# Patient Record
Sex: FEMALE | Race: WHITE | NOT HISPANIC OR LATINO | Employment: OTHER | URBAN - METROPOLITAN AREA
[De-identification: names, ages, dates, MRNs, and addresses within clinical notes are randomized per-mention and may not be internally consistent; named-entity substitution may affect disease eponyms.]

---

## 2017-01-10 ENCOUNTER — TELEPHONE (OUTPATIENT)
Dept: FAMILY MEDICINE | Facility: CLINIC | Age: 77
End: 2017-01-10
Payer: MEDICARE

## 2017-01-10 DIAGNOSIS — Z79.01 LONG TERM CURRENT USE OF ANTICOAGULANT THERAPY: Primary | ICD-10-CM

## 2017-01-10 LAB — INR POINT OF CARE: 1.9 (ref 0.86–1.14)

## 2017-01-10 PROCEDURE — 36416 COLLJ CAPILLARY BLOOD SPEC: CPT | Performed by: FAMILY MEDICINE

## 2017-01-10 PROCEDURE — 85610 PROTHROMBIN TIME: CPT | Mod: QW | Performed by: FAMILY MEDICINE

## 2017-01-10 PROCEDURE — 99207 ZZC NO CHARGE NURSE ONLY: CPT | Performed by: FAMILY MEDICINE

## 2017-01-10 NOTE — TELEPHONE ENCOUNTER
ANTICOAGULATION FOLLOW-UP CLINIC VISIT    Patient Name:  Betty Tee  Date:  1/10/2017  Contact Type:  Face to Face    SUBJECTIVE:  Bleeding Signs/Symptoms: None  Thromboembolic Signs/Symptoms: None    Medication Changes:  No  Dietary Changes:  No  Bacterial/Viral Infection:  No    Missed Coumadin Doses:  None  Other Concerns:  No      ASSESSMENT/PLAN:  See: ANTICOAGULATION QIC flow sheet.    INR 1.9  Plan: Continue 7.5 mg daily = 52.5 mg/wk. Recheck INR in 2 weeks.  Sophia Hemphill RN      Dosage adjustment made based on physician directed care plan.    JIMI VOGT

## 2017-01-12 ENCOUNTER — OFFICE VISIT (OUTPATIENT)
Dept: CARDIOLOGY | Facility: CLINIC | Age: 77
End: 2017-01-12
Attending: INTERNAL MEDICINE
Payer: MEDICARE

## 2017-01-12 ENCOUNTER — PRE VISIT (OUTPATIENT)
Dept: CARDIOLOGY | Facility: CLINIC | Age: 77
End: 2017-01-12

## 2017-01-12 VITALS
WEIGHT: 218 LBS | HEART RATE: 104 BPM | OXYGEN SATURATION: 97 % | BODY MASS INDEX: 34.21 KG/M2 | SYSTOLIC BLOOD PRESSURE: 116 MMHG | DIASTOLIC BLOOD PRESSURE: 64 MMHG | HEIGHT: 67 IN

## 2017-01-12 DIAGNOSIS — I50.32 CHRONIC DIASTOLIC CONGESTIVE HEART FAILURE (H): ICD-10-CM

## 2017-01-12 DIAGNOSIS — J81.0 ACUTE EDEMA OF LUNG (H): Primary | ICD-10-CM

## 2017-01-12 DIAGNOSIS — I48.91 ATRIAL FIBRILLATION WITH CONTROLLED VENTRICULAR RESPONSE (H): ICD-10-CM

## 2017-01-12 LAB
ANION GAP SERPL CALCULATED.3IONS-SCNC: 10 MMOL/L (ref 3–14)
BUN SERPL-MCNC: 12 MG/DL (ref 7–30)
CALCIUM SERPL-MCNC: 8.8 MG/DL (ref 8.5–10.1)
CHLORIDE SERPL-SCNC: 101 MMOL/L (ref 94–109)
CO2 SERPL-SCNC: 27 MMOL/L (ref 20–32)
CREAT SERPL-MCNC: 0.87 MG/DL (ref 0.52–1.04)
GFR SERPL CREATININE-BSD FRML MDRD: 63 ML/MIN/1.7M2
GLUCOSE SERPL-MCNC: 278 MG/DL (ref 70–99)
POTASSIUM SERPL-SCNC: 3.3 MMOL/L (ref 3.4–5.3)
SODIUM SERPL-SCNC: 139 MMOL/L (ref 133–144)

## 2017-01-12 PROCEDURE — 36415 COLL VENOUS BLD VENIPUNCTURE: CPT | Performed by: INTERNAL MEDICINE

## 2017-01-12 PROCEDURE — 99213 OFFICE O/P EST LOW 20 MIN: CPT | Mod: ZF

## 2017-01-12 PROCEDURE — 99214 OFFICE O/P EST MOD 30 MIN: CPT | Mod: ZP | Performed by: INTERNAL MEDICINE

## 2017-01-12 PROCEDURE — 80048 BASIC METABOLIC PNL TOTAL CA: CPT | Performed by: INTERNAL MEDICINE

## 2017-01-12 RX ORDER — FUROSEMIDE 40 MG
40 TABLET ORAL 2 TIMES DAILY
Qty: 180 TABLET | Refills: 3 | Status: SHIPPED | OUTPATIENT
Start: 2017-01-12 | End: 2017-04-28

## 2017-01-12 RX ORDER — SPIRONOLACTONE 25 MG/1
25 TABLET ORAL DAILY
Qty: 90 TABLET | Refills: 3 | Status: SHIPPED | OUTPATIENT
Start: 2017-01-12 | End: 2017-06-22

## 2017-01-12 RX ORDER — METOPROLOL SUCCINATE 100 MG/1
100 TABLET, EXTENDED RELEASE ORAL DAILY
Qty: 90 TABLET | Refills: 3 | Status: SHIPPED | OUTPATIENT
Start: 2017-01-12 | End: 2018-01-15

## 2017-01-12 ASSESSMENT — PAIN SCALES - GENERAL: PAINLEVEL: NO PAIN (0)

## 2017-01-12 NOTE — Clinical Note
1/12/2017      RE: Betty Tee  [address]  [facility] [address]       Dear Colleague,    Thank you for the opportunity to participate in the care of your patient, Betty Tee, at the [facility] acute [facility]. Please see a copy of my visit note below.    CC:  New HFpreservedEF referral     HPI:   75 yo F with Interstitial Lung disease (possible moderate restriction), Sjogren's syndrome, HFpEF, HTN, atrial fibrillation [id number] (age >75, HTN, CHF, gender), ANGELICA on CPAP, obesity, GI bleed, diverticulitis s/p colectomy 2011 who was referred to us by EP clinic to establish care for HFpEF. I have seen her one time previous to this.     In July she was hospitalized for lower GI bleed and Afib, resuscitated with fluids and developed CHF. Echo showed normal EF.    She started having dyspnea with activity this summer. Her symptoms improved after we increased her diuretics. Presently, she can walk 1 block or climb 1 flight before becoming SOB. She has orthopnea and no PND. Her leg swelling is improved from the last we saw her and is now minimal.  Her lightheadedness is improved from the last I saw her.     She is now on lasix dose 40mg AM and 40mg PM.     Uses 4L oxygen with exertional activities like grocery shopping, climbing stairs etc. Doesn't use it at rest or at night.       PAST MEDICAL HISTORY:  Past Medical History   Diagnosis Date     Tobacco use disorder      chantix in 9/07, started again in 6/08, working     Chest pain, unspecified      Cardiomegaly      LVH on stress echo- cardiac w/u at [facility]- neg CT scan for PE, neg stress echo in 8/06     Alcohol abuse, in remission      Osteoarthrosis, unspecified whether generalized or localized, unspecified site      Disorder of bone and cartilage, unspecified      osteopenia (had been on prempro), improved on 6/06 dexa, stable dexa 11/10     Diverticulosis of colon (without mention of hemorrhage)       last episode yrs ago     Major depressive disorder, recurrent episode, moderate (H)      Essential hypertension, benign      Insomnia, unspecified      weaned off clonazepam     Allergic rhinitis, cause unspecified      allegra helps when she takes it     Lumbago 7/09     MRI with DJD, now seeing Dr. Cain for sciatic sx's     Atrial fibrillation (H)      in hosp in 11/11 after surgery w/ fluid overload     Antiplatelet or antithrombotic long-term use      Irregular heart beat      Sjogren's syndrome (H)      Gastro-oesophageal reflux disease      Obstructive sleep apnea      Sleep apnea        FAMILY HISTORY:  Mother had MI and CHF in her 60's. Sister had MI and CHF in her 60's  Family History   Problem Relation Age of Onset     C.A.D. Mother 63     MI- first at age 63     C.A.D. Sister 52     Minor MI- age 50's     HEART DISEASE Mother      HEART DISEASE Sister      Hypertension Mother      Hypertension Sister      Hypertension Sister      Hypertension Brother      CEREBROVASCULAR DISEASE Mother      Cancer - colorectal Sister 48     Late 40's early 50's     Prostate Cancer Brother 74     Dx'd age 74     Alcohol/Drug Father      Alzheimer Disease Father      GASTROINTESTINAL DISEASE Sister      Diverticulitis     GASTROINTESTINAL DISEASE Brother      Diverticulitis     Lipids Sister      Lipids Sister      Parkinsonism Brother      DIABETES Sister      HEART DISEASE Sister      CHF     CANCER Sister      lung, smoker     Substance Abuse Sister      Substance Abuse Brother      Dementia Father      Asthma Sister      CANCER Sister      Substance Abuse Sister      Substance Abuse Brother      Asthma Sister      Hypertension Father      Breast Cancer Daughter      Prostate Cancer Brother      Hyperlipidemia Mother      Hyperlipidemia Father      Hyperlipidemia Brother        SOCIAL HISTORY:  1/2 pack/yr for 25 yrs, quit 20 yrs ago, no etoh/drug use. Retired teacher    Social History     Social History     Marital  Status: Single     Spouse Name: N/A     Number of Children: 0     Years of Education: Ed Spec De            Social History Main Topics     Smoking status: Former Smoker -- 0.50 packs/day for 10 years     Types: Cigarettes     Quit date: 08/01/2011     Smokeless tobacco: Never Used      Comment: 1/2 ppd     Alcohol Use: No      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     Drug Use: No     Sexual Activity: No     Other Topics Concern     None     Social History Narrative    Social Documentation:        Balanced Diet: YES    Calcium intake: 1-2 per day    Caffeine: 4-5 cups per day    Exercise:  type of activity limited right now due to foot injury    Sunscreen: No    Seatbelts:  Yes    Self Breast Exam:  Yes    Self Testicular Exam: n/a    Physical/Emotional/Sexual Abuse: No    Do you feel safe in your environment? No-pt lives in [facility]        Cholesterol screen up to date: No-will check today    Eye Exam up to date: Yes    Dental Exam up to date: Yes    Pap smear up to date: Does Not Apply    Mammogram up to date: Yes-10/07    Dexa Scan up to date: Yes-2006    Colonoscopy up to date: Yes-2006    Immunizations up to date: Yes-td 2002    Glucose screen if over 40:  Yes    '09       CURRENT MEDICATIONS:    Prescription Medications as of 1/14/2017             furosemide (LASIX) 40 MG tablet Take 1 tablet (40 mg) by mouth 2 times daily    metoprolol (TOPROL-XL) 100 MG 24 hr tablet Take 1 tablet (100 mg) by mouth daily    spironolactone (ALDACTONE) 25 MG tablet Take 1 tablet (25 mg) by mouth daily    pantoprazole (PROTONIX) 40 MG EC tablet Take 1 tablet (40 mg) by mouth daily    ketoconazole (NIZORAL) 2 % cream Apply topically 2 times daily    montelukast (SINGULAIR) 10 MG tablet Take 1 tablet (10 mg) by mouth At Bedtime    traZODone (DESYREL) 50 MG tablet TAKE 1/2-1 TABLET BY MOUTH NIGHTLY AS NEEDED, CAN TITRATE DOWN TO 1/2TAB OR UP TO 2TABS AS NEEDED    albuterol (PROAIR HFA, PROVENTIL HFA, VENTOLIN HFA) 108 (90 BASE)  MCG/ACT inhaler Inhale 2 puffs into the lungs every 6 hours    calcium-vitamin D (CALTRATE) 600-400 MG-UNIT per tablet Take 1 tablet by mouth 2 times daily    PARoxetine (PAXIL) 20 MG tablet TAKE 1 TABLET (20 MG) BY MOUTH AT BEDTIME    order for DME Oxygen: Patient requires supplemental Oxygen 4 LPM via nasal canula with activity. Please provide patient with a home unit and with portability capability. Oxygen will be for a lifetime.    azithromycin (ZITHROMAX) 250 MG tablet Take 1 tablet (250 mg) by mouth daily    diclofenac (VOLTAREN) 1 % GEL Apply 4 grams to knees or 2 grams to hands four times daily using enclosed dosing card.    polyethylene glycol (MIRALAX/GLYCOLAX) packet Take 17 g by mouth daily as needed for constipation    warfarin (COUMADIN) 7.5 MG tablet Current dose is 7.5 mg daily.  Dose adjusted per INR result.    acyclovir (ZOVIRAX) 5 % ointment Apply topically 6 times daily    fluticasone (FLOVENT HFA) 220 MCG/ACT inhaler Inhale 2 puffs into the lungs 2 times daily    predniSONE (DELTASONE) 10 MG tablet Take 1 tablet (10 mg) by mouth daily    acyclovir (ZOVIRAX) 400 MG tablet Take 400 mg by mouth See Admin Instructions 5 times daily as needed for outbreaks    fluticasone (FLONASE) 50 MCG/ACT nasal spray Spray 1-2 sprays into both nostrils daily    COMPRESSION STOCKINGS Wear compression stockings in affected leg (right leg) or both legs most of the time during the day and take them off at night.    acetaminophen (TYLENOL) 500 MG tablet Take 500 mg by mouth nightly as needed         ROS:   Constitutional: No fever, chills, or sweats. No weight gain/loss.   ENT: No visual disturbance, ear ache, epistaxis, sore throat.   Allergies/Immunologic: Negative.   Respiratory: No cough, hemoptysis.   Cardiovascular: As per HPI.   GI: No nausea, vomiting, hematemesis, melena, or hematochezia.   : No urinary frequency, dysuria, or hematuria.   Integument: Negative.   Psychiatric: Negative.   Neuro: Negative.  "  Endocrinology: Negative.   Musculoskeletal: Negative.    EXAM:  /64 mmHg  Pulse 104  Ht 1.695 m (5' 6.75\")  Wt 98.884 kg (218 lb)  BMI 34.42 kg/m2  SpO2 97%  General: appears comfortable, alert and articulate  Head: normocephalic, atraumatic  Eyes: anicteric sclera, EOMI  Neck: no adenopathy  Orophyarynx: moist mucosa, no lesions, dentition intact  Heart: Irregular, S1/S2, no murmur, gallop, rub, estimated JVP 10 cm  Lungs: clear, no rales or wheezing  Abdomen: soft, non-tender, bowel sounds present, no hepatosplenomegaly  Extremities: 1-2mm pitting edema from feet to proximal legs bilaterally, no clubbing, cyanosis.  Neurological: normal speech and affect, no gross motor deficits    Labs:  CBC RESULTS:  Lab Results   Component Value Date    WBC 10.1 09/20/2016    RBC 4.36 09/20/2016    HGB 10.4* 09/20/2016    HCT 35.5 09/20/2016    MCV 81 09/20/2016    MCH 23.9* 09/20/2016    MCHC 29.3* 09/20/2016    RDW 18.9* 09/20/2016     09/20/2016       CMP RESULTS:  Lab Results   Component Value Date     12/14/2016    POTASSIUM 3.7 12/14/2016    CHLORIDE 101 12/14/2016    CO2 31 12/14/2016    ANIONGAP 7 12/14/2016    * 12/14/2016    BUN 14 12/14/2016    CR 0.91 12/14/2016    GFRESTIMATED 60* 12/14/2016    GFRESTBLACK 72 12/14/2016    CLAUDY 8.9 12/14/2016    BILITOTAL 0.7 11/03/2016    ALBUMIN 3.4 11/03/2016    ALKPHOS 70 11/03/2016    ALT 21 11/03/2016    AST 12 11/03/2016        INR RESULTS:  Lab Results   Component Value Date    INR 1.9* 01/10/2017    INR 2.35* 08/01/2016    INR 3.0* 04/28/2015       Lab Results   Component Value Date    MAG 2.1 08/01/2016     Lab Results   Component Value Date    NTBNPI 2216* 07/02/2016     Lab Results   Component Value Date    NTBNP 755* 11/03/2016     HgA1c 7% Sept 2016    ECG 10/26/16 atrial fibrillation HR 72    48hr holter July 2016- generally controlled atrial fibrillation    July 2016 Complete Portable Echo Adult. Contrast Optison. Optison (NDC " #[id number])  given intravenously. Patient was given 4 ml mixture of 3 ml Optison and 6 ml  saline. 5 ml wasted. Interpretation Summary  Atrial fibrillation  Left ventricular function, chamber size, wall motion, and wall thickness are  normal.The EF is 60-65%. Normal LV size and wall thickness, mild bilateral atrial enlargement  PA pressure cannot be determined  Estimated RA pressure 8 mmHg    Regadenosen MPI 2014: no fixed or inducible defects      Assessment and Plan:  In summary this is a very pleasant 76 year old female with suspected HFpEF who is referred today for further evaluation and treatment.     In support of the diagnosis of HFpEF includes mild LA enlargement, echocardiographic signs of increase LV filling pressures, increased nt-bnp, as well as a diuretic requirement and symptomatic improvement on diuretics. She also developed flash pulmonary edema after receiving fluid in the hospital in the setting of a GI bleed.     The risk factors for HFpEF in her include age, gender, HTN, pre-diabetes, sleep apnea and obesity.  A prior stress test was negative for CAD. She is presently euvolemic on exam and is NYHA class III.    The mainstays of therapy for HFpEF include volume management, blood pressure control, treating underlying sleep apnea if present, weight management, exercise training, rate control for atrial fibrillation as well as consideration for a rhythm control strategy, as well as consideration for clinical trials.      I do have her on spironolactone given the results of the TOPCAT trial. Patients have symptomatically felt very improved on this medication.     This patient is not a candidate for UYS641 (PARAGON) as this clinical trial is now closed to enrollment.         Summary of today's plan:   -  Continue lasix at 40 mg Q AM/40 Q PM -  - increasing metoprolol as a fib is fast on exam today   -  ischemia evaluation is  complete   - increasing spironolactone to 25 mg daily given low K and we  will recheck next week       Other:    Af fib:- permanent  poorly controlled rate on exam increasing her metoprolol to 100 mg XL daily - INR goal 2-3  [id number] score of 5 warrants anticoagulation       We look forward to seeing Betty Tee in back in 5 months for follow up up.      Please feel free to contact me with any further questions and thank you so much for this interesing referral.     Sincerely,     Radha Rosa  [facility]      Director,  [facility] HFpEF Program       CC  Patient Care Team:  Ilene Tristan MD as PCP - General  [facility] [place], Becky Laureano MD as Resident  Landon, Jose A Saul MD as MD (Rheumatology)  William Azevedo MD as MD (Internal Medicine)  Kirill Polk MD as MD (Otolaryngology)  Aubrey Hurtado MD as MD (Cardiology)  Jasvir Franco MD as Resident (Internal Medicine)  Danay Payne RN as Nurse Coordinator (Clinical Cardiac Electrophysiology)  Juana Nunn RN as Registered Nurse (Cardiology)  Anderson Perez MD as MD (Clinical Cardiac Electrophysiology)  Martha Gardiner, RN as Nurse Coordinator (Cardiology)  NORRIS MEEHAN

## 2017-01-12 NOTE — MR AVS SNAPSHOT
After Visit Summary   1/12/2017    Betty Tee    MRN: [id number]           Patient Information     Date Of Birth          1940        Visit Information        Provider Department      1/12/2017 1:30 PM Radha Rosa MD [facility]        Today's Diagnoses     Acute edema of lung (H)    -  1     Atrial fibrillation with controlled ventricular response (H)         Chronic diastolic congestive heart failure (H)           Care Instructions    You were seen today in the Cardiovascular Clinic at the [facility].     Cardiology Providers you saw during your visit:  Dr. Rosa    Recommendations:  Increase Spironolactone to 25 mg (1 pill) once daily  Increase Toprol to 100 mg once daily, we've sent a new prescription for 100 mg tablets  We've sent a new prescription for your lasix of 40 mg tablets-this will be the same dose you currently take but 1 pill twice daily  Have labs repeated next week  Eat a heart healthy, low sodium diet.  Get 20 to 30 minutes of aerobic exercise 4 to 5 times per week as tolerated. (Examples of aerobic exercise include: walking, bicycling, swimming, running).    Follow-up:  With Dr. Rosa in 5 months with labs    Results:      Orders Only on 01/12/2017   Component Date Value Ref Range Status     Sodium 01/12/2017 139  133 - 144 mmol/L Final     Potassium 01/12/2017 3.3* 3.4 - 5.3 mmol/L Final     Chloride 01/12/2017 101  94 - 109 mmol/L Final     Carbon Dioxide 01/12/2017 27  20 - 32 mmol/L Final     Anion Gap 01/12/2017 10  3 - 14 mmol/L Final     Glucose 01/12/2017 278* 70 - 99 mg/dL Final     Urea Nitrogen 01/12/2017 12  7 - 30 mg/dL Final     Creatinine 01/12/2017 0.87  0.52 - 1.04 mg/dL Final     GFR Estimate 01/12/2017 63  >60 mL/min/1.7m2 Final    Non  GFR Calc     GFR Estimate If Black 01/12/2017 76  >60 mL/min/1.7m2 Final    African American GFR Calc     Calcium 01/12/2017 8.8  8.5 - 10.1 mg/dL Final       For  emergencies call 911.    For any scheduling needs, please call [phone], option #1 then option # 1.    Thank you for entrusting us with your care!     Please call if you have any questions or concerns.    Ashlyn Lucas RN  Cardiology Care Coordinator  [phone], press option # 1 to be routed to the [place] then option # 3 for medical questions to speak with a nurse    If you have an urgent need after business hours or over the weekend please call [phone] and ask for the cardiology fellow on call.     If you have a hard time paying for your medications visit http://www.needymeds.org/ to see where you might be able to get your medications the cheapest along with patient assistance programs available.    Heart failure patients and family members are welcome you to come to one of our heart failure support group meetings on the following dates from 1-2 pm :12/5/16. These all take place on the 8th floor of the [facility] hospital building in the [facility] Conference Room. Let us know if you have any questions.          Follow-ups after your visit        Your next 10 appointments already scheduled     Jan 24, 2017  1:30 PM   LAB with UP LAB   [facility] ([facility])    [address] [id number] [phone]           Patient must bring picture ID.  Patient should be prepared to give a urine specimen  Please do not eat 10-12 hours before your appointment if you are coming in fasting for labs on lipids, cholesterol, or glucose (sugar).  Pregnant women should follow their Care Team instructions. Water with medications is okay. Do not drink coffee or other fluids.   If you have concerns about taking  your medications, please ask at office or if scheduling via [employer], send a message by clicking on Secure Messaging, Message Your Care Team.            Feb 15, 2017 10:00 AM   Office Visit with Ilene Tristan MD   [facility])    [address]  [facility] [address] [phone]           Bring a current list of meds and any records pertaining to this visit.  For Physicals, please bring immunization records and any forms needing to be filled out.  Please arrive 10 minutes early to complete paperwork.            Mar 22, 2017 11:40 AM   (Arrive by 11:25 AM)   CT CHEST W/O CONTRAST with UCCT2   [facility] ([facility])    [address] [place] [id number] [phone]           Please bring any scans or X-rays taken at other hospitals, if similar tests were done. Also bring a list of your medicines, including vitamins, minerals and over-the-counter drugs. It is safest to leave personal items at home.  Be sure to tell your doctor:   If you have any allergies.   If there s any chance you are pregnant.   If you are breastfeeding.   If you have any special needs.  You do not need to do anything special to prepare.  Please wear loose clothing, such as a sweat suit or jogging clothes. Avoid snaps, zippers and other metal. We may ask you to undress and put on a hospital gown.            Mar 22, 2017 12:00 PM   FULL PULMONARY FUNCTION with  PFL A   [facility] ([facility])    [address] [place] [id number] [phone]            Mar 22, 2017  1:00 PM   (Arrive by 12:45 PM)   Return Interstitial Lung with Meño Walsh MD   [facility] ([facility])    [address] [place] [id number]   [phone]            Jun 22, 2017  4:30 PM   Lab with  LAB   [facility] ([facility])    [address] [place] [address] [id number] [phone]            Jun 22, 2017  5:00 PM   (Arrive by 4:45 PM)   RETURN HEART FAILURE with Radha Rosa MD     "[facility] ([facility])    [address]  3rd Floor  [facility] [id number] [phone]              Future tests that were ordered for you today     Open Future Orders        [facility] Expected Expires Ordered    Basic metabolic panel STAT 1/14/2017 1/19/2017 1/12/2017            Who to contact     If you have questions or need follow up information about today's clinic visit or your schedule please contact [facility] directly at [phone].  Normal or non-critical lab and imaging results will be communicated to you by [employer], letter or phone within 4 business days after the clinic has received the results. If you do not hear from us within 7 days, please contact the clinic through [employer] or phone. If you have a critical or abnormal lab result, we will notify you by phone as soon as possible.  Submit refill requests through [employer] or call your pharmacy and they will forward the refill request to us. Please allow 3 business days for your refill to be completed.          Additional Information About Your Visit        [employer] Information     [employer] gives you secure access to your electronic health record. If you see a primary care provider, you can also send messages to your care team and make appointments. If you have questions, please call your primary care clinic.  If you do not have a primary care provider, please call [phone] and they will assist you.        Care EveryWhere ID     This is your Care EveryWhere ID. This could be used by other organizations to access your [facility] medical records  [id number]        Your Vitals Were     Pulse Height BMI (Body Mass Index) Pulse Oximetry          104 1.695 m (5' 6.75\") 34.42 kg/m2 97%         Blood Pressure from Last 3 Encounters:   01/12/17 116/64   12/30/16 133/80   12/23/16 115/63    Weight from Last 3 Encounters:   01/12/17 98.884 kg (218 lb)   12/30/16 98.884 kg (218 lb)   12/23/16 100.699 kg (222 " lb)                 Today's Medication Changes          These changes are accurate as of: 1/12/17  2:30 PM.  If you have any questions, ask your nurse or doctor.               These medicines have changed or have updated prescriptions.        Dose/Directions    acyclovir 5 % ointment   Commonly known as:  ZOVIRAX   This may have changed:  additional instructions   Used for:  Recurrent cold sores        Apply topically 6 times daily   Quantity:  15 g   Refills:  3       fluticasone 50 MCG/ACT spray   Commonly known as:  FLONASE   This may have changed:    - when to take this  - reasons to take this   Used for:  Seasonal allergic rhinitis        Dose:  1-2 spray   Spray 1-2 sprays into both nostrils daily   Quantity:  16 g   Refills:  5       furosemide 40 MG tablet   Commonly known as:  LASIX   This may have changed:    - medication strength  - how much to take  - how to take this  - when to take this  - additional instructions   Used for:  Acute edema of lung (H)   Changed by:  Radha Rosa MD        Dose:  40 mg   Take 1 tablet (40 mg) by mouth 2 times daily   Quantity:  180 tablet   Refills:  3       metoprolol 100 MG 24 hr tablet   Commonly known as:  TOPROL-XL   This may have changed:    - medication strength  - how much to take  - how to take this  - when to take this  - additional instructions   Used for:  Atrial fibrillation with controlled ventricular response (H)   Changed by:  Radha Rosa MD        Dose:  100 mg   Take 1 tablet (100 mg) by mouth daily   Quantity:  90 tablet   Refills:  3       spironolactone 25 MG tablet   Commonly known as:  ALDACTONE   This may have changed:  how much to take   Used for:  Chronic diastolic congestive heart failure (H)   Changed by:  Radha Rosa MD        Dose:  25 mg   Take 1 tablet (25 mg) by mouth daily   Quantity:  90 tablet   Refills:  3            Where to get your medicines      These medications were sent to [facility]/pharmacy [facility][id number] -  [place] - [address] [place] [address]     Phone:  [phone]    - furosemide 40 MG tablet  - metoprolol 100 MG 24 hr tablet  - spironolactone 25 MG tablet             Primary Care Provider Office Phone # Fax #    Ilene Tristan [phone]       [facility] [address]  [facility] [address]        Thank you!     Thank you for choosing [facility]  for your care. Our goal is always to provide you with excellent care. Hearing back from our patients is one way we can continue to improve our services. Please take a few minutes to complete the written survey that you may receive in the mail after your visit with us. Thank you!             Your Updated Medication List - Protect others around you: Learn how to safely use, store and throw away your medicines at www.disposemymeds.org.          This list is accurate as of: 1/12/17  2:30 PM.  Always use your most recent med list.                   Brand Name Dispense Instructions for use    acyclovir 400 MG tablet    ZOVIRAX     Take 400 mg by mouth See Admin Instructions 5 times daily as needed for outbreaks       acyclovir 5 % ointment    ZOVIRAX    15 g    Apply topically 6 times daily       albuterol 108 (90 BASE) MCG/ACT Inhaler    PROAIR HFA/PROVENTIL HFA/VENTOLIN HFA    1 Inhaler    Inhale 2 puffs into the lungs every 6 hours       azithromycin 250 MG tablet    ZITHROMAX    30 tablet    Take 1 tablet (250 mg) by mouth daily       calcium-vitamin D 600-400 MG-UNIT per tablet    CALTRATE    60 tablet    Take 1 tablet by mouth 2 times daily       COMPRESSION STOCKINGS     2 each    Wear compression stockings in affected leg (right leg) or both legs most of the time during the day and take them off at night.       diclofenac 1 % Gel topical gel    VOLTAREN    100 g    Apply 4 grams to knees or 2 grams to hands four times daily using enclosed dosing card.        fluticasone 220 MCG/ACT Inhaler    FLOVENT HFA    3 Inhaler    Inhale 2 puffs into the lungs 2 times daily       fluticasone 50 MCG/ACT spray    FLONASE    16 g    Spray 1-2 sprays into both nostrils daily       furosemide 40 MG tablet    LASIX    180 tablet    Take 1 tablet (40 mg) by mouth 2 times daily       ketoconazole 2 % cream    NIZORAL    60 g    Apply topically 2 times daily       metoprolol 100 MG 24 hr tablet    TOPROL-XL    90 tablet    Take 1 tablet (100 mg) by mouth daily       montelukast 10 MG tablet    SINGULAIR    90 tablet    Take 1 tablet (10 mg) by mouth At Bedtime       order for DME     1 Device    Oxygen: Patient requires supplemental Oxygen 4 LPM via nasal canula with activity. Please provide patient with a home unit and with portability capability. Oxygen will be for a lifetime.       pantoprazole 40 MG EC tablet    PROTONIX    90 tablet    Take 1 tablet (40 mg) by mouth daily       PARoxetine 20 MG tablet    PAXIL    90 tablet    TAKE 1 TABLET (20 MG) BY MOUTH AT BEDTIME       polyethylene glycol Packet    MIRALAX/GLYCOLAX    7 packet    Take 17 g by mouth daily as needed for constipation       predniSONE 10 MG tablet    DELTASONE    90 tablet    Take 1 tablet (10 mg) by mouth daily       spironolactone 25 MG tablet    ALDACTONE    90 tablet    Take 1 tablet (25 mg) by mouth daily       traZODone 50 MG tablet    DESYREL    60 tablet    TAKE 1/2-1 TABLET BY MOUTH NIGHTLY AS NEEDED, CAN TITRATE DOWN TO 1/2TAB OR UP TO 2TABS AS NEEDED       TYLENOL 500 MG tablet   Generic drug:  acetaminophen      Take 500 mg by mouth nightly as needed       warfarin 7.5 MG tablet    COUMADIN    100 tablet    Current dose is 7.5 mg daily.  Dose adjusted per INR result.

## 2017-01-12 NOTE — NURSING NOTE
Labs: Patient was given results of the laboratory testing obtained today. Patient was instructed to return for the next laboratory testing in 1 week. Patient demonstrated understanding of this information and agreed to call with further questions or concerns.   Return Appointment: Patient given instructions regarding scheduling next clinic visit. Patient demonstrated understanding of this information and agreed to call with further questions or concerns.  Medication Change: Patient was educated regarding prescribed medication change, including discussion of the indication, administration, side effects, and when to report to MD or RN. Patient demonstrated understanding of this information and agreed to call with further questions or concerns.  Patient stated she understood all health information given and agreed to call with further questions or concerns.

## 2017-01-12 NOTE — PATIENT INSTRUCTIONS
You were seen today in the Cardiovascular Clinic at the [facility].     Cardiology Providers you saw during your visit:  Dr. Rosa    Recommendations:  Increase Spironolactone to 25 mg (1 pill) once daily  Increase Toprol to 100 mg once daily, we've sent a new prescription for 100 mg tablets  We've sent a new prescription for your lasix of 40 mg tablets-this will be the same dose you currently take but 1 pill twice daily  Have labs repeated next week  Eat a heart healthy, low sodium diet.  Get 20 to 30 minutes of aerobic exercise 4 to 5 times per week as tolerated. (Examples of aerobic exercise include: walking, bicycling, swimming, running).    Follow-up:  With Dr. Rosa in 5 months with labs    Results:      Orders Only on 01/12/2017   Component Date Value Ref Range Status     Sodium 01/12/2017 139  133 - 144 mmol/L Final     Potassium 01/12/2017 3.3* 3.4 - 5.3 mmol/L Final     Chloride 01/12/2017 101  94 - 109 mmol/L Final     Carbon Dioxide 01/12/2017 27  20 - 32 mmol/L Final     Anion Gap 01/12/2017 10  3 - 14 mmol/L Final     Glucose 01/12/2017 278* 70 - 99 mg/dL Final     Urea Nitrogen 01/12/2017 12  7 - 30 mg/dL Final     Creatinine 01/12/2017 0.87  0.52 - 1.04 mg/dL Final     GFR Estimate 01/12/2017 63  >60 mL/min/1.7m2 Final    Non  GFR Calc     GFR Estimate If Black 01/12/2017 76  >60 mL/min/1.7m2 Final    African American GFR Calc     Calcium 01/12/2017 8.8  8.5 - 10.1 mg/dL Final       For emergencies call 911.    For any scheduling needs, please call [phone], option #1 then option # 1.    Thank you for entrusting us with your care!     Please call if you have any questions or concerns.    Ashlyn Lucas RN  Cardiology Care Coordinator  [phone], press option # 1 to be routed to the [place] then option # 3 for medical questions to speak with a nurse    If you have an urgent need after business hours or over the weekend please call [phone] and ask  for the cardiology fellow on call.     If you have a hard time paying for your medications visit http://www.needymeds.org/ to see where you might be able to get your medications the cheapest along with patient assistance programs available.    Heart failure patients and family members are welcome you to come to one of our heart failure support group meetings on the following dates from 1-2 pm :12/5/16. These all take place on the 8th floor of the [facility] hospital building in the [facility] Conference Room. Let us know if you have any questions.

## 2017-01-12 NOTE — NURSING NOTE
Chief Complaint   Patient presents with     Follow Up For     76 yr old female with h/o chronic diastolic HF presenting for follow up with labs     Pt states she is having difficulties cutting Spironolactone tablets in half. Please advise

## 2017-01-12 NOTE — PROGRESS NOTES
CC[facility]  [facility]     HPI:   75 yo F with Interstitial Lung disease (possible moderate restriction), Sjogren's syndrome, HFpEF, HTN, atrial fibrillation [id number] (age >75, HTN, CHF, gender), ANGELICA on CPAP, obesity, GI bleed, diverticulitis s/p colectomy 2011 who was referred to us by EP clinic to establish care for HFpEF. I have seen her one time previous to this.     In July she was hospitalized for lower GI bleed and Afib, resuscitated with fluids and developed CHF. Echo showed normal EF.    She started having dyspnea with activity this summer. Her symptoms improved after we increased her diuretics. Presently, she can walk 1 block or climb 1 flight before becoming SOB. She has orthopnea and no PND. Her leg swelling is improved from the last we saw her and is now minimal.  Her lightheadedness is improved from the last I saw her.     She is now on lasix dose 40mg AM and 40mg PM.     Uses 4L oxygen with exertional activities like grocery shopping, climbing stairs etc. Doesn't use it at rest or at night.       PAST MEDICAL HISTORY:  Past Medical History   Diagnosis Date     Tobacco use disorder      chantix in 9/07, started again in 6/08, working     Chest pain, unspecified      Cardiomegaly      LVH on stress echo- cardiac w/u at [facility]- neg CT scan for PE, neg stress echo in 8/06     Alcohol abuse, in remission      Osteoarthrosis, unspecified whether generalized or localized, unspecified site      Disorder of bone and cartilage, unspecified      osteopenia (had been on prempro), improved on 6/06 dexa, stable dexa 11/10     Diverticulosis of colon (without mention of hemorrhage)      last episode yrs ago     Major depressive disorder, recurrent episode, moderate (H)      Essential hypertension, benign      Insomnia, unspecified      weaned off clonazepam     Allergic rhinitis, cause unspecified      allegra helps when she takes it     Lumbago 7/09     MRI with NEAL, now seeing Dr. Cain for sciatic  sx's     Atrial fibrillation (H)      in hosp in 11/11 after surgery w/ fluid overload     Antiplatelet or antithrombotic long-term use      Irregular heart beat      Sjogren's syndrome (H)      Gastro-oesophageal reflux disease      Obstructive sleep apnea      Sleep apnea        FAMILY HISTORY:  Mother had MI and CHF in her 60's. Sister had MI and CHF in her 60's  Family History   Problem Relation Age of Onset     C.A.D. Mother 63     MI- first at age 63     C.A.D. Sister 52     Minor MI- age 50's     HEART DISEASE Mother      HEART DISEASE Sister      Hypertension Mother      Hypertension Sister      Hypertension Sister      Hypertension Brother      CEREBROVASCULAR DISEASE Mother      Cancer - colorectal Sister 48     Late 40's early 50's     Prostate Cancer Brother 74     Dx'd age 74     Alcohol/Drug Father      Alzheimer Disease Father      GASTROINTESTINAL DISEASE Sister      Diverticulitis     GASTROINTESTINAL DISEASE Brother      Diverticulitis     Lipids Sister      Lipids Sister      Parkinsonism Brother      DIABETES Sister      HEART DISEASE Sister      CHF     CANCER Sister      lung, smoker     Substance Abuse Sister      Substance Abuse Brother      Dementia Father      Asthma Sister      CANCER Sister      Substance Abuse Sister      Substance Abuse Brother      Asthma Sister      Hypertension Father      Breast Cancer Daughter      Prostate Cancer Brother      Hyperlipidemia Mother      Hyperlipidemia Father      Hyperlipidemia Brother        SOCIAL HISTORY:  1/2 pack/yr for 25 yrs, quit 20 yrs ago, no etoh/drug use. Retired teacher    Social History     Social History     Marital Status: Single     Spouse Name: N/A     Number of Children: 0     Years of Education: Ed Spec De            Social History Main Topics     Smoking status: Former Smoker -- 0.50 packs/day for 10 years     Types: Cigarettes     Quit date: 08/01/2011     Smokeless tobacco: Never Used      Comment: 1/2 ppd     Alcohol Use: No       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     Drug Use: No     Sexual Activity: No     Other Topics Concern     None     Social History Narrative    Social Documentation:        Balanced Diet: YES    Calcium intake: 1-2 per day    Caffeine: 4-5 cups per day    Exercise:  type of activity limited right now due to foot injury    Sunscreen: No    Seatbelts:  Yes    Self Breast Exam:  Yes    Self Testicular Exam: n/a    Physical/Emotional/Sexual Abuse: No    Do you feel safe in your environment? No-pt lives in [facility]        Cholesterol screen up to date: No-will check today    Eye Exam up to date: Yes    Dental Exam up to date: Yes    Pap smear up to date: Does Not Apply    Mammogram up to date: Yes-10/07    Dexa Scan up to date: Yes-2006    Colonoscopy up to date: Yes-2006    Immunizations up to date: Yes-td 2002    Glucose screen if over 40:  Yes    '09       CURRENT MEDICATIONS:    Prescription Medications as of 1/14/2017             furosemide (LASIX) 40 MG tablet Take 1 tablet (40 mg) by mouth 2 times daily    metoprolol (TOPROL-XL) 100 MG 24 hr tablet Take 1 tablet (100 mg) by mouth daily    spironolactone (ALDACTONE) 25 MG tablet Take 1 tablet (25 mg) by mouth daily    pantoprazole (PROTONIX) 40 MG EC tablet Take 1 tablet (40 mg) by mouth daily    ketoconazole (NIZORAL) 2 % cream Apply topically 2 times daily    montelukast (SINGULAIR) 10 MG tablet Take 1 tablet (10 mg) by mouth At Bedtime    traZODone (DESYREL) 50 MG tablet TAKE 1/2-1 TABLET BY MOUTH NIGHTLY AS NEEDED, CAN TITRATE DOWN TO 1/2TAB OR UP TO 2TABS AS NEEDED    albuterol (PROAIR HFA, PROVENTIL HFA, VENTOLIN HFA) 108 (90 BASE) MCG/ACT inhaler Inhale 2 puffs into the lungs every 6 hours    calcium-vitamin D (CALTRATE) 600-400 MG-UNIT per tablet Take 1 tablet by mouth 2 times daily    PARoxetine (PAXIL) 20 MG tablet TAKE 1 TABLET (20 MG) BY MOUTH AT BEDTIME    order for DME Oxygen: Patient requires supplemental Oxygen 4 LPM via nasal canula with  "activity. Please provide patient with a home unit and with portability capability. Oxygen will be for a lifetime.    azithromycin (ZITHROMAX) 250 MG tablet Take 1 tablet (250 mg) by mouth daily    diclofenac (VOLTAREN) 1 % GEL Apply 4 grams to knees or 2 grams to hands four times daily using enclosed dosing card.    polyethylene glycol (MIRALAX/GLYCOLAX) packet Take 17 g by mouth daily as needed for constipation    warfarin (COUMADIN) 7.5 MG tablet Current dose is 7.5 mg daily.  Dose adjusted per INR result.    acyclovir (ZOVIRAX) 5 % ointment Apply topically 6 times daily    fluticasone (FLOVENT HFA) 220 MCG/ACT inhaler Inhale 2 puffs into the lungs 2 times daily    predniSONE (DELTASONE) 10 MG tablet Take 1 tablet (10 mg) by mouth daily    acyclovir (ZOVIRAX) 400 MG tablet Take 400 mg by mouth See Admin Instructions 5 times daily as needed for outbreaks    fluticasone (FLONASE) 50 MCG/ACT nasal spray Spray 1-2 sprays into both nostrils daily    COMPRESSION STOCKINGS Wear compression stockings in affected leg (right leg) or both legs most of the time during the day and take them off at night.    acetaminophen (TYLENOL) 500 MG tablet Take 500 mg by mouth nightly as needed         ROS:   Constitutional: No fever, chills, or sweats. No weight gain/loss.   ENT: No visual disturbance, ear ache, epistaxis, sore throat.   Allergies/Immunologic: Negative.   Respiratory: No cough, hemoptysis.   Cardiovascular: As per HPI.   GI: No nausea, vomiting, hematemesis, melena, or hematochezia.   : No urinary frequency, dysuria, or hematuria.   Integument: Negative.   Psychiatric: Negative.   Neuro: Negative.   Endocrinology: Negative.   Musculoskeletal: Negative.    EXAM:  /64 mmHg  Pulse 104  Ht 1.695 m (5' 6.75\")  Wt 98.884 kg (218 lb)  BMI 34.42 kg/m2  SpO2 97%  General: appears comfortable, alert and articulate  Head: normocephalic, atraumatic  Eyes: anicteric sclera, EOMI  Neck: no adenopathy  Orophyarynx: moist " mucosa, no lesions, dentition intact  Heart: Irregular, S1/S2, no murmur, gallop, rub, estimated JVP 10 cm  Lungs: clear, no rales or wheezing  Abdomen: soft, non-tender, bowel sounds present, no hepatosplenomegaly  Extremities: 1-2mm pitting edema from feet to proximal legs bilaterally, no clubbing, cyanosis.  Neurological: normal speech and affect, no gross motor deficits    Labs:  CBC RESULTS:  Lab Results   Component Value Date    WBC 10.1 09/20/2016    RBC 4.36 09/20/2016    HGB 10.4* 09/20/2016    HCT 35.5 09/20/2016    MCV 81 09/20/2016    MCH 23.9* 09/20/2016    MCHC 29.3* 09/20/2016    RDW 18.9* 09/20/2016     09/20/2016       CMP RESULTS:  Lab Results   Component Value Date     12/14/2016    POTASSIUM 3.7 12/14/2016    CHLORIDE 101 12/14/2016    CO2 31 12/14/2016    ANIONGAP 7 12/14/2016    * 12/14/2016    BUN 14 12/14/2016    CR 0.91 12/14/2016    GFRESTIMATED 60* 12/14/2016    GFRESTBLACK 72 12/14/2016    CLAUDY 8.9 12/14/2016    BILITOTAL 0.7 11/03/2016    ALBUMIN 3.4 11/03/2016    ALKPHOS 70 11/03/2016    ALT 21 11/03/2016    AST 12 11/03/2016        INR RESULTS:  Lab Results   Component Value Date    INR 1.9* 01/10/2017    INR 2.35* 08/01/2016    INR 3.0* 04/28/2015       Lab Results   Component Value Date    MAG 2.1 08/01/2016     Lab Results   Component Value Date    NTBNPI 2216* 07/02/2016     Lab Results   Component Value Date    NTBNP 755* 11/03/2016     HgA1c 7% Sept 2016    ECG 10/26/16 atrial fibrillation HR 72    48hr holter July 2016- generally controlled atrial fibrillation    July 2016 Complete Portable Echo Adult. Contrast Optison. Optison (NDC #9402-2262-01)  given intravenously. Patient was given 4 ml mixture of 3 ml Optison and 6 ml  saline. 5 ml wasted. Interpretation Summary  Atrial fibrillation  Left ventricular function, chamber size, wall motion, and wall thickness are  normal.The EF is 60-65%. Normal LV size and wall thickness, mild bilateral atrial  enlargement  PA pressure cannot be determined  Estimated RA pressure 8 mmHg    Regadenosen MPI 2014: no fixed or inducible defects      Assessment and Plan:  In summary this is a very pleasant 76 year old female with suspected HFpEF who is referred today for further evaluation and treatment.     In support of the diagnosis of HFpEF includes mild LA enlargement, echocardiographic signs of increase LV filling pressures, increased nt-bnp, as well as a diuretic requirement and symptomatic improvement on diuretics. She also developed flash pulmonary edema after receiving fluid in the hospital in the setting of a GI bleed.     The risk factors for HFpEF in her include age, gender, HTN, pre-diabetes, sleep apnea and obesity.  A prior stress test was negative for CAD. She is presently euvolemic on exam and is NYHA class III.    The mainstays of therapy for HFpEF include volume management, blood pressure control, treating underlying sleep apnea if present, weight management, exercise training, rate control for atrial fibrillation as well as consideration for a rhythm control strategy, as well as consideration for clinical trials.      I do have her on spironolactone given the results of the TOPCAT trial. Patients have symptomatically felt very improved on this medication.     This patient is not a candidate for CBN990 (PARAGON) as this clinical trial is now closed to enrollment.         Summary of today's plan:   -  Continue lasix at 40 mg Q AM/40 Q PM -  - increasing metoprolol as a fib is fast on exam today   -  ischemia evaluation is  complete   - increasing spironolactone to 25 mg daily given low K and we will recheck next week       Other:    Af fib:- permanent  poorly controlled rate on exam increasing her metoprolol to 100 mg XL daily - INR goal 2-3  [id number] score of 5 warrants anticoagulation       We look forward to seeing Betty Tee in back in 5 months for follow up up.      Please feel free to contact me  with any further questions and thank you so much for this interesing referral.     Sincerely,     Radha Rosa  [facility]      Director,  [facility] HFpEF Program       CC  Patient Care Team:  Ilene Tristan MD as PCP - General  [facility] [place], Becky Laureano MD as Resident  Jose A Navarrete MD as MD (Rheumatology)  William Azevedo MD as MD (Internal Medicine)  Kirill Polk MD as MD (Otolaryngology)  Aubrey Hurtado MD as MD (Cardiology)  Jasvir Franco MD as Resident (Internal Medicine)  Danay Payne, RN as Nurse Coordinator (Clinical Cardiac Electrophysiology)  Juana Nunn RN as Registered Nurse (Cardiology)  Anderson Perez MD as MD (Clinical Cardiac Electrophysiology)  Martha Gardiner, RN as Nurse Coordinator (Cardiology)  Radha Rosa MD as MD (Cardiology)  NORRIS MEEHAN

## 2017-01-12 NOTE — PROGRESS NOTES
Quick Note:    Results discussed directly with patient while patient was present. Any further details documented in the note.     Beatriz Lucas RN  ______

## 2017-01-24 ENCOUNTER — TELEPHONE (OUTPATIENT)
Dept: FAMILY MEDICINE | Facility: CLINIC | Age: 77
End: 2017-01-24
Payer: MEDICARE

## 2017-01-24 DIAGNOSIS — Z79.01 LONG TERM CURRENT USE OF ANTICOAGULANT THERAPY: Primary | ICD-10-CM

## 2017-01-24 DIAGNOSIS — K92.2 LOWER GI BLEED: ICD-10-CM

## 2017-01-24 DIAGNOSIS — E11.9 TYPE 2 DIABETES MELLITUS WITHOUT COMPLICATION, WITHOUT LONG-TERM CURRENT USE OF INSULIN (H): ICD-10-CM

## 2017-01-24 DIAGNOSIS — I50.32 CHRONIC DIASTOLIC CONGESTIVE HEART FAILURE (H): ICD-10-CM

## 2017-01-24 LAB
ANION GAP SERPL CALCULATED.3IONS-SCNC: 10 MMOL/L (ref 3–14)
BUN SERPL-MCNC: 15 MG/DL (ref 7–30)
CALCIUM SERPL-MCNC: 8.7 MG/DL (ref 8.5–10.1)
CHLORIDE SERPL-SCNC: 99 MMOL/L (ref 94–109)
CO2 SERPL-SCNC: 29 MMOL/L (ref 20–32)
CREAT SERPL-MCNC: 0.81 MG/DL (ref 0.52–1.04)
ERYTHROCYTE [DISTWIDTH] IN BLOOD BY AUTOMATED COUNT: 18 % (ref 10–15)
GFR SERPL CREATININE-BSD FRML MDRD: 68 ML/MIN/1.7M2
GLUCOSE SERPL-MCNC: 351 MG/DL (ref 70–99)
HBA1C MFR BLD: 9.4 % (ref 4.3–6)
HCT VFR BLD AUTO: 33.5 % (ref 35–47)
HGB BLD-MCNC: 9.8 G/DL (ref 11.7–15.7)
INR POINT OF CARE: 2.7 (ref 0.86–1.14)
MCH RBC QN AUTO: 23 PG (ref 26.5–33)
MCHC RBC AUTO-ENTMCNC: 29.3 G/DL (ref 31.5–36.5)
MCV RBC AUTO: 79 FL (ref 78–100)
PLATELET # BLD AUTO: 306 10E9/L (ref 150–450)
POTASSIUM SERPL-SCNC: 3.5 MMOL/L (ref 3.4–5.3)
RBC # BLD AUTO: 4.26 10E12/L (ref 3.8–5.2)
SODIUM SERPL-SCNC: 138 MMOL/L (ref 133–144)
WBC # BLD AUTO: 12.4 10E9/L (ref 4–11)

## 2017-01-24 PROCEDURE — 99207 ZZC NO CHARGE NURSE ONLY: CPT | Performed by: FAMILY MEDICINE

## 2017-01-24 PROCEDURE — 85610 PROTHROMBIN TIME: CPT | Mod: QW | Performed by: FAMILY MEDICINE

## 2017-01-24 PROCEDURE — 36416 COLLJ CAPILLARY BLOOD SPEC: CPT | Performed by: FAMILY MEDICINE

## 2017-01-24 PROCEDURE — 85027 COMPLETE CBC AUTOMATED: CPT | Performed by: FAMILY MEDICINE

## 2017-01-24 PROCEDURE — 83036 HEMOGLOBIN GLYCOSYLATED A1C: CPT | Performed by: FAMILY MEDICINE

## 2017-01-24 PROCEDURE — 80048 BASIC METABOLIC PNL TOTAL CA: CPT | Performed by: INTERNAL MEDICINE

## 2017-01-24 NOTE — TELEPHONE ENCOUNTER
ANTICOAGULATION FOLLOW-UP CLINIC VISIT    Patient Name:  Betty Tee  Date:  1/24/2017  Contact Type:  Face to Face. Dose instructions given to patient while still in lab.    SUBJECTIVE:  Bleeding Signs/Symptoms: None  Thromboembolic Signs/Symptoms: None    Medication Changes:  No  Dietary Changes:  No  Bacterial/Viral Infection:  No    Missed Coumadin Doses:  None  Other Concerns:  No      ASSESSMENT/PLAN:  See: ANTICOAGULATION QIC flow sheet.    INR 2.7  Plan: Continue 7.5 mg daily = 52.5 mg/wk. Recheck INR in 3 weeks.  Sophia Hemphill RN      Dosage adjustment made based on physician directed care plan.    JIMI VOGT

## 2017-01-30 NOTE — PROGRESS NOTES
Quick Note:    Here are your lab results from your recent lab visit...  You may have seen these results by now, but your CBC labs (which includes blood labs looking for signs of infection or anemia) look a bit worse with the hemoglobin lowering a bit again (down from 10.4 to 9.8), and your white count is up again slightly (which can be a sign of infection- hard to know as I didn't see you the day of the lab draw). Your hemoglobin A1C (the three month average of your glucose levels) has also worsened. We'll want to discuss this further at your upcoming appointment, and potentially discuss adding medication.    Please let me know if you have any questions.  Best,   Lev Tristan MD  ______

## 2017-02-01 ENCOUNTER — CARE COORDINATION (OUTPATIENT)
Dept: CARDIOLOGY | Facility: CLINIC | Age: 77
End: 2017-02-01

## 2017-02-01 NOTE — PROGRESS NOTES
Patient called to report dizziness and balance issues since her recent medication adjustments (Toprol and Spironolactone increased).  She has not been monitoring home blood pressures, but will call back with BP.  She denies any syncope, SOB or chest pain.  She understands that she will receive a return call with further instructions.

## 2017-02-02 ENCOUNTER — CARE COORDINATION (OUTPATIENT)
Dept: CARDIOLOGY | Facility: CLINIC | Age: 77
End: 2017-02-02

## 2017-02-02 NOTE — PROGRESS NOTES
"S-(situation): patient calling in because she is having issues with the new medication changes from her last visit with Dr. Rosa. Her Spironolactone and Toprol SL were increased at that visit. Since that time she has been \"dizzy, not feeling well, not sleeping and her legs feel heavy\". She attributes this all to the medication increase. Blood pressure is satisfactory at 130/70.    B-(background): 77 yo F with Interstitial Lung disease (possible moderate restriction), Sjogren's syndrome, HFpEF, HTN, atrial fibrillation [id number] (age >75, HTN, CHF, gender), ANGELICA on CPAP, obesity, GI bleed, diverticulitis s/p colectomy 2011 who was referred to us by EP clinic to establish care for HFpEF    A-(assessment): dizziness with medication increase    R-(recommendations): asked patient to stay on the Spironolactone dose of 25 mg for now. Asked her to decrease the Toprol back to the 75 mg for one week and call in next week with symptoms. If improved we can increase the Toprol back to 100 mg. Encouraged fluids.    "

## 2017-02-08 DIAGNOSIS — F33.1 MAJOR DEPRESSIVE DISORDER, RECURRENT EPISODE, MODERATE (H): Primary | ICD-10-CM

## 2017-02-08 RX ORDER — PAROXETINE 20 MG/1
TABLET, FILM COATED ORAL
Start: 2017-02-08

## 2017-02-08 RX ORDER — PAROXETINE 20 MG/1
TABLET, FILM COATED ORAL
Qty: 90 TABLET | Refills: 1 | Status: SHIPPED | OUTPATIENT
Start: 2017-02-08 | End: 2017-03-08

## 2017-02-08 NOTE — TELEPHONE ENCOUNTER
Pending Prescriptions:                       Disp   Refills    PARoxetine (PAXIL) 20 MG tablet           90 tab*1               Last Written Prescription Date: 08/01/16  Last Fill Quantity: 90, # refills: 1  Last Office Visit with [facility] primary care provider:  12/30/16   Next 5 appointments (look out 90 days)     Feb 15, 2017 10:00 AM   Office Visit with Ilene Tristan MD   [facility]    [address]  [facility] [address] [phone]                   Last PHQ-9 score on record=   PHQ-9 SCORE 12/30/2016   Total Score -   Total Score MyChart -   Total Score 4

## 2017-02-08 NOTE — TELEPHONE ENCOUNTER
Routing refill request to provider for review/approval because:  Drug interaction warning: Paxil and Diclofenac (Medium Warning)  Lydia HALEY RN

## 2017-02-08 NOTE — TELEPHONE ENCOUNTER
Paxil      Last Written Prescription Date: 02/08/2017  Last Fill Quantity: 90, # refills: 1  Last Office Visit with [facility] primary care provider:  12/30/2016   Next 5 appointments (look out 90 days)     Feb 15, 2017 10:00 AM   Office Visit with Ilene Tristan MD   [facility] ([facility])    [address] [place]  [facility] [address] [phone]                   Last PHQ-9 score on record=   PHQ-9 SCORE 12/30/2016   Total Score -   Total Score MyChart -   Total Score 4

## 2017-02-17 ENCOUNTER — APPOINTMENT (OUTPATIENT)
Dept: GENERAL RADIOLOGY | Facility: CLINIC | Age: 77
End: 2017-02-17
Attending: EMERGENCY MEDICINE
Payer: MEDICARE

## 2017-02-17 ENCOUNTER — HOSPITAL ENCOUNTER (EMERGENCY)
Facility: CLINIC | Age: 77
Discharge: HOME | End: 2017-02-17
Attending: EMERGENCY MEDICINE | Admitting: EMERGENCY MEDICINE
Payer: MEDICARE

## 2017-02-17 VITALS
HEART RATE: 85 BPM | DIASTOLIC BLOOD PRESSURE: 72 MMHG | TEMPERATURE: 100.2 F | OXYGEN SATURATION: 100 % | SYSTOLIC BLOOD PRESSURE: 127 MMHG | RESPIRATION RATE: 18 BRPM

## 2017-02-17 DIAGNOSIS — Z79.01 LONG TERM CURRENT USE OF ANTICOAGULANT THERAPY: ICD-10-CM

## 2017-02-17 DIAGNOSIS — M25.562 LEFT KNEE PAIN, UNSPECIFIED CHRONICITY: ICD-10-CM

## 2017-02-17 LAB — INR PPP: 2.5 (ref 0.86–1.14)

## 2017-02-17 PROCEDURE — 99283 EMERGENCY DEPT VISIT LOW MDM: CPT | Mod: Z6 | Performed by: EMERGENCY MEDICINE

## 2017-02-17 PROCEDURE — 99284 EMERGENCY DEPT VISIT MOD MDM: CPT | Mod: 25

## 2017-02-17 PROCEDURE — 25000132 ZZH RX MED GY IP 250 OP 250 PS 637: Mod: GY | Performed by: EMERGENCY MEDICINE

## 2017-02-17 PROCEDURE — 85610 PROTHROMBIN TIME: CPT | Performed by: EMERGENCY MEDICINE

## 2017-02-17 PROCEDURE — A9270 NON-COVERED ITEM OR SERVICE: HCPCS | Mod: GY | Performed by: EMERGENCY MEDICINE

## 2017-02-17 PROCEDURE — 73562 X-RAY EXAM OF KNEE 3: CPT | Mod: LT

## 2017-02-17 PROCEDURE — 29505 APPLICATION LONG LEG SPLINT: CPT | Mod: LT

## 2017-02-17 RX ORDER — HYDROCODONE BITARTRATE AND ACETAMINOPHEN 5; 325 MG/1; MG/1
1 TABLET ORAL ONCE
Status: COMPLETED | OUTPATIENT
Start: 2017-02-17 | End: 2017-02-17

## 2017-02-17 RX ORDER — HYDROCODONE BITARTRATE AND ACETAMINOPHEN 5; 325 MG/1; MG/1
1-2 TABLET ORAL EVERY 4 HOURS PRN
Qty: 15 TABLET | Refills: 0 | Status: SHIPPED | OUTPATIENT
Start: 2017-02-17 | End: 2017-04-03

## 2017-02-17 RX ADMIN — HYDROCODONE BITARTRATE AND ACETAMINOPHEN 1 TABLET: 5; 325 TABLET ORAL at 18:45

## 2017-02-17 NOTE — ED AVS SNAPSHOT
[facility] [place], Emergency Department    [address]    [facility] [place] [address]    Phone:  [phone]    Fax:  [phone]                                       Betty Tee   MRN: [id number]    Department:  [facility], Emergency Department   Date of Visit:  2/17/2017           After Visit Summary Signature Page     I have received my discharge instructions, and my questions have been answered. I have discussed any challenges I see with this plan with the nurse or doctor.    ..........................................................................................................................................  Patient/Patient Representative Signature      ..........................................................................................................................................  Patient Representative Print Name and Relationship to Patient    ..................................................               ................................................  Date                                            Time    ..........................................................................................................................................  Reviewed by Signature/Title    ...................................................              ..............................................  Date                                                            Time

## 2017-02-17 NOTE — ED AVS SNAPSHOT
[facility], Emergency Department    [address]    [place] [address]    Phone:  [phone]    Fax:  [phone]                                       Betty Tee   MRN: [id number]    Department:  [facility], Emergency Department   Date of Visit:  2/17/2017           Patient Information     Date Of Birth          1940        Your diagnoses for this visit were:     Left knee pain, unspecified chronicity        You were seen by Tito Grier MD.        Discharge Instructions         Ace Wrap  Minor muscle or joint injuries are often treated with an elastic bandage. The bandage provides support and compression to the injured area. An elastic bandage is a stretchy, rolled bandage. Elastic bandages range in width from 2 to 6 inches. They can be used for a variety of injuries. The bandages are often called  ACE  bandages, after the most common brand name.  If used correctly, elastic bandages help control swelling and ease pain. An elastic bandage is also a good reminder not to overuse the injured area. However, elastic bandages do not provide a lot of support and will not prevent reinjury.  Home care  To apply an elastic bandage:    Check the skin before wrapping the injury. It should be clean, dry, and free of drainage.    Start wrapping below the injury and work your way toward the body. For an ankle sprain, start wrapping around the foot and work up toward the calf. This will help control swelling.    Overlap the edges of the bandage so it stays snuggly in place.    Wrap the bandage firmly, but not too tightly. A tight bandage can increase swelling on either end of the bandage. Make sure the bandage is wrinkle free.    Leave fingers and toes exposed.    Secure ends of the bandage (even self-sticking ones) with clips or tape.    Check frequently to ensure adequate circulation, especially in the fingers and toes. Loosen the bandage if there is local swelling, numbness, tingling,  discomfort, coldness, or discoloration (skin pale or bluish in color).    Rewrap the bandage as needed during the day. Reroll the bandage as you unwind it.  Continue using the elastic bandage until the pain and swelling are gone or as your healthcare provider advises.  If you have been told to ice the area, the ice can be secured in place with the elastic bandage. Wrap the ice pack with a thin towel to protect the skin. Do not put ice or an ice pack directly on the skin.  Ice the area for no more than 20 minutes at a time.    Follow-up care  Follow up with your healthcare provider, as advised.  When to seek medical advice  Call your healthcare provider for any of the following:    Pain and swelling that doesn't get better or gets worse    Trouble moving injured area    Skin discoloration, numbness, or tingling that doesn t go away after bandage is removed    2995-1992 [employer]. [address] [place] [address]. All rights reserved. This information is not intended as a substitute for professional medical care. Always follow your healthcare professional's instructions.          Reducing Knee Pain and Swelling    Many treatments can help reduce pain and swelling in your knee. Your healthcare provider or physical therapist may suggest one or more of the following treatments:    Icing your knee helps reduce swelling. You may be asked to ice your knee once a day or more. Apply ice for about 15 to 20 minutes at a time, with at least 40 minutes between sessions. Always keep a towel between the ice and your skin.     Keeping your leg raised above your heart helps excess fluid flow out of your knee joint. This reduces swelling.    Compression means wrapping an elastic bandage or neoprene sleeve snugly around your knees. This keeps fluid from collecting in your knee joint.    Electrical stimulation, done by a physical therapist or , can help reduce excess fluid in your knee  joint.    Anti-inflammatory medicines may be prescribed by your healthcare provider. You may take pills or receive injections in your knee.    Isometric (sugar) exercises strengthen the muscles that support your knee joint. They also help reduce excess fluid in your knee.    Massage helps fluid drain away from your knee.    8225-3501 [employer]. [address] [place] [address]. All rights reserved. This information is not intended as a substitute for professional medical care. Always follow your healthcare professional's instructions.          Future Appointments        Provider Department Dept Phone Center    3/22/2017 11:40 AM [facility] CT ROOM 1 [facility] Imaging Center [phone] [facility]    3/22/2017 12:00 PM Pulmonary Function Lab [facility] Pulmonary Function Testing [phone] [facility]    3/22/2017 1:00 PM Meño Walsh MD [facility] [phone] [facility]    6/22/2017 4:30 PM Lab [facility] Lab [phone] [facility]    6/22/2017 5:00 PM Radha Rosa MD [facility] Heart Care [phone] [facility]      24 Hour Appointment Hotline       To make an appointment at any [facility], call [phone]). If you don't have a family doctor or clinic, we will help you find one. [facility] clinics are conveniently located to serve the needs of you and your family.          ED Discharge Orders     Knee immobilizer                    Review of your medicines      START taking        Dose / Directions Last dose taken    HYDROcodone-acetaminophen 5-325 MG per tablet   Commonly known as:  NORCO   Dose:  1-2 tablet   Quantity:  15 tablet        Take 1-2 tablets by mouth every 4 hours as needed for moderate to severe pain   Refills:  0          Our records show that you are taking the medicines listed below. If these are incorrect, please call your family doctor or clinic.        Dose / Directions Last dose taken    acyclovir 400 MG  tablet   Commonly known as:  ZOVIRAX   Dose:  400 mg        Take 400 mg by mouth See Admin Instructions 5 times daily as needed for outbreaks   Refills:  0        acyclovir 5 % ointment   Commonly known as:  ZOVIRAX   Quantity:  15 g        Apply topically 6 times daily   Refills:  3        albuterol 108 (90 BASE) MCG/ACT Inhaler   Commonly known as:  PROAIR HFA/PROVENTIL HFA/VENTOLIN HFA   Dose:  2 puff   Quantity:  1 Inhaler        Inhale 2 puffs into the lungs every 6 hours   Refills:  3        azithromycin 250 MG tablet   Commonly known as:  ZITHROMAX   Dose:  250 mg   Quantity:  30 tablet        Take 1 tablet (250 mg) by mouth daily   Refills:  11        calcium-vitamin D 600-400 MG-UNIT per tablet   Commonly known as:  CALTRATE   Dose:  1 tablet   Quantity:  60 tablet        Take 1 tablet by mouth 2 times daily   Refills:  0        COMPRESSION STOCKINGS   Quantity:  2 each        Wear compression stockings in affected leg (right leg) or both legs most of the time during the day and take them off at night.   Refills:  2        diclofenac 1 % Gel topical gel   Commonly known as:  VOLTAREN   Quantity:  100 g        Apply 4 grams to knees or 2 grams to hands four times daily using enclosed dosing card.   Refills:  1        fluticasone 220 MCG/ACT Inhaler   Commonly known as:  FLOVENT HFA   Dose:  2 puff   Quantity:  3 Inhaler        Inhale 2 puffs into the lungs 2 times daily   Refills:  3        fluticasone 50 MCG/ACT spray   Commonly known as:  FLONASE   Dose:  1-2 spray   Quantity:  16 g        Spray 1-2 sprays into both nostrils daily   Refills:  5        furosemide 40 MG tablet   Commonly known as:  LASIX   Dose:  40 mg   Quantity:  180 tablet        Take 1 tablet (40 mg) by mouth 2 times daily   Refills:  3        ketoconazole 2 % cream   Commonly known as:  NIZORAL   Quantity:  60 g        Apply topically 2 times daily   Refills:  1        metoprolol 100 MG 24 hr tablet   Commonly known as:  TOPROL-XL    Dose:  100 mg   Quantity:  90 tablet        Take 1 tablet (100 mg) by mouth daily   Refills:  3        montelukast 10 MG tablet   Commonly known as:  SINGULAIR   Dose:  10 mg   Quantity:  90 tablet        Take 1 tablet (10 mg) by mouth At Bedtime   Refills:  1        order for DME   Quantity:  1 Device        Oxygen: Patient requires supplemental Oxygen 4 LPM via nasal canula with activity. Please provide patient with a home unit and with portability capability. Oxygen will be for a lifetime.   Refills:  0        pantoprazole 40 MG EC tablet   Commonly known as:  PROTONIX   Dose:  40 mg   Quantity:  90 tablet        Take 1 tablet (40 mg) by mouth daily   Refills:  3        PARoxetine 20 MG tablet   Commonly known as:  PAXIL   Quantity:  90 tablet        TAKE 1 TABLET (20 MG) BY MOUTH AT BEDTIME   Refills:  1        polyethylene glycol Packet   Commonly known as:  MIRALAX/GLYCOLAX   Dose:  17 g   Quantity:  7 packet        Take 17 g by mouth daily as needed for constipation   Refills:  0        predniSONE 10 MG tablet   Commonly known as:  DELTASONE   Dose:  10 mg   Quantity:  90 tablet        Take 1 tablet (10 mg) by mouth daily   Refills:  3        spironolactone 25 MG tablet   Commonly known as:  ALDACTONE   Dose:  25 mg   Quantity:  90 tablet        Take 1 tablet (25 mg) by mouth daily   Refills:  3        traZODone 50 MG tablet   Commonly known as:  DESYREL   Quantity:  60 tablet        TAKE 1/2-1 TABLET BY MOUTH NIGHTLY AS NEEDED, CAN TITRATE DOWN TO 1/2TAB OR UP TO 2TABS AS NEEDED   Refills:  1        TYLENOL 500 MG tablet   Dose:  500 mg   Generic drug:  acetaminophen        Take 500 mg by mouth nightly as needed   Refills:  0        warfarin 7.5 MG tablet   Commonly known as:  COUMADIN   Quantity:  100 tablet        Current dose is 7.5 mg daily.  Dose adjusted per INR result.   Refills:  3                Prescriptions were sent or printed at these locations (1 Prescription)                   Other  Prescriptions                Printed at Department/Unit printer (1 of 1)         HYDROcodone-acetaminophen (NORCO) 5-325 MG per tablet                Procedures and tests performed during your visit     INR    Knee XR, 3 views, left      Orders Needing Specimen Collection     None      Pending Results     Date and Time Order Name Status Description    2/17/2017 1751 Knee XR, 3 views, left Preliminary             Pending Culture Results     No orders found from 2/15/2017 to 2/18/2017.            Thank you for choosing [facility]       Thank you for choosing [facility] for your care. Our goal is always to provide you with excellent care. Hearing back from our patients is one way we can continue to improve our services. Please take a few minutes to complete the written survey that you may receive in the mail after you visit with us. Thank you!        [employer] Information     [employer] gives you secure access to your electronic health record. If you see a primary care provider, you can also send messages to your care team and make appointments. If you have questions, please call your primary care clinic.  If you do not have a primary care provider, please call [phone] and they will assist you.        Care EveryWhere ID     This is your Care EveryWhere ID. This could be used by other organizations to access your [facility] medical records  [id number]        After Visit Summary       This is your record. Keep this with you and show to your community pharmacist(s) and doctor(s) at your next visit.

## 2017-02-18 NOTE — DISCHARGE INSTRUCTIONS
Ace Wrap  Minor muscle or joint injuries are often treated with an elastic bandage. The bandage provides support and compression to the injured area. An elastic bandage is a stretchy, rolled bandage. Elastic bandages range in width from 2 to 6 inches. They can be used for a variety of injuries. The bandages are often called  ACE  bandages, after the most common brand name.  If used correctly, elastic bandages help control swelling and ease pain. An elastic bandage is also a good reminder not to overuse the injured area. However, elastic bandages do not provide a lot of support and will not prevent reinjury.  Home care  To apply an elastic bandage:    Check the skin before wrapping the injury. It should be clean, dry, and free of drainage.    Start wrapping below the injury and work your way toward the body. For an ankle sprain, start wrapping around the foot and work up toward the calf. This will help control swelling.    Overlap the edges of the bandage so it stays snuggly in place.    Wrap the bandage firmly, but not too tightly. A tight bandage can increase swelling on either end of the bandage. Make sure the bandage is wrinkle free.    Leave fingers and toes exposed.    Secure ends of the bandage (even self-sticking ones) with clips or tape.    Check frequently to ensure adequate circulation, especially in the fingers and toes. Loosen the bandage if there is local swelling, numbness, tingling, discomfort, coldness, or discoloration (skin pale or bluish in color).    Rewrap the bandage as needed during the day. Reroll the bandage as you unwind it.  Continue using the elastic bandage until the pain and swelling are gone or as your healthcare provider advises.  If you have been told to ice the area, the ice can be secured in place with the elastic bandage. Wrap the ice pack with a thin towel to protect the skin. Do not put ice or an ice pack directly on the skin.  Ice the area for no more than 20 minutes at a  time.    Follow-up care  Follow up with your healthcare provider, as advised.  When to seek medical advice  Call your healthcare provider for any of the following:    Pain and swelling that doesn't get better or gets worse    Trouble moving injured area    Skin discoloration, numbness, or tingling that doesn t go away after bandage is removed    9070-0945 [employer]. [address] [place] [address]. All rights reserved. This information is not intended as a substitute for professional medical care. Always follow your healthcare professional's instructions.          Reducing Knee Pain and Swelling    Many treatments can help reduce pain and swelling in your knee. Your healthcare provider or physical therapist may suggest one or more of the following treatments:    Icing your knee helps reduce swelling. You may be asked to ice your knee once a day or more. Apply ice for about 15 to 20 minutes at a time, with at least 40 minutes between sessions. Always keep a towel between the ice and your skin.     Keeping your leg raised above your heart helps excess fluid flow out of your knee joint. This reduces swelling.    Compression means wrapping an elastic bandage or neoprene sleeve snugly around your knees. This keeps fluid from collecting in your knee joint.    Electrical stimulation, done by a physical therapist or , can help reduce excess fluid in your knee joint.    Anti-inflammatory medicines may be prescribed by your healthcare provider. You may take pills or receive injections in your knee.    Isometric (sugar) exercises strengthen the muscles that support your knee joint. They also help reduce excess fluid in your knee.    Massage helps fluid drain away from your knee.    0328-5672 [employer]. [address] [place] [address]. All rights reserved. This information is not intended as a substitute for professional medical care. Always follow your  healthcare professional's instructions.

## 2017-02-18 NOTE — ED PROVIDER NOTES
History     Chief Complaint   Patient presents with     Knee Pain     pt fell on left knee 2 days ago     HPI  Betty Tee is a 76 year old female with a complex past medical history including HFpEF, AFib, history of  DVT, chronic anticoagulation on Coumadin, ANGELICA, ILD, on chronic at-home oxygen, DM type 2, fatty liver disease, and hypertension who presents for evaluation of left knee pain. The patient reports that on Wednesday, 2 days ago, she was running in her home when she tripped on something on the floor. She states that she fell onto her left knee. She denies hitting her head or any loss of consciousness. She comes in with complaint of persisting pain of the left knee, resultant difficulty with weight-bearing. She has a history of fractures but states this does not feel like previous fractures. The patient reports that she's been taking Tylenol for this pain, which initially did give her relief, but this has not been as effective in relieving pain today. She also has been trying to ice the knee.     I have reviewed the Medications, Allergies, Past Medical and Surgical History, and Social History in the Epic system.    No current facility-administered medications for this encounter.      Current Outpatient Prescriptions   Medication     PARoxetine (PAXIL) 20 MG tablet     furosemide (LASIX) 40 MG tablet     metoprolol (TOPROL-XL) 100 MG 24 hr tablet     spironolactone (ALDACTONE) 25 MG tablet     pantoprazole (PROTONIX) 40 MG EC tablet     ketoconazole (NIZORAL) 2 % cream     montelukast (SINGULAIR) 10 MG tablet     traZODone (DESYREL) 50 MG tablet     albuterol (PROAIR HFA, PROVENTIL HFA, VENTOLIN HFA) 108 (90 BASE) MCG/ACT inhaler     order for DME     azithromycin (ZITHROMAX) 250 MG tablet     diclofenac (VOLTAREN) 1 % GEL     warfarin (COUMADIN) 7.5 MG tablet     fluticasone (FLOVENT HFA) 220 MCG/ACT inhaler     acyclovir (ZOVIRAX) 400 MG tablet     fluticasone (FLONASE) 50 MCG/ACT nasal spray      COMPRESSION STOCKINGS     calcium-vitamin D (CALTRATE) 600-400 MG-UNIT per tablet     polyethylene glycol (MIRALAX/GLYCOLAX) packet     acyclovir (ZOVIRAX) 5 % ointment     predniSONE (DELTASONE) 10 MG tablet     acetaminophen (TYLENOL) 500 MG tablet     Past Medical History   Diagnosis Date     Alcohol abuse, in remission      Allergic rhinitis, cause unspecified      allegra helps when she takes it     Antiplatelet or antithrombotic long-term use      Atrial fibrillation (H)      in hosp in 11/11 after surgery w/ fluid overload     Cardiomegaly      LVH on stress echo- cardiac w/u at [facility] ER- neg CT scan for PE, neg stress echo in 8/06     Chest pain, unspecified      Disorder of bone and cartilage, unspecified      osteopenia (had been on prempro), improved on 6/06 dexa, stable dexa 11/10     Diverticulosis of colon (without mention of hemorrhage)      last episode yrs ago     Essential hypertension, benign      Gastro-oesophageal reflux disease      Insomnia, unspecified      weaned off clonazepam     Irregular heart beat      Lumbago 7/09     MRI with DJD, now seeing Dr. Cain for sciatic sx's     Major depressive disorder, recurrent episode, moderate (H)      Obstructive sleep apnea      Osteoarthrosis, unspecified whether generalized or localized, unspecified site      Sjogren's syndrome (H)      Sleep apnea      Tobacco use disorder      chantix in 9/07, started again in 6/08, working       Past Surgical History   Procedure Laterality Date     Cholecystectomy  1990's?     Biopsy breast  9/27/02     Biopsy Left Breast     C appendectomy  1970's?     Back surgery  1962     C nonspecific procedure  11/05     exploratory abd lap, adhesions, resolved RLQ pain, diverticulitis episodes     Sigmoidoscopy flexible  11/3/2011     Procedure:SIGMOIDOSCOPY FLEXIBLE; Flexible Sigmoidoscopy; Surgeon:CK CASTANEDA; Location:[facility] OR     Insert stent ureter  11/7/2011     Procedure:INSERT STENT URETER; Placement of  Bilateral Ureteral Stents ; Surgeon:PRANEETH BRYANT; Location:[facility] OR     Colectomy left  11/7/2011     Procedure:COLECTOMY LEFT; Laparoscopic mobilization of splenic flexture, sigmoid colectomy, coloprotoscopy, loop illeostomy; Surgeon:CK CASTANEDA; Location:[facility] OR     Hysterectomy total abdominal, bilateral salpingo-oophorectomy, combined  11/7/2011     Procedure:COMBINED HYSTERECTOMY TOTAL ABDOMINAL, BILATERAL SALPINGO-OOPHORECTOMY; total abdominal hysterectomy, bilateral salpingo-oophorectomy; Surgeon:ALETA MANUEL; Location:[facility] OR     Takedown ileostomy  2/1/2012     Procedure:TAKEDOWN ILEOSTOMY; Takedown Loop Ileostomy ; Surgeon:CK CASTANEDA; Location:[facility] OR     Cardiac surgery         Family History   Problem Relation Age of Onset     C.A.D. Mother 63     MI- first at age 63     C.A.D. Sister 52     Minor MI- age 50's     HEART DISEASE Mother      HEART DISEASE Sister      Hypertension Mother      Hypertension Sister      Hypertension Sister      Hypertension Brother      CEREBROVASCULAR DISEASE Mother      Cancer - colorectal Sister 48     Late 40's early 50's     Prostate Cancer Brother 74     Dx'd age 74     Alcohol/Drug Father      Alzheimer Disease Father      GASTROINTESTINAL DISEASE Sister      Diverticulitis     GASTROINTESTINAL DISEASE Brother      Diverticulitis     Lipids Sister      Lipids Sister      Parkinsonism Brother      DIABETES Sister      HEART DISEASE Sister      CHF     CANCER Sister      lung, smoker     Substance Abuse Sister      Substance Abuse Brother      Dementia Father      Asthma Sister      CANCER Sister      Substance Abuse Sister      Substance Abuse Brother      Asthma Sister      Hypertension Father      Breast Cancer Daughter      Prostate Cancer Brother      Hyperlipidemia Mother      Hyperlipidemia Father      Hyperlipidemia Brother        Social History   Substance Use Topics     Smoking status: Former Smoker     Packs/day: 0.50     Years: 10.00     Types:  Cigarettes     Quit date: 8/1/2011     Smokeless tobacco: Never Used      Comment: 1/2 ppd     Alcohol use No      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        Allergies   Allergen Reactions     Augmentin Nausea and Vomiting     Codeine Nausea and Vomiting     Phenobarbital Itching     Review of Systems   ROS: 10 point ROS neg other than the symptoms noted above in the HPI.    Physical Exam   BP: 130/52  Pulse: 102  Temp: 100.8  F (38.2  C)  Resp: 14  SpO2: 94 %  Physical Exam  Exam:  Constitutional: healthy, alert and no distress  Head: Normocephalic. No masses, lesions, tenderness or abnormalities  Neck: Neck supple. No adenopathy. Thyroid symmetric, normal size,, Carotids without bruits.  ENT: ENT exam normal, no neck nodes or sinus tenderness  Cardiovascular: negative, PMI normal. No lifts, heaves, or thrills. RRR. No murmurs, clicks gallops or rub  Respiratory: negative, Percussion normal. Good diaphragmatic excursion. Lungs clear  Gastrointestinal: Abdomen soft, non-tender. BS normal. No masses, organomegaly  : Deferred  Musculoskeletal: L knee: tender to palp with mild swelling  extremities normal- no gross deformities noted, gait normal and normal muscle tone  Skin: no suspicious lesions or rashes  Neurologic: Gait normal. Reflexes normal and symmetric. Sensation grossly WNL.  Psychiatric: mentation appears normal and affect normal/bright  Hematologic/Lymphatic/Immunologic: Normal cervical lymph nodes    ED Course     ED Course     6:30 PM  The patient was seen and examined by Tito Grier MD, in Room 06.     Procedures             Labs Ordered and Resulted from Time of ED Arrival Up to the Time of Departure from the ED - No data to display    Assessments & Plan (with Medical Decision Making)   76-year-old female who slipped and fell 2 days ago and fell on the left knee.  Patient states that since then she has had increasing in pain and difficulty putting any weight on it.  Patient is also on Coumadin for  atrial fibrillation and she has been taking it regularly.  Patient denies any other complaints at this time.  No numbness tingling or weakness.  No shortness of breath aside from her baseline.  Physical exam reveals a possible fracture of the left knee and x-ray was ordered.  There is no evidence of any DVT or posterior calf or thigh swelling or tenderness.    Knee XR, 3 views, left (Preliminary result) Result time: 02/17/17 19:05:49     Preliminary result by Interface, Radiant Ib (02/17/17 19:05:49)     Impression:     IMPRESSION:   1. No fractures are identified.  2. Degenerative change.     Narrative:     XR KNEE LT 3 VW    2/17/2017 7:00 PM      HISTORY: s/p fall    COMPARISON: None.    FINDINGS:  There is normal osseous alignment.  No fractures are  identified.  There are small spurs off the superior and inferior  patella. Small spur off the lateral joint compartment and the medial  joint compartment. Probable small knee joint effusion. Small amount of  chondrocalcinosis is noted.               X-ray did not show any evidence of any fracture.  Knee immobilizer in place per ERT and patient will be discharged home with pain medication and follow-up with primary care physician    I have reviewed the nursing notes.    I have reviewed the findings, diagnosis, plan and need for follow up with the patient.    New Prescriptions    No medications on file       Final diagnoses:   None     Abner SHERIDAN, am serving as a trained medical scribe to document services personally performed by Tito Grier MD, based on the provider's statements to me.      Tito SHERIDAN MD, was physically present and have reviewed and verified the accuracy of this note documented by Abner Mancuos.    2/17/2017   [facility], EMERGENCY DEPARTMENT     Tito Grier MD  02/17/17 2008

## 2017-02-23 DIAGNOSIS — M35.00 SJOGREN'S SYNDROME (H): ICD-10-CM

## 2017-02-23 DIAGNOSIS — J84.9 ILD (INTERSTITIAL LUNG DISEASE) (H): ICD-10-CM

## 2017-02-23 RX ORDER — PREDNISONE 10 MG/1
10 TABLET ORAL DAILY
Qty: 90 TABLET | Refills: 3 | Status: ON HOLD | OUTPATIENT
Start: 2017-02-23 | End: 2017-04-11

## 2017-02-23 RX ORDER — PREDNISONE 10 MG/1
10 TABLET ORAL DAILY
Qty: 90 TABLET | Refills: 0
Start: 2017-02-23

## 2017-02-23 NOTE — TELEPHONE ENCOUNTER
CW  Routing refill request to provider for review/approval because:  Drug not on the [facility] refill protocol   Please authorize if appropriate.  Thanks, Sophia Hemphill RN

## 2017-02-23 NOTE — TELEPHONE ENCOUNTER
Pending Prescriptions:                       Disp   Refills    predniSONE (DELTASONE) 10 MG tablet       90 tab*3            Sig: Take 1 tablet (10 mg) by mouth daily      Last Written Prescription Date: 01/08/2016  Last Fill Quantity: 90,  # refills: 3   Last Office Visit with [facility], [facility] or [facility] prescribing provider: 12/30/2016                                         Next 5 appointments (look out 90 days)     Mar 08, 2017  1:30 PM CST   Office Visit with Ilene Tristan MD   [facility] ([facility])    [address] [place] [address] [phone]

## 2017-02-23 NOTE — TELEPHONE ENCOUNTER
Prednisone rx's have been managed and prescribed by pulmonary- please instruct the pharmacy to direct to the pulmonary provider.  Thank you,  Lev Tristan MD  [facility]  [phone]

## 2017-03-01 ENCOUNTER — TELEPHONE (OUTPATIENT)
Dept: FAMILY MEDICINE | Facility: CLINIC | Age: 77
End: 2017-03-01

## 2017-03-01 NOTE — TELEPHONE ENCOUNTER
Reason for Call:   prescription    Detailed comments: Igor chen pharmacist from the Pt insurance company  [facility] called wanting to discuss alternative medications for the Pt to take    Pt is currently taking PARoxetine (PAXIL) 20 MG tablet and   pantoprazole (PROTONIX) 40 MG EC tablet  Instead of Paxil he is suggesting bupropione, celexa or Lexapro  For the Protonix he is suggesting zantac or pepcid.   He is requesting a call to further discuss this he can be reached at   [phone] and to ask for Igor        Call taken on 3/1/2017 at 3:28 PM by Kami Shannon

## 2017-03-03 NOTE — TELEPHONE ENCOUNTER
Tried to call back- got msg, unable to leave msg.  If he calls again, have him talk to triage and give his reasoning.  Thanks-   CW

## 2017-03-08 ENCOUNTER — OFFICE VISIT (OUTPATIENT)
Dept: FAMILY MEDICINE | Facility: CLINIC | Age: 77
End: 2017-03-08
Payer: MEDICARE

## 2017-03-08 VITALS
WEIGHT: 206.2 LBS | HEIGHT: 67 IN | BODY MASS INDEX: 32.36 KG/M2 | HEART RATE: 88 BPM | TEMPERATURE: 97.5 F | RESPIRATION RATE: 18 BRPM | DIASTOLIC BLOOD PRESSURE: 70 MMHG | OXYGEN SATURATION: 96 % | SYSTOLIC BLOOD PRESSURE: 130 MMHG

## 2017-03-08 DIAGNOSIS — I48.91 ATRIAL FIBRILLATION WITH CONTROLLED VENTRICULAR RESPONSE (H): ICD-10-CM

## 2017-03-08 DIAGNOSIS — K21.9 GASTROESOPHAGEAL REFLUX DISEASE WITHOUT ESOPHAGITIS: ICD-10-CM

## 2017-03-08 DIAGNOSIS — I50.30 (HFPEF) HEART FAILURE WITH PRESERVED EJECTION FRACTION (H): ICD-10-CM

## 2017-03-08 DIAGNOSIS — E11.9 TYPE 2 DIABETES MELLITUS WITHOUT COMPLICATION, WITHOUT LONG-TERM CURRENT USE OF INSULIN (H): Primary | ICD-10-CM

## 2017-03-08 DIAGNOSIS — G47.01 INSOMNIA DUE TO MEDICAL CONDITION: ICD-10-CM

## 2017-03-08 DIAGNOSIS — F33.1 MAJOR DEPRESSIVE DISORDER, RECURRENT EPISODE, MODERATE (H): ICD-10-CM

## 2017-03-08 DIAGNOSIS — L65.9 HAIR LOSS: ICD-10-CM

## 2017-03-08 PROCEDURE — 99215 OFFICE O/P EST HI 40 MIN: CPT | Performed by: FAMILY MEDICINE

## 2017-03-08 RX ORDER — TRAZODONE HYDROCHLORIDE 50 MG/1
TABLET, FILM COATED ORAL
Qty: 60 TABLET | Refills: 3 | Status: SHIPPED | OUTPATIENT
Start: 2017-03-08 | End: 2017-05-30

## 2017-03-08 RX ORDER — METFORMIN HCL 500 MG
500 TABLET, EXTENDED RELEASE 24 HR ORAL
Qty: 30 TABLET | Refills: 1 | Status: SHIPPED | OUTPATIENT
Start: 2017-03-08 | End: 2017-03-28

## 2017-03-08 RX ORDER — PANTOPRAZOLE SODIUM 20 MG/1
20-40 TABLET, DELAYED RELEASE ORAL DAILY
Qty: 60 TABLET | Refills: 1 | Status: SHIPPED | OUTPATIENT
Start: 2017-03-08 | End: 2018-01-02

## 2017-03-08 RX ORDER — PAROXETINE 10 MG/1
TABLET, FILM COATED ORAL
Qty: 30 TABLET | Refills: 1 | Status: SHIPPED | OUTPATIENT
Start: 2017-03-08 | End: 2017-05-30

## 2017-03-08 NOTE — MR AVS SNAPSHOT
After Visit Summary   3/8/2017    Betty Tee    MRN: [id number]           Patient Information     Date Of Birth          1940        Visit Information        Provider Department      3/8/2017 1:30 PM Ilene Tristan MD [facility]        Today's Diagnoses     Type 2 diabetes mellitus without complication, without long-term current use of insulin (H)    -  1    Gastroesophageal reflux disease without esophagitis        Major depressive disorder, recurrent episode, moderate        Hair loss          Care Instructions    GERD- try decrease pantoprazole to 20mg/day, but start with alternating 40mg one day and 20 mg next day.  If no return of GERD sx's, go down to 20mg daily.    Anxiety- Paxil - will go down to 1/2 tab, 10mg dose until next visit.  If return of symptoms, or even if not, will look into another type of medication in that category (lexapro, celexa, zoloft).    Metformin- 500mg daily.  If too many GI sx's, go to a 1/2 tab (250mg) daily.    F/u in a month with a 45 min visit.  Try and see SIM Nicole, as soon as you can get back for formal diabetic counseling.          Follow-ups after your visit        Additional Services     DERMATOLOGY REFERRAL       Your provider has referred you to: [facility]: Dermatology Clinic - [place] [phone]   [email or web address]  [facility]: Academic Dermatology - [place] [phone]   [email or web address]    Please be aware that coverage of these services is subject to the terms and limitations of your health insurance plan.  Call member services at your health plan with any benefit or coverage questions.      Please bring the following with you to your appointment:    (1) Any X-Rays, CTs or MRIs which have been performed.  Contact the facility where they were done to arrange for  prior to your scheduled appointment.    (2) List of current medications  (3) This referral request    (4) Any documents/labs given to you for this referral            MED THERAPY MANAGE REFERRAL       Your provider has referred you to: **[facility] Scheduling (numerous locations) [phone]   [email or web address]  [facility]: [facility] [phone]   [email or web address]    Reason for Referral: Newer diabetes diagnosis, medication review, diabetic diet training    The [facility] department will contact you to schedule an appointment.  You may also schedule the appointment by calling [phone].  For [facility] patients, please call [phone] to confirm/schedule your appointment on the next business day.    This service is designed to help you get the most from your medications.  A specially trained Pharmacist will work closely with you and your providers to solve any questions, concerns, issues or problems related to your medications.    Please bring all of your prescription and non-prescription medications (such as vitamins, over-the-counter medications, and herbals) or a detailed medication list to your appointment.    If you have a glucose meter or other home monitoring information, please also bring this to your appointment (i.e. blood glucose log, blood pressure log, pain log, etc.).                  Your next 10 appointments already scheduled     Mar 22, 2017 11:40 AM CDT   (Arrive by 11:25 AM)   CT CHEST W/O CONTRAST with UCCT2   [facility] ([facility])    [address] [address] [phone]           Please bring any scans or X-rays taken at other hospitals, if similar tests were done. Also bring a list of your medicines, including vitamins, minerals and over-the-counter drugs. It is safest to leave personal items at home.  Be sure to tell your  doctor:   If you have any allergies.   If there s any chance you are pregnant.   If you are breastfeeding.   If you have any special needs.  You do not need to do anything special to prepare.  Please wear loose clothing, such as a sweat suit or jogging clothes. Avoid snaps, zippers and other metal. We may ask you to undress and put on a hospital gown.            Mar 22, 2017 12:00 PM CDT   FULL PULMONARY FUNCTION with  PFL A   [facility] ([facility])    [address] [place] [id number]   [phone]            Mar 22, 2017  1:00 PM CDT   (Arrive by 12:45 PM)   Return Interstitial Lung with Meño Walsh MD   [facility] ([facility])    [address] [place] [address]   [phone]            Jun 22, 2017  4:30 PM CDT   Lab with  LAB   [facility] ([facility])    [address] [place] [id number] [phone]            Jun 22, 2017  5:00 PM CDT   (Arrive by 4:45 PM)   RETURN HEART FAILURE with Radha Rosa MD   [facility] ([facility])    [address] [place] [id number] [phone]              Who to contact     If you have questions or need follow up information about today's clinic visit or your schedule please contact [facility] directly at [phone].  Normal or non-critical lab and imaging results will be communicated to you by MyChart, letter or phone within 4 business days after the clinic has received the results. If you do not hear from us within 7 days, please contact the clinic through MyChart or phone. If you have a critical or abnormal lab result, we will notify you by phone as soon as possible.  Submit refill requests through [employer] or call your pharmacy and they will forward the refill request to us.  "Please allow 3 business days for your refill to be completed.          Additional Information About Your Visit        MyChart Information     [facility] gives you secure access to your electronic health record. If you see a primary care provider, you can also send messages to your care team and make appointments. If you have questions, please call your primary care clinic.  If you do not have a primary care provider, please call [phone] and they will assist you.        Care EveryWhere ID     This is your Care EveryWhere ID. This could be used by other organizations to access your [facility] medical records  [id number]        Your Vitals Were     Pulse Temperature Respirations Height Pulse Oximetry Breastfeeding?    88 97.5  F (36.4  C) (Oral) 18 5' 6.75\" (1.695 m) 96% No    BMI (Body Mass Index)                   32.54 kg/m2            Blood Pressure from Last 3 Encounters:   03/08/17 130/70   02/17/17 127/72   01/12/17 116/64    Weight from Last 3 Encounters:   03/08/17 206 lb 3.2 oz (93.5 kg)   01/12/17 218 lb (98.9 kg)   12/30/16 218 lb (98.9 kg)              We Performed the Following     DERMATOLOGY REFERRAL     MED THERAPY MANAGE REFERRAL          Today's Medication Changes          These changes are accurate as of: 3/8/17  2:52 PM.  If you have any questions, ask your nurse or doctor.               Start taking these medicines.        Dose/Directions    metFORMIN 500 MG 24 hr tablet   Commonly known as:  GLUCOPHAGE-XR   Used for:  Type 2 diabetes mellitus without complication, without long-term current use of insulin (H)   Started by:  Ilene Tristan MD        Dose:  500 mg   Take 1 tablet (500 mg) by mouth daily (with dinner)   Quantity:  30 tablet   Refills:  1         These medicines have changed or have updated prescriptions.        Dose/Directions    acyclovir 5 % ointment   Commonly known as:  ZOVIRAX   This may have changed:  additional instructions   Used for:  Recurrent cold sores        " Apply topically 6 times daily   Quantity:  15 g   Refills:  3       fluticasone 50 MCG/ACT spray   Commonly known as:  FLONASE   This may have changed:    - when to take this  - reasons to take this   Used for:  Seasonal allergic rhinitis        Dose:  1-2 spray   Spray 1-2 sprays into both nostrils daily   Quantity:  16 g   Refills:  5       pantoprazole 20 MG EC tablet   Commonly known as:  PROTONIX   This may have changed:    - medication strength  - how much to take   Used for:  Gastroesophageal reflux disease without esophagitis   Changed by:  Ilene Tristan MD        Dose:  20-40 mg   Take 1-2 tablets (20-40 mg) by mouth daily   Quantity:  60 tablet   Refills:  1       PARoxetine 10 MG tablet   Commonly known as:  PAXIL   This may have changed:    - medication strength  - additional instructions   Used for:  Major depressive disorder, recurrent episode, moderate (H)   Changed by:  Ilene Tristan MD        TAKE 1 TABLET (10mg) BY MOUTH AT BEDTIME   Quantity:  30 tablet   Refills:  1            Where to get your medicines      These medications [id number] [place]  [address] [address]     Phone:  [phone]     metFORMIN 500 MG 24 hr tablet    pantoprazole 20 MG EC tablet    PARoxetine 10 MG tablet                Primary Care Provider Office Phone # Fax #    Ilene Tristan [phone]       [address] [address]        Thank you!     Thank you for choosing [facility]  for your care. Our goal is always to provide you with excellent care. Hearing back from our patients is one way we can continue to improve our services. Please take a few minutes to complete the written survey that you may receive in the mail after your visit with us. Thank you!             Your Updated Medication List - Protect others around you: Learn how  to safely use, store and throw away your medicines at www.disposemymeds.org.          This list is accurate as of: 3/8/17  2:52 PM.  Always use your most recent med list.                   Brand Name Dispense Instructions for use    acyclovir 400 MG tablet    ZOVIRAX     Take 400 mg by mouth See Admin Instructions 5 times daily as needed for outbreaks       acyclovir 5 % ointment    ZOVIRAX    15 g    Apply topically 6 times daily       albuterol 108 (90 BASE) MCG/ACT Inhaler    PROAIR HFA/PROVENTIL HFA/VENTOLIN HFA    1 Inhaler    Inhale 2 puffs into the lungs every 6 hours       azithromycin 250 MG tablet    ZITHROMAX    30 tablet    Take 1 tablet (250 mg) by mouth daily       calcium-vitamin D 600-400 MG-UNIT per tablet    CALTRATE    60 tablet    Take 1 tablet by mouth 2 times daily       COMPRESSION STOCKINGS     2 each    Wear compression stockings in affected leg (right leg) or both legs most of the time during the day and take them off at night.       diclofenac 1 % Gel topical gel    VOLTAREN    100 g    Apply 4 grams to knees or 2 grams to hands four times daily using enclosed dosing card.       fluticasone 220 MCG/ACT Inhaler    FLOVENT HFA    3 Inhaler    Inhale 2 puffs into the lungs 2 times daily       fluticasone 50 MCG/ACT spray    FLONASE    16 g    Spray 1-2 sprays into both nostrils daily       furosemide 40 MG tablet    LASIX    180 tablet    Take 1 tablet (40 mg) by mouth 2 times daily       HYDROcodone-acetaminophen 5-325 MG per tablet    NORCO    15 tablet    Take 1-2 tablets by mouth every 4 hours as needed for moderate to severe pain       ketoconazole 2 % cream    NIZORAL    60 g    Apply topically 2 times daily       metFORMIN 500 MG 24 hr tablet    GLUCOPHAGE-XR    30 tablet    Take 1 tablet (500 mg) by mouth daily (with dinner)       metoprolol 100 MG 24 hr tablet    TOPROL-XL    90 tablet    Take 1 tablet (100 mg) by mouth daily       montelukast 10 MG tablet    SINGULAIR    90 tablet     Take 1 tablet (10 mg) by mouth At Bedtime       order for DME     1 Device    Oxygen: Patient requires supplemental Oxygen 4 LPM via nasal canula with activity. Please provide patient with a home unit and with portability capability. Oxygen will be for a lifetime.       pantoprazole 20 MG EC tablet    PROTONIX    60 tablet    Take 1-2 tablets (20-40 mg) by mouth daily       PARoxetine 10 MG tablet    PAXIL    30 tablet    TAKE 1 TABLET (10mg) BY MOUTH AT BEDTIME       polyethylene glycol Packet    MIRALAX/GLYCOLAX    7 packet    Take 17 g by mouth daily as needed for constipation       predniSONE 10 MG tablet    DELTASONE    90 tablet    Take 1 tablet (10 mg) by mouth daily       spironolactone 25 MG tablet    ALDACTONE    90 tablet    Take 1 tablet (25 mg) by mouth daily       traZODone 50 MG tablet    DESYREL    60 tablet    TAKE 1/2-1 TABLET BY MOUTH NIGHTLY AS NEEDED, CAN TITRATE DOWN TO 1/2TAB OR UP TO 2TABS AS NEEDED       TYLENOL 500 MG tablet   Generic drug:  acetaminophen      Take 500 mg by mouth nightly as needed       warfarin 7.5 MG tablet    COUMADIN    100 tablet    Current dose is 7.5 mg daily.  Dose adjusted per INR result.

## 2017-03-08 NOTE — PATIENT INSTRUCTIONS
GERD- try decrease pantoprazole to 20mg/day, but start with alternating 40mg one day and 20 mg next day.  If no return of GERD sx's, go down to 20mg daily.    Anxiety- Paxil - will go down to 1/2 tab, 10mg dose until next visit.  If return of symptoms, or even if not, will look into another type of medication in that category (lexapro, celexa, zoloft).    Metformin- 500mg daily.  If too many GI sx's, go to a 1/2 tab (250mg) daily.    F/u in a month with a 45 min visit.  Try and see SIM Nicole, as soon as you can get back for formal diabetic counseling.

## 2017-03-08 NOTE — Clinical Note
Hi- wanted to send you an FYI for her... I think she really needs your personal expertise.   Let me know if you have any concerns. Thanks! Lev

## 2017-03-08 NOTE — PROGRESS NOTES
"  SUBJECTIVE:                                                    Betty Tee is a 76 year old female who presents to clinic today for the following health issues:  Pt states she is here to f/up on DM and discuss medication list.      ---DM- A1C up to 9.4 on 1/24/17, up from 7.0 at last check in 9/16.  Has been losing weight- states she is trying to lose weight.   Drinking a lot of liquids, mostly water.  Eating less in general.    Eating a lot of fruit- pears, oranges, bananas.  Will work on adding vegetables.  Doesn't tend to eat htem.      Cards-   1/12/17 plan- cont lasix 40mg 2x/d (lowered to this at 12/30/16 visit here), increased metoprolol due to high HR, and spironolactone increased to 25mg/d, partly due to low K (recheck K up from 3.3 to 3.5 2 wks later).  F/u in 5 months.    [place]- medicare pharmacist.  Paxil- possibly could contribute to dry mouth, ? Memory loss.  Step down gradually.  Pantoprazole- rx started in '11, when pt had diverticulitis abscess with complications given hospitalization for months.  Looks like dose was increased from 20mg/d to 40mg/d in ~1/16 due to pulmonary recs.    Routing refill request for Pantoprazole to provider for review/approval because:  Patient saw pulmonologist 1/8/2016, noted at visit: \"GERD: continues to have heart burn, advised patient to increase PPI from once per day to BID\"    Other issues- hair loss.  Has lost so much.  At derm yesterday with friend.        Patient Active Problem List   Diagnosis     Temporomandibular joint disorder     Diverticulitis of colon     Disorder of bone and cartilage     Osteoarthritis     Alcohol abuse, in remission     Restless legs syndrome (RLS)     Major depressive disorder, recurrent episode, moderate     Essential hypertension with goal blood pressure less than 140/90     CARDIOVASCULAR SCREENING; LDL GOAL LESS THAN 130     Sciatica     Advanced directives, counseling/discussion     Colouterine fistula     Atrial " fibrillation with controlled ventricular response (H)     Left ventricular hypertrophy     Health Care Home     Insomnia     Joint pains     Allergic reaction caused by a drug - likely plaquenil     Breast fibroadenoma     DVT (deep venous thrombosis) (H)     Fatty liver disease, nonalcoholic     Rib pain     Neck mass     Gastroesophageal reflux disease without esophagitis     Enlarged lymph node     Sjogren's syndrome (H)     ANGELICA (obstructive sleep apnea)     ILD (interstitial lung disease) (H)     Lower GI bleed     Acute and chronic respiratory failure with hypoxia (H)     Acute edema of lung (H)     (HFpEF) heart failure with preserved ejection fraction (H)     Type 2 diabetes mellitus without complication, without long-term current use of insulin (H)      Current Outpatient Prescriptions   Medication Sig Dispense Refill     pantoprazole (PROTONIX) 20 MG EC tablet Take 1-2 tablets (20-40 mg) by mouth daily 60 tablet 1     PARoxetine (PAXIL) 10 MG tablet TAKE 1 TABLET (10mg) BY MOUTH AT BEDTIME 30 tablet 1     metFORMIN (GLUCOPHAGE-XR) 500 MG 24 hr tablet Take 1 tablet (500 mg) by mouth daily (with dinner) 30 tablet 1     traZODone (DESYREL) 50 MG tablet TAKE 1/2-1 TABLET BY MOUTH NIGHTLY AS NEEDED, CAN TITRATE DOWN TO 1/2TAB OR UP TO 2TABS AS NEEDED 60 tablet 3     ketoconazole (NIZORAL) 2 % cream Apply topically 2 times daily 60 g 1     predniSONE (DELTASONE) 10 MG tablet Take 1 tablet (10 mg) by mouth daily 90 tablet 3     HYDROcodone-acetaminophen (NORCO) 5-325 MG per tablet Take 1-2 tablets by mouth every 4 hours as needed for moderate to severe pain 15 tablet 0     furosemide (LASIX) 40 MG tablet Take 1 tablet (40 mg) by mouth 2 times daily 180 tablet 3     metoprolol (TOPROL-XL) 100 MG 24 hr tablet Take 1 tablet (100 mg) by mouth daily 90 tablet 3     spironolactone (ALDACTONE) 25 MG tablet Take 1 tablet (25 mg) by mouth daily 90 tablet 3     montelukast (SINGULAIR) 10 MG tablet Take 1 tablet (10 mg) by  mouth At Bedtime 90 tablet 1     albuterol (PROAIR HFA, PROVENTIL HFA, VENTOLIN HFA) 108 (90 BASE) MCG/ACT inhaler Inhale 2 puffs into the lungs every 6 hours 1 Inhaler 3     calcium-vitamin D (CALTRATE) 600-400 MG-UNIT per tablet Take 1 tablet by mouth 2 times daily 60 tablet      order for DME Oxygen: Patient requires supplemental Oxygen 4 LPM via nasal canula with activity. Please provide patient with a home unit and with portability capability. Oxygen will be for a lifetime. 1 Device 0     azithromycin (ZITHROMAX) 250 MG tablet Take 1 tablet (250 mg) by mouth daily 30 tablet 11     diclofenac (VOLTAREN) 1 % GEL Apply 4 grams to knees or 2 grams to hands four times daily using enclosed dosing card. 100 g 1     polyethylene glycol (MIRALAX/GLYCOLAX) packet Take 17 g by mouth daily as needed for constipation 7 packet 0     warfarin (COUMADIN) 7.5 MG tablet Current dose is 7.5 mg daily.  Dose adjusted per INR result. 100 tablet 3     acyclovir (ZOVIRAX) 5 % ointment Apply topically 6 times daily (Patient taking differently: Apply topically 6 times daily As needed for outbreaks) 15 g 3     fluticasone (FLOVENT HFA) 220 MCG/ACT inhaler Inhale 2 puffs into the lungs 2 times daily 3 Inhaler 3     acyclovir (ZOVIRAX) 400 MG tablet Take 400 mg by mouth See Admin Instructions 5 times daily as needed for outbreaks       fluticasone (FLONASE) 50 MCG/ACT nasal spray Spray 1-2 sprays into both nostrils daily (Patient taking differently: Spray 1-2 sprays into both nostrils daily as needed for allergies ) 16 g 5     COMPRESSION STOCKINGS Wear compression stockings in affected leg (right leg) or both legs most of the time during the day and take them off at night. 2 each 2     acetaminophen (TYLENOL) 500 MG tablet Take 500 mg by mouth nightly as needed        Allergies   Allergen Reactions     Augmentin Nausea and Vomiting     Codeine Nausea and Vomiting     Phenobarbital Itching     Recent Labs   Lab Test  01/24/17   9891   "01/12/17   1316   11/03/16   1136  09/20/16   1045  09/02/16   1448   07/19/16   1254   09/03/15   1108   08/06/14   1116   11/14/11   0626   11/04/11   0724   05/03/11   0823   A1C  9.4*   --    --    --    --   7.0*   --    --    --    --    --    --    --    --    --   5.6   --    --    LDL   --    --    --    --   56   --    --    --    --    --    --   59   --    --    --    --    --   95   HDL   --    --    --    --   72   --    --    --    --    --    --   57   --    --    --    --    --   70   TRIG   --    --    --    --   123   --    --    --    --    --    --   129   --   72   < >   --    < >  73   ALT   --    --    --   21   --    --    --   51*   --   21   --   52*   < >  14   < >   --    < >   --    CR  0.81  0.87   < >  1.02   --   0.98   < >  0.84   < >   --    < >  0.82   < >  0.55   < >   --    < >  0.83   GFRESTIMATED  68  63   < >  53*   --   55*   < >  66   < >   --    < >  68   < >  >90   < >   --    < >  68   GFRESTBLACK  83  76   < >  64   --   67   < >  79   < >   --    < >  83   < >  >90   < >   --    < >  82   POTASSIUM  3.5  3.3*   < >  3.6   --   3.8   < >  4.1   < >   --    < >  4.1   < >  3.5   < >   --    < >  4.5    < > = values in this interval not displayed.      BP Readings from Last 3 Encounters:   03/08/17 130/70   02/17/17 127/72   01/12/17 116/64    Wt Readings from Last 3 Encounters:   03/08/17 206 lb 3.2 oz (93.5 kg)   01/12/17 218 lb (98.9 kg)   12/30/16 218 lb (98.9 kg)             Reviewed and updated as needed this visit by clinical staff  Tobacco  Allergies  Meds  Problems       Reviewed and updated as needed this visit by Provider  Allergies  Meds  Problems         ROS:  Constitutional, HEENT, cardiovascular, pulmonary, gi and gu systems are negative, except as otherwise noted.    OBJECTIVE:                                                    /70  Pulse 88  Temp 97.5  F (36.4  C) (Oral)  Resp 18  Ht 5' 6.75\" (1.695 m)  Wt 206 lb 3.2 oz (93.5 kg)  SpO2 96%  " Breastfeeding? No  BMI 32.54 kg/m2  Body mass index is 32.54 kg/(m^2).  GENERAL: healthy, alert and no distress  EYES: Eyes grossly normal to inspection, PERRL and conjunctivae and sclerae normal  HENT: ear canals and TM's normal, nose and mouth without ulcers or lesions  NECK: no adenopathy, no asymmetry, masses, or scars and thyroid normal to palpation  RESP: lungs clear to auscultation - no rales, rhonchi or wheezes  CV: irregularly irregular rhythm, no murmur, click or rub and peripheral pulses strong  ABDOMEN: soft, nontender, no hepatosplenomegaly, no masses and bowel sounds normal  NEURO: Normal strength and tone, mentation intact and speech normal       ASSESSMENT/PLAN:                                                        ICD-10-CM    1. Type 2 diabetes mellitus without complication, without long-term current use of insulin (H) E11.9 metFORMIN (GLUCOPHAGE-XR) 500 MG 24 hr tablet     MED THERAPY MANAGE REFERRAL   2. Gastroesophageal reflux disease without esophagitis K21.9 pantoprazole (PROTONIX) 20 MG EC tablet   3. Major depressive disorder, recurrent episode, moderate F33.1 PARoxetine (PAXIL) 10 MG tablet   4. Hair loss L65.9 DERMATOLOGY REFERRAL   5. Insomnia due to medical condition G47.01 traZODone (DESYREL) 50 MG tablet   6. Atrial fibrillation with controlled ventricular response (H) I48.91    7. (HFpEF) heart failure with preserved ejection fraction (H) I50.30      DM-II- Uncontrolled with high recent A1C.  DM is a newer dx in the past year, but with multiple serious medical issues, and A1C of 7.0 in 9/16, and lower GFR following GI bleed, elected not to start medication txt for DM.  Did meet with MTM to discuss DM briefly in 10/16- stated that she would 'benefit from diet/carb counting instruction, but that was not done then.  Rec f/u A1C in 12/16.  No A1C recheck until 1/24/17 - then up to 9.4, and this is her first visit back here since that lab draw.  With the worsening A1C, and improving GFR  "(now up to 68), will have her start low-dose metformin at 500mg/d.  If GI se's, take 1/2 tab.  Lipids- LDL 56 in 9/16- not on statin.  HTN- seeing cardiology- not on ACE/ARB.  F/u with MTM soon, for further DM discussion, medications review, and diet/carb discussion- will likely need a few visits.  F/u with MD in 1 month.      GERD - medicare pharmacist discussed medication issues with pt- discussed PPI medications.  Reviewed chart- pantoprazole rx started in '11, when pt had diverticulitis abscess with complications given hospitalization for months.  Looks like dose was increased from 20mg/d to 40mg/d in ~1/16 due to pulmonary recs.  Routing refill request for Pantoprazole to provider for review/approval because:  Patient saw pulmonologist 1/8/2016, noted at visit: \"GERD: continues to have heart burn, advised patient to increase PPI from once per day to BID\"  Reviewed medication potential se's and previous recommendations with pt today- discussed risks/benefits/se's.  Will have her try and decrease her pantoprazole first from 40mg/d to 20mg/d, but start with alternating doses.   If no return of GERD sx's, go down to 20mg/d.  Will discuss further at next visit to see if further reduction is feasible.    MDD/Anxiety- medicare pharmacist also discussed paxil rx with pt.    Pt has been on this medication with good control of her anxiety since before she was coming to this provider.  She is currently taking 20mg/d.  Will try going down to 10mg/d until her next visit in a month.  If return of sx's, will look into another SSRI option.    Hair loss- briefly discussed today- rec f/u with derm- referral given.    Insomnia- trazodone has worked well for pt since ~'11.  No se's, will continue.  Refills sent.    A.Fib/HFpEF-  Reviewed 1/17 cardiology consult note...  1/12/17 plan- cont lasix 40mg 2x/d (lowered to this at 12/30/16 visit here), increased metoprolol due to high HR, and spironolactone increased to 25mg/d, partly " due to low K (recheck K up from 3.3 to 3.5 2 wks later).  F/u in 5 months.    F/u in a month with a 45 minute visit.  F/u to see SIM Nicole, as soon as possible for formal DM counseling.      Ilene Tristan MD  [facility]          Patient Instructions   GERD- try decrease pantoprazole to 20mg/day, but start with alternating 40mg one day and 20 mg next day.  If no return of GERD sx's, go down to 20mg daily.    Anxiety- Paxil - will go down to 1/2 tab, 10mg dose until next visit.  If return of symptoms, or even if not, will look into another type of medication in that category (lexapro, celexa, zoloft).    Metformin- 500mg daily.  If too many GI sx's, go to a 1/2 tab (250mg) daily.    F/u in a month with a 45 min visit.  Try and see SIM Nicole, as soon as you can get back for formal diabetic counseling.

## 2017-03-09 ENCOUNTER — TELEPHONE (OUTPATIENT)
Dept: PHARMACY | Facility: OTHER | Age: 77
End: 2017-03-09

## 2017-03-09 NOTE — TELEPHONE ENCOUNTER
Tried calling again- general line, unable to get directly to him, had to listen to recorded msgs for ~10 minutes, no available agents, then told to leave msg, then told I was unable to leave msg, call back later.    Ridiculous.  If he wants to chat, tell him to give a direct number.    Did see pt yesterday, discussed these two medications with her.  They have been extremely low priority given her other health issues in the past couple yrs, but she is stable enough now to talk a bit about them.  DM care is still MUCH more of a priority, as is her breathing difficulties.    Pt has tried going off paxil twice in past, will try lower dose, and consider switching, but she's done well on this medication for many, many yrs.  Pantoprazole- started by pulmonary in '11, and dose increased due to increased sx's a couple yrs ago.  Will see if she can go down on dose, and she is trying to lose wt due to DM dx, which should help.    Will close encounter, but if he calls back, tell him I will not call back without a direct number.  CW

## 2017-03-22 ENCOUNTER — OFFICE VISIT (OUTPATIENT)
Dept: PULMONOLOGY | Facility: CLINIC | Age: 77
End: 2017-03-22
Attending: INTERNAL MEDICINE
Payer: MEDICARE

## 2017-03-22 VITALS — WEIGHT: 206.2 LBS | BODY MASS INDEX: 32.36 KG/M2 | HEIGHT: 67 IN

## 2017-03-22 DIAGNOSIS — J84.9 ILD (INTERSTITIAL LUNG DISEASE) (H): Primary | ICD-10-CM

## 2017-03-22 DIAGNOSIS — J84.9 ILD (INTERSTITIAL LUNG DISEASE) (H): ICD-10-CM

## 2017-03-22 DIAGNOSIS — J44.89 FOLLICULAR BRONCHIOLITIS (H): ICD-10-CM

## 2017-03-22 DIAGNOSIS — R09.02 HYPOXIA: ICD-10-CM

## 2017-03-22 PROCEDURE — 99212 OFFICE O/P EST SF 10 MIN: CPT | Mod: ZF

## 2017-03-22 NOTE — NURSING NOTE
Chief Complaint   Patient presents with     Breathing Problem     Patient is bieng seen for follow up of breathing issues      Jasmina Mason CMA at 3:23 PM on 3/22/2017

## 2017-03-22 NOTE — PROGRESS NOTES
[facility]   Pulmonary Clinic Follow Up Note  March 22, 2017      Betty Tee MRN# [id number]   Age: 76 year old YOB: 1940     Date of Consultation: March 22, 2017    Primary care provider: Ilene Tristan     History taken from; Patient       Betty Tee is a 76 year old female seen in the Pulmonary Clinic  for .          Pulmonary Problem List / Reason for follow up:   1. ILD  2. KAMILAH  3. Lung nodules           Assessment and Plan:     ASSESSMENT AND PLAN:  The patient is a 76-year-old lady with a history of bronchiolitis obliterans, interstitial lung disease and lung nodules, coming for the followup visit.  The patient is feeling the same.   1.  Bronchiolitis obliterans.  We will continue with Flovent and albuterol.  The patient does not require albuterol daily so currently we will continue with this regimen.  The patient will be referred to pulmonary rehab.  The patient is complaining about swallowing difficulties and the patient will need speech therapy consult with possible swallow evaluation.   2.  Lung nodules.  The patient had a CT of the chest today and will await official results of the CT of the chest and will follow up on the lung nodules.   3.  Interstitial lung disease.  The patient has very mild interstitial lung disease.  At this time, we will continue to observe the patient.  The patient is currently on 10 mg of prednisone and doing well.      We explained the plan to the patient including the risks and benefits.  The patient expressed understanding and agreed with the plan.      Thank you very much for the opportunity to participate in the care of this very pleasant patient.           Return visit in 3 months      Meño Walsh M.D.         History of Present Illness / Interval History:     HISTORY OF PRESENT ILLNESS:  The patient has a history of Sjogren's associated interstitial lung disease and bronchiolitis obliterans.   The patient is also found to have lung nodules.  The patient's pulmonary function tests are currently stable and the patient is feeling that her symptoms are stable.  The patient had a CT of the chest today.                 Pulmonary Data:         Exposure history: Asbestos;  No , TB;  No , Radiation;   No          Medications:     Current Outpatient Prescriptions   Medication Sig     pantoprazole (PROTONIX) 20 MG EC tablet Take 1-2 tablets (20-40 mg) by mouth daily     PARoxetine (PAXIL) 10 MG tablet TAKE 1 TABLET (10mg) BY MOUTH AT BEDTIME     metFORMIN (GLUCOPHAGE-XR) 500 MG 24 hr tablet Take 1 tablet (500 mg) by mouth daily (with dinner)     traZODone (DESYREL) 50 MG tablet TAKE 1/2-1 TABLET BY MOUTH NIGHTLY AS NEEDED, CAN TITRATE DOWN TO 1/2TAB OR UP TO 2TABS AS NEEDED     ketoconazole (NIZORAL) 2 % cream Apply topically 2 times daily     predniSONE (DELTASONE) 10 MG tablet Take 1 tablet (10 mg) by mouth daily     HYDROcodone-acetaminophen (NORCO) 5-325 MG per tablet Take 1-2 tablets by mouth every 4 hours as needed for moderate to severe pain     furosemide (LASIX) 40 MG tablet Take 1 tablet (40 mg) by mouth 2 times daily     metoprolol (TOPROL-XL) 100 MG 24 hr tablet Take 1 tablet (100 mg) by mouth daily     spironolactone (ALDACTONE) 25 MG tablet Take 1 tablet (25 mg) by mouth daily     montelukast (SINGULAIR) 10 MG tablet Take 1 tablet (10 mg) by mouth At Bedtime     albuterol (PROAIR HFA, PROVENTIL HFA, VENTOLIN HFA) 108 (90 BASE) MCG/ACT inhaler Inhale 2 puffs into the lungs every 6 hours     calcium-vitamin D (CALTRATE) 600-400 MG-UNIT per tablet Take 1 tablet by mouth 2 times daily     order for DME Oxygen: Patient requires supplemental Oxygen 4 LPM via nasal canula with activity. Please provide patient with a home unit and with portability capability. Oxygen will be for a lifetime.     azithromycin (ZITHROMAX) 250 MG tablet Take 1 tablet (250 mg) by mouth daily     diclofenac (VOLTAREN) 1 % GEL Apply 4  grams to knees or 2 grams to hands four times daily using enclosed dosing card.     polyethylene glycol (MIRALAX/GLYCOLAX) packet Take 17 g by mouth daily as needed for constipation     warfarin (COUMADIN) 7.5 MG tablet Current dose is 7.5 mg daily.  Dose adjusted per INR result.     acyclovir (ZOVIRAX) 5 % ointment Apply topically 6 times daily (Patient taking differently: Apply topically 6 times daily As needed for outbreaks)     fluticasone (FLOVENT HFA) 220 MCG/ACT inhaler Inhale 2 puffs into the lungs 2 times daily     acyclovir (ZOVIRAX) 400 MG tablet Take 400 mg by mouth See Admin Instructions 5 times daily as needed for outbreaks     fluticasone (FLONASE) 50 MCG/ACT nasal spray Spray 1-2 sprays into both nostrils daily (Patient taking differently: Spray 1-2 sprays into both nostrils daily as needed for allergies )     COMPRESSION STOCKINGS Wear compression stockings in affected leg (right leg) or both legs most of the time during the day and take them off at night.     acetaminophen (TYLENOL) 500 MG tablet Take 500 mg by mouth nightly as needed      No current facility-administered medications for this visit.              Past Medical History:     Past Medical History:   Diagnosis Date     Alcohol abuse, in remission      Allergic rhinitis, cause unspecified     allegra helps when she takes it     Antiplatelet or antithrombotic long-term use      Atrial fibrillation (H)     in hosp in 11/11 after surgery w/ fluid overload     Cardiomegaly     LVH on stress echo- cardiac w/u at [facility] ER- neg CT scan for PE, neg stress echo in 8/06     Chest pain, unspecified      Disorder of bone and cartilage, unspecified     osteopenia (had been on prempro), improved on 6/06 dexa, stable dexa 11/10     Diverticulosis of colon (without mention of hemorrhage)     last episode yrs ago     Essential hypertension, benign      Gastro-oesophageal reflux disease      Insomnia, unspecified     weaned off clonazepam     Irregular  heart beat      Lumbago 7/09    MRI with NEAL, now seeing Dr. Cain for sciatic sx's     Major depressive disorder, recurrent episode, moderate (H)      Obstructive sleep apnea      Osteoarthrosis, unspecified whether generalized or localized, unspecified site      Sjogren's syndrome (H)      Sleep apnea      Tobacco use disorder     chantix in 9/07, started again in 6/08, working             Past Surgical History:     Past Surgical History:   Procedure Laterality Date     BACK SURGERY  1962     BIOPSY BREAST  9/27/02    Biopsy Left Breast     C APPENDECTOMY  1970's?     C NONSPECIFIC PROCEDURE  11/05    exploratory abd lap, adhesions, resolved RLQ pain, diverticulitis episodes     CARDIAC SURGERY       CHOLECYSTECTOMY  1990's?     COLECTOMY LEFT  11/7/2011    Procedure:COLECTOMY LEFT; Laparoscopic mobilization of splenic flexture, sigmoid colectomy, coloprotoscopy, loop illeostomy; Surgeon:CK CASTANEDA; Location:[facility]     HYSTERECTOMY TOTAL ABDOMINAL, BILATERAL SALPINGO-OOPHORECTOMY, COMBINED  11/7/2011    Procedure:COMBINED HYSTERECTOMY TOTAL ABDOMINAL, BILATERAL SALPINGO-OOPHORECTOMY; total abdominal hysterectomy, bilateral salpingo-oophorectomy; Surgeon:ALETA MANUEL; Location:[facility] OR     INSERT STENT URETER  11/7/2011    Procedure:INSERT STENT URETER; Placement of Bilateral Ureteral Stents ; Surgeon:PRANEETH BRYANT; Location:[facility]     SIGMOIDOSCOPY FLEXIBLE  11/3/2011    Procedure:SIGMOIDOSCOPY FLEXIBLE; Flexible Sigmoidoscopy; Surgeon:CK CASTANEDA; Location:[facility]     TAKEDOWN ILEOSTOMY  2/1/2012    Procedure:TAKEDOWN ILEOSTOMY; Takedown Loop Ileostomy ; Surgeon:CK CASTANEDA; Location:[facility] OR             Social History:     Social History     Social History     Marital status: Single     Spouse name: N/A     Number of children: 0     Years of education: Ed Spec De     Occupational History     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     Social History Main  Topics     Smoking status: Former Smoker     Packs/day: 0.50     Years: 10.00     Types: Cigarettes     Quit date: 8/1/2011     Smokeless tobacco: Never Used      Comment: 1/2 ppd     Alcohol use No      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     Drug use: No     Sexual activity: No     Other Topics Concern     Not on file     Social History Narrative    Social Documentation:        Balanced Diet: YES    Calcium intake: 1-2 per day    Caffeine: 4-5 cups per day    Exercise:  type of activity limited right now due to foot injury    Sunscreen: No    Seatbelts:  Yes    Self Breast Exam:  Yes    Self Testicular Exam: n/a    Physical/Emotional/Sexual Abuse: No    Do you feel safe in your environment? No-pt lives in [facility]        Cholesterol screen up to date: No-will check today    Eye Exam up to date: Yes    Dental Exam up to date: Yes    Pap smear up to date: Does Not Apply    Mammogram up to date: Yes-10/07    Dexa Scan up to date: Yes-2006    Colonoscopy up to date: Yes-2006    Immunizations up to date: Yes-td 2002    Glucose screen if over 40:  Yes    '09                             Family History:     Family History   Problem Relation Age of Onset     C.A.D. Mother 63     MI- first at age 63     C.A.D. Sister 52     Minor MI- age 50's     HEART DISEASE Mother      HEART DISEASE Sister      Hypertension Mother      Hypertension Sister      Hypertension Sister      Hypertension Brother      CEREBROVASCULAR DISEASE Mother      Cancer - colorectal Sister 48     Late 40's early 50's     Prostate Cancer Brother 74     Dx'd age 74     Alcohol/Drug Father      Alzheimer Disease Father      GASTROINTESTINAL DISEASE Sister      Diverticulitis     GASTROINTESTINAL DISEASE Brother      Diverticulitis     Lipids Sister      Lipids Sister      Parkinsonism Brother      DIABETES Sister      HEART DISEASE Sister      CHF     CANCER Sister      lung, smoker     Substance Abuse Sister      Substance Abuse Brother      Dementia  Father      Asthma Sister      CANCER Sister      Substance Abuse Sister      Substance Abuse Brother      Asthma Sister      Hypertension Father      Breast Cancer Daughter      Prostate Cancer Brother      Hyperlipidemia Mother      Hyperlipidemia Father      Hyperlipidemia Brother              Immunization:     Immunization History   Administered Date(s) Administered     Influenza (High Dose) 3 valent vaccine 10/11/2013, 10/14/2014, 12/31/2015, 11/11/2016     Influenza (IIV3) 11/24/2003, 10/08/2004, 10/28/2005, 11/01/2006, 09/01/2010, 10/17/2011, 09/21/2012     Pneumococcal (PCV 13) 10/30/2015     Pneumococcal 23 valent 11/03/2010     TD (ADULT, 7+) 01/15/1993, 11/05/2002     TDAP (ADACEL AGES 11-64) 11/03/2010     Zoster vaccine, live 10/05/2009              Review of Systems:   I have done 10 points of review systems and pertinent findings are as above, otherwise negative.             Physical Examination:   General: Alert, oriented, not in distress  B/P: Data Unavailable, T: Data Unavailable, P: Data Unavailable, R: Data Unavailable  HEENT: neck supple, symmetrical, no lymph node enlargement, thyromegaly, bruit, JVD, pupils are isocoric and equally responsive to the light.   Lungs: both hemithoraces are symmetrical, normal to palpation, no dullness to percussion, auscultation of lungs revealed normal breathing sounds.  CVS: Normal S1 and S2, no additional heart sounds, murmur, rub, normal peripheral pulses  Abdomen: Bowel sounds present, soft, non tender, non distended, no organomegaly, ascitis, mass  Extremities/musculoskeletal: no peripheral edema, deformity, cyanosis, clubbing  Neurology: alert, orientedx3, no motor/sensorial deficits, cranial nerves grossly normal  Skin: no rash  Psychiatry: Mood and affect are appropriate             DATA:     CBC RESULTS:     Recent Labs   Lab Test  01/24/17   1338  09/20/16   1045   WBC  12.4*  10.1   RBC  4.26  4.36   HGB  9.8*  10.4*   HCT  33.5*  35.5   PLT  306  336        Basic Metabolic Panel:  Recent Labs   Lab Test  01/24/17   1338  01/12/17   1316   NA  138  139   POTASSIUM  3.5  3.3*   CHLORIDE  99  101   CO2  29  27   BUN  15  12   CR  0.81  0.87   GLC  351*  278*   CLAUDY  8.7  8.8       INR  Recent Labs   Lab Test  02/17/17   1836 01/24/17   INR  2.50*  2.7*        PFT   PFT Latest Ref Rng & Units 3/22/2017   FVC L 1.62   FEV1 L 1.29   FVC% % 58   FEV1% % 60             Thank you for allowing me participate in the care of Betty Tee.    Meño Walsh M.D.  Associate Professor of Medicine  Pulmonary, Allergy, Critical Care and Sleep

## 2017-03-22 NOTE — LETTER
3/22/2017      RE: Betty Tee  [address]  [facility] [address]                [facility]   Pulmonary Clinic Follow Up Note  March 22, 2017      Betty Tee MRN# [id number]   Age: 76 year old YOB: 1940     Date of Consultation: March 22, 2017    Primary care provider: Ilene Tristan     History taken from; Patient       Betty Tee is a 76 year old female seen in the Pulmonary Clinic  for .          Pulmonary Problem List / Reason for follow up:   1. ILD  2. KAMILAH  3. Lung nodules           Assessment and Plan:     ASSESSMENT AND PLAN:  The patient is a 76-year-old lady with a history of bronchiolitis obliterans, interstitial lung disease and lung nodules, coming for the followup visit.  The patient is feeling the same.   1.  Bronchiolitis obliterans.  We will continue with Flovent and albuterol.  The patient does not require albuterol daily so currently we will continue with this regimen.  The patient will be referred to pulmonary rehab.  The patient is complaining about swallowing difficulties and the patient will need speech therapy consult with possible swallow evaluation.   2.  Lung nodules.  The patient had a CT of the chest today and will await official results of the CT of the chest and will follow up on the lung nodules.   3.  Interstitial lung disease.  The patient has very mild interstitial lung disease.  At this time, we will continue to observe the patient.  The patient is currently on 10 mg of prednisone and doing well.      We explained the plan to the patient including the risks and benefits.  The patient expressed understanding and agreed with the plan.      Thank you very much for the opportunity to participate in the care of this very pleasant patient.           Return visit in 3 months      Meño Walsh M.D.         History of Present Illness / Interval History:     HISTORY OF PRESENT ILLNESS:  The patient has a  history of Sjogren's associated interstitial lung disease and bronchiolitis obliterans.  The patient is also found to have lung nodules.  The patient's pulmonary function tests are currently stable and the patient is feeling that her symptoms are stable.  The patient had a CT of the chest today.                 Pulmonary Data:         Exposure history: Asbestos;  No , TB;  No , Radiation;   No          Medications:     Current Outpatient Prescriptions   Medication Sig     pantoprazole (PROTONIX) 20 MG EC tablet Take 1-2 tablets (20-40 mg) by mouth daily     PARoxetine (PAXIL) 10 MG tablet TAKE 1 TABLET (10mg) BY MOUTH AT BEDTIME     metFORMIN (GLUCOPHAGE-XR) 500 MG 24 hr tablet Take 1 tablet (500 mg) by mouth daily (with dinner)     traZODone (DESYREL) 50 MG tablet TAKE 1/2-1 TABLET BY MOUTH NIGHTLY AS NEEDED, CAN TITRATE DOWN TO 1/2TAB OR UP TO 2TABS AS NEEDED     ketoconazole (NIZORAL) 2 % cream Apply topically 2 times daily     predniSONE (DELTASONE) 10 MG tablet Take 1 tablet (10 mg) by mouth daily     HYDROcodone-acetaminophen (NORCO) 5-325 MG per tablet Take 1-2 tablets by mouth every 4 hours as needed for moderate to severe pain     furosemide (LASIX) 40 MG tablet Take 1 tablet (40 mg) by mouth 2 times daily     metoprolol (TOPROL-XL) 100 MG 24 hr tablet Take 1 tablet (100 mg) by mouth daily     spironolactone (ALDACTONE) 25 MG tablet Take 1 tablet (25 mg) by mouth daily     montelukast (SINGULAIR) 10 MG tablet Take 1 tablet (10 mg) by mouth At Bedtime     albuterol (PROAIR HFA, PROVENTIL HFA, VENTOLIN HFA) 108 (90 BASE) MCG/ACT inhaler Inhale 2 puffs into the lungs every 6 hours     calcium-vitamin D (CALTRATE) 600-400 MG-UNIT per tablet Take 1 tablet by mouth 2 times daily     order for DME Oxygen: Patient requires supplemental Oxygen 4 LPM via nasal canula with activity. Please provide patient with a home unit and with portability capability. Oxygen will be for a lifetime.     azithromycin (ZITHROMAX) 250  MG tablet Take 1 tablet (250 mg) by mouth daily     diclofenac (VOLTAREN) 1 % GEL Apply 4 grams to knees or 2 grams to hands four times daily using enclosed dosing card.     polyethylene glycol (MIRALAX/GLYCOLAX) packet Take 17 g by mouth daily as needed for constipation     warfarin (COUMADIN) 7.5 MG tablet Current dose is 7.5 mg daily.  Dose adjusted per INR result.     acyclovir (ZOVIRAX) 5 % ointment Apply topically 6 times daily (Patient taking differently: Apply topically 6 times daily As needed for outbreaks)     fluticasone (FLOVENT HFA) 220 MCG/ACT inhaler Inhale 2 puffs into the lungs 2 times daily     acyclovir (ZOVIRAX) 400 MG tablet Take 400 mg by mouth See Admin Instructions 5 times daily as needed for outbreaks     fluticasone (FLONASE) 50 MCG/ACT nasal spray Spray 1-2 sprays into both nostrils daily (Patient taking differently: Spray 1-2 sprays into both nostrils daily as needed for allergies )     COMPRESSION STOCKINGS Wear compression stockings in affected leg (right leg) or both legs most of the time during the day and take them off at night.     acetaminophen (TYLENOL) 500 MG tablet Take 500 mg by mouth nightly as needed      No current facility-administered medications for this visit.              Past Medical History:     Past Medical History:   Diagnosis Date     Alcohol abuse, in remission      Allergic rhinitis, cause unspecified     allegra helps when she takes it     Antiplatelet or antithrombotic long-term use      Atrial fibrillation (H)     in hosp in 11/11 after surgery w/ fluid overload     Cardiomegaly     LVH on stress echo- cardiac w/u at [facility] ER- neg CT scan for PE, neg stress echo in 8/06     Chest pain, unspecified      Disorder of bone and cartilage, unspecified     osteopenia (had been on prempro), improved on 6/06 dexa, stable dexa 11/10     Diverticulosis of colon (without mention of hemorrhage)     last episode yrs ago     Essential hypertension, benign       Gastro-oesophageal reflux disease      Insomnia, unspecified     weaned off clonazepam     Irregular heart beat      Lumbago 7/09    MRI with DJD, now seeing Dr. Cain for sciatic sx's     Major depressive disorder, recurrent episode, moderate (H)      Obstructive sleep apnea      Osteoarthrosis, unspecified whether generalized or localized, unspecified site      Sjogren's syndrome (H)      Sleep apnea      Tobacco use disorder     chantix in 9/07, started again in 6/08, working             Past Surgical History:     Past Surgical History:   Procedure Laterality Date     BACK SURGERY  1962     BIOPSY BREAST  9/27/02    Biopsy Left Breast     C APPENDECTOMY  1970's?     C NONSPECIFIC PROCEDURE  11/05    exploratory abd lap, adhesions, resolved RLQ pain, diverticulitis episodes     CARDIAC SURGERY       CHOLECYSTECTOMY  1990's?     COLECTOMY LEFT  11/7/2011    Procedure:COLECTOMY LEFT; Laparoscopic mobilization of splenic flexture, sigmoid colectomy, coloprotoscopy, loop illeostomy; Surgeon:CK CASTANEDA; Location:[facility]     HYSTERECTOMY TOTAL ABDOMINAL, BILATERAL SALPINGO-OOPHORECTOMY, COMBINED  11/7/2011    Procedure:COMBINED HYSTERECTOMY TOTAL ABDOMINAL, BILATERAL SALPINGO-OOPHORECTOMY; total abdominal hysterectomy, bilateral salpingo-oophorectomy; Surgeon:ALETA MANUEL; Location:[facility]     INSERT STENT URETER  11/7/2011    Procedure:INSERT STENT URETER; Placement of Bilateral Ureteral Stents ; Surgeon:PRAENETH BRYANT; Location:[facility]     SIGMOIDOSCOPY FLEXIBLE  11/3/2011    Procedure:SIGMOIDOSCOPY FLEXIBLE; Flexible Sigmoidoscopy; Surgeon:CK CASTANEDA; Location:[facility]     TAKEDOWN ILEOSTOMY  2/1/2012    Procedure:TAKEDOWN ILEOSTOMY; Takedown Loop Ileostomy ; Surgeon:CK CASTANEDA; Location:[facility]             Social History:     Social History     Social History     Marital status: Single     Spouse name: N/A     Number of children: 0     Years of education: Ed Spec De     Occupational History      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     Social History Main Topics     Smoking status: Former Smoker     Packs/day: 0.50     Years: 10.00     Types: Cigarettes     Quit date: 8/1/2011     Smokeless tobacco: Never Used      Comment: 1/2 ppd     Alcohol use No      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     Drug use: No     Sexual activity: No     Other Topics Concern     Not on file     Social History Narrative    Social Documentation:        Balanced Diet: YES    Calcium intake: 1-2 per day    Caffeine: 4-5 cups per day    Exercise:  type of activity limited right now due to foot injury    Sunscreen: No    Seatbelts:  Yes    Self Breast Exam:  Yes    Self Testicular Exam: n/a    Physical/Emotional/Sexual Abuse: No    Do you feel safe in your environment? No-pt lives in [facility]        Cholesterol screen up to date: No-will check today    Eye Exam up to date: Yes    Dental Exam up to date: Yes    Pap smear up to date: Does Not Apply    Mammogram up to date: Yes-10/07    Dexa Scan up to date: Yes-2006    Colonoscopy up to date: Yes-2006    Immunizations up to date: Yes-td 2002    Glucose screen if over 40:  Yes    '09                             Family History:     Family History   Problem Relation Age of Onset     C.A.D. Mother 63     MI- first at age 63     C.A.D. Sister 52     Minor MI- age 50's     HEART DISEASE Mother      HEART DISEASE Sister      Hypertension Mother      Hypertension Sister      Hypertension Sister      Hypertension Brother      CEREBROVASCULAR DISEASE Mother      Cancer - colorectal Sister 48     Late 40's early 50's     Prostate Cancer Brother 74     Dx'd age 74     Alcohol/Drug Father      Alzheimer Disease Father      GASTROINTESTINAL DISEASE Sister      Diverticulitis     GASTROINTESTINAL DISEASE Brother      Diverticulitis     Lipids Sister      Lipids Sister      Parkinsonism Brother      DIABETES Sister      HEART DISEASE Sister      CHF      CANCER Sister      lung, smoker     Substance Abuse Sister      Substance Abuse Brother      Dementia Father      Asthma Sister      CANCER Sister      Substance Abuse Sister      Substance Abuse Brother      Asthma Sister      Hypertension Father      Breast Cancer Daughter      Prostate Cancer Brother      Hyperlipidemia Mother      Hyperlipidemia Father      Hyperlipidemia Brother              Immunization:     Immunization History   Administered Date(s) Administered     Influenza (High Dose) 3 valent vaccine 10/11/2013, 10/14/2014, 12/31/2015, 11/11/2016     Influenza (IIV3) 11/24/2003, 10/08/2004, 10/28/2005, 11/01/2006, 09/01/2010, 10/17/2011, 09/21/2012     Pneumococcal (PCV 13) 10/30/2015     Pneumococcal 23 valent 11/03/2010     TD (ADULT, 7+) 01/15/1993, 11/05/2002     TDAP (ADACEL AGES 11-64) 11/03/2010     Zoster vaccine, live 10/05/2009              Review of Systems:   I have done 10 points of review systems and pertinent findings are as above, otherwise negative.             Physical Examination:   General: Alert, oriented, not in distress  B/P: Data Unavailable, T: Data Unavailable, P: Data Unavailable, R: Data Unavailable  HEENT: neck supple, symmetrical, no lymph node enlargement, thyromegaly, bruit, JVD, pupils are isocoric and equally responsive to the light.   Lungs: both hemithoraces are symmetrical, normal to palpation, no dullness to percussion, auscultation of lungs revealed normal breathing sounds.  CVS: Normal S1 and S2, no additional heart sounds, murmur, rub, normal peripheral pulses  Abdomen: Bowel sounds present, soft, non tender, non distended, no organomegaly, ascitis, mass  Extremities/musculoskeletal: no peripheral edema, deformity, cyanosis, clubbing  Neurology: alert, orientedx3, no motor/sensorial deficits, cranial nerves grossly normal  Skin: no rash  Psychiatry: Mood and affect are appropriate             DATA:     CBC RESULTS:     Recent Labs   Lab Test  01/24/17   1338   09/20/16   1045   WBC  12.4*  10.1   RBC  4.26  4.36   HGB  9.8*  10.4*   HCT  33.5*  35.5   PLT  306  336       Basic Metabolic Panel:  Recent Labs   Lab Test  01/24/17   1338  01/12/17   1316   NA  138  139   POTASSIUM  3.5  3.3*   CHLORIDE  99  101   CO2  29  27   BUN  15  12   CR  0.81  0.87   GLC  351*  278*   CLAUDY  8.7  8.8       INR  Recent Labs   Lab Test  02/17/17   1836 01/24/17   INR  2.50*  2.7*        PFT   PFT Latest Ref Rng & Units 3/22/2017   FVC L 1.62   FEV1 L 1.29   FVC% % 58   FEV1% % 60             Thank you for allowing me participate in the care of Betty GRISELDA Watkinsey.    Meño Walsh M.D.  Associate Professor of Medicine  Pulmonary, Allergy, Critical Care and Sleep      Meño Walsh MD

## 2017-03-22 NOTE — LETTER
3/22/2017     RE: Betty Tee  [address]  [facility] [address]     Dear Colleague,    Thank you for referring your patient, Betty Tee, to the [facility] FOR LUNG SCIENCE AND HE[facility] at [facility]. Please see a copy of my visit note below.             [facility]   Pulmonary Clinic Follow Up Note  March 22, 2017      Betty Tee MRN# [id number]   Age: 76 year old YOB: 1940     Date of Consultation: March 22, 2017    Primary care provider: Ilene Tristan     History taken from; Patient       Betty Tee is a 76 year old female seen in the Pulmonary Clinic  for .          Pulmonary Problem List / Reason for follow up:   1. ILD  2. KAMILAH  3. Lung nodules           Assessment and Plan:     ASSESSMENT AND PLAN:  The patient is a 76-year-old lady with a history of bronchiolitis obliterans, interstitial lung disease and lung nodules, coming for the followup visit.  The patient is feeling the same.   1.  Bronchiolitis obliterans.  We will continue with Flovent and albuterol.  The patient does not require albuterol daily so currently we will continue with this regimen.  The patient will be referred to pulmonary rehab.  The patient is complaining about swallowing difficulties and the patient will need speech therapy consult with possible swallow evaluation.   2.  Lung nodules.  The patient had a CT of the chest today and will await official results of the CT of the chest and will follow up on the lung nodules.   3.  Interstitial lung disease.  The patient has very mild interstitial lung disease.  At this time, we will continue to observe the patient.  The patient is currently on 10 mg of prednisone and doing well.      We explained the plan to the patient including the risks and benefits.  The patient expressed understanding and agreed with the plan.      Thank you very much for the opportunity  to participate in the care of this very pleasant patient.           Return visit in 3 months      Meño Walsh M.D.         History of Present Illness / Interval History:     HISTORY OF PRESENT ILLNESS:  The patient has a history of Sjogren's associated interstitial lung disease and bronchiolitis obliterans.  The patient is also found to have lung nodules.  The patient's pulmonary function tests are currently stable and the patient is feeling that her symptoms are stable.  The patient had a CT of the chest today.                 Pulmonary Data:         Exposure history: Asbestos;  No , TB;  No , Radiation;   No          Medications:     Current Outpatient Prescriptions   Medication Sig     pantoprazole (PROTONIX) 20 MG EC tablet Take 1-2 tablets (20-40 mg) by mouth daily     PARoxetine (PAXIL) 10 MG tablet TAKE 1 TABLET (10mg) BY MOUTH AT BEDTIME     metFORMIN (GLUCOPHAGE-XR) 500 MG 24 hr tablet Take 1 tablet (500 mg) by mouth daily (with dinner)     traZODone (DESYREL) 50 MG tablet TAKE 1/2-1 TABLET BY MOUTH NIGHTLY AS NEEDED, CAN TITRATE DOWN TO 1/2TAB OR UP TO 2TABS AS NEEDED     ketoconazole (NIZORAL) 2 % cream Apply topically 2 times daily     predniSONE (DELTASONE) 10 MG tablet Take 1 tablet (10 mg) by mouth daily     HYDROcodone-acetaminophen (NORCO) 5-325 MG per tablet Take 1-2 tablets by mouth every 4 hours as needed for moderate to severe pain     furosemide (LASIX) 40 MG tablet Take 1 tablet (40 mg) by mouth 2 times daily     metoprolol (TOPROL-XL) 100 MG 24 hr tablet Take 1 tablet (100 mg) by mouth daily     spironolactone (ALDACTONE) 25 MG tablet Take 1 tablet (25 mg) by mouth daily     montelukast (SINGULAIR) 10 MG tablet Take 1 tablet (10 mg) by mouth At Bedtime     albuterol (PROAIR HFA, PROVENTIL HFA, VENTOLIN HFA) 108 (90 BASE) MCG/ACT inhaler Inhale 2 puffs into the lungs every 6 hours     calcium-vitamin D (CALTRATE) 600-400 MG-UNIT per tablet Take 1 tablet by mouth 2 times daily     order for DME  Oxygen: Patient requires supplemental Oxygen 4 LPM via nasal canula with activity. Please provide patient with a home unit and with portability capability. Oxygen will be for a lifetime.     azithromycin (ZITHROMAX) 250 MG tablet Take 1 tablet (250 mg) by mouth daily     diclofenac (VOLTAREN) 1 % GEL Apply 4 grams to knees or 2 grams to hands four times daily using enclosed dosing card.     polyethylene glycol (MIRALAX/GLYCOLAX) packet Take 17 g by mouth daily as needed for constipation     warfarin (COUMADIN) 7.5 MG tablet Current dose is 7.5 mg daily.  Dose adjusted per INR result.     acyclovir (ZOVIRAX) 5 % ointment Apply topically 6 times daily (Patient taking differently: Apply topically 6 times daily As needed for outbreaks)     fluticasone (FLOVENT HFA) 220 MCG/ACT inhaler Inhale 2 puffs into the lungs 2 times daily     acyclovir (ZOVIRAX) 400 MG tablet Take 400 mg by mouth See Admin Instructions 5 times daily as needed for outbreaks     fluticasone (FLONASE) 50 MCG/ACT nasal spray Spray 1-2 sprays into both nostrils daily (Patient taking differently: Spray 1-2 sprays into both nostrils daily as needed for allergies )     COMPRESSION STOCKINGS Wear compression stockings in affected leg (right leg) or both legs most of the time during the day and take them off at night.     acetaminophen (TYLENOL) 500 MG tablet Take 500 mg by mouth nightly as needed      No current facility-administered medications for this visit.              Past Medical History:     Past Medical History:   Diagnosis Date     Alcohol abuse, in remission      Allergic rhinitis, cause unspecified     allegra helps when she takes it     Antiplatelet or antithrombotic long-term use      Atrial fibrillation (H)     in hosp in 11/11 after surgery w/ fluid overload     Cardiomegaly     LVH on stress echo- cardiac w/u at [facility] ER- neg CT scan for PE, neg stress echo in 8/06     Chest pain, unspecified      Disorder of bone and cartilage,  unspecified     osteopenia (had been on prempro), improved on 6/06 dexa, stable dexa 11/10     Diverticulosis of colon (without mention of hemorrhage)     last episode yrs ago     Essential hypertension, benign      Gastro-oesophageal reflux disease      Insomnia, unspecified     weaned off clonazepam     Irregular heart beat      Lumbago 7/09    MRI with DJD, now seeing Dr. Cain for sciatic sx's     Major depressive disorder, recurrent episode, moderate (H)      Obstructive sleep apnea      Osteoarthrosis, unspecified whether generalized or localized, unspecified site      Sjogren's syndrome (H)      Sleep apnea      Tobacco use disorder     chantix in 9/07, started again in 6/08, working             Past Surgical History:     Past Surgical History:   Procedure Laterality Date     BACK SURGERY  1962     BIOPSY BREAST  9/27/02    Biopsy Left Breast     C APPENDECTOMY  1970's?     C NONSPECIFIC PROCEDURE  11/05    exploratory abd lap, adhesions, resolved RLQ pain, diverticulitis episodes     CARDIAC SURGERY       CHOLECYSTECTOMY  1990's?     COLECTOMY LEFT  11/7/2011    Procedure:COLECTOMY LEFT; Laparoscopic mobilization of splenic flexture, sigmoid colectomy, coloprotoscopy, loop illeostomy; Surgeon:CK CASTANEDA; Location:[facility] OR     HYSTERECTOMY TOTAL ABDOMINAL, BILATERAL SALPINGO-OOPHORECTOMY, COMBINED  11/7/2011    Procedure:COMBINED HYSTERECTOMY TOTAL ABDOMINAL, BILATERAL SALPINGO-OOPHORECTOMY; total abdominal hysterectomy, bilateral salpingo-oophorectomy; Surgeon:ALETA MANUEL; Location:[facility] OR     INSERT STENT URETER  11/7/2011    Procedure:INSERT STENT URETER; Placement of Bilateral Ureteral Stents ; Surgeon:PRANEETH BRYANT; Location:[facility] OR     SIGMOIDOSCOPY FLEXIBLE  11/3/2011    Procedure:SIGMOIDOSCOPY FLEXIBLE; Flexible Sigmoidoscopy; Surgeon:CK CASTANEDA; Location:[facility] OR     TAKEDOWN ILEOSTOMY  2/1/2012    Procedure:TAKEDOWN ILEOSTOMY; Takedown Loop Ileostomy ; Surgeon:CK CASTANEDA;  Location: OR             Social History:     Social History     Social History     Marital status: Single     Spouse name: N/A     Number of children: 0     Years of education: Ed Spec De     Occupational History     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     Social History Main Topics     Smoking status: Former Smoker     Packs/day: 0.50     Years: 10.00     Types: Cigarettes     Quit date: 8/1/2011     Smokeless tobacco: Never Used      Comment: 1/2 ppd     Alcohol use No      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     Drug use: No     Sexual activity: No     Other Topics Concern     Not on file     Social History Narrative    Social Documentation:        Balanced Diet: YES    Calcium intake: 1-2 per day    Caffeine: 4-5 cups per day    Exercise:  type of activity limited right now due to foot injury    Sunscreen: No    Seatbelts:  Yes    Self Breast Exam:  Yes    Self Testicular Exam: n/a    Physical/Emotional/Sexual Abuse: No    Do you feel safe in your environment? No-pt lives in [facility]        Cholesterol screen up to date: No-will check today    Eye Exam up to date: Yes    Dental Exam up to date: Yes    Pap smear up to date: Does Not Apply    Mammogram up to date: Yes-10/07    Dexa Scan up to date: Yes-2006    Colonoscopy up to date: Yes-2006    Immunizations up to date: Yes-td 2002    Glucose screen if over 40:  Yes    '09                             Family History:     Family History   Problem Relation Age of Onset     C.A.D. Mother 63     MI- first at age 63     C.A.D. Sister 52     Minor MI- age 50's     HEART DISEASE Mother      HEART DISEASE Sister      Hypertension Mother      Hypertension Sister      Hypertension Sister      Hypertension Brother      CEREBROVASCULAR DISEASE Mother      Cancer - colorectal Sister 48     Late 40's early 50's     Prostate Cancer Brother 74     Dx'd age 74     Alcohol/Drug Father      Alzheimer Disease Father       GASTROINTESTINAL DISEASE Sister      Diverticulitis     GASTROINTESTINAL DISEASE Brother      Diverticulitis     Lipids Sister      Lipids Sister      Parkinsonism Brother      DIABETES Sister      HEART DISEASE Sister      CHF     CANCER Sister      lung, smoker     Substance Abuse Sister      Substance Abuse Brother      Dementia Father      Asthma Sister      CANCER Sister      Substance Abuse Sister      Substance Abuse Brother      Asthma Sister      Hypertension Father      Breast Cancer Daughter      Prostate Cancer Brother      Hyperlipidemia Mother      Hyperlipidemia Father      Hyperlipidemia Brother              Immunization:     Immunization History   Administered Date(s) Administered     Influenza (High Dose) 3 valent vaccine 10/11/2013, 10/14/2014, 12/31/2015, 11/11/2016     Influenza (IIV3) 11/24/2003, 10/08/2004, 10/28/2005, 11/01/2006, 09/01/2010, 10/17/2011, 09/21/2012     Pneumococcal (PCV 13) 10/30/2015     Pneumococcal 23 valent 11/03/2010     TD (ADULT, 7+) 01/15/1993, 11/05/2002     TDAP (ADACEL AGES 11-64) 11/03/2010     Zoster vaccine, live 10/05/2009              Review of Systems:   I have done 10 points of review systems and pertinent findings are as above, otherwise negative.             Physical Examination:   General: Alert, oriented, not in distress  B/P: Data Unavailable, T: Data Unavailable, P: Data Unavailable, R: Data Unavailable  HEENT: neck supple, symmetrical, no lymph node enlargement, thyromegaly, bruit, JVD, pupils are isocoric and equally responsive to the light.   Lungs: both hemithoraces are symmetrical, normal to palpation, no dullness to percussion, auscultation of lungs revealed normal breathing sounds.  CVS: Normal S1 and S2, no additional heart sounds, murmur, rub, normal peripheral pulses  Abdomen: Bowel sounds present, soft, non tender, non distended, no organomegaly, ascitis, mass  Extremities/musculoskeletal: no peripheral edema, deformity, cyanosis,  clubbing  Neurology: alert, orientedx3, no motor/sensorial deficits, cranial nerves grossly normal  Skin: no rash  Psychiatry: Mood and affect are appropriate             DATA:     CBC RESULTS:     Recent Labs   Lab Test  01/24/17   1338  09/20/16   1045   WBC  12.4*  10.1   RBC  4.26  4.36   HGB  9.8*  10.4*   HCT  33.5*  35.5   PLT  306  336       Basic Metabolic Panel:  Recent Labs   Lab Test  01/24/17   1338  01/12/17   1316   NA  138  139   POTASSIUM  3.5  3.3*   CHLORIDE  99  101   CO2  29  27   BUN  15  12   CR  0.81  0.87   GLC  351*  278*   CLAUDY  8.7  8.8       INR  Recent Labs   Lab Test  02/17/17   1836 01/24/17   INR  2.50*  2.7*        PFT   PFT Latest Ref Rng & Units 3/22/2017   FVC L 1.62   FEV1 L 1.29   FVC% % 58   FEV1% % 60     Thank you for allowing me participate in the care of Betty Tee.    Meño Walsh M.D.  Associate Professor of Medicine  Pulmonary, Allergy, Critical Care and Sleep

## 2017-03-22 NOTE — MR AVS SNAPSHOT
After Visit Summary   3/22/2017    Betty Tee    MRN: [id number]           Patient Information     Date Of Birth          1940        Visit Information        Provider Department      3/22/2017 1:00 PM Meño Walsh MD [facility]        Today's Diagnoses     ILD (interstitial lung disease) (H)    -  1    Hypoxia           Follow-ups after your visit        Additional Services     PULMONARY REHAB REFERRAL           SPEECH PATHOLOGY REFERRAL       Your provider has referred you to:  Swallow evaluation    Services Ordered: Video Swallow  Duration and Frequency: Per Therapist Evaluation  Diagnosis: Adult: Dysphagia - 787.2    Please be aware that coverage of these services is subject to the terms and limitations of your health insurance plan.  Call member services at your health plan with any benefit or coverage questions.      Please bring the following to your appointment:    >>   Any x-rays, CTs or MRIs which have been performed.  Contact the facility where they were done to arrange for  prior to your scheduled appointment.   >>   List of current medications   >>   This referral request   >>   Any documents/labs given to you for this referral                  Follow-up notes from your care team     Return in about 3 months (around 6/22/2017).      Your next 10 appointments already scheduled     Mar 28, 2017  1:30 PM CDT   Office Visit with Sabrina Meek [facility] ([facility])    [address] [facility]  [address] [place] [address] [phone]           Bring a current list of meds and any records pertaining to this visit.  For Physicals, please bring immunization records and any forms needing to be filled out.  Please arrive 10 minutes early to complete paperwork.            Apr 12, 2017  1:30 PM CDT   Office Visit with Ilene Tristan MD   [facility] ([facility])     [address]  [facility] [id number] [phone]           Bring a current list of meds and any records pertaining to this visit.  For Physicals, please bring immunization records and any forms needing to be filled out.  Please arrive 10 minutes early to complete paperwork.            Jun 22, 2017  4:30 PM CDT   Lab with UC LAB   [facility] ([facility])    [address]  [address] [facility] [address] [phone]            Jun 22, 2017  5:00 PM CDT   (Arrive by 4:45 PM)   RETURN HEART FAILURE with Radha Rosa MD   [facility] ([facility])    [address]  [address] [facility] [address] [phone]              Future tests that were ordered for you today     Open Future Orders        [facility] Expected Expires Ordered    PULMONARY REHAB REFERRAL Routine  3/22/2018 3/22/2017    General PFT Lab (Please always keep checked) Routine  3/22/2018 3/22/2017    Pulmonary Function Test Routine  3/22/2018 3/22/2017            Who to contact     If you have questions or need follow up information about today's clinic visit or your schedule please contact [facility] directly at [phone].  Normal or non-critical lab and imaging results will be communicated to you by Street[employer]orhart, letter or phone within 4 business days after the clinic has received the results. If you do not hear from us within 7 days, please contact the clinic through MyChart or phone. If you have a critical or abnormal lab result, we will notify you by phone as soon as possible.  Submit refill requests through De[employer]licks or call your pharmacy and they will forward the refill request to us. Please allow 3 business days for your refill to be completed.          Additional Information About Your Visit        Dealflicks Information     Dealflicks gives you secure access to your electronic health record. If you see a primary  "care provider, you can also send messages to your care team and make appointments. If you have questions, please call your primary care clinic.  If you do not have a primary care provider, please call [phone] and they will assist you.        Care EveryWhere ID     This is your Care EveryWhere ID. This could be used by other organizations to access your [place] medical records  [id number]        Your Vitals Were     Height BMI (Body Mass Index)                1.695 m (5' 6.73\") 32.56 kg/m2           Blood Pressure from Last 3 Encounters:   03/08/17 130/70   02/17/17 127/72   01/12/17 116/64    Weight from Last 3 Encounters:   03/22/17 93.5 kg (206 lb 3.2 oz)   03/08/17 93.5 kg (206 lb 3.2 oz)   01/12/17 98.9 kg (218 lb)              We Performed the Following     Overnight oximetry study     SPEECH PATHOLOGY REFERRAL          Today's Medication Changes          These changes are accurate as of: 3/22/17  5:27 PM.  If you have any questions, ask your nurse or doctor.               These medicines have changed or have updated prescriptions.        Dose/Directions    acyclovir 5 % ointment   Commonly known as:  ZOVIRAX   This may have changed:  additional instructions   Used for:  Recurrent cold sores        Apply topically 6 times daily   Quantity:  15 g   Refills:  3       fluticasone 50 MCG/ACT spray   Commonly known as:  FLONASE   This may have changed:    - when to take this  - reasons to take this   Used for:  Seasonal allergic rhinitis        Dose:  1-2 spray   Spray 1-2 sprays into both nostrils daily   Quantity:  16 g   Refills:  5       * order for DME   This may have changed:  Another medication with the same name was added. Make sure you understand how and when to take each.   Used for:  Hypoxia, ILD (interstitial lung disease) (H)   Changed by:  Tonia Graham MD        Oxygen: Patient requires supplemental Oxygen 4 LPM via nasal canula with activity. Please provide patient with a home unit and " with portability capability. Oxygen will be for a lifetime.   Quantity:  1 Device   Refills:  0       * order for DME   This may have changed:  You were already taking a medication with the same name, and this prescription was added. Make sure you understand how and when to take each.   Used for:  Hypoxia, ILD (interstitial lung disease) (H)   Changed by:  Meño Walsh MD        Oxygen: Patient requires supplemental Oxygen 4 LPM via nasal canula with activity. Please provide patient with POC to improve mobility, okay to titrate for conserving to keep stats above 90%. Please fax results to [phone].  Oxygen will be for a lifetime.   Quantity:  1 Device   Refills:  0       * Notice:  This list has 2 medication(s) that are the same as other medications prescribed for you. Read the directions carefully, and ask your doctor or other care provider to review them with you.         Where to get your medicines      Some of these will need a paper prescription and others can be bought over the counter.  Ask your nurse if you have questions.     Bring a paper prescription for each of these medications     order for DME                Primary Care Provider Office Phone # Fax #    Ilene Tristan [phone]       [facility] [address] [place], [place] [id number]        Thank you!     Thank you for choosing [facility]  for your care. Our goal is always to provide you with excellent care. Hearing back from our patients is one way we can continue to improve our services. Please take a few minutes to complete the written survey that you may receive in the mail after your visit with us. Thank you!             Your Updated Medication List - Protect others around you: Learn how to safely use, store and throw away your medicines at www.disposemymeds.org.          This list is accurate as of: 3/22/17  5:27 PM.  Always use your most recent med list.                    Brand Name Dispense Instructions for use    acyclovir 400 MG tablet    ZOVIRAX     Take 400 mg by mouth See Admin Instructions 5 times daily as needed for outbreaks       acyclovir 5 % ointment    ZOVIRAX    15 g    Apply topically 6 times daily       albuterol 108 (90 BASE) MCG/ACT Inhaler    PROAIR HFA/PROVENTIL HFA/VENTOLIN HFA    1 Inhaler    Inhale 2 puffs into the lungs every 6 hours       azithromycin 250 MG tablet    ZITHROMAX    30 tablet    Take 1 tablet (250 mg) by mouth daily       calcium-vitamin D 600-400 MG-UNIT per tablet    CALTRATE    60 tablet    Take 1 tablet by mouth 2 times daily       COMPRESSION STOCKINGS     2 each    Wear compression stockings in affected leg (right leg) or both legs most of the time during the day and take them off at night.       diclofenac 1 % Gel topical gel    VOLTAREN    100 g    Apply 4 grams to knees or 2 grams to hands four times daily using enclosed dosing card.       fluticasone 220 MCG/ACT Inhaler    FLOVENT HFA    3 Inhaler    Inhale 2 puffs into the lungs 2 times daily       fluticasone 50 MCG/ACT spray    FLONASE    16 g    Spray 1-2 sprays into both nostrils daily       furosemide 40 MG tablet    LASIX    180 tablet    Take 1 tablet (40 mg) by mouth 2 times daily       HYDROcodone-acetaminophen 5-325 MG per tablet    NORCO    15 tablet    Take 1-2 tablets by mouth every 4 hours as needed for moderate to severe pain       ketoconazole 2 % cream    NIZORAL    60 g    Apply topically 2 times daily       metFORMIN 500 MG 24 hr tablet    GLUCOPHAGE-XR    30 tablet    Take 1 tablet (500 mg) by mouth daily (with dinner)       metoprolol 100 MG 24 hr tablet    TOPROL-XL    90 tablet    Take 1 tablet (100 mg) by mouth daily       montelukast 10 MG tablet    SINGULAIR    90 tablet    Take 1 tablet (10 mg) by mouth At Bedtime       * order for DME     1 Device    Oxygen: Patient requires supplemental Oxygen 4 LPM via nasal canula with activity. Please provide  patient with a home unit and with portability capability. Oxygen will be for a lifetime.       * order for DME     1 Device    Oxygen: Patient requires supplemental Oxygen 4 LPM via nasal canula with activity. Please provide patient with POC to improve mobility, okay to titrate for conserving to keep stats above 90%. Please fax results to [phone].  Oxygen will be for a lifetime.       pantoprazole 20 MG EC tablet    PROTONIX    60 tablet    Take 1-2 tablets (20-40 mg) by mouth daily       PARoxetine 10 MG tablet    PAXIL    30 tablet    TAKE 1 TABLET (10mg) BY MOUTH AT BEDTIME       polyethylene glycol Packet    MIRALAX/GLYCOLAX    7 packet    Take 17 g by mouth daily as needed for constipation       predniSONE 10 MG tablet    DELTASONE    90 tablet    Take 1 tablet (10 mg) by mouth daily       spironolactone 25 MG tablet    ALDACTONE    90 tablet    Take 1 tablet (25 mg) by mouth daily       traZODone 50 MG tablet    DESYREL    60 tablet    TAKE 1/2-1 TABLET BY MOUTH NIGHTLY AS NEEDED, CAN TITRATE DOWN TO 1/2TAB OR UP TO 2TABS AS NEEDED       TYLENOL 500 MG tablet   Generic drug:  acetaminophen      Take 500 mg by mouth nightly as needed       warfarin 7.5 MG tablet    COUMADIN    100 tablet    Current dose is 7.5 mg daily.  Dose adjusted per INR result.       * Notice:  This list has 2 medication(s) that are the same as other medications prescribed for you. Read the directions carefully, and ask your doctor or other care provider to review them with you.

## 2017-03-23 ENCOUNTER — CARE COORDINATION (OUTPATIENT)
Dept: PULMONOLOGY | Facility: CLINIC | Age: 77
End: 2017-03-23

## 2017-03-23 DIAGNOSIS — J84.9 ILD (INTERSTITIAL LUNG DISEASE) (H): Primary | ICD-10-CM

## 2017-03-23 NOTE — PROGRESS NOTES
RN was contacted by Dr. Walsh that lung nodule unchanged, no follow up needed. Oxygen orders were sent to [facility] for POC. RN spoke with patient and reviewed results. All questions were answered. Pt encouraged to call if further questions/concerns.  Edda Eli RN BSN

## 2017-03-24 ENCOUNTER — MEDICAL CORRESPONDENCE (OUTPATIENT)
Dept: HEALTH INFORMATION MANAGEMENT | Facility: CLINIC | Age: 77
End: 2017-03-24

## 2017-03-28 ENCOUNTER — OFFICE VISIT (OUTPATIENT)
Dept: PHARMACY | Facility: CLINIC | Age: 77
End: 2017-03-28
Payer: COMMERCIAL

## 2017-03-28 ENCOUNTER — TELEPHONE (OUTPATIENT)
Dept: FAMILY MEDICINE | Facility: CLINIC | Age: 77
End: 2017-03-28
Payer: MEDICARE

## 2017-03-28 DIAGNOSIS — Z79.01 LONG TERM CURRENT USE OF ANTICOAGULANT THERAPY: Primary | ICD-10-CM

## 2017-03-28 DIAGNOSIS — E11.9 TYPE 2 DIABETES MELLITUS WITHOUT COMPLICATION, WITHOUT LONG-TERM CURRENT USE OF INSULIN (H): ICD-10-CM

## 2017-03-28 LAB — INR POINT OF CARE: 2.3 (ref 0.86–1.14)

## 2017-03-28 PROCEDURE — 99207 ZZC NO CHARGE NURSE ONLY: CPT | Performed by: FAMILY MEDICINE

## 2017-03-28 PROCEDURE — 99605 MTMS BY PHARM NP 15 MIN: CPT | Performed by: PHARMACIST

## 2017-03-28 PROCEDURE — 36416 COLLJ CAPILLARY BLOOD SPEC: CPT | Performed by: FAMILY MEDICINE

## 2017-03-28 PROCEDURE — 85610 PROTHROMBIN TIME: CPT | Mod: QW | Performed by: FAMILY MEDICINE

## 2017-03-28 PROCEDURE — 99607 MTMS BY PHARM ADDL 15 MIN: CPT | Performed by: PHARMACIST

## 2017-03-28 RX ORDER — METFORMIN HCL 500 MG
1000 TABLET, EXTENDED RELEASE 24 HR ORAL
Qty: 180 TABLET | Refills: 0 | Status: SHIPPED | OUTPATIENT
Start: 2017-03-28 | End: 2017-09-08

## 2017-03-28 NOTE — MR AVS SNAPSHOT
After Visit Summary   3/28/2017    Betty Tee    MRN: [id number]           Patient Information     Date Of Birth          1940        Visit Information        Provider Department      3/28/2017 1:30 PM Sabrina Meek, [facility] MTM        Care Instructions    Recommendations from today's MTM visit:                                                      1. Start keeping track of your meals - try to stick to 3-4 carb choices (or 45-60 grams of carbohydrates) for three meals/day and 1-2 choices (15-30 grams) with each meal.     2. Increase your metformin to two tablets once a day with food. You can take either one tablet in the morning and one in the evening. Or you can take both tablets at the same time.     Next MTM visit: 2 Weeks - Thursday April 13th at 1:30pm.     To schedule another MTM appointment, please call the clinic directly or you may call the MTM scheduling line at [phone] or toll-free at [phone].     My Clinical Pharmacist's contact information:                                                      It was a pleasure seeing you today!  Please feel free to contact me with any questions or concerns you have.      Sabrina Meek, Pharm.D., M.B.A., BCACP  MTM Pharmacist, [facility]  Pager: [phone]  Email: [email or web address]     You may receive a survey about the MTM services you received.  I would appreciate your feedback to help me serve you better in the future. Please fill it out and return it when you can. Your comments will be anonymous.              Follow-ups after your visit        Your next 10 appointments already scheduled     Apr 11, 2017  9:00 AM CDT   XR VIDEO SPEECH EVALUATION WITH ESOPHAGRAM with UCXR2, UC GIGU RAD   [facility] ([facility])    [address] [address] [phone]           PPlease bring a list of your current medicines  to your exam. (Include vitamins, minerals and over-the-counter medicines.) Leave your valuables at home.  Tell the doctor if there is a chance you could be pregnant.  Adults: You do not need to do anything special for this exam.  Pediatric Nothing by mouth 3 hours prior.  Please call the Imaging Department at your exam site with any questions.            Apr 11, 2017  9:00 AM CDT   Video Swallow with JERRY Choi   [facility] ([facility])    [address] [place] [address]  [address]th [facility] [address] [phone]           Please check in for this visit in Imaging on the 1st floor.            Apr 12, 2017  1:30 PM CDT   Office Visit with Ilene Tristan MD   [facility] ([facility])    [address] [facility] [address] [phone]           Bring a current list of meds and any records pertaining to this visit.  For Physicals, please bring immunization records and any forms needing to be filled out.  Please arrive 10 minutes early to complete paperwork.            Apr 13, 2017  1:30 PM CDT   Office Visit with Sabrina Meek [facility] ([facility])    [address] [place]  [address]  [facility] [address] [phone]           Bring a current list of meds and any records pertaining to this visit.  For Physicals, please bring immunization records and any forms needing to be filled out.  Please arrive 10 minutes early to complete paperwork.            Jun 22, 2017  4:30 PM CDT   Lab with  LAB   [facility] ([facility])    [address] [place] [address]  1st [facility] [address] [phone]            Jun 22, 2017  5:00 PM CDT   (Arrive by 4:45 PM)   RETURN HEART FAILURE with Radha Rosa MD   [facility] ([facility])    [address] [place] [address]  3rd [facility] [address] [phone]               Who to contact     If you have questions or need follow up information about today's clinic visit or your schedule please contact [facility] MTM directly at [phone].  Normal or non-critical lab and imaging results will be communicated to you by MyChart, letter or phone within 4 business days after the clinic has received the results. If you do not hear from us within 7 days, please contact the clinic through MyChart or phone. If you have a critical or abnormal lab result, we will notify you by phone as soon as possible.  Submit refill requests through [employer] or call your pharmacy and they will forward the refill request to us. Please allow 3 business days for your refill to be completed.          Additional Information About Your Visit        QQTechnologyhart Information     [employer] gives you secure access to your electronic health record. If you see a primary care provider, you can also send messages to your care team and make appointments. If you have questions, please call your primary care clinic.  If you do not have a primary care provider, please call [phone] and they will assist you.        Care EveryWhere ID     This is your Care EveryWhere ID. This could be used by other organizations to access your [place] medical records  [id number]         Blood Pressure from Last 3 Encounters:   03/08/17 130/70   02/17/17 127/72   01/12/17 116/64    Weight from Last 3 Encounters:   03/22/17 206 lb 3.2 oz (93.5 kg)   03/08/17 206 lb 3.2 oz (93.5 kg)   01/12/17 218 lb (98.9 kg)              Today, you had the following     No orders found for display         Today's Medication Changes          These changes are accurate as of: 3/28/17  2:28 PM.  If you have any questions, ask your nurse or doctor.               These medicines have changed or have updated prescriptions.        Dose/Directions    acyclovir 5 % ointment   Commonly known as:  ZOVIRAX   This may have changed:  additional instructions   Used  for:  Recurrent cold sores        Apply topically 6 times daily   Quantity:  15 g   Refills:  3       fluticasone 50 MCG/ACT spray   Commonly known as:  FLONASE   This may have changed:    - when to take this  - reasons to take this   Used for:  Seasonal allergic rhinitis        Dose:  1-2 spray   Spray 1-2 sprays into both nostrils daily   Quantity:  16 g   Refills:  5                Primary Care Provider Office Phone # Fax #    Ilene Tristan [phone]       [facility] [address]  [facility] [address]        Thank you!     Thank you for choosing [facility]  for your care. Our goal is always to provide you with excellent care. Hearing back from our patients is one way we can continue to improve our services. Please take a few minutes to complete the written survey that you may receive in the mail after your visit with us. Thank you!             Your Updated Medication List - Protect others around you: Learn how to safely use, store and throw away your medicines at www.disposemymeds.org.          This list is accurate as of: 3/28/17  2:28 PM.  Always use your most recent med list.                   Brand Name Dispense Instructions for use    acyclovir 400 MG tablet    ZOVIRAX     Take 400 mg by mouth See Admin Instructions 5 times daily as needed for outbreaks       acyclovir 5 % ointment    ZOVIRAX    15 g    Apply topically 6 times daily       albuterol 108 (90 BASE) MCG/ACT Inhaler    PROAIR HFA/PROVENTIL HFA/VENTOLIN HFA    1 Inhaler    Inhale 2 puffs into the lungs every 6 hours       azithromycin 250 MG tablet    ZITHROMAX    30 tablet    Take 1 tablet (250 mg) by mouth daily       calcium-vitamin D 600-400 MG-UNIT per tablet    CALTRATE    60 tablet    Take 1 tablet by mouth 2 times daily       COMPRESSION STOCKINGS     2 each    Wear compression stockings in affected leg (right leg) or both legs most of the time during the day and take them  off at night.       diclofenac 1 % Gel topical gel    VOLTAREN    100 g    Apply 4 grams to knees or 2 grams to hands four times daily using enclosed dosing card.       fluticasone 220 MCG/ACT Inhaler    FLOVENT HFA    3 Inhaler    Inhale 2 puffs into the lungs 2 times daily       fluticasone 50 MCG/ACT spray    FLONASE    16 g    Spray 1-2 sprays into both nostrils daily       furosemide 40 MG tablet    LASIX    180 tablet    Take 1 tablet (40 mg) by mouth 2 times daily       HYDROcodone-acetaminophen 5-325 MG per tablet    NORCO    15 tablet    Take 1-2 tablets by mouth every 4 hours as needed for moderate to severe pain       ketoconazole 2 % cream    NIZORAL    60 g    Apply topically 2 times daily       metFORMIN 500 MG 24 hr tablet    GLUCOPHAGE-XR    30 tablet    Take 1 tablet (500 mg) by mouth daily (with dinner)       metoprolol 100 MG 24 hr tablet    TOPROL-XL    90 tablet    Take 1 tablet (100 mg) by mouth daily       montelukast 10 MG tablet    SINGULAIR    90 tablet    Take 1 tablet (10 mg) by mouth At Bedtime       * order for DME     1 Device    Oxygen: Patient requires supplemental Oxygen 4 LPM via nasal canula with activity. Please provide patient with a home unit and with portability capability. Oxygen will be for a lifetime.       * order for DME     1 Device    Oxygen: Patient requires supplemental Oxygen 4 LPM via nasal canula with activity. Please provide patient with POC to improve mobility, okay to titrate for conserving to keep stats above 90%. Please fax results to [phone].  Oxygen will be for a lifetime.       pantoprazole 20 MG EC tablet    PROTONIX    60 tablet    Take 1-2 tablets (20-40 mg) by mouth daily       PARoxetine 10 MG tablet    PAXIL    30 tablet    TAKE 1 TABLET (10mg) BY MOUTH AT BEDTIME       polyethylene glycol Packet    MIRALAX/GLYCOLAX    7 packet    Take 17 g by mouth daily as needed for constipation       predniSONE 10 MG tablet    DELTASONE    90 tablet    Take 1  tablet (10 mg) by mouth daily       spironolactone 25 MG tablet    ALDACTONE    90 tablet    Take 1 tablet (25 mg) by mouth daily       traZODone 50 MG tablet    DESYREL    60 tablet    TAKE 1/2-1 TABLET BY MOUTH NIGHTLY AS NEEDED, CAN TITRATE DOWN TO 1/2TAB OR UP TO 2TABS AS NEEDED       TYLENOL 500 MG tablet   Generic drug:  acetaminophen      Take 500 mg by mouth nightly as needed       warfarin 7.5 MG tablet    COUMADIN    100 tablet    Current dose is 7.5 mg daily.  Dose adjusted per INR result.       * Notice:  This list has 2 medication(s) that are the same as other medications prescribed for you. Read the directions carefully, and ask your doctor or other care provider to review them with you.

## 2017-03-28 NOTE — TELEPHONE ENCOUNTER
ANTICOAGULATION FOLLOW-UP CLINIC VISIT    Patient Name:  Betty Tee  Date:  3/28/2017  Contact Type:  Face to Face. Dose instructions given to patient today while in clinic. Saw MTM today    SUBJECTIVE:  Bleeding Signs/Symptoms: None  Thromboembolic Signs/Symptoms: None    Medication Changes:  No  Dietary Changes:  No  Bacterial/Viral Infection:  No    Missed Coumadin Doses:  None  Other Concerns:  No      ASSESSMENT/PLAN:  See: ANTICOAGULATION QIC flow sheet.    INR 2.3  Plan: Continue 7.5 mg daily = 52.5 mg/wk. Recheck INR in 4 weeks.  Sophia Hemphill RN      Dosage adjustment made based on physician directed care plan.    JIMI VOGT

## 2017-03-28 NOTE — PROGRESS NOTES
SUBJECTIVE/OBJECTIVE:                                                    Betty Tee is a 76 year old female coming in for a follow-up visit for Medication Therapy Management.  She was referred to me from Dr. Tristan. She is joined by Nghia, her health care agent, today.     Chief Complaint: Follow up from our visit on 12/8.  A1c increased - here to discuss diet.   Tobacco: No tobacco use   Alcohol: not currently using    Medication Adherence: no issues reported    Diabetes:  Pt currently taking Metformin XR 500mg once daily. Started by cutting tab in half and only taking 250mg daily - now up to 500mg daily without significant issues. Only mild diarrhea on a day when she drank prune juice with the Metformin.   SMBG: never - not really interested in doing this, however she is under the impression we can just check whenever she comes into the office.   Diet/Exercise: Very limited on exercise due to SOB (on O2 therapy). Walks when she can. Diet wise, she's convinced she doesn't need to make many changes.   Current diet:   Breakfast - cream of wheat, steel cut oats w/banana and milk, or poached or scrambled eggs  Lunch - Doesn't eat faithfully, may grab a thin bun with butter and peanut  Dinner - meat and potatoes +/- salad. Doesn't take the time to cook vegetables.   Has lost 26 pounds - trying to eat smaller portions. Switched from squash to potatoes. Has increased fluid intake. Does have some gatorade, juices, and a lot of water.     Current labs include:  BP Readings from Last 3 Encounters:   03/08/17 130/70   02/17/17 127/72   01/12/17 116/64     Today's Vitals: She refused vitals today.   Lab Results   Component Value Date    A1C 9.4 01/24/2017   .  Lab Results   Component Value Date    CHOL 153 09/20/2016     Lab Results   Component Value Date    TRIG 123 09/20/2016     Lab Results   Component Value Date    HDL 72 09/20/2016     Lab Results   Component Value Date    LDL 56 09/20/2016       Liver Function  Studies -   Recent Labs   Lab Test  11/03/16   1136   PROTTOTAL  8.0   ALBUMIN  3.4   BILITOTAL  0.7   ALKPHOS  70   AST  12   ALT  21       Lab Results   Component Value Date    UCRR 48 09/20/2016    MICROL 10 09/20/2016    UMALCR 21.13 09/20/2016       Last Basic Metabolic Panel:  Lab Results   Component Value Date     01/24/2017      Lab Results   Component Value Date    POTASSIUM 3.5 01/24/2017     Lab Results   Component Value Date    CHLORIDE 99 01/24/2017     Lab Results   Component Value Date    BUN 15 01/24/2017     Lab Results   Component Value Date    CR 0.81 01/24/2017     GFR Estimate   Date Value Ref Range Status   01/24/2017 68 >60 mL/min/1.7m2 Final     Comment:     Non  GFR Calc   01/12/2017 63 >60 mL/min/1.7m2 Final     Comment:     Non  GFR Calc   12/14/2016 60 (L) >60 mL/min/1.7m2 Final     Comment:     Non  GFR Calc     TSH   Date Value Ref Range Status   09/24/2004 3.12 0.4 - 5.0 mU/L Final   ]    Most Recent Immunizations   Administered Date(s) Administered     Influenza (High Dose) 3 valent vaccine 11/11/2016     Influenza (IIV3) 09/21/2012     Pneumococcal (PCV 13) 10/30/2015     Pneumococcal 23 valent 11/03/2010     TD (ADULT, 7+) 11/05/2002     TDAP Vaccine (Adacel) 11/03/2010     Zoster vaccine, live 10/05/2009     ASSESSMENT:                                                    Current medications were reviewed today as discussed above.      Medication Adherence: no issues identified    Diabetes: Discussed A1c and correlation to her current BG level (likely around 240). Discussed that at A1cs around 9 we often are discussing the need for insulin and that they should be taken seriously. Discussed diet changes at length - which are met with resistance (but it's unclear if she is resistant or just overwhelmed). We did discuss the need to test blood sugars, but I'm not sure she is able to comprehend additional information today - so will  hold off on this until follow-up. Agree with Dr. Tristan that we should attempt to maximize Metformin dose.      PLAN:                                                      1. Start keeping track of your meals - try to stick to 3-4 carb choices (or 45-60 grams of carbohydrates) for three meals/day and 1-2 choices (15-30 grams) with each meal.     2. Increase your metformin to two tablets once a day with food. You can take either one tablet in the morning and one in the evening. Or you can take both tablets at the same time.     I spent 60 minutes with this patient today.  All changes were made via collaborative practice agreement with Ilene Tristan. A copy of the visit note was provided to the patient's primary care provider.     Will follow up in 2 weeks.    The patient was given a summary of these recommendations as an after visit summary.    Sabrina Meek, MarcosD, JAMES, BCACP  MTM Pharmacist, [facility]

## 2017-03-28 NOTE — PATIENT INSTRUCTIONS
Recommendations from today's MTM visit:                                                      1. Start keeping track of your meals - try to stick to 3-4 carb choices (or 45-60 grams of carbohydrates) for three meals/day and 1-2 choices (15-30 grams) with each meal.     2. Increase your metformin to two tablets once a day with food. You can take either one tablet in the morning and one in the evening. Or you can take both tablets at the same time.     Next MTM visit: 2 Weeks - Thursday April 13th at 1:30pm.     To schedule another MTM appointment, please call the clinic directly or you may call the MTM scheduling line at [phone] or toll-free at [phone].     My Clinical Pharmacist's contact information:                                                      It was a pleasure seeing you today!  Please feel free to contact me with any questions or concerns you have.      Sabrina Meek, Pharm.D., M.B.A., [facility]  MTM Pharmacist, [facility]  Pager: [phone]  Email: [email or web address]     You may receive a survey about the MTM services you received.  I would appreciate your feedback to help me serve you better in the future. Please fill it out and return it when you can. Your comments will be anonymous.

## 2017-04-02 ENCOUNTER — HOSPITAL ENCOUNTER (INPATIENT)
Facility: CLINIC | Age: 77
LOS: 8 days | Discharge: NURSING, REHAB OR LONG-TERM CARE | DRG: 193 | End: 2017-04-11
Attending: EMERGENCY MEDICINE | Admitting: HOSPITALIST
Payer: MEDICARE

## 2017-04-02 ENCOUNTER — APPOINTMENT (OUTPATIENT)
Dept: GENERAL RADIOLOGY | Facility: CLINIC | Age: 77
DRG: 193 | End: 2017-04-02
Attending: EMERGENCY MEDICINE
Payer: MEDICARE

## 2017-04-02 ENCOUNTER — APPOINTMENT (OUTPATIENT)
Dept: CT IMAGING | Facility: CLINIC | Age: 77
DRG: 193 | End: 2017-04-02
Attending: EMERGENCY MEDICINE
Payer: MEDICARE

## 2017-04-02 DIAGNOSIS — R50.9 FEVER, UNSPECIFIED: ICD-10-CM

## 2017-04-02 DIAGNOSIS — J44.89 FOLLICULAR BRONCHIOLITIS (H): ICD-10-CM

## 2017-04-02 DIAGNOSIS — I48.91 ATRIAL FIBRILLATION, UNSPECIFIED TYPE (H): ICD-10-CM

## 2017-04-02 DIAGNOSIS — I10 ESSENTIAL HYPERTENSION, MALIGNANT: ICD-10-CM

## 2017-04-02 DIAGNOSIS — Z79.01 LONG TERM (CURRENT) USE OF ANTICOAGULANTS: ICD-10-CM

## 2017-04-02 DIAGNOSIS — M35.00 SJOGREN'S SYNDROME (H): ICD-10-CM

## 2017-04-02 DIAGNOSIS — J84.9 ILD (INTERSTITIAL LUNG DISEASE) (H): ICD-10-CM

## 2017-04-02 DIAGNOSIS — J18.8 OTHER PNEUMONIA, UNSPECIFIED ORGANISM: ICD-10-CM

## 2017-04-02 DIAGNOSIS — I50.9 ACUTE ON CHRONIC HEART FAILURE, UNSPECIFIED HEART FAILURE TYPE (H): ICD-10-CM

## 2017-04-02 DIAGNOSIS — I48.91 ATRIAL FIBRILLATION WITH RAPID VENTRICULAR RESPONSE (H): ICD-10-CM

## 2017-04-02 DIAGNOSIS — R42 DIZZINESS: ICD-10-CM

## 2017-04-02 DIAGNOSIS — Z86.718 PERSONAL HISTORY OF VENOUS THROMBOSIS AND EMBOLISM: ICD-10-CM

## 2017-04-02 DIAGNOSIS — J18.9 PNEUMONIA DUE TO INFECTIOUS ORGANISM, UNSPECIFIED LATERALITY, UNSPECIFIED PART OF LUNG: ICD-10-CM

## 2017-04-02 DIAGNOSIS — E11.69 TYPE 2 DIABETES MELLITUS WITH OTHER SPECIFIED COMPLICATION (H): Primary | ICD-10-CM

## 2017-04-02 LAB
ALBUMIN SERPL-MCNC: 3 G/DL (ref 3.4–5)
ALP SERPL-CCNC: 72 U/L (ref 40–150)
ALT SERPL W P-5'-P-CCNC: 19 U/L (ref 0–50)
ANION GAP SERPL CALCULATED.3IONS-SCNC: 12 MMOL/L (ref 3–14)
AST SERPL W P-5'-P-CCNC: 29 U/L (ref 0–45)
BASOPHILS # BLD AUTO: 0 10E9/L (ref 0–0.2)
BASOPHILS NFR BLD AUTO: 0.5 %
BILIRUB SERPL-MCNC: 0.6 MG/DL (ref 0.2–1.3)
BUN SERPL-MCNC: 7 MG/DL (ref 7–30)
CA-I BLD-SCNC: 4.6 MG/DL (ref 4.4–5.2)
CALCIUM SERPL-MCNC: 8.2 MG/DL (ref 8.5–10.1)
CHLORIDE SERPL-SCNC: 108 MMOL/L (ref 94–109)
CO2 BLDCOV-SCNC: 24 MMOL/L (ref 21–28)
CO2 BLDCOV-SCNC: 26 MMOL/L (ref 21–28)
CO2 SERPL-SCNC: 23 MMOL/L (ref 20–32)
CREAT BLD-MCNC: 0.6 MG/DL (ref 0.52–1.04)
CREAT SERPL-MCNC: 0.58 MG/DL (ref 0.52–1.04)
DIFFERENTIAL METHOD BLD: ABNORMAL
EOSINOPHIL # BLD AUTO: 0.1 10E9/L (ref 0–0.7)
EOSINOPHIL NFR BLD AUTO: 1.4 %
ERYTHROCYTE [DISTWIDTH] IN BLOOD BY AUTOMATED COUNT: 18.6 % (ref 10–15)
FLUAV+FLUBV AG SPEC QL: NEGATIVE
FLUAV+FLUBV AG SPEC QL: NORMAL
GFR SERPL CREATININE-BSD FRML MDRD: >90 ML/MIN/1.7M2
GFR SERPL CREATININE-BSD FRML MDRD: ABNORMAL ML/MIN/1.7M2
GLUCOSE BLD-MCNC: 234 MG/DL (ref 70–99)
GLUCOSE BLDC GLUCOMTR-MCNC: 228 MG/DL (ref 70–99)
GLUCOSE SERPL-MCNC: 213 MG/DL (ref 70–99)
HCT VFR BLD AUTO: 34.6 % (ref 35–47)
HCT VFR BLD CALC: 34 %PCV (ref 35–47)
HGB BLD CALC-MCNC: 11.6 G/DL (ref 11.7–15.7)
HGB BLD-MCNC: 9.9 G/DL (ref 11.7–15.7)
IMM GRANULOCYTES # BLD: 0 10E9/L (ref 0–0.4)
IMM GRANULOCYTES NFR BLD: 0.5 %
INR BLD: 1.3 (ref 0.86–1.14)
INR PPP: 1.79 (ref 0.86–1.14)
LACTATE BLD-SCNC: 1.5 MMOL/L (ref 0.7–2.1)
LACTATE BLD-SCNC: 1.6 MMOL/L (ref 0.7–2.1)
LIPASE SERPL-CCNC: 136 U/L (ref 73–393)
LYMPHOCYTES # BLD AUTO: 0.9 10E9/L (ref 0.8–5.3)
LYMPHOCYTES NFR BLD AUTO: 14.4 %
MCH RBC QN AUTO: 21.2 PG (ref 26.5–33)
MCHC RBC AUTO-ENTMCNC: 28.6 G/DL (ref 31.5–36.5)
MCV RBC AUTO: 74 FL (ref 78–100)
MONOCYTES # BLD AUTO: 0.8 10E9/L (ref 0–1.3)
MONOCYTES NFR BLD AUTO: 11.9 %
NEUTROPHILS # BLD AUTO: 4.5 10E9/L (ref 1.6–8.3)
NEUTROPHILS NFR BLD AUTO: 71.3 %
NRBC # BLD AUTO: 0 10*3/UL
NRBC BLD AUTO-RTO: 0 /100
PCO2 BLDV: 35 MM HG (ref 40–50)
PCO2 BLDV: 36 MM HG (ref 40–50)
PH BLDV: 7.46 PH (ref 7.32–7.43)
PH BLDV: 7.46 PH (ref 7.32–7.43)
PLATELET # BLD AUTO: 218 10E9/L (ref 150–450)
PO2 BLDV: 33 MM HG (ref 25–47)
PO2 BLDV: 35 MM HG (ref 25–47)
POTASSIUM BLD-SCNC: 3.4 MMOL/L (ref 3.4–5.3)
POTASSIUM SERPL-SCNC: 3.3 MMOL/L (ref 3.4–5.3)
PROT SERPL-MCNC: 7 G/DL (ref 6.8–8.8)
RBC # BLD AUTO: 4.66 10E12/L (ref 3.8–5.2)
SAO2 % BLDV FROM PO2: 68 %
SAO2 % BLDV FROM PO2: 70 %
SODIUM BLD-SCNC: 141 MMOL/L (ref 133–144)
SODIUM SERPL-SCNC: 143 MMOL/L (ref 133–144)
SPECIMEN SOURCE: NORMAL
TROPONIN I BLD-MCNC: 0 UG/L (ref 0–0.1)
TROPONIN I SERPL-MCNC: 0.02 UG/L (ref 0–0.04)
WBC # BLD AUTO: 6.4 10E9/L (ref 4–11)

## 2017-04-02 PROCEDURE — 25000125 ZZHC RX 250: Performed by: EMERGENCY MEDICINE

## 2017-04-02 PROCEDURE — 84484 ASSAY OF TROPONIN QUANT: CPT | Performed by: EMERGENCY MEDICINE

## 2017-04-02 PROCEDURE — 84145 PROCALCITONIN (PCT): CPT | Performed by: EMERGENCY MEDICINE

## 2017-04-02 PROCEDURE — 81003 URINALYSIS AUTO W/O SCOPE: CPT | Performed by: EMERGENCY MEDICINE

## 2017-04-02 PROCEDURE — 85025 COMPLETE CBC W/AUTO DIFF WBC: CPT | Performed by: EMERGENCY MEDICINE

## 2017-04-02 PROCEDURE — 40000497 ZZHCL STATISTIC SODIUM ED POCT

## 2017-04-02 PROCEDURE — 25000125 ZZHC RX 250

## 2017-04-02 PROCEDURE — 87040 BLOOD CULTURE FOR BACTERIA: CPT | Performed by: EMERGENCY MEDICINE

## 2017-04-02 PROCEDURE — 82803 BLOOD GASES ANY COMBINATION: CPT

## 2017-04-02 PROCEDURE — 40000502 ZZHCL STATISTIC GLUCOSE ED POCT

## 2017-04-02 PROCEDURE — 25000132 ZZH RX MED GY IP 250 OP 250 PS 637: Mod: GY | Performed by: EMERGENCY MEDICINE

## 2017-04-02 PROCEDURE — 93005 ELECTROCARDIOGRAM TRACING: CPT | Performed by: EMERGENCY MEDICINE

## 2017-04-02 PROCEDURE — 82565 ASSAY OF CREATININE: CPT

## 2017-04-02 PROCEDURE — 80053 COMPREHEN METABOLIC PANEL: CPT | Performed by: EMERGENCY MEDICINE

## 2017-04-02 PROCEDURE — 83605 ASSAY OF LACTIC ACID: CPT

## 2017-04-02 PROCEDURE — 40000501 ZZHCL STATISTIC HEMATOCRIT ED POCT

## 2017-04-02 PROCEDURE — 70498 CT ANGIOGRAPHY NECK: CPT

## 2017-04-02 PROCEDURE — 71010 XR CHEST PORT 1 VW: CPT

## 2017-04-02 PROCEDURE — 83605 ASSAY OF LACTIC ACID: CPT | Performed by: EMERGENCY MEDICINE

## 2017-04-02 PROCEDURE — 99285 EMERGENCY DEPT VISIT HI MDM: CPT | Mod: 25 | Performed by: EMERGENCY MEDICINE

## 2017-04-02 PROCEDURE — 96375 TX/PRO/DX INJ NEW DRUG ADDON: CPT | Performed by: EMERGENCY MEDICINE

## 2017-04-02 PROCEDURE — 85610 PROTHROMBIN TIME: CPT | Mod: QW

## 2017-04-02 PROCEDURE — 40000739 ZZH STATISTIC STROKE CODE W/O ACCESS

## 2017-04-02 PROCEDURE — 82330 ASSAY OF CALCIUM: CPT

## 2017-04-02 PROCEDURE — 00000146 ZZHCL STATISTIC GLUCOSE BY METER IP

## 2017-04-02 PROCEDURE — 81015 MICROSCOPIC EXAM OF URINE: CPT | Performed by: EMERGENCY MEDICINE

## 2017-04-02 PROCEDURE — 40000498 ZZHCL STATISTIC POTASSIUM ED POCT

## 2017-04-02 PROCEDURE — 85610 PROTHROMBIN TIME: CPT | Performed by: EMERGENCY MEDICINE

## 2017-04-02 PROCEDURE — 87804 INFLUENZA ASSAY W/OPTIC: CPT | Performed by: EMERGENCY MEDICINE

## 2017-04-02 PROCEDURE — 84484 ASSAY OF TROPONIN QUANT: CPT

## 2017-04-02 PROCEDURE — 93010 ELECTROCARDIOGRAM REPORT: CPT | Mod: Z6 | Performed by: EMERGENCY MEDICINE

## 2017-04-02 PROCEDURE — 96361 HYDRATE IV INFUSION ADD-ON: CPT | Performed by: EMERGENCY MEDICINE

## 2017-04-02 PROCEDURE — 25000128 H RX IP 250 OP 636: Performed by: EMERGENCY MEDICINE

## 2017-04-02 PROCEDURE — 25500064 ZZH RX 255 OP 636: Performed by: EMERGENCY MEDICINE

## 2017-04-02 PROCEDURE — 83690 ASSAY OF LIPASE: CPT | Performed by: EMERGENCY MEDICINE

## 2017-04-02 PROCEDURE — 96366 THER/PROPH/DIAG IV INF ADDON: CPT | Performed by: EMERGENCY MEDICINE

## 2017-04-02 PROCEDURE — 87086 URINE CULTURE/COLONY COUNT: CPT | Performed by: EMERGENCY MEDICINE

## 2017-04-02 PROCEDURE — 96365 THER/PROPH/DIAG IV INF INIT: CPT | Performed by: EMERGENCY MEDICINE

## 2017-04-02 PROCEDURE — A9270 NON-COVERED ITEM OR SERVICE: HCPCS | Mod: GY | Performed by: EMERGENCY MEDICINE

## 2017-04-02 RX ORDER — CEFTRIAXONE 2 G/1
2 INJECTION, POWDER, FOR SOLUTION INTRAMUSCULAR; INTRAVENOUS ONCE
Status: COMPLETED | OUTPATIENT
Start: 2017-04-02 | End: 2017-04-03

## 2017-04-02 RX ORDER — ACETAMINOPHEN 500 MG
1000 TABLET ORAL ONCE
Status: COMPLETED | OUTPATIENT
Start: 2017-04-02 | End: 2017-04-02

## 2017-04-02 RX ORDER — IOPAMIDOL 755 MG/ML
75 INJECTION, SOLUTION INTRAVASCULAR ONCE
Status: COMPLETED | OUTPATIENT
Start: 2017-04-02 | End: 2017-04-02

## 2017-04-02 RX ADMIN — IOPAMIDOL 75 ML: 755 INJECTION, SOLUTION INTRAVENOUS at 22:38

## 2017-04-02 RX ADMIN — SODIUM CHLORIDE 500 ML: 9 INJECTION, SOLUTION INTRAVENOUS at 22:53

## 2017-04-02 RX ADMIN — METOROPROLOL TARTRATE 5 MG: 5 INJECTION, SOLUTION INTRAVENOUS at 21:54

## 2017-04-02 RX ADMIN — CEFTRIAXONE SODIUM 2 G: 2 INJECTION, POWDER, FOR SOLUTION INTRAMUSCULAR; INTRAVENOUS at 23:29

## 2017-04-02 RX ADMIN — SODIUM CHLORIDE, PRESERVATIVE FREE 90 ML: 5 INJECTION INTRAVENOUS at 22:39

## 2017-04-02 RX ADMIN — ACETAMINOPHEN 1000 MG: 500 TABLET, FILM COATED ORAL at 22:51

## 2017-04-02 ASSESSMENT — ENCOUNTER SYMPTOMS
SHORTNESS OF BREATH: 0
ABDOMINAL PAIN: 0
CONFUSION: 0
DIZZINESS: 1
FATIGUE: 1
FEVER: 0
WEAKNESS: 0

## 2017-04-02 NOTE — IP AVS SNAPSHOT
"          UNIT [facility]A [facility]: [phone]                                              INTERAGENCY TRANSFER FORM - LAB / IMAGING / EKG / EMG RESULTS   2017                    Hospital Admission Date: 2017  JEAN KENNEDY   : 1940  Sex: Female        Attending Provider: Lenny Greenwood MD     Allergies:  Augmentin, Codeine, Phenobarbital    Infection:  None   Service:  FAMILY MEDIC    Ht:  1.695 m (5' 6.73\")   Wt:  93.4 kg (205 lb 14.4 oz)   Admission Wt:  92.8 kg (204 lb 8 oz)    BMI:  32.51 kg/m 2   BSA:  2.1 m 2            Patient PCP Information     Provider PCP Type    Ilene Tristan MD General         Lab Results - 3 Days      Glucose by meter [[id number]] (Abnormal)  Resulted: 17 1201, Result status: Final result    Ordering provider: Meghann White MD  17 1154 Resulting lab: POINT OF CARE TEST, GLUCOSE    Specimen Information    Type Source Collected On     17 1154          Components       Value Reference Range Flag Lab   Glucose 124 70 - 99 mg/dL H 170            Glucose by meter [[id number]]  Resulted: 17 0801, Result status: Final result    Ordering provider: Meghann White MD  17 0748 Resulting lab: POINT OF CARE TEST, GLUCOSE    Specimen Information    Type Source Collected On     17 0748          Components       Value Reference Range Flag Lab   Glucose 98 70 - 99 mg/dL  170            Basic metabolic panel [[id number]] (Abnormal)  Resulted: 17 0745, Result status: Final result    Ordering provider: Beatriz Painting MD  17 0000 Resulting lab: [facility]    Specimen Information    Type Source Collected On   Blood  17 0651          Components       Value Reference Range Flag Lab   Sodium 143 133 - 144 mmol/L  51   Potassium 3.6 3.4 - 5.3 mmol/L  51   Chloride 106 94 - 109 mmol/L  51   Carbon Dioxide 27 20 - 32 mmol/L  51   Anion Gap 10 3 - 14 mmol/L  51   Glucose 91 70 - 99 " mg/dL  51   Urea Nitrogen 14 7 - 30 mg/dL  51   Creatinine 0.69 0.52 - 1.04 mg/dL  51   GFR Estimate 82 >60 mL/min/1.7m2  51   Comment:  Non  GFR Calc   GFR Estimate If Black -- >60 mL/min/1.7m2  51   Result:         >90   GFR Calc     Calcium 8.4 8.5 - 10.1 mg/dL L 51   Result:              Magnesium [[id number]]  Resulted: 04/11/17 0745, Result status: Final result    Ordering provider: Beatriz Painting MD  04/11/17 0000 Resulting lab: [facility]    Specimen Information    Type Source Collected On   Blood  04/11/17 0651          Components       Value Reference Range Flag Lab   Magnesium 2.0 1.6 - 2.3 mg/dL  51            Phosphorus [[id number]]  Resulted: 04/11/17 0745, Result status: Final result    Ordering provider: Beatriz Painting MD  04/11/17 0000 Resulting lab: [facility]    Specimen Information    Type Source Collected On   Blood  04/11/17 0651          Components       Value Reference Range Flag Lab   Phosphorus 3.4 2.5 - 4.5 mg/dL  51            Glucose by meter [[id number]] (Abnormal)  Resulted: 04/11/17 0736, Result status: Final result    Ordering provider: Meghann White MD  04/10/17 2146 Resulting lab: POINT OF CARE TEST, GLUCOSE    Specimen Information    Type Source Collected On     04/10/17 2146          Components       Value Reference Range Flag Lab   Glucose 216 70 - 99 mg/dL H 170            INR [[id number]] (Abnormal)  Resulted: 04/11/17 0729, Result status: Final result    Ordering provider: Mikey Elizabeth MD  04/11/17 0000 Resulting lab: [facility]    Specimen Information    Type Source Collected On   Blood  04/11/17 0651          Components       Value Reference Range Flag Lab   INR 2.05 0.86 - 1.14 H 51            CBC with platelets differential [[id number]] (Abnormal)  Resulted: 04/11/17 0722, Result status: Final result    Ordering  provider: Beatriz Painting MD  04/11/17 0000 Resulting lab: [facility]    Specimen Information    Type Source Collected On   Blood  04/11/17 0651          Components       Value Reference Range Flag Lab   WBC 9.1 4.0 - 11.0 10e9/L  51   RBC Count 4.60 3.8 - 5.2 10e12/L  51   Hemoglobin 9.6 11.7 - 15.7 g/dL L 51   Hematocrit 33.6 35.0 - 47.0 % L 51   MCV 73 78 - 100 fl L 51   MCH 20.9 26.5 - 33.0 pg L 51   MCHC 28.6 31.5 - 36.5 g/dL L 51   RDW 19.2 10.0 - 15.0 % H 51   Platelet Count 269 150 - 450 10e9/L  51   Diff Method Automated Method   51   % Neutrophils 70.7 %  51   % Lymphocytes 21.4 %  51   % Monocytes 7.2 %  51   % Eosinophils 0.4 %  51   % Basophils 0.0 %  51   % Immature Granulocytes 0.3 %  51   Nucleated RBCs 0 0 /100  51   Absolute Neutrophil 6.4 1.6 - 8.3 10e9/L  51   Absolute Lymphocytes 1.9 0.8 - 5.3 10e9/L  51   Absolute Monocytes 0.7 0.0 - 1.3 10e9/L  51   Absolute Eosinophils 0.0 0.0 - 0.7 10e9/L  51   Absolute Basophils 0.0 0.0 - 0.2 10e9/L  51   Abs Immature Granulocytes 0.0 0 - 0.4 10e9/L  51   Absolute Nucleated RBC 0.0   51            Blood culture [[id number]]  Resulted: 04/11/17 0452, Result status: Preliminary result    Ordering provider: Beatriz Painting MD  04/06/17 0749 Resulting lab: [facility]    Specimen Information    Type Source Collected On   Blood  04/06/17 1028          Components       Value Reference Range Flag Lab   Specimen Description Blood Right Hand   75   Culture Micro No growth after 5 days   226   Micro Report Status Pending   226            Blood culture [[id number]]  Resulted: 04/11/17 0452, Result status: Preliminary result    Ordering provider: Beatriz Painting MD  04/06/17 0749 Resulting lab: [facility]    Specimen Information    Type Source Collected On   Blood  04/06/17 1055          Components       Value Reference Range Flag Lab   Specimen Description Blood Left Hand   75   Culture Micro  No growth after 5 days   226   Micro Report Status Pending   226            Glucose by meter [[id number]] (Abnormal)  Resulted: 04/11/17 0310, Result status: Final result    Ordering provider: Meghann White MD  04/11/17 0212 Resulting lab: POINT OF CARE TEST, GLUCOSE    Specimen Information    Type Source Collected On     04/11/17 0212          Components       Value Reference Range Flag Lab   Glucose 108 70 - 99 mg/dL H 170            Glucose by meter [[id number]] (Abnormal)  Resulted: 04/10/17 1925, Result status: Final result    Ordering provider: Meghann White MD  04/10/17 1800 Resulting lab: POINT OF CARE TEST, GLUCOSE    Specimen Information    Type Source Collected On     04/10/17 1800          Components       Value Reference Range Flag Lab   Glucose 249 70 - 99 mg/dL H 170            Glucose by meter [[id number]] (Abnormal)  Resulted: 04/10/17 1231, Result status: Final result    Ordering provider: Meghann White MD  04/10/17 0829 Resulting lab: POINT OF CARE TEST, GLUCOSE    Specimen Information    Type Source Collected On     04/10/17 0829          Components       Value Reference Range Flag Lab   Glucose 115 70 - 99 mg/dL H 170            Glucose by meter [[id number]] (Abnormal)  Resulted: 04/10/17 1221, Result status: Final result    Ordering provider: Meghann White MD  04/10/17 1213 Resulting lab: POINT OF CARE TEST, GLUCOSE    Specimen Information    Type Source Collected On     04/10/17 1213          Components       Value Reference Range Flag Lab   Glucose 174 70 - 99 mg/dL H 170            Magnesium [[id number]]  Resulted: 04/10/17 0947, Result status: Final result    Ordering provider: Beatriz Painting MD  04/09/17 2200 Resulting lab: [facility]    Specimen Information    Type Source Collected On   Blood  04/10/17 0850          Components       Value Reference Range Flag Lab   Magnesium 2.2 1.6 - 2.3 mg/dL  51            Phosphorus  [[id number]] (Abnormal)  Resulted: 04/10/17 0947, Result status: Final result    Ordering provider: Beatriz Painting MD  04/09/17 2200 Resulting lab: [facility]    Specimen Information    Type Source Collected On   Blood  04/10/17 0850          Components       Value Reference Range Flag Lab   Phosphorus 2.0 2.5 - 4.5 mg/dL L 51            Basic metabolic panel [[id number]] (Abnormal)  Resulted: 04/10/17 0947, Result status: Final result    Ordering provider: Beatriz Painting MD  04/09/17 2200 Resulting lab: [facility]    Specimen Information    Type Source Collected On   Blood  04/10/17 0850          Components       Value Reference Range Flag Lab   Sodium 139 133 - 144 mmol/L  51   Potassium 3.5 3.4 - 5.3 mmol/L  51   Chloride 105 94 - 109 mmol/L  51   Carbon Dioxide 27 20 - 32 mmol/L  51   Anion Gap 7 3 - 14 mmol/L  51   Glucose 133 70 - 99 mg/dL H 51   Urea Nitrogen 14 7 - 30 mg/dL  51   Creatinine 0.65 0.52 - 1.04 mg/dL  51   GFR Estimate 88 >60 mL/min/1.7m2  51   Comment:  Non  GFR Calc   GFR Estimate If Black -- >60 mL/min/1.7m2  51   Result:         >90   GFR Calc     Calcium 8.8 8.5 - 10.1 mg/dL  51   Result:              CBC with platelets differential [[id number]] (Abnormal)  Resulted: 04/10/17 0935, Result status: Final result    Ordering provider: Beatriz Painting MD  04/09/17 2200 Resulting lab: [facility]    Specimen Information    Type Source Collected On   Blood  04/10/17 0850          Components       Value Reference Range Flag Lab   WBC 7.9 4.0 - 11.0 10e9/L  51   RBC Count 4.42 3.8 - 5.2 10e12/L  51   Hemoglobin 9.2 11.7 - 15.7 g/dL L 51   Hematocrit 32.8 35.0 - 47.0 % L 51   MCV 74 78 - 100 fl L 51   MCH 20.8 26.5 - 33.0 pg L 51   MCHC 28.0 31.5 - 36.5 g/dL L 51   RDW 19.2 10.0 - 15.0 % H 51   Platelet Count 287 150 - 450 10e9/L  51   Diff Method  Automated Method   51   % Neutrophils 73.3 %  51   % Lymphocytes 19.2 %  51   % Monocytes 7.1 %  51   % Eosinophils 0.1 %  51   % Basophils 0.0 %  51   % Immature Granulocytes 0.3 %  51   Nucleated RBCs 0 0 /100  51   Absolute Neutrophil 5.8 1.6 - 8.3 10e9/L  51   Absolute Lymphocytes 1.5 0.8 - 5.3 10e9/L  51   Absolute Monocytes 0.6 0.0 - 1.3 10e9/L  51   Absolute Eosinophils 0.0 0.0 - 0.7 10e9/L  51   Absolute Basophils 0.0 0.0 - 0.2 10e9/L  51   Abs Immature Granulocytes 0.0 0 - 0.4 10e9/L  51   Absolute Nucleated RBC 0.0   51            INR [[id number]] (Abnormal)  Resulted: 04/10/17 0929, Result status: Final result    Ordering provider: Mikey Elizabeth MD  04/10/17 0000 Resulting lab: [facility]    Specimen Information    Type Source Collected On   Blood  04/10/17 0850          Components       Value Reference Range Flag Lab   INR 2.47 0.86 - 1.14 H 51            Glucose by meter [[id number]] (Abnormal)  Resulted: 04/10/17 0235, Result status: Final result    Ordering provider: Meghann White MD  04/10/17 0148 Resulting lab: POINT OF CARE TEST, GLUCOSE    Specimen Information    Type Source Collected On     04/10/17 0148          Components       Value Reference Range Flag Lab   Glucose 194 70 - 99 mg/dL H 170            Glucose by meter [[id number]] (Abnormal)  Resulted: 04/09/17 2230, Result status: Final result    Ordering provider: Meghann White MD  04/09/17 2219 Resulting lab: POINT OF CARE TEST, GLUCOSE    Specimen Information    Type Source Collected On     04/09/17 2219          Components       Value Reference Range Flag Lab   Glucose 239 70 - 99 mg/dL H 170            Glucose by meter [[id number]] (Abnormal)  Resulted: 04/09/17 1850, Result status: Final result    Ordering provider: Meghann White MD  04/09/17 1845 Resulting lab: POINT OF CARE TEST, GLUCOSE    Specimen Information    Type Source Collected On     04/09/17 1845          Components        Value Reference Range Flag Lab   Glucose 314 70 - 99 mg/dL H 170            Glucose by meter [[id number]] (Abnormal)  Resulted: 04/09/17 1716, Result status: Final result    Ordering provider: Meghann White MD  04/09/17 1710 Resulting lab: POINT OF CARE TEST, GLUCOSE    Specimen Information    Type Source Collected On     04/09/17 1710          Components       Value Reference Range Flag Lab   Glucose 125 70 - 99 mg/dL H 170            Glucose by meter [[id number]] (Abnormal)  Resulted: 04/09/17 1201, Result status: Final result    Ordering provider: Meghann White MD  04/09/17 1157 Resulting lab: POINT OF CARE TEST, GLUCOSE    Specimen Information    Type Source Collected On     04/09/17 1157          Components       Value Reference Range Flag Lab   Glucose 130 70 - 99 mg/dL H 170            Glucose by meter [[id number]] (Abnormal)  Resulted: 04/09/17 0736, Result status: Final result    Ordering provider: Meghann White MD  04/09/17 0730 Resulting lab: POINT OF CARE TEST, GLUCOSE    Specimen Information    Type Source Collected On     04/09/17 0730          Components       Value Reference Range Flag Lab   Glucose 165 70 - 99 mg/dL H 170            CBC with platelets differential [[id number]] (Abnormal)  Resulted: 04/09/17 0711, Result status: Final result    Ordering provider: Beatriz Painting MD  04/08/17 2200 Resulting lab: [facility]    Specimen Information    Type Source Collected On   Blood  04/09/17 0514          Components       Value Reference Range Flag Lab   WBC 7.6 4.0 - 11.0 10e9/L  51   RBC Count 4.08 3.8 - 5.2 10e12/L  51   Hemoglobin 8.4 11.7 - 15.7 g/dL L 51   Hematocrit 30.5 35.0 - 47.0 % L 51   MCV 75 78 - 100 fl L 51   MCH 20.6 26.5 - 33.0 pg L 51   MCHC 27.5 31.5 - 36.5 g/dL L 51   RDW 19.2 10.0 - 15.0 % H 51   Platelet Count 233 150 - 450 10e9/L  51   Diff Method Automated Method   51   % Neutrophils 74.9 %  51   % Lymphocytes 15.8 %   51   % Monocytes 8.8 %  51   % Eosinophils 0.0 %  51   % Basophils 0.1 %  51   % Immature Granulocytes 0.4 %  51   Nucleated RBCs 0 0 /100  51   Absolute Neutrophil 5.7 1.6 - 8.3 10e9/L  51   Absolute Lymphocytes 1.2 0.8 - 5.3 10e9/L  51   Absolute Monocytes 0.7 0.0 - 1.3 10e9/L  51   Absolute Eosinophils 0.0 0.0 - 0.7 10e9/L  51   Absolute Basophils 0.0 0.0 - 0.2 10e9/L  51   Abs Immature Granulocytes 0.0 0 - 0.4 10e9/L  51   Absolute Nucleated RBC 0.0   51   Platelet Estimate Confirming automated cell count   51            Basic metabolic panel [[id number]] (Abnormal)  Resulted: 04/09/17 0703, Result status: Final result    Ordering provider: Beatriz Painting MD  04/08/17 2200 Resulting lab: [facility]    Specimen Information    Type Source Collected On   Blood  04/09/17 0514          Components       Value Reference Range Flag Lab   Sodium 142 133 - 144 mmol/L  51   Potassium 3.5 3.4 - 5.3 mmol/L  51   Chloride 104 94 - 109 mmol/L  51   Carbon Dioxide 26 20 - 32 mmol/L  51   Anion Gap 12 3 - 14 mmol/L  51   Glucose 205 70 - 99 mg/dL H 51   Urea Nitrogen 15 7 - 30 mg/dL  51   Creatinine 0.68 0.52 - 1.04 mg/dL  51   GFR Estimate 84 >60 mL/min/1.7m2  51   Comment:  Non  GFR Calc   GFR Estimate If Black -- >60 mL/min/1.7m2  51   Result:         >90   GFR Calc     Calcium 8.4 8.5 - 10.1 mg/dL L 51   Result:              INR [[id number]] (Abnormal)  Resulted: 04/09/17 0649, Result status: Final result    Ordering provider: Beatriz Painting MD  04/08/17 2200 Resulting lab: [facility]    Specimen Information    Type Source Collected On   Blood  04/09/17 0514          Components       Value Reference Range Flag Lab   INR 3.76 0.86 - 1.14 H 51            Glucose by meter [[id number]] (Abnormal)  Resulted: 04/09/17 0600, Result status: Final result    Ordering provider: Meghann White MD  04/09/17 0555 Resulting  lab: POINT OF CARE TEST, GLUCOSE    Specimen Information    Type Source Collected On     04/09/17 0555          Components       Value Reference Range Flag Lab   Glucose 199 70 - 99 mg/dL H 170            Glucose by meter [[id number]] (Abnormal)  Resulted: 04/09/17 0151, Result status: Final result    Ordering provider: Meghann White MD  04/09/17 0146 Resulting lab: POINT OF CARE TEST, GLUCOSE    Specimen Information    Type Source Collected On     04/09/17 0146          Components       Value Reference Range Flag Lab   Glucose 280 70 - 99 mg/dL H 170            Glucose by meter [[id number]] (Abnormal)  Resulted: 04/08/17 2141, Result status: Final result    Ordering provider: Meghann White MD  04/08/17 2135 Resulting lab: POINT OF CARE TEST, GLUCOSE    Specimen Information    Type Source Collected On     04/08/17 2135          Components       Value Reference Range Flag Lab   Glucose 444 70 - 99 mg/dL H 170            Glucose by meter [[id number]] (Abnormal)  Resulted: 04/08/17 1711, Result status: Final result    Ordering provider: Meghann White MD  04/08/17 1706 Resulting lab: POINT OF CARE TEST, GLUCOSE    Specimen Information    Type Source Collected On     04/08/17 1706          Components       Value Reference Range Flag Lab   Glucose 348 70 - 99 mg/dL H 170            Glucose by meter [[id number]] (Abnormal)  Resulted: 04/08/17 1226, Result status: Final result    Ordering provider: Meghann White MD  04/08/17 1222 Resulting lab: POINT OF CARE TEST, GLUCOSE    Specimen Information    Type Source Collected On     04/08/17 1222          Components       Value Reference Range Flag Lab   Glucose 320 70 - 99 mg/dL H 170            Glucose by meter [[id number]] (Abnormal)  Resulted: 04/08/17 0940, Result status: Final result    Ordering provider: Meghann White MD  04/08/17 0935 Resulting lab: POINT OF CARE TEST, GLUCOSE    Specimen Information    Type Source Collected On     04/08/17  0935          Components       Value Reference Range Flag Lab   Glucose 119 70 - 99 mg/dL H 170            Blood culture [[id number]]  Resulted: 04/08/17 0707, Result status: Final result    Ordering provider: Sole Ibarra MD  04/02/17 2159 Resulting lab: [facility]    Specimen Information    Type Source Collected On   Blood Arm, Left 04/02/17 2147          Components       Value Reference Range Flag Lab   Specimen Description Blood Left Hand   75   Culture Micro No growth   225   Micro Report Status FINAL 04/08/2017   225            Blood culture [[id number]]  Resulted: 04/08/17 0707, Result status: Final result    Ordering provider: Sole Ibarra MD  04/02/17 2159 Resulting lab: [facility]    Specimen Information    Type Source Collected On   Blood Arm, Right 04/02/17 2211          Components       Value Reference Range Flag Lab   Specimen Description Blood Right Arm   75   Culture Micro No growth   225   Micro Report Status FINAL 04/08/2017   225            Glucose by meter [[id number]] (Abnormal)  Resulted: 04/08/17 0600, Result status: Final result    Ordering provider: Meghann White MD  04/08/17 0555 Resulting lab: POINT OF CARE TEST, GLUCOSE    Specimen Information    Type Source Collected On     04/08/17 0555          Components       Value Reference Range Flag Lab   Glucose 145 70 - 99 mg/dL H 170            Procalcitonin [[id number]]  Resulted: 04/08/17 0553, Result status: Final result    Ordering provider: Beatriz Painting MD  04/07/17 2200 Resulting lab: [facility]    Specimen Information    Type Source Collected On   Blood  04/08/17 0345          Components       Value Reference Range Flag Lab   Procalcitonin 0.05 ng/ml  51   Comment:         0.05-0.24 ng/ml Low risk of systemic bacterial infection. Local bacterial   infection possible.  Recommendation: Assess other clinical features of    infection. Discourage antibiotics unless strong clinical suspicion for   serious   infection.              INR [[id number]] (Abnormal)  Resulted: 04/08/17 0510, Result status: Final result    Ordering provider: Beatriz Painting MD  04/07/17 2200 Resulting lab: [facility]    Specimen Information    Type Source Collected On   Blood  04/08/17 0345          Components       Value Reference Range Flag Lab   INR 3.09 0.86 - 1.14 H 51            Magnesium [[id number]]  Resulted: 04/08/17 0452, Result status: Final result    Ordering provider: Beatriz Painting MD  04/07/17 2200 Resulting lab: [facility]    Specimen Information    Type Source Collected On   Blood  04/08/17 0345          Components       Value Reference Range Flag Lab   Magnesium 2.3 1.6 - 2.3 mg/dL  51            Basic metabolic panel [[id number]] (Abnormal)  Resulted: 04/08/17 0452, Result status: Final result    Ordering provider: Beatriz Painting MD  04/07/17 2200 Resulting lab: [facility]    Specimen Information    Type Source Collected On   Blood  04/08/17 0345          Components       Value Reference Range Flag Lab   Sodium 143 133 - 144 mmol/L  51   Potassium 4.0 3.4 - 5.3 mmol/L  51   Chloride 107 94 - 109 mmol/L  51   Carbon Dioxide 24 20 - 32 mmol/L  51   Anion Gap 12 3 - 14 mmol/L  51   Glucose 138 70 - 99 mg/dL H 51   Urea Nitrogen 14 7 - 30 mg/dL  51   Creatinine 0.55 0.52 - 1.04 mg/dL  51   GFR Estimate -- >60 mL/min/1.7m2  51   Result:         >90  Non  GFR Calc     GFR Estimate If Black -- >60 mL/min/1.7m2  51   Result:         >90   GFR Calc     Calcium 8.1 8.5 - 10.1 mg/dL L 51   Result:              CBC with platelets differential [[id number]] (Abnormal)  Resulted: 04/08/17 0448, Result status: Final result    Ordering provider: Beatriz Painting MD  04/07/17 2200 Resulting lab: [place]  [facility]    Specimen Information    Type Source Collected On   Blood  04/08/17 4165          Components       Value Reference Range Flag Lab   WBC 6.8 4.0 - 11.0 10e9/L  51   RBC Count 3.88 3.8 - 5.2 10e12/L  51   Hemoglobin 8.1 11.7 - 15.7 g/dL L 51   Hematocrit 28.0 35.0 - 47.0 % L 51   MCV 72 78 - 100 fl L 51   MCH 20.9 26.5 - 33.0 pg L 51   MCHC 28.9 31.5 - 36.5 g/dL L 51   RDW 19.0 10.0 - 15.0 % H 51   Platelet Count 116 150 - 450 10e9/L L 51   Diff Method Automated Method   51   % Neutrophils 78.2 %  51   % Lymphocytes 14.4 %  51   % Monocytes 6.5 %  51   % Eosinophils 0.0 %  51   % Basophils 0.3 %  51   % Immature Granulocytes 0.6 %  51   Nucleated RBCs 0 0 /100  51   Absolute Neutrophil 5.3 1.6 - 8.3 10e9/L  51   Absolute Lymphocytes 1.0 0.8 - 5.3 10e9/L  51   Absolute Monocytes 0.4 0.0 - 1.3 10e9/L  51   Absolute Eosinophils 0.0 0.0 - 0.7 10e9/L  51   Absolute Basophils 0.0 0.0 - 0.2 10e9/L  51   Abs Immature Granulocytes 0.0 0 - 0.4 10e9/L  51   Absolute Nucleated RBC 0.0   51            Glucose by meter [[id number]] (Abnormal)  Resulted: 04/08/17 0420, Result status: Final result    Ordering provider: Meghann White MD  04/08/17 0415 Resulting lab: POINT OF CARE TEST, GLUCOSE    Specimen Information    Type Source Collected On     04/08/17 0415          Components       Value Reference Range Flag Lab   Glucose 150 70 - 99 mg/dL H 170            Testing Performed By     Lab - Abbreviation Name Director Address Valid Date Range    51 - Unknown [facility] Unknown [address] [facility] [facility] [address] 12/31/14 1010 - Present    75 - Unknown [facility] Unknown [address] [facility] [address] 01/15/15 1019 - Present    170 - Unknown POINT OF CARE TEST, GLUCOSE Unknown Unknown 10/31/11 1114 - Present    225 - Unknown [facility] Unknown [address]  [place] [address] 12/19/14  "0954 - Present    226 - Unknown MICRO RAPID TESTING LAB Unknown [address] [facility] [address] 12/19/14 0955 - Present            Unresulted Labs (24h ago through future)    Start       Ordered    04/04/17 0600  INR  (warfarin (COUMADIN) Pharmacy Consult-INITIAL ORDER)  DAILY,   Routine      04/03/17 0353    04/04/17 0600  INR  (warfarin (COUMADIN) Pharmacy Consult-INITIAL ORDER)  DAILY,   Routine      04/03/17 0638    Unscheduled  Glucose - Diabetes  CONDITIONAL X 1 STAT,   STAT     Comments:  for changes in mental status, diaphoresis, or unexplained tachycardia    04/07/17 1711    Unscheduled  Potassium  (Potassium Replacement - \"Standard\" - For K levels less than 3.4 mmol/L - UU,UR,UA,RH,SH,PH,WY )  CONDITIONAL (SPECIFY),   Routine     Comments:  Obtain Potassium Level for these conditions:  *IF no potassium result within 24 hours before initiation of order set, draw potassium level with next lab collect.    *2 HOURS AFTER last IV potassium replacement dose and 4 hours after an oral replacement dose.  *Next morning after potassium dose.     Repeat Potassium Replacement if necessary.    04/10/17 1400    Unscheduled  Magnesium  (Magnesium Replacement -  Adult - \"Standard\" - Replacement for all levels less than 1.6 mg/dL )  CONDITIONAL (SPECIFY),   Routine     Comments:  Obtain Magnesium Level for these conditions:  *IF no magnesium result within 24 hrs before initiation of order set, draw magnesium level with next lab collect.    *2 HOURS AFTER last magnesium replacement dose when magnesium replacement given for level less than 1.6   *Next morning after magnesium dose.     Repeat Magnesium Replacement if necessary.    04/10/17 1400    Unscheduled  Phosphorus  (POTASSIUM Phosphate - \"Standard\" - Replacement for levels less than or equal to 2.4 mg/dL )  CONDITIONAL (SPECIFY),   Routine     Comments:  Obtain Phosphorus Level for these conditions:  *IF no phosphorus result within 24 hrs before initiation of " order set, draw phosphorus level with next lab collect.    *2 HOURS AFTER last phosphorus replacement dose for levels less than 2.0.  *Next morning after phosphorus dose.     Repeat Phosphorus Replacement if necessary.    04/10/17 1400         Imaging Results - 3 Days      XR Chest Port 1 View [[id number]]  Resulted: 17 1119, Result status: Final result    Ordering provider: Coty Moreno MD  17 0826 Resulted by: Augustine Shields MD Craig, Paul Grant, MD    Performed: 17 0850 - 17 0907 Resulting lab: RADIOLOGY RESULTS    Narrative:       EXAM: XR CHEST PORT 1 VW  2017 9:07 AM      HISTORY: reevaluation of pulmonary edema    COMPARISON: 2017    FINDINGS: Cardiac silhouette remains obscured . Worsening perihilar  opacities, left greater than right. Small left pleural effusion. No  pneumothorax.      Impression:       IMPRESSION: Worsening perihilar opacities, left greater right.  Findings likely indicate worsening pulmonary edema.    I have personally reviewed the examination and initial interpretation  and I agree with the findings.    AUGUSTINE SHIELDS MD      Testing Performed By     Lab - Abbreviation Name Director Address Valid Date Range    104 - Rad Rslts RADIOLOGY RESULTS Unknown Unknown 05 1553 - Present               ECG/EMG Results      ECHO COMPLETE WITH OPTISON [[id number]]  Resulted: 17 0958, Result status: Edited Result - FINAL    Ordering provider: Coty Moreno MD  17 0845 Resulted by: Ramin Raymond MD    Performed: 17 1025 - 17 1032 Resulting lab: RADIOLOGY RESULTS    Narrative:       [id number]  [id number]  [id number]  [id number]^ROBBIN^COTY^           [facility],[place]  Echocardiography Laboratory  [address] [place] [id number]     Name: JEAN KENNEDY  MRN: [id number]  : 1940  Study Date: 2017 09:58 AM  Age: 76 yrs  Gender: Female  Patient Location: [facility]  Reason  For Study: Afib  Ordering Physician: PARK SIEGEL  Performed By: PRADEEP Carranza LEONEL De La Rosaon     BSA: 2.0 m2  Height: 66 in  Weight: 206 lb  BP: 127/73 mmHg  _____________________________________________________________________________  __        Procedure  Echocardiogram with two-dimensional, color and spectral Doppler performed.  Contrast Optison. Optison (NDC #1202-4352-07) given intravenously. Patient was  given 6.0 ml mixture of 3 ml Optison and 6 ml saline. 3.0 ml wasted. The  patient's rhythm is atrial fibrillation.  _____________________________________________________________________________  __        Interpretation Summary  Left ventricular function is normal. The visually estimated EF is 60-65%.  There is basal to mid anteroseptal akinesis.  Right ventricular function, chamber size, wall motion, and thickness are  normal.  No significant valvular abnormalities.  The inferior vena cava was normal in size with preserved respiratory  variability.  This study was compared with the study from 7/3/2016 . The basal to mid  anteroseptal akinesis is new.  _____________________________________________________________________________  __        Left Ventricle  Left ventricular function is normal.The EF is 60-65%. Left ventricular size is  normal. Mild concentric wall thickening consistent with left ventricular  hypertrophy is present. Diastolic function not assessed due to atrial  fibrillation. Basal to mid anteroseptal akinesis.     Right Ventricle  Right ventricular function, chamber size, wall motion, and thickness are  normal. TAPSE 1.7cm. S' velocity 9cm/s.     Atria  Both atria appear normal.        Mitral Valve  Trace to mild mitral insufficiency is present.     Aortic Valve  Mild aortic valve sclerosis is present.     Tricuspid Valve  Trace tricuspid insufficiency is present. The peak velocity of the tricuspid  regurgitant jet is not obtainable. Pulmonary artery systolic pressure cannot  be assessed.      Pulmonic Valve  The pulmonic valve is normal.     Vessels  The aorta root is normal. The thoracic aorta is normal. The inferior vena cava  was normal in size with preserved respiratory variability. Estimated right  atrial pressure is < 5 mmHg.     Pericardium  Trivial pericardial effusion is present.        Compared to Previous Study  This study was compared with the study from 7/3/2016 . The basal to mid  anteroseptal akinesis is new.     Attestation  I have personally viewed the imaging and agree with the interpretation and  report as documented by the fellow, Dr. Ferguson, and/or edited by me.  _____________________________________________________________________________  __     MMode/2D Measurements & Calculations  RVDd: 3.6 cm  IVSd: 1.1 cm  LVIDd: 4.8 cm  LVIDs: 3.1 cm  LVPWd: 1.2 cm  FS: 36.4 %  EDV(Teich): 108.5 ml  ESV(Teich): 36.9 ml  LV mass(C)d: 207.3 grams  Ao root diam: 3.2 cm  asc Aorta Diam: 3.4 cm  LA/Ao: 1.3  LVOT diam: 1.9 cm  LVOT area: 2.9 cm2  LA Volume (BP): 69.8 ml  TAPSE: 1.7 cm        Doppler Measurements & Calculations  MV E max neeta: 116.1 cm/sec              _____________________________________________________________________________  __           Report approved by: Jd Daugherty 04/06/2017 12:23 PM       1    Type Source Collected On     04/06/17 0958          View Image (below)        Echocardiogram Complete [[id number]]  Resulted: 04/06/17 1026, Result status: In process    Ordering provider: Coty Moreno MD  04/06/17 0845 Performed: 04/06/17 1025 - 04/06/17 1025    Resulting lab: [facility]                   Encounter-Level Documents:     There are no encounter-level documents.      Order-Level Documents:     There are no order-level documents.

## 2017-04-02 NOTE — IP AVS SNAPSHOT
` `     [facility]: [phone]                 INTERAGENCY TRANSFER FORM - NOTES (H&P, Discharge Summary, Consults, Procedures, Therapies)   2017                    Hospital Admission Date: 2017  BETTY KENNEDY   : 1940  Sex: Female        Patient PCP Information     Provider PCP Type    Ilene Tristan MD General         History & Physicals      H&P by Sandra Jones MD at 4/3/2017 12:50 AM     Author:  Sandra Jones MD Service:  Family Medicine Author Type:  Resident    Filed:  4/3/2017  2:51 PM Date of Service:  4/3/2017 12:50 AM Note Created:  4/3/2017  1:30 AM    Status:  Attested :  Sandra Jones MD (Resident)    Cosigner:  Abhishek Elizabeth MD at 4/3/2017  4:51 PM        Attestation signed by Abhishek Elizabeth MD at 4/3/2017  4:51 PM        Attestation:  This patient has been seen and evaluated by Abhishek loza on 4/3/2017.  I saw and discussed the case with the resident Dr Jones and the care team. I agree with the findings and plan in this note. I have reviewed today's vital signs, medications, laboratory results and imaging results.   Code for today's visit :78684 Inpatient admission High complexity/severity.  The patient's condition meets inpatient criteria because of the following diagnoses and severity indicators : Acute respiratory failure secondary to CAP.  Abhishek Elizabeth MD  [facility]                                     [YT1.1]                                                 Please see day team addendum at the bottom[KC1.1]    [facility]  Inpatient History and Physical    Betty Kennedy MRN# [id number]   Age: 76 year old YOB: 1940     4/3/2017 12:50 AM    Primary care provider: Ilene Tristan            Chief Concern:   Dizziness, and concern for AMS       History is obtained from the patient, and roommate in the room.          History of Present Illness (Resident / Clinician):    Betty Tee is a 76 year old female with a history of Sjogren's Syndrome, interstitial lung disease, DM type II, HFpEF, DVT on Coumadin, left ventricular hypertrophy, afib on Metoprolol, and HTN who presents to the ED after her niece call EMS due to concern for AMS, and dizziness.    Brianna is a [employer]. Her roommate[YT1.1] was[YT1.2] concern after she got home and found her in the bathroom with all the lights of the house off which is not their usual routine. Her roommate noticed that she was not speaking normally, with delayed response in conversation. Denies slurred/garbled speech. Her roommate call her neighbor, and her niece who also noticed changes in her speech reaction, and decided to call an ambulance.   Also, pt mentioned that she had been feeling more tired, and sleepy[YT1.1] more[YT1.2] than usual lately, especially yesterday to the point that she couldn't participated at her commitments for the day.  Few days ago she was in contact with a friend who was coughing, and feeling body aches, and believes that she may gotten from her.  She reports significant persistent cough, productive of whitish sputum for the last few days which occasionally caused her to vomiting a couple of times. Today she expended most of her day in bed, was also feeling dizzy, and diffuse upper abdominal pain associated with cough. She didn't exactly remember why she was at the bathroom with the lights off.     Denies any loss of speech comprehension, weakness, numbness, dyarthria, vision changes, chest pain, or headache. Didn't check her temp at home, but had been experiencing chills, and sweating. Her temp in the ED was 102.6F.     EMS did a EKG that showed RVR, which triggered STEMI alert. Cardiology was consulted by ED physician, MI ruled out and Echo was ordered.  Stroke team also consulted, head CT/CTA without acute abnormalities. No concerns for cerebrovascular pathology.    In ED:  azithromycin (ZITHROMAX) 500 mg IV given    metoprolol (LOPRESSOR) injection 5 mg (5 mg Intravenous Given 4/2/17 1503)   0.9% sodium chloride BOLUS    acetaminophen (TYLENOL) tablet 1,000 mg (1,000 mg Oral Given 4/2/17 2251)   cefTRIAXone (ROCEPHIN) 2 g IV given     Old records were reviewed, and any additions to pertinent history are noted above.          Assessment and Plan:   Assessment:   Betty Tee is a 76 year old who presents for evaluation of persistent cough, dizziness, and concerns for AMS now resolved; and found to have a possible resp infection on CXR.  Patient Active Problem List   Diagnosis     Temporomandibular joint disorder     Diverticulitis of colon     Disorder of bone and cartilage     Osteoarthritis     Alcohol abuse, in remission     Restless legs syndrome (RLS)     Major depressive disorder, recurrent episode, moderate     Essential hypertension with goal blood pressure less than 140/90     CARDIOVASCULAR SCREENING; LDL GOAL LESS THAN 130     Sciatica     Advanced directives, counseling/discussion     Colouterine fistula     Atrial fibrillation with controlled ventricular response (H)     Left ventricular hypertrophy     Health Care Home     Insomnia     Joint pains     Allergic reaction caused by a drug - likely plaquenil     Breast fibroadenoma     DVT (deep venous thrombosis) (H)     Fatty liver disease, nonalcoholic     Rib pain     Neck mass     Gastroesophageal reflux disease without esophagitis     Enlarged lymph node     Sjogren's syndrome (H)     ANGELICA (obstructive sleep apnea)     ILD (interstitial lung disease) (H)     Lower GI bleed     Acute and chronic respiratory failure with hypoxia (H)     Acute edema of lung (H)     (HFpEF) heart failure with preserved ejection fraction (H)     Type 2 diabetes mellitus without complication, without long-term current use of insulin (H)         Plan:   ## SIRS- meet criteria on admission (fever, tachycardia), now resolved.[YT1.1]  ## Acute hypoxic respiratory failure- At home-4  LPM with activity only, now on 2 LPM at rest.[YT1.2]  ## ILD- (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 Also had lung nodules[YT1.3]) Follows up w/ Dr. Walsh- Last seen on[YT1.1] 3/22/17- Stable.[YT1.3]  ## CAP- Recent increase of cough, malaise, and CXR showing mild streaky opacities in the right lung base of infection or atelectasis.  - Stared on Azithromycin, and Ceftriaxone.  - Cont. O2, now at 2 LPM   - Prednisone increased to 30 mg/day (stress dose)[YT1.1]- (Home dose: 10 mg daily)[YT1.3]  - Cont home montelukast  - Cont home Flovent  - Duoneb   - Albuterol neb prn    ## Dizziness/AMS- resolved  Likely due to infection, vs A Fib w/ RVR. MI, and stroke ruled out in ED. Pt also has Hx of alcohol abuse, but had been in remission for 30 years. Unlikely medications.  - Will monitor    ## A Fib.  ## HFpEF  ## HTN   RVR w/ , ST elevation in aVR, ST depressions in V4-V6 on admission EKG (no significant changes from previous)[YT1.1], eliz troponin x2.[YT1.3]  Good response to B-blocker given in ED.  - On Tele[YT1.1]  - Pharmacy to dose warfarin (INR on admission 1.79)[YT1.3]  - Holding furosemide, and spironolactone due to soft BPs on admission.[YT1.1]   - Cont metoprolol 100 mg daily.[YT1.2]  - Will monitor    ## Diabetes Mellitus- uncontrolled  Hgb A1c 11.6 on 4/2/17    Home regimen:  - Metformin 1000 mg daily    Hosp regimen:  - holding metformin  - On[YT1.1] L[YT1.4]SI   - Will monitor BG, and adjust treatment as needed.    Daily cares -   F:[YT1.1]oral[YT1.3]  E:[YT1.1]K re[YT1.2]placement per protocol.[YT1.4]  N:[YT1.1]regular diet[YT1.3]  Lines:[YT1.1]PIV[YT1.3]  Activity:[YT1.1]-Up as tolerated[YT1.3]  CODE:[YT1.1]Full Code[YT1.3]  Prophylaxis:[YT1.1]On warfarin, and Lovenox (prophylactic)[YT1.2]  PCP communication:  Ilene Mccann    Dispo: Expected discharge date[YT1.1] 2-3 days pending on clinical improvement.      Discussed with faculty but not examined by  faculty.[YT1.3]           Past Medical History:     Past Medical History:   Diagnosis Date     Alcohol abuse, in remission      Allergic rhinitis, cause unspecified     allegra helps when she takes it     Antiplatelet or antithrombotic long-term use      Atrial fibrillation (H)     in hosp in 11/11 after surgery w/ fluid overload     Cardiomegaly     LVH on stress echo- cardiac w/u at [facility]- neg CT scan for PE, neg stress echo in 8/06     Chest pain, unspecified      Disorder of bone and cartilage, unspecified     osteopenia (had been on prempro), improved on 6/06 dexa, stable dexa 11/10     Diverticulosis of colon (without mention of hemorrhage)     last episode yrs ago     Essential hypertension, benign      Gastro-oesophageal reflux disease      Insomnia, unspecified     weaned off clonazepam     Irregular heart beat      Lumbago 7/09    MRI with DJD, now seeing Dr. Cain for sciatic sx's     Major depressive disorder, recurrent episode, moderate (H)      Obstructive sleep apnea      Osteoarthrosis, unspecified whether generalized or localized, unspecified site      Sjogren's syndrome (H)      Sleep apnea      Tobacco use disorder     chantix in 9/07, started again in 6/08, working             Past Surgical History:     Past Surgical History:   Procedure Laterality Date     BACK SURGERY  1962     BIOPSY BREAST  9/27/02    Biopsy Left Breast     C APPENDECTOMY  1970's?     C NONSPECIFIC PROCEDURE  11/05    exploratory abd lap, adhesions, resolved RLQ pain, diverticulitis episodes     CARDIAC SURGERY       CHOLECYSTECTOMY  1990's?     COLECTOMY LEFT  11/7/2011    Procedure:COLECTOMY LEFT; Laparoscopic mobilization of splenic flexture, sigmoid colectomy, coloprotoscopy, loop illeostomy; Surgeon:CK CASTANEDA; Location:[facility]     HYSTERECTOMY TOTAL ABDOMINAL, BILATERAL SALPINGO-OOPHORECTOMY, COMBINED  11/7/2011    Procedure:COMBINED HYSTERECTOMY TOTAL ABDOMINAL, BILATERAL SALPINGO-OOPHORECTOMY; total abdominal  hysterectomy, bilateral salpingo-oophorectomy; Surgeon:ALETA MANUEL; Location:[facility] OR     INSERT STENT URETER  11/7/2011    Procedure:INSERT STENT URETER; Placement of Bilateral Ureteral Stents ; Surgeon:PRANEETH BRYANT; Location:[facility] OR     SIGMOIDOSCOPY FLEXIBLE  11/3/2011    Procedure:SIGMOIDOSCOPY FLEXIBLE; Flexible Sigmoidoscopy; Surgeon:CK CASTANEDA; Location:[facility]     TAKEDOWN ILEOSTOMY  2/1/2012    Procedure:TAKEDOWN ILEOSTOMY; Takedown Loop Ileostomy ; Surgeon:CK CASTANEDA; Location:[facility] OR             Social History:[YT1.1]   I have reviewed this patient's social history[YT1.3]   Social History     Social History     Marital status: Single     Spouse name: N/A     Number of children: 0     Years of education: Ed Spec De     Occupational History     Professor [employer]     Social History Main Topics     Smoking status: Former Smoker     Packs/day: 0.50     Years: 10.00     Types: Cigarettes     Quit date: 8/1/2011     Smokeless tobacco: Never Used      Comment: 1/2 ppd     Alcohol use No      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     Drug use: No     Sexual activity: No     Other Topics Concern     Not on file     Social History Narrative    Social Documentation:        Balanced Diet: YES    Calcium intake: 1-2 per day    Caffeine: 4-5 cups per day    Exercise:  type of activity limited right now due to foot injury    Sunscreen: No    Seatbelts:  Yes    Self Breast Exam:  Yes    Self Testicular Exam: n/a    Physical/Emotional/Sexual Abuse: No    Do you feel safe in your environment? No-pt lives in [facility]        Cholesterol screen up to date: No-will check today    Eye Exam up to date: Yes    Dental Exam up to date: Yes    Pap smear up to date: Does Not Apply    Mammogram up to date: Yes-10/07    Dexa Scan up to date: Yes-2006    Colonoscopy up to date: Yes-2006    Immunizations up to date: Yes-td 2002    Glucose screen if over 40:   Yes    '09[YT1.5]                              Family History:[YT1.1]   I have reviewed this patient's family history[YT1.3]       Family History   Problem Relation Age of Onset     C.A.D. Mother 63     MI- first at age 63     C.A.D. Sister 52     Minor MI- age 50's     HEART DISEASE Mother      HEART DISEASE Sister      Hypertension Mother      Hypertension Sister      Hypertension Sister      Hypertension Brother      CEREBROVASCULAR DISEASE Mother      Cancer - colorectal Sister 48     Late 40's early 50's     Prostate Cancer Brother 74     Dx'd age 74     Alcohol/Drug Father      Alzheimer Disease Father      GASTROINTESTINAL DISEASE Sister      Diverticulitis     GASTROINTESTINAL DISEASE Brother      Diverticulitis     Lipids Sister      Lipids Sister      Parkinsonism Brother      DIABETES Sister      HEART DISEASE Sister      CHF     CANCER Sister      lung, smoker     Substance Abuse Sister      Substance Abuse Brother      Dementia Father      Asthma Sister      CANCER Sister      Substance Abuse Sister      Substance Abuse Brother      Asthma Sister      Hypertension Father      Breast Cancer Daughter      Prostate Cancer Brother      Hyperlipidemia Mother      Hyperlipidemia Father      Hyperlipidemia Brother[YT1.5]           Immunizations:[YT1.1]     Immunization History   Administered Date(s) Administered     Influenza (High Dose) 3 valent vaccine 10/11/2013, 10/14/2014, 12/31/2015, 11/11/2016     Influenza (IIV3) 11/24/2003, 10/08/2004, 10/28/2005, 11/01/2006, 09/01/2010, 10/17/2011, 09/21/2012     Pneumococcal (PCV 13) 10/30/2015     Pneumococcal 23 valent 11/03/2010     TD (ADULT, 7+) 01/15/1993, 11/05/2002     TDAP Vaccine (Adacel) 11/03/2010     Zoster vaccine, live 10/05/2009[YT1.6]            Allergies:   Augmentin; Codeine; and Phenobarbital          Medications:     No current facility-administered medications on file prior to encounter.   Current Outpatient Prescriptions on File Prior to  Encounter:  metFORMIN (GLUCOPHAGE-XR) 500 MG 24 hr tablet Take 2 tablets (1,000 mg) by mouth daily (with dinner)   order for DME Oxygen: Patient requires supplemental Oxygen 4 LPM via nasal canula with activity. Please provide patient with POC to improve mobility, okay to titrate for conserving to keep stats above 90%. Please fax results to [phone].  Oxygen will be for a lifetime.   pantoprazole (PROTONIX) 20 MG EC tablet Take 1-2 tablets (20-40 mg) by mouth daily   PARoxetine (PAXIL) 10 MG tablet TAKE 1 TABLET (10mg) BY MOUTH AT BEDTIME   traZODone (DESYREL) 50 MG tablet TAKE 1/2-1 TABLET BY MOUTH NIGHTLY AS NEEDED, CAN TITRATE DOWN TO 1/2TAB OR UP TO 2TABS AS NEEDED   ketoconazole (NIZORAL) 2 % cream Apply topically 2 times daily   predniSONE (DELTASONE) 10 MG tablet Take 1 tablet (10 mg) by mouth daily   furosemide (LASIX) 40 MG tablet Take 1 tablet (40 mg) by mouth 2 times daily   metoprolol (TOPROL-XL) 100 MG 24 hr tablet Take 1 tablet (100 mg) by mouth daily   spironolactone (ALDACTONE) 25 MG tablet Take 1 tablet (25 mg) by mouth daily   montelukast (SINGULAIR) 10 MG tablet Take 1 tablet (10 mg) by mouth At Bedtime   albuterol (PROAIR HFA, PROVENTIL HFA, VENTOLIN HFA) 108 (90 BASE) MCG/ACT inhaler Inhale 2 puffs into the lungs every 6 hours   order for DME Oxygen: Patient requires supplemental Oxygen 4 LPM via nasal canula with activity. Please provide patient with a home unit and with portability capability. Oxygen will be for a lifetime.   azithromycin (ZITHROMAX) 250 MG tablet Take 1 tablet (250 mg) by mouth daily   polyethylene glycol (MIRALAX/GLYCOLAX) packet Take 17 g by mouth daily as needed for constipation   warfarin (COUMADIN) 7.5 MG tablet Current dose is 7.5 mg daily.  Dose adjusted per INR result.   acyclovir (ZOVIRAX) 5 % ointment Apply topically 6 times daily (Patient taking differently: Apply topically 6 times daily As needed for outbreaks)   fluticasone (FLOVENT HFA) 220 MCG/ACT inhaler  Inhale 2 puffs into the lungs 2 times daily   acyclovir (ZOVIRAX) 400 MG tablet Take 400 mg by mouth See Admin Instructions 5 times daily as needed for outbreaks   fluticasone (FLONASE) 50 MCG/ACT nasal spray Spray 1-2 sprays into both nostrils daily (Patient taking differently: Spray 1-2 sprays into both nostrils daily as needed for allergies )   COMPRESSION STOCKINGS Wear compression stockings in affected leg (right leg) or both legs most of the time during the day and take them off at night.   acetaminophen (TYLENOL) 500 MG tablet Take 500 mg by mouth nightly as needed             Review of Systems:[YT1.1]   Constitutional: Positive for fatigue. Negative for fever.   Respiratory: Negative for shortness of breath. Positive for cough  Cardiovascular: Negative for chest pain.   Gastrointestinal: Positive for abdominal pain with cough. Negative for N/V/D   Neurological: Positive for dizziness at home. Negative for weakness, vision changes, or headache.   Psychiatric/Behavioral: Negative for confusion.   Skin: Negative for rash or lesions.[YT1.2]           Physical Exam:[YT1.1]   Vitals were reviewed[YT1.3]  Temp: 98.7  F (37.1  C) Temp src: Oral BP: 113/64 Pulse: 97 Heart Rate: 97 Resp: 18 SpO2: 97 % O2 Device: Nasal cannula Oxygen Delivery: 2 LPM[YT1.6]   General: Comfortable in bed, NAD  HEENT: normocephalic, atraumatic  Resp: No resp distress.[YT1.3] Diffuse wheezing bilaterally. No crackles.[YT1.4]  CV: Irregularly irregular[YT1.3]. No murmur.   Abdomen: Soft, normal BS, mildly TTP in upper abdomen, no guarding or rebound, no mass.  Back: No CVA tenderness. No deformity.  Neuro: Alert, oriented x3, clear speech. Cranial nerve grossly intact. No motor deficit.[YT1.2]   Extremities: peripheral pulses palpable[YT1.3]. No edema.   MSK: Normal ROM. TTP bilaterally.[YT1.2]  Skin: intact[YT1.3]   Psych: Normal mood and affect.[YT1.2]         Data:     Results for orders placed or performed during the hospital encounter  of 04/02/17 (from the past 24 hour(s))   CBC with platelets differential   Result Value Ref Range    WBC 6.4 4.0 - 11.0 10e9/L    RBC Count 4.66 3.8 - 5.2 10e12/L    Hemoglobin 9.9 (L) 11.7 - 15.7 g/dL    Hematocrit 34.6 (L) 35.0 - 47.0 %    MCV 74 (L) 78 - 100 fl    MCH 21.2 (L) 26.5 - 33.0 pg    MCHC 28.6 (L) 31.5 - 36.5 g/dL    RDW 18.6 (H) 10.0 - 15.0 %    Platelet Count 218 150 - 450 10e9/L    Diff Method Automated Method     % Neutrophils 71.3 %    % Lymphocytes 14.4 %    % Monocytes 11.9 %    % Eosinophils 1.4 %    % Basophils 0.5 %    % Immature Granulocytes 0.5 %    Nucleated RBCs 0 0 /100    Absolute Neutrophil 4.5 1.6 - 8.3 10e9/L    Absolute Lymphocytes 0.9 0.8 - 5.3 10e9/L    Absolute Monocytes 0.8 0.0 - 1.3 10e9/L    Absolute Eosinophils 0.1 0.0 - 0.7 10e9/L    Absolute Basophils 0.0 0.0 - 0.2 10e9/L    Abs Immature Granulocytes 0.0 0 - 0.4 10e9/L    Absolute Nucleated RBC 0.0    INR   Result Value Ref Range    INR 1.79 (H) 0.86 - 1.14   Comprehensive metabolic panel   Result Value Ref Range    Sodium 143 133 - 144 mmol/L    Potassium 3.3 (L) 3.4 - 5.3 mmol/L    Chloride 108 94 - 109 mmol/L    Carbon Dioxide 23 20 - 32 mmol/L    Anion Gap 12 3 - 14 mmol/L    Glucose 213 (H) 70 - 99 mg/dL    Urea Nitrogen 7 7 - 30 mg/dL    Creatinine 0.58 0.52 - 1.04 mg/dL    GFR Estimate >90  Non  GFR Calc   >60 mL/min/1.7m2    GFR Estimate If Black >90   GFR Calc   >60 mL/min/1.7m2    Calcium 8.2 (L) 8.5 - 10.1 mg/dL    Bilirubin Total 0.6 0.2 - 1.3 mg/dL    Albumin 3.0 (L) 3.4 - 5.0 g/dL    Protein Total 7.0 6.8 - 8.8 g/dL    Alkaline Phosphatase 72 40 - 150 U/L    ALT 19 0 - 50 U/L    AST 29 0 - 45 U/L   Lipase   Result Value Ref Range    Lipase 136 73 - 393 U/L   Lactic acid whole blood   Result Value Ref Range    Lactic Acid 1.6 0.7 - 2.1 mmol/L   Troponin I   Result Value Ref Range    Troponin I ES 0.020 0.000 - 0.045 ug/L   Blood culture   Result Value Ref Range    Specimen  Description Blood Left Hand     Culture Micro No growth after 2 hours     Micro Report Status Pending    Troponin POCT   Result Value Ref Range    Troponin I 0.00 0.00 - 0.10 ug/L   Glucose by meter   Result Value Ref Range    Glucose 228 (H) 70 - 99 mg/dL   ISTAT gases elec ica gluc thad POCT   Result Value Ref Range    Ph Venous 7.46 (H) 7.32 - 7.43 pH    PCO2 Venous 35 (L) 40 - 50 mm Hg    PO2 Venous 33 25 - 47 mm Hg    Bicarbonate Venous 24 21 - 28 mmol/L    O2 Sat Venous 68 %    Sodium 141 133 - 144 mmol/L    Potassium 3.4 3.4 - 5.3 mmol/L    Glucose 234 (H) 70 - 99 mg/dL    Calcium Ionized 4.6 4.4 - 5.2 mg/dL    Hemoglobin 11.6 (L) 11.7 - 15.7 g/dL    Hematocrit - POCT 34 (L) 35.0 - 47.0 %PCV   Chest  XR, 1 view portable    Narrative    EXAM: XR CHEST PORT 1 VW  4/2/2017 9:59 PM      HISTORY: [facility]    COMPARISON: CT 3/22/2017, chest x-ray 8/1/2016    FINDINGS: Cardiac silhouette is enlarged, stable. Mild streaky medial  right basilar opacities. No pleural effusion or pneumothorax.       Impression    IMPRESSION: Mild streaky opacities in the right lung base of infection  or atelectasis.    I have personally reviewed the examination and initial interpretation  and I agree with the findings.    HOMAR SHIELDS MD   ISTAT gases lactate thad POCT   Result Value Ref Range    Ph Venous 7.46 (H) 7.32 - 7.43 pH    PCO2 Venous 36 (L) 40 - 50 mm Hg    PO2 Venous 35 25 - 47 mm Hg    Bicarbonate Venous 26 21 - 28 mmol/L    O2 Sat Venous 70 %    Lactic Acid 1.5 0.7 - 2.1 mmol/L   INR point of care   Result Value Ref Range    INR Point of Care 1.3 (H) 0.86 - 1.14   Blood culture   Result Value Ref Range    Specimen Description Blood Right Arm     Culture Micro No growth after 2 hours     Micro Report Status Pending    Creatinine POCT   Result Value Ref Range    Creatinine 0.6 0.52 - 1.04 mg/dL    GFR Estimate >90 >60 mL/min/1.7m2    GFR Estimate If Black >90 >60 mL/min/1.7m2   CT Head Neck Angio w/o & w Contrast     Narrative    1. Head CTA demonstrates no aneurysm or stenosis of the major  intracranial arteries.   2. Neck CTA demonstrates no stenosis of the major cervical arteries.   3. No evidence of intracranial hemorrhage on the noncontrast head CT.   4. Right suprahyoid neck lymphadenopathy not significantly changed  compared to 6/12/2015.     Influenza A/B antigen swab   Result Value Ref Range    Influenza A/B Agn Specimen Nasopharyngeal     Influenza A Negative NEG    Influenza B  NEG     Negative   Test results must be correlated with clinical data. If necessary, results   should be confirmed by a molecular assay or viral culture.     UA reflex to Microscopic and Culture   Result Value Ref Range    Color Urine Light Yellow     Appearance Urine Clear     Glucose Urine >1000 (A) NEG mg/dL    Bilirubin Urine Negative NEG    Ketones Urine 40 (A) NEG mg/dL    Specific Gravity Urine 1.036 (H) 1.003 - 1.035    Blood Urine Negative NEG    pH Urine 6.5 5.0 - 7.0 pH    Protein Albumin Urine 30 (A) NEG mg/dL    Urobilinogen mg/dL Normal 0.0 - 2.0 mg/dL    Nitrite Urine Negative NEG    Leukocyte Esterase Urine Negative NEG    Source Clean catch urine     RBC Urine 2 0 - 2 /HPF    WBC Urine 1 0 - 2 /HPF    Squamous Epithelial /HPF Urine <1 0 - 1 /HPF    Mucous Urine Present (A) NEG /LPF   Urine Culture   Result Value Ref Range    Specimen Description Midstream Urine     Special Requests Specimen received in preservative     Culture Micro Pending     Micro Report Status Pending      *Note: Due to a large number of results and/or encounters for the requested time period, some results have not been displayed. A complete set of results can be found in Results Review.[YT1.1]      All cardiac studies reviewed    All imaging studies reviewed[YT1.2]      Admitting Physician:Mikey Elizabeth MD[YT1.1]    Day team addendum[KC1.2]  April 3, 2017[KC1.3]    Assessment and plan:[KC1.2]  Betty Tee is a 76 year old female with a history of  Sjogren's Syndrome, interstitial lung disease, DM type II, HFpEF, DVT on Coumadin, left ventricular hypertrophy, afib on Metoprolol, and HTN who presents to the ED after her niece call EMS due to concern for AMS, and dizziness.[YT1.1] Her AMS resolved and she was found to have Acute hypoxic respiratory failure secondary to CAP.[KC1.2]     ## Acute hypoxic respiratory failure- At home-4 LPM with activity only, now on 2 LPM at rest.[YT1.2]  ## ILD- (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 Also had lung nodules[YT1.3]) Follows up w/ Dr. Walsh- Last seen on[YT1.1] 3/22/17- Stable.[YT1.3]  ## CAP[YT1.1]  Patient febrile, tachycardic, tachypneic and with increased supplemental oxygen demands. Even though negative Procalcitonin and no leukocytosis, patient acutely ill and toxic appearing. R[KC1.2]ecent increase of cough, malaise, and CXR showing mild streaky opacities in the right lung base of infection or atelectasis[YT1.1]. Plan to start empiric treatment for CAP with ceftriaxone and azithromycin. Wells criteria for PE puts the patient at low risk for PE (1.5 points). Will proceed with a d-dimer.[KC1.2]     - Stared on Azithromycin, and Ceftriaxone[YT1.1] (started 04/02)[KC1.2]  - Cont. O2, now at 2 LPM   - Prednisone increased to 30 mg/day (stress dose)[YT1.1]- (Home dose: 10 mg daily)[YT1.3]  - Cont home montelukast  - Cont home Flovent  - Duoneb   - Albuterol neb prn[YT1.1]  - Incentive spirometry  - Consider CT with contrast to rule out PE if d-dimer positive.[KC1.2]    ## Diabetes Mellitus- uncontrolled  Hgb A1c 11.6 on 4/2/17    Home regimen:  - Metformin 1000 mg daily    Hosp regimen:  - holding metfo[YT1.1]rmin  - Basal: Lantus 10 units QAM  - Correction: LSSI    Patient discussed and examined by faculty.    Sandra Jones MD MPH  PGY2- [facility], Family Medicine  Pager- [phone][KC1.2]                 Revision History        User Key Date/Time User Provider Type Action    > KC1.3 4/3/2017   2:51 PM Sandra Jones MD Resident Sign     KC1.2 4/3/2017  2:28 PM Sandra Jones MD Resident      KC1.1 4/3/2017  2:25 PM Sandra Jones MD Resident Incomplete Revision     YT1.4 4/3/2017  7:31 AM Mikey Elizabeth MD Resident Sign     YT1.2 4/3/2017  6:49 AM Mikey Elizabeth MD Resident Sign     YT1.6 4/3/2017  5:00 AM Mikey Elizabeth MD Resident      YT1.5 4/3/2017  4:59 AM Mikey Elizabeth MD Resident      YT1.3 4/3/2017  4:48 AM Mikey Elizabeth MD Resident      YT1.1 4/3/2017  1:30 AM Mikey Elizabeth MD Resident                   Discharge Summaries     No notes of this type exist for this encounter.         Consult Notes      Consults by Meghan Stone RN at 4/10/2017  2:50 PM     Author:  Meghan Stone RN Service:  Endocrinology Author Type:  Nurse    Filed:  4/10/2017  2:50 PM Date of Service:  4/10/2017  2:50 PM Note Created:  4/10/2017  2:25 PM    Status:  Signed :  Meghan Stone RN (Nurse)     Consult Orders:    1. Diabetes Educator IP Consult [[id number]] ordered by Beatriz Painting MD at 04/09/17 0958                Diabetes Education    Received consult request.to see this 76 year old female for diabetes education.  Patient was seen by diabetes education on 4/5/17, but was too ill at that time and was subsequently transferred to ICU due to respiratory distress.    Patient now transferred to .  Patient on a 2 gram sodium diet, up in chair, visiting with friend.  Patient receptive to starting diabetes education today.  Her RN has been working with her today on the mechanics of the insulin pen.      Met with patient and friend/roommate Yvette.  Discussed insulin plan.  Patient quite overwhelmed with details of determining NovoLog dose.  Discussed option of a regimen with set meal doses of NovoLog rather than carbohydrate counting and correction scale and she is receptive to this option.    Patient requested that we do just a small amount of education today, then continue  tomorrow.  Today we focused on insulin administration.  She practiced, needed some cues.  She would like to do her dinnertime injection of insulin, with her nurse supervising.  Left home pen needles at bedside.    Will meet with patient and Yvette tomorrow at 10 am to continue education.  Meghan Stone MS RN CDE [facility]  [phone][AM1.1]       Revision History        User Key Date/Time User Provider Type Action    > AM1.1 4/10/2017  2:50 PM Meghan Stone, RN Nurse Sign            Consults by Juana Batista MD at 4/6/2017  1:38 PM     Author:  Juana Batista MD Service:  Cardiology Author Type:  Resident    Filed:  4/6/2017  4:54 PM Date of Service:  4/6/2017  1:38 PM Note Created:  4/6/2017  1:38 PM    Status:  Attested :  Juana Batista MD (Resident)    Cosigner:  TERA Miles MD at 4/7/2017  6:57 AM         Consult Orders:    1. Cardiology General Adult IP Consult: Patient to be seen: Routine within 24 hrs; Call back #: Pager: [phone] (night)/ [phone] (day); 77 yo F with acute hypoxic respiratory failure.  TTE performed today shows new septal wall akinesis.; Consultant may ente... [[id number]] ordered by Beatriz Painting MD at 04/06/17 1310           Attestation signed by TERA Miles MD at 4/7/2017  6:57 AM        Attestation:  Patient was seen and evaluated with the team.Agree with consult note.                                      Cardiology Inpatient Consultation  April 6, 2017    Reason for Consult:  A cardiology consult was requested by Dr. Beatriz Painting from the [facility] Medicine service to provide clinical guidance regarding new abnormal Echo findings and elevated Trop.    HPI:   Betty Tee is a 76 year old female with a past medication history of[EP1.1] Sjogren's associated ILD and bronchiolitis obliterans,[EP1.2] a.fib with controlled ventricular respone, LVH, ILD, HFpEF, DM type 2, ANGELICA, DVT who was admitted for AMS[EP1.1], dizziness and acute hypoxia  "respiratory failure,[EP1.2] influenza positive[EP1.1] with concern for CAP. On admission ACS and stroke work up negative. She was started on antibiotics, tamiflu and her daily home prednisone was increased. This morning patient had a.fib with RVR with rate in 160's, post-tussive emesis and episode of severe respiratory failure requiring 100% bipap. Rapid response was called, lactic acid 3.0 and she was then transferred to  for higher level of care. Troponin was slightly elevated, EKG was normal. CXR showed findings consistent with possible pulmonary edema/fluid overload. Echo was completed and showed new basal to mid anteroseptal akinesis and primary team consulted Cards for further evaluation.     Patient is followed by Dr. Aubrey Hurtado, last office visit 1/26/2016; Dr. Radha Rosa, last office visit 1/12/2017, Dr. Meño Walsh, last office visit 3/2/2017.[EP1.2]      At the time of interview, the patient denies chest pain[EP1.1],[EP1.2] PND, palpitations, lightheadedness, or syncope[EP1.1]. Reports diffuse muscle aches, unable to get into a comfortable position,[EP1.2] dyspnea at rest or with exertion, orthopnea[EP1.1] and fatigue. She is unable to localize any chest pain asked \"why is everyone worried about my heart\"?[EP1.2]     Review of Systems:    Complete review of systems was performed and negative except per HPI.    PMH:  Past Medical History:   Diagnosis Date     Alcohol abuse, in remission      Allergic rhinitis, cause unspecified     allegra helps when she takes it     Antiplatelet or antithrombotic long-term use      Atrial fibrillation (H)     in hosp in 11/11 after surgery w/ fluid overload     Cardiomegaly     LVH on stress echo- cardiac w/u at [facility]- neg CT scan for PE, neg stress echo in 8/06     Chest pain, unspecified      Disorder of bone and cartilage, unspecified     osteopenia (had been on prempro), improved on 6/06 dexa, stable dexa 11/10     Diverticulosis of colon (without mention " of hemorrhage)     last episode yrs ago     Essential hypertension, benign      Gastro-oesophageal reflux disease      Insomnia, unspecified     weaned off clonazepam     Irregular heart beat      Lumbago 7/09    MRI with [facility], now seeing Dr. 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 Alvarado.  She was first diagnosed with A.fib in 10/2011 during an inpatient stay for diverticulitis which ultimately required partial colectomy, Her A.fib at that time presented w/ RVR and pulmonary edema. She was treated with IV metoprolol and diuretics, followied by diltiazem and ultimately discharged on warfarin. Aside from pulmonary edema, A.fib was not symptomatic. TTE 10/2011 showed LVEF=55-60%, diastolic dysfunction/high filling pressures with no other significant structural nidus for A.fib. She had paroxysmal A.fib subsequently after discharge from that admission. LifeWatch event monitor 1/21/13-2/3/13 showed 1 episode of palpitations, correlated with 1 episode of A.fib lasting 4 hrs with VR up to 132. More recently, her A.fib has been present on all ECGs and has been felt to be persistent/permanent. She has been managed with a rate control strategy and anticoagulation with warfarin. Her prior therapies have included diltiazem CD (stopped earlier this year.)       Diverticulitis of colon 09/24/2004     [facility]: High     Multiple complications, surgeries, and hospitalizations from ~9/11-12/11 (see  12/09/11 note for overview).  A.fib started during with fluid overload during one of these hospitalizations.  Colostomy takedown in 2/12.  F/u colonoscopy in 12/12 (Dr. Canseco) looked good- rec next f/u colonoscopy in 10 yrs.  Problem list name updated by automated process. Provider to review       CAP (community acquired pneumonia) 04/03/2017     [facility]: Medium     Type 2 diabetes mellitus without complication, without long-term current use of insulin (H) 10/24/2016     [facility]: Medium     (HFpEF) heart failure with preserved ejection fraction (H) 08/27/2016     [facility]: Medium     7/16 Cardiology notes- She was admitted again 7/2-7/4/16  with hypoxemia, which was felt to be related to HFpEF. She was noted in this setting to be in asymptomatic A.fib, VE=[id number]. Toprol XL was increased to 50 mg daily. As she had shown no evidence of recurrent GI bleeding, warfarin was resumed on discharge. She was scheduled for EP follow up to address A.fib. Of note, she was also diagnosed with ANGELICA and started on CPAP at this time which she has been using regularly.  Assessment- Sister Sita continues to experience NYHA class II-III exertional dyspnea and appears modestly hypervolemic on exam today. We have instructed her to increase her furosemide to 40 mg qAM/20mg qPM for the next 2 days, and arranged for her to establish care with [facility] within 1 week with BMP at that time.       Acute edema of lung (H) 07/04/2016     [facility]: Medium     Acute and chronic respiratory failure with hypoxia (H) 07/02/2016     [facility]: Medium     Lower GI bleed 06/26/2016     [facility]: Medium     Hospitalized 6/26-28/16 with [facility].  Colonoscopy with Dr. Siddiqi intended for after hospital stay, but pt had multiple complications including readmission on 7/2/16 for respiratory failure due to fluid overload and uncontrolled a.fib.  Hgb dropped from 13's to 9's, then to low of 8.5 during second hospitalization.  Colonoscopy f/u not done  due to medical complications.  Hgb's slowly improving though not back to baseline (10's).  12/16 consult with Dr. Angelica mascorro of pt's ability to safely tolerate a colonoscopy or colon surgery.  **Rec considering cologuard test to stratify her risk- will discuss with pt at upcoming visit/s.       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  Followed by [facility] and [facility]ology.  Worsening PFT's in 10/15 despite increase in prednisone from 5mg/d to 10mg/d.    ILD conference- thought Follicular bronchiolitis -started on flovent, azithromycin and montelukast in 4/16.       ANGELICA (obstructive sleep apnea) 04/12/2016     [facility]: Medium     Sleep study in '16, mild sleep apnea, but significant sleep hypoventilation.  Started on CPAP, not helpful, so put on bilevel PAP through Dr. Chandler.       Sjogren's syndrome (H) 10/30/2015     [facility]: Medium     Dx in '15 by rheum- likely primary Sjogrens syndrome- 'with borderline positive lip bx, low titer RG, low titer SSA ab, sicca symptoms, interstitial lung disease w/ reticular opacities in lungs- paratracheal lymphadenopathy, elevated CRP'.    Rheum rec txt per pulmonary, rec f/u with rheum in 4/16.   Sx's likely since '12 with migrating joint pains, seen by rheum in '12 (Dr. Ray), dx with inflammatory jt disease, on 0-5mg of prednisone since then with minimal f/u.  Worsening SOB since early '15, lung lesions, PFT's with 50% decreased function- referred to pulmonary.  Has been on more stable prednisone 5mg/d for few months prior to 10/15 f/u PFT's stable, which remained stable.  Will cont f/u through pulmonary and rheum.           Enlarged lymph node 08/04/2015     [facility]: Medium     Gastroesophageal reflux disease without esophagitis 07/23/2015     [facility]: Medium     Neck mass 04/28/2015     [facility]: Medium     4/7/15 CT scan- prominent lymph nodes- rec f/u clinically,  or f/u CT scan in 3-6 months.  Also noted apical lung changes- air trapping vs interstitial pulmonary edema (referred to pulmonary- dx with Sjogren's after seen by rheumatology).  6/15 CT scan- stable neck node, indeterminant- no f/u rec.  Possible related to the Sjogren's.    8/15- FNA of neck mass by ENT, Dr. Polk, no signs of malignancy/lymphoma.  F/u with 9/15 with Dr. Polk - focus on lip/cheek bx's.  10/15- pt notes mass is still there, but stable.         Rib pain 10/31/2014     [facility]: Medium     S/p horrible fall down stairs in 8/14- hospitalized for a few days, then in rehab for a couple weeks.  Significant swelling, no fx's.  INR dropped for a bit between hosp/rehab, and pt developed DVT in 9/14 in R leg.       Fatty liver disease, nonalcoholic 09/24/2014     [facility]: Medium     DVT (deep venous thrombosis) (H) 09/15/2014     [facility]: Medium     Dx on 9/11/14 via u/s- worsening sx's over prior few days.  10/14 hematology consult- felt that the DVT was not a coumadin failure- attributed to immobility s/p fall, lower INR during hospitalization/rehab stay.    Rec continued long-term coumadin therapy (due to a.fib), f/u LE u/s in 3-6- months (nl f/u u/s in 4/15), and compression stockings x 2 yrs (pt compliant).       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       Allergic reaction caused by a drug - likely plaquenil 04/30/2013     [facility]: Medium     Pt seen at [place] 5/13/13- plan to do prednisone 7.5mg daily until able to wean down (Dr. Kevin Marie).  Blood tests q3 months.       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  If she goes down too fast- gets shoulder, hip and generalized joint pains.  [facility].  4/15- prednisone at 2.5mg/d  Referred to [facility] Rhe[facility]- dx with Sjogrens, and sx's stabilized  on steady dose of prednisone 5mg/d (10/15).       Insomnia 05/26/2012     [facility]: Medium     Left ventricular hypertrophy 11/04/2011     [facility]: Medium     LVH on stress echo- cardiac w/u at [facility] ER- neg CT scan for PE, neg stress echo in 8/06        Sciatica 11/03/2010     [facility]: Medium     [facility] - was on neurontin and zanaflex at night- helping her sleep.  Last MRI in ~6/09.  Improved after 11/11 surgery- stopped meds.       Essential hypertension with goal blood pressure less than 140/90 10/19/2010     [facility]: Medium     Lisinopril and diltiazem (through cards), HCTZ.       Major depressive disorder, recurrent episode, moderate      [facility]: Medium     Paxil 10mg/d for years- increased to 20mg/d in 8/14.  Trial off in 10/15 (concerned with wt gain).  Tried off paxil in 5/09, restarted in 7/09.  Weaned off clonazepam.           Restless legs syndrome (RLS) 09/06/2007     [facility]: Medium     Health Care Home 02/09/2012     [facility]: Low              Advanced directives, counseling/discussion 11/01/2011     [facility]: Low     Received outside advance directive.  HCD:Previously signed by patient and notarized by .  scanned into EMR as Advance Directive/Living Will document. View document and details in Code Status History Report. Please see advance directive for specifics.   Health care directive (FIVE WISHES) reviewed and documented.  5/10/12 MALIKA Aguilar LPN        Received outside DNR form.    scanned and placed behind media tab.  Please see DNR form for specifics. Routed to certified facilitator for review and further documentation.  DNR form reviewed and documented.  11/1/11 MALIKA Aguilar LPN  In media tab under date 10/14/11.  Lev Tristan MD           CARDIOVASCULAR SCREENING; LDL GOAL LESS THAN 130 10/31/2010     [facility]: Low     Disorder of bone and cartilage 09/24/2004     [facility]: Low     Osteopenia in '10, recheck '15.  Risk factors  of PPI use and prednisone use since '12 (0-5mg/d).  Should check Vit D levels as well.       Osteoarthritis 09/24/2004     [facility]: Low     Problem list name updated by automated process. Provider to review       Alcohol abuse, in remission 09/24/2004     [facility]: Low     Temporomandibular joint disorder 11/24/2003     [facility]: Low     Problem list name updated by automated process. Provider to review       Social History:  Social History   Substance Use Topics     Smoking status: Former Smoker     Packs/day: 0.50     Years: 10.00     Types: Cigarettes     Quit date: 8/1/2011     Smokeless tobacco: Never Used      Comment: 1/2 ppd     Alcohol use No      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     Family History:  Family History   Problem Relation Age of Onset     C.A.D. Mother 63     MI- first at age 63     C.A.D. Sister 52     Minor MI- age 50's     HEART DISEASE Mother      HEART DISEASE Sister      Hypertension Mother      Hypertension Sister      Hypertension Sister      Hypertension Brother      CEREBROVASCULAR DISEASE Mother      Cancer - colorectal Sister 48     Late 40's early 50's     Prostate Cancer Brother 74     Dx'd age 74     Alcohol/Drug Father      Alzheimer Disease Father      GASTROINTESTINAL DISEASE Sister      Diverticulitis     GASTROINTESTINAL DISEASE Brother      Diverticulitis     Lipids Sister      Lipids Sister      Parkinsonism Brother      DIABETES Sister      HEART DISEASE Sister      CHF     CANCER Sister      lung, smoker     Substance Abuse Sister      Substance Abuse Brother      Dementia Father      Asthma Sister      CANCER Sister      Substance Abuse Sister      Substance Abuse Brother      Asthma Sister      Hypertension Father      Breast Cancer Daughter      Prostate Cancer Brother      Hyperlipidemia Mother      Hyperlipidemia Father      Hyperlipidemia Brother        Medications:    piperacillin-tazobactam  3.375 g Intravenous Q6H     vancomycin (VANCOCIN) IV  2,000 mg  Intravenous Q12H     pantoprazole  40 mg Intravenous QAM AC     ipratropium - albuterol 0.5 mg/2.5 mg/3 mL  3 mL Nebulization Q2H     insulin aspart  1-6 Units Subcutaneous Q4H     [START ON 4/7/2017] insulin glargine  7 Units Subcutaneous QAM AC     methylPREDNISolone sodium succinate  125 mg Intravenous Q6H     warfarin  7.5 mg Oral ONCE at 18:00     oseltamivir  75 mg Oral BID     insulin aspart   Subcutaneous QAM AC     insulin aspart   Subcutaneous Daily with lunch     insulin aspart   Subcutaneous Daily with supper     furosemide  10 mg Oral Daily     spironolactone  25 mg Oral Daily     acetaminophen  1,000 mg Oral Q8H    Or     acetaminophen  650 mg Rectal Q8H     lactobacillus rhamnosus (GG)  1 capsule Oral TID AC     fluticasone furoate  1 puff Inhalation Daily     ketoconazole   Topical BID     montelukast  10 mg Oral At Bedtime     PARoxetine  10 mg Oral At Bedtime     sodium chloride (PF)  3 mL Intracatheter Q8H     metoprolol  100 mg Oral Daily     enoxaparin  40 mg Subcutaneous Q24H     insulin aspart  1-5 Units Subcutaneous At Bedtime         Warfarin Therapy Reminder       - MEDICATION INSTRUCTIONS -         Physical Exam:  Temp:  [96.6  F (35.9  C)-99.3  F (37.4  C)] 99.2  F (37.3  C)  Pulse:  [] 87  Heart Rate:  [] 105  Resp:  [16-36] 22  BP: (126-162)/() 126/94  FiO2 (%):  [40 %] 40 %  SpO2:  [97 %-100 %] 97 %    Intake/Output Summary (Last 24 hours) at 04/06/17 1338  Last data filed at 04/06/17 1045   Gross per 24 hour   Intake              485 ml   Output             2175 ml   Net            -1690 ml     GEN:[EP1.1] arousable, falls to sleep and is easily arousable, fidgety, unable to get still in bed.[EP1.2]   HEENT: no icterus  CV: RRR, normal s1/s2, no murmurs/rubs/s3/s4, no heave[EP1.1].[EP1.2]  CHEST:[EP1.1] wheezing heard throughout lung bases, coarse rhonchi diffusely in right lung.[EP1.2]   ABD: soft, non-tender, normal active bowel sounds  EXTR: pulses[EP1.1]  palpable[EP1.2]. No clubbing, cyanosis or[EP1.1] significant[EP1.2] edema.   NEURO:[EP1.1] arousable, interacted, answering questions, but not consistently appropriately. Seems intermittently confused which was confirmed by friend at bedside.[EP1.2]     Diagnostics:  All labs and imaging were reviewed, of note:[EP1.1]    Lab Results   Component Value Date    NTBNPI 3531 (H) 04/06/2017    NTBNP 755 (H) 11/03/2016     Lab Results   Component Value Date    WBC 5.8 04/06/2017    HGB 9.5 (L) 04/06/2017    HCT 33.4 (L) 04/06/2017    MCV 75 (L) 04/06/2017     04/06/2017     Lab Results   Component Value Date     04/06/2017    POTASSIUM 4.0 04/06/2017    CHLORIDE 107 04/06/2017    CO2 23 04/06/2017     (H) 04/06/2017     Lab Results   Component Value Date    INR 2.02 (H) 04/06/2017     Lab Results   Component Value Date    BUN 11 04/06/2017    CR 0.68 04/06/2017     Lab Results   Component Value Date    TROPONIN 0.00 04/02/2017    TROPI 0.110 (H) 04/06/2017[EP1.3]       Lab Results   Component Value Date    TROPI 0.110 (H) 04/06/2017    TROPI 0.020 04/02/2017    TROPI  07/02/2016     <0.015  The 99th percentile for upper reference range is 0.045 ug/L.  Troponin values in   the range of 0.045 - 0.120 ug/L may be associated with risks of adverse   clinical events.      TROPI  01/09/2015     <0.015  The 99th percentile for upper reference range is 0.045 ug/L.  Troponin values in   the range of 0.045 - 0.120 ug/L may be associated with risks of adverse   clinical events.   Effective 7/30/2014, the reference range for this assay has changed to reflect   new instrumentation/methodology.      TROPI <0.012 10/29/2011    TROPONIN 0.00 04/02/2017    TROPONIN 0.00 01/09/2015       EKG 4/6/2017:  Rhythm: Atrial fibrillation - rapid and with RVR  Rate: Tachycardia  Axis: Normal  Ectopy: None  Conduction: Normal  ST Segments/ T Waves: No ST-T wave changes and No acute ischemic changes  Q Waves: None  Comparison to  prior: When compared with ECG of 02-APR-2017 21:44, ST no longer depressed in Anterior leads     T wave inversion no longer evident in Inferior leads     T wave inversion no longer evident in Lateral leads    Clinical Impression: atrial fibrillation (chronic) with RVR    Echocardiogram 4/6/2017:   Interpretation Summary  Left ventricular function is normal. The visually estimated EF is 60-65%.  There is basal to mid anteroseptal akinesis.  Right ventricular function, chamber size, wall motion, and thickness are normal.  No significant valvular abnormalities.  The inferior vena cava was normal in size with preserved respiratory variability.  This study was compared with the study from 7/3/2016 . The basal to mid anteroseptal akinesis is new.[EP1.1]    Bilateral Lower Extremity ultrasound 4/6/17:  IMPRESSION: No evidence of deep venous thrombosis in either lower extremity.    Stress NM Lexiscan (6/5/2014)  1. Normal myocardial SPECT study with a summed stress score of 2. A summed stress score of less than 4 is associated with an annual event rate of 0.5% and 0.3% for myocardial infarction and cardiac death, respectively (Caro. Circulation 1998;98:535-43).   2. No significant perfusion abnormalities.   3. Hyperdynamic left ventricular systolic function as described above.   4. No prior study available for comparison.    Life [employer] (1/21/2013-2/3/2013)  Sinus rhythm with PAC's - symptoms possible correlated with atrial premature beats.    ECHO (10/29/2011)   Global and regional left ventricular function is normal with an EF of 55-60%. The transmitral Doppler filling pattern is restrictive and c/w increased left atrial pressure (diastolic dysfunction). Global right ventricular function is normal. Severe left atrial enlargement. Right ventricular systolic pressure is mildly elevated at 27 mmHg above the right atrial pressure. The inferior vena cava is dilated at 2.35 cm without respiratory variability, consistent  with increased right atrial pressure. No pericardial effusion is present.[EP1.2]     Assessment and Recommendation:[EP1.1]  Sister Betty Tee is a 76 year old female with a past medication history of a.fib with controlled ventricular respone, LVH, ILD, HFpEF, DM type 2, ANGELICA, DVT who was admitted for AMS who is influenza positive and likely CAP and Cardiology consulted for abnormal finding on Echo and elevated troponin. After reviewing the Echo images, abnormality noted is likely secondary to persistent a.fib and inability to lie still. No acute cardiac concerns at this time. Elevated troponin likely due to demand ischemia 2/2 afib and current acute illness with respiratory distress.[EP1.2]     1.[EP1.1] A.fib[EP1.2]  2.[EP1.1] Abnormal Echo results, see above[EP1.2]   3.[EP1.1] Acute hypoxic respiratory failure  4. Influenza, possible CAP  5. Sjogren's associated ILD and bronchiolitis obliterans  6. AMS  7. HFpEF, Euvolemic  8. HTN  9. DM - uncontrolled      Recommendations:  - no acute cardiac intervention indicated at this time  - may consider repeat Echo at resolution of acute illness  - if acute cardiac clinical changes, please contact Cardiology  - will need to follow up with Dr. Hurtado on discharge  - Cardiology will sign off at this time, but will be available if any acute cardiac changes occur this admission.[EP1.2]     I have discussed the above with [EP1.1] Jd[EP1.2].    Thank you for consulting the cardiovascular services at the [facility]. Please do not hesitate to call us with any questions.     Juana Batista MD  [facility] Cardiology Consult Team  Pager [phone] or [phone][EP1.1]       Revision History        User Key Date/Time User Provider Type Action    > EP1.2 4/6/2017  4:54 PM Juana Batista MD Resident Sign     EP1.3 4/6/2017  1:40 PM Juana Batista MD Resident      EP1.1 4/6/2017  1:38 PM Juana Batista MD Resident              Consults by Meghan Stone RN at 4/5/2017  3:32 PM     Author:  Meghan Stone RN Service:  Endocrinology Author Type:  Nurse    Filed:  4/5/2017  3:41 PM Date of Service:  4/5/2017  3:32 PM Note Created:  4/5/2017  3:28 PM    Status:  Signed :  Meghan Stone RN (Nurse)     Consult Orders:    1. Diabetes Educator IP Consult [[id number]] ordered by Coty Moreno MD at 04/04/17 1131     2. Medication History IP Pharmacy Consult [[id number]] ordered by Abhishek Elizabeth MD at 04/03/17 1553                Diabetes Education    Received consult request to see this 76 year old female for diabetes education.  Patient with history of  Sjogren's Syndrome, interstitial lung disease, DM type II, HFpEF, DVT on Coumadin, left ventricular hypertrophy, afib on Metoprolol, and HTN who presented to the ED after her niece call EMS due to concern for AMS, and dizziness.[AM1.1]   Hemoglobin A1c on 4/4/17 was 14.3%.  Patient started on insulin therapy.  Current insulin regimen is:  Insulin glargine (Lantus) 14 units daily  Insulin aspart 1 unit/15 grams CHO  Medium correction scale pre-meal and hs.    Patient also on metformin xr 1000 mg daily and prednisone 30 mg daily.    Met with patient and roommate/friend Yvette.  Patient states she has had some diabetes education as outpatient, but found the information overwhelming.  Sounds like information was focused on carbohydrate counting and label reading.  She has not checked blood glucoses at home.  She has given herself some sort of injections in the past, but doesn't recall name or purpose of injections.    During session, patient easily distractible, looking [place], going to toilet, and also took 3 phone calls.  Friend Yvette is hard of hearing.  Yvette took some notes as we talked.    Today focused session on use of blood glucose monitor. Betty was able to do a fingerstick and obtain result.  Discussed that for discharge we can come up with a less complex insulin  plan and she was relieved to hear this.    Will see again tomorrow afternoon at 2 pm when Yvette can be present.  At this point, am concerned about Betty being safe to go home.  Discussed with RN.    Meghan Stone MS RN CDE [facility]  [phone][AM1.2]        Revision History        User Key Date/Time User Provider Type Action    > AM1.2 2017  3:41 PM Meghan Stone, RN Nurse Sign     AM1.1 2017  3:28 PM Meghan Stone, RN Nurse             Consults by Ander Bain MD at 2017 10:15 PM     Author:  Ander Bain MD Service:  Neurology Author Type:  Resident    Filed:  2017  2:29 AM Date of Service:  2017 10:15 PM Note Created:  2017 10:15 PM    Status:  Cosign Needed :  Ander Bain MD (Resident)    Cosign Required:  Yes             [facility], [place]      Neurology Stroke Consult    Patient Name: Betty Tee  : 1940 MRN#: [id number]    STROKE DATA    Stroke Code:  Time called:  2017 2[EL1.1]157[EL1.2]  Time patient seen:[EL1.1]  2017[EL1.3] 2[EL1.1]157[EL1.2]  Onset of symptoms:[EL1.1]  2017[EL1.3] 2030  Last known normal (pt's baseline):[EL1.1]  2017[EL1.3] 2[EL1.1]157[EL1.2]  Head CT read by Dr Reagan at:[EL1.1]  2017[EL1.4] 2239    TPA treatment:  Not given due to minor / isolated / quickly resolving stroke symptoms.     National Institutes of Health Stroke Scale (at presentation)  NIHSS done at:  time patient seen      Score    Level of consciousness:  (0)   Alert, keenly responsive     LOC questions:  (0)   Answers both questions correctly    LOC commands:  (0)   Performs both tasks correctly    Best gaze:  (0)   Normal    Visual:  (0)   No visual loss    Facial palsy:  (0)   Normal symmetrical movements    Motor arm (left):  (0)   No drift    Motor arm (right):  (0)   No drift    Motor leg (left):  (0)   No drift    Motor leg (right):  (0)   No drift    Limb ataxia:  (0)   Absent    Sensory:  (0)   Normal- no sensory  loss    Best language:  (0)   Normal- no aphasia    Dysarthria:  (0)   Normal    Extinction and inattention:  (0)   No abnormality        NIHSS Total Score:  0        Dysphagia Screen  Time of screening:[EL1.1] 04/02/2017[EL1.5] 2244  Screening results: Passed screening, no dysarthria - Regular Diet with thin liquids     ASSESSMENT & RECOMMENDATIONS     Impression:  75 yo f former smoker with h/o T2DM, a fib with RVR (on warfarin), HTN, and HLD presents for brief episode of delayed response in conversation. The patient reports that she just feels tired. NIHSS 0. CT/CTA without acute abnormalities. No current or h/o localizing symptoms to suggest cerebrovascular pathology.     Recommendations:   -no further stroke w/u necessary    HPI  Betty Tee is a 76 year old female with h/o T2DM, HTN, HLD, and a fib (on warfarin) presents for brief episode of delayed response in conversation. The patient adamantly denies anything is wrong and instead reports that she just feels tired. Her roommate confirms that there was just a brief episode of delayed response in conversation but no slurred/garbled speech. The patient denies any loss of speech comprehension, weakness, numbness, clumsiness, dyarthria, vision changes, or headache. She denies any recent illness, though her fever in the ED was measured to be >102F.      Pertinent Past Medical/Surgical History  Past Medical History:   Diagnosis Date     Alcohol abuse, in remission      Allergic rhinitis, cause unspecified     allegra helps when she takes it     Antiplatelet or antithrombotic long-term use      Atrial fibrillation (H)     in hosp in 11/11 after surgery w/ fluid overload     Cardiomegaly     LVH on stress echo- cardiac w/u at [facility] ER- neg CT scan for PE, neg stress echo in 8/06     Chest pain, unspecified      Disorder of bone and cartilage, unspecified     osteopenia (had been on prempro), improved on 6/06 dexa, stable dexa 11/10     Diverticulosis of colon  (without mention of hemorrhage)     last episode yrs ago     Essential hypertension, benign      Gastro-oesophageal reflux disease      Insomnia, unspecified     weaned off clonazepam     Irregular heart beat      Lumbago 7/09    MRI with DJD, now seeing Dr. Cain for sciatic sx's     Major depressive disorder, recurrent episode, moderate (H)      Obstructive sleep apnea      Osteoarthrosis, unspecified whether generalized or localized, unspecified site      Sjogren's syndrome (H)      Sleep apnea      Tobacco use disorder     chantix in 9/07, started again in 6/08, working       Past Surgical History:   Procedure Laterality Date     BACK SURGERY  1962     BIOPSY BREAST  9/27/02    Biopsy Left Breast     C APPENDECTOMY  1970's?     C NONSPECIFIC PROCEDURE  11/05    exploratory abd lap, adhesions, resolved RLQ pain, diverticulitis episodes     CARDIAC SURGERY       CHOLECYSTECTOMY  1990's?     COLECTOMY LEFT  11/7/2011    Procedure:COLECTOMY LEFT; Laparoscopic mobilization of splenic flexture, sigmoid colectomy, coloprotoscopy, loop illeostomy; Surgeon:CK CASTANEDA; Location:[facility] OR     HYSTERECTOMY TOTAL ABDOMINAL, BILATERAL SALPINGO-OOPHORECTOMY, COMBINED  11/7/2011    Procedure:COMBINED HYSTERECTOMY TOTAL ABDOMINAL, BILATERAL SALPINGO-OOPHORECTOMY; total abdominal hysterectomy, bilateral salpingo-oophorectomy; Surgeon:ALETA MANUEL; Location:[facility] OR     INSERT STENT URETER  11/7/2011    Procedure:INSERT STENT URETER; Placement of Bilateral Ureteral Stents ; Surgeon:PRANEETH BRYANT; Location:[facility] OR     SIGMOIDOSCOPY FLEXIBLE  11/3/2011    Procedure:SIGMOIDOSCOPY FLEXIBLE; Flexible Sigmoidoscopy; Surgeon:CK CASTANEDA; Location:[facility] OR     TAKEDOWN ILEOSTOMY  2/1/2012    Procedure:TAKEDOWN ILEOSTOMY; Takedown Loop Ileostomy ; Surgeon:CK CASTANEDA; Location:[facility] OR       Medications: I have reviewed this patient's current medications.    Allergies: All allergies reviewed and addressed.    Family History: I  have reviewed this patient's family history.    Social History: I have reviewed this patient's social history.    Tobacco use: Former smoker    ROS:  The 10 point Review of Systems is negative other than noted in the HPI or here.     PHYSICAL EXAMINATION  Vital Signs:  B/P: 125/84,  T: 102.6,  P: 122,  R: 17    General:  patient lying in bed without any acute distress    HEENT:  normocephalic/atraumatic  Cardio:  irregularly irregular  Pulmonary:  no respiratory distress  Abdomen:  soft  Extremities:  peripheral pulses palpable  Skin:  intact     Neurologic  Mental Status:  fully alert, attentive and oriented, follows commands, speech clear and fluent  Cranial Nerves:  visual fields intact, PERRL, EOMI with normal smooth pursuit, facial sensation intact and symmetric, facial movements symmetric, hearing not formally tested but intact to conversation, palate elevation symmetric and uvula midline, no dysarthria, shoulder shrug strong bilaterally, tongue protrusion midline  Motor:  no abnormal movements, normal tone throughout, normal muscle bulk, no pronator drift, normal and symmetric rapid finger tapping, able to move all limbs spontaneously, strength 5/5 throughout upper and lower extremities  Sensory:  intact/symmetric to light touch and pin prick throughout upper and lower extremities  Coordination:  FNF and HS intact without dysmetria    Labs  Labs and Imaging reviewed and used in developing the plan; pertinent results included.     Lab Results   Component Value Date     (H) 04/02/2017       The patient was discussed with the Fellow, Dr. Reagan.  The staff is Dr. Gonzalez.    Ander Bain  Pager: [phone][EL1.1]     Revision History        User Key Date/Time User Provider Type Action    > EL1.2 4/4/2017  2:29 AM Ander Bain MD Resident Sign     EL1.5 4/2/2017 11:09 PM Ander Bain MD Resident Sign     EL1.4 4/2/2017 11:00 PM Ander Bain MD Resident      EL1.3 4/2/2017 10:59 PM Odell  MD Ander Resident      EL1.1 4/2/2017 10:53 PM Ander Bain MD Resident                      Progress Notes - Physician (Notes from 04/08/17 through 04/11/17)      Progress Notes by Meghan Stone RN at 4/11/2017  3:44 PM     Author:  Meghan Stone RN Service:  Endocrinology Author Type:  Nurse    Filed:  4/11/2017  3:47 PM Date of Service:  4/11/2017  3:44 PM Note Created:  4/11/2017  3:44 PM    Status:  Signed :  Meghan Stone RN (Nurse)         Met with patient x2 today.  We went through the Accu-check Amanda Plus blood glucose monitor.  When I came back the second time, she did not recall what the black lancing device was for and how to use.  Reviewed verbally that her blood glucose is to be checked before meals and at bedtime.  Also reviewed verbally the insulin plan:  Lantus Solostar pen 23 units daily and NovoLog 10 units with meals.    Her friend/POA was taking notes.  Plan is to discharge to TCU at [facility] today.  Meghan Stone MS RN E [facility]  [phone][AM1.1]       Revision History        User Key Date/Time User Provider Type Action    > AM1.1 4/11/2017  3:47 PM Meghan Stone RN Nurse Sign            Progress Notes by Alta Villa MSW at 4/11/2017  2:00 PM     Author:  Alta Villa MSW Service:  (none) Author Type:      Filed:  4/11/2017  2:18 PM Date of Service:  4/11/2017  2:00 PM Note Created:  4/11/2017  2:00 PM    Status:  Addendum :  Alta Villa MSW ()         Social Work Services Discharge Note      Patient Name:  Betty Tee     Anticipated Discharge Date:  4/11/2017    Discharge Disposition:   TCU:  [facility],[BZ1.1] T: [phone], F: [phone][BZ1.2]    Following MD:  Lenny Greenwood MD     Pre-Admission Screening (PAS) online form has been completed.  The Level of Care (LOC) is:  Determined  Confirmation Code is:[BZ1.1]  [id number][BZ1.3]  Patient/caregiver informed of referral to [facility] for  Pre-Admission Screening for skilled nursing facility (SNF) placement and to expect a phone call post discharge from SNF.     Additional Services/Equipment Arranged:  Friend transport     Patient / Family response to discharge plan:  in agreement     Persons notified of above discharge plan:  Patient, Yvette NghiaAriana, Charge RN, [facility], TCU    Staff Discharge Instructions:  Please fax discharge orders and signed hard scripts for any controlled substances.  Please print a packet and send with patient.     CTS Handoff completed:  YES    Medicare Notice of Rights provided to the patient/family:  YES    GINNA Brewer  5B  (Medical/Surgical)  Phone: [phone]  Pager: [phone][BZ1.1]        Revision History        User Key Date/Time User Provider Type Action    > BZ1.2 4/11/2017  2:18 PM Alta Villa MSW  Addend     BZ1.3 4/11/2017  2:16 PM Alta Villa MSW  Sign     BZ1.1 4/11/2017  2:00 PM Alta Villa MSW              Progress Notes by Alta Villa MSW at 4/11/2017 11:13 AM     Author:  Alta Villa MSW Service:  (none) Author Type:      Filed:  4/11/2017  1:59 PM Date of Service:  4/11/2017 11:13 AM Note Created:  4/11/2017 11:13 AM    Status:  Signed :  Alta Villa MSW ()         Social Work Services Progress Note    Hospital Day: 10  Date of Initial Social Work Evaluation:  4/10/2017  Collaborated with:  Patient, Friend Ariana, medical team, admissions    Data:  Betty is a 76 year old female admitted for influenza and AMS. Comes from home with a roommate, belongs as a nun to the [employer]. Patient would like to DC to TCU for weakness and ongoing diabetes management/learning.     Intervention: Referrals:  [facility]: Spoke with admissions, not beds until Friday.    [facility]: LVM for admissions, faxed referral for review[BZ1.1]   -[BZ1.2]12:05 pm Update:[BZ1.3]  "Received call back from marielos in admissions, provided 5A number for RN to RN report. She will call  back \"shortly\" with decision.[BZ1.2]    [facility]: [facility] for admissions, faxed referral for review    Assessment:  Patient is agreeable to TCU per encouragement from her friends/POA/roommate (Nghia, Yvette, and Ariana). However it is likely that if she does not find placement today at one of her states options, she may go home with HC. TBD.     Plan:    Anticipated Disposition:  TBD TCU vs Home    Barriers to d/c plan:  Placement. Patient is medically ready    Follow Up:  SW following for Dc today.    Alta Villa LGSW, MSW  5B  (Medical/Surgical)  Phone: [phone]  Pager: [phone][BZ1.1]            Revision History        User Key Date/Time User Provider Type Action    > BZ1.3 4/11/2017  1:59 PM Alta Villa, MSTIFFANY  Sign     BZ1.2 4/11/2017 12:06 PM Alta Villa, MSW       BZ1.1 4/11/2017 11:13 AM Alta Villa, MSW              Progress Notes by Beatriz Painting MD at 4/10/2017  6:22 AM     Author:  Beatriz Painting MD Service:  Family Medicine Author Type:  Resident    Filed:  4/10/2017  5:06 PM Date of Service:  4/10/2017  6:22 AM Note Created:  4/10/2017  6:22 AM    Status:  Attested :  Beatriz Painting MD (Resident)    Cosigner:  Lenny Greenwood MD at 4/10/2017  7:50 PM        Attestation signed by Lenny Greenwood MD at 4/10/2017  7:50 PM        Attestation:  This patient has been seen and evaluated by Lenny loza on 4/10/2017.  I saw and discussed the case with the primary resident and the care team. I agree with the findings and plan in this note. I have reviewed today's vital signs, medications, laboratory results and imaging results.   Code for today's visit :48166  Inpatient Subsequent Moderate complexity/severity  [facility]                                             [facility] " Medicine - Inpatient daily progress note    Date of admission: 4/2/2017  Date of service: 4/[JB1.1]10[JB1.2]/2017.[JB1.1]    Updates:  - Diabetes education today[JB1.3]           Assessment and Plan:      Betty Tee is a 76 year old who was admitted AMS and  for evaluation of persistent cough, dizziness and found to be positive for influenza. She developed an  acute hypoxic respiratory failure 4/6 that is due to pulmonary congestion.     Patient Active Problem List   Diagnosis     Temporomandibular joint disorder     Diverticulitis of colon     Disorder of bone and cartilage     Osteoarthritis     Alcohol abuse, in remission     Restless legs syndrome (RLS)     Major depressive disorder, recurrent episode, moderate     Essential hypertension with goal blood pressure less than 140/90     CARDIOVASCULAR SCREENING; LDL GOAL LESS THAN 130     Sciatica     Advanced directives, counseling/discussion     Colouterine fistula     Atrial fibrillation with controlled ventricular response (H)     Left ventricular hypertrophy     Health Care Home     Insomnia     Joint pains     Allergic reaction caused by a drug - likely plaquenil     Breast fibroadenoma     DVT (deep venous thrombosis) (H)     Fatty liver disease, nonalcoholic     Rib pain     Neck mass     Gastroesophageal reflux disease without esophagitis     Enlarged lymph node     Sjogren's syndrome (H)     ANGELICA (obstructive sleep apnea)     ILD (interstitial lung disease) (H)     Lower GI bleed     Acute and chronic respiratory failure with hypoxia (H)     Acute edema of lung (H)     (HFpEF) heart failure with preserved ejection fraction (H)     Type 2 diabetes mellitus without complication, without long-term current use of insulin (H)     CAP (community acquired pneumonia)     HCAP (healthcare-associated pneumonia)     Influenza B     Heart failure, chronic, with acute decompensation (H)     Type 2 diabetes mellitus with hyperglycemia (H)         ## Acute hypoxic  respiratory failure, pulmonary congestion :  Improving  Multifactorial from acute decompensation of her HFpEF in setting of acute respiratory illness and holding her diuretics in context of ILD.  Her oxygen requirement has decreased after diuresis . Currently not needing oxygen at rest but needs oxygen support when walking (uses 4L at home with exertion/walking).    Plan:  - Will continue home Lasix 40 gm BID and Spironolactone 25 mg daily   - Add oral potassium (20 meq daily)  -Continue Metoprolol   -Daily weights  -Input and Output   -IV Lasix as needed     ## HFpEF, with recent decompensation, improved   Plan:  - as above   -Low salt diet     ##Possible Hospital Acquired Pneumonia   ##Influenza:  ##ILD   ##Reactive Airway   Patient on last day of Tamiflu.  At risk for Staph Pneumonia but negative nasal  MRSA . No red flags for Staph pneumonia. Antibiotics was broadened during acute respiratory failure. Her  blood culture has been negative and pro calcitonin level is not suggestive of a bacterial infection .  However  her xray today showed more haziness on the left lung field that superimposed bacterial infection can not be ruled out.     Plan:   - IV antibiotics (Azithromycin & Ceftriaxone followed by Vanc & Zosyn) 4/3-4/8, Levofloxacin started 4/8.  Plan for 7-14 days for all antibiotics  - Follow up blood cultures   - Bipap at night[JB1.1]  If tolerated[JB1.3]  - On baseline prednisone 10 mg daily , we have increased this to 40 mg during decompensation . -[JB1.1] S[JB1.3]tart[JB1.1]ed[JB1.3] wean[JB1.1]ing yesterday (currently at 30 mg)[JB1.3], go down by 10 mg every 3 days until at 10 mg daily.   - Continue fluticasone daily  - Continue duonebs q 4 hours  - Continue albuterol nebs q 2 hours prn  - Lactobacillus  - Robitussin for cough     ## Dizziness/AMS- resolved  Likely multifactorial : Hypoxia and acute respiratory illness. Negative ACS and stroke work up.   Plan :   Observe, address underlying etiology       ## A Fib ,rate controlled   INR goal 2.0-3.0,[JB1.1] INR currently at goal[JB1.3]  Plan:   -continue Metoprolol   - Hold coumadin today    ## HTN , within acceptable ranges     Plan:   - Cont metoprolol 100 mg daily.  - Continue spironolactone 25 mg daily    ## Diabetes Mellitus- uncontrolled  Hgb A1c 14.3 (4/2017).  Was started on an Insulin drip 4/7 for uncontrolled hyperglycemia secondary to recent increase steroid dose. Drip discontinued 4/8.  Blood sugars still not stable    Plan:   - Increase Lantus from 19 to 23 units  - Increase Prandial Coverage from 1 unit /13 gram CHO to 1 unit/9 grams CHO  - Continue high ISS  - Restart Metformin 1000 mg daily  - Diabetes education   - Start low dose ACE inhibitor (lisinopril 2.5 mg daily)    ## ASCVD Risk:  ASCVD risk calculated.  10 year risk is 42.6%.  - Start high intensity statin (atorvastatin 40 mg daily)    ##ANGELICA  Plan:   -Bipap at night     ##Sjorgren Syndrome, Stable   Plan:   -Will observe     ## Physical Deconditioning  - PT/OT    Daily cares -   F:none  E:stable  N:regular diet  Lines:PIV  Activity:-Up as tolerated  CODE:Full Code  Prophylaxis:On warfarin  PCP communication:  - Ilene Tristan     Dispo: Expected discharge date[JB1.1]: tomorrow[JB1.3]             Interval History:   Betty Tee[JB1.1] has no complaints this morning.[JB1.2]      Overall, she states her breathing has improved and she is feeling better.  She ambulated yesterday with oxygen (uses oxygen with activity at home).                Review of Systems:   Review of Systems   Constitutional: Negative for chills and fever.   HENT: Negative for congestion.    Respiratory: Positive for cough and shortness of breath (with ambulation).    Cardiovascular: Negative for chest pain.   Gastrointestinal: Negative for abdominal pain, constipation, diarrhea, nausea and vomiting.   Neurological: Negative for dizziness, light-headedness and headaches.   Psychiatric/Behavioral: Negative  for confusion. The patient is not nervous/anxious.             Physical Exam (Resident / Clinician):   Vitals were reviewed  Temp: 98  F (36.7  C) Temp src: Oral BP: 145/80   Heart Rate: 97 Resp: 20 SpO2: 95 % O2 Device: None (Room air) Oxygen Delivery: Bipap 10/5, 40% FiO2    Physical Exam   Constitutional: She is oriented to person, place, and time. She appears well-developed and well-nourished. No distress.[JB1.1]   Sitting in bed, not in distress[JB1.2]   Neck: Normal range of motion. No JVD present.   Cardiovascular: Normal rate and intact distal pulses.    No murmur heard.  Pulmonary/Chest: Effort normal. No stridor. No respiratory distress. She has no wheezes.[JB1.1]   Crackles at bases bilaterally[JB1.3]   Abdominal: Soft. Bowel sounds are normal. She exhibits no distension. There is no tenderness.   Musculoskeletal: Normal range of motion. She exhibits edema (trace edema bilaterally). She exhibits no tenderness.   Neurological: She is alert and oriented to person, place, and time.   Skin: Skin is warm and dry.   Psychiatric: She has a normal mood and affect. Her behavior is normal. Judgment and thought content normal.   Vitals reviewed.          Data:   ROUTINE LABS (Last four results)  CMP[JB1.1]    Recent Labs  Lab 04/10/17  0850 04/09/17  0514 04/08/17  0345 04/07/17  0313 04/06/17  1944 04/06/17  0723 04/05/17  0726    142 143 140 140 140 140   POTASSIUM 3.5 3.5 4.0 4.1 3.9 4.0 4.0   CHLORIDE 105 104 107 105 107 107 106   CO2 27 26 24 24 25 23 25   ANIONGAP 7 12 12 10 8 10 8   * 205* 138* 229* 291* 213* 130*   BUN 14 15 14 14 12 11 13   CR 0.65 0.68 0.55 0.61 0.61 0.68 0.68   GFRESTIMATED 88 84 >90Non  GFR Calc >90Non  GFR Calc >90Non  GFR Calc 83 85   GFRESTBLACK >90African American GFR Calc >90African American GFR Calc >90African American GFR Calc >90African American GFR Calc >90African American GFR Calc >90African American GFR Calc  >90African American GFR Calc   CLAUDY 8.8 8.4* 8.1* 7.8* 7.8* 8.3* 8.6   MAG 2.2  --  2.3  --  2.0 2.1 2.0   PHOS 2.0*  --   --   --  2.2* 3.2 2.4*[JB1.4]     CBC[JB1.1]    Recent Labs  Lab 04/10/17  0850 04/09/17  0514 04/08/17  0345 04/07/17  0313   WBC 7.9 7.6 6.8 4.4   RBC 4.42 4.08 3.88 4.19   HGB 9.2* 8.4* 8.1* 8.8*   HCT 32.8* 30.5* 28.0* 31.6*   MCV 74* 75* 72* 75*   MCH 20.8* 20.6* 20.9* 21.0*   MCHC 28.0* 27.5* 28.9* 27.8*   RDW 19.2* 19.2* 19.0* 19.1*    233 116* 193[JB1.4]     INR[JB1.1]    Recent Labs  Lab 04/10/17  0850 04/09/17  0514 04/08/17  0345 04/07/17  0313   INR 2.47* 3.76* 3.09* 2.03*[JB1.5]     CRP[JB1.1]    Recent Labs  Lab 04/06/17  0723 04/04/17  0858   CRP 64.0* 54.0*[JB1.4]         Recent Labs  Lab 04/06/17  1055 04/06/17  1028 04/03/17  1045   CULT No growth after 4 days No growth after 4 days Light growth Normal juan[JB1.6]     CXR 4/8:  IMPRESSION: Worsening perihilar opacities, left greater right.  Findings likely indicate worsening pulmonary edema.          Medications:     Current Facility-Administered Medications   Medication     predniSONE (DELTASONE) tablet 30 mg     insulin glargine (LANTUS) injection 23 Units     lisinopril (PRINIVIL/Zestril) tablet 2.5 mg     warfarin-No DOSE today     metFORMIN (GLUCOPHAGE-XR) 24 hr tablet 1,000 mg     atorvastatin (LIPITOR) tablet 40 mg     potassium chloride SA (K-DUR/KLOR-CON M) CR tablet 20 mEq     acetylcysteine (MUCOMYST) 20 % nebulizer solution 2 mL     ipratropium - albuterol 0.5 mg/2.5 mg/3 mL (DUONEB) neb solution 3 mL     levofloxacin (LEVAQUIN) tablet 750 mg     insulin aspart (NovoLOG) inj (RAPID ACTING)     insulin aspart (NovoLOG) inj (RAPID ACTING)     insulin aspart (NovoLOG) inj (RAPID ACTING)     insulin aspart (NovoLOG) inj (RAPID ACTING)     insulin aspart (NovoLOG) inj (RAPID ACTING)     insulin aspart (NovoLOG) inj (RAPID ACTING)     furosemide (LASIX) tablet 40 mg     pantoprazole (PROTONIX) EC tablet 40 mg      glucose 40 % gel 15-30 g    Or     dextrose 50 % injection 25-50 mL    Or     glucagon injection 1 mg     dextrose 10 % 1,000 mL infusion     traZODone (DESYREL) half-tab 25-50 mg     spironolactone (ALDACTONE) tablet 25 mg     acetaminophen (TYLENOL) tablet 1,000 mg    Or     acetaminophen (TYLENOL) Suppository 650 mg     lactobacillus rhamnosus (GG) (CULTURELL) capsule 1 capsule     fluticasone (FLONASE) 50 MCG/ACT spray 1-2 spray     fluticasone furoate (ARNUITY ELLIPTA) 200 MCG/ACT inhalation powder 1 puff     ketoconazole (NIZORAL) 2 % cream     polyethylene glycol (MIRALAX/GLYCOLAX) Packet 17 g     montelukast (SINGULAIR) tablet 10 mg     PARoxetine (PAXIL) tablet 10 mg     Warfarin Therapy Reminder (Check START DATE - warfarin may be starting in the FUTURE)     naloxone (NARCAN) injection 0.1-0.4 mg     lidocaine 1 % 1 mL     lidocaine (LMX4) kit     sodium chloride (PF) 0.9% PF flush 3 mL     sodium chloride (PF) 0.9% PF flush 3 mL     Patient is already receiving anticoagulation with heparin, enoxaparin (LOVENOX), warfarin (COUMADIN)  or other anticoagulant medication     albuterol neb solution 2.5 mg     guaiFENesin-dextromethorphan (ROBITUSSIN DM) 100-10 MG/5ML syrup 10 mL     potassium chloride SA (K-DUR/KLOR-CON M) CR tablet 20-40 mEq     potassium chloride (KLOR-CON) Packet 20-40 mEq     potassium chloride 10 mEq in 100 mL intermittent infusion     potassium chloride 10 mEq in 100 mL intermittent infusion with 10 mg lidocaine     potassium chloride 20 mEq in 50 mL intermittent infusion     metoprolol (TOPROL-XL) 24 hr tablet 100 mg       Caring Physician: Beatriz Painting MD  [facility] [place]  Pager Contact: see Physician sticky note[JB1.1]       Revision History        User Key Date/Time User Provider Type Action    > JB1.4 4/10/2017  5:06 PM Beatriz Painting MD Resident Sign     JB1.3 4/10/2017  5:02 PM Beatriz Painting MD Resident      JB1.5 4/10/2017  9:42 AM Mert  "Beatriz Das MD Resident      JB1.6 4/10/2017  8:58 AM Beatriz Painting MD Resident      JB1.2 4/10/2017  8:54 AM Beatriz Painting MD Resident      JB1.1 4/10/2017  6:22 AM Beatriz Painting MD Resident             Progress Notes by Alta Villa MSW at 4/10/2017  9:58 AM     Author:  Alta Villa MSW Service:  (none) Author Type:      Filed:  4/10/2017 10:06 AM Date of Service:  4/10/2017  9:58 AM Note Created:  4/10/2017  9:58 AM    Status:  Signed :  Alta Villa MSW ()         Social Work: Assessment with Discharge Plan    Patient Name:  Betty Tee  :  1940  Age:  76 year old  MRN:  [id number]  Risk/Complexity Score:  Filed Complexity Screen Score: 9  Completed assessment with:  Nghia/POA, chart review, BRAD paged PT Archana HINOJOSA @ 9:45 am 4/10 with noted concerns re: Dc to home vs tcu  Presenting Information   Reason for Referral:  Discharge plan  Date of Intake:  April 10, 2017  Referral Source:  Family  Decision Maker:  Self  Alternate Decision Maker:  AJCK Moss  Health Care Directive:  Copy in Chart  Living Situation:  House  Previous Functional Status:  Independent  Patient and family understanding of hospitalization:  Patient and POA endorse a slow gradual decline in health and functioning over the last few weeks. They feel strongly patient should go to rehab prior to returning home.  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\"  Adjustment to Illness:  Patient is very anxious to DC home. She and nghia state she is uncomfortable managing her diabetes and needs time to learn how to control it with supervision. They also state patient lives in a very small house that she cannot use her walker in-she does not use at baseline, but is currently requiring. They also report patient is home alone during the days, and cannot do the stairs in the home. They both feel strongly " "patient could not safely be home after DC, they continue with the house has only one bathroom in a tight area and patient cannot navigate without walker, but walker will not fit. Feel strongly patient needs TCU at [place].    Physical Health  Reason for Admission:    1. Pneumonia due to infectious organism, unspecified laterality, unspecified part of lung    2. Atrial fibrillation with rapid ventricular response (H)    3. Fever, unspecified    4. Other pneumonia, unspecified organism    5. Atrial fibrillation, unspecified type (H)    6. Essential hypertension, malignant    7. Personal history of venous thrombosis and embolism    8. Long term (current) use of anticoagulants      Services Needed/Recommended:  TCU    Mental Health/Chemical Dependency  Diagnosis:  na  Support/Services in Place:    Services Needed/Recommended:      Support System  Significant relationship at present time:  Nghia SANTIAGO  Family of origin is available for support:  yes  Other support available:  Patient belongs to the [employer], patient lives with Yvette Chaves.  Gaps in support system:  none  Patient is caregiver to:  None     Provider Information   Primary Care Physician:  Ilene Tristan   [phone]   Clinic:  [facility] [address] [facility]  [address] / [place]*      :  chau    Financial   Income Source:  Social security/[employer] programs/nun  Financial Concerns:  none  Insurance:    Payor/Plan Subscriber Name Rel Member # Group #   MEDICARE - MEDICARE JEAN KENNEDY  [id number]       ATTN CLAIMS, [address]   BCBS - [place]BS [place] JEAN KENNEDY  [id number]      [address] [address]       Discharge Plan   Patient and family discharge goal:  Strong preference for TCU, \"Afraid to go home from here\"  Provided education on discharge plan:  YES  Patient agreeable to discharge plan:  Requested SW speak with PT and discuss recs  A list of Medicare Certified Facilities was provided to the patient and/or " family to encourage patient choice. Patient's choices for facility are:  [facility], [facility], [facility]  Will NH provide Skilled rehabilitation or complex medical:  YES  General information regarding anticipated insurance coverage and possible out of pocket cost was discussed. Patient and patient's family are aware patient may incur the cost of transportation to the facility, pending insurance payment: YES  Barriers to discharge:  recs for TCU    Discharge Recommendations   Anticipated Disposition:  TBD, pending PT today  Transportation Needs:  Family:  POA Nghia- or medical   Name of Transportation Company and Phone:  tbd    Additional comments   SW paged PT informing of patient/family concerns.    Alta KAUFMAN, MSW  5B  (Medical/Surgical)  Phone: [phone]  Pager: [phone][BZ1.1]          Revision History        User Key Date/Time User Provider Type Action    > BZ1.1 4/10/2017 10:06 AM Alta Villa MSW  Sign            Progress Notes by Edda Elias RN at 4/9/2017  6:39 PM     Author:  Edda Elias RN Service:  CRRT RN Author Type:  Registered Nurse    Filed:  4/9/2017  6:40 PM Date of Service:  4/9/2017  6:39 PM Note Created:  4/9/2017  6:39 PM    Status:  Signed :  Edda Elias RN (Registered Nurse)         Patient transferred to  via wheelchair off monitor.  Belongings packed by friends. Of importance, patient had cell phone, robe, and glasses.[KW1.1]       Revision History        User Key Date/Time User Provider Type Action    > KW1.1 4/9/2017  6:40 PM Edda Elias, RN Registered Nurse Sign            Progress Notes by Coty Moreno MD at 4/8/2017  9:40 AM     Author:  Coty Moreno MD Service:  Family Medicine Author Type:  Physician    Filed:  4/8/2017  3:25 PM Date of Service:  4/8/2017  9:40 AM Note Created:  4/8/2017  9:40 AM    Status:  Attested :  Coty Moreno MD (Physician)    Cosigner:  Abhishek Elizabeth  MD at 4/9/2017  7:08 AM        Attestation signed by Abhishek Elizabeth MD at 4/9/2017  7:08 AM        Attestation:  This patient has been seen and evaluated by me, Abhishek Elizabeth on 4/8/17.  I saw and discussed the case with the resident Dr Moreno and the care team. I agree with the findings and plan in this note. I have reviewed today's vital signs, medications, laboratory results.  Blanca findings: Betty doing much better today from the respiratory stand point. Our goals before discharge are a better BG control with good diabetic education. She remains hyperglycemic partly induced by steroids.   Code for today's visit :56140  Inpatient Subsequent Moderate complexity/severity  Abhishek Elizabeth MD  [facility]                                           [facility] - Inpatient daily progress note    Date of admission: 4/2/2017  Date of service: 4/[AR1.1]8[AR1.2]/2017.           Assessment and Plan:      Betty Tee is a 76 year old who was admitted A[facility] and  for evaluation of persistent cough, dizziness and found to be positive for influenza[AR1.1]. She developed an[AR1.2]  acute hypoxic respiratory failure 4/6[AR1.1] that is due to pulmonary congestion.[AR1.2]     Patient Active Problem List   Diagnosis     Temporomandibular joint disorder     Diverticulitis of colon     Disorder of bone and cartilage     Osteoarthritis     Alcohol abuse, in remission     Restless legs syndrome (RLS)     Major depressive disorder, recurrent episode, moderate     Essential hypertension with goal blood pressure less than 140/90     CARDIOVASCULAR SCREENING; LDL GOAL LESS THAN 130     Sciatica     Advanced directives, counseling/discussion     Colouterine fistula     Atrial fibrillation with controlled ventricular response (H)     Left ventricular hypertrophy     Health Care Home     Insomnia     Joint pains     Allergic reaction caused by a drug - likely plaquenil     Breast fibroadenoma     DVT (deep  venous thrombosis) (H)     Fatty liver disease, nonalcoholic     Rib pain     Neck mass     Gastroesophageal reflux disease without esophagitis     Enlarged lymph node     Sjogren's syndrome (H)     ANGELICA (obstructive sleep apnea)     ILD (interstitial lung disease) (H)     Lower GI bleed     Acute and chronic respiratory failure with hypoxia (H)     Acute edema of lung (H)     (HFpEF) heart failure with preserved ejection fraction (H)     Type 2 diabetes mellitus without complication, without long-term current use of insulin (H)     CAP (community acquired pneumonia)[AR1.3]         ## Acute hypoxic respiratory failure[AR1.1], pulmonary congestion[AR1.2] :  Improving[AR1.1]  Multifactor[AR1.2]ial[AR1.4] from acute decompensation of her HFpEF in setting of acute respiratory illness and holding her diuretics[AR1.2] in context of ILD.[AR1.4]  Her oxygen requirement has decreased after diuresis[AR1.2] . Currently not needing oxygen at rest but needs oxygen support when walking.[AR1.4]    Plan:  -[AR1.1] Will continue home Lasix 40 gm BID and Spironolactone 25 mg daily   -To continue Metoprolol   -Daily weights  -Input and Output   -IV Lasix as needed[AR1.2]     ## HFpEF,[AR1.1] with recent decompensation, improved[AR1.4]   Plan:  -[AR1.1] as above   -Low salt diet     ##Possible Hospital Acquired Pneumonia   ##Influenza:  ##ILD   ##Reactive Airway   Patient on last day of Tamiflu.  At risk for Staph Pneumonia but negative nasal  MRSA . No red flags for Staph pneumonia. Antibiotics was broadened during acute respiratory failure. Her  blood culture has been negative and pro calcitonin level is not suggestive of a bacterial infection .  However  her xray today showed more haziness on the left lung field that superimposed bacterial infection can not be ruled out.     Plan:   -DC IV antibiotics and switch to Levofloxacin[AR1.4]   - Follow up blood cultures   -[AR1.1] B[AR1.4]ipap[AR1.1] at night[AR1.4]  and/or HFNC to keep O2  > 92%  -[AR1.1] On baseline prednisone 10 mg daily , we have increased this to 40 mg during decompensation . -Will start to wean, go down by 10 mg every 3 days until at 10 mg daily .[AR1.4]   - Continue fluticasone daily  - Continue duonebs q 4 hours  - Continue albuterol nebs q 2 hours prn  - Lactobacillus[AR1.1]  -Robitussin for cough[AR1.4]     ## Dizziness/AMS- resolved  Likely multifactorial : Hypoxia and acute respiratory illness. Negative ACS and stroke work up.[AR1.1]   Plan :   Observe, address underlying etiology[AR1.4]      ## A Fib ,rate controlled   INR goal 2.0-3.0, patient currently at goal[AR1.1]  Plan:   -continue Metoprolol and warfarin[AR1.4]     ## HTN , within acceptable ranges     Plan:   - Cont metoprolol 100 mg daily.  - Continue spironolactone 25 mg daily    ## Diabetes Mellitus- uncontrolled  Hgb A1c 14.3 (4/2017).[AR1.1]  Was started on an Insulin drip yesterday for uncontrolled hyperglycemia secondary to recent increase steroid dose. Sugars not stable .[AR1.4]     Plan:   -[AR1.1] Transitioned to SC Insulin as follows:   Lantus 19 units. Will stop the IV drip 2 hours after  SC Insulin     Prandial Coverage :   1 unit /13 gram CHO  MSSI  Will titrate[AR1.4]     ##ANGELICA  Plan:   -Bipap at night     ##Sjorgren Syndrome, Stable   Plan:   -Will observe[AR1.5]     Daily cares -   F:none  E:stable  N:regular diet  Lines:PIV  Activity:-Up as tolerated  CODE:Full Code  Prophylaxis:On warfarin  PCP communication:  - Ilene Tristan     Dispo: Expected discharge date 2-3 days pending on clinical improvement.             Interval History:   Betty Tee[AR1.1] was started on an insulin drip to hyperglycemia control . She is reporting that her breathing is better. No acute events overnight. Nurses noted were reviewed.[AR1.4]               Review of Systems:   Review of Systems   Constitutional: Negative for chills and fever.   HENT: Negative for congestion.    Respiratory: Positive for  cough. Negative for shortness of breath.    Cardiovascular: Negative for chest pain.   Gastrointestinal: Negative for abdominal pain, constipation, diarrhea, nausea and vomiting.   Neurological: Negative for dizziness, light-headedness and headaches.   Psychiatric/Behavioral: Negative for confusion. The patient is not nervous/anxious.             Physical Exam (Resident / Clinician):   Vitals were reviewed[AR1.1]  Temp: 98.2  F (36.8  C) Temp src: Oral BP: (!) 136/103   Heart Rate: 101 Resp: 20 SpO2: 96 % O2 Device: None (Room air)[AR1.3] Oxygen Delivery: Bipap 10/5, 40% FiO2    Physical Exam   Constitutional: She is oriented to person, place, and time. She appears well-developed and well-nourished. No distress.[AR1.1]   Sitting in chair, not in distress[AR1.4]   Neck: Normal range of motion.[AR1.1] No JVD[AR1.4] present.   Cardiovascular: Normal rate and[AR1.1] intact distal pulses[AR1.4].    No murmur heard.  Pulmonary/Chest:[AR1.1] Effort normal[AR1.4]. No[AR1.1] stridor[AR1.4]. No respiratory distress. She has[AR1.1] wheezes[AR1.4].   Crackles at bases bilaterally.   Abdominal: Soft. Bowel sounds are normal. She exhibits no distension. There is no tenderness.   Musculoskeletal: Normal range of motion. She exhibits no edema or tenderness.   Neurological: She is alert and oriented to person, place, and time.   Skin: Skin is warm and dry.   Psychiatric: She has a normal mood and affect. Her behavior is normal. Judgment and thought content normal.   Vitals reviewed.          Data:   ROUTINE LABS (Last four results)  CMP[AR1.1]    Recent Labs  Lab 04/08/17  0345 04/07/17  0313 04/06/17  1944 04/06/17  0723 04/05/17  0726  04/02/17  2147    140 140 140 140  < > 143   POTASSIUM 4.0 4.1 3.9 4.0 4.0  < > 3.3*   CHLORIDE 107 105 107 107 106  < > 108   CO2 24 24 25 23 25  < > 23   ANIONGAP 12 10 8 10 8  < > 12   * 229* 291* 213* 130*  < > 213*   BUN 14 14 12 11 13  < > 7   CR 0.55 0.61 0.61 0.68 0.68  < > 0.58    GFRESTIMATED >90Non  GFR Calc >90Non  GFR Calc >90Non  GFR Calc 83 85  < > >90Non  GFR Calc   GFRESTBLACK >90African American GFR Calc >90African American GFR Calc >90African American GFR Calc >90African American GFR Calc >90African American GFR Calc  < > >90African American GFR Calc   CLAUDY 8.1* 7.8* 7.8* 8.3* 8.6  < > 8.2*   MAG 2.3  --  2.0 2.1 2.0  < >  --    PHOS  --   --  2.2* 3.2 2.4*  --   --    PROTTOTAL  --   --   --   --   --   --  7.0   ALBUMIN  --   --   --   --   --   --  3.0*   BILITOTAL  --   --   --   --   --   --  0.6   ALKPHOS  --   --   --   --   --   --  72   AST  --   --   --   --   --   --  29   ALT  --   --   --   --   --   --  19   < > = values in this interval not displayed.[AR1.3]  CBC[AR1.1]    Recent Labs  Lab 04/08/17 0345 04/07/17 0313 04/06/17 0723 04/05/17  0726   WBC 6.8 4.4 5.8 6.3   RBC 3.88 4.19 4.48 4.09   HGB 8.1* 8.8* 9.5* 8.4*   HCT 28.0* 31.6* 33.4* 30.4*   MCV 72* 75* 75* 74*   MCH 20.9* 21.0* 21.2* 20.5*   MCHC 28.9* 27.8* 28.4* 27.6*   RDW 19.0* 19.1* 19.0* 18.7*   * 193 216 191[AR1.3]     INR[AR1.1]    Recent Labs  Lab 04/08/17 0345 04/07/17 0313 04/06/17 0723 04/05/17  0726   INR 3.09* 2.03* 2.02* 1.67*[AR1.3]     CRP[AR1.1]    Recent Labs  Lab 04/06/17  0723 04/04/17  0858   CRP 64.0* 54.0*         Recent Labs  Lab 04/06/17  1055 04/06/17  1028 04/03/17  1045 04/03/17  0740 04/02/17  2327 04/02/17  2211 04/02/17  2147   CULT No growth after 2 days No growth after 2 days Light growth Normal juan >10 Squamous epithelial cells/low power field indicates oral contamination. Please recollect.Canceled, Test credited* 10,000 to 50,000 colonies/mL mixed urogenital juan No growth No growth[AR1.3]             Medications:[AR1.1]     Current Facility-Administered Medications   Medication     levofloxacin (LEVAQUIN) tablet 750 mg     insulin glargine (LANTUS) injection 19 Units     insulin aspart  (NovoLOG) inj (RAPID ACTING)     insulin aspart (NovoLOG) inj (RAPID ACTING)     [START ON 4/9/2017] insulin aspart (NovoLOG) inj (RAPID ACTING)     insulin aspart (NovoLOG) inj (RAPID ACTING)     insulin aspart (NovoLOG) inj (RAPID ACTING)     insulin aspart (NovoLOG) inj (RAPID ACTING)     furosemide (LASIX) tablet 40 mg     predniSONE (DELTASONE) tablet 40 mg     pantoprazole (PROTONIX) EC tablet 40 mg     insulin 1 unit/mL in saline (NovoLIN, HumuLIN Regular) drip - ADULT IV Infusion     glucose 40 % gel 15-30 g    Or     dextrose 50 % injection 25-50 mL    Or     glucagon injection 1 mg     dextrose 10 % 1,000 mL infusion     ipratropium - albuterol 0.5 mg/2.5 mg/3 mL (DUONEB) neb solution 3 mL     oseltamivir (TAMIFLU) capsule 75 mg     traZODone (DESYREL) half-tab 25-50 mg     spironolactone (ALDACTONE) tablet 25 mg     acetaminophen (TYLENOL) tablet 1,000 mg    Or     acetaminophen (TYLENOL) Suppository 650 mg     lactobacillus rhamnosus (GG) (CULTURELL) capsule 1 capsule     fluticasone (FLONASE) 50 MCG/ACT spray 1-2 spray     fluticasone furoate (ARNUITY ELLIPTA) 200 MCG/ACT inhalation powder 1 puff     ketoconazole (NIZORAL) 2 % cream     polyethylene glycol (MIRALAX/GLYCOLAX) Packet 17 g     montelukast (SINGULAIR) tablet 10 mg     PARoxetine (PAXIL) tablet 10 mg     Warfarin Therapy Reminder (Check START DATE - warfarin may be starting in the FUTURE)     naloxone (NARCAN) injection 0.1-0.4 mg     lidocaine 1 % 1 mL     lidocaine (LMX4) kit     sodium chloride (PF) 0.9% PF flush 3 mL     sodium chloride (PF) 0.9% PF flush 3 mL     Patient is already receiving anticoagulation with heparin, enoxaparin (LOVENOX), warfarin (COUMADIN)  or other anticoagulant medication     albuterol neb solution 2.5 mg     guaiFENesin-dextromethorphan (ROBITUSSIN DM) 100-10 MG/5ML syrup 10 mL     potassium chloride SA (K-DUR/KLOR-CON M) CR tablet 20-40 mEq     potassium chloride (KLOR-CON) Packet 20-40 mEq     potassium  chloride 10 mEq in 100 mL intermittent infusion     potassium chloride 10 mEq in 100 mL intermittent infusion with 10 mg lidocaine     potassium chloride 20 mEq in 50 mL intermittent infusion     metoprolol (TOPROL-XL) 24 hr tablet 100 mg[AR1.3]       Caring Physician:[AR1.1] Coty Moreno MD[AR1.3]  [facility]  Pager Contact: see Physician sticky note[AR1.1]       Revision History        User Key Date/Time User Provider Type Action    > AR1.5 4/8/2017  3:25 PM Coty Moreno MD Physician Sign     AR1.3 4/8/2017  3:21 PM Coty Moreno MD Physician Sign     AR1.4 4/8/2017  2:55 PM Coty Moreno MD Physician      AR1.2 4/8/2017 10:27 AM Coty Moreno MD Physician      AR1.1 4/8/2017  9:40 AM Coty Moreno MD Physician             Progress Notes by Héctor Cornejo PT at 4/8/2017  2:10 PM     Author:  Héctor Cornejo, PT Service:  (none) Author Type:  Physical Therapist    Filed:  4/8/2017  2:10 PM Date of Service:  4/8/2017  2:10 PM Note Created:  4/8/2017  2:10 PM    Status:  Signed :  Héctor Cornejo PT (Physical Therapist)          04/08/17 1100   Quick Adds   Type of Visit Initial PT Evaluation       Present no   Living Environment   Lives With friend(s)   Living Arrangements house   Home Accessibility stairs to enter home   Number of Stairs to Enter Home 3  (1 rail)   Number of Stairs Within Home 10  (1 rail)   Transportation Available car;family or friend will provide   Living Environment Comment friend/roommate available to help as needed   Self-Care   Dominant Hand right   Usual Activity Tolerance good   Current Activity Tolerance moderate   Regular Exercise no   Equipment Currently Used at Home oxygen;other (see comments)  (has 4WW but does not use)   Functional Level Prior   Ambulation 0-->independent   Transferring 0-->independent   Toileting 0-->independent   Bathing 0-->independent   Dressing 0-->independent   Eating 0-->independent   Communication  0-->understands/communicates without difficulty   Swallowing 0-->swallows foods/liquids without difficulty   Cognition 0 - no cognition issues reported   Fall history within last six months yes   Number of times patient has fallen within last six months 1  (fell off chair when chair leg broke)   Prior Functional Level Comment independent with all functional mobility and ADLs,  used home O2 prn, has 4WW but does not use   General Information   Onset of Illness/Injury or Date of Surgery - Date 04/02/17   Referring Physician Cynthia Sánchez MD   Patient/Family Goals Statement return home   Pertinent History of Current Problem (include personal factors and/or comorbidities that impact the POC) Betty Tee is a 76 year old who was admitted AMS and  for evaluation of persistent cough, dizziness and found to be positive for influenza, went into acute hypoxic respiratory failure 4/6.   Cognitive Status Examination   Orientation orientation to person, place and time   Level of Consciousness alert   Follows Commands and Answers Questions 100% of the time   Personal Safety and Judgment intact   Pain Assessment   Patient Currently in Pain No   Integumentary/Edema   Integumentary/Edema no deficits were identifed   Posture    Posture Forward head position;Protracted shoulders   Range of Motion (ROM)   ROM Comment B LE grossly WNL   Strength   Strength Comments B LE grossly 5/5   Bed Mobility   Bed Mobility Comments independent   Transfer Skills   Transfer Comments sit > stand SBA   Gait   Gait Comments ambulate in room with SBA   Balance   Balance Comments stand without UE support   Sensory Examination   Sensory Perception no deficits were identified   General Therapy Interventions   Planned Therapy Interventions balance training;gait training;progressive activity/exercise   Clinical Impression   Criteria for Skilled Therapeutic Intervention yes, treatment indicated   PT Diagnosis impaired functional mobility 2/2  "influenza   Influenced by the following impairments decreased balance, decreased functional endurance   Functional limitations due to impairments impaired gait   Clinical Presentation Evolving/Changing   Clinical Presentation Rationale clinical judgment   Clinical Decision Making (Complexity) Low complexity   Therapy Frequency` daily   Predicted Duration of Therapy Intervention (days/wks) 3 days   Anticipated Discharge Disposition Home with Assist   Risk & Benefits of therapy have been explained Yes   Patient, Family & other staff in agreement with plan of care Yes   [facility] AM-PAC  \"6 Clicks\" V.2 Basic Mobility Inpatient Short Form   1. Turning from your back to your side while in a flat bed without using bedrails? 4 - None   2. Moving from lying on your back to sitting on the side of a flat bed without using bedrails? 4 - None   3. Moving to and from a bed to a chair (including a wheelchair)? 4 - None   4. Standing up from a chair using your arms (e.g., wheelchair, or bedside chair)? 4 - None   5. To walk in hospital room? 4 - None   6. Climbing 3-5 steps with a railing? 3 - A Little   Basic Mobility Raw Score (Score out of 24.Lower scores equate to lower levels of function) 23   Total Evaluation Time   Total Evaluation Time (Minutes) 7[JD1.1]        Revision History        User Key Date/Time User Provider Type Action    > JD1.1 4/8/2017  2:10 PM Héctor Cornejo, PT Physical Therapist Sign                  Procedure Notes     No notes of this type exist for this encounter.         Progress Notes - Therapies (Notes from 04/08/17 through 04/11/17)      Progress Notes by Héctor Cornejo PT at 4/8/2017  2:10 PM     Author:  Héctor Cornejo PT Service:  (none) Author Type:  Physical Therapist    Filed:  4/8/2017  2:10 PM Date of Service:  4/8/2017  2:10 PM Note Created:  4/8/2017  2:10 PM    Status:  Signed :  Héctor Cornejo PT (Physical Therapist)          04/08/17 1100   Quick Adds   Type " of Visit Initial PT Evaluation       Present no   Living Environment   Lives With friend(s)   Living Arrangements house   Home Accessibility stairs to enter home   Number of Stairs to Enter Home 3  (1 rail)   Number of Stairs Within Home 10  (1 rail)   Transportation Available car;family or friend will provide   Living Environment Comment friend/roommate available to help as needed   Self-Care   Dominant Hand right   Usual Activity Tolerance good   Current Activity Tolerance moderate   Regular Exercise no   Equipment Currently Used at Home oxygen;other (see comments)  (has 4WW but does not use)   Functional Level Prior   Ambulation 0-->independent   Transferring 0-->independent   Toileting 0-->independent   Bathing 0-->independent   Dressing 0-->independent   Eating 0-->independent   Communication 0-->understands/communicates without difficulty   Swallowing 0-->swallows foods/liquids without difficulty   Cognition 0 - no cognition issues reported   Fall history within last six months yes   Number of times patient has fallen within last six months 1  (fell off chair when chair leg broke)   Prior Functional Level Comment independent with all functional mobility and ADLs,  used home O2 prn, has 4WW but does not use   General Information   Onset of Illness/Injury or Date of Surgery - Date 04/02/17   Referring Physician Cynthia Sánchez MD   Patient/Family Goals Statement return home   Pertinent History of Current Problem (include personal factors and/or comorbidities that impact the POC) Betty Tee is a 76 year old who was admitted AMS and  for evaluation of persistent cough, dizziness and found to be positive for influenza, went into acute hypoxic respiratory failure 4/6.   Cognitive Status Examination   Orientation orientation to person, place and time   Level of Consciousness alert   Follows Commands and Answers Questions 100% of the time   Personal Safety and Judgment intact   Pain  "Assessment   Patient Currently in Pain No   Integumentary/Edema   Integumentary/Edema no deficits were identifed   Posture    Posture Forward head position;Protracted shoulders   Range of Motion (ROM)   ROM Comment B LE grossly WNL   Strength   Strength Comments B LE grossly 5/5   Bed Mobility   Bed Mobility Comments independent   Transfer Skills   Transfer Comments sit > stand SBA   Gait   Gait Comments ambulate in room with SBA   Balance   Balance Comments stand without UE support   Sensory Examination   Sensory Perception no deficits were identified   General Therapy Interventions   Planned Therapy Interventions balance training;gait training;progressive activity/exercise   Clinical Impression   Criteria for Skilled Therapeutic Intervention yes, treatment indicated   PT Diagnosis impaired functional mobility 2/2 influenza   Influenced by the following impairments decreased balance, decreased functional endurance   Functional limitations due to impairments impaired gait   Clinical Presentation Evolving/Changing   Clinical Presentation Rationale clinical judgment   Clinical Decision Making (Complexity) Low complexity   Therapy Frequency` daily   Predicted Duration of Therapy Intervention (days/wks) 3 days   Anticipated Discharge Disposition Home with Assist   Risk & Benefits of therapy have been explained Yes   Patient, Family & other staff in agreement with plan of care Yes   [facility] AM-PAC  \"6 Clicks\" V.2 Basic Mobility Inpatient Short Form   1. Turning from your back to your side while in a flat bed without using bedrails? 4 - None   2. Moving from lying on your back to sitting on the side of a flat bed without using bedrails? 4 - None   3. Moving to and from a bed to a chair (including a wheelchair)? 4 - None   4. Standing up from a chair using your arms (e.g., wheelchair, or bedside chair)? 4 - None   5. To walk in hospital room? 4 - None   6. Climbing 3-5 steps with a railing? 3 - A Little   Basic " Mobility Raw Score (Score out of 24.Lower scores equate to lower levels of function) 23   Total Evaluation Time   Total Evaluation Time (Minutes) 7[JD1.1]        Revision History        User Key Date/Time User Provider Type Action    > JD1.1 4/8/2017  2:10 PM Héctor Cornejo, PT Physical Therapist Sign

## 2017-04-02 NOTE — IP AVS SNAPSHOT
Betty Dahl #[id number] (CSN: [id number])  (76 year old F)  (Adm: 17)     [id number]               [address] [place] [address] BANK: [phone]            Patient Demographics     Patient Name Sex          Age SSN Address Phone    Randal, Betty VILLALOBOS Female 1940 (76 year old) [id number]  [facility] [address] [phone] (Home) *Preferred*  [phone] (Mobile)      Emergency Contact(s)     Name Relation Home Work Mobile    Nghia Carlson [phone]    Yvette Chaves Other [phone]        Admission Information     Attending Provider Admitting Provider Admission Type Admission Date/Time    Lenny Greenwood MD Borchert, Meghann Carolina MD Emergency 17  2147    Discharge Date Hospital Service Auth/Cert Status Service Area     [facility]    Unit Room/Bed Admission Status        U5A 5209/5209-01 Admission (Confirmed)       Admission     Complaint    CAP (community acquired pneumonia)      Hospital Account     Name Acct ID Class Status Primary Coverage    Betty Dahl [id number] Inpatient Open MEDICARE - MEDICARE            Guarantor Account (for Hospital Account #[id number])     Name Relation to Pt Service Area Active? Acct Type    Betty Dahl  FCS Yes Personal/Family    Address Phone          [address] [place] [address] [phone](H)  NONE(O)              Coverage Information (for Hospital Account #[id number])     1. MEDICARE/MEDICARE     F/O Payor/Plan Precert #    MEDICARE/MEDICARE     Subscriber Subscriber #    Betty Dahl [id number]    Address Phone    ATTN CLAIMS  [address]  [facility] [place] [address] [phone]          2. [place]     F/O Payor/Plan Precert #    [place]     Subscriber Subscriber #    Betty Dahl [id number]    Address Phone    [address] [address]  [place] [address] [phone]                                         "              INTERAGENCY TRANSFER FORM - PHYSICIAN ORDERS   4/2/2017                       [address] [facility] [facility]: [phone]            Attending Provider: Lenny Greenwood MD     Allergies:  Augmentin, Codeine, Phenobarbital    Infection:  None   Service:  FAMILY MEDIC    Ht:  1.695 m (5' 6.73\")   Wt:  93.4 kg (205 lb 14.4 oz)   Admission Wt:  92.8 kg (204 lb 8 oz)    BMI:  32.51 kg/m 2   BSA:  2.1 m 2            ED Clinical Impression     Diagnosis Description Comment Added By Time Added    Pneumonia due to infectious organism, unspecified laterality, unspecified part of lung [J18.9] Pneumonia due to infectious organism, unspecified laterality, unspecified part of lung [J18.9]  Sole Ibarra MD 4/2/2017 10:57 PM    Atrial fibrillation with rapid ventricular response (H) [I48.91] Atrial fibrillation with rapid ventricular response (H) [I48.91]  Sole Ibarra MD 4/2/2017 10:58 PM    Fever, unspecified [R50.9] Fever, unspecified [R50.9]  Sole Ibarra MD 4/2/2017 10:58 PM      Hospital Problems as of 4/11/2017              [facility] Class Noted POA    CAP (community acquired pneumonia) Medium  4/3/2017 Yes    HCAP (healthcare-associated pneumonia) Medium  4/8/2017 No    Influenza B Medium  4/8/2017 Yes    Heart failure, chronic, with acute decompensation (H) Medium  4/8/2017 No    Type 2 diabetes mellitus with hyperglycemia (H) Medium  4/8/2017 Yes      Non-Hospital Problems as of 4/11/2017              [facility] Class Noted    Temporomandibular joint disorder Low  11/24/2003    Diverticulitis of colon High  9/24/2004    Disorder of bone and cartilage Low  9/24/2004    Osteoarthritis Low  9/24/2004    Alcohol abuse, in remission Low  9/24/2004    Restless legs syndrome (RLS) Medium  9/6/2007    Major depressive disorder, recurrent episode, moderate Medium  Unknown    Essential hypertension with goal blood pressure less than 140/90 Medium  10/19/2010    CARDIOVASCULAR SCREENING; LDL GOAL LESS THAN 130 Low  " 10/31/2010    Sciatica Medium  11/3/2010    Advanced directives, counseling/discussion Low  11/1/2011    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    Atrial fibrillation with controlled ventricular response (H) High  11/4/2011    Left ventricular hypertrophy Medium  11/4/2011    Health Care Home Low  2/9/2012    Insomnia   5/26/2012    Joint pains   1/8/2013    Allergic reaction caused by a drug - likely plaquenil   4/30/2013    Breast fibroadenoma   8/18/2014    DVT (deep venous thrombosis) (H)   9/15/2014    Fatty liver disease, nonalcoholic Medium  9/24/2014    Rib pain   10/31/2014    Neck mass   4/28/2015    Gastroesophageal reflux disease without esophagitis Medium  7/23/2015    Enlarged lymph node Medium  8/4/2015    Sjogren's syndrome (H) Medium  10/30/2015    ANGELICA (obstructive sleep apnea)   4/12/2016    ILD (interstitial lung disease) (H) Medium  6/5/2016    Lower GI bleed Medium  6/26/2016    Acute and chronic respiratory failure with hypoxia (H) Medium  7/2/2016    Acute edema of lung (H) Medium  7/4/2016    (HFpEF) heart failure with preserved ejection fraction (H)   8/27/2016    Type 2 diabetes mellitus without complication, without long-term current use of insulin (H) Medium  10/24/2016      Code Status History     Date Active Date Inactive Code Status Order ID Comments User Context    7/4/2016 11:28 AM 4/3/2017  3:53 AM DNR/DNI [id number]  William Kwan MD Outpatient    7/2/2016  2:36 PM 7/4/2016 11:28 AM DNR/DNI [id number]  William Kwan MD Inpatient    6/26/2016  6:05 PM 6/28/2016  4:06 PM Full Code [id number]  Manny Case MD Inpatient    2/8/2012  1:58 PM 6/26/2016  6:05 PM Full Code [id number]  Raj Unger MD Outpatient    2/1/2012  5:24 PM 2/8/2012  1:58 PM Full Code [id number]  Elizabeth Benson RN Inpatient    11/16/2011  2:54 PM 2/1/2012  5:24 PM Full Code [id number]  Mya Fritz Outpatient    11/7/2011 10:08 PM 11/16/2011  2:54 PM Full Code [id number]  [facility],  Monalisa, JASON Inpatient    11/1/2011  3:15 PM 11/7/2011 10:08 PM DNR [id number] DNR form reviewed and documented.  11/1/11 PADDY Gregory Patty Outpatient    10/7/2011  9:37 AM 10/12/2011  4:07 PM Full Code [id number]  Edda Banda, RN Inpatient    11/21/2003  5:31 PM 11/21/2003  5:31 PM  None  Jasmina Mejia Demographics      Current Code Status     Date Active Code Status Order ID Comments User Context       4/3/2017  3:53 AM Full Code [id number]  Mikey Elizabeth MD Inpatient       Summary of Visit     Reason for your hospital stay       You were hospitalized for the influenza and an acute exacerbation of heart failure.  You were also treated for a pneumonia.                Medication Review      START taking        Dose / Directions Comments    atorvastatin 40 MG tablet   Commonly known as:  LIPITOR   Used for:  Type 2 diabetes mellitus with other specified complication (H)        Dose:  40 mg   Take 1 tablet (40 mg) by mouth daily   Quantity:  30 tablet   Refills:  1        guaiFENesin-dextromethorphan 100-10 MG/5ML syrup   Commonly known as:  ROBITUSSIN DM   Used for:  Pneumonia due to infectious organism, unspecified laterality, unspecified part of lung        Dose:  10 mL   Take 10 mLs by mouth every 4 hours as needed for cough   Quantity:  473 mL   Refills:  0        insulin aspart 100 UNIT/ML injection   Commonly known as:  NovoLOG PEN   Used for:  Type 2 diabetes mellitus with other specified complication (H)        Dose:  10 Units   Inject 10 Units Subcutaneous 3 times daily (with meals)   Quantity:  3 mL   Refills:  0        insulin glargine 100 UNIT/ML injection   Commonly known as:  LANTUS   Used for:  Type 2 diabetes mellitus with other specified complication (H)        Dose:  23 Units   Inject 23 Units Subcutaneous every morning (before breakfast)   Quantity:  3 mL   Refills:  1        lactobacillus rhamnosus (GG) capsule   Used for:  Pneumonia due to infectious organism,  unspecified laterality, unspecified part of lung        Dose:  1 capsule   Take 1 capsule by mouth 3 times daily (before meals)   Quantity:  18 capsule   Refills:  0        levofloxacin 500 MG tablet   Commonly known as:  LEVAQUIN   Indication:  Community Acquired Pneumonia   Used for:  Pneumonia due to infectious organism, unspecified laterality, unspecified part of lung        Dose:  500 mg   Start taking on:  4/12/2017   Take 1 tablet (500 mg) by mouth daily   Quantity:  2 tablet   Refills:  0        lisinopril 2.5 MG tablet   Commonly known as:  PRINIVIL/Zestril   Used for:  Essential hypertension, malignant        Dose:  2.5 mg   Take 1 tablet (2.5 mg) by mouth daily   Quantity:  30 tablet   Refills:  0        potassium chloride SA 20 MEQ CR tablet   Commonly known as:  K-DUR/KLOR-CON M        Dose:  20 mEq   Take 1 tablet (20 mEq) by mouth daily   Quantity:  90 tablet   Refills:  0          CONTINUE these medications which may have CHANGED, or have new prescriptions. If we are uncertain of the size of tablets/capsules you have at home, strength may be listed as something that might have changed.        Dose / Directions Comments    acyclovir 5 % ointment   Commonly known as:  ZOVIRAX   This may have changed:  additional instructions   Used for:  Recurrent cold sores        Apply topically 6 times daily   Quantity:  15 g   Refills:  3        fluticasone 50 MCG/ACT spray   Commonly known as:  FLONASE   This may have changed:    - when to take this  - reasons to take this   Used for:  Seasonal allergic rhinitis        Dose:  1-2 spray   Spray 1-2 sprays into both nostrils daily   Quantity:  16 g   Refills:  5        PARoxetine 10 MG tablet   Commonly known as:  PAXIL   This may have changed:    - how much to take  - how to take this  - when to take this  - additional instructions   Used for:  Major depressive disorder, recurrent episode, moderate (H)        TAKE 1 TABLET (10mg) BY MOUTH AT BEDTIME   Quantity:  30  tablet   Refills:  1    Please fill upon patient request.       predniSONE 10 MG tablet   Commonly known as:  DELTASONE   This may have changed:    - how much to take  - how to take this  - when to take this  - additional instructions   Used for:  ILD (interstitial lung disease) (H), Sjogren's syndrome (H)        Take 2 tablets for three days (4/12-14), then take 1 tablet daily (10 mg daily starting 4/15)   Quantity:  90 tablet   Refills:  3          CONTINUE these medications which have NOT CHANGED        Dose / Directions Comments    acyclovir 400 MG tablet   Commonly known as:  ZOVIRAX        Dose:  400 mg   Take 400 mg by mouth See Admin Instructions 5 times daily as needed for outbreaks   Refills:  0        albuterol 108 (90 BASE) MCG/ACT Inhaler   Commonly known as:  PROAIR HFA/PROVENTIL HFA/VENTOLIN HFA   Used for:  ILD (interstitial lung disease) (H)        Dose:  2 puff   Inhale 2 puffs into the lungs every 6 hours   Quantity:  1 Inhaler   Refills:  3        azithromycin 250 MG tablet   Commonly known as:  ZITHROMAX   Used for:  Follicular bronchiolitis (H)        Dose:  250 mg   Take 1 tablet (250 mg) by mouth daily   Quantity:  30 tablet   Refills:  11        COMPRESSION STOCKINGS   Used for:  DVT (deep venous thrombosis), right, Postphlebitic syndrome, Chronic anticoagulation, Atrial fibrillation (H)        Wear compression stockings in affected leg (right leg) or both legs most of the time during the day and take them off at night.   Quantity:  2 each   Refills:  2    Please dispense 2 pairs of knee high graduated compression stockings at the rating of 20-30 mmHg with 2 refills.       fluticasone 220 MCG/ACT Inhaler   Commonly known as:  FLOVENT HFA   Used for:  ILD (interstitial lung disease) (H), Follicular bronchiolitis (H)        Dose:  2 puff   Inhale 2 puffs into the lungs 2 times daily   Quantity:  3 Inhaler   Refills:  3        furosemide 40 MG tablet   Commonly known as:  LASIX   Used for:   Acute edema of lung (H)        Dose:  40 mg   Take 1 tablet (40 mg) by mouth 2 times daily   Quantity:  180 tablet   Refills:  3        ketoconazole 2 % cream   Commonly known as:  NIZORAL   Used for:  Cutaneous candidiasis        Apply topically 2 times daily   Quantity:  60 g   Refills:  1        metFORMIN 500 MG 24 hr tablet   Commonly known as:  GLUCOPHAGE-XR   Used for:  Type 2 diabetes mellitus without complication, without long-term current use of insulin (H)        Dose:  1000 mg   Take 2 tablets (1,000 mg) by mouth daily (with dinner)   Quantity:  180 tablet   Refills:  0        metoprolol 100 MG 24 hr tablet   Commonly known as:  TOPROL-XL   Used for:  Atrial fibrillation with controlled ventricular response (H)        Dose:  100 mg   Take 1 tablet (100 mg) by mouth daily   Quantity:  90 tablet   Refills:  3        montelukast 10 MG tablet   Commonly known as:  SINGULAIR   Used for:  Sjogren's syndrome (H), ILD (interstitial lung disease) (H)        Dose:  10 mg   Take 1 tablet (10 mg) by mouth At Bedtime   Quantity:  90 tablet   Refills:  1        * order for DME   Used for:  Hypoxia, ILD (interstitial lung disease) (H)        Oxygen: Patient requires supplemental Oxygen 4 LPM via nasal canula with activity. Please provide patient with a home unit and with portability capability. Oxygen will be for a lifetime.   Quantity:  1 Device   Refills:  0        * order for DME   Used for:  Hypoxia, ILD (interstitial lung disease) (H)        Oxygen: Patient requires supplemental Oxygen 4 LPM via nasal canula with activity. Please provide patient with POC to improve mobility, okay to titrate for conserving to keep stats above 90%. Please fax results to [phone].  Oxygen will be for a lifetime.   Quantity:  1 Device   Refills:  0        pantoprazole 20 MG EC tablet   Commonly known as:  PROTONIX   Used for:  Gastroesophageal reflux disease without esophagitis        Dose:  20-40 mg   Take 1-2 tablets (20-40 mg)  by mouth daily   Quantity:  60 tablet   Refills:  1    Please fill upon patient request.       polyethylene glycol Packet   Commonly known as:  MIRALAX/GLYCOLAX   Used for:  Constipation, unspecified constipation type        Dose:  17 g   Take 17 g by mouth daily as needed for constipation   Quantity:  7 packet   Refills:  0        spironolactone 25 MG tablet   Commonly known as:  ALDACTONE   Used for:  Chronic diastolic congestive heart failure (H)        Dose:  25 mg   Take 1 tablet (25 mg) by mouth daily   Quantity:  90 tablet   Refills:  3        traZODone 50 MG tablet   Commonly known as:  DESYREL   Used for:  Insomnia due to medical condition        TAKE 1/2-1 TABLET BY MOUTH NIGHTLY AS NEEDED, CAN TITRATE DOWN TO 1/2TAB OR UP TO 2TABS AS NEEDED   Quantity:  60 tablet   Refills:  3        TYLENOL 500 MG tablet   Used for:  Dizziness   Generic drug:  acetaminophen        Dose:  500 mg   Take 500 mg by mouth nightly as needed   Refills:  0        warfarin 7.5 MG tablet   Commonly known as:  COUMADIN   Used for:  Atrial fibrillation with controlled ventricular response (H)        Current dose is 7.5 mg daily.  Dose adjusted per INR result.   Quantity:  100 tablet   Refills:  3        * Notice:  This list has 2 medication(s) that are the same as other medications prescribed for you. Read the directions carefully, and ask your doctor or other care provider to review them with you.            After Care     Activity - Up ad jolanta           Advance Diet as Tolerated       Follow this diet upon discharge: Orders Placed This Encounter      2 Gram Sodium Diet       Daily weights       Call Provider for weight gain of more than 2 pounds per day or 5 pounds per week.       General info for SNF       Length of Stay Estimate: Short Term Care: Estimated # of Days <30  Condition at Discharge: Improving  Level of care:skilled   Rehabilitation Potential: Good  Admission H&P remains valid and up-to-date: Yes  Recent Chemotherapy:  N/A  Use Nursing Home Standing Orders: Yes       Glucose monitor nursing POCT       Before meals and at bedtime       Intake and output       Every shift       Mantoux instructions       Give two-step Mantoux (PPD) Per Facility Policy Yes             Procedures     Oxygen - Nasal cannula       2-3 Lpm by nasal cannula to keep O2 sats 92% or greater.       Oxygen Adult       Renew Home Oxygen Order  Renew previous prescription.  Expected treatment length is indefinite (99 months).  ___________________________  [facility]  Phone  [phone]  Fax  [phone]  ___________________________    Attending Provider: Abhishek Elizabeth MD  Physician signature: See electronic signature associated with these discharge orders  Date of Order: April 5, 2017             Referrals     Occupational Therapy Adult Consult       Evaluate and treat as clinically indicated.    Reason:  Physical deconditioning       Physical Therapy Adult Consult       Evaluate and treat as clinically indicated.    Reason:  Physical deconditioning             Follow-Up Appointment Instructions     Follow Up and recommended labs and tests       Follow up with Nursing home physician on Friday to have electrolytes checked.  The following labs/tests are recommended: BMP, Magnesium, Phosphorus and INR.             Your next 10 appointments already scheduled     Jun 22, 2017  4:30 PM CDT   Lab with  LAB   [facility] ([facility])    [address] [place] [id number] [phone]            Jun 22, 2017  5:00 PM CDT   (Arrive by 4:45 PM)   RETURN HEART FAILURE with Radha Rosa MD   [facility] ([facility])    [address] [place] [id number] [phone]            Jul 05, 2017 11:00 AM CDT   PFT VISIT with  PFL B   [facility] Pulmonary Function Testing ([facility])    [address] [place] [address]  "Floor  [facility] [address]   [phone]            Jul 05, 2017 11:30 AM CDT   (Arrive by 11:15 AM)   Return Interstitial Lung with Meño Walsh MD   [facility] ([facility])    [address] [facility]  3rd Floor  [facility] [address]   [phone]              Statement of Approval     Ordered          04/11/17 1537  I have reviewed and agree with all the recommendations and orders detailed in this document.  EFFECTIVE NOW     Approved and electronically signed by:  Aly Villegas MD                                                 INTERAGENCY TRANSFER FORM - NURSING   4/2/2017                       [address] [facility] BANK: [phone]            Attending Provider: Lenny Greenwood MD     Allergies:  Augmentin, Codeine, Phenobarbital    Infection:  None   Service:  FAMILY MEDIC    Ht:  1.695 m (5' 6.73\")   Wt:  93.4 kg (205 lb 14.4 oz)   Admission Wt:  92.8 kg (204 lb 8 oz)    BMI:  32.51 kg/m 2   BSA:  2.1 m 2            Advance Directives        Does patient have a scanned Advance Directive/ACP document in EPIC?           Yes        Immunizations     Name Date      HERPES ZOSTER 10/05/09     Influenza (High Dose) 3 valent vaccine 11/11/16     Influenza (High Dose) 3 valent vaccine 12/31/15     Influenza (High Dose) 3 valent vaccine 10/14/14     Influenza (High Dose) 3 valent vaccine 10/11/13     Influenza (IIV3) 09/21/12     Influenza (IIV3) 10/17/11     Influenza (IIV3) 09/01/10     Influenza (IIV3) 11/01/06     Influenza (IIV3) 10/28/05     Influenza (IIV3) 10/08/04     Influenza (IIV3) 11/24/03     Pneumococcal (PCV 13) 10/30/15     Pneumococcal 23 valent 11/03/10     TD (ADULT, 7+) 11/05/02     TD (ADULT, 7+) 01/15/93     TDAP Vaccine (Adacel) 11/03/10       ASSESSMENT     Discharge Profile Flowsheet     EXPECTED DISCHARGE     Patient's communication style  spoken language (English or Bilingual) 04/03/17 0358    Expected Discharge Date  " "04/12/17 (TCU) 04/10/17 1048   FINAL RESOURCES      DISCHARGE NEEDS ASSESSMENT     Resources List  DME 04/03/17 0823    Equipment Currently Used at Home  oxygen;other (see comments) (has 4WW but does not use) 04/08/17 1403   DME  [facility] [phone], Fax: [phone] 04/05/17 0830    Transportation Available  car;family or friend will provide 04/08/17 1403   DME Equipment Ordered  respiratory supplies 04/03/17 0823    Equipment Used at Home  colostomy/ostomy supplies 11/15/11 1409   SKIN      FUNCTIONAL LEVEL CURRENT     Inspection  Partial (identify areas NOT inspected) 04/11/17 1121    Change in Functional Status Since Onset of Current Illness/Injury  no 06/26/16 1727   Skin WDL  ex 04/11/17 1121    GASTROINTESTINAL (ADULT,PEDIATRIC,OB)     Skin areas NOT inspected  Coccyx;Sacrum;Buttock, right;Buttock, left;Hip, right;Hip, left 04/11/17 1121    GI WDL  WDL 04/11/17 1121   Skin Color/Characteristics  bruised (ecchymotic) 04/11/17 1121    Abdominal Appearance  rounded 04/09/17 0517   SAFETY      Last Bowel Movement  04/10/17 04/11/17 1121   Safety WDL  WDL 04/11/17 1121    COMMUNICATION ASSESSMENT     Safety Equipment  oxygen flowmeter;manual resusciator with appropriate mask 04/09/17 0517                 Assessment WDL (Within Defined Limits) Definitions           Safety WDL     Effective: 09/28/15    Row Information: <b>WDL Definition:</b> Bed in low position, wheels locked; call light in reach; upper side rails up x 2; ID band on<br> <font color=\"gray\"><i>Item=AS safety wdl>>List=AS safety wdl>>Version=F14</i></font>      Skin WDL     Effective: 09/28/15    Row Information: <b>WDL Definition:</b> Warm; dry; intact; elastic; without discoloration; pressure points without redness<br> <font color=\"gray\"><i>Item=AS skin wdl>>List=AS skin wdl>>Version=F14</i></font>      Vitals     Vital Signs Flowsheet     VITAL SIGNS     VIVIANA COMA SCALE      Temp  98.5  F (36.9  C) 04/11/17 1406   Best Eye Response  " 4-->(E4) spontaneous 04/03/17 0419    Temp src  Oral 04/11/17 1406   Best Motor Response  6-->(M6) obeys commands 04/03/17 0419    Resp  16 04/11/17 1406   Best Verbal Response  5-->(V5) oriented 04/03/17 0419    Pulse  82 04/11/17 0634   Mao Coma Scale Score  15 04/03/17 0419    Heart Rate  86 04/11/17 1406   EKG MONITORING      Pulse/Heart Rate Source  Monitor 04/11/17 1406   Cardiac Regularity  Irregular 04/03/17 0355    BP  127/74 04/11/17 1406   Cardiac Rhythm  Atrial fibrillation 04/03/17 1534    BP Location  Right arm 04/11/17 1406   POSITIONING      OXYGEN THERAPY     Body Position  independently positioning 04/11/17 1125    SpO2  90 % 04/11/17 1406   Head of Bed (HOB)  HOB at 20-30 degrees 04/11/17 1125    O2 Device  None (Room air) 04/11/17 1406   Chair  Upright in chair 04/11/17 1125    FiO2 (%)  4 % 04/07/17 2036   Positioning/Transfer Devices  pillows;in use 04/09/17 0510    Oxygen Delivery  1 LPM 04/09/17 0510   DAILY CARE      PAIN/COMFORT     Activity Type  activity encouraged;ambulated in room;ambulated to bathroom 04/11/17 1125    Patient Currently in Pain  denies 04/11/17 0858   Activity Level of Assistance  independent;assistance, stand-by 04/11/17 1125    Preferred Pain Scale  CAPA (Clinically Aligned Pain Assessment) ([facility] Adults Only) 04/11/17 0452   ECG      Pain Location  -- (with coughing) 04/04/17 1553   ECG Rhythm  Atrial fibrillation 04/09/17 1311    CLINICALLY ALIGNED PAIN ASSESSMENT (CAPA) ([facility] ADULTS ONLY)     Ectopy  PAC 04/09/17 1311    Comfort  negligible pain 04/11/17 0137   Lead Monitored  Lead II;V 1 04/09/17 1311    Change in Pain  getting better 04/08/17 0333   Ectopy Frequency  Rare 04/09/17 1311    Pain Control  fully effective 04/08/17 0333   Equipment  electrodes changed 04/09/17 0041    Functioning  can do everything I need to 04/08/17 0333   POINT OF CARE TESTING      Sleep  normal sleep 04/08/17 0333   Puncture Site   fingertip 04/09/17 0556    HEIGHT AND WEIGHT     Bedside Glucose (mg/dl )   199 mg/dl 04/09/17 0556    Weight  93.4 kg (205 lb 14.4 oz) (standing) 04/11/17 1057                 Patient Lines/Drains/Airways Status    Active LINES/DRAINS/AIRWAYS     Name: Placement date: Placement time: Site: Days: Last dressing change:    Peripheral IV 04/06/17 Right;Lateral Upper forearm;Lower forearm 04/06/17   1048   Upper forearm;Lower forearm   5             Patient Lines/Drains/Airways Status    Active PICC/CVC     None            Intake/Output Detail Report     Date Intake     Output   Net    Shift P.O. I.V. IV Piggyback Total Urine Stool Total       [place] 04/10/17 0700 - 04/10/17 1459 360 3 -- 363 -- -- -- 363    Noc 04/10/17 1500 - 04/10/17 2359 570 250 -- 820 -- -- -- 820    Day 04/11/17 0000 - 04/11/17 0659 240 -- -- 240 -- -- -- 240    [place] 04/11/17 0700 - 04/11/17 1459 240 -- -- 240 600 -- 600 -360    Noc 04/11/17 1500 - 04/11/17 2359 -- -- -- -- -- -- -- 0      Last Void/BM       Most Recent Value    Urine Occurrence 2 at 04/11/2017 0452    Stool Occurrence 1 at 04/09/2017 1100      Case Management/Discharge Planning     Case Management/Discharge Planning Flowsheet     REFERRAL INFORMATION     FINAL RESOURCES      Arrived From  home or self-care 04/03/17 0358   Equipment Currently Used at Home  oxygen;other (see comments) (has 4WW but does not use) 04/08/17 1403    LIVING ENVIRONMENT     Resources List  DME 04/03/17 0823    Lives With  friend(s) 04/08/17 1403   DME  [facility] [phone], Fax: [phone] 04/05/17 0830    Living Arrangements  house 04/08/17 1403   DME Equipment Ordered  respiratory supplies 04/03/17 0823    Provides Primary Care For  no one 06/26/16 1731   / CAREGIVER      COPING/STRESS     Filed Complexity Screen Score  9 04/05/17 0817    Major Change/Loss/Stressor  none 04/03/17 0412   ABUSE RISK SCREEN      EXPECTED DISCHARGE     QUESTION TO PATIENT:  Has a member of your family or a  partner(now or in the past) intimidated, hurt, manipulated, or controlled you in any way?  no 04/02/17 2148    Expected Discharge Date  04/12/17 (TCU) 04/10/17 1048   QUESTION TO PATIENT: Do you feel safe going back to the place where you are living?  yes 04/02/17 2148    DISCHARGE PLANNING     OBSERVATION: Is there reason to believe there has been maltreatment of a vulnerable adult (ie. Physical/Sexual/Emotional abuse, self neglect, lack of adequate food, shelter, medical care, or financial exploitation)?  no 04/02/17 2148    Transportation Available  car;family or friend will provide 04/08/17 1403   (R) MENTAL HEALTH SUICIDE RISK      Outpatient/Agency/Support Group Needs  homecare agency 11/15/11 1409   Are you depressed or being treated for depression?  No 04/03/17 0400    Community Agency Name(S)  -- ([facility] 02/03/12 1559   HOMICIDE RISK      Equipment Used at Home  colostomy/ostomy supplies 11/15/11 1409   Homicidal Ideation  no 04/02/17 2148    Additional Equipment Needed  -- (WOC RN will provide) 11/15/11 1409                       [address] [facility] BANK: [phone]            Medication Administration Report for Betty Tee as of 04/11/17 1548   Legend:    Given Hold Not Given Due Canceled Entry Other Actions    Time Time (Time) Time  Time-Action       Inactive    Active    Linked        Medications 04/05/17 04/06/17 04/07/17 04/08/17 04/09/17 04/10/17 04/11/17    acetaminophen (TYLENOL) tablet 1,000 mg  Dose: 1,000 mg Freq: EVERY 8 HOURS Route: PO  Start: 04/04/17 0945   Admin Instructions: Maximum acetaminophen dose from all sources = 75 mg/kg/day not to exceed 4 gram     0141 (1,000 mg)-Given       1037 (975 mg)-Given       1808 (1,000 mg)-Given        0039 (1,000 mg)-Given              (1329)-Not Given       (1844)-Not Given [C]        0211 (1,000 mg)-Given       (0934)-Not Given       (1809)-Not Given        (0059)-Not Given       (0908)-Not Given       1500 (500  mg)-Given [C]              (2353)-Not Given        (0832)-Not Given       (1616)-Not Given        (0001)-Not Given       (0903)-Not Given       (1705)-Not Given        (0053)-Not Given       (0842)-Not Given       (1546)-Not Given          Or  acetaminophen (TYLENOL) Suppository 650 mg  Dose: 650 mg Freq: EVERY 8 HOURS Route: RE  Start: 04/04/17 0945   Admin Instructions: Maximum acetaminophen dose from all sources = 75 mg/kg/day not to exceed 4 grams/day.                                                                                                                                                                                acetylcysteine (MUCOMYST) 20 % nebulizer solution 2 mL  Dose: 2 mL Freq: 2 TIMES DAILY Route: NEBULIZATION  Start: 04/10/17 0800   Admin Instructions: Nebulization tubing with a FILTER is recommended with acetylcysteine nebs.          0818 (2 mL)-Given       1944 (2 mL)-Given        0916 (2 mL)-Given       [ ] 2000           albuterol neb solution 2.5 mg  Dose: 3 mL Freq: EVERY 2 HOURS PRN Route: NEBULIZATION  PRN Reasons: wheezing,shortness of breath / dyspnea  Start: 04/03/17 0353     0043 (2.5 mg)-Given       0544 (2.5 mg)-Given       1652 (2.5 mg)-Given                atorvastatin (LIPITOR) tablet 40 mg  Dose: 40 mg Freq: DAILY Route: PO  Start: 04/09/17 1030        1445 (40 mg)-Given        0905 (40 mg)-Given        0842 (40 mg)-Given           dextrose 10 % 1,000 mL infusion  Freq: CONTINUOUS PRN Route: IV  PRN Comment: Give if on IV Insulin Infusion, and Parenteral or Enteral nutrition held or cycled off.   Start: 04/07/17 1711   Admin Instructions: Infuse IV D10W at TPN/TF rate whenever nutrition is held or cycled off.               fluticasone (FLONASE) 50 MCG/ACT spray 1-2 spray  Dose: 1-2 spray Freq: DAILY PRN Route: BOTH NOSTRIL  PRN Reason: allergies  Start: 04/03/17 0353              fluticasone furoate (ARNUITY ELLIPTA) 200 MCG/ACT inhalation powder 1 puff  Dose: 1 puff Freq:  DAILY Route: IN  Start: 04/03/17 0800   Admin Instructions: Rinse mouth after use.<br><br>**Formulary alternate for Flovent  - 2 puffs bid <br><br>     0850 (1 puff)-Given        (1000)-Not Given        0920 (1 puff)-Given        (1158)-Not Given        0833 (1 puff)-Given        0906 (1 puff)-Given        0841 (1 puff)-Given           furosemide (LASIX) tablet 40 mg  Dose: 40 mg Freq: 2 TIMES DAILY. Route: PO  Start: 04/08/17 0800       0925 (40 mg)-Given       1655 (40 mg)-Given        0833 (40 mg)-Given       1600-Hold [C]        0905 (40 mg)-Given       1704 (40 mg)-Given        0843 (40 mg)-Given       1546 (40 mg)-Given           glucose 40 % gel 15-30 g  Dose: 15-30 g Freq: EVERY 15 MIN PRN Route: PO  PRN Reason: low blood sugar  Start: 04/07/17 1709   Admin Instructions: Give 15 g for BG 51 to 69 mg/dL IF patient is conscious and able to swallow. Give 30 g for BG less than or equal to 50 mg/dL IF patient is conscious and able to swallow. Do NOT give glucose gel via enteral tube.  IF patient has enteral tube: give apple juice 120 mL (4 oz or 15 g of CHO) via enteral tube for BG 51 to 69 mg/dL.  Give apple juice 240 mL (8 oz or 30 g of CHO) via enteral tube for BG less than or equal to 50 mg/dL.              Or  dextrose 50 % injection 25-50 mL  Dose: 25-50 mL Freq: EVERY 15 MIN PRN Route: IV  PRN Reason: low blood sugar  Start: 04/07/17 1709   Admin Instructions: Use if have IV access, BG less than 70 mg/dL and meet dose criteria below:  Dose if conscious and alert (or disorientated) and NPO = 25 mL  Dose if unconscious / not alert = 50 mL  Vesicant.              Or  glucagon injection 1 mg  Dose: 1 mg Freq: EVERY 15 MIN PRN Route: SC  PRN Reason: low blood sugar  PRN Comment: May repeat x 1 only  Start: 04/07/17 1709   Admin Instructions: May give SQ or IM. IF BG less than or equal to 50 mg/dL and no IV access.  ONLY use glucagon IF patient has NO IV access AND is UNABLE to swallow.                guaiFENesin-dextromethorphan (ROBITUSSIN DM) 100-10 MG/5ML syrup 10 mL  Dose: 10 mL Freq: EVERY 4 HOURS PRN Route: PO  PRN Reason: cough  Start: 04/03/17 0353    0831 (10 mL)-Given       1309 (10 mL)-Given        0108 (10 mL)-Given       0448 (10 mL)-Given        0900 (10 mL)-Given       2305 (10 mL)-Given        2137 (10 mL)-Given        0333 (10 mL)-Given        1212 (10 mL)-Given            insulin aspart (NovoLOG) inj (RAPID ACTING)  Dose: 10 Units Freq: 3 TIMES DAILY WITH MEALS Route: SC  Start: 04/11/17 1200   Admin Instructions: If given at mealtime, must be administered 5 min before meal or immediately after.           1258 (10 Units)-Given       [ ] 1800           insulin aspart (NovoLOG) inj (RAPID ACTING)  Freq: WITH SNACKS OR SUPPLEMENTS Route: SC  PRN Reason: high blood sugar  Start: 04/08/17 0936   Admin Instructions: DOSE:  1  units per 13 grams of carbohydrate. Only chart total amount of units given.  Do not give if pre-prandial glucose is less than 60 mg/dL.  If given at mealtime, must be administered 5 min before meal or immediately after.               insulin glargine (LANTUS) injection 23 Units  Dose: 23 Units Freq: EVERY MORNING BEFORE BREAKFAST Route: SC  Start: 04/10/17 0730         0851 (23 Units)-Given        0829 (23 Units)-Given           ipratropium - albuterol 0.5 mg/2.5 mg/3 mL (DUONEB) neb solution 3 mL  Dose: 3 mL Freq: 2 TIMES DAILY Route: NEBULIZATION  Start: 04/10/17 0800         0818 (3 mL)-Given       1944 (3 mL)-Given        0916 (3 mL)-Given       [ ] 2000           ketoconazole (NIZORAL) 2 % cream  Freq: 2 TIMES DAILY Route: Top  Start: 04/03/17 0800   Admin Instructions: Apply to genital area     (1454)-Not Given       (1944)-Not Given        (1413)-Not Given       2117 ( )-Given        0921 ( )-Given       (2006)-Not Given        0933 ( )-Given       (2005)-Not Given        (0855)-Not Given       (2126)-Not Given        (0903)-Not Given       2035 ( )-Given         "(0844)-Not Given       [ ] 2000           lactobacillus rhamnosus (GG) (CULTURELL) capsule 1 capsule  Dose: 1 capsule Freq: 3 TIMES DAILY BEFORE MEALS Route: PO  Start: 04/04/17 1200   Admin Instructions: Therapeutic interchange for lactobacillus/acidophilus.<br>  Administer at least 2 hours before or after oral antibiotics. Capsules may be opened.     0838 (1 capsule)-Given       1301 (1 capsule)-Given       1808 (1 capsule)-Given        (1000)-Not Given       (1330)-Not Given       (1845)-Not Given        0856 (1 capsule)-Given       1519 (1 capsule)-Given       1808 (1 capsule)-Given        0926 (1 capsule)-Given       1147 (1 capsule)-Given       1655 (1 capsule)-Given        0833 (1 capsule)-Given       1128 (1 capsule)-Given       (1711)-Not Given        1115 (1 capsule)-Given [C]       1704 (1 capsule)-Given       2036 (1 capsule)-Given               0842 (1 capsule)-Given       1249 (1 capsule)-Given       [ ] 1700           lidocaine (LMX4) kit  Freq: EVERY 1 HOUR PRN Route: Top  PRN Reason: pain  PRN Comment: with VAD insertion or accessing implanted port.  Start: 04/03/17 0353   Admin Instructions: Do NOT give if patient has a history of allergy to any local anesthetic or any \"debbi\" product.   Apply 30 minutes prior to VAD insertion or port access.  MAX Dose:  2.5 g (  of 5 g tube)               lidocaine 1 % 1 mL  Dose: 1 mL Freq: EVERY 1 HOUR PRN Route: OTHER  PRN Comment: mild pain with VAD insertion or accessing implanted port  Start: 04/03/17 0353   Admin Instructions: Do NOT give if patient has a history of allergy to any local anesthetic or any \"debbi\" product. MAX dose 1 mL subcutaneous OR intradermal in divided doses.               lisinopril (PRINIVIL/Zestril) tablet 2.5 mg  Dose: 2.5 mg Freq: DAILY Route: PO  Start: 04/09/17 1015        1023 (2.5 mg)-Given        0905 (2.5 mg)-Given        0843 (2.5 mg)-Given           magnesium sulfate 4 g in 100 mL sterile water (premade)  Dose: 4 g Freq: " EVERY 4 HOURS PRN Route: IV  PRN Reason: magnesium supplementation  Start: 04/10/17 1359   Admin Instructions: For serum Mg++ less than 1.6 mg/dL  Give 4 g and recheck magnesium level 2 hours after dose, and next AM.               metFORMIN (GLUCOPHAGE-XR) 24 hr tablet 1,000 mg  Dose: 1,000 mg Freq: DAILY WITH SUPPER Route: PO  Start: 04/09/17 1700   Admin Instructions: DO NOT CRUSH. If the patient receives intravenous, iodinated contrast, hold metformin for 24 hours before AND 48 hours after procedure. Contact pharmacy if no hold order is written.         1714 (1,000 mg)-Given        1815 (1,000 mg)-Given        [ ] 1700           metoprolol (TOPROL-XL) 24 hr tablet 100 mg  Dose: 100 mg Freq: DAILY Route: PO  Start: 04/03/17 0800   Admin Instructions: DO NOT CRUSH. Tablet may be split in half along score line.     0838 (100 mg)-Given        0703 (100 mg)-Given        0855 (100 mg)-Given        0925 (100 mg)-Given        0833 (100 mg)-Given        0907 (100 mg)-Given        0842 (100 mg)-Given           montelukast (SINGULAIR) tablet 10 mg  Dose: 10 mg Freq: AT BEDTIME Route: PO  Start: 04/03/17 2200    1942 (10 mg)-Given        2049 (10 mg)-Given        2006 (10 mg)-Given        2004 (10 mg)-Given        2054 (10 mg)-Given        2036 (10 mg)-Given        [ ] 2000           naloxone (NARCAN) injection 0.1-0.4 mg  Dose: 0.1-0.4 mg Freq: EVERY 2 MIN PRN Route: IV  PRN Reason: opioid reversal  Start: 04/03/17 0353   Admin Instructions: For respiratory rate LESS than or EQUAL to 8.  Partial reversal dose:  0.1 mg titrated q 2 minutes for Analgesia Side Effects Monitoring Sedation Level of 3 (frequently drowsy, arousable, drifts to sleep during conversation).Full reversal dose:  0.4 mg bolus for Analgesia Side Effects Monitoring Sedation Level of 4 (somnolent, minimal or no response to stimulation).               pantoprazole (PROTONIX) EC tablet 40 mg  Dose: 40 mg Freq: EVERY MORNING Route: PO  Start: 04/07/17 0900    Admin Instructions: DO NOT CRUSH.       0856 (40 mg)-Given        0926 (40 mg)-Given        0833 (40 mg)-Given        0903 (40 mg)-Given        0843 (40 mg)-Given           PARoxetine (PAXIL) tablet 10 mg  Dose: 10 mg Freq: AT BEDTIME Route: PO  Start: 04/03/17 2200    1943 (10 mg)-Given        2049 (10 mg)-Given        2006 (10 mg)-Given        2005 (10 mg)-Given         0002 (10 mg)-Given [C]       2036 (10 mg)-Given        [ ] 2000           Patient is already receiving anticoagulation with heparin, enoxaparin (LOVENOX), warfarin (COUMADIN)  or other anticoagulant medication  Freq: CONTINUOUS PRN Route: XX  Start: 04/03/17 0353              polyethylene glycol (MIRALAX/GLYCOLAX) Packet 17 g  Dose: 17 g Freq: DAILY PRN Route: PO  PRN Reason: constipation  Start: 04/03/17 0353   Admin Instructions: 1 Packet = 17 grams. Mixed prescribed dose in 8 ounces of water. Follow with 8 oz. of water.               potassium chloride (KLOR-CON) Packet 20-40 mEq  Dose: 20-40 mEq Freq: EVERY 2 HOURS PRN Route: ORAL OR FEED  PRN Reason: potassium supplementation  Start: 04/10/17 9039   Admin Instructions: Use if unable to tolerate tablets.  If Serum K+ 3.0-3.3, dose = 60 mEq po total dose (40 mEq x1 followed in 2 hours by 20 mEq x1). Recheck K+ level 4 hours after dose and the next AM.  If Serum K+ 2.5-2.9, dose = 80 mEq po total dose (40 mEq Q2H x2). Recheck K+ level 4 hours after dose and the next AM.  If Serum K+ less than 2.5, See IV order.  Dissolve packet contents in 4-8 ounces of cold water or juice.               potassium chloride 10 mEq in 100 mL intermittent infusion  Dose: 10 mEq Freq: EVERY 1 HOUR PRN Route: IV  PRN Reason: potassium supplementation  Start: 04/10/17 8439   Admin Instructions: Infuse via PERIPHERAL LINE or CENTRAL LINE. Use for central line replacement if patient weight less than 65 kg, if patient is on TPN with high potassium content or if unit does not stock 20 mEq bags.   If Serum K+ 3.0-3.3,  dose = 10 mEq/hr x4 doses (40 mEq IV total dose). Recheck K+ level 2 hours after dose and the next AM.   If Serum K+ less than 3.0, dose = 10 mEq/hr x6 doses (60 mEq IV total dose). Recheck K+ level 2 hours after dose and the next AM.               potassium chloride 10 mEq in 100 mL intermittent infusion with 10 mg lidocaine  Dose: 10 mEq Freq: EVERY 1 HOUR PRN Route: IV  PRN Reason: potassium supplementation  Start: 04/10/17 1359   Admin Instructions: Infuse via PERIPHERAL LINE. Use potassium with lidocaine for pain with peripheral administration.  If Serum K+ 3.0-3.3, dose = 10 mEq/hr x4 doses (40 mEq IV total dose). Recheck K+ level 2 hours after dose and the next AM.  If Serum K+ less than 3.0, dose = 10 mEq/hr x6 doses (60 mEq IV total dose). Recheck K+ level 2 hours after dose and the next AM.               potassium chloride 20 mEq in 50 mL intermittent infusion  Dose: 20 mEq Freq: EVERY 1 HOUR PRN Route: IV  PRN Reason: potassium supplementation  Start: 04/10/17 1359   Admin Instructions: Infuse via CENTRAL LINE Only. May need EKG if less than 65 kg or on TPN - Max rate is 0.3 mEq/kg/hr for patients not on EKG monitoring.   If Serum K+ 3.0-3.3, dose = 20 mEq/hr x2 doses (40 mEq IV total dose). Recheck K+ level 2 hours after dose and the next AM.  If Serum K+ less than 3.0, dose = 20 mEq/hr x3 doses (60 mEq IV total dose). Recheck K+ level 2 hours after dose and the next AM.               potassium chloride SA (K-DUR/KLOR-CON M) CR tablet 20 mEq  Dose: 20 mEq Freq: DAILY Route: PO  Start: 04/09/17 1315   Admin Instructions: DO NOT CRUSH.         1714 (20 mEq)-Given        0905 (20 mEq)-Given        0843 (20 mEq)-Given           potassium chloride SA (K-DUR/KLOR-CON M) CR tablet 20-40 mEq  Dose: 20-40 mEq Freq: EVERY 2 HOURS PRN Route: PO  PRN Reason: potassium supplementation  Start: 04/10/17 1359   Admin Instructions: Use if able to take PO.   If Serum K+ 3.0-3.3, dose = 60 mEq po total dose (40 mEq x1  followed in 2 hours by 20 mEq x1). Recheck K+ level 4 hours after dose and the next AM.  If Serum K+ 2.5-2.9, dose = 80 mEq po total dose (40 mEq Q2H x2). Recheck K+ level 4 hours after dose and the next AM.  If Serum K+ less than 2.5, See IV order.  DO NOT CRUSH.               potassium phosphate 15 mmol in D5W 250 mL intermittent infusion  Dose: 15 mmol Freq: DAILY PRN Route: IV  PRN Reason: phosphorous supplementation  Start: 04/10/17 1400   Admin Instructions: For serum phosphorus level 2-2.4  Do not infuse Phosphorus in the same line as TPN.   Give 15 mmol and recheck phosphorus level next AM.          2032 (15 mmol)-New Bag            potassium phosphate 20 mmol in D5W 250 mL intermittent infusion  Dose: 20 mmol Freq: EVERY 6 HOURS PRN Route: IV  PRN Reason: phosphorous supplementation  Start: 04/10/17 1400   Admin Instructions: For serum phosphorus level 1.1-1.9  For CENTRAL Line ONLY  Do not infuse Phosphorus in the same line as TPN.   Give 20 mmol and recheck phosphorus level 2 hours after last dose and next AM.               potassium phosphate 20 mmol in D5W 500 mL intermittent infusion  Dose: 20 mmol Freq: EVERY 6 HOURS PRN Route: IV  PRN Reason: phosphorous supplementation  Start: 04/10/17 1400   Admin Instructions: For serum phosphorus level 1.1-1.9  For Peripheral Line  Do not infuse Phosphorus in the same line as TPN.   Give 20 mmol and recheck phosphorus level 2 hours after last dose and next AM.               potassium phosphate 25 mmol in D5W 500 mL intermittent infusion  Dose: 25 mmol Freq: EVERY 8 HOURS PRN Route: IV  PRN Reason: phosphorous supplementation  Start: 04/10/17 1400   Admin Instructions: For serum phosphorus level less than 1.1  Do not infuse Phosphorus in the same line as TPN.   Give 25 mmol and recheck phosphorus level 2 hours after last dose and next AM.               predniSONE (DELTASONE) tablet 30 mg  Dose: 30 mg Freq: DAILY Route: PO  Start: 04/09/17 1300        1445 (30  mg)-Given        0905 (30 mg)-Given        0841 (30 mg)-Given           sodium chloride (PF) 0.9% PF flush 3 mL  Dose: 3 mL Freq: EVERY 8 HOURS Route: IK  Start: 04/03/17 0800   Admin Instructions: And Q1H PRN, to lock peripheral IV dormant line.     0845 (3 mL)-Given               0041 (3 mL)-Given              (1607)-Not Given        0027 (3 mL)-Given       (0921)-Not Given       (1809)-Not Given        0009 (3 mL)-Given       0927 (3 mL)-Given       (1630)-Not Given       2138 (3 mL)-Given               0856 (3 mL)-Given       1711 (3 mL)-Given        0005 (3 mL)-Given       0912 (3 mL)-Given       2032 (3 mL)-Given        0052 (3 mL)-Given       0844 (3 mL)-Given       (1547)-Not Given           sodium chloride (PF) 0.9% PF flush 3 mL  Dose: 3 mL Freq: EVERY 1 HOUR PRN Route: IK  PRN Reason: line flush  PRN Comment: for peripheral IV flush post IV meds  Start: 04/03/17 0353              spironolactone (ALDACTONE) tablet 25 mg  Dose: 25 mg Freq: DAILY Route: PO  Start: 04/06/17 0800     (1001)-Not Given        0854 (25 mg)-Given        0925 (25 mg)-Given        0833 (25 mg)-Given        0903 (25 mg)-Given        0844 (25 mg)-Given           traZODone (DESYREL) half-tab 25-50 mg  Dose: 25-50 mg Freq: AT BEDTIME PRN Route: PO  PRN Reason: sleep  Start: 04/05/17 1744       0040 (50 mg)-Given              Warfarin Therapy Reminder (Check START DATE - warfarin may be starting in the FUTURE)  Freq: CONTINUOUS PRN Route: XX  Start: 04/03/17 0353   Admin Instructions: *Note to reorder warfarin daily*  Pharmacy Warfarin Dosing Service  Patient is on Warfarin Therapy - check for daily order              Future Medications  Medications 04/05/17 04/06/17 04/07/17 04/08/17 04/09/17 04/10/17 04/11/17       levofloxacin (LEVAQUIN) tablet 500 mg  Dose: 500 mg Freq: DAILY Route: PO  Indications of Use: COMMUNITY ACQUIRED PNEUMONIA  Start: 04/12/17 0800   End: 04/14/17 0759   Admin Instructions: Administer at least 2 hrs before or  4 hrs after aluminum, calcium, iron, zinc or magnesium containing products.               warfarin (COUMADIN) tablet 7.5 mg  Dose: 7.5 mg Freq: ONCE AT 6PM Route: PO  Start: 04/11/17 1800          [ ] 1800          Completed Medications  Medications 04/05/17 04/06/17 04/07/17 04/08/17 04/09/17 04/10/17 04/11/17         Dose: 5 Units Freq: ONCE Route: SC  Start: 04/09/17 0245   End: 04/09/17 0333   Admin Instructions: If given at mealtime, must be administered 5 min before meal or immediately after.         0333 (5 Units)-Given               Dose: 3 Units Freq: ONCE Route: SC  Start: 04/08/17 2345   End: 04/08/17 2350   Admin Instructions: Please give in addition to 7 units given at 2135.  Thank you  If given at mealtime, must be administered 5 min before meal or immediately after.        2350 (3 Units)-Given                Dose: 4 Units Freq: ONCE Route: SC  Start: 04/09/17 1000   End: 04/09/17 1025        1025 (4 Units)-Given               Dose: 3 mg Freq: ONCE AT 6PM Route: PO  Start: 04/08/17 1800   End: 04/08/17 1758       1758 (3 mg)-Given                Dose: 6 mg Freq: ONCE AT 6PM Route: PO  Start: 04/10/17 1800   End: 04/10/17 1815         1815 (6 mg)-Given           Discontinued Medications  Medications 04/05/17 04/06/17 04/07/17 04/08/17 04/09/17 04/10/17 04/11/17         Dose: 2 mL Freq: EVERY 6 HOURS Route: NEBULIZATION  Start: 04/09/17 0800   End: 04/09/17 2111   Admin Instructions: Nebulization tubing with a FILTER is recommended with acetylcysteine nebs.         0903 (2 mL)-Given       1245 (2 mL)-Given              2104 (2 mL)-Given       2111-Med Discontinued           Dose: 2 mL Freq: EVERY 6 HOURS Route: NEBULIZATION  Start: 04/09/17 0000   End: 04/09/17 0117   Admin Instructions: Nebulization tubing with a FILTER is recommended with acetylcysteine nebs.         0116 (2 mL)-Given       0117-Med Discontinued           Rate: 0-24 mL/hr Freq: CONTINUOUS Route: IV  Last Dose: Stopped (04/08/17  1000)  Start: 04/07/17 1715   End: 04/08/17 2330   Admin Instructions: Initiate drip with Algorithm #1 (see hyperlink to protocol). Start protocol only if glucose greater than 150 mg/dL. Maintain glucose level between 100-150 mg/dL.  Discontinue when glycemic control achieved and transitioning to SQ insulin, or Insulin therapy no longer required. When blood glucose has stabilized and patient is tolerating PO intake, call provider for transition to SQ insulin.  For Infusion Instructions, Click on ADULT DRIP PROTOCOL hyperlink below.       1912 (5 Units/hr)-New Bag       2005 (4.5 Units/hr)-Rate/Dose Change       2100 (3.5 Units/hr)-Rate/Dose Change       2200 (4 Units/hr)-Rate/Dose Change       2300 (9 Units/hr)-Rate/Dose Change [C]        0000 (6 Units/hr)-Rate/Dose Change       0007 (6 Units/hr)-New Bag       0100 (4 Units/hr)-Rate/Dose Change       0200 (3 Units/hr)-Rate/Dose Change       0300 (2 Units/hr)-Rate/Dose Change [C]       0400 (2 Units/hr)-Rate/Dose Verify       0500 (2 Units/hr)-Rate/Dose Verify       0600 (2 Units/hr)-Rate/Dose Verify       0700 (2 Units/hr)-Rate/Dose Verify       0800 (2 Units/hr)-Rate/Dose Verify       0900 (2 Units/hr)-Rate/Dose Verify       1000-Stopped       2330-Med Discontinued            Dose: 3 Units Freq: 3 TIMES DAILY WITH MEALS Route: SC  Start: 04/09/17 0800   End: 04/08/17 2342   Admin Instructions: Please give in addition to 7 units already given at 2135  If given at mealtime, must be administered 5 min before meal or immediately after.        2342-Med Discontinued            Dose: 1-7 Units Freq: AT BEDTIME Route: SC  Start: 04/08/17 2200   End: 04/11/17 0958   Admin Instructions: HIGH INSULIN RESISTANCE DOSING    Do Not give Bedtime Correction Insulin if BG less than 200.   For  - 224 give 1 units.   For  - 249 give 2 units.   For  - 274 give 3 units.   For  - 299 give 4 units.   For  - 324 give 5 units.   For  - 349 give 6 units.    For BG greater than or equal to 350 give 7 units.   Notify provider if glucose greater than or equal to 350 mg/dL after administration of correction dose.  If given at mealtime, must be administered 5 min before meal or immediately after.        2135 (7 Units)-Given [C]        2252 (2 Units)-Given        2148 (1 Units)-Given [C]        0958-Med Discontinued         Dose: 1-10 Units Freq: 3 TIMES DAILY BEFORE MEALS Route: SC  Start: 04/08/17 1730   End: 04/11/17 0958   Admin Instructions: Correction Scale - HIGH INSULIN RESISTANCE DOSING     Do Not give Correction Insulin if Pre-Meal BG less than 140.   For Pre-Meal  - 164 give 1 unit.   For Pre-Meal  - 189 give 2 units.   For Pre-Meal  - 214 give 3 units.   For Pre-Meal  - 239 give 4 units.   For Pre-Meal  - 264 give 5 units.   For Pre-Meal  - 289 give 6 units.   For Pre-Meal  - 314 give 7 units.   For Pre-Meal  - 339 give 8 units.   For Pre-Meal  - 364 give 9 units.   For Pre-Meal BG greater than or equal to 365 give 10 units  To be given with prandial insulin, and based on pre-meal blood glucose.   Notify provider if glucose greater than or equal to 350 mg/dL after administration of correction dose.  If given at mealtime, must be administered 5 min before meal or immediately after.        (1754)-Not Given [C]        0730 (2 Units)-Given       (1157)-Not Given       (1710)-Not Given        (0845)-Not Given [C]       1313 (2 Units)-Given [C]       1817 (5 Units)-Given [C]        (0835)-Not Given       0958-Med Discontinued         Freq: DAILY WITH SUPPER Route: SC  Start: 04/08/17 1700   End: 04/11/17 0958   Admin Instructions: DOSE:  1 units per 9 grams of carbohydrate. Only chart total amount of units given.  Do not give if pre-prandial glucose is less than 60 mg/dL.  If given at mealtime, must be administered 5 min before meal or immediately after.        1755 (10 Units)-Given        1803 (9 Units)-Given         1820 (9 Units)-Given [C]        0958-Med Discontinued         Freq: DAILY WITH LUNCH Route: SC  Start: 04/08/17 1200   End: 04/11/17 0958   Admin Instructions: DOSE:  1 units per 9 grams of carbohydrate. Only chart total amount of units given.  Do not give if pre-prandial glucose is less than 60 mg/dL.  If given at mealtime, must be administered 5 min before meal or immediately after.        1251 (6 Units)-Given        1219 (6 Units)-Given        1319 (8 Units)-Given [C]        0958-Med Discontinued         Freq: EVERY MORNING BEFORE BREAKFAST Route: SC  Start: 04/09/17 0730   End: 04/11/17 0958   Admin Instructions: DOSE:  1 units per 9 grams of carbohydrate.  Only chart total amount of units given.  Do not give if pre-prandial glucose is less than 60 mg/dL.  If given at mealtime, must be administered 5 min before meal or immediately after.         0829 (7 Units)-Given        0853 (13 Units)-Given [C]        0852 (11 Units)-Given       0958-Med Discontinued         Dose: 1-5 Units Freq: AT BEDTIME Route: SC  Start: 04/08/17 2200   End: 04/08/17 1715   Admin Instructions: MEDIUM INSULIN RESISTANCE DOSING    Do Not give Bedtime Correction Insulin if BG less than  200.   For  - 249 give 1 units.   For  - 299 give 2 units.   For  - 349 give 3 units.   For  -399 give 4 units.   For BG greater than or equal to 400 give 5 units.  Notify provider if glucose greater than or equal to 350 mg/dL after administration of correction dose.  If given at mealtime, must be administered 5 min before meal or immediately after.        1715-Med Discontinued            Dose: 1-7 Units Freq: 3 TIMES DAILY BEFORE MEALS Route: SC  Start: 04/08/17 1200   End: 04/08/17 1715   Admin Instructions: Correction Scale - MEDIUM INSULIN RESISTANCE DOSING     Do Not give Correction Insulin if Pre-Meal BG less than 140.   For Pre-Meal  - 189 give 1 unit.   For Pre-Meal  - 239 give 2 units.   For Pre-Meal  -  289 give 3 units.   For Pre-Meal  - 339 give 4 units.   For Pre-Meal - 399 give 5 units.   For Pre-Meal -449 give 6 units  For Pre-Meal BG greater than or equal to 450 give 7 units.   To be given with prandial insulin, and based on pre-meal blood glucose.    Notify provider if glucose greater than or equal to 350 mg/dL after administration of correction dose.  If given at mealtime, must be administered 5 min before meal or immediately after.        1223 (4 Units)-Given       1707 (5 Units)-Given       1715-Med Discontinued            Dose: 19 Units Freq: EVERY MORNING BEFORE BREAKFAST Route: SC  Start: 04/08/17 0945   End: 04/09/17 0959   Admin Instructions: Give now then stop drip after 2 hours        1146 (19 Units)-Given        0733 (19 Units)-Given       0959-Med Discontinued           Dose: 3 mL Freq: EVERY 4 HOURS SCHEDULED Route: NEBULIZATION  Start: 04/06/17 1600   End: 04/09/17 2111            2126 (3 mL)-Given        0032 (3 mL)-Given       0438 (3 mL)-Given       0844 (3 mL)-Given       1323 (3 mL)-Given       1708 (3 mL)-Given       2035 (3 mL)-Given        0031 (3 mL)-Given       0404 (3 mL)-Given       0942 (3 mL)-Given       1351 (3 mL)-Given       1732 (3 mL)-Given       1943 (3 mL)-Given        0115 (3 mL)-Given       0410 (3 mL)-Given       0902 (3 mL)-Given       1245 (3 mL)-Given       (1717)-Not Given       2104 (3 mL)-Given       2111-Med Discontinued           Dose: 750 mg Freq: EVERY 24 HOURS Route: PO  Indications of Use: HEALTHCARE-ASSOCIATED PNEUMONIA  Start: 04/08/17 0800   End: 04/11/17 1002   Admin Instructions: Administer at least 2 hrs before or 4 hrs after aluminum, calcium, iron, zinc or magnesium containing products.        1000 (750 mg)-Given        0833 (750 mg)-Given        0903 (750 mg)-Given        0843 (750 mg)-Given       1002-Med Discontinued         Dose: 75 mg Freq: 2 TIMES DAILY Route: PO  Indications of Use: INFLUENZA  Start: 04/05/17 0930   End:  04/09/17 2359    1032 (75 mg)-Given       1941 (75 mg)-Given        (1001)-Not Given       2049 (75 mg)-Given        0920 (75 mg)-Given       2006 (75 mg)-Given        0927 (75 mg)-Given       2004 (75 mg)-Given        0833 (75 mg)-Given       2054 (75 mg)-Given       2359-Med Discontinued           Dose: 20-40 mEq Freq: EVERY 2 HOURS PRN Route: ORAL OR FEED  PRN Reason: potassium supplementation  Start: 04/03/17 0626   End: 04/10/17 1408   Admin Instructions: Use if unable to tolerate tablets.  If Serum K+ 3.0-3.3, dose = 60 mEq po total dose (40 mEq x1 followed in 2 hours by 20 mEq x1). Recheck K+ level 4 hours after dose and the next AM.  If Serum K+ 2.5-2.9, dose = 80 mEq po total dose (40 mEq Q2H x2). Recheck K+ level 4 hours after dose and the next AM.  If Serum K+ less than 2.5, See IV order.  Dissolve packet contents in 4-8 ounces of cold water or juice.          1408-Med Discontinued          Dose: 10 mEq Freq: EVERY 1 HOUR PRN Route: IV  PRN Reason: potassium supplementation  Start: 04/03/17 0626   End: 04/10/17 1408   Admin Instructions: Infuse via PERIPHERAL LINE or CENTRAL LINE. Use for central line replacement if patient weight less than 65 kg, if patient is on TPN with high potassium content or if unit does not stock 20 mEq bags.   If Serum K+ 3.0-3.3, dose = 10 mEq/hr x4 doses (40 mEq IV total dose). Recheck K+ level 2 hours after dose and the next AM.   If Serum K+ less than 3.0, dose = 10 mEq/hr x6 doses (60 mEq IV total dose). Recheck K+ level 2 hours after dose and the next AM.          1408-Med Discontinued          Dose: 10 mEq Freq: EVERY 1 HOUR PRN Route: IV  PRN Reason: potassium supplementation  Start: 04/03/17 0626   End: 04/10/17 1408   Admin Instructions: Infuse via PERIPHERAL LINE. Use potassium with lidocaine for pain with peripheral administration.  If Serum K+ 3.0-3.3, dose = 10 mEq/hr x4 doses (40 mEq IV total dose). Recheck K+ level 2 hours after dose and the next AM.  If Serum K+  less than 3.0, dose = 10 mEq/hr x6 doses (60 mEq IV total dose). Recheck K+ level 2 hours after dose and the next AM.          1408-Med Discontinued          Dose: 20 mEq Freq: EVERY 1 HOUR PRN Route: IV  PRN Reason: potassium supplementation  Start: 04/03/17 0626   End: 04/10/17 1408   Admin Instructions: Infuse via CENTRAL LINE Only. May need EKG if less than 65 kg or on TPN - Max rate is 0.3 mEq/kg/hr for patients not on EKG monitoring.   If Serum K+ 3.0-3.3, dose = 20 mEq/hr x2 doses (40 mEq IV total dose). Recheck K+ level 2 hours after dose and the next AM.  If Serum K+ less than 3.0, dose = 20 mEq/hr x3 doses (60 mEq IV total dose). Recheck K+ level 2 hours after dose and the next AM.          1408-Med Discontinued          Dose: 20-40 mEq Freq: EVERY 2 HOURS PRN Route: PO  PRN Reason: potassium supplementation  Start: 04/03/17 0626   End: 04/10/17 1408   Admin Instructions: Use if able to take PO.   If Serum K+ 3.0-3.3, dose = 60 mEq po total dose (40 mEq x1 followed in 2 hours by 20 mEq x1). Recheck K+ level 4 hours after dose and the next AM.  If Serum K+ 2.5-2.9, dose = 80 mEq po total dose (40 mEq Q2H x2). Recheck K+ level 4 hours after dose and the next AM.  If Serum K+ less than 2.5, See IV order.  DO NOT CRUSH.          1408-Med Discontinued          Dose: 40 mg Freq: DAILY Route: PO  Start: 04/07/17 1300   End: 04/09/17 0610      1519 (40 mg)-Given        1146 (40 mg)-Given        0610-Med Discontinued           Dose: 1 each Freq: NO DOSE TODAY (WARFARIN) Route: XX  Start: 04/09/17 1025   End: 04/10/17 1024   Admin Instructions: No dose of Warfarin due today per order          1024-Med Discontinued     Medications 04/05/17 04/06/17 04/07/17 04/08/17 04/09/17 04/10/17 04/11/17               INTERAGENCY TRANSFER FORM - NOTES (H&P, Discharge Summary, Consults, Procedures, Therapies)   4/2/2017                       UNIT [address] [facility] BANK: [phone]               History & Physicals      H&P by  Sandra Jones MD at 4/3/2017 12:50 AM     Author:  Sandra Jones MD Service:  Family Medicine Author Type:  Resident    Filed:  4/3/2017  2:51 PM Date of Service:  4/3/2017 12:50 AM Note Created:  4/3/2017  1:30 AM    Status:  Attested :  Sandra Jones MD (Resident)    Cosigner:  Abhishek Elizabeth MD at 4/3/2017  4:51 PM        Attestation signed by Abhishek Elizabeth MD at 4/3/2017  4:51 PM        Attestation:  This patient has been seen and evaluated by meAbhishek on 4/3/2017.  I saw and discussed the case with the resident Dr Jones and the care team. I agree with the findings and plan in this note. I have reviewed today's vital signs, medications, laboratory results and imaging results.   Code for today's visit :75161 Inpatient admission High complexity/severity.  The patient's condition meets inpatient criteria because of the following diagnoses and severity indicators : Acute respiratory failure secondary to CAP.  Abhishek Elizabeth MD  [facility]                                     [YT1.1]                                                 Please see day team addendum at the bottom[KC1.1]    [facility]  Inpatient History and Physical    Betty Tee MRN# [id number]   Age: 76 year old YOB: 1940     4/3/2017 12:50 AM    Primary care provider: Ilene Tristan            Chief Concern:   Dizziness, and concern for AMS       History is obtained from the patient, and roommate in the room.          History of Present Illness (Resident / Clinician):   Betty Tee is a 76 year old female with a history of Sjogren's Syndrome, interstitial lung disease, DM type II, HFpEF, DVT on Coumadin, left ventricular hypertrophy, afib on Metoprolol, and HTN who presents to the ED after her niece call EMS due to concern for AMS, and dizziness.    Brianna is a [employer]. Her roommate[YT1.1] was[YT1.2] concern after she got home and found her in the  bathroom with all the lights of the house off which is not their usual routine. Her roommate noticed that she was not speaking normally, with delayed response in conversation. Denies slurred/garbled speech. Her roommate call her neighbor, and her niece who also noticed changes in her speech reaction, and decided to call an ambulance.   Also, pt mentioned that she had been feeling more tired, and sleepy[YT1.1] more[YT1.2] than usual lately, especially yesterday to the point that she couldn't participated at her commitments for the day.  Few days ago she was in contact with a friend who was coughing, and feeling body aches, and believes that she may gotten from her.  She reports significant persistent cough, productive of whitish sputum for the last few days which occasionally caused her to vomiting a couple of times. Today she expended most of her day in bed, was also feeling dizzy, and diffuse upper abdominal pain associated with cough. She didn't exactly remember why she was at the bathroom with the lights off.     Denies any loss of speech comprehension, weakness, numbness, dyarthria, vision changes, chest pain, or headache. Didn't check her temp at home, but had been experiencing chills, and sweating. Her temp in the ED was 102.6F.     EMS did a EKG that showed RVR, which triggered STEMI alert. Cardiology was consulted by ED physician, MI ruled out and Echo was ordered.  Stroke team also consulted, head CT/CTA without acute abnormalities. No concerns for cerebrovascular pathology.    In ED:  azithromycin (ZITHROMAX) 500 mg IV given   metoprolol (LOPRESSOR) injection 5 mg (5 mg Intravenous Given 4/2/17 2154)   0.9% sodium chloride BOLUS    acetaminophen (TYLENOL) tablet 1,000 mg (1,000 mg Oral Given 4/2/17 2250)   cefTRIAXone (ROCEPHIN) 2 g IV given     Old records were reviewed, and any additions to pertinent history are noted above.          Assessment and Plan:   Assessment:   Betty Tee is a 76 year old  who presents for evaluation of persistent cough, dizziness, and concerns for AMS now resolved; and found to have a possible resp infection on CXR.  Patient Active Problem List   Diagnosis     Temporomandibular joint disorder     Diverticulitis of colon     Disorder of bone and cartilage     Osteoarthritis     Alcohol abuse, in remission     Restless legs syndrome (RLS)     Major depressive disorder, recurrent episode, moderate     Essential hypertension with goal blood pressure less than 140/90     CARDIOVASCULAR SCREENING; LDL GOAL LESS THAN 130     Sciatica     Advanced directives, counseling/discussion     Colouterine fistula     Atrial fibrillation with controlled ventricular response (H)     Left ventricular hypertrophy     Health Care Home     Insomnia     Joint pains     Allergic reaction caused by a drug - likely plaquenil     Breast fibroadenoma     DVT (deep venous thrombosis) (H)     Fatty liver disease, nonalcoholic     Rib pain     Neck mass     Gastroesophageal reflux disease without esophagitis     Enlarged lymph node     Sjogren's syndrome (H)     ANGELICA (obstructive sleep apnea)     ILD (interstitial lung disease) (H)     Lower GI bleed     Acute and chronic respiratory failure with hypoxia (H)     Acute edema of lung (H)     (HFpEF) heart failure with preserved ejection fraction (H)     Type 2 diabetes mellitus without complication, without long-term current use of insulin (H)         Plan:   ## SIRS- meet criteria on admission (fever, tachycardia), now resolved.[YT1.1]  ## Acute hypoxic respiratory failure- At home-4 LPM with activity only, now on 2 LPM at rest.[YT1.2]  ## ILD- (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 Also had lung nodules[YT1.3]) Follows up w/ Dr. Walsh- Last seen on[YT1.1] 3/22/17- Stable.[YT1.3]  ## CAP- Recent increase of cough, malaise, and CXR showing mild streaky opacities in the right lung base of infection or atelectasis.  - Stared on Azithromycin, and  Ceftriaxone.  - Cont. O2, now at 2 LPM   - Prednisone increased to 30 mg/day (stress dose)[YT1.1]- (Home dose: 10 mg daily)[YT1.3]  - Cont home montelukast  - Cont home Flovent  - Duoneb   - Albuterol neb prn    ## Dizziness/AMS- resolved  Likely due to infection, vs A Fib w/ RVR. MI, and stroke ruled out in ED. Pt also has Hx of alcohol abuse, but had been in remission for 30 years. Unlikely medications.  - Will monitor    ## A Fib.  ## HFpEF  ## HTN   RVR w/ , ST elevation in aVR, ST depressions in V4-V6 on admission EKG (no significant changes from previous)[YT1.1], eliz troponin x2.[YT1.3]  Good response to B-blocker given in ED.  - On Tele[YT1.1]  - Pharmacy to dose warfarin (INR on admission 1.79)[YT1.3]  - Holding furosemide, and spironolactone due to soft BPs on admission.[YT1.1]   - Cont metoprolol 100 mg daily.[YT1.2]  - Will monitor    ## Diabetes Mellitus- uncontrolled  Hgb A1c 11.6 on 4/2/17    Home regimen:  - Metformin 1000 mg daily    Hosp regimen:  - holding metformin  - On[YT1.1] L[YT1.4]SI   - Will monitor BG, and adjust treatment as needed.    Daily cares -   F:[YT1.1]oral[YT1.3]  E:[YT1.1]K re[YT1.2]placement per protocol.[YT1.4]  N:[YT1.1]regular diet[YT1.3]  Lines:[YT1.1]PIV[YT1.3]  Activity:[YT1.1]-Up as tolerated[YT1.3]  CODE:[YT1.1]Full Code[YT1.3]  Prophylaxis:[YT1.1]On warfarin, and Lovenox (prophylactic)[YT1.2]  PCP communication:  - Ilene Tristan    Dispo: Expected discharge date[YT1.1] 2-3 days pending on clinical improvement.      Discussed with faculty but not examined by faculty.[YT1.3]           Past Medical History:     Past Medical History:   Diagnosis Date     Alcohol abuse, in remission      Allergic rhinitis, cause unspecified     allegra helps when she takes it     Antiplatelet or antithrombotic long-term use      Atrial fibrillation (H)     in hosp in 11/11 after surgery w/ fluid overload     Cardiomegaly     LVH on stress echo- cardiac w/u at [facility] ER- neg  CT scan for PE, neg stress echo in 8/06     Chest pain, unspecified      Disorder of bone and cartilage, unspecified     osteopenia (had been on prempro), improved on 6/06 dexa, stable dexa 11/10     Diverticulosis of colon (without mention of hemorrhage)     last episode yrs ago     Essential hypertension, benign      Gastro-oesophageal reflux disease      Insomnia, unspecified     weaned off clonazepam     Irregular heart beat      Lumbago 7/09    MRI with DJD, now seeing Dr. Cain for sciatic sx's     Major depressive disorder, recurrent episode, moderate (H)      Obstructive sleep apnea      Osteoarthrosis, unspecified whether generalized or localized, unspecified site      Sjogren's syndrome (H)      Sleep apnea      Tobacco use disorder     chantix in 9/07, started again in 6/08, working             Past Surgical History:     Past Surgical History:   Procedure Laterality Date     BACK SURGERY  1962     BIOPSY BREAST  9/27/02    Biopsy Left Breast     C APPENDECTOMY  1970's?     C NONSPECIFIC PROCEDURE  11/05    exploratory abd lap, adhesions, resolved RLQ pain, diverticulitis episodes     CARDIAC SURGERY       CHOLECYSTECTOMY  1990's?     COLECTOMY LEFT  11/7/2011    Procedure:COLECTOMY LEFT; Laparoscopic mobilization of splenic flexture, sigmoid colectomy, coloprotoscopy, loop illeostomy; Surgeon:CK CASTANEDA; Location:[facility] OR     HYSTERECTOMY TOTAL ABDOMINAL, BILATERAL SALPINGO-OOPHORECTOMY, COMBINED  11/7/2011    Procedure:COMBINED HYSTERECTOMY TOTAL ABDOMINAL, BILATERAL SALPINGO-OOPHORECTOMY; total abdominal hysterectomy, bilateral salpingo-oophorectomy; Surgeon:ALETA MANUEL; Location:[facility] OR     INSERT STENT URETER  11/7/2011    Procedure:INSERT STENT URETER; Placement of Bilateral Ureteral Stents ; Surgeon:PRANEETH BRYANT; Location:[facility] OR     SIGMOIDOSCOPY FLEXIBLE  11/3/2011    Procedure:SIGMOIDOSCOPY FLEXIBLE; Flexible Sigmoidoscopy; Surgeon:CK CASTANEDA; Location:[facility] OR     TAKEDOWN ILEOSTOMY   2/1/2012    Procedure:TAKEDOWN ILEOSTOMY; Takedown Loop Ileostomy ; Surgeon:CK CASTANEDA; Location: OR             Social History:[YT1.1]   I have reviewed this patient's social history[YT1.3]   Social History     Social History     Marital status: Single     Spouse name: N/A     Number of children: 0     Years of education: Ed Spec De     Occupational History     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     Social History Main Topics     Smoking status: Former Smoker     Packs/day: 0.50     Years: 10.00     Types: Cigarettes     Quit date: 8/1/2011     Smokeless tobacco: Never Used      Comment: 1/2 ppd     Alcohol use No      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     Drug use: No     Sexual activity: No     Other Topics Concern     Not on file     Social History Narrative    Social Documentation:        Balanced Diet: YES    Calcium intake: 1-2 per day    Caffeine: 4-5 cups per day    Exercise:  type of activity limited right now due to foot injury    Sunscreen: No    Seatbelts:  Yes    Self Breast Exam:  Yes    Self Testicular Exam: n/a    Physical/Emotional/Sexual Abuse: No    Do you feel safe in your environment? No-pt lives in [facility]        Cholesterol screen up to date: No-will check today    Eye Exam up to date: Yes    Dental Exam up to date: Yes    Pap smear up to date: Does Not Apply    Mammogram up to date: Yes-10/07    Dexa Scan up to date: Yes-2006    Colonoscopy up to date: Yes-2006    Immunizations up to date: Yes-td 2002    Glucose screen if over 40:  Yes    '09[YT1.5]                              Family History:[YT1.1]   I have reviewed this patient's family history[YT1.3]       Family History   Problem Relation Age of Onset     C.A.D. Mother 63     MI- first at age 63     C.A.D. Sister 52     Minor MI- age 50's     HEART DISEASE Mother      HEART DISEASE Sister      Hypertension Mother      Hypertension Sister      Hypertension Sister       Hypertension Brother      CEREBROVASCULAR DISEASE Mother      Cancer - colorectal Sister 48     Late 40's early 50's     Prostate Cancer Brother 74     Dx'd age 74     Alcohol/Drug Father      Alzheimer Disease Father      GASTROINTESTINAL DISEASE Sister      Diverticulitis     GASTROINTESTINAL DISEASE Brother      Diverticulitis     Lipids Sister      Lipids Sister      Parkinsonism Brother      DIABETES Sister      HEART DISEASE Sister      CHF     CANCER Sister      lung, smoker     Substance Abuse Sister      Substance Abuse Brother      Dementia Father      Asthma Sister      CANCER Sister      Substance Abuse Sister      Substance Abuse Brother      Asthma Sister      Hypertension Father      Breast Cancer Daughter      Prostate Cancer Brother      Hyperlipidemia Mother      Hyperlipidemia Father      Hyperlipidemia Brother[YT1.5]           Immunizations:[YT1.1]     Immunization History   Administered Date(s) Administered     Influenza (High Dose) 3 valent vaccine 10/11/2013, 10/14/2014, 12/31/2015, 11/11/2016     Influenza (IIV3) 11/24/2003, 10/08/2004, 10/28/2005, 11/01/2006, 09/01/2010, 10/17/2011, 09/21/2012     Pneumococcal (PCV 13) 10/30/2015     Pneumococcal 23 valent 11/03/2010     TD (ADULT, 7+) 01/15/1993, 11/05/2002     TDAP Vaccine (Adacel) 11/03/2010     Zoster vaccine, live 10/05/2009[YT1.6]            Allergies:   Augmentin; Codeine; and Phenobarbital          Medications:     No current facility-administered medications on file prior to encounter.   Current Outpatient Prescriptions on File Prior to Encounter:  metFORMIN (GLUCOPHAGE-XR) 500 MG 24 hr tablet Take 2 tablets (1,000 mg) by mouth daily (with dinner)   order for DME Oxygen: Patient requires supplemental Oxygen 4 LPM via nasal canula with activity. Please provide patient with POC to improve mobility, okay to titrate for conserving to keep stats above 90%. Please fax results to [phone].  Oxygen will be for a lifetime.   pantoprazole  (PROTONIX) 20 MG EC tablet Take 1-2 tablets (20-40 mg) by mouth daily   PARoxetine (PAXIL) 10 MG tablet TAKE 1 TABLET (10mg) BY MOUTH AT BEDTIME   traZODone (DESYREL) 50 MG tablet TAKE 1/2-1 TABLET BY MOUTH NIGHTLY AS NEEDED, CAN TITRATE DOWN TO 1/2TAB OR UP TO 2TABS AS NEEDED   ketoconazole (NIZORAL) 2 % cream Apply topically 2 times daily   predniSONE (DELTASONE) 10 MG tablet Take 1 tablet (10 mg) by mouth daily   furosemide (LASIX) 40 MG tablet Take 1 tablet (40 mg) by mouth 2 times daily   metoprolol (TOPROL-XL) 100 MG 24 hr tablet Take 1 tablet (100 mg) by mouth daily   spironolactone (ALDACTONE) 25 MG tablet Take 1 tablet (25 mg) by mouth daily   montelukast (SINGULAIR) 10 MG tablet Take 1 tablet (10 mg) by mouth At Bedtime   albuterol (PROAIR HFA, PROVENTIL HFA, VENTOLIN HFA) 108 (90 BASE) MCG/ACT inhaler Inhale 2 puffs into the lungs every 6 hours   order for DME Oxygen: Patient requires supplemental Oxygen 4 LPM via nasal canula with activity. Please provide patient with a home unit and with portability capability. Oxygen will be for a lifetime.   azithromycin (ZITHROMAX) 250 MG tablet Take 1 tablet (250 mg) by mouth daily   polyethylene glycol (MIRALAX/GLYCOLAX) packet Take 17 g by mouth daily as needed for constipation   warfarin (COUMADIN) 7.5 MG tablet Current dose is 7.5 mg daily.  Dose adjusted per INR result.   acyclovir (ZOVIRAX) 5 % ointment Apply topically 6 times daily (Patient taking differently: Apply topically 6 times daily As needed for outbreaks)   fluticasone (FLOVENT HFA) 220 MCG/ACT inhaler Inhale 2 puffs into the lungs 2 times daily   acyclovir (ZOVIRAX) 400 MG tablet Take 400 mg by mouth See Admin Instructions 5 times daily as needed for outbreaks   fluticasone (FLONASE) 50 MCG/ACT nasal spray Spray 1-2 sprays into both nostrils daily (Patient taking differently: Spray 1-2 sprays into both nostrils daily as needed for allergies )   COMPRESSION STOCKINGS Wear compression stockings in  affected leg (right leg) or both legs most of the time during the day and take them off at night.   acetaminophen (TYLENOL) 500 MG tablet Take 500 mg by mouth nightly as needed             Review of Systems:[YT1.1]   Constitutional: Positive for fatigue. Negative for fever.   Respiratory: Negative for shortness of breath. Positive for cough  Cardiovascular: Negative for chest pain.   Gastrointestinal: Positive for abdominal pain with cough. Negative for N/V/D   Neurological: Positive for dizziness at home. Negative for weakness, vision changes, or headache.   Psychiatric/Behavioral: Negative for confusion.   Skin: Negative for rash or lesions.[YT1.2]           Physical Exam:[YT1.1]   Vitals were reviewed[YT1.3]  Temp: 98.7  F (37.1  C) Temp src: Oral BP: 113/64 Pulse: 97 Heart Rate: 97 Resp: 18 SpO2: 97 % O2 Device: Nasal cannula Oxygen Delivery: 2 LPM[YT1.6]   General: Comfortable in bed, NAD  HEENT: normocephalic, atraumatic  Resp: No resp distress.[YT1.3] Diffuse wheezing bilaterally. No crackles.[YT1.4]  CV: Irregularly irregular[YT1.3]. No murmur.   Abdomen: Soft, normal BS, mildly TTP in upper abdomen, no guarding or rebound, no mass.  Back: No CVA tenderness. No deformity.  Neuro: Alert, oriented x3, clear speech. Cranial nerve grossly intact. No motor deficit.[YT1.2]   Extremities: peripheral pulses palpable[YT1.3]. No edema.   MSK: Normal ROM. TTP bilaterally.[YT1.2]  Skin: intact[YT1.3]   Psych: Normal mood and affect.[YT1.2]         Data:     Results for orders placed or performed during the hospital encounter of 04/02/17 (from the past 24 hour(s))   CBC with platelets differential   Result Value Ref Range    WBC 6.4 4.0 - 11.0 10e9/L    RBC Count 4.66 3.8 - 5.2 10e12/L    Hemoglobin 9.9 (L) 11.7 - 15.7 g/dL    Hematocrit 34.6 (L) 35.0 - 47.0 %    MCV 74 (L) 78 - 100 fl    MCH 21.2 (L) 26.5 - 33.0 pg    MCHC 28.6 (L) 31.5 - 36.5 g/dL    RDW 18.6 (H) 10.0 - 15.0 %    Platelet Count 218 150 - 450 10e9/L     Diff Method Automated Method     % Neutrophils 71.3 %    % Lymphocytes 14.4 %    % Monocytes 11.9 %    % Eosinophils 1.4 %    % Basophils 0.5 %    % Immature Granulocytes 0.5 %    Nucleated RBCs 0 0 /100    Absolute Neutrophil 4.5 1.6 - 8.3 10e9/L    Absolute Lymphocytes 0.9 0.8 - 5.3 10e9/L    Absolute Monocytes 0.8 0.0 - 1.3 10e9/L    Absolute Eosinophils 0.1 0.0 - 0.7 10e9/L    Absolute Basophils 0.0 0.0 - 0.2 10e9/L    Abs Immature Granulocytes 0.0 0 - 0.4 10e9/L    Absolute Nucleated RBC 0.0    INR   Result Value Ref Range    INR 1.79 (H) 0.86 - 1.14   Comprehensive metabolic panel   Result Value Ref Range    Sodium 143 133 - 144 mmol/L    Potassium 3.3 (L) 3.4 - 5.3 mmol/L    Chloride 108 94 - 109 mmol/L    Carbon Dioxide 23 20 - 32 mmol/L    Anion Gap 12 3 - 14 mmol/L    Glucose 213 (H) 70 - 99 mg/dL    Urea Nitrogen 7 7 - 30 mg/dL    Creatinine 0.58 0.52 - 1.04 mg/dL    GFR Estimate >90  Non  GFR Calc   >60 mL/min/1.7m2    GFR Estimate If Black >90   GFR Calc   >60 mL/min/1.7m2    Calcium 8.2 (L) 8.5 - 10.1 mg/dL    Bilirubin Total 0.6 0.2 - 1.3 mg/dL    Albumin 3.0 (L) 3.4 - 5.0 g/dL    Protein Total 7.0 6.8 - 8.8 g/dL    Alkaline Phosphatase 72 40 - 150 U/L    ALT 19 0 - 50 U/L    AST 29 0 - 45 U/L   Lipase   Result Value Ref Range    Lipase 136 73 - 393 U/L   Lactic acid whole blood   Result Value Ref Range    Lactic Acid 1.6 0.7 - 2.1 mmol/L   Troponin I   Result Value Ref Range    Troponin I ES 0.020 0.000 - 0.045 ug/L   Blood culture   Result Value Ref Range    Specimen Description Blood Left Hand     Culture Micro No growth after 2 hours     Micro Report Status Pending    Troponin POCT   Result Value Ref Range    Troponin I 0.00 0.00 - 0.10 ug/L   Glucose by meter   Result Value Ref Range    Glucose 228 (H) 70 - 99 mg/dL   ISTAT gases elec ica gluc thad POCT   Result Value Ref Range    Ph Venous 7.46 (H) 7.32 - 7.43 pH    PCO2 Venous 35 (L) 40 - 50 mm Hg    PO2 Venous 33  25 - 47 mm Hg    Bicarbonate Venous 24 21 - 28 mmol/L    O2 Sat Venous 68 %    Sodium 141 133 - 144 mmol/L    Potassium 3.4 3.4 - 5.3 mmol/L    Glucose 234 (H) 70 - 99 mg/dL    Calcium Ionized 4.6 4.4 - 5.2 mg/dL    Hemoglobin 11.6 (L) 11.7 - 15.7 g/dL    Hematocrit - POCT 34 (L) 35.0 - 47.0 %PCV   Chest  XR, 1 view portable    Narrative    EXAM: XR CHEST PORT 1 VW  4/2/2017 9:59 PM      HISTORY: stemi    COMPARISON: CT 3/22/2017, chest x-ray 8/1/2016    FINDINGS: Cardiac silhouette is enlarged, stable. Mild streaky medial  right basilar opacities. No pleural effusion or pneumothorax.       Impression    IMPRESSION: Mild streaky opacities in the right lung base of infection  or atelectasis.    I have personally reviewed the examination and initial interpretation  and I agree with the findings.    HOMAR SHIELDS MD   ISTAT gases lactate thad POCT   Result Value Ref Range    Ph Venous 7.46 (H) 7.32 - 7.43 pH    PCO2 Venous 36 (L) 40 - 50 mm Hg    PO2 Venous 35 25 - 47 mm Hg    Bicarbonate Venous 26 21 - 28 mmol/L    O2 Sat Venous 70 %    Lactic Acid 1.5 0.7 - 2.1 mmol/L   INR point of care   Result Value Ref Range    INR Point of Care 1.3 (H) 0.86 - 1.14   Blood culture   Result Value Ref Range    Specimen Description Blood Right Arm     Culture Micro No growth after 2 hours     Micro Report Status Pending    Creatinine POCT   Result Value Ref Range    Creatinine 0.6 0.52 - 1.04 mg/dL    GFR Estimate >90 >60 mL/min/1.7m2    GFR Estimate If Black >90 >60 mL/min/1.7m2   CT Head Neck Angio w/o & w Contrast    Narrative    1. Head CTA demonstrates no aneurysm or stenosis of the major  intracranial arteries.   2. Neck CTA demonstrates no stenosis of the major cervical arteries.   3. No evidence of intracranial hemorrhage on the noncontrast head CT.   4. Right suprahyoid neck lymphadenopathy not significantly changed  compared to 6/12/2015.     Influenza A/B antigen swab   Result Value Ref Range    Influenza A/B Agn  Specimen Nasopharyngeal     Influenza A Negative NEG    Influenza B  NEG     Negative   Test results must be correlated with clinical data. If necessary, results   should be confirmed by a molecular assay or viral culture.     UA reflex to Microscopic and Culture   Result Value Ref Range    Color Urine Light Yellow     Appearance Urine Clear     Glucose Urine >1000 (A) NEG mg/dL    Bilirubin Urine Negative NEG    Ketones Urine 40 (A) NEG mg/dL    Specific Gravity Urine 1.036 (H) 1.003 - 1.035    Blood Urine Negative NEG    pH Urine 6.5 5.0 - 7.0 pH    Protein Albumin Urine 30 (A) NEG mg/dL    Urobilinogen mg/dL Normal 0.0 - 2.0 mg/dL    Nitrite Urine Negative NEG    Leukocyte Esterase Urine Negative NEG    Source Clean catch urine     RBC Urine 2 0 - 2 /HPF    WBC Urine 1 0 - 2 /HPF    Squamous Epithelial /HPF Urine <1 0 - 1 /HPF    Mucous Urine Present (A) NEG /LPF   Urine Culture   Result Value Ref Range    Specimen Description Midstream Urine     Special Requests Specimen received in preservative     Culture Micro Pending     Micro Report Status Pending      *Note: Due to a large number of results and/or encounters for the requested time period, some results have not been displayed. A complete set of results can be found in Results Review.[YT1.1]      All cardiac studies reviewed    All imaging studies reviewed[YT1.2]      Admitting Physician:Mikey Elizabeth MD[YT1.1]    Day team addendum[KC1.2]  April 3, 2017[KC1.3]    Assessment and plan:[KC1.2]  Betty Tee is a 76 year old female with a history of Sjogren's Syndrome, interstitial lung disease, DM type II, HFpEF, DVT on Coumadin, left ventricular hypertrophy, afib on Metoprolol, and HTN who presents to the ED after her niece call EMS due to concern for AMS, and dizziness.[YT1.1] Her AMS resolved and she was found to have Acute hypoxic respiratory failure secondary to CAP.[KC1.2]     ## Acute hypoxic respiratory failure- At home-4 LPM with activity  only, now on 2 LPM at rest.[YT1.2]  ## ILD- (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 Also had lung nodules[YT1.3]) Follows up w/ Dr. Wlash- Last seen on[YT1.1] 3/22/17- Stable.[YT1.3]  ## CAP[YT1.1]  Patient febrile, tachycardic, tachypneic and with increased supplemental oxygen demands. Even though negative Procalcitonin and no leukocytosis, patient acutely ill and toxic appearing. R[KC1.2]ecent increase of cough, malaise, and CXR showing mild streaky opacities in the right lung base of infection or atelectasis[YT1.1]. Plan to start empiric treatment for CAP with ceftriaxone and azithromycin. Wells criteria for PE puts the patient at low risk for PE (1.5 points). Will proceed with a d-dimer.[KC1.2]     - Stared on Azithromycin, and Ceftriaxone[YT1.1] (started 04/02)[KC1.2]  - Cont. O2, now at 2 LPM   - Prednisone increased to 30 mg/day (stress dose)[YT1.1]- (Home dose: 10 mg daily)[YT1.3]  - Cont home montelukast  - Cont home Flovent  - Duoneb   - Albuterol neb prn[YT1.1]  - Incentive spirometry  - Consider CT with contrast to rule out PE if d-dimer positive.[KC1.2]    ## Diabetes Mellitus- uncontrolled  Hgb A1c 11.6 on 4/2/17    Home regimen:  - Metformin 1000 mg daily    Hosp regimen:  - holding metfo[YT1.1]rmin  - Basal: Lantus 10 units QAM  - Correction: LSSI    Patient discussed and examined by faculty.    Sandra Jones MD MPH  PGY2- [facility], Family Medicine  Pager- [phone][KC1.2]                 Revision History        User Key Date/Time User Provider Type Action    > KC1.3 4/3/2017  2:51 PM Sandra Jones MD Resident Sign     KC1.2 4/3/2017  2:28 PM Sandra Jones MD Resident      KC1.1 4/3/2017  2:25 PM Sandra Jones MD Resident Incomplete Revision     YT1.4 4/3/2017  7:31 AM Mikey Elizabeth MD Resident Sign     YT1.2 4/3/2017  6:49 AM Mikey Elizabeth MD Resident Sign     YT1.6 4/3/2017  5:00 AM Mikey Elizabeth MD Resident      YT1.5 4/3/2017  4:59 AM Glenn  MD Mikey Resident      YT1.3 4/3/2017  4:48 AM Mikey Elizabeth MD Resident      YT1.1 4/3/2017  1:30 AM Mikey Elizabeth MD Resident                   Discharge Summaries     No notes of this type exist for this encounter.         Consult Notes      Consults by Meghan Sotne RN at 4/10/2017  2:50 PM     Author:  Meghan Stone RN Service:  Endocrinology Author Type:  Nurse    Filed:  4/10/2017  2:50 PM Date of Service:  4/10/2017  2:50 PM Note Created:  4/10/2017  2:25 PM    Status:  Signed :  Meghan Stone RN (Nurse)     Consult Orders:    1. Diabetes Educator IP Consult [[id number]] ordered by Beatriz Painting MD at 04/09/17 0958                Diabetes Education    Received consult request.to see this 76 year old female for diabetes education.  Patient was seen by diabetes education on 4/5/17, but was too ill at that time and was subsequently transferred to ICU due to respiratory distress.    Patient now transferred to .  Patient on a 2 gram sodium diet, up in chair, visiting with friend.  Patient receptive to starting diabetes education today.  Her RN has been working with her today on the mechanics of the insulin pen.      Met with patient and friend/roommate Yvette.  Discussed insulin plan.  Patient quite overwhelmed with details of determining NovoLog dose.  Discussed option of a regimen with set meal doses of NovoLog rather than carbohydrate counting and correction scale and she is receptive to this option.    Patient requested that we do just a small amount of education today, then continue tomorrow.  Today we focused on insulin administration.  She practiced, needed some cues.  She would like to do her dinnertime injection of insulin, with her nurse supervising.  Left home pen needles at bedside.    Will meet with patient and Yvette tomorrow at 10 am to continue education.  Meghan Stone MS RN CDE CD[facility]  [phone][AM1.1]       Revision History        User Key Date/Time User Provider Type Action    >  AM1.1 4/10/2017  2:50 PM Meghan Stone, RN Nurse Sign            Consults by Juana Batista MD at 4/6/2017  1:38 PM     Author:  Juana Batista MD Service:  Cardiology Author Type:  Resident    Filed:  4/6/2017  4:54 PM Date of Service:  4/6/2017  1:38 PM Note Created:  4/6/2017  1:38 PM    Status:  Attested :  Juana Batista MD (Resident)    Cosigner:  TERA Miles MD at 4/7/2017  6:57 AM         Consult Orders:    1. Cardiology General Adult IP Consult: Patient to be seen: Routine within 24 hrs; Call back #: Pager: [phone] (night)/ [phone] (day); 77 yo F with acute hypoxic respiratory failure.  TTE performed today shows new septal wall akinesis.; Consultant may ente... [[id number]] ordered by Beatriz Painting MD at 04/06/17 1310           Attestation signed by TERA Miles MD at 4/7/2017  6:57 AM        Attestation:  Patient was seen and evaluated with the team.Agree with consult note.                                      Cardiology Inpatient Consultation  April 6, 2017    Reason for Consult:  A cardiology consult was requested by Dr. Beatriz Painting from the [facility] Medicine service to provide clinical guidance regarding new abnormal Echo findings and elevated Trop.    HPI:   Betty Tee is a 76 year old female with a past medication history of[EP1.1] Sjogren's associated ILD and bronchiolitis obliterans,[EP1.2] a.fib with controlled ventricular respone, LVH, ILD, HFpEF, DM type 2, ANGELICA, DVT who was admitted for AMS[EP1.1], dizziness and acute hypoxia respiratory failure,[EP1.2] influenza positive[EP1.1] with concern for CAP. On admission ACS and stroke work up negative. She was started on antibiotics, tamiflu and her daily home prednisone was increased. This morning patient had a.fib with RVR with rate in 160's, post-tussive emesis and episode of severe respiratory failure requiring 100% bipap. Rapid response was called, lactic acid 3.0 and she was then  "transferred to  for higher level of care. Troponin was slightly elevated, EKG was normal. CXR showed findings consistent with possible pulmonary edema/fluid overload. Echo was completed and showed new basal to mid anteroseptal akinesis and primary team consulted Cards for further evaluation.     Patient is followed by Dr. Aubrey Hurtado, last office visit 1/26/2016; Dr. Radha Rosa, last office visit 1/12/2017, Dr. Meño Walsh, last office visit 3/2/2017.[EP1.2]      At the time of interview, the patient denies chest pain[EP1.1],[EP1.2] PND, palpitations, lightheadedness, or syncope[EP1.1]. Reports diffuse muscle aches, unable to get into a comfortable position,[EP1.2] dyspnea at rest or with exertion, orthopnea[EP1.1] and fatigue. She is unable to localize any chest pain asked \"why is everyone worried about my heart\"?[EP1.2]     Review of Systems:    Complete review of systems was performed and negative except per HPI.    PMH:  Past Medical History:   Diagnosis Date     Alcohol abuse, in remission      Allergic rhinitis, cause unspecified     allegra helps when she takes it     Antiplatelet or antithrombotic long-term use      Atrial fibrillation (H)     in hosp in 11/11 after surgery w/ fluid overload     Cardiomegaly     LVH on stress echo- cardiac w/u at [facility] ER- neg CT scan for PE, neg stress echo in 8/06     Chest pain, unspecified      Disorder of bone and cartilage, unspecified     osteopenia (had been on prempro), improved on 6/06 dexa, stable dexa 11/10     Diverticulosis of colon (without mention of hemorrhage)     last episode yrs ago     Essential hypertension, benign      Gastro-oesophageal reflux disease      Insomnia, unspecified     weaned off clonazepam     Irregular heart beat      Lumbago 7/09    MRI with NEAL, now seeing Dr. 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 Alvarado.  She was first diagnosed with A.fib in 10/2011 during an inpatient stay for diverticulitis which ultimately required partial colectomy, Her A.fib at that time presented w/ RVR and pulmonary edema. She was treated with IV metoprolol and diuretics, followied by diltiazem and ultimately discharged on warfarin. Aside from pulmonary edema, A.fib was not symptomatic. TTE 10/2011 showed LVEF=55-60%, diastolic dysfunction/high filling pressures with no other significant structural nidus for A.fib. She had paroxysmal A.fib subsequently after discharge from that admission. LifeWatch event monitor 1/21/13-2/3/13 showed 1 episode of palpitations, correlated with 1 episode of A.fib lasting 4 hrs with VR up to 132. More recently, her A.fib has been present on all ECGs and has been felt to be persistent/permanent. She has been managed with a rate control strategy and anticoagulation with warfarin. Her prior therapies have included diltiazem CD (stopped earlier this year.)       Diverticulitis of colon 09/24/2004     [facility]: High     Multiple complications, surgeries, and hospitalizations from ~9/11-12/11 (see 12/09/11 note for overview).  A.fib started during with fluid overload during one of these hospitalizations.  Colostomy takedown in 2/12.  F/u colonoscopy in 12/12 (Dr. Canseco) looked good- rec next f/u colonoscopy in 10 yrs.  Problem list name updated by automated process. Provider to review       CAP (community acquired pneumonia) 04/03/2017     [facility]: Medium     Type 2 diabetes mellitus without  complication, without long-term current use of insulin (H) 10/24/2016     [facility]: Medium     (HFpEF) heart failure with preserved ejection fraction (H) 08/27/2016     [facility]: Medium     7/16 Cardiology notes- She was admitted again 7/2-7/4/16  with hypoxemia, which was felt to be related to HFpEF. She was noted in this setting to be in asymptomatic A.fib, XS=[id number]. Toprol XL was increased to 50 mg daily. As she had shown no evidence of recurrent GI bleeding, warfarin was resumed on discharge. She was scheduled for EP follow up to address A.fib. Of note, she was also diagnosed with ANGELICA and started on CPAP at this time which she has been using regularly.  Assessment- Sister Sita continues to experience NYHA class II-III exertional dyspnea and appears modestly hypervolemic on exam today. We have instructed her to increase her furosemide to 40 mg qAM/20mg qPM for the next 2 days, and arranged for her to establish care with [facility] within 1 week with BMP at that time.       Acute edema of lung (H) 07/04/2016     [facility]: Medium     Acute and chronic respiratory failure with hypoxia (H) 07/02/2016     [facility]: Medium     Lower GI bleed 06/26/2016     [facility]: Medium     Hospitalized 6/26-28/16 with [facility].  Colonoscopy with Dr. Siddiqi intended for after hospital stay, but pt had multiple complications including readmission on 7/2/16 for respiratory failure due to fluid overload and uncontrolled a.fib.  Hgb dropped from 13's to 9's, then to low of 8.5 during second hospitalization.  Colonoscopy f/u not done due to medical complications.  Hgb's slowly improving though not back to baseline (10's).  12/16 consult with Dr. Siddiqi- ludwin of pt's ability to safely tolerate a colonoscopy or colon surgery.  **Rec considering cologuard test to stratify her risk- will discuss with pt at upcoming visit/s.       ILD (interstitial lung disease) (H) 06/05/2016     [facility]: Medium     Sjorgren's associated ILD, possibly  IgG4 related lung disease.  Followed by [facility] and rheumatology.  Worsening PFT's in 10/15 despite increase in prednisone from 5mg/d to 10mg/d.    ILD conference- thought Follicular bronchiolitis -started on flovent, azithromycin and montelukast in 4/16.       ANGELICA (obstructive sleep apnea) 04/12/2016     [facility]: Medium     Sleep study in '16, mild sleep apnea, but significant sleep hypoventilation.  Started on CPAP, not helpful, so put on bilevel PAP through Dr. Chandler.       Sjogren's syndrome (H) 10/30/2015     [facility]: Medium     Dx in '15 by rheum- likely primary Sjogrens syndrome- 'with borderline positive lip bx, low titer RG, low titer SSA ab, sicca symptoms, interstitial lung disease w/ reticular opacities in lungs- paratracheal lymphadenopathy, elevated CRP'.    Rheum rec txt per pulmonary, rec f/u with rheum in 4/16.   Sx's likely since '12 with migrating joint pains, seen by rheum in '12 (Dr. Ray), dx with inflammatory jt disease, on 0-5mg of prednisone since then with minimal f/u.  Worsening SOB since early '15, lung lesions, PFT's with 50% decreased function- referred to pulmonary.  Has been on more stable prednisone 5mg/d for few months prior to 10/15 f/u PFT's stable, which remained stable.  Will cont f/u through pulmonary and rheum.           Enlarged lymph node 08/04/2015     [facility]: Medium     Gastroesophageal reflux disease without esophagitis 07/23/2015     [facility]: Medium     Neck mass 04/28/2015     [facility]: Medium     4/7/15 CT scan- prominent lymph nodes- rec f/u clinically, or f/u CT scan in 3-6 months.  Also noted apical lung changes- air trapping vs interstitial pulmonary edema (referred to pulmonary- dx with Sjogren's after seen by rheumatology).  6/15 CT scan- stable neck node, indeterminant- no f/u rec.  Possible related to the Sjogren's.    8/15- FNA of neck mass by ENT, Dr. Polk, no signs of malignancy/lymphoma.  F/u with 9/15 with Dr. Polk - focus on lip/cheek  bx's.  10/15- pt notes mass is still there, but stable.         Rib pain 10/31/2014     [facility]: Medium     S/p horrible fall down stairs in 8/14- hospitalized for a few days, then in rehab for a couple weeks.  Significant swelling, no fx's.  INR dropped for a bit between hosp/rehab, and pt developed DVT in 9/14 in R leg.       Fatty liver disease, nonalcoholic 09/24/2014     [facility]: Medium     DVT (deep venous thrombosis) (H) 09/15/2014     [facility]: Medium     Dx on 9/11/14 via u/s- worsening sx's over prior few days.  10/14 hematology consult- felt that the DVT was not a coumadin failure- attributed to immobility s/p fall, lower INR during hospitalization/rehab stay.    Rec continued long-term coumadin therapy (due to a.fib), f/u LE u/s in 3-6- months (nl f/u u/s in 4/15), and compression stockings x 2 yrs (pt compliant).       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       Allergic reaction caused by a drug - likely plaquenil 04/30/2013     [facility]: Medium     Pt seen at [facility] 5/13/13- plan to do prednisone 7.5mg daily until able to wean down (Dr. Kevin Marie).  Blood tests q3 months.       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  If she goes down too fast- gets shoulder, hip and generalized joint pains.  Seeing Dr. Ray [facility].  4/15- prednisone at 2.5mg/d  Referred to [facility]- dx with Sjogrens, and sx's stabilized on steady dose of prednisone 5mg/d (10/15).       Insomnia 05/26/2012     [facility]: Medium     Left ventricular hypertrophy 11/04/2011     [facility]: Medium     LVH on stress echo- cardiac w/u at [facility] ER- neg CT scan for PE, neg stress echo in 8/06        Sciatica 11/03/2010     [facility]: Medium     Was seeing Dr. Conklin (PMNR) - was on neurontin and zanaflex at night- helping her sleep.  Last MRI in  ~6/09.  Improved after 11/11 surgery- stopped meds.       Essential hypertension with goal blood pressure less than 140/90 10/19/2010     [facility]: Medium     Lisinopril and diltiazem (through cards), HCTZ.       Major depressive disorder, recurrent episode, moderate      [facility]: Medium     Paxil 10mg/d for years- increased to 20mg/d in 8/14.  Trial off in 10/15 (concerned with wt gain).  Tried off paxil in 5/09, restarted in 7/09.  Weaned off clonazepam.           Restless legs syndrome (RLS) 09/06/2007     [facility]: Medium     Health Care Home 02/09/2012     [facility]: Low              Advanced directives, counseling/discussion 11/01/2011     [facility]: Low     Received outside advance directive.  HCD:Previously signed by patient and notarized by .  scanned into EMR as Advance Directive/Living Will document. View document and details in Code Status History Report. Please see advance directive for specifics.   Health care directive (FIVE WISHES) reviewed and documented.  5/10/12 MALIKA Aguilar LPN        Received outside DNR form.    scanned and placed behind media tab.  Please see DNR form for specifics. Routed to certified facilitator for review and further documentation.  DNR form reviewed and documented.  11/1/11 MALIKA Aguilar LPN  In media tab under date 10/14/11.  Lev Tristan MD           CARDIOVASCULAR SCREENING; LDL GOAL LESS THAN 130 10/31/2010     [facility]: Low     Disorder of bone and cartilage 09/24/2004     [facility]: Low     Osteopenia in '10, recheck '15.  Risk factors of PPI use and prednisone use since '12 (0-5mg/d).  Should check Vit D levels as well.       Osteoarthritis 09/24/2004     [facility]: Low     Problem list name updated by automated process. Provider to review       Alcohol abuse, in remission 09/24/2004     [facility]: Low     Temporomandibular joint disorder 11/24/2003     [facility]: Low     Problem list name updated by automated process. Provider to  review       Social History:  Social History   Substance Use Topics     Smoking status: Former Smoker     Packs/day: 0.50     Years: 10.00     Types: Cigarettes     Quit date: 8/1/2011     Smokeless tobacco: Never Used      Comment: 1/2 ppd     Alcohol use No      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     Family History:  Family History   Problem Relation Age of Onset     C.A.D. Mother 63     MI- first at age 63     C.A.D. Sister 52     Minor MI- age 50's     HEART DISEASE Mother      HEART DISEASE Sister      Hypertension Mother      Hypertension Sister      Hypertension Sister      Hypertension Brother      CEREBROVASCULAR DISEASE Mother      Cancer - colorectal Sister 48     Late 40's early 50's     Prostate Cancer Brother 74     Dx'd age 74     Alcohol/Drug Father      Alzheimer Disease Father      GASTROINTESTINAL DISEASE Sister      Diverticulitis     GASTROINTESTINAL DISEASE Brother      Diverticulitis     Lipids Sister      Lipids Sister      Parkinsonism Brother      DIABETES Sister      HEART DISEASE Sister      CHF     CANCER Sister      lung, smoker     Substance Abuse Sister      Substance Abuse Brother      Dementia Father      Asthma Sister      CANCER Sister      Substance Abuse Sister      Substance Abuse Brother      Asthma Sister      Hypertension Father      Breast Cancer Daughter      Prostate Cancer Brother      Hyperlipidemia Mother      Hyperlipidemia Father      Hyperlipidemia Brother        Medications:    piperacillin-tazobactam  3.375 g Intravenous Q6H     vancomycin (VANCOCIN) IV  2,000 mg Intravenous Q12H     pantoprazole  40 mg Intravenous QAM AC     ipratropium - albuterol 0.5 mg/2.5 mg/3 mL  3 mL Nebulization Q2H     insulin aspart  1-6 Units Subcutaneous Q4H     [START ON 4/7/2017] insulin glargine  7 Units Subcutaneous QAM AC     methylPREDNISolone sodium succinate  125 mg Intravenous Q6H     warfarin  7.5 mg Oral ONCE at 18:00     oseltamivir  75 mg Oral BID     insulin aspart    Subcutaneous QAM AC     insulin aspart   Subcutaneous Daily with lunch     insulin aspart   Subcutaneous Daily with supper     furosemide  10 mg Oral Daily     spironolactone  25 mg Oral Daily     acetaminophen  1,000 mg Oral Q8H    Or     acetaminophen  650 mg Rectal Q8H     lactobacillus rhamnosus (GG)  1 capsule Oral TID AC     fluticasone furoate  1 puff Inhalation Daily     ketoconazole   Topical BID     montelukast  10 mg Oral At Bedtime     PARoxetine  10 mg Oral At Bedtime     sodium chloride (PF)  3 mL Intracatheter Q8H     metoprolol  100 mg Oral Daily     enoxaparin  40 mg Subcutaneous Q24H     insulin aspart  1-5 Units Subcutaneous At Bedtime         Warfarin Therapy Reminder       - MEDICATION INSTRUCTIONS -         Physical Exam:  Temp:  [96.6  F (35.9  C)-99.3  F (37.4  C)] 99.2  F (37.3  C)  Pulse:  [] 87  Heart Rate:  [] 105  Resp:  [16-36] 22  BP: (126-162)/() 126/94  FiO2 (%):  [40 %] 40 %  SpO2:  [97 %-100 %] 97 %    Intake/Output Summary (Last 24 hours) at 04/06/17 1338  Last data filed at 04/06/17 1045   Gross per 24 hour   Intake              485 ml   Output             2175 ml   Net            -1690 ml     GEN:[EP1.1] arousable, falls to sleep and is easily arousable, fidgety, unable to get still in bed.[EP1.2]   HEENT: no icterus  CV: RRR, normal s1/s2, no murmurs/rubs/s3/s4, no heave[EP1.1].[EP1.2]  CHEST:[EP1.1] wheezing heard throughout lung bases, coarse rhonchi diffusely in right lung.[EP1.2]   ABD: soft, non-tender, normal active bowel sounds  EXTR: pulses[EP1.1] palpable[EP1.2]. No clubbing, cyanosis or[EP1.1] significant[EP1.2] edema.   NEURO:[EP1.1] arousable, interacted, answering questions, but not consistently appropriately. Seems intermittently confused which was confirmed by friend at bedside.[EP1.2]     Diagnostics:  All labs and imaging were reviewed, of note:[EP1.1]    Lab Results   Component Value Date    NTBNPI 3531 (H) 04/06/2017    NTBNP 755 (H)  11/03/2016     Lab Results   Component Value Date    WBC 5.8 04/06/2017    HGB 9.5 (L) 04/06/2017    HCT 33.4 (L) 04/06/2017    MCV 75 (L) 04/06/2017     04/06/2017     Lab Results   Component Value Date     04/06/2017    POTASSIUM 4.0 04/06/2017    CHLORIDE 107 04/06/2017    CO2 23 04/06/2017     (H) 04/06/2017     Lab Results   Component Value Date    INR 2.02 (H) 04/06/2017     Lab Results   Component Value Date    BUN 11 04/06/2017    CR 0.68 04/06/2017     Lab Results   Component Value Date    TROPONIN 0.00 04/02/2017    TROPI 0.110 (H) 04/06/2017[EP1.3]       Lab Results   Component Value Date    TROPI 0.110 (H) 04/06/2017    TROPI 0.020 04/02/2017    TROPI  07/02/2016     <0.015  The 99th percentile for upper reference range is 0.045 ug/L.  Troponin values in   the range of 0.045 - 0.120 ug/L may be associated with risks of adverse   clinical events.      TROPI  01/09/2015     <0.015  The 99th percentile for upper reference range is 0.045 ug/L.  Troponin values in   the range of 0.045 - 0.120 ug/L may be associated with risks of adverse   clinical events.   Effective 7/30/2014, the reference range for this assay has changed to reflect   new instrumentation/methodology.      TROPI <0.012 10/29/2011    TROPONIN 0.00 04/02/2017    TROPONIN 0.00 01/09/2015       EKG 4/6/2017:  Rhythm: Atrial fibrillation - rapid and with RVR  Rate: Tachycardia  Axis: Normal  Ectopy: None  Conduction: Normal  ST Segments/ T Waves: No ST-T wave changes and No acute ischemic changes  Q Waves: None  Comparison to prior: When compared with ECG of 02-APR-2017 21:44, ST no longer depressed in Anterior leads     T wave inversion no longer evident in Inferior leads     T wave inversion no longer evident in Lateral leads    Clinical Impression: atrial fibrillation (chronic) with RVR    Echocardiogram 4/6/2017:   Interpretation Summary  Left ventricular function is normal. The visually estimated EF is 60-65%.  There is  basal to mid anteroseptal akinesis.  Right ventricular function, chamber size, wall motion, and thickness are normal.  No significant valvular abnormalities.  The inferior vena cava was normal in size with preserved respiratory variability.  This study was compared with the study from 7/3/2016 . The basal to mid anteroseptal akinesis is new.[EP1.1]    Bilateral Lower Extremity ultrasound 4/6/17:  IMPRESSION: No evidence of deep venous thrombosis in either lower extremity.    Stress NM Lexiscan (6/5/2014)  1. Normal myocardial SPECT study with a summed stress score of 2. A summed stress score of less than 4 is associated with an annual event rate of 0.5% and 0.3% for myocardial infarction and cardiac death, respectively (Caro. Circulation 1998;98:535-43).   2. No significant perfusion abnormalities.   3. Hyperdynamic left ventricular systolic function as described above.   4. No prior study available for comparison.    Life Watch (1/21/2013-2/3/2013)  Sinus rhythm with PAC's - symptoms possible correlated with atrial premature beats.    ECHO (10/29/2011)   Global and regional left ventricular function is normal with an EF of 55-60%. The transmitral Doppler filling pattern is restrictive and c/w increased left atrial pressure (diastolic dysfunction). Global right ventricular function is normal. Severe left atrial enlargement. Right ventricular systolic pressure is mildly elevated at 27 mmHg above the right atrial pressure. The inferior vena cava is dilated at 2.35 cm without respiratory variability, consistent with increased right atrial pressure. No pericardial effusion is present.[EP1.2]     Assessment and Recommendation:[EP1.1]  Sister Betty Tee is a 76 year old female with a past medication history of a.fib with controlled ventricular respone, LVH, ILD, HFpEF, DM type 2, ANGELICA, DVT who was admitted for AMS who is influenza positive and likely CAP and Cardiology consulted for abnormal finding on Echo  and elevated troponin. After reviewing the Echo images, abnormality noted is likely secondary to persistent a.fib and inability to lie still. No acute cardiac concerns at this time. Elevated troponin likely due to demand ischemia 2/2 afib and current acute illness with respiratory distress.[EP1.2]     1.[EP1.1] A.fib[EP1.2]  2.[EP1.1] Abnormal Echo results, see above[EP1.2]   3.[EP1.1] Acute hypoxic respiratory failure  4. Influenza, possible CAP  5. Sjogren's associated ILD and bronchiolitis obliterans  6. AMS  7. HFpEF, Euvolemic  8. HTN  9. DM - uncontrolled      Recommendations:  - no acute cardiac intervention indicated at this time  - may consider repeat Echo at resolution of acute illness  - if acute cardiac clinical changes, please contact Cardiology  - will need to follow up with Dr. Hurtado on discharge  - Cardiology will sign off at this time, but will be available if any acute cardiac changes occur this admission.[EP1.2]     I have discussed the above with [EP1.1] Jd[EP1.2].    Thank you for consulting the cardiovascular services at the [facility]. Please do not hesitate to call us with any questions.     Juana Batista MD  [facility] Cardiology Consult Team  Pager [phone] or [phone][EP1.1]       Revision History        User Key Date/Time User Provider Type Action    > EP1.2 4/6/2017  4:54 PM Juana Batista MD Resident Sign     EP1.3 4/6/2017  1:40 PM Juana Batista MD Resident      EP1.1 4/6/2017  1:38 PM Juana Batista MD Resident             Consults by Meghan Stone RN at 4/5/2017  3:32 PM     Author:  Meghan Stone RN Service:  Endocrinology Author Type:  Nurse    Filed:  4/5/2017  3:41 PM Date of Service:  4/5/2017  3:32 PM Note Created:  4/5/2017  3:28 PM    Status:  Signed :  Meghan Stone RN (Nurse)     Consult Orders:    1. Diabetes Educator IP Consult [[id number]] ordered by Coty Moreno MD at 04/04/17 9008     2.  Medication History IP Pharmacy Consult [[id number]] ordered by Abhishek Elizabeth MD at 04/03/17 2707                Diabetes Education    Received consult request to see this 76 year old female for diabetes education.  Patient with history of  Sjogren's Syndrome, interstitial lung disease, DM type II, HFpEF, DVT on Coumadin, left ventricular hypertrophy, afib on Metoprolol, and HTN who presented to the ED after her niece call EMS due to concern for AMS, and dizziness.[AM1.1]   Hemoglobin A1c on 4/4/17 was 14.3%.  Patient started on insulin therapy.  Current insulin regimen is:  Insulin glargine (Lantus) 14 units daily  Insulin aspart 1 unit/15 grams CHO  Medium correction scale pre-meal and hs.    Patient also on metformin xr 1000 mg daily and prednisone 30 mg daily.    Met with patient and roommate/friend Yvette.  Patient states she has had some diabetes education as outpatient, but found the information overwhelming.  Sounds like information was focused on carbohydrate counting and label reading.  She has not checked blood glucoses at home.  She has given herself some sort of injections in the past, but doesn't recall name or purpose of injections.    During session, patient easily distractible, looking out [place], going to toilet, and also took 3 phone calls.  Friend Yvette is hard of hearing.  Yvette took some notes as we talked.    Today focused session on use of blood glucose monitor. Betty was able to do a fingerstick and obtain result.  Discussed that for discharge we can come up with a less complex insulin plan and she was relieved to hear this.    Will see again tomorrow afternoon at 2 pm when Yvette can be present.  At this point, am concerned about Betty being safe to go home.  Discussed with RN.    Meghan Stone MS RN CDE CDTC  [phone][AM1.2]        Revision History        User Key Date/Time User Provider Type Action    > AM1.2 4/5/2017  3:41 PM Meghan Stone, RN Nurse Sign     AM1.1 4/5/2017  3:28 PM  Meghan Stone, RN Nurse             Consults by Ander Bain MD at 2017 10:15 PM     Author:  Ander Bain MD Service:  Neurology Author Type:  Resident    Filed:  2017  2:29 AM Date of Service:  2017 10:15 PM Note Created:  2017 10:15 PM    Status:  Cosign Needed :  Ander Bain MD (Resident)    Cosign Required:  Yes             [facility] [place]      Neurology Stroke Consult    Patient Name: Betty Tee  : 1940 MRN#: [id number]    STROKE DATA    Stroke Code:  Time called:  2017 2[EL1.1]157[EL1.2]  Time patient seen:[EL1.1]  2017[EL1.3] 2[EL1.1]157[EL1.2]  Onset of symptoms:[EL1.1]  2017[EL1.3] 2030  Last known normal (pt's baseline):[EL1.1]  2017[EL1.3] 2[EL1.1]157[EL1.2]  Head CT read by Dr Reagan at:[EL1.1]  2017[EL1.4] 2239    TPA treatment:  Not given due to minor / isolated / quickly resolving stroke symptoms.     National Institutes of Health Stroke Scale (at presentation)  NIHSS done at:  time patient seen      Score    Level of consciousness:  (0)   Alert, keenly responsive     LOC questions:  (0)   Answers both questions correctly    LOC commands:  (0)   Performs both tasks correctly    Best gaze:  (0)   Normal    Visual:  (0)   No visual loss    Facial palsy:  (0)   Normal symmetrical movements    Motor arm (left):  (0)   No drift    Motor arm (right):  (0)   No drift    Motor leg (left):  (0)   No drift    Motor leg (right):  (0)   No drift    Limb ataxia:  (0)   Absent    Sensory:  (0)   Normal- no sensory loss    Best language:  (0)   Normal- no aphasia    Dysarthria:  (0)   Normal    Extinction and inattention:  (0)   No abnormality        NIHSS Total Score:  0        Dysphagia Screen  Time of screening:[EL1.1] 2017[EL1.5] 2244  Screening results: Passed screening, no dysarthria - Regular Diet with thin liquids     ASSESSMENT & RECOMMENDATIONS     Impression:  77 yo f former smoker with h/o  T2DM, a fib with RVR (on warfarin), HTN, and HLD presents for brief episode of delayed response in conversation. The patient reports that she just feels tired. NIHSS 0. CT/CTA without acute abnormalities. No current or h/o localizing symptoms to suggest cerebrovascular pathology.     Recommendations:   -no further stroke w/u necessary    HPI  Betty Tee is a 76 year old female with h/o T2DM, HTN, HLD, and a fib (on warfarin) presents for brief episode of delayed response in conversation. The patient adamantly denies anything is wrong and instead reports that she just feels tired. Her roommate confirms that there was just a brief episode of delayed response in conversation but no slurred/garbled speech. The patient denies any loss of speech comprehension, weakness, numbness, clumsiness, dyarthria, vision changes, or headache. She denies any recent illness, though her fever in the ED was measured to be >102F.      Pertinent Past Medical/Surgical History  Past Medical History:   Diagnosis Date     Alcohol abuse, in remission      Allergic rhinitis, cause unspecified     allegra helps when she takes it     Antiplatelet or antithrombotic long-term use      Atrial fibrillation (H)     in hosp in 11/11 after surgery w/ fluid overload     Cardiomegaly     LVH on stress echo- cardiac w/u at [facility] ER- neg CT scan for PE, neg stress echo in 8/06     Chest pain, unspecified      Disorder of bone and cartilage, unspecified     osteopenia (had been on prempro), improved on 6/06 dexa, stable dexa 11/10     Diverticulosis of colon (without mention of hemorrhage)     last episode yrs ago     Essential hypertension, benign      Gastro-oesophageal reflux disease      Insomnia, unspecified     weaned off clonazepam     Irregular heart beat      Lumbago 7/09    MRI with DJD, now seeing Dr. Cain for sciatic sx's     Major depressive disorder, recurrent episode, moderate (H)      Obstructive sleep apnea      Osteoarthrosis,  unspecified whether generalized or localized, unspecified site      Sjogren's syndrome (H)      Sleep apnea      Tobacco use disorder     chantix in 9/07, started again in 6/08, working       Past Surgical History:   Procedure Laterality Date     BACK SURGERY  1962     BIOPSY BREAST  9/27/02    Biopsy Left Breast     C APPENDECTOMY  1970's?     C NONSPECIFIC PROCEDURE  11/05    exploratory abd lap, adhesions, resolved RLQ pain, diverticulitis episodes     CARDIAC SURGERY       CHOLECYSTECTOMY  1990's?     COLECTOMY LEFT  11/7/2011    Procedure:COLECTOMY LEFT; Laparoscopic mobilization of splenic flexture, sigmoid colectomy, coloprotoscopy, loop illeostomy; Surgeon:CK CASTANEDA; Location:[facility] OR     HYSTERECTOMY TOTAL ABDOMINAL, BILATERAL SALPINGO-OOPHORECTOMY, COMBINED  11/7/2011    Procedure:COMBINED HYSTERECTOMY TOTAL ABDOMINAL, BILATERAL SALPINGO-OOPHORECTOMY; total abdominal hysterectomy, bilateral salpingo-oophorectomy; Surgeon:ALETA MANUEL; Location:[facility] OR     INSERT STENT URETER  11/7/2011    Procedure:INSERT STENT URETER; Placement of Bilateral Ureteral Stents ; Surgeon:PRANEETH BRYANT; Location:[facility] OR     SIGMOIDOSCOPY FLEXIBLE  11/3/2011    Procedure:SIGMOIDOSCOPY FLEXIBLE; Flexible Sigmoidoscopy; Surgeon:CK CASTANEDA; Location:[facility] OR     TAKEDOWN ILEOSTOMY  2/1/2012    Procedure:TAKEDOWN ILEOSTOMY; Takedown Loop Ileostomy ; Surgeon:CK CASTANEDA; Location:[facility] OR       Medications: I have reviewed this patient's current medications.    Allergies: All allergies reviewed and addressed.    Family History: I have reviewed this patient's family history.    Social History: I have reviewed this patient's social history.    Tobacco use: Former smoker    ROS:  The 10 point Review of Systems is negative other than noted in the HPI or here.     PHYSICAL EXAMINATION  Vital Signs:  B/P: 125/84,  T: 102.6,  P: 122,  R: 17    General:  patient lying in bed without any acute distress    HEENT:   normocephalic/atraumatic  Cardio:  irregularly irregular  Pulmonary:  no respiratory distress  Abdomen:  soft  Extremities:  peripheral pulses palpable  Skin:  intact     Neurologic  Mental Status:  fully alert, attentive and oriented, follows commands, speech clear and fluent  Cranial Nerves:  visual fields intact, PERRL, EOMI with normal smooth pursuit, facial sensation intact and symmetric, facial movements symmetric, hearing not formally tested but intact to conversation, palate elevation symmetric and uvula midline, no dysarthria, shoulder shrug strong bilaterally, tongue protrusion midline  Motor:  no abnormal movements, normal tone throughout, normal muscle bulk, no pronator drift, normal and symmetric rapid finger tapping, able to move all limbs spontaneously, strength 5/5 throughout upper and lower extremities  Sensory:  intact/symmetric to light touch and pin prick throughout upper and lower extremities  Coordination:  FNF and HS intact without dysmetria    Labs  Labs and Imaging reviewed and used in developing the plan; pertinent results included.     Lab Results   Component Value Date     (H) 04/02/2017       The patient was discussed with the Fellow, Dr. Reagan.  The staff is Dr. Gonzalez.    Ander Bain  Pager: [phone][EL1.1]     Revision History        User Key Date/Time User Provider Type Action    > EL1.2 4/4/2017  2:29 AM Ander Bain MD Resident Sign     EL1.5 4/2/2017 11:09 PM Ander Bain MD Resident Sign     EL1.4 4/2/2017 11:00 PM Ander Bain MD Resident      EL1.3 4/2/2017 10:59 PM Ander Bain MD Resident      EL1.1 4/2/2017 10:53 PM Ander Bain MD Resident                      Progress Notes - Physician (Notes for yesterday and today)      Progress Notes by Beatriz Painting MD at 4/10/2017  6:22 AM     Author:  Beatriz Painting MD Service:  Family Medicine Author Type:  Resident    Filed:  4/10/2017  5:06 PM Date of Service:  4/10/2017  6:22 AM Note  Created:  4/10/2017  6:22 AM    Status:  Attested :  Beatriz Painting MD (Resident)    Cosigner:  Lenny Greenwood MD at 4/10/2017  7:50 PM        Attestation signed by Lenny Greenwood MD at 4/10/2017  7:50 PM        Attestation:  This patient has been seen and evaluated by me, Lenny Greenwodo on 4/10/2017.  I saw and discussed the case with the primary resident and the care team. I agree with the findings and plan in this note. I have reviewed today's vital signs, medications, laboratory results and imaging results.   Code for today's visit :55641  Inpatient Subsequent Moderate complexity/severity  Lenny Greenwood MD  [facility] - Inpatient daily progress note    Date of admission: 4/2/2017  Date of service: 4/[JB1.1]10[JB1.2]/2017.[JB1.1]    Updates:  - Diabetes education today[JB1.3]           Assessment and Plan:      Betty Tee is a 76 year old who was admitted AMS and  for evaluation of persistent cough, dizziness and found to be positive for influenza. She developed an  acute hypoxic respiratory failure 4/6 that is due to pulmonary congestion.     Patient Active Problem List   Diagnosis     Temporomandibular joint disorder     Diverticulitis of colon     Disorder of bone and cartilage     Osteoarthritis     Alcohol abuse, in remission     Restless legs syndrome (RLS)     Major depressive disorder, recurrent episode, moderate     Essential hypertension with goal blood pressure less than 140/90     CARDIOVASCULAR SCREENING; LDL GOAL LESS THAN 130     Sciatica     Advanced directives, counseling/discussion     Colouterine fistula     Atrial fibrillation with controlled ventricular response (H)     Left ventricular hypertrophy     Health Care Home     Insomnia     Joint pains     Allergic reaction caused by a drug - likely plaquenil     Breast fibroadenoma     DVT (deep venous thrombosis) (H)     Fatty liver disease,  nonalcoholic     Rib pain     Neck mass     Gastroesophageal reflux disease without esophagitis     Enlarged lymph node     Sjogren's syndrome (H)     ANGELICA (obstructive sleep apnea)     ILD (interstitial lung disease) (H)     Lower GI bleed     Acute and chronic respiratory failure with hypoxia (H)     Acute edema of lung (H)     (HFpEF) heart failure with preserved ejection fraction (H)     Type 2 diabetes mellitus without complication, without long-term current use of insulin (H)     CAP (community acquired pneumonia)     HCAP (healthcare-associated pneumonia)     Influenza B     Heart failure, chronic, with acute decompensation (H)     Type 2 diabetes mellitus with hyperglycemia (H)         ## Acute hypoxic respiratory failure, pulmonary congestion :  Improving  Multifactorial from acute decompensation of her HFpEF in setting of acute respiratory illness and holding her diuretics in context of ILD.  Her oxygen requirement has decreased after diuresis . Currently not needing oxygen at rest but needs oxygen support when walking (uses 4L at home with exertion/walking).    Plan:  - Will continue home Lasix 40 gm BID and Spironolactone 25 mg daily   - Add oral potassium (20 meq daily)  -Continue Metoprolol   -Daily weights  -Input and Output   -IV Lasix as needed     ## HFpEF, with recent decompensation, improved   Plan:  - as above   -Low salt diet     ##Possible Hospital Acquired Pneumonia   ##Influenza:  ##ILD   ##Reactive Airway   Patient on last day of Tamiflu.  At risk for Staph Pneumonia but negative nasal  MRSA . No red flags for Staph pneumonia. Antibiotics was broadened during acute respiratory failure. Her  blood culture has been negative and pro calcitonin level is not suggestive of a bacterial infection .  However  her xray today showed more haziness on the left lung field that superimposed bacterial infection can not be ruled out.     Plan:   - IV antibiotics (Azithromycin & Ceftriaxone followed by Vanc &  Zosyn) 4/3-4/8, Levofloxacin started 4/8.  Plan for 7-14 days for all antibiotics  - Follow up blood cultures   - Bipap at night[JB1.1]  If tolerated[JB1.3]  - On baseline prednisone 10 mg daily , we have increased this to 40 mg during decompensation . -[JB1.1] S[JB1.3]tart[JB1.1]ed[JB1.3] wean[JB1.1]ing yesterday (currently at 30 mg)[JB1.3], go down by 10 mg every 3 days until at 10 mg daily.   - Continue fluticasone daily  - Continue duonebs q 4 hours  - Continue albuterol nebs q 2 hours prn  - Lactobacillus  - Robitussin for cough     ## Dizziness/AMS- resolved  Likely multifactorial : Hypoxia and acute respiratory illness. Negative ACS and stroke work up.   Plan :   Observe, address underlying etiology      ## A Fib ,rate controlled   INR goal 2.0-3.0,[JB1.1] INR currently at goal[JB1.3]  Plan:   -continue Metoprolol   - Hold coumadin today    ## HTN , within acceptable ranges     Plan:   - Cont metoprolol 100 mg daily.  - Continue spironolactone 25 mg daily    ## Diabetes Mellitus- uncontrolled  Hgb A1c 14.3 (4/2017).  Was started on an Insulin drip 4/7 for uncontrolled hyperglycemia secondary to recent increase steroid dose. Drip discontinued 4/8.  Blood sugars still not stable    Plan:   - Increase Lantus from 19 to 23 units  - Increase Prandial Coverage from 1 unit /13 gram CHO to 1 unit/9 grams CHO  - Continue high ISS  - Restart Metformin 1000 mg daily  - Diabetes education   - Start low dose ACE inhibitor (lisinopril 2.5 mg daily)    ## ASCVD Risk:  ASCVD risk calculated.  10 year risk is 42.6%.  - Start high intensity statin (atorvastatin 40 mg daily)    ##ANGELICA  Plan:   -Bipap at night     ##Sjorgren Syndrome, Stable   Plan:   -Will observe     ## Physical Deconditioning  - PT/OT    Daily cares -   F:none  E:stable  N:regular diet  Lines:PIV  Activity:-Up as tolerated  CODE:Full Code  Prophylaxis:On warfarin  PCP communication:  - Ilene Tristan     Dispo: Expected discharge date[JB1.1]:  tomorrow[JB1.3]             Interval History:   Betty Tee[JB1.1] has no complaints this morning.[JB1.2]      Overall, she states her breathing has improved and she is feeling better.  She ambulated yesterday with oxygen (uses oxygen with activity at home).                Review of Systems:   Review of Systems   Constitutional: Negative for chills and fever.   HENT: Negative for congestion.    Respiratory: Positive for cough and shortness of breath (with ambulation).    Cardiovascular: Negative for chest pain.   Gastrointestinal: Negative for abdominal pain, constipation, diarrhea, nausea and vomiting.   Neurological: Negative for dizziness, light-headedness and headaches.   Psychiatric/Behavioral: Negative for confusion. The patient is not nervous/anxious.             Physical Exam (Resident / Clinician):   Vitals were reviewed  Temp: 98  F (36.7  C) Temp src: Oral BP: 145/80   Heart Rate: 97 Resp: 20 SpO2: 95 % O2 Device: None (Room air) Oxygen Delivery: Bipap 10/5, 40% FiO2    Physical Exam   Constitutional: She is oriented to person, place, and time. She appears well-developed and well-nourished. No distress.[JB1.1]   Sitting in bed, not in distress[JB1.2]   Neck: Normal range of motion. No JVD present.   Cardiovascular: Normal rate and intact distal pulses.    No murmur heard.  Pulmonary/Chest: Effort normal. No stridor. No respiratory distress. She has no wheezes.[JB1.1]   Crackles at bases bilaterally[JB1.3]   Abdominal: Soft. Bowel sounds are normal. She exhibits no distension. There is no tenderness.   Musculoskeletal: Normal range of motion. She exhibits edema (trace edema bilaterally). She exhibits no tenderness.   Neurological: She is alert and oriented to person, place, and time.   Skin: Skin is warm and dry.   Psychiatric: She has a normal mood and affect. Her behavior is normal. Judgment and thought content normal.   Vitals reviewed.          Data:   ROUTINE LABS (Last four  results)  CMP[JB1.1]    Recent Labs  Lab 04/10/17  0850 04/09/17  0514 04/08/17  0345 04/07/17  0313 04/06/17  1944 04/06/17  0723 04/05/17  0726    142 143 140 140 140 140   POTASSIUM 3.5 3.5 4.0 4.1 3.9 4.0 4.0   CHLORIDE 105 104 107 105 107 107 106   CO2 27 26 24 24 25 23 25   ANIONGAP 7 12 12 10 8 10 8   * 205* 138* 229* 291* 213* 130*   BUN 14 15 14 14 12 11 13   CR 0.65 0.68 0.55 0.61 0.61 0.68 0.68   GFRESTIMATED 88 84 >90Non  GFR Calc >90Non  GFR Calc >90Non  GFR Calc 83 85   GFRESTBLACK >90African American GFR Calc >90African American GFR Calc >90African American GFR Calc >90African American GFR Calc >90African American GFR Calc >90African American GFR Calc >90African American GFR Calc   CLAUDY 8.8 8.4* 8.1* 7.8* 7.8* 8.3* 8.6   MAG 2.2  --  2.3  --  2.0 2.1 2.0   PHOS 2.0*  --   --   --  2.2* 3.2 2.4*[JB1.4]     CBC[JB1.1]    Recent Labs  Lab 04/10/17  0850 04/09/17  0514 04/08/17  0345 04/07/17  0313   WBC 7.9 7.6 6.8 4.4   RBC 4.42 4.08 3.88 4.19   HGB 9.2* 8.4* 8.1* 8.8*   HCT 32.8* 30.5* 28.0* 31.6*   MCV 74* 75* 72* 75*   MCH 20.8* 20.6* 20.9* 21.0*   MCHC 28.0* 27.5* 28.9* 27.8*   RDW 19.2* 19.2* 19.0* 19.1*    233 116* 193[JB1.4]     INR[JB1.1]    Recent Labs  Lab 04/10/17  0850 04/09/17  0514 04/08/17  0345 04/07/17  0313   INR 2.47* 3.76* 3.09* 2.03*[JB1.5]     CRP[JB1.1]    Recent Labs  Lab 04/06/17  0723 04/04/17  0858   CRP 64.0* 54.0*[JB1.4]         Recent Labs  Lab 04/06/17  1055 04/06/17  1028 04/03/17  1045   CULT No growth after 4 days No growth after 4 days Light growth Normal juan[JB1.6]     CXR 4/8:  IMPRESSION: Worsening perihilar opacities, left greater right.  Findings likely indicate worsening pulmonary edema.          Medications:     Current Facility-Administered Medications   Medication     predniSONE (DELTASONE) tablet 30 mg     insulin glargine (LANTUS) injection 23 Units     lisinopril (PRINIVIL/Zestril) tablet  2.5 mg     warfarin-No DOSE today     metFORMIN (GLUCOPHAGE-XR) 24 hr tablet 1,000 mg     atorvastatin (LIPITOR) tablet 40 mg     potassium chloride SA (K-DUR/KLOR-CON M) CR tablet 20 mEq     acetylcysteine (MUCOMYST) 20 % nebulizer solution 2 mL     ipratropium - albuterol 0.5 mg/2.5 mg/3 mL (DUONEB) neb solution 3 mL     levofloxacin (LEVAQUIN) tablet 750 mg     insulin aspart (NovoLOG) inj (RAPID ACTING)     insulin aspart (NovoLOG) inj (RAPID ACTING)     insulin aspart (NovoLOG) inj (RAPID ACTING)     insulin aspart (NovoLOG) inj (RAPID ACTING)     insulin aspart (NovoLOG) inj (RAPID ACTING)     insulin aspart (NovoLOG) inj (RAPID ACTING)     furosemide (LASIX) tablet 40 mg     pantoprazole (PROTONIX) EC tablet 40 mg     glucose 40 % gel 15-30 g    Or     dextrose 50 % injection 25-50 mL    Or     glucagon injection 1 mg     dextrose 10 % 1,000 mL infusion     traZODone (DESYREL) half-tab 25-50 mg     spironolactone (ALDACTONE) tablet 25 mg     acetaminophen (TYLENOL) tablet 1,000 mg    Or     acetaminophen (TYLENOL) Suppository 650 mg     lactobacillus rhamnosus (GG) (CULTURELL) capsule 1 capsule     fluticasone (FLONASE) 50 MCG/ACT spray 1-2 spray     fluticasone furoate (ARNUITY ELLIPTA) 200 MCG/ACT inhalation powder 1 puff     ketoconazole (NIZORAL) 2 % cream     polyethylene glycol (MIRALAX/GLYCOLAX) Packet 17 g     montelukast (SINGULAIR) tablet 10 mg     PARoxetine (PAXIL) tablet 10 mg     Warfarin Therapy Reminder (Check START DATE - warfarin may be starting in the FUTURE)     naloxone (NARCAN) injection 0.1-0.4 mg     lidocaine 1 % 1 mL     lidocaine (LMX4) kit     sodium chloride (PF) 0.9% PF flush 3 mL     sodium chloride (PF) 0.9% PF flush 3 mL     Patient is already receiving anticoagulation with heparin, enoxaparin (LOVENOX), warfarin (COUMADIN)  or other anticoagulant medication     albuterol neb solution 2.5 mg     guaiFENesin-dextromethorphan (ROBITUSSIN DM) 100-10 MG/5ML syrup 10 mL      potassium chloride SA (K-DUR/KLOR-CON M) CR tablet 20-40 mEq     potassium chloride (KLOR-CON) Packet 20-40 mEq     potassium chloride 10 mEq in 100 mL intermittent infusion     potassium chloride 10 mEq in 100 mL intermittent infusion with 10 mg lidocaine     potassium chloride 20 mEq in 50 mL intermittent infusion     metoprolol (TOPROL-XL) 24 hr tablet 100 mg       Caring Physician: Beatriz Painting MD  [facility]  Pager Contact: see Physician sticky note[JB1.1]       Revision History        User Key Date/Time User Provider Type Action    > JB1.4 4/10/2017  5:06 PM Beatriz Painting MD Resident Sign     JB1.3 4/10/2017  5:02 PM Beatriz Painting MD Resident      JB1.5 4/10/2017  9:42 AM Beatriz Painting MD Resident      JB1.6 4/10/2017  8:58 AM Beatriz Painting MD Resident      JB1.2 4/10/2017  8:54 AM Beatriz Painting MD Resident      JB1.1 4/10/2017  6:22 AM Beatriz Painting MD Resident                   Procedure Notes     No notes of this type exist for this encounter.         Progress Notes - Therapies (Notes from 04/08/17 through 04/11/17)      Progress Notes by Héctor Cornejo PT at 4/8/2017  2:10 PM     Author:  Héctor Cornejo, PT Service:  (none) Author Type:  Physical Therapist    Filed:  4/8/2017  2:10 PM Date of Service:  4/8/2017  2:10 PM Note Created:  4/8/2017  2:10 PM    Status:  Signed :  Héctor Cornejo, PT (Physical Therapist)          04/08/17 1100   Quick Adds   Type of Visit Initial PT Evaluation       Present no   Living Environment   Lives With friend(s)   Living Arrangements house   Home Accessibility stairs to enter home   Number of Stairs to Enter Home 3  (1 rail)   Number of Stairs Within Home 10  (1 rail)   Transportation Available car;family or friend will provide   Living Environment Comment friend/roommate available to help as needed   Self-Care   Dominant Hand right   Usual Activity  Tolerance good   Current Activity Tolerance moderate   Regular Exercise no   Equipment Currently Used at Home oxygen;other (see comments)  (has 4WW but does not use)   Functional Level Prior   Ambulation 0-->independent   Transferring 0-->independent   Toileting 0-->independent   Bathing 0-->independent   Dressing 0-->independent   Eating 0-->independent   Communication 0-->understands/communicates without difficulty   Swallowing 0-->swallows foods/liquids without difficulty   Cognition 0 - no cognition issues reported   Fall history within last six months yes   Number of times patient has fallen within last six months 1  (fell off chair when chair leg broke)   Prior Functional Level Comment independent with all functional mobility and ADLs,  used home O2 prn, has 4WW but does not use   General Information   Onset of Illness/Injury or Date of Surgery - Date 04/02/17   Referring Physician Cynthia Sánchez MD   Patient/Family Goals Statement return home   Pertinent History of Current Problem (include personal factors and/or comorbidities that impact the POC) Betty Tee is a 76 year old who was admitted AMS and  for evaluation of persistent cough, dizziness and found to be positive for influenza, went into acute hypoxic respiratory failure 4/6.   Cognitive Status Examination   Orientation orientation to person, place and time   Level of Consciousness alert   Follows Commands and Answers Questions 100% of the time   Personal Safety and Judgment intact   Pain Assessment   Patient Currently in Pain No   Integumentary/Edema   Integumentary/Edema no deficits were identifed   Posture    Posture Forward head position;Protracted shoulders   Range of Motion (ROM)   ROM Comment B LE grossly WNL   Strength   Strength Comments B LE grossly 5/5   Bed Mobility   Bed Mobility Comments independent   Transfer Skills   Transfer Comments sit > stand SBA   Gait   Gait Comments ambulate in room with SBA   Balance   Balance  "Comments stand without UE support   Sensory Examination   Sensory Perception no deficits were identified   General Therapy Interventions   Planned Therapy Interventions balance training;gait training;progressive activity/exercise   Clinical Impression   Criteria for Skilled Therapeutic Intervention yes, treatment indicated   PT Diagnosis impaired functional mobility 2/2 influenza   Influenced by the following impairments decreased balance, decreased functional endurance   Functional limitations due to impairments impaired gait   Clinical Presentation Evolving/Changing   Clinical Presentation Rationale clinical judgment   Clinical Decision Making (Complexity) Low complexity   Therapy Frequency` daily   Predicted Duration of Therapy Intervention (days/wks) 3 days   Anticipated Discharge Disposition Home with Assist   Risk & Benefits of therapy have been explained Yes   Patient, Family & other staff in agreement with plan of care Yes   [facility] AM-PAC  \"6 Clicks\" V.2 Basic Mobility Inpatient Short Form   1. Turning from your back to your side while in a flat bed without using bedrails? 4 - None   2. Moving from lying on your back to sitting on the side of a flat bed without using bedrails? 4 - None   3. Moving to and from a bed to a chair (including a wheelchair)? 4 - None   4. Standing up from a chair using your arms (e.g., wheelchair, or bedside chair)? 4 - None   5. To walk in hospital room? 4 - None   6. Climbing 3-5 steps with a railing? 3 - A Little   Basic Mobility Raw Score (Score out of 24.Lower scores equate to lower levels of function) 23   Total Evaluation Time   Total Evaluation Time (Minutes) 7[JD1.1]        Revision History        User Key Date/Time User Provider Type Action    > JD1.1 4/8/2017  2:10 PM Héctor Cornejo, PT Physical Therapist Sign                                                      INTERAGENCY TRANSFER FORM - LAB / IMAGING / EKG / EMG RESULTS   4/2/2017                       " "[address] [facility] BANK: [phone]            Unresulted Labs (24h ago through future)    Start       Ordered    04/04/17 0600  INR  (warfarin (COUMADIN) Pharmacy Consult-INITIAL ORDER)  DAILY,   Routine      04/03/17 0353    04/04/17 0600  INR  (warfarin (COUMADIN) Pharmacy Consult-INITIAL ORDER)  DAILY,   Routine      04/03/17 0638    Unscheduled  Glucose - Diabetes  CONDITIONAL X 1 STAT,   STAT     Comments:  for changes in mental status, diaphoresis, or unexplained tachycardia    04/07/17 1711    Unscheduled  Potassium  (Potassium Replacement - \"Standard\" - For K levels less than 3.4 mmol/L - UU,UR,UA,RH,SH,PH,WY )  CONDITIONAL (SPECIFY),   Routine     Comments:  Obtain Potassium Level for these conditions:  *IF no potassium result within 24 hours before initiation of order set, draw potassium level with next lab collect.    *2 HOURS AFTER last IV potassium replacement dose and 4 hours after an oral replacement dose.  *Next morning after potassium dose.     Repeat Potassium Replacement if necessary.    04/10/17 1400    Unscheduled  Magnesium  (Magnesium Replacement -  Adult - \"Standard\" - Replacement for all levels less than 1.6 mg/dL )  CONDITIONAL (SPECIFY),   Routine     Comments:  Obtain Magnesium Level for these conditions:  *IF no magnesium result within 24 hrs before initiation of order set, draw magnesium level with next lab collect.    *2 HOURS AFTER last magnesium replacement dose when magnesium replacement given for level less than 1.6   *Next morning after magnesium dose.     Repeat Magnesium Replacement if necessary.    04/10/17 1400    Unscheduled  Phosphorus  (POTASSIUM Phosphate - \"Standard\" - Replacement for levels less than or equal to 2.4 mg/dL )  CONDITIONAL (SPECIFY),   Routine     Comments:  Obtain Phosphorus Level for these conditions:  *IF no phosphorus result within 24 hrs before initiation of order set, draw phosphorus level with next lab collect.    *2 HOURS AFTER last phosphorus " replacement dose for levels less than 2.0.  *Next morning after phosphorus dose.     Repeat Phosphorus Replacement if necessary.    04/10/17 1400         Lab Results - 3 Days      Glucose by meter [[id number]] (Abnormal)  Resulted: 04/11/17 1201, Result status: Final result    Ordering provider: Meghann White MD  04/11/17 1154 Resulting lab: POINT OF CARE TEST, GLUCOSE    Specimen Information    Type Source Collected On     04/11/17 1154          Components       Value Reference Range Flag Lab   Glucose 124 70 - 99 mg/dL H 170            Glucose by meter [[id number]]  Resulted: 04/11/17 0801, Result status: Final result    Ordering provider: Meghann White MD  04/11/17 0748 Resulting lab: POINT OF CARE TEST, GLUCOSE    Specimen Information    Type Source Collected On     04/11/17 0748          Components       Value Reference Range Flag Lab   Glucose 98 70 - 99 mg/dL  170            Basic metabolic panel [[id number]] (Abnormal)  Resulted: 04/11/17 0745, Result status: Final result    Ordering provider: Beatriz Painting MD  04/11/17 0000 Resulting lab: [facility]    Specimen Information    Type Source Collected On   Blood  04/11/17 0651          Components       Value Reference Range Flag Lab   Sodium 143 133 - 144 mmol/L  51   Potassium 3.6 3.4 - 5.3 mmol/L  51   Chloride 106 94 - 109 mmol/L  51   Carbon Dioxide 27 20 - 32 mmol/L  51   Anion Gap 10 3 - 14 mmol/L  51   Glucose 91 70 - 99 mg/dL  51   Urea Nitrogen 14 7 - 30 mg/dL  51   Creatinine 0.69 0.52 - 1.04 mg/dL  51   GFR Estimate 82 >60 mL/min/1.7m2  51   Comment:  Non  GFR Calc   GFR Estimate If Black -- >60 mL/min/1.7m2  51   Result:         >90   GFR Calc     Calcium 8.4 8.5 - 10.1 mg/dL L 51   Result:              Magnesium [[id number]]  Resulted: 04/11/17 0745, Result status: Final result    Ordering provider: Beatriz Painting MD  04/11/17 0000 Resulting lab: [facility] [place]    Specimen Information    Type Source Collected On   Blood  04/11/17 0651          Components       Value Reference Range Flag Lab   Magnesium 2.0 1.6 - 2.3 mg/dL  51            Phosphorus [[id number]]  Resulted: 04/11/17 0745, Result status: Final result    Ordering provider: Beatriz Painting MD  04/11/17 0000 Resulting lab: [facility]    Specimen Information    Type Source Collected On   Blood  04/11/17 0651          Components       Value Reference Range Flag Lab   Phosphorus 3.4 2.5 - 4.5 mg/dL  51            Glucose by meter [[id number]] (Abnormal)  Resulted: 04/11/17 0736, Result status: Final result    Ordering provider: Meghann White MD  04/10/17 2146 Resulting lab: POINT OF CARE TEST, GLUCOSE    Specimen Information    Type Source Collected On     04/10/17 2146          Components       Value Reference Range Flag Lab   Glucose 216 70 - 99 mg/dL H 170            INR [[id number]] (Abnormal)  Resulted: 04/11/17 0729, Result status: Final result    Ordering provider: Mikey Elizabeth MD  04/11/17 0000 Resulting lab: [facility]    Specimen Information    Type Source Collected On   Blood  04/11/17 0651          Components       Value Reference Range Flag Lab   INR 2.05 0.86 - 1.14 H 51            CBC with platelets differential [[id number]] (Abnormal)  Resulted: 04/11/17 0722, Result status: Final result    Ordering provider: Beatriz Painting MD  04/11/17 0000 Resulting lab: [facility]    Specimen Information    Type Source Collected On   Blood  04/11/17 0651          Components       Value Reference Range Flag Lab   WBC 9.1 4.0 - 11.0 10e9/L  51   RBC Count 4.60 3.8 - 5.2 10e12/L  51   Hemoglobin 9.6 11.7 - 15.7 g/dL L 51   Hematocrit 33.6 35.0 - 47.0 % L 51   MCV 73 78 - 100 fl L 51   MCH 20.9 26.5 - 33.0 pg L 51   MCHC 28.6 31.5 - 36.5 g/dL L 51   RDW 19.2 10.0 -  15.0 % H 51   Platelet Count 269 150 - 450 10e9/L  51   Diff Method Automated Method   51   % Neutrophils 70.7 %  51   % Lymphocytes 21.4 %  51   % Monocytes 7.2 %  51   % Eosinophils 0.4 %  51   % Basophils 0.0 %  51   % Immature Granulocytes 0.3 %  51   Nucleated RBCs 0 0 /100  51   Absolute Neutrophil 6.4 1.6 - 8.3 10e9/L  51   Absolute Lymphocytes 1.9 0.8 - 5.3 10e9/L  51   Absolute Monocytes 0.7 0.0 - 1.3 10e9/L  51   Absolute Eosinophils 0.0 0.0 - 0.7 10e9/L  51   Absolute Basophils 0.0 0.0 - 0.2 10e9/L  51   Abs Immature Granulocytes 0.0 0 - 0.4 10e9/L  51   Absolute Nucleated RBC 0.0   51            Blood culture [[id number]]  Resulted: 04/11/17 0452, Result status: Preliminary result    Ordering provider: Beatriz Painting MD  04/06/17 0749 Resulting lab: MICRO RAPID TESTING LAB    Specimen Information    Type Source Collected On   Blood  04/06/17 1028          Components       Value Reference Range Flag Lab   Specimen Description Blood Right Hand   75   Culture Micro No growth after 5 days   226   Micro Report Status Pending   226            Blood culture [[id number]]  Resulted: 04/11/17 0452, Result status: Preliminary result    Ordering provider: Beatriz Painting MD  04/06/17 0749 Resulting lab: MICRO RAPID TESTING LAB    Specimen Information    Type Source Collected On   Blood  04/06/17 1055          Components       Value Reference Range Flag Lab   Specimen Description Blood Left Hand   75   Culture Micro No growth after 5 days   226   Micro Report Status Pending   226            Glucose by meter [[id number]] (Abnormal)  Resulted: 04/11/17 0310, Result status: Final result    Ordering provider: Meghann White MD  04/11/17 0212 Resulting lab: POINT OF CARE TEST, GLUCOSE    Specimen Information    Type Source Collected On     04/11/17 0212          Components       Value Reference Range Flag Lab   Glucose 108 70 - 99 mg/dL H 170            Glucose by meter [[id number]] (Abnormal)  Resulted:  04/10/17 1925, Result status: Final result    Ordering provider: Meghann White MD  04/10/17 1800 Resulting lab: POINT OF CARE TEST, GLUCOSE    Specimen Information    Type Source Collected On     04/10/17 1800          Components       Value Reference Range Flag Lab   Glucose 249 70 - 99 mg/dL H 170            Glucose by meter [[id number]] (Abnormal)  Resulted: 04/10/17 1231, Result status: Final result    Ordering provider: Meghann White MD  04/10/17 0829 Resulting lab: POINT OF CARE TEST, GLUCOSE    Specimen Information    Type Source Collected On     04/10/17 0829          Components       Value Reference Range Flag Lab   Glucose 115 70 - 99 mg/dL H 170            Glucose by meter [[id number]] (Abnormal)  Resulted: 04/10/17 1221, Result status: Final result    Ordering provider: Meghann White MD  04/10/17 1213 Resulting lab: POINT OF CARE TEST, GLUCOSE    Specimen Information    Type Source Collected On     04/10/17 1213          Components       Value Reference Range Flag Lab   Glucose 174 70 - 99 mg/dL H 170            Magnesium [[id number]]  Resulted: 04/10/17 0947, Result status: Final result    Ordering provider: Beatriz Painting MD  04/09/17 2200 Resulting lab: [facility]    Specimen Information    Type Source Collected On   Blood  04/10/17 0850          Components       Value Reference Range Flag Lab   Magnesium 2.2 1.6 - 2.3 mg/dL  51            Phosphorus [[id number]] (Abnormal)  Resulted: 04/10/17 0947, Result status: Final result    Ordering provider: Beatriz Painting MD  04/09/17 2200 Resulting lab: [facility]    Specimen Information    Type Source Collected On   Blood  04/10/17 0850          Components       Value Reference Range Flag Lab   Phosphorus 2.0 2.5 - 4.5 mg/dL L 51            Basic metabolic panel [[id number]] (Abnormal)  Resulted: 04/10/17 0947, Result status: Final result    Ordering provider:  Beatriz Painting MD  04/09/17 2200 Resulting lab: [facility]    Specimen Information    Type Source Collected On   Blood  04/10/17 0850          Components       Value Reference Range Flag Lab   Sodium 139 133 - 144 mmol/L  51   Potassium 3.5 3.4 - 5.3 mmol/L  51   Chloride 105 94 - 109 mmol/L  51   Carbon Dioxide 27 20 - 32 mmol/L  51   Anion Gap 7 3 - 14 mmol/L  51   Glucose 133 70 - 99 mg/dL H 51   Urea Nitrogen 14 7 - 30 mg/dL  51   Creatinine 0.65 0.52 - 1.04 mg/dL  51   GFR Estimate 88 >60 mL/min/1.7m2  51   Comment:  Non  GFR Calc   GFR Estimate If Black -- >60 mL/min/1.7m2  51   Result:         >90   GFR Calc     Calcium 8.8 8.5 - 10.1 mg/dL  51   Result:              CBC with platelets differential [[id number]] (Abnormal)  Resulted: 04/10/17 0935, Result status: Final result    Ordering provider: Beatriz Painting MD  04/09/17 2200 Resulting lab: [facility]    Specimen Information    Type Source Collected On   Blood  04/10/17 0850          Components       Value Reference Range Flag Lab   WBC 7.9 4.0 - 11.0 10e9/L  51   RBC Count 4.42 3.8 - 5.2 10e12/L  51   Hemoglobin 9.2 11.7 - 15.7 g/dL L 51   Hematocrit 32.8 35.0 - 47.0 % L 51   MCV 74 78 - 100 fl L 51   MCH 20.8 26.5 - 33.0 pg L 51   MCHC 28.0 31.5 - 36.5 g/dL L 51   RDW 19.2 10.0 - 15.0 % H 51   Platelet Count 287 150 - 450 10e9/L  51   Diff Method Automated Method   51   % Neutrophils 73.3 %  51   % Lymphocytes 19.2 %  51   % Monocytes 7.1 %  51   % Eosinophils 0.1 %  51   % Basophils 0.0 %  51   % Immature Granulocytes 0.3 %  51   Nucleated RBCs 0 0 /100  51   Absolute Neutrophil 5.8 1.6 - 8.3 10e9/L  51   Absolute Lymphocytes 1.5 0.8 - 5.3 10e9/L  51   Absolute Monocytes 0.6 0.0 - 1.3 10e9/L  51   Absolute Eosinophils 0.0 0.0 - 0.7 10e9/L  51   Absolute Basophils 0.0 0.0 - 0.2 10e9/L  51   Abs Immature Granulocytes 0.0 0 - 0.4 10e9/L  51    Absolute Nucleated RBC 0.0   51            INR [[id number]] (Abnormal)  Resulted: 04/10/17 0929, Result status: Final result    Ordering provider: Mieky Elizabeth MD  04/10/17 0000 Resulting lab: [facility]    Specimen Information    Type Source Collected On   Blood  04/10/17 0850          Components       Value Reference Range Flag Lab   INR 2.47 0.86 - 1.14 H 51            Glucose by meter [[id number]] (Abnormal)  Resulted: 04/10/17 0235, Result status: Final result    Ordering provider: Meghann White MD  04/10/17 0148 Resulting lab: POINT OF CARE TEST, GLUCOSE    Specimen Information    Type Source Collected On     04/10/17 0148          Components       Value Reference Range Flag Lab   Glucose 194 70 - 99 mg/dL H 170            Glucose by meter [[id number]] (Abnormal)  Resulted: 04/09/17 2230, Result status: Final result    Ordering provider: Meghann White MD  04/09/17 2219 Resulting lab: POINT OF CARE TEST, GLUCOSE    Specimen Information    Type Source Collected On     04/09/17 2219          Components       Value Reference Range Flag Lab   Glucose 239 70 - 99 mg/dL H 170            Glucose by meter [[id number]] (Abnormal)  Resulted: 04/09/17 1850, Result status: Final result    Ordering provider: Meghann White MD  04/09/17 1845 Resulting lab: POINT OF CARE TEST, GLUCOSE    Specimen Information    Type Source Collected On     04/09/17 1845          Components       Value Reference Range Flag Lab   Glucose 314 70 - 99 mg/dL H 170            Glucose by meter [[id number]] (Abnormal)  Resulted: 04/09/17 1716, Result status: Final result    Ordering provider: Meghann White MD  04/09/17 1710 Resulting lab: POINT OF CARE TEST, GLUCOSE    Specimen Information    Type Source Collected On     04/09/17 1710          Components       Value Reference Range Flag Lab   Glucose 125 70 - 99 mg/dL H 170            Glucose by meter [[id number]] (Abnormal)  Resulted:  04/09/17 1201, Result status: Final result    Ordering provider: Meghann White MD  04/09/17 1157 Resulting lab: POINT OF CARE TEST, GLUCOSE    Specimen Information    Type Source Collected On     04/09/17 1157          Components       Value Reference Range Flag Lab   Glucose 130 70 - 99 mg/dL H 170            Glucose by meter [[id number]] (Abnormal)  Resulted: 04/09/17 0736, Result status: Final result    Ordering provider: Meghann White MD  04/09/17 0730 Resulting lab: POINT OF CARE TEST, GLUCOSE    Specimen Information    Type Source Collected On     04/09/17 0730          Components       Value Reference Range Flag Lab   Glucose 165 70 - 99 mg/dL H 170            CBC with platelets differential [[id number]] (Abnormal)  Resulted: 04/09/17 0711, Result status: Final result    Ordering provider: Beatriz Painting MD  04/08/17 2200 Resulting lab: [facility]    Specimen Information    Type Source Collected On   Blood  04/09/17 0514          Components       Value Reference Range Flag Lab   WBC 7.6 4.0 - 11.0 10e9/L  51   RBC Count 4.08 3.8 - 5.2 10e12/L  51   Hemoglobin 8.4 11.7 - 15.7 g/dL L 51   Hematocrit 30.5 35.0 - 47.0 % L 51   MCV 75 78 - 100 fl L 51   MCH 20.6 26.5 - 33.0 pg L 51   MCHC 27.5 31.5 - 36.5 g/dL L 51   RDW 19.2 10.0 - 15.0 % H 51   Platelet Count 233 150 - 450 10e9/L  51   Diff Method Automated Method   51   % Neutrophils 74.9 %  51   % Lymphocytes 15.8 %  51   % Monocytes 8.8 %  51   % Eosinophils 0.0 %  51   % Basophils 0.1 %  51   % Immature Granulocytes 0.4 %  51   Nucleated RBCs 0 0 /100  51   Absolute Neutrophil 5.7 1.6 - 8.3 10e9/L  51   Absolute Lymphocytes 1.2 0.8 - 5.3 10e9/L  51   Absolute Monocytes 0.7 0.0 - 1.3 10e9/L  51   Absolute Eosinophils 0.0 0.0 - 0.7 10e9/L  51   Absolute Basophils 0.0 0.0 - 0.2 10e9/L  51   Abs Immature Granulocytes 0.0 0 - 0.4 10e9/L  51   Absolute Nucleated RBC 0.0   51   Platelet Estimate Confirming  automated cell count   51            Basic metabolic panel [[id number]] (Abnormal)  Resulted: 04/09/17 0703, Result status: Final result    Ordering provider: Beatriz Painting MD  04/08/17 2200 Resulting lab: [facility]    Specimen Information    Type Source Collected On   Blood  04/09/17 0514          Components       Value Reference Range Flag Lab   Sodium 142 133 - 144 mmol/L  51   Potassium 3.5 3.4 - 5.3 mmol/L  51   Chloride 104 94 - 109 mmol/L  51   Carbon Dioxide 26 20 - 32 mmol/L  51   Anion Gap 12 3 - 14 mmol/L  51   Glucose 205 70 - 99 mg/dL H 51   Urea Nitrogen 15 7 - 30 mg/dL  51   Creatinine 0.68 0.52 - 1.04 mg/dL  51   GFR Estimate 84 >60 mL/min/1.7m2  51   Comment:  Non  GFR Calc   GFR Estimate If Black -- >60 mL/min/1.7m2  51   Result:         >90   GFR Calc     Calcium 8.4 8.5 - 10.1 mg/dL L 51   Result:              INR [[id number]] (Abnormal)  Resulted: 04/09/17 0649, Result status: Final result    Ordering provider: Beatriz Painting MD  04/08/17 2200 Resulting lab: [facility]    Specimen Information    Type Source Collected On   Blood  04/09/17 0514          Components       Value Reference Range Flag Lab   INR 3.76 0.86 - 1.14 H 51            Glucose by meter [[id number]] (Abnormal)  Resulted: 04/09/17 0600, Result status: Final result    Ordering provider: Meghann White MD  04/09/17 0555 Resulting lab: POINT OF CARE TEST, GLUCOSE    Specimen Information    Type Source Collected On     04/09/17 0555          Components       Value Reference Range Flag Lab   Glucose 199 70 - 99 mg/dL H 170            Glucose by meter [[id number]] (Abnormal)  Resulted: 04/09/17 0151, Result status: Final result    Ordering provider: Meghann White MD  04/09/17 0146 Resulting lab: POINT OF CARE TEST, GLUCOSE    Specimen Information    Type Source Collected On     04/09/17 0146          Components        Value Reference Range Flag Lab   Glucose 280 70 - 99 mg/dL H 170            Glucose by meter [[id number]] (Abnormal)  Resulted: 04/08/17 2141, Result status: Final result    Ordering provider: Meghann White MD  04/08/17 2135 Resulting lab: POINT OF CARE TEST, GLUCOSE    Specimen Information    Type Source Collected On     04/08/17 2135          Components       Value Reference Range Flag Lab   Glucose 444 70 - 99 mg/dL H 170            Glucose by meter [[id number]] (Abnormal)  Resulted: 04/08/17 1711, Result status: Final result    Ordering provider: Meghann White MD  04/08/17 1706 Resulting lab: POINT OF CARE TEST, GLUCOSE    Specimen Information    Type Source Collected On     04/08/17 1706          Components       Value Reference Range Flag Lab   Glucose 348 70 - 99 mg/dL H 170            Glucose by meter [[id number]] (Abnormal)  Resulted: 04/08/17 1226, Result status: Final result    Ordering provider: Meghann White MD  04/08/17 1222 Resulting lab: POINT OF CARE TEST, GLUCOSE    Specimen Information    Type Source Collected On     04/08/17 1222          Components       Value Reference Range Flag Lab   Glucose 320 70 - 99 mg/dL H 170            Glucose by meter [[id number]] (Abnormal)  Resulted: 04/08/17 0940, Result status: Final result    Ordering provider: Meghann White MD  04/08/17 0935 Resulting lab: POINT OF CARE TEST, GLUCOSE    Specimen Information    Type Source Collected On     04/08/17 0935          Components       Value Reference Range Flag Lab   Glucose 119 70 - 99 mg/dL H 170            Blood culture [[id number]]  Resulted: 04/08/17 0707, Result status: Final result    Ordering provider: Sole Ibarra MD  04/02/17 2159 Resulting lab: [facility]    Specimen Information    Type Source Collected On   Blood Arm, Left 04/02/17 2147          Components       Value Reference Range Flag Lab   Specimen Description Blood Left Hand   75   Culture  Micro No growth   225   Micro Report Status FINAL 04/08/2017   225            Blood culture [[id number]]  Resulted: 04/08/17 0707, Result status: Final result    Ordering provider: Sole Ibarra MD  04/02/17 2159 Resulting lab: [facility]    Specimen Information    Type Source Collected On   Blood Arm, Right 04/02/17 2211          Components       Value Reference Range Flag Lab   Specimen Description Blood Right Arm   75   Culture Micro No growth   225   Micro Report Status FINAL 04/08/2017   225            Glucose by meter [[id number]] (Abnormal)  Resulted: 04/08/17 0600, Result status: Final result    Ordering provider: Meghann White MD  04/08/17 0555 Resulting lab: POINT OF CARE TEST, GLUCOSE    Specimen Information    Type Source Collected On     04/08/17 0555          Components       Value Reference Range Flag Lab   Glucose 145 70 - 99 mg/dL H 170            Procalcitonin [[id number]]  Resulted: 04/08/17 0553, Result status: Final result    Ordering provider: Beatriz Painting MD  04/07/17 2200 Resulting lab: [facility]    Specimen Information    Type Source Collected On   Blood  04/08/17 0345          Components       Value Reference Range Flag Lab   Procalcitonin 0.05 ng/ml  51   Comment:         0.05-0.24 ng/ml Low risk of systemic bacterial infection. Local bacterial   infection possible.  Recommendation: Assess other clinical features of   infection. Discourage antibiotics unless strong clinical suspicion for   serious   infection.              INR [[id number]] (Abnormal)  Resulted: 04/08/17 0510, Result status: Final result    Ordering provider: Beatriz Painting MD  04/07/17 2200 Resulting lab: [facility]    Specimen Information    Type Source Collected On   Blood  04/08/17 0345          Components       Value Reference Range Flag Lab   INR 3.09 0.86 - 1.14 H 51            Magnesium  [[id number]]  Resulted: 04/08/17 0452, Result status: Final result    Ordering provider: Beatriz Painting MD  04/07/17 2200 Resulting lab: [facility]    Specimen Information    Type Source Collected On   Blood  04/08/17 0345          Components       Value Reference Range Flag Lab   Magnesium 2.3 1.6 - 2.3 mg/dL  51            Basic metabolic panel [[id number]] (Abnormal)  Resulted: 04/08/17 0452, Result status: Final result    Ordering provider: Beatriz Painting MD  04/07/17 2200 Resulting lab: [facility]    Specimen Information    Type Source Collected On   Blood  04/08/17 0345          Components       Value Reference Range Flag Lab   Sodium 143 133 - 144 mmol/L  51   Potassium 4.0 3.4 - 5.3 mmol/L  51   Chloride 107 94 - 109 mmol/L  51   Carbon Dioxide 24 20 - 32 mmol/L  51   Anion Gap 12 3 - 14 mmol/L  51   Glucose 138 70 - 99 mg/dL H 51   Urea Nitrogen 14 7 - 30 mg/dL  51   Creatinine 0.55 0.52 - 1.04 mg/dL  51   GFR Estimate -- >60 mL/min/1.7m2  51   Result:         >90  Non  GFR Calc     GFR Estimate If Black -- >60 mL/min/1.7m2  51   Result:         >90   GFR Calc     Calcium 8.1 8.5 - 10.1 mg/dL L 51   Result:              CBC with platelets differential [[id number]] (Abnormal)  Resulted: 04/08/17 0448, Result status: Final result    Ordering provider: Beatriz Painting MD  04/07/17 2200 Resulting lab: [facility]    Specimen Information    Type Source Collected On   Blood  04/08/17 0345          Components       Value Reference Range Flag Lab   WBC 6.8 4.0 - 11.0 10e9/L  51   RBC Count 3.88 3.8 - 5.2 10e12/L  51   Hemoglobin 8.1 11.7 - 15.7 g/dL L 51   Hematocrit 28.0 35.0 - 47.0 % L 51   MCV 72 78 - 100 fl L 51   MCH 20.9 26.5 - 33.0 pg L 51   MCHC 28.9 31.5 - 36.5 g/dL L 51   RDW 19.0 10.0 - 15.0 % H 51   Platelet Count 116 150 - 450 10e9/L L 51   Diff Method  Automated Method   51   % Neutrophils 78.2 %  51   % Lymphocytes 14.4 %  51   % Monocytes 6.5 %  51   % Eosinophils 0.0 %  51   % Basophils 0.3 %  51   % Immature Granulocytes 0.6 %  51   Nucleated RBCs 0 0 /100  51   Absolute Neutrophil 5.3 1.6 - 8.3 10e9/L  51   Absolute Lymphocytes 1.0 0.8 - 5.3 10e9/L  51   Absolute Monocytes 0.4 0.0 - 1.3 10e9/L  51   Absolute Eosinophils 0.0 0.0 - 0.7 10e9/L  51   Absolute Basophils 0.0 0.0 - 0.2 10e9/L  51   Abs Immature Granulocytes 0.0 0 - 0.4 10e9/L  51   Absolute Nucleated RBC 0.0   51            Glucose by meter [[id number]] (Abnormal)  Resulted: 04/08/17 0420, Result status: Final result    Ordering provider: Meghann White MD  04/08/17 0415 Resulting lab: POINT OF CARE TEST, GLUCOSE    Specimen Information    Type Source Collected On     04/08/17 0415          Components       Value Reference Range Flag Lab   Glucose 150 70 - 99 mg/dL H 170            Testing Performed By     Lab - Abbreviation Name Director Address Valid Date Range    51 - Unknown [facility] Unknown [address] [facility] [address] 12/31/14 1010 - Present    75 - Unknown [facility] Unknown [address] [facility] [address] 01/15/15 1019 - Present    170 - Unknown POINT OF CARE TEST, GLUCOSE Unknown Unknown 10/31/11 1114 - Present    225 - Unknown [facility] Unknown [address] [facility] [address] 12/19/14 0954 - Present    226 - Unknown MICRO RAPID TESTING LAB Unknown [address] [facility] [address] 12/19/14 0955 - Present               Imaging Results - 3 Days      XR Chest Port 1 View [[id number]]  Resulted: 04/08/17 1119, Result status: Final result    Ordering provider: Coty Moreno MD  04/08/17 0826 Resulted by: Augustine Ghotra MD Craig, Paul Grant, MD    Performed: 04/08/17 0850 - 04/08/17 0907 Resulting lab: RADIOLOGY RESULTS    Narrative:       EXAM: XR  CHEST PORT 1 VW  2017 9:07 AM      HISTORY: reevaluation of pulmonary edema    COMPARISON: 2017    FINDINGS: Cardiac silhouette remains obscured . Worsening perihilar  opacities, left greater than right. Small left pleural effusion. No  pneumothorax.      Impression:       IMPRESSION: Worsening perihilar opacities, left greater right.  Findings likely indicate worsening pulmonary edema.    I have personally reviewed the examination and initial interpretation  and I agree with the findings.    HOMAR SHIELDS MD      Testing Performed By     Lab - Abbreviation Name Director Address Valid Date Range    104 - Rad Rslts RADIOLOGY RESULTS Unknown Unknown 05 1553 - Present               ECG/EMG Results      ECHO COMPLETE WITH OPTISON [[id number]]  Resulted: 17 0958, Result status: Edited Result - FINAL    Ordering provider: Coty Siegel MD  17 0845 Resulted by: Ramin Raymond MD    Performed: 17 1025 - 17 1032 Resulting lab: RADIOLOGY RESULTS    Narrative:       [id number]  [id number]  [id number]  [id number]^ROBBIN^COTY^           [facility]  Echocardiography Laboratory  [address] [place], [place] [address]     Name: JEAN KENNEDY  MRN: [id number]  : 1940  Study Date: 2017 09:58 AM  Age: 76 yrs  Gender: Female  Patient Location: [facility]  Reason For Study: Afib  Ordering Physician: COTY SIEGEL  Performed By: PRADEEP Nails     BSA: 2.0 m2  Height: 66 in  Weight: 206 lb  BP: 127/73 mmHg  _____________________________________________________________________________  __        Procedure  Echocardiogram with two-dimensional, color and spectral Doppler performed.  Contrast Optison. Optison (NDC #8164-7701-27) given intravenously. Patient was  given 6.0 ml mixture of 3 ml Optison and 6 ml saline. 3.0 ml wasted. The  patient's rhythm is atrial  fibrillation.  _____________________________________________________________________________  __        Interpretation Summary  Left ventricular function is normal. The visually estimated EF is 60-65%.  There is basal to mid anteroseptal akinesis.  Right ventricular function, chamber size, wall motion, and thickness are  normal.  No significant valvular abnormalities.  The inferior vena cava was normal in size with preserved respiratory  variability.  This study was compared with the study from 7/3/2016 . The basal to mid  anteroseptal akinesis is new.  _____________________________________________________________________________  __        Left Ventricle  Left ventricular function is normal.The EF is 60-65%. Left ventricular size is  normal. Mild concentric wall thickening consistent with left ventricular  hypertrophy is present. Diastolic function not assessed due to atrial  fibrillation. Basal to mid anteroseptal akinesis.     Right Ventricle  Right ventricular function, chamber size, wall motion, and thickness are  normal. TAPSE 1.7cm. S' velocity 9cm/s.     Atria  Both atria appear normal.        Mitral Valve  Trace to mild mitral insufficiency is present.     Aortic Valve  Mild aortic valve sclerosis is present.     Tricuspid Valve  Trace tricuspid insufficiency is present. The peak velocity of the tricuspid  regurgitant jet is not obtainable. Pulmonary artery systolic pressure cannot  be assessed.     Pulmonic Valve  The pulmonic valve is normal.     Vessels  The aorta root is normal. The thoracic aorta is normal. The inferior vena cava  was normal in size with preserved respiratory variability. Estimated right  atrial pressure is < 5 mmHg.     Pericardium  Trivial pericardial effusion is present.        Compared to Previous Study  This study was compared with the study from 7/3/2016 . The basal to mid  anteroseptal akinesis is new.     Attestation  I have personally viewed the imaging and agree with the  interpretation and  report as documented by the fellow, Dr. Ferguson, and/or edited by me.  _____________________________________________________________________________  __     MMode/2D Measurements & Calculations  RVDd: 3.6 cm  IVSd: 1.1 cm  LVIDd: 4.8 cm  LVIDs: 3.1 cm  LVPWd: 1.2 cm  FS: 36.4 %  EDV(Teich): 108.5 ml  ESV(Teich): 36.9 ml  LV mass(C)d: 207.3 grams  Ao root diam: 3.2 cm  asc Aorta Diam: 3.4 cm  LA/Ao: 1.3  LVOT diam: 1.9 cm  LVOT area: 2.9 cm2  LA Volume (BP): 69.8 ml  TAPSE: 1.7 cm        Doppler Measurements & Calculations  MV E max neeta: 116.1 cm/sec              _____________________________________________________________________________  __           Report approved by: Jd Daugherty 04/06/2017 12:23 PM       1    Type Source Collected On     04/06/17 0958          View Image (below)        Echocardiogram Complete [[id number]]  Resulted: 04/06/17 1026, Result status: In process    Ordering provider: Coty Moreno MD  04/06/17 0845 Performed: 04/06/17 1025 - 04/06/17 1025    Resulting lab: [facility]                   Encounter-Level Documents:     There are no encounter-level documents.      Order-Level Documents:     There are no order-level documents.

## 2017-04-02 NOTE — IP AVS SNAPSHOT
"` `           [facility]: [phone]                                              INTERAGENCY TRANSFER FORM - NURSING   2017                    Hospital Admission Date: 2017  JEAN KENNEDY   : 1940  Sex: Female        Attending Provider: Lenny Greenwood MD     Allergies:  Augmentin, Codeine, Phenobarbital    Infection:  None   Service:  FAMILY MEDIC    Ht:  1.695 m (5' 6.73\")   Wt:  93.4 kg (205 lb 14.4 oz)   Admission Wt:  92.8 kg (204 lb 8 oz)    BMI:  32.51 kg/m 2   BSA:  2.1 m 2            Patient PCP Information     Provider PCP Type    Ilene Tristan MD General      Current Code Status     Date Active Code Status Order ID Comments User Context       4/3/2017  3:53 AM Full Code [id number]  Mikey Elizabeth MD Inpatient       Code Status History     Date Active Date Inactive Code Status Order ID Comments User Context    2016 11:28 AM 4/3/2017  3:53 AM DNR/DNI [id number]  William Kwan MD Outpatient    2016  2:36 PM 2016 11:28 AM DNR/DNI [id number]  William Kwan MD Inpatient    2016  6:05 PM 2016  4:06 PM Full Code [id number]  Manny Case MD Inpatient    2012  1:58 PM 2016  6:05 PM Full Code [id number]  Raj Unger MD Outpatient    2012  5:24 PM 2012  1:58 PM Full Code [id number]  Elizabeth Benson RN Inpatient    2011  2:54 PM 2012  5:24 PM Full Code [id number]  Mya Fritz Outpatient    2011 10:08 PM 2011  2:54 PM Full Code [id number]  Monalisa Cuba, JASON Inpatient    2011  3:15 PM 2011 10:08 PM DNR [id number] DNR form reviewed and documented.  11 PADDY Gregory Patty Outpatient    10/7/2011  9:37 AM 10/12/2011  4:07 PM Full Code [id number]  Edda Banad, RN Inpatient    2003  5:31 PM 2003  5:31 PM  None  Jasmina Mejia Demographics      Advance Directives        Does patient have a scanned Advance Directive/ACP document in EPIC?    "        Yes        Hospital Problems as of 4/11/2017              [facility] Class Noted POA    CAP (community acquired pneumonia) Medium  4/3/2017 Yes    HCAP (healthcare-associated pneumonia) Medium  4/8/2017 No    Influenza B Medium  4/8/2017 Yes    Heart failure, chronic, with acute decompensation (H) Medium  4/8/2017 No    Type 2 diabetes mellitus with hyperglycemia (H) Medium  4/8/2017 Yes      Non-Hospital Problems as of 4/11/2017              [facility] Class Noted    Temporomandibular joint disorder Low  11/24/2003    Diverticulitis of colon High  9/24/2004    Disorder of bone and cartilage Low  9/24/2004    Osteoarthritis Low  9/24/2004    Alcohol abuse, in remission Low  9/24/2004    Restless legs syndrome (RLS) Medium  9/6/2007    Major depressive disorder, recurrent episode, moderate Medium  Unknown    Essential hypertension with goal blood pressure less than 140/90 Medium  10/19/2010    CARDIOVASCULAR SCREENING; LDL GOAL LESS THAN 130 Low  10/31/2010    Sciatica Medium  11/3/2010    Advanced directives, counseling/discussion Low  11/1/2011    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    Atrial fibrillation with controlled ventricular response (H) High  11/4/2011    Left ventricular hypertrophy Medium  11/4/2011    Health Care Home Low  2/9/2012    Insomnia   5/26/2012    Joint pains   1/8/2013    Allergic reaction caused by a drug - likely plaquenil   4/30/2013    Breast fibroadenoma   8/18/2014    DVT (deep venous thrombosis) (H)   9/15/2014    Fatty liver disease, nonalcoholic Medium  9/24/2014    Rib pain   10/31/2014    Neck mass   4/28/2015    Gastroesophageal reflux disease without esophagitis Medium  7/23/2015    Enlarged lymph node Medium  8/4/2015    Sjogren's syndrome (H) Medium  10/30/2015    ANGELICA (obstructive sleep apnea)   4/12/2016    ILD (interstitial lung disease) (H) Medium  6/5/2016    Lower GI bleed Medium  6/26/2016    Acute and chronic respiratory failure with hypoxia (H) Medium  7/2/2016     Acute edema of lung (H) Medium  7/4/2016    (HFpEF) heart failure with preserved ejection fraction (H)   8/27/2016    Type 2 diabetes mellitus without complication, without long-term current use of insulin (H) Medium  10/24/2016      Immunizations     Name Date      HERPES ZOSTER 10/05/09     Influenza (High Dose) 3 valent vaccine 11/11/16     Influenza (High Dose) 3 valent vaccine 12/31/15     Influenza (High Dose) 3 valent vaccine 10/14/14     Influenza (High Dose) 3 valent vaccine 10/11/13     Influenza (IIV3) 09/21/12     Influenza (IIV3) 10/17/11     Influenza (IIV3) 09/01/10     Influenza (IIV3) 11/01/06     Influenza (IIV3) 10/28/05     Influenza (IIV3) 10/08/04     Influenza (IIV3) 11/24/03     Pneumococcal (PCV 13) 10/30/15     Pneumococcal 23 valent 11/03/10     TD (ADULT, 7+) 11/05/02     TD (ADULT, 7+) 01/15/93     TDAP Vaccine (Adacel) 11/03/10          END      ASSESSMENT     Discharge Profile Flowsheet     EXPECTED DISCHARGE     Patient's communication style  spoken language (English or Bilingual) 04/03/17 0358    Expected Discharge Date  04/12/17 (TCU) 04/10/17 1048   FINAL RESOURCES      DISCHARGE NEEDS ASSESSMENT     Resources List  DME 04/03/17 0823    Equipment Currently Used at Home  oxygen;other (see comments) (has 4WW but does not use) 04/08/17 1403   DME  [facility] [phone], Fax: [phone] 04/05/17 0830    Transportation Available  car;family or friend will provide 04/08/17 1403   DME Equipment Ordered  respiratory supplies 04/03/17 0823    Equipment Used at Home  colostomy/ostomy supplies 11/15/11 1409   SKIN      FUNCTIONAL LEVEL CURRENT     Inspection  Partial (identify areas NOT inspected) 04/11/17 1121    Change in Functional Status Since Onset of Current Illness/Injury  no 06/26/16 1727   Skin WDL  ex 04/11/17 1121    GASTROINTESTINAL (ADULT,PEDIATRIC,OB)     Skin areas NOT inspected  Coccyx;Sacrum;Buttock, right;Buttock, left;Hip, right;Hip, left 04/11/17 1121    GI WDL   "WDL 04/11/17 1121   Skin Color/Characteristics  bruised (ecchymotic) 04/11/17 1121    Abdominal Appearance  rounded 04/09/17 0517   SAFETY      Last Bowel Movement  04/10/17 04/11/17 1121   Safety WDL  WDL 04/11/17 1121    COMMUNICATION ASSESSMENT     Safety Equipment  oxygen flowmeter;manual resusciator with appropriate mask 04/09/17 0517                 Assessment WDL (Within Defined Limits) Definitions           Safety WDL     Effective: 09/28/15    Row Information: <b>WDL Definition:</b> Bed in low position, wheels locked; call light in reach; upper side rails up x 2; ID band on<br> <font color=\"gray\"><i>Item=AS safety wdl>>List=AS safety wdl>>Version=F14</i></font>      Skin WDL     Effective: 09/28/15    Row Information: <b>WDL Definition:</b> Warm; dry; intact; elastic; without discoloration; pressure points without redness<br> <font color=\"gray\"><i>Item=AS skin wdl>>List=AS skin wdl>>Version=F14</i></font>      Vitals     Vital Signs Flowsheet     VITAL SIGNS     VIVIANA COMA SCALE      Temp  98.5  F (36.9  C) 04/11/17 1406   Best Eye Response  4-->(E4) spontaneous 04/03/17 0419    Temp src  Oral 04/11/17 1406   Best Motor Response  6-->(M6) obeys commands 04/03/17 0419    Resp  16 04/11/17 1406   Best Verbal Response  5-->(V5) oriented 04/03/17 0419    Pulse  82 04/11/17 0634   Viviana Coma Scale Score  15 04/03/17 0419    Heart Rate  86 04/11/17 1406   EKG MONITORING      Pulse/Heart Rate Source  Monitor 04/11/17 1406   Cardiac Regularity  Irregular 04/03/17 0355    BP  127/74 04/11/17 1406   Cardiac Rhythm  Atrial fibrillation 04/03/17 1534    BP Location  Right arm 04/11/17 1406   POSITIONING      OXYGEN THERAPY     Body Position  independently positioning 04/11/17 1125    SpO2  90 % 04/11/17 1406   Head of Bed (HOB)  HOB at 20-30 degrees 04/11/17 1125    O2 Device  None (Room air) 04/11/17 1406   Chair  Upright in chair 04/11/17 1125    FiO2 (%)  4 % 04/07/17 2036   Positioning/Transfer Devices  " pillows;in use 04/09/17 0510    Oxygen Delivery  1 LPM 04/09/17 0510   DAILY CARE      PAIN/COMFORT     Activity Type  activity encouraged;ambulated in room;ambulated to bathroom 04/11/17 1125    Patient Currently in Pain  denies 04/11/17 0858   Activity Level of Assistance  independent;assistance, stand-by 04/11/17 1125    Preferred Pain Scale  CAPA (Clinically Aligned Pain Assessment) ([facility] Adults Only) 04/11/17 0452   ECG      Pain Location  -- (with coughing) 04/04/17 1553   ECG Rhythm  Atrial fibrillation 04/09/17 1311    CLINICALLY ALIGNED PAIN ASSESSMENT (CAPA) ([facility] ADULTS ONLY)     Ectopy  PAC 04/09/17 1311    Comfort  negligible pain 04/11/17 0137   Lead Monitored  Lead II;V 1 04/09/17 1311    Change in Pain  getting better 04/08/17 0333   Ectopy Frequency  Rare 04/09/17 1311    Pain Control  fully effective 04/08/17 0333   Equipment  electrodes changed 04/09/17 0041    Functioning  can do everything I need to 04/08/17 0333   POINT OF CARE TESTING      Sleep  normal sleep 04/08/17 0333   Puncture Site  fingertip 04/09/17 0556    HEIGHT AND WEIGHT     Bedside Glucose (mg/dl )   199 mg/dl 04/09/17 0556    Weight  93.4 kg (205 lb 14.4 oz) (standing) 04/11/17 1057                 Patient Lines/Drains/Airways Status    Active LINES/DRAINS/AIRWAYS     Name: Placement date: Placement time: Site: Days: Last dressing change:    Peripheral IV 04/06/17 Right;Lateral Upper forearm;Lower forearm 04/06/17   1048   Upper forearm;Lower forearm   5             Patient Lines/Drains/Airways Status    Active PICC/CVC     None            Intake/Output Detail Report     Date Intake     Output   Net    Shift P.O. I.V. IV Piggyback Total Urine Stool Total       Heaven 04/10/17 0700 - 04/10/17 1459 360 3 -- 363 -- -- -- 363    Noc 04/10/17 1500 - 04/10/17 2359 570 250 -- 820 -- -- -- 820    Day 04/11/17 0000 - 04/11/17 0659 240 -- -- 240 -- -- -- 240    Heaven 04/11/17 0700 - 04/11/17 1457  240 -- -- 240 600 -- 600 -360    Noc 04/11/17 1500 - 04/11/17 2359 -- -- -- -- -- -- -- 0      Last Void/BM       Most Recent Value    Urine Occurrence 2 at 04/11/2017 0452    Stool Occurrence 1 at 04/09/2017 1100      Case Management/Discharge Planning     Case Management/Discharge Planning Flowsheet     REFERRAL INFORMATION     FINAL RESOURCES      Arrived From  home or self-care 04/03/17 0358   Equipment Currently Used at Home  oxygen;other (see comments) (has 4WW but does not use) 04/08/17 1403    LIVING ENVIRONMENT     Resources List  DME 04/03/17 0823    Lives With  friend(s) 04/08/17 1403   DME  [facility] [phone], Fax: [phone] 04/05/17 0830    Living Arrangements  house 04/08/17 1403   DME Equipment Ordered  respiratory supplies 04/03/17 0823    Provides Primary Care For  no one 06/26/16 1731   / CAREGIVER      COPING/STRESS     Filed Complexity Screen Score  9 04/05/17 0817    Major Change/Loss/Stressor  none 04/03/17 0412   ABUSE RISK SCREEN      EXPECTED DISCHARGE     QUESTION TO PATIENT:  Has a member of your family or a partner(now or in the past) intimidated, hurt, manipulated, or controlled you in any way?  no 04/02/17 2148    Expected Discharge Date  04/12/17 (TCU) 04/10/17 1048   QUESTION TO PATIENT: Do you feel safe going back to the place where you are living?  yes 04/02/17 2148    DISCHARGE PLANNING     OBSERVATION: Is there reason to believe there has been maltreatment of a vulnerable adult (ie. Physical/Sexual/Emotional abuse, self neglect, lack of adequate food, shelter, medical care, or financial exploitation)?  no 04/02/17 2148    Transportation Available  car;family or friend will provide 04/08/17 1403   (R) MENTAL HEALTH SUICIDE RISK      Outpatient/Agency/Support Group Needs  homecare agency 11/15/11 1409   Are you depressed or being treated for depression?  No 04/03/17 0400    Community Agency Name(S)  -- ([facility] 02/03/12 1559   HOMICIDE RISK       Equipment Used at Home  colostomy/ostomy supplies 11/15/11 1405   Homicidal Ideation  no 04/02/17 5969    Additional Equipment Needed  -- (WOC RN will provide) 11/15/11 5983

## 2017-04-02 NOTE — IP AVS SNAPSHOT
` `     [address] [facility] BANK: [phone]            Medication Administration Report for Betty Tee as of 04/11/17 1548   Legend:    Given Hold Not Given Due Canceled Entry Other Actions    Time Time (Time) Time  Time-Action       Inactive    Active    Linked        Medications 04/05/17 04/06/17 04/07/17 04/08/17 04/09/17 04/10/17 04/11/17    acetaminophen (TYLENOL) tablet 1,000 mg  Dose: 1,000 mg Freq: EVERY 8 HOURS Route: PO  Start: 04/04/17 0945   Admin Instructions: Maximum acetaminophen dose from all sources = 75 mg/kg/day not to exceed 4 gram     0141 (1,000 mg)-Given       1037 (975 mg)-Given       1808 (1,000 mg)-Given        0039 (1,000 mg)-Given              (1329)-Not Given       (1844)-Not Given [C]        0211 (1,000 mg)-Given       (0934)-Not Given       (1809)-Not Given        (0059)-Not Given       (0908)-Not Given       1500 (500 mg)-Given [C]              (2353)-Not Given        (0832)-Not Given       (1616)-Not Given        (0001)-Not Given       (0903)-Not Given       (1705)-Not Given        (0053)-Not Given       (0842)-Not Given       (1546)-Not Given          Or  acetaminophen (TYLENOL) Suppository 650 mg  Dose: 650 mg Freq: EVERY 8 HOURS Route: RE  Start: 04/04/17 0945   Admin Instructions: Maximum acetaminophen dose from all sources = 75 mg/kg/day not to exceed 4 grams/day.                                                                                                                                                                                acetylcysteine (MUCOMYST) 20 % nebulizer solution 2 mL  Dose: 2 mL Freq: 2 TIMES DAILY Route: NEBULIZATION  Start: 04/10/17 0800   Admin Instructions: Nebulization tubing with a FILTER is recommended with acetylcysteine nebs.          0818 (2 mL)-Given       1944 (2 mL)-Given        0916 (2 mL)-Given       [ ] 2000           albuterol neb solution 2.5 mg  Dose: 3 mL Freq: EVERY 2 HOURS PRN Route: NEBULIZATION  PRN Reasons:  wheezing,shortness of breath / dyspnea  Start: 04/03/17 0353     0043 (2.5 mg)-Given       0544 (2.5 mg)-Given       1652 (2.5 mg)-Given                atorvastatin (LIPITOR) tablet 40 mg  Dose: 40 mg Freq: DAILY Route: PO  Start: 04/09/17 1030        1445 (40 mg)-Given        0905 (40 mg)-Given        0842 (40 mg)-Given           dextrose 10 % 1,000 mL infusion  Freq: CONTINUOUS PRN Route: IV  PRN Comment: Give if on IV Insulin Infusion, and Parenteral or Enteral nutrition held or cycled off.   Start: 04/07/17 1711   Admin Instructions: Infuse IV D10W at TPN/TF rate whenever nutrition is held or cycled off.               fluticasone (FLONASE) 50 MCG/ACT spray 1-2 spray  Dose: 1-2 spray Freq: DAILY PRN Route: BOTH NOSTRIL  PRN Reason: allergies  Start: 04/03/17 0353              fluticasone furoate (ARNUITY ELLIPTA) 200 MCG/ACT inhalation powder 1 puff  Dose: 1 puff Freq: DAILY Route: IN  Start: 04/03/17 0800   Admin Instructions: Rinse mouth after use.<br><br>**Formulary alternate for Flovent  - 2 puffs bid <br><br>     0850 (1 puff)-Given        (1000)-Not Given        0920 (1 puff)-Given        (1158)-Not Given        0833 (1 puff)-Given        0906 (1 puff)-Given        0841 (1 puff)-Given           furosemide (LASIX) tablet 40 mg  Dose: 40 mg Freq: 2 TIMES DAILY. Route: PO  Start: 04/08/17 0800       0925 (40 mg)-Given       1655 (40 mg)-Given        0833 (40 mg)-Given       1600-Hold [C]        0905 (40 mg)-Given       1704 (40 mg)-Given        0843 (40 mg)-Given       1546 (40 mg)-Given           glucose 40 % gel 15-30 g  Dose: 15-30 g Freq: EVERY 15 MIN PRN Route: PO  PRN Reason: low blood sugar  Start: 04/07/17 1709   Admin Instructions: Give 15 g for BG 51 to 69 mg/dL IF patient is conscious and able to swallow. Give 30 g for BG less than or equal to 50 mg/dL IF patient is conscious and able to swallow. Do NOT give glucose gel via enteral tube.  IF patient has enteral tube: give apple juice 120 mL  (4 oz or 15 g of CHO) via enteral tube for BG 51 to 69 mg/dL.  Give apple juice 240 mL (8 oz or 30 g of CHO) via enteral tube for BG less than or equal to 50 mg/dL.              Or  dextrose 50 % injection 25-50 mL  Dose: 25-50 mL Freq: EVERY 15 MIN PRN Route: IV  PRN Reason: low blood sugar  Start: 04/07/17 1709   Admin Instructions: Use if have IV access, BG less than 70 mg/dL and meet dose criteria below:  Dose if conscious and alert (or disorientated) and NPO = 25 mL  Dose if unconscious / not alert = 50 mL  Vesicant.              Or  glucagon injection 1 mg  Dose: 1 mg Freq: EVERY 15 MIN PRN Route: SC  PRN Reason: low blood sugar  PRN Comment: May repeat x 1 only  Start: 04/07/17 1709   Admin Instructions: May give SQ or IM. IF BG less than or equal to 50 mg/dL and no IV access.  ONLY use glucagon IF patient has NO IV access AND is UNABLE to swallow.               guaiFENesin-dextromethorphan (ROBITUSSIN DM) 100-10 MG/5ML syrup 10 mL  Dose: 10 mL Freq: EVERY 4 HOURS PRN Route: PO  PRN Reason: cough  Start: 04/03/17 0353    0831 (10 mL)-Given       1309 (10 mL)-Given        0108 (10 mL)-Given       0448 (10 mL)-Given        0900 (10 mL)-Given       2305 (10 mL)-Given        2137 (10 mL)-Given        0333 (10 mL)-Given        1212 (10 mL)-Given            insulin aspart (NovoLOG) inj (RAPID ACTING)  Dose: 10 Units Freq: 3 TIMES DAILY WITH MEALS Route: SC  Start: 04/11/17 1200   Admin Instructions: If given at mealtime, must be administered 5 min before meal or immediately after.           1258 (10 Units)-Given       [ ] 1800           insulin aspart (NovoLOG) inj (RAPID ACTING)  Freq: WITH SNACKS OR SUPPLEMENTS Route: SC  PRN Reason: high blood sugar  Start: 04/08/17 0936   Admin Instructions: DOSE:  1  units per 13 grams of carbohydrate. Only chart total amount of units given.  Do not give if pre-prandial glucose is less than 60 mg/dL.  If given at mealtime, must be administered 5 min before meal or immediately  after.               insulin glargine (LANTUS) injection 23 Units  Dose: 23 Units Freq: EVERY MORNING BEFORE BREAKFAST Route: SC  Start: 04/10/17 0730         0851 (23 Units)-Given        0829 (23 Units)-Given           ipratropium - albuterol 0.5 mg/2.5 mg/3 mL (DUONEB) neb solution 3 mL  Dose: 3 mL Freq: 2 TIMES DAILY Route: NEBULIZATION  Start: 04/10/17 0800         0818 (3 mL)-Given       1944 (3 mL)-Given        0916 (3 mL)-Given       [ ] 2000           ketoconazole (NIZORAL) 2 % cream  Freq: 2 TIMES DAILY Route: Top  Start: 04/03/17 0800   Admin Instructions: Apply to genital area     (1454)-Not Given       (1944)-Not Given        (1413)-Not Given       2117 ( )-Given        0921 ( )-Given       (2006)-Not Given        0933 ( )-Given       (2005)-Not Given        (0855)-Not Given       (2126)-Not Given        (0903)-Not Given       2035 ( )-Given        (0844)-Not Given       [ ] 2000           lactobacillus rhamnosus (GG) (CULTURELL) capsule 1 capsule  Dose: 1 capsule Freq: 3 TIMES DAILY BEFORE MEALS Route: PO  Start: 04/04/17 1200   Admin Instructions: Therapeutic interchange for lactobacillus/acidophilus.<br>  Administer at least 2 hours before or after oral antibiotics. Capsules may be opened.     0838 (1 capsule)-Given       1301 (1 capsule)-Given       1808 (1 capsule)-Given        (1000)-Not Given       (1330)-Not Given       (1845)-Not Given        0856 (1 capsule)-Given       1519 (1 capsule)-Given       1808 (1 capsule)-Given        0926 (1 capsule)-Given       1147 (1 capsule)-Given       1655 (1 capsule)-Given        0833 (1 capsule)-Given       1128 (1 capsule)-Given       (1711)-Not Given        1115 (1 capsule)-Given [C]       1704 (1 capsule)-Given       2036 (1 capsule)-Given               0842 (1 capsule)-Given       1249 (1 capsule)-Given       [ ] 1700           lidocaine (LMX4) kit  Freq: EVERY 1 HOUR PRN Route: Top  PRN Reason: pain  PRN Comment: with VAD insertion or accessing implanted  "port.  Start: 04/03/17 0353   Admin Instructions: Do NOT give if patient has a history of allergy to any local anesthetic or any \"debbi\" product.   Apply 30 minutes prior to VAD insertion or port access.  MAX Dose:  2.5 g (  of 5 g tube)               lidocaine 1 % 1 mL  Dose: 1 mL Freq: EVERY 1 HOUR PRN Route: OTHER  PRN Comment: mild pain with VAD insertion or accessing implanted port  Start: 04/03/17 0353   Admin Instructions: Do NOT give if patient has a history of allergy to any local anesthetic or any \"debbi\" product. MAX dose 1 mL subcutaneous OR intradermal in divided doses.               lisinopril (PRINIVIL/Zestril) tablet 2.5 mg  Dose: 2.5 mg Freq: DAILY Route: PO  Start: 04/09/17 1015        1023 (2.5 mg)-Given        0905 (2.5 mg)-Given        0843 (2.5 mg)-Given           magnesium sulfate 4 g in 100 mL sterile water (premade)  Dose: 4 g Freq: EVERY 4 HOURS PRN Route: IV  PRN Reason: magnesium supplementation  Start: 04/10/17 1359   Admin Instructions: For serum Mg++ less than 1.6 mg/dL  Give 4 g and recheck magnesium level 2 hours after dose, and next AM.               metFORMIN (GLUCOPHAGE-XR) 24 hr tablet 1,000 mg  Dose: 1,000 mg Freq: DAILY WITH SUPPER Route: PO  Start: 04/09/17 1700   Admin Instructions: DO NOT CRUSH. If the patient receives intravenous, iodinated contrast, hold metformin for 24 hours before AND 48 hours after procedure. Contact pharmacy if no hold order is written.         1714 (1,000 mg)-Given        1815 (1,000 mg)-Given        [ ] 1700           metoprolol (TOPROL-XL) 24 hr tablet 100 mg  Dose: 100 mg Freq: DAILY Route: PO  Start: 04/03/17 0800   Admin Instructions: DO NOT CRUSH. Tablet may be split in half along score line.     0838 (100 mg)-Given        0703 (100 mg)-Given        0855 (100 mg)-Given        0925 (100 mg)-Given        0833 (100 mg)-Given        0907 (100 mg)-Given        0842 (100 mg)-Given           montelukast (SINGULAIR) tablet 10 mg  Dose: 10 mg Freq: AT " BEDTIME Route: PO  Start: 04/03/17 2200    1942 (10 mg)-Given        2049 (10 mg)-Given        2006 (10 mg)-Given        2004 (10 mg)-Given        2054 (10 mg)-Given        2036 (10 mg)-Given        [ ] 2000           naloxone (NARCAN) injection 0.1-0.4 mg  Dose: 0.1-0.4 mg Freq: EVERY 2 MIN PRN Route: IV  PRN Reason: opioid reversal  Start: 04/03/17 0353   Admin Instructions: For respiratory rate LESS than or EQUAL to 8.  Partial reversal dose:  0.1 mg titrated q 2 minutes for Analgesia Side Effects Monitoring Sedation Level of 3 (frequently drowsy, arousable, drifts to sleep during conversation).Full reversal dose:  0.4 mg bolus for Analgesia Side Effects Monitoring Sedation Level of 4 (somnolent, minimal or no response to stimulation).               pantoprazole (PROTONIX) EC tablet 40 mg  Dose: 40 mg Freq: EVERY MORNING Route: PO  Start: 04/07/17 0900   Admin Instructions: DO NOT CRUSH.       0856 (40 mg)-Given        0926 (40 mg)-Given        0833 (40 mg)-Given        0903 (40 mg)-Given        0843 (40 mg)-Given           PARoxetine (PAXIL) tablet 10 mg  Dose: 10 mg Freq: AT BEDTIME Route: PO  Start: 04/03/17 2200    1943 (10 mg)-Given        2049 (10 mg)-Given        2006 (10 mg)-Given        2005 (10 mg)-Given         0002 (10 mg)-Given [C]       2036 (10 mg)-Given        [ ] 2000           Patient is already receiving anticoagulation with heparin, enoxaparin (LOVENOX), warfarin (COUMADIN)  or other anticoagulant medication  Freq: CONTINUOUS PRN Route: XX  Start: 04/03/17 0353              polyethylene glycol (MIRALAX/GLYCOLAX) Packet 17 g  Dose: 17 g Freq: DAILY PRN Route: PO  PRN Reason: constipation  Start: 04/03/17 0353   Admin Instructions: 1 Packet = 17 grams. Mixed prescribed dose in 8 ounces of water. Follow with 8 oz. of water.               potassium chloride (KLOR-CON) Packet 20-40 mEq  Dose: 20-40 mEq Freq: EVERY 2 HOURS PRN Route: ORAL OR FEED  PRN Reason: potassium supplementation  Start:  04/10/17 1359   Admin Instructions: Use if unable to tolerate tablets.  If Serum K+ 3.0-3.3, dose = 60 mEq po total dose (40 mEq x1 followed in 2 hours by 20 mEq x1). Recheck K+ level 4 hours after dose and the next AM.  If Serum K+ 2.5-2.9, dose = 80 mEq po total dose (40 mEq Q2H x2). Recheck K+ level 4 hours after dose and the next AM.  If Serum K+ less than 2.5, See IV order.  Dissolve packet contents in 4-8 ounces of cold water or juice.               potassium chloride 10 mEq in 100 mL intermittent infusion  Dose: 10 mEq Freq: EVERY 1 HOUR PRN Route: IV  PRN Reason: potassium supplementation  Start: 04/10/17 1359   Admin Instructions: Infuse via PERIPHERAL LINE or CENTRAL LINE. Use for central line replacement if patient weight less than 65 kg, if patient is on TPN with high potassium content or if unit does not stock 20 mEq bags.   If Serum K+ 3.0-3.3, dose = 10 mEq/hr x4 doses (40 mEq IV total dose). Recheck K+ level 2 hours after dose and the next AM.   If Serum K+ less than 3.0, dose = 10 mEq/hr x6 doses (60 mEq IV total dose). Recheck K+ level 2 hours after dose and the next AM.               potassium chloride 10 mEq in 100 mL intermittent infusion with 10 mg lidocaine  Dose: 10 mEq Freq: EVERY 1 HOUR PRN Route: IV  PRN Reason: potassium supplementation  Start: 04/10/17 1359   Admin Instructions: Infuse via PERIPHERAL LINE. Use potassium with lidocaine for pain with peripheral administration.  If Serum K+ 3.0-3.3, dose = 10 mEq/hr x4 doses (40 mEq IV total dose). Recheck K+ level 2 hours after dose and the next AM.  If Serum K+ less than 3.0, dose = 10 mEq/hr x6 doses (60 mEq IV total dose). Recheck K+ level 2 hours after dose and the next AM.               potassium chloride 20 mEq in 50 mL intermittent infusion  Dose: 20 mEq Freq: EVERY 1 HOUR PRN Route: IV  PRN Reason: potassium supplementation  Start: 04/10/17 1359   Admin Instructions: Infuse via CENTRAL LINE Only. May need EKG if less than 65 kg or  on TPN - Max rate is 0.3 mEq/kg/hr for patients not on EKG monitoring.   If Serum K+ 3.0-3.3, dose = 20 mEq/hr x2 doses (40 mEq IV total dose). Recheck K+ level 2 hours after dose and the next AM.  If Serum K+ less than 3.0, dose = 20 mEq/hr x3 doses (60 mEq IV total dose). Recheck K+ level 2 hours after dose and the next AM.               potassium chloride SA (K-DUR/KLOR-CON M) CR tablet 20 mEq  Dose: 20 mEq Freq: DAILY Route: PO  Start: 04/09/17 1315   Admin Instructions: DO NOT CRUSH.         1714 (20 mEq)-Given        0905 (20 mEq)-Given        0843 (20 mEq)-Given           potassium chloride SA (K-DUR/KLOR-CON M) CR tablet 20-40 mEq  Dose: 20-40 mEq Freq: EVERY 2 HOURS PRN Route: PO  PRN Reason: potassium supplementation  Start: 04/10/17 1359   Admin Instructions: Use if able to take PO.   If Serum K+ 3.0-3.3, dose = 60 mEq po total dose (40 mEq x1 followed in 2 hours by 20 mEq x1). Recheck K+ level 4 hours after dose and the next AM.  If Serum K+ 2.5-2.9, dose = 80 mEq po total dose (40 mEq Q2H x2). Recheck K+ level 4 hours after dose and the next AM.  If Serum K+ less than 2.5, See IV order.  DO NOT CRUSH.               potassium phosphate 15 mmol in D5W 250 mL intermittent infusion  Dose: 15 mmol Freq: DAILY PRN Route: IV  PRN Reason: phosphorous supplementation  Start: 04/10/17 1400   Admin Instructions: For serum phosphorus level 2-2.4  Do not infuse Phosphorus in the same line as TPN.   Give 15 mmol and recheck phosphorus level next AM.          2032 (15 mmol)-New Bag            potassium phosphate 20 mmol in D5W 250 mL intermittent infusion  Dose: 20 mmol Freq: EVERY 6 HOURS PRN Route: IV  PRN Reason: phosphorous supplementation  Start: 04/10/17 1400   Admin Instructions: For serum phosphorus level 1.1-1.9  For CENTRAL Line ONLY  Do not infuse Phosphorus in the same line as TPN.   Give 20 mmol and recheck phosphorus level 2 hours after last dose and next AM.               potassium phosphate 20 mmol in  D5W 500 mL intermittent infusion  Dose: 20 mmol Freq: EVERY 6 HOURS PRN Route: IV  PRN Reason: phosphorous supplementation  Start: 04/10/17 1400   Admin Instructions: For serum phosphorus level 1.1-1.9  For Peripheral Line  Do not infuse Phosphorus in the same line as TPN.   Give 20 mmol and recheck phosphorus level 2 hours after last dose and next AM.               potassium phosphate 25 mmol in D5W 500 mL intermittent infusion  Dose: 25 mmol Freq: EVERY 8 HOURS PRN Route: IV  PRN Reason: phosphorous supplementation  Start: 04/10/17 1400   Admin Instructions: For serum phosphorus level less than 1.1  Do not infuse Phosphorus in the same line as TPN.   Give 25 mmol and recheck phosphorus level 2 hours after last dose and next AM.               predniSONE (DELTASONE) tablet 30 mg  Dose: 30 mg Freq: DAILY Route: PO  Start: 04/09/17 1300        1445 (30 mg)-Given        0905 (30 mg)-Given        0841 (30 mg)-Given           sodium chloride (PF) 0.9% PF flush 3 mL  Dose: 3 mL Freq: EVERY 8 HOURS Route: IK  Start: 04/03/17 0800   Admin Instructions: And Q1H PRN, to lock peripheral IV dormant line.     0845 (3 mL)-Given               0041 (3 mL)-Given              (1607)-Not Given        0027 (3 mL)-Given       (0921)-Not Given       (1809)-Not Given        0009 (3 mL)-Given       0927 (3 mL)-Given       (1630)-Not Given       2138 (3 mL)-Given               0856 (3 mL)-Given       1711 (3 mL)-Given        0005 (3 mL)-Given       0912 (3 mL)-Given       2032 (3 mL)-Given        0052 (3 mL)-Given       0844 (3 mL)-Given       (1547)-Not Given           sodium chloride (PF) 0.9% PF flush 3 mL  Dose: 3 mL Freq: EVERY 1 HOUR PRN Route: IK  PRN Reason: line flush  PRN Comment: for peripheral IV flush post IV meds  Start: 04/03/17 0353              spironolactone (ALDACTONE) tablet 25 mg  Dose: 25 mg Freq: DAILY Route: PO  Start: 04/06/17 0800     (1001)-Not Given        0854 (25 mg)-Given        0925 (25 mg)-Given        0833  (25 mg)-Given        0903 (25 mg)-Given        0844 (25 mg)-Given           traZODone (DESYREL) half-tab 25-50 mg  Dose: 25-50 mg Freq: AT BEDTIME PRN Route: PO  PRN Reason: sleep  Start: 04/05/17 1744       0040 (50 mg)-Given              Warfarin Therapy Reminder (Check START DATE - warfarin may be starting in the FUTURE)  Freq: CONTINUOUS PRN Route: XX  Start: 04/03/17 0353   Admin Instructions: *Note to reorder warfarin daily*  Pharmacy Warfarin Dosing Service  Patient is on Warfarin Therapy - check for daily order              Future Medications  Medications 04/05/17 04/06/17 04/07/17 04/08/17 04/09/17 04/10/17 04/11/17       levofloxacin (LEVAQUIN) tablet 500 mg  Dose: 500 mg Freq: DAILY Route: PO  Indications of Use: COMMUNITY ACQUIRED PNEUMONIA  Start: 04/12/17 0800   End: 04/14/17 0759   Admin Instructions: Administer at least 2 hrs before or 4 hrs after aluminum, calcium, iron, zinc or magnesium containing products.               warfarin (COUMADIN) tablet 7.5 mg  Dose: 7.5 mg Freq: ONCE AT 6PM Route: PO  Start: 04/11/17 1800          [ ] 1800          Completed Medications  Medications 04/05/17 04/06/17 04/07/17 04/08/17 04/09/17 04/10/17 04/11/17         Dose: 5 Units Freq: ONCE Route: SC  Start: 04/09/17 0245   End: 04/09/17 0333   Admin Instructions: If given at mealtime, must be administered 5 min before meal or immediately after.         0333 (5 Units)-Given               Dose: 3 Units Freq: ONCE Route: SC  Start: 04/08/17 2345   End: 04/08/17 2350   Admin Instructions: Please give in addition to 7 units given at 2135.  Thank you  If given at mealtime, must be administered 5 min before meal or immediately after.        2350 (3 Units)-Given                Dose: 4 Units Freq: ONCE Route: SC  Start: 04/09/17 1000   End: 04/09/17 1025        1025 (4 Units)-Given               Dose: 3 mg Freq: ONCE AT 6PM Route: PO  Start: 04/08/17 1800   End: 04/08/17 1758       1758 (3 mg)-Given                Dose: 6  mg Freq: ONCE AT 6PM Route: PO  Start: 04/10/17 1800   End: 04/10/17 1815         1815 (6 mg)-Given           Discontinued Medications  Medications 04/05/17 04/06/17 04/07/17 04/08/17 04/09/17 04/10/17 04/11/17         Dose: 2 mL Freq: EVERY 6 HOURS Route: NEBULIZATION  Start: 04/09/17 0800   End: 04/09/17 2111   Admin Instructions: Nebulization tubing with a FILTER is recommended with acetylcysteine nebs.         0903 (2 mL)-Given       1245 (2 mL)-Given              2104 (2 mL)-Given       2111-Med Discontinued           Dose: 2 mL Freq: EVERY 6 HOURS Route: NEBULIZATION  Start: 04/09/17 0000   End: 04/09/17 0117   Admin Instructions: Nebulization tubing with a FILTER is recommended with acetylcysteine nebs.         0116 (2 mL)-Given       0117-Med Discontinued           Rate: 0-24 mL/hr Freq: CONTINUOUS Route: IV  Last Dose: Stopped (04/08/17 1000)  Start: 04/07/17 1715   End: 04/08/17 2330   Admin Instructions: Initiate drip with Algorithm #1 (see hyperlink to protocol). Start protocol only if glucose greater than 150 mg/dL. Maintain glucose level between 100-150 mg/dL.  Discontinue when glycemic control achieved and transitioning to SQ insulin, or Insulin therapy no longer required. When blood glucose has stabilized and patient is tolerating PO intake, call provider for transition to SQ insulin.  For Infusion Instructions, Click on ADULT DRIP PROTOCOL hyperlink below.       1912 (5 Units/hr)-New Bag       2005 (4.5 Units/hr)-Rate/Dose Change       2100 (3.5 Units/hr)-Rate/Dose Change       2200 (4 Units/hr)-Rate/Dose Change       2300 (9 Units/hr)-Rate/Dose Change [C]        0000 (6 Units/hr)-Rate/Dose Change       0007 (6 Units/hr)-New Bag       0100 (4 Units/hr)-Rate/Dose Change       0200 (3 Units/hr)-Rate/Dose Change       0300 (2 Units/hr)-Rate/Dose Change [C]       0400 (2 Units/hr)-Rate/Dose Verify       0500 (2 Units/hr)-Rate/Dose Verify       0600 (2 Units/hr)-Rate/Dose Verify       0700 (2  Units/hr)-Rate/Dose Verify       0800 (2 Units/hr)-Rate/Dose Verify       0900 (2 Units/hr)-Rate/Dose Verify       1000-Stopped       2330-Med Discontinued            Dose: 3 Units Freq: 3 TIMES DAILY WITH MEALS Route: SC  Start: 04/09/17 0800   End: 04/08/17 2342   Admin Instructions: Please give in addition to 7 units already given at 2135  If given at mealtime, must be administered 5 min before meal or immediately after.        2342-Med Discontinued            Dose: 1-7 Units Freq: AT BEDTIME Route: SC  Start: 04/08/17 2200   End: 04/11/17 0958   Admin Instructions: HIGH INSULIN RESISTANCE DOSING    Do Not give Bedtime Correction Insulin if BG less than 200.   For  - 224 give 1 units.   For  - 249 give 2 units.   For  - 274 give 3 units.   For  - 299 give 4 units.   For  - 324 give 5 units.   For  - 349 give 6 units.   For BG greater than or equal to 350 give 7 units.   Notify provider if glucose greater than or equal to 350 mg/dL after administration of correction dose.  If given at mealtime, must be administered 5 min before meal or immediately after.        2135 (7 Units)-Given [C]        2252 (2 Units)-Given        2148 (1 Units)-Given [C]        0958-Med Discontinued         Dose: 1-10 Units Freq: 3 TIMES DAILY BEFORE MEALS Route: SC  Start: 04/08/17 1730   End: 04/11/17 0958   Admin Instructions: Correction Scale - HIGH INSULIN RESISTANCE DOSING     Do Not give Correction Insulin if Pre-Meal BG less than 140.   For Pre-Meal  - 164 give 1 unit.   For Pre-Meal  - 189 give 2 units.   For Pre-Meal  - 214 give 3 units.   For Pre-Meal  - 239 give 4 units.   For Pre-Meal  - 264 give 5 units.   For Pre-Meal  - 289 give 6 units.   For Pre-Meal  - 314 give 7 units.   For Pre-Meal  - 339 give 8 units.   For Pre-Meal  - 364 give 9 units.   For Pre-Meal BG greater than or equal to 365 give 10 units  To be given with prandial  insulin, and based on pre-meal blood glucose.   Notify provider if glucose greater than or equal to 350 mg/dL after administration of correction dose.  If given at mealtime, must be administered 5 min before meal or immediately after.        (1754)-Not Given [C]        0730 (2 Units)-Given       (1157)-Not Given       (1710)-Not Given        (0845)-Not Given [C]       1313 (2 Units)-Given [C]       1817 (5 Units)-Given [C]        (0835)-Not Given       0958-Med Discontinued         Freq: DAILY WITH SUPPER Route: SC  Start: 04/08/17 1700   End: 04/11/17 0958   Admin Instructions: DOSE:  1 units per 9 grams of carbohydrate. Only chart total amount of units given.  Do not give if pre-prandial glucose is less than 60 mg/dL.  If given at mealtime, must be administered 5 min before meal or immediately after.        1755 (10 Units)-Given        1803 (9 Units)-Given        1820 (9 Units)-Given [C]        0958-Med Discontinued         Freq: DAILY WITH LUNCH Route: SC  Start: 04/08/17 1200   End: 04/11/17 0958   Admin Instructions: DOSE:  1 units per 9 grams of carbohydrate. Only chart total amount of units given.  Do not give if pre-prandial glucose is less than 60 mg/dL.  If given at mealtime, must be administered 5 min before meal or immediately after.        1251 (6 Units)-Given        1219 (6 Units)-Given        1319 (8 Units)-Given [C]        0958-Med Discontinued         Freq: EVERY MORNING BEFORE BREAKFAST Route: SC  Start: 04/09/17 0730   End: 04/11/17 0958   Admin Instructions: DOSE:  1 units per 9 grams of carbohydrate.  Only chart total amount of units given.  Do not give if pre-prandial glucose is less than 60 mg/dL.  If given at mealtime, must be administered 5 min before meal or immediately after.         0829 (7 Units)-Given        0853 (13 Units)-Given [C]        0852 (11 Units)-Given       0958-Med Discontinued         Dose: 1-5 Units Freq: AT BEDTIME Route: SC  Start: 04/08/17 2200   End: 04/08/17 1715    Admin Instructions: MEDIUM INSULIN RESISTANCE DOSING    Do Not give Bedtime Correction Insulin if BG less than  200.   For  - 249 give 1 units.   For  - 299 give 2 units.   For  - 349 give 3 units.   For  -399 give 4 units.   For BG greater than or equal to 400 give 5 units.  Notify provider if glucose greater than or equal to 350 mg/dL after administration of correction dose.  If given at mealtime, must be administered 5 min before meal or immediately after.        1715-Med Discontinued            Dose: 1-7 Units Freq: 3 TIMES DAILY BEFORE MEALS Route: SC  Start: 04/08/17 1200   End: 04/08/17 1715   Admin Instructions: Correction Scale - MEDIUM INSULIN RESISTANCE DOSING     Do Not give Correction Insulin if Pre-Meal BG less than 140.   For Pre-Meal  - 189 give 1 unit.   For Pre-Meal  - 239 give 2 units.   For Pre-Meal  - 289 give 3 units.   For Pre-Meal  - 339 give 4 units.   For Pre-Meal - 399 give 5 units.   For Pre-Meal -449 give 6 units  For Pre-Meal BG greater than or equal to 450 give 7 units.   To be given with prandial insulin, and based on pre-meal blood glucose.    Notify provider if glucose greater than or equal to 350 mg/dL after administration of correction dose.  If given at mealtime, must be administered 5 min before meal or immediately after.        1223 (4 Units)-Given       1707 (5 Units)-Given       1715-Med Discontinued            Dose: 19 Units Freq: EVERY MORNING BEFORE BREAKFAST Route: SC  Start: 04/08/17 0945   End: 04/09/17 0959   Admin Instructions: Give now then stop drip after 2 hours        1146 (19 Units)-Given        0733 (19 Units)-Given       0959-Med Discontinued           Dose: 3 mL Freq: EVERY 4 HOURS SCHEDULED Route: NEBULIZATION  Start: 04/06/17 1600   End: 04/09/17 2111            2126 (3 mL)-Given        0032 (3 mL)-Given       0438 (3 mL)-Given       0844 (3 mL)-Given       1323 (3 mL)-Given       1708 (3  mL)-Given       2035 (3 mL)-Given        0031 (3 mL)-Given       0404 (3 mL)-Given       0942 (3 mL)-Given       1351 (3 mL)-Given       1732 (3 mL)-Given       1943 (3 mL)-Given        0115 (3 mL)-Given       0410 (3 mL)-Given       0902 (3 mL)-Given       1245 (3 mL)-Given       (1717)-Not Given       2104 (3 mL)-Given       2111-Med Discontinued           Dose: 750 mg Freq: EVERY 24 HOURS Route: PO  Indications of Use: HEALTHCARE-ASSOCIATED PNEUMONIA  Start: 04/08/17 0800   End: 04/11/17 1002   Admin Instructions: Administer at least 2 hrs before or 4 hrs after aluminum, calcium, iron, zinc or magnesium containing products.        1000 (750 mg)-Given        0833 (750 mg)-Given        0903 (750 mg)-Given        0843 (750 mg)-Given       1002-Med Discontinued         Dose: 75 mg Freq: 2 TIMES DAILY Route: PO  Indications of Use: INFLUENZA  Start: 04/05/17 0930   End: 04/09/17 2359    1032 (75 mg)-Given       1941 (75 mg)-Given        (1001)-Not Given       2049 (75 mg)-Given        0920 (75 mg)-Given       2006 (75 mg)-Given        0927 (75 mg)-Given       2004 (75 mg)-Given        0833 (75 mg)-Given       2054 (75 mg)-Given       2359-Med Discontinued           Dose: 20-40 mEq Freq: EVERY 2 HOURS PRN Route: ORAL OR FEED  PRN Reason: potassium supplementation  Start: 04/03/17 0626   End: 04/10/17 1408   Admin Instructions: Use if unable to tolerate tablets.  If Serum K+ 3.0-3.3, dose = 60 mEq po total dose (40 mEq x1 followed in 2 hours by 20 mEq x1). Recheck K+ level 4 hours after dose and the next AM.  If Serum K+ 2.5-2.9, dose = 80 mEq po total dose (40 mEq Q2H x2). Recheck K+ level 4 hours after dose and the next AM.  If Serum K+ less than 2.5, See IV order.  Dissolve packet contents in 4-8 ounces of cold water or juice.          1408-Med Discontinued          Dose: 10 mEq Freq: EVERY 1 HOUR PRN Route: IV  PRN Reason: potassium supplementation  Start: 04/03/17 0626   End: 04/10/17 1408   Admin Instructions:  Infuse via PERIPHERAL LINE or CENTRAL LINE. Use for central line replacement if patient weight less than 65 kg, if patient is on TPN with high potassium content or if unit does not stock 20 mEq bags.   If Serum K+ 3.0-3.3, dose = 10 mEq/hr x4 doses (40 mEq IV total dose). Recheck K+ level 2 hours after dose and the next AM.   If Serum K+ less than 3.0, dose = 10 mEq/hr x6 doses (60 mEq IV total dose). Recheck K+ level 2 hours after dose and the next AM.          1408-Med Discontinued          Dose: 10 mEq Freq: EVERY 1 HOUR PRN Route: IV  PRN Reason: potassium supplementation  Start: 04/03/17 0626   End: 04/10/17 1408   Admin Instructions: Infuse via PERIPHERAL LINE. Use potassium with lidocaine for pain with peripheral administration.  If Serum K+ 3.0-3.3, dose = 10 mEq/hr x4 doses (40 mEq IV total dose). Recheck K+ level 2 hours after dose and the next AM.  If Serum K+ less than 3.0, dose = 10 mEq/hr x6 doses (60 mEq IV total dose). Recheck K+ level 2 hours after dose and the next AM.          1408-Med Discontinued          Dose: 20 mEq Freq: EVERY 1 HOUR PRN Route: IV  PRN Reason: potassium supplementation  Start: 04/03/17 0626   End: 04/10/17 1408   Admin Instructions: Infuse via CENTRAL LINE Only. May need EKG if less than 65 kg or on TPN - Max rate is 0.3 mEq/kg/hr for patients not on EKG monitoring.   If Serum K+ 3.0-3.3, dose = 20 mEq/hr x2 doses (40 mEq IV total dose). Recheck K+ level 2 hours after dose and the next AM.  If Serum K+ less than 3.0, dose = 20 mEq/hr x3 doses (60 mEq IV total dose). Recheck K+ level 2 hours after dose and the next AM.          1408-Med Discontinued          Dose: 20-40 mEq Freq: EVERY 2 HOURS PRN Route: PO  PRN Reason: potassium supplementation  Start: 04/03/17 0626   End: 04/10/17 1408   Admin Instructions: Use if able to take PO.   If Serum K+ 3.0-3.3, dose = 60 mEq po total dose (40 mEq x1 followed in 2 hours by 20 mEq x1). Recheck K+ level 4 hours after dose and the next  AM.  If Serum K+ 2.5-2.9, dose = 80 mEq po total dose (40 mEq Q2H x2). Recheck K+ level 4 hours after dose and the next AM.  If Serum K+ less than 2.5, See IV order.  DO NOT CRUSH.          1408-Med Discontinued          Dose: 40 mg Freq: DAILY Route: PO  Start: 04/07/17 1300   End: 04/09/17 0610      1519 (40 mg)-Given        1146 (40 mg)-Given        0610-Med Discontinued           Dose: 1 each Freq: NO DOSE TODAY (WARFARIN) Route: XX  Start: 04/09/17 1025   End: 04/10/17 1024   Admin Instructions: No dose of Warfarin due today per order          1024-Med Discontinued     Medications 04/05/17 04/06/17 04/07/17 04/08/17 04/09/17 04/10/17 04/11/17

## 2017-04-02 NOTE — IP AVS SNAPSHOT
"    [facility]: [phone]                                              INTERAGENCY TRANSFER FORM - PHYSICIAN ORDERS   2017                    Hospital Admission Date: 2017  JEAN KENNEDY   : 1940  Sex: Female        Attending Provider: Lenny Greenwood MD     Allergies:  Augmentin, Codeine, Phenobarbital    Infection:  None   Service:  FAMILY MEDIC    Ht:  1.695 m (5' 6.73\")   Wt:  93.4 kg (205 lb 14.4 oz)   Admission Wt:  92.8 kg (204 lb 8 oz)    BMI:  32.51 kg/m 2   BSA:  2.1 m 2            Patient PCP Information     Provider PCP Type    Ilene Tristan MD General      ED Clinical Impression     Diagnosis Description Comment Added By Time Added    Pneumonia due to infectious organism, unspecified laterality, unspecified part of lung [J18.9] Pneumonia due to infectious organism, unspecified laterality, unspecified part of lung [J18.9]  Sole Ibarra MD 2017 10:57 PM    Atrial fibrillation with rapid ventricular response (H) [I48.91] Atrial fibrillation with rapid ventricular response (H) [I48.91]  Sole Ibarra MD 2017 10:58 PM    Fever, unspecified [R50.9] Fever, unspecified [R50.9]  Sole Ibarra MD 2017 10:58 PM      Hospital Problems as of 2017              [facility] Class Noted POA    CAP (community acquired pneumonia) Medium  4/3/2017 Yes    HCAP (healthcare-associated pneumonia) Medium  2017 No    Influenza B Medium  2017 Yes    Heart failure, chronic, with acute decompensation (H) Medium  2017 No    Type 2 diabetes mellitus with hyperglycemia (H) Medium  2017 Yes      Non-Hospital Problems as of 2017              [facility] Class Noted    Temporomandibular joint disorder Low  2003    Diverticulitis of colon High  2004    Disorder of bone and cartilage Low  2004    Osteoarthritis Low  2004    Alcohol abuse, in remission Low  2004    Restless legs syndrome (RLS) Medium  2007    Major depressive " disorder, recurrent episode, moderate Medium  Unknown    Essential hypertension with goal blood pressure less than 140/90 Medium  10/19/2010    CARDIOVASCULAR SCREENING; LDL GOAL LESS THAN 130 Low  10/31/2010    Sciatica Medium  11/3/2010    Advanced directives, counseling/discussion Low  11/1/2011    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    Atrial fibrillation with controlled ventricular response (H) High  11/4/2011    Left ventricular hypertrophy Medium  11/4/2011    Health Care Home Low  2/9/2012    Insomnia   5/26/2012    Joint pains   1/8/2013    Allergic reaction caused by a drug - likely plaquenil   4/30/2013    Breast fibroadenoma   8/18/2014    DVT (deep venous thrombosis) (H)   9/15/2014    Fatty liver disease, nonalcoholic Medium  9/24/2014    Rib pain   10/31/2014    Neck mass   4/28/2015    Gastroesophageal reflux disease without esophagitis Medium  7/23/2015    Enlarged lymph node Medium  8/4/2015    Sjogren's syndrome (H) Medium  10/30/2015    ANGELICA (obstructive sleep apnea)   4/12/2016    ILD (interstitial lung disease) (H) Medium  6/5/2016    Lower GI bleed Medium  6/26/2016    Acute and chronic respiratory failure with hypoxia (H) Medium  7/2/2016    Acute edema of lung (H) Medium  7/4/2016    (HFpEF) heart failure with preserved ejection fraction (H)   8/27/2016    Type 2 diabetes mellitus without complication, without long-term current use of insulin (H) Medium  10/24/2016      Code Status History     Date Active Date Inactive Code Status Order ID Comments User Context    7/4/2016 11:28 AM 4/3/2017  3:53 AM DNR/DNI [id number]  William Kwan MD Outpatient    7/2/2016  2:36 PM 7/4/2016 11:28 AM DNR/DNI [id number]  William Kwan MD Inpatient    6/26/2016  6:05 PM 6/28/2016  4:06 PM Full Code [id number]  Manny Case MD Inpatient    2/8/2012  1:58 PM 6/26/2016  6:05 PM Full Code [id number]  Raj Unger MD Outpatient    2/1/2012  5:24 PM 2/8/2012  1:58 PM Full Code  [id number]  Elizabeth Benson, RN Inpatient    11/16/2011  2:54 PM 2/1/2012  5:24 PM Full Code [id number]  CatrinaMya borrego LEONEL Outpatient    11/7/2011 10:08 PM 11/16/2011  2:54 PM Full Code [id number]  Monalisa Cuba, JASON Inpatient    11/1/2011  3:15 PM 11/7/2011 10:08 PM DNR [id number] DNR form reviewed and documented.  11/1/11 PADDY Gregory Patty Outpatient    10/7/2011  9:37 AM 10/12/2011  4:07 PM Full Code [id number]  Edda Banda, RN Inpatient    11/21/2003  5:31 PM 11/21/2003  5:31 PM  None  Jasmina Mejia Demographics         Medication Review      START taking        Dose / Directions Comments    atorvastatin 40 MG tablet   Commonly known as:  LIPITOR   Used for:  Type 2 diabetes mellitus with other specified complication (H)        Dose:  40 mg   Take 1 tablet (40 mg) by mouth daily   Quantity:  30 tablet   Refills:  1        guaiFENesin-dextromethorphan 100-10 MG/5ML syrup   Commonly known as:  ROBITUSSIN DM   Used for:  Pneumonia due to infectious organism, unspecified laterality, unspecified part of lung        Dose:  10 mL   Take 10 mLs by mouth every 4 hours as needed for cough   Quantity:  473 mL   Refills:  0        insulin aspart 100 UNIT/ML injection   Commonly known as:  NovoLOG PEN   Used for:  Type 2 diabetes mellitus with other specified complication (H)        Dose:  10 Units   Inject 10 Units Subcutaneous 3 times daily (with meals)   Quantity:  3 mL   Refills:  0        insulin glargine 100 UNIT/ML injection   Commonly known as:  LANTUS   Used for:  Type 2 diabetes mellitus with other specified complication (H)        Dose:  23 Units   Inject 23 Units Subcutaneous every morning (before breakfast)   Quantity:  3 mL   Refills:  1        lactobacillus rhamnosus (GG) capsule   Used for:  Pneumonia due to infectious organism, unspecified laterality, unspecified part of lung        Dose:  1 capsule   Take 1 capsule by mouth 3 times daily (before meals)   Quantity:  18 capsule   Refills:  0         levofloxacin 500 MG tablet   Commonly known as:  LEVAQUIN   Indication:  Community Acquired Pneumonia   Used for:  Pneumonia due to infectious organism, unspecified laterality, unspecified part of lung        Dose:  500 mg   Start taking on:  4/12/2017   Take 1 tablet (500 mg) by mouth daily   Quantity:  2 tablet   Refills:  0        lisinopril 2.5 MG tablet   Commonly known as:  PRINIVIL/Zestril   Used for:  Essential hypertension, malignant        Dose:  2.5 mg   Take 1 tablet (2.5 mg) by mouth daily   Quantity:  30 tablet   Refills:  0        potassium chloride SA 20 MEQ CR tablet   Commonly known as:  K-DUR/KLOR-CON M        Dose:  20 mEq   Take 1 tablet (20 mEq) by mouth daily   Quantity:  90 tablet   Refills:  0          CONTINUE these medications which may have CHANGED, or have new prescriptions. If we are uncertain of the size of tablets/capsules you have at home, strength may be listed as something that might have changed.        Dose / Directions Comments    acyclovir 5 % ointment   Commonly known as:  ZOVIRAX   This may have changed:  additional instructions   Used for:  Recurrent cold sores        Apply topically 6 times daily   Quantity:  15 g   Refills:  3        fluticasone 50 MCG/ACT spray   Commonly known as:  FLONASE   This may have changed:    - when to take this  - reasons to take this   Used for:  Seasonal allergic rhinitis        Dose:  1-2 spray   Spray 1-2 sprays into both nostrils daily   Quantity:  16 g   Refills:  5        PARoxetine 10 MG tablet   Commonly known as:  PAXIL   This may have changed:    - how much to take  - how to take this  - when to take this  - additional instructions   Used for:  Major depressive disorder, recurrent episode, moderate (H)        TAKE 1 TABLET (10mg) BY MOUTH AT BEDTIME   Quantity:  30 tablet   Refills:  1    Please fill upon patient request.       predniSONE 10 MG tablet   Commonly known as:  DELTASONE   This may have changed:    - how much to  take  - how to take this  - when to take this  - additional instructions   Used for:  ILD (interstitial lung disease) (H), Sjogren's syndrome (H)        Take 2 tablets for three days (4/12-14), then take 1 tablet daily (10 mg daily starting 4/15)   Quantity:  90 tablet   Refills:  3          CONTINUE these medications which have NOT CHANGED        Dose / Directions Comments    acyclovir 400 MG tablet   Commonly known as:  ZOVIRAX        Dose:  400 mg   Take 400 mg by mouth See Admin Instructions 5 times daily as needed for outbreaks   Refills:  0        albuterol 108 (90 BASE) MCG/ACT Inhaler   Commonly known as:  PROAIR HFA/PROVENTIL HFA/VENTOLIN HFA   Used for:  ILD (interstitial lung disease) (H)        Dose:  2 puff   Inhale 2 puffs into the lungs every 6 hours   Quantity:  1 Inhaler   Refills:  3        azithromycin 250 MG tablet   Commonly known as:  ZITHROMAX   Used for:  Follicular bronchiolitis (H)        Dose:  250 mg   Take 1 tablet (250 mg) by mouth daily   Quantity:  30 tablet   Refills:  11        COMPRESSION STOCKINGS   Used for:  DVT (deep venous thrombosis), right, Postphlebitic syndrome, Chronic anticoagulation, Atrial fibrillation (H)        Wear compression stockings in affected leg (right leg) or both legs most of the time during the day and take them off at night.   Quantity:  2 each   Refills:  2    Please dispense 2 pairs of knee high graduated compression stockings at the rating of 20-30 mmHg with 2 refills.       fluticasone 220 MCG/ACT Inhaler   Commonly known as:  FLOVENT HFA   Used for:  ILD (interstitial lung disease) (H), Follicular bronchiolitis (H)        Dose:  2 puff   Inhale 2 puffs into the lungs 2 times daily   Quantity:  3 Inhaler   Refills:  3        furosemide 40 MG tablet   Commonly known as:  LASIX   Used for:  Acute edema of lung (H)        Dose:  40 mg   Take 1 tablet (40 mg) by mouth 2 times daily   Quantity:  180 tablet   Refills:  3        ketoconazole 2 % cream    Commonly known as:  NIZORAL   Used for:  Cutaneous candidiasis        Apply topically 2 times daily   Quantity:  60 g   Refills:  1        metFORMIN 500 MG 24 hr tablet   Commonly known as:  GLUCOPHAGE-XR   Used for:  Type 2 diabetes mellitus without complication, without long-term current use of insulin (H)        Dose:  1000 mg   Take 2 tablets (1,000 mg) by mouth daily (with dinner)   Quantity:  180 tablet   Refills:  0        metoprolol 100 MG 24 hr tablet   Commonly known as:  TOPROL-XL   Used for:  Atrial fibrillation with controlled ventricular response (H)        Dose:  100 mg   Take 1 tablet (100 mg) by mouth daily   Quantity:  90 tablet   Refills:  3        montelukast 10 MG tablet   Commonly known as:  SINGULAIR   Used for:  Sjogren's syndrome (H), ILD (interstitial lung disease) (H)        Dose:  10 mg   Take 1 tablet (10 mg) by mouth At Bedtime   Quantity:  90 tablet   Refills:  1        * order for DME   Used for:  Hypoxia, ILD (interstitial lung disease) (H)        Oxygen: Patient requires supplemental Oxygen 4 LPM via nasal canula with activity. Please provide patient with a home unit and with portability capability. Oxygen will be for a lifetime.   Quantity:  1 Device   Refills:  0        * order for DME   Used for:  Hypoxia, ILD (interstitial lung disease) (H)        Oxygen: Patient requires supplemental Oxygen 4 LPM via nasal canula with activity. Please provide patient with POC to improve mobility, okay to titrate for conserving to keep stats above 90%. Please fax results to [phone].  Oxygen will be for a lifetime.   Quantity:  1 Device   Refills:  0        pantoprazole 20 MG EC tablet   Commonly known as:  PROTONIX   Used for:  Gastroesophageal reflux disease without esophagitis        Dose:  20-40 mg   Take 1-2 tablets (20-40 mg) by mouth daily   Quantity:  60 tablet   Refills:  1    Please fill upon patient request.       polyethylene glycol Packet   Commonly known as:  MIRALAX/GLYCOLAX    Used for:  Constipation, unspecified constipation type        Dose:  17 g   Take 17 g by mouth daily as needed for constipation   Quantity:  7 packet   Refills:  0        spironolactone 25 MG tablet   Commonly known as:  ALDACTONE   Used for:  Chronic diastolic congestive heart failure (H)        Dose:  25 mg   Take 1 tablet (25 mg) by mouth daily   Quantity:  90 tablet   Refills:  3        traZODone 50 MG tablet   Commonly known as:  DESYREL   Used for:  Insomnia due to medical condition        TAKE 1/2-1 TABLET BY MOUTH NIGHTLY AS NEEDED, CAN TITRATE DOWN TO 1/2TAB OR UP TO 2TABS AS NEEDED   Quantity:  60 tablet   Refills:  3        TYLENOL 500 MG tablet   Used for:  Dizziness   Generic drug:  acetaminophen        Dose:  500 mg   Take 500 mg by mouth nightly as needed   Refills:  0        warfarin 7.5 MG tablet   Commonly known as:  COUMADIN   Used for:  Atrial fibrillation with controlled ventricular response (H)        Current dose is 7.5 mg daily.  Dose adjusted per INR result.   Quantity:  100 tablet   Refills:  3        * Notice:  This list has 2 medication(s) that are the same as other medications prescribed for you. Read the directions carefully, and ask your doctor or other care provider to review them with you.            Summary of Visit     Reason for your hospital stay       You were hospitalized for the influenza and an acute exacerbation of heart failure.  You were also treated for a pneumonia.             After Care     Activity - Up ad jolanta           Advance Diet as Tolerated       Follow this diet upon discharge: Orders Placed This Encounter      2 Gram Sodium Diet       Daily weights       Call Provider for weight gain of more than 2 pounds per day or 5 pounds per week.       General info for SNF       Length of Stay Estimate: Short Term Care: Estimated # of Days <30  Condition at Discharge: Improving  Level of care:skilled   Rehabilitation Potential: Good  Admission H&P remains valid and  up-to-date: Yes  Recent Chemotherapy: N/A  Use Nursing Home Standing Orders: Yes       Glucose monitor nursing POCT       Before meals and at bedtime       Intake and output       Every shift       Mantoux instructions       Give two-step Mantoux (PPD) Per Facility Policy Yes             Procedures     Oxygen - Nasal cannula       2-3 Lpm by nasal cannula to keep O2 sats 92% or greater.       Oxygen Adult       Renew Home Oxygen Order  Renew previous prescription.  Expected treatment length is indefinite (99 months).  ___________________________  [facility]  Phone  [phone]  Fax  [phone]  ___________________________    Attending Provider: Abhishek Elizabeth MD  Physician signature: See electronic signature associated with these discharge orders  Date of Order: April 5, 2017             Referrals     Occupational Therapy Adult Consult       Evaluate and treat as clinically indicated.    Reason:  Physical deconditioning       Physical Therapy Adult Consult       Evaluate and treat as clinically indicated.    Reason:  Physical deconditioning             Your next 10 appointments already scheduled     Jun 22, 2017  4:30 PM CDT   Lab with  LAB   [facility] ([facility])    [address] [place] [id number]   [phone]            Jun 22, 2017  5:00 PM CDT   (Arrive by 4:45 PM)   RETURN HEART FAILURE with Radha Rosa MD   [facility] ([facility])    [address] [place] [id number]   [phone]            Jul 05, 2017 11:00 AM CDT   PFT VISIT with  PFL B   [facility] Pulmonary Function Testing ([facility])    [address] [place] [id number]   [phone]            Jul 05, 2017 11:30 AM CDT   (Arrive by 11:15 AM)   Return Interstitial Lung with Meño Walsh MD   [facility]    [address]  3rd Floor  [facility] [address] [phone]              Follow-Up Appointment Instructions     Future Labs/Procedures    Follow Up and recommended labs and tests     Comments:    Follow up with Nursing home physician on Friday to have electrolytes checked.  The following labs/tests are recommended: BMP, Magnesium, Phosphorus and INR.      Follow-Up Appointment Instructions     Follow Up and recommended labs and tests       Follow up with Nursing home physician on Friday to have electrolytes checked.  The following labs/tests are recommended: BMP, Magnesium, Phosphorus and INR.             Statement of Approval     Ordered          04/11/17 1537  I have reviewed and agree with all the recommendations and orders detailed in this document.  EFFECTIVE NOW     Approved and electronically signed by:  Aly Villegas MD

## 2017-04-02 NOTE — IP AVS SNAPSHOT
MRN:[id number]                      After Visit Summary   4/2/2017    Betty Tee    MRN: [id number]           Thank you!     Thank you for choosing [facility] for your care. Our goal is always to provide you with excellent care. Hearing back from our patients is one way we can continue to improve our services. Please take a few minutes to complete the written survey that you may receive in the mail after you visit with us. Thank you!        Patient Information     Date Of Birth          1940        Designated Caregiver       Most Recent Value    Caregiver    Will someone help with your care after discharge? yes    Name of designated caregiver Sister Yvette Chaves    Phone number of caregiver [phone]    Caregiver address same as patients      About your hospital stay     You were admitted on:  April 3, 2017 You last received care in the:  [address] [facility]    You were discharged on:  April 11, 2017        Reason for your hospital stay       You were hospitalized for the influenza and an acute exacerbation of heart failure.  You were also treated for a pneumonia.                  Who to Call     For medical emergencies, please call 911.  For non-urgent questions about your medical care, please call your primary care provider or clinic, [phone]          Attending Provider     Provider Specialty    Sole Ibarra MD Emergency Medicine    [facility], An Cunningham MD Internal Medicine    Abhishek Elizabeth MD Family Practice    Lenny Greenwood MD Family Practice       Primary Care Provider Office Phone # Fax #    Ilene Tristan [phone]       [facility] [address] [address]        After Care Instructions     Activity - Up ad jolanta           Advance Diet as Tolerated       Follow this diet upon discharge: Orders Placed This Encounter      2 Gram Sodium Diet            Daily weights       Call Provider for weight gain of  more than 2 pounds per day or 5 pounds per week.            General info for SNF       Length of Stay Estimate: Short Term Care: Estimated # of Days <30  Condition at Discharge: Improving  Level of care:skilled   Rehabilitation Potential: Good  Admission H&P remains valid and up-to-date: Yes  Recent Chemotherapy: N/A  Use Nursing Home Standing Orders: Yes            Glucose monitor nursing POCT       Before meals and at bedtime            Intake and output       Every shift            Mantoux instructions       Give two-step Mantoux (PPD) Per Facility Policy Yes            Oxygen - Nasal cannula       2-3 Lpm by nasal cannula to keep O2 sats 92% or greater.            Oxygen Adult       Renew Home Oxygen Order  Renew previous prescription.  Expected treatment length is indefinite (99 months).  ___________________________  [facility]  Phone  [phone]  Fax  [phone]  ___________________________    Attending Provider: Abhishek Elizabeth MD  Physician signature: See electronic signature associated with these discharge orders  Date of Order: April 5, 2017                  Follow-up Appointments     Follow Up and recommended labs and tests       Follow up with Nursing home physician on Friday to have electrolytes checked.  The following labs/tests are recommended: BMP, Magnesium, Phosphorus and INR.                  Your next 10 appointments already scheduled     Jun 22, 2017  4:30 PM CDT   Lab with  LAB   [facility] ([facility])    [address]  1st Floor  [facility] [id number] [phone]            Jun 22, 2017  5:00 PM CDT   (Arrive by 4:45 PM)   RETURN HEART FAILURE with Radha Rosa MD   [facility] Heart Care ([facility])    [address]  3rd Floor  [facility] [id number] [phone]            Jul 05, 2017 11:00 AM CDT   PFT VISIT with  PFL B   [facility] Pulmonary Function Testing ([facility])    [address]  3rd [facility] [address]   [phone]            Jul 05, 2017 11:30 AM CDT   (Arrive by 11:15 AM)   Return Interstitial Lung with Meño Walsh MD   [facility] ([facility])    [address] [address]   [phone]              Additional Services     Occupational Therapy Adult Consult       Evaluate and treat as clinically indicated.    Reason:  Physical deconditioning            Physical Therapy Adult Consult       Evaluate and treat as clinically indicated.    Reason:  Physical deconditioning                  Pending Results     Date and Time Order Name Status Description    4/6/2017 0749 Blood culture Preliminary     4/6/2017 0749 Blood culture Preliminary             Statement of Approval     Ordered          04/11/17 1537  I have reviewed and agree with all the recommendations and orders detailed in this document.  EFFECTIVE NOW     Approved and electronically signed by:  Aly Villegas MD             Admission Information     Date & Time Provider Department Dept. Phone    4/2/2017 Lenny Greenwood MD [address] [facility] [phone]      Your Vitals Were     Blood Pressure Pulse Temperature Respirations Weight Pulse Oximetry    127/74 (BP Location: Right arm) 82 98.5  F (36.9  C) (Oral) 16 93.4 kg (205 lb 14.4 oz) 90%    BMI (Body Mass Index)                   32.51 kg/m2           MyChart Information     [email or web address] gives you secure access to your electronic health record. If you see a primary care provider, you can also send messages to your care team and make appointments. If you have questions, please call your primary care clinic.  If you do not have a primary care provider, please call [phone] and they will assist you.        Care EveryWhere ID     This is your Care EveryWhere ID. This could be used by other organizations to access your [facility]  records  [id number]           Review of your medicines      START taking        Dose / Directions    atorvastatin 40 MG tablet   Commonly known as:  LIPITOR   Used for:  Type 2 diabetes mellitus with other specified complication (H)        Dose:  40 mg   Take 1 tablet (40 mg) by mouth daily   Quantity:  30 tablet   Refills:  1       guaiFENesin-dextromethorphan 100-10 MG/5ML syrup   Commonly known as:  ROBITUSSIN DM   Used for:  Pneumonia due to infectious organism, unspecified laterality, unspecified part of lung        Dose:  10 mL   Take 10 mLs by mouth every 4 hours as needed for cough   Quantity:  473 mL   Refills:  0       insulin aspart 100 UNIT/ML injection   Commonly known as:  NovoLOG PEN   Used for:  Type 2 diabetes mellitus with other specified complication (H)        Dose:  10 Units   Inject 10 Units Subcutaneous 3 times daily (with meals)   Quantity:  3 mL   Refills:  0       insulin glargine 100 UNIT/ML injection   Commonly known as:  LANTUS   Used for:  Type 2 diabetes mellitus with other specified complication (H)        Dose:  23 Units   Inject 23 Units Subcutaneous every morning (before breakfast)   Quantity:  3 mL   Refills:  1       lactobacillus rhamnosus (GG) capsule   Used for:  Pneumonia due to infectious organism, unspecified laterality, unspecified part of lung        Dose:  1 capsule   Take 1 capsule by mouth 3 times daily (before meals)   Quantity:  18 capsule   Refills:  0       levofloxacin 500 MG tablet   Commonly known as:  LEVAQUIN   Indication:  Community Acquired Pneumonia   Used for:  Pneumonia due to infectious organism, unspecified laterality, unspecified part of lung        Dose:  500 mg   Start taking on:  4/12/2017   Take 1 tablet (500 mg) by mouth daily   Quantity:  2 tablet   Refills:  0       lisinopril 2.5 MG tablet   Commonly known as:  PRINIVIL/Zestril   Used for:  Essential hypertension, malignant        Dose:  2.5 mg   Take 1 tablet (2.5 mg) by mouth daily    Quantity:  30 tablet   Refills:  0       potassium chloride SA 20 MEQ CR tablet   Commonly known as:  K-DUR/KLOR-CON M        Dose:  20 mEq   Take 1 tablet (20 mEq) by mouth daily   Quantity:  90 tablet   Refills:  0         CONTINUE these medicines which may have CHANGED, or have new prescriptions. If we are uncertain of the size of tablets/capsules you have at home, strength may be listed as something that might have changed.        Dose / Directions    acyclovir 5 % ointment   Commonly known as:  ZOVIRAX   This may have changed:  additional instructions   Used for:  Recurrent cold sores        Apply topically 6 times daily   Quantity:  15 g   Refills:  3       fluticasone 50 MCG/ACT spray   Commonly known as:  FLONASE   This may have changed:    - when to take this  - reasons to take this   Used for:  Seasonal allergic rhinitis        Dose:  1-2 spray   Spray 1-2 sprays into both nostrils daily   Quantity:  16 g   Refills:  5       PARoxetine 10 MG tablet   Commonly known as:  PAXIL   This may have changed:    - how much to take  - how to take this  - when to take this  - additional instructions   Used for:  Major depressive disorder, recurrent episode, moderate (H)        TAKE 1 TABLET (10mg) BY MOUTH AT BEDTIME   Quantity:  30 tablet   Refills:  1       predniSONE 10 MG tablet   Commonly known as:  DELTASONE   This may have changed:    - how much to take  - how to take this  - when to take this  - additional instructions   Used for:  ILD (interstitial lung disease) (H), Sjogren's syndrome (H)        Take 2 tablets for three days (4/12-14), then take 1 tablet daily (10 mg daily starting 4/15)   Quantity:  90 tablet   Refills:  3         CONTINUE these medicines which have NOT CHANGED        Dose / Directions    acyclovir 400 MG tablet   Commonly known as:  ZOVIRAX        Dose:  400 mg   Take 400 mg by mouth See Admin Instructions 5 times daily as needed for outbreaks   Refills:  0       albuterol 108 (90 BASE)  MCG/ACT Inhaler   Commonly known as:  PROAIR HFA/PROVENTIL HFA/VENTOLIN HFA   Used for:  ILD (interstitial lung disease) (H)        Dose:  2 puff   Inhale 2 puffs into the lungs every 6 hours   Quantity:  1 Inhaler   Refills:  3       azithromycin 250 MG tablet   Commonly known as:  ZITHROMAX   Used for:  Follicular bronchiolitis (H)        Dose:  250 mg   Take 1 tablet (250 mg) by mouth daily   Quantity:  30 tablet   Refills:  11       COMPRESSION STOCKINGS   Used for:  DVT (deep venous thrombosis), right, Postphlebitic syndrome, Chronic anticoagulation, Atrial fibrillation (H)        Wear compression stockings in affected leg (right leg) or both legs most of the time during the day and take them off at night.   Quantity:  2 each   Refills:  2       fluticasone 220 MCG/ACT Inhaler   Commonly known as:  FLOVENT HFA   Used for:  ILD (interstitial lung disease) (H), Follicular bronchiolitis (H)        Dose:  2 puff   Inhale 2 puffs into the lungs 2 times daily   Quantity:  3 Inhaler   Refills:  3       furosemide 40 MG tablet   Commonly known as:  LASIX   Used for:  Acute edema of lung (H)        Dose:  40 mg   Take 1 tablet (40 mg) by mouth 2 times daily   Quantity:  180 tablet   Refills:  3       ketoconazole 2 % cream   Commonly known as:  NIZORAL   Used for:  Cutaneous candidiasis        Apply topically 2 times daily   Quantity:  60 g   Refills:  1       metFORMIN 500 MG 24 hr tablet   Commonly known as:  GLUCOPHAGE-XR   Used for:  Type 2 diabetes mellitus without complication, without long-term current use of insulin (H)        Dose:  1000 mg   Take 2 tablets (1,000 mg) by mouth daily (with dinner)   Quantity:  180 tablet   Refills:  0       metoprolol 100 MG 24 hr tablet   Commonly known as:  TOPROL-XL   Used for:  Atrial fibrillation with controlled ventricular response (H)        Dose:  100 mg   Take 1 tablet (100 mg) by mouth daily   Quantity:  90 tablet   Refills:  3       montelukast 10 MG tablet   Commonly  known as:  SINGULAIR   Used for:  Sjogren's syndrome (H), ILD (interstitial lung disease) (H)        Dose:  10 mg   Take 1 tablet (10 mg) by mouth At Bedtime   Quantity:  90 tablet   Refills:  1       * order for DME   Used for:  Hypoxia, ILD (interstitial lung disease) (H)        Oxygen: Patient requires supplemental Oxygen 4 LPM via nasal canula with activity. Please provide patient with a home unit and with portability capability. Oxygen will be for a lifetime.   Quantity:  1 Device   Refills:  0       * order for DME   Used for:  Hypoxia, ILD (interstitial lung disease) (H)        Oxygen: Patient requires supplemental Oxygen 4 LPM via nasal canula with activity. Please provide patient with POC to improve mobility, okay to titrate for conserving to keep stats above 90%. Please fax results to [phone].  Oxygen will be for a lifetime.   Quantity:  1 Device   Refills:  0       pantoprazole 20 MG EC tablet   Commonly known as:  PROTONIX   Used for:  Gastroesophageal reflux disease without esophagitis        Dose:  20-40 mg   Take 1-2 tablets (20-40 mg) by mouth daily   Quantity:  60 tablet   Refills:  1       polyethylene glycol Packet   Commonly known as:  MIRALAX/GLYCOLAX   Used for:  Constipation, unspecified constipation type        Dose:  17 g   Take 17 g by mouth daily as needed for constipation   Quantity:  7 packet   Refills:  0       spironolactone 25 MG tablet   Commonly known as:  ALDACTONE   Used for:  Chronic diastolic congestive heart failure (H)        Dose:  25 mg   Take 1 tablet (25 mg) by mouth daily   Quantity:  90 tablet   Refills:  3       traZODone 50 MG tablet   Commonly known as:  DESYREL   Used for:  Insomnia due to medical condition        TAKE 1/2-1 TABLET BY MOUTH NIGHTLY AS NEEDED, CAN TITRATE DOWN TO 1/2TAB OR UP TO 2TABS AS NEEDED   Quantity:  60 tablet   Refills:  3       TYLENOL 500 MG tablet   Used for:  Dizziness   Generic drug:  acetaminophen        Dose:  500 mg   Take 500 mg  by mouth nightly as needed   Refills:  0       warfarin 7.5 MG tablet   Commonly known as:  COUMADIN   Used for:  Atrial fibrillation with controlled ventricular response (H)        Current dose is 7.5 mg daily.  Dose adjusted per INR result.   Quantity:  100 tablet   Refills:  3       * Notice:  This list has 2 medication(s) that are the same as other medications prescribed for you. Read the directions carefully, and ask your doctor or other care provider to review them with you.         Where to get your medicines      These medications were sent to [facility] - [place], [place] - [address]  [address] [address]     Phone:  [phone]     atorvastatin 40 MG tablet    guaiFENesin-dextromethorphan 100-10 MG/5ML syrup    insulin glargine 100 UNIT/ML injection    lisinopril 2.5 MG tablet    potassium chloride SA 20 MEQ CR tablet    predniSONE 10 MG tablet         Some of these will need a paper prescription and others can be bought over the counter. Ask your nurse if you have questions.     Bring a paper prescription for each of these medications     insulin aspart 100 UNIT/ML injection       You don't need a prescription for these medications     lactobacillus rhamnosus (GG) capsule    levofloxacin 500 MG tablet                Protect others around you: Learn how to safely use, store and throw away your medicines at www.disposemymeds.org.             Medication List: This is a list of all your medications and when to take them. Check marks below indicate your daily home schedule. Keep this list as a reference.      Medications           Morning Afternoon Evening Bedtime As Needed    acyclovir 400 MG tablet   Commonly known as:  ZOVIRAX   Take 400 mg by mouth See Admin Instructions 5 times daily as needed for outbreaks                                acyclovir 5 % ointment   Commonly known as:  ZOVIRAX   Apply topically 6 times daily                                 albuterol 108 (90 BASE) MCG/ACT Inhaler   Commonly known as:  PROAIR HFA/PROVENTIL HFA/VENTOLIN HFA   Inhale 2 puffs into the lungs every 6 hours                                atorvastatin 40 MG tablet   Commonly known as:  LIPITOR   Take 1 tablet (40 mg) by mouth daily   Last time this was given:  40 mg on 4/11/2017  8:42 AM                                azithromycin 250 MG tablet   Commonly known as:  ZITHROMAX   Take 1 tablet (250 mg) by mouth daily   Last time this was given:  250 mg on 4/6/2017 12:39 AM                                COMPRESSION STOCKINGS   Wear compression stockings in affected leg (right leg) or both legs most of the time during the day and take them off at night.                                fluticasone 220 MCG/ACT Inhaler   Commonly known as:  FLOVENT HFA   Inhale 2 puffs into the lungs 2 times daily                                fluticasone 50 MCG/ACT spray   Commonly known as:  FLONASE   Spray 1-2 sprays into both nostrils daily                                furosemide 40 MG tablet   Commonly known as:  LASIX   Take 1 tablet (40 mg) by mouth 2 times daily   Last time this was given:  40 mg on 4/11/2017  3:46 PM                                guaiFENesin-dextromethorphan 100-10 MG/5ML syrup   Commonly known as:  ROBITUSSIN DM   Take 10 mLs by mouth every 4 hours as needed for cough   Last time this was given:  10 mLs on 4/10/2017 12:12 PM                                insulin aspart 100 UNIT/ML injection   Commonly known as:  NovoLOG PEN   Inject 10 Units Subcutaneous 3 times daily (with meals)   Last time this was given:  10 Units on 4/11/2017 12:58 PM                                insulin glargine 100 UNIT/ML injection   Commonly known as:  LANTUS   Inject 23 Units Subcutaneous every morning (before breakfast)   Last time this was given:  23 Units on 4/11/2017  8:29 AM                                ketoconazole 2 % cream   Commonly known as:  NIZORAL   Apply topically 2 times  daily   Last time this was given:  4/10/2017  8:35 PM                                lactobacillus rhamnosus (GG) capsule   Take 1 capsule by mouth 3 times daily (before meals)   Last time this was given:  1 capsule on 4/11/2017 12:49 PM                                levofloxacin 500 MG tablet   Commonly known as:  LEVAQUIN   Take 1 tablet (500 mg) by mouth daily   Start taking on:  4/12/2017   Last time this was given:  750 mg on 4/11/2017  8:43 AM                                lisinopril 2.5 MG tablet   Commonly known as:  PRINIVIL/Zestril   Take 1 tablet (2.5 mg) by mouth daily   Last time this was given:  2.5 mg on 4/11/2017  8:43 AM                                metFORMIN 500 MG 24 hr tablet   Commonly known as:  GLUCOPHAGE-XR   Take 2 tablets (1,000 mg) by mouth daily (with dinner)   Last time this was given:  1,000 mg on 4/10/2017  6:15 PM                                metoprolol 100 MG 24 hr tablet   Commonly known as:  TOPROL-XL   Take 1 tablet (100 mg) by mouth daily   Last time this was given:  100 mg on 4/11/2017  8:42 AM                                montelukast 10 MG tablet   Commonly known as:  SINGULAIR   Take 1 tablet (10 mg) by mouth At Bedtime   Last time this was given:  10 mg on 4/10/2017  8:36 PM                                * order for DME   Oxygen: Patient requires supplemental Oxygen 4 LPM via nasal canula with activity. Please provide patient with a home unit and with portability capability. Oxygen will be for a lifetime.                                * order for DME   Oxygen: Patient requires supplemental Oxygen 4 LPM via nasal canula with activity. Please provide patient with POC to improve mobility, okay to titrate for conserving to keep stats above 90%. Please fax results to [phone].  Oxygen will be for a lifetime.                                pantoprazole 20 MG EC tablet   Commonly known as:  PROTONIX   Take 1-2 tablets (20-40 mg) by mouth daily   Last time this was  given:  40 mg on 4/11/2017  8:43 AM                                PARoxetine 10 MG tablet   Commonly known as:  PAXIL   TAKE 1 TABLET (10mg) BY MOUTH AT BEDTIME   Last time this was given:  10 mg on 4/10/2017  8:36 PM                                polyethylene glycol Packet   Commonly known as:  MIRALAX/GLYCOLAX   Take 17 g by mouth daily as needed for constipation                                potassium chloride SA 20 MEQ CR tablet   Commonly known as:  K-DUR/KLOR-CON M   Take 1 tablet (20 mEq) by mouth daily   Last time this was given:  20 mEq on 4/11/2017  8:43 AM                                predniSONE 10 MG tablet   Commonly known as:  DELTASONE   Take 2 tablets for three days (4/12-14), then take 1 tablet daily (10 mg daily starting 4/15)   Last time this was given:  30 mg on 4/11/2017  8:41 AM                                spironolactone 25 MG tablet   Commonly known as:  ALDACTONE   Take 1 tablet (25 mg) by mouth daily   Last time this was given:  25 mg on 4/11/2017  8:44 AM                                traZODone 50 MG tablet   Commonly known as:  DESYREL   TAKE 1/2-1 TABLET BY MOUTH NIGHTLY AS NEEDED, CAN TITRATE DOWN TO 1/2TAB OR UP TO 2TABS AS NEEDED   Last time this was given:  50 mg on 4/8/2017 12:40 AM                                TYLENOL 500 MG tablet   Take 500 mg by mouth nightly as needed   Last time this was given:  500 mg on 4/8/2017  3:00 PM   Generic drug:  acetaminophen                                warfarin 7.5 MG tablet   Commonly known as:  COUMADIN   Current dose is 7.5 mg daily.  Dose adjusted per INR result.   Last time this was given:  6 mg on 4/10/2017  6:15 PM                                * Notice:  This list has 2 medication(s) that are the same as other medications prescribed for you. Read the directions carefully, and ask your doctor or other care provider to review them with you.

## 2017-04-02 NOTE — IP AVS SNAPSHOT
` ` Patient Information     Patient Name Sex     Betty Tee ([id number]) Female 1940       Room Bed    6218 5209-15      Patient Demographics     Address Phone E-mail Address    [address]  [facility] [address] [phone] (Home) *Preferred*  [phone] (Mobile) [email or web address]      Patient Ethnicity & Race     Ethnic Group Patient Race    American White      Emergency Contact(s)     Name Relation Home Work Mobile    Nghia Carlson [phone]    Yvette Chaves Other [phone]        Documents on File        Status Date Received Description       Documents for the Patient    Insurance Card  () 04     Face Sheet Received () 09     Insurance Card  () 10/03/05     Insurance Card  () 06     Insurance Card  () 08     Insurance Card  () 09     External Medication Information Consent Accepted () 09     Insurance Card  () 09     Privacy Notice - [place]  09     HUNG Face Sheet Received () 09     Other  09 MEDICARE QUESTIONS    Insurance Card Received () 06/08/10     Patient ID Received () 14     Consent for Services - Hospital/Clinic Received () 11     Consent for Services - Hospital/Clinic Received () 11/03/10     Privacy Notice - [place] Received 11/23/10     Consent for Services - Hospital/Clinic Received () 11     External Medication Information Consent Accepted () 11     Consent for Services - Hospital/Clinic Received () 11     Advance Directives and Living Will Not Received  10/14/11    Advance Directives and Living Will Not Received  [employer] [facility] [employer] Emergency Resuscitation Guidelines, 10/12/11    Other Received 12 [facility] IM for Patient Signature  16     Insurance Card Received () 12     Insurance Card Received ()  12     Consent for Services - Hospital/Clinic Received () 12     Advance Directives and Living Will Not Received  [facility], 10/2/11    External Medication Information Consent Accepted () 12     Consent for Services - [facility] Received 12/10/12 General Consent     HIM ELICIA Authorization - File Only   Patient, 12    Consent for EHR Access  13 Copied from existing Consent for services - C/HOD collected on 2012    [facility] Specified Other       Consent for Services - Hospital/Clinic Received () 13     HIM ELICIA Authorization - File Only   Patient, 5/10/13    External Medication Information Consent Accepted 13     Insurance Card Received () 14     Consent for Services - Hospital/Clinic Received () 14     HIM ELICIA Authorization - File Only   [facility], 14    HIM ELICIA Authorization - File Only   [facility] 2014    Insurance Card Received 04/07/15 Medicare and BCBS Cards    Patient ID Received 04/07/15 mn dl    Consent for Services - Hospital/Clinic Received () 04/28/15     Consent for Services - [facility] Received 07/27/15 Imaging consent    Consent for Services - [facility]  07/28/15 GENERAL CONSENT FORM: SHARED EHR - ENGLISH    HIM ELICIA Authorization  08/19/15 [facility]    HIM ELICIA Authorization - File Only  11/09/15 ELICIA, [facility] TO [facility], 2015    Consent for Services/Privacy Notice - Hospital/Clinic Received () 16     Consent for Services/Privacy Notice - Hospital/Clinic-[facility]sign Received 17     HIM ELICIA Authorization  16 PATIENT    HIM ELICIA Authorization - File Only   ELICIA request    Privacy Notice - [place]  (Deleted) 03     Consent for Services - Hospital/Clinic  (Deleted)      Privacy Notice - [place] Received (Deleted) 11     External Medication Information Consent Accepted (Deleted) 13     Insurance Card  (Deleted) 16 No card today       Documents for the  Encounter    CMS IM for Patient Signature Received 04/02/17     Monitoring Device Output  04/07/17 INITIAL MONITORING STRIPS    Monitoring Device Output  04/11/17 INITIAL MONITORING STRIPS      Admission Information     Attending Provider Admitting Provider Admission Type Admission Date/Time    Lenny Greenwood MD Borchert, Meghann Carolina MD Emergency 04/02/17 2147    Discharge Date Hospital Service Auth/Cert Status Service Area     [facility]    Unit Room/Bed Admission Status       UU U5A 5209/5209-01 Admission (Confirmed)       Admission     Complaint    CAP (community acquired pneumonia)      Hospital Account     Name Acct ID Class Status Primary Coverage    Betty Dahl [id number] Inpatient Open MEDICARE - MEDICARE            Guarantor Account (for Hospital Account #[id number])     Name Relation to Pt Service Area Active? Acct Type    Betty Dahl  FCS Yes Personal/Family    Address Phone          [address] [place] [address] [phone](H)  NONE(O)              Coverage Information (for Hospital Account #[id number])     1. MEDICARE/MEDICARE     F/O Payor/Plan Precert #    MEDICARE/MEDICARE     Subscriber Subscriber #    Betty Dahl [id number]    Address Phone    [employer]  [address]  [place] [address] [phone]          2. [place]     F/O Payor/Plan Precert #    [place]     Subscriber Subscriber #    Betty Dahl [id number]    Address Phone    [address] [address]  [place] [address] [phone]

## 2017-04-02 NOTE — LETTER
Transition Communication Hand-off for Care Transitions to Next Level of Care Provider    Name: Betty Tee  MRN #: [id number]  Primary Care Provider: Ilene Tristan     Primary Clinic: [facility] [address] [address]     Reason for Hospitalization:  Fever, unspecified [R50.9]  Atrial fibrillation with rapid ventricular response (H) [I48.91]  Pneumonia due to infectious organism, unspecified laterality, unspecified part of lung [J18.9]  Acute respiratory failure (H) [J96.00]  Acute respiratory failure with hypoxia (H) [J96.01]  Admit Date/Time: 4/2/2017  9:47 PM  Discharge Date: 4/11/2017  Payor Source: Payor: MEDICARE / Plan: MEDICARE / Product Type: Medicare /     Readmission Assessment Measure (NICOLETTE) Risk Score/category:     Plan of Care Goals/Milestone Events:   Patient Concerns: return to home, diabetes management   Patient Goals:   Short-term rehab   Long-term diabetes management   Medical Goals   Short-term    Long-term          Reason for Communication Hand-off Referral: Other coordination    Discharge Plan:  DC to TCU, then return to home     Concern for non-adherence with plan of care:   Y/N n  Discharge Needs Assessment:  Needs       Most Recent Value    Equipment Currently Used at Home oxygen, other (see comments) [has 4WW but does not use]    Transportation Available car, family or friend will provide    DME [facility] [phone], Fax: [phone]    DME Equipment Ordered respiratory supplies          Already enrolled in Tele-monitoring program and name of program:  unknown  Follow-up specialty is recommended: Yes    Follow-up plan:  Future Appointments  Date Time Provider Department Center   4/12/2017 6:00 AM Yvonne Walter, PT [facility]   6/22/2017 4:30 PM  LAB [facility]   6/22/2017 5:00 PM Radha Rosa MD [facility]   7/5/2017 11:00 AM  [facility]   7/5/2017 11:30 AM Meño Walsh MD [facility]       Any  outstanding tests or procedures:    Procedures     Future Labs/Procedures    Oxygen - Nasal cannula     Comments:    2-3 Lpm by nasal cannula to keep O2 sats 92% or greater.    Oxygen Adult     Comments:    Renew Home Oxygen Order  Renew previous prescription.  Expected treatment length is indefinite (99 months).  ___________________________  [facility]  Phone  [phone]  Fax  [phone]  ___________________________    Attending Provider: Abhishek Elizabeth MD  Physician signature: See electronic signature associated with these discharge orders  Date of Order: April 5, 2017          Referrals     Future Labs/Procedures    Occupational Therapy Adult Consult     Comments:    Evaluate and treat as clinically indicated.    Reason:  Physical deconditioning    Physical Therapy Adult Consult     Comments:    Evaluate and treat as clinically indicated.    Reason:  Physical deconditioning            Key Recommendations:      [place]    AVS/Discharge Summary is the source of truth; this is a helpful guide for improved communication of patient story

## 2017-04-02 NOTE — IP AVS SNAPSHOT
[address] [address]    Phone:  [phone]                                       After Visit Summary   4/2/2017    Betty Tee    MRN: [id number]           After Visit Summary Signature Page     I have received my discharge instructions, and my questions have been answered. I have discussed any challenges I see with this plan with the nurse or doctor.    ..........................................................................................................................................  Patient/Patient Representative Signature      ..........................................................................................................................................  Patient Representative Print Name and Relationship to Patient    ..................................................               ................................................  Date                                            Time    ..........................................................................................................................................  Reviewed by Signature/Title    ...................................................              ..............................................  Date                                                            Time

## 2017-04-03 PROBLEM — J18.9 CAP (COMMUNITY ACQUIRED PNEUMONIA): Status: ACTIVE | Noted: 2017-04-03

## 2017-04-03 LAB
ALBUMIN UR-MCNC: 30 MG/DL
ANION GAP SERPL CALCULATED.3IONS-SCNC: 11 MMOL/L (ref 3–14)
APPEARANCE UR: CLEAR
BACTERIA SPEC CULT: ABNORMAL
BILIRUB UR QL STRIP: NEGATIVE
BUN SERPL-MCNC: 8 MG/DL (ref 7–30)
CALCIUM SERPL-MCNC: 8.5 MG/DL (ref 8.5–10.1)
CHLORIDE SERPL-SCNC: 108 MMOL/L (ref 94–109)
CO2 SERPL-SCNC: 24 MMOL/L (ref 20–32)
COLOR UR AUTO: ABNORMAL
CREAT SERPL-MCNC: 0.65 MG/DL (ref 0.52–1.04)
D DIMER PPP FEU-MCNC: 0.4 UG/ML FEU (ref 0–0.5)
FLUAV RNA SPEC QL NAA+PROBE: ABNORMAL
GFR SERPL CREATININE-BSD FRML MDRD: 88 ML/MIN/1.7M2
GLUCOSE BLDC GLUCOMTR-MCNC: 284 MG/DL (ref 70–99)
GLUCOSE BLDC GLUCOMTR-MCNC: 333 MG/DL (ref 70–99)
GLUCOSE BLDC GLUCOMTR-MCNC: 349 MG/DL (ref 70–99)
GLUCOSE BLDC GLUCOMTR-MCNC: 406 MG/DL (ref 70–99)
GLUCOSE SERPL-MCNC: 311 MG/DL (ref 70–99)
GLUCOSE UR STRIP-MCNC: >1000 MG/DL
GRAM STN SPEC: ABNORMAL
GRAM STN SPEC: NORMAL
HGB UR QL STRIP: NEGATIVE
INR PPP: 1.72 (ref 0.86–1.14)
KETONES UR STRIP-MCNC: 40 MG/DL
LACTATE BLD-SCNC: 1.3 MMOL/L (ref 0.7–2.1)
LEUKOCYTE ESTERASE UR QL STRIP: NEGATIVE
Lab: ABNORMAL
Lab: NORMAL
MAGNESIUM SERPL-MCNC: 2.4 MG/DL (ref 1.6–2.3)
MICRO REPORT STATUS: ABNORMAL
MICRO REPORT STATUS: ABNORMAL
MICRO REPORT STATUS: NORMAL
MUCOUS THREADS #/AREA URNS LPF: PRESENT /LPF
NITRATE UR QL: NEGATIVE
PH UR STRIP: 6.5 PH (ref 5–7)
POTASSIUM BLD-SCNC: 4.4 MMOL/L (ref 3.4–5.3)
POTASSIUM SERPL-SCNC: 3.1 MMOL/L (ref 3.4–5.3)
PROCALCITONIN SERPL-MCNC: NORMAL NG/ML
PROCALCITONIN SERPL-MCNC: NORMAL NG/ML
RBC #/AREA URNS AUTO: 2 /HPF (ref 0–2)
SODIUM SERPL-SCNC: 144 MMOL/L (ref 133–144)
SP GR UR STRIP: 1.04 (ref 1–1.03)
SPECIMEN SOURCE: ABNORMAL
SPECIMEN SOURCE: NORMAL
SQUAMOUS #/AREA URNS AUTO: <1 /HPF (ref 0–1)
URN SPEC COLLECT METH UR: ABNORMAL
UROBILINOGEN UR STRIP-MCNC: NORMAL MG/DL (ref 0–2)
WBC #/AREA URNS AUTO: 1 /HPF (ref 0–2)

## 2017-04-03 PROCEDURE — 87205 SMEAR GRAM STAIN: CPT | Performed by: HOSPITALIST

## 2017-04-03 PROCEDURE — 40000275 ZZH STATISTIC RCP TIME EA 10 MIN: Performed by: OPTOMETRIST

## 2017-04-03 PROCEDURE — 96367 TX/PROPH/DG ADDL SEQ IV INF: CPT | Performed by: EMERGENCY MEDICINE

## 2017-04-03 PROCEDURE — 25000132 ZZH RX MED GY IP 250 OP 250 PS 637: Mod: GY | Performed by: STUDENT IN AN ORGANIZED HEALTH CARE EDUCATION/TRAINING PROGRAM

## 2017-04-03 PROCEDURE — A9270 NON-COVERED ITEM OR SERVICE: HCPCS | Mod: GY | Performed by: HOSPITALIST

## 2017-04-03 PROCEDURE — 80048 BASIC METABOLIC PNL TOTAL CA: CPT | Performed by: STUDENT IN AN ORGANIZED HEALTH CARE EDUCATION/TRAINING PROGRAM

## 2017-04-03 PROCEDURE — 87205 SMEAR GRAM STAIN: CPT | Performed by: STUDENT IN AN ORGANIZED HEALTH CARE EDUCATION/TRAINING PROGRAM

## 2017-04-03 PROCEDURE — 83605 ASSAY OF LACTIC ACID: CPT | Performed by: HOSPITALIST

## 2017-04-03 PROCEDURE — 36415 COLL VENOUS BLD VENIPUNCTURE: CPT | Performed by: FAMILY MEDICINE

## 2017-04-03 PROCEDURE — 25000125 ZZHC RX 250: Performed by: STUDENT IN AN ORGANIZED HEALTH CARE EDUCATION/TRAINING PROGRAM

## 2017-04-03 PROCEDURE — 94640 AIRWAY INHALATION TREATMENT: CPT | Performed by: OPTOMETRIST

## 2017-04-03 PROCEDURE — 94640 AIRWAY INHALATION TREATMENT: CPT

## 2017-04-03 PROCEDURE — 40000274 ZZH STATISTIC RCP CONSULT EA 30 MIN

## 2017-04-03 PROCEDURE — 84132 ASSAY OF SERUM POTASSIUM: CPT | Performed by: HOSPITALIST

## 2017-04-03 PROCEDURE — 85610 PROTHROMBIN TIME: CPT | Performed by: HOSPITALIST

## 2017-04-03 PROCEDURE — 00000146 ZZHCL STATISTIC GLUCOSE BY METER IP

## 2017-04-03 PROCEDURE — 94640 AIRWAY INHALATION TREATMENT: CPT | Mod: 76

## 2017-04-03 PROCEDURE — A9270 NON-COVERED ITEM OR SERVICE: HCPCS | Mod: GY | Performed by: FAMILY MEDICINE

## 2017-04-03 PROCEDURE — 25900017 H RX MED GY IP 259 OP 259 PS 637: Mod: GY | Performed by: STUDENT IN AN ORGANIZED HEALTH CARE EDUCATION/TRAINING PROGRAM

## 2017-04-03 PROCEDURE — 85379 FIBRIN DEGRADATION QUANT: CPT | Performed by: FAMILY MEDICINE

## 2017-04-03 PROCEDURE — 25000131 ZZH RX MED GY IP 250 OP 636 PS 637: Mod: GY | Performed by: STUDENT IN AN ORGANIZED HEALTH CARE EDUCATION/TRAINING PROGRAM

## 2017-04-03 PROCEDURE — 25000132 ZZH RX MED GY IP 250 OP 250 PS 637: Mod: GY | Performed by: FAMILY MEDICINE

## 2017-04-03 PROCEDURE — 25000128 H RX IP 250 OP 636: Performed by: FAMILY MEDICINE

## 2017-04-03 PROCEDURE — 25000128 H RX IP 250 OP 636: Performed by: STUDENT IN AN ORGANIZED HEALTH CARE EDUCATION/TRAINING PROGRAM

## 2017-04-03 PROCEDURE — 87633 RESP VIRUS 12-25 TARGETS: CPT | Performed by: FAMILY MEDICINE

## 2017-04-03 PROCEDURE — 25000128 H RX IP 250 OP 636: Performed by: EMERGENCY MEDICINE

## 2017-04-03 PROCEDURE — 25000131 ZZH RX MED GY IP 250 OP 636 PS 637: Mod: GY | Performed by: FAMILY MEDICINE

## 2017-04-03 PROCEDURE — 36415 COLL VENOUS BLD VENIPUNCTURE: CPT | Performed by: STUDENT IN AN ORGANIZED HEALTH CARE EDUCATION/TRAINING PROGRAM

## 2017-04-03 PROCEDURE — 25000132 ZZH RX MED GY IP 250 OP 250 PS 637: Mod: GY | Performed by: HOSPITALIST

## 2017-04-03 PROCEDURE — 25000125 ZZHC RX 250

## 2017-04-03 PROCEDURE — 87070 CULTURE OTHR SPECIMN AEROBIC: CPT | Performed by: HOSPITALIST

## 2017-04-03 PROCEDURE — 40000275 ZZH STATISTIC RCP TIME EA 10 MIN

## 2017-04-03 PROCEDURE — 84145 PROCALCITONIN (PCT): CPT | Performed by: STUDENT IN AN ORGANIZED HEALTH CARE EDUCATION/TRAINING PROGRAM

## 2017-04-03 PROCEDURE — A9270 NON-COVERED ITEM OR SERVICE: HCPCS | Mod: GY | Performed by: STUDENT IN AN ORGANIZED HEALTH CARE EDUCATION/TRAINING PROGRAM

## 2017-04-03 PROCEDURE — 36415 COLL VENOUS BLD VENIPUNCTURE: CPT | Performed by: HOSPITALIST

## 2017-04-03 PROCEDURE — 83735 ASSAY OF MAGNESIUM: CPT | Performed by: STUDENT IN AN ORGANIZED HEALTH CARE EDUCATION/TRAINING PROGRAM

## 2017-04-03 PROCEDURE — 21400006 ZZH R&B CCU INTERMEDIATE UMMC

## 2017-04-03 RX ORDER — PAROXETINE 10 MG/1
10 TABLET, FILM COATED ORAL AT BEDTIME
Status: DISCONTINUED | OUTPATIENT
Start: 2017-04-03 | End: 2017-04-11 | Stop reason: HOSPADM

## 2017-04-03 RX ORDER — PANTOPRAZOLE SODIUM 20 MG/1
20-40 TABLET, DELAYED RELEASE ORAL DAILY
Status: DISCONTINUED | OUTPATIENT
Start: 2017-04-03 | End: 2017-04-06

## 2017-04-03 RX ORDER — AZITHROMYCIN 250 MG/1
250 TABLET, FILM COATED ORAL EVERY 24 HOURS
Status: DISCONTINUED | OUTPATIENT
Start: 2017-04-04 | End: 2017-04-06

## 2017-04-03 RX ORDER — POTASSIUM CHLORIDE 750 MG/1
20-40 TABLET, EXTENDED RELEASE ORAL
Status: DISCONTINUED | OUTPATIENT
Start: 2017-04-03 | End: 2017-04-10

## 2017-04-03 RX ORDER — ACETAMINOPHEN 325 MG/1
650 TABLET ORAL EVERY 4 HOURS PRN
Status: DISCONTINUED | OUTPATIENT
Start: 2017-04-03 | End: 2017-04-04

## 2017-04-03 RX ORDER — METOPROLOL SUCCINATE 100 MG/1
100 TABLET, EXTENDED RELEASE ORAL DAILY
Status: DISCONTINUED | OUTPATIENT
Start: 2017-04-03 | End: 2017-04-11 | Stop reason: HOSPADM

## 2017-04-03 RX ORDER — POTASSIUM CHLORIDE 7.45 MG/ML
10 INJECTION INTRAVENOUS
Status: DISCONTINUED | OUTPATIENT
Start: 2017-04-03 | End: 2017-04-10

## 2017-04-03 RX ORDER — SPIRONOLACTONE 25 MG/1
25 TABLET ORAL
Status: DISCONTINUED | OUTPATIENT
Start: 2017-04-03 | End: 2017-04-05 | Stop reason: DRUGHIGH

## 2017-04-03 RX ORDER — POTASSIUM CHLORIDE 29.8 MG/ML
20 INJECTION INTRAVENOUS
Status: DISCONTINUED | OUTPATIENT
Start: 2017-04-03 | End: 2017-04-10

## 2017-04-03 RX ORDER — FLUTICASONE PROPIONATE 50 MCG
1-2 SPRAY, SUSPENSION (ML) NASAL DAILY PRN
Status: DISCONTINUED | OUTPATIENT
Start: 2017-04-03 | End: 2017-04-11 | Stop reason: HOSPADM

## 2017-04-03 RX ORDER — LIDOCAINE 40 MG/G
CREAM TOPICAL
Status: DISCONTINUED | OUTPATIENT
Start: 2017-04-03 | End: 2017-04-11 | Stop reason: HOSPADM

## 2017-04-03 RX ORDER — FUROSEMIDE 40 MG
40 TABLET ORAL
Status: DISCONTINUED | OUTPATIENT
Start: 2017-04-03 | End: 2017-04-05 | Stop reason: DRUGHIGH

## 2017-04-03 RX ORDER — ACYCLOVIR 50 MG/G
OINTMENT TOPICAL
Status: CANCELLED | OUTPATIENT
Start: 2017-04-03

## 2017-04-03 RX ORDER — ALBUTEROL SULFATE 0.83 MG/ML
SOLUTION RESPIRATORY (INHALATION)
Status: COMPLETED
Start: 2017-04-03 | End: 2017-04-03

## 2017-04-03 RX ORDER — ACETAMINOPHEN 650 MG/1
650 SUPPOSITORY RECTAL EVERY 4 HOURS PRN
Status: DISCONTINUED | OUTPATIENT
Start: 2017-04-03 | End: 2017-04-04

## 2017-04-03 RX ORDER — POTASSIUM CHLORIDE 1.5 G/1.58G
20-40 POWDER, FOR SOLUTION ORAL
Status: DISCONTINUED | OUTPATIENT
Start: 2017-04-03 | End: 2017-04-10

## 2017-04-03 RX ORDER — METOPROLOL SUCCINATE 100 MG/1
100 TABLET, EXTENDED RELEASE ORAL
Status: DISCONTINUED | OUTPATIENT
Start: 2017-04-03 | End: 2017-04-03

## 2017-04-03 RX ORDER — GUAIFENESIN/DEXTROMETHORPHAN 100-10MG/5
10 SYRUP ORAL EVERY 4 HOURS PRN
Status: DISCONTINUED | OUTPATIENT
Start: 2017-04-03 | End: 2017-04-11 | Stop reason: HOSPADM

## 2017-04-03 RX ORDER — NICOTINE POLACRILEX 4 MG
15-30 LOZENGE BUCCAL
Status: DISCONTINUED | OUTPATIENT
Start: 2017-04-03 | End: 2017-04-07

## 2017-04-03 RX ORDER — IPRATROPIUM BROMIDE AND ALBUTEROL SULFATE 2.5; .5 MG/3ML; MG/3ML
3 SOLUTION RESPIRATORY (INHALATION) 4 TIMES DAILY
Status: DISCONTINUED | OUTPATIENT
Start: 2017-04-03 | End: 2017-04-05

## 2017-04-03 RX ORDER — NALOXONE HYDROCHLORIDE 0.4 MG/ML
.1-.4 INJECTION, SOLUTION INTRAMUSCULAR; INTRAVENOUS; SUBCUTANEOUS
Status: DISCONTINUED | OUTPATIENT
Start: 2017-04-03 | End: 2017-04-11 | Stop reason: HOSPADM

## 2017-04-03 RX ORDER — POLYETHYLENE GLYCOL 3350 17 G/17G
17 POWDER, FOR SOLUTION ORAL DAILY PRN
Status: DISCONTINUED | OUTPATIENT
Start: 2017-04-03 | End: 2017-04-11 | Stop reason: HOSPADM

## 2017-04-03 RX ORDER — NICOTINE POLACRILEX 4 MG
15-30 LOZENGE BUCCAL
Status: DISCONTINUED | OUTPATIENT
Start: 2017-04-03 | End: 2017-04-03

## 2017-04-03 RX ORDER — MONTELUKAST SODIUM 10 MG/1
10 TABLET ORAL AT BEDTIME
Status: DISCONTINUED | OUTPATIENT
Start: 2017-04-03 | End: 2017-04-11 | Stop reason: HOSPADM

## 2017-04-03 RX ORDER — ALBUTEROL SULFATE 0.83 MG/ML
3 SOLUTION RESPIRATORY (INHALATION)
Status: DISCONTINUED | OUTPATIENT
Start: 2017-04-03 | End: 2017-04-11 | Stop reason: HOSPADM

## 2017-04-03 RX ORDER — DEXTROSE MONOHYDRATE 25 G/50ML
25-50 INJECTION, SOLUTION INTRAVENOUS
Status: DISCONTINUED | OUTPATIENT
Start: 2017-04-03 | End: 2017-04-03

## 2017-04-03 RX ORDER — CEFTRIAXONE 2 G/1
2 INJECTION, POWDER, FOR SOLUTION INTRAMUSCULAR; INTRAVENOUS EVERY 24 HOURS
Status: DISCONTINUED | OUTPATIENT
Start: 2017-04-03 | End: 2017-04-06

## 2017-04-03 RX ORDER — KETOCONAZOLE 20 MG/G
CREAM TOPICAL 2 TIMES DAILY
Status: DISCONTINUED | OUTPATIENT
Start: 2017-04-03 | End: 2017-04-11 | Stop reason: HOSPADM

## 2017-04-03 RX ORDER — DEXTROSE MONOHYDRATE 25 G/50ML
25-50 INJECTION, SOLUTION INTRAVENOUS
Status: DISCONTINUED | OUTPATIENT
Start: 2017-04-03 | End: 2017-04-07

## 2017-04-03 RX ORDER — WARFARIN SODIUM 7.5 MG/1
7.5 TABLET ORAL
Status: COMPLETED | OUTPATIENT
Start: 2017-04-03 | End: 2017-04-03

## 2017-04-03 RX ADMIN — WARFARIN SODIUM 7.5 MG: 7.5 TABLET ORAL at 18:33

## 2017-04-03 RX ADMIN — POTASSIUM CHLORIDE 20 MEQ: 750 TABLET, EXTENDED RELEASE ORAL at 13:56

## 2017-04-03 RX ADMIN — CEFTRIAXONE 2 G: 2 INJECTION, POWDER, FOR SOLUTION INTRAMUSCULAR; INTRAVENOUS at 23:40

## 2017-04-03 RX ADMIN — INSULIN ASPART 2 UNITS: 100 INJECTION, SOLUTION INTRAVENOUS; SUBCUTANEOUS at 09:48

## 2017-04-03 RX ADMIN — METOPROLOL SUCCINATE 100 MG: 100 TABLET, FILM COATED, EXTENDED RELEASE ORAL at 08:52

## 2017-04-03 RX ADMIN — INSULIN ASPART 2 UNITS: 100 INJECTION, SOLUTION INTRAVENOUS; SUBCUTANEOUS at 12:25

## 2017-04-03 RX ADMIN — ALBUTEROL SULFATE 2.5 MG: 2.5 SOLUTION RESPIRATORY (INHALATION) at 04:13

## 2017-04-03 RX ADMIN — ENOXAPARIN SODIUM 40 MG: 40 INJECTION SUBCUTANEOUS at 08:52

## 2017-04-03 RX ADMIN — PANTOPRAZOLE SODIUM 20 MG: 20 TABLET, DELAYED RELEASE ORAL at 09:47

## 2017-04-03 RX ADMIN — AZITHROMYCIN MONOHYDRATE 500 MG: 500 INJECTION, POWDER, LYOPHILIZED, FOR SOLUTION INTRAVENOUS at 01:31

## 2017-04-03 RX ADMIN — MONTELUKAST SODIUM 10 MG: 10 TABLET, FILM COATED ORAL at 21:55

## 2017-04-03 RX ADMIN — PAROXETINE HYDROCHLORIDE 10 MG: 10 TABLET, FILM COATED ORAL at 21:56

## 2017-04-03 RX ADMIN — IPRATROPIUM BROMIDE AND ALBUTEROL SULFATE 3 ML: .5; 3 SOLUTION RESPIRATORY (INHALATION) at 12:13

## 2017-04-03 RX ADMIN — IPRATROPIUM BROMIDE AND ALBUTEROL SULFATE 3 ML: .5; 3 SOLUTION RESPIRATORY (INHALATION) at 20:40

## 2017-04-03 RX ADMIN — FLUTICASONE FUROATE 1 PUFF: 200 POWDER RESPIRATORY (INHALATION) at 08:55

## 2017-04-03 RX ADMIN — IPRATROPIUM BROMIDE AND ALBUTEROL SULFATE 3 ML: .5; 3 SOLUTION RESPIRATORY (INHALATION) at 16:47

## 2017-04-03 RX ADMIN — POTASSIUM CHLORIDE 40 MEQ: 750 TABLET, EXTENDED RELEASE ORAL at 10:39

## 2017-04-03 RX ADMIN — ACETAMINOPHEN 650 MG: 325 TABLET, FILM COATED ORAL at 20:44

## 2017-04-03 RX ADMIN — IPRATROPIUM BROMIDE AND ALBUTEROL SULFATE 3 ML: .5; 3 SOLUTION RESPIRATORY (INHALATION) at 08:02

## 2017-04-03 RX ADMIN — INSULIN ASPART 2 UNITS: 100 INJECTION, SOLUTION INTRAVENOUS; SUBCUTANEOUS at 16:27

## 2017-04-03 RX ADMIN — INSULIN GLARGINE 10 UNITS: 100 INJECTION, SOLUTION SUBCUTANEOUS at 16:26

## 2017-04-03 RX ADMIN — PREDNISONE 30 MG: 20 TABLET ORAL at 08:52

## 2017-04-03 ASSESSMENT — ACTIVITIES OF DAILY LIVING (ADL)
TOILETING: 0-->INDEPENDENT
NUMBER_OF_TIMES_PATIENT_HAS_FALLEN_WITHIN_LAST_SIX_MONTHS: 1
SWALLOWING: 0-->SWALLOWS FOODS/LIQUIDS WITHOUT DIFFICULTY
COGNITION: 0 - NO COGNITION ISSUES REPORTED
RETIRED_EATING: 0-->INDEPENDENT
RETIRED_COMMUNICATION: 0-->UNDERSTANDS/COMMUNICATES WITHOUT DIFFICULTY
WHICH_OF_THE_ABOVE_FUNCTIONAL_RISKS_HAD_A_RECENT_ONSET_OR_CHANGE?: FALL HISTORY
AMBULATION: 0-->INDEPENDENT
BATHING: 0-->INDEPENDENT
FALL_HISTORY_WITHIN_LAST_SIX_MONTHS: YES
DRESS: 0-->INDEPENDENT
TRANSFERRING: 0-->INDEPENDENT

## 2017-04-03 NOTE — ED NOTES
Pt BIBA after neighbor was concerned about her after she didn't get out of bed earlier and she went and checked on her. Stated she was acting abnormally. Hx of COPD, CHF, A-fib, diabetes. EMS activated STEMI protocol after finding abnormal EKJ changes on her 12 lead. . /84. . No neuro deficits noted. Denies chest pain. ED 12 lead EKG shows A-fib w/ RVR.

## 2017-04-03 NOTE — ED PROVIDER NOTES
Addendum:  An Echo was ordered and is pending at this time.  MD Fred Matson Alda L, MD  04/03/17 0008

## 2017-04-03 NOTE — PLAN OF CARE
Stroke Code called.  Arrive in ED at 2200. Received report from Betty Gallagher RN Regarding Patient arriving for Stroke.  Patient's neuro intact. Patient was transferred to CT at 2210. Full Consciousness, O X 4. Spontaneous, responsive, normal speech, denies pain, Motor in All extremities active, full resistance, denied any numbness or tingling.  Returned to ED from CT by 2220.  Vital signs stable.

## 2017-04-03 NOTE — PHARMACY
Pharmacy Stroke Code Response  Pharmacist responded as part of the Stroke Code Team activation to patient care area ED.  The Stroke Team determined that the patient was not a candidate for IV alteplase therapy and the pharmacy team was dismissed at 10:22 pm.

## 2017-04-03 NOTE — PLAN OF CARE
Problem: Pneumonia (Adult)  Goal: Signs and Symptoms of Listed Potential Problems Will be Absent or Manageable (Pneumonia)  Signs and symptoms of listed potential problems will be absent or manageable by discharge/transition of care (reference Pneumonia (Adult) CPG).  Outcome: No Change  Patient has productive cough. Co of a headache. Was running a fever this am. Lung sounds exp wheezes.. Will continue with poc

## 2017-04-03 NOTE — PROGRESS NOTES
[facility]  Neurology Progress Note  04/03/17    Patient Name: Betty Tee    Interval events:  Pt with known A Fib noted to be in A Fib overnight but asymptomatic and rate controlled. Otherwise GRACE.     Objective:  B/P: 150/89, T: 100.4, P: 97, R: 32    GENERAL: NAD, lying on bed, sleeping   CARDIAC: Pt declined exam  RESPIRATORY: Chest expands symmetrically  ABDOMINAL: Pt declined exam  EXTREMITIES: Warm, dry.     NEUROLOGIC:  Patient declined neurologic exam.  Alert, interactive, fluent language, nondysarthric speech.     Assessment & Plan:  77 yo f former smoker with h/o T2DM, a fib with RVR (on warfarin), HTN, and HLD presents for brief episode of delayed response in conversation. The patient reports that she just feels tired. NIHSS 0. CT/CTA without acute abnormalities. No current or h/o localizing symptoms to suggest cerebrovascular pathology. Neurology will sign off at this time.     Patient was seen and discussed with attending doctor, Dr. Arroyo.     Meghana Davis, DO  Neurology PGY1  Pager: [phone]

## 2017-04-03 NOTE — H&P
Please see day team addendum at the bottom    [facility]  Inpatient History and Physical    Betty Tee MRN# [id number]   Age: 76 year old YOB: 1940     4/3/2017 12:50 AM    Primary care provider: Ilene Tristan            Chief Concern:   Dizziness, and concern for AMS       History is obtained from the patient, and roommate in the room.          History of Present Illness (Resident / Clinician):   Betty Tee is a 76 year old female with a history of Sjogren's Syndrome, interstitial lung disease, DM type II, HFpEF, DVT on Coumadin, left ventricular hypertrophy, afib on Metoprolol, and HTN who presents to the ED after her niece call EMS due to concern for AMS, and dizziness.    Brianna is a [employer]. Her roommate was concern after she got home and found her in the bathroom with all the lights of the house off which is not their usual routine. Her roommate noticed that she was not speaking normally, with delayed response in conversation. Denies slurred/garbled speech. Her roommate call her neighbor, and her niece who also noticed changes in her speech reaction, and decided to call an ambulance.   Also, pt mentioned that she had been feeling more tired, and sleepy more than usual lately, especially yesterday to the point that she couldn't participated at her commitments for the day.  Few days ago she was in contact with a friend who was coughing, and feeling body aches, and believes that she may gotten from her.  She reports significant persistent cough, productive of whitish sputum for the last few days which occasionally caused her to vomiting a couple of times. Today she expended most of her day in bed, was also feeling dizzy, and diffuse upper abdominal pain associated with cough. She didn't exactly remember why she was at the bathroom with the lights off.     Denies any loss of speech comprehension, weakness, numbness,  dyarthria, vision changes, chest pain, or headache. Didn't check her temp at home, but had been experiencing chills, and sweating. Her temp in the ED was 102.6F.     EMS did a EKG that showed RVR, which triggered STEMI alert. Cardiology was consulted by ED physician, MI ruled out and Echo was ordered.  Stroke team also consulted, head CT/CTA without acute abnormalities. No concerns for cerebrovascular pathology.    In ED:  azithromycin (ZITHROMAX) 500 mg IV given   metoprolol (LOPRESSOR) injection 5 mg (5 mg Intravenous Given 4/2/17 2154)   0.9% sodium chloride BOLUS    acetaminophen (TYLENOL) tablet 1,000 mg (1,000 mg Oral Given 4/2/17 2251)   cefTRIAXone (ROCEPHIN) 2 g IV given     Old records were reviewed, and any additions to pertinent history are noted above.          Assessment and Plan:   Assessment:   Betty Tee is a 76 year old who presents for evaluation of persistent cough, dizziness, and concerns for AMS now resolved; and found to have a possible resp infection on CXR.  Patient Active Problem List   Diagnosis     Temporomandibular joint disorder     Diverticulitis of colon     Disorder of bone and cartilage     Osteoarthritis     Alcohol abuse, in remission     Restless legs syndrome (RLS)     Major depressive disorder, recurrent episode, moderate     Essential hypertension with goal blood pressure less than 140/90     CARDIOVASCULAR SCREENING; LDL GOAL LESS THAN 130     Sciatica     Advanced directives, counseling/discussion     Colouterine fistula     Atrial fibrillation with controlled ventricular response (H)     Left ventricular hypertrophy     Health Care Home     Insomnia     Joint pains     Allergic reaction caused by a drug - likely plaquenil     Breast fibroadenoma     DVT (deep venous thrombosis) (H)     Fatty liver disease, nonalcoholic     Rib pain     Neck mass     Gastroesophageal reflux disease without esophagitis     Enlarged lymph node     Sjogren's syndrome (H)     ANGELICA  (obstructive sleep apnea)     ILD (interstitial lung disease) (H)     Lower GI bleed     Acute and chronic respiratory failure with hypoxia (H)     Acute edema of lung (H)     (HFpEF) heart failure with preserved ejection fraction (H)     Type 2 diabetes mellitus without complication, without long-term current use of insulin (H)         Plan:   ## SIRS- meet criteria on admission (fever, tachycardia), now resolved.  ## Acute hypoxic respiratory failure- At home-4 LPM with activity only, now on 2 LPM at rest.  ## ILD- (Sjogren's associated ILD and bronchiolitis obliterans. Also had lung nodules) Follows up w/ Dr. Walsh- Last seen on 3/22/17- Stable.  ## CAP- Recent increase of cough, malaise, and CXR showing mild streaky opacities in the right lung base of infection or atelectasis.  - Stared on Azithromycin, and Ceftriaxone.  - Cont. O2, now at 2 LPM   - Prednisone increased to 30 mg/day (stress dose)- (Home dose: 10 mg daily)  - Cont home montelukast  - Cont home Flovent  - Duoneb   - Albuterol neb prn    ## Dizziness/AMS- resolved  Likely due to infection, vs A Fib w/ RVR. MI, and stroke ruled out in ED. Pt also has Hx of alcohol abuse, but had been in remission for 30 years. Unlikely medications.  - Will monitor    ## A Fib.  ## HFpEF  ## HTN   RVR w/ , ST elevation in aVR, ST depressions in V4-V6 on admission EKG (no significant changes from previous), eliz troponin x2.  Good response to B-blocker given in ED.  - On Tele  - Pharmacy to dose warfarin (INR on admission 1.79)  - Holding furosemide, and spironolactone due to soft BPs on admission.   - Cont metoprolol 100 mg daily.  - Will monitor    ## Diabetes Mellitus- uncontrolled  Hgb A1c 11.6 on 4/2/17    Home regimen:  - Metformin 1000 mg daily    Hosp regimen:  - holding metformin  - On LSI   - Will monitor BG, and adjust treatment as needed.    Daily cares -   F:oral  E:K replacement per protocol.  N:regular diet  Lines:PIV  Activity:-Up as  tolerated  CODE:Full Code  Prophylaxis:On warfarin, and Lovenox (prophylactic)  PCP communication:  - Ilene Tristan    Dispo: Expected discharge date 2-3 days pending on clinical improvement.      Discussed with faculty but not examined by faculty.           Past Medical History:     Past Medical History:   Diagnosis Date     Alcohol abuse, in remission      Allergic rhinitis, cause unspecified     allegra helps when she takes it     Antiplatelet or antithrombotic long-term use      Atrial fibrillation (H)     in hosp in 11/11 after surgery w/ fluid overload     Cardiomegaly     LVH on stress echo- cardiac w/u at [facility]- neg CT scan for PE, neg stress echo in 8/06     Chest pain, unspecified      Disorder of bone and cartilage, unspecified     osteopenia (had been on prempro), improved on 6/06 dexa, stable dexa 11/10     Diverticulosis of colon (without mention of hemorrhage)     last episode yrs ago     Essential hypertension, benign      Gastro-oesophageal reflux disease      Insomnia, unspecified     weaned off clonazepam     Irregular heart beat      Lumbago 7/09    MRI with DJD, now seeing Dr. Cain for sciatic sx's     Major depressive disorder, recurrent episode, moderate (H)      Obstructive sleep apnea      Osteoarthrosis, unspecified whether generalized or localized, unspecified site      Sjogren's syndrome (H)      Sleep apnea      Tobacco use disorder     chantix in 9/07, started again in 6/08, working             Past Surgical History:     Past Surgical History:   Procedure Laterality Date     BACK SURGERY  1962     BIOPSY BREAST  9/27/02    Biopsy Left Breast     C APPENDECTOMY  1970's?     C NONSPECIFIC PROCEDURE  11/05    exploratory abd lap, adhesions, resolved RLQ pain, diverticulitis episodes     CARDIAC SURGERY       CHOLECYSTECTOMY  1990's?     COLECTOMY LEFT  11/7/2011    Procedure:COLECTOMY LEFT; Laparoscopic mobilization of splenic flexture, sigmoid colectomy, coloprotoscopy, loop  illeostomy; Surgeon:CK CASTANEDA; Location:[facility] OR     HYSTERECTOMY TOTAL ABDOMINAL, BILATERAL SALPINGO-OOPHORECTOMY, COMBINED  11/7/2011    Procedure:COMBINED HYSTERECTOMY TOTAL ABDOMINAL, BILATERAL SALPINGO-OOPHORECTOMY; total abdominal hysterectomy, bilateral salpingo-oophorectomy; Surgeon:ALETA MANUEL; Location:[facility] OR     INSERT STENT URETER  11/7/2011    Procedure:INSERT STENT URETER; Placement of Bilateral Ureteral Stents ; Surgeon:PRANEETH BRYANT; Location:[facility] OR     SIGMOIDOSCOPY FLEXIBLE  11/3/2011    Procedure:SIGMOIDOSCOPY FLEXIBLE; Flexible Sigmoidoscopy; Surgeon:CK CASTANEDA; Location:[facility] OR     TAKEDOWN ILEOSTOMY  2/1/2012    Procedure:TAKEDOWN ILEOSTOMY; Takedown Loop Ileostomy ; Surgeon:CK CASTANEDA; Location:[facility] OR             Social History:   I have reviewed this patient's social history   Social History     Social History     Marital status: Single     Spouse name: N/A     Number of children: 0     Years of education: Ed Spec De     Occupational History     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     Social History Main Topics     Smoking status: Former Smoker     Packs/day: 0.50     Years: 10.00     Types: Cigarettes     Quit date: 8/1/2011     Smokeless tobacco: Never Used      Comment: 1/2 ppd     Alcohol use No      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     Drug use: No     Sexual activity: No     Other Topics Concern     Not on file     Social History Narrative    Social Documentation:        Balanced Diet: YES    Calcium intake: 1-2 per day    Caffeine: 4-5 cups per day    Exercise:  type of activity limited right now due to foot injury    Sunscreen: No    Seatbelts:  Yes    Self Breast Exam:  Yes    Self Testicular Exam: n/a    Physical/Emotional/Sexual Abuse: No    Do you feel safe in your environment? No-pt lives in [facility]        Cholesterol screen up to date: No-will check today    Eye Exam up to date: Yes    Dental Exam up to  date: Yes    Pap smear up to date: Does Not Apply    Mammogram up to date: Yes-10/07    Dexa Scan up to date: Yes-2006    Colonoscopy up to date: Yes-2006    Immunizations up to date: Yes-td 2002    Glucose screen if over 40:  Yes    '09                              Family History:   I have reviewed this patient's family history       Family History   Problem Relation Age of Onset     C.A.D. Mother 63     MI- first at age 63     C.A.D. Sister 52     Minor MI- age 50's     HEART DISEASE Mother      HEART DISEASE Sister      Hypertension Mother      Hypertension Sister      Hypertension Sister      Hypertension Brother      CEREBROVASCULAR DISEASE Mother      Cancer - colorectal Sister 48     Late 40's early 50's     Prostate Cancer Brother 74     Dx'd age 74     Alcohol/Drug Father      Alzheimer Disease Father      GASTROINTESTINAL DISEASE Sister      Diverticulitis     GASTROINTESTINAL DISEASE Brother      Diverticulitis     Lipids Sister      Lipids Sister      Parkinsonism Brother      DIABETES Sister      HEART DISEASE Sister      CHF     CANCER Sister      lung, smoker     Substance Abuse Sister      Substance Abuse Brother      Dementia Father      Asthma Sister      CANCER Sister      Substance Abuse Sister      Substance Abuse Brother      Asthma Sister      Hypertension Father      Breast Cancer Daughter      Prostate Cancer Brother      Hyperlipidemia Mother      Hyperlipidemia Father      Hyperlipidemia Brother           Immunizations:     Immunization History   Administered Date(s) Administered     Influenza (High Dose) 3 valent vaccine 10/11/2013, 10/14/2014, 12/31/2015, 11/11/2016     Influenza (IIV3) 11/24/2003, 10/08/2004, 10/28/2005, 11/01/2006, 09/01/2010, 10/17/2011, 09/21/2012     Pneumococcal (PCV 13) 10/30/2015     Pneumococcal 23 valent 11/03/2010     TD (ADULT, 7+) 01/15/1993, 11/05/2002     TDAP Vaccine (Adacel) 11/03/2010     Zoster vaccine, live 10/05/2009            Allergies:    Augmentin; Codeine; and Phenobarbital          Medications:     No current facility-administered medications on file prior to encounter.   Current Outpatient Prescriptions on File Prior to Encounter:  metFORMIN (GLUCOPHAGE-XR) 500 MG 24 hr tablet Take 2 tablets (1,000 mg) by mouth daily (with dinner)   order for DME Oxygen: Patient requires supplemental Oxygen 4 LPM via nasal canula with activity. Please provide patient with POC to improve mobility, okay to titrate for conserving to keep stats above 90%. Please fax results to [phone].  Oxygen will be for a lifetime.   pantoprazole (PROTONIX) 20 MG EC tablet Take 1-2 tablets (20-40 mg) by mouth daily   PARoxetine (PAXIL) 10 MG tablet TAKE 1 TABLET (10mg) BY MOUTH AT BEDTIME   traZODone (DESYREL) 50 MG tablet TAKE 1/2-1 TABLET BY MOUTH NIGHTLY AS NEEDED, CAN TITRATE DOWN TO 1/2TAB OR UP TO 2TABS AS NEEDED   ketoconazole (NIZORAL) 2 % cream Apply topically 2 times daily   predniSONE (DELTASONE) 10 MG tablet Take 1 tablet (10 mg) by mouth daily   furosemide (LASIX) 40 MG tablet Take 1 tablet (40 mg) by mouth 2 times daily   metoprolol (TOPROL-XL) 100 MG 24 hr tablet Take 1 tablet (100 mg) by mouth daily   spironolactone (ALDACTONE) 25 MG tablet Take 1 tablet (25 mg) by mouth daily   montelukast (SINGULAIR) 10 MG tablet Take 1 tablet (10 mg) by mouth At Bedtime   albuterol (PROAIR HFA, PROVENTIL HFA, VENTOLIN HFA) 108 (90 BASE) MCG/ACT inhaler Inhale 2 puffs into the lungs every 6 hours   order for DME Oxygen: Patient requires supplemental Oxygen 4 LPM via nasal canula with activity. Please provide patient with a home unit and with portability capability. Oxygen will be for a lifetime.   azithromycin (ZITHROMAX) 250 MG tablet Take 1 tablet (250 mg) by mouth daily   polyethylene glycol (MIRALAX/GLYCOLAX) packet Take 17 g by mouth daily as needed for constipation   warfarin (COUMADIN) 7.5 MG tablet Current dose is 7.5 mg daily.  Dose adjusted per INR result.    acyclovir (ZOVIRAX) 5 % ointment Apply topically 6 times daily (Patient taking differently: Apply topically 6 times daily As needed for outbreaks)   fluticasone (FLOVENT HFA) 220 MCG/ACT inhaler Inhale 2 puffs into the lungs 2 times daily   acyclovir (ZOVIRAX) 400 MG tablet Take 400 mg by mouth See Admin Instructions 5 times daily as needed for outbreaks   fluticasone (FLONASE) 50 MCG/ACT nasal spray Spray 1-2 sprays into both nostrils daily (Patient taking differently: Spray 1-2 sprays into both nostrils daily as needed for allergies )   COMPRESSION STOCKINGS Wear compression stockings in affected leg (right leg) or both legs most of the time during the day and take them off at night.   acetaminophen (TYLENOL) 500 MG tablet Take 500 mg by mouth nightly as needed             Review of Systems:   Constitutional: Positive for fatigue. Negative for fever.   Respiratory: Negative for shortness of breath. Positive for cough  Cardiovascular: Negative for chest pain.   Gastrointestinal: Positive for abdominal pain with cough. Negative for N/V/D   Neurological: Positive for dizziness at home. Negative for weakness, vision changes, or headache.   Psychiatric/Behavioral: Negative for confusion.   Skin: Negative for rash or lesions.           Physical Exam:   Vitals were reviewed  Temp: 98.7  F (37.1  C) Temp src: Oral BP: 113/64 Pulse: 97 Heart Rate: 97 Resp: 18 SpO2: 97 % O2 Device: Nasal cannula Oxygen Delivery: 2 LPM   General: Comfortable in bed, NAD  HEENT: normocephalic, atraumatic  Resp: No resp distress. Diffuse wheezing bilaterally. No crackles.  CV: Irregularly irregular. No murmur.   Abdomen: Soft, normal BS, mildly TTP in upper abdomen, no guarding or rebound, no mass.  Back: No CVA tenderness. No deformity.  Neuro: Alert, oriented x3, clear speech. Cranial nerve grossly intact. No motor deficit.   Extremities: peripheral pulses palpable. No edema.   MSK: Normal ROM. TTP bilaterally.  Skin: intact   Psych:  Normal mood and affect.         Data:     Results for orders placed or performed during the hospital encounter of 04/02/17 (from the past 24 hour(s))   CBC with platelets differential   Result Value Ref Range    WBC 6.4 4.0 - 11.0 10e9/L    RBC Count 4.66 3.8 - 5.2 10e12/L    Hemoglobin 9.9 (L) 11.7 - 15.7 g/dL    Hematocrit 34.6 (L) 35.0 - 47.0 %    MCV 74 (L) 78 - 100 fl    MCH 21.2 (L) 26.5 - 33.0 pg    MCHC 28.6 (L) 31.5 - 36.5 g/dL    RDW 18.6 (H) 10.0 - 15.0 %    Platelet Count 218 150 - 450 10e9/L    Diff Method Automated Method     % Neutrophils 71.3 %    % Lymphocytes 14.4 %    % Monocytes 11.9 %    % Eosinophils 1.4 %    % Basophils 0.5 %    % Immature Granulocytes 0.5 %    Nucleated RBCs 0 0 /100    Absolute Neutrophil 4.5 1.6 - 8.3 10e9/L    Absolute Lymphocytes 0.9 0.8 - 5.3 10e9/L    Absolute Monocytes 0.8 0.0 - 1.3 10e9/L    Absolute Eosinophils 0.1 0.0 - 0.7 10e9/L    Absolute Basophils 0.0 0.0 - 0.2 10e9/L    Abs Immature Granulocytes 0.0 0 - 0.4 10e9/L    Absolute Nucleated RBC 0.0    INR   Result Value Ref Range    INR 1.79 (H) 0.86 - 1.14   Comprehensive metabolic panel   Result Value Ref Range    Sodium 143 133 - 144 mmol/L    Potassium 3.3 (L) 3.4 - 5.3 mmol/L    Chloride 108 94 - 109 mmol/L    Carbon Dioxide 23 20 - 32 mmol/L    Anion Gap 12 3 - 14 mmol/L    Glucose 213 (H) 70 - 99 mg/dL    Urea Nitrogen 7 7 - 30 mg/dL    Creatinine 0.58 0.52 - 1.04 mg/dL    GFR Estimate >90  Non  GFR Calc   >60 mL/min/1.7m2    GFR Estimate If Black >90   GFR Calc   >60 mL/min/1.7m2    Calcium 8.2 (L) 8.5 - 10.1 mg/dL    Bilirubin Total 0.6 0.2 - 1.3 mg/dL    Albumin 3.0 (L) 3.4 - 5.0 g/dL    Protein Total 7.0 6.8 - 8.8 g/dL    Alkaline Phosphatase 72 40 - 150 U/L    ALT 19 0 - 50 U/L    AST 29 0 - 45 U/L   Lipase   Result Value Ref Range    Lipase 136 73 - 393 U/L   Lactic acid whole blood   Result Value Ref Range    Lactic Acid 1.6 0.7 - 2.1 mmol/L   Troponin I   Result Value  Ref Range    Troponin I ES 0.020 0.000 - 0.045 ug/L   Blood culture   Result Value Ref Range    Specimen Description Blood Left Hand     Culture Micro No growth after 2 hours     Micro Report Status Pending    Troponin POCT   Result Value Ref Range    Troponin I 0.00 0.00 - 0.10 ug/L   Glucose by meter   Result Value Ref Range    Glucose 228 (H) 70 - 99 mg/dL   ISTAT gases elec ica gluc thad POCT   Result Value Ref Range    Ph Venous 7.46 (H) 7.32 - 7.43 pH    PCO2 Venous 35 (L) 40 - 50 mm Hg    PO2 Venous 33 25 - 47 mm Hg    Bicarbonate Venous 24 21 - 28 mmol/L    O2 Sat Venous 68 %    Sodium 141 133 - 144 mmol/L    Potassium 3.4 3.4 - 5.3 mmol/L    Glucose 234 (H) 70 - 99 mg/dL    Calcium Ionized 4.6 4.4 - 5.2 mg/dL    Hemoglobin 11.6 (L) 11.7 - 15.7 g/dL    Hematocrit - POCT 34 (L) 35.0 - 47.0 %PCV   Chest  XR, 1 view portable    Narrative    EXAM: XR CHEST PORT 1 VW  4/2/2017 9:59 PM      HISTORY: stemi    COMPARISON: CT 3/22/2017, chest x-ray 8/1/2016    FINDINGS: Cardiac silhouette is enlarged, stable. Mild streaky medial  right basilar opacities. No pleural effusion or pneumothorax.       Impression    IMPRESSION: Mild streaky opacities in the right lung base of infection  or atelectasis.    I have personally reviewed the examination and initial interpretation  and I agree with the findings.    HOMAR SHIELDS MD   ISTAT gases lactate thad POCT   Result Value Ref Range    Ph Venous 7.46 (H) 7.32 - 7.43 pH    PCO2 Venous 36 (L) 40 - 50 mm Hg    PO2 Venous 35 25 - 47 mm Hg    Bicarbonate Venous 26 21 - 28 mmol/L    O2 Sat Venous 70 %    Lactic Acid 1.5 0.7 - 2.1 mmol/L   INR point of care   Result Value Ref Range    INR Point of Care 1.3 (H) 0.86 - 1.14   Blood culture   Result Value Ref Range    Specimen Description Blood Right Arm     Culture Micro No growth after 2 hours     Micro Report Status Pending    Creatinine POCT   Result Value Ref Range    Creatinine 0.6 0.52 - 1.04 mg/dL    GFR Estimate >90 >60  mL/min/1.7m2    GFR Estimate If Black >90 >60 mL/min/1.7m2   CT Head Neck Angio w/o & w Contrast    Narrative    1. Head CTA demonstrates no aneurysm or stenosis of the major  intracranial arteries.   2. Neck CTA demonstrates no stenosis of the major cervical arteries.   3. No evidence of intracranial hemorrhage on the noncontrast head CT.   4. Right suprahyoid neck lymphadenopathy not significantly changed  compared to 6/12/2015.     Influenza A/B antigen swab   Result Value Ref Range    Influenza A/B Agn Specimen Nasopharyngeal     Influenza A Negative NEG    Influenza B  NEG     Negative   Test results must be correlated with clinical data. If necessary, results   should be confirmed by a molecular assay or viral culture.     UA reflex to Microscopic and Culture   Result Value Ref Range    Color Urine Light Yellow     Appearance Urine Clear     Glucose Urine >1000 (A) NEG mg/dL    Bilirubin Urine Negative NEG    Ketones Urine 40 (A) NEG mg/dL    Specific Gravity Urine 1.036 (H) 1.003 - 1.035    Blood Urine Negative NEG    pH Urine 6.5 5.0 - 7.0 pH    Protein Albumin Urine 30 (A) NEG mg/dL    Urobilinogen mg/dL Normal 0.0 - 2.0 mg/dL    Nitrite Urine Negative NEG    Leukocyte Esterase Urine Negative NEG    Source Clean catch urine     RBC Urine 2 0 - 2 /HPF    WBC Urine 1 0 - 2 /HPF    Squamous Epithelial /HPF Urine <1 0 - 1 /HPF    Mucous Urine Present (A) NEG /LPF   Urine Culture   Result Value Ref Range    Specimen Description Midstream Urine     Special Requests Specimen received in preservative     Culture Micro Pending     Micro Report Status Pending      *Note: Due to a large number of results and/or encounters for the requested time period, some results have not been displayed. A complete set of results can be found in Results Review.      All cardiac studies reviewed    All imaging studies reviewed      Admitting Physician:Mikey Elizabeth MD    Day team addendum  April 3, 2017    Assessment and  plan:  Betty Tee is a 76 year old female with a history of Sjogren's Syndrome, interstitial lung disease, DM type II, HFpEF, DVT on Coumadin, left ventricular hypertrophy, afib on Metoprolol, and HTN who presents to the ED after her niece call EMS due to concern for AMS, and dizziness. Her AMS resolved and she was found to have Acute hypoxic respiratory failure secondary to CAP.     ## Acute hypoxic respiratory failure- At home-4 LPM with activity only, now on 2 LPM at rest.  ## ILD- (Sjogren's associated ILD and bronchiolitis obliterans. Also had lung nodules) Follows up w/ Dr. Walsh- Last seen on 3/22/17- Stable.  ## CAP  Patient febrile, tachycardic, tachypneic and with increased supplemental oxygen demands. Even though negative Procalcitonin and no leukocytosis, patient acutely ill and toxic appearing. Recent increase of cough, malaise, and CXR showing mild streaky opacities in the right lung base of infection or atelectasis. Plan to start empiric treatment for CAP with ceftriaxone and azithromycin. Wells criteria for PE puts the patient at low risk for PE (1.5 points). Will proceed with a d-dimer.     - Stared on Azithromycin, and Ceftriaxone (started 04/02)  - Cont. O2, now at 2 LPM   - Prednisone increased to 30 mg/day (stress dose)- (Home dose: 10 mg daily)  - Cont home montelukast  - Cont home Flovent  - Duoneb   - Albuterol neb prn  - Incentive spirometry  - Consider CT with contrast to rule out PE if d-dimer positive.    ## Diabetes Mellitus- uncontrolled  Hgb A1c 11.6 on 4/2/17    Home regimen:  - Metformin 1000 mg daily    Hosp regimen:  - holding metformin  - Basal: Lantus 10 units QAM  - Correction: LSSI    Patient discussed and examined by faculty.    Sandra Jones MD MPH  PGY2- [facility], Family Medicine  Pager- [phone]

## 2017-04-03 NOTE — CONSULTS
[facility], [place]      Neurology Stroke Consult    Patient Name: Betty Tee  : 1940 MRN#: [id number]    STROKE DATA    Stroke Code:  Time called:  2017  Time patient seen:  2017  Onset of symptoms:  2017  Last known normal (pt's baseline):  2017  Head CT read by Dr Reagan at:  2017    TPA treatment:  Not given due to minor / isolated / quickly resolving stroke symptoms.     National Institutes [employer] Stroke Scale (at presentation)  NIHSS done at:  time patient seen      Score    Level of consciousness:  (0)   Alert, keenly responsive     LOC questions:  (0)   Answers both questions correctly    LOC commands:  (0)   Performs both tasks correctly    Best gaze:  (0)   Normal    Visual:  (0)   No visual loss    Facial palsy:  (0)   Normal symmetrical movements    Motor arm (left):  (0)   No drift    Motor arm (right):  (0)   No drift    Motor leg (left):  (0)   No drift    Motor leg (right):  (0)   No drift    Limb ataxia:  (0)   Absent    Sensory:  (0)   Normal- no sensory loss    Best language:  (0)   Normal- no aphasia    Dysarthria:  (0)   Normal    Extinction and inattention:  (0)   No abnormality        NIHSS Total Score:  0        Dysphagia Screen  Time of screenin2017  Screening results: Passed screening, no dysarthria - Regular Diet with thin liquids     ASSESSMENT & RECOMMENDATIONS     Impression:  75 yo f former smoker with h/o T2DM, a fib with RVR (on warfarin), HTN, and HLD presents for brief episode of delayed response in conversation. The patient reports that she just feels tired. NIHSS 0. CT/CTA without acute abnormalities. No current or h/o localizing symptoms to suggest cerebrovascular pathology.     Recommendations:   -no further stroke w/u necessary    HPI  Betty Tee is a 76 year old female with h/o T2DM, HTN, HLD, and a fib (on warfarin) presents for brief episode of  delayed response in conversation. The patient adamantly denies anything is wrong and instead reports that she just feels tired. Her roommate confirms that there was just a brief episode of delayed response in conversation but no slurred/garbled speech. The patient denies any loss of speech comprehension, weakness, numbness, clumsiness, dyarthria, vision changes, or headache. She denies any recent illness, though her fever in the ED was measured to be >102F.      Pertinent Past Medical/Surgical History  Past Medical History:   Diagnosis Date     Alcohol abuse, in remission      Allergic rhinitis, cause unspecified     allegra helps when she takes it     Antiplatelet or antithrombotic long-term use      Atrial fibrillation (H)     in hosp in 11/11 after surgery w/ fluid overload     Cardiomegaly     LVH on stress echo- cardiac w/u at [facility] ER- neg CT scan for PE, neg stress echo in 8/06     Chest pain, unspecified      Disorder of bone and cartilage, unspecified     osteopenia (had been on prempro), improved on 6/06 dexa, stable dexa 11/10     Diverticulosis of colon (without mention of hemorrhage)     last episode yrs ago     Essential hypertension, benign      Gastro-oesophageal reflux disease      Insomnia, unspecified     weaned off clonazepam     Irregular heart beat      Lumbago 7/09    MRI with DJD, now seeing Dr. Cain for sciatic sx's     Major depressive disorder, recurrent episode, moderate (H)      Obstructive sleep apnea      Osteoarthrosis, unspecified whether generalized or localized, unspecified site      Sjogren's syndrome (H)      Sleep apnea      Tobacco use disorder     chantix in 9/07, started again in 6/08, working       Past Surgical History:   Procedure Laterality Date     BACK SURGERY  1962     BIOPSY BREAST  9/27/02    Biopsy Left Breast     C APPENDECTOMY  1970's?     C NONSPECIFIC PROCEDURE  11/05    exploratory abd lap, adhesions, resolved RLQ pain, diverticulitis episodes     CARDIAC  SURGERY       CHOLECYSTECTOMY  1990's?     COLECTOMY LEFT  11/7/2011    Procedure:COLECTOMY LEFT; Laparoscopic mobilization of splenic flexture, sigmoid colectomy, coloprotoscopy, loop illeostomy; Surgeon:CK CASTANEDA; Location:[facility] OR     HYSTERECTOMY TOTAL ABDOMINAL, BILATERAL SALPINGO-OOPHORECTOMY, COMBINED  11/7/2011    Procedure:COMBINED HYSTERECTOMY TOTAL ABDOMINAL, BILATERAL SALPINGO-OOPHORECTOMY; total abdominal hysterectomy, bilateral salpingo-oophorectomy; Surgeon:ALETA MANUEL; Location:[facility] OR     INSERT STENT URETER  11/7/2011    Procedure:INSERT STENT URETER; Placement of Bilateral Ureteral Stents ; Surgeon:PRANEETH BRYANT; Location:[facility]     SIGMOIDOSCOPY FLEXIBLE  11/3/2011    Procedure:SIGMOIDOSCOPY FLEXIBLE; Flexible Sigmoidoscopy; Surgeon:CK CASTANEDA; Location:[facility]     TAKEDOWN ILEOSTOMY  2/1/2012    Procedure:TAKEDOWN ILEOSTOMY; Takedown Loop Ileostomy ; Surgeon:CK CASTANEDA; Location:[facility] OR       Medications: I have reviewed this patient's current medications.    Allergies: All allergies reviewed and addressed.    Family History: I have reviewed this patient's family history.    Social History: I have reviewed this patient's social history.    Tobacco use: Former smoker    ROS:  The 10 point Review of Systems is negative other than noted in the HPI or here.     PHYSICAL EXAMINATION  Vital Signs:  B/P: 125/84,  T: 102.6,  P: 122,  R: 17    General:  patient lying in bed without any acute distress    HEENT:  normocephalic/atraumatic  Cardio:  irregularly irregular  Pulmonary:  no respiratory distress  Abdomen:  soft  Extremities:  peripheral pulses palpable  Skin:  intact     Neurologic  Mental Status:  fully alert, attentive and oriented, follows commands, speech clear and fluent  Cranial Nerves:  visual fields intact, PERRL, EOMI with normal smooth pursuit, facial sensation intact and symmetric, facial movements symmetric, hearing not formally tested but intact to conversation,  palate elevation symmetric and uvula midline, no dysarthria, shoulder shrug strong bilaterally, tongue protrusion midline  Motor:  no abnormal movements, normal tone throughout, normal muscle bulk, no pronator drift, normal and symmetric rapid finger tapping, able to move all limbs spontaneously, strength 5/5 throughout upper and lower extremities  Sensory:  intact/symmetric to light touch and pin prick throughout upper and lower extremities  Coordination:  FNF and HS intact without dysmetria    Labs  Labs and Imaging reviewed and used in developing the plan; pertinent results included.     Lab Results   Component Value Date     (H) 04/02/2017       The patient was discussed with the Fellow, Dr. Reagan.  The staff is Dr. Gonzalez.    Ander Bain  Pager: [phone]

## 2017-04-03 NOTE — ED PROVIDER NOTES
History     Chief Complaint   Patient presents with     Dizziness     HPI  Betty Tee is a 76 year old female with a history of DM type II, HFpEF, alcohol abuse (in remission) DVT on Coumadin, left ventricular hypertrophy, afib on Metoprolol interstitial lung disease and HTN who presents to the Emergency Department via EMS for evaluation of dizziness. Per EMS, the patient's niece spoke to the patient this evening and noticing she was not speaking normal. The patient s neighbor noticed the patient did not get out of bed all day. When she went over to check on the patient around 8:15 PM (~1.5 hours ago), she was acting abnormally prompting her to call EMS. Their 12 lead EKG showed AVR with reciprocal changes in lead 2, AVF, V2, V3, V4, V5 and V6. Per [facility] protocol this is STEMI alert. She was given 325 mg Aspirin but no NTG due to no active chest pain. Blood pressure was 154/90 and sating 96% on 4 liters of O2 via NC. Per the patient, she was supposed to go to [place], [place] today for a celebration but was too tired to attend and went back to bed. Currently, the patient reports dizziness. She denies shortness of breath, confused or any other concerns or complaints at this time.  There is some history that the niece had called the patient and she was not speaking normally.  A prehospital STEMI was called.  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 ER- neg CT scan for PE, neg stress echo in 8/06     Chest pain, unspecified      Disorder of bone and cartilage, unspecified     osteopenia (had  been on prempro), improved on 6/06 dexa, stable dexa 11/10     Diverticulosis of colon (without mention of hemorrhage)     last episode yrs ago     Essential hypertension, benign      Gastro-oesophageal reflux disease      Insomnia, unspecified     weaned off clonazepam     Irregular heart beat      Lumbago 7/09    MRI with DJD, now seeing Dr. Cain for sciatic sx's     Major depressive disorder, recurrent episode, moderate (H)      Obstructive sleep apnea      Osteoarthrosis, unspecified whether generalized or localized, unspecified site      Sjogren's syndrome (H)      Sleep apnea      Tobacco use disorder     chantix in 9/07, started again in 6/08, working       Past Surgical History:   Procedure Laterality Date     BACK SURGERY  1962     BIOPSY BREAST  9/27/02    Biopsy Left Breast     C APPENDECTOMY  1970's?     C NONSPECIFIC PROCEDURE  11/05    exploratory abd lap, adhesions, resolved RLQ pain, diverticulitis episodes     CARDIAC SURGERY       CHOLECYSTECTOMY  1990's?     COLECTOMY LEFT  11/7/2011    Procedure:COLECTOMY LEFT; Laparoscopic mobilization of splenic flexture, sigmoid colectomy, coloprotoscopy, loop illeostomy; Surgeon:CK CASTANEDA; Location:[facility] OR     HYSTERECTOMY TOTAL ABDOMINAL, BILATERAL SALPINGO-OOPHORECTOMY, COMBINED  11/7/2011    Procedure:COMBINED HYSTERECTOMY TOTAL ABDOMINAL, BILATERAL SALPINGO-OOPHORECTOMY; total abdominal hysterectomy, bilateral salpingo-oophorectomy; Surgeon:ALETA MANUEL; Location:[facility] OR     INSERT STENT URETER  11/7/2011    Procedure:INSERT STENT URETER; Placement of Bilateral Ureteral Stents ; Surgeon:PRANEETH BRYANT; Location:[facility] OR     SIGMOIDOSCOPY FLEXIBLE  11/3/2011    Procedure:SIGMOIDOSCOPY FLEXIBLE; Flexible Sigmoidoscopy; Surgeon:CK CASTANEDA; Location:[facility] OR     TAKEDOWN ILEOSTOMY  2/1/2012    Procedure:TAKEDOWN ILEOSTOMY; Takedown Loop Ileostomy ; Surgeon:CK CASTANEDA; Location:[facility] OR       Family History   Problem Relation Age of Onset      C.A.D. Mother 63     MI- first at age 63     C.A.D. Sister 52     Minor MI- age 50's     HEART DISEASE Mother      HEART DISEASE Sister      Hypertension Mother      Hypertension Sister      Hypertension Sister      Hypertension Brother      CEREBROVASCULAR DISEASE Mother      Cancer - colorectal Sister 48     Late 40's early 50's     Prostate Cancer Brother 74     Dx'd age 74     Alcohol/Drug Father      Alzheimer Disease Father      GASTROINTESTINAL DISEASE Sister      Diverticulitis     GASTROINTESTINAL DISEASE Brother      Diverticulitis     Lipids Sister      Lipids Sister      Parkinsonism Brother      DIABETES Sister      HEART DISEASE Sister      CHF     CANCER Sister      lung, smoker     Substance Abuse Sister      Substance Abuse Brother      Dementia Father      Asthma Sister      CANCER Sister      Substance Abuse Sister      Substance Abuse Brother      Asthma Sister      Hypertension Father      Breast Cancer Daughter      Prostate Cancer Brother      Hyperlipidemia Mother      Hyperlipidemia Father      Hyperlipidemia Brother        Social History   Substance Use Topics     Smoking status: Former Smoker     Packs/day: 0.50     Years: 10.00     Types: Cigarettes     Quit date: 8/1/2011     Smokeless tobacco: Never Used      Comment: 1/2 ppd     Alcohol use No      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       Current Facility-Administered Medications   Medication     0.9% sodium chloride BOLUS     cefTRIAXone (ROCEPHIN) 2 g vial to attach to  ml bag for ADULTS or NS 50 ml bag for PEDS     Current Outpatient Prescriptions   Medication     metFORMIN (GLUCOPHAGE-XR) 500 MG 24 hr tablet     order for DME     pantoprazole (PROTONIX) 20 MG EC tablet     PARoxetine (PAXIL) 10 MG tablet     traZODone (DESYREL) 50 MG tablet     ketoconazole (NIZORAL) 2 % cream     predniSONE (DELTASONE) 10 MG tablet     HYDROcodone-acetaminophen (NORCO) 5-325 MG per tablet     furosemide (LASIX) 40 MG tablet      metoprolol (TOPROL-XL) 100 MG 24 hr tablet     spironolactone (ALDACTONE) 25 MG tablet     montelukast (SINGULAIR) 10 MG tablet     albuterol (PROAIR HFA, PROVENTIL HFA, VENTOLIN HFA) 108 (90 BASE) MCG/ACT inhaler     calcium-vitamin D (CALTRATE) 600-400 MG-UNIT per tablet     order for DME     azithromycin (ZITHROMAX) 250 MG tablet     diclofenac (VOLTAREN) 1 % GEL     polyethylene glycol (MIRALAX/GLYCOLAX) packet     warfarin (COUMADIN) 7.5 MG tablet     acyclovir (ZOVIRAX) 5 % ointment     fluticasone (FLOVENT HFA) 220 MCG/ACT inhaler     acyclovir (ZOVIRAX) 400 MG tablet     fluticasone (FLONASE) 50 MCG/ACT nasal spray     COMPRESSION STOCKINGS     acetaminophen (TYLENOL) 500 MG tablet        Allergies   Allergen Reactions     Augmentin Nausea and Vomiting     Codeine Nausea and Vomiting     Phenobarbital Itching     I have reviewed the Medications, Allergies, Past Medical and Surgical History, and Social History in the Epic system.    Review of Systems   Constitutional: Positive for fatigue. Negative for fever.   Respiratory: Negative for shortness of breath.    Cardiovascular: Negative for chest pain.   Gastrointestinal: Negative for abdominal pain.   Neurological: Positive for dizziness. Negative for weakness.   Psychiatric/Behavioral: Negative for confusion.   All other systems reviewed and are negative.      Physical Exam   BP: 125/84  Pulse: 122  Resp: 22  SpO2: 95 %  Physical Exam  Physical Exam   Constitutional:   well nourished, well developed, resting comfortably  lying flat with no respiratory difficulty.  HENT:   Head: Normocephalic and atraumatic.   Eyes: Conjunctivae are normal. Pupils are equal, round, and reactive to light.   pharynx has no erythema or exudate, mucous membranes are moist  Neck:   nonmobile Mass beneath right mandible, no bony tenderness  Cardiovascular: irregular rate and rhythm, rapid without murmurs or gallops  Pulmonary/Chest: Clear to auscultation bilaterally, with no wheezes  or retractions. No respiratory distress.  GI: Soft with good bowel sounds.  Non-tender, non-distended, with no guarding, no rebound, no peritoneal signs.   Back:  No bony or CVA tenderness   Musculoskeletal:  no edema or clubbing   Skin: Skin is warm and dry. No rash noted.   Neurological: alert and oriented to person, place, and time. Nonfocal exam, GCS is 15, cranial nerves II through XII are grossly intact, patient is able to repeat phrases without difficulty, her speech is slow and deliberate, full upper and lower extremity strength, able to hold her legs up against gravity.  Psychiatric:  normal mood and affect.   ED Course     9:41 PM  The patient was seen and examined by Dr. Ibarra in Room 1.     ED Course     Procedures             EKG Interpretation:      Interpreted by Sole Ibarra  Time reviewed: 2145 pm  Symptoms at time of EKG: see hpi   Rhythm: atrial fibrillation - rapid  Rate: 120-130  Axis: Normal  Ectopy: premature ventricular contractions (unifocal)  Conduction: normal  ST Segments/ T Waves: ST Segment Depression V4, V5 and V6 and elevation in aVR.  Q Waves: none  Comparison to prior: Unchanged from 10/26/2106:  Atrial fibrillation, rate of 72 bpm with low voltage QRS.  Nonspecific ST-T wave changes    Clinical Impression: Atrial fibrillation, with RVR, rate of 126/m with ST elevation in aVR and ST segment depressions as noted above.                Critical Care time:  none             /  Results for orders placed or performed during the hospital encounter of 04/02/17 (from the past 24 hour(s))   CBC with platelets differential   Result Value Ref Range    WBC 6.4 4.0 - 11.0 10e9/L    RBC Count 4.66 3.8 - 5.2 10e12/L    Hemoglobin 9.9 (L) 11.7 - 15.7 g/dL    Hematocrit 34.6 (L) 35.0 - 47.0 %    MCV 74 (L) 78 - 100 fl    MCH 21.2 (L) 26.5 - 33.0 pg    MCHC 28.6 (L) 31.5 - 36.5 g/dL    RDW 18.6 (H) 10.0 - 15.0 %    Platelet Count 218 150 - 450 10e9/L    Diff Method Automated Method     %  Neutrophils 71.3 %    % Lymphocytes 14.4 %    % Monocytes 11.9 %    % Eosinophils 1.4 %    % Basophils 0.5 %    % Immature Granulocytes 0.5 %    Nucleated RBCs 0 0 /100    Absolute Neutrophil 4.5 1.6 - 8.3 10e9/L    Absolute Lymphocytes 0.9 0.8 - 5.3 10e9/L    Absolute Monocytes 0.8 0.0 - 1.3 10e9/L    Absolute Eosinophils 0.1 0.0 - 0.7 10e9/L    Absolute Basophils 0.0 0.0 - 0.2 10e9/L    Abs Immature Granulocytes 0.0 0 - 0.4 10e9/L    Absolute Nucleated RBC 0.0    INR   Result Value Ref Range    INR 1.79 (H) 0.86 - 1.14   Comprehensive metabolic panel   Result Value Ref Range    Sodium 143 133 - 144 mmol/L    Potassium 3.3 (L) 3.4 - 5.3 mmol/L    Chloride 108 94 - 109 mmol/L    Carbon Dioxide 23 20 - 32 mmol/L    Anion Gap 12 3 - 14 mmol/L    Glucose 213 (H) 70 - 99 mg/dL    Urea Nitrogen 7 7 - 30 mg/dL    Creatinine 0.58 0.52 - 1.04 mg/dL    GFR Estimate >90  Non  GFR Calc   >60 mL/min/1.7m2    GFR Estimate If Black >90   GFR Calc   >60 mL/min/1.7m2    Calcium 8.2 (L) 8.5 - 10.1 mg/dL    Bilirubin Total 0.6 0.2 - 1.3 mg/dL    Albumin 3.0 (L) 3.4 - 5.0 g/dL    Protein Total 7.0 6.8 - 8.8 g/dL    Alkaline Phosphatase 72 40 - 150 U/L    ALT 19 0 - 50 U/L    AST 29 0 - 45 U/L   Lipase   Result Value Ref Range    Lipase 136 73 - 393 U/L   Lactic acid whole blood   Result Value Ref Range    Lactic Acid 1.6 0.7 - 2.1 mmol/L   Troponin I   Result Value Ref Range    Troponin I ES 0.020 0.000 - 0.045 ug/L   Blood culture   Result Value Ref Range    Specimen Description Blood Left Hand     Culture Micro Pending     Micro Report Status Pending    Troponin POCT   Result Value Ref Range    Troponin I 0.00 0.00 - 0.10 ug/L   Glucose by meter   Result Value Ref Range    Glucose 228 (H) 70 - 99 mg/dL   ISTAT gases elec ica gluc thad POCT   Result Value Ref Range    Ph Venous 7.46 (H) 7.32 - 7.43 pH    PCO2 Venous 35 (L) 40 - 50 mm Hg    PO2 Venous 33 25 - 47 mm Hg    Bicarbonate Venous 24 21 - 28  mmol/L    O2 Sat Venous 68 %    Sodium 141 133 - 144 mmol/L    Potassium 3.4 3.4 - 5.3 mmol/L    Glucose 234 (H) 70 - 99 mg/dL    Calcium Ionized 4.6 4.4 - 5.2 mg/dL    Hemoglobin 11.6 (L) 11.7 - 15.7 g/dL    Hematocrit - POCT 34 (L) 35.0 - 47.0 %PCV   Chest  XR, 1 view portable    Narrative    EXAM: XR CHEST PORT 1 VW  4/2/2017 9:59 PM      HISTORY: stemi    COMPARISON: CT 3/22/2017, chest x-ray 8/1/2016    FINDINGS: Cardiac silhouette is enlarged, stable. Mild streaky medial  right basilar opacities. No pleural effusion or pneumothorax.       Impression    IMPRESSION: Mild streaky opacities in the right lung base of infection  or atelectasis.    I have personally reviewed the examination and initial interpretation  and I agree with the findings.    HOMAR SHIELDS MD   ISTAT gases lactate thad POCT   Result Value Ref Range    Ph Venous 7.46 (H) 7.32 - 7.43 pH    PCO2 Venous 36 (L) 40 - 50 mm Hg    PO2 Venous 35 25 - 47 mm Hg    Bicarbonate Venous 26 21 - 28 mmol/L    O2 Sat Venous 70 %    Lactic Acid 1.5 0.7 - 2.1 mmol/L   INR point of care   Result Value Ref Range    INR Point of Care 1.3 (H) 0.86 - 1.14   Creatinine POCT   Result Value Ref Range    Creatinine 0.6 0.52 - 1.04 mg/dL    GFR Estimate >90 >60 mL/min/1.7m2    GFR Estimate If Black >90 >60 mL/min/1.7m2     *Note: Due to a large number of results and/or encounters for the requested time period, some results have not been displayed. A complete set of results can be found in Results Review.     Labs Ordered and Resulted from Time of ED Arrival Up to the Time of Departure from the ED   CBC WITH PLATELETS DIFFERENTIAL - Abnormal; Notable for the following:        Result Value    Hemoglobin 9.9 (*)     Hematocrit 34.6 (*)     MCV 74 (*)     MCH 21.2 (*)     MCHC 28.6 (*)     RDW 18.6 (*)     All other components within normal limits   INR - Abnormal; Notable for the following:     INR 1.79 (*)     All other components within normal limits   COMPREHENSIVE  METABOLIC PANEL - Abnormal; Notable for the following:     Potassium 3.3 (*)     Glucose 213 (*)     Calcium 8.2 (*)     Albumin 3.0 (*)     All other components within normal limits   GLUCOSE BY METER - Abnormal; Notable for the following:     Glucose 228 (*)     All other components within normal limits   INR POINT OF CARE - Abnormal; Notable for the following:     INR Point of Care 1.3 (*)     All other components within normal limits   ISTAT GASES ELEC ICA GLUC MARYCHUY POCT - Abnormal; Notable for the following:     Ph Venous 7.46 (*)     PCO2 Venous 35 (*)     Glucose 234 (*)     Hemoglobin 11.6 (*)     Hematocrit - POCT 34 (*)     All other components within normal limits   ISTAT  GASES LACTATE MARYCHUY POCT - Abnormal; Notable for the following:     Ph Venous 7.46 (*)     PCO2 Venous 36 (*)     All other components within normal limits   LIPASE   LACTIC ACID WHOLE BLOOD   TROPONIN I   CREATININE POCT   UA MACROSCOPIC WITH REFLEX TO MICRO AND CULTURE   ISTAT INR NURSING POCT   ISTAT CREATININE NURSING POCT   TROPONIN POCT   BLOOD CULTURE   BLOOD CULTURE   URINE CULTURE AEROBIC BACTERIAL   INFLUENZA A/B ANTIGEN     Medications   azithromycin (ZITHROMAX) 500 mg in NaCl 0.9 % 250 mL intermittent infusion (not administered)   metoprolol (LOPRESSOR) injection 5 mg (5 mg Intravenous Given 4/2/17 2154)   0.9% sodium chloride BOLUS (0 mLs Intravenous Stopped 4/2/17 2328)   acetaminophen (TYLENOL) tablet 1,000 mg (1,000 mg Oral Given 4/2/17 2251)   iopamidol (ISOVUE-370) solution 75 mL (75 mLs Intravenous Given 4/2/17 2238)   sodium chloride 0.9 % for CT scan flush dose 90 mL (90 mLs Intravenous Given 4/2/17 2239)   cefTRIAXone (ROCEPHIN) 2 g vial to attach to  ml bag for ADULTS or NS 50 ml bag for PEDS (2 g Intravenous New Bag 4/2/17 2329)      Assessments & Plan (with Medical Decision Making)       I have reviewed the nursing notes.  Emergency Department course:  The patient was seen and examined at 2141 pm, on arrival.   A prehospital STEMI had been called prior to her arrival.  She was given 324 mg of aspirin by medics prior to arrival.  EKG shows atrial fibrillation with RVR, rate of 126 bpm with ST elevation in aVR and ST depression as noted above.  I did not feel this represents an acute myocardial infarction.   I spoke with [facility] cardiology Dr. Mcmullen to cancel the STEMI.  He recommended obtaining an echocardiogram.  I was more concerned about the patient's history of speech difficulty and called a stroke code on the patient.  Shortly thereafter, I was told that her temperature is 102.6.  I ordered metoprolol 5 mg IV for atrial fibrillation with RVR as well as Tylenol 1 g by mouth.  Chest x-ray shows mild streaky opacities in the right lung base suggesting infection versus atelectasis.  Laboratory studies show anemia, with a hemoglobin of 9.9.  WBC is 6.4.  Lactate is within normal limits at 1.6.  The patient is hypokalemic, potassium of 3.3.  Glucose is elevated 213.  Lipase is within normal limits at 136.  Troponin I is 0.020.  INR is 1.79.   The patient underwent stat head and neck CT angiogram without with and without contrast.  Results showed no acute infarct.  An incidental finding of  right sided lymphadenopathy involving the right submandibular region adjacent to the right submandibular gland. Etiology includes lymphoma or metastatic disease or reactive lymphadenopathy. However, there are no other signs of inflammation. Ultrasound-guided biopsy may be necessary.  I discussed this finding with the patient.  She states that this lump has been biopsied in the past.  She states she was diagnosed with Sjogren's syndrome after the biopsy.  The patient is an elderly female who is febrile and appears to have a developing pneumonia.  She had a brief episode of reported speech difficulty which has since resolved.  I treated the patient with Rocephin IV and Zithromax IV for presumed community-acquired pneumonia..  Blood  cultures are pending. UA is pending at the time of this dictation.   She will be admitted to the medicine service for IV antibiotics and further evaluation.  I spoke to Dr. Busby regarding admission.  I have reviewed the findings, diagnosis, plan and need for follow up with the patient.    New Prescriptions    No medications on file       Final diagnoses:   Pneumonia due to infectious organism, unspecified laterality, unspecified part of lung   Atrial fibrillation with rapid ventricular response (H)   Fever, unspecified     I, Kami Granado, am serving as a trained medical scribe to document services personally performed by Sole Ibarra MD, based on the provider's statements to me.      I, Sole Ibarra MD, was physically present and have reviewed and verified the accuracy of this note documented by Kami Granado.   This note was created in part by the use of Dragon voice recognition dictation system. Inadvertent grammatical errors and typographical errors may still exist.  Sole Ibarra MD    4/2/2017   [facility], [place], EMERGENCY DEPARTMENT     Sole Ibarra MD  04/03/17 0007

## 2017-04-03 NOTE — PLAN OF CARE
Stroke Code   Gave report to Betty Gallagher RN on return from CT at 2225. Neuro status intact.  Neuro Doctor check Patient's ability to swallow and successfully drank a whole cup of water.

## 2017-04-03 NOTE — PLAN OF CARE
Problem: Goal Outcome Summary  Goal: Goal Outcome Summary  Outcome: Improving  /64  Pulse 97  Temp 98.7  F (37.1  C) (Oral)  Resp 18  SpO2 97%     2 L NC OVSS, Tele A-fib in the 80's. A&Ox4. C/O chest pain d/t coughing denies need for medication. Wheezing in both lungs, neb given. R and L piv SL'd. Admission done. Regular diet. Insulin ordered in. SBA. Voiding well. Continue POC.

## 2017-04-03 NOTE — PHARMACY-ANTICOAGULATION SERVICE
Clinical Pharmacy - Warfarin Dosing Consult     Pharmacy has been consulted to manage this patient s warfarin therapy.  Indication: Atrial Fibrillation  Therapy Goal: INR 2-3  OP Anticoag Clinic:  clinic  Warfarin Prior to Admission: Yes  Warfarin PTA Regimen: 7.5mg/day  Significant drug interactions: azithromycin, paroxetine, enoxaparin  Recent documented change in oral intake/nutrition: Unknown  Dose Comments: patient is unsure if she received a dose on 4/2    INR   Date Value Ref Range Status   04/03/2017 1.72 (H) 0.86 - 1.14 Final     INR Point of Care   Date Value Ref Range Status   04/02/2017 1.3 (H) 0.86 - 1.14 Final       Recommend warfarin 7.5 mg today.  Pharmacy will monitor Betty Tee daily and order warfarin doses to achieve specified goal.      Please contact pharmacy as soon as possible if the warfarin needs to be held for a procedure or if the warfarin goals change.    Lanre Tobin, pharmD

## 2017-04-03 NOTE — PHARMACY-ADMISSION MEDICATION HISTORY
Admission medication history interview status for the 4/2/2017 admission is complete. See Epic admission navigator for allergy information, pharmacy, prior to admission medications and immunization status.     Medication history interview sources:  Patient     Changes made to PTA medication list (reason)  Added: none    Deleted: none    Changed:   1. Paroxetine 10 mg by mouth once daily ==> Paroxetine 5 mg by mouth at bedtime (Per PCP, Ilene Tristan. Noted on 03/08 Office Visit Note)    Additional medication history information (including reliability of information, actions taken by pharmacist):  Patient is a reliable medication historian, familiar with all of his medications, and directions.     1. Patient is on Warfarin for Atrial Fibrillation with a goal INR 2-3. Her warfarin is monitored by  Outpatient clinic by her PCP on a monthly basis. Last INR check was on 3/28 with an INR of 2.3 and recommendation to continue current regimen of Warfarin 7.5 mg by mouth once daily.   2. Patient received the Influenza vaccine this flu season per [facility].      Prior to Admission medications    Medication Sig Last Dose Taking? Auth Provider   metFORMIN (GLUCOPHAGE-XR) 500 MG 24 hr tablet Take 2 tablets (1,000 mg) by mouth daily (with dinner) 4/2/2017 at Unknown time Yes Ilene Tristan MD   PARoxetine (PAXIL) 10 MG tablet TAKE 1 TABLET (10mg) BY MOUTH AT BEDTIME  Patient taking differently: Take 5 mg by mouth every morning TAKE 1 TABLET (10mg) BY MOUTH AT BEDTIME 4/2/2017 at Unknown time Yes Ilene Tristan MD   traZODone (DESYREL) 50 MG tablet TAKE 1/2-1 TABLET BY MOUTH NIGHTLY AS NEEDED, CAN TITRATE DOWN TO 1/2TAB OR UP TO 2TABS AS NEEDED Past Week at Unknown time Yes Ilene Tristan MD   ketoconazole (NIZORAL) 2 % cream Apply topically 2 times daily Past Month at Unknown time Yes Ilene Tristan MD   predniSONE (DELTASONE) 10 MG tablet Take 1 tablet (10 mg) by mouth daily 4/2/2017 at  Unknown time Yes Meño Walsh MD   furosemide (LASIX) 40 MG tablet Take 1 tablet (40 mg) by mouth 2 times daily Past Week at Unknown time Yes Radha Rosa MD   metoprolol (TOPROL-XL) 100 MG 24 hr tablet Take 1 tablet (100 mg) by mouth daily 4/2/2017 at Unknown time Yes Radha Rosa MD   spironolactone (ALDACTONE) 25 MG tablet Take 1 tablet (25 mg) by mouth daily 4/2/2017 at Unknown time Yes Radha Rosa MD   montelukast (SINGULAIR) 10 MG tablet Take 1 tablet (10 mg) by mouth At Bedtime 4/2/2017 at Unknown time Yes Kevin Florez MD   albuterol (PROAIR HFA, PROVENTIL HFA, VENTOLIN HFA) 108 (90 BASE) MCG/ACT inhaler Inhale 2 puffs into the lungs every 6 hours 4/2/2017 at Unknown time Yes Meño Walsh MD   azithromycin (ZITHROMAX) 250 MG tablet Take 1 tablet (250 mg) by mouth daily Past Week at Unknown time Yes Meño Walsh MD   warfarin (COUMADIN) 7.5 MG tablet Current dose is 7.5 mg daily.  Dose adjusted per INR result. 4/1/2017 at Unknown time Yes Ilene Tristan MD   acyclovir (ZOVIRAX) 5 % ointment Apply topically 6 times daily  Patient taking differently: Apply topically 6 times daily As needed for outbreaks Past Week at Unknown time Yes Ilene Tristan MD   fluticasone (FLOVENT HFA) 220 MCG/ACT inhaler Inhale 2 puffs into the lungs 2 times daily Past Week at Unknown time Yes Kevin Florez MD   fluticasone (FLONASE) 50 MCG/ACT nasal spray Spray 1-2 sprays into both nostrils daily  Patient taking differently: Spray 1-2 sprays into both nostrils daily as needed for allergies  Past Week at Unknown time Yes Ilene Tristan MD   acetaminophen (TYLENOL) 500 MG tablet Take 500 mg by mouth nightly as needed  4/2/2017 at Unknown time Yes Reported, Patient   order for DME Oxygen: Patient requires supplemental Oxygen 4 LPM via nasal canula with activity. Please provide patient with POC to improve mobility, okay to titrate for conserving to keep stats above  90%. Please fax results to [phone].  Oxygen will be for a lifetime.   Meño Walsh MD   pantoprazole (PROTONIX) 20 MG EC tablet Take 1-2 tablets (20-40 mg) by mouth daily   Ilene Tristan MD   order for DME Oxygen: Patient requires supplemental Oxygen 4 LPM via nasal canula with activity. Please provide patient with a home unit and with portability capability. Oxygen will be for a lifetime.   Tonia Graham MD   polyethylene glycol (MIRALAX/GLYCOLAX) packet Take 17 g by mouth daily as needed for constipation More than a month at Unknown time  Jp Hauser MD   acyclovir (ZOVIRAX) 400 MG tablet Take 400 mg by mouth See Admin Instructions 5 times daily as needed for outbreaks   Reported, Patient   COMPRESSION STOCKINGS Wear compression stockings in affected leg (right leg) or both legs most of the time during the day and take them off at night.   Juan M Feng, PAEnriqueC         Medication history completed by: Gregg Novak, 4th Year PharmD Student     Simba Rodríguez PharmD, PGY2 Infectious Disease Pharmacy Resident  Please reach out if questions come up.  Pager: [phone]

## 2017-04-03 NOTE — ED NOTES
Bed: ED01  Expected date: 4/2/17  Expected time: 9:42 AM  Means of arrival: Ambulance  Comments:  [address]  [place]   Red  76/F

## 2017-04-04 ENCOUNTER — TELEPHONE (OUTPATIENT)
Dept: FAMILY MEDICINE | Facility: CLINIC | Age: 77
End: 2017-04-04

## 2017-04-04 LAB
ANION GAP SERPL CALCULATED.3IONS-SCNC: 8 MMOL/L (ref 3–14)
BACTERIA SPEC CULT: NORMAL
BASOPHILS # BLD AUTO: 0 10E9/L (ref 0–0.2)
BASOPHILS NFR BLD AUTO: 0.5 %
BUN SERPL-MCNC: 12 MG/DL (ref 7–30)
CALCIUM SERPL-MCNC: 8.6 MG/DL (ref 8.5–10.1)
CHLORIDE SERPL-SCNC: 106 MMOL/L (ref 94–109)
CO2 SERPL-SCNC: 24 MMOL/L (ref 20–32)
CREAT SERPL-MCNC: 0.62 MG/DL (ref 0.52–1.04)
CRP SERPL-MCNC: 54 MG/L (ref 0–8)
DIFFERENTIAL METHOD BLD: ABNORMAL
EOSINOPHIL # BLD AUTO: 0 10E9/L (ref 0–0.7)
EOSINOPHIL NFR BLD AUTO: 0.7 %
ERYTHROCYTE [DISTWIDTH] IN BLOOD BY AUTOMATED COUNT: 18.8 % (ref 10–15)
FLUAV H1 2009 PAND RNA SPEC QL NAA+PROBE: NEGATIVE
FLUAV H1 RNA SPEC QL NAA+PROBE: NEGATIVE
FLUAV H3 RNA SPEC QL NAA+PROBE: NEGATIVE
FLUAV RNA SPEC QL NAA+PROBE: NEGATIVE
FLUBV RNA SPEC QL NAA+PROBE: ABNORMAL
GFR SERPL CREATININE-BSD FRML MDRD: ABNORMAL ML/MIN/1.7M2
GLUCOSE BLDC GLUCOMTR-MCNC: 216 MG/DL (ref 70–99)
GLUCOSE BLDC GLUCOMTR-MCNC: 258 MG/DL (ref 70–99)
GLUCOSE BLDC GLUCOMTR-MCNC: 343 MG/DL (ref 70–99)
GLUCOSE BLDC GLUCOMTR-MCNC: 343 MG/DL (ref 70–99)
GLUCOSE BLDC GLUCOMTR-MCNC: 418 MG/DL (ref 70–99)
GLUCOSE BLDC GLUCOMTR-MCNC: 457 MG/DL (ref 70–99)
GLUCOSE BLDC GLUCOMTR-MCNC: 523 MG/DL (ref 70–99)
GLUCOSE SERPL-MCNC: 215 MG/DL (ref 70–99)
HADV DNA SPEC QL NAA+PROBE: NEGATIVE
HADV DNA SPEC QL NAA+PROBE: NEGATIVE
HBA1C MFR BLD: 14.3 % (ref 4.3–6)
HCT VFR BLD AUTO: 30.5 % (ref 35–47)
HGB BLD-MCNC: 8.3 G/DL (ref 11.7–15.7)
HMPV RNA SPEC QL NAA+PROBE: NEGATIVE
HPIV1 RNA SPEC QL NAA+PROBE: NEGATIVE
HPIV2 RNA SPEC QL NAA+PROBE: NEGATIVE
HPIV3 RNA SPEC QL NAA+PROBE: NEGATIVE
IMM GRANULOCYTES # BLD: 0 10E9/L (ref 0–0.4)
IMM GRANULOCYTES NFR BLD: 0.2 %
INR PPP: 1.53 (ref 0.86–1.14)
LYMPHOCYTES # BLD AUTO: 1.3 10E9/L (ref 0.8–5.3)
LYMPHOCYTES NFR BLD AUTO: 30.6 %
Lab: NORMAL
MCH RBC QN AUTO: 20.6 PG (ref 26.5–33)
MCHC RBC AUTO-ENTMCNC: 27.2 G/DL (ref 31.5–36.5)
MCV RBC AUTO: 76 FL (ref 78–100)
MICRO REPORT STATUS: NORMAL
MICROBIOLOGIST REVIEW: ABNORMAL
MONOCYTES # BLD AUTO: 0.5 10E9/L (ref 0–1.3)
MONOCYTES NFR BLD AUTO: 12 %
NEUTROPHILS # BLD AUTO: 2.3 10E9/L (ref 1.6–8.3)
NEUTROPHILS NFR BLD AUTO: 56 %
NRBC # BLD AUTO: 0 10*3/UL
NRBC BLD AUTO-RTO: 0 /100
PLATELET # BLD AUTO: 188 10E9/L (ref 150–450)
POTASSIUM SERPL-SCNC: 3.8 MMOL/L (ref 3.4–5.3)
RBC # BLD AUTO: 4.02 10E12/L (ref 3.8–5.2)
RHINOVIRUS RNA SPEC QL NAA+PROBE: NEGATIVE
RSV RNA SPEC QL NAA+PROBE: NEGATIVE
RSV RNA SPEC QL NAA+PROBE: NEGATIVE
SODIUM SERPL-SCNC: 138 MMOL/L (ref 133–144)
SPECIMEN SOURCE: ABNORMAL
SPECIMEN SOURCE: NORMAL
WBC # BLD AUTO: 4.2 10E9/L (ref 4–11)

## 2017-04-04 PROCEDURE — 25000132 ZZH RX MED GY IP 250 OP 250 PS 637: Mod: GY | Performed by: HOSPITALIST

## 2017-04-04 PROCEDURE — 86140 C-REACTIVE PROTEIN: CPT | Performed by: FAMILY MEDICINE

## 2017-04-04 PROCEDURE — 25000128 H RX IP 250 OP 636: Performed by: FAMILY MEDICINE

## 2017-04-04 PROCEDURE — A9270 NON-COVERED ITEM OR SERVICE: HCPCS | Mod: GY | Performed by: FAMILY MEDICINE

## 2017-04-04 PROCEDURE — 25000131 ZZH RX MED GY IP 250 OP 636 PS 637: Mod: GY | Performed by: FAMILY MEDICINE

## 2017-04-04 PROCEDURE — 85610 PROTHROMBIN TIME: CPT | Performed by: STUDENT IN AN ORGANIZED HEALTH CARE EDUCATION/TRAINING PROGRAM

## 2017-04-04 PROCEDURE — 94640 AIRWAY INHALATION TREATMENT: CPT

## 2017-04-04 PROCEDURE — 94640 AIRWAY INHALATION TREATMENT: CPT | Mod: 76

## 2017-04-04 PROCEDURE — 36415 COLL VENOUS BLD VENIPUNCTURE: CPT | Performed by: STUDENT IN AN ORGANIZED HEALTH CARE EDUCATION/TRAINING PROGRAM

## 2017-04-04 PROCEDURE — 40000275 ZZH STATISTIC RCP TIME EA 10 MIN

## 2017-04-04 PROCEDURE — 25000132 ZZH RX MED GY IP 250 OP 250 PS 637: Mod: GY | Performed by: FAMILY MEDICINE

## 2017-04-04 PROCEDURE — 25000128 H RX IP 250 OP 636: Performed by: STUDENT IN AN ORGANIZED HEALTH CARE EDUCATION/TRAINING PROGRAM

## 2017-04-04 PROCEDURE — 25000125 ZZHC RX 250: Performed by: STUDENT IN AN ORGANIZED HEALTH CARE EDUCATION/TRAINING PROGRAM

## 2017-04-04 PROCEDURE — 83036 HEMOGLOBIN GLYCOSYLATED A1C: CPT | Performed by: STUDENT IN AN ORGANIZED HEALTH CARE EDUCATION/TRAINING PROGRAM

## 2017-04-04 PROCEDURE — 86140 C-REACTIVE PROTEIN: CPT | Performed by: HOSPITALIST

## 2017-04-04 PROCEDURE — 85025 COMPLETE CBC W/AUTO DIFF WBC: CPT | Performed by: STUDENT IN AN ORGANIZED HEALTH CARE EDUCATION/TRAINING PROGRAM

## 2017-04-04 PROCEDURE — 36415 COLL VENOUS BLD VENIPUNCTURE: CPT | Performed by: FAMILY MEDICINE

## 2017-04-04 PROCEDURE — 25000132 ZZH RX MED GY IP 250 OP 250 PS 637: Mod: GY | Performed by: STUDENT IN AN ORGANIZED HEALTH CARE EDUCATION/TRAINING PROGRAM

## 2017-04-04 PROCEDURE — 21400006 ZZH R&B CCU INTERMEDIATE UMMC

## 2017-04-04 PROCEDURE — A9270 NON-COVERED ITEM OR SERVICE: HCPCS | Mod: GY | Performed by: HOSPITALIST

## 2017-04-04 PROCEDURE — 00000146 ZZHCL STATISTIC GLUCOSE BY METER IP

## 2017-04-04 PROCEDURE — 80048 BASIC METABOLIC PNL TOTAL CA: CPT | Performed by: FAMILY MEDICINE

## 2017-04-04 PROCEDURE — A9270 NON-COVERED ITEM OR SERVICE: HCPCS | Mod: GY | Performed by: STUDENT IN AN ORGANIZED HEALTH CARE EDUCATION/TRAINING PROGRAM

## 2017-04-04 RX ORDER — LACTOBACILLUS RHAMNOSUS GG 10B CELL
1 CAPSULE ORAL
Status: DISCONTINUED | OUTPATIENT
Start: 2017-04-04 | End: 2017-04-11 | Stop reason: HOSPADM

## 2017-04-04 RX ORDER — ACETAMINOPHEN 650 MG/1
650 SUPPOSITORY RECTAL EVERY 8 HOURS
Status: DISCONTINUED | OUTPATIENT
Start: 2017-04-04 | End: 2017-04-11 | Stop reason: HOSPADM

## 2017-04-04 RX ORDER — METFORMIN HCL 500 MG
1000 TABLET, EXTENDED RELEASE 24 HR ORAL
Status: DISCONTINUED | OUTPATIENT
Start: 2017-04-04 | End: 2017-04-06

## 2017-04-04 RX ORDER — WARFARIN SODIUM 7.5 MG/1
7.5 TABLET ORAL
Status: COMPLETED | OUTPATIENT
Start: 2017-04-04 | End: 2017-04-04

## 2017-04-04 RX ORDER — ACETAMINOPHEN 500 MG
1000 TABLET ORAL EVERY 8 HOURS
Status: DISCONTINUED | OUTPATIENT
Start: 2017-04-04 | End: 2017-04-11 | Stop reason: HOSPADM

## 2017-04-04 RX ORDER — L. ACIDOPHILUS/PECTIN, CITRUS 25MM-100MG
1 TABLET ORAL
Status: DISCONTINUED | OUTPATIENT
Start: 2017-04-04 | End: 2017-04-04

## 2017-04-04 RX ADMIN — Medication 1 CAPSULE: at 16:42

## 2017-04-04 RX ADMIN — CEFTRIAXONE 2 G: 2 INJECTION, POWDER, FOR SOLUTION INTRAMUSCULAR; INTRAVENOUS at 23:49

## 2017-04-04 RX ADMIN — PREDNISONE 30 MG: 20 TABLET ORAL at 08:16

## 2017-04-04 RX ADMIN — WARFARIN SODIUM 7.5 MG: 7.5 TABLET ORAL at 17:26

## 2017-04-04 RX ADMIN — METFORMIN HYDROCHLORIDE 1000 MG: 500 TABLET, EXTENDED RELEASE ORAL at 16:42

## 2017-04-04 RX ADMIN — GUAIFENESIN AND DEXTROMETHORPHAN 10 ML: 100; 10 SYRUP ORAL at 10:15

## 2017-04-04 RX ADMIN — GUAIFENESIN AND DEXTROMETHORPHAN 10 ML: 100; 10 SYRUP ORAL at 16:02

## 2017-04-04 RX ADMIN — IPRATROPIUM BROMIDE AND ALBUTEROL SULFATE 3 ML: .5; 3 SOLUTION RESPIRATORY (INHALATION) at 08:19

## 2017-04-04 RX ADMIN — IPRATROPIUM BROMIDE AND ALBUTEROL SULFATE 3 ML: .5; 3 SOLUTION RESPIRATORY (INHALATION) at 15:38

## 2017-04-04 RX ADMIN — INSULIN GLARGINE 10 UNITS: 100 INJECTION, SOLUTION SUBCUTANEOUS at 08:12

## 2017-04-04 RX ADMIN — PAROXETINE HYDROCHLORIDE 10 MG: 10 TABLET, FILM COATED ORAL at 21:00

## 2017-04-04 RX ADMIN — ENOXAPARIN SODIUM 40 MG: 40 INJECTION SUBCUTANEOUS at 08:11

## 2017-04-04 RX ADMIN — PANTOPRAZOLE SODIUM 20 MG: 20 TABLET, DELAYED RELEASE ORAL at 08:15

## 2017-04-04 RX ADMIN — Medication 1 CAPSULE: at 14:23

## 2017-04-04 RX ADMIN — IPRATROPIUM BROMIDE AND ALBUTEROL SULFATE 3 ML: .5; 3 SOLUTION RESPIRATORY (INHALATION) at 20:04

## 2017-04-04 RX ADMIN — MONTELUKAST SODIUM 10 MG: 10 TABLET, FILM COATED ORAL at 21:00

## 2017-04-04 RX ADMIN — ACETAMINOPHEN 1000 MG: 325 TABLET, FILM COATED ORAL at 16:41

## 2017-04-04 RX ADMIN — KETOCONAZOLE: 20 CREAM TOPICAL at 08:14

## 2017-04-04 RX ADMIN — FLUTICASONE FUROATE 1 PUFF: 200 POWDER RESPIRATORY (INHALATION) at 08:13

## 2017-04-04 RX ADMIN — INSULIN GLARGINE 4 UNITS: 100 INJECTION, SOLUTION SUBCUTANEOUS at 10:36

## 2017-04-04 RX ADMIN — IPRATROPIUM BROMIDE AND ALBUTEROL SULFATE 3 ML: .5; 3 SOLUTION RESPIRATORY (INHALATION) at 11:58

## 2017-04-04 RX ADMIN — AZITHROMYCIN 250 MG: 250 TABLET, FILM COATED ORAL at 01:43

## 2017-04-04 RX ADMIN — METOPROLOL SUCCINATE 100 MG: 100 TABLET, FILM COATED, EXTENDED RELEASE ORAL at 08:15

## 2017-04-04 RX ADMIN — GUAIFENESIN AND DEXTROMETHORPHAN 10 ML: 100; 10 SYRUP ORAL at 23:49

## 2017-04-04 ASSESSMENT — ENCOUNTER SYMPTOMS
HEADACHES: 0
NERVOUS/ANXIOUS: 0
CONFUSION: 0
CONSTIPATION: 0
VOMITING: 0
SHORTNESS OF BREATH: 1
CHILLS: 0
FEVER: 1
DYSURIA: 0
NAUSEA: 0
COUGH: 1
DIARRHEA: 0
ABDOMINAL PAIN: 1
DIZZINESS: 0

## 2017-04-04 NOTE — PLAN OF CARE
Problem: Goal Outcome Summary  Goal: Goal Outcome Summary  Outcome: No Change     VSS, monitor shows afib with rates 70-90s. On 2-3L via NC. Mild fever at beginning of shift resolved with prn tylenol. Pt endorses abdominal pain when coughing, prn tylenol given with relief. Blood sugars high overnight, covered per orders, see provider notification. IV abx as ordered. Up independently to bathroom with adequate UO. Continue to monitor and notify [facility] team with pertinent changes.

## 2017-04-04 NOTE — PROVIDER NOTIFICATION
Pt's blood sugar was 406 at 2200. Provider notified. [facility] mcconnell switched patient to medium insulin resistance dosing.     [employer] notified again for  at 0200, one time order of 5 units given. Will recheck BG at 0400 per request of carlynver.

## 2017-04-04 NOTE — PLAN OF CARE
Problem: Goal Outcome Summary  Goal: Goal Outcome Summary  D: + for Influenza B. Droplet precautions. pt with ILD here due to PNA.   I: Sliding scale Novolog and scheduled Lantus for BG, patient had a visitor and ate her lunch without calling for BG check so no insulin given. Robitussin for cough.   A: VSS, afib 80's. Up with SBA, voiding in toilet, good urine output. Very pleasant and cooperative.  P: Continue with current POC. Diabetes Edu needed.

## 2017-04-04 NOTE — TELEPHONE ENCOUNTER
Reason for Call:  Other FYI    Detailed comments: pt wants Dr Bran to know that she was admitted to the  on the ICU unit  And she has Pneu and her Diabetes is out of control.    Phone Number Patient can be reached at: Home number on file [phone] (home)    Best Time: antyime    Can we leave a detailed message on this number? YES    Call taken on 4/4/2017 at 1:19 PM by Kalli Hayes

## 2017-04-04 NOTE — PROGRESS NOTES
[facility]    Date of admission: 4/2/2017  Date of admission: 4/2/2017  Date of service: 4/4/2017.           Assessment and Plan:   Assessment:   Betty Tee is a 76 year old who presents for evaluation of persistent cough, dizziness, and concerns for AMS now resolved; and found to have a possible resp infection on CXR.  Patient Active Problem List   Diagnosis     Temporomandibular joint disorder     Diverticulitis of colon     Disorder of bone and cartilage     Osteoarthritis     Alcohol abuse, in remission     Restless legs syndrome (RLS)     Major depressive disorder, recurrent episode, moderate     Essential hypertension with goal blood pressure less than 140/90     CARDIOVASCULAR SCREENING; LDL GOAL LESS THAN 130     Sciatica     Advanced directives, counseling/discussion     Colouterine fistula     Atrial fibrillation with controlled ventricular response (H)     Left ventricular hypertrophy     Health Care Home     Insomnia     Joint pains     Allergic reaction caused by a drug - likely plaquenil     Breast fibroadenoma     DVT (deep venous thrombosis) (H)     Fatty liver disease, nonalcoholic     Rib pain     Neck mass     Gastroesophageal reflux disease without esophagitis     Enlarged lymph node     Sjogren's syndrome (H)     ANGELICA (obstructive sleep apnea)     ILD (interstitial lung disease) (H)     Lower GI bleed     Acute and chronic respiratory failure with hypoxia (H)     Acute edema of lung (H)     (HFpEF) heart failure with preserved ejection fraction (H)     Type 2 diabetes mellitus without complication, without long-term current use of insulin (H)     CAP (community acquired pneumonia)      Plan:   ## Acute hypoxic respiratory failure:  Improving  ## CAP:  Improving  Acute hypoxic respiratory failure on admission is improving, most likely secondary to CAP.  At home, patient uses up to 4 LPM with activity, usually does not use oxygen with rest.   Currently, she is on 5L.  She does not know why.  Recent history of cough, malaise and CXR showing mild streaky opacities in right lung base, consistent with infection or atelectasis.  Will continue antibiotics for what is most likely a community acquired pneumonia.  - Wean oxygen for sats > 92%.  - Continue Azithromycin, Ceftriaxone  - Lactobacillus  - Continue Prednisone (30 mg vs 10 mg home dose)  - Follow up RSV    ## ILD- Stable  (Sjogren's associated ILD and bronchiolitis obliterans. Also had lung nodules) Follows up w/ Dr. Walsh- ( Last seen on 3/22/17)  - Cont home montelukast  - Cont home Flovent  - Duoneb   - Albuterol neb prn     ## Dizziness/AMS- resolved  Likely due to infection, vs A Fib w/ RVR. MI, and stroke ruled out in ED. Pt also has Hx of alcohol abuse, but had been in remission for 30 years. Unlikely medications.     ## A Fib.  ## HFpEF  ## HTN   RVR w/ , ST elevation in aVR, ST depressions in V4-V6 on admission EKG (no significant changes from previous), eliz troponin x 2.  Good response to B-blocker given in ED.  - On tele (will discontinue today)  - Pharmacy to dose warfarin (INR on admission 1.79)  - Holding furosemide, and spironolactone due to soft BPs on admission.   - Cont metoprolol 100 mg daily.    ## Diabetes Mellitus- uncontrolled  Hgb A1c 11.6 on 4/2/17.  Blood sugars 284-406 over last 24 hours.  Plan to restart metformin today and increase lantus to 14 units daily.  Will also order diabetes education today.           Home regimen:  - Metformin 1000 mg daily     Hosp regimen:  - Metformin   - Lantus 14 units daily  - On LSI      Daily cares -   F:oral  E:stable  N:regular diet  Lines:PIV  Activity:-Up as tolerated  CODE:Full Code  Prophylaxis:On warfarin, and Lovenox (prophylactic)  PCP communication:  - Ilene Tristan     Dispo: Expected discharge date 2-3 days pending on clinical improvement.               Interval History:   Betty Tee is feeling much  better this morning.  Patient denies any confusion.  Blood sugars were elevated overnight (up to 406).  Patient is still coughing and has some abdominal soreness secondary to coughing.  Patient has increased oxygen needs (uses oxygen at home with activity, currently using oxygen at rest).  No SOB, no chest pain.  No N/V, appetite good.  No diarrhea or constipation.              Review of Systems:   Review of Systems   Constitutional: Positive for fever (last fever 4/3 at 9 am). Negative for chills.   HENT: Negative for congestion.    Respiratory: Positive for cough and shortness of breath.    Cardiovascular: Negative for chest pain.   Gastrointestinal: Positive for abdominal pain (mild abdominal pain secondary to coughing). Negative for constipation, diarrhea, nausea and vomiting.   Genitourinary: Negative for dysuria.   Neurological: Negative for dizziness and headaches.   Psychiatric/Behavioral: Negative for confusion. The patient is not nervous/anxious.             Physical Exam (Resident / Clinician):   Vitals were reviewed  Temp: 98.5  F (36.9  C) Temp src: Oral BP: 120/79   Heart Rate: 87 Resp: 20 SpO2: 100 % O2 Device: Nasal cannula Oxygen Delivery: 2 LPM    Physical Exam   Constitutional: She is oriented to person, place, and time. She appears well-developed and well-nourished. No distress.   Has NC in place, currently on 5L    HENT:   Head: Normocephalic.   Mouth/Throat: No oropharyngeal exudate.   Eyes: Conjunctivae are normal.   Neck: Normal range of motion. Neck supple.   Cardiovascular: Normal rate.    No murmur heard.  Pulmonary/Chest: Effort normal. She has wheezes (mild wheezing from anterior chest, less wheezing heard from back).   Abdominal: Soft. Bowel sounds are normal. She exhibits no distension. There is no tenderness.   Musculoskeletal: Normal range of motion. She exhibits no edema or tenderness.   Neurological: She is alert and oriented to person, place, and time.   Skin: Skin is warm and  dry.   Psychiatric: She has a normal mood and affect. Her behavior is normal. Judgment and thought content normal.   Vitals reviewed.      Intake/Output Summary (Last 24 hours) at 04/04/17 0614  Last data filed at 04/04/17 0200   Gross per 24 hour   Intake              960 ml   Output             1550 ml   Net             -590 ml           Data:   ROUTINE LABS (Last four results)  CMP    Recent Labs  Lab 04/04/17  0858 04/03/17  2019 04/03/17  0723 04/02/17 2213 04/02/17 2154 04/02/17 2147     --  144  --  141 143   POTASSIUM 3.8 4.4 3.1*  --  3.4 3.3*   CHLORIDE 106  --  108  --   --  108   CO2 24  --  24  --   --  23   ANIONGAP 8  --  11  --   --  12   *  --  311*  --  234* 213*   BUN 12  --  8  --   --  7   CR 0.62  --  0.65  --   --  0.58   GFRESTIMATED >90Non  GFR Calc  --  88 >90  --  >90Non  GFR Calc   GFRESTBLACK >90African American GFR Calc  --  >90African American GFR Calc >90  --  >90African American GFR Calc   CLAUDY 8.6  --  8.5  --   --  8.2*   MAG  --   --  2.4*  --   --   --    PROTTOTAL  --   --   --   --   --  7.0   ALBUMIN  --   --   --   --   --  3.0*   BILITOTAL  --   --   --   --   --  0.6   ALKPHOS  --   --   --   --   --  72   AST  --   --   --   --   --  29   ALT  --   --   --   --   --  19     CBC    Recent Labs  Lab 04/04/17  0650 04/02/17 2154 04/02/17 2147   WBC 4.2  --  6.4   RBC 4.02  --  4.66   HGB 8.3* 11.6* 9.9*   HCT 30.5*  --  34.6*   MCV 76*  --  74*   MCH 20.6*  --  21.2*   MCHC 27.2*  --  28.6*   RDW 18.8*  --  18.6*     --  218     INR    Recent Labs  Lab 04/04/17  0650 04/03/17  0904 04/02/17  2210 04/02/17  2147   INR 1.53* 1.72* 1.3* 1.79*     CRPNo lab results found in last 7 days.      Blood culture:  Invalid input(s): BC   Urine culture:  No results for input(s): URC in the last 168 hours.  All cultures:    Recent Labs  Lab 04/03/17  0740 04/02/17  2327 04/02/17 2211 04/02/17 2147   CULT >10 Squamous epithelial  cells/low power field indicates oral contamination. Please recollect.Canceled, Test credited* 10,000 to 50,000 colonies/mL mixed urogenital juan No growth after 2 days No growth after 2 days           Medications:     Current Facility-Administered Medications   Medication     fluticasone (FLONASE) 50 MCG/ACT spray 1-2 spray     fluticasone furoate (ARNUITY ELLIPTA) 200 MCG/ACT inhalation powder 1 puff     [Rx hold ] furosemide (LASIX) tablet 40 mg     ketoconazole (NIZORAL) 2 % cream     pantoprazole (PROTONIX) EC tablet 20-40 mg     polyethylene glycol (MIRALAX/GLYCOLAX) Packet 17 g     predniSONE (DELTASONE) tablet 30 mg     [Rx hold ] spironolactone (ALDACTONE) tablet 25 mg     montelukast (SINGULAIR) tablet 10 mg     PARoxetine (PAXIL) tablet 10 mg     Warfarin Therapy Reminder (Check START DATE - warfarin may be starting in the FUTURE)     naloxone (NARCAN) injection 0.1-0.4 mg     lidocaine 1 % 1 mL     lidocaine (LMX4) kit     sodium chloride (PF) 0.9% PF flush 3 mL     sodium chloride (PF) 0.9% PF flush 3 mL     Patient is already receiving anticoagulation with heparin, enoxaparin (LOVENOX), warfarin (COUMADIN)  or other anticoagulant medication     cefTRIAXone (ROCEPHIN) 2 g vial to attach to  ml bag for ADULTS or NS 50 ml bag for PEDS     azithromycin (ZITHROMAX) tablet 250 mg     albuterol neb solution 2.5 mg     ipratropium - albuterol 0.5 mg/2.5 mg/3 mL (DUONEB) neb solution 3 mL     guaiFENesin-dextromethorphan (ROBITUSSIN DM) 100-10 MG/5ML syrup 10 mL     potassium chloride SA (K-DUR/KLOR-CON M) CR tablet 20-40 mEq     potassium chloride (KLOR-CON) Packet 20-40 mEq     potassium chloride 10 mEq in 100 mL intermittent infusion     potassium chloride 10 mEq in 100 mL intermittent infusion with 10 mg lidocaine     potassium chloride 20 mEq in 50 mL intermittent infusion     metoprolol (TOPROL-XL) 24 hr tablet 100 mg     enoxaparin (LOVENOX) injection 40 mg     insulin glargine (LANTUS) injection  10 Units     acetaminophen (TYLENOL) tablet 650 mg    Or     acetaminophen (TYLENOL) Suppository 650 mg     glucose 40 % gel 15-30 g    Or     dextrose 50 % injection 25-50 mL    Or     glucagon injection 1 mg     insulin aspart (NovoLOG) inj (RAPID ACTING)     insulin aspart (NovoLOG) inj (RAPID ACTING)       Caring Physician: Beatriz Painting MD  [facility]  Pager Contact: see Physician sticky note

## 2017-04-05 ENCOUNTER — APPOINTMENT (OUTPATIENT)
Dept: GENERAL RADIOLOGY | Facility: CLINIC | Age: 77
DRG: 193 | End: 2017-04-05
Attending: HOSPITALIST
Payer: MEDICARE

## 2017-04-05 LAB
ANION GAP SERPL CALCULATED.3IONS-SCNC: 8 MMOL/L (ref 3–14)
BACTERIA SPEC CULT: NORMAL
BASOPHILS # BLD AUTO: 0 10E9/L (ref 0–0.2)
BASOPHILS NFR BLD AUTO: 0.2 %
BUN SERPL-MCNC: 13 MG/DL (ref 7–30)
CALCIUM SERPL-MCNC: 8.6 MG/DL (ref 8.5–10.1)
CHLORIDE SERPL-SCNC: 106 MMOL/L (ref 94–109)
CO2 SERPL-SCNC: 25 MMOL/L (ref 20–32)
CREAT SERPL-MCNC: 0.68 MG/DL (ref 0.52–1.04)
DIFFERENTIAL METHOD BLD: ABNORMAL
EOSINOPHIL # BLD AUTO: 0.1 10E9/L (ref 0–0.7)
EOSINOPHIL NFR BLD AUTO: 1 %
ERYTHROCYTE [DISTWIDTH] IN BLOOD BY AUTOMATED COUNT: 18.7 % (ref 10–15)
GFR SERPL CREATININE-BSD FRML MDRD: 85 ML/MIN/1.7M2
GLUCOSE BLDC GLUCOMTR-MCNC: 134 MG/DL (ref 70–99)
GLUCOSE BLDC GLUCOMTR-MCNC: 187 MG/DL (ref 70–99)
GLUCOSE BLDC GLUCOMTR-MCNC: 248 MG/DL (ref 70–99)
GLUCOSE BLDC GLUCOMTR-MCNC: 254 MG/DL (ref 70–99)
GLUCOSE BLDC GLUCOMTR-MCNC: 308 MG/DL (ref 70–99)
GLUCOSE SERPL-MCNC: 130 MG/DL (ref 70–99)
HCT VFR BLD AUTO: 30.4 % (ref 35–47)
HGB BLD-MCNC: 8.4 G/DL (ref 11.7–15.7)
IMM GRANULOCYTES # BLD: 0 10E9/L (ref 0–0.4)
IMM GRANULOCYTES NFR BLD: 0.3 %
INR PPP: 1.67 (ref 0.86–1.14)
KCT BLD-ACNC: 169 SEC (ref 105–167)
LYMPHOCYTES # BLD AUTO: 1.3 10E9/L (ref 0.8–5.3)
LYMPHOCYTES NFR BLD AUTO: 21.1 %
MAGNESIUM SERPL-MCNC: 2 MG/DL (ref 1.6–2.3)
MCH RBC QN AUTO: 20.5 PG (ref 26.5–33)
MCHC RBC AUTO-ENTMCNC: 27.6 G/DL (ref 31.5–36.5)
MCV RBC AUTO: 74 FL (ref 78–100)
MICRO REPORT STATUS: NORMAL
MONOCYTES # BLD AUTO: 0.4 10E9/L (ref 0–1.3)
MONOCYTES NFR BLD AUTO: 7 %
MRSA DNA SPEC QL NAA+PROBE: NORMAL
NEUTROPHILS # BLD AUTO: 4.4 10E9/L (ref 1.6–8.3)
NEUTROPHILS NFR BLD AUTO: 70.4 %
NRBC # BLD AUTO: 0 10*3/UL
NRBC BLD AUTO-RTO: 0 /100
PHOSPHATE SERPL-MCNC: 2.4 MG/DL (ref 2.5–4.5)
PLATELET # BLD AUTO: 191 10E9/L (ref 150–450)
POTASSIUM SERPL-SCNC: 4 MMOL/L (ref 3.4–5.3)
RBC # BLD AUTO: 4.09 10E12/L (ref 3.8–5.2)
SODIUM SERPL-SCNC: 140 MMOL/L (ref 133–144)
SPECIMEN SOURCE: NORMAL
SPECIMEN SOURCE: NORMAL
WBC # BLD AUTO: 6.3 10E9/L (ref 4–11)

## 2017-04-05 PROCEDURE — 00000146 ZZHCL STATISTIC GLUCOSE BY METER IP

## 2017-04-05 PROCEDURE — 25000125 ZZHC RX 250: Performed by: HOSPITALIST

## 2017-04-05 PROCEDURE — 71020 XR CHEST 2 VW: CPT

## 2017-04-05 PROCEDURE — 25000132 ZZH RX MED GY IP 250 OP 250 PS 637: Mod: GY | Performed by: STUDENT IN AN ORGANIZED HEALTH CARE EDUCATION/TRAINING PROGRAM

## 2017-04-05 PROCEDURE — 25000125 ZZHC RX 250: Performed by: STUDENT IN AN ORGANIZED HEALTH CARE EDUCATION/TRAINING PROGRAM

## 2017-04-05 PROCEDURE — 87641 MR-STAPH DNA AMP PROBE: CPT | Performed by: FAMILY MEDICINE

## 2017-04-05 PROCEDURE — 85610 PROTHROMBIN TIME: CPT | Performed by: HOSPITALIST

## 2017-04-05 PROCEDURE — 94640 AIRWAY INHALATION TREATMENT: CPT | Mod: 76 | Performed by: OPTOMETRIST

## 2017-04-05 PROCEDURE — 85025 COMPLETE CBC W/AUTO DIFF WBC: CPT | Performed by: HOSPITALIST

## 2017-04-05 PROCEDURE — A9270 NON-COVERED ITEM OR SERVICE: HCPCS | Mod: GY | Performed by: HOSPITALIST

## 2017-04-05 PROCEDURE — 84100 ASSAY OF PHOSPHORUS: CPT | Performed by: HOSPITALIST

## 2017-04-05 PROCEDURE — 94640 AIRWAY INHALATION TREATMENT: CPT

## 2017-04-05 PROCEDURE — 80048 BASIC METABOLIC PNL TOTAL CA: CPT | Performed by: HOSPITALIST

## 2017-04-05 PROCEDURE — A9270 NON-COVERED ITEM OR SERVICE: HCPCS | Mod: GY | Performed by: STUDENT IN AN ORGANIZED HEALTH CARE EDUCATION/TRAINING PROGRAM

## 2017-04-05 PROCEDURE — 25000128 H RX IP 250 OP 636: Performed by: FAMILY MEDICINE

## 2017-04-05 PROCEDURE — 85347 COAGULATION TIME ACTIVATED: CPT

## 2017-04-05 PROCEDURE — 25000132 ZZH RX MED GY IP 250 OP 250 PS 637: Mod: GY | Performed by: HOSPITALIST

## 2017-04-05 PROCEDURE — 36415 COLL VENOUS BLD VENIPUNCTURE: CPT | Performed by: HOSPITALIST

## 2017-04-05 PROCEDURE — 83735 ASSAY OF MAGNESIUM: CPT | Performed by: HOSPITALIST

## 2017-04-05 PROCEDURE — 87640 STAPH A DNA AMP PROBE: CPT | Performed by: FAMILY MEDICINE

## 2017-04-05 PROCEDURE — 94640 AIRWAY INHALATION TREATMENT: CPT | Mod: 76

## 2017-04-05 PROCEDURE — 40000275 ZZH STATISTIC RCP TIME EA 10 MIN: Performed by: OPTOMETRIST

## 2017-04-05 PROCEDURE — 25000132 ZZH RX MED GY IP 250 OP 250 PS 637: Mod: GY | Performed by: FAMILY MEDICINE

## 2017-04-05 PROCEDURE — A9270 NON-COVERED ITEM OR SERVICE: HCPCS | Mod: GY | Performed by: FAMILY MEDICINE

## 2017-04-05 PROCEDURE — 21400006 ZZH R&B CCU INTERMEDIATE UMMC

## 2017-04-05 RX ORDER — OSELTAMIVIR PHOSPHATE 75 MG/1
75 CAPSULE ORAL 2 TIMES DAILY
Status: DISPENSED | OUTPATIENT
Start: 2017-04-05 | End: 2017-04-09

## 2017-04-05 RX ORDER — FUROSEMIDE 20 MG/1
10 TABLET ORAL DAILY
Status: DISCONTINUED | OUTPATIENT
Start: 2017-04-06 | End: 2017-04-07

## 2017-04-05 RX ORDER — SPIRONOLACTONE 25 MG/1
25 TABLET ORAL DAILY
Status: DISCONTINUED | OUTPATIENT
Start: 2017-04-06 | End: 2017-04-11 | Stop reason: HOSPADM

## 2017-04-05 RX ORDER — IPRATROPIUM BROMIDE AND ALBUTEROL SULFATE 2.5; .5 MG/3ML; MG/3ML
3 SOLUTION RESPIRATORY (INHALATION)
Status: DISCONTINUED | OUTPATIENT
Start: 2017-04-05 | End: 2017-04-06

## 2017-04-05 RX ADMIN — IPRATROPIUM BROMIDE AND ALBUTEROL SULFATE 3 ML: .5; 3 SOLUTION RESPIRATORY (INHALATION) at 19:44

## 2017-04-05 RX ADMIN — ENOXAPARIN SODIUM 40 MG: 40 INJECTION SUBCUTANEOUS at 10:33

## 2017-04-05 RX ADMIN — ACETAMINOPHEN 1000 MG: 325 TABLET, FILM COATED ORAL at 18:08

## 2017-04-05 RX ADMIN — IPRATROPIUM BROMIDE AND ALBUTEROL SULFATE 3 ML: .5; 3 SOLUTION RESPIRATORY (INHALATION) at 15:38

## 2017-04-05 RX ADMIN — Medication 1 CAPSULE: at 13:01

## 2017-04-05 RX ADMIN — OSELTAMIVIR PHOSPHATE 75 MG: 75 CAPSULE ORAL at 19:41

## 2017-04-05 RX ADMIN — Medication 1 CAPSULE: at 18:08

## 2017-04-05 RX ADMIN — MONTELUKAST SODIUM 10 MG: 10 TABLET, FILM COATED ORAL at 19:42

## 2017-04-05 RX ADMIN — IPRATROPIUM BROMIDE AND ALBUTEROL SULFATE 3 ML: .5; 3 SOLUTION RESPIRATORY (INHALATION) at 07:43

## 2017-04-05 RX ADMIN — ACETAMINOPHEN 1000 MG: 325 TABLET, FILM COATED ORAL at 01:41

## 2017-04-05 RX ADMIN — PREDNISONE 30 MG: 20 TABLET ORAL at 08:38

## 2017-04-05 RX ADMIN — METFORMIN HYDROCHLORIDE 1000 MG: 500 TABLET, EXTENDED RELEASE ORAL at 18:08

## 2017-04-05 RX ADMIN — PAROXETINE HYDROCHLORIDE 10 MG: 10 TABLET, FILM COATED ORAL at 19:43

## 2017-04-05 RX ADMIN — Medication 1 CAPSULE: at 08:38

## 2017-04-05 RX ADMIN — GUAIFENESIN AND DEXTROMETHORPHAN 10 ML: 100; 10 SYRUP ORAL at 08:31

## 2017-04-05 RX ADMIN — IPRATROPIUM BROMIDE AND ALBUTEROL SULFATE 3 ML: .5; 3 SOLUTION RESPIRATORY (INHALATION) at 11:36

## 2017-04-05 RX ADMIN — FLUTICASONE FUROATE 1 PUFF: 200 POWDER RESPIRATORY (INHALATION) at 08:50

## 2017-04-05 RX ADMIN — GUAIFENESIN AND DEXTROMETHORPHAN 10 ML: 100; 10 SYRUP ORAL at 13:09

## 2017-04-05 RX ADMIN — WARFARIN SODIUM 9 MG: 6 TABLET ORAL at 18:08

## 2017-04-05 RX ADMIN — AZITHROMYCIN 250 MG: 250 TABLET, FILM COATED ORAL at 01:41

## 2017-04-05 RX ADMIN — OSELTAMIVIR PHOSPHATE 75 MG: 75 CAPSULE ORAL at 10:32

## 2017-04-05 RX ADMIN — PANTOPRAZOLE SODIUM 20 MG: 20 TABLET, DELAYED RELEASE ORAL at 08:38

## 2017-04-05 RX ADMIN — ACETAMINOPHEN 975 MG: 325 TABLET, FILM COATED ORAL at 10:37

## 2017-04-05 RX ADMIN — METOPROLOL SUCCINATE 100 MG: 100 TABLET, FILM COATED, EXTENDED RELEASE ORAL at 08:38

## 2017-04-05 ASSESSMENT — ENCOUNTER SYMPTOMS
ABDOMINAL PAIN: 0
FEVER: 0
NERVOUS/ANXIOUS: 0
CHILLS: 0
CONSTIPATION: 0
VOMITING: 0
HEADACHES: 0
DIARRHEA: 0
DYSURIA: 0
NAUSEA: 0
CONFUSION: 0
COUGH: 1
SHORTNESS OF BREATH: 1
DIZZINESS: 0

## 2017-04-05 NOTE — PLAN OF CARE
Problem: Goal Outcome Summary  Goal: Goal Outcome Summary  Outcome: No Change     Pt admitted for CAP and found to be influenza B positive, on droplet iso. VSSA, on 2L via NC per pt request for comfort overnight (SaO2 96% on RA). Off tele. BG at 2245 was 343, corrected with one time order for 5 units novolog, recheck at 0200 was 187. Prn robitussin given x1 for frequent cough. Up independently to bathroom with adequate UO. Continue to monitor and notify [facility] with pertinent changes.

## 2017-04-05 NOTE — PLAN OF CARE
Problem: Goal Outcome Summary  Goal: Goal Outcome Summary  OT/6C:  Cancel - Pt receiving diabetes education upon PM attempt

## 2017-04-05 NOTE — CONSULTS
Diabetes Education    Received consult request to see this 76 year old female for diabetes education.  Patient with history of  Sjogren's Syndrome, interstitial lung disease, DM type II, HFpEF, DVT on Coumadin, left ventricular hypertrophy, afib on Metoprolol, and HTN who presented to the ED after her niece call EMS due to concern for AMS, and dizziness.   Hemoglobin A1c on 4/4/17 was 14.3%.  Patient started on insulin therapy.  Current insulin regimen is:  Insulin glargine (Lantus) 14 units daily  Insulin aspart 1 unit/15 grams CHO  Medium correction scale pre-meal and hs.    Patient also on metformin xr 1000 mg daily and prednisone 30 mg daily.    Met with patient and roommate/friend Yvette.  Patient states she has had some diabetes education as outpatient, but found the information overwhelming.  Sounds like information was focused on carbohydrate counting and label reading.  She has not checked blood glucoses at home.  She has given herself some sort of injections in the past, but doesn't recall name or purpose of injections.    During session, patient easily distractible, looking [place], going to toilet, and also took 3 phone calls.  Friend Yvette is hard of hearing.  Yvette took some notes as we talked.    Today focused session on use of blood glucose monitor. Betty was able to do a fingerstick and obtain result.  Discussed that for discharge we can come up with a less complex insulin plan and she was relieved to hear this.    Will see again tomorrow afternoon at 2 pm when Yvette can be present.  At this point, am concerned about Betty being safe to go home.  Discussed with RN.    Meghna Stone MS RN CDE CDTC  [phone]

## 2017-04-05 NOTE — PROGRESS NOTES
[facility] Medicine - Inpatient daily progress note    Date of admission: 4/2/2017  Date of admission: 4/2/2017  Date of service: 4/5/2017.    Updates:   - Start tamiflu  - Add prandial insulin coverage  - Repeat CXR  - Check MRSA, consider procalcitonin  - Add robitussin for coughing           Assessment and Plan:   Assessment:   Betty Tee is a 76 year old who was admitted AMS and  for evaluation of persistent cough, dizziness and found to have a possible resp infection on CXR.    Patient Active Problem List   Diagnosis     Temporomandibular joint disorder     Diverticulitis of colon     Disorder of bone and cartilage     Osteoarthritis     Alcohol abuse, in remission     Restless legs syndrome (RLS)     Major depressive disorder, recurrent episode, moderate     Essential hypertension with goal blood pressure less than 140/90     CARDIOVASCULAR SCREENING; LDL GOAL LESS THAN 130     Sciatica     Advanced directives, counseling/discussion     Colouterine fistula     Atrial fibrillation with controlled ventricular response (H)     Left ventricular hypertrophy     Health Care Home     Insomnia     Joint pains     Allergic reaction caused by a drug - likely plaquenil     Breast fibroadenoma     DVT (deep venous thrombosis) (H)     Fatty liver disease, nonalcoholic     Rib pain     Neck mass     Gastroesophageal reflux disease without esophagitis     Enlarged lymph node     Sjogren's syndrome (H)     ANGELICA (obstructive sleep apnea)     ILD (interstitial lung disease) (H)     Lower GI bleed     Acute and chronic respiratory failure with hypoxia (H)     Acute edema of lung (H)     (HFpEF) heart failure with preserved ejection fraction (H)     Type 2 diabetes mellitus without complication, without long-term current use of insulin (H)     CAP (community acquired pneumonia)      Plan:   ## Acute hypoxic respiratory failure:  Improving  ## Influenza  ## Possible CAP:  Acute hypoxic respiratory failure on was  clinically improving yesterday, but patient has increased coughing today.  Her influenza B test came back positive yesterday.  Will start Tamiflu. Her symptoms are of viral in etiology however due to possible superimposed bacterial infection and high risk factor for bacterial infection (ILD)  will continue current antibiotics.     Plan:   - Treat Flu , will start tamiflu   - Wean oxygen for sats > 92%.  - CXR today  - Continue Azithromycin, Ceftriaxone  - Check for MRSA  - Lactobacillus  - Continue Prednisone (30 mg vs 10 mg home dose)  - Consider checking procal    ## ILD- Stable  (Sjogren's associated ILD and bronchiolitis obliterans. Also had lung nodules) Follows up w/ Dr. Walsh- ( Last seen on 3/22/17)  Plan:   - Cont home montelukast  - Cont home Flovent  - Duoneb   - Albuterol neb prn     ## Dizziness/AMS- resolved  Likely multifactorial : Hypoxia and acute respiratory illness. Negative ACS and stroke work up.   Plan:   - address triggering factor      ## A Fib ,rate controlled   Plan:   - will continue Metoprolol   - Continue warfarin , INR not at goal 1. 67 , dosing c/o Pharmacy     ## HFpEF, Euvolemic   Plan:   Will restart Home lasix 40 mg and Spironolactone 25 mg daily   Daily weights   I and O     ## HTN , within acceptable ranges   Plan:   - Cont metoprolol 100 mg daily.    ## Diabetes Mellitus- uncontrolled  Hgb A1c 14.3 (4/2017).  Blood sugars 280s-520s over last 24 hours.  Plan:   - To continue Metformin 1000 mg , may consider maximizing dose   - SSI  - DM education ordered      Hosp regimen:  - Metformin 1000 mg daily  - Lantus 14 units daily  - Add prandial insuline (1 unit per 16 grams of carbs)  [place] [facility]     Daily cares -   F:oral  E:stable  N:regular diet  Lines:PIV  Activity:-Up as tolerated  CODE:Full Code  Prophylaxis:On warfarin, and Lovenox (prophylactic)  PCP communication:  - Ilene Tristan     Dispo: Expected discharge date 2-3 days pending on clinical improvement.              "Interval History:   Betty Tee states she feels \"jumpy\".  She did not sleep well overnight due to coughing.    Blood sugars were elevated overnight (up to 523).                Review of Systems:   Review of Systems   Constitutional: Negative for chills and fever.   HENT: Negative for congestion.    Respiratory: Positive for cough and shortness of breath.    Cardiovascular: Negative for chest pain.   Gastrointestinal: Negative for abdominal pain, constipation, diarrhea, nausea and vomiting.   Genitourinary: Negative for dysuria.   Neurological: Negative for dizziness and headaches.   Psychiatric/Behavioral: Negative for confusion. The patient is not nervous/anxious.             Physical Exam (Resident / Clinician):   Vitals were reviewed  Temp: 99  F (37.2  C) Temp src: Oral BP: (!) 131/91 Pulse: 86 Heart Rate: 101 Resp: 15 SpO2: 100 % O2 Device: Nasal cannula Oxygen Delivery: 2 LPM    Physical Exam   Constitutional: She is oriented to person, place, and time. She appears well-developed and well-nourished. No distress.   Has NC in place, currently on 2L    HENT:   Head: Normocephalic.   Mouth/Throat: No oropharyngeal exudate.   Eyes: Conjunctivae are normal.   Neck: Normal range of motion. Neck supple.   Cardiovascular: Normal rate.    No murmur heard.  Pulmonary/Chest: Effort normal. She has wheezes (moderate-severe wheezing diffusely).   Abdominal: Soft. Bowel sounds are normal. She exhibits no distension. There is no tenderness.   Musculoskeletal: Normal range of motion. She exhibits no edema or tenderness.   Neurological: She is alert and oriented to person, place, and time.   Skin: Skin is warm and dry.   Psychiatric: She has a normal mood and affect. Her behavior is normal. Judgment and thought content normal.   Vitals reviewed.          Data:   ROUTINE LABS (Last four results)  CMP    Recent Labs  Lab 04/05/17  0726 04/04/17  0858 04/03/17  2019 04/03/17  0723 04/02/17  2213 04/02/17  2154 " 04/02/17 2147    138  --  144  --  141 143   POTASSIUM 4.0 3.8 4.4 3.1*  --  3.4 3.3*   CHLORIDE 106 106  --  108  --   --  108   CO2 25 24  --  24  --   --  23   ANIONGAP 8 8  --  11  --   --  12   * 215*  --  311*  --  234* 213*   BUN 13 12  --  8  --   --  7   CR 0.68 0.62  --  0.65  --   --  0.58   GFRESTIMATED 85 >90Non  GFR Calc  --  88 >90  --  >90Non  GFR Calc   GFRESTBLACK >90African American GFR Calc >90African American GFR Calc  --  >90African American GFR Calc >90  --  >90African American GFR Calc   CLAUDY 8.6 8.6  --  8.5  --   --  8.2*   MAG 2.0  --   --  2.4*  --   --   --    PHOS 2.4*  --   --   --   --   --   --    PROTTOTAL  --   --   --   --   --   --  7.0   ALBUMIN  --   --   --   --   --   --  3.0*   BILITOTAL  --   --   --   --   --   --  0.6   ALKPHOS  --   --   --   --   --   --  72   AST  --   --   --   --   --   --  29   ALT  --   --   --   --   --   --  19     CBC    Recent Labs  Lab 04/05/17 0726 04/04/17 0650 04/02/17 2154 04/02/17 2147   WBC 6.3 4.2  --  6.4   RBC 4.09 4.02  --  4.66   HGB 8.4* 8.3* 11.6* 9.9*   HCT 30.4* 30.5*  --  34.6*   MCV 74* 76*  --  74*   MCH 20.5* 20.6*  --  21.2*   MCHC 27.6* 27.2*  --  28.6*   RDW 18.7* 18.8*  --  18.6*    188  --  218     INR    Recent Labs  Lab 04/05/17 0726 04/04/17 0650 04/03/17  0904 04/02/17  2210   INR 1.67* 1.53* 1.72* 1.3*     CRP    Recent Labs  Lab 04/04/17  0858   CRP 54.0*         Recent Labs  Lab 04/03/17  1045 04/03/17  0740 04/02/17  2327 04/02/17  2211 04/02/17  2147   CULT Light growth Normal juan to date >10 Squamous epithelial cells/low power field indicates oral contamination. Please recollect.Canceled, Test credited* 10,000 to 50,000 colonies/mL mixed urogenital juan No growth after 3 days No growth after 3 days           Medications:     Current Facility-Administered Medications   Medication     acetaminophen (TYLENOL) tablet 1,000 mg    Or     acetaminophen  (TYLENOL) Suppository 650 mg     metFORMIN (GLUCOPHAGE-XR) 24 hr tablet 1,000 mg     insulin glargine (LANTUS) injection 14 Units     lactobacillus rhamnosus (GG) (CULTURELL) capsule 1 capsule     fluticasone (FLONASE) 50 MCG/ACT spray 1-2 spray     fluticasone furoate (ARNUITY ELLIPTA) 200 MCG/ACT inhalation powder 1 puff     [Rx hold ] furosemide (LASIX) tablet 40 mg     ketoconazole (NIZORAL) 2 % cream     pantoprazole (PROTONIX) EC tablet 20-40 mg     polyethylene glycol (MIRALAX/GLYCOLAX) Packet 17 g     predniSONE (DELTASONE) tablet 30 mg     [Rx hold ] spironolactone (ALDACTONE) tablet 25 mg     montelukast (SINGULAIR) tablet 10 mg     PARoxetine (PAXIL) tablet 10 mg     Warfarin Therapy Reminder (Check START DATE - warfarin may be starting in the FUTURE)     naloxone (NARCAN) injection 0.1-0.4 mg     lidocaine 1 % 1 mL     lidocaine (LMX4) kit     sodium chloride (PF) 0.9% PF flush 3 mL     sodium chloride (PF) 0.9% PF flush 3 mL     Patient is already receiving anticoagulation with heparin, enoxaparin (LOVENOX), warfarin (COUMADIN)  or other anticoagulant medication     cefTRIAXone (ROCEPHIN) 2 g vial to attach to  ml bag for ADULTS or NS 50 ml bag for PEDS     azithromycin (ZITHROMAX) tablet 250 mg     albuterol neb solution 2.5 mg     ipratropium - albuterol 0.5 mg/2.5 mg/3 mL (DUONEB) neb solution 3 mL     guaiFENesin-dextromethorphan (ROBITUSSIN DM) 100-10 MG/5ML syrup 10 mL     potassium chloride SA (K-DUR/KLOR-CON M) CR tablet 20-40 mEq     potassium chloride (KLOR-CON) Packet 20-40 mEq     potassium chloride 10 mEq in 100 mL intermittent infusion     potassium chloride 10 mEq in 100 mL intermittent infusion with 10 mg lidocaine     potassium chloride 20 mEq in 50 mL intermittent infusion     metoprolol (TOPROL-XL) 24 hr tablet 100 mg     enoxaparin (LOVENOX) injection 40 mg     glucose 40 % gel 15-30 g    Or     dextrose 50 % injection 25-50 mL    Or     glucagon injection 1 mg     insulin  aspart (NovoLOG) inj (RAPID ACTING)     insulin aspart (NovoLOG) inj (RAPID ACTING)       Caring Physician: Beatriz Painting MD  [facility], [facility]  Pager Contact: see Physician sticky note

## 2017-04-05 NOTE — PROGRESS NOTES
Care Coordinator Progress Note     Admission Date/Time:  4/2/2017  Attending MD:  Abhishek Elizabeth MD     Data  Chart reviewed, discussed with interdisciplinary team.   Patient was admitted for:    Pneumonia due to infectious organism, unspecified laterality, unspecified part of lung  Atrial fibrillation with rapid ventricular response (H)  Fever, unspecified  Other pneumonia, unspecified organism  Atrial fibrillation, unspecified type (H)  Essential hypertension, malignant  Personal history of venous thrombosis and embolism  Long term (current) use of anticoagulants.  DM type II    Concerns with insurance coverage for discharge needs: None.  Current Living Situation: Patient lives with adult .  Support System: Supportive and Involved  Services Involved: none  Transportation: Family or Friend will provide  Barriers to Discharge: pending medical clearance    Universal Utilization:   ED Visits in last year: Yes 4  Hospital visits in last year: Yes 3  Last PCP appointment: 3/8/17  Missed Appointments: 0    Clinical Concerns:  Current Medical Concerns: DM on insulin gtt, Infl B, CAP, higher O2 liter flow  Current Social/psychosocial/physicial Care coordination Concerns: ability to meet needs at home.  Few steps into house.  Lives with roommate, another nun.  States she has no needs at home and is able to walk up the 3 steps into the home.      Coordination of Care and Referrals: Provided patient/family with options for DME.       Assessment  Brianna is a independent 77 yo female admitted with dx of CAP, found to have Infl B and elevated BG, placed on an insulin gtt and abx.  Order in place for DM education.  Per chart review pt has been seeing Pharm D at [facility] for DM education @ [facility] whom she should see after dc for a follow up if meds are changed.  Discussed living situation with pt, states she feels safe and does not need any HHC currently.  She lives with a roommate.  Pt has  [facility] for home O2, recently md updated orders from pulm clinic notes (order placed to resume).      ___________________________  [facility]  Phone  [phone]  Fax  [phone]  ___________________________    [facility] for INR/ coumadin management, not new this admission.  HX of DVT.       Plan  Anticipated Discharge Date:  TBD    Iliana Mack, RNCC, BSN    [facility]    Medicine Group  [address] [place] [address]    [email or web address]  [email or web address]    Office: [phone] Pager: [phone]

## 2017-04-05 NOTE — PLAN OF CARE
Problem: Goal Outcome Summary  Goal: Goal Outcome Summary  Outcome: Improving  /63 (BP Location: Right arm)  Pulse 72  Temp 99.5  F (37.5  C) (Oral)  Resp 16  Wt 94.7 kg (208 lb 11.2 oz)  SpO2 98%  BMI 32.95 kg/m2     D- pt admitted with influenza B, pneumonia complicating preexisting ILD.  A/O, some short term forgetfulness, standby assist, nasal canula 2L, Afib.    Recently diagnosis of diabetes.  Elevated but improved BG.  Persistent, productive cough.     I-  Administered Robitussin x2.  Tylenol for elevated temperature.  Encouraged small meals.  Administered lantis, Afsaneh log carb count.  Pt and friend Yvette meeting with Diabetes educator.  Swabbed nose for MRSA.     A- Patient reports no pain, lack of sleep related to cough.  Motivation improved through the day.  BG below 250 through shift, protocol for carbs was added.       P-  Continue with plan of care.  Encourage small meals, and activity as tolerated.

## 2017-04-05 NOTE — PLAN OF CARE
Problem: Goal Outcome Summary  Goal: Goal Outcome Summary  Outcome: No Change  D/I: Pt with CAP and influenza continues on droplet precautions with dry hacky cough somewhat relieved with cough syrup. Very elevated blood sugar before supper (523). MD called and 10 units of insulin given right with supper.2 hours later BG was 457 and at 20:42 it was 418. MD called again, will recheck her at 22:45 and call MD again to either give her more sq insulin or start her on an insulin drip. Otherwise VSS and pt up ad jolanta in the room and to the BR.  A: Stable with elevated blood sugars.  P: Recheck blood sugar and call MD with results. Monitor and assist as needed.

## 2017-04-05 NOTE — PLAN OF CARE
Problem: Goal Outcome Summary  Goal: Goal Outcome Summary  OT/6C:  Attempted to see pt this AM to initiate OT evaluation.  Pt unable to participate at time of attempt due to experiencing coughing spells, nausea, and emesis, RN present.  Will check back as schedule permits.

## 2017-04-06 ENCOUNTER — APPOINTMENT (OUTPATIENT)
Dept: GENERAL RADIOLOGY | Facility: CLINIC | Age: 77
DRG: 193 | End: 2017-04-06
Payer: MEDICARE

## 2017-04-06 ENCOUNTER — APPOINTMENT (OUTPATIENT)
Dept: ULTRASOUND IMAGING | Facility: CLINIC | Age: 77
DRG: 193 | End: 2017-04-06
Attending: FAMILY MEDICINE
Payer: MEDICARE

## 2017-04-06 ENCOUNTER — APPOINTMENT (OUTPATIENT)
Dept: CARDIOLOGY | Facility: CLINIC | Age: 77
DRG: 193 | End: 2017-04-06
Attending: FAMILY MEDICINE
Payer: MEDICARE

## 2017-04-06 LAB
ANION GAP SERPL CALCULATED.3IONS-SCNC: 10 MMOL/L (ref 3–14)
ANION GAP SERPL CALCULATED.3IONS-SCNC: 8 MMOL/L (ref 3–14)
BASE DEFICIT BLDA-SCNC: 1.8 MMOL/L
BASE DEFICIT BLDV-SCNC: 2.5 MMOL/L
BASOPHILS # BLD AUTO: 0 10E9/L (ref 0–0.2)
BASOPHILS NFR BLD AUTO: 0.3 %
BUN SERPL-MCNC: 11 MG/DL (ref 7–30)
BUN SERPL-MCNC: 12 MG/DL (ref 7–30)
CALCIUM SERPL-MCNC: 7.8 MG/DL (ref 8.5–10.1)
CALCIUM SERPL-MCNC: 8.3 MG/DL (ref 8.5–10.1)
CHLORIDE SERPL-SCNC: 107 MMOL/L (ref 94–109)
CHLORIDE SERPL-SCNC: 107 MMOL/L (ref 94–109)
CO2 SERPL-SCNC: 23 MMOL/L (ref 20–32)
CO2 SERPL-SCNC: 25 MMOL/L (ref 20–32)
CREAT SERPL-MCNC: 0.61 MG/DL (ref 0.52–1.04)
CREAT SERPL-MCNC: 0.68 MG/DL (ref 0.52–1.04)
CRP SERPL-MCNC: 64 MG/L (ref 0–8)
DIFFERENTIAL METHOD BLD: ABNORMAL
EOSINOPHIL # BLD AUTO: 0 10E9/L (ref 0–0.7)
EOSINOPHIL NFR BLD AUTO: 0.2 %
ERYTHROCYTE [DISTWIDTH] IN BLOOD BY AUTOMATED COUNT: 19 % (ref 10–15)
GFR SERPL CREATININE-BSD FRML MDRD: 83 ML/MIN/1.7M2
GFR SERPL CREATININE-BSD FRML MDRD: ABNORMAL ML/MIN/1.7M2
GLUCOSE BLDC GLUCOMTR-MCNC: 166 MG/DL (ref 70–99)
GLUCOSE BLDC GLUCOMTR-MCNC: 185 MG/DL (ref 70–99)
GLUCOSE BLDC GLUCOMTR-MCNC: 202 MG/DL (ref 70–99)
GLUCOSE BLDC GLUCOMTR-MCNC: 208 MG/DL (ref 70–99)
GLUCOSE BLDC GLUCOMTR-MCNC: 227 MG/DL (ref 70–99)
GLUCOSE BLDC GLUCOMTR-MCNC: 247 MG/DL (ref 70–99)
GLUCOSE BLDC GLUCOMTR-MCNC: 292 MG/DL (ref 70–99)
GLUCOSE SERPL-MCNC: 213 MG/DL (ref 70–99)
GLUCOSE SERPL-MCNC: 291 MG/DL (ref 70–99)
HCO3 BLD-SCNC: 23 MMOL/L (ref 21–28)
HCO3 BLDV-SCNC: 24 MMOL/L (ref 21–28)
HCT VFR BLD AUTO: 33.4 % (ref 35–47)
HGB BLD-MCNC: 9.5 G/DL (ref 11.7–15.7)
IMM GRANULOCYTES # BLD: 0 10E9/L (ref 0–0.4)
IMM GRANULOCYTES NFR BLD: 0.2 %
INR PPP: 2.02 (ref 0.86–1.14)
LACTATE BLD-SCNC: 2.3 MMOL/L (ref 0.7–2.1)
LACTATE BLD-SCNC: 3 MMOL/L (ref 0.7–2.1)
LYMPHOCYTES # BLD AUTO: 1.1 10E9/L (ref 0.8–5.3)
LYMPHOCYTES NFR BLD AUTO: 19.4 %
MAGNESIUM SERPL-MCNC: 2 MG/DL (ref 1.6–2.3)
MAGNESIUM SERPL-MCNC: 2.1 MG/DL (ref 1.6–2.3)
MCH RBC QN AUTO: 21.2 PG (ref 26.5–33)
MCHC RBC AUTO-ENTMCNC: 28.4 G/DL (ref 31.5–36.5)
MCV RBC AUTO: 75 FL (ref 78–100)
MONOCYTES # BLD AUTO: 0.6 10E9/L (ref 0–1.3)
MONOCYTES NFR BLD AUTO: 10.6 %
NEUTROPHILS # BLD AUTO: 4 10E9/L (ref 1.6–8.3)
NEUTROPHILS NFR BLD AUTO: 69.3 %
NRBC # BLD AUTO: 0 10*3/UL
NRBC BLD AUTO-RTO: 0 /100
NT-PROBNP SERPL-MCNC: 3531 PG/ML (ref 0–1800)
O2/TOTAL GAS SETTING VFR VENT: 40 %
O2/TOTAL GAS SETTING VFR VENT: 40 %
OXYHGB MFR BLDV: 34 %
PCO2 BLD: 39 MM HG (ref 35–45)
PCO2 BLDV: 51 MM HG (ref 40–50)
PH BLD: 7.38 PH (ref 7.35–7.45)
PH BLDV: 7.28 PH (ref 7.32–7.43)
PHOSPHATE SERPL-MCNC: 2.2 MG/DL (ref 2.5–4.5)
PHOSPHATE SERPL-MCNC: 3.2 MG/DL (ref 2.5–4.5)
PLATELET # BLD AUTO: 216 10E9/L (ref 150–450)
PO2 BLD: 122 MM HG (ref 80–105)
PO2 BLDV: 25 MM HG (ref 25–47)
POTASSIUM SERPL-SCNC: 3.9 MMOL/L (ref 3.4–5.3)
POTASSIUM SERPL-SCNC: 4 MMOL/L (ref 3.4–5.3)
PROCALCITONIN SERPL-MCNC: NORMAL NG/ML
RBC # BLD AUTO: 4.48 10E12/L (ref 3.8–5.2)
SODIUM SERPL-SCNC: 140 MMOL/L (ref 133–144)
SODIUM SERPL-SCNC: 140 MMOL/L (ref 133–144)
TROPONIN I SERPL-MCNC: 0.1 UG/L (ref 0–0.04)
TROPONIN I SERPL-MCNC: 0.11 UG/L (ref 0–0.04)
TROPONIN I SERPL-MCNC: 0.14 UG/L (ref 0–0.04)
WBC # BLD AUTO: 5.8 10E9/L (ref 4–11)

## 2017-04-06 PROCEDURE — 25000128 H RX IP 250 OP 636: Performed by: FAMILY MEDICINE

## 2017-04-06 PROCEDURE — 25000125 ZZHC RX 250: Performed by: HOSPITALIST

## 2017-04-06 PROCEDURE — 36415 COLL VENOUS BLD VENIPUNCTURE: CPT | Performed by: HOSPITALIST

## 2017-04-06 PROCEDURE — 93306 TTE W/DOPPLER COMPLETE: CPT | Mod: 26 | Performed by: INTERNAL MEDICINE

## 2017-04-06 PROCEDURE — 84145 PROCALCITONIN (PCT): CPT | Performed by: STUDENT IN AN ORGANIZED HEALTH CARE EDUCATION/TRAINING PROGRAM

## 2017-04-06 PROCEDURE — 82803 BLOOD GASES ANY COMBINATION: CPT | Performed by: FAMILY MEDICINE

## 2017-04-06 PROCEDURE — 83605 ASSAY OF LACTIC ACID: CPT | Performed by: HOSPITALIST

## 2017-04-06 PROCEDURE — 84484 ASSAY OF TROPONIN QUANT: CPT | Performed by: HOSPITALIST

## 2017-04-06 PROCEDURE — 85610 PROTHROMBIN TIME: CPT | Performed by: STUDENT IN AN ORGANIZED HEALTH CARE EDUCATION/TRAINING PROGRAM

## 2017-04-06 PROCEDURE — 25000125 ZZHC RX 250: Performed by: FAMILY MEDICINE

## 2017-04-06 PROCEDURE — 25000132 ZZH RX MED GY IP 250 OP 250 PS 637: Mod: GY | Performed by: FAMILY MEDICINE

## 2017-04-06 PROCEDURE — 93010 ELECTROCARDIOGRAM REPORT: CPT | Performed by: INTERNAL MEDICINE

## 2017-04-06 PROCEDURE — 85025 COMPLETE CBC W/AUTO DIFF WBC: CPT | Performed by: STUDENT IN AN ORGANIZED HEALTH CARE EDUCATION/TRAINING PROGRAM

## 2017-04-06 PROCEDURE — 71010 XR CHEST PORT 1 VW: CPT

## 2017-04-06 PROCEDURE — A9270 NON-COVERED ITEM OR SERVICE: HCPCS | Mod: GY | Performed by: HOSPITALIST

## 2017-04-06 PROCEDURE — 80048 BASIC METABOLIC PNL TOTAL CA: CPT | Performed by: FAMILY MEDICINE

## 2017-04-06 PROCEDURE — 40000275 ZZH STATISTIC RCP TIME EA 10 MIN

## 2017-04-06 PROCEDURE — 84100 ASSAY OF PHOSPHORUS: CPT | Performed by: FAMILY MEDICINE

## 2017-04-06 PROCEDURE — 25000132 ZZH RX MED GY IP 250 OP 250 PS 637: Mod: GY | Performed by: HOSPITALIST

## 2017-04-06 PROCEDURE — 25000132 ZZH RX MED GY IP 250 OP 250 PS 637: Mod: GY | Performed by: STUDENT IN AN ORGANIZED HEALTH CARE EDUCATION/TRAINING PROGRAM

## 2017-04-06 PROCEDURE — 93005 ELECTROCARDIOGRAM TRACING: CPT

## 2017-04-06 PROCEDURE — 25000125 ZZHC RX 250: Performed by: STUDENT IN AN ORGANIZED HEALTH CARE EDUCATION/TRAINING PROGRAM

## 2017-04-06 PROCEDURE — A9270 NON-COVERED ITEM OR SERVICE: HCPCS | Mod: GY | Performed by: FAMILY MEDICINE

## 2017-04-06 PROCEDURE — 83605 ASSAY OF LACTIC ACID: CPT | Performed by: FAMILY MEDICINE

## 2017-04-06 PROCEDURE — 00000146 ZZHCL STATISTIC GLUCOSE BY METER IP

## 2017-04-06 PROCEDURE — 80048 BASIC METABOLIC PNL TOTAL CA: CPT | Performed by: HOSPITALIST

## 2017-04-06 PROCEDURE — A9270 NON-COVERED ITEM OR SERVICE: HCPCS | Mod: GY | Performed by: STUDENT IN AN ORGANIZED HEALTH CARE EDUCATION/TRAINING PROGRAM

## 2017-04-06 PROCEDURE — 82805 BLOOD GASES W/O2 SATURATION: CPT | Performed by: HOSPITALIST

## 2017-04-06 PROCEDURE — 87040 BLOOD CULTURE FOR BACTERIA: CPT | Performed by: HOSPITALIST

## 2017-04-06 PROCEDURE — 93970 EXTREMITY STUDY: CPT

## 2017-04-06 PROCEDURE — 20000004 ZZH R&B ICU UMMC

## 2017-04-06 PROCEDURE — 83880 ASSAY OF NATRIURETIC PEPTIDE: CPT | Performed by: FAMILY MEDICINE

## 2017-04-06 PROCEDURE — 25000131 ZZH RX MED GY IP 250 OP 636 PS 637: Mod: GY | Performed by: FAMILY MEDICINE

## 2017-04-06 PROCEDURE — 94640 AIRWAY INHALATION TREATMENT: CPT | Mod: 76 | Performed by: OPTOMETRIST

## 2017-04-06 PROCEDURE — 25000128 H RX IP 250 OP 636: Performed by: STUDENT IN AN ORGANIZED HEALTH CARE EDUCATION/TRAINING PROGRAM

## 2017-04-06 PROCEDURE — 83735 ASSAY OF MAGNESIUM: CPT | Performed by: FAMILY MEDICINE

## 2017-04-06 PROCEDURE — 25000128 H RX IP 250 OP 636

## 2017-04-06 PROCEDURE — 94640 AIRWAY INHALATION TREATMENT: CPT | Mod: 76

## 2017-04-06 PROCEDURE — 83735 ASSAY OF MAGNESIUM: CPT | Performed by: HOSPITALIST

## 2017-04-06 PROCEDURE — 25000125 ZZHC RX 250

## 2017-04-06 PROCEDURE — 25500064 ZZH RX 255 OP 636: Performed by: HOSPITALIST

## 2017-04-06 PROCEDURE — 36600 WITHDRAWAL OF ARTERIAL BLOOD: CPT

## 2017-04-06 PROCEDURE — 25000128 H RX IP 250 OP 636: Performed by: HOSPITALIST

## 2017-04-06 PROCEDURE — 40000264 ECHO COMPLETE WITH OPTISON

## 2017-04-06 PROCEDURE — 36415 COLL VENOUS BLD VENIPUNCTURE: CPT | Performed by: STUDENT IN AN ORGANIZED HEALTH CARE EDUCATION/TRAINING PROGRAM

## 2017-04-06 PROCEDURE — 86140 C-REACTIVE PROTEIN: CPT | Performed by: FAMILY MEDICINE

## 2017-04-06 PROCEDURE — 84484 ASSAY OF TROPONIN QUANT: CPT | Performed by: FAMILY MEDICINE

## 2017-04-06 PROCEDURE — 84100 ASSAY OF PHOSPHORUS: CPT | Performed by: HOSPITALIST

## 2017-04-06 PROCEDURE — 94640 AIRWAY INHALATION TREATMENT: CPT | Performed by: OPTOMETRIST

## 2017-04-06 PROCEDURE — 40000275 ZZH STATISTIC RCP TIME EA 10 MIN: Performed by: OPTOMETRIST

## 2017-04-06 PROCEDURE — 40000281 ZZH STATISTIC TRANSPORT TIME EA 15 MIN

## 2017-04-06 PROCEDURE — 94660 CPAP INITIATION&MGMT: CPT | Performed by: OPTOMETRIST

## 2017-04-06 RX ORDER — FUROSEMIDE 10 MG/ML
20 INJECTION INTRAMUSCULAR; INTRAVENOUS ONCE
Status: COMPLETED | OUTPATIENT
Start: 2017-04-06 | End: 2017-04-06

## 2017-04-06 RX ORDER — PIPERACILLIN SODIUM, TAZOBACTAM SODIUM 3; .375 G/15ML; G/15ML
3.38 INJECTION, POWDER, LYOPHILIZED, FOR SOLUTION INTRAVENOUS EVERY 6 HOURS
Status: DISCONTINUED | OUTPATIENT
Start: 2017-04-06 | End: 2017-04-08

## 2017-04-06 RX ORDER — METHYLPREDNISOLONE ACETATE 80 MG/ML
120 INJECTION, SUSPENSION INTRA-ARTICULAR; INTRALESIONAL; INTRAMUSCULAR; SOFT TISSUE ONCE
Status: DISCONTINUED | OUTPATIENT
Start: 2017-04-06 | End: 2017-04-06

## 2017-04-06 RX ORDER — WARFARIN SODIUM 7.5 MG/1
7.5 TABLET ORAL
Status: COMPLETED | OUTPATIENT
Start: 2017-04-06 | End: 2017-04-06

## 2017-04-06 RX ORDER — METHYLPREDNISOLONE SODIUM SUCCINATE 125 MG/2ML
125 INJECTION, POWDER, LYOPHILIZED, FOR SOLUTION INTRAMUSCULAR; INTRAVENOUS EVERY 6 HOURS
Status: DISCONTINUED | OUTPATIENT
Start: 2017-04-06 | End: 2017-04-07

## 2017-04-06 RX ORDER — ACETYLCYSTEINE 200 MG/ML
2 SOLUTION ORAL; RESPIRATORY (INHALATION) EVERY 4 HOURS
Status: COMPLETED | OUTPATIENT
Start: 2017-04-06 | End: 2017-04-06

## 2017-04-06 RX ORDER — IPRATROPIUM BROMIDE AND ALBUTEROL SULFATE 2.5; .5 MG/3ML; MG/3ML
3 SOLUTION RESPIRATORY (INHALATION) EVERY 4 HOURS
Status: DISCONTINUED | OUTPATIENT
Start: 2017-04-06 | End: 2017-04-06

## 2017-04-06 RX ORDER — IPRATROPIUM BROMIDE AND ALBUTEROL SULFATE 2.5; .5 MG/3ML; MG/3ML
3 SOLUTION RESPIRATORY (INHALATION)
Status: DISCONTINUED | OUTPATIENT
Start: 2017-04-06 | End: 2017-04-06

## 2017-04-06 RX ORDER — IPRATROPIUM BROMIDE AND ALBUTEROL SULFATE 2.5; .5 MG/3ML; MG/3ML
3 SOLUTION RESPIRATORY (INHALATION)
Status: DISCONTINUED | OUTPATIENT
Start: 2017-04-06 | End: 2017-04-09

## 2017-04-06 RX ADMIN — METHYLPREDNISOLONE SODIUM SUCCINATE 125 MG: 125 INJECTION, POWDER, LYOPHILIZED, FOR SOLUTION INTRAMUSCULAR; INTRAVENOUS at 17:24

## 2017-04-06 RX ADMIN — METHYLPREDNISOLONE SODIUM SUCCINATE 125 MG: 125 INJECTION, POWDER, LYOPHILIZED, FOR SOLUTION INTRAMUSCULAR; INTRAVENOUS at 13:32

## 2017-04-06 RX ADMIN — OSELTAMIVIR PHOSPHATE 75 MG: 75 CAPSULE ORAL at 20:49

## 2017-04-06 RX ADMIN — VANCOMYCIN HYDROCHLORIDE 2000 MG: 10 INJECTION, POWDER, LYOPHILIZED, FOR SOLUTION INTRAVENOUS at 21:48

## 2017-04-06 RX ADMIN — PANTOPRAZOLE SODIUM 40 MG: 40 INJECTION, POWDER, FOR SOLUTION INTRAVENOUS at 10:02

## 2017-04-06 RX ADMIN — ACETYLCYSTEINE 2 ML: 200 SOLUTION ORAL; RESPIRATORY (INHALATION) at 03:22

## 2017-04-06 RX ADMIN — INSULIN ASPART 1 UNITS: 100 INJECTION, SOLUTION INTRAVENOUS; SUBCUTANEOUS at 17:37

## 2017-04-06 RX ADMIN — CEFTRIAXONE 2 G: 2 INJECTION, POWDER, FOR SOLUTION INTRAMUSCULAR; INTRAVENOUS at 00:40

## 2017-04-06 RX ADMIN — IPRATROPIUM BROMIDE AND ALBUTEROL SULFATE 3 ML: .5; 3 SOLUTION RESPIRATORY (INHALATION) at 21:26

## 2017-04-06 RX ADMIN — IPRATROPIUM BROMIDE AND ALBUTEROL SULFATE 3 ML: .5; 3 SOLUTION RESPIRATORY (INHALATION) at 03:23

## 2017-04-06 RX ADMIN — ALBUTEROL SULFATE 2.5 MG: 2.5 SOLUTION RESPIRATORY (INHALATION) at 00:43

## 2017-04-06 RX ADMIN — IPRATROPIUM BROMIDE AND ALBUTEROL SULFATE 3 ML: .5; 3 SOLUTION RESPIRATORY (INHALATION) at 09:46

## 2017-04-06 RX ADMIN — INSULIN ASPART 1 UNITS: 100 INJECTION, SOLUTION INTRAVENOUS; SUBCUTANEOUS at 13:41

## 2017-04-06 RX ADMIN — ACETYLCYSTEINE 2 ML: 200 SOLUTION ORAL; RESPIRATORY (INHALATION) at 07:16

## 2017-04-06 RX ADMIN — PAROXETINE HYDROCHLORIDE 10 MG: 10 TABLET, FILM COATED ORAL at 20:49

## 2017-04-06 RX ADMIN — MONTELUKAST SODIUM 10 MG: 10 TABLET, FILM COATED ORAL at 20:49

## 2017-04-06 RX ADMIN — AZITHROMYCIN 250 MG: 250 TABLET, FILM COATED ORAL at 00:39

## 2017-04-06 RX ADMIN — VANCOMYCIN HYDROCHLORIDE 2000 MG: 10 INJECTION, POWDER, LYOPHILIZED, FOR SOLUTION INTRAVENOUS at 13:40

## 2017-04-06 RX ADMIN — FUROSEMIDE 20 MG: 10 INJECTION, SOLUTION INTRAVENOUS at 07:57

## 2017-04-06 RX ADMIN — FUROSEMIDE 20 MG: 10 INJECTION, SOLUTION INTRAVENOUS at 22:26

## 2017-04-06 RX ADMIN — PIPERACILLIN AND TAZOBACTAM 3.38 G: 3; .375 INJECTION, POWDER, LYOPHILIZED, FOR SOLUTION INTRAVENOUS; PARENTERAL at 10:01

## 2017-04-06 RX ADMIN — IPRATROPIUM BROMIDE AND ALBUTEROL SULFATE 3 ML: .5; 3 SOLUTION RESPIRATORY (INHALATION) at 07:16

## 2017-04-06 RX ADMIN — GUAIFENESIN AND DEXTROMETHORPHAN 10 ML: 100; 10 SYRUP ORAL at 01:08

## 2017-04-06 RX ADMIN — ALBUTEROL SULFATE 2.5 MG: 2.5 SOLUTION RESPIRATORY (INHALATION) at 05:44

## 2017-04-06 RX ADMIN — ALBUTEROL SULFATE 2.5 MG: 2.5 SOLUTION RESPIRATORY (INHALATION) at 16:52

## 2017-04-06 RX ADMIN — IPRATROPIUM BROMIDE AND ALBUTEROL SULFATE 3 ML: .5; 3 SOLUTION RESPIRATORY (INHALATION) at 13:37

## 2017-04-06 RX ADMIN — KETOCONAZOLE: 20 CREAM TOPICAL at 21:17

## 2017-04-06 RX ADMIN — INSULIN ASPART 2 UNITS: 100 INJECTION, SOLUTION INTRAVENOUS; SUBCUTANEOUS at 10:12

## 2017-04-06 RX ADMIN — WARFARIN SODIUM 7.5 MG: 7.5 TABLET ORAL at 18:47

## 2017-04-06 RX ADMIN — METOPROLOL SUCCINATE 100 MG: 100 TABLET, FILM COATED, EXTENDED RELEASE ORAL at 07:03

## 2017-04-06 RX ADMIN — PIPERACILLIN AND TAZOBACTAM 3.38 G: 3; .375 INJECTION, POWDER, LYOPHILIZED, FOR SOLUTION INTRAVENOUS; PARENTERAL at 20:44

## 2017-04-06 RX ADMIN — GUAIFENESIN AND DEXTROMETHORPHAN 10 ML: 100; 10 SYRUP ORAL at 04:48

## 2017-04-06 RX ADMIN — INSULIN ASPART 4 UNITS: 100 INJECTION, SOLUTION INTRAVENOUS; SUBCUTANEOUS at 20:52

## 2017-04-06 RX ADMIN — PIPERACILLIN AND TAZOBACTAM 3.38 G: 3; .375 INJECTION, POWDER, LYOPHILIZED, FOR SOLUTION INTRAVENOUS; PARENTERAL at 15:25

## 2017-04-06 RX ADMIN — METHYLPREDNISOLONE SODIUM SUCCINATE 125 MG: 125 INJECTION, POWDER, LYOPHILIZED, FOR SOLUTION INTRAMUSCULAR; INTRAVENOUS at 22:31

## 2017-04-06 RX ADMIN — ACETAMINOPHEN 1000 MG: 325 TABLET, FILM COATED ORAL at 00:39

## 2017-04-06 RX ADMIN — FUROSEMIDE 20 MG: 10 INJECTION, SOLUTION INTRAVENOUS at 17:24

## 2017-04-06 RX ADMIN — ENOXAPARIN SODIUM 40 MG: 40 INJECTION SUBCUTANEOUS at 10:13

## 2017-04-06 RX ADMIN — HUMAN ALBUMIN MICROSPHERES AND PERFLUTREN 6 ML: 10; .22 INJECTION, SOLUTION INTRAVENOUS at 10:30

## 2017-04-06 ASSESSMENT — ENCOUNTER SYMPTOMS
COUGH: 1
NERVOUS/ANXIOUS: 0
DIARRHEA: 0
FEVER: 0
LIGHT-HEADEDNESS: 1
ABDOMINAL PAIN: 0
NAUSEA: 0
VOMITING: 0
CHILLS: 0
SHORTNESS OF BREATH: 1
CONFUSION: 1
HEADACHES: 0
DIZZINESS: 0
CONSTIPATION: 0

## 2017-04-06 NOTE — PROGRESS NOTES
Cross cover note  [facility]    Called by nurse to assess patient secondary to severe respiratory distress, post-tussive emesis, and a-fib with RVR with rate in 160's    Over the last hour patient has become progressively tachypneic and was started on BiPAP by respiratory therapy.  She had some coughing leading to gagging leading to emesis.  During this her HR went up as high as 160 but is down to 109 currently.  She is breating hard, in acute respiratory distress, using accessory muscles with RR of 35 on BiPAP.      Plan: STAT EKG, CXR, ABG, CBC, CMP, CRP, Pro-calcitonin, Lactic acid, BNP.  Will need higher level of care, ICU or step down unit.  Will discuss with dayteam.  Dayteam will follow up labs, imaging, EKG and re-assess.   Continue BiPAP, give home oral metoprolol now (~1hr early).  Will consider heparin drip +/- CTA chest to rule out PE if other tests non-diagnostic.    Martell Estrada

## 2017-04-06 NOTE — PHARMACY-VANCOMYCIN DOSING SERVICE
Pharmacy Vancomycin Initial Note  Date of Service 2017  Patient's  1940  76 year old, female    Indication: Sepsis    Current estimated CrCl = Estimated Creatinine Clearance: 82.8 mL/min (based on Cr of 0.68).    Creatinine for last 3 days  2017:  8:58 AM Creatinine 0.62 mg/dL  2017:  7:26 AM Creatinine 0.68 mg/dL    Recent Vancomycin Level(s) for last 3 days  No results found for requested labs within last 72 hours.      Vancomycin IV Administrations (past 72 hours)      No vancomycin orders with administrations in past 72 hours.                Nephrotoxins and other renal medications (Future)    Start     Dose/Rate Route Frequency Ordered Stop    17 0900  vancomycin (VANCOCIN) 2,000 mg in NaCl 0.9 % 500 mL intermittent infusion      2,000 mg Intravenous EVERY 12 HOURS 17 0757      17 0800  furosemide (LASIX) half-tab 10 mg      10 mg Oral DAILY 17 0800  piperacillin-tazobactam (ZOSYN) 3.375 g vial to attach to  mL bag      3.375 g  over 1 Hours Intravenous EVERY 6 HOURS 17 0751            Contrast Orders - past 72 hours     None                Plan:  1.  Start vancomycin  2000 mg IV q12h.   2.  Goal Trough Level: 15-20 mg/L   3.  Pharmacy will check trough levels as appropriate in 1-3 Days.    4. Serum creatinine levels will be ordered daily for the first week of therapy and at least twice weekly for subsequent weeks.    5. [place] method utilized to dose vancomycin therapy: Method 1    Christian Ley

## 2017-04-06 NOTE — PROGRESS NOTES
SPIRITUAL HEALTH SERVICES Progress Note  [facility] ([place] 4A       DATA:    Initial visit with pt and her two friends per [facility] referral as a Hindu. Pt is a [employer] sister of [employer] in [facility]. According to her friends she had few health issues and she didn't sleep since two days. Since pt was asleep, we decided to let her sleep and to pray quietly at her bedside. Later I met with another sister friend on the hallway. We engaged in reflective conversation and prayed together. She asked me to give her the anointing of the sick, which I did.       INTERVENTION:    Introduced SHS; assessed pt's needs of SHS; engaged pt's friends in reflective conversation integrating pt's illness and spiritual life; offered spiritual support and shared a healing prayer.       OUTCOME:    Pt's friends welcomed SHS and appreciated the visit. They asked me to visit with pt tomorrow again and gave her the sacrament of the sick when she is more alert.       PLAN:    Hindu priests will continue to f/u 1-2x/wk while pt remains in ICU.                                                                                                                                             Father Ricardo Crandall

## 2017-04-06 NOTE — CONSULTS
"       Cardiology Inpatient Consultation  April 6, 2017    Reason for Consult:  A cardiology consult was requested by Dr. Beatriz Painting from the [facility] service to provide clinical guidance regarding new abnormal Echo findings and elevated Trop.    HPI:   Betty Tee is a 76 year old female with a past medication history of Sjogren's associated ILD and bronchiolitis obliterans, a.fib with controlled ventricular respone, LVH, ILD, HFpEF, DM type 2, ANGELICA, DVT who was admitted for AMS, dizziness and acute hypoxia respiratory failure, influenza positive with concern for CAP. On admission ACS and stroke work up negative. She was started on antibiotics, tamiflu and her daily home prednisone was increased. This morning patient had a.fib with RVR with rate in 160's, post-tussive emesis and episode of severe respiratory failure requiring 100% bipap. Rapid response was called, lactic acid 3.0 and she was then transferred to  for higher level of care. Troponin was slightly elevated, EKG was normal. CXR showed findings consistent with possible pulmonary edema/fluid overload. Echo was completed and showed new basal to mid anteroseptal akinesis and primary team consulted Cards for further evaluation.     Patient is followed by Dr. Aubrey Hurtado, last office visit 1/26/2016; Dr. Radha Rosa, last office visit 1/12/2017, Dr. Meño Walsh, last office visit 3/2/2017.      At the time of interview, the patient denies chest pain, PND, palpitations, lightheadedness, or syncope. Reports diffuse muscle aches, unable to get into a comfortable position, dyspnea at rest or with exertion, orthopnea and fatigue. She is unable to localize any chest pain asked \"why is everyone worried about my heart\"?     Review of Systems:    Complete review of systems was performed and negative except per HPI.    PMH:  Past Medical History:   Diagnosis Date     Alcohol abuse, in remission      Allergic rhinitis, cause unspecified     " allegra helps when she takes it     Antiplatelet or antithrombotic long-term use      Atrial fibrillation (H)     in hosp in 11/11 after surgery w/ fluid overload     Cardiomegaly     LVH on stress echo- cardiac w/u at [facility] ER- neg CT scan for PE, neg stress echo in 8/06     Chest pain, unspecified      Disorder of bone and cartilage, unspecified     osteopenia (had been on prempro), improved on 6/06 dexa, stable dexa 11/10     Diverticulosis of colon (without mention of hemorrhage)     last episode yrs ago     Essential hypertension, benign      Gastro-oesophageal reflux disease      Insomnia, unspecified     weaned off clonazepam     Irregular heart beat      Lumbago 7/09    MRI with DJD, now seeing Dr. 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 Alvarado.  She was first diagnosed with A.fib in 10/2011 during an inpatient stay for diverticulitis which ultimately required partial colectomy, Her A.fib at that time presented w/ RVR and pulmonary edema. She was treated with IV metoprolol and diuretics, followied by diltiazem and ultimately discharged on warfarin. Aside from pulmonary edema, A.fib was not symptomatic. TTE 10/2011 showed LVEF=55-60%, diastolic dysfunction/high filling pressures with no other significant structural nidus for A.fib. She had paroxysmal A.fib subsequently after discharge from that admission. Phone.com event monitor 1/21/13-2/3/13 showed 1 episode of  palpitations, correlated with 1 episode of A.fib lasting 4 hrs with VR up to 132. More recently, her A.fib has been present on all ECGs and has been felt to be persistent/permanent. She has been managed with a rate control strategy and anticoagulation with warfarin. Her prior therapies have included diltiazem CD (stopped earlier this year.)       Diverticulitis of colon 09/24/2004     [facility]: High     Multiple complications, surgeries, and hospitalizations from ~9/11-12/11 (see 12/09/11 note for overview).  A.fib started during with fluid overload during one of these hospitalizations.  Colostomy takedown in 2/12.  F/u colonoscopy in 12/12 (Dr. Canseco) looked good- rec next f/u colonoscopy in 10 yrs.  Problem list name updated by automated process. Provider to review       CAP (community acquired pneumonia) 04/03/2017     [facility]: Medium     Type 2 diabetes mellitus without complication, without long-term current use of insulin (H) 10/24/2016     [facility]: Medium     (HFpEF) heart failure with preserved ejection fraction (H) 08/27/2016     [facility]: Medium     7/16 Cardiology notes- She was admitted again 7/2-7/4/16  with hypoxemia, which was felt to be related to HFpEF. She was noted in this setting to be in asymptomatic A.fib, [id number]. Toprol XL was increased to 50 mg daily. As she had shown no evidence of recurrent GI bleeding, warfarin was resumed on discharge. She was scheduled for EP follow up to address A.fib. Of note, she was also diagnosed with ANGELICA and started on CPAP at this time which she has been using regularly.  Assessment- Sister Sita continues to experience NYHA class II-III exertional dyspnea and appears modestly hypervolemic on exam today. We have instructed her to increase her furosemide to 40 mg qAM/20mg qPM for the next 2 days, and arranged for her to establish care with [facility] within 1 week with BMP at that time.       Acute edema of lung (H) 07/04/2016     [facility]: Medium      Acute and chronic respiratory failure with hypoxia (H) 07/02/2016     [facility]: Medium     Lower GI bleed 06/26/2016     [facility]: Medium     Hospitalized 6/26-28/16 with [facility].  Colonoscopy with Dr. Siddiqi intended for after hospital stay, but pt had multiple complications including readmission on 7/2/16 for respiratory failure due to fluid overload and uncontrolled a.fib.  Hgb dropped from 13's to 9's, then to low of 8.5 during second hospitalization.  Colonoscopy f/u not done due to medical complications.  Hgb's slowly improving though not back to baseline (10's).  12/16 consult with Dr. Siddiqi- ludwin of pt's ability to safely tolerate a colonoscopy or colon surgery.  **Rec considering cologuard test to stratify her risk- will discuss with pt at upcoming visit/s.       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  Followed by [facility] and [facility]ology.  Worsening PFT's in 10/15 despite increase in prednisone from 5mg/d to 10mg/d.    ILD conference- thought Follicular bronchiolitis -started on flovent, azithromycin and montelukast in 4/16.       ANGELICA (obstructive sleep apnea) 04/12/2016     [facility]: Medium     Sleep study in '16, mild sleep apnea, but significant sleep hypoventilation.  Started on CPAP, not helpful, so put on bilevel PAP through Dr. Chandler.       Sjogren's syndrome (H) 10/30/2015     [facility]: Medium     Dx in '15 by rheum- likely primary Sjogrens syndrome- 'with borderline positive lip bx, low titer RG, low titer SSA ab, sicca symptoms, interstitial lung disease w/ reticular opacities in lungs- paratracheal lymphadenopathy, elevated CRP'.    Rheum rec txt per pulmonary, rec f/u with rheum in 4/16.   Sx's likely since '12 with migrating joint pains, seen by rheum in '12 (Dr. Ray), dx with inflammatory jt disease, on 0-5mg of prednisone since then with minimal f/u.  Worsening SOB since early '15, lung  lesions, PFT's with 50% decreased function- referred to pulmonary.  Has been on more stable prednisone 5mg/d for few months prior to 10/15 f/u PFT's stable, which remained stable.  Will cont f/u through pulmonary and rheum.           Enlarged lymph node 08/04/2015     [facility]: Medium     Gastroesophageal reflux disease without esophagitis 07/23/2015     [facility]: Medium     Neck mass 04/28/2015     [facility]: Medium     4/7/15 CT scan- prominent lymph nodes- rec f/u clinically, or f/u CT scan in 3-6 months.  Also noted apical lung changes- air trapping vs interstitial pulmonary edema (referred to pulmonary- dx with Sjogren's after seen by rheumatology).  6/15 CT scan- stable neck node, indeterminant- no f/u rec.  Possible related to the Sjogren's.    8/15- FNA of neck mass by ENT, Dr. Polk, no signs of malignancy/lymphoma.  F/u with 9/15 with Dr. Polk - focus on lip/cheek bx's.  10/15- pt notes mass is still there, but stable.         Rib pain 10/31/2014     [facility]: Medium     S/p horrible fall down stairs in 8/14- hospitalized for a few days, then in rehab for a couple weeks.  Significant swelling, no fx's.  INR dropped for a bit between hosp/rehab, and pt developed DVT in 9/14 in R leg.       Fatty liver disease, nonalcoholic 09/24/2014     [facility]: Medium     DVT (deep venous thrombosis) (H) 09/15/2014     [facility]: Medium     Dx on 9/11/14 via u/s- worsening sx's over prior few days.  10/14 hematology consult- felt that the DVT was not a coumadin failure- attributed to immobility s/p fall, lower INR during hospitalization/rehab stay.    Rec continued long-term coumadin therapy (due to a.fib), f/u LE u/s in 3-6- months (nl f/u u/s in 4/15), and compression stockings x 2 yrs (pt compliant).       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       Allergic reaction caused by a drug - likely plaquenil 04/30/2013     [facility]: Medium     Pt seen at [facility] 5/13/13-  plan to do prednisone 7.5mg daily until able to wean down (Dr. Kevin Marie).  Blood tests q3 months.       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  If she goes down too fast- gets shoulder, hip and generalized joint pains.  [facility]  4/15- prednisone at 2.5mg/d  Referred to [facility]- dx with Sjogrens, and sx's stabilized on steady dose of prednisone 5mg/d (10/15).       Insomnia 05/26/2012     [facility]: Medium     Left ventricular hypertrophy 11/04/2011     [facility]: Medium     LVH on stress echo- cardiac w/u at [facility] ER- neg CT scan for PE, neg stress echo in 8/06        Sciatica 11/03/2010     [facility]: Medium     Was seeing Dr. Conklin (PMNR) - was on neurontin and zanaflex at night- helping her sleep.  Last MRI in ~6/09.  Improved after 11/11 surgery- stopped meds.       Essential hypertension with goal blood pressure less than 140/90 10/19/2010     [facility]: Medium     Lisinopril and diltiazem (through cards), HCTZ.       Major depressive disorder, recurrent episode, moderate      [facility]: Medium     Paxil 10mg/d for years- increased to 20mg/d in 8/14.  Trial off in 10/15 (concerned with wt gain).  Tried off paxil in 5/09, restarted in 7/09.  Weaned off clonazepam.           Restless legs syndrome (RLS) 09/06/2007     [facility]: Medium     Health Care Home 02/09/2012     [facility]: Low              Advanced directives, counseling/discussion 11/01/2011     [facility]: Low     Received outside advance directive.  HCD:Previously signed by patient and notarized by [employer].  scanned into EMR as Advance Directive/Living Will document. View document and details in Code Status History Report. Please see advance directive for specifics.   Health care directive (FIVE WISHES) reviewed and documented.  5/10/12 MALIKA Aguilar LPN        Received  outside DNR form.    scanned and placed behind media tab.  Please see DNR form for specifics. Routed to certified facilitator for review and further documentation.  DNR form reviewed and documented.  11/1/11 MALIKA Aguilar LPN  In media tab under date 10/14/11.  Lev Tristan MD           CARDIOVASCULAR SCREENING; LDL GOAL LESS THAN 130 10/31/2010     [facility]: Low     Disorder of bone and cartilage 09/24/2004     [facility]: Low     Osteopenia in '10, recheck '15.  Risk factors of PPI use and prednisone use since '12 (0-5mg/d).  Should check Vit D levels as well.       Osteoarthritis 09/24/2004     [facility]: Low     Problem list name updated by automated process. Provider to review       Alcohol abuse, in remission 09/24/2004     [facility]: Low     Temporomandibular joint disorder 11/24/2003     [facility]: Low     Problem list name updated by automated process. Provider to review       Social History:  Social History   Substance Use Topics     Smoking status: Former Smoker     Packs/day: 0.50     Years: 10.00     Types: Cigarettes     Quit date: 8/1/2011     Smokeless tobacco: Never Used      Comment: 1/2 ppd     Alcohol use No      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     Family History:  Family History   Problem Relation Age of Onset     C.A.D. Mother 63     MI- first at age 63     C.A.D. Sister 52     Minor MI- age 50's     HEART DISEASE Mother      HEART DISEASE Sister      Hypertension Mother      Hypertension Sister      Hypertension Sister      Hypertension Brother      CEREBROVASCULAR DISEASE Mother      Cancer - colorectal Sister 48     Late 40's early 50's     Prostate Cancer Brother 74     Dx'd age 74     Alcohol/Drug Father      Alzheimer Disease Father      GASTROINTESTINAL DISEASE Sister      Diverticulitis     GASTROINTESTINAL DISEASE Brother      Diverticulitis     Lipids Sister      Lipids Sister      Parkinsonism Brother      DIABETES Sister      HEART DISEASE Sister      CHF     CANCER  Sister      lung, smoker     Substance Abuse Sister      Substance Abuse Brother      Dementia Father      Asthma Sister      CANCER Sister      Substance Abuse Sister      Substance Abuse Brother      Asthma Sister      Hypertension Father      Breast Cancer Daughter      Prostate Cancer Brother      Hyperlipidemia Mother      Hyperlipidemia Father      Hyperlipidemia Brother        Medications:    piperacillin-tazobactam  3.375 g Intravenous Q6H     vancomycin (VANCOCIN) IV  2,000 mg Intravenous Q12H     pantoprazole  40 mg Intravenous QAM AC     ipratropium - albuterol 0.5 mg/2.5 mg/3 mL  3 mL Nebulization Q2H     insulin aspart  1-6 Units Subcutaneous Q4H     [START ON 4/7/2017] insulin glargine  7 Units Subcutaneous QAM AC     methylPREDNISolone sodium succinate  125 mg Intravenous Q6H     warfarin  7.5 mg Oral ONCE at 18:00     oseltamivir  75 mg Oral BID     insulin aspart   Subcutaneous QAM AC     insulin aspart   Subcutaneous Daily with lunch     insulin aspart   Subcutaneous Daily with supper     furosemide  10 mg Oral Daily     spironolactone  25 mg Oral Daily     acetaminophen  1,000 mg Oral Q8H    Or     acetaminophen  650 mg Rectal Q8H     lactobacillus rhamnosus (GG)  1 capsule Oral TID AC     fluticasone furoate  1 puff Inhalation Daily     ketoconazole   Topical BID     montelukast  10 mg Oral At Bedtime     PARoxetine  10 mg Oral At Bedtime     sodium chloride (PF)  3 mL Intracatheter Q8H     metoprolol  100 mg Oral Daily     enoxaparin  40 mg Subcutaneous Q24H     insulin aspart  1-5 Units Subcutaneous At Bedtime         Warfarin Therapy Reminder       - MEDICATION INSTRUCTIONS -         Physical Exam:  Temp:  [96.6  F (35.9  C)-99.3  F (37.4  C)] 99.2  F (37.3  C)  Pulse:  [] 87  Heart Rate:  [] 105  Resp:  [16-36] 22  BP: (126-162)/() 126/94  FiO2 (%):  [40 %] 40 %  SpO2:  [97 %-100 %] 97 %    Intake/Output Summary (Last 24 hours) at 04/06/17 3619  Last data filed at 04/06/17  1045   Gross per 24 hour   Intake              485 ml   Output             2175 ml   Net            -1690 ml     GEN: arousable, falls to sleep and is easily arousable, fidgety, unable to get still in bed.   HEENT: no icterus  CV: RRR, normal s1/s2, no murmurs/rubs/s3/s4, no heave.  CHEST: wheezing heard throughout lung bases, coarse rhonchi diffusely in right lung.   ABD: soft, non-tender, normal active bowel sounds  EXTR: pulses palpable. No clubbing, cyanosis or significant edema.   NEURO: arousable, interacted, answering questions, but not consistently appropriately. Seems intermittently confused which was confirmed by friend at bedside.     Diagnostics:  All labs and imaging were reviewed, of note:    Lab Results   Component Value Date    NTBNPI 3531 (H) 04/06/2017    NTBNP 755 (H) 11/03/2016     Lab Results   Component Value Date    WBC 5.8 04/06/2017    HGB 9.5 (L) 04/06/2017    HCT 33.4 (L) 04/06/2017    MCV 75 (L) 04/06/2017     04/06/2017     Lab Results   Component Value Date     04/06/2017    POTASSIUM 4.0 04/06/2017    CHLORIDE 107 04/06/2017    CO2 23 04/06/2017     (H) 04/06/2017     Lab Results   Component Value Date    INR 2.02 (H) 04/06/2017     Lab Results   Component Value Date    BUN 11 04/06/2017    CR 0.68 04/06/2017     Lab Results   Component Value Date    TROPONIN 0.00 04/02/2017    TROPI 0.110 (H) 04/06/2017       Lab Results   Component Value Date    TROPI 0.110 (H) 04/06/2017    TROPI 0.020 04/02/2017    TROPI  07/02/2016     <0.015  The 99th percentile for upper reference range is 0.045 ug/L.  Troponin values in   the range of 0.045 - 0.120 ug/L may be associated with risks of adverse   clinical events.      TROPI  01/09/2015     <0.015  The 99th percentile for upper reference range is 0.045 ug/L.  Troponin values in   the range of 0.045 - 0.120 ug/L may be associated with risks of adverse   clinical events.   Effective 7/30/2014, the reference range for this assay  has changed to reflect   new instrumentation/methodology.      TROPI <0.012 10/29/2011    TROPONIN 0.00 04/02/2017    TROPONIN 0.00 01/09/2015       EKG 4/6/2017:  Rhythm: Atrial fibrillation - rapid and with RVR  Rate: Tachycardia  Axis: Normal  Ectopy: None  Conduction: Normal  ST Segments/ T Waves: No ST-T wave changes and No acute ischemic changes  Q Waves: None  Comparison to prior: When compared with ECG of 02-APR-2017 21:44, ST no longer depressed in Anterior leads     T wave inversion no longer evident in Inferior leads     T wave inversion no longer evident in Lateral leads    Clinical Impression: atrial fibrillation (chronic) with RVR    Echocardiogram 4/6/2017:   Interpretation Summary  Left ventricular function is normal. The visually estimated EF is 60-65%.  There is basal to mid anteroseptal akinesis.  Right ventricular function, chamber size, wall motion, and thickness are normal.  No significant valvular abnormalities.  The inferior vena cava was normal in size with preserved respiratory variability.  This study was compared with the study from 7/3/2016 . The basal to mid anteroseptal akinesis is new.    Bilateral Lower Extremity ultrasound 4/6/17:  IMPRESSION: No evidence of deep venous thrombosis in either lower extremity.    Stress NM Lexiscan (6/5/2014)  1. Normal myocardial SPECT study with a summed stress score of 2. A summed stress score of less than 4 is associated with an annual event rate of 0.5% and 0.3% for myocardial infarction and cardiac death, respectively (Caro. Circulation 1998;98:535-43).   2. No significant perfusion abnormalities.   3. Hyperdynamic left ventricular systolic function as described above.   4. No prior study available for comparison.    Life Watch (1/21/2013-2/3/2013)  Sinus rhythm with PAC's - symptoms possible correlated with atrial premature beats.    ECHO (10/29/2011)   Global and regional left ventricular function is normal with an EF of 55-60%. The  transmitral Doppler filling pattern is restrictive and c/w increased left atrial pressure (diastolic dysfunction). Global right ventricular function is normal. Severe left atrial enlargement. Right ventricular systolic pressure is mildly elevated at 27 mmHg above the right atrial pressure. The inferior vena cava is dilated at 2.35 cm without respiratory variability, consistent with increased right atrial pressure. No pericardial effusion is present.     Assessment and Recommendation:  Sister Betty Tee is a 76 year old female with a past medication history of a.fib with controlled ventricular respone, LVH, ILD, HFpEF, DM type 2, ANGELICA, DVT who was admitted for AMS who is influenza positive and likely CAP and Cardiology consulted for abnormal finding on Echo and elevated troponin. After reviewing the Echo images, abnormality noted is likely secondary to persistent a.fib and inability to lie still. No acute cardiac concerns at this time. Elevated troponin likely due to demand ischemia 2/2 afib and current acute illness with respiratory distress.     1. A.fib  2. Abnormal Echo results, see above   3. Acute hypoxic respiratory failure  4. Influenza, possible CAP  5. Sjogren's associated ILD and bronchiolitis obliterans  6. AMS  7. HFpEF, Euvolemic  8. HTN  9. DM - uncontrolled      Recommendations:  - no acute cardiac intervention indicated at this time  - may consider repeat Echo at resolution of acute illness  - if acute cardiac clinical changes, please contact Cardiology  - will need to follow up with Dr. Hurtado on discharge  - Cardiology will sign off at this time, but will be available if any acute cardiac changes occur this admission.     I have discussed the above with Dr. Miles.    Thank you for consulting the cardiovascular services at the [facility]. Please do not hesitate to call us with any questions.     Juana Batista MD  [facility] Cardiology Consult Team  Pager  [phone] or [phone]

## 2017-04-06 NOTE — PLAN OF CARE
Problem: Goal Outcome Summary  Goal: Goal Outcome Summary  Outcome: Declining  D: Pneumonia, influenza. Hx of HTN, Afib, Sjogren's Syndrome, ILD, ANGELICA, DM2.  I/A: Hypertensive--MD notified, no new orders. Afib. Afebrile. Lung sounds with expiratory wheezing in all fields. Frequent coughing, very congested. PRN nebs and cough syrup given with some effect. Spoke with on call MD about have nebs scheduled q4 hours throughout night as patient is having marked difficulty breathing. MD ordered DuoNebs and Mucomyst nebs q 4 hours which had some effect. Pt has not slept all night. Around 0530 patient was struggling to breathe with audible wheezing. Respiratory was paged for another PRN neb. Writer consulted charge nurse and together we convinced patient to try the CPAP that was ordered.  CPAP has been helping. Pt is more comfortable and is resting.  P: Continue with CPAP and q 4hr nebs. Obtain order for BiPAP. Continue to monitor.

## 2017-04-06 NOTE — PLAN OF CARE
Problem: Individualization  Goal: Patient Preferences  Outcome: No Change  D AVSS with sat's 97% on oxygen set to 4L/min. Tried to titrate oxygen down but patient refused. Voiced no c/o pain or nausea to this nurse. Ate 100% of supper-see blood glucose readings. Friend/housemate visited providing good support. Up independently in room and too bathroom to void good amounts.   I Vital's, assessment and med's per order.   A Resting in bed with call light in reach.   P Continue to monitor and update MD with changes.

## 2017-04-06 NOTE — PROVIDER NOTIFICATION
04/06/17 0600   Call Information   Date of Call 04/06/17   Time of Call 0652   Name of person requesting the team Opal   Title of person requesting team RN   RRT Arrival time 0655   Time RRT ended 0840   Reason for call   Type of RRT Adult   Primary reason for call Respiratory   Respiratory Rate greater than 24;O2sat less than 88% for greater than 5 minutes despite O2;Labored breathing   Was patient transferred from the ED, ICU, or PACU within last 24 hours prior to RRT call? No   SBAR   Situation Pt had gotten up to the bathroom with 2 liters, became SOB and taking a lot of time to recover, placed back on bipap   Background Has pneumonia and influenza   Notable History/Conditions Cardiac;Diabetes  (ILD, Sjogrens disease)   Assessment Lungs tight, c/o SOB, bp high, resp 30-40s,w/ abd breathing   Interventions CXR;ECG;Meds  (Lactic Acid)   Patient Outcome   Patient Outcome Transferred to  (4a as 6B overflow)   RRT Team   Physician(s) Martell Estrada MD   Lead RN Verena Joseph   RT Deandre   Post RRT Intervention Assessment   Post RRT Assessment Other (see comment)   Date Follow Up Done 04/06/17   Comments on the ICU

## 2017-04-06 NOTE — PLAN OF CARE
Problem: Goal Outcome Summary  Goal: Goal Outcome Summary  PT 6C: Cancel-  Pt not appropriate for therapy this morning 2/2 respiratory distress. Pt transferred units. Will reschedule.

## 2017-04-06 NOTE — PROGRESS NOTES
[facility] - Inpatient daily progress note    Date of admission: 4/2/2017  Date of admission: 4/2/2017  Date of service: 4/6/2017.           Assessment and Plan:   Assessment:   Betty Tee is a 76 year old who was admitted AMS and  for evaluation of persistent cough, dizziness and found to be positive for influenza, went into acute hypoxic respiratory failure 4/6.    Patient Active Problem List   Diagnosis     Temporomandibular joint disorder     Diverticulitis of colon     Disorder of bone and cartilage     Osteoarthritis     Alcohol abuse, in remission     Restless legs syndrome (RLS)     Major depressive disorder, recurrent episode, moderate     Essential hypertension with goal blood pressure less than 140/90     CARDIOVASCULAR SCREENING; LDL GOAL LESS THAN 130     Sciatica     Advanced directives, counseling/discussion     Colouterine fistula     Atrial fibrillation with controlled ventricular response (H)     Left ventricular hypertrophy     Health Care Home     Insomnia     Joint pains     Allergic reaction caused by a drug - likely plaquenil     Breast fibroadenoma     DVT (deep venous thrombosis) (H)     Fatty liver disease, nonalcoholic     Rib pain     Neck mass     Gastroesophageal reflux disease without esophagitis     Enlarged lymph node     Sjogren's syndrome (H)     ANGELICA (obstructive sleep apnea)     ILD (interstitial lung disease) (H)     Lower GI bleed     Acute and chronic respiratory failure with hypoxia (H)     Acute edema of lung (H)     (HFpEF) heart failure with preserved ejection fraction (H)     Type 2 diabetes mellitus without complication, without long-term current use of insulin (H)     CAP (community acquired pneumonia)      Plan:   ## Acute hypoxic respiratory failure:  Patient had an episode of acute hypoxic respiratory failure, triggering a rapid response and requiring bipap early this morning.  Differential for increased oxygen requirements includes muscle  fatigue secondary to coughing throughout the night, ACS, PE, pulmonary edema, or a worsening respiratory infection in the setting of influenza and/or CAP.  Lactic acid was 3.0.  Troponin was slightly elevated, but EKG was normal.  CXR showed findings consistent with possible pulmonary edema/fluid overload.  TTE performed, which showed new septal wall akinesis.  Cardiology consulted for further recommendations/management.    Plan:  - Continuous pulse oximetry  - NPO  - TTE to evaluate cardiac function  - Serial troponins  - Ceftriaxone and Zithromax changed to Vancomycin and Zosyn  - 20 mg Lasix IV for fluid overload  - Cardiology consult  - Blood cultures   - Continue bipap and/or HFNC to keep O2 > 92%  - Solumedrol 125 mg IV Q 6 hours  - Lower extremity ultrasound  - Continue fluticasone daily  - Continue duonebs q 4 hours  - Continue albuterol nebs q 2 hours prn    ## Influenza  ## Possible CAP:  Patient's influenza B test came back positive 4/4, tamiflu started 4/5.  Her symptoms are of viral in etiology; however, due to possible superimposed bacterial infection and high risk factor for bacterial infection (ILD), antibiotics have been administered.  Antibiotics changed from Azithromycin and Ceftriaxone to Vanc and Zosyn for concerns of worsening infection when patient decompensated.     Plan:   - Treat Flu , will start tamiflu   - Continue supplemental oxygen.  - CXR today  - Continue Vanc and Zosyn  - Lactobacillus    ## ILD- Stable  Patient has Sjogren's associated ILD and bronchiolitis obliterans (Follows up w/ Dr. Walsh- Last seen on 3/22/17).  ILD is most likely contributing to patient's worsening respiratory status this morning.    Plan:   - Change prednisone (10 mg dailiy) to solumedrol today  - Cont home montelukast  - Cont home Flovent  - Duoneb   - Albuterol neb prn     ## Dizziness/AMS- resolved  Likely multifactorial : Hypoxia and acute respiratory illness. Negative ACS and stroke work up.      ## A  Fib ,rate controlled   INR goal 2.0-3.0, patient currently at goal    Plan:   - will continue Metoprolol   - Continue warfarin, INR     ## HFpEF, Euvolemic   Patient's home lasix of 40 mg and spironolactone 25 mg daily have been held (in context of infection).  Patient has a history of heart failure, will follow closely.    Plan:  - Lasix 20 mg IV prn  - Daily I/Os  - Daily weights    ## HTN , within acceptable ranges     Plan:   - Cont metoprolol 100 mg daily.    ## Diabetes Mellitus- uncontrolled  Hgb A1c 14.3 (4/2017).  Blood sugars 160s-300s over last 24 hours.    Plan:   - To continue Metformin 1000 mg , may consider maximizing dose   - SSI  - DM education ordered      Hosp regimen:  - Metformin 1000 mg daily  - Lantus 7 units daily (half of prior dose of 14 since patient is NPO)  - Prandial insuline (1 unit per 16 grams of carbs)  - On [facility]     Daily cares -   F:none  E:stable  N:NPO  Lines:PIV  Activity:-Up as tolerated  CODE:Full Code  Prophylaxis:On warfarin, and Lovenox (prophylactic)  PCP communication:  - Ilene Tristan     Dispo: Expected discharge date 2-3 days pending on clinical improvement.             Interval History:   Betty Tee had difficulty sleeping last night due to constant coughing.  This morning, a rapid response was called on patient for increased work of breathing requiring bipap.  A CXR, EKG,  Troponin, BNP, and venous blood gas were obtained.  Patient was transferred to intermediate care.  TTE performed, showed new septal wall akinesis.    Patient complains of SOB, no chest pain, no N/V.  Patient is oriented to month and place, not year.              Review of Systems:   Review of Systems   Constitutional: Negative for chills and fever.   HENT: Negative for congestion.    Respiratory: Positive for cough and shortness of breath.    Cardiovascular: Negative for chest pain.   Gastrointestinal: Negative for abdominal pain, constipation, diarrhea, nausea and vomiting.    Genitourinary: Positive for decreased urine volume.   Neurological: Positive for light-headedness. Negative for dizziness and headaches.   Psychiatric/Behavioral: Positive for confusion. The patient is not nervous/anxious.             Physical Exam (Resident / Clinician):   Vitals were reviewed  Temp: 98.8  F (37.1  C) Temp src: Oral BP: (!) 158/115 Pulse: 87 Heart Rate: 106 Resp: 20 SpO2: 99 % O2 Device: Nasal cannula Oxygen Delivery: Bipap 10/5, 40% FiO2    Physical Exam   Constitutional: She is oriented to person, place, and time. She appears well-developed and well-nourished. She appears distressed.   Moderate distress, has bipap mask in place   HENT:   Head: Normocephalic.   Mouth/Throat: No oropharyngeal exudate.   Eyes: Conjunctivae are normal.   Neck: Normal range of motion. Neck supple.   Cardiovascular: Normal rate.    No murmur heard.  Pulmonary/Chest: She is in respiratory distress. She has wheezes (moderate-severe wheezing diffusely).   Coarse breath sounds with inspiration, mild wheezing diffusely.  Crackles at bases bilaterally.   Abdominal: Soft. Bowel sounds are normal. She exhibits no distension. There is no tenderness.   Musculoskeletal: Normal range of motion. She exhibits no edema or tenderness.   Neurological: She is alert and oriented to person, place, and time.   Skin: Skin is warm and dry.   Psychiatric: She has a normal mood and affect.   Patient is agitated but cooperative, moving around, appears restless/uncomfortable   Vitals reviewed.          Data:   ROUTINE LABS (Last four results)  CMP    Recent Labs  Lab 04/06/17  0723 04/05/17  0726 04/04/17  0858 04/03/17  2019 04/03/17  0723  04/02/17  2147    140 138  --  144  < > 143   POTASSIUM 4.0 4.0 3.8 4.4 3.1*  < > 3.3*   CHLORIDE 107 106 106  --  108  --  108   CO2 23 25 24  --  24  --  23   ANIONGAP 10 8 8  --  11  --  12   * 130* 215*  --  311*  < > 213*   BUN 11 13 12  --  8  --  7   CR 0.68 0.68 0.62  --  0.65  --   0.58   GFRESTIMATED 83 85 >90Non  GFR Calc  --  88  < > >90Non  GFR Calc   GFRESTBLACK >90African American GFR Calc >90African American GFR Calc >90African American GFR Calc  --  >90African American GFR Calc  < > >90African American GFR Calc   CLAUDY 8.3* 8.6 8.6  --  8.5  --  8.2*   MAG 2.1 2.0  --   --  2.4*  --   --    PHOS 3.2 2.4*  --   --   --   --   --    PROTTOTAL  --   --   --   --   --   --  7.0   ALBUMIN  --   --   --   --   --   --  3.0*   BILITOTAL  --   --   --   --   --   --  0.6   ALKPHOS  --   --   --   --   --   --  72   AST  --   --   --   --   --   --  29   ALT  --   --   --   --   --   --  19   < > = values in this interval not displayed.  CBC    Recent Labs  Lab 04/06/17 0723 04/05/17 0726 04/04/17 0650 04/02/17  2154 04/02/17  2147   WBC 5.8 6.3 4.2  --  6.4   RBC 4.48 4.09 4.02  --  4.66   HGB 9.5* 8.4* 8.3* 11.6* 9.9*   HCT 33.4* 30.4* 30.5*  --  34.6*   MCV 75* 74* 76*  --  74*   MCH 21.2* 20.5* 20.6*  --  21.2*   MCHC 28.4* 27.6* 27.2*  --  28.6*   RDW 19.0* 18.7* 18.8*  --  18.6*    191 188  --  218     INR    Recent Labs  Lab 04/06/17 0723 04/05/17 0726 04/04/17  0650 04/03/17  0904   INR 2.02* 1.67* 1.53* 1.72*     CRP    Recent Labs  Lab 04/06/17 0723 04/04/17  0858   CRP 64.0* 54.0*         Recent Labs  Lab 04/03/17  1045 04/03/17  0740 04/02/17  2327 04/02/17  2211 04/02/17  2147   CULT Light growth Normal juan >10 Squamous epithelial cells/low power field indicates oral contamination. Please recollect.Canceled, Test credited* 10,000 to 50,000 colonies/mL mixed urogenital juan No growth after 4 days No growth after 4 days       4/6/2017 CXR:  IMPRESSION:   1. Increasingly prominent pulmonary vascular congestion and mixed  perihilar and bibasilar opacities, suggestive of worsening pulmonary  edema.  2. Enlarging pleural effusions.    4/6/2017 TTE:  Interpretation Summary  Left ventricular function is normal. The visually estimated EF is  60-65%.  There is basal to mid anteroseptal akinesis.  Right ventricular function, chamber size, wall motion, and thickness are  normal.  No significant valvular abnormalities.  The inferior vena cava was normal in size with preserved respiratory  variability.  This study was compared with the study from 7/3/2016 . The basal to mid  anteroseptal akinesis is new.          Medications:     Current Facility-Administered Medications   Medication     ipratropium - albuterol 0.5 mg/2.5 mg/3 mL (DUONEB) neb solution 3 mL     acetylcysteine (MUCOMYST) 20 % nebulizer solution 2 mL     oseltamivir (TAMIFLU) capsule 75 mg     insulin aspart (NovoLOG) inj (RAPID ACTING)     insulin aspart (NovoLOG) inj (RAPID ACTING)     insulin aspart (NovoLOG) inj (RAPID ACTING)     insulin aspart (NovoLOG) inj (RAPID ACTING)     traZODone (DESYREL) half-tab 25-50 mg     furosemide (LASIX) half-tab 10 mg     spironolactone (ALDACTONE) tablet 25 mg     acetaminophen (TYLENOL) tablet 1,000 mg    Or     acetaminophen (TYLENOL) Suppository 650 mg     metFORMIN (GLUCOPHAGE-XR) 24 hr tablet 1,000 mg     insulin glargine (LANTUS) injection 14 Units     lactobacillus rhamnosus (GG) (CULTURELL) capsule 1 capsule     fluticasone (FLONASE) 50 MCG/ACT spray 1-2 spray     fluticasone furoate (ARNUITY ELLIPTA) 200 MCG/ACT inhalation powder 1 puff     ketoconazole (NIZORAL) 2 % cream     pantoprazole (PROTONIX) EC tablet 20-40 mg     polyethylene glycol (MIRALAX/GLYCOLAX) Packet 17 g     predniSONE (DELTASONE) tablet 30 mg     montelukast (SINGULAIR) tablet 10 mg     PARoxetine (PAXIL) tablet 10 mg     Warfarin Therapy Reminder (Check START DATE - warfarin may be starting in the FUTURE)     naloxone (NARCAN) injection 0.1-0.4 mg     lidocaine 1 % 1 mL     lidocaine (LMX4) kit     sodium chloride (PF) 0.9% PF flush 3 mL     sodium chloride (PF) 0.9% PF flush 3 mL     Patient is already receiving anticoagulation with heparin, enoxaparin (LOVENOX), warfarin  (COUMADIN)  or other anticoagulant medication     cefTRIAXone (ROCEPHIN) 2 g vial to attach to  ml bag for ADULTS or NS 50 ml bag for PEDS     azithromycin (ZITHROMAX) tablet 250 mg     albuterol neb solution 2.5 mg     guaiFENesin-dextromethorphan (ROBITUSSIN DM) 100-10 MG/5ML syrup 10 mL     potassium chloride SA (K-DUR/KLOR-CON M) CR tablet 20-40 mEq     potassium chloride (KLOR-CON) Packet 20-40 mEq     potassium chloride 10 mEq in 100 mL intermittent infusion     potassium chloride 10 mEq in 100 mL intermittent infusion with 10 mg lidocaine     potassium chloride 20 mEq in 50 mL intermittent infusion     metoprolol (TOPROL-XL) 24 hr tablet 100 mg     enoxaparin (LOVENOX) injection 40 mg     glucose 40 % gel 15-30 g    Or     dextrose 50 % injection 25-50 mL    Or     glucagon injection 1 mg     insulin aspart (NovoLOG) inj (RAPID ACTING)     insulin aspart (NovoLOG) inj (RAPID ACTING)       Caring Physician: Beatriz Painting MD  [facility]  Pager Contact: see Physician sticky note

## 2017-04-07 LAB
ANION GAP SERPL CALCULATED.3IONS-SCNC: 10 MMOL/L (ref 3–14)
BASOPHILS # BLD AUTO: 0 10E9/L (ref 0–0.2)
BASOPHILS NFR BLD AUTO: 0.5 %
BUN SERPL-MCNC: 14 MG/DL (ref 7–30)
CALCIUM SERPL-MCNC: 7.8 MG/DL (ref 8.5–10.1)
CHLORIDE SERPL-SCNC: 105 MMOL/L (ref 94–109)
CO2 SERPL-SCNC: 24 MMOL/L (ref 20–32)
CREAT SERPL-MCNC: 0.61 MG/DL (ref 0.52–1.04)
DIFFERENTIAL METHOD BLD: ABNORMAL
EOSINOPHIL # BLD AUTO: 0 10E9/L (ref 0–0.7)
EOSINOPHIL NFR BLD AUTO: 0 %
ERYTHROCYTE [DISTWIDTH] IN BLOOD BY AUTOMATED COUNT: 19.1 % (ref 10–15)
GFR SERPL CREATININE-BSD FRML MDRD: ABNORMAL ML/MIN/1.7M2
GLUCOSE BLDC GLUCOMTR-MCNC: 242 MG/DL (ref 70–99)
GLUCOSE BLDC GLUCOMTR-MCNC: 281 MG/DL (ref 70–99)
GLUCOSE BLDC GLUCOMTR-MCNC: 305 MG/DL (ref 70–99)
GLUCOSE BLDC GLUCOMTR-MCNC: 320 MG/DL (ref 70–99)
GLUCOSE BLDC GLUCOMTR-MCNC: 335 MG/DL (ref 70–99)
GLUCOSE BLDC GLUCOMTR-MCNC: 360 MG/DL (ref 70–99)
GLUCOSE BLDC GLUCOMTR-MCNC: 419 MG/DL (ref 70–99)
GLUCOSE BLDC GLUCOMTR-MCNC: 513 MG/DL (ref 70–99)
GLUCOSE SERPL-MCNC: 229 MG/DL (ref 70–99)
HCT VFR BLD AUTO: 31.6 % (ref 35–47)
HGB BLD-MCNC: 8.8 G/DL (ref 11.7–15.7)
IMM GRANULOCYTES # BLD: 0 10E9/L (ref 0–0.4)
IMM GRANULOCYTES NFR BLD: 0 %
INR PPP: 2.03 (ref 0.86–1.14)
INTERPRETATION ECG - MUSE: NORMAL
LYMPHOCYTES # BLD AUTO: 0.6 10E9/L (ref 0.8–5.3)
LYMPHOCYTES NFR BLD AUTO: 14.2 %
MCH RBC QN AUTO: 21 PG (ref 26.5–33)
MCHC RBC AUTO-ENTMCNC: 27.8 G/DL (ref 31.5–36.5)
MCV RBC AUTO: 75 FL (ref 78–100)
MONOCYTES # BLD AUTO: 0.1 10E9/L (ref 0–1.3)
MONOCYTES NFR BLD AUTO: 1.1 %
NEUTROPHILS # BLD AUTO: 3.7 10E9/L (ref 1.6–8.3)
NEUTROPHILS NFR BLD AUTO: 84.2 %
NRBC # BLD AUTO: 0 10*3/UL
NRBC BLD AUTO-RTO: 0 /100
PLATELET # BLD AUTO: 193 10E9/L (ref 150–450)
POTASSIUM SERPL-SCNC: 4.1 MMOL/L (ref 3.4–5.3)
RBC # BLD AUTO: 4.19 10E12/L (ref 3.8–5.2)
SODIUM SERPL-SCNC: 140 MMOL/L (ref 133–144)
WBC # BLD AUTO: 4.4 10E9/L (ref 4–11)

## 2017-04-07 PROCEDURE — 85610 PROTHROMBIN TIME: CPT | Performed by: HOSPITALIST

## 2017-04-07 PROCEDURE — 25000128 H RX IP 250 OP 636: Performed by: FAMILY MEDICINE

## 2017-04-07 PROCEDURE — 12000008 ZZH R&B INTERMEDIATE UMMC

## 2017-04-07 PROCEDURE — A9270 NON-COVERED ITEM OR SERVICE: HCPCS | Mod: GY | Performed by: FAMILY MEDICINE

## 2017-04-07 PROCEDURE — 94640 AIRWAY INHALATION TREATMENT: CPT

## 2017-04-07 PROCEDURE — 94640 AIRWAY INHALATION TREATMENT: CPT | Mod: 76

## 2017-04-07 PROCEDURE — 25000125 ZZHC RX 250

## 2017-04-07 PROCEDURE — 99222 1ST HOSP IP/OBS MODERATE 55: CPT | Mod: 25 | Performed by: INTERNAL MEDICINE

## 2017-04-07 PROCEDURE — 36415 COLL VENOUS BLD VENIPUNCTURE: CPT | Performed by: HOSPITALIST

## 2017-04-07 PROCEDURE — 40000275 ZZH STATISTIC RCP TIME EA 10 MIN

## 2017-04-07 PROCEDURE — 80048 BASIC METABOLIC PNL TOTAL CA: CPT | Performed by: HOSPITALIST

## 2017-04-07 PROCEDURE — 25000128 H RX IP 250 OP 636

## 2017-04-07 PROCEDURE — 85025 COMPLETE CBC W/AUTO DIFF WBC: CPT | Performed by: HOSPITALIST

## 2017-04-07 PROCEDURE — 40000894 ZZH STATISTIC OT IP EVAL DEFER: Performed by: OCCUPATIONAL THERAPIST

## 2017-04-07 PROCEDURE — 00000146 ZZHCL STATISTIC GLUCOSE BY METER IP

## 2017-04-07 PROCEDURE — A9270 NON-COVERED ITEM OR SERVICE: HCPCS | Mod: GY | Performed by: STUDENT IN AN ORGANIZED HEALTH CARE EDUCATION/TRAINING PROGRAM

## 2017-04-07 PROCEDURE — 25000132 ZZH RX MED GY IP 250 OP 250 PS 637: Mod: GY | Performed by: FAMILY MEDICINE

## 2017-04-07 PROCEDURE — 25000131 ZZH RX MED GY IP 250 OP 636 PS 637: Mod: GY | Performed by: FAMILY MEDICINE

## 2017-04-07 PROCEDURE — 25000125 ZZHC RX 250: Performed by: FAMILY MEDICINE

## 2017-04-07 PROCEDURE — 94660 CPAP INITIATION&MGMT: CPT

## 2017-04-07 PROCEDURE — 25000132 ZZH RX MED GY IP 250 OP 250 PS 637: Mod: GY | Performed by: STUDENT IN AN ORGANIZED HEALTH CARE EDUCATION/TRAINING PROGRAM

## 2017-04-07 PROCEDURE — A9270 NON-COVERED ITEM OR SERVICE: HCPCS | Mod: GY | Performed by: HOSPITALIST

## 2017-04-07 PROCEDURE — 25000132 ZZH RX MED GY IP 250 OP 250 PS 637: Mod: GY | Performed by: HOSPITALIST

## 2017-04-07 RX ORDER — PANTOPRAZOLE SODIUM 40 MG/1
40 TABLET, DELAYED RELEASE ORAL EVERY MORNING
Status: DISCONTINUED | OUTPATIENT
Start: 2017-04-07 | End: 2017-04-11 | Stop reason: HOSPADM

## 2017-04-07 RX ORDER — DEXTROSE MONOHYDRATE 25 G/50ML
25-50 INJECTION, SOLUTION INTRAVENOUS
Status: DISCONTINUED | OUTPATIENT
Start: 2017-04-07 | End: 2017-04-11 | Stop reason: HOSPADM

## 2017-04-07 RX ORDER — NICOTINE POLACRILEX 4 MG
15-30 LOZENGE BUCCAL
Status: DISCONTINUED | OUTPATIENT
Start: 2017-04-07 | End: 2017-04-11 | Stop reason: HOSPADM

## 2017-04-07 RX ORDER — FUROSEMIDE 10 MG/ML
20 INJECTION INTRAMUSCULAR; INTRAVENOUS ONCE
Status: COMPLETED | OUTPATIENT
Start: 2017-04-07 | End: 2017-04-07

## 2017-04-07 RX ORDER — PREDNISONE 20 MG/1
40 TABLET ORAL DAILY
Status: DISCONTINUED | OUTPATIENT
Start: 2017-04-07 | End: 2017-04-09

## 2017-04-07 RX ORDER — WARFARIN SODIUM 4 MG/1
8 TABLET ORAL
Status: COMPLETED | OUTPATIENT
Start: 2017-04-07 | End: 2017-04-07

## 2017-04-07 RX ORDER — FUROSEMIDE 40 MG
40 TABLET ORAL
Status: DISCONTINUED | OUTPATIENT
Start: 2017-04-08 | End: 2017-04-11 | Stop reason: HOSPADM

## 2017-04-07 RX ADMIN — INSULIN GLARGINE 14 UNITS: 100 INJECTION, SOLUTION SUBCUTANEOUS at 12:30

## 2017-04-07 RX ADMIN — WARFARIN SODIUM 8 MG: 4 TABLET ORAL at 18:10

## 2017-04-07 RX ADMIN — OSELTAMIVIR PHOSPHATE 75 MG: 75 CAPSULE ORAL at 20:06

## 2017-04-07 RX ADMIN — PAROXETINE HYDROCHLORIDE 10 MG: 10 TABLET, FILM COATED ORAL at 20:06

## 2017-04-07 RX ADMIN — IPRATROPIUM BROMIDE AND ALBUTEROL SULFATE 3 ML: .5; 3 SOLUTION RESPIRATORY (INHALATION) at 04:38

## 2017-04-07 RX ADMIN — METOPROLOL SUCCINATE 100 MG: 100 TABLET, FILM COATED, EXTENDED RELEASE ORAL at 08:55

## 2017-04-07 RX ADMIN — PANTOPRAZOLE SODIUM 40 MG: 20 TABLET, DELAYED RELEASE ORAL at 08:56

## 2017-04-07 RX ADMIN — FUROSEMIDE 20 MG: 10 INJECTION, SOLUTION INTRAVENOUS at 10:28

## 2017-04-07 RX ADMIN — METHYLPREDNISOLONE SODIUM SUCCINATE 125 MG: 125 INJECTION, POWDER, LYOPHILIZED, FOR SOLUTION INTRAMUSCULAR; INTRAVENOUS at 04:42

## 2017-04-07 RX ADMIN — PIPERACILLIN AND TAZOBACTAM 3.38 G: 3; .375 INJECTION, POWDER, LYOPHILIZED, FOR SOLUTION INTRAVENOUS; PARENTERAL at 02:11

## 2017-04-07 RX ADMIN — Medication 1 CAPSULE: at 18:08

## 2017-04-07 RX ADMIN — ACETAMINOPHEN 1000 MG: 325 TABLET, FILM COATED ORAL at 02:11

## 2017-04-07 RX ADMIN — OSELTAMIVIR PHOSPHATE 75 MG: 75 CAPSULE ORAL at 09:20

## 2017-04-07 RX ADMIN — KETOCONAZOLE: 20 CREAM TOPICAL at 09:21

## 2017-04-07 RX ADMIN — PREDNISONE 40 MG: 20 TABLET ORAL at 15:19

## 2017-04-07 RX ADMIN — IPRATROPIUM BROMIDE AND ALBUTEROL SULFATE 3 ML: .5; 3 SOLUTION RESPIRATORY (INHALATION) at 20:35

## 2017-04-07 RX ADMIN — PIPERACILLIN AND TAZOBACTAM 3.38 G: 3; .375 INJECTION, POWDER, LYOPHILIZED, FOR SOLUTION INTRAVENOUS; PARENTERAL at 08:52

## 2017-04-07 RX ADMIN — GUAIFENESIN AND DEXTROMETHORPHAN 10 ML: 100; 10 SYRUP ORAL at 23:05

## 2017-04-07 RX ADMIN — Medication 1 CAPSULE: at 15:19

## 2017-04-07 RX ADMIN — PIPERACILLIN AND TAZOBACTAM 3.38 G: 3; .375 INJECTION, POWDER, LYOPHILIZED, FOR SOLUTION INTRAVENOUS; PARENTERAL at 20:59

## 2017-04-07 RX ADMIN — IPRATROPIUM BROMIDE AND ALBUTEROL SULFATE 3 ML: .5; 3 SOLUTION RESPIRATORY (INHALATION) at 00:32

## 2017-04-07 RX ADMIN — FLUTICASONE FUROATE 1 PUFF: 200 POWDER RESPIRATORY (INHALATION) at 09:20

## 2017-04-07 RX ADMIN — Medication 1 CAPSULE: at 08:56

## 2017-04-07 RX ADMIN — GUAIFENESIN AND DEXTROMETHORPHAN 10 ML: 100; 10 SYRUP ORAL at 09:00

## 2017-04-07 RX ADMIN — SPIRONOLACTONE 25 MG: 25 TABLET ORAL at 08:54

## 2017-04-07 RX ADMIN — IPRATROPIUM BROMIDE AND ALBUTEROL SULFATE 3 ML: .5; 3 SOLUTION RESPIRATORY (INHALATION) at 13:23

## 2017-04-07 RX ADMIN — IPRATROPIUM BROMIDE AND ALBUTEROL SULFATE 3 ML: .5; 3 SOLUTION RESPIRATORY (INHALATION) at 17:08

## 2017-04-07 RX ADMIN — PIPERACILLIN AND TAZOBACTAM 3.38 G: 3; .375 INJECTION, POWDER, LYOPHILIZED, FOR SOLUTION INTRAVENOUS; PARENTERAL at 15:19

## 2017-04-07 RX ADMIN — HUMAN INSULIN 5 UNITS/HR: 100 INJECTION, SOLUTION SUBCUTANEOUS at 19:12

## 2017-04-07 RX ADMIN — IPRATROPIUM BROMIDE AND ALBUTEROL SULFATE 3 ML: .5; 3 SOLUTION RESPIRATORY (INHALATION) at 08:44

## 2017-04-07 RX ADMIN — MONTELUKAST SODIUM 10 MG: 10 TABLET, FILM COATED ORAL at 20:06

## 2017-04-07 ASSESSMENT — ENCOUNTER SYMPTOMS
DIZZINESS: 0
NERVOUS/ANXIOUS: 0
COUGH: 1
FEVER: 0
VOMITING: 0
HEADACHES: 0
SHORTNESS OF BREATH: 0
ABDOMINAL PAIN: 0
NAUSEA: 0
CONSTIPATION: 0
DIARRHEA: 0
CONFUSION: 0
LIGHT-HEADEDNESS: 0
CHILLS: 0

## 2017-04-07 NOTE — SIGNIFICANT EVENT
SPIRITUAL HEALTH SERVICES Significant Event  Jew Sacrament of ANOINTING  [facility] ([place]) 4A    Pt anointed and received the Holy Communion by Father Ricardo Bentley, [facility] priest low, per request of patient.   Pt couldn't remember that her colleagues sisters and her friend were visiting with her yesterday.   Today she is fully alert and able to engage in conversation.  Pt asked me to let Fr. Siddharth HINOJOSA, my colleague priest low, know that she is/was in this hospital.   Spiritual support and encouragement provided.      Father Ricardo Low

## 2017-04-07 NOTE — PROGRESS NOTES
[facility] daily [facility]    Date of admission: 4/2/2017  Date of admission: 4/2/2017  Date of service: 4/7/2017.           Assessment and Plan:   Assessment:   Betty Tee is a 76 year old who was admitted AMS and  for evaluation of persistent cough, dizziness and found to be positive for influenza, went into acute hypoxic respiratory failure 4/6.    Patient Active Problem List   Diagnosis     Temporomandibular joint disorder     Diverticulitis of colon     Disorder of bone and cartilage     Osteoarthritis     Alcohol abuse, in remission     Restless legs syndrome (RLS)     Major depressive disorder, recurrent episode, moderate     Essential hypertension with goal blood pressure less than 140/90     CARDIOVASCULAR SCREENING; LDL GOAL LESS THAN 130     Sciatica     Advanced directives, counseling/discussion     Colouterine fistula     Atrial fibrillation with controlled ventricular response (H)     Left ventricular hypertrophy     Health Care Home     Insomnia     Joint pains     Allergic reaction caused by a drug - likely plaquenil     Breast fibroadenoma     DVT (deep venous thrombosis) (H)     Fatty liver disease, nonalcoholic     Rib pain     Neck mass     Gastroesophageal reflux disease without esophagitis     Enlarged lymph node     Sjogren's syndrome (H)     ANGELICA (obstructive sleep apnea)     ILD (interstitial lung disease) (H)     Lower GI bleed     Acute and chronic respiratory failure with hypoxia (H)     Acute edema of lung (H)     (HFpEF) heart failure with preserved ejection fraction (H)     Type 2 diabetes mellitus without complication, without long-term current use of insulin (H)     CAP (community acquired pneumonia)      Plan:   ## Acute hypoxic respiratory failure:  Improving  Patient had an episode of acute hypoxic respiratory failure, triggering a rapid response and requiring bipap and transfer to intermediate care unit on 4/6.  Breathing and mental  status are improved today and patient's oxygen requirement have decreased significantly.  Patient responded well to IV lasix yesterday (3.5 liters of urine produced with marked clinical improvement), so etiology of acute respiratory failure was most likely fluid overload secondary to pulmonary edema/acute diastolic heart failure brought on by influenza and patient's underlying ILD.  CXR showed pulmonary edema and bilateral pleural effusions.   A cardiac etiology was also worked up, but septal wall akinesis was thought to be secondary to patient's underlying atrial fibrillation.  Lactic acid and troponins normalized.  Blood cultures have been NGTD.  Lower extremity ultrasounds were negative for clots.    Plan:  - Continuous pulse oximetry  - Advance to full liquid diet  - Continue Zosyn  - DC Vancomycin  - 20 mg Lasix IV today  - Follow up blood cultures   - Continue bipap and/or HFNC to keep O2 > 92%  - DC Solumedrol 125 mg IV Q 6 hours  - Restart prednisone (40 mg today followed by taper starting tomorrow)  - Continue fluticasone daily  - Continue duonebs q 4 hours  - Continue albuterol nebs q 2 hours prn    ## Influenza:  Improving  ## Possible CAP:  Improving  Patient's influenza B test came back positive 4/4, tamiflu started 4/5.  Her symptoms are of viral in etiology; however, due to possible superimposed bacterial infection and high risk factor for bacterial infection (ILD), antibiotics have been administered.  Antibiotics changed from Azithromycin and Ceftriaxone to Vanc and Zosyn 4/6 for concerns of worsening infection when patient decompensated.     Plan:   - Continue Tamiflu (started 4/5)  - Continue supplemental oxygen as needed  - Continue Zosyn  - Plan to check procalcitonin tomorrow  - Plan to DC Zosyn and transition to oral antibiotics 4/8  - Discontinue Vancomycin  - Lactobacillus    ## ILD- Stable  Patient has Sjogren's associated ILD and bronchiolitis obliterans (Follows up w/ Dr. Walsh- Last seen  on 3/22/17).  ILD is most likely contributing to patient's worsening respiratory status yesterday.  Since patient's hospitalization, she has received 30 mg prednisone (vs 10 mg home dose) for pulmonary issues and then patient received solumedrol yesterday.  Will give 40 mg prednisone today, then plan for taper starting tomorrow    Plan:   - Prednisone 40 mg today, followed by taper to home dose of 10 mg  - Cont home montelukast  - Cont home Flovent  - Duoneb   - Albuterol neb prn     ## Dizziness/AMS- resolved  Likely multifactorial : Hypoxia and acute respiratory illness. Negative ACS and stroke work up.      ## A Fib ,rate controlled   INR goal 2.0-3.0, patient currently at goal    Plan:   - will continue Metoprolol   - Continue warfarin, INR     ## HFpEF, Euvolemic   Patient's home lasix of 40 mg and spironolactone 25 mg daily have been held (in context of infection).  Patient has a history of heart failure, will follow closely.    Plan:  - Lasix 20 mg IV prn today  - Resume home lasix of 40 mg po bid  - Daily I/Os  - Daily weights    ## HTN , within acceptable ranges     Plan:   - Cont metoprolol 100 mg daily.  - Continue spironolactone 25 mg daily    ## Diabetes Mellitus- uncontrolled  Hgb A1c 14.3 (4/2017).  Patient is a recently diagnosed diabetic.  Blood sugars 180s-290s over last 24 hours.  Will plan to simplify medical regimen prior to patient's discharge.    Plan:   - To continue Metformin 1000 mg , may consider maximizing dose   - DM education ordered      Hosp regimen:  - Metformin 1000 mg daily  - Lantus 14 units daily   - Prandial insulin (1 unit per 16 grams of carbs)  - On [facility] [place]     Daily cares -   F:none  E:stable  N:regular diet  Lines:PIV  Activity:-Up as tolerated  CODE:Full Code  Prophylaxis:On warfarin  PCP communication:  - Ilene Tristan     Dispo: Expected discharge date 2-3 days pending on clinical improvement.             Interval History:   Betty Tee received 3 doses of IV  lasix (20mg) yesterday.  She used bipap overnight and states she is feeling much better today.  Breathing is better, patient feels less anxious.              Review of Systems:   Review of Systems   Constitutional: Negative for chills and fever.   HENT: Negative for congestion.    Respiratory: Positive for cough. Negative for shortness of breath.    Cardiovascular: Negative for chest pain.   Gastrointestinal: Negative for abdominal pain, constipation, diarrhea, nausea and vomiting.   Neurological: Negative for dizziness, light-headedness and headaches.   Psychiatric/Behavioral: Negative for confusion. The patient is not nervous/anxious.             Physical Exam (Resident / Clinician):   Vitals were reviewed  Temp: 97.6  F (36.4  C) Temp src: Axillary BP: 134/81   Heart Rate: 79 Resp: 20 SpO2: 98 % O2 Device: BiPAP/CPAP Oxygen Delivery: Bipap 10/5, 40% FiO2    Physical Exam   Constitutional: She is oriented to person, place, and time. She appears well-developed and well-nourished. No distress.   Sitting in chair, brushing teeth, NAD   HENT:   Head: Normocephalic.   Mouth/Throat: No oropharyngeal exudate.   Eyes: Conjunctivae are normal.   Neck: Normal range of motion. Neck supple.   Cardiovascular: Normal rate.    No murmur heard.  Pulmonary/Chest: No respiratory distress. She has no wheezes.   Crackles at bases bilaterally.   Abdominal: Soft. Bowel sounds are normal. She exhibits no distension. There is no tenderness.   Musculoskeletal: Normal range of motion. She exhibits no edema or tenderness.   Neurological: She is alert and oriented to person, place, and time.   Skin: Skin is warm and dry.   Psychiatric: She has a normal mood and affect. Her behavior is normal. Judgment and thought content normal.   Vitals reviewed.          Data:   ROUTINE LABS (Last four results)  CMP    Recent Labs  Lab 04/07/17  0313 04/06/17  1944 04/06/17  0723 04/05/17  0726  04/03/17  0723  04/02/17  2147    140 140 140  < > 144   < > 143   POTASSIUM 4.1 3.9 4.0 4.0  < > 3.1*  < > 3.3*   CHLORIDE 105 107 107 106  < > 108  --  108   CO2 24 25 23 25  < > 24  --  23   ANIONGAP 10 8 10 8  < > 11  --  12   * 291* 213* 130*  < > 311*  < > 213*   BUN 14 12 11 13  < > 8  --  7   CR 0.61 0.61 0.68 0.68  < > 0.65  --  0.58   GFRESTIMATED >90Non  GFR Calc >90Non  GFR Calc 83 85  < > 88  < > >90Non  GFR Calc   GFRESTBLACK >90African American GFR Calc >90African American GFR Calc >90African American GFR Calc >90African American GFR Calc  < > >90African American GFR Calc  < > >90African American GFR Calc   CLAUDY 7.8* 7.8* 8.3* 8.6  < > 8.5  --  8.2*   MAG  --  2.0 2.1 2.0  --  2.4*  --   --    PHOS  --  2.2* 3.2 2.4*  --   --   --   --    PROTTOTAL  --   --   --   --   --   --   --  7.0   ALBUMIN  --   --   --   --   --   --   --  3.0*   BILITOTAL  --   --   --   --   --   --   --  0.6   ALKPHOS  --   --   --   --   --   --   --  72   AST  --   --   --   --   --   --   --  29   ALT  --   --   --   --   --   --   --  19   < > = values in this interval not displayed.  CBC    Recent Labs  Lab 04/07/17  0313 04/06/17  0723 04/05/17  0726 04/04/17  0650   WBC 4.4 5.8 6.3 4.2   RBC 4.19 4.48 4.09 4.02   HGB 8.8* 9.5* 8.4* 8.3*   HCT 31.6* 33.4* 30.4* 30.5*   MCV 75* 75* 74* 76*   MCH 21.0* 21.2* 20.5* 20.6*   MCHC 27.8* 28.4* 27.6* 27.2*   RDW 19.1* 19.0* 18.7* 18.8*    216 191 188     INR    Recent Labs  Lab 04/07/17  0313 04/06/17  0723 04/05/17  0726 04/04/17  0650   INR 2.03* 2.02* 1.67* 1.53*     CRP    Recent Labs  Lab 04/06/17  0723 04/04/17  0858   CRP 64.0* 54.0*         Recent Labs  Lab 04/06/17  1055 04/06/17  1028 04/03/17  1045 04/03/17  0740 04/02/17  2327 04/02/17  2211 04/02/17  2147   CULT No growth after 3 hours No growth after 3 hours Light growth Normal juan >10 Squamous epithelial cells/low power field indicates oral contamination. Please recollect.Canceled, Test credited* 10,000  to 50,000 colonies/mL mixed urogenital juan No growth after 4 days No growth after 4 days       4/6/2017 CXR:  IMPRESSION:   1. Increasingly prominent pulmonary vascular congestion and mixed  perihilar and bibasilar opacities, suggestive of worsening pulmonary  edema.  2. Enlarging pleural effusions.    4/6/2017 TTE:  Interpretation Summary  Left ventricular function is normal. The visually estimated EF is 60-65%.  There is basal to mid anteroseptal akinesis.  Right ventricular function, chamber size, wall motion, and thickness are  normal.  No significant valvular abnormalities.  The inferior vena cava was normal in size with preserved respiratory  variability.  This study was compared with the study from 7/3/2016 . The basal to mid  anteroseptal akinesis is new.          Medications:     Current Facility-Administered Medications   Medication     piperacillin-tazobactam (ZOSYN) 3.375 g vial to attach to  mL bag     vancomycin (VANCOCIN) 2,000 mg in NaCl 0.9 % 500 mL intermittent infusion     pantoprazole (PROTONIX) 40 mg IV push injection     insulin glargine (LANTUS) injection 7 Units     methylPREDNISolone sodium succinate (solu-MEDROL) injection 125 mg     ipratropium - albuterol 0.5 mg/2.5 mg/3 mL (DUONEB) neb solution 3 mL     insulin aspart (NovoLOG) inj (RAPID ACTING)     insulin aspart (NovoLOG) inj (RAPID ACTING)     oseltamivir (TAMIFLU) capsule 75 mg     insulin aspart (NovoLOG) inj (RAPID ACTING)     insulin aspart (NovoLOG) inj (RAPID ACTING)     insulin aspart (NovoLOG) inj (RAPID ACTING)     insulin aspart (NovoLOG) inj (RAPID ACTING)     traZODone (DESYREL) half-tab 25-50 mg     furosemide (LASIX) half-tab 10 mg     spironolactone (ALDACTONE) tablet 25 mg     acetaminophen (TYLENOL) tablet 1,000 mg    Or     acetaminophen (TYLENOL) Suppository 650 mg     lactobacillus rhamnosus (GG) (CULTURELL) capsule 1 capsule     fluticasone (FLONASE) 50 MCG/ACT spray 1-2 spray     fluticasone furoate  (ARNUITY ELLIPTA) 200 MCG/ACT inhalation powder 1 puff     ketoconazole (NIZORAL) 2 % cream     polyethylene glycol (MIRALAX/GLYCOLAX) Packet 17 g     montelukast (SINGULAIR) tablet 10 mg     PARoxetine (PAXIL) tablet 10 mg     Warfarin Therapy Reminder (Check START DATE - warfarin may be starting in the FUTURE)     naloxone (NARCAN) injection 0.1-0.4 mg     lidocaine 1 % 1 mL     lidocaine (LMX4) kit     sodium chloride (PF) 0.9% PF flush 3 mL     sodium chloride (PF) 0.9% PF flush 3 mL     Patient is already receiving anticoagulation with heparin, enoxaparin (LOVENOX), warfarin (COUMADIN)  or other anticoagulant medication     albuterol neb solution 2.5 mg     guaiFENesin-dextromethorphan (ROBITUSSIN DM) 100-10 MG/5ML syrup 10 mL     potassium chloride SA (K-DUR/KLOR-CON M) CR tablet 20-40 mEq     potassium chloride (KLOR-CON) Packet 20-40 mEq     potassium chloride 10 mEq in 100 mL intermittent infusion     potassium chloride 10 mEq in 100 mL intermittent infusion with 10 mg lidocaine     potassium chloride 20 mEq in 50 mL intermittent infusion     metoprolol (TOPROL-XL) 24 hr tablet 100 mg     enoxaparin (LOVENOX) injection 40 mg     glucose 40 % gel 15-30 g    Or     dextrose 50 % injection 25-50 mL    Or     glucagon injection 1 mg       Caring Physician: Beatriz Painting MD  [facility]  Pager Contact: see Physician sticky note

## 2017-04-07 NOTE — PLAN OF CARE
Problem: Goal Outcome Summary  Goal: Goal Outcome Summary  PT evaluation deferred until 4/8.  After consultation with patient and OT, patient may benefit from PT intervention to address safety measures due to falls risk associated with orthostatic hypotension and intermittent dizziness.  Will plan to evaluate on 4/8.

## 2017-04-07 NOTE — PLAN OF CARE
Problem: Pneumonia (Adult)  Goal: Signs and Symptoms of Listed Potential Problems Will be Absent or Manageable (Pneumonia)  Signs and symptoms of listed potential problems will be absent or manageable by discharge/transition of care (reference Pneumonia (Adult) CPG).   Outcome: Declining  D. Transfer to ICU via bed - on bipap 40% fio2- sats 97% very confused pulling on lines- vss-   A lung sounds worse after IV vanco infusion 500cc fluid-  I dr notified -order for Lasix  P after lasix cond. Improved.

## 2017-04-07 NOTE — PLAN OF CARE
Problem: Goal Outcome Summary  Goal: Goal Outcome Summary  Outcome: No Change  Neuro: Pt AOX4 and pleasant. PERRLA intact and ambulates independently to the bedside commode. Tingling present in the lower extremities MD aware.      CV: Atrial Fib rate controlled 70-80s. BP stable.     Resp: On BiPAP FiO2 40% sating 98%. Sounds coarse/clear with diminished bases.     GI: Bowel sounds present. Distended belly and one small BM. On a clear liquid diet.     : Voids spontaneously using the bedside commode. Adequate urine production.     Access: Bilateral PIVs. TKO running at 10ml/hr.     Plan: Transfer to  when a bed is available and continuing to monitor.

## 2017-04-08 ENCOUNTER — APPOINTMENT (OUTPATIENT)
Dept: PHYSICAL THERAPY | Facility: CLINIC | Age: 77
DRG: 193 | End: 2017-04-08
Payer: MEDICARE

## 2017-04-08 ENCOUNTER — APPOINTMENT (OUTPATIENT)
Dept: GENERAL RADIOLOGY | Facility: CLINIC | Age: 77
DRG: 193 | End: 2017-04-08
Attending: FAMILY MEDICINE
Payer: MEDICARE

## 2017-04-08 PROBLEM — E11.65 TYPE 2 DIABETES MELLITUS WITH HYPERGLYCEMIA (H): Status: ACTIVE | Noted: 2017-04-08

## 2017-04-08 PROBLEM — I50.9 HEART FAILURE, CHRONIC, WITH ACUTE DECOMPENSATION (H): Status: ACTIVE | Noted: 2017-04-08

## 2017-04-08 PROBLEM — J10.1 INFLUENZA B: Status: ACTIVE | Noted: 2017-04-08

## 2017-04-08 PROBLEM — J18.9 HCAP (HEALTHCARE-ASSOCIATED PNEUMONIA): Status: ACTIVE | Noted: 2017-04-08

## 2017-04-08 LAB
ANION GAP SERPL CALCULATED.3IONS-SCNC: 12 MMOL/L (ref 3–14)
BACTERIA SPEC CULT: NO GROWTH
BACTERIA SPEC CULT: NO GROWTH
BASOPHILS # BLD AUTO: 0 10E9/L (ref 0–0.2)
BASOPHILS NFR BLD AUTO: 0.3 %
BUN SERPL-MCNC: 14 MG/DL (ref 7–30)
CALCIUM SERPL-MCNC: 8.1 MG/DL (ref 8.5–10.1)
CHLORIDE SERPL-SCNC: 107 MMOL/L (ref 94–109)
CO2 SERPL-SCNC: 24 MMOL/L (ref 20–32)
CREAT SERPL-MCNC: 0.55 MG/DL (ref 0.52–1.04)
DIFFERENTIAL METHOD BLD: ABNORMAL
EOSINOPHIL # BLD AUTO: 0 10E9/L (ref 0–0.7)
EOSINOPHIL NFR BLD AUTO: 0 %
ERYTHROCYTE [DISTWIDTH] IN BLOOD BY AUTOMATED COUNT: 19 % (ref 10–15)
GFR SERPL CREATININE-BSD FRML MDRD: ABNORMAL ML/MIN/1.7M2
GLUCOSE BLDC GLUCOMTR-MCNC: 119 MG/DL (ref 70–99)
GLUCOSE BLDC GLUCOMTR-MCNC: 145 MG/DL (ref 70–99)
GLUCOSE BLDC GLUCOMTR-MCNC: 149 MG/DL (ref 70–99)
GLUCOSE BLDC GLUCOMTR-MCNC: 150 MG/DL (ref 70–99)
GLUCOSE BLDC GLUCOMTR-MCNC: 152 MG/DL (ref 70–99)
GLUCOSE BLDC GLUCOMTR-MCNC: 191 MG/DL (ref 70–99)
GLUCOSE BLDC GLUCOMTR-MCNC: 233 MG/DL (ref 70–99)
GLUCOSE BLDC GLUCOMTR-MCNC: 320 MG/DL (ref 70–99)
GLUCOSE BLDC GLUCOMTR-MCNC: 348 MG/DL (ref 70–99)
GLUCOSE BLDC GLUCOMTR-MCNC: 444 MG/DL (ref 70–99)
GLUCOSE SERPL-MCNC: 138 MG/DL (ref 70–99)
HCT VFR BLD AUTO: 28 % (ref 35–47)
HGB BLD-MCNC: 8.1 G/DL (ref 11.7–15.7)
IMM GRANULOCYTES # BLD: 0 10E9/L (ref 0–0.4)
IMM GRANULOCYTES NFR BLD: 0.6 %
INR PPP: 3.09 (ref 0.86–1.14)
KETONES UR STRIP-MCNC: NEGATIVE MG/DL
LYMPHOCYTES # BLD AUTO: 1 10E9/L (ref 0.8–5.3)
LYMPHOCYTES NFR BLD AUTO: 14.4 %
MAGNESIUM SERPL-MCNC: 2.3 MG/DL (ref 1.6–2.3)
MCH RBC QN AUTO: 20.9 PG (ref 26.5–33)
MCHC RBC AUTO-ENTMCNC: 28.9 G/DL (ref 31.5–36.5)
MCV RBC AUTO: 72 FL (ref 78–100)
MICRO REPORT STATUS: NORMAL
MICRO REPORT STATUS: NORMAL
MONOCYTES # BLD AUTO: 0.4 10E9/L (ref 0–1.3)
MONOCYTES NFR BLD AUTO: 6.5 %
NEUTROPHILS # BLD AUTO: 5.3 10E9/L (ref 1.6–8.3)
NEUTROPHILS NFR BLD AUTO: 78.2 %
NRBC # BLD AUTO: 0 10*3/UL
NRBC BLD AUTO-RTO: 0 /100
PLATELET # BLD AUTO: 116 10E9/L (ref 150–450)
POTASSIUM SERPL-SCNC: 4 MMOL/L (ref 3.4–5.3)
PROCALCITONIN SERPL-MCNC: 0.05 NG/ML
RBC # BLD AUTO: 3.88 10E12/L (ref 3.8–5.2)
SODIUM SERPL-SCNC: 143 MMOL/L (ref 133–144)
SPECIMEN SOURCE: NORMAL
SPECIMEN SOURCE: NORMAL
WBC # BLD AUTO: 6.8 10E9/L (ref 4–11)

## 2017-04-08 PROCEDURE — 97116 GAIT TRAINING THERAPY: CPT | Mod: GP | Performed by: PHYSICAL THERAPIST

## 2017-04-08 PROCEDURE — 00000146 ZZHCL STATISTIC GLUCOSE BY METER IP

## 2017-04-08 PROCEDURE — 12000008 ZZH R&B INTERMEDIATE UMMC

## 2017-04-08 PROCEDURE — A9270 NON-COVERED ITEM OR SERVICE: HCPCS | Mod: GY | Performed by: STUDENT IN AN ORGANIZED HEALTH CARE EDUCATION/TRAINING PROGRAM

## 2017-04-08 PROCEDURE — 80048 BASIC METABOLIC PNL TOTAL CA: CPT | Performed by: HOSPITALIST

## 2017-04-08 PROCEDURE — 97161 PT EVAL LOW COMPLEX 20 MIN: CPT | Mod: GP | Performed by: PHYSICAL THERAPIST

## 2017-04-08 PROCEDURE — A9270 NON-COVERED ITEM OR SERVICE: HCPCS | Mod: GY | Performed by: FAMILY MEDICINE

## 2017-04-08 PROCEDURE — 25000132 ZZH RX MED GY IP 250 OP 250 PS 637: Mod: GY | Performed by: HOSPITALIST

## 2017-04-08 PROCEDURE — 25000125 ZZHC RX 250: Performed by: FAMILY MEDICINE

## 2017-04-08 PROCEDURE — 25000132 ZZH RX MED GY IP 250 OP 250 PS 637: Mod: GY | Performed by: STUDENT IN AN ORGANIZED HEALTH CARE EDUCATION/TRAINING PROGRAM

## 2017-04-08 PROCEDURE — A9270 NON-COVERED ITEM OR SERVICE: HCPCS | Mod: GY | Performed by: HOSPITALIST

## 2017-04-08 PROCEDURE — 94640 AIRWAY INHALATION TREATMENT: CPT | Mod: 76

## 2017-04-08 PROCEDURE — 83735 ASSAY OF MAGNESIUM: CPT | Performed by: HOSPITALIST

## 2017-04-08 PROCEDURE — 40000193 ZZH STATISTIC PT WARD VISIT: Performed by: PHYSICAL THERAPIST

## 2017-04-08 PROCEDURE — 25000132 ZZH RX MED GY IP 250 OP 250 PS 637: Mod: GY | Performed by: FAMILY MEDICINE

## 2017-04-08 PROCEDURE — 25000125 ZZHC RX 250

## 2017-04-08 PROCEDURE — 97530 THERAPEUTIC ACTIVITIES: CPT | Mod: GP | Performed by: PHYSICAL THERAPIST

## 2017-04-08 PROCEDURE — 84145 PROCALCITONIN (PCT): CPT | Performed by: HOSPITALIST

## 2017-04-08 PROCEDURE — 40000275 ZZH STATISTIC RCP TIME EA 10 MIN

## 2017-04-08 PROCEDURE — 25000128 H RX IP 250 OP 636: Performed by: FAMILY MEDICINE

## 2017-04-08 PROCEDURE — 36415 COLL VENOUS BLD VENIPUNCTURE: CPT | Performed by: HOSPITALIST

## 2017-04-08 PROCEDURE — 71010 XR CHEST PORT 1 VW: CPT

## 2017-04-08 PROCEDURE — 81003 URINALYSIS AUTO W/O SCOPE: CPT | Performed by: FAMILY MEDICINE

## 2017-04-08 PROCEDURE — 85025 COMPLETE CBC W/AUTO DIFF WBC: CPT | Performed by: HOSPITALIST

## 2017-04-08 PROCEDURE — 85610 PROTHROMBIN TIME: CPT | Performed by: HOSPITALIST

## 2017-04-08 RX ORDER — WARFARIN SODIUM 3 MG/1
3 TABLET ORAL
Status: COMPLETED | OUTPATIENT
Start: 2017-04-08 | End: 2017-04-08

## 2017-04-08 RX ORDER — LEVOFLOXACIN 750 MG/1
750 TABLET, FILM COATED ORAL EVERY 24 HOURS
Status: DISCONTINUED | OUTPATIENT
Start: 2017-04-08 | End: 2017-04-11

## 2017-04-08 RX ORDER — ACETYLCYSTEINE 200 MG/ML
2 SOLUTION ORAL; RESPIRATORY (INHALATION) EVERY 6 HOURS
Status: DISCONTINUED | OUTPATIENT
Start: 2017-04-09 | End: 2017-04-09

## 2017-04-08 RX ADMIN — OSELTAMIVIR PHOSPHATE 75 MG: 75 CAPSULE ORAL at 09:27

## 2017-04-08 RX ADMIN — IPRATROPIUM BROMIDE AND ALBUTEROL SULFATE 3 ML: .5; 3 SOLUTION RESPIRATORY (INHALATION) at 00:31

## 2017-04-08 RX ADMIN — Medication 50 MG: at 00:40

## 2017-04-08 RX ADMIN — FUROSEMIDE 40 MG: 40 TABLET ORAL at 16:55

## 2017-04-08 RX ADMIN — WARFARIN SODIUM 3 MG: 3 TABLET ORAL at 17:58

## 2017-04-08 RX ADMIN — ACETAMINOPHEN 500 MG: 325 TABLET, FILM COATED ORAL at 15:00

## 2017-04-08 RX ADMIN — SPIRONOLACTONE 25 MG: 25 TABLET ORAL at 09:25

## 2017-04-08 RX ADMIN — OSELTAMIVIR PHOSPHATE 75 MG: 75 CAPSULE ORAL at 20:04

## 2017-04-08 RX ADMIN — IPRATROPIUM BROMIDE AND ALBUTEROL SULFATE 3 ML: .5; 3 SOLUTION RESPIRATORY (INHALATION) at 04:04

## 2017-04-08 RX ADMIN — Medication 1 CAPSULE: at 16:55

## 2017-04-08 RX ADMIN — IPRATROPIUM BROMIDE AND ALBUTEROL SULFATE 3 ML: .5; 3 SOLUTION RESPIRATORY (INHALATION) at 17:32

## 2017-04-08 RX ADMIN — Medication 1 CAPSULE: at 09:26

## 2017-04-08 RX ADMIN — PREDNISONE 40 MG: 20 TABLET ORAL at 11:46

## 2017-04-08 RX ADMIN — GUAIFENESIN AND DEXTROMETHORPHAN 10 ML: 100; 10 SYRUP ORAL at 21:37

## 2017-04-08 RX ADMIN — Medication 1 CAPSULE: at 11:47

## 2017-04-08 RX ADMIN — PAROXETINE HYDROCHLORIDE 10 MG: 10 TABLET, FILM COATED ORAL at 20:05

## 2017-04-08 RX ADMIN — PANTOPRAZOLE SODIUM 40 MG: 20 TABLET, DELAYED RELEASE ORAL at 09:26

## 2017-04-08 RX ADMIN — PIPERACILLIN AND TAZOBACTAM 3.38 G: 3; .375 INJECTION, POWDER, LYOPHILIZED, FOR SOLUTION INTRAVENOUS; PARENTERAL at 01:03

## 2017-04-08 RX ADMIN — FUROSEMIDE 40 MG: 40 TABLET ORAL at 09:25

## 2017-04-08 RX ADMIN — IPRATROPIUM BROMIDE AND ALBUTEROL SULFATE 3 ML: .5; 3 SOLUTION RESPIRATORY (INHALATION) at 19:43

## 2017-04-08 RX ADMIN — KETOCONAZOLE: 20 CREAM TOPICAL at 09:33

## 2017-04-08 RX ADMIN — METOPROLOL SUCCINATE 100 MG: 100 TABLET, FILM COATED, EXTENDED RELEASE ORAL at 09:25

## 2017-04-08 RX ADMIN — IPRATROPIUM BROMIDE AND ALBUTEROL SULFATE 3 ML: .5; 3 SOLUTION RESPIRATORY (INHALATION) at 13:51

## 2017-04-08 RX ADMIN — IPRATROPIUM BROMIDE AND ALBUTEROL SULFATE 3 ML: .5; 3 SOLUTION RESPIRATORY (INHALATION) at 09:42

## 2017-04-08 RX ADMIN — LEVOFLOXACIN 750 MG: 750 TABLET, FILM COATED ORAL at 10:00

## 2017-04-08 RX ADMIN — MONTELUKAST SODIUM 10 MG: 10 TABLET, FILM COATED ORAL at 20:04

## 2017-04-08 RX ADMIN — HUMAN INSULIN 6 UNITS/HR: 100 INJECTION, SOLUTION SUBCUTANEOUS at 00:07

## 2017-04-08 ASSESSMENT — ENCOUNTER SYMPTOMS
VOMITING: 0
DIZZINESS: 0
CHILLS: 0
COUGH: 1
DIARRHEA: 0
NERVOUS/ANXIOUS: 0
NAUSEA: 0
LIGHT-HEADEDNESS: 0
ABDOMINAL PAIN: 0
SHORTNESS OF BREATH: 0
CONFUSION: 0
FEVER: 0
CONSTIPATION: 0
HEADACHES: 0

## 2017-04-08 NOTE — PLAN OF CARE
Problem: Pneumonia (Adult)  Goal: Signs and Symptoms of Listed Potential Problems Will be Absent or Manageable (Pneumonia)  Signs and symptoms of listed potential problems will be absent or manageable by discharge/transition of care (reference Pneumonia (Adult) CPG).   Outcome: Improving  D. Improving able to communicate needs - up out of bed with assist - amb to [place]- very short of breath with audible wheezes with activity  A poor pulm function  I cont to monitor

## 2017-04-08 NOTE — PLAN OF CARE
Problem: Goal Outcome Summary  Goal: Goal Outcome Summary  PT 4AB: Evaluation completed, treatment initiated.  Patient stood and ambulated 175' without AD and SBA.  Became SOB during walk leading to self corrected gait deviations and need for seated rest, SpO2 remained 94-96% on RA during periods of SOB.       Anticipate patient able to discharge to home setting when stable.

## 2017-04-08 NOTE — PLAN OF CARE
Problem: Goal Outcome Summary  Goal: Goal Outcome Summary  Outcome: Improving  D: 76 F with influenza A.  I/A:  Alert but forgetful. Orientated. Afebrile. Chronic a-fib, HR 80-90s. Stable BP. Weaned to 2L NC. Diminished lungs with fine crackles and wheezing. Frequent cough. C/o dyspnea with exertion. Fair appetite. Voiding. Insulin gtt infusing, will increase to alg #2 if no decrease in BG. Cont on abx.    P: Titrate insulin gtt. Continue with POC and update team with concerns.

## 2017-04-08 NOTE — PROGRESS NOTES
[facility] Medicine - Inpatient daily progress note    Date of admission: 4/2/2017  Date of service: 4/8/2017.           Assessment and Plan:      Betty Tee is a 76 year old who was admitted AMS and  for evaluation of persistent cough, dizziness and found to be positive for influenza. She developed an  acute hypoxic respiratory failure 4/6 that is due to pulmonary congestion.     Patient Active Problem List   Diagnosis     Temporomandibular joint disorder     Diverticulitis of colon     Disorder of bone and cartilage     Osteoarthritis     Alcohol abuse, in remission     Restless legs syndrome (RLS)     Major depressive disorder, recurrent episode, moderate     Essential hypertension with goal blood pressure less than 140/90     CARDIOVASCULAR SCREENING; LDL GOAL LESS THAN 130     Sciatica     Advanced directives, counseling/discussion     Colouterine fistula     Atrial fibrillation with controlled ventricular response (H)     Left ventricular hypertrophy     Health Care Home     Insomnia     Joint pains     Allergic reaction caused by a drug - likely plaquenil     Breast fibroadenoma     DVT (deep venous thrombosis) (H)     Fatty liver disease, nonalcoholic     Rib pain     Neck mass     Gastroesophageal reflux disease without esophagitis     Enlarged lymph node     Sjogren's syndrome (H)     ANGELICA (obstructive sleep apnea)     ILD (interstitial lung disease) (H)     Lower GI bleed     Acute and chronic respiratory failure with hypoxia (H)     Acute edema of lung (H)     (HFpEF) heart failure with preserved ejection fraction (H)     Type 2 diabetes mellitus without complication, without long-term current use of insulin (H)     CAP (community acquired pneumonia)         ## Acute hypoxic respiratory failure, pulmonary congestion :  Improving  Multifactorial from acute decompensation of her HFpEF in setting of acute respiratory illness and holding her diuretics in context of ILD.  Her oxygen  requirement has decreased after diuresis . Currently not needing oxygen at rest but needs oxygen support when walking.    Plan:  - Will continue home Lasix 40 gm BID and Spironolactone 25 mg daily   -To continue Metoprolol   -Daily weights  -Input and Output   -IV Lasix as needed     ## HFpEF, with recent decompensation, improved   Plan:  - as above   -Low salt diet     ##Possible Hospital Acquired Pneumonia   ##Influenza:  ##ILD   ##Reactive Airway   Patient on last day of Tamiflu.  At risk for Staph Pneumonia but negative nasal  MRSA . No red flags for Staph pneumonia. Antibiotics was broadened during acute respiratory failure. Her  blood culture has been negative and pro calcitonin level is not suggestive of a bacterial infection .  However  her xray today showed more haziness on the left lung field that superimposed bacterial infection can not be ruled out.     Plan:   -DC IV antibiotics and switch to Levofloxacin   - Follow up blood cultures   - Bipap at night  and/or HFNC to keep O2 > 92%  - On baseline prednisone 10 mg daily , we have increased this to 40 mg during decompensation . -Will start to wean, go down by 10 mg every 3 days until at 10 mg daily .   - Continue fluticasone daily  - Continue duonebs q 4 hours  - Continue albuterol nebs q 2 hours prn  - Lactobacillus  -Robitussin for cough     ## Dizziness/AMS- resolved  Likely multifactorial : Hypoxia and acute respiratory illness. Negative ACS and stroke work up.   Plan :   Observe, address underlying etiology      ## A Fib ,rate controlled   INR goal 2.0-3.0, patient currently at goal  Plan:   -continue Metoprolol and warfarin     ## HTN , within acceptable ranges     Plan:   - Cont metoprolol 100 mg daily.  - Continue spironolactone 25 mg daily    ## Diabetes Mellitus- uncontrolled  Hgb A1c 14.3 (4/2017).  Was started on an Insulin drip yesterday for uncontrolled hyperglycemia secondary to recent increase steroid dose. Sugars not stable .     Plan:    - Transitioned to SC Insulin as follows:   Lantus 19 units. Will stop the IV drip 2 hours after  SC Insulin     Prandial Coverage :   1 unit /13 gram CHO  MSSI  Will titrate     ##ANGELICA  Plan:   -Bipap at night     ##Sjorgren Syndrome, Stable   Plan:   -Will observe     Daily cares -   F:none  E:stable  N:regular diet  Lines:PIV  Activity:-Up as tolerated  CODE:Full Code  Prophylaxis:On warfarin  PCP communication:  - Ilene Tristan     Dispo: Expected discharge date 2-3 days pending on clinical improvement.             Interval History:   Betty Tee was started on an insulin drip to hyperglycemia control . She is reporting that her breathing is better. No acute events overnight. Nurses noted were reviewed.               Review of Systems:   Review of Systems   Constitutional: Negative for chills and fever.   HENT: Negative for congestion.    Respiratory: Positive for cough. Negative for shortness of breath.    Cardiovascular: Negative for chest pain.   Gastrointestinal: Negative for abdominal pain, constipation, diarrhea, nausea and vomiting.   Neurological: Negative for dizziness, light-headedness and headaches.   Psychiatric/Behavioral: Negative for confusion. The patient is not nervous/anxious.             Physical Exam (Resident / Clinician):   Vitals were reviewed  Temp: 98.2  F (36.8  C) Temp src: Oral BP: (!) 136/103   Heart Rate: 101 Resp: 20 SpO2: 96 % O2 Device: None (Room air) Oxygen Delivery: Bipap 10/5, 40% FiO2    Physical Exam   Constitutional: She is oriented to person, place, and time. She appears well-developed and well-nourished. No distress.   Sitting in chair, not in distress   Neck: Normal range of motion. No JVD present.   Cardiovascular: Normal rate and intact distal pulses.    No murmur heard.  Pulmonary/Chest: Effort normal. No stridor. No respiratory distress. She has wheezes.   Crackles at bases bilaterally.   Abdominal: Soft. Bowel sounds are normal. She exhibits no  distension. There is no tenderness.   Musculoskeletal: Normal range of motion. She exhibits no edema or tenderness.   Neurological: She is alert and oriented to person, place, and time.   Skin: Skin is warm and dry.   Psychiatric: She has a normal mood and affect. Her behavior is normal. Judgment and thought content normal.   Vitals reviewed.          Data:   ROUTINE LABS (Last four results)  CMP    Recent Labs  Lab 04/08/17 0345 04/07/17 0313 04/06/17  1944 04/06/17  0723 04/05/17  0726  04/02/17  2147    140 140 140 140  < > 143   POTASSIUM 4.0 4.1 3.9 4.0 4.0  < > 3.3*   CHLORIDE 107 105 107 107 106  < > 108   CO2 24 24 25 23 25  < > 23   ANIONGAP 12 10 8 10 8  < > 12   * 229* 291* 213* 130*  < > 213*   BUN 14 14 12 11 13  < > 7   CR 0.55 0.61 0.61 0.68 0.68  < > 0.58   GFRESTIMATED >90Non  GFR Calc >90Non  GFR Calc >90Non  GFR Calc 83 85  < > >90Non  GFR Calc   GFRESTBLACK >90African American GFR Calc >90African American GFR Calc >90African American GFR Calc >90African American GFR Calc >90African American GFR Calc  < > >90African American GFR Calc   CLAUDY 8.1* 7.8* 7.8* 8.3* 8.6  < > 8.2*   MAG 2.3  --  2.0 2.1 2.0  < >  --    PHOS  --   --  2.2* 3.2 2.4*  --   --    PROTTOTAL  --   --   --   --   --   --  7.0   ALBUMIN  --   --   --   --   --   --  3.0*   BILITOTAL  --   --   --   --   --   --  0.6   ALKPHOS  --   --   --   --   --   --  72   AST  --   --   --   --   --   --  29   ALT  --   --   --   --   --   --  19   < > = values in this interval not displayed.  CBC    Recent Labs  Lab 04/08/17 0345 04/07/17 0313 04/06/17  0723 04/05/17  0726   WBC 6.8 4.4 5.8 6.3   RBC 3.88 4.19 4.48 4.09   HGB 8.1* 8.8* 9.5* 8.4*   HCT 28.0* 31.6* 33.4* 30.4*   MCV 72* 75* 75* 74*   MCH 20.9* 21.0* 21.2* 20.5*   MCHC 28.9* 27.8* 28.4* 27.6*   RDW 19.0* 19.1* 19.0* 18.7*   * 193 216 191     INR    Recent Labs  Lab 04/08/17  2802  04/07/17  0313 04/06/17  0723 04/05/17  0726   INR 3.09* 2.03* 2.02* 1.67*     CRP    Recent Labs  Lab 04/06/17  0723 04/04/17  0858   CRP 64.0* 54.0*         Recent Labs  Lab 04/06/17  1055 04/06/17  1028 04/03/17  1045 04/03/17  0740 04/02/17  2327 04/02/17  2211 04/02/17  2147   CULT No growth after 2 days No growth after 2 days Light growth Normal juan >10 Squamous epithelial cells/low power field indicates oral contamination. Please recollect.Canceled, Test credited* 10,000 to 50,000 colonies/mL mixed urogenital juan No growth No growth             Medications:     Current Facility-Administered Medications   Medication     levofloxacin (LEVAQUIN) tablet 750 mg     insulin glargine (LANTUS) injection 19 Units     insulin aspart (NovoLOG) inj (RAPID ACTING)     insulin aspart (NovoLOG) inj (RAPID ACTING)     [START ON 4/9/2017] insulin aspart (NovoLOG) inj (RAPID ACTING)     insulin aspart (NovoLOG) inj (RAPID ACTING)     insulin aspart (NovoLOG) inj (RAPID ACTING)     insulin aspart (NovoLOG) inj (RAPID ACTING)     furosemide (LASIX) tablet 40 mg     predniSONE (DELTASONE) tablet 40 mg     pantoprazole (PROTONIX) EC tablet 40 mg     insulin 1 unit/mL in saline (NovoLIN, HumuLIN Regular) drip - ADULT IV Infusion     glucose 40 % gel 15-30 g    Or     dextrose 50 % injection 25-50 mL    Or     glucagon injection 1 mg     dextrose 10 % 1,000 mL infusion     ipratropium - albuterol 0.5 mg/2.5 mg/3 mL (DUONEB) neb solution 3 mL     oseltamivir (TAMIFLU) capsule 75 mg     traZODone (DESYREL) half-tab 25-50 mg     spironolactone (ALDACTONE) tablet 25 mg     acetaminophen (TYLENOL) tablet 1,000 mg    Or     acetaminophen (TYLENOL) Suppository 650 mg     lactobacillus rhamnosus (GG) (CULTURELL) capsule 1 capsule     fluticasone (FLONASE) 50 MCG/ACT spray 1-2 spray     fluticasone furoate (ARNUITY ELLIPTA) 200 MCG/ACT inhalation powder 1 puff     ketoconazole (NIZORAL) 2 % cream     polyethylene glycol  (MIRALAX/GLYCOLAX) Packet 17 g     montelukast (SINGULAIR) tablet 10 mg     PARoxetine (PAXIL) tablet 10 mg     Warfarin Therapy Reminder (Check START DATE - warfarin may be starting in the FUTURE)     naloxone (NARCAN) injection 0.1-0.4 mg     lidocaine 1 % 1 mL     lidocaine (LMX4) kit     sodium chloride (PF) 0.9% PF flush 3 mL     sodium chloride (PF) 0.9% PF flush 3 mL     Patient is already receiving anticoagulation with heparin, enoxaparin (LOVENOX), warfarin (COUMADIN)  or other anticoagulant medication     albuterol neb solution 2.5 mg     guaiFENesin-dextromethorphan (ROBITUSSIN DM) 100-10 MG/5ML syrup 10 mL     potassium chloride SA (K-DUR/KLOR-CON M) CR tablet 20-40 mEq     potassium chloride (KLOR-CON) Packet 20-40 mEq     potassium chloride 10 mEq in 100 mL intermittent infusion     potassium chloride 10 mEq in 100 mL intermittent infusion with 10 mg lidocaine     potassium chloride 20 mEq in 50 mL intermittent infusion     metoprolol (TOPROL-XL) 24 hr tablet 100 mg       Caring Physician: Coty Moreno MD  [facility]  Pager Contact: see Physician sticky note

## 2017-04-08 NOTE — PLAN OF CARE
N: Alert/oriented but forgetful. Calls appropriately. Barely slept at all.  CV: Atrial fib, rate controlled. -120, lowest 86/72 when pt finally fell asleep.  R: 2L n/c, audible expiratory wheezes frequently throughout night with improvement post neb tx. Sats mid 90s.  GI/: Good UOP. No BM, no nausea. Urine sample negative for ketones.  Lines/Meds: PIV x2 @ TKO. Insulin gtt @ 2 units/hr per algorithm 2. 0600 BG is 145. Trazadone 50mg given for insomnia with no result.    Plan: Wean insulin, transition to sc. Address meds for sleep. Respiratory support. Possibly tx to floor. Call team with changes.

## 2017-04-08 NOTE — PLAN OF CARE
Problem: Pneumonia (Adult)  Goal: Signs and Symptoms of Listed Potential Problems Will be Absent or Manageable (Pneumonia)  Signs and symptoms of listed potential problems will be absent or manageable by discharge/transition of care (reference Pneumonia (Adult) CPG).   Outcome: Improving  Pt hemodynamically stable throughout shift. Stable in Afib CVR. Weaned to room air most of day. Transitioning to ss and cc insulin. Up to commode with SBA. Has transfer orders for 6B, awaiting bed availability. See MAR and flow sheet for additional documentation.

## 2017-04-08 NOTE — PROGRESS NOTES
04/08/17 1100   Quick Adds   Type of Visit Initial PT Evaluation       Present no   Living Environment   Lives With friend(s)   Living Arrangements house   Home Accessibility stairs to enter home   Number of Stairs to Enter Home 3  (1 rail)   Number of Stairs Within Home 10  (1 rail)   Transportation Available car;family or friend will provide   Living Environment Comment friend/roommate available to help as needed   Self-Care   Dominant Hand right   Usual Activity Tolerance good   Current Activity Tolerance moderate   Regular Exercise no   Equipment Currently Used at Home oxygen;other (see comments)  (has 4WW but does not use)   Functional Level Prior   Ambulation 0-->independent   Transferring 0-->independent   Toileting 0-->independent   Bathing 0-->independent   Dressing 0-->independent   Eating 0-->independent   Communication 0-->understands/communicates without difficulty   Swallowing 0-->swallows foods/liquids without difficulty   Cognition 0 - no cognition issues reported   Fall history within last six months yes   Number of times patient has fallen within last six months 1  (fell off chair when chair leg broke)   Prior Functional Level Comment independent with all functional mobility and ADLs,  used home O2 prn, has 4WW but does not use   General Information   Onset of Illness/Injury or Date of Surgery - Date 04/02/17   Referring Physician Cynthia Sánchez MD   Patient/Family Goals Statement return home   Pertinent History of Current Problem (include personal factors and/or comorbidities that impact the POC) Betty Tee is a 76 year old who was admitted AMS and  for evaluation of persistent cough, dizziness and found to be positive for influenza, went into acute hypoxic respiratory failure 4/6.   Cognitive Status Examination   Orientation orientation to person, place and time   Level of Consciousness alert   Follows Commands and Answers Questions 100% of the time   Personal  "Safety and Judgment intact   Pain Assessment   Patient Currently in Pain No   Integumentary/Edema   Integumentary/Edema no deficits were identifed   Posture    Posture Forward head position;Protracted shoulders   Range of Motion (ROM)   ROM Comment B LE grossly WNL   Strength   Strength Comments B LE grossly 5/5   Bed Mobility   Bed Mobility Comments independent   Transfer Skills   Transfer Comments sit > stand SBA   Gait   Gait Comments ambulate in room with SBA   Balance   Balance Comments stand without UE support   Sensory Examination   Sensory Perception no deficits were identified   General Therapy Interventions   Planned Therapy Interventions balance training;gait training;progressive activity/exercise   Clinical Impression   Criteria for Skilled Therapeutic Intervention yes, treatment indicated   PT Diagnosis impaired functional mobility 2/2 influenza   Influenced by the following impairments decreased balance, decreased functional endurance   Functional limitations due to impairments impaired gait   Clinical Presentation Evolving/Changing   Clinical Presentation Rationale clinical judgment   Clinical Decision Making (Complexity) Low complexity   Therapy Frequency` daily   Predicted Duration of Therapy Intervention (days/wks) 3 days   Anticipated Discharge Disposition Home with Assist   Risk & Benefits of therapy have been explained Yes   Patient, Family & other staff in agreement with plan of care Yes   [facility] AM-PAC  \"6 Clicks\" V.2 Basic Mobility Inpatient Short Form   1. Turning from your back to your side while in a flat bed without using bedrails? 4 - None   2. Moving from lying on your back to sitting on the side of a flat bed without using bedrails? 4 - None   3. Moving to and from a bed to a chair (including a wheelchair)? 4 - None   4. Standing up from a chair using your arms (e.g., wheelchair, or bedside chair)? 4 - None   5. To walk in hospital room? 4 - None   6. Climbing 3-5 steps with " a railing? 3 - A Little   Basic Mobility Raw Score (Score out of 24.Lower scores equate to lower levels of function) 23   Total Evaluation Time   Total Evaluation Time (Minutes) 7

## 2017-04-09 LAB
ANION GAP SERPL CALCULATED.3IONS-SCNC: 12 MMOL/L (ref 3–14)
BASOPHILS # BLD AUTO: 0 10E9/L (ref 0–0.2)
BASOPHILS NFR BLD AUTO: 0.1 %
BUN SERPL-MCNC: 15 MG/DL (ref 7–30)
CALCIUM SERPL-MCNC: 8.4 MG/DL (ref 8.5–10.1)
CHLORIDE SERPL-SCNC: 104 MMOL/L (ref 94–109)
CO2 SERPL-SCNC: 26 MMOL/L (ref 20–32)
CREAT SERPL-MCNC: 0.68 MG/DL (ref 0.52–1.04)
DIFFERENTIAL METHOD BLD: ABNORMAL
EOSINOPHIL # BLD AUTO: 0 10E9/L (ref 0–0.7)
EOSINOPHIL NFR BLD AUTO: 0 %
ERYTHROCYTE [DISTWIDTH] IN BLOOD BY AUTOMATED COUNT: 19.2 % (ref 10–15)
GFR SERPL CREATININE-BSD FRML MDRD: 84 ML/MIN/1.7M2
GLUCOSE BLDC GLUCOMTR-MCNC: 125 MG/DL (ref 70–99)
GLUCOSE BLDC GLUCOMTR-MCNC: 130 MG/DL (ref 70–99)
GLUCOSE BLDC GLUCOMTR-MCNC: 165 MG/DL (ref 70–99)
GLUCOSE BLDC GLUCOMTR-MCNC: 199 MG/DL (ref 70–99)
GLUCOSE BLDC GLUCOMTR-MCNC: 239 MG/DL (ref 70–99)
GLUCOSE BLDC GLUCOMTR-MCNC: 280 MG/DL (ref 70–99)
GLUCOSE BLDC GLUCOMTR-MCNC: 314 MG/DL (ref 70–99)
GLUCOSE SERPL-MCNC: 205 MG/DL (ref 70–99)
HCT VFR BLD AUTO: 30.5 % (ref 35–47)
HGB BLD-MCNC: 8.4 G/DL (ref 11.7–15.7)
IMM GRANULOCYTES # BLD: 0 10E9/L (ref 0–0.4)
IMM GRANULOCYTES NFR BLD: 0.4 %
INR PPP: 3.76 (ref 0.86–1.14)
LYMPHOCYTES # BLD AUTO: 1.2 10E9/L (ref 0.8–5.3)
LYMPHOCYTES NFR BLD AUTO: 15.8 %
MCH RBC QN AUTO: 20.6 PG (ref 26.5–33)
MCHC RBC AUTO-ENTMCNC: 27.5 G/DL (ref 31.5–36.5)
MCV RBC AUTO: 75 FL (ref 78–100)
MONOCYTES # BLD AUTO: 0.7 10E9/L (ref 0–1.3)
MONOCYTES NFR BLD AUTO: 8.8 %
NEUTROPHILS # BLD AUTO: 5.7 10E9/L (ref 1.6–8.3)
NEUTROPHILS NFR BLD AUTO: 74.9 %
NRBC # BLD AUTO: 0 10*3/UL
NRBC BLD AUTO-RTO: 0 /100
PLATELET # BLD AUTO: 233 10E9/L (ref 150–450)
PLATELET # BLD EST: ABNORMAL 10*3/UL
POTASSIUM SERPL-SCNC: 3.5 MMOL/L (ref 3.4–5.3)
RBC # BLD AUTO: 4.08 10E12/L (ref 3.8–5.2)
SODIUM SERPL-SCNC: 142 MMOL/L (ref 133–144)
WBC # BLD AUTO: 7.6 10E9/L (ref 4–11)

## 2017-04-09 PROCEDURE — 85610 PROTHROMBIN TIME: CPT | Performed by: HOSPITALIST

## 2017-04-09 PROCEDURE — 00000146 ZZHCL STATISTIC GLUCOSE BY METER IP

## 2017-04-09 PROCEDURE — 12000001 ZZH R&B MED SURG/OB UMMC

## 2017-04-09 PROCEDURE — 25000132 ZZH RX MED GY IP 250 OP 250 PS 637: Mod: GY | Performed by: FAMILY MEDICINE

## 2017-04-09 PROCEDURE — 36415 COLL VENOUS BLD VENIPUNCTURE: CPT | Performed by: HOSPITALIST

## 2017-04-09 PROCEDURE — 85610 PROTHROMBIN TIME: CPT | Performed by: STUDENT IN AN ORGANIZED HEALTH CARE EDUCATION/TRAINING PROGRAM

## 2017-04-09 PROCEDURE — 25000125 ZZHC RX 250: Performed by: HOSPITALIST

## 2017-04-09 PROCEDURE — 25000132 ZZH RX MED GY IP 250 OP 250 PS 637: Mod: GY | Performed by: HOSPITALIST

## 2017-04-09 PROCEDURE — A9270 NON-COVERED ITEM OR SERVICE: HCPCS | Mod: GY | Performed by: FAMILY MEDICINE

## 2017-04-09 PROCEDURE — 25000125 ZZHC RX 250: Performed by: FAMILY MEDICINE

## 2017-04-09 PROCEDURE — A9270 NON-COVERED ITEM OR SERVICE: HCPCS | Mod: GY | Performed by: STUDENT IN AN ORGANIZED HEALTH CARE EDUCATION/TRAINING PROGRAM

## 2017-04-09 PROCEDURE — 94640 AIRWAY INHALATION TREATMENT: CPT | Mod: 76

## 2017-04-09 PROCEDURE — 25000132 ZZH RX MED GY IP 250 OP 250 PS 637: Mod: GY | Performed by: STUDENT IN AN ORGANIZED HEALTH CARE EDUCATION/TRAINING PROGRAM

## 2017-04-09 PROCEDURE — 85025 COMPLETE CBC W/AUTO DIFF WBC: CPT | Performed by: HOSPITALIST

## 2017-04-09 PROCEDURE — 25000125 ZZHC RX 250

## 2017-04-09 PROCEDURE — 80048 BASIC METABOLIC PNL TOTAL CA: CPT | Performed by: HOSPITALIST

## 2017-04-09 PROCEDURE — 40000275 ZZH STATISTIC RCP TIME EA 10 MIN

## 2017-04-09 RX ORDER — METFORMIN HCL 500 MG
1000 TABLET, EXTENDED RELEASE 24 HR ORAL
Status: DISCONTINUED | OUTPATIENT
Start: 2017-04-09 | End: 2017-04-11 | Stop reason: HOSPADM

## 2017-04-09 RX ORDER — LISINOPRIL 2.5 MG/1
2.5 TABLET ORAL DAILY
Status: DISCONTINUED | OUTPATIENT
Start: 2017-04-09 | End: 2017-04-11 | Stop reason: HOSPADM

## 2017-04-09 RX ORDER — ATORVASTATIN CALCIUM 40 MG/1
40 TABLET, FILM COATED ORAL DAILY
Status: DISCONTINUED | OUTPATIENT
Start: 2017-04-09 | End: 2017-04-11 | Stop reason: HOSPADM

## 2017-04-09 RX ORDER — POTASSIUM CHLORIDE 750 MG/1
20 TABLET, EXTENDED RELEASE ORAL DAILY
Status: DISCONTINUED | OUTPATIENT
Start: 2017-04-09 | End: 2017-04-11 | Stop reason: HOSPADM

## 2017-04-09 RX ORDER — IPRATROPIUM BROMIDE AND ALBUTEROL SULFATE 2.5; .5 MG/3ML; MG/3ML
3 SOLUTION RESPIRATORY (INHALATION) 2 TIMES DAILY
Status: DISCONTINUED | OUTPATIENT
Start: 2017-04-10 | End: 2017-04-11 | Stop reason: HOSPADM

## 2017-04-09 RX ORDER — ACETYLCYSTEINE 200 MG/ML
2 SOLUTION ORAL; RESPIRATORY (INHALATION)
Status: DISCONTINUED | OUTPATIENT
Start: 2017-04-09 | End: 2017-04-09

## 2017-04-09 RX ORDER — ACETYLCYSTEINE 200 MG/ML
2 SOLUTION ORAL; RESPIRATORY (INHALATION) 2 TIMES DAILY
Status: DISCONTINUED | OUTPATIENT
Start: 2017-04-10 | End: 2017-04-11 | Stop reason: HOSPADM

## 2017-04-09 RX ADMIN — ACETYLCYSTEINE 2 ML: 200 SOLUTION ORAL; RESPIRATORY (INHALATION) at 21:04

## 2017-04-09 RX ADMIN — METFORMIN HYDROCHLORIDE 1000 MG: 500 TABLET, EXTENDED RELEASE ORAL at 17:14

## 2017-04-09 RX ADMIN — PREDNISONE 30 MG: 20 TABLET ORAL at 14:45

## 2017-04-09 RX ADMIN — GUAIFENESIN AND DEXTROMETHORPHAN 10 ML: 100; 10 SYRUP ORAL at 03:33

## 2017-04-09 RX ADMIN — MONTELUKAST SODIUM 10 MG: 10 TABLET, FILM COATED ORAL at 20:54

## 2017-04-09 RX ADMIN — PANTOPRAZOLE SODIUM 40 MG: 20 TABLET, DELAYED RELEASE ORAL at 08:33

## 2017-04-09 RX ADMIN — ACETYLCYSTEINE 2 ML: 200 INHALANT RESPIRATORY (INHALATION) at 01:16

## 2017-04-09 RX ADMIN — IPRATROPIUM BROMIDE AND ALBUTEROL SULFATE 3 ML: .5; 3 SOLUTION RESPIRATORY (INHALATION) at 21:04

## 2017-04-09 RX ADMIN — IPRATROPIUM BROMIDE AND ALBUTEROL SULFATE 3 ML: .5; 3 SOLUTION RESPIRATORY (INHALATION) at 12:45

## 2017-04-09 RX ADMIN — ACETYLCYSTEINE 2 ML: 200 SOLUTION ORAL; RESPIRATORY (INHALATION) at 12:45

## 2017-04-09 RX ADMIN — ACETYLCYSTEINE 2 ML: 200 SOLUTION ORAL; RESPIRATORY (INHALATION) at 09:03

## 2017-04-09 RX ADMIN — ATORVASTATIN CALCIUM 40 MG: 40 TABLET, FILM COATED ORAL at 14:45

## 2017-04-09 RX ADMIN — OSELTAMIVIR PHOSPHATE 75 MG: 75 CAPSULE ORAL at 20:54

## 2017-04-09 RX ADMIN — IPRATROPIUM BROMIDE AND ALBUTEROL SULFATE 3 ML: .5; 3 SOLUTION RESPIRATORY (INHALATION) at 09:02

## 2017-04-09 RX ADMIN — Medication 1 CAPSULE: at 08:33

## 2017-04-09 RX ADMIN — Medication 1 CAPSULE: at 11:28

## 2017-04-09 RX ADMIN — IPRATROPIUM BROMIDE AND ALBUTEROL SULFATE 3 ML: .5; 3 SOLUTION RESPIRATORY (INHALATION) at 04:10

## 2017-04-09 RX ADMIN — FUROSEMIDE 40 MG: 40 TABLET ORAL at 08:33

## 2017-04-09 RX ADMIN — FLUTICASONE FUROATE 1 PUFF: 200 POWDER RESPIRATORY (INHALATION) at 08:33

## 2017-04-09 RX ADMIN — OSELTAMIVIR PHOSPHATE 75 MG: 75 CAPSULE ORAL at 08:33

## 2017-04-09 RX ADMIN — POTASSIUM CHLORIDE 20 MEQ: 750 TABLET, EXTENDED RELEASE ORAL at 17:14

## 2017-04-09 RX ADMIN — LEVOFLOXACIN 750 MG: 750 TABLET, FILM COATED ORAL at 08:33

## 2017-04-09 RX ADMIN — SPIRONOLACTONE 25 MG: 25 TABLET ORAL at 08:33

## 2017-04-09 RX ADMIN — IPRATROPIUM BROMIDE AND ALBUTEROL SULFATE 3 ML: .5; 3 SOLUTION RESPIRATORY (INHALATION) at 01:15

## 2017-04-09 RX ADMIN — METOPROLOL SUCCINATE 100 MG: 100 TABLET, FILM COATED, EXTENDED RELEASE ORAL at 08:33

## 2017-04-09 RX ADMIN — LISINOPRIL 2.5 MG: 2.5 TABLET ORAL at 10:23

## 2017-04-09 ASSESSMENT — ENCOUNTER SYMPTOMS
COUGH: 1
HEADACHES: 0
NERVOUS/ANXIOUS: 0
FEVER: 0
CONSTIPATION: 0
NAUSEA: 0
DIARRHEA: 0
CONFUSION: 0
CHILLS: 0
ABDOMINAL PAIN: 0
SHORTNESS OF BREATH: 1
VOMITING: 0
LIGHT-HEADEDNESS: 0
DIZZINESS: 0

## 2017-04-09 NOTE — PLAN OF CARE
Problem: Goal Outcome Summary  Goal: Goal Outcome Summary  Outcome: Improving  Patient sitting up in chair most of the day.   Ambulated x 2. Voiding adequately. BM x 1  Continues to have a slight cough but nonproductive. No 02 needs this shift, except with walks.  VSS. Has orders to 5A.  BGs better controlled. Lantus and novolog insulin treatment changed. Metformin started.

## 2017-04-09 NOTE — PLAN OF CARE
Problem: Goal Outcome Summary  Goal: Goal Outcome Summary  Outcome: Improving  D: 76 F with influenza A and respiratory compromise.    I/A: Afebrile. A-fib; Rate 90-110s. Coarse lung sounds with fine crackles. Dyspneic with exertion. Intermittently requires 1L NC (mostly while laying flat). Productive cough. BG high most of the night (highest 444); an additional 8 units of novolog given during the night. Voiding. BM x1. Up with SBA and to chair frequently.   P: Monitor and treat BG, assess need to increase SS. Encourage activity. Continue with POC and update team with concerns.

## 2017-04-09 NOTE — PROGRESS NOTES
[facility] Medicine - Inpatient daily progress note    Date of admission: 4/2/2017  Date of service: 4/9/2017.           Assessment and Plan:      Betty Tee is a 76 year old who was admitted AMS and  for evaluation of persistent cough, dizziness and found to be positive for influenza. She developed an  acute hypoxic respiratory failure 4/6 that is due to pulmonary congestion.     Patient Active Problem List   Diagnosis     Temporomandibular joint disorder     Diverticulitis of colon     Disorder of bone and cartilage     Osteoarthritis     Alcohol abuse, in remission     Restless legs syndrome (RLS)     Major depressive disorder, recurrent episode, moderate     Essential hypertension with goal blood pressure less than 140/90     CARDIOVASCULAR SCREENING; LDL GOAL LESS THAN 130     Sciatica     Advanced directives, counseling/discussion     Colouterine fistula     Atrial fibrillation with controlled ventricular response (H)     Left ventricular hypertrophy     Health Care Home     Insomnia     Joint pains     Allergic reaction caused by a drug - likely plaquenil     Breast fibroadenoma     DVT (deep venous thrombosis) (H)     Fatty liver disease, nonalcoholic     Rib pain     Neck mass     Gastroesophageal reflux disease without esophagitis     Enlarged lymph node     Sjogren's syndrome (H)     ANGELICA (obstructive sleep apnea)     ILD (interstitial lung disease) (H)     Lower GI bleed     Acute and chronic respiratory failure with hypoxia (H)     Acute edema of lung (H)     (HFpEF) heart failure with preserved ejection fraction (H)     Type 2 diabetes mellitus without complication, without long-term current use of insulin (H)     CAP (community acquired pneumonia)     HCAP (healthcare-associated pneumonia)     Influenza B     Heart failure, chronic, with acute decompensation (H)     Type 2 diabetes mellitus with hyperglycemia (H)         ## Acute hypoxic respiratory failure, pulmonary congestion :   Improving  Multifactorial from acute decompensation of her HFpEF in setting of acute respiratory illness and holding her diuretics in context of ILD.  Her oxygen requirement has decreased after diuresis . Currently not needing oxygen at rest but needs oxygen support when walking (uses 4L at home with exertion/walking).    Plan:  - Will continue home Lasix 40 gm BID and Spironolactone 25 mg daily   - Add oral potassium (20 meq daily)  -Continue Metoprolol   -Daily weights  -Input and Output   -IV Lasix as needed     ## HFpEF, with recent decompensation, improved   Plan:  - as above   -Low salt diet     ##Possible Hospital Acquired Pneumonia   ##Influenza:  ##ILD   ##Reactive Airway   Patient on last day of Tamiflu.  At risk for Staph Pneumonia but negative nasal  MRSA . No red flags for Staph pneumonia. Antibiotics was broadened during acute respiratory failure. Her  blood culture has been negative and pro calcitonin level is not suggestive of a bacterial infection .  However  her xray today showed more haziness on the left lung field that superimposed bacterial infection can not be ruled out.     Plan:   - IV antibiotics (Azithromycin & Ceftriaxone followed by Vanc & Zosyn) 4/3-4/8, Levofloxacin started 4/8.  Plan for 7-14 days for all antibiotics  - Follow up blood cultures   - Bipap at night  and/or HFNC to keep O2 > 92%  - On baseline prednisone 10 mg daily , we have increased this to 40 mg during decompensation . - - Will start to wean today, go down by 10 mg every 3 days until at 10 mg daily.   - Continue fluticasone daily  - Continue duonebs q 4 hours  - Continue albuterol nebs q 2 hours prn  - Lactobacillus  - Robitussin for cough     ## Dizziness/AMS- resolved  Likely multifactorial : Hypoxia and acute respiratory illness. Negative ACS and stroke work up.   Plan :   Observe, address underlying etiology      ## A Fib ,rate controlled   INR goal 2.0-3.0, patient currently above goal with INR of 3.76  Plan:    -continue Metoprolol   - Hold coumadin today    ## HTN , within acceptable ranges     Plan:   - Cont metoprolol 100 mg daily.  - Continue spironolactone 25 mg daily    ## Diabetes Mellitus- uncontrolled  Hgb A1c 14.3 (4/2017).  Was started on an Insulin drip 4/7 for uncontrolled hyperglycemia secondary to recent increase steroid dose. Drip discontinued 4/8.  Blood sugars still not stable    Plan:   - Increase Lantus from 19 to 23 units  - Increase Prandial Coverage from 1 unit /13 gram CHO to 1 unit/9 grams CHO  - Continue high ISS  - Restart Metformin 1000 mg daily  - Diabetes education   - Start low dose ACE inhibitor (lisinopril 2.5 mg daily)    ## ASCVD Risk:  ASCVD risk calculated.  10 year risk is 42.6%.  - Start high intensity statin (atorvastatin 40 mg daily)    ##ANGELICA  Plan:   -Bipap at night     ##Sjorgren Syndrome, Stable   Plan:   -Will observe     ## Physical Deconditioning  - PT/OT    Daily cares -   F:none  E:stable  N:regular diet  Lines:PIV  Activity:-Up as tolerated  CODE:Full Code  Prophylaxis:On warfarin  PCP communication:  - Ilene Tristan     Dispo: Expected discharge date 2-3 days pending on clinical improvement.             Interval History:   Betty Tee could not sleep last night due to coughing.  She received neb treatments but continued coughing.  Overall, she states her breathing has improved and she is feeling better.  She ambulated yesterday with oxygen (uses oxygen with activity at home).                Review of Systems:   Review of Systems   Constitutional: Negative for chills and fever.   HENT: Negative for congestion.    Respiratory: Positive for cough and shortness of breath (with ambulation).    Cardiovascular: Negative for chest pain.   Gastrointestinal: Negative for abdominal pain, constipation, diarrhea, nausea and vomiting.   Neurological: Negative for dizziness, light-headedness and headaches.   Psychiatric/Behavioral: Negative for confusion. The patient is  not nervous/anxious.             Physical Exam (Resident / Clinician):   Vitals were reviewed  Temp: 97.8  F (36.6  C) Temp src: Oral BP: (!) 166/97   Heart Rate: 112 Resp: 16 SpO2: 96 % O2 Device: None (Room air) Oxygen Delivery: Bipap 10/5, 40% FiO2    Physical Exam   Constitutional: She is oriented to person, place, and time. She appears well-developed and well-nourished. No distress.   Sitting in chair, not in distress   Neck: Normal range of motion. No JVD present.   Cardiovascular: Normal rate and intact distal pulses.    No murmur heard.  Pulmonary/Chest: Effort normal. No stridor. No respiratory distress. She has no wheezes.   Crackles at bases bilaterally, crissy LLL   Abdominal: Soft. Bowel sounds are normal. She exhibits no distension. There is no tenderness.   Musculoskeletal: Normal range of motion. She exhibits edema (trace edema bilaterally). She exhibits no tenderness.   Neurological: She is alert and oriented to person, place, and time.   Skin: Skin is warm and dry.   Psychiatric: She has a normal mood and affect. Her behavior is normal. Judgment and thought content normal.   Vitals reviewed.          Data:   ROUTINE LABS (Last four results)  CMP    Recent Labs  Lab 04/09/17  0514 04/08/17  0345 04/07/17  0313 04/06/17  1944 04/06/17  0723 04/05/17  0726  04/02/17  2147    143 140 140 140 140  < > 143   POTASSIUM 3.5 4.0 4.1 3.9 4.0 4.0  < > 3.3*   CHLORIDE 104 107 105 107 107 106  < > 108   CO2 26 24 24 25 23 25  < > 23   ANIONGAP 12 12 10 8 10 8  < > 12   * 138* 229* 291* 213* 130*  < > 213*   BUN 15 14 14 12 11 13  < > 7   CR 0.68 0.55 0.61 0.61 0.68 0.68  < > 0.58   GFRESTIMATED 84 >90Non  GFR Calc >90Non  GFR Calc >90Non  GFR Calc 83 85  < > >90Non  GFR Calc   GFRESTBLACK >90African American GFR Calc >90African American GFR Calc >90African American GFR Calc >90African American GFR Calc >90African American GFR Calc  >90African American GFR Calc  < > >90African American GFR Calc   CLAUDY 8.4* 8.1* 7.8* 7.8* 8.3* 8.6  < > 8.2*   MAG  --  2.3  --  2.0 2.1 2.0  < >  --    PHOS  --   --   --  2.2* 3.2 2.4*  --   --    PROTTOTAL  --   --   --   --   --   --   --  7.0   ALBUMIN  --   --   --   --   --   --   --  3.0*   BILITOTAL  --   --   --   --   --   --   --  0.6   ALKPHOS  --   --   --   --   --   --   --  72   AST  --   --   --   --   --   --   --  29   ALT  --   --   --   --   --   --   --  19   < > = values in this interval not displayed.  CBC    Recent Labs  Lab 04/09/17  0514 04/08/17 0345 04/07/17 0313 04/06/17  0723   WBC 7.6 6.8 4.4 5.8   RBC 4.08 3.88 4.19 4.48   HGB 8.4* 8.1* 8.8* 9.5*   HCT 30.5* 28.0* 31.6* 33.4*   MCV 75* 72* 75* 75*   MCH 20.6* 20.9* 21.0* 21.2*   MCHC 27.5* 28.9* 27.8* 28.4*   RDW 19.2* 19.0* 19.1* 19.0*    116* 193 216     INR    Recent Labs  Lab 04/09/17  0514 04/08/17  0345 04/07/17  0313 04/06/17  0723   INR 3.76* 3.09* 2.03* 2.02*     CRP    Recent Labs  Lab 04/06/17  0723 04/04/17  0858   CRP 64.0* 54.0*         Recent Labs  Lab 04/06/17  1055 04/06/17  1028 04/03/17  1045 04/03/17  0740 04/02/17  2327 04/02/17  2211 04/02/17  2147   CULT No growth after 2 days No growth after 2 days Light growth Normal juan >10 Squamous epithelial cells/low power field indicates oral contamination. Please recollect.Canceled, Test credited* 10,000 to 50,000 colonies/mL mixed urogenital juan No growth No growth     CXR 4/8:  IMPRESSION: Worsening perihilar opacities, left greater right.  Findings likely indicate worsening pulmonary edema.          Medications:     Current Facility-Administered Medications   Medication     acetylcysteine (MUCOMYST) 20 % nebulizer solution 2 mL     levofloxacin (LEVAQUIN) tablet 750 mg     insulin glargine (LANTUS) injection 19 Units     insulin aspart (NovoLOG) inj (RAPID ACTING)     insulin aspart (NovoLOG) inj (RAPID ACTING)     insulin aspart (NovoLOG) inj (RAPID  ACTING)     insulin aspart (NovoLOG) inj (RAPID ACTING)     insulin aspart (NovoLOG) inj (RAPID ACTING)     insulin aspart (NovoLOG) inj (RAPID ACTING)     furosemide (LASIX) tablet 40 mg     predniSONE (DELTASONE) tablet 40 mg     pantoprazole (PROTONIX) EC tablet 40 mg     glucose 40 % gel 15-30 g    Or     dextrose 50 % injection 25-50 mL    Or     glucagon injection 1 mg     dextrose 10 % 1,000 mL infusion     ipratropium - albuterol 0.5 mg/2.5 mg/3 mL (DUONEB) neb solution 3 mL     oseltamivir (TAMIFLU) capsule 75 mg     traZODone (DESYREL) half-tab 25-50 mg     spironolactone (ALDACTONE) tablet 25 mg     acetaminophen (TYLENOL) tablet 1,000 mg    Or     acetaminophen (TYLENOL) Suppository 650 mg     lactobacillus rhamnosus (GG) (CULTURELL) capsule 1 capsule     fluticasone (FLONASE) 50 MCG/ACT spray 1-2 spray     fluticasone furoate (ARNUITY ELLIPTA) 200 MCG/ACT inhalation powder 1 puff     ketoconazole (NIZORAL) 2 % cream     polyethylene glycol (MIRALAX/GLYCOLAX) Packet 17 g     montelukast (SINGULAIR) tablet 10 mg     PARoxetine (PAXIL) tablet 10 mg     Warfarin Therapy Reminder (Check START DATE - warfarin may be starting in the FUTURE)     naloxone (NARCAN) injection 0.1-0.4 mg     lidocaine 1 % 1 mL     lidocaine (LMX4) kit     sodium chloride (PF) 0.9% PF flush 3 mL     sodium chloride (PF) 0.9% PF flush 3 mL     Patient is already receiving anticoagulation with heparin, enoxaparin (LOVENOX), warfarin (COUMADIN)  or other anticoagulant medication     albuterol neb solution 2.5 mg     guaiFENesin-dextromethorphan (ROBITUSSIN DM) 100-10 MG/5ML syrup 10 mL     potassium chloride SA (K-DUR/KLOR-CON M) CR tablet 20-40 mEq     potassium chloride (KLOR-CON) Packet 20-40 mEq     potassium chloride 10 mEq in 100 mL intermittent infusion     potassium chloride 10 mEq in 100 mL intermittent infusion with 10 mg lidocaine     potassium chloride 20 mEq in 50 mL intermittent infusion     metoprolol (TOPROL-XL) 24 hr  tablet 100 mg       Caring Physician: Beatriz Painting MD  [facility], [facility]  Pager Contact: see Physician sticky note

## 2017-04-09 NOTE — PROGRESS NOTES
Patient transferred to  via wheelchair off monitor.  Belongings packed by friends. Of importance, patient had cell phone, robe, and glasses.

## 2017-04-10 ENCOUNTER — APPOINTMENT (OUTPATIENT)
Dept: PHYSICAL THERAPY | Facility: CLINIC | Age: 77
DRG: 193 | End: 2017-04-10
Payer: MEDICARE

## 2017-04-10 ENCOUNTER — TELEPHONE (OUTPATIENT)
Dept: FAMILY MEDICINE | Facility: CLINIC | Age: 77
End: 2017-04-10

## 2017-04-10 LAB
ANION GAP SERPL CALCULATED.3IONS-SCNC: 7 MMOL/L (ref 3–14)
BASOPHILS # BLD AUTO: 0 10E9/L (ref 0–0.2)
BASOPHILS NFR BLD AUTO: 0 %
BUN SERPL-MCNC: 14 MG/DL (ref 7–30)
CALCIUM SERPL-MCNC: 8.8 MG/DL (ref 8.5–10.1)
CHLORIDE SERPL-SCNC: 105 MMOL/L (ref 94–109)
CO2 SERPL-SCNC: 27 MMOL/L (ref 20–32)
CREAT SERPL-MCNC: 0.65 MG/DL (ref 0.52–1.04)
DIFFERENTIAL METHOD BLD: ABNORMAL
EOSINOPHIL # BLD AUTO: 0 10E9/L (ref 0–0.7)
EOSINOPHIL NFR BLD AUTO: 0.1 %
ERYTHROCYTE [DISTWIDTH] IN BLOOD BY AUTOMATED COUNT: 19.2 % (ref 10–15)
GFR SERPL CREATININE-BSD FRML MDRD: 88 ML/MIN/1.7M2
GLUCOSE BLDC GLUCOMTR-MCNC: 115 MG/DL (ref 70–99)
GLUCOSE BLDC GLUCOMTR-MCNC: 174 MG/DL (ref 70–99)
GLUCOSE BLDC GLUCOMTR-MCNC: 194 MG/DL (ref 70–99)
GLUCOSE BLDC GLUCOMTR-MCNC: 249 MG/DL (ref 70–99)
GLUCOSE SERPL-MCNC: 133 MG/DL (ref 70–99)
HCT VFR BLD AUTO: 32.8 % (ref 35–47)
HGB BLD-MCNC: 9.2 G/DL (ref 11.7–15.7)
IMM GRANULOCYTES # BLD: 0 10E9/L (ref 0–0.4)
IMM GRANULOCYTES NFR BLD: 0.3 %
INR PPP: 2.47 (ref 0.86–1.14)
LYMPHOCYTES # BLD AUTO: 1.5 10E9/L (ref 0.8–5.3)
LYMPHOCYTES NFR BLD AUTO: 19.2 %
MAGNESIUM SERPL-MCNC: 2.2 MG/DL (ref 1.6–2.3)
MCH RBC QN AUTO: 20.8 PG (ref 26.5–33)
MCHC RBC AUTO-ENTMCNC: 28 G/DL (ref 31.5–36.5)
MCV RBC AUTO: 74 FL (ref 78–100)
MONOCYTES # BLD AUTO: 0.6 10E9/L (ref 0–1.3)
MONOCYTES NFR BLD AUTO: 7.1 %
NEUTROPHILS # BLD AUTO: 5.8 10E9/L (ref 1.6–8.3)
NEUTROPHILS NFR BLD AUTO: 73.3 %
NRBC # BLD AUTO: 0 10*3/UL
NRBC BLD AUTO-RTO: 0 /100
PHOSPHATE SERPL-MCNC: 2 MG/DL (ref 2.5–4.5)
PLATELET # BLD AUTO: 287 10E9/L (ref 150–450)
POTASSIUM SERPL-SCNC: 3.5 MMOL/L (ref 3.4–5.3)
RBC # BLD AUTO: 4.42 10E12/L (ref 3.8–5.2)
SODIUM SERPL-SCNC: 139 MMOL/L (ref 133–144)
WBC # BLD AUTO: 7.9 10E9/L (ref 4–11)

## 2017-04-10 PROCEDURE — 25000132 ZZH RX MED GY IP 250 OP 250 PS 637: Mod: GY | Performed by: FAMILY MEDICINE

## 2017-04-10 PROCEDURE — 25000125 ZZHC RX 250: Performed by: HOSPITALIST

## 2017-04-10 PROCEDURE — A9270 NON-COVERED ITEM OR SERVICE: HCPCS | Mod: GY | Performed by: STUDENT IN AN ORGANIZED HEALTH CARE EDUCATION/TRAINING PROGRAM

## 2017-04-10 PROCEDURE — 25000132 ZZH RX MED GY IP 250 OP 250 PS 637: Mod: GY | Performed by: STUDENT IN AN ORGANIZED HEALTH CARE EDUCATION/TRAINING PROGRAM

## 2017-04-10 PROCEDURE — 40000193 ZZH STATISTIC PT WARD VISIT

## 2017-04-10 PROCEDURE — 94640 AIRWAY INHALATION TREATMENT: CPT

## 2017-04-10 PROCEDURE — 36415 COLL VENOUS BLD VENIPUNCTURE: CPT | Performed by: HOSPITALIST

## 2017-04-10 PROCEDURE — 97116 GAIT TRAINING THERAPY: CPT | Mod: GP

## 2017-04-10 PROCEDURE — 25000125 ZZHC RX 250: Performed by: FAMILY MEDICINE

## 2017-04-10 PROCEDURE — 00000146 ZZHCL STATISTIC GLUCOSE BY METER IP

## 2017-04-10 PROCEDURE — A9270 NON-COVERED ITEM OR SERVICE: HCPCS | Mod: GY | Performed by: HOSPITALIST

## 2017-04-10 PROCEDURE — 12000001 ZZH R&B MED SURG/OB UMMC

## 2017-04-10 PROCEDURE — 80048 BASIC METABOLIC PNL TOTAL CA: CPT | Performed by: HOSPITALIST

## 2017-04-10 PROCEDURE — 85610 PROTHROMBIN TIME: CPT | Performed by: HOSPITALIST

## 2017-04-10 PROCEDURE — 40000275 ZZH STATISTIC RCP TIME EA 10 MIN

## 2017-04-10 PROCEDURE — A9270 NON-COVERED ITEM OR SERVICE: HCPCS | Mod: GY | Performed by: FAMILY MEDICINE

## 2017-04-10 PROCEDURE — 97530 THERAPEUTIC ACTIVITIES: CPT | Mod: GP

## 2017-04-10 PROCEDURE — 84100 ASSAY OF PHOSPHORUS: CPT | Performed by: HOSPITALIST

## 2017-04-10 PROCEDURE — 85025 COMPLETE CBC W/AUTO DIFF WBC: CPT | Performed by: HOSPITALIST

## 2017-04-10 PROCEDURE — 94640 AIRWAY INHALATION TREATMENT: CPT | Mod: 76

## 2017-04-10 PROCEDURE — 25000132 ZZH RX MED GY IP 250 OP 250 PS 637: Mod: GY | Performed by: HOSPITALIST

## 2017-04-10 PROCEDURE — 83735 ASSAY OF MAGNESIUM: CPT | Performed by: HOSPITALIST

## 2017-04-10 RX ORDER — POTASSIUM CHLORIDE 7.45 MG/ML
10 INJECTION INTRAVENOUS
Status: DISCONTINUED | OUTPATIENT
Start: 2017-04-10 | End: 2017-04-11 | Stop reason: HOSPADM

## 2017-04-10 RX ORDER — WARFARIN SODIUM 6 MG/1
6 TABLET ORAL
Status: COMPLETED | OUTPATIENT
Start: 2017-04-10 | End: 2017-04-10

## 2017-04-10 RX ORDER — POTASSIUM CL/LIDO/0.9 % NACL 10MEQ/0.1L
10 INTRAVENOUS SOLUTION, PIGGYBACK (ML) INTRAVENOUS
Status: DISCONTINUED | OUTPATIENT
Start: 2017-04-10 | End: 2017-04-11 | Stop reason: HOSPADM

## 2017-04-10 RX ORDER — MAGNESIUM SULFATE HEPTAHYDRATE 40 MG/ML
4 INJECTION, SOLUTION INTRAVENOUS EVERY 4 HOURS PRN
Status: DISCONTINUED | OUTPATIENT
Start: 2017-04-10 | End: 2017-04-11 | Stop reason: HOSPADM

## 2017-04-10 RX ORDER — POTASSIUM CHLORIDE 750 MG/1
20-40 TABLET, EXTENDED RELEASE ORAL
Status: DISCONTINUED | OUTPATIENT
Start: 2017-04-10 | End: 2017-04-11 | Stop reason: HOSPADM

## 2017-04-10 RX ORDER — POTASSIUM CHLORIDE 29.8 MG/ML
20 INJECTION INTRAVENOUS
Status: DISCONTINUED | OUTPATIENT
Start: 2017-04-10 | End: 2017-04-11 | Stop reason: HOSPADM

## 2017-04-10 RX ORDER — POTASSIUM CHLORIDE 1.5 G/1.58G
20-40 POWDER, FOR SOLUTION ORAL
Status: DISCONTINUED | OUTPATIENT
Start: 2017-04-10 | End: 2017-04-11 | Stop reason: HOSPADM

## 2017-04-10 RX ADMIN — WARFARIN SODIUM 6 MG: 6 TABLET ORAL at 18:15

## 2017-04-10 RX ADMIN — LISINOPRIL 2.5 MG: 2.5 TABLET ORAL at 09:05

## 2017-04-10 RX ADMIN — IPRATROPIUM BROMIDE AND ALBUTEROL SULFATE 3 ML: .5; 3 SOLUTION RESPIRATORY (INHALATION) at 08:18

## 2017-04-10 RX ADMIN — Medication 1 CAPSULE: at 20:36

## 2017-04-10 RX ADMIN — SPIRONOLACTONE 25 MG: 25 TABLET ORAL at 09:03

## 2017-04-10 RX ADMIN — PREDNISONE 30 MG: 20 TABLET ORAL at 09:05

## 2017-04-10 RX ADMIN — PANTOPRAZOLE SODIUM 40 MG: 20 TABLET, DELAYED RELEASE ORAL at 09:03

## 2017-04-10 RX ADMIN — Medication 1 CAPSULE: at 17:04

## 2017-04-10 RX ADMIN — ACETYLCYSTEINE 2 ML: 200 INHALANT RESPIRATORY (INHALATION) at 08:18

## 2017-04-10 RX ADMIN — GUAIFENESIN AND DEXTROMETHORPHAN 10 ML: 100; 10 SYRUP ORAL at 12:12

## 2017-04-10 RX ADMIN — METOPROLOL SUCCINATE 100 MG: 100 TABLET, FILM COATED, EXTENDED RELEASE ORAL at 09:07

## 2017-04-10 RX ADMIN — FUROSEMIDE 40 MG: 40 TABLET ORAL at 09:05

## 2017-04-10 RX ADMIN — ACETYLCYSTEINE 2 ML: 200 INHALANT RESPIRATORY (INHALATION) at 19:44

## 2017-04-10 RX ADMIN — PAROXETINE HYDROCHLORIDE 10 MG: 10 TABLET, FILM COATED ORAL at 20:36

## 2017-04-10 RX ADMIN — Medication 1 CAPSULE: at 11:15

## 2017-04-10 RX ADMIN — ATORVASTATIN CALCIUM 40 MG: 40 TABLET, FILM COATED ORAL at 09:05

## 2017-04-10 RX ADMIN — FLUTICASONE FUROATE 1 PUFF: 200 POWDER RESPIRATORY (INHALATION) at 09:06

## 2017-04-10 RX ADMIN — KETOCONAZOLE: 20 CREAM TOPICAL at 20:35

## 2017-04-10 RX ADMIN — FUROSEMIDE 40 MG: 40 TABLET ORAL at 17:04

## 2017-04-10 RX ADMIN — POTASSIUM CHLORIDE 20 MEQ: 750 TABLET, EXTENDED RELEASE ORAL at 09:05

## 2017-04-10 RX ADMIN — POTASSIUM PHOSPHATE, MONOBASIC AND POTASSIUM PHOSPHATE, DIBASIC 15 MMOL: 224; 236 INJECTION, SOLUTION INTRAVENOUS at 20:32

## 2017-04-10 RX ADMIN — METFORMIN HYDROCHLORIDE 1000 MG: 500 TABLET, EXTENDED RELEASE ORAL at 18:15

## 2017-04-10 RX ADMIN — PAROXETINE HYDROCHLORIDE 10 MG: 10 TABLET, FILM COATED ORAL at 00:02

## 2017-04-10 RX ADMIN — LEVOFLOXACIN 750 MG: 750 TABLET, FILM COATED ORAL at 09:03

## 2017-04-10 RX ADMIN — MONTELUKAST SODIUM 10 MG: 10 TABLET, FILM COATED ORAL at 20:36

## 2017-04-10 RX ADMIN — IPRATROPIUM BROMIDE AND ALBUTEROL SULFATE 3 ML: .5; 3 SOLUTION RESPIRATORY (INHALATION) at 19:44

## 2017-04-10 ASSESSMENT — ENCOUNTER SYMPTOMS
CONSTIPATION: 0
HEADACHES: 0
FEVER: 0
CONFUSION: 0
NAUSEA: 0
DIZZINESS: 0
CHILLS: 0
DIARRHEA: 0
ABDOMINAL PAIN: 0
NERVOUS/ANXIOUS: 0
COUGH: 1
LIGHT-HEADEDNESS: 0
SHORTNESS OF BREATH: 1
VOMITING: 0

## 2017-04-10 NOTE — CONSULTS
Diabetes Education    Received consult request.to see this 76 year old female for diabetes education.  Patient was seen by diabetes education on 4/5/17, but was too ill at that time and was subsequently transferred to ICU due to respiratory distress.    Patient now transferred to .  Patient on a 2 gram sodium diet, up in chair, visiting with friend.  Patient receptive to starting diabetes education today.  Her RN has been working with her today on the mechanics of the insulin pen.      Met with patient and friend/roommate Yvette.  Discussed insulin plan.  Patient quite overwhelmed with details of determining NovoLog dose.  Discussed option of a regimen with set meal doses of NovoLog rather than carbohydrate counting and correction scale and she is receptive to this option.    Patient requested that we do just a small amount of education today, then continue tomorrow.  Today we focused on insulin administration.  She practiced, needed some cues.  She would like to do her dinnertime injection of insulin, with her nurse supervising.  Left home pen needles at bedside.    Will meet with patient and Yvette tomorrow at 10 am to continue education.  Meghan Stone MS RN CDE CDTC  [phone]

## 2017-04-10 NOTE — PROGRESS NOTES
"Social Work: Assessment with Discharge Plan    Patient Name:  Betty Tee  :  1940  Age:  76 year old  MRN:  [id number]  Risk/Complexity Score:  Filed Complexity Screen Score: 9  Completed assessment with:  Nghia/POA, chart review, BRAD sher PT Archana HINOJOSA @ 9:45 am 4/10 with noted concerns re: Dc to home vs tcu  Presenting Information   Reason for Referral:  Discharge plan  Date of Intake:  April 10, 2017  Referral Source:  Family  Decision Maker:  Self  Alternate Decision Maker:  JACK Moss  Health Care Directive:  Copy in Chart  Living Situation:  House  Previous Functional Status:  Independent  Patient and family understanding of hospitalization:  Patient and POA endorse a slow gradual decline in health and functioning over the last few weeks. They feel strongly patient should go to rehab prior to returning home.  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\"  Adjustment to Illness:  Patient is very anxious to DC home. She and nghia state she is uncomfortable managing her diabetes and needs time to learn how to control it with supervision. They also state patient lives in a very small house that she cannot use her walker in-she does not use at baseline, but is currently requiring. They also report patient is home alone during the days, and cannot do the stairs in the home. They both feel strongly patient could not safely be home after DC, they continue with the house has only one bathroom in a tight area and patient cannot navigate without walker, but walker will not fit. Feel strongly patient needs TCU at dc.    Physical Health  Reason for Admission:    1. Pneumonia due to infectious organism, unspecified laterality, unspecified part of lung    2. Atrial fibrillation with rapid ventricular response (H)    3. Fever, unspecified    4. Other pneumonia, unspecified organism    5. Atrial fibrillation, unspecified type (H)    6. Essential " "hypertension, malignant    7. Personal history of venous thrombosis and embolism    8. Long term (current) use of anticoagulants      Services Needed/Recommended:  TCU    Mental Health/Chemical Dependency  Diagnosis:  na  Support/Services in Place:    Services Needed/Recommended:      Support System  Significant relationship at present time:  Nghia SANTIAGO  Family of origin is available for support:  yes  Other support available:  Patient belongs to the [employer], patient lives with Yvette Chaves.  Gaps in support system:  none  Patient is caregiver to:  None     Provider Information   Primary Care Physician:  Ilene Tristan   [phone]   Clinic:  [facility] [address] / [place]*      :  chau    Financial   Income Source:  Social security/[employer] programs/nun  Financial Concerns:  none  Insurance:    Payor/Plan Subscriber Name Rel Member # Group #   MEDICARE - MEDICARE JEAN KENNEDY  [id number]       ATTN CLAIMS, [address]   BCBS - [place] [place] JEAN KENNEDY  [id number]      [address] [address]       Discharge Plan   Patient and family discharge goal:  Strong preference for TCU, \"Afraid to go home from here\"  Provided education on discharge plan:  YES  Patient agreeable to discharge plan:  Requested SW speak with PT and discuss recs  A list of Medicare Certified Facilities was provided to the patient and/or family to encourage patient choice. Patient's choices for facility are:  [facility], [facility], [facility]  Will NH provide Skilled rehabilitation or complex medical:  YES  General information regarding anticipated insurance coverage and possible out of pocket cost was discussed. Patient and patient's family are aware patient may incur the cost of transportation to the facility, pending insurance payment: YES  Barriers to discharge:  recs for TCU    Discharge Recommendations   Anticipated Disposition:  TBD, pending PT " today  Transportation Needs:  Family:  POA Nghia- or medical   Name of Transportation Company and Phone:  tbd    Additional comments   SW paged PT informing of patient/family concerns.    Alta KAUFMAN, MSW  5B  (Medical/Surgical)  Phone: [phone]  Pager: [phone]

## 2017-04-10 NOTE — PLAN OF CARE
Problem: Goal Outcome Summary  Goal: Goal Outcome Summary  Outcome: No Change  A&Ox4, VSS on RA. SBA to bathroom d/t increased SOB with activity. Denies pain and nausea.  at 0200 check. Up to bathroom x1 overnight. Pt slept through the night. 2 gram NA diet. Will continue to monitor and follow POC.

## 2017-04-10 NOTE — PLAN OF CARE
Problem: Goal Outcome Summary  Goal: Goal Outcome Summary  Pt arrived on unit from 4E via w/c. Alert and oriented X 4, VSS, O2 sats 96% on RA. Pt was able to ambulate in room independently but appeared SOB with activities and O2 sats dropped to 88% while up moving around. Pt denied any pain but verbalized feeling overwhelmed with this hospitalization.   post meal. Pt had supper on 4A and did not have insulin coverage since bg was 125 prior to meal. Will recheck BG at HS. Pt is currently sitting at edge of bed working on balancing checkbook so POA can take checkbook home. Will revisit with pt when family members leave room.

## 2017-04-10 NOTE — PROGRESS NOTES
[facility] daily [facility]    Date of admission: 4/2/2017  Date of service: 4/10/2017.    Updates:  - Diabetes education today           Assessment and Plan:      Betty Tee is a 76 year old who was admitted AMS and  for evaluation of persistent cough, dizziness and found to be positive for influenza. She developed an  acute hypoxic respiratory failure 4/6 that is due to pulmonary congestion.     Patient Active Problem List   Diagnosis     Temporomandibular joint disorder     Diverticulitis of colon     Disorder of bone and cartilage     Osteoarthritis     Alcohol abuse, in remission     Restless legs syndrome (RLS)     Major depressive disorder, recurrent episode, moderate     Essential hypertension with goal blood pressure less than 140/90     CARDIOVASCULAR SCREENING; LDL GOAL LESS THAN 130     Sciatica     Advanced directives, counseling/discussion     Colouterine fistula     Atrial fibrillation with controlled ventricular response (H)     Left ventricular hypertrophy     Health Care Home     Insomnia     Joint pains     Allergic reaction caused by a drug - likely plaquenil     Breast fibroadenoma     DVT (deep venous thrombosis) (H)     Fatty liver disease, nonalcoholic     Rib pain     Neck mass     Gastroesophageal reflux disease without esophagitis     Enlarged lymph node     Sjogren's syndrome (H)     ANGELICA (obstructive sleep apnea)     ILD (interstitial lung disease) (H)     Lower GI bleed     Acute and chronic respiratory failure with hypoxia (H)     Acute edema of lung (H)     (HFpEF) heart failure with preserved ejection fraction (H)     Type 2 diabetes mellitus without complication, without long-term current use of insulin (H)     CAP (community acquired pneumonia)     HCAP (healthcare-associated pneumonia)     Influenza B     Heart failure, chronic, with acute decompensation (H)     Type 2 diabetes mellitus with hyperglycemia (H)         ## Acute hypoxic  respiratory failure, pulmonary congestion :  Improving  Multifactorial from acute decompensation of her HFpEF in setting of acute respiratory illness and holding her diuretics in context of ILD.  Her oxygen requirement has decreased after diuresis . Currently not needing oxygen at rest but needs oxygen support when walking (uses 4L at home with exertion/walking).    Plan:  - Will continue home Lasix 40 gm BID and Spironolactone 25 mg daily   - Add oral potassium (20 meq daily)  -Continue Metoprolol   -Daily weights  -Input and Output   -IV Lasix as needed     ## HFpEF, with recent decompensation, improved   Plan:  - as above   -Low salt diet     ##Possible Hospital Acquired Pneumonia   ##Influenza:  ##ILD   ##Reactive Airway   Patient on last day of Tamiflu.  At risk for Staph Pneumonia but negative nasal  MRSA . No red flags for Staph pneumonia. Antibiotics was broadened during acute respiratory failure. Her  blood culture has been negative and pro calcitonin level is not suggestive of a bacterial infection .  However  her xray today showed more haziness on the left lung field that superimposed bacterial infection can not be ruled out.     Plan:   - IV antibiotics (Azithromycin & Ceftriaxone followed by Vanc & Zosyn) 4/3-4/8, Levofloxacin started 4/8.  Plan for 7-14 days for all antibiotics  - Follow up blood cultures   - Bipap at night  If tolerated  - On baseline prednisone 10 mg daily , we have increased this to 40 mg during decompensation . - Started weaning yesterday (currently at 30 mg), go down by 10 mg every 3 days until at 10 mg daily.   - Continue fluticasone daily  - Continue duonebs q 4 hours  - Continue albuterol nebs q 2 hours prn  - Lactobacillus  - Robitussin for cough     ## Dizziness/AMS- resolved  Likely multifactorial : Hypoxia and acute respiratory illness. Negative ACS and stroke work up.   Plan :   Observe, address underlying etiology      ## A Fib ,rate controlled   INR goal 2.0-3.0, INR  currently at goal  Plan:   -continue Metoprolol   - Hold coumadin today    ## HTN , within acceptable ranges     Plan:   - Cont metoprolol 100 mg daily.  - Continue spironolactone 25 mg daily    ## Diabetes Mellitus- uncontrolled  Hgb A1c 14.3 (4/2017).  Was started on an Insulin drip 4/7 for uncontrolled hyperglycemia secondary to recent increase steroid dose. Drip discontinued 4/8.  Blood sugars still not stable    Plan:   - Increase Lantus from 19 to 23 units  - Increase Prandial Coverage from 1 unit /13 gram CHO to 1 unit/9 grams CHO  - Continue high ISS  - Restart Metformin 1000 mg daily  - Diabetes education   - Start low dose ACE inhibitor (lisinopril 2.5 mg daily)    ## ASCVD Risk:  ASCVD risk calculated.  10 year risk is 42.6%.  - Start high intensity statin (atorvastatin 40 mg daily)    ##ANGELICA  Plan:   -Bipap at night     ##Sjorgren Syndrome, Stable   Plan:   -Will observe     ## Physical Deconditioning  - PT/OT    Daily cares -   F:none  E:stable  N:regular diet  Lines:PIV  Activity:-Up as tolerated  CODE:Full Code  Prophylaxis:On warfarin  PCP communication:  - Ilene Tristan     Dispo: Expected discharge date: tomorrow             Interval History:   Betty Tee has no complaints this morning.      Overall, she states her breathing has improved and she is feeling better.  She ambulated yesterday with oxygen (uses oxygen with activity at home).                Review of Systems:   Review of Systems   Constitutional: Negative for chills and fever.   HENT: Negative for congestion.    Respiratory: Positive for cough and shortness of breath (with ambulation).    Cardiovascular: Negative for chest pain.   Gastrointestinal: Negative for abdominal pain, constipation, diarrhea, nausea and vomiting.   Neurological: Negative for dizziness, light-headedness and headaches.   Psychiatric/Behavioral: Negative for confusion. The patient is not nervous/anxious.             Physical Exam (Resident /  Clinician):   Vitals were reviewed  Temp: 98  F (36.7  C) Temp src: Oral BP: 145/80   Heart Rate: 97 Resp: 20 SpO2: 95 % O2 Device: None (Room air) Oxygen Delivery: Bipap 10/5, 40% FiO2    Physical Exam   Constitutional: She is oriented to person, place, and time. She appears well-developed and well-nourished. No distress.   Sitting in bed, not in distress   Neck: Normal range of motion. No JVD present.   Cardiovascular: Normal rate and intact distal pulses.    No murmur heard.  Pulmonary/Chest: Effort normal. No stridor. No respiratory distress. She has no wheezes.   Crackles at bases bilaterally   Abdominal: Soft. Bowel sounds are normal. She exhibits no distension. There is no tenderness.   Musculoskeletal: Normal range of motion. She exhibits edema (trace edema bilaterally). She exhibits no tenderness.   Neurological: She is alert and oriented to person, place, and time.   Skin: Skin is warm and dry.   Psychiatric: She has a normal mood and affect. Her behavior is normal. Judgment and thought content normal.   Vitals reviewed.          Data:   ROUTINE LABS (Last four results)  CMP    Recent Labs  Lab 04/10/17  0850 04/09/17  0514 04/08/17  0345 04/07/17  0313 04/06/17  1944 04/06/17  0723 04/05/17  0726    142 143 140 140 140 140   POTASSIUM 3.5 3.5 4.0 4.1 3.9 4.0 4.0   CHLORIDE 105 104 107 105 107 107 106   CO2 27 26 24 24 25 23 25   ANIONGAP 7 12 12 10 8 10 8   * 205* 138* 229* 291* 213* 130*   BUN 14 15 14 14 12 11 13   CR 0.65 0.68 0.55 0.61 0.61 0.68 0.68   GFRESTIMATED 88 84 >90Non  GFR Calc >90Non  GFR Calc >90Non  GFR Calc 83 85   GFRESTBLACK >90African American GFR Calc >90African American GFR Calc >90African American GFR Calc >90African American GFR Calc >90African American GFR Calc >90African American GFR Calc >90African American GFR Calc   CLAUDY 8.8 8.4* 8.1* 7.8* 7.8* 8.3* 8.6   MAG 2.2  --  2.3  --  2.0 2.1 2.0   PHOS 2.0*  --   --   --   2.2* 3.2 2.4*     CBC    Recent Labs  Lab 04/10/17  0850 04/09/17  0514 04/08/17  0345 04/07/17  0313   WBC 7.9 7.6 6.8 4.4   RBC 4.42 4.08 3.88 4.19   HGB 9.2* 8.4* 8.1* 8.8*   HCT 32.8* 30.5* 28.0* 31.6*   MCV 74* 75* 72* 75*   MCH 20.8* 20.6* 20.9* 21.0*   MCHC 28.0* 27.5* 28.9* 27.8*   RDW 19.2* 19.2* 19.0* 19.1*    233 116* 193     INR    Recent Labs  Lab 04/10/17  0850 04/09/17  0514 04/08/17  0345 04/07/17  0313   INR 2.47* 3.76* 3.09* 2.03*     CRP    Recent Labs  Lab 04/06/17  0723 04/04/17  0858   CRP 64.0* 54.0*         Recent Labs  Lab 04/06/17  1055 04/06/17  1028 04/03/17  1045   CULT No growth after 4 days No growth after 4 days Light growth Normal juan     CXR 4/8:  IMPRESSION: Worsening perihilar opacities, left greater right.  Findings likely indicate worsening pulmonary edema.          Medications:     Current Facility-Administered Medications   Medication     predniSONE (DELTASONE) tablet 30 mg     insulin glargine (LANTUS) injection 23 Units     lisinopril (PRINIVIL/Zestril) tablet 2.5 mg     warfarin-No DOSE today     metFORMIN (GLUCOPHAGE-XR) 24 hr tablet 1,000 mg     atorvastatin (LIPITOR) tablet 40 mg     potassium chloride SA (K-DUR/KLOR-CON M) CR tablet 20 mEq     acetylcysteine (MUCOMYST) 20 % nebulizer solution 2 mL     ipratropium - albuterol 0.5 mg/2.5 mg/3 mL (DUONEB) neb solution 3 mL     levofloxacin (LEVAQUIN) tablet 750 mg     insulin aspart (NovoLOG) inj (RAPID ACTING)     insulin aspart (NovoLOG) inj (RAPID ACTING)     insulin aspart (NovoLOG) inj (RAPID ACTING)     insulin aspart (NovoLOG) inj (RAPID ACTING)     insulin aspart (NovoLOG) inj (RAPID ACTING)     insulin aspart (NovoLOG) inj (RAPID ACTING)     furosemide (LASIX) tablet 40 mg     pantoprazole (PROTONIX) EC tablet 40 mg     glucose 40 % gel 15-30 g    Or     dextrose 50 % injection 25-50 mL    Or     glucagon injection 1 mg     dextrose 10 % 1,000 mL infusion     traZODone (DESYREL) half-tab 25-50 mg      spironolactone (ALDACTONE) tablet 25 mg     acetaminophen (TYLENOL) tablet 1,000 mg    Or     acetaminophen (TYLENOL) Suppository 650 mg     lactobacillus rhamnosus (GG) (CULTURELL) capsule 1 capsule     fluticasone (FLONASE) 50 MCG/ACT spray 1-2 spray     fluticasone furoate (ARNUITY ELLIPTA) 200 MCG/ACT inhalation powder 1 puff     ketoconazole (NIZORAL) 2 % cream     polyethylene glycol (MIRALAX/GLYCOLAX) Packet 17 g     montelukast (SINGULAIR) tablet 10 mg     PARoxetine (PAXIL) tablet 10 mg     Warfarin Therapy Reminder (Check START DATE - warfarin may be starting in the FUTURE)     naloxone (NARCAN) injection 0.1-0.4 mg     lidocaine 1 % 1 mL     lidocaine (LMX4) kit     sodium chloride (PF) 0.9% PF flush 3 mL     sodium chloride (PF) 0.9% PF flush 3 mL     Patient is already receiving anticoagulation with heparin, enoxaparin (LOVENOX), warfarin (COUMADIN)  or other anticoagulant medication     albuterol neb solution 2.5 mg     guaiFENesin-dextromethorphan (ROBITUSSIN DM) 100-10 MG/5ML syrup 10 mL     potassium chloride SA (K-DUR/KLOR-CON M) CR tablet 20-40 mEq     potassium chloride (KLOR-CON) Packet 20-40 mEq     potassium chloride 10 mEq in 100 mL intermittent infusion     potassium chloride 10 mEq in 100 mL intermittent infusion with 10 mg lidocaine     potassium chloride 20 mEq in 50 mL intermittent infusion     metoprolol (TOPROL-XL) 24 hr tablet 100 mg       Caring Physician: Beatriz Painting MD  [facility], [place]  Pager Contact: see Physician sticky note

## 2017-04-10 NOTE — TELEPHONE ENCOUNTER
CW  Received [employer] message regarding cancellation.  Sending as any FYI.  Thanks, Sophia Hemphill    FYI-  See Message below, this was sent as a Cancelation of her appt's . Patient was Hospitalized.           Tea Ratliff          ----- Message -----        From: Betty Kennedy        Sent: 4/10/2017   8:35 AM          To: Up Reception     Subject: Appointment canceled                                   Appointment canceled for BETTY KENNEDY ([id number])     Visit Type: LONG     Date        Time      Length    Provider                  Department     4/12/2017    1:30 PM  45 mins.  Ilene Tristan MD [facility]          Reason for Cancellation: Other          Patient Comments: Betty Gaviria has been hospitalized since last Sunday with Influenza B and pneumonia.  They are also treating her for significantly elevated blood sugars.  She was in Intensive Care from Thursday through yesterday.  Nghia Carlson

## 2017-04-10 NOTE — TELEPHONE ENCOUNTER
Noted- appreciate the FYI.    Reviewed hospital notes.   Looks like she may be discharged to TCU from hospital soon (pt does not feel safe going home- difficult to navigate bathroom there with walker, and overwhelmed with DM txts).  Am concerned about her eventual transition home as well- if that occurs.  Will want to see her for a 45 minute hospitalization f/u if/when she is heading home.  CW

## 2017-04-10 NOTE — PLAN OF CARE
Problem: Goal Outcome Summary  Goal: Goal Outcome Summary  PT-5A: Amb ~ 600' without AD + CGA, needing intermittent HHA on carts and handrails in hallways and standing rest breaks about every 100' secondary to SOB although SpO2 maintained at 98% throughout amb.     PT recommends TCU at discharge.  Discussed with pt who wants the evening to consider discharge recommendation.

## 2017-04-11 ENCOUNTER — TRANSFERRED RECORDS (OUTPATIENT)
Dept: HEALTH INFORMATION MANAGEMENT | Facility: CLINIC | Age: 77
End: 2017-04-11

## 2017-04-11 ENCOUNTER — APPOINTMENT (OUTPATIENT)
Dept: PHYSICAL THERAPY | Facility: CLINIC | Age: 77
DRG: 193 | End: 2017-04-11
Payer: MEDICARE

## 2017-04-11 VITALS
HEART RATE: 82 BPM | TEMPERATURE: 98.5 F | RESPIRATION RATE: 16 BRPM | DIASTOLIC BLOOD PRESSURE: 74 MMHG | SYSTOLIC BLOOD PRESSURE: 127 MMHG | OXYGEN SATURATION: 90 % | WEIGHT: 205.9 LBS | BODY MASS INDEX: 32.51 KG/M2

## 2017-04-11 LAB
ANION GAP SERPL CALCULATED.3IONS-SCNC: 10 MMOL/L (ref 3–14)
BASOPHILS # BLD AUTO: 0 10E9/L (ref 0–0.2)
BASOPHILS NFR BLD AUTO: 0 %
BUN SERPL-MCNC: 14 MG/DL (ref 7–30)
CALCIUM SERPL-MCNC: 8.4 MG/DL (ref 8.5–10.1)
CHLORIDE SERPL-SCNC: 106 MMOL/L (ref 94–109)
CO2 SERPL-SCNC: 27 MMOL/L (ref 20–32)
CREAT SERPL-MCNC: 0.69 MG/DL (ref 0.52–1.04)
DIFFERENTIAL METHOD BLD: ABNORMAL
EOSINOPHIL # BLD AUTO: 0 10E9/L (ref 0–0.7)
EOSINOPHIL NFR BLD AUTO: 0.4 %
ERYTHROCYTE [DISTWIDTH] IN BLOOD BY AUTOMATED COUNT: 19.2 % (ref 10–15)
GFR SERPL CREATININE-BSD FRML MDRD: 82 ML/MIN/1.7M2
GLUCOSE BLDC GLUCOMTR-MCNC: 108 MG/DL (ref 70–99)
GLUCOSE BLDC GLUCOMTR-MCNC: 124 MG/DL (ref 70–99)
GLUCOSE BLDC GLUCOMTR-MCNC: 216 MG/DL (ref 70–99)
GLUCOSE BLDC GLUCOMTR-MCNC: 98 MG/DL (ref 70–99)
GLUCOSE SERPL-MCNC: 91 MG/DL (ref 70–99)
HCT VFR BLD AUTO: 33.6 % (ref 35–47)
HGB BLD-MCNC: 9.6 G/DL (ref 11.7–15.7)
IMM GRANULOCYTES # BLD: 0 10E9/L (ref 0–0.4)
IMM GRANULOCYTES NFR BLD: 0.3 %
INR PPP: 2.05 (ref 0.86–1.14)
LYMPHOCYTES # BLD AUTO: 1.9 10E9/L (ref 0.8–5.3)
LYMPHOCYTES NFR BLD AUTO: 21.4 %
MAGNESIUM SERPL-MCNC: 2 MG/DL (ref 1.6–2.3)
MCH RBC QN AUTO: 20.9 PG (ref 26.5–33)
MCHC RBC AUTO-ENTMCNC: 28.6 G/DL (ref 31.5–36.5)
MCV RBC AUTO: 73 FL (ref 78–100)
MONOCYTES # BLD AUTO: 0.7 10E9/L (ref 0–1.3)
MONOCYTES NFR BLD AUTO: 7.2 %
NEUTROPHILS # BLD AUTO: 6.4 10E9/L (ref 1.6–8.3)
NEUTROPHILS NFR BLD AUTO: 70.7 %
NRBC # BLD AUTO: 0 10*3/UL
NRBC BLD AUTO-RTO: 0 /100
PHOSPHATE SERPL-MCNC: 3.4 MG/DL (ref 2.5–4.5)
PLATELET # BLD AUTO: 269 10E9/L (ref 150–450)
POTASSIUM SERPL-SCNC: 3.6 MMOL/L (ref 3.4–5.3)
RBC # BLD AUTO: 4.6 10E12/L (ref 3.8–5.2)
SODIUM SERPL-SCNC: 143 MMOL/L (ref 133–144)
WBC # BLD AUTO: 9.1 10E9/L (ref 4–11)

## 2017-04-11 PROCEDURE — 25000125 ZZHC RX 250: Performed by: FAMILY MEDICINE

## 2017-04-11 PROCEDURE — 40000275 ZZH STATISTIC RCP TIME EA 10 MIN

## 2017-04-11 PROCEDURE — 25000132 ZZH RX MED GY IP 250 OP 250 PS 637: Mod: GY | Performed by: HOSPITALIST

## 2017-04-11 PROCEDURE — 80048 BASIC METABOLIC PNL TOTAL CA: CPT | Performed by: STUDENT IN AN ORGANIZED HEALTH CARE EDUCATION/TRAINING PROGRAM

## 2017-04-11 PROCEDURE — 00000146 ZZHCL STATISTIC GLUCOSE BY METER IP

## 2017-04-11 PROCEDURE — A9270 NON-COVERED ITEM OR SERVICE: HCPCS | Mod: GY | Performed by: FAMILY MEDICINE

## 2017-04-11 PROCEDURE — 25000132 ZZH RX MED GY IP 250 OP 250 PS 637: Mod: GY | Performed by: FAMILY MEDICINE

## 2017-04-11 PROCEDURE — 25000132 ZZH RX MED GY IP 250 OP 250 PS 637: Mod: GY | Performed by: STUDENT IN AN ORGANIZED HEALTH CARE EDUCATION/TRAINING PROGRAM

## 2017-04-11 PROCEDURE — 94640 AIRWAY INHALATION TREATMENT: CPT

## 2017-04-11 PROCEDURE — 25000125 ZZHC RX 250: Performed by: HOSPITALIST

## 2017-04-11 PROCEDURE — 97116 GAIT TRAINING THERAPY: CPT | Mod: GP

## 2017-04-11 PROCEDURE — 83735 ASSAY OF MAGNESIUM: CPT | Performed by: STUDENT IN AN ORGANIZED HEALTH CARE EDUCATION/TRAINING PROGRAM

## 2017-04-11 PROCEDURE — 85025 COMPLETE CBC W/AUTO DIFF WBC: CPT | Performed by: STUDENT IN AN ORGANIZED HEALTH CARE EDUCATION/TRAINING PROGRAM

## 2017-04-11 PROCEDURE — A9270 NON-COVERED ITEM OR SERVICE: HCPCS | Mod: GY | Performed by: STUDENT IN AN ORGANIZED HEALTH CARE EDUCATION/TRAINING PROGRAM

## 2017-04-11 PROCEDURE — 85610 PROTHROMBIN TIME: CPT | Performed by: STUDENT IN AN ORGANIZED HEALTH CARE EDUCATION/TRAINING PROGRAM

## 2017-04-11 PROCEDURE — 40000193 ZZH STATISTIC PT WARD VISIT

## 2017-04-11 PROCEDURE — 36415 COLL VENOUS BLD VENIPUNCTURE: CPT | Performed by: STUDENT IN AN ORGANIZED HEALTH CARE EDUCATION/TRAINING PROGRAM

## 2017-04-11 PROCEDURE — 84100 ASSAY OF PHOSPHORUS: CPT | Performed by: STUDENT IN AN ORGANIZED HEALTH CARE EDUCATION/TRAINING PROGRAM

## 2017-04-11 RX ORDER — LACTOBACILLUS RHAMNOSUS GG 10B CELL
1 CAPSULE ORAL
Qty: 18 CAPSULE | Refills: 0 | DISCHARGE
Start: 2017-04-11 | End: 2017-04-25

## 2017-04-11 RX ORDER — ATORVASTATIN CALCIUM 40 MG/1
40 TABLET, FILM COATED ORAL DAILY
Qty: 30 TABLET | Refills: 1 | Status: SHIPPED | OUTPATIENT
Start: 2017-04-11 | End: 2017-04-28

## 2017-04-11 RX ORDER — PREDNISONE 10 MG/1
TABLET ORAL
Qty: 90 TABLET | Refills: 3 | Status: SHIPPED | OUTPATIENT
Start: 2017-04-11 | End: 2018-03-16

## 2017-04-11 RX ORDER — LACTOBACILLUS RHAMNOSUS GG 10B CELL
1 CAPSULE ORAL
Qty: 18 CAPSULE | Refills: 0 | Status: SHIPPED | OUTPATIENT
Start: 2017-04-11 | End: 2017-04-11

## 2017-04-11 RX ORDER — GUAIFENESIN/DEXTROMETHORPHAN 100-10MG/5
10 SYRUP ORAL EVERY 4 HOURS PRN
Qty: 473 ML | Refills: 0 | Status: SHIPPED | OUTPATIENT
Start: 2017-04-11 | End: 2017-05-31

## 2017-04-11 RX ORDER — LEVOFLOXACIN 500 MG/1
500 TABLET, FILM COATED ORAL DAILY
Qty: 2 TABLET | Refills: 0 | Status: SHIPPED | OUTPATIENT
Start: 2017-04-12 | End: 2017-04-11

## 2017-04-11 RX ORDER — LEVOFLOXACIN 500 MG/1
500 TABLET, FILM COATED ORAL DAILY
Status: DISCONTINUED | OUTPATIENT
Start: 2017-04-12 | End: 2017-04-11 | Stop reason: HOSPADM

## 2017-04-11 RX ORDER — POTASSIUM CHLORIDE 1500 MG/1
20 TABLET, EXTENDED RELEASE ORAL DAILY
Qty: 90 TABLET | Refills: 0 | Status: SHIPPED | OUTPATIENT
Start: 2017-04-11 | End: 2017-04-28

## 2017-04-11 RX ORDER — WARFARIN SODIUM 7.5 MG/1
7.5 TABLET ORAL
Status: DISCONTINUED | OUTPATIENT
Start: 2017-04-11 | End: 2017-04-11 | Stop reason: HOSPADM

## 2017-04-11 RX ORDER — LISINOPRIL 2.5 MG/1
2.5 TABLET ORAL DAILY
Qty: 30 TABLET | Refills: 0 | Status: SHIPPED | OUTPATIENT
Start: 2017-04-11 | End: 2017-04-28

## 2017-04-11 RX ORDER — LEVOFLOXACIN 500 MG/1
500 TABLET, FILM COATED ORAL DAILY
Qty: 2 TABLET | Refills: 0 | DISCHARGE
Start: 2017-04-12 | End: 2017-04-28

## 2017-04-11 RX ADMIN — PANTOPRAZOLE SODIUM 40 MG: 20 TABLET, DELAYED RELEASE ORAL at 08:43

## 2017-04-11 RX ADMIN — FUROSEMIDE 40 MG: 40 TABLET ORAL at 15:46

## 2017-04-11 RX ADMIN — LEVOFLOXACIN 750 MG: 750 TABLET, FILM COATED ORAL at 08:43

## 2017-04-11 RX ADMIN — LISINOPRIL 2.5 MG: 2.5 TABLET ORAL at 08:43

## 2017-04-11 RX ADMIN — Medication 1 CAPSULE: at 08:42

## 2017-04-11 RX ADMIN — METOPROLOL SUCCINATE 100 MG: 100 TABLET, FILM COATED, EXTENDED RELEASE ORAL at 08:42

## 2017-04-11 RX ADMIN — ACETYLCYSTEINE 2 ML: 200 INHALANT RESPIRATORY (INHALATION) at 09:16

## 2017-04-11 RX ADMIN — IPRATROPIUM BROMIDE AND ALBUTEROL SULFATE 3 ML: .5; 3 SOLUTION RESPIRATORY (INHALATION) at 09:16

## 2017-04-11 RX ADMIN — POTASSIUM CHLORIDE 20 MEQ: 750 TABLET, EXTENDED RELEASE ORAL at 08:43

## 2017-04-11 RX ADMIN — ATORVASTATIN CALCIUM 40 MG: 40 TABLET, FILM COATED ORAL at 08:42

## 2017-04-11 RX ADMIN — Medication 1 CAPSULE: at 12:49

## 2017-04-11 RX ADMIN — SPIRONOLACTONE 25 MG: 25 TABLET ORAL at 08:44

## 2017-04-11 RX ADMIN — PREDNISONE 30 MG: 20 TABLET ORAL at 08:41

## 2017-04-11 RX ADMIN — FLUTICASONE FUROATE 1 PUFF: 200 POWDER RESPIRATORY (INHALATION) at 08:41

## 2017-04-11 RX ADMIN — FUROSEMIDE 40 MG: 40 TABLET ORAL at 08:43

## 2017-04-11 NOTE — PLAN OF CARE
Problem: Goal Outcome Summary  Goal: Goal Outcome Summary  Physical Therapy Discharge Summary     Reason for therapy discharge:    Discharged to transitional care facility.     Progress towards therapy goal(s). See goals on Care Plan in [facility] electronic health record for goal details.  Goals partially met.  Barriers to achieving goals:   discharge from facility.     Therapy recommendation(s):    Continued therapy is recommended.  Rationale/Recommendations:  Improve functional deficits towards mobility.

## 2017-04-11 NOTE — PLAN OF CARE
Problem: Goal Outcome Summary  Goal: Goal Outcome Summary  Outcome: No Change  [id number]: Vitals stable on room air. Walk test completed with PT - pt had SOB but did not require O2 via NC per PT. Showered with little to no assist today. PIV saline locked. Diabetes Educator met with patient today - pt has questions and seems a little anxious about going home with insulin. Diabetes Educator to return tomorrow. Phosphorus to be replaced this evening per protocol (new order in afternoon). Insulin given sliding scale and carb coverage - pt to be involved in BG checks and insulin administration. Pt self administered her dinner insulin with verbal assistance. Friends and family visiting this afternoon/evening. PT recommending TCU.

## 2017-04-11 NOTE — PROGRESS NOTES
Social Work Services Discharge Note      Patient Name:  Betty Tee     Anticipated Discharge Date:  4/11/2017    Discharge Disposition:   TCU:  [facility], T: [phone], F: [phone]  Faxed DC ppwk at 3:45 PM    Following MD:  Lenny Greenwood MD     Pre-Admission Screening (PAS) online form has been completed.  The Level of Care (LOC) is:  Determined  Confirmation Code is:  [id number]  Patient/caregiver informed of referral to [facility] Line for Pre-Admission Screening for skilled nursing facility (SNF) placement and to expect a phone call post discharge from SNF.     Additional Services/Equipment Arranged:  Friend transport     Patient / Family response to discharge plan:  in agreement     Persons notified of above discharge plan:  Patient, Nghia Crawford Pat, Charge RN, [facility], TCU    Staff Discharge Instructions:  Please fax discharge orders and signed hard scripts for any controlled substances.  Please print a packet and send with patient.     CTS Handoff completed:  YES    Medicare Notice of Rights provided to the patient/family:  YES    GINNA Brewer  5B  (Medical/Surgical)  Phone: [phone]  Pager: [phone]

## 2017-04-11 NOTE — PHARMACY-ANTICOAGULATION SERVICE
Clinical Pharmacy- Warfarin Discharge Note  This patient is currently on warfarin for the treatment of Atrial fibrillation.  INR Goal= 2-3  Expected length of therapy lifetime.    Anticoagulation Dose History     Recent Dosing and Labs Latest Ref Rng & Units 4/5/2017 4/6/2017 4/7/2017 4/8/2017 4/9/2017 4/10/2017 4/11/2017    ZZ IMS TEMPLATE - 9 mg - - - - - -    Warfarin 3 mg - - - - 3 mg - - -    Warfarin 4 mg - - - 8 mg - - - -    Warfarin 6 mg - - - - - - 6 mg -    Warfarin 7.5 mg - - 7.5 mg - - - - 7.5 mg (should be given tonight)    INR 0.86 - 1.14 1.67(H) 2.02(H) 2.03(H) 3.09(H) 3.76(H) 2.47(H) 2.05(H)    INR 0.86 - 1.14 - - - - - - -    INR Point of Care 0.86 - 1.14 - - - - - - -          Vitamin K doses administered during the last 7 days: None  FFP administered during the last 7 days: None  Recommend discharging the patient on a warfarin regimen of 7.5 mg by mouth daily (PTA home dose) with a prescription for warfarin 7.5mg tablets.      The patient should have an INR checked by the end of this week. The patient will be following up with PCP (tentatively) on Friday and should have her INR checked then.    Jayesh Kramer, PharmD Student  April 11, 2017

## 2017-04-11 NOTE — PLAN OF CARE
Problem: Goal Outcome Summary  Goal: Goal Outcome Summary  Outcome: No Change  1900-0730  Pt A&O. VSS on RA. Pt up SBA in room, calling appropriately. Phophorus replaced last evening, recheck this AM. RPIV saline locked. No complaints of SOB, on RA for entire shift. Pt's  at bedtime, pt was able to properly poke finger and put drop of blood on glucometer. Pt was able to correctly determine how many units of insulin she needed per sliding scale. Pt was able to correctly administer novolog, 1 unit. Pt continues to be a little overwhelmed with the administration process, but did great with encouragement. Voiding adequately, no BM this shift.  overnight. Pt looking forward to having diabetes educator seeing her again today. PT recommending TCU, pt would prefer to go home. Pt continues to have infrequent cough, droplet isolation continues.

## 2017-04-11 NOTE — PLAN OF CARE
"Problem: Goal Outcome Summary  Goal: Goal Outcome Summary  PT-5A: Amb ~ 600 feet with FWW + MOD I, demonstrating more stability for amb longer distances, would recommend FWW for community amb.  Climbed 3 stairs with 2 HHA on railing + SBA demonstrating reciprocal pattern.  SpO2 >96% throughout activity although pt reporting SOB.      Pt declining recommendation to TCU secondary to \"knowing too many people there and wouldn't get any rest\".  PT informed pt she would received continued rehab to improve functional mobility and further diabetes education as pt was asking questions during session - instructed to ask RN questions.  If pt continues to decline TCU recommendation, then recommend home + home PT however PT feels pt would benefit more from TCU.      "

## 2017-04-11 NOTE — PROGRESS NOTES
"Social Work Services Progress Note    Hospital Day: 10  Date of Initial Social Work Evaluation:  4/10/2017  Collaborated with:  Patient, Friend Pat, medical team, admissions    Data:  Betty is a 76 year old female admitted for influenza and AMS. Comes from home with a roommate, belongs as a nun to the [employer]. Patient would like to DC to TCU for weakness and ongoing diabetes management/learning.     Intervention: Referrals:  [facility]: Spoke with admissions, not beds until Friday.    [facility]: LVM for admissions, faxed referral for review   -12:05 pm Update: Received call back from marielos in admissions, provided 5A number for RN to RN report. She will call  back \"shortly\" with decision.    [facility]: [facility] for admissions, faxed referral for review    Assessment:  Patient is agreeable to TCU per encouragement from her friends/POA/roommate (Nghia, Yvette, and Ariana). However it is likely that if she does not find placement today at one of her states options, she may go home with HC. TBD.     Plan:    Anticipated Disposition:  TBD TCU vs Home    Barriers to d/c plan:  Placement. Patient is medically ready    Follow Up:   following for Dc today.    Alta KAUFMAN, MSW  5B  (Medical/Surgical)  Phone: [phone]  Pager: [phone]         "

## 2017-04-11 NOTE — PLAN OF CARE
Problem: Goal Outcome Summary  Goal: Goal Outcome Summary  Pt has been alert and oriented X 4, VSS, O2 sats has been within acceptable range on RA with activities, up in room independently, denied any pain, tolerated diet well. Pt has been very overwhelmed about the management of her diabetes. Diabetic teaching performed X 2 today by diabetic educator. This writer walked pt thru and observed pt perform her own insulin injections for breakfast and lunch. Pt still need a lot of repetitions yet. Medicine team ordered to have pt d/elvis to TCU. PIV access removed. Report given to the nurse at [facility]. Pt was accompanied to TCU by POA.

## 2017-04-11 NOTE — PROGRESS NOTES
Met with patient x2 today.  We went through the Accu-check Amanda Plus blood glucose monitor.  When I came back the second time, she did not recall what the black lancing device was for and how to use.  Reviewed verbally that her blood glucose is to be checked before meals and at bedtime.  Also reviewed verbally the insulin plan:  Lantus Solostar pen 23 units daily and NovoLog 10 units with meals.    Her friend/POA was taking notes.  Plan is to discharge to TCU at [facility] today.  Meghan Stone MS RN CDE CDTC  [phone]

## 2017-04-12 ENCOUNTER — AMBULATORY - HEALTHEAST (OUTPATIENT)
Dept: ADMINISTRATIVE | Facility: CLINIC | Age: 77
End: 2017-04-12

## 2017-04-12 ENCOUNTER — CARE COORDINATION (OUTPATIENT)
Dept: CARDIOLOGY | Facility: CLINIC | Age: 77
End: 2017-04-12

## 2017-04-12 LAB
BACTERIA SPEC CULT: NO GROWTH
BACTERIA SPEC CULT: NO GROWTH
DLCOUNC-%PRED-PRE: 89 %
DLCOUNC-PRE: 18.22 ML/MIN/MMHG
DLCOUNC-PRED: 20.36 ML/MIN/MMHG
ERV-%PRED-PRE: 15 %
ERV-PRE: 0.06 L
ERV-PRED: 0.37 L
EXPTIME-PRE: 7.4 SEC
FEF2575-%PRED-PRE: 66 %
FEF2575-PRE: 1.13 L/SEC
FEF2575-PRED: 1.7 L/SEC
FEFMAX-%PRED-PRE: 95 %
FEFMAX-PRE: 5.09 L/SEC
FEFMAX-PRED: 5.32 L/SEC
FEV1-%PRED-PRE: 60 %
FEV1-PRE: 1.29 L
FEV1FEV6-PRE: 80 %
FEV1FEV6-PRED: 78 %
FEV1FVC-PRE: 80 %
FEV1FVC-PRED: 77 %
FEV1SVC-PRE: 79 %
FEV1SVC-PRED: 68 %
FIFMAX-PRE: 2.43 L/SEC
FRCPLETH-%PRED-PRE: 83 %
FRCPLETH-PRE: 2.35 L
FRCPLETH-PRED: 2.8 L
FVC-%PRED-PRE: 58 %
FVC-PRE: 1.62 L
FVC-PRED: 2.78 L
IC-%PRED-PRE: 57 %
IC-PRE: 1.59 L
IC-PRED: 2.74 L
MICRO REPORT STATUS: NORMAL
MICRO REPORT STATUS: NORMAL
RVPLETH-%PRED-PRE: 102 %
RVPLETH-PRE: 2.29 L
RVPLETH-PRED: 2.24 L
SPECIMEN SOURCE: NORMAL
SPECIMEN SOURCE: NORMAL
TLCPLETH-%PRED-PRE: 75 %
TLCPLETH-PRE: 3.94 L
TLCPLETH-PRED: 5.19 L
VA-%PRED-PRE: 54 %
VA-PRE: 2.88 L
VC-%PRED-PRE: 52 %
VC-PRE: 1.65 L
VC-PRED: 3.11 L

## 2017-04-12 NOTE — PROGRESS NOTES
Patient is seen at [facility] so they will handle the patient's post discharge follow up              [facility]  Discharge Summary     Betty Tee MRN# [id number]   Age: 76 year old YOB: 1940      Date of Admission:  4/2/2017  Date of Discharge:  4/11/2017 4:14 PM  Admitting Physician:  Meghann White MD  Discharge Physician:  Lenny Greenwood MD Contact: [phone]  Discharging Service:  [facility]

## 2017-04-12 NOTE — DISCHARGE SUMMARY
[facility]  Discharge Summary    Betty Tee MRN# [id number]   Age: 76 year old YOB: 1940     Date of Admission:  4/2/2017  Date of Discharge:  4/11/2017  4:14 PM  Admitting Physician:  Meghann White MD  Discharge Physician:  Lenny Greenwood MD  Contact: [phone]  Discharging Service:  [facility]     Primary Provider:   Ilene Tristan  [facility] [address]  [address] / [place]*  [phone]          Discharge Disposition:   Discharged to rehabilitation facility    Physical therapy is needed to continue to improve her stamina and strength.       Condition on Discharge:   Discharge condition: Good   Code status on discharge: Full Code          Admission Diagnoses:   Fever, unspecified [R50.9]  Atrial fibrillation with rapid ventricular response (H) [I48.91]  Pneumonia due to infectious organism, unspecified laterality, unspecified part of lung [J18.9]  Acute respiratory failure (H) [J96.00]  Acute respiratory failure with hypoxia (H) [J96.01]          Principle Discharge Problem:   Acute respiratory failure, resolved         Additional Discharge Problems:     Patient Active Problem List   Diagnosis     Temporomandibular joint disorder     Diverticulitis of colon     Disorder of bone and cartilage     Osteoarthritis     Alcohol abuse, in remission     Restless legs syndrome (RLS)     Major depressive disorder, recurrent episode, moderate     Essential hypertension with goal blood pressure less than 140/90     CARDIOVASCULAR SCREENING; LDL GOAL LESS THAN 130     Sciatica     Advanced directives, counseling/discussion     Colouterine fistula     Atrial fibrillation with controlled ventricular response (H)     Left ventricular hypertrophy     Health Care Home     Insomnia     Joint pains     Allergic reaction caused by a drug - likely plaquenil     Breast fibroadenoma     DVT (deep venous  thrombosis) (H)     Fatty liver disease, nonalcoholic     Rib pain     Neck mass     Gastroesophageal reflux disease without esophagitis     Enlarged lymph node     Sjogren's syndrome (H)     ANGELICA (obstructive sleep apnea)     ILD (interstitial lung disease) (H)     Lower GI bleed     Acute and chronic respiratory failure with hypoxia (H)     Acute edema of lung (H)     (HFpEF) heart failure with preserved ejection fraction (H)     Type 2 diabetes mellitus without complication, without long-term current use of insulin (H)     CAP (community acquired pneumonia)     HCAP (healthcare-associated pneumonia)     Influenza B     Heart failure, chronic, with acute decompensation (H)     Type 2 diabetes mellitus with hyperglycemia (H)            Medications Prior to Admission:     No current facility-administered medications on file prior to encounter.   Current Outpatient Prescriptions on File Prior to Encounter:  metFORMIN (GLUCOPHAGE-XR) 500 MG 24 hr tablet Take 2 tablets (1,000 mg) by mouth daily (with dinner)   PARoxetine (PAXIL) 10 MG tablet TAKE 1 TABLET (10mg) BY MOUTH AT BEDTIME (Patient taking differently: Take 5 mg by mouth every morning TAKE 1 TABLET (10mg) BY MOUTH AT BEDTIME)   traZODone (DESYREL) 50 MG tablet TAKE 1/2-1 TABLET BY MOUTH NIGHTLY AS NEEDED, CAN TITRATE DOWN TO 1/2TAB OR UP TO 2TABS AS NEEDED   ketoconazole (NIZORAL) 2 % cream Apply topically 2 times daily   furosemide (LASIX) 40 MG tablet Take 1 tablet (40 mg) by mouth 2 times daily   metoprolol (TOPROL-XL) 100 MG 24 hr tablet Take 1 tablet (100 mg) by mouth daily   spironolactone (ALDACTONE) 25 MG tablet Take 1 tablet (25 mg) by mouth daily   montelukast (SINGULAIR) 10 MG tablet Take 1 tablet (10 mg) by mouth At Bedtime   albuterol (PROAIR HFA, PROVENTIL HFA, VENTOLIN HFA) 108 (90 BASE) MCG/ACT inhaler Inhale 2 puffs into the lungs every 6 hours   azithromycin (ZITHROMAX) 250 MG tablet Take 1 tablet (250 mg) by mouth daily   warfarin (COUMADIN)  7.5 MG tablet Current dose is 7.5 mg daily.  Dose adjusted per INR result.   acyclovir (ZOVIRAX) 5 % ointment Apply topically 6 times daily (Patient taking differently: Apply topically 6 times daily As needed for outbreaks)   fluticasone (FLOVENT HFA) 220 MCG/ACT inhaler Inhale 2 puffs into the lungs 2 times daily   fluticasone (FLONASE) 50 MCG/ACT nasal spray Spray 1-2 sprays into both nostrils daily (Patient taking differently: Spray 1-2 sprays into both nostrils daily as needed for allergies )   acetaminophen (TYLENOL) 500 MG tablet Take 500 mg by mouth nightly as needed    order for DME Oxygen: Patient requires supplemental Oxygen 4 LPM via nasal canula with activity. Please provide patient with POC to improve mobility, okay to titrate for conserving to keep stats above 90%. Please fax results to [phone].  Oxygen will be for a lifetime.   pantoprazole (PROTONIX) 20 MG EC tablet Take 1-2 tablets (20-40 mg) by mouth daily   order for DME Oxygen: Patient requires supplemental Oxygen 4 LPM via nasal canula with activity. Please provide patient with a home unit and with portability capability. Oxygen will be for a lifetime.   polyethylene glycol (MIRALAX/GLYCOLAX) packet Take 17 g by mouth daily as needed for constipation   acyclovir (ZOVIRAX) 400 MG tablet Take 400 mg by mouth See Admin Instructions 5 times daily as needed for outbreaks   COMPRESSION STOCKINGS Wear compression stockings in affected leg (right leg) or both legs most of the time during the day and take them off at night.            Discharge Medications:        Review of your medicines      START taking       Dose / Directions    atorvastatin 40 MG tablet   Commonly known as:  LIPITOR   Used for:  Type 2 diabetes mellitus with other specified complication (H)        Dose:  40 mg   Take 1 tablet (40 mg) by mouth daily   Quantity:  30 tablet   Refills:  1       guaiFENesin-dextromethorphan 100-10 MG/5ML syrup   Commonly known as:  ROBITUSSIN DM    Used for:  Pneumonia due to infectious organism, unspecified laterality, unspecified part of lung        Dose:  10 mL   Take 10 mLs by mouth every 4 hours as needed for cough   Quantity:  473 mL   Refills:  0       insulin aspart 100 UNIT/ML injection   Commonly known as:  NovoLOG PEN   Used for:  Type 2 diabetes mellitus with other specified complication (H)        Dose:  10 Units   Inject 10 Units Subcutaneous 3 times daily (with meals)   Quantity:  3 mL   Refills:  0       insulin glargine 100 UNIT/ML injection   Commonly known as:  LANTUS   Used for:  Type 2 diabetes mellitus with other specified complication (H)        Dose:  23 Units   Inject 23 Units Subcutaneous every morning (before breakfast)   Quantity:  3 mL   Refills:  1       lactobacillus rhamnosus (GG) capsule   Used for:  Pneumonia due to infectious organism, unspecified laterality, unspecified part of lung        Dose:  1 capsule   Take 1 capsule by mouth 3 times daily (before meals)   Quantity:  18 capsule   Refills:  0       levofloxacin 500 MG tablet   Commonly known as:  LEVAQUIN   Indication:  Community Acquired Pneumonia   Used for:  Pneumonia due to infectious organism, unspecified laterality, unspecified part of lung        Dose:  500 mg   Start taking on:  4/12/2017   Take 1 tablet (500 mg) by mouth daily   Quantity:  2 tablet   Refills:  0       lisinopril 2.5 MG tablet   Commonly known as:  PRINIVIL/Zestril   Used for:  Essential hypertension, malignant        Dose:  2.5 mg   Take 1 tablet (2.5 mg) by mouth daily   Quantity:  30 tablet   Refills:  0       potassium chloride SA 20 MEQ CR tablet   Commonly known as:  K-DUR/KLOR-CON M   Used for:  Acute on chronic heart failure, unspecified heart failure type (H)        Dose:  20 mEq   Take 1 tablet (20 mEq) by mouth daily   Quantity:  90 tablet   Refills:  0         CONTINUE these medicines which may have CHANGED, or have new prescriptions. If we are uncertain of the size of  tablets/capsules you have at home, strength may be listed as something that might have changed.       Dose / Directions    acyclovir 5 % ointment   Commonly known as:  ZOVIRAX   This may have changed:  additional instructions   Used for:  Recurrent cold sores        Apply topically 6 times daily   Quantity:  15 g   Refills:  3       fluticasone 50 MCG/ACT spray   Commonly known as:  FLONASE   This may have changed:    - when to take this  - reasons to take this   Used for:  Seasonal allergic rhinitis        Dose:  1-2 spray   Spray 1-2 sprays into both nostrils daily   Quantity:  16 g   Refills:  5       PARoxetine 10 MG tablet   Commonly known as:  PAXIL   This may have changed:    - how much to take  - how to take this  - when to take this  - additional instructions   Used for:  Major depressive disorder, recurrent episode, moderate (H)        TAKE 1 TABLET (10mg) BY MOUTH AT BEDTIME   Quantity:  30 tablet   Refills:  1       predniSONE 10 MG tablet   Commonly known as:  DELTASONE   This may have changed:    - how much to take  - how to take this  - when to take this  - additional instructions   Used for:  ILD (interstitial lung disease) (H), Sjogren's syndrome (H)        Take 2 tablets for three days (4/12-14), then take 1 tablet daily (10 mg daily starting 4/15)   Quantity:  90 tablet   Refills:  3         CONTINUE these medicines which have NOT CHANGED       Dose / Directions    acyclovir 400 MG tablet   Commonly known as:  ZOVIRAX        Dose:  400 mg   Take 400 mg by mouth See Admin Instructions 5 times daily as needed for outbreaks   Refills:  0       albuterol 108 (90 BASE) MCG/ACT Inhaler   Commonly known as:  PROAIR HFA/PROVENTIL HFA/VENTOLIN HFA   Used for:  ILD (interstitial lung disease) (H)        Dose:  2 puff   Inhale 2 puffs into the lungs every 6 hours   Quantity:  1 Inhaler   Refills:  3       azithromycin 250 MG tablet   Commonly known as:  ZITHROMAX   Used for:  Follicular bronchiolitis (H)         Dose:  250 mg   Take 1 tablet (250 mg) by mouth daily   Quantity:  30 tablet   Refills:  11       COMPRESSION STOCKINGS   Used for:  DVT (deep venous thrombosis), right, Postphlebitic syndrome, Chronic anticoagulation, Atrial fibrillation (H)        Wear compression stockings in affected leg (right leg) or both legs most of the time during the day and take them off at night.   Quantity:  2 each   Refills:  2       fluticasone 220 MCG/ACT Inhaler   Commonly known as:  FLOVENT HFA   Used for:  ILD (interstitial lung disease) (H), Follicular bronchiolitis (H)        Dose:  2 puff   Inhale 2 puffs into the lungs 2 times daily   Quantity:  3 Inhaler   Refills:  3       furosemide 40 MG tablet   Commonly known as:  LASIX   Used for:  Acute edema of lung (H)        Dose:  40 mg   Take 1 tablet (40 mg) by mouth 2 times daily   Quantity:  180 tablet   Refills:  3       ketoconazole 2 % cream   Commonly known as:  NIZORAL   Used for:  Cutaneous candidiasis        Apply topically 2 times daily   Quantity:  60 g   Refills:  1       metFORMIN 500 MG 24 hr tablet   Commonly known as:  GLUCOPHAGE-XR   Used for:  Type 2 diabetes mellitus without complication, without long-term current use of insulin (H)        Dose:  1000 mg   Take 2 tablets (1,000 mg) by mouth daily (with dinner)   Quantity:  180 tablet   Refills:  0       metoprolol 100 MG 24 hr tablet   Commonly known as:  TOPROL-XL   Used for:  Atrial fibrillation with controlled ventricular response (H)        Dose:  100 mg   Take 1 tablet (100 mg) by mouth daily   Quantity:  90 tablet   Refills:  3       montelukast 10 MG tablet   Commonly known as:  SINGULAIR   Used for:  Sjogren's syndrome (H), ILD (interstitial lung disease) (H)        Dose:  10 mg   Take 1 tablet (10 mg) by mouth At Bedtime   Quantity:  90 tablet   Refills:  1       * order for DME   Used for:  Hypoxia, ILD (interstitial lung disease) (H)        Oxygen: Patient requires supplemental Oxygen 4 LPM via  nasal canula with activity. Please provide patient with a home unit and with portability capability. Oxygen will be for a lifetime.   Quantity:  1 Device   Refills:  0       * order for DME   Used for:  Hypoxia, ILD (interstitial lung disease) (H)        Oxygen: Patient requires supplemental Oxygen 4 LPM via nasal canula with activity. Please provide patient with POC to improve mobility, okay to titrate for conserving to keep stats above 90%. Please fax results to [phone].  Oxygen will be for a lifetime.   Quantity:  1 Device   Refills:  0       pantoprazole 20 MG EC tablet   Commonly known as:  PROTONIX   Used for:  Gastroesophageal reflux disease without esophagitis        Dose:  20-40 mg   Take 1-2 tablets (20-40 mg) by mouth daily   Quantity:  60 tablet   Refills:  1       polyethylene glycol Packet   Commonly known as:  MIRALAX/GLYCOLAX   Used for:  Constipation, unspecified constipation type        Dose:  17 g   Take 17 g by mouth daily as needed for constipation   Quantity:  7 packet   Refills:  0       spironolactone 25 MG tablet   Commonly known as:  ALDACTONE   Used for:  Chronic diastolic congestive heart failure (H)        Dose:  25 mg   Take 1 tablet (25 mg) by mouth daily   Quantity:  90 tablet   Refills:  3       traZODone 50 MG tablet   Commonly known as:  DESYREL   Used for:  Insomnia due to medical condition        TAKE 1/2-1 TABLET BY MOUTH NIGHTLY AS NEEDED, CAN TITRATE DOWN TO 1/2TAB OR UP TO 2TABS AS NEEDED   Quantity:  60 tablet   Refills:  3       TYLENOL 500 MG tablet   Used for:  Dizziness   Generic drug:  acetaminophen        Dose:  500 mg   Take 500 mg by mouth nightly as needed   Refills:  0       warfarin 7.5 MG tablet   Commonly known as:  COUMADIN   Used for:  Atrial fibrillation with controlled ventricular response (H)        Current dose is 7.5 mg daily.  Dose adjusted per INR result.   Quantity:  100 tablet   Refills:  3       * Notice:  This list has 2 medication(s) that are  the same as other medications prescribed for you. Read the directions carefully, and ask your doctor or other care provider to review them with you.         Where to get your medicines      These medications were sent to [facility] - [place], [place] - [address]  [address] [address]     Phone:  [phone]      atorvastatin 40 MG tablet     guaiFENesin-dextromethorphan 100-10 MG/5ML syrup     insulin glargine 100 UNIT/ML injection     lisinopril 2.5 MG tablet     potassium chloride SA 20 MEQ CR tablet     predniSONE 10 MG tablet         Some of these will need a paper prescription and others can be bought over the counter. Ask your nurse if you have questions.     Bring a paper prescription for each of these medications      insulin aspart 100 UNIT/ML injection       You don't need a prescription for these medications      lactobacillus rhamnosus (GG) capsule     levofloxacin 500 MG tablet                  Discharge Instructions and Follow-Up:   Discharge diet: Diabetic (1800 ADA)  Low salt   Discharge activity: Activity as tolerated   Discharge follow-up: Follow up with primary care provider or physician Friday, April 14  Patient will need BMP, Mag, Phos, and INR checked   Lines and drains: None    Wound care: None          Brief Admission History and Evaluation:   Betty Tee is a 76 year old with a history of HFpEF, DM2, ILD, and AF with RVR who was admitted with AMS and for evaluation of persistent cough, dizziness, and fever and found to be positive for influenza.  She developed an acute hypoxic respiratory failure 4/6 that was due to pulmonary congestion.            Hospital Course/Discharge Plan by Problem:   ##Possible Hospital Acquired Pneumonia   ##Influenza:  ##ILD    Patient was admitted and initially treated for CAP.  Antibiotics were started and home steroids were increased.  She was also given nebulizer treatments, IVFs, and oxygen.  After  starting supportive cares and antibiotics, patient's dizziness and altered mental status resolved.  Patient tested positive for influenza, so she was started on Tamiflu.  Blood cultures, WBC, and procalcitonin did not suggest a bacterial infection, but patient was continued on antibiotics throughout her hospital admission for the severity of her respiratory illness.       ## Acute hypoxic respiratory failure, pulmonary congestion:  ## HFpEF:  Patient had an episode of acute hypoxic respiratory failure that required temporary transfer (approximately 3 days) to the intermediate care unit.  Etiology was thought to be multifactorial from acute decompensation of her HFpEF in setting of acute respiratory illness and holding her diuretics in context of ILD. Her oxygen requirement decreased after diuresis.  Prior to discharge, patient no longer required oxygen with short periods of walking.  She was discharged on her home regimen for heart failure including lasix 40 mg bid, spironolactone 25 mg daily, and metoprolol.  During patient's episode of acute hypoxic respiratory failure, she received a cardiology workup that included a TTE that showed akinesis of the anteroseptal wall.  Cardiology was consulted and stated it was most likely from the Afib with RVR.  No further workup/medical management was needed.     ## Dizziness/AMS  Resolved shortly after admission.  Etiology was likely multifactorial: Hypoxia and acute respiratory illness. Negative ACS and stroke work up.       ## A Fib ,rate controlled   INR goal 2.0-3.0, INR currently at goal.  Patient was discharged on home coumadin dose     ## HTN , within acceptable ranges     ## Diabetes Mellitus- uncontrolled  Hgb A1c 14.3 (4/2017).  Patient's diabetes was poorly controlled during her hospitalization, most likely secondary to the steroids she was receiving.  She was discharged on Lantus 23 units daily, 10 units Novolog with each meal, Metformin 1000 mg daily and an ACE  inhibitor.  Patient also received diabetes education while in the hospital.      ## ASCVD Risk:  ASCVD risk calculated. 10 year risk is 42.6%.  Atorvastatin 40 mg daily was started.      ##Sjorgren Syndrome, Stable          Final Day of Progress before Discharge:         Interval History:  General:  feels better, appears to be improving, more alert, more confortable and more responsive   Vitals: blood pressure improved, respiratory rate improved, heart rate improfved, vital signs have improved and fever resolved   Intake/Output: Tolerating a regular diet   Nutrition: regular diet   Mental status: improved cognition and overall mental status, more alert, more responsive and less confused   Respiratory: improved respiratory effort, improved oxigenation, wheezing improved and less short of breath   Cardiovascular improved heart rate and blood pressure improved   Renal: no change in renal function or urinary output   Neurologic: no focal deficits reported and no changes reported since previous exam   Medications: See medication list   Interventions: BiPAP   Consults: Consultation received from cardiology             Physical Exam:  Vitals were reviewed  Physical Exam   Constitutional: She is oriented to person, place, and time. She appears well-developed and well-nourished. No distress.   Neck: Normal range of motion. No JVD present.   Cardiovascular: Normal rate and intact distal pulses.    No murmur heard.  Pulmonary/Chest: Effort normal. No stridor. No respiratory distress. She has no wheezes.   Abdominal: Soft. Bowel sounds are normal. She exhibits no distension. There is no tenderness.   Musculoskeletal: Normal range of motion. She exhibits edema (trace edema bilaterally). She exhibits no tenderness.   Neurological: She is alert and oriented to person, place, and time.   Skin: Skin is warm and dry.   Psychiatric: She has a normal mood and affect. Her behavior is normal. Judgment and thought content normal.   Vitals  reviewed.         Data:  All laboratory data reviewed  All cardiac studies reviewed by me.  All imaging studies reviewed by me.    4/6/2017 TTE:  Interpretation Summary  Left ventricular function is normal. The visually estimated EF is 60-65%.  There is basal to mid anteroseptal akinesis.  Right ventricular function, chamber size, wall motion, and thickness are  normal.  No significant valvular abnormalities.  The inferior vena cava was normal in size with preserved respiratory  variability.  This study was compared with the study from 7/3/2016 . The basal to mid  anteroseptal akinesis is new.       Pending Results:   None      It was a pleasure to participate in the care of Betty Tee.  If you have questions about her care, please do not hesitate to contact the [facility] team via the hospital [facility] on which we are stationed.      [facility]  [facility], [address] - [phone]

## 2017-04-14 ENCOUNTER — TRANSFERRED RECORDS (OUTPATIENT)
Dept: HEALTH INFORMATION MANAGEMENT | Facility: CLINIC | Age: 77
End: 2017-04-14

## 2017-04-14 ENCOUNTER — OFFICE VISIT - HEALTHEAST (OUTPATIENT)
Dept: GERIATRICS | Facility: CLINIC | Age: 77
End: 2017-04-14

## 2017-04-14 DIAGNOSIS — E78.5 HYPERLIPIDEMIA: ICD-10-CM

## 2017-04-14 DIAGNOSIS — J18.9 PNEUMONIA: ICD-10-CM

## 2017-04-14 DIAGNOSIS — E11.9 DIABETES MELLITUS (H): ICD-10-CM

## 2017-04-14 DIAGNOSIS — I10 ESSENTIAL HYPERTENSION: ICD-10-CM

## 2017-04-14 DIAGNOSIS — K21.9 GERD (GASTROESOPHAGEAL REFLUX DISEASE): ICD-10-CM

## 2017-04-18 ENCOUNTER — MEDICAL CORRESPONDENCE (OUTPATIENT)
Dept: HEALTH INFORMATION MANAGEMENT | Facility: CLINIC | Age: 77
End: 2017-04-18

## 2017-04-18 ENCOUNTER — OFFICE VISIT - HEALTHEAST (OUTPATIENT)
Dept: GERIATRICS | Facility: CLINIC | Age: 77
End: 2017-04-18

## 2017-04-18 ENCOUNTER — ALLIED HEALTH/NURSE VISIT (OUTPATIENT)
Dept: EDUCATION SERVICES | Facility: CLINIC | Age: 77
End: 2017-04-18
Payer: MEDICARE

## 2017-04-18 ENCOUNTER — TRANSFERRED RECORDS (OUTPATIENT)
Dept: HEALTH INFORMATION MANAGEMENT | Facility: CLINIC | Age: 77
End: 2017-04-18

## 2017-04-18 VITALS — BODY MASS INDEX: 31.58 KG/M2 | WEIGHT: 200 LBS

## 2017-04-18 DIAGNOSIS — E11.9 TYPE 2 DIABETES MELLITUS WITHOUT COMPLICATION, WITHOUT LONG-TERM CURRENT USE OF INSULIN (H): Primary | ICD-10-CM

## 2017-04-18 DIAGNOSIS — J11.1 INFLUENZA: ICD-10-CM

## 2017-04-18 DIAGNOSIS — J18.9 PNEUMONIA: ICD-10-CM

## 2017-04-18 DIAGNOSIS — I48.91 ATRIAL FIBRILLATION (H): ICD-10-CM

## 2017-04-18 DIAGNOSIS — R41.82 ALTERED MENTAL STATUS: ICD-10-CM

## 2017-04-18 DIAGNOSIS — E11.9 DIABETES MELLITUS (H): ICD-10-CM

## 2017-04-18 DIAGNOSIS — J96.90 RESPIRATORY FAILURE (H): ICD-10-CM

## 2017-04-18 DIAGNOSIS — I10 ESSENTIAL HYPERTENSION: ICD-10-CM

## 2017-04-18 PROCEDURE — G0108 DIAB MANAGE TRN  PER INDIV: HCPCS

## 2017-04-18 NOTE — PATIENT INSTRUCTIONS
My Diabetes Care Goals:    Healthy Eating: Eat three meals a day and up to three snacks per day.  Try to stick to about 2-3 carb servings at your meals and 1-2 servings at a snack.    Monitoring: Test your blood sugar 4 times per days.    Taking Medication: Increase your Novolog to 12 units plus your sliding scale before each meal.    Problem Solving:  glucose tablets at your pharmacy.    Follow up:  Follow-up phone call in 1 week.  [phone]     Bring blood glucose meter and logbook with you to all doctor and follow-up appointments.     [facility] for the [facility]:  For Your Diabetes Education and Nutrition Appointments Call:  [phone]   For Diabetes Education or Nutrition Related Questions:   Phone: [phone]  E-mail: [email or web address]  Fax: [phone]   If you need a medication refill please contact your pharmacy. Please allow 3 business days for your refills to be completed.    Instructions for emailing the Diabetes Educators    If you need to communicate a non-urgent message to a Diabetes Educator via email, please send to [email or web address].    Please follow the following email guidelines:    Subject line: Secure: your clinic name (example: Secure: [email or web address])  In the email please include: First name, middle initial, last name and date of birth.    We will be in touch with you within one (1) business day.

## 2017-04-18 NOTE — MR AVS SNAPSHOT
After Visit Summary   4/18/2017    Betty Tee    MRN: [id number]           Patient Information     Date Of Birth          1940        Visit Information        Provider Department      4/18/2017 3:00 PM  DIABETIC ED RESOURCE [facility]        Care Instructions    My Diabetes Care Goals:    Healthy Eating: Eat three meals a day and up to three snacks per day.  Try to stick to about 2-3 carb servings at your meals and 1-2 servings at a snack.    Monitoring: Test your blood sugar 4 times per days.    Taking Medication: Increase your Novolog to 12 units plus your sliding scale before each meal.    Problem Solving:  glucose tablets at your pharmacy.    Follow up:  Follow-up phone call in 1 week.  [phone]     Bring blood glucose meter and logbook with you to all doctor and follow-up appointments.     [facility] for the [facility]:  For Your Diabetes Education and Nutrition Appointments Call:  [phone]   For Diabetes Education or Nutrition Related Questions:   Phone: [phone]  E-mail: [email or web address]  Fax: [phone]   If you need a medication refill please contact your pharmacy. Please allow 3 business days for your refills to be completed.    Instructions for emailing the Diabetes Educators    If you need to communicate a non-urgent message to a Diabetes Educator via email, please send to [email or web address].    Please follow the following email guidelines:    Subject line: Secure: your clinic name (example: Secure: [facility])  In the email please include: First name, middle initial, last name and date of birth.    We will be in touch with you within one (1) business day.           Follow-ups after your visit        Your next 10 appointments already scheduled     Apr 25, 2017 11:30 AM CDT   Office Visit with Ilene Tristan MD   [facility]    [address]  [place]  [facility] [id number] [phone]           Bring a current list of meds and any records pertaining to this visit.  For Physicals, please bring immunization records and any forms needing to be filled out.  Please arrive 10 minutes early to complete paperwork.            Jun 22, 2017  4:30 PM CDT   Lab with  LAB   [facility] ([facility])    [address] [place] [id number] [phone]            Jun 22, 2017  5:00 PM CDT   (Arrive by 4:45 PM)   RETURN HEART FAILURE with Radha Rosa MD   [facility] ([facility])    [address] [place] [id number] [phone]            Jul 05, 2017 11:00 AM CDT   PFT VISIT with  PFL B   [facility] ([facility])    [address] [place] [id number] [phone]            Jul 05, 2017 11:30 AM CDT   (Arrive by 11:15 AM)   Return Interstitial Lung with Meño Walsh MD   [facility] ([facility])    [address] [place] [id number] [phone]              Who to contact     If you have questions or need follow up information about today's clinic visit or your schedule please contact [facility] directly at [phone].  Normal or non-critical lab and imaging results will be communicated to you by MyChart, letter or phone within 4 business days after the clinic has received the results. If you do not hear from us within 7 days, please contact the clinic through MyChart or phone. If you have a critical or abnormal lab result, we will notify you by phone as soon as possible.  Submit refill requests through [employer] or call your pharmacy and they will forward the refill request to us. Please allow 3 business days for your refill to be completed.          Additional  Information About Your Visit        [facility] Information     [facility] gives you secure access to your electronic health record. If you see a primary care provider, you can also send messages to your care team and make appointments. If you have questions, please call your primary care clinic.  If you do not have a primary care provider, please call [phone] and they will assist you.        Care EveryWhere ID     This is your Care EveryWhere ID. This could be used by other organizations to access your [facility] medical records  [id number]        Your Vitals Were     BMI (Body Mass Index)                   31.58 kg/m2            Blood Pressure from Last 3 Encounters:   04/11/17 127/74   03/08/17 130/70   02/17/17 127/72    Weight from Last 3 Encounters:   04/18/17 90.7 kg (200 lb)   04/11/17 93.4 kg (205 lb 14.4 oz)   03/22/17 93.5 kg (206 lb 3.2 oz)              Today, you had the following     No orders found for display         Today's Medication Changes          These changes are accurate as of: 4/18/17  4:15 PM.  If you have any questions, ask your nurse or doctor.               These medicines have changed or have updated prescriptions.        Dose/Directions    acyclovir 5 % ointment   Commonly known as:  ZOVIRAX   This may have changed:  additional instructions   Used for:  Recurrent cold sores        Apply topically 6 times daily   Quantity:  15 g   Refills:  3       fluticasone 50 MCG/ACT spray   Commonly known as:  FLONASE   This may have changed:    - when to take this  - reasons to take this   Used for:  Seasonal allergic rhinitis        Dose:  1-2 spray   Spray 1-2 sprays into both nostrils daily   Quantity:  16 g   Refills:  5       PARoxetine 10 MG tablet   Commonly known as:  PAXIL   This may have changed:    - how much to take  - how to take this  - when to take this  - additional instructions   Used for:  Major depressive disorder, recurrent episode, moderate (H)        TAKE 1 TABLET (10mg) BY  MOUTH AT BEDTIME   Quantity:  30 tablet   Refills:  1                Primary Care Provider Office Phone # Fax #    Ilene Tristan [phone]       [facility] [address]  [facility] [address]        Thank you!     Thank you for choosing [facility]  for your care. Our goal is always to provide you with excellent care. Hearing back from our patients is one way we can continue to improve our services. Please take a few minutes to complete the written survey that you may receive in the mail after your visit with us. Thank you!             Your Updated Medication List - Protect others around you: Learn how to safely use, store and throw away your medicines at www.disposemymeds.org.          This list is accurate as of: 4/18/17  4:15 PM.  Always use your most recent med list.                   Brand Name Dispense Instructions for use    acyclovir 400 MG tablet    ZOVIRAX     Take 400 mg by mouth See Admin Instructions 5 times daily as needed for outbreaks       acyclovir 5 % ointment    ZOVIRAX    15 g    Apply topically 6 times daily       albuterol 108 (90 BASE) MCG/ACT Inhaler    PROAIR HFA/PROVENTIL HFA/VENTOLIN HFA    1 Inhaler    Inhale 2 puffs into the lungs every 6 hours       atorvastatin 40 MG tablet    LIPITOR    30 tablet    Take 1 tablet (40 mg) by mouth daily       azithromycin 250 MG tablet    ZITHROMAX    30 tablet    Take 1 tablet (250 mg) by mouth daily       COMPRESSION STOCKINGS     2 each    Wear compression stockings in affected leg (right leg) or both legs most of the time during the day and take them off at night.       fluticasone 220 MCG/ACT Inhaler    FLOVENT HFA    3 Inhaler    Inhale 2 puffs into the lungs 2 times daily       fluticasone 50 MCG/ACT spray    FLONASE    16 g    Spray 1-2 sprays into both nostrils daily       furosemide 40 MG tablet    LASIX    180 tablet    Take 1 tablet (40 mg) by mouth 2 times daily        guaiFENesin-dextromethorphan 100-10 MG/5ML syrup    ROBITUSSIN DM    473 mL    Take 10 mLs by mouth every 4 hours as needed for cough       insulin aspart 100 UNIT/ML injection    NovoLOG PEN    3 mL    Inject 10 Units Subcutaneous 3 times daily (with meals)       insulin glargine 100 UNIT/ML injection    LANTUS    3 mL    Inject 23 Units Subcutaneous every morning (before breakfast)       ketoconazole 2 % cream    NIZORAL    60 g    Apply topically 2 times daily       lactobacillus rhamnosus (GG) capsule     18 capsule    Take 1 capsule by mouth 3 times daily (before meals)       levofloxacin 500 MG tablet    LEVAQUIN    2 tablet    Take 1 tablet (500 mg) by mouth daily       lisinopril 2.5 MG tablet    PRINIVIL/Zestril    30 tablet    Take 1 tablet (2.5 mg) by mouth daily       metFORMIN 500 MG 24 hr tablet    GLUCOPHAGE-XR    180 tablet    Take 2 tablets (1,000 mg) by mouth daily (with dinner)       metoprolol 100 MG 24 hr tablet    TOPROL-XL    90 tablet    Take 1 tablet (100 mg) by mouth daily       montelukast 10 MG tablet    SINGULAIR    90 tablet    Take 1 tablet (10 mg) by mouth At Bedtime       * order for DME     1 Device    Oxygen: Patient requires supplemental Oxygen 4 LPM via nasal canula with activity. Please provide patient with a home unit and with portability capability. Oxygen will be for a lifetime.       * order for DME     1 Device    Oxygen: Patient requires supplemental Oxygen 4 LPM via nasal canula with activity. Please provide patient with POC to improve mobility, okay to titrate for conserving to keep stats above 90%. Please fax results to [phone].  Oxygen will be for a lifetime.       pantoprazole 20 MG EC tablet    PROTONIX    60 tablet    Take 1-2 tablets (20-40 mg) by mouth daily       PARoxetine 10 MG tablet    PAXIL    30 tablet    TAKE 1 TABLET (10mg) BY MOUTH AT BEDTIME       polyethylene glycol Packet    MIRALAX/GLYCOLAX    7 packet    Take 17 g by mouth daily as needed for  constipation       potassium chloride SA 20 MEQ CR tablet    K-DUR/KLOR-CON M    90 tablet    Take 1 tablet (20 mEq) by mouth daily       predniSONE 10 MG tablet    DELTASONE    90 tablet    Take 2 tablets for three days (4/12-14), then take 1 tablet daily (10 mg daily starting 4/15)       spironolactone 25 MG tablet    ALDACTONE    90 tablet    Take 1 tablet (25 mg) by mouth daily       traZODone 50 MG tablet    DESYREL    60 tablet    TAKE 1/2-1 TABLET BY MOUTH NIGHTLY AS NEEDED, CAN TITRATE DOWN TO 1/2TAB OR UP TO 2TABS AS NEEDED       TYLENOL 500 MG tablet   Generic drug:  acetaminophen      Take 500 mg by mouth nightly as needed       warfarin 7.5 MG tablet    COUMADIN    100 tablet    Current dose is 7.5 mg daily.  Dose adjusted per INR result.       * Notice:  This list has 2 medication(s) that are the same as other medications prescribed for you. Read the directions carefully, and ask your doctor or other care provider to review them with you.

## 2017-04-18 NOTE — PROGRESS NOTES
Diabetes Self Management Training: Initial Assessment Visit for Newly Diagnosed Patients (Complete AADE Goals Flowsheet)    Betty Tee presents today for education related to Type 2 diabetes.    She is accompanied by friend, Nghia and roommate, Yvette.    Patient's diabetes management related comments/concerns: the diagnosis makes sense given her unquenchable thirst, Yvette has concerns about Betty remembering all this    Patient's emotional response to diabetes: expresses readiness to learn and concern for health and well-being    Patient would like this visit to be focused around the following diabetes-related behaviors and goals: Assistance with making lifestyle changes    ASSESSMENT:  Patient Problem List and Family Medical History reviewed for relevant medical history, current medical status, and diabetes risk factors.    Current Diabetes Management per Patient:  Taking diabetes medications?   yes:     Diabetes Medication(s)     Biguanides Sig    metFORMIN (GLUCOPHAGE-XR) 500 MG 24 hr tablet Take 2 tablets (1,000 mg) by mouth daily (with dinner)    Insulin Sig    insulin glargine (LANTUS) 100 UNIT/ML injection Inject 23 Units Subcutaneous every morning (before breakfast)    insulin aspart (NOVOLOG PEN) 100 UNIT/ML injection Inject 10 Units Subcutaneous 3 times daily (with meals)          *Abbreviated insulin dose documentation key: Insulin Trade Name (yqibrquto-rtlth-ajeogx-bedtime) - i.e. Humalog 5-5-5-0 (Humalog 5 units at breakfast, 5 units at lunch, and 5 units at dinner).    Past Diabetes Education: Yes    Patient glucose self monitoring as follows: four times daily.   BG meter: Accu-Chek Amanda Connect meter--could not download  BG results: blood sugars are rising throughout the day    BG values are: Not in goal  Patient's most recent   Lab Results   Component Value Date    A1C 14.3 04/04/2017    is not meeting goal of <8.0    Nutrition:  Patient eats 3 meals per day    Breakfast - (`10am) poached eggs  "and 2 toast with broussard or oatmea with 1-2l 2 toast, coffee  Lunch - (1p) peanut butter with sf jelly sandwich, coffee  Dinner - (5:30-6p) pork chops and apples or pork loin with squash  Snacks - unsweetened applesauce and sf cookie    Beverages: Juice orange/day, Coffee 3-31/2/day and Water 8/day    Cultural/Rastafari diet restrictions: Yes     Biggest Challenge to Healthy Eating: \"I like to eat\", concerned about putting too much on Yvette    Physical Activity:    Type: shopping or running errands  Frequency: sporadically days/week      Limitations: breathing    Diabetes Risk Factors:  family history, age over 45 years, overweight/obesity and inactivity    Diabetes Complications:  Acute Complications: At risk for hypoglycemia? yes  Symptoms of low blood sugar? sweating, shaking and weakness  Frequency of hypoglycemia: rare  Patient carries a carbohydrate source with them regularly: Yes   Type of carbohydrate: glucose tablets    Symptoms of hyperglycemia? increased thirst, frequent urination, headache, blurred vision and feeling drowsy/tired    Vitals:  There were no vitals taken for this visit.  Estimated body mass index is 32.51 kg/(m^2) as calculated from the following:    Height as of 3/22/17: 1.695 m (5' 6.73\").    Weight as of 4/11/17: 93.4 kg (205 lb 14.4 oz).   Last 3 BP:   BP Readings from Last 3 Encounters:   04/11/17 127/74   03/08/17 130/70   02/17/17 127/72       History   Smoking Status     Former Smoker     Packs/day: 0.50     Years: 10.00     Types: Cigarettes     Quit date: 8/1/2011   Smokeless Tobacco     Never Used     Comment: 1/2 ppd       Labs:  Lab Results   Component Value Date    A1C 14.3 04/04/2017     Lab Results   Component Value Date    GLC 91 04/11/2017     Lab Results   Component Value Date    LDL 56 09/20/2016     HDL Cholesterol   Date Value Ref Range Status   09/20/2016 72 >49 mg/dL Final   ]  GFR Estimate   Date Value Ref Range Status   04/11/2017 82 >60 mL/min/1.7m2 Final     " Comment:     Non  GFR Calc     GFR Estimate If Black   Date Value Ref Range Status   04/11/2017 >90   GFR Calc   >60 mL/min/1.7m2 Final     Lab Results   Component Value Date    CR 0.69 04/11/2017     No results found for: MICROALBUMIN    Socio/Economic Considerations:    Support system: friend(s)    Health Beliefs and Attitudes:   Patient Activation Measure Survey Score:  ELY Score (Last Two) 10/11/2013   ELY Raw Score 41   Activation Score 63.2   ELY Level 3       Stage of Change: ACTION (Actively working towards change)      Diabetes knowledge and skills assessment:     Patient is knowledgeable in diabetes management concepts related to: Taking Medication    Patient needs further education on the following diabetes management concepts: Healthy Eating, Being Active, Monitoring and Taking Medication, Problem-solving, Healthy Coping    Barriers to Learning Assessment: No Barriers identified    Based on learning assessment above, most appropriate setting for further diabetes education would be: Individual setting.    INTERVENTION:   We discussed the plate method for meal planning as they are overwhelmed.    Blood sugar rising throughout the day.  Will increase base dose of Novolog before meals.    Some meter teaching done.      Education provided today on:  AADE Self-Care Behaviors:  Healthy Eating: plate planning method  Monitoring: log and interpret results, individual blood glucose targets and frequency of monitoring  Taking Medication: side effects of prescribed medications and when to take medications  Problem Solving: high blood glucose - causes, signs/symptoms, treatment and prevention, low blood glucose - causes, signs/symptoms, treatment and prevention, carrying a carbohydrate source at all times and when to call health care provider    Opportunities for ongoing education and support in diabetes-self management were discussed.    Pt verbalized understanding of concepts  discussed and recommendations provided today.       Education Materials Provided:  BG Log Sheet    PLAN:  See Patient Instructions for co-developed, patient-stated behavior change goals.  AVS printed and provided to patient today.    FOLLOW-UP:  Chart routed to referring provider.    Ongoing plan for education and support: follow up education 1 week    Time Spent: 60 minutes  Encounter Type: Individual    Any diabetes medication dose changes were made via the CDE Protocol and Collaborative Practice Agreement with the patient's referring provider. A copy of this encounter was shared with the provider.

## 2017-04-19 ENCOUNTER — TELEPHONE (OUTPATIENT)
Dept: FAMILY MEDICINE | Facility: CLINIC | Age: 77
End: 2017-04-19
Payer: MEDICARE

## 2017-04-19 ENCOUNTER — AMBULATORY - HEALTHEAST (OUTPATIENT)
Dept: GERIATRICS | Facility: CLINIC | Age: 77
End: 2017-04-19

## 2017-04-19 DIAGNOSIS — Z79.01 LONG TERM CURRENT USE OF ANTICOAGULANT THERAPY: Primary | ICD-10-CM

## 2017-04-19 DIAGNOSIS — E11.9 TYPE 2 DIABETES MELLITUS WITHOUT COMPLICATION, WITHOUT LONG-TERM CURRENT USE OF INSULIN (H): Primary | ICD-10-CM

## 2017-04-19 LAB — INR POINT OF CARE: 3.3 (ref 0.86–1.14)

## 2017-04-19 PROCEDURE — 36416 COLLJ CAPILLARY BLOOD SPEC: CPT | Performed by: FAMILY MEDICINE

## 2017-04-19 PROCEDURE — 99207 ZZC NO CHARGE NURSE ONLY: CPT | Performed by: FAMILY MEDICINE

## 2017-04-19 PROCEDURE — 85610 PROTHROMBIN TIME: CPT | Mod: QW | Performed by: FAMILY MEDICINE

## 2017-04-19 RX ORDER — LANCETS
EACH MISCELLANEOUS
Qty: 1 BOX | Refills: 11 | Status: SHIPPED | OUTPATIENT
Start: 2017-04-19 | End: 2020-02-17

## 2017-04-19 NOTE — TELEPHONE ENCOUNTER
ANTICOAGULATION FOLLOW-UP CLINIC VISIT    Patient Name:  Betty Tee  Date:  4/19/2017  Contact Type:  Telephone    SUBJECTIVE:  Bleeding Signs/Symptoms: None  Thromboembolic Signs/Symptoms: None    Medication Changes:  Yes: Hospitalized from 4/2/2017-4/11/2017 then went to TCU, when discharged from hospital, started on: Atorvastatin, Robitussin DM, Novolog, Lantus, Lactobacillus, Levaquin, Lisinopril, and Potassium (new diabetic)  Dietary Changes:  None  Bacterial/Viral Infection:  Started on Levaquin in the hospital    Missed Coumadin Doses:  None  Other Concerns:  No      ASSESSMENT/PLAN:  See: ANTICOAGULATION QIC flow sheet.    INR 3.3  Per B Kanchan INR nurse, pt to take:   5mg tonight, 7.5mg Th, F, Sa, and Javier  Recheck INR Monday 4/24/2017  Homecare nurse Liseth informed and repeated directions back    JIMI VOGT

## 2017-04-19 NOTE — TELEPHONE ENCOUNTER
Reason for Call:  INR    Who is calling?  Home Care: [facility]    Phone number:  [phone]    Name of caller: Liseth    INR Value:  3.3 via finger stick    Are there any other concerns:  No    Can we leave a detailed message on this number? YES    Call taken on 4/19/2017 at 3:27 PM by Kami Shannon

## 2017-04-19 NOTE — TELEPHONE ENCOUNTER
Reason for Call:  Medication or medication refill:    Do you use a [facility]?  Name of the pharmacy and phone number for the current request:  [id number] - [place], [place] - [address]      Name of the medication requested: needles for Novolog taken 3 times daily  And for Lantus taken 1 daily also needs Lancets to check her blood sugar      Other request: needed asap    Can we leave a detailed message on this number? YES    Phone number patient can be reached at: [phone]    Best Time:     Call taken on 4/19/2017 at 10:51 AM by Lesa Leone

## 2017-04-19 NOTE — TELEPHONE ENCOUNTER
CW,  Spoke with Nghia Carlson (patient's healthcare agent)  She states pt diagnosed with diabetes in hospital, but was sent home from TCU without needles, lancets, and test strips Rx's.  Requesting Rx's for these from you.  Orders pended if you approve.    Has upcoming hospital f/u appt with you; 4/28/2017 at 130pm for 45 minutes.  Tried to move this up sooner, but Nghia will be coming with pt and she is out of town from 4/21-4/25.  Lydia HALEY RN

## 2017-04-20 ENCOUNTER — DOCUMENTATION ONLY (OUTPATIENT)
Dept: CARE COORDINATION | Facility: CLINIC | Age: 77
End: 2017-04-20

## 2017-04-20 ENCOUNTER — TELEPHONE (OUTPATIENT)
Dept: FAMILY MEDICINE | Facility: CLINIC | Age: 77
End: 2017-04-20

## 2017-04-20 DIAGNOSIS — K21.9 GASTROESOPHAGEAL REFLUX DISEASE WITHOUT ESOPHAGITIS: ICD-10-CM

## 2017-04-20 NOTE — TELEPHONE ENCOUNTER
Reason for call: [facility] pharmacy requesting clarification of which Pantoprazole strength and frequency.  TCU rx'd med 4/2017 20mg, 1 tablet daily.  CW rx'd med 12/2016 40mg, 1 tablet daily.    [facility] phone: [phone]  Best Time: Anytime  Can we leave a detailed message on this number? Yes

## 2017-04-20 NOTE — TELEPHONE ENCOUNTER
CW,  Please see below [facility]  Reviewed hospital d/c summary which states 20-40mg (1-2 tabs) daily  Last Rx from you 3/8/2017 (not 12/2016 as listed below); this Rx also stated 20-40mg daily  Please advise/confirm what dose/directions you want pt on  Thank you,  Lydia HALEY RN

## 2017-04-20 NOTE — PROGRESS NOTES
[facility] now requests orders and shares plan of care/discharge summaries for some patients through [employer].  Please REPLY TO THIS MESSAGE in order to give authorization for orders when needed.  This is considered a verbal order, you will still receive a faxed copy of orders for signature.  Thank you for your assistance in improving collaboration for our patients.    ORDER  SN 2 times a week for 2 weeks, 1 time a week for 3 weeks, 3 PRN    MD SUMMARY/PLAN OF CARE

## 2017-04-21 RX ORDER — PANTOPRAZOLE SODIUM 40 MG/1
40 TABLET, DELAYED RELEASE ORAL DAILY
Qty: 90 TABLET | Refills: 1 | Status: SHIPPED | OUTPATIENT
Start: 2017-04-21 | End: 2017-09-12 | Stop reason: DRUGHIGH

## 2017-04-24 ENCOUNTER — TELEPHONE (OUTPATIENT)
Dept: EDUCATION SERVICES | Facility: CLINIC | Age: 77
End: 2017-04-24

## 2017-04-24 NOTE — TELEPHONE ENCOUNTER
Call to check in with Betty and see where her blood sugar is at.  She states that her fasting blood sugar was 133 this morning.   She was 136 before lunch.  She is feeling better.  She feels like they are doing a good job navigating the meal plan and she has been working outside.    Advised to call if the numbers start going up.  She has pcp follow up Friday.    Advised to schedule with Johanna Freitas RD for ongoing diabetes education. Martha Lenz RN,CDE

## 2017-04-25 ENCOUNTER — TELEPHONE (OUTPATIENT)
Dept: FAMILY MEDICINE | Facility: CLINIC | Age: 77
End: 2017-04-25
Payer: MEDICARE

## 2017-04-25 DIAGNOSIS — I10 ESSENTIAL HYPERTENSION, MALIGNANT: ICD-10-CM

## 2017-04-25 DIAGNOSIS — I50.9 ACUTE ON CHRONIC HEART FAILURE, UNSPECIFIED HEART FAILURE TYPE (H): ICD-10-CM

## 2017-04-25 DIAGNOSIS — Z79.01 LONG TERM CURRENT USE OF ANTICOAGULANT THERAPY: Primary | ICD-10-CM

## 2017-04-25 DIAGNOSIS — K21.9 GASTROESOPHAGEAL REFLUX DISEASE WITHOUT ESOPHAGITIS: ICD-10-CM

## 2017-04-25 DIAGNOSIS — E11.69 TYPE 2 DIABETES MELLITUS WITH OTHER SPECIFIED COMPLICATION (H): ICD-10-CM

## 2017-04-25 DIAGNOSIS — J18.9 PNEUMONIA DUE TO INFECTIOUS ORGANISM, UNSPECIFIED LATERALITY, UNSPECIFIED PART OF LUNG: ICD-10-CM

## 2017-04-25 PROCEDURE — 99207 ZZC NO CHARGE NURSE ONLY: CPT | Performed by: FAMILY MEDICINE

## 2017-04-25 RX ORDER — ATORVASTATIN CALCIUM 40 MG/1
TABLET, FILM COATED ORAL
Refills: 0
Start: 2017-04-25

## 2017-04-25 RX ORDER — POTASSIUM CHLORIDE 1500 MG/1
TABLET, EXTENDED RELEASE ORAL
Refills: 0
Start: 2017-04-25

## 2017-04-25 RX ORDER — ATORVASTATIN CALCIUM 40 MG/1
40 TABLET, FILM COATED ORAL DAILY
Qty: 30 TABLET | Refills: 1 | Status: CANCELLED | OUTPATIENT
Start: 2017-04-25

## 2017-04-25 RX ORDER — LISINOPRIL 2.5 MG/1
TABLET ORAL
Refills: 0
Start: 2017-04-25

## 2017-04-25 RX ORDER — POTASSIUM CHLORIDE 1500 MG/1
20 TABLET, EXTENDED RELEASE ORAL DAILY
Qty: 90 TABLET | Refills: 0 | Status: CANCELLED | OUTPATIENT
Start: 2017-04-25

## 2017-04-25 RX ORDER — LACTOBACILLUS RHAMNOSUS GG 10B CELL
1 CAPSULE ORAL
Qty: 90 CAPSULE | Refills: 1 | Status: SHIPPED | OUTPATIENT
Start: 2017-04-25 | End: 2017-10-06

## 2017-04-25 RX ORDER — LISINOPRIL 2.5 MG/1
2.5 TABLET ORAL DAILY
Qty: 30 TABLET | Refills: 0 | Status: CANCELLED | OUTPATIENT
Start: 2017-04-25

## 2017-04-25 NOTE — TELEPHONE ENCOUNTER
Lisinopril      Last Written Prescription Date: 4-11-17  Last Fill Quantity: 30, # refills: 0  Last Office Visit with [facility], [facility] or  Health prescribing provider: PCP: 3-8-17  And  [facility] 3-28-17  Next 5 appointments (look out 90 days)     Apr 28, 2017  1:30 PM CDT   Office Visit with Ilene Tristan MD   [facility] ([facility])    [address] [facility] [address]   [phone]                   Potassium   Date Value Ref Range Status   04/11/2017 3.6 3.4 - 5.3 mmol/L Final     Creatinine   Date Value Ref Range Status   04/11/2017 0.69 0.52 - 1.04 mg/dL Final     BP Readings from Last 3 Encounters:   04/11/17 127/74   03/08/17 130/70   02/17/17 127/72     Atorvastatin     Last Written Prescription Date: 4-11-17  Last Fill Quantity: 30, # refills: 1  Last Office Visit with [facility], P or  Health prescribing provider: PCP: 3-8-17  And  [facility] 3-28-17  Next 5 appointments (look out 90 days)     Apr 28, 2017  1:30 PM CDT   Office Visit with Ilene Tristan MD   [facility] ([facility])    [address] [address] [facility] [id number] [phone]                   Lab Results   Component Value Date    CHOL 153 09/20/2016     Lab Results   Component Value Date    HDL 72 09/20/2016     Lab Results   Component Value Date    LDL 56 09/20/2016     Lab Results   Component Value Date    TRIG 123 09/20/2016     Lab Results   Component Value Date    CHOLHDLRATIO 2.5 08/06/2014     Klor-Con      Last Written Prescription Date:  4-11-17  Last Fill Quantity: 90,   # refills: 0  Last Office Visit with [facility], P or  Health prescribing provider: PCP: 3-8-17  And  [facility] 3-28-17  Future Office visit:    Next 5 appointments (look out 90 days)     Apr 28, 2017  1:30 PM CDT   Office Visit with Ilene Tristan MD   [facility] ([facility])    [address] [place] [address]  [facility] [address] [phone]                    Routing refill request to provider for review/approval because:  Drug not on the [facility], [facility] or [facility] refill protocol or controlled substance

## 2017-04-25 NOTE — TELEPHONE ENCOUNTER
ANTICOAGULATION FOLLOW-UP CLINIC VISIT    Patient Name:  Betty Tee  Date:  4/25/2017  Contact Type:  Telephone. Dose instructions given to Oc from home care ([phone])    SUBJECTIVE:  Bleeding Signs/Symptoms: None  Thromboembolic Signs/Symptoms: None    Medication Changes:  No  Dietary Changes:  No  Bacterial/Viral Infection:  No    Missed Coumadin Doses:  None  Other Concerns:  No      ASSESSMENT/PLAN:  See: ANTICOAGULATION QIC flow sheet.    INR 3.1  Plan: 5 mg Tu and 7.5 mg rest of week = 50 mg/wk. Recheck INR in 1 week.  Sophia Hemphill RN        Dosage adjustment made based on physician directed care plan.    JIMI VOGT

## 2017-04-25 NOTE — TELEPHONE ENCOUNTER
Reason for Call:  INR  Who is calling?  Home Care:     Phone number:  [phone]    Fax number:      Name of caller: Oc    INR Value:  3.1    Are there any other concerns:  Yes: next dose+recheck date needed    Can we leave a detailed message on this number? YES        Call taken on 4/25/2017 at 11:33 AM by Lesa Leone

## 2017-04-25 NOTE — TELEPHONE ENCOUNTER
Reason for Call:  Medication or medication refill:    Do you use a [facility]?  Name of the pharmacy and phone number for the current request:  [facility] #[id number] - [place], [place] - [address]      Name of the medication requested: potassium chloride SA (K-DUR/KLOR-CON M) 20 MEQ CR tablet  lisinopril (PRINIVIL/ZESTRIL) 2.5 MG tablet  lactobacillus rhamnosus, GG, (CULTURELL) capsule  atorvastatin (LIPITOR) 40 MG tablet    Other request:     Can we leave a detailed message on this number? YES    Phone number patient can be reached at: Home number on file [phone] (home)    Best Time: anytime    Call taken on 4/25/2017 at 1:07 PM by Kami Shannon

## 2017-04-25 NOTE — TELEPHONE ENCOUNTER
Culturelle Rx faxed to pharmacy  Left detailed VM for pt informing Culturelle faxed and should have enough of the rest of meds until upcoming OV  Advised in VM she callback with questions/concerns.  Lydia HALEY RN

## 2017-04-25 NOTE — TELEPHONE ENCOUNTER
CW  Next 5 appointments (look out 90 days)     Apr 28, 2017  1:30 PM CDT   Office Visit with Ilene Tristan MD   [facility] ([facility]    [address] [facility] [address] [phone]                Pt should have enough medication (see below meds) until she sees you except for the Culturelle, did you want to fill this now or wait until OV?  Thank you,  Kerlien Leo RN

## 2017-04-27 ENCOUNTER — TELEPHONE (OUTPATIENT)
Dept: FAMILY MEDICINE | Facility: CLINIC | Age: 77
End: 2017-04-27

## 2017-04-27 NOTE — TELEPHONE ENCOUNTER
Reason for Call:  Other weight gain    Detailed comments: lyric is reporting weight of 197lbs yesterday and today 201lbs vitals at bp 134/80 p 74 asp 16 temp 98.5 ox level 97% lung sound is cleared. Call back with questions.    Phone Number Patient can be reached at: Other phone number:  [phone]    Best Time: any    Can we leave a detailed message on this number? YES    Call taken on 4/27/2017 at 12:25 PM by Flex Lopez

## 2017-04-27 NOTE — TELEPHONE ENCOUNTER
KP'brock BATES who said pt was seeing CHF specialist.  Weight gain should be reported to specialist.  Left detailed VM for Kirk, homecare nurse  Lydia HALEY RN

## 2017-04-28 ENCOUNTER — OFFICE VISIT (OUTPATIENT)
Dept: FAMILY MEDICINE | Facility: CLINIC | Age: 77
End: 2017-04-28
Payer: MEDICARE

## 2017-04-28 VITALS
SYSTOLIC BLOOD PRESSURE: 102 MMHG | OXYGEN SATURATION: 100 % | HEIGHT: 67 IN | BODY MASS INDEX: 31.95 KG/M2 | DIASTOLIC BLOOD PRESSURE: 66 MMHG | TEMPERATURE: 97.5 F | HEART RATE: 107 BPM | WEIGHT: 203.56 LBS

## 2017-04-28 DIAGNOSIS — J81.0 ACUTE EDEMA OF LUNG (H): ICD-10-CM

## 2017-04-28 DIAGNOSIS — E11.9 TYPE 2 DIABETES MELLITUS WITHOUT COMPLICATION, WITH LONG-TERM CURRENT USE OF INSULIN (H): ICD-10-CM

## 2017-04-28 DIAGNOSIS — E11.69 TYPE 2 DIABETES MELLITUS WITH OTHER SPECIFIED COMPLICATION (H): ICD-10-CM

## 2017-04-28 DIAGNOSIS — G47.33 OSA (OBSTRUCTIVE SLEEP APNEA): ICD-10-CM

## 2017-04-28 DIAGNOSIS — J84.9 ILD (INTERSTITIAL LUNG DISEASE) (H): ICD-10-CM

## 2017-04-28 DIAGNOSIS — Z79.4 TYPE 2 DIABETES MELLITUS WITHOUT COMPLICATION, WITH LONG-TERM CURRENT USE OF INSULIN (H): ICD-10-CM

## 2017-04-28 DIAGNOSIS — I50.30 (HFPEF) HEART FAILURE WITH PRESERVED EJECTION FRACTION (H): ICD-10-CM

## 2017-04-28 DIAGNOSIS — I48.91 ATRIAL FIBRILLATION WITH CONTROLLED VENTRICULAR RESPONSE (H): Primary | ICD-10-CM

## 2017-04-28 DIAGNOSIS — I10 ESSENTIAL HYPERTENSION WITH GOAL BLOOD PRESSURE LESS THAN 140/90: ICD-10-CM

## 2017-04-28 DIAGNOSIS — J96.21 ACUTE AND CHRONIC RESPIRATORY FAILURE WITH HYPOXIA (H): ICD-10-CM

## 2017-04-28 DIAGNOSIS — M35.00 SJOGREN'S SYNDROME (H): ICD-10-CM

## 2017-04-28 DIAGNOSIS — I50.9 ACUTE ON CHRONIC HEART FAILURE, UNSPECIFIED HEART FAILURE TYPE (H): ICD-10-CM

## 2017-04-28 LAB
BASOPHILS # BLD AUTO: 0.1 10E9/L (ref 0–0.2)
BASOPHILS NFR BLD AUTO: 0.5 %
DIFFERENTIAL METHOD BLD: ABNORMAL
EOSINOPHIL # BLD AUTO: 0.2 10E9/L (ref 0–0.7)
EOSINOPHIL NFR BLD AUTO: 2.3 %
ERYTHROCYTE [DISTWIDTH] IN BLOOD BY AUTOMATED COUNT: 19.8 % (ref 10–15)
HCT VFR BLD AUTO: 33.1 % (ref 35–47)
HGB BLD-MCNC: 9.3 G/DL (ref 11.7–15.7)
LYMPHOCYTES # BLD AUTO: 1.8 10E9/L (ref 0.8–5.3)
LYMPHOCYTES NFR BLD AUTO: 19 %
MCH RBC QN AUTO: 21 PG (ref 26.5–33)
MCHC RBC AUTO-ENTMCNC: 28.1 G/DL (ref 31.5–36.5)
MCV RBC AUTO: 75 FL (ref 78–100)
MONOCYTES # BLD AUTO: 1.3 10E9/L (ref 0–1.3)
MONOCYTES NFR BLD AUTO: 13.3 %
NEUTROPHILS # BLD AUTO: 6.1 10E9/L (ref 1.6–8.3)
NEUTROPHILS NFR BLD AUTO: 64.9 %
PLATELET # BLD AUTO: 296 10E9/L (ref 150–450)
RBC # BLD AUTO: 4.42 10E12/L (ref 3.8–5.2)
WBC # BLD AUTO: 9.4 10E9/L (ref 4–11)

## 2017-04-28 PROCEDURE — 36415 COLL VENOUS BLD VENIPUNCTURE: CPT | Performed by: FAMILY MEDICINE

## 2017-04-28 PROCEDURE — 80048 BASIC METABOLIC PNL TOTAL CA: CPT | Performed by: FAMILY MEDICINE

## 2017-04-28 PROCEDURE — 85025 COMPLETE CBC W/AUTO DIFF WBC: CPT | Performed by: FAMILY MEDICINE

## 2017-04-28 PROCEDURE — 99495 TRANSJ CARE MGMT MOD F2F 14D: CPT | Performed by: FAMILY MEDICINE

## 2017-04-28 RX ORDER — ATORVASTATIN CALCIUM 40 MG/1
40 TABLET, FILM COATED ORAL DAILY
Qty: 90 TABLET | Refills: 1 | Status: SHIPPED | OUTPATIENT
Start: 2017-04-28 | End: 2017-08-09

## 2017-04-28 RX ORDER — LISINOPRIL 2.5 MG/1
2.5 TABLET ORAL DAILY
Qty: 90 TABLET | Refills: 1 | Status: SHIPPED | OUTPATIENT
Start: 2017-04-28 | End: 2017-10-14

## 2017-04-28 RX ORDER — POTASSIUM CHLORIDE 1500 MG/1
20 TABLET, EXTENDED RELEASE ORAL DAILY
Qty: 90 TABLET | Refills: 0 | Status: SHIPPED | OUTPATIENT
Start: 2017-04-28 | End: 2017-06-22

## 2017-04-28 RX ORDER — FUROSEMIDE 40 MG
20 TABLET ORAL 2 TIMES DAILY
Qty: 180 TABLET | Refills: 3 | COMMUNITY
Start: 2017-04-28 | End: 2017-05-31

## 2017-04-28 NOTE — NURSING NOTE
"Chief Complaint   Patient presents with     Hospital F/U     /66 (BP Location: Left arm, Patient Position: Left side, Cuff Size: Adult Large)  Pulse 107  Temp 97.5  F (36.4  C) (Tympanic)  Ht 5' 6.73\" (1.695 m)  Wt 203 lb 9 oz (92.3 kg)  SpO2 100%  Breastfeeding? No  BMI 32.14 kg/m2 Estimated body mass index is 32.14 kg/(m^2) as calculated from the following:    Height as of this encounter: 5' 6.73\" (1.695 m).    Weight as of this encounter: 203 lb 9 oz (92.3 kg).  bp completed using cuff size: large      Health Maintenance addressed:  NONE    n/a              "

## 2017-04-28 NOTE — PROGRESS NOTES
SUBJECTIVE:                                                    Betty Tee is a 76 year old female who presents to clinic today for the following health issues:    Hospital Follow-up Visit:    Hospital/Nursing Home/IP Rehab Facility: [facility]  Date of Admission: 04/02/2017  Date of Discharge: 04/11/2017, TCU from 4/11/17-4/18/17  Reason(s) for Admission: SOB, acute  Heart failure             Problems taking medications regularly:  None       Medication changes since discharge:  Diabetic medications       Problems adhering to non-medication therapy:  None    ---Hospitalization for pneumonia and influenza B and acute on chronic respiratory failure-  Has cough sx's for 1-2 weeks before admission.  Pt had been in bed the day prior and the day of hospitalization.  Friend Yvette came home at 8pm, found her on the toilet, not willing to move.  Called ambulance, admitted ~3am.    Admitted with cough, dizziness, resp failure due to pulmonary congestion.  In ICU for ~4 days (thur-sun), then transitional room, d/c Tues to TCU.    In hospital, txt with prednisone- 40mg and frequent nebs, and abx.  Cardiac w/u- VERONICA showed anteroseptal wall motion, but cardiology thought that was most likely due to a.fib with RVR, no further w/u/medical management was needed.  TCU ([facility]), Tues-Tues (1 wk), home on the 18th.  Home health care nurse is coming to the home- 1-2x/wk.  Setting up meds weekly.    Potassium chloride- now on 20meq daily- will recheck K.      ---DM also significantly worsened in hospital- A1C went up from 9.4 to 14's in the hospital.  Put on lantus 23 units qam, novolog 12 units with each meal, plus sliding scale (more in the beginning, less in the last few days).    DM- #'s recently-  Tues 11am-148, 4pm- 170, 6:30p- 206, 11pm 247  Wed- 9am- 130, 1pm 180, 6pm- 103, 11pm 210  Thur- 9am- 150, 12:15pm- 175, 5:30p- 223, 10:45pm- 132  Friday- 9am 116, 1pm 145    DM educator visit  (Martha)- great visit and follow-up, though she is   She has changed her diet- if cereal, no sugar.  Mostly eliminated added sugar.  Tuna sandwich, stopped drinking juice (had been unusually drinking ice water and smoothies the hospitalization for last few months).  Lots more fruits and vegetables.  More fish.  Careful about portions.  Has been 'very good'- thinks it's sustainable.  Down ~30 lbs since dx of DM.      Now at home, doing well.   Energy is still down.  Walking from point A to B, hurts across lower chest.  Totally off oxygen.  Did some PT at [facility], did okay without oxygen.  Has lots of tanks at home.    Sjogrens- dry mouth, and dry eyes- hasn't been having any of those sx's either.  No more mouth sores.  Inflammatory joint disease sx's have also resolved.  Prednisone 40mg for most of hospital stay, tapered down at [facility]    MDD- At last visit- decreased paxil to 10mg/d.  Thinks it's fine.      Med changes-   Lasix 40mg BID on d/c summary, 20mg BID on TCU summary.  - taking 20mg BID now.  Will cont.    D/c levaquin  Lactobacillus prn  Check labs and assess potassium  lipitor started in hospital- recheck lipids at next visit  Lasix is being taken at 20mg BID (down from 40mg BID at d/c), but she seems to be doing fine on lower dose.  Will monitor.    6/22/17 - cardiology visit with Dr. Rosa.    Summary of hospitalization:  [facility] discharge summary reviewed  See outside records, reviewed and scanned  Diagnostic Tests/Treatments reviewed.  Follow up needed: potassium recheck  Other Healthcare Providers Involved in Patient s Care:         Specialist appointment - Pulmonary and Cardiology  Update since discharge: improved.     Post Discharge Medication Reconciliation: discharge medications reconciled and changed, per note/orders (see AVS).  Plan of care communicated with patient and friend     Coding guidelines for this visit:  Type of Medical   Decision Making Face-to-Face Visit        within 7 Days of discharge Face-to-Face Visit        within 14 days of discharge   Moderate Complexity [id number]   High Complexity [id number]            Problem list and histories reviewed & adjusted, as indicated.  Additional history: as documented    Patient Active Problem List   Diagnosis     Temporomandibular joint disorder     Diverticulitis of colon     Disorder of bone and cartilage     Osteoarthritis     Alcohol abuse, in remission     Restless legs syndrome (RLS)     Major depressive disorder, recurrent episode, moderate     Essential hypertension with goal blood pressure less than 140/90     CARDIOVASCULAR SCREENING; LDL GOAL LESS THAN 130     Sciatica     Advanced directives, counseling/discussion     Colouterine fistula     Atrial fibrillation with controlled ventricular response (H)     Left ventricular hypertrophy     Health Care Home     Insomnia     Joint pains     Allergic reaction caused by a drug - likely plaquenil     Breast fibroadenoma     DVT (deep venous thrombosis) (H)     Fatty liver disease, nonalcoholic     Rib pain     Neck mass     Gastroesophageal reflux disease without esophagitis     Enlarged lymph node     Sjogren's syndrome (H)     ANGELICA (obstructive sleep apnea)     ILD (interstitial lung disease) (H)     Lower GI bleed     Acute and chronic respiratory failure with hypoxia (H)     (HFpEF) heart failure with preserved ejection fraction (H)     Type 2 diabetes mellitus with hyperglycemia, with long-term current use of insulin (H)     Heart failure, chronic, with acute decompensation (H)     Past Surgical History:   Procedure Laterality Date     BACK SURGERY  1962     BIOPSY BREAST  9/27/02    Biopsy Left Breast     C APPENDECTOMY  1970's?     C NONSPECIFIC PROCEDURE  11/05    exploratory abd lap, adhesions, resolved RLQ pain, diverticulitis episodes     CARDIAC SURGERY       CHOLECYSTECTOMY  1990's?     COLECTOMY LEFT  11/7/2011    Procedure:COLECTOMY LEFT; Laparoscopic  mobilization of splenic flexture, sigmoid colectomy, coloprotoscopy, loop illeostomy; Surgeon:CK CASTANEDA; Location:[facility] OR     HYSTERECTOMY TOTAL ABDOMINAL, BILATERAL SALPINGO-OOPHORECTOMY, COMBINED  11/7/2011    Procedure:COMBINED HYSTERECTOMY TOTAL ABDOMINAL, BILATERAL SALPINGO-OOPHORECTOMY; total abdominal hysterectomy, bilateral salpingo-oophorectomy; Surgeon:ALETA MANUEL; Location:[facility] OR     INSERT STENT URETER  11/7/2011    Procedure:INSERT STENT URETER; Placement of Bilateral Ureteral Stents ; Surgeon:PRANEETH BRYANT; Location:[facility] OR     SIGMOIDOSCOPY FLEXIBLE  11/3/2011    Procedure:SIGMOIDOSCOPY FLEXIBLE; Flexible Sigmoidoscopy; Surgeon:CK CASTANEDA; Location:[facility] OR     TAKEDOWN ILEOSTOMY  2/1/2012    Procedure:TAKEDOWN ILEOSTOMY; Takedown Loop Ileostomy ; Surgeon:CK CASTANEDA; Location:[facility] OR       Social History   Substance Use Topics     Smoking status: Former Smoker     Packs/day: 0.50     Years: 10.00     Types: Cigarettes     Quit date: 8/1/2011     Smokeless tobacco: Never Used      Comment: 1/2 ppd     Alcohol use No      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     Family History   Problem Relation Age of Onset     C.A.D. Mother 63     MI- first at age 63     HEART DISEASE Mother      Hypertension Mother      CEREBROVASCULAR DISEASE Mother      Hyperlipidemia Mother      Alcohol/Drug Father      Alzheimer Disease Father      Dementia Father      Hypertension Father      Hyperlipidemia Father      C.A.D. Sister 52     Minor MI- age 50's     HEART DISEASE Sister      Hypertension Sister      Hypertension Sister      Hypertension Brother      Cancer - colorectal Sister 48     Late 40's early 50's     Prostate Cancer Brother 74     Dx'd age 74     GASTROINTESTINAL DISEASE Sister      Diverticulitis     GASTROINTESTINAL DISEASE Brother      Diverticulitis     Lipids Sister      Lipids Sister      Parkinsonism Brother      DIABETES Sister      HEART DISEASE Sister      CHF     CANCER Sister       lung, smoker     Substance Abuse Sister      Substance Abuse Brother      Asthma Sister      CANCER Sister      Breast Cancer Daughter      Prostate Cancer Brother      Hyperlipidemia Brother          Current Outpatient Prescriptions   Medication Sig Dispense Refill     lisinopril (PRINIVIL/ZESTRIL) 2.5 MG tablet Take 1 tablet (2.5 mg) by mouth daily 90 tablet 1     furosemide (LASIX) 40 MG tablet Take 0.5 tablets (20 mg) by mouth 2 times daily 180 tablet 3     potassium chloride SA (K-DUR/KLOR-CON M) 20 MEQ CR tablet Take 1 tablet (20 mEq) by mouth daily 90 tablet 0     atorvastatin (LIPITOR) 40 MG tablet Take 1 tablet (40 mg) by mouth daily 90 tablet 1     lactobacillus rhamnosus, GG, (CULTURELL) capsule Take 1 capsule by mouth 3 times daily (before meals) 90 capsule 1     pantoprazole (PROTONIX) 40 MG EC tablet Take 1 tablet (40 mg) by mouth daily 90 tablet 1     blood glucose monitoring (NO BRAND SPECIFIED) test strip Use to test blood sugars 4 times daily or as directed 300 strip 3     blood glucose monitoring (ACCU-CHEK MULTICLIX) lancets Use to test blood sugar 4 times daily or as directed. 4 Box 3     insulin pen needle (B-D U/F) 31G X 8 MM Use 4 times daily (with Lantus and Novolog) or as directed. 400 each 3     insulin pen needle (BD JANE U/F) 32G X 4 MM Use 4 daily as directed. 200 each 11     blood glucose monitoring (ACCU-CHEK SHANNON PLUS) test strip Use to test blood sugar 4 times daily or as directed.  Ok to substitute alternative if insurance prefers. 120 strip 11     blood glucose monitoring (ACCU-CHEK FASTCLIX) lancets Use to test blood sugar 4 times daily or as directed.  Ok to substitute alternative if insurance prefers. 1 Box 11     insulin glargine (LANTUS) 100 UNIT/ML injection Inject 23 Units Subcutaneous every morning (before breakfast) 3 mL 1     predniSONE (DELTASONE) 10 MG tablet Take 2 tablets for three days (4/12-14), then take 1 tablet daily (10 mg daily starting 4/15) 90 tablet 3      metFORMIN (GLUCOPHAGE-XR) 500 MG 24 hr tablet Take 2 tablets (1,000 mg) by mouth daily (with dinner) 180 tablet 0     order for DME Oxygen: Patient requires supplemental Oxygen 4 LPM via nasal canula with activity. Please provide patient with POC to improve mobility, okay to titrate for conserving to keep stats above 90%. Please fax results to [phone].  Oxygen will be for a lifetime. 1 Device 0     pantoprazole (PROTONIX) 20 MG EC tablet Take 1-2 tablets (20-40 mg) by mouth daily 60 tablet 1     PARoxetine (PAXIL) 10 MG tablet TAKE 1 TABLET (10mg) BY MOUTH AT BEDTIME (Patient taking differently: Take 5 mg by mouth every morning TAKE 1 TABLET (10mg) BY MOUTH AT BEDTIME) 30 tablet 1     traZODone (DESYREL) 50 MG tablet TAKE 1/2-1 TABLET BY MOUTH NIGHTLY AS NEEDED, CAN TITRATE DOWN TO 1/2TAB OR UP TO 2TABS AS NEEDED 60 tablet 3     ketoconazole (NIZORAL) 2 % cream Apply topically 2 times daily 60 g 1     metoprolol (TOPROL-XL) 100 MG 24 hr tablet Take 1 tablet (100 mg) by mouth daily 90 tablet 3     spironolactone (ALDACTONE) 25 MG tablet Take 1 tablet (25 mg) by mouth daily 90 tablet 3     montelukast (SINGULAIR) 10 MG tablet Take 1 tablet (10 mg) by mouth At Bedtime 90 tablet 1     albuterol (PROAIR HFA, PROVENTIL HFA, VENTOLIN HFA) 108 (90 BASE) MCG/ACT inhaler Inhale 2 puffs into the lungs every 6 hours 1 Inhaler 3     order for DME Oxygen: Patient requires supplemental Oxygen 4 LPM via nasal canula with activity. Please provide patient with a home unit and with portability capability. Oxygen will be for a lifetime. 1 Device 0     polyethylene glycol (MIRALAX/GLYCOLAX) packet Take 17 g by mouth daily as needed for constipation 7 packet 0     warfarin (COUMADIN) 7.5 MG tablet Current dose is 7.5 mg daily.  Dose adjusted per INR result. 100 tablet 3     acyclovir (ZOVIRAX) 5 % ointment Apply topically 6 times daily (Patient taking differently: Apply topically 6 times daily As needed for outbreaks) 15 g 3      fluticasone (FLOVENT HFA) 220 MCG/ACT inhaler Inhale 2 puffs into the lungs 2 times daily 3 Inhaler 3     acyclovir (ZOVIRAX) 400 MG tablet Take 400 mg by mouth See Admin Instructions 5 times daily as needed for outbreaks       fluticasone (FLONASE) 50 MCG/ACT nasal spray Spray 1-2 sprays into both nostrils daily (Patient taking differently: Spray 1-2 sprays into both nostrils daily as needed for allergies ) 16 g 5     COMPRESSION STOCKINGS Wear compression stockings in affected leg (right leg) or both legs most of the time during the day and take them off at night. 2 each 2     acetaminophen (TYLENOL) 500 MG tablet Take 500 mg by mouth nightly as needed        insulin aspart (NOVOLOG FLEXPEN) 100 UNIT/ML injection Inject 12 Units Subcutaneous 3 times daily (with meals) 45 mL 0     Allergies   Allergen Reactions     Augmentin Nausea and Vomiting     Codeine Nausea and Vomiting     Phenobarbital Itching     Recent Labs   Lab Test  04/28/17   1504  04/11/17   0651   04/04/17   0650   04/02/17   2147  01/24/17   1338   11/03/16   1136  09/20/16   1045  09/02/16   1448   07/19/16   1254   08/06/14   1116   11/14/11   0626   05/03/11   0823   A1C   --    --    --   14.3*   --    --   9.4*   --    --    --   7.0*   --    --    --    --    --    --    < >   --    LDL   --    --    --    --    --    --    --    --    --   56   --    --    --    --   59   --    --    --   95   HDL   --    --    --    --    --    --    --    --    --   72   --    --    --    --   57   --    --    --   70   TRIG   --    --    --    --    --    --    --    --    --   123   --    --    --    --   129   --   72   < >  73   ALT   --    --    --    --    --   19   --    --   21   --    --    --   51*   < >  52*   < >  14   < >   --    CR  0.82  0.69   < >   --    < >  0.58  0.81   < >  1.02   --   0.98   < >  0.84   < >  0.82   < >  0.55   < >  0.83   GFRESTIMATED  68  82   < >   --    < >  >90  Non  GFR Calc    68   < >  53*   --    "55*   < >  66   < >  68   < >  >90   < >  68   GFRESTBLACK  82  >90   GFR Calc     < >   --    < >  >90   GFR Calc    83   < >  64   --   67   < >  79   < >  83   < >  >90   < >  82   POTASSIUM  4.2  3.6   < >   --    < >  3.3*  3.5   < >  3.6   --   3.8   < >  4.1   < >  4.1   < >  3.5   < >  4.5    < > = values in this interval not displayed.      BP Readings from Last 3 Encounters:   05/16/17 124/60   04/28/17 102/66   04/11/17 127/74    Wt Readings from Last 3 Encounters:   05/16/17 201 lb (91.2 kg)   04/28/17 203 lb 9 oz (92.3 kg)   04/18/17 200 lb (90.7 kg)                  Labs reviewed in EPIC    Reviewed and updated as needed this visit by clinical staff  Tobacco  Allergies  Meds  Problems  Med Hx  Surg Hx  Fam Hx  Soc Hx        Reviewed and updated as needed this visit by Provider  Allergies  Meds  Problems         ROS:  Constitutional, HEENT, cardiovascular, pulmonary, gi and gu systems are negative, except as otherwise noted.    OBJECTIVE:                                                    /66 (BP Location: Left arm, Patient Position: Left side, Cuff Size: Adult Large)  Pulse 107  Temp 97.5  F (36.4  C) (Tympanic)  Ht 5' 6.73\" (1.695 m)  Wt 203 lb 9 oz (92.3 kg)  SpO2 100%  Breastfeeding? No  BMI 32.14 kg/m2  Body mass index is 32.14 kg/(m^2).  GENERAL APPEARANCE: pleasant, slightly tired appearing, but alert and no distress     EYES: PERRL, sclera clear     HENT: nose and mouth without ulcers or lesions     NECK: no adenopathy, no asymmetry, masses, or scars and thyroid normal to palpation     RESP: lungs clear to auscultation - no rales, rhonchi or wheezes     CV: regular rates and rhythm, normal S1 S2, no S3 or S4 and no murmur, click or rub      Abdomen: soft, nontender, no HSM or masses and bowel sounds normal     Ext: warm, dry, trace edema bilaterally    Diagnostic Test Results:  Results for orders placed or performed in visit on 04/28/17 "   Basic metabolic panel  (Ca, Cl, CO2, Creat, Gluc, K, Na, BUN)   Result Value Ref Range    Sodium 140 133 - 144 mmol/L    Potassium 4.2 3.4 - 5.3 mmol/L    Chloride 105 94 - 109 mmol/L    Carbon Dioxide 25 20 - 32 mmol/L    Anion Gap 10 3 - 14 mmol/L    Glucose 121 (H) 70 - 99 mg/dL    Urea Nitrogen 15 7 - 30 mg/dL    Creatinine 0.82 0.52 - 1.04 mg/dL    GFR Estimate 68 >60 mL/min/1.7m2    GFR Estimate If Black 82 >60 mL/min/1.7m2    Calcium 8.8 8.5 - 10.1 mg/dL   CBC with platelets and differential   Result Value Ref Range    WBC 9.4 4.0 - 11.0 10e9/L    RBC Count 4.42 3.8 - 5.2 10e12/L    Hemoglobin 9.3 (L) 11.7 - 15.7 g/dL    Hematocrit 33.1 (L) 35.0 - 47.0 %    MCV 75 (L) 78 - 100 fl    MCH 21.0 (L) 26.5 - 33.0 pg    MCHC 28.1 (L) 31.5 - 36.5 g/dL    RDW 19.8 (H) 10.0 - 15.0 %    Platelet Count 296 150 - 450 10e9/L    Diff Method Automated Method     % Neutrophils 64.9 %    % Lymphocytes 19.0 %    % Monocytes 13.3 %    % Eosinophils 2.3 %    % Basophils 0.5 %    Absolute Neutrophil 6.1 1.6 - 8.3 10e9/L    Absolute Lymphocytes 1.8 0.8 - 5.3 10e9/L    Absolute Monocytes 1.3 0.0 - 1.3 10e9/L    Absolute Eosinophils 0.2 0.0 - 0.7 10e9/L    Absolute Basophils 0.1 0.0 - 0.2 10e9/L     *Note: Due to a large number of results and/or encounters for the requested time period, some results have not been displayed. A complete set of results can be found in Results Review.        ASSESSMENT/PLAN:                                                        ICD-10-CM    1. Atrial fibrillation with controlled ventricular response (H) I48.91 CBC with platelets and differential   2. Essential hypertension with goal blood pressure less than 140/90 I10 lisinopril (PRINIVIL/ZESTRIL) 2.5 MG tablet     Basic metabolic panel  (Ca, Cl, CO2, Creat, Gluc, K, Na, BUN)     CBC with platelets and differential   3. Sjogren's syndrome (H) M35.00 CBC with platelets and differential   4. (HFpEF) heart failure with preserved ejection fraction (H)  I50.30 Basic metabolic panel  (Ca, Cl, CO2, Creat, Gluc, K, Na, BUN)     potassium chloride SA (K-DUR/KLOR-CON M) 20 MEQ CR tablet   5. ANGELICA (obstructive sleep apnea) G47.33    6. ILD (interstitial lung disease) (H) J84.9    7. Acute and chronic respiratory failure with hypoxia (H) J96.21 potassium chloride SA (K-DUR/KLOR-CON M) 20 MEQ CR tablet   8. Acute edema of lung (H) J81.0 furosemide (LASIX) 40 MG tablet   9. Type 2 diabetes mellitus without complication, with long-term current use of insulin (H) E11.9 Basic metabolic panel  (Ca, Cl, CO2, Creat, Gluc, K, Na, BUN)    Z79.4    10. Acute on chronic heart failure, unspecified heart failure type (H) I50.9 potassium chloride SA (K-DUR/KLOR-CON M) 20 MEQ CR tablet   11. Type 2 diabetes mellitus with other specified complication (H) E11.69 atorvastatin (LIPITOR) 40 MG tablet     Multiple issues discussed.  --Respiratory issues- Much improved. Very happy to hear that she is breathing better and off oxygen, though unsure how to explain the overall improvement in baseline breathing due to acute txt of flu/pneumonia during 4/17 hospitalization.  She had been needing oxygen with most activity out of the house, and now she is able to do regular activities without needing oxygen.  Will be curious to hear thoughts from pulmonary and cardiology (appts in 6/17 and 7/17).  --Meds thoroughly reviewed. No significant changes, though will keep her on lasix 20mg BID (what she was on [facility], but was d/c'd on 40mg/d).  Labs today- BMP and CBC.  Needs recheck of potassium- is still on potassium supplementation.  --DM- drastic worsening of diabetes with A1C going from 7.0 in 9/16 to 9.4 in 1/17 to 14.3 on 4/4/17 in hospital.  Now stabilized on multiple meds- metformin, lantus 23 units qam, and novolog with meals (and sliding scale insulin).  Really liked the DM educator she met, but that provider is moving on, so pt may see MTM here, or go back and see DM educators at the [place]  location.  Pt notes she's now aware of how serious it is.  --Anemia/low Hgb- has been stable s/p GI bleed, though still low in 9's.  Further w/u of the source had been put off due to pt's condition.  Will continue to discuss with pt's improvement in breathing and improvement in kidney functioning.    RTC in 1 month for further f/u.    Ilene Tristan MD  [facility]

## 2017-04-28 NOTE — MR AVS SNAPSHOT
After Visit Summary   4/28/2017    Betty Tee    MRN: [id number]           Patient Information     Date Of Birth          1940        Visit Information        Provider Department      4/28/2017 1:30 PM Ilene Tristan MD [facility]        Today's Diagnoses     Atrial fibrillation with controlled ventricular response (H)    -  1    Essential hypertension with goal blood pressure less than 140/90        Sjogren's syndrome (H)        (HFpEF) heart failure with preserved ejection fraction (H)        ANGELICA (obstructive sleep apnea)        ILD (interstitial lung disease) (H)        Acute and chronic respiratory failure with hypoxia (H)        Acute edema of lung (H)        Type 2 diabetes mellitus without complication, with long-term current use of insulin (H)        Acute on chronic heart failure, unspecified heart failure type (H)        Type 2 diabetes mellitus with other specified complication (H)           Follow-ups after your visit        Your next 10 appointments already scheduled     Jun 22, 2017  4:30 PM CDT   Lab with  LAB   [facility] ([facility])    [address] [place] [id number] [phone]            Jun 22, 2017  5:00 PM CDT   (Arrive by 4:45 PM)   RETURN HEART FAILURE with Radha Rosa MD   [facility] Heart Care ([facility])    [address] [place] [id number] [phone]            Jul 05, 2017 11:00 AM CDT   PFT VISIT with  PFL B   [facility] Pulmonary Function Testing ([facility])    [address] [place] [id number] [phone]            Jul 05, 2017 11:30 AM CDT   (Arrive by 11:15 AM)   Return Interstitial Lung with Meño Walsh MD   [facility] ([facility])    [address] [place] [id number]  "  [phone]              Who to contact     If you have questions or need follow up information about today's clinic visit or your schedule please contact [facility] directly at [phone].  Normal or non-critical lab and imaging results will be communicated to you by MyChart, letter or phone within 4 business days after the clinic has received the results. If you do not hear from us within 7 days, please contact the clinic through [employer] or phone. If you have a critical or abnormal lab result, we will notify you by phone as soon as possible.  Submit refill requests through [employer] or call your pharmacy and they will forward the refill request to us. Please allow 3 business days for your refill to be completed.          Additional Information About Your Visit        [employer] Information     [employer] gives you secure access to your electronic health record. If you see a primary care provider, you can also send messages to your care team and make appointments. If you have questions, please call your primary care clinic.  If you do not have a primary care provider, please call [phone] and they will assist you.        Care EveryWhere ID     This is your Care EveryWhere ID. This could be used by other organizations to access your [facility] medical records  [id number]        Your Vitals Were     Pulse Temperature Height Pulse Oximetry Breastfeeding? BMI (Body Mass Index)    107 97.5  F (36.4  C) (Tympanic) 5' 6.73\" (1.695 m) 100% No 32.14 kg/m2       Blood Pressure from Last 3 Encounters:   04/28/17 102/66   04/11/17 127/74   03/08/17 130/70    Weight from Last 3 Encounters:   04/28/17 203 lb 9 oz (92.3 kg)   04/18/17 200 lb (90.7 kg)   04/11/17 205 lb 14.4 oz (93.4 kg)              We Performed the Following     Basic metabolic panel  (Ca, Cl, CO2, Creat, Gluc, K, Na, BUN)     CBC with platelets and differential          Today's Medication Changes          These changes are accurate as of: 4/28/17  " 2:55 PM.  If you have any questions, ask your nurse or doctor.               These medicines have changed or have updated prescriptions.        Dose/Directions    acyclovir 5 % ointment   Commonly known as:  ZOVIRAX   This may have changed:  additional instructions   Used for:  Recurrent cold sores        Apply topically 6 times daily   Quantity:  15 g   Refills:  3       fluticasone 50 MCG/ACT spray   Commonly known as:  FLONASE   This may have changed:    - when to take this  - reasons to take this   Used for:  Seasonal allergic rhinitis        Dose:  1-2 spray   Spray 1-2 sprays into both nostrils daily   Quantity:  16 g   Refills:  5       furosemide 40 MG tablet   Commonly known as:  LASIX   This may have changed:  how much to take   Used for:  Acute edema of lung (H)   Changed by:  Ilene Tristan MD        Dose:  20 mg   Take 0.5 tablets (20 mg) by mouth 2 times daily   Quantity:  180 tablet   Refills:  3       PARoxetine 10 MG tablet   Commonly known as:  PAXIL   This may have changed:    - how much to take  - how to take this  - when to take this  - additional instructions   Used for:  Major depressive disorder, recurrent episode, moderate (H)        TAKE 1 TABLET (10mg) BY MOUTH AT BEDTIME   Quantity:  30 tablet   Refills:  1            Where to get your medicines      These medications were sent to [id number], [place], [place] - [address] [address]     Phone:  [phone]     atorvastatin 40 MG tablet    lisinopril 2.5 MG tablet    potassium chloride SA 20 MEQ CR tablet                Primary Care Provider Office Phone # Fax #    Ilene Tristan [phone]       [facility] [address] [address]        Thank you!     Thank you for choosing [facility]  for your care. Our goal is always to provide you with excellent care. Hearing back from our patients is one way  we can continue to improve our services. Please take a few minutes to complete the written survey that you may receive in the mail after your visit with us. Thank you!             Your Updated Medication List - Protect others around you: Learn how to safely use, store and throw away your medicines at www.disposemymeds.org.          This list is accurate as of: 4/28/17  2:55 PM.  Always use your most recent med list.                   Brand Name Dispense Instructions for use    acyclovir 400 MG tablet    ZOVIRAX     Take 400 mg by mouth See Admin Instructions 5 times daily as needed for outbreaks       acyclovir 5 % ointment    ZOVIRAX    15 g    Apply topically 6 times daily       albuterol 108 (90 BASE) MCG/ACT Inhaler    PROAIR HFA/PROVENTIL HFA/VENTOLIN HFA    1 Inhaler    Inhale 2 puffs into the lungs every 6 hours       atorvastatin 40 MG tablet    LIPITOR    90 tablet    Take 1 tablet (40 mg) by mouth daily       * blood glucose monitoring lancets     4 Box    Use to test blood sugar 4 times daily or as directed.       * blood glucose monitoring lancets     1 Box    Use to test blood sugar 4 times daily or as directed.  Ok to substitute alternative if insurance prefers.       * blood glucose monitoring test strip    no brand specified    300 strip    Use to test blood sugars 4 times daily or as directed       * blood glucose monitoring test strip    ACCU-CHEK SHANNON PLUS    120 strip    Use to test blood sugar 4 times daily or as directed.  Ok to substitute alternative if insurance prefers.       COMPRESSION STOCKINGS     2 each    Wear compression stockings in affected leg (right leg) or both legs most of the time during the day and take them off at night.       fluticasone 220 MCG/ACT Inhaler    FLOVENT HFA    3 Inhaler    Inhale 2 puffs into the lungs 2 times daily       fluticasone 50 MCG/ACT spray    FLONASE    16 g    Spray 1-2 sprays into both nostrils daily       furosemide 40 MG tablet    LASIX    180  tablet    Take 0.5 tablets (20 mg) by mouth 2 times daily       guaiFENesin-dextromethorphan 100-10 MG/5ML syrup    ROBITUSSIN DM    473 mL    Take 10 mLs by mouth every 4 hours as needed for cough       insulin aspart 100 UNIT/ML injection    NovoLOG PEN    3 mL    Inject 10 Units Subcutaneous 3 times daily (with meals)       insulin glargine 100 UNIT/ML injection    LANTUS    3 mL    Inject 23 Units Subcutaneous every morning (before breakfast)       * insulin pen needle 31G X 8 MM    B-D U/F    400 each    Use 4 times daily (with Lantus and Novolog) or as directed.       * insulin pen needle 32G X 4 MM    BD JANE U/F    200 each    Use 4 daily as directed.       ketoconazole 2 % cream    NIZORAL    60 g    Apply topically 2 times daily       lactobacillus rhamnosus (GG) capsule     90 capsule    Take 1 capsule by mouth 3 times daily (before meals)       lisinopril 2.5 MG tablet    PRINIVIL/Zestril    90 tablet    Take 1 tablet (2.5 mg) by mouth daily       metFORMIN 500 MG 24 hr tablet    GLUCOPHAGE-XR    180 tablet    Take 2 tablets (1,000 mg) by mouth daily (with dinner)       metoprolol 100 MG 24 hr tablet    TOPROL-XL    90 tablet    Take 1 tablet (100 mg) by mouth daily       montelukast 10 MG tablet    SINGULAIR    90 tablet    Take 1 tablet (10 mg) by mouth At Bedtime       * order for DME     1 Device    Oxygen: Patient requires supplemental Oxygen 4 LPM via nasal canula with activity. Please provide patient with a home unit and with portability capability. Oxygen will be for a lifetime.       * order for DME     1 Device    Oxygen: Patient requires supplemental Oxygen 4 LPM via nasal canula with activity. Please provide patient with POC to improve mobility, okay to titrate for conserving to keep stats above 90%. Please fax results to [phone].  Oxygen will be for a lifetime.       * pantoprazole 20 MG EC tablet    PROTONIX    60 tablet    Take 1-2 tablets (20-40 mg) by mouth daily       *  pantoprazole 40 MG EC tablet    PROTONIX    90 tablet    Take 1 tablet (40 mg) by mouth daily       PARoxetine 10 MG tablet    PAXIL    30 tablet    TAKE 1 TABLET (10mg) BY MOUTH AT BEDTIME       polyethylene glycol Packet    MIRALAX/GLYCOLAX    7 packet    Take 17 g by mouth daily as needed for constipation       potassium chloride SA 20 MEQ CR tablet    K-DUR/KLOR-CON M    90 tablet    Take 1 tablet (20 mEq) by mouth daily       predniSONE 10 MG tablet    DELTASONE    90 tablet    Take 2 tablets for three days (4/12-14), then take 1 tablet daily (10 mg daily starting 4/15)       spironolactone 25 MG tablet    ALDACTONE    90 tablet    Take 1 tablet (25 mg) by mouth daily       traZODone 50 MG tablet    DESYREL    60 tablet    TAKE 1/2-1 TABLET BY MOUTH NIGHTLY AS NEEDED, CAN TITRATE DOWN TO 1/2TAB OR UP TO 2TABS AS NEEDED       TYLENOL 500 MG tablet   Generic drug:  acetaminophen      Take 500 mg by mouth nightly as needed       warfarin 7.5 MG tablet    COUMADIN    100 tablet    Current dose is 7.5 mg daily.  Dose adjusted per INR result.       * Notice:  This list has 10 medication(s) that are the same as other medications prescribed for you. Read the directions carefully, and ask your doctor or other care provider to review them with you.

## 2017-04-29 LAB
ANION GAP SERPL CALCULATED.3IONS-SCNC: 10 MMOL/L (ref 3–14)
BUN SERPL-MCNC: 15 MG/DL (ref 7–30)
CALCIUM SERPL-MCNC: 8.8 MG/DL (ref 8.5–10.1)
CHLORIDE SERPL-SCNC: 105 MMOL/L (ref 94–109)
CO2 SERPL-SCNC: 25 MMOL/L (ref 20–32)
CREAT SERPL-MCNC: 0.82 MG/DL (ref 0.52–1.04)
GFR SERPL CREATININE-BSD FRML MDRD: 68 ML/MIN/1.7M2
GLUCOSE SERPL-MCNC: 121 MG/DL (ref 70–99)
POTASSIUM SERPL-SCNC: 4.2 MMOL/L (ref 3.4–5.3)
SODIUM SERPL-SCNC: 140 MMOL/L (ref 133–144)

## 2017-05-01 NOTE — PROGRESS NOTES
Here are your lab results from your recent visit...  -Your basic metabolic panel (which includes electrolyte levels, blood sugar level and kidney function tests) looks very good with just the elevated glucose (which is still in a reasonable range).  -Your CBC labs (which includes blood labs looking for signs of infection or anemia) is abnormal showing continued anemia, but it remains stable.      Please let me know if you have any questions.  Best,   Lev Tristan MD

## 2017-05-04 ENCOUNTER — TELEPHONE (OUTPATIENT)
Dept: FAMILY MEDICINE | Facility: CLINIC | Age: 77
End: 2017-05-04
Payer: MEDICARE

## 2017-05-04 DIAGNOSIS — Z79.01 LONG TERM CURRENT USE OF ANTICOAGULANT THERAPY: Primary | ICD-10-CM

## 2017-05-04 LAB — INR POINT OF CARE: 3.2 (ref 0.86–1.14)

## 2017-05-04 PROCEDURE — 99207 ZZC NO CHARGE NURSE ONLY: CPT | Performed by: FAMILY MEDICINE

## 2017-05-04 NOTE — TELEPHONE ENCOUNTER
Reason for call: INR   Who is calling? Chivo with [facility]    Phone number of Home Care RN: [phone]    Fax number: n/a    Name of caller: Chivo    INR Value: 3.2    Are there any other concerns: No

## 2017-05-04 NOTE — TELEPHONE ENCOUNTER
ANTICOAGULATION FOLLOW-UP CLINIC VISIT    Patient Name:  Betty Tee  Date:  5/4/2017  Contact Type:  Telephone. Dose instructions given to Chivo from [facility]  ([phone])    SUBJECTIVE:  Bleeding Signs/Symptoms: None  Thromboembolic Signs/Symptoms: None    Medication Changes:  No  Dietary Changes:  No  Bacterial/Viral Infection:  No    Missed Coumadin Doses:  None  Other Concerns:  No      ASSESSMENT/PLAN:  See: ANTICOAGULATION QIC flow sheet.    INR 3.2  Plan: 5 mg TuTh and 7.5 mg rest of week = 47.5 mg/wk. (5% decrease). Recheck INR in 1 week.  Sophia Hemphill RN      Dosage adjustment made based on physician directed care plan.    JIMI VOGT

## 2017-05-09 ENCOUNTER — TELEPHONE (OUTPATIENT)
Dept: PHARMACY | Facility: CLINIC | Age: 77
End: 2017-05-09

## 2017-05-09 NOTE — TELEPHONE ENCOUNTER
Received call from patient today - she has received letters asking her to schedule an MTM appointment, but she does not feel this is necessary. Since she has home care coming in 2 x/day she feels her meds are well taken care of. We did discuss that CW and I are concerned about her diabetes, and that is the primary reason for her visits, but she again declines stating that she has far too many appointments and does not want another one.     I did get her to talk about her BG very briefly - sounds like she is running 120's in the mornings. She was started on insulin after hospitalization on 4/2-4/11. She also saw CDE team on 4/18.     MTM will not make further attempts to reach out to this patient at this time, but will be happy to see the patient should she be re-referred by Dr. Tristan.     Sabrina Meek, MarcosD, JAMES, BCACP  MTM Pharmacist, [facility]

## 2017-05-10 ENCOUNTER — TELEPHONE (OUTPATIENT)
Dept: FAMILY MEDICINE | Facility: CLINIC | Age: 77
End: 2017-05-10
Payer: MEDICARE

## 2017-05-10 ENCOUNTER — DOCUMENTATION ONLY (OUTPATIENT)
Dept: CARE COORDINATION | Facility: CLINIC | Age: 77
End: 2017-05-10

## 2017-05-10 DIAGNOSIS — Z79.01 LONG TERM CURRENT USE OF ANTICOAGULANT THERAPY: Primary | ICD-10-CM

## 2017-05-10 LAB — INR POINT OF CARE: 1.8 (ref 0.86–1.14)

## 2017-05-10 PROCEDURE — 85610 PROTHROMBIN TIME: CPT | Mod: QW | Performed by: FAMILY MEDICINE

## 2017-05-10 PROCEDURE — 99207 ZZC NO CHARGE NURSE ONLY: CPT | Performed by: FAMILY MEDICINE

## 2017-05-10 PROCEDURE — 36416 COLLJ CAPILLARY BLOOD SPEC: CPT | Performed by: FAMILY MEDICINE

## 2017-05-10 NOTE — TELEPHONE ENCOUNTER
ANTICOAGULATION FOLLOW-UP CLINIC VISIT    Patient Name:  Betty Tee  Date:  5/10/2017  Contact Type:  Telephone, home care nurse, Shayla    SUBJECTIVE:  Bleeding Signs/Symptoms: None  Thromboembolic Signs/Symptoms: None    Medication Changes:  No  Dietary Changes:  No  Bacterial/Viral Infection:  No    Missed Coumadin Doses:  None  Other Concerns:  No      ASSESSMENT/PLAN:  See: ANTICOAGULATION QIC flow sheet.    MEDICATION MANAGEMENT  Education Provided:    INR: 1.8  Dose: 7.5 mg q d and recheck on Tuesday.  Dose instructions: given to Shayla via phone.  Scheduled pt on Tuesday to see CW for INR, hospital f/u, MTM for education with med set up.    Dosage adjustment made based on physician directed care plan. TANISHA/JIMI JOSE

## 2017-05-10 NOTE — TELEPHONE ENCOUNTER
Reason for Call:  INR    Who is calling?  Home Care: [facility]    Phone number:  [phone]      Name of caller: Oc    INR Value:  1.8    Are there any other concerns:  No    Can we leave a detailed message on this number? YES          Call taken on 5/10/2017 at 1:14 PM by Carmelo Cota

## 2017-05-11 NOTE — PROGRESS NOTES
[facility] now requests orders and shares plan of care/discharge summaries for some patients through [employer].  Please REPLY TO THIS MESSAGE in order to give authorization for orders when needed.  This is considered a verbal order, you will still receive a faxed copy of orders for signature.  Thank you for your assistance in improving collaboration for our patients.    DISCHARGE SUMMARY  Discharge Summary  Goals met     Patient/Caregiver will verbalize s/s to report to Home Care clinician or physician    Patient will demonstrate ability to maintain safety in home environment without injury/falls and  demonstrate ability to maintain condition in the home without hospitalization, ER visit, or unplanned physicians visit.  Patient will demonstrate knowledge of disease process, treatment goals and self-care management and demonstrate stable  physiological status within normal limits for patient  Patient/caregiver will demonstrate effective disease management practices and achieve adequate symptom control through use of medications or other therapies/treatments    Patient will remain free of S/S of infection and demonstrate compliance with treatment plan, diet, meds, exercise, other    Patient/caregiver will verbalize appropriate measures for managing changes in body image/lifestyle   Patient/caregiver will verbalize community resources available and how to contact them     Patient status imporved sicne admission to [facility].  Discharge instructions given to  Patient , who verbalizes understanding.  Medication list created with corresponding numbered medication vials.  Patient able to demonstrate appropriate medication set up.  Educated patient on insulin administration and reason for diuretics.  Nutrition education.  Patient educated on high carbohydrate foods and how carbohydrates affect blood sugars. Patient verbalized understanding.  Would benefit from further diabetes education.    Summary  of care  This is a 77 year alert and oriented white female being discharged from [facility] as she is no longer homebound.  She was admitted to home care on 4/19/2017 after a brief TCU stay from 4/11/17 to 4/18/17 after she was inpatient from 4/8/18 to 4/11/17 for pneumonia.   During her home care episode she worked with skilled nursing on medication education and management,  diabetic education and insulin administration.  She is able to independently set up her medication snow as well as administer her insulin.  She would benefit from further diabetic education and nutrition counseling.  She reports shortness of breath and uses her oxygen as needed only.  She does not use her CPap regularly although does recognize the benefits.  Patient was given education today regarding the benfitsof using her Cpap and oxygen regularly.  Patients vitals today are /64, P 97, R 16, T 97.7, O2 97 percent on room air.  Lungs sounds clear, Bowel sounds within normal limits, Heart sounds within normal limits. SHe is alert and orineted in all spheres. Exhibits no anxiety regarding discharge from services.  Patient asks appropriate questions regarding follow up and discharge.    Follow up with PCP Dr. Bran on 5/16/17 for INR and with pharm D for medication management  Physician notified of discharge via Paulo Troy RN

## 2017-05-16 ENCOUNTER — OFFICE VISIT (OUTPATIENT)
Dept: PHARMACY | Facility: CLINIC | Age: 77
End: 2017-05-16

## 2017-05-16 ENCOUNTER — TELEPHONE (OUTPATIENT)
Dept: FAMILY MEDICINE | Facility: CLINIC | Age: 77
End: 2017-05-16

## 2017-05-16 ENCOUNTER — TELEPHONE (OUTPATIENT)
Dept: FAMILY MEDICINE | Facility: CLINIC | Age: 77
End: 2017-05-16
Payer: MEDICARE

## 2017-05-16 ENCOUNTER — OFFICE VISIT (OUTPATIENT)
Dept: FAMILY MEDICINE | Facility: CLINIC | Age: 77
End: 2017-05-16
Payer: MEDICARE

## 2017-05-16 VITALS
DIASTOLIC BLOOD PRESSURE: 60 MMHG | RESPIRATION RATE: 14 BRPM | OXYGEN SATURATION: 97 % | HEART RATE: 70 BPM | TEMPERATURE: 99.2 F | WEIGHT: 201 LBS | SYSTOLIC BLOOD PRESSURE: 124 MMHG | BODY MASS INDEX: 31.74 KG/M2

## 2017-05-16 DIAGNOSIS — Z79.4 TYPE 2 DIABETES MELLITUS WITH HYPERGLYCEMIA, WITH LONG-TERM CURRENT USE OF INSULIN (H): ICD-10-CM

## 2017-05-16 DIAGNOSIS — Z53.9 ERRONEOUS ENCOUNTER--DISREGARD: Primary | ICD-10-CM

## 2017-05-16 DIAGNOSIS — J96.21 ACUTE AND CHRONIC RESPIRATORY FAILURE WITH HYPOXIA (H): Primary | ICD-10-CM

## 2017-05-16 DIAGNOSIS — K92.2 LOWER GI BLEED: ICD-10-CM

## 2017-05-16 DIAGNOSIS — Z79.01 LONG TERM CURRENT USE OF ANTICOAGULANT THERAPY: Primary | ICD-10-CM

## 2017-05-16 DIAGNOSIS — E78.5 HYPERLIPIDEMIA LDL GOAL <100: ICD-10-CM

## 2017-05-16 DIAGNOSIS — I10 ESSENTIAL HYPERTENSION WITH GOAL BLOOD PRESSURE LESS THAN 140/90: ICD-10-CM

## 2017-05-16 DIAGNOSIS — D50.0 IRON DEFICIENCY ANEMIA DUE TO CHRONIC BLOOD LOSS: ICD-10-CM

## 2017-05-16 DIAGNOSIS — E11.65 TYPE 2 DIABETES MELLITUS WITH HYPERGLYCEMIA, WITH LONG-TERM CURRENT USE OF INSULIN (H): ICD-10-CM

## 2017-05-16 DIAGNOSIS — I50.30 (HFPEF) HEART FAILURE WITH PRESERVED EJECTION FRACTION (H): ICD-10-CM

## 2017-05-16 DIAGNOSIS — E11.69 TYPE 2 DIABETES MELLITUS WITH OTHER SPECIFIED COMPLICATION (H): ICD-10-CM

## 2017-05-16 LAB — INR POINT OF CARE: 1.9 (ref 0.86–1.14)

## 2017-05-16 PROCEDURE — 36416 COLLJ CAPILLARY BLOOD SPEC: CPT | Performed by: FAMILY MEDICINE

## 2017-05-16 PROCEDURE — 99207 ZZC NO CHARGE NURSE ONLY: CPT | Performed by: FAMILY MEDICINE

## 2017-05-16 PROCEDURE — 99215 OFFICE O/P EST HI 40 MIN: CPT | Performed by: FAMILY MEDICINE

## 2017-05-16 PROCEDURE — 85610 PROTHROMBIN TIME: CPT | Mod: QW | Performed by: FAMILY MEDICINE

## 2017-05-16 NOTE — TELEPHONE ENCOUNTER
Sabrina- could you help me with this one?  She is taking 12 units daily (3x/day with meals) now.  Did talk to her about MTM vs DM Ed, she is mulling on it, and may call you, or may see DM Ed first, and then see you ongoing after that.  Thank you!  CW

## 2017-05-16 NOTE — PROGRESS NOTES
SUBJECTIVE:                                                    Betty Tee is a 77 year old female who presents to clinic today for the following health issues:    CHF/weight- lasix 20mg 2x/day (1/2 tab if she has a 40mg tab).  Weight has been stable, usually around 200 lbs, ranging from ~198 lbs to 203 lbs.  HTN- good control today.  Afib- needs INR today.  No palpitations.      Low hgb-   No further blood in stool, though does have nose bleeds.  Hgb still in the 9's.   Dr. Norton - discussed potential w/u with pt, after acute GI sx's, and favored not doing a colonoscopy due to extensive other medical issues.  Wondered if pt would pursue chemo if w/u showed cancer- pt stated she would not- still feels this way.  Now that she is a bit more stable, she is still not interested in working up the low hgb.    In general, feels pretty good.    DM-   Mon am 116,   Tues am 120  All staying under 200.  PM's 133-186    Taking metformin 1000mg/day.  Lantus- 25 units in morning (up from 23 units)  Novolog- 12 units 3x/day with meals (up from 10 units/day)  Stools still formed, no GI sx's.  Thinks she's handling the insulin pretty well.  Diet- so much more careful- just one day, 'blew it' at [employer], >200, only time.    Home health nurse-   Last visit last Thur, unfortunately- completed a very thorough list of her meds, and when to take them.  She set them up for the week.  Pt will start next week.    Thinks she'll be able to do it, especially with the list made up by the nurse.    Reviewed medications she's on, making sure home list matched with our list, and adding the diagnosis for the medications she takes.    Problem list and histories reviewed & adjusted, as indicated.  Additional history: as documented    Patient Active Problem List   Diagnosis     Temporomandibular joint disorder     Diverticulitis of colon     Disorder of bone and cartilage     Osteoarthritis     Alcohol abuse, in remission     Restless  legs syndrome (RLS)     Major depressive disorder, recurrent episode, moderate     Essential hypertension with goal blood pressure less than 140/90     CARDIOVASCULAR SCREENING; LDL GOAL LESS THAN 130     Sciatica     Advanced directives, counseling/discussion     Colouterine fistula     Atrial fibrillation with controlled ventricular response (H)     Left ventricular hypertrophy     Health Care Home     Insomnia     Joint pains     Allergic reaction caused by a drug - likely plaquenil     Breast fibroadenoma     DVT (deep venous thrombosis) (H)     Fatty liver disease, nonalcoholic     Rib pain     Neck mass     Gastroesophageal reflux disease without esophagitis     Enlarged lymph node     Sjogren's syndrome (H)     ANGELICA (obstructive sleep apnea)     ILD (interstitial lung disease) (H)     Lower GI bleed     Acute and chronic respiratory failure with hypoxia (H)     (HFpEF) heart failure with preserved ejection fraction (H)     Type 2 diabetes mellitus with hyperglycemia, with long-term current use of insulin (H)     Heart failure, chronic, with acute decompensation (H)      Current Outpatient Prescriptions   Medication Sig Dispense Refill     lisinopril (PRINIVIL/ZESTRIL) 2.5 MG tablet Take 1 tablet (2.5 mg) by mouth daily 90 tablet 1     furosemide (LASIX) 40 MG tablet Take 0.5 tablets (20 mg) by mouth 2 times daily 180 tablet 3     potassium chloride SA (K-DUR/KLOR-CON M) 20 MEQ CR tablet Take 1 tablet (20 mEq) by mouth daily 90 tablet 0     atorvastatin (LIPITOR) 40 MG tablet Take 1 tablet (40 mg) by mouth daily 90 tablet 1     lactobacillus rhamnosus, GG, (CULTURELL) capsule Take 1 capsule by mouth 3 times daily (before meals) 90 capsule 1     pantoprazole (PROTONIX) 40 MG EC tablet Take 1 tablet (40 mg) by mouth daily 90 tablet 1     blood glucose monitoring (NO BRAND SPECIFIED) test strip Use to test blood sugars 4 times daily or as directed 300 strip 3     blood glucose monitoring (ACCU-CHEK MULTICLIX)  lancets Use to test blood sugar 4 times daily or as directed. 4 Box 3     insulin pen needle (B-D U/F) 31G X 8 MM Use 4 times daily (with Lantus and Novolog) or as directed. 400 each 3     insulin pen needle (BD JANE U/F) 32G X 4 MM Use 4 daily as directed. 200 each 11     blood glucose monitoring (ACCU-CHEK SHANNON PLUS) test strip Use to test blood sugar 4 times daily or as directed.  Ok to substitute alternative if insurance prefers. 120 strip 11     blood glucose monitoring (ACCU-CHEK FASTCLIX) lancets Use to test blood sugar 4 times daily or as directed.  Ok to substitute alternative if insurance prefers. 1 Box 11     insulin glargine (LANTUS) 100 UNIT/ML injection Inject 23 Units Subcutaneous every morning (before breakfast) 3 mL 1     predniSONE (DELTASONE) 10 MG tablet Take 2 tablets for three days (4/12-14), then take 1 tablet daily (10 mg daily starting 4/15) 90 tablet 3     metFORMIN (GLUCOPHAGE-XR) 500 MG 24 hr tablet Take 2 tablets (1,000 mg) by mouth daily (with dinner) 180 tablet 0     order for DME Oxygen: Patient requires supplemental Oxygen 4 LPM via nasal canula with activity. Please provide patient with POC to improve mobility, okay to titrate for conserving to keep stats above 90%. Please fax results to [phone].  Oxygen will be for a lifetime. 1 Device 0     pantoprazole (PROTONIX) 20 MG EC tablet Take 1-2 tablets (20-40 mg) by mouth daily 60 tablet 1     PARoxetine (PAXIL) 10 MG tablet TAKE 1 TABLET (10mg) BY MOUTH AT BEDTIME (Patient taking differently: Take 5 mg by mouth every morning TAKE 1 TABLET (10mg) BY MOUTH AT BEDTIME) 30 tablet 1     traZODone (DESYREL) 50 MG tablet TAKE 1/2-1 TABLET BY MOUTH NIGHTLY AS NEEDED, CAN TITRATE DOWN TO 1/2TAB OR UP TO 2TABS AS NEEDED 60 tablet 3     ketoconazole (NIZORAL) 2 % cream Apply topically 2 times daily 60 g 1     metoprolol (TOPROL-XL) 100 MG 24 hr tablet Take 1 tablet (100 mg) by mouth daily 90 tablet 3     spironolactone (ALDACTONE) 25 MG  tablet Take 1 tablet (25 mg) by mouth daily 90 tablet 3     montelukast (SINGULAIR) 10 MG tablet Take 1 tablet (10 mg) by mouth At Bedtime 90 tablet 1     albuterol (PROAIR HFA, PROVENTIL HFA, VENTOLIN HFA) 108 (90 BASE) MCG/ACT inhaler Inhale 2 puffs into the lungs every 6 hours 1 Inhaler 3     order for DME Oxygen: Patient requires supplemental Oxygen 4 LPM via nasal canula with activity. Please provide patient with a home unit and with portability capability. Oxygen will be for a lifetime. 1 Device 0     polyethylene glycol (MIRALAX/GLYCOLAX) packet Take 17 g by mouth daily as needed for constipation 7 packet 0     warfarin (COUMADIN) 7.5 MG tablet Current dose is 7.5 mg daily.  Dose adjusted per INR result. 100 tablet 3     acyclovir (ZOVIRAX) 5 % ointment Apply topically 6 times daily (Patient taking differently: Apply topically 6 times daily As needed for outbreaks) 15 g 3     fluticasone (FLOVENT HFA) 220 MCG/ACT inhaler Inhale 2 puffs into the lungs 2 times daily 3 Inhaler 3     acyclovir (ZOVIRAX) 400 MG tablet Take 400 mg by mouth See Admin Instructions 5 times daily as needed for outbreaks       fluticasone (FLONASE) 50 MCG/ACT nasal spray Spray 1-2 sprays into both nostrils daily (Patient taking differently: Spray 1-2 sprays into both nostrils daily as needed for allergies ) 16 g 5     COMPRESSION STOCKINGS Wear compression stockings in affected leg (right leg) or both legs most of the time during the day and take them off at night. 2 each 2     acetaminophen (TYLENOL) 500 MG tablet Take 500 mg by mouth nightly as needed        insulin aspart (NOVOLOG FLEXPEN) 100 UNIT/ML injection Inject 12 Units Subcutaneous 3 times daily (with meals) 45 mL 0     Allergies   Allergen Reactions     Augmentin Nausea and Vomiting     Codeine Nausea and Vomiting     Phenobarbital Itching     Recent Labs   Lab Test  04/28/17   1504  04/11/17   0651   04/04/17   0650   04/02/17   2147  01/24/17   1338   11/03/16   1136   09/20/16   1045  09/02/16   1448   07/19/16   1254   08/06/14   1116   11/14/11   0626   05/03/11   0823   A1C   --    --    --   14.3*   --    --   9.4*   --    --    --   7.0*   --    --    --    --    --    --    < >   --    LDL   --    --    --    --    --    --    --    --    --   56   --    --    --    --   59   --    --    --   95   HDL   --    --    --    --    --    --    --    --    --   72   --    --    --    --   57   --    --    --   70   TRIG   --    --    --    --    --    --    --    --    --   123   --    --    --    --   129   --   72   < >  73   ALT   --    --    --    --    --   19   --    --   21   --    --    --   51*   < >  52*   < >  14   < >   --    CR  0.82  0.69   < >   --    < >  0.58  0.81   < >  1.02   --   0.98   < >  0.84   < >  0.82   < >  0.55   < >  0.83   GFRESTIMATED  68  82   < >   --    < >  >90  Non  GFR Calc    68   < >  53*   --   55*   < >  66   < >  68   < >  >90   < >  68   GFRESTBLACK  82  >90   GFR Calc     < >   --    < >  >90   GFR Calc    83   < >  64   --   67   < >  79   < >  83   < >  >90   < >  82   POTASSIUM  4.2  3.6   < >   --    < >  3.3*  3.5   < >  3.6   --   3.8   < >  4.1   < >  4.1   < >  3.5   < >  4.5    < > = values in this interval not displayed.      BP Readings from Last 3 Encounters:   05/16/17 124/60   04/28/17 102/66   04/11/17 127/74    Wt Readings from Last 3 Encounters:   05/16/17 201 lb (91.2 kg)   04/28/17 203 lb 9 oz (92.3 kg)   04/18/17 200 lb (90.7 kg)                  Labs reviewed in EPIC    Reviewed and updated as needed this visit by clinical staff  Tobacco  Allergies  Meds  Problems  Med Hx  Surg Hx  Fam Hx  Soc Hx        Reviewed and updated as needed this visit by Provider  Allergies  Meds  Problems         ROS:  Constitutional, HEENT, cardiovascular, pulmonary, gi and gu systems are negative, except as otherwise noted.    OBJECTIVE:                                                     /60  Pulse 70  Temp 99.2  F (37.3  C) (Oral)  Resp 14  Wt 201 lb (91.2 kg)  SpO2 97%  Breastfeeding? No  BMI 31.74 kg/m2  Body mass index is 31.74 kg/(m^2).  GENERAL APPEARANCE: healthy, alert and no distress     EYES: PERRL, sclera clear     HENT: nose and mouth without ulcers or lesions     NECK: no adenopathy, no asymmetry, masses, or scars and thyroid normal to palpation     RESP: lungs clear to auscultation - no rales, rhonchi or wheezes     CV: irregularly irregular rhythm, normal rate, no murmur, click or rub      Abdomen: soft, nontender, no HSM or masses and bowel sounds normal     Ext: warm, dry, trace edema bilaterally    Diagnostic Test Results:  Results for orders placed or performed in visit on 05/10/17   INR point of care   Result Value Ref Range    INR Protime 1.8 (A) 0.86 - 1.14     *Note: Due to a large number of results and/or encounters for the requested time period, some results have not been displayed. A complete set of results can be found in Results Review.        ASSESSMENT/PLAN:                                                        ICD-10-CM    1. Acute and chronic respiratory failure with hypoxia (H) J96.21    2. Type 2 diabetes mellitus with hyperglycemia, with long-term current use of insulin (H) E11.65     Z79.4    3. (HFpEF) heart failure with preserved ejection fraction (H) I50.30    4. Iron deficiency anemia due to chronic blood loss D50.0    5. Lower GI bleed K92.2    6. Essential hypertension with goal blood pressure less than 140/90 I10        Still doing better in general s/p hospital stay, off O2 with activities (had been on O2 with activities prior to hospitalization- still unsure how her baseline improved with abx and influenza treatment), though still very tired with activity.  Home health stopped last week, did very nice job of writing out plan for medications.  Pt is now in charge of setting up medications now.  Will have to monitor that closely due  to pt's mild memory issues vs overwhelmed by medial issues.    Diabetes under very poor control when hospitalized last month, with A1C jumping from '9s' to 14s, but glucose readings are mixed, but are looking better on metformin 1000mg/d, lantus 25units/day and novolog 12 units/meal (3x/day).  Will plan to recheck her A1C in the next 2-3 months and will hopefully see significant improvement.    Anemia- Hgb still in the 9's s/p GI bleed last year.  No further blood in stool, though does have nose bleeds.  Dr. Norton - discussed potential w/u with pt, after acute GI sx's, and favored not doing a colonoscopy due to extensive other medical issues.  Wondered if pt would pursue chemo if w/u showed cancer- pt stated she would not- still feels this way.  Now that she is a bit more stable, she is still not interested in working up the low hgb.    F/u in two months, after cardiology visit (hopefully pulm visit, though sounds like that appt may need to be rescheduled).    F/u with DM educator- did not realize that pt had an MTM appt today after our appt scheduled, and pt did not report when we were talking about it.   Called pt to talk about it later- she got mixed up, forgot with running later.  Will think about how she wants to f/u - can do DM educator or MTM.  She may do DM ed first, then f/u with MTM after that for DM/med medication management.  Would like MTM help to make sure pt is continuing to take her meds correctly now that the home health nurse is no longer setting them up for pt.    Will also try to get fasting lipids at her upcoming appts- lipitor started at her 4/17 hospitalization.    Ilene Tristan MD  [facility]       Spent greater than 50% of 40 minutes in face to face time counseling patient regarding medications, why she's taking which medications, utility of GI work-up and diabetes education discussion.

## 2017-05-16 NOTE — TELEPHONE ENCOUNTER
ANTICOAGULATION FOLLOW-UP CLINIC VISIT    Patient Name:  Betty Tee  Date:  5/16/2017  Contact Type:  Telephone    SUBJECTIVE:  Bleeding Signs/Symptoms: None  Thromboembolic Signs/Symptoms: None    Medication Changes:  No  Dietary Changes:  No  Bacterial/Viral Infection:  No    Missed Coumadin Doses:  None  Other Concerns:  No      ASSESSMENT/PLAN:  See: ANTICOAGULATION QIC flow sheet.    INR 1.9  Per CW, Coumadin 5mg Tues, 7.5mg ROW  Recheck INR in 1-2 weeks  CW informed pt of results by phone  Lydia HALEY RN    Dosage adjustment made based on physician directed care plan.    JIMI VOGT

## 2017-05-16 NOTE — TELEPHONE ENCOUNTER
CW will be calling pt and giving instructions.  States MTM asked that CW call pt and f/u with her.  Lydia HALEY RN

## 2017-05-16 NOTE — TELEPHONE ENCOUNTER
Novolog         Last Written Prescription Date: 4/11/2017  Last Fill Quantity: 3ml, # refills: 0  Last Office Visit with G, P or  Health prescribing provider:  5/16/2017   Next 5 appointments (look out 90 days)     May 31, 2017  3:00 PM CDT   Office Visit with Ilene Tristan MD   [facility] ([facility])    [address] [address] [phone]                   BP Readings from Last 3 Encounters:   05/16/17 124/60   04/28/17 102/66   04/11/17 127/74     Lab Results   Component Value Date    MICROL 10 09/20/2016     Lab Results   Component Value Date    UMALCR 21.13 09/20/2016     Creatinine   Date Value Ref Range Status   04/28/2017 0.82 0.52 - 1.04 mg/dL Final   ]  GFR Estimate   Date Value Ref Range Status   04/28/2017 68 >60 mL/min/1.7m2 Final     Comment:     Non  GFR Calc   04/11/2017 82 >60 mL/min/1.7m2 Final     Comment:     Non  GFR Calc   04/10/2017 88 >60 mL/min/1.7m2 Final     Comment:     Non  GFR Calc     GFR Estimate If Black   Date Value Ref Range Status   04/28/2017 82 >60 mL/min/1.7m2 Final     Comment:      GFR Calc   04/11/2017 >90   GFR Calc   >60 mL/min/1.7m2 Final   04/10/2017 >90   GFR Calc   >60 mL/min/1.7m2 Final     Lab Results   Component Value Date    CHOL 153 09/20/2016     Lab Results   Component Value Date    HDL 72 09/20/2016     Lab Results   Component Value Date    LDL 56 09/20/2016     Lab Results   Component Value Date    TRIG 123 09/20/2016     Lab Results   Component Value Date    CHOLHDLRATIO 2.5 08/06/2014     Lab Results   Component Value Date    AST 29 04/02/2017     Lab Results   Component Value Date    ALT 19 04/02/2017     Lab Results   Component Value Date    A1C 14.3 04/04/2017    A1C 9.4 01/24/2017    A1C 7.0 09/02/2016    A1C 5.6 11/04/2011     Potassium   Date Value Ref Range Status   04/28/2017 4.2 3.4 -  5.3 mmol/L Final     Routing refill request to provider for review/approval because:  Labs out of range:  A1C  Melanie Penn RN  [employer]

## 2017-05-16 NOTE — MR AVS SNAPSHOT
After Visit Summary   5/16/2017    Betty Tee    MRN: [id number]           Patient Information     Date Of Birth          1940        Visit Information        Provider Department      5/16/2017 10:30 AM Ilene Tristan MD [facility]        Today's Diagnoses     Type 2 diabetes mellitus without complication, with long-term current use of insulin (H)    -  1       Follow-ups after your visit        Additional Services     DIABETES EDUCATOR REFERRAL       DIABETES SELF MANAGEMENT TRAINING (DSMT)      Your provider has referred you to Diabetes Education: [facility]: Diabetes Education - All [facility] [phone]   (Johanna Freitas if possible) [email or web address]    Type of training and number of hours: Previous Diagnosis: Follow-up DSMT - 2 hours.    Medicare covers: 10 hours of initial DSMT in 12 month period from the time of first visit, plus 2 hours of follow-up DSMT annually, and additional hours as requested for insulin training.    Diabetes Type: Type 2 - On Insulin             Diabetes Co-Morbidities: dyslipidemia, hypertension, mild memory issues, ILD, CHF, a.fib and obesity               A1C Goal:  <8.0       A1C is: Lab Results       Component                Value               Date                       A1C                      14.3                04/04/2017              If an urgent visit is needed or A1C is above 12, Care Team to call the Diabetes Education Team at [phone] or send an In Basket message to the Diabetes Education Pool (P DIAB ED-PATIENT CARE).    Diabetes Education Topics: Comprehensive Knowledge Assessment and Instruction and Knowledge: Healthy Eating, Being Active, Monitoring Blood Sugar, Taking Medication, Problem Solving/Goal Setting and Healthy Coping    Special Educational Needs Requiring Individual DSMT: None       MEDICAL NUTRITION THERAPY (MNT) for Diabetes    Medical Nutrition Therapy  with a Registered Dietitian can be provided in coordination with Diabetes Self-Management Training to assist in achieving optimal diabetes management.    MNT Type and Hours: Previous diagnosis: Annual follow-up MNT - 2 hours (though likely needs to complete initial dx training)                       Medicare will cover: 3 hours initial MNT in 12 month period after first visit, plus 2 hours of follow-up MNT annually    Please be aware that coverage of these services is subject to the terms and limitations of your health insurance plan.  Call member services at your health plan to determine Diabetes Self-Management Training benefits and ask which blood glucose monitor brands are covered by your plan.      Please bring the following with you to your appointment:    (1)  List of current medications   (2)  List of Blood Glucose Monitor brands that are covered by your insurance plan  (3)  Blood Glucose Monitor and log book  (4)   Food records for the 3 days prior to your visit    The Certified Diabetes Educator may make diabetes medication adjustments per the CDE Protocol and Collaborative Practice Agreement.                  Your next 10 appointments already scheduled     May 31, 2017  3:00 PM CDT   Office Visit with Ilene Tristan MD   [facility] ([facility])    [address] [id number] [phone]           Bring a current list of meds and any records pertaining to this visit.  For Physicals, please bring immunization records and any forms needing to be filled out.  Please arrive 10 minutes early to complete paperwork.            Jun 22, 2017  4:30 PM CDT   Lab with  LAB   [facility] ([facility])    [address]  1st Floor  [facility] [id number] [phone]            Jun 22, 2017  5:00 PM CDT   (Arrive by 4:45 PM)   RETURN HEART FAILURE with Radha Rosa MD   [facility] ([facility])    [address] [address]   [phone]            Jul 05, 2017 11:00 AM CDT   PFT VISIT with  PFL B   [facility] ([facility])    [address] [place] [address] [phone]            Jul 05, 2017 11:30 AM CDT   (Arrive by 11:15 AM)   Return Interstitial Lung with Meño Walsh MD   [facility] ([facility])    [address] [place] [id number] [phone]              Who to contact     If you have questions or need follow up information about today's clinic visit or your schedule please contact [facility] directly at [phone].  Normal or non-critical lab and imaging results will be communicated to you by MyChart, letter or phone within 4 business days after the clinic has received the results. If you do not hear from us within 7 days, please contact the clinic through [employer] or phone. If you have a critical or abnormal lab result, we will notify you by phone as soon as possible.  Submit refill requests through [employer] or call your pharmacy and they will forward the refill request to us. Please allow 3 business days for your refill to be completed.          Additional Information About Your Visit        MyChart Information     [employer] gives you secure access to your electronic health record. If you see a primary care provider, you can also send messages to your care team and make appointments. If you have questions, please call your primary care clinic.  If you do not have a primary care provider, please call [phone] and they will assist you.        Care EveryWhere ID     This is your Care EveryWhere ID. This could be used by other organizations to access your [place] medical records  [id number]        Your Vitals Were     Pulse Temperature Respirations Pulse Oximetry  Breastfeeding? BMI (Body Mass Index)    70 99.2  F (37.3  C) (Oral) 14 97% No 31.74 kg/m2       Blood Pressure from Last 3 Encounters:   05/16/17 124/60   04/28/17 102/66   04/11/17 127/74    Weight from Last 3 Encounters:   05/16/17 201 lb (91.2 kg)   04/28/17 203 lb 9 oz (92.3 kg)   04/18/17 200 lb (90.7 kg)              We Performed the Following     DIABETES EDUCATOR REFERRAL          Today's Medication Changes          These changes are accurate as of: 5/16/17 11:57 AM.  If you have any questions, ask your nurse or doctor.               These medicines have changed or have updated prescriptions.        Dose/Directions    acyclovir 5 % ointment   Commonly known as:  ZOVIRAX   This may have changed:  additional instructions   Used for:  Recurrent cold sores        Apply topically 6 times daily   Quantity:  15 g   Refills:  3       fluticasone 50 MCG/ACT spray   Commonly known as:  FLONASE   This may have changed:    - when to take this  - reasons to take this   Used for:  Seasonal allergic rhinitis        Dose:  1-2 spray   Spray 1-2 sprays into both nostrils daily   Quantity:  16 g   Refills:  5       PARoxetine 10 MG tablet   Commonly known as:  PAXIL   This may have changed:    - how much to take  - how to take this  - when to take this  - additional instructions   Used for:  Major depressive disorder, recurrent episode, moderate (H)        TAKE 1 TABLET (10mg) BY MOUTH AT BEDTIME   Quantity:  30 tablet   Refills:  1                Primary Care Provider Office Phone # Fax #    Ilene Tristan [phone]       [facility] [address] [address]        Thank you!     Thank you for choosing [facility]  for your care. Our goal is always to provide you with excellent care. Hearing back from our patients is one way we can continue to improve our services. Please take a few minutes to complete the written survey that you may receive in the mail  after your visit with us. Thank you!             Your Updated Medication List - Protect others around you: Learn how to safely use, store and throw away your medicines at www.disposemymeds.org.          This list is accurate as of: 5/16/17 11:57 AM.  Always use your most recent med list.                   Brand Name Dispense Instructions for use    acyclovir 400 MG tablet    ZOVIRAX     Take 400 mg by mouth See Admin Instructions 5 times daily as needed for outbreaks       acyclovir 5 % ointment    ZOVIRAX    15 g    Apply topically 6 times daily       albuterol 108 (90 BASE) MCG/ACT Inhaler    PROAIR HFA/PROVENTIL HFA/VENTOLIN HFA    1 Inhaler    Inhale 2 puffs into the lungs every 6 hours       atorvastatin 40 MG tablet    LIPITOR    90 tablet    Take 1 tablet (40 mg) by mouth daily       * blood glucose monitoring lancets     4 Box    Use to test blood sugar 4 times daily or as directed.       * blood glucose monitoring lancets     1 Box    Use to test blood sugar 4 times daily or as directed.  Ok to substitute alternative if insurance prefers.       * blood glucose monitoring test strip    no brand specified    300 strip    Use to test blood sugars 4 times daily or as directed       * blood glucose monitoring test strip    ACCU-CHEK SHANNON PLUS    120 strip    Use to test blood sugar 4 times daily or as directed.  Ok to substitute alternative if insurance prefers.       COMPRESSION STOCKINGS     2 each    Wear compression stockings in affected leg (right leg) or both legs most of the time during the day and take them off at night.       fluticasone 220 MCG/ACT Inhaler    FLOVENT HFA    3 Inhaler    Inhale 2 puffs into the lungs 2 times daily       fluticasone 50 MCG/ACT spray    FLONASE    16 g    Spray 1-2 sprays into both nostrils daily       furosemide 40 MG tablet    LASIX    180 tablet    Take 0.5 tablets (20 mg) by mouth 2 times daily       insulin aspart 100 UNIT/ML injection    NovoLOG PEN    3 mL     Inject 10 Units Subcutaneous 3 times daily (with meals)       insulin glargine 100 UNIT/ML injection    LANTUS    3 mL    Inject 23 Units Subcutaneous every morning (before breakfast)       * insulin pen needle 31G X 8 MM    B-D U/F    400 each    Use 4 times daily (with Lantus and Novolog) or as directed.       * insulin pen needle 32G X 4 MM    BD JANE U/F    200 each    Use 4 daily as directed.       ketoconazole 2 % cream    NIZORAL    60 g    Apply topically 2 times daily       lactobacillus rhamnosus (GG) capsule     90 capsule    Take 1 capsule by mouth 3 times daily (before meals)       lisinopril 2.5 MG tablet    PRINIVIL/Zestril    90 tablet    Take 1 tablet (2.5 mg) by mouth daily       metFORMIN 500 MG 24 hr tablet    GLUCOPHAGE-XR    180 tablet    Take 2 tablets (1,000 mg) by mouth daily (with dinner)       metoprolol 100 MG 24 hr tablet    TOPROL-XL    90 tablet    Take 1 tablet (100 mg) by mouth daily       montelukast 10 MG tablet    SINGULAIR    90 tablet    Take 1 tablet (10 mg) by mouth At Bedtime       * order for DME     1 Device    Oxygen: Patient requires supplemental Oxygen 4 LPM via nasal canula with activity. Please provide patient with a home unit and with portability capability. Oxygen will be for a lifetime.       * order for DME     1 Device    Oxygen: Patient requires supplemental Oxygen 4 LPM via nasal canula with activity. Please provide patient with POC to improve mobility, okay to titrate for conserving to keep stats above 90%. Please fax results to [phone].  Oxygen will be for a lifetime.       * pantoprazole 20 MG EC tablet    PROTONIX    60 tablet    Take 1-2 tablets (20-40 mg) by mouth daily       * pantoprazole 40 MG EC tablet    PROTONIX    90 tablet    Take 1 tablet (40 mg) by mouth daily       PARoxetine 10 MG tablet    PAXIL    30 tablet    TAKE 1 TABLET (10mg) BY MOUTH AT BEDTIME       polyethylene glycol Packet    MIRALAX/GLYCOLAX    7 packet    Take 17 g by mouth  daily as needed for constipation       potassium chloride SA 20 MEQ CR tablet    K-DUR/KLOR-CON M    90 tablet    Take 1 tablet (20 mEq) by mouth daily       predniSONE 10 MG tablet    DELTASONE    90 tablet    Take 2 tablets for three days (4/12-14), then take 1 tablet daily (10 mg daily starting 4/15)       spironolactone 25 MG tablet    ALDACTONE    90 tablet    Take 1 tablet (25 mg) by mouth daily       traZODone 50 MG tablet    DESYREL    60 tablet    TAKE 1/2-1 TABLET BY MOUTH NIGHTLY AS NEEDED, CAN TITRATE DOWN TO 1/2TAB OR UP TO 2TABS AS NEEDED       TYLENOL 500 MG tablet   Generic drug:  acetaminophen      Take 500 mg by mouth nightly as needed       warfarin 7.5 MG tablet    COUMADIN    100 tablet    Current dose is 7.5 mg daily.  Dose adjusted per INR result.       * Notice:  This list has 10 medication(s) that are the same as other medications prescribed for you. Read the directions carefully, and ask your doctor or other care provider to review them with you.

## 2017-05-17 ENCOUNTER — TELEPHONE (OUTPATIENT)
Dept: FAMILY MEDICINE | Facility: CLINIC | Age: 77
End: 2017-05-17

## 2017-05-17 DIAGNOSIS — Z53.9 DIAGNOSIS NOT YET DEFINED: Primary | ICD-10-CM

## 2017-05-17 PROCEDURE — G0180 MD CERTIFICATION HHA PATIENT: HCPCS | Performed by: FAMILY MEDICINE

## 2017-05-17 NOTE — TELEPHONE ENCOUNTER
Did you just need me to send the Rx?  If yes, I took care of that. If there is something else, just let me know!    Sabrina Meek, PharmD, JAMES, BCACP  MTM Pharmacist, [facility]

## 2017-05-17 NOTE — TELEPHONE ENCOUNTER
Received form(s) from [facility] [employer] for Plan of care.  Placed form(s) in/on CW's box.  Forms need to be signed/ see med list  and faxed to number listed on form.    Call pt to verify form was sent: No  Copy needs to be sent for scanning after completion: Yes    Sorry- CW  Accidentally closed RIGO Polanco MA

## 2017-05-17 NOTE — TELEPHONE ENCOUNTER
Med list way off- wrote this on form, said to reference our med list- just verified at recent appt.  Form completed - will bring back to team to fax.  Thanks!  CW

## 2017-05-19 ENCOUNTER — TELEPHONE (OUTPATIENT)
Dept: CARDIOLOGY | Facility: CLINIC | Age: 77
End: 2017-05-19

## 2017-05-19 ENCOUNTER — CARE COORDINATION (OUTPATIENT)
Dept: CARDIOLOGY | Facility: CLINIC | Age: 77
End: 2017-05-19

## 2017-05-19 NOTE — PROGRESS NOTES
"Received message below via triage this afternoon.     \"Patient called stating her weight has went from 197 to 203 since last Sunday. She went to an appt with her family doctor and he wanted her to call and report this. You can reach her @ [phone].\"    Called pt. She had been hospitalized at the  recently for fever, respiratory failure and while there was in heart failure and diuresed.  She went to TCU and is home now.  Her weight had been creeping up steadily over the last week and is now up 5 lbs. Her primary physician recommended she call us.  She cannot remember her \"dry\" weight but thinks it is about 193.  We reviewed her diuretics and clarified she is taking lasix 20 mg bid (she has 40 mg tabs and home care nurse was breaking it in half; but she also has 20 mg tabs that she can take 1 tab bid)  She was a little unclear on the exact dose, but according to hospital DC and TCU DC papers she read, she is taking 20 mg bid. Her last labs looked good.  We will increase her am lasix to 40 mg and keep 20 mg in pm for next 3 days.  She will continue to weigh daily and I will call her on Monday to check her weight.  She is agreeable with this plan.      "

## 2017-05-19 NOTE — TELEPHONE ENCOUNTER
Patient called stating her weight has went from 197 to 203 since last Sunday.  She went to an appt with her family doctor and he wanted her to call and report this.  You can reach her @ [phone]

## 2017-05-22 DIAGNOSIS — Z79.4 TYPE 2 DIABETES MELLITUS WITH HYPERGLYCEMIA, WITH LONG-TERM CURRENT USE OF INSULIN (H): Primary | ICD-10-CM

## 2017-05-22 DIAGNOSIS — E11.65 TYPE 2 DIABETES MELLITUS WITH HYPERGLYCEMIA, WITH LONG-TERM CURRENT USE OF INSULIN (H): Primary | ICD-10-CM

## 2017-05-23 RX ORDER — INSULIN GLARGINE 100 [IU]/ML
INJECTION, SOLUTION SUBCUTANEOUS
Qty: 15 ML | Refills: 1 | Status: SHIPPED | OUTPATIENT
Start: 2017-05-23 | End: 2017-10-14

## 2017-05-23 NOTE — TELEPHONE ENCOUNTER
Routing refill request to provider for review/approval because:  Last Rx from outside provider Lenny Greenwood (during hospital visit)  Lydia HALEY RN

## 2017-05-23 NOTE — TELEPHONE ENCOUNTER
Carolee Duffy         Last Written Prescription Date: 4/11/2017  Last Fill Quantity: 3ml, # refills: 1  Last Office Visit with FMG, P or  Health prescribing provider:  05/16/2017   Next 5 appointments (look out 90 days)     May 31, 2017  3:00 PM CDT   Office Visit with Ilene Tristan MD   [facility] ([facility])    [address] [address] [phone]                   BP Readings from Last 3 Encounters:   05/16/17 124/60   04/28/17 102/66   04/11/17 127/74     Lab Results   Component Value Date    MICROL 10 09/20/2016     Lab Results   Component Value Date    UMALCR 21.13 09/20/2016     Creatinine   Date Value Ref Range Status   04/28/2017 0.82 0.52 - 1.04 mg/dL Final   ]  GFR Estimate   Date Value Ref Range Status   04/28/2017 68 >60 mL/min/1.7m2 Final     Comment:     Non  GFR Calc   04/11/2017 82 >60 mL/min/1.7m2 Final     Comment:     Non  GFR Calc   04/10/2017 88 >60 mL/min/1.7m2 Final     Comment:     Non  GFR Calc     GFR Estimate If Black   Date Value Ref Range Status   04/28/2017 82 >60 mL/min/1.7m2 Final     Comment:      GFR Calc   04/11/2017 >90   GFR Calc   >60 mL/min/1.7m2 Final   04/10/2017 >90   GFR Calc   >60 mL/min/1.7m2 Final     Lab Results   Component Value Date    CHOL 153 09/20/2016     Lab Results   Component Value Date    HDL 72 09/20/2016     Lab Results   Component Value Date    LDL 56 09/20/2016     Lab Results   Component Value Date    TRIG 123 09/20/2016     Lab Results   Component Value Date    CHOLHDLRATIO 2.5 08/06/2014     Lab Results   Component Value Date    AST 29 04/02/2017     Lab Results   Component Value Date    ALT 19 04/02/2017     Lab Results   Component Value Date    A1C 14.3 04/04/2017    A1C 9.4 01/24/2017    A1C 7.0 09/02/2016    A1C 5.6 11/04/2011     Potassium   Date Value Ref Range Status   04/28/2017  4.2 3.4 - 5.3 mmol/L Final

## 2017-05-30 ENCOUNTER — ALLIED HEALTH/NURSE VISIT (OUTPATIENT)
Dept: EDUCATION SERVICES | Facility: CLINIC | Age: 77
End: 2017-05-30
Payer: MEDICARE

## 2017-05-30 DIAGNOSIS — F33.1 MAJOR DEPRESSIVE DISORDER, RECURRENT EPISODE, MODERATE (H): ICD-10-CM

## 2017-05-30 DIAGNOSIS — G47.01 INSOMNIA DUE TO MEDICAL CONDITION: ICD-10-CM

## 2017-05-30 DIAGNOSIS — E11.65 TYPE 2 DIABETES MELLITUS WITH HYPERGLYCEMIA, WITH LONG-TERM CURRENT USE OF INSULIN (H): Primary | ICD-10-CM

## 2017-05-30 DIAGNOSIS — Z79.4 TYPE 2 DIABETES MELLITUS WITH HYPERGLYCEMIA, WITH LONG-TERM CURRENT USE OF INSULIN (H): Primary | ICD-10-CM

## 2017-05-30 DIAGNOSIS — J30.2 SEASONAL ALLERGIC RHINITIS: ICD-10-CM

## 2017-05-30 PROCEDURE — G0108 DIAB MANAGE TRN  PER INDIV: HCPCS

## 2017-05-30 NOTE — PATIENT INSTRUCTIONS
1. Injections 2 inches away from navel.  2. Referral for diagnostic 72-hr CGM.  Ideally schedule for sometime this early summer (June).  3. Continue same insulin doses.

## 2017-05-30 NOTE — Clinical Note
I would like to do a 72 hr CGM study with Sister Randal to better evaluate her diet and insulin doses.  I have the order pending.  Can you please sign?  Thanks! Laney Christiansen RD LD CDE

## 2017-05-30 NOTE — TELEPHONE ENCOUNTER
Pending Prescriptions:                       Disp   Refills    fluticasone (FLONASE) 50 MCG/ACT spray [P*16 mL  0            Sig: INSTILL 1 SPRAY IN BOTH NOSTRILS EVERY DAY          Last Written Prescription Date: 08/27/2015  Last Fill Quantity: 1,  # refills: 5   Last Office Visit with G, [facility] or [facility] prescribing provider: 05/16/2017                                         Next 5 appointments (look out 90 days)     May 31, 2017  3:00 PM CDT   Office Visit with Ilene Tristan MD   [facility] ([facility])    [address] [address] [phone]

## 2017-05-30 NOTE — TELEPHONE ENCOUNTER
Pending Prescriptions:                       Disp   Refills    traZODone (DESYREL) 50 MG tablet          60 tab*3            Sig: TAKE 1/2-1 TABLET BY MOUTH NIGHTLY AS NEEDED, CAN           TITRATE DOWN TO 1/2TAB OR UP TO 2TABS AS NEEDED    PARoxetine (PAXIL) 10 MG tablet           30 tab*1            Sig: TAKE 1 TABLET (10mg) BY MOUTH AT BEDTIME           Last Written Prescription Date: Top at Bottom 03/08/2017  Last Fill Quantity: 60,30 # refills: 3, 1  Last Office Visit with FMG, P or [facility] prescribing provider:  05/16/2017   Next 5 appointments (look out 90 days)     May 31, 2017  3:00 PM CDT   Office Visit with Ilene Tristan MD   [facility] ([facility])    [address] [facility] [id number] [phone]                   Last PHQ-9 score on record=   PHQ-9 SCORE 12/30/2016   Total Score -   Total Score MyChart -   Total Score 4       Lab Results   Component Value Date    AST 29 04/02/2017     Lab Results   Component Value Date    ALT 19 04/02/2017

## 2017-05-30 NOTE — MR AVS SNAPSHOT
After Visit Summary   5/30/2017    Betty Tee    MRN: [id number]           Patient Information     Date Of Birth          1940        Visit Information        Provider Department      5/30/2017 1:30 PM  DIABETIC ED RESOURCE [facility]        Care Instructions    1. Injections 2 inches away from navel.  2. Referral for diagnostic 72-hr CGM.  Ideally schedule for sometime this early summer.  3. Continue same insuin doses.          Follow-ups after your visit        Your next 10 appointments already scheduled     May 31, 2017  3:00 PM CDT   Office Visit with Ilene Tristan MD   [facility] ([facility])    [address] [facility] [id number] [phone]           Bring a current list of meds and any records pertaining to this visit.  For Physicals, please bring immunization records and any forms needing to be filled out.  Please arrive 10 minutes early to complete paperwork.            Jun 22, 2017  4:30 PM CDT   Lab with  LAB   [facility] ([facility])    [address] [place] [id number] [phone]            Jun 22, 2017  5:00 PM CDT   (Arrive by 4:45 PM)   RETURN HEART FAILURE with Radha Rosa MD   [facility] ([facility])    [address] [place] [id number] [phone]            Jul 05, 2017 11:00 AM CDT   PFT VISIT with  PFL B   [facility] Pulmonary Function Testing ([facility])    [address] [place] [id number] [phone]            Jul 05, 2017 11:30 AM CDT   (Arrive by 11:15 AM)   Return Interstitial Lung with Meño Walsh MD   [facility] ([facility])    [address] [place] [id number] [phone]              Who to contact     If you have questions  or need follow up information about today's clinic visit or your schedule please contact [facility] directly at [phone].  Normal or non-critical lab and imaging results will be communicated to you by MyChart, letter or phone within 4 business days after the clinic has received the results. If you do not hear from us within 7 days, please contact the clinic through [employer] or phone. If you have a critical or abnormal lab result, we will notify you by phone as soon as possible.  Submit refill requests through [employer] or call your pharmacy and they will forward the refill request to us. Please allow 3 business days for your refill to be completed.          Additional Information About Your Visit        [employer] Information     [employer] gives you secure access to your electronic health record. If you see a primary care provider, you can also send messages to your care team and make appointments. If you have questions, please call your primary care clinic.  If you do not have a primary care provider, please call [phone] and they will assist you.        Care EveryWhere ID     This is your Care EveryWhere ID. This could be used by other organizations to access your [facility] medical records  [id number]         Blood Pressure from Last 3 Encounters:   05/16/17 124/60   04/28/17 102/66   04/11/17 127/74    Weight from Last 3 Encounters:   05/16/17 91.2 kg (201 lb)   04/28/17 92.3 kg (203 lb 9 oz)   04/18/17 90.7 kg (200 lb)              Today, you had the following     No orders found for display         Today's Medication Changes          These changes are accurate as of: 5/30/17  2:26 PM.  If you have any questions, ask your nurse or doctor.               These medicines have changed or have updated prescriptions.        Dose/Directions    acyclovir 5 % ointment   Commonly known as:  ZOVIRAX   This may have changed:  additional instructions   Used for:  Recurrent cold sores        Apply topically 6  times daily   Quantity:  15 g   Refills:  3       fluticasone 50 MCG/ACT spray   Commonly known as:  FLONASE   This may have changed:    - when to take this  - reasons to take this   Used for:  Seasonal allergic rhinitis        Dose:  1-2 spray   Spray 1-2 sprays into both nostrils daily   Quantity:  16 g   Refills:  5       PARoxetine 10 MG tablet   Commonly known as:  PAXIL   This may have changed:    - how much to take  - how to take this  - when to take this  - additional instructions   Used for:  Major depressive disorder, recurrent episode, moderate (H)        TAKE 1 TABLET (10mg) BY MOUTH AT BEDTIME   Quantity:  30 tablet   Refills:  1                Primary Care Provider Office Phone # Fax #    Ilene Tristan [phone]       [facility] [address] [address]        Thank you!     Thank you for choosing [facility]  for your care. Our goal is always to provide you with excellent care. Hearing back from our patients is one way we can continue to improve our services. Please take a few minutes to complete the written survey that you may receive in the mail after your visit with us. Thank you!             Your Updated Medication List - Protect others around you: Learn how to safely use, store and throw away your medicines at www.disposemymeds.org.          This list is accurate as of: 5/30/17  2:26 PM.  Always use your most recent med list.                   Brand Name Dispense Instructions for use    acyclovir 400 MG tablet    ZOVIRAX     Take 400 mg by mouth See Admin Instructions 5 times daily as needed for outbreaks       acyclovir 5 % ointment    ZOVIRAX    15 g    Apply topically 6 times daily       albuterol 108 (90 BASE) MCG/ACT Inhaler    PROAIR HFA/PROVENTIL HFA/VENTOLIN HFA    1 Inhaler    Inhale 2 puffs into the lungs every 6 hours       atorvastatin 40 MG tablet    LIPITOR    90 tablet    Take 1 tablet (40 mg) by mouth  daily       * blood glucose monitoring lancets     4 Box    Use to test blood sugar 4 times daily or as directed.       * blood glucose monitoring lancets     1 Box    Use to test blood sugar 4 times daily or as directed.  Ok to substitute alternative if insurance prefers.       * blood glucose monitoring test strip    no brand specified    300 strip    Use to test blood sugars 4 times daily or as directed       * blood glucose monitoring test strip    ACCU-CHEK SHANNON PLUS    120 strip    Use to test blood sugar 4 times daily or as directed.  Ok to substitute alternative if insurance prefers.       COMPRESSION STOCKINGS     2 each    Wear compression stockings in affected leg (right leg) or both legs most of the time during the day and take them off at night.       fluticasone 220 MCG/ACT Inhaler    FLOVENT HFA    3 Inhaler    Inhale 2 puffs into the lungs 2 times daily       fluticasone 50 MCG/ACT spray    FLONASE    16 g    Spray 1-2 sprays into both nostrils daily       furosemide 40 MG tablet    LASIX    180 tablet    Take 0.5 tablets (20 mg) by mouth 2 times daily       insulin aspart 100 UNIT/ML injection    NovoLOG FLEXPEN    45 mL    Inject 12 Units Subcutaneous 3 times daily (with meals)       * insulin pen needle 31G X 8 MM    B-D U/F    400 each    Use 4 times daily (with Lantus and Novolog) or as directed.       * insulin pen needle 32G X 4 MM    BD JANE U/F    200 each    Use 4 daily as directed.       ketoconazole 2 % cream    NIZORAL    60 g    Apply topically 2 times daily       lactobacillus rhamnosus (GG) capsule     90 capsule    Take 1 capsule by mouth 3 times daily (before meals)       LANTUS SOLOSTAR 100 UNIT/ML injection   Generic drug:  insulin glargine     15 mL    INJECT 25 UNTIS SUBCUTANEOUSLY EVERY DAY       lisinopril 2.5 MG tablet    PRINIVIL/Zestril    90 tablet    Take 1 tablet (2.5 mg) by mouth daily       metFORMIN 500 MG 24 hr tablet    GLUCOPHAGE-XR    180 tablet    Take 2 tablets  (1,000 mg) by mouth daily (with dinner)       metoprolol 100 MG 24 hr tablet    TOPROL-XL    90 tablet    Take 1 tablet (100 mg) by mouth daily       montelukast 10 MG tablet    SINGULAIR    90 tablet    Take 1 tablet (10 mg) by mouth At Bedtime       * order for DME     1 Device    Oxygen: Patient requires supplemental Oxygen 4 LPM via nasal canula with activity. Please provide patient with a home unit and with portability capability. Oxygen will be for a lifetime.       * order for DME     1 Device    Oxygen: Patient requires supplemental Oxygen 4 LPM via nasal canula with activity. Please provide patient with POC to improve mobility, okay to titrate for conserving to keep stats above 90%. Please fax results to [phone].  Oxygen will be for a lifetime.       * pantoprazole 20 MG EC tablet    PROTONIX    60 tablet    Take 1-2 tablets (20-40 mg) by mouth daily       * pantoprazole 40 MG EC tablet    PROTONIX    90 tablet    Take 1 tablet (40 mg) by mouth daily       PARoxetine 10 MG tablet    PAXIL    30 tablet    TAKE 1 TABLET (10mg) BY MOUTH AT BEDTIME       polyethylene glycol Packet    MIRALAX/GLYCOLAX    7 packet    Take 17 g by mouth daily as needed for constipation       potassium chloride SA 20 MEQ CR tablet    K-DUR/KLOR-CON M    90 tablet    Take 1 tablet (20 mEq) by mouth daily       predniSONE 10 MG tablet    DELTASONE    90 tablet    Take 2 tablets for three days (4/12-14), then take 1 tablet daily (10 mg daily starting 4/15)       spironolactone 25 MG tablet    ALDACTONE    90 tablet    Take 1 tablet (25 mg) by mouth daily       traZODone 50 MG tablet    DESYREL    60 tablet    TAKE 1/2-1 TABLET BY MOUTH NIGHTLY AS NEEDED, CAN TITRATE DOWN TO 1/2TAB OR UP TO 2TABS AS NEEDED       TYLENOL 500 MG tablet   Generic drug:  acetaminophen      Take 500 mg by mouth nightly as needed       warfarin 7.5 MG tablet    COUMADIN    100 tablet    Current dose is 7.5 mg daily.  Dose adjusted per INR result.        * Notice:  This list has 10 medication(s) that are the same as other medications prescribed for you. Read the directions carefully, and ask your doctor or other care provider to review them with you.

## 2017-05-30 NOTE — PROGRESS NOTES
Diabetes Self Management Training: Follow-up Visit    Betty Tee presents today for education and evaluation of glucose control related to Type 2 diabetes.    She is accompanied by self    Patient's diabetes management related comments/concerns: none    Patient would like this visit to be focused around the following diabetes-related behaviors and goals: Assistance with making lifestyle changes    ASSESSMENT:  Patient Problem List reviewed for relevant medical history and current medical status.    Current Diabetes Management per Patient:  Taking diabetes medications?   yes:     Diabetes Medication(s)     Biguanides Sig    metFORMIN (GLUCOPHAGE-XR) 500 MG 24 hr tablet Take 2 tablets (1,000 mg) by mouth daily (with dinner)    Insulin Sig    LANTUS SOLOSTAR 100 UNIT/ML soln INJECT 25 UNTIS SUBCUTANEOUSLY EVERY DAY    insulin aspart (NOVOLOG FLEXPEN) 100 UNIT/ML injection Inject 12 Units Subcutaneous 3 times daily (with meals)          *Abbreviated insulin dose documentation key: Insulin Trade Name (hqzlcgwnm-syrez-ysezbz-bedtime) - i.e. Humalog 5-5-5-0 (Humalog 5 units at breakfast, 5 units at lunch, and 5 units at dinner).    Patient glucose self monitoring as follows: three times daily, before meals.   BG meter: Accu-Chek Amanda meter  BG results: fasting glucose- 147, 127, 140, 123,129, 142, 118, 123, 117, 126, 135, 112, 113, 127, pre-lunch glucose- 134, 129, 178, 174, 242, 133, 119, 230 and pre-supper glucose- 166, 178, 232, 150, 94, 157, 119, 240, 136, 133, 169, 143     BG values are: ~50% in goal  Patient's most recent   Lab Results   Component Value Date    A1C 14.3 04/04/2017    is not meeting goal of <7.0    Nutrition:  Patient eats 3 meals per day.  Has been looking at Sugars on nutrition labels.  Eats a bit more when going out with friends.    Breakfast - 1 packet plain oatmeal, splash milk, 1/2 banana, 1 slice toast - butter OR 2 poached eggs, toast OR eggs, hashbrowns, 2 toast - butter,  "coffee  Lunch - meat, cheese OR egg salad on bread  Dinner - meat, starch, veggie (carrots, peppers, cauliflower) OR Reuban OR chicken, dumplings   Snacks - orange (morning snack), nuts    Beverages: coffee    Cultural/Restoration diet restrictions: No     Biggest Challenge to Healthy Eating: portion control and knowing what to eat    Physical Activity:    Type: pool,   Frequency: 1 days/week    Diabetes Complications:  Chronic Complication Prevention: Eyes: exam within in the last year? No  Nerve/Circulation: foot exam within the last year No  Heart Health: BP to goal Yes, LDL to goal Yes, Daily Aspirin No  Dental Health: brushing/flossing regularly Yes, dental exam within last year Yes  Immunizations (flu/pneumonia) up to date? Yes    Vitals:  There were no vitals taken for this visit.  Estimated body mass index is 31.74 kg/(m^2) as calculated from the following:    Height as of 4/28/17: 1.695 m (5' 6.73\").    Weight as of 5/16/17: 91.2 kg (201 lb).   Last 3 BP:   BP Readings from Last 3 Encounters:   05/16/17 124/60   04/28/17 102/66   04/11/17 127/74       History   Smoking Status     Former Smoker     Packs/day: 0.50     Years: 10.00     Types: Cigarettes     Quit date: 8/1/2011   Smokeless Tobacco     Never Used     Comment: 1/2 ppd       Labs:  Lab Results   Component Value Date    A1C 14.3 04/04/2017     Lab Results   Component Value Date     04/28/2017     Lab Results   Component Value Date    LDL 56 09/20/2016     HDL Cholesterol   Date Value Ref Range Status   09/20/2016 72 >49 mg/dL Final   ]  GFR Estimate   Date Value Ref Range Status   04/28/2017 68 >60 mL/min/1.7m2 Final     Comment:     Non  GFR Calc     GFR Estimate If Black   Date Value Ref Range Status   04/28/2017 82 >60 mL/min/1.7m2 Final     Comment:      GFR Calc     Lab Results   Component Value Date    CR 0.82 04/28/2017     No results found for: MICROALBUMIN    Health Beliefs and Attitudes:   Patient " Activation Measure Survey Score:  ELY Score (Last Two) 10/11/2013   ELY Raw Score 41   Activation Score 63.2   ELY Level 3       Stage of Change: ACTION (Actively working towards change)    Progress toward meeting diabetes-related behavioral goals:    GOALS % Met Goal   Healthy Eating     Physical Activity     Monitoring     Medication Taking     Problem Solving     Healthy Coping     Risk Reduction           Diabetes knowledge and skills assessment:     Patient is knowledgeable in diabetes management concepts related to: Healthy Eating, Being Active, Monitoring, Taking Medication and Reducing Risks    Patient needs further education on the following diabetes management concepts: Healthy Eating, Being Active and Reducing Risks    Barriers to Learning Assessment: No Barriers identified    Based on learning assessment above, most appropriate setting for further diabetes education would be: Group class or Individual setting.    INTERVENTION:    Education provided today on:  AADE Self-Care Behaviors:  Healthy Eating: carbohydrate counting and label reading  Monitoring: individual blood glucose targets and frequency of monitoring  Taking Medication: proper site selection and rotation for injections  Reducing Risks: prevention, early diagnostic measures and treatment of complications, foot care, appropriate dental care, annual eye exam, A1C - goals, relating to blood glucose levels, how often to check, lipids levels and goals and blood pressure and goals    Opportunities for ongoing education and support in diabetes-self management were discussed.    Pt verbalized understanding of concepts discussed and recommendations provided today.       Education Materials Provided:  No new materials provided today    PLAN:  See Patient Instructions for co-developed, patient-stated behavior change goals.  AVS printed and provided to patient today.    FOLLOW-UP:  Follow-up as needed (early summer for professional CGM).   Chart routed  to referring provider.    Ongoing plan for education and support: Written resources (magazines, books, etc.), Follow-up visit with diabetes educator in 1-2 months and Follow-up with primary care provider    LUIS Ruvalcaba CDE    Time Spent: 30 minutes  Encounter Type: Individual    Any diabetes medication dose changes were made via the CDE Protocol and Collaborative Practice Agreement with the patient's referring provider and primary care provider. A copy of this encounter was shared with the provider.

## 2017-05-31 ENCOUNTER — OFFICE VISIT (OUTPATIENT)
Dept: FAMILY MEDICINE | Facility: CLINIC | Age: 77
End: 2017-05-31
Payer: MEDICARE

## 2017-05-31 ENCOUNTER — TELEPHONE (OUTPATIENT)
Dept: FAMILY MEDICINE | Facility: CLINIC | Age: 77
End: 2017-05-31
Payer: MEDICARE

## 2017-05-31 VITALS
TEMPERATURE: 97.6 F | WEIGHT: 202 LBS | OXYGEN SATURATION: 97 % | BODY MASS INDEX: 31.71 KG/M2 | DIASTOLIC BLOOD PRESSURE: 68 MMHG | SYSTOLIC BLOOD PRESSURE: 113 MMHG | HEIGHT: 67 IN | HEART RATE: 64 BPM

## 2017-05-31 DIAGNOSIS — J84.9 ILD (INTERSTITIAL LUNG DISEASE) (H): ICD-10-CM

## 2017-05-31 DIAGNOSIS — I48.91 ATRIAL FIBRILLATION WITH CONTROLLED VENTRICULAR RESPONSE (H): ICD-10-CM

## 2017-05-31 DIAGNOSIS — J96.21 ACUTE AND CHRONIC RESPIRATORY FAILURE WITH HYPOXIA (H): ICD-10-CM

## 2017-05-31 DIAGNOSIS — Z79.4 TYPE 2 DIABETES MELLITUS WITH HYPERGLYCEMIA, WITH LONG-TERM CURRENT USE OF INSULIN (H): Primary | ICD-10-CM

## 2017-05-31 DIAGNOSIS — Z79.01 LONG TERM CURRENT USE OF ANTICOAGULANT THERAPY: Primary | ICD-10-CM

## 2017-05-31 DIAGNOSIS — G47.33 OSA (OBSTRUCTIVE SLEEP APNEA): ICD-10-CM

## 2017-05-31 DIAGNOSIS — E11.65 TYPE 2 DIABETES MELLITUS WITH HYPERGLYCEMIA, WITH LONG-TERM CURRENT USE OF INSULIN (H): Primary | ICD-10-CM

## 2017-05-31 DIAGNOSIS — D50.0 IRON DEFICIENCY ANEMIA DUE TO CHRONIC BLOOD LOSS: ICD-10-CM

## 2017-05-31 DIAGNOSIS — K92.2 LOWER GI BLEED: ICD-10-CM

## 2017-05-31 DIAGNOSIS — J81.0 ACUTE EDEMA OF LUNG (H): ICD-10-CM

## 2017-05-31 LAB
ERYTHROCYTE [DISTWIDTH] IN BLOOD BY AUTOMATED COUNT: 21.4 % (ref 10–15)
HCT VFR BLD AUTO: 33 % (ref 35–47)
HGB BLD-MCNC: 9.4 G/DL (ref 11.7–15.7)
INR POINT OF CARE: 1.9 (ref 0.86–1.14)
MCH RBC QN AUTO: 21.1 PG (ref 26.5–33)
MCHC RBC AUTO-ENTMCNC: 28.5 G/DL (ref 31.5–36.5)
MCV RBC AUTO: 74 FL (ref 78–100)
PLATELET # BLD AUTO: 322 10E9/L (ref 150–450)
RBC # BLD AUTO: 4.45 10E12/L (ref 3.8–5.2)
WBC # BLD AUTO: 13.8 10E9/L (ref 4–11)

## 2017-05-31 PROCEDURE — 82728 ASSAY OF FERRITIN: CPT | Performed by: FAMILY MEDICINE

## 2017-05-31 PROCEDURE — 36416 COLLJ CAPILLARY BLOOD SPEC: CPT | Performed by: FAMILY MEDICINE

## 2017-05-31 PROCEDURE — 85027 COMPLETE CBC AUTOMATED: CPT | Performed by: FAMILY MEDICINE

## 2017-05-31 PROCEDURE — 85610 PROTHROMBIN TIME: CPT | Mod: QW | Performed by: FAMILY MEDICINE

## 2017-05-31 PROCEDURE — 99207 ZZC NO CHARGE NURSE ONLY: CPT | Performed by: FAMILY MEDICINE

## 2017-05-31 PROCEDURE — 99215 OFFICE O/P EST HI 40 MIN: CPT | Performed by: FAMILY MEDICINE

## 2017-05-31 RX ORDER — FUROSEMIDE 40 MG
20 TABLET ORAL 2 TIMES DAILY
Qty: 180 TABLET | Refills: 3 | Status: SHIPPED | OUTPATIENT
Start: 2017-05-31 | End: 2017-06-22

## 2017-05-31 RX ORDER — FERROUS SULFATE 325(65) MG
325 TABLET ORAL
Qty: 30 TABLET | Refills: 3 | Status: SHIPPED | OUTPATIENT
Start: 2017-05-31 | End: 2017-10-06

## 2017-05-31 RX ORDER — PAROXETINE 20 MG/1
TABLET, FILM COATED ORAL
Qty: 20 TABLET | Refills: 3 | Status: SHIPPED | OUTPATIENT
Start: 2017-05-31 | End: 2017-09-12 | Stop reason: DRUGHIGH

## 2017-05-31 RX ORDER — FLUTICASONE PROPIONATE 50 MCG
SPRAY, SUSPENSION (ML) NASAL
Refills: 0
Start: 2017-05-31

## 2017-05-31 RX ORDER — TRAZODONE HYDROCHLORIDE 50 MG/1
TABLET, FILM COATED ORAL
Qty: 90 TABLET | Refills: 1 | Status: SHIPPED | OUTPATIENT
Start: 2017-05-31 | End: 2018-01-31

## 2017-05-31 NOTE — TELEPHONE ENCOUNTER
Discussed meds at appt today- she went back up on her paxil dose to 20mg/d due to worsening sx's, no se's at this dose, so will cont at 20mg/d.  Trazodone, uses prn, less when she uses her CPAP machine.  Refills sent.  CW

## 2017-05-31 NOTE — TELEPHONE ENCOUNTER
Furosemide 20 mg tablet    Take 3 tablets by mouth in the am 2 tabs in the pm    [facility] [address] [facility]

## 2017-05-31 NOTE — PATIENT INSTRUCTIONS
Call to check on 7/5/17 pulmonary appt- reschedule if needed.    Paxil- okay to stay at 20mg/day.    Lasix- check at home to see if you are taking 20mg in morning, 40mg in evening.    Anemia- start iron 1-2 tabs daily.  Can make you constipated.    Diabetes- continue to follow with diabetic educator.    Bring in medication list to appointments.    Come fasting to one of your next INR checks and have them check a lipid panel.    Follow-up here in early 7/17.

## 2017-05-31 NOTE — MR AVS SNAPSHOT
After Visit Summary   5/31/2017    Betty Tee    MRN: [id number]           Patient Information     Date Of Birth          1940        Visit Information        Provider Department      5/31/2017 3:00 PM Ilene Tristan MD [facility]        Today's Diagnoses     Type 2 diabetes mellitus with hyperglycemia, with long-term current use of insulin (H)    -  1    Acute and chronic respiratory failure with hypoxia (H)        ILD (interstitial lung disease) (H)        ANGELICA (obstructive sleep apnea)        Lower GI bleed        Atrial fibrillation with controlled ventricular response (H)        Iron deficiency anemia due to chronic blood loss          Care Instructions    Call to check on 7/5/17 pulmonary appt- reschedule if needed.    Paxil- okay to stay at 20mg/day.    Lasix- check at home to see if you are taking 20mg in morning, 40mg in evening.    Anemia- start iron 1-2 tabs daily.  Can make you constipated.    Diabetes- continue to follow with diabetic educator.    Follow-up here in early 7/17.          Follow-ups after your visit        Your next 10 appointments already scheduled     Jun 22, 2017  4:30 PM CDT   Lab with  LAB   [facility] ([facility])    [address] [place] [id number] [phone]            Jun 22, 2017  5:00 PM CDT   (Arrive by 4:45 PM)   RETURN HEART FAILURE with Radha Rosa MD   [facility] ([facility])    [address] [place] [id number] [phone]              Who to contact     If you have questions or need follow up information about today's clinic visit or your schedule please contact [facility] directly at [phone].  Normal or non-critical lab and imaging results will be communicated to you by MyChart, letter or phone within 4 business days after the clinic has received the results. If you do not hear  "from us within 7 days, please contact the clinic through [employer] or phone. If you have a critical or abnormal lab result, we will notify you by phone as soon as possible.  Submit refill requests through [employer] or call your pharmacy and they will forward the refill request to us. Please allow 3 business days for your refill to be completed.          Additional Information About Your Visit        [employer] Information     [employer] gives you secure access to your electronic health record. If you see a primary care provider, you can also send messages to your care team and make appointments. If you have questions, please call your primary care clinic.  If you do not have a primary care provider, please call [phone] and they will assist you.        Care EveryWhere ID     This is your Care EveryWhere ID. This could be used by other organizations to access your [facility] medical records  [id number]        Your Vitals Were     Pulse Temperature Height Pulse Oximetry Breastfeeding? BMI (Body Mass Index)    64 97.6  F (36.4  C) (Tympanic) 5' 6.73\" (1.695 m) 97% No 31.89 kg/m2       Blood Pressure from Last 3 Encounters:   05/31/17 113/68   05/16/17 124/60   04/28/17 102/66    Weight from Last 3 Encounters:   05/31/17 202 lb (91.6 kg)   05/16/17 201 lb (91.2 kg)   04/28/17 203 lb 9 oz (92.3 kg)              We Performed the Following     CBC with platelets     Ferritin     INR point of care          Today's Medication Changes          These changes are accurate as of: 5/31/17  3:59 PM.  If you have any questions, ask your nurse or doctor.               Start taking these medicines.        Dose/Directions    ferrous sulfate 325 (65 FE) MG tablet   Commonly known as:  IRON   Used for:  Iron deficiency anemia due to chronic blood loss   Started by:  Ilene Tristan MD        Dose:  325 mg   Take 1 tablet (325 mg) by mouth daily (with breakfast)   Quantity:  30 tablet   Refills:  3         These medicines have changed " or have updated prescriptions.        Dose/Directions    acyclovir 5 % ointment   Commonly known as:  ZOVIRAX   This may have changed:  additional instructions   Used for:  Recurrent cold sores        Apply topically 6 times daily   Quantity:  15 g   Refills:  3       fluticasone 50 MCG/ACT spray   Commonly known as:  FLONASE   This may have changed:    - when to take this  - reasons to take this   Used for:  Seasonal allergic rhinitis        Dose:  1-2 spray   Spray 1-2 sprays into both nostrils daily   Quantity:  16 g   Refills:  5       PARoxetine 10 MG tablet   Commonly known as:  PAXIL   This may have changed:    - how much to take  - how to take this  - when to take this  - additional instructions   Used for:  Major depressive disorder, recurrent episode, moderate (H)        TAKE 1 TABLET (10mg) BY MOUTH AT BEDTIME   Quantity:  30 tablet   Refills:  1            Where to get your medicines      These medications were [id number] [place]  [address] [address]     Phone:  [phone]     ferrous sulfate 325 (65 FE) MG tablet    furosemide 40 MG tablet                Primary Care Provider Office Phone # Fax #    Ilene Tristan [phone]       [address] [address]        Thank you!     Thank you for choosing [facility]  for your care. Our goal is always to provide you with excellent care. Hearing back from our patients is one way we can continue to improve our services. Please take a few minutes to complete the written survey that you may receive in the mail after your visit with us. Thank you!             Your Updated Medication List - Protect others around you: Learn how to safely use, store and throw away your medicines at www.disposemymeds.org.          This list is accurate as of: 5/31/17  3:59 PM.  Always use your most recent med list.                    Brand Name Dispense Instructions for use    acyclovir 400 MG tablet    ZOVIRAX     Take 400 mg by mouth See Admin Instructions 5 times daily as needed for outbreaks       acyclovir 5 % ointment    ZOVIRAX    15 g    Apply topically 6 times daily       albuterol 108 (90 BASE) MCG/ACT Inhaler    PROAIR HFA/PROVENTIL HFA/VENTOLIN HFA    1 Inhaler    Inhale 2 puffs into the lungs every 6 hours       atorvastatin 40 MG tablet    LIPITOR    90 tablet    Take 1 tablet (40 mg) by mouth daily       * blood glucose monitoring lancets     4 Box    Use to test blood sugar 4 times daily or as directed.       * blood glucose monitoring lancets     1 Box    Use to test blood sugar 4 times daily or as directed.  Ok to substitute alternative if insurance prefers.       * blood glucose monitoring test strip    no brand specified    300 strip    Use to test blood sugars 4 times daily or as directed       * blood glucose monitoring test strip    ACCU-CHEK SHANNON PLUS    120 strip    Use to test blood sugar 4 times daily or as directed.  Ok to substitute alternative if insurance prefers.       COMPRESSION STOCKINGS     2 each    Wear compression stockings in affected leg (right leg) or both legs most of the time during the day and take them off at night.       ferrous sulfate 325 (65 FE) MG tablet    IRON    30 tablet    Take 1 tablet (325 mg) by mouth daily (with breakfast)       fluticasone 220 MCG/ACT Inhaler    FLOVENT HFA    3 Inhaler    Inhale 2 puffs into the lungs 2 times daily       fluticasone 50 MCG/ACT spray    FLONASE    16 g    Spray 1-2 sprays into both nostrils daily       furosemide 40 MG tablet    LASIX    180 tablet    Take 0.5 tablets (20 mg) by mouth 2 times daily       insulin aspart 100 UNIT/ML injection    NovoLOG FLEXPEN    45 mL    Inject 12 Units Subcutaneous 3 times daily (with meals)       * insulin pen needle 31G X 8 MM    B-D U/F    400 each    Use 4 times daily (with Lantus and Novolog) or  as directed.       * insulin pen needle 32G X 4 MM    BD JANE U/F    200 each    Use 4 daily as directed.       ketoconazole 2 % cream    NIZORAL    60 g    Apply topically 2 times daily       lactobacillus rhamnosus (GG) capsule     90 capsule    Take 1 capsule by mouth 3 times daily (before meals)       LANTUS SOLOSTAR 100 UNIT/ML injection   Generic drug:  insulin glargine     15 mL    INJECT 25 UNTIS SUBCUTANEOUSLY EVERY DAY       lisinopril 2.5 MG tablet    PRINIVIL/Zestril    90 tablet    Take 1 tablet (2.5 mg) by mouth daily       metFORMIN 500 MG 24 hr tablet    GLUCOPHAGE-XR    180 tablet    Take 2 tablets (1,000 mg) by mouth daily (with dinner)       metoprolol 100 MG 24 hr tablet    TOPROL-XL    90 tablet    Take 1 tablet (100 mg) by mouth daily       montelukast 10 MG tablet    SINGULAIR    90 tablet    Take 1 tablet (10 mg) by mouth At Bedtime       * order for DME     1 Device    Oxygen: Patient requires supplemental Oxygen 4 LPM via nasal canula with activity. Please provide patient with a home unit and with portability capability. Oxygen will be for a lifetime.       * order for DME     1 Device    Oxygen: Patient requires supplemental Oxygen 4 LPM via nasal canula with activity. Please provide patient with POC to improve mobility, okay to titrate for conserving to keep stats above 90%. Please fax results to [phone].  Oxygen will be for a lifetime.       * pantoprazole 20 MG EC tablet    PROTONIX    60 tablet    Take 1-2 tablets (20-40 mg) by mouth daily       * pantoprazole 40 MG EC tablet    PROTONIX    90 tablet    Take 1 tablet (40 mg) by mouth daily       PARoxetine 10 MG tablet    PAXIL    30 tablet    TAKE 1 TABLET (10mg) BY MOUTH AT BEDTIME       polyethylene glycol Packet    MIRALAX/GLYCOLAX    7 packet    Take 17 g by mouth daily as needed for constipation       potassium chloride SA 20 MEQ CR tablet    K-DUR/KLOR-CON M    90 tablet    Take 1 tablet (20 mEq) by mouth daily        predniSONE 10 MG tablet    DELTASONE    90 tablet    Take 2 tablets for three days (4/12-14), then take 1 tablet daily (10 mg daily starting 4/15)       spironolactone 25 MG tablet    ALDACTONE    90 tablet    Take 1 tablet (25 mg) by mouth daily       traZODone 50 MG tablet    DESYREL    60 tablet    TAKE 1/2-1 TABLET BY MOUTH NIGHTLY AS NEEDED, CAN TITRATE DOWN TO 1/2TAB OR UP TO 2TABS AS NEEDED       TYLENOL 500 MG tablet   Generic drug:  acetaminophen      Take 500 mg by mouth nightly as needed       warfarin 7.5 MG tablet    COUMADIN    100 tablet    Current dose is 7.5 mg daily.  Dose adjusted per INR result.       * Notice:  This list has 10 medication(s) that are the same as other medications prescribed for you. Read the directions carefully, and ask your doctor or other care provider to review them with you.

## 2017-05-31 NOTE — TELEPHONE ENCOUNTER
ANTICOAGULATION FOLLOW-UP CLINIC VISIT    Patient Name:  Betty Tee  Date:  5/31/2017  Contact Type:  Telephone    SUBJECTIVE:  Bleeding Signs/Symptoms: None  Thromboembolic Signs/Symptoms: None    Medication Changes:  No  Dietary Changes:  No  Bacterial/Viral Infection:  No    Missed Coumadin Doses:  None  Other Concerns:  No      ASSESSMENT/PLAN:  See: ANTICOAGULATION QIC flow sheet.    Left VM to increase dose slightly to 7.5 mg daily, for total weekly dose of 52.5 mg, recheck INR 1-2 weeks, also reminded pt to come fasting at next INR recheck, Dr. Tristan would like to check some other labs while she is here. If further questions call back to triage.    Dosage adjustment made based on physician directed care plan.    JIMI TRISTAN

## 2017-05-31 NOTE — NURSING NOTE
"Chief Complaint   Patient presents with     Recheck Medication     flonase       Initial /68 (BP Location: Left arm, Patient Position: Chair, Cuff Size: Adult Regular)  Pulse 64  Temp 97.6  F (36.4  C) (Tympanic)  Ht 5' 6.73\" (1.695 m)  Wt 202 lb (91.6 kg)  SpO2 97%  Breastfeeding? No  BMI 31.89 kg/m2 Estimated body mass index is 31.89 kg/(m^2) as calculated from the following:    Height as of this encounter: 5' 6.73\" (1.695 m).    Weight as of this encounter: 202 lb (91.6 kg).  Medication Reconciliation: complete   Sujata Gutierrez- TOM      "

## 2017-05-31 NOTE — PROGRESS NOTES
SUBJECTIVE:                                                    Betty Tee is a 77 year old female who presents to clinic today for the following health issues:    Medication Followup of Trazodone, and Paxil     Taking Medication as prescribed: yes    Side Effects:  None    Medication Helping Symptoms:  yes     A.fib-  INR- 5/16/17- 1.9.  Recheck in a week.  More oozing with DM pricks- nothing significant.  Will recheck today.    DM--  Went and saw the new DM Ed- yesterday-   Left her machine at home, but had the written numbers to discuss.  Gets so hungry, just has a handful of nuts, so rec cheese as well.    CHF/Dizziness-  Feels dizzier than usual- gets up and turns, feels it when she turns.  Mentioned the msg from Cardiology - with wt gain, rec increasing lasix from 20mg BID to 40mg at night, keeping 20mg in morning.  Pt doesn't recall this at first, but then recalled, and does think she increased the dose to 40mg at night...  Wt has been staying at 202 lbs- didn't go down.    Mentioned concern about her doing her medications, possible confusion with setting them out, remembering all.  Pt feels that she's doing okay, and that her friend Nghia will be able to help her soon.  Nghia is coming to the end of her term, and after that, she'll be able to help pt with her meds.  Will bring in her med list every time to appts to verify that they look correct.  Memory- pt notes that she 'blanks out' when asked a question at clinic, but then remembers easily when she gets home.    COPD/CHF/Breathing issues- bit worse again, similar to prior to going into hospital when she used oxygen with errands.  Little harder going from point A to B.  Used the O2 on Sunday at [employer], which was helpful.  Starting to take with her on activities with stairs, yesterday, going to brother's nursing home (on hospice) - used it.    Brother has Parkinsons, CHF, a.fib, DM.      MDD-  Paxil- has gone down to 10mg/d (from 20mg/d) through  discussion with this provider, after concerns from pharmacists at TCU or home.  Notes that she started to feel much worse on the lower dose- more agitated with friends, irritable with them.  No significant anxiety sx's.  Mood is irritable.  She went back up to a full tab, 20mg/d, ~2 wks ago, and would like to stay at that dose again.    Insomnia-  Trazodone- has it if she needs it at night.  For awhile, wasn't using her CPAP.    When she uses it, she falls asleep more easily.  Still takes it off at some point during the night.  Starting with it on most nights now.    Anemia- h/o GI bleed, no sx's now, but hgb staying in 9's.        Problem list and histories reviewed & adjusted, as indicated.  Additional history: as documented    Patient Active Problem List   Diagnosis     Temporomandibular joint disorder     Diverticulitis of colon     Disorder of bone and cartilage     Osteoarthritis     Alcohol abuse, in remission     Restless legs syndrome (RLS)     Major depressive disorder, recurrent episode, moderate     Essential hypertension with goal blood pressure less than 140/90     Sciatica     Advanced directives, counseling/discussion     Colouterine fistula     Atrial fibrillation with controlled ventricular response (H)     Left ventricular hypertrophy     Health Care Home     Insomnia     Joint pains     Allergic reaction caused by a drug - likely plaquenil     Breast fibroadenoma     DVT (deep venous thrombosis) (H)     Fatty liver disease, nonalcoholic     Rib pain     Neck mass     Gastroesophageal reflux disease without esophagitis     Enlarged lymph node     Sjogren's syndrome (H)     ANGELICA (obstructive sleep apnea)     ILD (interstitial lung disease) (H)     Lower GI bleed     Acute and chronic respiratory failure with hypoxia (H)     (HFpEF) heart failure with preserved ejection fraction (H)     Type 2 diabetes mellitus with hyperglycemia, with long-term current use of insulin (H)     Heart failure, chronic,  with acute decompensation (H)     Hyperlipidemia LDL goal <100      Current Outpatient Prescriptions   Medication Sig Dispense Refill     ferrous sulfate (IRON) 325 (65 FE) MG tablet Take 1 tablet (325 mg) by mouth daily (with breakfast) 30 tablet 3     LANTUS SOLOSTAR 100 UNIT/ML soln INJECT 25 UNTIS SUBCUTANEOUSLY EVERY DAY 15 mL 1     insulin aspart (NOVOLOG FLEXPEN) 100 UNIT/ML injection Inject 12 Units Subcutaneous 3 times daily (with meals) 45 mL 0     lisinopril (PRINIVIL/ZESTRIL) 2.5 MG tablet Take 1 tablet (2.5 mg) by mouth daily 90 tablet 1     potassium chloride SA (K-DUR/KLOR-CON M) 20 MEQ CR tablet Take 1 tablet (20 mEq) by mouth daily 90 tablet 0     atorvastatin (LIPITOR) 40 MG tablet Take 1 tablet (40 mg) by mouth daily 90 tablet 1     lactobacillus rhamnosus, GG, (CULTURELL) capsule Take 1 capsule by mouth 3 times daily (before meals) 90 capsule 1     pantoprazole (PROTONIX) 40 MG EC tablet Take 1 tablet (40 mg) by mouth daily 90 tablet 1     blood glucose monitoring (NO BRAND SPECIFIED) test strip Use to test blood sugars 4 times daily or as directed 300 strip 3     blood glucose monitoring (ACCU-CHEK MULTICLIX) lancets Use to test blood sugar 4 times daily or as directed. 4 Box 3     insulin pen needle (B-D U/F) 31G X 8 MM Use 4 times daily (with Lantus and Novolog) or as directed. 400 each 3     insulin pen needle (BD JANE U/F) 32G X 4 MM Use 4 daily as directed. 200 each 11     blood glucose monitoring (ACCU-CHEK SHANNON PLUS) test strip Use to test blood sugar 4 times daily or as directed.  Ok to substitute alternative if insurance prefers. 120 strip 11     blood glucose monitoring (ACCU-CHEK FASTCLIX) lancets Use to test blood sugar 4 times daily or as directed.  Ok to substitute alternative if insurance prefers. 1 Box 11     predniSONE (DELTASONE) 10 MG tablet Take 2 tablets for three days (4/12-14), then take 1 tablet daily (10 mg daily starting 4/15) 90 tablet 3     metFORMIN (GLUCOPHAGE-XR)  500 MG 24 hr tablet Take 2 tablets (1,000 mg) by mouth daily (with dinner) 180 tablet 0     order for DME Oxygen: Patient requires supplemental Oxygen 4 LPM via nasal canula with activity. Please provide patient with POC to improve mobility, okay to titrate for conserving to keep stats above 90%. Please fax results to [phone].  Oxygen will be for a lifetime. 1 Device 0     pantoprazole (PROTONIX) 20 MG EC tablet Take 1-2 tablets (20-40 mg) by mouth daily 60 tablet 1     ketoconazole (NIZORAL) 2 % cream Apply topically 2 times daily 60 g 1     metoprolol (TOPROL-XL) 100 MG 24 hr tablet Take 1 tablet (100 mg) by mouth daily 90 tablet 3     spironolactone (ALDACTONE) 25 MG tablet Take 1 tablet (25 mg) by mouth daily 90 tablet 3     montelukast (SINGULAIR) 10 MG tablet Take 1 tablet (10 mg) by mouth At Bedtime 90 tablet 1     albuterol (PROAIR HFA, PROVENTIL HFA, VENTOLIN HFA) 108 (90 BASE) MCG/ACT inhaler Inhale 2 puffs into the lungs every 6 hours 1 Inhaler 3     order for DME Oxygen: Patient requires supplemental Oxygen 4 LPM via nasal canula with activity. Please provide patient with a home unit and with portability capability. Oxygen will be for a lifetime. 1 Device 0     polyethylene glycol (MIRALAX/GLYCOLAX) packet Take 17 g by mouth daily as needed for constipation 7 packet 0     warfarin (COUMADIN) 7.5 MG tablet Current dose is 7.5 mg daily.  Dose adjusted per INR result. 100 tablet 3     acyclovir (ZOVIRAX) 5 % ointment Apply topically 6 times daily (Patient taking differently: Apply topically 6 times daily As needed for outbreaks) 15 g 3     fluticasone (FLOVENT HFA) 220 MCG/ACT inhaler Inhale 2 puffs into the lungs 2 times daily 3 Inhaler 3     acyclovir (ZOVIRAX) 400 MG tablet Take 400 mg by mouth See Admin Instructions 5 times daily as needed for outbreaks       fluticasone (FLONASE) 50 MCG/ACT nasal spray Spray 1-2 sprays into both nostrils daily (Patient taking differently: Spray 1-2 sprays into  both nostrils daily as needed for allergies ) 16 g 5     COMPRESSION STOCKINGS Wear compression stockings in affected leg (right leg) or both legs most of the time during the day and take them off at night. 2 each 2     acetaminophen (TYLENOL) 500 MG tablet Take 500 mg by mouth nightly as needed        traZODone (DESYREL) 50 MG tablet TAKE 1/2-1 TABLET BY MOUTH NIGHTLY AS NEEDED, CAN TITRATE DOWN TO 1/2TAB OR UP TO 2TABS AS NEEDED 90 tablet 1     PARoxetine (PAXIL) 20 MG tablet TAKE 1 TABLET (10mg) BY MOUTH AT BEDTIME 20 tablet 3     furosemide (LASIX) 40 MG tablet Take 0.5 tablets (20 mg) by mouth 2 times daily 180 tablet 3     Allergies   Allergen Reactions     Augmentin Nausea and Vomiting     Codeine Nausea and Vomiting     Phenobarbital Itching     Recent Labs   Lab Test  04/28/17   1504  04/11/17   0651   04/04/17   0650   04/02/17   2147  01/24/17   1338   11/03/16   1136  09/20/16   1045  09/02/16   1448   07/19/16   1254   08/06/14   1116   11/14/11   0626   05/03/11   0823   A1C   --    --    --   14.3*   --    --   9.4*   --    --    --   7.0*   --    --    --    --    --    --    < >   --    LDL   --    --    --    --    --    --    --    --    --   56   --    --    --    --   59   --    --    --   95   HDL   --    --    --    --    --    --    --    --    --   72   --    --    --    --   57   --    --    --   70   TRIG   --    --    --    --    --    --    --    --    --   123   --    --    --    --   129   --   72   < >  73   ALT   --    --    --    --    --   19   --    --   21   --    --    --   51*   < >  52*   < >  14   < >   --    CR  0.82  0.69   < >   --    < >  0.58  0.81   < >  1.02   --   0.98   < >  0.84   < >  0.82   < >  0.55   < >  0.83   GFRESTIMATED  68  82   < >   --    < >  >90  Non  GFR Calc    68   < >  53*   --   55*   < >  66   < >  68   < >  >90   < >  68   GFRESTBLACK  82  >90   GFR Calc     < >   --    < >  >90   GFR Calc    83    "< >  64   --   67   < >  79   < >  83   < >  >90   < >  82   POTASSIUM  4.2  3.6   < >   --    < >  3.3*  3.5   < >  3.6   --   3.8   < >  4.1   < >  4.1   < >  3.5   < >  4.5    < > = values in this interval not displayed.      BP Readings from Last 3 Encounters:   05/31/17 113/68   05/16/17 124/60   04/28/17 102/66    Wt Readings from Last 3 Encounters:   05/31/17 202 lb (91.6 kg)   05/16/17 201 lb (91.2 kg)   04/28/17 203 lb 9 oz (92.3 kg)           Labs reviewed in EPIC    Reviewed and updated as needed this visit by clinical staff  Tobacco  Allergies  Meds  Problems  Soc Hx      Reviewed and updated as needed this visit by Provider  Allergies  Meds  Problems         ROS:  Constitutional, HEENT, cardiovascular, pulmonary, gi and gu systems are negative, except as otherwise noted.    OBJECTIVE:                                                    /68 (BP Location: Left arm, Patient Position: Chair, Cuff Size: Adult Regular)  Pulse 64  Temp 97.6  F (36.4  C) (Tympanic)  Ht 5' 6.73\" (1.695 m)  Wt 202 lb (91.6 kg)  SpO2 97%  Breastfeeding? No  BMI 31.89 kg/m2  Body mass index is 31.89 kg/(m^2).  GENERAL APPEARANCE: pleasant, alert and no distress     EYES: PERRL, sclera clear     HENT: nose and mouth without ulcers or lesions     NECK: no adenopathy, no asymmetry, masses, or scars and thyroid normal to palpation     RESP: lungs clear to auscultation - no rales, rhonchi or wheezes     CV: regular rates and rhythm, normal S1 S2, no S3 or S4 and no murmur, click or rub      Abdomen: soft, nontender, no HSM or masses and bowel sounds normal     Ext: warm, dry, no edema      Psych: full range affect, normal speech and grooming, judgement and insight intact     Diagnostic Test Results:  Results for orders placed or performed in visit on 05/31/17   CBC with platelets   Result Value Ref Range    WBC 13.8 (H) 4.0 - 11.0 10e9/L    RBC Count 4.45 3.8 - 5.2 10e12/L    Hemoglobin 9.4 (L) 11.7 - 15.7 g/dL    " Hematocrit 33.0 (L) 35.0 - 47.0 %    MCV 74 (L) 78 - 100 fl    MCH 21.1 (L) 26.5 - 33.0 pg    MCHC 28.5 (L) 31.5 - 36.5 g/dL    RDW 21.4 (H) 10.0 - 15.0 %    Platelet Count 322 150 - 450 10e9/L   Ferritin   Result Value Ref Range    Ferritin 18 8 - 252 ng/mL     *Note: Due to a large number of results and/or encounters for the requested time period, some results have not been displayed. A complete set of results can be found in Results Review.        ASSESSMENT/PLAN:                                                        ICD-10-CM    1. Type 2 diabetes mellitus with hyperglycemia, with long-term current use of insulin (H) E11.65     Z79.4    2. Acute and chronic respiratory failure with hypoxia (H) J96.21    3. ILD (interstitial lung disease) (H) J84.9    4. ANGELICA (obstructive sleep apnea) G47.33    5. Lower GI bleed K92.2    6. Atrial fibrillation with controlled ventricular response (H) I48.91 INR point of care   7. Iron deficiency anemia due to chronic blood loss D50.0 ferrous sulfate (IRON) 325 (65 FE) MG tablet     CBC with platelets     Ferritin     DM- cont f/u with DM Educator.  Cont current meds.  Lipids- statin started in hospital in 4/17- will try and come in fasting for an INR check coming up.  HTN- low/normal BP, possibly due to increased lasix to control her weight as below.    CHF/dizziness- lasix increased from cardiology after last appt- now 20mg qam 40mg qpm - wt stable but did not decrease.  She should check at home to make sure she's at that dose.  If more dizzy, may need to decrease dose again (mild sx's now).  Glad she has a cardiology f/u appt coming up in a few weeks (6/22/17).    ILD/breathing/recent hospitalization for acute respiratory failure due to pneumonia/influenza- pt was discharged off O2, but is now back to using it for some errands, and is finding it helpful.  Should be having pulmonary appt coming up, though pt thinks they've called to try and reschedule it.  Asked her to call asap  to make sure it's scheduled.    Anemia- will recheck CBC and ferritin today- hgb has been stable in 9's- will start daily iron- 1-2 tabs daily.    MDD- irritability with the lower paxil dose of 10mg/d, went back up to 20mg on her own 2 wks ago.  Too soon to tell if helpful- still crabby.    Insomnia- better symptom control with CPAP use, not really needing trazodone much which is great.  Cont good CPAP use.    A.fib - INR- recheck today.    RTC early 7/17.  Cont 45 minute appts.    Ilene Tristan MD  [facility]      Patient Instructions   Call to check on 7/5/17 pulmonary appt- reschedule if needed.    Paxil- okay to stay at 20mg/day.    Lasix- check at home to see if you are taking 20mg in morning, 40mg in evening.    Anemia- start iron 1-2 tabs daily.  Can make you constipated.    Diabetes- continue to follow with diabetic educator.    Bring in medication list to appointments.    Come fasting to one of your next INR checks and have them check a lipid panel.    Follow-up here in early 7/17.      Addendum--  Notes Recorded by Lydia Araujo RN on 6/5/2017 at 1:24 PM  KP BATES:  Reviewed results with patient.  She has not starting taking iron, but will start it now.  No infection symptoms she can pinpoint (states she has had small increase in phlegm in the AM, but normally has phlegm, denies URI symptoms, urinary symptoms, fever, etc.). Did c/o sweats as well; states she is in the air conditioning and is currently hot, but overall no s/s. States she is actually feeling pretty good. She will continue to monitor and callback/schedule appt if needed  Lydia HALEY RN    ------    Notes Recorded by Ilene Tristan MD on 6/5/2017 at 1:06 PM  Triage-   Can you call pt re: results?  --Hgb still low, but stable.  Had recommended starting iron at her recent visit- can you check to see that she's done that?  --White count was a bit high, and it's usually been normal.  Can be a sign of infection.  Can  you check to see if she's having any infection symptoms?  If so, should see her again soon.  If not, we can recheck it at her next appt.  Would remind her to bring in meds and/or med list to her appts.  Thanks!  CW             Ref Range & Units 5d ago   1yr ago

## 2017-06-01 LAB — FERRITIN SERPL-MCNC: 18 NG/ML (ref 8–252)

## 2017-06-05 NOTE — PROGRESS NOTES
Triage-   Can you call pt re: results?  --Hgb still low, but stable.  Had recommended starting iron at her recent visit- can you check to see that she's done that?  --White count was a bit high, and it's usually been normal.  Can be a sign of infection.  Can you check to see if she's having any infection symptoms?  If so, should see her again soon.  If not, we can recheck it at her next appt.  Would remind her to bring in meds and/or med list to her appts.  Thanks!  CW

## 2017-06-19 ENCOUNTER — PRE VISIT (OUTPATIENT)
Dept: CARDIOLOGY | Facility: CLINIC | Age: 77
End: 2017-06-19

## 2017-06-19 DIAGNOSIS — I48.91 ATRIAL FIBRILLATION WITH CONTROLLED VENTRICULAR RESPONSE (H): Primary | ICD-10-CM

## 2017-06-19 DIAGNOSIS — J96.21 ACUTE AND CHRONIC RESPIRATORY FAILURE WITH HYPOXIA (H): ICD-10-CM

## 2017-06-19 DIAGNOSIS — I50.33 ACUTE ON CHRONIC DIASTOLIC HEART FAILURE (H): ICD-10-CM

## 2017-06-21 ENCOUNTER — OFFICE VISIT (OUTPATIENT)
Dept: PULMONOLOGY | Facility: CLINIC | Age: 77
End: 2017-06-21
Attending: INTERNAL MEDICINE
Payer: MEDICARE

## 2017-06-21 VITALS
OXYGEN SATURATION: 96 % | BODY MASS INDEX: 31.58 KG/M2 | HEART RATE: 92 BPM | DIASTOLIC BLOOD PRESSURE: 77 MMHG | WEIGHT: 200 LBS | SYSTOLIC BLOOD PRESSURE: 130 MMHG | RESPIRATION RATE: 16 BRPM

## 2017-06-21 DIAGNOSIS — J84.9 ILD (INTERSTITIAL LUNG DISEASE) (H): Primary | ICD-10-CM

## 2017-06-21 LAB
6 MIN WALK (FT): 740 FT
6 MIN WALK (M): 226 M

## 2017-06-21 PROCEDURE — 99212 OFFICE O/P EST SF 10 MIN: CPT | Mod: ZF

## 2017-06-21 RX ORDER — PREDNISONE 1 MG/1
1 TABLET ORAL DAILY
Qty: 30 TABLET | Refills: 3 | Status: SHIPPED | OUTPATIENT
Start: 2017-06-21 | End: 2017-08-30

## 2017-06-21 ASSESSMENT — PAIN SCALES - GENERAL: PAINLEVEL: NO PAIN (0)

## 2017-06-21 NOTE — PROGRESS NOTES
[facility]   Pulmonary Clinic Follow Up Note  June 21, 2017      Betty Tee MRN# [id number]   Age: 77 year old YOB: 1940     Date of Consultation: June 21, 2017    Primary care provider: Ilene Tristan     History taken from; Patient      Betty Tee is a 77 year old female seen in the Pulmonary Clinic  for f/u.  Chief Complaint   Patient presents with     Interstitial Lung Disease (ILD)     Patient is being seen for ILD follow up   .          Pulmonary Problem List / Reason for follow up:   1. ILD  2. 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  The patient is stable.   1.  Interstitial lung disease.  We will try to decrease the prednisone for 1 mg every 2 weeks.  The patient will need to see Rheumatology for further management of Sjogren Disease.   2.  Bronchiolitis obliterans.  We will continue with Flovent and albuterol on an as needed basis.  The patient will also be referred to pulmonary rehabilitation.   3.  Lung nodules.  The patient is found on the most recent CT to have a lung nodule of 2 mm and further followup is not needed.        We explained the plan to the patient including the risks and benefits.  The patient expressed understanding and agreed with the plan.      Thank you very much for the opportunity to participate in the care of this very pleasant patient.         Return visit in 3 months    Meño Walsh M.D.         History of Present Illness / Interval History:     CHIEF COMPLAINT:  Sjogren associated ILD and bronchiolitis obliterans.      HISTORY OF PRESENT ILLNESS:  The patient is currently doing relatively well.  The patient is not taking albuterol almost at all.  The patient is using Flovent and the patient is on  the low dose of the prednisone.  The patient's pulmonary function tests are slightly improved and overall the patient is stable.  The patient recently had a hospitalization in April where she was found to have pneumonia and influenza and the patient was also in ICU because of respiratory failure.                 Pulmonary Data:         Exposure history: Asbestos;  No , TB;  No , Radiation;   No          Medications:     Current Outpatient Prescriptions   Medication Sig     predniSONE (DELTASONE) 1 MG tablet Take 1 tablet (1 mg) by mouth daily     traZODone (DESYREL) 50 MG tablet TAKE 1/2-1 TABLET BY MOUTH NIGHTLY AS NEEDED, CAN TITRATE DOWN TO 1/2TAB OR UP TO 2TABS AS NEEDED     PARoxetine (PAXIL) 20 MG tablet TAKE 1 TABLET (10mg) BY MOUTH AT BEDTIME     furosemide (LASIX) 40 MG tablet Take 0.5 tablets (20 mg) by mouth 2 times daily     ferrous sulfate (IRON) 325 (65 FE) MG tablet Take 1 tablet (325 mg) by mouth daily (with breakfast)     LANTUS SOLOSTAR 100 UNIT/ML soln INJECT 25 UNTIS SUBCUTANEOUSLY EVERY DAY     insulin aspart (NOVOLOG FLEXPEN) 100 UNIT/ML injection Inject 12 Units Subcutaneous 3 times daily (with meals)     lisinopril (PRINIVIL/ZESTRIL) 2.5 MG tablet Take 1 tablet (2.5 mg) by mouth daily     potassium chloride SA (K-DUR/KLOR-CON M) 20 MEQ CR tablet Take 1 tablet (20 mEq) by mouth daily     atorvastatin (LIPITOR) 40 MG tablet Take 1 tablet (40 mg) by mouth daily     lactobacillus rhamnosus, GG, (CULTURELL) capsule Take 1 capsule by mouth 3 times daily (before meals)     pantoprazole (PROTONIX) 40 MG EC tablet Take 1 tablet (40 mg) by mouth daily     blood glucose monitoring (NO BRAND SPECIFIED) test strip Use to test blood sugars 4 times daily or as directed     blood glucose monitoring (ACCU-CHEK MULTICLIX) lancets Use to test blood sugar 4 times daily or as directed.     insulin pen needle (B-D U/F) 31G X 8 MM Use 4 times daily (with Lantus and Novolog) or as directed.     insulin pen needle  (BD JANE U/F) 32G X 4 MM Use 4 daily as directed.     blood glucose monitoring (ACCU-CHEK SHANNON PLUS) test strip Use to test blood sugar 4 times daily or as directed.  Ok to substitute alternative if insurance prefers.     blood glucose monitoring (ACCU-CHEK FASTCLIX) lancets Use to test blood sugar 4 times daily or as directed.  Ok to substitute alternative if insurance prefers.     predniSONE (DELTASONE) 10 MG tablet Take 2 tablets for three days (4/12-14), then take 1 tablet daily (10 mg daily starting 4/15)     metFORMIN (GLUCOPHAGE-XR) 500 MG 24 hr tablet Take 2 tablets (1,000 mg) by mouth daily (with dinner)     order for DME Oxygen: Patient requires supplemental Oxygen 4 LPM via nasal canula with activity. Please provide patient with POC to improve mobility, okay to titrate for conserving to keep stats above 90%. Please fax results to [phone].  Oxygen will be for a lifetime.     pantoprazole (PROTONIX) 20 MG EC tablet Take 1-2 tablets (20-40 mg) by mouth daily     ketoconazole (NIZORAL) 2 % cream Apply topically 2 times daily     metoprolol (TOPROL-XL) 100 MG 24 hr tablet Take 1 tablet (100 mg) by mouth daily     spironolactone (ALDACTONE) 25 MG tablet Take 1 tablet (25 mg) by mouth daily     montelukast (SINGULAIR) 10 MG tablet Take 1 tablet (10 mg) by mouth At Bedtime     albuterol (PROAIR HFA, PROVENTIL HFA, VENTOLIN HFA) 108 (90 BASE) MCG/ACT inhaler Inhale 2 puffs into the lungs every 6 hours     order for DME Oxygen: Patient requires supplemental Oxygen 4 LPM via nasal canula with activity. Please provide patient with a home unit and with portability capability. Oxygen will be for a lifetime.     polyethylene glycol (MIRALAX/GLYCOLAX) packet Take 17 g by mouth daily as needed for constipation     warfarin (COUMADIN) 7.5 MG tablet Current dose is 7.5 mg daily.  Dose adjusted per INR result.     acyclovir (ZOVIRAX) 5 % ointment Apply topically 6 times daily (Patient taking differently: Apply  topically 6 times daily As needed for outbreaks)     fluticasone (FLOVENT HFA) 220 MCG/ACT inhaler Inhale 2 puffs into the lungs 2 times daily     acyclovir (ZOVIRAX) 400 MG tablet Take 400 mg by mouth See Admin Instructions 5 times daily as needed for outbreaks     fluticasone (FLONASE) 50 MCG/ACT nasal spray Spray 1-2 sprays into both nostrils daily (Patient taking differently: Spray 1-2 sprays into both nostrils daily as needed for allergies )     COMPRESSION STOCKINGS Wear compression stockings in affected leg (right leg) or both legs most of the time during the day and take them off at night.     acetaminophen (TYLENOL) 500 MG tablet Take 500 mg by mouth nightly as needed      No current facility-administered medications for this visit.              Past Medical History:     Past Medical History:   Diagnosis Date     Alcohol abuse, in remission      Allergic rhinitis, cause unspecified     allegra helps when she takes it     Antiplatelet or antithrombotic long-term use      Atrial fibrillation (H)     in hosp in 11/11 after surgery w/ fluid overload     Cardiomegaly     LVH on stress echo- cardiac w/u at [facility]- neg CT scan for PE, neg stress echo in 8/06     Chest pain, unspecified      Disorder of bone and cartilage, unspecified     osteopenia (had been on prempro), improved on 6/06 dexa, stable dexa 11/10     Diverticulosis of colon (without mention of hemorrhage)     last episode yrs ago     Essential hypertension, benign      Gastro-oesophageal reflux disease      Insomnia, unspecified     weaned off clonazepam     Irregular heart beat      Lumbago 7/09    MRI with DJD, now seeing Dr. Cain for sciatic sx's     Major depressive disorder, recurrent episode, moderate (H)      Obstructive sleep apnea      Osteoarthrosis, unspecified whether generalized or localized, unspecified site      Sjogren's syndrome (H)      Sleep apnea      Tobacco use disorder     chantix in 9/07, started again in 6/08, working              Past Surgical History:     Past Surgical History:   Procedure Laterality Date     BACK SURGERY  1962     BIOPSY BREAST  9/27/02    Biopsy Left Breast     C APPENDECTOMY  1970's?     C NONSPECIFIC PROCEDURE  11/05    exploratory abd lap, adhesions, resolved RLQ pain, diverticulitis episodes     CARDIAC SURGERY       CHOLECYSTECTOMY  1990's?     COLECTOMY LEFT  11/7/2011    Procedure:COLECTOMY LEFT; Laparoscopic mobilization of splenic flexture, sigmoid colectomy, coloprotoscopy, loop illeostomy; Surgeon:CK CASTANEDA; Location:[facility] OR     HYSTERECTOMY TOTAL ABDOMINAL, BILATERAL SALPINGO-OOPHORECTOMY, COMBINED  11/7/2011    Procedure:COMBINED HYSTERECTOMY TOTAL ABDOMINAL, BILATERAL SALPINGO-OOPHORECTOMY; total abdominal hysterectomy, bilateral salpingo-oophorectomy; Surgeon:ALETA MANUEL; Location:[facility] OR     INSERT STENT URETER  11/7/2011    Procedure:INSERT STENT URETER; Placement of Bilateral Ureteral Stents ; Surgeon:PRANEETH BRYANT; Location:[facility] OR     SIGMOIDOSCOPY FLEXIBLE  11/3/2011    Procedure:SIGMOIDOSCOPY FLEXIBLE; Flexible Sigmoidoscopy; Surgeon:CK CASTANEDA; Location:[facility]     TAKEDOWN ILEOSTOMY  2/1/2012    Procedure:TAKEDOWN ILEOSTOMY; Takedown Loop Ileostomy ; Surgeon:CK CASTANEDA; Location:[facility] OR             Social History:     Social History     Social History     Marital status: Single     Spouse name: N/A     Number of children: 0     Years of education: Ed Spec De     Occupational History     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     Social History Main Topics     Smoking status: Former Smoker     Packs/day: 0.50     Years: 10.00     Types: Cigarettes     Quit date: 8/1/2011     Smokeless tobacco: Never Used      Comment: 1/2 ppd     Alcohol use No      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     Drug use: No     Sexual activity: No     Other Topics Concern     Not on file     Social History Narrative    Social Documentation:         Balanced Diet: YES    Calcium intake: 1-2 per day    Caffeine: 4-5 cups per day    Exercise:  type of activity limited right now due to foot injury    Sunscreen: No    Seatbelts:  Yes    Self Breast Exam:  Yes    Self Testicular Exam: n/a    Physical/Emotional/Sexual Abuse: No    Do you feel safe in your environment? No-pt lives in [facility]        Cholesterol screen up to date: No-will check today    Eye Exam up to date: Yes    Dental Exam up to date: Yes    Pap smear up to date: Does Not Apply    Mammogram up to date: Yes-10/07    Dexa Scan up to date: Yes-2006    Colonoscopy up to date: Yes-2006    Immunizations up to date: Yes-td 2002    Glucose screen if over 40:  Yes    '09                             Family History:     Family History   Problem Relation Age of Onset     C.A.D. Mother 63     MI- first at age 63     HEART DISEASE Mother      Hypertension Mother      CEREBROVASCULAR DISEASE Mother      Hyperlipidemia Mother      Alcohol/Drug Father      Alzheimer Disease Father      Dementia Father      Hypertension Father      Hyperlipidemia Father      C.A.D. Sister 52     Minor MI- age 50's     HEART DISEASE Sister      Hypertension Sister      Hypertension Sister      Hypertension Brother      Cancer - colorectal Sister 48     Late 40's early 50's     Prostate Cancer Brother 74     Dx'd age 74     GASTROINTESTINAL DISEASE Sister      Diverticulitis     GASTROINTESTINAL DISEASE Brother      Diverticulitis     Lipids Sister      Lipids Sister      Parkinsonism Brother      DIABETES Sister      HEART DISEASE Sister      CHF     CANCER Sister      lung, smoker     Substance Abuse Sister      Substance Abuse Brother      Asthma Sister      CANCER Sister      Breast Cancer Daughter      Prostate Cancer Brother      Hyperlipidemia Brother              Immunization:     Immunization History   Administered Date(s) Administered     Influenza (High Dose) 3 valent vaccine 10/11/2013, 10/14/2014, 12/31/2015, 11/11/2016      Influenza (IIV3) 11/24/2003, 10/08/2004, 10/28/2005, 11/01/2006, 09/01/2010, 10/17/2011, 09/21/2012     Pneumococcal (PCV 13) 10/30/2015     Pneumococcal 23 valent 11/03/2010     TD (ADULT, 7+) 01/15/1993, 11/05/2002     TDAP Vaccine (Adacel) 11/03/2010     Zoster vaccine, live 10/05/2009              Review of Systems:   I have done 10 points of review systems and pertinent findings are as above, otherwise negative.           Physical Examination:   General: Alert, oriented, not in distress  B/P: 130/77, T: Data Unavailable, P: 92, R: 16  HEENT: neck supple, symmetrical, no lymph node enlargement, thyromegaly, bruit, JVD, pupils are isocoric and equally responsive to the light.   Lungs: both hemithoraces are symmetrical, normal to palpation, no dullness to percussion, auscultation of lungs revealed bibasilar crackles  CVS: Normal S1 and S2, no additional heart sounds, murmur, rub, normal peripheral pulses  Abdomen: Bowel sounds present, soft, non tender, non distended, no organomegaly, ascitis, mass  Extremities/musculoskeletal: no peripheral edema, deformity, cyanosis, clubbing  Neurology: alert, orientedx3, no motor/sensorial deficits, cranial nerves grossly normal  Skin: no rash  Psychiatry: Mood and affect are appropriate             DATA:     CBC RESULTS:     Recent Labs   Lab Test  05/31/17   1606  04/28/17   1504   WBC  13.8*  9.4   RBC  4.45  4.42   HGB  9.4*  9.3*   HCT  33.0*  33.1*   PLT  322  296       Basic Metabolic Panel:  Recent Labs   Lab Test  04/28/17   1504  04/11/17   0651   NA  140  143   POTASSIUM  4.2  3.6   CHLORIDE  105  106   CO2  25  27   BUN  15  14   CR  0.82  0.69   GLC  121*  91   CLAUDY  8.8  8.4*       INR  Recent Labs   Lab Test 05/31/17 05/16/17   INR  1.9*  1.9*        PFT   PFT Latest Ref Rng & Units 6/21/2017   FVC L 1.76   FEV1 L 1.35   FVC% % 63   FEV1% % 63             Thank you for allowing me participate in the care of Betty Tee.      Meño Walsh M.D.  Associate  Professor of Medicine  Pulmonary, Allergy, Critical Care and Sleep

## 2017-06-21 NOTE — LETTER
6/21/2017       RE: Betty Tee  [address]  [facility] [address]     Dear Colleague,    Thank you for referring your patient, Betty Tee, to the [facility] at [facility]. Please see a copy of my visit note below.          [facility]   Pulmonary Clinic Follow Up Note  June 21, 2017      Betty Tee MRN# [id number]   Age: 77 year old YOB: 1940     Date of Consultation: June 21, 2017    Primary care provider: Ilene Tristan     History taken from; Patient      Betty Tee is a 77 year old female seen in the Pulmonary Clinic  for f/u.  Chief Complaint   Patient presents with     Interstitial Lung Disease (ILD)     Patient is being seen for ILD follow up   .          Pulmonary Problem List / Reason for follow up:   1. ILD  2. 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  The patient is stable.   1.  Interstitial lung disease.  We will try to decrease the prednisone for 1 mg every 2 weeks.  The patient will need to see Rheumatology for further management of Sjogren Disease.   2.  Bronchiolitis obliterans.  We will continue with Flovent and albuterol on an as needed basis.  The patient will also be referred to pulmonary rehabilitation.   3.  Lung nodules.  The patient is found on the most recent CT to have a lung nodule of 2 mm and further followup is not needed.        We explained the plan to the patient including the risks and benefits.  The patient expressed understanding and agreed with the plan.      Thank you very much for the opportunity to participate in the care of this very pleasant patient.         Return visit in 3 months    Meño Walsh M.D.          History of Present Illness / Interval History:     CHIEF COMPLAINT:  Sjogren associated ILD and bronchiolitis obliterans.      HISTORY OF PRESENT ILLNESS:  The patient is currently doing relatively well.  The patient is not taking albuterol almost at all.  The patient is using Flovent and the patient is on the low dose of the prednisone.  The patient's pulmonary function tests are slightly improved and overall the patient is stable.  The patient recently had a hospitalization in April where she was found to have pneumonia and influenza and the patient was also in ICU because of respiratory failure.                 Pulmonary Data:         Exposure history: Asbestos;  No , TB;  No , Radiation;   No          Medications:     Current Outpatient Prescriptions   Medication Sig     predniSONE (DELTASONE) 1 MG tablet Take 1 tablet (1 mg) by mouth daily     traZODone (DESYREL) 50 MG tablet TAKE 1/2-1 TABLET BY MOUTH NIGHTLY AS NEEDED, CAN TITRATE DOWN TO 1/2TAB OR UP TO 2TABS AS NEEDED     PARoxetine (PAXIL) 20 MG tablet TAKE 1 TABLET (10mg) BY MOUTH AT BEDTIME     furosemide (LASIX) 40 MG tablet Take 0.5 tablets (20 mg) by mouth 2 times daily     ferrous sulfate (IRON) 325 (65 FE) MG tablet Take 1 tablet (325 mg) by mouth daily (with breakfast)     LANTUS SOLOSTAR 100 UNIT/ML soln INJECT 25 UNTIS SUBCUTANEOUSLY EVERY DAY     insulin aspart (NOVOLOG FLEXPEN) 100 UNIT/ML injection Inject 12 Units Subcutaneous 3 times daily (with meals)     lisinopril (PRINIVIL/ZESTRIL) 2.5 MG tablet Take 1 tablet (2.5 mg) by mouth daily     potassium chloride SA (K-DUR/KLOR-CON M) 20 MEQ CR tablet Take 1 tablet (20 mEq) by mouth daily     atorvastatin (LIPITOR) 40 MG tablet Take 1 tablet (40 mg) by mouth daily     lactobacillus rhamnosus, GG, (CULTURELL) capsule Take 1 capsule by mouth 3 times daily (before meals)     pantoprazole (PROTONIX) 40 MG EC tablet Take 1 tablet (40 mg) by mouth daily     blood glucose monitoring (NO BRAND  SPECIFIED) test strip Use to test blood sugars 4 times daily or as directed     blood glucose monitoring (ACCU-CHEK MULTICLIX) lancets Use to test blood sugar 4 times daily or as directed.     insulin pen needle (B-D U/F) 31G X 8 MM Use 4 times daily (with Lantus and Novolog) or as directed.     insulin pen needle (BD JANE U/F) 32G X 4 MM Use 4 daily as directed.     blood glucose monitoring (ACCU-CHEK SHANNON PLUS) test strip Use to test blood sugar 4 times daily or as directed.  Ok to substitute alternative if insurance prefers.     blood glucose monitoring (ACCU-CHEK FASTCLIX) lancets Use to test blood sugar 4 times daily or as directed.  Ok to substitute alternative if insurance prefers.     predniSONE (DELTASONE) 10 MG tablet Take 2 tablets for three days (4/12-14), then take 1 tablet daily (10 mg daily starting 4/15)     metFORMIN (GLUCOPHAGE-XR) 500 MG 24 hr tablet Take 2 tablets (1,000 mg) by mouth daily (with dinner)     order for DME Oxygen: Patient requires supplemental Oxygen 4 LPM via nasal canula with activity. Please provide patient with POC to improve mobility, okay to titrate for conserving to keep stats above 90%. Please fax results to [phone].  Oxygen will be for a lifetime.     pantoprazole (PROTONIX) 20 MG EC tablet Take 1-2 tablets (20-40 mg) by mouth daily     ketoconazole (NIZORAL) 2 % cream Apply topically 2 times daily     metoprolol (TOPROL-XL) 100 MG 24 hr tablet Take 1 tablet (100 mg) by mouth daily     spironolactone (ALDACTONE) 25 MG tablet Take 1 tablet (25 mg) by mouth daily     montelukast (SINGULAIR) 10 MG tablet Take 1 tablet (10 mg) by mouth At Bedtime     albuterol (PROAIR HFA, PROVENTIL HFA, VENTOLIN HFA) 108 (90 BASE) MCG/ACT inhaler Inhale 2 puffs into the lungs every 6 hours     order for DME Oxygen: Patient requires supplemental Oxygen 4 LPM via nasal canula with activity. Please provide patient with a home unit and with portability capability. Oxygen will be for a  lifetime.     polyethylene glycol (MIRALAX/GLYCOLAX) packet Take 17 g by mouth daily as needed for constipation     warfarin (COUMADIN) 7.5 MG tablet Current dose is 7.5 mg daily.  Dose adjusted per INR result.     acyclovir (ZOVIRAX) 5 % ointment Apply topically 6 times daily (Patient taking differently: Apply topically 6 times daily As needed for outbreaks)     fluticasone (FLOVENT HFA) 220 MCG/ACT inhaler Inhale 2 puffs into the lungs 2 times daily     acyclovir (ZOVIRAX) 400 MG tablet Take 400 mg by mouth See Admin Instructions 5 times daily as needed for outbreaks     fluticasone (FLONASE) 50 MCG/ACT nasal spray Spray 1-2 sprays into both nostrils daily (Patient taking differently: Spray 1-2 sprays into both nostrils daily as needed for allergies )     COMPRESSION STOCKINGS Wear compression stockings in affected leg (right leg) or both legs most of the time during the day and take them off at night.     acetaminophen (TYLENOL) 500 MG tablet Take 500 mg by mouth nightly as needed      No current facility-administered medications for this visit.              Past Medical History:     Past Medical History:   Diagnosis Date     Alcohol abuse, in remission      Allergic rhinitis, cause unspecified     allegra helps when she takes it     Antiplatelet or antithrombotic long-term use      Atrial fibrillation (H)     in hosp in 11/11 after surgery w/ fluid overload     Cardiomegaly     LVH on stress echo- cardiac w/u at [facility] ER- neg CT scan for PE, neg stress echo in 8/06     Chest pain, unspecified      Disorder of bone and cartilage, unspecified     osteopenia (had been on prempro), improved on 6/06 dexa, stable dexa 11/10     Diverticulosis of colon (without mention of hemorrhage)     last episode yrs ago     Essential hypertension, benign      Gastro-oesophageal reflux disease      Insomnia, unspecified     weaned off clonazepam     Irregular heart beat      Lumbago 7/09    MRI with NEAL, now seeing Dr. Cain for  sciatic sx's     Major depressive disorder, recurrent episode, moderate (H)      Obstructive sleep apnea      Osteoarthrosis, unspecified whether generalized or localized, unspecified site      Sjogren's syndrome (H)      Sleep apnea      Tobacco use disorder     chantix in 9/07, started again in 6/08, working             Past Surgical History:     Past Surgical History:   Procedure Laterality Date     BACK SURGERY  1962     BIOPSY BREAST  9/27/02    Biopsy Left Breast     C APPENDECTOMY  1970's?     C NONSPECIFIC PROCEDURE  11/05    exploratory abd lap, adhesions, resolved RLQ pain, diverticulitis episodes     CARDIAC SURGERY       CHOLECYSTECTOMY  1990's?     COLECTOMY LEFT  11/7/2011    Procedure:COLECTOMY LEFT; Laparoscopic mobilization of splenic flexture, sigmoid colectomy, coloprotoscopy, loop illeostomy; Surgeon:CK CASTANEDA; Location:[facility] OR     HYSTERECTOMY TOTAL ABDOMINAL, BILATERAL SALPINGO-OOPHORECTOMY, COMBINED  11/7/2011    Procedure:COMBINED HYSTERECTOMY TOTAL ABDOMINAL, BILATERAL SALPINGO-OOPHORECTOMY; total abdominal hysterectomy, bilateral salpingo-oophorectomy; Surgeon:ALETA MANUEL; Location:[facility] OR     INSERT STENT URETER  11/7/2011    Procedure:INSERT STENT URETER; Placement of Bilateral Ureteral Stents ; Surgeon:PRANEETH BRYANT; Location:[facility] OR     SIGMOIDOSCOPY FLEXIBLE  11/3/2011    Procedure:SIGMOIDOSCOPY FLEXIBLE; Flexible Sigmoidoscopy; Surgeon:CK CASTANEDA; Location:[facility]     TAKEDOWN ILEOSTOMY  2/1/2012    Procedure:TAKEDOWN ILEOSTOMY; Takedown Loop Ileostomy ; Surgeon:CK CASTANEDA; Location:[facility] OR             Social History:     Social History     Social History     Marital status: Single     Spouse name: N/A     Number of children: 0     Years of education: Ed Spec De     Occupational History     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     Social History Main Topics     Smoking status: Former Smoker     Packs/day: 0.50     Years:  10.00     Types: Cigarettes     Quit date: 8/1/2011     Smokeless tobacco: Never Used      Comment: 1/2 ppd     Alcohol use No      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     Drug use: No     Sexual activity: No     Other Topics Concern     Not on file     Social History Narrative    Social Documentation:        Balanced Diet: YES    Calcium intake: 1-2 per day    Caffeine: 4-5 cups per day    Exercise:  type of activity limited right now due to foot injury    Sunscreen: No    Seatbelts:  Yes    Self Breast Exam:  Yes    Self Testicular Exam: n/a    Physical/Emotional/Sexual Abuse: No    Do you feel safe in your environment? No-pt lives in [facility]        Cholesterol screen up to date: No-will check today    Eye Exam up to date: Yes    Dental Exam up to date: Yes    Pap smear up to date: Does Not Apply    Mammogram up to date: Yes-10/07    Dexa Scan up to date: Yes-2006    Colonoscopy up to date: Yes-2006    Immunizations up to date: Yes-td 2002    Glucose screen if over 40:  Yes    '09                             Family History:     Family History   Problem Relation Age of Onset     C.A.D. Mother 63     MI- first at age 63     HEART DISEASE Mother      Hypertension Mother      CEREBROVASCULAR DISEASE Mother      Hyperlipidemia Mother      Alcohol/Drug Father      Alzheimer Disease Father      Dementia Father      Hypertension Father      Hyperlipidemia Father      C.A.D. Sister 52     Minor MI- age 50's     HEART DISEASE Sister      Hypertension Sister      Hypertension Sister      Hypertension Brother      Cancer - colorectal Sister 48     Late 40's early 50's     Prostate Cancer Brother 74     Dx'd age 74     GASTROINTESTINAL DISEASE Sister      Diverticulitis     GASTROINTESTINAL DISEASE Brother      Diverticulitis     Lipids Sister      Lipids Sister      Parkinsonism Brother      DIABETES Sister      HEART DISEASE Sister      CHF     CANCER Sister      lung, smoker     Substance Abuse Sister      Substance  Abuse Brother      Asthma Sister      CANCER Sister      Breast Cancer Daughter      Prostate Cancer Brother      Hyperlipidemia Brother              Immunization:     Immunization History   Administered Date(s) Administered     Influenza (High Dose) 3 valent vaccine 10/11/2013, 10/14/2014, 12/31/2015, 11/11/2016     Influenza (IIV3) 11/24/2003, 10/08/2004, 10/28/2005, 11/01/2006, 09/01/2010, 10/17/2011, 09/21/2012     Pneumococcal (PCV 13) 10/30/2015     Pneumococcal 23 valent 11/03/2010     TD (ADULT, 7+) 01/15/1993, 11/05/2002     TDAP Vaccine (Adacel) 11/03/2010     Zoster vaccine, live 10/05/2009              Review of Systems:   I have done 10 points of review systems and pertinent findings are as above, otherwise negative.           Physical Examination:   General: Alert, oriented, not in distress  B/P: 130/77, T: Data Unavailable, P: 92, R: 16  HEENT: neck supple, symmetrical, no lymph node enlargement, thyromegaly, bruit, JVD, pupils are isocoric and equally responsive to the light.   Lungs: both hemithoraces are symmetrical, normal to palpation, no dullness to percussion, auscultation of lungs revealed bibasilar crackles  CVS: Normal S1 and S2, no additional heart sounds, murmur, rub, normal peripheral pulses  Abdomen: Bowel sounds present, soft, non tender, non distended, no organomegaly, ascitis, mass  Extremities/musculoskeletal: no peripheral edema, deformity, cyanosis, clubbing  Neurology: alert, orientedx3, no motor/sensorial deficits, cranial nerves grossly normal  Skin: no rash  Psychiatry: Mood and affect are appropriate             DATA:     CBC RESULTS:     Recent Labs   Lab Test  05/31/17   1606  04/28/17   1504   WBC  13.8*  9.4   RBC  4.45  4.42   HGB  9.4*  9.3*   HCT  33.0*  33.1*   PLT  322  296       Basic Metabolic Panel:  Recent Labs   Lab Test  04/28/17   1504  04/11/17   0651   NA  140  143   POTASSIUM  4.2  3.6   CHLORIDE  105  106   CO2  25  27   BUN  15  14   CR  0.82  0.69   GLC   121*  91   CLAUDY  8.8  8.4*       INR  Recent Labs   Lab Test 05/31/17 05/16/17   INR  1.9*  1.9*        PFT   PFT Latest Ref Rng & Units 6/21/2017   FVC L 1.76   FEV1 L 1.35   FVC% % 63   FEV1% % 63     Thank you for allowing me participate in the care of Betty Tee.      Meño Walsh M.D.  Associate Professor of Medicine  Pulmonary, Allergy, Critical Care and Sleep

## 2017-06-21 NOTE — MR AVS SNAPSHOT
After Visit Summary   6/21/2017    Betty Tee    MRN: [id number]           Patient Information     Date Of Birth          1940        Visit Information        Provider Department      6/21/2017 2:30 PM Meño Walsh MD [facility]        Today's Diagnoses     ILD (interstitial lung disease) (H)    -  1      Care Instructions    Patient is instructed to call if there is any changes in symptoms.          Follow-ups after your visit        Additional Services     PULMONARY REHAB REFERRAL           RHEUMATOLOGY REFERRAL       Your provider has referred you to: [facility]: Rheumatology Clinic [facility] [phone]   [email or web address]    Please be aware that coverage of these services is subject to the terms and limitations of your health insurance plan.  Call member services at your health plan with any benefit or coverage questions.      Please bring the following with you to your appointment:    (1) Any X-Rays, CTs or MRIs which have been performed.  Contact the facility where they were done to arrange for  prior to your scheduled appointment.    (2) List of current medications   (3) This referral request   (4) Any documents/labs given to you for this referral                  Follow-up notes from your care team     Return in about 3 months (around 9/21/2017).      Your next 10 appointments already scheduled     Jun 22, 2017  4:30 PM CDT   Lab with  LAB   [facility])    [address] [place] [address] [id number] [phone]            Jun 22, 2017  5:00 PM CDT   (Arrive by 4:45 PM)   RETURN HEART FAILURE with Radha Rosa MD   [facility] ([facility])    [address] [place] [address] [id number] [phone]            Jul 05, 2017 11:00 AM CDT   Office Visit with Ilene Tristan MD    [facility] ([facility])    [address]  [facility] [address] [phone]           Bring a current list of meds and any records pertaining to this visit.  For Physicals, please bring immunization records and any forms needing to be filled out.  Please arrive 10 minutes early to complete paperwork.            Oct 04, 2017 11:00 AM CDT   (Arrive by 10:45 AM)   Return Interstitial Lung with Meño Walsh MD   [facility] ([facility])    [address]  3rd Floor  [facility] [address] [phone]              Future tests that were ordered for you today     Open Future Orders        [facility] Expected Expires Ordered    General PFT Lab (Please always keep checked) Routine  6/21/2018 6/21/2017    Pulmonary Function Test Routine  6/21/2018 6/21/2017    PULMONARY REHAB REFERRAL Routine  6/21/2018 6/21/2017    6 minute walk test Routine  6/21/2018 6/21/2017            Who to contact     If you have questions or need follow up information about today's clinic visit or your schedule please contact [facility] directly at [phone].  Normal or non-critical lab and imaging results will be communicated to you by C2FOhart, letter or phone within 4 business days after the clinic has received the results. If you do not hear from us within 7 days, please contact the clinic through C2FOhart or phone. If you have a critical or abnormal lab result, we will notify you by phone as soon as possible.  Submit refill requests through dondeEstaâ„¢ or call your pharmacy and they will forward the refill request to us. Please allow 3 business days for your refill to be completed.          Additional Information About Your Visit        dondeEstaâ„¢ Information     dondeEstaâ„¢ gives you secure access to your electronic health record. If you see a primary care provider, you can also send messages to your care team  and make appointments. If you have questions, please call your primary care clinic.  If you do not have a primary care provider, please call [phone] and they will assist you.        Care EveryWhere ID     This is your Care EveryWhere ID. This could be used by other organizations to access your [place] medical records  [id number]        Your Vitals Were     Pulse Respirations Pulse Oximetry BMI (Body Mass Index)          92 16 96% 31.58 kg/m2         Blood Pressure from Last 3 Encounters:   06/21/17 130/77   05/31/17 113/68   05/16/17 124/60    Weight from Last 3 Encounters:   06/21/17 90.7 kg (200 lb)   05/31/17 91.6 kg (202 lb)   05/16/17 91.2 kg (201 lb)              We Performed the Following     RHEUMATOLOGY REFERRAL          Today's Medication Changes          These changes are accurate as of: 6/21/17  4:08 PM.  If you have any questions, ask your nurse or doctor.               These medicines have changed or have updated prescriptions.        Dose/Directions    acyclovir 5 % ointment   Commonly known as:  ZOVIRAX   This may have changed:  additional instructions   Used for:  Recurrent cold sores        Apply topically 6 times daily   Quantity:  15 g   Refills:  3       fluticasone 50 MCG/ACT spray   Commonly known as:  FLONASE   This may have changed:    - when to take this  - reasons to take this   Used for:  Seasonal allergic rhinitis        Dose:  1-2 spray   Spray 1-2 sprays into both nostrils daily   Quantity:  16 g   Refills:  5       * predniSONE 10 MG tablet   Commonly known as:  DELTASONE   This may have changed:  Another medication with the same name was added. Make sure you understand how and when to take each.   Used for:  ILD (interstitial lung disease) (H), Sjogren's syndrome (H)        Take 2 tablets for three days (4/12-14), then take 1 tablet daily (10 mg daily starting 4/15)   Quantity:  90 tablet   Refills:  3       * predniSONE 1 MG tablet   Commonly known as:  DELTASONE   This may  have changed:  You were already taking a medication with the same name, and this prescription was added. Make sure you understand how and when to take each.   Used for:  ILD (interstitial lung disease) (H)   Changed by:  Meño Walsh MD        Dose:  1 mg   Take 1 tablet (1 mg) by mouth daily   Quantity:  30 tablet   Refills:  3       * Notice:  This list has 2 medication(s) that are the same as other medications prescribed for you. Read the directions carefully, and ask your doctor or other care provider to review them with you.         Where to get your medicines      These medications [id number] [place] [facility] [address]  [address] [place] [address] [facility] [address]     Phone:  [phone]     predniSONE 1 MG tablet                Primary Care Provider Office Phone # Fax #    Ilene Tristan [phone]       [facility] [address] [address]        Equal Access to Services     [facility] AH: Hadii aad ku hadasho Soomaali, waaxda luqadaha, qaybta kaalmada adeegyada, waxay idiin hayaan adeeg susy mercedes . So [facility] [phone].    ATENCIÓN: Si habla español, tiene a conti disposición servicios gratuitos de asistencia lingüística. [facility] [phone].    We comply with applicable federal civil rights laws and [place] laws. We do not discriminate on the basis of race, color, national origin, age, disability sex, sexual orientation or gender identity.            Thank you!     Thank you for choosing [facility]  for your care. Our goal is always to provide you with excellent care. Hearing back from our patients is one way we can continue to improve our services. Please take a few minutes to complete the written survey that you may receive in the mail after your visit with us. Thank you!             Your Updated Medication List - Protect others around you: Learn how to safely  use, store and throw away your medicines at www.disposemymeds.org.          This list is accurate as of: 6/21/17  4:08 PM.  Always use your most recent med list.                   Brand Name Dispense Instructions for use Diagnosis    acyclovir 400 MG tablet    ZOVIRAX     Take 400 mg by mouth See Admin Instructions 5 times daily as needed for outbreaks        acyclovir 5 % ointment    ZOVIRAX    15 g    Apply topically 6 times daily    Recurrent cold sores       albuterol 108 (90 BASE) MCG/ACT Inhaler    PROAIR HFA/PROVENTIL HFA/VENTOLIN HFA    1 Inhaler    Inhale 2 puffs into the lungs every 6 hours    ILD (interstitial lung disease) (H)       atorvastatin 40 MG tablet    LIPITOR    90 tablet    Take 1 tablet (40 mg) by mouth daily    Type 2 diabetes mellitus with other specified complication (H)       * blood glucose monitoring lancets     4 Box    Use to test blood sugar 4 times daily or as directed.    Type 2 diabetes mellitus without complication, without long-term current use of insulin (H)       * blood glucose monitoring lancets     1 Box    Use to test blood sugar 4 times daily or as directed.  Ok to substitute alternative if insurance prefers.    Type 2 diabetes mellitus without complication, without long-term current use of insulin (H)       * blood glucose monitoring test strip    no brand specified    300 strip    Use to test blood sugars 4 times daily or as directed    Type 2 diabetes mellitus without complication, without long-term current use of insulin (H)       * blood glucose monitoring test strip    ACCU-CHEK SHANNON PLUS    120 strip    Use to test blood sugar 4 times daily or as directed.  Ok to substitute alternative if insurance prefers.    Type 2 diabetes mellitus without complication, without long-term current use of insulin (H)       COMPRESSION STOCKINGS     2 each    Wear compression stockings in affected leg (right leg) or both legs most of the time during the day and take them off at  night.    DVT (deep venous thrombosis), right, Postphlebitic syndrome, Chronic anticoagulation, Atrial fibrillation (H)       ferrous sulfate 325 (65 FE) MG tablet    IRON    30 tablet    Take 1 tablet (325 mg) by mouth daily (with breakfast)    Iron deficiency anemia due to chronic blood loss       fluticasone 220 MCG/ACT Inhaler    FLOVENT HFA    3 Inhaler    Inhale 2 puffs into the lungs 2 times daily    ILD (interstitial lung disease) (H), Follicular bronchiolitis (H)       fluticasone 50 MCG/ACT spray    FLONASE    16 g    Spray 1-2 sprays into both nostrils daily    Seasonal allergic rhinitis       furosemide 40 MG tablet    LASIX    180 tablet    Take 0.5 tablets (20 mg) by mouth 2 times daily    Acute edema of lung (H)       insulin aspart 100 UNIT/ML injection    NovoLOG FLEXPEN    45 mL    Inject 12 Units Subcutaneous 3 times daily (with meals)    Type 2 diabetes mellitus with other specified complication (H)       * insulin pen needle 31G X 8 MM    B-D U/F    400 each    Use 4 times daily (with Lantus and Novolog) or as directed.    Type 2 diabetes mellitus without complication, without long-term current use of insulin (H)       * insulin pen needle 32G X 4 MM    BD JANE U/F    200 each    Use 4 daily as directed.    Type 2 diabetes mellitus without complication, without long-term current use of insulin (H)       ketoconazole 2 % cream    NIZORAL    60 g    Apply topically 2 times daily    Cutaneous candidiasis       lactobacillus rhamnosus (GG) capsule     90 capsule    Take 1 capsule by mouth 3 times daily (before meals)    Pneumonia due to infectious organism, unspecified laterality, unspecified part of lung       LANTUS SOLOSTAR 100 UNIT/ML injection   Generic drug:  insulin glargine     15 mL    INJECT 25 UNTIS SUBCUTANEOUSLY EVERY DAY    Type 2 diabetes mellitus with hyperglycemia, with long-term current use of insulin (H)       lisinopril 2.5 MG tablet    PRINIVIL/Zestril    90 tablet    Take 1  tablet (2.5 mg) by mouth daily    Essential hypertension with goal blood pressure less than 140/90       metFORMIN 500 MG 24 hr tablet    GLUCOPHAGE-XR    180 tablet    Take 2 tablets (1,000 mg) by mouth daily (with dinner)    Type 2 diabetes mellitus without complication, without long-term current use of insulin (H)       metoprolol 100 MG 24 hr tablet    TOPROL-XL    90 tablet    Take 1 tablet (100 mg) by mouth daily    Atrial fibrillation with controlled ventricular response (H)       montelukast 10 MG tablet    SINGULAIR    90 tablet    Take 1 tablet (10 mg) by mouth At Bedtime    Sjogren's syndrome (H), ILD (interstitial lung disease) (H)       * order for DME     1 Device    Oxygen: Patient requires supplemental Oxygen 4 LPM via nasal canula with activity. Please provide patient with a home unit and with portability capability. Oxygen will be for a lifetime.    Hypoxia, ILD (interstitial lung disease) (H)       * order for DME     1 Device    Oxygen: Patient requires supplemental Oxygen 4 LPM via nasal canula with activity. Please provide patient with POC to improve mobility, okay to titrate for conserving to keep stats above 90%. Please fax results to [phone].  Oxygen will be for a lifetime.    Hypoxia, ILD (interstitial lung disease) (H)       * pantoprazole 20 MG EC tablet    PROTONIX    60 tablet    Take 1-2 tablets (20-40 mg) by mouth daily    Gastroesophageal reflux disease without esophagitis       * pantoprazole 40 MG EC tablet    PROTONIX    90 tablet    Take 1 tablet (40 mg) by mouth daily    Gastroesophageal reflux disease without esophagitis       PARoxetine 20 MG tablet    PAXIL    20 tablet    TAKE 1 TABLET (10mg) BY MOUTH AT BEDTIME    Major depressive disorder, recurrent episode, moderate (H)       polyethylene glycol Packet    MIRALAX/GLYCOLAX    7 packet    Take 17 g by mouth daily as needed for constipation    Constipation, unspecified constipation type       potassium chloride SA 20  MEQ CR tablet    K-DUR/KLOR-CON M    90 tablet    Take 1 tablet (20 mEq) by mouth daily    Acute on chronic heart failure, unspecified heart failure type (H), Acute and chronic respiratory failure with hypoxia (H), (HFpEF) heart failure with preserved ejection fraction (H)       * predniSONE 10 MG tablet    DELTASONE    90 tablet    Take 2 tablets for three days (4/12-14), then take 1 tablet daily (10 mg daily starting 4/15)    ILD (interstitial lung disease) (H), Sjogren's syndrome (H)       * predniSONE 1 MG tablet    DELTASONE    30 tablet    Take 1 tablet (1 mg) by mouth daily    ILD (interstitial lung disease) (H)       spironolactone 25 MG tablet    ALDACTONE    90 tablet    Take 1 tablet (25 mg) by mouth daily    Chronic diastolic congestive heart failure (H)       traZODone 50 MG tablet    DESYREL    90 tablet    TAKE 1/2-1 TABLET BY MOUTH NIGHTLY AS NEEDED, CAN TITRATE DOWN TO 1/2TAB OR UP TO 2TABS AS NEEDED    Insomnia due to medical condition       TYLENOL 500 MG tablet   Generic drug:  acetaminophen      Take 500 mg by mouth nightly as needed    Dizziness       warfarin 7.5 MG tablet    COUMADIN    100 tablet    Current dose is 7.5 mg daily.  Dose adjusted per INR result.    Atrial fibrillation with controlled ventricular response (H)       * Notice:  This list has 12 medication(s) that are the same as other medications prescribed for you. Read the directions carefully, and ask your doctor or other care provider to review them with you.

## 2017-06-21 NOTE — NURSING NOTE
Chief Complaint   Patient presents with     Interstitial Lung Disease (ILD)     Patient is being seen for ILD follow up      Jasmina Mason CMA at 2:35 PM on 6/21/2017

## 2017-06-22 ENCOUNTER — OFFICE VISIT (OUTPATIENT)
Dept: RHEUMATOLOGY | Facility: CLINIC | Age: 77
End: 2017-06-22
Attending: INTERNAL MEDICINE
Payer: MEDICARE

## 2017-06-22 ENCOUNTER — OFFICE VISIT (OUTPATIENT)
Dept: CARDIOLOGY | Facility: CLINIC | Age: 77
End: 2017-06-22
Attending: INTERNAL MEDICINE
Payer: MEDICARE

## 2017-06-22 VITALS
SYSTOLIC BLOOD PRESSURE: 113 MMHG | DIASTOLIC BLOOD PRESSURE: 74 MMHG | OXYGEN SATURATION: 97 % | WEIGHT: 201 LBS | BODY MASS INDEX: 32.3 KG/M2 | HEIGHT: 66 IN | HEART RATE: 58 BPM

## 2017-06-22 VITALS
HEART RATE: 58 BPM | WEIGHT: 201 LBS | SYSTOLIC BLOOD PRESSURE: 113 MMHG | DIASTOLIC BLOOD PRESSURE: 74 MMHG | BODY MASS INDEX: 31.55 KG/M2 | HEIGHT: 67 IN | TEMPERATURE: 98 F

## 2017-06-22 DIAGNOSIS — I50.30 (HFPEF) HEART FAILURE WITH PRESERVED EJECTION FRACTION (H): ICD-10-CM

## 2017-06-22 DIAGNOSIS — I50.32 CHRONIC DIASTOLIC CONGESTIVE HEART FAILURE (H): ICD-10-CM

## 2017-06-22 DIAGNOSIS — Z79.52 LONG TERM (CURRENT) USE OF SYSTEMIC STEROIDS: ICD-10-CM

## 2017-06-22 DIAGNOSIS — M35.00 SJOGREN'S SYNDROME (H): ICD-10-CM

## 2017-06-22 DIAGNOSIS — M89.9 DISORDER OF BONE: ICD-10-CM

## 2017-06-22 DIAGNOSIS — I50.33 ACUTE ON CHRONIC DIASTOLIC HEART FAILURE (H): ICD-10-CM

## 2017-06-22 DIAGNOSIS — Z79.4 TYPE 2 DIABETES MELLITUS WITH HYPERGLYCEMIA, WITH LONG-TERM CURRENT USE OF INSULIN (H): ICD-10-CM

## 2017-06-22 DIAGNOSIS — E11.65 TYPE 2 DIABETES MELLITUS WITH HYPERGLYCEMIA, WITH LONG-TERM CURRENT USE OF INSULIN (H): ICD-10-CM

## 2017-06-22 DIAGNOSIS — J96.21 ACUTE AND CHRONIC RESPIRATORY FAILURE WITH HYPOXIA (H): ICD-10-CM

## 2017-06-22 DIAGNOSIS — I48.91 ATRIAL FIBRILLATION WITH CONTROLLED VENTRICULAR RESPONSE (H): ICD-10-CM

## 2017-06-22 DIAGNOSIS — J81.0 ACUTE EDEMA OF LUNG (H): ICD-10-CM

## 2017-06-22 DIAGNOSIS — E78.5 HYPERLIPIDEMIA LDL GOAL <100: ICD-10-CM

## 2017-06-22 DIAGNOSIS — I50.9 ACUTE ON CHRONIC HEART FAILURE, UNSPECIFIED HEART FAILURE TYPE (H): ICD-10-CM

## 2017-06-22 DIAGNOSIS — M35.02 SJOGREN'S SYNDROME WITH LUNG INVOLVEMENT (H): Primary | ICD-10-CM

## 2017-06-22 LAB
ANION GAP SERPL CALCULATED.3IONS-SCNC: 7 MMOL/L (ref 3–14)
BUN SERPL-MCNC: 17 MG/DL (ref 7–30)
CALCIUM SERPL-MCNC: 8.3 MG/DL (ref 8.5–10.1)
CHLORIDE SERPL-SCNC: 101 MMOL/L (ref 94–109)
CHOLEST SERPL-MCNC: 101 MG/DL
CO2 SERPL-SCNC: 31 MMOL/L (ref 20–32)
CREAT SERPL-MCNC: 0.8 MG/DL (ref 0.52–1.04)
ERYTHROCYTE [DISTWIDTH] IN BLOOD BY AUTOMATED COUNT: 29 % (ref 10–15)
GFR SERPL CREATININE-BSD FRML MDRD: 69 ML/MIN/1.7M2
GLUCOSE SERPL-MCNC: 81 MG/DL (ref 70–99)
HCT VFR BLD AUTO: 37.8 % (ref 35–47)
HDLC SERPL-MCNC: 62 MG/DL
HGB BLD-MCNC: 11.1 G/DL (ref 11.7–15.7)
INR PPP: 1.64 (ref 0.86–1.14)
LDLC SERPL CALC-MCNC: 13 MG/DL
MCH RBC QN AUTO: 23.4 PG (ref 26.5–33)
MCHC RBC AUTO-ENTMCNC: 29.4 G/DL (ref 31.5–36.5)
MCV RBC AUTO: 80 FL (ref 78–100)
NONHDLC SERPL-MCNC: 39 MG/DL
NT-PROBNP SERPL-MCNC: 711 PG/ML (ref 0–450)
PLATELET # BLD AUTO: 273 10E9/L (ref 150–450)
POTASSIUM SERPL-SCNC: 3 MMOL/L (ref 3.4–5.3)
RBC # BLD AUTO: 4.74 10E12/L (ref 3.8–5.2)
SODIUM SERPL-SCNC: 139 MMOL/L (ref 133–144)
TRIGL SERPL-MCNC: 132 MG/DL
WBC # BLD AUTO: 10.5 10E9/L (ref 4–11)

## 2017-06-22 PROCEDURE — 36415 COLL VENOUS BLD VENIPUNCTURE: CPT | Performed by: FAMILY MEDICINE

## 2017-06-22 PROCEDURE — 99212 OFFICE O/P EST SF 10 MIN: CPT | Mod: ZF

## 2017-06-22 PROCEDURE — 99213 OFFICE O/P EST LOW 20 MIN: CPT | Mod: ZF

## 2017-06-22 PROCEDURE — 99214 OFFICE O/P EST MOD 30 MIN: CPT | Mod: ZP | Performed by: INTERNAL MEDICINE

## 2017-06-22 PROCEDURE — 80061 LIPID PANEL: CPT | Performed by: FAMILY MEDICINE

## 2017-06-22 PROCEDURE — 82306 VITAMIN D 25 HYDROXY: CPT | Performed by: FAMILY MEDICINE

## 2017-06-22 RX ORDER — FUROSEMIDE 40 MG
40 TABLET ORAL 2 TIMES DAILY
Qty: 180 TABLET | Refills: 3 | COMMUNITY
Start: 2017-06-22 | End: 2017-06-22

## 2017-06-22 RX ORDER — POTASSIUM CHLORIDE 1500 MG/1
40 TABLET, EXTENDED RELEASE ORAL DAILY
Qty: 180 TABLET | Refills: 3 | Status: SHIPPED | OUTPATIENT
Start: 2017-06-22 | End: 2017-10-06

## 2017-06-22 RX ORDER — FUROSEMIDE 40 MG
TABLET ORAL
Qty: 180 TABLET | Refills: 3 | COMMUNITY
Start: 2017-06-22 | End: 2017-08-01

## 2017-06-22 RX ORDER — FUROSEMIDE 40 MG
TABLET ORAL
Qty: 30 TABLET | COMMUNITY
Start: 2017-06-22 | End: 2017-08-25

## 2017-06-22 RX ORDER — SPIRONOLACTONE 25 MG/1
50 TABLET ORAL DAILY
Qty: 180 TABLET | Refills: 3 | Status: SHIPPED | OUTPATIENT
Start: 2017-06-22 | End: 2018-09-17

## 2017-06-22 ASSESSMENT — PAIN SCALES - GENERAL
PAINLEVEL: NO PAIN (0)
PAINLEVEL: NO PAIN (0)

## 2017-06-22 NOTE — LETTER
Patient:  Betty Tee  :   1940  MRN:     [id number]        Ms.Colleen GRISELDA Tee  [address] [facility] [address]        2017    Dear Betty,    Vitamin D level is lower than recommended (should be above 30). I recommend an over-the-counter supplement (calcium 600 mg + vitamin D 800 units, twice a day).    Please call with any questions.    Sincerely,  Carmenza Stringer MD       Resulted Orders   Vitamin D Deficiency   Result Value Ref Range    Vitamin D Deficiency screening 25 20 - 75 ug/L      Comment:      Season, race, dietary intake, and treatment affect the concentration of   25-hydroxy-Vitamin D. Values may decrease during winter months and increase   during summer months. Values 20-29 ug/L may indicate Vitamin D insufficiency   and values <20 ug/L may indicate Vitamin D deficiency.   Vitamin D determination is routinely performed by an immunoassay specific for   25 hydroxyvitamin D3.  If an individual is on vitamin D2 (ergocalciferol)   supplementation, please specify 25 OH vitamin D2 and D3 level determination   by   LCMSMS test VITD23.         Lab

## 2017-06-22 NOTE — MR AVS SNAPSHOT
After Visit Summary   6/22/2017    Betty Tee    MRN: [id number]           Patient Information     Date Of Birth          1940        Visit Information        Provider Department      6/22/2017 5:00 PM Radha Rosa MD [facility]        Today's Diagnoses     Acute on chronic heart failure, unspecified heart failure type (H)        Acute and chronic respiratory failure with hypoxia (H)        (HFpEF) heart failure with preserved ejection fraction (H)        Acute edema of lung (H)        Chronic diastolic congestive heart failure (H)          Care Instructions    Increase Lasix (Furosemide) 40 mg twice daily for Fri, Sat, Sun.  Water pill    On Monday, take Lasix 40 mg in AM, 20 mg in afternoon    Tonight take 60 mEq  Potassium,  3 pills    In AM increase Potassium 40 mg daily    Increase Spironolactone 50 mg (two 25 mg pills) daily    Labs next Thurs. 6/29 and on July 5th    Follow up CORE in 2 months,  Follow up Dr. Rosa in 4 months.      Sherrell Ramírez, RN  Cardiology Care Coordinator  Please be aware that I work part-time but I will be checking messages several times per week.   For urgent needs, please call the number below.    [phone], press 1 for Ziploop, press 3 to speak to a nurse    .          Follow-ups after your visit        Follow-up notes from your care team     Return in about 2 months (around 8/22/2017) for Physical Exam, Lab Work.      Your next 10 appointments already scheduled     Jun 26, 2017  1:30 PM CDT   DX HIP/PELVIS/SPINE with DX1   [facility] ([facility])    [address] [place] [address] [phone]           Please do not take any of the following 24 hours prior to the day of your exam: vitamins, calcium tablets, antacids.            Jun 29, 2017  1:00 PM CDT   Lab with  LAB   [facility] ([facility])    [address] [place] [address]  Floor  [facility] [address]   [phone]            Jul 05, 2017 11:00 AM CDT   Office Visit with Ilene Tristan MD   [facility] ([facility])    [address]  [facility] [address] [phone]           Bring a current list of meds and any records pertaining to this visit.  For Physicals, please bring immunization records and any forms needing to be filled out.  Please arrive 10 minutes early to complete paperwork.            Aug 24, 2017 12:30 PM CDT   Lab with  LAB    [facility] ([facility])    [address]  [address] [facility] [address]   [phone]            Aug 24, 2017  1:00 PM CDT   (Arrive by 12:45 PM)   CORE NEW with [facility]ZY [facility] ([facility])    [address]  [address] [facility] [address]   [phone]            Oct 04, 2017 11:00 AM CDT   (Arrive by 10:45 AM)   Return Interstitial Lung with Meño Walsh MD   [facility] ([facility])    [address]  [address] [facility] [address]   [phone]            Oct 26, 2017  2:30 PM CDT   Lab with  LAB   [facility] ([facility])    [address]  [address] [facility] [address]   [phone]            Oct 26, 2017  3:00 PM CDT   (Arrive by 2:45 PM)   RETURN HEART FAILURE with Radha Rosa MD   [facility] ([facility])    [address] [place] [address]   [phone]              Future tests that were ordered for you today     Open Future Orders        [facility] Expected Expires Ordered    Basic metabolic panel Routine 7/5/2017 6/22/2018 6/22/2017    Basic metabolic panel Routine 6/29/2017 6/22/2018 6/22/2017    Dexa hip/pelvis/spine Routine  1/18/2018 6/22/2017    General PFT Lab (Please always keep  "checked) Routine  6/21/2018 6/21/2017    Pulmonary Function Test Routine  6/21/2018 6/21/2017    PULMONARY REHAB REFERRAL Routine  6/21/2018 6/21/2017    6 minute walk test Routine  6/21/2018 6/21/2017            Who to contact     If you have questions or need follow up information about today's clinic visit or your schedule please contact [facility] directly at [phone].  Normal or non-critical lab and imaging results will be communicated to you by [employer], letter or phone within 4 business days after the clinic has received the results. If you do not hear from us within 7 days, please contact the clinic through [employer] or phone. If you have a critical or abnormal lab result, we will notify you by phone as soon as possible.  Submit refill requests through [employer] or call your pharmacy and they will forward the refill request to us. Please allow 3 business days for your refill to be completed.          Additional Information About Your Visit        Cliqset Information     [employer]set gives you secure access to your electronic health record. If you see a primary care provider, you can also send messages to your care team and make appointments. If you have questions, please call your primary care clinic.  If you do not have a primary care provider, please call [phone] and they will assist you.        Care EveryWhere ID     This is your Care EveryWhere ID. This could be used by other organizations to access your [place] medical records  [id number]        Your Vitals Were     Pulse Height Pulse Oximetry BMI (Body Mass Index)          58 1.676 m (5' 6\") 97% 32.44 kg/m2         Blood Pressure from Last 3 Encounters:   06/22/17 113/74   06/22/17 113/74   06/21/17 130/77    Weight from Last 3 Encounters:   06/22/17 91.2 kg (201 lb)   06/22/17 91.2 kg (201 lb)   06/21/17 90.7 kg (200 lb)                 Today's Medication Changes          These changes are accurate as of: 6/22/17  6:24 PM.  If you have any " questions, ask your nurse or doctor.               These medicines have changed or have updated prescriptions.        Dose/Directions    acyclovir 5 % ointment   Commonly known as:  ZOVIRAX   This may have changed:  additional instructions   Used for:  Recurrent cold sores        Apply topically 6 times daily   Quantity:  15 g   Refills:  3       fluticasone 50 MCG/ACT spray   Commonly known as:  FLONASE   This may have changed:    - when to take this  - reasons to take this   Used for:  Seasonal allergic rhinitis        Dose:  1-2 spray   Spray 1-2 sprays into both nostrils daily   Quantity:  16 g   Refills:  5       * furosemide 40 MG tablet   Commonly known as:  LASIX   This may have changed:  Another medication with the same name was added. Make sure you understand how and when to take each.   Changed by:  Radha Rosa MD        40 mg in AM, 20 mg in afternoon, starting Mon. 6/26   Quantity:  30 tablet   Refills:  0       * furosemide 40 MG tablet   Commonly known as:  LASIX   This may have changed:  You were already taking a medication with the same name, and this prescription was added. Make sure you understand how and when to take each.   Used for:  Acute edema of lung (H)   Changed by:  Radha Rosa MD        Take 40 mg twice, daily on Fri. Sat, Sun   Quantity:  180 tablet   Refills:  3       potassium chloride SA 20 MEQ CR tablet   Commonly known as:  K-DUR/KLOR-CON M   This may have changed:  how much to take   Used for:  Acute on chronic heart failure, unspecified heart failure type (H), Acute and chronic respiratory failure with hypoxia (H), (HFpEF) heart failure with preserved ejection fraction (H)   Changed by:  Radha Rosa MD        Dose:  40 mEq   Take 2 tablets (40 mEq) by mouth daily   Quantity:  180 tablet   Refills:  3       spironolactone 25 MG tablet   Commonly known as:  ALDACTONE   This may have changed:  how much to take   Used for:  Chronic diastolic  congestive heart failure (H)   Changed by:  Radha Rosa MD        Dose:  50 mg   Take 2 tablets (50 mg) by mouth daily   Quantity:  180 tablet   Refills:  3       * Notice:  This list has 2 medication(s) that are the same as other medications prescribed for you. Read the directions carefully, and ask your doctor or other care provider to review them with you.         Where to get your medicines      These medications [id number] [place]  [address]  [address] [place] [address] [facility] [address]     Phone:  [phone]     potassium chloride SA 20 MEQ CR tablet    spironolactone 25 MG tablet                Primary Care Provider Office Phone # Fax #    Ilene Tristan [phone]       [facility] [address] [address]        Equal Access to Services     [facility]: Hadii aad ku hadasho Soomaali, waaxda luqadaha, qaybta kaalmada adeegyada, waxay hannain hayaan claudio mercedes . So [facility] [phone].    ATENCIÓN: Si habla español, tiene a conti disposición servicios gratuitos de asistencia lingüística. Noel al [phone].    We comply with applicable federal civil rights laws and [place] laws. We do not discriminate on the basis of race, color, national origin, age, disability sex, sexual orientation or gender identity.            Thank you!     Thank you for choosing [facility]  for your care. Our goal is always to provide you with excellent care. Hearing back from our patients is one way we can continue to improve our services. Please take a few minutes to complete the written survey that you may receive in the mail after your visit with us. Thank you!             Your Updated Medication List - Protect others around you: Learn how to safely use, store and throw away your medicines at www.disposemymeds.org.          This list is accurate as of: 6/22/17  6:24 PM.  Always use your  most recent med list.                   Brand Name Dispense Instructions for use Diagnosis    acyclovir 400 MG tablet    ZOVIRAX     Take 400 mg by mouth See Admin Instructions 5 times daily as needed for outbreaks        acyclovir 5 % ointment    ZOVIRAX    15 g    Apply topically 6 times daily    Recurrent cold sores       albuterol 108 (90 BASE) MCG/ACT Inhaler    PROAIR HFA/PROVENTIL HFA/VENTOLIN HFA    1 Inhaler    Inhale 2 puffs into the lungs every 6 hours    ILD (interstitial lung disease) (H)       atorvastatin 40 MG tablet    LIPITOR    90 tablet    Take 1 tablet (40 mg) by mouth daily    Type 2 diabetes mellitus with other specified complication (H)       * blood glucose monitoring lancets     4 Box    Use to test blood sugar 4 times daily or as directed.    Type 2 diabetes mellitus without complication, without long-term current use of insulin (H)       * blood glucose monitoring lancets     1 Box    Use to test blood sugar 4 times daily or as directed.  Ok to substitute alternative if insurance prefers.    Type 2 diabetes mellitus without complication, without long-term current use of insulin (H)       * blood glucose monitoring test strip    no brand specified    300 strip    Use to test blood sugars 4 times daily or as directed    Type 2 diabetes mellitus without complication, without long-term current use of insulin (H)       * blood glucose monitoring test strip    ACCU-CHEK SHANNON PLUS    120 strip    Use to test blood sugar 4 times daily or as directed.  Ok to substitute alternative if insurance prefers.    Type 2 diabetes mellitus without complication, without long-term current use of insulin (H)       COMPRESSION STOCKINGS     2 each    Wear compression stockings in affected leg (right leg) or both legs most of the time during the day and take them off at night.    DVT (deep venous thrombosis), right, Postphlebitic syndrome, Chronic anticoagulation, Atrial fibrillation (H)       ferrous sulfate  325 (65 FE) MG tablet    IRON    30 tablet    Take 1 tablet (325 mg) by mouth daily (with breakfast)    Iron deficiency anemia due to chronic blood loss       fluticasone 220 MCG/ACT Inhaler    FLOVENT HFA    3 Inhaler    Inhale 2 puffs into the lungs 2 times daily    ILD (interstitial lung disease) (H), Follicular bronchiolitis (H)       fluticasone 50 MCG/ACT spray    FLONASE    16 g    Spray 1-2 sprays into both nostrils daily    Seasonal allergic rhinitis       * furosemide 40 MG tablet    LASIX    30 tablet    40 mg in AM, 20 mg in afternoon, starting Mon. 6/26        * furosemide 40 MG tablet    LASIX    180 tablet    Take 40 mg twice, daily on Fri. Sat, Sun    Acute edema of lung (H)       insulin aspart 100 UNIT/ML injection    NovoLOG FLEXPEN    45 mL    Inject 12 Units Subcutaneous 3 times daily (with meals)    Type 2 diabetes mellitus with other specified complication (H)       * insulin pen needle 31G X 8 MM    B-D U/F    400 each    Use 4 times daily (with Lantus and Novolog) or as directed.    Type 2 diabetes mellitus without complication, without long-term current use of insulin (H)       * insulin pen needle 32G X 4 MM    BD JANE U/F    200 each    Use 4 daily as directed.    Type 2 diabetes mellitus without complication, without long-term current use of insulin (H)       ketoconazole 2 % cream    NIZORAL    60 g    Apply topically 2 times daily    Cutaneous candidiasis       lactobacillus rhamnosus (GG) capsule     90 capsule    Take 1 capsule by mouth 3 times daily (before meals)    Pneumonia due to infectious organism, unspecified laterality, unspecified part of lung       LANTUS SOLOSTAR 100 UNIT/ML injection   Generic drug:  insulin glargine     15 mL    INJECT 25 UNTIS SUBCUTANEOUSLY EVERY DAY    Type 2 diabetes mellitus with hyperglycemia, with long-term current use of insulin (H)       lisinopril 2.5 MG tablet    PRINIVIL/Zestril    90 tablet    Take 1 tablet (2.5 mg) by mouth daily     Essential hypertension with goal blood pressure less than 140/90       metFORMIN 500 MG 24 hr tablet    GLUCOPHAGE-XR    180 tablet    Take 2 tablets (1,000 mg) by mouth daily (with dinner)    Type 2 diabetes mellitus without complication, without long-term current use of insulin (H)       metoprolol 100 MG 24 hr tablet    TOPROL-XL    90 tablet    Take 1 tablet (100 mg) by mouth daily    Atrial fibrillation with controlled ventricular response (H)       montelukast 10 MG tablet    SINGULAIR    90 tablet    Take 1 tablet (10 mg) by mouth At Bedtime    Sjogren's syndrome (H), ILD (interstitial lung disease) (H)       * order for DME     1 Device    Oxygen: Patient requires supplemental Oxygen 4 LPM via nasal canula with activity. Please provide patient with a home unit and with portability capability. Oxygen will be for a lifetime.    Hypoxia, ILD (interstitial lung disease) (H)       * order for DME     1 Device    Oxygen: Patient requires supplemental Oxygen 4 LPM via nasal canula with activity. Please provide patient with POC to improve mobility, okay to titrate for conserving to keep stats above 90%. Please fax results to [phone].  Oxygen will be for a lifetime.    Hypoxia, ILD (interstitial lung disease) (H)       * pantoprazole 20 MG EC tablet    PROTONIX    60 tablet    Take 1-2 tablets (20-40 mg) by mouth daily    Gastroesophageal reflux disease without esophagitis       * pantoprazole 40 MG EC tablet    PROTONIX    90 tablet    Take 1 tablet (40 mg) by mouth daily    Gastroesophageal reflux disease without esophagitis       PARoxetine 20 MG tablet    PAXIL    20 tablet    TAKE 1 TABLET (10mg) BY MOUTH AT BEDTIME    Major depressive disorder, recurrent episode, moderate (H)       polyethylene glycol Packet    MIRALAX/GLYCOLAX    7 packet    Take 17 g by mouth daily as needed for constipation    Constipation, unspecified constipation type       potassium chloride SA 20 MEQ CR tablet    K-DUR/KLOR-CON M     180 tablet    Take 2 tablets (40 mEq) by mouth daily    Acute on chronic heart failure, unspecified heart failure type (H), Acute and chronic respiratory failure with hypoxia (H), (HFpEF) heart failure with preserved ejection fraction (H)       * predniSONE 10 MG tablet    DELTASONE    90 tablet    Take 2 tablets for three days (4/12-14), then take 1 tablet daily (10 mg daily starting 4/15)    ILD (interstitial lung disease) (H), Sjogren's syndrome (H)       * predniSONE 1 MG tablet    DELTASONE    30 tablet    Take 1 tablet (1 mg) by mouth daily    ILD (interstitial lung disease) (H)       spironolactone 25 MG tablet    ALDACTONE    180 tablet    Take 2 tablets (50 mg) by mouth daily    Chronic diastolic congestive heart failure (H)       traZODone 50 MG tablet    DESYREL    90 tablet    TAKE 1/2-1 TABLET BY MOUTH NIGHTLY AS NEEDED, CAN TITRATE DOWN TO 1/2TAB OR UP TO 2TABS AS NEEDED    Insomnia due to medical condition       TYLENOL 500 MG tablet   Generic drug:  acetaminophen      Take 500 mg by mouth nightly as needed    Dizziness       warfarin 7.5 MG tablet    COUMADIN    100 tablet    Current dose is 7.5 mg daily.  Dose adjusted per INR result.    Atrial fibrillation with controlled ventricular response (H)       * Notice:  This list has 14 medication(s) that are the same as other medications prescribed for you. Read the directions carefully, and ask your doctor or other care provider to review them with you.

## 2017-06-22 NOTE — PROGRESS NOTES
Betty is a 77 year old female presents today for follow up on Sjogren's Syndrome with ILD. She was a patient of Dr Navarrete's who is transferring care.    #1 Sjogren syndrome with borderline positive lip biopsy, low titer RG and low titer SSA antibody, sicca symptoms, ILD with reticular opacities in lungs, paratrachial lymphadenopathy dx 2015  #2 Right submandibular gland swelling FNA negative for lymphoma  #3 Elevated IgG4  #4 Episodic prednisone responsive arthropathy   #5 Moderate osteopenia and chronic steroid use     Subjective: Pt feels pretty good today.  She says that since she had a burst of steroids in April she doesn't have joint pain and is breathing a little easier.  She was hospitalized from 4/2-4/10/17 with pneumonia, ILD exacerbation, new onset diabetes, and CHF. She was started on a burst of steroids in the ICU due to respiratory failure and her symptoms improved. She is here today to consult about other treatment options now that she is tapering off of the prednisone. She currently takes 10mg per day.     She has a history of long term prednisone use currently on 10mg per day. She has a history of moderate osteopenia 11/2015.  She is not currently on fosamax and it is not clear if she has been on it in the past.     HPI  Pt was diagnosed with Primary Sjogren Syndrome in 2015 due to borderline +RG, +SSA, and lip biopsy focus score of 1.  Her ILD and joint pain are prednisone-responsive which support the diagnosis. Ocular staining score was deferred given the other sources of diagnosis.      Past Medical History  Past Medical History:   Diagnosis Date     Alcohol abuse, in remission      Allergic rhinitis, cause unspecified     allegra helps when she takes it     Antiplatelet or antithrombotic long-term use      Atrial fibrillation (H)     in hosp in 11/11 after surgery w/ fluid overload     Cardiomegaly     LVH on stress echo- cardiac w/u at [facility]- neg CT scan for PE, neg stress echo in  8/06     Chest pain, unspecified      Disorder of bone and cartilage, unspecified     osteopenia (had been on prempro), improved on 6/06 dexa, stable dexa 11/10     Diverticulosis of colon (without mention of hemorrhage)     last episode yrs ago     Essential hypertension, benign      Gastro-oesophageal reflux disease      Insomnia, unspecified     weaned off clonazepam     Irregular heart beat      Lumbago 7/09    MRI with [facility], now seeing Dr. Cain for sciatic sx's     Major depressive disorder, recurrent episode, moderate (H)      Obstructive sleep apnea      Osteoarthrosis, unspecified whether generalized or localized, unspecified site      Sjogren's syndrome (H)      Sleep apnea      Tobacco use disorder     chantix in 9/07, started again in 6/08, working       Allergies  Allergies   Allergen Reactions     Augmentin Nausea and Vomiting     Codeine Nausea and Vomiting     Phenobarbital Itching     Medications  Current Outpatient Prescriptions   Medication Sig Dispense Refill     predniSONE (DELTASONE) 1 MG tablet Take 1 tablet (1 mg) by mouth daily 30 tablet 3     traZODone (DESYREL) 50 MG tablet TAKE 1/2-1 TABLET BY MOUTH NIGHTLY AS NEEDED, CAN TITRATE DOWN TO 1/2TAB OR UP TO 2TABS AS NEEDED 90 tablet 1     PARoxetine (PAXIL) 20 MG tablet TAKE 1 TABLET (10mg) BY MOUTH AT BEDTIME 20 tablet 3     furosemide (LASIX) 40 MG tablet Take 0.5 tablets (20 mg) by mouth 2 times daily 180 tablet 3     ferrous sulfate (IRON) 325 (65 FE) MG tablet Take 1 tablet (325 mg) by mouth daily (with breakfast) 30 tablet 3     LANTUS SOLOSTAR 100 UNIT/ML soln INJECT 25 UNTIS SUBCUTANEOUSLY EVERY DAY 15 mL 1     insulin aspart (NOVOLOG FLEXPEN) 100 UNIT/ML injection Inject 12 Units Subcutaneous 3 times daily (with meals) 45 mL 0     lisinopril (PRINIVIL/ZESTRIL) 2.5 MG tablet Take 1 tablet (2.5 mg) by mouth daily 90 tablet 1     potassium chloride SA (K-DUR/KLOR-CON M) 20 MEQ CR tablet Take 1 tablet (20 mEq) by mouth daily 90 tablet 0      atorvastatin (LIPITOR) 40 MG tablet Take 1 tablet (40 mg) by mouth daily 90 tablet 1     lactobacillus rhamnosus, GG, (CULTURELL) capsule Take 1 capsule by mouth 3 times daily (before meals) 90 capsule 1     pantoprazole (PROTONIX) 40 MG EC tablet Take 1 tablet (40 mg) by mouth daily 90 tablet 1     blood glucose monitoring (NO BRAND SPECIFIED) test strip Use to test blood sugars 4 times daily or as directed 300 strip 3     blood glucose monitoring (ACCU-CHEK MULTICLIX) lancets Use to test blood sugar 4 times daily or as directed. 4 Box 3     insulin pen needle (B-D U/F) 31G X 8 MM Use 4 times daily (with Lantus and Novolog) or as directed. 400 each 3     insulin pen needle (BD JANE U/F) 32G X 4 MM Use 4 daily as directed. 200 each 11     blood glucose monitoring (ACCU-CHEK SHANNON PLUS) test strip Use to test blood sugar 4 times daily or as directed.  Ok to substitute alternative if insurance prefers. 120 strip 11     blood glucose monitoring (ACCU-CHEK FASTCLIX) lancets Use to test blood sugar 4 times daily or as directed.  Ok to substitute alternative if insurance prefers. 1 Box 11     predniSONE (DELTASONE) 10 MG tablet Take 2 tablets for three days (4/12-14), then take 1 tablet daily (10 mg daily starting 4/15) 90 tablet 3     metFORMIN (GLUCOPHAGE-XR) 500 MG 24 hr tablet Take 2 tablets (1,000 mg) by mouth daily (with dinner) 180 tablet 0     order for DME Oxygen: Patient requires supplemental Oxygen 4 LPM via nasal canula with activity. Please provide patient with POC to improve mobility, okay to titrate for conserving to keep stats above 90%. Please fax results to [phone].  Oxygen will be for a lifetime. 1 Device 0     pantoprazole (PROTONIX) 20 MG EC tablet Take 1-2 tablets (20-40 mg) by mouth daily 60 tablet 1     ketoconazole (NIZORAL) 2 % cream Apply topically 2 times daily 60 g 1     metoprolol (TOPROL-XL) 100 MG 24 hr tablet Take 1 tablet (100 mg) by mouth daily 90 tablet 3     spironolactone  (ALDACTONE) 25 MG tablet Take 1 tablet (25 mg) by mouth daily 90 tablet 3     montelukast (SINGULAIR) 10 MG tablet Take 1 tablet (10 mg) by mouth At Bedtime 90 tablet 1     albuterol (PROAIR HFA, PROVENTIL HFA, VENTOLIN HFA) 108 (90 BASE) MCG/ACT inhaler Inhale 2 puffs into the lungs every 6 hours 1 Inhaler 3     order for DME Oxygen: Patient requires supplemental Oxygen 4 LPM via nasal canula with activity. Please provide patient with a home unit and with portability capability. Oxygen will be for a lifetime. 1 Device 0     polyethylene glycol (MIRALAX/GLYCOLAX) packet Take 17 g by mouth daily as needed for constipation 7 packet 0     warfarin (COUMADIN) 7.5 MG tablet Current dose is 7.5 mg daily.  Dose adjusted per INR result. 100 tablet 3     acyclovir (ZOVIRAX) 5 % ointment Apply topically 6 times daily (Patient taking differently: Apply topically 6 times daily As needed for outbreaks) 15 g 3     fluticasone (FLOVENT HFA) 220 MCG/ACT inhaler Inhale 2 puffs into the lungs 2 times daily 3 Inhaler 3     acyclovir (ZOVIRAX) 400 MG tablet Take 400 mg by mouth See Admin Instructions 5 times daily as needed for outbreaks       fluticasone (FLONASE) 50 MCG/ACT nasal spray Spray 1-2 sprays into both nostrils daily (Patient taking differently: Spray 1-2 sprays into both nostrils daily as needed for allergies ) 16 g 5     COMPRESSION STOCKINGS Wear compression stockings in affected leg (right leg) or both legs most of the time during the day and take them off at night. 2 each 2     acetaminophen (TYLENOL) 500 MG tablet Take 500 mg by mouth nightly as needed        Family History  family history includes Alcohol/Drug in her father; Alzheimer Disease in her father; Asthma in her sister; Breast Cancer in her daughter; C.A.D. (age of onset: 52) in her sister; C.A.D. (age of onset: 63) in her mother; CANCER in her sister and sister; CEREBROVASCULAR DISEASE in her mother; Cancer - colorectal (age of onset: 48) in her sister;  "DIABETES in her sister; Dementia in her father; GASTROINTESTINAL DISEASE in her brother and sister; HEART DISEASE in her mother, sister, and sister; Hyperlipidemia in her brother, father, and mother; Hypertension in her brother, father, mother, sister, and sister; Lipids in her sister and sister; Parkinsonism in her brother; Prostate Cancer in her brother; Prostate Cancer (age of onset: 74) in her brother; Substance Abuse in her brother and sister.  Social History  Social History   Substance Use Topics     Smoking status: Former Smoker     Packs/day: 0.50     Years: 10.00     Types: Cigarettes     Quit date: 8/1/2011     Smokeless tobacco: Never Used      Comment: 1/2 ppd     Alcohol use No      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       ROS  Skin: negative  Eyes: negative  Ears/Nose/Throat: negative  Respiratory: did well on 6 minute walk test.  Feels like she is using less oxygen at home and with activity  Endocrine: negative  Cardiovascular: well controlled a fib and chf   Gastrointestinal: negative  Genitourinary: negative  Musculoskeletal: negative  Neurologic: negative  Psychiatric: negative  Rheumatology: no morning stiffness    Physical Exam  /74  Pulse 58  Temp 98  F (36.7  C) (Oral)  Ht 1.689 m (5' 6.5\")  Wt 91.2 kg (201 lb)  BMI 31.96 kg/m2  Body mass index is 31.96 kg/(m^2).    Physical Exam   Constitutional: She is oriented to person, place, and time. She appears well-developed and well-nourished.   HENT:   Head: Normocephalic.   Eyes: Conjunctivae are normal.   Neck: Normal range of motion.   Cardiovascular:   No murmur heard.  Irregularly irregular, non-tachycardic    Pulmonary/Chest: Effort normal and breath sounds normal. No respiratory distress.   Musculoskeletal: Normal range of motion. She exhibits no edema.   Neurological: She is alert and oriented to person, place, and time.   Skin: No rash noted.   Dry elbows bilaterally    Psychiatric: She has a normal mood and affect. Her " behavior is normal. Judgment and thought content normal.   Vitals reviewed.            RESULTS  Lab Results   Component Value Date    HGB 9.4 05/31/2017     Lab Results   Component Value Date    TSH 3.12 09/24/2004              Lab Results   Component Value Date    LDL 56 09/20/2016        No results found for: MICROALBUMIN  Lab Results   Component Value Date    ALBUMIN 3.0 04/02/2017           Lab Results   Component Value Date    ALT 19 04/02/2017               No results found for: CREATININE          ASSESSMENT and PLAN    1. Sjogren's Syndrome with ILD.  Pt is going rather well.  She had a burst of prednisone when she was hospitalized for CHF, ILD and pneunmonia and is now on 10mg of prednisone a day. She has been relatively symptom free since then.  She did well on a 6 minute walk test. Her eyes and mouth dryness are well managed. Will decrease prednisone 1mg every 2 weeks until off or at least below 5mg a day.  Can consider periodic bursts in the future and possibly biologic therapy if condition worsens.      We discussed the possibility of starting Rituximab, which could be helpful in Sjogren's ILD and can decrease reliance on long-term steroids. However, I would only consider Rituximab if there's ILD progression. Otherwise, long-term low-dose prednisone is reasonable given patient's age and comorbidities.    2. High risk of osteoporosis in a setting of long-term prednisone use, no hx of fracture. dexa in 2015 wnl.   Start vitamin D-Ca supplementation, get repeat DEXA. Has not been on bisphosphonate therapy in the past and will likely benefit - will decide based on DEXA results.    RTC in 3 months    Pt seen and examined with Dr Stringer.     DO [facility] Resident      I was present with resident during key aspects of history and physical examination, was directly involved in medical decision making and management of this patient, and I agree with the resident s documentation,  findings, and plan. I edited the assessment and plan to reflect my recommendations.    I discussed the findings and recommendations with the patient.  Recommendations were discussed with the consulting physician.  Time spent: 60 minutes    Carmenza Stringer MD, MS  Rheumatology Attending Physician  pgr [phone]

## 2017-06-22 NOTE — NURSING NOTE
"Chief Complaint   Patient presents with     Consult     consult with kwame cooper cma       Initial /74  Pulse 58  Temp 98  F (36.7  C) (Oral)  Ht 1.689 m (5' 6.5\")  Wt 91.2 kg (201 lb)  BMI 31.96 kg/m2 Estimated body mass index is 31.96 kg/(m^2) as calculated from the following:    Height as of this encounter: 1.689 m (5' 6.5\").    Weight as of this encounter: 91.2 kg (201 lb).  Medication Reconciliation: complete    "

## 2017-06-22 NOTE — MR AVS SNAPSHOT
After Visit Summary   6/22/2017    Betty Tee    MRN: [id number]           Patient Information     Date Of Birth          1940        Visit Information        Provider Department      6/22/2017 3:00 PM Carmenza Stringer MD [facility]        Today's Diagnoses     Sjogren's syndrome (H)    -  1    Long term (current) use of systemic steroids        Disorder of bone           Care Instructions    - DEXA scan today  - Expect a call regarding your vitamin D levels  - Read about Rituximab, possible option if your symptoms come back when you decrease prednisone.    - lease call if you have joint pain once you start lowering prednisone.          Follow-ups after your visit        Follow-up notes from your care team     Return in about 3 months (around 9/22/2017).      Your next 10 appointments already scheduled     Jun 22, 2017  4:30 PM CDT   Lab with  LAB   [facility] ([facility])    [address] [place] [address]  [address] [facility] [id number] [phone]            Jun 22, 2017  5:00 PM CDT   (Arrive by 4:45 PM)   RETURN HEART FAILURE with Radha Rosa MD   [facility] ([facility])    [address] [place] [address]  [address] [facility] [id number] [phone]            Jul 05, 2017 11:00 AM CDT   Office Visit with Ilene Tristan MD   [facility] ([facility])    [address] [facility] [address] [phone]           Bring a current list of meds and any records pertaining to this visit.  For Physicals, please bring immunization records and any forms needing to be filled out.  Please arrive 10 minutes early to complete paperwork.            Oct 04, 2017 11:00 AM CDT   (Arrive by 10:45 AM)   Return Interstitial Lung with Meño Walsh MD   [facility] ([facility])    [address] [place] [address]  "[facility] [address] [phone]              Future tests that were ordered for you today     Open Future Orders        [facility] Expected Expires Ordered    Vitamin D Deficiency Routine  6/22/2018 6/22/2017    Dexa hip/pelvis/spine Routine  1/18/2018 6/22/2017    General PFT Lab (Please always keep checked) Routine  6/21/2018 6/21/2017    Pulmonary Function Test Routine  6/21/2018 6/21/2017    PULMONARY REHAB REFERRAL Routine  6/21/2018 6/21/2017    6 minute walk test Routine  6/21/2018 6/21/2017            Who to contact     If you have questions or need follow up information about today's clinic visit or your schedule please contact [facility] R[facility]AT[facility] directly at [phone].  Normal or non-critical lab and imaging results will be communicated to you by MyChart, letter or phone within 4 business days after the clinic has received the results. If you do not hear from us within 7 days, please contact the clinic through [employer] or phone. If you have a critical or abnormal lab result, we will notify you by phone as soon as possible.  Submit refill requests through [employer] or call your pharmacy and they will forward the refill request to us. Please allow 3 business days for your refill to be completed.          Additional Information About Your Visit        [employer] Information     [employer] gives you secure access to your electronic health record. If you see a primary care provider, you can also send messages to your care team and make appointments. If you have questions, please call your primary care clinic.  If you do not have a primary care provider, please call [phone] and they will assist you.        Care EveryWhere ID     This is your Care EveryWhere ID. This could be used by other organizations to access your [facility] medical records  [id number]        Your Vitals Were     Pulse Temperature Height BMI (Body Mass Index)          58 98  F (36.7  C) (Oral) 1.689 m (5' 6.5\") 31.96 kg/m2      "    Blood Pressure from Last 3 Encounters:   06/22/17 113/74   06/21/17 130/77   05/31/17 113/68    Weight from Last 3 Encounters:   06/22/17 91.2 kg (201 lb)   06/21/17 90.7 kg (200 lb)   05/31/17 91.6 kg (202 lb)                 Today's Medication Changes          These changes are accurate as of: 6/22/17  4:24 PM.  If you have any questions, ask your nurse or doctor.               These medicines have changed or have updated prescriptions.        Dose/Directions    acyclovir 5 % ointment   Commonly known as:  ZOVIRAX   This may have changed:  additional instructions   Used for:  Recurrent cold sores        Apply topically 6 times daily   Quantity:  15 g   Refills:  3       fluticasone 50 MCG/ACT spray   Commonly known as:  FLONASE   This may have changed:    - when to take this  - reasons to take this   Used for:  Seasonal allergic rhinitis        Dose:  1-2 spray   Spray 1-2 sprays into both nostrils daily   Quantity:  16 g   Refills:  5                Primary Care Provider Office Phone # Fax #    Ilene Tristan [phone]       [facility] [address] [address]        Equal Access to Services     GENNY STONER AH: Hadii jossue ku hadasho Soomaali, waaxda luqadaha, qaybta kaalmada adeegyada, anderson shaikh. So [facility] [phone].    ATENCIÓN: Si habla español, tiene a conti disposición servicios gratuitos de asistencia lingüística. Llame al [phone].    We comply with applicable federal civil rights laws and [place] laws. We do not discriminate on the basis of race, color, national origin, age, disability sex, sexual orientation or gender identity.            Thank you!     Thank you for choosing [facility] RHEUMAT[facility]  for your care. Our goal is always to provide you with excellent care. Hearing back from our patients is one way we can continue to improve our services. Please take a few minutes to complete the written survey that  you may receive in the mail after your visit with us. Thank you!             Your Updated Medication List - Protect others around you: Learn how to safely use, store and throw away your medicines at www.disposemymeds.org.          This list is accurate as of: 6/22/17  4:24 PM.  Always use your most recent med list.                   Brand Name Dispense Instructions for use Diagnosis    acyclovir 400 MG tablet    ZOVIRAX     Take 400 mg by mouth See Admin Instructions 5 times daily as needed for outbreaks        acyclovir 5 % ointment    ZOVIRAX    15 g    Apply topically 6 times daily    Recurrent cold sores       albuterol 108 (90 BASE) MCG/ACT Inhaler    PROAIR HFA/PROVENTIL HFA/VENTOLIN HFA    1 Inhaler    Inhale 2 puffs into the lungs every 6 hours    ILD (interstitial lung disease) (H)       atorvastatin 40 MG tablet    LIPITOR    90 tablet    Take 1 tablet (40 mg) by mouth daily    Type 2 diabetes mellitus with other specified complication (H)       * blood glucose monitoring lancets     4 Box    Use to test blood sugar 4 times daily or as directed.    Type 2 diabetes mellitus without complication, without long-term current use of insulin (H)       * blood glucose monitoring lancets     1 Box    Use to test blood sugar 4 times daily or as directed.  Ok to substitute alternative if insurance prefers.    Type 2 diabetes mellitus without complication, without long-term current use of insulin (H)       * blood glucose monitoring test strip    no brand specified    300 strip    Use to test blood sugars 4 times daily or as directed    Type 2 diabetes mellitus without complication, without long-term current use of insulin (H)       * blood glucose monitoring test strip    ACCU-CHEK SHANNON PLUS    120 strip    Use to test blood sugar 4 times daily or as directed.  Ok to substitute alternative if insurance prefers.    Type 2 diabetes mellitus without complication, without long-term current use of insulin (H)        COMPRESSION STOCKINGS     2 each    Wear compression stockings in affected leg (right leg) or both legs most of the time during the day and take them off at night.    DVT (deep venous thrombosis), right, Postphlebitic syndrome, Chronic anticoagulation, Atrial fibrillation (H)       ferrous sulfate 325 (65 FE) MG tablet    IRON    30 tablet    Take 1 tablet (325 mg) by mouth daily (with breakfast)    Iron deficiency anemia due to chronic blood loss       fluticasone 220 MCG/ACT Inhaler    FLOVENT HFA    3 Inhaler    Inhale 2 puffs into the lungs 2 times daily    ILD (interstitial lung disease) (H), Follicular bronchiolitis (H)       fluticasone 50 MCG/ACT spray    FLONASE    16 g    Spray 1-2 sprays into both nostrils daily    Seasonal allergic rhinitis       furosemide 40 MG tablet    LASIX    180 tablet    Take 0.5 tablets (20 mg) by mouth 2 times daily    Acute edema of lung (H)       insulin aspart 100 UNIT/ML injection    NovoLOG FLEXPEN    45 mL    Inject 12 Units Subcutaneous 3 times daily (with meals)    Type 2 diabetes mellitus with other specified complication (H)       * insulin pen needle 31G X 8 MM    B-D U/F    400 each    Use 4 times daily (with Lantus and Novolog) or as directed.    Type 2 diabetes mellitus without complication, without long-term current use of insulin (H)       * insulin pen needle 32G X 4 MM    BD JANE U/F    200 each    Use 4 daily as directed.    Type 2 diabetes mellitus without complication, without long-term current use of insulin (H)       ketoconazole 2 % cream    NIZORAL    60 g    Apply topically 2 times daily    Cutaneous candidiasis       lactobacillus rhamnosus (GG) capsule     90 capsule    Take 1 capsule by mouth 3 times daily (before meals)    Pneumonia due to infectious organism, unspecified laterality, unspecified part of lung       LANTUS SOLOSTAR 100 UNIT/ML injection   Generic drug:  insulin glargine     15 mL    INJECT 25 UNTIS SUBCUTANEOUSLY EVERY DAY    Type 2  diabetes mellitus with hyperglycemia, with long-term current use of insulin (H)       lisinopril 2.5 MG tablet    PRINIVIL/Zestril    90 tablet    Take 1 tablet (2.5 mg) by mouth daily    Essential hypertension with goal blood pressure less than 140/90       metFORMIN 500 MG 24 hr tablet    GLUCOPHAGE-XR    180 tablet    Take 2 tablets (1,000 mg) by mouth daily (with dinner)    Type 2 diabetes mellitus without complication, without long-term current use of insulin (H)       metoprolol 100 MG 24 hr tablet    TOPROL-XL    90 tablet    Take 1 tablet (100 mg) by mouth daily    Atrial fibrillation with controlled ventricular response (H)       montelukast 10 MG tablet    SINGULAIR    90 tablet    Take 1 tablet (10 mg) by mouth At Bedtime    Sjogren's syndrome (H), ILD (interstitial lung disease) (H)       * order for DME     1 Device    Oxygen: Patient requires supplemental Oxygen 4 LPM via nasal canula with activity. Please provide patient with a home unit and with portability capability. Oxygen will be for a lifetime.    Hypoxia, ILD (interstitial lung disease) (H)       * order for DME     1 Device    Oxygen: Patient requires supplemental Oxygen 4 LPM via nasal canula with activity. Please provide patient with POC to improve mobility, okay to titrate for conserving to keep stats above 90%. Please fax results to [phone].  Oxygen will be for a lifetime.    Hypoxia, ILD (interstitial lung disease) (H)       * pantoprazole 20 MG EC tablet    PROTONIX    60 tablet    Take 1-2 tablets (20-40 mg) by mouth daily    Gastroesophageal reflux disease without esophagitis       * pantoprazole 40 MG EC tablet    PROTONIX    90 tablet    Take 1 tablet (40 mg) by mouth daily    Gastroesophageal reflux disease without esophagitis       PARoxetine 20 MG tablet    PAXIL    20 tablet    TAKE 1 TABLET (10mg) BY MOUTH AT BEDTIME    Major depressive disorder, recurrent episode, moderate (H)       polyethylene glycol Packet     MIRALAX/GLYCOLAX    7 packet    Take 17 g by mouth daily as needed for constipation    Constipation, unspecified constipation type       potassium chloride SA 20 MEQ CR tablet    K-DUR/KLOR-CON M    90 tablet    Take 1 tablet (20 mEq) by mouth daily    Acute on chronic heart failure, unspecified heart failure type (H), Acute and chronic respiratory failure with hypoxia (H), (HFpEF) heart failure with preserved ejection fraction (H)       * predniSONE 10 MG tablet    DELTASONE    90 tablet    Take 2 tablets for three days (4/12-14), then take 1 tablet daily (10 mg daily starting 4/15)    ILD (interstitial lung disease) (H), Sjogren's syndrome (H)       * predniSONE 1 MG tablet    DELTASONE    30 tablet    Take 1 tablet (1 mg) by mouth daily    ILD (interstitial lung disease) (H)       spironolactone 25 MG tablet    ALDACTONE    90 tablet    Take 1 tablet (25 mg) by mouth daily    Chronic diastolic congestive heart failure (H)       traZODone 50 MG tablet    DESYREL    90 tablet    TAKE 1/2-1 TABLET BY MOUTH NIGHTLY AS NEEDED, CAN TITRATE DOWN TO 1/2TAB OR UP TO 2TABS AS NEEDED    Insomnia due to medical condition       TYLENOL 500 MG tablet   Generic drug:  acetaminophen      Take 500 mg by mouth nightly as needed    Dizziness       warfarin 7.5 MG tablet    COUMADIN    100 tablet    Current dose is 7.5 mg daily.  Dose adjusted per INR result.    Atrial fibrillation with controlled ventricular response (H)       * Notice:  This list has 12 medication(s) that are the same as other medications prescribed for you. Read the directions carefully, and ask your doctor or other care provider to review them with you.

## 2017-06-22 NOTE — NURSING NOTE
Chief Complaint   Patient presents with     Follow Up For     5 mo. f/u for HFpEF, AF,resp. problems. labs today     Vitals were taken and medications were reconciled.     CHARLES Vigil  4:31 PM

## 2017-06-22 NOTE — LETTER
6/22/2017      RE: Betty Tee  [address]  [facility] [address]       Dear Colleague,    Thank you for the opportunity to participate in the care of your patient, Betty Tee, at the [facility]. Please see a copy of my visit note below.    CC:  Follow up HFpEF    HPI:   77 yo F with interstitial lung disease (possible moderate restriction), Sjogren's syndrome, HTN, permanent atrial fibrillation, diverticulitis s/p colectomy 2011 who was referred to us by EP clinic to establish care for HFpEF. I have seen her one time previous to this.     In July she was hospitalized for lower GI bleed and Afib,  Echo showed normal EF.    She started having dyspnea with activity this summer. Her symptoms improved after we increased her diuretics. Presently, she can walk 1 block or climb 1 flight before becoming SOB. She has orthopnea and no PND. Her leg swelling is improved from the last we saw her and is now minimal.  Her lightheadedness is improved from the last I saw her.     She is now on lasix dose 40mg AM and 40mg PM.     Uses 4L oxygen with exertional activities like grocery shopping, climbing stairs etc. Doesn't use it at rest or at night.     PAST MEDICAL HISTORY:  Past Medical History:   Diagnosis Date     Alcohol abuse, in remission      Allergic rhinitis, cause unspecified     allegra helps when she takes it     Antiplatelet or antithrombotic long-term use      Atrial fibrillation (H)     in hosp in 11/11 after surgery w/ fluid overload     Cardiomegaly     LVH on stress echo- cardiac w/u at [facility] ER- neg CT scan for PE, neg stress echo in 8/06     Chest pain, unspecified      Disorder of bone and cartilage, unspecified     osteopenia (had been on prempro), improved on 6/06 dexa, stable dexa 11/10     Diverticulosis of colon (without mention of hemorrhage)     last episode yrs ago     Essential hypertension, benign      Gastro-oesophageal  reflux disease      Insomnia, unspecified     weaned off clonazepam     Irregular heart beat      Lumbago 7/09    MRI with NEAL, now seeing Dr. Cain for sciatic sx's     Major depressive disorder, recurrent episode, moderate (H)      Obstructive sleep apnea      Osteoarthrosis, unspecified whether generalized or localized, unspecified site      Sjogren's syndrome (H)      Sleep apnea      Tobacco use disorder     chantix in 9/07, started again in 6/08, working       FAMILY HISTORY:  Mother had MI and CHF in her 60's. Sister had MI and CHF in her 60's  Family History   Problem Relation Age of Onset     C.A.D. Mother 63     MI- first at age 63     HEART DISEASE Mother      Hypertension Mother      CEREBROVASCULAR DISEASE Mother      Hyperlipidemia Mother      Alcohol/Drug Father      Alzheimer Disease Father      Dementia Father      Hypertension Father      Hyperlipidemia Father      C.A.D. Sister 52     Minor MI- age 50's     HEART DISEASE Sister      Hypertension Sister      Hypertension Sister      Hypertension Brother      Cancer - colorectal Sister 48     Late 40's early 50's     Prostate Cancer Brother 74     Dx'd age 74     GASTROINTESTINAL DISEASE Sister      Diverticulitis     GASTROINTESTINAL DISEASE Brother      Diverticulitis     Lipids Sister      Lipids Sister      Parkinsonism Brother      DIABETES Sister      HEART DISEASE Sister      CHF     CANCER Sister      lung, smoker     Substance Abuse Sister      Substance Abuse Brother      Asthma Sister      CANCER Sister      Breast Cancer Daughter      Prostate Cancer Brother      Hyperlipidemia Brother        SOCIAL HISTORY:  1/2 pack/yr for 25 yrs, quit 20 yrs ago, no etoh/drug use. Retired teacher    Social History     Social History     Marital Status: Single     Spouse Name: N/A     Number of Children: 0     Years of Education: Ed Spec De            Social History Main Topics     Smoking status: Former Smoker -- 0.50 packs/day for 10 years     Types:  Cigarettes     Quit date: 08/01/2011     Smokeless tobacco: Never Used      Comment: 1/2 ppd     Alcohol Use: No      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     Drug Use: No     Sexual Activity: No   CURRENT MEDICATIONS:    Prescription Medications as of 6/22/2017             predniSONE (DELTASONE) 1 MG tablet Take 1 tablet (1 mg) by mouth daily    traZODone (DESYREL) 50 MG tablet TAKE 1/2-1 TABLET BY MOUTH NIGHTLY AS NEEDED, CAN TITRATE DOWN TO 1/2TAB OR UP TO 2TABS AS NEEDED    PARoxetine (PAXIL) 20 MG tablet TAKE 1 TABLET (10mg) BY MOUTH AT BEDTIME    furosemide (LASIX) 40 MG tablet Take 0.5 tablets (20 mg) by mouth 2 times daily    ferrous sulfate (IRON) 325 (65 FE) MG tablet Take 1 tablet (325 mg) by mouth daily (with breakfast)    LANTUS SOLOSTAR 100 UNIT/ML soln INJECT 25 UNTIS SUBCUTANEOUSLY EVERY DAY    insulin aspart (NOVOLOG FLEXPEN) 100 UNIT/ML injection Inject 12 Units Subcutaneous 3 times daily (with meals)    lisinopril (PRINIVIL/ZESTRIL) 2.5 MG tablet Take 1 tablet (2.5 mg) by mouth daily    potassium chloride SA (K-DUR/KLOR-CON M) 20 MEQ CR tablet Take 1 tablet (20 mEq) by mouth daily    atorvastatin (LIPITOR) 40 MG tablet Take 1 tablet (40 mg) by mouth daily    lactobacillus rhamnosus, GG, (CULTURELL) capsule Take 1 capsule by mouth 3 times daily (before meals)    pantoprazole (PROTONIX) 40 MG EC tablet Take 1 tablet (40 mg) by mouth daily    blood glucose monitoring (NO BRAND SPECIFIED) test strip Use to test blood sugars 4 times daily or as directed    blood glucose monitoring (ACCU-CHEK MULTICLIX) lancets Use to test blood sugar 4 times daily or as directed.    insulin pen needle (B-D U/F) 31G X 8 MM Use 4 times daily (with Lantus and Novolog) or as directed.    insulin pen needle (BD JANE U/F) 32G X 4 MM Use 4 daily as directed.    blood glucose monitoring (ACCU-CHEK SHANNON PLUS) test strip Use to test blood sugar 4 times daily or as directed.  Ok to substitute alternative if insurance prefers.     blood glucose monitoring (ACCU-CHEK FASTCLIX) lancets Use to test blood sugar 4 times daily or as directed.  Ok to substitute alternative if insurance prefers.    predniSONE (DELTASONE) 10 MG tablet Take 2 tablets for three days (4/12-14), then take 1 tablet daily (10 mg daily starting 4/15)    metFORMIN (GLUCOPHAGE-XR) 500 MG 24 hr tablet Take 2 tablets (1,000 mg) by mouth daily (with dinner)    order for DME Oxygen: Patient requires supplemental Oxygen 4 LPM via nasal canula with activity. Please provide patient with POC to improve mobility, okay to titrate for conserving to keep stats above 90%. Please fax results to [phone].  Oxygen will be for a lifetime.    pantoprazole (PROTONIX) 20 MG EC tablet Take 1-2 tablets (20-40 mg) by mouth daily    ketoconazole (NIZORAL) 2 % cream Apply topically 2 times daily    metoprolol (TOPROL-XL) 100 MG 24 hr tablet Take 1 tablet (100 mg) by mouth daily    spironolactone (ALDACTONE) 25 MG tablet Take 1 tablet (25 mg) by mouth daily    montelukast (SINGULAIR) 10 MG tablet Take 1 tablet (10 mg) by mouth At Bedtime    albuterol (PROAIR HFA, PROVENTIL HFA, VENTOLIN HFA) 108 (90 BASE) MCG/ACT inhaler Inhale 2 puffs into the lungs every 6 hours    order for DME Oxygen: Patient requires supplemental Oxygen 4 LPM via nasal canula with activity. Please provide patient with a home unit and with portability capability. Oxygen will be for a lifetime.    polyethylene glycol (MIRALAX/GLYCOLAX) packet Take 17 g by mouth daily as needed for constipation    warfarin (COUMADIN) 7.5 MG tablet Current dose is 7.5 mg daily.  Dose adjusted per INR result.    acyclovir (ZOVIRAX) 5 % ointment Apply topically 6 times daily    fluticasone (FLOVENT HFA) 220 MCG/ACT inhaler Inhale 2 puffs into the lungs 2 times daily    acyclovir (ZOVIRAX) 400 MG tablet Take 400 mg by mouth See Admin Instructions 5 times daily as needed for outbreaks    fluticasone (FLONASE) 50 MCG/ACT nasal spray Spray 1-2 sprays  "into both nostrils daily    COMPRESSION STOCKINGS Wear compression stockings in affected leg (right leg) or both legs most of the time during the day and take them off at night.    acetaminophen (TYLENOL) 500 MG tablet Take 500 mg by mouth nightly as needed         ROS:   Constitutional: No fever, chills, or sweats. No weight gain/loss.   ENT: No visual disturbance, ear ache, epistaxis, sore throat.   Allergies/Immunologic: Negative.   Respiratory: No cough, hemoptysis.   Cardiovascular: As per HPI.   GI: No nausea, vomiting, hematemesis, melena, or hematochezia.   : No urinary frequency, dysuria, or hematuria.   Integument: Negative.   Psychiatric: Negative.   Neuro: Negative.   Endocrinology: Negative.   Musculoskeletal: Negative.    EXAM:  /74 (BP Location: Left arm, Patient Position: Chair)  Pulse 58  Ht 1.676 m (5' 6\")  Wt 91.2 kg (201 lb)  SpO2 97%  BMI 32.44 kg/m2  General: appears comfortable, alert and articulate  Head: normocephalic, atraumatic  Eyes: anicteric sclera, EOMI  Neck: no adenopathy  Orophyarynx: moist mucosa, no lesions, dentition intact  Heart: Irregular, S1/S2, no murmur, gallop, rub, estimated JVP 14 cm  Lungs: clear, no rales or wheezing  Abdomen: soft, non-tender, bowel sounds present, no hepatosplenomegaly  Extremities: 1-2mm pitting edema from feet to proximal legs bilaterally, no clubbing, cyanosis.  Neurological: normal speech and affect, no gross motor deficits    Labs:  CBC RESULTS:  Lab Results   Component Value Date    WBC 13.8 (H) 05/31/2017    RBC 4.45 05/31/2017    HGB 9.4 (L) 05/31/2017    HCT 33.0 (L) 05/31/2017    MCV 74 (L) 05/31/2017    MCH 21.1 (L) 05/31/2017    MCHC 28.5 (L) 05/31/2017    RDW 21.4 (H) 05/31/2017     05/31/2017       CMP RESULTS:  Lab Results   Component Value Date     04/28/2017    POTASSIUM 4.2 04/28/2017    CHLORIDE 105 04/28/2017    CO2 25 04/28/2017    ANIONGAP 10 04/28/2017     (H) 04/28/2017    BUN 15 04/28/2017    " CR 0.82 04/28/2017    GFRESTIMATED 68 04/28/2017    GFRESTBLACK 82 04/28/2017    CLAUDY 8.8 04/28/2017    BILITOTAL 0.6 04/02/2017    ALBUMIN 3.0 (L) 04/02/2017    ALKPHOS 72 04/02/2017    ALT 19 04/02/2017    AST 29 04/02/2017        INR RESULTS:  Lab Results   Component Value Date    INR 1.9 (A) 05/31/2017    INR 2.05 (H) 04/11/2017       Lab Results   Component Value Date    MAG 2.0 04/11/2017     Lab Results   Component Value Date    NTBNPI 3531 (H) 04/06/2017     Lab Results   Component Value Date    NTBNP 755 (H) 11/03/2016     HgA1c 7% Sept 2016    ECG :10/26/16 atrial fibrillation HR 72    48hr holter July 2016- generally controlled atrial fibrillation    July 2016 Complete Portable Echo Adult. Contrast Optison. Optison (NDC #7282-7174-47)  given intravenously. Patient was given 4 ml mixture of 3 ml Optison and 6 ml  saline. 5 ml wasted. Interpretation Summary  Atrial fibrillation  Left ventricular function, chamber size, wall motion, and wall thickness are  normal.The EF is 60-65%. Normal LV size and wall thickness, mild bilateral atrial enlargement  PA pressure cannot be determined  Estimated RA pressure 8 mmHg    Regadenosen MPI 2014: no fixed or inducible defects    Assessment and Plan:  In summary this is a very pleasant 76 year old female with suspected HFpEF who is referred today for further evaluation and treatment.     In support of the diagnosis of HFpEF includes mild LA enlargement, echocardiographic signs of increase LV filling pressures, increased nt-bnp, as well as a diuretic requirement and symptomatic improvement on diuretics.    The risk factors for HFpEF in her include age, gender, HTN, pre-diabetes, sleep apnea and obesity.  A prior stress test was negative for CAD. She is presently hypervolemic on exam and is NYHA class III. I am increasing her diuretics as listed below.     The mainstays of therapy for HFpEF include volume management, blood pressure control, treating underlying sleep apnea  if present, weight management, exercise training, rate control for atrial fibrillation as well as consideration for a rhythm control strategy, as well as consideration for clinical trials.  I do have her on spironolactone given the results of the TOPCAT trial. Patients have symptomatically felt very improved on this medication. Her a fib rates have been under better control recently and her ischemia evaluation is negative.     Summary of today's plan:   Increase Lasix (Furosemide) 40 mg BID x 3 days l  On Monday, increase lasix to 40 am/20 pm   Tonight take 60 mEq  Potassium,  3 pills  In AM increase Potassium 40 mg daily  Increase Spironolactone 50 mg (two 25 mg pills) daily  BMP next week       Other:    Af fib:- atrial fibrillation [id number] (age >75, HTN, CHF, gender)permanent  poorly controlled rate on exam increasing her metoprolol to 100 mg XL daily - INR goal 2-3  [id number] score of 5 warrants anticoagulation     We look forward to seeing Betty Tee in back in 4months for follow up up.She will see CORE in 2 months         Radha Rosa  [facility]      Director,  [facility] HFpEF Program       CC  Patient Care Team:  Ilene Tristan MD as PCP - General  [facility], Becky Laureano MD as Resident  [facility] as [facility] (Rheumatology)  William Azevedo MD as MD (Internal Medicine)  Kirill Polk MD as MD (Otolaryngology)  Aubrey Hurtado MD as MD (Cardiology)  Jasvir Franco MD as Resident (Internal Medicine)  Danay Payne, RN as Nurse Coordinator (Clinical Cardiac Electrophysiology)  Juana Nunn RN as Registered Nurse (Cardiology)  Anderson Perez MD as MD (Clinical Cardiac Electrophysiology)  Martha Gardiner, RN as Nurse Coordinator (Cardiology)  Radha oRsa MD as MD (Cardiology)  NORRIS MEEHAN

## 2017-06-22 NOTE — PROGRESS NOTES
CC:  Follow up HFpEF    HPI:   75 yo F with interstitial lung disease (possible moderate restriction), Sjogren's syndrome, HTN, permanent atrial fibrillation, diverticulitis s/p colectomy 2011 who was referred to us by EP clinic to establish care for HFpEF. I have seen her one time previous to this.     In July she was hospitalized for lower GI bleed and Afib,  Echo showed normal EF.    She started having dyspnea with activity this summer. Her symptoms improved after we increased her diuretics. Presently, she can walk 1 block or climb 1 flight before becoming SOB. She has orthopnea and no PND. Her leg swelling is improved from the last we saw her and is now minimal.  Her lightheadedness is improved from the last I saw her.     She is now on lasix dose 40mg AM and 40mg PM.     Uses 4L oxygen with exertional activities like grocery shopping, climbing stairs etc. Doesn't use it at rest or at night.     PAST MEDICAL HISTORY:  Past Medical History:   Diagnosis Date     Alcohol abuse, in remission      Allergic rhinitis, cause unspecified     allegra helps when she takes it     Antiplatelet or antithrombotic long-term use      Atrial fibrillation (H)     in hosp in 11/11 after surgery w/ fluid overload     Cardiomegaly     LVH on stress echo- cardiac w/u at [facility] ER- neg CT scan for PE, neg stress echo in 8/06     Chest pain, unspecified      Disorder of bone and cartilage, unspecified     osteopenia (had been on prempro), improved on 6/06 dexa, stable dexa 11/10     Diverticulosis of colon (without mention of hemorrhage)     last episode yrs ago     Essential hypertension, benign      Gastro-oesophageal reflux disease      Insomnia, unspecified     weaned off clonazepam     Irregular heart beat      Lumbago 7/09    MRI with NEAL, now seeing Dr. Cain for sciatic sx's     Major depressive disorder, recurrent episode, moderate (H)      Obstructive sleep apnea      Osteoarthrosis, unspecified whether generalized or  localized, unspecified site      Sjogren's syndrome (H)      Sleep apnea      Tobacco use disorder     chantix in 9/07, started again in 6/08, working       FAMILY HISTORY:  Mother had MI and CHF in her 60's. Sister had MI and CHF in her 60's  Family History   Problem Relation Age of Onset     C.A.D. Mother 63     MI- first at age 63     HEART DISEASE Mother      Hypertension Mother      CEREBROVASCULAR DISEASE Mother      Hyperlipidemia Mother      Alcohol/Drug Father      Alzheimer Disease Father      Dementia Father      Hypertension Father      Hyperlipidemia Father      C.A.D. Sister 52     Minor MI- age 50's     HEART DISEASE Sister      Hypertension Sister      Hypertension Sister      Hypertension Brother      Cancer - colorectal Sister 48     Late 40's early 50's     Prostate Cancer Brother 74     Dx'd age 74     GASTROINTESTINAL DISEASE Sister      Diverticulitis     GASTROINTESTINAL DISEASE Brother      Diverticulitis     Lipids Sister      Lipids Sister      Parkinsonism Brother      DIABETES Sister      HEART DISEASE Sister      CHF     CANCER Sister      lung, smoker     Substance Abuse Sister      Substance Abuse Brother      Asthma Sister      CANCER Sister      Breast Cancer Daughter      Prostate Cancer Brother      Hyperlipidemia Brother        SOCIAL HISTORY:  1/2 pack/yr for 25 yrs, quit 20 yrs ago, no etoh/drug use. Retired teacher    Social History     Social History     Marital Status: Single     Spouse Name: N/A     Number of Children: 0     Years of Education: Ed Spec De            Social History Main Topics     Smoking status: Former Smoker -- 0.50 packs/day for 10 years     Types: Cigarettes     Quit date: 08/01/2011     Smokeless tobacco: Never Used      Comment: 1/2 ppd     Alcohol Use: No      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     Drug Use: No     Sexual Activity: No   CURRENT MEDICATIONS:    Prescription Medications as of 6/22/2017             predniSONE (DELTASONE) 1 MG tablet  Take 1 tablet (1 mg) by mouth daily    traZODone (DESYREL) 50 MG tablet TAKE 1/2-1 TABLET BY MOUTH NIGHTLY AS NEEDED, CAN TITRATE DOWN TO 1/2TAB OR UP TO 2TABS AS NEEDED    PARoxetine (PAXIL) 20 MG tablet TAKE 1 TABLET (10mg) BY MOUTH AT BEDTIME    furosemide (LASIX) 40 MG tablet Take 0.5 tablets (20 mg) by mouth 2 times daily    ferrous sulfate (IRON) 325 (65 FE) MG tablet Take 1 tablet (325 mg) by mouth daily (with breakfast)    LANTUS SOLOSTAR 100 UNIT/ML soln INJECT 25 UNTIS SUBCUTANEOUSLY EVERY DAY    insulin aspart (NOVOLOG FLEXPEN) 100 UNIT/ML injection Inject 12 Units Subcutaneous 3 times daily (with meals)    lisinopril (PRINIVIL/ZESTRIL) 2.5 MG tablet Take 1 tablet (2.5 mg) by mouth daily    potassium chloride SA (K-DUR/KLOR-CON M) 20 MEQ CR tablet Take 1 tablet (20 mEq) by mouth daily    atorvastatin (LIPITOR) 40 MG tablet Take 1 tablet (40 mg) by mouth daily    lactobacillus rhamnosus, GG, (CULTURELL) capsule Take 1 capsule by mouth 3 times daily (before meals)    pantoprazole (PROTONIX) 40 MG EC tablet Take 1 tablet (40 mg) by mouth daily    blood glucose monitoring (NO BRAND SPECIFIED) test strip Use to test blood sugars 4 times daily or as directed    blood glucose monitoring (ACCU-CHEK MULTICLIX) lancets Use to test blood sugar 4 times daily or as directed.    insulin pen needle (B-D U/F) 31G X 8 MM Use 4 times daily (with Lantus and Novolog) or as directed.    insulin pen needle (BD JANE U/F) 32G X 4 MM Use 4 daily as directed.    blood glucose monitoring (ACCU-CHEK SHANNON PLUS) test strip Use to test blood sugar 4 times daily or as directed.  Ok to substitute alternative if insurance prefers.    blood glucose monitoring (ACCU-CHEK FASTCLIX) lancets Use to test blood sugar 4 times daily or as directed.  Ok to substitute alternative if insurance prefers.    predniSONE (DELTASONE) 10 MG tablet Take 2 tablets for three days (4/12-14), then take 1 tablet daily (10 mg daily starting 4/15)    metFORMIN  (GLUCOPHAGE-XR) 500 MG 24 hr tablet Take 2 tablets (1,000 mg) by mouth daily (with dinner)    order for DME Oxygen: Patient requires supplemental Oxygen 4 LPM via nasal canula with activity. Please provide patient with POC to improve mobility, okay to titrate for conserving to keep stats above 90%. Please fax results to [phone].  Oxygen will be for a lifetime.    pantoprazole (PROTONIX) 20 MG EC tablet Take 1-2 tablets (20-40 mg) by mouth daily    ketoconazole (NIZORAL) 2 % cream Apply topically 2 times daily    metoprolol (TOPROL-XL) 100 MG 24 hr tablet Take 1 tablet (100 mg) by mouth daily    spironolactone (ALDACTONE) 25 MG tablet Take 1 tablet (25 mg) by mouth daily    montelukast (SINGULAIR) 10 MG tablet Take 1 tablet (10 mg) by mouth At Bedtime    albuterol (PROAIR HFA, PROVENTIL HFA, VENTOLIN HFA) 108 (90 BASE) MCG/ACT inhaler Inhale 2 puffs into the lungs every 6 hours    order for DME Oxygen: Patient requires supplemental Oxygen 4 LPM via nasal canula with activity. Please provide patient with a home unit and with portability capability. Oxygen will be for a lifetime.    polyethylene glycol (MIRALAX/GLYCOLAX) packet Take 17 g by mouth daily as needed for constipation    warfarin (COUMADIN) 7.5 MG tablet Current dose is 7.5 mg daily.  Dose adjusted per INR result.    acyclovir (ZOVIRAX) 5 % ointment Apply topically 6 times daily    fluticasone (FLOVENT HFA) 220 MCG/ACT inhaler Inhale 2 puffs into the lungs 2 times daily    acyclovir (ZOVIRAX) 400 MG tablet Take 400 mg by mouth See Admin Instructions 5 times daily as needed for outbreaks    fluticasone (FLONASE) 50 MCG/ACT nasal spray Spray 1-2 sprays into both nostrils daily    COMPRESSION STOCKINGS Wear compression stockings in affected leg (right leg) or both legs most of the time during the day and take them off at night.    acetaminophen (TYLENOL) 500 MG tablet Take 500 mg by mouth nightly as needed         ROS:   Constitutional: No fever, chills,  "or sweats. No weight gain/loss.   ENT: No visual disturbance, ear ache, epistaxis, sore throat.   Allergies/Immunologic: Negative.   Respiratory: No cough, hemoptysis.   Cardiovascular: As per HPI.   GI: No nausea, vomiting, hematemesis, melena, or hematochezia.   : No urinary frequency, dysuria, or hematuria.   Integument: Negative.   Psychiatric: Negative.   Neuro: Negative.   Endocrinology: Negative.   Musculoskeletal: Negative.    EXAM:  /74 (BP Location: Left arm, Patient Position: Chair)  Pulse 58  Ht 1.676 m (5' 6\")  Wt 91.2 kg (201 lb)  SpO2 97%  BMI 32.44 kg/m2  General: appears comfortable, alert and articulate  Head: normocephalic, atraumatic  Eyes: anicteric sclera, EOMI  Neck: no adenopathy  Orophyarynx: moist mucosa, no lesions, dentition intact  Heart: Irregular, S1/S2, no murmur, gallop, rub, estimated JVP 14 cm  Lungs: clear, no rales or wheezing  Abdomen: soft, non-tender, bowel sounds present, no hepatosplenomegaly  Extremities: 1-2mm pitting edema from feet to proximal legs bilaterally, no clubbing, cyanosis.  Neurological: normal speech and affect, no gross motor deficits    Labs:  CBC RESULTS:  Lab Results   Component Value Date    WBC 13.8 (H) 05/31/2017    RBC 4.45 05/31/2017    HGB 9.4 (L) 05/31/2017    HCT 33.0 (L) 05/31/2017    MCV 74 (L) 05/31/2017    MCH 21.1 (L) 05/31/2017    MCHC 28.5 (L) 05/31/2017    RDW 21.4 (H) 05/31/2017     05/31/2017       CMP RESULTS:  Lab Results   Component Value Date     04/28/2017    POTASSIUM 4.2 04/28/2017    CHLORIDE 105 04/28/2017    CO2 25 04/28/2017    ANIONGAP 10 04/28/2017     (H) 04/28/2017    BUN 15 04/28/2017    CR 0.82 04/28/2017    GFRESTIMATED 68 04/28/2017    GFRESTBLACK 82 04/28/2017    CLAUDY 8.8 04/28/2017    BILITOTAL 0.6 04/02/2017    ALBUMIN 3.0 (L) 04/02/2017    ALKPHOS 72 04/02/2017    ALT 19 04/02/2017    AST 29 04/02/2017        INR RESULTS:  Lab Results   Component Value Date    INR 1.9 (A) 05/31/2017 "    INR 2.05 (H) 04/11/2017       Lab Results   Component Value Date    MAG 2.0 04/11/2017     Lab Results   Component Value Date    NTBNPI 3531 (H) 04/06/2017     Lab Results   Component Value Date    NTBNP 755 (H) 11/03/2016     HgA1c 7% Sept 2016    ECG :10/26/16 atrial fibrillation HR 72    48hr holter July 2016- generally controlled atrial fibrillation    July 2016 Complete Portable Echo Adult. Contrast Optison. Optison (NDC #1393-1409-07)  given intravenously. Patient was given 4 ml mixture of 3 ml Optison and 6 ml  saline. 5 ml wasted. Interpretation Summary  Atrial fibrillation  Left ventricular function, chamber size, wall motion, and wall thickness are  normal.The EF is 60-65%. Normal LV size and wall thickness, mild bilateral atrial enlargement  PA pressure cannot be determined  Estimated RA pressure 8 mmHg    Regadenosen MPI 2014: no fixed or inducible defects    Assessment and Plan:  In summary this is a very pleasant 76 year old female with suspected HFpEF who is referred today for further evaluation and treatment.     In support of the diagnosis of HFpEF includes mild LA enlargement, echocardiographic signs of increase LV filling pressures, increased nt-bnp, as well as a diuretic requirement and symptomatic improvement on diuretics.    The risk factors for HFpEF in her include age, gender, HTN, pre-diabetes, sleep apnea and obesity.  A prior stress test was negative for CAD. She is presently hypervolemic on exam and is NYHA class III. I am increasing her diuretics as listed below.     The mainstays of therapy for HFpEF include volume management, blood pressure control, treating underlying sleep apnea if present, weight management, exercise training, rate control for atrial fibrillation as well as consideration for a rhythm control strategy, as well as consideration for clinical trials.  I do have her on spironolactone given the results of the TOPCAT trial. Patients have symptomatically felt very  improved on this medication. Her a fib rates have been under better control recently and her ischemia evaluation is negative.     Summary of today's plan:   Increase Lasix (Furosemide) 40 mg BID x 3 days l  On Monday, increase lasix to 40 am/20 pm   Tonight take 60 mEq  Potassium,  3 pills  In AM increase Potassium 40 mg daily  Increase Spironolactone 50 mg (two 25 mg pills) daily  BMP next week       Other:    Af fib:- atrial fibrillation [id number] (age >75, HTN, CHF, gender)permanent  poorly controlled rate on exam increasing her metoprolol to 100 mg XL daily - INR goal 2-3  [id number] score of 5 warrants anticoagulation     We look forward to seeing Betty Tee in back in 4months for follow up up.She will see CORE in 2 months         Radha Rosa  [facility]      Director,  [facility] HFpEF Program       CC  Patient Care Team:  Ilene Tristan MD as PCP - General  [facility], Becky Laureano MD as Resident  Jose A Navarrete MD as MD (Rheumatology)  William Azevedo MD as MD (Internal Medicine)  Kirill Polk MD as MD (Otolaryngology)  Aubrey Hurtado MD as MD (Cardiology)  Jasvir Franco MD as Resident (Internal Medicine)  Danay Payne, RN as Nurse Coordinator (Clinical Cardiac Electrophysiology)  Juana Nunn RN as Registered Nurse (Cardiology)  Anderson Perez MD as MD (Clinical Cardiac Electrophysiology)  Martha Gardiner, RN as Nurse Coordinator (Cardiology)  Radha Rosa MD as MD (Cardiology)  NORRIS MEEHAN

## 2017-06-22 NOTE — PATIENT INSTRUCTIONS
- DEXA scan today  - Expect a call regarding your vitamin D levels  - Read about Rituximab, possible option if your symptoms come back when you decrease prednisone.    - lease call if you have joint pain once you start lowering prednisone.

## 2017-06-22 NOTE — PATIENT INSTRUCTIONS
Increase Lasix (Furosemide) 40 mg twice daily for Fri, Sat, Sun.  Water pill    On Monday, take Lasix 40 mg in AM, 20 mg in afternoon    Tonight take 60 mEq  Potassium,  3 pills    In AM increase Potassium 40 mg daily    Increase Spironolactone 50 mg (two 25 mg pills) daily    Labs next Thurs. 6/29 and on July 5th    Follow up CORE in 2 months,  Follow up Dr. Rosa in 4 months.      Sherrell Ramírez, RN  Cardiology Care Coordinator  Please be aware that I work part-time but I will be checking messages several times per week.   For urgent needs, please call the number below.    [phone], press 1 for [employer], press 3 to speak to a nurse    .

## 2017-06-22 NOTE — LETTER
6/22/2017       RE: Betty Tee  [address]  [facility] [address]     Dear Colleague,    Thank you for referring your patient, Betty Tee, to the [facility] RHEUMATOLOGY at [facility]. Please see a copy of my visit note below.    Betty is a 77 year old female presents today for follow up on Sjogren's Syndrome with ILD. She was a patient of Dr Navarrete's who is transferring care.    #1 Sjogren syndrome with borderline positive lip biopsy, low titer RG and low titer SSA antibody, sicca symptoms, ILD with reticular opacities in lungs, paratrachial lymphadenopathy dx 2015  #2 Right submandibular gland swelling FNA negative for lymphoma  #3 Elevated IgG4  #4 Episodic prednisone responsive arthropathy   #5 Moderate osteopenia and chronic steroid use     Subjective: Pt feels pretty good today.  She says that since she had a burst of steroids in April she doesn't have joint pain and is breathing a little easier.  She was hospitalized from 4/2-4/10/17 with pneumonia, ILD exacerbation, new onset diabetes, and CHF. She was started on a burst of steroids in the ICU due to respiratory failure and her symptoms improved. She is here today to consult about other treatment options now that she is tapering off of the prednisone. She currently takes 10mg per day.     She has a history of long term prednisone use currently on 10mg per day. She has a history of moderate osteopenia 11/2015.  She is not currently on fosamax and it is not clear if she has been on it in the past.     HPI  Pt was diagnosed with Primary Sjogren Syndrome in 2015 due to borderline +RG, +SSA, and lip biopsy focus score of 1.  Her ILD and joint pain are prednisone-responsive which support the diagnosis. Ocular staining score was deferred given the other sources of diagnosis.      Past Medical History  Past Medical History:   Diagnosis Date     Alcohol abuse, in remission      Allergic rhinitis,  cause unspecified     allegra helps when she takes it     Antiplatelet or antithrombotic long-term use      Atrial fibrillation (H)     in hosp in 11/11 after surgery w/ fluid overload     Cardiomegaly     LVH on stress echo- cardiac w/u at [facility] ER- neg CT scan for PE, neg stress echo in 8/06     Chest pain, unspecified      Disorder of bone and cartilage, unspecified     osteopenia (had been on prempro), improved on 6/06 dexa, stable dexa 11/10     Diverticulosis of colon (without mention of hemorrhage)     last episode yrs ago     Essential hypertension, benign      Gastro-oesophageal reflux disease      Insomnia, unspecified     weaned off clonazepam     Irregular heart beat      Lumbago 7/09    MRI with DJD, now seeing Dr. Cain for sciatic sx's     Major depressive disorder, recurrent episode, moderate (H)      Obstructive sleep apnea      Osteoarthrosis, unspecified whether generalized or localized, unspecified site      Sjogren's syndrome (H)      Sleep apnea      Tobacco use disorder     chantix in 9/07, started again in 6/08, working       Allergies  Allergies   Allergen Reactions     Augmentin Nausea and Vomiting     Codeine Nausea and Vomiting     Phenobarbital Itching     Medications  Current Outpatient Prescriptions   Medication Sig Dispense Refill     predniSONE (DELTASONE) 1 MG tablet Take 1 tablet (1 mg) by mouth daily 30 tablet 3     traZODone (DESYREL) 50 MG tablet TAKE 1/2-1 TABLET BY MOUTH NIGHTLY AS NEEDED, CAN TITRATE DOWN TO 1/2TAB OR UP TO 2TABS AS NEEDED 90 tablet 1     PARoxetine (PAXIL) 20 MG tablet TAKE 1 TABLET (10mg) BY MOUTH AT BEDTIME 20 tablet 3     furosemide (LASIX) 40 MG tablet Take 0.5 tablets (20 mg) by mouth 2 times daily 180 tablet 3     ferrous sulfate (IRON) 325 (65 FE) MG tablet Take 1 tablet (325 mg) by mouth daily (with breakfast) 30 tablet 3     LANTUS SOLOSTAR 100 UNIT/ML soln INJECT 25 UNTIS SUBCUTANEOUSLY EVERY DAY 15 mL 1     insulin aspart (NOVOLOG FLEXPEN) 100  UNIT/ML injection Inject 12 Units Subcutaneous 3 times daily (with meals) 45 mL 0     lisinopril (PRINIVIL/ZESTRIL) 2.5 MG tablet Take 1 tablet (2.5 mg) by mouth daily 90 tablet 1     potassium chloride SA (K-DUR/KLOR-CON M) 20 MEQ CR tablet Take 1 tablet (20 mEq) by mouth daily 90 tablet 0     atorvastatin (LIPITOR) 40 MG tablet Take 1 tablet (40 mg) by mouth daily 90 tablet 1     lactobacillus rhamnosus, GG, (CULTURELL) capsule Take 1 capsule by mouth 3 times daily (before meals) 90 capsule 1     pantoprazole (PROTONIX) 40 MG EC tablet Take 1 tablet (40 mg) by mouth daily 90 tablet 1     blood glucose monitoring (NO BRAND SPECIFIED) test strip Use to test blood sugars 4 times daily or as directed 300 strip 3     blood glucose monitoring (ACCU-CHEK MULTICLIX) lancets Use to test blood sugar 4 times daily or as directed. 4 Box 3     insulin pen needle (B-D U/F) 31G X 8 MM Use 4 times daily (with Lantus and Novolog) or as directed. 400 each 3     insulin pen needle (BD JANE U/F) 32G X 4 MM Use 4 daily as directed. 200 each 11     blood glucose monitoring (ACCU-CHEK SHANNON PLUS) test strip Use to test blood sugar 4 times daily or as directed.  Ok to substitute alternative if insurance prefers. 120 strip 11     blood glucose monitoring (ACCU-CHEK FASTCLIX) lancets Use to test blood sugar 4 times daily or as directed.  Ok to substitute alternative if insurance prefers. 1 Box 11     predniSONE (DELTASONE) 10 MG tablet Take 2 tablets for three days (4/12-14), then take 1 tablet daily (10 mg daily starting 4/15) 90 tablet 3     metFORMIN (GLUCOPHAGE-XR) 500 MG 24 hr tablet Take 2 tablets (1,000 mg) by mouth daily (with dinner) 180 tablet 0     order for DME Oxygen: Patient requires supplemental Oxygen 4 LPM via nasal canula with activity. Please provide patient with POC to improve mobility, okay to titrate for conserving to keep stats above 90%. Please fax results to [phone].  Oxygen will be for a lifetime. 1 Device 0      pantoprazole (PROTONIX) 20 MG EC tablet Take 1-2 tablets (20-40 mg) by mouth daily 60 tablet 1     ketoconazole (NIZORAL) 2 % cream Apply topically 2 times daily 60 g 1     metoprolol (TOPROL-XL) 100 MG 24 hr tablet Take 1 tablet (100 mg) by mouth daily 90 tablet 3     spironolactone (ALDACTONE) 25 MG tablet Take 1 tablet (25 mg) by mouth daily 90 tablet 3     montelukast (SINGULAIR) 10 MG tablet Take 1 tablet (10 mg) by mouth At Bedtime 90 tablet 1     albuterol (PROAIR HFA, PROVENTIL HFA, VENTOLIN HFA) 108 (90 BASE) MCG/ACT inhaler Inhale 2 puffs into the lungs every 6 hours 1 Inhaler 3     order for DME Oxygen: Patient requires supplemental Oxygen 4 LPM via nasal canula with activity. Please provide patient with a home unit and with portability capability. Oxygen will be for a lifetime. 1 Device 0     polyethylene glycol (MIRALAX/GLYCOLAX) packet Take 17 g by mouth daily as needed for constipation 7 packet 0     warfarin (COUMADIN) 7.5 MG tablet Current dose is 7.5 mg daily.  Dose adjusted per INR result. 100 tablet 3     acyclovir (ZOVIRAX) 5 % ointment Apply topically 6 times daily (Patient taking differently: Apply topically 6 times daily As needed for outbreaks) 15 g 3     fluticasone (FLOVENT HFA) 220 MCG/ACT inhaler Inhale 2 puffs into the lungs 2 times daily 3 Inhaler 3     acyclovir (ZOVIRAX) 400 MG tablet Take 400 mg by mouth See Admin Instructions 5 times daily as needed for outbreaks       fluticasone (FLONASE) 50 MCG/ACT nasal spray Spray 1-2 sprays into both nostrils daily (Patient taking differently: Spray 1-2 sprays into both nostrils daily as needed for allergies ) 16 g 5     COMPRESSION STOCKINGS Wear compression stockings in affected leg (right leg) or both legs most of the time during the day and take them off at night. 2 each 2     acetaminophen (TYLENOL) 500 MG tablet Take 500 mg by mouth nightly as needed        Family History  family history includes Alcohol/Drug in her father;  "Alzheimer Disease in her father; Asthma in her sister; Breast Cancer in her daughter; C.A.D. (age of onset: 52) in her sister; C.A.D. (age of onset: 63) in her mother; CANCER in her sister and sister; CEREBROVASCULAR DISEASE in her mother; Cancer - colorectal (age of onset: 48) in her sister; DIABETES in her sister; Dementia in her father; GASTROINTESTINAL DISEASE in her brother and sister; HEART DISEASE in her mother, sister, and sister; Hyperlipidemia in her brother, father, and mother; Hypertension in her brother, father, mother, sister, and sister; Lipids in her sister and sister; Parkinsonism in her brother; Prostate Cancer in her brother; Prostate Cancer (age of onset: 74) in her brother; Substance Abuse in her brother and sister.  Social History  Social History   Substance Use Topics     Smoking status: Former Smoker     Packs/day: 0.50     Years: 10.00     Types: Cigarettes     Quit date: 8/1/2011     Smokeless tobacco: Never Used      Comment: 1/2 ppd     Alcohol use No      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       ROS  Skin: negative  Eyes: negative  Ears/Nose/Throat: negative  Respiratory: did well on 6 minute walk test.  Feels like she is using less oxygen at home and with activity  Endocrine: negative  Cardiovascular: well controlled a fib and chf   Gastrointestinal: negative  Genitourinary: negative  Musculoskeletal: negative  Neurologic: negative  Psychiatric: negative  Rheumatology: no morning stiffness    Physical Exam  /74  Pulse 58  Temp 98  F (36.7  C) (Oral)  Ht 1.689 m (5' 6.5\")  Wt 91.2 kg (201 lb)  BMI 31.96 kg/m2  Body mass index is 31.96 kg/(m^2).    Physical Exam   Constitutional: She is oriented to person, place, and time. She appears well-developed and well-nourished.   HENT:   Head: Normocephalic.   Eyes: Conjunctivae are normal.   Neck: Normal range of motion.   Cardiovascular:   No murmur heard.  Irregularly irregular, non-tachycardic    Pulmonary/Chest: Effort normal " and breath sounds normal. No respiratory distress.   Musculoskeletal: Normal range of motion. She exhibits no edema.   Neurological: She is alert and oriented to person, place, and time.   Skin: No rash noted.   Dry elbows bilaterally    Psychiatric: She has a normal mood and affect. Her behavior is normal. Judgment and thought content normal.   Vitals reviewed.            RESULTS  Lab Results   Component Value Date    HGB 9.4 05/31/2017     Lab Results   Component Value Date    TSH 3.12 09/24/2004              Lab Results   Component Value Date    LDL 56 09/20/2016        No results found for: MICROALBUMIN  Lab Results   Component Value Date    ALBUMIN 3.0 04/02/2017           Lab Results   Component Value Date    ALT 19 04/02/2017               No results found for: CREATININE          ASSESSMENT and PLAN    1. Sjogren's Syndrome with ILD.  Pt is going rather well.  She had a burst of prednisone when she was hospitalized for CHF, ILD and pneunmonia and is now on 10mg of prednisone a day. She has been relatively symptom free since then.  She did well on a 6 minute walk test. Her eyes and mouth dryness are well managed. Will decrease prednisone 1mg every 2 weeks until off or at least below 5mg a day.  Can consider periodic bursts in the future and possibly biologic therapy if condition worsens.      We discussed the possibility of starting Rituximab, which could be helpful in Sjogren's ILD and can decrease reliance on long-term steroids. However, I would only consider Rituximab if there's ILD progression. Otherwise, long-term low-dose prednisone is reasonable given patient's age and comorbidities.    2. High risk of osteoporosis in a setting of long-term prednisone use, no hx of fracture. dexa in 2015 wnl.   Start vitamin D-Ca supplementation, get repeat DEXA. Has not been on bisphosphonate therapy in the past and will likely benefit - will decide based on DEXA results.    RTC in 3 months    Pt seen and examined  with Dr Stringer.     DO [facility] Resident      I was present with resident during key aspects of history and physical examination, was directly involved in medical decision making and management of this patient, and I agree with the resident s documentation, findings, and plan. I edited the assessment and plan to reflect my recommendations.    I discussed the findings and recommendations with the patient.  Recommendations were discussed with the consulting physician.  Time spent: 60 minutes    Carmenza Stringer MD, MS  Rheumatology Attending Physician  pgr [phone]

## 2017-06-23 ENCOUNTER — TELEPHONE (OUTPATIENT)
Dept: FAMILY MEDICINE | Facility: CLINIC | Age: 77
End: 2017-06-23
Payer: MEDICARE

## 2017-06-23 ENCOUNTER — HOSPITAL ENCOUNTER (EMERGENCY)
Facility: CLINIC | Age: 77
Discharge: HOME | End: 2017-06-24
Attending: EMERGENCY MEDICINE | Admitting: EMERGENCY MEDICINE
Payer: MEDICARE

## 2017-06-23 ENCOUNTER — NURSE TRIAGE (OUTPATIENT)
Dept: NURSING | Facility: CLINIC | Age: 77
End: 2017-06-23

## 2017-06-23 DIAGNOSIS — Z79.4 TYPE 2 DIABETES MELLITUS WITH HYPERGLYCEMIA, WITH LONG-TERM CURRENT USE OF INSULIN (H): ICD-10-CM

## 2017-06-23 DIAGNOSIS — I48.91 ATRIAL FIBRILLATION WITH CONTROLLED VENTRICULAR RESPONSE (H): ICD-10-CM

## 2017-06-23 DIAGNOSIS — R73.9 HYPERGLYCEMIA: ICD-10-CM

## 2017-06-23 DIAGNOSIS — Z79.4 ENCOUNTER FOR LONG-TERM (CURRENT) USE OF INSULIN (H): ICD-10-CM

## 2017-06-23 DIAGNOSIS — Z79.01 LONG TERM (CURRENT) USE OF ANTICOAGULANTS: ICD-10-CM

## 2017-06-23 DIAGNOSIS — M35.02 SJOGREN'S SYNDROME WITH LUNG INVOLVEMENT (H): ICD-10-CM

## 2017-06-23 DIAGNOSIS — E11.65 TYPE 2 DIABETES MELLITUS WITH HYPERGLYCEMIA, WITH LONG-TERM CURRENT USE OF INSULIN (H): ICD-10-CM

## 2017-06-23 DIAGNOSIS — N39.0 URINARY TRACT INFECTION: ICD-10-CM

## 2017-06-23 DIAGNOSIS — J84.9 ILD (INTERSTITIAL LUNG DISEASE) (H): ICD-10-CM

## 2017-06-23 LAB
ANION GAP SERPL CALCULATED.3IONS-SCNC: 4 MMOL/L (ref 3–14)
BUN SERPL-MCNC: 20 MG/DL (ref 7–30)
CALCIUM SERPL-MCNC: 9.3 MG/DL (ref 8.5–10.1)
CHLORIDE SERPL-SCNC: 102 MMOL/L (ref 94–109)
CO2 SERPL-SCNC: 31 MMOL/L (ref 20–32)
CREAT SERPL-MCNC: 1 MG/DL (ref 0.52–1.04)
DEPRECATED CALCIDIOL+CALCIFEROL SERPL-MC: 25 UG/L (ref 20–75)
GFR SERPL CREATININE-BSD FRML MDRD: 54 ML/MIN/1.7M2
GLUCOSE SERPL-MCNC: 197 MG/DL (ref 70–99)
POTASSIUM SERPL-SCNC: 4.3 MMOL/L (ref 3.4–5.3)
SODIUM SERPL-SCNC: 137 MMOL/L (ref 133–144)

## 2017-06-23 PROCEDURE — 36415 COLL VENOUS BLD VENIPUNCTURE: CPT

## 2017-06-23 PROCEDURE — 87086 URINE CULTURE/COLONY COUNT: CPT | Performed by: EMERGENCY MEDICINE

## 2017-06-23 PROCEDURE — 81001 URINALYSIS AUTO W/SCOPE: CPT | Performed by: EMERGENCY MEDICINE

## 2017-06-23 PROCEDURE — 99284 EMERGENCY DEPT VISIT MOD MDM: CPT | Mod: Z6 | Performed by: EMERGENCY MEDICINE

## 2017-06-23 PROCEDURE — 80048 BASIC METABOLIC PNL TOTAL CA: CPT | Performed by: EMERGENCY MEDICINE

## 2017-06-23 PROCEDURE — 99207 ZZC NO CHARGE NURSE ONLY: CPT | Performed by: FAMILY MEDICINE

## 2017-06-23 PROCEDURE — 99283 EMERGENCY DEPT VISIT LOW MDM: CPT

## 2017-06-23 PROCEDURE — 00000146 ZZHCL STATISTIC GLUCOSE BY METER IP

## 2017-06-23 NOTE — TELEPHONE ENCOUNTER
Reason for Call:  Other INR     Detailed comments: patient had her INR checked yesterday and got results but did not get any dosing instructions.  Please advise.     Phone Number Patient can be reached at: Home number on file [phone] (home)    Best Time: any    Can we leave a detailed message on this number? YES  Ok to leave info on voice mail     Call taken on 6/23/2017 at 2:23 PM by Sabiha Finn

## 2017-06-23 NOTE — ED AVS SNAPSHOT
" [facility], Emergency Department    [address] [facility] [address]    Phone:  [phone]                                       Betty Tee   MRN: [id number]    Department:  [facility], Emergency Department   Date of Visit:  6/23/2017           Patient Information     Date Of Birth          1940        Your diagnoses for this visit were:     Urinary tract infection     Hyperglycemia        You were seen by Carrillo Deluna MD.        Discharge Instructions       Please make an appointment to follow up with Your Primary Care Provider as soon as possible.    Cipro as instructed.    Return to emergency Department for any problems.         * BLADDER INFECTION,Female (Adult)    A bladder infection (\"cystitis\" or \"UTI\") usually causes a constant urge to urinate and a burning when passing urine. Urine may be cloudy, smelly or dark. There may be pain in the lower abdomen. A bladder infection occurs when bacteria from the vaginal area enter the bladder opening (urethra). This can occur from sexual intercourse, wearing tight clothing, dehydration and other factors.  HOME CARE:  1. Drink lots of fluids (at least 6-8 glasses a day, unless you must restrict fluids for other medical reasons). This will force the medicine into your urinary system and flush the bacteria out of your body. Cranberry juice has been shown to help clear out the bacteria.  2. Avoid sexual intercourse until your symptoms are gone.  3. A bladder infection is treated with antibiotics. You may also be given Pyridium (generic = phenazopyridine) to reduce the burning sensation. This medicine will cause your urine to become a bright orange color. The orange urine may stain clothing. You may wear a pad or panty-liner to protect clothing.  PREVENTING FUTURE INFECTIONS:  1. Always wipe from front to back after a bowel movement.  2. Keep the genital area clean and dry.  3. Drink plenty of fluids each day to avoid " dehydration.  4. Urinate right after intercourse to flush out the bladder.  5. Wear cotton underwear and cotton-lined panty hose; avoid tight-fitting pants.  6. If you are on birth control pills and are having frequent bladder infections, discuss with your doctor.  FOLLOW UP: Return to this facility or see your doctor if ALL symptoms are not gone after three days of treatment.  GET PROMPT MEDICAL ATTENTION if any of the following occur:    Fever over 101 F (38.3 C)    No improvement by the third day of treatment    Increasing back or abdominal pain    Repeated vomiting; unable to keep medicine down    Weakness, dizziness or fainting    Vaginal discharge    Pain, redness or swelling in the labia (outer vaginal area)    2510-8185 [employer], [address] [place] [address], [place] [address]. All rights reserved. This information is not intended as a substitute for professional medical care. Always follow your healthcare professional's instructions.      Future Appointments        Provider Department Dept Phone Center    6/26/2017 1:30 PM [facility] BONE DENSITOMETRY [facility] [phone] [facility]    6/29/2017 1:00 PM Lab [facility] [phone] [facility]    7/5/2017 11:00 AM Ilene Tristan MD [facility] [phone]     8/24/2017 12:30 PM Lab [facility] [phone] [facility]    8/24/2017 1:00 PM LIZY Rivera [facility] [phone] [facility]    10/4/2017 11:00 AM Meño Walsh MD [facility] [phone] [facility]    10/26/2017 2:30 PM [facility] [phone] [facility]    10/26/2017 3:00 PM Radha Rosa MD [facility] [phone] [facility]      24 Hour Appointment Hotline       To make an appointment at any [facility], call [phone] ([phone]). If you don't have a family doctor or clinic, we will help you find one. [facility] are conveniently located to serve the needs of you and  your family.             Review of your medicines      START taking        Dose / Directions Last dose taken    ciprofloxacin 500 MG tablet   Commonly known as:  CIPRO   Dose:  500 mg   Quantity:  10 tablet        Take 1 tablet (500 mg) by mouth 2 times daily for 5 days   Refills:  0          Our records show that you are taking the medicines listed below. If these are incorrect, please call your family doctor or clinic.        Dose / Directions Last dose taken    acyclovir 400 MG tablet   Commonly known as:  ZOVIRAX   Dose:  400 mg        Take 400 mg by mouth See Admin Instructions 5 times daily as needed for outbreaks   Refills:  0        acyclovir 5 % ointment   Commonly known as:  ZOVIRAX   Quantity:  15 g        Apply topically 6 times daily   Refills:  3        albuterol 108 (90 BASE) MCG/ACT Inhaler   Commonly known as:  PROAIR HFA/PROVENTIL HFA/VENTOLIN HFA   Dose:  2 puff   Quantity:  1 Inhaler        Inhale 2 puffs into the lungs every 6 hours   Refills:  3        atorvastatin 40 MG tablet   Commonly known as:  LIPITOR   Dose:  40 mg   Quantity:  90 tablet        Take 1 tablet (40 mg) by mouth daily   Refills:  1        * blood glucose monitoring lancets   Quantity:  4 Box        Use to test blood sugar 4 times daily or as directed.   Refills:  3        * blood glucose monitoring lancets   Quantity:  1 Box        Use to test blood sugar 4 times daily or as directed.  Ok to substitute alternative if insurance prefers.   Refills:  11        * blood glucose monitoring test strip   Commonly known as:  no brand specified   Quantity:  300 strip        Use to test blood sugars 4 times daily or as directed   Refills:  3        * blood glucose monitoring test strip   Commonly known as:  ACCU-CHEK SHANNON PLUS   Quantity:  120 strip        Use to test blood sugar 4 times daily or as directed.  Ok to substitute alternative if insurance prefers.   Refills:  11        COMPRESSION STOCKINGS   Quantity:  2 each        Wear  compression stockings in affected leg (right leg) or both legs most of the time during the day and take them off at night.   Refills:  2        ferrous sulfate 325 (65 FE) MG tablet   Commonly known as:  IRON   Dose:  325 mg   Quantity:  30 tablet        Take 1 tablet (325 mg) by mouth daily (with breakfast)   Refills:  3        fluticasone 220 MCG/ACT Inhaler   Commonly known as:  FLOVENT HFA   Dose:  2 puff   Quantity:  3 Inhaler        Inhale 2 puffs into the lungs 2 times daily   Refills:  3        fluticasone 50 MCG/ACT spray   Commonly known as:  FLONASE   Dose:  1-2 spray   Quantity:  16 g        Spray 1-2 sprays into both nostrils daily   Refills:  5        * furosemide 40 MG tablet   Commonly known as:  LASIX   Quantity:  30 tablet        40 mg in AM, 20 mg in afternoon, starting Mon. 6/26   Refills:  0        * furosemide 40 MG tablet   Commonly known as:  LASIX   Quantity:  180 tablet        Take 40 mg twice, daily on Fri. Sat, Sun   Refills:  3        insulin aspart 100 UNIT/ML injection   Commonly known as:  NovoLOG FLEXPEN   Dose:  12 Units   Quantity:  45 mL        Inject 12 Units Subcutaneous 3 times daily (with meals)   Refills:  0        * insulin pen needle 31G X 8 MM   Commonly known as:  B-D U/F   Quantity:  400 each        Use 4 times daily (with Lantus and Novolog) or as directed.   Refills:  3        * insulin pen needle 32G X 4 MM   Commonly known as:  BD JANE U/F   Quantity:  200 each        Use 4 daily as directed.   Refills:  11        ketoconazole 2 % cream   Commonly known as:  NIZORAL   Quantity:  60 g        Apply topically 2 times daily   Refills:  1        lactobacillus rhamnosus (GG) capsule   Dose:  1 capsule   Quantity:  90 capsule        Take 1 capsule by mouth 3 times daily (before meals)   Refills:  1        LANTUS SOLOSTAR 100 UNIT/ML injection   Quantity:  15 mL   Generic drug:  insulin glargine        INJECT 25 UNTIS SUBCUTANEOUSLY EVERY DAY   Refills:  1        lisinopril  2.5 MG tablet   Commonly known as:  PRINIVIL/Zestril   Dose:  2.5 mg   Quantity:  90 tablet        Take 1 tablet (2.5 mg) by mouth daily   Refills:  1        metFORMIN 500 MG 24 hr tablet   Commonly known as:  GLUCOPHAGE-XR   Dose:  1000 mg   Quantity:  180 tablet        Take 2 tablets (1,000 mg) by mouth daily (with dinner)   Refills:  0        metoprolol 100 MG 24 hr tablet   Commonly known as:  TOPROL-XL   Dose:  100 mg   Quantity:  90 tablet        Take 1 tablet (100 mg) by mouth daily   Refills:  3        montelukast 10 MG tablet   Commonly known as:  SINGULAIR   Dose:  10 mg   Quantity:  90 tablet        Take 1 tablet (10 mg) by mouth At Bedtime   Refills:  1        * order for DME   Quantity:  1 Device        Oxygen: Patient requires supplemental Oxygen 4 LPM via nasal canula with activity. Please provide patient with a home unit and with portability capability. Oxygen will be for a lifetime.   Refills:  0        * order for DME   Quantity:  1 Device        Oxygen: Patient requires supplemental Oxygen 4 LPM via nasal canula with activity. Please provide patient with POC to improve mobility, okay to titrate for conserving to keep stats above 90%. Please fax results to [phone].  Oxygen will be for a lifetime.   Refills:  0        * pantoprazole 20 MG EC tablet   Commonly known as:  PROTONIX   Dose:  20-40 mg   Quantity:  60 tablet        Take 1-2 tablets (20-40 mg) by mouth daily   Refills:  1        * pantoprazole 40 MG EC tablet   Commonly known as:  PROTONIX   Dose:  40 mg   Quantity:  90 tablet        Take 1 tablet (40 mg) by mouth daily   Refills:  1        PARoxetine 20 MG tablet   Commonly known as:  PAXIL   Quantity:  20 tablet        TAKE 1 TABLET (10mg) BY MOUTH AT BEDTIME   Refills:  3        polyethylene glycol Packet   Commonly known as:  MIRALAX/GLYCOLAX   Dose:  17 g   Quantity:  7 packet        Take 17 g by mouth daily as needed for constipation   Refills:  0        potassium chloride SA 20  MEQ CR tablet   Commonly known as:  K-DUR/KLOR-CON M   Dose:  40 mEq   Quantity:  180 tablet        Take 2 tablets (40 mEq) by mouth daily   Refills:  3        * predniSONE 10 MG tablet   Commonly known as:  DELTASONE   Quantity:  90 tablet        Take 2 tablets for three days (4/12-14), then take 1 tablet daily (10 mg daily starting 4/15)   Refills:  3        * predniSONE 1 MG tablet   Commonly known as:  DELTASONE   Dose:  1 mg   Quantity:  30 tablet        Take 1 tablet (1 mg) by mouth daily   Refills:  3        spironolactone 25 MG tablet   Commonly known as:  ALDACTONE   Dose:  50 mg   Quantity:  180 tablet        Take 2 tablets (50 mg) by mouth daily   Refills:  3        traZODone 50 MG tablet   Commonly known as:  DESYREL   Quantity:  90 tablet        TAKE 1/2-1 TABLET BY MOUTH NIGHTLY AS NEEDED, CAN TITRATE DOWN TO 1/2TAB OR UP TO 2TABS AS NEEDED   Refills:  1        TYLENOL 500 MG tablet   Dose:  500 mg   Generic drug:  acetaminophen        Take 500 mg by mouth nightly as needed   Refills:  0        warfarin 7.5 MG tablet   Commonly known as:  COUMADIN   Quantity:  100 tablet        Current dose is 7.5 mg daily.  Dose adjusted per INR result.   Refills:  3        * Notice:  This list has 14 medication(s) that are the same as other medications prescribed for you. Read the directions carefully, and ask your doctor or other care provider to review them with you.            Prescriptions were sent or printed at these locations (1 Prescription)                   Other Prescriptions                Printed at Department/Unit printer (1 of 1)         ciprofloxacin (CIPRO) 500 MG tablet                Procedures and tests performed during your visit     Basic metabolic panel    UA with Microscopic reflex to Culture    Urine Culture Aerobic Bacterial      Orders Needing Specimen Collection     None      Pending Results     Date and Time Order Name Status Description    6/23/2017 8809 Urine Culture Aerobic Bacterial  In process             Pending Culture Results     Date and Time Order Name Status Description    6/23/2017 2343 Urine Culture Aerobic Bacterial In process             Pending Results Instructions     If you had any lab results that were not finalized at the time of your Discharge, you can call the ED Lab Result RN at [phone]. You will be contacted by this team for any positive Lab results or changes in treatment. The nurses are available 7 days a week from 10A to 6:30P.  You can leave a message 24 hours per day and they will return your call.        Thank you for choosing [facility]       Thank you for choosing [facility] for your care. Our goal is always to provide you with excellent care. Hearing back from our patients is one way we can continue to improve our services. Please take a few minutes to complete the written survey that you may receive in the mail after you visit with us. Thank you!        [facility] Information     [employer] gives you secure access to your electronic health record. If you see a primary care provider, you can also send messages to your care team and make appointments. If you have questions, please call your primary care clinic.  If you do not have a primary care provider, please call [phone] and they will assist you.        Care EveryWhere ID     This is your Care EveryWhere ID. This could be used by other organizations to access your [facility] medical records  [id number]        Equal Access to Services     GENNY STONER : Gilda Trammell, waaxda luqadaha, qaybta kaalmada adecarri, anderson shaikh. So [facility] [phone].    ATENCIÓN: Si habla español, tiene a conti disposición servicios gratuitos de asistencia lingüística. Llame al [phone].    We comply with applicable federal civil rights laws and [place] laws. We do not discriminate on the basis of race, color, national origin, age, disability sex, sexual orientation or gender  identity.            After Visit Summary       This is your record. Keep this with you and show to your community pharmacist(s) and doctor(s) at your next visit.

## 2017-06-23 NOTE — ED AVS SNAPSHOT
[facility], [place], Emergency Department    [address] [place] [address]    Phone:  [phone]                                       Betty Tee   MRN: [id number]    Department:  [facility], Emergency Department   Date of Visit:  6/23/2017           After Visit Summary Signature Page     I have received my discharge instructions, and my questions have been answered. I have discussed any challenges I see with this plan with the nurse or doctor.    ..........................................................................................................................................  Patient/Patient Representative Signature      ..........................................................................................................................................  Patient Representative Print Name and Relationship to Patient    ..................................................               ................................................  Date                                            Time    ..........................................................................................................................................  Reviewed by Signature/Title    ...................................................              ..............................................  Date                                                            Time

## 2017-06-23 NOTE — TELEPHONE ENCOUNTER
INR 1.64 yesterday. Done at Dr. Rosa office (cardiology).  Sophia Hemphill RN    ANTICOAGULATION FOLLOW-UP CLINIC VISIT    Patient Name:  Betty Tee  Date:  6/23/2017  Contact Type:  Telephone. Dose instructions left on patient's voice mail. INR done yesterday at cardiology appointment    SUBJECTIVE:  Bleeding Signs/Symptoms: None  Thromboembolic Signs/Symptoms: None    Medication Changes:  No  Dietary Changes:  No  Bacterial/Viral Infection:  No    Missed Coumadin Doses:  None  Other Concerns:  No      ASSESSMENT/PLAN:  See: ANTICOAGULATION QIC flow sheet.    INR 1.64 (done yesterday/cardiology).  Plan: 11.25 mg FS and 7.5mg rest of week. Recheck INR in 1 week.  Sophia Hemphill RN      Dosage adjustment made based on physician directed care plan.    JIMI VOGT

## 2017-06-24 VITALS
BODY MASS INDEX: 32.3 KG/M2 | TEMPERATURE: 97.8 F | HEIGHT: 66 IN | WEIGHT: 201 LBS | OXYGEN SATURATION: 95 % | DIASTOLIC BLOOD PRESSURE: 95 MMHG | RESPIRATION RATE: 16 BRPM | SYSTOLIC BLOOD PRESSURE: 137 MMHG

## 2017-06-24 LAB
ALBUMIN UR-MCNC: 10 MG/DL
APPEARANCE UR: ABNORMAL
BACTERIA #/AREA URNS HPF: ABNORMAL /HPF
BILIRUB UR QL STRIP: NEGATIVE
COLOR UR AUTO: YELLOW
GLUCOSE BLDC GLUCOMTR-MCNC: 205 MG/DL (ref 70–99)
GLUCOSE UR STRIP-MCNC: >1000 MG/DL
HGB UR QL STRIP: ABNORMAL
KETONES UR STRIP-MCNC: 5 MG/DL
LEUKOCYTE ESTERASE UR QL STRIP: ABNORMAL
MUCOUS THREADS #/AREA URNS LPF: PRESENT /LPF
NITRATE UR QL: NEGATIVE
PH UR STRIP: 6 PH (ref 5–7)
RBC #/AREA URNS AUTO: 23 /HPF (ref 0–2)
SP GR UR STRIP: 1.01 (ref 1–1.03)
SQUAMOUS #/AREA URNS AUTO: 2 /HPF (ref 0–1)
URN SPEC COLLECT METH UR: ABNORMAL
UROBILINOGEN UR STRIP-MCNC: NORMAL MG/DL (ref 0–2)
WBC #/AREA URNS AUTO: 20 /HPF (ref 0–2)

## 2017-06-24 PROCEDURE — A9270 NON-COVERED ITEM OR SERVICE: HCPCS | Mod: GY | Performed by: EMERGENCY MEDICINE

## 2017-06-24 PROCEDURE — 25000132 ZZH RX MED GY IP 250 OP 250 PS 637: Mod: GY | Performed by: EMERGENCY MEDICINE

## 2017-06-24 RX ORDER — CIPROFLOXACIN 500 MG/1
500 TABLET, FILM COATED ORAL 2 TIMES DAILY
Qty: 10 TABLET | Refills: 0 | Status: SHIPPED | OUTPATIENT
Start: 2017-06-24 | End: 2017-06-29

## 2017-06-24 RX ORDER — CIPROFLOXACIN 500 MG/1
500 TABLET, FILM COATED ORAL ONCE
Status: COMPLETED | OUTPATIENT
Start: 2017-06-24 | End: 2017-06-24

## 2017-06-24 RX ADMIN — CIPROFLOXACIN HYDROCHLORIDE 500 MG: 500 TABLET, FILM COATED ORAL at 00:58

## 2017-06-24 NOTE — ED PROVIDER NOTES
[facility] ([facility])  June 23, 2017  ED 6 10:39 PM   History     Chief Complaint   Patient presents with     Hyperglycemia     The history is provided by the patient and medical records.     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 She has a history of Sjogren's syndrome with ILD prednisone for years, atrial fibrillation, hypokalemia. In addition patient is newly diagnosed diabetic, was here Thursday and Friday to see Pulmonology, Cardiology, Rheumatology and had labs done and everything was fine. Tonight patient noticed her glucometer wasn t working. She went ahead and gave herself 12 units of insulin. She went out for dinner with a friend and afterwards. She went to a [facility] and asked the pharmacist for help with the glucometer. Pharmacist fixed it, checked her blood sugar and it was 313. Pharmacist contacted patient s clinic, spoke to triage and was told to go to the ER. The clinic nurse emphasized that patient should not to drive...another friend went to drive patient to the ER. Here she notes having a small tapas style meal with small servings of food, including a small serving of potato salad and dessert consisting of 2 doughnut holes. She also notes that her doctors have been trying to wean her off prednisone. She is also on spironolactone, has noticed decreased urination despite of being on higher dose of spironolactone. She notes that in the past she would get increased urination with spironolactone. No dysuria. She has had UTIs in the past, but states they were asymptomatic.     I have reviewed the Medications, Allergies, Past Medical and Surgical History, and Social History in the [facility] system.  PAST MEDICAL HISTORY:   Past Medical History:   Diagnosis Date     Alcohol abuse, in remission      Allergic rhinitis, cause unspecified     allegra helps when she takes it     Antiplatelet or antithrombotic long-term use      Atrial  fibrillation (H)     in hosp in 11/11 after surgery w/ fluid overload     Cardiomegaly     LVH on stress echo- cardiac w/u at [facility] ER- neg CT scan for PE, neg stress echo in 8/06     Chest pain, unspecified      Disorder of bone and cartilage, unspecified     osteopenia (had been on prempro), improved on 6/06 dexa, stable dexa 11/10     Diverticulosis of colon (without mention of hemorrhage)     last episode yrs ago     Essential hypertension, benign      Gastro-oesophageal reflux disease      Insomnia, unspecified     weaned off clonazepam     Irregular heart beat      Lumbago 7/09    MRI with DJD, now seeing Dr. Cain for sciatic sx's     Major depressive disorder, recurrent episode, moderate (H)      Obstructive sleep apnea      Osteoarthrosis, unspecified whether generalized or localized, unspecified site      Sjogren's syndrome (H)      Sleep apnea      Tobacco use disorder     chantix in 9/07, started again in 6/08, working       PAST SURGICAL HISTORY:   Past Surgical History:   Procedure Laterality Date     BACK SURGERY  1962     BIOPSY BREAST  9/27/02    Biopsy Left Breast     C APPENDECTOMY  1970's?     C NONSPECIFIC PROCEDURE  11/05    exploratory abd lap, adhesions, resolved RLQ pain, diverticulitis episodes     CARDIAC SURGERY       CHOLECYSTECTOMY  1990's?     COLECTOMY LEFT  11/7/2011    Procedure:COLECTOMY LEFT; Laparoscopic mobilization of splenic flexture, sigmoid colectomy, coloprotoscopy, loop illeostomy; Surgeon:CK CASTANEDA; Location:[facility] OR     HYSTERECTOMY TOTAL ABDOMINAL, BILATERAL SALPINGO-OOPHORECTOMY, COMBINED  11/7/2011    Procedure:COMBINED HYSTERECTOMY TOTAL ABDOMINAL, BILATERAL SALPINGO-OOPHORECTOMY; total abdominal hysterectomy, bilateral salpingo-oophorectomy; Surgeon:ALETA MANUEL; Location[facility] OR     INSERT STENT URETER  11/7/2011    Procedure:INSERT STENT URETER; Placement of Bilateral Ureteral Stents ; Surgeon:PRANEETH BRYANT; Location[facility] OR     SIGMOIDOSCOPY FLEXIBLE   11/3/2011    Procedure:SIGMOIDOSCOPY FLEXIBLE; Flexible Sigmoidoscopy; Surgeon:CK CASTANEDA; Location[facility] OR     TAKEDOWN ILEOSTOMY  2/1/2012    Procedure:TAKEDOWN ILEOSTOMY; Takedown Loop Ileostomy ; Surgeon:CK CASTANEDA; Location[facility] OR       SOCIAL HISTORY:   Social History   Substance Use Topics     Smoking status: Former Smoker     Packs/day: 0.50     Years: 10.00     Types: Cigarettes     Quit date: 8/1/2011     Smokeless tobacco: Never Used      Comment: 1/2 ppd     Alcohol use No      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       Discharge Medication List as of 6/24/2017  1:08 AM      START taking these medications    Details   ciprofloxacin (CIPRO) 500 MG tablet Take 1 tablet (500 mg) by mouth 2 times daily for 5 days, Disp-10 tablet, R-0, Local Print         CONTINUE these medications which have NOT CHANGED    Details   !! furosemide (LASIX) 40 MG tablet 40 mg in AM, 20 mg in afternoon, starting Mon. 6/26, Disp-30 tablet, Historical      spironolactone (ALDACTONE) 25 MG tablet Take 2 tablets (50 mg) by mouth daily, Disp-180 tablet, R-3, E-Prescribe      !! predniSONE (DELTASONE) 1 MG tablet Take 1 tablet (1 mg) by mouth daily, Disp-30 tablet, R-3, E-Prescribe      traZODone (DESYREL) 50 MG tablet TAKE 1/2-1 TABLET BY MOUTH NIGHTLY AS NEEDED, CAN TITRATE DOWN TO 1/2TAB OR UP TO 2TABS AS NEEDED, Disp-90 tablet, R-1, E-Prescribe      LANTUS SOLOSTAR 100 UNIT/ML soln INJECT 25 UNTIS SUBCUTANEOUSLY EVERY DAY, Disp-15 mL, R-1, E-Prescribe      insulin aspart (NOVOLOG FLEXPEN) 100 UNIT/ML injection Inject 12 Units Subcutaneous 3 times daily (with meals), Disp-45 mL, R-0, E-Prescribe      lisinopril (PRINIVIL/ZESTRIL) 2.5 MG tablet Take 1 tablet (2.5 mg) by mouth daily, Disp-90 tablet, R-1, E-Prescribe      atorvastatin (LIPITOR) 40 MG tablet Take 1 tablet (40 mg) by mouth daily, Disp-90 tablet, R-1, E-Prescribe      lactobacillus rhamnosus, GG, (CULTURELL) capsule Take 1 capsule by mouth 3 times daily  (before meals), Disp-90 capsule, R-1, Local Print      metFORMIN (GLUCOPHAGE-XR) 500 MG 24 hr tablet Take 2 tablets (1,000 mg) by mouth daily (with dinner), Disp-180 tablet, R-0, E-Prescribe      !! pantoprazole (PROTONIX) 20 MG EC tablet Take 1-2 tablets (20-40 mg) by mouth daily, Disp-60 tablet, R-1, E-PrescribePlease fill upon patient request.      metoprolol (TOPROL-XL) 100 MG 24 hr tablet Take 1 tablet (100 mg) by mouth daily, Disp-90 tablet, R-3, E-Prescribe      montelukast (SINGULAIR) 10 MG tablet Take 1 tablet (10 mg) by mouth At Bedtime, Disp-90 tablet, R-1, E-Prescribe      warfarin (COUMADIN) 7.5 MG tablet Current dose is 7.5 mg daily.  Dose adjusted per INR result., Disp-100 tablet, R-3, E-Prescribe      fluticasone (FLOVENT HFA) 220 MCG/ACT inhaler Inhale 2 puffs into the lungs 2 times daily, Disp-3 Inhaler, R-3, E-Prescribe      fluticasone (FLONASE) 50 MCG/ACT nasal spray Spray 1-2 sprays into both nostrils daily, Disp-16 g, R-5, E-Prescribe      potassium chloride SA (K-DUR/KLOR-CON M) 20 MEQ CR tablet Take 2 tablets (40 mEq) by mouth daily, Disp-180 tablet, R-3, E-Prescribe      !! furosemide (LASIX) 40 MG tablet Take 40 mg twice, daily on Fri. Sat, Sun, Disp-180 tablet, R-3, Historical      PARoxetine (PAXIL) 20 MG tablet TAKE 1 TABLET (10mg) BY MOUTH AT BEDTIME, Disp-20 tablet, R-3, E-Prescribe      ferrous sulfate (IRON) 325 (65 FE) MG tablet Take 1 tablet (325 mg) by mouth daily (with breakfast), Disp-30 tablet, R-3, E-Prescribe      !! pantoprazole (PROTONIX) 40 MG EC tablet Take 1 tablet (40 mg) by mouth daily, Disp-90 tablet, R-1, E-Prescribe      !! blood glucose monitoring (NO BRAND SPECIFIED) test strip Use to test blood sugars 4 times daily or as directed, Disp-300 strip, R-3, E-Prescribe      !! blood glucose monitoring (ACCU-CHEK MULTICLIX) lancets Use to test blood sugar 4 times daily or as directed.Disp-4 Box, [id number]      !! insulin pen needle (B-D U/F) 31G X 8 MM Use 4 times  daily (with Lantus and Novolog) or as directed.Disp-400 each, [id number]      !! insulin pen needle (BD JANE U/F) 32G X 4 MM Use 4 daily as directed.Disp-200 each, [id number]      !! blood glucose monitoring (ACCU-CHEK SHANNON PLUS) test strip Use to test blood sugar 4 times daily or as directed.  Ok to substitute alternative if insurance prefers., Disp-120 strip, R-11, E-Prescribe      !! blood glucose monitoring (ACCU-CHEK FASTCLIX) lancets Use to test blood sugar 4 times daily or as directed.  Ok to substitute alternative if insurance prefers., Disp-1 Box, R-11, E-Prescribe      !! predniSONE (DELTASONE) 10 MG tablet Take 2 tablets for three days (4/12-14), then take 1 tablet daily (10 mg daily starting 4/15), Disp-90 tablet, R-3, E-Prescribe      !! order for DME Oxygen: Patient requires supplemental Oxygen 4 LPM via nasal canula with activity. Please provide patient with POC to improve mobility, okay to titrate for conserving to keep stats above 90%. Please fax results to [phone].  Oxygen will be for a life time.Disp-1 Device, R-0, Local Print      ketoconazole (NIZORAL) 2 % cream Apply topically 2 times dailyDisp-60 g, [id number]      albuterol (PROAIR HFA, PROVENTIL HFA, VENTOLIN HFA) 108 (90 BASE) MCG/ACT inhaler Inhale 2 puffs into the lungs every 6 hours, Disp-1 Inhaler, R-3, E-Prescribe      !! order for DME Oxygen: Patient requires supplemental Oxygen 4 LPM via nasal canula with activity. Please provide patient with a home unit and with portability capability. Oxygen will be for a lifetime.Disp-1 Device, R-0, Local Print      polyethylene glycol (MIRALAX/GLYCOLAX) packet Take 17 g by mouth daily as needed for constipation, Disp-7 packet, R-0, Transitional      acyclovir (ZOVIRAX) 5 % ointment Apply topically 6 times dailyDisp-15 g, [id number]      acyclovir (ZOVIRAX) 400 MG tablet Take 400 mg by mouth See Admin Instructions 5 times daily as needed for outbreaks, Historical     "  COMPRESSION STOCKINGS Wear compression stockings in affected leg (right leg) or both legs most of the time during the day and take them off at night., Disp-2 each, R-2, Local PrintPlease dispense 2 pairs of knee high graduated compression stockings at the rating of 20-30 mmH g with 2 refills.      acetaminophen (TYLENOL) 500 MG tablet Take 500 mg by mouth nightly as needed , Historical       !! - Potential duplicate medications found. Please discuss with provider.             Allergies   Allergen Reactions     Augmentin Nausea and Vomiting     Codeine Nausea and Vomiting     Phenobarbital Itching      Review of Systems   Constitutional: Negative for fever.   Respiratory: Negative for shortness of breath.    Cardiovascular: Negative for chest pain.   Gastrointestinal: Negative for abdominal pain, nausea and vomiting.   Genitourinary: Negative for flank pain, hematuria and urgency.   Neurological: Negative for syncope.   All other systems reviewed and are negative.      Physical Exam   BP: 138/68  Heart Rate: 96  Temp: 98.7  F (37.1  C)  Height: 167.6 cm (5' 6\")  Weight: 91.2 kg (201 lb)  SpO2: (!) 78 %    Physical Exam   Constitutional: She is oriented to person, place, and time. Vital signs are normal. She appears well-developed and well-nourished.  Non-toxic appearance. She does not appear ill. No distress.   HENT:   Head: Normocephalic and atraumatic.   Mouth/Throat: Oropharynx is clear and moist. No oropharyngeal exudate.   Eyes: Conjunctivae and EOM are normal. Pupils are equal, round, and reactive to light. No scleral icterus.   Neck: Normal range of motion. Neck supple. No JVD present. No tracheal deviation present. No thyromegaly present.   Cardiovascular: Normal rate, regular rhythm, normal heart sounds and intact distal pulses.  Exam reveals no gallop and no friction rub.    No murmur heard.  Pulmonary/Chest: Effort normal and breath sounds normal. No respiratory distress.   Abdominal: Soft. Bowel sounds " are normal. She exhibits no distension and no mass. There is no tenderness.   Musculoskeletal: Normal range of motion. She exhibits no edema or tenderness.   Lymphadenopathy:     She has no cervical adenopathy.   Neurological: She is alert and oriented to person, place, and time. She has normal strength. No cranial nerve deficit or sensory deficit.   Skin: Skin is warm and dry. No rash noted. No erythema. No pallor.   Psychiatric: She has a normal mood and affect. Her behavior is normal.   Nursing note and vitals reviewed.      ED Course     ED Course     Procedures        Results for orders placed or performed during the hospital encounter of 06/23/17   Basic metabolic panel   Result Value Ref Range    Sodium 137 133 - 144 mmol/L    Potassium 4.3 3.4 - 5.3 mmol/L    Chloride 102 94 - 109 mmol/L    Carbon Dioxide 31 20 - 32 mmol/L    Anion Gap 4 3 - 14 mmol/L    Glucose 197 (H) 70 - 99 mg/dL    Urea Nitrogen 20 7 - 30 mg/dL    Creatinine 1.00 0.52 - 1.04 mg/dL    GFR Estimate 54 (L) >60 mL/min/1.7m2    GFR Estimate If Black 65 >60 mL/min/1.7m2    Calcium 9.3 8.5 - 10.1 mg/dL   UA with Microscopic reflex to Culture   Result Value Ref Range    Color Urine Yellow     Appearance Urine Slightly Cloudy     Glucose Urine >1000 (A) NEG mg/dL    Bilirubin Urine Negative NEG    Ketones Urine 5 (A) NEG mg/dL    Specific Gravity Urine 1.012 1.003 - 1.035    Blood Urine Small (A) NEG    pH Urine 6.0 5.0 - 7.0 pH    Protein Albumin Urine 10 (A) NEG mg/dL    Urobilinogen mg/dL Normal 0.0 - 2.0 mg/dL    Nitrite Urine Negative NEG    Leukocyte Esterase Urine Large (A) NEG    Source Clean catch urine     WBC Urine 20 (H) 0 - 2 /HPF    RBC Urine 23 (H) 0 - 2 /HPF    Bacteria Urine Few (A) NEG /HPF    Squamous Epithelial /HPF Urine 2 (H) 0 - 1 /HPF    Mucous Urine Present (A) NEG /LPF   Glucose by meter   Result Value Ref Range    Glucose 205 (H) 70 - 99 mg/dL   Urine Culture Aerobic Bacterial   Result Value Ref Range    Specimen  Description Midstream Urine     Culture Micro >100,000 colonies/mL mixed urogenital juan     Micro Report Status FINAL 06/26/2017      *Note: Due to a large number of results and/or encounters for the requested time period, some results have not been displayed. A complete set of results can be found in Results Review.       Assessments & Plan (with Medical Decision Making)   This patient presented to the Emergency Department with elevated blood glucose.  Blood sugar has come down from her previously measured value of 300 and is 197 here.  No signs of diabetic ketoacidosis or hyperosmolar state.  She does have evidence  for mild urinary tract infection which may be causing some fluctuation in blood sugar, but also I suspect that the steroids that she is taking and the fact that she is newly diagnosed is also playing a part in difficulty with tight control.  At this point I m comfortable discharging her home and will treat her with Cipro for urinary tract infection.  She was told to follow-up with her primary clinic for reevaluation, culture review and also INR check as she is also on Coumadin.      This part of the document was transcribed by Daniel Quigley Scribe.      I have reviewed the nursing notes.    I have reviewed the findings, diagnosis, plan and need for follow up with the patient.    Discharge Medication List as of 6/24/2017  1:08 AM      START taking these medications    Details   ciprofloxacin (CIPRO) 500 MG tablet Take 1 tablet (500 mg) by mouth 2 times daily for 5 days, Disp-10 tablet, R-0, Local Print             Final diagnoses:   Urinary tract infection   Hyperglycemia   I, Sultana Greene, am serving as a trained medical scribe to document services personally performed by Carrillo Deluna MD, based on the provider's statements to me.    IHoward MD, was physically present and have reviewed and verified the accuracy of this note documented by daniel Navarro scribe.       6/23/2017   [facility], [place], EMERGENCY DEPARTMENT     Carrillo Deluna MD  07/08/17 0108

## 2017-06-24 NOTE — TELEPHONE ENCOUNTER
Reason for Disposition    [1] Blood glucose > 240 mg/dl (13 mmol/l) AND [2] rapid breathing    Additional Information    Negative: Unconscious or difficult to awaken    Negative: Acting confused (e.g., disoriented, slurred speech)    Negative: Very weak (e.g., can't stand)    Negative: Sounds like a life-threatening emergency to the triager    Negative: [1] Vomiting AND [2] signs of dehydration (e.g., very dry mouth, lightheaded, etc.)    Protocols used: DIABETES - HIGH BLOOD SUGAR-ADULT-

## 2017-06-24 NOTE — DISCHARGE INSTRUCTIONS
"Please make an appointment to follow up with Your Primary Care Provider as soon as possible.    Cipro as instructed.    Return to emergency Department for any problems.         * BLADDER INFECTION,Female (Adult)    A bladder infection (\"cystitis\" or \"UTI\") usually causes a constant urge to urinate and a burning when passing urine. Urine may be cloudy, smelly or dark. There may be pain in the lower abdomen. A bladder infection occurs when bacteria from the vaginal area enter the bladder opening (urethra). This can occur from sexual intercourse, wearing tight clothing, dehydration and other factors.  HOME CARE:  1. Drink lots of fluids (at least 6-8 glasses a day, unless you must restrict fluids for other medical reasons). This will force the medicine into your urinary system and flush the bacteria out of your body. Cranberry juice has been shown to help clear out the bacteria.  2. Avoid sexual intercourse until your symptoms are gone.  3. A bladder infection is treated with antibiotics. You may also be given Pyridium (generic = phenazopyridine) to reduce the burning sensation. This medicine will cause your urine to become a bright orange color. The orange urine may stain clothing. You may wear a pad or panty-liner to protect clothing.  PREVENTING FUTURE INFECTIONS:  1. Always wipe from front to back after a bowel movement.  2. Keep the genital area clean and dry.  3. Drink plenty of fluids each day to avoid dehydration.  4. Urinate right after intercourse to flush out the bladder.  5. Wear cotton underwear and cotton-lined panty hose; avoid tight-fitting pants.  6. If you are on birth control pills and are having frequent bladder infections, discuss with your doctor.  FOLLOW UP: Return to this facility or see your doctor if ALL symptoms are not gone after three days of treatment.  GET PROMPT MEDICAL ATTENTION if any of the following occur:    Fever over 101 F (38.3 C)    No improvement by the third day of " treatment    Increasing back or abdominal pain    Repeated vomiting; unable to keep medicine down    Weakness, dizziness or fainting    Vaginal discharge    Pain, redness or swelling in the labia (outer vaginal area)    [id number] [employer], [address] [place] [address], [place] [address]. All rights reserved. This information is not intended as a substitute for professional medical care. Always follow your healthcare professional's instructions.

## 2017-06-24 NOTE — ED NOTES
Pt presents for hyperglycemia, blood sugar 303 at 2000. Pt takes insulin, lantus and metformin at home, has taken all medications as prescribed today.

## 2017-06-26 ENCOUNTER — CARE COORDINATION (OUTPATIENT)
Dept: CARE COORDINATION | Facility: CLINIC | Age: 77
End: 2017-06-26

## 2017-06-26 LAB
BACTERIA SPEC CULT: NORMAL
MICRO REPORT STATUS: NORMAL
SPECIMEN SOURCE: NORMAL

## 2017-06-26 NOTE — PROGRESS NOTES
Clinic Care Coordination Contact  [facility]/Voicemail       Clinical Data: Care Coordinator Outreach  Outreach attempted x 1.  Left message on voicemail with call back information and requested return call.  Plan: Care Coordinator will mail out care coordination introduction letter with care coordinator contact information and explanation of care coordination services. Care Coordinator will try to reach patient again in 1-2 business days.  Meghann Rivera RN Clinic Care Coordinator  [facility] [phone]                 Assessments & Plan (with Medical Decision Making)   This patient presented to the Emergency Department with elevated blood glucose.  Blood sugar has come down from her previously measured value of 300 and is 197 here.  No signs of diabetic ketoacidosis or hyperosmolar state.  She does have evidence  for mild urinary tract infection which may be causing some fluctuation in blood sugar, but also I suspect that the steroids that she is taking and the fact that she is newly diagnosed is also playing a part in difficulty with tight control.  At this point I m comfortable discharging her home and will treat her with Cipro for urinary tract infection.  She was told to follow-up with her primary clinic for reevaluation, culture review and also INR check as she is also on Coumadin.

## 2017-06-26 NOTE — LETTER
[facility]ARE COORDINATION  [address] [address]  Phone: [phone]      June 26, 2017      Betty Tee  [address]  [facility] [address]    Dear Betty,    We have been trying to reach you to introduce you to [facility] Care Coordination program.  The goal of care coordination is to help you manage your health and improve access to the [facility] system in the most efficient manner.  The Care Coordinator is a nurse who understands the healthcare system and will assist you in improving your access to care.     As your Physician and Care Coordinator we partner to help you achieve your health care goals.     We will continue to reach out; however, if you are able to call your Care Coordinator at    [phone] that would be appreciated.  We at [facility] are focused on providing you with the highest-quality healthcare experience possible.      It is a pleasure to partner with you as we work towards achieving your optimal state of wellness.        Sincerely,        Ilene Lipscomb RN, MD  [facility] [address] [facility]  [address] / [place]*

## 2017-06-28 ENCOUNTER — CARE COORDINATION (OUTPATIENT)
Dept: CARDIOLOGY | Facility: CLINIC | Age: 77
End: 2017-06-28

## 2017-06-28 DIAGNOSIS — I48.91 ATRIAL FIBRILLATION (H): Primary | ICD-10-CM

## 2017-06-28 NOTE — PROGRESS NOTES
Pt called and wanted to confirm what she should be taking as far as potassium and sprionolactone  Reviewed instructions:   Confirmed pt should be taking 40 meq potassium daily and 50 mg spironolactone daily.     Pt will have labs tomorrow, INR added per pt request

## 2017-06-28 NOTE — PROGRESS NOTES
Clinic Care Coordination Contact  OUTREACH    Referral Information:  Referral Source: ED Follow-Up  Reason for Contact: ED Follow up for UTI and elevated Blood sugars  Care Conference: No     Universal Utilization:   ED Visits in last year: 4  Hospital visits in last year: 2  Last PCP appointment: 05/31/17     Concerns: increased doses of diuretics, prednisone use affecting blood sugars  Multiple Providers or Specialists: pulmo  cardio rheum    Clinical Concerns:  Current Medical Concerns: pt is concerned with the incidence of elevated blood sugars and is interested in resource for diabetic educators triage line at [phone]. RN CC [facility] [phone], Nannette Gould RN CC at [facility]   Pt is wanting several resources to contact if she has concerns regarding her diabetes  Current Behavioral Concerns: pt needing reasurance for her anxiety regarding diabetes and elevated blood sugars  Education Provided to patient: resources for diabetes, heart and PCP RN CC     Clinical Pathway: None    Medication Management:  Current Outpatient Prescriptions   Medication     ciprofloxacin (CIPRO) 500 MG tablet     potassium chloride SA (K-DUR/KLOR-CON M) 20 MEQ CR tablet     furosemide (LASIX) 40 MG tablet     furosemide (LASIX) 40 MG tablet     spironolactone (ALDACTONE) 25 MG tablet     predniSONE (DELTASONE) 1 MG tablet     traZODone (DESYREL) 50 MG tablet     PARoxetine (PAXIL) 20 MG tablet     ferrous sulfate (IRON) 325 (65 FE) MG tablet     LANTUS SOLOSTAR 100 UNIT/ML soln     insulin aspart (NOVOLOG FLEXPEN) 100 UNIT/ML injection     lisinopril (PRINIVIL/ZESTRIL) 2.5 MG tablet     atorvastatin (LIPITOR) 40 MG tablet     lactobacillus rhamnosus, GG, (CULTURELL) capsule     pantoprazole (PROTONIX) 40 MG EC tablet     blood glucose monitoring (NO BRAND SPECIFIED) test strip     blood glucose monitoring (ACCU-CHEK MULTICLIX) lancets     insulin pen needle (B-D U/F) 31G X 8 MM     insulin pen needle (BD JANE  U/F) 32G X 4 MM     blood glucose monitoring (ACCU-CHEK SHANNNO PLUS) test strip     blood glucose monitoring (ACCU-CHEK FASTCLIX) lancets     predniSONE (DELTASONE) 10 MG tablet     metFORMIN (GLUCOPHAGE-XR) 500 MG 24 hr tablet     order for DME     pantoprazole (PROTONIX) 20 MG EC tablet     ketoconazole (NIZORAL) 2 % cream     metoprolol (TOPROL-XL) 100 MG 24 hr tablet     montelukast (SINGULAIR) 10 MG tablet     albuterol (PROAIR HFA, PROVENTIL HFA, VENTOLIN HFA) 108 (90 BASE) MCG/ACT inhaler     order for DME     polyethylene glycol (MIRALAX/GLYCOLAX) packet     warfarin (COUMADIN) 7.5 MG tablet     acyclovir (ZOVIRAX) 5 % ointment     fluticasone (FLOVENT HFA) 220 MCG/ACT inhaler     acyclovir (ZOVIRAX) 400 MG tablet     fluticasone (FLONASE) 50 MCG/ACT nasal spray     COMPRESSION STOCKINGS     acetaminophen (TYLENOL) 500 MG tablet     No current facility-administered medications for this visit.      Pt states that she is current with her meds listed     Functional Status:        Transportation: pt drives herself           Psychosocial:  Current living arrangement:: I live in a private home  Financial/Insurance: [employer]       Resources and Interventions:  Current Resources:  ; Other (see comment) (Diabetic Ed Triage line, RN CC [facility] clinic, [facility])                 Goals:                  Barriers: understanding diabetes  Strengths:  Sofia has a good sense of humor  Patient/Caregiver understanding: good       Upcoming appointment: 07/05/17     Plan: Pt has multiple phone contacts for resources provided by RN CC   Pt will contact Nannette GOMEZ CC if she is wanting further diabetic education  Meghann Rivera RN Clinic Care Coordinator  [facility] [phone]

## 2017-06-29 ENCOUNTER — CARE COORDINATION (OUTPATIENT)
Dept: CARDIOLOGY | Facility: CLINIC | Age: 77
End: 2017-06-29

## 2017-06-29 DIAGNOSIS — I48.91 ATRIAL FIBRILLATION (H): ICD-10-CM

## 2017-06-29 DIAGNOSIS — J96.21 ACUTE AND CHRONIC RESPIRATORY FAILURE WITH HYPOXIA (H): ICD-10-CM

## 2017-06-29 DIAGNOSIS — I50.9 ACUTE ON CHRONIC HEART FAILURE, UNSPECIFIED HEART FAILURE TYPE (H): ICD-10-CM

## 2017-06-29 DIAGNOSIS — I50.30 (HFPEF) HEART FAILURE WITH PRESERVED EJECTION FRACTION (H): ICD-10-CM

## 2017-06-29 LAB
ANION GAP SERPL CALCULATED.3IONS-SCNC: 8 MMOL/L (ref 3–14)
BUN SERPL-MCNC: 24 MG/DL (ref 7–30)
CALCIUM SERPL-MCNC: 9.2 MG/DL (ref 8.5–10.1)
CHLORIDE SERPL-SCNC: 99 MMOL/L (ref 94–109)
CO2 SERPL-SCNC: 28 MMOL/L (ref 20–32)
CREAT SERPL-MCNC: 0.84 MG/DL (ref 0.52–1.04)
GFR SERPL CREATININE-BSD FRML MDRD: 66 ML/MIN/1.7M2
GLUCOSE SERPL-MCNC: 138 MG/DL (ref 70–99)
INR PPP: 1.67 (ref 0.86–1.14)
POTASSIUM SERPL-SCNC: 3.9 MMOL/L (ref 3.4–5.3)
SODIUM SERPL-SCNC: 135 MMOL/L (ref 133–144)

## 2017-06-29 NOTE — PROGRESS NOTES
Here are your lab results from your recent lab visit...  -Your cholesterol results look a lot lower with the lipitor 40mg/day on board, though your LDL may have gone too low.  Lets discuss lowering your lipitor dose at your upcoming appointment (likely to ~10mg/day).    Best,   Lev Tristan MD

## 2017-06-29 NOTE — PROGRESS NOTES
Left message on patient' VM at home with BMP results from today  At OV with Dr. Rosa on 6/22. her K+ was low, and we had her take extra that day, Her Spironolactone was increased to 50 mg daily.  Today K+ 3.9 and Cr. Is good,  Will continue with current plan.

## 2017-07-05 ENCOUNTER — TELEPHONE (OUTPATIENT)
Dept: FAMILY MEDICINE | Facility: CLINIC | Age: 77
End: 2017-07-05
Payer: MEDICARE

## 2017-07-05 ENCOUNTER — OFFICE VISIT (OUTPATIENT)
Dept: FAMILY MEDICINE | Facility: CLINIC | Age: 77
End: 2017-07-05
Payer: MEDICARE

## 2017-07-05 VITALS
RESPIRATION RATE: 14 BRPM | OXYGEN SATURATION: 96 % | DIASTOLIC BLOOD PRESSURE: 60 MMHG | HEIGHT: 66 IN | SYSTOLIC BLOOD PRESSURE: 110 MMHG | HEART RATE: 76 BPM | TEMPERATURE: 98 F | WEIGHT: 201 LBS | BODY MASS INDEX: 32.3 KG/M2

## 2017-07-05 DIAGNOSIS — Z79.4 TYPE 2 DIABETES MELLITUS WITH HYPERGLYCEMIA, WITH LONG-TERM CURRENT USE OF INSULIN (H): Primary | ICD-10-CM

## 2017-07-05 DIAGNOSIS — I48.91 ATRIAL FIBRILLATION WITH CONTROLLED VENTRICULAR RESPONSE (H): ICD-10-CM

## 2017-07-05 DIAGNOSIS — M35.02 SJOGREN'S SYNDROME WITH LUNG INVOLVEMENT (H): ICD-10-CM

## 2017-07-05 DIAGNOSIS — E11.65 TYPE 2 DIABETES MELLITUS WITH HYPERGLYCEMIA, WITH LONG-TERM CURRENT USE OF INSULIN (H): Primary | ICD-10-CM

## 2017-07-05 DIAGNOSIS — Z79.01 LONG TERM CURRENT USE OF ANTICOAGULANT THERAPY: Primary | ICD-10-CM

## 2017-07-05 DIAGNOSIS — K92.2 LOWER GI BLEED: ICD-10-CM

## 2017-07-05 DIAGNOSIS — I50.9 ACUTE ON CHRONIC HEART FAILURE, UNSPECIFIED HEART FAILURE TYPE (H): ICD-10-CM

## 2017-07-05 DIAGNOSIS — L65.9 HAIR LOSS: ICD-10-CM

## 2017-07-05 DIAGNOSIS — J96.21 ACUTE AND CHRONIC RESPIRATORY FAILURE WITH HYPOXIA (H): ICD-10-CM

## 2017-07-05 DIAGNOSIS — I50.30 (HFPEF) HEART FAILURE WITH PRESERVED EJECTION FRACTION (H): ICD-10-CM

## 2017-07-05 LAB
HBA1C MFR BLD: 6.4 % (ref 4.3–6)
HGB BLD-MCNC: 12.2 G/DL (ref 11.7–15.7)
INR POINT OF CARE: 2.6 (ref 0.86–1.14)

## 2017-07-05 PROCEDURE — 80048 BASIC METABOLIC PNL TOTAL CA: CPT | Performed by: INTERNAL MEDICINE

## 2017-07-05 PROCEDURE — 85018 HEMOGLOBIN: CPT | Performed by: FAMILY MEDICINE

## 2017-07-05 PROCEDURE — 99207 C FOOT EXAM  NO CHARGE: CPT | Performed by: FAMILY MEDICINE

## 2017-07-05 PROCEDURE — 99215 OFFICE O/P EST HI 40 MIN: CPT | Mod: 25 | Performed by: FAMILY MEDICINE

## 2017-07-05 PROCEDURE — 36416 COLLJ CAPILLARY BLOOD SPEC: CPT | Performed by: FAMILY MEDICINE

## 2017-07-05 PROCEDURE — 85610 PROTHROMBIN TIME: CPT | Mod: QW | Performed by: FAMILY MEDICINE

## 2017-07-05 PROCEDURE — 83036 HEMOGLOBIN GLYCOSYLATED A1C: CPT | Performed by: FAMILY MEDICINE

## 2017-07-05 NOTE — NURSING NOTE
"Chief Complaint   Patient presents with     Diabetes      follow up     Otalgia     right side-intermittent-     Hair Loss     Recheck Medication      prednisone       Initial /60  Pulse 76  Temp 98  F (36.7  C) (Oral)  Resp 14  Ht 5' 6\" (1.676 m)  Wt 201 lb (91.2 kg)  SpO2 96%  BMI 32.44 kg/m2 Estimated body mass index is 32.44 kg/(m^2) as calculated from the following:    Height as of this encounter: 5' 6\" (1.676 m).    Weight as of this encounter: 201 lb (91.2 kg).  BP completed using cuff size: large    Health Maintenance that is potentially due pending provider review:  Health Maintenance Due   Topic Date Due     HF ACTION PLAN Q3 YR  1940     FOOT EXAM Q1 YEAR  05/08/1941     EYE EXAM Q1 YEAR  05/08/1941     TSH W/ FREE T4 REFLEX Q2 YEAR  09/24/2006     MEDICARE ANNUAL WELLNESS VISIT  08/06/2015     ADVANCE DIRECTIVE PLANNING Q5 YRS  05/10/2017     PHQ-9 Q6 MONTHS  06/30/2017           PHQ/ACT/DELORES--Gave pt questionnare and labs, eye and foot per provider  "

## 2017-07-05 NOTE — PROGRESS NOTES
SUBJECTIVE:                                                    Betty Tee is a 77 year old female who presents to clinic today for the following health issues:      Diabetes Follow-up    Patient is checking blood sugars: four times daily.    Blood sugar testing frequency justification: Uncontrolled diabetes  Results are as follows: all fasting- as high as 303, low as 119  139 (today am), yest- 168pm, 165 noon, 138 am, Mon- 200pm, 106, 147am, Sun 165 (before dinner), 127 noon, 119am, Sat- 255pm, 150noon, Fri (7/3/17)- 151pm, 153am         am -          lunchtime -          suppertime -          bedtime -     Diabetic concerns: None     Symptoms of hypoglycemia (low blood sugar): none     Paresthesias (numbness or burning in feet) or sores: Yes      Date of last diabetic eye exam: not within last yr      Amount of exercise or physical activity: 2-3 days/week for an average of 30-45 minutes (walking more, being active, walked around [place] for an hour, went to [employer] game)    Problems taking medications regularly: Yes,  problems remembering to take now has new med routine    Medication side effects: none    Diet: low salt and diabetic    Sugars shot up 6/23/17-  Was testing before supper, machine didn't work, stopped at [facility], checked and it was 303.  Called, and she was taken to the ER.  Dx UTI.  Sx's- mentioned sweats,   Rx with cipro- helped- thinks it got better, less sweats, sugars are better.  No se's on the cipro.    DM meds- Lantus- 25 units daily, metformin 2000mg/d, and novolog with meals (12 units with meals, increasing dose if glucose level >200).    Burning sx's- left lateral/anterior lower leg.  Better if she puts them up.  Some in her foot, not a lot.  Going on for weeks, not getting better or worse.    Oxygen- using it just on long walks, longer grocery trips.  Glad she kept the oxygen tanks.  Will monitor for worsening as she lowers prednisone doses.    Concerns-   --R ear ache- (blew hot air on her  pillow last night and laid ear on it).  Hurting for a couple weeks, not changing.  --Losing hair, used to have very thick hair.      Had f/u with...  Cardiology - potassium- due for recheck today. Had increased her spiron  Lasix 20mg 2x/day- still hypervolemic, so increased for a few days.  Want to maintain at 40mg 2x/day.    Increased spironolactone to 50mg/d.  Increased potassium to 40meg/d (from 20mg/d)  INR- 1.67, said she would have clinic call her.  RTC to see NP in 2 months, MD in 4 months.  Consider **Flu study    Rheum - plan was to go down on prednisone (dose upped to 10mg while in hospital- wants to get down to 5mg or lower again).  Ordered DEXA on 6/22/17- pt isn't sure if/how that was scheduled.  --Prednisone- pt plans to go down on the dose (had company for a bit, so now needs to start).  Feeling okay with the pace suggested- making sure she's doing fine every 2 wks.    Pulm     INR- low on 6/29/17-   1 1/2 full tabs x 3 days, then went back to one tab (7.5mg tabs)        Problem list and histories reviewed & adjusted, as indicated.  Additional history: as documented    Patient Active Problem List   Diagnosis     Temporomandibular joint disorder     Diverticulitis of colon     Disorder of bone and cartilage     Osteoarthritis     Alcohol abuse, in remission     Restless legs syndrome (RLS)     Major depressive disorder, recurrent episode, moderate     Essential hypertension with goal blood pressure less than 140/90     Sciatica     Advanced directives, counseling/discussion     Colouterine fistula     Atrial fibrillation with controlled ventricular response (H)     Left ventricular hypertrophy     Health Care Home     Insomnia     Joint pains     Allergic reaction caused by a drug - likely plaquenil     Breast fibroadenoma     DVT (deep venous thrombosis) (H)     Fatty liver disease, nonalcoholic     Rib pain     Neck mass     Gastroesophageal reflux disease without esophagitis     Enlarged lymph node      Sjogren's syndrome (H)     ANGELICA (obstructive sleep apnea)     ILD (interstitial lung disease) (H)     Lower GI bleed     Acute and chronic respiratory failure with hypoxia (H)     (HFpEF) heart failure with preserved ejection fraction (H)     Type 2 diabetes mellitus with hyperglycemia, with long-term current use of insulin (H)     Heart failure, chronic, with acute decompensation (H)     Hyperlipidemia LDL goal <100      Current Outpatient Prescriptions   Medication Sig Dispense Refill     potassium chloride SA (K-DUR/KLOR-CON M) 20 MEQ CR tablet Take 2 tablets (40 mEq) by mouth daily 180 tablet 3     furosemide (LASIX) 40 MG tablet 40 mg in AM, 40 mg in afternoon, 30 tablet      spironolactone (ALDACTONE) 25 MG tablet Take 2 tablets (50 mg) by mouth daily 180 tablet 3     PARoxetine (PAXIL) 20 MG tablet TAKE 1 TABLET (10mg) BY MOUTH AT BEDTIME (Patient taking differently: Take 10 mg by mouth TAKE 1 TABLET (10mg) BY MOUTH AT BEDTIME) 20 tablet 3     ferrous sulfate (IRON) 325 (65 FE) MG tablet Take 1 tablet (325 mg) by mouth daily (with breakfast) 30 tablet 3     LANTUS SOLOSTAR 100 UNIT/ML soln INJECT 25 UNTIS SUBCUTANEOUSLY EVERY DAY 15 mL 1     insulin aspart (NOVOLOG FLEXPEN) 100 UNIT/ML injection Inject 12 Units Subcutaneous 3 times daily (with meals) 45 mL 0     lisinopril (PRINIVIL/ZESTRIL) 2.5 MG tablet Take 1 tablet (2.5 mg) by mouth daily 90 tablet 1     atorvastatin (LIPITOR) 40 MG tablet Take 1 tablet (40 mg) by mouth daily 90 tablet 1     pantoprazole (PROTONIX) 40 MG EC tablet Take 1 tablet (40 mg) by mouth daily 90 tablet 1     blood glucose monitoring (NO BRAND SPECIFIED) test strip Use to test blood sugars 4 times daily or as directed 300 strip 3     blood glucose monitoring (ACCU-CHEK MULTICLIX) lancets Use to test blood sugar 4 times daily or as directed. 4 Box 3     insulin pen needle (B-D U/F) 31G X 8 MM Use 4 times daily (with Lantus and Novolog) or as directed. 400 each 3     insulin pen  needle (BD JANE U/F) 32G X 4 MM Use 4 daily as directed. 200 each 11     blood glucose monitoring (ACCU-CHEK SHANNON PLUS) test strip Use to test blood sugar 4 times daily or as directed.  Ok to substitute alternative if insurance prefers. 120 strip 11     blood glucose monitoring (ACCU-CHEK FASTCLIX) lancets Use to test blood sugar 4 times daily or as directed.  Ok to substitute alternative if insurance prefers. 1 Box 11     predniSONE (DELTASONE) 10 MG tablet Take 2 tablets for three days (4/12-14), then take 1 tablet daily (10 mg daily starting 4/15) 90 tablet 3     metFORMIN (GLUCOPHAGE-XR) 500 MG 24 hr tablet Take 2 tablets (1,000 mg) by mouth daily (with dinner) 180 tablet 0     order for DME Oxygen: Patient requires supplemental Oxygen 4 LPM via nasal canula with activity. Please provide patient with POC to improve mobility, okay to titrate for conserving to keep stats above 90%. Please fax results to [phone].  Oxygen will be for a lifetime. 1 Device 0     ketoconazole (NIZORAL) 2 % cream Apply topically 2 times daily 60 g 1     metoprolol (TOPROL-XL) 100 MG 24 hr tablet Take 1 tablet (100 mg) by mouth daily 90 tablet 3     albuterol (PROAIR HFA, PROVENTIL HFA, VENTOLIN HFA) 108 (90 BASE) MCG/ACT inhaler Inhale 2 puffs into the lungs every 6 hours 1 Inhaler 3     order for DME Oxygen: Patient requires supplemental Oxygen 4 LPM via nasal canula with activity. Please provide patient with a home unit and with portability capability. Oxygen will be for a lifetime. 1 Device 0     warfarin (COUMADIN) 7.5 MG tablet Current dose is 7.5 mg daily.  Dose adjusted per INR result. 100 tablet 3     acyclovir (ZOVIRAX) 5 % ointment Apply topically 6 times daily (Patient taking differently: Apply topically 6 times daily As needed for outbreaks) 15 g 3     fluticasone (FLOVENT HFA) 220 MCG/ACT inhaler Inhale 2 puffs into the lungs 2 times daily 3 Inhaler 3     acyclovir (ZOVIRAX) 400 MG tablet Take 400 mg by mouth See  Admin Instructions 5 times daily as needed for outbreaks       fluticasone (FLONASE) 50 MCG/ACT nasal spray Spray 1-2 sprays into both nostrils daily (Patient taking differently: Spray 1-2 sprays into both nostrils daily as needed for allergies ) 16 g 5     COMPRESSION STOCKINGS Wear compression stockings in affected leg (right leg) or both legs most of the time during the day and take them off at night. 2 each 2     acetaminophen (TYLENOL) 500 MG tablet Take 500 mg by mouth nightly as needed        furosemide (LASIX) 40 MG tablet Take 40 mg twice, daily on Fri. Sat, Sun 180 tablet 3     predniSONE (DELTASONE) 1 MG tablet Take 1 tablet (1 mg) by mouth daily (Patient not taking: Reported on 7/5/2017) 30 tablet 3     traZODone (DESYREL) 50 MG tablet TAKE 1/2-1 TABLET BY MOUTH NIGHTLY AS NEEDED, CAN TITRATE DOWN TO 1/2TAB OR UP TO 2TABS AS NEEDED (Patient not taking: Reported on 7/5/2017) 90 tablet 1     lactobacillus rhamnosus, GG, (CULTURELL) capsule Take 1 capsule by mouth 3 times daily (before meals) (Patient not taking: Reported on 7/5/2017) 90 capsule 1     pantoprazole (PROTONIX) 20 MG EC tablet Take 1-2 tablets (20-40 mg) by mouth daily (Patient not taking: Reported on 7/5/2017) 60 tablet 1     montelukast (SINGULAIR) 10 MG tablet Take 1 tablet (10 mg) by mouth At Bedtime (Patient not taking: Reported on 7/5/2017) 90 tablet 1     polyethylene glycol (MIRALAX/GLYCOLAX) packet Take 17 g by mouth daily as needed for constipation (Patient not taking: Reported on 7/5/2017) 7 packet 0     Allergies   Allergen Reactions     Augmentin Nausea and Vomiting     Codeine Nausea and Vomiting     Phenobarbital Itching     Recent Labs   Lab Test  07/05/17   1233  06/29/17   1258  06/23/17   2256  06/22/17   1700   04/04/17   0650   04/02/17   2147  01/24/17   1338   11/03/16   1136  09/20/16   1045   07/19/16   1254   08/06/14   1116   A1C  6.4*   --    --    --    --   14.3*   --    --   9.4*   --    --    --    < >   --     "--    --    LDL   --    --    --   13   --    --    --    --    --    --    --   56   --    --    --   59   HDL   --    --    --   62   --    --    --    --    --    --    --   72   --    --    --   57   TRIG   --    --    --   132   --    --    --    --    --    --    --   123   --    --    --   129   ALT   --    --    --    --    --    --    --   19   --    --   21   --    --   51*   < >  52*   CR   --   0.84  1.00  0.80   < >   --    < >  0.58  0.81   < >  1.02   --    < >  0.84   < >  0.82   GFRESTIMATED   --   66  54*  69   < >   --    < >  >90  Non  GFR Calc    68   < >  53*   --    < >  66   < >  68   GFRESTBLACK   --   80  65  84   < >   --    < >  >90   GFR Calc    83   < >  64   --    < >  79   < >  83   POTASSIUM   --   3.9  4.3  3.0*   < >   --    < >  3.3*  3.5   < >  3.6   --    < >  4.1   < >  4.1    < > = values in this interval not displayed.      BP Readings from Last 3 Encounters:   07/05/17 110/60   06/24/17 (!) 137/95   06/22/17 113/74    Wt Readings from Last 3 Encounters:   07/05/17 201 lb (91.2 kg)   06/23/17 201 lb (91.2 kg)   06/22/17 201 lb (91.2 kg)                  Labs reviewed in EPIC    Reviewed and updated as needed this visit by clinical staff  Tobacco  Allergies  Meds  Problems  Med Hx  Surg Hx  Fam Hx  Soc Hx        Reviewed and updated as needed this visit by Provider  Allergies  Meds  Problems         ROS:  Constitutional, HEENT, cardiovascular, pulmonary, GI, , musculoskeletal, neuro, skin, endocrine and psych systems are negative, except as otherwise noted.    OBJECTIVE:     /60  Pulse 76  Temp 98  F (36.7  C) (Oral)  Resp 14  Ht 5' 6\" (1.676 m)  Wt 201 lb (91.2 kg)  SpO2 96%  BMI 32.44 kg/m2  Body mass index is 32.44 kg/(m^2).  GENERAL APPEARANCE: healthy, alert and no distress -slow to get up to table, but otherwise looks brighter     EYES: PERRL, sclera clear     HENT: nose and mouth without ulcers or lesions     " NECK: no adenopathy, no asymmetry, masses, or scars and thyroid normal to palpation, no JVD or carotid bruits     RESP: lungs clear to auscultation - no rales, rhonchi or wheezes     CV: regular rates and rhythm, normal S1 S2, no S3 or S4 and no murmur, click or rub      Abdomen: soft, nontender, no HSM or masses and bowel sounds normal     Ext: warm, dry, no edema      Psych: full range affect, normal speech and grooming, judgement and insight intact       ASSESSMENT/PLAN:       ICD-10-CM    1. Type 2 diabetes mellitus with hyperglycemia, with long-term current use of insulin (H) E11.65 FOOT EXAM    Z79.4 Hemoglobin A1c   2. Acute on chronic heart failure, unspecified heart failure type (H) I50.9 Basic metabolic panel   3. Atrial fibrillation with controlled ventricular response (H) I48.91 INR point of care   4. (HFpEF) heart failure with preserved ejection fraction (H) I50.30 Basic metabolic panel   5. Acute and chronic respiratory failure with hypoxia (H) J96.21 Basic metabolic panel   6. Sjogren's syndrome with lung involvement (H) M35.02    7. Lower GI bleed K92.2 Hemoglobin   8. Hair loss L65.9 DERMATOLOGY REFERRAL     DM- will check A1C today- bit early, but already down to 6.4 (from 14's! In hospital a few months ago), so looks great.    HFpEF, a.fib, Sjogrens, Acute/chronic respiratory failure-- f/u recent consultations  --In hindsight, suspect her improved hypoxia sx's s/p hospitalization was due to the increase in prednisone.    Pt had much less need for oxygen with activity after hospitalization than before- was treated with abx, fluids, DM cares, and incr prednisone.  Rheum directing lowering prednisone dose- goal is 5mg/d or lower, currently still at 10mg/d, but plans to start slow wean now that guests have gone home.  Pt will closely monitor sx's and f/u with rheumatology.  --She is also on higher doses of diuretics, which is likely helping, but her breathing improved before those increases.  Needs  BMP recheck through cardiology today based on those changes.  --Cont f/u with specialists as advised- Pulmonary, Cardiology and Rheumatology.  Appreciate their care.  --Today, reviewed medication changes and consult note recs, and she is understanding and following them for the most part.  --After discussion, she did realize she missed her DEXA appt- will call to reschedule.  --INR- today- unable to see directions from INR from cardiology last time, but pt did increase dose as noted above.    Other concerns-  -R ear pain- normal on exam, rtc if worsening.  -Losing hair- could be part of other chronic issues or medications or other etiology- referred to derm.  -f/u lower GI bleed- recheck hgb today- has been improving.  -Feeling bit better after ER visit for high glucose (~300)- found UTI on UA.  Mild sx's prior seem to be better, and glucose readings are better since txt with cipro.  -left lower leg burning sensation- rtc if worsening.    Forgot to discuss recent lipid results- LDL too low- should back off on lipitor dose (likely from ~40mg to 10mg/d).  Will call to discuss with lab results.    RTC in 3 months, with low threshold for sooner f/u if needed.    Ilene Tristan MD  [facility]

## 2017-07-05 NOTE — TELEPHONE ENCOUNTER
Reason for Call: Returning Phone Call     Detailed comments: Betty Tee is returning phone call from Radha about clarifying her Warfarin or Coumadin dosage.     Phone Number Patient can be reached at: Home number on file [phone] (home)    Best Time: ASAP     Can we leave a detailed message on this number? YES    Call taken on 7/5/2017 at 4:04 PM by Nelia Hahn

## 2017-07-05 NOTE — TELEPHONE ENCOUNTER
Patient confirmed she took 11.25mg for 2 days, then has been taking 7.5mg daily  Asked INR nurse for orders:    ANTICOAGULATION FOLLOW-UP CLINIC VISIT    Patient Name:  Betty Tee  Date:  7/5/2017  Contact Type:  Telephone    SUBJECTIVE:  Bleeding Signs/Symptoms: None  Thromboembolic Signs/Symptoms: None    Medication Changes:  No  Dietary Changes:  No  Bacterial/Viral Infection:  No    Missed Coumadin Doses:  None  Other Concerns:  No      ASSESSMENT/PLAN:  See: ANTICOAGULATION QIC flow sheet.    INR 2.6  Per B RUTH Hemphill nurse, pt to continue on 7.5mg daily  Recheck INR in 2 weeks  Patient informed and verbalized understanding  Lydia HALEY RN    Dosage adjustment made based on physician directed care plan.    JIMI VOGT

## 2017-07-05 NOTE — TELEPHONE ENCOUNTER
Need to clarify coumadin dose.  Was written by pt as 7.5.  Per orange sheet dose was different.  Left message at [phone] for pt to call back.  Radha Otero RN

## 2017-07-05 NOTE — MR AVS SNAPSHOT
After Visit Summary   7/5/2017    Betty Tee    MRN: [id number]           Patient Information     Date Of Birth          1940        Visit Information        Provider Department      7/5/2017 11:00 AM Ilene Tristan MD [facility]        Today's Diagnoses     Type 2 diabetes mellitus with hyperglycemia, with long-term current use of insulin (H)    -  1    Acute on chronic heart failure, unspecified heart failure type (H)        Atrial fibrillation with controlled ventricular response (H)        (HFpEF) heart failure with preserved ejection fraction (H)        Acute and chronic respiratory failure with hypoxia (H)        Sjogren's syndrome with lung involvement (H)        Lower GI bleed        Hair loss           Follow-ups after your visit        Additional Services     DERMATOLOGY REFERRAL       Your provider has referred you to: [facility]: Dermatology Clinic [facility] [phone]   [email or web address]    Please be aware that coverage of these services is subject to the terms and limitations of your health insurance plan.  Call member services at your health plan with any benefit or coverage questions.      Please bring the following with you to your appointment:    (1) Any X-Rays, CTs or MRIs which have been performed.  Contact the facility where they were done to arrange for  prior to your scheduled appointment.    (2) List of current medications  (3) This referral request   (4) Any documents/labs given to you for this referral                  Your next 10 appointments already scheduled     Aug 24, 2017 12:30 PM CDT   Lab with [facility] ([facility])    [address] [place] [id number] [phone]            Aug 24, 2017  1:00 PM CDT   (Arrive by 12:45 PM)   CORE NEW with LIZY Arora [facility] ([facility])     [address] [address]   [phone]            Oct 04, 2017 11:00 AM CDT   (Arrive by 10:45 AM)   Return Interstitial Lung with Meño Walsh MD   [facility] ([facility])    [address] [place] [id number] [phone]            Oct 06, 2017  1:00 PM CDT   Office Visit with Ilene Tristan MD   [facility] ([facility])    [address] [facility] [id number] [phone]           Bring a current list of meds and any records pertaining to this visit.  For Physicals, please bring immunization records and any forms needing to be filled out.  Please arrive 10 minutes early to complete paperwork.            Oct 26, 2017  2:30 PM CDT   Lab with  LAB   [facility] ([facility])    [address] [place] [id number] [phone]            Oct 26, 2017  3:00 PM CDT   (Arrive by 2:45 PM)   RETURN HEART FAILURE with Radha Rosa MD   [facility] ([facility])    [address] [place] [id number] [phone]              Who to contact     If you have questions or need follow up information about today's clinic visit or your schedule please contact [facility] directly at [phone].  Normal or non-critical lab and imaging results will be communicated to you by MyChart, letter or phone within 4 business days after the clinic has received the results. If you do not hear from us within 7 days, please contact the clinic through MyChart or phone. If you have a critical or abnormal lab result, we will notify you by phone as soon as possible.  Submit refill requests through [employer] or call your pharmacy and they will forward the refill request to us. Please allow 3 business days for your refill to be completed.           "Additional Information About Your Visit        MyChart Information     [employer] gives you secure access to your electronic health record. If you see a primary care provider, you can also send messages to your care team and make appointments. If you have questions, please call your primary care clinic.  If you do not have a primary care provider, please call [phone] and they will assist you.        Care EveryWhere ID     This is your Care EveryWhere ID. This could be used by other organizations to access your [place] medical records  [id number]        Your Vitals Were     Pulse Temperature Respirations Height Pulse Oximetry BMI (Body Mass Index)    76 98  F (36.7  C) (Oral) 14 5' 6\" (1.676 m) 96% 32.44 kg/m2       Blood Pressure from Last 3 Encounters:   07/05/17 110/60   06/24/17 (!) 137/95   06/22/17 113/74    Weight from Last 3 Encounters:   07/05/17 201 lb (91.2 kg)   06/23/17 201 lb (91.2 kg)   06/22/17 201 lb (91.2 kg)              We Performed the Following     Basic metabolic panel     DERMATOLOGY REFERRAL     FOOT EXAM     Hemoglobin A1c     Hemoglobin     INR point of care          Today's Medication Changes          These changes are accurate as of: 7/5/17 11:59 PM.  If you have any questions, ask your nurse or doctor.               These medicines have changed or have updated prescriptions.        Dose/Directions    acyclovir 5 % ointment   Commonly known as:  ZOVIRAX   This may have changed:  additional instructions   Used for:  Recurrent cold sores        Apply topically 6 times daily   Quantity:  15 g   Refills:  3       fluticasone 50 MCG/ACT spray   Commonly known as:  FLONASE   This may have changed:    - when to take this  - reasons to take this   Used for:  Seasonal allergic rhinitis        Dose:  1-2 spray   Spray 1-2 sprays into both nostrils daily   Quantity:  16 g   Refills:  5       PARoxetine 20 MG tablet   Commonly known as:  PAXIL   This may have changed:    - how much to take  - " how to take this  - additional instructions   Used for:  Major depressive disorder, recurrent episode, moderate (H)        TAKE 1 TABLET (10mg) BY MOUTH AT BEDTIME   Quantity:  20 tablet   Refills:  3                Primary Care Provider Office Phone # Fax #    Ilene Tristan -[phone]       [facility] [address]  [facility] [address]        Equal Access to Services     GENNY STONER : Hadii aad ku hadasho Soomaali, waaxda luqadaha, qaybta kaalmada adeegyada, waxay idiin hayaan adeeg khkatherinsh laclaudiabethany . So [facility] [phone].    ATENCIÓN: Si habla español, tiene a conti disposición servicios gratuitos de asistencia lingüística. Llame al [phone].    We comply with applicable federal civil rights laws and [place] laws. We do not discriminate on the basis of race, color, national origin, age, disability sex, sexual orientation or gender identity.            Thank you!     Thank you for choosing [facility]  for your care. Our goal is always to provide you with excellent care. Hearing back from our patients is one way we can continue to improve our services. Please take a few minutes to complete the written survey that you may receive in the mail after your visit with us. Thank you!             Your Updated Medication List - Protect others around you: Learn how to safely use, store and throw away your medicines at www.disposemymeds.org.          This list is accurate as of: 7/5/17 11:59 PM.  Always use your most recent med list.                   Brand Name Dispense Instructions for use Diagnosis    acyclovir 400 MG tablet    ZOVIRAX     Take 400 mg by mouth See Admin Instructions 5 times daily as needed for outbreaks        acyclovir 5 % ointment    ZOVIRAX    15 g    Apply topically 6 times daily    Recurrent cold sores       albuterol 108 (90 BASE) MCG/ACT Inhaler    PROAIR HFA/PROVENTIL HFA/VENTOLIN HFA    1 Inhaler    Inhale 2 puffs into the lungs  every 6 hours    ILD (interstitial lung disease) (H)       atorvastatin 40 MG tablet    LIPITOR    90 tablet    Take 1 tablet (40 mg) by mouth daily    Type 2 diabetes mellitus with other specified complication (H)       * blood glucose monitoring lancets     4 Box    Use to test blood sugar 4 times daily or as directed.    Type 2 diabetes mellitus without complication, without long-term current use of insulin (H)       * blood glucose monitoring lancets     1 Box    Use to test blood sugar 4 times daily or as directed.  Ok to substitute alternative if insurance prefers.    Type 2 diabetes mellitus without complication, without long-term current use of insulin (H)       * blood glucose monitoring test strip    no brand specified    300 strip    Use to test blood sugars 4 times daily or as directed    Type 2 diabetes mellitus without complication, without long-term current use of insulin (H)       * blood glucose monitoring test strip    ACCU-CHEK SHANNON PLUS    120 strip    Use to test blood sugar 4 times daily or as directed.  Ok to substitute alternative if insurance prefers.    Type 2 diabetes mellitus without complication, without long-term current use of insulin (H)       COMPRESSION STOCKINGS     2 each    Wear compression stockings in affected leg (right leg) or both legs most of the time during the day and take them off at night.    DVT (deep venous thrombosis), right, Postphlebitic syndrome, Chronic anticoagulation, Atrial fibrillation (H)       ferrous sulfate 325 (65 FE) MG tablet    IRON    30 tablet    Take 1 tablet (325 mg) by mouth daily (with breakfast)    Iron deficiency anemia due to chronic blood loss       fluticasone 220 MCG/ACT Inhaler    FLOVENT HFA    3 Inhaler    Inhale 2 puffs into the lungs 2 times daily    ILD (interstitial lung disease) (H), Follicular bronchiolitis (H)       fluticasone 50 MCG/ACT spray    FLONASE    16 g    Spray 1-2 sprays into both nostrils daily    Seasonal allergic  rhinitis       * furosemide 40 MG tablet    LASIX    30 tablet    40 mg in AM, 40 mg in afternoon,        * furosemide 40 MG tablet    LASIX    180 tablet    Take 40 mg twice, daily on Fri. Sat, Sun    Acute edema of lung (H)       insulin aspart 100 UNIT/ML injection    NovoLOG FLEXPEN    45 mL    Inject 12 Units Subcutaneous 3 times daily (with meals)    Type 2 diabetes mellitus with other specified complication (H)       * insulin pen needle 31G X 8 MM    B-D U/F    400 each    Use 4 times daily (with Lantus and Novolog) or as directed.    Type 2 diabetes mellitus without complication, without long-term current use of insulin (H)       * insulin pen needle 32G X 4 MM    BD JANE U/F    200 each    Use 4 daily as directed.    Type 2 diabetes mellitus without complication, without long-term current use of insulin (H)       ketoconazole 2 % cream    NIZORAL    60 g    Apply topically 2 times daily    Cutaneous candidiasis       lactobacillus rhamnosus (GG) capsule     90 capsule    Take 1 capsule by mouth 3 times daily (before meals)    Pneumonia due to infectious organism, unspecified laterality, unspecified part of lung       LANTUS SOLOSTAR 100 UNIT/ML injection   Generic drug:  insulin glargine     15 mL    INJECT 25 UNTIS SUBCUTANEOUSLY EVERY DAY    Type 2 diabetes mellitus with hyperglycemia, with long-term current use of insulin (H)       lisinopril 2.5 MG tablet    PRINIVIL/Zestril    90 tablet    Take 1 tablet (2.5 mg) by mouth daily    Essential hypertension with goal blood pressure less than 140/90       metFORMIN 500 MG 24 hr tablet    GLUCOPHAGE-XR    180 tablet    Take 2 tablets (1,000 mg) by mouth daily (with dinner)    Type 2 diabetes mellitus without complication, without long-term current use of insulin (H)       metoprolol 100 MG 24 hr tablet    TOPROL-XL    90 tablet    Take 1 tablet (100 mg) by mouth daily    Atrial fibrillation with controlled ventricular response (H)       montelukast 10 MG  tablet    SINGULAIR    90 tablet    Take 1 tablet (10 mg) by mouth At Bedtime    Sjogren's syndrome (H), ILD (interstitial lung disease) (H)       * order for DME     1 Device    Oxygen: Patient requires supplemental Oxygen 4 LPM via nasal canula with activity. Please provide patient with a home unit and with portability capability. Oxygen will be for a lifetime.    Hypoxia, ILD (interstitial lung disease) (H)       * order for DME     1 Device    Oxygen: Patient requires supplemental Oxygen 4 LPM via nasal canula with activity. Please provide patient with POC to improve mobility, okay to titrate for conserving to keep stats above 90%. Please fax results to [phone].  Oxygen will be for a lifetime.    Hypoxia, ILD (interstitial lung disease) (H)       * pantoprazole 20 MG EC tablet    PROTONIX    60 tablet    Take 1-2 tablets (20-40 mg) by mouth daily    Gastroesophageal reflux disease without esophagitis       * pantoprazole 40 MG EC tablet    PROTONIX    90 tablet    Take 1 tablet (40 mg) by mouth daily    Gastroesophageal reflux disease without esophagitis       PARoxetine 20 MG tablet    PAXIL    20 tablet    TAKE 1 TABLET (10mg) BY MOUTH AT BEDTIME    Major depressive disorder, recurrent episode, moderate (H)       polyethylene glycol Packet    MIRALAX/GLYCOLAX    7 packet    Take 17 g by mouth daily as needed for constipation    Constipation, unspecified constipation type       potassium chloride SA 20 MEQ CR tablet    K-DUR/KLOR-CON M    180 tablet    Take 2 tablets (40 mEq) by mouth daily    Acute on chronic heart failure, unspecified heart failure type (H), Acute and chronic respiratory failure with hypoxia (H), (HFpEF) heart failure with preserved ejection fraction (H)       * predniSONE 10 MG tablet    DELTASONE    90 tablet    Take 2 tablets for three days (4/12-14), then take 1 tablet daily (10 mg daily starting 4/15)    ILD (interstitial lung disease) (H), Sjogren's syndrome (H)       * predniSONE  1 MG tablet    DELTASONE    30 tablet    Take 1 tablet (1 mg) by mouth daily    ILD (interstitial lung disease) (H)       spironolactone 25 MG tablet    ALDACTONE    180 tablet    Take 2 tablets (50 mg) by mouth daily    Chronic diastolic congestive heart failure (H)       traZODone 50 MG tablet    DESYREL    90 tablet    TAKE 1/2-1 TABLET BY MOUTH NIGHTLY AS NEEDED, CAN TITRATE DOWN TO 1/2TAB OR UP TO 2TABS AS NEEDED    Insomnia due to medical condition       TYLENOL 500 MG tablet   Generic drug:  acetaminophen      Take 500 mg by mouth nightly as needed    Dizziness       warfarin 7.5 MG tablet    COUMADIN    100 tablet    Current dose is 7.5 mg daily.  Dose adjusted per INR result.    Atrial fibrillation with controlled ventricular response (H)       * Notice:  This list has 14 medication(s) that are the same as other medications prescribed for you. Read the directions carefully, and ask your doctor or other care provider to review them with you.

## 2017-07-06 DIAGNOSIS — I48.91 ATRIAL FIBRILLATION WITH CONTROLLED VENTRICULAR RESPONSE (H): ICD-10-CM

## 2017-07-06 LAB
ANION GAP SERPL CALCULATED.3IONS-SCNC: 10 MMOL/L (ref 3–14)
BUN SERPL-MCNC: 18 MG/DL (ref 7–30)
CALCIUM SERPL-MCNC: 8.6 MG/DL (ref 8.5–10.1)
CHLORIDE SERPL-SCNC: 103 MMOL/L (ref 94–109)
CO2 SERPL-SCNC: 27 MMOL/L (ref 20–32)
CREAT SERPL-MCNC: 0.82 MG/DL (ref 0.52–1.04)
GFR SERPL CREATININE-BSD FRML MDRD: 68 ML/MIN/1.7M2
GLUCOSE SERPL-MCNC: 101 MG/DL (ref 70–99)
POTASSIUM SERPL-SCNC: 3.7 MMOL/L (ref 3.4–5.3)
SODIUM SERPL-SCNC: 140 MMOL/L (ref 133–144)

## 2017-07-06 ASSESSMENT — PATIENT HEALTH QUESTIONNAIRE - PHQ9: SUM OF ALL RESPONSES TO PHQ QUESTIONS 1-9: 5

## 2017-07-06 NOTE — TELEPHONE ENCOUNTER
Warfarin 7.5mg tablet      Last Written Prescription Date: 6/03/2016  Last Fill Qty: 100, # refills: 0  Last Office Visit with G, P or [facility] prescribing provider: 7/05/2017       Date and Result of Last PT/INR:   Lab Results   Component Value Date    INR 2.6 07/05/2017    INR 1.67 06/29/2017    INR 1.64 06/22/2017    PT 25.5 08/11/2014    PT 29.9 07/26/2013

## 2017-07-07 RX ORDER — WARFARIN SODIUM 7.5 MG/1
TABLET ORAL
Qty: 100 TABLET | Refills: 0 | Status: SHIPPED | OUTPATIENT
Start: 2017-07-07 | End: 2017-12-18

## 2017-07-08 ASSESSMENT — ENCOUNTER SYMPTOMS
HEMATURIA: 0
VOMITING: 0
NAUSEA: 0
FLANK PAIN: 0
FEVER: 0
ABDOMINAL PAIN: 0
SHORTNESS OF BREATH: 0

## 2017-07-08 NOTE — PROGRESS NOTES
Here are your lab results from your recent visit...  -Wow!  Your hemoglobin A1C (the three month average of your glucose levels) came back in a great range at 6.4 (down from 14.3 in the hospital).  This means that you are doing a great job with your diet and medication tracking.  We don't really want you to go lower than this level.  I would continue to work on your diet, and we may be able to back off a bit on your insulin regimen if you keep this up!  -In other good news, the hemoglobin also is finally back in the normal range.      Please let me know if you have any questions.  Best,   Lev Tristan MD

## 2017-07-10 DIAGNOSIS — M35.00 SJOGREN'S SYNDROME (H): ICD-10-CM

## 2017-07-10 DIAGNOSIS — J84.9 ILD (INTERSTITIAL LUNG DISEASE) (H): ICD-10-CM

## 2017-07-10 NOTE — TELEPHONE ENCOUNTER
Singulair        Last Written Prescription Date: 10/28/2016  Last Fill Quantity: 90, # refills: 1    Last Office Visit with G, P or [facility] prescribing provider:  07/05/2017   Future Office Visit:    Next 5 appointments (look out 90 days)     Oct 06, 2017  1:00 PM CDT   Office Visit with Ilene Tristan MD   [facility] ([facility])    [address] [facility] [id number] [phone]                   Date of Last Asthma Action Plan Letter:   There are no preventive care reminders to display for this patient.   Asthma Control Test: No flowsheet data found.    Date of Last Spirometry Test:   No results found. However, due to the size of the patient record, not all encounters were searched. Please check Results Review for a complete set of results.

## 2017-07-11 NOTE — TELEPHONE ENCOUNTER
CW  Routing refill request to provider for review/approval because:  Drug not on the [facility] refill protocol for Dx:   Sjogren's syndrome (H) [M35.00]  - Primary       ILD (interstitial lung disease) (H) [J84.9]         Please authorize if appropriate.  Thanks, Sophia Hemphill RN

## 2017-07-12 RX ORDER — MONTELUKAST SODIUM 10 MG/1
TABLET ORAL
Start: 2017-07-12

## 2017-07-12 NOTE — TELEPHONE ENCOUNTER
Looks like rx came from pulmonary- please have pharmacy send request to filling provider.  Thanks- CW

## 2017-07-12 NOTE — TELEPHONE ENCOUNTER
Denied with note to pharmacy: Please send refill request to patient's pulmonary provider. Thanks.  Lydia HALEY RN

## 2017-07-29 ENCOUNTER — NURSE TRIAGE (OUTPATIENT)
Dept: NURSING | Facility: CLINIC | Age: 77
End: 2017-07-29

## 2017-07-29 NOTE — TELEPHONE ENCOUNTER
Caller reports  2 nights of night sweats that she has not previously experienced. Multiple health problems; Reports med compliance and no other new symptoms.    Advised to be seen by PCP and transferred to ;    Advised to call or go to ED if any other new or worsening symptoms.  Reason for Disposition    [1] NIGHT SWEATS occur (e.g., drenching sweat that occurs at night and has to change bed clothes or bed sheets) AND [2] cause unknown    Protocols used: SWEATING-ADULT-  Raven Aponte RN  [facility]

## 2017-08-01 ENCOUNTER — TELEPHONE (OUTPATIENT)
Dept: FAMILY MEDICINE | Facility: CLINIC | Age: 77
End: 2017-08-01

## 2017-08-01 ENCOUNTER — OFFICE VISIT (OUTPATIENT)
Dept: FAMILY MEDICINE | Facility: CLINIC | Age: 77
End: 2017-08-01
Payer: MEDICARE

## 2017-08-01 VITALS
HEIGHT: 66 IN | DIASTOLIC BLOOD PRESSURE: 78 MMHG | BODY MASS INDEX: 32.95 KG/M2 | WEIGHT: 205 LBS | RESPIRATION RATE: 16 BRPM | OXYGEN SATURATION: 96 % | HEART RATE: 78 BPM | SYSTOLIC BLOOD PRESSURE: 128 MMHG | TEMPERATURE: 98.4 F

## 2017-08-01 DIAGNOSIS — M35.02 SJOGREN'S SYNDROME WITH LUNG INVOLVEMENT (H): ICD-10-CM

## 2017-08-01 DIAGNOSIS — I51.7 LEFT VENTRICULAR HYPERTROPHY: ICD-10-CM

## 2017-08-01 DIAGNOSIS — J84.9 ILD (INTERSTITIAL LUNG DISEASE) (H): ICD-10-CM

## 2017-08-01 DIAGNOSIS — M35.09 SJOGREN'S SYNDROME WITH OTHER ORGAN INVOLVEMENT (H): ICD-10-CM

## 2017-08-01 DIAGNOSIS — J30.2 CHRONIC SEASONAL ALLERGIC RHINITIS DUE TO OTHER ALLERGEN: ICD-10-CM

## 2017-08-01 DIAGNOSIS — K21.9 GASTROESOPHAGEAL REFLUX DISEASE WITHOUT ESOPHAGITIS: ICD-10-CM

## 2017-08-01 DIAGNOSIS — I48.91 ATRIAL FIBRILLATION WITH CONTROLLED VENTRICULAR RESPONSE (H): ICD-10-CM

## 2017-08-01 DIAGNOSIS — E11.65 TYPE 2 DIABETES MELLITUS WITH HYPERGLYCEMIA, WITH LONG-TERM CURRENT USE OF INSULIN (H): ICD-10-CM

## 2017-08-01 DIAGNOSIS — I10 ESSENTIAL HYPERTENSION WITH GOAL BLOOD PRESSURE LESS THAN 140/90: ICD-10-CM

## 2017-08-01 DIAGNOSIS — Z79.4 TYPE 2 DIABETES MELLITUS WITH HYPERGLYCEMIA, WITH LONG-TERM CURRENT USE OF INSULIN (H): ICD-10-CM

## 2017-08-01 DIAGNOSIS — F33.1 MAJOR DEPRESSIVE DISORDER, RECURRENT EPISODE, MODERATE (H): ICD-10-CM

## 2017-08-01 DIAGNOSIS — N30.00 ACUTE CYSTITIS WITHOUT HEMATURIA: ICD-10-CM

## 2017-08-01 DIAGNOSIS — R61 NIGHT SWEATS: Primary | ICD-10-CM

## 2017-08-01 LAB
ALBUMIN UR-MCNC: NEGATIVE MG/DL
APPEARANCE UR: CLEAR
BACTERIA #/AREA URNS HPF: ABNORMAL /HPF
BASOPHILS # BLD AUTO: 0.1 10E9/L (ref 0–0.2)
BASOPHILS NFR BLD AUTO: 0.5 %
BILIRUB UR QL STRIP: NEGATIVE
COLOR UR AUTO: YELLOW
DIFFERENTIAL METHOD BLD: ABNORMAL
EOSINOPHIL # BLD AUTO: 0.5 10E9/L (ref 0–0.7)
EOSINOPHIL NFR BLD AUTO: 3.9 %
ERYTHROCYTE [DISTWIDTH] IN BLOOD BY AUTOMATED COUNT: 25.1 % (ref 10–15)
ERYTHROCYTE [SEDIMENTATION RATE] IN BLOOD BY WESTERGREN METHOD: 18 MM/H (ref 0–30)
GLUCOSE UR STRIP-MCNC: NEGATIVE MG/DL
HCT VFR BLD AUTO: 41.3 % (ref 35–47)
HGB BLD-MCNC: 12.8 G/DL (ref 11.7–15.7)
HGB UR QL STRIP: ABNORMAL
HYALINE CASTS #/AREA URNS LPF: ABNORMAL /LPF (ref 0–2)
INR POINT OF CARE: 2 (ref 0.86–1.14)
KETONES UR STRIP-MCNC: NEGATIVE MG/DL
LEUKOCYTE ESTERASE UR QL STRIP: ABNORMAL
LYMPHOCYTES # BLD AUTO: 1.4 10E9/L (ref 0.8–5.3)
LYMPHOCYTES NFR BLD AUTO: 11.6 %
MCH RBC QN AUTO: 26.4 PG (ref 26.5–33)
MCHC RBC AUTO-ENTMCNC: 31 G/DL (ref 31.5–36.5)
MCV RBC AUTO: 85 FL (ref 78–100)
MONOCYTES # BLD AUTO: 1.2 10E9/L (ref 0–1.3)
MONOCYTES NFR BLD AUTO: 9.8 %
NEUTROPHILS # BLD AUTO: 8.9 10E9/L (ref 1.6–8.3)
NEUTROPHILS NFR BLD AUTO: 74.2 %
NITRATE UR QL: NEGATIVE
NON-SQ EPI CELLS #/AREA URNS LPF: ABNORMAL /LPF
PH UR STRIP: 6 PH (ref 5–7)
PLATELET # BLD AUTO: 231 10E9/L (ref 150–450)
RBC # BLD AUTO: 4.84 10E12/L (ref 3.8–5.2)
RBC #/AREA URNS AUTO: ABNORMAL /HPF (ref 0–2)
SP GR UR STRIP: 1.01 (ref 1–1.03)
URN SPEC COLLECT METH UR: ABNORMAL
UROBILINOGEN UR STRIP-ACNC: 0.2 EU/DL (ref 0.2–1)
WBC # BLD AUTO: 12 10E9/L (ref 4–11)
WBC #/AREA URNS AUTO: ABNORMAL /HPF (ref 0–2)

## 2017-08-01 PROCEDURE — 81001 URINALYSIS AUTO W/SCOPE: CPT | Performed by: FAMILY MEDICINE

## 2017-08-01 PROCEDURE — 99214 OFFICE O/P EST MOD 30 MIN: CPT | Performed by: FAMILY MEDICINE

## 2017-08-01 PROCEDURE — 87086 URINE CULTURE/COLONY COUNT: CPT | Performed by: FAMILY MEDICINE

## 2017-08-01 PROCEDURE — 93000 ELECTROCARDIOGRAM COMPLETE: CPT | Performed by: FAMILY MEDICINE

## 2017-08-01 PROCEDURE — 80053 COMPREHEN METABOLIC PANEL: CPT | Performed by: FAMILY MEDICINE

## 2017-08-01 PROCEDURE — 85025 COMPLETE CBC W/AUTO DIFF WBC: CPT | Performed by: FAMILY MEDICINE

## 2017-08-01 PROCEDURE — 36415 COLL VENOUS BLD VENIPUNCTURE: CPT | Performed by: FAMILY MEDICINE

## 2017-08-01 PROCEDURE — 86480 TB TEST CELL IMMUN MEASURE: CPT | Performed by: FAMILY MEDICINE

## 2017-08-01 PROCEDURE — 85652 RBC SED RATE AUTOMATED: CPT | Performed by: FAMILY MEDICINE

## 2017-08-01 PROCEDURE — 85610 PROTHROMBIN TIME: CPT | Mod: QW | Performed by: FAMILY MEDICINE

## 2017-08-01 RX ORDER — SULFAMETHOXAZOLE/TRIMETHOPRIM 800-160 MG
1 TABLET ORAL 2 TIMES DAILY
Qty: 14 TABLET | Refills: 0 | Status: SHIPPED | OUTPATIENT
Start: 2017-08-01 | End: 2017-08-08

## 2017-08-01 RX ORDER — MONTELUKAST SODIUM 10 MG/1
10 TABLET ORAL AT BEDTIME
Qty: 90 TABLET | Refills: 1 | Status: SHIPPED | OUTPATIENT
Start: 2017-08-01 | End: 2018-01-15

## 2017-08-01 NOTE — MR AVS SNAPSHOT
After Visit Summary   8/1/2017    Betty Tee    MRN: [id number]           Patient Information     Date Of Birth          1940        Visit Information        Provider Department      8/1/2017 10:30 AM Tennille Reid MD [facility]        Today's Diagnoses     Night sweats    -  1    Acute cystitis without hematuria        Atrial fibrillation with controlled ventricular response (H)        Type 2 diabetes mellitus with hyperglycemia, with long-term current use of insulin (H)        Essential hypertension with goal blood pressure less than 140/90        Left ventricular hypertrophy        Fatty liver disease, nonalcoholic        Gastroesophageal reflux disease without esophagitis        Sjogren's syndrome with other organ involvement (H)        Major depressive disorder, recurrent episode, moderate        Sjogren's syndrome with lung involvement (H)        ILD (interstitial lung disease) (H)        Chronic seasonal allergic rhinitis due to other allergen          Care Instructions    Urinary tract infection  Start antibiotic twice daily for 1 week    INR today and may have to be repeated in 1-2 weeks, nurse will call    Blood tests today for anemia, ESR. Complete metabolic panel , that checks for  Liver & kidney functions, also for TB test          Follow-ups after your visit        Your next 10 appointments already scheduled     Aug 30, 2017  9:30 AM CDT   Lab with  LAB    Health Lab ([facility])    [address] [place] [address]  1st [facility] [id number] [phone]            Aug 30, 2017 10:00 AM CDT   (Arrive by 9:45 AM)   CORE NEW with LIZY [facility] ([facility])    [address] [place] [address]  3rd [facility] [id number] [phone]            Oct 04, 2017 11:00 AM CDT   (Arrive by 10:45 AM)   Return Interstitial Lung with Meño Walsh MD   [facility] ([facility])    [address] [place] [address]  3rd [facility] [id number] [phone]            Oct 06, 2017  1:00 PM CDT   Office Visit with Ilene Tirstan MD   [facility] ([facility])    [address] [facility] [id number] [phone]           Bring a current list of meds and any records pertaining to this visit. For Physicals, please bring immunization records and any forms needing to be filled out. Please arrive 10 minutes early to complete paperwork.            Oct 26, 2017  2:30 PM CDT   Lab with  LAB   [facility] ([facility])    [address] [place] [id number] [phone]            Oct 26, 2017  3:00 PM CDT   (Arrive by 2:45 PM)   RETURN HEART FAILURE with Radha Rosa MD   [facility] ([facility])    [address] [place] [id number] [phone]              Who to contact     If you have questions or need follow up information about today's clinic visit or your schedule please contact [facility] directly at [phone].  Normal or non-critical lab and imaging results will be communicated to you by [employer], letter or phone within 4 business days after the clinic has received the results. If you do not hear from us within 7 days, please contact the clinic through MyChart or phone. If you have a critical or abnormal lab result, we will notify you by phone as soon as possible.  Submit refill requests through [employer] or call your pharmacy and they will forward the refill request to us. Please allow 3 business days for your refill to be completed.          Additional Information About Your Visit        [employer] Information     [employer] gives you secure access to your electronic health record. If you see a primary care provider, you can also send messages to your care team and make  "appointments. If you have questions, please call your primary care clinic.  If you do not have a primary care provider, please call [phone] and they will assist you.        Care EveryWhere ID     This is your Care EveryWhere ID. This could be used by other organizations to access your [place] medical records  [id number]        Your Vitals Were     Pulse Temperature Respirations Height Pulse Oximetry Breastfeeding?    78 98.4  F (36.9  C) (Oral) 16 5' 6\" (1.676 m) 96% No    BMI (Body Mass Index)                   33.09 kg/m2            Blood Pressure from Last 3 Encounters:   08/01/17 128/78   07/05/17 110/60   06/24/17 (!) 137/95    Weight from Last 3 Encounters:   08/01/17 205 lb (93 kg)   07/05/17 201 lb (91.2 kg)   06/23/17 201 lb (91.2 kg)              We Performed the Following     CBC with platelets differential     Comprehensive metabolic panel     EKG 12-lead complete w/read - Clinics     ESR: Erythrocyte sedimentation rate     INR point of care     M Tuberculosis by Quantiferon     UA with Microscopic     Urine Culture Aerobic Bacterial          Today's Medication Changes          These changes are accurate as of: 8/1/17 11:44 AM.  If you have any questions, ask your nurse or doctor.               Start taking these medicines.        Dose/Directions    sulfamethoxazole-trimethoprim 800-160 MG per tablet   Commonly known as:  BACTRIM DS/SEPTRA DS   Used for:  Acute cystitis without hematuria   Started by:  Tennille Reid MD        Dose:  1 tablet   Take 1 tablet by mouth 2 times daily for 7 days   Quantity:  14 tablet   Refills:  0         These medicines have changed or have updated prescriptions.        Dose/Directions    acyclovir 5 % ointment   Commonly known as:  ZOVIRAX   This may have changed:  additional instructions   Used for:  Recurrent cold sores        Apply topically 6 times daily   Quantity:  15 g   Refills:  3       fluticasone 50 MCG/ACT spray   Commonly known as:  FLONASE "   This may have changed:    - when to take this  - reasons to take this   Used for:  Seasonal allergic rhinitis        Dose:  1-2 spray   Spray 1-2 sprays into both nostrils daily   Quantity:  16 g   Refills:  5       PARoxetine 20 MG tablet   Commonly known as:  PAXIL   This may have changed:    - how much to take  - how to take this  - additional instructions   Used for:  Major depressive disorder, recurrent episode, moderate (H)        TAKE 1 TABLET (10mg) BY MOUTH AT BEDTIME   Quantity:  20 tablet   Refills:  3            Where to get your medicines      These medications were [facility] to [id number] [place] [facility] [address] [facility]  [address] [facility] [address]     Phone:  [phone]     montelukast 10 MG tablet    sulfamethoxazole-trimethoprim 800-160 MG per tablet                Primary Care Provider Office Phone # Fax #    Ilene Tristan [phone]       [facility] [address] [address]        Equal Access to Services     [facility] : Hadii jossue ku hadasho Soomaali, waaxda luqadaha, qaybta kaalmada adeegyada, waxay idiin haysanket mercedes . So [facility] [phone].    ATENCIÓN: Si habla español, tiene a conti disposición servicios gratuitos de asistencia lingüística. [facility] [phone].    We comply with applicable federal civil rights laws and [place] laws. We do not discriminate on the basis of race, color, national origin, age, disability sex, sexual orientation or gender identity.            Thank you!     Thank you for choosing [facility]  for your care. Our goal is always to provide you with excellent care. Hearing back from our patients is one way we can continue to improve our services. Please take a few minutes to complete the written survey that you may receive in the mail after your visit with us. Thank you!             Your Updated Medication List - Protect others around  you: Learn how to safely use, store and throw away your medicines at www.disposemymeds.org.          This list is accurate as of: 8/1/17 11:44 AM.  Always use your most recent med list.                   Brand Name Dispense Instructions for use Diagnosis    acyclovir 400 MG tablet    ZOVIRAX     Take 400 mg by mouth See Admin Instructions 5 times daily as needed for outbreaks        acyclovir 5 % ointment    ZOVIRAX    15 g    Apply topically 6 times daily    Recurrent cold sores       albuterol 108 (90 BASE) MCG/ACT Inhaler    PROAIR HFA/PROVENTIL HFA/VENTOLIN HFA    1 Inhaler    Inhale 2 puffs into the lungs every 6 hours    ILD (interstitial lung disease) (H)       atorvastatin 40 MG tablet    LIPITOR    90 tablet    Take 1 tablet (40 mg) by mouth daily    Type 2 diabetes mellitus with other specified complication (H)       * blood glucose monitoring lancets     4 Box    Use to test blood sugar 4 times daily or as directed.    Type 2 diabetes mellitus without complication, without long-term current use of insulin (H)       * blood glucose monitoring lancets     1 Box    Use to test blood sugar 4 times daily or as directed.  Ok to substitute alternative if insurance prefers.    Type 2 diabetes mellitus without complication, without long-term current use of insulin (H)       * blood glucose monitoring test strip    no brand specified    300 strip    Use to test blood sugars 4 times daily or as directed    Type 2 diabetes mellitus without complication, without long-term current use of insulin (H)       * blood glucose monitoring test strip    ACCU-CHEK SHANNON PLUS    120 strip    Use to test blood sugar 4 times daily or as directed.  Ok to substitute alternative if insurance prefers.    Type 2 diabetes mellitus without complication, without long-term current use of insulin (H)       COMPRESSION STOCKINGS     2 each    Wear compression stockings in affected leg (right leg) or both legs most of the time during the day  and take them off at night.    DVT (deep venous thrombosis), right, Postphlebitic syndrome, Chronic anticoagulation, Atrial fibrillation (H)       ferrous sulfate 325 (65 FE) MG tablet    IRON    30 tablet    Take 1 tablet (325 mg) by mouth daily (with breakfast)    Iron deficiency anemia due to chronic blood loss       fluticasone 220 MCG/ACT Inhaler    FLOVENT HFA    3 Inhaler    Inhale 2 puffs into the lungs 2 times daily    ILD (interstitial lung disease) (H), Follicular bronchiolitis (H)       fluticasone 50 MCG/ACT spray    FLONASE    16 g    Spray 1-2 sprays into both nostrils daily    Seasonal allergic rhinitis       furosemide 40 MG tablet    LASIX    30 tablet    40 mg in AM, 40 mg in afternoon,        insulin aspart 100 UNIT/ML injection    NovoLOG FLEXPEN    45 mL    Inject 12 Units Subcutaneous 3 times daily (with meals)    Type 2 diabetes mellitus with other specified complication (H)       * insulin pen needle 31G X 8 MM    B-D U/F    400 each    Use 4 times daily (with Lantus and Novolog) or as directed.    Type 2 diabetes mellitus without complication, without long-term current use of insulin (H)       * insulin pen needle 32G X 4 MM    BD JANE U/F    200 each    Use 4 daily as directed.    Type 2 diabetes mellitus without complication, without long-term current use of insulin (H)       ketoconazole 2 % cream    NIZORAL    60 g    Apply topically 2 times daily    Cutaneous candidiasis       lactobacillus rhamnosus (GG) capsule     90 capsule    Take 1 capsule by mouth 3 times daily (before meals)    Pneumonia due to infectious organism, unspecified laterality, unspecified part of lung       LANTUS SOLOSTAR 100 UNIT/ML injection   Generic drug:  insulin glargine     15 mL    INJECT 25 UNTIS SUBCUTANEOUSLY EVERY DAY    Type 2 diabetes mellitus with hyperglycemia, with long-term current use of insulin (H)       lisinopril 2.5 MG tablet    PRINIVIL/Zestril    90 tablet    Take 1 tablet (2.5 mg) by mouth  daily    Essential hypertension with goal blood pressure less than 140/90       metFORMIN 500 MG 24 hr tablet    GLUCOPHAGE-XR    180 tablet    Take 2 tablets (1,000 mg) by mouth daily (with dinner)    Type 2 diabetes mellitus without complication, without long-term current use of insulin (H)       metoprolol 100 MG 24 hr tablet    TOPROL-XL    90 tablet    Take 1 tablet (100 mg) by mouth daily    Atrial fibrillation with controlled ventricular response (H)       montelukast 10 MG tablet    SINGULAIR    90 tablet    Take 1 tablet (10 mg) by mouth At Bedtime    Sjogren's syndrome with lung involvement (H), ILD (interstitial lung disease) (H), Chronic seasonal allergic rhinitis due to other allergen       * order for DME     1 Device    Oxygen: Patient requires supplemental Oxygen 4 LPM via nasal canula with activity. Please provide patient with a home unit and with portability capability. Oxygen will be for a lifetime.    Hypoxia, ILD (interstitial lung disease) (H)       * order for DME     1 Device    Oxygen: Patient requires supplemental Oxygen 4 LPM via nasal canula with activity. Please provide patient with POC to improve mobility, okay to titrate for conserving to keep stats above 90%. Please fax results to [phone].  Oxygen will be for a lifetime.    Hypoxia, ILD (interstitial lung disease) (H)       * pantoprazole 20 MG EC tablet    PROTONIX    60 tablet    Take 1-2 tablets (20-40 mg) by mouth daily    Gastroesophageal reflux disease without esophagitis       * pantoprazole 40 MG EC tablet    PROTONIX    90 tablet    Take 1 tablet (40 mg) by mouth daily    Gastroesophageal reflux disease without esophagitis       PARoxetine 20 MG tablet    PAXIL    20 tablet    TAKE 1 TABLET (10mg) BY MOUTH AT BEDTIME    Major depressive disorder, recurrent episode, moderate (H)       polyethylene glycol Packet    MIRALAX/GLYCOLAX    7 packet    Take 17 g by mouth daily as needed for constipation    Constipation,  unspecified constipation type       potassium chloride SA 20 MEQ CR tablet    K-DUR/KLOR-CON M    180 tablet    Take 2 tablets (40 mEq) by mouth daily    Acute on chronic heart failure, unspecified heart failure type (H), Acute and chronic respiratory failure with hypoxia (H), (HFpEF) heart failure with preserved ejection fraction (H)       * predniSONE 10 MG tablet    DELTASONE    90 tablet    Take 2 tablets for three days (4/12-14), then take 1 tablet daily (10 mg daily starting 4/15)    ILD (interstitial lung disease) (H), Sjogren's syndrome (H)       * predniSONE 1 MG tablet    DELTASONE    30 tablet    Take 1 tablet (1 mg) by mouth daily    ILD (interstitial lung disease) (H)       spironolactone 25 MG tablet    ALDACTONE    180 tablet    Take 2 tablets (50 mg) by mouth daily    Chronic diastolic congestive heart failure (H)       sulfamethoxazole-trimethoprim 800-160 MG per tablet    BACTRIM DS/SEPTRA DS    14 tablet    Take 1 tablet by mouth 2 times daily for 7 days    Acute cystitis without hematuria       traZODone 50 MG tablet    DESYREL    90 tablet    TAKE 1/2-1 TABLET BY MOUTH NIGHTLY AS NEEDED, CAN TITRATE DOWN TO 1/2TAB OR UP TO 2TABS AS NEEDED    Insomnia due to medical condition       TYLENOL 500 MG tablet   Generic drug:  acetaminophen      Take 500 mg by mouth nightly as needed    Dizziness       warfarin 7.5 MG tablet    COUMADIN    100 tablet    Current dose is 7.5 mg daily.  Dose adjusted per INR result.    Atrial fibrillation with controlled ventricular response (H)       * Notice:  This list has 12 medication(s) that are the same as other medications prescribed for you. Read the directions carefully, and ask your doctor or other care provider to review them with you.

## 2017-08-01 NOTE — NURSING NOTE
"Chief Complaint   Patient presents with     Perspiration     Patient experiencing Night sweats- please refer to 7/29/17 TE (triaged)       Initial /78  Pulse 78  Temp 98.4  F (36.9  C) (Oral)  Resp 16  Ht 5' 6\" (1.676 m)  Wt 205 lb (93 kg)  SpO2 96%  Breastfeeding? No  BMI 33.09 kg/m2 Estimated body mass index is 33.09 kg/(m^2) as calculated from the following:    Height as of this encounter: 5' 6\" (1.676 m).    Weight as of this encounter: 205 lb (93 kg).  Medication Reconciliation: complete    Health Maintenance Due Pending Provider Review:  NONE    n/a    Jacki Ulloa MA  [facility]  "

## 2017-08-01 NOTE — PROGRESS NOTES
SUBJECTIVE:                                                    Betty Tee is a 77 year old female who presents to clinic today for the following health issues:    Chief Complaint   Patient presents with     Perspiration     Patient experiencing Night sweats- please refer to 7/29/17 TE (triaged)     She reports last week she was having night sweats a lot,soaking sheets and pillows were wet- and had some during the day as well- and now none for past 2 night. She reports she is drinking a lot of water and not able to eliminate as much urine, and comes only a little bit out- she wants to give a urine sample- even that came out small amount, not associated with blood or burining in urine. She states she feels she is retaining fluid  She has history of HFpEF, she states she takes awful  Lot of medications- currently on diuretic lasix 40 mg twice daily. spirolactone 25 mg once daily , lisinopril 2.5 mg once daily,   The patient denies abnormal bruising or abnormal bleeding from any body orifice such as bleeding from nose or gums, blood in urine or stool, or melena, hemoptysis or hemetemesis.she has a history of a.fib for years, and states that she takes coumadin for years- and current on coumadin 7.5 mg once daily and reports she is due to get it checked .  She reports no travel, no coughing, no blood in any orifice. No known exposure to TB     History of Diabetes    She reports no low blood sugar, and she reports no fever, she reports good appetite.  According to patient  She has been tracking diet and medications well- and Diabetes  Is controlled with- Lantus- 25 units daily, metformin 2000mg/d, and novolog with meals 12 units with meals      She states before seeing Ilene Tristan , she ended up in emergency department and was diagnosed and given oral medications for acute urinary tract infection - while she had no symptoms   . I have reviewed history and 6/23/17 she was seen for that    She has a  "history of Sjogren's syndrome with ILD prednisone for years, Under care of- Rheumatology & also see pulmonology for the ILD       has been out of montelukast and would like it refilled as it helps with sneezing and has been having some ear pain in right ear  PROBLEMS TO ADD ON...    Problem list and histories reviewed & adjusted, as indicated.  Additional history: as documented    Labs reviewed in EPIC    Reviewed and updated as needed this visit by clinical staff  Tobacco  Allergies  Meds  Problems  Med Hx  Surg Hx  Fam Hx       Reviewed and updated as needed this visit by Provider         ROS:  CONSTITUTIONAL:night sweats  I: NEGATIVE for worrisome rashes, moles or lesions  E: NEGATIVE for vision changes or irritation  ENT/MOUTH: has been out of montelukast and would like it refilled as it helps   R: NEGATIVE for significant cough or SOB  B: NEGATIVE for masses, tenderness or discharge  CV: NEGATIVE for chest pain, palpitations or peripheral edema  GI: NEGATIVE for nausea, abdominal pain, heartburn, or change in bowel habits  : NEGATIVE for frequency, dysuria, or hematuria  N: NEGATIVE for weakness, dizziness or paresthesias  E: NEGATIVE for temperature intolerance, skin/hair changes  H: NEGATIVE for bleeding problems  P: NEGATIVE for changes in mood or affect    OBJECTIVE:     /78  Pulse 78  Temp 98.4  F (36.9  C) (Oral)  Resp 16  Ht 5' 6\" (1.676 m)  Wt 205 lb (93 kg)  SpO2 96%  Breastfeeding? No  BMI 33.09 kg/m2  Body mass index is 33.09 kg/(m^2).  GENERAL: healthy, alert and no distress  HENT: ear canals and TM's normal, nose and mouth without ulcers or lesions  NECK: no adenopathy, no asymmetry, masses, or scars and thyroid normal to palpation  RESP: lungs clear to auscultation - no rales, rhonchi or wheezes  CV: irregularly irregular rhythm, peripheral pulses strong and 1+ bilateral lower extremity pitting edema t    ABDOMEN: soft, nontender, no hepatosplenomegaly, no masses and bowel " sounds normal  MS: no gross musculoskeletal defects noted, no edema  PSYCH: mentation appears normal, affect normal/bright    Diagnostic Test Results:  Results for orders placed or performed in visit on 08/01/17 (from the past 24 hour(s))   UA with Microscopic   Result Value Ref Range    Color Urine Yellow     Appearance Urine Clear     Glucose Urine Negative NEG mg/dL    Bilirubin Urine Negative NEG    Ketones Urine Negative NEG mg/dL    Specific Gravity Urine 1.010 1.003 - 1.035    pH Urine 6.0 5.0 - 7.0 pH    Protein Albumin Urine Negative NEG mg/dL    Urobilinogen Urine 0.2 0.2 - 1.0 EU/dL    Nitrite Urine Negative NEG    Blood Urine Trace (A) NEG    Leukocyte Esterase Urine Small (A) NEG    Source Midstream Urine     WBC Urine 5-10 (A) 0 - 2 /HPF    RBC Urine 2-5 (A) 0 - 2 /HPF    Hyaline Casts O - 2 0 - 2 /LPF    Squamous Epithelial /LPF Urine Few FEW /LPF    Bacteria Urine Few (A) NEG /HPF     *Note: Due to a large number of results and/or encounters for the requested time period, some results have not been displayed. A complete set of results can be found in Results Review.       ASSESSMENT/PLAN:                   Tennille Reid MD  [facility]

## 2017-08-01 NOTE — PATIENT INSTRUCTIONS
Urinary tract infection  Start antibiotic twice daily for 1 week    INR today and may have to be repeated in 1-2 weeks, nurse will call    Blood tests today for anemia, ESR. Complete metabolic panel , that checks for  Liver & kidney functions, also for TB test

## 2017-08-01 NOTE — TELEPHONE ENCOUNTER
ANTICOAGULATION FOLLOW-UP CLINIC VISIT    Patient Name:  Betty Tee  Date:  8/1/2017  Contact Type:  Telephone. Dose instructions left on patient's voice mail    SUBJECTIVE:  Bleeding Signs/Symptoms: None  Thromboembolic Signs/Symptoms: None    Medication Changes:  No  Dietary Changes:  No  Bacterial/Viral Infection:  No    Missed Coumadin Doses:  None  Other Concerns:  No      ASSESSMENT/PLAN:  See: ANTICOAGULATION QIC flow sheet.    INR 2.0  Plan: Continue 7.5 mg daily = 52.5 mg/wk. Recheck INR in 4 weeks.  Sophia Hemphill RN      Dosage adjustment made based on physician directed care plan.    JIMI VOGT

## 2017-08-02 LAB
ALBUMIN SERPL-MCNC: 3.2 G/DL (ref 3.4–5)
ALP SERPL-CCNC: 139 U/L (ref 40–150)
ALT SERPL W P-5'-P-CCNC: 45 U/L (ref 0–50)
ANION GAP SERPL CALCULATED.3IONS-SCNC: 4 MMOL/L (ref 3–14)
AST SERPL W P-5'-P-CCNC: 40 U/L (ref 0–45)
BACTERIA SPEC CULT: NORMAL
BILIRUB SERPL-MCNC: 0.5 MG/DL (ref 0.2–1.3)
BUN SERPL-MCNC: 21 MG/DL (ref 7–30)
CALCIUM SERPL-MCNC: 9 MG/DL (ref 8.5–10.1)
CHLORIDE SERPL-SCNC: 104 MMOL/L (ref 94–109)
CO2 SERPL-SCNC: 31 MMOL/L (ref 20–32)
CREAT SERPL-MCNC: 0.9 MG/DL (ref 0.52–1.04)
GFR SERPL CREATININE-BSD FRML MDRD: 61 ML/MIN/1.7M2
GLUCOSE SERPL-MCNC: 98 MG/DL (ref 70–99)
MICRO REPORT STATUS: NORMAL
POTASSIUM SERPL-SCNC: 3.5 MMOL/L (ref 3.4–5.3)
PROT SERPL-MCNC: 7.5 G/DL (ref 6.8–8.8)
SODIUM SERPL-SCNC: 139 MMOL/L (ref 133–144)
SPECIMEN SOURCE: NORMAL

## 2017-08-03 LAB
M TB TUBERC IFN-G BLD QL: NEGATIVE
M TB TUBERC IFN-G/MITOGEN IGNF BLD: 0 IU/ML

## 2017-08-03 NOTE — PROGRESS NOTES
SUBJECTIVE:                                                    Betty Tee is a 77 year old female who presents to clinic today for the following health issues:    Chief Complaint   Patient presents with     Perspiration     Patient experiencing Night sweats- please refer to 7/29/17 TE (triaged)     She reports last week she was having night sweats a lot,soaking sheets and pillows were wet- and had some during the day as well- and now none for past 2 night. She reports she is drinking a lot of water and not able to eliminate as much urine, and comes only a little bit out- she gave us  a urine sample- even that came out small amount, not associated with blood or burining in urine. She states she feels she is retaining fluid.    She reports she has not noticed shortness of breath , or low blood sugar associated with sweating. She reports there is no associated chest pain with sweating either     She has history of HFpEF, she states she takes awful  Lot of medications- currently on diuretic lasix 40 mg twice daily. spirolactone 25 mg once daily , lisinopril 2.5 mg once daily,     The patient denies abnormal bruising or abnormal bleeding from any body orifice such as bleeding from nose or gums, blood in urine or stool, or melena, hemoptysis or hemetemesis.she has a history of a.fib for years, and states that she takes coumadin for years- and current on coumadin 7.5 mg once daily and reports she is due to get it checked .  She reports no travel, no coughing, no blood in any orifice. No known exposure to TB     History of Diabetes    She reports no low blood sugar,     and she reports no fever, she reports good appetite.  According to patient  She has been tracking diet and medications well- and Diabetes  Is controlled with- Lantus- 25 units daily, metformin 2000mg/d, and novolog with meals 12 units with meals      She states before seeing Ilene Tristan , she ended up in emergency department and was  "diagnosed and given oral medications for acute urinary tract infection - while she had no symptoms    I have reviewed history and 6/23/17 she was seen for that    She has a history of Sjogren's syndrome with ILD prednisone for years, Under care of- Rheumatology & also see pulmonology for the ILD      She states she  has been out of montelukast and would like it refilled as it helps with sneezing and has been having some ear pain in right ear    PROBLEMS TO ADD ON...    Problem list and histories reviewed & adjusted, as indicated.  Additional history: as documented    Labs reviewed in EPIC    Reviewed and updated as needed this visit by clinical staff  Tobacco  Allergies  Meds  Problems  Med Hx  Surg Hx  Fam Hx       Reviewed and updated as needed this visit by Provider         ROS:  CONSTITUTIONAL:night sweats  I: NEGATIVE for worrisome rashes, moles or lesions  E: NEGATIVE for vision changes or irritation  ENT/MOUTH: has been out of montelukast and would like it refilled as it helps   R: NEGATIVE for significant cough or SOB  B: NEGATIVE for masses, tenderness or discharge  CV: NEGATIVE for chest pain, palpitations or peripheral edema  GI: NEGATIVE for nausea, abdominal pain, heartburn, or change in bowel habits  : NEGATIVE for frequency, dysuria, or hematuria  N: NEGATIVE for weakness, dizziness or paresthesias  E: NEGATIVE for temperature intolerance, skin/hair changes  H: NEGATIVE for bleeding problems  P: NEGATIVE for changes in mood or affect    OBJECTIVE:     /78  Pulse 78  Temp 98.4  F (36.9  C) (Oral)  Resp 16  Ht 5' 6\" (1.676 m)  Wt 205 lb (93 kg)  SpO2 96%  Breastfeeding? No  BMI 33.09 kg/m2  Body mass index is 33.09 kg/(m^2).  GENERAL: healthy, alert and no distress  HENT: ear canals and TM's normal, nose and mouth without ulcers or lesions  NECK: no adenopathy, no asymmetry, masses, or scars and thyroid normal to palpation  RESP: lungs clear to auscultation - no rales, rhonchi or " wheezes  CV: irregularly irregular rhythm, peripheral pulses strong and 1+ bilateral lower extremity pitting edema t    ABDOMEN: soft, nontender, no hepatosplenomegaly, no masses and bowel sounds normal  MS: no gross musculoskeletal defects noted, no edema  PSYCH: mentation appears normal, affect normal/bright    Diagnostic Test Results:  Results for orders placed or performed in visit on 08/01/17 (from the past 24 hour(s))   UA with Microscopic   Result Value Ref Range    Color Urine Yellow     Appearance Urine Clear     Glucose Urine Negative NEG mg/dL    Bilirubin Urine Negative NEG    Ketones Urine Negative NEG mg/dL    Specific Gravity Urine 1.010 1.003 - 1.035    pH Urine 6.0 5.0 - 7.0 pH    Protein Albumin Urine Negative NEG mg/dL    Urobilinogen Urine 0.2 0.2 - 1.0 EU/dL    Nitrite Urine Negative NEG    Blood Urine Trace (A) NEG    Leukocyte Esterase Urine Small (A) NEG    Source Midstream Urine     WBC Urine 5-10 (A) 0 - 2 /HPF    RBC Urine 2-5 (A) 0 - 2 /HPF    Hyaline Casts O - 2 0 - 2 /LPF    Squamous Epithelial /LPF Urine Few FEW /LPF    Bacteria Urine Few (A) NEG /HPF     *Note: Due to a large number of results and/or encounters for the requested time period, some results have not been displayed. A complete set of results can be found in Results Review.       ASSESSMENT/PLAN:   (R61) Night sweats  (primary encounter diagnosis)  Plan: we discussed the possibilities of night sweats from  Anemia, infection  She reports it has resolved for past 2 days and that's good.  We discussed the possibility of low blood sugar from  Diabetes  Medications- and she reports she has never noted any-with excess sweating and is well aware of the symptoms of hypoglycemia.  CBC with platelets differential, No anemia and that's good  Blood marker for chronic inflammation is within normal limit ESR:   M Tuberculosis  by Quantiferon, Comprehensive metabolic panel, -pedning   EKG  Discussed with patient and no acute changes- she  has a.fib with controlled rate  UA  Shows evidence of urinary tract infection   She is advised to follow up as needed  If recurring night sweats. We did not check for thyroid blood test but that can be entertained if concerns persists    (N30.00) Acute cystitis without hematuria  Plan:  We discussed the need to treat urinary tract infection & Urine Culture  Is sent as well  She is to start       sulfamethoxazole-trimethoprim (BACTRIM DS/SEPTRA DS) 800-160 MG per tablet twice daily for 7 days. Potential medication side effects were discussed with the patient; let me know if any occur.      (J30.2) Chronic seasonal allergic rhinitis due to other allergen  Plan: montelukast (SINGULAIR) 10 MG tablet once daily - refilled today      (I48.91) Atrial fibrillation with controlled ventricular response (H)  Plan: she is on chronic anticoagulation- and due for INR  She takes coumadin 7.5 mg once daily   Rate controlled on metoprolol 100 mg once daily -    (E11.65,  Z79.4) Type 2 diabetes mellitus with hyperglycemia, with long-term current use of insulin (H)  Plan: well controlled- on Lantus- 25 units daily, metformin 2000mg/d, and novolog with meals 12 units with meals  She also takes statins/atrovastatin 40 mg once daily /lisinopril 2.5 mf once daily   Lab Results   Component Value Date    A1C 6.4 07/05/2017    A1C 14.3 04/04/2017    A1C 9.4 01/24/2017    A1C 7.0 09/02/2016    A1C 5.6 11/04/2011         (I10) Essential hypertension with goal blood pressure less than 140/90  Plan: controlled-on ace and Beta blocker- no change necessary    (I51.7) Left ventricular hypertrophy  Plan: history of HFpEF, -stable - currently on diuretic lasix 40 mg twice daily. spirolactone 25 mg once daily , lisinopril 2.5 mg once daily,         (M35.02) Sjogren's syndrome with lung involvement (H)  Plan:(J84.9) ILD (interstitial lung disease) (H)  Plan: under care of rheumatologist and pulmonologist- advised to continue that.    montelukast  (SINGULAIR) 10 MG tablet once daily helps with post nasal dripping and prevents cough- its refilled today    History of depression but no acute concerns  Currently stable on Paxil 20 mg once daily and trazodone 50 mg bedtime         20 out of 25 mins spend in above counsleing    The patient indicates understanding of these issues and agrees with the plan.              Tennille Reid MD  [facility]

## 2017-08-03 NOTE — PROGRESS NOTES
-Liver and gallbladder tests are normal. (ALT,AST, Alk phos, bilirubin), kidney function is normal (Cr, GFR), Sodium is normal, Potassium is normal, Calcium is normal, Glucose is normal (diabetes screening test).     ESR- blood marker for chronic inflammation is within normal limits and that's good  The hemoglobin is lowe normal- that's better than a month ago  White blood count is slightly high, but it was higher 2 months ago-so probably not clinically relevant-unless you continue to have unusual symptoms of fatigue or night sweat to continue- in that case, I do recommend follow up in the clinic- and the complete blood count will be repeated.      Please complete the antibiotic given for urinary tract infection and keep us posted with unresolved symptoms of concern    Thank you  Tennille Reid ....................  8/2/2017   10:07 PM

## 2017-08-06 DIAGNOSIS — F33.1 MAJOR DEPRESSIVE DISORDER, RECURRENT EPISODE, MODERATE (H): ICD-10-CM

## 2017-08-06 DIAGNOSIS — E11.69 TYPE 2 DIABETES MELLITUS WITH OTHER SPECIFIED COMPLICATION (H): ICD-10-CM

## 2017-08-06 DIAGNOSIS — E78.5 HYPERLIPIDEMIA LDL GOAL <100: ICD-10-CM

## 2017-08-07 NOTE — TELEPHONE ENCOUNTER
CW,  Pharmacy requesting new Rx for Paxil 10mg daily  Last Rx 5/31/2017 for 20mg tab, take 10mg daily (1/2 tab)  AS noted in 8/1/2017 OV pt taking 20mg daily    Called pt to confirm dosing  She is taking 10mg daily, but 20mg pill too hard to cut in half  Requesting 10mg tab    Order pended.  Please authorize if appropriate.  Thanks,  Lydia HALEY RN

## 2017-08-07 NOTE — TELEPHONE ENCOUNTER
Pending Prescriptions:                       Disp   Refills    PARoxetine (PAXIL) 10 MG tablet [Pharmacy*30 tab*1            Sig: TAKE 1 TABLET (10MG) BY MOUTH AT BEDTIME        Last Written Prescription Date: 5/31/2017  Last Fill Quantity: 20, # refills: 3  Last Office Visit with [facility] primary care provider:  8/1/2017   Next 5 appointments (look out 90 days)     Oct 06, 2017  1:00 PM CDT   Office Visit with Ilene Tristan MD   [facility] ([facility])    [address]  [facility] [id number] [phone]                   Last PHQ-9 score on record=   PHQ-9 SCORE 7/5/2017   Total Score -   Total Score MyChart -   Total Score 5

## 2017-08-07 NOTE — TELEPHONE ENCOUNTER
"The last time I could see we addressed at an office visit was in 5/31/17- notes below...  \"MDD- irritability with the lower paxil dose of 10mg/d, went back up to 20mg on her own 2 wks ago.  Too soon to tell if helpful- still crabby.\"  Rx that was sent in around the time of that visit did specify 10mg/d, though, so she may be taking 10mg even though we discussed having her take 20mg/d.    Also had wanted to talk to her about her low LDL (39 in 6/17), and backing off on her statin dose- to ~20mg/d.    Tried to call pt to discuss, but unable to reach her- will try again this week.  CW  "

## 2017-08-09 RX ORDER — ATORVASTATIN CALCIUM 20 MG/1
20 TABLET, FILM COATED ORAL DAILY
Qty: 90 TABLET | Refills: 0 | Status: SHIPPED | OUTPATIENT
Start: 2017-08-09 | End: 2017-10-06

## 2017-08-09 RX ORDER — PAROXETINE 10 MG/1
TABLET, FILM COATED ORAL
Qty: 30 TABLET | Refills: 1 | Status: SHIPPED | OUTPATIENT
Start: 2017-08-09 | End: 2017-10-06 | Stop reason: ALTCHOICE

## 2017-08-09 NOTE — TELEPHONE ENCOUNTER
Med clarification-    Lipitor - will go down to 20mg/d based on low LDL of 39 in 6/17.  Sent in new rx, but to fill at her need- in meantime, she has a lot, and prefers to do the 1/2 tab of the 40mg tabs.    Paxil-  Pt looked at her meds- looks like she had 1/2 tabs of 10mg tabs and 20mg tabs, so may actually sometimes only be taking 5mg.  Doesn't think she's too irritable, though. Will send in new rx for 10mg, and f/u at her 10/6/17 appt, and go back up to 20mg if not doing as well.    Other issues.  Night sweats- seen here, done with UTI txt.  They are less, still there, but less.  Some prior were really bad, wringing out clothes/pillows.  Now more her head is sweaty.  TB was ruled out.    Will cont to monitor sx's.  F/u at 10/6/17 visit for f/u on all issues, sooner if concerns.  CW

## 2017-08-11 ENCOUNTER — TELEPHONE (OUTPATIENT)
Dept: FAMILY MEDICINE | Facility: CLINIC | Age: 77
End: 2017-08-11

## 2017-08-11 NOTE — TELEPHONE ENCOUNTER
Ken Bennett,  Please advise is CW's absence.    Pt is on apart, supposed to take 12 units tid with meals.  Pt says she felt shaky and took BG it was 89 before lunch she says she did not take insulin and wanting to know if she should have taken it or when to hold it.  There are no parameters on it.  She also takes Lantus in the am.    Please advise.  Thanks,  Radha Otero RN

## 2017-08-11 NOTE — TELEPHONE ENCOUNTER
I think it would be fine to have her hold The insulin at mealtime if she is <90 prior to eating. If she is having frequent lows - she should follow-up with CW or diabetes education (she is not an active MTM patient per her choice).     Sabrina Meek, PharmD, JAMES, BCACP  MTM Pharmacist, [facility]

## 2017-08-11 NOTE — TELEPHONE ENCOUNTER
Reason for call:  Patient reporting a symptom    Symptom or request: low blood sugar    Duration (how long have symptoms been present): was 134 this morning  And 89 at noon time    Have you been treated for this before? Yes      Phone Number patient can be reached at:  Home number on file [phone] (home)    Best Time:  anytime    Can we leave a detailed message on this number:  YES    Call taken on 8/11/2017 at 1:17 PM by Ariella Flowers

## 2017-08-13 DIAGNOSIS — E11.69 TYPE 2 DIABETES MELLITUS WITH OTHER SPECIFIED COMPLICATION (H): ICD-10-CM

## 2017-08-13 NOTE — TELEPHONE ENCOUNTER
Novolog Flex Pen          Last Written Prescription Date: 05/17/17  Last Fill Quantity: 45ml, # refills: 0  Last Office Visit with G, P or  Health prescribing provider:  08/01/2017   Next 5 appointments (look out 90 days)     Oct 06, 2017  1:00 PM CDT   Office Visit with Ilene Tristan MD   [facility]    [address] [facility] [address] [phone]                   BP Readings from Last 3 Encounters:   08/01/17 128/78   07/05/17 110/60   06/24/17 (!) 137/95     Lab Results   Component Value Date    MICROL 10 09/20/2016     Lab Results   Component Value Date    UMALCR 21.13 09/20/2016     Creatinine   Date Value Ref Range Status   08/01/2017 0.90 0.52 - 1.04 mg/dL Final   ]  GFR Estimate   Date Value Ref Range Status   08/01/2017 61 >60 mL/min/1.7m2 Final     Comment:     Non  GFR Calc   07/05/2017 68 >60 mL/min/1.7m2 Final     Comment:     Non  GFR Calc   06/29/2017 66 >60 mL/min/1.7m2 Final     Comment:     Non  GFR Calc     GFR Estimate If Black   Date Value Ref Range Status   08/01/2017 74 >60 mL/min/1.7m2 Final     Comment:      GFR Calc   07/05/2017 82 >60 mL/min/1.7m2 Final     Comment:      GFR Calc   06/29/2017 80 >60 mL/min/1.7m2 Final     Comment:      GFR Calc     Lab Results   Component Value Date    CHOL 101 06/22/2017     Lab Results   Component Value Date    HDL 62 06/22/2017     Lab Results   Component Value Date    LDL 13 06/22/2017     Lab Results   Component Value Date    TRIG 132 06/22/2017     Lab Results   Component Value Date    CHOLHDLRATIO 2.5 08/06/2014     Lab Results   Component Value Date    AST 40 08/01/2017     Lab Results   Component Value Date    ALT 45 08/01/2017     Lab Results   Component Value Date    A1C 6.4 07/05/2017    A1C 14.3 04/04/2017    A1C 9.4 01/24/2017    A1C 7.0 09/02/2016    A1C 5.6 11/04/2011      Potassium   Date Value Ref Range Status   08/01/2017 3.5 3.4 - 5.3 mmol/L Final

## 2017-08-14 RX ORDER — INSULIN ASPART 100 [IU]/ML
INJECTION, SOLUTION INTRAVENOUS; SUBCUTANEOUS
Qty: 15 ML | Refills: 1 | Status: SHIPPED | OUTPATIENT
Start: 2017-08-14 | End: 2017-10-30

## 2017-08-17 ENCOUNTER — PRE VISIT (OUTPATIENT)
Dept: CARDIOLOGY | Facility: CLINIC | Age: 77
End: 2017-08-17

## 2017-08-17 DIAGNOSIS — I50.32 CHRONIC DIASTOLIC HEART FAILURE (H): Primary | ICD-10-CM

## 2017-08-24 ENCOUNTER — TRANSFERRED RECORDS (OUTPATIENT)
Dept: HEALTH INFORMATION MANAGEMENT | Facility: CLINIC | Age: 77
End: 2017-08-24

## 2017-08-25 DIAGNOSIS — I50.30 (HFPEF) HEART FAILURE WITH PRESERVED EJECTION FRACTION (H): Primary | ICD-10-CM

## 2017-08-25 RX ORDER — FUROSEMIDE 40 MG
40 TABLET ORAL 2 TIMES DAILY
Qty: 180 TABLET | Refills: 3 | Status: SHIPPED | OUTPATIENT
Start: 2017-08-25 | End: 2018-02-01

## 2017-08-25 NOTE — TELEPHONE ENCOUNTER
Attempted to return call to patient at requested cell phone number but there was no answer and no recorded message.  Patients lasix bottle had instructions for her to take 40 am/20 pm. However, she had been taking 40 bid which is apparently what she felt was effective in keeping fluid off. See Dr. Rosa's last note.  I refilled script for 40 bid this afternoon as she reported to triage nurse she would be out on Sunday. She had labs earlier this month which showed normal renal function.  She has a CORE appt with labs next Wednesday.

## 2017-08-30 ENCOUNTER — OFFICE VISIT (OUTPATIENT)
Dept: CARDIOLOGY | Facility: CLINIC | Age: 77
End: 2017-08-30
Attending: NURSE PRACTITIONER
Payer: MEDICARE

## 2017-08-30 ENCOUNTER — TELEPHONE (OUTPATIENT)
Dept: FAMILY MEDICINE | Facility: CLINIC | Age: 77
End: 2017-08-30

## 2017-08-30 VITALS
DIASTOLIC BLOOD PRESSURE: 71 MMHG | WEIGHT: 203 LBS | HEIGHT: 66 IN | OXYGEN SATURATION: 96 % | SYSTOLIC BLOOD PRESSURE: 102 MMHG | BODY MASS INDEX: 32.62 KG/M2 | HEART RATE: 78 BPM

## 2017-08-30 DIAGNOSIS — I48.91 ATRIAL FIBRILLATION WITH CONTROLLED VENTRICULAR RESPONSE (H): ICD-10-CM

## 2017-08-30 DIAGNOSIS — I82.409 DVT (DEEP VENOUS THROMBOSIS) (H): Primary | ICD-10-CM

## 2017-08-30 DIAGNOSIS — I50.32 CHRONIC DIASTOLIC HEART FAILURE (H): ICD-10-CM

## 2017-08-30 DIAGNOSIS — N39.0 URINARY TRACT INFECTION, SITE UNSPECIFIED: ICD-10-CM

## 2017-08-30 DIAGNOSIS — R61 NIGHT SWEATS: ICD-10-CM

## 2017-08-30 DIAGNOSIS — I82.409 DVT (DEEP VENOUS THROMBOSIS) (H): ICD-10-CM

## 2017-08-30 DIAGNOSIS — R61 NIGHT SWEATS: Primary | ICD-10-CM

## 2017-08-30 LAB
ALBUMIN UR-MCNC: NEGATIVE MG/DL
ANION GAP SERPL CALCULATED.3IONS-SCNC: 12 MMOL/L (ref 3–14)
APPEARANCE UR: CLEAR
BASOPHILS # BLD AUTO: 0.1 10E9/L (ref 0–0.2)
BASOPHILS NFR BLD AUTO: 0.6 %
BILIRUB UR QL STRIP: NEGATIVE
BUN SERPL-MCNC: 22 MG/DL (ref 7–30)
CALCIUM SERPL-MCNC: 8.8 MG/DL (ref 8.5–10.1)
CHLORIDE SERPL-SCNC: 101 MMOL/L (ref 94–109)
CO2 SERPL-SCNC: 24 MMOL/L (ref 20–32)
COLOR UR AUTO: YELLOW
CREAT SERPL-MCNC: 0.88 MG/DL (ref 0.52–1.04)
DIFFERENTIAL METHOD BLD: ABNORMAL
EOSINOPHIL # BLD AUTO: 0.4 10E9/L (ref 0–0.7)
EOSINOPHIL NFR BLD AUTO: 2.8 %
ERYTHROCYTE [DISTWIDTH] IN BLOOD BY AUTOMATED COUNT: 19.8 % (ref 10–15)
GFR SERPL CREATININE-BSD FRML MDRD: 62 ML/MIN/1.7M2
GLUCOSE SERPL-MCNC: 159 MG/DL (ref 70–99)
GLUCOSE UR STRIP-MCNC: NEGATIVE MG/DL
HCT VFR BLD AUTO: 41.7 % (ref 35–47)
HGB BLD-MCNC: 13.2 G/DL (ref 11.7–15.7)
HGB UR QL STRIP: ABNORMAL
IMM GRANULOCYTES # BLD: 0.1 10E9/L (ref 0–0.4)
IMM GRANULOCYTES NFR BLD: 0.7 %
INR PPP: 2.09 (ref 0.86–1.14)
KETONES UR STRIP-MCNC: NEGATIVE MG/DL
LEUKOCYTE ESTERASE UR QL STRIP: ABNORMAL
LYMPHOCYTES # BLD AUTO: 1.5 10E9/L (ref 0.8–5.3)
LYMPHOCYTES NFR BLD AUTO: 10.6 %
MCH RBC QN AUTO: 27.3 PG (ref 26.5–33)
MCHC RBC AUTO-ENTMCNC: 31.7 G/DL (ref 31.5–36.5)
MCV RBC AUTO: 86 FL (ref 78–100)
MONOCYTES # BLD AUTO: 1.4 10E9/L (ref 0–1.3)
MONOCYTES NFR BLD AUTO: 10.3 %
MUCOUS THREADS #/AREA URNS LPF: PRESENT /LPF
NEUTROPHILS # BLD AUTO: 10.3 10E9/L (ref 1.6–8.3)
NEUTROPHILS NFR BLD AUTO: 75 %
NITRATE UR QL: NEGATIVE
NRBC # BLD AUTO: 0 10*3/UL
NRBC BLD AUTO-RTO: 0 /100
NT-PROBNP SERPL-MCNC: 866 PG/ML (ref 0–450)
PH UR STRIP: 6 PH (ref 5–7)
PLATELET # BLD AUTO: 263 10E9/L (ref 150–450)
POTASSIUM SERPL-SCNC: 3.8 MMOL/L (ref 3.4–5.3)
RBC # BLD AUTO: 4.84 10E12/L (ref 3.8–5.2)
RBC #/AREA URNS AUTO: 18 /HPF (ref 0–2)
SODIUM SERPL-SCNC: 137 MMOL/L (ref 133–144)
SOURCE: ABNORMAL
SP GR UR STRIP: 1.01 (ref 1–1.03)
SQUAMOUS #/AREA URNS AUTO: <1 /HPF (ref 0–1)
UROBILINOGEN UR STRIP-MCNC: 0 MG/DL (ref 0–2)
WBC # BLD AUTO: 13.7 10E9/L (ref 4–11)
WBC #/AREA URNS AUTO: 3 /HPF (ref 0–2)

## 2017-08-30 PROCEDURE — 87086 URINE CULTURE/COLONY COUNT: CPT | Performed by: NURSE PRACTITIONER

## 2017-08-30 PROCEDURE — 81001 URINALYSIS AUTO W/SCOPE: CPT | Performed by: NURSE PRACTITIONER

## 2017-08-30 PROCEDURE — 80048 BASIC METABOLIC PNL TOTAL CA: CPT | Performed by: NURSE PRACTITIONER

## 2017-08-30 PROCEDURE — 87040 BLOOD CULTURE FOR BACTERIA: CPT | Performed by: NURSE PRACTITIONER

## 2017-08-30 PROCEDURE — 99214 OFFICE O/P EST MOD 30 MIN: CPT | Mod: ZP | Performed by: NURSE PRACTITIONER

## 2017-08-30 PROCEDURE — 85025 COMPLETE CBC W/AUTO DIFF WBC: CPT | Performed by: FAMILY MEDICINE

## 2017-08-30 PROCEDURE — 36415 COLL VENOUS BLD VENIPUNCTURE: CPT | Performed by: NURSE PRACTITIONER

## 2017-08-30 PROCEDURE — 83880 ASSAY OF NATRIURETIC PEPTIDE: CPT | Performed by: NURSE PRACTITIONER

## 2017-08-30 PROCEDURE — 85610 PROTHROMBIN TIME: CPT | Performed by: FAMILY MEDICINE

## 2017-08-30 PROCEDURE — 99213 OFFICE O/P EST LOW 20 MIN: CPT | Mod: ZF

## 2017-08-30 ASSESSMENT — PAIN SCALES - GENERAL: PAINLEVEL: NO PAIN (0)

## 2017-08-30 NOTE — TELEPHONE ENCOUNTER
UP Lab received some INRP orders without visit numbers on them. We are assuming that these orders should have been standing order?  Please cancel present orders and re-order the lab orders as needed.    These lab orders were placed by Guera Emery.    Thanks, Lab

## 2017-08-30 NOTE — PATIENT INSTRUCTIONS
"You were seen today in the Cardiovascular Clinic at the [facility].     Cardiology Providers you saw during your visit: Kiesha Elias NP     1. We will call your oxygen company to find out what is needed for  oxygen tank retrieval.    2.  Please return downstairs for labs for additional blood work.    3. Please keep your follow-up appointment with Dr. Rosa.    Please limit your fluid intake to 2 L (64 ounces) daily.  2 Liters a day = 8.5 cups, or 72 ounces.  Please limit your salt intake to 2 grams a day or less.    If you gain 2# in 24 hours or 5# in one week call Christianne Bee RN so we can adjust your medications as needed over the phone.    Please feel free to call me with any questions or concerns.      Christianne Bee RN BSN [facility]  Cardiology Care Coordinator-Heart Failure Clinic    Questions and [phone].   First press #1 for the University and then press #3 for \"Medical Questions\" to reach the Cardiology Nurses.     On Call Cardiologist for after hours or on weekends: [phone]   option #4 and ask to speak to the on-call Cardiologist. Inform them you are a CORE/heart failure patient at the [facility].        If you need a medication refill please contact your pharmacy.  Please allow 3 business days for your refill to be completed.  _______________________________________________________  C.O.R.E[facility] CLINIC Cardiomyopathy, Optimization, Rehabilitation, Education   The C.O.R.E. CLINIC is a heart failure specialty clinic within the [facility] where you will work with specialized nurse practitioners dedicated to helping patients with heart failure carefully adjust medications, receive education, and learn who and when to call if symptoms develop. They specialize in helping you better understand your condition, slow the progression of your disease, improve the length and quality of your life, help you detect future " heart problems before they become life threatening, and avoid hospitalizations.  As always, thank you for trusting us with your health care needs!

## 2017-08-30 NOTE — LETTER
8/30/2017      RE: Betty Tee  [address]  [facility] [address]       Dear Colleague,    Thank you for the opportunity to participate in the care of your patient, Betty Tee, at the [facility] at [facility]. Please see a copy of my visit note below.    HPI:   Ms. 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  She was hospitalized in 4/17 for HCAP, Influenza, and Respiratory failure. She continues to use her oxygen when symptomatic at home at 3L NC. She presents to [facility] clinic for routine follow up.     She is ambulating without oxygen today and was able to walk into the building without needing rest. She complains of orthopnea using multiple pillows at . She complains of intermittent sweats and profuse night sweats for the past few weeks, but has not checked her temperature. She denies fever, chills, lightheadedness, dizziness, chest pain, palpitations, SOB, KIMBLE, nausea, vomiting, diarrhea, hematochezia, melena, or LE edema. His weight remains stable at 200-201 lbs. She attempts to maintain a low sodium diet, but admits to eating pork chops with sauerkraut yesterday.       PAST MEDICAL HISTORY:  Past Medical History:   Diagnosis Date     Alcohol abuse, in remission      Allergic rhinitis, cause unspecified     allegra helps when she takes it     Antiplatelet or antithrombotic long-term use      Atrial fibrillation (H)     in hosp in 11/11 after surgery w/ fluid overload     Cardiomegaly     LVH on stress echo- cardiac w/u at [facility] ER- neg CT scan for PE, neg stress echo in 8/06     Chest pain, unspecified      Disorder of bone and cartilage, unspecified     osteopenia (had been on prempro),  improved on 6/06 dexa, stable dexa 11/10     Diverticulosis of colon (without mention of hemorrhage)     last episode yrs ago     Essential hypertension, benign      Gastro-oesophageal reflux disease      Insomnia, unspecified     weaned off clonazepam     Irregular heart beat      Lumbago 7/09    MRI with NEAL, now seeing Dr. Cain for sciatic sx's     Major depressive disorder, recurrent episode, moderate (H)      Obstructive sleep apnea      Osteoarthrosis, unspecified whether generalized or localized, unspecified site      Sjogren's syndrome (H)      Sleep apnea      Tobacco use disorder     chantix in 9/07, started again in 6/08, working       FAMILY HISTORY:  Family History   Problem Relation Age of Onset     C.A.D. Mother 63     MI- first at age 63     HEART DISEASE Mother      Hypertension Mother      CEREBROVASCULAR DISEASE Mother      Hyperlipidemia Mother      Alcohol/Drug Father      Alzheimer Disease Father      Dementia Father      Hypertension Father      Hyperlipidemia Father      C.A.D. Sister 52     Minor MI- age 50's     HEART DISEASE Sister      Hypertension Sister      Hypertension Sister      Hypertension Brother      Cancer - colorectal Sister 48     Late 40's early 50's     Prostate Cancer Brother 74     Dx'd age 74     GASTROINTESTINAL DISEASE Sister      Diverticulitis     GASTROINTESTINAL DISEASE Brother      Diverticulitis     Lipids Sister      Lipids Sister      Parkinsonism Brother      DIABETES Sister      HEART DISEASE Sister      CHF     CANCER Sister      lung, smoker     Substance Abuse Sister      Substance Abuse Brother      Asthma Sister      CANCER Sister      Breast Cancer Daughter      Prostate Cancer Brother      Hyperlipidemia Brother        SOCIAL HISTORY:  Social History     Social History     Marital status: Single     Spouse name: N/A     Number of children: 0     Years of education: Ed Spec De     Occupational History     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     Social History Main Topics     Smoking status: Former Smoker     Packs/day: 0.50     Years: 10.00     Types: Cigarettes     Quit date: 8/1/2011     Smokeless tobacco: Never Used      Comment: 1/2 ppd     Alcohol use No      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     Drug use: No     Sexual activity: No     Other Topics Concern     Not on file     Social History Narrative    Social Documentation:        Balanced Diet: YES    Calcium intake: 1-2 per day    Caffeine: 4-5 cups per day    Exercise:  type of activity limited right now due to foot injury    Sunscreen: No    Seatbelts:  Yes    Self Breast Exam:  Yes    Self Testicular Exam: n/a    Physical/Emotional/Sexual Abuse: No    Do you feel safe in your environment? No-pt lives in [facility]        Cholesterol screen up to date: No-will check today    Eye Exam up to date: Yes    Dental Exam up to date: Yes    Pap smear up to date: Does Not Apply    Mammogram up to date: Yes-10/07    Dexa Scan up to date: Yes-2006    Colonoscopy up to date: Yes-2006    Immunizations up to date: Yes-td 2002    Glucose screen if over 40:  Yes    '09                       CURRENT MEDICATIONS:  Outpatient Medications Prior to Visit   Medication Sig Dispense Refill     furosemide (LASIX) 40 MG tablet Take 1 tablet (40 mg) by mouth 2 times daily 180 tablet 3     NOVOLOG FLEXPEN 100 UNIT/ML soln INJECT 12 UNITS SUBCUTANEOUS 3 TIMES DAILY (WITH MEALS) 15 mL 1     PARoxetine (PAXIL) 10 MG tablet TAKE 1 TABLET (10MG) BY MOUTH AT BEDTIME 30 tablet 1     atorvastatin (LIPITOR) 20 MG tablet Take 1 tablet (20 mg) by mouth daily 90 tablet 0     montelukast (SINGULAIR) 10 MG tablet Take 1 tablet (10 mg) by mouth At Bedtime 90 tablet 1     warfarin (COUMADIN) 7.5 MG tablet Current dose is 7.5 mg daily.  Dose adjusted per INR result. 100 tablet 0     potassium chloride SA (K-DUR/KLOR-CON M) 20 MEQ CR tablet Take 2 tablets (40 mEq) by mouth daily 180 tablet 3      spironolactone (ALDACTONE) 25 MG tablet Take 2 tablets (50 mg) by mouth daily 180 tablet 3     traZODone (DESYREL) 50 MG tablet TAKE 1/2-1 TABLET BY MOUTH NIGHTLY AS NEEDED, CAN TITRATE DOWN TO 1/2TAB OR UP TO 2TABS AS NEEDED 90 tablet 1     PARoxetine (PAXIL) 20 MG tablet TAKE 1 TABLET (10mg) BY MOUTH AT BEDTIME (Patient taking differently: Take 10 mg by mouth TAKE 1 TABLET (10mg) BY MOUTH AT BEDTIME) 20 tablet 3     ferrous sulfate (IRON) 325 (65 FE) MG tablet Take 1 tablet (325 mg) by mouth daily (with breakfast) 30 tablet 3     LANTUS SOLOSTAR 100 UNIT/ML soln INJECT 25 UNTIS SUBCUTANEOUSLY EVERY DAY 15 mL 1     lisinopril (PRINIVIL/ZESTRIL) 2.5 MG tablet Take 1 tablet (2.5 mg) by mouth daily 90 tablet 1     lactobacillus rhamnosus, GG, (CULTURELL) capsule Take 1 capsule by mouth 3 times daily (before meals) 90 capsule 1     blood glucose monitoring (NO BRAND SPECIFIED) test strip Use to test blood sugars 4 times daily or as directed 300 strip 3     blood glucose monitoring (ACCU-CHEK MULTICLIX) lancets Use to test blood sugar 4 times daily or as directed. 4 Box 3     insulin pen needle (B-D U/F) 31G X 8 MM Use 4 times daily (with Lantus and Novolog) or as directed. 400 each 3     insulin pen needle (BD JANE U/F) 32G X 4 MM Use 4 daily as directed. 200 each 11     blood glucose monitoring (ACCU-CHEK SHANNON PLUS) test strip Use to test blood sugar 4 times daily or as directed.  Ok to substitute alternative if insurance prefers. 120 strip 11     blood glucose monitoring (ACCU-CHEK FASTCLIX) lancets Use to test blood sugar 4 times daily or as directed.  Ok to substitute alternative if insurance prefers. 1 Box 11     predniSONE (DELTASONE) 10 MG tablet Take 2 tablets for three days (4/12-14), then take 1 tablet daily (10 mg daily starting 4/15) 90 tablet 3     metFORMIN (GLUCOPHAGE-XR) 500 MG 24 hr tablet Take 2 tablets (1,000 mg) by mouth daily (with dinner) 180 tablet 0     order for DME Oxygen: Patient requires  supplemental Oxygen 4 LPM via nasal canula with activity. Please provide patient with POC to improve mobility, okay to titrate for conserving to keep stats above 90%. Please fax results to [phone].  Oxygen will be for a lifetime. 1 Device 0     pantoprazole (PROTONIX) 20 MG EC tablet Take 1-2 tablets (20-40 mg) by mouth daily 60 tablet 1     ketoconazole (NIZORAL) 2 % cream Apply topically 2 times daily 60 g 1     metoprolol (TOPROL-XL) 100 MG 24 hr tablet Take 1 tablet (100 mg) by mouth daily 90 tablet 3     albuterol (PROAIR HFA, PROVENTIL HFA, VENTOLIN HFA) 108 (90 BASE) MCG/ACT inhaler Inhale 2 puffs into the lungs every 6 hours 1 Inhaler 3     order for DME Oxygen: Patient requires supplemental Oxygen 4 LPM via nasal canula with activity. Please provide patient with a home unit and with portability capability. Oxygen will be for a lifetime. 1 Device 0     polyethylene glycol (MIRALAX/GLYCOLAX) packet Take 17 g by mouth daily as needed for constipation 7 packet 0     acyclovir (ZOVIRAX) 5 % ointment Apply topically 6 times daily (Patient taking differently: Apply topically 6 times daily As needed for outbreaks) 15 g 3     fluticasone (FLOVENT HFA) 220 MCG/ACT inhaler Inhale 2 puffs into the lungs 2 times daily 3 Inhaler 3     acyclovir (ZOVIRAX) 400 MG tablet Take 400 mg by mouth See Admin Instructions 5 times daily as needed for outbreaks       fluticasone (FLONASE) 50 MCG/ACT nasal spray Spray 1-2 sprays into both nostrils daily (Patient taking differently: Spray 1-2 sprays into both nostrils daily as needed for allergies ) 16 g 5     COMPRESSION STOCKINGS Wear compression stockings in affected leg (right leg) or both legs most of the time during the day and take them off at night. 2 each 2     acetaminophen (TYLENOL) 500 MG tablet Take 500 mg by mouth nightly as needed        predniSONE (DELTASONE) 1 MG tablet Take 1 tablet (1 mg) by mouth daily (Patient not taking: Reported on 8/30/2017) 30 tablet 3      "pantoprazole (PROTONIX) 40 MG EC tablet Take 1 tablet (40 mg) by mouth daily (Patient not taking: Reported on 8/30/2017) 90 tablet 1     No facility-administered medications prior to visit.        ROS:   CONSTITUTIONAL: Denies fever, chills, fatigue, or weight fluctuations. Complains of night sweats.   HEENT: Denies headache, vision changes, and changes in speech.   CV: Refer to HPI.   PULMONARY:Denies shortness of breath, cough, or previous TB exposure.   GI:Denies nausea, vomiting, diarrhea, and abdominal pain. Bowel movements are regular.   :Denies urinary alterations, dysuria, urinary frequency, hematuria, and abnormal drainage.   EXT:Denies lower extremity edema.   SKIN:Denies abnormal rashes or lesions.   MUSCULOSKELETAL:Denies upper or lower extremity weakness and pain.   NEUROLOGIC:Denies lightheadedness, dizziness, seizures, or upper or lower extremity paresthesia.     EXAM:  /71 (BP Location: Left arm, Patient Position: Chair, Cuff Size: Adult Regular)  Pulse 78  Ht 1.676 m (5' 6\")  Wt 92.1 kg (203 lb)  SpO2 96%  BMI 32.77 kg/m2  GENERAL: Appears alert and oriented times three.   HEENT: Eye symmetrical and free of discharge bilaterally. Mucous membranes moist and without lesions.  NECK: Supple and without lymphadenopathy. JVD 8-9 cm.   CV: irregular, controlled. S1S2 present without murmur, rub, or gallop.   RESPIRATORY: Respirations regular, even, and unlabored. Lungs CTA throughout.   GI: Soft and non distended with normoactive bowel sounds present in all quadrants. No tenderness, rebound, guarding. No organomegaly.   EXTREMITIES: No peripheral edema. 2+ bilateral pedal pulses.   NEUROLOGIC: Alert and orientated x 3. CN II-XII grossly intact. No focal deficits.   MUSCULOSKELETAL: No joint swelling or tenderness.   SKIN: No jaundice. No rashes or lesions.     Labs:  CBC RESULTS:  Lab Results   Component Value Date    WBC 12.0 (H) 08/01/2017    RBC 4.84 08/01/2017    HGB 12.8 08/01/2017    HCT " 41.3 08/01/2017    MCV 85 08/01/2017    MCH 26.4 (L) 08/01/2017    MCHC 31.0 (L) 08/01/2017    RDW 25.1 (H) 08/01/2017     08/01/2017       CMP RESULTS:  Lab Results   Component Value Date     08/01/2017    POTASSIUM 3.5 08/01/2017    CHLORIDE 104 08/01/2017    CO2 31 08/01/2017    ANIONGAP 4 08/01/2017    GLC 98 08/01/2017    BUN 21 08/01/2017    CR 0.90 08/01/2017    GFRESTIMATED 61 08/01/2017    GFRESTBLACK 74 08/01/2017    CLAUDY 9.0 08/01/2017    BILITOTAL 0.5 08/01/2017    ALBUMIN 3.2 (L) 08/01/2017    ALKPHOS 139 08/01/2017    ALT 45 08/01/2017    AST 40 08/01/2017        INR RESULTS:  Lab Results   Component Value Date    INR 2.0 (A) 08/01/2017    INR 1.67 (H) 06/29/2017       Lab Results   Component Value Date    MAG 2.0 04/11/2017     Lab Results   Component Value Date    NTBNPI 3531 (H) 04/06/2017     Lab Results   Component Value Date    NTBNP 711 (H) 06/22/2017     Echocardiogram 4/6/17:  EF 60-65%, no valvular abnormalities, normal IVC, mid anteroseptal akinesis, and normal RV function.     NM Lexiscan 6/5/14:  IMPRESSION:  1. Normal myocardial SPECT study with a summed stress score of 2. A  summed stress score of less than 4 is associated with an annual event  rate of 0.5% and 0.3% for myocardial infarction and cardiac death,  respectively (Caro. Circulation 1998;98:535-43).  2. No significant perfusion abnormalities.  3. Hyperdynamic left ventricular systolic function as described above.  4. No prior study available for comparison.    Assessment and Plan:   Ms. 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 She presents to [facility] clinic for routine follow up.      Heart failure with preserved ejection fraction.   ACC Functional Class  III  Rate control: HR-78. Toprol  mg po daily   volume status: Euvolemic on exam. Continue Lasix 40 mg BID.   Blood pressure control: BP controlled at 102/71. Lisinopril and Toprol XL.   Aldosterone antagonist: Aldactone 50 mg po daily   Evaluation of coronary arteries: Lexiscan negative for ischemia 6/14  Sleep apnea screening: ANGELICA on CPAP     HTN.   - BP controlled on Toprol  mg po daily and Lisinopril 2.5 mg po daily.      Afib. Rate controlled. Managed per EP. CHADSVASC-5.    - Continue Toprol  mg po daily.    - Coumadin per Coumadin clinic.      ILD and Follicular Bronchiolitis  - Management per Pulmonary.      GERD with PUD. History of GI bleed secondary to diverticular bleed 6/28/16 resolved with oral Vitamin K.    - Protonix to 40 mg po BID.      Sjogren's Disease.   - Management per Rheumatology.     Night sweats. Concern for infectious etiology given current complaint of symptoms vs malignant etiology. Rule out infection. If negative refer to PCP fur further evaluation.   - CBC with diff.   - Blood cultures times 2.   - UA/UC given recent UTI treatment.   - Defer stool culture given no loose stools, travel, or change in diet.   - Defer Chest X-ray given asymptomatic, but low threshold given recurrent pneumonia and chronic ILD with mucous plugging.     Follow up with Dr. Rosa as scheduled 10/26/17.     Kiesha Elias  8/30/2017          JOSE LUIS ANAYA

## 2017-08-30 NOTE — NURSING NOTE
Diet: Patient instructed regarding a heart failure healthy diet, including discussion of reduced fat and 2000 mg daily sodium restriction, daily weights, medication purpose and compliance, fluid restrictions and resources for patient and family to access for assistance with heart failure management.       Labs: Patient was given results of the laboratory testing obtained today and patient was instructed about when to return for the next laboratory testing.  1. BMP and BNP were drawn prior to appt; however, there was a delay with processing and these were not available prior to appointment; however, Kiesha requests additional labs to be drawn and patient was returning to lab after appointment to have these drawn.     Med Reconcile: Reviewed and verified all current medications with the patient. The updated medication list was printed and given to the patient.  1. There are no medication changes today.    Return Appointment: Patient given instructions regarding scheduling next clinic visit.   1. Patient has no C[facility] visit arranged until f/u with Dr. Rosa on 10/26.     Patient stated that she understood all health information given and agreed to call with further questions or concerns.    Patient attended appointment with her friend Nghia. They were both given a HF booklet which was reviewed. Writer called Kenzie at patient and provider request to have excess oxygen tanks picked up.  She apparently has at least 8 on her patio accumulating and was told by Kenzie that a provider would need to approve this.  However, when writer called Kenzie, they had misunderstood initial request and only need provider to sign off if patient is discontinuing oxygen which is not the case.  They will make arrangements to .

## 2017-08-30 NOTE — MR AVS SNAPSHOT
"              After Visit Summary   2017    Betty Tee    MRN: [id number]           Patient Information     Date Of Birth          1940        Visit Information        Provider Department      2017 10:00 AM Kiesha Elias APRN CNP [facility]        Today's Diagnoses     Night sweats    -  1    Urinary tract infection, site unspecified          Care Instructions    You were seen today in the Cardiovascular Clinic at the [facility].     Cardiology Providers you saw during your visit: Kiesha Elias NP     1. We will call your oxygen company to find out what is needed for  oxygen tank retrieval.    2.  Please return downstairs for labs for additional blood work.    3. Please keep your follow-up appointment with Dr. Rosa.    Please limit your fluid intake to 2 L (64 ounces) daily.  2 Liters a day = 8.5 cups, or 72 ounces.  Please limit your salt intake to 2 grams a day or less.    If you gain 2# in 24 hours or 5# in one week call Christianne Bee RN so we can adjust your medications as needed over the phone.    Please feel free to call me with any questions or concerns.      Christianne Bee RN BSN [facility]  Cardiology Care Coordinator-Heart Failure Clinic    Questions and [phone].   First press #1 for the [facility] and then press #3 for \"Medical Questions\" to reach the Cardiology Nurses.     On Call Cardiologist for after hours or on weekends: [phone]   option #4 and ask to speak to the on-call Cardiologist. Inform them you are a CORE/heart failure patient at the [facility].        If you need a medication refill please contact your pharmacy.  Please allow 3 business days for your refill to be completed.  _______________________________________________________  C.O.R.E. CLINIC Cardiomyopathy, Optimization, Rehabilitation, Education   The C.O.R.E. CLINIC is a heart failure specialty clinic within the [facility] " [facility] where you will work with specialized nurse practitioners dedicated to helping patients with heart failure carefully adjust medications, receive education, and learn who and when to call if symptoms develop. They specialize in helping you better understand your condition, slow the progression of your disease, improve the length and quality of your life, help you detect future heart problems before they become life threatening, and avoid hospitalizations.  As always, thank you for trusting us with your health care needs!                  Follow-ups after your visit        Your next 10 appointments already scheduled     Oct 04, 2017 11:00 AM CDT   (Arrive by 10:45 AM)   Return Interstitial Lung with Meño Walsh MD   [facility] ([facility])    [address] [place] [id number] [phone]            Oct 06, 2017  1:00 PM CDT   Office Visit with Ilene Tristan MD   [facility] ([facility])    [address] [facility] [id number] [phone]           Bring a current list of meds and any records pertaining to this visit. For Physicals, please bring immunization records and any forms needing to be filled out. Please arrive 10 minutes early to complete paperwork.            Oct 26, 2017  2:30 PM CDT   Lab with  LAB   [facility] ([facility])    [address] [place] [id number] [phone]            Oct 26, 2017  3:00 PM CDT   (Arrive by 2:45 PM)   RETURN HEART FAILURE with Radha Rosa MD   [facility] ([facility])    [address] [place] [id number] [phone]              Future tests that were ordered for you today     Open Future Orders        [facility] Expected Expires Ordered    UA with Microscopic Routine 8/31/2017 8/31/2018  "8/30/2017    Urine Culture Aerobic Bacterial Routine  8/31/2017 8/30/2017    Blood culture Routine  8/31/2017 8/30/2017    Blood culture Routine 8/30/2017 8/31/2017 8/30/2017    CBC with platelets differential Routine 8/31/2017 8/31/2018 8/30/2017    INR Routine  8/31/2018 8/30/2017            Who to contact     If you have questions or need follow up information about today's clinic visit or your schedule please contact [facility] directly at [phone].  Normal or non-critical lab and imaging results will be communicated to you by "[employer]hart, letter or phone within 4 business days after the clinic has received the results. If you do not hear from us within 7 days, please contact the clinic through [employer] or phone. If you have a critical or abnormal lab result, we will notify you by phone as soon as possible.  Submit refill requests through [employer] or call your pharmacy and they will forward the refill request to us. Please allow 3 business days for your refill to be completed.          Additional Information About Your Visit        "[employer] Information     [employer] gives you secure access to your electronic health record. If you see a primary care provider, you can also send messages to your care team and make appointments. If you have questions, please call your primary care clinic.  If you do not have a primary care provider, please call [phone] and they will assist you.        Care EveryWhere ID     This is your Care EveryWhere ID. This could be used by other organizations to access your [facility] medical records  [id number]        Your Vitals Were     Pulse Height Pulse Oximetry BMI (Body Mass Index)          78 1.676 m (5' 6\") 96% 32.77 kg/m2         Blood Pressure from Last 3 Encounters:   08/30/17 102/71   08/01/17 128/78   07/05/17 110/60    Weight from Last 3 Encounters:   08/30/17 92.1 kg (203 lb)   08/01/17 93 kg (205 lb)   07/05/17 91.2 kg (201 lb)                 Today's Medication " Changes          These changes are accurate as of: 8/30/17 11:05 AM.  If you have any questions, ask your nurse or doctor.               These medicines have changed or have updated prescriptions.        Dose/Directions    acyclovir 5 % ointment   Commonly known as:  ZOVIRAX   This may have changed:  additional instructions   Used for:  Recurrent cold sores        Apply topically 6 times daily   Quantity:  15 g   Refills:  3       fluticasone 50 MCG/ACT spray   Commonly known as:  FLONASE   This may have changed:    - when to take this  - reasons to take this   Used for:  Seasonal allergic rhinitis        Dose:  1-2 spray   Spray 1-2 sprays into both nostrils daily   Quantity:  16 g   Refills:  5       * PARoxetine 20 MG tablet   Commonly known as:  PAXIL   This may have changed:    - how much to take  - how to take this  - additional instructions   Used for:  Major depressive disorder, recurrent episode, moderate (H)   Changed by:  Ilene Tristan MD        TAKE 1 TABLET (10mg) BY MOUTH AT BEDTIME   Quantity:  20 tablet   Refills:  3       * PARoxetine 10 MG tablet   Commonly known as:  PAXIL   This may have changed:  Another medication with the same name was changed. Make sure you understand how and when to take each.   Used for:  Major depressive disorder, recurrent episode, moderate (H)   Changed by:  Ilene Tristan MD        TAKE 1 TABLET (10MG) BY MOUTH AT BEDTIME   Quantity:  30 tablet   Refills:  1       predniSONE 10 MG tablet   Commonly known as:  DELTASONE   This may have changed:  Another medication with the same name was removed. Continue taking this medication, and follow the directions you see here.   Used for:  ILD (interstitial lung disease) (H), Sjogren's syndrome (H)        Take 2 tablets for three days (4/12-14), then take 1 tablet daily (10 mg daily starting 4/15)   Quantity:  90 tablet   Refills:  3       * Notice:  This list has 2 medication(s) that are the same as other  medications prescribed for you. Read the directions carefully, and ask your doctor or other care provider to review them with you.             Primary Care Provider Office Phone # Fax #    Ilene Tristan [phone] [address] [address]        Equal Access to Services     GENNY STONER : Hadii aad ku hadasho Soomaali, waaxda luqadaha, qaybta kaalmada adeegyada, waxay hannain haydeidran adestas theresachan shaikh. So [facility] [phone].    ATENCIÓN: Si habla español, tiene a conti disposición servicios gratuitos de asistencia lingüística. [facility] [phone].    We comply with applicable federal civil rights laws and [place] laws. We do not discriminate on the basis of race, color, national origin, age, disability sex, sexual orientation or gender identity.            Thank you!     Thank you for choosing [facility]  for your care. Our goal is always to provide you with excellent care. Hearing back from our patients is one way we can continue to improve our services. Please take a few minutes to complete the written survey that you may receive in the mail after your visit with us. Thank you!             Your Updated Medication List - Protect others around you: Learn how to safely use, store and throw away your medicines at www.disposemymeds.org.          This list is accurate as of: 8/30/17 11:05 AM.  Always use your most recent med list.                   Brand Name Dispense Instructions for use Diagnosis    acyclovir 400 MG tablet    ZOVIRAX     Take 400 mg by mouth See Admin Instructions 5 times daily as needed for outbreaks        acyclovir 5 % ointment    ZOVIRAX    15 g    Apply topically 6 times daily    Recurrent cold sores       albuterol 108 (90 BASE) MCG/ACT Inhaler    PROAIR HFA/PROVENTIL HFA/VENTOLIN HFA    1 Inhaler    Inhale 2 puffs into the lungs every 6 hours    ILD (interstitial lung disease) (H)       atorvastatin 20 MG tablet    LIPITOR    90  tablet    Take 1 tablet (20 mg) by mouth daily    Type 2 diabetes mellitus with other specified complication (H)       * blood glucose monitoring lancets     4 Box    Use to test blood sugar 4 times daily or as directed.    Type 2 diabetes mellitus without complication, without long-term current use of insulin (H)       * blood glucose monitoring lancets     1 Box    Use to test blood sugar 4 times daily or as directed.  Ok to substitute alternative if insurance prefers.    Type 2 diabetes mellitus without complication, without long-term current use of insulin (H)       * blood glucose monitoring test strip    no brand specified    300 strip    Use to test blood sugars 4 times daily or as directed    Type 2 diabetes mellitus without complication, without long-term current use of insulin (H)       * blood glucose monitoring test strip    ACCU-CHEK SHANNON PLUS    120 strip    Use to test blood sugar 4 times daily or as directed.  Ok to substitute alternative if insurance prefers.    Type 2 diabetes mellitus without complication, without long-term current use of insulin (H)       COMPRESSION STOCKINGS     2 each    Wear compression stockings in affected leg (right leg) or both legs most of the time during the day and take them off at night.    DVT (deep venous thrombosis), right, Postphlebitic syndrome, Chronic anticoagulation, Atrial fibrillation (H)       ferrous sulfate 325 (65 FE) MG tablet    IRON    30 tablet    Take 1 tablet (325 mg) by mouth daily (with breakfast)    Iron deficiency anemia due to chronic blood loss       fluticasone 220 MCG/ACT Inhaler    FLOVENT HFA    3 Inhaler    Inhale 2 puffs into the lungs 2 times daily    ILD (interstitial lung disease) (H), Follicular bronchiolitis (H)       fluticasone 50 MCG/ACT spray    FLONASE    16 g    Spray 1-2 sprays into both nostrils daily    Seasonal allergic rhinitis       furosemide 40 MG tablet    LASIX    180 tablet    Take 1 tablet (40 mg) by mouth 2  times daily    (HFpEF) heart failure with preserved ejection fraction (H)       * insulin pen needle 31G X 8 MM    B-D U/F    400 each    Use 4 times daily (with Lantus and Novolog) or as directed.    Type 2 diabetes mellitus without complication, without long-term current use of insulin (H)       * insulin pen needle 32G X 4 MM    BD JANE U/F    200 each    Use 4 daily as directed.    Type 2 diabetes mellitus without complication, without long-term current use of insulin (H)       ketoconazole 2 % cream    NIZORAL    60 g    Apply topically 2 times daily    Cutaneous candidiasis       lactobacillus rhamnosus (GG) capsule     90 capsule    Take 1 capsule by mouth 3 times daily (before meals)    Pneumonia due to infectious organism, unspecified laterality, unspecified part of lung       LANTUS SOLOSTAR 100 UNIT/ML injection   Generic drug:  insulin glargine     15 mL    INJECT 25 UNTIS SUBCUTANEOUSLY EVERY DAY    Type 2 diabetes mellitus with hyperglycemia, with long-term current use of insulin (H)       lisinopril 2.5 MG tablet    PRINIVIL/Zestril    90 tablet    Take 1 tablet (2.5 mg) by mouth daily    Essential hypertension with goal blood pressure less than 140/90       metFORMIN 500 MG 24 hr tablet    GLUCOPHAGE-XR    180 tablet    Take 2 tablets (1,000 mg) by mouth daily (with dinner)    Type 2 diabetes mellitus without complication, without long-term current use of insulin (H)       metoprolol 100 MG 24 hr tablet    TOPROL-XL    90 tablet    Take 1 tablet (100 mg) by mouth daily    Atrial fibrillation with controlled ventricular response (H)       montelukast 10 MG tablet    SINGULAIR    90 tablet    Take 1 tablet (10 mg) by mouth At Bedtime    Sjogren's syndrome with lung involvement (H), ILD (interstitial lung disease) (H), Chronic seasonal allergic rhinitis due to other allergen       NovoLOG FLEXPEN 100 UNIT/ML injection   Generic drug:  insulin aspart     15 mL    INJECT 12 UNITS SUBCUTANEOUS 3 TIMES  DAILY (WITH MEALS)    Type 2 diabetes mellitus with other specified complication (H)       * order for DME     1 Device    Oxygen: Patient requires supplemental Oxygen 4 LPM via nasal canula with activity. Please provide patient with a home unit and with portability capability. Oxygen will be for a lifetime.    Hypoxia, ILD (interstitial lung disease) (H)       * order for DME     1 Device    Oxygen: Patient requires supplemental Oxygen 4 LPM via nasal canula with activity. Please provide patient with POC to improve mobility, okay to titrate for conserving to keep stats above 90%. Please fax results to [phone].  Oxygen will be for a lifetime.    Hypoxia, ILD (interstitial lung disease) (H)       * pantoprazole 20 MG EC tablet    PROTONIX    60 tablet    Take 1-2 tablets (20-40 mg) by mouth daily    Gastroesophageal reflux disease without esophagitis       * pantoprazole 40 MG EC tablet    PROTONIX    90 tablet    Take 1 tablet (40 mg) by mouth daily    Gastroesophageal reflux disease without esophagitis       * PARoxetine 20 MG tablet    PAXIL    20 tablet    TAKE 1 TABLET (10mg) BY MOUTH AT BEDTIME    Major depressive disorder, recurrent episode, moderate (H)       * PARoxetine 10 MG tablet    PAXIL    30 tablet    TAKE 1 TABLET (10MG) BY MOUTH AT BEDTIME    Major depressive disorder, recurrent episode, moderate (H)       polyethylene glycol Packet    MIRALAX/GLYCOLAX    7 packet    Take 17 g by mouth daily as needed for constipation    Constipation, unspecified constipation type       potassium chloride SA 20 MEQ CR tablet    K-DUR/KLOR-CON M    180 tablet    Take 2 tablets (40 mEq) by mouth daily    Acute on chronic heart failure, unspecified heart failure type (H), Acute and chronic respiratory failure with hypoxia (H), (HFpEF) heart failure with preserved ejection fraction (H)       predniSONE 10 MG tablet    DELTASONE    90 tablet    Take 2 tablets for three days (4/12-14), then take 1 tablet daily (10 mg  daily starting 4/15)    ILD (interstitial lung disease) (H), Sjogren's syndrome (H)       spironolactone 25 MG tablet    ALDACTONE    180 tablet    Take 2 tablets (50 mg) by mouth daily    Chronic diastolic congestive heart failure (H)       traZODone 50 MG tablet    DESYREL    90 tablet    TAKE 1/2-1 TABLET BY MOUTH NIGHTLY AS NEEDED, CAN TITRATE DOWN TO 1/2TAB OR UP TO 2TABS AS NEEDED    Insomnia due to medical condition       TYLENOL 500 MG tablet   Generic drug:  acetaminophen      Take 500 mg by mouth nightly as needed    Dizziness       warfarin 7.5 MG tablet    COUMADIN    100 tablet    Current dose is 7.5 mg daily.  Dose adjusted per INR result.    Atrial fibrillation with controlled ventricular response (H)       * Notice:  This list has 12 medication(s) that are the same as other medications prescribed for you. Read the directions carefully, and ask your doctor or other care provider to review them with you.

## 2017-08-30 NOTE — NURSING NOTE
Chief Complaint   Patient presents with     New Patient     77 year old diastolic heart failure New CORE presenting for labs and eval     Vitals were taken and medications were reconciled.  Melvin Thomas, RMLEONEL  9:42 AM

## 2017-08-30 NOTE — PROGRESS NOTES
HPI:   Ms. 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  She was hospitalized in 4/17 for HCAP, Influenza, and Respiratory failure. She continues to use her oxygen when symptomatic at home at 3L [place]. [facility] for [facility] follow up.     She is ambulating without oxygen today and was able to walk into the building without needing rest. She complains of orthopnea using multiple pillows at HS. She complains of intermittent sweats and profuse night sweats for the past few weeks, but has not checked her temperature. She denies fever, chills, lightheadedness, dizziness, chest pain, palpitations, SOB, KIMBLE, nausea, vomiting, diarrhea, hematochezia, melena, or LE edema. His weight remains stable at 200-201 lbs. She attempts to maintain a low sodium diet, but admits to eating pork chops with sauerkraut yesterday.       PAST MEDICAL HISTORY:  Past Medical History:   Diagnosis Date     Alcohol abuse, in remission      Allergic rhinitis, cause unspecified     allegra helps when she takes it     Antiplatelet or antithrombotic long-term use      Atrial fibrillation (H)     in hosp in 11/11 after surgery w/ fluid overload     Cardiomegaly     LVH on stress echo- cardiac w/u at [facility] ER- neg CT scan for PE, neg stress echo in 8/06     Chest pain, unspecified      Disorder of bone and cartilage, unspecified     osteopenia (had been on prempro), improved on 6/06 dexa, stable dexa 11/10     Diverticulosis of colon (without mention of hemorrhage)     last episode yrs ago     Essential hypertension, benign      Gastro-oesophageal reflux disease      Insomnia, unspecified     weaned off clonazepam     Irregular heart beat      Lumbago 7/09    MRI with NEAL, now seeing   Gurin for sciatic sx's     Major depressive disorder, recurrent episode, moderate (H)      Obstructive sleep apnea      Osteoarthrosis, unspecified whether generalized or localized, unspecified site      Sjogren's syndrome (H)      Sleep apnea      Tobacco use disorder     chantix in 9/07, started again in 6/08, working       FAMILY HISTORY:  Family History   Problem Relation Age of Onset     C.A.D. Mother 63     MI- first at age 63     HEART DISEASE Mother      Hypertension Mother      CEREBROVASCULAR DISEASE Mother      Hyperlipidemia Mother      Alcohol/Drug Father      Alzheimer Disease Father      Dementia Father      Hypertension Father      Hyperlipidemia Father      C.A.D. Sister 52     Minor MI- age 50's     HEART DISEASE Sister      Hypertension Sister      Hypertension Sister      Hypertension Brother      Cancer - colorectal Sister 48     Late 40's early 50's     Prostate Cancer Brother 74     Dx'd age 74     GASTROINTESTINAL DISEASE Sister      Diverticulitis     GASTROINTESTINAL DISEASE Brother      Diverticulitis     Lipids Sister      Lipids Sister      Parkinsonism Brother      DIABETES Sister      HEART DISEASE Sister      CHF     CANCER Sister      lung, smoker     Substance Abuse Sister      Substance Abuse Brother      Asthma Sister      CANCER Sister      Breast Cancer Daughter      Prostate Cancer Brother      Hyperlipidemia Brother        SOCIAL HISTORY:  Social History     Social History     Marital status: Single     Spouse name: N/A     Number of children: 0     Years of education: Ed Spec De     Occupational History     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     Social History Main Topics     Smoking status: Former Smoker     Packs/day: 0.50     Years: 10.00     Types: Cigarettes     Quit date: 8/1/2011     Smokeless tobacco: Never Used      Comment: 1/2 ppd     Alcohol use No      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     Drug use: No      Sexual activity: No     Other Topics Concern     Not on file     Social History Narrative    Social Documentation:        Balanced Diet: YES    Calcium intake: 1-2 per day    Caffeine: 4-5 cups per day    Exercise:  type of activity limited right now due to foot injury    Sunscreen: No    Seatbelts:  Yes    Self Breast Exam:  Yes    Self Testicular Exam: n/a    Physical/Emotional/Sexual Abuse: No    Do you feel safe in your environment? No-pt lives in [facility]        Cholesterol screen up to date: No-will check today    Eye Exam up to date: Yes    Dental Exam up to date: Yes    Pap smear up to date: Does Not Apply    Mammogram up to date: Yes-10/07    Dexa Scan up to date: Yes-2006    Colonoscopy up to date: Yes-2006    Immunizations up to date: Yes-td 2002    Glucose screen if over 40:  Yes    '09                       CURRENT MEDICATIONS:  Outpatient Medications Prior to Visit   Medication Sig Dispense Refill     furosemide (LASIX) 40 MG tablet Take 1 tablet (40 mg) by mouth 2 times daily 180 tablet 3     NOVOLOG FLEXPEN 100 UNIT/ML soln INJECT 12 UNITS SUBCUTANEOUS 3 TIMES DAILY (WITH MEALS) 15 mL 1     PARoxetine (PAXIL) 10 MG tablet TAKE 1 TABLET (10MG) BY MOUTH AT BEDTIME 30 tablet 1     atorvastatin (LIPITOR) 20 MG tablet Take 1 tablet (20 mg) by mouth daily 90 tablet 0     montelukast (SINGULAIR) 10 MG tablet Take 1 tablet (10 mg) by mouth At Bedtime 90 tablet 1     warfarin (COUMADIN) 7.5 MG tablet Current dose is 7.5 mg daily.  Dose adjusted per INR result. 100 tablet 0     potassium chloride SA (K-DUR/KLOR-CON M) 20 MEQ CR tablet Take 2 tablets (40 mEq) by mouth daily 180 tablet 3     spironolactone (ALDACTONE) 25 MG tablet Take 2 tablets (50 mg) by mouth daily 180 tablet 3     traZODone (DESYREL) 50 MG tablet TAKE 1/2-1 TABLET BY MOUTH NIGHTLY AS NEEDED, CAN TITRATE DOWN TO 1/2TAB OR UP TO 2TABS AS NEEDED 90 tablet 1     PARoxetine (PAXIL) 20 MG tablet TAKE 1 TABLET (10mg) BY MOUTH AT BEDTIME (Patient  taking differently: Take 10 mg by mouth TAKE 1 TABLET (10mg) BY MOUTH AT BEDTIME) 20 tablet 3     ferrous sulfate (IRON) 325 (65 FE) MG tablet Take 1 tablet (325 mg) by mouth daily (with breakfast) 30 tablet 3     LANTUS SOLOSTAR 100 UNIT/ML soln INJECT 25 UNTIS SUBCUTANEOUSLY EVERY DAY 15 mL 1     lisinopril (PRINIVIL/ZESTRIL) 2.5 MG tablet Take 1 tablet (2.5 mg) by mouth daily 90 tablet 1     lactobacillus rhamnosus, GG, (CULTURELL) capsule Take 1 capsule by mouth 3 times daily (before meals) 90 capsule 1     blood glucose monitoring (NO BRAND SPECIFIED) test strip Use to test blood sugars 4 times daily or as directed 300 strip 3     blood glucose monitoring (ACCU-CHEK MULTICLIX) lancets Use to test blood sugar 4 times daily or as directed. 4 Box 3     insulin pen needle (B-D U/F) 31G X 8 MM Use 4 times daily (with Lantus and Novolog) or as directed. 400 each 3     insulin pen needle (BD JANE U/F) 32G X 4 MM Use 4 daily as directed. 200 each 11     blood glucose monitoring (ACCU-CHEK SHANNON PLUS) test strip Use to test blood sugar 4 times daily or as directed.  Ok to substitute alternative if insurance prefers. 120 strip 11     blood glucose monitoring (ACCU-CHEK FASTCLIX) lancets Use to test blood sugar 4 times daily or as directed.  Ok to substitute alternative if insurance prefers. 1 Box 11     predniSONE (DELTASONE) 10 MG tablet Take 2 tablets for three days (4/12-14), then take 1 tablet daily (10 mg daily starting 4/15) 90 tablet 3     metFORMIN (GLUCOPHAGE-XR) 500 MG 24 hr tablet Take 2 tablets (1,000 mg) by mouth daily (with dinner) 180 tablet 0     order for DME Oxygen: Patient requires supplemental Oxygen 4 LPM via nasal canula with activity. Please provide patient with POC to improve mobility, okay to titrate for conserving to keep stats above 90%. Please fax results to [phone].  Oxygen will be for a lifetime. 1 Device 0     pantoprazole (PROTONIX) 20 MG EC tablet Take 1-2 tablets (20-40 mg) by mouth  daily 60 tablet 1     ketoconazole (NIZORAL) 2 % cream Apply topically 2 times daily 60 g 1     metoprolol (TOPROL-XL) 100 MG 24 hr tablet Take 1 tablet (100 mg) by mouth daily 90 tablet 3     albuterol (PROAIR HFA, PROVENTIL HFA, VENTOLIN HFA) 108 (90 BASE) MCG/ACT inhaler Inhale 2 puffs into the lungs every 6 hours 1 Inhaler 3     order for DME Oxygen: Patient requires supplemental Oxygen 4 LPM via nasal canula with activity. Please provide patient with a home unit and with portability capability. Oxygen will be for a lifetime. 1 Device 0     polyethylene glycol (MIRALAX/GLYCOLAX) packet Take 17 g by mouth daily as needed for constipation 7 packet 0     acyclovir (ZOVIRAX) 5 % ointment Apply topically 6 times daily (Patient taking differently: Apply topically 6 times daily As needed for outbreaks) 15 g 3     fluticasone (FLOVENT HFA) 220 MCG/ACT inhaler Inhale 2 puffs into the lungs 2 times daily 3 Inhaler 3     acyclovir (ZOVIRAX) 400 MG tablet Take 400 mg by mouth See Admin Instructions 5 times daily as needed for outbreaks       fluticasone (FLONASE) 50 MCG/ACT nasal spray Spray 1-2 sprays into both nostrils daily (Patient taking differently: Spray 1-2 sprays into both nostrils daily as needed for allergies ) 16 g 5     COMPRESSION STOCKINGS Wear compression stockings in affected leg (right leg) or both legs most of the time during the day and take them off at night. 2 each 2     acetaminophen (TYLENOL) 500 MG tablet Take 500 mg by mouth nightly as needed        predniSONE (DELTASONE) 1 MG tablet Take 1 tablet (1 mg) by mouth daily (Patient not taking: Reported on 8/30/2017) 30 tablet 3     pantoprazole (PROTONIX) 40 MG EC tablet Take 1 tablet (40 mg) by mouth daily (Patient not taking: Reported on 8/30/2017) 90 tablet 1     No facility-administered medications prior to visit.        ROS:   CONSTITUTIONAL: Denies fever, chills, fatigue, or weight fluctuations. Complains of night sweats.   HEENT: Denies  "headache, vision changes, and changes in speech.   CV: Refer to HPI.   PULMONARY:Denies shortness of breath, cough, or previous TB exposure.   GI:Denies nausea, vomiting, diarrhea, and abdominal pain. Bowel movements are regular.   :Denies urinary alterations, dysuria, urinary frequency, hematuria, and abnormal drainage.   EXT:Denies lower extremity edema.   SKIN:Denies abnormal rashes or lesions.   MUSCULOSKELETAL:Denies upper or lower extremity weakness and pain.   NEUROLOGIC:Denies lightheadedness, dizziness, seizures, or upper or lower extremity paresthesia.     EXAM:  /71 (BP Location: Left arm, Patient Position: Chair, Cuff Size: Adult Regular)  Pulse 78  Ht 1.676 m (5' 6\")  Wt 92.1 kg (203 lb)  SpO2 96%  BMI 32.77 kg/m2  GENERAL: Appears alert and oriented times three.   HEENT: Eye symmetrical and free of discharge bilaterally. Mucous membranes moist and without lesions.  NECK: Supple and without lymphadenopathy. JVD 8-9 cm.   CV: irregular, controlled. S1S2 present without murmur, rub, or gallop.   RESPIRATORY: Respirations regular, even, and unlabored. Lungs CTA throughout.   GI: Soft and non distended with normoactive bowel sounds present in all quadrants. No tenderness, rebound, guarding. No organomegaly.   EXTREMITIES: No peripheral edema. 2+ bilateral pedal pulses.   NEUROLOGIC: Alert and orientated x 3. CN II-XII grossly intact. No focal deficits.   MUSCULOSKELETAL: No joint swelling or tenderness.   SKIN: No jaundice. No rashes or lesions.     Labs:  CBC RESULTS:  Lab Results   Component Value Date    WBC 12.0 (H) 08/01/2017    RBC 4.84 08/01/2017    HGB 12.8 08/01/2017    HCT 41.3 08/01/2017    MCV 85 08/01/2017    MCH 26.4 (L) 08/01/2017    MCHC 31.0 (L) 08/01/2017    RDW 25.1 (H) 08/01/2017     08/01/2017       CMP RESULTS:  Lab Results   Component Value Date     08/01/2017    POTASSIUM 3.5 08/01/2017    CHLORIDE 104 08/01/2017    CO2 31 08/01/2017    ANIONGAP 4 08/01/2017 "    GLC 98 08/01/2017    BUN 21 08/01/2017    CR 0.90 08/01/2017    GFRESTIMATED 61 08/01/2017    GFRESTBLACK 74 08/01/2017    CLAUDY 9.0 08/01/2017    BILITOTAL 0.5 08/01/2017    ALBUMIN 3.2 (L) 08/01/2017    ALKPHOS 139 08/01/2017    ALT 45 08/01/2017    AST 40 08/01/2017        INR RESULTS:  Lab Results   Component Value Date    INR 2.0 (A) 08/01/2017    INR 1.67 (H) 06/29/2017       Lab Results   Component Value Date    MAG 2.0 04/11/2017     Lab Results   Component Value Date    NTBNPI 3531 (H) 04/06/2017     Lab Results   Component Value Date    NTBNP 711 (H) 06/22/2017     Echocardiogram 4/6/17:  EF 60-65%, no valvular abnormalities, normal IVC, mid anteroseptal akinesis, and normal RV function.     NM Lexiscan 6/5/14:  IMPRESSION:  1. Normal myocardial SPECT study with a summed stress score of 2. A  summed stress score of less than 4 is associated with an annual event  rate of 0.5% and 0.3% for myocardial infarction and cardiac death,  respectively (Caro. Circulation 1998;98:535-43).  2. No significant perfusion abnormalities.  3. Hyperdynamic left ventricular systolic function as described above.  4. No prior study available for comparison.    Assessment and Plan:   Ms. 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 [facility] for [facility] follow up.      Heart failure with preserved ejection fraction.   ACC Functional Class III  Rate control: HR-78. Toprol  mg po daily   volume status: Euvolemic on exam. Continue Lasix 40 mg BID.   Blood pressure control: BP controlled at 102/71. Lisinopril and Toprol XL.   Aldosterone antagonist: Aldactone 50 mg po daily   Evaluation of coronary arteries: Lexiscan negative for ischemia 6/14  Sleep  apnea screening: ANGELICA on CPAP     HTN.   - BP controlled on Toprol  mg po daily and Lisinopril 2.5 mg po daily.      Afib. Rate controlled. Managed per EP. CHADSVASC-5.    - Continue Toprol  mg po daily.    - Coumadin per [facility]mad[facility] clinic.      ILD and Follicular Bronchiolitis  - Management per Pulmonary.      GERD with PUD. History of GI bleed secondary to diverticular bleed 6/28/16 resolved with oral Vitamin K.    - Protonix to 40 mg po BID.      Sjogren's Disease.   - Management per Rheumatology.     Night sweats. Concern for infectious etiology given current complaint of symptoms vs malignant etiology. Rule out infection. If negative refer to PCP fur further evaluation.   - CBC with diff.   - Blood cultures times 2.   - UA/UC given recent UTI treatment.   - Defer stool culture given no loose stools, travel, or change in diet.   - Defer Chest X-ray given asymptomatic, but low threshold given recurrent pneumonia and chronic ILD with mucous plugging.     Follow up with Dr. Rosa as scheduled 10/26/17.     Kiesha Elias  8/30/2017          JOSE LUIS ANAYA

## 2017-08-31 LAB
BACTERIA SPEC CULT: NORMAL
Lab: NORMAL
SPECIMEN SOURCE: NORMAL

## 2017-09-05 ENCOUNTER — CARE COORDINATION (OUTPATIENT)
Dept: CARDIOLOGY | Facility: CLINIC | Age: 77
End: 2017-09-05

## 2017-09-05 DIAGNOSIS — J06.9 UPPER RESPIRATORY INFECTION: Primary | ICD-10-CM

## 2017-09-05 LAB
BACTERIA SPEC CULT: NO GROWTH
BACTERIA SPEC CULT: NO GROWTH
SPECIMEN SOURCE: NORMAL
SPECIMEN SOURCE: NORMAL

## 2017-09-05 NOTE — PROGRESS NOTES
Date: 9/5/2017    Time of Call: 12:49 PM     Diagnosis:  Diastolic Heart Failure     [ VORB ] Ordering provider: Kiesha Elias NP    Order:   1. 2-view Chest Xray     Order received by: Christianne Bee, RN, BSN     Follow-up/additional notes:   Per provider to r/o respiratory infection vs. Malignant etiology, above order requested. Writer has left vm for patient requesting her to call writer or view Let it Wave message for specific recommendation to obtain chest xray as soon as she is able to arrange to do so

## 2017-09-08 DIAGNOSIS — E11.9 TYPE 2 DIABETES MELLITUS WITHOUT COMPLICATION, WITHOUT LONG-TERM CURRENT USE OF INSULIN (H): ICD-10-CM

## 2017-09-08 NOTE — TELEPHONE ENCOUNTER
metFORMIN (GLUCOPHAGE-XR) 500 MG 24 hr tablet         Last Written Prescription Date: 3/28/17  Last Fill Quantity: 180, # refills: 0  Last Office Visit with G, P or [facility] prescribing provider:  8/1/17   Next 5 appointments (look out 90 days)     Oct 06, 2017  1:00 PM CDT   Office Visit with Ilene Tristan MD   [facility] ([facility])    [address] [address] [phone]                   BP Readings from Last 3 Encounters:   08/30/17 102/71   08/01/17 128/78   07/05/17 110/60     Lab Results   Component Value Date    MICROL 10 09/20/2016     Lab Results   Component Value Date    UMALCR 21.13 09/20/2016     Creatinine   Date Value Ref Range Status   08/30/2017 0.88 0.52 - 1.04 mg/dL Final   ]  GFR Estimate   Date Value Ref Range Status   08/30/2017 62 >60 mL/min/1.7m2 Final     Comment:     Non  GFR Calc   08/01/2017 61 >60 mL/min/1.7m2 Final     Comment:     Non  GFR Calc   07/05/2017 68 >60 mL/min/1.7m2 Final     Comment:     Non  GFR Calc     GFR Estimate If Black   Date Value Ref Range Status   08/30/2017 75 >60 mL/min/1.7m2 Final     Comment:      GFR Calc   08/01/2017 74 >60 mL/min/1.7m2 Final     Comment:      GFR Calc   07/05/2017 82 >60 mL/min/1.7m2 Final     Comment:      GFR Calc     Lab Results   Component Value Date    CHOL 101 06/22/2017     Lab Results   Component Value Date    HDL 62 06/22/2017     Lab Results   Component Value Date    LDL 13 06/22/2017     Lab Results   Component Value Date    TRIG 132 06/22/2017     Lab Results   Component Value Date    CHOLHDLRATIO 2.5 08/06/2014     Lab Results   Component Value Date    AST 40 08/01/2017     Lab Results   Component Value Date    ALT 45 08/01/2017     Lab Results   Component Value Date    A1C 6.4 07/05/2017    A1C 14.3 04/04/2017    A1C 9.4 01/24/2017    A1C 7.0 09/02/2016    A1C  5.6 11/04/2011     Potassium   Date Value Ref Range Status   08/30/2017 3.8 3.4 - 5.3 mmol/L Final

## 2017-09-08 NOTE — PROGRESS NOTES
I called and spoke with Sister Nabila. Discussed lab results. Scheduled CXR for 9/11/17 at 1pm. Sister Nabila verbalizes understanding and agrees with plan of care. Sulema Wheeler RN

## 2017-09-11 ENCOUNTER — MYC MEDICAL ADVICE (OUTPATIENT)
Dept: FAMILY MEDICINE | Facility: CLINIC | Age: 77
End: 2017-09-11

## 2017-09-11 RX ORDER — METFORMIN HCL 500 MG
TABLET, EXTENDED RELEASE 24 HR ORAL
Qty: 180 TABLET | Refills: 0 | Status: SHIPPED | OUTPATIENT
Start: 2017-09-11 | End: 2017-12-30

## 2017-09-12 ENCOUNTER — OFFICE VISIT (OUTPATIENT)
Dept: FAMILY MEDICINE | Facility: CLINIC | Age: 77
End: 2017-09-12
Payer: MEDICARE

## 2017-09-12 VITALS
SYSTOLIC BLOOD PRESSURE: 90 MMHG | HEART RATE: 72 BPM | HEIGHT: 66 IN | BODY MASS INDEX: 33.11 KG/M2 | OXYGEN SATURATION: 95 % | DIASTOLIC BLOOD PRESSURE: 46 MMHG | WEIGHT: 206 LBS | TEMPERATURE: 98.3 F

## 2017-09-12 DIAGNOSIS — R19.7 DIARRHEA, UNSPECIFIED TYPE: Primary | ICD-10-CM

## 2017-09-12 DIAGNOSIS — R61 ABNORMAL FLUSHING AND SWEATING: ICD-10-CM

## 2017-09-12 DIAGNOSIS — R23.2 ABNORMAL FLUSHING AND SWEATING: ICD-10-CM

## 2017-09-12 LAB
BASOPHILS # BLD AUTO: 0.1 10E9/L (ref 0–0.2)
BASOPHILS NFR BLD AUTO: 0.5 %
DIFFERENTIAL METHOD BLD: ABNORMAL
EOSINOPHIL # BLD AUTO: 0.1 10E9/L (ref 0–0.7)
EOSINOPHIL NFR BLD AUTO: 1 %
ERYTHROCYTE [DISTWIDTH] IN BLOOD BY AUTOMATED COUNT: 19 % (ref 10–15)
HCT VFR BLD AUTO: 41.4 % (ref 35–47)
HGB BLD-MCNC: 13.1 G/DL (ref 11.7–15.7)
LYMPHOCYTES # BLD AUTO: 1.6 10E9/L (ref 0.8–5.3)
LYMPHOCYTES NFR BLD AUTO: 13.4 %
MCH RBC QN AUTO: 28.1 PG (ref 26.5–33)
MCHC RBC AUTO-ENTMCNC: 31.6 G/DL (ref 31.5–36.5)
MCV RBC AUTO: 89 FL (ref 78–100)
MONOCYTES # BLD AUTO: 1 10E9/L (ref 0–1.3)
MONOCYTES NFR BLD AUTO: 8.5 %
NEUTROPHILS # BLD AUTO: 8.9 10E9/L (ref 1.6–8.3)
NEUTROPHILS NFR BLD AUTO: 76.6 %
PLATELET # BLD AUTO: 244 10E9/L (ref 150–450)
RBC # BLD AUTO: 4.67 10E12/L (ref 3.8–5.2)
WBC # BLD AUTO: 11.6 10E9/L (ref 4–11)

## 2017-09-12 PROCEDURE — 36415 COLL VENOUS BLD VENIPUNCTURE: CPT | Performed by: FAMILY MEDICINE

## 2017-09-12 PROCEDURE — 85025 COMPLETE CBC W/AUTO DIFF WBC: CPT | Performed by: FAMILY MEDICINE

## 2017-09-12 PROCEDURE — 99214 OFFICE O/P EST MOD 30 MIN: CPT | Performed by: FAMILY MEDICINE

## 2017-09-12 NOTE — TELEPHONE ENCOUNTER
Definitely needs to be seen - could do 3:30pm today or 10:30am tomorrow...  For the weight gain, I think she's been working with the [facility] with cardiology...  Thanks,  CW

## 2017-09-12 NOTE — TELEPHONE ENCOUNTER
CW  Please see [employer] message below.  Do you want to see patient?  Telephone visit?  Please advise.  Thanks, Sophia Hemphill RN

## 2017-09-12 NOTE — MR AVS SNAPSHOT
After Visit Summary   9/12/2017    Betty Tee    MRN: [id number]           Patient Information     Date Of Birth          1940        Visit Information        Provider Department      9/12/2017 3:30 PM Ilene Tristan MD [facility]        Today's Diagnoses     Diarrhea, unspecified type    -  1    Abnormal flushing and sweating           Follow-ups after your visit        Your next 10 appointments already scheduled     Oct 04, 2017 11:00 AM CDT   (Arrive by 10:45 AM)   Return Interstitial Lung with Meño Walsh MD   [facility] ([facility])    [address] [place] [address]  3rd [facility] [id number] [phone]            Oct 06, 2017  1:00 PM CDT   Office Visit with Ilene Tristan MD   [facility] ([facility])    [address] [facility] [id number] [phone]           Bring a current list of meds and any records pertaining to this visit. For Physicals, please bring immunization records and any forms needing to be filled out. Please arrive 10 minutes early to complete paperwork.            Oct 26, 2017  2:30 PM CDT   Lab with  LAB   [facility] ([facility])    [address] [place] [id number] [phone]            Oct 26, 2017  3:00 PM CDT   (Arrive by 2:45 PM)   RETURN HEART FAILURE with Radha Rosa MD   [facility] ([facility])    [address] [place] [id number] [phone]              Future tests that were ordered for you today     Open Future Orders        [facility] Expected Expires Ordered    Clostridium difficile Toxin B PCR Routine  10/12/2017 9/12/2017            Who to contact     If you have questions or need follow up information about today's clinic visit or your schedule please contact [facility] directly  "at [phone].  Normal or non-critical lab and imaging results will be communicated to you by Dec[employer], letter or phone within 4 business days after the clinic has received the results. If you do not hear from us within 7 days, please contact the clinic through Decisyonhart or phone. If you have a critical or abnormal lab result, we will notify you by phone as soon as possible.  Submit refill requests through [employer] or call your pharmacy and they will forward the refill request to us. Please allow 3 business days for your refill to be completed.          Additional Information About Your Visit        Decisyonhart Information     [employer] gives you secure access to your electronic health record. If you see a primary care provider, you can also send messages to your care team and make appointments. If you have questions, please call your primary care clinic.  If you do not have a primary care provider, please call [phone] and they will assist you.        Care EveryWhere ID     This is your Care EveryWhere ID. This could be used by other organizations to access your [facility] medical records  [id number]        Your Vitals Were     Pulse Temperature Height Pulse Oximetry BMI (Body Mass Index)       72 98.3  F (36.8  C) (Oral) 5' 6\" (1.676 m) 95% 33.25 kg/m2        Blood Pressure from Last 3 Encounters:   09/12/17 90/46   08/30/17 102/71   08/01/17 128/78    Weight from Last 3 Encounters:   09/12/17 206 lb (93.4 kg)   08/30/17 203 lb (92.1 kg)   08/01/17 205 lb (93 kg)              We Performed the Following     CBC with platelets and differential          Today's Medication Changes          These changes are accurate as of: 9/12/17  5:09 PM.  If you have any questions, ask your nurse or doctor.               These medicines have changed or have updated prescriptions.        Dose/Directions    acyclovir 5 % ointment   Commonly known as:  ZOVIRAX   This may have changed:  additional instructions   Used for:  Recurrent cold " sores        Apply topically 6 times daily   Quantity:  15 g   Refills:  3       fluticasone 50 MCG/ACT spray   Commonly known as:  FLONASE   This may have changed:    - when to take this  - reasons to take this   Used for:  Seasonal allergic rhinitis        Dose:  1-2 spray   Spray 1-2 sprays into both nostrils daily   Quantity:  16 g   Refills:  5       order for DME   This may have changed:  Another medication with the same name was removed. Continue taking this medication, and follow the directions you see here.   Used for:  Hypoxia, ILD (interstitial lung disease) (H)   Changed by:  Tonia Graham MD        Oxygen: Patient requires supplemental Oxygen 4 LPM via nasal canula with activity. Please provide patient with a home unit and with portability capability. Oxygen will be for a lifetime.   Quantity:  1 Device   Refills:  0       pantoprazole 20 MG EC tablet   Commonly known as:  PROTONIX   This may have changed:  Another medication with the same name was removed. Continue taking this medication, and follow the directions you see here.   Used for:  Gastroesophageal reflux disease without esophagitis   Changed by:  Ilene Tristan MD        Dose:  20-40 mg   Take 1-2 tablets (20-40 mg) by mouth daily   Quantity:  60 tablet   Refills:  1       PARoxetine 10 MG tablet   Commonly known as:  PAXIL   This may have changed:  Another medication with the same name was removed. Continue taking this medication, and follow the directions you see here.   Used for:  Major depressive disorder, recurrent episode, moderate (H)   Changed by:  Ilene Tristan MD        TAKE 1 TABLET (10MG) BY MOUTH AT BEDTIME   Quantity:  30 tablet   Refills:  1                Primary Care Provider Office Phone # Fax #    Ilene Tristan [phone] [address] [address]        Equal Access to Services     GENNY STONER AH: rogelio Grimm,  anderson esposito ah. So [facility] [phone].    ATENCIÓN: Si jewel covarrubias, tiene a conti disposición servicios gratuitos de asistencia lingüística. Noel al [phone].    We comply with applicable federal civil rights laws and [place] laws. We do not discriminate on the basis of race, color, national origin, age, disability sex, sexual orientation or gender identity.            Thank you!     Thank you for choosing [facility]  for your care. Our goal is always to provide you with excellent care. Hearing back from our patients is one way we can continue to improve our services. Please take a few minutes to complete the written survey that you may receive in the mail after your visit with us. Thank you!             Your Updated Medication List - Protect others around you: Learn how to safely use, store and throw away your medicines at www.disposemymeds.org.          This list is accurate as of: 9/12/17  5:09 PM.  Always use your most recent med list.                   Brand Name Dispense Instructions for use Diagnosis    acyclovir 400 MG tablet    ZOVIRAX     Take 400 mg by mouth See Admin Instructions 5 times daily as needed for outbreaks        acyclovir 5 % ointment    ZOVIRAX    15 g    Apply topically 6 times daily    Recurrent cold sores       albuterol 108 (90 BASE) MCG/ACT Inhaler    PROAIR HFA/PROVENTIL HFA/VENTOLIN HFA    1 Inhaler    Inhale 2 puffs into the lungs every 6 hours    ILD (interstitial lung disease) (H)       atorvastatin 20 MG tablet    LIPITOR    90 tablet    Take 1 tablet (20 mg) by mouth daily    Type 2 diabetes mellitus with other specified complication (H)       * blood glucose monitoring lancets     4 Box    Use to test blood sugar 4 times daily or as directed.    Type 2 diabetes mellitus without complication, without long-term current use of insulin (H)       * blood glucose monitoring lancets     1 Box    Use to test blood  sugar 4 times daily or as directed.  Ok to substitute alternative if insurance prefers.    Type 2 diabetes mellitus without complication, without long-term current use of insulin (H)       * blood glucose monitoring test strip    no brand specified    300 strip    Use to test blood sugars 4 times daily or as directed    Type 2 diabetes mellitus without complication, without long-term current use of insulin (H)       * blood glucose monitoring test strip    ACCU-CHEK SHANNON PLUS    120 strip    Use to test blood sugar 4 times daily or as directed.  Ok to substitute alternative if insurance prefers.    Type 2 diabetes mellitus without complication, without long-term current use of insulin (H)       COMPRESSION STOCKINGS     2 each    Wear compression stockings in affected leg (right leg) or both legs most of the time during the day and take them off at night.    DVT (deep venous thrombosis), right, Postphlebitic syndrome, Chronic anticoagulation, Atrial fibrillation (H)       ferrous sulfate 325 (65 FE) MG tablet    IRON    30 tablet    Take 1 tablet (325 mg) by mouth daily (with breakfast)    Iron deficiency anemia due to chronic blood loss       fluticasone 220 MCG/ACT Inhaler    FLOVENT HFA    3 Inhaler    Inhale 2 puffs into the lungs 2 times daily    ILD (interstitial lung disease) (H), Follicular bronchiolitis (H)       fluticasone 50 MCG/ACT spray    FLONASE    16 g    Spray 1-2 sprays into both nostrils daily    Seasonal allergic rhinitis       furosemide 40 MG tablet    LASIX    180 tablet    Take 1 tablet (40 mg) by mouth 2 times daily    (HFpEF) heart failure with preserved ejection fraction (H)       * insulin pen needle 31G X 8 MM    B-D U/F    400 each    Use 4 times daily (with Lantus and Novolog) or as directed.    Type 2 diabetes mellitus without complication, without long-term current use of insulin (H)       * insulin pen needle 32G X 4 MM    BD JANE U/F    200 each    Use 4 daily as directed.     Type 2 diabetes mellitus without complication, without long-term current use of insulin (H)       ketoconazole 2 % cream    NIZORAL    60 g    Apply topically 2 times daily    Cutaneous candidiasis       lactobacillus rhamnosus (GG) capsule     90 capsule    Take 1 capsule by mouth 3 times daily (before meals)    Pneumonia due to infectious organism, unspecified laterality, unspecified part of lung       LANTUS SOLOSTAR 100 UNIT/ML injection   Generic drug:  insulin glargine     15 mL    INJECT 25 UNTIS SUBCUTANEOUSLY EVERY DAY    Type 2 diabetes mellitus with hyperglycemia, with long-term current use of insulin (H)       lisinopril 2.5 MG tablet    PRINIVIL/Zestril    90 tablet    Take 1 tablet (2.5 mg) by mouth daily    Essential hypertension with goal blood pressure less than 140/90       metFORMIN 500 MG 24 hr tablet    GLUCOPHAGE-XR    180 tablet    TAKE 2 TABLETS (1,000 MG) BY MOUTH DAILY (WITH DINNER)    Type 2 diabetes mellitus without complication, without long-term current use of insulin (H)       metoprolol 100 MG 24 hr tablet    TOPROL-XL    90 tablet    Take 1 tablet (100 mg) by mouth daily    Atrial fibrillation with controlled ventricular response (H)       montelukast 10 MG tablet    SINGULAIR    90 tablet    Take 1 tablet (10 mg) by mouth At Bedtime    Sjogren's syndrome with lung involvement (H), ILD (interstitial lung disease) (H), Chronic seasonal allergic rhinitis due to other allergen       NovoLOG FLEXPEN 100 UNIT/ML injection   Generic drug:  insulin aspart     15 mL    INJECT 12 UNITS SUBCUTANEOUS 3 TIMES DAILY (WITH MEALS)    Type 2 diabetes mellitus with other specified complication (H)       order for DME     1 Device    Oxygen: Patient requires supplemental Oxygen 4 LPM via nasal canula with activity. Please provide patient with a home unit and with portability capability. Oxygen will be for a lifetime.    Hypoxia, ILD (interstitial lung disease) (H)       pantoprazole 20 MG EC tablet     PROTONIX    60 tablet    Take 1-2 tablets (20-40 mg) by mouth daily    Gastroesophageal reflux disease without esophagitis       PARoxetine 10 MG tablet    PAXIL    30 tablet    TAKE 1 TABLET (10MG) BY MOUTH AT BEDTIME    Major depressive disorder, recurrent episode, moderate (H)       polyethylene glycol Packet    MIRALAX/GLYCOLAX    7 packet    Take 17 g by mouth daily as needed for constipation    Constipation, unspecified constipation type       potassium chloride SA 20 MEQ CR tablet    K-DUR/KLOR-CON M    180 tablet    Take 2 tablets (40 mEq) by mouth daily    Acute on chronic heart failure, unspecified heart failure type (H), Acute and chronic respiratory failure with hypoxia (H), (HFpEF) heart failure with preserved ejection fraction (H)       predniSONE 10 MG tablet    DELTASONE    90 tablet    Take 2 tablets for three days (4/12-14), then take 1 tablet daily (10 mg daily starting 4/15)    ILD (interstitial lung disease) (H), Sjogren's syndrome (H)       spironolactone 25 MG tablet    ALDACTONE    180 tablet    Take 2 tablets (50 mg) by mouth daily    Chronic diastolic congestive heart failure (H)       traZODone 50 MG tablet    DESYREL    90 tablet    TAKE 1/2-1 TABLET BY MOUTH NIGHTLY AS NEEDED, CAN TITRATE DOWN TO 1/2TAB OR UP TO 2TABS AS NEEDED    Insomnia due to medical condition       TYLENOL 500 MG tablet   Generic drug:  acetaminophen      Take 500 mg by mouth nightly as needed    Dizziness       warfarin 7.5 MG tablet    COUMADIN    100 tablet    Current dose is 7.5 mg daily.  Dose adjusted per INR result.    Atrial fibrillation with controlled ventricular response (H)       * Notice:  This list has 6 medication(s) that are the same as other medications prescribed for you. Read the directions carefully, and ask your doctor or other care provider to review them with you.

## 2017-09-12 NOTE — NURSING NOTE
"Chief Complaint   Patient presents with     Diarrhea       Initial BP 90/46 (BP Location: Left arm, Patient Position: Chair, Cuff Size: Adult Large)  Pulse 72  Temp 98.3  F (36.8  C) (Oral)  Ht 5' 6\" (1.676 m)  Wt 206 lb (93.4 kg)  SpO2 95%  BMI 33.25 kg/m2 Estimated body mass index is 33.25 kg/(m^2) as calculated from the following:    Height as of this encounter: 5' 6\" (1.676 m).    Weight as of this encounter: 206 lb (93.4 kg).  Medication Reconciliation: complete   Haydee Lopez MA  "

## 2017-09-12 NOTE — PROGRESS NOTES
SUBJECTIVE:   Betty Tee is a 77 year old female who presents to clinic today for the following health issues:    Diarrhea    Duration: X 2 weeks    Description:       Consistency of stool: watery and loose       Blood in stool: no        Number of loose stools past 24 hours: 4    Intensity:  mild    Accompanying signs and symptoms:       Fever: no        Nausea/vomitting: no        Abdominal pain: YES- Lower right       Weight loss: no     History (recent antibiotics or travel/ill contacts/med changes/testing done): No    Precipitating or alleviating factors: None    Therapies tried and outcome: Kaopectate      Stools sx's- last couple weeks.    Urgency in the morning.  Stools are a pale, whitish brown color.    Formed and then liquid.  Taking koapectate since it started.  Also sometimes vomiting in the morning- last couple wks, every few mornings.  Then feels okay- no nausea the rest of the day.  Pain in R lower abd- bad yesterday.  Better today.  Surgery has removed- uterus, ovaries, partial ilectomy.  Colonoscopy with Esther - 12/12- clear then.    Did get abx for possible UTI in ER in 6/17 (sx's only of elevated glucose readings- put on cipro) and then looks like she was given bactrim at her last visit here on 8/1/17.  She was given levaquin in 4/17 while hospitalized for pneumonia/ARF.    Seen in 8/1/17- for night sweats, odd urine sx's (hard to eliminate much despite high water intake, no pain or urgency).  Also seen by cardiology on 8/30/17 -did w/u to r/o infection of the blood.  Said to return for CXR due to the high WBC.  Labs- neg blood cx x 2, neg urine culture.  WBC higher, with higher neutrophils.  CXR yesterday- signs of ILD and potential mild pulmonary edema- no signs of infection or other concerning lesions.    Sweats- going on for month or two now.  May be getting worse.  Waking up wet- pillow/sheets wet.  Sweats all day today.  Weight up a bit- not down.  Energy - more tired than  usual.    Breathing-   Using oxygen - when she comes up from the basement, or after bending down.    Pulmonary f/u 10/4/17  Cardiology f/u on 10/26/17.    She's doing a study- new flu shot- wants pt to be in it.        DM-   Last A1c was 6.4  Glucose readings have been 90-120s.  None under 80.        Problem list and histories reviewed & adjusted, as indicated.  Additional history: as documented    Patient Active Problem List   Diagnosis     Temporomandibular joint disorder     Diverticulitis of colon     Disorder of bone and cartilage     Osteoarthritis     Alcohol abuse, in remission     Restless legs syndrome (RLS)     Major depressive disorder, recurrent episode, moderate     Essential hypertension with goal blood pressure less than 140/90     Sciatica     Advanced directives, counseling/discussion     Colouterine fistula     Atrial fibrillation with controlled ventricular response (H)     Left ventricular hypertrophy     Health Care Home     Insomnia     Joint pains     Allergic reaction caused by a drug - likely plaquenil     Breast fibroadenoma     DVT (deep venous thrombosis) (H)     Fatty liver disease, nonalcoholic     Rib pain     Neck mass     Gastroesophageal reflux disease without esophagitis     Enlarged lymph node     Sjogren's syndrome (H)     ANGELICA (obstructive sleep apnea)     ILD (interstitial lung disease) (H)     Lower GI bleed     Acute and chronic respiratory failure with hypoxia (H)     (HFpEF) heart failure with preserved ejection fraction (H)     Type 2 diabetes mellitus with hyperglycemia, with long-term current use of insulin (H)     Heart failure, chronic, with acute decompensation (H)     Hyperlipidemia LDL goal <100      Current Outpatient Prescriptions   Medication Sig Dispense Refill     metFORMIN (GLUCOPHAGE-XR) 500 MG 24 hr tablet TAKE 2 TABLETS (1,000 MG) BY MOUTH DAILY (WITH DINNER) 180 tablet 0     furosemide (LASIX) 40 MG tablet Take 1 tablet (40 mg) by mouth 2 times daily 180  tablet 3     NOVOLOG FLEXPEN 100 UNIT/ML soln INJECT 12 UNITS SUBCUTANEOUS 3 TIMES DAILY (WITH MEALS) 15 mL 1     PARoxetine (PAXIL) 10 MG tablet TAKE 1 TABLET (10MG) BY MOUTH AT BEDTIME 30 tablet 1     atorvastatin (LIPITOR) 20 MG tablet Take 1 tablet (20 mg) by mouth daily 90 tablet 0     montelukast (SINGULAIR) 10 MG tablet Take 1 tablet (10 mg) by mouth At Bedtime 90 tablet 1     warfarin (COUMADIN) 7.5 MG tablet Current dose is 7.5 mg daily.  Dose adjusted per INR result. 100 tablet 0     potassium chloride SA (K-DUR/KLOR-CON M) 20 MEQ CR tablet Take 2 tablets (40 mEq) by mouth daily 180 tablet 3     spironolactone (ALDACTONE) 25 MG tablet Take 2 tablets (50 mg) by mouth daily 180 tablet 3     traZODone (DESYREL) 50 MG tablet TAKE 1/2-1 TABLET BY MOUTH NIGHTLY AS NEEDED, CAN TITRATE DOWN TO 1/2TAB OR UP TO 2TABS AS NEEDED 90 tablet 1     ferrous sulfate (IRON) 325 (65 FE) MG tablet Take 1 tablet (325 mg) by mouth daily (with breakfast) 30 tablet 3     LANTUS SOLOSTAR 100 UNIT/ML soln INJECT 25 UNTIS SUBCUTANEOUSLY EVERY DAY 15 mL 1     lisinopril (PRINIVIL/ZESTRIL) 2.5 MG tablet Take 1 tablet (2.5 mg) by mouth daily 90 tablet 1     lactobacillus rhamnosus, GG, (CULTURELL) capsule Take 1 capsule by mouth 3 times daily (before meals) 90 capsule 1     blood glucose monitoring (NO BRAND SPECIFIED) test strip Use to test blood sugars 4 times daily or as directed 300 strip 3     blood glucose monitoring (ACCU-CHEK MULTICLIX) lancets Use to test blood sugar 4 times daily or as directed. 4 Box 3     insulin pen needle (B-D U/F) 31G X 8 MM Use 4 times daily (with Lantus and Novolog) or as directed. 400 each 3     insulin pen needle (BD JANE U/F) 32G X 4 MM Use 4 daily as directed. 200 each 11     blood glucose monitoring (ACCU-CHEK SHANNON PLUS) test strip Use to test blood sugar 4 times daily or as directed.  Ok to substitute alternative if insurance prefers. 120 strip 11     blood glucose monitoring (ACCU-CHEK FASTCLIX)  lancets Use to test blood sugar 4 times daily or as directed.  Ok to substitute alternative if insurance prefers. 1 Box 11     predniSONE (DELTASONE) 10 MG tablet Take 2 tablets for three days (4/12-14), then take 1 tablet daily (10 mg daily starting 4/15) 90 tablet 3     pantoprazole (PROTONIX) 20 MG EC tablet Take 1-2 tablets (20-40 mg) by mouth daily 60 tablet 1     ketoconazole (NIZORAL) 2 % cream Apply topically 2 times daily 60 g 1     metoprolol (TOPROL-XL) 100 MG 24 hr tablet Take 1 tablet (100 mg) by mouth daily 90 tablet 3     albuterol (PROAIR HFA, PROVENTIL HFA, VENTOLIN HFA) 108 (90 BASE) MCG/ACT inhaler Inhale 2 puffs into the lungs every 6 hours 1 Inhaler 3     polyethylene glycol (MIRALAX/GLYCOLAX) packet Take 17 g by mouth daily as needed for constipation 7 packet 0     acyclovir (ZOVIRAX) 5 % ointment Apply topically 6 times daily (Patient taking differently: Apply topically 6 times daily As needed for outbreaks) 15 g 3     fluticasone (FLOVENT HFA) 220 MCG/ACT inhaler Inhale 2 puffs into the lungs 2 times daily 3 Inhaler 3     acyclovir (ZOVIRAX) 400 MG tablet Take 400 mg by mouth See Admin Instructions 5 times daily as needed for outbreaks       fluticasone (FLONASE) 50 MCG/ACT nasal spray Spray 1-2 sprays into both nostrils daily (Patient taking differently: Spray 1-2 sprays into both nostrils daily as needed for allergies ) 16 g 5     COMPRESSION STOCKINGS Wear compression stockings in affected leg (right leg) or both legs most of the time during the day and take them off at night. 2 each 2     acetaminophen (TYLENOL) 500 MG tablet Take 500 mg by mouth nightly as needed        order for DME Oxygen: Patient requires supplemental Oxygen 4 LPM via nasal canula with activity. Please provide patient with a home unit and with portability capability. Oxygen will be for a lifetime. 1 Device 0     Allergies   Allergen Reactions     Augmentin Nausea and Vomiting     Codeine Nausea and Vomiting      "Phenobarbital Itching     Recent Labs   Lab Test  08/30/17   1045  08/01/17   1146  07/05/17   1233   06/22/17   1700   04/04/17   0650   04/02/17   2147  01/24/17   1338   11/03/16   1136  09/20/16   1045   08/06/14   1116   A1C   --    --   6.4*   --    --    --   14.3*   --    --   9.4*   --    --    --    < >   --    LDL   --    --    --    --   13   --    --    --    --    --    --    --   56   --   59   HDL   --    --    --    --   62   --    --    --    --    --    --    --   72   --   57   TRIG   --    --    --    --   132   --    --    --    --    --    --    --   123   --   129   ALT   --   45   --    --    --    --    --    --   19   --    --   21   --    < >  52*   CR  0.88  0.90  0.82   < >  0.80   < >   --    < >  0.58  0.81   < >  1.02   --    < >  0.82   GFRESTIMATED  62  61  68   < >  69   < >   --    < >  >90  Non  GFR Calc    68   < >  53*   --    < >  68   GFRESTBLACK  75  74  82   < >  84   < >   --    < >  >90   GFR Calc    83   < >  64   --    < >  83   POTASSIUM  3.8  3.5  3.7   < >  3.0*   < >   --    < >  3.3*  3.5   < >  3.6   --    < >  4.1    < > = values in this interval not displayed.      BP Readings from Last 3 Encounters:   09/12/17 90/46   08/30/17 102/71   08/01/17 128/78    Wt Readings from Last 3 Encounters:   09/12/17 206 lb (93.4 kg)   08/30/17 203 lb (92.1 kg)   08/01/17 205 lb (93 kg)                  Labs reviewed in EPIC        Reviewed and updated as needed this visit by clinical staff     Reviewed and updated as needed this visit by Provider     ROS:  Constitutional, HEENT, cardiovascular, pulmonary, gi and gu systems are negative, except as otherwise noted.      OBJECTIVE:   BP 90/46 (BP Location: Left arm, Patient Position: Chair, Cuff Size: Adult Large)  Pulse 72  Temp 98.3  F (36.8  C) (Oral)  Ht 5' 6\" (1.676 m)  Wt 206 lb (93.4 kg)  SpO2 95%  BMI 33.25 kg/m2  Body mass index is 33.25 kg/(m^2).  GENERAL APPEARANCE: healthy, alert " and no distress     EYES: PERRL, sclera clear     HENT: nose and mouth without ulcers or lesions     NECK: no adenopathy, no asymmetry, masses, or scars and thyroid normal to palpation     RESP: lungs clear to auscultation - no rales, rhonchi or wheezes     CV: regular rates and rhythm, normal S1 S2, no S3 or S4 and no murmur, click or rub      Abdomen: soft, mild abd tenderness in RLQ, no rebound or guarding, no HSM or masses and bowel sounds normal     Ext: warm, dry, no edema     Diagnostic Test Results:  Results for orders placed or performed in visit on 09/12/17   CBC with platelets and differential   Result Value Ref Range    WBC 11.6 (H) 4.0 - 11.0 10e9/L    RBC Count 4.67 3.8 - 5.2 10e12/L    Hemoglobin 13.1 11.7 - 15.7 g/dL    Hematocrit 41.4 35.0 - 47.0 %    MCV 89 78 - 100 fl    MCH 28.1 26.5 - 33.0 pg    MCHC 31.6 31.5 - 36.5 g/dL    RDW 19.0 (H) 10.0 - 15.0 %    Platelet Count 244 150 - 450 10e9/L    Diff Method Automated Method     % Neutrophils 76.6 %    % Lymphocytes 13.4 %    % Monocytes 8.5 %    % Eosinophils 1.0 %    % Basophils 0.5 %    Absolute Neutrophil 8.9 (H) 1.6 - 8.3 10e9/L    Absolute Lymphocytes 1.6 0.8 - 5.3 10e9/L    Absolute Monocytes 1.0 0.0 - 1.3 10e9/L    Absolute Eosinophils 0.1 0.0 - 0.7 10e9/L    Absolute Basophils 0.1 0.0 - 0.2 10e9/L     *Note: Due to a large number of results and/or encounters for the requested time period, some results have not been displayed. A complete set of results can be found in Results Review.       ASSESSMENT/PLAN:       ICD-10-CM    1. Diarrhea, unspecified type R19.7 Clostridium difficile Toxin B PCR   2. Abnormal flushing and sweating R23.2 CBC with platelets and differential     Diarrhea/RLQ abd pain (very localized, possibly more superficial)-  Has been on multiple abx courses (cipro and bactrim in 6/17 and 8/17)- sx's started a bit after the bactrim course.  Will check studies for c.dif and treat if positive.    If negative, need to follow, and  see if related to sweats that have been persistent for a few months.  Had w/u through cardiology for sweats on 8/28/17- bld cx x 2 and urine cx through cardiology- negative.  BMP negative.  WBC elevated at 13.7, with increased neutrophils and monocytes.  WBC up at 12.0 on 8/1/17.  Had otherwise been in normal range other than when it was 13.8 on 5/31/17.  CXR with no infection/malignancy signs from yesterday (ordered in f/u by cardiology).  If c. dif neg, f/u on sx's, and consider CT studies.    For now, keep the 10/6/17 appt- may need to adjust timing depending on results/sx's.  Will check for c.dif- If positive, will treat.  If neg, montior sx's, consider scan.      Ilene Tristan MD  [facility], [place]

## 2017-09-21 LAB
DLCOCOR-%PRED-PRE: 90 %
DLCOCOR-PRE: 18.46 ML/MIN/MMHG
DLCOUNC-%PRED-PRE: 77 %
DLCOUNC-PRE: 15.7 ML/MIN/MMHG
DLCOUNC-PRED: 20.34 ML/MIN/MMHG
ERV-%PRED-PRE: 42 %
ERV-PRE: 0.16 L
ERV-PRED: 0.37 L
EXPTIME-PRE: 8.35 SEC
FEF2575-%PRED-PRE: 61 %
FEF2575-PRE: 1.04 L/SEC
FEF2575-PRED: 1.69 L/SEC
FEFMAX-%PRED-PRE: 107 %
FEFMAX-PRE: 5.67 L/SEC
FEFMAX-PRED: 5.29 L/SEC
FEV1-%PRED-PRE: 63 %
FEV1-PRE: 1.35 L
FEV1FEV6-PRE: 77 %
FEV1FEV6-PRED: 78 %
FEV1FVC-PRE: 77 %
FEV1FVC-PRED: 77 %
FEV1SVC-PRE: 73 %
FEV1SVC-PRED: 68 %
FIFMAX-PRE: 3.36 L/SEC
FIO2-PRE: 21 %
FVC-%PRED-PRE: 63 %
FVC-PRE: 1.76 L
FVC-PRED: 2.77 L
IC-%PRED-PRE: 61 %
IC-PRE: 1.69 L
IC-PRED: 2.73 L
VA-%PRED-PRE: 61 %
VA-PRE: 3.29 L
VC-%PRED-PRE: 59 %
VC-PRE: 1.85 L
VC-PRED: 3.1 L

## 2017-09-24 ASSESSMENT — ENCOUNTER SYMPTOMS
HEMOPTYSIS: 0
NECK MASS: 1
SPUTUM PRODUCTION: 1
WHEEZING: 0
NIGHT SWEATS: 1
EYE IRRITATION: 0
CHILLS: 0
HALLUCINATIONS: 0
DIFFICULTY URINATING: 0
DOUBLE VISION: 0
FATIGUE: 1
SHORTNESS OF BREATH: 1
CONSTIPATION: 0
SINUS CONGESTION: 1
FEVER: 0
RECTAL BLEEDING: 0
HEMATURIA: 0
SNORES LOUDLY: 0
EYE PAIN: 0
POLYPHAGIA: 0
BLOOD IN STOOL: 0
DYSURIA: 0
VOMITING: 0
POSTURAL DYSPNEA: 1
JAUNDICE: 0
FLANK PAIN: 0
COUGH DISTURBING SLEEP: 1
EYE REDNESS: 0
WEIGHT LOSS: 0
BLOATING: 0
BOWEL INCONTINENCE: 0
HEARTBURN: 1
NAUSEA: 0
SORE THROAT: 1
COUGH: 1
RECTAL PAIN: 0
TASTE DISTURBANCE: 0
SMELL DISTURBANCE: 0
DYSPNEA ON EXERTION: 1
TROUBLE SWALLOWING: 1
EYE WATERING: 0
POLYDIPSIA: 1
DECREASED APPETITE: 0
HOARSE VOICE: 1
INCREASED ENERGY: 1
SINUS PAIN: 1
ABDOMINAL PAIN: 0
DIARRHEA: 1
WEIGHT GAIN: 1
ALTERED TEMPERATURE REGULATION: 0
RESPIRATORY PAIN: 0

## 2017-10-04 ENCOUNTER — OFFICE VISIT (OUTPATIENT)
Dept: PULMONOLOGY | Facility: CLINIC | Age: 77
End: 2017-10-04
Attending: INTERNAL MEDICINE
Payer: MEDICARE

## 2017-10-04 VITALS
TEMPERATURE: 98.1 F | BODY MASS INDEX: 32.95 KG/M2 | OXYGEN SATURATION: 96 % | RESPIRATION RATE: 18 BRPM | WEIGHT: 205 LBS | HEART RATE: 85 BPM | SYSTOLIC BLOOD PRESSURE: 125 MMHG | DIASTOLIC BLOOD PRESSURE: 82 MMHG | HEIGHT: 66 IN

## 2017-10-04 DIAGNOSIS — J84.9 ILD (INTERSTITIAL LUNG DISEASE) (H): ICD-10-CM

## 2017-10-04 DIAGNOSIS — J01.01 ACUTE RECURRENT MAXILLARY SINUSITIS: ICD-10-CM

## 2017-10-04 DIAGNOSIS — J84.9 ILD (INTERSTITIAL LUNG DISEASE) (H): Primary | ICD-10-CM

## 2017-10-04 DIAGNOSIS — R06.00 DYSPNEA, UNSPECIFIED TYPE: ICD-10-CM

## 2017-10-04 LAB
6 MIN WALK (FT): 935 FT
6 MIN WALK (M): 285 M

## 2017-10-04 PROCEDURE — 99212 OFFICE O/P EST SF 10 MIN: CPT | Mod: ZF

## 2017-10-04 RX ORDER — AZITHROMYCIN 250 MG/1
TABLET, FILM COATED ORAL
Qty: 6 TABLET | Refills: 0 | Status: SHIPPED | OUTPATIENT
Start: 2017-10-04 | End: 2018-01-31

## 2017-10-04 ASSESSMENT — PAIN SCALES - GENERAL: PAINLEVEL: MILD PAIN (2)

## 2017-10-04 NOTE — MR AVS SNAPSHOT
After Visit Summary   10/4/2017    Betty Tee    MRN: [id number]           Patient Information     Date Of Birth          1940        Visit Information        Provider Department      10/4/2017 11:00 AM Meño Walsh MD [facility]        Today's Diagnoses     ILD (interstitial lung disease) (H)    -  1    Acute recurrent maxillary sinusitis        Dyspnea, unspecified type          Care Instructions    Patient is instructed to call if there is any changes in symptoms.          Follow-ups after your visit        Follow-up notes from your care team     Return in about 6 weeks (around 11/15/2017).      Your next 10 appointments already scheduled     Oct 04, 2017 12:30 PM CDT   (Arrive by 12:15 PM)   XR CHEST 2 VIEWS with [facility]   [facility] X[facility] ([facility])    [address] [address] [phone]           Please bring a list of your current medicines to your exam. (Include vitamins, minerals and over-thecounter medicines.) Leave your valuables at home.  Tell your doctor if there is a chance you may be pregnant.  You do not need to do anything special for this exam.            Oct 06, 2017  1:00 PM CDT   Office Visit with Ilene Tristan MD   [facility] ([address] [address] [phone]           Bring a current list of meds and any records pertaining to this visit. For Physicals, please bring immunization records and any forms needing to be filled out. Please arrive 10 minutes early to complete paperwork.            Oct 17, 2017 10:30 AM CDT   Ech Complete with UCECHCR1   [facility] ([facility] [[email or web address] [place])    [address] [facility]  [address] [facility] [address] [phone]           1. Please bring or wear a comfortable two-piece outfit. 2. You may eat, drink and take your normal  medicines. 3. For any questions that cannot be answered, please contact the ordering physician            Oct 26, 2017  2:30 PM CDT   Lab with  LAB   [facility] Lab ([facility])    [address] [place] [address]  1st [facility] [id number] [phone]            Oct 26, 2017  3:00 PM CDT   (Arrive by 2:45 PM)   RETURN HEART FAILURE with Radha Rosa MD   [facility] Heart Care ([facility])    [address] [place] [id number]   [phone]            Nov 15, 2017  3:30 PM CST   FULL PULMONARY FUNCTION with  PFL D   [facility] Pulmonary Function Testing ([facility])    [address] [place] [id number] [phone]            Nov 15, 2017  4:30 PM CST   (Arrive by 4:15 PM)   Return Interstitial Lung with Meño Walsh MD   [facility] ([facility])    [address] [place] [id number] [phone]              Future tests that were ordered for you today     Open Future Orders        [facility] Expected Expires Ordered    6 minute walk test Routine  10/4/2018 10/4/2017    Echocardiogram Complete Routine  10/4/2018 10/4/2017            Who to contact     If you have questions or need follow up information about today's clinic visit or your schedule please contact [facility] directly at [phone].  Normal or non-critical lab and imaging results will be communicated to you by MyChart, letter or phone within 4 business days after the clinic has received the results. If you do not hear from us within 7 days, please contact the clinic through [employer] or phone. If you have a critical or abnormal lab result, we will notify you by phone as soon as possible.  Submit refill requests through [employer] or call your pharmacy and they will forward the refill request to us. Please allow  "3 business days for your refill to be completed.          Additional Information About Your Visit        MyChart Information     [employer] gives you secure access to your electronic health record. If you see a primary care provider, you can also send messages to your care team and make appointments. If you have questions, please call your primary care clinic.  If you do not have a primary care provider, please call [phone] and they will assist you.        Care EveryWhere ID     This is your Care EveryWhere ID. This could be used by other organizations to access your [place] medical records  [id number]        Your Vitals Were     Pulse Temperature Respirations Height Pulse Oximetry BMI (Body Mass Index)    85 98.1  F (36.7  C) (Oral) 18 1.676 m (5' 6\") 96% 33.09 kg/m2       Blood Pressure from Last 3 Encounters:   10/04/17 125/82   09/12/17 90/46   08/30/17 102/71    Weight from Last 3 Encounters:   10/04/17 93 kg (205 lb)   09/12/17 93.4 kg (206 lb)   08/30/17 92.1 kg (203 lb)              We Performed the Following     XR Chest 2 Views          Today's Medication Changes          These changes are accurate as of: 10/4/17 12:09 PM.  If you have any questions, ask your nurse or doctor.               Start taking these medicines.        Dose/Directions    azithromycin 250 MG tablet   Commonly known as:  ZITHROMAX   Used for:  Acute recurrent maxillary sinusitis   Started by:  Meño Walsh MD        Two tablets first day, then one tablet daily for four days.   Quantity:  6 tablet   Refills:  0         These medicines have changed or have updated prescriptions.        Dose/Directions    acyclovir 5 % ointment   Commonly known as:  ZOVIRAX   This may have changed:  additional instructions   Used for:  Recurrent cold sores        Apply topically 6 times daily   Quantity:  15 g   Refills:  3       fluticasone 50 MCG/ACT spray   Commonly known as:  FLONASE   This may have changed:    - when to take this  - reasons to " take this   Used for:  Seasonal allergic rhinitis        Dose:  1-2 spray   Spray 1-2 sprays into both nostrils daily   Quantity:  16 g   Refills:  5            Where to get your medicines      These medications were sent to [facility]/pharmacy #[id number] - [place], [place] - [address] [facility]  [address] [place] [address] [place] [address]     Phone:  [phone]     azithromycin 250 MG tablet                Primary Care Provider Office Phone # Fax #    Ilene Tristan [phone] [address] [facility] [address] [address]        Equal Access to Services     [facility]: Hadii aad ku hadasho Soomaali, waaxda luqadaha, qaybta kaalmada adeegyada, anderson smith haysanket mercedes . So [facility] [phone].    ATENCIÓN: Si habla español, tiene a conti disposición servicios gratuitos de asistencia lingüística. [facility] [phone].    We comply with applicable federal civil rights laws and [place] laws. We do not discriminate on the basis of race, color, national origin, age, disability, sex, sexual orientation, or gender identity.            Thank you!     Thank you for choosing [facility]  for your care. Our goal is always to provide you with excellent care. Hearing back from our patients is one way we can continue to improve our services. Please take a few minutes to complete the written survey that you may receive in the mail after your visit with us. Thank you!             Your Updated Medication List - Protect others around you: Learn how to safely use, store and throw away your medicines at www.disposemymeds.org.          This list is accurate as of: 10/4/17 12:09 PM.  Always use your most recent med list.                   Brand Name Dispense Instructions for use Diagnosis    acyclovir 400 MG tablet    ZOVIRAX     Take 400 mg by mouth See Admin Instructions 5 times daily as needed for outbreaks        acyclovir 5 % ointment    ZOVIRAX    15  g    Apply topically 6 times daily    Recurrent cold sores       albuterol 108 (90 BASE) MCG/ACT Inhaler    PROAIR HFA/PROVENTIL HFA/VENTOLIN HFA    1 Inhaler    Inhale 2 puffs into the lungs every 6 hours    ILD (interstitial lung disease) (H)       atorvastatin 20 MG tablet    LIPITOR    90 tablet    Take 1 tablet (20 mg) by mouth daily    Type 2 diabetes mellitus with other specified complication (H)       azithromycin 250 MG tablet    ZITHROMAX    6 tablet    Two tablets first day, then one tablet daily for four days.    Acute recurrent maxillary sinusitis       * blood glucose monitoring lancets     4 Box    Use to test blood sugar 4 times daily or as directed.    Type 2 diabetes mellitus without complication, without long-term current use of insulin (H)       * blood glucose monitoring lancets     1 Box    Use to test blood sugar 4 times daily or as directed.  Ok to substitute alternative if insurance prefers.    Type 2 diabetes mellitus without complication, without long-term current use of insulin (H)       * blood glucose monitoring test strip    no brand specified    300 strip    Use to test blood sugars 4 times daily or as directed    Type 2 diabetes mellitus without complication, without long-term current use of insulin (H)       * blood glucose monitoring test strip    ACCU-CHEK SHANNON PLUS    120 strip    Use to test blood sugar 4 times daily or as directed.  Ok to substitute alternative if insurance prefers.    Type 2 diabetes mellitus without complication, without long-term current use of insulin (H)       COMPRESSION STOCKINGS     2 each    Wear compression stockings in affected leg (right leg) or both legs most of the time during the day and take them off at night.    DVT (deep venous thrombosis), right, Postphlebitic syndrome, Chronic anticoagulation, Atrial fibrillation (H)       ferrous sulfate 325 (65 FE) MG tablet    IRON    30 tablet    Take 1 tablet (325 mg) by mouth daily (with breakfast)     Iron deficiency anemia due to chronic blood loss       fluticasone 220 MCG/ACT Inhaler    FLOVENT HFA    3 Inhaler    Inhale 2 puffs into the lungs 2 times daily    ILD (interstitial lung disease) (H), Follicular bronchiolitis (H)       fluticasone 50 MCG/ACT spray    FLONASE    16 g    Spray 1-2 sprays into both nostrils daily    Seasonal allergic rhinitis       furosemide 40 MG tablet    LASIX    180 tablet    Take 1 tablet (40 mg) by mouth 2 times daily    (HFpEF) heart failure with preserved ejection fraction (H)       * insulin pen needle 31G X 8 MM    B-D U/F    400 each    Use 4 times daily (with Lantus and Novolog) or as directed.    Type 2 diabetes mellitus without complication, without long-term current use of insulin (H)       * insulin pen needle 32G X 4 MM    BD JANE U/F    200 each    Use 4 daily as directed.    Type 2 diabetes mellitus without complication, without long-term current use of insulin (H)       ketoconazole 2 % cream    NIZORAL    60 g    Apply topically 2 times daily    Cutaneous candidiasis       lactobacillus rhamnosus (GG) capsule     90 capsule    Take 1 capsule by mouth 3 times daily (before meals)    Pneumonia due to infectious organism, unspecified laterality, unspecified part of lung       LANTUS SOLOSTAR 100 UNIT/ML injection   Generic drug:  insulin glargine     15 mL    INJECT 25 UNTIS SUBCUTANEOUSLY EVERY DAY    Type 2 diabetes mellitus with hyperglycemia, with long-term current use of insulin (H)       lisinopril 2.5 MG tablet    PRINIVIL/Zestril    90 tablet    Take 1 tablet (2.5 mg) by mouth daily    Essential hypertension with goal blood pressure less than 140/90       metFORMIN 500 MG 24 hr tablet    GLUCOPHAGE-XR    180 tablet    TAKE 2 TABLETS (1,000 MG) BY MOUTH DAILY (WITH DINNER)    Type 2 diabetes mellitus without complication, without long-term current use of insulin (H)       metoprolol 100 MG 24 hr tablet    TOPROL-XL    90 tablet    Take 1 tablet (100 mg) by  mouth daily    Atrial fibrillation with controlled ventricular response (H)       montelukast 10 MG tablet    SINGULAIR    90 tablet    Take 1 tablet (10 mg) by mouth At Bedtime    Sjogren's syndrome with lung involvement (H), ILD (interstitial lung disease) (H), Chronic seasonal allergic rhinitis due to other allergen       NovoLOG FLEXPEN 100 UNIT/ML injection   Generic drug:  insulin aspart     15 mL    INJECT 12 UNITS SUBCUTANEOUS 3 TIMES DAILY (WITH MEALS)    Type 2 diabetes mellitus with other specified complication (H)       order for DME     1 Device    Oxygen: Patient requires supplemental Oxygen 4 LPM via nasal canula with activity. Please provide patient with a home unit and with portability capability. Oxygen will be for a lifetime.    Hypoxia, ILD (interstitial lung disease) (H)       pantoprazole 20 MG EC tablet    PROTONIX    60 tablet    Take 1-2 tablets (20-40 mg) by mouth daily    Gastroesophageal reflux disease without esophagitis       PARoxetine 10 MG tablet    PAXIL    30 tablet    TAKE 1 TABLET (10MG) BY MOUTH AT BEDTIME    Major depressive disorder, recurrent episode, moderate (H)       polyethylene glycol Packet    MIRALAX/GLYCOLAX    7 packet    Take 17 g by mouth daily as needed for constipation    Constipation, unspecified constipation type       potassium chloride SA 20 MEQ CR tablet    K-DUR/KLOR-CON M    180 tablet    Take 2 tablets (40 mEq) by mouth daily    Acute on chronic heart failure, unspecified heart failure type (H), Acute and chronic respiratory failure with hypoxia (H), (HFpEF) heart failure with preserved ejection fraction (H)       predniSONE 10 MG tablet    DELTASONE    90 tablet    Take 2 tablets for three days (4/12-14), then take 1 tablet daily (10 mg daily starting 4/15)    ILD (interstitial lung disease) (H), Sjogren's syndrome (H)       spironolactone 25 MG tablet    ALDACTONE    180 tablet    Take 2 tablets (50 mg) by mouth daily    Chronic diastolic congestive  heart failure (H)       traZODone 50 MG tablet    DESYREL    90 tablet    TAKE 1/2-1 TABLET BY MOUTH NIGHTLY AS NEEDED, CAN TITRATE DOWN TO 1/2TAB OR UP TO 2TABS AS NEEDED    Insomnia due to medical condition       TYLENOL 500 MG tablet   Generic drug:  acetaminophen      Take 500 mg by mouth nightly as needed    Dizziness       warfarin 7.5 MG tablet    COUMADIN    100 tablet    Current dose is 7.5 mg daily.  Dose adjusted per INR result.    Atrial fibrillation with controlled ventricular response (H)       * Notice:  This list has 6 medication(s) that are the same as other medications prescribed for you. Read the directions carefully, and ask your doctor or other care provider to review them with you.

## 2017-10-04 NOTE — PROGRESS NOTES
[facility]   Pulmonary Clinic Follow Up Note  October 4, 2017      Betty Tee MRN# [id number]   Age: 77 year old YOB: 1940     Date of Consultation: October 4, 2017    Primary care provider: Ilene Tristan     History taken from; Patient      Betty Tee is a 77 year old female seen in the Pulmonary Clinic  for f/u.  Chief Complaint   Patient presents with     Interstitial Lung Disease (ILD)     Follow up on Betty and her ILD   .          Pulmonary Problem List / Reason for follow up:   1. ILD  2. GERARD           Assessment and Plan:   ASSESSMENT AND PLAN:  The patient is a 77-year-old lady with a history of interstitial lung disease and bronchiolitis obliterans, coming for the followup visit.      1.  Interstitial lung disease.  We will continue to observe the patient and will follow the patient with pulmonary function tests.     2.  Bronchiectasis obliterans.  The patient will be on albuterol 3 times daily, we will get a spacer for the patient and because of the recent infection we will do the chest x-ray today.  The patient is more short of breath when she lies down is feeling better when she is on a few pillows.  We will check echocardiogram for the patient.  The patient will have a 6-minute walk test today and we will start the patient on 6 days on the Z-Pack.      We explained the plan to the patient including the risks and benefits.  The patient expressed understanding and agreed with the plan.      Thank you very much for the opportunity to participate in the care of this very pleasant patient.             Return visit in 6 weeks      Meño Walsh M.D.         History of Present Illness / Interval History:     CHIEF COMPLAINT:  Bronchiolitis obliterans and interstitial lung disease related to the connective tissue disease      HISTORY OF PRESENT ILLNESS:  The patient is a 77-year-old lady with history of interstitial lung disease  and bronchiolitis obliterans coming for the followup visit.  The patient is not doing well.  Over the last 3 weeks the patient has increased cough and the patient has productive of yellow sputum.  The patient is feeling worse.  The patient is feeling more fatigued, the patient has difficulties breathing and the patient has more limitations with exercise.  The patient did not have pulmonary function test on this visit today.                      Pulmonary Data:       Exposure history: Asbestos;  No , TB;  No , Radiation;   No          Medications:     Current Outpatient Prescriptions   Medication Sig     azithromycin (ZITHROMAX) 250 MG tablet Two tablets first day, then one tablet daily for four days.     metFORMIN (GLUCOPHAGE-XR) 500 MG 24 hr tablet TAKE 2 TABLETS (1,000 MG) BY MOUTH DAILY (WITH DINNER)     furosemide (LASIX) 40 MG tablet Take 1 tablet (40 mg) by mouth 2 times daily     NOVOLOG FLEXPEN 100 UNIT/ML soln INJECT 12 UNITS SUBCUTANEOUS 3 TIMES DAILY (WITH MEALS)     PARoxetine (PAXIL) 10 MG tablet TAKE 1 TABLET (10MG) BY MOUTH AT BEDTIME     atorvastatin (LIPITOR) 20 MG tablet Take 1 tablet (20 mg) by mouth daily     montelukast (SINGULAIR) 10 MG tablet Take 1 tablet (10 mg) by mouth At Bedtime     warfarin (COUMADIN) 7.5 MG tablet Current dose is 7.5 mg daily.  Dose adjusted per INR result.     potassium chloride SA (K-DUR/KLOR-CON M) 20 MEQ CR tablet Take 2 tablets (40 mEq) by mouth daily     spironolactone (ALDACTONE) 25 MG tablet Take 2 tablets (50 mg) by mouth daily     traZODone (DESYREL) 50 MG tablet TAKE 1/2-1 TABLET BY MOUTH NIGHTLY AS NEEDED, CAN TITRATE DOWN TO 1/2TAB OR UP TO 2TABS AS NEEDED     ferrous sulfate (IRON) 325 (65 FE) MG tablet Take 1 tablet (325 mg) by mouth daily (with breakfast)     LANTUS SOLOSTAR 100 UNIT/ML soln INJECT 25 UNTIS SUBCUTANEOUSLY EVERY DAY     lisinopril (PRINIVIL/ZESTRIL) 2.5 MG tablet Take 1 tablet (2.5 mg) by mouth daily     lactobacillus rhamnosus, GG,  (CULTURELL) capsule Take 1 capsule by mouth 3 times daily (before meals)     blood glucose monitoring (NO BRAND SPECIFIED) test strip Use to test blood sugars 4 times daily or as directed     blood glucose monitoring (ACCU-CHEK MULTICLIX) lancets Use to test blood sugar 4 times daily or as directed.     insulin pen needle (B-D U/F) 31G X 8 MM Use 4 times daily (with Lantus and Novolog) or as directed.     insulin pen needle (BD JANE U/F) 32G X 4 MM Use 4 daily as directed.     blood glucose monitoring (ACCU-CHEK SHANNON PLUS) test strip Use to test blood sugar 4 times daily or as directed.  Ok to substitute alternative if insurance prefers.     blood glucose monitoring (ACCU-CHEK FASTCLIX) lancets Use to test blood sugar 4 times daily or as directed.  Ok to substitute alternative if insurance prefers.     predniSONE (DELTASONE) 10 MG tablet Take 2 tablets for three days (4/12-14), then take 1 tablet daily (10 mg daily starting 4/15)     pantoprazole (PROTONIX) 20 MG EC tablet Take 1-2 tablets (20-40 mg) by mouth daily     ketoconazole (NIZORAL) 2 % cream Apply topically 2 times daily     metoprolol (TOPROL-XL) 100 MG 24 hr tablet Take 1 tablet (100 mg) by mouth daily     albuterol (PROAIR HFA, PROVENTIL HFA, VENTOLIN HFA) 108 (90 BASE) MCG/ACT inhaler Inhale 2 puffs into the lungs every 6 hours     order for DME Oxygen: Patient requires supplemental Oxygen 4 LPM via nasal canula with activity. Please provide patient with a home unit and with portability capability. Oxygen will be for a lifetime.     polyethylene glycol (MIRALAX/GLYCOLAX) packet Take 17 g by mouth daily as needed for constipation     acyclovir (ZOVIRAX) 5 % ointment Apply topically 6 times daily (Patient taking differently: Apply topically 6 times daily As needed for outbreaks)     fluticasone (FLOVENT HFA) 220 MCG/ACT inhaler Inhale 2 puffs into the lungs 2 times daily     acyclovir (ZOVIRAX) 400 MG tablet Take 400 mg by mouth See Admin Instructions 5  times daily as needed for outbreaks     fluticasone (FLONASE) 50 MCG/ACT nasal spray Spray 1-2 sprays into both nostrils daily (Patient taking differently: Spray 1-2 sprays into both nostrils daily as needed for allergies )     COMPRESSION STOCKINGS Wear compression stockings in affected leg (right leg) or both legs most of the time during the day and take them off at night.     acetaminophen (TYLENOL) 500 MG tablet Take 500 mg by mouth nightly as needed      No current facility-administered medications for this visit.              Past Medical History:     Past Medical History:   Diagnosis Date     Alcohol abuse, in remission      Allergic rhinitis, cause unspecified     allegra helps when she takes it     Antiplatelet or antithrombotic long-term use      Atrial fibrillation (H)     in hosp in 11/11 after surgery w/ fluid overload     Cardiomegaly     LVH on stress echo- cardiac w/u at [facility] ER- neg CT scan for PE, neg stress echo in 8/06     Chest pain, unspecified      Disorder of bone and cartilage, unspecified     osteopenia (had been on prempro), improved on 6/06 dexa, stable dexa 11/10     Diverticulosis of colon (without mention of hemorrhage)     last episode yrs ago     Essential hypertension, benign      Gastro-oesophageal reflux disease      Insomnia, unspecified     weaned off clonazepam     Irregular heart beat      Lumbago 7/09    MRI with DJD, now seeing Dr. Cain for sciatic sx's     Major depressive disorder, recurrent episode, moderate (H)      Obstructive sleep apnea      Osteoarthrosis, unspecified whether generalized or localized, unspecified site      Sjogren's syndrome (H)      Sleep apnea      Tobacco use disorder     chantix in 9/07, started again in 6/08, working             Past Surgical History:     Past Surgical History:   Procedure Laterality Date     BACK SURGERY  1962     BIOPSY BREAST  9/27/02    Biopsy Left Breast     C APPENDECTOMY  1970's?     C NONSPECIFIC PROCEDURE  11/05     exploratory abd lap, adhesions, resolved RLQ pain, diverticulitis episodes     CARDIAC SURGERY       CHOLECYSTECTOMY  1990's?     COLECTOMY LEFT  11/7/2011    Procedure:COLECTOMY LEFT; Laparoscopic mobilization of splenic flexture, sigmoid colectomy, coloprotoscopy, loop illeostomy; Surgeon:CK CASTANEDA; Location:[facility]     HYSTERECTOMY TOTAL ABDOMINAL, BILATERAL SALPINGO-OOPHORECTOMY, COMBINED  11/7/2011    Procedure:COMBINED HYSTERECTOMY TOTAL ABDOMINAL, BILATERAL SALPINGO-OOPHORECTOMY; total abdominal hysterectomy, bilateral salpingo-oophorectomy; Surgeon:ALETA MANUEL; Location:[facility] OR     INSERT STENT URETER  11/7/2011    Procedure:INSERT STENT URETER; Placement of Bilateral Ureteral Stents ; Surgeon:PRANEETH BRYANT; Location:[facility]     SIGMOIDOSCOPY FLEXIBLE  11/3/2011    Procedure:SIGMOIDOSCOPY FLEXIBLE; Flexible Sigmoidoscopy; Surgeon:CK CASTANEDA; Location:[facility]     TAKEDOWN ILEOSTOMY  2/1/2012    Procedure:TAKEDOWN ILEOSTOMY; Takedown Loop Ileostomy ; Surgeon:CK CASTANEDA; Location:[facility] OR             Social History:     Social History     Social History     Marital status: Single     Spouse name: N/A     Number of children: 0     Years of education: Ed Spec De     Occupational History     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     Social History Main Topics     Smoking status: Former Smoker     Packs/day: 0.50     Years: 10.00     Types: Cigarettes     Quit date: 8/1/2011     Smokeless tobacco: Never Used      Comment: 1/2 ppd     Alcohol use No      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     Drug use: No     Sexual activity: No     Other Topics Concern     Not on file     Social History Narrative    Social Documentation:        Balanced Diet: YES    Calcium intake: 1-2 per day    Caffeine: 4-5 cups per day    Exercise:  type of activity limited right now due to foot injury    Sunscreen: No    Seatbelts:  Yes    Self Breast Exam:  Yes    Self  Testicular Exam: n/a    Physical/Emotional/Sexual Abuse: No    Do you feel safe in your environment? No-pt lives in [facility]        Cholesterol screen up to date: No-will check today    Eye Exam up to date: Yes    Dental Exam up to date: Yes    Pap smear up to date: Does Not Apply    Mammogram up to date: Yes-10/07    Dexa Scan up to date: Yes-2006    Colonoscopy up to date: Yes-2006    Immunizations up to date: Yes-td 2002    Glucose screen if over 40:  Yes    '09                             Family History:     Family History   Problem Relation Age of Onset     C.A.D. Mother 63     MI- first at age 63     HEART DISEASE Mother      Hypertension Mother      CEREBROVASCULAR DISEASE Mother      Hyperlipidemia Mother      Alcohol/Drug Father      Alzheimer Disease Father      Dementia Father      Hypertension Father      Hyperlipidemia Father      C.A.D. Sister 52     Minor MI- age 50's     HEART DISEASE Sister      Hypertension Sister      Hypertension Sister      Hypertension Brother      Cancer - colorectal Sister 48     Late 40's early 50's     Prostate Cancer Brother 74     Dx'd age 74     GASTROINTESTINAL DISEASE Sister      Diverticulitis     GASTROINTESTINAL DISEASE Brother      Diverticulitis     Lipids Sister      Lipids Sister      Parkinsonism Brother      DIABETES Sister      HEART DISEASE Sister      CHF     CANCER Sister      lung, smoker     Substance Abuse Sister      Substance Abuse Brother      Asthma Sister      CANCER Sister      Breast Cancer Daughter      Prostate Cancer Brother      Hyperlipidemia Brother              Immunization:     Immunization History   Administered Date(s) Administered     Influenza (High Dose) 3 valent vaccine 10/11/2013, 10/14/2014, 12/31/2015, 11/11/2016     Influenza (IIV3) 11/24/2003, 10/08/2004, 10/28/2005, 11/01/2006, 09/01/2010, 10/17/2011, 09/21/2012     Pneumococcal (PCV 13) 10/30/2015     Pneumococcal 23 valent 11/03/2010     TD (ADULT, 7+) 01/15/1993,  11/05/2002     TDAP Vaccine (Adacel) 11/03/2010     Zoster vaccine, live 10/05/2009              Review of Systems:   I have done 10 points of review systems and pertinent findings are as above, otherwise negative.             Physical Examination:   General: Alert, oriented, not in distress  B/P: 125/82, T: 98.1, P: 85, R: 18  HEENT: neck supple, symmetrical, no lymph node enlargement, thyromegaly, bruit, JVD, pupils are isocoric and equally responsive to the light.   Lungs: both hemithoraces are symmetrical, normal to palpation, no dullness to percussion, auscultation of lungs revealed good air entry  CVS: Normal S1 and S2, no additional heart sounds, murmur, rub, normal peripheral pulses  Abdomen: Bowel sounds present, soft, non tender, non distended, no organomegaly, ascitis, mass  Extremities/musculoskeletal: no peripheral edema, deformity, cyanosis, clubbing  Neurology: alert, orientedx3, no motor/sensorial deficits, cranial nerves grossly normal  Skin: no rash  Psychiatry: Mood and affect are appropriate             DATA:     CBC RESULTS:     Recent Labs   Lab Test  09/12/17   1717  08/30/17   1139   WBC  11.6*  13.7*   RBC  4.67  4.84   HGB  13.1  13.2   HCT  41.4  41.7   PLT  244  263       Basic Metabolic Panel:  Recent Labs   Lab Test  08/30/17   1045  08/01/17   1146   NA  137  139   POTASSIUM  3.8  3.5   CHLORIDE  101  104   CO2  24  31   BUN  22  21   CR  0.88  0.90   GLC  159*  98   CLAUDY  8.8  9.0       INR  Recent Labs   Lab Test  08/30/17   1139 08/01/17   INR  2.09*  2.0*        PFT   PFT Latest Ref Rng & Units 6/21/2017   FVC L 1.76   FEV1 L 1.35   FVC% % 63   FEV1% % 63               Thank you for allowing me participate in the care of Betty Tee.      Meño Walsh M.D.  Associate Professor of Medicine  Pulmonary, Allergy, Critical Care and Sleep

## 2017-10-04 NOTE — NURSING NOTE
Chief Complaint   Patient presents with     Interstitial Lung Disease (ILD)     Follow up on Betty and her ILD     Wayne Nuñez CMA at 10:50 AM on 10/4/2017

## 2017-10-04 NOTE — LETTER
10/4/2017        RE: Betty Tee  [address]  [facility] [address]     Dear Colleague,    Thank you for referring your patient, Betty Tee, to the [facility] at [facility]. Please see a copy of my visit note below.          [facility]   Pulmonary Clinic Follow Up Note  October 4, 2017      Betty Tee MRN# [id number]   Age: 77 year old YOB: 1940     Date of Consultation: October 4, 2017    Primary care provider: Ilene Tristan     History taken from; Patient      Betty Tee is a 77 year old female seen in the Pulmonary Clinic  for f/u.  Chief Complaint   Patient presents with     Interstitial Lung Disease (ILD)     Follow up on Betty and her ILD   .          Pulmonary Problem List / Reason for follow up:   1. ILD  2. GERARD           Assessment and Plan:   ASSESSMENT AND PLAN:  The patient is a 77-year-old lady with a history of interstitial lung disease and bronchiolitis obliterans, coming for the followup visit.      1.  Interstitial lung disease.  We will continue to observe the patient and will follow the patient with pulmonary function tests.     2.  Bronchiectasis obliterans.  The patient will be on albuterol 3 times daily, we will get a spacer for the patient and because of the recent infection we will do the chest x-ray today.  The patient is more short of breath when she lies down is feeling better when she is on a few pillows.  We will check echocardiogram for the patient.  The patient will have a 6-minute walk test today and we will start the patient on 6 days on the Z-Pack.      We explained the plan to the patient including the risks and benefits.  The patient expressed understanding and agreed with the plan.      Thank you very much for the opportunity to participate in the care of this very pleasant patient.             Return visit in 6  morteza Walsh M.D.         History of Present Illness / Interval History:     CHIEF COMPLAINT:  Bronchiolitis obliterans and interstitial lung disease related to the connective tissue disease      HISTORY OF PRESENT ILLNESS:  The patient is a 77-year-old lady with history of interstitial lung disease and bronchiolitis obliterans coming for the followup visit.  The patient is not doing well.  Over the last 3 weeks the patient has increased cough and the patient has productive of yellow sputum.  The patient is feeling worse.  The patient is feeling more fatigued, the patient has difficulties breathing and the patient has more limitations with exercise.  The patient did not have pulmonary function test on this visit today.                      Pulmonary Data:       Exposure history: Asbestos;  No , TB;  No , Radiation;   No          Medications:     Current Outpatient Prescriptions   Medication Sig     azithromycin (ZITHROMAX) 250 MG tablet Two tablets first day, then one tablet daily for four days.     metFORMIN (GLUCOPHAGE-XR) 500 MG 24 hr tablet TAKE 2 TABLETS (1,000 MG) BY MOUTH DAILY (WITH DINNER)     furosemide (LASIX) 40 MG tablet Take 1 tablet (40 mg) by mouth 2 times daily     NOVOLOG FLEXPEN 100 UNIT/ML soln INJECT 12 UNITS SUBCUTANEOUS 3 TIMES DAILY (WITH MEALS)     PARoxetine (PAXIL) 10 MG tablet TAKE 1 TABLET (10MG) BY MOUTH AT BEDTIME     atorvastatin (LIPITOR) 20 MG tablet Take 1 tablet (20 mg) by mouth daily     montelukast (SINGULAIR) 10 MG tablet Take 1 tablet (10 mg) by mouth At Bedtime     warfarin (COUMADIN) 7.5 MG tablet Current dose is 7.5 mg daily.  Dose adjusted per INR result.     potassium chloride SA (K-DUR/KLOR-CON M) 20 MEQ CR tablet Take 2 tablets (40 mEq) by mouth daily     spironolactone (ALDACTONE) 25 MG tablet Take 2 tablets (50 mg) by mouth daily     traZODone (DESYREL) 50 MG tablet TAKE 1/2-1 TABLET BY MOUTH NIGHTLY AS NEEDED, CAN TITRATE DOWN TO 1/2TAB OR UP TO 2TABS AS  NEEDED     ferrous sulfate (IRON) 325 (65 FE) MG tablet Take 1 tablet (325 mg) by mouth daily (with breakfast)     LANTUS SOLOSTAR 100 UNIT/ML soln INJECT 25 UNTIS SUBCUTANEOUSLY EVERY DAY     lisinopril (PRINIVIL/ZESTRIL) 2.5 MG tablet Take 1 tablet (2.5 mg) by mouth daily     lactobacillus rhamnosus, GG, (CULTURELL) capsule Take 1 capsule by mouth 3 times daily (before meals)     blood glucose monitoring (NO BRAND SPECIFIED) test strip Use to test blood sugars 4 times daily or as directed     blood glucose monitoring (ACCU-CHEK MULTICLIX) lancets Use to test blood sugar 4 times daily or as directed.     insulin pen needle (B-D U/F) 31G X 8 MM Use 4 times daily (with Lantus and Novolog) or as directed.     insulin pen needle (BD JANE U/F) 32G X 4 MM Use 4 daily as directed.     blood glucose monitoring (ACCU-CHEK SHANNON PLUS) test strip Use to test blood sugar 4 times daily or as directed.  Ok to substitute alternative if insurance prefers.     blood glucose monitoring (ACCU-CHEK FASTCLIX) lancets Use to test blood sugar 4 times daily or as directed.  Ok to substitute alternative if insurance prefers.     predniSONE (DELTASONE) 10 MG tablet Take 2 tablets for three days (4/12-14), then take 1 tablet daily (10 mg daily starting 4/15)     pantoprazole (PROTONIX) 20 MG EC tablet Take 1-2 tablets (20-40 mg) by mouth daily     ketoconazole (NIZORAL) 2 % cream Apply topically 2 times daily     metoprolol (TOPROL-XL) 100 MG 24 hr tablet Take 1 tablet (100 mg) by mouth daily     albuterol (PROAIR HFA, PROVENTIL HFA, VENTOLIN HFA) 108 (90 BASE) MCG/ACT inhaler Inhale 2 puffs into the lungs every 6 hours     order for DME Oxygen: Patient requires supplemental Oxygen 4 LPM via nasal canula with activity. Please provide patient with a home unit and with portability capability. Oxygen will be for a lifetime.     polyethylene glycol (MIRALAX/GLYCOLAX) packet Take 17 g by mouth daily as needed for constipation     acyclovir  (ZOVIRAX) 5 % ointment Apply topically 6 times daily (Patient taking differently: Apply topically 6 times daily As needed for outbreaks)     fluticasone (FLOVENT HFA) 220 MCG/ACT inhaler Inhale 2 puffs into the lungs 2 times daily     acyclovir (ZOVIRAX) 400 MG tablet Take 400 mg by mouth See Admin Instructions 5 times daily as needed for outbreaks     fluticasone (FLONASE) 50 MCG/ACT nasal spray Spray 1-2 sprays into both nostrils daily (Patient taking differently: Spray 1-2 sprays into both nostrils daily as needed for allergies )     COMPRESSION STOCKINGS Wear compression stockings in affected leg (right leg) or both legs most of the time during the day and take them off at night.     acetaminophen (TYLENOL) 500 MG tablet Take 500 mg by mouth nightly as needed      No current facility-administered medications for this visit.              Past Medical History:     Past Medical History:   Diagnosis Date     Alcohol abuse, in remission      Allergic rhinitis, cause unspecified     allegra helps when she takes it     Antiplatelet or antithrombotic long-term use      Atrial fibrillation (H)     in hosp in 11/11 after surgery w/ fluid overload     Cardiomegaly     LVH on stress echo- cardiac w/u at [facility] ER- neg CT scan for PE, neg stress echo in 8/06     Chest pain, unspecified      Disorder of bone and cartilage, unspecified     osteopenia (had been on prempro), improved on 6/06 dexa, stable dexa 11/10     Diverticulosis of colon (without mention of hemorrhage)     last episode yrs ago     Essential hypertension, benign      Gastro-oesophageal reflux disease      Insomnia, unspecified     weaned off clonazepam     Irregular heart beat      Lumbago 7/09    MRI with NEAL, now seeing Dr. Cain for sciatic sx's     Major depressive disorder, recurrent episode, moderate (H)      Obstructive sleep apnea      Osteoarthrosis, unspecified whether generalized or localized, unspecified site      Sjogren's syndrome (H)      Sleep  apnea      Tobacco use disorder     chantix in 9/07, started again in 6/08, working             Past Surgical History:     Past Surgical History:   Procedure Laterality Date     BACK SURGERY  1962     BIOPSY BREAST  9/27/02    Biopsy Left Breast     C APPENDECTOMY  1970's?     C NONSPECIFIC PROCEDURE  11/05    exploratory abd lap, adhesions, resolved RLQ pain, diverticulitis episodes     CARDIAC SURGERY       CHOLECYSTECTOMY  1990's?     COLECTOMY LEFT  11/7/2011    Procedure:COLECTOMY LEFT; Laparoscopic mobilization of splenic flexture, sigmoid colectomy, coloprotoscopy, loop illeostomy; Surgeon:CK CASTANEDA; Location:[facility] OR     HYSTERECTOMY TOTAL ABDOMINAL, BILATERAL SALPINGO-OOPHORECTOMY, COMBINED  11/7/2011    Procedure:COMBINED HYSTERECTOMY TOTAL ABDOMINAL, BILATERAL SALPINGO-OOPHORECTOMY; total abdominal hysterectomy, bilateral salpingo-oophorectomy; Surgeon:ALETA MANUEL; Location:[facility] OR     INSERT STENT URETER  11/7/2011    Procedure:INSERT STENT URETER; Placement of Bilateral Ureteral Stents ; Surgeon:PRANEETH BRYANT; Location:[facility] OR     SIGMOIDOSCOPY FLEXIBLE  11/3/2011    Procedure:SIGMOIDOSCOPY FLEXIBLE; Flexible Sigmoidoscopy; Surgeon:CK CASTANEDA; Location:[facility] OR     TAKEDOWN ILEOSTOMY  2/1/2012    Procedure:TAKEDOWN ILEOSTOMY; Takedown Loop Ileostomy ; Surgeon:CK CASTANEDA; Location:[facility] OR             Social History:     Social History     Social History     Marital status: Single     Spouse name: N/A     Number of children: 0     Years of education: Ed Spec De     Occupational History     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     Social History Main Topics     Smoking status: Former Smoker     Packs/day: 0.50     Years: 10.00     Types: Cigarettes     Quit date: 8/1/2011     Smokeless tobacco: Never Used      Comment: 1/2 ppd     Alcohol use No      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     Drug use: No     Sexual activity: No     Other Topics  Concern     Not on file     Social History Narrative    Social Documentation:        Balanced Diet: YES    Calcium intake: 1-2 per day    Caffeine: 4-5 cups per day    Exercise:  type of activity limited right now due to foot injury    Sunscreen: No    Seatbelts:  Yes    Self Breast Exam:  Yes    Self Testicular Exam: n/a    Physical/Emotional/Sexual Abuse: No    Do you feel safe in your environment? No-pt lives in [facility]        Cholesterol screen up to date: No-will check today    Eye Exam up to date: Yes    Dental Exam up to date: Yes    Pap smear up to date: Does Not Apply    Mammogram up to date: Yes-10/07    Dexa Scan up to date: Yes-2006    Colonoscopy up to date: Yes-2006    Immunizations up to date: Yes-td 2002    Glucose screen if over 40:  Yes    '09                             Family History:     Family History   Problem Relation Age of Onset     C.A.D. Mother 63     MI- first at age 63     HEART DISEASE Mother      Hypertension Mother      CEREBROVASCULAR DISEASE Mother      Hyperlipidemia Mother      Alcohol/Drug Father      Alzheimer Disease Father      Dementia Father      Hypertension Father      Hyperlipidemia Father      C.A.D. Sister 52     Minor MI- age 50's     HEART DISEASE Sister      Hypertension Sister      Hypertension Sister      Hypertension Brother      Cancer - colorectal Sister 48     Late 40's early 50's     Prostate Cancer Brother 74     Dx'd age 74     GASTROINTESTINAL DISEASE Sister      Diverticulitis     GASTROINTESTINAL DISEASE Brother      Diverticulitis     Lipids Sister      Lipids Sister      Parkinsonism Brother      DIABETES Sister      HEART DISEASE Sister      CHF     CANCER Sister      lung, smoker     Substance Abuse Sister      Substance Abuse Brother      Asthma Sister      CANCER Sister      Breast Cancer Daughter      Prostate Cancer Brother      Hyperlipidemia Brother              Immunization:     Immunization History   Administered Date(s) Administered      Influenza (High Dose) 3 valent vaccine 10/11/2013, 10/14/2014, 12/31/2015, 11/11/2016     Influenza (IIV3) 11/24/2003, 10/08/2004, 10/28/2005, 11/01/2006, 09/01/2010, 10/17/2011, 09/21/2012     Pneumococcal (PCV 13) 10/30/2015     Pneumococcal 23 valent 11/03/2010     TD (ADULT, 7+) 01/15/1993, 11/05/2002     TDAP Vaccine (Adacel) 11/03/2010     Zoster vaccine, live 10/05/2009              Review of Systems:   I have done 10 points of review systems and pertinent findings are as above, otherwise negative.             Physical Examination:   General: Alert, oriented, not in distress  B/P: 125/82, T: 98.1, P: 85, R: 18  HEENT: neck supple, symmetrical, no lymph node enlargement, thyromegaly, bruit, JVD, pupils are isocoric and equally responsive to the light.   Lungs: both hemithoraces are symmetrical, normal to palpation, no dullness to percussion, auscultation of lungs revealed good air entry  CVS: Normal S1 and S2, no additional heart sounds, murmur, rub, normal peripheral pulses  Abdomen: Bowel sounds present, soft, non tender, non distended, no organomegaly, ascitis, mass  Extremities/musculoskeletal: no peripheral edema, deformity, cyanosis, clubbing  Neurology: alert, orientedx3, no motor/sensorial deficits, cranial nerves grossly normal  Skin: no rash  Psychiatry: Mood and affect are appropriate             DATA:     CBC RESULTS:     Recent Labs   Lab Test  09/12/17   1717  08/30/17   1139   WBC  11.6*  13.7*   RBC  4.67  4.84   HGB  13.1  13.2   HCT  41.4  41.7   PLT  244  263       Basic Metabolic Panel:  Recent Labs   Lab Test  08/30/17   1045  08/01/17   1146   NA  137  139   POTASSIUM  3.8  3.5   CHLORIDE  101  104   CO2  24  31   BUN  22  21   CR  0.88  0.90   GLC  159*  98   CLAUDY  8.8  9.0       INR  Recent Labs   Lab Test  08/30/17   1139 08/01/17   INR  2.09*  2.0*        PFT   PFT Latest Ref Rng & Units 6/21/2017   FVC L 1.76   FEV1 L 1.35   FVC% % 63   FEV1% % 63               Thank you for allowing  me participate in the care of Betty Tee.      Meño Walsh M.D.  Associate Professor of Medicine  Pulmonary, Allergy, Critical Care and Sleep

## 2017-10-05 ENCOUNTER — TELEPHONE (OUTPATIENT)
Dept: FAMILY MEDICINE | Facility: CLINIC | Age: 77
End: 2017-10-05

## 2017-10-05 DIAGNOSIS — Z79.01 LONG TERM CURRENT USE OF ANTICOAGULANT THERAPY: Primary | ICD-10-CM

## 2017-10-05 DIAGNOSIS — I82.409 DVT (DEEP VENOUS THROMBOSIS) (H): ICD-10-CM

## 2017-10-05 DIAGNOSIS — I48.91 ATRIAL FIBRILLATION WITH CONTROLLED VENTRICULAR RESPONSE (H): ICD-10-CM

## 2017-10-05 DIAGNOSIS — F33.1 MAJOR DEPRESSIVE DISORDER, RECURRENT EPISODE, MODERATE (H): ICD-10-CM

## 2017-10-05 RX ORDER — PAROXETINE 10 MG/1
TABLET, FILM COATED ORAL
Start: 2017-10-05

## 2017-10-05 NOTE — TELEPHONE ENCOUNTER
Address at tomorrow's OV.  JASON Espinoza       Last Written Prescription Date: 8/9/2017  Last Fill Quantity: 30; # refills: 1  Last Office Visit with G, P or [facility] prescribing provider:  9/12/2017   Next 5 appointments (look out 90 days)     Oct 06, 2017  1:00 PM CDT   Office Visit with Ilene Tristan MD   [facility] ([facility])    [address] [facility] [facility] [address] [phone]                   Last PHQ-9 score on record=   PHQ-9 SCORE 7/5/2017   Total Score -   Total Score MyChart -   Total Score 5       Lab Results   Component Value Date    AST 40 08/01/2017     Lab Results   Component Value Date    ALT 45 08/01/2017

## 2017-10-06 ENCOUNTER — TELEPHONE (OUTPATIENT)
Dept: FAMILY MEDICINE | Facility: CLINIC | Age: 77
End: 2017-10-06
Payer: MEDICARE

## 2017-10-06 ENCOUNTER — OFFICE VISIT (OUTPATIENT)
Dept: FAMILY MEDICINE | Facility: CLINIC | Age: 77
End: 2017-10-06
Payer: MEDICARE

## 2017-10-06 VITALS
HEART RATE: 94 BPM | BODY MASS INDEX: 33.11 KG/M2 | TEMPERATURE: 98.4 F | OXYGEN SATURATION: 94 % | DIASTOLIC BLOOD PRESSURE: 60 MMHG | WEIGHT: 206 LBS | HEIGHT: 66 IN | SYSTOLIC BLOOD PRESSURE: 110 MMHG | RESPIRATION RATE: 20 BRPM

## 2017-10-06 DIAGNOSIS — Z79.01 LONG TERM CURRENT USE OF ANTICOAGULANT THERAPY: ICD-10-CM

## 2017-10-06 DIAGNOSIS — I48.91 ATRIAL FIBRILLATION WITH CONTROLLED VENTRICULAR RESPONSE (H): ICD-10-CM

## 2017-10-06 DIAGNOSIS — I50.30 (HFPEF) HEART FAILURE WITH PRESERVED EJECTION FRACTION (H): ICD-10-CM

## 2017-10-06 DIAGNOSIS — Z79.01 LONG TERM CURRENT USE OF ANTICOAGULANT THERAPY: Primary | ICD-10-CM

## 2017-10-06 DIAGNOSIS — Z79.4 TYPE 2 DIABETES MELLITUS WITH HYPERGLYCEMIA, WITH LONG-TERM CURRENT USE OF INSULIN (H): ICD-10-CM

## 2017-10-06 DIAGNOSIS — R19.7 DIARRHEA, UNSPECIFIED TYPE: ICD-10-CM

## 2017-10-06 DIAGNOSIS — J84.9 ILD (INTERSTITIAL LUNG DISEASE) (H): Primary | ICD-10-CM

## 2017-10-06 DIAGNOSIS — E11.65 TYPE 2 DIABETES MELLITUS WITH HYPERGLYCEMIA, WITH LONG-TERM CURRENT USE OF INSULIN (H): ICD-10-CM

## 2017-10-06 DIAGNOSIS — R10.84 ABDOMINAL PAIN, GENERALIZED: ICD-10-CM

## 2017-10-06 DIAGNOSIS — I10 ESSENTIAL HYPERTENSION WITH GOAL BLOOD PRESSURE LESS THAN 140/90: ICD-10-CM

## 2017-10-06 DIAGNOSIS — R22.1 NECK MASS: ICD-10-CM

## 2017-10-06 DIAGNOSIS — E78.5 HYPERLIPIDEMIA LDL GOAL <100: ICD-10-CM

## 2017-10-06 DIAGNOSIS — M12.9 ARTHROPATHY: ICD-10-CM

## 2017-10-06 DIAGNOSIS — F33.1 MAJOR DEPRESSIVE DISORDER, RECURRENT EPISODE, MODERATE (H): ICD-10-CM

## 2017-10-06 DIAGNOSIS — M35.02 SJOGREN'S SYNDROME WITH LUNG INVOLVEMENT (H): ICD-10-CM

## 2017-10-06 DIAGNOSIS — D72.829 LEUKOCYTOSIS, UNSPECIFIED TYPE: ICD-10-CM

## 2017-10-06 LAB
BASOPHILS # BLD AUTO: 0 10E9/L (ref 0–0.2)
BASOPHILS NFR BLD AUTO: 0.3 %
DIFFERENTIAL METHOD BLD: ABNORMAL
EOSINOPHIL # BLD AUTO: 0.1 10E9/L (ref 0–0.7)
EOSINOPHIL NFR BLD AUTO: 1.2 %
ERYTHROCYTE [DISTWIDTH] IN BLOOD BY AUTOMATED COUNT: 16.5 % (ref 10–15)
HBA1C MFR BLD: 7.3 % (ref 4.3–6)
HCT VFR BLD AUTO: 41 % (ref 35–47)
HGB BLD-MCNC: 13.2 G/DL (ref 11.7–15.7)
INR POINT OF CARE: 1.7 (ref 0.86–1.14)
LYMPHOCYTES # BLD AUTO: 0.9 10E9/L (ref 0.8–5.3)
LYMPHOCYTES NFR BLD AUTO: 7.9 %
MCH RBC QN AUTO: 29.1 PG (ref 26.5–33)
MCHC RBC AUTO-ENTMCNC: 32.2 G/DL (ref 31.5–36.5)
MCV RBC AUTO: 90 FL (ref 78–100)
MONOCYTES # BLD AUTO: 0.8 10E9/L (ref 0–1.3)
MONOCYTES NFR BLD AUTO: 6.6 %
NEUTROPHILS # BLD AUTO: 10 10E9/L (ref 1.6–8.3)
NEUTROPHILS NFR BLD AUTO: 84 %
PLATELET # BLD AUTO: 231 10E9/L (ref 150–450)
RBC # BLD AUTO: 4.54 10E12/L (ref 3.8–5.2)
WBC # BLD AUTO: 11.9 10E9/L (ref 4–11)

## 2017-10-06 PROCEDURE — 85610 PROTHROMBIN TIME: CPT | Mod: QW | Performed by: FAMILY MEDICINE

## 2017-10-06 PROCEDURE — 82043 UR ALBUMIN QUANTITATIVE: CPT | Performed by: FAMILY MEDICINE

## 2017-10-06 PROCEDURE — 99214 OFFICE O/P EST MOD 30 MIN: CPT | Performed by: FAMILY MEDICINE

## 2017-10-06 PROCEDURE — 99207 ZZC NO CHARGE NURSE ONLY: CPT | Performed by: FAMILY MEDICINE

## 2017-10-06 PROCEDURE — 83036 HEMOGLOBIN GLYCOSYLATED A1C: CPT | Performed by: FAMILY MEDICINE

## 2017-10-06 PROCEDURE — 85025 COMPLETE CBC W/AUTO DIFF WBC: CPT | Performed by: FAMILY MEDICINE

## 2017-10-06 PROCEDURE — 36416 COLLJ CAPILLARY BLOOD SPEC: CPT | Performed by: FAMILY MEDICINE

## 2017-10-06 PROCEDURE — 80053 COMPREHEN METABOLIC PANEL: CPT | Performed by: FAMILY MEDICINE

## 2017-10-06 RX ORDER — ESCITALOPRAM OXALATE 10 MG/1
10 TABLET ORAL DAILY
Qty: 30 TABLET | Refills: 1 | Status: SHIPPED | OUTPATIENT
Start: 2017-10-06 | End: 2017-12-03

## 2017-10-06 RX ORDER — ERYTHROMYCIN 500 MG/1
500 TABLET, DELAYED RELEASE ORAL 4 TIMES DAILY
Qty: 56 TABLET | Refills: 0 | COMMUNITY
Start: 2017-10-06 | End: 2017-10-06

## 2017-10-06 RX ORDER — ATORVASTATIN CALCIUM 20 MG/1
20 TABLET, FILM COATED ORAL DAILY
Qty: 90 TABLET | Refills: 0 | Status: SHIPPED | OUTPATIENT
Start: 2017-10-06 | End: 2018-03-15

## 2017-10-06 RX ORDER — POTASSIUM CHLORIDE 1500 MG/1
40 TABLET, EXTENDED RELEASE ORAL DAILY
Qty: 180 TABLET | Refills: 3 | Status: SHIPPED | OUTPATIENT
Start: 2017-10-06 | End: 2018-02-01

## 2017-10-06 NOTE — PROGRESS NOTES
SUBJECTIVE:   Betty Tee is a 77 year old female who presents to clinic today for the following health issues:      Diabetes Follow-up    Patient is checking blood sugars: twice daily.    Blood sugar testing frequency justification: Adjustment of medication(s)  Results are as follows:       am - 139    Diabetic concerns: None     Symptoms of hypoglycemia (low blood sugar): none     Paresthesias (numbness or burning in feet) or sores: No   Date of last diabetic eye exam: 2 months ago      Amount of exercise or physical activity: None    Problems taking medications regularly: No    Medication side effects: none    Diet: regular (no restrictions)    --DM-   -Pt has had three high blood glucose episodes.  9/20/17- glucose was up at 560 at 12:45pm - sx's- wringing wet, SOB going up stairs). Better a few hours later- down to 154.  8/30/17- 468 at 5pm (didn't have sx's, at gathering, after bit of caramel corn), had some nuts, took dose, and back down to 108 at 8pm.  6/17- glucose was over 300, and pharmacist told her to go to the ER.  Didn't give her any meds, triage just had her sit in the lobby, and it went down on it's own.  -No known triggers, pattern with the highs- wasn't ill, no abnl meals or exercise.  Had sweating with two of them, but no sx's with the other ones.  -Rest of levels pretty good- 118, 125,129, 114.  -Meds-metformin 1000mg/d, Lantus 25 units daily, with meals novolog 12 units (plus sliding scale prn).      -Went to Pulmonary on Tues, saw Dr. Walsh.  Very good and thorough.  Did lung xray (negative for pneumonia),a walking test, and a breathing test.  Sx's- congestion/gunk just won't go away, so he started her on azithromycin, and wanted her to use albuterol 3x/d x 1 wk, then prn.  Gave her a spacer to use with the albuterol.    Other sx's- wakes up with headache until she can blow her nose and spit and get the gunk out.    Feels like allergies- wondering about the singulair.    -Wt gain, down  to 200, now up- at 206 lbs at home this morning at home.    -Med review-  Went through pt's home medications to review which medication is for what condition, and wrote down notes in her .    Rheum- tried to wean down off 10mg/d prednisone, but had return jt pains (knees, hips and hands) at 8mg/d, so went back up to 10mg/d.    -GI sx's- does feel better, no diarrhea today.  R pelvic pain, none in the last couple days, and has been getting better.    -MDD  Discussed paxil.  Pt has been on this for years, and we've discussed changing to something else periodically, but other medical issues have taken precedent.  Pt is up for trying to switch off paxil to something else.  Feels like she needs to be on something.  Currently on 10mg/d.          Problem list and histories reviewed & adjusted, as indicated.  Additional history: as documented    Patient Active Problem List   Diagnosis     Temporomandibular joint disorder     Diverticulitis of colon     Disorder of bone and cartilage     Osteoarthritis     Alcohol abuse, in remission     Restless legs syndrome (RLS)     Major depressive disorder, recurrent episode, moderate     Essential hypertension with goal blood pressure less than 140/90     Sciatica     Advanced directives, counseling/discussion     Colouterine fistula     Atrial fibrillation with controlled ventricular response (H)     Left ventricular hypertrophy     Health Care Home     Insomnia     Joint pains     Allergic reaction caused by a drug - likely plaquenil     Breast fibroadenoma     DVT (deep venous thrombosis) (H)     Fatty liver disease, nonalcoholic     Rib pain     Neck mass     Gastroesophageal reflux disease without esophagitis     Enlarged lymph node     Sjogren's syndrome with lung involvement (H)     ANGELICA (obstructive sleep apnea)     ILD (interstitial lung disease) (H)     Lower GI bleed     Acute and chronic respiratory failure with hypoxia (H)     (HFpEF) heart failure with  preserved ejection fraction (H)     Type 2 diabetes mellitus with hyperglycemia, with long-term current use of insulin (H)     Heart failure, chronic, with acute decompensation (H)     Hyperlipidemia LDL goal <100     Long term current use of anticoagulant therapy     Past Surgical History:   Procedure Laterality Date     BACK SURGERY  1962     BIOPSY BREAST  9/27/02    Biopsy Left Breast     C APPENDECTOMY  1970's?     C NONSPECIFIC PROCEDURE  11/05    exploratory abd lap, adhesions, resolved RLQ pain, diverticulitis episodes     CARDIAC SURGERY       CHOLECYSTECTOMY  1990's?     COLECTOMY LEFT  11/7/2011    Procedure:COLECTOMY LEFT; Laparoscopic mobilization of splenic flexture, sigmoid colectomy, coloprotoscopy, loop illeostomy; Surgeon:CK CASTANEDA; Location:[facility]     HYSTERECTOMY TOTAL ABDOMINAL, BILATERAL SALPINGO-OOPHORECTOMY, COMBINED  11/7/2011    Procedure:COMBINED HYSTERECTOMY TOTAL ABDOMINAL, BILATERAL SALPINGO-OOPHORECTOMY; total abdominal hysterectomy, bilateral salpingo-oophorectomy; Surgeon:ALETA MANUEL; Location:[facility]     INSERT STENT URETER  11/7/2011    Procedure:INSERT STENT URETER; Placement of Bilateral Ureteral Stents ; Surgeon:PRANEETH BRYANT; Location:[facility]     SIGMOIDOSCOPY FLEXIBLE  11/3/2011    Procedure:SIGMOIDOSCOPY FLEXIBLE; Flexible Sigmoidoscopy; Surgeon:CK CASTANEDA; Location:[facility]     TAKEDOWN ILEOSTOMY  2/1/2012    Procedure:TAKEDOWN ILEOSTOMY; Takedown Loop Ileostomy ; Surgeon:CK CASTANEDA; Location:[facility]       Social History   Substance Use Topics     Smoking status: Former Smoker     Packs/day: 0.50     Years: 10.00     Types: Cigarettes     Quit date: 8/1/2011     Smokeless tobacco: Never Used      Comment: 1/2 ppd     Alcohol use No      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     Family History   Problem Relation Age of Onset     C.A.D. Mother 63     MI- first at age 63     HEART DISEASE Mother      Hypertension Mother      CEREBROVASCULAR DISEASE Mother       Hyperlipidemia Mother      Alcohol/Drug Father      Alzheimer Disease Father      Dementia Father      Hypertension Father      Hyperlipidemia Father      C.A.D. Sister 52     Minor MI- age 50's     HEART DISEASE Sister      Hypertension Sister      Hypertension Sister      Hypertension Brother      Cancer - colorectal Sister 48     Late 40's early 50's     Prostate Cancer Brother 74     Dx'd age 74     GASTROINTESTINAL DISEASE Sister      Diverticulitis     GASTROINTESTINAL DISEASE Brother      Diverticulitis     Lipids Sister      Lipids Sister      Parkinsonism Brother      DIABETES Sister      HEART DISEASE Sister      CHF     CANCER Sister      lung, smoker     Substance Abuse Sister      Substance Abuse Brother      Asthma Sister      CANCER Sister      Breast Cancer Daughter      Prostate Cancer Brother      Hyperlipidemia Brother          Current Outpatient Prescriptions   Medication Sig Dispense Refill     atorvastatin (LIPITOR) 20 MG tablet Take 1 tablet (20 mg) by mouth daily 90 tablet 0     potassium chloride SA (K-DUR/KLOR-CON M) 20 MEQ CR tablet Take 2 tablets (40 mEq) by mouth daily 180 tablet 3     escitalopram (LEXAPRO) 10 MG tablet Take 1 tablet (10 mg) by mouth daily 30 tablet 1     azithromycin (ZITHROMAX) 250 MG tablet Two tablets first day, then one tablet daily for four days. 6 tablet 0     metFORMIN (GLUCOPHAGE-XR) 500 MG 24 hr tablet TAKE 2 TABLETS (1,000 MG) BY MOUTH DAILY (WITH DINNER) 180 tablet 0     furosemide (LASIX) 40 MG tablet Take 1 tablet (40 mg) by mouth 2 times daily 180 tablet 3     NOVOLOG FLEXPEN 100 UNIT/ML soln INJECT 12 UNITS SUBCUTANEOUS 3 TIMES DAILY (WITH MEALS) 15 mL 1     PARoxetine (PAXIL) 10 MG tablet TAKE 1 TABLET (10MG) BY MOUTH AT BEDTIME (Patient not taking: Reported on 10/19/2017) 30 tablet 1     montelukast (SINGULAIR) 10 MG tablet Take 1 tablet (10 mg) by mouth At Bedtime 90 tablet 1     warfarin (COUMADIN) 7.5 MG tablet Current dose is 7.5 mg daily.   Dose adjusted per INR result. 100 tablet 0     spironolactone (ALDACTONE) 25 MG tablet Take 2 tablets (50 mg) by mouth daily 180 tablet 3     traZODone (DESYREL) 50 MG tablet TAKE 1/2-1 TABLET BY MOUTH NIGHTLY AS NEEDED, CAN TITRATE DOWN TO 1/2TAB OR UP TO 2TABS AS NEEDED 90 tablet 1     azithromycin (ZITHROMAX) 250 MG tablet Take 2 tablets (500 mg) the first day, then take 1 tablet (250 mg) by mouth daily for a total of 5 days. 6 tablet 0     alendronate (FOSAMAX) 70 MG tablet Take 1 tablet (70 mg) by mouth every 7 days 4 tablet 11     lisinopril (PRINIVIL/ZESTRIL) 2.5 MG tablet TAKE 1 TABLET (2.5 MG) BY MOUTH DAILY 90 tablet 0     LANTUS SOLOSTAR 100 UNIT/ML soln INJECT 25 UNTIS SUBCUTANEOUSLY EVERY DAY 15 mL 0     blood glucose monitoring (NO BRAND SPECIFIED) test strip Use to test blood sugars 4 times daily or as directed 300 strip 3     blood glucose monitoring (ACCU-CHEK MULTICLIX) lancets Use to test blood sugar 4 times daily or as directed. 4 Box 3     insulin pen needle (B-D U/F) 31G X 8 MM Use 4 times daily (with Lantus and Novolog) or as directed. 400 each 3     insulin pen needle (BD JANE U/F) 32G X 4 MM Use 4 daily as directed. 200 each 11     blood glucose monitoring (ACCU-CHEK SHANNON PLUS) test strip Use to test blood sugar 4 times daily or as directed.  Ok to substitute alternative if insurance prefers. 120 strip 11     blood glucose monitoring (ACCU-CHEK FASTCLIX) lancets Use to test blood sugar 4 times daily or as directed.  Ok to substitute alternative if insurance prefers. 1 Box 11     predniSONE (DELTASONE) 10 MG tablet Take 2 tablets for three days (4/12-14), then take 1 tablet daily (10 mg daily starting 4/15) 90 tablet 3     pantoprazole (PROTONIX) 20 MG EC tablet Take 1-2 tablets (20-40 mg) by mouth daily 60 tablet 1     ketoconazole (NIZORAL) 2 % cream Apply topically 2 times daily 60 g 1     metoprolol (TOPROL-XL) 100 MG 24 hr tablet Take 1 tablet (100 mg) by mouth daily 90 tablet 3      albuterol (PROAIR HFA, PROVENTIL HFA, VENTOLIN HFA) 108 (90 BASE) MCG/ACT inhaler Inhale 2 puffs into the lungs every 6 hours 1 Inhaler 3     order for DME Oxygen: Patient requires supplemental Oxygen 4 LPM via nasal canula with activity. Please provide patient with a home unit and with portability capability. Oxygen will be for a lifetime. 1 Device 0     polyethylene glycol (MIRALAX/GLYCOLAX) packet Take 17 g by mouth daily as needed for constipation 7 packet 0     acyclovir (ZOVIRAX) 5 % ointment Apply topically 6 times daily (Patient taking differently: Apply topically 6 times daily As needed for outbreaks) 15 g 3     fluticasone (FLOVENT HFA) 220 MCG/ACT inhaler Inhale 2 puffs into the lungs 2 times daily 3 Inhaler 3     acyclovir (ZOVIRAX) 400 MG tablet Take 400 mg by mouth See Admin Instructions 5 times daily as needed for outbreaks       fluticasone (FLONASE) 50 MCG/ACT nasal spray Spray 1-2 sprays into both nostrils daily (Patient taking differently: Spray 1-2 sprays into both nostrils daily as needed for allergies ) 16 g 5     COMPRESSION STOCKINGS Wear compression stockings in affected leg (right leg) or both legs most of the time during the day and take them off at night. 2 each 2     acetaminophen (TYLENOL) 500 MG tablet Take 500 mg by mouth nightly as needed        Allergies   Allergen Reactions     Augmentin Nausea and Vomiting     Codeine Nausea and Vomiting     Phenobarbital Itching     Recent Labs   Lab Test  10/26/17   1457  10/06/17   1421   08/01/17   1146  07/05/17   1233   06/22/17   1700   04/04/17   0650   04/02/17   2147   09/20/16   1045   08/06/14   1116   A1C   --   7.3*   --    --   6.4*   --    --    --   14.3*   --    --    < >   --    < >   --    LDL   --    --    --    --    --    --   13   --    --    --    --    --   56   --   59   HDL   --    --    --    --    --    --   62   --    --    --    --    --   72   --   57   TRIG   --    --    --    --    --    --   132   --    --    --  "   --    --   123   --   129   ALT   --   43   --   45   --    --    --    --    --    --   19   < >   --    < >  52*   CR  0.89  0.94   < >  0.90  0.82   < >  0.80   < >   --    < >  0.58   < >   --    < >  0.82   GFRESTIMATED  62  58*   < >  61  68   < >  69   < >   --    < >  >90  Non  GFR Calc     < >   --    < >  68   GFRESTBLACK  75  70   < >  74  82   < >  84   < >   --    < >  >90   GFR Calc     < >   --    < >  83   POTASSIUM  3.6  4.3   < >  3.5  3.7   < >  3.0*   < >   --    < >  3.3*   < >   --    < >  4.1    < > = values in this interval not displayed.      BP Readings from Last 3 Encounters:   10/26/17 113/76   10/19/17 139/85   10/06/17 110/60    Wt Readings from Last 3 Encounters:   10/26/17 210 lb 4.8 oz (95.4 kg)   10/19/17 210 lb 1.6 oz (95.3 kg)   10/06/17 206 lb (93.4 kg)              Labs reviewed in EPIC      Reviewed and updated as needed this visit by clinical staffTobacco  Allergies  Meds  Problems       Reviewed and updated as needed this visit by Provider  Allergies  Meds  Problems         ROS:  Constitutional, HEENT, cardiovascular, pulmonary, gi and gu systems are negative, except as otherwise noted.      OBJECTIVE:   /60 (BP Location: Right arm, Cuff Size: Adult Large)  Pulse 94  Temp 98.4  F (36.9  C) (Oral)  Resp 20  Ht 5' 6\" (1.676 m)  Wt 206 lb (93.4 kg)  SpO2 94%  BMI 33.25 kg/m2  Body mass index is 33.25 kg/(m^2).  GENERAL APPEARANCE: pleasant, alert and no distress     EYES: PERRL, sclera clear     HENT: nose and mouth without ulcers or lesions     NECK: no adenopathy, no asymmetry, masses, or scars and thyroid normal to palpation     RESP: lungs clear to auscultation - no rales, rhonchi or wheezes     CV: irregular rhythm, normal S1 S2, no S3 or S4 and no murmur, click or rub      Abdomen: soft, nontender, no HSM or masses and bowel sounds normal     Ext: warm, dry, trace bilateral lower leg edema       ASSESSMENT/PLAN:       " ICD-10-CM    1. ILD (interstitial lung disease) (H) J84.9 Comprehensive metabolic panel (BMP + Alb, Alk Phos, ALT, AST, Total. Bili, TP)   2. Sjogren's syndrome with lung involvement (H) M35.02    3. Diarrhea, unspecified type R19.7    4. Abdominal pain, generalized R10.84    5. 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 Type 2 diabetes mellitus with hyperglycemia, with long-term current use of insulin (H) E11.65 Albumin Random Urine Quantitative with Creat Ratio    Z79.4 Hemoglobin A1c     Comprehensive metabolic panel (BMP + Alb, Alk Phos, ALT, AST, Total. Bili, TP)   7. Major depressive disorder, recurrent episode, moderate F33.1 escitalopram (LEXAPRO) 10 MG tablet   8. Leukocytosis, unspecified type D72.829 CBC with platelets and differential   9. (HFpEF) heart failure with preserved ejection fraction (H) I50.30 potassium chloride SA (K-DUR/KLOR-CON M) 20 MEQ CR tablet   10. Atrial fibrillation with controlled ventricular response (H) I48.91 INR point of care   11. Essential hypertension with goal blood pressure less than 140/90 I10 Albumin Random Urine Quantitative with Creat Ratio     Comprehensive metabolic panel (BMP + Alb, Alk Phos, ALT, AST, Total. Bili, TP)   12. Long term current use of anticoagulant therapy Z79.01 Comprehensive metabolic panel (BMP + Alb, Alk Phos, ALT, AST, Total. Bili, TP)   13. Hyperlipidemia LDL goal <100 E78.5 atorvastatin (LIPITOR) 20 MG tablet   14. Neck mass R22.1      Pulmonary- appreciate Dr. Walsh's care. Pt now on azithromycin x 2-3 days.  Will recheck CBC and see if improved.      GI sx's/diarrhea/R pelvic pain- these are continuing to improve per pt.  Will call/rtc if they worsen again.    Medications reviewed and updated her home medication list.    Rheum- prednisone back up to 10mg/d with return of joint pains at 8mg/d.  Cont f/u with Rheum.  Reminded that she was to schedule a f/u visit in 9/17.    DM- few episodes of quite high glucose readings, one  very high at 560.  No known pattern to these.  Rest of levels with quite good control (<120s).  A1C today back up a bit to 7.3 (up from 6.4).  Will cont to monitor.  If continues to have high's, would like her to see MTM.    MDD- Due to concerns with paxil use in elderly pts, will try switching to lexapro.  Wean down off paxil- now at 10mg/d.  First week- Lower dose to 5mg daily for a week.  Second week- lower paxil dose to 5mg every other day, and start lexapro 10mg every day.    HTN/HFpEF/A.fib- BP in good control- on lasix, spironolactone, lisinopril 2.5mg/d, and toprol XL.   Continues close f/u with cardiology and heart failure team.  Continues on coumadin    RTC in 6 wks, sooner if concerns.        Ilene Tristan MD  [facility]

## 2017-10-06 NOTE — NURSING NOTE
"Chief Complaint   Patient presents with     Diabetes     initial /60 (BP Location: Right arm, Cuff Size: Adult Large)  Pulse 94  Temp 98.4  F (36.9  C) (Oral)  Resp 20  Ht 5' 6\" (1.676 m)  Wt 206 lb (93.4 kg)  SpO2 94%  BMI 33.25 kg/m2 Estimated body mass index is 33.25 kg/(m^2) as calculated from the following:    Height as of this encounter: 5' 6\" (1.676 m).    Weight as of this encounter: 206 lb (93.4 kg).  BP completed using cuff size: large.   R arm      Health Maintenance that is potentially due pending provider review:  NONE    n/a    Taco Parra ma  "

## 2017-10-06 NOTE — TELEPHONE ENCOUNTER
ANTICOAGULATION FOLLOW-UP CLINIC VISIT    Patient Name:  Betty Tee  Date:  10/6/2017  Contact Type:  Telephone    SUBJECTIVE:  Bleeding Signs/Symptoms: None  Thromboembolic Signs/Symptoms: None    Medication Changes:  No  Dietary Changes:  No  Bacterial/Viral Infection:  Yes: Zpak started 10/4/2017    Missed Coumadin Doses:  None  Other Concerns:  No      ASSESSMENT/PLAN:  See: ANTICOAGULATION QIC flow sheet.    INR 1.7  Take 11.25mg today  Then resume 7.5mg daily (52.5mg/wk)  Recheck INR 2 weeks  (Huddled with CW to determine dosing as OV note from today incomplete)  Patient informed and verbalized understanding  Lydia HALEY RN    Dosage adjustment made based on physician directed care plan.    JIMI VOGT

## 2017-10-06 NOTE — LETTER
My Depression Action Plan  Name: Betty Tee   Date of Birth 1940  Date: 10/6/2017    My doctor: Ilene Tristan   My clinic: [facility]  [address]  [facility] [id number] [phone]          GREEN    ZONE   Good Control    What it looks like:     Things are going generally well. You have normal up s and down s. You may even feel depressed from time to time, but bad moods usually last less than a day.   What you need to do:  1. Continue to care for yourself (see self care plan)  2. Check your depression survival kit and update it as needed  3. Follow your physician s recommendations including any medication.  4. Do not stop taking medication unless you consult with your physician first.           YELLOW         ZONE Getting Worse    What it looks like:     Depression is starting to interfere with your life.     It may be hard to get out of bed; you may be starting to isolate yourself from others.    Symptoms of depression are starting to last most all day and this has happened for several days.     You may have suicidal thoughts but they are not constant.   What you need to do:     1. Call your care team, your response to treatment will improve if you keep your care team informed of your progress. Yellow periods are signs an adjustment may need to be made.     2. Continue your self-care, even if you have to fake it!    3. Talk to someone in your support network    4. Open up your depression survival kit           RED    ZONE Medical Alert - Get Help    What it looks like:     Depression is seriously interfering with your life.     You may experience these or other symptoms: You can t get out of bed most days, can t work or engage in other necessary activities, you have trouble taking care of basic hygiene, or basic responsibilities, thoughts of suicide or death that will not go away, self-injurious behavior.     What you need to do:  1. Call your care  team and request a same-day appointment. If they are not available (weekends or after hours) call your local crisis line, emergency room or 911.      Electronically signed by: Taco Parra, October 6, 2017    Depression Self Care Plan / Survival Kit    Self-Care for Depression  Here s the deal. Your body and mind are really not as separate as most people think.  What you do and think affects how you feel and how you feel influences what you do and think. This means if you do things that people who feel good do, it will help you feel better.  Sometimes this is all it takes.  There is also a place for medication and therapy depending on how severe your depression is, so be sure to consult with your medical provider and/ or Behavioral Health Consultant if your symptoms are worsening or not improving.     In order to better manage my stress, I will:    Exercise  Get some form of exercise, every day. This will help reduce pain and release endorphins, the  feel good  chemicals in your brain. This is almost as good as taking antidepressants!  This is not the same as joining a gym and then never going! (they count on that by the way ) It can be as simple as just going for a walk or doing some gardening, anything that will get you moving.      Hygiene   Maintain good hygiene (Get out of bed in the morning, Make your bed, Brush your teeth, Take a shower, and Get dressed like you were going to work, even if you are unemployed).  If your clothes don't fit try to get ones that do.    Diet  I will strive to eat foods that are good for me, drink plenty of water, and avoid excessive sugar, caffeine, alcohol, and other mood-altering substances.  Some foods that are helpful in depression are: complex carbohydrates, B vitamins, flaxseed, fish or fish oil, fresh fruits and vegetables.    Psychotherapy  I agree to participate in Individual Therapy (if recommended).    Medication  If prescribed medications, I agree to take them.   Missing doses can result in serious side effects.  I understand that drinking alcohol, or other illicit drug use, may cause potential side effects.  I will not stop my medication abruptly without first discussing it with my provider.    Staying Connected With Others  I will stay in touch with my friends, family members, and my primary care provider/team.    Use your imagination  Be creative.  We all have a creative side; it doesn t matter if it s oil painting, sand castles, or mud pies! This will also kick up the endorphins.    Witness Beauty  (AKA stop and smell the roses) Take a look outside, even in mid-winter. Notice colors, textures. Watch the squirrels and birds.     Service to others  Be of service to others.  There is always someone else in need.  By helping others we can  get out of ourselves  and remember the really important things.  This also provides opportunities for practicing all the other parts of the program.    Humor  Laugh and be silly!  Adjust your TV habits for less news and crime-drama and more comedy.    Control your stress  Try breathing deep, massage therapy, biofeedback, and meditation. Find time to relax each day.     My support system    Clinic Contact:  Phone number:    Contact 1:  Phone number:    Contact 2:  Phone number:    Synagogue/:  Phone number:    Therapist:  Phone number:    Local crisis center:    Phone number:    Other community support:  Phone number:

## 2017-10-06 NOTE — PATIENT INSTRUCTIONS
Plan-  Paxil wean-  First week- lower paxil dose to 5mg/day (1/2 tab daily).  Second week- paxil 5mg alternating with lexapro 10mg- take one tab daily of either paxil 1/2 tab or lexapro full tab.  Third week- take lexapro 10mg daily (and continue on this dose).    Follow-up here in six weeks to reassess mood.

## 2017-10-07 LAB
ALBUMIN SERPL-MCNC: 3.1 G/DL (ref 3.4–5)
ALP SERPL-CCNC: 121 U/L (ref 40–150)
ALT SERPL W P-5'-P-CCNC: 43 U/L (ref 0–50)
ANION GAP SERPL CALCULATED.3IONS-SCNC: 8 MMOL/L (ref 3–14)
AST SERPL W P-5'-P-CCNC: 37 U/L (ref 0–45)
BILIRUB SERPL-MCNC: 0.6 MG/DL (ref 0.2–1.3)
BUN SERPL-MCNC: 22 MG/DL (ref 7–30)
CALCIUM SERPL-MCNC: 9 MG/DL (ref 8.5–10.1)
CHLORIDE SERPL-SCNC: 104 MMOL/L (ref 94–109)
CO2 SERPL-SCNC: 27 MMOL/L (ref 20–32)
CREAT SERPL-MCNC: 0.94 MG/DL (ref 0.52–1.04)
CREAT UR-MCNC: 46 MG/DL
GFR SERPL CREATININE-BSD FRML MDRD: 58 ML/MIN/1.7M2
GLUCOSE SERPL-MCNC: 147 MG/DL (ref 70–99)
MICROALBUMIN UR-MCNC: 14 MG/L
MICROALBUMIN/CREAT UR: 29.65 MG/G CR (ref 0–25)
POTASSIUM SERPL-SCNC: 4.3 MMOL/L (ref 3.4–5.3)
PROT SERPL-MCNC: 7.8 G/DL (ref 6.8–8.8)
SODIUM SERPL-SCNC: 139 MMOL/L (ref 133–144)

## 2017-10-09 NOTE — PROGRESS NOTES
Here are your lab results from your recent visit...  -Your CMP (which includes electrolyte levels, blood sugar levels, and kidney and liver function tests) looks good other than a slightly low GFR (the flow rate through your kidneys), the higher glucose (not surprising), and a slightly low albumin.  We'll want to continue to monitor these values.  -Your urine albumin level (which is the urine test that can signal signs of early chronic kidney disease if elevated to >30) is borderline again at '26'.  We'll continue to monitor this fairly closely as well.  The best things we can do for your kidneys is continue to try and keep your blood pressure and diabetes under good control.  -Your hemoglobin A1C (the three month average of your glucose levels) is a bit higher at 7.3 as we discussed.  This is still in a good range, but we'll want to make sure that it doesn't go much higher.   -Your CBC labs (which includes blood labs looking for signs of infection or anemia) still showed the just slightly high white count.  We should check this again when you return as well.    In going through your chart after your appointment, I reviewed your last rheumatology appointment note from 6/17, and they recommended making a follow-up appointment in three months.  I don't see that a future appointment has been made in your chart, so I would go ahead and make one in the near future.    Please let me know if you have any questions or concerns.  Lev Langley MD

## 2017-10-14 DIAGNOSIS — E11.65 TYPE 2 DIABETES MELLITUS WITH HYPERGLYCEMIA, WITH LONG-TERM CURRENT USE OF INSULIN (H): ICD-10-CM

## 2017-10-14 DIAGNOSIS — Z79.4 TYPE 2 DIABETES MELLITUS WITH HYPERGLYCEMIA, WITH LONG-TERM CURRENT USE OF INSULIN (H): ICD-10-CM

## 2017-10-14 DIAGNOSIS — I10 ESSENTIAL HYPERTENSION WITH GOAL BLOOD PRESSURE LESS THAN 140/90: ICD-10-CM

## 2017-10-16 RX ORDER — LISINOPRIL 2.5 MG/1
TABLET ORAL
Qty: 90 TABLET | Refills: 0 | Status: SHIPPED | OUTPATIENT
Start: 2017-10-16 | End: 2018-01-15

## 2017-10-16 RX ORDER — INSULIN GLARGINE 100 [IU]/ML
INJECTION, SOLUTION SUBCUTANEOUS
Qty: 15 ML | Refills: 0 | Status: SHIPPED | OUTPATIENT
Start: 2017-10-16 | End: 2017-12-02

## 2017-10-17 ENCOUNTER — RADIANT APPOINTMENT (OUTPATIENT)
Dept: CARDIOLOGY | Facility: CLINIC | Age: 77
End: 2017-10-17
Attending: INTERNAL MEDICINE

## 2017-10-17 DIAGNOSIS — R06.00 DYSPNEA, UNSPECIFIED TYPE: ICD-10-CM

## 2017-10-19 ENCOUNTER — OFFICE VISIT (OUTPATIENT)
Dept: RHEUMATOLOGY | Facility: CLINIC | Age: 77
End: 2017-10-19
Attending: INTERNAL MEDICINE
Payer: MEDICARE

## 2017-10-19 VITALS
HEIGHT: 67 IN | BODY MASS INDEX: 32.98 KG/M2 | OXYGEN SATURATION: 92 % | SYSTOLIC BLOOD PRESSURE: 139 MMHG | WEIGHT: 210.1 LBS | HEART RATE: 98 BPM | DIASTOLIC BLOOD PRESSURE: 85 MMHG

## 2017-10-19 DIAGNOSIS — M81.8 OTHER OSTEOPOROSIS WITHOUT CURRENT PATHOLOGICAL FRACTURE: Primary | ICD-10-CM

## 2017-10-19 PROCEDURE — 99212 OFFICE O/P EST SF 10 MIN: CPT | Mod: ZF

## 2017-10-19 RX ORDER — ALENDRONATE SODIUM 70 MG/1
70 TABLET ORAL
Qty: 4 TABLET | Refills: 11 | Status: SHIPPED | OUTPATIENT
Start: 2017-10-19 | End: 2019-04-04

## 2017-10-19 ASSESSMENT — PAIN SCALES - GENERAL: PAINLEVEL: NO PAIN (0)

## 2017-10-19 NOTE — MR AVS SNAPSHOT
After Visit Summary   10/19/2017    Betty Tee    MRN: [id number]           Patient Information     Date Of Birth          1940        Visit Information        Provider Department      10/19/2017 4:30 PM Carmenza Stringer MD [facility] Rheumatology        Today's Diagnoses     Other osteoporosis without current pathological fracture    -  1      Care Instructions    Start to taper your prednisone dose after you recover from a cold in about 2 weeks: take 9 mg daily for 2-3 weeks. If you feel ok decrease further to 8 mg per day, and stay on 8 mg until next visit.     Start taking fosamax for bone health: 70 mg once a week.          Follow-ups after your visit        Follow-up notes from your care team     Return in about 3 months (around 1/19/2018).      Your next 10 appointments already scheduled     Oct 26, 2017  2:30 PM CDT   Lab with  LAB   [facility] ([facility])    [address] [place] [id number] [phone]            Oct 26, 2017  3:00 PM CDT   (Arrive by 2:45 PM)   RETURN HEART FAILURE with Radha Rosa MD   [facility] ([facility])    [address] [place] [id number] [phone]            Nov 08, 2017  1:00 PM CST   Office Visit with Ilene Tristan MD   [facility] ([facility])    [address]  [facility] [id number] [phone]           Bring a current list of meds and any records pertaining to this visit. For Physicals, please bring immunization records and any forms needing to be filled out. Please arrive 10 minutes early to complete paperwork.            Nov 15, 2017  3:30 PM CST   FULL PULMONARY FUNCTION with  PFL D   [facility] Pulmonary Function Testing ([facility])    [address] [place] [id number] [phone]            Nov 15,  "2017  4:30 PM CST   (Arrive by 4:15 PM)   Return Interstitial Lung with Meño Walsh MD   [facility] ([facility])    [address] [facility]  [address] [facility] [address] [phone]              Who to contact     If you have questions or need follow up information about today's clinic visit or your schedule please contact [facility] RHEUMATOLOGY directly at [phone].  Normal or non-critical lab and imaging results will be communicated to you by [employer], letter or phone within 4 business days after the clinic has received the results. If you do not hear from us within 7 days, please contact the clinic through [employer] or phone. If you have a critical or abnormal lab result, we will notify you by phone as soon as possible.  Submit refill requests through [employer] or call your pharmacy and they will forward the refill request to us. Please allow 3 business days for your refill to be completed.          Additional Information About Your Visit        [employer] Information     [employer] gives you secure access to your electronic health record. If you see a primary care provider, you can also send messages to your care team and make appointments. If you have questions, please call your primary care clinic.  If you do not have a primary care provider, please call [phone] and they will assist you.        Care EveryWhere ID     This is your Care EveryWhere ID. This could be used by other organizations to access your [facility] medical records  [id number]        Your Vitals Were     Pulse Height Pulse Oximetry BMI (Body Mass Index)          98 1.702 m (5' 7\") 92% 32.91 kg/m2         Blood Pressure from Last 3 Encounters:   10/19/17 139/85   10/06/17 110/60   10/04/17 125/82    Weight from Last 3 Encounters:   10/19/17 95.3 kg (210 lb 1.6 oz)   10/06/17 93.4 kg (206 lb)   10/04/17 93 kg (205 lb)              Today, you had the following     No orders found for " display         Today's Medication Changes          These changes are accurate as of: 10/19/17  5:14 PM.  If you have any questions, ask your nurse or doctor.               Start taking these medicines.        Dose/Directions    alendronate 70 MG tablet   Commonly known as:  FOSAMAX   Used for:  Other osteoporosis without current pathological fracture   Started by:  Carmenza Stringer MD        Dose:  70 mg   Take 1 tablet (70 mg) by mouth every 7 days   Quantity:  4 tablet   Refills:  11         These medicines have changed or have updated prescriptions.        Dose/Directions    acyclovir 5 % ointment   Commonly known as:  ZOVIRAX   This may have changed:  additional instructions   Used for:  Recurrent cold sores        Apply topically 6 times daily   Quantity:  15 g   Refills:  3       fluticasone 50 MCG/ACT spray   Commonly known as:  FLONASE   This may have changed:    - when to take this  - reasons to take this   Used for:  Seasonal allergic rhinitis        Dose:  1-2 spray   Spray 1-2 sprays into both nostrils daily   Quantity:  16 g   Refills:  5            Where to get your medicines      These medications were [id number] [place] [facility] [address] [address]     Phone:  [phone]     alendronate 70 MG tablet                Primary Care Provider Office Phone # Fax #    Ilene Tristan [phone] [address] [address]        Equal Access to Services     [facility] AH: Hadii jossue del cido Sojefry, waaxda luqadaha, qaybta kaalmada adeegyada, anderson shaikh. So [facility] [phone].    ATENCIÓN: Si habla español, tiene a conti disposición servicios gratuitos de asistencia lingüística. Noel carpenter [phone].    We comply with applicable federal civil rights laws and [place] laws. We do not discriminate on the basis of race, color, national  origin, age, disability, sex, sexual orientation, or gender identity.            Thank you!     Thank you for choosing [facility] RHEUMATOLOGY  for your care. Our goal is always to provide you with excellent care. Hearing back from our patients is one way we can continue to improve our services. Please take a few minutes to complete the written survey that you may receive in the mail after your visit with us. Thank you!             Your Updated Medication List - Protect others around you: Learn how to safely use, store and throw away your medicines at www.disposemymeds.org.          This list is accurate as of: 10/19/17  5:14 PM.  Always use your most recent med list.                   Brand Name Dispense Instructions for use Diagnosis    acyclovir 400 MG tablet    ZOVIRAX     Take 400 mg by mouth See Admin Instructions 5 times daily as needed for outbreaks        acyclovir 5 % ointment    ZOVIRAX    15 g    Apply topically 6 times daily    Recurrent cold sores       albuterol 108 (90 BASE) MCG/ACT Inhaler    PROAIR HFA/PROVENTIL HFA/VENTOLIN HFA    1 Inhaler    Inhale 2 puffs into the lungs every 6 hours    ILD (interstitial lung disease) (H)       alendronate 70 MG tablet    FOSAMAX    4 tablet    Take 1 tablet (70 mg) by mouth every 7 days    Other osteoporosis without current pathological fracture       atorvastatin 20 MG tablet    LIPITOR    90 tablet    Take 1 tablet (20 mg) by mouth daily    Hyperlipidemia LDL goal <100       azithromycin 250 MG tablet    ZITHROMAX    6 tablet    Two tablets first day, then one tablet daily for four days.    Acute recurrent maxillary sinusitis       * blood glucose monitoring lancets     4 Box    Use to test blood sugar 4 times daily or as directed.    Type 2 diabetes mellitus without complication, without long-term current use of insulin (H)       * blood glucose monitoring lancets     1 Box    Use to test blood sugar 4 times daily or as directed.  Ok to substitute alternative  if insurance prefers.    Type 2 diabetes mellitus without complication, without long-term current use of insulin (H)       * blood glucose monitoring test strip    no brand specified    300 strip    Use to test blood sugars 4 times daily or as directed    Type 2 diabetes mellitus without complication, without long-term current use of insulin (H)       * blood glucose monitoring test strip    ACCU-CHEK SHANNON PLUS    120 strip    Use to test blood sugar 4 times daily or as directed.  Ok to substitute alternative if insurance prefers.    Type 2 diabetes mellitus without complication, without long-term current use of insulin (H)       COMPRESSION STOCKINGS     2 each    Wear compression stockings in affected leg (right leg) or both legs most of the time during the day and take them off at night.    DVT (deep venous thrombosis), right, Postphlebitic syndrome, Chronic anticoagulation, Atrial fibrillation (H)       escitalopram 10 MG tablet    LEXAPRO    30 tablet    Take 1 tablet (10 mg) by mouth daily    Major depressive disorder, recurrent episode, moderate (H)       fluticasone 220 MCG/ACT Inhaler    FLOVENT HFA    3 Inhaler    Inhale 2 puffs into the lungs 2 times daily    ILD (interstitial lung disease) (H), Follicular bronchiolitis (H)       fluticasone 50 MCG/ACT spray    FLONASE    16 g    Spray 1-2 sprays into both nostrils daily    Seasonal allergic rhinitis       furosemide 40 MG tablet    LASIX    180 tablet    Take 1 tablet (40 mg) by mouth 2 times daily    (HFpEF) heart failure with preserved ejection fraction (H)       * insulin pen needle 31G X 8 MM    B-D U/F    400 each    Use 4 times daily (with Lantus and Novolog) or as directed.    Type 2 diabetes mellitus without complication, without long-term current use of insulin (H)       * insulin pen needle 32G X 4 MM    BD JANE U/F    200 each    Use 4 daily as directed.    Type 2 diabetes mellitus without complication, without long-term current use of  insulin (H)       ketoconazole 2 % cream    NIZORAL    60 g    Apply topically 2 times daily    Cutaneous candidiasis       LANTUS SOLOSTAR 100 UNIT/ML injection   Generic drug:  insulin glargine     15 mL    INJECT 25 UNTIS SUBCUTANEOUSLY EVERY DAY    Type 2 diabetes mellitus with hyperglycemia, with long-term current use of insulin (H)       lisinopril 2.5 MG tablet    PRINIVIL/Zestril    90 tablet    TAKE 1 TABLET (2.5 MG) BY MOUTH DAILY    Essential hypertension with goal blood pressure less than 140/90       metFORMIN 500 MG 24 hr tablet    GLUCOPHAGE-XR    180 tablet    TAKE 2 TABLETS (1,000 MG) BY MOUTH DAILY (WITH DINNER)    Type 2 diabetes mellitus without complication, without long-term current use of insulin (H)       metoprolol 100 MG 24 hr tablet    TOPROL-XL    90 tablet    Take 1 tablet (100 mg) by mouth daily    Atrial fibrillation with controlled ventricular response (H)       montelukast 10 MG tablet    SINGULAIR    90 tablet    Take 1 tablet (10 mg) by mouth At Bedtime    Sjogren's syndrome with lung involvement (H), ILD (interstitial lung disease) (H), Chronic seasonal allergic rhinitis due to other allergen       NovoLOG FLEXPEN 100 UNIT/ML injection   Generic drug:  insulin aspart     15 mL    INJECT 12 UNITS SUBCUTANEOUS 3 TIMES DAILY (WITH MEALS)    Type 2 diabetes mellitus with other specified complication (H)       order for DME     1 Device    Oxygen: Patient requires supplemental Oxygen 4 LPM via nasal canula with activity. Please provide patient with a home unit and with portability capability. Oxygen will be for a lifetime.    Hypoxia, ILD (interstitial lung disease) (H)       pantoprazole 20 MG EC tablet    PROTONIX    60 tablet    Take 1-2 tablets (20-40 mg) by mouth daily    Gastroesophageal reflux disease without esophagitis       PARoxetine 10 MG tablet    PAXIL    30 tablet    TAKE 1 TABLET (10MG) BY MOUTH AT BEDTIME    Major depressive disorder, recurrent episode, moderate (H)        polyethylene glycol Packet    MIRALAX/GLYCOLAX    7 packet    Take 17 g by mouth daily as needed for constipation    Constipation, unspecified constipation type       potassium chloride SA 20 MEQ CR tablet    K-DUR/KLOR-CON M    180 tablet    Take 2 tablets (40 mEq) by mouth daily    Acute on chronic heart failure, unspecified heart failure type (H), Acute and chronic respiratory failure with hypoxia (H), (HFpEF) heart failure with preserved ejection fraction (H)       predniSONE 10 MG tablet    DELTASONE    90 tablet    Take 2 tablets for three days (4/12-14), then take 1 tablet daily (10 mg daily starting 4/15)    ILD (interstitial lung disease) (H), Sjogren's syndrome (H)       spironolactone 25 MG tablet    ALDACTONE    180 tablet    Take 2 tablets (50 mg) by mouth daily    Chronic diastolic congestive heart failure (H)       traZODone 50 MG tablet    DESYREL    90 tablet    TAKE 1/2-1 TABLET BY MOUTH NIGHTLY AS NEEDED, CAN TITRATE DOWN TO 1/2TAB OR UP TO 2TABS AS NEEDED    Insomnia due to medical condition       TYLENOL 500 MG tablet   Generic drug:  acetaminophen      Take 500 mg by mouth nightly as needed    Dizziness       warfarin 7.5 MG tablet    COUMADIN    100 tablet    Current dose is 7.5 mg daily.  Dose adjusted per INR result.    Atrial fibrillation with controlled ventricular response (H)       * Notice:  This list has 6 medication(s) that are the same as other medications prescribed for you. Read the directions carefully, and ask your doctor or other care provider to review them with you.

## 2017-10-19 NOTE — NURSING NOTE
"Chief Complaint   Patient presents with     RECHECK     Follow up Sjogrens syndrome.       Initial /85  Pulse 98  Ht 1.702 m (5' 7\")  Wt 95.3 kg (210 lb 1.6 oz)  SpO2 92%  BMI 32.91 kg/m2 Estimated body mass index is 32.91 kg/(m^2) as calculated from the following:    Height as of this encounter: 1.702 m (5' 7\").    Weight as of this encounter: 95.3 kg (210 lb 1.6 oz).  Medication Reconciliation: complete   June Bradshaw.,CMA    "

## 2017-10-19 NOTE — LETTER
10/19/2017    RE: Betty Tee  [address]  [facility] [address]       Betty is a 77 year old female presents today for follow up on Sjogren's Syndrome with ILD.     #1 Sjogren syndrome with borderline positive lip biopsy, low titer RG and low titer SSA antibody, sicca symptoms, ILD with reticular opacities in lungs, paratrachial lymphadenopathy dx 2015  #2 Right submandibular gland swelling FNA negative for lymphoma  #3 Elevated IgG4  #4 Episodic prednisone responsive arthropathy   #5 Moderate osteopenia and chronic steroid use     Subjective:   Sister Sita reports feeling well today. She feels Sjogren symptoms have been stable for the past few months. Plan at last visit was to start to taper off the 10 mg of prednisone daily. She was able to taper down but started to get diffuse body aches at 7mg daily so she went back up to 10mg daily. Breathing has been stable over the past 6 months. Uses her oxygen a few times per week while going up stairs. Reports shortness of breath while using stairs or bending over but these are not new symptoms. Had recent PTFs that were stable. She recently got over head cold and feels it is coming back, feels congested with cough and sputum production.     DEXA scan in July showed worsening bone density from previous scan in 2015 and patient is motivated to try slower taper of prednisone. She is also agreeable to starting Fosamax. She had no other questions or concerns today.      HPI  Pt was diagnosed with Primary Sjogren Syndrome in 2015 due to borderline +RG, +SSA, and lip biopsy focus score of 1.  Her ILD and joint pain are prednisone-responsive which support the diagnosis. Ocular staining score was deferred given the other sources of diagnosis.      Past Medical History  Past Medical History:   Diagnosis Date     Alcohol abuse, in remission      Allergic rhinitis, cause unspecified     allegra helps when she takes it     Antiplatelet or antithrombotic  long-term use      Atrial fibrillation (H)     in hosp in 11/11 after surgery w/ fluid overload     Cardiomegaly     LVH on stress echo- [facility] scan for PE, neg stress echo in 8/06     Chest pain, unspecified      Disorder of bone and cartilage, unspecified     osteopenia (had been on prempro), improved on 6/06 dexa, stable dexa 11/10     Diverticulosis of colon (without mention of hemorrhage)     last episode yrs ago     Essential hypertension, benign      Gastro-oesophageal reflux disease      Insomnia, unspecified     weaned off clonazepam     Irregular heart beat      Lumbago 7/09    MRI with [facility], now seeing Dr. Cain for sciatic sx's     Major depressive disorder, recurrent episode, moderate (H)      Obstructive sleep apnea      Osteoarthrosis, unspecified whether generalized or localized, unspecified site      Sjogren's syndrome (H)      Sleep apnea      Tobacco use disorder     chantix in 9/07, started again in 6/08, working       Allergies  Allergies   Allergen Reactions     Augmentin Nausea and Vomiting     Codeine Nausea and Vomiting     Phenobarbital Itching     Medications  Current Outpatient Prescriptions   Medication Sig Dispense Refill     alendronate (FOSAMAX) 70 MG tablet Take 1 tablet (70 mg) by mouth every 7 days 4 tablet 11     lisinopril (PRINIVIL/ZESTRIL) 2.5 MG tablet TAKE 1 TABLET (2.5 MG) BY MOUTH DAILY 90 tablet 0     LANTUS SOLOSTAR 100 UNIT/ML soln INJECT 25 UNTIS SUBCUTANEOUSLY EVERY DAY 15 mL 0     atorvastatin (LIPITOR) 20 MG tablet Take 1 tablet (20 mg) by mouth daily 90 tablet 0     potassium chloride SA (K-DUR/KLOR-CON M) 20 MEQ CR tablet Take 2 tablets (40 mEq) by mouth daily 180 tablet 3     escitalopram (LEXAPRO) 10 MG tablet Take 1 tablet (10 mg) by mouth daily 30 tablet 1     azithromycin (ZITHROMAX) 250 MG tablet Two tablets first day, then one tablet daily for four days. 6 tablet 0     metFORMIN (GLUCOPHAGE-XR) 500 MG 24 hr tablet TAKE 2 TABLETS  (1,000 MG) BY MOUTH DAILY (WITH DINNER) 180 tablet 0     furosemide (LASIX) 40 MG tablet Take 1 tablet (40 mg) by mouth 2 times daily 180 tablet 3     NOVOLOG FLEXPEN 100 UNIT/ML soln INJECT 12 UNITS SUBCUTANEOUS 3 TIMES DAILY (WITH MEALS) 15 mL 1     montelukast (SINGULAIR) 10 MG tablet Take 1 tablet (10 mg) by mouth At Bedtime 90 tablet 1     warfarin (COUMADIN) 7.5 MG tablet Current dose is 7.5 mg daily.  Dose adjusted per INR result. 100 tablet 0     spironolactone (ALDACTONE) 25 MG tablet Take 2 tablets (50 mg) by mouth daily 180 tablet 3     traZODone (DESYREL) 50 MG tablet TAKE 1/2-1 TABLET BY MOUTH NIGHTLY AS NEEDED, CAN TITRATE DOWN TO 1/2TAB OR UP TO 2TABS AS NEEDED 90 tablet 1     blood glucose monitoring (NO BRAND SPECIFIED) test strip Use to test blood sugars 4 times daily or as directed 300 strip 3     blood glucose monitoring (ACCU-CHEK MULTICLIX) lancets Use to test blood sugar 4 times daily or as directed. 4 Box 3     insulin pen needle (B-D U/F) 31G X 8 MM Use 4 times daily (with Lantus and Novolog) or as directed. 400 each 3     insulin pen needle (BD JANE U/F) 32G X 4 MM Use 4 daily as directed. 200 each 11     blood glucose monitoring (ACCU-CHEK SHANNON PLUS) test strip Use to test blood sugar 4 times daily or as directed.  Ok to substitute alternative if insurance prefers. 120 strip 11     blood glucose monitoring (ACCU-CHEK FASTCLIX) lancets Use to test blood sugar 4 times daily or as directed.  Ok to substitute alternative if insurance prefers. 1 Box 11     predniSONE (DELTASONE) 10 MG tablet Take 2 tablets for three days (4/12-14), then take 1 tablet daily (10 mg daily starting 4/15) 90 tablet 3     pantoprazole (PROTONIX) 20 MG EC tablet Take 1-2 tablets (20-40 mg) by mouth daily 60 tablet 1     ketoconazole (NIZORAL) 2 % cream Apply topically 2 times daily 60 g 1     metoprolol (TOPROL-XL) 100 MG 24 hr tablet Take 1 tablet (100 mg) by mouth daily 90 tablet 3     albuterol (PROAIR HFA,  PROVENTIL HFA, VENTOLIN HFA) 108 (90 BASE) MCG/ACT inhaler Inhale 2 puffs into the lungs every 6 hours 1 Inhaler 3     order for DME Oxygen: Patient requires supplemental Oxygen 4 LPM via nasal canula with activity. Please provide patient with a home unit and with portability capability. Oxygen will be for a lifetime. 1 Device 0     polyethylene glycol (MIRALAX/GLYCOLAX) packet Take 17 g by mouth daily as needed for constipation 7 packet 0     acyclovir (ZOVIRAX) 5 % ointment Apply topically 6 times daily (Patient taking differently: Apply topically 6 times daily As needed for outbreaks) 15 g 3     fluticasone (FLOVENT HFA) 220 MCG/ACT inhaler Inhale 2 puffs into the lungs 2 times daily 3 Inhaler 3     acyclovir (ZOVIRAX) 400 MG tablet Take 400 mg by mouth See Admin Instructions 5 times daily as needed for outbreaks       fluticasone (FLONASE) 50 MCG/ACT nasal spray Spray 1-2 sprays into both nostrils daily (Patient taking differently: Spray 1-2 sprays into both nostrils daily as needed for allergies ) 16 g 5     COMPRESSION STOCKINGS Wear compression stockings in affected leg (right leg) or both legs most of the time during the day and take them off at night. 2 each 2     acetaminophen (TYLENOL) 500 MG tablet Take 500 mg by mouth nightly as needed        PARoxetine (PAXIL) 10 MG tablet TAKE 1 TABLET (10MG) BY MOUTH AT BEDTIME (Patient not taking: Reported on 10/19/2017) 30 tablet 1     Family History  family history includes Alcohol/Drug in her father; Alzheimer Disease in her father; Asthma in her sister; Breast Cancer in her daughter; C.A.D. (age of onset: 52) in her sister; C.A.D. (age of onset: 63) in her mother; CANCER in her sister and sister; CEREBROVASCULAR DISEASE in her mother; Cancer - colorectal (age of onset: 48) in her sister; DIABETES in her sister; Dementia in her father; GASTROINTESTINAL DISEASE in her brother and sister; HEART DISEASE in her mother, sister, and sister; Hyperlipidemia in her  "brother, father, and mother; Hypertension in her brother, father, mother, sister, and sister; Lipids in her sister and sister; Parkinsonism in her brother; Prostate Cancer in her brother; Prostate Cancer (age of onset: 74) in her brother; Substance Abuse in her brother and sister.  Social History  Social History   Substance Use Topics     Smoking status: Former Smoker     Packs/day: 0.50     Years: 10.00     Types: Cigarettes     Quit date: 8/1/2011     Smokeless tobacco: Never Used      Comment: 1/2 ppd     Alcohol use No      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       ROS  Skin: negative  Eyes: negative  Ears/Nose/Throat: negative  Respiratory: Current upper respiratory symptoms with cough and sputum production.  Uses supplemental oxygen occasionally when going up stairs.   Endocrine: negative  Cardiovascular: well controlled a fib and chf   Gastrointestinal: negative  Genitourinary: negative  Musculoskeletal: negative  Neurologic: negative  Psychiatric: negative  Rheumatology: Denies skin changes, joint pain or swelling, or morning stiffness.       Physical Exam   /85  Pulse 98  Ht 1.702 m (5' 7\")  Wt 95.3 kg (210 lb 1.6 oz)  SpO2 92%  BMI 32.91 kg/m2  General: No acute distress. Pleasant and conversational. Accompanied by friend today.   HEENT: No oral ulcers, thrush. Normal salivary pool and dentition. Conj not injected, sclear anicteric   CARD: Irregularly irregular rhythm, normal rate. No murmur, gallop, or rub. No cyanosis or clubbing of extremities.   PULM: Non labored breathing on room air. Lungs clear bilaterally. No wheezes or crackles.   ABD: soft, nontender, nondistended.   SKIN: Dry skin on lower legs. Small excoriation on back of right calf. No rashes or concerning lesions.   MUSC: no swollen, erythematous or warm joints, joints are non tender   NEURO: Alert and oriented x3, fluent speech, grossly non focal   Psych: Normal Affect     No new labs.     ASSESSMENT and PLAN    1. Sjogren's " Syndrome with ILD: Patient is doing well with stable symptoms. Dry eyes and mouth are well managed. No recent infections or changes in health. Stable PFTs. Patient did not tolerate previous steroid taper, however given worsening bone density on DEXA scan, we will try to taper but at a slower pace. She tolerated taper of 10mg down to 7mg daily but then experienced muscle aches.   - Once over current viral URI and feeling well, started to taper prednisone   - Take 9mg daily for 2-3 weeks. If feeling well, decreased further to 8mg daily and stay on 8mg daily until next visit.     2. High risk of osteoporosis in a setting of long-term prednisone use, no hx of fracture. Dexa in 2015 wnl but most recent DEXA showed T score of -1.7. Patient is also agreeable to starting Fosamax for bone health. Risks and benefits of Fosamax were discussed and informational packet was provided.   - Fosamax 70mg once per week   - Check kidney function every 6 months.     RTC in 3 months    Pt seen and examined with Dr. Stringer. I acted as a scribe in the care of this patient.   Shiva David, MS4        I reviewed and addended the scribed note to reflect my findings and plan.     I discussed the findings and recommendations with the patient.  Recommendations were discussed with the consulting team.  Time spent: 30 minutes    Carmenza Stringer MD, MS  Rheumatology Attending Physician  pgr [phone]

## 2017-10-19 NOTE — PATIENT INSTRUCTIONS
Start to taper your prednisone dose after you recover from a cold in about 2 weeks: take 9 mg daily for 2-3 weeks. If you feel ok decrease further to 8 mg per day, and stay on 8 mg until next visit.     Start taking fosamax for bone health: 70 mg once a week.

## 2017-10-19 NOTE — PROGRESS NOTES
Betty is a 77 year old female presents today for follow up on Sjogren's Syndrome with ILD.     #1 Sjogren syndrome with borderline positive lip biopsy, low titer RG and low titer SSA antibody, sicca symptoms, ILD with reticular opacities in lungs, paratrachial lymphadenopathy dx 2015  #2 Right submandibular gland swelling FNA negative for lymphoma  #3 Elevated IgG4  #4 Episodic prednisone responsive arthropathy   #5 Moderate osteopenia and chronic steroid use     Subjective:   Sister Sita reports feeling well today. She feels Sjogren symptoms have been stable for the past few months. Plan at last visit was to start to taper off the 10 mg of prednisone daily. She was able to taper down but started to get diffuse body aches at 7mg daily so she went back up to 10mg daily. Breathing has been stable over the past 6 months. Uses her oxygen a few times per week while going up stairs. Reports shortness of breath while using stairs or bending over but these are not new symptoms. Had recent PTFs that were stable. She recently got over head cold and feels it is coming back, feels congested with cough and sputum production.     DEXA scan in July showed worsening bone density from previous scan in 2015 and patient is motivated to try slower taper of prednisone. She is also agreeable to starting Fosamax. She had no other questions or concerns today.      HPI  Pt was diagnosed with Primary Sjogren Syndrome in 2015 due to borderline +RG, +SSA, and lip biopsy focus score of 1.  Her ILD and joint pain are prednisone-responsive which support the diagnosis. Ocular staining score was deferred given the other sources of diagnosis.      Past Medical History  Past Medical History:   Diagnosis Date     Alcohol abuse, in remission      Allergic rhinitis, cause unspecified     allegra helps when she takes it     Antiplatelet or antithrombotic long-term use      Atrial fibrillation (H)     in hosp in 11/11 after surgery w/ fluid  overload     Cardiomegaly     LVH on stress echo- cardiac [facility]/u [facility]- neg CT scan for PE, neg stress echo in 8/06     Chest pain, unspecified      Disorder of bone and cartilage, unspecified     osteopenia (had been on prempro), improved on 6/06 dexa, stable dexa 11/10     Diverticulosis of colon (without mention of hemorrhage)     last episode yrs ago     Essential hypertension, benign      Gastro-oesophageal reflux disease      Insomnia, unspecified     weaned off clonazepam     Irregular heart beat      Lumbago 7/09    MRI with [facility], now seeing Dr. Cain for sciatic sx's     Major depressive disorder, recurrent episode, moderate (H)      Obstructive sleep apnea      Osteoarthrosis, unspecified whether generalized or localized, unspecified site      Sjogren's syndrome (H)      Sleep apnea      Tobacco use disorder     chantix in 9/07, started again in 6/08, working       Allergies  Allergies   Allergen Reactions     Augmentin Nausea and Vomiting     Codeine Nausea and Vomiting     Phenobarbital Itching     Medications  Current Outpatient Prescriptions   Medication Sig Dispense Refill     alendronate (FOSAMAX) 70 MG tablet Take 1 tablet (70 mg) by mouth every 7 days 4 tablet 11     lisinopril (PRINIVIL/ZESTRIL) 2.5 MG tablet TAKE 1 TABLET (2.5 MG) BY MOUTH DAILY 90 tablet 0     LANTUS SOLOSTAR 100 UNIT/ML soln INJECT 25 UNTIS SUBCUTANEOUSLY EVERY DAY 15 mL 0     atorvastatin (LIPITOR) 20 MG tablet Take 1 tablet (20 mg) by mouth daily 90 tablet 0     potassium chloride SA (K-DUR/KLOR-CON M) 20 MEQ CR tablet Take 2 tablets (40 mEq) by mouth daily 180 tablet 3     escitalopram (LEXAPRO) 10 MG tablet Take 1 tablet (10 mg) by mouth daily 30 tablet 1     azithromycin (ZITHROMAX) 250 MG tablet Two tablets first day, then one tablet daily for four days. 6 tablet 0     metFORMIN (GLUCOPHAGE-XR) 500 MG 24 hr tablet TAKE 2 TABLETS (1,000 MG) BY MOUTH DAILY (WITH DINNER) 180 tablet 0     furosemide (LASIX) 40 MG tablet  Take 1 tablet (40 mg) by mouth 2 times daily 180 tablet 3     NOVOLOG FLEXPEN 100 UNIT/ML soln INJECT 12 UNITS SUBCUTANEOUS 3 TIMES DAILY (WITH MEALS) 15 mL 1     montelukast (SINGULAIR) 10 MG tablet Take 1 tablet (10 mg) by mouth At Bedtime 90 tablet 1     warfarin (COUMADIN) 7.5 MG tablet Current dose is 7.5 mg daily.  Dose adjusted per INR result. 100 tablet 0     spironolactone (ALDACTONE) 25 MG tablet Take 2 tablets (50 mg) by mouth daily 180 tablet 3     traZODone (DESYREL) 50 MG tablet TAKE 1/2-1 TABLET BY MOUTH NIGHTLY AS NEEDED, CAN TITRATE DOWN TO 1/2TAB OR UP TO 2TABS AS NEEDED 90 tablet 1     blood glucose monitoring (NO BRAND SPECIFIED) test strip Use to test blood sugars 4 times daily or as directed 300 strip 3     blood glucose monitoring (ACCU-CHEK MULTICLIX) lancets Use to test blood sugar 4 times daily or as directed. 4 Box 3     insulin pen needle (B-D U/F) 31G X 8 MM Use 4 times daily (with Lantus and Novolog) or as directed. 400 each 3     insulin pen needle (BD JANE U/F) 32G X 4 MM Use 4 daily as directed. 200 each 11     blood glucose monitoring (ACCU-CHEK SHANNON PLUS) test strip Use to test blood sugar 4 times daily or as directed.  Ok to substitute alternative if insurance prefers. 120 strip 11     blood glucose monitoring (ACCU-CHEK FASTCLIX) lancets Use to test blood sugar 4 times daily or as directed.  Ok to substitute alternative if insurance prefers. 1 Box 11     predniSONE (DELTASONE) 10 MG tablet Take 2 tablets for three days (4/12-14), then take 1 tablet daily (10 mg daily starting 4/15) 90 tablet 3     pantoprazole (PROTONIX) 20 MG EC tablet Take 1-2 tablets (20-40 mg) by mouth daily 60 tablet 1     ketoconazole (NIZORAL) 2 % cream Apply topically 2 times daily 60 g 1     metoprolol (TOPROL-XL) 100 MG 24 hr tablet Take 1 tablet (100 mg) by mouth daily 90 tablet 3     albuterol (PROAIR HFA, PROVENTIL HFA, VENTOLIN HFA) 108 (90 BASE) MCG/ACT inhaler Inhale 2 puffs into the lungs every  6 hours 1 Inhaler 3     order for DME Oxygen: Patient requires supplemental Oxygen 4 LPM via nasal canula with activity. Please provide patient with a home unit and with portability capability. Oxygen will be for a lifetime. 1 Device 0     polyethylene glycol (MIRALAX/GLYCOLAX) packet Take 17 g by mouth daily as needed for constipation 7 packet 0     acyclovir (ZOVIRAX) 5 % ointment Apply topically 6 times daily (Patient taking differently: Apply topically 6 times daily As needed for outbreaks) 15 g 3     fluticasone (FLOVENT HFA) 220 MCG/ACT inhaler Inhale 2 puffs into the lungs 2 times daily 3 Inhaler 3     acyclovir (ZOVIRAX) 400 MG tablet Take 400 mg by mouth See Admin Instructions 5 times daily as needed for outbreaks       fluticasone (FLONASE) 50 MCG/ACT nasal spray Spray 1-2 sprays into both nostrils daily (Patient taking differently: Spray 1-2 sprays into both nostrils daily as needed for allergies ) 16 g 5     COMPRESSION STOCKINGS Wear compression stockings in affected leg (right leg) or both legs most of the time during the day and take them off at night. 2 each 2     acetaminophen (TYLENOL) 500 MG tablet Take 500 mg by mouth nightly as needed        PARoxetine (PAXIL) 10 MG tablet TAKE 1 TABLET (10MG) BY MOUTH AT BEDTIME (Patient not taking: Reported on 10/19/2017) 30 tablet 1     Family History  family history includes Alcohol/Drug in her father; Alzheimer Disease in her father; Asthma in her sister; Breast Cancer in her daughter; C.A.D. (age of onset: 52) in her sister; C.A.D. (age of onset: 63) in her mother; CANCER in her sister and sister; CEREBROVASCULAR DISEASE in her mother; Cancer - colorectal (age of onset: 48) in her sister; DIABETES in her sister; Dementia in her father; GASTROINTESTINAL DISEASE in her brother and sister; HEART DISEASE in her mother, sister, and sister; Hyperlipidemia in her brother, father, and mother; Hypertension in her brother, father, mother, sister, and sister; Lipids  "in her sister and sister; Parkinsonism in her brother; Prostate Cancer in her brother; Prostate Cancer (age of onset: 74) in her brother; Substance Abuse in her brother and sister.  Social History  Social History   Substance Use Topics     Smoking status: Former Smoker     Packs/day: 0.50     Years: 10.00     Types: Cigarettes     Quit date: 8/1/2011     Smokeless tobacco: Never Used      Comment: 1/2 ppd     Alcohol use No      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       ROS  Skin: negative  Eyes: negative  Ears/Nose/Throat: negative  Respiratory: Current upper respiratory symptoms with cough and sputum production.  Uses supplemental oxygen occasionally when going up stairs.   Endocrine: negative  Cardiovascular: well controlled a fib and chf   Gastrointestinal: negative  Genitourinary: negative  Musculoskeletal: negative  Neurologic: negative  Psychiatric: negative  Rheumatology: Denies skin changes, joint pain or swelling, or morning stiffness.       Physical Exam   /85  Pulse 98  Ht 1.702 m (5' 7\")  Wt 95.3 kg (210 lb 1.6 oz)  SpO2 92%  BMI 32.91 kg/m2  General: No acute distress. Pleasant and conversational. Accompanied by friend today.   HEENT: No oral ulcers, thrush. Normal salivary pool and dentition. Conj not injected, sclear anicteric   CARD: Irregularly irregular rhythm, normal rate. No murmur, gallop, or rub. No cyanosis or clubbing of extremities.   PULM: Non labored breathing on room air. Lungs clear bilaterally. No wheezes or crackles.   ABD: soft, nontender, nondistended.   SKIN: Dry skin on lower legs. Small excoriation on back of right calf. No rashes or concerning lesions.   MUSC: no swollen, erythematous or warm joints, joints are non tender   NEURO: Alert and oriented x3, fluent speech, grossly non focal   Psych: Normal Affect     No new labs.     ASSESSMENT and PLAN    1. Sjogren's Syndrome with ILD: Patient is doing well with stable symptoms. Dry eyes and mouth are well managed. No " recent infections or changes in health. Stable PFTs. Patient did not tolerate previous steroid taper, however given worsening bone density on DEXA scan, we will try to taper but at a slower pace. She tolerated taper of 10mg down to 7mg daily but then experienced muscle aches.   - Once over current viral URI and feeling well, started to taper prednisone   - Take 9mg daily for 2-3 weeks. If feeling well, decreased further to 8mg daily and stay on 8mg daily until next visit.     2. High risk of osteoporosis in a setting of long-term prednisone use, no hx of fracture. Dexa in 2015 wnl but most recent DEXA showed T score of -1.7. Patient is also agreeable to starting Fosamax for bone health. Risks and benefits of Fosamax were discussed and informational packet was provided.   - Fosamax 70mg once per week   - Check kidney function every 6 months.     RTC in 3 months    Pt seen and examined with Dr. Stringer. I acted as a scribe in the care of this patient.   Shiva David, MS4        I reviewed and addended the scribed note to reflect my findings and plan.     I discussed the findings and recommendations with the patient.  Recommendations were discussed with the consulting team.  Time spent: 30 minutes    Carmenza Stringer MD, MS  Rheumatology Attending Physician  pgr [phone]

## 2017-10-23 ENCOUNTER — TELEPHONE (OUTPATIENT)
Dept: PULMONOLOGY | Facility: CLINIC | Age: 77
End: 2017-10-23

## 2017-10-23 DIAGNOSIS — J84.9 ILD (INTERSTITIAL LUNG DISEASE) (H): Primary | ICD-10-CM

## 2017-10-23 RX ORDER — AZITHROMYCIN 250 MG/1
TABLET, FILM COATED ORAL
Qty: 6 TABLET | Refills: 0 | Status: SHIPPED | OUTPATIENT
Start: 2017-10-23 | End: 2018-01-31

## 2017-10-23 NOTE — TELEPHONE ENCOUNTER
----- Message from Daniela Foster sent at 10/23/2017 11:32 AM CDT -----  Regarding: Pt calling to provide medical update  Contact: [phone]  Pt calling to provide medical update to Dr. Walsh.  Pt had last seen Dr. Walsh 10/04/17, was provided some antibiotics, was told to call in to provide update with cold.  Pt states that she had a head cold but thinks it has traveled her her lungs.  Pt can be reached with the number listed above.    Thank you,  Daniela JONES  Call Center    Please DO NOT send this message and/or reply back to sender.  Call Center Representatives DO NOT respond to messages.

## 2017-10-23 NOTE — TELEPHONE ENCOUNTER
"Contacted patient regarding note below. Patient said that she started feeling sick on Friday, head congestion and lots of drainage, denies fevers, had night sweats on Saturday night, feels like it is moving down into her lungs, coughed up a small amount of dark blood, stated \"think it was from my nose\". Discussed with Dr Walsh, wants to treat her with a Zpak. Informed patient, agreed with plan.   "

## 2017-10-24 ENCOUNTER — PRE VISIT (OUTPATIENT)
Dept: CARDIOLOGY | Facility: CLINIC | Age: 77
End: 2017-10-24

## 2017-10-26 ENCOUNTER — OFFICE VISIT (OUTPATIENT)
Dept: CARDIOLOGY | Facility: CLINIC | Age: 77
End: 2017-10-26
Attending: INTERNAL MEDICINE
Payer: MEDICARE

## 2017-10-26 VITALS
OXYGEN SATURATION: 96 % | SYSTOLIC BLOOD PRESSURE: 113 MMHG | DIASTOLIC BLOOD PRESSURE: 76 MMHG | HEIGHT: 67 IN | BODY MASS INDEX: 33.01 KG/M2 | WEIGHT: 210.3 LBS | HEART RATE: 84 BPM

## 2017-10-26 DIAGNOSIS — I50.30 (HFPEF) HEART FAILURE WITH PRESERVED EJECTION FRACTION (H): ICD-10-CM

## 2017-10-26 DIAGNOSIS — I48.21 PERMANENT ATRIAL FIBRILLATION (H): Primary | ICD-10-CM

## 2017-10-26 LAB
ANION GAP SERPL CALCULATED.3IONS-SCNC: 7 MMOL/L (ref 3–14)
BUN SERPL-MCNC: 20 MG/DL (ref 7–30)
CALCIUM SERPL-MCNC: 8.8 MG/DL (ref 8.5–10.1)
CHLORIDE SERPL-SCNC: 100 MMOL/L (ref 94–109)
CO2 SERPL-SCNC: 29 MMOL/L (ref 20–32)
CREAT SERPL-MCNC: 0.89 MG/DL (ref 0.52–1.04)
GFR SERPL CREATININE-BSD FRML MDRD: 62 ML/MIN/1.7M2
GLUCOSE SERPL-MCNC: 158 MG/DL (ref 70–99)
INR PPP: 3.67 (ref 0.86–1.14)
POTASSIUM SERPL-SCNC: 3.6 MMOL/L (ref 3.4–5.3)
SODIUM SERPL-SCNC: 136 MMOL/L (ref 133–144)

## 2017-10-26 PROCEDURE — 99212 OFFICE O/P EST SF 10 MIN: CPT | Mod: ZF

## 2017-10-26 PROCEDURE — 99214 OFFICE O/P EST MOD 30 MIN: CPT | Mod: ZP | Performed by: INTERNAL MEDICINE

## 2017-10-26 PROCEDURE — 80048 BASIC METABOLIC PNL TOTAL CA: CPT | Performed by: INTERNAL MEDICINE

## 2017-10-26 PROCEDURE — 36415 COLL VENOUS BLD VENIPUNCTURE: CPT | Performed by: INTERNAL MEDICINE

## 2017-10-26 PROCEDURE — 85610 PROTHROMBIN TIME: CPT | Performed by: INTERNAL MEDICINE

## 2017-10-26 ASSESSMENT — PAIN SCALES - GENERAL: PAINLEVEL: NO PAIN (0)

## 2017-10-26 NOTE — PATIENT INSTRUCTIONS
Call Dr. Walsh if your breathing is not improved by next week.     Dr. Rosa can see you back in 6 months.     No medication changes.     .

## 2017-10-26 NOTE — NURSING NOTE
Chief Complaint   Patient presents with     Follow Up For     4 mo. follow up for HFpEF     Vitals were taken and medications were reconciled.  CHARLES Perez  3:14 PM

## 2017-10-26 NOTE — MR AVS SNAPSHOT
After Visit Summary   10/26/2017    Betty Tee    MRN: [id number]           Patient Information     Date Of Birth          1940        Visit Information        Provider Department      10/26/2017 3:00 PM Radha Rosa MD [facility]        Today's Diagnoses     Atrial fibrillation (H)    -  1      Care Instructions    Call Dr. Walsh if your breathing is not improved by next week.     Dr. Rosa can see you back in 6 months.     No medication changes.     .                  Follow-ups after your visit        Follow-up notes from your care team     Return in about 6 months (around 4/26/2018).      Your next 10 appointments already scheduled     Nov 08, 2017  1:00 PM CST   Office Visit with Ilene Tristan MD   [facility] ([address] [id number] [phone]           Bring a current list of meds and any records pertaining to this visit. For Physicals, please bring immunization records and any forms needing to be filled out. Please arrive 10 minutes early to complete paperwork.            Nov 15, 2017  3:30 PM CST   FULL PULMONARY FUNCTION with  PFL D   [facility] Pulmonary Function Testing ([facility])    [address] [place] [id number] [phone]            Nov 15, 2017  4:30 PM CST   (Arrive by 4:15 PM)   Return Interstitial Lung with Meño Walsh MD   [facility] ([facility])    [address] [id number] [phone]            Jan 18, 2018  1:00 PM CST   (Arrive by 12:45 PM)   Return Visit with Carmenza Stringer MD   [facility] Rheumatology ([facility])    [address] [id number] [phone]            Apr 26, 2018  3:00 PM CDT   Lab with  LAB   [facility] ([facility]    [address] [facility] [address] [facility]  [address] [facility] [id number] [phone]            Apr 26, 2018  3:30 PM CDT   (Arrive by 3:15 PM)   RETURN HEART FAILURE with Radha Rosa MD   [facility] ([facility])    [address] [facility] [address]  [address] [facility] [id number] [phone]              Who to contact     If you have questions or need follow up information about today's clinic visit or your schedule please contact [facility] directly at [phone].  Normal or non-critical lab and imaging results will be communicated to you by TalkApolishart, letter or phone within 4 business days after the clinic has received the results. If you do not hear from us within 7 days, please contact the clinic through Directa Plust or phone. If you have a critical or abnormal lab result, we will notify you by phone as soon as possible.  Submit refill requests through MoSo or call your pharmacy and they will forward the refill request to us. Please allow 3 business days for your refill to be completed.          Additional Information About Your Visit        TalkApolishart Information     MoSo gives you secure access to your electronic health record. If you see a primary care provider, you can also send messages to your care team and make appointments. If you have questions, please call your primary care clinic.  If you do not have a primary care provider, please call [phone] and they will assist you.        Care EveryWhere ID     This is your Care EveryWhere ID. This could be used by other organizations to access your [place] medical records  [id number]        Your Vitals Were     Pulse Height Pulse Oximetry BMI (Body Mass Index)          84 1.702 m (5' 7\") 96% 32.94 kg/m2         Blood Pressure from Last 3 Encounters:   10/26/17 113/76   10/19/17 139/85   10/06/17 110/60    Weight from Last 3 Encounters:   10/26/17 95.4 kg (210 lb 4.8 oz)   10/19/17 95.3 kg (210 " lb 1.6 oz)   10/06/17 93.4 kg (206 lb)              We Performed the Following     Basic metabolic panel     INR          Today's Medication Changes          These changes are accurate as of: 10/26/17  4:03 PM.  If you have any questions, ask your nurse or doctor.               These medicines have changed or have updated prescriptions.        Dose/Directions    acyclovir 5 % ointment   Commonly known as:  ZOVIRAX   This may have changed:  additional instructions   Used for:  Recurrent cold sores        Apply topically 6 times daily   Quantity:  15 g   Refills:  3       fluticasone 50 MCG/ACT spray   Commonly known as:  FLONASE   This may have changed:    - when to take this  - reasons to take this   Used for:  Seasonal allergic rhinitis        Dose:  1-2 spray   Spray 1-2 sprays into both nostrils daily   Quantity:  16 g   Refills:  5                Primary Care Provider Office Phone # Fax #    Ilene Tristan [phone] [address] [facility]  [address]  [facility] [address]        Equal Access to Services     [facility] CenterSRIDEVI AH: Hadii aad ku hadasho Soomaali, waaxda luqadaha, qaybta kaalmada adeegyada, waxay idiin haydeidran claudio mercedes . So [facility] [phone].    ATENCIÓN: Si habla español, tiene a conti disposición servicios gratuitos de asistencia lingüística. [facility] [phone].    We comply with applicable federal civil rights laws and [place] laws. We do not discriminate on the basis of race, color, national origin, age, disability, sex, sexual orientation, or gender identity.            Thank you!     Thank you for choosing [facility]  for your care. Our goal is always to provide you with excellent care. Hearing back from our patients is one way we can continue to improve our services. Please take a few minutes to complete the written survey that you may receive in the mail after your visit with us. Thank you!             Your Updated Medication List - Protect others around  you: Learn how to safely use, store and throw away your medicines at www.disposemymeds.org.          This list is accurate as of: 10/26/17  4:03 PM.  Always use your most recent med list.                   Brand Name Dispense Instructions for use Diagnosis    acyclovir 400 MG tablet    ZOVIRAX     Take 400 mg by mouth See Admin Instructions 5 times daily as needed for outbreaks        acyclovir 5 % ointment    ZOVIRAX    15 g    Apply topically 6 times daily    Recurrent cold sores       albuterol 108 (90 BASE) MCG/ACT Inhaler    PROAIR HFA/PROVENTIL HFA/VENTOLIN HFA    1 Inhaler    Inhale 2 puffs into the lungs every 6 hours    ILD (interstitial lung disease) (H)       alendronate 70 MG tablet    FOSAMAX    4 tablet    Take 1 tablet (70 mg) by mouth every 7 days    Other osteoporosis without current pathological fracture       atorvastatin 20 MG tablet    LIPITOR    90 tablet    Take 1 tablet (20 mg) by mouth daily    Hyperlipidemia LDL goal <100       * azithromycin 250 MG tablet    ZITHROMAX    6 tablet    Two tablets first day, then one tablet daily for four days.    Acute recurrent maxillary sinusitis       * azithromycin 250 MG tablet    ZITHROMAX    6 tablet    Take 2 tablets (500 mg) the first day, then take 1 tablet (250 mg) by mouth daily for a total of 5 days.    ILD (interstitial lung disease) (H)       * blood glucose monitoring lancets     4 Box    Use to test blood sugar 4 times daily or as directed.    Type 2 diabetes mellitus without complication, without long-term current use of insulin (H)       * blood glucose monitoring lancets     1 Box    Use to test blood sugar 4 times daily or as directed.  Ok to substitute alternative if insurance prefers.    Type 2 diabetes mellitus without complication, without long-term current use of insulin (H)       * blood glucose monitoring test strip    no brand specified    300 strip    Use to test blood sugars 4 times daily or as directed    Type 2 diabetes  mellitus without complication, without long-term current use of insulin (H)       * blood glucose monitoring test strip    ACCU-CHEK SHANNON PLUS    120 strip    Use to test blood sugar 4 times daily or as directed.  Ok to substitute alternative if insurance prefers.    Type 2 diabetes mellitus without complication, without long-term current use of insulin (H)       COMPRESSION STOCKINGS     2 each    Wear compression stockings in affected leg (right leg) or both legs most of the time during the day and take them off at night.    DVT (deep venous thrombosis), right, Postphlebitic syndrome, Chronic anticoagulation, Atrial fibrillation (H)       escitalopram 10 MG tablet    LEXAPRO    30 tablet    Take 1 tablet (10 mg) by mouth daily    Major depressive disorder, recurrent episode, moderate (H)       fluticasone 220 MCG/ACT Inhaler    FLOVENT HFA    3 Inhaler    Inhale 2 puffs into the lungs 2 times daily    ILD (interstitial lung disease) (H), Follicular bronchiolitis (H)       fluticasone 50 MCG/ACT spray    FLONASE    16 g    Spray 1-2 sprays into both nostrils daily    Seasonal allergic rhinitis       furosemide 40 MG tablet    LASIX    180 tablet    Take 1 tablet (40 mg) by mouth 2 times daily    (HFpEF) heart failure with preserved ejection fraction (H)       * insulin pen needle 31G X 8 MM    B-D U/F    400 each    Use 4 times daily (with Lantus and Novolog) or as directed.    Type 2 diabetes mellitus without complication, without long-term current use of insulin (H)       * insulin pen needle 32G X 4 MM    BD JANE U/F    200 each    Use 4 daily as directed.    Type 2 diabetes mellitus without complication, without long-term current use of insulin (H)       ketoconazole 2 % cream    NIZORAL    60 g    Apply topically 2 times daily    Cutaneous candidiasis       LANTUS SOLOSTAR 100 UNIT/ML injection   Generic drug:  insulin glargine     15 mL    INJECT 25 UNTIS SUBCUTANEOUSLY EVERY DAY    Type 2 diabetes mellitus  with hyperglycemia, with long-term current use of insulin (H)       lisinopril 2.5 MG tablet    PRINIVIL/Zestril    90 tablet    TAKE 1 TABLET (2.5 MG) BY MOUTH DAILY    Essential hypertension with goal blood pressure less than 140/90       metFORMIN 500 MG 24 hr tablet    GLUCOPHAGE-XR    180 tablet    TAKE 2 TABLETS (1,000 MG) BY MOUTH DAILY (WITH DINNER)    Type 2 diabetes mellitus without complication, without long-term current use of insulin (H)       metoprolol 100 MG 24 hr tablet    TOPROL-XL    90 tablet    Take 1 tablet (100 mg) by mouth daily    Atrial fibrillation with controlled ventricular response (H)       montelukast 10 MG tablet    SINGULAIR    90 tablet    Take 1 tablet (10 mg) by mouth At Bedtime    Sjogren's syndrome with lung involvement (H), ILD (interstitial lung disease) (H), Chronic seasonal allergic rhinitis due to other allergen       NovoLOG FLEXPEN 100 UNIT/ML injection   Generic drug:  insulin aspart     15 mL    INJECT 12 UNITS SUBCUTANEOUS 3 TIMES DAILY (WITH MEALS)    Type 2 diabetes mellitus with other specified complication (H)       order for DME     1 Device    Oxygen: Patient requires supplemental Oxygen 4 LPM via nasal canula with activity. Please provide patient with a home unit and with portability capability. Oxygen will be for a lifetime.    Hypoxia, ILD (interstitial lung disease) (H)       pantoprazole 20 MG EC tablet    PROTONIX    60 tablet    Take 1-2 tablets (20-40 mg) by mouth daily    Gastroesophageal reflux disease without esophagitis       PARoxetine 10 MG tablet    PAXIL    30 tablet    TAKE 1 TABLET (10MG) BY MOUTH AT BEDTIME    Major depressive disorder, recurrent episode, moderate (H)       polyethylene glycol Packet    MIRALAX/GLYCOLAX    7 packet    Take 17 g by mouth daily as needed for constipation    Constipation, unspecified constipation type       potassium chloride SA 20 MEQ CR tablet    K-DUR/KLOR-CON M    180 tablet    Take 2 tablets (40 mEq) by  mouth daily    Acute on chronic heart failure, unspecified heart failure type (H), Acute and chronic respiratory failure with hypoxia (H), (HFpEF) heart failure with preserved ejection fraction (H)       predniSONE 10 MG tablet    DELTASONE    90 tablet    Take 2 tablets for three days (4/12-14), then take 1 tablet daily (10 mg daily starting 4/15)    ILD (interstitial lung disease) (H), Sjogren's syndrome (H)       spironolactone 25 MG tablet    ALDACTONE    180 tablet    Take 2 tablets (50 mg) by mouth daily    Chronic diastolic congestive heart failure (H)       traZODone 50 MG tablet    DESYREL    90 tablet    TAKE 1/2-1 TABLET BY MOUTH NIGHTLY AS NEEDED, CAN TITRATE DOWN TO 1/2TAB OR UP TO 2TABS AS NEEDED    Insomnia due to medical condition       TYLENOL 500 MG tablet   Generic drug:  acetaminophen      Take 500 mg by mouth nightly as needed    Dizziness       warfarin 7.5 MG tablet    COUMADIN    100 tablet    Current dose is 7.5 mg daily.  Dose adjusted per INR result.    Atrial fibrillation with controlled ventricular response (H)       * Notice:  This list has 8 medication(s) that are the same as other medications prescribed for you. Read the directions carefully, and ask your doctor or other care provider to review them with you.

## 2017-10-26 NOTE — LETTER
10/26/2017      RE: Betty Tee  [address]  [facility] [address]       Dear Colleague,    Thank you for the opportunity to participate in the care of your patient, Betty Tee, at the [facility] HEART [facility] at [facility]. Please see a copy of my visit note below.    Follow up HFpEF    HPI:   76 yo F with interstitial lung disease (moderate restriction), Sjogren's syndrome, HTN, permanent atrial fibrillation, diverticulitis s/p colectomy 2011 who I am following for HFpEF. She is here for routine HFpEF follow up.     Prior to the last month, she felt her breathing had been relatively stable. She has now had a cough for the last 2 weeks with associated wheezing and she was placed on antibiotics. She does feel as though this is improving somewhat but she is still bringing up mucous and she has had night sweats. Her weight has been stable. She denies orthopnea/ PND. Her leg swelling is improved from the last we saw her and is now minimal.  She denies chest pain.     She does use oxygen when doing more strenuous physical activity.   .    PAST MEDICAL HISTORY:  Past Medical History:   Diagnosis Date     Alcohol abuse, in remission      Allergic rhinitis, cause unspecified     allegra helps when she takes it     Antiplatelet or antithrombotic long-term use      Atrial fibrillation (H)     in hosp in 11/11 after surgery w/ fluid overload     Cardiomegaly     LVH on stress echo- cardiac w/u at [facility]- neg CT scan for PE, neg stress echo in 8/06     Chest pain, unspecified      Disorder of bone and cartilage, unspecified     osteopenia (had been on prempro), improved on 6/06 dexa, stable dexa 11/10     Diverticulosis of colon (without mention of hemorrhage)     last episode yrs ago     Essential hypertension, benign      Gastro-oesophageal reflux disease      Insomnia, unspecified     weaned off clonazepam     Irregular heart beat      Lumbago 7/09    MRI with [facility],  now seeing Dr. Cain for sciatic sx's     Major depressive disorder, recurrent episode, moderate (H)      Obstructive sleep apnea      Osteoarthrosis, unspecified whether generalized or localized, unspecified site      Sjogren's syndrome (H)      Sleep apnea      Tobacco use disorder     chantix in 9/07, started again in 6/08, working       FAMILY HISTORY:  Mother had MI and CHF in her 60's. 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    alendronate (FOSAMAX) 70 MG tablet Take 1 tablet (70 mg) by mouth every 7 days    lisinopril (PRINIVIL/ZESTRIL) 2.5 MG tablet TAKE 1 TABLET (2.5 MG) BY MOUTH DAILY    LANTUS SOLOSTAR 100 UNIT/ML soln INJECT 25 UNTIS SUBCUTANEOUSLY EVERY DAY    atorvastatin (LIPITOR) 20 MG tablet Take 1 tablet (20 mg) by mouth daily    potassium chloride SA (K-DUR/KLOR-CON M) 20 MEQ CR tablet Take 2 tablets (40 mEq) by mouth daily    escitalopram (LEXAPRO) 10 MG tablet Take 1 tablet (10 mg) by mouth daily    azithromycin (ZITHROMAX) 250 MG tablet Two tablets first day, then one tablet daily for four days.    metFORMIN (GLUCOPHAGE-XR) 500 MG 24 hr tablet TAKE 2 TABLETS (1,000 MG) BY MOUTH DAILY (WITH DINNER)    furosemide (LASIX) 40 MG tablet Take 1 tablet (40 mg) by mouth 2 times daily    NOVOLOG FLEXPEN 100 UNIT/ML soln INJECT 12 UNITS SUBCUTANEOUS 3 TIMES DAILY (WITH MEALS)    montelukast (SINGULAIR) 10 MG tablet Take 1 tablet (10 mg) by mouth At Bedtime    warfarin (COUMADIN) 7.5 MG tablet Current dose is 7.5 mg daily.  Dose adjusted per INR result.    spironolactone (ALDACTONE) 25 MG tablet Take 2 tablets (50 mg) by mouth daily    traZODone (DESYREL) 50 MG tablet TAKE 1/2-1 TABLET BY  MOUTH NIGHTLY AS NEEDED, CAN TITRATE DOWN TO 1/2TAB OR UP TO 2TABS AS NEEDED    blood glucose monitoring (NO BRAND SPECIFIED) test strip Use to test blood sugars 4 times daily or as directed    blood glucose monitoring (ACCU-CHEK MULTICLIX) lancets Use to test blood sugar 4 times daily or as directed.    insulin pen needle (B-D U/F) 31G X 8 MM Use 4 times daily (with Lantus and Novolog) or as directed.    insulin pen needle (BD JANE U/F) 32G X 4 MM Use 4 daily as directed.    blood glucose monitoring (ACCU-CHEK SHANNON PLUS) test strip Use to test blood sugar 4 times daily or as directed.  Ok to substitute alternative if insurance prefers.    blood glucose monitoring (ACCU-CHEK FASTCLIX) lancets Use to test blood sugar 4 times daily or as directed.  Ok to substitute alternative if insurance prefers.    predniSONE (DELTASONE) 10 MG tablet Take 2 tablets for three days (4/12-14), then take 1 tablet daily (10 mg daily starting 4/15)    pantoprazole (PROTONIX) 20 MG EC tablet Take 1-2 tablets (20-40 mg) by mouth daily    ketoconazole (NIZORAL) 2 % cream Apply topically 2 times daily    metoprolol (TOPROL-XL) 100 MG 24 hr tablet Take 1 tablet (100 mg) by mouth daily    albuterol (PROAIR HFA, PROVENTIL HFA, VENTOLIN HFA) 108 (90 BASE) MCG/ACT inhaler Inhale 2 puffs into the lungs every 6 hours    order for DME Oxygen: Patient requires supplemental Oxygen 4 LPM via nasal canula with activity. Please provide patient with a home unit and with portability capability. Oxygen will be for a lifetime.    polyethylene glycol (MIRALAX/GLYCOLAX) packet Take 17 g by mouth daily as needed for constipation    acyclovir (ZOVIRAX) 5 % ointment Apply topically 6 times daily    fluticasone (FLOVENT HFA) 220 MCG/ACT inhaler Inhale 2 puffs into the lungs 2 times daily    acyclovir (ZOVIRAX) 400 MG tablet Take 400 mg by mouth See Admin Instructions 5 times daily as needed for outbreaks    fluticasone (FLONASE) 50 MCG/ACT nasal spray Spray 1-2  "sprays into both nostrils daily    COMPRESSION STOCKINGS Wear compression stockings in affected leg (right leg) or both legs most of the time during the day and take them off at night.    acetaminophen (TYLENOL) 500 MG tablet Take 500 mg by mouth nightly as needed     PARoxetine (PAXIL) 10 MG tablet TAKE 1 TABLET (10MG) BY MOUTH AT BEDTIME        ROS:   Constitutional: No fever, chills. + sweats No weight gain/loss.   ENT: No visual disturbance, ear ache, epistaxis, sore throat.   Allergies/Immunologic: Negative.   Respiratory: + cough, no hemoptysis.   Cardiovascular: As per HPI.   GI: No nausea, vomiting, hematemesis, melena, or hematochezia.   : No urinary frequency, dysuria, or hematuria.   Integument: Negative.   Psychiatric: Negative.   Neuro: Negative.   Endocrinology: Negative.   Musculoskeletal: Negative.    EXAM:  /76 (BP Location: Left arm, Patient Position: Chair, Cuff Size: Adult Regular)  Pulse 84  Ht 1.702 m (5' 7\")  Wt 95.4 kg (210 lb 4.8 oz)  SpO2 96%  BMI 32.94 kg/m2  General: appears comfortable, alert and articulate  Head: normocephalic, atraumatic  Eyes: anicteric sclera, EOMI  Neck: no adenopathy  Orophyarynx: moist mucosa, no lesions, dentition intact  Heart: Irregular, S1/S2, no murmur, gallop, rub, estimated JVP <10   Lungs: diffuse expiratory wheezing, no crackles,no dullness   Abdomen: soft, non-tender, bowel sounds present, no hepatosplenomegaly  Extremities: trace LE edema, no clubbing, cyanosis.  Neurological: normal speech and affect, no gross motor deficits    Labs:  CBC RESULTS:  Lab Results   Component Value Date    WBC 11.9 (H) 10/06/2017    RBC 4.54 10/06/2017    HGB 13.2 10/06/2017    HCT 41.0 10/06/2017    MCV 90 10/06/2017    MCH 29.1 10/06/2017    MCHC 32.2 10/06/2017    RDW 16.5 (H) 10/06/2017     10/06/2017       CMP RESULTS:  Lab Results   Component Value Date     10/26/2017    POTASSIUM 3.6 10/26/2017    CHLORIDE 100 10/26/2017    CO2 29 " 10/26/2017    ANIONGAP 7 10/26/2017     (H) 10/26/2017    BUN 20 10/26/2017    CR 0.89 10/26/2017    GFRESTIMATED 62 10/26/2017    GFRESTBLACK 75 10/26/2017    CLAUDY 8.8 10/26/2017    BILITOTAL 0.6 10/06/2017    ALBUMIN 3.1 (L) 10/06/2017    ALKPHOS 121 10/06/2017    ALT 43 10/06/2017    AST 37 10/06/2017        INR RESULTS:  Lab Results   Component Value Date    INR 3.67 (H) 10/26/2017       Lab Results   Component Value Date    MAG 2.0 04/11/2017     Lab Results   Component Value Date    NTBNPI 3531 (H) 04/06/2017     Lab Results   Component Value Date    NTBNP 866 (H) 08/30/2017     HgA1c 7% Sept 2016    ECG :10/26/16 atrial fibrillation HR 72    48hr holter July 2016- generally controlled atrial fibrillation    July 2016 Complete Portable Echo Adult. Contrast Optison. Optison (NDC #4689-7610-20)  given intravenously. Patient was given 4 ml mixture of 3 ml Optison and 6 ml  saline. 5 ml wasted. Interpretation Summary  Atrial fibrillation  Left ventricular function, chamber size, wall motion, and wall thickness are  normal.The EF is 60-65%. Normal LV size and wall thickness, mild bilateral atrial enlargement  PA pressure cannot be determined  Estimated RA pressure 8 mmHg    Regadenosen MPI 2014: no fixed or inducible defects      Assessment and Plan:  In summary this is a very pleasant 76 year old female with suspected HFpEF who is referred today for further evaluation and treatment.     In support of the diagnosis of HFpEF includes mild LA enlargement, echocardiographic signs of increase LV filling pressures, increased nt-bnp, as well as a diuretic requirement and symptomatic improvement on diuretics.    The risk factors for HFpEF in her include age, gender, HTN, pre-diabetes, sleep apnea and obesity.  A prior stress test was negative for CAD. She is presently euvolemic on exam and is NYHA class III (mutifactorial) I am increasing her diuretics as listed below.     The mainstays of therapy for HFpEF include  volume management, blood pressure control, treating underlying sleep apnea if present, weight management, exercise training, rate control for atrial fibrillation as well as consideration for a rhythm control strategy, as well as consideration for clinical trials.  I do have her on spironolactone given the results of the TOPCAT trial. Patients have symptomatically felt very improved on this medication. Her a fib rates have been under better control recently and her ischemia evaluation is negative.     Plan for HFpEF: continue current dose of diuretics, BP control     Other:   Hypertension: controlled     Cough/SOB: on antibiotics. I do not think this is volume based on her exam. I offered a chest x ray today and she may need a steroid pulse +/- nebulizer based on her exam- I have asked her to reach out to PMD or pulmonary next week if her wheezing/cough are not improved.     Af fib:- atrial fibrillation [id number] (age >75, HTN, CHF, gender) - rates controlled on metoprolol     Primary prevention: on ASA, statin        We look forward to seeing Betty Tee in back in  8 months for follow up up.She will see CORE in 4 months         Radha Rosa  [facility]      Director,  [facility] HFpEF Program       CC  Patient Care Team:  Ilene Tristan MD as PCP - General  [facility]y neff MD as Resident  William Azevedo MD as MD (Internal Medicine)  Kirill Polk MD as MD (Otolaryngology)  Aubrey Hurtado MD as MD (Cardiology)  Jasvir Franco MD as Resident (Internal Medicine)  Danay Payne, RN as Nurse Coordinator (Clinical Cardiac Electrophysiology)  Anderson Perez MD as MD (Clinical Cardiac Electrophysiology)  Radha Rosa MD as MD (Cardiology)  Kiesha Elias, LIZY CNP as Nurse Practitioner (Cardiology)  Christianne Bee, RN as Nurse Coordinator (Cardiology)  KIESHA ELIAS

## 2017-10-26 NOTE — PROGRESS NOTES
Follow up HFpEF    HPI:   76 yo F with interstitial lung disease (moderate restriction), Sjogren's syndrome, HTN, permanent atrial fibrillation, diverticulitis s/p colectomy 2011 who I am following for HFpEF. She is here for routine HFpEF follow up.     Prior to the last month, she felt her breathing had been relatively stable. She has now had a cough for the last 2 weeks with associated wheezing and she was placed on antibiotics. She does feel as though this is improving somewhat but she is still bringing up mucous and she has had night sweats. Her weight has been stable. She denies orthopnea/ PND. Her leg swelling is improved from the last we saw her and is now minimal.  She denies chest pain.     She does use oxygen when doing more strenuous physical activity.   .    PAST MEDICAL HISTORY:  Past Medical History:   Diagnosis Date     Alcohol abuse, in remission      Allergic rhinitis, cause unspecified     allegra helps when she takes it     Antiplatelet or antithrombotic long-term use      Atrial fibrillation (H)     in hosp in 11/11 after surgery w/ fluid overload     Cardiomegaly     LVH on stress echo- cardiac w/u at [facility]- neg CT scan for PE, neg stress echo in 8/06     Chest pain, unspecified      Disorder of bone and cartilage, unspecified     osteopenia (had been on prempro), improved on 6/06 dexa, stable dexa 11/10     Diverticulosis of colon (without mention of hemorrhage)     last episode yrs ago     Essential hypertension, benign      Gastro-oesophageal reflux disease      Insomnia, unspecified     weaned off clonazepam     Irregular heart beat      Lumbago 7/09    MRI with [facility], now seeing Dr. Cain for sciatic sx's     Major depressive disorder, recurrent episode, moderate (H)      Obstructive sleep apnea      Osteoarthrosis, unspecified whether generalized or localized, unspecified site      Sjogren's syndrome (H)      Sleep apnea      Tobacco use disorder     chantix in 9/07, started again in  6/08, working       FAMILY HISTORY:  Mother had MI and CHF in her 60's. 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    alendronate (FOSAMAX) 70 MG tablet Take 1 tablet (70 mg) by mouth every 7 days    lisinopril (PRINIVIL/ZESTRIL) 2.5 MG tablet TAKE 1 TABLET (2.5 MG) BY MOUTH DAILY    LANTUS SOLOSTAR 100 UNIT/ML soln INJECT 25 UNTIS SUBCUTANEOUSLY EVERY DAY    atorvastatin (LIPITOR) 20 MG tablet Take 1 tablet (20 mg) by mouth daily    potassium chloride SA (K-DUR/KLOR-CON M) 20 MEQ CR tablet Take 2 tablets (40 mEq) by mouth daily    escitalopram (LEXAPRO) 10 MG tablet Take 1 tablet (10 mg) by mouth daily    azithromycin (ZITHROMAX) 250 MG tablet Two tablets first day, then one tablet daily for four days.    metFORMIN (GLUCOPHAGE-XR) 500 MG 24 hr tablet TAKE 2 TABLETS (1,000 MG) BY MOUTH DAILY (WITH DINNER)    furosemide (LASIX) 40 MG tablet Take 1 tablet (40 mg) by mouth 2 times daily    NOVOLOG FLEXPEN 100 UNIT/ML soln INJECT 12 UNITS SUBCUTANEOUS 3 TIMES DAILY (WITH MEALS)    montelukast (SINGULAIR) 10 MG tablet Take 1 tablet (10 mg) by mouth At Bedtime    warfarin (COUMADIN) 7.5 MG tablet Current dose is 7.5 mg daily.  Dose adjusted per INR result.    spironolactone (ALDACTONE) 25 MG tablet Take 2 tablets (50 mg) by mouth daily    traZODone (DESYREL) 50 MG tablet TAKE 1/2-1 TABLET BY MOUTH NIGHTLY AS NEEDED, CAN TITRATE DOWN TO 1/2TAB OR UP TO 2TABS AS NEEDED    blood glucose monitoring (NO BRAND SPECIFIED) test strip Use to test blood sugars 4 times daily or as directed    blood glucose monitoring (ACCU-CHEK MULTICLIX) lancets Use to test blood sugar 4 times daily or as directed.    insulin pen needle  (B-D U/F) 31G X 8 MM Use 4 times daily (with Lantus and Novolog) or as directed.    insulin pen needle (BD JANE U/F) 32G X 4 MM Use 4 daily as directed.    blood glucose monitoring (ACCU-CHEK SHANNON PLUS) test strip Use to test blood sugar 4 times daily or as directed.  Ok to substitute alternative if insurance prefers.    blood glucose monitoring (ACCU-CHEK FASTCLIX) lancets Use to test blood sugar 4 times daily or as directed.  Ok to substitute alternative if insurance prefers.    predniSONE (DELTASONE) 10 MG tablet Take 2 tablets for three days (4/12-14), then take 1 tablet daily (10 mg daily starting 4/15)    pantoprazole (PROTONIX) 20 MG EC tablet Take 1-2 tablets (20-40 mg) by mouth daily    ketoconazole (NIZORAL) 2 % cream Apply topically 2 times daily    metoprolol (TOPROL-XL) 100 MG 24 hr tablet Take 1 tablet (100 mg) by mouth daily    albuterol (PROAIR HFA, PROVENTIL HFA, VENTOLIN HFA) 108 (90 BASE) MCG/ACT inhaler Inhale 2 puffs into the lungs every 6 hours    order for DME Oxygen: Patient requires supplemental Oxygen 4 LPM via nasal canula with activity. Please provide patient with a home unit and with portability capability. Oxygen will be for a lifetime.    polyethylene glycol (MIRALAX/GLYCOLAX) packet Take 17 g by mouth daily as needed for constipation    acyclovir (ZOVIRAX) 5 % ointment Apply topically 6 times daily    fluticasone (FLOVENT HFA) 220 MCG/ACT inhaler Inhale 2 puffs into the lungs 2 times daily    acyclovir (ZOVIRAX) 400 MG tablet Take 400 mg by mouth See Admin Instructions 5 times daily as needed for outbreaks    fluticasone (FLONASE) 50 MCG/ACT nasal spray Spray 1-2 sprays into both nostrils daily    COMPRESSION STOCKINGS Wear compression stockings in affected leg (right leg) or both legs most of the time during the day and take them off at night.    acetaminophen (TYLENOL) 500 MG tablet Take 500 mg by mouth nightly as needed     PARoxetine (PAXIL) 10 MG tablet TAKE 1 TABLET (10MG) BY  "MOUTH AT BEDTIME        ROS:   Constitutional: No fever, chills. + sweats No weight gain/loss.   ENT: No visual disturbance, ear ache, epistaxis, sore throat.   Allergies/Immunologic: Negative.   Respiratory: + cough, no hemoptysis.   Cardiovascular: As per HPI.   GI: No nausea, vomiting, hematemesis, melena, or hematochezia.   : No urinary frequency, dysuria, or hematuria.   Integument: Negative.   Psychiatric: Negative.   Neuro: Negative.   Endocrinology: Negative.   Musculoskeletal: Negative.    EXAM:  /76 (BP Location: Left arm, Patient Position: Chair, Cuff Size: Adult Regular)  Pulse 84  Ht 1.702 m (5' 7\")  Wt 95.4 kg (210 lb 4.8 oz)  SpO2 96%  BMI 32.94 kg/m2  General: appears comfortable, alert and articulate  Head: normocephalic, atraumatic  Eyes: anicteric sclera, EOMI  Neck: no adenopathy  Orophyarynx: moist mucosa, no lesions, dentition intact  Heart: Irregular, S1/S2, no murmur, gallop, rub, estimated JVP <10   Lungs: diffuse expiratory wheezing, no crackles,no dullness   Abdomen: soft, non-tender, bowel sounds present, no hepatosplenomegaly  Extremities: trace LE edema, no clubbing, cyanosis.  Neurological: normal speech and affect, no gross motor deficits    Labs:  CBC RESULTS:  Lab Results   Component Value Date    WBC 11.9 (H) 10/06/2017    RBC 4.54 10/06/2017    HGB 13.2 10/06/2017    HCT 41.0 10/06/2017    MCV 90 10/06/2017    MCH 29.1 10/06/2017    MCHC 32.2 10/06/2017    RDW 16.5 (H) 10/06/2017     10/06/2017       CMP RESULTS:  Lab Results   Component Value Date     10/26/2017    POTASSIUM 3.6 10/26/2017    CHLORIDE 100 10/26/2017    CO2 29 10/26/2017    ANIONGAP 7 10/26/2017     (H) 10/26/2017    BUN 20 10/26/2017    CR 0.89 10/26/2017    GFRESTIMATED 62 10/26/2017    GFRESTBLACK 75 10/26/2017    CLAUDY 8.8 10/26/2017    BILITOTAL 0.6 10/06/2017    ALBUMIN 3.1 (L) 10/06/2017    ALKPHOS 121 10/06/2017    ALT 43 10/06/2017    AST 37 10/06/2017        INR " RESULTS:  Lab Results   Component Value Date    INR 3.67 (H) 10/26/2017       Lab Results   Component Value Date    MAG 2.0 04/11/2017     Lab Results   Component Value Date    NTBNPI 3531 (H) 04/06/2017     Lab Results   Component Value Date    NTBNP 866 (H) 08/30/2017     HgA1c 7% Sept 2016    ECG :10/26/16 atrial fibrillation HR 72    48hr holter July 2016- generally controlled atrial fibrillation    July 2016 Complete Portable Echo Adult. Contrast Optison. Optison (NDC #9103-5817-81)  given intravenously. Patient was given 4 ml mixture of 3 ml Optison and 6 ml  saline. 5 ml wasted. Interpretation Summary  Atrial fibrillation  Left ventricular function, chamber size, wall motion, and wall thickness are  normal.The EF is 60-65%. Normal LV size and wall thickness, mild bilateral atrial enlargement  PA pressure cannot be determined  Estimated RA pressure 8 mmHg    Regadenosen MPI 2014: no fixed or inducible defects      Assessment and Plan:  In summary this is a very pleasant 76 year old female with suspected HFpEF who is referred today for further evaluation and treatment.     In support of the diagnosis of HFpEF includes mild LA enlargement, echocardiographic signs of increase LV filling pressures, increased nt-bnp, as well as a diuretic requirement and symptomatic improvement on diuretics.    The risk factors for HFpEF in her include age, gender, HTN, pre-diabetes, sleep apnea and obesity.  A prior stress test was negative for CAD. She is presently euvolemic on exam and is NYHA class III (mutifactorial) I am increasing her diuretics as listed below.     The mainstays of therapy for HFpEF include volume management, blood pressure control, treating underlying sleep apnea if present, weight management, exercise training, rate control for atrial fibrillation as well as consideration for a rhythm control strategy, as well as consideration for clinical trials.  I do have her on spironolactone given the results of the  TOPCAT trial. Patients have symptomatically felt very improved on this medication. Her a fib rates have been under better control recently and her ischemia evaluation is negative.     Plan for HFpEF: continue current dose of diuretics, BP control     Other:   Hypertension: controlled     Cough/SOB: on antibiotics. I do not think this is volume based on her exam. I offered a chest x ray today and she may need a steroid pulse +/- nebulizer based on her exam- I have asked her to reach out to PMD or pulmonary next week if her wheezing/cough are not improved.     Af fib:- atrial fibrillation [id number] (age >75, HTN, CHF, gender) - rates controlled on metoprolol     Primary prevention: on ASA, statin        We look forward to seeing Betty Tee in back in  8 months for follow up up.She will see CORE in 4 months         Radha Rosa  [facility] Card[facility]      Director,  [facility] HFpEF Program       CC  Patient Care Team:  Ilene Tristan MD as PCP - General  [facility] MD as Resident  William Azevedo MD as MD (Internal Medicine)  Kirill Polk MD as MD (Otolaryngology)  Aubrey Hurtado MD as MD (Cardiology)  Jasvir Franco MD as Resident (Internal Medicine)  Danay Payne, JASON as Nurse Coordinator (Clinical Cardiac Electrophysiology)  Anderson Perez MD as MD (Clinical Cardiac Electrophysiology)  Radha Rosa MD as MD (Cardiology)  Kiesha Elias, LIZY CNP as Nurse Practitioner (Cardiology)  Christianne Bee, JASON as Nurse Coordinator (Cardiology)  KIESHA ELIAS

## 2017-10-30 DIAGNOSIS — E11.69 TYPE 2 DIABETES MELLITUS WITH OTHER SPECIFIED COMPLICATION (H): ICD-10-CM

## 2017-10-31 RX ORDER — INSULIN ASPART 100 [IU]/ML
INJECTION, SOLUTION INTRAVENOUS; SUBCUTANEOUS
Qty: 15 ML | Refills: 1 | Status: SHIPPED | OUTPATIENT
Start: 2017-10-31 | End: 2018-01-23

## 2017-11-01 ENCOUNTER — MYC MEDICAL ADVICE (OUTPATIENT)
Dept: FAMILY MEDICINE | Facility: CLINIC | Age: 77
End: 2017-11-01

## 2017-11-01 ENCOUNTER — TELEPHONE (OUTPATIENT)
Dept: FAMILY MEDICINE | Facility: CLINIC | Age: 77
End: 2017-11-01
Payer: MEDICARE

## 2017-11-01 LAB — INR POINT OF CARE: 3.67 (ref 0.86–1.14)

## 2017-11-01 PROCEDURE — 36416 COLLJ CAPILLARY BLOOD SPEC: CPT | Performed by: FAMILY MEDICINE

## 2017-11-01 PROCEDURE — 85610 PROTHROMBIN TIME: CPT | Mod: QW | Performed by: FAMILY MEDICINE

## 2017-11-01 PROCEDURE — 99207 ZZC NO CHARGE NURSE ONLY: CPT | Performed by: FAMILY MEDICINE

## 2017-11-01 NOTE — TELEPHONE ENCOUNTER
CW,  I spoke to pt (home number) and she has been taking coumadin 7.5mg q d.    She is also updating you with BGs.  Have been ranging 's before meals and meds.  Runs a lot in the 120's per pt.  Takes insulin regularly, 3 times a day per pt.    Please advise.  Thanks,  Radha Otero RN

## 2017-11-01 NOTE — TELEPHONE ENCOUNTER
Could you call pt and clarify her dosing?  Unsure if she'll check her msgs this afternoon.  Thanks!  CW

## 2017-11-01 NOTE — TELEPHONE ENCOUNTER
ANTICOAGULATION FOLLOW-UP CLINIC VISIT    Patient Name:  Betty Tee  Date:  11/1/2017  Contact Type:  Telephone    SUBJECTIVE:  Bleeding Signs/Symptoms: None  Thromboembolic Signs/Symptoms: None    Medication Changes:  No  Dietary Changes:  No  Bacterial/Viral Infection:  No    Missed Coumadin Doses:  None  Other Concerns:  No      ASSESSMENT/PLAN:  See: ANTICOAGULATION QIC flow sheet.    MEDICATION MANAGEMENT  Education Provided:    INR: 3.67  Dose 5mg Tu, 7.5 ROW and recheck in 2 weeks  Dose instructions given to pt via telephone.    Dosage adjustment made based on physician directed care plan. JANA/JIMI JOSE

## 2017-11-01 NOTE — TELEPHONE ENCOUNTER
CW,  Please see pt's message below.  She has not responded to mine yet but did you want her to just come back in for INR?  Per Sophia, nurses can't dose pt on that INR from 10/26.  Please advise.  Thanks,  Radha Otero RN

## 2017-11-08 ENCOUNTER — OFFICE VISIT (OUTPATIENT)
Dept: FAMILY MEDICINE | Facility: CLINIC | Age: 77
End: 2017-11-08
Payer: MEDICARE

## 2017-11-08 ENCOUNTER — TELEPHONE (OUTPATIENT)
Dept: FAMILY MEDICINE | Facility: CLINIC | Age: 77
End: 2017-11-08
Payer: MEDICARE

## 2017-11-08 VITALS
DIASTOLIC BLOOD PRESSURE: 62 MMHG | BODY MASS INDEX: 32.49 KG/M2 | SYSTOLIC BLOOD PRESSURE: 100 MMHG | HEIGHT: 67 IN | OXYGEN SATURATION: 96 % | HEART RATE: 80 BPM | TEMPERATURE: 98.7 F | WEIGHT: 207 LBS | RESPIRATION RATE: 14 BRPM

## 2017-11-08 DIAGNOSIS — M89.9 DISORDER OF BONE AND CARTILAGE: ICD-10-CM

## 2017-11-08 DIAGNOSIS — Z79.4 TYPE 2 DIABETES MELLITUS WITH HYPERGLYCEMIA, WITH LONG-TERM CURRENT USE OF INSULIN (H): ICD-10-CM

## 2017-11-08 DIAGNOSIS — I48.91 ATRIAL FIBRILLATION WITH CONTROLLED VENTRICULAR RESPONSE (H): ICD-10-CM

## 2017-11-08 DIAGNOSIS — Z79.01 LONG TERM CURRENT USE OF ANTICOAGULANT THERAPY: ICD-10-CM

## 2017-11-08 DIAGNOSIS — M94.9 DISORDER OF BONE AND CARTILAGE: ICD-10-CM

## 2017-11-08 DIAGNOSIS — M35.02 SJOGREN'S SYNDROME WITH LUNG INVOLVEMENT (H): ICD-10-CM

## 2017-11-08 DIAGNOSIS — M19.90 INFLAMMATORY ARTHRITIS: ICD-10-CM

## 2017-11-08 DIAGNOSIS — E11.65 TYPE 2 DIABETES MELLITUS WITH HYPERGLYCEMIA, WITH LONG-TERM CURRENT USE OF INSULIN (H): ICD-10-CM

## 2017-11-08 DIAGNOSIS — K21.9 GASTROESOPHAGEAL REFLUX DISEASE WITHOUT ESOPHAGITIS: ICD-10-CM

## 2017-11-08 DIAGNOSIS — J84.9 ILD (INTERSTITIAL LUNG DISEASE) (H): ICD-10-CM

## 2017-11-08 DIAGNOSIS — Z23 NEED FOR INFLUENZA VACCINATION: ICD-10-CM

## 2017-11-08 DIAGNOSIS — F33.1 MAJOR DEPRESSIVE DISORDER, RECURRENT EPISODE, MODERATE (H): Primary | ICD-10-CM

## 2017-11-08 DIAGNOSIS — R59.9 ENLARGED LYMPH NODE: ICD-10-CM

## 2017-11-08 DIAGNOSIS — D72.829 LEUKOCYTOSIS, UNSPECIFIED TYPE: ICD-10-CM

## 2017-11-08 DIAGNOSIS — I50.30 (HFPEF) HEART FAILURE WITH PRESERVED EJECTION FRACTION (H): ICD-10-CM

## 2017-11-08 LAB
BASOPHILS # BLD AUTO: 0.1 10E9/L (ref 0–0.2)
BASOPHILS NFR BLD AUTO: 0.3 %
DIFFERENTIAL METHOD BLD: ABNORMAL
EOSINOPHIL # BLD AUTO: 0.1 10E9/L (ref 0–0.7)
EOSINOPHIL NFR BLD AUTO: 0.7 %
ERYTHROCYTE [DISTWIDTH] IN BLOOD BY AUTOMATED COUNT: 16.7 % (ref 10–15)
HCT VFR BLD AUTO: 42.1 % (ref 35–47)
HGB BLD-MCNC: 13.4 G/DL (ref 11.7–15.7)
INR POINT OF CARE: 1.8 (ref 0.86–1.14)
LYMPHOCYTES # BLD AUTO: 1.1 10E9/L (ref 0.8–5.3)
LYMPHOCYTES NFR BLD AUTO: 6.9 %
MCH RBC QN AUTO: 29.2 PG (ref 26.5–33)
MCHC RBC AUTO-ENTMCNC: 31.8 G/DL (ref 31.5–36.5)
MCV RBC AUTO: 92 FL (ref 78–100)
MONOCYTES # BLD AUTO: 1 10E9/L (ref 0–1.3)
MONOCYTES NFR BLD AUTO: 6.3 %
NEUTROPHILS # BLD AUTO: 13.4 10E9/L (ref 1.6–8.3)
NEUTROPHILS NFR BLD AUTO: 85.8 %
PLATELET # BLD AUTO: 264 10E9/L (ref 150–450)
RBC # BLD AUTO: 4.59 10E12/L (ref 3.8–5.2)
WBC # BLD AUTO: 15.6 10E9/L (ref 4–11)

## 2017-11-08 PROCEDURE — 99215 OFFICE O/P EST HI 40 MIN: CPT | Mod: 25 | Performed by: FAMILY MEDICINE

## 2017-11-08 PROCEDURE — G0008 ADMIN INFLUENZA VIRUS VAC: HCPCS | Performed by: FAMILY MEDICINE

## 2017-11-08 PROCEDURE — 84443 ASSAY THYROID STIM HORMONE: CPT | Performed by: FAMILY MEDICINE

## 2017-11-08 PROCEDURE — 99207 ZZC NO CHARGE NURSE ONLY: CPT | Performed by: FAMILY MEDICINE

## 2017-11-08 PROCEDURE — 85025 COMPLETE CBC W/AUTO DIFF WBC: CPT | Performed by: FAMILY MEDICINE

## 2017-11-08 PROCEDURE — 85610 PROTHROMBIN TIME: CPT | Mod: QW | Performed by: FAMILY MEDICINE

## 2017-11-08 PROCEDURE — 90662 IIV NO PRSV INCREASED AG IM: CPT | Performed by: FAMILY MEDICINE

## 2017-11-08 PROCEDURE — 36416 COLLJ CAPILLARY BLOOD SPEC: CPT | Performed by: FAMILY MEDICINE

## 2017-11-08 ASSESSMENT — ANXIETY QUESTIONNAIRES
7. FEELING AFRAID AS IF SOMETHING AWFUL MIGHT HAPPEN: NOT AT ALL
GAD7 TOTAL SCORE: 4
3. WORRYING TOO MUCH ABOUT DIFFERENT THINGS: NOT AT ALL
6. BECOMING EASILY ANNOYED OR IRRITABLE: SEVERAL DAYS
1. FEELING NERVOUS, ANXIOUS, OR ON EDGE: NOT AT ALL
2. NOT BEING ABLE TO STOP OR CONTROL WORRYING: NOT AT ALL
IF YOU CHECKED OFF ANY PROBLEMS ON THIS QUESTIONNAIRE, HOW DIFFICULT HAVE THESE PROBLEMS MADE IT FOR YOU TO DO YOUR WORK, TAKE CARE OF THINGS AT HOME, OR GET ALONG WITH OTHER PEOPLE: NOT DIFFICULT AT ALL
5. BEING SO RESTLESS THAT IT IS HARD TO SIT STILL: MORE THAN HALF THE DAYS

## 2017-11-08 ASSESSMENT — PATIENT HEALTH QUESTIONNAIRE - PHQ9
SUM OF ALL RESPONSES TO PHQ QUESTIONS 1-9: 4
5. POOR APPETITE OR OVEREATING: SEVERAL DAYS

## 2017-11-08 NOTE — MR AVS SNAPSHOT
After Visit Summary   11/8/2017    Betty Tee    MRN: [id number]           Patient Information     Date Of Birth          1940        Visit Information        Provider Department      11/8/2017 1:00 PM Ilene Tristan MD [facility]        Today's Diagnoses     Major depressive disorder, recurrent episode, moderate    -  1    Enlarged lymph node        Sjogren's syndrome with lung involvement (H)        ILD (interstitial lung disease) (H)        Leukocytosis, unspecified type        Atrial fibrillation with controlled ventricular response (H)        (HFpEF) heart failure with preserved ejection fraction (H)        Inflammatory arthritis        Gastroesophageal reflux disease without esophagitis        Disorder of bone and cartilage        Need for influenza vaccination        Type 2 diabetes mellitus with hyperglycemia, with long-term current use of insulin (H)        Long term current use of anticoagulant therapy           Follow-ups after your visit        Your next 10 appointments already scheduled     Dec 19, 2017  2:15 PM CST   (Arrive by 2:00 PM)   Return Visit with Kirill Polk MD   [facility] Nose [facility] Thro[facility] ([facility])    [address] [place] [id number]   [phone]            Jan 03, 2018  1:30 PM CST   FULL PULMONARY FUNCTION with UC PFL A   [facility] Pulmonary Function Testing ([facility])    [address] [place] [id number]   [phone]            Jan 03, 2018  2:30 PM CST   (Arrive by 2:15 PM)   Return Interstitial Lung with Meño Walsh MD   [facility] ([facility])    [address] [place] [address]   [phone]            Jan 03, 2018  3:30 PM CST   FULL PULMONARY FUNCTION with UC PFL C   [facility] Pulmonary Function Testing ([facility]  [place])    [address] [place] [address]   [phone]            Jan 03, 2018  4:30 PM CST   (Arrive by 4:15 PM)   Return Interstitial Lung with Meño Walsh MD   [facility] ([facility])    [address] [place] [address]   [phone]            Jan 18, 2018  1:00 PM CST   (Arrive by 12:45 PM)   Return Visit with Carmenza Stringer MD   [facility] Rheumatology ([facility])    [address] [place] [address]   [phone]            Jan 24, 2018  1:00 PM CST   Office Visit with Ilene Tristan MD   [facility] ([facility])    [address] [facility] [id number] [phone]           Bring a current list of meds and any records pertaining to this visit. For Physicals, please bring immunization records and any forms needing to be filled out. Please arrive 10 minutes early to complete paperwork.            Apr 26, 2018  3:00 PM CDT   Lab with  LAB   [facility] Lab ([facility])    [address] [place] [id number] [phone]            Apr 26, 2018  3:30 PM CDT   (Arrive by 3:15 PM)   RETURN HEART FAILURE with Radha Rosa MD   [facility] Heart Care ([facility])    [address] [place] [id number] [phone]              Who to contact     If you have questions or need follow up information about today's clinic visit or your schedule please contact [facility] directly at [phone].  Normal or non-critical lab and imaging results will be communicated to you by MyChart, letter or phone within 4 business days after the clinic has received the results. If you do not hear from us within 7 days, please contact the clinic through MyChart or phone. If you have a critical or  "abnormal lab result, we will notify you by phone as soon as possible.  Submit refill requests through [employer] or call your pharmacy and they will forward the refill request to us. Please allow 3 business days for your refill to be completed.          Additional Information About Your Visit        [employer] Information     [employer] gives you secure access to your electronic health record. If you see a primary care provider, you can also send messages to your care team and make appointments. If you have questions, please call your primary care clinic.  If you do not have a primary care provider, please call [phone] and they will assist you.        Care EveryWhere ID     This is your Care EveryWhere ID. This could be used by other organizations to access your [place] medical records  [id number]        Your Vitals Were     Pulse Temperature Respirations Height Pulse Oximetry Breastfeeding?    80 98.7  F (37.1  C) (Oral) 14 5' 7\" (1.702 m) 96% No    BMI (Body Mass Index)                   32.42 kg/m2            Blood Pressure from Last 3 Encounters:   11/08/17 100/62   10/26/17 113/76   10/19/17 139/85    Weight from Last 3 Encounters:   11/08/17 207 lb (93.9 kg)   10/26/17 210 lb 4.8 oz (95.4 kg)   10/19/17 210 lb 1.6 oz (95.3 kg)              We Performed the Following     CBC with platelets and differential     FLU VACCINE, INCREASED ANTIGEN, PRESV FREE     INR point of care     TSH with free T4 reflex          Today's Medication Changes          These changes are accurate as of: 11/8/17 11:59 PM.  If you have any questions, ask your nurse or doctor.               These medicines have changed or have updated prescriptions.        Dose/Directions    acyclovir 5 % ointment   Commonly known as:  ZOVIRAX   This may have changed:  additional instructions   Used for:  Recurrent cold sores        Apply topically 6 times daily   Quantity:  15 g   Refills:  3       fluticasone 50 MCG/ACT spray   Commonly known as:  " FLONASE   This may have changed:    - when to take this  - reasons to take this   Used for:  Seasonal allergic rhinitis        Dose:  1-2 spray   Spray 1-2 sprays into both nostrils daily   Quantity:  16 g   Refills:  5                Primary Care Provider Office Phone # Fax #    Ilene Tristan [phone]       [address] [facility]  [address]  [facility] [address]        Equal Access to Services     GENNY STONER : Hadii aad ku hadasho Soomaali, waaxda luqadaha, qaybta kaalmada adeegyada, waxay idiin hayaan adeeg kharash la'sanket . So [facility] [phone].    ATENCIÓN: Si jewel covarrubias, tiene a conti disposición servicios gratuitos de asistencia lingüística. [facility] [phone].    We comply with applicable federal civil rights laws and [place] laws. We do not discriminate on the basis of race, color, national origin, age, disability, sex, sexual orientation, or gender identity.            Thank you!     Thank you for choosing [facility]  for your care. Our goal is always to provide you with excellent care. Hearing back from our patients is one way we can continue to improve our services. Please take a few minutes to complete the written survey that you may receive in the mail after your visit with us. Thank you!             Your Updated Medication List - Protect others around you: Learn how to safely use, store and throw away your medicines at www.disposemymeds.org.          This list is accurate as of: 11/8/17 11:59 PM.  Always use your most recent med list.                   Brand Name Dispense Instructions for use Diagnosis    acyclovir 400 MG tablet    ZOVIRAX     Take 400 mg by mouth See Admin Instructions 5 times daily as needed for outbreaks        acyclovir 5 % ointment    ZOVIRAX    15 g    Apply topically 6 times daily    Recurrent cold sores       albuterol 108 (90 BASE) MCG/ACT Inhaler    PROAIR HFA/PROVENTIL HFA/VENTOLIN HFA    1 Inhaler    Inhale 2 puffs into the lungs every  6 hours    ILD (interstitial lung disease) (H)       alendronate 70 MG tablet    FOSAMAX    4 tablet    Take 1 tablet (70 mg) by mouth every 7 days    Other osteoporosis without current pathological fracture       atorvastatin 20 MG tablet    LIPITOR    90 tablet    Take 1 tablet (20 mg) by mouth daily    Hyperlipidemia LDL goal <100       * azithromycin 250 MG tablet    ZITHROMAX    6 tablet    Two tablets first day, then one tablet daily for four days.    Acute recurrent maxillary sinusitis       * azithromycin 250 MG tablet    ZITHROMAX    6 tablet    Take 2 tablets (500 mg) the first day, then take 1 tablet (250 mg) by mouth daily for a total of 5 days.    ILD (interstitial lung disease) (H)       * blood glucose monitoring lancets     4 Box    Use to test blood sugar 4 times daily or as directed.    Type 2 diabetes mellitus without complication, without long-term current use of insulin (H)       * blood glucose monitoring lancets     1 Box    Use to test blood sugar 4 times daily or as directed.  Ok to substitute alternative if insurance prefers.    Type 2 diabetes mellitus without complication, without long-term current use of insulin (H)       * blood glucose monitoring test strip    no brand specified    300 strip    Use to test blood sugars 4 times daily or as directed    Type 2 diabetes mellitus without complication, without long-term current use of insulin (H)       * blood glucose monitoring test strip    ACCU-CHEK SHANNON PLUS    120 strip    Use to test blood sugar 4 times daily or as directed.  Ok to substitute alternative if insurance prefers.    Type 2 diabetes mellitus without complication, without long-term current use of insulin (H)       COMPRESSION STOCKINGS     2 each    Wear compression stockings in affected leg (right leg) or both legs most of the time during the day and take them off at night.    DVT (deep venous thrombosis), right, Postphlebitic syndrome, Chronic anticoagulation, Atrial  fibrillation (H)       escitalopram 10 MG tablet    LEXAPRO    30 tablet    Take 1 tablet (10 mg) by mouth daily    Major depressive disorder, recurrent episode, moderate (H)       fluticasone 220 MCG/ACT Inhaler    FLOVENT HFA    3 Inhaler    Inhale 2 puffs into the lungs 2 times daily    ILD (interstitial lung disease) (H), Follicular bronchiolitis (H)       fluticasone 50 MCG/ACT spray    FLONASE    16 g    Spray 1-2 sprays into both nostrils daily    Seasonal allergic rhinitis       furosemide 40 MG tablet    LASIX    180 tablet    Take 1 tablet (40 mg) by mouth 2 times daily    (HFpEF) heart failure with preserved ejection fraction (H)       * insulin pen needle 31G X 8 MM    B-D U/F    400 each    Use 4 times daily (with Lantus and Novolog) or as directed.    Type 2 diabetes mellitus without complication, without long-term current use of insulin (H)       * insulin pen needle 32G X 4 MM    BD JANE U/F    200 each    Use 4 daily as directed.    Type 2 diabetes mellitus without complication, without long-term current use of insulin (H)       ketoconazole 2 % cream    NIZORAL    60 g    Apply topically 2 times daily    Cutaneous candidiasis       LANTUS SOLOSTAR 100 UNIT/ML injection   Generic drug:  insulin glargine     15 mL    INJECT 25 UNTIS SUBCUTANEOUSLY EVERY DAY    Type 2 diabetes mellitus with hyperglycemia, with long-term current use of insulin (H)       lisinopril 2.5 MG tablet    PRINIVIL/Zestril    90 tablet    TAKE 1 TABLET (2.5 MG) BY MOUTH DAILY    Essential hypertension with goal blood pressure less than 140/90       metFORMIN 500 MG 24 hr tablet    GLUCOPHAGE-XR    180 tablet    TAKE 2 TABLETS (1,000 MG) BY MOUTH DAILY (WITH DINNER)    Type 2 diabetes mellitus without complication, without long-term current use of insulin (H)       metoprolol 100 MG 24 hr tablet    TOPROL-XL    90 tablet    Take 1 tablet (100 mg) by mouth daily    Atrial fibrillation with controlled ventricular response (H)        montelukast 10 MG tablet    SINGULAIR    90 tablet    Take 1 tablet (10 mg) by mouth At Bedtime    Sjogren's syndrome with lung involvement (H), ILD (interstitial lung disease) (H), Chronic seasonal allergic rhinitis due to other allergen       NovoLOG FLEXPEN 100 UNIT/ML injection   Generic drug:  insulin aspart     15 mL    INJECT 12 UNITS SUBCUTANEOUS 3 TIMES DAILY (WITH MEALS)    Type 2 diabetes mellitus with other specified complication (H)       order for DME     1 Device    Oxygen: Patient requires supplemental Oxygen 4 LPM via nasal canula with activity. Please provide patient with a home unit and with portability capability. Oxygen will be for a lifetime.    Hypoxia, ILD (interstitial lung disease) (H)       pantoprazole 20 MG EC tablet    PROTONIX    60 tablet    Take 1-2 tablets (20-40 mg) by mouth daily    Gastroesophageal reflux disease without esophagitis       PARoxetine 10 MG tablet    PAXIL    30 tablet    TAKE 1 TABLET (10MG) BY MOUTH AT BEDTIME    Major depressive disorder, recurrent episode, moderate (H)       polyethylene glycol Packet    MIRALAX/GLYCOLAX    7 packet    Take 17 g by mouth daily as needed for constipation    Constipation, unspecified constipation type       potassium chloride SA 20 MEQ CR tablet    K-DUR/KLOR-CON M    180 tablet    Take 2 tablets (40 mEq) by mouth daily    (HFpEF) heart failure with preserved ejection fraction (H)       predniSONE 10 MG tablet    DELTASONE    90 tablet    Take 2 tablets for three days (4/12-14), then take 1 tablet daily (10 mg daily starting 4/15)    ILD (interstitial lung disease) (H), Sjogren's syndrome (H)       spironolactone 25 MG tablet    ALDACTONE    180 tablet    Take 2 tablets (50 mg) by mouth daily    Chronic diastolic congestive heart failure (H)       traZODone 50 MG tablet    DESYREL    90 tablet    TAKE 1/2-1 TABLET BY MOUTH NIGHTLY AS NEEDED, CAN TITRATE DOWN TO 1/2TAB OR UP TO 2TABS AS NEEDED    Insomnia due to medical  condition       TYLENOL 500 MG tablet   Generic drug:  acetaminophen      Take 500 mg by mouth nightly as needed    Dizziness       warfarin 7.5 MG tablet    COUMADIN    100 tablet    Current dose is 7.5 mg daily.  Dose adjusted per INR result.    Atrial fibrillation with controlled ventricular response (H)       * Notice:  This list has 8 medication(s) that are the same as other medications prescribed for you. Read the directions carefully, and ask your doctor or other care provider to review them with you.

## 2017-11-08 NOTE — TELEPHONE ENCOUNTER
ANTICOAGULATION FOLLOW-UP CLINIC VISIT    Patient Name:  Betty Tee  Date:  11/8/2017  Contact Type:  Telephone    SUBJECTIVE:  Bleeding Signs/Symptoms: None  Thromboembolic Signs/Symptoms: None    Medication Changes:  No  Dietary Changes:  No  Bacterial/Viral Infection:  No    Missed Coumadin Doses:  None  Other Concerns:  No      ASSESSMENT/PLAN:  See: ANTICOAGULATION QIC flow sheet.    INR 1.8  Directions on 10/26/2017 were to decrease to 7.5mg daily except 5mg on Tues d/t SUPRA INR  Patient has only 7.5mg pills per med list (can't take 5mg on Tues with this strength of tablet)  She also noted she's been taking 7.5mg on white sheet attached to yellow INR packet  Assume she's been taking 7.5mg daily     Left detailed VM for patient  If she has been taking 7.5mg daily, needs to take 1.5 tabs tonight (11.25mg) then resume 7.5mg daily  Recheck INR 2 weeks    If above is incorrect, asked that patient call clinic back    ---If for some reason she was able to take 5mg Tue and 7.5mg ROW then needs to increase to 7.5mg daily and still recheck INR 2 weeks---  Lydia HALEY RN    Dosage adjustment made based on physician directed care plan.    JIMI VOGT

## 2017-11-08 NOTE — PROGRESS NOTES
SUBJECTIVE:   Betty Tee is a 77 year old female who presents to clinic today for the following health issues:    Depression and Anxiety Follow-Up    Status since last visit: No change    Other associated symptoms:None    Complicating factors:     Significant life event: No     Current substance abuse: None  PHQ-9  English  PHQ-9   Any Language  GAD7  Suicide Assessment Five-step Evaluation and Treatment (SAFE-T)      ----MDD- at last visit, discussed trial to avoid paxil due to potential se's, crissy after 65 yrs of age.  Pt has been on paxil for yrs, no known se's.  Agreed to try and change meds.  At last visit, switched from paxil to lexapro 10mg/d.  Pt and close friend here today doesn't think it's made much of a difference- feels fine on it.  No se's.    PHQ-9 SCORE 12/30/2016 7/5/2017 11/8/2017   Total Score - - -   Total Score MyChart - - -   Total Score 4 5 4     DELORES-7 SCORE 12/30/2016 11/8/2017   Total Score 6 4       ---Review of chart-- concerned about lymph node f/u.  Lymph node in R mandibular area has been 'present' for a few yrs.  Doesn't hurt, but pt can feel it, and it doesn't seem to change.  Doesn't normally hurt, unless really presses on the area.  Saw Dr. Polk (ENT) back in '15, bx done in '15- which is when she was dx with Sjogrens.    Reviewing chart today prior to appt- in 4/17, while pt was in hospital, she had CT head/neck angio done.  Findings-- no aneurysms or stenosis, but noted that the R suprahyoid neck lymphadenopathy was increased compared to 6/15.  No known discussion from hospital notes, no known f/u since.  Pt doesn't recall this, but her friend remembers mention that it was present, didn't recall saying it was increased in size.  ENT- Dr. Polk was who she saw in '15, and last in 4/16.  At that time, focus was more on lip bx f/u- rec f/u in a year, now over a year, will send for f/u and discuss lymph node issues.  Pt feels like it's still the same- just feels  it.      ---Pulmonary/Sjogrens/ILD- Pt has had two major bouts of upper respiratory infections.  Saw Dr. Walsh, who put her on two rounds of azithro with Dr. Walsh.  First time when seen, second over phone.  Since the abx, pt has been feeling much better, but just has a little bit of phlegm left.  Does still get really tired after eating, however.      --Elevated WBC- WBC up at 13.8 in late 5/17, then nl in 6/17, then high every time since 8/1/17 check.  12.0 on 8/1/17, 13.7 on 8/30/17, 11.6 on 9/12/17, and 11.9 on 10/6/17.  Now that she's been on abx, will check again today and see if improved.  Had been having steady wt loss, but now is gaining some over the past several months.  Does have night sweats.      --Cardiology/A.fib/heart failure  Pt states she normally can't 'feel' the a.fib, but thinks she could the two nights ago- felt heart was going so fast.  Occurred when she was in bed. Unsure of other sx's, maybe SOB- did try her CPAP machine, helped a bit, but it lasted most of the night. Towards morning, but her head back for a bit- thinks it went away.  No associated chest pain.  Hasn't come back.  No previous sx's.  She will discuss with cardiology.    She did have a recent visit with Cardiology.  10/26/17- cont current diuretics, including spironolactone, BP control.  Rec steroid pulse if wheezing/cough not improved.  These are improved.      --Rheum/Sjogren's- last visit on 10/19/17.  --Wanted her to go back down on prednisone, but pt had return of sx's at lower dose.  Currently at 10mg/d, was down 1mg/d weaned down slowly to 7mg/d, and felt horrible with jt pains/aches.  Could hardly walk.  Felt like the inflammatory joint sx's came right back.  --Did look at her dexa, worse compared to the previous two years.  Osteopenia range (-1.7 t-score).    Did give her a rx for fosamax.  Pt is very wary.  Hasn't started it yet.  Is at increased risk due to prednisone use and PPI use (since '11)      --GERD- on  protonix 20mg/d.   Had lower GI bleed in '16.  Never able to find a source- thought too complicated to scope.  Hgbs have been recovering, though.  Started in '11 when she was hospitalized for the colonic abscess/colostomy (colorectal Dr. Fritz)- likely just never stopped.  Was increased to 2x/day in 1/16 through pulmonary- noting that pt had heart burn, so advised incr qd to bid dosing.    --Flu shot- ready to have that done today.      Problem list and histories reviewed & adjusted, as indicated.  Additional history: as documented    Patient Active Problem List   Diagnosis     Temporomandibular joint disorder     Diverticulitis of colon     Disorder of bone and cartilage     Osteoarthritis     Alcohol abuse, in remission     Restless legs syndrome (RLS)     Major depressive disorder, recurrent episode, moderate     Essential hypertension with goal blood pressure less than 140/90     Sciatica     Advanced directives, counseling/discussion     Colouterine fistula     Atrial fibrillation with controlled ventricular response (H)     Left ventricular hypertrophy     Health Care Home     Insomnia     Joint pains     Allergic reaction caused by a drug - likely plaquenil     Breast fibroadenoma     DVT (deep venous thrombosis) (H)     Fatty liver disease, nonalcoholic     Rib pain     Neck mass     Gastroesophageal reflux disease without esophagitis     Enlarged lymph node     Sjogren's syndrome with lung involvement (H)     ANGELICA (obstructive sleep apnea)     ILD (interstitial lung disease) (H)     Lower GI bleed     Acute and chronic respiratory failure with hypoxia (H)     (HFpEF) heart failure with preserved ejection fraction (H)     Type 2 diabetes mellitus with hyperglycemia, with long-term current use of insulin (H)     Heart failure, chronic, with acute decompensation (H)     Hyperlipidemia LDL goal <100     Long term current use of anticoagulant therapy     Inflammatory arthritis      Current Outpatient  Prescriptions   Medication Sig Dispense Refill     NOVOLOG FLEXPEN 100 UNIT/ML soln INJECT 12 UNITS SUBCUTANEOUS 3 TIMES DAILY (WITH MEALS) 15 mL 1     azithromycin (ZITHROMAX) 250 MG tablet Take 2 tablets (500 mg) the first day, then take 1 tablet (250 mg) by mouth daily for a total of 5 days. 6 tablet 0     alendronate (FOSAMAX) 70 MG tablet Take 1 tablet (70 mg) by mouth every 7 days 4 tablet 11     lisinopril (PRINIVIL/ZESTRIL) 2.5 MG tablet TAKE 1 TABLET (2.5 MG) BY MOUTH DAILY 90 tablet 0     LANTUS SOLOSTAR 100 UNIT/ML soln INJECT 25 UNTIS SUBCUTANEOUSLY EVERY DAY 15 mL 0     atorvastatin (LIPITOR) 20 MG tablet Take 1 tablet (20 mg) by mouth daily 90 tablet 0     potassium chloride SA (K-DUR/KLOR-CON M) 20 MEQ CR tablet Take 2 tablets (40 mEq) by mouth daily 180 tablet 3     escitalopram (LEXAPRO) 10 MG tablet Take 1 tablet (10 mg) by mouth daily 30 tablet 1     azithromycin (ZITHROMAX) 250 MG tablet Two tablets first day, then one tablet daily for four days. 6 tablet 0     metFORMIN (GLUCOPHAGE-XR) 500 MG 24 hr tablet TAKE 2 TABLETS (1,000 MG) BY MOUTH DAILY (WITH DINNER) 180 tablet 0     furosemide (LASIX) 40 MG tablet Take 1 tablet (40 mg) by mouth 2 times daily 180 tablet 3     PARoxetine (PAXIL) 10 MG tablet TAKE 1 TABLET (10MG) BY MOUTH AT BEDTIME 30 tablet 1     montelukast (SINGULAIR) 10 MG tablet Take 1 tablet (10 mg) by mouth At Bedtime 90 tablet 1     warfarin (COUMADIN) 7.5 MG tablet Current dose is 7.5 mg daily.  Dose adjusted per INR result. 100 tablet 0     spironolactone (ALDACTONE) 25 MG tablet Take 2 tablets (50 mg) by mouth daily 180 tablet 3     traZODone (DESYREL) 50 MG tablet TAKE 1/2-1 TABLET BY MOUTH NIGHTLY AS NEEDED, CAN TITRATE DOWN TO 1/2TAB OR UP TO 2TABS AS NEEDED 90 tablet 1     blood glucose monitoring (NO BRAND SPECIFIED) test strip Use to test blood sugars 4 times daily or as directed 300 strip 3     blood glucose monitoring (ACCU-CHEK MULTICLIX) lancets Use to test blood  sugar 4 times daily or as directed. 4 Box 3     insulin pen needle (B-D U/F) 31G X 8 MM Use 4 times daily (with Lantus and Novolog) or as directed. 400 each 3     insulin pen needle (BD JANE U/F) 32G X 4 MM Use 4 daily as directed. 200 each 11     blood glucose monitoring (ACCU-CHEK SHANNON PLUS) test strip Use to test blood sugar 4 times daily or as directed.  Ok to substitute alternative if insurance prefers. 120 strip 11     blood glucose monitoring (ACCU-CHEK FASTCLIX) lancets Use to test blood sugar 4 times daily or as directed.  Ok to substitute alternative if insurance prefers. 1 Box 11     predniSONE (DELTASONE) 10 MG tablet Take 2 tablets for three days (4/12-14), then take 1 tablet daily (10 mg daily starting 4/15) 90 tablet 3     pantoprazole (PROTONIX) 20 MG EC tablet Take 1-2 tablets (20-40 mg) by mouth daily 60 tablet 1     ketoconazole (NIZORAL) 2 % cream Apply topically 2 times daily 60 g 1     metoprolol (TOPROL-XL) 100 MG 24 hr tablet Take 1 tablet (100 mg) by mouth daily 90 tablet 3     albuterol (PROAIR HFA, PROVENTIL HFA, VENTOLIN HFA) 108 (90 BASE) MCG/ACT inhaler Inhale 2 puffs into the lungs every 6 hours 1 Inhaler 3     order for DME Oxygen: Patient requires supplemental Oxygen 4 LPM via nasal canula with activity. Please provide patient with a home unit and with portability capability. Oxygen will be for a lifetime. 1 Device 0     polyethylene glycol (MIRALAX/GLYCOLAX) packet Take 17 g by mouth daily as needed for constipation 7 packet 0     acyclovir (ZOVIRAX) 5 % ointment Apply topically 6 times daily (Patient taking differently: Apply topically 6 times daily As needed for outbreaks) 15 g 3     fluticasone (FLOVENT HFA) 220 MCG/ACT inhaler Inhale 2 puffs into the lungs 2 times daily 3 Inhaler 3     acyclovir (ZOVIRAX) 400 MG tablet Take 400 mg by mouth See Admin Instructions 5 times daily as needed for outbreaks       fluticasone (FLONASE) 50 MCG/ACT nasal spray Spray 1-2 sprays into both  nostrils daily (Patient taking differently: Spray 1-2 sprays into both nostrils daily as needed for allergies ) 16 g 5     COMPRESSION STOCKINGS Wear compression stockings in affected leg (right leg) or both legs most of the time during the day and take them off at night. 2 each 2     acetaminophen (TYLENOL) 500 MG tablet Take 500 mg by mouth nightly as needed        order for DME Equipment being ordered: Full face mask for CPAP - size: F10 large 1 each 0     traZODone (DESYREL) 50 MG tablet TAKE 1/2-1 TABLET BY MOUTH NIGHTLY AS NEEDED, CAN TITRATE DOWN TO 1/2TAB OR UP TO 2TABS AS NEEDED 180 tablet 0     azithromycin (ZITHROMAX) 250 MG tablet Two tablets first day, then one tablet daily for four days. 6 tablet 0     Allergies   Allergen Reactions     Augmentin Nausea and Vomiting     Codeine Nausea and Vomiting     Phenobarbital Itching     Recent Labs   Lab Test  11/08/17   1432  10/26/17   1457  10/06/17   1421   08/01/17   1146  07/05/17   1233   06/22/17   1700   04/04/17   0650   04/02/17   2147   09/20/16   1045   08/06/14   1116   A1C   --    --   7.3*   --    --   6.4*   --    --    --   14.3*   --    --    < >   --    < >   --    LDL   --    --    --    --    --    --    --   13   --    --    --    --    --   56   --   59   HDL   --    --    --    --    --    --    --   62   --    --    --    --    --   72   --   57   TRIG   --    --    --    --    --    --    --   132   --    --    --    --    --   123   --   129   ALT   --    --   43   --   45   --    --    --    --    --    --   19   < >   --    < >  52*   CR   --   0.89  0.94   < >  0.90  0.82   < >  0.80   < >   --    < >  0.58   < >   --    < >  0.82   GFRESTIMATED   --   62  58*   < >  61  68   < >  69   < >   --    < >  >90  Non  GFR Calc     < >   --    < >  68   GFRESTBLACK   --   75  70   < >  74  82   < >  84   < >   --    < >  >90   GFR Calc     < >   --    < >  83   POTASSIUM   --   3.6  4.3   < >  3.5  3.7   <  ">  3.0*   < >   --    < >  3.3*   < >   --    < >  4.1   TSH  1.40   --    --    --    --    --    --    --    --    --    --    --    --    --    --    --     < > = values in this interval not displayed.      BP Readings from Last 3 Encounters:   11/08/17 100/62   10/26/17 113/76   10/19/17 139/85    Wt Readings from Last 3 Encounters:   11/08/17 207 lb (93.9 kg)   10/26/17 210 lb 4.8 oz (95.4 kg)   10/19/17 210 lb 1.6 oz (95.3 kg)            Labs reviewed in EPIC      Reviewed and updated as needed this visit by clinical staffTobacco  Allergies  Meds  Problems  Med Hx  Surg Hx  Fam Hx  Soc Hx        Reviewed and updated as needed this visit by Provider  Allergies  Meds  Problems         ROS:  Constitutional, HEENT, cardiovascular, pulmonary, gi and gu systems are negative, except as otherwise noted.      OBJECTIVE:   /62  Pulse 80  Temp 98.7  F (37.1  C) (Oral)  Resp 14  Ht 5' 7\" (1.702 m)  Wt 207 lb (93.9 kg)  SpO2 96%  Breastfeeding? No  BMI 32.42 kg/m2  Body mass index is 32.42 kg/(m^2).  GENERAL APPEARANCE: healthy, alert and no distress     EYES: PERRL, sclera clear     HENT: nose and mouth without ulcers or lesions     NECK: no adenopathy, no asymmetry, masses, or scars and thyroid normal to palpation     RESP: lungs clear to auscultation - no rales, rhonchi or wheezes     CV: irregularly irregular rhythm, normal rate, normal S1 S2, no S3 or S4 and no murmur, click or rub      Abdomen: soft, nontender, no HSM or masses and bowel sounds normal     Ext: warm, dry, trace edema in lower legs    Diagnostic Test Results:  Results for orders placed or performed in visit on 11/08/17   CBC with platelets and differential   Result Value Ref Range    WBC 15.6 (H) 4.0 - 11.0 10e9/L    RBC Count 4.59 3.8 - 5.2 10e12/L    Hemoglobin 13.4 11.7 - 15.7 g/dL    Hematocrit 42.1 35.0 - 47.0 %    MCV 92 78 - 100 fl    MCH 29.2 26.5 - 33.0 pg    MCHC 31.8 31.5 - 36.5 g/dL    RDW 16.7 (H) 10.0 - 15.0 %    " Platelet Count 264 150 - 450 10e9/L    Diff Method Automated Method     % Neutrophils 85.8 %    % Lymphocytes 6.9 %    % Monocytes 6.3 %    % Eosinophils 0.7 %    % Basophils 0.3 %    Absolute Neutrophil 13.4 (H) 1.6 - 8.3 10e9/L    Absolute Lymphocytes 1.1 0.8 - 5.3 10e9/L    Absolute Monocytes 1.0 0.0 - 1.3 10e9/L    Absolute Eosinophils 0.1 0.0 - 0.7 10e9/L    Absolute Basophils 0.1 0.0 - 0.2 10e9/L   TSH with free T4 reflex   Result Value Ref Range    TSH 1.40 0.40 - 4.00 mU/L     *Note: Due to a large number of results and/or encounters for the requested time period, some results have not been displayed. A complete set of results can be found in Results Review.       ASSESSMENT/PLAN:       ICD-10-CM    1. Major depressive disorder, recurrent episode, moderate F33.1    2. Enlarged lymph node R59.9 CBC with platelets and differential   3. Sjogren's syndrome with lung involvement (H) M35.02    4. ILD (interstitial lung disease) (H) J84.9    5. Leukocytosis, unspecified type D72.829 CBC with platelets and differential   6. Atrial fibrillation with controlled ventricular response (H) I48.91 INR point of care   7. (HFpEF) heart failure with preserved ejection fraction (H) I50.30    8. Inflammatory arthritis M19.90    9. Gastroesophageal reflux disease without esophagitis K21.9    10. Disorder of bone and cartilage M89.9     M94.9    11. Need for influenza vaccination Z23 FLU VACCINE, INCREASED ANTIGEN, PRESV FREE   12. Type 2 diabetes mellitus with hyperglycemia, with long-term current use of insulin (H) E11.65 TSH with free T4 reflex    Z79.4    13. Long term current use of anticoagulant therapy Z79.01      MDD- doing well with switch from paxil to lexpro- didn't really notice a difference (though seems in better spirits), no se's.  Switched due to concerns with paxil later in life, side effects.  Will cont on lexapro 10mg/d.    R mandibular lymph node- pt noted few yrs ago, eventually had bx done through   [facility], which lead to Sjogren's dx.  On review of chart prior to pt's appt today, reviewed 4/17 CT angio of neck, which stated there was increase in size of lymph node- had not been aware of this prior.  Pt is overdue for f/u with Dr. Polk- will make appt and discuss f/u.    Pulmonary- cough/wheezing/breathing improved with azithromycin courses x 2 from Dr. Walsh.  Cont f/u with Dr. Walsh.    Cardiology- pt did have symptomatic palpitations two nights ago- new for her, usually can't feel her a.fib. No return of sx's, normal exam other than irreg/irreg rhythm today.  Rec f/u with cardiology if sx's return.    Rheum- recent visit, pt did not tolerate weaning down prednisone dose attempt - jt pains quite bad at 7mg dose, now back at 10mg/d.  Dexa reviewed at appt, osteopenia with risk factors of prednisone and PPI use.  Rec fosamax.  Pt has not started yet, wary.  Discussed pros/cons concerns.  She will discuss again with rheumatology at 1/18 appt.    GERD- reviewed chart re: initiation of PPI.  Started in '11, while in hospital for GI abscess.  Continued.  Increased from QD to BID use in 1/16 at pulmonary office visit when it was noted that she had heart burn sx's on qd dosing.  Discussed issues with chronic PPI use.  She'll try slow wean down on PPI (currently taking once daily)- first skipping q3 days, then every other.  Would supplement with zantac/tums during wean.    Recheck CBC today.  WBC has been high- see if lower after azithro x 2.  Wt loss has stabilized, now gaining just slightly last several months.    INR recheck- see INR flowsheet.    Flu shot- high dose- risks/benefits discussed, given today.      RTC in ~2 months, sooner if any concerns or worsening sx's.    Ilene Tristan MD  [facility]    Spent greater than 50% of 40 minutes in face to face time counseling patient and coordinating care regarding the multiple issues as outlined above.

## 2017-11-09 LAB
DLCOUNC-%PRED-PRE: 78 %
DLCOUNC-PRE: 15.84 ML/MIN/MMHG
DLCOUNC-PRED: 20.3 ML/MIN/MMHG
ERV-%PRED-PRE: 76 %
ERV-PRE: 0.27 L
ERV-PRED: 0.35 L
EXPTIME-PRE: 5.71 SEC
FEF2575-%PRED-PRE: 81 %
FEF2575-PRE: 1.36 L/SEC
FEF2575-PRED: 1.68 L/SEC
FEFMAX-%PRED-PRE: 116 %
FEFMAX-PRE: 6.14 L/SEC
FEFMAX-PRED: 5.27 L/SEC
FEV1-%PRED-PRE: 67 %
FEV1-PRE: 1.42 L
FEV1FEV6-PRE: 80 %
FEV1FEV6-PRED: 78 %
FEV1FVC-PRE: 80 %
FEV1FVC-PRED: 77 %
FEV1SVC-PRE: 76 %
FEV1SVC-PRED: 68 %
FIFMAX-PRE: 3.37 L/SEC
FIO2-PRE: 21 %
FVC-%PRED-PRE: 64 %
FVC-PRE: 1.77 L
FVC-PRED: 2.76 L
IC-%PRED-PRE: 58 %
IC-PRE: 1.6 L
IC-PRED: 2.75 L
TSH SERPL DL<=0.005 MIU/L-ACNC: 1.4 MU/L (ref 0.4–4)
VA-%PRED-PRE: 60 %
VA-PRE: 3.21 L
VC-%PRED-PRE: 60 %
VC-PRE: 1.86 L
VC-PRED: 3.1 L

## 2017-11-09 ASSESSMENT — ANXIETY QUESTIONNAIRES: GAD7 TOTAL SCORE: 4

## 2017-11-10 ENCOUNTER — MYC MEDICAL ADVICE (OUTPATIENT)
Dept: FAMILY MEDICINE | Facility: CLINIC | Age: 77
End: 2017-11-10

## 2017-11-10 DIAGNOSIS — J20.9 ACUTE BRONCHITIS, UNSPECIFIED ORGANISM: Primary | ICD-10-CM

## 2017-11-10 DIAGNOSIS — D72.828 OTHER ELEVATED WHITE BLOOD CELL (WBC) COUNT: ICD-10-CM

## 2017-11-10 DIAGNOSIS — J84.9 ILD (INTERSTITIAL LUNG DISEASE) (H): ICD-10-CM

## 2017-11-10 RX ORDER — AZITHROMYCIN 250 MG/1
TABLET, FILM COATED ORAL
Qty: 6 TABLET | Refills: 0 | Status: SHIPPED | OUTPATIENT
Start: 2017-11-10 | End: 2018-01-31

## 2017-11-10 NOTE — TELEPHONE ENCOUNTER
Called and discussed increased WBC...  --Unsure why her WBC has increased, especially since her bronchitis sx's have improved.  Pt still states she is coughing up phlegm (thinks she's ~90% improved, but states 'it was a doozy', and it took two courses from Dr. Walsh to get her to this point).    -Glad she has a f/u appt with Dr. Polk set up in mid 12/17- would like to r/o that as a potential cause.  -Rec f/u here after that appointment, later December, and will recheck WBC.  Consider blood smear, hematology f/u if still abnl.    -In meantime, will do trial of third azithro txt to see if this completely clears her sx's (pt favors this option), and potentially helps the WBC.    FYI to triage as the azithro may affect her INR readings.  Thanks!  CW

## 2017-11-13 NOTE — TELEPHONE ENCOUNTER
Left VM on pt's cell  Also sent [employer] message  Need to recheck INR either today (Monday) or tomorrow (Tuesday) d/t maryan HALEY RN

## 2017-11-13 NOTE — TELEPHONE ENCOUNTER
Patient did not read [employer] message and has not called back yet  Called pt again - she will come in for INR tomorrow - appt scheduled  Lydia HALEY RN

## 2017-11-14 ENCOUNTER — TELEPHONE (OUTPATIENT)
Dept: FAMILY MEDICINE | Facility: CLINIC | Age: 77
End: 2017-11-14
Payer: MEDICARE

## 2017-11-14 DIAGNOSIS — G47.01 INSOMNIA DUE TO MEDICAL CONDITION: ICD-10-CM

## 2017-11-14 DIAGNOSIS — I48.91 ATRIAL FIBRILLATION WITH CONTROLLED VENTRICULAR RESPONSE (H): ICD-10-CM

## 2017-11-14 LAB — INR POINT OF CARE: 2.4 (ref 0.86–1.14)

## 2017-11-14 PROCEDURE — 99207 ZZC NO CHARGE NURSE ONLY: CPT | Performed by: FAMILY MEDICINE

## 2017-11-14 PROCEDURE — 36416 COLLJ CAPILLARY BLOOD SPEC: CPT | Performed by: FAMILY MEDICINE

## 2017-11-14 PROCEDURE — 85610 PROTHROMBIN TIME: CPT | Mod: QW | Performed by: FAMILY MEDICINE

## 2017-11-14 NOTE — TELEPHONE ENCOUNTER
Patient called back  States she saw she missed call from us today  Asked if she listened to VM - she said no just called us back  Gave her below instructions - she had no questions/concerns  Lydia HALEY RN

## 2017-11-14 NOTE — TELEPHONE ENCOUNTER
ANTICOAGULATION FOLLOW-UP CLINIC VISIT    Patient Name:  Betty Tee  Date:  11/14/2017  Contact Type:  Telephone    SUBJECTIVE:  Bleeding Signs/Symptoms: None  Thromboembolic Signs/Symptoms: None    Medication Changes:  No  Dietary Changes:  No  Bacterial/Viral Infection:  Yes: ZPak started 11/12/2017    Missed Coumadin Doses:  None  Other Concerns:  No      ASSESSMENT/PLAN:  See: ANTICOAGULATION QIC flow sheet.    INR 2.4  Continue 7.5mg daily   Recheck INR 2 weeks  Left detailed VM on pt's cell informing her of instructions  Asked that she callback with questions/conerns.  Lydia HALYE RN    Dosage adjustment made based on physician directed care plan.    JIMI VOGT

## 2017-11-15 RX ORDER — TRAZODONE HYDROCHLORIDE 50 MG/1
TABLET, FILM COATED ORAL
Qty: 180 TABLET | Refills: 0 | Status: SHIPPED | OUTPATIENT
Start: 2017-11-15 | End: 2018-11-28

## 2017-11-15 NOTE — TELEPHONE ENCOUNTER
Prescription approved per [facility] [place].  Lydia HALEY RN    Requested Prescriptions   Pending Prescriptions Disp Refills     traZODone (DESYREL) 50 MG tablet [Pharmacy Med Name: TRAZODONE 50 MG TABLET] 60 tablet 1     Sig: TAKE 1/2-1 TABLET BY MOUTH NIGHTLY AS NEEDED, CAN TITRATE DOWN TO 1/2TAB OR UP TO 2TABS AS NEEDED    Serotonin Modulators Passed    11/14/2017  2:17 PM       Passed - Recent or future visit with authorizing provider's specialty    Patient had office visit in the last year or has a visit in the next 30 days with authorizing provider.  See chart review.              Passed - Patient is age 18 or older       Passed - No active pregnancy on record       Passed - No positive pregnancy test in past 12 months

## 2017-11-20 ENCOUNTER — TELEPHONE (OUTPATIENT)
Dept: FAMILY MEDICINE | Facility: CLINIC | Age: 77
End: 2017-11-20

## 2017-11-20 DIAGNOSIS — G47.33 OSA (OBSTRUCTIVE SLEEP APNEA): Primary | ICD-10-CM

## 2017-11-20 NOTE — TELEPHONE ENCOUNTER
CW  Received call that patient needs Full face mask for her CPAP - size F10 large.  Order T'd up.  Triage will fax order when signed.  Thanks, Sophia Hemphill RN

## 2017-11-20 NOTE — TELEPHONE ENCOUNTER
Reason for Call: Request for an order or referral:    Order or referral being requested: Full face mask, F10 large     Date needed: as soon as possible    Has the patient been seen by the PCP for this problem? YES    Additional comments: Please fax to [phone]    Phone number Patient can be reached at:  Home number on file [phone] (home)    Best Time:  Anytime     Can we leave a detailed message on this number?  YES    Call taken on 11/20/2017 at 3:53 PM by Judi Gusman

## 2017-11-21 ENCOUNTER — MEDICAL CORRESPONDENCE (OUTPATIENT)
Dept: HEALTH INFORMATION MANAGEMENT | Facility: CLINIC | Age: 77
End: 2017-11-21

## 2017-11-21 NOTE — TELEPHONE ENCOUNTER
Order signed- will print and bring to triage.  Likely should be going through sleep specialists, though, so would transfer request there if any complications with this rx.  Thanks- CW

## 2017-11-21 NOTE — TELEPHONE ENCOUNTER
Patient informed.  Rx faxed to below # which pt states is for [facility]  Lydia HALEY RN

## 2017-12-02 DIAGNOSIS — E11.65 TYPE 2 DIABETES MELLITUS WITH HYPERGLYCEMIA, WITH LONG-TERM CURRENT USE OF INSULIN (H): ICD-10-CM

## 2017-12-02 DIAGNOSIS — Z79.4 TYPE 2 DIABETES MELLITUS WITH HYPERGLYCEMIA, WITH LONG-TERM CURRENT USE OF INSULIN (H): ICD-10-CM

## 2017-12-03 DIAGNOSIS — F33.1 MAJOR DEPRESSIVE DISORDER, RECURRENT EPISODE, MODERATE (H): ICD-10-CM

## 2017-12-04 RX ORDER — INSULIN GLARGINE 100 [IU]/ML
INJECTION, SOLUTION SUBCUTANEOUS
Qty: 15 ML | Refills: 0 | Status: SHIPPED | OUTPATIENT
Start: 2017-12-04 | End: 2018-01-31

## 2017-12-04 RX ORDER — ESCITALOPRAM OXALATE 10 MG/1
TABLET ORAL
Qty: 90 TABLET | Refills: 0 | Status: SHIPPED | OUTPATIENT
Start: 2017-12-04 | End: 2018-02-13

## 2017-12-04 NOTE — TELEPHONE ENCOUNTER
Prescription approved per [facility] Refill Protocol.  Lydia HALEY RN    Requested Prescriptions   Pending Prescriptions Disp Refills     LANTUS SOLOSTAR 100 UNIT/ML soln [Pharmacy Med Name: LANTUS SOLOSTAR 100 UNIT/ML]  0     Sig: INJECT 25 UNTIS SUBCUTANEOUSLY EVERY DAY    Long Acting Insulin Protocol Passed    12/2/2017 10:58 AM       Passed - Blood pressure less than 140/90 in past 6 months    BP Readings from Last 3 Encounters:   11/08/17 100/62   10/26/17 113/76   10/19/17 139/85                Passed - LDL on file in past 12 months    Recent Labs   Lab Test  06/22/17   1700   LDL  13            Passed - Microalbumin on file in past 12 months    Recent Labs   Lab Test  10/06/17   1422   MICROL  14   UMALCR  29.65*            Passed - Serum creatinine on file in past 12 months    Recent Labs   Lab Test  10/26/17   1457   CR  0.89            Passed - HgbA1C in past 3 or 6 months    Recent Labs   Lab Test  10/06/17   1421   A1C  7.3*            Passed - Patient is age 18 or older       Passed - Recent visit with authorizing provider's specialty in past 6 months    Patient had office visit in the last 6 months or has a visit in the next 30 days with authorizing provider.  See chart review.             Next 5 appointments (look out 90 days)     Jan 31, 2018  1:00 PM CST   Office Visit with Ilene Tristan MD   [facility] ([facility])    [address] [id number] [phone]

## 2017-12-04 NOTE — TELEPHONE ENCOUNTER
Pres[place] [facility] [place].  Lydia HALEY RN

## 2017-12-18 DIAGNOSIS — I48.91 ATRIAL FIBRILLATION WITH CONTROLLED VENTRICULAR RESPONSE (H): ICD-10-CM

## 2017-12-19 ENCOUNTER — OFFICE VISIT (OUTPATIENT)
Dept: OTOLARYNGOLOGY | Facility: CLINIC | Age: 77
End: 2017-12-19
Payer: MEDICARE

## 2017-12-19 ENCOUNTER — RADIANT APPOINTMENT (OUTPATIENT)
Dept: ULTRASOUND IMAGING | Facility: CLINIC | Age: 77
End: 2017-12-19
Attending: OTOLARYNGOLOGY
Payer: MEDICARE

## 2017-12-19 DIAGNOSIS — R59.1 LYMPHADENOPATHY: Primary | ICD-10-CM

## 2017-12-19 DIAGNOSIS — I48.91 ATRIAL FIBRILLATION WITH CONTROLLED VENTRICULAR RESPONSE (H): ICD-10-CM

## 2017-12-19 LAB — INR PPP: 2.78 (ref 0.86–1.14)

## 2017-12-19 ASSESSMENT — PAIN SCALES - GENERAL: PAINLEVEL: NO PAIN (0)

## 2017-12-19 NOTE — NURSING NOTE
Chief Complaint   Patient presents with     RECHECK     follow up neck nodule.      .Taqueria Acosta

## 2017-12-19 NOTE — PATIENT INSTRUCTIONS
The plan will be to get a ultra sound of the neck and then based on those results we will follow up.  Rakesh Pearson ,RN  [phone]

## 2017-12-19 NOTE — MR AVS SNAPSHOT
After Visit Summary   12/19/2017    Betty Tee    MRN: [id number]           Patient Information     Date Of Birth          1940        Visit Information        Provider Department      12/19/2017 2:15 PM Kirill Polk MD [facility] Ear Nose and Throat        Today's Diagnoses     Lymphadenopathy    -  1      Care Instructions    The plan will be to get a ultra sound of the neck and then based on those results we will follow up.  Rakesh Pearson ,RN  [phone]              Follow-ups after your visit        Your next 10 appointments already scheduled     Jan 18, 2018 10:00 AM CST   (Arrive by 9:45 AM)   US BIOPSY FINE NEEDLE ASPIRATION LYMPH NODE with UCUS3,  IMAGING NURSE,  NEURO RAD   [facility] US ([facility])    [address] [place] [id number] [phone]           Tell us in advance if there s any chance you may be pregnant.  Bring a list of your medicines to the exam. Include vitamins, minerals and over-the-counter drugs.  If you take blood thinners, you may need to stop taking them a few days before treatment. Talk to your doctor before stopping these medicines. You will need a blood test the morning of your exam.   Stop taking Coumadin (warfarin) 3 days before your exam. Restart the day after your exam.   If you take aspirin, you may need to stop taking it 3 days before your scan.   If you take Plavix, Ticlid, Pletal or Persantine, you may need to stop taking them 5 days before your scan. Please talk to your doctor before stopping these medicines.  If you will receive sedation for this test (medicine to help you relax):   See your family doctor for an exam within 30 days of treatment.   Plan for an adult to drive you home and stay with you for at least 6 hours.   Follow the eating and drinking guidelines checked below:   No eating or drinking for 4 hours before your test. You may take medicine with  small sips of water.   If you have diabetes:If you take insulin, call your diabetes care team. Do not take diabetes pills on the morning of your test. If you take metformin (Avandamet, Glucophage, Glucovance, Metaglip) and received contrast, wait 48 hours before re-starting this medicine.  Please call the Imaging Department at your exam site with any questions.            Jan 18, 2018  1:00 PM CST   (Arrive by 12:45 PM)   Return Visit with Carmenza Stringer MD   [facility] Rheumatology ([facility])    [address] [place] [address]  3rd [facility] [id number] [phone]            Jan 31, 2018  1:00 PM CST   Office Visit with Ilene Tristan MD   [facility] ([facility])    [address] [facility] [address] [phone]           Bring a current list of meds and any records pertaining to this visit. For Physicals, please bring immunization records and any forms needing to be filled out. Please arrive 10 minutes early to complete paperwork.            Apr 26, 2018  3:00 PM CDT   Lab with  LAB   [facility] Lab ([facility])    [address] [place] [id number] [phone]            Apr 26, 2018  3:30 PM CDT   (Arrive by 3:15 PM)   RETURN HEART FAILURE with Radha Rosa MD   [facility] Heart Care ([facility])    [address] [place] [address] [phone]              Who to contact     Please call your clinic at [phone] to:    Ask questions about your health    Make or cancel appointments    Discuss your medicines    Learn about your test results    Speak to your doctor   If you have compliments or concerns about an experience at your clinic, or if you wish to file a complaint, please contact [facility] Patient Relations at [phone] or email us at [email or web address]          Additional Information About Your Visit        [employer] Information     [employer] gives you secure access to your electronic health record. If you see a primary care provider, you can also send messages to your care team and make appointments. If you have questions, please call your primary care clinic.  If you do not have a primary care provider, please call [phone] and they will assist you.      [employer] is an electronic gateway that provides easy, online access to your medical records. With [employer], you can request a clinic appointment, read your test results, renew a prescription or communicate with your care team.     To access your existing account, please contact your [facility] or call [phone] for assistance.        Care EveryWhere ID     This is your Care EveryWhere ID. This could be used by other organizations to access your [facility] medical records  [id number]         Blood Pressure from Last 3 Encounters:   11/08/17 100/62   10/26/17 113/76   10/19/17 139/85    Weight from Last 3 Encounters:   11/08/17 93.9 kg (207 lb)   10/26/17 95.4 kg (210 lb 4.8 oz)   10/19/17 95.3 kg (210 lb 1.6 oz)              We Performed the Following     US Head Neck Soft Tissue          Today's Medication Changes          These changes are accurate as of: 12/19/17 11:59 PM.  If you have any questions, ask your nurse or doctor.               These medicines have changed or have updated prescriptions.        Dose/Directions    acyclovir 5 % ointment   Commonly known as:  ZOVIRAX   This may have changed:  additional instructions   Used for:  Recurrent cold sores        Apply topically 6 times daily   Quantity:  15 g   Refills:  3       fluticasone 50 MCG/ACT spray   Commonly known as:  FLONASE   This may have changed:    - when to take this  - reasons to take this   Used for:  Seasonal allergic rhinitis        Dose:  1-2 spray   Spray 1-2 sprays into both nostrils daily   Quantity:  16  g   Refills:  5                Primary Care Provider Office Phone # Fax #    Ilene Tristan [phone] [address] [address]        Equal Access to Services     GENNY STONER : Gilda marcum marlo meg Trammell, waaxda luqadaha, qaybta kaalmada adestasyada, anderson jain laKatelynsanket shaikh. So [facility] [phone].    ATENCIÓN: Si habla español, tiene a conti disposición servicios gratuitos de asistencia lingüística. Llame al [phone].    We comply with applicable federal civil rights laws and [place] laws. We do not discriminate on the basis of race, color, national origin, age, disability, sex, sexual orientation, or gender identity.            Thank you!     Thank you for choosing [facility] EAR NOSE [facility] TH[facility]AT  for your care. Our goal is always to provide you with excellent care. Hearing back from our patients is one way we can continue to improve our services. Please take a few minutes to complete the written survey that you may receive in the mail after your visit with us. Thank you!             Your Updated Medication List - Protect others around you: Learn how to safely use, store and throw away your medicines at www.disposemymeds.org.          This list is accurate as of: 12/19/17 11:59 PM.  Always use your most recent med list.                   Brand Name Dispense Instructions for use Diagnosis    acyclovir 400 MG tablet    ZOVIRAX     Take 400 mg by mouth See Admin Instructions 5 times daily as needed for outbreaks        acyclovir 5 % ointment    ZOVIRAX    15 g    Apply topically 6 times daily    Recurrent cold sores       albuterol 108 (90 BASE) MCG/ACT Inhaler    PROAIR HFA/PROVENTIL HFA/VENTOLIN HFA    1 Inhaler    Inhale 2 puffs into the lungs every 6 hours    ILD (interstitial lung disease) (H)       alendronate 70 MG tablet    FOSAMAX    4 tablet    Take 1 tablet (70 mg) by mouth every 7 days    Other osteoporosis without current  pathological fracture       atorvastatin 20 MG tablet    LIPITOR    90 tablet    Take 1 tablet (20 mg) by mouth daily    Hyperlipidemia LDL goal <100       * azithromycin 250 MG tablet    ZITHROMAX    6 tablet    Two tablets first day, then one tablet daily for four days.    Acute recurrent maxillary sinusitis       * azithromycin 250 MG tablet    ZITHROMAX    6 tablet    Take 2 tablets (500 mg) the first day, then take 1 tablet (250 mg) by mouth daily for a total of 5 days.    ILD (interstitial lung disease) (H)       * azithromycin 250 MG tablet    ZITHROMAX    6 tablet    Two tablets first day, then one tablet daily for four days.    Acute bronchitis, unspecified organism, Other elevated white blood cell (WBC) count, ILD (interstitial lung disease) (H)       * blood glucose monitoring lancets     4 Box    Use to test blood sugar 4 times daily or as directed.    Type 2 diabetes mellitus without complication, without long-term current use of insulin (H)       * blood glucose monitoring lancets     1 Box    Use to test blood sugar 4 times daily or as directed.  Ok to substitute alternative if insurance prefers.    Type 2 diabetes mellitus without complication, without long-term current use of insulin (H)       * blood glucose monitoring test strip    no brand specified    300 strip    Use to test blood sugars 4 times daily or as directed    Type 2 diabetes mellitus without complication, without long-term current use of insulin (H)       * blood glucose monitoring test strip    ACCU-CHEK SHANNON PLUS    120 strip    Use to test blood sugar 4 times daily or as directed.  Ok to substitute alternative if insurance prefers.    Type 2 diabetes mellitus without complication, without long-term current use of insulin (H)       COMPRESSION STOCKINGS     2 each    Wear compression stockings in affected leg (right leg) or both legs most of the time during the day and take them off at night.    DVT (deep venous thrombosis), right,  Postphlebitic syndrome, Chronic anticoagulation, Atrial fibrillation (H)       escitalopram 10 MG tablet    LEXAPRO    90 tablet    TAKE 1 TABLET (10 MG) BY MOUTH DAILY    Major depressive disorder, recurrent episode, moderate (H)       fluticasone 220 MCG/ACT Inhaler    FLOVENT HFA    3 Inhaler    Inhale 2 puffs into the lungs 2 times daily    ILD (interstitial lung disease) (H), Follicular bronchiolitis (H)       fluticasone 50 MCG/ACT spray    FLONASE    16 g    Spray 1-2 sprays into both nostrils daily    Seasonal allergic rhinitis       furosemide 40 MG tablet    LASIX    180 tablet    Take 1 tablet (40 mg) by mouth 2 times daily    (HFpEF) heart failure with preserved ejection fraction (H)       * insulin pen needle 31G X 8 MM    B-D U/F    400 each    Use 4 times daily (with Lantus and Novolog) or as directed.    Type 2 diabetes mellitus without complication, without long-term current use of insulin (H)       * insulin pen needle 32G X 4 MM    BD JANE U/F    200 each    Use 4 daily as directed.    Type 2 diabetes mellitus without complication, without long-term current use of insulin (H)       ketoconazole 2 % cream    NIZORAL    60 g    Apply topically 2 times daily    Cutaneous candidiasis       LANTUS SOLOSTAR 100 UNIT/ML injection   Generic drug:  insulin glargine     15 mL    INJECT 25 UNTIS SUBCUTANEOUSLY EVERY DAY    Type 2 diabetes mellitus with hyperglycemia, with long-term current use of insulin (H)       lisinopril 2.5 MG tablet    PRINIVIL/Zestril    90 tablet    TAKE 1 TABLET (2.5 MG) BY MOUTH DAILY    Essential hypertension with goal blood pressure less than 140/90       metoprolol 100 MG 24 hr tablet    TOPROL-XL    90 tablet    Take 1 tablet (100 mg) by mouth daily    Atrial fibrillation with controlled ventricular response (H)       montelukast 10 MG tablet    SINGULAIR    90 tablet    Take 1 tablet (10 mg) by mouth At Bedtime    Sjogren's syndrome with lung involvement (H), ILD (interstitial  lung disease) (H), Chronic seasonal allergic rhinitis due to other allergen       NovoLOG FLEXPEN 100 UNIT/ML injection   Generic drug:  insulin aspart     15 mL    INJECT 12 UNITS SUBCUTANEOUS 3 TIMES DAILY (WITH MEALS)    Type 2 diabetes mellitus with other specified complication (H)       * order for DME     1 Device    Oxygen: Patient requires supplemental Oxygen 4 LPM via nasal canula with activity. Please provide patient with a home unit and with portability capability. Oxygen will be for a lifetime.    Hypoxia, ILD (interstitial lung disease) (H)       * order for DME     1 each    Equipment being ordered: Full face mask for CPAP - size: F10 large    ANGELICA (obstructive sleep apnea)       PARoxetine 10 MG tablet    PAXIL    30 tablet    TAKE 1 TABLET (10MG) BY MOUTH AT BEDTIME    Major depressive disorder, recurrent episode, moderate (H)       polyethylene glycol Packet    MIRALAX/GLYCOLAX    7 packet    Take 17 g by mouth daily as needed for constipation    Constipation, unspecified constipation type       potassium chloride SA 20 MEQ CR tablet    K-DUR/KLOR-CON M    180 tablet    Take 2 tablets (40 mEq) by mouth daily    (HFpEF) heart failure with preserved ejection fraction (H)       predniSONE 10 MG tablet    DELTASONE    90 tablet    Take 2 tablets for three days (4/12-14), then take 1 tablet daily (10 mg daily starting 4/15)    ILD (interstitial lung disease) (H), Sjogren's syndrome (H)       spironolactone 25 MG tablet    ALDACTONE    180 tablet    Take 2 tablets (50 mg) by mouth daily    Chronic diastolic congestive heart failure (H)       * traZODone 50 MG tablet    DESYREL    90 tablet    TAKE 1/2-1 TABLET BY MOUTH NIGHTLY AS NEEDED, CAN TITRATE DOWN TO 1/2TAB OR UP TO 2TABS AS NEEDED    Insomnia due to medical condition       * traZODone 50 MG tablet    DESYREL    180 tablet    TAKE 1/2-1 TABLET BY MOUTH NIGHTLY AS NEEDED, CAN TITRATE DOWN TO 1/2TAB OR UP TO 2TABS AS NEEDED    Insomnia due to medical  condition       TYLENOL 500 MG tablet   Generic drug:  acetaminophen      Take 500 mg by mouth nightly as needed    Dizziness       warfarin 7.5 MG tablet    COUMADIN    90 tablet    TAKE 1 TABLET BY MOUTH DAILY. CURRENT DOSE IS 7.5 MG DAILY. DOSE ADJUSTED PER INR RESULT.    Atrial fibrillation with controlled ventricular response (H)       * Notice:  This list has 13 medication(s) that are the same as other medications prescribed for you. Read the directions carefully, and ask your doctor or other care provider to review them with you.

## 2017-12-19 NOTE — LETTER
12/19/2017       RE: Betty Tee  [address]  [facility] [address]     Dear Colleague,    Thank you for referring your patient, Betty Tee, to the [facility] EAR NOSE AND THROAT at [facility]. Please see a copy of my visit note below.    Dear Dr. Jimenez:    I had the pleasure of meeting Betty Tee in consultation today at the [facility] at your request.     History of Present Illness:         Patient has lymph node type mass in the right neck non tender no exacerbating or alleviating features no antecedent problems.       MEDICATIONS:     Current Outpatient Prescriptions   Medication Sig Dispense Refill     metFORMIN (GLUCOPHAGE-XR) 500 MG 24 hr tablet TAKE 2 TABLETS BY MOUTH DAILY WITH DINNER 180 tablet 0     pantoprazole (PROTONIX) 20 MG EC tablet Take 1-2 tablets (20-40 mg) by mouth daily 90 tablet 0     warfarin (COUMADIN) 7.5 MG tablet TAKE 1 TABLET BY MOUTH DAILY. CURRENT DOSE IS 7.5 MG DAILY. DOSE ADJUSTED PER INR RESULT. 90 tablet 0     LANTUS SOLOSTAR 100 UNIT/ML soln INJECT 25 UNTIS SUBCUTANEOUSLY EVERY DAY 15 mL 0     escitalopram (LEXAPRO) 10 MG tablet TAKE 1 TABLET (10 MG) BY MOUTH DAILY 90 tablet 0     order for DME Equipment being ordered: Full face mask for CPAP - size: F10 large 1 each 0     traZODone (DESYREL) 50 MG tablet TAKE 1/2-1 TABLET BY MOUTH NIGHTLY AS NEEDED, CAN TITRATE DOWN TO 1/2TAB OR UP TO 2TABS AS NEEDED 180 tablet 0     azithromycin (ZITHROMAX) 250 MG tablet Two tablets first day, then one tablet daily for four days. 6 tablet 0     NOVOLOG FLEXPEN 100 UNIT/ML soln INJECT 12 UNITS SUBCUTANEOUS 3 TIMES DAILY (WITH MEALS) 15 mL 1     azithromycin (ZITHROMAX) 250 MG tablet Take 2 tablets (500 mg) the first day, then take 1 tablet (250 mg) by mouth daily for a total of 5 days. 6 tablet 0     alendronate (FOSAMAX) 70 MG tablet Take 1 tablet (70 mg) by mouth every 7 days 4 tablet 11      lisinopril (PRINIVIL/ZESTRIL) 2.5 MG tablet TAKE 1 TABLET (2.5 MG) BY MOUTH DAILY 90 tablet 0     atorvastatin (LIPITOR) 20 MG tablet Take 1 tablet (20 mg) by mouth daily 90 tablet 0     potassium chloride SA (K-DUR/KLOR-CON M) 20 MEQ CR tablet Take 2 tablets (40 mEq) by mouth daily 180 tablet 3     azithromycin (ZITHROMAX) 250 MG tablet Two tablets first day, then one tablet daily for four days. 6 tablet 0     furosemide (LASIX) 40 MG tablet Take 1 tablet (40 mg) by mouth 2 times daily 180 tablet 3     PARoxetine (PAXIL) 10 MG tablet TAKE 1 TABLET (10MG) BY MOUTH AT BEDTIME 30 tablet 1     montelukast (SINGULAIR) 10 MG tablet Take 1 tablet (10 mg) by mouth At Bedtime 90 tablet 1     spironolactone (ALDACTONE) 25 MG tablet Take 2 tablets (50 mg) by mouth daily 180 tablet 3     traZODone (DESYREL) 50 MG tablet TAKE 1/2-1 TABLET BY MOUTH NIGHTLY AS NEEDED, CAN TITRATE DOWN TO 1/2TAB OR UP TO 2TABS AS NEEDED 90 tablet 1     blood glucose monitoring (NO BRAND SPECIFIED) test strip Use to test blood sugars 4 times daily or as directed 300 strip 3     blood glucose monitoring (ACCU-CHEK MULTICLIX) lancets Use to test blood sugar 4 times daily or as directed. 4 Box 3     insulin pen needle (B-D U/F) 31G X 8 MM Use 4 times daily (with Lantus and Novolog) or as directed. 400 each 3     insulin pen needle (BD JANE U/F) 32G X 4 MM Use 4 daily as directed. 200 each 11     blood glucose monitoring (ACCU-CHEK SHANNON PLUS) test strip Use to test blood sugar 4 times daily or as directed.  Ok to substitute alternative if insurance prefers. 120 strip 11     blood glucose monitoring (ACCU-CHEK FASTCLIX) lancets Use to test blood sugar 4 times daily or as directed.  Ok to substitute alternative if insurance prefers. 1 Box 11     predniSONE (DELTASONE) 10 MG tablet Take 2 tablets for three days (4/12-14), then take 1 tablet daily (10 mg daily starting 4/15) 90 tablet 3     ketoconazole (NIZORAL) 2 % cream Apply topically 2 times daily 60  g 1     metoprolol (TOPROL-XL) 100 MG 24 hr tablet Take 1 tablet (100 mg) by mouth daily 90 tablet 3     albuterol (PROAIR HFA, PROVENTIL HFA, VENTOLIN HFA) 108 (90 BASE) MCG/ACT inhaler Inhale 2 puffs into the lungs every 6 hours 1 Inhaler 3     order for DME Oxygen: Patient requires supplemental Oxygen 4 LPM via nasal canula with activity. Please provide patient with a home unit and with portability capability. Oxygen will be for a lifetime. 1 Device 0     polyethylene glycol (MIRALAX/GLYCOLAX) packet Take 17 g by mouth daily as needed for constipation 7 packet 0     acyclovir (ZOVIRAX) 5 % ointment Apply topically 6 times daily (Patient taking differently: Apply topically 6 times daily As needed for outbreaks) 15 g 3     fluticasone (FLOVENT HFA) 220 MCG/ACT inhaler Inhale 2 puffs into the lungs 2 times daily 3 Inhaler 3     acyclovir (ZOVIRAX) 400 MG tablet Take 400 mg by mouth See Admin Instructions 5 times daily as needed for outbreaks       fluticasone (FLONASE) 50 MCG/ACT nasal spray Spray 1-2 sprays into both nostrils daily (Patient taking differently: Spray 1-2 sprays into both nostrils daily as needed for allergies ) 16 g 5     COMPRESSION STOCKINGS Wear compression stockings in affected leg (right leg) or both legs most of the time during the day and take them off at night. 2 each 2     acetaminophen (TYLENOL) 500 MG tablet Take 500 mg by mouth nightly as needed          ALLERGIES:    Allergies   Allergen Reactions     Augmentin Nausea and Vomiting     Codeine Nausea and Vomiting     Phenobarbital Itching       HABITS/SOCIAL HISTORY: no smoking or excessive etoh    PAST MEDICAL HISTORY:   Past Medical History:   Diagnosis Date     Alcohol abuse, in remission      Allergic rhinitis, cause unspecified     allegra helps when she takes it     Antiplatelet or antithrombotic long-term use      Atrial fibrillation (H)     in hosp in 11/11 after surgery w/ fluid overload     Cardiomegaly     LVH on stress echo-  cardiac w/u at [facility] ER- neg CT scan for PE, neg stress echo in 8/06     Chest pain, unspecified      Disorder of bone and cartilage, unspecified     osteopenia (had been on prempro), improved on 6/06 dexa, stable dexa 11/10     Diverticulosis of colon (without mention of hemorrhage)     last episode yrs ago     Essential hypertension, benign      Gastro-oesophageal reflux disease      Insomnia, unspecified     weaned off clonazepam     Irregular heart beat      Lumbago 7/09    MRI with [facility]AL, now seeing Dr. Cain for sciatic sx's     Major depressive disorder, recurrent episode, moderate (H)      Obstructive sleep apnea      Osteoarthrosis, unspecified whether generalized or localized, unspecified site      Sjogren's syndrome (H)      Sleep apnea      Tobacco use disorder     chantix in 9/07, started again in 6/08, working        FAMILY HISTORY:    Family History   Problem Relation Age of Onset     C.A.D. Mother 63     MI- first at age 63     HEART DISEASE Mother      Hypertension Mother      CEREBROVASCULAR DISEASE Mother      Hyperlipidemia Mother      Alcohol/Drug Father      Alzheimer Disease Father      Dementia Father      Hypertension Father      Hyperlipidemia Father      C.A.D. Sister 52     Minor MI- age 50's     HEART DISEASE Sister      Hypertension Sister      Hypertension Sister      Hypertension Brother      Cancer - colorectal Sister 48     Late 40's early 50's     Prostate Cancer Brother 74     Dx'd age 74     GASTROINTESTINAL DISEASE Sister      Diverticulitis     GASTROINTESTINAL DISEASE Brother      Diverticulitis     Lipids Sister      Lipids Sister      Parkinsonism Brother      DIABETES Sister      HEART DISEASE Sister      CHF     CANCER Sister      lung, smoker     Substance Abuse Sister      Substance Abuse Brother      Asthma Sister      CANCER Sister      Breast Cancer Daughter      Prostate Cancer Brother      Hyperlipidemia Brother        REVIEW OF SYSTEMS:  12 point ROS was negative  other than the symptoms noted above in the HPI.    PHYSICAL EXAMINATION: PHYSICAL EXAMINATION:    Constitutional:  The patient was unaccompanied, well-groomed, and in no acute distress.    Skin:  Warm and pink.    Neurologic:  Alert and oriented x 3.  CN's III-XII within normal limits.  Voice normal.   Psychiatric:  The patient's affect was calm, cooperative, and appropriate.    Respiratory:  Breathing comfortably without stridor or exertion of accessory muscles.    Eyes: Extraocular movement intact.    Head:  Normocephalic and atraumatic.  No lesions or scars.    Ears:  Pinnae and tragus non-tender.  EAC's and TM's were clear.     Nose:  Sinuses were non-tender.  Anterior rhinoscopy revealed midline septum and absence of purulence or polyps.    OC/OP:  Normal tongue, floor of mouth, buccal mucosa, and palate.  No lesions or masses on inspection or palpation.  No abnormal lymph tissue in the oropharynx.  The pterygoid region is non-tender.    HP/LX:  Mirror exam of the larynx reveals a sharp epiglottis and mobile arytenoids.  No pooling seen.  The cords themselves appear clear and mobile.   Neck:  Supple with normal laryngeal and tracheal landmarks.  The parotid beds were without masses.  No palpable thyroid.  Lymphatic:  There is right rubbery  Ln in the right neck.               IMPRESSION AND PLAN: will get US and perhaps biopsy     Thank you very much for the opportunity to participate in the care of your patient.      Kirill Polk M.D.  Otolaryngology- Head & Neck Surgery  [phone]      Dear Dr. Jimenez:    I had the pleasure of meeting Betty Tee in consultation today at the [facility] at your request.     History of Present Illness:               MEDICATIONS:     Current Outpatient Prescriptions   Medication Sig Dispense Refill     metFORMIN (GLUCOPHAGE-XR) 500 MG 24 hr tablet TAKE 2 TABLETS BY MOUTH DAILY WITH DINNER 180 tablet 0     pantoprazole (PROTONIX)  20 MG EC tablet Take 1-2 tablets (20-40 mg) by mouth daily 90 tablet 0     warfarin (COUMADIN) 7.5 MG tablet TAKE 1 TABLET BY MOUTH DAILY. CURRENT DOSE IS 7.5 MG DAILY. DOSE ADJUSTED PER INR RESULT. 90 tablet 0     LANTUS SOLOSTAR 100 UNIT/ML soln INJECT 25 UNTIS SUBCUTANEOUSLY EVERY DAY 15 mL 0     escitalopram (LEXAPRO) 10 MG tablet TAKE 1 TABLET (10 MG) BY MOUTH DAILY 90 tablet 0     order for DME Equipment being ordered: Full face mask for CPAP - size: F10 large 1 each 0     traZODone (DESYREL) 50 MG tablet TAKE 1/2-1 TABLET BY MOUTH NIGHTLY AS NEEDED, CAN TITRATE DOWN TO 1/2TAB OR UP TO 2TABS AS NEEDED 180 tablet 0     azithromycin (ZITHROMAX) 250 MG tablet Two tablets first day, then one tablet daily for four days. 6 tablet 0     NOVOLOG FLEXPEN 100 UNIT/ML soln INJECT 12 UNITS SUBCUTANEOUS 3 TIMES DAILY (WITH MEALS) 15 mL 1     azithromycin (ZITHROMAX) 250 MG tablet Take 2 tablets (500 mg) the first day, then take 1 tablet (250 mg) by mouth daily for a total of 5 days. 6 tablet 0     alendronate (FOSAMAX) 70 MG tablet Take 1 tablet (70 mg) by mouth every 7 days 4 tablet 11     lisinopril (PRINIVIL/ZESTRIL) 2.5 MG tablet TAKE 1 TABLET (2.5 MG) BY MOUTH DAILY 90 tablet 0     atorvastatin (LIPITOR) 20 MG tablet Take 1 tablet (20 mg) by mouth daily 90 tablet 0     potassium chloride SA (K-DUR/KLOR-CON M) 20 MEQ CR tablet Take 2 tablets (40 mEq) by mouth daily 180 tablet 3     azithromycin (ZITHROMAX) 250 MG tablet Two tablets first day, then one tablet daily for four days. 6 tablet 0     furosemide (LASIX) 40 MG tablet Take 1 tablet (40 mg) by mouth 2 times daily 180 tablet 3     PARoxetine (PAXIL) 10 MG tablet TAKE 1 TABLET (10MG) BY MOUTH AT BEDTIME 30 tablet 1     montelukast (SINGULAIR) 10 MG tablet Take 1 tablet (10 mg) by mouth At Bedtime 90 tablet 1     spironolactone (ALDACTONE) 25 MG tablet Take 2 tablets (50 mg) by mouth daily 180 tablet 3     traZODone (DESYREL) 50 MG tablet TAKE 1/2-1 TABLET BY MOUTH  NIGHTLY AS NEEDED, CAN TITRATE DOWN TO 1/2TAB OR UP TO 2TABS AS NEEDED 90 tablet 1     blood glucose monitoring (NO BRAND SPECIFIED) test strip Use to test blood sugars 4 times daily or as directed 300 strip 3     blood glucose monitoring (ACCU-CHEK MULTICLIX) lancets Use to test blood sugar 4 times daily or as directed. 4 Box 3     insulin pen needle (B-D U/F) 31G X 8 MM Use 4 times daily (with Lantus and Novolog) or as directed. 400 each 3     insulin pen needle (BD JANE U/F) 32G X 4 MM Use 4 daily as directed. 200 each 11     blood glucose monitoring (ACCU-CHEK SHANNON PLUS) test strip Use to test blood sugar 4 times daily or as directed.  Ok to substitute alternative if insurance prefers. 120 strip 11     blood glucose monitoring (ACCU-CHEK FASTCLIX) lancets Use to test blood sugar 4 times daily or as directed.  Ok to substitute alternative if insurance prefers. 1 Box 11     predniSONE (DELTASONE) 10 MG tablet Take 2 tablets for three days (4/12-14), then take 1 tablet daily (10 mg daily starting 4/15) 90 tablet 3     ketoconazole (NIZORAL) 2 % cream Apply topically 2 times daily 60 g 1     metoprolol (TOPROL-XL) 100 MG 24 hr tablet Take 1 tablet (100 mg) by mouth daily 90 tablet 3     albuterol (PROAIR HFA, PROVENTIL HFA, VENTOLIN HFA) 108 (90 BASE) MCG/ACT inhaler Inhale 2 puffs into the lungs every 6 hours 1 Inhaler 3     order for DME Oxygen: Patient requires supplemental Oxygen 4 LPM via nasal canula with activity. Please provide patient with a home unit and with portability capability. Oxygen will be for a lifetime. 1 Device 0     polyethylene glycol (MIRALAX/GLYCOLAX) packet Take 17 g by mouth daily as needed for constipation 7 packet 0     acyclovir (ZOVIRAX) 5 % ointment Apply topically 6 times daily (Patient taking differently: Apply topically 6 times daily As needed for outbreaks) 15 g 3     fluticasone (FLOVENT HFA) 220 MCG/ACT inhaler Inhale 2 puffs into the lungs 2 times daily 3 Inhaler 3      acyclovir (ZOVIRAX) 400 MG tablet Take 400 mg by mouth See Admin Instructions 5 times daily as needed for outbreaks       fluticasone (FLONASE) 50 MCG/ACT nasal spray Spray 1-2 sprays into both nostrils daily (Patient taking differently: Spray 1-2 sprays into both nostrils daily as needed for allergies ) 16 g 5     COMPRESSION STOCKINGS Wear compression stockings in affected leg (right leg) or both legs most of the time during the day and take them off at night. 2 each 2     acetaminophen (TYLENOL) 500 MG tablet Take 500 mg by mouth nightly as needed          ALLERGIES:    Allergies   Allergen Reactions     Augmentin Nausea and Vomiting     Codeine Nausea and Vomiting     Phenobarbital Itching       HABITS/SOCIAL HISTORY: no smoking     PAST MEDICAL HISTORY:   Past Medical History:   Diagnosis Date     Alcohol abuse, in remission      Allergic rhinitis, cause unspecified     allegra helps when she takes it     Antiplatelet or antithrombotic long-term use      Atrial fibrillation (H)     in hosp in 11/11 after surgery w/ fluid overload     Cardiomegaly     LVH on stress echo- cardiac w/u at [facility] ER- neg CT scan for PE, neg stress echo in 8/06     Chest pain, unspecified      Disorder of bone and cartilage, unspecified     osteopenia (had been on prempro), improved on 6/06 dexa, stable dexa 11/10     Diverticulosis of colon (without mention of hemorrhage)     last episode yrs ago     Essential hypertension, benign      Gastro-oesophageal reflux disease      Insomnia, unspecified     weaned off clonazepam     Irregular heart beat      Lumbago 7/09    MRI with [facility], now seeing Dr. Cain for sciatic sx's     Major depressive disorder, recurrent episode, moderate (H)      Obstructive sleep apnea      Osteoarthrosis, unspecified whether generalized or localized, unspecified site      Sjogren's syndrome (H)      Sleep apnea      Tobacco use disorder     chantix in 9/07, started again in 6/08, working        FAMILY HISTORY:     Family History   Problem Relation Age of Onset     C.A.D. Mother 63     MI- first at age 63     HEART DISEASE Mother      Hypertension Mother      CEREBROVASCULAR DISEASE Mother      Hyperlipidemia Mother      Alcohol/Drug Father      Alzheimer Disease Father      Dementia Father      Hypertension Father      Hyperlipidemia Father      C.A.D. Sister 52     Minor MI- age 50's     HEART DISEASE Sister      Hypertension Sister      Hypertension Sister      Hypertension Brother      Cancer - colorectal Sister 48     Late 40's early 50's     Prostate Cancer Brother 74     Dx'd age 74     GASTROINTESTINAL DISEASE Sister      Diverticulitis     GASTROINTESTINAL DISEASE Brother      Diverticulitis     Lipids Sister      Lipids Sister      Parkinsonism Brother      DIABETES Sister      HEART DISEASE Sister      CHF     CANCER Sister      lung, smoker     Substance Abuse Sister      Substance Abuse Brother      Asthma Sister      CANCER Sister      Breast Cancer Daughter      Prostate Cancer Brother      Hyperlipidemia Brother        REVIEW OF SYSTEMS:  12 point ROS was negative other than the symptoms noted above in the HPI.    PHYSICAL EXAMINATION: PHYSICAL EXAMINATION:    Constitutional:  The patient was unaccompanied, well-groomed, and in no acute distress.    Skin:  Warm and pink.    Neurologic:  Alert and oriented x 3.  CN's III-XII within normal limits.  Voice normal.   Psychiatric:  The patient's affect was calm, cooperative, and appropriate.    Respiratory:  Breathing comfortably without stridor or exertion of accessory muscles.    Eyes: Pupils were equal and reactive.  Extraocular movement intact.    Head:  Normocephalic and atraumatic.  No lesions or scars.    Ears:  Pinnae and tragus non-tender.  EAC's and TM's were clear.     Nose:  Sinuses were non-tender.  Anterior rhinoscopy revealed midline septum and absence of purulence or polyps.    OC/OP:  Normal tongue, floor of mouth, buccal mucosa, and palate.  No  lesions or masses on inspection or palpation.  No abnormal lymph tissue in the oropharynx.  The pterygoid region is non-tender.    HP/LX:  Mirror exam of the larynx reveals a sharp epiglottis and mobile arytenoids.  No pooling seen.  The cords themselves appear clear and mobile.   Neck:  Supple with normal laryngeal and tracheal landmarks.  The parotid beds were without masses.  No palpable thyroid.  Lymphatic:   Right LNB palpable rubbery                 IMPRESSION AND PLAN: wll get US an possible biopsy xsome concern for autoimmune, versus reactive versus possible LOW grade neoplasm.     Thank you very much for the opportunity to participate in the care of your patient.      Kirill Polk M.D.  Otolaryngology- Head & Neck Surgery  [phone]

## 2017-12-20 ENCOUNTER — TELEPHONE (OUTPATIENT)
Dept: FAMILY MEDICINE | Facility: CLINIC | Age: 77
End: 2017-12-20
Payer: COMMERCIAL

## 2017-12-20 RX ORDER — WARFARIN SODIUM 7.5 MG/1
TABLET ORAL
Qty: 90 TABLET | Refills: 0 | Status: SHIPPED | OUTPATIENT
Start: 2017-12-20 | End: 2018-03-29

## 2017-12-20 NOTE — TELEPHONE ENCOUNTER
Prescription approved per [facility] [place].  Lydia HALEY RN    Requested Prescriptions   Pending Prescriptions Disp Refills     warfarin (COUMADIN) 7.5 MG tablet [Pharmacy Med Name: WARFARIN SODIUM 7.5 MG TABLET] 100 tablet 0     Sig: TAKE 1 TABLET BY MOUTH DAILY. CURRENT DOSE IS 7.5 MG DAILY. DOSE ADJUSTED PER INR RESULT.    Vitamin K Antagonists Failed    12/18/2017  9:40 AM       Failed - INR is within goal in the past 6 weeks    Confirm INR is within goal in the past 6 weeks.     Recent Labs   Lab Test  12/19/17   1614   INR  2.78*                      Passed - Recent or future visit with authorizing provider's specialty    Patient had office visit in the last year or has a visit in the next 30 days with authorizing provider.  See chart review.              Passed - Patient is 18 years of age or older       Passed - Patient is not pregnant       Passed - No positive pregnancy on file in past 12 months        Next 5 appointments (look out 90 days)     Jan 31, 2018  1:00 PM CST   Office Visit with Ilene Tristan MD   [facility] ([facility])    [address]  [facility] [id number] [phone]

## 2017-12-20 NOTE — TELEPHONE ENCOUNTER
ANTICOAGULATION FOLLOW-UP CLINIC VISIT    Patient Name:  Betty Tee  Date:  12/20/2017  Contact Type:  Telephone    SUBJECTIVE:  Bleeding Signs/Symptoms: None  Thromboembolic Signs/Symptoms: None    Medication Changes:  No  Dietary Changes:  No  Bacterial/Viral Infection:  No    Missed Coumadin Doses:  None  Other Concerns:  No      ASSESSMENT/PLAN:  See: ANTICOAGULATION QIC flow sheet.    Patient was at ENT yesterday - had INR checked  INR 2.78  Advised she continue 7.5mg daily   Recheck INR 2 weeks (has several appts 1/3/2018 at [facility] - will have INR checked then)  Lydia HALEY RN    Dosage adjustment made based on physician directed care plan.    JIMI VOGT

## 2017-12-28 DIAGNOSIS — R59.9 ENLARGED LYMPH NODES: Primary | ICD-10-CM

## 2017-12-28 NOTE — PROGRESS NOTES
The patient was called to provide her ultra sound results. Because of an enlarged lymph node,greater than cm Dr Polk is recommending a FNA of it to rule out a low grade lymphoma. The order was placed today and scheduling will be worked on.   Rakesh Pearson ,RN  [phone]

## 2017-12-30 DIAGNOSIS — K21.9 GASTROESOPHAGEAL REFLUX DISEASE WITHOUT ESOPHAGITIS: ICD-10-CM

## 2017-12-30 DIAGNOSIS — E11.9 TYPE 2 DIABETES MELLITUS WITHOUT COMPLICATION, WITHOUT LONG-TERM CURRENT USE OF INSULIN (H): ICD-10-CM

## 2018-01-02 RX ORDER — PANTOPRAZOLE SODIUM 20 MG/1
20-40 TABLET, DELAYED RELEASE ORAL DAILY
Qty: 90 TABLET | Refills: 0 | Status: SHIPPED | OUTPATIENT
Start: 2018-01-02 | End: 2018-02-21

## 2018-01-02 RX ORDER — PANTOPRAZOLE SODIUM 40 MG/1
TABLET, DELAYED RELEASE ORAL
Start: 2018-01-02

## 2018-01-02 RX ORDER — METFORMIN HCL 500 MG
TABLET, EXTENDED RELEASE 24 HR ORAL
Qty: 180 TABLET | Refills: 0 | Status: SHIPPED | OUTPATIENT
Start: 2018-01-02 | End: 2018-03-15

## 2018-01-02 NOTE — TELEPHONE ENCOUNTER
Prescription approved per [facility] Refill Protocol.  Lydia HALEY RN    Requested Prescriptions   Pending Prescriptions Disp Refills     pantoprazole (PROTONIX) 40 MG EC tablet [Pharmacy Med Name: PANTOPRAZOLE SOD DR 40 MG TAB] 90 tablet 1     Sig: TAKE 1 TABLET (40 MG) BY MOUTH DAILY    PPI Protocol Passed    12/30/2017  9:47 PM       Passed - Not on Clopidogrel (unless Pantoprazole ordered)       Passed - No diagnosis of osteoporosis on record       Passed - Recent or future visit with authorizing provider's specialty    Patient had office visit in the last year or has a visit in the next 30 days with authorizing provider.  See chart review.              Passed - Patient is age 18 or older       Passed - No active pregnacy on record       Passed - No positive pregnancy test in past 12 months        metFORMIN (GLUCOPHAGE-XR) 500 MG 24 hr tablet [Pharmacy Med Name: METFORMIN  MG TABLET] 180 tablet 0     Sig: TAKE 2 TABLETS BY MOUTH DAILY WITH DINNER    Biguanide Agents Failed    12/30/2017  9:47 PM       Failed - Patient does NOT have a diagnosis of CHF.       Passed - Patient's BP is less than 140/90    BP Readings from Last 3 Encounters:   11/08/17 100/62   10/26/17 113/76   10/19/17 139/85                Passed - Patient has documented LDL within the past 12 mos.    Recent Labs   Lab Test  06/22/17   1700   LDL  13            Passed - Patient has had a Microalbumin in the past 12 mos.    Recent Labs   Lab Test  10/06/17   1422   MICROL  14   UMALCR  29.65*            Passed - Patient is age 10 or older       Passed - Patient has documented A1c within the specified period of time.    Recent Labs   Lab Test  10/06/17   1421   A1C  7.3*            Passed - Patient's CR is NOT>1.4 OR Patient's EGFR is NOT<45 within past 12 mos.    Recent Labs   Lab Test  10/26/17   1457   GFRESTIMATED  62   GFRESTBLACK  75       Recent Labs   Lab Test  10/26/17   1457   CR  0.89            Passed - Patient is not pregnant       Passed  - Patient has not had a positive pregnancy test within the past 12 mos.        Passed - Recent (6 mos) or future visit with authorizing provider's specialty    Patient had office visit in the last 6 months or has a visit in the next 30 days with authorizing provider.  See chart review.             Next 5 appointments (look out 90 days)     Jan 31, 2018  1:00 PM CST   Office Visit with Ilene Tristan MD   [facility] ([facility])    [address]  [facility] [address] [phone]

## 2018-01-03 NOTE — PROGRESS NOTES
Dear Dr. Jimenez:    I had the pleasure of meeting Betty Tee in consultation today at the [facility] at your request.     History of Present Illness:         Patient has lymph node type mass in the right neck non tender no exacerbating or alleviating features no antecedent problems.       MEDICATIONS:     Current Outpatient Prescriptions   Medication Sig Dispense Refill     metFORMIN (GLUCOPHAGE-XR) 500 MG 24 hr tablet TAKE 2 TABLETS BY MOUTH DAILY WITH DINNER 180 tablet 0     pantoprazole (PROTONIX) 20 MG EC tablet Take 1-2 tablets (20-40 mg) by mouth daily 90 tablet 0     warfarin (COUMADIN) 7.5 MG tablet TAKE 1 TABLET BY MOUTH DAILY. CURRENT DOSE IS 7.5 MG DAILY. DOSE ADJUSTED PER INR RESULT. 90 tablet 0     LANTUS SOLOSTAR 100 UNIT/ML soln INJECT 25 UNTIS SUBCUTANEOUSLY EVERY DAY 15 mL 0     escitalopram (LEXAPRO) 10 MG tablet TAKE 1 TABLET (10 MG) BY MOUTH DAILY 90 tablet 0     order for DME Equipment being ordered: Full face mask for CPAP - size: F10 large 1 each 0     traZODone (DESYREL) 50 MG tablet TAKE 1/2-1 TABLET BY MOUTH NIGHTLY AS NEEDED, CAN TITRATE DOWN TO 1/2TAB OR UP TO 2TABS AS NEEDED 180 tablet 0     azithromycin (ZITHROMAX) 250 MG tablet Two tablets first day, then one tablet daily for four days. 6 tablet 0     NOVOLOG FLEXPEN 100 UNIT/ML soln INJECT 12 UNITS SUBCUTANEOUS 3 TIMES DAILY (WITH MEALS) 15 mL 1     azithromycin (ZITHROMAX) 250 MG tablet Take 2 tablets (500 mg) the first day, then take 1 tablet (250 mg) by mouth daily for a total of 5 days. 6 tablet 0     alendronate (FOSAMAX) 70 MG tablet Take 1 tablet (70 mg) by mouth every 7 days 4 tablet 11     lisinopril (PRINIVIL/ZESTRIL) 2.5 MG tablet TAKE 1 TABLET (2.5 MG) BY MOUTH DAILY 90 tablet 0     atorvastatin (LIPITOR) 20 MG tablet Take 1 tablet (20 mg) by mouth daily 90 tablet 0     potassium chloride SA (K-DUR/KLOR-CON M) 20 MEQ CR tablet Take 2 tablets (40 mEq) by mouth daily 180 tablet 3      azithromycin (ZITHROMAX) 250 MG tablet Two tablets first day, then one tablet daily for four days. 6 tablet 0     furosemide (LASIX) 40 MG tablet Take 1 tablet (40 mg) by mouth 2 times daily 180 tablet 3     PARoxetine (PAXIL) 10 MG tablet TAKE 1 TABLET (10MG) BY MOUTH AT BEDTIME 30 tablet 1     montelukast (SINGULAIR) 10 MG tablet Take 1 tablet (10 mg) by mouth At Bedtime 90 tablet 1     spironolactone (ALDACTONE) 25 MG tablet Take 2 tablets (50 mg) by mouth daily 180 tablet 3     traZODone (DESYREL) 50 MG tablet TAKE 1/2-1 TABLET BY MOUTH NIGHTLY AS NEEDED, CAN TITRATE DOWN TO 1/2TAB OR UP TO 2TABS AS NEEDED 90 tablet 1     blood glucose monitoring (NO BRAND SPECIFIED) test strip Use to test blood sugars 4 times daily or as directed 300 strip 3     blood glucose monitoring (ACCU-CHEK MULTICLIX) lancets Use to test blood sugar 4 times daily or as directed. 4 Box 3     insulin pen needle (B-D U/F) 31G X 8 MM Use 4 times daily (with Lantus and Novolog) or as directed. 400 each 3     insulin pen needle (BD JANE U/F) 32G X 4 MM Use 4 daily as directed. 200 each 11     blood glucose monitoring (ACCU-CHEK SHANNON PLUS) test strip Use to test blood sugar 4 times daily or as directed.  Ok to substitute alternative if insurance prefers. 120 strip 11     blood glucose monitoring (ACCU-CHEK FASTCLIX) lancets Use to test blood sugar 4 times daily or as directed.  Ok to substitute alternative if insurance prefers. 1 Box 11     predniSONE (DELTASONE) 10 MG tablet Take 2 tablets for three days (4/12-14), then take 1 tablet daily (10 mg daily starting 4/15) 90 tablet 3     ketoconazole (NIZORAL) 2 % cream Apply topically 2 times daily 60 g 1     metoprolol (TOPROL-XL) 100 MG 24 hr tablet Take 1 tablet (100 mg) by mouth daily 90 tablet 3     albuterol (PROAIR HFA, PROVENTIL HFA, VENTOLIN HFA) 108 (90 BASE) MCG/ACT inhaler Inhale 2 puffs into the lungs every 6 hours 1 Inhaler 3     order for DME Oxygen: Patient requires supplemental  Oxygen 4 LPM via nasal canula with activity. Please provide patient with a home unit and with portability capability. Oxygen will be for a lifetime. 1 Device 0     polyethylene glycol (MIRALAX/GLYCOLAX) packet Take 17 g by mouth daily as needed for constipation 7 packet 0     acyclovir (ZOVIRAX) 5 % ointment Apply topically 6 times daily (Patient taking differently: Apply topically 6 times daily As needed for outbreaks) 15 g 3     fluticasone (FLOVENT HFA) 220 MCG/ACT inhaler Inhale 2 puffs into the lungs 2 times daily 3 Inhaler 3     acyclovir (ZOVIRAX) 400 MG tablet Take 400 mg by mouth See Admin Instructions 5 times daily as needed for outbreaks       fluticasone (FLONASE) 50 MCG/ACT nasal spray Spray 1-2 sprays into both nostrils daily (Patient taking differently: Spray 1-2 sprays into both nostrils daily as needed for allergies ) 16 g 5     COMPRESSION STOCKINGS Wear compression stockings in affected leg (right leg) or both legs most of the time during the day and take them off at night. 2 each 2     acetaminophen (TYLENOL) 500 MG tablet Take 500 mg by mouth nightly as needed          ALLERGIES:    Allergies   Allergen Reactions     Augmentin Nausea and Vomiting     Codeine Nausea and Vomiting     Phenobarbital Itching       HABITS/SOCIAL HISTORY: no smoking or excessive etoh    PAST MEDICAL HISTORY:   Past Medical History:   Diagnosis Date     Alcohol abuse, in remission      Allergic rhinitis, cause unspecified     allegra helps when she takes it     Antiplatelet or antithrombotic long-term use      Atrial fibrillation (H)     in hosp in 11/11 after surgery w/ fluid overload     Cardiomegaly     LVH on stress echo- cardiac w/u at [facility]- neg CT scan for PE, neg stress echo in 8/06     Chest pain, unspecified      Disorder of bone and cartilage, unspecified     osteopenia (had been on prempro), improved on 6/06 dexa, stable dexa 11/10     Diverticulosis of colon (without mention of hemorrhage)     last  episode yrs ago     Essential hypertension, benign      Gastro-oesophageal reflux disease      Insomnia, unspecified     weaned off clonazepam     Irregular heart beat      Lumbago 7/09    MRI with NEAL, now seeing Dr. Cain for sciatic sx's     Major depressive disorder, recurrent episode, moderate (H)      Obstructive sleep apnea      Osteoarthrosis, unspecified whether generalized or localized, unspecified site      Sjogren's syndrome (H)      Sleep apnea      Tobacco use disorder     chantix in 9/07, started again in 6/08, working        FAMILY HISTORY:    Family History   Problem Relation Age of Onset     C.A.D. Mother 63     MI- first at age 63     HEART DISEASE Mother      Hypertension Mother      CEREBROVASCULAR DISEASE Mother      Hyperlipidemia Mother      Alcohol/Drug Father      Alzheimer Disease Father      Dementia Father      Hypertension Father      Hyperlipidemia Father      C.A.D. Sister 52     Minor MI- age 50's     HEART DISEASE Sister      Hypertension Sister      Hypertension Sister      Hypertension Brother      Cancer - colorectal Sister 48     Late 40's early 50's     Prostate Cancer Brother 74     Dx'd age 74     GASTROINTESTINAL DISEASE Sister      Diverticulitis     GASTROINTESTINAL DISEASE Brother      Diverticulitis     Lipids Sister      Lipids Sister      Parkinsonism Brother      DIABETES Sister      HEART DISEASE Sister      CHF     CANCER Sister      lung, smoker     Substance Abuse Sister      Substance Abuse Brother      Asthma Sister      CANCER Sister      Breast Cancer Daughter      Prostate Cancer Brother      Hyperlipidemia Brother        REVIEW OF SYSTEMS:  12 point ROS was negative other than the symptoms noted above in the HPI.    PHYSICAL EXAMINATION: PHYSICAL EXAMINATION:    Constitutional:  The patient was unaccompanied, well-groomed, and in no acute distress.    Skin:  Warm and pink.    Neurologic:  Alert and oriented x 3.  CN's III-XII within normal limits.  Voice  normal.   Psychiatric:  The patient's affect was calm, cooperative, and appropriate.    Respiratory:  Breathing comfortably without stridor or exertion of accessory muscles.    Eyes: Extraocular movement intact.    Head:  Normocephalic and atraumatic.  No lesions or scars.    Ears:  Pinnae and tragus non-tender.  EAC's and TM's were clear.     Nose:  Sinuses were non-tender.  Anterior rhinoscopy revealed midline septum and absence of purulence or polyps.    OC/OP:  Normal tongue, floor of mouth, buccal mucosa, and palate.  No lesions or masses on inspection or palpation.  No abnormal lymph tissue in the oropharynx.  The pterygoid region is non-tender.    HP/LX:  Mirror exam of the larynx reveals a sharp epiglottis and mobile arytenoids.  No pooling seen.  The cords themselves appear clear and mobile.   Neck:  Supple with normal laryngeal and tracheal landmarks.  The parotid beds were without masses.  No palpable thyroid.  Lymphatic:  There is right rubbery  Ln in the right neck.               IMPRESSION AND PLAN: will get US and perhaps biopsy     Thank you very much for the opportunity to participate in the care of your patient.      Kirill Polk M.D.  Otolaryngology- Head & Neck Surgery  [phone]      Dear Dr. Jimenez:    I had the pleasure of meeting Betty Tee in consultation today at the [facility] at your request.     History of Present Illness:               MEDICATIONS:     Current Outpatient Prescriptions   Medication Sig Dispense Refill     metFORMIN (GLUCOPHAGE-XR) 500 MG 24 hr tablet TAKE 2 TABLETS BY MOUTH DAILY WITH DINNER 180 tablet 0     pantoprazole (PROTONIX) 20 MG EC tablet Take 1-2 tablets (20-40 mg) by mouth daily 90 tablet 0     warfarin (COUMADIN) 7.5 MG tablet TAKE 1 TABLET BY MOUTH DAILY. CURRENT DOSE IS 7.5 MG DAILY. DOSE ADJUSTED PER INR RESULT. 90 tablet 0     LANTUS SOLOSTAR 100 UNIT/ML soln INJECT 25 UNTIS SUBCUTANEOUSLY EVERY DAY 15 mL  0     escitalopram (LEXAPRO) 10 MG tablet TAKE 1 TABLET (10 MG) BY MOUTH DAILY 90 tablet 0     order for DME Equipment being ordered: Full face mask for CPAP - size: F10 large 1 each 0     traZODone (DESYREL) 50 MG tablet TAKE 1/2-1 TABLET BY MOUTH NIGHTLY AS NEEDED, CAN TITRATE DOWN TO 1/2TAB OR UP TO 2TABS AS NEEDED 180 tablet 0     azithromycin (ZITHROMAX) 250 MG tablet Two tablets first day, then one tablet daily for four days. 6 tablet 0     NOVOLOG FLEXPEN 100 UNIT/ML soln INJECT 12 UNITS SUBCUTANEOUS 3 TIMES DAILY (WITH MEALS) 15 mL 1     azithromycin (ZITHROMAX) 250 MG tablet Take 2 tablets (500 mg) the first day, then take 1 tablet (250 mg) by mouth daily for a total of 5 days. 6 tablet 0     alendronate (FOSAMAX) 70 MG tablet Take 1 tablet (70 mg) by mouth every 7 days 4 tablet 11     lisinopril (PRINIVIL/ZESTRIL) 2.5 MG tablet TAKE 1 TABLET (2.5 MG) BY MOUTH DAILY 90 tablet 0     atorvastatin (LIPITOR) 20 MG tablet Take 1 tablet (20 mg) by mouth daily 90 tablet 0     potassium chloride SA (K-DUR/KLOR-CON M) 20 MEQ CR tablet Take 2 tablets (40 mEq) by mouth daily 180 tablet 3     azithromycin (ZITHROMAX) 250 MG tablet Two tablets first day, then one tablet daily for four days. 6 tablet 0     furosemide (LASIX) 40 MG tablet Take 1 tablet (40 mg) by mouth 2 times daily 180 tablet 3     PARoxetine (PAXIL) 10 MG tablet TAKE 1 TABLET (10MG) BY MOUTH AT BEDTIME 30 tablet 1     montelukast (SINGULAIR) 10 MG tablet Take 1 tablet (10 mg) by mouth At Bedtime 90 tablet 1     spironolactone (ALDACTONE) 25 MG tablet Take 2 tablets (50 mg) by mouth daily 180 tablet 3     traZODone (DESYREL) 50 MG tablet TAKE 1/2-1 TABLET BY MOUTH NIGHTLY AS NEEDED, CAN TITRATE DOWN TO 1/2TAB OR UP TO 2TABS AS NEEDED 90 tablet 1     blood glucose monitoring (NO BRAND SPECIFIED) test strip Use to test blood sugars 4 times daily or as directed 300 strip 3     blood glucose monitoring (ACCU-CHEK MULTICLIX) lancets Use to test blood sugar 4  times daily or as directed. 4 Box 3     insulin pen needle (B-D U/F) 31G X 8 MM Use 4 times daily (with Lantus and Novolog) or as directed. 400 each 3     insulin pen needle (BD JANE U/F) 32G X 4 MM Use 4 daily as directed. 200 each 11     blood glucose monitoring (ACCU-CHEK SHANNON PLUS) test strip Use to test blood sugar 4 times daily or as directed.  Ok to substitute alternative if insurance prefers. 120 strip 11     blood glucose monitoring (ACCU-CHEK FASTCLIX) lancets Use to test blood sugar 4 times daily or as directed.  Ok to substitute alternative if insurance prefers. 1 Box 11     predniSONE (DELTASONE) 10 MG tablet Take 2 tablets for three days (4/12-14), then take 1 tablet daily (10 mg daily starting 4/15) 90 tablet 3     ketoconazole (NIZORAL) 2 % cream Apply topically 2 times daily 60 g 1     metoprolol (TOPROL-XL) 100 MG 24 hr tablet Take 1 tablet (100 mg) by mouth daily 90 tablet 3     albuterol (PROAIR HFA, PROVENTIL HFA, VENTOLIN HFA) 108 (90 BASE) MCG/ACT inhaler Inhale 2 puffs into the lungs every 6 hours 1 Inhaler 3     order for DME Oxygen: Patient requires supplemental Oxygen 4 LPM via nasal canula with activity. Please provide patient with a home unit and with portability capability. Oxygen will be for a lifetime. 1 Device 0     polyethylene glycol (MIRALAX/GLYCOLAX) packet Take 17 g by mouth daily as needed for constipation 7 packet 0     acyclovir (ZOVIRAX) 5 % ointment Apply topically 6 times daily (Patient taking differently: Apply topically 6 times daily As needed for outbreaks) 15 g 3     fluticasone (FLOVENT HFA) 220 MCG/ACT inhaler Inhale 2 puffs into the lungs 2 times daily 3 Inhaler 3     acyclovir (ZOVIRAX) 400 MG tablet Take 400 mg by mouth See Admin Instructions 5 times daily as needed for outbreaks       fluticasone (FLONASE) 50 MCG/ACT nasal spray Spray 1-2 sprays into both nostrils daily (Patient taking differently: Spray 1-2 sprays into both nostrils daily as needed for  allergies ) 16 g 5     COMPRESSION STOCKINGS Wear compression stockings in affected leg (right leg) or both legs most of the time during the day and take them off at night. 2 each 2     acetaminophen (TYLENOL) 500 MG tablet Take 500 mg by mouth nightly as needed          ALLERGIES:    Allergies   Allergen Reactions     Augmentin Nausea and Vomiting     Codeine Nausea and Vomiting     Phenobarbital Itching       HABITS/SOCIAL HISTORY: no smoking     PAST MEDICAL HISTORY:   Past Medical History:   Diagnosis Date     Alcohol abuse, in remission      Allergic rhinitis, cause unspecified     allegra helps when she takes it     Antiplatelet or antithrombotic long-term use      Atrial fibrillation (H)     in hosp in 11/11 after surgery w/ fluid overload     Cardiomegaly     LVH on stress echo- cardiac w/u at [facility]- neg CT scan for PE, neg stress echo in 8/06     Chest pain, unspecified      Disorder of bone and cartilage, unspecified     osteopenia (had been on prempro), improved on 6/06 dexa, stable dexa 11/10     Diverticulosis of colon (without mention of hemorrhage)     last episode yrs ago     Essential hypertension, benign      Gastro-oesophageal reflux disease      Insomnia, unspecified     weaned off clonazepam     Irregular heart beat      Lumbago 7/09    MRI with NEAL, now seeing Dr. Cain for sciatic sx's     Major depressive disorder, recurrent episode, moderate (H)      Obstructive sleep apnea      Osteoarthrosis, unspecified whether generalized or localized, unspecified site      Sjogren's syndrome (H)      Sleep apnea      Tobacco use disorder     chantix in 9/07, started again in 6/08, working        FAMILY HISTORY:    Family History   Problem Relation Age of Onset     C.A.D. Mother 63     MI- first at age 63     HEART DISEASE Mother      Hypertension Mother      CEREBROVASCULAR DISEASE Mother      Hyperlipidemia Mother      Alcohol/Drug Father      Alzheimer Disease Father      Dementia Father       Hypertension Father      Hyperlipidemia Father      C.A.D. Sister 52     Minor MI- age 50's     HEART DISEASE Sister      Hypertension Sister      Hypertension Sister      Hypertension Brother      Cancer - colorectal Sister 48     Late 40's early 50's     Prostate Cancer Brother 74     Dx'd age 74     GASTROINTESTINAL DISEASE Sister      Diverticulitis     GASTROINTESTINAL DISEASE Brother      Diverticulitis     Lipids Sister      Lipids Sister      Parkinsonism Brother      DIABETES Sister      HEART DISEASE Sister      CHF     CANCER Sister      lung, smoker     Substance Abuse Sister      Substance Abuse Brother      Asthma Sister      CANCER Sister      Breast Cancer Daughter      Prostate Cancer Brother      Hyperlipidemia Brother        REVIEW OF SYSTEMS:  12 point ROS was negative other than the symptoms noted above in the HPI.    PHYSICAL EXAMINATION: PHYSICAL EXAMINATION:    Constitutional:  The patient was unaccompanied, well-groomed, and in no acute distress.    Skin:  Warm and pink.    Neurologic:  Alert and oriented x 3.  CN's III-XII within normal limits.  Voice normal.   Psychiatric:  The patient's affect was calm, cooperative, and appropriate.    Respiratory:  Breathing comfortably without stridor or exertion of accessory muscles.    Eyes: Pupils were equal and reactive.  Extraocular movement intact.    Head:  Normocephalic and atraumatic.  No lesions or scars.    Ears:  Pinnae and tragus non-tender.  EAC's and TM's were clear.     Nose:  Sinuses were non-tender.  Anterior rhinoscopy revealed midline septum and absence of purulence or polyps.    OC/OP:  Normal tongue, floor of mouth, buccal mucosa, and palate.  No lesions or masses on inspection or palpation.  No abnormal lymph tissue in the oropharynx.  The pterygoid region is non-tender.    HP/LX:  Mirror exam of the larynx reveals a sharp epiglottis and mobile arytenoids.  No pooling seen.  The cords themselves appear clear and mobile.   Neck:   Supple with normal laryngeal and tracheal landmarks.  The parotid beds were without masses.  No palpable thyroid.  Lymphatic:   Right LNB palpable rubbery                 IMPRESSION AND PLAN: wll get US an possible biopsy xsome concern for autoimmune, versus reactive versus possible LOW grade neoplasm.     Thank you very much for the opportunity to participate in the care of your patient.      Kirill Polk M.D.  Otolaryngology- Head & Neck Surgery  [phone]

## 2018-01-07 ENCOUNTER — MYC MEDICAL ADVICE (OUTPATIENT)
Dept: FAMILY MEDICINE | Facility: CLINIC | Age: 78
End: 2018-01-07

## 2018-01-08 ENCOUNTER — TELEPHONE (OUTPATIENT)
Dept: FAMILY MEDICINE | Facility: CLINIC | Age: 78
End: 2018-01-08

## 2018-01-08 NOTE — TELEPHONE ENCOUNTER
Received form(s) from [id number] for C-Pap supplies.  Placed form(s) in/on CW's desk.  Forms need to be signed and faxed to [phone]..    Call pt to verify form was sent: No  Copy needs to be sent for scanning after completion: Yes    Thank you, SOWMYA Sr, CMA

## 2018-01-10 ENCOUNTER — OFFICE VISIT (OUTPATIENT)
Dept: PULMONOLOGY | Facility: CLINIC | Age: 78
End: 2018-01-10
Attending: INTERNAL MEDICINE
Payer: MEDICARE

## 2018-01-10 VITALS
RESPIRATION RATE: 16 BRPM | DIASTOLIC BLOOD PRESSURE: 90 MMHG | HEART RATE: 84 BPM | SYSTOLIC BLOOD PRESSURE: 136 MMHG | OXYGEN SATURATION: 97 %

## 2018-01-10 DIAGNOSIS — J84.9 ILD (INTERSTITIAL LUNG DISEASE) (H): Primary | ICD-10-CM

## 2018-01-10 PROCEDURE — 40000809 ZZH STATISTIC NO DOCUMENTATION TO SUPPORT CHARGE

## 2018-01-10 PROCEDURE — G0463 HOSPITAL OUTPT CLINIC VISIT: HCPCS | Mod: ZF

## 2018-01-10 ASSESSMENT — PAIN SCALES - GENERAL: PAINLEVEL: NO PAIN (0)

## 2018-01-10 NOTE — LETTER
1/10/2018       RE: Betty Tee  [address]  [facility] [address]     Dear Colleague,    Thank you for referring your patient, Betty Tee, to the [facility] L[facility]G [facility] at [facility]. Please see a copy of my visit note below.          [facility]   Pulmonary Clinic Follow Up Note  January 10, 2018      Betty Tee MRN# [id number]   Age: 77 year old YOB: 1940     Date of Consultation: January 10, 2018    Primary care provider: Ilene Tristan     History taken from; Patient      Betty Tee is a 77 year old female seen in the Pulmonary Clinic  for f/u.  Chief Complaint   Patient presents with     Interstitial Lung Disease (ILD)     Patient is being seen for ILD follow up            Pulmonary Problem List / Reason for follow up:   1. ILD  2. KAMILAH           Assessment and Plan:   ASSESSMENT AND PLAN:  The patient is a 77-year-old lady with a history of interstitial lung disease and bronchiolitis obliterative, coming for the followup visit.   1.  Interstitial lung disease.  At this time, we will continue with 10 mg of prednisone.  The patient is doing very well.  The patient has interstitial lung disease associated with Sjogren's syndrome and the patient is stable.   2.  Bronchiolitis obliterans.  The patient is on albuterol and Flovent.  The patient is doing very well.  The patient's disease is very well controlled.     3.  The patient has poorly controlled atrial fibrillation.  We discussed this problem with the patient's EP team and they will contact the patient to improve the management.       We explained the plan to the patient including the risks and benefits.  The patient expressed understanding and agreed with the plan.       Thank you very much for the opportunity to participate in the care of this very pleasant patient.   Return visit in 6 months      Meño Walsh  M.D.         History of Present Illness / Interval History:   CHIEF COMPLAINT:  Bronchiolitis obliterans.       HISTORY OF PRESENT ILLNESS:  The patient is doing very well with regard to her respiratory problems.  The patient's pulmonary function tests are slightly better.  The patient is using her albuterol once weekly.  Overall, the patient is doing very well with regard to her respiratory problems.  However, the patient is complaining about poorly controlled a-fib with frequent episodes of tachycardia.           Pulmonary Data:   Exposure history: Asbestos;  No , TB;  No , Radiation;   No          Medications:     Current Outpatient Prescriptions   Medication Sig     metFORMIN (GLUCOPHAGE-XR) 500 MG 24 hr tablet TAKE 2 TABLETS BY MOUTH DAILY WITH DINNER     pantoprazole (PROTONIX) 20 MG EC tablet Take 1-2 tablets (20-40 mg) by mouth daily     warfarin (COUMADIN) 7.5 MG tablet TAKE 1 TABLET BY MOUTH DAILY. CURRENT DOSE IS 7.5 MG DAILY. DOSE ADJUSTED PER INR RESULT.     LANTUS SOLOSTAR 100 UNIT/ML soln INJECT 25 UNTIS SUBCUTANEOUSLY EVERY DAY     escitalopram (LEXAPRO) 10 MG tablet TAKE 1 TABLET (10 MG) BY MOUTH DAILY     order for DME Equipment being ordered: Full face mask for CPAP - size: F10 large     traZODone (DESYREL) 50 MG tablet TAKE 1/2-1 TABLET BY MOUTH NIGHTLY AS NEEDED, CAN TITRATE DOWN TO 1/2TAB OR UP TO 2TABS AS NEEDED     azithromycin (ZITHROMAX) 250 MG tablet Two tablets first day, then one tablet daily for four days.     NOVOLOG FLEXPEN 100 UNIT/ML soln INJECT 12 UNITS SUBCUTANEOUS 3 TIMES DAILY (WITH MEALS)     azithromycin (ZITHROMAX) 250 MG tablet Take 2 tablets (500 mg) the first day, then take 1 tablet (250 mg) by mouth daily for a total of 5 days.     alendronate (FOSAMAX) 70 MG tablet Take 1 tablet (70 mg) by mouth every 7 days     lisinopril (PRINIVIL/ZESTRIL) 2.5 MG tablet TAKE 1 TABLET (2.5 MG) BY MOUTH DAILY     atorvastatin (LIPITOR) 20 MG tablet Take 1 tablet (20 mg) by mouth daily      potassium chloride SA (K-DUR/KLOR-CON M) 20 MEQ CR tablet Take 2 tablets (40 mEq) by mouth daily     azithromycin (ZITHROMAX) 250 MG tablet Two tablets first day, then one tablet daily for four days.     furosemide (LASIX) 40 MG tablet Take 1 tablet (40 mg) by mouth 2 times daily     PARoxetine (PAXIL) 10 MG tablet TAKE 1 TABLET (10MG) BY MOUTH AT BEDTIME     montelukast (SINGULAIR) 10 MG tablet Take 1 tablet (10 mg) by mouth At Bedtime     spironolactone (ALDACTONE) 25 MG tablet Take 2 tablets (50 mg) by mouth daily     traZODone (DESYREL) 50 MG tablet TAKE 1/2-1 TABLET BY MOUTH NIGHTLY AS NEEDED, CAN TITRATE DOWN TO 1/2TAB OR UP TO 2TABS AS NEEDED     blood glucose monitoring (NO BRAND SPECIFIED) test strip Use to test blood sugars 4 times daily or as directed     blood glucose monitoring (ACCU-CHEK MULTICLIX) lancets Use to test blood sugar 4 times daily or as directed.     insulin pen needle (B-D U/F) 31G X 8 MM Use 4 times daily (with Lantus and Novolog) or as directed.     insulin pen needle (BD JANE U/F) 32G X 4 MM Use 4 daily as directed.     blood glucose monitoring (ACCU-CHEK SHANNON PLUS) test strip Use to test blood sugar 4 times daily or as directed.  Ok to substitute alternative if insurance prefers.     blood glucose monitoring (ACCU-CHEK FASTCLIX) lancets Use to test blood sugar 4 times daily or as directed.  Ok to substitute alternative if insurance prefers.     predniSONE (DELTASONE) 10 MG tablet Take 2 tablets for three days (4/12-14), then take 1 tablet daily (10 mg daily starting 4/15)     ketoconazole (NIZORAL) 2 % cream Apply topically 2 times daily     metoprolol (TOPROL-XL) 100 MG 24 hr tablet Take 1 tablet (100 mg) by mouth daily     albuterol (PROAIR HFA, PROVENTIL HFA, VENTOLIN HFA) 108 (90 BASE) MCG/ACT inhaler Inhale 2 puffs into the lungs every 6 hours     order for DME Oxygen: Patient requires supplemental Oxygen 4 LPM via nasal canula with activity. Please provide patient with a  home unit and with portability capability. Oxygen will be for a lifetime.     polyethylene glycol (MIRALAX/GLYCOLAX) packet Take 17 g by mouth daily as needed for constipation     acyclovir (ZOVIRAX) 5 % ointment Apply topically 6 times daily (Patient taking differently: Apply topically 6 times daily As needed for outbreaks)     fluticasone (FLOVENT HFA) 220 MCG/ACT inhaler Inhale 2 puffs into the lungs 2 times daily     acyclovir (ZOVIRAX) 400 MG tablet Take 400 mg by mouth See Admin Instructions 5 times daily as needed for outbreaks     fluticasone (FLONASE) 50 MCG/ACT nasal spray Spray 1-2 sprays into both nostrils daily (Patient taking differently: Spray 1-2 sprays into both nostrils daily as needed for allergies )     COMPRESSION STOCKINGS Wear compression stockings in affected leg (right leg) or both legs most of the time during the day and take them off at night.     acetaminophen (TYLENOL) 500 MG tablet Take 500 mg by mouth nightly as needed      No current facility-administered medications for this visit.              Past Medical History:     Past Medical History:   Diagnosis Date     Alcohol abuse, in remission      Allergic rhinitis, cause unspecified     allegra helps when she takes it     Antiplatelet or antithrombotic long-term use      Atrial fibrillation (H)     in hosp in 11/11 after surgery w/ fluid overload     Cardiomegaly     LVH on stress echo- cardiac w/u at [facility] ER- neg CT scan for PE, neg stress echo in 8/06     Chest pain, unspecified      Disorder of bone and cartilage, unspecified     osteopenia (had been on prempro), improved on 6/06 dexa, stable dexa 11/10     Diverticulosis of colon (without mention of hemorrhage)     last episode yrs ago     Essential hypertension, benign      Gastro-oesophageal reflux disease      Insomnia, unspecified     weaned off clonazepam     Irregular heart beat      Lumbago 7/09    MRI with [facility]AL, now seeing Dr. Cain for sciatic sx's     Major depressive  disorder, recurrent episode, moderate (H)      Obstructive sleep apnea      Osteoarthrosis, unspecified whether generalized or localized, unspecified site      Sjogren's syndrome (H)      Sleep apnea      Tobacco use disorder     chantix in 9/07, started again in 6/08, working             Past Surgical History:     Past Surgical History:   Procedure Laterality Date     BACK SURGERY  1962     BIOPSY BREAST  9/27/02    Biopsy Left Breast     C APPENDECTOMY  1970's?     C NONSPECIFIC PROCEDURE  11/05    exploratory abd lap, adhesions, resolved RLQ pain, diverticulitis episodes     CARDIAC SURGERY       CHOLECYSTECTOMY  1990's?     COLECTOMY LEFT  11/7/2011    Procedure:COLECTOMY LEFT; Laparoscopic mobilization of splenic flexture, sigmoid colectomy, coloprotoscopy, loop illeostomy; Surgeon:CK CASTANEDA; Location:[facility] OR     HYSTERECTOMY TOTAL ABDOMINAL, BILATERAL SALPINGO-OOPHORECTOMY, COMBINED  11/7/2011    Procedure:COMBINED HYSTERECTOMY TOTAL ABDOMINAL, BILATERAL SALPINGO-OOPHORECTOMY; total abdominal hysterectomy, bilateral salpingo-oophorectomy; Surgeon:ALETA MANUEL; Location:[facility] OR     INSERT STENT URETER  11/7/2011    Procedure:INSERT STENT URETER; Placement of Bilateral Ureteral Stents ; Surgeon:PRANEETH BRYANT; Location:[facility]     SIGMOIDOSCOPY FLEXIBLE  11/3/2011    Procedure:SIGMOIDOSCOPY FLEXIBLE; Flexible Sigmoidoscopy; Surgeon:CK CASTANEDA; Location:[facility]     TAKEDOWN ILEOSTOMY  2/1/2012    Procedure:TAKEDOWN ILEOSTOMY; Takedown Loop Ileostomy ; Surgeon:CK CASTANEDA; Location:[facility] OR             Social History:     Social History     Social History     Marital status: Single     Spouse name: N/A     Number of children: 0     Years of education: Ed Spec De     Occupational History     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     Social History Main Topics     Smoking status: Former Smoker     Packs/day: 0.50     Years: 10.00     Types: Cigarettes      Quit date: 8/1/2011     Smokeless tobacco: Never Used      Comment: 1/2 ppd     Alcohol use No      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     Drug use: No     Sexual activity: No     Other Topics Concern     Not on file     Social History Narrative    Social Documentation:        Balanced Diet: YES    Calcium intake: 1-2 per day    Caffeine: 4-5 cups per day    Exercise:  type of activity limited right now due to foot injury    Sunscreen: No    Seatbelts:  Yes    Self Breast Exam:  Yes    Self Testicular Exam: n/a    Physical/Emotional/Sexual Abuse: No    Do you feel safe in your environment? No-pt lives in [facility]        Cholesterol screen up to date: No-will check today    Eye Exam up to date: Yes    Dental Exam up to date: Yes    Pap smear up to date: Does Not Apply    Mammogram up to date: Yes-10/07    Dexa Scan up to date: Yes-2006    Colonoscopy up to date: Yes-2006    Immunizations up to date: Yes-td 2002    Glucose screen if over 40:  Yes    '09                             Family History:     Family History   Problem Relation Age of Onset     C.A.D. Mother 63     MI- first at age 63     HEART DISEASE Mother      Hypertension Mother      CEREBROVASCULAR DISEASE Mother      Hyperlipidemia Mother      Alcohol/Drug Father      Alzheimer Disease Father      Dementia Father      Hypertension Father      Hyperlipidemia Father      C.A.D. Sister 52     Minor MI- age 50's     HEART DISEASE Sister      Hypertension Sister      Hypertension Sister      Hypertension Brother      Cancer - colorectal Sister 48     Late 40's early 50's     Prostate Cancer Brother 74     Dx'd age 74     GASTROINTESTINAL DISEASE Sister      Diverticulitis     GASTROINTESTINAL DISEASE Brother      Diverticulitis     Lipids Sister      Lipids Sister      Parkinsonism Brother      DIABETES Sister      HEART DISEASE Sister      CHF     CANCER Sister      lung, smoker     Substance Abuse Sister      Substance Abuse Brother      Asthma Sister       CANCER Sister      Breast Cancer Daughter      Prostate Cancer Brother      Hyperlipidemia Brother              Immunization:     Immunization History   Administered Date(s) Administered     Influenza (High Dose) 3 valent vaccine 10/11/2013, 10/14/2014, 12/31/2015, 11/11/2016, 11/08/2017     Influenza (IIV3) PF 11/24/2003, 10/08/2004, 10/28/2005, 11/01/2006, 09/01/2010, 10/17/2011, 09/21/2012     Pneumo Conj 13-V (2010&after) 10/30/2015     Pneumococcal 23 valent 11/03/2010     TD (ADULT, 7+) 01/15/1993, 11/05/2002     TDAP Vaccine (Adacel) 11/03/2010     Zoster vaccine, live 10/05/2009              Review of Systems:   I have done 10 points of review systems and pertinent findings are as above, otherwise negative.             Physical Examination:   General: Alert, oriented, not in distress  B/P: 136/90, T: Data Unavailable, P: 84, R: 16  HEENT: neck supple, symmetrical, no lymph node enlargement, thyromegaly, bruit, JVD, pupils are isocoric and equally responsive to the light.   Lungs: both hemithoraces are symmetrical, normal to palpation, no dullness to percussion, auscultation of lungs revealed normal breathing sounds  CVS: Normal S1 and S2, no additional heart sounds, murmur, rub, normal peripheral pulses  Abdomen: Bowel sounds present, soft, non tender, non distended, no organomegaly, ascitis, mass  Extremities/musculoskeletal: no peripheral edema, deformity, cyanosis, clubbing  Neurology: alert, orientedx3, no motor/sensorial deficits, cranial nerves grossly normal  Skin: no rash  Psychiatry: Mood and affect are appropriate             DATA:     CBC RESULTS:     Recent Labs   Lab Test  11/08/17   1432  10/06/17   1421   WBC  15.6*  11.9*   RBC  4.59  4.54   HGB  13.4  13.2   HCT  42.1  41.0   PLT  264  231       Basic Metabolic Panel:  Recent Labs   Lab Test  10/26/17   1457  10/06/17   1421   NA  136  139   POTASSIUM  3.6  4.3   CHLORIDE  100  104   CO2  29  27   BUN  20  22   CR  0.89  0.94   GLC  158*   147*   CLAUDY  8.8  9.0       INR  Recent Labs   Lab Test  12/19/17   1614 11/14/17   INR  2.78*  2.4*        PFT   PFT Latest Ref Rng & Units 1/10/2018   FVC L 1.88   FEV1 L 1.51   FVC% % 68   FEV1% % 72     Thank you for allowing me participate in the care of Betty Tee.    Meño Walsh M.D.  Associate Professor of Medicine  Pulmonary, Allergy, Critical Care and Sleep

## 2018-01-10 NOTE — MR AVS SNAPSHOT
After Visit Summary   1/10/2018    Betty Tee    MRN: [id number]           Patient Information     Date Of Birth          1940        Visit Information        Provider Department      1/10/2018 10:30 AM Meño Walsh MD [facility]        Today's Diagnoses     ILD (interstitial lung disease) (H)    -  1      Care Instructions    Patient is instructed to call if there is any changes in symptoms.          Follow-ups after your visit        Follow-up notes from your care team     Return in about 6 months (around 7/10/2018).      Your next 10 appointments already scheduled     Jan 18, 2018 10:00 AM CST   (Arrive by 9:45 AM)   US BIOPSY FINE NEEDLE ASPIRATION LYMPH NODE with UCUS3,  IMAGING NURSE,  NEURO RAD   [facility] US ([facility])    [address] [place] [address] [phone]           Tell us in advance if there s any chance you may be pregnant.  Bring a list of your medicines to the exam. Include vitamins, minerals and over-the-counter drugs.  If you take blood thinners, you may need to stop taking them a few days before treatment. Talk to your doctor before stopping these medicines. You will need a blood test the morning of your exam.   Stop taking Coumadin (warfarin) 3 days before your exam. Restart the day after your exam.   If you take aspirin, you may need to stop taking it 3 days before your scan.   If you take Plavix, Ticlid, Pletal or Persantine, you may need to stop taking them 5 days before your scan. Please talk to your doctor before stopping these medicines.  If you will receive sedation for this test (medicine to help you relax):   See your family doctor for an exam within 30 days of treatment.   Plan for an adult to drive you home and stay with you for at least 6 hours.   Follow the eating and drinking guidelines checked below:   No eating or drinking for 4 hours before  your test. You may take medicine with small sips of water.   If you have diabetes:If you take insulin, call your diabetes care team. Do not take diabetes pills on the morning of your test. If you take metformin (Avandamet, Glucophage, Glucovance, Metaglip) and received contrast, wait 48 hours before re-starting this medicine.  Please call the Imaging Department at your exam site with any questions.            Jan 18, 2018  1:00 PM CST   (Arrive by 12:45 PM)   Return Visit with Carmenza Stringer MD   [facility] Rheumatology ([facility])    [address] [place] [address]  [address]  [facility] [address] [phone]            Jan 31, 2018  1:00 PM CST   Office Visit with Ilene Tristan MD   [facility] ([facility])    [address] [facility] [id number] [phone]           Bring a current list of meds and any records pertaining to this visit. For Physicals, please bring immunization records and any forms needing to be filled out. Please arrive 10 minutes early to complete paperwork.            Feb 07, 2018  2:00 PM CST   (Arrive by 1:45 PM)   NEW ARRHYTHMIA with Anderson Perez MD   [facility] Heart Care ([facility])    [address] [place] [address]  [address]  [facility] [address] [phone]            Apr 26, 2018  3:00 PM CDT   Lab with  LAB   [facility] Lab ([facility])    [address] [place] [address]  1st Floor  [facility] [id number] [phone]            Apr 26, 2018  3:30 PM CDT   (Arrive by 3:15 PM)   RETURN HEART FAILURE with Radha Rosa MD   [facility] Care ([facility])    [address] [place] [address]  [address]  [facility] [id number] [phone]            Jul 18, 2018 12:30 PM CDT   FULL PULMONARY FUNCTION with  PFL C   [facility] Pulmonary Function Testing ([facility])    [address] [place] [address]  Floor  [facility] [address] [phone]            Jul 18, 2018  1:30 PM CDT   (Arrive by 1:15 PM)   Return Interstitial Lung with Meño Walsh MD   [facility] ([facility])    [address] [facility]  [address]  [facility] [id number] [phone]              Who to contact     If you have questions or need follow up information about today's clinic visit or your schedule please contact [facility] directly at [phone].  Normal or non-critical lab and imaging results will be communicated to you by Pipedrivehart, letter or phone within 4 business days after the clinic has received the results. If you do not hear from us within 7 days, please contact the clinic through orderTalkt or phone. If you have a critical or abnormal lab result, we will notify you by phone as soon as possible.  Submit refill requests through AppFog or call your pharmacy and they will forward the refill request to us. Please allow 3 business days for your refill to be completed.          Additional Information About Your Visit        MyChart Information     AppFog gives you secure access to your electronic health record. If you see a primary care provider, you can also send messages to your care team and make appointments. If you have questions, please call your primary care clinic.  If you do not have a primary care provider, please call [phone] and they will assist you.        Care EveryWhere ID     This is your Care EveryWhere ID. This could be used by other organizations to access your [facility] medical records  [id number]        Your Vitals Were     Pulse Respirations Pulse Oximetry             84 16 97%          Blood Pressure from Last 3 Encounters:   01/10/18 136/90   11/08/17 100/62   10/26/17 113/76    Weight from Last 3 Encounters:   11/08/17 93.9 kg (207 lb)   10/26/17 95.4 kg (210 lb 4.8 oz)   10/19/17 95.3 kg (210 lb 1.6 oz)               Today, you had the following     No orders found for display         Today's Medication Changes          These changes are accurate as of: 1/10/18 11:59 PM.  If you have any questions, ask your nurse or doctor.               These medicines have changed or have updated prescriptions.        Dose/Directions    acyclovir 5 % ointment   Commonly known as:  ZOVIRAX   This may have changed:  additional instructions   Used for:  Recurrent cold sores        Apply topically 6 times daily   Quantity:  15 g   Refills:  3       fluticasone 50 MCG/ACT spray   Commonly known as:  FLONASE   This may have changed:    - when to take this  - reasons to take this   Used for:  Seasonal allergic rhinitis        Dose:  1-2 spray   Spray 1-2 sprays into both nostrils daily   Quantity:  16 g   Refills:  5                Primary Care Provider Office Phone # Fax #    Ilene Tristan [phone] [address] [address]        Equal Access to Services     [facility] HospitalSRIDEVI : Hadii aad ku hadasho Soomaali, waaxda luqadaha, qaybta kaalmada adeegyada, waxay idiin hayaan claudio mercedes . So [facility] [phone].    ATENCIÓN: Si habla español, tiene a conti disposición servicios gratuitos de asistencia lingüística. Noel al [phone].    We comply with applicable federal civil rights laws and [place] laws. We do not discriminate on the basis of race, color, national origin, age, disability, sex, sexual orientation, or gender identity.            Thank you!     Thank you for choosing [facility]  for your care. Our goal is always to provide you with excellent care. Hearing back from our patients is one way we can continue to improve our services. Please take a few minutes to complete the written survey that you may receive in the mail after your visit with us. Thank you!             Your Updated Medication List - Protect others around you: Learn how to  safely use, store and throw away your medicines at www.disposemymeds.org.          This list is accurate as of: 1/10/18 11:59 PM.  Always use your most recent med list.                   Brand Name Dispense Instructions for use Diagnosis    acyclovir 400 MG tablet    ZOVIRAX     Take 400 mg by mouth See Admin Instructions 5 times daily as needed for outbreaks        acyclovir 5 % ointment    ZOVIRAX    15 g    Apply topically 6 times daily    Recurrent cold sores       albuterol 108 (90 BASE) MCG/ACT Inhaler    PROAIR HFA/PROVENTIL HFA/VENTOLIN HFA    1 Inhaler    Inhale 2 puffs into the lungs every 6 hours    ILD (interstitial lung disease) (H)       alendronate 70 MG tablet    FOSAMAX    4 tablet    Take 1 tablet (70 mg) by mouth every 7 days    Other osteoporosis without current pathological fracture       atorvastatin 20 MG tablet    LIPITOR    90 tablet    Take 1 tablet (20 mg) by mouth daily    Hyperlipidemia LDL goal <100       * azithromycin 250 MG tablet    ZITHROMAX    6 tablet    Two tablets first day, then one tablet daily for four days.    Acute recurrent maxillary sinusitis       * azithromycin 250 MG tablet    ZITHROMAX    6 tablet    Take 2 tablets (500 mg) the first day, then take 1 tablet (250 mg) by mouth daily for a total of 5 days.    ILD (interstitial lung disease) (H)       * azithromycin 250 MG tablet    ZITHROMAX    6 tablet    Two tablets first day, then one tablet daily for four days.    Acute bronchitis, unspecified organism, Other elevated white blood cell (WBC) count, ILD (interstitial lung disease) (H)       * blood glucose monitoring lancets     4 Box    Use to test blood sugar 4 times daily or as directed.    Type 2 diabetes mellitus without complication, without long-term current use of insulin (H)       * blood glucose monitoring lancets     1 Box    Use to test blood sugar 4 times daily or as directed.  Ok to substitute alternative if insurance prefers.    Type 2 diabetes mellitus  without complication, without long-term current use of insulin (H)       * blood glucose monitoring test strip    no brand specified    300 strip    Use to test blood sugars 4 times daily or as directed    Type 2 diabetes mellitus without complication, without long-term current use of insulin (H)       * blood glucose monitoring test strip    ACCU-CHEK SHANNON PLUS    120 strip    Use to test blood sugar 4 times daily or as directed.  Ok to substitute alternative if insurance prefers.    Type 2 diabetes mellitus without complication, without long-term current use of insulin (H)       COMPRESSION STOCKINGS     2 each    Wear compression stockings in affected leg (right leg) or both legs most of the time during the day and take them off at night.    DVT (deep venous thrombosis), right, Postphlebitic syndrome, Chronic anticoagulation, Atrial fibrillation (H)       escitalopram 10 MG tablet    LEXAPRO    90 tablet    TAKE 1 TABLET (10 MG) BY MOUTH DAILY    Major depressive disorder, recurrent episode, moderate (H)       fluticasone 220 MCG/ACT Inhaler    FLOVENT HFA    3 Inhaler    Inhale 2 puffs into the lungs 2 times daily    ILD (interstitial lung disease) (H), Follicular bronchiolitis (H)       fluticasone 50 MCG/ACT spray    FLONASE    16 g    Spray 1-2 sprays into both nostrils daily    Seasonal allergic rhinitis       furosemide 40 MG tablet    LASIX    180 tablet    Take 1 tablet (40 mg) by mouth 2 times daily    (HFpEF) heart failure with preserved ejection fraction (H)       * insulin pen needle 31G X 8 MM    B-D U/F    400 each    Use 4 times daily (with Lantus and Novolog) or as directed.    Type 2 diabetes mellitus without complication, without long-term current use of insulin (H)       * insulin pen needle 32G X 4 MM    BD JANE U/F    200 each    Use 4 daily as directed.    Type 2 diabetes mellitus without complication, without long-term current use of insulin (H)       ketoconazole 2 % cream    NIZORAL     60 g    Apply topically 2 times daily    Cutaneous candidiasis       LANTUS SOLOSTAR 100 UNIT/ML injection   Generic drug:  insulin glargine     15 mL    INJECT 25 UNTIS SUBCUTANEOUSLY EVERY DAY    Type 2 diabetes mellitus with hyperglycemia, with long-term current use of insulin (H)       lisinopril 2.5 MG tablet    PRINIVIL/Zestril    90 tablet    TAKE 1 TABLET (2.5 MG) BY MOUTH DAILY    Essential hypertension with goal blood pressure less than 140/90       metFORMIN 500 MG 24 hr tablet    GLUCOPHAGE-XR    180 tablet    TAKE 2 TABLETS BY MOUTH DAILY WITH DINNER    Type 2 diabetes mellitus without complication, without long-term current use of insulin (H)       metoprolol succinate 100 MG 24 hr tablet    TOPROL-XL    90 tablet    Take 1 tablet (100 mg) by mouth daily    Atrial fibrillation with controlled ventricular response (H)       montelukast 10 MG tablet    SINGULAIR    90 tablet    Take 1 tablet (10 mg) by mouth At Bedtime    Sjogren's syndrome with lung involvement (H), ILD (interstitial lung disease) (H), Chronic seasonal allergic rhinitis due to other allergen       NovoLOG FLEXPEN 100 UNIT/ML injection   Generic drug:  insulin aspart     15 mL    INJECT 12 UNITS SUBCUTANEOUS 3 TIMES DAILY (WITH MEALS)    Type 2 diabetes mellitus with other specified complication (H)       * order for DME     1 Device    Oxygen: Patient requires supplemental Oxygen 4 LPM via nasal canula with activity. Please provide patient with a home unit and with portability capability. Oxygen will be for a lifetime.    Hypoxia, ILD (interstitial lung disease) (H)       * order for DME     1 each    Equipment being ordered: Full face mask for CPAP - size: F10 large    ANGELICA (obstructive sleep apnea)       pantoprazole 20 MG EC tablet    PROTONIX    90 tablet    Take 1-2 tablets (20-40 mg) by mouth daily    Gastroesophageal reflux disease without esophagitis       PARoxetine 10 MG tablet    PAXIL    30 tablet    TAKE 1 TABLET (10MG) BY  MOUTH AT BEDTIME    Major depressive disorder, recurrent episode, moderate (H)       polyethylene glycol Packet    MIRALAX/GLYCOLAX    7 packet    Take 17 g by mouth daily as needed for constipation    Constipation, unspecified constipation type       potassium chloride SA 20 MEQ CR tablet    K-DUR/KLOR-CON M    180 tablet    Take 2 tablets (40 mEq) by mouth daily    (HFpEF) heart failure with preserved ejection fraction (H)       predniSONE 10 MG tablet    DELTASONE    90 tablet    Take 2 tablets for three days (4/12-14), then take 1 tablet daily (10 mg daily starting 4/15)    ILD (interstitial lung disease) (H), Sjogren's syndrome (H)       spironolactone 25 MG tablet    ALDACTONE    180 tablet    Take 2 tablets (50 mg) by mouth daily    Chronic diastolic congestive heart failure (H)       * traZODone 50 MG tablet    DESYREL    90 tablet    TAKE 1/2-1 TABLET BY MOUTH NIGHTLY AS NEEDED, CAN TITRATE DOWN TO 1/2TAB OR UP TO 2TABS AS NEEDED    Insomnia due to medical condition       * traZODone 50 MG tablet    DESYREL    180 tablet    TAKE 1/2-1 TABLET BY MOUTH NIGHTLY AS NEEDED, CAN TITRATE DOWN TO 1/2TAB OR UP TO 2TABS AS NEEDED    Insomnia due to medical condition       TYLENOL 500 MG tablet   Generic drug:  acetaminophen      Take 500 mg by mouth nightly as needed    Dizziness       warfarin 7.5 MG tablet    COUMADIN    90 tablet    TAKE 1 TABLET BY MOUTH DAILY. CURRENT DOSE IS 7.5 MG DAILY. DOSE ADJUSTED PER INR RESULT.    Atrial fibrillation with controlled ventricular response (H)       * Notice:  This list has 13 medication(s) that are the same as other medications prescribed for you. Read the directions carefully, and ask your doctor or other care provider to review them with you.

## 2018-01-10 NOTE — NURSING NOTE
Chief Complaint   Patient presents with     Interstitial Lung Disease (ILD)     Patient is being seen for ILD follow up      Jasmina Mason CMA at 10:25 AM on 1/10/2018

## 2018-01-10 NOTE — PROGRESS NOTES
[facility]   Pulmonary Clinic Follow Up Note  January 10, 2018      Betty Tee MRN# [id number]   Age: 77 year old YOB: 1940     Date of Consultation: January 10, 2018    Primary care provider: Ilene Tristan     History taken from; Patient      Betty Tee is a 77 year old female seen in the Pulmonary Clinic  for f/u.  Chief Complaint   Patient presents with     Interstitial Lung Disease (ILD)     Patient is being seen for ILD follow up   .          Pulmonary Problem List / Reason for follow up:   1. ILD  2. KAMILAH           Assessment and Plan:          ASSESSMENT AND PLAN:  The patient is a 77-year-old lady with a history of interstitial lung disease and bronchiolitis obliterative, coming for the followup visit.   1.  Interstitial lung disease.  At this time, we will continue with 10 mg of prednisone.  The patient is doing very well.  The patient has interstitial lung disease associated with Sjogren's syndrome and the patient is stable.   2.  Bronchiolitis obliterans.  The patient is on albuterol and Flovent.  The patient is doing very well.  The patient's disease is very well controlled.     3.  The patient has poorly controlled atrial fibrillation.  We discussed this problem with the patient's EP team and they will contact the patient to improve the management.       We explained the plan to the patient including the risks and benefits.  The patient expressed understanding and agreed with the plan.       Thank you very much for the opportunity to participate in the care of this very pleasant patient.         Return visit in 6 months      Meño Walsh M.D.         History of Present Illness / Interval History:     CHIEF COMPLAINT:  Bronchiolitis obliterans.       HISTORY OF PRESENT ILLNESS:  The patient is doing very well with regard to her respiratory problems.  The patient's pulmonary function tests are slightly better.  The patient is  using her albuterol once weekly.  Overall, the patient is doing very well with regard to her respiratory problems.  However, the patient is complaining about poorly controlled a-fib with frequent episodes of tachycardia.                 Pulmonary Data:       Exposure history: Asbestos;  No , TB;  No , Radiation;   No          Medications:     Current Outpatient Prescriptions   Medication Sig     metFORMIN (GLUCOPHAGE-XR) 500 MG 24 hr tablet TAKE 2 TABLETS BY MOUTH DAILY WITH DINNER     pantoprazole (PROTONIX) 20 MG EC tablet Take 1-2 tablets (20-40 mg) by mouth daily     warfarin (COUMADIN) 7.5 MG tablet TAKE 1 TABLET BY MOUTH DAILY. CURRENT DOSE IS 7.5 MG DAILY. DOSE ADJUSTED PER INR RESULT.     LANTUS SOLOSTAR 100 UNIT/ML soln INJECT 25 UNTIS SUBCUTANEOUSLY EVERY DAY     escitalopram (LEXAPRO) 10 MG tablet TAKE 1 TABLET (10 MG) BY MOUTH DAILY     order for DME Equipment being ordered: Full face mask for CPAP - size: F10 large     traZODone (DESYREL) 50 MG tablet TAKE 1/2-1 TABLET BY MOUTH NIGHTLY AS NEEDED, CAN TITRATE DOWN TO 1/2TAB OR UP TO 2TABS AS NEEDED     azithromycin (ZITHROMAX) 250 MG tablet Two tablets first day, then one tablet daily for four days.     NOVOLOG FLEXPEN 100 UNIT/ML soln INJECT 12 UNITS SUBCUTANEOUS 3 TIMES DAILY (WITH MEALS)     azithromycin (ZITHROMAX) 250 MG tablet Take 2 tablets (500 mg) the first day, then take 1 tablet (250 mg) by mouth daily for a total of 5 days.     alendronate (FOSAMAX) 70 MG tablet Take 1 tablet (70 mg) by mouth every 7 days     lisinopril (PRINIVIL/ZESTRIL) 2.5 MG tablet TAKE 1 TABLET (2.5 MG) BY MOUTH DAILY     atorvastatin (LIPITOR) 20 MG tablet Take 1 tablet (20 mg) by mouth daily     potassium chloride SA (K-DUR/KLOR-CON M) 20 MEQ CR tablet Take 2 tablets (40 mEq) by mouth daily     azithromycin (ZITHROMAX) 250 MG tablet Two tablets first day, then one tablet daily for four days.     furosemide (LASIX) 40 MG tablet Take 1 tablet (40 mg) by mouth 2 times  daily     PARoxetine (PAXIL) 10 MG tablet TAKE 1 TABLET (10MG) BY MOUTH AT BEDTIME     montelukast (SINGULAIR) 10 MG tablet Take 1 tablet (10 mg) by mouth At Bedtime     spironolactone (ALDACTONE) 25 MG tablet Take 2 tablets (50 mg) by mouth daily     traZODone (DESYREL) 50 MG tablet TAKE 1/2-1 TABLET BY MOUTH NIGHTLY AS NEEDED, CAN TITRATE DOWN TO 1/2TAB OR UP TO 2TABS AS NEEDED     blood glucose monitoring (NO BRAND SPECIFIED) test strip Use to test blood sugars 4 times daily or as directed     blood glucose monitoring (ACCU-CHEK MULTICLIX) lancets Use to test blood sugar 4 times daily or as directed.     insulin pen needle (B-D U/F) 31G X 8 MM Use 4 times daily (with Lantus and Novolog) or as directed.     insulin pen needle (BD JANE U/F) 32G X 4 MM Use 4 daily as directed.     blood glucose monitoring (ACCU-CHEK SHANNON PLUS) test strip Use to test blood sugar 4 times daily or as directed.  Ok to substitute alternative if insurance prefers.     blood glucose monitoring (ACCU-CHEK FASTCLIX) lancets Use to test blood sugar 4 times daily or as directed.  Ok to substitute alternative if insurance prefers.     predniSONE (DELTASONE) 10 MG tablet Take 2 tablets for three days (4/12-14), then take 1 tablet daily (10 mg daily starting 4/15)     ketoconazole (NIZORAL) 2 % cream Apply topically 2 times daily     metoprolol (TOPROL-XL) 100 MG 24 hr tablet Take 1 tablet (100 mg) by mouth daily     albuterol (PROAIR HFA, PROVENTIL HFA, VENTOLIN HFA) 108 (90 BASE) MCG/ACT inhaler Inhale 2 puffs into the lungs every 6 hours     order for DME Oxygen: Patient requires supplemental Oxygen 4 LPM via nasal canula with activity. Please provide patient with a home unit and with portability capability. Oxygen will be for a lifetime.     polyethylene glycol (MIRALAX/GLYCOLAX) packet Take 17 g by mouth daily as needed for constipation     acyclovir (ZOVIRAX) 5 % ointment Apply topically 6 times daily (Patient taking differently: Apply  topically 6 times daily As needed for outbreaks)     fluticasone (FLOVENT HFA) 220 MCG/ACT inhaler Inhale 2 puffs into the lungs 2 times daily     acyclovir (ZOVIRAX) 400 MG tablet Take 400 mg by mouth See Admin Instructions 5 times daily as needed for outbreaks     fluticasone (FLONASE) 50 MCG/ACT nasal spray Spray 1-2 sprays into both nostrils daily (Patient taking differently: Spray 1-2 sprays into both nostrils daily as needed for allergies )     COMPRESSION STOCKINGS Wear compression stockings in affected leg (right leg) or both legs most of the time during the day and take them off at night.     acetaminophen (TYLENOL) 500 MG tablet Take 500 mg by mouth nightly as needed      No current facility-administered medications for this visit.              Past Medical History:     Past Medical History:   Diagnosis Date     Alcohol abuse, in remission      Allergic rhinitis, cause unspecified     allegra helps when she takes it     Antiplatelet or antithrombotic long-term use      Atrial fibrillation (H)     in hosp in 11/11 after surgery w/ fluid overload     Cardiomegaly     LVH on stress echo- cardiac w/u at [facility] ER- neg CT scan for PE, neg stress echo in 8/06     Chest pain, unspecified      Disorder of bone and cartilage, unspecified     osteopenia (had been on prempro), improved on 6/06 dexa, stable dexa 11/10     Diverticulosis of colon (without mention of hemorrhage)     last episode yrs ago     Essential hypertension, benign      Gastro-oesophageal reflux disease      Insomnia, unspecified     weaned off clonazepam     Irregular heart beat      Lumbago 7/09    MRI with DJD, now seeing Dr. Cain for sciatic sx's     Major depressive disorder, recurrent episode, moderate (H)      Obstructive sleep apnea      Osteoarthrosis, unspecified whether generalized or localized, unspecified site      Sjogren's syndrome (H)      Sleep apnea      Tobacco use disorder     chantix in 9/07, started again in 6/08, working              Past Surgical History:     Past Surgical History:   Procedure Laterality Date     BACK SURGERY  1962     BIOPSY BREAST  9/27/02    Biopsy Left Breast     C APPENDECTOMY  1970's?     C NONSPECIFIC PROCEDURE  11/05    exploratory abd lap, adhesions, resolved RLQ pain, diverticulitis episodes     CARDIAC SURGERY       CHOLECYSTECTOMY  1990's?     COLECTOMY LEFT  11/7/2011    Procedure:COLECTOMY LEFT; Laparoscopic mobilization of splenic flexture, sigmoid colectomy, coloprotoscopy, loop illeostomy; Surgeon:CK CASTANEDA; Location:[facility] OR     HYSTERECTOMY TOTAL ABDOMINAL, BILATERAL SALPINGO-OOPHORECTOMY, COMBINED  11/7/2011    Procedure:COMBINED HYSTERECTOMY TOTAL ABDOMINAL, BILATERAL SALPINGO-OOPHORECTOMY; total abdominal hysterectomy, bilateral salpingo-oophorectomy; Surgeon:ALETA MANUEL; Location:[facility] OR     INSERT STENT URETER  11/7/2011    Procedure:INSERT STENT URETER; Placement of Bilateral Ureteral Stents ; Surgeon:PRANEETH BRYANT; Location:[facility] OR     SIGMOIDOSCOPY FLEXIBLE  11/3/2011    Procedure:SIGMOIDOSCOPY FLEXIBLE; Flexible Sigmoidoscopy; Surgeon:CK CASTANEDA; Location:[facility] OR     TAKEDOWN ILEOSTOMY  2/1/2012    Procedure:TAKEDOWN ILEOSTOMY; Takedown Loop Ileostomy ; Surgeon:CK CASTANEDA; Location:[facility] OR             Social History:     Social History     Social History     Marital status: Single     Spouse name: N/A     Number of children: 0     Years of education: Ed Spec De     Occupational History     Professor [employer]     Social History Main Topics     Smoking status: Former Smoker     Packs/day: 0.50     Years: 10.00     Types: Cigarettes     Quit date: 8/1/2011     Smokeless tobacco: Never Used      Comment: 1/2 ppd     Alcohol use No      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     Drug use: No     Sexual activity: No     Other Topics Concern     Not on file     Social History Narrative    Social Documentation:         Balanced Diet: YES    Calcium intake: 1-2 per day    Caffeine: 4-5 cups per day    Exercise:  type of activity limited right now due to foot injury    Sunscreen: No    Seatbelts:  Yes    Self Breast Exam:  Yes    Self Testicular Exam: n/a    Physical/Emotional/Sexual Abuse: No    Do you feel safe in your environment? No-pt lives in [facility]        Cholesterol screen up to date: No-will check today    Eye Exam up to date: Yes    Dental Exam up to date: Yes    Pap smear up to date: Does Not Apply    Mammogram up to date: Yes-10/07    Dexa Scan up to date: Yes-2006    Colonoscopy up to date: Yes-2006    Immunizations up to date: Yes-td 2002    Glucose screen if over 40:  Yes    '09                             Family History:     Family History   Problem Relation Age of Onset     C.A.D. Mother 63     MI- first at age 63     HEART DISEASE Mother      Hypertension Mother      CEREBROVASCULAR DISEASE Mother      Hyperlipidemia Mother      Alcohol/Drug Father      Alzheimer Disease Father      Dementia Father      Hypertension Father      Hyperlipidemia Father      C.A.D. Sister 52     Minor MI- age 50's     HEART DISEASE Sister      Hypertension Sister      Hypertension Sister      Hypertension Brother      Cancer - colorectal Sister 48     Late 40's early 50's     Prostate Cancer Brother 74     Dx'd age 74     GASTROINTESTINAL DISEASE Sister      Diverticulitis     GASTROINTESTINAL DISEASE Brother      Diverticulitis     Lipids Sister      Lipids Sister      Parkinsonism Brother      DIABETES Sister      HEART DISEASE Sister      CHF     CANCER Sister      lung, smoker     Substance Abuse Sister      Substance Abuse Brother      Asthma Sister      CANCER Sister      Breast Cancer Daughter      Prostate Cancer Brother      Hyperlipidemia Brother              Immunization:     Immunization History   Administered Date(s) Administered     Influenza (High Dose) 3 valent vaccine 10/11/2013, 10/14/2014, 12/31/2015, 11/11/2016,  11/08/2017     Influenza (IIV3) PF 11/24/2003, 10/08/2004, 10/28/2005, 11/01/2006, 09/01/2010, 10/17/2011, 09/21/2012     Pneumo Conj 13-V (2010&after) 10/30/2015     Pneumococcal 23 valent 11/03/2010     TD (ADULT, 7+) 01/15/1993, 11/05/2002     TDAP Vaccine (Adacel) 11/03/2010     Zoster vaccine, live 10/05/2009              Review of Systems:   I have done 10 points of review systems and pertinent findings are as above, otherwise negative.             Physical Examination:   General: Alert, oriented, not in distress  B/P: 136/90, T: Data Unavailable, P: 84, R: 16  HEENT: neck supple, symmetrical, no lymph node enlargement, thyromegaly, bruit, JVD, pupils are isocoric and equally responsive to the light.   Lungs: both hemithoraces are symmetrical, normal to palpation, no dullness to percussion, auscultation of lungs revealed normal breathing sounds  CVS: Normal S1 and S2, no additional heart sounds, murmur, rub, normal peripheral pulses  Abdomen: Bowel sounds present, soft, non tender, non distended, no organomegaly, ascitis, mass  Extremities/musculoskeletal: no peripheral edema, deformity, cyanosis, clubbing  Neurology: alert, orientedx3, no motor/sensorial deficits, cranial nerves grossly normal  Skin: no rash  Psychiatry: Mood and affect are appropriate             DATA:     CBC RESULTS:     Recent Labs   Lab Test  11/08/17   1432  10/06/17   1421   WBC  15.6*  11.9*   RBC  4.59  4.54   HGB  13.4  13.2   HCT  42.1  41.0   PLT  264  231       Basic Metabolic Panel:  Recent Labs   Lab Test  10/26/17   1457  10/06/17   1421   NA  136  139   POTASSIUM  3.6  4.3   CHLORIDE  100  104   CO2  29  27   BUN  20  22   CR  0.89  0.94   GLC  158*  147*   CLAUDY  8.8  9.0       INR  Recent Labs   Lab Test  12/19/17   1614 11/14/17   INR  2.78*  2.4*        PFT   PFT Latest Ref Rng & Units 1/10/2018   FVC L 1.88   FEV1 L 1.51   FVC% % 68   FEV1% % 72               Thank you for allowing me participate in the care of Betty VILLALOBOS  Randal.    Meño Walsh M.D.  Associate Professor of Medicine  Pulmonary, Allergy, Critical Care and Sleep

## 2018-01-11 LAB — INR POINT OF CARE: 2.1 (ref 0.86–1.14)

## 2018-01-11 NOTE — TELEPHONE ENCOUNTER
CW  Please see Zhenai messages below.  Patient has follow up appointment with you 1/31.  Do you want to see her sooner?  Sophia Hemphill RN

## 2018-01-12 ENCOUNTER — CARE COORDINATION (OUTPATIENT)
Dept: CARDIOLOGY | Facility: CLINIC | Age: 78
End: 2018-01-12

## 2018-01-12 DIAGNOSIS — I48.91 ATRIAL FIBRILLATION WITH CONTROLLED VENTRICULAR RESPONSE (H): Primary | ICD-10-CM

## 2018-01-12 LAB
DLCOUNC-%PRED-PRE: 75 %
DLCOUNC-PRE: 15.28 ML/MIN/MMHG
DLCOUNC-PRED: 20.27 ML/MIN/MMHG
ERV-%PRED-PRE: 72 %
ERV-PRE: 0.21 L
ERV-PRED: 0.29 L
EXPTIME-PRE: 7.4 SEC
FEF2575-%PRED-PRE: 79 %
FEF2575-PRE: 1.32 L/SEC
FEF2575-PRED: 1.67 L/SEC
FEFMAX-%PRED-PRE: 122 %
FEFMAX-PRE: 6.44 L/SEC
FEFMAX-PRED: 5.24 L/SEC
FEV1-%PRED-PRE: 72 %
FEV1-PRE: 1.51 L
FEV1FEV6-PRE: 81 %
FEV1FEV6-PRED: 78 %
FEV1FVC-PRE: 81 %
FEV1FVC-PRED: 77 %
FEV1SVC-PRE: 82 %
FEV1SVC-PRED: 68 %
FIFMAX-PRE: 2.03 L/SEC
FRCPLETH-%PRED-PRE: 78 %
FRCPLETH-PRE: 2.21 L
FRCPLETH-PRED: 2.8 L
FVC-%PRED-PRE: 68 %
FVC-PRE: 1.88 L
FVC-PRED: 2.75 L
IC-%PRED-PRE: 58 %
IC-PRE: 1.64 L
IC-PRED: 2.8 L
RVPLETH-%PRED-PRE: 88 %
RVPLETH-PRE: 2 L
RVPLETH-PRED: 2.25 L
TLCPLETH-%PRED-PRE: 74 %
TLCPLETH-PRE: 3.85 L
TLCPLETH-PRED: 5.19 L
VA-%PRED-PRE: 62 %
VA-PRE: 3.34 L
VC-%PRED-PRE: 59 %
VC-PRE: 1.85 L
VC-PRED: 3.09 L

## 2018-01-12 NOTE — PROGRESS NOTES
Date: 1/12/2018    Time of Call: 9:05 AM     Diagnosis:  AF     [ TORB ] Ordering provider: Emilia Duenas NP  Order:   Can you have her complete a 2 week zio patch monitor? Pt is reporting her AF is not well controlled.... We want to reassess HRs. She already has f/u in 2/7/17 with Anderson.     Thanks,   LVW      Order received by: Danay Payne RN     Follow-up/additional notes: Msg sent to front to schedule.

## 2018-01-15 DIAGNOSIS — M35.02 SJOGREN'S SYNDROME WITH LUNG INVOLVEMENT (H): ICD-10-CM

## 2018-01-15 DIAGNOSIS — I48.91 ATRIAL FIBRILLATION WITH CONTROLLED VENTRICULAR RESPONSE (H): ICD-10-CM

## 2018-01-15 DIAGNOSIS — J84.9 ILD (INTERSTITIAL LUNG DISEASE) (H): ICD-10-CM

## 2018-01-15 DIAGNOSIS — J30.2 CHRONIC SEASONAL ALLERGIC RHINITIS DUE TO OTHER ALLERGEN: ICD-10-CM

## 2018-01-15 DIAGNOSIS — I10 ESSENTIAL HYPERTENSION WITH GOAL BLOOD PRESSURE LESS THAN 140/90: ICD-10-CM

## 2018-01-15 NOTE — TELEPHONE ENCOUNTER
CW,  Please see pt's response re: biopsy  Do you want to see pt this Tues or Wed?  Please advise.  Lydia HALEY RN

## 2018-01-15 NOTE — TELEPHONE ENCOUNTER
Called pt and discussed...  She said she got a letter to asking her to ask her PCP if she should adjust her anticoagulation and/or diabetic meds prior to FNA coming up this Thursday now.  She should not be under general anesthesia, so should not need to fast, so should be able to do her regular DM medication management.  She's also done the FNA previously  (in '15) without needing to go off the coumadin, so will have her continue on it.    Reviewed her INR results- it looks like the most recent one was checked at the hospital, didn't get into the INR flow sheet, and doesn't look like it got to the INR nurses.  It was normal at 2.1 on 1/11/18.  Will send back to triage to update the flowsheet (or as appropriate).  She has a f/u appt at clinic on 1/31/18, so can recheck INR at that appointment.    Thanks,  CW

## 2018-01-16 ENCOUNTER — TELEPHONE (OUTPATIENT)
Dept: FAMILY MEDICINE | Facility: CLINIC | Age: 78
End: 2018-01-16
Payer: MEDICARE

## 2018-01-16 DIAGNOSIS — I48.91 ATRIAL FIBRILLATION WITH CONTROLLED VENTRICULAR RESPONSE (H): Primary | ICD-10-CM

## 2018-01-16 DIAGNOSIS — I82.409 DVT (DEEP VENOUS THROMBOSIS) (H): ICD-10-CM

## 2018-01-16 PROCEDURE — 99207 ZZC NO CHARGE NURSE ONLY: CPT | Performed by: FAMILY MEDICINE

## 2018-01-16 RX ORDER — METOPROLOL SUCCINATE 100 MG/1
TABLET, EXTENDED RELEASE ORAL
Qty: 90 TABLET | Refills: 3 | Status: SHIPPED | OUTPATIENT
Start: 2018-01-16 | End: 2018-02-16

## 2018-01-16 RX ORDER — LISINOPRIL 2.5 MG/1
TABLET ORAL
Qty: 90 TABLET | Refills: 0 | Status: SHIPPED | OUTPATIENT
Start: 2018-01-16 | End: 2018-04-14

## 2018-01-16 RX ORDER — MONTELUKAST SODIUM 10 MG/1
TABLET ORAL
Qty: 90 TABLET | Refills: 1 | Status: SHIPPED | OUTPATIENT
Start: 2018-01-16 | End: 2018-07-22

## 2018-01-16 NOTE — TELEPHONE ENCOUNTER
"Prescription approved per [facility] Refill Protocol.  Lydia HALEY, RN    Requested Prescriptions   Pending Prescriptions Disp Refills     lisinopril (PRINIVIL/ZESTRIL) 2.5 MG tablet [Pharmacy Med Name: LISINOPRIL 2.5 MG TABLET] 90 tablet 0     Sig: TAKE 1 TABLET (2.5 MG) BY MOUTH DAILY    ACE Inhibitors (Including Combos) Protocol Failed    1/15/2018  8:59 PM       Failed - Blood pressure under 140/90    BP Readings from Last 3 Encounters:   01/10/18 136/90   11/08/17 100/62   10/26/17 113/76                Passed - Recent or future visit with authorizing provider's specialty    Patient had office visit in the last year or has a visit in the next 30 days with authorizing provider.  See \"Patient Info\" tab in inbasket, or \"Choose Columns\" in Meds & Orders section of the refill encounter.              Passed - Patient is age 18 or older       Passed - No active pregnancy on record       Passed - Normal serum creatinine on file in past 12 months    Recent Labs   Lab Test  10/26/17   1457   CR  0.89            Passed - Normal serum potassium on file in past 12 months    Recent Labs   Lab Test  10/26/17   1457   POTASSIUM  3.6            Passed - No positive pregnancy test in past 12 months        Next 5 appointments (look out 90 days)     Jan 31, 2018  1:00 PM CST   Office Visit with Ilene Tristan MD   [facility] ([facility])    [address] [id number] [phone]                  "

## 2018-01-16 NOTE — TELEPHONE ENCOUNTER
"Prescription approved per [facility] Refill Protocol.  Lydia HALEY, RN    Requested Prescriptions   Pending Prescriptions Disp Refills     montelukast (SINGULAIR) 10 MG tablet [Pharmacy Med Name: MONTELUKAST SOD 10 MG TABLET] 90 tablet 1     Sig: TAKE 1 TABLET BY MOUTH AT BEDTIME    Leukotriene Inhibitors Protocol Passed    1/15/2018  8:59 PM       Passed - Patient is age 12 or older    If patient is under 16, ok to refill using age based dosing.          Passed - Recent or future visit with authorizing provider's specialty    Patient had office visit in the last year or has a visit in the next 30 days with authorizing provider.  See \"Patient Info\" tab in inbasket, or \"Choose Columns\" in Meds & Orders section of the refill encounter.               Next 5 appointments (look out 90 days)     Jan 31, 2018  1:00 PM CST   Office Visit with Ilene Tristan MD   [facility]    [address] [id number] [phone]                  "

## 2018-01-17 ENCOUNTER — CARE COORDINATION (OUTPATIENT)
Dept: PULMONOLOGY | Facility: CLINIC | Age: 78
End: 2018-01-17

## 2018-01-17 DIAGNOSIS — J06.9 URI (UPPER RESPIRATORY INFECTION): Primary | ICD-10-CM

## 2018-01-17 RX ORDER — AZITHROMYCIN 250 MG/1
TABLET, FILM COATED ORAL
Qty: 6 TABLET | Refills: 0 | Status: SHIPPED | OUTPATIENT
Start: 2018-01-17 | End: 2018-01-31

## 2018-01-17 NOTE — PROGRESS NOTES
"Telephone Encounter: Incoming    Reason for Call: Started coughing on Friday after visit last week. Lots of production, yellow, feels \"rattly\" in lungs. Denies fever.     Nursing Action/Patient Instruction: Dr Walsh treated with Zpak in the past, plan to treat with Zpak. Informed patient. Told to seek emergency care if symptoms worsen or starts getting high fevers.     Patient Response/Questions/Concerns: Agreed with plan, is suppose to get neck biopsy tomorrow.    Instructed her to contact ENT regarding chest cold.   "

## 2018-01-23 DIAGNOSIS — E11.69 TYPE 2 DIABETES MELLITUS WITH OTHER SPECIFIED COMPLICATION (H): ICD-10-CM

## 2018-01-23 RX ORDER — INSULIN ASPART 100 [IU]/ML
INJECTION, SOLUTION INTRAVENOUS; SUBCUTANEOUS
Qty: 15 ML | Refills: 0 | Status: SHIPPED | OUTPATIENT
Start: 2018-01-23 | End: 2018-03-04

## 2018-01-29 ENCOUNTER — RADIANT APPOINTMENT (OUTPATIENT)
Dept: GENERAL RADIOLOGY | Facility: CLINIC | Age: 78
End: 2018-01-29
Payer: MEDICARE

## 2018-01-29 ENCOUNTER — ALLIED HEALTH/NURSE VISIT (OUTPATIENT)
Dept: CARDIOLOGY | Facility: CLINIC | Age: 78
End: 2018-01-29
Payer: MEDICARE

## 2018-01-29 ENCOUNTER — CARE COORDINATION (OUTPATIENT)
Dept: PULMONOLOGY | Facility: CLINIC | Age: 78
End: 2018-01-29

## 2018-01-29 DIAGNOSIS — I48.91 ATRIAL FIBRILLATION WITH CONTROLLED VENTRICULAR RESPONSE (H): ICD-10-CM

## 2018-01-29 DIAGNOSIS — R05.9 COUGH: Primary | ICD-10-CM

## 2018-01-29 DIAGNOSIS — R05.9 COUGH: ICD-10-CM

## 2018-01-29 PROCEDURE — 0298T ZZC EXT ECG > 48HR TO 21 DAY REVIEW AND INTERPRETATN: CPT | Performed by: INTERNAL MEDICINE

## 2018-01-29 PROCEDURE — 87633 RESP VIRUS 12-25 TARGETS: CPT | Performed by: INTERNAL MEDICINE

## 2018-01-29 PROCEDURE — 0296T ZIO PATCH HOLTER: CPT | Mod: ZF

## 2018-01-29 PROCEDURE — 0298T ZZC EXT ECG > 48HR TO 21 DAY REVIEW AND INTERPRETATN: CPT | Mod: ZP | Performed by: INTERNAL MEDICINE

## 2018-01-29 NOTE — NURSING NOTE
Per Dr. Anderson Perez, patient to have 10 day Zio monitor placed.  Diagnosis: Atrial fibrillation with controlled ventricular response   Monitor placed: Yes  Patient Instructed: Yes  Patient verbalized understanding: Yes  Holter # [id number]; original Zio patch [id number] was placed but ended up being defective, called Nicole and was instructed to send [id number] back  Placed by Mouna Santa

## 2018-01-29 NOTE — PROGRESS NOTES
"Telephone Encounter: Incoming    Reason for Call: Finished antibiotic last week, cough still bad, production more clear but yellow when cough is deep. Was having some sweats and low grade fevers last week, none today. Breathing more \"rough\" and getting tired. Wondering if she needs another antibiotic or can wait to see PCP on Wednesday.    Nursing Action/Patient Instruction: Discussed with Dr Walsh, he would like her to get a chest xray and respiratory virus panel. Informed patient.     Patient Response/Questions/Concerns: Agreed with plan, [place] [facility] [place], will get testing done right after.   "

## 2018-01-29 NOTE — MR AVS SNAPSHOT
After Visit Summary   1/29/2018    Betty Tee    MRN: [id number]           Patient Information     Date Of Birth          1940        Visit Information        Provider Department      1/29/2018 3:00 PM Tech, [facility], [facility]        Today's Diagnoses     Atrial fibrillation with controlled ventricular response (H)           Follow-ups after your visit        Your next 10 appointments already scheduled     Jan 31, 2018  1:00 PM CST   Office Visit with Ilene Tristan MD   [facility] ([facility])    [address] [place] [id number] [phone]           Bring a current list of meds and any records pertaining to this visit. For Physicals, please bring immunization records and any forms needing to be filled out. Please arrive 10 minutes early to complete paperwork.            Feb 07, 2018 11:00 AM CST   (Arrive by 10:45 AM)   US BIOPSY FINE NEEDLE ASPIRATION LYMPH NODE with SHAYYUS3, SHAYY IMAGING NURSE, SHAYY PROCEDURAL [facility] ([facility])    [address] [id number] [phone]           Tell us in advance if there s any chance you may be pregnant.  Bring a list of your medicines to the exam. Include vitamins, minerals and over-the-counter drugs.  If you take blood thinners, you may need to stop taking them a few days before treatment. Talk to your doctor before stopping these medicines. You will need a blood test the morning of your exam.   Stop taking Coumadin (warfarin) 3 days before your exam. Restart the day after your exam.   If you take aspirin, you may need to stop taking it 3 days before your scan.   If you take Plavix, Ticlid, Pletal or Persantine, you may need to stop taking them 5 days before your scan. Please talk to your doctor before stopping these medicines.  If you will receive sedation for this test (medicine to help you  relax):   See your family doctor for an exam within 30 days of treatment.   Plan for an adult to drive you home and stay with you for at least 6 hours.   Follow the eating and drinking guidelines checked below:   No eating or drinking for 4 hours before your test. You may take medicine with small sips of water.   If you have diabetes:If you take insulin, call your diabetes care team. Do not take diabetes pills on the morning of your test. If you take metformin (Avandamet, Glucophage, Glucovance, Metaglip) and received contrast, wait 48 hours before re-starting this medicine.  Please call the Imaging Department at your exam site with any questions.            Feb 21, 2018  3:30 PM CST   (Arrive by 3:15 PM)   NEW ARRHYTHMIA with Anderson Perez MD   [facility] ([facility]    [address] [place] [address]  [address]  [facility] [address] [phone]            Mar 08, 2018  3:00 PM CST   (Arrive by 2:45 PM)   Return Visit with Carmenza Stringer MD   [facility] ([facility])    [address] [place] [address]  [address]  [facility] [address] [phone]            Apr 26, 2018  3:00 PM CDT   Lab with  LAB   [facility] ([facility])    [address] [place] [address]  1st Floor  [facility] [address]   [phone]            Apr 26, 2018  3:30 PM CDT   (Arrive by 3:15 PM)   RETURN HEART FAILURE with Radha Rosa MD   [facility])    [address] [place] [address]  [address]  [facility] [address] [phone]            Jul 18, 2018 12:30 PM CDT   FULL PULMONARY FUNCTION with  PFL C   [facility] ([facility])    [address] [place] [address]  3rd Floor  [facility] [address] [phone]            Jul 18, 2018  1:30 PM CDT   (Arrive by 1:15 PM)   Return Interstitial Lung with Meño Walsh MD   [facility]  [facility] ([facility])    [address] [facility]  [address]  [facility] [address] [phone]              Who to contact     If you have questions or need follow up information about today's clinic visit or your schedule please contact [facility] directly at [phone].  Normal or non-critical lab and imaging results will be communicated to you by MyChart, letter or phone within 4 business days after the clinic has received the results. If you do not hear from us within 7 days, please contact the clinic through [employer]azesh[employer] or phone. If you have a critical or abnormal lab result, we will notify you by phone as soon as possible.  Submit refill requests through [email or web address] or call your pharmacy and they will forward the refill request to us. Please allow 3 business days for your refill to be completed.          Additional Information About Your Visit        Pl[employer] Information     [email or web address] gives you secure access to your electronic health record. If you see a primary care provider, you can also send messages to your care team and make appointments. If you have questions, please call your primary care clinic.  If you do not have a primary care provider, please call [phone] and they will assist you.        Care EveryWhere ID     This is your Care EveryWhere ID. This could be used by other organizations to access your [facility] medical records  [id number]         Blood Pressure from Last 3 Encounters:   01/10/18 136/90   11/08/17 100/62   10/26/17 113/76    Weight from Last 3 Encounters:   11/08/17 93.9 kg (207 lb)   10/26/17 95.4 kg (210 lb 4.8 oz)   10/19/17 95.3 kg (210 lb 1.6 oz)              We Performed the Following     Zio Patch Holter          Today's Medication Changes          These changes are accurate as of 1/29/18  4:25 PM.  If you have any questions, ask your nurse or doctor.               These medicines have changed or have updated prescriptions.        Dose/Directions     acyclovir 5 % ointment   Commonly known as:  ZOVIRAX   This may have changed:  additional instructions   Used for:  Recurrent cold sores        Apply topically 6 times daily   Quantity:  15 g   Refills:  3       fluticasone 50 MCG/ACT spray   Commonly known as:  FLONASE   This may have changed:    - when to take this  - reasons to take this   Used for:  Seasonal allergic rhinitis        Dose:  1-2 spray   Spray 1-2 sprays into both nostrils daily   Quantity:  16 g   Refills:  5                Primary Care Provider Office Phone # Fax #    Ilene Tristan [phone] [address] [facility]  [address]  [facility] [address]        Equal Access to Services     DICK STONER : Hadii jossue sellers hadumairo Sojefry, waaxda luqadaha, qaybta kaalmada adestasyada, anderson mercedes . So [facility] [phone].    ATENCIÓN: Si habla español, tiene a conti disposición servicios gratuitos de asistencia lingüística. Llame al [phone].    We comply with applicable federal civil rights laws and [place] laws. We do not discriminate on the basis of race, color, national origin, age, disability, sex, sexual orientation, or gender identity.            Thank you!     Thank you for choosing [facility]  for your care. Our goal is always to provide you with excellent care. Hearing back from our patients is one way we can continue to improve our services. Please take a few minutes to complete the written survey that you may receive in the mail after your visit with us. Thank you!             Your Updated Medication List - Protect others around you: Learn how to safely use, store and throw away your medicines at www.disposemymeds.org.          This list is accurate as of 1/29/18  4:25 PM.  Always use your most recent med list.                   Brand Name Dispense Instructions for use Diagnosis    acyclovir 400 MG tablet    ZOVIRAX     Take 400 mg by mouth See Admin Instructions 5 times daily as  needed for outbreaks        acyclovir 5 % ointment    ZOVIRAX    15 g    Apply topically 6 times daily    Recurrent cold sores       albuterol 108 (90 BASE) MCG/ACT Inhaler    PROAIR HFA/PROVENTIL HFA/VENTOLIN HFA    1 Inhaler    Inhale 2 puffs into the lungs every 6 hours    ILD (interstitial lung disease) (H)       alendronate 70 MG tablet    FOSAMAX    4 tablet    Take 1 tablet (70 mg) by mouth every 7 days    Other osteoporosis without current pathological fracture       atorvastatin 20 MG tablet    LIPITOR    90 tablet    Take 1 tablet (20 mg) by mouth daily    Hyperlipidemia LDL goal <100       * azithromycin 250 MG tablet    ZITHROMAX    6 tablet    Two tablets first day, then one tablet daily for four days.    Acute recurrent maxillary sinusitis       * azithromycin 250 MG tablet    ZITHROMAX    6 tablet    Take 2 tablets (500 mg) the first day, then take 1 tablet (250 mg) by mouth daily for a total of 5 days.    ILD (interstitial lung disease) (H)       * azithromycin 250 MG tablet    ZITHROMAX    6 tablet    Two tablets first day, then one tablet daily for four days.    Acute bronchitis, unspecified organism, Other elevated white blood cell (WBC) count, ILD (interstitial lung disease) (H)       * azithromycin 250 MG tablet    ZITHROMAX    6 tablet    Take 2 tablets (500 mg) the first day, then take 1 tablet (250 mg) by mouth daily for a total of 5 days.    URI (upper respiratory infection)       * blood glucose monitoring lancets     4 Box    Use to test blood sugar 4 times daily or as directed.    Type 2 diabetes mellitus without complication, without long-term current use of insulin (H)       * blood glucose monitoring lancets     1 Box    Use to test blood sugar 4 times daily or as directed.  Ok to substitute alternative if insurance prefers.    Type 2 diabetes mellitus without complication, without long-term current use of insulin (H)       * blood glucose monitoring test strip    no brand specified     300 strip    Use to test blood sugars 4 times daily or as directed    Type 2 diabetes mellitus without complication, without long-term current use of insulin (H)       * blood glucose monitoring test strip    ACCU-CHEK SHANNON PLUS    120 strip    Use to test blood sugar 4 times daily or as directed.  Ok to substitute alternative if insurance prefers.    Type 2 diabetes mellitus without complication, without long-term current use of insulin (H)       COMPRESSION STOCKINGS     2 each    Wear compression stockings in affected leg (right leg) or both legs most of the time during the day and take them off at night.    DVT (deep venous thrombosis), right, Postphlebitic syndrome, Chronic anticoagulation, Atrial fibrillation (H)       escitalopram 10 MG tablet    LEXAPRO    90 tablet    TAKE 1 TABLET (10 MG) BY MOUTH DAILY    Major depressive disorder, recurrent episode, moderate (H)       fluticasone 220 MCG/ACT Inhaler    FLOVENT HFA    3 Inhaler    Inhale 2 puffs into the lungs 2 times daily    ILD (interstitial lung disease) (H), Follicular bronchiolitis (H)       fluticasone 50 MCG/ACT spray    FLONASE    16 g    Spray 1-2 sprays into both nostrils daily    Seasonal allergic rhinitis       furosemide 40 MG tablet    LASIX    180 tablet    Take 1 tablet (40 mg) by mouth 2 times daily    (HFpEF) heart failure with preserved ejection fraction (H)       * insulin pen needle 31G X 8 MM    B-D U/F    400 each    Use 4 times daily (with Lantus and Novolog) or as directed.    Type 2 diabetes mellitus without complication, without long-term current use of insulin (H)       * insulin pen needle 32G X 4 MM    BD JANE U/F    200 each    Use 4 daily as directed.    Type 2 diabetes mellitus without complication, without long-term current use of insulin (H)       ketoconazole 2 % cream    NIZORAL    60 g    Apply topically 2 times daily    Cutaneous candidiasis       LANTUS SOLOSTAR 100 UNIT/ML injection   Generic drug:  insulin  glargine     15 mL    INJECT 25 UNTIS SUBCUTANEOUSLY EVERY DAY    Type 2 diabetes mellitus with hyperglycemia, with long-term current use of insulin (H)       lisinopril 2.5 MG tablet    PRINIVIL/Zestril    90 tablet    TAKE 1 TABLET (2.5 MG) BY MOUTH DAILY    Essential hypertension with goal blood pressure less than 140/90       metFORMIN 500 MG 24 hr tablet    GLUCOPHAGE-XR    180 tablet    TAKE 2 TABLETS BY MOUTH DAILY WITH DINNER    Type 2 diabetes mellitus without complication, without long-term current use of insulin (H)       metoprolol succinate 100 MG 24 hr tablet    TOPROL-XL    90 tablet    TAKE 1 TABLET (100 MG) BY MOUTH DAILY    Atrial fibrillation with controlled ventricular response (H)       montelukast 10 MG tablet    SINGULAIR    90 tablet    TAKE 1 TABLET BY MOUTH AT BEDTIME    Sjogren's syndrome with lung involvement (H), ILD (interstitial lung disease) (H), Chronic seasonal allergic rhinitis due to other allergen       NovoLOG FLEXPEN 100 UNIT/ML injection   Generic drug:  insulin aspart     15 mL    INJECT 12 UNITS SUBCUTANEOUS 3 TIMES DAILY (WITH MEALS)    Type 2 diabetes mellitus with other specified complication (H)       * order for DME     1 Device    Oxygen: Patient requires supplemental Oxygen 4 LPM via nasal canula with activity. Please provide patient with a home unit and with portability capability. Oxygen will be for a lifetime.    Hypoxia, ILD (interstitial lung disease) (H)       * order for DME     1 each    Equipment being ordered: Full face mask for CPAP - size: F10 large    ANGELICA (obstructive sleep apnea)       pantoprazole 20 MG EC tablet    PROTONIX    90 tablet    Take 1-2 tablets (20-40 mg) by mouth daily    Gastroesophageal reflux disease without esophagitis       PARoxetine 10 MG tablet    PAXIL    30 tablet    TAKE 1 TABLET (10MG) BY MOUTH AT BEDTIME    Major depressive disorder, recurrent episode, moderate (H)       polyethylene glycol Packet    MIRALAX/GLYCOLAX    7  packet    Take 17 g by mouth daily as needed for constipation    Constipation, unspecified constipation type       potassium chloride SA 20 MEQ CR tablet    K-DUR/KLOR-CON M    180 tablet    Take 2 tablets (40 mEq) by mouth daily    (HFpEF) heart failure with preserved ejection fraction (H)       predniSONE 10 MG tablet    DELTASONE    90 tablet    Take 2 tablets for three days (4/12-14), then take 1 tablet daily (10 mg daily starting 4/15)    ILD (interstitial lung disease) (H), Sjogren's syndrome (H)       spironolactone 25 MG tablet    ALDACTONE    180 tablet    Take 2 tablets (50 mg) by mouth daily    Chronic diastolic congestive heart failure (H)       * traZODone 50 MG tablet    DESYREL    90 tablet    TAKE 1/2-1 TABLET BY MOUTH NIGHTLY AS NEEDED, CAN TITRATE DOWN TO 1/2TAB OR UP TO 2TABS AS NEEDED    Insomnia due to medical condition       * traZODone 50 MG tablet    DESYREL    180 tablet    TAKE 1/2-1 TABLET BY MOUTH NIGHTLY AS NEEDED, CAN TITRATE DOWN TO 1/2TAB OR UP TO 2TABS AS NEEDED    Insomnia due to medical condition       TYLENOL 500 MG tablet   Generic drug:  acetaminophen      Take 500 mg by mouth nightly as needed    Dizziness       warfarin 7.5 MG tablet    COUMADIN    90 tablet    TAKE 1 TABLET BY MOUTH DAILY. CURRENT DOSE IS 7.5 MG DAILY. DOSE ADJUSTED PER INR RESULT.    Atrial fibrillation with controlled ventricular response (H)       * Notice:  This list has 14 medication(s) that are the same as other medications prescribed for you. Read the directions carefully, and ask your doctor or other care provider to review them with you.

## 2018-01-30 ENCOUNTER — CARE COORDINATION (OUTPATIENT)
Dept: PULMONOLOGY | Facility: CLINIC | Age: 78
End: 2018-01-30

## 2018-01-30 ENCOUNTER — TELEPHONE (OUTPATIENT)
Dept: CARDIOLOGY | Facility: CLINIC | Age: 78
End: 2018-01-30

## 2018-01-30 DIAGNOSIS — I50.30 (HFPEF) HEART FAILURE WITH PRESERVED EJECTION FRACTION (H): ICD-10-CM

## 2018-01-30 LAB
FLUAV H1 2009 PAND RNA SPEC QL NAA+PROBE: NEGATIVE
FLUAV H1 RNA SPEC QL NAA+PROBE: NEGATIVE
FLUAV H3 RNA SPEC QL NAA+PROBE: NEGATIVE
FLUAV RNA SPEC QL NAA+PROBE: NEGATIVE
FLUBV RNA SPEC QL NAA+PROBE: NEGATIVE
HADV DNA SPEC QL NAA+PROBE: NEGATIVE
HADV DNA SPEC QL NAA+PROBE: NEGATIVE
HMPV RNA SPEC QL NAA+PROBE: NEGATIVE
HPIV1 RNA SPEC QL NAA+PROBE: NEGATIVE
HPIV2 RNA SPEC QL NAA+PROBE: NEGATIVE
HPIV3 RNA SPEC QL NAA+PROBE: NEGATIVE
MICROBIOLOGIST REVIEW: NORMAL
RHINOVIRUS RNA SPEC QL NAA+PROBE: NEGATIVE
RSV RNA SPEC QL NAA+PROBE: NEGATIVE
RSV RNA SPEC QL NAA+PROBE: NEGATIVE
SPECIMEN SOURCE: NORMAL

## 2018-01-30 NOTE — PROGRESS NOTES
Telephone Encounter: Outgoing    Reason for Outgoing Call: Dr Walsh reviewed Chest xray, does not show an infection, does show evidence of pulmonary edema, need to be address by Cardiology.     Nursing Action/Patient Instruction: Informed patient.    Patient Response/Questions/Concerns: She will contact Cardiologist.

## 2018-01-30 NOTE — TELEPHONE ENCOUNTER
Patient calling reporting she saw her pulmonologist yesterday.Today Dr. Edward called her with a diagnosis of pulmonary edema.  Dr. Edward wanted patient to call cardiology and let her  doctors know of this diagnosis.

## 2018-01-31 ENCOUNTER — OFFICE VISIT (OUTPATIENT)
Dept: FAMILY MEDICINE | Facility: CLINIC | Age: 78
End: 2018-01-31
Payer: MEDICARE

## 2018-01-31 ENCOUNTER — TELEPHONE (OUTPATIENT)
Dept: FAMILY MEDICINE | Facility: CLINIC | Age: 78
End: 2018-01-31

## 2018-01-31 VITALS
OXYGEN SATURATION: 96 % | DIASTOLIC BLOOD PRESSURE: 68 MMHG | BODY MASS INDEX: 33.37 KG/M2 | HEIGHT: 67 IN | SYSTOLIC BLOOD PRESSURE: 105 MMHG | HEART RATE: 83 BPM | TEMPERATURE: 98.8 F | WEIGHT: 212.6 LBS

## 2018-01-31 DIAGNOSIS — R06.02 SOB (SHORTNESS OF BREATH): ICD-10-CM

## 2018-01-31 DIAGNOSIS — Z79.4 TYPE 2 DIABETES MELLITUS WITH HYPERGLYCEMIA, WITH LONG-TERM CURRENT USE OF INSULIN (H): ICD-10-CM

## 2018-01-31 DIAGNOSIS — E11.65 TYPE 2 DIABETES MELLITUS WITH HYPERGLYCEMIA, WITH LONG-TERM CURRENT USE OF INSULIN (H): ICD-10-CM

## 2018-01-31 DIAGNOSIS — R22.1 NECK MASS: ICD-10-CM

## 2018-01-31 DIAGNOSIS — I48.21 PERMANENT ATRIAL FIBRILLATION (H): ICD-10-CM

## 2018-01-31 DIAGNOSIS — R05.9 COUGH: Primary | ICD-10-CM

## 2018-01-31 DIAGNOSIS — F33.1 MAJOR DEPRESSIVE DISORDER, RECURRENT EPISODE, MODERATE (H): ICD-10-CM

## 2018-01-31 DIAGNOSIS — Z79.01 LONG TERM CURRENT USE OF ANTICOAGULANT THERAPY: Primary | ICD-10-CM

## 2018-01-31 DIAGNOSIS — E78.5 HYPERLIPIDEMIA LDL GOAL <100: ICD-10-CM

## 2018-01-31 DIAGNOSIS — I50.30 (HFPEF) HEART FAILURE WITH PRESERVED EJECTION FRACTION (H): ICD-10-CM

## 2018-01-31 DIAGNOSIS — J01.90 ACUTE SINUSITIS WITH SYMPTOMS > 10 DAYS: ICD-10-CM

## 2018-01-31 LAB
HBA1C MFR BLD: 7 % (ref 4.3–6)
INR POINT OF CARE: 1.6 (ref 0.86–1.14)
NT-PROBNP SERPL-MCNC: 460 PG/ML (ref 0–450)

## 2018-01-31 PROCEDURE — 99214 OFFICE O/P EST MOD 30 MIN: CPT | Performed by: FAMILY MEDICINE

## 2018-01-31 PROCEDURE — 83036 HEMOGLOBIN GLYCOSYLATED A1C: CPT | Performed by: FAMILY MEDICINE

## 2018-01-31 PROCEDURE — 83880 ASSAY OF NATRIURETIC PEPTIDE: CPT | Performed by: FAMILY MEDICINE

## 2018-01-31 PROCEDURE — 80048 BASIC METABOLIC PNL TOTAL CA: CPT | Performed by: FAMILY MEDICINE

## 2018-01-31 PROCEDURE — 99207 ZZC NO CHARGE NURSE ONLY: CPT | Performed by: FAMILY MEDICINE

## 2018-01-31 PROCEDURE — 36415 COLL VENOUS BLD VENIPUNCTURE: CPT | Performed by: FAMILY MEDICINE

## 2018-01-31 RX ORDER — AZITHROMYCIN 250 MG/1
TABLET, FILM COATED ORAL
Qty: 6 TABLET | Refills: 0 | Status: SHIPPED | OUTPATIENT
Start: 2018-01-31 | End: 2018-02-21

## 2018-01-31 NOTE — PATIENT INSTRUCTIONS
Will check BNP today to see if heart failure labs worse.  Discuss with cardiology- call in already them.    Will check A1C today for Diabetes control.   Follow-up sooner abnormal.    Follow-up in ~2 months if all is well.    With upcoming INR lab visit, come fasting to also check lipids/cholesterol.

## 2018-01-31 NOTE — TELEPHONE ENCOUNTER
ANTICOAGULATION FOLLOW-UP CLINIC VISIT    Patient Name:  Betty Tee  Date:  1/31/2018  Contact Type:  Telephone    SUBJECTIVE:  Bleeding Signs/Symptoms: Minor - Bloody nose while on antibiotics  Thromboembolic Signs/Symptoms: None    Medication Changes:  No  Dietary Changes:  No  Bacterial/Viral Infection:  No    Missed Coumadin Doses:  None  Other Concerns:  No      ASSESSMENT/PLAN:  See: ANTICOAGULATION QIC flow sheet.    LVM with pt to take 11.25 mg for one day, then 7.5 mg daily, 52.5 mg/week, recheck INR in 2 weeks, if any questions call back to triage.      JIMI VOTG

## 2018-01-31 NOTE — PROGRESS NOTES
SUBJECTIVE:   Betty Tee is a 77 year old female who presents to clinic today for the following health issues:  Pt here to follow up - states she saw the pulmonologist 2 days ago and had a chest X-ray which was abnormal.  Neg Influenza test - still coughing gunk.  Zio patch placed 2 days ago as well.  ________________________    ----Current concerns/issues- cough and palpitations...    1/10/18- Saw Dr. Walsh- her breathing was improved at that time.  He recommended she see Dr. Alvarado- has appt with him on 2/21/18.  Has ziopatch placed two days ago, off on 2/8/18.  He'll review it at f/u appt.    Cough- going on x 2-3 wks (started after that 1/10 appt),   Productive with phlegm, which gets stuck.  Yellow in color.  Had fevers/chills at beginning and slept all the time for a couple weeks.  Getting better, but not getting rid of the crud.    Called and spoke with Dr. Walsh's nurse- 1/17/18.  He had her start azithromycin, and she started feeling better towards the end of the course.    At pulmonary office two days ago for the ziopatch, pt c/o cough, nurse spoke with Dr. Walsh.  Got swab- negative for many viruses including influenza and RSV, parainfluenza.  CXR done- large cardiac sillouette, engorged pulmonary vascularity.  No airspace disease.  She has not heard about f/u on the testing yet.    Breathing- has been using oxygen (prior hadn't been using them hardly at all).  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  Didn't need it when lying aroud for the most part, just after going up stairs, making bed, any exertion.    Palpitations-  Pt also feels like the a.fib has been crazy.  One night, two days, rough a.fib.  Used the CPAP at night those night.      ---Chronic cares updates...    --DM-   A1C 7.3 in 10/17- will recheck today.  She thinks her glucose readings have been okay.   134 in morning, sometimes down at noon, in  "evening up to 178.  Always under 200.  Lowest 89, unsure if sx's.  Taking her medications consistently, no se's.    --Lipids-  Lipitor lowered from 40mg/d to 20mg/d in 8/17 due to LDL of 17- need lipid recheck.  Pt will do with next fasting lab check.    --Neck nodule- has bx coming up - through radiology, per Dr. Polk.    --MDD-  Switched from paxil to lexapro.  Felt pretty even with the paxil.  With the lexapro, could feel more disgruntled, easier, especially soon after the switch.  Feels like it's leveling off now, and she's feeling more 'even'.       Problem list and histories reviewed & adjusted, as indicated.  Additional history: as documented    Labs reviewed in EPIC    Reviewed and updated as needed this visit by clinical staff  Allergies  Meds       Reviewed and updated as needed this visit by Provider         ROS:  Constitutional, HEENT, cardiovascular, pulmonary, gi and gu systems are negative, except as otherwise noted.    OBJECTIVE:     /68  Pulse 83  Temp 98.8  F (37.1  C) (Oral)  Ht 5' 7\" (1.702 m)  Wt 212 lb 9.6 oz (96.4 kg)  SpO2 96%  Breastfeeding? No  BMI 33.3 kg/m2  Body mass index is 33.3 kg/(m^2).  GENERAL APPEARANCE: healthy, alert and no distress     EYES: PERRL, sclera clear     HENT: nose and mouth without ulcers or lesions     NECK: no adenopathy, no asymmetry, masses, or scars and thyroid normal to palpation     RESP: lungs clear to auscultation - no rales, rhonchi or wheezes     CV: regular rates and rhythm, normal S1 S2, no S3 or S4 and no murmur, click or rub      Abdomen: soft, nontender, no HSM or masses and bowel sounds normal     Ext: warm, dry, no edema     Diagnostic Test Results:  Results for orders placed or performed in visit on 01/31/18   BNP-N terminal pro   Result Value Ref Range    N-Terminal Pro Bnp 460 (H) 0 - 450 pg/mL   Hemoglobin A1c   Result Value Ref Range    Hemoglobin A1C 7.0 (H) 4.3 - 6.0 %   Basic metabolic panel  (Ca, Cl, CO2, Creat, Gluc, K, " Na, BUN)   Result Value Ref Range    Sodium 135 133 - 144 mmol/L    Potassium 4.5 3.4 - 5.3 mmol/L    Chloride 101 94 - 109 mmol/L    Carbon Dioxide 27 20 - 32 mmol/L    Anion Gap 7 3 - 14 mmol/L    Glucose 100 (H) 70 - 99 mg/dL    Urea Nitrogen 27 7 - 30 mg/dL    Creatinine 0.96 0.52 - 1.04 mg/dL    GFR Estimate 56 (L) >60 mL/min/1.7m2    GFR Estimate If Black 68 >60 mL/min/1.7m2    Calcium 9.5 8.5 - 10.1 mg/dL     *Note: Due to a large number of results and/or encounters for the requested time period, some results have not been displayed. A complete set of results can be found in Results Review.       ASSESSMENT/PLAN:       ICD-10-CM    1. Cough R05    2. Acute sinusitis with symptoms > 10 days J01.90 DISCONTINUED: azithromycin (ZITHROMAX) 250 MG tablet   3. SOB (shortness of breath) R06.02 BNP-N terminal pro     Basic metabolic panel  (Ca, Cl, CO2, Creat, Gluc, K, Na, BUN)   4. (HFpEF) heart failure with preserved ejection fraction (H) I50.30 BNP-N terminal pro     Basic metabolic panel  (Ca, Cl, CO2, Creat, Gluc, K, Na, BUN)   5. Permanent atrial fibrillation (H) I48.2    6. Neck mass R22.1    7. Type 2 diabetes mellitus with hyperglycemia, with long-term current use of insulin (H) E11.65 Hemoglobin A1c    Z79.4 Basic metabolic panel  (Ca, Cl, CO2, Creat, Gluc, K, Na, BUN)   8. Hyperlipidemia LDL goal <100 E78.5 Lipid panel reflex to direct LDL Fasting   9. Major depressive disorder, recurrent episode, moderate F33.1    10. Major depressive disorder, recurrent episode, moderate (H) F33.1      Cough s/p URI with fevers/chills 2 wks ago, now improved except for productive cough with deep phlegm.  Mild headaches and sinus pressure.  SOB with exertion since the onset of sx's- using oxygen if walking.  Did azithro course at 1 wk into course.  Would like to try another course in case of sinus infection (nasal viral panel negative two days ago and CXR negative except for pulmonary vascular congestion).  Sent in rx.   Risks and benefits of medication(s) including potential side effects reviewed with patient.  Questions answered.     CHF/a.fib- pulmonary congestion on xray from 2 days ago, difficult to say how it compares to previous xrays.  SOB likely due to recent URI/Cough, but could be from worsening heart failure.  Will check BNP today.  Pt has call out to cardiology team- will send BNP results their way as well for their expertise.  She is scheduled to see them for worsening a.fib as well and regular cardiology f/u in the next month.  Ziopatch placed two days ago.    Neck nodule- bx coming up.  Will cont on coumadin.    DM- A1C today better at 7.0.  Pt thought it would look good due to good control on glucose readings.  Will cont current medications.    Lipids- Lipitor dose lowered in 8/17 due to LDL of 17.  Needs lipid recheck.  Not fasting today.  Rec coming in fasting to next INR check and check lipids then.    HTN/CKD- good blood pressure control.  GFR stable but lower than in the past.    MDD- switch from long-term use of paxil to lexapro due to MTM concerns of se's with age.  Pt thinks mood has been 'rougher' during the transition, but may be 'smoothing out' recently.  Will cont to monitor mood/irritability.      Rec f/u in 2 months if doing well, sooner if concerns.      Ilene Tristan MD  [facility]      Patient Instructions   Will check BNP today to see if heart failure labs worse.  Discuss with cardiology- call in already them.    Will check A1C today for Diabetes control.   Follow-up sooner abnormal.    Follow-up in ~2 months if all is well.    With upcoming INR lab visit, come fasting to also check lipids/cholesterol.

## 2018-01-31 NOTE — MR AVS SNAPSHOT
After Visit Summary   1/31/2018    Betty Tee    MRN: [id number]           Patient Information     Date Of Birth          1940        Visit Information        Provider Department      1/31/2018 1:00 PM Ilene Tristan MD [facility]        Today's Diagnoses     Cough    -  1    SOB (shortness of breath)        (HFpEF) heart failure with preserved ejection fraction (H)        Type 2 diabetes mellitus with hyperglycemia, with long-term current use of insulin (H)        Hyperlipidemia LDL goal <100        Acute sinusitis with symptoms > 10 days          Care Instructions    Will check BNP today to see if heart failure labs worse.  Discuss with cardiology- call in already them.    Will check A1C today for Diabetes control.   Follow-up sooner abnormal.    Follow-up in ~2 months if all is well.    With upcoming INR lab visit, come fasting to also check lipids/cholesterol.          Follow-ups after your visit        Your next 10 appointments already scheduled     Feb 07, 2018 11:00 AM CST   (Arrive by 10:45 AM)   US BIOPSY FINE NEEDLE ASPIRATION LYMPH NODE with UCUS3,  IMAGING NURSE, UC PROCEDURAL RAD   [facility] ([facility])    [address] [place] [address] [phone]           Tell us in advance if there s any chance you may be pregnant.  Bring a list of your medicines to the exam. Include vitamins, minerals and over-the-counter drugs.  If you take blood thinners, you may need to stop taking them a few days before treatment. Talk to your doctor before stopping these medicines. You will need a blood test the morning of your exam.   Stop taking Coumadin (warfarin) 3 days before your exam. Restart the day after your exam.   If you take aspirin, you may need to stop taking it 3 days before your scan.   If you take Plavix, Ticlid, Pletal or Persantine, you may need to stop taking them 5 days before your  scan. Please talk to your doctor before stopping these medicines.  If you will receive sedation for this test (medicine to help you relax):   See your family doctor for an exam within 30 days of treatment.   Plan for an adult to drive you home and stay with you for at least 6 hours.   Follow the eating and drinking guidelines checked below:   No eating or drinking for 4 hours before your test. You may take medicine with small sips of water.   If you have diabetes:If you take insulin, call your diabetes care team. Do not take diabetes pills on the morning of your test. If you take metformin (Avandamet, Glucophage, Glucovance, Metaglip) and received contrast, wait 48 hours before re-starting this medicine.  Please call the Imaging Department at your exam site with any questions.            Feb 21, 2018  3:30 PM CST   (Arrive by 3:15 PM)   NEW ARRHYTHMIA with Anderson Perez MD   [facility] ([facility])    [address] [place] [address]  [address]  [facility] [address] [phone]            Mar 08, 2018  3:00 PM CST   (Arrive by 2:45 PM)   Return Visit with Carmenza Stringer MD   [facility] ([facility])    [address]  [address]  [facility] [address] [phone]            Apr 26, 2018  3:00 PM CDT   Lab with  LAB   [facility] Lab ([facility])    [address] [place] [address]  1st Floor  [facility] [address] [phone]            Apr 26, 2018  3:30 PM CDT   (Arrive by 3:15 PM)   RETURN HEART FAILURE with Radha Rosa MD   [facility] ([facility])    [address] [place] [address]  [address]  [facility] [address] [phone]            Jul 18, 2018 12:30 PM CDT   FULL PULMONARY FUNCTION with  PFL C   [facility] Pulmonary Function Testing ([facility])    [address] [place] [address]  3rd Floor  [facility] [id number] [phone]             "Jul 18, 2018  1:30 PM CDT   (Arrive by 1:15 PM)   Return Interstitial Lung with Meño Walsh MD   [facility] ([facility])    [address]  [address] [place] [address] [phone]              Future tests that were ordered for you today     Open Future Orders        [facility] Expected Expires Ordered    Lipid panel reflex to direct LDL Fasting Routine 1/31/2018 1/31/2019 1/31/2018            Who to contact     If you have questions or need follow up information about today's clinic visit or your schedule please contact [facility] directly at [phone].  Normal or non-critical lab and imaging results will be communicated to you by MyChart, letter or phone within 4 business days after the clinic has received the results. If you do not hear from us within 7 days, please contact the clinic through [facility] or phone. If you have a critical or abnormal lab result, we will notify you by phone as soon as possible.  Submit refill requests through [employer] or call your pharmacy and they will forward the refill request to us. Please allow 3 business days for your refill to be completed.          Additional Information About Your Visit        [facility] Information     [employer] gives you secure access to your electronic health record. If you see a primary care provider, you can also send messages to your care team and make appointments. If you have questions, please call your primary care clinic.  If you do not have a primary care provider, please call [phone] and they will assist you.        Care EveryWhere ID     This is your Care EveryWhere ID. This could be used by other organizations to access your [facility] medical records  [id number]        Your Vitals Were     Pulse Temperature Height Pulse Oximetry Breastfeeding? BMI (Body Mass Index)    83 98.8  F (37.1  C) (Oral) 5' 7\" (1.702 m) 96% No 33.3 kg/m2       Blood Pressure from Last " 3 Encounters:   01/31/18 105/68   01/10/18 136/90   11/08/17 100/62    Weight from Last 3 Encounters:   01/31/18 212 lb 9.6 oz (96.4 kg)   11/08/17 207 lb (93.9 kg)   10/26/17 210 lb 4.8 oz (95.4 kg)              We Performed the Following     Basic metabolic panel  (Ca, Cl, CO2, Creat, Gluc, K, Na, BUN)     BNP-N terminal pro     Hemoglobin A1c          Today's Medication Changes          These changes are accurate as of 1/31/18  2:15 PM.  If you have any questions, ask your nurse or doctor.               Start taking these medicines.        Dose/Directions    azithromycin 250 MG tablet   Commonly known as:  ZITHROMAX   Used for:  Acute sinusitis with symptoms > 10 days   Started by:  Ilene Tristan MD        Two tablets first day, then one tablet daily for four days.   Quantity:  6 tablet   Refills:  0         These medicines have changed or have updated prescriptions.        Dose/Directions    acyclovir 5 % ointment   Commonly known as:  ZOVIRAX   This may have changed:  additional instructions   Used for:  Recurrent cold sores        Apply topically 6 times daily   Quantity:  15 g   Refills:  3       fluticasone 50 MCG/ACT spray   Commonly known as:  FLONASE   This may have changed:    - when to take this  - reasons to take this   Used for:  Seasonal allergic rhinitis        Dose:  1-2 spray   Spray 1-2 sprays into both nostrils daily   Quantity:  16 g   Refills:  5            Where to get your medicines      These [id number] [address] [address] [address]     Phone:  [phone]     azithromycin 250 MG tablet                Primary Care Provider Office Phone # Fax #    Ilene Tristan [phone] [address] [address]        Equal Access to Services     GENNY STONER AH: Gilda Trammell, rogelio perez, anderson esposito  luis jaegerstas menon'aan ah. So [facility] [phone].    ATENCIÓN: Si jewel covarrubias, tiene a conti disposición servicios gratuitos de asistencia lingüística. Noel caprenter [phone].    We comply with applicable federal civil rights laws and [place] laws. We do not discriminate on the basis of race, color, national origin, age, disability, sex, sexual orientation, or gender identity.            Thank you!     Thank you for choosing [facility]  for your care. Our goal is always to provide you with excellent care. Hearing back from our patients is one way we can continue to improve our services. Please take a few minutes to complete the written survey that you may receive in the mail after your visit with us. Thank you!             Your Updated Medication List - Protect others around you: Learn how to safely use, store and throw away your medicines at www.disposemymeds.org.          This list is accurate as of 1/31/18  2:15 PM.  Always use your most recent med list.                   Brand Name Dispense Instructions for use Diagnosis    acyclovir 400 MG tablet    ZOVIRAX     Take 400 mg by mouth See Admin Instructions 5 times daily as needed for outbreaks        acyclovir 5 % ointment    ZOVIRAX    15 g    Apply topically 6 times daily    Recurrent cold sores       albuterol 108 (90 BASE) MCG/ACT Inhaler    PROAIR HFA/PROVENTIL HFA/VENTOLIN HFA    1 Inhaler    Inhale 2 puffs into the lungs every 6 hours    ILD (interstitial lung disease) (H)       alendronate 70 MG tablet    FOSAMAX    4 tablet    Take 1 tablet (70 mg) by mouth every 7 days    Other osteoporosis without current pathological fracture       atorvastatin 20 MG tablet    LIPITOR    90 tablet    Take 1 tablet (20 mg) by mouth daily    Hyperlipidemia LDL goal <100       azithromycin 250 MG tablet    ZITHROMAX    6 tablet    Two tablets first day, then one tablet daily for four days.    Acute sinusitis with symptoms > 10 days       * blood  glucose monitoring lancets     4 Box    Use to test blood sugar 4 times daily or as directed.    Type 2 diabetes mellitus without complication, without long-term current use of insulin (H)       * blood glucose monitoring lancets     1 Box    Use to test blood sugar 4 times daily or as directed.  Ok to substitute alternative if insurance prefers.    Type 2 diabetes mellitus without complication, without long-term current use of insulin (H)       * blood glucose monitoring test strip    no brand specified    300 strip    Use to test blood sugars 4 times daily or as directed    Type 2 diabetes mellitus without complication, without long-term current use of insulin (H)       * blood glucose monitoring test strip    ACCU-CHEK SHANNON PLUS    120 strip    Use to test blood sugar 4 times daily or as directed.  Ok to substitute alternative if insurance prefers.    Type 2 diabetes mellitus without complication, without long-term current use of insulin (H)       COMPRESSION STOCKINGS     2 each    Wear compression stockings in affected leg (right leg) or both legs most of the time during the day and take them off at night.    DVT (deep venous thrombosis), right, Postphlebitic syndrome, Chronic anticoagulation, Atrial fibrillation (H)       escitalopram 10 MG tablet    LEXAPRO    90 tablet    TAKE 1 TABLET (10 MG) BY MOUTH DAILY    Major depressive disorder, recurrent episode, moderate (H)       fluticasone 220 MCG/ACT Inhaler    FLOVENT HFA    3 Inhaler    Inhale 2 puffs into the lungs 2 times daily    ILD (interstitial lung disease) (H), Follicular bronchiolitis (H)       fluticasone 50 MCG/ACT spray    FLONASE    16 g    Spray 1-2 sprays into both nostrils daily    Seasonal allergic rhinitis       furosemide 40 MG tablet    LASIX    180 tablet    Take 1 tablet (40 mg) by mouth 2 times daily    (HFpEF) heart failure with preserved ejection fraction (H)       * insulin pen needle 31G X 8 MM    B-D U/F    400 each    Use 4  times daily (with Lantus and Novolog) or as directed.    Type 2 diabetes mellitus without complication, without long-term current use of insulin (H)       * insulin pen needle 32G X 4 MM    BD JANE U/F    200 each    Use 4 daily as directed.    Type 2 diabetes mellitus without complication, without long-term current use of insulin (H)       ketoconazole 2 % cream    NIZORAL    60 g    Apply topically 2 times daily    Cutaneous candidiasis       LANTUS SOLOSTAR 100 UNIT/ML injection   Generic drug:  insulin glargine     15 mL    INJECT 25 UNTIS SUBCUTANEOUSLY EVERY DAY    Type 2 diabetes mellitus with hyperglycemia, with long-term current use of insulin (H)       lisinopril 2.5 MG tablet    PRINIVIL/Zestril    90 tablet    TAKE 1 TABLET (2.5 MG) BY MOUTH DAILY    Essential hypertension with goal blood pressure less than 140/90       metFORMIN 500 MG 24 hr tablet    GLUCOPHAGE-XR    180 tablet    TAKE 2 TABLETS BY MOUTH DAILY WITH DINNER    Type 2 diabetes mellitus without complication, without long-term current use of insulin (H)       metoprolol succinate 100 MG 24 hr tablet    TOPROL-XL    90 tablet    TAKE 1 TABLET (100 MG) BY MOUTH DAILY    Atrial fibrillation with controlled ventricular response (H)       montelukast 10 MG tablet    SINGULAIR    90 tablet    TAKE 1 TABLET BY MOUTH AT BEDTIME    Sjogren's syndrome with lung involvement (H), ILD (interstitial lung disease) (H), Chronic seasonal allergic rhinitis due to other allergen       NovoLOG FLEXPEN 100 UNIT/ML injection   Generic drug:  insulin aspart     15 mL    INJECT 12 UNITS SUBCUTANEOUS 3 TIMES DAILY (WITH MEALS)    Type 2 diabetes mellitus with other specified complication (H)       * order for DME     1 Device    Oxygen: Patient requires supplemental Oxygen 4 LPM via nasal canula with activity. Please provide patient with a home unit and with portability capability. Oxygen will be for a lifetime.    Hypoxia, ILD (interstitial lung disease) (H)        * order for DME     1 each    Equipment being ordered: Full face mask for CPAP - size: F10 large    ANGELICA (obstructive sleep apnea)       pantoprazole 20 MG EC tablet    PROTONIX    90 tablet    Take 1-2 tablets (20-40 mg) by mouth daily    Gastroesophageal reflux disease without esophagitis       PARoxetine 10 MG tablet    PAXIL    30 tablet    TAKE 1 TABLET (10MG) BY MOUTH AT BEDTIME    Major depressive disorder, recurrent episode, moderate (H)       polyethylene glycol Packet    MIRALAX/GLYCOLAX    7 packet    Take 17 g by mouth daily as needed for constipation    Constipation, unspecified constipation type       potassium chloride SA 20 MEQ CR tablet    K-DUR/KLOR-CON M    180 tablet    Take 2 tablets (40 mEq) by mouth daily    (HFpEF) heart failure with preserved ejection fraction (H)       predniSONE 10 MG tablet    DELTASONE    90 tablet    Take 2 tablets for three days (4/12-14), then take 1 tablet daily (10 mg daily starting 4/15)    ILD (interstitial lung disease) (H), Sjogren's syndrome (H)       spironolactone 25 MG tablet    ALDACTONE    180 tablet    Take 2 tablets (50 mg) by mouth daily    Chronic diastolic congestive heart failure (H)       traZODone 50 MG tablet    DESYREL    180 tablet    TAKE 1/2-1 TABLET BY MOUTH NIGHTLY AS NEEDED, CAN TITRATE DOWN TO 1/2TAB OR UP TO 2TABS AS NEEDED    Insomnia due to medical condition       TYLENOL 500 MG tablet   Generic drug:  acetaminophen      Take 500 mg by mouth nightly as needed    Dizziness       warfarin 7.5 MG tablet    COUMADIN    90 tablet    TAKE 1 TABLET BY MOUTH DAILY. CURRENT DOSE IS 7.5 MG DAILY. DOSE ADJUSTED PER INR RESULT.    Atrial fibrillation with controlled ventricular response (H)       * Notice:  This list has 8 medication(s) that are the same as other medications prescribed for you. Read the directions carefully, and ask your doctor or other care provider to review them with you.

## 2018-02-01 ENCOUNTER — TELEPHONE (OUTPATIENT)
Dept: FAMILY MEDICINE | Facility: CLINIC | Age: 78
End: 2018-02-01

## 2018-02-01 LAB
ANION GAP SERPL CALCULATED.3IONS-SCNC: 7 MMOL/L (ref 3–14)
BUN SERPL-MCNC: 27 MG/DL (ref 7–30)
CALCIUM SERPL-MCNC: 9.5 MG/DL (ref 8.5–10.1)
CHLORIDE SERPL-SCNC: 101 MMOL/L (ref 94–109)
CO2 SERPL-SCNC: 27 MMOL/L (ref 20–32)
CREAT SERPL-MCNC: 0.96 MG/DL (ref 0.52–1.04)
GFR SERPL CREATININE-BSD FRML MDRD: 56 ML/MIN/1.7M2
GLUCOSE SERPL-MCNC: 100 MG/DL (ref 70–99)
POTASSIUM SERPL-SCNC: 4.5 MMOL/L (ref 3.4–5.3)
SODIUM SERPL-SCNC: 135 MMOL/L (ref 133–144)

## 2018-02-01 RX ORDER — FUROSEMIDE 40 MG
40 TABLET ORAL 2 TIMES DAILY
Qty: 180 TABLET | Refills: 3 | COMMUNITY
Start: 2018-02-01 | End: 2018-02-16

## 2018-02-01 RX ORDER — POTASSIUM CHLORIDE 1500 MG/1
40 TABLET, EXTENDED RELEASE ORAL DAILY
Qty: 180 TABLET | Refills: 3 | COMMUNITY
Start: 2018-02-01 | End: 2018-02-16

## 2018-02-01 RX ORDER — INSULIN GLARGINE 100 [IU]/ML
INJECTION, SOLUTION SUBCUTANEOUS
Qty: 15 ML | Refills: 0 | Status: SHIPPED | OUTPATIENT
Start: 2018-02-01 | End: 2018-10-10

## 2018-02-01 NOTE — TELEPHONE ENCOUNTER
Reason for Call:  Other prescription    Detailed comments: Pt's pharmacy called and the pt;s insurance is no longer going to be covering the lantus. Please give [facility] pharmacy a call back in regards to what other substitute the pt can use. [facility] says the pt could do-- dasaglar instead. Thank you.    Phone Number Patient can be reached at: Other phone number:  [facility]: [phone]    Best Time:     Can we leave a detailed message on this number? YES    Call taken on 2/1/2018 at 9:27 AM by Erika Medina

## 2018-02-01 NOTE — TELEPHONE ENCOUNTER
Prescription approved per [facility] Refill Protocol.  Sophia Hemphill RN

## 2018-02-01 NOTE — TELEPHONE ENCOUNTER
CW,  Called pharmacy, they said pt's insurance prefers Basaglar   Lantus was refilled today - pharmacy states they have filled this for patient as it is currently covered  However, for future refills, insurance requesting Basaglar  Pharmacy pended  Please advise on Basaglar Rx if appropriate  Thanks,  Lydia HALEY RN

## 2018-02-01 NOTE — TELEPHONE ENCOUNTER
Patient is up 4 lbs but she doesn't think it is fluid build up.  She denies edema, bloating, or orthopnea.  Per Dr. Rosa will increase Furosemide to 80 mg BID and Potassium to 40 meq BID for 3 days. Touch base on Monday.  Betty verbalized understanding and is agreeable with the plan.

## 2018-02-05 NOTE — PROGRESS NOTES
Here are your lab results from your recent visit...  -Your basic metabolic panel (which includes electrolyte levels, blood sugar level and kidney function tests) looks okay other than the lower GFR (a kidney function test).  We should continue to keep a close eye on the GFR (especially with the lasix dose increasing (likely short term) per cardiology recommendations).  -Your hemoglobin A1C (the three month average of your glucose levels) looks very good at 7.0, down from 7.3 in October.  -The BNP lab also looks good.  It was lower than the last few checks, so it is less likely that your cough/shortness or breath has been brought on by heart failure issues.    Please let me know if you have any questions.  Best,   Lev Tristan MD

## 2018-02-05 NOTE — TELEPHONE ENCOUNTER
Called patient to follow up on response to increased diuretic. Her weight stayed the same, no changes in other symptoms. Her cough is productive, now sputum is yellow. She has resumed previous dose of Furosemide and Potassium on Sunday as instructed.  Will discuss with Dr. Rosa.

## 2018-02-06 ENCOUNTER — TELEPHONE (OUTPATIENT)
Dept: FAMILY MEDICINE | Facility: CLINIC | Age: 78
End: 2018-02-06

## 2018-02-06 NOTE — TELEPHONE ENCOUNTER
Discussed with Dr. Rosa, cough doesn't seem cardiac related and patient should follow up with PCP/Pulmonary.  Brianna verbalized understanding and is agreeable with plan.

## 2018-02-06 NOTE — TELEPHONE ENCOUNTER
Received form(s) from [employer] for Medication Therapy Management.  Placed form(s) in/on CW's desk.  Forms need to be filled out and signed and faxed to [phone]..    Call pt to verify form was sent: No  Copy needs to be sent for scanning after completion: Yes      Mary Warren CMA

## 2018-02-07 ENCOUNTER — RADIANT APPOINTMENT (OUTPATIENT)
Dept: ULTRASOUND IMAGING | Facility: CLINIC | Age: 78
End: 2018-02-07
Attending: OTOLARYNGOLOGY
Payer: MEDICARE

## 2018-02-07 DIAGNOSIS — R59.9 ENLARGED LYMPH NODES: Primary | ICD-10-CM

## 2018-02-07 LAB — COPATH REPORT: NORMAL

## 2018-02-07 PROCEDURE — 88185 FLOWCYTOMETRY/TC ADD-ON: CPT | Performed by: OTOLARYNGOLOGY

## 2018-02-07 PROCEDURE — 88173 CYTOPATH EVAL FNA REPORT: CPT | Performed by: OTOLARYNGOLOGY

## 2018-02-07 PROCEDURE — 88172 CYTP DX EVAL FNA 1ST EA SITE: CPT | Performed by: OTOLARYNGOLOGY

## 2018-02-07 PROCEDURE — 88184 FLOWCYTOMETRY/ TC 1 MARKER: CPT | Performed by: OTOLARYNGOLOGY

## 2018-02-07 PROCEDURE — 40001004 ZZHCL STATISTIC FLOW INT 9-15 ABY TC 88188: Performed by: OTOLARYNGOLOGY

## 2018-02-07 RX ORDER — LIDOCAINE HYDROCHLORIDE 10 MG/ML
5 INJECTION, SOLUTION INFILTRATION; PERINEURAL ONCE
Status: COMPLETED | OUTPATIENT
Start: 2018-02-07 | End: 2018-02-07

## 2018-02-07 RX ADMIN — LIDOCAINE HYDROCHLORIDE 3 ML: 10 INJECTION, SOLUTION INFILTRATION; PERINEURAL at 11:43

## 2018-02-07 NOTE — MR AVS SNAPSHOT
MRN:[id number]                      After Visit Summary   2/7/2018    Betty Tee    MRN: [id number]           Visit Information        Provider Department      2/7/2018 11:00 AM  PROCEDURAL RAD;  IMAGING NURSE; [facility]           Review of your medicines          These changes are accurate as of 2/7/18 11:33 AM.  If you have any questions, ask your nurse or doctor.               CONTINUE these medicines which may have CHANGED, or have new prescriptions. If we are uncertain of the size of tablets/capsules you have at home, strength may be listed as something that might have changed.        Dose / Directions    acyclovir 5 % ointment   Commonly known as:  ZOVIRAX   This may have changed:  additional instructions   Used for:  Recurrent cold sores        Apply topically 6 times daily   Quantity:  15 g   Refills:  3       fluticasone 50 MCG/ACT spray   Commonly known as:  FLONASE   This may have changed:    - when to take this  - reasons to take this   Used for:  Seasonal allergic rhinitis        Dose:  1-2 spray   Spray 1-2 sprays into both nostrils daily   Quantity:  16 g   Refills:  5         CONTINUE these medicines which have NOT CHANGED        Dose / Directions    acyclovir 400 MG tablet   Commonly known as:  ZOVIRAX        Dose:  400 mg   Take 400 mg by mouth See Admin Instructions 5 times daily as needed for outbreaks   Refills:  0       albuterol 108 (90 BASE) MCG/ACT Inhaler   Commonly known as:  PROAIR HFA/PROVENTIL HFA/VENTOLIN HFA   Used for:  ILD (interstitial lung disease) (H)        Dose:  2 puff   Inhale 2 puffs into the lungs every 6 hours   Quantity:  1 Inhaler   Refills:  3       alendronate 70 MG tablet   Commonly known as:  FOSAMAX   Used for:  Other osteoporosis without current pathological fracture        Dose:  70 mg   Take 1 tablet (70 mg) by mouth every 7 days   Quantity:  4 tablet   Refills:  11       atorvastatin 20 MG tablet   Commonly  known as:  LIPITOR   Used for:  Hyperlipidemia LDL goal <100        Dose:  20 mg   Take 1 tablet (20 mg) by mouth daily   Quantity:  90 tablet   Refills:  0       azithromycin 250 MG tablet   Commonly known as:  ZITHROMAX   Used for:  Acute sinusitis with symptoms > 10 days        Two tablets first day, then one tablet daily for four days.   Quantity:  6 tablet   Refills:  0       * blood glucose monitoring lancets   Used for:  Type 2 diabetes mellitus without complication, without long-term current use of insulin (H)        Use to test blood sugar 4 times daily or as directed.   Quantity:  4 Box   Refills:  3       * blood glucose monitoring lancets   Used for:  Type 2 diabetes mellitus without complication, without long-term current use of insulin (H)        Use to test blood sugar 4 times daily or as directed.  Ok to substitute alternative if insurance prefers.   Quantity:  1 Box   Refills:  11       * blood glucose monitoring test strip   Commonly known as:  no brand specified   Used for:  Type 2 diabetes mellitus without complication, without long-term current use of insulin (H)        Use to test blood sugars 4 times daily or as directed   Quantity:  300 strip   Refills:  3       * blood glucose monitoring test strip   Commonly known as:  ACCU-CHEK SHANNON PLUS   Used for:  Type 2 diabetes mellitus without complication, without long-term current use of insulin (H)        Use to test blood sugar 4 times daily or as directed.  Ok to substitute alternative if insurance prefers.   Quantity:  120 strip   Refills:  11       COMPRESSION STOCKINGS   Used for:  DVT (deep venous thrombosis), right, Postphlebitic syndrome, Chronic anticoagulation, Atrial fibrillation (H)        Wear compression stockings in affected leg (right leg) or both legs most of the time during the day and take them off at night.   Quantity:  2 each   Refills:  2       escitalopram 10 MG tablet   Commonly known as:  LEXAPRO   Used for:  Major  depressive disorder, recurrent episode, moderate (H)        TAKE 1 TABLET (10 MG) BY MOUTH DAILY   Quantity:  90 tablet   Refills:  0       fluticasone 220 MCG/ACT Inhaler   Commonly known as:  FLOVENT HFA   Used for:  ILD (interstitial lung disease) (H), Follicular bronchiolitis (H)        Dose:  2 puff   Inhale 2 puffs into the lungs 2 times daily   Quantity:  3 Inhaler   Refills:  3       furosemide 40 MG tablet   Commonly known as:  LASIX   Used for:  (HFpEF) heart failure with preserved ejection fraction (H)        Dose:  40 mg   Take 40 mg by mouth 2 times daily   Quantity:  180 tablet   Refills:  3       * insulin pen needle 31G X 8 MM   Commonly known as:  B-D U/F   Used for:  Type 2 diabetes mellitus without complication, without long-term current use of insulin (H)        Use 4 times daily (with Lantus and Novolog) or as directed.   Quantity:  400 each   Refills:  3       * insulin pen needle 32G X 4 MM   Commonly known as:  BD JANE U/F   Used for:  Type 2 diabetes mellitus without complication, without long-term current use of insulin (H)        Use 4 daily as directed.   Quantity:  200 each   Refills:  11       ketoconazole 2 % cream   Commonly known as:  NIZORAL   Used for:  Cutaneous candidiasis        Apply topically 2 times daily   Quantity:  60 g   Refills:  1       LANTUS SOLOSTAR 100 UNIT/ML injection   Used for:  Type 2 diabetes mellitus with hyperglycemia, with long-term current use of insulin (H)   Generic drug:  insulin glargine        INJECT 25 UNTIS SUBCUTANEOUSLY EVERY DAY   Quantity:  15 mL   Refills:  0       lisinopril 2.5 MG tablet   Commonly known as:  PRINIVIL/Zestril   Used for:  Essential hypertension with goal blood pressure less than 140/90        TAKE 1 TABLET (2.5 MG) BY MOUTH DAILY   Quantity:  90 tablet   Refills:  0       metFORMIN 500 MG 24 hr tablet   Commonly known as:  GLUCOPHAGE-XR   Used for:  Type 2 diabetes mellitus without complication, without long-term current use  of insulin (H)        TAKE 2 TABLETS BY MOUTH DAILY WITH DINNER   Quantity:  180 tablet   Refills:  0       metoprolol succinate 100 MG 24 hr tablet   Commonly known as:  TOPROL-XL   Used for:  Atrial fibrillation with controlled ventricular response (H)        TAKE 1 TABLET (100 MG) BY MOUTH DAILY   Quantity:  90 tablet   Refills:  3       montelukast 10 MG tablet   Commonly known as:  SINGULAIR   Used for:  Sjogren's syndrome with lung involvement (H), ILD (interstitial lung disease) (H), Chronic seasonal allergic rhinitis due to other allergen        TAKE 1 TABLET BY MOUTH AT BEDTIME   Quantity:  90 tablet   Refills:  1       NovoLOG FLEXPEN 100 UNIT/ML injection   Used for:  Type 2 diabetes mellitus with other specified complication (H)   Generic drug:  insulin aspart        INJECT 12 UNITS SUBCUTANEOUS 3 TIMES DAILY (WITH MEALS)   Quantity:  15 mL   Refills:  0       * order for DME   Used for:  Hypoxia, ILD (interstitial lung disease) (H)        Oxygen: Patient requires supplemental Oxygen 4 LPM via nasal canula with activity. Please provide patient with a home unit and with portability capability. Oxygen will be for a lifetime.   Quantity:  1 Device   Refills:  0       * order for DME   Used for:  ANGELICA (obstructive sleep apnea)        Equipment being ordered: Full face mask for CPAP - size: F10 large   Quantity:  1 each   Refills:  0       pantoprazole 20 MG EC tablet   Commonly known as:  PROTONIX   Used for:  Gastroesophageal reflux disease without esophagitis        Dose:  20-40 mg   Take 1-2 tablets (20-40 mg) by mouth daily   Quantity:  90 tablet   Refills:  0       polyethylene glycol Packet   Commonly known as:  MIRALAX/GLYCOLAX   Used for:  Constipation, unspecified constipation type        Dose:  17 g   Take 17 g by mouth daily as needed for constipation   Quantity:  7 packet   Refills:  0       potassium chloride SA 20 MEQ CR tablet   Commonly known as:  K-DUR/KLOR-CON M   Used for:  (HFpEF) heart  failure with preserved ejection fraction (H)        Dose:  40 mEq   Take 40 mEq by mouth daily   Quantity:  180 tablet   Refills:  3       predniSONE 10 MG tablet   Commonly known as:  DELTASONE   Used for:  ILD (interstitial lung disease) (H), Sjogren's syndrome (H)        Take 2 tablets for three days (4/12-14), then take 1 tablet daily (10 mg daily starting 4/15)   Quantity:  90 tablet   Refills:  3       spironolactone 25 MG tablet   Commonly known as:  ALDACTONE   Used for:  Chronic diastolic congestive heart failure (H)        Dose:  50 mg   Take 2 tablets (50 mg) by mouth daily   Quantity:  180 tablet   Refills:  3       traZODone 50 MG tablet   Commonly known as:  DESYREL   Used for:  Insomnia due to medical condition        TAKE 1/2-1 TABLET BY MOUTH NIGHTLY AS NEEDED, CAN TITRATE DOWN TO 1/2TAB OR UP TO 2TABS AS NEEDED   Quantity:  180 tablet   Refills:  0       TYLENOL 500 MG tablet   Used for:  Dizziness   Generic drug:  acetaminophen        Dose:  500 mg   Take 500 mg by mouth nightly as needed   Refills:  0       warfarin 7.5 MG tablet   Commonly known as:  COUMADIN   Used for:  Atrial fibrillation with controlled ventricular response (H)        TAKE 1 TABLET BY MOUTH DAILY. CURRENT DOSE IS 7.5 MG DAILY. DOSE ADJUSTED PER INR RESULT.   Quantity:  90 tablet   Refills:  0       * Notice:  This list has 8 medication(s) that are the same as other medications prescribed for you. Read the directions carefully, and ask your doctor or other care provider to review them with you.             Protect others around you: Learn how to safely use, store and throw away your medicines at www.disposemymeds.org.         Follow-ups after your visit        Your next 10 appointments already scheduled     Feb 21, 2018  1:00 PM CST   (Arrive by 12:45 PM)   NEW ARRHYTHMIA with Anderson Perez MD   [facility] ([facility])    [address] [place] [address]  [address] [place] [id number]    [phone]            Mar 08, 2018  3:00 PM CST   (Arrive by 2:45 PM)   Return Visit with Carmenza Stringer MD   [facility] ([facility])    [address]  [address]  [facility] [address]   [phone]            Mar 28, 2018  1:00 PM CDT   Office Visit with Ilene Tristan MD   [facility] ([facility])    [address] [id number] [phone]           Bring a current list of meds and any records pertaining to this visit. For Physicals, please bring immunization records and any forms needing to be filled out. Please arrive 10 minutes early to complete paperwork.            Apr 26, 2018  3:00 PM CDT   Lab with  LAB   [facility] ([facility])    [address]  1st Floor  [facility] [id number] [phone]            Apr 26, 2018  3:30 PM CDT   (Arrive by 3:15 PM)   RETURN HEART FAILURE with Radha Rosa MD   [facility] ([facility])    [address]  [address]  [facility] [address]   [phone]            Jul 18, 2018 12:30 PM CDT   FULL PULMONARY FUNCTION with  PFL C   [facility] ([facility])    [address]  3rd Floor  [facility] [id number] [phone]            Jul 18, 2018  1:30 PM CDT   (Arrive by 1:15 PM)   Return Interstitial Lung with Meño Walsh MD   [facility] ([facility])    [address]  [address]  [facility] [id number] [phone]              Future tests that were ordered for you     Fine needle aspiration       Fine needle aspiration procedure: Ultrasound  Head/Neck Site 1:  RT Level 2 #1                   Care Instructions        Further instructions from your care team       Directions for after your Thyroid Fine Needle Aspiration:    You can  remove your bandage within a few hours.  The site of the biopsy may be sore for a day or two after the procedure. You can take over-the-counter pain medicine if needed.  Notify your doctor if you have any of the following:    Fever above 101 degrees    Swelling in the area of the biopsy    Redness or leaking from the biopsy site  Your doctor will notify you of the results in 2-3 days.     Additional Information About Your Visit        [employer] Information     [email or web address] gives you secure access to your electronic health record. If you see a primary care provider, you can also send messages to your care team and make appointments. If you have questions, please call your primary care clinic.  If you do not have a primary care provider, please call [phone] and they will assist you.      [email or web address] is an electronic gateway that provides easy, online access to your medical records. With [email or web address], you can request a clinic appointment, read your test results, renew a prescription or communicate with your care team.     To access your existing account, please contact your [facility] or call [phone] for assistance.        Care EveryWhere ID     This is your Care EveryWhere ID. This could be used by other organizations to access your [facility] medical records  [id number]         Primary Care Provider Office Phone # Fax #    Ilene Tristan [phone]      Equal Access to Services     [facility] : Hadii aad ku hadasho Sojefry, waaxda luqadaha, qaybta kaalmada adeegyalee, anderson mercedes . So [facility] [phone].    ATENCIÓN: Si habla español, tiene a conti disposición servicios gratuitos de asistencia lingüística. Llame al [phone].    We comply with applicable federal civil rights laws and [place] laws. We do not discriminate on the basis of race, color, national origin, age, disability, sex, sexual orientation, or gender identity.             Thank you!     Thank you for choosing [facility] for your care. Our goal is always to provide you with excellent care. Hearing back from our patients is one way we can continue to improve our services. Please take a few minutes to complete the written survey that you may receive in the mail after you visit with us. Thank you!             Medication List: This is a list of all your medications and when to take them. Check marks below indicate your daily home schedule. Keep this list as a reference.          This list is accurate as of 2/7/18 11:33 AM.  Always use your most recent med list.               Medications           Morning Afternoon Evening Bedtime As Needed    acyclovir 400 MG tablet   Commonly known as:  ZOVIRAX   Take 400 mg by mouth See Admin Instructions 5 times daily as needed for outbreaks                                acyclovir 5 % ointment   Commonly known as:  ZOVIRAX   Apply topically 6 times daily                                albuterol 108 (90 BASE) MCG/ACT Inhaler   Commonly known as:  PROAIR HFA/PROVENTIL HFA/VENTOLIN HFA   Inhale 2 puffs into the lungs every 6 hours                                alendronate 70 MG tablet   Commonly known as:  FOSAMAX   Take 1 tablet (70 mg) by mouth every 7 days                                atorvastatin 20 MG tablet   Commonly known as:  LIPITOR   Take 1 tablet (20 mg) by mouth daily                                azithromycin 250 MG tablet   Commonly known as:  ZITHROMAX   Two tablets first day, then one tablet daily for four days.                                * blood glucose monitoring lancets   Use to test blood sugar 4 times daily or as directed.                                * blood glucose monitoring lancets   Use to test blood sugar 4 times daily or as directed.  Ok to substitute alternative if insurance prefers.                                * blood glucose monitoring test strip   Commonly known as:  no brand specified   Use to test blood  sugars 4 times daily or as directed                                * blood glucose monitoring test strip   Commonly known as:  ACCU-CHEK SHANNON PLUS   Use to test blood sugar 4 times daily or as directed.  Ok to substitute alternative if insurance prefers.                                COMPRESSION STOCKINGS   Wear compression stockings in affected leg (right leg) or both legs most of the time during the day and take them off at night.                                escitalopram 10 MG tablet   Commonly known as:  LEXAPRO   TAKE 1 TABLET (10 MG) BY MOUTH DAILY                                fluticasone 220 MCG/ACT Inhaler   Commonly known as:  FLOVENT HFA   Inhale 2 puffs into the lungs 2 times daily                                fluticasone 50 MCG/ACT spray   Commonly known as:  FLONASE   Spray 1-2 sprays into both nostrils daily                                furosemide 40 MG tablet   Commonly known as:  LASIX   Take 40 mg by mouth 2 times daily                                * insulin pen needle 31G X 8 MM   Commonly known as:  B-D U/F   Use 4 times daily (with Lantus and Novolog) or as directed.                                * insulin pen needle 32G X 4 MM   Commonly known as:  BD JANE U/F   Use 4 daily as directed.                                ketoconazole 2 % cream   Commonly known as:  NIZORAL   Apply topically 2 times daily                                LANTUS SOLOSTAR 100 UNIT/ML injection   INJECT 25 UNTIS SUBCUTANEOUSLY EVERY DAY   Generic drug:  insulin glargine                                lisinopril 2.5 MG tablet   Commonly known as:  PRINIVIL/Zestril   TAKE 1 TABLET (2.5 MG) BY MOUTH DAILY                                metFORMIN 500 MG 24 hr tablet   Commonly known as:  GLUCOPHAGE-XR   TAKE 2 TABLETS BY MOUTH DAILY WITH DINNER                                metoprolol succinate 100 MG 24 hr tablet   Commonly known as:  TOPROL-XL   TAKE 1 TABLET (100 MG) BY MOUTH DAILY                                 montelukast 10 MG tablet   Commonly known as:  SINGULAIR   TAKE 1 TABLET BY MOUTH AT BEDTIME                                NovoLOG FLEXPEN 100 UNIT/ML injection   INJECT 12 UNITS SUBCUTANEOUS 3 TIMES DAILY (WITH MEALS)   Generic drug:  insulin aspart                                * order for DME   Oxygen: Patient requires supplemental Oxygen 4 LPM via nasal canula with activity. Please provide patient with a home unit and with portability capability. Oxygen will be for a lifetime.                                * order for DME   Equipment being ordered: Full face mask for CPAP - size: F10 large                                pantoprazole 20 MG EC tablet   Commonly known as:  PROTONIX   Take 1-2 tablets (20-40 mg) by mouth daily                                polyethylene glycol Packet   Commonly known as:  MIRALAX/GLYCOLAX   Take 17 g by mouth daily as needed for constipation                                potassium chloride SA 20 MEQ CR tablet   Commonly known as:  K-DUR/KLOR-CON M   Take 40 mEq by mouth daily                                predniSONE 10 MG tablet   Commonly known as:  DELTASONE   Take 2 tablets for three days (4/12-14), then take 1 tablet daily (10 mg daily starting 4/15)                                spironolactone 25 MG tablet   Commonly known as:  ALDACTONE   Take 2 tablets (50 mg) by mouth daily                                traZODone 50 MG tablet   Commonly known as:  DESYREL   TAKE 1/2-1 TABLET BY MOUTH NIGHTLY AS NEEDED, CAN TITRATE DOWN TO 1/2TAB OR UP TO 2TABS AS NEEDED                                TYLENOL 500 MG tablet   Take 500 mg by mouth nightly as needed   Generic drug:  acetaminophen                                warfarin 7.5 MG tablet   Commonly known as:  COUMADIN   TAKE 1 TABLET BY MOUTH DAILY. CURRENT DOSE IS 7.5 MG DAILY. DOSE ADJUSTED PER INR RESULT.                                * Notice:  This list has 8 medication(s) that are the same as other medications  prescribed for you. Read the directions carefully, and ask your doctor or other care provider to review them with you.

## 2018-02-08 LAB — COPATH REPORT: NORMAL

## 2018-02-09 DIAGNOSIS — E11.65 TYPE 2 DIABETES MELLITUS WITH HYPERGLYCEMIA, WITH LONG-TERM CURRENT USE OF INSULIN (H): ICD-10-CM

## 2018-02-09 DIAGNOSIS — Z79.4 TYPE 2 DIABETES MELLITUS WITH HYPERGLYCEMIA, WITH LONG-TERM CURRENT USE OF INSULIN (H): ICD-10-CM

## 2018-02-11 ENCOUNTER — PRE VISIT (OUTPATIENT)
Dept: CARDIOLOGY | Facility: CLINIC | Age: 78
End: 2018-02-11

## 2018-02-11 DIAGNOSIS — I50.30 (HFPEF) HEART FAILURE WITH PRESERVED EJECTION FRACTION (H): Primary | ICD-10-CM

## 2018-02-12 ENCOUNTER — TELEPHONE (OUTPATIENT)
Dept: OTOLARYNGOLOGY | Facility: CLINIC | Age: 78
End: 2018-02-12

## 2018-02-12 RX ORDER — INSULIN GLARGINE 100 [IU]/ML
INJECTION, SOLUTION SUBCUTANEOUS
Qty: 15 ML | Refills: 0 | Status: SHIPPED | OUTPATIENT
Start: 2018-02-12 | End: 2018-02-21

## 2018-02-12 NOTE — TELEPHONE ENCOUNTER
"The patient was called with the results of the lymph node biopsy from the other day ordered by Dr Polk.  It shows; \"Lymph node, right level 2, ultrasound-guided fine needle aspiration:   - Negative for carcinoma   - See comment   Sister Randal will follow up on a yearly basis.  Rakesh Pearson ,RN  [phone]      "

## 2018-02-12 NOTE — TELEPHONE ENCOUNTER
"Pres[place] per [facility] [place].  Lydia HALEY, RN    Requested Prescriptions   Pending Prescriptions Disp Refills     LANTUS SOLOSTAR 100 UNIT/ML soln [Pharmacy Med Name: LANTUS SOLOSTAR 100 UNIT/ML]  0     Sig: INJECT 25 UNTIS SUBCUTANEOUSLY EVERY DAY    Long Acting Insulin Protocol Passed    2/9/2018  2:31 PM       Passed - Blood pressure less than 140/90 in past 6 months    BP Readings from Last 3 Encounters:   01/31/18 105/68   01/10/18 136/90   11/08/17 100/62                Passed - LDL on file in past 12 months    Recent Labs   Lab Test  06/22/17   1700   LDL  13            Passed - Microalbumin on file in past 12 months    Recent Labs   Lab Test  10/06/17   1422   MICROL  14   UMALCR  29.65*            Passed - Serum creatinine on file in past 12 months    Recent Labs   Lab Test  01/31/18   1428   CR  0.96            Passed - HgbA1C in past 3 or 6 months    Recent Labs   Lab Test  01/31/18   1428   A1C  7.0*            Passed - Patient is age 18 or older       Passed - Recent visit with authorizing provider's specialty in past 6 months    Patient had office visit in the last 6 months or has a visit in the next 30 days with authorizing provider.  See \"Patient Info\" tab in inbasket, or \"Choose Columns\" in Meds & Orders section of the refill encounter.            Next 5 appointments (look out 90 days)     Mar 28, 2018  1:00 PM CDT   Office Visit with Ilene Tristan MD   [facility] ([facility])    [address] [facility] [address] [phone]                  "

## 2018-02-13 DIAGNOSIS — F33.1 MAJOR DEPRESSIVE DISORDER, RECURRENT EPISODE, MODERATE (H): ICD-10-CM

## 2018-02-14 RX ORDER — ESCITALOPRAM OXALATE 10 MG/1
TABLET ORAL
Qty: 90 TABLET | Refills: 0 | Status: SHIPPED | OUTPATIENT
Start: 2018-02-14 | End: 2018-05-09

## 2018-02-14 NOTE — TELEPHONE ENCOUNTER
"Pres[place] per [facility] [place].  Lydia HALEY RN    Requested Prescriptions   Pending Prescriptions Disp Refills     escitalopram (LEXAPRO) 10 MG tablet [Pharmacy Med Name: ESCITALOPRAM 10 MG TABLET] 90 tablet 0     Sig: TAKE 1 TABLET (10 MG) BY MOUTH DAILY    SSRIs Protocol Passed    2/13/2018  9:01 PM       Passed - PHQ-9 score less than 5 in past 6 months    The PHQ-9 criteria is meant to fail. It requires a PHQ-9 score review         Passed - Patient is age 18 or older       Passed - No active pregnancy on record       Passed - No positive pregnancy test in last 12 months       Passed - Recent (6 mo) or future visit with authorizing provider's specialty    Patient had office visit in the last 6 months or has a visit in the next 30 days with authorizing provider.  See \"Patient Info\" tab in inbasket, or \"Choose Columns\" in Meds & Orders section of the refill encounter.            Next 5 appointments (look out 90 days)     Mar 28, 2018  1:00 PM CDT   Office Visit with Ilene Tristan MD   [facility] ([facility])    [address] [facility] [address] [id number] [phone]                  "

## 2018-02-16 ENCOUNTER — PRE VISIT (OUTPATIENT)
Dept: CARDIOLOGY | Facility: CLINIC | Age: 78
End: 2018-02-16

## 2018-02-16 ENCOUNTER — OFFICE VISIT (OUTPATIENT)
Dept: CARDIOLOGY | Facility: CLINIC | Age: 78
End: 2018-02-16
Attending: NURSE PRACTITIONER
Payer: MEDICARE

## 2018-02-16 VITALS
SYSTOLIC BLOOD PRESSURE: 104 MMHG | DIASTOLIC BLOOD PRESSURE: 65 MMHG | HEART RATE: 82 BPM | HEIGHT: 67 IN | OXYGEN SATURATION: 97 % | WEIGHT: 213.9 LBS | BODY MASS INDEX: 33.57 KG/M2

## 2018-02-16 DIAGNOSIS — I48.19 PERSISTENT ATRIAL FIBRILLATION (H): Primary | ICD-10-CM

## 2018-02-16 DIAGNOSIS — I50.30 (HFPEF) HEART FAILURE WITH PRESERVED EJECTION FRACTION (H): ICD-10-CM

## 2018-02-16 DIAGNOSIS — I48.91 ATRIAL FIBRILLATION WITH CONTROLLED VENTRICULAR RESPONSE (H): ICD-10-CM

## 2018-02-16 LAB
ANION GAP SERPL CALCULATED.3IONS-SCNC: 7 MMOL/L (ref 3–14)
BUN SERPL-MCNC: 19 MG/DL (ref 7–30)
CALCIUM SERPL-MCNC: 9 MG/DL (ref 8.5–10.1)
CHLORIDE SERPL-SCNC: 102 MMOL/L (ref 94–109)
CO2 SERPL-SCNC: 29 MMOL/L (ref 20–32)
CREAT SERPL-MCNC: 0.85 MG/DL (ref 0.52–1.04)
GFR SERPL CREATININE-BSD FRML MDRD: 64 ML/MIN/1.7M2
GLUCOSE SERPL-MCNC: 116 MG/DL (ref 70–99)
POTASSIUM SERPL-SCNC: 3.7 MMOL/L (ref 3.4–5.3)
SODIUM SERPL-SCNC: 138 MMOL/L (ref 133–144)

## 2018-02-16 PROCEDURE — 80048 BASIC METABOLIC PNL TOTAL CA: CPT | Performed by: NURSE PRACTITIONER

## 2018-02-16 PROCEDURE — G0463 HOSPITAL OUTPT CLINIC VISIT: HCPCS | Mod: ZF

## 2018-02-16 PROCEDURE — 99214 OFFICE O/P EST MOD 30 MIN: CPT | Mod: ZP | Performed by: NURSE PRACTITIONER

## 2018-02-16 PROCEDURE — 36415 COLL VENOUS BLD VENIPUNCTURE: CPT | Performed by: NURSE PRACTITIONER

## 2018-02-16 RX ORDER — FUROSEMIDE 40 MG
TABLET ORAL
Qty: 180 TABLET | Refills: 3 | COMMUNITY
Start: 2018-02-16 | End: 2018-03-07

## 2018-02-16 RX ORDER — METOPROLOL SUCCINATE 100 MG/1
TABLET, EXTENDED RELEASE ORAL
Qty: 90 TABLET | Refills: 3 | COMMUNITY
Start: 2018-02-16 | End: 2018-02-21

## 2018-02-16 RX ORDER — METOPROLOL SUCCINATE 25 MG/1
TABLET, EXTENDED RELEASE ORAL
Qty: 15 TABLET | Refills: 0 | Status: SHIPPED | OUTPATIENT
Start: 2018-02-16 | End: 2018-02-21

## 2018-02-16 RX ORDER — POTASSIUM CHLORIDE 1500 MG/1
TABLET, EXTENDED RELEASE ORAL
Qty: 180 TABLET | Refills: 3 | COMMUNITY
Start: 2018-02-16 | End: 2018-09-17

## 2018-02-16 ASSESSMENT — PAIN SCALES - GENERAL: PAINLEVEL: NO PAIN (0)

## 2018-02-16 NOTE — NURSING NOTE
Chief Complaint   Patient presents with     Follow Up For     Return CORE; 77 year old with diastolic heart failure  presenting for follow up with labs prior     Vitals were taken and medications were reconciled.     CHARLES Canchola  11:42 AM

## 2018-02-16 NOTE — PATIENT INSTRUCTIONS
"You were seen today in the Cardiovascular Clinic at the [facility].     Cardiology Providers you saw during your visit: Kiesha Elias NP     1. Increase Lasix to 60 mg in the morning, 40 mg in the afternoon.   2. Increase Potassium to 40 mg in the morning, 20 mg in the afternoon.   3. Increase Metoprolol (Toprol XL) to 112.5 mg daily ( 100 mg tab plus half of one 25 mg tab)  4. Schedule a lab prior to visit with Dr Perez next week  5. Follow up with [facility] in 1 month      Results for JEAN KENNEDY (MRN [id number]) as of 2018 11:57   Ref. Range 2018 11:08   Sodium Latest Ref Range: 133 - 144 mmol/L 138   Potassium Latest Ref Range: 3.4 - 5.3 mmol/L 3.7   Chloride Latest Ref Range: 94 - 109 mmol/L 102   Carbon Dioxide Latest Ref Range: 20 - 32 mmol/L 29   Urea Nitrogen Latest Ref Range: 7 - 30 mg/dL 19   Creatinine Latest Ref Range: 0.52 - 1.04 mg/dL 0.85   GFR Estimate Latest Ref Range: >60 mL/min/1.7m2 64   GFR Estimate If Black Latest Ref Range: >60 mL/min/1.7m2 78   Calcium Latest Ref Range: 8.5 - 10.1 mg/dL 9.0   Anion Gap Latest Ref Range: 3 - 14 mmol/L 7   Glucose Latest Ref Range: 70 - 99 mg/dL 116 (H)     Please limit your fluid intake to 2 L (64 ounces) daily.  2 Liters a day = 8.5 cups, or 72 ounces.  Please limit your salt intake to 2 grams a day or less.     If you gain 2# in 24 hours or 5# in one week call Beatriz Blanco RN so we can adjust your medications as needed over the phone.     Please feel free to call me with any questions or concerns.       Beatriz Blanco RN  [facility]  Cardiology Care Coordinator-Heart Failure Clinic     Questions and [phone].   First press #1 for the University and then press #3 for \"Medical Questions\" to reach us Cardiology Nurses.      On Call Cardiologist for after hours or on weekends: [phone]   option #4 and ask to speak to the on-call Cardiologist. Inform them you are a CORE/heart " failure patient at the [place].           If you need a medication refill please contact your pharmacy.  Please allow 3 business days for your refill to be completed.  _______________________________________________________  [facility] Cardiomyopathy, Optimization, Rehabilitation, Education   The [facility]IN[facility] is a heart failure specialty clinic within the [facility] where you will work with specialized nurse practitioners dedicated to helping patients with heart failure carefully adjust medications, receive education, and learn who and when to call if symptoms develop. They specialize in helping you better understand your condition, slow the progression of your disease, improve the length and quality of your life, help you detect future heart problems before they become life threatening, and avoid hospitalizations.  As always, thank you for trusting us with your health care needs!

## 2018-02-16 NOTE — NURSING NOTE
Diet: Patient instructed regarding a heart healthy diet, including discussion of reduced fat and sodium intake. Patient demonstrated understanding of this information and agreed to call with further questions or concerns.  Labs: Patient was given results of the laboratory testing obtained today. Patient was instructed to return for the next laboratory testing in 1 week . Patient demonstrated understanding of this information and agreed to call with further questions or concerns.   Return Appointment: Patient given instructions regarding scheduling next clinic visit. Patient demonstrated understanding of this information and agreed to call with further questions or concerns.  Medication Change: Patient was educated regarding prescribed medication change, including discussion of the indication, administration, side effects, and when to report to MD or RN. Patient demonstrated understanding of this information and agreed to call with further questions or concerns.  Patient stated she understood all health information given and agreed to call with further questions or concerns.

## 2018-02-16 NOTE — LETTER
2/16/2018      RE: Betty Tee  [address]  [facility] [address]       Dear Colleague,    Thank you for the opportunity to participate in the care of your patient, Betty Tee, at the [facility]. Please see a copy of my visit note below.    HPI:   Ms. 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   She is using her oxygen for long distances at [employer]. She presents to [facility] for routine follow up.     She complains of KIMBLE, which has progressed over the last 1-2 days. Weight is up 2-3 lbs at home. She complains of 2-3 pillow orthopnea. She complains of persistent lightheadedness, exacerbated by position changes that clears in seconds. She denies fever, chills, chest pain, palpitations, SOB, KIMBLE, PND, orthopnea, nausea, vomiting, diarrhea, hematochezia, melena, or LE edema. His weight remains stable at 209-211 lbs. Odalyse continues to follow a low sodium diet, but cheats with salted nuts.       PAST MEDICAL HISTORY:  Past Medical History:   Diagnosis Date     Alcohol abuse, in remission      Allergic rhinitis, cause unspecified     allegra helps when she takes it     Antiplatelet or antithrombotic long-term use      Atrial fibrillation (H)     in hosp in 11/11 after surgery w/ fluid overload     Cardiomegaly     LVH on stress echo- cardiac w/u at [facility]- neg CT scan for PE, neg stress echo in 8/06     Chest pain, unspecified      Disorder of bone and cartilage, unspecified     osteopenia (had been on prempro), improved on 6/06 dexa, stable dexa 11/10     Diverticulosis of colon (without mention of hemorrhage)     last episode yrs ago     Essential hypertension, benign       Gastro-oesophageal reflux disease      Insomnia, unspecified     weaned off clonazepam     Irregular heart beat      Lumbago 7/09    MRI with [facility], now seeing Dr. Cain for sciatic sx's     Major depressive disorder, recurrent episode, moderate (H)      Obstructive sleep apnea      Osteoarthrosis, unspecified whether generalized or localized, unspecified site      Sjogren's syndrome (H)      Sleep apnea      Tobacco use disorder     chantix in 9/07, started again in 6/08, working       FAMILY HISTORY:  Family History   Problem Relation Age of Onset     C.A.D. Mother 63     MI- first at age 63     HEART DISEASE Mother      Hypertension Mother      CEREBROVASCULAR DISEASE Mother      Hyperlipidemia Mother      Alcohol/Drug Father      Alzheimer Disease Father      Dementia Father      Hypertension Father      Hyperlipidemia Father      C.A.D. Sister 52     Minor MI- age 50's     HEART DISEASE Sister      Hypertension Sister      Hypertension Sister      Hypertension Brother      Cancer - colorectal Sister 48     Late 40's early 50's     Prostate Cancer Brother 74     Dx'd age 74     GASTROINTESTINAL DISEASE Sister      Diverticulitis     GASTROINTESTINAL DISEASE Brother      Diverticulitis     Lipids Sister      Lipids Sister      Parkinsonism Brother      DIABETES Sister      HEART DISEASE Sister      CHF     CANCER Sister      lung, smoker     Substance Abuse Sister      Substance Abuse Brother      Asthma Sister      CANCER Sister      Breast Cancer Daughter      Prostate Cancer Brother      Hyperlipidemia Brother        SOCIAL HISTORY:  Social History     Social History     Marital status: Single     Spouse name: N/A     Number of children: 0     Years of education: Ed Spec De     Occupational History     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     Social History Main Topics     Smoking status: Former Smoker     Packs/day: 0.50     Years: 10.00     Types: Cigarettes      Quit date: 8/1/2011     Smokeless tobacco: Never Used      Comment: 1/2 ppd     Alcohol use No      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     Drug use: No     Sexual activity: No     Other Topics Concern     Not on file     Social History Narrative    Social Documentation:        Balanced Diet: YES    Calcium intake: 1-2 per day    Caffeine: 4-5 cups per day    Exercise:  type of activity limited right now due to foot injury    Sunscreen: No    Seatbelts:  Yes    Self Breast Exam:  Yes    Self Testicular Exam: n/a    Physical/Emotional/Sexual Abuse: No    Do you feel safe in your environment? No-pt lives in [facility]        Cholesterol screen up to date: No-will check today    Eye Exam up to date: Yes    Dental Exam up to date: Yes    Pap smear up to date: Does Not Apply    Mammogram up to date: Yes-10/07    Dexa Scan up to date: Yes-2006    Colonoscopy up to date: Yes-2006    Immunizations up to date: Yes-td 2002    Glucose screen if over 40:  Yes    '09                       CURRENT MEDICATIONS:  Outpatient Medications Prior to Visit   Medication Sig Dispense Refill     escitalopram (LEXAPRO) 10 MG tablet TAKE 1 TABLET (10 MG) BY MOUTH DAILY 90 tablet 0     LANTUS SOLOSTAR 100 UNIT/ML soln INJECT 25 UNTIS SUBCUTANEOUSLY EVERY DAY 15 mL 0     LANTUS SOLOSTAR 100 UNIT/ML soln INJECT 25 UNTIS SUBCUTANEOUSLY EVERY DAY 15 mL 0     NOVOLOG FLEXPEN 100 UNIT/ML soln INJECT 12 UNITS SUBCUTANEOUS 3 TIMES DAILY (WITH MEALS) 15 mL 0     montelukast (SINGULAIR) 10 MG tablet TAKE 1 TABLET BY MOUTH AT BEDTIME 90 tablet 1     lisinopril (PRINIVIL/ZESTRIL) 2.5 MG tablet TAKE 1 TABLET (2.5 MG) BY MOUTH DAILY 90 tablet 0     metFORMIN (GLUCOPHAGE-XR) 500 MG 24 hr tablet TAKE 2 TABLETS BY MOUTH DAILY WITH DINNER 180 tablet 0     warfarin (COUMADIN) 7.5 MG tablet TAKE 1 TABLET BY MOUTH DAILY. CURRENT DOSE IS 7.5 MG DAILY. DOSE ADJUSTED PER INR RESULT. 90 tablet 0     order for DME Equipment being ordered: Full face mask for CPAP -  size: F10 large 1 each 0     traZODone (DESYREL) 50 MG tablet TAKE 1/2-1 TABLET BY MOUTH NIGHTLY AS NEEDED, CAN TITRATE DOWN TO 1/2TAB OR UP TO 2TABS AS NEEDED 180 tablet 0     atorvastatin (LIPITOR) 20 MG tablet Take 1 tablet (20 mg) by mouth daily 90 tablet 0     spironolactone (ALDACTONE) 25 MG tablet Take 2 tablets (50 mg) by mouth daily 180 tablet 3     blood glucose monitoring (NO BRAND SPECIFIED) test strip Use to test blood sugars 4 times daily or as directed 300 strip 3     blood glucose monitoring (ACCU-CHEK MULTICLIX) lancets Use to test blood sugar 4 times daily or as directed. 4 Box 3     insulin pen needle (B-D U/F) 31G X 8 MM Use 4 times daily (with Lantus and Novolog) or as directed. 400 each 3     insulin pen needle (BD JANE U/F) 32G X 4 MM Use 4 daily as directed. 200 each 11     blood glucose monitoring (ACCU-CHEK SHANNON PLUS) test strip Use to test blood sugar 4 times daily or as directed.  Ok to substitute alternative if insurance prefers. 120 strip 11     blood glucose monitoring (ACCU-CHEK FASTCLIX) lancets Use to test blood sugar 4 times daily or as directed.  Ok to substitute alternative if insurance prefers. 1 Box 11     predniSONE (DELTASONE) 10 MG tablet Take 2 tablets for three days (4/12-14), then take 1 tablet daily (10 mg daily starting 4/15) 90 tablet 3     albuterol (PROAIR HFA, PROVENTIL HFA, VENTOLIN HFA) 108 (90 BASE) MCG/ACT inhaler Inhale 2 puffs into the lungs every 6 hours 1 Inhaler 3     order for DME Oxygen: Patient requires supplemental Oxygen 4 LPM via nasal canula with activity. Please provide patient with a home unit and with portability capability. Oxygen will be for a lifetime. 1 Device 0     acyclovir (ZOVIRAX) 5 % ointment Apply topically 6 times daily (Patient taking differently: Apply topically 6 times daily As needed for outbreaks) 15 g 3     fluticasone (FLOVENT HFA) 220 MCG/ACT inhaler Inhale 2 puffs into the lungs 2 times daily 3 Inhaler 3     acyclovir  (ZOVIRAX) 400 MG tablet Take 400 mg by mouth See Admin Instructions 5 times daily as needed for outbreaks       fluticasone (FLONASE) 50 MCG/ACT nasal spray Spray 1-2 sprays into both nostrils daily (Patient taking differently: Spray 1-2 sprays into both nostrils daily as needed for allergies ) 16 g 5     COMPRESSION STOCKINGS Wear compression stockings in affected leg (right leg) or both legs most of the time during the day and take them off at night. 2 each 2     acetaminophen (TYLENOL) 500 MG tablet Take 500 mg by mouth nightly as needed        furosemide (LASIX) 40 MG tablet Take 40 mg by mouth 2 times daily 180 tablet 3     potassium chloride SA (K-DUR/KLOR-CON M) 20 MEQ CR tablet Take 40 mEq by mouth daily 180 tablet 3     azithromycin (ZITHROMAX) 250 MG tablet Two tablets first day, then one tablet daily for four days. (Patient not taking: Reported on 2/16/2018) 6 tablet 0     metoprolol succinate (TOPROL-XL) 100 MG 24 hr tablet TAKE 1 TABLET (100 MG) BY MOUTH DAILY 90 tablet 3     pantoprazole (PROTONIX) 20 MG EC tablet Take 1-2 tablets (20-40 mg) by mouth daily (Patient not taking: Reported on 2/16/2018) 90 tablet 0     alendronate (FOSAMAX) 70 MG tablet Take 1 tablet (70 mg) by mouth every 7 days (Patient not taking: Reported on 1/31/2018) 4 tablet 11     ketoconazole (NIZORAL) 2 % cream Apply topically 2 times daily (Patient not taking: Reported on 1/31/2018) 60 g 1     polyethylene glycol (MIRALAX/GLYCOLAX) packet Take 17 g by mouth daily as needed for constipation (Patient not taking: Reported on 1/31/2018) 7 packet 0     No facility-administered medications prior to visit.        ROS:   CONSTITUTIONAL: Denies fever, chills, fatigue, or weight fluctuations.   HEENT: Denies headache, vision changes, and changes in speech.   CV: Refer to HPI.   PULMONARY:Denies shortness of breath, cough, or previous TB exposure.   GI:Denies nausea, vomiting, diarrhea, and abdominal pain. Bowel movements are regular.  "  :Denies urinary alterations, dysuria, urinary frequency, hematuria, and abnormal drainage.   EXT:Denies lower extremity edema.   SKIN:Denies abnormal rashes or lesions.   MUSCULOSKELETAL:Denies upper or lower extremity weakness and pain.   NEUROLOGIC:Denies lightheadedness, dizziness, seizures, or upper or lower extremity paresthesia.     EXAM:  /65 (BP Location: Left arm, Cuff Size: Adult Large)  Pulse 82  Ht 1.702 m (5' 7\")  Wt 97 kg (213 lb 14.4 oz)  SpO2 97%  BMI 33.5 kg/m2  GENERAL: Appears alert and oriented times three.   HEENT: Eye symmetrical and free of discharge bilaterally. Mucous membranes moist and without lesions.  NECK: Supple and without lymphadenopathy. JVD 10 cm.   CV: Irregular, controlled. S1S2 present without murmur, rub, or gallop.   RESPIRATORY: Respirations regular, even, and unlabored. Lungs CTA throughout. SOB when at 30 degrees.   GI: Soft and distended with normoactive bowel sounds present in all quadrants. No tenderness, rebound, guarding. No organomegaly.   EXTREMITIES: No peripheral edema. 2+ bilateral pedal pulses.   NEUROLOGIC: Alert and orientated x 3. CN II-XII grossly intact. No focal deficits.   MUSCULOSKELETAL: No joint swelling or tenderness.   SKIN: No jaundice. No rashes or lesions.     Labs:  CBC RESULTS:  Lab Results   Component Value Date    WBC 15.6 (H) 11/08/2017    RBC 4.59 11/08/2017    HGB 13.4 11/08/2017    HCT 42.1 11/08/2017    MCV 92 11/08/2017    MCH 29.2 11/08/2017    MCHC 31.8 11/08/2017    RDW 16.7 (H) 11/08/2017     11/08/2017       CMP RESULTS:  Lab Results   Component Value Date     02/16/2018    POTASSIUM 3.7 02/16/2018    CHLORIDE 102 02/16/2018    CO2 29 02/16/2018    ANIONGAP 7 02/16/2018     (H) 02/16/2018    BUN 19 02/16/2018    CR 0.85 02/16/2018    GFRESTIMATED 64 02/16/2018    GFRESTBLACK 78 02/16/2018    CLAUDY 9.0 02/16/2018    BILITOTAL 0.6 10/06/2017    ALBUMIN 3.1 (L) 10/06/2017    ALKPHOS 121 10/06/2017    ALT " 43 10/06/2017    AST 37 10/06/2017        INR RESULTS:  Lab Results   Component Value Date    INR 1.6 (A) 2018    INR 2.78 (H) 2017       Lab Results   Component Value Date    MAG 2.0 2017     Lab Results   Component Value Date    NTBNPI 3531 (H) 2017     Lab Results   Component Value Date    NTBNP 460 (H) 2018       Diagnostics:  Recent Results (from the past 4320 hour(s))   Echocardiogram Complete    Narrative    [id number]  [facility]19  [id number]  [id number]^MELINDA^LOS^           [facility]  Diagnostic and Treamtent-3rd Floor  [address] [place] [address]     Name: JEAN KENNEDY  MRN: [id number]  : 1940  Study Date: 10/17/2017 10:42 AM  Age: 77 yrs  Gender: Female  Patient Location: [facility]  Reason For Study: , Dyspnea, unspecified  Ordering Physician: LOS LAWRENCE  Referring Physician: LOS LAWRENCE  Performed By: Katerine Silva RDCS     BSA: 2.0 m2  Height: 66 in  Weight: 206 lb  HR: 95  BP: 125/82 mmHg  _____________________________________________________________________________  __        Procedure  Echocardiogram with two-dimensional, color and spectral Doppler performed.  Technically difficult study. Poor acoustic windows.  _____________________________________________________________________________  __        Interpretation Summary  Technically difficult study. Poor acoustic windows.  Global and regional left ventricular function is normal with an EF of 60-65%.  Mild right ventricular dilation is present. Global right ventricular function  is normal.  Pulmonary artery systolic pressure is normal.  The inferior vena cava is normal. Estimated mean right atrial pressure is 3  mmHg.  No pericardial effusion is present.  _____________________________________________________________________________  __        Left Ventricle  Global and regional left ventricular function is normal with an EF of 60-65%.  Left ventricular size  is normal. Borderline concentric wall thickening  consistent with left ventricular hypertrophy is present. Grade II or moderate  diastolic dysfunction. Regional wall motion cannot be assessed.     Right Ventricle  Global right ventricular function is normal. Mild right ventricular dilation  is present.     Atria  Mild biatrial enlargement is present. The atrial septum is intact as assessed  by color Doppler .     Mitral Valve  The mitral valve is normal. Mild mitral insufficiency is present.        Aortic Valve  Aortic valve is normal in structure and function.     Tricuspid Valve  The tricuspid valve is normal. Mild tricuspid insufficiency is present.  Pulmonary artery systolic pressure is normal. The right ventricular systolic  pressure is approximated at 23.6 mmHg plus the right atrial pressure.     Pulmonic Valve  Mild pulmonic insufficiency is present.     Vessels  The aorta root is normal. The inferior vena cava is normal. Estimated mean  right atrial pressure is 3 mmHg.     Pericardium  No pericardial effusion is present. Prominent epicardial fat is noted.        Compared to Previous Study  Comparison with prior studies is difficult due to poor image quality.  _____________________________________________________________________________  __  MMode/2D Measurements & Calculations  LA Volume (BP): 85.2 ml     LA Volume Index (BP): 42.0 ml/m2        Doppler Measurements & Calculations  MV E max gerald: 110.5 cm/sec  MV A max gerald: 28.4 cm/sec  MV E/A: 3.9  MV dec time: 0.16 sec  Ao V2 max: 113.0 cm/sec  Ao max P.0 mmHg  LV V1 max P.6 mmHg  LV V1 max: 106.7 cm/sec  LV V1 VTI: 22.2 cm  PA V2 max: 98.0 cm/sec  PA max PG: 3.8 mmHg  PI end-d gerald: 128.8 cm/sec  PI max gerald: 208.7 cm/sec  TR max gerald: 243.0 cm/sec  TR max P.6 mmHg     Pulm Sys Gerald: 17.4 cm/sec  Pulm Shay Gerald: 72.8 cm/sec  Pulm S/D: 0.24  Lateral E/e': 15.9  Med E to E': 13.9  Medial E/e': 13.9      _____________________________________________________________________________  __           Report approved by: Jd Daugherty 10/17/2017 01:53 PM          Assessment and Plan:   Ms. 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 She presents to [facility] for routine follow up and is hypervolemic.      Heart failure with preserved ejection fraction.   ACC Functional Class III  Rate control: HR-82. Toprol XL increased to 112.5 mg po daily.   volume status: Hypervolemic. Increase Lasix to 60 mg in AM and 40 mg at HS. Increase KCL to 40 MEQ in AM and 20 MEQ at HS. Repeat BMP next week.   Blood pressure control: BP stable. Lisinopril and Toprol XL.   Aldosterone antagonist: Aldactone 50 mg po daily   Evaluation of coronary arteries: Lexiscan negative for ischemia 6/14  Sleep apnea screening: ANGELICA on CPAP      HTN.   - BP controlled on current regimen.       Afib. Holter notes persistent afib 100% of the time with rates up to 140's at rest. CHADSVASC-5.    - Increase Toprol .5 mg po daily.   - Coumadin per Coumadin clinic.   - Follow up with EP scheduled for 2/21/18.      ILD and Follicular Bronchiolitis  - Management per Pulmonary.       GERD with PUD. History of GI bleed secondary to diverticular bleed 6/28/16 resolved with oral Vitamin K.    - Protonix to 40 mg po BID.       Sjogren's Disease.   - Management per Rheumatology.     Follow up BMP next week prior to EP appointment. Follow up in [facility] in 1 month.     Kiesha Elias  2/15/2018          JOSE LUIS ANAYA

## 2018-02-16 NOTE — PROGRESS NOTES
HPI:   Ms. 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  She is using her oxygen for long distances at [facility]. She presents to [facility] for routine follow up.     She complains of KIMBLE, which has progressed over the last 1-2 days. Weight is up 2-3 lbs at home. She complains of 2-3 pillow orthopnea. She complains of persistent lightheadedness, exacerbated by position changes that clears in seconds. She denies fever, chills, chest pain, palpitations, SOB, KIMBLE, PND, orthopnea, nausea, vomiting, diarrhea, hematochezia, melena, or LE edema. His weight remains stable at 209-211 lbs. Shee continues to follow a low sodium diet, but cheats with salted nuts.       PAST MEDICAL HISTORY:  Past Medical History:   Diagnosis Date     Alcohol abuse, in remission      Allergic rhinitis, cause unspecified     allegra helps when she takes it     Antiplatelet or antithrombotic long-term use      Atrial fibrillation (H)     in hosp in 11/11 after surgery w/ fluid overload     Cardiomegaly     LVH on stress echo- cardiac w/u at [facility]- neg CT scan for PE, neg stress echo in 8/06     Chest pain, unspecified      Disorder of bone and cartilage, unspecified     osteopenia (had been on prempro), improved on 6/06 dexa, stable dexa 11/10     Diverticulosis of colon (without mention of hemorrhage)     last episode yrs ago     Essential hypertension, benign      Gastro-oesophageal reflux disease      Insomnia, unspecified     weaned off clonazepam     Irregular heart beat      Lumbago 7/09    MRI with DJD, now seeing Dr. Cain for sciatic sx's     Major depressive disorder, recurrent episode, moderate (H)      Obstructive sleep apnea      Osteoarthrosis, unspecified whether generalized or  localized, unspecified site      Sjogren's syndrome (H)      Sleep apnea      Tobacco use disorder     chantix in 9/07, started again in 6/08, working       FAMILY HISTORY:  Family History   Problem Relation Age of Onset     C.A.D. Mother 63     MI- first at age 63     HEART DISEASE Mother      Hypertension Mother      CEREBROVASCULAR DISEASE Mother      Hyperlipidemia Mother      Alcohol/Drug Father      Alzheimer Disease Father      Dementia Father      Hypertension Father      Hyperlipidemia Father      C.A.D. Sister 52     Minor MI- age 50's     HEART DISEASE Sister      Hypertension Sister      Hypertension Sister      Hypertension Brother      Cancer - colorectal Sister 48     Late 40's early 50's     Prostate Cancer Brother 74     Dx'd age 74     GASTROINTESTINAL DISEASE Sister      Diverticulitis     GASTROINTESTINAL DISEASE Brother      Diverticulitis     Lipids Sister      Lipids Sister      Parkinsonism Brother      DIABETES Sister      HEART DISEASE Sister      CHF     CANCER Sister      lung, smoker     Substance Abuse Sister      Substance Abuse Brother      Asthma Sister      CANCER Sister      Breast Cancer Daughter      Prostate Cancer Brother      Hyperlipidemia Brother        SOCIAL HISTORY:  Social History     Social History     Marital status: Single     Spouse name: N/A     Number of children: 0     Years of education: Ed Spec De     Occupational History     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     Social History Main Topics     Smoking status: Former Smoker     Packs/day: 0.50     Years: 10.00     Types: Cigarettes     Quit date: 8/1/2011     Smokeless tobacco: Never Used      Comment: 1/2 ppd     Alcohol use No      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     Drug use: No     Sexual activity: No     Other Topics Concern     Not on file     Social History Narrative    Social Documentation:        Balanced Diet: YES    Calcium intake: 1-2 per day     Caffeine: 4-5 cups per day    Exercise:  type of activity limited right now due to foot injury    Sunscreen: No    Seatbelts:  Yes    Self Breast Exam:  Yes    Self Testicular Exam: n/a    Physical/Emotional/Sexual Abuse: No    Do you feel safe in your environment? No-pt lives in [facility]        Cholesterol screen up to date: No-will check today    Eye Exam up to date: Yes    Dental Exam up to date: Yes    Pap smear up to date: Does Not Apply    Mammogram up to date: Yes-10/07    Dexa Scan up to date: Yes-2006    Colonoscopy up to date: Yes-2006    Immunizations up to date: Yes-td 2002    Glucose screen if over 40:  Yes    '09                       CURRENT MEDICATIONS:  Outpatient Medications Prior to Visit   Medication Sig Dispense Refill     escitalopram (LEXAPRO) 10 MG tablet TAKE 1 TABLET (10 MG) BY MOUTH DAILY 90 tablet 0     LANTUS SOLOSTAR 100 UNIT/ML soln INJECT 25 UNTIS SUBCUTANEOUSLY EVERY DAY 15 mL 0     LANTUS SOLOSTAR 100 UNIT/ML soln INJECT 25 UNTIS SUBCUTANEOUSLY EVERY DAY 15 mL 0     NOVOLOG FLEXPEN 100 UNIT/ML soln INJECT 12 UNITS SUBCUTANEOUS 3 TIMES DAILY (WITH MEALS) 15 mL 0     montelukast (SINGULAIR) 10 MG tablet TAKE 1 TABLET BY MOUTH AT BEDTIME 90 tablet 1     lisinopril (PRINIVIL/ZESTRIL) 2.5 MG tablet TAKE 1 TABLET (2.5 MG) BY MOUTH DAILY 90 tablet 0     metFORMIN (GLUCOPHAGE-XR) 500 MG 24 hr tablet TAKE 2 TABLETS BY MOUTH DAILY WITH DINNER 180 tablet 0     warfarin (COUMADIN) 7.5 MG tablet TAKE 1 TABLET BY MOUTH DAILY. CURRENT DOSE IS 7.5 MG DAILY. DOSE ADJUSTED PER INR RESULT. 90 tablet 0     order for DME Equipment being ordered: Full face mask for CPAP - size: F10 large 1 each 0     traZODone (DESYREL) 50 MG tablet TAKE 1/2-1 TABLET BY MOUTH NIGHTLY AS NEEDED, CAN TITRATE DOWN TO 1/2TAB OR UP TO 2TABS AS NEEDED 180 tablet 0     atorvastatin (LIPITOR) 20 MG tablet Take 1 tablet (20 mg) by mouth daily 90 tablet 0     spironolactone (ALDACTONE) 25 MG tablet Take 2 tablets (50 mg) by  mouth daily 180 tablet 3     blood glucose monitoring (NO BRAND SPECIFIED) test strip Use to test blood sugars 4 times daily or as directed 300 strip 3     blood glucose monitoring (ACCU-CHEK MULTICLIX) lancets Use to test blood sugar 4 times daily or as directed. 4 Box 3     insulin pen needle (B-D U/F) 31G X 8 MM Use 4 times daily (with Lantus and Novolog) or as directed. 400 each 3     insulin pen needle (BD JANE U/F) 32G X 4 MM Use 4 daily as directed. 200 each 11     blood glucose monitoring (ACCU-CHEK SHANNON PLUS) test strip Use to test blood sugar 4 times daily or as directed.  Ok to substitute alternative if insurance prefers. 120 strip 11     blood glucose monitoring (ACCU-CHEK FASTCLIX) lancets Use to test blood sugar 4 times daily or as directed.  Ok to substitute alternative if insurance prefers. 1 Box 11     predniSONE (DELTASONE) 10 MG tablet Take 2 tablets for three days (4/12-14), then take 1 tablet daily (10 mg daily starting 4/15) 90 tablet 3     albuterol (PROAIR HFA, PROVENTIL HFA, VENTOLIN HFA) 108 (90 BASE) MCG/ACT inhaler Inhale 2 puffs into the lungs every 6 hours 1 Inhaler 3     order for DME Oxygen: Patient requires supplemental Oxygen 4 LPM via nasal canula with activity. Please provide patient with a home unit and with portability capability. Oxygen will be for a lifetime. 1 Device 0     acyclovir (ZOVIRAX) 5 % ointment Apply topically 6 times daily (Patient taking differently: Apply topically 6 times daily As needed for outbreaks) 15 g 3     fluticasone (FLOVENT HFA) 220 MCG/ACT inhaler Inhale 2 puffs into the lungs 2 times daily 3 Inhaler 3     acyclovir (ZOVIRAX) 400 MG tablet Take 400 mg by mouth See Admin Instructions 5 times daily as needed for outbreaks       fluticasone (FLONASE) 50 MCG/ACT nasal spray Spray 1-2 sprays into both nostrils daily (Patient taking differently: Spray 1-2 sprays into both nostrils daily as needed for allergies ) 16 g 5     COMPRESSION STOCKINGS Wear  compression stockings in affected leg (right leg) or both legs most of the time during the day and take them off at night. 2 each 2     acetaminophen (TYLENOL) 500 MG tablet Take 500 mg by mouth nightly as needed        furosemide (LASIX) 40 MG tablet Take 40 mg by mouth 2 times daily 180 tablet 3     potassium chloride SA (K-DUR/KLOR-CON M) 20 MEQ CR tablet Take 40 mEq by mouth daily 180 tablet 3     azithromycin (ZITHROMAX) 250 MG tablet Two tablets first day, then one tablet daily for four days. (Patient not taking: Reported on 2/16/2018) 6 tablet 0     metoprolol succinate (TOPROL-XL) 100 MG 24 hr tablet TAKE 1 TABLET (100 MG) BY MOUTH DAILY 90 tablet 3     pantoprazole (PROTONIX) 20 MG EC tablet Take 1-2 tablets (20-40 mg) by mouth daily (Patient not taking: Reported on 2/16/2018) 90 tablet 0     alendronate (FOSAMAX) 70 MG tablet Take 1 tablet (70 mg) by mouth every 7 days (Patient not taking: Reported on 1/31/2018) 4 tablet 11     ketoconazole (NIZORAL) 2 % cream Apply topically 2 times daily (Patient not taking: Reported on 1/31/2018) 60 g 1     polyethylene glycol (MIRALAX/GLYCOLAX) packet Take 17 g by mouth daily as needed for constipation (Patient not taking: Reported on 1/31/2018) 7 packet 0     No facility-administered medications prior to visit.        ROS:   CONSTITUTIONAL: Denies fever, chills, fatigue, or weight fluctuations.   HEENT: Denies headache, vision changes, and changes in speech.   CV: Refer to HPI.   PULMONARY:Denies shortness of breath, cough, or previous TB exposure.   GI:Denies nausea, vomiting, diarrhea, and abdominal pain. Bowel movements are regular.   :Denies urinary alterations, dysuria, urinary frequency, hematuria, and abnormal drainage.   EXT:Denies lower extremity edema.   SKIN:Denies abnormal rashes or lesions.   MUSCULOSKELETAL:Denies upper or lower extremity weakness and pain.   NEUROLOGIC:Denies lightheadedness, dizziness, seizures, or upper or lower extremity  "paresthesia.     EXAM:  /65 (BP Location: Left arm, Cuff Size: Adult Large)  Pulse 82  Ht 1.702 m (5' 7\")  Wt 97 kg (213 lb 14.4 oz)  SpO2 97%  BMI 33.5 kg/m2  GENERAL: Appears alert and oriented times three.   HEENT: Eye symmetrical and free of discharge bilaterally. Mucous membranes moist and without lesions.  NECK: Supple and without lymphadenopathy. JVD 10 cm.   CV: Irregular, controlled. S1S2 present without murmur, rub, or gallop.   RESPIRATORY: Respirations regular, even, and unlabored. Lungs CTA throughout. SOB when at 30 degrees.   GI: Soft and distended with normoactive bowel sounds present in all quadrants. No tenderness, rebound, guarding. No organomegaly.   EXTREMITIES: No peripheral edema. 2+ bilateral pedal pulses.   NEUROLOGIC: Alert and orientated x 3. CN II-XII grossly intact. No focal deficits.   MUSCULOSKELETAL: No joint swelling or tenderness.   SKIN: No jaundice. No rashes or lesions.     Labs:  CBC RESULTS:  Lab Results   Component Value Date    WBC 15.6 (H) 11/08/2017    RBC 4.59 11/08/2017    HGB 13.4 11/08/2017    HCT 42.1 11/08/2017    MCV 92 11/08/2017    MCH 29.2 11/08/2017    MCHC 31.8 11/08/2017    RDW 16.7 (H) 11/08/2017     11/08/2017       CMP RESULTS:  Lab Results   Component Value Date     02/16/2018    POTASSIUM 3.7 02/16/2018    CHLORIDE 102 02/16/2018    CO2 29 02/16/2018    ANIONGAP 7 02/16/2018     (H) 02/16/2018    BUN 19 02/16/2018    CR 0.85 02/16/2018    GFRESTIMATED 64 02/16/2018    GFRESTBLACK 78 02/16/2018    CLAUDY 9.0 02/16/2018    BILITOTAL 0.6 10/06/2017    ALBUMIN 3.1 (L) 10/06/2017    ALKPHOS 121 10/06/2017    ALT 43 10/06/2017    AST 37 10/06/2017        INR RESULTS:  Lab Results   Component Value Date    INR 1.6 (A) 01/31/2018    INR 2.78 (H) 12/19/2017       Lab Results   Component Value Date    MAG 2.0 04/11/2017     Lab Results   Component Value Date    NTBNPI 3531 (H) 04/06/2017     Lab Results   Component Value Date    NTBNP 460 " (H) 2018       Diagnostics:  Recent Results (from the past 4320 hour(s))   Echocardiogram Complete    Narrative    [id number]  [facility]  [id number]  [id number]^MELINDA^LOS^           [facility]  Diagnostic and Treamtent-3rd Floor  [address] [place] [address]     Name: JEAN KENNEDY  MRN: [id number]  : 1940  Study Date: 10/17/2017 10:42 AM  Age: 77 yrs  Gender: Female  [place] [facility] [place] For Study: , Dyspnea, unspecified  Ordering Physician: LOS LAWRENCE  Referring Physician: LOS LAWRENCE  Performed By: Katerine Silva RDCS     BSA: 2.0 m2  Height: 66 in  Weight: 206 lb  HR: 95  BP: 125/82 mmHg  _____________________________________________________________________________  __        Procedure  Echocardiogram with two-dimensional, color and spectral Doppler performed.  Technically difficult study. Poor acoustic windows.  _____________________________________________________________________________  __        Interpretation Summary  Technically difficult study. Poor acoustic windows.  Global and regional left ventricular function is normal with an EF of 60-65%.  Mild right ventricular dilation is present. Global right ventricular function  is normal.  Pulmonary artery systolic pressure is normal.  The inferior vena cava is normal. Estimated mean right atrial pressure is 3  mmHg.  No pericardial effusion is present.  _____________________________________________________________________________  __        Left Ventricle  Global and regional left ventricular function is normal with an EF of 60-65%.  Left ventricular size is normal. Borderline concentric wall thickening  consistent with left ventricular hypertrophy is present. Grade II or moderate  diastolic dysfunction. Regional wall motion cannot be assessed.     Right Ventricle  Global right ventricular function is normal. Mild right ventricular dilation  is present.     Atria  Mild biatrial  enlargement is present. The atrial septum is intact as assessed  by color Doppler .     Mitral Valve  The mitral valve is normal. Mild mitral insufficiency is present.        Aortic Valve  Aortic valve is normal in structure and function.     Tricuspid Valve  The tricuspid valve is normal. Mild tricuspid insufficiency is present.  Pulmonary artery systolic pressure is normal. The right ventricular systolic  pressure is approximated at 23.6 mmHg plus the right atrial pressure.     Pulmonic Valve  Mild pulmonic insufficiency is present.     Vessels  The aorta root is normal. The inferior vena cava is normal. Estimated mean  right atrial pressure is 3 mmHg.     Pericardium  No pericardial effusion is present. Prominent epicardial fat is noted.        Compared to Previous Study  Comparison with prior studies is difficult due to poor image quality.  _____________________________________________________________________________  __  MMode/2D Measurements & Calculations  LA Volume (BP): 85.2 ml     LA Volume Index (BP): 42.0 ml/m2        Doppler Measurements & Calculations  MV E max gerald: 110.5 cm/sec  MV A max gerald: 28.4 cm/sec  MV E/A: 3.9  MV dec time: 0.16 sec  Ao V2 max: 113.0 cm/sec  Ao max P.0 mmHg  LV V1 max P.6 mmHg  LV V1 max: 106.7 cm/sec  LV V1 VTI: 22.2 cm  PA V2 max: 98.0 cm/sec  PA max PG: 3.8 mmHg  PI end-d gerald: 128.8 cm/sec  PI max gerald: 208.7 cm/sec  TR max gerald: 243.0 cm/sec  TR max P.6 mmHg     Pulm Sys Gerald: 17.4 cm/sec  Pulm Shay Gerald: 72.8 cm/sec  Pulm S/D: 0.24  Lateral E/e': 15.9  Med E to E': 13.9  Medial E/e': 13.9     _____________________________________________________________________________  __           Report approved by: Jd Daugherty 10/17/2017 01:53 PM          Assessment and Plan:   Ms. Tee is a 77 year old female with a past medical history including SCHF, AFib, HTN, GERD, ANGELICA, Depression, Diverticular abscess s/p resection with ileostomy and takedown, Sjogrens, ILD with  questionable Ig4 deficiency on chronic steroids and home oxygen, follicular bronchiolitis, and GI bleed. She presents to [facility] for routine follow up and is hypervolemic.      Heart failure with preserved ejection fraction.   ACC Functional Class III  Rate control: HR-82. Toprol XL increased to 112.5 mg po daily.   volume status: Hypervolemic. Increase Lasix to 60 mg in AM and 40 mg at HS. Increase KCL to 40 MEQ in AM and 20 MEQ at HS. Repeat BMP next week.   Blood pressure control: BP stable. Lisinopril and Toprol XL.   Aldosterone antagonist: Aldactone 50 mg po daily   Evaluation of coronary arteries: Lexiscan negative for ischemia 6/14  Sleep apnea screening: ANGELICA on CPAP      HTN.   - BP controlled on current regimen.       Afib. Holter notes persistent afib 100% of the time with rates up to 140's at rest. CHADSVASC-5.    - Increase Toprol .5 mg po daily.   - Coumadin per Coumadin clinic.   - Follow up with EP scheduled for 2/21/18.      ILD and Follicular Bronchiolitis  - Management per Pulmonary.       GERD with PUD. History of GI bleed secondary to diverticular bleed 6/28/16 resolved with oral Vitamin K.    - Protonix to 40 mg po BID.       Sjogren's Disease.   - Management per Rheumatology.     Follow up BMP next week prior to EP appointment. Follow up in [facility] in 1 month.     Kiesha Elias  2/15/2018          JOSE LUIS ANAYA

## 2018-02-16 NOTE — MR AVS SNAPSHOT
After Visit Summary   2/16/2018    Betty Kennedy    MRN: [id number]           Patient Information     Date Of Birth          1940        Visit Information        Provider Department      2/16/2018 12:00 PM Kiesha Elias APRN [facility]        Today's Diagnoses     Persistent atrial fibrillation (H)    -  1    (HFpEF) heart failure with preserved ejection fraction (H)        Atrial fibrillation with controlled ventricular response (H)          Care Instructions    You were seen today in the Cardiovascular Clinic at the [facility].     Cardiology Providers you saw during your visit: Kiesha Elias NP     1. Increase Lasix to 60 mg in the morning, 40 mg in the afternoon.   2. Increase Potassium to 40 mg in the morning, 20 mg in the afternoon.   3. Increase Metoprolol (Toprol XL) to 112.5 mg daily ( 100 mg tab plus half of one 25 mg tab)  4. Schedule a lab prior to visit with Dr Perez next week  5. Follow up with [facility] in 1 month      Results for BETTY KENNEDY (MRN [id number]) as of 2/16/2018 11:57   Ref. Range 2/16/2018 11:08   Sodium Latest Ref Range: 133 - 144 mmol/L 138   Potassium Latest Ref Range: 3.4 - 5.3 mmol/L 3.7   Chloride Latest Ref Range: 94 - 109 mmol/L 102   Carbon Dioxide Latest Ref Range: 20 - 32 mmol/L 29   Urea Nitrogen Latest Ref Range: 7 - 30 mg/dL 19   Creatinine Latest Ref Range: 0.52 - 1.04 mg/dL 0.85   GFR Estimate Latest Ref Range: >60 mL/min/1.7m2 64   GFR Estimate If Black Latest Ref Range: >60 mL/min/1.7m2 78   Calcium Latest Ref Range: 8.5 - 10.1 mg/dL 9.0   Anion Gap Latest Ref Range: 3 - 14 mmol/L 7   Glucose Latest Ref Range: 70 - 99 mg/dL 116 (H)     Please limit your fluid intake to 2 L (64 ounces) daily.  2 Liters a day = 8.5 cups, or 72 ounces.  Please limit your salt intake to 2 grams a day or less.     If you gain 2# in 24 hours or 5# in one week call Beatriz Blanco RN so we can adjust your medications as needed  "over the phone.     Please feel free to call me with any questions or concerns.       Beatriz Blanco RN  [facility]  Cardiology Care Coordinator-Heart Failure Clinic     Questions and [phone].   First press #1 for the University and then press #3 for \"Medical Questions\" to reach us Cardiology Nurses.      On Call Cardiologist for after hours or on weekends: [phone]   option #4 and ask to speak to the on-call Cardiologist. Inform them you are a CORE/heart failure patient at the [place].           If you need a medication refill please contact your pharmacy.  Please allow 3 business days for your refill to be completed.  _______________________________________________________  C.O.R.E. CLINIC Cardiomyopathy, Optimization, Rehabilitation, Education   The C.O.R.E. CLINIC is a heart failure specialty clinic within the [facility] where you will work with specialized nurse practitioners dedicated to helping patients with heart failure carefully adjust medications, receive education, and learn who and when to call if symptoms develop. They specialize in helping you better understand your condition, slow the progression of your disease, improve the length and quality of your life, help you detect future heart problems before they become life threatening, and avoid hospitalizations.  As always, thank you for trusting us with your health care needs!             Follow-ups after your visit        Additional Services     Follow-Up with [facility]       Danny in 1 month                  Your next 10 appointments already scheduled     2018 12:30 PM CST   Lab with  LAB    Health Lab ([facility] [place])    [address] [place] [address] [phone]            2018  1:00 PM CST   (Arrive by 12:45 PM)   NEW ARRHYTHMIA with Anderson Perez MD   [facility] ([facility] " [facility])    [address]  [address]  [facility] [address]   [phone]            Mar 07, 2018  4:30 PM CST   Lab with  LAB    Health Lab ([facility])    [address] [place] [address]  [address] [facility] [address]   [phone]            Mar 07, 2018  5:00 PM CST   (Arrive by 4:45 PM)   CORE RETURN with LIZY Tyler [facility] ([facility])    [address] [place] [address]  [address]  [facility] [address]   [phone]            Mar 08, 2018  3:00 PM CST   (Arrive by 2:45 PM)   Return Visit with Carmenza Stringer MD   [facility] ([facility])    [address] [place] [address]  [address]  [facility] [address]   [phone]            Mar 28, 2018  1:00 PM CDT   Office Visit with Ilene Tristan MD   [facility] ([facility])    [address] [facility] [address] [phone]           Bring a current list of meds and any records pertaining to this visit. For Physicals, please bring immunization records and any forms needing to be filled out. Please arrive 10 minutes early to complete paperwork.            Apr 26, 2018  3:00 PM CDT   Lab with  LAB    Health Lab ([facility])    [address] [place] [address]  [address] [facility] [address]   [phone]            Apr 26, 2018  3:30 PM CDT   (Arrive by 3:15 PM)   RETURN HEART FAILURE with Radha Rosa MD   [facility] ([facility])    [address] [place] [address]  [address]  [facility] [address]   [phone]            Jul 18, 2018 12:30 PM CDT   FULL PULMONARY FUNCTION with  PFL C   [facility] Pulmonary Function Testing ([facility])    [address] [place] [address]  3rd Floor  [facility] [address]   [phone]            Jul 18, 2018  1:30 PM CDT   (Arrive by 1:15 PM)   Return Interstitial  "Lung with Meño Walsh MD   [facility] ([facility])    [address] [facility]  [address] [place] [address] [phone]              Future tests that were ordered for you today     Open Future Orders        [facility] Expected Expires Ordered    Basic metabolic panel Routine 2/21/2018 2/16/2019 2/16/2018    Follow-Up with [facility] Routine 3/23/2018 5/24/2018 2/16/2018            Who to contact     If you have questions or need follow up information about today's clinic visit or your schedule please contact [facility] directly at [phone].  Normal or non-critical lab and imaging results will be communicated to you by [employer], letter or phone within 4 business days after the clinic has received the results. If you do not hear from us within 7 days, please contact the clinic through [employer] or phone. If you have a critical or abnormal lab result, we will notify you by phone as soon as possible.  Submit refill requests through [employer] or call your pharmacy and they will forward the refill request to us. Please allow 3 business days for your refill to be completed.          Additional Information About Your Visit        [employer] Information     [employer] gives you secure access to your electronic health record. If you see a primary care provider, you can also send messages to your care team and make appointments. If you have questions, please call your primary care clinic.  If you do not have a primary care provider, please call [phone] and they will assist you.        Care EveryWhere ID     This is your Care EveryWhere ID. This could be used by other organizations to access your [facility] medical records  [id number]        Your Vitals Were     Pulse Height Pulse Oximetry BMI (Body Mass Index)          82 1.702 m (5' 7\") 97% 33.5 kg/m2         Blood Pressure from Last 3 Encounters:   02/16/18 104/65   01/31/18 105/68   01/10/18 136/90    " Weight from Last 3 Encounters:   02/16/18 97 kg (213 lb 14.4 oz)   01/31/18 96.4 kg (212 lb 9.6 oz)   11/08/17 93.9 kg (207 lb)                 Today's Medication Changes          These changes are accurate as of 2/16/18 12:39 PM.  If you have any questions, ask your nurse or doctor.               These medicines have changed or have updated prescriptions.        Dose/Directions    acyclovir 5 % ointment   Commonly known as:  ZOVIRAX   This may have changed:  additional instructions   Used for:  Recurrent cold sores        Apply topically 6 times daily   Quantity:  15 g   Refills:  3       fluticasone 50 MCG/ACT spray   Commonly known as:  FLONASE   This may have changed:    - when to take this  - reasons to take this   Used for:  Seasonal allergic rhinitis        Dose:  1-2 spray   Spray 1-2 sprays into both nostrils daily   Quantity:  16 g   Refills:  5       furosemide 40 MG tablet   Commonly known as:  LASIX   This may have changed:    - how much to take  - how to take this  - when to take this  - additional instructions   Used for:  (HFpEF) heart failure with preserved ejection fraction (H)   Changed by:  Kiesha Elias APRN CNP        Increase Lasix to 60 mg po in AM and 40 mg at HS   Quantity:  180 tablet   Refills:  3       * metoprolol succinate 25 MG 24 hr tablet   Commonly known as:  TOPROL XL   This may have changed:  You were already taking a medication with the same name, and this prescription was added. Make sure you understand how and when to take each.   Used for:  (HFpEF) heart failure with preserved ejection fraction (H), Persistent atrial fibrillation (H)   Changed by:  Kiesha Elias APRN CNP        Take with 100 mg to complete 112.5 mg po daily.   Quantity:  15 tablet   Refills:  0       * metoprolol succinate 100 MG 24 hr tablet   Commonly known as:  TOPROL-XL   This may have changed:  See the new instructions.   Used for:  Atrial fibrillation with controlled ventricular response (H)    Changed by:  Kiesha Elias APRN CNP        Take with 12.5 mg to complete 112.5 mg po daily.   Quantity:  90 tablet   Refills:  3       potassium chloride SA 20 MEQ CR tablet   Commonly known as:  K-DUR/KLOR-CON M   This may have changed:    - how much to take  - how to take this  - when to take this  - additional instructions   Used for:  (HFpEF) heart failure with preserved ejection fraction (H)   Changed by:  Kiesha Elias APRN CNP        Increase KCL to 40 MEQ in AM and 20 MEQ in the evening.   Quantity:  180 tablet   Refills:  3       * Notice:  This list has 2 medication(s) that are the same as other medications prescribed for you. Read the directions carefully, and ask your doctor or other care provider to review them with you.         Where to get your medicines      These medications were sent to [facility]/pharmacy #[id number] - [facility], [place] - [address] [address]     Phone:  [phone]     metoprolol succinate 25 MG 24 hr tablet                Primary Care Provider Office Phone # Fax #    Ilene Tristan [phone] [address] [address]        Equal Access to Services     GENNY STONER : Hadii jossue sellers hadumiaro Soomaali, waaxda luqadaha, qaybta kaalmada adeegyada, anderson shaikh. So [facility] [phone].    ATENCIÓN: Si habla español, tiene a conti disposición servicios gratramandeepos de asistencia lingüística. Llame al [phone].    We comply with applicable federal civil rights laws and [place] laws. We do not discriminate on the basis of race, color, national origin, age, disability, sex, sexual orientation, or gender identity.            Thank you!     Thank you for choosing [facility]  for your care. Our goal is always to provide you with excellent care. Hearing back from our patients is one way we can continue to improve our services. Please take a few  minutes to complete the written survey that you may receive in the mail after your visit with us. Thank you!             Your Updated Medication List - Protect others around you: Learn how to safely use, store and throw away your medicines at www.disposemymeds.org.          This list is accurate as of 2/16/18 12:39 PM.  Always use your most recent med list.                   Brand Name Dispense Instructions for use Diagnosis    acyclovir 400 MG tablet    ZOVIRAX     Take 400 mg by mouth See Admin Instructions 5 times daily as needed for outbreaks        acyclovir 5 % ointment    ZOVIRAX    15 g    Apply topically 6 times daily    Recurrent cold sores       albuterol 108 (90 BASE) MCG/ACT Inhaler    PROAIR HFA/PROVENTIL HFA/VENTOLIN HFA    1 Inhaler    Inhale 2 puffs into the lungs every 6 hours    ILD (interstitial lung disease) (H)       alendronate 70 MG tablet    FOSAMAX    4 tablet    Take 1 tablet (70 mg) by mouth every 7 days    Other osteoporosis without current pathological fracture       atorvastatin 20 MG tablet    LIPITOR    90 tablet    Take 1 tablet (20 mg) by mouth daily    Hyperlipidemia LDL goal <100       azithromycin 250 MG tablet    ZITHROMAX    6 tablet    Two tablets first day, then one tablet daily for four days.    Acute sinusitis with symptoms > 10 days       * blood glucose monitoring lancets     4 Box    Use to test blood sugar 4 times daily or as directed.    Type 2 diabetes mellitus without complication, without long-term current use of insulin (H)       * blood glucose monitoring lancets     1 Box    Use to test blood sugar 4 times daily or as directed.  Ok to substitute alternative if insurance prefers.    Type 2 diabetes mellitus without complication, without long-term current use of insulin (H)       * blood glucose monitoring test strip    no brand specified    300 strip    Use to test blood sugars 4 times daily or as directed    Type 2 diabetes mellitus without complication, without  long-term current use of insulin (H)       * blood glucose monitoring test strip    ACCU-CHEK SHANNON PLUS    120 strip    Use to test blood sugar 4 times daily or as directed.  Ok to substitute alternative if insurance prefers.    Type 2 diabetes mellitus without complication, without long-term current use of insulin (H)       COMPRESSION STOCKINGS     2 each    Wear compression stockings in affected leg (right leg) or both legs most of the time during the day and take them off at night.    DVT (deep venous thrombosis), right, Postphlebitic syndrome, Chronic anticoagulation, Atrial fibrillation (H)       escitalopram 10 MG tablet    LEXAPRO    90 tablet    TAKE 1 TABLET (10 MG) BY MOUTH DAILY    Major depressive disorder, recurrent episode, moderate (H)       fluticasone 220 MCG/ACT Inhaler    FLOVENT HFA    3 Inhaler    Inhale 2 puffs into the lungs 2 times daily    ILD (interstitial lung disease) (H), Follicular bronchiolitis (H)       fluticasone 50 MCG/ACT spray    FLONASE    16 g    Spray 1-2 sprays into both nostrils daily    Seasonal allergic rhinitis       furosemide 40 MG tablet    LASIX    180 tablet    Increase Lasix to 60 mg po in AM and 40 mg at HS    (HFpEF) heart failure with preserved ejection fraction (H)       * insulin pen needle 31G X 8 MM    B-D U/F    400 each    Use 4 times daily (with Lantus and Novolog) or as directed.    Type 2 diabetes mellitus without complication, without long-term current use of insulin (H)       * insulin pen needle 32G X 4 MM    BD JANE U/F    200 each    Use 4 daily as directed.    Type 2 diabetes mellitus without complication, without long-term current use of insulin (H)       ketoconazole 2 % cream    NIZORAL    60 g    Apply topically 2 times daily    Cutaneous candidiasis       * LANTUS SOLOSTAR 100 UNIT/ML injection   Generic drug:  insulin glargine     15 mL    INJECT 25 UNTIS SUBCUTANEOUSLY EVERY DAY    Type 2 diabetes mellitus with hyperglycemia, with  long-term current use of insulin (H)       * LANTUS SOLOSTAR 100 UNIT/ML injection   Generic drug:  insulin glargine     15 mL    INJECT 25 UNTIS SUBCUTANEOUSLY EVERY DAY    Type 2 diabetes mellitus with hyperglycemia, with long-term current use of insulin (H)       lisinopril 2.5 MG tablet    PRINIVIL/Zestril    90 tablet    TAKE 1 TABLET (2.5 MG) BY MOUTH DAILY    Essential hypertension with goal blood pressure less than 140/90       metFORMIN 500 MG 24 hr tablet    GLUCOPHAGE-XR    180 tablet    TAKE 2 TABLETS BY MOUTH DAILY WITH DINNER    Type 2 diabetes mellitus without complication, without long-term current use of insulin (H)       * metoprolol succinate 25 MG 24 hr tablet    TOPROL XL    15 tablet    Take with 100 mg to complete 112.5 mg po daily.    (HFpEF) heart failure with preserved ejection fraction (H), Persistent atrial fibrillation (H)       * metoprolol succinate 100 MG 24 hr tablet    TOPROL-XL    90 tablet    Take with 12.5 mg to complete 112.5 mg po daily.    Atrial fibrillation with controlled ventricular response (H)       montelukast 10 MG tablet    SINGULAIR    90 tablet    TAKE 1 TABLET BY MOUTH AT BEDTIME    Sjogren's syndrome with lung involvement (H), ILD (interstitial lung disease) (H), Chronic seasonal allergic rhinitis due to other allergen       NovoLOG FLEXPEN 100 UNIT/ML injection   Generic drug:  insulin aspart     15 mL    INJECT 12 UNITS SUBCUTANEOUS 3 TIMES DAILY (WITH MEALS)    Type 2 diabetes mellitus with other specified complication (H)       * order for DME     1 Device    Oxygen: Patient requires supplemental Oxygen 4 LPM via nasal canula with activity. Please provide patient with a home unit and with portability capability. Oxygen will be for a lifetime.    Hypoxia, ILD (interstitial lung disease) (H)       * order for DME     1 each    Equipment being ordered: Full face mask for CPAP - size: F10 large    ANGELICA (obstructive sleep apnea)       pantoprazole 20 MG EC tablet     PROTONIX    90 tablet    Take 1-2 tablets (20-40 mg) by mouth daily    Gastroesophageal reflux disease without esophagitis       polyethylene glycol Packet    MIRALAX/GLYCOLAX    7 packet    Take 17 g by mouth daily as needed for constipation    Constipation, unspecified constipation type       potassium chloride SA 20 MEQ CR tablet    K-DUR/KLOR-CON M    180 tablet    Increase KCL to 40 MEQ in AM and 20 MEQ in the evening.    (HFpEF) heart failure with preserved ejection fraction (H)       predniSONE 10 MG tablet    DELTASONE    90 tablet    Take 2 tablets for three days (4/12-14), then take 1 tablet daily (10 mg daily starting 4/15)    ILD (interstitial lung disease) (H), Sjogren's syndrome (H)       spironolactone 25 MG tablet    ALDACTONE    180 tablet    Take 2 tablets (50 mg) by mouth daily    Chronic diastolic congestive heart failure (H)       traZODone 50 MG tablet    DESYREL    180 tablet    TAKE 1/2-1 TABLET BY MOUTH NIGHTLY AS NEEDED, CAN TITRATE DOWN TO 1/2TAB OR UP TO 2TABS AS NEEDED    Insomnia due to medical condition       TYLENOL 500 MG tablet   Generic drug:  acetaminophen      Take 500 mg by mouth nightly as needed    Dizziness       warfarin 7.5 MG tablet    COUMADIN    90 tablet    TAKE 1 TABLET BY MOUTH DAILY. CURRENT DOSE IS 7.5 MG DAILY. DOSE ADJUSTED PER INR RESULT.    Atrial fibrillation with controlled ventricular response (H)       * Notice:  This list has 12 medication(s) that are the same as other medications prescribed for you. Read the directions carefully, and ask your doctor or other care provider to review them with you.

## 2018-02-21 ENCOUNTER — OFFICE VISIT (OUTPATIENT)
Dept: CARDIOLOGY | Facility: CLINIC | Age: 78
End: 2018-02-21
Attending: INTERNAL MEDICINE
Payer: MEDICARE

## 2018-02-21 VITALS
DIASTOLIC BLOOD PRESSURE: 72 MMHG | HEIGHT: 67 IN | WEIGHT: 213.5 LBS | SYSTOLIC BLOOD PRESSURE: 110 MMHG | BODY MASS INDEX: 33.51 KG/M2 | OXYGEN SATURATION: 96 % | HEART RATE: 94 BPM

## 2018-02-21 DIAGNOSIS — I48.91 ATRIAL FIBRILLATION WITH CONTROLLED VENTRICULAR RESPONSE (H): ICD-10-CM

## 2018-02-21 DIAGNOSIS — I48.21 PERMANENT ATRIAL FIBRILLATION (H): Primary | ICD-10-CM

## 2018-02-21 DIAGNOSIS — I50.30 (HFPEF) HEART FAILURE WITH PRESERVED EJECTION FRACTION (H): ICD-10-CM

## 2018-02-21 DIAGNOSIS — E78.5 HYPERLIPIDEMIA LDL GOAL <100: ICD-10-CM

## 2018-02-21 DIAGNOSIS — I48.19 PERSISTENT ATRIAL FIBRILLATION (H): ICD-10-CM

## 2018-02-21 LAB
ANION GAP SERPL CALCULATED.3IONS-SCNC: 8 MMOL/L (ref 3–14)
BUN SERPL-MCNC: 22 MG/DL (ref 7–30)
CALCIUM SERPL-MCNC: 9.3 MG/DL (ref 8.5–10.1)
CHLORIDE SERPL-SCNC: 102 MMOL/L (ref 94–109)
CHOLEST SERPL-MCNC: 107 MG/DL
CO2 SERPL-SCNC: 26 MMOL/L (ref 20–32)
CREAT SERPL-MCNC: 0.88 MG/DL (ref 0.52–1.04)
GFR SERPL CREATININE-BSD FRML MDRD: 62 ML/MIN/1.7M2
GLUCOSE SERPL-MCNC: 100 MG/DL (ref 70–99)
HDLC SERPL-MCNC: 64 MG/DL
LDLC SERPL CALC-MCNC: 5 MG/DL
NONHDLC SERPL-MCNC: 43 MG/DL
POTASSIUM SERPL-SCNC: 3.9 MMOL/L (ref 3.4–5.3)
SODIUM SERPL-SCNC: 136 MMOL/L (ref 133–144)
TRIGL SERPL-MCNC: 188 MG/DL

## 2018-02-21 PROCEDURE — 36415 COLL VENOUS BLD VENIPUNCTURE: CPT | Performed by: FAMILY MEDICINE

## 2018-02-21 PROCEDURE — G0463 HOSPITAL OUTPT CLINIC VISIT: HCPCS | Mod: 25,ZF

## 2018-02-21 PROCEDURE — 80061 LIPID PANEL: CPT | Performed by: FAMILY MEDICINE

## 2018-02-21 PROCEDURE — 93005 ELECTROCARDIOGRAM TRACING: CPT | Mod: ZF

## 2018-02-21 PROCEDURE — 80048 BASIC METABOLIC PNL TOTAL CA: CPT | Performed by: FAMILY MEDICINE

## 2018-02-21 PROCEDURE — 93010 ELECTROCARDIOGRAM REPORT: CPT | Mod: ZP | Performed by: INTERNAL MEDICINE

## 2018-02-21 PROCEDURE — 99214 OFFICE O/P EST MOD 30 MIN: CPT | Mod: ZP | Performed by: INTERNAL MEDICINE

## 2018-02-21 RX ORDER — METOPROLOL SUCCINATE 100 MG/1
100 TABLET, EXTENDED RELEASE ORAL DAILY
Qty: 90 TABLET | Refills: 3 | Status: SHIPPED | OUTPATIENT
Start: 2018-02-21 | End: 2018-02-21

## 2018-02-21 RX ORDER — METOPROLOL SUCCINATE 25 MG/1
25 TABLET, EXTENDED RELEASE ORAL DAILY
Qty: 15 TABLET | Refills: 0 | Status: SHIPPED | OUTPATIENT
Start: 2018-02-21 | End: 2018-03-07

## 2018-02-21 RX ORDER — METOPROLOL SUCCINATE 100 MG/1
100 TABLET, EXTENDED RELEASE ORAL DAILY
Qty: 90 TABLET | Refills: 3 | Status: SHIPPED | OUTPATIENT
Start: 2018-02-21 | End: 2018-03-07

## 2018-02-21 ASSESSMENT — PAIN SCALES - GENERAL: PAINLEVEL: NO PAIN (0)

## 2018-02-21 NOTE — MR AVS SNAPSHOT
After Visit Summary   2/21/2018    Betty Tee    MRN: [id number]           Patient Information     Date Of Birth          1940        Visit Information        Provider Department      2/21/2018 1:00 PM Anderson Perez MD [facility]        Today's Diagnoses     Permanent atrial fibrillation (H)    -  1    Atrial fibrillation with controlled ventricular response (H)        (HFpEF) heart failure with preserved ejection fraction (H)        Persistent atrial fibrillation (H)          Care Instructions    Cardiology Provider you saw in clinic today: Dr. Perez    Medication Changes:     1. Increase Toprol XL to 125mg daily. If this doesn't significantly help, we can reduce you down to 100mg daily.     Follow-up Visit:  As needed          Please contact us via [employer] for questions or concerns.    Danay Payne RN   Electrophysiology Nurse Coordinator.  [phone]    If your question concerns the schedule/appointment times, contact:  [facility]   [phone]    Device Clinic (Pacemakers, ICD, Loop Recorders)   [phone]      You will receive all normal lab and testing results via [employer] or mail if not reviewed in clinic today.   If you need a medication refill please contact your pharmacy.    As always, thank you for trusting us with your health care needs!            Follow-ups after your visit        Your next 10 appointments already scheduled     Mar 07, 2018  4:30 PM CST   Lab with [facility] ([facility])    [address]  [facility] [address] [phone]            Mar 07, 2018  5:00 PM CST   (Arrive by 4:45 PM)   CORE RETURN with LIZY Tyelr [facility] ([facility])    [address] [facility]  [address] [place] [address] [phone]            Mar 08, 2018  3:00 PM CST   (Arrive by 2:45 PM)   Return Visit with Carmenza Oshea  MD Myke   [facility] ([facility])    [address] [facility]  [address]  [facility] [address] [phone]            Mar 28, 2018  1:00 PM CDT   Office Visit with Ilene Tristan MD   [facility] ([facility])    [address]  [facility] [id number] [phone]           Bring a current list of meds and any records pertaining to this visit. For Physicals, please bring immunization records and any forms needing to be filled out. Please arrive 10 minutes early to complete paperwork.            Apr 26, 2018  3:00 PM CDT   Lab with  LAB   [facility] ([facility])    [address] [place] [address]  1st Floor  [facility] [id number] [phone]            Apr 26, 2018  3:30 PM CDT   (Arrive by 3:15 PM)   RETURN HEART FAILURE with Radha Rosa MD   [facility] ([facility])    [address] [place] [address]  [address]  [facility] [id number] [phone]            Jul 18, 2018 12:30 PM CDT   FULL PULMONARY FUNCTION with  PFL C   [facility] Pulmonary Function Testing ([facility])    [address] [place] [address]  3rd Floor  [facility] [id number] [phone]            Jul 18, 2018  1:30 PM CDT   (Arrive by 1:15 PM)   Return Interstitial Lung with Meño Walsh MD   [facility] ([facility])    [address] [place] [address]  [address]  [facility] [id number] [phone]              Who to contact     If you have questions or need follow up information about today's clinic visit or your schedule please contact [facility] directly at [phone].  Normal or non-critical lab and imaging results will be communicated to you by MyChart, letter or phone within 4 business days after the clinic has received the results. If you do not hear from us within 7 days, please contact the clinic through  "[employer] or phone. If you have a critical or abnormal lab result, we will notify you by phone as soon as possible.  Submit refill requests through [employer] or call your pharmacy and they will forward the refill request to us. Please allow 3 business days for your refill to be completed.          Additional Information About Your Visit        [employer] Information     [employer] gives you secure access to your electronic health record. If you see a primary care provider, you can also send messages to your care team and make appointments. If you have questions, please call your primary care clinic.  If you do not have a primary care provider, please call [phone] and they will assist you.        Care EveryWhere ID     This is your Care EveryWhere ID. This could be used by other organizations to access your [facility] medical records  [id number]        Your Vitals Were     Pulse Height Pulse Oximetry BMI (Body Mass Index)          94 1.702 m (5' 7\") 96% 33.44 kg/m2         Blood Pressure from Last 3 Encounters:   02/21/18 110/72   02/16/18 104/65   01/31/18 105/68    Weight from Last 3 Encounters:   02/21/18 96.8 kg (213 lb 8 oz)   02/16/18 97 kg (213 lb 14.4 oz)   01/31/18 96.4 kg (212 lb 9.6 oz)              We Performed the Following     EKG 12-lead, tracing only (Same Day)          Today's Medication Changes          These changes are accurate as of 2/21/18  1:47 PM.  If you have any questions, ask your nurse or doctor.               These medicines have changed or have updated prescriptions.        Dose/Directions    acyclovir 5 % ointment   Commonly known as:  ZOVIRAX   This may have changed:  additional instructions   Used for:  Recurrent cold sores        Apply topically 6 times daily   Quantity:  15 g   Refills:  3       fluticasone 50 MCG/ACT spray   Commonly known as:  FLONASE   This may have changed:    - when to take this  - reasons to take this   Used for:  Seasonal allergic rhinitis        Dose:  1-2 spray "   Spray 1-2 sprays into both nostrils daily   Quantity:  16 g   Refills:  5       * metoprolol succinate 100 MG 24 hr tablet   Commonly known as:  TOPROL-XL   This may have changed:  You were already taking a medication with the same name, and this prescription was added. Make sure you understand how and when to take each.   Used for:  Atrial fibrillation with controlled ventricular response (H)   Changed by:  Anderson Perez MD        Dose:  100 mg   Take 1 tablet (100 mg) by mouth daily Take with 25mg of Toprol XL for total of 125mg daily.   Quantity:  90 tablet   Refills:  3       * metoprolol succinate 25 MG 24 hr tablet   Commonly known as:  TOPROL XL   This may have changed:    - how much to take  - how to take this  - when to take this  - additional instructions   Used for:  (HFpEF) heart failure with preserved ejection fraction (H), Persistent atrial fibrillation (H)   Changed by:  Anderson Perez MD        Dose:  25 mg   Take 1 tablet (25 mg) by mouth daily Take with 100mg Toprol XL for total of 125mg daily   Quantity:  15 tablet   Refills:  0       * Notice:  This list has 2 medication(s) that are the same as other medications prescribed for you. Read the directions carefully, and ask your doctor or other care provider to review them with you.         Where to get your medicines      These medications were sent to [facility]/pharmacy #[id number] - [address] [address]     Phone:  [phone]     metoprolol succinate 100 MG 24 hr tablet    metoprolol succinate 25 MG 24 hr tablet                Primary Care Provider Office Phone # Fax #    Ilene Tristan [phone] [address] [address]        Equal Access to Services     [facility] [place] : Gilda Trammell, rogelio perez, anderson esposito. [facility] [phone].    ATENCIÓN: Si  jewel covarrubias, tiene a conti disposición servicios gratuitos de asistencia lingüística. Noel carpenter [phone].    We comply with applicable federal civil rights laws and [place] laws. We do not discriminate on the basis of race, color, national origin, age, disability, sex, sexual orientation, or gender identity.            Thank you!     Thank you for choosing [facility]  for your care. Our goal is always to provide you with excellent care. Hearing back from our patients is one way we can continue to improve our services. Please take a few minutes to complete the written survey that you may receive in the mail after your visit with us. Thank you!             Your Updated Medication List - Protect others around you: Learn how to safely use, store and throw away your medicines at www.disposemymeds.org.          This list is accurate as of 2/21/18  1:47 PM.  Always use your most recent med list.                   Brand Name Dispense Instructions for use Diagnosis    acyclovir 400 MG tablet    ZOVIRAX     Take 400 mg by mouth See Admin Instructions 5 times daily as needed for outbreaks        acyclovir 5 % ointment    ZOVIRAX    15 g    Apply topically 6 times daily    Recurrent cold sores       albuterol 108 (90 BASE) MCG/ACT Inhaler    PROAIR HFA/PROVENTIL HFA/VENTOLIN HFA    1 Inhaler    Inhale 2 puffs into the lungs every 6 hours    ILD (interstitial lung disease) (H)       alendronate 70 MG tablet    FOSAMAX    4 tablet    Take 1 tablet (70 mg) by mouth every 7 days    Other osteoporosis without current pathological fracture       atorvastatin 20 MG tablet    LIPITOR    90 tablet    Take 1 tablet (20 mg) by mouth daily    Hyperlipidemia LDL goal <100       * blood glucose monitoring lancets     4 Box    Use to test blood sugar 4 times daily or as directed.    Type 2 diabetes mellitus without complication, without long-term current use of insulin (H)       * blood glucose monitoring lancets     1 Box    Use  to test blood sugar 4 times daily or as directed.  Ok to substitute alternative if insurance prefers.    Type 2 diabetes mellitus without complication, without long-term current use of insulin (H)       * blood glucose monitoring test strip    no brand specified    300 strip    Use to test blood sugars 4 times daily or as directed    Type 2 diabetes mellitus without complication, without long-term current use of insulin (H)       * blood glucose monitoring test strip    ACCU-CHEK SHANNON PLUS    120 strip    Use to test blood sugar 4 times daily or as directed.  Ok to substitute alternative if insurance prefers.    Type 2 diabetes mellitus without complication, without long-term current use of insulin (H)       COMPRESSION STOCKINGS     2 each    Wear compression stockings in affected leg (right leg) or both legs most of the time during the day and take them off at night.    DVT (deep venous thrombosis), right, Postphlebitic syndrome, Chronic anticoagulation, Atrial fibrillation (H)       escitalopram 10 MG tablet    LEXAPRO    90 tablet    TAKE 1 TABLET (10 MG) BY MOUTH DAILY    Major depressive disorder, recurrent episode, moderate (H)       fluticasone 220 MCG/ACT Inhaler    FLOVENT HFA    3 Inhaler    Inhale 2 puffs into the lungs 2 times daily    ILD (interstitial lung disease) (H), Follicular bronchiolitis (H)       fluticasone 50 MCG/ACT spray    FLONASE    16 g    Spray 1-2 sprays into both nostrils daily    Seasonal allergic rhinitis       furosemide 40 MG tablet    LASIX    180 tablet    Increase Lasix to 60 mg po in AM and 40 mg at HS    (HFpEF) heart failure with preserved ejection fraction (H)       * insulin pen needle 31G X 8 MM    B-D U/F    400 each    Use 4 times daily (with Lantus and Novolog) or as directed.    Type 2 diabetes mellitus without complication, without long-term current use of insulin (H)       * insulin pen needle 32G X 4 MM    BD JANE U/F    200 each    Use 4 daily as directed.     Type 2 diabetes mellitus without complication, without long-term current use of insulin (H)       ketoconazole 2 % cream    NIZORAL    60 g    Apply topically 2 times daily    Cutaneous candidiasis       LANTUS SOLOSTAR 100 UNIT/ML injection   Generic drug:  insulin glargine     15 mL    INJECT 25 UNTIS SUBCUTANEOUSLY EVERY DAY    Type 2 diabetes mellitus with hyperglycemia, with long-term current use of insulin (H)       lisinopril 2.5 MG tablet    PRINIVIL/Zestril    90 tablet    TAKE 1 TABLET (2.5 MG) BY MOUTH DAILY    Essential hypertension with goal blood pressure less than 140/90       metFORMIN 500 MG 24 hr tablet    GLUCOPHAGE-XR    180 tablet    TAKE 2 TABLETS BY MOUTH DAILY WITH DINNER    Type 2 diabetes mellitus without complication, without long-term current use of insulin (H)       * metoprolol succinate 100 MG 24 hr tablet    TOPROL-XL    90 tablet    Take 1 tablet (100 mg) by mouth daily Take with 25mg of Toprol XL for total of 125mg daily.    Atrial fibrillation with controlled ventricular response (H)       * metoprolol succinate 25 MG 24 hr tablet    TOPROL XL    15 tablet    Take 1 tablet (25 mg) by mouth daily Take with 100mg Toprol XL for total of 125mg daily    (HFpEF) heart failure with preserved ejection fraction (H), Persistent atrial fibrillation (H)       montelukast 10 MG tablet    SINGULAIR    90 tablet    TAKE 1 TABLET BY MOUTH AT BEDTIME    Sjogren's syndrome with lung involvement (H), ILD (interstitial lung disease) (H), Chronic seasonal allergic rhinitis due to other allergen       NovoLOG FLEXPEN 100 UNIT/ML injection   Generic drug:  insulin aspart     15 mL    INJECT 12 UNITS SUBCUTANEOUS 3 TIMES DAILY (WITH MEALS)    Type 2 diabetes mellitus with other specified complication (H)       * order for DME     1 Device    Oxygen: Patient requires supplemental Oxygen 4 LPM via nasal canula with activity. Please provide patient with a home unit and with portability capability. Oxygen  will be for a lifetime.    Hypoxia, ILD (interstitial lung disease) (H)       * order for DME     1 each    Equipment being ordered: Full face mask for CPAP - size: F10 large    ANGELICA (obstructive sleep apnea)       potassium chloride SA 20 MEQ CR tablet    K-DUR/KLOR-CON M    180 tablet    Increase KCL to 40 MEQ in AM and 20 MEQ in the evening.    (HFpEF) heart failure with preserved ejection fraction (H)       predniSONE 10 MG tablet    DELTASONE    90 tablet    Take 2 tablets for three days (4/12-14), then take 1 tablet daily (10 mg daily starting 4/15)    ILD (interstitial lung disease) (H), Sjogren's syndrome (H)       spironolactone 25 MG tablet    ALDACTONE    180 tablet    Take 2 tablets (50 mg) by mouth daily    Chronic diastolic congestive heart failure (H)       traZODone 50 MG tablet    DESYREL    180 tablet    TAKE 1/2-1 TABLET BY MOUTH NIGHTLY AS NEEDED, CAN TITRATE DOWN TO 1/2TAB OR UP TO 2TABS AS NEEDED    Insomnia due to medical condition       TYLENOL 500 MG tablet   Generic drug:  acetaminophen      Take 500 mg by mouth nightly as needed    Dizziness       warfarin 7.5 MG tablet    COUMADIN    90 tablet    TAKE 1 TABLET BY MOUTH DAILY. CURRENT DOSE IS 7.5 MG DAILY. DOSE ADJUSTED PER INR RESULT.    Atrial fibrillation with controlled ventricular response (H)       * Notice:  This list has 10 medication(s) that are the same as other medications prescribed for you. Read the directions carefully, and ask your doctor or other care provider to review them with you.

## 2018-02-21 NOTE — PATIENT INSTRUCTIONS
Cardiology Provider you saw in clinic today: Dr. Perez    Medication Changes:     1. Increase Toprol XL to 125mg daily. If this doesn't significantly help, please reduce down to 100mg daily.     Follow-up Visit:  As needed          Please contact us via [employer] for questions or concerns.    Danay Payne RN   Electrophysiology Nurse Coordinator.  [phone]    If your question concerns the schedule/appointment times, contact:  [facility]   [phone]    Device Clinic (Pacemakers, ICD, Loop Recorders)   [phone]      You will receive all normal lab and testing results via [employer] or mail if not reviewed in clinic today.   If you need a medication refill please contact your pharmacy.    As always, thank you for trusting us with your health care needs!

## 2018-02-21 NOTE — NURSING NOTE
Chief Complaint   Patient presents with     New Patient     Permanent AF, patient reporting symptoms. Review zio. EKG     Vitals were taken and medications were reconciled. EKG was performed.  CHARELS Perez  12:48 PM

## 2018-02-21 NOTE — PROGRESS NOTES
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 She presents today for follow up.   She was first diagnosed with A.fib in 10/2011 during an inpatient stay for diverticulitis which ultimately required partial colectomy, Her A.fib at that time presented w/ RVR and pulmonary edema. She was treated with IV metoprolol and diuretics, followied by diltiazem and ultimately discharged on warfarin. Aside from pulmonary edema, A.fib was not symptomatic. TTE 10/2011 showed LVEF=55-60%, diastolic dysfunction/high filling pressures with no other significant structural nidus for A.fib. She had paroxysmal A.fib subsequently after discharge from that admission. [place] event monitor 1/21/13-2/3/13 showed 1 episode of palpitations, correlated with 1 episode of A.fib lasting 4 hrs with VR up to 132. More recently, her A.fib has been present on all ECGs and has been felt to be persistent/permanent. She has been managed with a rate control strategy and anticoagulation with warfarin. Her prior therapies have included diltiazem CD (stopped earlier this year.) In 6/2016, she was admitted for hematochezia and found to have acute LGIB (Hgb 13->9.9.) No endoscopy was done at that time, with plan for outpatient endoscopy. Toprol XL (which had been stopped) was increased to 12.5 mg daily. Warfarin was discontinued at that time.  She was admitted again 7/2-7/4/16  with hypoxemia, which was felt to be related to HFpEF. She was noted in this setting to be in asymptomatic A.fib, XB=003w-640h. Toprol XL was increased to 50 mg daily. As she had shown no evidence of recurrent GI bleeding, warfarin was resumed on discharge. She was scheduled for EP follow up to address A.fib. Of note, she was also diagnosed with ANGELICA and started on CPAP at this time which she has  been using regularly. Since discharge, her HFpEF has been managed by her PCP with outpatient diuresis and daily weights. It was felt that her A.fib is permanent and that it is only symptomatic when she is in RVR; consequently a rate control strategy was favored. She was also noted to be significantly hypervolemic. Her Toprol XL was up-titrated to 75 mg BID, her diuretics were increased, and she was referred to [facility] for ongoing management of her HFpEF. A follow up 48h Holter showed excellent HR control with average VR~78.     She presents today for follow up. She reports feeling well. A zio patch from 1/29/18-2/8/18 showed 100% AF with average HR 79 bpm. Most recent echo from 10/2017 showed LVEF 60-65% with grade II diastolic dysfunction.She denies any chest pain/pressures, dizziness, lightheadedness, leg/ankle swelling, PND, orthopnea, palpitations, or syncopal symptoms. Presenting 12 lead ECG shows AF Vent Rate 85 bpm, QRS 78 ms, QTc 449 ms. Current cardiac medications include: Lasix, Toprol XL, Lisinopril, Lipitor, Spironolactone, and Warfarin.         PAST MEDICAL HISTORY:  Past Medical History:   Diagnosis Date     Alcohol abuse, in remission      Allergic rhinitis, cause unspecified     allegra helps when she takes it     Antiplatelet or antithrombotic long-term use      Atrial fibrillation (H)     in hosp in 11/11 after surgery w/ fluid overload     Cardiomegaly     LVH on stress echo- cardiac w/u at [facility] ER- neg CT scan for PE, neg stress echo in 8/06     Chest pain, unspecified      Disorder of bone and cartilage, unspecified     osteopenia (had been on prempro), improved on 6/06 dexa, stable dexa 11/10     Diverticulosis of colon (without mention of hemorrhage)     last episode yrs ago     Essential hypertension, benign      Gastro-oesophageal reflux disease      Insomnia, unspecified     weaned off clonazepam     Irregular heart beat      Lumbago 7/09    MRI with NEAL, now seeing Dr. Cain for  sciatic sx's     Major depressive disorder, recurrent episode, moderate (H)      Obstructive sleep apnea      Osteoarthrosis, unspecified whether generalized or localized, unspecified site      Sjogren's syndrome (H)      Sleep apnea      Tobacco use disorder     chantix in 9/07, started again in 6/08, working       CURRENT MEDICATIONS:  Current Outpatient Prescriptions   Medication Sig Dispense Refill     metoprolol succinate (TOPROL-XL) 100 MG 24 hr tablet Take 1 tablet (100 mg) by mouth daily Take with 25mg of Toprol XL for total of 125mg daily. 90 tablet 3     metoprolol succinate (TOPROL XL) 25 MG 24 hr tablet Take 1 tablet (25 mg) by mouth daily Take with 100mg Toprol XL for total of 125mg daily 15 tablet 0     furosemide (LASIX) 40 MG tablet Increase Lasix to 60 mg po in AM and 40 mg at  tablet 3     potassium chloride SA (K-DUR/KLOR-CON M) 20 MEQ CR tablet Increase KCL to 40 MEQ in AM and 20 MEQ in the evening. 180 tablet 3     escitalopram (LEXAPRO) 10 MG tablet TAKE 1 TABLET (10 MG) BY MOUTH DAILY 90 tablet 0     LANTUS SOLOSTAR 100 UNIT/ML soln INJECT 25 UNTIS SUBCUTANEOUSLY EVERY DAY 15 mL 0     NOVOLOG FLEXPEN 100 UNIT/ML soln INJECT 12 UNITS SUBCUTANEOUS 3 TIMES DAILY (WITH MEALS) 15 mL 0     montelukast (SINGULAIR) 10 MG tablet TAKE 1 TABLET BY MOUTH AT BEDTIME 90 tablet 1     lisinopril (PRINIVIL/ZESTRIL) 2.5 MG tablet TAKE 1 TABLET (2.5 MG) BY MOUTH DAILY 90 tablet 0     metFORMIN (GLUCOPHAGE-XR) 500 MG 24 hr tablet TAKE 2 TABLETS BY MOUTH DAILY WITH DINNER 180 tablet 0     warfarin (COUMADIN) 7.5 MG tablet TAKE 1 TABLET BY MOUTH DAILY. CURRENT DOSE IS 7.5 MG DAILY. DOSE ADJUSTED PER INR RESULT. 90 tablet 0     order for DME Equipment being ordered: Full face mask for CPAP - size: F10 large 1 each 0     traZODone (DESYREL) 50 MG tablet TAKE 1/2-1 TABLET BY MOUTH NIGHTLY AS NEEDED, CAN TITRATE DOWN TO 1/2TAB OR UP TO 2TABS AS NEEDED 180 tablet 0     atorvastatin (LIPITOR) 20 MG tablet Take 1  tablet (20 mg) by mouth daily 90 tablet 0     spironolactone (ALDACTONE) 25 MG tablet Take 2 tablets (50 mg) by mouth daily 180 tablet 3     blood glucose monitoring (NO BRAND SPECIFIED) test strip Use to test blood sugars 4 times daily or as directed 300 strip 3     blood glucose monitoring (ACCU-CHEK MULTICLIX) lancets Use to test blood sugar 4 times daily or as directed. 4 Box 3     insulin pen needle (B-D U/F) 31G X 8 MM Use 4 times daily (with Lantus and Novolog) or as directed. 400 each 3     insulin pen needle (BD JANE U/F) 32G X 4 MM Use 4 daily as directed. 200 each 11     blood glucose monitoring (ACCU-CHEK SHANNON PLUS) test strip Use to test blood sugar 4 times daily or as directed.  Ok to substitute alternative if insurance prefers. 120 strip 11     blood glucose monitoring (ACCU-CHEK FASTCLIX) lancets Use to test blood sugar 4 times daily or as directed.  Ok to substitute alternative if insurance prefers. 1 Box 11     predniSONE (DELTASONE) 10 MG tablet Take 2 tablets for three days (4/12-14), then take 1 tablet daily (10 mg daily starting 4/15) 90 tablet 3     ketoconazole (NIZORAL) 2 % cream Apply topically 2 times daily 60 g 1     albuterol (PROAIR HFA, PROVENTIL HFA, VENTOLIN HFA) 108 (90 BASE) MCG/ACT inhaler Inhale 2 puffs into the lungs every 6 hours 1 Inhaler 3     order for DME Oxygen: Patient requires supplemental Oxygen 4 LPM via nasal canula with activity. Please provide patient with a home unit and with portability capability. Oxygen will be for a lifetime. 1 Device 0     acyclovir (ZOVIRAX) 5 % ointment Apply topically 6 times daily (Patient taking differently: Apply topically 6 times daily As needed for outbreaks) 15 g 3     fluticasone (FLOVENT HFA) 220 MCG/ACT inhaler Inhale 2 puffs into the lungs 2 times daily 3 Inhaler 3     acyclovir (ZOVIRAX) 400 MG tablet Take 400 mg by mouth See Admin Instructions 5 times daily as needed for outbreaks       fluticasone (FLONASE) 50 MCG/ACT nasal  spray Spray 1-2 sprays into both nostrils daily (Patient taking differently: Spray 1-2 sprays into both nostrils daily as needed for allergies ) 16 g 5     COMPRESSION STOCKINGS Wear compression stockings in affected leg (right leg) or both legs most of the time during the day and take them off at night. 2 each 2     acetaminophen (TYLENOL) 500 MG tablet Take 500 mg by mouth nightly as needed        [DISCONTINUED] metoprolol succinate (TOPROL-XL) 100 MG 24 hr tablet Take 1 tablet (100 mg) by mouth daily 90 tablet 3     [DISCONTINUED] metoprolol succinate (TOPROL XL) 25 MG 24 hr tablet Take with 100 mg to complete 112.5 mg po daily. 15 tablet 0     [DISCONTINUED] metoprolol succinate (TOPROL-XL) 100 MG 24 hr tablet Take with 12.5 mg to complete 112.5 mg po daily. 90 tablet 3     [DISCONTINUED] LANTUS SOLOSTAR 100 UNIT/ML soln INJECT 25 UNTIS SUBCUTANEOUSLY EVERY DAY 15 mL 0     alendronate (FOSAMAX) 70 MG tablet Take 1 tablet (70 mg) by mouth every 7 days (Patient not taking: Reported on 1/31/2018) 4 tablet 11       PAST SURGICAL HISTORY:  Past Surgical History:   Procedure Laterality Date     BACK SURGERY  1962     BIOPSY BREAST  9/27/02    Biopsy Left Breast     C APPENDECTOMY  1970's?     C NONSPECIFIC PROCEDURE  11/05    exploratory abd lap, adhesions, resolved RLQ pain, diverticulitis episodes     CARDIAC SURGERY       CHOLECYSTECTOMY  1990's?     COLECTOMY LEFT  11/7/2011    Procedure:COLECTOMY LEFT; Laparoscopic mobilization of splenic flexture, sigmoid colectomy, coloprotoscopy, loop illeostomy; Surgeon:CK CASTANEDA; Location:[facility]     HYSTERECTOMY TOTAL ABDOMINAL, BILATERAL SALPINGO-OOPHORECTOMY, COMBINED  11/7/2011    Procedure:COMBINED HYSTERECTOMY TOTAL ABDOMINAL, BILATERAL SALPINGO-OOPHORECTOMY; total abdominal hysterectomy, bilateral salpingo-oophorectomy; Surgeon:ALETA MANUEL; Location:[facility]     INSERT STENT URETER  11/7/2011    Procedure:INSERT STENT URETER; Placement of Bilateral Ureteral Stents  ; Surgeon:PRANEETH BRYANT; Location:[facility]     SIGMOIDOSCOPY FLEXIBLE  11/3/2011    Procedure:SIGMOIDOSCOPY FLEXIBLE; Flexible Sigmoidoscopy; Surgeon:CK CASTANEDA; Location:[facility]     TAKEDOWN ILEOSTOMY  2/1/2012    Procedure:TAKEDOWN ILEOSTOMY; Takedown Loop Ileostomy ; Surgeon:CK CASTANEDA; Location:[facility]       ALLERGIES:     Allergies   Allergen Reactions     Augmentin Nausea and Vomiting     Codeine Nausea and Vomiting     Phenobarbital Itching       FAMILY HISTORY:  Family History   Problem Relation Age of Onset     C.A.D. Mother 63     MI- first at age 63     HEART DISEASE Mother      Hypertension Mother      CEREBROVASCULAR DISEASE Mother      Hyperlipidemia Mother      Alcohol/Drug Father      Alzheimer Disease Father      Dementia Father      Hypertension Father      Hyperlipidemia Father      C.A.D. Sister 52     Minor MI- age 50's     HEART DISEASE Sister      Hypertension Sister      Hypertension Sister      Hypertension Brother      Cancer - colorectal Sister 48     Late 40's early 50's     Prostate Cancer Brother 74     Dx'd age 74     GASTROINTESTINAL DISEASE Sister      Diverticulitis     GASTROINTESTINAL DISEASE Brother      Diverticulitis     Lipids Sister      Lipids Sister      Parkinsonism Brother      DIABETES Sister      HEART DISEASE Sister      CHF     CANCER Sister      lung, smoker     Substance Abuse Sister      Substance Abuse Brother      Asthma Sister      CANCER Sister      Breast Cancer Daughter      Prostate Cancer Brother      Hyperlipidemia Brother        SOCIAL HISTORY:  Social History   Substance Use Topics     Smoking status: Former Smoker     Packs/day: 0.50     Years: 10.00     Types: Cigarettes     Quit date: 8/1/2011     Smokeless tobacco: Never Used      Comment: 1/2 ppd     Alcohol use No      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       ROS:   A comprehensive 10 point review of systems negative other than as mentioned in HPI.  Exam:  /72 (BP Location:  "Left arm, Patient Position: Chair, Cuff Size: Adult Large)  Pulse 94  Ht 1.702 m (5' 7\")  Wt 96.8 kg (213 lb 8 oz)  SpO2 96%  BMI 33.44 kg/m2  GENERAL APPEARANCE: healthy, alert and no distress  HEENT: no icterus, no xanthelasmas, normal pupil size and reaction, normal palate, mucosa moist, no central cyanosis  NECK: no adenopathy, no asymmetry, masses, or scars, thyroid normal to palpation and no bruits, JVP~9 cmH2O.  RESPIRATORY: lungs clear to auscultation - no rales, rhonchi or wheezes, no use of accessory muscles, no retractions, respirations are unlabored, normal respiratory rate  CARDIOVASCULAR: irregularly irregular rhythm, normal S1 with physiologic split S2, no S3 or S4 and no murmur, click or rub, precordium quiet with normal PMI.  ABDOMEN: soft, non tender, without hepatosplenomegaly, no masses palpable, bowel sounds normal, aorta not enlarged by palpation, no abdominal bruits  EXTREMITIES: peripheral pulses normal, no edema, no bruits  NEURO: alert and oriented to person/place/time, normal speech, gait and affect  VASC: Radial, femoral, dorsalis pedis and posterior tibialis pulses are normal in volumes and symmetric bilaterally. No bruits are heard.  SKIN: no ecchymoses, no rashes    Labs:  Reviewed.     Testing/Procedures:  PULMONARY FUNCTION TESTS:   PFT Latest Ref Rng & Units 1/10/2018   FVC L 1.88   FEV1 L 1.51   FVC% % 68   FEV1% % 72       Labs 7/22:   Hgb 9.2. K 4.1 . ALT 51 AST 20.   ProBNP 566(<-2216 7/2)  INR 7/19: 2.5  STUDIES:  TTE 7/2016:  Atrial fibrillation  Left ventricular function, chamber size, wall motion, and wall thickness are normal.The EF is 60-65%.  Right ventricular function, chamber size, wall motion, and thickness are normal.  Mild biatrial enlargement is present.  Mild MR. Normal AV. Trace TR. Trace PI.  PA pressure cannot be determined  Estimated RA pressure 8 mmHg    Regadenoson SPECT 2014:  1. Normal myocardial SPECT study with a summed stress score of 2. A summed " stress score of less than 4 is associated with an annual event rate of 0.5% and 0.3% for myocardial infarction and cardiac death, respectively (Caro. Circulation 1998;98:535-43).  2. No significant perfusion abnormalities.  3. Hyperdynamic left ventricular systolic function as described above.  4. No prior study available for comparison.    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 She presents today for follow up.  A zio patch from 18-18 showed 100% AF with average HR 79 bpm. Most recent echo from 10/2017 showed LVEF 60-65% with grade II diastolic dysfunction. She reports feeling well.     1. Permanent Atrial fibrillation:  We discussed in detail with the patient management/treatment options for A.fib includin. Stroke Prophylaxis:  CHADSVASC=++age, +gender, +HTN  4, corresponding to a 4.0% annual stroke / systemic emolism event rate. indicating need for long term oral anticoagulation. She is appropriately on warfarin. No bleeding issues.    2. Rate Control: Well rate controlled on recent monitoring. Change Toprol  mg daily. .   3. Rhythm Control: She has permanent and asymptomatic AF well rate-controlled-- there are no indications for a rhythm control strategy.  4. Risk Factor Management: continue Statin, maintain tight BP control, and continued CPAP for ANGELICA.      2. HFpEF:  She has NYHA class II symptom also likely primarily driven by her ILD. She followed with Dr. Rosa and [facility].     She should follow up with Dr. Rosa/[facility]. She can follow up with EP on an as needed basis.      The patient states understanding and is agreeable with plan.   This patient was seen and evaluated with Dr. Perez. The above note reflects our joint assessment and plan.   Emilia Duenas, LIZY  CNP  Electrophysiology Consult Service  Pager: [phone]    EP STAFF NOTE  I have seen and examined the patient as part of a shared visit with ESE eZe NP.  I agree with the note above. I reviewed today's vital signs and medications. I have reviewed and discussed with the advanced practice provider their physical examination, assessment, and plan   Briefly, patient with chronic afib, well controled HR based on Ziopatch.  My key history/exam findings are: Afin.   The key management decisions made by me: patient has well controled afib. She is minimally symptomatic with some palpitations occasionally, however, when she complained of symptoms on Ziopatch, her symptoms corresponded most of the time to rate controled afib. Rhythm control is not indicated nor achievable at this time given chronicity. We could attempt stricter rate control, however we are limited by her BP. And airways, her average HR reflects good HR control. We will increase her toprol XL slowly by 25 mg at a time to see if it could help with symptoms, and cut back if side effects.    Anderson Perez MD [facility]  Cardiology - Electrophysiology

## 2018-02-21 NOTE — LETTER
2/21/2018      RE: Betty Tee  [address]  [facility] [address]       Dear Colleague,    Thank you for the opportunity to participate in the care of your patient, Betty Tee, at the [facility]. Please see a copy of my visit note below.    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 She presents today for follow up.   She was first diagnosed with A.fib in 10/2011 during an inpatient stay for diverticulitis which ultimately required partial colectomy, Her A.fib at that time presented w/ RVR and pulmonary edema. She was treated with IV metoprolol and diuretics, followied by diltiazem and ultimately discharged on warfarin. Aside from pulmonary edema, A.fib was not symptomatic. TTE 10/2011 showed LVEF=55-60%, diastolic dysfunction/high filling pressures with no other significant structural nidus for A.fib. She had paroxysmal A.fib subsequently after discharge from that admission. LifeWatch event monitor 1/21/13-2/3/13 showed 1 episode of palpitations, correlated with 1 episode of A.fib lasting 4 hrs with VR up to 132. More recently, her A.fib has been present on all ECGs and has been felt to be persistent/permanent. She has been managed with a rate control strategy and anticoagulation with warfarin. Her prior therapies have included diltiazem CD (stopped earlier this year.) In 6/2016, she was admitted for hematochezia and found to have acute LGIB (Hgb 13->9.9.) No endoscopy was done at that time, with plan for outpatient endoscopy. Toprol XL (which had been stopped) was increased to 12.5 mg daily. Warfarin was discontinued at that time.  She was admitted again 7/2-7/4/16  with hypoxemia, which was felt to be related to HFpEF. She was noted in  this setting to be in asymptomatic A.fib, QX=[id number]. Toprol XL was increased to 50 mg daily. As she had shown no evidence of recurrent GI bleeding, warfarin was resumed on discharge. She was scheduled for EP follow up to address A.fib. Of note, she was also diagnosed with ANGELICA and started on CPAP at this time which she has been using regularly. Since discharge, her HFpEF has been managed by her PCP with outpatient diuresis and daily weights. It was felt that her A.fib is permanent and that it is only symptomatic when she is in RVR; consequently a rate control strategy was favored. She was also noted to be significantly hypervolemic. Her Toprol XL was up-titrated to 75 mg BID, her diuretics were increased, and she was referred to [facility] for ongoing management of her HFpEF. A follow up 48h Holter showed excellent HR control with average VR~78.     She presents today for follow up. She reports feeling well. A zio patch from 1/29/18-2/8/18 showed 100% AF with average HR 79 bpm. Most recent echo from 10/2017 showed LVEF 60-65% with grade II diastolic dysfunction.She denies any chest pain/pressures, dizziness, lightheadedness, leg/ankle swelling, PND, orthopnea, palpitations, or syncopal symptoms. Presenting 12 lead ECG shows AF Vent Rate 85 bpm, QRS 78 ms, QTc 449 ms. Current cardiac medications include: Lasix, Toprol XL, Lisinopril, Lipitor, Spironolactone, and Warfarin.         PAST MEDICAL HISTORY:  Past Medical History:   Diagnosis Date     Alcohol abuse, in remission      Allergic rhinitis, cause unspecified     allegra helps when she takes it     Antiplatelet or antithrombotic long-term use      Atrial fibrillation (H)     in hosp in 11/11 after surgery w/ fluid overload     Cardiomegaly     LVH on stress echo- cardiac w/u at [facility] ER- neg CT scan for PE, neg stress echo in 8/06     Chest pain, unspecified      Disorder of bone and cartilage, unspecified     osteopenia (had been on prempro), improved on 6/06  dexa, stable dexa 11/10     Diverticulosis of colon (without mention of hemorrhage)     last episode yrs ago     Essential hypertension, benign      Gastro-oesophageal reflux disease      Insomnia, unspecified     weaned off clonazepam     Irregular heart beat      Lumbago 7/09    MRI with NEAL, now seeing Dr. Cain for sciatic sx's     Major depressive disorder, recurrent episode, moderate (H)      Obstructive sleep apnea      Osteoarthrosis, unspecified whether generalized or localized, unspecified site      Sjogren's syndrome (H)      Sleep apnea      Tobacco use disorder     chantix in 9/07, started again in 6/08, working       CURRENT MEDICATIONS:  Current Outpatient Prescriptions   Medication Sig Dispense Refill     metoprolol succinate (TOPROL-XL) 100 MG 24 hr tablet Take 1 tablet (100 mg) by mouth daily Take with 25mg of Toprol XL for total of 125mg daily. 90 tablet 3     metoprolol succinate (TOPROL XL) 25 MG 24 hr tablet Take 1 tablet (25 mg) by mouth daily Take with 100mg Toprol XL for total of 125mg daily 15 tablet 0     furosemide (LASIX) 40 MG tablet Increase Lasix to 60 mg po in AM and 40 mg at  tablet 3     potassium chloride SA (K-DUR/KLOR-CON M) 20 MEQ CR tablet Increase KCL to 40 MEQ in AM and 20 MEQ in the evening. 180 tablet 3     escitalopram (LEXAPRO) 10 MG tablet TAKE 1 TABLET (10 MG) BY MOUTH DAILY 90 tablet 0     LANTUS SOLOSTAR 100 UNIT/ML soln INJECT 25 UNTIS SUBCUTANEOUSLY EVERY DAY 15 mL 0     NOVOLOG FLEXPEN 100 UNIT/ML soln INJECT 12 UNITS SUBCUTANEOUS 3 TIMES DAILY (WITH MEALS) 15 mL 0     montelukast (SINGULAIR) 10 MG tablet TAKE 1 TABLET BY MOUTH AT BEDTIME 90 tablet 1     lisinopril (PRINIVIL/ZESTRIL) 2.5 MG tablet TAKE 1 TABLET (2.5 MG) BY MOUTH DAILY 90 tablet 0     metFORMIN (GLUCOPHAGE-XR) 500 MG 24 hr tablet TAKE 2 TABLETS BY MOUTH DAILY WITH DINNER 180 tablet 0     warfarin (COUMADIN) 7.5 MG tablet TAKE 1 TABLET BY MOUTH DAILY. CURRENT DOSE IS 7.5 MG DAILY. DOSE  ADJUSTED PER INR RESULT. 90 tablet 0     order for DME Equipment being ordered: Full face mask for CPAP - size: F10 large 1 each 0     traZODone (DESYREL) 50 MG tablet TAKE 1/2-1 TABLET BY MOUTH NIGHTLY AS NEEDED, CAN TITRATE DOWN TO 1/2TAB OR UP TO 2TABS AS NEEDED 180 tablet 0     atorvastatin (LIPITOR) 20 MG tablet Take 1 tablet (20 mg) by mouth daily 90 tablet 0     spironolactone (ALDACTONE) 25 MG tablet Take 2 tablets (50 mg) by mouth daily 180 tablet 3     blood glucose monitoring (NO BRAND SPECIFIED) test strip Use to test blood sugars 4 times daily or as directed 300 strip 3     blood glucose monitoring (ACCU-CHEK MULTICLIX) lancets Use to test blood sugar 4 times daily or as directed. 4 Box 3     insulin pen needle (B-D U/F) 31G X 8 MM Use 4 times daily (with Lantus and Novolog) or as directed. 400 each 3     insulin pen needle (BD JANE U/F) 32G X 4 MM Use 4 daily as directed. 200 each 11     blood glucose monitoring (ACCU-CHEK SHANNON PLUS) test strip Use to test blood sugar 4 times daily or as directed.  Ok to substitute alternative if insurance prefers. 120 strip 11     blood glucose monitoring (ACCU-CHEK FASTCLIX) lancets Use to test blood sugar 4 times daily or as directed.  Ok to substitute alternative if insurance prefers. 1 Box 11     predniSONE (DELTASONE) 10 MG tablet Take 2 tablets for three days (4/12-14), then take 1 tablet daily (10 mg daily starting 4/15) 90 tablet 3     ketoconazole (NIZORAL) 2 % cream Apply topically 2 times daily 60 g 1     albuterol (PROAIR HFA, PROVENTIL HFA, VENTOLIN HFA) 108 (90 BASE) MCG/ACT inhaler Inhale 2 puffs into the lungs every 6 hours 1 Inhaler 3     order for DME Oxygen: Patient requires supplemental Oxygen 4 LPM via nasal canula with activity. Please provide patient with a home unit and with portability capability. Oxygen will be for a lifetime. 1 Device 0     acyclovir (ZOVIRAX) 5 % ointment Apply topically 6 times daily (Patient taking differently: Apply  topically 6 times daily As needed for outbreaks) 15 g 3     fluticasone (FLOVENT HFA) 220 MCG/ACT inhaler Inhale 2 puffs into the lungs 2 times daily 3 Inhaler 3     acyclovir (ZOVIRAX) 400 MG tablet Take 400 mg by mouth See Admin Instructions 5 times daily as needed for outbreaks       fluticasone (FLONASE) 50 MCG/ACT nasal spray Spray 1-2 sprays into both nostrils daily (Patient taking differently: Spray 1-2 sprays into both nostrils daily as needed for allergies ) 16 g 5     COMPRESSION STOCKINGS Wear compression stockings in affected leg (right leg) or both legs most of the time during the day and take them off at night. 2 each 2     acetaminophen (TYLENOL) 500 MG tablet Take 500 mg by mouth nightly as needed        [DISCONTINUED] metoprolol succinate (TOPROL-XL) 100 MG 24 hr tablet Take 1 tablet (100 mg) by mouth daily 90 tablet 3     [DISCONTINUED] metoprolol succinate (TOPROL XL) 25 MG 24 hr tablet Take with 100 mg to complete 112.5 mg po daily. 15 tablet 0     [DISCONTINUED] metoprolol succinate (TOPROL-XL) 100 MG 24 hr tablet Take with 12.5 mg to complete 112.5 mg po daily. 90 tablet 3     [DISCONTINUED] LANTUS SOLOSTAR 100 UNIT/ML soln INJECT 25 UNTIS SUBCUTANEOUSLY EVERY DAY 15 mL 0     alendronate (FOSAMAX) 70 MG tablet Take 1 tablet (70 mg) by mouth every 7 days (Patient not taking: Reported on 1/31/2018) 4 tablet 11       PAST SURGICAL HISTORY:  Past Surgical History:   Procedure Laterality Date     BACK SURGERY  1962     BIOPSY BREAST  9/27/02    Biopsy Left Breast     C APPENDECTOMY  1970's?     C NONSPECIFIC PROCEDURE  11/05    exploratory abd lap, adhesions, resolved RLQ pain, diverticulitis episodes     CARDIAC SURGERY       CHOLECYSTECTOMY  1990's?     COLECTOMY LEFT  11/7/2011    Procedure:COLECTOMY LEFT; Laparoscopic mobilization of splenic flexture, sigmoid colectomy, coloprotoscopy, loop illeostomy; Surgeon:CK CASTANEDA; Location:[facility] OR     HYSTERECTOMY TOTAL ABDOMINAL, BILATERAL  SALPINGO-OOPHORECTOMY, COMBINED  11/7/2011    Procedure:COMBINED HYSTERECTOMY TOTAL ABDOMINAL, BILATERAL SALPINGO-OOPHORECTOMY; total abdominal hysterectomy, bilateral salpingo-oophorectomy; Surgeon:ALETA MANUEL; Location:[facility]     INSERT STENT URETER  11/7/2011    Procedure:INSERT STENT URETER; Placement of Bilateral Ureteral Stents ; Surgeon:PRANEETH BRYANT; Location:[facility] OR     SIGMOIDOSCOPY FLEXIBLE  11/3/2011    Procedure:SIGMOIDOSCOPY FLEXIBLE; Flexible Sigmoidoscopy; Surgeon:CK CASTANEDA; Location:[facility]     TAKEDOWN ILEOSTOMY  2/1/2012    Procedure:TAKEDOWN ILEOSTOMY; Takedown Loop Ileostomy ; Surgeon:CK CASTANEDA; Location:[facility]       ALLERGIES:     Allergies   Allergen Reactions     Augmentin Nausea and Vomiting     Codeine Nausea and Vomiting     Phenobarbital Itching       FAMILY HISTORY:  Family History   Problem Relation Age of Onset     C.A.D. Mother 63     MI- first at age 63     HEART DISEASE Mother      Hypertension Mother      CEREBROVASCULAR DISEASE Mother      Hyperlipidemia Mother      Alcohol/Drug Father      Alzheimer Disease Father      Dementia Father      Hypertension Father      Hyperlipidemia Father      C.A.D. Sister 52     Minor MI- age 50's     HEART DISEASE Sister      Hypertension Sister      Hypertension Sister      Hypertension Brother      Cancer - colorectal Sister 48     Late 40's early 50's     Prostate Cancer Brother 74     Dx'd age 74     GASTROINTESTINAL DISEASE Sister      Diverticulitis     GASTROINTESTINAL DISEASE Brother      Diverticulitis     Lipids Sister      Lipids Sister      Parkinsonism Brother      DIABETES Sister      HEART DISEASE Sister      CHF     CANCER Sister      lung, smoker     Substance Abuse Sister      Substance Abuse Brother      Asthma Sister      CANCER Sister      Breast Cancer Daughter      Prostate Cancer Brother      Hyperlipidemia Brother        SOCIAL HISTORY:  Social History   Substance Use Topics     Smoking status: Former  "Smoker     Packs/day: 0.50     Years: 10.00     Types: Cigarettes     Quit date: 8/1/2011     Smokeless tobacco: Never Used      Comment: 1/2 ppd     Alcohol use No      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       ROS:   A comprehensive 10 point review of systems negative other than as mentioned in HPI.  Exam:  /72 (BP Location: Left arm, Patient Position: Chair, Cuff Size: Adult Large)  Pulse 94  Ht 1.702 m (5' 7\")  Wt 96.8 kg (213 lb 8 oz)  SpO2 96%  BMI 33.44 kg/m2  GENERAL APPEARANCE: healthy, alert and no distress  HEENT: no icterus, no xanthelasmas, normal pupil size and reaction, normal palate, mucosa moist, no central cyanosis  NECK: no adenopathy, no asymmetry, masses, or scars, thyroid normal to palpation and no bruits, JVP~9 cmH2O.  RESPIRATORY: lungs clear to auscultation - no rales, rhonchi or wheezes, no use of accessory muscles, no retractions, respirations are unlabored, normal respiratory rate  CARDIOVASCULAR: irregularly irregular rhythm, normal S1 with physiologic split S2, no S3 or S4 and no murmur, click or rub, precordium quiet with normal PMI.  ABDOMEN: soft, non tender, without hepatosplenomegaly, no masses palpable, bowel sounds normal, aorta not enlarged by palpation, no abdominal bruits  EXTREMITIES: peripheral pulses normal, no edema, no bruits  NEURO: alert and oriented to person/place/time, normal speech, gait and affect  VASC: Radial, femoral, dorsalis pedis and posterior tibialis pulses are normal in volumes and symmetric bilaterally. No bruits are heard.  SKIN: no ecchymoses, no rashes    Labs:  Reviewed.     Testing/Procedures:  PULMONARY FUNCTION TESTS:   PFT Latest Ref Rng & Units 1/10/2018   FVC L 1.88   FEV1 L 1.51   FVC% % 68   FEV1% % 72       Labs 7/22:   Hgb 9.2. K 4.1 . ALT 51 AST 20.   ProBNP 566(<-2216 7/2)  INR 7/19: 2.5  STUDIES:  TTE 7/2016:  Atrial fibrillation  Left ventricular function, chamber size, wall motion, and wall thickness are normal.The EF is " 60-65%.  Right ventricular function, chamber size, wall motion, and thickness are normal.  Mild biatrial enlargement is present.  Mild MR. Normal AV. Trace TR. Trace PI.  PA pressure cannot be determined  Estimated RA pressure 8 mmHg    Regadenoson SPECT 2014:  1. Normal myocardial SPECT study with a summed stress score of 2. A summed stress score of less than 4 is associated with an annual event rate of 0.5% and 0.3% for myocardial infarction and cardiac death, respectively (Caro. Circulation 1998;98:535-43).  2. No significant perfusion abnormalities.  3. Hyperdynamic left ventricular systolic function as described above.  4. No prior study available for comparison.    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 She presents today for follow up.  A zio patch from 18-18 showed 100% AF with average HR 79 bpm. Most recent echo from 10/2017 showed LVEF 60-65% with grade II diastolic dysfunction. She reports feeling well.     1. Permanent Atrial fibrillation:  We discussed in detail with the patient management/treatment options for Mitzy includin. Stroke Prophylaxis:  CHADSVASC=++age, +gender, +HTN  4, corresponding to a 4.0% annual stroke / systemic emolism event rate. indicating need for long term oral anticoagulation. She is appropriately on warfarin. No bleeding issues.    2. Rate Control: Well rate controlled on recent monitoring. Change Toprol  mg daily. .   3. Rhythm Control: She has permanent and asymptomatic AF well rate-controlled-- there are no indications for a rhythm control strategy.  4. Risk Factor Management: continue Statin, maintain tight BP control, and continued CPAP for ANGELICA.      2. HFpEF:  She has NYHA class II symptom also likely primarily driven by her ILD. She followed with Dr. Rosa and  [facility].     She should follow up with Dr. Rosa/[facility]. She can follow up with EP on an as needed basis.      The patient states understanding and is agreeable with plan.   This patient was seen and evaluated with Dr. Perez. The above note reflects our joint assessment and plan.   LIZY Zee CNP  Electrophysiology Consult Service  Pager: [phone]    EP STAFF NOTE  I have seen and examined the patient as part of a shared visit with ESE Zee NP.  I agree with the note above. I reviewed today's vital signs and medications. I have reviewed and discussed with the advanced practice provider their physical examination, assessment, and plan   Briefly, patient with chronic afib, well controled HR based on Ziopatch.  My key history/exam findings are: Afin.   The key management decisions made by me: patient has well controled afib. She is minimally symptomatic with some palpitations occasionally, however, when she complained of symptoms on Ziopatch, her symptoms corresponded most of the time to rate controled afib. Rhythm control is not indicated nor achievable at this time given chronicity. We could attempt stricter rate control, however we are limited by her BP. And airways, her average HR reflects good HR control. We will increase her toprol XL slowly by 25 mg at a time to see if it could help with symptoms, and cut back if side effects.    Anderson Perez MD [facility]  Cardiology - Electrophysiology

## 2018-02-22 LAB — INTERPRETATION ECG - MUSE: NORMAL

## 2018-02-22 NOTE — TELEPHONE ENCOUNTER
Should be fine- will send basaglar with next request.  Sabrina, is it a 1:1 conversion of lantus to basaglar for future reference?  Thanks!  CW

## 2018-02-22 NOTE — TELEPHONE ENCOUNTER
"Yes - 1:1 - easiest to think of Basaglar as a \"generic Lantus\"    Sabrina Meek, PharmD, JAMES, BCACP  [facility] Pharmacist, [facility]   "

## 2018-02-23 ENCOUNTER — CARE COORDINATION (OUTPATIENT)
Dept: CARDIOLOGY | Facility: CLINIC | Age: 78
End: 2018-02-23

## 2018-02-23 NOTE — PROGRESS NOTES
Called Sister Sita to follow up and see how she's feeling after increase in Lasix and Metoprolol. No answer, will f/up again Monday.

## 2018-02-26 ENCOUNTER — CARE COORDINATION (OUTPATIENT)
Dept: CARDIOLOGY | Facility: CLINIC | Age: 78
End: 2018-02-26

## 2018-02-26 NOTE — PROGRESS NOTES
Called Sister Randal back to clarify a few things. Not using any more pillows then normal at night but is having to prop herself up with her cpap in use. She overall does not feel any better on increased dose of Lasix. When her breathing is harder, sometimes it's because she takes her oxygen off because she doesn't want to wear it in the stores walking around, so she knows that's her own fault. Encouraged to wear it, also to call us if breathing gets any worse or if weight increases per parameters.   Beatriz Blanco RN

## 2018-02-26 NOTE — PROGRESS NOTES
Called Sister Randal to see how she's been feeling since increasing Metoprolol and Lasix. Per Sister she actually will start the increased Metoprolol dose tomorrow, as her pill container had already been filled for 2 weeks at the time of her visit when we increased it. She has been taking the increased Lasix dose however, and weight has been anywhere between 209-212 since increasing. Weight was 213 in clinic on 2/14. She states her breathing is hard at times, and has to increase her O2 requirements when she goes out and about. Denies edema, any other symptoms.   Told her I would relay this information to Kiesha.   Informed to please call us if she has weight gain of 2 lbs in a day or 5 lbs in a week, any increased swelling, or shortness of breath.   Beatriz Blanco RN

## 2018-02-27 ENCOUNTER — PRE VISIT (OUTPATIENT)
Dept: CARDIOLOGY | Facility: CLINIC | Age: 78
End: 2018-02-27

## 2018-02-27 DIAGNOSIS — I50.30 (HFPEF) HEART FAILURE WITH PRESERVED EJECTION FRACTION (H): Primary | ICD-10-CM

## 2018-02-28 NOTE — PROGRESS NOTES
Called Sister Sita to follow up to see how feeling and if she wants to be seen in clinic this week instead. She reports its a better day today and her breathing is feeling a little better. Weight is steady at 211 lbs. She doesn't want to come in this week and will follow up with Kiesha next week as scheduled.   Informed to please call us if she has weight gain of 2 lbs in a day or 5 lbs in a week, any increased swelling, or shortness of breath.   Beatriz Blanco RN

## 2018-03-04 DIAGNOSIS — E11.69 TYPE 2 DIABETES MELLITUS WITH OTHER SPECIFIED COMPLICATION (H): ICD-10-CM

## 2018-03-05 RX ORDER — INSULIN ASPART 100 [IU]/ML
INJECTION, SOLUTION INTRAVENOUS; SUBCUTANEOUS
Qty: 15 ML | Refills: 0 | Status: SHIPPED | OUTPATIENT
Start: 2018-03-05 | End: 2018-04-16

## 2018-03-05 NOTE — TELEPHONE ENCOUNTER
"Prescription approved per [facility] Refill Protocol.  Lydia HALEY RN    Requested Prescriptions   Pending Prescriptions Disp Refills     NOVOLOG FLEXPEN 100 UNIT/ML soln [Pharmacy Med Name: NOVOLOG 100 UNITS/ML FLEXPEN]  0     Sig: INJECT 12 UNITS SUBCUTANEOUS 3 TIMES DAILY (WITH MEALS)    Short Acting Insulin Protocol Passed    3/4/2018  1:13 AM       Passed - Blood pressure less than 140/90 in past 6 months    BP Readings from Last 3 Encounters:   02/21/18 110/72   02/16/18 104/65   01/31/18 105/68                Passed - LDL on file in past 12 months    Recent Labs   Lab Test  02/21/18   1230   LDL  5            Passed - Microalbumin on file in past 12 months    Recent Labs   Lab Test  10/06/17   1422   MICROL  14   UMALCR  29.65*            Passed - Serum creatinine on file in past 12 months    Recent Labs   Lab Test  02/21/18   1230   CR  0.88            Passed - HgbA1C in past 3 or 6 months    Recent Labs   Lab Test  01/31/18   1428   A1C  7.0*            Passed - Patient is age 18 or older       Passed - Recent (6 mo) or future (30 days) visit within the authorizing provider's specialty    Patient had office visit in the last 6 months or has a visit in the next 30 days with authorizing provider.  See \"Patient Info\" tab in inbasket, or \"Choose Columns\" in Meds & Orders section of the refill encounter.            Next 5 appointments (look out 90 days)     Mar 28, 2018  1:00 PM CDT   Office Visit with Ielne Tristan MD   [facility]    [address] [facility] [facility] [address] [phone]                  "

## 2018-03-06 NOTE — PROGRESS NOTES
HPI:   Ms. 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  She is using her oxygen for long distances at [facility]. She presents to [facility] for routine follow up.     She complains of worsening KIMBLE, but is still only needing oxygen for long distances. She complains of abdominal distention and LE edema. She also complains of lightheadedness and dizziness with denies fever, chills, lightheadedness, dizziness, chest pain, palpitations, PND, orthopnea, nausea, vomiting, diarrhea, hematochezia, melena, or LE edema. Her weight continues to increase slightly at 211-213 lbs. She continues to follow a low sodium diet.      PAST MEDICAL HISTORY:  Past Medical History:   Diagnosis Date     Alcohol abuse, in remission      Allergic rhinitis, cause unspecified     allegra helps when she takes it     Antiplatelet or antithrombotic long-term use      Atrial fibrillation (H)     in hosp in 11/11 after surgery w/ fluid overload     Cardiomegaly     LVH on stress echo- cardiac w/u at [facility] ER- neg CT scan for PE, neg stress echo in 8/06     Chest pain, unspecified      Disorder of bone and cartilage, unspecified     osteopenia (had been on prempro), improved on 6/06 dexa, stable dexa 11/10     Diverticulosis of colon (without mention of hemorrhage)     last episode yrs ago     Essential hypertension, benign      Gastro-oesophageal reflux disease      Insomnia, unspecified     weaned off clonazepam     Irregular heart beat      Lumbago 7/09    MRI with NEAL, now seeing Dr. Cain for sciatic sx's     Major depressive disorder, recurrent episode, moderate (H)      Obstructive sleep apnea      Osteoarthrosis, unspecified whether generalized or localized, unspecified site      Sjogren's  syndrome (H)      Sleep apnea      Tobacco use disorder     chantix in 9/07, started again in 6/08, working       FAMILY HISTORY:  Family History   Problem Relation Age of Onset     C.A.D. Mother 63     MI- first at age 63     HEART DISEASE Mother      Hypertension Mother      CEREBROVASCULAR DISEASE Mother      Hyperlipidemia Mother      Alcohol/Drug Father      Alzheimer Disease Father      Dementia Father      Hypertension Father      Hyperlipidemia Father      C.A.D. Sister 52     Minor MI- age 50's     HEART DISEASE Sister      Hypertension Sister      Hypertension Sister      Hypertension Brother      Cancer - colorectal Sister 48     Late 40's early 50's     Prostate Cancer Brother 74     Dx'd age 74     GASTROINTESTINAL DISEASE Sister      Diverticulitis     GASTROINTESTINAL DISEASE Brother      Diverticulitis     Lipids Sister      Lipids Sister      Parkinsonism Brother      DIABETES Sister      HEART DISEASE Sister      CHF     CANCER Sister      lung, smoker     Substance Abuse Sister      Substance Abuse Brother      Asthma Sister      CANCER Sister      Breast Cancer Daughter      Prostate Cancer Brother      Hyperlipidemia Brother        SOCIAL HISTORY:  Social History     Social History     Marital status: Single     Spouse name: N/A     Number of children: 0     Years of education: Ed Spec De     Occupational History     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     Social History Main Topics     Smoking status: Former Smoker     Packs/day: 0.50     Years: 10.00     Types: Cigarettes     Quit date: 8/1/2011     Smokeless tobacco: Never Used      Comment: 1/2 ppd     Alcohol use No      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     Drug use: No     Sexual activity: No     Other Topics Concern     Not on file     Social History Narrative    Social Documentation:        Balanced Diet: YES    Calcium intake: 1-2 per day    Caffeine: 4-5 cups per day    Exercise:   type of activity limited right now due to foot injury    Sunscreen: No    Seatbelts:  Yes    Self Breast Exam:  Yes    Self Testicular Exam: n/a    Physical/Emotional/Sexual Abuse: No    Do you feel safe in your environment? No-pt lives in [facility]        Cholesterol screen up to date: No-will check today    Eye Exam up to date: Yes    Dental Exam up to date: Yes    Pap smear up to date: Does Not Apply    Mammogram up to date: Yes-10/07    Dexa Scan up to date: Yes-2006    Colonoscopy up to date: Yes-2006    Immunizations up to date: Yes-td 2002    Glucose screen if over 40:  Yes    '09                       CURRENT MEDICATIONS:  Outpatient Medications Prior to Visit   Medication Sig Dispense Refill     NOVOLOG FLEXPEN 100 UNIT/ML soln INJECT 12 UNITS SUBCUTANEOUS 3 TIMES DAILY (WITH MEALS) 15 mL 0     potassium chloride SA (K-DUR/KLOR-CON M) 20 MEQ CR tablet Increase KCL to 40 MEQ in AM and 20 MEQ in the evening. 180 tablet 3     escitalopram (LEXAPRO) 10 MG tablet TAKE 1 TABLET (10 MG) BY MOUTH DAILY 90 tablet 0     LANTUS SOLOSTAR 100 UNIT/ML soln INJECT 25 UNTIS SUBCUTANEOUSLY EVERY DAY 15 mL 0     montelukast (SINGULAIR) 10 MG tablet TAKE 1 TABLET BY MOUTH AT BEDTIME 90 tablet 1     lisinopril (PRINIVIL/ZESTRIL) 2.5 MG tablet TAKE 1 TABLET (2.5 MG) BY MOUTH DAILY 90 tablet 0     metFORMIN (GLUCOPHAGE-XR) 500 MG 24 hr tablet TAKE 2 TABLETS BY MOUTH DAILY WITH DINNER 180 tablet 0     warfarin (COUMADIN) 7.5 MG tablet TAKE 1 TABLET BY MOUTH DAILY. CURRENT DOSE IS 7.5 MG DAILY. DOSE ADJUSTED PER INR RESULT. 90 tablet 0     order for DME Equipment being ordered: Full face mask for CPAP - size: F10 large 1 each 0     traZODone (DESYREL) 50 MG tablet TAKE 1/2-1 TABLET BY MOUTH NIGHTLY AS NEEDED, CAN TITRATE DOWN TO 1/2TAB OR UP TO 2TABS AS NEEDED 180 tablet 0     atorvastatin (LIPITOR) 20 MG tablet Take 1 tablet (20 mg) by mouth daily 90 tablet 0     spironolactone (ALDACTONE) 25 MG tablet Take 2 tablets (50 mg) by  mouth daily 180 tablet 3     blood glucose monitoring (NO BRAND SPECIFIED) test strip Use to test blood sugars 4 times daily or as directed 300 strip 3     blood glucose monitoring (ACCU-CHEK MULTICLIX) lancets Use to test blood sugar 4 times daily or as directed. 4 Box 3     insulin pen needle (B-D U/F) 31G X 8 MM Use 4 times daily (with Lantus and Novolog) or as directed. 400 each 3     insulin pen needle (BD JANE U/F) 32G X 4 MM Use 4 daily as directed. 200 each 11     blood glucose monitoring (ACCU-CHEK SHANNON PLUS) test strip Use to test blood sugar 4 times daily or as directed.  Ok to substitute alternative if insurance prefers. 120 strip 11     blood glucose monitoring (ACCU-CHEK FASTCLIX) lancets Use to test blood sugar 4 times daily or as directed.  Ok to substitute alternative if insurance prefers. 1 Box 11     predniSONE (DELTASONE) 10 MG tablet Take 2 tablets for three days (4/12-14), then take 1 tablet daily (10 mg daily starting 4/15) 90 tablet 3     ketoconazole (NIZORAL) 2 % cream Apply topically 2 times daily 60 g 1     albuterol (PROAIR HFA, PROVENTIL HFA, VENTOLIN HFA) 108 (90 BASE) MCG/ACT inhaler Inhale 2 puffs into the lungs every 6 hours 1 Inhaler 3     order for DME Oxygen: Patient requires supplemental Oxygen 4 LPM via nasal canula with activity. Please provide patient with a home unit and with portability capability. Oxygen will be for a lifetime. 1 Device 0     acyclovir (ZOVIRAX) 5 % ointment Apply topically 6 times daily (Patient taking differently: Apply topically 6 times daily As needed for outbreaks) 15 g 3     fluticasone (FLOVENT HFA) 220 MCG/ACT inhaler Inhale 2 puffs into the lungs 2 times daily 3 Inhaler 3     acyclovir (ZOVIRAX) 400 MG tablet Take 400 mg by mouth See Admin Instructions 5 times daily as needed for outbreaks       fluticasone (FLONASE) 50 MCG/ACT nasal spray Spray 1-2 sprays into both nostrils daily (Patient taking differently: Spray 1-2 sprays into both nostrils  "daily as needed for allergies ) 16 g 5     COMPRESSION STOCKINGS Wear compression stockings in affected leg (right leg) or both legs most of the time during the day and take them off at night. 2 each 2     acetaminophen (TYLENOL) 500 MG tablet Take 500 mg by mouth nightly as needed        metoprolol succinate (TOPROL-XL) 100 MG 24 hr tablet Take 1 tablet (100 mg) by mouth daily Take with 25mg of Toprol XL for total of 125mg daily. 90 tablet 3     metoprolol succinate (TOPROL XL) 25 MG 24 hr tablet Take 1 tablet (25 mg) by mouth daily Take with 100mg Toprol XL for total of 125mg daily 15 tablet 0     furosemide (LASIX) 40 MG tablet Increase Lasix to 60 mg po in AM and 40 mg at  tablet 3     alendronate (FOSAMAX) 70 MG tablet Take 1 tablet (70 mg) by mouth every 7 days (Patient not taking: Reported on 1/31/2018) 4 tablet 11     No facility-administered medications prior to visit.        ROS:   CONSTITUTIONAL: Denies fever, chills, fatigue. She complains of weight gain.   HEENT: Denies headache, vision changes, and changes in speech.   CV: Refer to HPI.   PULMONARY:Denies shortness of breath, cough, or previous TB exposure.   GI: Denies nausea, vomiting, diarrhea, and abdominal pain. Bowel movements are regular. Complains of abdominal distention.   : Denies urinary alterations, dysuria, urinary frequency, hematuria, and abnormal drainage.   EXT: Complains of LE edema, around calves.   SKIN: Denies abnormal rashes or lesions.   MUSCULOSKELETAL: Denies upper or lower extremity weakness and pain.   NEUROLOGIC: Denies lightheadedness, dizziness, seizures, or upper or lower extremity paresthesia.     EXAM:  /75 (BP Location: Right arm, Patient Position: Chair, Cuff Size: Adult Large)  Pulse 82  Ht 1.702 m (5' 7\")  Wt 97.8 kg (215 lb 8 oz)  SpO2 96%  BMI 33.75 kg/m2  GENERAL: Appears alert and oriented times three.   HEENT: Eye symmetrical and free of discharge bilaterally. Mucous membranes moist and " without lesions.  NECK: Supple and without lymphadenopathy. JVD to tragus.   CV: Irregular, controlled. S1S2 present without murmur, rub, or gallop.   RESPIRATORY: Respirations regular, even, and unlabored. Lungs CTA throughout.   GI: Soft and distended with normoactive bowel sounds present in all quadrants. No tenderness, rebound, guarding. No organomegaly.   EXTREMITIES: Trace bilateral LE edema. 2+ bilateral pedal pulses.   NEUROLOGIC: Alert and orientated x 3. CN II-XII grossly intact. No focal deficits.   MUSCULOSKELETAL: No joint swelling or tenderness.   SKIN: No jaundice. No rashes or lesions.     Labs:  CBC RESULTS:  Lab Results   Component Value Date    WBC 15.6 (H) 11/08/2017    RBC 4.59 11/08/2017    HGB 13.4 11/08/2017    HCT 42.1 11/08/2017    MCV 92 11/08/2017    MCH 29.2 11/08/2017    MCHC 31.8 11/08/2017    RDW 16.7 (H) 11/08/2017     11/08/2017       CMP RESULTS:  Lab Results   Component Value Date     03/07/2018    POTASSIUM 4.2 03/07/2018    CHLORIDE 97 03/07/2018    CO2 27 03/07/2018    ANIONGAP 9 03/07/2018     (H) 03/07/2018    BUN 29 03/07/2018    CR 1.04 03/07/2018    GFRESTIMATED 51 (L) 03/07/2018    GFRESTBLACK 62 03/07/2018    CLAUDY 9.3 03/07/2018    BILITOTAL 0.6 10/06/2017    ALBUMIN 3.1 (L) 10/06/2017    ALKPHOS 121 10/06/2017    ALT 43 10/06/2017    AST 37 10/06/2017        INR RESULTS:  Lab Results   Component Value Date    INR 1.6 (A) 01/31/2018    INR 2.78 (H) 12/19/2017       Lab Results   Component Value Date    MAG 2.0 04/11/2017     Lab Results   Component Value Date    NTBNPI 3531 (H) 04/06/2017     Lab Results   Component Value Date    NTBNP 460 (H) 01/31/2018       Diagnostics:  Recent Results (from the past 4320 hour(s))   Echocardiogram Complete    Narrative    [id number]  ECH19  [id number]  [id number]^MELINDA^LOS^           [facility]  Diagnostic and Treamtent-3rd Floor  [address] [place] [address]      Name: JEAN KENNEDY  MRN: [id number]  : 1940  Study Date: 10/17/2017 10:42 AM  Age: 77 yrs  Gender: Female  [place] [facility] [place]  Reason For Study: , Dyspnea, unspecified  Ordering Physician: LOS LAWRENCE  Referring Physician: LOS LAWRENEC  Performed By: Katerine Silva RDCS     BSA: 2.0 m2  Height: 66 in  Weight: 206 lb  HR: 95  BP: 125/82 mmHg  _____________________________________________________________________________  __        Procedure  Echocardiogram with two-dimensional, color and spectral Doppler performed.  Technically difficult study. Poor acoustic windows.  _____________________________________________________________________________  __        Interpretation Summary  Technically difficult study. Poor acoustic windows.  Global and regional left ventricular function is normal with an EF of 60-65%.  Mild right ventricular dilation is present. Global right ventricular function  is normal.  Pulmonary artery systolic pressure is normal.  The inferior vena cava is normal. Estimated mean right atrial pressure is 3  mmHg.  No pericardial effusion is present.  _____________________________________________________________________________  __        Left Ventricle  Global and regional left ventricular function is normal with an EF of 60-65%.  Left ventricular size is normal. Borderline concentric wall thickening  consistent with left ventricular hypertrophy is present. Grade II or moderate  diastolic dysfunction. Regional wall motion cannot be assessed.     Right Ventricle  Global right ventricular function is normal. Mild right ventricular dilation  is present.     Atria  Mild biatrial enlargement is present. The atrial septum is intact as assessed  by color Doppler .     Mitral Valve  The mitral valve is normal. Mild mitral insufficiency is present.        Aortic Valve  Aortic valve is normal in structure and function.     Tricuspid Valve  The tricuspid valve is normal. Mild tricuspid  insufficiency is present.  Pulmonary artery systolic pressure is normal. The right ventricular systolic  pressure is approximated at 23.6 mmHg plus the right atrial pressure.     Pulmonic Valve  Mild pulmonic insufficiency is present.     Vessels  The aorta root is normal. The inferior vena cava is normal. Estimated mean  right atrial pressure is 3 mmHg.     Pericardium  No pericardial effusion is present. Prominent epicardial fat is noted.        Compared to Previous Study  Comparison with prior studies is difficult due to poor image quality.  _____________________________________________________________________________  __  MMode/2D Measurements & Calculations  LA Volume (BP): 85.2 ml     LA Volume Index (BP): 42.0 ml/m2        Doppler Measurements & Calculations  MV E max gerald: 110.5 cm/sec  MV A max gerald: 28.4 cm/sec  MV E/A: 3.9  MV dec time: 0.16 sec  Ao V2 max: 113.0 cm/sec  Ao max P.0 mmHg  LV V1 max P.6 mmHg  LV V1 max: 106.7 cm/sec  LV V1 VTI: 22.2 cm  PA V2 max: 98.0 cm/sec  PA max PG: 3.8 mmHg  PI end-d gerald: 128.8 cm/sec  PI max gerald: 208.7 cm/sec  TR max gerald: 243.0 cm/sec  TR max P.6 mmHg     Pulm Sys Gerald: 17.4 cm/sec  Pulm Shay Gerald: 72.8 cm/sec  Pulm S/D: 0.24  Lateral E/e': 15.9  Med E to E': 13.9  Medial E/e': 13.9     _____________________________________________________________________________  __           Report approved by: Jd Daugherty 10/17/2017 01:53 PM          Assessment and Plan:   Ms. 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 She presents to [facility] for routine follow up and remains hypervolemic with worsening KIMBLE.       Heart failure with preserved ejection fraction.   ACC Functional Class III  Rate control: HR-82.  Change Toprol XL to 62.6 mg po BID to see if this improves lightheadedness as it appears positional. Notified patient if any worsening palpitations she will notify us immediately.   volume status: Hypervolemic. D/C Lasix. Change to Torsemide 20 mg po BID. Continue current KCL.   Blood pressure control: BP stable. Lisinopril and Toprol XL.   Aldosterone antagonist: Aldactone 50 mg po daily   Evaluation of coronary arteries: Lexiscan negative for ischemia 6/14  Sleep apnea screening: ANGELICA on CPAP      HTN.   - BP controlled on current regimen.       Afib. Holter notes persistent afib 100% of the time with rates up to 140's at rest. CHADSVASC-5.    - Change Toprol XL to 62.5 mg po BID to see if this improves lightheadedness.   - Coumadin per Coumadin clinic.   - Appreciate EP consult.       ILD and Follicular Bronchiolitis  - Management per Pulmonary.       GERD with PUD. History of GI bleed secondary to diverticular bleed 6/28/16 resolved with oral Vitamin K.    - Protonix to 40 mg po BID.       Sjogren's Disease.   - Management per Rheumatology.     Follow up BMP in 1 week. Follow up in [facility] in 1 month.       Kiesha Elias  3/6/2018          CC  JOSE LUIS MAURICE,

## 2018-03-07 ENCOUNTER — OFFICE VISIT (OUTPATIENT)
Dept: CARDIOLOGY | Facility: CLINIC | Age: 78
End: 2018-03-07
Attending: INTERNAL MEDICINE
Payer: MEDICARE

## 2018-03-07 VITALS
DIASTOLIC BLOOD PRESSURE: 75 MMHG | SYSTOLIC BLOOD PRESSURE: 111 MMHG | HEART RATE: 82 BPM | BODY MASS INDEX: 33.82 KG/M2 | HEIGHT: 67 IN | OXYGEN SATURATION: 96 % | WEIGHT: 215.5 LBS

## 2018-03-07 DIAGNOSIS — I48.91 ATRIAL FIBRILLATION WITH CONTROLLED VENTRICULAR RESPONSE (H): ICD-10-CM

## 2018-03-07 DIAGNOSIS — E11.69 TYPE 2 DIABETES MELLITUS WITH OTHER SPECIFIED COMPLICATION (H): ICD-10-CM

## 2018-03-07 DIAGNOSIS — I50.30 (HFPEF) HEART FAILURE WITH PRESERVED EJECTION FRACTION (H): ICD-10-CM

## 2018-03-07 DIAGNOSIS — I48.19 PERSISTENT ATRIAL FIBRILLATION (H): ICD-10-CM

## 2018-03-07 LAB
ANION GAP SERPL CALCULATED.3IONS-SCNC: 9 MMOL/L (ref 3–14)
BUN SERPL-MCNC: 29 MG/DL (ref 7–30)
CALCIUM SERPL-MCNC: 9.3 MG/DL (ref 8.5–10.1)
CHLORIDE SERPL-SCNC: 97 MMOL/L (ref 94–109)
CO2 SERPL-SCNC: 27 MMOL/L (ref 20–32)
CREAT SERPL-MCNC: 1.04 MG/DL (ref 0.52–1.04)
GFR SERPL CREATININE-BSD FRML MDRD: 51 ML/MIN/1.7M2
GLUCOSE SERPL-MCNC: 225 MG/DL (ref 70–99)
POTASSIUM SERPL-SCNC: 4.2 MMOL/L (ref 3.4–5.3)
SODIUM SERPL-SCNC: 133 MMOL/L (ref 133–144)

## 2018-03-07 PROCEDURE — 80048 BASIC METABOLIC PNL TOTAL CA: CPT | Performed by: NURSE PRACTITIONER

## 2018-03-07 PROCEDURE — 36415 COLL VENOUS BLD VENIPUNCTURE: CPT | Performed by: NURSE PRACTITIONER

## 2018-03-07 PROCEDURE — 99214 OFFICE O/P EST MOD 30 MIN: CPT | Mod: ZP | Performed by: NURSE PRACTITIONER

## 2018-03-07 PROCEDURE — G0463 HOSPITAL OUTPT CLINIC VISIT: HCPCS | Mod: ZF

## 2018-03-07 RX ORDER — TORSEMIDE 20 MG/1
20 TABLET ORAL 2 TIMES DAILY
Qty: 60 TABLET | Refills: 3 | Status: SHIPPED | OUTPATIENT
Start: 2018-03-07 | End: 2018-03-21

## 2018-03-07 RX ORDER — METOPROLOL SUCCINATE 25 MG/1
12.5 TABLET, EXTENDED RELEASE ORAL 2 TIMES DAILY
Qty: 15 TABLET | Refills: 0 | COMMUNITY
Start: 2018-03-07 | End: 2018-03-16

## 2018-03-07 RX ORDER — METOPROLOL SUCCINATE 100 MG/1
TABLET, EXTENDED RELEASE ORAL
Qty: 90 TABLET | Refills: 3 | COMMUNITY
Start: 2018-03-07 | End: 2018-10-22

## 2018-03-07 ASSESSMENT — PAIN SCALES - GENERAL: PAINLEVEL: NO PAIN (0)

## 2018-03-07 NOTE — NURSING NOTE
Chief Complaint   Patient presents with     Follow Up For     Return [facility]; 77 year old with diastolic heart failure presenting for follow up with labs prior     Vitals were taken and medications were reconciled.  CHARLES Perez  4:25 PM

## 2018-03-07 NOTE — LETTER
3/7/2018      RE: Betty Tee  [address]  [facility] [address]       Dear Colleague,    Thank you for the opportunity to participate in the care of your patient, Betty Tee, at the [facility]. Please see a copy of my visit note below.    HPI:   Ms. 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  She is using her oxygen for long distances at [employer]. She presents to [facility] clinic for routine follow up.     She complains of worsening KIMBLE, but is still only needing oxygen for long distances. She complains of abdominal distention and LE edema. She also complains of lightheadedness and dizziness with denies fever, chills, lightheadedness, dizziness, chest pain, palpitations, PND, orthopnea, nausea, vomiting, diarrhea, hematochezia, melena, or LE edema. Her weight continues to increase slightly at 211-213 lbs. She continues to follow a low sodium diet.      PAST MEDICAL HISTORY:  Past Medical History:   Diagnosis Date     Alcohol abuse, in remission      Allergic rhinitis, cause unspecified     allegra helps when she takes it     Antiplatelet or antithrombotic long-term use      Atrial fibrillation (H)     in hosp in 11/11 after surgery w/ fluid overload     Cardiomegaly     LVH on stress echo- cardiac w/u at [facility] ER- neg CT scan for PE, neg stress echo in 8/06     Chest pain, unspecified      Disorder of bone and cartilage, unspecified     osteopenia (had been on prempro), improved on 6/06 dexa, stable dexa 11/10     Diverticulosis of colon (without mention of hemorrhage)     last episode yrs ago     Essential hypertension, benign      Gastro-oesophageal reflux disease      Insomnia, unspecified      weaned off clonazepam     Irregular heart beat      Lumbago 7/09    MRI with NEAL, now seeing Dr. Cain for sciatic sx's     Major depressive disorder, recurrent episode, moderate (H)      Obstructive sleep apnea      Osteoarthrosis, unspecified whether generalized or localized, unspecified site      Sjogren's syndrome (H)      Sleep apnea      Tobacco use disorder     chantix in 9/07, started again in 6/08, working       FAMILY HISTORY:  Family History   Problem Relation Age of Onset     C.A.D. Mother 63     MI- first at age 63     HEART DISEASE Mother      Hypertension Mother      CEREBROVASCULAR DISEASE Mother      Hyperlipidemia Mother      Alcohol/Drug Father      Alzheimer Disease Father      Dementia Father      Hypertension Father      Hyperlipidemia Father      C.A.D. Sister 52     Minor MI- age 50's     HEART DISEASE Sister      Hypertension Sister      Hypertension Sister      Hypertension Brother      Cancer - colorectal Sister 48     Late 40's early 50's     Prostate Cancer Brother 74     Dx'd age 74     GASTROINTESTINAL DISEASE Sister      Diverticulitis     GASTROINTESTINAL DISEASE Brother      Diverticulitis     Lipids Sister      Lipids Sister      Parkinsonism Brother      DIABETES Sister      HEART DISEASE Sister      CHF     CANCER Sister      lung, smoker     Substance Abuse Sister      Substance Abuse Brother      Asthma Sister      CANCER Sister      Breast Cancer Daughter      Prostate Cancer Brother      Hyperlipidemia Brother        SOCIAL HISTORY:  Social History     Social History     Marital status: Single     Spouse name: N/A     Number of children: 0     Years of education: Ed Spec De     Occupational History     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     Social History Main Topics     Smoking status: Former Smoker     Packs/day: 0.50     Years: 10.00     Types: Cigarettes     Quit date: 8/1/2011     Smokeless tobacco: Never Used       Comment: 1/2 ppd     Alcohol use No      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     Drug use: No     Sexual activity: No     Other Topics Concern     Not on file     Social History Narrative    Social Documentation:        Balanced Diet: YES    Calcium intake: 1-2 per day    Caffeine: 4-5 cups per day    Exercise:  type of activity limited right now due to foot injury    Sunscreen: No    Seatbelts:  Yes    Self Breast Exam:  Yes    Self Testicular Exam: n/a    Physical/Emotional/Sexual Abuse: No    Do you feel safe in your environment? No-pt lives in [facility]        Cholesterol screen up to date: No-will check today    Eye Exam up to date: Yes    Dental Exam up to date: Yes    Pap smear up to date: Does Not Apply    Mammogram up to date: Yes-10/07    Dexa Scan up to date: Yes-2006    Colonoscopy up to date: Yes-2006    Immunizations up to date: Yes-td 2002    Glucose screen if over 40:  Yes    '09                       CURRENT MEDICATIONS:  Outpatient Medications Prior to Visit   Medication Sig Dispense Refill     NOVOLOG FLEXPEN 100 UNIT/ML soln INJECT 12 UNITS SUBCUTANEOUS 3 TIMES DAILY (WITH MEALS) 15 mL 0     potassium chloride SA (K-DUR/KLOR-CON M) 20 MEQ CR tablet Increase KCL to 40 MEQ in AM and 20 MEQ in the evening. 180 tablet 3     escitalopram (LEXAPRO) 10 MG tablet TAKE 1 TABLET (10 MG) BY MOUTH DAILY 90 tablet 0     LANTUS SOLOSTAR 100 UNIT/ML soln INJECT 25 UNTIS SUBCUTANEOUSLY EVERY DAY 15 mL 0     montelukast (SINGULAIR) 10 MG tablet TAKE 1 TABLET BY MOUTH AT BEDTIME 90 tablet 1     lisinopril (PRINIVIL/ZESTRIL) 2.5 MG tablet TAKE 1 TABLET (2.5 MG) BY MOUTH DAILY 90 tablet 0     metFORMIN (GLUCOPHAGE-XR) 500 MG 24 hr tablet TAKE 2 TABLETS BY MOUTH DAILY WITH DINNER 180 tablet 0     warfarin (COUMADIN) 7.5 MG tablet TAKE 1 TABLET BY MOUTH DAILY. CURRENT DOSE IS 7.5 MG DAILY. DOSE ADJUSTED PER INR RESULT. 90 tablet 0     order for DME Equipment being ordered: Full face mask for CPAP - size: F10 large  1 each 0     traZODone (DESYREL) 50 MG tablet TAKE 1/2-1 TABLET BY MOUTH NIGHTLY AS NEEDED, CAN TITRATE DOWN TO 1/2TAB OR UP TO 2TABS AS NEEDED 180 tablet 0     atorvastatin (LIPITOR) 20 MG tablet Take 1 tablet (20 mg) by mouth daily 90 tablet 0     spironolactone (ALDACTONE) 25 MG tablet Take 2 tablets (50 mg) by mouth daily 180 tablet 3     blood glucose monitoring (NO BRAND SPECIFIED) test strip Use to test blood sugars 4 times daily or as directed 300 strip 3     blood glucose monitoring (ACCU-CHEK MULTICLIX) lancets Use to test blood sugar 4 times daily or as directed. 4 Box 3     insulin pen needle (B-D U/F) 31G X 8 MM Use 4 times daily (with Lantus and Novolog) or as directed. 400 each 3     insulin pen needle (BD JANE U/F) 32G X 4 MM Use 4 daily as directed. 200 each 11     blood glucose monitoring (ACCU-CHEK SHANNON PLUS) test strip Use to test blood sugar 4 times daily or as directed.  Ok to substitute alternative if insurance prefers. 120 strip 11     blood glucose monitoring (ACCU-CHEK FASTCLIX) lancets Use to test blood sugar 4 times daily or as directed.  Ok to substitute alternative if insurance prefers. 1 Box 11     predniSONE (DELTASONE) 10 MG tablet Take 2 tablets for three days (4/12-14), then take 1 tablet daily (10 mg daily starting 4/15) 90 tablet 3     ketoconazole (NIZORAL) 2 % cream Apply topically 2 times daily 60 g 1     albuterol (PROAIR HFA, PROVENTIL HFA, VENTOLIN HFA) 108 (90 BASE) MCG/ACT inhaler Inhale 2 puffs into the lungs every 6 hours 1 Inhaler 3     order for DME Oxygen: Patient requires supplemental Oxygen 4 LPM via nasal canula with activity. Please provide patient with a home unit and with portability capability. Oxygen will be for a lifetime. 1 Device 0     acyclovir (ZOVIRAX) 5 % ointment Apply topically 6 times daily (Patient taking differently: Apply topically 6 times daily As needed for outbreaks) 15 g 3     fluticasone (FLOVENT HFA) 220 MCG/ACT inhaler Inhale 2 puffs into  the lungs 2 times daily 3 Inhaler 3     acyclovir (ZOVIRAX) 400 MG tablet Take 400 mg by mouth See Admin Instructions 5 times daily as needed for outbreaks       fluticasone (FLONASE) 50 MCG/ACT nasal spray Spray 1-2 sprays into both nostrils daily (Patient taking differently: Spray 1-2 sprays into both nostrils daily as needed for allergies ) 16 g 5     COMPRESSION STOCKINGS Wear compression stockings in affected leg (right leg) or both legs most of the time during the day and take them off at night. 2 each 2     acetaminophen (TYLENOL) 500 MG tablet Take 500 mg by mouth nightly as needed        metoprolol succinate (TOPROL-XL) 100 MG 24 hr tablet Take 1 tablet (100 mg) by mouth daily Take with 25mg of Toprol XL for total of 125mg daily. 90 tablet 3     metoprolol succinate (TOPROL XL) 25 MG 24 hr tablet Take 1 tablet (25 mg) by mouth daily Take with 100mg Toprol XL for total of 125mg daily 15 tablet 0     furosemide (LASIX) 40 MG tablet Increase Lasix to 60 mg po in AM and 40 mg at  tablet 3     alendronate (FOSAMAX) 70 MG tablet Take 1 tablet (70 mg) by mouth every 7 days (Patient not taking: Reported on 1/31/2018) 4 tablet 11     No facility-administered medications prior to visit.        ROS:   CONSTITUTIONAL: Denies fever, chills, fatigue. She complains of weight gain.   HEENT: Denies headache, vision changes, and changes in speech.   CV: Refer to HPI.   PULMONARY:Denies shortness of breath, cough, or previous TB exposure.   GI: Denies nausea, vomiting, diarrhea, and abdominal pain. Bowel movements are regular. Complains of abdominal distention.   : Denies urinary alterations, dysuria, urinary frequency, hematuria, and abnormal drainage.   EXT: Complains of LE edema, around calves.   SKIN: Denies abnormal rashes or lesions.   MUSCULOSKELETAL: Denies upper or lower extremity weakness and pain.   NEUROLOGIC: Denies lightheadedness, dizziness, seizures, or upper or lower extremity paresthesia.  "    EXAM:  /75 (BP Location: Right arm, Patient Position: Chair, Cuff Size: Adult Large)  Pulse 82  Ht 1.702 m (5' 7\")  Wt 97.8 kg (215 lb 8 oz)  SpO2 96%  BMI 33.75 kg/m2  GENERAL: Appears alert and oriented times three.   HEENT: Eye symmetrical and free of discharge bilaterally. Mucous membranes moist and without lesions.  NECK: Supple and without lymphadenopathy. JVD to tragus.   CV: Irregular, controlled. S1S2 present without murmur, rub, or gallop.   RESPIRATORY: Respirations regular, even, and unlabored. Lungs CTA throughout.   GI: Soft and distended with normoactive bowel sounds present in all quadrants. No tenderness, rebound, guarding. No organomegaly.   EXTREMITIES: Trace bilateral LE edema. 2+ bilateral pedal pulses.   NEUROLOGIC: Alert and orientated x 3. CN II-XII grossly intact. No focal deficits.   MUSCULOSKELETAL: No joint swelling or tenderness.   SKIN: No jaundice. No rashes or lesions.     Labs:  CBC RESULTS:  Lab Results   Component Value Date    WBC 15.6 (H) 11/08/2017    RBC 4.59 11/08/2017    HGB 13.4 11/08/2017    HCT 42.1 11/08/2017    MCV 92 11/08/2017    MCH 29.2 11/08/2017    MCHC 31.8 11/08/2017    RDW 16.7 (H) 11/08/2017     11/08/2017       CMP RESULTS:  Lab Results   Component Value Date     03/07/2018    POTASSIUM 4.2 03/07/2018    CHLORIDE 97 03/07/2018    CO2 27 03/07/2018    ANIONGAP 9 03/07/2018     (H) 03/07/2018    BUN 29 03/07/2018    CR 1.04 03/07/2018    GFRESTIMATED 51 (L) 03/07/2018    GFRESTBLACK 62 03/07/2018    CLAUDY 9.3 03/07/2018    BILITOTAL 0.6 10/06/2017    ALBUMIN 3.1 (L) 10/06/2017    ALKPHOS 121 10/06/2017    ALT 43 10/06/2017    AST 37 10/06/2017        INR RESULTS:  Lab Results   Component Value Date    INR 1.6 (A) 01/31/2018    INR 2.78 (H) 12/19/2017       Lab Results   Component Value Date    MAG 2.0 04/11/2017     Lab Results   Component Value Date    NTBNPI 3531 (H) 04/06/2017     Lab Results   Component Value Date    NTBNP " 460 (H) 2018       Diagnostics:  Recent Results (from the past 4320 hour(s))   Echocardiogram Complete    Narrative    [id number]  [facility]19  [id number]  [id number]^MELINDA^LOS^           [facility]  Diagnostic and Treamtent-3rd Floor  [address] [place] [address]     Name: JEAN KENNEDY  MRN: [id number]  : 1940  Study Date: 10/17/2017 10:42 AM  Age: 77 yrs  Gender: Female  Patient Location: [facility]  Reason For Study: , Dyspnea, unspecified  Ordering Physician: LOS LAWRENCE  Referring Physician: LOS LAWRENCE  Performed By: Katerine Silva RDCS     BSA: 2.0 m2  Height: 66 in  Weight: 206 lb  HR: 95  BP: 125/82 mmHg  _____________________________________________________________________________  __        Procedure  Echocardiogram with two-dimensional, color and spectral Doppler performed.  Technically difficult study. Poor acoustic windows.  _____________________________________________________________________________  __        Interpretation Summary  Technically difficult study. Poor acoustic windows.  Global and regional left ventricular function is normal with an EF of 60-65%.  Mild right ventricular dilation is present. Global right ventricular function  is normal.  Pulmonary artery systolic pressure is normal.  The inferior vena cava is normal. Estimated mean right atrial pressure is 3  mmHg.  No pericardial effusion is present.  _____________________________________________________________________________  __        Left Ventricle  Global and regional left ventricular function is normal with an EF of 60-65%.  Left ventricular size is normal. Borderline concentric wall thickening  consistent with left ventricular hypertrophy is present. Grade II or moderate  diastolic dysfunction. Regional wall motion cannot be assessed.     Right Ventricle  Global right ventricular function is normal. Mild right ventricular dilation  is present.     Atria  Mild  biatrial enlargement is present. The atrial septum is intact as assessed  by color Doppler .     Mitral Valve  The mitral valve is normal. Mild mitral insufficiency is present.        Aortic Valve  Aortic valve is normal in structure and function.     Tricuspid Valve  The tricuspid valve is normal. Mild tricuspid insufficiency is present.  Pulmonary artery systolic pressure is normal. The right ventricular systolic  pressure is approximated at 23.6 mmHg plus the right atrial pressure.     Pulmonic Valve  Mild pulmonic insufficiency is present.     Vessels  The aorta root is normal. The inferior vena cava is normal. Estimated mean  right atrial pressure is 3 mmHg.     Pericardium  No pericardial effusion is present. Prominent epicardial fat is noted.        Compared to Previous Study  Comparison with prior studies is difficult due to poor image quality.  _____________________________________________________________________________  __  MMode/2D Measurements & Calculations  LA Volume (BP): 85.2 ml     LA Volume Index (BP): 42.0 ml/m2        Doppler Measurements & Calculations  MV E max gerald: 110.5 cm/sec  MV A max gerald: 28.4 cm/sec  MV E/A: 3.9  MV dec time: 0.16 sec  Ao V2 max: 113.0 cm/sec  Ao max P.0 mmHg  LV V1 max P.6 mmHg  LV V1 max: 106.7 cm/sec  LV V1 VTI: 22.2 cm  PA V2 max: 98.0 cm/sec  PA max PG: 3.8 mmHg  PI end-d gerald: 128.8 cm/sec  PI max gerald: 208.7 cm/sec  TR max gerald: 243.0 cm/sec  TR max P.6 mmHg     Pulm Sys Gerald: 17.4 cm/sec  Pulm Shya Gerald: 72.8 cm/sec  Pulm S/D: 0.24  Lateral E/e': 15.9  Med E to E': 13.9  Medial E/e': 13.9     _____________________________________________________________________________  __           Report approved by: Jd Daugherty 10/17/2017 01:53 PM        Assessment and Plan:   Ms. 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 She presents to [facility] clinic for routine follow up and remains hypervolemic with worsening KIMBLE.       Heart failure with preserved ejection fraction.   ACC Functional Class III  Rate control: HR-82. Change Toprol XL to 62.6 mg po BID to see if this improves lightheadedness as it appears positional. Notified patient if any worsening palpitations she will notify us immediately.   volume status: Hypervolemic. D/C Lasix. Change to Torsemide 20 mg po BID. Continue current KCL.   Blood pressure control: BP stable. Lisinopril and Toprol XL.   Aldosterone antagonist: Aldactone 50 mg po daily   Evaluation of coronary arteries: Lexiscan negative for ischemia 6/14  Sleep apnea screening: ANGELICA on CPAP      HTN.   - BP controlled on current regimen.       Afib. Holter notes persistent afib 100% of the time with rates up to 140's at rest. CHADSVASC-5.    - Change Toprol XL to 62.5 mg po BID to see if this improves lightheadedness.   - Coumadin per Coumadin clinic.   - Appreciate EP consult.       ILD and Follicular Bronchiolitis  - Management per Pulmonary.       GERD with PUD. History of GI bleed secondary to diverticular bleed 6/28/16 resolved with oral Vitamin K.    - Protonix to 40 mg po BID.       Sjogren's Disease.   - Management per Rheumatology.     Follow up BMP in 1 week. Follow up in CORE in 1 month.     Kiesha Elias  3/6/2018    JOSE LUIS ANAYA,

## 2018-03-07 NOTE — PATIENT INSTRUCTIONS
"You were seen today in the Cardiovascular Clinic at the [facility].     Cardiology Providers you saw during your visit: Kiesha Elias NP     1. Change the way you take Metoprolol XL to 62.5 mg two times a day.  2. Stop taking Lasix.  3. Start taking Torsemide 20 mg two times a day.   4. Repeat Basic Metabolic Panel lab in one week.   5. Please make a follow-up CORE/heart failure appt with Kiesha in 1 month.        Results for JEAN KENNEDY (MRN [id number]) as of 3/7/2018 16:44   Ref. Range 3/7/2018 16:09   Sodium Latest Ref Range: 133 - 144 mmol/L 133   Potassium Latest Ref Range: 3.4 - 5.3 mmol/L 4.2   Chloride Latest Ref Range: 94 - 109 mmol/L 97   Carbon Dioxide Latest Ref Range: 20 - 32 mmol/L 27   Urea Nitrogen Latest Ref Range: 7 - 30 mg/dL 29   Creatinine Latest Ref Range: 0.52 - 1.04 mg/dL 1.04   GFR Estimate Latest Ref Range: >60 mL/min/1.7m2 51 (L)   GFR Estimate If Black Latest Ref Range: >60 mL/min/1.7m2 62   Calcium Latest Ref Range: 8.5 - 10.1 mg/dL 9.3   Anion Gap Latest Ref Range: 3 - 14 mmol/L 9   Glucose Latest Ref Range: 70 - 99 mg/dL 225 (H)     Please limit your fluid intake to 2 L (64 ounces) daily.  2 Liters a day = 8.5 cups, or 72 ounces.  Please limit your salt intake to 2 grams a day or less.     If you gain 2# in 24 hours or 5# in one week call Beatriz Blanco RN so we can adjust your medications as needed over the phone.     Please feel free to call me with any questions or concerns.       Beatriz Blanco RN  [facility]  Cardiology Care Coordinator-Heart Failure Clinic     Questions and [phone].   First press #1 for the [employer] and then press #3 for \"Medical Questions\" to reach us Cardiology Nurses.      On Call Cardiologist for after hours or on weekends: [phone]   option #4 and ask to speak to the on-call Cardiologist. Inform them you are a CORE/heart failure patient at the [place].           If you need a " medication refill please contact your pharmacy.  Please allow 3 business days for your refill to be completed.  _______________________________________________________  [facility]IN[facility] Cardiomyopathy, Optimization, Rehabilitation, Education   The [facility].R.E[facility] CLINIC is a heart failure specialty clinic within the [facility] where you will work with specialized nurse practitioners dedicated to helping patients with heart failure carefully adjust medications, receive education, and learn who and when to call if symptoms develop. They specialize in helping you better understand your condition, slow the progression of your disease, improve the length and quality of your life, help you detect future heart problems before they become life threatening, and avoid hospitalizations.  As always, thank you for trusting us with your health care needs!

## 2018-03-07 NOTE — MR AVS SNAPSHOT
After Visit Summary   3/7/2018    Betty Tee    MRN: [id number]           Patient Information     Date Of Birth          1940        Visit Information        Provider Department      3/7/2018 5:00 PM Kiesha Elias APRN CNP [facility]        Today's Diagnoses     (HFpEF) heart failure with preserved ejection fraction (H)        Persistent atrial fibrillation (H)        Atrial fibrillation with controlled ventricular response (H)          Care Instructions    You were seen today in the Cardiovascular Clinic at the [facility].     Cardiology Providers you saw during your visit: Kiesha Elias NP     1. Change the way you take Metoprolol XL to 62.5 mg two times a day.  2. Stop taking Lasix.  3. Start taking Torsemide 20 mg two times a day.   4. Repeat Basic Metabolic Panel lab in one week.   5. Please make a follow-up CORE/heart failure appt with Kiesha in 1 month.        Results for BETTY TEE (MRN [id number]) as of 3/7/2018 16:44   Ref. Range 3/7/2018 16:09   Sodium Latest Ref Range: 133 - 144 mmol/L 133   Potassium Latest Ref Range: 3.4 - 5.3 mmol/L 4.2   Chloride Latest Ref Range: 94 - 109 mmol/L 97   Carbon Dioxide Latest Ref Range: 20 - 32 mmol/L 27   Urea Nitrogen Latest Ref Range: 7 - 30 mg/dL 29   Creatinine Latest Ref Range: 0.52 - 1.04 mg/dL 1.04   GFR Estimate Latest Ref Range: >60 mL/min/1.7m2 51 (L)   GFR Estimate If Black Latest Ref Range: >60 mL/min/1.7m2 62   Calcium Latest Ref Range: 8.5 - 10.1 mg/dL 9.3   Anion Gap Latest Ref Range: 3 - 14 mmol/L 9   Glucose Latest Ref Range: 70 - 99 mg/dL 225 (H)     Please limit your fluid intake to 2 L (64 ounces) daily.  2 Liters a day = 8.5 cups, or 72 ounces.  Please limit your salt intake to 2 grams a day or less.     If you gain 2# in 24 hours or 5# in one week call Beatriz Blanco RN so we can adjust your medications as needed over the phone.     Please feel free to call me with any questions or  "concerns.       Beatriz Blanco RN  [facility]  Cardiology Care Coordinator-Heart Failure Clinic     Questions and [phone].   First press #1 for the University and then press #3 for \"Medical Questions\" to reach us Cardiology Nurses.      On Call Cardiologist for after hours or on weekends: [phone]   option #4 and ask to speak to the on-call Cardiologist. Inform them you are a CORE/heart failure patient at the [facility].           If you need a medication refill please contact your pharmacy.  Please allow 3 business days for your refill to be completed.  _______________________________________________________  C.O.R.E. CLINIC Cardiomyopathy, Optimization, Rehabilitation, Education   The C.O.R.E. CLINIC is a heart failure specialty clinic within the [facility] where you will work with specialized nurse practitioners dedicated to helping patients with heart failure carefully adjust medications, receive education, and learn who and when to call if symptoms develop. They specialize in helping you better understand your condition, slow the progression of your disease, improve the length and quality of your life, help you detect future heart problems before they become life threatening, and avoid hospitalizations.  As always, thank you for trusting us with your health care needs!             Follow-ups after your visit        Additional Services     Follow-Up with [facility]                 Your next 10 appointments already scheduled     Mar 08, 2018  3:00 PM CST   (Arrive by 2:45 PM)   Return Visit with Carmenza Stringer MD   [facility] ([facility])    [address]  [address]  [place] [address] [phone]            Mar 28, 2018  1:00 PM CDT   Office Visit with Ilene Tristan MD   [facility] ([facility])    [address]  [facility] [address] [phone]           Bring a current list of meds and any records pertaining to this visit. For Physicals, please bring immunization records and any forms needing to be filled out. Please arrive 10 minutes early to complete paperwork.            Apr 26, 2018  3:00 PM CDT   Lab with  LAB    [facility] ([facility])    [address]  1st Floor  [facility] [address]   [phone]            Apr 26, 2018  3:30 PM CDT   (Arrive by 3:15 PM)   RETURN HEART FAILURE with Radha Rosa MD   [facility] ([facility])    [address] [place] [address]  [address]  [facility] [address]   [phone]            May 02, 2018 11:30 AM CDT   Lab with  LAB   [facility] ([facility])    [address] [place] [address]  1st Floor  [facility] [address]   [phone]            May 02, 2018 12:00 PM CDT   (Arrive by 11:45 AM)   CORE RETURN with LIZY Tyler CNP   [facility] ([facility])    [address]  [address]  [facility] [address]   [phone]            Jul 18, 2018 12:30 PM CDT   FULL PULMONARY FUNCTION with  PFL C   [facility] Function Testing ([facility])    [address] [place] [address]  3rd Floor  [facility] [address] [phone]            Jul 18, 2018  1:30 PM CDT   (Arrive by 1:15 PM)   Return Interstitial Lung with Meño Walsh MD   [facility] ([facility])    [address] [place] [address]  [address]  [facility] [address] [phone]              Future tests that were ordered for you today     Open Future Orders        [facility] Expected Expires Ordered    Follow-Up with CORE Clinic Routine 4/11/2018 6/12/2018 3/7/2018    Basic metabolic panel Routine 3/14/2018 3/7/2019 3/7/2018            Who to contact     If you have questions or need  "follow up information about today's clinic visit or your schedule please contact [facility] directly at [phone].  Normal or non-critical lab and imaging results will be communicated to you by Zencoderhart, letter or phone within 4 business days after the clinic has received the results. If you do not hear from us within 7 days, please contact the clinic through Zencoderhart or phone. If you have a critical or abnormal lab result, we will notify you by phone as soon as possible.  Submit refill requests through [facility] or call your pharmacy and they will forward the refill request to us. Please allow 3 business days for your refill to be completed.          Additional Information About Your Visit        Zencoderhart Information     [facility] gives you secure access to your electronic health record. If you see a primary care provider, you can also send messages to your care team and make appointments. If you have questions, please call your primary care clinic.  If you do not have a primary care provider, please call [phone] and they will assist you.        Care EveryWhere ID     This is your Care EveryWhere ID. This could be used by other organizations to access your [facility] medical records  [id number]        Your Vitals Were     Pulse Height Pulse Oximetry BMI (Body Mass Index)          82 1.702 m (5' 7\") 96% 33.75 kg/m2         Blood Pressure from Last 3 Encounters:   03/07/18 111/75   02/21/18 110/72   02/16/18 104/65    Weight from Last 3 Encounters:   03/07/18 97.8 kg (215 lb 8 oz)   02/21/18 96.8 kg (213 lb 8 oz)   02/16/18 97 kg (213 lb 14.4 oz)              We Performed the Following     Follow-Up with [facility]          Today's Medication Changes          These changes are accurate as of 3/7/18  5:57 PM.  If you have any questions, ask your nurse or doctor.               Start taking these medicines.        Dose/Directions    torsemide 20 MG tablet   Commonly known as:  DEMADEX   Used for:  (HFpEF) " heart failure with preserved ejection fraction (H)   Started by:  Kiesha Elias APRN CNP        Dose:  20 mg   Take 1 tablet (20 mg) by mouth 2 times daily   Quantity:  60 tablet   Refills:  3         These medicines have changed or have updated prescriptions.        Dose/Directions    acyclovir 5 % ointment   Commonly known as:  ZOVIRAX   This may have changed:  additional instructions   Used for:  Recurrent cold sores        Apply topically 6 times daily   Quantity:  15 g   Refills:  3       fluticasone 50 MCG/ACT spray   Commonly known as:  FLONASE   This may have changed:    - when to take this  - reasons to take this   Used for:  Seasonal allergic rhinitis        Dose:  1-2 spray   Spray 1-2 sprays into both nostrils daily   Quantity:  16 g   Refills:  5       * TOPROL XL 25 MG 24 hr tablet   This may have changed:    - how much to take  - when to take this  - additional instructions   Used for:  (HFpEF) heart failure with preserved ejection fraction (H), Persistent atrial fibrillation (H)   Generic drug:  metoprolol succinate   Changed by:  Kiesha Elias APRN CNP        Dose:  12.5 mg   Take 0.5 tablets (12.5 mg) by mouth 2 times daily Take 50 mg by mouth in combination with 12.5 for a total of 62.5 twice a day   Quantity:  15 tablet   Refills:  0       * metoprolol succinate 100 MG 24 hr tablet   Commonly known as:  TOPROL-XL   This may have changed:    - how much to take  - how to take this  - when to take this  - additional instructions   Used for:  Atrial fibrillation with controlled ventricular response (H)   Changed by:  Kiesha Elias APRN CNP        Take 50 mg by mouth in combination with 12.5 for a total of 62.5 twice a day   Quantity:  90 tablet   Refills:  3       * Notice:  This list has 2 medication(s) that are the same as other medications prescribed for you. Read the directions carefully, and ask your doctor or other care provider to review them with you.      Stop taking these  medicines if you haven't already. Please contact your care team if you have questions.     furosemide 40 MG tablet   Commonly known as:  LASIX   Stopped by:  Kiesha Elias APRN CNP                Where to get your medicines      These medications were sent to [id number] - [place], [place] - [address]  [address] [place] [facility] [address]     Phone:  [phone]     torsemide 20 MG tablet                Primary Care Provider Office Phone # Fax #    Ilene Tristan [phone] [address]  [facility] [address]        Equal Access to Services     [facility]: Hadii aad ku hadasho Soomaali, waaxda luqadaha, qaybta kaalmada adeegyada, waxugo mercedes . [facility] [place] [phone].    ATENCIÓN: Si habla español, tiene a conti disposición servicios gratuitos de asistencia lingüística. [facility] [phone].    We comply with applicable federal civil rights laws and [place] laws. We do not discriminate on the basis of race, color, national origin, age, disability, sex, sexual orientation, or gender identity.            Thank you!     Thank you for choosing [facility]  for your care. Our goal is always to provide you with excellent care. Hearing back from our patients is one way we can continue to improve our services. Please take a few minutes to complete the written survey that you may receive in the mail after your visit with us. Thank you!             Your Updated Medication List - Protect others around you: Learn how to safely use, store and throw away your medicines at www.disposemymeds.org.          This list is accurate as of 3/7/18  5:57 PM.  Always use your most recent med list.                   Brand Name Dispense Instructions for use Diagnosis    acyclovir 400 MG tablet    ZOVIRAX     Take 400 mg by mouth See Admin Instructions 5 times daily as needed for outbreaks        acyclovir 5 % ointment     ZOVIRAX    15 g    Apply topically 6 times daily    Recurrent cold sores       albuterol 108 (90 BASE) MCG/ACT Inhaler    PROAIR HFA/PROVENTIL HFA/VENTOLIN HFA    1 Inhaler    Inhale 2 puffs into the lungs every 6 hours    ILD (interstitial lung disease) (H)       alendronate 70 MG tablet    FOSAMAX    4 tablet    Take 1 tablet (70 mg) by mouth every 7 days    Other osteoporosis without current pathological fracture       atorvastatin 20 MG tablet    LIPITOR    90 tablet    Take 1 tablet (20 mg) by mouth daily    Hyperlipidemia LDL goal <100       * blood glucose monitoring lancets     4 Box    Use to test blood sugar 4 times daily or as directed.    Type 2 diabetes mellitus without complication, without long-term current use of insulin (H)       * blood glucose monitoring lancets     1 Box    Use to test blood sugar 4 times daily or as directed.  Ok to substitute alternative if insurance prefers.    Type 2 diabetes mellitus without complication, without long-term current use of insulin (H)       * blood glucose monitoring test strip    no brand specified    300 strip    Use to test blood sugars 4 times daily or as directed    Type 2 diabetes mellitus without complication, without long-term current use of insulin (H)       * blood glucose monitoring test strip    ACCU-CHEK SHANNON PLUS    120 strip    Use to test blood sugar 4 times daily or as directed.  Ok to substitute alternative if insurance prefers.    Type 2 diabetes mellitus without complication, without long-term current use of insulin (H)       COMPRESSION STOCKINGS     2 each    Wear compression stockings in affected leg (right leg) or both legs most of the time during the day and take them off at night.    DVT (deep venous thrombosis), right, Postphlebitic syndrome, Chronic anticoagulation, Atrial fibrillation (H)       escitalopram 10 MG tablet    LEXAPRO    90 tablet    TAKE 1 TABLET (10 MG) BY MOUTH DAILY    Major depressive disorder, recurrent episode,  moderate (H)       fluticasone 220 MCG/ACT Inhaler    FLOVENT HFA    3 Inhaler    Inhale 2 puffs into the lungs 2 times daily    ILD (interstitial lung disease) (H), Follicular bronchiolitis (H)       fluticasone 50 MCG/ACT spray    FLONASE    16 g    Spray 1-2 sprays into both nostrils daily    Seasonal allergic rhinitis       * insulin pen needle 31G X 8 MM    B-D U/F    400 each    Use 4 times daily (with Lantus and Novolog) or as directed.    Type 2 diabetes mellitus without complication, without long-term current use of insulin (H)       * insulin pen needle 32G X 4 MM    BD JANE U/F    200 each    Use 4 daily as directed.    Type 2 diabetes mellitus without complication, without long-term current use of insulin (H)       ketoconazole 2 % cream    NIZORAL    60 g    Apply topically 2 times daily    Cutaneous candidiasis       LANTUS SOLOSTAR 100 UNIT/ML injection   Generic drug:  insulin glargine     15 mL    INJECT 25 UNTIS SUBCUTANEOUSLY EVERY DAY    Type 2 diabetes mellitus with hyperglycemia, with long-term current use of insulin (H)       lisinopril 2.5 MG tablet    PRINIVIL/Zestril    90 tablet    TAKE 1 TABLET (2.5 MG) BY MOUTH DAILY    Essential hypertension with goal blood pressure less than 140/90       metFORMIN 500 MG 24 hr tablet    GLUCOPHAGE-XR    180 tablet    TAKE 2 TABLETS BY MOUTH DAILY WITH DINNER    Type 2 diabetes mellitus without complication, without long-term current use of insulin (H)       montelukast 10 MG tablet    SINGULAIR    90 tablet    TAKE 1 TABLET BY MOUTH AT BEDTIME    Sjogren's syndrome with lung involvement (H), ILD (interstitial lung disease) (H), Chronic seasonal allergic rhinitis due to other allergen       NovoLOG FLEXPEN 100 UNIT/ML injection   Generic drug:  insulin aspart     15 mL    INJECT 12 UNITS SUBCUTANEOUS 3 TIMES DAILY (WITH MEALS)    Type 2 diabetes mellitus with other specified complication (H)       * order for DME     1 Device    Oxygen: Patient requires  supplemental Oxygen 4 LPM via nasal canula with activity. Please provide patient with a home unit and with portability capability. Oxygen will be for a lifetime.    Hypoxia, ILD (interstitial lung disease) (H)       * order for DME     1 each    Equipment being ordered: Full face mask for CPAP - size: F10 large    ANGELICA (obstructive sleep apnea)       potassium chloride SA 20 MEQ CR tablet    K-DUR/KLOR-CON M    180 tablet    Increase KCL to 40 MEQ in AM and 20 MEQ in the evening.    (HFpEF) heart failure with preserved ejection fraction (H)       predniSONE 10 MG tablet    DELTASONE    90 tablet    Take 2 tablets for three days (4/12-14), then take 1 tablet daily (10 mg daily starting 4/15)    ILD (interstitial lung disease) (H), Sjogren's syndrome (H)       spironolactone 25 MG tablet    ALDACTONE    180 tablet    Take 2 tablets (50 mg) by mouth daily    Chronic diastolic congestive heart failure (H)       * TOPROL XL 25 MG 24 hr tablet   Generic drug:  metoprolol succinate     15 tablet    Take 0.5 tablets (12.5 mg) by mouth 2 times daily Take 50 mg by mouth in combination with 12.5 for a total of 62.5 twice a day    (HFpEF) heart failure with preserved ejection fraction (H), Persistent atrial fibrillation (H)       * metoprolol succinate 100 MG 24 hr tablet    TOPROL-XL    90 tablet    Take 50 mg by mouth in combination with 12.5 for a total of 62.5 twice a day    Atrial fibrillation with controlled ventricular response (H)       torsemide 20 MG tablet    DEMADEX    60 tablet    Take 1 tablet (20 mg) by mouth 2 times daily    (HFpEF) heart failure with preserved ejection fraction (H)       traZODone 50 MG tablet    DESYREL    180 tablet    TAKE 1/2-1 TABLET BY MOUTH NIGHTLY AS NEEDED, CAN TITRATE DOWN TO 1/2TAB OR UP TO 2TABS AS NEEDED    Insomnia due to medical condition       TYLENOL 500 MG tablet   Generic drug:  acetaminophen      Take 500 mg by mouth nightly as needed    Dizziness       warfarin 7.5 MG tablet     COUMADIN    90 tablet    TAKE 1 TABLET BY MOUTH DAILY. CURRENT DOSE IS 7.5 MG DAILY. DOSE ADJUSTED PER INR RESULT.    Atrial fibrillation with controlled ventricular response (H)       * Notice:  This list has 10 medication(s) that are the same as other medications prescribed for you. Read the directions carefully, and ask your doctor or other care provider to review them with you.

## 2018-03-08 ENCOUNTER — CARE COORDINATION (OUTPATIENT)
Dept: CARDIOLOGY | Facility: CLINIC | Age: 78
End: 2018-03-08

## 2018-03-08 ENCOUNTER — OFFICE VISIT (OUTPATIENT)
Dept: RHEUMATOLOGY | Facility: CLINIC | Age: 78
End: 2018-03-08
Attending: INTERNAL MEDICINE
Payer: MEDICARE

## 2018-03-08 VITALS
TEMPERATURE: 98.2 F | HEIGHT: 67 IN | SYSTOLIC BLOOD PRESSURE: 112 MMHG | WEIGHT: 212 LBS | HEART RATE: 96 BPM | BODY MASS INDEX: 33.27 KG/M2 | DIASTOLIC BLOOD PRESSURE: 73 MMHG | OXYGEN SATURATION: 95 % | RESPIRATION RATE: 18 BRPM

## 2018-03-08 DIAGNOSIS — M35.02 SJOGREN'S SYNDROME WITH LUNG INVOLVEMENT (H): Primary | ICD-10-CM

## 2018-03-08 DIAGNOSIS — M19.90 INFLAMMATORY ARTHRITIS: ICD-10-CM

## 2018-03-08 DIAGNOSIS — J84.9 ILD (INTERSTITIAL LUNG DISEASE) (H): ICD-10-CM

## 2018-03-08 PROCEDURE — G0463 HOSPITAL OUTPT CLINIC VISIT: HCPCS | Mod: ZF

## 2018-03-08 RX ORDER — INSULIN ASPART 100 [IU]/ML
INJECTION, SOLUTION INTRAVENOUS; SUBCUTANEOUS
Start: 2018-03-08

## 2018-03-08 ASSESSMENT — PAIN SCALES - GENERAL: PAINLEVEL: SEVERE PAIN (7)

## 2018-03-08 NOTE — TELEPHONE ENCOUNTER
"Duplicate.  Refill sent 3/5  Sophia Hemphill RN    Requested Prescriptions   Refused Prescriptions Disp Refills     NOVOLOG FLEXPEN 100 UNIT/ML soln [Pharmacy Med Name: NOVOLOG 100 UNITS/ML FLEXPEN]       Sig: INJECT 12 UNITS SUBCUTANEOUS 3 TIMES DAILY (WITH MEALS)    Short Acting Insulin Protocol Passed    3/7/2018  8:40 AM       Passed - Blood pressure less than 140/90 in past 6 months    BP Readings from Last 3 Encounters:   03/07/18 111/75   02/21/18 110/72   02/16/18 104/65                Passed - LDL on file in past 12 months    Recent Labs   Lab Test  02/21/18   1230   LDL  5            Passed - Microalbumin on file in past 12 months    Recent Labs   Lab Test  10/06/17   1422   MICROL  14   UMALCR  29.65*            Passed - Serum creatinine on file in past 12 months    Recent Labs   Lab Test  03/07/18   1609   CR  1.04            Passed - HgbA1C in past 3 or 6 months    Recent Labs   Lab Test  01/31/18   1428   A1C  7.0*            Passed - Patient is age 18 or older       Passed - Recent (6 mo) or future (30 days) visit within the authorizing provider's specialty    Patient had office visit in the last 6 months or has a visit in the next 30 days with authorizing provider.  See \"Patient Info\" tab in inbasket, or \"Choose Columns\" in Meds & Orders section of the refill encounter.              "

## 2018-03-08 NOTE — PROGRESS NOTES
Here are your last cholesterol results...  The triglycerides are a bit high, but the LDL is too low.  We should talk about likely lowering your lipitor dose again at your upcoming appointment.  Shivam,  Lev Tristan MD  [facility]  [phone]

## 2018-03-08 NOTE — MR AVS SNAPSHOT
After Visit Summary   3/8/2018    Betty Tee    MRN: [id number]           Patient Information     Date Of Birth          1940        Visit Information        Provider Department      3/8/2018 3:00 PM Carmenza Stringer MD [facility] Rheumatology         Follow-ups after your visit        Your next 10 appointments already scheduled     Mar 19, 2018  2:00 PM CDT   LAB with [facility] ([facility])    [address] [address]   [phone]           Please do not eat 10-12 hours before your appointment if you are coming in fasting for labs on lipids, cholesterol, or glucose (sugar). This does not apply to pregnant women. Water, hot tea and black coffee (with nothing added) are okay. Do not drink other fluids, diet soda or chew gum.            Mar 28, 2018  1:00 PM CDT   Office Visit with Ilene Tristan MD   [facility] ([facility])    [address] [address]   [phone]           Bring a current list of meds and any records pertaining to this visit. For Physicals, please bring immunization records and any forms needing to be filled out. Please arrive 10 minutes early to complete paperwork.            Apr 11, 2018  2:00 PM CDT   Lab with  LAB   [facility] ([facility])    [address] [place] [address]   [phone]            Apr 11, 2018  2:30 PM CDT   (Arrive by 2:15 PM)   CORE RETURN with LIZY Tyler [facility] Heart [facility] ([facility])    [address]  [address]  [place] [address]   [phone]            Apr 26, 2018  3:00 PM CDT   Lab with  LAB   [facility] ([facility])    [address] [place] [address]   [phone]            Apr 26, 2018  3:30 PM CDT   (Arrive by 3:15 PM)   RETURN HEART  FAILURE with Radha Rosa MD   [facility] ([facility])    [address]  [address]  [place] [address]   [phone]            Jun 07, 2018  4:00 PM CDT   (Arrive by 3:45 PM)   Return Visit with Carmenza Stringer MD   [facility] Rheumatology ([facility])    [address]  [address]  [place] [facility] [address]   [phone]            Jul 18, 2018 12:30 PM CDT   FULL PULMONARY FUNCTION with UC PFL C   [facility] Pulmonary Function Testing ([facility])    [address]  [address]rd [address]  [place] [address] [phone]            Jul 18, 2018  1:30 PM CDT   (Arrive by 1:15 PM)   Return Interstitial Lung with Meño Walsh MD   [facility] ([facility])    [address]  [address]  [place] [id number] [phone]              Future tests that were ordered for you today     Open Future Orders        [facility] Expected Expires Ordered    Follow-Up with CORE Clinic Routine 4/11/2018 6/12/2018 3/7/2018    Basic metabolic panel Routine 3/14/2018 3/7/2019 3/7/2018            Who to contact     If you have questions or need follow up information about today's clinic visit or your schedule please contact [facility] RHEUMATOLOGY directly at [phone].  Normal or non-critical lab and imaging results will be communicated to you by MyChart, letter or phone within 4 business days after the clinic has received the results. If you do not hear from us within 7 days, please contact the clinic through MyChart or phone. If you have a critical or abnormal lab result, we will notify you by phone as soon as possible.  Submit refill requests through [employer] or call your pharmacy and they will forward the refill request to us. Please allow 3 business days for your refill to be completed.          Additional Information About Your Visit       "  "[employer]hart Information     [employer] gives you secure access to your electronic health record. If you see a primary care provider, you can also send messages to your care team and make appointments. If you have questions, please call your primary care clinic.  If you do not have a primary care provider, please call [phone] and they will assist you.        Care EveryWhere ID     This is your Care EveryWhere ID. This could be used by other organizations to access your [facility] medical records  [id number]        Your Vitals Were     Pulse Temperature Respirations Height Pulse Oximetry BMI (Body Mass Index)    96 98.2  F (36.8  C) (Oral) 18 1.702 m (5' 7\") 95% 33.2 kg/m2       Blood Pressure from Last 3 Encounters:   03/08/18 112/73   03/07/18 111/75   02/21/18 110/72    Weight from Last 3 Encounters:   03/08/18 96.2 kg (212 lb)   03/07/18 97.8 kg (215 lb 8 oz)   02/21/18 96.8 kg (213 lb 8 oz)              Today, you had the following     No orders found for display         Today's Medication Changes          These changes are accurate as of 3/8/18  4:57 PM.  If you have any questions, ask your nurse or doctor.               These medicines have changed or have updated prescriptions.        Dose/Directions    acyclovir 5 % ointment   Commonly known as:  ZOVIRAX   This may have changed:  additional instructions   Used for:  Recurrent cold sores        Apply topically 6 times daily   Quantity:  15 g   Refills:  3       fluticasone 50 MCG/ACT spray   Commonly known as:  FLONASE   This may have changed:    - when to take this  - reasons to take this   Used for:  Seasonal allergic rhinitis        Dose:  1-2 spray   Spray 1-2 sprays into both nostrils daily   Quantity:  16 g   Refills:  5                Primary Care Provider Office Phone # Fax #    Ilene Tristan [phone] [address] [address]        Equal Access to Services     GENNY STONER AH: Gilda weaver " Yifanali, rogelio luqadaha, qalokesh kadonna tinajero, anderson quintanabethany neel. So [facility] [phone].    ATENCIÓN: Si jewel covarrubias, tiene a conti disposición servicios gratuitos de asistencia lingüística. Noel al [phone].    We comply with applicable federal civil rights laws and [place] laws. We do not discriminate on the basis of race, color, national origin, age, disability, sex, sexual orientation, or gender identity.            Thank you!     Thank you for choosing [facility] RHEUMATOLOGY  for your care. Our goal is always to provide you with excellent care. Hearing back from our patients is one way we can continue to improve our services. Please take a few minutes to complete the written survey that you may receive in the mail after your visit with us. Thank you!             Your Updated Medication List - Protect others around you: Learn how to safely use, store and throw away your medicines at www.disposemymeds.org.          This list is accurate as of 3/8/18  4:57 PM.  Always use your most recent med list.                   Brand Name Dispense Instructions for use Diagnosis    acyclovir 400 MG tablet    ZOVIRAX     Take 400 mg by mouth See Admin Instructions 5 times daily as needed for outbreaks        acyclovir 5 % ointment    ZOVIRAX    15 g    Apply topically 6 times daily    Recurrent cold sores       albuterol 108 (90 BASE) MCG/ACT Inhaler    PROAIR HFA/PROVENTIL HFA/VENTOLIN HFA    1 Inhaler    Inhale 2 puffs into the lungs every 6 hours    ILD (interstitial lung disease) (H)       alendronate 70 MG tablet    FOSAMAX    4 tablet    Take 1 tablet (70 mg) by mouth every 7 days    Other osteoporosis without current pathological fracture       atorvastatin 20 MG tablet    LIPITOR    90 tablet    Take 1 tablet (20 mg) by mouth daily    Hyperlipidemia LDL goal <100       * blood glucose monitoring lancets     4 Box    Use to test blood sugar 4 times daily or as directed.    Type 2  diabetes mellitus without complication, without long-term current use of insulin (H)       * blood glucose monitoring lancets     1 Box    Use to test blood sugar 4 times daily or as directed.  Ok to substitute alternative if insurance prefers.    Type 2 diabetes mellitus without complication, without long-term current use of insulin (H)       * blood glucose monitoring test strip    no brand specified    300 strip    Use to test blood sugars 4 times daily or as directed    Type 2 diabetes mellitus without complication, without long-term current use of insulin (H)       * blood glucose monitoring test strip    ACCU-CHEK SHANNON PLUS    120 strip    Use to test blood sugar 4 times daily or as directed.  Ok to substitute alternative if insurance prefers.    Type 2 diabetes mellitus without complication, without long-term current use of insulin (H)       COMPRESSION STOCKINGS     2 each    Wear compression stockings in affected leg (right leg) or both legs most of the time during the day and take them off at night.    DVT (deep venous thrombosis), right, Postphlebitic syndrome, Chronic anticoagulation, Atrial fibrillation (H)       escitalopram 10 MG tablet    LEXAPRO    90 tablet    TAKE 1 TABLET (10 MG) BY MOUTH DAILY    Major depressive disorder, recurrent episode, moderate (H)       fluticasone 220 MCG/ACT Inhaler    FLOVENT HFA    3 Inhaler    Inhale 2 puffs into the lungs 2 times daily    ILD (interstitial lung disease) (H), Follicular bronchiolitis (H)       fluticasone 50 MCG/ACT spray    FLONASE    16 g    Spray 1-2 sprays into both nostrils daily    Seasonal allergic rhinitis       * insulin pen needle 31G X 8 MM    B-D U/F    400 each    Use 4 times daily (with Lantus and Novolog) or as directed.    Type 2 diabetes mellitus without complication, without long-term current use of insulin (H)       * insulin pen needle 32G X 4 MM    BD JANE U/F    200 each    Use 4 daily as directed.    Type 2 diabetes mellitus  without complication, without long-term current use of insulin (H)       ketoconazole 2 % cream    NIZORAL    60 g    Apply topically 2 times daily    Cutaneous candidiasis       LANTUS SOLOSTAR 100 UNIT/ML injection   Generic drug:  insulin glargine     15 mL    INJECT 25 UNTIS SUBCUTANEOUSLY EVERY DAY    Type 2 diabetes mellitus with hyperglycemia, with long-term current use of insulin (H)       lisinopril 2.5 MG tablet    PRINIVIL/Zestril    90 tablet    TAKE 1 TABLET (2.5 MG) BY MOUTH DAILY    Essential hypertension with goal blood pressure less than 140/90       metFORMIN 500 MG 24 hr tablet    GLUCOPHAGE-XR    180 tablet    TAKE 2 TABLETS BY MOUTH DAILY WITH DINNER    Type 2 diabetes mellitus without complication, without long-term current use of insulin (H)       montelukast 10 MG tablet    SINGULAIR    90 tablet    TAKE 1 TABLET BY MOUTH AT BEDTIME    Sjogren's syndrome with lung involvement (H), ILD (interstitial lung disease) (H), Chronic seasonal allergic rhinitis due to other allergen       NovoLOG FLEXPEN 100 UNIT/ML injection   Generic drug:  insulin aspart     15 mL    INJECT 12 UNITS SUBCUTANEOUS 3 TIMES DAILY (WITH MEALS)    Type 2 diabetes mellitus with other specified complication (H)       * order for DME     1 Device    Oxygen: Patient requires supplemental Oxygen 4 LPM via nasal canula with activity. Please provide patient with a home unit and with portability capability. Oxygen will be for a lifetime.    Hypoxia, ILD (interstitial lung disease) (H)       * order for DME     1 each    Equipment being ordered: Full face mask for CPAP - size: F10 large    ANGELICA (obstructive sleep apnea)       potassium chloride SA 20 MEQ CR tablet    K-DUR/KLOR-CON M    180 tablet    Increase KCL to 40 MEQ in AM and 20 MEQ in the evening.    (HFpEF) heart failure with preserved ejection fraction (H)       predniSONE 10 MG tablet    DELTASONE    90 tablet    Take 2 tablets for three days (4/12-14), then take 1  tablet daily (10 mg daily starting 4/15)    ILD (interstitial lung disease) (H), Sjogren's syndrome (H)       spironolactone 25 MG tablet    ALDACTONE    180 tablet    Take 2 tablets (50 mg) by mouth daily    Chronic diastolic congestive heart failure (H)       * TOPROL XL 25 MG 24 hr tablet   Generic drug:  metoprolol succinate     15 tablet    Take 0.5 tablets (12.5 mg) by mouth 2 times daily Take 50 mg by mouth in combination with 12.5 for a total of 62.5 twice a day    (HFpEF) heart failure with preserved ejection fraction (H), Persistent atrial fibrillation (H)       * metoprolol succinate 100 MG 24 hr tablet    TOPROL-XL    90 tablet    Take 50 mg by mouth in combination with 12.5 for a total of 62.5 twice a day    Atrial fibrillation with controlled ventricular response (H)       torsemide 20 MG tablet    DEMADEX    60 tablet    Take 1 tablet (20 mg) by mouth 2 times daily    (HFpEF) heart failure with preserved ejection fraction (H)       traZODone 50 MG tablet    DESYREL    180 tablet    TAKE 1/2-1 TABLET BY MOUTH NIGHTLY AS NEEDED, CAN TITRATE DOWN TO 1/2TAB OR UP TO 2TABS AS NEEDED    Insomnia due to medical condition       TYLENOL 500 MG tablet   Generic drug:  acetaminophen      Take 500 mg by mouth nightly as needed    Dizziness       warfarin 7.5 MG tablet    COUMADIN    90 tablet    TAKE 1 TABLET BY MOUTH DAILY. CURRENT DOSE IS 7.5 MG DAILY. DOSE ADJUSTED PER INR RESULT.    Atrial fibrillation with controlled ventricular response (H)       * Notice:  This list has 10 medication(s) that are the same as other medications prescribed for you. Read the directions carefully, and ask your doctor or other care provider to review them with you.

## 2018-03-08 NOTE — NURSING NOTE
"Chief Complaint   Patient presents with     RECHECK     Sjogrens       Initial /73 (BP Location: Right arm, Patient Position: Sitting, Cuff Size: Adult Large)  Pulse 96  Temp 98.2  F (36.8  C) (Oral)  Resp 18  Ht 1.702 m (5' 7\")  Wt 96.2 kg (212 lb)  SpO2 95%  BMI 33.2 kg/m2 Estimated body mass index is 33.2 kg/(m^2) as calculated from the following:    Height as of this encounter: 1.702 m (5' 7\").    Weight as of this encounter: 96.2 kg (212 lb).  Medication Reconciliation: complete     Kami Mallory [facility]  3/8/2018 3:17 PM        "

## 2018-03-08 NOTE — LETTER
3/8/2018      RE: Betty Tee  [address]  [facility] [address]       Rheumatology Clinic Progress Note      Betty is a 77 year old female presents today for follow up on Sjogren's Syndrome with ILD.     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 She notes pain in left shoulder today that is quite tender to the touch and with decreased ROM. This happens off and on for her and it will usually resolve after 1 day and with use of tylenol. She notes this can occur in her bilateral hips, knees, and elbows as well. She has continued to take 10mg of prednisone daily and attempted taper down to 8mg though symptoms returned. From Sjogren's standpoint, her dry eyes and dry mouth are well managed with drops and lozenges. She notes having shortness of breath with going up 2 flights of stairs though this has been largely stable and she uses oxygen intermittently. Her PFTs were stable on 1/2018.     DEXA scan in 7/2018 showed worsening bone density from previous scan in 2015. We recommended Fosamax as last visit though she did not start this due to concern for side effects. She otherwise reports no fevers/chills, nausea/vomiting, alopecia, rashes, erythematous joints, chest pain, LE edema. She does have diagnosis of permanent afib and follows with cardiology for this.    HPI  Pt was diagnosed with Primary Sjogren Syndrome in 2015 due to borderline +RG, +SSA, and lip biopsy focus score of 1.  Her ILD and joint pain are prednisone-responsive which support the diagnosis.  Ocular staining score was deferred given the other sources of diagnosis.      Past Medical History  Past Medical History:   Diagnosis Date     Alcohol abuse, in remission      Allergic rhinitis, cause unspecified     allegra helps when she takes it     Antiplatelet or antithrombotic long-term use      Atrial fibrillation (H)     in hosp in 11/11 after surgery w/ fluid overload     Cardiomegaly     LVH on stress echo- cardiac w/u at [facility] ER- neg CT scan for PE, neg stress echo in 8/06     Chest pain, unspecified      Disorder of bone and cartilage, unspecified     osteopenia (had been on prempro), improved on 6/06 dexa, stable dexa 11/10     Diverticulosis of colon (without mention of hemorrhage)     last episode yrs ago     Essential hypertension, benign      Gastro-oesophageal reflux disease      Insomnia, unspecified     weaned off clonazepam     Irregular heart beat      Lumbago 7/09    MRI with DJD, now seeing Dr. Cain for sciatic sx's     Major depressive disorder, recurrent episode, moderate (H)      Obstructive sleep apnea      Osteoarthrosis, unspecified whether generalized or localized, unspecified site      Sjogren's syndrome (H)      Sleep apnea      Tobacco use disorder     chantix in 9/07, started again in 6/08, working       Allergies  Allergies   Allergen Reactions     Augmentin Nausea and Vomiting     Codeine Nausea and Vomiting     Phenobarbital Itching     Medications  Current Outpatient Prescriptions   Medication Sig Dispense Refill     metoprolol succinate (TOPROL XL) 25 MG 24 hr tablet Take 0.5 tablets (12.5 mg) by mouth 2 times daily Take 50 mg by mouth in combination with 12.5 for a total of 62.5 twice a day 15 tablet 0     metoprolol succinate (TOPROL-XL) 100 MG 24 hr tablet Take 50 mg by mouth in combination with 12.5 for a total of 62.5 twice a day 90 tablet 3     torsemide (DEMADEX) 20 MG tablet Take 1 tablet (20 mg) by mouth 2 times daily 60 tablet 3     NOVOLOG FLEXPEN 100 UNIT/ML soln  INJECT 12 UNITS SUBCUTANEOUS 3 TIMES DAILY (WITH MEALS) 15 mL 0     potassium chloride SA (K-DUR/KLOR-CON M) 20 MEQ CR tablet Increase KCL to 40 MEQ in AM and 20 MEQ in the evening. 180 tablet 3     escitalopram (LEXAPRO) 10 MG tablet TAKE 1 TABLET (10 MG) BY MOUTH DAILY 90 tablet 0     LANTUS SOLOSTAR 100 UNIT/ML soln INJECT 25 UNTIS SUBCUTANEOUSLY EVERY DAY 15 mL 0     montelukast (SINGULAIR) 10 MG tablet TAKE 1 TABLET BY MOUTH AT BEDTIME 90 tablet 1     lisinopril (PRINIVIL/ZESTRIL) 2.5 MG tablet TAKE 1 TABLET (2.5 MG) BY MOUTH DAILY 90 tablet 0     metFORMIN (GLUCOPHAGE-XR) 500 MG 24 hr tablet TAKE 2 TABLETS BY MOUTH DAILY WITH DINNER 180 tablet 0     warfarin (COUMADIN) 7.5 MG tablet TAKE 1 TABLET BY MOUTH DAILY. CURRENT DOSE IS 7.5 MG DAILY. DOSE ADJUSTED PER INR RESULT. 90 tablet 0     order for DME Equipment being ordered: Full face mask for CPAP - size: F10 large 1 each 0     traZODone (DESYREL) 50 MG tablet TAKE 1/2-1 TABLET BY MOUTH NIGHTLY AS NEEDED, CAN TITRATE DOWN TO 1/2TAB OR UP TO 2TABS AS NEEDED 180 tablet 0     atorvastatin (LIPITOR) 20 MG tablet Take 1 tablet (20 mg) by mouth daily 90 tablet 0     spironolactone (ALDACTONE) 25 MG tablet Take 2 tablets (50 mg) by mouth daily 180 tablet 3     blood glucose monitoring (NO BRAND SPECIFIED) test strip Use to test blood sugars 4 times daily or as directed 300 strip 3     blood glucose monitoring (ACCU-CHEK MULTICLIX) lancets Use to test blood sugar 4 times daily or as directed. 4 Box 3     insulin pen needle (B-D U/F) 31G X 8 MM Use 4 times daily (with Lantus and Novolog) or as directed. 400 each 3     insulin pen needle (BD JANE U/F) 32G X 4 MM Use 4 daily as directed. 200 each 11     blood glucose monitoring (ACCU-CHEK SHANNON PLUS) test strip Use to test blood sugar 4 times daily or as directed.  Ok to substitute alternative if insurance prefers. 120 strip 11     blood glucose monitoring (ACCU-CHEK FASTCLIX) lancets Use to test blood sugar 4 times  daily or as directed.  Ok to substitute alternative if insurance prefers. 1 Box 11     predniSONE (DELTASONE) 10 MG tablet Take 2 tablets for three days (4/12-14), then take 1 tablet daily (10 mg daily starting 4/15) 90 tablet 3     ketoconazole (NIZORAL) 2 % cream Apply topically 2 times daily 60 g 1     albuterol (PROAIR HFA, PROVENTIL HFA, VENTOLIN HFA) 108 (90 BASE) MCG/ACT inhaler Inhale 2 puffs into the lungs every 6 hours 1 Inhaler 3     order for DME Oxygen: Patient requires supplemental Oxygen 4 LPM via nasal canula with activity. Please provide patient with a home unit and with portability capability. Oxygen will be for a lifetime. 1 Device 0     acyclovir (ZOVIRAX) 5 % ointment Apply topically 6 times daily (Patient taking differently: Apply topically 6 times daily As needed for outbreaks) 15 g 3     fluticasone (FLOVENT HFA) 220 MCG/ACT inhaler Inhale 2 puffs into the lungs 2 times daily 3 Inhaler 3     acyclovir (ZOVIRAX) 400 MG tablet Take 400 mg by mouth See Admin Instructions 5 times daily as needed for outbreaks       fluticasone (FLONASE) 50 MCG/ACT nasal spray Spray 1-2 sprays into both nostrils daily (Patient taking differently: Spray 1-2 sprays into both nostrils daily as needed for allergies ) 16 g 5     COMPRESSION STOCKINGS Wear compression stockings in affected leg (right leg) or both legs most of the time during the day and take them off at night. 2 each 2     acetaminophen (TYLENOL) 500 MG tablet Take 500 mg by mouth nightly as needed        alendronate (FOSAMAX) 70 MG tablet Take 1 tablet (70 mg) by mouth every 7 days (Patient not taking: Reported on 1/31/2018) 4 tablet 11     Family History  family history includes Alcohol/Drug in her father; Alzheimer Disease in her father; Asthma in her sister; Breast Cancer in her daughter; C.A.D. (age of onset: 52) in her sister; C.A.D. (age of onset: 63) in her mother; CANCER in her sister and sister; CEREBROVASCULAR DISEASE in her mother; Cancer -  "colorectal (age of onset: 48) in her sister; DIABETES in her sister; Dementia in her father; GASTROINTESTINAL DISEASE in her brother and sister; HEART DISEASE in her mother, sister, and sister; Hyperlipidemia in her brother, father, and mother; Hypertension in her brother, father, mother, sister, and sister; Lipids in her sister and sister; Parkinsonism in her brother; Prostate Cancer in her brother; Prostate Cancer (age of onset: 74) in her brother; Substance Abuse in her brother and sister.  Social History  Social History   Substance Use Topics     Smoking status: Former Smoker     Packs/day: 0.50     Years: 10.00     Types: Cigarettes     Quit date: 8/1/2011     Smokeless tobacco: Never Used      Comment: 1/2 ppd     Alcohol use No      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       ROS  Skin: negative  Eyes: negative  Ears/Nose/Throat: negative  Respiratory: Current upper respiratory symptoms with cough and sputum production.  Uses supplemental oxygen occasionally when going up stairs.   Endocrine: negative  Cardiovascular: well controlled a fib and chf   Gastrointestinal: negative  Genitourinary: negative  Musculoskeletal: left shoulder pain  Neurologic: negative  Psychiatric: negative  Rheumatology: Denies skin changes, joint pain or swelling, or morning stiffness.       Physical ExamBP 112/73 (BP Location: Right arm, Patient Position: Sitting, Cuff Size: Adult Large)  Pulse 96  Temp 98.2  F (36.8  C) (Oral)  Resp 18  Ht 1.702 m (5' 7\")  Wt 96.2 kg (212 lb)  SpO2 95%  BMI 33.2 kg/m2  General: No acute distress. Pleasant and conversational. Accompanied by friend today.   HEENT: No oral ulcers, thrush. Normal salivary pool and dentition. Conj not injected, sclear anicteric   CARD: Irregularly irregular rhythm, normal rate. No murmur, gallop, or rub. No cyanosis or clubbing of extremities.   PULM: Non labored breathing on room air. Lungs clear bilaterally. No wheezes or crackles.   ABD: soft, nontender, " nondistended.   SKIN: Dry skin on lower legs. Small excoriation on back of right calf. No rashes or concerning lesions.   MUSC: no swollen, erythematous or warm joints. Left shoulder with decreased ROM and unable to abduct >90%. Tender to palpation over acromial process of R shoulder  NEURO: Alert and oriented x3, fluent speech, grossly non focal   Psych: Normal Affect     No new labs.       ASSESSMENT and PLAN    1. Sjogren's Syndrome with ILD vs possible IgG4 related disease: Patient with fairly stable symptoms on prednisone 10mg daily. We've tried tapering down multiple times with recurrence in shoulder, hip, knee arthralgias. These arthralgias come and go in asymmetric pattern and resolve after ~24 hours with use of NSAIDS or tylenol. Symptoms resemble palindromic rheumatism. Dry eyes and dry mouth are well managed with drops and lozenges. No recent infections and stable PFTs. She had recent LN biopsy per ENT which was negative for lymphoma, not stained for IgG4.     - Given problems from long-term prednisone (DM, osteoporosis) would consider rituximab for stroid sparing management of arthritis and Sjogren's ILD vs IgG4. We discussed pros and cons today in detail, risk of infection is the main concern given advanced age. Sister Sita will think about this option, and let me know. She will continue on Prednisone 10mg for now.    2. High risk of osteoporosis in a setting of long-term prednisone use, no hx of fracture. Dexa in 2015 wnl but most recent DEXA showed T score of -1.7 (L hip).  - Recommended Fosamax 70mg once per week, patient did not start, hesitant about side effects      RTC in 3 months or sooner        Patient seen in conjunction with Dr Stringer,     Wali Mckeon MD  Internal Medicine, PGY1  Pager: [phone]      Attending Attestation:    I reviewed and edited the above scribed note to reflect my findings and plan.     I discussed the findings and recommendations with the  patient.  Recommendations were discussed with the consulting team.  Time spent: 40 minutes    Carmenza Stringer MD, MS  Rheumatology Attending Physician  pgr [phone]

## 2018-03-08 NOTE — NURSING NOTE
Diet: Patient instructed regarding a heart healthy diet, including discussion of reduced fat and sodium intake. Patient demonstrated understanding of this information and agreed to call with further questions or concerns.  Labs: Patient was given results of the laboratory testing obtained today. Patient was instructed to return for the next laboratory testing in 1 week after changing to Torsemide. Patient demonstrated understanding of this information and agreed to call with further questions or concerns.   New Medication: Patient was educated regarding newly prescribed medication, including discussion of  the indication, administration, side effects, and when to report to MD or RN. Patient demonstrated understanding of this information and agreed to call with further questions or concerns.  Return Appointment: Patient given instructions regarding scheduling next clinic visit. Patient demonstrated understanding of this information and agreed to call with further questions or concerns.  Medication Change: Patient was educated regarding prescribed medication change, including discussion of the indication, administration, side effects, and when to report to MD or RN. Patient demonstrated understanding of this information and agreed to call with further questions or concerns.  Patient stated she understood all health information given and agreed to call with further questions or concerns.   Beatriz Blanco RN

## 2018-03-08 NOTE — PROGRESS NOTES
Called Sister to review changes in medications and when she will start the changes. She initially states Monday she will start fresh with a new weekly pill box and implement changes with Metoprolol and Lasix then. I told her we'd really like her to implement the change from Lasix to Torsemide sooner, as hopefully it might make her feel better. She is agreeable to this and will make the change tomorrow. Labs scheduled for Monday March 19th at 2:00 at [facility].   Beatriz Blanco RN

## 2018-03-08 NOTE — PROGRESS NOTES
Rheumatology Clinic Progress Note      Betty is a 77 year old female presents today for follow up on Sjogren's Syndrome with ILD.     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 She notes pain in left shoulder today that is quite tender to the touch and with decreased ROM. This happens off and on for her and it will usually resolve after 1 day and with use of tylenol. She notes this can occur in her bilateral hips, knees, and elbows as well. She has continued to take 10mg of prednisone daily and attempted taper down to 8mg though symptoms returned. From Sjogren's standpoint, her dry eyes and dry mouth are well managed with drops and lozenges. She notes having shortness of breath with going up 2 flights of stairs though this has been largely stable and she uses oxygen intermittently. Her PFTs were stable on 1/2018.     DEXA scan in 7/2018 showed worsening bone density from previous scan in 2015. We recommended Fosamax as last visit though she did not start this due to concern for side effects. She otherwise reports no fevers/chills, nausea/vomiting, alopecia, rashes, erythematous joints, chest pain, LE edema. She does have diagnosis of permanent afib and follows with cardiology for this.    HPI  Pt was diagnosed with Primary Sjogren Syndrome in 2015 due to borderline +RG, +SSA, and lip biopsy focus score of 1.  Her ILD and joint pain are prednisone-responsive which support the diagnosis. Ocular staining score was deferred given the other sources of diagnosis.      Past  Medical History  Past Medical History:   Diagnosis Date     Alcohol abuse, in remission      Allergic rhinitis, cause unspecified     allegra helps when she takes it     Antiplatelet or antithrombotic long-term use      Atrial fibrillation (H)     in hosp in 11/11 after surgery w/ fluid overload     Cardiomegaly     LVH on stress echo- cardiac w/u at [facility] ER- neg CT scan for PE, neg stress echo in 8/06     Chest pain, unspecified      Disorder of bone and cartilage, unspecified     osteopenia (had been on prempro), improved on 6/06 dexa, stable dexa 11/10     Diverticulosis of colon (without mention of hemorrhage)     last episode yrs ago     Essential hypertension, benign      Gastro-oesophageal reflux disease      Insomnia, unspecified     weaned off clonazepam     Irregular heart beat      Lumbago 7/09    MRI with DJD, now seeing Dr. Cain for sciatic sx's     Major depressive disorder, recurrent episode, moderate (H)      Obstructive sleep apnea      Osteoarthrosis, unspecified whether generalized or localized, unspecified site      Sjogren's syndrome (H)      Sleep apnea      Tobacco use disorder     chantix in 9/07, started again in 6/08, working       Allergies  Allergies   Allergen Reactions     Augmentin Nausea and Vomiting     Codeine Nausea and Vomiting     Phenobarbital Itching     Medications  Current Outpatient Prescriptions   Medication Sig Dispense Refill     metoprolol succinate (TOPROL XL) 25 MG 24 hr tablet Take 0.5 tablets (12.5 mg) by mouth 2 times daily Take 50 mg by mouth in combination with 12.5 for a total of 62.5 twice a day 15 tablet 0     metoprolol succinate (TOPROL-XL) 100 MG 24 hr tablet Take 50 mg by mouth in combination with 12.5 for a total of 62.5 twice a day 90 tablet 3     torsemide (DEMADEX) 20 MG tablet Take 1 tablet (20 mg) by mouth 2 times daily 60 tablet 3     NOVOLOG FLEXPEN 100 UNIT/ML soln INJECT 12 UNITS SUBCUTANEOUS 3 TIMES DAILY (WITH MEALS) 15 mL 0     potassium  chloride SA (K-DUR/KLOR-CON M) 20 MEQ CR tablet Increase KCL to 40 MEQ in AM and 20 MEQ in the evening. 180 tablet 3     escitalopram (LEXAPRO) 10 MG tablet TAKE 1 TABLET (10 MG) BY MOUTH DAILY 90 tablet 0     LANTUS SOLOSTAR 100 UNIT/ML soln INJECT 25 UNTIS SUBCUTANEOUSLY EVERY DAY 15 mL 0     montelukast (SINGULAIR) 10 MG tablet TAKE 1 TABLET BY MOUTH AT BEDTIME 90 tablet 1     lisinopril (PRINIVIL/ZESTRIL) 2.5 MG tablet TAKE 1 TABLET (2.5 MG) BY MOUTH DAILY 90 tablet 0     metFORMIN (GLUCOPHAGE-XR) 500 MG 24 hr tablet TAKE 2 TABLETS BY MOUTH DAILY WITH DINNER 180 tablet 0     warfarin (COUMADIN) 7.5 MG tablet TAKE 1 TABLET BY MOUTH DAILY. CURRENT DOSE IS 7.5 MG DAILY. DOSE ADJUSTED PER INR RESULT. 90 tablet 0     order for DME Equipment being ordered: Full face mask for CPAP - size: F10 large 1 each 0     traZODone (DESYREL) 50 MG tablet TAKE 1/2-1 TABLET BY MOUTH NIGHTLY AS NEEDED, CAN TITRATE DOWN TO 1/2TAB OR UP TO 2TABS AS NEEDED 180 tablet 0     atorvastatin (LIPITOR) 20 MG tablet Take 1 tablet (20 mg) by mouth daily 90 tablet 0     spironolactone (ALDACTONE) 25 MG tablet Take 2 tablets (50 mg) by mouth daily 180 tablet 3     blood glucose monitoring (NO BRAND SPECIFIED) test strip Use to test blood sugars 4 times daily or as directed 300 strip 3     blood glucose monitoring (ACCU-CHEK MULTICLIX) lancets Use to test blood sugar 4 times daily or as directed. 4 Box 3     insulin pen needle (B-D U/F) 31G X 8 MM Use 4 times daily (with Lantus and Novolog) or as directed. 400 each 3     insulin pen needle (BD JANE U/F) 32G X 4 MM Use 4 daily as directed. 200 each 11     blood glucose monitoring (ACCU-CHEK SHANNON PLUS) test strip Use to test blood sugar 4 times daily or as directed.  Ok to substitute alternative if insurance prefers. 120 strip 11     blood glucose monitoring (ACCU-CHEK FASTCLIX) lancets Use to test blood sugar 4 times daily or as directed.  Ok to substitute alternative if insurance prefers. 1 Box  11     predniSONE (DELTASONE) 10 MG tablet Take 2 tablets for three days (4/12-14), then take 1 tablet daily (10 mg daily starting 4/15) 90 tablet 3     ketoconazole (NIZORAL) 2 % cream Apply topically 2 times daily 60 g 1     albuterol (PROAIR HFA, PROVENTIL HFA, VENTOLIN HFA) 108 (90 BASE) MCG/ACT inhaler Inhale 2 puffs into the lungs every 6 hours 1 Inhaler 3     order for DME Oxygen: Patient requires supplemental Oxygen 4 LPM via nasal canula with activity. Please provide patient with a home unit and with portability capability. Oxygen will be for a lifetime. 1 Device 0     acyclovir (ZOVIRAX) 5 % ointment Apply topically 6 times daily (Patient taking differently: Apply topically 6 times daily As needed for outbreaks) 15 g 3     fluticasone (FLOVENT HFA) 220 MCG/ACT inhaler Inhale 2 puffs into the lungs 2 times daily 3 Inhaler 3     acyclovir (ZOVIRAX) 400 MG tablet Take 400 mg by mouth See Admin Instructions 5 times daily as needed for outbreaks       fluticasone (FLONASE) 50 MCG/ACT nasal spray Spray 1-2 sprays into both nostrils daily (Patient taking differently: Spray 1-2 sprays into both nostrils daily as needed for allergies ) 16 g 5     COMPRESSION STOCKINGS Wear compression stockings in affected leg (right leg) or both legs most of the time during the day and take them off at night. 2 each 2     acetaminophen (TYLENOL) 500 MG tablet Take 500 mg by mouth nightly as needed        alendronate (FOSAMAX) 70 MG tablet Take 1 tablet (70 mg) by mouth every 7 days (Patient not taking: Reported on 1/31/2018) 4 tablet 11     Family History  family history includes Alcohol/Drug in her father; Alzheimer Disease in her father; Asthma in her sister; Breast Cancer in her daughter; C.A.D. (age of onset: 52) in her sister; C.A.D. (age of onset: 63) in her mother; CANCER in her sister and sister; CEREBROVASCULAR DISEASE in her mother; Cancer - colorectal (age of onset: 48) in her sister; DIABETES in her sister; Dementia in  "her father; GASTROINTESTINAL DISEASE in her brother and sister; HEART DISEASE in her mother, sister, and sister; Hyperlipidemia in her brother, father, and mother; Hypertension in her brother, father, mother, sister, and sister; Lipids in her sister and sister; Parkinsonism in her brother; Prostate Cancer in her brother; Prostate Cancer (age of onset: 74) in her brother; Substance Abuse in her brother and sister.  Social History  Social History   Substance Use Topics     Smoking status: Former Smoker     Packs/day: 0.50     Years: 10.00     Types: Cigarettes     Quit date: 8/1/2011     Smokeless tobacco: Never Used      Comment: 1/2 ppd     Alcohol use No      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       ROS  Skin: negative  Eyes: negative  Ears/Nose/Throat: negative  Respiratory: Current upper respiratory symptoms with cough and sputum production.  Uses supplemental oxygen occasionally when going up stairs.   Endocrine: negative  Cardiovascular: well controlled a fib and chf   Gastrointestinal: negative  Genitourinary: negative  Musculoskeletal: left shoulder pain  Neurologic: negative  Psychiatric: negative  Rheumatology: Denies skin changes, joint pain or swelling, or morning stiffness.       Physical ExamBP 112/73 (BP Location: Right arm, Patient Position: Sitting, Cuff Size: Adult Large)  Pulse 96  Temp 98.2  F (36.8  C) (Oral)  Resp 18  Ht 1.702 m (5' 7\")  Wt 96.2 kg (212 lb)  SpO2 95%  BMI 33.2 kg/m2  General: No acute distress. Pleasant and conversational. Accompanied by friend today.   HEENT: No oral ulcers, thrush. Normal salivary pool and dentition. Conj not injected, sclear anicteric   CARD: Irregularly irregular rhythm, normal rate. No murmur, gallop, or rub. No cyanosis or clubbing of extremities.   PULM: Non labored breathing on room air. Lungs clear bilaterally. No wheezes or crackles.   ABD: soft, nontender, nondistended.   SKIN: Dry skin on lower legs. Small excoriation on back of right " calf. No rashes or concerning lesions.   MUSC: no swollen, erythematous or warm joints. Left shoulder with decreased ROM and unable to abduct >90%. Tender to palpation over acromial process of R shoulder  NEURO: Alert and oriented x3, fluent speech, grossly non focal   Psych: Normal Affect     No new labs.       ASSESSMENT and PLAN    1. Sjogren's Syndrome with ILD vs possible IgG4 related disease: Patient with fairly stable symptoms on prednisone 10mg daily. We've tried tapering down multiple times with recurrence in shoulder, hip, knee arthralgias. These arthralgias come and go in asymmetric pattern and resolve after ~24 hours with use of NSAIDS or tylenol. Symptoms resemble palindromic rheumatism. Dry eyes and dry mouth are well managed with drops and lozenges. No recent infections and stable PFTs. She had recent LN biopsy per ENT which was negative for lymphoma, not stained for IgG4.     - Given problems from long-term prednisone (DM, osteoporosis) would consider rituximab for stroid sparing management of arthritis and Sjogren's ILD vs IgG4. We discussed pros and cons today in detail, risk of infection is the main concern given advanced age. Sister Sita will think about this option, and let me know. She will continue on Prednisone 10mg for now.    2. High risk of osteoporosis in a setting of long-term prednisone use, no hx of fracture. Dexa in 2015 wnl but most recent DEXA showed T score of -1.7 (L hip).  - Recommended Fosamax 70mg once per week, patient did not start, hesitant about side effects      RTC in 3 months or sooner        Patient seen in conjunction with Dr Stringer,     Wali Mckeon MD  Internal Medicine, PGY1  Pager: [phone]      Attending Attestation:    I reviewed and edited the above scribed note to reflect my findings and plan.     I discussed the findings and recommendations with the patient.  Recommendations were discussed with the consulting team.  Time spent: 40 minutes    [place]  MD Myke, MS  Rheumatology Attending Physician  [facility] [phone]

## 2018-03-15 DIAGNOSIS — I50.30 (HFPEF) HEART FAILURE WITH PRESERVED EJECTION FRACTION (H): ICD-10-CM

## 2018-03-15 DIAGNOSIS — E11.9 TYPE 2 DIABETES MELLITUS WITHOUT COMPLICATION, WITHOUT LONG-TERM CURRENT USE OF INSULIN (H): ICD-10-CM

## 2018-03-15 DIAGNOSIS — I48.19 PERSISTENT ATRIAL FIBRILLATION (H): ICD-10-CM

## 2018-03-15 DIAGNOSIS — E78.5 HYPERLIPIDEMIA LDL GOAL <100: ICD-10-CM

## 2018-03-15 RX ORDER — ATORVASTATIN CALCIUM 20 MG/1
TABLET, FILM COATED ORAL
Qty: 90 TABLET | Refills: 0 | Status: SHIPPED | OUTPATIENT
Start: 2018-03-15 | End: 2018-03-28

## 2018-03-15 RX ORDER — METFORMIN HCL 500 MG
TABLET, EXTENDED RELEASE 24 HR ORAL
Qty: 180 TABLET | Refills: 0 | Status: SHIPPED | OUTPATIENT
Start: 2018-03-15 | End: 2018-06-08

## 2018-03-15 NOTE — TELEPHONE ENCOUNTER
"Prescription approved per [facility] Refill Protocol.  Sophia Hemphill, RN    Requested Prescriptions   Signed Prescriptions Disp Refills     atorvastatin (LIPITOR) 20 MG tablet 90 tablet 0     Sig: TAKE 1 TABLET (20 MG) BY MOUTH DAILY    Statins Protocol Passed    3/15/2018  9:45 AM       Passed - LDL on file in past 12 months    Recent Labs   Lab Test  02/21/18   1230   LDL  5            Passed - No abnormal creatine kinase in past 12 months    Recent Labs   Lab Test  07/24/15   1236   CKT  57               Passed - Recent (12 mo) or future (30 days) visit within the authorizing provider's specialty    Patient had office visit in the last 12 months or has a visit in the next 30 days with authorizing provider or within the authorizing provider's specialty.  See \"Patient Info\" tab in inbasket, or \"Choose Columns\" in Meds & Orders section of the refill encounter.           Passed - Patient is age 18 or older       Passed - No active pregnancy on record       Passed - No positive pregnancy test in past 12 months        metFORMIN (GLUCOPHAGE-XR) 500 MG 24 hr tablet 180 tablet 0     Sig: TAKE 2 TABLETS BY MOUTH DAILY WITH DINNER    Biguanide Agents Passed    3/15/2018  9:45 AM       Passed - Blood pressure less than 140/90 in past 6 months    BP Readings from Last 3 Encounters:   03/08/18 112/73   03/07/18 111/75   02/21/18 110/72                Passed - Patient has documented LDL within the past 12 mos.    Recent Labs   Lab Test  02/21/18   1230   LDL  5            Passed - Patient has had a Microalbumin in the past 12 mos.    Recent Labs   Lab Test  10/06/17   1422   MICROL  14   UMALCR  29.65*            Passed - Patient is age 10 or older       Passed - Patient has documented A1c within the specified period of time.    Recent Labs   Lab Test  01/31/18   1428   A1C  7.0*            Passed - Patient's CR is NOT>1.4 OR Patient's EGFR is NOT<45 within past 12 mos.    Recent Labs   Lab Test  03/07/18   1609   GFRESTIMATED  51* " "  THEOESTBLACK  62       Recent Labs   Lab Test  03/07/18   1609   CR  1.04            Passed - Patient does NOT have a diagnosis of CHF.       Passed - Patient is not pregnant       Passed - Patient has not had a positive pregnancy test within the past 12 mos.        Passed - Recent (6 mo) or future (30 days) visit within the authorizing provider's specialty    Patient had office visit in the last 6 months or has a visit in the next 30 days with authorizing provider or within the authorizing provider's specialty.  See \"Patient Info\" tab in inbasket, or \"Choose Columns\" in Meds & Orders section of the refill encounter.              "

## 2018-03-16 DIAGNOSIS — I50.30 (HFPEF) HEART FAILURE WITH PRESERVED EJECTION FRACTION (H): ICD-10-CM

## 2018-03-16 DIAGNOSIS — I48.19 PERSISTENT ATRIAL FIBRILLATION (H): ICD-10-CM

## 2018-03-16 DIAGNOSIS — J84.9 ILD (INTERSTITIAL LUNG DISEASE) (H): ICD-10-CM

## 2018-03-16 DIAGNOSIS — M35.02 SJOGREN'S SYNDROME WITH LUNG INVOLVEMENT (H): ICD-10-CM

## 2018-03-16 DIAGNOSIS — I48.91 ATRIAL FIBRILLATION WITH CONTROLLED VENTRICULAR RESPONSE (H): ICD-10-CM

## 2018-03-16 RX ORDER — PREDNISONE 10 MG/1
TABLET ORAL
Qty: 90 TABLET | Refills: 3 | Status: SHIPPED | OUTPATIENT
Start: 2018-03-16 | End: 2018-11-28

## 2018-03-16 RX ORDER — METOPROLOL SUCCINATE 25 MG/1
12.5 TABLET, EXTENDED RELEASE ORAL 2 TIMES DAILY
Qty: 30 TABLET | Refills: 3 | Status: SHIPPED | OUTPATIENT
Start: 2018-03-16 | End: 2018-07-09

## 2018-03-19 ENCOUNTER — TELEPHONE (OUTPATIENT)
Dept: FAMILY MEDICINE | Facility: CLINIC | Age: 78
End: 2018-03-19
Payer: MEDICARE

## 2018-03-19 DIAGNOSIS — I50.30 (HFPEF) HEART FAILURE WITH PRESERVED EJECTION FRACTION (H): ICD-10-CM

## 2018-03-19 DIAGNOSIS — Z79.01 LONG TERM CURRENT USE OF ANTICOAGULANT THERAPY: ICD-10-CM

## 2018-03-19 DIAGNOSIS — I48.21 PERMANENT ATRIAL FIBRILLATION (H): Primary | ICD-10-CM

## 2018-03-19 LAB — INR POINT OF CARE: 1.9 (ref 0.86–1.14)

## 2018-03-19 PROCEDURE — 36416 COLLJ CAPILLARY BLOOD SPEC: CPT | Performed by: FAMILY MEDICINE

## 2018-03-19 PROCEDURE — 85610 PROTHROMBIN TIME: CPT | Mod: QW | Performed by: FAMILY MEDICINE

## 2018-03-19 PROCEDURE — 80048 BASIC METABOLIC PNL TOTAL CA: CPT | Performed by: NURSE PRACTITIONER

## 2018-03-19 PROCEDURE — 99207 ZZC NO CHARGE NURSE ONLY: CPT | Performed by: FAMILY MEDICINE

## 2018-03-19 RX ORDER — METOPROLOL SUCCINATE 25 MG/1
TABLET, EXTENDED RELEASE ORAL
Qty: 15 TABLET | Refills: 0 | OUTPATIENT
Start: 2018-03-19

## 2018-03-20 LAB
ANION GAP SERPL CALCULATED.3IONS-SCNC: 6 MMOL/L (ref 3–14)
BUN SERPL-MCNC: 23 MG/DL (ref 7–30)
CALCIUM SERPL-MCNC: 8.7 MG/DL (ref 8.5–10.1)
CHLORIDE SERPL-SCNC: 100 MMOL/L (ref 94–109)
CO2 SERPL-SCNC: 30 MMOL/L (ref 20–32)
CREAT SERPL-MCNC: 1.31 MG/DL (ref 0.52–1.04)
GFR SERPL CREATININE-BSD FRML MDRD: 39 ML/MIN/1.7M2
GLUCOSE SERPL-MCNC: 204 MG/DL (ref 70–99)
POTASSIUM SERPL-SCNC: 4.5 MMOL/L (ref 3.4–5.3)
SODIUM SERPL-SCNC: 136 MMOL/L (ref 133–144)

## 2018-03-21 ENCOUNTER — CARE COORDINATION (OUTPATIENT)
Dept: CARDIOLOGY | Facility: CLINIC | Age: 78
End: 2018-03-21

## 2018-03-21 DIAGNOSIS — I50.30 (HFPEF) HEART FAILURE WITH PRESERVED EJECTION FRACTION (H): Primary | ICD-10-CM

## 2018-03-21 RX ORDER — TORSEMIDE 10 MG/1
30 TABLET ORAL 2 TIMES DAILY
Qty: 180 TABLET | Refills: 3 | Status: SHIPPED | OUTPATIENT
Start: 2018-03-21 | End: 2018-04-11

## 2018-03-21 NOTE — PROGRESS NOTES
"Called Sister Sita to see how she is feeling with the increase in Torsemide and review labs. She said she \"might have committed a sin\" but the Torsemide just wasn't working so she increased it on her own. States she used to have parameters to increase her Lasix so she went off something similar to that. Prescribed dose of Torsemide was 20 mg two times a day.  She hadn't lost any weight. She states starting Sunday she increased herself to 40 mg BID and has gone from 215 lb to 210 lb. She decreased started yesterday to 30 mg two times a day. We discussed the importance of calling us to let us know when her medications aren't working so we can work with her on dosage adjustments. Discussed labs results and that this could be why kidney function is slightly elevated. Has been taking Potassium as prescribed.   Hadn't taken any of her Torsemide yet today, instructed to go back to prescribed 20 mg dose until I could talk to Kiesha Elias for further recommendations.   Beatriz Blanco RN     "

## 2018-03-21 NOTE — PROGRESS NOTES
Date: 3/21/2018    Time of Call: 3:22 PM     Diagnosis:  Heart failure     [ VORB ] Ordering provider: Kiesha Elias NP  Order:  Increase Torsemide to 30 mg BID. Repeat BMP in 1-2 weeks.      Order received by: Beatriz Blanco RN      Follow-up/additional notes: called Sister Sita and notified her of this update.

## 2018-03-26 DIAGNOSIS — J84.9 ILD (INTERSTITIAL LUNG DISEASE) (H): Primary | ICD-10-CM

## 2018-03-28 ENCOUNTER — OFFICE VISIT (OUTPATIENT)
Dept: FAMILY MEDICINE | Facility: CLINIC | Age: 78
End: 2018-03-28
Payer: MEDICARE

## 2018-03-28 ENCOUNTER — TELEPHONE (OUTPATIENT)
Dept: FAMILY MEDICINE | Facility: CLINIC | Age: 78
End: 2018-03-28
Payer: MEDICARE

## 2018-03-28 VITALS
TEMPERATURE: 98.3 F | DIASTOLIC BLOOD PRESSURE: 66 MMHG | WEIGHT: 214.2 LBS | SYSTOLIC BLOOD PRESSURE: 106 MMHG | BODY MASS INDEX: 33.62 KG/M2 | HEART RATE: 91 BPM | HEIGHT: 67 IN | OXYGEN SATURATION: 96 %

## 2018-03-28 DIAGNOSIS — E11.65 TYPE 2 DIABETES MELLITUS WITH HYPERGLYCEMIA, WITH LONG-TERM CURRENT USE OF INSULIN (H): ICD-10-CM

## 2018-03-28 DIAGNOSIS — R53.83 FATIGUE, UNSPECIFIED TYPE: ICD-10-CM

## 2018-03-28 DIAGNOSIS — G25.81 RESTLESS LEGS SYNDROME (RLS): ICD-10-CM

## 2018-03-28 DIAGNOSIS — I48.21 PERMANENT ATRIAL FIBRILLATION (H): ICD-10-CM

## 2018-03-28 DIAGNOSIS — I50.30 (HFPEF) HEART FAILURE WITH PRESERVED EJECTION FRACTION (H): ICD-10-CM

## 2018-03-28 DIAGNOSIS — J84.9 ILD (INTERSTITIAL LUNG DISEASE) (H): ICD-10-CM

## 2018-03-28 DIAGNOSIS — Z79.01 LONG TERM CURRENT USE OF ANTICOAGULANT THERAPY: ICD-10-CM

## 2018-03-28 DIAGNOSIS — Z79.4 TYPE 2 DIABETES MELLITUS WITH HYPERGLYCEMIA, WITH LONG-TERM CURRENT USE OF INSULIN (H): ICD-10-CM

## 2018-03-28 DIAGNOSIS — R59.9 ENLARGED LYMPH NODE: ICD-10-CM

## 2018-03-28 DIAGNOSIS — R19.5 LOOSE STOOLS: Primary | ICD-10-CM

## 2018-03-28 DIAGNOSIS — M35.02 SJOGREN'S SYNDROME WITH LUNG INVOLVEMENT (H): ICD-10-CM

## 2018-03-28 DIAGNOSIS — E78.5 HYPERLIPIDEMIA LDL GOAL <100: ICD-10-CM

## 2018-03-28 DIAGNOSIS — F33.1 MAJOR DEPRESSIVE DISORDER, RECURRENT EPISODE, MODERATE (H): ICD-10-CM

## 2018-03-28 DIAGNOSIS — G47.33 OSA (OBSTRUCTIVE SLEEP APNEA): ICD-10-CM

## 2018-03-28 LAB — INR POINT OF CARE: 1.9 (ref 0.86–1.14)

## 2018-03-28 PROCEDURE — 80048 BASIC METABOLIC PNL TOTAL CA: CPT | Performed by: NURSE PRACTITIONER

## 2018-03-28 PROCEDURE — 85610 PROTHROMBIN TIME: CPT | Mod: QW | Performed by: FAMILY MEDICINE

## 2018-03-28 PROCEDURE — 99207 ZZC NO CHARGE NURSE ONLY: CPT | Performed by: FAMILY MEDICINE

## 2018-03-28 PROCEDURE — 36416 COLLJ CAPILLARY BLOOD SPEC: CPT | Performed by: FAMILY MEDICINE

## 2018-03-28 PROCEDURE — 99214 OFFICE O/P EST MOD 30 MIN: CPT | Performed by: FAMILY MEDICINE

## 2018-03-28 RX ORDER — ATORVASTATIN CALCIUM 10 MG/1
10 TABLET, FILM COATED ORAL DAILY
Qty: 90 TABLET | Refills: 0 | Status: SHIPPED | OUTPATIENT
Start: 2018-03-28 | End: 2018-11-04

## 2018-03-28 NOTE — NURSING NOTE
"Chief Complaint   Patient presents with     Diabetes     Hyperlipidemia     Hypertension       Initial /66  Pulse 91  Temp 98.3  F (36.8  C) (Oral)  Ht 5' 7\" (1.702 m)  Wt 214 lb 3.2 oz (97.2 kg)  SpO2 96%  BMI 33.55 kg/m2 Estimated body mass index is 33.55 kg/(m^2) as calculated from the following:    Height as of this encounter: 5' 7\" (1.702 m).    Weight as of this encounter: 214 lb 3.2 oz (97.2 kg).  Medication Reconciliation: complete      Health Maintenance that is potentially due pending provider review:  NONE    n/a    CHARLES Mcbride  "

## 2018-03-28 NOTE — MR AVS SNAPSHOT
After Visit Summary   3/28/2018    Betty Tee    MRN: [id number]           Patient Information     Date Of Birth          1940        Visit Information        Provider Department      3/28/2018 1:00 PM Ilene Tristan MD [facility]        Today's Diagnoses     Fatigue, unspecified type    -  1    Loose stools        (HFpEF) heart failure with preserved ejection fraction (H)        Permanent atrial fibrillation (H)        Long term current use of anticoagulant therapy        Hyperlipidemia LDL goal <100        Restless legs syndrome (RLS)        ANGELICA (obstructive sleep apnea)        ILD (interstitial lung disease) (H)           Follow-ups after your visit        Additional Services     SLEEP EVALUATION & MANAGEMENT REFERRAL - ADULT -[facility] [phone]  (Age 18 and up)       Please be aware that coverage of these services is subject to the terms and limitations of your health insurance plan.  Call member services at your health plan with any benefit or coverage questions.      Please bring the following to your appointment:    >>   List of current medications   >>   This referral request   >>   Any documents/labs given to you for this referral                      Your next 10 appointments already scheduled     Apr 11, 2018  2:00 PM CDT   Lab with [facility]    Health Lab ([facility])    [address] [place] [address] [id number] [phone]            Apr 11, 2018  2:30 PM CDT   (Arrive by 2:15 PM)   CORE RETURN with LIZY Tyler [facility] ([facility])    [address] [place] [id number] [phone]            Apr 26, 2018  3:00 PM CDT   Lab with [facility]    Health Lab ([facility])    [address] [place] [id number] [phone]            Apr 26, 2018  3:30 PM CDT    (Arrive by 3:15 PM)   RETURN HEART FAILURE with Radha Rosa MD   [facility] ([facility])    [address]  [address]  [place] [address]   [phone]            Jun 07, 2018  4:00 PM CDT   (Arrive by 3:45 PM)   Return Visit with Carmenza Stringer MD   [facility] Rheumatology ([facility])    [address]  [address]  [place] [address]   [phone]            Jul 18, 2018 12:30 PM CDT   SIX MINUTE WALK with UC PFL A, UC PFL 6 MINUTE WALK 1   [facility] Pulmonary Function Testing ([facility])    [address]  3rd [address]  [place] [address]   [phone]            Jul 18, 2018  1:30 PM CDT   (Arrive by 1:15 PM)   Return Interstitial Lung with Meño Walsh MD   [facility] ([facility])    [address]  [address]  [place] [address] [phone]              Future tests that were ordered for you today     Open Future Orders        [facility] Expected Expires Ordered    SLEEP EVALUATION & MANAGEMENT REFERRAL - ADULT -[facility] [phone]  (Age 18 and up) Routine  3/28/2019 3/28/2018            Who to contact     If you have questions or need follow up information about today's clinic visit or your schedule please contact [facility] directly at [phone].  Normal or non-critical lab and imaging results will be communicated to you by MyChart, letter or phone within 4 business days after the clinic has received the results. If you do not hear from us within 7 days, please contact the clinic through MyChart or phone. If you have a critical or abnormal lab result, we will notify you by phone as soon as possible.  Submit refill requests through [employer] or call your pharmacy and they will forward the refill request to us. Please allow 3 business days for your refill to  "be completed.          Additional Information About Your Visit        [facility] Information     [employer] [employer] gives you secure access to your electronic health record. If you see a primary care provider, you can also send messages to your care team and make appointments. If you have questions, please call your primary care clinic.  If you do not have a primary care provider, please call [phone] and they will assist you.        Care EveryWhere ID     This is your Care EveryWhere ID. This could be used by other organizations to access your [place] medical records  [id number]        Your Vitals Were     Pulse Temperature Height Pulse Oximetry BMI (Body Mass Index)       91 98.3  F (36.8  C) (Oral) 5' 7\" (1.702 m) 96% 33.55 kg/m2        Blood Pressure from Last 3 Encounters:   03/28/18 106/66   03/08/18 112/73   03/07/18 111/75    Weight from Last 3 Encounters:   03/28/18 214 lb 3.2 oz (97.2 kg)   03/08/18 212 lb (96.2 kg)   03/07/18 215 lb 8 oz (97.8 kg)              We Performed the Following     Basic metabolic panel     INR point of care          Today's Medication Changes          These changes are accurate as of 3/28/18  2:18 PM.  If you have any questions, ask your nurse or doctor.               These medicines have changed or have updated prescriptions.        Dose/Directions    acyclovir 5 % ointment   Commonly known as:  ZOVIRAX   This may have changed:  additional instructions   Used for:  Recurrent cold sores        Apply topically 6 times daily   Quantity:  15 g   Refills:  3       atorvastatin 10 MG tablet   Commonly known as:  LIPITOR   This may have changed:  See the new instructions.   Used for:  Hyperlipidemia LDL goal <100   Changed by:  Ilene Tristan MD        Dose:  10 mg   Take 1 tablet (10 mg) by mouth daily   Quantity:  90 tablet   Refills:  0       fluticasone 50 MCG/ACT spray   Commonly known as:  FLONASE   This may have changed:    - when to take this  - reasons to take this   Used " for:  Seasonal allergic rhinitis        Dose:  1-2 spray   Spray 1-2 sprays into both nostrils daily   Quantity:  16 g   Refills:  5            Where to get your medicines      These medications were sent to [facility]/pharmacy #[id number] - [place], [place] - [address]  [address] [place] [address] [place] [address]     Phone:  [phone]     atorvastatin 10 MG tablet                Primary Care Provider Office Phone # Fax #    Ilene Tristan [phone] [address] [facility] [address] [address]        Equal Access to Services     [facility]: Hadii aad ku hadasho Soomaali, waaxda luqadaha, qaybta kaalmada adeegyada, anderson smith hayaan adestas mercedes . So [facility] [phone].    ATENCIÓN: Si habla español, tiene a conti disposición servicios gratuitos de asistencia lingüística. [facility] [phone].    We comply with applicable federal civil rights laws and [place] laws. We do not discriminate on the basis of race, color, national origin, age, disability, sex, sexual orientation, or gender identity.            Thank you!     Thank you for choosing [facility]  for your care. Our goal is always to provide you with excellent care. Hearing back from our patients is one way we can continue to improve our services. Please take a few minutes to complete the written survey that you may receive in the mail after your visit with us. Thank you!             Your Updated Medication List - Protect others around you: Learn how to safely use, store and throw away your medicines at www.disposemymeds.org.          This list is accurate as of 3/28/18  2:18 PM.  Always use your most recent med list.                   Brand Name Dispense Instructions for use Diagnosis    acyclovir 400 MG tablet    ZOVIRAX     Take 400 mg by mouth See Admin Instructions 5 times daily as needed for outbreaks        acyclovir 5 % ointment    ZOVIRAX    15 g    Apply topically 6 times daily     Recurrent cold sores       albuterol 108 (90 BASE) MCG/ACT Inhaler    PROAIR HFA/PROVENTIL HFA/VENTOLIN HFA    1 Inhaler    Inhale 2 puffs into the lungs every 6 hours    ILD (interstitial lung disease) (H)       alendronate 70 MG tablet    FOSAMAX    4 tablet    Take 1 tablet (70 mg) by mouth every 7 days    Other osteoporosis without current pathological fracture       atorvastatin 10 MG tablet    LIPITOR    90 tablet    Take 1 tablet (10 mg) by mouth daily    Hyperlipidemia LDL goal <100       * blood glucose monitoring lancets     4 Box    Use to test blood sugar 4 times daily or as directed.    Type 2 diabetes mellitus without complication, without long-term current use of insulin (H)       * blood glucose monitoring lancets     1 Box    Use to test blood sugar 4 times daily or as directed.  Ok to substitute alternative if insurance prefers.    Type 2 diabetes mellitus without complication, without long-term current use of insulin (H)       * blood glucose monitoring test strip    no brand specified    300 strip    Use to test blood sugars 4 times daily or as directed    Type 2 diabetes mellitus without complication, without long-term current use of insulin (H)       * blood glucose monitoring test strip    ACCU-CHEK SHANNON PLUS    120 strip    Use to test blood sugar 4 times daily or as directed.  Ok to substitute alternative if insurance prefers.    Type 2 diabetes mellitus without complication, without long-term current use of insulin (H)       COMPRESSION STOCKINGS     2 each    Wear compression stockings in affected leg (right leg) or both legs most of the time during the day and take them off at night.    DVT (deep venous thrombosis), right, Postphlebitic syndrome, Chronic anticoagulation, Atrial fibrillation (H)       escitalopram 10 MG tablet    LEXAPRO    90 tablet    TAKE 1 TABLET (10 MG) BY MOUTH DAILY    Major depressive disorder, recurrent episode, moderate (H)       fluticasone 220 MCG/ACT Inhaler     FLOVENT HFA    3 Inhaler    Inhale 2 puffs into the lungs 2 times daily    ILD (interstitial lung disease) (H), Follicular bronchiolitis (H)       fluticasone 50 MCG/ACT spray    FLONASE    16 g    Spray 1-2 sprays into both nostrils daily    Seasonal allergic rhinitis       * insulin pen needle 31G X 8 MM    B-D U/F    400 each    Use 4 times daily (with Lantus and Novolog) or as directed.    Type 2 diabetes mellitus without complication, without long-term current use of insulin (H)       * insulin pen needle 32G X 4 MM    BD JANE U/F    200 each    Use 4 daily as directed.    Type 2 diabetes mellitus without complication, without long-term current use of insulin (H)       ketoconazole 2 % cream    NIZORAL    60 g    Apply topically 2 times daily    Cutaneous candidiasis       LANTUS SOLOSTAR 100 UNIT/ML injection   Generic drug:  insulin glargine     15 mL    INJECT 25 UNTIS SUBCUTANEOUSLY EVERY DAY    Type 2 diabetes mellitus with hyperglycemia, with long-term current use of insulin (H)       lisinopril 2.5 MG tablet    PRINIVIL/Zestril    90 tablet    TAKE 1 TABLET (2.5 MG) BY MOUTH DAILY    Essential hypertension with goal blood pressure less than 140/90       metFORMIN 500 MG 24 hr tablet    GLUCOPHAGE-XR    180 tablet    TAKE 2 TABLETS BY MOUTH DAILY WITH DINNER    Type 2 diabetes mellitus without complication, without long-term current use of insulin (H)       * metoprolol succinate 100 MG 24 hr tablet    TOPROL-XL    90 tablet    Take 50 mg by mouth in combination with 12.5 for a total of 62.5 twice a day    Atrial fibrillation with controlled ventricular response (H)       * metoprolol succinate 25 MG 24 hr tablet    TOPROL XL    30 tablet    Take 0.5 tablets (12.5 mg) by mouth 2 times daily Take 50 mg by mouth in combination with 12.5 for a total of 62.5 twice a day    (HFpEF) heart failure with preserved ejection fraction (H), Persistent atrial fibrillation (H)       montelukast 10 MG tablet     SINGULAIR    90 tablet    TAKE 1 TABLET BY MOUTH AT BEDTIME    Sjogren's syndrome with lung involvement (H), ILD (interstitial lung disease) (H), Chronic seasonal allergic rhinitis due to other allergen       NovoLOG FLEXPEN 100 UNIT/ML injection   Generic drug:  insulin aspart     15 mL    INJECT 12 UNITS SUBCUTANEOUS 3 TIMES DAILY (WITH MEALS)    Type 2 diabetes mellitus with other specified complication (H)       * order for DME     1 Device    Oxygen: Patient requires supplemental Oxygen 4 LPM via nasal canula with activity. Please provide patient with a home unit and with portability capability. Oxygen will be for a lifetime.    Hypoxia, ILD (interstitial lung disease) (H)       * order for DME     1 each    Equipment being ordered: Full face mask for CPAP - size: F10 large    ANGELICA (obstructive sleep apnea)       potassium chloride SA 20 MEQ CR tablet    K-DUR/KLOR-CON M    180 tablet    Increase KCL to 40 MEQ in AM and 20 MEQ in the evening.    (HFpEF) heart failure with preserved ejection fraction (H)       predniSONE 10 MG tablet    DELTASONE    90 tablet    Take 2 tablets for three days (4/12-14), then take 1 tablet daily (10 mg daily starting 4/15)    ILD (interstitial lung disease) (H), Sjogren's syndrome with lung involvement (H)       spironolactone 25 MG tablet    ALDACTONE    180 tablet    Take 2 tablets (50 mg) by mouth daily    Chronic diastolic congestive heart failure (H)       torsemide 10 MG tablet    DEMADEX    180 tablet    Take 3 tablets (30 mg) by mouth 2 times daily    (HFpEF) heart failure with preserved ejection fraction (H)       traZODone 50 MG tablet    DESYREL    180 tablet    TAKE 1/2-1 TABLET BY MOUTH NIGHTLY AS NEEDED, CAN TITRATE DOWN TO 1/2TAB OR UP TO 2TABS AS NEEDED    Insomnia due to medical condition       TYLENOL 500 MG tablet   Generic drug:  acetaminophen      Take 500 mg by mouth nightly as needed    Dizziness       warfarin 7.5 MG tablet    COUMADIN    90 tablet    TAKE 1  TABLET BY MOUTH DAILY. CURRENT DOSE IS 7.5 MG DAILY. DOSE ADJUSTED PER INR RESULT.    Atrial fibrillation with controlled ventricular response (H)       * Notice:  This list has 10 medication(s) that are the same as other medications prescribed for you. Read the directions carefully, and ask your doctor or other care provider to review them with you.

## 2018-03-28 NOTE — PROGRESS NOTES
SUBJECTIVE:   Betty Tee is a 77 year old female who presents to clinic today for the following health issues:      Diabetes Follow-up  Patient is checking blood sugars: three times daily.   Blood sugar testing frequency justification: Patient modifying lifestyle changes (diet, exercise) with blood sugars  Results are as follows:         am - 110-130          lunchtime - 150s          suppertime - 150-170    Diabetic concerns: None     Symptoms of hypoglycemia (low blood sugar): none     Paresthesias (numbness or burning in feet) or sores: Yes, mild on and off shooting pain in feet      Date of last diabetic eye exam: 8/24/17    Hyperlipidemia Follow-Up    Rate your low fat/cholesterol diet?: not monitoring fat    Taking statin?  Yes, no muscle aches from statin    Other lipid medications/supplements?:  none    Hypertension Follow-up    Outpatient blood pressures are not being checked.    Low Salt Diet: no added salt      DM- A1c WAS 7.0 two months ago.  Lipids- LDL very low.  Diet has changed with DM dx- less candy, few desserts, more nuts, having breakfast (used to skip- eggs, sometimes broussard or toast, orange/banana/blueberries).    HTN- switched to torsemide at last cardiology visit.  Did get f/u BMP, and kidney worsening.  Will recheck today.    BP Readings from Last 2 Encounters:   03/28/18 106/66   03/08/18 112/73     Hemoglobin A1C (%)   Date Value   01/31/2018 7.0 (H)   10/06/2017 7.3 (H)     LDL Cholesterol Calculated (mg/dL)   Date Value   02/21/2018 5   06/22/2017 13       Amount of exercise or physical activity: None, walks some     Problems taking medications regularly: No    Medication side effects: diarrhea - unsure if due to medications     Diet: regular (no restrictions)      Concerns-   GI- This am, three different fast trips to bathroom, shoots out of her.  Still formed.  Urgency is the main issue.  Going on for awhile.  4-5x/wk will have the urgency, other days has a non-urgent bowel.     Prior to this, bowels were non-urgent, more formed.  Just restarted probiotics this past week- unsure if it's helped.  Sometimes takes kaopectate if going out in the morning- maybe once a week or so.  No constipation afterwards.    Fatigue - no energy to do things.  Not feeling refreshed.  Nodding off during day.    Using CPAP nightly.    Mood- 'not much fun'- has gotten more quiet.  More reflective.  Many stressors.    Did switch away from paxil.    Rheum f/u- rituximab vs prednisone.    Feeling relatively good other than the tiredness.    Brother is dying- Parkinsons, next couple days- last sibling.  Very tough.      Problem list and histories reviewed & adjusted, as indicated.  Additional history: as documented    Patient Active Problem List   Diagnosis     Temporomandibular joint disorder     Diverticulitis of colon     Disorder of bone and cartilage     Osteoarthritis     Alcohol abuse, in remission     Restless legs syndrome (RLS)     Major depressive disorder, recurrent episode, moderate (H)     Essential hypertension with goal blood pressure less than 140/90     Sciatica     Advanced directives, counseling/discussion     Colouterine fistula     Permanent atrial fibrillation (H)     Left ventricular hypertrophy     Health Care Home     Insomnia     Joint pains     Allergic reaction caused by a drug - likely plaquenil     Breast fibroadenoma     DVT (deep venous thrombosis) (H)     Fatty liver disease, nonalcoholic     Rib pain     Neck mass     Gastroesophageal reflux disease without esophagitis     Enlarged lymph node     Sjogren's syndrome with lung involvement (H)     ANGELICA (obstructive sleep apnea)     ILD (interstitial lung disease) (H)     Lower GI bleed     Acute and chronic respiratory failure with hypoxia (H)     (HFpEF) heart failure with preserved ejection fraction (H)     Type 2 diabetes mellitus with hyperglycemia, with long-term current use of insulin (H)     Heart failure, chronic, with acute  decompensation (H)     Hyperlipidemia LDL goal <100     Long term current use of anticoagulant therapy     Inflammatory arthritis      Current Outpatient Prescriptions   Medication Sig Dispense Refill     atorvastatin (LIPITOR) 10 MG tablet Take 1 tablet (10 mg) by mouth daily 90 tablet 0     torsemide (DEMADEX) 10 MG tablet Take 3 tablets (30 mg) by mouth 2 times daily 180 tablet 3     metoprolol succinate (TOPROL XL) 25 MG 24 hr tablet Take 0.5 tablets (12.5 mg) by mouth 2 times daily Take 50 mg by mouth in combination with 12.5 for a total of 62.5 twice a day 30 tablet 3     predniSONE (DELTASONE) 10 MG tablet Take 2 tablets for three days (4/12-14), then take 1 tablet daily (10 mg daily starting 4/15) 90 tablet 3     metFORMIN (GLUCOPHAGE-XR) 500 MG 24 hr tablet TAKE 2 TABLETS BY MOUTH DAILY WITH DINNER 180 tablet 0     metoprolol succinate (TOPROL-XL) 100 MG 24 hr tablet Take 50 mg by mouth in combination with 12.5 for a total of 62.5 twice a day 90 tablet 3     NOVOLOG FLEXPEN 100 UNIT/ML soln INJECT 12 UNITS SUBCUTANEOUS 3 TIMES DAILY (WITH MEALS) 15 mL 0     potassium chloride SA (K-DUR/KLOR-CON M) 20 MEQ CR tablet Increase KCL to 40 MEQ in AM and 20 MEQ in the evening. 180 tablet 3     escitalopram (LEXAPRO) 10 MG tablet TAKE 1 TABLET (10 MG) BY MOUTH DAILY 90 tablet 0     LANTUS SOLOSTAR 100 UNIT/ML soln INJECT 25 UNTIS SUBCUTANEOUSLY EVERY DAY 15 mL 0     montelukast (SINGULAIR) 10 MG tablet TAKE 1 TABLET BY MOUTH AT BEDTIME 90 tablet 1     lisinopril (PRINIVIL/ZESTRIL) 2.5 MG tablet TAKE 1 TABLET (2.5 MG) BY MOUTH DAILY 90 tablet 0     order for DME Equipment being ordered: Full face mask for CPAP - size: F10 large 1 each 0     traZODone (DESYREL) 50 MG tablet TAKE 1/2-1 TABLET BY MOUTH NIGHTLY AS NEEDED, CAN TITRATE DOWN TO 1/2TAB OR UP TO 2TABS AS NEEDED 180 tablet 0     spironolactone (ALDACTONE) 25 MG tablet Take 2 tablets (50 mg) by mouth daily 180 tablet 3     blood glucose monitoring (NO BRAND  SPECIFIED) test strip Use to test blood sugars 4 times daily or as directed 300 strip 3     blood glucose monitoring (ACCU-CHEK MULTICLIX) lancets Use to test blood sugar 4 times daily or as directed. 4 Box 3     insulin pen needle (B-D U/F) 31G X 8 MM Use 4 times daily (with Lantus and Novolog) or as directed. 400 each 3     insulin pen needle (BD JANE U/F) 32G X 4 MM Use 4 daily as directed. 200 each 11     blood glucose monitoring (ACCU-CHEK SHANNON PLUS) test strip Use to test blood sugar 4 times daily or as directed.  Ok to substitute alternative if insurance prefers. 120 strip 11     blood glucose monitoring (ACCU-CHEK FASTCLIX) lancets Use to test blood sugar 4 times daily or as directed.  Ok to substitute alternative if insurance prefers. 1 Box 11     ketoconazole (NIZORAL) 2 % cream Apply topically 2 times daily 60 g 1     albuterol (PROAIR HFA, PROVENTIL HFA, VENTOLIN HFA) 108 (90 BASE) MCG/ACT inhaler Inhale 2 puffs into the lungs every 6 hours 1 Inhaler 3     order for DME Oxygen: Patient requires supplemental Oxygen 4 LPM via nasal canula with activity. Please provide patient with a home unit and with portability capability. Oxygen will be for a lifetime. 1 Device 0     acyclovir (ZOVIRAX) 5 % ointment Apply topically 6 times daily (Patient taking differently: Apply topically 6 times daily As needed for outbreaks) 15 g 3     fluticasone (FLOVENT HFA) 220 MCG/ACT inhaler Inhale 2 puffs into the lungs 2 times daily 3 Inhaler 3     acyclovir (ZOVIRAX) 400 MG tablet Take 400 mg by mouth See Admin Instructions 5 times daily as needed for outbreaks       fluticasone (FLONASE) 50 MCG/ACT nasal spray Spray 1-2 sprays into both nostrils daily (Patient taking differently: Spray 1-2 sprays into both nostrils daily as needed for allergies ) 16 g 5     COMPRESSION STOCKINGS Wear compression stockings in affected leg (right leg) or both legs most of the time during the day and take them off at night. 2 each 2      acetaminophen (TYLENOL) 500 MG tablet Take 500 mg by mouth nightly as needed        BASAGLAR 100 UNIT/ML injection Inject 25 Units Subcutaneous daily 7.5 mL 2     warfarin (COUMADIN) 7.5 MG tablet TAKE 1 TABLET BY MOUTH DAILY. CURRENT DOSE IS 7.5 MG DAILY. DOSE ADJUSTED PER INR RESULT. 90 tablet 0     alendronate (FOSAMAX) 70 MG tablet Take 1 tablet (70 mg) by mouth every 7 days 4 tablet 11     Allergies   Allergen Reactions     Augmentin Nausea and Vomiting     Codeine Nausea and Vomiting     Phenobarbital Itching     Recent Labs   Lab Test  03/28/18   1421  03/19/18   1418   02/21/18   1230   01/31/18   1428  11/08/17   1432   10/06/17   1421   08/01/17   1146  07/05/17   1233   06/22/17   1700   04/02/17   2147   09/20/16   1045   A1C   --    --    --    --    --   7.0*   --    --   7.3*   --    --   6.4*   --    --    < >   --    < >   --    LDL   --    --    --   5   --    --    --    --    --    --    --    --    --   13   --    --    --   56   HDL   --    --    --   64   --    --    --    --    --    --    --    --    --   62   --    --    --   72   TRIG   --    --    --   188*   --    --    --    --    --    --    --    --    --   132   --    --    --   123   ALT   --    --    --    --    --    --    --    --   43   --   45   --    --    --    --   19   < >   --    CR  0.99  1.31*   < >  0.88   < >  0.96   --    < >  0.94   < >  0.90  0.82   < >  0.80   < >  0.58   < >   --    GFRESTIMATED  54*  39*   < >  62   < >  56*   --    < >  58*   < >  61  68   < >  69   < >  >90  Non  GFR Calc     < >   --    GFRESTBLACK  66  48*   < >  76   < >  68   --    < >  70   < >  74  82   < >  84   < >  >90   GFR Calc     < >   --    POTASSIUM  3.7  4.5   < >  3.9   < >  4.5   --    < >  4.3   < >  3.5  3.7   < >  3.0*   < >  3.3*   < >   --    TSH   --    --    --    --    --    --   1.40   --    --    --    --    --    --    --    --    --    --    --     < > = values in this interval not  "displayed.      BP Readings from Last 3 Encounters:   03/28/18 106/66   03/08/18 112/73   03/07/18 111/75    Wt Readings from Last 3 Encounters:   03/28/18 214 lb 3.2 oz (97.2 kg)   03/08/18 212 lb (96.2 kg)   03/07/18 215 lb 8 oz (97.8 kg)            Labs reviewed in EPIC    Reviewed and updated as needed this visit by clinical staff  Tobacco  Allergies  Meds  Problems       Reviewed and updated as needed this visit by Provider  Allergies  Meds  Problems         ROS:  Constitutional, HEENT, cardiovascular, pulmonary, gi and gu systems are negative, except as otherwise noted.    OBJECTIVE:     /66  Pulse 91  Temp 98.3  F (36.8  C) (Oral)  Ht 5' 7\" (1.702 m)  Wt 214 lb 3.2 oz (97.2 kg)  SpO2 96%  BMI 33.55 kg/m2  Body mass index is 33.55 kg/(m^2).  GENERAL APPEARANCE: healthy, alert and no distress     EYES: PERRL, sclera clear     HENT: nose and mouth without ulcers or lesions     NECK: no adenopathy, no asymmetry, masses, or scars and thyroid normal to palpation     RESP: lungs clear to auscultation - no rales, rhonchi or wheezes     CV: irregular rhythm, normal rate, normal S1 S2, no S3 or S4 and no murmur, click or rub      Abdomen: soft, nontender, no HSM or masses and bowel sounds normal          Diagnostic Test Results:  Results for orders placed or performed in visit on 03/28/18   Basic metabolic panel   Result Value Ref Range    Sodium 137 133 - 144 mmol/L    Potassium 3.7 3.4 - 5.3 mmol/L    Chloride 101 94 - 109 mmol/L    Carbon Dioxide 29 20 - 32 mmol/L    Anion Gap 7 3 - 14 mmol/L    Glucose 142 (H) 70 - 99 mg/dL    Urea Nitrogen 21 7 - 30 mg/dL    Creatinine 0.99 0.52 - 1.04 mg/dL    GFR Estimate 54 (L) >60 mL/min/1.7m2    GFR Estimate If Black 66 >60 mL/min/1.7m2    Calcium 9.1 8.5 - 10.1 mg/dL     *Note: Due to a large number of results and/or encounters for the requested time period, some results have not been displayed. A complete set of results can be found in Results Review. "       ASSESSMENT/PLAN:       ICD-10-CM    1. Loose stools R19.5    2. Fatigue, unspecified type R53.83    3. NAGELICA (obstructive sleep apnea) G47.33 SLEEP EVALUATION & MANAGEMENT REFERRAL - [facility] [phone]  (Age 18 and up)   4. Major depressive disorder, recurrent episode, moderate (H) F33.1    5. Sjogren's syndrome with lung involvement (H) M35.02    6. (HFpEF) heart failure with preserved ejection fraction (H) I50.30 Basic metabolic panel   7. Permanent atrial fibrillation (H) I48.2 INR point of care   8. Long term current use of anticoagulant therapy Z79.01 INR point of care   9. Hyperlipidemia LDL goal <100 E78.5 atorvastatin (LIPITOR) 10 MG tablet   10. Restless legs syndrome (RLS) G25.81 SLEEP EVALUATION & MANAGEMENT REFERRAL - [facility] [phone]  (Age 18 and up)   11. ILD (interstitial lung disease) (H) J84.9    12. Type 2 diabetes mellitus with hyperglycemia, with long-term current use of insulin (H) E11.65     Z79.4    13. Enlarged lymph node R59.9      Loose/urgent stools- formed, multiple mornings a week, last couple months, no other GI sx's.  No significant medication changes that would affect GI system.  Has just restarted probiotics.  Occasional kaopectate use if going out in the morning.    Will have her continue on the probiotics and see if sx's improve.  RTC if symptoms persist or worsen.     Fatigue/ANGELICA/RLS- fatigue and sleepiness, unsure cause, could be from her multiple medical conditions.  She does have ANGELICA, on nightly bipap, and looks like last f/u was almost 2 yrs ago (6mo f/u recommended at that time).  Will have her make an appt and bring her machine.  Do note that her ferritin was at 18 in 5/17, and 39 in 4/16.  Unsure if she is taking the ferritin recommended in '16.    MDD- worse mood lately, but likely affected by her last remaining brother actively dying now.  Did switch from long-term paxil to lexapro recently, so  will continue to monitor to see if adjustments are needed.    Cardiac- due for INR early next week- pt would like to check today while here.  At recent cardiology visit, pt was switched to torsemide, and kidney function test afterwards worsened.  Will recheck today.    Lipids- LDL low at 13 in 6/17, so lipitor lowered from 40mg/d to 20mg/d.  LDL on recheck even lower at 5 in 2/18.  Will lower lipitor to 10mg/d, and recheck at her f/u visit in 2 months.  Suspect it could be due to her improved diet since DM dx.    DM- doing well with A1C of 7.0 a couple months ago- likely recheck at next visit.    Enlarged lymph node- negative bx in 2/18.    F/u in 2 months, sooner if issues.        Ilene Tristan MD  [facility]

## 2018-03-28 NOTE — TELEPHONE ENCOUNTER
ANTICOAGULATION FOLLOW-UP CLINIC VISIT    Patient Name:  Betty Tee  Date:  3/28/2018  Contact Type:  Face to Face- left detailed message    SUBJECTIVE:  Bleeding Signs/Symptoms: None  Thromboembolic Signs/Symptoms: None    Medication Changes:  No  Dietary Changes:  No  Bacterial/Viral Infection:  No    Missed Coumadin Doses:  None  Other Concerns:  No      ASSESSMENT/PLAN:  See: ANTICOAGULATION QIC flow sheet.    MEDICATION MANAGEMENT  Education Provided:    INR: 1.9  Dose: 10 mg today then 7.5 mg qd and recheck in 2-3 weeks  Dose instructions left on VM. Asked her to call is any questions or concerns.    Dosage adjustment made based on physician directed care plan.    JIMI VOGT

## 2018-03-29 DIAGNOSIS — I48.91 ATRIAL FIBRILLATION WITH CONTROLLED VENTRICULAR RESPONSE (H): ICD-10-CM

## 2018-03-29 LAB
ANION GAP SERPL CALCULATED.3IONS-SCNC: 7 MMOL/L (ref 3–14)
BUN SERPL-MCNC: 21 MG/DL (ref 7–30)
CALCIUM SERPL-MCNC: 9.1 MG/DL (ref 8.5–10.1)
CHLORIDE SERPL-SCNC: 101 MMOL/L (ref 94–109)
CO2 SERPL-SCNC: 29 MMOL/L (ref 20–32)
CREAT SERPL-MCNC: 0.99 MG/DL (ref 0.52–1.04)
GFR SERPL CREATININE-BSD FRML MDRD: 54 ML/MIN/1.7M2
GLUCOSE SERPL-MCNC: 142 MG/DL (ref 70–99)
POTASSIUM SERPL-SCNC: 3.7 MMOL/L (ref 3.4–5.3)
SODIUM SERPL-SCNC: 137 MMOL/L (ref 133–144)

## 2018-03-29 ASSESSMENT — PATIENT HEALTH QUESTIONNAIRE - PHQ9: SUM OF ALL RESPONSES TO PHQ QUESTIONS 1-9: 6

## 2018-03-30 RX ORDER — WARFARIN SODIUM 7.5 MG/1
TABLET ORAL
Qty: 90 TABLET | Refills: 0 | Status: SHIPPED | OUTPATIENT
Start: 2018-03-30 | End: 2018-04-26

## 2018-03-30 NOTE — TELEPHONE ENCOUNTER
Prescription approved per [facility] [place].  Lydia HALEY RN

## 2018-03-30 NOTE — TELEPHONE ENCOUNTER
"Requested Prescriptions   Pending Prescriptions Disp Refills     warfarin (COUMADIN) 7.5 MG tablet [Pharmacy Med Name: WARFARIN SODIUM 7.5 MG TABLET] 90 tablet 0     Sig: TAKE 1 TABLET BY MOUTH DAILY. CURRENT DOSE IS 7.5 MG DAILY. DOSE ADJUSTED PER INR RESULT.    Vitamin K Antagonists Failed    3/29/2018  8:29 PM       Failed - INR is within goal in the past 6 weeks    Confirm INR is within goal in the past 6 weeks.     Recent Labs   Lab Test 03/28/18   INR  1.9*                      Passed - Recent (12 mo) or future (30 days) visit within the authorizing provider's specialty    Patient had office visit in the last 12 months or has a visit in the next 30 days with authorizing provider or within the authorizing provider's specialty.  See \"Patient Info\" tab in inbasket, or \"Choose Columns\" in Meds & Orders section of the refill encounter.           Passed - Patient is 18 years of age or older       Passed - Patient is not pregnant       Passed - No positive pregnancy on file in past 12 months        "

## 2018-04-03 ENCOUNTER — TELEPHONE (OUTPATIENT)
Dept: FAMILY MEDICINE | Facility: CLINIC | Age: 78
End: 2018-04-03

## 2018-04-03 DIAGNOSIS — Z79.4 TYPE 2 DIABETES MELLITUS WITH HYPERGLYCEMIA, WITH LONG-TERM CURRENT USE OF INSULIN (H): Primary | ICD-10-CM

## 2018-04-03 DIAGNOSIS — E11.65 TYPE 2 DIABETES MELLITUS WITH HYPERGLYCEMIA, WITH LONG-TERM CURRENT USE OF INSULIN (H): Primary | ICD-10-CM

## 2018-04-03 RX ORDER — INSULIN GLARGINE 100 [IU]/ML
25 INJECTION, SOLUTION SUBCUTANEOUS DAILY
Qty: 7.5 ML | Refills: 2 | Status: SHIPPED | OUTPATIENT
Start: 2018-04-03 | End: 2019-06-18

## 2018-04-03 NOTE — TELEPHONE ENCOUNTER
Lantus rx switched to basaglar as previously discussed (see 2/1/18 TE) - rx sent for basaglar.    Please let pt know.  Thanks-CW

## 2018-04-03 NOTE — TELEPHONE ENCOUNTER
Reason for Call:  Medication or medication refill:    Do you use a Likely Pharmacy?  Name of the pharmacy and phone number for the current request:  [facility]/PHARMACY #[id number] - [place], [place] - [address]       Name of the medication requested: INSURANCE WILL ONLY COVER BASAGLAR OR LEVEMIR OR TRESIBA    Other request: INSURANCE WONT COVER LANTIS    Can we leave a detailed message on this number? YES    Phone number patient can be reached at: Other phone number:      Best Time:     Call taken on 4/3/2018 at 9:24 AM by Franklin Hair

## 2018-04-04 ENCOUNTER — PRE VISIT (OUTPATIENT)
Dept: CARDIOLOGY | Facility: CLINIC | Age: 78
End: 2018-04-04

## 2018-04-04 DIAGNOSIS — I50.30 (HFPEF) HEART FAILURE WITH PRESERVED EJECTION FRACTION (H): Primary | ICD-10-CM

## 2018-04-04 NOTE — TELEPHONE ENCOUNTER
"Informed pt below and from 2/1/18 notes.  Radha Otero RN      2/1/18 TE notes:  Yes - 1:1 - easiest to think of Basaglar as a \"generic Lantus\"   Sabrina Meek, PharmD, JAMES, BCACP  [facility] Pharmacist, [facility]      "

## 2018-04-11 ENCOUNTER — OFFICE VISIT (OUTPATIENT)
Dept: CARDIOLOGY | Facility: CLINIC | Age: 78
End: 2018-04-11
Attending: NURSE PRACTITIONER
Payer: MEDICARE

## 2018-04-11 ENCOUNTER — RADIANT APPOINTMENT (OUTPATIENT)
Dept: GENERAL RADIOLOGY | Facility: CLINIC | Age: 78
End: 2018-04-11
Payer: MEDICARE

## 2018-04-11 VITALS
WEIGHT: 211 LBS | SYSTOLIC BLOOD PRESSURE: 118 MMHG | OXYGEN SATURATION: 92 % | DIASTOLIC BLOOD PRESSURE: 77 MMHG | BODY MASS INDEX: 33.12 KG/M2 | HEIGHT: 67 IN | HEART RATE: 99 BPM

## 2018-04-11 DIAGNOSIS — I50.30 (HFPEF) HEART FAILURE WITH PRESERVED EJECTION FRACTION (H): ICD-10-CM

## 2018-04-11 DIAGNOSIS — I48.21 PERMANENT ATRIAL FIBRILLATION (H): ICD-10-CM

## 2018-04-11 DIAGNOSIS — R06.02 SHORTNESS OF BREATH: ICD-10-CM

## 2018-04-11 DIAGNOSIS — R06.02 SHORTNESS OF BREATH: Primary | ICD-10-CM

## 2018-04-11 DIAGNOSIS — Z79.01 LONG TERM CURRENT USE OF ANTICOAGULANT THERAPY: ICD-10-CM

## 2018-04-11 LAB
ANION GAP SERPL CALCULATED.3IONS-SCNC: 7 MMOL/L (ref 3–14)
BUN SERPL-MCNC: 21 MG/DL (ref 7–30)
CALCIUM SERPL-MCNC: 9.5 MG/DL (ref 8.5–10.1)
CHLORIDE SERPL-SCNC: 99 MMOL/L (ref 94–109)
CO2 SERPL-SCNC: 29 MMOL/L (ref 20–32)
CREAT SERPL-MCNC: 0.92 MG/DL (ref 0.52–1.04)
CRP SERPL-MCNC: 51.5 MG/L (ref 0–8)
GFR SERPL CREATININE-BSD FRML MDRD: 59 ML/MIN/1.7M2
GLUCOSE SERPL-MCNC: 145 MG/DL (ref 70–99)
INR PPP: 2.01 (ref 0.86–1.14)
NT-PROBNP SERPL-MCNC: 804 PG/ML (ref 0–450)
POTASSIUM SERPL-SCNC: 4.3 MMOL/L (ref 3.4–5.3)
SODIUM SERPL-SCNC: 135 MMOL/L (ref 133–144)

## 2018-04-11 PROCEDURE — 99214 OFFICE O/P EST MOD 30 MIN: CPT | Performed by: NURSE PRACTITIONER

## 2018-04-11 PROCEDURE — 36415 COLL VENOUS BLD VENIPUNCTURE: CPT | Performed by: NURSE PRACTITIONER

## 2018-04-11 PROCEDURE — 80048 BASIC METABOLIC PNL TOTAL CA: CPT | Performed by: NURSE PRACTITIONER

## 2018-04-11 PROCEDURE — 86140 C-REACTIVE PROTEIN: CPT | Performed by: NURSE PRACTITIONER

## 2018-04-11 PROCEDURE — 85610 PROTHROMBIN TIME: CPT | Performed by: NURSE PRACTITIONER

## 2018-04-11 PROCEDURE — G0463 HOSPITAL OUTPT CLINIC VISIT: HCPCS | Mod: ZF

## 2018-04-11 PROCEDURE — 83880 ASSAY OF NATRIURETIC PEPTIDE: CPT | Performed by: NURSE PRACTITIONER

## 2018-04-11 RX ORDER — TORSEMIDE 10 MG/1
TABLET ORAL
Qty: 180 TABLET | Refills: 3 | Status: SHIPPED | OUTPATIENT
Start: 2018-04-11 | End: 2018-04-11

## 2018-04-11 RX ORDER — TORSEMIDE 20 MG/1
TABLET ORAL
Qty: 105 TABLET | Refills: 3 | Status: SHIPPED | OUTPATIENT
Start: 2018-04-11 | End: 2018-10-08

## 2018-04-11 ASSESSMENT — PAIN SCALES - GENERAL: PAINLEVEL: NO PAIN (0)

## 2018-04-11 NOTE — PATIENT INSTRUCTIONS
"You were seen today in the Cardiovascular Clinic at the [facility].     Cardiology Providers you saw during your visit: Kiesha MEDEL CNP      Follow up and medication changes:    1. Increase Torsemide to 40 mg in the morning, 30 mg in the afternoon  2. Repeat Basic Metabolic Panel lab in one week - 1:00 at [facility].   3. Chest xray ordered, please get as soon as convenient.   4. Follow up with Dr Rosa on  as scheduled.     Results for JEAN KENNEDY (MRN [id number]) as of 2018 14:29   Ref. Range 3/28/2018 14:21   Sodium Latest Ref Range: 133 - 144 mmol/L 137   Potassium Latest Ref Range: 3.4 - 5.3 mmol/L 3.7   Chloride Latest Ref Range: 94 - 109 mmol/L 101   Carbon Dioxide Latest Ref Range: 20 - 32 mmol/L 29   Urea Nitrogen Latest Ref Range: 7 - 30 mg/dL 21   Creatinine Latest Ref Range: 0.52 - 1.04 mg/dL 0.99   GFR Estimate Latest Ref Range: >60 mL/min/1.7m2 54 (L)   GFR Estimate If Black Latest Ref Range: >60 mL/min/1.7m2 66   Calcium Latest Ref Range: 8.5 - 10.1 mg/dL 9.1   Anion Gap Latest Ref Range: 3 - 14 mmol/L 7   Glucose Latest Ref Range: 70 - 99 mg/dL 142 (H)       Please limit your fluid intake to 2 L (68 ounces) daily.  2 Liters a day = 8.5 cups, or 68 ounces.  Please limit your salt intake to 2 grams a day or less.     If you gain 2# in 24 hours or 5# in one week call Beatriz Blanco RN so we can adjust your medications as needed over the phone.     Please feel free to call me with any questions or concerns.       Beatriz Blanco RN  [facility]  Cardiology Care Coordinator-Heart Failure Clinic     Questions and [phone].   First press #1 for the Share[employer] and then press #3 for \"Medical Questions\" to reach us Cardiology Nurses.      On Call Cardiologist for after hours or on weekends: [phone]   option #4 and ask to speak to the on-call Cardiologist. Inform them you are a CORE/heart failure patient at the " [facility].           If you need a medication refill please contact your pharmacy.  Please allow 3 business days for your refill to be completed.  _______________________________________________________  [facility]IN[facility] Cardiomyopathy, Optimization, Rehabilitation, Education   The [facility] CLINIC is a heart failure specialty clinic within the [facility] where you will work with specialized nurse practitioners dedicated to helping patients with heart failure carefully adjust medications, receive education, and learn who and when to call if symptoms develop. They specialize in helping you better understand your condition, slow the progression of your disease, improve the length and quality of your life, help you detect future heart problems before they become life threatening, and avoid hospitalizations.  As always, thank you for trusting us with your health care needs!

## 2018-04-11 NOTE — LETTER
4/11/2018      RE: Betty Tee  [address]  [facility] [address]       Dear Colleague,    Thank you for the opportunity to participate in the care of your patient, Betty Tee, at the [facility] at [facility]. Please see a copy of my visit note below.    HPI:   Ms. 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  She is using her oxygen for long distances at [facility]. She presents to [facility] for routine follow up.     She continues to complain of abdominal distention and worsening KIMBLE. She continues to use her oxygen at [facility] with strenuous activity only, but has it in clinic today and does not normally bring it. She notes adequate urine output when she increases her Torsemide for a short period of time, but then returns to prior a few days after the dose change. She notes improvement in dizziness with the recent change in Toprol XL. She denies fever, chills, chest pain, palpitations, PND, orthopnea, nausea, vomiting, diarrhea, hematochezia, melena, or LE edema. Her weight is around 211-213 lbs. She continues to follow a low sodium diet.      PAST MEDICAL HISTORY:  Past Medical History:   Diagnosis Date     Alcohol abuse, in remission      Allergic rhinitis, cause unspecified     allegra helps when she takes it     Antiplatelet or antithrombotic long-term use      Atrial fibrillation (H)     in hosp in 11/11 after surgery w/ fluid overload     Cardiomegaly     LVH on stress echo- cardiac w/u at [facility] ER- neg CT scan for PE, neg stress echo in 8/06     Chest pain, unspecified      Disorder of bone and cartilage, unspecified     osteopenia (had been on prempro), improved on 6/06 dexa, stable dexa 11/10      Diverticulosis of colon (without mention of hemorrhage)     last episode yrs ago     Essential hypertension, benign      Gastro-oesophageal reflux disease      Insomnia, unspecified     weaned off clonazepam     Irregular heart beat      Lumbago 7/09    MRI with NEAL, now seeing Dr. Cain for sciatic sx's     Major depressive disorder, recurrent episode, moderate (H)      Obstructive sleep apnea      Osteoarthrosis, unspecified whether generalized or localized, unspecified site      Sjogren's syndrome (H)      Sleep apnea      Tobacco use disorder     chantix in 9/07, started again in 6/08, working       FAMILY HISTORY:  Family History   Problem Relation Age of Onset     C.A.D. Mother 63     MI- first at age 63     HEART DISEASE Mother      Hypertension Mother      CEREBROVASCULAR DISEASE Mother      Hyperlipidemia Mother      Alcohol/Drug Father      Alzheimer Disease Father      Dementia Father      Hypertension Father      Hyperlipidemia Father      C.A.D. Sister 52     Minor MI- age 50's     HEART DISEASE Sister      Hypertension Sister      Hypertension Sister      Hypertension Brother      Cancer - colorectal Sister 48     Late 40's early 50's     Prostate Cancer Brother 74     Dx'd age 74     GASTROINTESTINAL DISEASE Sister      Diverticulitis     GASTROINTESTINAL DISEASE Brother      Diverticulitis     Lipids Sister      Lipids Sister      Parkinsonism Brother      DIABETES Sister      HEART DISEASE Sister      CHF     CANCER Sister      lung, smoker     Substance Abuse Sister      Substance Abuse Brother      Asthma Sister      CANCER Sister      Breast Cancer Daughter      Prostate Cancer Brother      Hyperlipidemia Brother        SOCIAL HISTORY:  Social History     Social History     Marital status: Single     Spouse name: N/A     Number of children: 0     Years of education: Ed Spec De     Occupational History     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5 MG tablet TAKE 1 TABLET BY MOUTH DAILY. CURRENT DOSE IS 7.5 MG DAILY. DOSE ADJUSTED PER INR RESULT. 90 tablet 0     atorvastatin (LIPITOR) 10 MG tablet Take 1 tablet (10 mg) by mouth daily 90 tablet 0     metoprolol succinate (TOPROL XL) 25 MG 24 hr tablet Take 0.5 tablets (12.5 mg) by mouth 2 times daily Take 50 mg by mouth in combination with 12.5 for a total of 62.5 twice a day 30 tablet 3     predniSONE (DELTASONE) 10 MG tablet Take 2 tablets for three days (4/12-14), then take 1 tablet daily (10 mg daily starting 4/15) 90 tablet 3     metFORMIN (GLUCOPHAGE-XR) 500 MG 24 hr tablet TAKE 2 TABLETS BY MOUTH DAILY WITH DINNER 180 tablet 0     metoprolol succinate (TOPROL-XL) 100 MG 24 hr tablet Take 50 mg by mouth in combination with 12.5 for a total of 62.5 twice a day 90 tablet 3     NOVOLOG FLEXPEN 100 UNIT/ML soln INJECT 12 UNITS SUBCUTANEOUS 3 TIMES DAILY (WITH MEALS) 15 mL 0     potassium chloride SA (K-DUR/KLOR-CON M) 20 MEQ CR tablet Increase KCL to 40 MEQ in AM and 20 MEQ in the evening. 180 tablet 3     escitalopram (LEXAPRO) 10 MG tablet TAKE 1 TABLET (10 MG) BY MOUTH DAILY 90 tablet 0     LANTUS SOLOSTAR 100 UNIT/ML soln INJECT 25 UNTIS SUBCUTANEOUSLY EVERY DAY 15 mL 0     montelukast (SINGULAIR) 10 MG tablet TAKE 1 TABLET BY MOUTH AT BEDTIME 90 tablet 1     lisinopril (PRINIVIL/ZESTRIL) 2.5 MG tablet TAKE 1 TABLET (2.5 MG) BY  MOUTH DAILY 90 tablet 0     order for DME Equipment being ordered: Full face mask for CPAP - size: F10 large 1 each 0     traZODone (DESYREL) 50 MG tablet TAKE 1/2-1 TABLET BY MOUTH NIGHTLY AS NEEDED, CAN TITRATE DOWN TO 1/2TAB OR UP TO 2TABS AS NEEDED 180 tablet 0     alendronate (FOSAMAX) 70 MG tablet Take 1 tablet (70 mg) by mouth every 7 days 4 tablet 11     spironolactone (ALDACTONE) 25 MG tablet Take 2 tablets (50 mg) by mouth daily 180 tablet 3     blood glucose monitoring (NO BRAND SPECIFIED) test strip Use to test blood sugars 4 times daily or as directed 300 strip 3     blood glucose monitoring (ACCU-CHEK MULTICLIX) lancets Use to test blood sugar 4 times daily or as directed. 4 Box 3     insulin pen needle (B-D U/F) 31G X 8 MM Use 4 times daily (with Lantus and Novolog) or as directed. 400 each 3     insulin pen needle (BD JANE U/F) 32G X 4 MM Use 4 daily as directed. 200 each 11     blood glucose monitoring (ACCU-CHEK SHANNON PLUS) test strip Use to test blood sugar 4 times daily or as directed.  Ok to substitute alternative if insurance prefers. 120 strip 11     blood glucose monitoring (ACCU-CHEK FASTCLIX) lancets Use to test blood sugar 4 times daily or as directed.  Ok to substitute alternative if insurance prefers. 1 Box 11     ketoconazole (NIZORAL) 2 % cream Apply topically 2 times daily 60 g 1     albuterol (PROAIR HFA, PROVENTIL HFA, VENTOLIN HFA) 108 (90 BASE) MCG/ACT inhaler Inhale 2 puffs into the lungs every 6 hours 1 Inhaler 3     order for DME Oxygen: Patient requires supplemental Oxygen 4 LPM via nasal canula with activity. Please provide patient with a home unit and with portability capability. Oxygen will be for a lifetime. 1 Device 0     acyclovir (ZOVIRAX) 5 % ointment Apply topically 6 times daily (Patient taking differently: Apply topically 6 times daily As needed for outbreaks) 15 g 3     fluticasone (FLOVENT HFA) 220 MCG/ACT inhaler Inhale 2 puffs into the lungs 2 times daily 3  "Inhaler 3     acyclovir (ZOVIRAX) 400 MG tablet Take 400 mg by mouth See Admin Instructions 5 times daily as needed for outbreaks       fluticasone (FLONASE) 50 MCG/ACT nasal spray Spray 1-2 sprays into both nostrils daily (Patient taking differently: Spray 1-2 sprays into both nostrils daily as needed for allergies ) 16 g 5     COMPRESSION STOCKINGS Wear compression stockings in affected leg (right leg) or both legs most of the time during the day and take them off at night. 2 each 2     acetaminophen (TYLENOL) 500 MG tablet Take 500 mg by mouth nightly as needed        torsemide (DEMADEX) 10 MG tablet Take 3 tablets (30 mg) by mouth 2 times daily 180 tablet 3     No facility-administered medications prior to visit.        ROS:   CONSTITUTIONAL: Denies fever, chills, fatigue, or weight fluctuations.   HEENT: Denies headache, vision changes, and changes in speech.   CV: Refer to HPI.   PULMONARY: Refer to HPI.    GI:Denies nausea, vomiting, diarrhea, and abdominal pain. Bowel movements are regular. Complains of abdominal distention.   :Denies urinary alterations, dysuria, urinary frequency, hematuria, and abnormal drainage.   EXT:Denies lower extremity edema.   SKIN:Denies abnormal rashes or lesions.   MUSCULOSKELETAL:Denies upper or lower extremity weakness and pain.   NEUROLOGIC:Denies lightheadedness, dizziness, seizures, or upper or lower extremity paresthesia.     EXAM:  /77 (BP Location: Right arm, Patient Position: Chair, Cuff Size: Adult Regular)  Pulse 99  Ht 1.702 m (5' 7\")  Wt 95.7 kg (211 lb)  SpO2 92%  BMI 33.05 kg/m2  GENERAL: Appears alert and oriented times three.   HEENT: Eye symmetrical and free of discharge bilaterally. Mucous membranes moist and without lesions.  NECK: Supple and without lymphadenopathy. JVD 10-12 cm.   CV: RRR, S1S2 present without murmur, rub, or gallop.   RESPIRATORY: Respirations regular, even, and unlabored. Left basilar crackles.   GI: Soft and distended with " normoactive bowel sounds present in all quadrants. No tenderness, rebound, guarding. No organomegaly.   EXTREMITIES: Tace bilateral edema. 2+ bilateral pedal pulses.   NEUROLOGIC: Alert and orientated x 3. CN II-XII grossly intact. No focal deficits.   MUSCULOSKELETAL: No joint swelling or tenderness.   SKIN: No jaundice. No rashes or lesions.     Labs:  CBC RESULTS:  Lab Results   Component Value Date    WBC 15.6 (H) 2017    RBC 4.59 2017    HGB 13.4 2017    HCT 42.1 2017    MCV 92 2017    MCH 29.2 2017    MCHC 31.8 2017    RDW 16.7 (H) 2017     2017       CMP RESULTS:  Lab Results   Component Value Date     2018    POTASSIUM 4.3 2018    CHLORIDE 99 2018    CO2 29 2018    ANIONGAP 7 2018     (H) 2018    BUN 21 2018    CR 0.92 2018    GFRESTIMATED 59 (L) 2018    GFRESTBLACK 72 2018    CLAUDY 9.5 2018    BILITOTAL 0.6 10/06/2017    ALBUMIN 3.1 (L) 10/06/2017    ALKPHOS 121 10/06/2017    ALT 43 10/06/2017    AST 37 10/06/2017        INR RESULTS:  Lab Results   Component Value Date    INR 2.01 (H) 2018       Lab Results   Component Value Date    MAG 2.0 2017     Lab Results   Component Value Date    NTBNPI 3531 (H) 2017     Lab Results   Component Value Date    NTBNP 804 (H) 2018       Diagnostics:  Recent Results (from the past 4320 hour(s))   Echocardiogram Complete    Narrative    [id number]  [id number]  [id number]  [id number]^MELINDA^LOS^           [facility]  Diagnostic and Treamtent-3rd Floor  [address] [place] [id number]     Name: JEAN KENNEDY  MRN: [id number]  : 1940  Study Date: 10/17/2017 10:42 AM  Age: 77 yrs  Gender: Female  Patient Location: [facility]  Reason For Study: , Dyspnea, unspecified  Ordering Physician: LOS LAWRENCE  Referring Physician: LOS LAWRENCE  Performed By: Katerine Silva,  RDCS     BSA: 2.0 m2  Height: 66 in  Weight: 206 lb  HR: 95  BP: 125/82 mmHg  _____________________________________________________________________________  __        Procedure  Echocardiogram with two-dimensional, color and spectral Doppler performed.  Technically difficult study. Poor acoustic windows.  _____________________________________________________________________________  __        Interpretation Summary  Technically difficult study. Poor acoustic windows.  Global and regional left ventricular function is normal with an EF of 60-65%.  Mild right ventricular dilation is present. Global right ventricular function  is normal.  Pulmonary artery systolic pressure is normal.  The inferior vena cava is normal. Estimated mean right atrial pressure is 3  mmHg.  No pericardial effusion is present.  _____________________________________________________________________________  __        Left Ventricle  Global and regional left ventricular function is normal with an EF of 60-65%.  Left ventricular size is normal. Borderline concentric wall thickening  consistent with left ventricular hypertrophy is present. Grade II or moderate  diastolic dysfunction. Regional wall motion cannot be assessed.     Right Ventricle  Global right ventricular function is normal. Mild right ventricular dilation  is present.     Atria  Mild biatrial enlargement is present. The atrial septum is intact as assessed  by color Doppler .     Mitral Valve  The mitral valve is normal. Mild mitral insufficiency is present.        Aortic Valve  Aortic valve is normal in structure and function.     Tricuspid Valve  The tricuspid valve is normal. Mild tricuspid insufficiency is present.  Pulmonary artery systolic pressure is normal. The right ventricular systolic  pressure is approximated at 23.6 mmHg plus the right atrial pressure.     Pulmonic Valve  Mild pulmonic insufficiency is present.     Vessels  The aorta root is normal. The inferior vena cava  is normal. Estimated mean  right atrial pressure is 3 mmHg.     Pericardium  No pericardial effusion is present. Prominent epicardial fat is noted.        Compared to Previous Study  Comparison with prior studies is difficult due to poor image quality.  _____________________________________________________________________________  __  MMode/2D Measurements & Calculations  LA Volume (BP): 85.2 ml     LA Volume Index (BP): 42.0 ml/m2        Doppler Measurements & Calculations  MV E max gerald: 110.5 cm/sec  MV A max gerald: 28.4 cm/sec  MV E/A: 3.9  MV dec time: 0.16 sec  Ao V2 max: 113.0 cm/sec  Ao max P.0 mmHg  LV V1 max P.6 mmHg  LV V1 max: 106.7 cm/sec  LV V1 VTI: 22.2 cm  PA V2 max: 98.0 cm/sec  PA max PG: 3.8 mmHg  PI end-d gerald: 128.8 cm/sec  PI max gerald: 208.7 cm/sec  TR max gerald: 243.0 cm/sec  TR max P.6 mmHg     Pulm Sys Gerald: 17.4 cm/sec  Pulm Shay Gerald: 72.8 cm/sec  Pulm S/D: 0.24  Lateral E/e': 15.9  Med E to E': 13.9  Medial E/e': 13.9     _____________________________________________________________________________  __           Report approved by: Jd Daugherty 10/17/2017 01:53 PM          Assessment and Plan:   Ms. 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 She presents to [facility] for routine follow up and remains hypervolemic with worsening KIMBLE.       Heart failure with preserved ejection fraction.   ACC Functional Class III  Rate control: HR-99. Toprol XL 62.5 mg BID with improved dizziness and no change in palpitations.   volume status: Hypervolemic. Increase Torsemide to 40 mg in AM and 30 mg at HS.   Blood pressure control: BP stable. Lisinopril and Toprol XL.   Aldosterone antagonist: Aldactone 50 mg po daily   Evaluation of coronary  arteries: Lexiscan negative for ischemia 6/14  Sleep apnea screening: ANGELICA on CPAP  - If no improvement consider IV bolus at next follow up, which I discussed with her. Given persistent hypervolemia if no improvement would also consider repeat Echo and RHC given expected elevated pulmonary pressures in setting of ILD. She is scheduled with Dr. Rosa in 2 weeks.  - Chest X-ray negative for pulmonary edema or pleural effusions with concern for elevated pulmonary pressures.    -  with CRP 51.       HTN.   - BP controlled on current regimen.       Afib. Holter notes persistent afib 100% of the time with rates up to 140's at rest. CHADSVASC-5.    - Toprol XL to 62.5 mg po BID.   - Coumadin per Coumadin clinic.   - Appreciate EP consult.       ILD and Follicular Bronchiolitis  - Management per Pulmonary.   - Consider RHC in future, would be ideal if she was euvolemic.       GERD with PUD. History of GI bleed secondary to diverticular bleed 6/28/16 resolved with oral Vitamin K.    - Protonix to 40 mg po BID.       Sjogren's Disease.   - Management per Rheumatology.   - Discussion with Rheumatology regarding transition from oral steroids to Rituximab infusions. Would be reluctant given refractory hypervolemia and risk for cardiotoxicity. She will further discuss this with Dr. Rosa in 2 weeks.     Follow up BMP in 1 week and with Dr. Rosa 4/26/18.       Kiesha Elias  4/11/2018

## 2018-04-11 NOTE — PROGRESS NOTES
HPI:   Ms. 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  She is using her oxygen for long distances at [facility]. She presents to [facility] for routine follow up.     She continues to complain of abdominal distention and worsening KIMBLE. She continues to use her oxygen at [facility] with strenuous activity only, but has it in clinic today and does not normally bring it. She notes adequate urine output when she increases her Torsemide for a short period of time, but then returns to prior a few days after the dose change. She notes improvement in dizziness with the recent change in Toprol XL. She denies fever, chills, chest pain, palpitations, PND, orthopnea, nausea, vomiting, diarrhea, hematochezia, melena, or LE edema. Her weight is around 211-213 lbs. She continues to follow a low sodium diet.      PAST MEDICAL HISTORY:  Past Medical History:   Diagnosis Date     Alcohol abuse, in remission      Allergic rhinitis, cause unspecified     allegra helps when she takes it     Antiplatelet or antithrombotic long-term use      Atrial fibrillation (H)     in hosp in 11/11 after surgery w/ fluid overload     Cardiomegaly     LVH on stress echo- cardiac w/u at [facility] ER- neg CT scan for PE, neg stress echo in 8/06     Chest pain, unspecified      Disorder of bone and cartilage, unspecified     osteopenia (had been on prempro), improved on 6/06 dexa, stable dexa 11/10     Diverticulosis of colon (without mention of hemorrhage)     last episode yrs ago     Essential hypertension, benign      Gastro-oesophageal reflux disease      Insomnia, unspecified     weaned off clonazepam     Irregular heart beat      Lumbago 7/09    MRI with NEAL, now seeing Dr. Cain for sciatic sx's     Major  depressive disorder, recurrent episode, moderate (H)      Obstructive sleep apnea      Osteoarthrosis, unspecified whether generalized or localized, unspecified site      Sjogren's syndrome (H)      Sleep apnea      Tobacco use disorder     chantix in 9/07, started again in 6/08, working       FAMILY HISTORY:  Family History   Problem Relation Age of Onset     C.A.D. Mother 63     MI- first at age 63     HEART DISEASE Mother      Hypertension Mother      CEREBROVASCULAR DISEASE Mother      Hyperlipidemia Mother      Alcohol/Drug Father      Alzheimer Disease Father      Dementia Father      Hypertension Father      Hyperlipidemia Father      C.A.D. Sister 52     Minor MI- age 50's     HEART DISEASE Sister      Hypertension Sister      Hypertension Sister      Hypertension Brother      Cancer - colorectal Sister 48     Late 40's early 50's     Prostate Cancer Brother 74     Dx'd age 74     GASTROINTESTINAL DISEASE Sister      Diverticulitis     GASTROINTESTINAL DISEASE Brother      Diverticulitis     Lipids Sister      Lipids Sister      Parkinsonism Brother      DIABETES Sister      HEART DISEASE Sister      CHF     CANCER Sister      lung, smoker     Substance Abuse Sister      Substance Abuse Brother      Asthma Sister      CANCER Sister      Breast Cancer Daughter      Prostate Cancer Brother      Hyperlipidemia Brother        SOCIAL HISTORY:  Social History     Social History     Marital status: Single     Spouse name: N/A     Number of children: 0     Years of education: Ed Spec De     Occupational History     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5 MG tablet TAKE 1 TABLET BY MOUTH DAILY. CURRENT DOSE IS 7.5 MG DAILY. DOSE ADJUSTED PER INR RESULT. 90 tablet 0     atorvastatin (LIPITOR) 10 MG tablet Take 1 tablet (10 mg) by mouth daily 90 tablet 0     metoprolol succinate (TOPROL XL) 25 MG 24 hr tablet Take 0.5 tablets (12.5 mg) by mouth 2 times daily Take 50 mg by mouth in combination with 12.5 for a total of 62.5 twice a day 30 tablet 3     predniSONE (DELTASONE) 10 MG tablet Take 2 tablets for three days (4/12-14), then take 1 tablet daily (10 mg daily starting 4/15) 90 tablet 3     metFORMIN (GLUCOPHAGE-XR) 500 MG 24 hr tablet TAKE 2 TABLETS BY MOUTH DAILY WITH DINNER 180 tablet 0     metoprolol succinate (TOPROL-XL) 100 MG 24 hr tablet Take 50 mg by mouth in combination with 12.5 for a total of 62.5 twice a day 90 tablet 3     NOVOLOG FLEXPEN 100 UNIT/ML soln INJECT 12 UNITS SUBCUTANEOUS 3 TIMES DAILY (WITH MEALS) 15 mL 0     potassium chloride SA (K-DUR/KLOR-CON M) 20 MEQ CR tablet Increase KCL to 40 MEQ in AM and 20 MEQ in the evening. 180 tablet 3     escitalopram (LEXAPRO) 10 MG tablet TAKE 1 TABLET (10 MG) BY MOUTH DAILY 90 tablet 0     LANTUS SOLOSTAR 100 UNIT/ML soln INJECT 25 UNTIS SUBCUTANEOUSLY EVERY DAY 15 mL 0     montelukast (SINGULAIR) 10 MG tablet TAKE 1 TABLET BY MOUTH AT BEDTIME 90 tablet 1     lisinopril (PRINIVIL/ZESTRIL) 2.5 MG tablet TAKE 1 TABLET (2.5 MG) BY MOUTH DAILY 90 tablet 0     order for DME Equipment being ordered: Full face mask for CPAP - size: F10 large 1 each 0     traZODone (DESYREL) 50 MG tablet TAKE 1/2-1 TABLET BY MOUTH NIGHTLY AS NEEDED, CAN TITRATE DOWN TO 1/2TAB OR UP TO 2TABS AS NEEDED 180 tablet 0     alendronate (FOSAMAX) 70 MG tablet Take 1  tablet (70 mg) by mouth every 7 days 4 tablet 11     spironolactone (ALDACTONE) 25 MG tablet Take 2 tablets (50 mg) by mouth daily 180 tablet 3     blood glucose monitoring (NO BRAND SPECIFIED) test strip Use to test blood sugars 4 times daily or as directed 300 strip 3     blood glucose monitoring (ACCU-CHEK MULTICLIX) lancets Use to test blood sugar 4 times daily or as directed. 4 Box 3     insulin pen needle (B-D U/F) 31G X 8 MM Use 4 times daily (with Lantus and Novolog) or as directed. 400 each 3     insulin pen needle (BD JANE U/F) 32G X 4 MM Use 4 daily as directed. 200 each 11     blood glucose monitoring (ACCU-CHEK SHANNON PLUS) test strip Use to test blood sugar 4 times daily or as directed.  Ok to substitute alternative if insurance prefers. 120 strip 11     blood glucose monitoring (ACCU-CHEK FASTCLIX) lancets Use to test blood sugar 4 times daily or as directed.  Ok to substitute alternative if insurance prefers. 1 Box 11     ketoconazole (NIZORAL) 2 % cream Apply topically 2 times daily 60 g 1     albuterol (PROAIR HFA, PROVENTIL HFA, VENTOLIN HFA) 108 (90 BASE) MCG/ACT inhaler Inhale 2 puffs into the lungs every 6 hours 1 Inhaler 3     order for DME Oxygen: Patient requires supplemental Oxygen 4 LPM via nasal canula with activity. Please provide patient with a home unit and with portability capability. Oxygen will be for a lifetime. 1 Device 0     acyclovir (ZOVIRAX) 5 % ointment Apply topically 6 times daily (Patient taking differently: Apply topically 6 times daily As needed for outbreaks) 15 g 3     fluticasone (FLOVENT HFA) 220 MCG/ACT inhaler Inhale 2 puffs into the lungs 2 times daily 3 Inhaler 3     acyclovir (ZOVIRAX) 400 MG tablet Take 400 mg by mouth See Admin Instructions 5 times daily as needed for outbreaks       fluticasone (FLONASE) 50 MCG/ACT nasal spray Spray 1-2 sprays into both nostrils daily (Patient taking differently: Spray 1-2 sprays into both nostrils daily as needed for  "allergies ) 16 g 5     COMPRESSION STOCKINGS Wear compression stockings in affected leg (right leg) or both legs most of the time during the day and take them off at night. 2 each 2     acetaminophen (TYLENOL) 500 MG tablet Take 500 mg by mouth nightly as needed        torsemide (DEMADEX) 10 MG tablet Take 3 tablets (30 mg) by mouth 2 times daily 180 tablet 3     No facility-administered medications prior to visit.        ROS:   CONSTITUTIONAL: Denies fever, chills, fatigue, or weight fluctuations.   HEENT: Denies headache, vision changes, and changes in speech.   CV: Refer to HPI.   PULMONARY: Refer to HPI.    GI:Denies nausea, vomiting, diarrhea, and abdominal pain. Bowel movements are regular. Complains of abdominal distention.   :Denies urinary alterations, dysuria, urinary frequency, hematuria, and abnormal drainage.   EXT:Denies lower extremity edema.   SKIN:Denies abnormal rashes or lesions.   MUSCULOSKELETAL:Denies upper or lower extremity weakness and pain.   NEUROLOGIC:Denies lightheadedness, dizziness, seizures, or upper or lower extremity paresthesia.     EXAM:  /77 (BP Location: Right arm, Patient Position: Chair, Cuff Size: Adult Regular)  Pulse 99  Ht 1.702 m (5' 7\")  Wt 95.7 kg (211 lb)  SpO2 92%  BMI 33.05 kg/m2  GENERAL: Appears alert and oriented times three.   HEENT: Eye symmetrical and free of discharge bilaterally. Mucous membranes moist and without lesions.  NECK: Supple and without lymphadenopathy. JVD 10-12 cm.   CV: RRR, S1S2 present without murmur, rub, or gallop.   RESPIRATORY: Respirations regular, even, and unlabored. Left basilar crackles.   GI: Soft and distended with normoactive bowel sounds present in all quadrants. No tenderness, rebound, guarding. No organomegaly.   EXTREMITIES: Tace bilateral edema. 2+ bilateral pedal pulses.   NEUROLOGIC: Alert and orientated x 3. CN II-XII grossly intact. No focal deficits.   MUSCULOSKELETAL: No joint swelling or tenderness. "   SKIN: No jaundice. No rashes or lesions.     Labs:  CBC RESULTS:  Lab Results   Component Value Date    WBC 15.6 (H) 2017    RBC 4.59 2017    HGB 13.4 2017    HCT 42.1 2017    MCV 92 2017    MCH 29.2 2017    MCHC 31.8 2017    RDW 16.7 (H) 2017     2017       CMP RESULTS:  Lab Results   Component Value Date     2018    POTASSIUM 4.3 2018    CHLORIDE 99 2018    CO2 29 2018    ANIONGAP 7 2018     (H) 2018    BUN 21 2018    CR 0.92 2018    GFRESTIMATED 59 (L) 2018    GFRESTBLACK 72 2018    CLAUDY 9.5 2018    BILITOTAL 0.6 10/06/2017    ALBUMIN 3.1 (L) 10/06/2017    ALKPHOS 121 10/06/2017    ALT 43 10/06/2017    AST 37 10/06/2017        INR RESULTS:  Lab Results   Component Value Date    INR 2.01 (H) 2018       Lab Results   Component Value Date    MAG 2.0 2017     Lab Results   Component Value Date    NTBNPI 3531 (H) 2017     Lab Results   Component Value Date    NTBNP 804 (H) 2018       Diagnostics:  Recent Results (from the past 4320 hour(s))   Echocardiogram Complete    Narrative    [id number]  [facility]19  [id number]  [id number]^MELINDA^LOS^           [facility]  Diagnostic and Treamtent-3rd Floor  [address] [place] [id number]     Name: JEAN KENNEDY  MRN: [id number]  : 1940  Study Date: 10/17/2017 10:42 AM  Age: 77 yrs  Gender: Female  Patient Location: [facility]  Reason For Study: , Dyspnea, unspecified  Ordering Physician: LOS LAWRENCE  Referring Physician: LOS LAWRENCE  Performed By: Katerine Silva RDCS     BSA: 2.0 m2  Height: 66 in  Weight: 206 lb  HR: 95  BP: 125/82 mmHg  _____________________________________________________________________________  __        Procedure  Echocardiogram with two-dimensional, color and spectral Doppler performed.  Technically difficult study. Poor acoustic  windows.  _____________________________________________________________________________  __        Interpretation Summary  Technically difficult study. Poor acoustic windows.  Global and regional left ventricular function is normal with an EF of 60-65%.  Mild right ventricular dilation is present. Global right ventricular function  is normal.  Pulmonary artery systolic pressure is normal.  The inferior vena cava is normal. Estimated mean right atrial pressure is 3  mmHg.  No pericardial effusion is present.  _____________________________________________________________________________  __        Left Ventricle  Global and regional left ventricular function is normal with an EF of 60-65%.  Left ventricular size is normal. Borderline concentric wall thickening  consistent with left ventricular hypertrophy is present. Grade II or moderate  diastolic dysfunction. Regional wall motion cannot be assessed.     Right Ventricle  Global right ventricular function is normal. Mild right ventricular dilation  is present.     Atria  Mild biatrial enlargement is present. The atrial septum is intact as assessed  by color Doppler .     Mitral Valve  The mitral valve is normal. Mild mitral insufficiency is present.        Aortic Valve  Aortic valve is normal in structure and function.     Tricuspid Valve  The tricuspid valve is normal. Mild tricuspid insufficiency is present.  Pulmonary artery systolic pressure is normal. The right ventricular systolic  pressure is approximated at 23.6 mmHg plus the right atrial pressure.     Pulmonic Valve  Mild pulmonic insufficiency is present.     Vessels  The aorta root is normal. The inferior vena cava is normal. Estimated mean  right atrial pressure is 3 mmHg.     Pericardium  No pericardial effusion is present. Prominent epicardial fat is noted.        Compared to Previous Study  Comparison with prior studies is difficult due to poor image  quality.  _____________________________________________________________________________  __  MMode/2D Measurements & Calculations  LA Volume (BP): 85.2 ml     LA Volume Index (BP): 42.0 ml/m2        Doppler Measurements & Calculations  MV E max gerald: 110.5 cm/sec  MV A max gerald: 28.4 cm/sec  MV E/A: 3.9  MV dec time: 0.16 sec  Ao V2 max: 113.0 cm/sec  Ao max P.0 mmHg  LV V1 max P.6 mmHg  LV V1 max: 106.7 cm/sec  LV V1 VTI: 22.2 cm  PA V2 max: 98.0 cm/sec  PA max PG: 3.8 mmHg  PI end-d gerald: 128.8 cm/sec  PI max gerald: 208.7 cm/sec  TR max gerald: 243.0 cm/sec  TR max P.6 mmHg     Pulm Sys Gerald: 17.4 cm/sec  Pulm Shay Gerald: 72.8 cm/sec  Pulm S/D: 0.24  Lateral E/e': 15.9  Med E to E': 13.9  Medial E/e': 13.9     _____________________________________________________________________________  __           Report approved by: Jd Daugherty 10/17/2017 01:53 PM          Assessment and Plan:   Ms. 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 She presents to [facility] for routine follow up and remains hypervolemic with worsening KIMBLE.       Heart failure with preserved ejection fraction.   ACC Functional Class III  Rate control: HR-99. Toprol XL 62.5 mg BID with improved dizziness and no change in palpitations.   volume status: Hypervolemic. Increase Torsemide to 40 mg in AM and 30 mg at HS.   Blood pressure control: BP stable. Lisinopril and Toprol XL.   Aldosterone antagonist: Aldactone 50 mg po daily   Evaluation of coronary arteries: Lexiscan negative for ischemia   Sleep apnea screening: ANGELICA on CPAP  - If no improvement consider IV bolus at next follow up, which I discussed with her. Given persistent hypervolemia if no improvement would also consider repeat  Echo and RHC given expected elevated pulmonary pressures in setting of ILD. She is scheduled with Dr. Rosa in 2 weeks.  - Chest X-ray negative for pulmonary edema or pleural effusions with concern for elevated pulmonary pressures.    -  with CRP 51.       HTN.   - BP controlled on current regimen.       Afib. Holter notes persistent afib 100% of the time with rates up to 140's at rest. CHADSVASC-5.    - Toprol XL to 62.5 mg po BID.   - Coumadin per Coumadin clinic.   - Appreciate EP consult.       ILD and Follicular Bronchiolitis  - Management per Pulmonary.   - Consider RHC in future, would be ideal if she was euvolemic.       GERD with PUD. History of GI bleed secondary to diverticular bleed 6/28/16 resolved with oral Vitamin K.    - Protonix to 40 mg po BID.       Sjogren's Disease.   - Management per Rheumatology.   - Discussion with Rheumatology regarding transition from oral steroids to Rituximab infusions. Would be reluctant given refractory hypervolemia and risk for cardiotoxicity. She will further discuss this with Dr. Rosa in 2 weeks.     Follow up BMP in 1 week and with Dr. Rosa 4/26/18.       Kiesha Meehan  4/11/2018            KIESHA MEEHAN

## 2018-04-11 NOTE — NURSING NOTE
Chief Complaint   Patient presents with     Follow Up For     Return [facility]; 77 year old with diastolic heart failure presenting for follow up with labs prior       Vitals were taken, per pt everything on medication list is up to date.    Ashlyn Abebe MA    2:36 PM

## 2018-04-11 NOTE — MR AVS SNAPSHOT
After Visit Summary   4/11/2018    Betty Kennedy    MRN: [id number]           Patient Information     Date Of Birth          1940        Visit Information        Provider Department      4/11/2018 2:30 PM Kiesha Elias APRN CNP [facility]        Today's Diagnoses     Shortness of breath    -  1    (HFpEF) heart failure with preserved ejection fraction (H)          Care Instructions    You were seen today in the Cardiovascular Clinic at the [facility].     Cardiology Providers you saw during your visit: Kiesha MEDEL CNP      Follow up and medication changes:    1. Increase Torsemide to 40 mg in the morning, 30 mg in the afternoon  2. Repeat Basic Metabolic Panel lab in one week - 1:00 at [facility].   3. Chest xray ordered, please get as soon as convenient.   4. Follow up with Dr Rosa on April 26th as scheduled.     Results for BETTY KENNEDY (MRN [id number]) as of 4/11/2018 14:29   Ref. Range 3/28/2018 14:21   Sodium Latest Ref Range: 133 - 144 mmol/L 137   Potassium Latest Ref Range: 3.4 - 5.3 mmol/L 3.7   Chloride Latest Ref Range: 94 - 109 mmol/L 101   Carbon Dioxide Latest Ref Range: 20 - 32 mmol/L 29   Urea Nitrogen Latest Ref Range: 7 - 30 mg/dL 21   Creatinine Latest Ref Range: 0.52 - 1.04 mg/dL 0.99   GFR Estimate Latest Ref Range: >60 mL/min/1.7m2 54 (L)   GFR Estimate If Black Latest Ref Range: >60 mL/min/1.7m2 66   Calcium Latest Ref Range: 8.5 - 10.1 mg/dL 9.1   Anion Gap Latest Ref Range: 3 - 14 mmol/L 7   Glucose Latest Ref Range: 70 - 99 mg/dL 142 (H)       Please limit your fluid intake to 2 L (68 ounces) daily.  2 Liters a day = 8.5 cups, or 68 ounces.  Please limit your salt intake to 2 grams a day or less.     If you gain 2# in 24 hours or 5# in one week call Beatriz Blanco RN so we can adjust your medications as needed over the phone.     Please feel free to call me with any questions or concerns.       Beatriz Blanco  "RN  [facility]  Cardiology Care Coordinator-Heart Failure Clinic     Questions and [phone].   First press #1 for the University and then press #3 for \"Medical Questions\" to reach us Cardiology Nurses.      On Call Cardiologist for after hours or on weekends: [phone]   option #4 and ask to speak to the on-call Cardiologist. Inform them you are a CORE/heart failure patient at the [place].           If you need a medication refill please contact your pharmacy.  Please allow 3 business days for your refill to be completed.  _______________________________________________________  C.O.R.E. CLINIC Cardiomyopathy, Optimization, Rehabilitation, Education   The C.O.R.E. CLINIC is a heart failure specialty clinic within the [facility] where you will work with specialized nurse practitioners dedicated to helping patients with heart failure carefully adjust medications, receive education, and learn who and when to call if symptoms develop. They specialize in helping you better understand your condition, slow the progression of your disease, improve the length and quality of your life, help you detect future heart problems before they become life threatening, and avoid hospitalizations.  As always, thank you for trusting us with your health care needs!             Follow-ups after your visit        Your next 10 appointments already scheduled     2018  3:00 PM CDT   Lab with SHAYY LAB   [facility] ([facility])    [address]  [facility] [address] [phone]            2018  3:30 PM CDT   (Arrive by 3:15 PM)   RETURN HEART FAILURE with Radha Rosa MD   [facility] ([facility])    [address] [facility]  [address] [place] [address] [phone]            May 01, 2018  1:30 PM CDT   Return Sleep Patient with Magali Green, " APRN CNP   [facility] [place] Sleep [facility] ([facility])    [address] [place] [id number] [phone]            May 23, 2018 10:00 AM CDT   Office Visit with Ilene Tristan MD   [facility] ([facility])    [address]  [facility] [id number] [phone]           Bring a current list of meds and any records pertaining to this visit. For Physicals, please bring immunization records and any forms needing to be filled out. Please arrive 10 minutes early to complete paperwork.            Jun 07, 2018  4:00 PM CDT   (Arrive by 3:45 PM)   Return Visit with Carmenza Stringer MD   [facility] ([facility])    [address] [facility]  [address]  [facility] [id number] [phone]            Jul 18, 2018 12:30 PM CDT   SIX MINUTE WALK with UC PFL A,  PFL 6 MINUTE WALK 1   [facility] ([facility])    [address]  3rd Floor  [facility] [id number] [phone]            Jul 18, 2018  1:30 PM CDT   (Arrive by 1:15 PM)   Return Interstitial Lung with Meño Walsh MD   [facility] ([facility])    [address] [facility] [address]  [address]  [facility] [id number] [phone]              Future tests that were ordered for you today     Open Future Orders        [facility] Expected Expires Ordered    X-ray Chest 2 vws* Routine 4/11/2018 4/11/2019 4/11/2018    Basic metabolic panel Routine 4/18/2018 4/11/2019 4/11/2018            Who to contact     If you have questions or need follow up information about today's clinic visit or your schedule please contact [facility] directly at [phone].  Normal or non-critical lab and imaging results will be communicated to you by MyChart, letter or phone within 4 business days after the clinic has received the results.  "If you do not hear from us within 7 days, please contact the clinic through [facility] or phone. If you have a critical or abnormal lab result, we will notify you by phone as soon as possible.  Submit refill requests through [employer] or call your pharmacy and they will forward the refill request to us. Please allow 3 business days for your refill to be completed.          Additional Information About Your Visit        [facility] Information     [employer] gives you secure access to your electronic health record. If you see a primary care provider, you can also send messages to your care team and make appointments. If you have questions, please call your primary care clinic.  If you do not have a primary care provider, please call [phone] and they will assist you.        Care EveryWhere ID     This is your Care EveryWhere ID. This could be used by other organizations to access your [place] medical records  [id number]        Your Vitals Were     Pulse Height Pulse Oximetry BMI (Body Mass Index)          99 1.702 m (5' 7\") 92% 33.05 kg/m2         Blood Pressure from Last 3 Encounters:   04/11/18 118/77   03/28/18 106/66   03/08/18 112/73    Weight from Last 3 Encounters:   04/11/18 95.7 kg (211 lb)   03/28/18 97.2 kg (214 lb 3.2 oz)   03/08/18 96.2 kg (212 lb)              We Performed the Following     CRP inflammation     Follow-Up with [facility]     N terminal pro BNP outpatient          Today's Medication Changes          These changes are accurate as of 4/11/18  3:33 PM.  If you have any questions, ask your nurse or doctor.               These medicines have changed or have updated prescriptions.        Dose/Directions    acyclovir 5 % ointment   Commonly known as:  ZOVIRAX   This may have changed:  additional instructions   Used for:  Recurrent cold sores        Apply topically 6 times daily   Quantity:  15 g   Refills:  3       fluticasone 50 MCG/ACT spray   Commonly known as:  FLONASE   This may have changed:  "   - when to take this  - reasons to take this   Used for:  Seasonal allergic rhinitis        Dose:  1-2 spray   Spray 1-2 sprays into both nostrils daily   Quantity:  16 g   Refills:  5       torsemide 10 MG tablet   Commonly known as:  DEMADEX   This may have changed:    - how much to take  - how to take this  - when to take this  - additional instructions   Used for:  (HFpEF) heart failure with preserved ejection fraction (H)   Changed by:  Kiesha Elias APRN CNP        Take 40 mg in the morning, 30 mg in the afternoon.   Quantity:  180 tablet   Refills:  3            Where to get your medicines      These medications were [facility] to [id number] [place]  [address]  [address] [place] [address]     Phone:  [phone]     torsemide 10 MG tablet                Primary Care Provider Office Phone # Fax #    Ilene Tristan [phone] [address] [address]        Equal Access to Services     [facility] AH: Hadii aad ku hadasho Soomaali, waaxda luqadaha, qaybta kaalmada adeegyada, waxay idiin haysanket mercedes . So [facility] [phone].    ATENCIÓN: Si habla español, tiene a conti disposición servicios gratuitos de asistencia lingüística. [facility] [phone].    We comply with applicable federal civil rights laws and [place] laws. We do not discriminate on the basis of race, color, national origin, age, disability, sex, sexual orientation, or gender identity.            Thank you!     Thank you for choosing [facility]  for your care. Our goal is always to provide you with excellent care. Hearing back from our patients is one way we can continue to improve our services. Please take a few minutes to complete the written survey that you may receive in the mail after your visit with us. Thank you!             Your Updated Medication List - Protect others around you: Learn how to safely use,  store and throw away your medicines at www.disposemymeds.org.          This list is accurate as of 4/11/18  3:33 PM.  Always use your most recent med list.                   Brand Name Dispense Instructions for use Diagnosis    acyclovir 400 MG tablet    ZOVIRAX     Take 400 mg by mouth See Admin Instructions 5 times daily as needed for outbreaks        acyclovir 5 % ointment    ZOVIRAX    15 g    Apply topically 6 times daily    Recurrent cold sores       albuterol 108 (90 Base) MCG/ACT Inhaler    PROAIR HFA/PROVENTIL HFA/VENTOLIN HFA    1 Inhaler    Inhale 2 puffs into the lungs every 6 hours    ILD (interstitial lung disease) (H)       alendronate 70 MG tablet    FOSAMAX    4 tablet    Take 1 tablet (70 mg) by mouth every 7 days    Other osteoporosis without current pathological fracture       atorvastatin 10 MG tablet    LIPITOR    90 tablet    Take 1 tablet (10 mg) by mouth daily    Hyperlipidemia LDL goal <100       * blood glucose monitoring lancets     4 Box    Use to test blood sugar 4 times daily or as directed.    Type 2 diabetes mellitus without complication, without long-term current use of insulin (H)       * blood glucose monitoring lancets     1 Box    Use to test blood sugar 4 times daily or as directed.  Ok to substitute alternative if insurance prefers.    Type 2 diabetes mellitus without complication, without long-term current use of insulin (H)       * blood glucose monitoring test strip    no brand specified    300 strip    Use to test blood sugars 4 times daily or as directed    Type 2 diabetes mellitus without complication, without long-term current use of insulin (H)       * blood glucose monitoring test strip    ACCU-CHEK SHANNON PLUS    120 strip    Use to test blood sugar 4 times daily or as directed.  Ok to substitute alternative if insurance prefers.    Type 2 diabetes mellitus without complication, without long-term current use of insulin (H)       COMPRESSION STOCKINGS     2 each     Wear compression stockings in affected leg (right leg) or both legs most of the time during the day and take them off at night.    DVT (deep venous thrombosis), right, Postphlebitic syndrome, Chronic anticoagulation, Atrial fibrillation (H)       escitalopram 10 MG tablet    LEXAPRO    90 tablet    TAKE 1 TABLET (10 MG) BY MOUTH DAILY    Major depressive disorder, recurrent episode, moderate (H)       fluticasone 220 MCG/ACT Inhaler    FLOVENT HFA    3 Inhaler    Inhale 2 puffs into the lungs 2 times daily    ILD (interstitial lung disease) (H), Follicular bronchiolitis (H)       fluticasone 50 MCG/ACT spray    FLONASE    16 g    Spray 1-2 sprays into both nostrils daily    Seasonal allergic rhinitis       * insulin pen needle 31G X 8 MM    B-D U/F    400 each    Use 4 times daily (with Lantus and Novolog) or as directed.    Type 2 diabetes mellitus without complication, without long-term current use of insulin (H)       * insulin pen needle 32G X 4 MM    BD JANE U/F    200 each    Use 4 daily as directed.    Type 2 diabetes mellitus without complication, without long-term current use of insulin (H)       ketoconazole 2 % cream    NIZORAL    60 g    Apply topically 2 times daily    Cutaneous candidiasis       * LANTUS SOLOSTAR 100 UNIT/ML injection   Generic drug:  insulin glargine     15 mL    INJECT 25 UNTIS SUBCUTANEOUSLY EVERY DAY    Type 2 diabetes mellitus with hyperglycemia, with long-term current use of insulin (H)       * BASAGLAR 100 UNIT/ML injection     7.5 mL    Inject 25 Units Subcutaneous daily    Type 2 diabetes mellitus with hyperglycemia, with long-term current use of insulin (H)       lisinopril 2.5 MG tablet    PRINIVIL/Zestril    90 tablet    TAKE 1 TABLET (2.5 MG) BY MOUTH DAILY    Essential hypertension with goal blood pressure less than 140/90       metFORMIN 500 MG 24 hr tablet    GLUCOPHAGE-XR    180 tablet    TAKE 2 TABLETS BY MOUTH DAILY WITH DINNER    Type 2 diabetes mellitus without  complication, without long-term current use of insulin (H)       * metoprolol succinate 100 MG 24 hr tablet    TOPROL-XL    90 tablet    Take 50 mg by mouth in combination with 12.5 for a total of 62.5 twice a day    Atrial fibrillation with controlled ventricular response (H)       * metoprolol succinate 25 MG 24 hr tablet    TOPROL XL    30 tablet    Take 0.5 tablets (12.5 mg) by mouth 2 times daily Take 50 mg by mouth in combination with 12.5 for a total of 62.5 twice a day    (HFpEF) heart failure with preserved ejection fraction (H), Persistent atrial fibrillation (H)       montelukast 10 MG tablet    SINGULAIR    90 tablet    TAKE 1 TABLET BY MOUTH AT BEDTIME    Sjogren's syndrome with lung involvement (H), ILD (interstitial lung disease) (H), Chronic seasonal allergic rhinitis due to other allergen       NovoLOG FLEXPEN 100 UNIT/ML injection   Generic drug:  insulin aspart     15 mL    INJECT 12 UNITS SUBCUTANEOUS 3 TIMES DAILY (WITH MEALS)    Type 2 diabetes mellitus with other specified complication (H)       * order for DME     1 Device    Oxygen: Patient requires supplemental Oxygen 4 LPM via nasal canula with activity. Please provide patient with a home unit and with portability capability. Oxygen will be for a lifetime.    Hypoxia, ILD (interstitial lung disease) (H)       * order for DME     1 each    Equipment being ordered: Full face mask for CPAP - size: F10 large    ANGELICA (obstructive sleep apnea)       potassium chloride SA 20 MEQ CR tablet    K-DUR/KLOR-CON M    180 tablet    Increase KCL to 40 MEQ in AM and 20 MEQ in the evening.    (HFpEF) heart failure with preserved ejection fraction (H)       predniSONE 10 MG tablet    DELTASONE    90 tablet    Take 2 tablets for three days (4/12-14), then take 1 tablet daily (10 mg daily starting 4/15)    ILD (interstitial lung disease) (H), Sjogren's syndrome with lung involvement (H)       spironolactone 25 MG tablet    ALDACTONE    180 tablet    Take 2  tablets (50 mg) by mouth daily    Chronic diastolic congestive heart failure (H)       torsemide 10 MG tablet    DEMADEX    180 tablet    Take 40 mg in the morning, 30 mg in the afternoon.    (HFpEF) heart failure with preserved ejection fraction (H)       traZODone 50 MG tablet    DESYREL    180 tablet    TAKE 1/2-1 TABLET BY MOUTH NIGHTLY AS NEEDED, CAN TITRATE DOWN TO 1/2TAB OR UP TO 2TABS AS NEEDED    Insomnia due to medical condition       TYLENOL 500 MG tablet   Generic drug:  acetaminophen      Take 500 mg by mouth nightly as needed    Dizziness       warfarin 7.5 MG tablet    COUMADIN    90 tablet    TAKE 1 TABLET BY MOUTH DAILY. CURRENT DOSE IS 7.5 MG DAILY. DOSE ADJUSTED PER INR RESULT.    Atrial fibrillation with controlled ventricular response (H)       * Notice:  This list has 12 medication(s) that are the same as other medications prescribed for you. Read the directions carefully, and ask your doctor or other care provider to review them with you.

## 2018-04-13 ENCOUNTER — CARE COORDINATION (OUTPATIENT)
Dept: CARDIOLOGY | Facility: CLINIC | Age: 78
End: 2018-04-13

## 2018-04-13 NOTE — PROGRESS NOTES
Pt called about lab results. Reviewed history- last time levels drawn- elevated higher. Pt verbalized understanding. Stated she would like follow up on results and if NP recommends any change in plan of care.     Routed msg

## 2018-04-14 DIAGNOSIS — I10 ESSENTIAL HYPERTENSION WITH GOAL BLOOD PRESSURE LESS THAN 140/90: ICD-10-CM

## 2018-04-16 ENCOUNTER — TELEPHONE (OUTPATIENT)
Dept: FAMILY MEDICINE | Facility: CLINIC | Age: 78
End: 2018-04-16
Payer: COMMERCIAL

## 2018-04-16 DIAGNOSIS — E11.69 TYPE 2 DIABETES MELLITUS WITH OTHER SPECIFIED COMPLICATION (H): ICD-10-CM

## 2018-04-16 RX ORDER — LISINOPRIL 2.5 MG/1
TABLET ORAL
Qty: 90 TABLET | Refills: 0 | Status: SHIPPED | OUTPATIENT
Start: 2018-04-16 | End: 2018-07-22

## 2018-04-16 NOTE — TELEPHONE ENCOUNTER
Prescription approved per [facility] Refill Protocol.  Sophia Hemphill RN

## 2018-04-16 NOTE — TELEPHONE ENCOUNTER
ANTICOAGULATION FOLLOW-UP CLINIC VISIT    Patient Name:  Betty Tee  Date:  4/16/2018  Contact Type:  Telephone    SUBJECTIVE:  Bleeding Signs/Symptoms: None  Thromboembolic Signs/Symptoms: None    Medication Changes:  No  Dietary Changes:  No  Bacterial/Viral Infection:  No    Missed Coumadin Doses:  None  Other Concerns:  No      ASSESSMENT/PLAN:  See: ANTICOAGULATION QIC flow sheet.    INR 2.01  11.25mg tonight, then 7.5mg daily  Recheck INR 1 week  Patient has been on low end of therapeutic range  Wants to trial one day of 11.25mg (1.5 pills) to see if this keeps her more in middle of her range  Coming in for bloodwork Wed anyway   Will recheck INR then  Lydia HALEY RN    Dosage adjustment made based on physician directed care plan.    JIMI VOGT

## 2018-04-16 NOTE — TELEPHONE ENCOUNTER
"Requested Prescriptions   Pending Prescriptions Disp Refills     NOVOLOG FLEXPEN 100 UNIT/ML soln [Pharmacy Med Name: NOVOLOG 100 UNITS/ML FLEXPEN]  Last Written Prescription Date:  03/05/2018  Last Fill Quantity: 15 mL,  # refills: 0   Last Office Visit: 3/28/2018   Future Office Visit:    Next 5 appointments (look out 90 days)     May 23, 2018 10:00 AM CDT   Office Visit with Ilene Tristan MD   [facility] ([facility]    [address] [address]   [phone]                   0     Sig: INJECT 12 UNITS SUBCUTANEOUS 3 TIMES DAILY (WITH MEALS)    Short Acting Insulin Protocol Passed    4/16/2018  9:40 AM       Passed - Blood pressure less than 140/90 in past 6 months    BP Readings from Last 3 Encounters:   04/11/18 118/77   03/28/18 106/66   03/08/18 112/73                Passed - LDL on file in past 12 months    Recent Labs   Lab Test  02/21/18   1230   LDL  5            Passed - Microalbumin on file in past 12 months    Recent Labs   Lab Test  10/06/17   1422   MICROL  14   UMALCR  29.65*            Passed - Serum creatinine on file in past 12 months    Recent Labs   Lab Test  04/11/18   1413   CR  0.92            Passed - HgbA1C in past 3 or 6 months    Recent Labs   Lab Test  01/31/18   1428   A1C  7.0*            Passed - Patient is age 18 or older       Passed - Recent (6 mo) or future (30 days) visit within the authorizing provider's specialty    Patient had office visit in the last 6 months or has a visit in the next 30 days with authorizing provider or within the authorizing provider's specialty.  See \"Patient Info\" tab in inbasket, or \"Choose Columns\" in Meds & Orders section of the refill encounter.              "

## 2018-04-16 NOTE — TELEPHONE ENCOUNTER
"Requested Prescriptions   Pending Prescriptions Disp Refills     lisinopril (PRINIVIL/ZESTRIL) 2.5 MG tablet [Pharmacy Med Name: LISINOPRIL 2.5 MG TABLET]  Last Written Prescription Date:  01/16/2018  Last Fill Quantity: 90 tablet,  # refills: 0   Last Office Visit: 3/28/2018   Future Office Visit:    Next 5 appointments (look out 90 days)     May 23, 2018 10:00 AM CDT   Office Visit with Ilene Tristan MD   [facility] ([facility])    [address] [facility] [address] [facility] [address]   [phone]                  90 tablet 0     Sig: TAKE 1 TABLET (2.5 MG) BY MOUTH DAILY    ACE Inhibitors (Including Combos) Protocol Passed    4/14/2018  1:16 AM       Passed - Blood pressure under 140/90 in past 12 months    BP Readings from Last 3 Encounters:   04/11/18 118/77   03/28/18 106/66   03/08/18 112/73                Passed - Recent (12 mo) or future (30 days) visit within the authorizing provider's specialty    Patient had office visit in the last 12 months or has a visit in the next 30 days with authorizing provider or within the authorizing provider's specialty.  See \"Patient Info\" tab in inbasket, or \"Choose Columns\" in Meds & Orders section of the refill encounter.           Passed - Patient is age 18 or older       Passed - No active pregnancy on record       Passed - Normal serum creatinine on file in past 12 months    Recent Labs   Lab Test  04/11/18   1413   CR  0.92            Passed - Normal serum potassium on file in past 12 months    Recent Labs   Lab Test  04/11/18   1413   POTASSIUM  4.3            Passed - No positive pregnancy test in past 12 months          "

## 2018-04-17 RX ORDER — INSULIN ASPART 100 [IU]/ML
INJECTION, SOLUTION INTRAVENOUS; SUBCUTANEOUS
Qty: 15 ML | Refills: 0 | Status: SHIPPED | OUTPATIENT
Start: 2018-04-17 | End: 2018-05-22

## 2018-04-17 NOTE — TELEPHONE ENCOUNTER
Prescription approved per [facility] [place] Refill Protocol.  Sophia Hemphill RN

## 2018-04-18 ENCOUNTER — TELEPHONE (OUTPATIENT)
Dept: FAMILY MEDICINE | Facility: CLINIC | Age: 78
End: 2018-04-18
Payer: MEDICARE

## 2018-04-18 DIAGNOSIS — I48.21 PERMANENT ATRIAL FIBRILLATION (H): ICD-10-CM

## 2018-04-18 DIAGNOSIS — Z79.01 LONG TERM CURRENT USE OF ANTICOAGULANT THERAPY: ICD-10-CM

## 2018-04-18 DIAGNOSIS — I50.30 (HFPEF) HEART FAILURE WITH PRESERVED EJECTION FRACTION (H): ICD-10-CM

## 2018-04-18 LAB — INR POINT OF CARE: 2.7 (ref 0.86–1.14)

## 2018-04-18 PROCEDURE — 99207 ZZC NO CHARGE NURSE ONLY: CPT | Performed by: FAMILY MEDICINE

## 2018-04-18 PROCEDURE — 36416 COLLJ CAPILLARY BLOOD SPEC: CPT | Performed by: FAMILY MEDICINE

## 2018-04-18 PROCEDURE — 80048 BASIC METABOLIC PNL TOTAL CA: CPT | Performed by: NURSE PRACTITIONER

## 2018-04-18 PROCEDURE — 85610 PROTHROMBIN TIME: CPT | Mod: QW | Performed by: FAMILY MEDICINE

## 2018-04-18 NOTE — TELEPHONE ENCOUNTER
ANTICOAGULATION FOLLOW-UP CLINIC VISIT    Patient Name:  Betty Tee  Date:  4/18/2018  Contact Type:  Telephone. Dose instructions left on patient's voice mail    SUBJECTIVE:  Bleeding Signs/Symptoms: None  Thromboembolic Signs/Symptoms: None    Medication Changes:  No  Dietary Changes:  No  Bacterial/Viral Infection:  No    Missed Coumadin Doses:  None  Other Concerns:  No      ASSESSMENT/PLAN:  See: ANTICOAGULATION QIC flow sheet.    INR 2.7  Plan: Continue 11.25 mg M and 7.5mg ROW = 56.25 mg/wk.  Recheck INR in 2 weeks.  Sophia Hemphill RN      Dosage adjustment made based on physician directed care plan.    JIMI VOGT

## 2018-04-19 LAB
ANION GAP SERPL CALCULATED.3IONS-SCNC: 12 MMOL/L (ref 3–14)
BUN SERPL-MCNC: 32 MG/DL (ref 7–30)
CALCIUM SERPL-MCNC: 9.4 MG/DL (ref 8.5–10.1)
CHLORIDE SERPL-SCNC: 101 MMOL/L (ref 94–109)
CO2 SERPL-SCNC: 24 MMOL/L (ref 20–32)
CREAT SERPL-MCNC: 0.9 MG/DL (ref 0.52–1.04)
GFR SERPL CREATININE-BSD FRML MDRD: 60 ML/MIN/1.7M2
GLUCOSE SERPL-MCNC: 221 MG/DL (ref 70–99)
POTASSIUM SERPL-SCNC: 4.3 MMOL/L (ref 3.4–5.3)
SODIUM SERPL-SCNC: 137 MMOL/L (ref 133–144)

## 2018-04-20 ENCOUNTER — CARE COORDINATION (OUTPATIENT)
Dept: CARDIOLOGY | Facility: CLINIC | Age: 78
End: 2018-04-20

## 2018-04-20 NOTE — PROGRESS NOTES
Called Sister Randal to review labs and let her know her kidney function and electrolytes are normal. Did not leave voice mail as it is a group voicemail. Will try again later.

## 2018-04-22 ENCOUNTER — PRE VISIT (OUTPATIENT)
Dept: CARDIOLOGY | Facility: CLINIC | Age: 78
End: 2018-04-22

## 2018-04-26 ENCOUNTER — OFFICE VISIT (OUTPATIENT)
Dept: CARDIOLOGY | Facility: CLINIC | Age: 78
End: 2018-04-26
Attending: INTERNAL MEDICINE
Payer: MEDICARE

## 2018-04-26 VITALS
DIASTOLIC BLOOD PRESSURE: 75 MMHG | HEART RATE: 62 BPM | OXYGEN SATURATION: 95 % | WEIGHT: 211 LBS | SYSTOLIC BLOOD PRESSURE: 110 MMHG | HEIGHT: 67 IN | BODY MASS INDEX: 33.12 KG/M2

## 2018-04-26 DIAGNOSIS — I10 ESSENTIAL HYPERTENSION WITH GOAL BLOOD PRESSURE LESS THAN 140/90: ICD-10-CM

## 2018-04-26 DIAGNOSIS — I48.21 PERMANENT ATRIAL FIBRILLATION (H): ICD-10-CM

## 2018-04-26 DIAGNOSIS — I48.91 ATRIAL FIBRILLATION WITH CONTROLLED VENTRICULAR RESPONSE (H): ICD-10-CM

## 2018-04-26 DIAGNOSIS — I50.30 (HFPEF) HEART FAILURE WITH PRESERVED EJECTION FRACTION (H): Primary | ICD-10-CM

## 2018-04-26 PROCEDURE — G0463 HOSPITAL OUTPT CLINIC VISIT: HCPCS | Mod: ZF

## 2018-04-26 PROCEDURE — 99214 OFFICE O/P EST MOD 30 MIN: CPT | Mod: ZP | Performed by: INTERNAL MEDICINE

## 2018-04-26 ASSESSMENT — PAIN SCALES - GENERAL: PAINLEVEL: NO PAIN (0)

## 2018-04-26 NOTE — PROGRESS NOTES
Follow up HFpEF    HPI:   76 yo F with interstitial lung disease (moderate restriction), Sjogren's syndrome, HTN, permanent atrial fibrillation, diverticulitis s/p colectomy 2011 who I am following for HFpEF. She is here for routine HFpEF follow up.     Prior to the last month, she felt her breathing had been relatively stable. She has now had a cough for the last 2 weeks with associated wheezing and she was placed on antibiotics. She does feel as though this is improving somewhat but she is still bringing up mucous and she has had night sweats. Her weight has been stable. She denies orthopnea/ PND. Her leg swelling is improved from the last we saw her and is now minimal.  She denies chest pain.     She does use oxygen when doing more strenuous physical activity.   .    PAST MEDICAL HISTORY:  Past Medical History:   Diagnosis Date     Alcohol abuse, in remission      Allergic rhinitis, cause unspecified     allegra helps when she takes it     Antiplatelet or antithrombotic long-term use      Atrial fibrillation (H)     in hosp in 11/11 after surgery w/ fluid overload     Cardiomegaly     LVH on stress echo- cardiac w/u at [facility] ER- neg CT scan for PE, neg stress echo in 8/06     Chest pain, unspecified      Disorder of bone and cartilage, unspecified     osteopenia (had been on prempro), improved on 6/06 dexa, stable dexa 11/10     Diverticulosis of colon (without mention of hemorrhage)     last episode yrs ago     Essential hypertension, benign      Gastro-oesophageal reflux disease      Insomnia, unspecified     weaned off clonazepam     Irregular heart beat      Lumbago 7/09    MRI with DJD, now seeing Dr. Cain for sciatic sx's     Major depressive disorder, recurrent episode, moderate (H)      Obstructive sleep apnea      Osteoarthrosis, unspecified whether generalized or localized, unspecified site      Sjogren's syndrome (H)      Sleep apnea      Tobacco use disorder     chantix in 9/07, started again in  6/08, working       FAMILY HISTORY:  Mother had MI and CHF in her 60's. 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      CURRENT MEDICATIONS:    Prescription Medications as of 4/26/2018             acetaminophen (TYLENOL) 500 MG tablet Take 500 mg by mouth nightly as needed     acyclovir (ZOVIRAX) 400 MG tablet Take 400 mg by mouth See Admin Instructions 5 times daily as needed for outbreaks    acyclovir (ZOVIRAX) 5 % ointment Apply topically 6 times daily    albuterol (PROAIR HFA, PROVENTIL HFA, VENTOLIN HFA) 108 (90 BASE) MCG/ACT inhaler Inhale 2 puffs into the lungs every 6 hours    atorvastatin (LIPITOR) 10 MG tablet Take 1 tablet (10 mg) by mouth daily    BASAGLAR 100 UNIT/ML injection Inject 25 Units Subcutaneous daily    blood glucose monitoring (ACCU-CHEK SHANNON PLUS) test strip Use to test blood sugar 4 times daily or as directed.  Ok to substitute alternative if insurance prefers.    blood glucose monitoring (ACCU-CHEK FASTCLIX) lancets Use to test blood sugar 4 times daily or as directed.  Ok to substitute alternative if insurance prefers.    blood glucose monitoring (ACCU-CHEK MULTICLIX) lancets Use to test blood sugar 4 times daily or as directed.    blood glucose monitoring (NO BRAND SPECIFIED) test strip Use to test blood sugars 4 times daily or as directed    COMPRESSION STOCKINGS Wear compression stockings in affected leg (right leg) or both legs most of the time during the day and take them off at night.    escitalopram (LEXAPRO) 10 MG tablet TAKE 1 TABLET (10 MG) BY MOUTH DAILY    fluticasone (FLONASE) 50 MCG/ACT nasal spray Spray 1-2 sprays into both nostrils daily    fluticasone (FLOVENT HFA) 220 MCG/ACT inhaler Inhale 2 puffs into the lungs 2 times daily    insulin pen needle (B-D U/F) 31G X 8 MM Use 4 times daily (with Lantus and Novolog) or as directed.    insulin pen needle (BD JANE U/F) 32G X 4 MM Use 4  daily as directed.    ketoconazole (NIZORAL) 2 % cream Apply topically 2 times daily    LANTUS SOLOSTAR 100 UNIT/ML soln INJECT 25 UNTIS SUBCUTANEOUSLY EVERY DAY    lisinopril (PRINIVIL/ZESTRIL) 2.5 MG tablet TAKE 1 TABLET (2.5 MG) BY MOUTH DAILY    metFORMIN (GLUCOPHAGE-XR) 500 MG 24 hr tablet TAKE 2 TABLETS BY MOUTH DAILY WITH DINNER    metoprolol succinate (TOPROL XL) 25 MG 24 hr tablet Take 0.5 tablets (12.5 mg) by mouth 2 times daily Take 50 mg by mouth in combination with 12.5 for a total of 62.5 twice a day    metoprolol succinate (TOPROL-XL) 100 MG 24 hr tablet Take 50 mg by mouth in combination with 12.5 for a total of 62.5 twice a day    montelukast (SINGULAIR) 10 MG tablet TAKE 1 TABLET BY MOUTH AT BEDTIME    NOVOLOG FLEXPEN 100 UNIT/ML soln INJECT 12 UNITS SUBCUTANEOUS 3 TIMES DAILY (WITH MEALS)    order for DME Oxygen: Patient requires supplemental Oxygen 4 LPM via nasal canula with activity. Please provide patient with a home unit and with portability capability. Oxygen will be for a lifetime.    order for DME Equipment being ordered: Full face mask for CPAP - size: F10 large    potassium chloride SA (K-DUR/KLOR-CON M) 20 MEQ CR tablet Increase KCL to 40 MEQ in AM and 20 MEQ in the evening.    predniSONE (DELTASONE) 10 MG tablet Take 2 tablets for three days (4/12-14), then take 1 tablet daily (10 mg daily starting 4/15)    spironolactone (ALDACTONE) 25 MG tablet Take 2 tablets (50 mg) by mouth daily    torsemide (DEMADEX) 20 MG tablet Take 40 mg in the morning, 30 mg in the afternoon.    traZODone (DESYREL) 50 MG tablet TAKE 1/2-1 TABLET BY MOUTH NIGHTLY AS NEEDED, CAN TITRATE DOWN TO 1/2TAB OR UP TO 2TABS AS NEEDED    warfarin (COUMADIN) 7.5 MG tablet TAKE 1 TABLET BY MOUTH DAILY. CURRENT DOSE IS 7.5 MG DAILY. DOSE ADJUSTED PER INR RESULT.    alendronate (FOSAMAX) 70 MG tablet Take 1 tablet (70 mg) by mouth every 7 days        ROS:   Constitutional: No fever, chills. + sweats No weight gain/loss.  "  ENT: No visual disturbance, ear ache, epistaxis, sore throat.   Allergies/Immunologic: Negative.   Respiratory: + cough, no hemoptysis.   Cardiovascular: As per HPI.   GI: No nausea, vomiting, hematemesis, melena, or hematochezia.   : No urinary frequency, dysuria, or hematuria.   Integument: Negative.   Psychiatric: Negative.   Neuro: Negative.   Endocrinology: Negative.   Musculoskeletal: Negative.    EXAM:  Ht 1.702 m (5' 7\")  Wt 95.7 kg (211 lb)  BMI 33.05 kg/m2  General: appears comfortable, alert and articulate  Head: normocephalic, atraumatic  Eyes: anicteric sclera, EOMI  Neck: no adenopathy  Orophyarynx: moist mucosa, no lesions, dentition intact  Heart: Irregular, S1/S2, no murmur, gallop, rub, estimated JVP <10   Lungs: diffuse expiratory wheezing, no crackles,no dullness   Abdomen: soft, non-tender, bowel sounds present, no hepatosplenomegaly  Extremities: trace LE edema, no clubbing, cyanosis.  Neurological: normal speech and affect, no gross motor deficits    Labs:  CBC RESULTS:  Lab Results   Component Value Date    WBC 15.6 (H) 11/08/2017    RBC 4.59 11/08/2017    HGB 13.4 11/08/2017    HCT 42.1 11/08/2017    MCV 92 11/08/2017    MCH 29.2 11/08/2017    MCHC 31.8 11/08/2017    RDW 16.7 (H) 11/08/2017     11/08/2017       CMP RESULTS:  Lab Results   Component Value Date     04/18/2018    POTASSIUM 4.3 04/18/2018    CHLORIDE 101 04/18/2018    CO2 24 04/18/2018    ANIONGAP 12 04/18/2018     (H) 04/18/2018    BUN 32 (H) 04/18/2018    CR 0.90 04/18/2018    GFRESTIMATED 60 (L) 04/18/2018    GFRESTBLACK 73 04/18/2018    CLAUDY 9.4 04/18/2018    BILITOTAL 0.6 10/06/2017    ALBUMIN 3.1 (L) 10/06/2017    ALKPHOS 121 10/06/2017    ALT 43 10/06/2017    AST 37 10/06/2017        INR RESULTS:  Lab Results   Component Value Date    INR 2.7 (A) 04/18/2018    INR 2.01 (H) 04/11/2018       Lab Results   Component Value Date    MAG 2.0 04/11/2017     Lab Results   Component Value Date    NTBNPI " 3531 (H) 04/06/2017     Lab Results   Component Value Date    NTBNP 804 (H) 04/11/2018     HgA1c 7% Sept 2016    ECG :10/26/16 atrial fibrillation HR 72    48hr holter July 2016- generally controlled atrial fibrillation    July 2016 Complete Portable Echo Adult. Contrast Optison. Optison (NDC #3509-5621-52)  given intravenously. Patient was given 4 ml mixture of 3 ml Optison and 6 ml  saline. 5 ml wasted. Interpretation Summary  Atrial fibrillation  Left ventricular function, chamber size, wall motion, and wall thickness are  normal.The EF is 60-65%. Normal LV size and wall thickness, mild bilateral atrial enlargement  PA pressure cannot be determined  Estimated RA pressure 8 mmHg    Regadenosen MPI 2014: no fixed or inducible defects    Assessment and Plan:  In summary this is a very pleasant 76 year old female with suspected HFpEF who is referred today for further evaluation and treatment.     In support of the diagnosis of HFpEF includes mild LA enlargement, echocardiographic signs of increase LV filling pressures, increased nt-bnp, as well as a diuretic requirement and symptomatic improvement on diuretics.    The risk factors for HFpEF in her include age, gender, HTN, pre-diabetes, sleep apnea and obesity.  A prior stress test was negative for CAD. She is presently euvolemic on exam and is NYHA class III (mutifactorial) I am increasing her diuretics as listed below.     The mainstays of therapy for HFpEF include volume management, blood pressure control, treating underlying sleep apnea if present, weight management, exercise training, rate control for atrial fibrillation as well as consideration for a rhythm control strategy, as well as consideration for clinical trials.  I do have her on spironolactone given the results of the TOPCAT trial. Patients have symptomatically felt very improved on this medication. Her a fib rates have been under better control recently and her ischemia evaluation is negative.      Plan for HFpEF: continue current dose of diuretics, BP control     Other:   Hypertension: controlled     Cough/SOB: on antibiotics. I do not think this is volume based on her exam. I offered a chest x ray today and she may need a steroid pulse +/- nebulizer based on her exam- I have asked her to reach out to PMD or pulmonary next week if her wheezing/cough are not improved.     Af fib:- atrial fibrillation [id number] (age >75, HTN, CHF, gender) - rates controlled on metoprolol     Primary prevention: on ASA, statin      We look forward to seeing Betty Tee in back in  8 months for follow up up.She will see CORE in 4 months       Radha Rosa  [facility]      Director,  [facility] HFpEF Program       CC  Patient Care Team:  Ilene Tristan MD as PCP - [facility] Resident  William Azevedo MD as MD (Internal Medicine)  Kirill Polk MD as MD (Otolaryngology)  Aubrey Hurtado MD as MD (Cardiology)  Jasvir Franco MD as Resident (Internal Medicine)  Danay Payne, RN as Nurse Coordinator (Clinical Cardiac Electrophysiology)  Anderson Perez MD as MD (Clinical Cardiac Electrophysiology)  Radha Rosa MD as MD (Cardiology)  Kiesha Elias APRN CNP as Nurse Practitioner (Cardiology)  Beatriz Blanco, RN as Nurse Coordinator (Cardiology)  KIESHA ELIAS

## 2018-04-26 NOTE — PATIENT INSTRUCTIONS
No changes in medications  Follow up with Kiesha PAK in 6 months with labs  Follow up with Dr. Rosa in one yr with same day Echo and labs      Radha Rosa MD  [phone]   press 1 and then 3

## 2018-04-26 NOTE — NURSING NOTE
Chief Complaint   Patient presents with     Follow Up For     Follow up for HFpEF and AF     Vitals were taken and medications were reconciled.     Yolis Gautam, CHARLES  3:33 PM

## 2018-04-26 NOTE — LETTER
4/26/2018      RE: Betty Tee  [address]  [place] [address]       Dear Colleague,    Thank you for the opportunity to participate in the care of your patient, Betty Tee, at the [facility]. Please see a copy of my visit note below.    Follow up HFpEF    HPI:   78 yo F with interstitial lung disease (moderate restriction), Sjogren's syndrome, HTN, permanent atrial fibrillation, diverticulitis s/p colectomy 2011 who I am following for HFpEF. She is here for routine HFpEF follow up.     Prior to the last month, she felt her breathing had been relatively stable. She has now had a cough for the last 2 weeks with associated wheezing and she was placed on antibiotics. She does feel as though this is improving somewhat but she is still bringing up mucous and she has had night sweats. Her weight has been stable. She denies orthopnea/ PND. Her leg swelling is improved from the last we saw her and is now minimal.  She denies chest pain.     She does use oxygen when doing more strenuous physical activity.   .    PAST MEDICAL HISTORY:  Past Medical History:   Diagnosis Date     Alcohol abuse, in remission      Allergic rhinitis, cause unspecified     allegra helps when she takes it     Antiplatelet or antithrombotic long-term use      Atrial fibrillation (H)     in hosp in 11/11 after surgery w/ fluid overload     Cardiomegaly     LVH on stress echo- cardiac w/u at [facility] ER- neg CT scan for PE, neg stress echo in 8/06     Chest pain, unspecified      Disorder of bone and cartilage, unspecified     osteopenia (had been on prempro), improved on 6/06 dexa, stable dexa 11/10     Diverticulosis of colon (without mention of hemorrhage)     last episode yrs ago     Essential hypertension, benign      Gastro-oesophageal reflux disease      Insomnia, unspecified     weaned off clonazepam     Irregular heart beat      Lumbago 7/09    MRI with DJD, now  seeing Dr. Cain for sciatic sx's     Major depressive disorder, recurrent episode, moderate (H)      Obstructive sleep apnea      Osteoarthrosis, unspecified whether generalized or localized, unspecified site      Sjogren's syndrome (H)      Sleep apnea      Tobacco use disorder     chantix in 9/07, started again in 6/08, working       FAMILY HISTORY:  Mother had MI and CHF in her 60's. 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      CURRENT MEDICATIONS:    Prescription Medications as of 4/26/2018             acetaminophen (TYLENOL) 500 MG tablet Take 500 mg by mouth nightly as needed     acyclovir (ZOVIRAX) 400 MG tablet Take 400 mg by mouth See Admin Instructions 5 times daily as needed for outbreaks    acyclovir (ZOVIRAX) 5 % ointment Apply topically 6 times daily    albuterol (PROAIR HFA, PROVENTIL HFA, VENTOLIN HFA) 108 (90 BASE) MCG/ACT inhaler Inhale 2 puffs into the lungs every 6 hours    atorvastatin (LIPITOR) 10 MG tablet Take 1 tablet (10 mg) by mouth daily    BASAGLAR 100 UNIT/ML injection Inject 25 Units Subcutaneous daily    blood glucose monitoring (ACCU-CHEK SHANNON PLUS) test strip Use to test blood sugar 4 times daily or as directed.  Ok to substitute alternative if insurance prefers.    blood glucose monitoring (ACCU-CHEK FASTCLIX) lancets Use to test blood sugar 4 times daily or as directed.  Ok to substitute alternative if insurance prefers.    blood glucose monitoring (ACCU-CHEK MULTICLIX) lancets Use to test blood sugar 4 times daily or as directed.    blood glucose monitoring (NO BRAND SPECIFIED) test strip Use to test blood sugars 4 times daily or as directed    COMPRESSION STOCKINGS Wear compression stockings in affected leg (right leg) or both legs most of the time during the day and take them off at night.    escitalopram (LEXAPRO) 10 MG tablet TAKE 1 TABLET (10 MG) BY MOUTH DAILY    fluticasone  (FLONASE) 50 MCG/ACT nasal spray Spray 1-2 sprays into both nostrils daily    fluticasone (FLOVENT HFA) 220 MCG/ACT inhaler Inhale 2 puffs into the lungs 2 times daily    insulin pen needle (B-D U/F) 31G X 8 MM Use 4 times daily (with Lantus and Novolog) or as directed.    insulin pen needle (BD JANE U/F) 32G X 4 MM Use 4 daily as directed.    ketoconazole (NIZORAL) 2 % cream Apply topically 2 times daily    LANTUS SOLOSTAR 100 UNIT/ML soln INJECT 25 UNTIS SUBCUTANEOUSLY EVERY DAY    lisinopril (PRINIVIL/ZESTRIL) 2.5 MG tablet TAKE 1 TABLET (2.5 MG) BY MOUTH DAILY    metFORMIN (GLUCOPHAGE-XR) 500 MG 24 hr tablet TAKE 2 TABLETS BY MOUTH DAILY WITH DINNER    metoprolol succinate (TOPROL XL) 25 MG 24 hr tablet Take 0.5 tablets (12.5 mg) by mouth 2 times daily Take 50 mg by mouth in combination with 12.5 for a total of 62.5 twice a day    metoprolol succinate (TOPROL-XL) 100 MG 24 hr tablet Take 50 mg by mouth in combination with 12.5 for a total of 62.5 twice a day    montelukast (SINGULAIR) 10 MG tablet TAKE 1 TABLET BY MOUTH AT BEDTIME    NOVOLOG FLEXPEN 100 UNIT/ML soln INJECT 12 UNITS SUBCUTANEOUS 3 TIMES DAILY (WITH MEALS)    order for DME Oxygen: Patient requires supplemental Oxygen 4 LPM via nasal canula with activity. Please provide patient with a home unit and with portability capability. Oxygen will be for a lifetime.    order for DME Equipment being ordered: Full face mask for CPAP - size: F10 large    potassium chloride SA (K-DUR/KLOR-CON M) 20 MEQ CR tablet Increase KCL to 40 MEQ in AM and 20 MEQ in the evening.    predniSONE (DELTASONE) 10 MG tablet Take 2 tablets for three days (4/12-14), then take 1 tablet daily (10 mg daily starting 4/15)    spironolactone (ALDACTONE) 25 MG tablet Take 2 tablets (50 mg) by mouth daily    torsemide (DEMADEX) 20 MG tablet Take 40 mg in the morning, 30 mg in the afternoon.    traZODone (DESYREL) 50 MG tablet TAKE 1/2-1 TABLET BY MOUTH NIGHTLY AS NEEDED, CAN TITRATE DOWN  "TO 1/2TAB OR UP TO 2TABS AS NEEDED    warfarin (COUMADIN) 7.5 MG tablet TAKE 1 TABLET BY MOUTH DAILY. CURRENT DOSE IS 7.5 MG DAILY. DOSE ADJUSTED PER INR RESULT.    alendronate (FOSAMAX) 70 MG tablet Take 1 tablet (70 mg) by mouth every 7 days        ROS:   Constitutional: No fever, chills. + sweats No weight gain/loss.   ENT: No visual disturbance, ear ache, epistaxis, sore throat.   Allergies/Immunologic: Negative.   Respiratory: + cough, no hemoptysis.   Cardiovascular: As per HPI.   GI: No nausea, vomiting, hematemesis, melena, or hematochezia.   : No urinary frequency, dysuria, or hematuria.   Integument: Negative.   Psychiatric: Negative.   Neuro: Negative.   Endocrinology: Negative.   Musculoskeletal: Negative.    EXAM:  Ht 1.702 m (5' 7\")  Wt 95.7 kg (211 lb)  BMI 33.05 kg/m2  General: appears comfortable, alert and articulate  Head: normocephalic, atraumatic  Eyes: anicteric sclera, EOMI  Neck: no adenopathy  Orophyarynx: moist mucosa, no lesions, dentition intact  Heart: Irregular, S1/S2, no murmur, gallop, rub, estimated JVP <10   Lungs: diffuse expiratory wheezing, no crackles,no dullness   Abdomen: soft, non-tender, bowel sounds present, no hepatosplenomegaly  Extremities: trace LE edema, no clubbing, cyanosis.  Neurological: normal speech and affect, no gross motor deficits    Labs:  CBC RESULTS:  Lab Results   Component Value Date    WBC 15.6 (H) 11/08/2017    RBC 4.59 11/08/2017    HGB 13.4 11/08/2017    HCT 42.1 11/08/2017    MCV 92 11/08/2017    MCH 29.2 11/08/2017    MCHC 31.8 11/08/2017    RDW 16.7 (H) 11/08/2017     11/08/2017       CMP RESULTS:  Lab Results   Component Value Date     04/18/2018    POTASSIUM 4.3 04/18/2018    CHLORIDE 101 04/18/2018    CO2 24 04/18/2018    ANIONGAP 12 04/18/2018     (H) 04/18/2018    BUN 32 (H) 04/18/2018    CR 0.90 04/18/2018    GFRESTIMATED 60 (L) 04/18/2018    GFRESTBLACK 73 04/18/2018    CLAUDY 9.4 04/18/2018    BILITOTAL 0.6 10/06/2017 "    ALBUMIN 3.1 (L) 10/06/2017    ALKPHOS 121 10/06/2017    ALT 43 10/06/2017    AST 37 10/06/2017        INR RESULTS:  Lab Results   Component Value Date    INR 2.7 (A) 04/18/2018    INR 2.01 (H) 04/11/2018       Lab Results   Component Value Date    MAG 2.0 04/11/2017     Lab Results   Component Value Date    NTBNPI 3531 (H) 04/06/2017     Lab Results   Component Value Date    NTBNP 804 (H) 04/11/2018     HgA1c 7% Sept 2016    ECG :10/26/16 atrial fibrillation HR 72    48hr holter July 2016- generally controlled atrial fibrillation    July 2016 Complete Portable Echo Adult. Contrast Optison. Optison (NDC #2489-5982-98)  given intravenously. Patient was given 4 ml mixture of 3 ml Optison and 6 ml  saline. 5 ml wasted. Interpretation Summary  Atrial fibrillation  Left ventricular function, chamber size, wall motion, and wall thickness are  normal.The EF is 60-65%. Normal LV size and wall thickness, mild bilateral atrial enlargement  PA pressure cannot be determined  Estimated RA pressure 8 mmHg    Regadenosen MPI 2014: no fixed or inducible defects    Assessment and Plan:  In summary this is a very pleasant 76 year old female with suspected HFpEF who is referred today for further evaluation and treatment.     In support of the diagnosis of HFpEF includes mild LA enlargement, echocardiographic signs of increase LV filling pressures, increased nt-bnp, as well as a diuretic requirement and symptomatic improvement on diuretics.    The risk factors for HFpEF in her include age, gender, HTN, pre-diabetes, sleep apnea and obesity.  A prior stress test was negative for CAD. She is presently euvolemic on exam and is NYHA class III (mutifactorial) I am increasing her diuretics as listed below.     The mainstays of therapy for HFpEF include volume management, blood pressure control, treating underlying sleep apnea if present, weight management, exercise training, rate control for atrial fibrillation as well as consideration for  a rhythm control strategy, as well as consideration for clinical trials.  I do have her on spironolactone given the results of the TOPCAT trial. Patients have symptomatically felt very improved on this medication. Her a fib rates have been under better control recently and her ischemia evaluation is negative.     Plan for HFpEF: continue current dose of diuretics, BP control     Other:   Hypertension: controlled     Cough/SOB: on antibiotics. I do not think this is volume based on her exam. I offered a chest x ray today and she may need a steroid pulse +/- nebulizer based on her exam- I have asked her to reach out to PMD or pulmonary next week if her wheezing/cough are not improved.     Af fib:- atrial fibrillation [id number] (age >75, HTN, CHF, gender) - rates controlled on metoprolol     Primary prevention: on ASA, statin      We look forward to seeing Betty Tee in back in  8 months for follow up up.She will see CORE in 4 months       Radha Rosa  [facility]      Director,  [facility] HFpEF Program       CC  Patient Care Team:  Ilene Tristan MD as PCP - General  ChristianaCareBecky MD as Resident  William Azevedo MD as MD (Internal Medicine)  Kirill Polk MD as MD (Otolaryngology)  Aubrey Hurtado MD as MD (Cardiology)  Jasvir Franco MD as Resident (Internal Medicine)  Danay Payne, RN as Nurse Coordinator (Clinical Cardiac Electrophysiology)  Anderson Perez MD as MD (Clinical Cardiac Electrophysiology)  Radha Rosa MD as MD (Cardiology)  Kiesha Elias, LIZY CNP as Nurse Practitioner (Cardiology)  Beatriz Blanco, RN as Nurse Coordinator (Cardiology)  KIESHA ELIAS

## 2018-04-26 NOTE — MR AVS SNAPSHOT
After Visit Summary   4/26/2018    Betty Tee    MRN: [id number]           Patient Information     Date Of Birth          1940        Visit Information        Provider Department      4/26/2018 3:30 PM Radah Rosa MD [facility] Heart Care        Today's Diagnoses     (HFpEF) heart failure with preserved ejection fraction (H)    -  1    Permanent atrial fibrillation (H)        Essential hypertension with goal blood pressure less than 140/90          Care Instructions    No changes in medications  Follow up with Kiesha PAK in 6 months with labs  Follow up with Dr. Rosa in one yr with same day Echo and labs      Radha Rosa MD  [phone]   press 1 and then 3          Follow-ups after your visit        Additional Services     Follow-Up with [facility]       Kiesha PAK with labs            Follow-Up with Advanced Heart Failure Cardiologist                 Your next 10 appointments already scheduled     May 01, 2018  1:30 PM CDT   Return Sleep Patient with LIZY Donaldson [facility] [place], Sleep Study ([facility])    [address] [place] [address] [phone]            May 23, 2018 10:00 AM CDT   Office Visit with Ilene Tristan MD   [facility] ([facility])    [address] [place] [address] [phone]           Bring a current list of meds and any records pertaining to this visit. For Physicals, please bring immunization records and any forms needing to be filled out. Please arrive 10 minutes early to complete paperwork.            Jun 07, 2018  4:00 PM CDT   (Arrive by 3:45 PM)   Return Visit with Carmenza Stringer MD   [facility] Rheumatology ([facility])    [address]  [address]  [facility] [address] [phone]            Jul 18, 2018 12:30 PM CDT   SIX MINUTE WALK with SHAYY PFL A,  PFL 6  MINUTE WALK 1   [facility] Pulmonary Function Testing ([facility])    [address]  3rd Floor  [facility] [address]   [phone]            Jul 18, 2018  1:30 PM CDT   (Arrive by 1:15 PM)   Return Interstitial Lung with Meño Walsh MD   [facility] ([facility])    [address]  [address]  [facility] [address]   [phone]            Oct 17, 2018 12:00 PM CDT   Lab with  LAB    Health Lab ([facility])    [address]  1st Floor  [facility] [address]   [phone]            Oct 17, 2018 12:30 PM CDT   (Arrive by 12:15 PM)   CORE RETURN with LIZY Tyler CNP   [facility] ([facility])    [address]  [address]  [facility] [address] [phone]              Future tests that were ordered for you today     Open Future Orders        [facility] Expected Expires Ordered    Follow-Up with Advanced Heart Failure Cardiologist Routine 4/1/2019 7/1/2019 4/26/2018    Follow-Up with [facility] Routine 10/26/2018 12/1/2018 4/26/2018    Echocardiogram Complete Routine 4/1/2019 4/26/2019 4/26/2018            Who to contact     If you have questions or need follow up information about today's clinic visit or your schedule please contact [facility] directly at [phone].  Normal or non-critical lab and imaging results will be communicated to you by MyChart, letter or phone within 4 business days after the clinic has received the results. If you do not hear from us within 7 days, please contact the clinic through [employer] or phone. If you have a critical or abnormal lab result, we will notify you by phone as soon as possible.  Submit refill requests through [employer] or call your pharmacy and they will forward the refill request to us. Please allow 3 business days for your refill to be completed.          Additional  "Information About Your Visit        MyChart Information     [employer] gives you secure access to your electronic health record. If you see a primary care provider, you can also send messages to your care team and make appointments. If you have questions, please call your primary care clinic.  If you do not have a primary care provider, please call [phone] and they will assist you.        Care EveryWhere ID     This is your Care EveryWhere ID. This could be used by other organizations to access your [facility] [place] medical records  [id number]        Your Vitals Were     Pulse Height Pulse Oximetry BMI (Body Mass Index)          62 1.702 m (5' 7\") 95% 33.05 kg/m2         Blood Pressure from Last 3 Encounters:   04/26/18 110/75   04/11/18 118/77   03/28/18 106/66    Weight from Last 3 Encounters:   04/26/18 95.7 kg (211 lb)   04/11/18 95.7 kg (211 lb)   03/28/18 97.2 kg (214 lb 3.2 oz)                 Today's Medication Changes          These changes are accurate as of 4/26/18  4:02 PM.  If you have any questions, ask your nurse or doctor.               These medicines have changed or have updated prescriptions.        Dose/Directions    acyclovir 5 % ointment   Commonly known as:  ZOVIRAX   This may have changed:  additional instructions   Used for:  Recurrent cold sores        Apply topically 6 times daily   Quantity:  15 g   Refills:  3       fluticasone 50 MCG/ACT spray   Commonly known as:  FLONASE   This may have changed:    - when to take this  - reasons to take this   Used for:  Seasonal allergic rhinitis        Dose:  1-2 spray   Spray 1-2 sprays into both nostrils daily   Quantity:  16 g   Refills:  5                Primary Care Provider Office Phone # Fax #    Ilene Tristan [phone] [address]  [phone]  [facility] [address]        Equal Access to Services     GENNY STONER AH: Gilda Trammell, waaxda luqadaha, qaybta kaalmada adeegyada, anderson sherwood " heidestas lylekonrad la'aan ah. So [facility] [phone].    ATENCIÓN: Si jewel covarrubias, tiene a conti disposición servicios gratuitos de asistencia lingüística. Noel al [phone].    We comply with applicable federal civil rights laws and [place] laws. We do not discriminate on the basis of race, color, national origin, age, disability, sex, sexual orientation, or gender identity.            Thank you!     Thank you for choosing [facility]  for your care. Our goal is always to provide you with excellent care. Hearing back from our patients is one way we can continue to improve our services. Please take a few minutes to complete the written survey that you may receive in the mail after your visit with us. Thank you!             Your Updated Medication List - Protect others around you: Learn how to safely use, store and throw away your medicines at www.disposemymeds.org.          This list is accurate as of 4/26/18  4:02 PM.  Always use your most recent med list.                   Brand Name Dispense Instructions for use Diagnosis    acyclovir 400 MG tablet    ZOVIRAX     Take 400 mg by mouth See Admin Instructions 5 times daily as needed for outbreaks        acyclovir 5 % ointment    ZOVIRAX    15 g    Apply topically 6 times daily    Recurrent cold sores       albuterol 108 (90 Base) MCG/ACT Inhaler    PROAIR HFA/PROVENTIL HFA/VENTOLIN HFA    1 Inhaler    Inhale 2 puffs into the lungs every 6 hours    ILD (interstitial lung disease) (H)       alendronate 70 MG tablet    FOSAMAX    4 tablet    Take 1 tablet (70 mg) by mouth every 7 days    Other osteoporosis without current pathological fracture       atorvastatin 10 MG tablet    LIPITOR    90 tablet    Take 1 tablet (10 mg) by mouth daily    Hyperlipidemia LDL goal <100       * blood glucose monitoring lancets     4 Box    Use to test blood sugar 4 times daily or as directed.    Type 2 diabetes mellitus without complication, without long-term current use of insulin  (H)       * blood glucose monitoring lancets     1 Box    Use to test blood sugar 4 times daily or as directed.  Ok to substitute alternative if insurance prefers.    Type 2 diabetes mellitus without complication, without long-term current use of insulin (H)       * blood glucose monitoring test strip    no brand specified    300 strip    Use to test blood sugars 4 times daily or as directed    Type 2 diabetes mellitus without complication, without long-term current use of insulin (H)       * blood glucose monitoring test strip    ACCU-CHEK SHANNON PLUS    120 strip    Use to test blood sugar 4 times daily or as directed.  Ok to substitute alternative if insurance prefers.    Type 2 diabetes mellitus without complication, without long-term current use of insulin (H)       COMPRESSION STOCKINGS     2 each    Wear compression stockings in affected leg (right leg) or both legs most of the time during the day and take them off at night.    DVT (deep venous thrombosis), right, Postphlebitic syndrome, Chronic anticoagulation, Atrial fibrillation (H)       escitalopram 10 MG tablet    LEXAPRO    90 tablet    TAKE 1 TABLET (10 MG) BY MOUTH DAILY    Major depressive disorder, recurrent episode, moderate (H)       fluticasone 220 MCG/ACT Inhaler    FLOVENT HFA    3 Inhaler    Inhale 2 puffs into the lungs 2 times daily    ILD (interstitial lung disease) (H), Follicular bronchiolitis (H)       fluticasone 50 MCG/ACT spray    FLONASE    16 g    Spray 1-2 sprays into both nostrils daily    Seasonal allergic rhinitis       * insulin pen needle 31G X 8 MM    B-D U/F    400 each    Use 4 times daily (with Lantus and Novolog) or as directed.    Type 2 diabetes mellitus without complication, without long-term current use of insulin (H)       * insulin pen needle 32G X 4 MM    BD JANE U/F    200 each    Use 4 daily as directed.    Type 2 diabetes mellitus without complication, without long-term current use of insulin (H)        ketoconazole 2 % cream    NIZORAL    60 g    Apply topically 2 times daily    Cutaneous candidiasis       * LANTUS SOLOSTAR 100 UNIT/ML injection   Generic drug:  insulin glargine     15 mL    INJECT 25 UNTIS SUBCUTANEOUSLY EVERY DAY    Type 2 diabetes mellitus with hyperglycemia, with long-term current use of insulin (H)       * BASAGLAR 100 UNIT/ML injection     7.5 mL    Inject 25 Units Subcutaneous daily    Type 2 diabetes mellitus with hyperglycemia, with long-term current use of insulin (H)       lisinopril 2.5 MG tablet    PRINIVIL/Zestril    90 tablet    TAKE 1 TABLET (2.5 MG) BY MOUTH DAILY    Essential hypertension with goal blood pressure less than 140/90       metFORMIN 500 MG 24 hr tablet    GLUCOPHAGE-XR    180 tablet    TAKE 2 TABLETS BY MOUTH DAILY WITH DINNER    Type 2 diabetes mellitus without complication, without long-term current use of insulin (H)       * metoprolol succinate 100 MG 24 hr tablet    TOPROL-XL    90 tablet    Take 50 mg by mouth in combination with 12.5 for a total of 62.5 twice a day    Atrial fibrillation with controlled ventricular response (H)       * metoprolol succinate 25 MG 24 hr tablet    TOPROL XL    30 tablet    Take 0.5 tablets (12.5 mg) by mouth 2 times daily Take 50 mg by mouth in combination with 12.5 for a total of 62.5 twice a day    (HFpEF) heart failure with preserved ejection fraction (H), Persistent atrial fibrillation (H)       montelukast 10 MG tablet    SINGULAIR    90 tablet    TAKE 1 TABLET BY MOUTH AT BEDTIME    Sjogren's syndrome with lung involvement (H), ILD (interstitial lung disease) (H), Chronic seasonal allergic rhinitis due to other allergen       NovoLOG FLEXPEN 100 UNIT/ML injection   Generic drug:  insulin aspart     15 mL    INJECT 12 UNITS SUBCUTANEOUS 3 TIMES DAILY (WITH MEALS)    Type 2 diabetes mellitus with other specified complication (H)       * order for DME     1 Device    Oxygen: Patient requires supplemental Oxygen 4 LPM via nasal  canula with activity. Please provide patient with a home unit and with portability capability. Oxygen will be for a lifetime.    Hypoxia, ILD (interstitial lung disease) (H)       * order for DME     1 each    Equipment being ordered: Full face mask for CPAP - size: F10 large    ANGELICA (obstructive sleep apnea)       potassium chloride SA 20 MEQ CR tablet    K-DUR/KLOR-CON M    180 tablet    Increase KCL to 40 MEQ in AM and 20 MEQ in the evening.    (HFpEF) heart failure with preserved ejection fraction (H)       predniSONE 10 MG tablet    DELTASONE    90 tablet    Take 2 tablets for three days (4/12-14), then take 1 tablet daily (10 mg daily starting 4/15)    ILD (interstitial lung disease) (H), Sjogren's syndrome with lung involvement (H)       spironolactone 25 MG tablet    ALDACTONE    180 tablet    Take 2 tablets (50 mg) by mouth daily    Chronic diastolic congestive heart failure (H)       torsemide 20 MG tablet    DEMADEX    105 tablet    Take 40 mg in the morning, 30 mg in the afternoon.    (HFpEF) heart failure with preserved ejection fraction (H)       traZODone 50 MG tablet    DESYREL    180 tablet    TAKE 1/2-1 TABLET BY MOUTH NIGHTLY AS NEEDED, CAN TITRATE DOWN TO 1/2TAB OR UP TO 2TABS AS NEEDED    Insomnia due to medical condition       TYLENOL 500 MG tablet   Generic drug:  acetaminophen      Take 500 mg by mouth nightly as needed    Dizziness       warfarin 7.5 MG tablet    COUMADIN    90 tablet    TAKE 1 TABLET BY MOUTH DAILY. CURRENT DOSE IS 7.5 MG DAILY. DOSE ADJUSTED PER INR RESULT.    Atrial fibrillation with controlled ventricular response (H)       * Notice:  This list has 12 medication(s) that are the same as other medications prescribed for you. Read the directions carefully, and ask your doctor or other care provider to review them with you.

## 2018-04-27 RX ORDER — WARFARIN SODIUM 7.5 MG/1
TABLET ORAL
Qty: 90 TABLET | Refills: 0 | Status: SHIPPED | OUTPATIENT
Start: 2018-04-27 | End: 2018-05-23

## 2018-04-27 NOTE — TELEPHONE ENCOUNTER
"Requested Prescriptions   Pending Prescriptions Disp Refills     warfarin (COUMADIN) 7.5 MG tablet [Pharmacy Med Name: WARFARIN SODIUM 7.5 MG TABLET] 90 tablet 0     Sig: TAKE 1 TABLET BY MOUTH DAILY. CURRENT DOSE IS 7.5 MG DAILY. DOSE ADJUSTED PER INR RESULT.    Vitamin K Antagonists Failed    4/26/2018  8:53 PM       Failed - INR is within goal in the past 6 weeks    Confirm INR is within goal in the past 6 weeks.     Recent Labs   Lab Test 04/18/18   INR  2.7*                      Passed - Recent (12 mo) or future (30 days) visit within the authorizing provider's specialty    Patient had office visit in the last 12 months or has a visit in the next 30 days with authorizing provider or within the authorizing provider's specialty.  See \"Patient Info\" tab in inbasket, or \"Choose Columns\" in Meds & Orders section of the refill encounter.           Passed - Patient is 18 years of age or older       Passed - Patient is not pregnant       Passed - No positive pregnancy on file in past 12 months        "

## 2018-04-27 NOTE — TELEPHONE ENCOUNTER
Prescription approved per [facility] Refill Protocol.  Lydia HALEY RN

## 2018-05-01 ENCOUNTER — OFFICE VISIT (OUTPATIENT)
Dept: SLEEP MEDICINE | Facility: CLINIC | Age: 78
End: 2018-05-01
Payer: MEDICARE

## 2018-05-01 VITALS
RESPIRATION RATE: 18 BRPM | DIASTOLIC BLOOD PRESSURE: 72 MMHG | HEIGHT: 67 IN | OXYGEN SATURATION: 94 % | HEART RATE: 79 BPM | BODY MASS INDEX: 33.12 KG/M2 | WEIGHT: 211 LBS | SYSTOLIC BLOOD PRESSURE: 101 MMHG

## 2018-05-01 DIAGNOSIS — E66.811 OBESITY (BMI 30.0-34.9): ICD-10-CM

## 2018-05-01 DIAGNOSIS — F51.8 DISRUPTED SLEEP-WAKE CYCLE: Primary | ICD-10-CM

## 2018-05-01 DIAGNOSIS — G47.20 DISRUPTED SLEEP-WAKE CYCLE: Primary | ICD-10-CM

## 2018-05-01 DIAGNOSIS — G47.33 OSA (OBSTRUCTIVE SLEEP APNEA): ICD-10-CM

## 2018-05-01 PROCEDURE — 99214 OFFICE O/P EST MOD 30 MIN: CPT | Performed by: NURSE PRACTITIONER

## 2018-05-01 NOTE — MR AVS SNAPSHOT
After Visit Summary   5/1/2018    Betty Tee    MRN: [id number]           Patient Information     Date Of Birth          1940        Visit Information        Provider Department      5/1/2018 1:30 PM Magali Green APRN CNP [facility], Sleep Study        Today's Diagnoses     Restless legs syndrome (RLS)        ANGELICA (obstructive sleep apnea)          Care Instructions    I'll contact Haydee Deras in [place]    Go to [employer]-Mask fit-excessive    Then 2 wks of use    Visit either phone or face to face   4wks face to face                Follow-ups after your visit        Follow-up notes from your care team     Return in about 4 weeks (around 5/29/2018) for [facility] first, download 2 wks after fit and return.      Your next 10 appointments already scheduled     May 23, 2018 10:00 AM CDT   Office Visit with Ilene Tristan MD   [facility] ([facility] [place])    [address] [id number] [phone]           Bring a current list of meds and any records pertaining to this visit. For Physicals, please bring immunization records and any forms needing to be filled out. Please arrive 10 minutes early to complete paperwork.            May 31, 2018  2:00 PM CDT   Return Sleep Patient with LIZY Donaldson CNP   [facility], Sleep Study ([facility])    [address] [place] [id number]   [phone]            Jun 07, 2018  4:00 PM CDT   (Arrive by 3:45 PM)   Return Visit with Carmenza Stringer MD   [facility] Rheumatology ([facility])    [address]  [address]  [place] [address] [phone]            Jul 18, 2018 12:30 PM CDT   SIX MINUTE WALK with UC PFL A, UC PFL 6 MINUTE WALK 1   [facility] Pulmonary Function Testing ([facility])    [address]  3rd Floor  [place] [address]    [phone]            Jul 18, 2018  1:30 PM CDT   (Arrive by 1:15 PM)   Return Interstitial Lung with Meño Walsh MD   [facility] ([facility])    [address]  [address]  [place] [address]   [phone]            Oct 17, 2018 12:00 PM CDT   Lab with SHAYY LAB   [facility] ([facility])    [address]  1st Floor  [place] [facility] [id number] [phone]            Oct 17, 2018 12:30 PM CDT   (Arrive by 12:15 PM)   CORE RETURN with LIZY Tyler CNP   [facility] ([facility])    [address]  [address]  [place] [address] [phone]              Who to contact     If you have questions or need follow up information about today's clinic visit or your schedule please [facility] directly at [phone].  Normal or non-critical lab and imaging results will be communicated to you by K[employer], letter or phone within 4 business days after the clinic has received the results. If you do not hear from us within 7 days, please contact the clinic through [employer] or phone. If you have a critical or abnormal lab result, we will notify you by phone as soon as possible.  Submit refill requests through [employer] or call your pharmacy and they will forward the refill request to us. Please allow 3 business days for your refill to be completed.          Additional Information About Your Visit        [employer] Information     [employer] gives you secure access to your electronic health record. If you see a primary care provider, you can also send messages to your care team and make appointments. If you have questions, please call your primary care clinic.  If you do not have a primary care provider, please call [phone] and they will assist you.        Care EveryWhere ID     This is your Care EveryWhere ID. This could be used by other  "organizations to access your [facility] medical records  [id number]        Your Vitals Were     Pulse Respirations Height Pulse Oximetry BMI (Body Mass Index)       79 18 1.702 m (5' 7\") 94% 33.05 kg/m2        Blood Pressure from Last 3 Encounters:   05/01/18 101/72   04/26/18 110/75   04/11/18 118/77    Weight from Last 3 Encounters:   05/01/18 95.7 kg (211 lb)   04/26/18 95.7 kg (211 lb)   04/11/18 95.7 kg (211 lb)              We Performed the Following     [facility]LE[place]AL[place] & MANAGEMENT REFERRAL - ADULT -[facility] [phone]  (Age 18 and up)          Today's Medication Changes          These changes are accurate as of 5/1/18  2:47 PM.  If you have any questions, ask your nurse or doctor.               These medicines have changed or have updated prescriptions.        Dose/Directions    acyclovir 5 % ointment   Commonly known as:  ZOVIRAX   This may have changed:  additional instructions   Used for:  Recurrent cold sores        Apply topically 6 times daily   Quantity:  15 g   Refills:  3       fluticasone 50 MCG/ACT spray   Commonly known as:  FLONASE   This may have changed:    - when to take this  - reasons to take this   Used for:  Seasonal allergic rhinitis        Dose:  1-2 spray   Spray 1-2 sprays into both nostrils daily   Quantity:  16 g   Refills:  5                Primary Care Provider Office Phone # Fax #    Ilene Tristan [phone] [address] [address]        Equal Access to Services     GENNY STONER AH: Hadii jossue ku nehemiaso Soomaali, waaxda luqadaha, qaybta kaalmada anderson tinajero. So [facility] [phone].    ATENCIÓN: Si habla español, tiene a conti disposición servicios gratuitos de asistencia lingüística. Llame al [phone].    We comply with applicable federal civil rights laws and [place] laws. We do not discriminate on the basis of race, color, national " origin, age, disability, sex, sexual orientation, or gender identity.            Thank you!     Thank you for choosing [facility] [place]  for your care. Our goal is always to provide you with excellent care. Hearing back from our patients is one way we can continue to improve our services. Please take a few minutes to complete the written survey that you may receive in the mail after your visit with us. Thank you!             Your Updated Medication List - Protect others around you: Learn how to safely use, store and throw away your medicines at www.disposemymeds.org.          This list is accurate as of 5/1/18  2:47 PM.  Always use your most recent med list.                   Brand Name Dispense Instructions for use Diagnosis    acyclovir 400 MG tablet    ZOVIRAX     Take 400 mg by mouth See Admin Instructions 5 times daily as needed for outbreaks        acyclovir 5 % ointment    ZOVIRAX    15 g    Apply topically 6 times daily    Recurrent cold sores       albuterol 108 (90 Base) MCG/ACT Inhaler    PROAIR HFA/PROVENTIL HFA/VENTOLIN HFA    1 Inhaler    Inhale 2 puffs into the lungs every 6 hours    ILD (interstitial lung disease) (H)       alendronate 70 MG tablet    FOSAMAX    4 tablet    Take 1 tablet (70 mg) by mouth every 7 days    Other osteoporosis without current pathological fracture       atorvastatin 10 MG tablet    LIPITOR    90 tablet    Take 1 tablet (10 mg) by mouth daily    Hyperlipidemia LDL goal <100       * blood glucose monitoring lancets     4 Box    Use to test blood sugar 4 times daily or as directed.    Type 2 diabetes mellitus without complication, without long-term current use of insulin (H)       * blood glucose monitoring lancets     1 Box    Use to test blood sugar 4 times daily or as directed.  Ok to substitute alternative if insurance prefers.    Type 2 diabetes mellitus without complication, without long-term current use of insulin (H)       * blood glucose monitoring test  strip    no brand specified    300 strip    Use to test blood sugars 4 times daily or as directed    Type 2 diabetes mellitus without complication, without long-term current use of insulin (H)       * blood glucose monitoring test strip    ACCU-CHEK SHANNON PLUS    120 strip    Use to test blood sugar 4 times daily or as directed.  Ok to substitute alternative if insurance prefers.    Type 2 diabetes mellitus without complication, without long-term current use of insulin (H)       COMPRESSION STOCKINGS     2 each    Wear compression stockings in affected leg (right leg) or both legs most of the time during the day and take them off at night.    DVT (deep venous thrombosis), right, Postphlebitic syndrome, Chronic anticoagulation, Atrial fibrillation (H)       escitalopram 10 MG tablet    LEXAPRO    90 tablet    TAKE 1 TABLET (10 MG) BY MOUTH DAILY    Major depressive disorder, recurrent episode, moderate (H)       fluticasone 220 MCG/ACT Inhaler    FLOVENT HFA    3 Inhaler    Inhale 2 puffs into the lungs 2 times daily    ILD (interstitial lung disease) (H), Follicular bronchiolitis (H)       fluticasone 50 MCG/ACT spray    FLONASE    16 g    Spray 1-2 sprays into both nostrils daily    Seasonal allergic rhinitis       * insulin pen needle 31G X 8 MM    B-D U/F    400 each    Use 4 times daily (with Lantus and Novolog) or as directed.    Type 2 diabetes mellitus without complication, without long-term current use of insulin (H)       * insulin pen needle 32G X 4 MM    BD JANE U/F    200 each    Use 4 daily as directed.    Type 2 diabetes mellitus without complication, without long-term current use of insulin (H)       ketoconazole 2 % cream    NIZORAL    60 g    Apply topically 2 times daily    Cutaneous candidiasis       * LANTUS SOLOSTAR 100 UNIT/ML injection   Generic drug:  insulin glargine     15 mL    INJECT 25 UNTIS SUBCUTANEOUSLY EVERY DAY    Type 2 diabetes mellitus with hyperglycemia, with long-term current  use of insulin (H)       * BASAGLAR 100 UNIT/ML injection     7.5 mL    Inject 25 Units Subcutaneous daily    Type 2 diabetes mellitus with hyperglycemia, with long-term current use of insulin (H)       lisinopril 2.5 MG tablet    PRINIVIL/Zestril    90 tablet    TAKE 1 TABLET (2.5 MG) BY MOUTH DAILY    Essential hypertension with goal blood pressure less than 140/90       metFORMIN 500 MG 24 hr tablet    GLUCOPHAGE-XR    180 tablet    TAKE 2 TABLETS BY MOUTH DAILY WITH DINNER    Type 2 diabetes mellitus without complication, without long-term current use of insulin (H)       * metoprolol succinate 100 MG 24 hr tablet    TOPROL-XL    90 tablet    Take 50 mg by mouth in combination with 12.5 for a total of 62.5 twice a day    Atrial fibrillation with controlled ventricular response (H)       * metoprolol succinate 25 MG 24 hr tablet    TOPROL XL    30 tablet    Take 0.5 tablets (12.5 mg) by mouth 2 times daily Take 50 mg by mouth in combination with 12.5 for a total of 62.5 twice a day    (HFpEF) heart failure with preserved ejection fraction (H), Persistent atrial fibrillation (H)       montelukast 10 MG tablet    SINGULAIR    90 tablet    TAKE 1 TABLET BY MOUTH AT BEDTIME    Sjogren's syndrome with lung involvement (H), ILD (interstitial lung disease) (H), Chronic seasonal allergic rhinitis due to other allergen       NovoLOG FLEXPEN 100 UNIT/ML injection   Generic drug:  insulin aspart     15 mL    INJECT 12 UNITS SUBCUTANEOUS 3 TIMES DAILY (WITH MEALS)    Type 2 diabetes mellitus with other specified complication (H)       * order for DME     1 Device    Oxygen: Patient requires supplemental Oxygen 4 LPM via nasal canula with activity. Please provide patient with a home unit and with portability capability. Oxygen will be for a lifetime.    Hypoxia, ILD (interstitial lung disease) (H)       * order for DME     1 each    Equipment being ordered: Full face mask for CPAP - size: F10 large    ANGELICA (obstructive sleep  apnea)       potassium chloride SA 20 MEQ CR tablet    K-DUR/KLOR-CON M    180 tablet    Increase KCL to 40 MEQ in AM and 20 MEQ in the evening.    (HFpEF) heart failure with preserved ejection fraction (H)       predniSONE 10 MG tablet    DELTASONE    90 tablet    Take 2 tablets for three days (4/12-14), then take 1 tablet daily (10 mg daily starting 4/15)    ILD (interstitial lung disease) (H), Sjogren's syndrome with lung involvement (H)       spironolactone 25 MG tablet    ALDACTONE    180 tablet    Take 2 tablets (50 mg) by mouth daily    Chronic diastolic congestive heart failure (H)       torsemide 20 MG tablet    DEMADEX    105 tablet    Take 40 mg in the morning, 30 mg in the afternoon.    (HFpEF) heart failure with preserved ejection fraction (H)       traZODone 50 MG tablet    DESYREL    180 tablet    TAKE 1/2-1 TABLET BY MOUTH NIGHTLY AS NEEDED, CAN TITRATE DOWN TO 1/2TAB OR UP TO 2TABS AS NEEDED    Insomnia due to medical condition       TYLENOL 500 MG tablet   Generic drug:  acetaminophen      Take 500 mg by mouth nightly as needed    Dizziness       warfarin 7.5 MG tablet    COUMADIN    90 tablet    TAKE 1 TABLET BY MOUTH DAILY. CURRENT DOSE IS 7.5 MG DAILY. DOSE ADJUSTED PER INR RESULT.    Atrial fibrillation with controlled ventricular response (H)       * Notice:  This list has 12 medication(s) that are the same as other medications prescribed for you. Read the directions carefully, and ask your doctor or other care provider to review them with you.

## 2018-05-01 NOTE — PATIENT INSTRUCTIONS
I'll contact Haydee Deras in [place]    Go to [employer]-Mask fit-excessive    Then 2 wks of use    Visit either phone or face to face   4wks face to face

## 2018-05-01 NOTE — PROGRESS NOTES
Cc: Patient returns here today in follow up for mild ANGELICA with hypoventilation syndrome    HPI: Baseline symptoms were: snoring, restless sleep   .  PSG 2/1/16 AHI 11.4 with hypoventilation syndrome with rise in TCM of 15 mm Hg to 57.  Baseline was CO2 was 44, time < 89% was 3.4 mins. AI 93.5, PLMI 153.5, PLM AI 58.4, treatement PLM AI 43.6.  The patient underwent an all night titration study 2/4/16 with Bilevel PAP as the CPAP trial during the previous split night study was not effective in treating hypoventilation.This all night titration study achieved correction of her ANGELICA and hypoventilation at pressure settings of 24/14 cmH2O. PLM AI 7.7.Comorbidities: sjogren's syndrome with ILD, afib, see below.  Sleep comorbidities RLS, hx of dx of insomnia    New concerns: mask difficulties-dme is apria, mask has to be held onto with her hand,fell back, hit head, nocturia 4-6 x/night with lasik, often taking lasik bedtime, lifestyle includes late afternoon meetings, groups.    Sleep Review of Systems: 2-3 pillows under head, sob if laid flat     Snoring, snort arousals, gasping while on therapy: no     Bedpartner c/o's of cpap: leak     Heated humidity problems with rainout/excessive dryness, sore throat: too dry,       Opening of mouth while on therapy/excessive leak: full face-fits     Swallowing air: no     Skin problems: some breakout not now     Insufficient sleep, devoting <7 or more hours to sleep: no     Problems falling or staying asleep with cpap: difficulty with leaks, some choice to not use bipap     RLS: better with tx of sleep disorder breathing    02 tanks for chf    SLEEP SCHEDULE: not getting enough on pap therapy  Bedtime: 10:15 10:30                  Range:                  Sleep Latency:2-3 Am with frequent nocturia  Wakeups: 3 (also states 4-6x/night) primarily due to nocturia-  Once or twice will replace mask, then may leave it off  Refall < 10  Final wakeup:8:30    Naps: : no    Response to  therapy:  Willingness to continue: yes    Equipment issues: mask size-is this right?    Download today: unable to download, could view on computer maybe 6/30 days with usage >4 hrs  Of those 6-approx 5 or6 hrs of sleep on pap therapy, detail not available    Full report -need download from [facility]  Allergies:    Allergies   Allergen Reactions     Augmentin Nausea and Vomiting     Codeine Nausea and Vomiting     Phenobarbital Itching       Medications:    Current Outpatient Prescriptions   Medication Sig Dispense Refill     acetaminophen (TYLENOL) 500 MG tablet Take 500 mg by mouth nightly as needed        acyclovir (ZOVIRAX) 400 MG tablet Take 400 mg by mouth See Admin Instructions 5 times daily as needed for outbreaks       acyclovir (ZOVIRAX) 5 % ointment Apply topically 6 times daily (Patient taking differently: Apply topically 6 times daily As needed for outbreaks) 15 g 3     albuterol (PROAIR HFA, PROVENTIL HFA, VENTOLIN HFA) 108 (90 BASE) MCG/ACT inhaler Inhale 2 puffs into the lungs every 6 hours 1 Inhaler 3     alendronate (FOSAMAX) 70 MG tablet Take 1 tablet (70 mg) by mouth every 7 days (Patient not taking: Reported on 4/26/2018) 4 tablet 11     atorvastatin (LIPITOR) 10 MG tablet Take 1 tablet (10 mg) by mouth daily 90 tablet 0     BASAGLAR 100 UNIT/ML injection Inject 25 Units Subcutaneous daily 7.5 mL 2     blood glucose monitoring (ACCU-CHEK SHANNON PLUS) test strip Use to test blood sugar 4 times daily or as directed.  Ok to substitute alternative if insurance prefers. 120 strip 11     blood glucose monitoring (ACCU-CHEK FASTCLIX) lancets Use to test blood sugar 4 times daily or as directed.  Ok to substitute alternative if insurance prefers. 1 Box 11     blood glucose monitoring (ACCU-CHEK MULTICLIX) lancets Use to test blood sugar 4 times daily or as directed. 4 Box 3     blood glucose monitoring (NO BRAND SPECIFIED) test strip Use to test blood sugars 4 times daily or as directed 300 strip 3      COMPRESSION STOCKINGS Wear compression stockings in affected leg (right leg) or both legs most of the time during the day and take them off at night. 2 each 2     escitalopram (LEXAPRO) 10 MG tablet TAKE 1 TABLET (10 MG) BY MOUTH DAILY 90 tablet 0     fluticasone (FLONASE) 50 MCG/ACT nasal spray Spray 1-2 sprays into both nostrils daily (Patient taking differently: Spray 1-2 sprays into both nostrils daily as needed for allergies ) 16 g 5     fluticasone (FLOVENT HFA) 220 MCG/ACT inhaler Inhale 2 puffs into the lungs 2 times daily 3 Inhaler 3     insulin pen needle (B-D U/F) 31G X 8 MM Use 4 times daily (with Lantus and Novolog) or as directed. 400 each 3     insulin pen needle (BD JANE U/F) 32G X 4 MM Use 4 daily as directed. 200 each 11     ketoconazole (NIZORAL) 2 % cream Apply topically 2 times daily 60 g 1     LANTUS SOLOSTAR 100 UNIT/ML soln INJECT 25 UNTIS SUBCUTANEOUSLY EVERY DAY 15 mL 0     lisinopril (PRINIVIL/ZESTRIL) 2.5 MG tablet TAKE 1 TABLET (2.5 MG) BY MOUTH DAILY 90 tablet 0     metFORMIN (GLUCOPHAGE-XR) 500 MG 24 hr tablet TAKE 2 TABLETS BY MOUTH DAILY WITH DINNER 180 tablet 0     metoprolol succinate (TOPROL XL) 25 MG 24 hr tablet Take 0.5 tablets (12.5 mg) by mouth 2 times daily Take 50 mg by mouth in combination with 12.5 for a total of 62.5 twice a day 30 tablet 3     metoprolol succinate (TOPROL-XL) 100 MG 24 hr tablet Take 50 mg by mouth in combination with 12.5 for a total of 62.5 twice a day 90 tablet 3     montelukast (SINGULAIR) 10 MG tablet TAKE 1 TABLET BY MOUTH AT BEDTIME 90 tablet 1     NOVOLOG FLEXPEN 100 UNIT/ML soln INJECT 12 UNITS SUBCUTANEOUS 3 TIMES DAILY (WITH MEALS) 15 mL 0     order for DME Oxygen: Patient requires supplemental Oxygen 4 LPM via nasal canula with activity. Please provide patient with a home unit and with portability capability. Oxygen will be for a lifetime. 1 Device 0     order for DME Equipment being ordered: Full face mask for CPAP - size: F10 large 1 each  0     potassium chloride SA (K-DUR/KLOR-CON M) 20 MEQ CR tablet Increase KCL to 40 MEQ in AM and 20 MEQ in the evening. 180 tablet 3     predniSONE (DELTASONE) 10 MG tablet Take 2 tablets for three days (4/12-14), then take 1 tablet daily (10 mg daily starting 4/15) 90 tablet 3     spironolactone (ALDACTONE) 25 MG tablet Take 2 tablets (50 mg) by mouth daily 180 tablet 3     torsemide (DEMADEX) 20 MG tablet Take 40 mg in the morning, 30 mg in the afternoon. 105 tablet 3     traZODone (DESYREL) 50 MG tablet TAKE 1/2-1 TABLET BY MOUTH NIGHTLY AS NEEDED, CAN TITRATE DOWN TO 1/2TAB OR UP TO 2TABS AS NEEDED 180 tablet 0     warfarin (COUMADIN) 7.5 MG tablet TAKE 1 TABLET BY MOUTH DAILY. CURRENT DOSE IS 7.5 MG DAILY. DOSE ADJUSTED PER INR RESULT. 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 Alvarado.  She was first diagnosed with A.fib in 10/2011 during an inpatient stay for diverticulitis which ultimately required partial colectomy, Her A.fib at that time presented w/ RVR and pulmonary edema. She was treated with IV metoprolol and diuretics, followied by diltiazem and ultimately discharged on warfarin. Aside from pulmonary edema, A.fib was not symptomatic. TTE 10/2011 showed LVEF=55-60%, diastolic dysfunction/high filling pressures with no other significant structural nidus for A.fib. She had paroxysmal A.fib subsequently after discharge from that admission. LifeWatch event monitor 1/21/13-2/3/13 showed 1 episode of palpitations, correlated with 1 episode of A.fib lasting 4 hrs with VR up to 132. More recently, her A.fib has been present on all ECGs and has been felt to be persistent/permanent. She has been managed with a rate control strategy and anticoagulation with warfarin. Her prior therapies have included diltiazem CD  (stopped earlier this year.)       Diverticulitis of colon 09/24/2004     [facility]: High     Multiple complications, surgeries, and hospitalizations from ~9/11-12/11 (see 12/09/11 note for overview).  A.fib started during with fluid overload during one of these hospitalizations.  Colostomy takedown in 2/12.  F/u colonoscopy in 12/12 (Dr. Canseco) looked good- rec next f/u colonoscopy in 10 yrs.           Inflammatory arthritis 11/09/2017     [facility]: Medium     Long term current use of anticoagulant therapy 10/05/2017     [facility]: Medium     Hyperlipidemia LDL goal <100 05/22/2017     [facility]: Medium     Lipitor 40mg/d started in 4/17 (with DM dx, but LDL too low at 39 in 6/17, so lowered dose to 20mg/d in 8/17).  2/18 results- LDL down at '5'- will discuss lowering lipitor dose again at next visit **.       Heart failure, chronic, with acute decompensation (H) 04/08/2017     [facility]: Medium     Type 2 diabetes mellitus with hyperglycemia, with long-term current use of insulin (H) 10/24/2016     [facility]: Medium     Dx in 11/16 with A1C of 7.0.  Increased to 9's in 1/17 (started on metformin), then to 14's in 4/17 during hospital stay for acute on chronic respiratory failure (started on insulin then- lantus and novolog with meals).    Lipitor 40mg/d also started in 4/17 in hospital (with LDL of 56).  Pt also working on diet, lost ~30 lbs since dx of DM.     6/22/17- LDL back at 13- will discuss backing down on lipitor to 10mg/day at next visit.**       (HFpEF) heart failure with preserved ejection fraction (H) 08/27/2016     [facility]: Medium     7/16 Cardiology notes- She was admitted again 7/2-7/4/16  with hypoxemia, which was felt to be related to HFpEF. She was noted in this setting to be in asymptomatic A.fib, CS=[id number]. Toprol XL was increased to 50 mg daily. As she had shown no evidence of recurrent GI bleeding, warfarin was resumed on discharge. She was scheduled for EP follow up to address A.fib. Of  note, she was also diagnosed with ANGELICA and started on CPAP at this time which she has been using regularly.  Assessment- Sister Sita continues to experience NYHA class II-III exertional dyspnea and appears modestly hypervolemic on exam today. We have instructed her to increase her furosemide to 40 mg qAM/20mg qPM for the next 2 days, and arranged for her to establish care with [facility] within 1 week with BMP at that time.       Acute and chronic respiratory failure with hypoxia (H) 07/02/2016     [facility]: Medium     Lower GI bleed 06/26/2016     [facility]: Medium     Hospitalized 6/26-28/16 with [facility].  Colonoscopy with Dr. Siddiqi intended for after hospital stay, but pt had multiple complications including readmission on 7/2/16 for respiratory failure due to fluid overload and uncontrolled a.fib.  Hgb dropped from 13's to 9's, then to low of 8.5 during second hospitalization.  Colonoscopy f/u not done due to medical complications.  Hgb's slowly improving though not back to baseline (10's).  12/16 consult with Dr. Siddiqi- ludwin of pt's ability to safely tolerate a colonoscopy or colon surgery.  Rec considering cologuard test to stratify her risk- will discuss with pt at upcoming visit/s.  Discussed- pt is not interested in any testing- would not act on the results if cancer.       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  Followed by [facility] and rheumatology.  Worsening PFT's in 10/15 despite increase in prednisone from 5mg/d to 10mg/d.    ILD conference- thought Follicular bronchiolitis -started on flovent, azithromycin and montelukast in 4/16.  4/17- off home O2 (had been on for activity) since 4/17 hospitalization txt of pneumonia/influenza/acute respiratory failure.       ANGELICA (obstructive sleep apnea) 04/12/2016     [facility]: Medium     Sleep study in '16, mild sleep apnea, but significant sleep  "hypoventilation.  Started on CPAP, not helpful, so put on bilevel PAP through Dr. Chandler.       Sjogren's syndrome with lung involvement (H) 10/30/2015     [facility]: Medium     Dx in '15 by rheum- likely primary Sjogrens syndrome- 'with borderline positive lip bx, low titer RG, low titer SSA ab, sicca symptoms, interstitial lung disease w/ reticular opacities in lungs- paratracheal lymphadenopathy, elevated CRP'.    Rheum rec txt per pulmonary, rec f/u with rheum in 4/16.   Sx's likely since '12 with migrating joint pains, seen by rheum in '12 (Dr. Ray), dx with inflammatory jt disease, on 0-5mg of prednisone since then with minimal f/u.  Worsening SOB since early '15, lung lesions, PFT's with 50% decreased function- referred to pulmonary.  Has been on more stable prednisone 5mg/d for few months prior to 10/15 f/u PFT's stable, which remained stable.  Will cont f/u through pulmonary and rheum.           Enlarged lymph node 08/04/2015     [facility]: Medium     Gastroesophageal reflux disease without esophagitis 07/23/2015     [facility]: Medium     PPI started in 11/11 (when hospitalized for colonic abscess).  Increased to BID in 1/8/16- saw pulmonologist, noted at visit: \"GERD: continues to have heart burn, advised patient to increase PPI from once per day to BID\"       Neck mass 04/28/2015     [facility]: Medium     4/7/15 CT scan- prominent lymph nodes- rec f/u clinically, or f/u CT scan in 3-6 months.  Also noted apical lung changes- air trapping vs interstitial pulmonary edema (referred to pulmonary- dx with Sjogren's after seen by rheumatology).  6/15 CT scan- stable neck node, indeterminant- no f/u rec.  Possible related to the Sjogren's.    8/15- FNA of neck mass by ENT, Dr. Polk, no signs of malignancy/lymphoma.  F/u with 9/15 with Dr. Polk - focus on lip/cheek bx's.         Rib pain 10/31/2014     [facility]: Medium     S/p severe fall down stairs in 8/14- hospitalized for a few days, then in rehab for a " couple weeks.  Significant swelling, no fx's.  INR dropped for a bit between hosp/rehab, and pt developed DVT in 9/14 in R leg.       Fatty liver disease, nonalcoholic 09/24/2014     [facility]: Medium     DVT (deep venous thrombosis) (H) 09/15/2014     [facility]: Medium     Dx on 9/11/14 via u/s- worsening sx's over prior few days.  10/14 hematology consult- felt that the DVT was not a coumadin failure- attributed to immobility s/p fall, lower INR during hospitalization/rehab stay.    Rec continued long-term coumadin therapy (due to a.fib), f/u LE u/s in 3-6- months (nl f/u u/s in 4/15), and compression stockings x 2 yrs (pt compliant).       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       Allergic reaction caused by a drug - likely plaquenil 04/30/2013     [facility]: Medium     Pt seen at [facility] 5/13/13- plan to do prednisone 7.5mg daily until able to wean down (Dr. Kevin Marie).  Blood tests q3 months.       Joint pains 01/08/2013     [facility]: Medium     Inflam jt issue- shoulder, hp, generalized jt pains resolved with prednisone- dx with Sjogrens - initial dx pallendromic rheumatism vs pseudogout per rheum- stabilized on prednisone.       Insomnia 05/26/2012     [facility]: Medium     Left ventricular hypertrophy 11/04/2011     [facility]: Medium     LVH on stress echo- cardiac w/u at [facility] ER- neg CT scan for PE, neg stress echo in 8/06        Sciatica 11/03/2010     [facility]: Medium     Was seeing Dr. Conklin (PMNR) - was on neurontin and zanaflex at night- helping her sleep.  Last MRI in ~6/09.  Improved after 11/11 surgery- stopped meds.       Essential hypertension with goal blood pressure less than 140/90 10/19/2010     [facility]: Medium     Lisinopril, lasix, spironolactone, toprol XL.  Diltiazem stopped in 12/15 due to dizziness (discussed with Dr. Hurtado- switched to toprol XL).       Major depressive disorder, recurrent episode, moderate (H)      [facility]: Medium      Lexapro 10mg/d.  10/17- went from paxil 10mg/d to lexapro 10mg/d.  Pt had been on paxil 10mg/d for years- increased to 20mg/d in 8/14.    Trial off in 10/15 (concerned with wt gain).  Also tried off in 5/09, restarted 7/09.    Weaned off clonazepam.           Restless legs syndrome (RLS) 09/06/2007     [facility]: Medium     Health Care Home 02/09/2012     [facility]: Low              Advanced directives, counseling/discussion 11/01/2011     [facility]: Low     Received outside advance directive.  HCD:Previously signed by patient and notarized by [employer].  scanned into EMR as Advance Directive/Living Will document. View document and details in Code Status History Report. Please see advance directive for specifics.   Health care directive (FIVE WISHES) reviewed and documented.  5/10/12 MALIKA Aguilar LPN        Received outside DNR form.    scanned and placed behind media tab.  Please see DNR form for specifics. Routed to certified facilitator for review and further documentation.  DNR form reviewed and documented.  11/1/11 MALIKA Aguilar LPN  In media tab under date 10/14/11.  Lev Tristan MD           Disorder of bone and cartilage 09/24/2004     [facility]: Low     Osteopenia in '10, recheck '15.  Risk factors of PPI use and prednisone use since '12 (0-5mg/d).  Should check Vit D levels as well.       Osteoarthritis 09/24/2004     [facility]: Low     Problem list name updated by automated process. Provider to review       Alcohol abuse, in remission 09/24/2004     [facility]: Low     Temporomandibular joint disorder 11/24/2003     [facility]: Low     Problem list name updated by automated process. Provider to review          Past Medical/Surgical History:  Past Medical History:   Diagnosis Date     Alcohol abuse, in remission      Allergic rhinitis, cause unspecified     allegra helps when she takes it     Antiplatelet or antithrombotic long-term use      Atrial fibrillation (H)     in hosp in  11/11 after surgery w/ fluid overload     Cardiomegaly     LVH on stress echo- cardiac w/u at [facility] ER- neg CT scan for PE, neg stress echo in 8/06     Chest pain, unspecified      Disorder of bone and cartilage, unspecified     osteopenia (had been on prempro), improved on 6/06 dexa, stable dexa 11/10     Diverticulosis of colon (without mention of hemorrhage)     last episode yrs ago     Essential hypertension, benign      Gastro-oesophageal reflux disease      Insomnia, unspecified     weaned off clonazepam     Irregular heart beat      Lumbago 7/09    MRI with [facility], now seeing Dr. Cain for sciatic sx's     Major depressive disorder, recurrent episode, moderate (H)      Obstructive sleep apnea      Osteoarthrosis, unspecified whether generalized or localized, unspecified site      Sjogren's syndrome (H)      Sleep apnea      Tobacco use disorder     chantix in 9/07, started again in 6/08, working     Past Surgical History:   Procedure Laterality Date     BACK SURGERY  1962     BIOPSY BREAST  9/27/02    Biopsy Left Breast     C APPENDECTOMY  1970's?     C NONSPECIFIC PROCEDURE  11/05    exploratory abd lap, adhesions, resolved RLQ pain, diverticulitis episodes     CARDIAC SURGERY       CHOLECYSTECTOMY  1990's?     COLECTOMY LEFT  11/7/2011    Procedure:COLECTOMY LEFT; Laparoscopic mobilization of splenic flexture, sigmoid colectomy, coloprotoscopy, loop illeostomy; Surgeon:CK CASTANEDA; Location:[facility]     HYSTERECTOMY TOTAL ABDOMINAL, BILATERAL SALPINGO-OOPHORECTOMY, COMBINED  11/7/2011    Procedure:COMBINED HYSTERECTOMY TOTAL ABDOMINAL, BILATERAL SALPINGO-OOPHORECTOMY; total abdominal hysterectomy, bilateral salpingo-oophorectomy; Surgeon:ALETA MANUEL; Location:[facility]     INSERT STENT URETER  11/7/2011    Procedure:INSERT STENT URETER; Placement of Bilateral Ureteral Stents ; Surgeon:PRANEETH BRYANT; Location:[facility] OR     SIGMOIDOSCOPY FLEXIBLE  11/3/2011    Procedure:SIGMOIDOSCOPY FLEXIBLE; Flexible  "Sigmoidoscopy; Surgeon:CK CASTANEDA; Location:[facility]     TAKEDOWN ILEOSTOMY  2/1/2012    Procedure:TAKEDOWN ILEOSTOMY; Takedown Loop Ileostomy ; Surgeon:CK CASTANEDA; Location:[facility] OR           Physical Examination:  Vitals: /72  Pulse 79  Resp 18  Ht 1.702 m (5' 7\")  Wt 95.7 kg (211 lb)  SpO2 94%  BMI 33.05 kg/m2  BMI= Body mass index is 33.05 kg/(m^2).         [place] Total Score 5/1/2018   Total score - [place] 5       GENERAL APPEARANCE: alert and mild distress    10/17/17 cardiac echo: Interpretation Summary  Technically difficult study. Poor acoustic windows.  Global and regional left ventricular function is normal with an EF of 60-65%.  Mild right ventricular dilation is present. Global right ventricular function  is normal.  Pulmonary artery systolic pressure is normal.  The inferior vena cava is normal. Estimated mean right atrial pressure is 3  mmHg.  No pericardial effusion is present.    6/17 PFTs: Narrative   The FEV1 and FVC are reduced but the FEV1/FVC ratio is normal.    The diffusing capacity is normal.     IMPRESSION:  Reduced FEV1 and FVC with normal FEV1/FVC ratio and historically normal TLC last measured on 03/2017, suggestive of a non-specific pattern that can be related to obesity, or at times can be seen with early reactive airway disease.  Normal diffusion capacity.         Assessment/Plan: last seen 4/25/16 with problems with leak/fit, but showing good response to bipap.  Reports recently that she fell over and hit head, now in afib all the time.  Problems with getting good fit on mask (mask approx 4 months old-has to hold her hand on mask to sleep with it) and lasix routine is keeping her from sleeping through the night.  Is dyspneic when she initially puts on mask-could be effort related with getting ready for bed.  1. ashwin with hypoventilation syndrome: apria for mask fit, then 2 wks of use, then need download.  Will connect with phone visit to see what we can change.  " Will need overnight ox once there is a fit that is better than requiring hand all night on interface.  2. Sleep disruption: CHF: HFpEF-will add Mikaela Deras and Dr. Rosa to note-can a timing adjustment be made with diuretic for Ms. ESQUIVEL'Shaneka to improve sleep quality and continuity?  Please page  3. Obesity: unchanged.    LIZY Anderson, CNP-BC  Sleep Medicine  [phone] (pager)  Time spent with patient is 25 minutes, of which >50% was spent in counseling, education and coordination of care.

## 2018-05-01 NOTE — LETTER
5/1/2018        RE: Betty T Randal  [address]  [facility] [address]        Cc: Patient returns here today in follow up for mild ANGELICA with hypoventilation syndrome    HPI: Baseline symptoms were: snoring, restless sleep   .  PSG 2/1/16 AHI 11.4 with hypoventilation syndrome with rise in TCM of 15 mm Hg to 57.  Baseline was CO2 was 44, time < 89% was 3.4 mins. AI 93.5, PLMI 153.5, PLM AI 58.4, treatement PLM AI 43.6.  The patient underwent an all night titration study 2/4/16 with Bilevel PAP as the CPAP trial during the previous split night study was not effective in treating hypoventilation.This all night titration study achieved correction of her ANGELICA and hypoventilation at pressure settings of 24/14 cmH2O. PLM AI 7.7.Comorbidities: sjogren's syndrome with ILD, afib, see below.  Sleep comorbidities RLS, hx of dx of insomnia    New concerns: mask difficulties-dme is apria, mask has to be held onto with her hand,fell back, hit head, nocturia 4-6 x/night with lasik, often taking lasik bedtime, lifestyle includes late afternoon meetings, groups.    Sleep Review of Systems: 2-3 pillows under head, sob if laid flat     Snoring, snort arousals, gasping while on therapy: no     Bedpartner c/o's of cpap: leak     Heated humidity problems with rainout/excessive dryness, sore throat: too dry,       Opening of mouth while on therapy/excessive leak: full face-fits     Swallowing air: no     Skin problems: some breakout not now     Insufficient sleep, devoting <7 or more hours to sleep: no     Problems falling or staying asleep with cpap: difficulty with leaks, some choice to not use bipap     RLS: better with tx of sleep disorder breathing    02 tanks for chf    SLEEP SCHEDULE: not getting enough on pap therapy  Bedtime: 10:15 10:30                  Range:                  Sleep Latency:2-3 Am with frequent nocturia  Wakeups: 3 (also states 4-6x/night) primarily due to nocturia-  Once or twice will replace mask,  then may leave it off  Refall < 10  Final wakeup:8:30    Naps: : no    Response to therapy:  Willingness to continue: yes    Equipment issues: mask size-is this right?    Download today: unable to download, could view on computer maybe 6/30 days with usage >4 hrs  Of those 6-approx 5 or6 hrs of sleep on pap therapy, detail not available    Full report -need download from [facility].  Allergies:    Allergies   Allergen Reactions     Augmentin Nausea and Vomiting     Codeine Nausea and Vomiting     Phenobarbital Itching       Medications:    Current Outpatient Prescriptions   Medication Sig Dispense Refill     acetaminophen (TYLENOL) 500 MG tablet Take 500 mg by mouth nightly as needed        acyclovir (ZOVIRAX) 400 MG tablet Take 400 mg by mouth See Admin Instructions 5 times daily as needed for outbreaks       acyclovir (ZOVIRAX) 5 % ointment Apply topically 6 times daily (Patient taking differently: Apply topically 6 times daily As needed for outbreaks) 15 g 3     albuterol (PROAIR HFA, PROVENTIL HFA, VENTOLIN HFA) 108 (90 BASE) MCG/ACT inhaler Inhale 2 puffs into the lungs every 6 hours 1 Inhaler 3     alendronate (FOSAMAX) 70 MG tablet Take 1 tablet (70 mg) by mouth every 7 days (Patient not taking: Reported on 4/26/2018) 4 tablet 11     atorvastatin (LIPITOR) 10 MG tablet Take 1 tablet (10 mg) by mouth daily 90 tablet 0     BASAGLAR 100 UNIT/ML injection Inject 25 Units Subcutaneous daily 7.5 mL 2     blood glucose monitoring (ACCU-CHEK SHANNON PLUS) test strip Use to test blood sugar 4 times daily or as directed.  Ok to substitute alternative if insurance prefers. 120 strip 11     blood glucose monitoring (ACCU-CHEK FASTCLIX) lancets Use to test blood sugar 4 times daily or as directed.  Ok to substitute alternative if insurance prefers. 1 Box 11     blood glucose monitoring (ACCU-CHEK MULTICLIX) lancets Use to test blood sugar 4 times daily or as directed. 4 Box 3     blood glucose monitoring (NO BRAND SPECIFIED) test  strip Use to test blood sugars 4 times daily or as directed 300 strip 3     COMPRESSION STOCKINGS Wear compression stockings in affected leg (right leg) or both legs most of the time during the day and take them off at night. 2 each 2     escitalopram (LEXAPRO) 10 MG tablet TAKE 1 TABLET (10 MG) BY MOUTH DAILY 90 tablet 0     fluticasone (FLONASE) 50 MCG/ACT nasal spray Spray 1-2 sprays into both nostrils daily (Patient taking differently: Spray 1-2 sprays into both nostrils daily as needed for allergies ) 16 g 5     fluticasone (FLOVENT HFA) 220 MCG/ACT inhaler Inhale 2 puffs into the lungs 2 times daily 3 Inhaler 3     insulin pen needle (B-D U/F) 31G X 8 MM Use 4 times daily (with Lantus and Novolog) or as directed. 400 each 3     insulin pen needle (BD JANE U/F) 32G X 4 MM Use 4 daily as directed. 200 each 11     ketoconazole (NIZORAL) 2 % cream Apply topically 2 times daily 60 g 1     LANTUS SOLOSTAR 100 UNIT/ML soln INJECT 25 UNTIS SUBCUTANEOUSLY EVERY DAY 15 mL 0     lisinopril (PRINIVIL/ZESTRIL) 2.5 MG tablet TAKE 1 TABLET (2.5 MG) BY MOUTH DAILY 90 tablet 0     metFORMIN (GLUCOPHAGE-XR) 500 MG 24 hr tablet TAKE 2 TABLETS BY MOUTH DAILY WITH DINNER 180 tablet 0     metoprolol succinate (TOPROL XL) 25 MG 24 hr tablet Take 0.5 tablets (12.5 mg) by mouth 2 times daily Take 50 mg by mouth in combination with 12.5 for a total of 62.5 twice a day 30 tablet 3     metoprolol succinate (TOPROL-XL) 100 MG 24 hr tablet Take 50 mg by mouth in combination with 12.5 for a total of 62.5 twice a day 90 tablet 3     montelukast (SINGULAIR) 10 MG tablet TAKE 1 TABLET BY MOUTH AT BEDTIME 90 tablet 1     NOVOLOG FLEXPEN 100 UNIT/ML soln INJECT 12 UNITS SUBCUTANEOUS 3 TIMES DAILY (WITH MEALS) 15 mL 0     order for DME Oxygen: Patient requires supplemental Oxygen 4 LPM via nasal canula with activity. Please provide patient with a home unit and with portability capability. Oxygen will be for a lifetime. 1 Device 0     order for  DME Equipment being ordered: Full face mask for CPAP - size: F10 large 1 each 0     potassium chloride SA (K-DUR/KLOR-CON M) 20 MEQ CR tablet Increase KCL to 40 MEQ in AM and 20 MEQ in the evening. 180 tablet 3     predniSONE (DELTASONE) 10 MG tablet Take 2 tablets for three days (4/12-14), then take 1 tablet daily (10 mg daily starting 4/15) 90 tablet 3     spironolactone (ALDACTONE) 25 MG tablet Take 2 tablets (50 mg) by mouth daily 180 tablet 3     torsemide (DEMADEX) 20 MG tablet Take 40 mg in the morning, 30 mg in the afternoon. 105 tablet 3     traZODone (DESYREL) 50 MG tablet TAKE 1/2-1 TABLET BY MOUTH NIGHTLY AS NEEDED, CAN TITRATE DOWN TO 1/2TAB OR UP TO 2TABS AS NEEDED 180 tablet 0     warfarin (COUMADIN) 7.5 MG tablet TAKE 1 TABLET BY MOUTH DAILY. CURRENT DOSE IS 7.5 MG DAILY. DOSE ADJUSTED PER INR RESULT. 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 Alvarado.  She was first diagnosed with A.fib in 10/2011 during an inpatient stay for diverticulitis which ultimately required partial colectomy, Her A.fib at that time presented w/ RVR and pulmonary edema. She was treated with IV metoprolol and diuretics, followied by diltiazem and ultimately discharged on warfarin. Aside from pulmonary edema, A.fib was not symptomatic. TTE 10/2011 showed LVEF=55-60%, diastolic dysfunction/high filling pressures with no other significant structural nidus for A.fib. She had paroxysmal A.fib subsequently after discharge from that admission. Giphytch event monitor 1/21/13-2/3/13 showed 1 episode of palpitations, correlated with 1 episode of A.fib lasting 4 hrs with VR up to 132. More recently, her A.fib has been present on all ECGs and has been felt to be persistent/permanent. She has been managed with a rate control strategy and  anticoagulation with warfarin. Her prior therapies have included diltiazem CD (stopped earlier this year.)       Diverticulitis of colon 09/24/2004     [facility]: High     Multiple complications, surgeries, and hospitalizations from ~9/11-12/11 (see 12/09/11 note for overview).  A.fib started during with fluid overload during one of these hospitalizations.  Colostomy takedown in 2/12.  F/u colonoscopy in 12/12 (Dr. Canseco) looked good- rec next f/u colonoscopy in 10 yrs.           Inflammatory arthritis 11/09/2017     [facility]: Medium     Long term current use of anticoagulant therapy 10/05/2017     [facility]: Medium     Hyperlipidemia LDL goal <100 05/22/2017     [facility]: Medium     Lipitor 40mg/d started in 4/17 (with DM dx, but LDL too low at 39 in 6/17, so lowered dose to 20mg/d in 8/17).  2/18 results- LDL down at '5'- will discuss lowering lipitor dose again at next visit **.       Heart failure, chronic, with acute decompensation (H) 04/08/2017     [facility]: Medium     Type 2 diabetes mellitus with hyperglycemia, with long-term current use of insulin (H) 10/24/2016     [facility]: Medium     Dx in 11/16 with A1C of 7.0.  Increased to 9's in 1/17 (started on metformin), then to 14's in 4/17 during hospital stay for acute on chronic respiratory failure (started on insulin then- lantus and novolog with meals).    Lipitor 40mg/d also started in 4/17 in hospital (with LDL of 56).  Pt also working on diet, lost ~30 lbs since dx of DM.     6/22/17- LDL back at 13- will discuss backing down on lipitor to 10mg/day at next visit.**       (HFpEF) heart failure with preserved ejection fraction (H) 08/27/2016     [facility]: Medium     7/16 Cardiology notes- She was admitted again 7/2-7/4/16  with hypoxemia, which was felt to be related to HFpEF. She was noted in this setting to be in asymptomatic A.fib, CL=301r-156p. Toprol XL was increased to 50 mg daily. As she had shown no evidence of recurrent GI bleeding, warfarin was  resumed on discharge. She was scheduled for EP follow up to address A.fib. Of note, she was also diagnosed with ANGELICA and started on CPAP at this time which she has been using regularly.  Assessment- Sister Sita continues to experience NYHA class II-III exertional dyspnea and appears modestly hypervolemic on exam today. We have instructed her to increase her furosemide to 40 mg qAM/20mg qPM for the next 2 days, and arranged for her to establish care with [facility] within 1 week with BMP at that time.       Acute and chronic respiratory failure with hypoxia (H) 07/02/2016     [facility]: Medium     Lower GI bleed 06/26/2016     [facility]: Medium     Hospitalized 6/26-28/16 with [facility].  Colonoscopy with Dr. Siddiqi intended for after hospital stay, but pt had multiple complications including readmission on 7/2/16 for respiratory failure due to fluid overload and uncontrolled a.fib.  Hgb dropped from 13's to 9's, then to low of 8.5 during second hospitalization.  Colonoscopy f/u not done due to medical complications.  Hgb's slowly improving though not back to baseline (10's).  12/16 consult with Dr. Siddiqi- wary of pt's ability to safely tolerate a colonoscopy or colon surgery.  Rec considering cologuard test to stratify her risk- will discuss with pt at upcoming visit/s.  Discussed- pt is not interested in any testing- would not act on the results if cancer.       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  Followed by [facility] and [facility]ology.  Worsening PFT's in 10/15 despite increase in prednisone from 5mg/d to 10mg/d.    ILD conference- thought Follicular bronchiolitis -started on flovent, azithromycin and montelukast in 4/16.  4/17- off home O2 (had been on for activity) since 4/17 hospitalization txt of pneumonia/influenza/acute respiratory failure.       ANGELICA (obstructive sleep apnea) 04/12/2016     [facility]: Medium  "    Sleep study in '16, mild sleep apnea, but significant sleep hypoventilation.  Started on CPAP, not helpful, so put on bilevel PAP through Dr. Chandler.       Sjogren's syndrome with lung involvement (H) 10/30/2015     [facility]: Medium     Dx in '15 by rheum- likely primary Sjogrens syndrome- 'with borderline positive lip bx, low titer RG, low titer SSA ab, sicca symptoms, interstitial lung disease w/ reticular opacities in lungs- paratracheal lymphadenopathy, elevated CRP'.    Rheum rec txt per pulmonary, rec f/u with rheum in 4/16.   Sx's likely since '12 with migrating joint pains, seen by rheum in '12 (Dr. Ray), dx with inflammatory jt disease, on 0-5mg of prednisone since then with minimal f/u.  Worsening SOB since early '15, lung lesions, PFT's with 50% decreased function- referred to pulmonary.  Has been on more stable prednisone 5mg/d for few months prior to 10/15 f/u PFT's stable, which remained stable.  Will cont f/u through pulmonary and rheum.           Enlarged lymph node 08/04/2015     [facility]: Medium     Gastroesophageal reflux disease without esophagitis 07/23/2015     [facility]: Medium     PPI started in 11/11 (when hospitalized for colonic abscess).  Increased to BID in 1/8/16- saw pulmonologist, noted at visit: \"GERD: continues to have heart burn, advised patient to increase PPI from once per day to BID\"       Neck mass 04/28/2015     [facility]: Medium     4/7/15 CT scan- prominent lymph nodes- rec f/u clinically, or f/u CT scan in 3-6 months.  Also noted apical lung changes- air trapping vs interstitial pulmonary edema (referred to pulmonary- dx with Sjogren's after seen by rheumatology).  6/15 CT scan- stable neck node, indeterminant- no f/u rec.  Possible related to the Sjogren's.    8/15- FNA of neck mass by ENT, Dr. Polk, no signs of malignancy/lymphoma.  F/u with 9/15 with Dr. Polk - focus on lip/cheek bx's.         Rib pain 10/31/2014     [facility]: Medium     S/p severe fall down " stairs in 8/14- hospitalized for a few days, then in rehab for a couple weeks.  Significant swelling, no fx's.  INR dropped for a bit between hosp/rehab, and pt developed DVT in 9/14 in R leg.       Fatty liver disease, nonalcoholic 09/24/2014     [facility]: Medium     DVT (deep venous thrombosis) (H) 09/15/2014     [facility]: Medium     Dx on 9/11/14 via u/s- worsening sx's over prior few days.  10/14 hematology consult- felt that the DVT was not a coumadin failure- attributed to immobility s/p fall, lower INR during hospitalization/rehab stay.    Rec continued long-term coumadin therapy (due to a.fib), f/u LE u/s in 3-6- months (nl f/u u/s in 4/15), and compression stockings x 2 yrs (pt compliant).       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       Allergic reaction caused by a drug - likely plaquenil 04/30/2013     [facility]: Medium     Pt seen at [facility] 5/13/13- plan to do prednisone 7.5mg daily until able to wean down (Dr. Kevin Marie).  Blood tests q3 months.       Joint pains 01/08/2013     [facility]: Medium     Inflam jt issue- shoulder, hp, generalized jt pains resolved with prednisone- dx with Sjogrens - initial dx pallendromic rheumatism vs pseudogout per rheum- stabilized on prednisone.       Insomnia 05/26/2012     [facility]: Medium     Left ventricular hypertrophy 11/04/2011     [facility]: Medium     LVH on stress echo- cardiac w/u at [facility] ER- neg CT scan for PE, neg stress echo in 8/06        Sciatica 11/03/2010     [facility]: Medium     [facility] - was on neurontin and zanaflex at night- helping her sleep.  Last MRI in ~6/09.  Improved after 11/11 surgery- stopped meds.       Essential hypertension with goal blood pressure less than 140/90 10/19/2010     [facility]: Medium     Lisinopril, lasix, spironolactone, toprol XL.  Diltiazem stopped in 12/15 due to dizziness (discussed with Dr. Hurtado- switched to toprol XL).       Major depressive  disorder, recurrent episode, moderate (H)      [facility]: Medium     Lexapro 10mg/d.  10/17- went from paxil 10mg/d to lexapro 10mg/d.  Pt had been on paxil 10mg/d for years- increased to 20mg/d in 8/14.    Trial off in 10/15 (concerned with wt gain).  Also tried off in 5/09, restarted 7/09.    Weaned off clonazepam.           Restless legs syndrome (RLS) 09/06/2007     [facility]: Medium     Health Care Home 02/09/2012     [facility]: Low              Advanced directives, counseling/discussion 11/01/2011     [facility]: Low     Received outside advance directive.  HCD:Previously signed by patient and notarized by [employer].  scanned into EMR as Advance Directive/Living Will document. View document and details in Code Status History Report. Please see advance directive for specifics.   Health care directive (FIVE WISHES) reviewed and documented.  5/10/12 MALIKA Aguilar LPN        Received outside DNR form.    scanned and placed behind media tab.  Please see DNR form for specifics. Routed to certified facilitator for review and further documentation.  DNR form reviewed and documented.  11/1/11 MALIKA Aguilar LPN  In media tab under date 10/14/11.  Lev Tristan MD           Disorder of bone and cartilage 09/24/2004     [facility]: Low     Osteopenia in '10, recheck '15.  Risk factors of PPI use and prednisone use since '12 (0-5mg/d).  Should check Vit D levels as well.       Osteoarthritis 09/24/2004     [facility]: Low     Problem list name updated by automated process. Provider to review       Alcohol abuse, in remission 09/24/2004     [facility]: Low     Temporomandibular joint disorder 11/24/2003     [facility]: Low     Problem list name updated by automated process. Provider to review          Past Medical/Surgical History:  Past Medical History:   Diagnosis Date     Alcohol abuse, in remission      Allergic rhinitis, cause unspecified     allegra helps when she takes it     Antiplatelet or  antithrombotic long-term use      Atrial fibrillation (H)     in hosp in 11/11 after surgery w/ fluid overload     Cardiomegaly     LVH on stress echo- cardiac w/u at [facility] ER- neg CT scan for PE, neg stress echo in 8/06     Chest pain, unspecified      Disorder of bone and cartilage, unspecified     osteopenia (had been on prempro), improved on 6/06 dexa, stable dexa 11/10     Diverticulosis of colon (without mention of hemorrhage)     last episode yrs ago     Essential hypertension, benign      Gastro-oesophageal reflux disease      Insomnia, unspecified     weaned off clonazepam     Irregular heart beat      Lumbago 7/09    MRI with DJD, now seeing Dr. Cain for sciatic sx's     Major depressive disorder, recurrent episode, moderate (H)      Obstructive sleep apnea      Osteoarthrosis, unspecified whether generalized or localized, unspecified site      Sjogren's syndrome (H)      Sleep apnea      Tobacco use disorder     chantix in 9/07, started again in 6/08, working     Past Surgical History:   Procedure Laterality Date     BACK SURGERY  1962     BIOPSY BREAST  9/27/02    Biopsy Left Breast     C APPENDECTOMY  1970's?     C NONSPECIFIC PROCEDURE  11/05    exploratory abd lap, adhesions, resolved RLQ pain, diverticulitis episodes     CARDIAC SURGERY       CHOLECYSTECTOMY  1990's?     COLECTOMY LEFT  11/7/2011    Procedure:COLECTOMY LEFT; Laparoscopic mobilization of splenic flexture, sigmoid colectomy, coloprotoscopy, loop illeostomy; Surgeon:CK CASTANEDA; Location:[facility]     HYSTERECTOMY TOTAL ABDOMINAL, BILATERAL SALPINGO-OOPHORECTOMY, COMBINED  11/7/2011    Procedure:COMBINED HYSTERECTOMY TOTAL ABDOMINAL, BILATERAL SALPINGO-OOPHORECTOMY; total abdominal hysterectomy, bilateral salpingo-oophorectomy; Surgeon:ALETA MANUEL; Location:[facility] OR     INSERT STENT URETER  11/7/2011    Procedure:INSERT STENT URETER; Placement of Bilateral Ureteral Stents ; Surgeon:PRANEETH BRYANT; Location:[facility] OR     SIGMOIDOSCOPY  "FLEXIBLE  11/3/2011    Procedure:SIGMOIDOSCOPY FLEXIBLE; Flexible Sigmoidoscopy; Surgeon:CK CASTANEDA; Location:[facility]     TAKEDOWN ILEOSTOMY  2/1/2012    Procedure:TAKEDOWN ILEOSTOMY; Takedown Loop Ileostomy ; Surgeon:CK CASTANEDA; Location:[facility] OR           Physical Examination:  Vitals: /72  Pulse 79  Resp 18  Ht 1.702 m (5' 7\")  Wt 95.7 kg (211 lb)  SpO2 94%  BMI 33.05 kg/m2  BMI= Body mass index is 33.05 kg/(m^2).         [place] Total Score 5/1/2018   Total score - [place] 5       GENERAL APPEARANCE: alert and mild distress    10/17/17 cardiac echo: Interpretation Summary  Technically difficult study. Poor acoustic windows.  Global and regional left ventricular function is normal with an EF of 60-65%.  Mild right ventricular dilation is present. Global right ventricular function  is normal.  Pulmonary artery systolic pressure is normal.  The inferior vena cava is normal. Estimated mean right atrial pressure is 3  mmHg.  No pericardial effusion is present.    6/17 PFTs: Narrative   The FEV1 and FVC are reduced but the FEV1/FVC ratio is normal.    The diffusing capacity is normal.     IMPRESSION:  Reduced FEV1 and FVC with normal FEV1/FVC ratio and historically normal TLC last measured on 03/2017, suggestive of a non-specific pattern that can be related to obesity, or at times can be seen with early reactive airway disease.  Normal diffusion capacity.         Assessment/Plan: last seen 4/25/16 with problems with leak/fit, but showing good response to bipap.  Reports recently that she fell over and hit head, now in afib all the time.  Problems with getting good fit on mask (mask approx 4 months old-has to hold her hand on mask to sleep with it) and lasix routine is keeping her from sleeping through the night.  Is dyspneic when she initially puts on mask-could be effort related with getting ready for bed.  1. ashwin with hypoventilation syndrome: apria for mask fit, then 2 wks of use, then need " download.  Will connect with phone visit to see what we can change.  Will need overnight ox once there is a fit that is better than requiring hand all night on interface.  2. Sleep disruption: CHF: HFpEF-will add Mikaela Deras and Dr. Rosa to note-can a timing adjustment be made with diuretic for Ms. Buckner to improve sleep quality and continuity?  Please page  3. Obesity: unchanged.    LIZY Anderson, CNP-BC  Sleep Medicine  [phone] (pager)  Time spent with patient is 25 minutes, of which >50% was spent in counseling, education and coordination of care.                Sincerely,        LIZY Donaldson CNP

## 2018-05-07 ENCOUNTER — MYC MEDICAL ADVICE (OUTPATIENT)
Dept: FAMILY MEDICINE | Facility: CLINIC | Age: 78
End: 2018-05-07

## 2018-05-08 NOTE — TELEPHONE ENCOUNTER
Left message for patient to callback.  Told her would try to callback later this evening as well if don't hear from her  Sent [employer] message to pt also  Lydia HALEY RN

## 2018-05-08 NOTE — TELEPHONE ENCOUNTER
CW,   Patient states she is having one tooth pulled Thursday 5/10/2018  They want her INR to be below 2  Last INR 2.7 on 4/18/2018    Patient will have to come in tomorrow - Wed for INR  May not be enough time to get INR below two since Warfarin peaks in 3-4 days - may not see results by tomorrow  Do you want pt to hold Warfarin today?   Takes 56.25mg/wk - this would be 13.3% decrease in dose  Per INR protocol this should decrease pt's INR by about 1 point  Do you want pt on Lovenox if INR will be below 2.0?  Will resume Coumadin Thursday after dental procedure.   Please advise.  Lydia HALEY RN

## 2018-05-09 DIAGNOSIS — I48.91 ATRIAL FIBRILLATION WITH CONTROLLED VENTRICULAR RESPONSE (H): ICD-10-CM

## 2018-05-09 DIAGNOSIS — F33.1 MAJOR DEPRESSIVE DISORDER, RECURRENT EPISODE, MODERATE (H): ICD-10-CM

## 2018-05-09 RX ORDER — METOPROLOL SUCCINATE 100 MG/1
TABLET, EXTENDED RELEASE ORAL
Qty: 90 TABLET | Refills: 2 | OUTPATIENT
Start: 2018-05-09

## 2018-05-09 RX ORDER — ESCITALOPRAM OXALATE 10 MG/1
TABLET ORAL
Qty: 90 TABLET | Refills: 0 | Status: SHIPPED | OUTPATIENT
Start: 2018-05-09 | End: 2018-07-06

## 2018-05-09 NOTE — TELEPHONE ENCOUNTER
Prescription approved per [facility] Refill Protocol.  Lydia HALEY RN    Next 5 appointments (look out 90 days)     May 23, 2018 10:00 AM CDT   Office Visit with Ilene Tristan MD   [facility] ([facility])    [address] [address]   [phone]

## 2018-05-09 NOTE — TELEPHONE ENCOUNTER
"Requested Prescriptions   Pending Prescriptions Disp Refills     escitalopram (LEXAPRO) 10 MG tablet [Pharmacy Med Name: ESCITALOPRAM 10 MG TABLET] 90 tablet 0     Sig: TAKE 1 TABLET (10 MG) BY MOUTH DAILY    SSRIs Protocol Failed    5/9/2018 10:36 AM       Failed - PHQ-9 score less than 5 in past 6 months    Please review last PHQ-9 score.          Passed - Patient is age 18 or older       Passed - No active pregnancy on record       Passed - No positive pregnancy test in last 12 months       Passed - Recent (6 mo) or future (30 days) visit within the authorizing provider's specialty    Patient had office visit in the last 6 months or has a visit in the next 30 days with authorizing provider or within the authorizing provider's specialty.  See \"Patient Info\" tab in inbasket, or \"Choose Columns\" in Meds & Orders section of the refill encounter.            "

## 2018-05-10 ENCOUNTER — TELEPHONE (OUTPATIENT)
Dept: FAMILY MEDICINE | Facility: CLINIC | Age: 78
End: 2018-05-10

## 2018-05-10 ENCOUNTER — APPOINTMENT (OUTPATIENT)
Dept: LAB | Facility: CLINIC | Age: 78
End: 2018-05-10
Payer: MEDICARE

## 2018-05-10 DIAGNOSIS — I48.21 PERMANENT ATRIAL FIBRILLATION (H): ICD-10-CM

## 2018-05-10 DIAGNOSIS — Z79.01 LONG TERM CURRENT USE OF ANTICOAGULANT THERAPY: ICD-10-CM

## 2018-05-10 LAB — INR POINT OF CARE: 1.2 (ref 0.86–1.14)

## 2018-05-10 PROCEDURE — 85610 PROTHROMBIN TIME: CPT | Mod: QW | Performed by: FAMILY MEDICINE

## 2018-05-10 PROCEDURE — 99207 ZZC NO CHARGE NURSE ONLY: CPT | Performed by: FAMILY MEDICINE

## 2018-05-10 PROCEDURE — 36416 COLLJ CAPILLARY BLOOD SPEC: CPT | Performed by: FAMILY MEDICINE

## 2018-05-10 NOTE — TELEPHONE ENCOUNTER
CW,  FYI:   Just wanted to let you know pt's INR was below 2.0 as she needed it to be for tooth pulling.   Was very low - 1.2  Bumped up her dose for today   Huddled with JS just to make sure pt didn't need anything further since INR so low (Lovenox) - ok to just get pt back on Warfarin he said  Lydia HALEY RN

## 2018-05-10 NOTE — TELEPHONE ENCOUNTER
ANTICOAGULATION FOLLOW-UP CLINIC VISIT    Patient Name:  Betty Tee  Date:  5/10/2018  Contact Type:  Face to Face    SUBJECTIVE:  Bleeding Signs/Symptoms: None  Thromboembolic Signs/Symptoms: None    Medication Changes:  No  Dietary Changes:  No  Bacterial/Viral Infection:  No    Missed Coumadin Doses:  None  Other Concerns:  Yes: Patient having tooth removed today      ASSESSMENT/PLAN:  See: ANTICOAGULATION QIC flow sheet.    INR 1.2 today  INR needed to be below 2.0 so patient could have tooth pulled  States she's only been on 7.5mg daily - not taking 11.25mg as advised with last dosing  Advised 11.25mg tonight to bump patient's INR up  Then 7.5mg daily  Advised recheck in 7 days - next Thursday  Will FYI PCP too since INR so low   Patient needed a letter stating her INR was below 2.0 wrote up and signed letter for pt  Lydia HALEY RN    Dosage adjustment made based on physician directed care plan.    JIMI VOGT

## 2018-05-22 DIAGNOSIS — E11.9 TYPE 2 DIABETES MELLITUS WITHOUT COMPLICATION, WITHOUT LONG-TERM CURRENT USE OF INSULIN (H): ICD-10-CM

## 2018-05-22 DIAGNOSIS — E11.69 TYPE 2 DIABETES MELLITUS WITH OTHER SPECIFIED COMPLICATION (H): ICD-10-CM

## 2018-05-22 RX ORDER — PEN NEEDLE, DIABETIC 32GX 5/32"
NEEDLE, DISPOSABLE MISCELLANEOUS
Qty: 400 EACH | Refills: 0 | Status: SHIPPED | OUTPATIENT
Start: 2018-05-22 | End: 2018-08-26

## 2018-05-22 RX ORDER — INSULIN ASPART 100 [IU]/ML
INJECTION, SOLUTION INTRAVENOUS; SUBCUTANEOUS
Qty: 15 ML | Refills: 0 | Status: SHIPPED | OUTPATIENT
Start: 2018-05-22 | End: 2018-07-06

## 2018-05-22 RX ORDER — BLOOD SUGAR DIAGNOSTIC
STRIP MISCELLANEOUS
Qty: 400 STRIP | Refills: 0 | Status: SHIPPED | OUTPATIENT
Start: 2018-05-22 | End: 2018-10-05

## 2018-05-22 NOTE — TELEPHONE ENCOUNTER
"Prescription approved per [facility] Refill Protocol.  Lydia HALEY RN    Requested Prescriptions   Pending Prescriptions Disp Refills     BD JANE U/F 32G X 4 MM insulin pen needle [Pharmacy Med Name: BD ULTRA-FINE PEN NDL [id number]]  7     Sig: USE 4 DAILY AS DIRECTED.    Diabetic Supplies Protocol Passed    5/22/2018  9:57 AM       Passed - Patient is 18 years of age or older       Passed - Recent (6 mo) or future (30 days) visit within the authorizing provider's specialty    Patient had office visit in the last 6 months or has a visit in the next 30 days with authorizing provider.  See \"Patient Info\" tab in inbasket, or \"Choose Columns\" in Meds & Orders section of the refill encounter.            Next 5 appointments (look out 90 days)     May 23, 2018 10:00 AM CDT   Office Visit with Ilene Tristan MD   [facility] ([facility])    [address] [address] [phone]                  "

## 2018-05-22 NOTE — TELEPHONE ENCOUNTER
"Prescription approved per [facility] Refill Protocol.  Lydia HALEY RN    Requested Prescriptions   Pending Prescriptions Disp Refills     ACCU-CHEK SHANNON PLUS test strip [Pharmacy Med Name: ACCU-CHEK SHANNON PLUS TEST STRP] 300 strip 3     Sig: USE TO TEST BLOOD SUGARS 4 TIMES DAILY OR AS DIRECTED    Diabetic Supplies Protocol Passed    5/22/2018  8:31 AM       Passed - Patient is 18 years of age or older       Passed - Recent (6 mo) or future (30 days) visit within the authorizing provider's specialty    Patient had office visit in the last 6 months or has a visit in the next 30 days with authorizing provider.  See \"Patient Info\" tab in inbasket, or \"Choose Columns\" in Meds & Orders section of the refill encounter.            NOVOLOG FLEXPEN 100 UNIT/ML soln [Pharmacy Med Name: NOVOLOG 100 UNITS/ML FLEXPEN]  0     Sig: INJECT 12 UNITS SUBCUTANEOUS 3 TIMES DAILY (WITH MEALS)    Short Acting Insulin Protocol Passed    5/22/2018  8:31 AM       Passed - Blood pressure less than 140/90 in past 6 months    BP Readings from Last 3 Encounters:   05/01/18 101/72   04/26/18 110/75   04/11/18 118/77                Passed - LDL on file in past 12 months    Recent Labs   Lab Test  02/21/18   1230   LDL  5            Passed - Microalbumin on file in past 12 months    Recent Labs   Lab Test  10/06/17   1422   MICROL  14   UMALCR  29.65*            Passed - Serum creatinine on file in past 12 months    Recent Labs   Lab Test  04/18/18   1438   CR  0.90            Passed - HgbA1C in past 3 or 6 months    If HgbA1C is 8 or greater, it needs to be on file within the past 3 months.  If less than 8, must be on file within the past 6 months.     Recent Labs   Lab Test  01/31/18   1428   A1C  7.0*            Passed - Patient is age 18 or older       Passed - Recent (6 mo) or future (30 days) visit within the authorizing provider's specialty    Patient had office visit in the last 6 months or has a visit in the next 30 days with authorizing " "provider or within the authorizing provider's specialty.  See \"Patient Info\" tab in inbasket, or \"Choose Columns\" in Meds & Orders section of the refill encounter.            Next 5 appointments (look out 90 days)     May 23, 2018 10:00 AM CDT   Office Visit with Ilene Tristan MD   [facility] ([facility])    [address] [id number] [phone]                  "

## 2018-05-23 ENCOUNTER — TELEPHONE (OUTPATIENT)
Dept: FAMILY MEDICINE | Facility: CLINIC | Age: 78
End: 2018-05-23

## 2018-05-23 ENCOUNTER — APPOINTMENT (OUTPATIENT)
Dept: LAB | Facility: CLINIC | Age: 78
End: 2018-05-23
Payer: MEDICARE

## 2018-05-23 ENCOUNTER — OFFICE VISIT (OUTPATIENT)
Dept: FAMILY MEDICINE | Facility: CLINIC | Age: 78
End: 2018-05-23
Payer: MEDICARE

## 2018-05-23 VITALS
TEMPERATURE: 98.6 F | RESPIRATION RATE: 16 BRPM | DIASTOLIC BLOOD PRESSURE: 76 MMHG | SYSTOLIC BLOOD PRESSURE: 114 MMHG | OXYGEN SATURATION: 96 % | HEART RATE: 82 BPM

## 2018-05-23 DIAGNOSIS — F33.1 MAJOR DEPRESSIVE DISORDER, RECURRENT EPISODE, MODERATE (H): ICD-10-CM

## 2018-05-23 DIAGNOSIS — I48.91 ATRIAL FIBRILLATION WITH CONTROLLED VENTRICULAR RESPONSE (H): ICD-10-CM

## 2018-05-23 DIAGNOSIS — R47.89 WORD FINDING DIFFICULTY: ICD-10-CM

## 2018-05-23 DIAGNOSIS — E78.5 HYPERLIPIDEMIA LDL GOAL <100: ICD-10-CM

## 2018-05-23 DIAGNOSIS — M35.02 SJOGREN'S SYNDROME WITH LUNG INVOLVEMENT (H): ICD-10-CM

## 2018-05-23 DIAGNOSIS — B00.1 HERPES LABIALIS: ICD-10-CM

## 2018-05-23 DIAGNOSIS — Z79.01 LONG TERM CURRENT USE OF ANTICOAGULANT THERAPY: ICD-10-CM

## 2018-05-23 DIAGNOSIS — Z79.4 TYPE 2 DIABETES MELLITUS WITH HYPERGLYCEMIA, WITH LONG-TERM CURRENT USE OF INSULIN (H): Primary | ICD-10-CM

## 2018-05-23 DIAGNOSIS — I48.21 PERMANENT ATRIAL FIBRILLATION (H): ICD-10-CM

## 2018-05-23 DIAGNOSIS — E11.65 TYPE 2 DIABETES MELLITUS WITH HYPERGLYCEMIA, WITH LONG-TERM CURRENT USE OF INSULIN (H): Primary | ICD-10-CM

## 2018-05-23 DIAGNOSIS — I10 ESSENTIAL HYPERTENSION WITH GOAL BLOOD PRESSURE LESS THAN 140/90: ICD-10-CM

## 2018-05-23 LAB
ALBUMIN SERPL-MCNC: 3.3 G/DL (ref 3.4–5)
ALP SERPL-CCNC: 71 U/L (ref 40–150)
ALT SERPL W P-5'-P-CCNC: 21 U/L (ref 0–50)
ANION GAP SERPL CALCULATED.3IONS-SCNC: 10 MMOL/L (ref 3–14)
AST SERPL W P-5'-P-CCNC: 15 U/L (ref 0–45)
BILIRUB SERPL-MCNC: 0.7 MG/DL (ref 0.2–1.3)
BUN SERPL-MCNC: 19 MG/DL (ref 7–30)
CALCIUM SERPL-MCNC: 9 MG/DL (ref 8.5–10.1)
CHLORIDE SERPL-SCNC: 103 MMOL/L (ref 94–109)
CHOLEST SERPL-MCNC: 97 MG/DL
CO2 SERPL-SCNC: 24 MMOL/L (ref 20–32)
CREAT SERPL-MCNC: 0.9 MG/DL (ref 0.52–1.04)
GFR SERPL CREATININE-BSD FRML MDRD: 61 ML/MIN/1.7M2
GLUCOSE SERPL-MCNC: 131 MG/DL (ref 70–99)
HBA1C MFR BLD: 6.9 % (ref 0–5.6)
HDLC SERPL-MCNC: 53 MG/DL
INR POINT OF CARE: 2 (ref 0.86–1.14)
LDLC SERPL CALC-MCNC: 21 MG/DL
NONHDLC SERPL-MCNC: 44 MG/DL
POTASSIUM SERPL-SCNC: 3.5 MMOL/L (ref 3.4–5.3)
PROT SERPL-MCNC: 7.7 G/DL (ref 6.8–8.8)
SODIUM SERPL-SCNC: 137 MMOL/L (ref 133–144)
TRIGL SERPL-MCNC: 113 MG/DL
TSH SERPL DL<=0.005 MIU/L-ACNC: 2.42 MU/L (ref 0.4–4)

## 2018-05-23 PROCEDURE — 36416 COLLJ CAPILLARY BLOOD SPEC: CPT | Performed by: FAMILY MEDICINE

## 2018-05-23 PROCEDURE — 80061 LIPID PANEL: CPT | Performed by: FAMILY MEDICINE

## 2018-05-23 PROCEDURE — 80053 COMPREHEN METABOLIC PANEL: CPT | Performed by: FAMILY MEDICINE

## 2018-05-23 PROCEDURE — 99215 OFFICE O/P EST HI 40 MIN: CPT | Performed by: FAMILY MEDICINE

## 2018-05-23 PROCEDURE — 85610 PROTHROMBIN TIME: CPT | Mod: QW | Performed by: FAMILY MEDICINE

## 2018-05-23 PROCEDURE — 83036 HEMOGLOBIN GLYCOSYLATED A1C: CPT | Performed by: FAMILY MEDICINE

## 2018-05-23 PROCEDURE — 84443 ASSAY THYROID STIM HORMONE: CPT | Performed by: FAMILY MEDICINE

## 2018-05-23 PROCEDURE — 99207 ZZC NO CHARGE NURSE ONLY: CPT | Performed by: FAMILY MEDICINE

## 2018-05-23 RX ORDER — ACYCLOVIR 400 MG/1
400 TABLET ORAL 3 TIMES DAILY
Qty: 15 TABLET | Refills: 2 | Status: SHIPPED | OUTPATIENT
Start: 2018-05-23 | End: 2018-11-28

## 2018-05-23 RX ORDER — WARFARIN SODIUM 7.5 MG/1
TABLET ORAL
Qty: 90 TABLET | Refills: 3 | Status: SHIPPED | OUTPATIENT
Start: 2018-05-23 | End: 2019-02-14

## 2018-05-23 NOTE — TELEPHONE ENCOUNTER
Wasn't sure if oxygen rx was from cardiology/CORE, or pulmonary.  Do think her pulmonary office should be much better equipped to address her concerns, and make sure she has a portable option.  Will send back to triage to discuss with pulmonary office- would prefer the orders come from their office.  Thanks!  CW

## 2018-05-23 NOTE — NURSING NOTE
"Chief Complaint   Patient presents with     Follow Up For     fasting labs. Pt stopped lab for fasting lab.      Initial /76  Pulse 82  Temp 98.6  F (37  C) (Tympanic)  Resp 16  SpO2 96% Estimated body mass index is 33.05 kg/(m^2) as calculated from the following:    Height as of 5/1/18: 5' 7\" (1.702 m).    Weight as of 5/1/18: 211 lb (95.7 kg).  BP completed using cuff size: large. R arm       Health Maintenance that is potentially due pending provider review:   NONE    n/a       Mary Warren CMA     "

## 2018-05-23 NOTE — TELEPHONE ENCOUNTER
Called over to pulmonary office - Dr Wilder   states she will talk to patient about this and get new Rx taken care of   She will also need to check if any testing needs to be updated before new Rx can be done - pt was last seen Jan 2018 and has upcoming appt July 2018  Lydia HALEY RN

## 2018-05-23 NOTE — TELEPHONE ENCOUNTER
ANTICOAGULATION FOLLOW-UP CLINIC VISIT    Patient Name:  Betty Tee  Date:  5/23/2018  Contact Type:  Face to Face    SUBJECTIVE:  Bleeding Signs/Symptoms: None  Thromboembolic Signs/Symptoms: None    Medication Changes:  No  Dietary Changes:  No  Bacterial/Viral Infection:  No    Missed Coumadin Doses:  None  Other Concerns:  No      ASSESSMENT/PLAN:  See: ANTICOAGULATION QIC flow sheet.    INR 2.0   Continue 7.5mg daily  Recheck INR 2 weeks  Pt here for appt with PCP today - informed pt of directions while she was in office.  Lydia HALEY RN    Dosage adjustment made based on physician directed care plan.    JIMI VOGT

## 2018-05-23 NOTE — PROGRESS NOTES
SUBJECTIVE:   Betty Tee is a 78 year old female who presents to clinic today for the following health issues:  Pt is here today for fasting labs. Pt states that her inflammatory joint has bothering pt. Burning in the pelvic area. Pt blood sugar has been over 500. Pt stated that she took insulin and within a half hour and the blood sugar came down. Pt is also stated that she has trouble collecting words when trying to speak.    DM-  Day she had glucose of 500, sweating a lot.  Took insulin, ate, and took extra insulin.  Trying with her diet, getting out and gardening.    Word finding-  Tired Sun at graduation- went to say something and couldn't find the words she wanted.  No garbling.  Frustrating.  With lots of intelligent people.  Had three graduation parties that weekend- worst at the third.  No vision issues, no speech, facial weakness sx's.  Had been trying to use her oxygen less, and there was a fair amount of walking with the graduation processions- at least 3 blocks without stopping.  Will be better about using it.    Groin area- flaming, red area.  Used old medication.  And acyclovir.  Likely yeast infection, used likely ketoconazole from previous rx (2/17).  On it's way out now.  Will cont to use.    SOB- pt notes that 'cardiology thinks it's a pulmonary issues, pulmonary thinks its cardiac related'.  Using oxygen - some of the time at home, not at night.    Needed it after pulling weeds.  Tends to bring it with her, but leaves it in the car as it's heavy and cumbersome.  Recently went to dentist to have tooth pulled.  89% oxygen when she walked into the office.      Card- Reviewed recent note- they had rec f/u in 8 months, CORE in 4 wks.    Pulm- Reviewed recent notes- 10mg prednisone.  F/u q6mo.    Rheum- Reviewed recent notes- considering rituximab.    ANGELICA-  Using BiPAP at night.  Pulls it off part way through the night.  Sometimes puts it back on.        Problem list and histories reviewed &  adjusted, as indicated.  Additional history: as documented    Patient Active Problem List   Diagnosis     Temporomandibular joint disorder     Diverticulitis of colon     Disorder of bone and cartilage     Osteoarthritis     Alcohol abuse, in remission     Restless legs syndrome (RLS)     Major depressive disorder, recurrent episode, moderate (H)     Essential hypertension with goal blood pressure less than 140/90     Sciatica     Advanced directives, counseling/discussion     Colouterine fistula     Permanent atrial fibrillation (H)     Left ventricular hypertrophy     Health Care Home     Insomnia     Joint pains     Allergic reaction caused by a drug - likely plaquenil     Breast fibroadenoma     DVT (deep venous thrombosis) (H)     Fatty liver disease, nonalcoholic     Rib pain     Neck mass     Gastroesophageal reflux disease without esophagitis     Enlarged lymph node     Sjogren's syndrome with lung involvement (H)     ANGELICA (obstructive sleep apnea)     ILD (interstitial lung disease) (H)     Lower GI bleed     Acute and chronic respiratory failure with hypoxia (H)     (HFpEF) heart failure with preserved ejection fraction (H)     Type 2 diabetes mellitus with hyperglycemia, with long-term current use of insulin (H)     Heart failure, chronic, with acute decompensation (H)     Hyperlipidemia LDL goal <100     Long term current use of anticoagulant therapy     Inflammatory arthritis     Past Surgical History:   Procedure Laterality Date     BACK SURGERY  1962     BIOPSY BREAST  9/27/02    Biopsy Left Breast     C APPENDECTOMY  1970's?     C NONSPECIFIC PROCEDURE  11/05    exploratory abd lap, adhesions, resolved RLQ pain, diverticulitis episodes     CARDIAC SURGERY       CHOLECYSTECTOMY  1990's?     COLECTOMY LEFT  11/7/2011    Procedure:COLECTOMY LEFT; Laparoscopic mobilization of splenic flexture, sigmoid colectomy, coloprotoscopy, loop illeostomy; Surgeon:CK CASTANEDA; Location:[facility]     HYSTERECTOMY  TOTAL ABDOMINAL, BILATERAL SALPINGO-OOPHORECTOMY, COMBINED  11/7/2011    Procedure:COMBINED HYSTERECTOMY TOTAL ABDOMINAL, BILATERAL SALPINGO-OOPHORECTOMY; total abdominal hysterectomy, bilateral salpingo-oophorectomy; Surgeon:ALETA MANUEL; Location:[facility]     INSERT STENT URETER  11/7/2011    Procedure:INSERT STENT URETER; Placement of Bilateral Ureteral Stents ; Surgeon:PRANEETH BRYANT; Location:[facility]     SIGMOIDOSCOPY FLEXIBLE  11/3/2011    Procedure:SIGMOIDOSCOPY FLEXIBLE; Flexible Sigmoidoscopy; Surgeon:CK CASTANEDA; Location:[facility]     TAKEDOWN ILEOSTOMY  2/1/2012    Procedure:TAKEDOWN ILEOSTOMY; Takedown Loop Ileostomy ; Surgeon:CK CASTANEDA; Location:[facility]       Social History   Substance Use Topics     Smoking status: Former Smoker     Packs/day: 0.50     Years: 10.00     Types: Cigarettes     Quit date: 8/1/2011     Smokeless tobacco: Never Used      Comment: 1/2 ppd     Alcohol use No      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     Family History   Problem Relation Age of Onset     C.A.D. Mother 63     MI- first at age 63     HEART DISEASE Mother      Hypertension Mother      CEREBROVASCULAR DISEASE Mother      Hyperlipidemia Mother      Alcohol/Drug Father      Alzheimer Disease Father      Dementia Father      Hypertension Father      Hyperlipidemia Father      C.A.D. Sister 52     Minor MI- age 50's     HEART DISEASE Sister      Hypertension Sister      Hypertension Sister      Hypertension Brother      Cancer - colorectal Sister 48     Late 40's early 50's     Prostate Cancer Brother 74     Dx'd age 74     GASTROINTESTINAL DISEASE Sister      Diverticulitis     GASTROINTESTINAL DISEASE Brother      Diverticulitis     Lipids Sister      Lipids Sister      Parkinsonism Brother      DIABETES Sister      HEART DISEASE Sister      CHF     CANCER Sister      lung, smoker     Substance Abuse Sister      Substance Abuse Brother      Asthma Sister      CANCER Sister      Breast Cancer Daughter       Prostate Cancer Brother      Hyperlipidemia Brother          Current Outpatient Prescriptions   Medication Sig Dispense Refill     acetaminophen (TYLENOL) 500 MG tablet Take 500 mg by mouth nightly as needed        acyclovir (ZOVIRAX) 400 MG tablet Take 400 mg by mouth See Admin Instructions 5 times daily as needed for outbreaks       acyclovir (ZOVIRAX) 5 % ointment Apply topically 6 times daily (Patient taking differently: Apply topically 6 times daily As needed for outbreaks) 15 g 3     albuterol (PROAIR HFA, PROVENTIL HFA, VENTOLIN HFA) 108 (90 BASE) MCG/ACT inhaler Inhale 2 puffs into the lungs every 6 hours 1 Inhaler 3     atorvastatin (LIPITOR) 10 MG tablet Take 1 tablet (10 mg) by mouth daily 90 tablet 0     blood glucose monitoring (ACCU-CHEK SHANNON PLUS) test strip Use to test blood sugar 4 times daily or as directed.  Ok to substitute alternative if insurance prefers. 120 strip 11     blood glucose monitoring (ACCU-CHEK FASTCLIX) lancets Use to test blood sugar 4 times daily or as directed.  Ok to substitute alternative if insurance prefers. 1 Box 11     blood glucose monitoring (ACCU-CHEK MULTICLIX) lancets Use to test blood sugar 4 times daily or as directed. 4 Box 3     COMPRESSION STOCKINGS Wear compression stockings in affected leg (right leg) or both legs most of the time during the day and take them off at night. 2 each 2     fluticasone (FLONASE) 50 MCG/ACT nasal spray Spray 1-2 sprays into both nostrils daily (Patient taking differently: Spray 1-2 sprays into both nostrils daily as needed for allergies ) 16 g 5     fluticasone (FLOVENT HFA) 220 MCG/ACT inhaler Inhale 2 puffs into the lungs 2 times daily 3 Inhaler 3     insulin pen needle (B-D U/F) 31G X 8 MM Use 4 times daily (with Lantus and Novolog) or as directed. 400 each 3     ketoconazole (NIZORAL) 2 % cream Apply topically 2 times daily 60 g 1     LANTUS SOLOSTAR 100 UNIT/ML soln INJECT 25 UNTIS SUBCUTANEOUSLY EVERY DAY 15 mL 0      lisinopril (PRINIVIL/ZESTRIL) 2.5 MG tablet TAKE 1 TABLET (2.5 MG) BY MOUTH DAILY 90 tablet 0     metFORMIN (GLUCOPHAGE-XR) 500 MG 24 hr tablet TAKE 2 TABLETS BY MOUTH DAILY WITH DINNER 180 tablet 0     metoprolol succinate (TOPROL XL) 25 MG 24 hr tablet Take 0.5 tablets (12.5 mg) by mouth 2 times daily Take 50 mg by mouth in combination with 12.5 for a total of 62.5 twice a day 30 tablet 3     metoprolol succinate (TOPROL-XL) 100 MG 24 hr tablet Take 50 mg by mouth in combination with 12.5 for a total of 62.5 twice a day 90 tablet 3     montelukast (SINGULAIR) 10 MG tablet TAKE 1 TABLET BY MOUTH AT BEDTIME 90 tablet 1     order for DME Equipment being ordered: Full face mask for CPAP - size: F10 large 1 each 0     order for DME Oxygen: Patient requires supplemental Oxygen 4 LPM via nasal canula with activity. Please provide patient with a home unit and with portability capability. Oxygen will be for a lifetime. 1 Device 0     potassium chloride SA (K-DUR/KLOR-CON M) 20 MEQ CR tablet Increase KCL to 40 MEQ in AM and 20 MEQ in the evening. 180 tablet 3     predniSONE (DELTASONE) 10 MG tablet Take 2 tablets for three days (4/12-14), then take 1 tablet daily (10 mg daily starting 4/15) 90 tablet 3     spironolactone (ALDACTONE) 25 MG tablet Take 2 tablets (50 mg) by mouth daily 180 tablet 3     torsemide (DEMADEX) 20 MG tablet Take 40 mg in the morning, 30 mg in the afternoon. 105 tablet 3     traZODone (DESYREL) 50 MG tablet TAKE 1/2-1 TABLET BY MOUTH NIGHTLY AS NEEDED, CAN TITRATE DOWN TO 1/2TAB OR UP TO 2TABS AS NEEDED 180 tablet 0     ACCU-CHEK SHANNON PLUS test strip USE TO TEST BLOOD SUGARS 4 TIMES DAILY OR AS DIRECTED 400 strip 0     acyclovir (ZOVIRAX) 400 MG tablet Take 1 tablet (400 mg) by mouth 3 times daily For a couple days 15 tablet 2     alendronate (FOSAMAX) 70 MG tablet Take 1 tablet (70 mg) by mouth every 7 days 4 tablet 11     BD JANE U/F 32G X 4 MM insulin pen needle USE 4 DAILY AS DIRECTED. 400 each  0     escitalopram (LEXAPRO) 10 MG tablet TAKE 1 TABLET (10 MG) BY MOUTH DAILY 90 tablet 0     NOVOLOG FLEXPEN 100 UNIT/ML soln INJECT 12 UNITS SUBCUTANEOUS 3 TIMES DAILY (WITH MEALS) 15 mL 0     warfarin (COUMADIN) 7.5 MG tablet TAKE 1 TABLET BY MOUTH DAILY. CURRENT DOSE IS 7.5 MG DAILY. DOSE ADJUSTED PER INR RESULT. 90 tablet 3     Allergies   Allergen Reactions     Augmentin Nausea and Vomiting     Codeine Nausea and Vomiting     Phenobarbital Itching     Seasonal Allergies      Recent Labs   Lab Test  05/23/18   1028  04/18/18   1438   02/21/18   1230   01/31/18   1428  11/08/17   1432   10/06/17   1421   08/01/17   1146   06/22/17   1700   A1C  6.9*   --    --    --    --   7.0*   --    --   7.3*   --    --    < >   --    LDL  21   --    --   5   --    --    --    --    --    --    --    --   13   HDL  53   --    --   64   --    --    --    --    --    --    --    --   62   TRIG  113   --    --   188*   --    --    --    --    --    --    --    --   132   ALT  21   --    --    --    --    --    --    --   43   --   45   --    --    CR  0.90  0.90   < >  0.88   < >  0.96   --    < >  0.94   < >  0.90   < >  0.80   GFRESTIMATED  61  60*   < >  62   < >  56*   --    < >  58*   < >  61   < >  69   GFRESTBLACK  74  73   < >  76   < >  68   --    < >  70   < >  74   < >  84   POTASSIUM  3.5  4.3   < >  3.9   < >  4.5   --    < >  4.3   < >  3.5   < >  3.0*   TSH  2.42   --    --    --    --    --   1.40   --    --    --    --    --    --     < > = values in this interval not displayed.      BP Readings from Last 3 Encounters:   05/23/18 114/76   05/01/18 101/72   04/26/18 110/75    Wt Readings from Last 3 Encounters:   05/01/18 211 lb (95.7 kg)   04/26/18 211 lb (95.7 kg)   04/11/18 211 lb (95.7 kg)                  Labs reviewed in EPIC    Reviewed and updated as needed this visit by clinical staff  Tobacco  Allergies  Meds  Problems  Med Hx  Surg Hx  Fam Hx       Reviewed and updated as needed this visit by  Provider  Allergies  Meds  Problems         ROS:  Constitutional, HEENT, cardiovascular, pulmonary, GI, , musculoskeletal, neuro, skin, endocrine and psych systems are negative, except as otherwise noted.    OBJECTIVE:     /76  Pulse 82  Temp 98.6  F (37  C) (Tympanic)  Resp 16  SpO2 96%  There is no height or weight on file to calculate BMI.  GENERAL APPEARANCE: healthy, alert and no distress     EYES: PERRL, sclera clear     HENT: nose and mouth without ulcers or lesions     NECK: no adenopathy, no asymmetry, masses, or scars and thyroid normal to palpation     RESP: lungs clear to auscultation - no rales, rhonchi or wheezes     CV: irregularly irregular rhythm, normal rate, no murmur, click or rub     Diagnostic Test Results:  Results for orders placed or performed in visit on 05/23/18   Lipid panel reflex to direct LDL Fasting   Result Value Ref Range    Cholesterol 97 <200 mg/dL    Triglycerides 113 <150 mg/dL    HDL Cholesterol 53 >49 mg/dL    LDL Cholesterol Calculated 21 <100 mg/dL    Non HDL Cholesterol 44 <130 mg/dL   Hemoglobin A1c   Result Value Ref Range    Hemoglobin A1C 6.9 (H) 0 - 5.6 %   Comprehensive metabolic panel   Result Value Ref Range    Sodium 137 133 - 144 mmol/L    Potassium 3.5 3.4 - 5.3 mmol/L    Chloride 103 94 - 109 mmol/L    Carbon Dioxide 24 20 - 32 mmol/L    Anion Gap 10 3 - 14 mmol/L    Glucose 131 (H) 70 - 99 mg/dL    Urea Nitrogen 19 7 - 30 mg/dL    Creatinine 0.90 0.52 - 1.04 mg/dL    GFR Estimate 61 >60 mL/min/1.7m2    GFR Estimate If Black 74 >60 mL/min/1.7m2    Calcium 9.0 8.5 - 10.1 mg/dL    Bilirubin Total 0.7 0.2 - 1.3 mg/dL    Albumin 3.3 (L) 3.4 - 5.0 g/dL    Protein Total 7.7 6.8 - 8.8 g/dL    Alkaline Phosphatase 71 40 - 150 U/L    ALT 21 0 - 50 U/L    AST 15 0 - 45 U/L   TSH with free T4 reflex   Result Value Ref Range    TSH 2.42 0.40 - 4.00 mU/L     *Note: Due to a large number of results and/or encounters for the requested time period, some results  have not been displayed. A complete set of results can be found in Results Review.       ASSESSMENT/PLAN:       ICD-10-CM    1. Type 2 diabetes mellitus with hyperglycemia, with long-term current use of insulin (H) E11.65 Hemoglobin A1c    Z79.4 Comprehensive metabolic panel     TSH with free T4 reflex     CANCELED: Albumin Random Urine Quantitative with Creat Ratio   2. Major depressive disorder, recurrent episode, moderate (H) F33.1 Comprehensive metabolic panel   3. Essential hypertension with goal blood pressure less than 140/90 I10 Comprehensive metabolic panel     CANCELED: Albumin Random Urine Quantitative with Creat Ratio   4. Hyperlipidemia LDL goal <100 E78.5 Lipid panel reflex to direct LDL Fasting     Comprehensive metabolic panel   5. Permanent atrial fibrillation (H) I48.2    6. Atrial fibrillation with controlled ventricular response I48.91 warfarin (COUMADIN) 7.5 MG tablet   7. Herpes labialis B00.1 acyclovir (ZOVIRAX) 400 MG tablet     DM/HTN/Lipids- very good control with A1C at 6.9.  Had one episode of glucose at 500.  Unsure exactly why - was slightly ill, but felt okay, and responded appropriately with glucose checks and insulin per sliding scale and glucose improved within a couple hours.  Trust pt is able to deal with these episodes, but rec going to ER if unable to get levels back down or if having symptoms with high levels.  Cont with current diet and medication/insulin regimen.    Word-finding issues- mild sx's, after several grad parties, sounds more consistent with fatigue and possibly low oxygen levels (due to not using oxygen - needs portable option to take with her).  No other neurological sx's.    Pulmonary/Sjogrens/ILS/SOB- As above, pt tends to use her oxygen after being active at home, and brings it with her in the car, but tends to not haul it with her due to it's size.  It was also noted her O2 was down to mid 80s at her dental appt- after walking in without oxygen.  She could  benefit from a portable option.  Triage called her pulmonary team after her appointment, and they said they would work with her to get a portable option.  Pt also continues on prednisone 10mg/d through pulmonary.    Inflammatory Joint issues/Sjorgrens- Pt feels sx's are under pretty good control, though continues to have episodes when she has bad joint pain, and sx's have returned when her prednisone dose has been lowered.  We continued to discussed risks/pros/cons of doing a trial of the medication proposed by rheumatology - rituximab.  Prednisone can worsen her DM control (though currently good) and increases risk of osteoporosis.  Rituximab could have different se's, and can increase her risk for infection.  She will continue to consider it in the future, but at this point, she feels fairly good overall, and is wary of changing things at this time.    Groin rash- likely yeast infection, improving on ketoconazole cream- will continue until area resolves.      RTC 6 wks to f/u anxiety/mood sx's on lexapro 10mg/d (brought up concerns at end of visit), and f/u chronic cares. Discussed scheduling 45 min appts likely best.      Ilene Tristan MD  [facility]    Spent greater than 50% of 40 minutes in face to face time counseling patient regarding issues as above.

## 2018-05-23 NOTE — TELEPHONE ENCOUNTER
CW,   Spoke with patient   States [employer] is the company that currently supplies her oxygen  Called [employer] ([phone])  Representative I talked to not very helpful  Continued to tell her pt wants a tank she can take on a plane   She said they'd figure all that out with patient at later time once Rx received  They said they need new Rx for oxygen - last Rx from pulmonology 8/1/2016   Rx needs liter flow and size of tank to be included.   Once they get Rx, they will call pt to so she can determine what size tank works best for her.   Pended Rx with same directions as listed on Rx from 2 years ago.  Please advise.  Lydia HALEY RN    Triage: patient would like call with information - doesn't want [employer], also, fax Rx to [employer] [facility] at [phone]

## 2018-05-23 NOTE — MR AVS SNAPSHOT
After Visit Summary   5/23/2018    Betty Tee    MRN: [id number]           Patient Information     Date Of Birth          1940        Visit Information        Provider Department      5/23/2018 10:00 AM Ilene Tristan MD [facility]        Today's Diagnoses     Type 2 diabetes mellitus with hyperglycemia, with long-term current use of insulin (H)    -  1    Essential hypertension with goal blood pressure less than 140/90        Hyperlipidemia LDL goal <100        Word finding difficulty        Sjogren's syndrome with lung involvement (H)        Permanent atrial fibrillation (H)        Atrial fibrillation with controlled ventricular response        Herpes labialis        Major depressive disorder, recurrent episode, moderate (H)           Follow-ups after your visit        Your next 10 appointments already scheduled     May 31, 2018  2:00 PM CDT   Return Sleep Patient with LIZY Donaldson [facility] [place], Sleep Study ([facility])    [address] [place] [id number] [phone]            Jun 07, 2018  4:00 PM CDT   (Arrive by 3:45 PM)   Return Visit with Carmenza Stringer MD   [facility] Rheumatology ([facility])    [address]  [address]  [facility] [id number] [phone]            Jul 06, 2018 10:30 AM CDT   Office Visit with Ilene Tristan MD   [facility] ([facility])    [address] [id number] [phone]           Bring a current list of meds and any records pertaining to this visit. For Physicals, please bring immunization records and any forms needing to be filled out. Please arrive 10 minutes early to complete paperwork.            Jul 18, 2018 12:30 PM CDT   SIX MINUTE WALK with UC PFL A, UC PFL 6 MINUTE WALK 1   [facility] Pulmonary Function Testing ([facility])    [address]  3rd Floor  [facility] [address]   [phone]            Jul 18, 2018  1:30 PM CDT   (Arrive by 1:15 PM)   Return Interstitial Lung with Meño Walsh MD   [facility] ([facility])    [address] [place] [address]  [address]  [facility] [address]   [phone]            Oct 17, 2018 12:00 PM CDT   Lab with  LAB   [facility] ([facility])    [address] [place] [address]  1st Floor  [facility] [address] [phone]            Oct 17, 2018 12:30 PM CDT   (Arrive by 12:15 PM)   CORE RETURN with LIZY Tyler CNP   [facility] ([facility])    [address] [place] [address]  [address]  [facility] [address] [phone]              Who to contact     If you have questions or need follow up information about today's clinic visit or your schedule please contact [facility] directly at [phone].  Normal or non-critical lab and imaging results will be communicated to you by Attensahart, letter or phone within 4 business days after the clinic has received the results. If you do not hear from us within 7 days, please contact the clinic through Attensahart or phone. If you have a critical or abnormal lab result, we will notify you by phone as soon as possible.  Submit refill requests through thredUP or call your pharmacy and they will forward the refill request to us. Please allow 3 business days for your refill to be completed.          Additional Information About Your Visit        MyChart Information     thredUP gives you secure access to your electronic health record. If you see a primary care provider, you can also send messages to your care team and make appointments. If you have questions, please call your primary care clinic.  If you do not have a primary care provider, please call [phone] and they will assist you.        Care EveryWhere ID     This is  your Care EveryWhere ID. This could be used by other organizations to access your [facility] medical records  [id number]        Your Vitals Were     Pulse Temperature Respirations Pulse Oximetry          82 98.6  F (37  C) (Tympanic) 16 96%         Blood Pressure from Last 3 Encounters:   05/23/18 114/76   05/01/18 101/72   04/26/18 110/75    Weight from Last 3 Encounters:   05/01/18 211 lb (95.7 kg)   04/26/18 211 lb (95.7 kg)   04/11/18 211 lb (95.7 kg)              We Performed the Following     Comprehensive metabolic panel     Hemoglobin A1c     Lipid panel reflex to direct LDL Fasting     TSH with free T4 reflex          Today's Medication Changes          These changes are accurate as of 5/23/18 11:59 PM.  If you have any questions, ask your nurse or doctor.               These medicines have changed or have updated prescriptions.        Dose/Directions    * acyclovir 400 MG tablet   Commonly known as:  ZOVIRAX   This may have changed:  Another medication with the same name was added. Make sure you understand how and when to take each.   Changed by:  Ilene Tristan MD        Dose:  400 mg   Take 400 mg by mouth See Admin Instructions 5 times daily as needed for outbreaks   Refills:  0       * acyclovir 400 MG tablet   Commonly known as:  ZOVIRAX   This may have changed:  You were already taking a medication with the same name, and this prescription was added. Make sure you understand how and when to take each.   Used for:  Herpes labialis   Changed by:  Ilene Tristan MD        Dose:  400 mg   Take 1 tablet (400 mg) by mouth 3 times daily For a couple days   Quantity:  15 tablet   Refills:  2       acyclovir 5 % ointment   Commonly known as:  ZOVIRAX   This may have changed:  additional instructions   Used for:  Recurrent cold sores        Apply topically 6 times daily   Quantity:  15 g   Refills:  3       fluticasone 50 MCG/ACT spray   Commonly known as:  FLONASE   This may have changed:     - when to take this  - reasons to take this   Used for:  Seasonal allergic rhinitis        Dose:  1-2 spray   Spray 1-2 sprays into both nostrils daily   Quantity:  16 g   Refills:  5       * Notice:  This list has 2 medication(s) that are the same as other medications prescribed for you. Read the directions carefully, and ask your doctor or other care provider to review them with you.         Where to get your medicines      These medications were sent to [facility]/pharmacy #[id number] - [place], [place] - [address]  [address] [place] [address] [facility] [address]     Phone:  [phone]     acyclovir 400 MG tablet    warfarin 7.5 MG tablet                Primary Care Provider Office Phone # Fax #    Ilene Tristan [phone] [address] [address]        Equal Access to Services     [facility] Hospital[facility] : Hadii jossue sellers hadasho Sojefry, waaxda luqadaha, qaybta kaalmada adestasyada, anderson mercedes . So [facility] [phone].    ATENCIÓN: Si habla español, tiene a conti disposición servicios gratuitos de asistencia lingüística. Noel al [phone].    We comply with applicable federal civil rights laws and [place] laws. We do not discriminate on the basis of race, color, national origin, age, disability, sex, sexual orientation, or gender identity.            Thank you!     Thank you for choosing [facility]  for your care. Our goal is always to provide you with excellent care. Hearing back from our patients is one way we can continue to improve our services. Please take a few minutes to complete the written survey that you may receive in the mail after your visit with us. Thank you!             Your Updated Medication List - Protect others around you: Learn how to safely use, store and throw away your medicines at www.disposemymeds.org.          This list is accurate as of 5/23/18 11:59 PM.  Always use your most recent med list.                    Brand Name Dispense Instructions for use Diagnosis    * acyclovir 400 MG tablet    ZOVIRAX     Take 400 mg by mouth See Admin Instructions 5 times daily as needed for outbreaks        * acyclovir 400 MG tablet    ZOVIRAX    15 tablet    Take 1 tablet (400 mg) by mouth 3 times daily For a couple days    Herpes labialis       acyclovir 5 % ointment    ZOVIRAX    15 g    Apply topically 6 times daily    Recurrent cold sores       albuterol 108 (90 Base) MCG/ACT Inhaler    PROAIR HFA/PROVENTIL HFA/VENTOLIN HFA    1 Inhaler    Inhale 2 puffs into the lungs every 6 hours    ILD (interstitial lung disease) (H)       alendronate 70 MG tablet    FOSAMAX    4 tablet    Take 1 tablet (70 mg) by mouth every 7 days    Other osteoporosis without current pathological fracture       atorvastatin 10 MG tablet    LIPITOR    90 tablet    Take 1 tablet (10 mg) by mouth daily    Hyperlipidemia LDL goal <100       * blood glucose monitoring lancets     4 Box    Use to test blood sugar 4 times daily or as directed.    Type 2 diabetes mellitus without complication, without long-term current use of insulin (H)       * blood glucose monitoring lancets     1 Box    Use to test blood sugar 4 times daily or as directed.  Ok to substitute alternative if insurance prefers.    Type 2 diabetes mellitus without complication, without long-term current use of insulin (H)       * blood glucose monitoring test strip    ACCU-CHEK SHANNON PLUS    120 strip    Use to test blood sugar 4 times daily or as directed.  Ok to substitute alternative if insurance prefers.    Type 2 diabetes mellitus without complication, without long-term current use of insulin (H)       * ACCU-CHEK SHANNON PLUS test strip   Generic drug:  blood glucose monitoring     400 strip    USE TO TEST BLOOD SUGARS 4 TIMES DAILY OR AS DIRECTED    Type 2 diabetes mellitus without complication, without long-term current use of insulin (H)       COMPRESSION STOCKINGS     2 each     Wear compression stockings in affected leg (right leg) or both legs most of the time during the day and take them off at night.    DVT (deep venous thrombosis), right, Postphlebitic syndrome, Chronic anticoagulation, Atrial fibrillation (H)       escitalopram 10 MG tablet    LEXAPRO    90 tablet    TAKE 1 TABLET (10 MG) BY MOUTH DAILY    Major depressive disorder, recurrent episode, moderate (H)       fluticasone 220 MCG/ACT Inhaler    FLOVENT HFA    3 Inhaler    Inhale 2 puffs into the lungs 2 times daily    ILD (interstitial lung disease) (H), Follicular bronchiolitis (H)       fluticasone 50 MCG/ACT spray    FLONASE    16 g    Spray 1-2 sprays into both nostrils daily    Seasonal allergic rhinitis       * insulin pen needle 31G X 8 MM    B-D U/F    400 each    Use 4 times daily (with Lantus and Novolog) or as directed.    Type 2 diabetes mellitus without complication, without long-term current use of insulin (H)       * BD JANE U/F 32G X 4 MM   Generic drug:  insulin pen needle     400 each    USE 4 DAILY AS DIRECTED.    Type 2 diabetes mellitus without complication, without long-term current use of insulin (H)       ketoconazole 2 % cream    NIZORAL    60 g    Apply topically 2 times daily    Cutaneous candidiasis       LANTUS SOLOSTAR 100 UNIT/ML injection   Generic drug:  insulin glargine     15 mL    INJECT 25 UNTIS SUBCUTANEOUSLY EVERY DAY    Type 2 diabetes mellitus with hyperglycemia, with long-term current use of insulin (H)       lisinopril 2.5 MG tablet    PRINIVIL/Zestril    90 tablet    TAKE 1 TABLET (2.5 MG) BY MOUTH DAILY    Essential hypertension with goal blood pressure less than 140/90       metFORMIN 500 MG 24 hr tablet    GLUCOPHAGE-XR    180 tablet    TAKE 2 TABLETS BY MOUTH DAILY WITH DINNER    Type 2 diabetes mellitus without complication, without long-term current use of insulin (H)       * metoprolol succinate 100 MG 24 hr tablet    TOPROL-XL    90 tablet    Take 50 mg by mouth in  combination with 12.5 for a total of 62.5 twice a day    Atrial fibrillation with controlled ventricular response (H)       * metoprolol succinate 25 MG 24 hr tablet    TOPROL XL    30 tablet    Take 0.5 tablets (12.5 mg) by mouth 2 times daily Take 50 mg by mouth in combination with 12.5 for a total of 62.5 twice a day    (HFpEF) heart failure with preserved ejection fraction (H), Persistent atrial fibrillation (H)       montelukast 10 MG tablet    SINGULAIR    90 tablet    TAKE 1 TABLET BY MOUTH AT BEDTIME    Sjogren's syndrome with lung involvement (H), ILD (interstitial lung disease) (H), Chronic seasonal allergic rhinitis due to other allergen       NovoLOG FLEXPEN 100 UNIT/ML injection   Generic drug:  insulin aspart     15 mL    INJECT 12 UNITS SUBCUTANEOUS 3 TIMES DAILY (WITH MEALS)    Type 2 diabetes mellitus with other specified complication (H)       * order for DME     1 Device    Oxygen: Patient requires supplemental Oxygen 4 LPM via nasal canula with activity. Please provide patient with a home unit and with portability capability. Oxygen will be for a lifetime.    Hypoxia, ILD (interstitial lung disease) (H)       * order for DME     1 each    Equipment being ordered: Full face mask for CPAP - size: F10 large    ANGELICA (obstructive sleep apnea)       potassium chloride SA 20 MEQ CR tablet    K-DUR/KLOR-CON M    180 tablet    Increase KCL to 40 MEQ in AM and 20 MEQ in the evening.    (HFpEF) heart failure with preserved ejection fraction (H)       predniSONE 10 MG tablet    DELTASONE    90 tablet    Take 2 tablets for three days (4/12-14), then take 1 tablet daily (10 mg daily starting 4/15)    ILD (interstitial lung disease) (H), Sjogren's syndrome with lung involvement (H)       spironolactone 25 MG tablet    ALDACTONE    180 tablet    Take 2 tablets (50 mg) by mouth daily    Chronic diastolic congestive heart failure (H)       torsemide 20 MG tablet    DEMADEX    105 tablet    Take 40 mg in the  morning, 30 mg in the afternoon.    (HFpEF) heart failure with preserved ejection fraction (H)       traZODone 50 MG tablet    DESYREL    180 tablet    TAKE 1/2-1 TABLET BY MOUTH NIGHTLY AS NEEDED, CAN TITRATE DOWN TO 1/2TAB OR UP TO 2TABS AS NEEDED    Insomnia due to medical condition       TYLENOL 500 MG tablet   Generic drug:  acetaminophen      Take 500 mg by mouth nightly as needed    Dizziness       warfarin 7.5 MG tablet    COUMADIN    90 tablet    TAKE 1 TABLET BY MOUTH DAILY. CURRENT DOSE IS 7.5 MG DAILY. DOSE ADJUSTED PER INR RESULT.    Atrial fibrillation with controlled ventricular response (H)       * Notice:  This list has 12 medication(s) that are the same as other medications prescribed for you. Read the directions carefully, and ask your doctor or other care provider to review them with you.

## 2018-05-24 NOTE — PROGRESS NOTES
Here are your lab results from your recent visit...  -Your CMP (which includes electrolyte levels, blood sugar levels, and kidney and liver function tests) looks good other than the just slightly high glucose and slightly low albumin - both are fine.  -Your hemoglobin A1C (the three month average of your glucose levels) looks very good as we discussed yesterday.  -Your TSH (thyroid stimulating hormone, which is elevated in hypothyroidism, and lowered in hyperthyroidism) is normal.  -Your cholesterol panel looks very good overall.  Your LDL is still low at '21', but not as low as previously (at '5' and '13') after we lowered your lipitor again down to 10mg/day in March.  I think we can keep the lipitor at this dose, though we may lower the dose further again if your LDL comes back even lower in the future.  Keep up the great work on your diet- I think it's making a big difference.      Please let me know if you have any questions.  Best,   Lev Tristan MD

## 2018-06-07 ENCOUNTER — OFFICE VISIT (OUTPATIENT)
Dept: RHEUMATOLOGY | Facility: CLINIC | Age: 78
End: 2018-06-07
Attending: OBSTETRICS & GYNECOLOGY
Payer: MEDICARE

## 2018-06-07 VITALS
WEIGHT: 214.2 LBS | SYSTOLIC BLOOD PRESSURE: 117 MMHG | OXYGEN SATURATION: 93 % | HEART RATE: 103 BPM | BODY MASS INDEX: 33.55 KG/M2 | DIASTOLIC BLOOD PRESSURE: 79 MMHG

## 2018-06-07 DIAGNOSIS — M35.00 PRIMARY SJOGREN'S SYNDROME (H): Primary | ICD-10-CM

## 2018-06-07 PROCEDURE — G0463 HOSPITAL OUTPT CLINIC VISIT: HCPCS | Mod: ZF

## 2018-06-07 RX ORDER — PREDNISONE 1 MG/1
TABLET ORAL
Qty: 120 TABLET | Refills: 6 | Status: SHIPPED | OUTPATIENT
Start: 2018-06-07 | End: 2019-04-06

## 2018-06-07 RX ORDER — PREDNISONE 5 MG/1
TABLET ORAL
Qty: 30 TABLET | Refills: 6 | Status: SHIPPED | OUTPATIENT
Start: 2018-06-07 | End: 2019-02-05

## 2018-06-07 ASSESSMENT — PAIN SCALES - GENERAL: PAINLEVEL: NO PAIN (0)

## 2018-06-07 NOTE — MR AVS SNAPSHOT
After Visit Summary   6/7/2018    Betty Tee    MRN: [id number]           Patient Information     Date Of Birth          1940        Visit Information        Provider Department      6/7/2018 4:00 PM Carmenza Stringer MD [facility] Rheumatology        Today's Diagnoses     Primary Sjogren's syndrome (H)    -  1      Care Instructions    Start prednisone taper:  Take 10 mg and 9 mg every other day for 1 month  Then 9 mg/day for 1 month  Then alternate 9 mg and 8 mg every other day for 1 month  Then 8 mg/day for 1 month.    Continue taper until at 7 mg/day    - Recume fosamax  - Continue with calcium and vitamin D          Follow-ups after your visit        Follow-up notes from your care team     Return in about 6 months (around 12/7/2018).      Your next 10 appointments already scheduled     Jul 06, 2018 10:30 AM CDT   Office Visit with Ilene Tristan MD   [facility] ([facility])    [address] [id number] [phone]           Bring a current list of meds and any records pertaining to this visit. For Physicals, please bring immunization records and any forms needing to be filled out. Please arrive 10 minutes early to complete paperwork.            Jul 18, 2018 12:30 PM CDT   SIX MINUTE WALK with  PFL A,  PFL 6 MINUTE WALK 1   [facility] Pulmonary Function Testing ([facility])    [address]  [address] [facility] [address] [phone]            Jul 18, 2018  1:30 PM CDT   (Arrive by 1:15 PM)   Return Interstitial Lung with Meño Walsh MD   [facility] ([facility])    [address]  [address]  [facility] [id number] [phone]            Oct 17, 2018 12:00 PM CDT   Lab with  LAB   [facility] Lab ([facility])    [address] [place] [address]  [address] [facility] [address]    [phone]            Oct 17, 2018 12:30 PM CDT   (Arrive by 12:15 PM)   CORE RETURN with LIZY Tyler CNP   [facility] ([facility])    [address]  [address] [place] [id number] [phone]              Who to contact     If you have questions or need follow up information about today's clinic visit or your schedule please contact [facility] RHEUMATOLOGY directly at [phone].  Normal or non-critical lab and imaging results will be communicated to you by Newscronhart, letter or phone within 4 business days after the clinic has received the results. If you do not hear from us within 7 days, please contact the clinic through Handmarkt or phone. If you have a critical or abnormal lab result, we will notify you by phone as soon as possible.  Submit refill requests through Enterra Feed or call your pharmacy and they will forward the refill request to us. Please allow 3 business days for your refill to be completed.          Additional Information About Your Visit        NewscronharUpCounsel Information     Enter[employer] Feed gives you secure access to your electronic health record. If you see a primary care provider, you can also send messages to your care team and make appointments. If you have questions, please call your primary care clinic.  If you do not have a primary care provider, please call [phone] and they will assist you.        Care EveryWhere ID     This is your Care EveryWhere ID. This could be used by other organizations to access your [facility] medical records  [id number]        Your Vitals Were     Pulse Pulse Oximetry BMI (Body Mass Index)             103 93% 33.55 kg/m2          Blood Pressure from Last 3 Encounters:   06/07/18 117/79   05/23/18 114/76   05/01/18 101/72    Weight from Last 3 Encounters:   06/07/18 97.2 kg (214 lb 3.2 oz)   05/01/18 95.7 kg (211 lb)   04/26/18 95.7 kg (211 lb)              Today, you had the following     No orders found for  display         Today's Medication Changes          These changes are accurate as of 6/7/18  4:40 PM.  If you have any questions, ask your nurse or doctor.               These medicines have changed or have updated prescriptions.        Dose/Directions    acyclovir 5 % ointment   Commonly known as:  ZOVIRAX   This may have changed:  additional instructions   Used for:  Recurrent cold sores        Apply topically 6 times daily   Quantity:  15 g   Refills:  3       fluticasone 50 MCG/ACT spray   Commonly known as:  FLONASE   This may have changed:    - when to take this  - reasons to take this   Used for:  Seasonal allergic rhinitis        Dose:  1-2 spray   Spray 1-2 sprays into both nostrils daily   Quantity:  16 g   Refills:  5       * predniSONE 10 MG tablet   Commonly known as:  DELTASONE   This may have changed:  Another medication with the same name was added. Make sure you understand how and when to take each.   Used for:  ILD (interstitial lung disease) (H), Sjogren's syndrome with lung involvement (H)   Changed by:  Carmenza Stringer MD        Take 2 tablets for three days (4/12-14), then take 1 tablet daily (10 mg daily starting 4/15)   Quantity:  90 tablet   Refills:  3       * predniSONE 1 MG tablet   Commonly known as:  DELTASONE   This may have changed:  You were already taking a medication with the same name, and this prescription was added. Make sure you understand how and when to take each.   Used for:  Primary Sjogren's syndrome (H)   Changed by:  Carmenza Stringer MD        Use with 5 mg tabs to taper: 10 mg and 9 mg every other day for 1m, 9 mg/day for 1m, 9 mg and 8 mg every other day for 1m, etc. until at 7mg   Quantity:  120 tablet   Refills:  6       * predniSONE 5 MG tablet   Commonly known as:  DELTASONE   This may have changed:  You were already taking a medication with the same name, and this prescription was added. Make sure you understand how and when to take each.    Used for:  Primary Sjogren's syndrome (H)   Changed by:  Carmenza Stringer MD        Use with 1 mg tabs to taper: 10 mg and 9 mg every other day for 1m, 9 mg/day for 1m, 9 mg and 8 mg every other day for 1m, etc. until at 7mg   Quantity:  30 tablet   Refills:  6       * Notice:  This list has 3 medication(s) that are the same as other medications prescribed for you. Read the directions carefully, and ask your doctor or other care provider to review them with you.         Where to get your medicines      These medications were sent to [facility]/pharmacy #[id number] - [place], [place]  [address]  [address] [facility] [address]     Phone:  [phone]     predniSONE 1 MG tablet    predniSONE 5 MG tablet                Primary Care Provider Office Phone # Fax #    Ilene Tristan [phone] [address] [address]        Equal Access to Services     [facility] : Hadii aad ku hadasho Soomaali, waaxda luqadaha, qaybta kaalmada adeegyada, waxay luis mercedes . So [facility] [phone].    ATENCIÓN: Si habla español, tiene a conti disposición servicios gratuitos de asistencia lingüística. [facility] [phone].    We comply with applicable federal civil rights laws and [place] laws. We do not discriminate on the basis of race, color, national origin, age, disability, sex, sexual orientation, or gender identity.            Thank you!     Thank you for choosing [facility] RHEUMATOLOGY  for your care. Our goal is always to provide you with excellent care. Hearing back from our patients is one way we can continue to improve our services. Please take a few minutes to complete the written survey that you may receive in the mail after your visit with us. Thank you!             Your Updated Medication List - Protect others around you: Learn how to safely use, store and throw away your medicines at www.disposemymeds.org.           This list is accurate as of 6/7/18  4:40 PM.  Always use your most recent med list.                   Brand Name Dispense Instructions for use Diagnosis    * acyclovir 400 MG tablet    ZOVIRAX     Take 400 mg by mouth See Admin Instructions 5 times daily as needed for outbreaks        * acyclovir 400 MG tablet    ZOVIRAX    15 tablet    Take 1 tablet (400 mg) by mouth 3 times daily For a couple days    Herpes labialis       acyclovir 5 % ointment    ZOVIRAX    15 g    Apply topically 6 times daily    Recurrent cold sores       albuterol 108 (90 Base) MCG/ACT Inhaler    PROAIR HFA/PROVENTIL HFA/VENTOLIN HFA    1 Inhaler    Inhale 2 puffs into the lungs every 6 hours    ILD (interstitial lung disease) (H)       alendronate 70 MG tablet    FOSAMAX    4 tablet    Take 1 tablet (70 mg) by mouth every 7 days    Other osteoporosis without current pathological fracture       atorvastatin 10 MG tablet    LIPITOR    90 tablet    Take 1 tablet (10 mg) by mouth daily    Hyperlipidemia LDL goal <100       * blood glucose monitoring lancets     4 Box    Use to test blood sugar 4 times daily or as directed.    Type 2 diabetes mellitus without complication, without long-term current use of insulin (H)       * blood glucose monitoring lancets     1 Box    Use to test blood sugar 4 times daily or as directed.  Ok to substitute alternative if insurance prefers.    Type 2 diabetes mellitus without complication, without long-term current use of insulin (H)       * blood glucose monitoring test strip    ACCU-CHEK SHANNON PLUS    120 strip    Use to test blood sugar 4 times daily or as directed.  Ok to substitute alternative if insurance prefers.    Type 2 diabetes mellitus without complication, without long-term current use of insulin (H)       * ACCU-CHEK SHANNON PLUS test strip   Generic drug:  blood glucose monitoring     400 strip    USE TO TEST BLOOD SUGARS 4 TIMES DAILY OR AS DIRECTED    Type 2 diabetes mellitus without complication,  without long-term current use of insulin (H)       COMPRESSION STOCKINGS     2 each    Wear compression stockings in affected leg (right leg) or both legs most of the time during the day and take them off at night.    DVT (deep venous thrombosis), right, Postphlebitic syndrome, Chronic anticoagulation, Atrial fibrillation (H)       escitalopram 10 MG tablet    LEXAPRO    90 tablet    TAKE 1 TABLET (10 MG) BY MOUTH DAILY    Major depressive disorder, recurrent episode, moderate (H)       fluticasone 220 MCG/ACT Inhaler    FLOVENT HFA    3 Inhaler    Inhale 2 puffs into the lungs 2 times daily    ILD (interstitial lung disease) (H), Follicular bronchiolitis (H)       fluticasone 50 MCG/ACT spray    FLONASE    16 g    Spray 1-2 sprays into both nostrils daily    Seasonal allergic rhinitis       * insulin pen needle 31G X 8 MM    B-D U/F    400 each    Use 4 times daily (with Lantus and Novolog) or as directed.    Type 2 diabetes mellitus without complication, without long-term current use of insulin (H)       * BD JANE U/F 32G X 4 MM   Generic drug:  insulin pen needle     400 each    USE 4 DAILY AS DIRECTED.    Type 2 diabetes mellitus without complication, without long-term current use of insulin (H)       ketoconazole 2 % cream    NIZORAL    60 g    Apply topically 2 times daily    Cutaneous candidiasis       LANTUS SOLOSTAR 100 UNIT/ML injection   Generic drug:  insulin glargine     15 mL    INJECT 25 UNTIS SUBCUTANEOUSLY EVERY DAY    Type 2 diabetes mellitus with hyperglycemia, with long-term current use of insulin (H)       lisinopril 2.5 MG tablet    PRINIVIL/Zestril    90 tablet    TAKE 1 TABLET (2.5 MG) BY MOUTH DAILY    Essential hypertension with goal blood pressure less than 140/90       metFORMIN 500 MG 24 hr tablet    GLUCOPHAGE-XR    180 tablet    TAKE 2 TABLETS BY MOUTH DAILY WITH DINNER    Type 2 diabetes mellitus without complication, without long-term current use of insulin (H)       * metoprolol  succinate 100 MG 24 hr tablet    TOPROL-XL    90 tablet    Take 50 mg by mouth in combination with 12.5 for a total of 62.5 twice a day    Atrial fibrillation with controlled ventricular response (H)       * metoprolol succinate 25 MG 24 hr tablet    TOPROL XL    30 tablet    Take 0.5 tablets (12.5 mg) by mouth 2 times daily Take 50 mg by mouth in combination with 12.5 for a total of 62.5 twice a day    (HFpEF) heart failure with preserved ejection fraction (H), Persistent atrial fibrillation (H)       montelukast 10 MG tablet    SINGULAIR    90 tablet    TAKE 1 TABLET BY MOUTH AT BEDTIME    Sjogren's syndrome with lung involvement (H), ILD (interstitial lung disease) (H), Chronic seasonal allergic rhinitis due to other allergen       NovoLOG FLEXPEN 100 UNIT/ML injection   Generic drug:  insulin aspart     15 mL    INJECT 12 UNITS SUBCUTANEOUS 3 TIMES DAILY (WITH MEALS)    Type 2 diabetes mellitus with other specified complication (H)       * order for DME     1 Device    Oxygen: Patient requires supplemental Oxygen 4 LPM via nasal canula with activity. Please provide patient with a home unit and with portability capability. Oxygen will be for a lifetime.    Hypoxia, ILD (interstitial lung disease) (H)       * order for DME     1 each    Equipment being ordered: Full face mask for CPAP - size: F10 large    ANGELICA (obstructive sleep apnea)       potassium chloride SA 20 MEQ CR tablet    K-DUR/KLOR-CON M    180 tablet    Increase KCL to 40 MEQ in AM and 20 MEQ in the evening.    (HFpEF) heart failure with preserved ejection fraction (H)       * predniSONE 10 MG tablet    DELTASONE    90 tablet    Take 2 tablets for three days (4/12-14), then take 1 tablet daily (10 mg daily starting 4/15)    ILD (interstitial lung disease) (H), Sjogren's syndrome with lung involvement (H)       * predniSONE 1 MG tablet    DELTASONE    120 tablet    Use with 5 mg tabs to taper: 10 mg and 9 mg every other day for 1m, 9 mg/day for 1m, 9 mg  and 8 mg every other day for 1m, etc. until at 7mg    Primary Sjogren's syndrome (H)       * predniSONE 5 MG tablet    DELTASONE    30 tablet    Use with 1 mg tabs to taper: 10 mg and 9 mg every other day for 1m, 9 mg/day for 1m, 9 mg and 8 mg every other day for 1m, etc. until at 7mg    Primary Sjogren's syndrome (H)       spironolactone 25 MG tablet    ALDACTONE    180 tablet    Take 2 tablets (50 mg) by mouth daily    Chronic diastolic congestive heart failure (H)       torsemide 20 MG tablet    DEMADEX    105 tablet    Take 40 mg in the morning, 30 mg in the afternoon.    (HFpEF) heart failure with preserved ejection fraction (H)       traZODone 50 MG tablet    DESYREL    180 tablet    TAKE 1/2-1 TABLET BY MOUTH NIGHTLY AS NEEDED, CAN TITRATE DOWN TO 1/2TAB OR UP TO 2TABS AS NEEDED    Insomnia due to medical condition       TYLENOL 500 MG tablet   Generic drug:  acetaminophen      Take 500 mg by mouth nightly as needed    Dizziness       warfarin 7.5 MG tablet    COUMADIN    90 tablet    TAKE 1 TABLET BY MOUTH DAILY. CURRENT DOSE IS 7.5 MG DAILY. DOSE ADJUSTED PER INR RESULT.    Atrial fibrillation with controlled ventricular response (H)       * Notice:  This list has 15 medication(s) that are the same as other medications prescribed for you. Read the directions carefully, and ask your doctor or other care provider to review them with you.

## 2018-06-07 NOTE — PROGRESS NOTES
[facility]  Rheumatology Clinic  Carmenza Stringer MD  2018     Name: Betty Tee  MRN: [id number]  Age: 78 year old  : 1940  Referring provider: Referred Self     Assessment and Plan:    Primary Sjogren's syndrome with ILD (H)  Sister Sita has a slight decrease in functional capacity for the last 1 week, associated with intermittenr night sweats and chills, and increased difficulty breathing when off oxygen. I think she is likely having a mild viral upper respiratory illness currently. Has not increased her oxygen, and states she is comfortable when she uses it (4 l/min). We discussed the importance of using her oxygen both in and outside of the home. She is scheduled to see Dr. Walsh in July and will undergo repeat pulmonary function tests at that time after symptoms related to the viral illness have subsided. If there is noticeable decline in her pulmonary function tests, we will further consider starting rituximab. In the meantime, will start a slow taper of prednisone (9mg for one day then 10mg the next day for one month prior to going down to 9mg for one month, etc). She will continue to taper her medication until she is at 7mg per day. The risks of long-term prednisone use were, again, reviewed. She plans to resume Fosamax for at least another year and will continue with calcium and vitamin D supplementation while on this medication.     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 If unable to taper prednisone, we could try a mild oral DMARD like sulfasalazine, though the number of added pills will likely be bothersome for her.    Follow-up: Return in about 6 months (around 2018).     HPI:   Betty Tee is a 78 year old female with a history of primary Sjogren's  syndrome who presents for follow-up. She was diagnosed with Sjogren's syndrome in 2015 with borderline +RG, +SSA, and lip biopsy focus score of 1. Associated ILD and joint pain are prednisone-responsive which support the diagnosis. Ocular staining score has been deferred given other sources of diagnosis.    I last evaluated the patient on 03/08/2018 at which time she was doing rather well but did mention some trouble with intermittent arthralgias. We discussed consideration of rituximab given problems stemming from long-term prednisone use and she elected to think about this option. Plan was made for continuation of 10mg of prednisone daily for the time being. Recommended starting Fosamax, 70mg, once per week, however she was hesitant about side effects.     Today, the patient states she is feeling short of breath as she did not bring her oxygen tank with her. She states she has been feeling a bit more short of breath at home while going up and down her basement steps recently. She last saw her pulmonologist, Dr. Walsh, on 1/10/2018 and does not have recent pulmonary function tests. She has not needed to increase her oxygen levels at home, noting she has always used 4 liters, which seems to be sufficient for her. When she is out of the house she frequently leaves her oxygen tank at home. She does currently have an irritating, non-productive cough that began approximately one week ago with associated postnasal drip. She has been experiencing night sweats and infrequent chills this week but denies any measured fevers.    She denies any joint pains today, but does state she had a flare of joint pain, specifically in her right knee, a few weeks ago. The pain hit very intensely prior to resolving.     With regards to starting rituximab, she voices she discussed this with her primary care provider who does not feel this is a good idea.      Review of Systems:   Pertinent items are noted in HPI, remainder of complete ROS  is negative.      Active Medications:      ACCU-CHEK SHANNON PLUS test strip, USE TO TEST BLOOD SUGARS 4 TIMES DAILY OR AS DIRECTED, Disp: 400 strip, Rfl: 0     acetaminophen (TYLENOL) 500 MG tablet, Take 500 mg by mouth nightly as needed , Disp: , Rfl:      acyclovir (ZOVIRAX) 400 MG tablet, Take 1 tablet (400 mg) by mouth 3 times daily For a couple days, Disp: 15 tablet, Rfl: 2     acyclovir (ZOVIRAX) 400 MG tablet, Take 400 mg by mouth See Admin Instructions 5 times daily as needed for outbreaks, Disp: , Rfl:      acyclovir (ZOVIRAX) 5 % ointment, Apply topically 6 times daily (Patient taking differently: Apply topically 6 times daily As needed for outbreaks), Disp: 15 g, Rfl: 3     albuterol (PROAIR HFA, PROVENTIL HFA, VENTOLIN HFA) 108 (90 BASE) MCG/ACT inhaler, Inhale 2 puffs into the lungs every 6 hours, Disp: 1 Inhaler, Rfl: 3     alendronate (FOSAMAX) 70 MG tablet, Take 1 tablet (70 mg) by mouth every 7 days, Disp: 4 tablet, Rfl: 11     atorvastatin (LIPITOR) 10 MG tablet, Take 1 tablet (10 mg) by mouth daily, Disp: 90 tablet, Rfl: 0     BD JANE U/F 32G X 4 MM insulin pen needle, USE 4 DAILY AS DIRECTED., Disp: 400 each, Rfl: 0     blood glucose monitoring (ACCU-CHEK SHANNON PLUS) test strip, Use to test blood sugar 4 times daily or as directed.  Ok to substitute alternative if insurance prefers., Disp: 120 strip, Rfl: 11     blood glucose monitoring (ACCU-CHEK FASTCLIX) lancets, Use to test blood sugar 4 times daily or as directed.  Ok to substitute alternative if insurance prefers., Disp: 1 Box, Rfl: 11     blood glucose monitoring (ACCU-CHEK MULTICLIX) lancets, Use to test blood sugar 4 times daily or as directed., Disp: 4 Box, Rfl: 3     COMPRESSION STOCKINGS, Wear compression stockings in affected leg (right leg) or both legs most of the time during the day and take them off at night., Disp: 2 each, Rfl: 2     escitalopram (LEXAPRO) 10 MG tablet, TAKE 1 TABLET (10 MG) BY MOUTH DAILY, Disp: 90 tablet, Rfl:  0     fluticasone (FLONASE) 50 MCG/ACT nasal spray, Spray 1-2 sprays into both nostrils daily (Patient taking differently: Spray 1-2 sprays into both nostrils daily as needed for allergies ), Disp: 16 g, Rfl: 5     fluticasone (FLOVENT HFA) 220 MCG/ACT inhaler, Inhale 2 puffs into the lungs 2 times daily, Disp: 3 Inhaler, Rfl: 3     insulin pen needle (B-D U/F) 31G X 8 MM, Use 4 times daily (with Lantus and Novolog) or as directed., Disp: 400 each, Rfl: 3     ketoconazole (NIZORAL) 2 % cream, Apply topically 2 times daily, Disp: 60 g, Rfl: 1     LANTUS SOLOSTAR 100 UNIT/ML soln, INJECT 25 UNTIS SUBCUTANEOUSLY EVERY DAY, Disp: 15 mL, Rfl: 0     lisinopril (PRINIVIL/ZESTRIL) 2.5 MG tablet, TAKE 1 TABLET (2.5 MG) BY MOUTH DAILY, Disp: 90 tablet, Rfl: 0     metFORMIN (GLUCOPHAGE-XR) 500 MG 24 hr tablet, TAKE 2 TABLETS BY MOUTH DAILY WITH DINNER, Disp: 180 tablet, Rfl: 0     metoprolol succinate (TOPROL XL) 25 MG 24 hr tablet, Take 0.5 tablets (12.5 mg) by mouth 2 times daily Take 50 mg by mouth in combination with 12.5 for a total of 62.5 twice a day, Disp: 30 tablet, Rfl: 3     metoprolol succinate (TOPROL-XL) 100 MG 24 hr tablet, Take 50 mg by mouth in combination with 12.5 for a total of 62.5 twice a day, Disp: 90 tablet, Rfl: 3     montelukast (SINGULAIR) 10 MG tablet, TAKE 1 TABLET BY MOUTH AT BEDTIME, Disp: 90 tablet, Rfl: 1     NOVOLOG FLEXPEN 100 UNIT/ML soln, INJECT 12 UNITS SUBCUTANEOUS 3 TIMES DAILY (WITH MEALS), Disp: 15 mL, Rfl: 0     order for DME, Equipment being ordered: Full face mask for CPAP - size: F10 large, Disp: 1 each, Rfl: 0     order for DME, Oxygen: Patient requires supplemental Oxygen 4 LPM via nasal canula with activity. Please provide patient with a home unit and with portability capability. Oxygen will be for a lifetime., Disp: 1 Device, Rfl: 0     potassium chloride SA (K-DUR/KLOR-CON M) 20 MEQ CR tablet, Increase KCL to 40 MEQ in AM and 20 MEQ in the evening., Disp: 180 tablet, Rfl: 3      predniSONE (DELTASONE) 10 MG tablet, Take 2 tablets for three days (4/12-14), then take 1 tablet daily (10 mg daily starting 4/15), Disp: 90 tablet, Rfl: 3     spironolactone (ALDACTONE) 25 MG tablet, Take 2 tablets (50 mg) by mouth daily, Disp: 180 tablet, Rfl: 3     torsemide (DEMADEX) 20 MG tablet, Take 40 mg in the morning, 30 mg in the afternoon., Disp: 105 tablet, Rfl: 3     traZODone (DESYREL) 50 MG tablet, TAKE 1/2-1 TABLET BY MOUTH NIGHTLY AS NEEDED, CAN TITRATE DOWN TO 1/2TAB OR UP TO 2TABS AS NEEDED, Disp: 180 tablet, Rfl: 0     warfarin (COUMADIN) 7.5 MG tablet, TAKE 1 TABLET BY MOUTH DAILY. CURRENT DOSE IS 7.5 MG DAILY. DOSE ADJUSTED PER INR RESULT., Disp: 90 tablet, Rfl: 3      Allergies:   Augmentin; Codeine; Phenobarbital; and Seasonal allergies.      Past Medical History:  Alcohol abuse, in remission.   Allergic rhinitis.   Antiplatelet long-term use.   Atrial fibrillation.   Cardiomegaly.   Osteopenia.   Diverticulosis.   Benign essential hypertension.   GERD.   Insomnia.   Irregular heart beat.   Lumbago.   Major depressive disorder, recurrent episode, moderate.   ANGELICA.  Osteoarthrosis.   Sjogren's syndrome.   Sleep apnea.   Tobacco use disorder.   TMJ disorder.   RLS.  Major depressive disorder.   Hypertension.   Sciatica.   Colouterine fistula.   Left ventricular hypertrophy.   Breat fibroadenoma.   DVT.   Fatty liver disease, nonalcoholic.   Rib pain.   Neck mass.   Interstitial lung disease.   Acute and chronic respiratory failure with hypoxia.   Type II diabetes mellitus.   Hyperlipidemia.   Inflammatory arthritis.      Past Surgical History:  Back surgery.   Left breast biopsy.   Appendectomy.   Exploratory laparotomy.   Cardiac surgery.   Cholecystectomy.   Left colectomy.   Total abdominal hysterectomy with bilateral salpingo-oophorectomy.   Insert ureter stent.   Flexible sigmoidoscopy.   Ileostomy takedown.     Family History:   Mother: Positive for CAD, heart disease, hypertension,  cerebrovascular disease, hyperlipidemia.   Father: Positive for alcohol/drug abuse, Alzheimer disease, dementia, hypertension, hyperlipidemia.   Sister: Positive for CAD, hypertension, and colorectal cancer.   Sister: Positive for hypertension and hyperlipidemia.   Sister: Positive for diabetes, lung cancer, asthma, and heart disease.   Brother: Positive for Parkinsonism and substance abuse.   Brother: Positive for hypertension, diverticulitis, and prostate cancer.   Daughter: Positive for breast cancer.       Social History:   She is a former smoker; quit in 2011. She has a history of alcohol abuse but stopped drinking in 1986.      Physical Exam:   /79  Pulse 103  Wt 97.2 kg (214 lb 3.2 oz)  SpO2 93%  BMI 33.55 kg/m2   Wt Readings from Last 4 Encounters:   06/07/18 97.2 kg (214 lb 3.2 oz)   05/01/18 95.7 kg (211 lb)   04/26/18 95.7 kg (211 lb)   04/11/18 95.7 kg (211 lb)   Constitutional: Well-developed, appearing stated age; cooperative  Eyes: Normal EOM, PERRLA, vision, conjunctiva, sclera  ENT: Normal external ears, nose, hearing, lips, teeth, gums, throat. No mucous membrane lesions, normal saliva pool  Neck: No mass or thyroid enlargement  Resp: Good air movement, + mild scattered wheeze in the upper lobes BL  CV: RRR, no murmurs, rubs or gallops, no edema  GI: No ABD mass or tenderness, no HSM  : Not tested  Lymph: No cervical, supraclavicular, inguinal or epitrochlear nodes  MS: The TMJ, neck, shoulder, elbow, wrist, MCP/PIP/DIP, spine, hip, knee, ankle, and foot MTP/IP joints were examined and found normal. No active synovitis or altered joint anatomy. Full joint ROM. Normal  strength. No dactylitis,  tenosynovitis, enthesopathy.  Skin: No nail pitting, alopecia, rash, nodules or lesions  Neuro: grossly non-focal  Psych: Normal judgement, orientation, memory, affect.     Laboratory:   I reviewed the following labs:  Last CBC:  Lab Results   Component Value Date    WBC 15.6 11/08/2017    WBC  11.9 10/06/2017    WBC 11.6 09/12/2017     Lab Results   Component Value Date    RBC 4.59 11/08/2017     Lab Results   Component Value Date    HGB 13.4 11/08/2017    HGB 13.2 10/06/2017    HGB 13.1 09/12/2017     Lab Results   Component Value Date    HCT 42.1 11/08/2017     No components found for: MCT  Lab Results   Component Value Date    MCV 92 11/08/2017     Lab Results   Component Value Date    MCH 29.2 11/08/2017     Lab Results   Component Value Date    MCHC 31.8 11/08/2017     Lab Results   Component Value Date    RDW 16.7 11/08/2017     Lab Results   Component Value Date     11/08/2017     10/06/2017     09/12/2017       Last Basic Metabolic Panel:  Lab Results   Component Value Date     05/23/2018      Lab Results   Component Value Date    POTASSIUM 3.5 05/23/2018     Lab Results   Component Value Date    CHLORIDE 103 05/23/2018     Lab Results   Component Value Date    CLAUDY 9.0 05/23/2018     Lab Results   Component Value Date    CO2 24 05/23/2018     Lab Results   Component Value Date    BUN 19 05/23/2018     Lab Results   Component Value Date    CR 0.90 05/23/2018     Lab Results   Component Value Date     05/23/2018       No results found for: CKTOTAL  TSH   Date Value Ref Range Status   05/23/2018 2.42 0.40 - 4.00 mU/L Final   ]  Lab Results   Component Value Date    URIC 5.1 09/03/2015    URIC 5.8 09/21/2012     Lab Results   Component Value Date    CRP 51.5 04/11/2018    CRP 64.0 04/06/2017    CRP 54.0 04/04/2017       Liver Function Studies -   Recent Labs   Lab Test  05/23/18   1028  10/06/17   1421  08/01/17   1146   PROTTOTAL  7.7  7.8  7.5   ALBUMIN  3.3*  3.1*  3.2*   BILITOTAL  0.7  0.6  0.5   ALKPHOS  71  121  139   AST  15  37  40   ALT  21  43  45       UA RESULTS:  Recent Labs   Lab Test  08/30/17   1152  08/01/17   1107   COLOR  Yellow  Yellow   APPEARANCE  Clear  Clear   URINEGLC  Negative  Negative   URINEBILI  Negative  Negative   URINEKETONE  Negative   Negative   SG  1.009  1.010   UBLD  Small*  Trace*   URINEPH  6.0  6.0   PROTEIN  Negative  Negative   UROBILINOGEN   --   0.2   NITRITE  Negative  Negative   LEUKEST  Trace*  Small*   RBCU  18*  2-5*   WBCU  3*  5-10*       Autoimmunity labs:  Lab Results   Component Value Date    RHF <20 07/24/2015     No results found for: CCPIGG    Lab Results   Component Value Date    ANCA  07/24/2015     <1:20  Reference range: <1:20  (Note)  The ANCA IFA is <1:20; therefore, no further testing will  be performed.  INTERPRETIVE INFORMATION: Anti-Neutrophil Cyto Ab, IgG  Neutrophil Cytoplasmic Antibodies (C-ANCA = granular  cytoplasmic staining, P-ANCA = perinuclear staining) are  found in the serum of over 90 percent of patients with  certain necrotizing systemic vasculitides, and usually in  less than 5 percent of patients with collagen vascular  disease or arthritis.  Performed by [facility],  [address] [place] [id number] [phone]  [email or web address], Burke Mckeon MD, Lab. Director       No results found for: E7REKWZ  No results found for: S5NDIOK  Lab Results   Component Value Date    FRANCISCO <1.0  Interpretation:  Negative   07/24/2015     No results found for: DNA  Lab Results   Component Value Date    SSAIGG 1.6 (H) 07/24/2015    SSBIGG  07/24/2015     <0.2  Negative   Antibody index (AI) values reflect qualitative changes in antibody   concentration that cannot be directly associated with clinical condition or   disease state.      SCLIGG  07/24/2015     <0.2  Negative   Antibody index (AI) values reflect qualitative changes in antibody   concentration that cannot be directly associated with clinical condition or   disease state.           Scribe Disclosure:   Harshad SHERIDAN, am serving as a scribe to document services personally performed by Carmenza Stringer MD at this visit, based upon the provider's statements to me. All documentation has been reviewed by the aforementioned provider prior to being  entered into the official medical record.     Portions of this medical record were completed by a scribe. UPON MY REVIEW AND AUTHENTICATION BY ELECTRONIC SIGNATURE, this confirms (a) I performed the applicable clinical services, and (b) the record is accurate.      Attending Attestation:    I reviewed and edited the above scribed note to reflect my findings and plan.     I discussed the findings and recommendations with the patient.  Recommendations were discussed with the consulting team.  Time spent: 30 minutes    Carmenza Stringer MD, MS  Rheumatology Attending Physician  pgr [phone]

## 2018-06-07 NOTE — NURSING NOTE
Chief Complaint   Patient presents with     RECHECK     Sjogrens follow up     /79  Pulse 103  Wt 97.2 kg (214 lb 3.2 oz)  SpO2 93%  BMI 33.55 kg/m2  NORI CASTELLANOS CMA

## 2018-06-07 NOTE — PATIENT INSTRUCTIONS
Start prednisone taper:  Take 10 mg and 9 mg every other day for 1 month  Then 9 mg/day for 1 month  Then alternate 9 mg and 8 mg every other day for 1 month  Then 8 mg/day for 1 month.    Continue taper until at 7 mg/day    - Recume fosamax  - Continue with calcium and vitamin D

## 2018-06-08 DIAGNOSIS — E11.9 TYPE 2 DIABETES MELLITUS WITHOUT COMPLICATION, WITHOUT LONG-TERM CURRENT USE OF INSULIN (H): ICD-10-CM

## 2018-06-08 RX ORDER — METFORMIN HCL 500 MG
TABLET, EXTENDED RELEASE 24 HR ORAL
Qty: 180 TABLET | Refills: 0 | Status: SHIPPED | OUTPATIENT
Start: 2018-06-08 | End: 2018-09-02

## 2018-06-08 NOTE — TELEPHONE ENCOUNTER
"Prescription approved per [facility] Refill Protocol.  Sophia Hemphill RN  Requested Prescriptions   Signed Prescriptions Disp Refills     metFORMIN (GLUCOPHAGE-XR) 500 MG 24 hr tablet 180 tablet 0     Sig: TAKE 2 TABLETS BY MOUTH DAILY WITH DINNER    Biguanide Agents Passed    6/8/2018  9:10 AM       Passed - Blood pressure less than 140/90 in past 6 months    BP Readings from Last 3 Encounters:   06/07/18 117/79   05/23/18 114/76   05/01/18 101/72                Passed - Patient has documented LDL within the past 12 mos.    Recent Labs   Lab Test  05/23/18   1028   LDL  21            Passed - Patient has had a Microalbumin in the past 12 mos.    Recent Labs   Lab Test  10/06/17   1422   MICROL  14   UMALCR  29.65*            Passed - Patient is age 10 or older       Passed - Patient has documented A1c within the specified period of time.    If HgbA1C is 8 or greater, it needs to be on file within the past 3 months.  If less than 8, must be on file within the past 6 months.     Recent Labs   Lab Test  05/23/18   1028   A1C  6.9*            Passed - Patient's CR is NOT>1.4 OR Patient's EGFR is NOT<45 within past 12 mos.    Recent Labs   Lab Test  05/23/18   1028   GFRESTIMATED  61   GFRESTBLACK  74       Recent Labs   Lab Test  05/23/18   1028   CR  0.90            Passed - Patient does NOT have a diagnosis of CHF.       Passed - Patient is not pregnant       Passed - Patient has not had a positive pregnancy test within the past 12 mos.        Passed - Recent (6 mo) or future (30 days) visit within the authorizing provider's specialty    Patient had office visit in the last 6 months or has a visit in the next 30 days with authorizing provider or within the authorizing provider's specialty.  See \"Patient Info\" tab in inbasket, or \"Choose Columns\" in Meds & Orders section of the refill encounter.              "

## 2018-06-15 ENCOUNTER — HOSPITAL ENCOUNTER (EMERGENCY)
Facility: CLINIC | Age: 78
Discharge: HOME | End: 2018-06-15
Attending: EMERGENCY MEDICINE | Admitting: EMERGENCY MEDICINE
Payer: MEDICARE

## 2018-06-15 ENCOUNTER — APPOINTMENT (OUTPATIENT)
Dept: GENERAL RADIOLOGY | Facility: CLINIC | Age: 78
End: 2018-06-15
Attending: EMERGENCY MEDICINE
Payer: MEDICARE

## 2018-06-15 ENCOUNTER — CARE COORDINATION (OUTPATIENT)
Dept: PULMONOLOGY | Facility: CLINIC | Age: 78
End: 2018-06-15

## 2018-06-15 VITALS
BODY MASS INDEX: 33.27 KG/M2 | RESPIRATION RATE: 18 BRPM | WEIGHT: 212 LBS | DIASTOLIC BLOOD PRESSURE: 68 MMHG | OXYGEN SATURATION: 96 % | SYSTOLIC BLOOD PRESSURE: 99 MMHG | TEMPERATURE: 98.7 F | HEIGHT: 67 IN | HEART RATE: 84 BPM

## 2018-06-15 DIAGNOSIS — J01.90 ACUTE SINUSITIS WITH SYMPTOMS > 10 DAYS: ICD-10-CM

## 2018-06-15 PROCEDURE — 99284 EMERGENCY DEPT VISIT MOD MDM: CPT | Mod: Z6 | Performed by: EMERGENCY MEDICINE

## 2018-06-15 PROCEDURE — 71046 X-RAY EXAM CHEST 2 VIEWS: CPT

## 2018-06-15 PROCEDURE — 99283 EMERGENCY DEPT VISIT LOW MDM: CPT | Mod: 25 | Performed by: EMERGENCY MEDICINE

## 2018-06-15 RX ORDER — DOXYCYCLINE 100 MG/1
100 CAPSULE ORAL 2 TIMES DAILY
Qty: 28 CAPSULE | Refills: 0 | Status: SHIPPED | OUTPATIENT
Start: 2018-06-15 | End: 2019-04-16

## 2018-06-15 ASSESSMENT — ENCOUNTER SYMPTOMS
NAUSEA: 0
SHORTNESS OF BREATH: 1
VOMITING: 0
ABDOMINAL PAIN: 0
DIAPHORESIS: 1
COUGH: 1
FEVER: 0
DIARRHEA: 0

## 2018-06-15 NOTE — ED TRIAGE NOTES
"Triage Assessment & Note:    /75  Pulse 71  Temp 98.7  F (37.1  C) (Oral)  Resp 22  Ht 1.702 m (5' 7\")  Wt 96.2 kg (212 lb)  SpO2 96%  BMI 33.2 kg/m2    Patient presents with: c/o SOB with productive cough (green thick) x 2 weeks. Pt is concerned for pneumonia. ABC's WDL.     Home Treatments/Remedies: None    Febrile / Afebrile? Afebrile     Duration of C/o:  2 weeks     Jus Wallace  Salima 15, 2018      "

## 2018-06-15 NOTE — ED AVS SNAPSHOT
[facility], [place], Emergency Department    [address] [place] [id number]    Phone:  [phone]                                       Betty Tee   MRN: [id number]    Department:  [facility], Emergency Department   Date of Visit:  6/15/2018           After Visit Summary Signature Page     I have received my discharge instructions, and my questions have been answered. I have discussed any challenges I see with this plan with the nurse or doctor.    ..........................................................................................................................................  Patient/Patient Representative Signature      ..........................................................................................................................................  Patient Representative Print Name and Relationship to Patient    ..................................................               ................................................  Date                                            Time    ..........................................................................................................................................  Reviewed by Signature/Title    ...................................................              ..............................................  Date                                                            Time

## 2018-06-15 NOTE — PROGRESS NOTES
"Patient called with symptoms of profuse sweating, coughing up green sputum, and increase in shortness of breath. States that she saw rheumatology last week and at that point was \"wheezing\" slightly. She has gotten progressively worse and today feels like her breathing is more compromised.  She is looking for advice from Dr. Walsh.  Informed patient that Dr. Walsh is on vacation and that given the symptoms that she has described and the progressive nature of her symptoms, she needs to be seen in the ED today.  Patient states that she is going to contact her primary care physician and if she can not be seen this afternoon that she will go to the ED.    "

## 2018-06-15 NOTE — ED AVS SNAPSHOT
[facility], Emergency Department    [address] [address]    Phone:  [phone]                                       Betty Tee   MRN: [id number]    Department:  [facility], Emergency Department   Date of Visit:  6/15/2018           Patient Information     Date Of Birth          1940        Your diagnoses for this visit were:     Acute sinusitis with symptoms > 10 days        You were seen by Colton Botello MD.        Discharge Instructions       TODAY'S VISIT:  You were seen today for cough.  Chest x-ray without evidence of fever.  Given 2 weeks of nasal congestion will treat for sinusitis with doxycycline.       FOLLOW-UP:  Please make an appointment to follow up with:  - Your Primary Care Provider as soon as possible.    PRESCRIPTIONS / MEDICATIONS:  -    OTHER INSTRUCTIONS:  -     RETURN TO THE EMERGENCY DEPARTMENT  Return to the Emergency Department at any time for new/worsening symptoms.       Your next 10 appointments already scheduled     Jul 06, 2018 10:30 AM CDT   Office Visit with Ilene Tristan MD   [facility] ([facility])    [address] [place] [address] [id number] [phone]           Bring a current list of meds and any records pertaining to this visit. For Physicals, please bring immunization records and any forms needing to be filled out. Please arrive 10 minutes early to complete paperwork.            Jul 18, 2018 12:30 PM CDT   SIX MINUTE WALK with UC PFL A, UC PFL 6 MINUTE WALK 1   [facility] Pulmonary Function Testing ([email or web address] [place])    [address]  3rd Floor  [facility] [address] [phone]            Jul 18, 2018  1:30 PM CDT   (Arrive by 1:15 PM)   Return Interstitial Lung with Meño Walsh MD   [facility] ([email or web address] [place])    [address]  [address] [place] [id number] [phone]            Oct  17, 2018 12:00 PM CDT   Lab with  LAB    [facility] ([facility])    [address]  1st Floor  [facility] [id number] [phone]            Oct 17, 2018 12:30 PM CDT   (Arrive by 12:15 PM)   CORE RETURN with LIZY Tyler CNP   [facility] ([facility])    [address]  [address]  [facility] [id number] [phone]            Dec 06, 2018  3:00 PM CST   (Arrive by 2:45 PM)   Return Visit with Carmenza Stringer MD   [facility] ([facility])    [address]  [address]  [facility] [id number] [phone]              24 Hour Appointment Hotline       To make an appointment at any [facility], call [phone] ([phone]). If you don't have a family doctor or clinic, we will help you find one. [facility] are conveniently located to serve the needs of you and your family.             Review of your medicines      Our records show that you are taking the medicines listed below. If these are incorrect, please call your family doctor or clinic.        Dose / Directions Last dose taken    * acyclovir 400 MG tablet   Commonly known as:  ZOVIRAX   Dose:  400 mg        Take 400 mg by mouth See Admin Instructions 5 times daily as needed for outbreaks   Refills:  0        * acyclovir 400 MG tablet   Commonly known as:  ZOVIRAX   Dose:  400 mg   Quantity:  15 tablet        Take 1 tablet (400 mg) by mouth 3 times daily For a couple days   Refills:  2        acyclovir 5 % ointment   Commonly known as:  ZOVIRAX   Quantity:  15 g        Apply topically 6 times daily   Refills:  3        albuterol 108 (90 Base) MCG/ACT Inhaler   Commonly known as:  PROAIR HFA/PROVENTIL HFA/VENTOLIN HFA   Dose:  2 puff   Quantity:  1 Inhaler        Inhale 2 puffs into the lungs every 6 hours   Refills:  3        alendronate 70 MG tablet   Commonly known as:  FOSAMAX   Dose:  70 mg    Quantity:  4 tablet        Take 1 tablet (70 mg) by mouth every 7 days   Refills:  11        atorvastatin 10 MG tablet   Commonly known as:  LIPITOR   Dose:  10 mg   Quantity:  90 tablet        Take 1 tablet (10 mg) by mouth daily   Refills:  0        * blood glucose monitoring lancets   Quantity:  4 Box        Use to test blood sugar 4 times daily or as directed.   Refills:  3        * blood glucose monitoring lancets   Quantity:  1 Box        Use to test blood sugar 4 times daily or as directed.  Ok to substitute alternative if insurance prefers.   Refills:  11        * blood glucose monitoring test strip   Commonly known as:  ACCU-CHEK SHANNON PLUS   Quantity:  120 strip        Use to test blood sugar 4 times daily or as directed.  Ok to substitute alternative if insurance prefers.   Refills:  11        * ACCU-CHEK SHANNON PLUS test strip   Quantity:  400 strip   Generic drug:  blood glucose monitoring        USE TO TEST BLOOD SUGARS 4 TIMES DAILY OR AS DIRECTED   Refills:  0        COMPRESSION STOCKINGS   Quantity:  2 each        Wear compression stockings in affected leg (right leg) or both legs most of the time during the day and take them off at night.   Refills:  2        escitalopram 10 MG tablet   Commonly known as:  LEXAPRO   Quantity:  90 tablet        TAKE 1 TABLET (10 MG) BY MOUTH DAILY   Refills:  0        fluticasone 220 MCG/ACT Inhaler   Commonly known as:  FLOVENT HFA   Dose:  2 puff   Quantity:  3 Inhaler        Inhale 2 puffs into the lungs 2 times daily   Refills:  3        fluticasone 50 MCG/ACT spray   Commonly known as:  FLONASE   Dose:  1-2 spray   Quantity:  16 g        Spray 1-2 sprays into both nostrils daily   Refills:  5        * insulin pen needle 31G X 8 MM   Commonly known as:  B-D U/F   Quantity:  400 each        Use 4 times daily (with Lantus and Novolog) or as directed.   Refills:  3        * BD JANE U/F 32G X 4 MM   Quantity:  400 each   Generic drug:  insulin pen needle        USE  4 DAILY AS DIRECTED.   Refills:  0        ketoconazole 2 % cream   Commonly known as:  NIZORAL   Quantity:  60 g        Apply topically 2 times daily   Refills:  1        LANTUS SOLOSTAR 100 UNIT/ML injection   Quantity:  15 mL   Generic drug:  insulin glargine        INJECT 25 UNTIS SUBCUTANEOUSLY EVERY DAY   Refills:  0        lisinopril 2.5 MG tablet   Commonly known as:  PRINIVIL/Zestril   Quantity:  90 tablet        TAKE 1 TABLET (2.5 MG) BY MOUTH DAILY   Refills:  0        metFORMIN 500 MG 24 hr tablet   Commonly known as:  GLUCOPHAGE-XR   Quantity:  180 tablet        TAKE 2 TABLETS BY MOUTH DAILY WITH DINNER   Refills:  0        * metoprolol succinate 100 MG 24 hr tablet   Commonly known as:  TOPROL-XL   Quantity:  90 tablet        Take 50 mg by mouth in combination with 12.5 for a total of 62.5 twice a day   Refills:  3        * metoprolol succinate 25 MG 24 hr tablet   Commonly known as:  TOPROL XL   Dose:  12.5 mg   Quantity:  30 tablet        Take 0.5 tablets (12.5 mg) by mouth 2 times daily Take 50 mg by mouth in combination with 12.5 for a total of 62.5 twice a day   Refills:  3        montelukast 10 MG tablet   Commonly known as:  SINGULAIR   Quantity:  90 tablet        TAKE 1 TABLET BY MOUTH AT BEDTIME   Refills:  1        NovoLOG FLEXPEN 100 UNIT/ML injection   Quantity:  15 mL   Generic drug:  insulin aspart        INJECT 12 UNITS SUBCUTANEOUS 3 TIMES DAILY (WITH MEALS)   Refills:  0        * order for DME   Quantity:  1 Device        Oxygen: Patient requires supplemental Oxygen 4 LPM via nasal canula with activity. Please provide patient with a home unit and with portability capability. Oxygen will be for a lifetime.   Refills:  0        * order for DME   Quantity:  1 each        Equipment being ordered: Full face mask for CPAP - size: F10 large   Refills:  0        potassium chloride SA 20 MEQ CR tablet   Commonly known as:  K-DUR/KLOR-CON M   Quantity:  180 tablet        Increase KCL to 40 MEQ in  AM and 20 MEQ in the evening.   Refills:  3        * predniSONE 10 MG tablet   Commonly known as:  DELTASONE   Quantity:  90 tablet        Take 2 tablets for three days (4/12-14), then take 1 tablet daily (10 mg daily starting 4/15)   Refills:  3        * predniSONE 1 MG tablet   Commonly known as:  DELTASONE   Quantity:  120 tablet        Use with 5 mg tabs to taper: 10 mg and 9 mg every other day for 1m, 9 mg/day for 1m, 9 mg and 8 mg every other day for 1m, etc. until at 7mg   Refills:  6        * predniSONE 5 MG tablet   Commonly known as:  DELTASONE   Quantity:  30 tablet        Use with 1 mg tabs to taper: 10 mg and 9 mg every other day for 1m, 9 mg/day for 1m, 9 mg and 8 mg every other day for 1m, etc. until at 7mg   Refills:  6        spironolactone 25 MG tablet   Commonly known as:  ALDACTONE   Dose:  50 mg   Quantity:  180 tablet        Take 2 tablets (50 mg) by mouth daily   Refills:  3        torsemide 20 MG tablet   Commonly known as:  DEMADEX   Quantity:  105 tablet        Take 40 mg in the morning, 30 mg in the afternoon.   Refills:  3        traZODone 50 MG tablet   Commonly known as:  DESYREL   Quantity:  180 tablet        TAKE 1/2-1 TABLET BY MOUTH NIGHTLY AS NEEDED, CAN TITRATE DOWN TO 1/2TAB OR UP TO 2TABS AS NEEDED   Refills:  0        TYLENOL 500 MG tablet   Dose:  500 mg   Generic drug:  acetaminophen        Take 500 mg by mouth nightly as needed   Refills:  0        warfarin 7.5 MG tablet   Commonly known as:  COUMADIN   Quantity:  90 tablet        TAKE 1 TABLET BY MOUTH DAILY. CURRENT DOSE IS 7.5 MG DAILY. DOSE ADJUSTED PER INR RESULT.   Refills:  3        * Notice:  This list has 15 medication(s) that are the same as other medications prescribed for you. Read the directions carefully, and ask your doctor or other care provider to review them with you.            Procedures and tests performed during your visit     Chest XR,  PA & LAT      Orders Needing Specimen Collection     None       Pending Results     Date and Time Order Name Status Description    6/15/2018 1928 Chest XR,  PA & LAT Preliminary             Pending Culture Results     No orders found from 6/13/2018 to 6/16/2018.            Pending Results Instructions     If you had any lab results that were not finalized at the time of your Discharge, you can call the ED Lab Result RN at [phone]. You will be contacted by this team for any positive Lab results or changes in treatment. The nurses are available 7 days a week from 10A to 6:30P.  You can leave a message 24 hours per day and they will return your call.        Thank you for choosing [facility]       Thank you for choosing [facility] for your care. Our goal is always to provide you with excellent care. Hearing back from our patients is one way we can continue to improve our services. Please take a few minutes to complete the written survey that you may receive in the mail after you visit with us. Thank you!        [facility] Information     [facility] gives you secure access to your electronic health record. If you see a primary care provider, you can also send messages to your care team and make appointments. If you have questions, please call your primary care clinic.  If you do not have a primary care provider, please call [phone] and they will assist you.        Care EveryWhere ID     This is your Care EveryWhere ID. This could be used by other organizations to access your [facility] medical records  [id number]        Equal Access to Services     GENNY STONER : Hadmaribeth Trammell, waaxda luqadaha, qaybta kaalmada tanvi, anderson shaikh. So [facility] [phone].    ATENCIÓN: Si habla español, tiene a conti disposición servicios gratuitos de asistencia lingüística. Llame al [phone].    We comply with applicable federal civil rights laws and [place] laws. We do not discriminate on the basis of race, color, national origin, age, disability,  sex, sexual orientation, or gender identity.            After Visit Summary       This is your record. Keep this with you and show to your community pharmacist(s) and doctor(s) at your next visit.

## 2018-06-16 NOTE — ED PROVIDER NOTES
History     Chief Complaint   Patient presents with     Shortness of Breath     HPI  Betty Tee is a 78 year old female with a history of atrial fibrillation, CHF, ILD, diabetes, hypertension, Sjogren's, GERD who presents with cough and shortness of breath. The patient reports that she has had congestion for the past 2 weeks as well as a persistent productive cough for the past week. She complains of some shortness of breath, though notes that she has shortness of breath at baseline secondary to interstitial lung disease. She does have home oxygen, though she does not use this consistently. She reports that she is on chronic prednisone 10 mg daily for both Sjogren's and ILD; she denies being on any immunosuppression aside from the prednisone. She is following with a rheumatologist and notes that she had an appointment a few days ago where she reports her rheumatologist noted wheezing in her upper lobes. She reports that she was recommended to contact her pulmonologist though [place] and she was then referred to the ED. She reports that she has had pneumonia in the past, most recently about 1 year ago. She denies any allergies to medications. Denies fever, chest pain, lower extremity swelling, nausea, vomiting, diarrhea or abdominal pain. She complains of diaphoresis.  She is anticoagulated on warfarin for atrial fibrillation.    PAST MEDICAL HISTORY:   Past Medical History:   Diagnosis Date     Alcohol abuse, in remission      Allergic rhinitis, cause unspecified     allegra helps when she takes it     Antiplatelet or antithrombotic long-term use      Atrial fibrillation (H)     in hosp in 11/11 after surgery w/ fluid overload     Cardiomegaly     LVH on stress echo- cardiac w/u at [facility]- neg CT scan for PE, neg stress echo in 8/06     Chest pain, unspecified      Disorder of bone and cartilage, unspecified     osteopenia (had been on prempro), improved on 6/06 dexa, stable dexa 11/10      Diverticulosis of colon (without mention of hemorrhage)     last episode yrs ago     Essential hypertension, benign      Gastro-oesophageal reflux disease      Insomnia, unspecified     weaned off clonazepam     Irregular heart beat      Lumbago 7/09    MRI with DJD, now seeing Dr. Cain for sciatic sx's     Major depressive disorder, recurrent episode, moderate (H)      Obstructive sleep apnea      Osteoarthrosis, unspecified whether generalized or localized, unspecified site      Sjogren's syndrome (H)      Sleep apnea      Tobacco use disorder     chantix in 9/07, started again in 6/08, working       PAST SURGICAL HISTORY:   Past Surgical History:   Procedure Laterality Date     BACK SURGERY  1962     BIOPSY BREAST  9/27/02    Biopsy Left Breast     C APPENDECTOMY  1970's?     C NONSPECIFIC PROCEDURE  11/05    exploratory abd lap, adhesions, resolved RLQ pain, diverticulitis episodes     CARDIAC SURGERY       CHOLECYSTECTOMY  1990's?     COLECTOMY LEFT  11/7/2011    Procedure:COLECTOMY LEFT; Laparoscopic mobilization of splenic flexture, sigmoid colectomy, coloprotoscopy, loop illeostomy; Surgeon:CK CASTANEDA; Location:[facility]     HYSTERECTOMY TOTAL ABDOMINAL, BILATERAL SALPINGO-OOPHORECTOMY, COMBINED  11/7/2011    Procedure:COMBINED HYSTERECTOMY TOTAL ABDOMINAL, BILATERAL SALPINGO-OOPHORECTOMY; total abdominal hysterectomy, bilateral salpingo-oophorectomy; Surgeon:ALETA MANUEL; Location:[facility]     INSERT STENT URETER  11/7/2011    Procedure:INSERT STENT URETER; Placement of Bilateral Ureteral Stents ; Surgeon:PRANEETH BRYANT; Location:[facility]     SIGMOIDOSCOPY FLEXIBLE  11/3/2011    Procedure:SIGMOIDOSCOPY FLEXIBLE; Flexible Sigmoidoscopy; Surgeon:CK CASTANEDA; Location[facility]     TAKEDOWN ILEOSTOMY  2/1/2012    Procedure:TAKEDOWN ILEOSTOMY; Takedown Loop Ileostomy ; Surgeon:CK CASTANEDA; Location:[facility]       FAMILY HISTORY:   Family History   Problem Relation Age of Onset     C.A.D. Mother 63      MI- first at age 63     HEART DISEASE Mother      Hypertension Mother      CEREBROVASCULAR DISEASE Mother      Hyperlipidemia Mother      Alcohol/Drug Father      Alzheimer Disease Father      Dementia Father      Hypertension Father      Hyperlipidemia Father      C.A.D. Sister 52     Minor MI- age 50's     HEART DISEASE Sister      Hypertension Sister      Hypertension Sister      Hypertension Brother      Cancer - colorectal Sister 48     Late 40's early 50's     Prostate Cancer Brother 74     Dx'd age 74     GASTROINTESTINAL DISEASE Sister      Diverticulitis     GASTROINTESTINAL DISEASE Brother      Diverticulitis     Lipids Sister      Lipids Sister      Parkinsonism Brother      DIABETES Sister      HEART DISEASE Sister      CHF     CANCER Sister      lung, smoker     Substance Abuse Sister      Substance Abuse Brother      Asthma Sister      CANCER Sister      Breast Cancer Daughter      Prostate Cancer Brother      Hyperlipidemia Brother        SOCIAL HISTORY:   Social History   Substance Use Topics     Smoking status: Former Smoker     Packs/day: 0.50     Years: 10.00     Types: Cigarettes     Quit date: 8/1/2011     Smokeless tobacco: Never Used      Comment: 1/2 ppd     Alcohol use No      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     No current facility-administered medications for this encounter.      Current Outpatient Prescriptions   Medication     doxycycline (VIBRAMYCIN) 100 MG capsule     ACCU-CHEK SHANNON PLUS test strip     acetaminophen (TYLENOL) 500 MG tablet     acyclovir (ZOVIRAX) 400 MG tablet     acyclovir (ZOVIRAX) 400 MG tablet     acyclovir (ZOVIRAX) 5 % ointment     albuterol (PROAIR HFA, PROVENTIL HFA, VENTOLIN HFA) 108 (90 BASE) MCG/ACT inhaler     alendronate (FOSAMAX) 70 MG tablet     atorvastatin (LIPITOR) 10 MG tablet     BD JANE U/F 32G X 4 MM insulin pen needle     blood glucose monitoring (ACCU-CHEK SHANNON PLUS) test strip     blood glucose monitoring (ACCU-CHEK FASTCLIX) lancets  "    blood glucose monitoring (ACCU-CHEK MULTICLIX) lancets     COMPRESSION STOCKINGS     escitalopram (LEXAPRO) 10 MG tablet     fluticasone (FLONASE) 50 MCG/ACT nasal spray     fluticasone (FLOVENT HFA) 220 MCG/ACT inhaler     insulin pen needle (B-D U/F) 31G X 8 MM     ketoconazole (NIZORAL) 2 % cream     LANTUS SOLOSTAR 100 UNIT/ML soln     lisinopril (PRINIVIL/ZESTRIL) 2.5 MG tablet     metFORMIN (GLUCOPHAGE-XR) 500 MG 24 hr tablet     metoprolol succinate (TOPROL XL) 25 MG 24 hr tablet     metoprolol succinate (TOPROL-XL) 100 MG 24 hr tablet     montelukast (SINGULAIR) 10 MG tablet     NOVOLOG FLEXPEN 100 UNIT/ML soln     order for DME     order for DME     potassium chloride SA (K-DUR/KLOR-CON M) 20 MEQ CR tablet     predniSONE (DELTASONE) 1 MG tablet     predniSONE (DELTASONE) 10 MG tablet     predniSONE (DELTASONE) 5 MG tablet     spironolactone (ALDACTONE) 25 MG tablet     torsemide (DEMADEX) 20 MG tablet     traZODone (DESYREL) 50 MG tablet     warfarin (COUMADIN) 7.5 MG tablet        Allergies   Allergen Reactions     Augmentin Nausea and Vomiting     Codeine Nausea and Vomiting     Phenobarbital Itching     Seasonal Allergies       I have reviewed the Medications, Allergies, Past Medical and Surgical History, and Social History in the Epic system.    Review of Systems   Constitutional: Positive for diaphoresis. Negative for fever.   Respiratory: Positive for cough and shortness of breath.    Cardiovascular: Negative for chest pain and leg swelling.   Gastrointestinal: Negative for abdominal pain, diarrhea, nausea and vomiting.   All other systems reviewed and are negative.      Physical Exam   BP: 114/75  Pulse: 71  Temp: 98.7  F (37.1  C)  Resp: 22  Height: 170.2 cm (5' 7\")  Weight: 96.2 kg (212 lb)  SpO2: 96 %      Physical Exam   General: awake, alert, NAD  Head: normal cephalic  HEENT: pupils equal, conjugate gaze in tact  Neck: Supple  CV: regular rate and rhythm without murmur  Lungs: clear to " ascultation bilaterally  EXT: Trace bilateral lower extremity edema.   Neuro: awake, answers questions appropriately. No focal deficits noted      ED Course     ED Course     Procedures     7:15 PM  The patient was seen and examined by Dr. Botello in Room 18.          EKG: Declined       Labs Ordered and Resulted from Time of ED Arrival Up to the Time of Departure from the ED - No data to display         Assessments & Plan (with Medical Decision Making)   Betty Buckner is a 78-year-old female who presents with productive cough, rhinorrhea, some worsening shortness of breath at night over the course of the last 2 weeks.  Patient is immunocompromised as she takes daily prednisone for her interstitial lung disease and Sjogren's.  Has many comorbidities including atrial fibrillation, immunosuppression, underlying lung disease, congestive heart failure.  Broad differential includes infection ( viral versus bacterial), ACS, worsening CHF, or other.    Patient is very clear that she is here only to get a chest x-ray.  She reports her rheumatologist had heard wheezing on her appointment a couple days ago and insisted she get a chest x-ray if her symptoms do not improve.  Did discuss this could be a presentation for other things such as worsening CHF, MI, or other acute life-threatening pathology though patient declined and she understood this but only wanted a chest x-ray.  She also declined lab work.    Chest x-ray obtained showed no acute airspace disease.    Given her immune compromise and 2 weeks symptoms and what appears to be sinusitis will treat with doxycycline.    Patient has close primary follow-up already planned she will follow-up with her primary care provider.  She knows she will have to have a repeat INR given that she is starting antibiotics that could interfere with her Coumadin.  She will return to the emergency department for new or worsening symptoms    I have reviewed the nursing notes.    I have  reviewed the findings, diagnosis, plan and need for follow up with the patient.    Discharge Medication List as of 6/15/2018  8:28 PM          Final diagnoses:   Acute sinusitis with symptoms > 10 days     Brittani SHERIDAN, am serving as a trained medical scribe to document services personally performed by Colton Botello MD, based on the provider's statements to me.   Colton SHERIDAN MD, was physically present and have reviewed and verified the accuracy of this note documented by Brittani Green.     6/15/2018   [facility], EMERGENCY DEPARTMENT     Colton Botello MD  06/16/18 0132

## 2018-06-16 NOTE — DISCHARGE INSTRUCTIONS
TODAY'S VISIT:  You were seen today for cough.  Chest x-ray without evidence of fever.  Given 2 weeks of nasal congestion will treat for sinusitis with doxycycline. Get your INR checked in a few days due to adding ABX and being on coumadin.       FOLLOW-UP:  Please make an appointment to follow up with:  - Your Primary Care Provider as soon as possible.    PRESCRIPTIONS / MEDICATIONS:  -    OTHER INSTRUCTIONS:  -     RETURN TO THE EMERGENCY DEPARTMENT  Return to the Emergency Department at any time for new/worsening symptoms.

## 2018-06-20 ENCOUNTER — TELEPHONE (OUTPATIENT)
Dept: FAMILY MEDICINE | Facility: CLINIC | Age: 78
End: 2018-06-20

## 2018-06-20 ENCOUNTER — APPOINTMENT (OUTPATIENT)
Dept: LAB | Facility: CLINIC | Age: 78
End: 2018-06-20
Payer: MEDICARE

## 2018-06-20 DIAGNOSIS — I48.21 PERMANENT ATRIAL FIBRILLATION (H): ICD-10-CM

## 2018-06-20 DIAGNOSIS — Z79.01 LONG TERM CURRENT USE OF ANTICOAGULANT THERAPY: ICD-10-CM

## 2018-06-20 LAB — INR POINT OF CARE: 1.7 (ref 0.86–1.14)

## 2018-06-20 PROCEDURE — 36416 COLLJ CAPILLARY BLOOD SPEC: CPT | Performed by: FAMILY MEDICINE

## 2018-06-20 PROCEDURE — 85610 PROTHROMBIN TIME: CPT | Mod: QW | Performed by: FAMILY MEDICINE

## 2018-06-20 PROCEDURE — 99207 ZZC NO CHARGE NURSE ONLY: CPT | Performed by: FAMILY MEDICINE

## 2018-06-26 ENCOUNTER — TELEPHONE (OUTPATIENT)
Dept: FAMILY MEDICINE | Facility: CLINIC | Age: 78
End: 2018-06-26

## 2018-06-26 NOTE — TELEPHONE ENCOUNTER
Received forms from [facility] regards DM supplies - Spoke with Sabrina MONTEMAYOR she says justification needed for pt to test more then once per day if non insulin and more then 2 times per day if uses insulin documentation should include OV notes from date of prescription needed to justify testing amount.      Forms in your box.    Please advise.    Thanks!

## 2018-07-06 ENCOUNTER — OFFICE VISIT (OUTPATIENT)
Dept: FAMILY MEDICINE | Facility: CLINIC | Age: 78
End: 2018-07-06
Payer: MEDICARE

## 2018-07-06 ENCOUNTER — TELEPHONE (OUTPATIENT)
Dept: FAMILY MEDICINE | Facility: CLINIC | Age: 78
End: 2018-07-06
Payer: MEDICARE

## 2018-07-06 VITALS
DIASTOLIC BLOOD PRESSURE: 73 MMHG | HEART RATE: 87 BPM | HEIGHT: 67 IN | BODY MASS INDEX: 32.96 KG/M2 | SYSTOLIC BLOOD PRESSURE: 114 MMHG | WEIGHT: 210 LBS | OXYGEN SATURATION: 97 % | TEMPERATURE: 98.1 F

## 2018-07-06 DIAGNOSIS — I48.21 PERMANENT ATRIAL FIBRILLATION (H): ICD-10-CM

## 2018-07-06 DIAGNOSIS — Z79.4 TYPE 2 DIABETES MELLITUS WITH HYPERGLYCEMIA, WITH LONG-TERM CURRENT USE OF INSULIN (H): ICD-10-CM

## 2018-07-06 DIAGNOSIS — E11.69 TYPE 2 DIABETES MELLITUS WITH OTHER SPECIFIED COMPLICATION (H): ICD-10-CM

## 2018-07-06 DIAGNOSIS — E11.65 TYPE 2 DIABETES MELLITUS WITH HYPERGLYCEMIA, WITH LONG-TERM CURRENT USE OF INSULIN (H): ICD-10-CM

## 2018-07-06 DIAGNOSIS — I50.30 (HFPEF) HEART FAILURE WITH PRESERVED EJECTION FRACTION (H): ICD-10-CM

## 2018-07-06 DIAGNOSIS — I50.33 ACUTE ON CHRONIC DIASTOLIC HEART FAILURE (H): ICD-10-CM

## 2018-07-06 DIAGNOSIS — F33.1 MAJOR DEPRESSIVE DISORDER, RECURRENT EPISODE, MODERATE (H): Primary | ICD-10-CM

## 2018-07-06 DIAGNOSIS — Z79.01 LONG TERM CURRENT USE OF ANTICOAGULANT THERAPY: ICD-10-CM
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
## 2018-07-06 DIAGNOSIS — R35.1 NOCTURIA: ICD-10-CM

## 2018-07-06 DIAGNOSIS — J96.21 ACUTE AND CHRONIC RESPIRATORY FAILURE WITH HYPOXIA (H): ICD-10-CM

## 2018-07-06 LAB — INR POINT OF CARE: 1.6 (ref 0.86–1.14)

## 2018-07-06 PROCEDURE — 36416 COLLJ CAPILLARY BLOOD SPEC: CPT | Performed by: FAMILY MEDICINE

## 2018-07-06 PROCEDURE — 99214 OFFICE O/P EST MOD 30 MIN: CPT | Performed by: FAMILY MEDICINE

## 2018-07-06 PROCEDURE — 99207 ZZC NO CHARGE NURSE ONLY: CPT | Performed by: FAMILY MEDICINE

## 2018-07-06 PROCEDURE — 85610 PROTHROMBIN TIME: CPT | Mod: QW | Performed by: FAMILY MEDICINE

## 2018-07-06 RX ORDER — ESCITALOPRAM OXALATE 20 MG/1
TABLET ORAL
Qty: 30 TABLET | Refills: 1 | Status: SHIPPED | OUTPATIENT
Start: 2018-07-06 | End: 2018-08-17

## 2018-07-06 ASSESSMENT — PATIENT HEALTH QUESTIONNAIRE - PHQ9: 5. POOR APPETITE OR OVEREATING: NOT AT ALL

## 2018-07-06 ASSESSMENT — ANXIETY QUESTIONNAIRES
6. BECOMING EASILY ANNOYED OR IRRITABLE: MORE THAN HALF THE DAYS
1. FEELING NERVOUS, ANXIOUS, OR ON EDGE: NOT AT ALL
GAD7 TOTAL SCORE: 2
3. WORRYING TOO MUCH ABOUT DIFFERENT THINGS: NOT AT ALL
IF YOU CHECKED OFF ANY PROBLEMS ON THIS QUESTIONNAIRE, HOW DIFFICULT HAVE THESE PROBLEMS MADE IT FOR YOU TO DO YOUR WORK, TAKE CARE OF THINGS AT HOME, OR GET ALONG WITH OTHER PEOPLE: NOT DIFFICULT AT ALL
5. BEING SO RESTLESS THAT IT IS HARD TO SIT STILL: NOT AT ALL
7. FEELING AFRAID AS IF SOMETHING AWFUL MIGHT HAPPEN: NOT AT ALL
2. NOT BEING ABLE TO STOP OR CONTROL WORRYING: NOT AT ALL

## 2018-07-06 NOTE — PROGRESS NOTES
SUBJECTIVE:   Betty Tee is a 78 year old female who presents to clinic today for the following health issues:    Pt here to follow up but would like to discuss a fall that happened last week - pt fell backwards Lt knee gave out and depression.    Fall-   Coming from the car [place], carrying things in both hands.  Stepped up one stair to porch- left knee just gave out.  She fell backwards, hit her back on the cement.  Still has discomfort in her back- just took a few tylenol.  Knee has been fine.  Head is fine, head was on the grass.      MDD-   Mood is not good.  Feels santillan, reactive.  Feels depressed- feels really down.  Not fun to be around.  Close friends agree that she's much more irritable.  Sx's worse than when she was stable on the paxil.  PHQ-9 today is 8.  Usually 4-6s.  On lexapro 10mg/d, switched from paxil in 10/17.        Diabetes Follow-up  Patient is checking blood sugars: three times daily.   Blood sugar testing frequency justification: Patient modifying lifestyle changes (diet, exercise) with blood sugars  Results are as follows:         am - bit lower, 105-120s         lunchtime - 130-160         suppertime - 130-160    Diabetic concerns: None     Symptoms of hypoglycemia (low blood sugar): none     Paresthesias (numbness or burning in feet) or sores: No     Date of last diabetic eye exam: 8/17    Last A1C 6.9 on 5/23/18.    Med review-   Nocturia- ~5x/night.  Going on for awhile.  Taking torsemide 40mg morning, 30mg evening- usually right after dinner.  Takes lipitor and coumadin both in the evening.    Currently taking lasix in the am and with 10pm meds- no mid-day.      SOB- never got a call from pulmonary about portable oxygen.  Does meet with them next week so will discuss.  Doing fine now, but is out of breath getting to the store.  Doing social things, but the oxygen tank is in the car.  [place] and [place].        BP Readings from Last 2  Encounters:   07/25/18 105/73   07/25/18 105/73     Hemoglobin A1C (%)   Date Value   05/23/2018 6.9 (H)   01/31/2018 7.0 (H)     LDL Cholesterol Calculated (mg/dL)   Date Value   05/23/2018 21   02/21/2018 5       Diabetes Management Resources    Problem list and histories reviewed & adjusted, as indicated.  Additional history: as documented    Patient Active Problem List   Diagnosis     Temporomandibular joint disorder     Diverticulitis of colon     Disorder of bone and cartilage     Osteoarthritis     Alcohol abuse, in remission     Restless legs syndrome (RLS)     Major depressive disorder, recurrent episode, moderate (H)     Essential hypertension with goal blood pressure less than 140/90     Sciatica     Advanced directives, counseling/discussion     Colouterine fistula     Permanent atrial fibrillation (H)     Left ventricular hypertrophy     Health Care Home     Insomnia     Joint pains     Allergic reaction caused by a drug - likely plaquenil     Breast fibroadenoma     DVT (deep venous thrombosis) (H)     Fatty liver disease, nonalcoholic     Rib pain     Neck mass     Gastroesophageal reflux disease without esophagitis     Enlarged lymph node     Sjogren's syndrome with lung involvement (H)     ANGELICA (obstructive sleep apnea)     ILD (interstitial lung disease) (H)     Lower GI bleed     Acute and chronic respiratory failure with hypoxia (H)     (HFpEF) heart failure with preserved ejection fraction (H)     Type 2 diabetes mellitus with hyperglycemia, with long-term current use of insulin (H)     Heart failure, chronic, with acute decompensation (H)     Hyperlipidemia LDL goal <100     Long term current use of anticoagulant therapy     Inflammatory arthritis     Past Surgical History:   Procedure Laterality Date     BACK SURGERY  1962     BIOPSY BREAST  9/27/02    Biopsy Left Breast     C APPENDECTOMY  1970's?     C NONSPECIFIC PROCEDURE  11/05    exploratory abd lap, adhesions, resolved RLQ pain,  diverticulitis episodes     CARDIAC SURGERY       CHOLECYSTECTOMY  1990's?     COLECTOMY LEFT  11/7/2011    Procedure:COLECTOMY LEFT; Laparoscopic mobilization of splenic flexture, sigmoid colectomy, coloprotoscopy, loop illeostomy; Surgeon:CK CASTANEDA; Location:[facility]     HYSTERECTOMY TOTAL ABDOMINAL, BILATERAL SALPINGO-OOPHORECTOMY, COMBINED  11/7/2011    Procedure:COMBINED HYSTERECTOMY TOTAL ABDOMINAL, BILATERAL SALPINGO-OOPHORECTOMY; total abdominal hysterectomy, bilateral salpingo-oophorectomy; Surgeon:ALETA MANUEL; Location:[facility]     INSERT STENT URETER  11/7/2011    Procedure:INSERT STENT URETER; Placement of Bilateral Ureteral Stents ; Surgeon:PRANEETH BRYANT; Location:[facility]     SIGMOIDOSCOPY FLEXIBLE  11/3/2011    Procedure:SIGMOIDOSCOPY FLEXIBLE; Flexible Sigmoidoscopy; Surgeon:CK CASTANEDA; Location:[facility]     TAKEDOWN ILEOSTOMY  2/1/2012    Procedure:TAKEDOWN ILEOSTOMY; Takedown Loop Ileostomy ; Surgeon:CK CASTANEDA; Location:[facility]       Social History   Substance Use Topics     Smoking status: Former Smoker     Packs/day: 0.50     Years: 10.00     Types: Cigarettes     Quit date: 8/1/2011     Smokeless tobacco: Never Used      Comment: 1/2 ppd     Alcohol use No      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     Family History   Problem Relation Age of Onset     C.A.D. Mother 63     MI- first at age 63     HEART DISEASE Mother      Hypertension Mother      Cerebrovascular Disease Mother      Hyperlipidemia Mother      Alcohol/Drug Father      Alzheimer Disease Father      Dementia Father      Hypertension Father      Hyperlipidemia Father      C.A.D. Sister 52     Minor MI- age 50's     HEART DISEASE Sister      Hypertension Sister      Hypertension Sister      Hypertension Brother      Cancer - colorectal Sister 48     Late 40's early 50's     Prostate Cancer Brother 74     Dx'd age 74     GASTROINTESTINAL DISEASE Sister      Diverticulitis     GASTROINTESTINAL DISEASE Brother       Diverticulitis     Lipids Sister      Lipids Sister      Parkinsonism Brother      Diabetes Sister      HEART DISEASE Sister      CHF     Cancer Sister      lung, smoker     Substance Abuse Sister      Substance Abuse Brother      Asthma Sister      Cancer Sister      Breast Cancer Daughter      Prostate Cancer Brother      Hyperlipidemia Brother          Current Outpatient Prescriptions   Medication Sig Dispense Refill     ACCU-CHEK SHANNON PLUS test strip USE TO TEST BLOOD SUGARS 4 TIMES DAILY OR AS DIRECTED 400 strip 0     acyclovir (ZOVIRAX) 5 % ointment Apply topically 6 times daily (Patient taking differently: Apply topically 6 times daily As needed for outbreaks) 15 g 3     albuterol (PROAIR HFA, PROVENTIL HFA, VENTOLIN HFA) 108 (90 BASE) MCG/ACT inhaler Inhale 2 puffs into the lungs every 6 hours 1 Inhaler 3     alendronate (FOSAMAX) 70 MG tablet Take 1 tablet (70 mg) by mouth every 7 days (Patient not taking: Reported on 7/25/2018) 4 tablet 11     atorvastatin (LIPITOR) 10 MG tablet Take 1 tablet (10 mg) by mouth daily 90 tablet 0     BD JANE U/F 32G X 4 MM insulin pen needle USE 4 DAILY AS DIRECTED. 400 each 0     blood glucose monitoring (ACCU-CHEK SHANNON PLUS) test strip Use to test blood sugar 4 times daily or as directed.  Ok to substitute alternative if insurance prefers. 120 strip 11     blood glucose monitoring (ACCU-CHEK FASTCLIX) lancets Use to test blood sugar 4 times daily or as directed.  Ok to substitute alternative if insurance prefers. 1 Box 11     blood glucose monitoring (ACCU-CHEK MULTICLIX) lancets Use to test blood sugar 4 times daily or as directed. 4 Box 3     COMPRESSION STOCKINGS Wear compression stockings in affected leg (right leg) or both legs most of the time during the day and take them off at night. 2 each 2     escitalopram (LEXAPRO) 20 MG tablet TAKE 1 TABLET (20 MG) BY MOUTH DAILY 30 tablet 1     fluticasone (FLONASE) 50 MCG/ACT nasal spray Spray 1-2 sprays into both nostrils  daily (Patient taking differently: Spray 1-2 sprays into both nostrils daily as needed for allergies ) 16 g 5     fluticasone (FLOVENT HFA) 220 MCG/ACT inhaler Inhale 2 puffs into the lungs 2 times daily 3 Inhaler 3     insulin pen needle (B-D U/F) 31G X 8 MM Use 4 times daily (with Lantus and Novolog) or as directed. 400 each 3     LANTUS SOLOSTAR 100 UNIT/ML soln INJECT 25 UNTIS SUBCUTANEOUSLY EVERY DAY 15 mL 0     metFORMIN (GLUCOPHAGE-XR) 500 MG 24 hr tablet TAKE 2 TABLETS BY MOUTH DAILY WITH DINNER 180 tablet 0     metoprolol succinate (TOPROL-XL) 100 MG 24 hr tablet Take 50 mg by mouth in combination with 12.5 for a total of 62.5 twice a day 90 tablet 3     potassium chloride SA (K-DUR/KLOR-CON M) 20 MEQ CR tablet Increase KCL to 40 MEQ in AM and 20 MEQ in the evening. 180 tablet 3     predniSONE (DELTASONE) 1 MG tablet Use with 5 mg tabs to taper: 10 mg and 9 mg every other day for 1m, 9 mg/day for 1m, 9 mg and 8 mg every other day for 1m, etc. until at 7mg 120 tablet 6     predniSONE (DELTASONE) 10 MG tablet Take 2 tablets for three days (4/12-14), then take 1 tablet daily (10 mg daily starting 4/15) 90 tablet 3     predniSONE (DELTASONE) 5 MG tablet Use with 1 mg tabs to taper: 10 mg and 9 mg every other day for 1m, 9 mg/day for 1m, 9 mg and 8 mg every other day for 1m, etc. until at 7mg 30 tablet 6     spironolactone (ALDACTONE) 25 MG tablet Take 2 tablets (50 mg) by mouth daily 180 tablet 3     torsemide (DEMADEX) 20 MG tablet Take 40 mg in the morning, 30 mg in the afternoon. 105 tablet 3     traZODone (DESYREL) 50 MG tablet TAKE 1/2-1 TABLET BY MOUTH NIGHTLY AS NEEDED, CAN TITRATE DOWN TO 1/2TAB OR UP TO 2TABS AS NEEDED 180 tablet 0     warfarin (COUMADIN) 7.5 MG tablet TAKE 1 TABLET BY MOUTH DAILY. CURRENT DOSE IS 7.5 MG DAILY. DOSE ADJUSTED PER INR RESULT. 90 tablet 3     acetaminophen (TYLENOL) 500 MG tablet Take 500 mg by mouth nightly as needed        acyclovir (ZOVIRAX) 400 MG tablet Take 1 tablet  (400 mg) by mouth 3 times daily For a couple days 15 tablet 2     acyclovir (ZOVIRAX) 400 MG tablet Take 400 mg by mouth See Admin Instructions 5 times daily as needed for outbreaks       cyclobenzaprine (FLEXERIL) 10 MG tablet Take 1 tablet (10 mg) by mouth nightly as needed for muscle spasms 14 tablet 1     ketoconazole (NIZORAL) 2 % cream Apply topically 2 times daily 60 g 1     lidocaine (LIDODERM) 5 % Patch Apply patch to painful area for up to 12 h within a 24 h period.  Remove after 12 hours. 30 patch 0     lisinopril (PRINIVIL/ZESTRIL) 2.5 MG tablet TAKE 1 TABLET (2.5 MG) BY MOUTH DAILY 90 tablet 0     metoprolol succinate (TOPROL XL) 25 MG 24 hr tablet Take 0.5 tablets (12.5 mg) by mouth 2 times daily Take 50 mg by mouth in combination with 12.5 for a total of 62.5 twice a day 30 tablet 3     montelukast (SINGULAIR) 10 MG tablet TAKE 1 TABLET BY MOUTH AT BEDTIME 90 tablet 0     NOVOLOG FLEXPEN 100 UNIT/ML soln INJECT 12 UNITS SUBCUTANEOUS 3 TIMES DAILY (WITH MEALS) 15 mL 0     order for DME Equipment being ordered: Full face mask for CPAP - size: F10 large 1 each 0     order for DME Oxygen: Patient requires supplemental Oxygen 4 LPM via nasal canula with activity. Please provide patient with a home unit and with portability capability. Oxygen will be for a lifetime. 1 Device 0     Allergies   Allergen Reactions     Augmentin Nausea and Vomiting     Codeine Nausea and Vomiting     Phenobarbital Itching     Seasonal Allergies      Recent Labs   Lab Test  05/23/18   1028  04/18/18   1438   02/21/18   1230   01/31/18   1428  11/08/17   1432   10/06/17   1421   08/01/17   1146   06/22/17   1700   A1C  6.9*   --    --    --    --   7.0*   --    --   7.3*   --    --    < >   --    LDL  21   --    --   5   --    --    --    --    --    --    --    --   13   HDL  53   --    --   64   --    --    --    --    --    --    --    --   62   TRIG  113   --    --   188*   --    --    --    --    --    --    --    --   132  "  ALT  21   --    --    --    --    --    --    --   43   --   45   --    --    CR  0.90  0.90   < >  0.88   < >  0.96   --    < >  0.94   < >  0.90   < >  0.80   GFRESTIMATED  61  60*   < >  62   < >  56*   --    < >  58*   < >  61   < >  69   GFRESTBLACK  74  73   < >  76   < >  68   --    < >  70   < >  74   < >  84   POTASSIUM  3.5  4.3   < >  3.9   < >  4.5   --    < >  4.3   < >  3.5   < >  3.0*   TSH  2.42   --    --    --    --    --   1.40   --    --    --    --    --    --     < > = values in this interval not displayed.      BP Readings from Last 3 Encounters:   07/25/18 105/73   07/25/18 105/73   07/12/18 107/66    Wt Readings from Last 3 Encounters:   07/25/18 212 lb (96.2 kg)   07/25/18 212 lb (96.2 kg)   07/12/18 212 lb (96.2 kg)           Labs reviewed in EPIC    Reviewed and updated as needed this visit by clinical staff  Tobacco  Allergies  Meds  Problems       Reviewed and updated as needed this visit by Provider  Allergies  Meds  Problems         ROS:  Constitutional, HEENT, cardiovascular, pulmonary, gi and gu systems are negative, except as otherwise noted.    OBJECTIVE:     /73  Pulse 87  Temp 98.1  F (36.7  C) (Oral)  Ht 5' 7\" (1.702 m)  Wt 210 lb (95.3 kg)  SpO2 97%  Breastfeeding? No  BMI 32.89 kg/m2  Body mass index is 32.89 kg/(m^2).  GENERAL APPEARANCE: healthy, alert and no distress     EYES: PERRL, sclera clear     HENT: nose and mouth without ulcers or lesions     NECK: no adenopathy, no asymmetry, masses, or scars and thyroid normal to palpation     RESP: lungs clear to auscultation - no rales, rhonchi or wheezes     CV: regular rates and rhythm, normal S1 S2, no S3 or S4 and no murmur, click or rub      Abdomen: soft, nontender, no HSM or masses and bowel sounds normal     MS: extremities warm, dry, no contusions or skin abrasions, moving slowly but steadily to exam table, knees with full ROM and strength, normal DTRs and sensation, no laxity of knee tendons on " exam.  Low back normal to inspection, normal lumbar ROM.  Negative straight leg raise.     Psych: full but irritable affect, normal speech and grooming, judgement and insight intact       ASSESSMENT/PLAN:       ICD-10-CM    1. Major depressive disorder, recurrent episode, moderate F33.1 escitalopram (LEXAPRO) 20 MG tablet   2. Nocturia R35.1    3. (HFpEF) heart failure with preserved ejection fraction (H) I50.30    4. Acute on chronic diastolic heart failure (H) I50.33    5. Acute and chronic respiratory failure with hypoxia (H) J96.21    6. Fall, initial encounter W19.XXXA    7. Type 2 diabetes mellitus with hyperglycemia, with long-term current use of insulin (H) E11.65     Z79.4      MDD- worsening sx's, more irritable on lexapro 10mg/d. Thinks she's doing worse than she had been doing on the paxil, close friends agree.  Will increase to 20mg/d and see if that helps sx's.  RTC in 6 wks for f/u.  Risks and benefits of medication(s) including potential side effects reviewed with patient.  Questions answered.     Medications-   Nocturia x 5 times with diuretic use.  Did better the evening she forgot her meds.  Takes pm dose just before bed.  Will have her take the lipitor and torsemide in the afternoon, which hopefully will help the nocturia, and split up the timing of the coumadin and torsemide.    A.fib/INR- INR a bit low again today- will increase dosing slightly and recheck in a week.    SOB- has pulmonary appt next week, will discuss portable O2.      Fall recent- knee suddenly 'gave out', no issues with it before or since, no knee pain.  Reports no head injury.  Does have some low back pain, but that seems to be getting a bit better with time.    DM/Lipids/BP- DM in good control, A1c 6.9 in 5/18, with normal lipids (on lipitor).  BP on lower end of normal- on lasix, spironolactone and toprol XL, so more for heart failure and a.fib issues than HTN.  Pt denies orthostatic sx's with her recent fall.    RTC 6 wks,  try and make 45 min appts.      Ilene Tristan MD  [facility]

## 2018-07-06 NOTE — TELEPHONE ENCOUNTER
ANTICOAGULATION FOLLOW-UP CLINIC VISIT    Patient Name:  Btety Tee  Date:  7/6/2018  Contact Type:  Face to Face    SUBJECTIVE:  Bleeding Signs/Symptoms: None  Thromboembolic Signs/Symptoms: None    Medication Changes:  No  Dietary Changes:  No  Bacterial/Viral Infection:  No    Missed Coumadin Doses:  None  Other Concerns:  No      ASSESSMENT/PLAN:  See: ANTICOAGULATION QIC flow sheet.    INR 1.6  Pt confirmed she increased dose last week as directed  Reviewed diet, meds, supplements, etc - no changed per pt and she's feeling ok  Advised 11.25mg FriMon and 7.5mg ROW (60mg/wk - 6% increase)  Pt informed - informed CW of result too as pt seeing PCP now  Lydia HALEY RN    Dosage adjustment made based on physician directed care plan.    JIMI VOGT

## 2018-07-06 NOTE — MR AVS SNAPSHOT
After Visit Summary   7/6/2018    Betty Tee    MRN: [id number]           Patient Information     Date Of Birth          1940        Visit Information        Provider Department      7/6/2018 10:30 AM Ilene Tristan MD [facility]        Today's Diagnoses     Major depressive disorder, recurrent episode, moderate    -  1    Nocturia        (HFpEF) heart failure with preserved ejection fraction (H)        Acute on chronic diastolic heart failure (H)        Acute and chronic respiratory failure with hypoxia (H)        Fall, initial encounter        Type 2 diabetes mellitus with hyperglycemia, with long-term current use of insulin (H)           Follow-ups after your visit        Your next 10 appointments already scheduled     Aug 17, 2018  1:00 PM CDT   Office Visit with Ilene Tristan MD   [facility] ([facility])    [address] [place] [address] [id number] [phone]           Bring a current list of meds and any records pertaining to this visit. For Physicals, please bring immunization records and any forms needing to be filled out. Please arrive 10 minutes early to complete paperwork.            Oct 17, 2018 12:00 PM CDT   Lab with SHAYY LAB   [facility] ([facility])    [address]  1st Floor  [place] [facility] [id number] [phone]            Oct 17, 2018 12:30 PM CDT   (Arrive by 12:15 PM)   CORE RETURN with LIZY Tyler CNP   [facility] ([facility])    [address]  [address]  [place] [facility] [address] [phone]            Nov 01, 2018  8:00 AM CDT   FULL PULMONARY FUNCTION with UC PFL B   [facility] Pulmonary Function Testing ([facility])    [address]  3rd Floor  [place] [facility] [address] [phone]            Nov 01, 2018  9:00 AM CDT   (Arrive by 8:45 AM)   Return Interstitial Lung with Maryjane  "Jean Carlos Tillman MD   [facility] ([place] [facility]    [address]  [address]  [place] [address] [phone]            Dec 06, 2018  3:00 PM CST   (Arrive by 2:45 PM)   Return Visit with Carmenza Stringer MD   [facility] ([place] [facility])    [address]  [address]  [place] [address] [phone]              Who to contact     If you have questions or need follow up information about today's clinic visit or your schedule please contact [facility] directly at [phone].  Normal or non-critical lab and imaging results will be communicated to you by [employer], letter or phone within 4 business days after the clinic has received the results. If you do not hear from us within 7 days, please contact the clinic through [employer] or phone. If you have a critical or abnormal lab result, we will notify you by phone as soon as possible.  Submit refill requests through [employer] or call your pharmacy and they will forward the refill request to us. Please allow 3 business days for your refill to be completed.          Additional Information About Your Visit        [employer] Information     [employer] gives you secure access to your electronic health record. If you see a primary care provider, you can also send messages to your care team and make appointments. If you have questions, please call your primary care clinic.  If you do not have a primary care provider, please call [phone] and they will assist you.        Care EveryWhere ID     This is your Care EveryWhere ID. This could be used by other organizations to access your [place] medical records  [id number]        Your Vitals Were     Pulse Temperature Height Pulse Oximetry Breastfeeding? BMI (Body Mass Index)    87 98.1  F (36.7  C) (Oral) 5' 7\" (1.702 m) 97% No 32.89 kg/m2       Blood Pressure from Last 3 Encounters:   07/25/18 105/73 "   07/25/18 105/73   07/12/18 107/66    Weight from Last 3 Encounters:   07/25/18 212 lb (96.2 kg)   07/25/18 212 lb (96.2 kg)   07/12/18 212 lb (96.2 kg)              Today, you had the following     No orders found for display         Today's Medication Changes          These changes are accurate as of 7/6/18 11:59 PM.  If you have any questions, ask your nurse or doctor.               These medicines have changed or have updated prescriptions.        Dose/Directions    acyclovir 5 % ointment   Commonly known as:  ZOVIRAX   This may have changed:  additional instructions   Used for:  Recurrent cold sores        Apply topically 6 times daily   Quantity:  15 g   Refills:  3       escitalopram 20 MG tablet   Commonly known as:  LEXAPRO   This may have changed:    - medication strength  - See the new instructions.   Used for:  Major depressive disorder, recurrent episode, moderate (H)   Changed by:  Ilene Tristan MD        TAKE 1 TABLET (20 MG) BY MOUTH DAILY   Quantity:  30 tablet   Refills:  1       fluticasone 50 MCG/ACT spray   Commonly known as:  FLONASE   This may have changed:    - when to take this  - reasons to take this   Used for:  Seasonal allergic rhinitis        Dose:  1-2 spray   Spray 1-2 sprays into both nostrils daily   Quantity:  16 g   Refills:  5            Where to get your medicines      These medications were sent to [facility]/pharmacy #[id number] [address] [address]     Phone:  [phone]     escitalopram 20 MG tablet    NovoLOG FLEXPEN 100 UNIT/ML injection                Primary Care Provider Office Phone # Fax #    Ilene Tristan [phone] [address] [address]        Equal Access to Services     GENNY STONER AH: Gilda Trammell, rogelio perez, yaneth tinajero, anderson shaikh. So [facility]  [phone].    ATENCIÓN: Si jewel covarrubias, tiene a conti disposición servicios gratuitos de asistencia lingüística. Noel carpenter [phone].    We comply with applicable federal civil rights laws and [place] laws. We do not discriminate on the basis of race, color, national origin, age, disability, sex, sexual orientation, or gender identity.            Thank you!     Thank you for choosing [facility]  for your care. Our goal is always to provide you with excellent care. Hearing back from our patients is one way we can continue to improve our services. Please take a few minutes to complete the written survey that you may receive in the mail after your visit with us. Thank you!             Your Updated Medication List - Protect others around you: Learn how to safely use, store and throw away your medicines at www.disposemymeds.org.          This list is accurate as of 7/6/18 11:59 PM.  Always use your most recent med list.                   Brand Name Dispense Instructions for use Diagnosis    * acyclovir 400 MG tablet    ZOVIRAX     Take 400 mg by mouth See Admin Instructions 5 times daily as needed for outbreaks        * acyclovir 400 MG tablet    ZOVIRAX    15 tablet    Take 1 tablet (400 mg) by mouth 3 times daily For a couple days    Herpes labialis       acyclovir 5 % ointment    ZOVIRAX    15 g    Apply topically 6 times daily    Recurrent cold sores       albuterol 108 (90 Base) MCG/ACT Inhaler    PROAIR HFA/PROVENTIL HFA/VENTOLIN HFA    1 Inhaler    Inhale 2 puffs into the lungs every 6 hours    ILD (interstitial lung disease) (H)       alendronate 70 MG tablet    FOSAMAX    4 tablet    Take 1 tablet (70 mg) by mouth every 7 days    Other osteoporosis without current pathological fracture       atorvastatin 10 MG tablet    LIPITOR    90 tablet    Take 1 tablet (10 mg) by mouth daily    Hyperlipidemia LDL goal <100       * blood glucose monitoring lancets     4 Box    Use to test blood sugar 4  times daily or as directed.    Type 2 diabetes mellitus without complication, without long-term current use of insulin (H)       * blood glucose monitoring lancets     1 Box    Use to test blood sugar 4 times daily or as directed.  Ok to substitute alternative if insurance prefers.    Type 2 diabetes mellitus without complication, without long-term current use of insulin (H)       * blood glucose monitoring test strip    ACCU-CHEK SHANNON PLUS    120 strip    Use to test blood sugar 4 times daily or as directed.  Ok to substitute alternative if insurance prefers.    Type 2 diabetes mellitus without complication, without long-term current use of insulin (H)       * ACCU-CHEK SHANNON PLUS test strip   Generic drug:  blood glucose monitoring     400 strip    USE TO TEST BLOOD SUGARS 4 TIMES DAILY OR AS DIRECTED    Type 2 diabetes mellitus without complication, without long-term current use of insulin (H)       COMPRESSION STOCKINGS     2 each    Wear compression stockings in affected leg (right leg) or both legs most of the time during the day and take them off at night.    DVT (deep venous thrombosis), right, Postphlebitic syndrome, Chronic anticoagulation, Atrial fibrillation (H)       escitalopram 20 MG tablet    LEXAPRO    30 tablet    TAKE 1 TABLET (20 MG) BY MOUTH DAILY    Major depressive disorder, recurrent episode, moderate (H)       fluticasone 220 MCG/ACT Inhaler    FLOVENT HFA    3 Inhaler    Inhale 2 puffs into the lungs 2 times daily    ILD (interstitial lung disease) (H), Follicular bronchiolitis (H)       fluticasone 50 MCG/ACT spray    FLONASE    16 g    Spray 1-2 sprays into both nostrils daily    Seasonal allergic rhinitis       * insulin pen needle 31G X 8 MM    B-D U/F    400 each    Use 4 times daily (with Lantus and Novolog) or as directed.    Type 2 diabetes mellitus without complication, without long-term current use of insulin (H)       * BD JANE U/F 32G X 4 MM   Generic drug:  insulin pen needle      400 each    USE 4 DAILY AS DIRECTED.    Type 2 diabetes mellitus without complication, without long-term current use of insulin (H)       ketoconazole 2 % cream    NIZORAL    60 g    Apply topically 2 times daily    Cutaneous candidiasis       LANTUS SOLOSTAR 100 UNIT/ML injection   Generic drug:  insulin glargine     15 mL    INJECT 25 UNTIS SUBCUTANEOUSLY EVERY DAY    Type 2 diabetes mellitus with hyperglycemia, with long-term current use of insulin (H)       metFORMIN 500 MG 24 hr tablet    GLUCOPHAGE-XR    180 tablet    TAKE 2 TABLETS BY MOUTH DAILY WITH DINNER    Type 2 diabetes mellitus without complication, without long-term current use of insulin (H)       metoprolol succinate 100 MG 24 hr tablet    TOPROL-XL    90 tablet    Take 50 mg by mouth in combination with 12.5 for a total of 62.5 twice a day    Atrial fibrillation with controlled ventricular response (H)       NovoLOG FLEXPEN 100 UNIT/ML injection   Generic drug:  insulin aspart     15 mL    INJECT 12 UNITS SUBCUTANEOUS 3 TIMES DAILY (WITH MEALS)    Type 2 diabetes mellitus with other specified complication (H)       * order for DME     1 Device    Oxygen: Patient requires supplemental Oxygen 4 LPM via nasal canula with activity. Please provide patient with a home unit and with portability capability. Oxygen will be for a lifetime.    Hypoxia, ILD (interstitial lung disease) (H)       * order for DME     1 each    Equipment being ordered: Full face mask for CPAP - size: F10 large    ANGELICA (obstructive sleep apnea)       potassium chloride SA 20 MEQ CR tablet    K-DUR/KLOR-CON M    180 tablet    Increase KCL to 40 MEQ in AM and 20 MEQ in the evening.    (HFpEF) heart failure with preserved ejection fraction (H)       * predniSONE 10 MG tablet    DELTASONE    90 tablet    Take 2 tablets for three days (4/12-14), then take 1 tablet daily (10 mg daily starting 4/15)    ILD (interstitial lung disease) (H), Sjogren's syndrome with lung involvement (H)        * predniSONE 1 MG tablet    DELTASONE    120 tablet    Use with 5 mg tabs to taper: 10 mg and 9 mg every other day for 1m, 9 mg/day for 1m, 9 mg and 8 mg every other day for 1m, etc. until at 7mg    Primary Sjogren's syndrome (H)       * predniSONE 5 MG tablet    DELTASONE    30 tablet    Use with 1 mg tabs to taper: 10 mg and 9 mg every other day for 1m, 9 mg/day for 1m, 9 mg and 8 mg every other day for 1m, etc. until at 7mg    Primary Sjogren's syndrome (H)       spironolactone 25 MG tablet    ALDACTONE    180 tablet    Take 2 tablets (50 mg) by mouth daily    Chronic diastolic congestive heart failure (H)       torsemide 20 MG tablet    DEMADEX    105 tablet    Take 40 mg in the morning, 30 mg in the afternoon.    (HFpEF) heart failure with preserved ejection fraction (H)       traZODone 50 MG tablet    DESYREL    180 tablet    TAKE 1/2-1 TABLET BY MOUTH NIGHTLY AS NEEDED, CAN TITRATE DOWN TO 1/2TAB OR UP TO 2TABS AS NEEDED    Insomnia due to medical condition       TYLENOL 500 MG tablet   Generic drug:  acetaminophen      Take 500 mg by mouth nightly as needed    Dizziness       warfarin 7.5 MG tablet    COUMADIN    90 tablet    TAKE 1 TABLET BY MOUTH DAILY. CURRENT DOSE IS 7.5 MG DAILY. DOSE ADJUSTED PER INR RESULT.    Atrial fibrillation with controlled ventricular response (H)       * Notice:  This list has 13 medication(s) that are the same as other medications prescribed for you. Read the directions carefully, and ask your doctor or other care provider to review them with you.

## 2018-07-07 ASSESSMENT — ANXIETY QUESTIONNAIRES: GAD7 TOTAL SCORE: 2

## 2018-07-07 ASSESSMENT — PATIENT HEALTH QUESTIONNAIRE - PHQ9: SUM OF ALL RESPONSES TO PHQ QUESTIONS 1-9: 8

## 2018-07-08 DIAGNOSIS — D50.0 IRON DEFICIENCY ANEMIA DUE TO CHRONIC BLOOD LOSS: ICD-10-CM

## 2018-07-09 DIAGNOSIS — I50.30 (HFPEF) HEART FAILURE WITH PRESERVED EJECTION FRACTION (H): ICD-10-CM

## 2018-07-09 DIAGNOSIS — I48.19 PERSISTENT ATRIAL FIBRILLATION (H): ICD-10-CM

## 2018-07-09 RX ORDER — INSULIN ASPART 100 [IU]/ML
INJECTION, SOLUTION INTRAVENOUS; SUBCUTANEOUS
Qty: 15 ML | Refills: 0 | Status: SHIPPED | OUTPATIENT
Start: 2018-07-09 | End: 2018-08-09

## 2018-07-09 NOTE — TELEPHONE ENCOUNTER
"Prescription approved per [facility].  Sophia Hemphill RN  Requested Prescriptions   Signed Prescriptions Disp Refills     NOVOLOG FLEXPEN 100 UNIT/ML soln 15 mL 0     Sig: INJECT 12 UNITS SUBCUTANEOUS 3 TIMES DAILY (WITH MEALS)    Short Acting Insulin Protocol Passed    7/6/2018  4:42 PM       Passed - Blood pressure less than 140/90 in past 6 months    BP Readings from Last 3 Encounters:   07/06/18 114/73   06/15/18 99/68   06/07/18 117/79                Passed - LDL on file in past 12 months    Recent Labs   Lab Test  05/23/18   1028   LDL  21            Passed - Microalbumin on file in past 12 months    Recent Labs   Lab Test  10/06/17   1422   MICROL  14   UMALCR  29.65*            Passed - Serum creatinine on file in past 12 months    Recent Labs   Lab Test  05/23/18   1028   CR  0.90            Passed - HgbA1C in past 3 or 6 months    If HgbA1C is 8 or greater, it needs to be on file within the past 3 months.  If less than 8, must be on file within the past 6 months.     Recent Labs   Lab Test  05/23/18   1028   A1C  6.9*            Passed - Patient is age 18 or older       Passed - Recent (6 mo) or future (30 days) visit within the authorizing provider's specialty    Patient had office visit in the last 6 months or has a visit in the next 30 days with authorizing provider or within the authorizing provider's specialty.  See \"Patient Info\" tab in inbasket, or \"Choose Columns\" in Meds & Orders section of the refill encounter.              "

## 2018-07-10 RX ORDER — METOPROLOL SUCCINATE 25 MG/1
12.5 TABLET, EXTENDED RELEASE ORAL 2 TIMES DAILY
Qty: 30 TABLET | Refills: 3 | Status: SHIPPED | OUTPATIENT
Start: 2018-07-10 | End: 2018-10-22

## 2018-07-10 NOTE — TELEPHONE ENCOUNTER
"CW  Routing refill request to provider for review/approval because:  Drug not active on patient's medication list  Please approve if appropriate  Kerline Leo RN        Requested Prescriptions   Pending Prescriptions Disp Refills     ferrous sulfate (IRON) 325 (65 Fe) MG tablet [Pharmacy Med Name: FERROUS SULFATE 325 MG TABLET] 30 tablet 3     Sig: TAKE 1 TABLET (325 MG) BY MOUTH DAILY (WITH BREAKFAST)    Iron Supplements Passed    7/8/2018  6:10 PM       Passed - Patient is 12 years of age or older       Passed - Recent (12 mo) or future (30 days) visit within the authorizing provider's specialty    Patient had office visit in the last 12 months or has a visit in the next 30 days with authorizing provider or within the authorizing provider's specialty.  See \"Patient Info\" tab in inbasket, or \"Choose Columns\" in Meds & Orders section of the refill encounter.           Passed - Hgb OR Hct on record within the past 12 mos.    Patient need only have had a HGB or HCT on file in the past 12 mos. That result does not need to be normal.    Recent Labs   Lab Test  11/08/17   1432  10/06/17   1421  09/12/17   1717   HGB  13.4  13.2  13.1     Recent Labs   Lab Test  11/08/17   1432  10/06/17   1421  09/12/17   1717   HCT  42.1  41.0  41.4       Please verify a HGB or HCT has been checked SINCE THE LAST DOSE CHANGE.                "

## 2018-07-11 ENCOUNTER — TELEPHONE (OUTPATIENT)
Dept: FAMILY MEDICINE | Facility: CLINIC | Age: 78
End: 2018-07-11

## 2018-07-11 DIAGNOSIS — M53.3 SACRAL BACK PAIN: Primary | ICD-10-CM

## 2018-07-11 NOTE — TELEPHONE ENCOUNTER
It looks like it was stopped prior to 10/17, and hgb's have been stable since then, so believe she is not taking iron.   Thanks- CW

## 2018-07-11 NOTE — TELEPHONE ENCOUNTER
Sent [employer] message to pt to confirm hasn't been on iron  Lydia HALEY RN

## 2018-07-11 NOTE — TELEPHONE ENCOUNTER
CW,   Patient is wanting xray of (L) buttock she said   Said she isn't sure it will be beneficial anyway but would like one  Seen 7/6/2018 states she discussed pain with her at that appt from fall  OV notes incomplete at this time - see mention of fall but no xray order in place  Patient is currently at the [facility] - would like to get xray now.  Huddled with LS - needs to wait for you to address  Pt informed - told her will get back to her Friday when you return  Lydia HALEY RN

## 2018-07-11 NOTE — TELEPHONE ENCOUNTER
Reason for Call: Request for an order or referral:    Order or referral being requested: X-Ray    Date needed: as soon as possible    (she was not happy about having to leave a message, told her  is not in the  Clinic today so the Nurse will need to see if another Provider can take care of this and she   Asked me what my name is, so I told her and then she kept saying whoa, whoa)    Has the patient been seen by the PCP for this problem? YES    Additional comments: was seen on Friday    Phone number Patient can be reached at:  Cell number on file:    Telephone Information:   Mobile [phone]       Best Time:  anytime    Can we leave a detailed message on this number?  YES    Call taken on 7/11/2018 at 12:58 PM by Carmelo Cota

## 2018-07-12 ENCOUNTER — APPOINTMENT (OUTPATIENT)
Dept: GENERAL RADIOLOGY | Facility: CLINIC | Age: 78
End: 2018-07-12
Attending: PHYSICIAN ASSISTANT
Payer: MEDICARE

## 2018-07-12 ENCOUNTER — HOSPITAL ENCOUNTER (EMERGENCY)
Facility: CLINIC | Age: 78
Discharge: HOME | End: 2018-07-12
Attending: EMERGENCY MEDICINE | Admitting: EMERGENCY MEDICINE
Payer: MEDICARE

## 2018-07-12 ENCOUNTER — TELEPHONE (OUTPATIENT)
Dept: FAMILY MEDICINE | Facility: CLINIC | Age: 78
End: 2018-07-12

## 2018-07-12 ENCOUNTER — MYC MEDICAL ADVICE (OUTPATIENT)
Dept: FAMILY MEDICINE | Facility: CLINIC | Age: 78
End: 2018-07-12

## 2018-07-12 VITALS
WEIGHT: 212 LBS | HEIGHT: 67 IN | SYSTOLIC BLOOD PRESSURE: 107 MMHG | DIASTOLIC BLOOD PRESSURE: 66 MMHG | BODY MASS INDEX: 33.27 KG/M2 | RESPIRATION RATE: 16 BRPM | OXYGEN SATURATION: 98 % | TEMPERATURE: 98.2 F
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
## 2018-07-12 DIAGNOSIS — M79.18 LEFT BUTTOCK PAIN: ICD-10-CM

## 2018-07-12 DIAGNOSIS — M54.42 ACUTE MIDLINE LOW BACK PAIN WITH LEFT-SIDED SCIATICA: ICD-10-CM

## 2018-07-12 LAB — INR PPP: 2.72 (ref 0.86–1.14)

## 2018-07-12 PROCEDURE — 72170 X-RAY EXAM OF PELVIS: CPT

## 2018-07-12 PROCEDURE — 72100 X-RAY EXAM L-S SPINE 2/3 VWS: CPT

## 2018-07-12 PROCEDURE — 99285 EMERGENCY DEPT VISIT HI MDM: CPT

## 2018-07-12 PROCEDURE — 25000132 ZZH RX MED GY IP 250 OP 250 PS 637: Mod: GY | Performed by: PHYSICIAN ASSISTANT

## 2018-07-12 PROCEDURE — 85610 PROTHROMBIN TIME: CPT | Performed by: PHYSICIAN ASSISTANT

## 2018-07-12 RX ORDER — LIDOCAINE 4 G/G
1 PATCH TOPICAL ONCE
Status: DISCONTINUED | OUTPATIENT
Start: 2018-07-12 | End: 2018-07-12 | Stop reason: HOSPADM

## 2018-07-12 RX ORDER — LIDOCAINE 50 MG/G
PATCH TOPICAL
Qty: 30 PATCH | Refills: 0 | Status: SHIPPED | OUTPATIENT
Start: 2018-07-12 | End: 2019-02-26

## 2018-07-12 RX ORDER — FERROUS SULFATE 325(65) MG
TABLET ORAL
Start: 2018-07-12

## 2018-07-12 RX ADMIN — LIDOCAINE 1 PATCH: 560 PATCH PERCUTANEOUS; TOPICAL; TRANSDERMAL at 20:10

## 2018-07-12 ASSESSMENT — ENCOUNTER SYMPTOMS
BACK PAIN: 1
MYALGIAS: 1

## 2018-07-12 NOTE — ED AVS SNAPSHOT
Emergency Department    [address] [facility] [address]    Phone:  [phone]    Fax:  [phone]                                       Betty Tee   MRN: [id number]    Department:   Emergency Department   Date of Visit:  7/12/2018           Patient Information     Date Of Birth          1940        Your diagnoses for this visit were:     Fall, initial encounter     Left buttock pain     Acute midline low back pain with left-sided sciatica        You were seen by Juan Diego Castillo DO.      Follow-up Information     Follow up with Ilene Tristan MD In 5 days.    Specialty:  Family Practice    Why:  Recheck    Contact information:    [address]  [facility] [address] [phone]          Follow up with  Emergency Department.    Specialty:  EMERGENCY MEDICINE    Why:  If symptoms worsen    Contact information:    [address] [facility] [address] [phone]        Discharge Instructions       Discharge Instructions  Back Pain  You were seen today for back pain. Back pain can have many causes, but most will get better without surgery or other specific treatment. Sometimes there is a herniated ( slipped ) disc. We do not usually do MRI scans to look for these right away, since most herniated discs will get better on their own with time.  Today, we did not find any evidence that your back pain was caused by a serious condition. However, sometimes symptoms develop over time and cannot be found during an emergency visit, so it is very important that you follow up with your primary provider.  Generally, every Emergency Department visit should have a follow-up clinic visit with either a primary or a specialty clinic/provider. Please follow-up as instructed by your emergency provider today.    Return to the Emergency Department if:    You develop a fever with your back pain.     You have weakness or change in sensation in one or both  legs.    You lose control of your bowels or bladder, or cannot empty your bladder (cannot pee).    Your pain gets much worse.     Follow-up with your provider:    Unless your pain has completely gone away, please make an appointment with your provider within one week. Most of the routine care for back pain is available in a clinic and not the Emergency Department. You may need further management of your back pain, such as more pain medication, imaging such as an X-ray or MRI, or physical therapy.    What can I do to help myself?    Remain Active -- People are often afraid that they will hurt their back further or delay recovery by remaining active, but this is one of the best things you can do for your back. In fact, staying in bed for a long time to rest is not recommended. Studies have shown that people with low back pain recover faster when they remain active. Movement helps to bring blood flow to the muscles and relieve muscle spasms as well as preventing loss of muscle strength.    Heat -- Using a heating pad can help with low back pain during the first few weeks. Do not sleep with a heating pad, as you can be burned.     Pain medications - You may take a pain medication such as Tylenol  (acetaminophen), Advil , Motrin  (ibuprofen) or Aleve  (naproxen).  If you were given a prescription for medicine here today, be sure to read all of the information (including the package insert) that comes with your prescription.  This will include important information about the medicine, its side effects, and any warnings that you need to know about.  The pharmacist who fills the prescription can provide more information and answer questions you may have about the medicine.  If you have questions or concerns that the pharmacist cannot address, please call or return to the Emergency Department.   Remember that you can always come back to the Emergency Department if you are not able to see your regular provider in the amount  of time listed above, if you get any new symptoms, or if there is anything that worries you.      Your next 10 appointments already scheduled     Jul 25, 2018  8:00 AM CDT   SIX MINUTE WALK with  PFL C,  PFL 6 MINUTE WALK 1   [facility] ([facility])    [address] [place] [address]  3rd Floor  [facility] [address]   [phone]            Jul 25, 2018  9:30 AM CDT   (Arrive by 9:15 AM)   Return Interstitial Lung with Meño Walsh MD   [facility] ([facility])    [address] [place] [address]  [address]  [facility] [address]   [phone]            Aug 17, 2018  1:00 PM CDT   Office Visit with Ilene Tristan MD   [facility] ([facility])    [address] [facility] [id number] [phone]           Bring a current list of meds and any records pertaining to this visit. For Physicals, please bring immunization records and any forms needing to be filled out. Please arrive 10 minutes early to complete paperwork.            Oct 17, 2018 12:00 PM CDT   Lab with  LAB   [facility] ([facility])    [address] [place] [address]  1st Floor  [facility] [address]   [phone]            Oct 17, 2018 12:30 PM CDT   (Arrive by 12:15 PM)   CORE RETURN with LIZY Tyler CNP   [facility] ([facility])    [address] [place] [address]  [address]  [facility] [address] [phone]            Dec 06, 2018  3:00 PM CST   (Arrive by 2:45 PM)   Return Visit with Carmenza Stringer MD   [facility] ([facility])    [address] [place] [address]  [address]  [facility] [id number] [phone]              24 Hour Appointment Hotline       To make an appointment at any [facility], call [phone] ([phone]). If you don't have a family doctor or clinic, we will help you  find one. [facility] are conveniently located to serve the needs of you and your family.             Review of your medicines      Our records show that you are taking the medicines listed below. If these are incorrect, please call your family doctor or clinic.        Dose / Directions Last dose taken    * acyclovir 400 MG tablet   Commonly known as:  ZOVIRAX   Dose:  400 mg        Take 400 mg by mouth See Admin Instructions 5 times daily as needed for outbreaks   Refills:  0        * acyclovir 400 MG tablet   Commonly known as:  ZOVIRAX   Dose:  400 mg   Quantity:  15 tablet        Take 1 tablet (400 mg) by mouth 3 times daily For a couple days   Refills:  2        acyclovir 5 % ointment   Commonly known as:  ZOVIRAX   Quantity:  15 g        Apply topically 6 times daily   Refills:  3        albuterol 108 (90 Base) MCG/ACT Inhaler   Commonly known as:  PROAIR HFA/PROVENTIL HFA/VENTOLIN HFA   Dose:  2 puff   Quantity:  1 Inhaler        Inhale 2 puffs into the lungs every 6 hours   Refills:  3        alendronate 70 MG tablet   Commonly known as:  FOSAMAX   Dose:  70 mg   Quantity:  4 tablet        Take 1 tablet (70 mg) by mouth every 7 days   Refills:  11        atorvastatin 10 MG tablet   Commonly known as:  LIPITOR   Dose:  10 mg   Quantity:  90 tablet        Take 1 tablet (10 mg) by mouth daily   Refills:  0        * blood glucose monitoring lancets   Quantity:  4 Box        Use to test blood sugar 4 times daily or as directed.   Refills:  3        * blood glucose monitoring lancets   Quantity:  1 Box        Use to test blood sugar 4 times daily or as directed.  Ok to substitute alternative if insurance prefers.   Refills:  11        * blood glucose monitoring test strip   Commonly known as:  ACCU-CHEK SHANNON PLUS   Quantity:  120 strip        Use to test blood sugar 4 times daily or as directed.  Ok to substitute alternative if insurance prefers.   Refills:  11        * ACCU-CHEK SHANNON PLUS test strip    Quantity:  400 strip   Generic drug:  blood glucose monitoring        USE TO TEST BLOOD SUGARS 4 TIMES DAILY OR AS DIRECTED   Refills:  0        COMPRESSION STOCKINGS   Quantity:  2 each        Wear compression stockings in affected leg (right leg) or both legs most of the time during the day and take them off at night.   Refills:  2        escitalopram 20 MG tablet   Commonly known as:  LEXAPRO   Quantity:  30 tablet        TAKE 1 TABLET (20 MG) BY MOUTH DAILY   Refills:  1        fluticasone 220 MCG/ACT Inhaler   Commonly known as:  FLOVENT HFA   Dose:  2 puff   Quantity:  3 Inhaler        Inhale 2 puffs into the lungs 2 times daily   Refills:  3        fluticasone 50 MCG/ACT spray   Commonly known as:  FLONASE   Dose:  1-2 spray   Quantity:  16 g        Spray 1-2 sprays into both nostrils daily   Refills:  5        * insulin pen needle 31G X 8 MM   Commonly known as:  B-D U/F   Quantity:  400 each        Use 4 times daily (with Lantus and Novolog) or as directed.   Refills:  3        * BD JANE U/F 32G X 4 MM   Quantity:  400 each   Generic drug:  insulin pen needle        USE 4 DAILY AS DIRECTED.   Refills:  0        ketoconazole 2 % cream   Commonly known as:  NIZORAL   Quantity:  60 g        Apply topically 2 times daily   Refills:  1        LANTUS SOLOSTAR 100 UNIT/ML injection   Quantity:  15 mL   Generic drug:  insulin glargine        INJECT 25 UNTIS SUBCUTANEOUSLY EVERY DAY   Refills:  0        lidocaine 5 % Patch   Commonly known as:  LIDODERM   Quantity:  30 patch        Apply patch to painful area for up to 12 h within a 24 h period.  Remove after 12 hours.   Refills:  0        lisinopril 2.5 MG tablet   Commonly known as:  PRINIVIL/Zestril   Quantity:  90 tablet        TAKE 1 TABLET (2.5 MG) BY MOUTH DAILY   Refills:  0        metFORMIN 500 MG 24 hr tablet   Commonly known as:  GLUCOPHAGE-XR   Quantity:  180 tablet        TAKE 2 TABLETS BY MOUTH DAILY WITH DINNER   Refills:  0        * metoprolol  succinate 100 MG 24 hr tablet   Commonly known as:  TOPROL-XL   Quantity:  90 tablet        Take 50 mg by mouth in combination with 12.5 for a total of 62.5 twice a day   Refills:  3        * metoprolol succinate 25 MG 24 hr tablet   Commonly known as:  TOPROL XL   Dose:  12.5 mg   Quantity:  30 tablet        Take 0.5 tablets (12.5 mg) by mouth 2 times daily Take 50 mg by mouth in combination with 12.5 for a total of 62.5 twice a day   Refills:  3        montelukast 10 MG tablet   Commonly known as:  SINGULAIR   Quantity:  90 tablet        TAKE 1 TABLET BY MOUTH AT BEDTIME   Refills:  1        NovoLOG FLEXPEN 100 UNIT/ML injection   Quantity:  15 mL   Generic drug:  insulin aspart        INJECT 12 UNITS SUBCUTANEOUS 3 TIMES DAILY (WITH MEALS)   Refills:  0        * order for DME   Quantity:  1 Device        Oxygen: Patient requires supplemental Oxygen 4 LPM via nasal canula with activity. Please provide patient with a home unit and with portability capability. Oxygen will be for a lifetime.   Refills:  0        * order for DME   Quantity:  1 each        Equipment being ordered: Full face mask for CPAP - size: F10 large   Refills:  0        potassium chloride SA 20 MEQ CR tablet   Commonly known as:  K-DUR/KLOR-CON M   Quantity:  180 tablet        Increase KCL to 40 MEQ in AM and 20 MEQ in the evening.   Refills:  3        * predniSONE 10 MG tablet   Commonly known as:  DELTASONE   Quantity:  90 tablet        Take 2 tablets for three days (4/12-14), then take 1 tablet daily (10 mg daily starting 4/15)   Refills:  3        * predniSONE 1 MG tablet   Commonly known as:  DELTASONE   Quantity:  120 tablet        Use with 5 mg tabs to taper: 10 mg and 9 mg every other day for 1m, 9 mg/day for 1m, 9 mg and 8 mg every other day for 1m, etc. until at 7mg   Refills:  6        * predniSONE 5 MG tablet   Commonly known as:  DELTASONE   Quantity:  30 tablet        Use with 1 mg tabs to taper: 10 mg and 9 mg every other day for  1m, 9 mg/day for 1m, 9 mg and 8 mg every other day for 1m, etc. until at 7mg   Refills:  6        spironolactone 25 MG tablet   Commonly known as:  ALDACTONE   Dose:  50 mg   Quantity:  180 tablet        Take 2 tablets (50 mg) by mouth daily   Refills:  3        torsemide 20 MG tablet   Commonly known as:  DEMADEX   Quantity:  105 tablet        Take 40 mg in the morning, 30 mg in the afternoon.   Refills:  3        traZODone 50 MG tablet   Commonly known as:  DESYREL   Quantity:  180 tablet        TAKE 1/2-1 TABLET BY MOUTH NIGHTLY AS NEEDED, CAN TITRATE DOWN TO 1/2TAB OR UP TO 2TABS AS NEEDED   Refills:  0        TYLENOL 500 MG tablet   Dose:  500 mg   Generic drug:  acetaminophen        Take 500 mg by mouth nightly as needed   Refills:  0        warfarin 7.5 MG tablet   Commonly known as:  COUMADIN   Quantity:  90 tablet        TAKE 1 TABLET BY MOUTH DAILY. CURRENT DOSE IS 7.5 MG DAILY. DOSE ADJUSTED PER INR RESULT.   Refills:  3        * Notice:  This list has 15 medication(s) that are the same as other medications prescribed for you. Read the directions carefully, and ask your doctor or other care provider to review them with you.            Procedures and tests performed during your visit     INR    Lumbar spine XR, 2-3 views    XR Pelvis 1/2 Views      Orders Needing Specimen Collection     None      Pending Results     Date and Time Order Name Status Description    7/12/2018 1952 INR In process     7/12/2018 1852 XR Pelvis 1/2 Views Preliminary     7/12/2018 1852 Lumbar spine XR, 2-3 views Preliminary             Pending Culture Results     No orders found from 7/10/2018 to 7/13/2018.            Pending Results Instructions     If you had any lab results that were not finalized at the time of your Discharge, you can call the ED Lab Result RN at [phone]. You will be contacted by this team for any positive Lab results or changes in treatment. The nurses are available 7 days a week from 10A to 6:30P.  You  can leave a message 24 hours per day and they will return your call.        Test Results From Your Hospital Stay        7/12/2018  7:43 PM      Narrative     LUMBAR SPINE TWO TO THREE VIEWS  7/12/2018 7:21 PM     COMPARISON: Lumbar spine MRI 6/30/2009    HISTORY: Fell on coccyx 12 days ago, now left low back pain with  radiation down left leg.         Impression     IMPRESSION: There is continued normal alignment of the lumbar  vertebrae. Vertebral body heights of the lumbar spine are normal.  There is no evidence for fracture of the lumbar spine.         7/12/2018  7:43 PM      Narrative     PELVIS ONE TO TWO VIEWS  7/12/2018 7:20 PM     COMPARISON: None.    HISTORY: Fell on coccyx 12 days ago, now left low back pain with  radiation down left leg.    FINDINGS: The visualized bones and joint spaces are within normal  limits.        Impression     IMPRESSION: No evidence for fracture, dislocation or significant  degenerative change of the pelvis or either hip on this single frontal  view.         7/12/2018  8:14 PM                Clinical Quality Measure: Blood Pressure Screening     Your blood pressure was checked while you were in the emergency department today. The last reading we obtained was  BP: 107/66 . Please read the guidelines below about what these numbers mean and what you should do about them.  If your systolic blood pressure (the top number) is less than 120 and your diastolic blood pressure (the bottom number) is less than 80, then your blood pressure is normal. There is nothing more that you need to do about it.  If your systolic blood pressure (the top number) is 120-139 or your diastolic blood pressure (the bottom number) is 80-89, your blood pressure may be higher than it should be. You should have your blood pressure rechecked within a year by a primary care provider.  If your systolic blood pressure (the top number) is 140 or greater or your diastolic blood pressure (the bottom number) is 90 or  greater, you may have high blood pressure. High blood pressure is treatable, but if left untreated over time it can put you at risk for heart attack, stroke, or kidney failure. You should have your blood pressure rechecked by a primary care provider within the next 4 weeks.  If your provider in the emergency department today gave you specific instructions to follow-up with your doctor or provider even sooner than that, you should follow that instruction and not wait for up to 4 weeks for your follow-up visit.        Thank you for choosing [facility]       Thank you for choosing [facility] for your care. Our goal is always to provide you with excellent care. Hearing back from our patients is one way we can continue to improve our services. Please take a few minutes to complete the written survey that you may receive in the mail after you visit with us. Thank you!        [employer] Information     [email or web address] gives you secure access to your electronic health record. If you see a primary care provider, you can also send messages to your care team and make appointments. If you have questions, please call your primary care clinic.  If you do not have a primary care provider, please call [phone] and they will assist you.        Care EveryWhere ID     This is your Care EveryWhere ID. This could be used by other organizations to access your [facility] medical records  [id number]        Equal Access to Services     GENNY STONER : Gilda Trammell, wagabriele perez, qaraheemta kaalmalee tinajero, anderson shaikh. So [facility] [phone].    ATENCIÓN: Si habla español, tiene a conti disposición servicios gratuitos de asistencia lingüística. Llame al [phone].    We comply with applicable federal civil rights laws and [place] laws. We do not discriminate on the basis of race, color, national origin, age, disability, sex, sexual orientation, or gender identity.            After Visit Summary        This is your record. Keep this with you and show to your community pharmacist(s) and doctor(s) at your next visit.

## 2018-07-12 NOTE — TELEPHONE ENCOUNTER
PRIOR AUTHORIZATION DENIED    Medication: Lidocaine 5% patch    Denial Date: 7/12/2018    Denial Rational:  Lidoderm patches are only covered with the diagnosis of post-herpetic neuralgia, diabetic neuropathy, or cancer related pain. It will not be covered for the associated diagnosis.       Appeal Information:    If you would like to appeal, please supply P/A team with a letter of medical necessity with clinical reason.

## 2018-07-12 NOTE — TELEPHONE ENCOUNTER
Prior Authorization Retail Medication Request    Medication/Dose: Lidocaine 5% patch  ICD code (if different than what is on RX):    Previously Tried and Failed:  Ibuprofen, Tylenol, Gabapentin, Cyclobenzapine  Rationale:      Insurance Name:  [phone]  Insurance ID:  [id number]      Pharmacy Information (if different than what is on RX)  Name:  [facility]  Phone: [phone]

## 2018-07-12 NOTE — TELEPHONE ENCOUNTER
CW,   [facility]:   Also, X-Ray seems to be out of the picture. Could you get the DrLester to prescribe a couple of those patches one puts on the area in spasm? Long time ago, the DrLester prescribed  Lidocaine patches to help calm the spasm in my lower back and they seemed to help.   Thanks for any attention you can give to this!  Betty    Pended pharmacy  Please advise on Rx for patches if appropriate  Thanks,  Lydia HALEY RN

## 2018-07-12 NOTE — TELEPHONE ENCOUNTER
Ordered the lidocaine patches for the pain.  We did discuss her back pain at the office visit, but did not discuss getting an xray- would have done it at the visit.  If her sx's are getting worse and not better, she could go to the [facility] same day clinic for ortho/sports med eval and xray if deemed appropriate.  I can see her back sooner as well.  Thanks,  CW

## 2018-07-12 NOTE — ED AVS SNAPSHOT
Emergency Department    [address] [place] [address]    Phone:  [phone]    Fax:  [phone]                                       Betty Tee   MRN: [id number]    Department:   Emergency Department   Date of Visit:  7/12/2018           After Visit Summary Signature Page     I have received my discharge instructions, and my questions have been answered. I have discussed any challenges I see with this plan with the nurse or doctor.    ..........................................................................................................................................  Patient/Patient Representative Signature      ..........................................................................................................................................  Patient Representative Print Name and Relationship to Patient    ..................................................               ................................................  Date                                            Time    ..........................................................................................................................................  Reviewed by Signature/Title    ...................................................              ..............................................  Date                                                            Time

## 2018-07-12 NOTE — TELEPHONE ENCOUNTER
Central Prior Authorization Team   Phone: [phone]      PA Initiation    Medication: Lidocaine 5% patch  Insurance Company: [employer] - Phone [phone] Fax [phone]  Pharmacy Filling the Rx: [id number] [place], [place] - [address] [facility]  Filling Pharmacy Phone: [phone]  Filling Pharmacy Fax:    Start Date: 7/12/2018

## 2018-07-12 NOTE — ED PROVIDER NOTES
History     Chief Complaint:    Fall     HPI   Betty Tee is a 78 year old female who currently takes Coumadin who presents to the ED for evaluation of a fall. The patient reports that she was carrying some groceries up the stairs into her house 11 days ago when she fell backwards onto her buttocks. She did not hit her head or lose consciousness during the fall. Since the fall, she had been having increasing soreness and pain in her lower back over her lumbar spine. Yesterday, she states that she developed additional symptoms of a burning sensation that radiates down the front of her left leg and her left foot has also fallen asleep. She states that she is struggling to walk because of the pain but her friend was kind enough to push her around with a wheelchair today. The patient has no other complaints at this time.     Allergies:  Augmentin  Codeine  Phenobarbital     Medications:    Tylenol  Zovirax  Albuterol  Fosamax  Lipitor  Lexapro  Insulin  Nizoral  Lantus solostar  Lisinopril  Metformin  Toprol  Singulair  Novolog   Deltasone  Aldactone  Demadex  Desyrel  Coumadin     Past Medical History:    Alcohol abuse, in remission  Allergic rhinitis  Antiplatelet or antuthrombotic long-term use  A-fib  Cardiomegaly  DVT  Disorder of bone and cartilage  Diverticulosis of colon  HTN  GERD  Insomnia  ILD  Lumbago  Depression  Obstructive sleep apnea  Osteoarthrosis  Sciatica  Sjogren's syndrome  Tobacco use disorder, in remission    Past Surgical History:    Back surgery  Biopsy breast  Appendectomy  Cardiac surgery  Cholecystectomy  Colectomy left  Hysterectomy total abdominal, bilateral salpingo-oophorectomy  Insert stent ureter  Sigmoidoscopy  Takedown ileostomy     Family History:    Father: CAD, Alzheimer disease, dementia, HTN, Hyperlipidemia,   Mother: CAD, HTN, Cerebrovascular disease, hyperlipidemia  Sister: CAD, HTN, colorectal cancer, hyperlipidemia, diverticulitis, HTN  Brother: Prostate cancer,  "substance abuse, Parkinson's, hyperlipidemia, HTN    Social History:  Former smoker, quit 8/1/2011.  Negative for alcohol use.  Marital Status:  Single [1]     Review of Systems   Musculoskeletal: Positive for back pain and myalgias.   Neurological: Negative for syncope.   All other systems reviewed and are negative.    Physical Exam   Vitals:  Patient Vitals for the past 24 hrs:   BP Temp Temp src Heart Rate Resp SpO2 Height Weight   07/12/18 1819 107/66 98.2  F (36.8  C) Oral 81 16 98 % 1.702 m (5' 7\") 96.2 kg (212 lb)     Physical Exam  General: Alert and interactive. Appears well. Cooperative and pleasant.   Eyes: The pupils are equal and round. EOMs intact. No scleral icterus.  ENT: No abnormalities to the external nose or ears. Mucous membranes moist. Posterior oropharynx is non-erythematous.      Neck: Trachea is in the midline. No nuchal rigidity.     CV: Irregularly irregular rhythm, rate as above. S1 and S2 normal without murmur, click, gallop or rub.   Resp: Breath sounds are clear bilaterally, without rhonchi, wheezes, rales. Non-labored, no retractions or accessory muscle use.     GI: Abdomen is soft without distension. No tenderness to palpation. No peritoneal signs.    MS: Moving all extremities well. Good muscle tone. Patient has midline tenderness around the L4 level in the center of her back, just superior to previous laminectomy incision. She has some tenderness in the left low back and down into the left buttock. In the area of the tenderness, the patient has an area of ecchymosis, suggestive of a potential hematoma. Good ROM in left and right hip.   Skin: Warm and dry. No rash or lesions noted.  Neuro: Alert and oriented x 3. No focal neurologic deficits. Good strength and sensation in upper and lower extremities.    Psych: Awake. Alert.  Normal affect. Appropriate interactions.  Lymph: No anterior or posterior cervical lymphadenopathy noted.    Emergency Department Course "   Imaging:  Radiographic findings were communicated with the patient who voiced understanding of the findings.  XR Lumbar spine, 2-3 views:   There is continued normal alignment of the lumbar  vertebrae. Vertebral body heights of the lumbar spine are normal.  There is no evidence for fracture of the lumbar spine as per radiology.     XR Pelvis, 1/2 views:   No evidence for fracture, dislocation or significant  degenerative change of the pelvis or either hip on this single frontal  view as per radiology.     Laboratory:  INR: Pending.     Interventions:  2010 Lidocaine 1 patch, transdermal     Emergency Department Course:  Nursing notes and vitals reviewed. (1835) I performed an exam of the patient as documented above.     The patient was sent for a Lumbar spine XR and Pelvis XR while in the emergency department, findings above.     2000 I rechecked the patient and discussed the results of her workup thus far.     Findings and plan explained to the Patient. Patient discharged home with instructions regarding supportive care, medications, and reasons to return. The importance of close follow-up was reviewed.       Impression & Plan    Medical Decision Making: Betty Tee is a 78 year old female on coumadin, who presents for evaluation of left buttock pain after a fall. She did not hit her head, and there is no concerning evidence for an intracranial bleed. X-ray of his pelvis and lumbar spine is negative for any acute fracture. Patient does have a hematoma overlying her left low back and superior buttock. I do think this is a muscle/bone contusion, which will improve with time, rest, ice, and Tylenol for symptomatic treatment. The patient was given a lidocaine patch here, as she already has a prescription for these from her primary provider.The patient's INR was checked, as she prefers to have it checked today rather than come back tomorrow. She will be called with her results.  Patient has good strength and  sensation in her lower extremities, and there is no evidence of cauda equina syndrome. No other evidence of neurovascular compromise or compartment syndrome. She was ambulated in the emergency department and is safe for discharge. She will follow up with her PCP in three days for a recheck and here if anything worsens in the meantime.      Diagnosis:    ICD-10-CM    1. Fall, initial encounter W19.XXXA INR   2. Left buttock pain M79.1    3. Acute midline low back pain with left-sided sciatica M54.42      Disposition: Discharged to home    Onelia SHERIDAN PA-C, interviewed the patient, explained the course of action and discussed the patient with Dr. Castillo, who then evaluated the patient.    Scribe Disclosure:  I,  Zane Bey, am serving as a scribe on 7/12/2018 at 6:35 PM to personally document services performed JAKE Beckman based on my observations and the provider's statements to me.     Zane Bey  7/12/2018    EMERGENCY DEPARTMENT       Onelia Carter PA-C  07/12/18 [id number]

## 2018-07-13 ENCOUNTER — MYC MEDICAL ADVICE (OUTPATIENT)
Dept: FAMILY MEDICINE | Facility: CLINIC | Age: 78
End: 2018-07-13

## 2018-07-13 ENCOUNTER — PATIENT OUTREACH (OUTPATIENT)
Dept: CARE COORDINATION | Facility: CLINIC | Age: 78
End: 2018-07-13

## 2018-07-13 NOTE — ED PROVIDER NOTES
"Emergency Department Attending Supervision Note  7/12/2018  8:21 PM      I evaluated this patient in conjunction with Onelia Carter PA-C      Briefly, the patient presented with :    Betty Tee is a 78 year old female who currently takes Coumadin who presents to the ED for evaluation of a fall. The patient reports that she was carrying some groceries up the stairs into her house 11 days ago when she fell backwards onto her buttocks. She did not hit her head or lose consciousness during the fall. Since the fall, she had been having increasing soreness and pain in her lower back over her lumbar spine. Yesterday she states that she developed additional symptoms of a burning sensation that radiates down the front of her left leg and her left foot has also fallen asleep. She states that she is struggling to walk because of the pain but her friend was kind enough to push her around with a wheelchair today. The patient has no other complaints at this time.      On my exam:  /66  Temp 98.2  F (36.8  C) (Oral)  Resp 16  Ht 1.702 m (5' 7\")  Wt 96.2 kg (212 lb)  SpO2 98%  BMI 33.2 kg/m2  General: Alert and cooperative with exam. Patient in mild distress. Normal mentation.  Head:  Scalp is NC/AT  Eyes:  No scleral icterus, PERRL  ENT:  The external nose and ears are normal. The oropharynx is normal and without erythema; mucus membranes are moist. Uvula midline, no evidence of deep space infection.  Neck:  Normal range of motion without rigidity.  CV:  Regular rate and rhythm    No pathologic murmur   Resp:  Breath sounds are clear bilaterally    Non-labored, no retractions or accessory muscle use  GI:  Abdomen is soft, no distension, no tenderness. No peritoneal signs  MS:  No lower extremity edema.  Tenderness to palpation over the left gluteal area with associated bruising.  CMS intact.  Normal active and passive range of motion of the left lower extremity.  Mild midline lumbar tenderness without overlying " skin change.  Skin:  Warm and dry, No rash or lesions noted.  Neuro: Oriented x 3. No gross motor deficits.        My impression is:  Patient is a 78-year-old female presents status post mechanical fall roughly 11 days ago.  She reports continued left hip/buttock/lower back pain.  Patient's medical history and records reviewed.  On evaluation patient is noted to have mild bruising to her left thigh with mild tenderness to palpation.  X-rays obtained and unremarkable as noted above.  She is provided lidocaine patch while in the emergency department.  Patient's INR was checked and found to be therapeutic (2.72).  She was able to ambulate without significant difficulty.  Presentation most consistent with soft tissue injury.  Recommended continued supportive care.  Return precautions were discussed.  At the time of discharge patient was hemodynamically stable, neurologically intact, and afebrile; pain well controlled.  Patient to follow-up closely with PCP as needed.        Diagnosis    ICD-10-CM    1. Fall, initial encounter W19.XXXA INR   2. Left buttock pain M79.1    3. Acute midline low back pain with left-sided sciatica M54.42             Juan Diego Castillo,   07/13/18 2137

## 2018-07-13 NOTE — TELEPHONE ENCOUNTER
Supriya,   Please see below   Sounds like pt needs Lidocaine PA  Please advise.  Lydia HALEY RN

## 2018-07-16 ENCOUNTER — TELEPHONE (OUTPATIENT)
Dept: FAMILY MEDICINE | Facility: CLINIC | Age: 78
End: 2018-07-16
Payer: MEDICARE

## 2018-07-16 PROCEDURE — 99207 ZZC NO CHARGE NURSE ONLY: CPT | Performed by: FAMILY MEDICINE

## 2018-07-17 NOTE — TELEPHONE ENCOUNTER
I had discussed with pt that the rx was unlikely to be covered- see 7/13/18 mychart encounter.  Thanks- CW

## 2018-07-17 NOTE — TELEPHONE ENCOUNTER
CW,  Coverage of Lidocaine patch was denied.  Requires dx of post herpetic neuralgia, cancer related pain or diabetic neuropathy.  LM on pt's VM to call clinic.

## 2018-07-17 NOTE — TELEPHONE ENCOUNTER
Patient has not responded to MyChart message  Called pt and left  for her to callback or check MyChart  Lydia HALEY RN

## 2018-07-18 RX ORDER — FERROUS SULFATE 325(65) MG
325 TABLET ORAL
Qty: 30 TABLET | Refills: 2 | Status: CANCELLED | OUTPATIENT
Start: 2018-07-18

## 2018-07-20 NOTE — TELEPHONE ENCOUNTER
ANTICOAGULATION FOLLOW-UP CLINIC VISIT    Patient Name:  Betty Tee  Date:  7/20/2018  Contact Type:  Telephone    SUBJECTIVE:  Bleeding Signs/Symptoms: None  Thromboembolic Signs/Symptoms: None    Medication Changes:  No  Dietary Changes:  No  Bacterial/Viral Infection:  No    Missed Coumadin Doses:  None  Other Concerns:  No      ASSESSMENT/PLAN:  See: ANTICOAGULATION [facility] flow sheet.    INR 2.72 in ER 7/12/2018  Pt confirmed she's on 7.5mg daily  Advised continuing and rechecking INR in 1-2 weeks  Has nephew that is dying right now - hard to come in she said  Lydia HALEY RN    Dosage adjustment made based on physician directed care plan.    JIMI VOGT

## 2018-07-20 NOTE — TELEPHONE ENCOUNTER
Huddled with JANA - quinn that pt not responding to calls or MyChart  Recommended calling an emergency contact    Called Nghia (friend) who states pt has been out of town since Monday  Returned home today but at a   Patient should be available this afternoon she said    Will try calling pt then  Lydia HALEY RN

## 2018-07-22 DIAGNOSIS — J84.9 ILD (INTERSTITIAL LUNG DISEASE) (H): ICD-10-CM

## 2018-07-22 DIAGNOSIS — M35.02 SJOGREN'S SYNDROME WITH LUNG INVOLVEMENT (H): ICD-10-CM

## 2018-07-22 DIAGNOSIS — J30.2 CHRONIC SEASONAL ALLERGIC RHINITIS DUE TO OTHER ALLERGEN: ICD-10-CM

## 2018-07-22 DIAGNOSIS — I10 ESSENTIAL HYPERTENSION WITH GOAL BLOOD PRESSURE LESS THAN 140/90: ICD-10-CM

## 2018-07-24 RX ORDER — LISINOPRIL 2.5 MG/1
TABLET ORAL
Qty: 90 TABLET | Refills: 0 | Status: SHIPPED | OUTPATIENT
Start: 2018-07-24 | End: 2018-10-19

## 2018-07-24 RX ORDER — MONTELUKAST SODIUM 10 MG/1
TABLET ORAL
Qty: 90 TABLET | Refills: 0 | Status: SHIPPED | OUTPATIENT
Start: 2018-07-24 | End: 2018-10-19

## 2018-07-25 ENCOUNTER — RADIANT APPOINTMENT (OUTPATIENT)
Dept: CT IMAGING | Facility: CLINIC | Age: 78
End: 2018-07-25
Attending: INTERNAL MEDICINE
Payer: MEDICARE

## 2018-07-25 ENCOUNTER — OFFICE VISIT (OUTPATIENT)
Dept: PULMONOLOGY | Facility: CLINIC | Age: 78
End: 2018-07-25
Attending: INTERNAL MEDICINE
Payer: MEDICARE

## 2018-07-25 ENCOUNTER — OFFICE VISIT (OUTPATIENT)
Dept: ORTHOPEDICS | Facility: CLINIC | Age: 78
End: 2018-07-25
Payer: MEDICARE

## 2018-07-25 VITALS
DIASTOLIC BLOOD PRESSURE: 73 MMHG | SYSTOLIC BLOOD PRESSURE: 105 MMHG | BODY MASS INDEX: 33.27 KG/M2 | WEIGHT: 212 LBS | HEIGHT: 67 IN

## 2018-07-25 VITALS
HEIGHT: 67 IN | BODY MASS INDEX: 33.27 KG/M2 | SYSTOLIC BLOOD PRESSURE: 105 MMHG | RESPIRATION RATE: 17 BRPM | HEART RATE: 88 BPM | OXYGEN SATURATION: 97 % | DIASTOLIC BLOOD PRESSURE: 73 MMHG | WEIGHT: 212 LBS

## 2018-07-25 DIAGNOSIS — J84.9 ILD (INTERSTITIAL LUNG DISEASE) (H): ICD-10-CM

## 2018-07-25 DIAGNOSIS — M54.42 ACUTE LEFT-SIDED LOW BACK PAIN WITH LEFT-SIDED SCIATICA: Primary | ICD-10-CM

## 2018-07-25 DIAGNOSIS — J84.9 ILD (INTERSTITIAL LUNG DISEASE) (H): Primary | ICD-10-CM

## 2018-07-25 PROCEDURE — G0463 HOSPITAL OUTPT CLINIC VISIT: HCPCS | Mod: ZF

## 2018-07-25 ASSESSMENT — ENCOUNTER SYMPTOMS
PARALYSIS: 0
HEADACHES: 0
MEMORY LOSS: 0
LOSS OF CONSCIOUSNESS: 0
MUSCLE WEAKNESS: 1
DIZZINESS: 1
STIFFNESS: 0
SPEECH CHANGE: 0
TINGLING: 1
NECK PAIN: 1
BACK PAIN: 1
MUSCLE CRAMPS: 1
DISTURBANCES IN COORDINATION: 0
JOINT SWELLING: 0
SEIZURES: 0
NUMBNESS: 1
WEAKNESS: 0
MYALGIAS: 1
ARTHRALGIAS: 0
TREMORS: 0

## 2018-07-25 NOTE — MR AVS SNAPSHOT
After Visit Summary   7/25/2018    Betty Tee    MRN: [id number]           Patient Information     Date Of Birth          1940        Visit Information        Provider Department      7/25/2018 9:30 AM Meño Walsh MD [facility]        Today's Diagnoses     ILD (interstitial lung disease) (H)    -  1      Care Instructions    Patient is instructed to call if there is any changes in symptoms.          Follow-ups after your visit        Follow-up notes from your care team     Return in about 3 months (around 10/25/2018).      Your next 10 appointments already scheduled     Jul 25, 2018  8:00 PM CDT   CT LUMBAR SPINE W/O CONTRAST with [address] [place] [address] CT ([facility])    [address] [place] [address] [phone]           Please bring any scans or X-rays taken at other hospitals, if similar tests were done. Also bring a list of your medicines, including vitamins, minerals and over-the-counter drugs. It is safest to leave personal items at home.  Be sure to tell your doctor:   If you have any allergies.   If there s any chance you are pregnant.   If you are breastfeeding.  You do not need to do anything special to prepare for this exam.  Please wear loose clothing, such as a sweat suit or jogging clothes. Avoid snaps, zippers and other metal. We may ask you to undress and put on a hospital gown.            Jul 25, 2018  8:20 PM CDT   CT CHEST W/O CONTRAST with UCCT1   [facility] CT ([facility])    [address] [address] [phone]           Please bring any scans or X-rays taken at other hospitals, if similar tests were done. Also bring a list of your medicines, including vitamins, minerals and over-the-counter drugs. It is safest to leave personal items at home.  Be sure to tell your doctor:   If you have any  allergies.   If there s any chance you are pregnant.   If you are breastfeeding.  You do not need to do anything special to prepare for this exam.  Please wear loose clothing, such as a sweat suit or jogging clothes. Avoid snaps, zippers and other metal. We may ask you to undress and put on a hospital gown.            Aug 17, 2018  1:00 PM CDT   Office Visit with Ilene Tristan MD   [facility] ([facility])    [address] [facility] [id number] [phone]           Bring a current list of meds and any records pertaining to this visit. For Physicals, please bring immunization records and any forms needing to be filled out. Please arrive 10 minutes early to complete paperwork.            Oct 17, 2018 12:00 PM CDT   Lab with SH[facility]   [facility] ([facility])    [address]  1st Floor  [facility] [id number] [phone]            Oct 17, 2018 12:30 PM CDT   (Arrive by 12:15 PM)   CORE RETURN with LIZY Tyler CNP   [facility] ([facility])    [address]  [address]  [facility] [id number] [phone]            Nov 01, 2018  8:00 AM CDT   FULL PULMONARY FUNCTION with  PFL B   [facility] ([facility])    [address]  3rd Floor  [facility] [id number] [phone]            Nov 01, 2018  9:00 AM CDT   (Arrive by 8:45 AM)   Return Interstitial Lung with Maryjane Tillman MD   [facility] ([facility])    [address]  [address]  [facility] [id number] [phone]            Dec 06, 2018  3:00 PM CST   (Arrive by 2:45 PM)   Return Visit with Carmenza Stringer MD   [facility] ([facility])    [address]  [address]  [facility] [id number] [phone]              Future tests that were  "ordered for you today     Open Future Orders        [facility] Expected Expires Ordered    CT Lumbar Spine w/o Contrast Routine  7/25/2019 7/25/2018    General PFT Lab (Please always keep checked) Routine  7/25/2019 7/25/2018    Pulmonary Function Test Routine  7/25/2019 7/25/2018            Who to contact     If you have questions or need follow up information about today's clinic visit or your schedule please contact [facility] directly at [phone].  Normal or non-critical lab and imaging results will be communicated to you by Om[employer], letter or phone within 4 business days after the clinic has received the results. If you do not hear from us within 7 days, please contact the clinic through [employer] or phone. If you have a critical or abnormal lab result, we will notify you by phone as soon as possible.  Submit refill requests through [employer] or call your pharmacy and they will forward the refill request to us. Please allow 3 business days for your refill to be completed.          Additional Information About Your Visit        [employer] Information     [employer] gives you secure access to your electronic health record. If you see a primary care provider, you can also send messages to your care team and make appointments. If you have questions, please call your primary care clinic.  If you do not have a primary care provider, please call [phone] and they will assist you.        Care EveryWhere ID     This is your Care EveryWhere ID. This could be used by other organizations to access your [facility] medical records  [id number]        Your Vitals Were     Pulse Respirations Height Pulse Oximetry BMI (Body Mass Index)       88 17 1.702 m (5' 7\") 97% 33.2 kg/m2        Blood Pressure from Last 3 Encounters:   07/25/18 105/73   07/12/18 107/66   07/06/18 114/73    Weight from Last 3 Encounters:   07/25/18 96.2 kg (212 lb)   07/12/18 96.2 kg (212 lb)   07/06/18 95.3 kg (210 lb)         "      We Performed the Following     CT Chest w/o Contrast          Today's Medication Changes          These changes are accurate as of 7/25/18  9:51 AM.  If you have any questions, ask your nurse or doctor.               These medicines have changed or have updated prescriptions.        Dose/Directions    acyclovir 5 % ointment   Commonly known as:  ZOVIRAX   This may have changed:  additional instructions   Used for:  Recurrent cold sores        Apply topically 6 times daily   Quantity:  15 g   Refills:  3       fluticasone 50 MCG/ACT spray   Commonly known as:  FLONASE   This may have changed:    - when to take this  - reasons to take this   Used for:  Seasonal allergic rhinitis        Dose:  1-2 spray   Spray 1-2 sprays into both nostrils daily   Quantity:  16 g   Refills:  5                Primary Care Provider Office Phone # Fax #    Ilene Tristan [phone] [address] [address]        Equal Access to Services     [facility] Center[facility] : Hadii aad ku hadasho Soomaali, waaxda luqadaha, qaybta kaalmada adeegyada, waxay idiin hayaan claudio mercedes . So [facility] [phone].    ATENCIÓN: Si habla español, tiene a conti disposición servicios gratuitos de asistencia lingüística. Noel al [phone].    We comply with applicable federal civil rights laws and [place] laws. We do not discriminate on the basis of race, color, national origin, age, disability, sex, sexual orientation, or gender identity.            Thank you!     Thank you for choosing [facility]  for your care. Our goal is always to provide you with excellent care. Hearing back from our patients is one way we can continue to improve our services. Please take a few minutes to complete the written survey that you may receive in the mail after your visit with us. Thank you!             Your Updated Medication List - Protect others around you: Learn how to safely use,  store and throw away your medicines at www.disposemymeds.org.          This list is accurate as of 7/25/18  9:51 AM.  Always use your most recent med list.                   Brand Name Dispense Instructions for use Diagnosis    * acyclovir 400 MG tablet    ZOVIRAX     Take 400 mg by mouth See Admin Instructions 5 times daily as needed for outbreaks        * acyclovir 400 MG tablet    ZOVIRAX    15 tablet    Take 1 tablet (400 mg) by mouth 3 times daily For a couple days    Herpes labialis       acyclovir 5 % ointment    ZOVIRAX    15 g    Apply topically 6 times daily    Recurrent cold sores       albuterol 108 (90 Base) MCG/ACT Inhaler    PROAIR HFA/PROVENTIL HFA/VENTOLIN HFA    1 Inhaler    Inhale 2 puffs into the lungs every 6 hours    ILD (interstitial lung disease) (H)       alendronate 70 MG tablet    FOSAMAX    4 tablet    Take 1 tablet (70 mg) by mouth every 7 days    Other osteoporosis without current pathological fracture       atorvastatin 10 MG tablet    LIPITOR    90 tablet    Take 1 tablet (10 mg) by mouth daily    Hyperlipidemia LDL goal <100       * blood glucose monitoring lancets     4 Box    Use to test blood sugar 4 times daily or as directed.    Type 2 diabetes mellitus without complication, without long-term current use of insulin (H)       * blood glucose monitoring lancets     1 Box    Use to test blood sugar 4 times daily or as directed.  Ok to substitute alternative if insurance prefers.    Type 2 diabetes mellitus without complication, without long-term current use of insulin (H)       * blood glucose monitoring test strip    ACCU-CHEK SHANNON PLUS    120 strip    Use to test blood sugar 4 times daily or as directed.  Ok to substitute alternative if insurance prefers.    Type 2 diabetes mellitus without complication, without long-term current use of insulin (H)       * ACCU-CHEK SHANNON PLUS test strip   Generic drug:  blood glucose monitoring     400 strip    USE TO TEST BLOOD SUGARS 4 TIMES  DAILY OR AS DIRECTED    Type 2 diabetes mellitus without complication, without long-term current use of insulin (H)       COMPRESSION STOCKINGS     2 each    Wear compression stockings in affected leg (right leg) or both legs most of the time during the day and take them off at night.    DVT (deep venous thrombosis), right, Postphlebitic syndrome, Chronic anticoagulation, Atrial fibrillation (H)       cyclobenzaprine 10 MG tablet    FLEXERIL    14 tablet    Take 1 tablet (10 mg) by mouth nightly as needed for muscle spasms    Acute bilateral low back pain with left-sided sciatica       escitalopram 20 MG tablet    LEXAPRO    30 tablet    TAKE 1 TABLET (20 MG) BY MOUTH DAILY    Major depressive disorder, recurrent episode, moderate (H)       fluticasone 220 MCG/ACT Inhaler    FLOVENT HFA    3 Inhaler    Inhale 2 puffs into the lungs 2 times daily    ILD (interstitial lung disease) (H), Follicular bronchiolitis (H)       fluticasone 50 MCG/ACT spray    FLONASE    16 g    Spray 1-2 sprays into both nostrils daily    Seasonal allergic rhinitis       * insulin pen needle 31G X 8 MM    B-D U/F    400 each    Use 4 times daily (with Lantus and Novolog) or as directed.    Type 2 diabetes mellitus without complication, without long-term current use of insulin (H)       * BD JANE U/F 32G X 4 MM   Generic drug:  insulin pen needle     400 each    USE 4 DAILY AS DIRECTED.    Type 2 diabetes mellitus without complication, without long-term current use of insulin (H)       ketoconazole 2 % cream    NIZORAL    60 g    Apply topically 2 times daily    Cutaneous candidiasis       LANTUS SOLOSTAR 100 UNIT/ML injection   Generic drug:  insulin glargine     15 mL    INJECT 25 UNTIS SUBCUTANEOUSLY EVERY DAY    Type 2 diabetes mellitus with hyperglycemia, with long-term current use of insulin (H)       lidocaine 5 % Patch    LIDODERM    30 patch    Apply patch to painful area for up to 12 h within a 24 h period.  Remove after 12 hours.     Sacral back pain       lisinopril 2.5 MG tablet    PRINIVIL/Zestril    90 tablet    TAKE 1 TABLET (2.5 MG) BY MOUTH DAILY    Essential hypertension with goal blood pressure less than 140/90       metFORMIN 500 MG 24 hr tablet    GLUCOPHAGE-XR    180 tablet    TAKE 2 TABLETS BY MOUTH DAILY WITH DINNER    Type 2 diabetes mellitus without complication, without long-term current use of insulin (H)       * metoprolol succinate 100 MG 24 hr tablet    TOPROL-XL    90 tablet    Take 50 mg by mouth in combination with 12.5 for a total of 62.5 twice a day    Atrial fibrillation with controlled ventricular response (H)       * metoprolol succinate 25 MG 24 hr tablet    TOPROL XL    30 tablet    Take 0.5 tablets (12.5 mg) by mouth 2 times daily Take 50 mg by mouth in combination with 12.5 for a total of 62.5 twice a day    (HFpEF) heart failure with preserved ejection fraction (H), Persistent atrial fibrillation (H)       montelukast 10 MG tablet    SINGULAIR    90 tablet    TAKE 1 TABLET BY MOUTH AT BEDTIME    Sjogren's syndrome with lung involvement (H), ILD (interstitial lung disease) (H), Chronic seasonal allergic rhinitis due to other allergen       NovoLOG FLEXPEN 100 UNIT/ML injection   Generic drug:  insulin aspart     15 mL    INJECT 12 UNITS SUBCUTANEOUS 3 TIMES DAILY (WITH MEALS)    Type 2 diabetes mellitus with other specified complication (H)       * order for DME     1 Device    Oxygen: Patient requires supplemental Oxygen 4 LPM via nasal canula with activity. Please provide patient with a home unit and with portability capability. Oxygen will be for a lifetime.    Hypoxia, ILD (interstitial lung disease) (H)       * order for DME     1 each    Equipment being ordered: Full face mask for CPAP - size: F10 large    ANGELICA (obstructive sleep apnea)       potassium chloride SA 20 MEQ CR tablet    K-DUR/KLOR-CON M    180 tablet    Increase KCL to 40 MEQ in AM and 20 MEQ in the evening.    (HFpEF) heart failure with  preserved ejection fraction (H)       * predniSONE 10 MG tablet    DELTASONE    90 tablet    Take 2 tablets for three days (4/12-14), then take 1 tablet daily (10 mg daily starting 4/15)    ILD (interstitial lung disease) (H), Sjogren's syndrome with lung involvement (H)       * predniSONE 1 MG tablet    DELTASONE    120 tablet    Use with 5 mg tabs to taper: 10 mg and 9 mg every other day for 1m, 9 mg/day for 1m, 9 mg and 8 mg every other day for 1m, etc. until at 7mg    Primary Sjogren's syndrome (H)       * predniSONE 5 MG tablet    DELTASONE    30 tablet    Use with 1 mg tabs to taper: 10 mg and 9 mg every other day for 1m, 9 mg/day for 1m, 9 mg and 8 mg every other day for 1m, etc. until at 7mg    Primary Sjogren's syndrome (H)       spironolactone 25 MG tablet    ALDACTONE    180 tablet    Take 2 tablets (50 mg) by mouth daily    Chronic diastolic congestive heart failure (H)       torsemide 20 MG tablet    DEMADEX    105 tablet    Take 40 mg in the morning, 30 mg in the afternoon.    (HFpEF) heart failure with preserved ejection fraction (H)       traZODone 50 MG tablet    DESYREL    180 tablet    TAKE 1/2-1 TABLET BY MOUTH NIGHTLY AS NEEDED, CAN TITRATE DOWN TO 1/2TAB OR UP TO 2TABS AS NEEDED    Insomnia due to medical condition       TYLENOL 500 MG tablet   Generic drug:  acetaminophen      Take 500 mg by mouth nightly as needed    Dizziness       warfarin 7.5 MG tablet    COUMADIN    90 tablet    TAKE 1 TABLET BY MOUTH DAILY. CURRENT DOSE IS 7.5 MG DAILY. DOSE ADJUSTED PER INR RESULT.    Atrial fibrillation with controlled ventricular response (H)       * Notice:  This list has 15 medication(s) that are the same as other medications prescribed for you. Read the directions carefully, and ask your doctor or other care provider to review them with you.

## 2018-07-25 NOTE — PROGRESS NOTES
[facility]   Pulmonary Clinic Follow Up Note  July 25, 2018      Betty Tee MRN# [id number]   Age: 78 year old YOB: 1940     Date of Consultation: July 25, 2018    Primary care provider: Ilene Tristan     History taken from; Patient     Betty Tee is a 78 year old female seen in the Pulmonary Clinic  for f/u.  Chief Complaint   Patient presents with     RECHECK     F/U ILD   .          Pulmonary Problem List / Reason for follow up:   1. ILD  2. KAMILAH           Assessment and Plan:        ASSESSMENT AND PLAN:  The patient is a 78-year-old lady with history of interstitial lung disease and bronchiolitis obliterans associated with Sjogren disease, coming for followup.  The patient is worse according to the pulmonary function test.   1.  Interstitial lung disease.  We will continue with 10 mg of the prednisone.  We will repeat a CT of the chest because of the patient's worse pulmonary function tests, although we cannot rule out the possibility that this was significantly affected by the back injury.  Patient did not notice any injury.   2.  Bronchiolitis obliterans.  We will continue with albuterol and Flovent.  Auscultatory the patient has some minimal wheezing in the right upper lobe.  Otherwise, the patient is stable.        We will also refer the patient to the Ortho Clinic.  We will do the CT of the spine and a CT of the chest.  The patient will follow up with another pulmonary function test in 3 months.      We explained the plan to the patient including the risks and benefits.  The patient expressed understanding and agreed with the plan.      Thank you very much for the opportunity to participate in the care of this very pleasant patient.         Return visit in 3 months      Meño Walsh M.D.         History of Present Illness / Interval History:     CHIEF COMPLAINT:  Interstitial lung disease and bronchiolitis obliterans.      HISTORY  OF PRESENT ILLNESS:  The patient is a 78-year-old lady who recently had a trauma with a fall.  The patient had quite a significant fall with back pain and that has certainly diminished her mobility.  The patient is coming for followup and she denies shortness of breath; however, she also has very limited mobility.  The pulmonary function tests unfortunately reveal a significant decline in FVC and FEV1, although patient did not notice any worsening of symptoms.                Pulmonary Data:         Exposure history: Asbestos;  No , TB;  No , Radiation;   No          Medications:     Current Outpatient Prescriptions   Medication Sig     ACCU-CHEK SHANNON PLUS test strip USE TO TEST BLOOD SUGARS 4 TIMES DAILY OR AS DIRECTED     acetaminophen (TYLENOL) 500 MG tablet Take 500 mg by mouth nightly as needed      acyclovir (ZOVIRAX) 400 MG tablet Take 1 tablet (400 mg) by mouth 3 times daily For a couple days     acyclovir (ZOVIRAX) 400 MG tablet Take 400 mg by mouth See Admin Instructions 5 times daily as needed for outbreaks     acyclovir (ZOVIRAX) 5 % ointment Apply topically 6 times daily (Patient taking differently: Apply topically 6 times daily As needed for outbreaks)     albuterol (PROAIR HFA, PROVENTIL HFA, VENTOLIN HFA) 108 (90 BASE) MCG/ACT inhaler Inhale 2 puffs into the lungs every 6 hours     alendronate (FOSAMAX) 70 MG tablet Take 1 tablet (70 mg) by mouth every 7 days     atorvastatin (LIPITOR) 10 MG tablet Take 1 tablet (10 mg) by mouth daily     BD JANE U/F 32G X 4 MM insulin pen needle USE 4 DAILY AS DIRECTED.     blood glucose monitoring (ACCU-CHEK SHANNON PLUS) test strip Use to test blood sugar 4 times daily or as directed.  Ok to substitute alternative if insurance prefers.     blood glucose monitoring (ACCU-CHEK FASTCLIX) lancets Use to test blood sugar 4 times daily or as directed.  Ok to substitute alternative if insurance prefers.     blood glucose monitoring (ACCU-CHEK MULTICLIX) lancets Use to test  blood sugar 4 times daily or as directed.     COMPRESSION STOCKINGS Wear compression stockings in affected leg (right leg) or both legs most of the time during the day and take them off at night.     cyclobenzaprine (FLEXERIL) 10 MG tablet Take 1 tablet (10 mg) by mouth nightly as needed for muscle spasms     escitalopram (LEXAPRO) 20 MG tablet TAKE 1 TABLET (20 MG) BY MOUTH DAILY     fluticasone (FLONASE) 50 MCG/ACT nasal spray Spray 1-2 sprays into both nostrils daily (Patient taking differently: Spray 1-2 sprays into both nostrils daily as needed for allergies )     fluticasone (FLOVENT HFA) 220 MCG/ACT inhaler Inhale 2 puffs into the lungs 2 times daily     insulin pen needle (B-D U/F) 31G X 8 MM Use 4 times daily (with Lantus and Novolog) or as directed.     ketoconazole (NIZORAL) 2 % cream Apply topically 2 times daily     LANTUS SOLOSTAR 100 UNIT/ML soln INJECT 25 UNTIS SUBCUTANEOUSLY EVERY DAY     lidocaine (LIDODERM) 5 % Patch Apply patch to painful area for up to 12 h within a 24 h period.  Remove after 12 hours.     lisinopril (PRINIVIL/ZESTRIL) 2.5 MG tablet TAKE 1 TABLET (2.5 MG) BY MOUTH DAILY     metFORMIN (GLUCOPHAGE-XR) 500 MG 24 hr tablet TAKE 2 TABLETS BY MOUTH DAILY WITH DINNER     metoprolol succinate (TOPROL XL) 25 MG 24 hr tablet Take 0.5 tablets (12.5 mg) by mouth 2 times daily Take 50 mg by mouth in combination with 12.5 for a total of 62.5 twice a day     metoprolol succinate (TOPROL-XL) 100 MG 24 hr tablet Take 50 mg by mouth in combination with 12.5 for a total of 62.5 twice a day     montelukast (SINGULAIR) 10 MG tablet TAKE 1 TABLET BY MOUTH AT BEDTIME     NOVOLOG FLEXPEN 100 UNIT/ML soln INJECT 12 UNITS SUBCUTANEOUS 3 TIMES DAILY (WITH MEALS)     order for DME Equipment being ordered: Full face mask for CPAP - size: F10 large     order for DME Oxygen: Patient requires supplemental Oxygen 4 LPM via nasal canula with activity. Please provide patient with a home unit and with portability  capability. Oxygen will be for a lifetime.     potassium chloride SA (K-DUR/KLOR-CON M) 20 MEQ CR tablet Increase KCL to 40 MEQ in AM and 20 MEQ in the evening.     predniSONE (DELTASONE) 1 MG tablet Use with 5 mg tabs to taper: 10 mg and 9 mg every other day for 1m, 9 mg/day for 1m, 9 mg and 8 mg every other day for 1m, etc. until at 7mg     predniSONE (DELTASONE) 10 MG tablet Take 2 tablets for three days (4/12-14), then take 1 tablet daily (10 mg daily starting 4/15)     predniSONE (DELTASONE) 5 MG tablet Use with 1 mg tabs to taper: 10 mg and 9 mg every other day for 1m, 9 mg/day for 1m, 9 mg and 8 mg every other day for 1m, etc. until at 7mg     spironolactone (ALDACTONE) 25 MG tablet Take 2 tablets (50 mg) by mouth daily     torsemide (DEMADEX) 20 MG tablet Take 40 mg in the morning, 30 mg in the afternoon.     traZODone (DESYREL) 50 MG tablet TAKE 1/2-1 TABLET BY MOUTH NIGHTLY AS NEEDED, CAN TITRATE DOWN TO 1/2TAB OR UP TO 2TABS AS NEEDED     warfarin (COUMADIN) 7.5 MG tablet TAKE 1 TABLET BY MOUTH DAILY. CURRENT DOSE IS 7.5 MG DAILY. DOSE ADJUSTED PER INR RESULT.     No current facility-administered medications for this visit.              Past Medical History:     Past Medical History:   Diagnosis Date     Alcohol abuse, in remission      Allergic rhinitis, cause unspecified     allegra helps when she takes it     Antiplatelet or antithrombotic long-term use      Atrial fibrillation (H)     in hosp in 11/11 after surgery w/ fluid overload     Cardiomegaly     LVH on stress echo- cardiac w/u at [facility]- neg CT scan for PE, neg stress echo in 8/06     Chest pain, unspecified      Disorder of bone and cartilage, unspecified     osteopenia (had been on prempro), improved on 6/06 dexa, stable dexa 11/10     Diverticulosis of colon (without mention of hemorrhage)     last episode yrs ago     Essential hypertension, benign      Gastro-oesophageal reflux disease      Insomnia, unspecified     weaned off clonazepam      Irregular heart beat      Lumbago 7/09    MRI with [facility]AL, now seeing Dr. Cain for sciatic sx's     Major depressive disorder, recurrent episode, moderate (H)      Obstructive sleep apnea      Osteoarthrosis, unspecified whether generalized or localized, unspecified site      Sjogren's syndrome (H)      Sleep apnea      Tobacco use disorder     chantix in 9/07, started again in 6/08, working             Past Surgical History:     Past Surgical History:   Procedure Laterality Date     BACK SURGERY  1962     BIOPSY BREAST  9/27/02    Biopsy Left Breast     C APPENDECTOMY  1970's?     C NONSPECIFIC PROCEDURE  11/05    exploratory abd lap, adhesions, resolved RLQ pain, diverticulitis episodes     CARDIAC SURGERY       CHOLECYSTECTOMY  1990's?     COLECTOMY LEFT  11/7/2011    Procedure:COLECTOMY LEFT; Laparoscopic mobilization of splenic flexture, sigmoid colectomy, coloprotoscopy, loop illeostomy; Surgeon:CK CASTANEDA; Location:[facility] OR     HYSTERECTOMY TOTAL ABDOMINAL, BILATERAL SALPINGO-OOPHORECTOMY, COMBINED  11/7/2011    Procedure:COMBINED HYSTERECTOMY TOTAL ABDOMINAL, BILATERAL SALPINGO-OOPHORECTOMY; total abdominal hysterectomy, bilateral salpingo-oophorectomy; Surgeon:ALETA MANUEL; Location:[facility] OR     INSERT STENT URETER  11/7/2011    Procedure:INSERT STENT URETER; Placement of Bilateral Ureteral Stents ; Surgeon:PRANEETH BRYANT; Location:[facility] OR     SIGMOIDOSCOPY FLEXIBLE  11/3/2011    Procedure:SIGMOIDOSCOPY FLEXIBLE; Flexible Sigmoidoscopy; Surgeon:CK CASTANEDA; Location:[facility] OR     TAKEDOWN ILEOSTOMY  2/1/2012    Procedure:TAKEDOWN ILEOSTOMY; Takedown Loop Ileostomy ; Surgeon:CK CASTANEDA; Location:[facility] OR             Social History:     Social History     Social History     Marital status: Single     Spouse name: N/A     Number of children: 0     Years of education: Ed Spec De     Occupational History     Professor [employer]     Social  History Main Topics     Smoking status: Former Smoker     Packs/day: 0.50     Years: 10.00     Types: Cigarettes     Quit date: 8/1/2011     Smokeless tobacco: Never Used      Comment: 1/2 ppd     Alcohol use No      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     Drug use: No     Sexual activity: No     Other Topics Concern     Not on file     Social History Narrative                             Family History:     Family History   Problem Relation Age of Onset     C.A.D. Mother 63     MI- first at age 63     HEART DISEASE Mother      Hypertension Mother      Cerebrovascular Disease Mother      Hyperlipidemia Mother      Alcohol/Drug Father      Alzheimer Disease Father      Dementia Father      Hypertension Father      Hyperlipidemia Father      C.A.D. Sister 52     Minor MI- age 50's     HEART DISEASE Sister      Hypertension Sister      Hypertension Sister      Hypertension Brother      Cancer - colorectal Sister 48     Late 40's early 50's     Prostate Cancer Brother 74     Dx'd age 74     GASTROINTESTINAL DISEASE Sister      Diverticulitis     GASTROINTESTINAL DISEASE Brother      Diverticulitis     Lipids Sister      Lipids Sister      Parkinsonism Brother      Diabetes Sister      HEART DISEASE Sister      CHF     Cancer Sister      lung, smoker     Substance Abuse Sister      Substance Abuse Brother      Asthma Sister      Cancer Sister      Breast Cancer Daughter      Prostate Cancer Brother      Hyperlipidemia Brother              Immunization:     Immunization History   Administered Date(s) Administered     Influenza (High Dose) 3 valent vaccine 10/11/2013, 10/14/2014, 12/31/2015, 11/11/2016, 11/08/2017     Influenza (IIV3) PF 11/24/2003, 10/08/2004, 10/28/2005, 11/01/2006, 09/01/2010, 10/17/2011, 09/21/2012     Pneumo Conj 13-V (2010&after) 10/30/2015     Pneumococcal 23 valent 11/03/2010     TD (ADULT, 7+) 01/15/1993, 11/05/2002     TDAP Vaccine (Adacel) 11/03/2010     Zoster vaccine, live 10/05/2009               Review of Systems:   I have done 10 points of review systems and pertinent findings are as above , otherwise negative.             Physical Examination:   General: Alert, oriented, not in distress  B/P: 105/73, T: Data Unavailable, P: 88, R: 17  HEENT: neck supple, symmetrical, no lymph node enlargement, thyromegaly, bruit, JVD, pupils are isocoric and equally responsive to the light.   Lungs: both hemithoraces are symmetrical, normal to palpation, no dullness to percussion, auscultation of lungs revealed few wheezes  CVS: Normal S1 and S2, no additional heart sounds, murmur, rub, normal peripheral pulses  Abdomen: Bowel sounds present, soft, non tender, non distended, no organomegaly, ascitis, mass  Extremities/musculoskeletal: no peripheral edema, deformity, cyanosis, clubbing  Neurology: alert, orientedx3, no motor/sensorial deficits, cranial nerves grossly normal  Skin: no rash  Psychiatry: Mood and affect are appropriate             DATA:     CBC RESULTS:     Recent Labs   Lab Test  11/08/17   1432  10/06/17   1421   WBC  15.6*  11.9*   RBC  4.59  4.54   HGB  13.4  13.2   HCT  42.1  41.0   PLT  264  231       Basic Metabolic Panel:  Recent Labs   Lab Test  05/23/18   1028  04/18/18   1438   NA  137  137   POTASSIUM  3.5  4.3   CHLORIDE  103  101   CO2  24  24   BUN  19  32*   CR  0.90  0.90   GLC  131*  221*   CLAUDY  9.0  9.4       INR  Recent Labs   Lab Test  07/12/18 2008 07/06/18   INR  2.72*  1.6*        PFT   PFT Latest Ref Rng & Units 7/25/2018   FVC L 1.17   FEV1 L 0.92   FVC% % 42   FEV1% % 44         Thank you for allowing me participate in the care of Betty GRISELDA Tee.    Meño Walsh M.D.  Associate Professor of Medicine  Pulmonary, Allergy, Critical Care and Sleep

## 2018-07-25 NOTE — LETTER
7/25/2018       RE: Betty Tee  [address]  [facility] [address]     Dear Colleague,    Thank you for referring your patient, Betty Tee, to the [facility] SPORTS AND ORTHOPAEDIC WALK IN CLINIC at [facility] Please see a copy of my visit note below.          SPORTS & ORTHOPEDIC WALK-IN VISIT 7/25/2018    Primary Care Physician: Dr. Tristan    Fell onto cement onto left buttocks about three and a half weeks ago. Low back pain into left leg. Also now having neck pain since Sunday morning. Has tried flexeril which didn't help. Pulmonologist recommended and ordered CT scan for lumbar. Sent here to see if we would order anything else.     Reason for visit:     What part of your body is injured / painful?  left low back with radiculopathy, left side neck     What caused the injury /pain? Fall    How long ago did your injury occur or pain begin? about a month ago    What are your most bothersome symptoms? Pain    How would you characterize your symptom?  burning    What makes your symptoms better? Rest    What makes your symptoms worse? Standing and Walking    Have you been previously seen for this problem? Yes, ED    Medical History:    Any recent changes to your medical history? Yes, abdominal abscess    Any new medication prescribed since last visit? No    Have you had surgery on this body part before? Yes Date: 1962, cut off part of disc in 5th lumbar area    Social History:    Occupation:     Handedness: Right    Exercise: None    Review of Systems:    Do you have fever, chills, weight loss? No but has been sweating a lot lately    Do you have any vision problems? No, but needs an exam    Do you have any chest pain or edema? No    Do you have any shortness of breath or wheezing?  Uses cpap at night    Do you have stomach problems? Yes, abdominal abscess     Do you have any numbness or focal weakness? Yes, left leg    Do you have diabetes? Yes, type 2    Do  you have problems with bleeding or clotting? No, takes coumadin     Do you have an rashes or other skin lesions? Yes, taking medication            [facility]  Orthopedics  Charles Keyes MD  2018     Name: Betty Tee  MRN: [id number]  Age: 78 year old  : 1940  Referring provider: Referred Self     Chief Complaint: Low back pain and neck pain      Date of Injury: 18    History of Present Illness:   Betty Tee is a 78 year old female who presents today for evaluation of lower back pain and neck pain. On 18, she was carrying groceries and fell to her buttocks, particularly her left side. Since she has had low back symptoms with radiation down her lateral left leg that she describes as burning. She presented to the emergency department on 18 and had negative XR's obtained. She contacted her primary doctor who prescribed Flexeril, however, this has not been helpful. She had an appointment with her pulmonologist today who planned to obtain a chest CT, and decided to extend this to the back due to her pain. She has yet to obtain this imaging. Today, she reports that her pain is exacerbated with any movement or walking. She feels comfortable when sitting, though still has a component of left leg burning. Tylenol has minimally relieved her pain but she has not taken it consistently. She denies any numbness or tingling. She notes a history of L5 surgery in . She has had no fusions or hardware placed. She has no history of spinal injections. She is currently on prednisone chronically and has done many topical medications and does not feel she would like any more.     Review of Systems:   A 14-point review of systems was obtained and is negative except for as noted in the HPI.     Medications:     Current Outpatient Prescriptions:      ACCU-CHEK SHANNON PLUS test strip, USE TO TEST BLOOD SUGARS 4 TIMES DAILY OR AS DIRECTED, Disp: 400 strip, Rfl: 0     acetaminophen (TYLENOL) 500 MG  tablet, Take 500 mg by mouth nightly as needed , Disp: , Rfl:      acyclovir (ZOVIRAX) 400 MG tablet, Take 1 tablet (400 mg) by mouth 3 times daily For a couple days, Disp: 15 tablet, Rfl: 2     acyclovir (ZOVIRAX) 400 MG tablet, Take 400 mg by mouth See Admin Instructions 5 times daily as needed for outbreaks, Disp: , Rfl:      acyclovir (ZOVIRAX) 5 % ointment, Apply topically 6 times daily (Patient taking differently: Apply topically 6 times daily As needed for outbreaks), Disp: 15 g, Rfl: 3     albuterol (PROAIR HFA, PROVENTIL HFA, VENTOLIN HFA) 108 (90 BASE) MCG/ACT inhaler, Inhale 2 puffs into the lungs every 6 hours, Disp: 1 Inhaler, Rfl: 3     atorvastatin (LIPITOR) 10 MG tablet, Take 1 tablet (10 mg) by mouth daily, Disp: 90 tablet, Rfl: 0     BD JANE U/F 32G X 4 MM insulin pen needle, USE 4 DAILY AS DIRECTED., Disp: 400 each, Rfl: 0     blood glucose monitoring (ACCU-CHEK SHANNON PLUS) test strip, Use to test blood sugar 4 times daily or as directed.  Ok to substitute alternative if insurance prefers., Disp: 120 strip, Rfl: 11     blood glucose monitoring (ACCU-CHEK FASTCLIX) lancets, Use to test blood sugar 4 times daily or as directed.  Ok to substitute alternative if insurance prefers., Disp: 1 Box, Rfl: 11     blood glucose monitoring (ACCU-CHEK MULTICLIX) lancets, Use to test blood sugar 4 times daily or as directed., Disp: 4 Box, Rfl: 3     COMPRESSION STOCKINGS, Wear compression stockings in affected leg (right leg) or both legs most of the time during the day and take them off at night., Disp: 2 each, Rfl: 2     cyclobenzaprine (FLEXERIL) 10 MG tablet, Take 1 tablet (10 mg) by mouth nightly as needed for muscle spasms, Disp: 14 tablet, Rfl: 1     escitalopram (LEXAPRO) 20 MG tablet, TAKE 1 TABLET (20 MG) BY MOUTH DAILY, Disp: 30 tablet, Rfl: 1     fluticasone (FLONASE) 50 MCG/ACT nasal spray, Spray 1-2 sprays into both nostrils daily (Patient taking differently: Spray 1-2 sprays into both nostrils  daily as needed for allergies ), Disp: 16 g, Rfl: 5     fluticasone (FLOVENT HFA) 220 MCG/ACT inhaler, Inhale 2 puffs into the lungs 2 times daily, Disp: 3 Inhaler, Rfl: 3     insulin pen needle (B-D U/F) 31G X 8 MM, Use 4 times daily (with Lantus and Novolog) or as directed., Disp: 400 each, Rfl: 3     ketoconazole (NIZORAL) 2 % cream, Apply topically 2 times daily, Disp: 60 g, Rfl: 1     LANTUS SOLOSTAR 100 UNIT/ML soln, INJECT 25 UNTIS SUBCUTANEOUSLY EVERY DAY, Disp: 15 mL, Rfl: 0     lidocaine (LIDODERM) 5 % Patch, Apply patch to painful area for up to 12 h within a 24 h period.  Remove after 12 hours., Disp: 30 patch, Rfl: 0     lisinopril (PRINIVIL/ZESTRIL) 2.5 MG tablet, TAKE 1 TABLET (2.5 MG) BY MOUTH DAILY, Disp: 90 tablet, Rfl: 0     metFORMIN (GLUCOPHAGE-XR) 500 MG 24 hr tablet, TAKE 2 TABLETS BY MOUTH DAILY WITH DINNER, Disp: 180 tablet, Rfl: 0     metoprolol succinate (TOPROL XL) 25 MG 24 hr tablet, Take 0.5 tablets (12.5 mg) by mouth 2 times daily Take 50 mg by mouth in combination with 12.5 for a total of 62.5 twice a day, Disp: 30 tablet, Rfl: 3     metoprolol succinate (TOPROL-XL) 100 MG 24 hr tablet, Take 50 mg by mouth in combination with 12.5 for a total of 62.5 twice a day, Disp: 90 tablet, Rfl: 3     montelukast (SINGULAIR) 10 MG tablet, TAKE 1 TABLET BY MOUTH AT BEDTIME, Disp: 90 tablet, Rfl: 0     NOVOLOG FLEXPEN 100 UNIT/ML soln, INJECT 12 UNITS SUBCUTANEOUS 3 TIMES DAILY (WITH MEALS), Disp: 15 mL, Rfl: 0     order for DME, Equipment being ordered: Full face mask for CPAP - size: F10 large, Disp: 1 each, Rfl: 0     order for DME, Oxygen: Patient requires supplemental Oxygen 4 LPM via nasal canula with activity. Please provide patient with a home unit and with portability capability. Oxygen will be for a lifetime., Disp: 1 Device, Rfl: 0     potassium chloride SA (K-DUR/KLOR-CON M) 20 MEQ CR tablet, Increase KCL to 40 MEQ in AM and 20 MEQ in the evening., Disp: 180 tablet, Rfl: 3      predniSONE (DELTASONE) 1 MG tablet, Use with 5 mg tabs to taper: 10 mg and 9 mg every other day for 1m, 9 mg/day for 1m, 9 mg and 8 mg every other day for 1m, etc. until at 7mg, Disp: 120 tablet, Rfl: 6     predniSONE (DELTASONE) 10 MG tablet, Take 2 tablets for three days (4/12-14), then take 1 tablet daily (10 mg daily starting 4/15), Disp: 90 tablet, Rfl: 3     predniSONE (DELTASONE) 5 MG tablet, Use with 1 mg tabs to taper: 10 mg and 9 mg every other day for 1m, 9 mg/day for 1m, 9 mg and 8 mg every other day for 1m, etc. until at 7mg, Disp: 30 tablet, Rfl: 6     spironolactone (ALDACTONE) 25 MG tablet, Take 2 tablets (50 mg) by mouth daily, Disp: 180 tablet, Rfl: 3     torsemide (DEMADEX) 20 MG tablet, Take 40 mg in the morning, 30 mg in the afternoon., Disp: 105 tablet, Rfl: 3     traZODone (DESYREL) 50 MG tablet, TAKE 1/2-1 TABLET BY MOUTH NIGHTLY AS NEEDED, CAN TITRATE DOWN TO 1/2TAB OR UP TO 2TABS AS NEEDED, Disp: 180 tablet, Rfl: 0     warfarin (COUMADIN) 7.5 MG tablet, TAKE 1 TABLET BY MOUTH DAILY. CURRENT DOSE IS 7.5 MG DAILY. DOSE ADJUSTED PER INR RESULT., Disp: 90 tablet, Rfl: 3     alendronate (FOSAMAX) 70 MG tablet, Take 1 tablet (70 mg) by mouth every 7 days (Patient not taking: Reported on 7/25/2018), Disp: 4 tablet, Rfl: 11    Allergies:  Augmentin   Codeine  Phenobarbital   Seasonal allergies     Past Medical History:  Alcohol abuse, in remission   Antiplatelet or antithrombotic long term use   Atrial fibrillation   Cardiomegaly   Osteopenia   Diverticulosis of colon   Hypertension   GERD  Insomnia   Irregular heart beat   Lumbago   Major depressive disorder  ANGELICA  Osteoarthritis   Sjogren's syndrome   Tobacco use disorder    Past Surgical History:  Back surgery   Left breast biopsy   Appendectomy   Exploratory abdomen laparoscopic, adhesions, resolved RLQ pain   Cholecystectomy   Colectomy left   Cardiac surgery   GUNJAN BSO  Insert ureteral stent   Sigmoidoscopy flexible   Takedown ileostomy   "    Social History:  Presents with a female    Works as professor at [employer].   Former smoker   History of alcohol use disorder, in remission   No drug use      Family History:  Mother - CAD, hypertension, cerebrovascular disease, hyperlipidemia   Father - Alzheimer disease, hypertension, hyperlipidemia   Sister - CAD, hypertension, colorectal cancer  Brother  - hypertension, prostate cancer, parkinsonism   Daughter - breast cancer      Negative for bleeding or clotting disorders or adverse reactions to anesthesia.    Physical Examination:  Blood pressure 105/73, height 1.702 m (5' 7\"), weight 96.2 kg (212 lb), not currently breastfeeding.    General: alert, pleasant, no distress. sitting comfortably in chair  Back/Spine: no tenderness to palpation of spinous processes, or paraspinous musculature of lumbar spine. full ROM with flexion, extension, twisting, and side bending without pain. Straight leg raise positive  On left for pain in back but no radicular symptoms. Mild hamstring tightness noted. No pain in back with DURGA testing bilaterally.   Neuro: Strength testing performed while in a wheel chair. Strength 5/5 in dorsiflexion and plantar flexion of ankle and great toe. 5/5 strength on knee flexion and extension and hip flexion. SILT on bilateral lower extremities,  patellar and achilles reflexes 2+ and symmetric, babinski downgoing bilaterally .     Imaging:   No imaging     Assessment:   78 year old female with low back pain resulting from a fall to her buttocks on cement about 3 weeks ago.      Plan:   After discussion of her condition I explained her options going forward including MRI of the lumbar spine, injections, Prednisone burst, Tizanidine, Voltaren gel, formal physical therapy, and pain medication. We discussed the risks and benefits to each and the patient has elected to use Tylenol 1000 mg TID with her topical gel and begin formal physical therapy. The patient voiced " understanding of the plan going forward.     Charles Keyes MD    Scribe Disclosure:   I, Hipolito Kraft, am serving as a scribe to document services personally performed by Charles Keyes MD at this visit, based upon the provider's statements to me. All documentation has been reviewed by the aforementioned provider prior to being entered into the official medical record.     Portions of this medical record were completed by a scribe. UPON MY REVIEW AND AUTHENTICATION BY ELECTRONIC SIGNATURE, this confirms (a) I performed the applicable clinical services, and (b) the record is accurate.

## 2018-07-25 NOTE — MR AVS SNAPSHOT
After Visit Summary   7/25/2018    Betty Tee    MRN: [id number]           Patient Information     Date Of Birth          1940        Visit Information        Provider Department      7/25/2018 10:20 AM Charles Keyes MD [facility]        Today's Diagnoses     Acute left-sided low back pain with left-sided sciatica    -  1       Follow-ups after your visit        Additional Services     HUNG PT, HAND, AND CHIROPRACTIC REFERRAL       Physical Therapy Referral                  Your next 10 appointments already scheduled     Aug 17, 2018  1:00 PM CDT   Office Visit with Ilene Tristan MD   [facility] ([facility])    [address] [facility] [id number] [phone]           Bring a current list of meds and any records pertaining to this visit. For Physicals, please bring immunization records and any forms needing to be filled out. Please arrive 10 minutes early to complete paperwork.            Oct 17, 2018 12:00 PM CDT   Lab with  LAB   [facility] ([facility])    [address] [place] [address]  [address] [facility] [id number] [phone]            Oct 17, 2018 12:30 PM CDT   (Arrive by 12:15 PM)   CORE RETURN with LIZY Tyler CNP   [facility] ([facility])    [address] [place] [address]  [address] [place] [id number]   [phone]            Nov 01, 2018  8:00 AM CDT   FULL PULMONARY FUNCTION with  PFL B   [facility] Function Testing ([facility])    [address] [place] [address]  3rd Floor  [facility] [id number] [phone]            Nov 01, 2018  9:00 AM CDT   (Arrive by 8:45 AM)   Return Interstitial Lung with Maryjane Tillman MD   [facility] ([facility])    [address] [place] [address]  [address] [place] [id number] [phone]         "    Dec 06, 2018  3:00 PM CST   (Arrive by 2:45 PM)   Return Visit with Carmenza Stringer MD   [facility] ([facility])    [address] [facility]  [address]  [facility] [address] [phone]              Who to contact     Please call your clinic at [phone] to:    Ask questions about your health    Make or cancel appointments    Discuss your medicines    Learn about your test results    Speak to your doctor            Additional Information About Your Visit        Element [employer] Information     [employer] gives you secure access to your electronic health record. If you see a primary care provider, you can also send messages to your care team and make appointments. If you have questions, please call your primary care clinic.  If you do not have a primary care provider, please call [phone] and they will assist you.      [employer] is an electronic gateway that provides easy, online access to your medical records. With [employer], you can request a clinic appointment, read your test results, renew a prescription or communicate with your care team.     To access your existing account, please contact your [facility] or call [phone] for assistance.        Care EveryWhere ID     This is your Care EveryWhere ID. This could be used by other organizations to access your [facility] medical records  [id number]        Your Vitals Were     Height BMI (Body Mass Index)                5' 7\" (1.702 m) 33.2 kg/m2           Blood Pressure from Last 3 Encounters:   07/25/18 105/73   07/25/18 105/73   07/12/18 107/66    Weight from Last 3 Encounters:   07/25/18 212 lb (96.2 kg)   07/25/18 212 lb (96.2 kg)   07/12/18 212 lb (96.2 kg)              We Performed the Following     HUNG PT, HAND, AND CHIROPRACTIC REFERRAL          Today's Medication Changes          These changes are accurate as of 7/25/18 11:59 PM.  If you have any questions, ask your nurse or " doctor.               These medicines have changed or have updated prescriptions.        Dose/Directions    acyclovir 5 % ointment   Commonly known as:  ZOVIRAX   This may have changed:  additional instructions   Used for:  Recurrent cold sores        Apply topically 6 times daily   Quantity:  15 g   Refills:  3       fluticasone 50 MCG/ACT spray   Commonly known as:  FLONASE   This may have changed:    - when to take this  - reasons to take this   Used for:  Seasonal allergic rhinitis        Dose:  1-2 spray   Spray 1-2 sprays into both nostrils daily   Quantity:  16 g   Refills:  5                Primary Care Provider Office Phone # Fax #    Ilene Tristan [phone]       [address] [facility]  [address]  [facility] [address]        Equal Access to Services     GENNY STONER : Hadii jossue sellers hadasho Soomaali, waaxda luqadaha, qaybta kaalmada adeegyada, waxugo mercedes . So [facility] [phone].    ATENCIÓN: Si habla español, tiene a conti disposición servicios gratuitos de asistencia lingüística. [facility] [phone].    We comply with applicable federal civil rights laws and [place] laws. We do not discriminate on the basis of race, color, national origin, age, disability, sex, sexual orientation, or gender identity.            Thank you!     Thank you for choosing [facility]  for your care. Our goal is always to provide you with excellent care. Hearing back from our patients is one way we can continue to improve our services. Please take a few minutes to complete the written survey that you may receive in the mail after your visit with us. Thank you!             Your Updated Medication List - Protect others around you: Learn how to safely use, store and throw away your medicines at www.disposemymeds.org.          This list is accurate as of 7/25/18 11:59 PM.  Always use your most recent med list.                   Brand Name Dispense  Instructions for use Diagnosis    * acyclovir 400 MG tablet    ZOVIRAX     Take 400 mg by mouth See Admin Instructions 5 times daily as needed for outbreaks        * acyclovir 400 MG tablet    ZOVIRAX    15 tablet    Take 1 tablet (400 mg) by mouth 3 times daily For a couple days    Herpes labialis       acyclovir 5 % ointment    ZOVIRAX    15 g    Apply topically 6 times daily    Recurrent cold sores       albuterol 108 (90 Base) MCG/ACT Inhaler    PROAIR HFA/PROVENTIL HFA/VENTOLIN HFA    1 Inhaler    Inhale 2 puffs into the lungs every 6 hours    ILD (interstitial lung disease) (H)       alendronate 70 MG tablet    FOSAMAX    4 tablet    Take 1 tablet (70 mg) by mouth every 7 days    Other osteoporosis without current pathological fracture       atorvastatin 10 MG tablet    LIPITOR    90 tablet    Take 1 tablet (10 mg) by mouth daily    Hyperlipidemia LDL goal <100       * blood glucose monitoring lancets     4 Box    Use to test blood sugar 4 times daily or as directed.    Type 2 diabetes mellitus without complication, without long-term current use of insulin (H)       * blood glucose monitoring lancets     1 Box    Use to test blood sugar 4 times daily or as directed.  Ok to substitute alternative if insurance prefers.    Type 2 diabetes mellitus without complication, without long-term current use of insulin (H)       * blood glucose monitoring test strip    ACCU-CHEK SHANNON PLUS    120 strip    Use to test blood sugar 4 times daily or as directed.  Ok to substitute alternative if insurance prefers.    Type 2 diabetes mellitus without complication, without long-term current use of insulin (H)       * ACCU-CHEK SHANNON PLUS test strip   Generic drug:  blood glucose monitoring     400 strip    USE TO TEST BLOOD SUGARS 4 TIMES DAILY OR AS DIRECTED    Type 2 diabetes mellitus without complication, without long-term current use of insulin (H)       COMPRESSION STOCKINGS     2 each    Wear compression stockings in  affected leg (right leg) or both legs most of the time during the day and take them off at night.    DVT (deep venous thrombosis), right, Postphlebitic syndrome, Chronic anticoagulation, Atrial fibrillation (H)       cyclobenzaprine 10 MG tablet    FLEXERIL    14 tablet    Take 1 tablet (10 mg) by mouth nightly as needed for muscle spasms    Acute bilateral low back pain with left-sided sciatica       escitalopram 20 MG tablet    LEXAPRO    30 tablet    TAKE 1 TABLET (20 MG) BY MOUTH DAILY    Major depressive disorder, recurrent episode, moderate (H)       fluticasone 220 MCG/ACT Inhaler    FLOVENT HFA    3 Inhaler    Inhale 2 puffs into the lungs 2 times daily    ILD (interstitial lung disease) (H), Follicular bronchiolitis (H)       fluticasone 50 MCG/ACT spray    FLONASE    16 g    Spray 1-2 sprays into both nostrils daily    Seasonal allergic rhinitis       * insulin pen needle 31G X 8 MM    B-D U/F    400 each    Use 4 times daily (with Lantus and Novolog) or as directed.    Type 2 diabetes mellitus without complication, without long-term current use of insulin (H)       * BD JANE U/F 32G X 4 MM   Generic drug:  insulin pen needle     400 each    USE 4 DAILY AS DIRECTED.    Type 2 diabetes mellitus without complication, without long-term current use of insulin (H)       ketoconazole 2 % cream    NIZORAL    60 g    Apply topically 2 times daily    Cutaneous candidiasis       LANTUS SOLOSTAR 100 UNIT/ML injection   Generic drug:  insulin glargine     15 mL    INJECT 25 UNTIS SUBCUTANEOUSLY EVERY DAY    Type 2 diabetes mellitus with hyperglycemia, with long-term current use of insulin (H)       lidocaine 5 % Patch    LIDODERM    30 patch    Apply patch to painful area for up to 12 h within a 24 h period.  Remove after 12 hours.    Sacral back pain       lisinopril 2.5 MG tablet    PRINIVIL/Zestril    90 tablet    TAKE 1 TABLET (2.5 MG) BY MOUTH DAILY    Essential hypertension with goal blood pressure less than  140/90       metFORMIN 500 MG 24 hr tablet    GLUCOPHAGE-XR    180 tablet    TAKE 2 TABLETS BY MOUTH DAILY WITH DINNER    Type 2 diabetes mellitus without complication, without long-term current use of insulin (H)       * metoprolol succinate 100 MG 24 hr tablet    TOPROL-XL    90 tablet    Take 50 mg by mouth in combination with 12.5 for a total of 62.5 twice a day    Atrial fibrillation with controlled ventricular response (H)       * metoprolol succinate 25 MG 24 hr tablet    TOPROL XL    30 tablet    Take 0.5 tablets (12.5 mg) by mouth 2 times daily Take 50 mg by mouth in combination with 12.5 for a total of 62.5 twice a day    (HFpEF) heart failure with preserved ejection fraction (H), Persistent atrial fibrillation (H)       montelukast 10 MG tablet    SINGULAIR    90 tablet    TAKE 1 TABLET BY MOUTH AT BEDTIME    Sjogren's syndrome with lung involvement (H), ILD (interstitial lung disease) (H), Chronic seasonal allergic rhinitis due to other allergen       NovoLOG FLEXPEN 100 UNIT/ML injection   Generic drug:  insulin aspart     15 mL    INJECT 12 UNITS SUBCUTANEOUS 3 TIMES DAILY (WITH MEALS)    Type 2 diabetes mellitus with other specified complication (H)       * order for DME     1 Device    Oxygen: Patient requires supplemental Oxygen 4 LPM via nasal canula with activity. Please provide patient with a home unit and with portability capability. Oxygen will be for a lifetime.    Hypoxia, ILD (interstitial lung disease) (H)       * order for DME     1 each    Equipment being ordered: Full face mask for CPAP - size: F10 large    ANGELICA (obstructive sleep apnea)       potassium chloride SA 20 MEQ CR tablet    K-DUR/KLOR-CON M    180 tablet    Increase KCL to 40 MEQ in AM and 20 MEQ in the evening.    (HFpEF) heart failure with preserved ejection fraction (H)       * predniSONE 10 MG tablet    DELTASONE    90 tablet    Take 2 tablets for three days (4/12-14), then take 1 tablet daily (10 mg daily starting 4/15)     ILD (interstitial lung disease) (H), Sjogren's syndrome with lung involvement (H)       * predniSONE 1 MG tablet    DELTASONE    120 tablet    Use with 5 mg tabs to taper: 10 mg and 9 mg every other day for 1m, 9 mg/day for 1m, 9 mg and 8 mg every other day for 1m, etc. until at 7mg    Primary Sjogren's syndrome (H)       * predniSONE 5 MG tablet    DELTASONE    30 tablet    Use with 1 mg tabs to taper: 10 mg and 9 mg every other day for 1m, 9 mg/day for 1m, 9 mg and 8 mg every other day for 1m, etc. until at 7mg    Primary Sjogren's syndrome (H)       spironolactone 25 MG tablet    ALDACTONE    180 tablet    Take 2 tablets (50 mg) by mouth daily    Chronic diastolic congestive heart failure (H)       torsemide 20 MG tablet    DEMADEX    105 tablet    Take 40 mg in the morning, 30 mg in the afternoon.    (HFpEF) heart failure with preserved ejection fraction (H)       traZODone 50 MG tablet    DESYREL    180 tablet    TAKE 1/2-1 TABLET BY MOUTH NIGHTLY AS NEEDED, CAN TITRATE DOWN TO 1/2TAB OR UP TO 2TABS AS NEEDED    Insomnia due to medical condition       TYLENOL 500 MG tablet   Generic drug:  acetaminophen      Take 500 mg by mouth nightly as needed    Dizziness       warfarin 7.5 MG tablet    COUMADIN    90 tablet    TAKE 1 TABLET BY MOUTH DAILY. CURRENT DOSE IS 7.5 MG DAILY. DOSE ADJUSTED PER INR RESULT.    Atrial fibrillation with controlled ventricular response (H)       * Notice:  This list has 15 medication(s) that are the same as other medications prescribed for you. Read the directions carefully, and ask your doctor or other care provider to review them with you.

## 2018-07-25 NOTE — PROCEDURES
Dictation on: 07/25/2018  9:44 AM by: MEÑO WALSH [[email or web address]]     [facility]   Pulmonary Clinic Follow Up Note  July 25, 2018      Betty Tee MRN# [id number]   Age: 78 year old YOB: 1940     Date of Consultation: July 25, 2018    Primary care provider: Ilene Tristan     History taken from; Patient     Betty Tee is a 78 year old female seen in the Pulmonary Clinic  for f/u.  Chief Complaint   Patient presents with     RECHECK     F/U ILD   .          Pulmonary Problem List / Reason for follow up:   1. ILD  2. KAMILAH           Assessment and Plan:             Return visit in 3 months      Meño Walsh M.D.         History of Present Illness / Interval History:                     Pulmonary Data:         Exposure history: Asbestos;  No , TB;  No , Radiation;   No          Medications:     Current Outpatient Prescriptions   Medication Sig     ACCU-CHEK SHANNON PLUS test strip USE TO TEST BLOOD SUGARS 4 TIMES DAILY OR AS DIRECTED     acetaminophen (TYLENOL) 500 MG tablet Take 500 mg by mouth nightly as needed      acyclovir (ZOVIRAX) 400 MG tablet Take 1 tablet (400 mg) by mouth 3 times daily For a couple days     acyclovir (ZOVIRAX) 400 MG tablet Take 400 mg by mouth See Admin Instructions 5 times daily as needed for outbreaks     acyclovir (ZOVIRAX) 5 % ointment Apply topically 6 times daily (Patient taking differently: Apply topically 6 times daily As needed for outbreaks)     albuterol (PROAIR HFA, PROVENTIL HFA, VENTOLIN HFA) 108 (90 BASE) MCG/ACT inhaler Inhale 2 puffs into the lungs every 6 hours     alendronate (FOSAMAX) 70 MG tablet Take 1 tablet (70 mg) by mouth every 7 days     atorvastatin (LIPITOR) 10 MG tablet Take 1 tablet (10 mg) by mouth daily     BD JANE U/F 32G X 4 MM insulin pen needle USE 4 DAILY AS DIRECTED.     blood glucose monitoring (ACCU-CHEK SHANNON PLUS) test strip Use to test blood sugar 4 times daily or as directed.  Ok to  substitute alternative if insurance prefers.     blood glucose monitoring (ACCU-CHEK FASTCLIX) lancets Use to test blood sugar 4 times daily or as directed.  Ok to substitute alternative if insurance prefers.     blood glucose monitoring (ACCU-CHEK MULTICLIX) lancets Use to test blood sugar 4 times daily or as directed.     COMPRESSION STOCKINGS Wear compression stockings in affected leg (right leg) or both legs most of the time during the day and take them off at night.     cyclobenzaprine (FLEXERIL) 10 MG tablet Take 1 tablet (10 mg) by mouth nightly as needed for muscle spasms     escitalopram (LEXAPRO) 20 MG tablet TAKE 1 TABLET (20 MG) BY MOUTH DAILY     fluticasone (FLONASE) 50 MCG/ACT nasal spray Spray 1-2 sprays into both nostrils daily (Patient taking differently: Spray 1-2 sprays into both nostrils daily as needed for allergies )     fluticasone (FLOVENT HFA) 220 MCG/ACT inhaler Inhale 2 puffs into the lungs 2 times daily     insulin pen needle (B-D U/F) 31G X 8 MM Use 4 times daily (with Lantus and Novolog) or as directed.     ketoconazole (NIZORAL) 2 % cream Apply topically 2 times daily     LANTUS SOLOSTAR 100 UNIT/ML soln INJECT 25 UNTIS SUBCUTANEOUSLY EVERY DAY     lidocaine (LIDODERM) 5 % Patch Apply patch to painful area for up to 12 h within a 24 h period.  Remove after 12 hours.     lisinopril (PRINIVIL/ZESTRIL) 2.5 MG tablet TAKE 1 TABLET (2.5 MG) BY MOUTH DAILY     metFORMIN (GLUCOPHAGE-XR) 500 MG 24 hr tablet TAKE 2 TABLETS BY MOUTH DAILY WITH DINNER     metoprolol succinate (TOPROL XL) 25 MG 24 hr tablet Take 0.5 tablets (12.5 mg) by mouth 2 times daily Take 50 mg by mouth in combination with 12.5 for a total of 62.5 twice a day     metoprolol succinate (TOPROL-XL) 100 MG 24 hr tablet Take 50 mg by mouth in combination with 12.5 for a total of 62.5 twice a day     montelukast (SINGULAIR) 10 MG tablet TAKE 1 TABLET BY MOUTH AT BEDTIME     NOVOLOG FLEXPEN 100 UNIT/ML soln INJECT 12 UNITS  SUBCUTANEOUS 3 TIMES DAILY (WITH MEALS)     order for DME Equipment being ordered: Full face mask for CPAP - size: F10 large     order for DME Oxygen: Patient requires supplemental Oxygen 4 LPM via nasal canula with activity. Please provide patient with a home unit and with portability capability. Oxygen will be for a lifetime.     potassium chloride SA (K-DUR/KLOR-CON M) 20 MEQ CR tablet Increase KCL to 40 MEQ in AM and 20 MEQ in the evening.     predniSONE (DELTASONE) 1 MG tablet Use with 5 mg tabs to taper: 10 mg and 9 mg every other day for 1m, 9 mg/day for 1m, 9 mg and 8 mg every other day for 1m, etc. until at 7mg     predniSONE (DELTASONE) 10 MG tablet Take 2 tablets for three days (4/12-14), then take 1 tablet daily (10 mg daily starting 4/15)     predniSONE (DELTASONE) 5 MG tablet Use with 1 mg tabs to taper: 10 mg and 9 mg every other day for 1m, 9 mg/day for 1m, 9 mg and 8 mg every other day for 1m, etc. until at 7mg     spironolactone (ALDACTONE) 25 MG tablet Take 2 tablets (50 mg) by mouth daily     torsemide (DEMADEX) 20 MG tablet Take 40 mg in the morning, 30 mg in the afternoon.     traZODone (DESYREL) 50 MG tablet TAKE 1/2-1 TABLET BY MOUTH NIGHTLY AS NEEDED, CAN TITRATE DOWN TO 1/2TAB OR UP TO 2TABS AS NEEDED     warfarin (COUMADIN) 7.5 MG tablet TAKE 1 TABLET BY MOUTH DAILY. CURRENT DOSE IS 7.5 MG DAILY. DOSE ADJUSTED PER INR RESULT.     No current facility-administered medications for this visit.              Past Medical History:     Past Medical History:   Diagnosis Date     Alcohol abuse, in remission      Allergic rhinitis, cause unspecified     allegra helps when she takes it     Antiplatelet or antithrombotic long-term use      Atrial fibrillation (H)     in hosp in 11/11 after surgery w/ fluid overload     Cardiomegaly     LVH on stress echo- cardiac w/u at [facility] ER- neg CT scan for PE, neg stress echo in 8/06     Chest pain, unspecified      Disorder of bone and cartilage, unspecified      osteopenia (had been on prempro), improved on 6/06 dexa, stable dexa 11/10     Diverticulosis of colon (without mention of hemorrhage)     last episode yrs ago     Essential hypertension, benign      Gastro-oesophageal reflux disease      Insomnia, unspecified     weaned off clonazepam     Irregular heart beat      Lumbago 7/09    MRI with DJD, now seeing Dr. Cain for sciatic sx's     Major depressive disorder, recurrent episode, moderate (H)      Obstructive sleep apnea      Osteoarthrosis, unspecified whether generalized or localized, unspecified site      Sjogren's syndrome (H)      Sleep apnea      Tobacco use disorder     chantix in 9/07, started again in 6/08, working             Past Surgical History:     Past Surgical History:   Procedure Laterality Date     BACK SURGERY  1962     BIOPSY BREAST  9/27/02    Biopsy Left Breast     C APPENDECTOMY  1970's?     C NONSPECIFIC PROCEDURE  11/05    exploratory abd lap, adhesions, resolved RLQ pain, diverticulitis episodes     CARDIAC SURGERY       CHOLECYSTECTOMY  1990's?     COLECTOMY LEFT  11/7/2011    Procedure:COLECTOMY LEFT; Laparoscopic mobilization of splenic flexture, sigmoid colectomy, coloprotoscopy, loop illeostomy; Surgeon:CK CASTANEDA; Location:[facility] OR     HYSTERECTOMY TOTAL ABDOMINAL, BILATERAL SALPINGO-OOPHORECTOMY, COMBINED  11/7/2011    Procedure:COMBINED HYSTERECTOMY TOTAL ABDOMINAL, BILATERAL SALPINGO-OOPHORECTOMY; total abdominal hysterectomy, bilateral salpingo-oophorectomy; Surgeon:ALETA MANUEL; Location:[facility]     INSERT STENT URETER  11/7/2011    Procedure:INSERT STENT URETER; Placement of Bilateral Ureteral Stents ; Surgeon:PRANEETH BRYANT; Location:[facility]     SIGMOIDOSCOPY FLEXIBLE  11/3/2011    Procedure:SIGMOIDOSCOPY FLEXIBLE; Flexible Sigmoidoscopy; Surgeon:CK CASTANEDA; Location:[facility]     TAKEDOWN ILEOSTOMY  2/1/2012    Procedure:TAKEDOWN ILEOSTOMY; Takedown Loop Ileostomy ; Surgeon:CK CASTANEDA; Location:[facility] OR              Social History:     Social History     Social History     Marital status: Single     Spouse name: N/A     Number of children: 0     Years of education: Ed Spec De     Occupational History     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 Mother 63     MI- first at age 63     HEART DISEASE Mother      Hypertension Mother      Cerebrovascular Disease Mother      Hyperlipidemia Mother      Alcohol/Drug Father      Alzheimer Disease Father      Dementia Father      Hypertension Father      Hyperlipidemia Father      C.A.D. Sister 52     Minor MI- age 50's     HEART DISEASE Sister      Hypertension Sister      Hypertension Sister      Hypertension Brother      Cancer - colorectal Sister 48     Late 40's early 50's     Prostate Cancer Brother 74     Dx'd age 74     GASTROINTESTINAL DISEASE Sister      Diverticulitis     GASTROINTESTINAL DISEASE Brother      Diverticulitis     Lipids Sister      Lipids Sister      Parkinsonism Brother      Diabetes Sister      HEART DISEASE Sister      CHF     Cancer Sister      lung, smoker     Substance Abuse Sister      Substance Abuse Brother      Asthma Sister      Cancer Sister      Breast Cancer Daughter      Prostate Cancer Brother      Hyperlipidemia Brother              Immunization:     Immunization History   Administered Date(s) Administered     Influenza (High Dose) 3 valent vaccine 10/11/2013, 10/14/2014, 12/31/2015, 11/11/2016, 11/08/2017     Influenza (IIV3) PF  11/24/2003, 10/08/2004, 10/28/2005, 11/01/2006, 09/01/2010, 10/17/2011, 09/21/2012     Pneumo Conj 13-V (2010&after) 10/30/2015     Pneumococcal 23 valent 11/03/2010     TD (ADULT, 7+) 01/15/1993, 11/05/2002     TDAP Vaccine (Adacel) 11/03/2010     Zoster vaccine, live 10/05/2009              Review of Systems:   I have done 10 points of review systems and pertinent findings are as above , otherwise negative.             Physical Examination:   General: Alert, oriented, not in distress  B/P: 105/73, T: Data Unavailable, P: 88, R: 17  HEENT: neck supple, symmetrical, no lymph node enlargement, thyromegaly, bruit, JVD, pupils are isocoric and equally responsive to the light.   Lungs: both hemithoraces are symmetrical, normal to palpation, no dullness to percussion, auscultation of lungs revealed few wheezes  CVS: Normal S1 and S2, no additional heart sounds, murmur, rub, normal peripheral pulses  Abdomen: Bowel sounds present, soft, non tender, non distended, no organomegaly, ascitis, mass  Extremities/musculoskeletal: no peripheral edema, deformity, cyanosis, clubbing  Neurology: alert, orientedx3, no motor/sensorial deficits, cranial nerves grossly normal  Skin: no rash  Psychiatry: Mood and affect are appropriate             DATA:     CBC RESULTS:     Recent Labs   Lab Test  11/08/17   1432  10/06/17   1421   WBC  15.6*  11.9*   RBC  4.59  4.54   HGB  13.4  13.2   HCT  42.1  41.0   PLT  264  231       Basic Metabolic Panel:  Recent Labs   Lab Test  05/23/18   1028  04/18/18   1438   NA  137  137   POTASSIUM  3.5  4.3   CHLORIDE  103  101   CO2  24  24   BUN  19  32*   CR  0.90  0.90   GLC  131*  221*   CLAUDY  9.0  9.4       INR  Recent Labs   Lab Test  07/12/18 2008 07/06/18   INR  2.72*  1.6*        PFT   PFT Latest Ref Rng & Units 7/25/2018   FVC L 1.17   FEV1 L 0.92   FVC% % 42   FEV1% % 44         Thank you for allowing me participate in the care of Betty Tee.    Meño Walsh M.D.   of  Medicine  Pulmonary, Allergy, Critical Care and Sleep

## 2018-07-25 NOTE — PROGRESS NOTES
SPORTS & ORTHOPEDIC WALK-IN VISIT 7/25/2018    Primary Care Physician: Dr. Tristan    Fell onto cement onto left buttocks about three and a half weeks ago. Low back pain into left leg. Also now having neck pain since Sunday morning. Has tried flexeril which didn't help. Pulmonologist recommended and ordered CT scan for lumbar. Sent here to see if we would order anything else.     Reason for visit:     What part of your body is injured / painful?  left low back with radiculopathy, left side neck     What caused the injury /pain? Fall    How long ago did your injury occur or pain begin? about a month ago    What are your most bothersome symptoms? Pain    How would you characterize your symptom?  burning    What makes your symptoms better? Rest    What makes your symptoms worse? Standing and Walking    Have you been previously seen for this problem? Yes, ED    Medical History:    Any recent changes to your medical history? Yes, abdominal abscess    Any new medication prescribed since last visit? No    Have you had surgery on this body part before? Yes Date: 1962, cut off part of disc in 5th lumbar area    Social History:    Occupation:     Handedness: Right    Exercise: None    Review of Systems:    Do you have fever, chills, weight loss? No but has been sweating a lot lately    Do you have any vision problems? No, but needs an exam    Do you have any chest pain or edema? No    Do you have any shortness of breath or wheezing?  Uses cpap at night    Do you have stomach problems? Yes, abdominal abscess     Do you have any numbness or focal weakness? Yes, left leg    Do you have diabetes? Yes, type 2    Do you have problems with bleeding or clotting? No, takes coumadin     Do you have an rashes or other skin lesions? Yes, taking medication

## 2018-07-25 NOTE — LETTER
7/25/2018       RE: Betty Tee  [address]  [facility] [address]     Dear Colleague,    Thank you for referring your patient, Betty Tee, to the [facility] at [facility]. Please see a copy of my visit note below.         [facility]   Pulmonary Clinic Follow Up Note  July 25, 2018      Betty Tee MRN# [id number]   Age: 78 year old YOB: 1940     Date of Consultation: July 25, 2018    Primary care provider: Ilene Tristan     History taken from; Patient     Betty Tee is a 78 year old female seen in the Pulmonary Clinic  for f/u.  Chief Complaint   Patient presents with     RECHECK     F/U ILD   .          Pulmonary Problem List / Reason for follow up:   1. ILD  2. KAMILAH           Assessment and Plan:        ASSESSMENT AND PLAN:  The patient is a 78-year-old lady with history of interstitial lung disease and bronchiolitis obliterans associated with Sjogren disease, coming for followup.  The patient is worse according to the pulmonary function test.   1.  Interstitial lung disease.  We will continue with 10 mg of the prednisone.  We will repeat a CT of the chest because of the patient's worse pulmonary function tests, although we cannot rule out the possibility that this was significantly affected by the back injury.  Patient did not notice any injury.   2.  Bronchiolitis obliterans.  We will continue with albuterol and Flovent.  Auscultatory the patient has some minimal wheezing in the right upper lobe.  Otherwise, the patient is stable.        We will also refer the patient to the Ortho Clinic.  We will do the CT of the spine and a CT of the chest.  The patient will follow up with another pulmonary function test in 3 months.      We explained the plan to the patient including the risks and benefits.  The patient expressed understanding and agreed with the  plan.      Thank you very much for the opportunity to participate in the care of this very pleasant patient.         Return visit in 3 months      Meño Walsh M.D.         History of Present Illness / Interval History:     CHIEF COMPLAINT:  Interstitial lung disease and bronchiolitis obliterans.      HISTORY OF PRESENT ILLNESS:  The patient is a 78-year-old lady who recently had a trauma with a fall.  The patient had quite a significant fall with back pain and that has certainly diminished her mobility.  The patient is coming for followup and she denies shortness of breath; however, she also has very limited mobility.  The pulmonary function tests unfortunately reveal a significant decline in FVC and FEV1, although patient did not notice any worsening of symptoms.                Pulmonary Data:         Exposure history: Asbestos;  No , TB;  No , Radiation;   No          Medications:     Current Outpatient Prescriptions   Medication Sig     ACCU-CHEK SHANNON PLUS test strip USE TO TEST BLOOD SUGARS 4 TIMES DAILY OR AS DIRECTED     acetaminophen (TYLENOL) 500 MG tablet Take 500 mg by mouth nightly as needed      acyclovir (ZOVIRAX) 400 MG tablet Take 1 tablet (400 mg) by mouth 3 times daily For a couple days     acyclovir (ZOVIRAX) 400 MG tablet Take 400 mg by mouth See Admin Instructions 5 times daily as needed for outbreaks     acyclovir (ZOVIRAX) 5 % ointment Apply topically 6 times daily (Patient taking differently: Apply topically 6 times daily As needed for outbreaks)     albuterol (PROAIR HFA, PROVENTIL HFA, VENTOLIN HFA) 108 (90 BASE) MCG/ACT inhaler Inhale 2 puffs into the lungs every 6 hours     alendronate (FOSAMAX) 70 MG tablet Take 1 tablet (70 mg) by mouth every 7 days     atorvastatin (LIPITOR) 10 MG tablet Take 1 tablet (10 mg) by mouth daily     BD JANE U/F 32G X 4 MM insulin pen needle USE 4 DAILY AS DIRECTED.     blood glucose monitoring (ACCU-CHEK SHANNON PLUS) test strip Use to test blood sugar 4  times daily or as directed.  Ok to substitute alternative if insurance prefers.     blood glucose monitoring (ACCU-CHEK FASTCLIX) lancets Use to test blood sugar 4 times daily or as directed.  Ok to substitute alternative if insurance prefers.     blood glucose monitoring (ACCU-CHEK MULTICLIX) lancets Use to test blood sugar 4 times daily or as directed.     COMPRESSION STOCKINGS Wear compression stockings in affected leg (right leg) or both legs most of the time during the day and take them off at night.     cyclobenzaprine (FLEXERIL) 10 MG tablet Take 1 tablet (10 mg) by mouth nightly as needed for muscle spasms     escitalopram (LEXAPRO) 20 MG tablet TAKE 1 TABLET (20 MG) BY MOUTH DAILY     fluticasone (FLONASE) 50 MCG/ACT nasal spray Spray 1-2 sprays into both nostrils daily (Patient taking differently: Spray 1-2 sprays into both nostrils daily as needed for allergies )     fluticasone (FLOVENT HFA) 220 MCG/ACT inhaler Inhale 2 puffs into the lungs 2 times daily     insulin pen needle (B-D U/F) 31G X 8 MM Use 4 times daily (with Lantus and Novolog) or as directed.     ketoconazole (NIZORAL) 2 % cream Apply topically 2 times daily     LANTUS SOLOSTAR 100 UNIT/ML soln INJECT 25 UNTIS SUBCUTANEOUSLY EVERY DAY     lidocaine (LIDODERM) 5 % Patch Apply patch to painful area for up to 12 h within a 24 h period.  Remove after 12 hours.     lisinopril (PRINIVIL/ZESTRIL) 2.5 MG tablet TAKE 1 TABLET (2.5 MG) BY MOUTH DAILY     metFORMIN (GLUCOPHAGE-XR) 500 MG 24 hr tablet TAKE 2 TABLETS BY MOUTH DAILY WITH DINNER     metoprolol succinate (TOPROL XL) 25 MG 24 hr tablet Take 0.5 tablets (12.5 mg) by mouth 2 times daily Take 50 mg by mouth in combination with 12.5 for a total of 62.5 twice a day     metoprolol succinate (TOPROL-XL) 100 MG 24 hr tablet Take 50 mg by mouth in combination with 12.5 for a total of 62.5 twice a day     montelukast (SINGULAIR) 10 MG tablet TAKE 1 TABLET BY MOUTH AT BEDTIME     NOVOLOG FLEXPEN 100  UNIT/ML soln INJECT 12 UNITS SUBCUTANEOUS 3 TIMES DAILY (WITH MEALS)     order for DME Equipment being ordered: Full face mask for CPAP - size: F10 large     order for DME Oxygen: Patient requires supplemental Oxygen 4 LPM via nasal canula with activity. Please provide patient with a home unit and with portability capability. Oxygen will be for a lifetime.     potassium chloride SA (K-DUR/KLOR-CON M) 20 MEQ CR tablet Increase KCL to 40 MEQ in AM and 20 MEQ in the evening.     predniSONE (DELTASONE) 1 MG tablet Use with 5 mg tabs to taper: 10 mg and 9 mg every other day for 1m, 9 mg/day for 1m, 9 mg and 8 mg every other day for 1m, etc. until at 7mg     predniSONE (DELTASONE) 10 MG tablet Take 2 tablets for three days (4/12-14), then take 1 tablet daily (10 mg daily starting 4/15)     predniSONE (DELTASONE) 5 MG tablet Use with 1 mg tabs to taper: 10 mg and 9 mg every other day for 1m, 9 mg/day for 1m, 9 mg and 8 mg every other day for 1m, etc. until at 7mg     spironolactone (ALDACTONE) 25 MG tablet Take 2 tablets (50 mg) by mouth daily     torsemide (DEMADEX) 20 MG tablet Take 40 mg in the morning, 30 mg in the afternoon.     traZODone (DESYREL) 50 MG tablet TAKE 1/2-1 TABLET BY MOUTH NIGHTLY AS NEEDED, CAN TITRATE DOWN TO 1/2TAB OR UP TO 2TABS AS NEEDED     warfarin (COUMADIN) 7.5 MG tablet TAKE 1 TABLET BY MOUTH DAILY. CURRENT DOSE IS 7.5 MG DAILY. DOSE ADJUSTED PER INR RESULT.     No current facility-administered medications for this visit.              Past Medical History:     Past Medical History:   Diagnosis Date     Alcohol abuse, in remission      Allergic rhinitis, cause unspecified     allegra helps when she takes it     Antiplatelet or antithrombotic long-term use      Atrial fibrillation (H)     in hosp in 11/11 after surgery w/ fluid overload     Cardiomegaly     LVH on stress echo- cardiac w/u at [facility] ER- neg CT scan for PE, neg stress echo in 8/06     Chest pain, unspecified      Disorder of bone  and cartilage, unspecified     osteopenia (had been on prempro), improved on 6/06 dexa, stable dexa 11/10     Diverticulosis of colon (without mention of hemorrhage)     last episode yrs ago     Essential hypertension, benign      Gastro-oesophageal reflux disease      Insomnia, unspecified     weaned off clonazepam     Irregular heart beat      Lumbago 7/09    MRI with DJD, now seeing Dr. Cain for sciatic sx's     Major depressive disorder, recurrent episode, moderate (H)      Obstructive sleep apnea      Osteoarthrosis, unspecified whether generalized or localized, unspecified site      Sjogren's syndrome (H)      Sleep apnea      Tobacco use disorder     chantix in 9/07, started again in 6/08, working             Past Surgical History:     Past Surgical History:   Procedure Laterality Date     BACK SURGERY  1962     BIOPSY BREAST  9/27/02    Biopsy Left Breast     C APPENDECTOMY  1970's?     C NONSPECIFIC PROCEDURE  11/05    exploratory abd lap, adhesions, resolved RLQ pain, diverticulitis episodes     CARDIAC SURGERY       CHOLECYSTECTOMY  1990's?     COLECTOMY LEFT  11/7/2011    Procedure:COLECTOMY LEFT; Laparoscopic mobilization of splenic flexture, sigmoid colectomy, coloprotoscopy, loop illeostomy; Surgeon:CK CASTANEDA; Location:[facility] OR     HYSTERECTOMY TOTAL ABDOMINAL, BILATERAL SALPINGO-OOPHORECTOMY, COMBINED  11/7/2011    Procedure:COMBINED HYSTERECTOMY TOTAL ABDOMINAL, BILATERAL SALPINGO-OOPHORECTOMY; total abdominal hysterectomy, bilateral salpingo-oophorectomy; Surgeon:ALETA MANUEL; Location:[facility] OR     INSERT STENT URETER  11/7/2011    Procedure:INSERT STENT URETER; Placement of Bilateral Ureteral Stents ; Surgeon:PRANEETH BRYANT; Location:[facility] OR     SIGMOIDOSCOPY FLEXIBLE  11/3/2011    Procedure:SIGMOIDOSCOPY FLEXIBLE; Flexible Sigmoidoscopy; Surgeon:CK CASTANEDA; Location:[facility] OR     TAKEDOWN ILEOSTOMY  2/1/2012    Procedure:TAKEDOWN ILEOSTOMY; Takedown Loop Ileostomy ; Surgeon:CK CASTANEDA  NICO; Location: OR             Social History:     Social History     Social History     Marital status: Single     Spouse name: N/A     Number of children: 0     Years of education: Ed Spec De     Occupational History     Professor [employer]     Social History Main Topics     Smoking status: Former Smoker     Packs/day: 0.50     Years: 10.00     Types: Cigarettes     Quit date: 8/1/2011     Smokeless tobacco: Never Used      Comment: 1/2 ppd     Alcohol use No      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     Drug use: No     Sexual activity: No     Other Topics Concern     Not on file     Social History Narrative                             Family History:     Family History   Problem Relation Age of Onset     C.A.D. Mother 63     MI- first at age 63     HEART DISEASE Mother      Hypertension Mother      Cerebrovascular Disease Mother      Hyperlipidemia Mother      Alcohol/Drug Father      Alzheimer Disease Father      Dementia Father      Hypertension Father      Hyperlipidemia Father      C.A.D. Sister 52     Minor MI- age 50's     HEART DISEASE Sister      Hypertension Sister      Hypertension Sister      Hypertension Brother      Cancer - colorectal Sister 48     Late 40's early 50's     Prostate Cancer Brother 74     Dx'd age 74     GASTROINTESTINAL DISEASE Sister      Diverticulitis     GASTROINTESTINAL DISEASE Brother      Diverticulitis     Lipids Sister      Lipids Sister      Parkinsonism Brother      Diabetes Sister      HEART DISEASE Sister      CHF     Cancer Sister      lung, smoker     Substance Abuse Sister      Substance Abuse Brother      Asthma Sister      Cancer Sister      Breast Cancer Daughter      Prostate Cancer Brother      Hyperlipidemia Brother              Immunization:     Immunization History   Administered Date(s) Administered     Influenza (High Dose) 3 valent vaccine 10/11/2013, 10/14/2014, 12/31/2015, 11/11/2016,  11/08/2017     Influenza (IIV3) PF 11/24/2003, 10/08/2004, 10/28/2005, 11/01/2006, 09/01/2010, 10/17/2011, 09/21/2012     Pneumo Conj 13-V (2010&after) 10/30/2015     Pneumococcal 23 valent 11/03/2010     TD (ADULT, 7+) 01/15/1993, 11/05/2002     TDAP Vaccine (Adacel) 11/03/2010     Zoster vaccine, live 10/05/2009              Review of Systems:   I have done 10 points of review systems and pertinent findings are as above , otherwise negative.             Physical Examination:   General: Alert, oriented, not in distress  B/P: 105/73, T: Data Unavailable, P: 88, R: 17  HEENT: neck supple, symmetrical, no lymph node enlargement, thyromegaly, bruit, JVD, pupils are isocoric and equally responsive to the light.   Lungs: both hemithoraces are symmetrical, normal to palpation, no dullness to percussion, auscultation of lungs revealed few wheezes  CVS: Normal S1 and S2, no additional heart sounds, murmur, rub, normal peripheral pulses  Abdomen: Bowel sounds present, soft, non tender, non distended, no organomegaly, ascitis, mass  Extremities/musculoskeletal: no peripheral edema, deformity, cyanosis, clubbing  Neurology: alert, orientedx3, no motor/sensorial deficits, cranial nerves grossly normal  Skin: no rash  Psychiatry: Mood and affect are appropriate             DATA:     CBC RESULTS:     Recent Labs   Lab Test  11/08/17   1432  10/06/17   1421   WBC  15.6*  11.9*   RBC  4.59  4.54   HGB  13.4  13.2   HCT  42.1  41.0   PLT  264  231       Basic Metabolic Panel:  Recent Labs   Lab Test  05/23/18   1028  04/18/18   1438   NA  137  137   POTASSIUM  3.5  4.3   CHLORIDE  103  101   CO2  24  24   BUN  19  32*   CR  0.90  0.90   GLC  131*  221*   CLAUDY  9.0  9.4       INR  Recent Labs   Lab Test  07/12/18 2008 07/06/18   INR  2.72*  1.6*        PFT   PFT Latest Ref Rng & Units 7/25/2018   FVC L 1.17   FEV1 L 0.92   FVC% % 42   FEV1% % 44         Thank you for allowing me participate in the care of Betty Tee.    Meño  ANSHUL Walsh.  Associate Professor of Medicine  Pulmonary, Allergy, Critical Care and Sleep

## 2018-07-25 NOTE — PROGRESS NOTES
[facility]  Orthopedics  Charles Keyes MD  2018     Name: Betty Tee  MRN: [id number]  Age: 78 year old  : 1940  Referring provider: Referred Self     Chief Complaint: Low back pain and neck pain      Date of Injury: 18    History of Present Illness:   Betty Tee is a 78 year old female who presents today for evaluation of lower back pain and neck pain. On 18, she was carrying groceries and fell to her buttocks, particularly her left side. Since she has had low back symptoms with radiation down her lateral left leg that she describes as burning. She presented to the emergency department on 18 and had negative XR's obtained. She contacted her primary doctor who prescribed Flexeril, however, this has not been helpful. She had an appointment with her pulmonologist today who planned to obtain a chest CT, and decided to extend this to the back due to her pain. She has yet to obtain this imaging. Today, she reports that her pain is exacerbated with any movement or walking. She feels comfortable when sitting, though still has a component of left leg burning. Tylenol has minimally relieved her pain but she has not taken it consistently. She denies any numbness or tingling. She notes a history of L5 surgery in . She has had no fusions or hardware placed. She has no history of spinal injections. She is currently on prednisone chronically and has done many topical medications and does not feel she would like any more.     Review of Systems:   A 14-point review of systems was obtained and is negative except for as noted in the HPI.     Medications:     Current Outpatient Prescriptions:      ACCU-CHEK SHANNON PLUS test strip, USE TO TEST BLOOD SUGARS 4 TIMES DAILY OR AS DIRECTED, Disp: 400 strip, Rfl: 0     acetaminophen (TYLENOL) 500 MG tablet, Take 500 mg by mouth nightly as needed , Disp: , Rfl:      acyclovir (ZOVIRAX) 400 MG tablet, Take 1 tablet (400 mg) by mouth 3 times daily For a  couple days, Disp: 15 tablet, Rfl: 2     acyclovir (ZOVIRAX) 400 MG tablet, Take 400 mg by mouth See Admin Instructions 5 times daily as needed for outbreaks, Disp: , Rfl:      acyclovir (ZOVIRAX) 5 % ointment, Apply topically 6 times daily (Patient taking differently: Apply topically 6 times daily As needed for outbreaks), Disp: 15 g, Rfl: 3     albuterol (PROAIR HFA, PROVENTIL HFA, VENTOLIN HFA) 108 (90 BASE) MCG/ACT inhaler, Inhale 2 puffs into the lungs every 6 hours, Disp: 1 Inhaler, Rfl: 3     atorvastatin (LIPITOR) 10 MG tablet, Take 1 tablet (10 mg) by mouth daily, Disp: 90 tablet, Rfl: 0     BD JANE U/F 32G X 4 MM insulin pen needle, USE 4 DAILY AS DIRECTED., Disp: 400 each, Rfl: 0     blood glucose monitoring (ACCU-CHEK SHANNON PLUS) test strip, Use to test blood sugar 4 times daily or as directed.  Ok to substitute alternative if insurance prefers., Disp: 120 strip, Rfl: 11     blood glucose monitoring (ACCU-CHEK FASTCLIX) lancets, Use to test blood sugar 4 times daily or as directed.  Ok to substitute alternative if insurance prefers., Disp: 1 Box, Rfl: 11     blood glucose monitoring (ACCU-CHEK MULTICLIX) lancets, Use to test blood sugar 4 times daily or as directed., Disp: 4 Box, Rfl: 3     COMPRESSION STOCKINGS, Wear compression stockings in affected leg (right leg) or both legs most of the time during the day and take them off at night., Disp: 2 each, Rfl: 2     cyclobenzaprine (FLEXERIL) 10 MG tablet, Take 1 tablet (10 mg) by mouth nightly as needed for muscle spasms, Disp: 14 tablet, Rfl: 1     escitalopram (LEXAPRO) 20 MG tablet, TAKE 1 TABLET (20 MG) BY MOUTH DAILY, Disp: 30 tablet, Rfl: 1     fluticasone (FLONASE) 50 MCG/ACT nasal spray, Spray 1-2 sprays into both nostrils daily (Patient taking differently: Spray 1-2 sprays into both nostrils daily as needed for allergies ), Disp: 16 g, Rfl: 5     fluticasone (FLOVENT HFA) 220 MCG/ACT inhaler, Inhale 2 puffs into the lungs 2 times daily, Disp: 3  Inhaler, Rfl: 3     insulin pen needle (B-D U/F) 31G X 8 MM, Use 4 times daily (with Lantus and Novolog) or as directed., Disp: 400 each, Rfl: 3     ketoconazole (NIZORAL) 2 % cream, Apply topically 2 times daily, Disp: 60 g, Rfl: 1     LANTUS SOLOSTAR 100 UNIT/ML soln, INJECT 25 UNTIS SUBCUTANEOUSLY EVERY DAY, Disp: 15 mL, Rfl: 0     lidocaine (LIDODERM) 5 % Patch, Apply patch to painful area for up to 12 h within a 24 h period.  Remove after 12 hours., Disp: 30 patch, Rfl: 0     lisinopril (PRINIVIL/ZESTRIL) 2.5 MG tablet, TAKE 1 TABLET (2.5 MG) BY MOUTH DAILY, Disp: 90 tablet, Rfl: 0     metFORMIN (GLUCOPHAGE-XR) 500 MG 24 hr tablet, TAKE 2 TABLETS BY MOUTH DAILY WITH DINNER, Disp: 180 tablet, Rfl: 0     metoprolol succinate (TOPROL XL) 25 MG 24 hr tablet, Take 0.5 tablets (12.5 mg) by mouth 2 times daily Take 50 mg by mouth in combination with 12.5 for a total of 62.5 twice a day, Disp: 30 tablet, Rfl: 3     metoprolol succinate (TOPROL-XL) 100 MG 24 hr tablet, Take 50 mg by mouth in combination with 12.5 for a total of 62.5 twice a day, Disp: 90 tablet, Rfl: 3     montelukast (SINGULAIR) 10 MG tablet, TAKE 1 TABLET BY MOUTH AT BEDTIME, Disp: 90 tablet, Rfl: 0     NOVOLOG FLEXPEN 100 UNIT/ML soln, INJECT 12 UNITS SUBCUTANEOUS 3 TIMES DAILY (WITH MEALS), Disp: 15 mL, Rfl: 0     order for DME, Equipment being ordered: Full face mask for CPAP - size: F10 large, Disp: 1 each, Rfl: 0     order for DME, Oxygen: Patient requires supplemental Oxygen 4 LPM via nasal canula with activity. Please provide patient with a home unit and with portability capability. Oxygen will be for a lifetime., Disp: 1 Device, Rfl: 0     potassium chloride SA (K-DUR/KLOR-CON M) 20 MEQ CR tablet, Increase KCL to 40 MEQ in AM and 20 MEQ in the evening., Disp: 180 tablet, Rfl: 3     predniSONE (DELTASONE) 1 MG tablet, Use with 5 mg tabs to taper: 10 mg and 9 mg every other day for 1m, 9 mg/day for 1m, 9 mg and 8 mg every other day for 1m, etc.  until at 7mg, Disp: 120 tablet, Rfl: 6     predniSONE (DELTASONE) 10 MG tablet, Take 2 tablets for three days (4/12-14), then take 1 tablet daily (10 mg daily starting 4/15), Disp: 90 tablet, Rfl: 3     predniSONE (DELTASONE) 5 MG tablet, Use with 1 mg tabs to taper: 10 mg and 9 mg every other day for 1m, 9 mg/day for 1m, 9 mg and 8 mg every other day for 1m, etc. until at 7mg, Disp: 30 tablet, Rfl: 6     spironolactone (ALDACTONE) 25 MG tablet, Take 2 tablets (50 mg) by mouth daily, Disp: 180 tablet, Rfl: 3     torsemide (DEMADEX) 20 MG tablet, Take 40 mg in the morning, 30 mg in the afternoon., Disp: 105 tablet, Rfl: 3     traZODone (DESYREL) 50 MG tablet, TAKE 1/2-1 TABLET BY MOUTH NIGHTLY AS NEEDED, CAN TITRATE DOWN TO 1/2TAB OR UP TO 2TABS AS NEEDED, Disp: 180 tablet, Rfl: 0     warfarin (COUMADIN) 7.5 MG tablet, TAKE 1 TABLET BY MOUTH DAILY. CURRENT DOSE IS 7.5 MG DAILY. DOSE ADJUSTED PER INR RESULT., Disp: 90 tablet, Rfl: 3     alendronate (FOSAMAX) 70 MG tablet, Take 1 tablet (70 mg) by mouth every 7 days (Patient not taking: Reported on 7/25/2018), Disp: 4 tablet, Rfl: 11    Allergies:  Augmentin   Codeine  Phenobarbital   Seasonal allergies     Past Medical History:  Alcohol abuse, in remission   Antiplatelet or antithrombotic long term use   Atrial fibrillation   Cardiomegaly   Osteopenia   Diverticulosis of colon   Hypertension   GERD  Insomnia   Irregular heart beat   Lumbago   Major depressive disorder  ANGELICA  Osteoarthritis   Sjogren's syndrome   Tobacco use disorder    Past Surgical History:  Back surgery   Left breast biopsy   Appendectomy   Exploratory abdomen laparoscopic, adhesions, resolved RLQ pain   Cholecystectomy   Colectomy left   Cardiac surgery   GUNJAN BSO  Insert ureteral stent   Sigmoidoscopy flexible   Takedown ileostomy      Social History:  Presents with a female    Works as professor at [employer].   Former smoker   History of alcohol use disorder, in remission   No  "drug use      Family History:  Mother - CAD, hypertension, cerebrovascular disease, hyperlipidemia   Father - Alzheimer disease, hypertension, hyperlipidemia   Sister - CAD, hypertension, colorectal cancer  Brother  - hypertension, prostate cancer, parkinsonism   Daughter - breast cancer      Negative for bleeding or clotting disorders or adverse reactions to anesthesia.    Physical Examination:  Blood pressure 105/73, height 1.702 m (5' 7\"), weight 96.2 kg (212 lb), not currently breastfeeding.    General: alert, pleasant, no distress. sitting comfortably in chair  Back/Spine: no tenderness to palpation of spinous processes, or paraspinous musculature of lumbar spine. full ROM with flexion, extension, twisting, and side bending without pain. Straight leg raise positive  On left for pain in back but no radicular symptoms. Mild hamstring tightness noted. No pain in back with DURGA testing bilaterally.   Neuro: Strength testing performed while in a wheel chair. Strength 5/5 in dorsiflexion and plantar flexion of ankle and great toe. 5/5 strength on knee flexion and extension and hip flexion. SILT on bilateral lower extremities,  patellar and achilles reflexes 2+ and symmetric, babinski downgoing bilaterally .     Imaging:   No imaging     Assessment:   78 year old female with low back pain resulting from a fall to her buttocks on cement about 3 weeks ago.      Plan:   After discussion of her condition I explained her options going forward including MRI of the lumbar spine, injections, Prednisone burst, Tizanidine, Voltaren gel, formal physical therapy, and pain medication. We discussed the risks and benefits to each and the patient has elected to use Tylenol 1000 mg TID with her topical gel and begin formal physical therapy. The patient voiced understanding of the plan going forward.     Charles Keyes MD    Scribe Disclosure:   I, Hipolito Kraft, am serving as a scribe to document services personally performed by " Charles Keyes MD at this visit, based upon the provider's statements to me. All documentation has been reviewed by the aforementioned provider prior to being entered into the official medical record.     Portions of this medical record were completed by a scribe. UPON MY REVIEW AND AUTHENTICATION BY ELECTRONIC SIGNATURE, this confirms (a) I performed the applicable clinical services, and (b) the record is accurate.

## 2018-07-27 ENCOUNTER — TELEPHONE (OUTPATIENT)
Dept: PULMONOLOGY | Facility: CLINIC | Age: 78
End: 2018-07-27

## 2018-07-27 NOTE — TELEPHONE ENCOUNTER
Faxed to [facility] fx [phone] per [facility] request:  Progress notes from Dr Walsh, 3/22/17 to 7/25/18 (52 pages). Martha Dejesus LPN

## 2018-08-09 DIAGNOSIS — F33.1 MAJOR DEPRESSIVE DISORDER, RECURRENT EPISODE, MODERATE (H): ICD-10-CM

## 2018-08-09 DIAGNOSIS — E11.69 TYPE 2 DIABETES MELLITUS WITH OTHER SPECIFIED COMPLICATION (H): ICD-10-CM

## 2018-08-09 RX ORDER — INSULIN ASPART 100 [IU]/ML
INJECTION, SOLUTION INTRAVENOUS; SUBCUTANEOUS
Qty: 15 ML | Refills: 0 | Status: SHIPPED | OUTPATIENT
Start: 2018-08-09 | End: 2018-09-13

## 2018-08-09 NOTE — TELEPHONE ENCOUNTER
"Novolog:  Prescription approved per [facility] [place].    Lexapro:  Routing refill request to provider for review/approval because:  Looks like dose changed to 20mg at last OV?  New Rx needed    Lydia HALEY RN    Requested Prescriptions   Pending Prescriptions Disp Refills     NOVOLOG FLEXPEN 100 UNIT/ML soln [Pharmacy Med Name: NOVOLOG 100 UNITS/ML FLEXPEN]  0     Sig: INJECT 12 UNITS SUBCUTANEOUS 3 TIMES DAILY (WITH MEALS)    Short Acting Insulin Protocol Passed    8/9/2018  2:00 AM       Passed - Blood pressure less than 140/90 in past 6 months    BP Readings from Last 3 Encounters:   07/25/18 105/73   07/25/18 105/73   07/12/18 107/66                Passed - LDL on file in past 12 months    Recent Labs   Lab Test  05/23/18   1028   LDL  21            Passed - Microalbumin on file in past 12 months    Recent Labs   Lab Test  10/06/17   1422   MICROL  14   UMALCR  29.65*            Passed - Serum creatinine on file in past 12 months    Recent Labs   Lab Test  05/23/18   1028   CR  0.90            Passed - HgbA1C in past 3 or 6 months    If HgbA1C is 8 or greater, it needs to be on file within the past 3 months.  If less than 8, must be on file within the past 6 months.     Recent Labs   Lab Test  05/23/18   1028   A1C  6.9*            Passed - Patient is age 18 or older       Passed - Recent (6 mo) or future (30 days) visit within the authorizing provider's specialty    Patient had office visit in the last 6 months or has a visit in the next 30 days with authorizing provider or within the authorizing provider's specialty.  See \"Patient Info\" tab in inbasket, or \"Choose Columns\" in Meds & Orders section of the refill encounter.            escitalopram (LEXAPRO) 10 MG tablet [Pharmacy Med Name: ESCITALOPRAM 10 MG TABLET] 90 tablet 0     Sig: TAKE 1 TABLET BY MOUTH EVERY DAY    SSRIs Protocol Failed    8/9/2018  2:00 AM       Failed - PHQ-9 score less than 5 in past 6 months    Please review last PHQ-9 score.       " "   Passed - Patient is age 18 or older       Passed - No active pregnancy on record       Passed - No positive pregnancy test in last 12 months       Passed - Recent (6 mo) or future (30 days) visit within the authorizing provider's specialty    Patient had office visit in the last 6 months or has a visit in the next 30 days with authorizing provider or within the authorizing provider's specialty.  See \"Patient Info\" tab in inbasket, or \"Choose Columns\" in Meds & Orders section of the refill encounter.            Next 5 appointments (look out 90 days)     Aug 17, 2018  1:00 PM CDT   Office Visit with Ilene Tristan MD   [facility] ([facility]    [address] [address] [phone]                  "

## 2018-08-10 RX ORDER — ESCITALOPRAM OXALATE 10 MG/1
TABLET ORAL
Qty: 90 TABLET | Refills: 0 | OUTPATIENT
Start: 2018-08-10

## 2018-08-10 NOTE — TELEPHONE ENCOUNTER
Yes- lexapro dose changed to 20mg/d on 7/6/18, and she has a refill for that dose.  Declined rx- unable to change it- put in reasoning as above.  CW

## 2018-08-17 ENCOUNTER — TELEPHONE (OUTPATIENT)
Dept: FAMILY MEDICINE | Facility: CLINIC | Age: 78
End: 2018-08-17

## 2018-08-17 ENCOUNTER — OFFICE VISIT (OUTPATIENT)
Dept: FAMILY MEDICINE | Facility: CLINIC | Age: 78
End: 2018-08-17
Payer: MEDICARE

## 2018-08-17 VITALS
WEIGHT: 212 LBS | HEART RATE: 80 BPM | HEIGHT: 67 IN | OXYGEN SATURATION: 96 % | TEMPERATURE: 98.9 F | DIASTOLIC BLOOD PRESSURE: 73 MMHG | BODY MASS INDEX: 33.27 KG/M2 | SYSTOLIC BLOOD PRESSURE: 111 MMHG

## 2018-08-17 DIAGNOSIS — I48.21 PERMANENT ATRIAL FIBRILLATION (H): ICD-10-CM

## 2018-08-17 DIAGNOSIS — M54.41 RIGHT-SIDED LOW BACK PAIN WITH RIGHT-SIDED SCIATICA, UNSPECIFIED CHRONICITY: Primary | ICD-10-CM

## 2018-08-17 DIAGNOSIS — F33.1 MAJOR DEPRESSIVE DISORDER, RECURRENT EPISODE, MODERATE (H): ICD-10-CM

## 2018-08-17 DIAGNOSIS — J84.9 ILD (INTERSTITIAL LUNG DISEASE) (H): ICD-10-CM

## 2018-08-17 DIAGNOSIS — M35.02 SJOGREN'S SYNDROME WITH LUNG INVOLVEMENT (H): ICD-10-CM

## 2018-08-17 DIAGNOSIS — Z79.01 LONG TERM CURRENT USE OF ANTICOAGULANT THERAPY: ICD-10-CM

## 2018-08-17 LAB — INR POINT OF CARE: 3 (ref 0.86–1.14)

## 2018-08-17 PROCEDURE — 99214 OFFICE O/P EST MOD 30 MIN: CPT | Performed by: FAMILY MEDICINE

## 2018-08-17 PROCEDURE — 36416 COLLJ CAPILLARY BLOOD SPEC: CPT | Performed by: FAMILY MEDICINE

## 2018-08-17 PROCEDURE — 99207 ZZC NO CHARGE NURSE ONLY: CPT | Performed by: FAMILY MEDICINE

## 2018-08-17 PROCEDURE — 85610 PROTHROMBIN TIME: CPT | Mod: QW | Performed by: FAMILY MEDICINE

## 2018-08-17 RX ORDER — TIZANIDINE 2 MG/1
2-4 TABLET ORAL 3 TIMES DAILY
Qty: 90 TABLET | Refills: 1 | Status: ON HOLD | OUTPATIENT
Start: 2018-08-17 | End: 2020-03-04

## 2018-08-17 RX ORDER — ESCITALOPRAM OXALATE 20 MG/1
TABLET ORAL
Qty: 90 TABLET | Refills: 1 | Status: SHIPPED | OUTPATIENT
Start: 2018-08-17 | End: 2019-03-17

## 2018-08-17 ASSESSMENT — ANXIETY QUESTIONNAIRES
7. FEELING AFRAID AS IF SOMETHING AWFUL MIGHT HAPPEN: NOT AT ALL
6. BECOMING EASILY ANNOYED OR IRRITABLE: SEVERAL DAYS
5. BEING SO RESTLESS THAT IT IS HARD TO SIT STILL: SEVERAL DAYS
IF YOU CHECKED OFF ANY PROBLEMS ON THIS QUESTIONNAIRE, HOW DIFFICULT HAVE THESE PROBLEMS MADE IT FOR YOU TO DO YOUR WORK, TAKE CARE OF THINGS AT HOME, OR GET ALONG WITH OTHER PEOPLE: NOT DIFFICULT AT ALL
2. NOT BEING ABLE TO STOP OR CONTROL WORRYING: NOT AT ALL
3. WORRYING TOO MUCH ABOUT DIFFERENT THINGS: NOT AT ALL
GAD7 TOTAL SCORE: 2
1. FEELING NERVOUS, ANXIOUS, OR ON EDGE: NOT AT ALL

## 2018-08-17 ASSESSMENT — PATIENT HEALTH QUESTIONNAIRE - PHQ9: 5. POOR APPETITE OR OVEREATING: NOT AT ALL

## 2018-08-17 NOTE — MR AVS SNAPSHOT
After Visit Summary   8/17/2018    Betty Tee    MRN: [id number]           Patient Information     Date Of Birth          1940        Visit Information        Provider Department      8/17/2018 1:00 PM Ilene Tristan MD [facility]        Today's Diagnoses     Right-sided low back pain with right-sided sciatica, unspecified chronicity    -  1    Major depressive disorder, recurrent episode, moderate        Permanent atrial fibrillation (H)        Essential hypertension with goal blood pressure less than 140/90           Follow-ups after your visit        Your next 10 appointments already scheduled     Aug 21, 2018 12:20 PM CDT   (Arrive by 12:05 PM)   HUNG Spine with Beatriz Mercer PT   [facility] ([facility]  )    [address] [place] [id number] [phone]            Oct 17, 2018 12:00 PM CDT   Lab with  LAB   [facility] ([facility])    [address] [place] [address]  1st Floor  [facility] [id number] [phone]            Oct 17, 2018 12:30 PM CDT   (Arrive by 12:15 PM)   CORE RETURN with LIZY Tyler CNP   [facility] Heart Care ([facility])    [address]  [address]  [facility] [id number] [phone]            Nov 01, 2018  8:00 AM CDT   FULL PULMONARY FUNCTION with  PFL B   [facility] Pulmonary Function Testing ([facility])    [address] [place] [address]  3rd Floor  [facility] [id number] [phone]            Nov 01, 2018  9:00 AM CDT   (Arrive by 8:45 AM)   Return Interstitial Lung with Maryjane Tillman MD   [facility] ([facility])    [address] [place] [address]  [address]  [facility] [id number] [phone]            Dec 06, 2018  3:00 PM CST   (Arrive by 2:45 PM)   Return Visit with Carmenza Stringer MD   [facility]umatology ([facility]    [address]  [address]  [facility] [address] [phone]              Who to contact     If you have questions or need follow up information about today's clinic visit or your schedule please contact [facility] directly at [phone].  Normal or non-critical lab and imaging results will be communicated to you by MyChart, letter or phone within 4 business days after the clinic has received the results. If you do not hear from us within 7 days, please contact the clinic through i[employer] or phone. If you have a critical or abnormal lab result, we will notify you by phone as soon as possible.  Submit refill requests through [employer]School or call your pharmacy and they will forward the refill request to us. Please allow 3 business days for your refill to be completed.          Additional Information About Your Visit        MyChart Information     mSchool gives you secure access to your electronic health record. If you see a primary care provider, you can also send messages to your care team and make appointments. If you have questions, please call your primary care clinic.  If you do not have a primary care provider, please call [phone] and they will assist you.        Care EveryWhere ID     This is your Care EveryWhere ID. This could be used by other organizations to access your [facility] medical records  [id number]        Your Vitals Were     Pulse Temperature Height Pulse Oximetry Breastfeeding? BMI (Body Mass Index)    80 98.9  F (37.2  C) (Oral) 5' 7\" (1.702 m) 96% No 33.2 kg/m2       Blood Pressure from Last 3 Encounters:   08/17/18 111/73   07/25/18 105/73   07/25/18 105/73    Weight from Last 3 Encounters:   08/17/18 212 lb (96.2 kg)   07/25/18 212 lb (96.2 kg)   07/25/18 212 lb (96.2 kg)              Today, you had the following     No orders found for display         Today's Medication Changes          These changes are accurate as of 8/17/18  2:04 PM.  If you " have any questions, ask your nurse or doctor.               Start taking these medicines.        Dose/Directions    tiZANidine 2 MG tablet   Commonly known as:  ZANAFLEX   Used for:  Right-sided low back pain with right-sided sciatica, unspecified chronicity   Started by:  Ilene Tristan MD        Dose:  2-4 mg   Take 1-2 tablets (2-4 mg) by mouth 3 times daily   Quantity:  90 tablet   Refills:  1         These medicines have changed or have updated prescriptions.        Dose/Directions    acyclovir 5 % ointment   Commonly known as:  ZOVIRAX   This may have changed:  additional instructions   Used for:  Recurrent cold sores        Apply topically 6 times daily   Quantity:  15 g   Refills:  3       fluticasone 50 MCG/ACT spray   Commonly known as:  FLONASE   This may have changed:    - when to take this  - reasons to take this   Used for:  Seasonal allergic rhinitis        Dose:  1-2 spray   Spray 1-2 sprays into both nostrils daily   Quantity:  16 g   Refills:  5         Stop taking these medicines if you haven't already. Please contact your care team if you have questions.     cyclobenzaprine 10 MG tablet   Commonly known as:  FLEXERIL   Stopped by:  Ilene Tristan MD                Where to get your medicines      These medications were sent to [facility]/pharmacy #[id number] - [place] [id number]     Phone:  [phone]     escitalopram 20 MG tablet    tiZANidine 2 MG tablet                Primary Care Provider Office Phone # Fax #    Ilene Tristan [phone] [address] [address]        Equal Access to Services     [facility]: Hadii aad ku hadasho Soomaali, waaxda luqadaha, qaybta kaalmada adeegyada, anderson shaikh. Formerly [facility] [phone].    ATENCIÓN: Si habla español, tiene a conti disposición servicios gratuitos de asistencia lingüística. Llame al  [phone].    We comply with applicable federal civil rights laws and [place] laws. We do not discriminate on the basis of race, color, national origin, age, disability, sex, sexual orientation, or gender identity.            Thank you!     Thank you for choosing [facility]  for your care. Our goal is always to provide you with excellent care. Hearing back from our patients is one way we can continue to improve our services. Please take a few minutes to complete the written survey that you may receive in the mail after your visit with us. Thank you!             Your Updated Medication List - Protect others around you: Learn how to safely use, store and throw away your medicines at www.disposemymeds.org.          This list is accurate as of 8/17/18  2:04 PM.  Always use your most recent med list.                   Brand Name Dispense Instructions for use Diagnosis    * acyclovir 400 MG tablet    ZOVIRAX     Take 400 mg by mouth See Admin Instructions 5 times daily as needed for outbreaks        * acyclovir 400 MG tablet    ZOVIRAX    15 tablet    Take 1 tablet (400 mg) by mouth 3 times daily For a couple days    Herpes labialis       acyclovir 5 % ointment    ZOVIRAX    15 g    Apply topically 6 times daily    Recurrent cold sores       albuterol 108 (90 Base) MCG/ACT inhaler    PROAIR HFA/PROVENTIL HFA/VENTOLIN HFA    1 Inhaler    Inhale 2 puffs into the lungs every 6 hours    ILD (interstitial lung disease) (H)       alendronate 70 MG tablet    FOSAMAX    4 tablet    Take 1 tablet (70 mg) by mouth every 7 days    Other osteoporosis without current pathological fracture       atorvastatin 10 MG tablet    LIPITOR    90 tablet    Take 1 tablet (10 mg) by mouth daily    Hyperlipidemia LDL goal <100       * blood glucose monitoring lancets     4 Box    Use to test blood sugar 4 times daily or as directed.    Type 2 diabetes mellitus without complication, without long-term current use of insulin (H)        * blood glucose monitoring lancets     1 Box    Use to test blood sugar 4 times daily or as directed.  Ok to substitute alternative if insurance prefers.    Type 2 diabetes mellitus without complication, without long-term current use of insulin (H)       * blood glucose monitoring test strip    ACCU-CHEK SHANNON PLUS    120 strip    Use to test blood sugar 4 times daily or as directed.  Ok to substitute alternative if insurance prefers.    Type 2 diabetes mellitus without complication, without long-term current use of insulin (H)       * ACCU-CHEK SHANNON PLUS test strip   Generic drug:  blood glucose monitoring     400 strip    USE TO TEST BLOOD SUGARS 4 TIMES DAILY OR AS DIRECTED    Type 2 diabetes mellitus without complication, without long-term current use of insulin (H)       COMPRESSION STOCKINGS     2 each    Wear compression stockings in affected leg (right leg) or both legs most of the time during the day and take them off at night.    DVT (deep venous thrombosis), right, Postphlebitic syndrome, Chronic anticoagulation, Atrial fibrillation (H)       escitalopram 20 MG tablet    LEXAPRO    90 tablet    TAKE 1 TABLET (20 MG) BY MOUTH DAILY    Major depressive disorder, recurrent episode, moderate (H)       fluticasone 220 MCG/ACT Inhaler    FLOVENT HFA    3 Inhaler    Inhale 2 puffs into the lungs 2 times daily    ILD (interstitial lung disease) (H), Follicular bronchiolitis (H)       fluticasone 50 MCG/ACT spray    FLONASE    16 g    Spray 1-2 sprays into both nostrils daily    Seasonal allergic rhinitis       * insulin pen needle 31G X 8 MM    B-D U/F    400 each    Use 4 times daily (with Lantus and Novolog) or as directed.    Type 2 diabetes mellitus without complication, without long-term current use of insulin (H)       * BD JANE U/F 32G X 4 MM   Generic drug:  insulin pen needle     400 each    USE 4 DAILY AS DIRECTED.    Type 2 diabetes mellitus without complication, without long-term current use of  insulin (H)       ketoconazole 2 % cream    NIZORAL    60 g    Apply topically 2 times daily    Cutaneous candidiasis       LANTUS SOLOSTAR 100 UNIT/ML injection   Generic drug:  insulin glargine     15 mL    INJECT 25 UNTIS SUBCUTANEOUSLY EVERY DAY    Type 2 diabetes mellitus with hyperglycemia, with long-term current use of insulin (H)       lidocaine 5 % Patch    LIDODERM    30 patch    Apply patch to painful area for up to 12 h within a 24 h period.  Remove after 12 hours.    Sacral back pain       lisinopril 2.5 MG tablet    PRINIVIL/Zestril    90 tablet    TAKE 1 TABLET (2.5 MG) BY MOUTH DAILY    Essential hypertension with goal blood pressure less than 140/90       metFORMIN 500 MG 24 hr tablet    GLUCOPHAGE-XR    180 tablet    TAKE 2 TABLETS BY MOUTH DAILY WITH DINNER    Type 2 diabetes mellitus without complication, without long-term current use of insulin (H)       * metoprolol succinate 100 MG 24 hr tablet    TOPROL-XL    90 tablet    Take 50 mg by mouth in combination with 12.5 for a total of 62.5 twice a day    Atrial fibrillation with controlled ventricular response (H)       * metoprolol succinate 25 MG 24 hr tablet    TOPROL XL    30 tablet    Take 0.5 tablets (12.5 mg) by mouth 2 times daily Take 50 mg by mouth in combination with 12.5 for a total of 62.5 twice a day    (HFpEF) heart failure with preserved ejection fraction (H), Persistent atrial fibrillation (H)       montelukast 10 MG tablet    SINGULAIR    90 tablet    TAKE 1 TABLET BY MOUTH AT BEDTIME    Sjogren's syndrome with lung involvement (H), ILD (interstitial lung disease) (H), Chronic seasonal allergic rhinitis due to other allergen       NovoLOG FLEXPEN 100 UNIT/ML injection   Generic drug:  insulin aspart     15 mL    INJECT 12 UNITS SUBCUTANEOUS 3 TIMES DAILY (WITH MEALS)    Type 2 diabetes mellitus with other specified complication (H)       * order for DME     1 Device    Oxygen: Patient requires supplemental Oxygen 4 LPM via nasal  canula with activity. Please provide patient with a home unit and with portability capability. Oxygen will be for a lifetime.    Hypoxia, ILD (interstitial lung disease) (H)       * order for DME     1 each    Equipment being ordered: Full face mask for CPAP - size: F10 large    ANGELICA (obstructive sleep apnea)       potassium chloride SA 20 MEQ CR tablet    K-DUR/KLOR-CON M    180 tablet    Increase KCL to 40 MEQ in AM and 20 MEQ in the evening.    (HFpEF) heart failure with preserved ejection fraction (H)       * predniSONE 10 MG tablet    DELTASONE    90 tablet    Take 2 tablets for three days (4/12-14), then take 1 tablet daily (10 mg daily starting 4/15)    ILD (interstitial lung disease) (H), Sjogren's syndrome with lung involvement (H)       * predniSONE 1 MG tablet    DELTASONE    120 tablet    Use with 5 mg tabs to taper: 10 mg and 9 mg every other day for 1m, 9 mg/day for 1m, 9 mg and 8 mg every other day for 1m, etc. until at 7mg    Primary Sjogren's syndrome (H)       * predniSONE 5 MG tablet    DELTASONE    30 tablet    Use with 1 mg tabs to taper: 10 mg and 9 mg every other day for 1m, 9 mg/day for 1m, 9 mg and 8 mg every other day for 1m, etc. until at 7mg    Primary Sjogren's syndrome (H)       spironolactone 25 MG tablet    ALDACTONE    180 tablet    Take 2 tablets (50 mg) by mouth daily    Chronic diastolic congestive heart failure (H)       tiZANidine 2 MG tablet    ZANAFLEX    90 tablet    Take 1-2 tablets (2-4 mg) by mouth 3 times daily    Right-sided low back pain with right-sided sciatica, unspecified chronicity       torsemide 20 MG tablet    DEMADEX    105 tablet    Take 40 mg in the morning, 30 mg in the afternoon.    (HFpEF) heart failure with preserved ejection fraction (H)       traZODone 50 MG tablet    DESYREL    180 tablet    TAKE 1/2-1 TABLET BY MOUTH NIGHTLY AS NEEDED, CAN TITRATE DOWN TO 1/2TAB OR UP TO 2TABS AS NEEDED    Insomnia due to medical condition       TYLENOL 500 MG tablet    Generic drug:  acetaminophen      Take 500 mg by mouth nightly as needed    Dizziness       warfarin 7.5 MG tablet    COUMADIN    90 tablet    TAKE 1 TABLET BY MOUTH DAILY. CURRENT DOSE IS 7.5 MG DAILY. DOSE ADJUSTED PER INR RESULT.    Atrial fibrillation with controlled ventricular response (H)       * Notice:  This list has 15 medication(s) that are the same as other medications prescribed for you. Read the directions carefully, and ask your doctor or other care provider to review them with you.

## 2018-08-17 NOTE — TELEPHONE ENCOUNTER
ANTICOAGULATION FOLLOW-UP CLINIC VISIT    Patient Name:  Betty Tee  Date:  8/17/2018  Contact Type:  Telephone    SUBJECTIVE:  Bleeding Signs/Symptoms: None  Thromboembolic Signs/Symptoms: None    Medication Changes:  No  Dietary Changes:  No  Bacterial/Viral Infection:  No    Missed Coumadin Doses:  None  Other Concerns:  No      ASSESSMENT/PLAN:  See: ANTICOAGULATION QIC flow sheet.    INR 3.0   Continue 7.5mg daily  Recheck 4 weeks  Lydia HALEY RN    Dosage adjustment made based on physician directed care plan.    JIMI VOGT

## 2018-08-17 NOTE — PROGRESS NOTES
SUBJECTIVE:   Betty Tee is a 78 year old female who presents to clinic today for the following health issues:  Follow up on multiple things -  -Fall 07/2018 still having sciatic pain, though now on R side  -Excessive sweating  -Medication questions regarding Ibuprofen alternative  -INR check  -Mood check w/ lexapro increase    --Sciatic/MS pain- issues since fall on ~7/4/18.  At first, sx's were in her left hip/leg/sacrum.  Then neck started hurting a week later- resolved with chiro.  Massage- felt good, not lasting relief.  Saw Sports Medicine (when pain was still on the left side), offered PT, but she had to cancel the appt - has one coming up.  Discussed options, including tizanadine as the flexeril was not helping.  Hasn't taken the full dose of tylenol (1000mg TID) as recommended yet.  Did try advil 800mg/d, and woke up with a bad bloody nose.    ~1 1/2 wks ago, her buttock/leg sx's moved to R side hip/leg.  Getting burning, shooting pain from R buttock down to R calf.  This side/pain is more familiar to her.  On/off sx's on this side for decades, is s/p a laminectomy, body cast many yrs ago.  Ice/heat helps.    Chiro appt is coming up.  Rescheduled PT is coming up.      --Due for INR check- will do that today.    --Pulmonary f/u- hasn't used oxygen as much lately.  Uses the oxygen if she goes down and up the stairs.  Forgot to ask about portable O2 options when she saw pulmonary.    --Rheum f/u- Trying to get to 8mg of prednisone - has been alternating 10/9mg days, now 9/8mg days.    --MDD- increased dose of lexapro 20mg/d at last visit, from 10mg/d.  More mellow.  Less irritable.  Friend here today agrees she's been much less irritable with the increase.    PHQ-9 SCORE 3/28/2018 7/6/2018 8/17/2018   Total Score - - -   Total Score MyChart - - -   Total Score 6 8 3     DELORES-7 SCORE 11/8/2017 7/6/2018 8/17/2018   Total Score 4 2 2       --Wondering about probiotics?  Was having upset stomach and loose  stools.  Probiotics may have helped....  *Can stop now.      Problem list and histories reviewed & adjusted, as indicated.  Additional history: as documented    Patient Active Problem List   Diagnosis     Temporomandibular joint disorder     Diverticulitis of colon     Disorder of bone and cartilage     Osteoarthritis     Alcohol abuse, in remission     Restless legs syndrome (RLS)     Major depressive disorder, recurrent episode, moderate (H)     Essential hypertension with goal blood pressure less than 140/90     Sciatica     Advanced directives, counseling/discussion     Colouterine fistula     Permanent atrial fibrillation (H)     Left ventricular hypertrophy     Health Care Home     Insomnia     Joint pains     Allergic reaction caused by a drug - likely plaquenil     Breast fibroadenoma     DVT (deep venous thrombosis) (H)     Fatty liver disease, nonalcoholic     Rib pain     Neck mass     Gastroesophageal reflux disease without esophagitis     Enlarged lymph node     Sjogren's syndrome with lung involvement (H)     ANGELICA (obstructive sleep apnea)     ILD (interstitial lung disease) (H)     Lower GI bleed     Acute and chronic respiratory failure with hypoxia (H)     (HFpEF) heart failure with preserved ejection fraction (H)     Type 2 diabetes mellitus with hyperglycemia, with long-term current use of insulin (H)     Heart failure, chronic, with acute decompensation (H)     Hyperlipidemia LDL goal <100     Long term current use of anticoagulant therapy     Inflammatory arthritis      Current Outpatient Prescriptions   Medication Sig Dispense Refill     acetaminophen (TYLENOL) 500 MG tablet Take 500 mg by mouth nightly as needed        acyclovir (ZOVIRAX) 400 MG tablet Take 1 tablet (400 mg) by mouth 3 times daily For a couple days 15 tablet 2     acyclovir (ZOVIRAX) 5 % ointment Apply topically 6 times daily (Patient taking differently: Apply topically 6 times daily As needed for outbreaks) 15 g 3      albuterol (PROAIR HFA, PROVENTIL HFA, VENTOLIN HFA) 108 (90 BASE) MCG/ACT inhaler Inhale 2 puffs into the lungs every 6 hours 1 Inhaler 3     alendronate (FOSAMAX) 70 MG tablet Take 1 tablet (70 mg) by mouth every 7 days 4 tablet 11     atorvastatin (LIPITOR) 10 MG tablet Take 1 tablet (10 mg) by mouth daily 90 tablet 0     BD JANE U/F 32G X 4 MM insulin pen needle USE 4 DAILY AS DIRECTED. 400 each 0     blood glucose monitoring (ACCU-CHEK SHANNON PLUS) test strip Use to test blood sugar 4 times daily or as directed.  Ok to substitute alternative if insurance prefers. 120 strip 11     blood glucose monitoring (ACCU-CHEK FASTCLIX) lancets Use to test blood sugar 4 times daily or as directed.  Ok to substitute alternative if insurance prefers. 1 Box 11     blood glucose monitoring (ACCU-CHEK MULTICLIX) lancets Use to test blood sugar 4 times daily or as directed. 4 Box 3     escitalopram (LEXAPRO) 20 MG tablet TAKE 1 TABLET (20 MG) BY MOUTH DAILY 90 tablet 1     fluticasone (FLONASE) 50 MCG/ACT nasal spray Spray 1-2 sprays into both nostrils daily (Patient taking differently: Spray 1-2 sprays into both nostrils daily as needed for allergies ) 16 g 5     fluticasone (FLOVENT HFA) 220 MCG/ACT inhaler Inhale 2 puffs into the lungs 2 times daily 3 Inhaler 3     insulin pen needle (B-D U/F) 31G X 8 MM Use 4 times daily (with Lantus and Novolog) or as directed. 400 each 3     LANTUS SOLOSTAR 100 UNIT/ML soln INJECT 25 UNTIS SUBCUTANEOUSLY EVERY DAY 15 mL 0     lidocaine (LIDODERM) 5 % Patch Apply patch to painful area for up to 12 h within a 24 h period.  Remove after 12 hours. 30 patch 0     lisinopril (PRINIVIL/ZESTRIL) 2.5 MG tablet TAKE 1 TABLET (2.5 MG) BY MOUTH DAILY 90 tablet 0     metFORMIN (GLUCOPHAGE-XR) 500 MG 24 hr tablet TAKE 2 TABLETS BY MOUTH DAILY WITH DINNER 180 tablet 0     metoprolol succinate (TOPROL XL) 25 MG 24 hr tablet Take 0.5 tablets (12.5 mg) by mouth 2 times daily Take 50 mg by mouth in combination  with 12.5 for a total of 62.5 twice a day 30 tablet 3     montelukast (SINGULAIR) 10 MG tablet TAKE 1 TABLET BY MOUTH AT BEDTIME 90 tablet 0     NOVOLOG FLEXPEN 100 UNIT/ML soln INJECT 12 UNITS SUBCUTANEOUS 3 TIMES DAILY (WITH MEALS) 15 mL 0     potassium chloride SA (K-DUR/KLOR-CON M) 20 MEQ CR tablet Increase KCL to 40 MEQ in AM and 20 MEQ in the evening. 180 tablet 3     predniSONE (DELTASONE) 1 MG tablet Use with 5 mg tabs to taper: 10 mg and 9 mg every other day for 1m, 9 mg/day for 1m, 9 mg and 8 mg every other day for 1m, etc. until at 7mg 120 tablet 6     spironolactone (ALDACTONE) 25 MG tablet Take 2 tablets (50 mg) by mouth daily 180 tablet 3     tiZANidine (ZANAFLEX) 2 MG tablet Take 1-2 tablets (2-4 mg) by mouth 3 times daily 90 tablet 1     torsemide (DEMADEX) 20 MG tablet Take 40 mg in the morning, 30 mg in the afternoon. 105 tablet 3     traZODone (DESYREL) 50 MG tablet TAKE 1/2-1 TABLET BY MOUTH NIGHTLY AS NEEDED, CAN TITRATE DOWN TO 1/2TAB OR UP TO 2TABS AS NEEDED 180 tablet 0     warfarin (COUMADIN) 7.5 MG tablet TAKE 1 TABLET BY MOUTH DAILY. CURRENT DOSE IS 7.5 MG DAILY. DOSE ADJUSTED PER INR RESULT. 90 tablet 3     ACCU-CHEK SHANNON PLUS test strip USE TO TEST BLOOD SUGARS 4 TIMES DAILY OR AS DIRECTED 400 strip 0     acyclovir (ZOVIRAX) 400 MG tablet Take 400 mg by mouth See Admin Instructions 5 times daily as needed for outbreaks       COMPRESSION STOCKINGS Wear compression stockings in affected leg (right leg) or both legs most of the time during the day and take them off at night. 2 each 2     ketoconazole (NIZORAL) 2 % cream Apply topically 2 times daily 60 g 1     metoprolol succinate (TOPROL-XL) 100 MG 24 hr tablet Take 50 mg by mouth in combination with 12.5 for a total of 62.5 twice a day 90 tablet 3     order for DME Equipment being ordered: Full face mask for CPAP - size: F10 large 1 each 0     order for DME Oxygen: Patient requires supplemental Oxygen 4 LPM via nasal canula with  activity. Please provide patient with a home unit and with portability capability. Oxygen will be for a lifetime. 1 Device 0     predniSONE (DELTASONE) 10 MG tablet Take 2 tablets for three days (4/12-14), then take 1 tablet daily (10 mg daily starting 4/15) 90 tablet 3     predniSONE (DELTASONE) 5 MG tablet Use with 1 mg tabs to taper: 10 mg and 9 mg every other day for 1m, 9 mg/day for 1m, 9 mg and 8 mg every other day for 1m, etc. until at 7mg 30 tablet 6     Allergies   Allergen Reactions     Augmentin Nausea and Vomiting     Codeine Nausea and Vomiting     Phenobarbital Itching     Seasonal Allergies      Recent Labs   Lab Test  05/23/18   1028  04/18/18   1438   02/21/18   1230   01/31/18   1428  11/08/17   1432   10/06/17   1421   08/01/17   1146   06/22/17   1700   A1C  6.9*   --    --    --    --   7.0*   --    --   7.3*   --    --    < >   --    LDL  21   --    --   5   --    --    --    --    --    --    --    --   13   HDL  53   --    --   64   --    --    --    --    --    --    --    --   62   TRIG  113   --    --   188*   --    --    --    --    --    --    --    --   132   ALT  21   --    --    --    --    --    --    --   43   --   45   --    --    CR  0.90  0.90   < >  0.88   < >  0.96   --    < >  0.94   < >  0.90   < >  0.80   GFRESTIMATED  61  60*   < >  62   < >  56*   --    < >  58*   < >  61   < >  69   GFRESTBLACK  74  73   < >  76   < >  68   --    < >  70   < >  74   < >  84   POTASSIUM  3.5  4.3   < >  3.9   < >  4.5   --    < >  4.3   < >  3.5   < >  3.0*   TSH  2.42   --    --    --    --    --   1.40   --    --    --    --    --    --     < > = values in this interval not displayed.      BP Readings from Last 3 Encounters:   08/17/18 111/73   07/25/18 105/73   07/25/18 105/73    Wt Readings from Last 3 Encounters:   08/17/18 212 lb (96.2 kg)   07/25/18 212 lb (96.2 kg)   07/25/18 212 lb (96.2 kg)           Labs reviewed in EPIC    Reviewed and updated as needed this visit by clinical  "staff  Tobacco  Allergies  Meds  Problems       Reviewed and updated as needed this visit by Provider  Allergies  Meds  Problems         ROS:  Constitutional, HEENT, cardiovascular, pulmonary, gi and gu systems are negative, except as otherwise noted.    OBJECTIVE:     /73  Pulse 80  Temp 98.9  F (37.2  C) (Oral)  Ht 5' 7\" (1.702 m)  Wt 212 lb (96.2 kg)  SpO2 96%  Breastfeeding? No  BMI 33.2 kg/m2  Body mass index is 33.2 kg/(m^2).  GENERAL APPEARANCE: healthy, alert and no distress     EYES: PERRL, sclera clear     HENT: nose and mouth without ulcers or lesions     NECK: no adenopathy, no asymmetry, masses, or scars and thyroid normal to palpation     RESP: lungs clear to auscultation - no rales, rhonchi or wheezes     CV: regular rates and rhythm, normal S1 S2, no S3 or S4 and no murmur, click or rub      Abdomen: soft, nontender, no HSM or masses and bowel sounds normal     Ext: warm, dry, no edema      Psych: full range affect, normal speech and grooming, judgement and insight intact       ASSESSMENT/PLAN:       ICD-10-CM    1. Right-sided low back pain with right-sided sciatica, unspecified chronicity M54.41 tiZANidine (ZANAFLEX) 2 MG tablet   2. Major depressive disorder, recurrent episode, moderate F33.1 escitalopram (LEXAPRO) 20 MG tablet   3. Permanent atrial fibrillation (H) I48.2    4. Sjogren's syndrome with lung involvement (H) M35.02    5. ILD (interstitial lung disease) (H) J84.9      --R buttock/leg sciatic sx's- moved from left side (unusual for her, happened after 7/18 fall), to R side (her usual area of back/sciatic/spasm sx's).  Had nose bleed with ibuprofen- agree with her plan to avoid.  Will have her use tylenol 1000mg TID, and will send in a rx of tizanadine to try 2-4mg up to TID.  Offered trial of gabapentin, but she'd like to hold off at this point.  Cont f/u with chiro, and go to PT.  F/u with sports med if not improving.    --MDD/Anxiety- lexapro increased to 20mg/d " from 10mg/d at last visit- thinks she's more calm, less irritable (the the pain is testing that, at times when pain is bad is still very irritable).  Se's- possible increased sweating..    --Nocturia- it's still not good, but she thinks she's getting up a bit less at night since switching to take toursemide in afternoon instead of evening.    --Cont f/u with rheum and pulmonary.  Currently tapering down on prednisone- almost to 8mg/day.  She did not ask pulmonary about portable O2 options, but hasn't felt she's needed it as much lately.        Discussed continued q6wk f/u- 45 minute visits if able.  Will be due for DM and associated f/u.    Ilene Tristan MD  [facility]

## 2018-08-18 ASSESSMENT — ANXIETY QUESTIONNAIRES: GAD7 TOTAL SCORE: 2

## 2018-08-18 ASSESSMENT — PATIENT HEALTH QUESTIONNAIRE - PHQ9: SUM OF ALL RESPONSES TO PHQ QUESTIONS 1-9: 3

## 2018-08-21 ENCOUNTER — THERAPY VISIT (OUTPATIENT)
Dept: PHYSICAL THERAPY | Facility: CLINIC | Age: 78
End: 2018-08-21
Payer: MEDICARE

## 2018-08-21 DIAGNOSIS — M54.41 BILATERAL LOW BACK PAIN WITH RIGHT-SIDED SCIATICA: Primary | ICD-10-CM

## 2018-08-21 PROCEDURE — G8978 MOBILITY CURRENT STATUS: HCPCS | Mod: GP | Performed by: PHYSICAL THERAPIST

## 2018-08-21 PROCEDURE — G8979 MOBILITY GOAL STATUS: HCPCS | Mod: GP | Performed by: PHYSICAL THERAPIST

## 2018-08-21 PROCEDURE — 97110 THERAPEUTIC EXERCISES: CPT | Mod: GP | Performed by: PHYSICAL THERAPIST

## 2018-08-21 PROCEDURE — 97140 MANUAL THERAPY 1/> REGIONS: CPT | Mod: GP | Performed by: PHYSICAL THERAPIST

## 2018-08-21 PROCEDURE — 97162 PT EVAL MOD COMPLEX 30 MIN: CPT | Mod: GP | Performed by: PHYSICAL THERAPIST

## 2018-08-21 NOTE — LETTER
DEPARTMENT OF HEALTH AND HUMAN SERVICES  CENTERS FOR MEDICARE & MEDICAID SERVICES    PLAN/UPDATED PLAN OF PROGRESS FOR OUTPATIENT REHABILITATION    PATIENTS NAME:  Betty Tee     : 1940    PROVIDER NUMBER:    [id number]    HICN: [id number]      [facility]    MEDICAL RECORD NUMBER: [id number]     START OF CARE DATE:  SOC Date: 18   TYPE:  PT    PRIMARY/TREATMENT DIAGNOSIS: (Pertinent Medical Diagnosis)  Bilateral low back pain with right-sided sciatica    VISITS FROM START OF CARE:  Rxs Used: 1     [facility] Initial Evaluation  Subjective:  Patient is a 78 year old female presenting with rehab back hpi. The history is provided by the patient. No  was used.   Betty Tee is a 78 year old female with a lumbar condition.  Condition occurred with:  A fall/slip.    This is a chronic condition  2018. Patient reports pain:  Lumbar spine right.  Radiates to:  Gluteals right.  Pain is described as burning and is intermittent and reported as 9/10.   Pain is worse during the day, worse in the A.M. and worse in the P.M..  Symptoms are exacerbated by standing, walking, bending and certain positions and relieved by rest.  Since onset symptoms are gradually improving.    Previous treatment includes chiropractic.  There was no improvement following previous treatment.  General health as reported by patient is fair.  Past medical history: diabetes, heart problems, high BP, overweight, apnea, pain at night/ rest.  Medical allergies: no.  Other surgeries include:  No.  Medication history: anti depressants, anti inflammatory, heparin, high blood pressure, muscle relaxants.  Current occupation is retired. Barriers include:  Requires assistance with ADL's.  Red flags:  None as reported by the patient.    Objective:    Gait:  Pt demonstrates decreased WB on the RLE, decreased foot clearance and terminal knee extension on the right  Gait  Type:  Antalgic   Assistive Devices:  None  Physical Exam  [place] Lumbar Evaluation  Posture:  Sitting: fair  Standing: poor  Lordosis: WNL  Lateral Shift: no  Other Observations: decreased WB on the RLE    PATIENTS NAME:  Betty Tee   : 1940- Page 2    Movement Loss:  Flexion (Flex): pain and mod  Extension (EXT): nil  Side Glide R (SG R): nil  Side Glide L (SG L): nil  Test Movements:  FIS: During: increases  After: worse    Repeat FIS: During: increases  After: worse  Mechanical Response: DecrROM  EIS: During: decreases  After: better    Repeat EIS: During: decreases  After: better and centralizing  Mechanical Response: IncROM  Conclusion: derangement  Principle of Treatment:  Extension: pt responds to extension in standing and prone    Assessment/Plan:    Patient is a 78 year old female with lumbar and sacral complaints.    Patient has the following significant findings with corresponding treatment plan.                Diagnosis 1:  Low Back Pain with Right sided sciatica  Pain -  manual therapy, self management, directional preference exercise and home program  Decreased ROM/flexibility - manual therapy and therapeutic exercise  Impaired balance - neuro re-education and therapeutic activities  Impaired gait - gait training  Impaired posture - neuro re-education    Therapy Evaluation Codes:   1) History comprised of:   Personal factors that impact the plan of care:      Age, Anxiety, Overall behavior pattern and Time since onset of symptoms.    Comorbidity factors that impact the plan of care are:      diabetes, heart problems, high BP, overweight, apnea, pain at night/ rest.     Medications impacting care: Anti-depressant, Anti-inflammatory, High blood pressure, Heparin/coumadin and Muscle relaxant.  2) Examination of Body Systems comprised of:   Body structures and functions that impact the plan of care:      Lumbar spine and Pelvis.   Activity limitations that impact the plan of care are:   "    Standing and Walking.  3) Clinical presentation characteristics are:   Evolving/Changing.  4) Decision-Making    Moderate complexity using standardized patient assessment instrument and/or measureable assessment of functional outcome.  Cumulative Therapy Evaluation is: Moderate complexity.    Previous and current functional limitations:  (See Goal Flow Sheet for this information)    Short term and Long term goals: (See Goal Flow Sheet for this information)     Communication ability:  Patient appears to be able to clearly communicate and understand verbal and written communication and follow directions correctly.  PATIENTS NAME:  Betty Tee   : 1940- Page 3    Treatment Explanation - The following has been discussed with the patient:   RX ordered/plan of care  Anticipated outcomes  Possible risks and side effects  This patient would benefit from PT intervention to resume normal activities.   Rehab potential is good.    Frequency:  2 X week, once daily  Duration:  for 6 weeks  Discharge Plan:  Achieve all LTG.  Independent in home treatment program.  Reach maximal therapeutic benefit.    Caregiver Signature/Credentials _____________________________ Date ________       Treating Provider: Beatriz Mercer, PT, DPT   I have reviewed and certified the need for these services and plan of treatment while under my care.        PHYSICIAN'S SIGNATURE:   _________________________________________  Date___________   Charles Keyes MD    Certification period:  Beginning of Cert date period: 18 to  End of Cert period date: 18     Functional Level Progress Report: Please see attached \"Goal Flow sheet for Functional level.\"    ____X____ Continue Services or       ________ DC Services                Service dates: From  SOC Date: 18 date to present                         "

## 2018-08-21 NOTE — PROGRESS NOTES
[facility] Initial Evaluation  Subjective:  Patient is a 78 year old female presenting with rehab back hpi. The history is provided by the patient. No  was used.   Betty Tee is a 78 year old female with a lumbar condition.  Condition occurred with:  A fall/slip.    This is a chronic condition  7/4/2018.    Patient reports pain:  Lumbar spine right.  Radiates to:  Gluteals right.  Pain is described as burning and is intermittent and reported as 9/10.   Pain is worse during the day, worse in the A.M. and worse in the P.M..  Symptoms are exacerbated by standing, walking, bending and certain positions and relieved by rest.  Since onset symptoms are gradually improving.    Previous treatment includes chiropractic.  There was no improvement following previous treatment.  General health as reported by patient is fair.  Past medical history: diabetes, heart problems, high BP, overweight, apnea, pain at night/ rest.  Medical allergies: no.  Other surgeries include:  No.  Medication history: anti depressants, anti inflammatory, heparin, high blood pressure, muscle relaxants.  Current occupation is retired.        Barriers include:  Requires assistance with ADL's.    Red flags:  None as reported by the patient.                        Objective:    Gait:  Pt demonstrates decreased WB on the RLE, decreased foot clearance and terminal knee extension on the right  Gait Type:  Antalgic   Assistive Devices:  None            Physical Exam      [place] Lumbar Evaluation    Posture:  Sitting: fair  Standing: poor  Lordosis: WNL  Lateral Shift: no    Other Observations: decreased WB on the RLE  Movement Loss:  Flexion (Flex): pain and mod  Extension (EXT): nil  Side Glide R (SG R): nil  Side Glide L (SG L): nil  Test Movements:  FIS: During: increases  After: worse    Repeat FIS: During: increases  After: worse  Mechanical Response: DecrROM  EIS: During: decreases  After: better     Repeat EIS: During: decreases  After: better and centralizing  Mechanical Response: IncROM            Conclusion: derangement  Principle of Treatment:      Extension: pt responds to extension in standing and prone                                           ROS    Assessment/Plan:    Patient is a 78 year old female with lumbar and sacral complaints.    Patient has the following significant findings with corresponding treatment plan.                Diagnosis 1:  Low Back Pain with Right sided sciatica  Pain -  manual therapy, self management, directional preference exercise and home program  Decreased ROM/flexibility - manual therapy and therapeutic exercise  Impaired balance - neuro re-education and therapeutic activities  Impaired gait - gait training  Impaired posture - neuro re-education    Therapy Evaluation Codes:   1) History comprised of:   Personal factors that impact the plan of care:      Age, Anxiety, Overall behavior pattern and Time since onset of symptoms.    Comorbidity factors that impact the plan of care are:      diabetes, heart problems, high BP, overweight, apnea, pain at night/ rest.     Medications impacting care: Anti-depressant, Anti-inflammatory, High blood pressure, Heparin/coumadin and Muscle relaxant.  2) Examination of Body Systems comprised of:   Body structures and functions that impact the plan of care:      Lumbar spine and Pelvis.   Activity limitations that impact the plan of care are:      Standing and Walking.  3) Clinical presentation characteristics are:   Evolving/Changing.  4) Decision-Making    Moderate complexity using standardized patient assessment instrument and/or measureable assessment of functional outcome.  Cumulative Therapy Evaluation is: Moderate complexity.    Previous and current functional limitations:  (See Goal Flow Sheet for this information)    Short term and Long term goals: (See Goal Flow Sheet for this information)     Communication ability:  Patient  appears to be able to clearly communicate and understand verbal and written communication and follow directions correctly.  Treatment Explanation - The following has been discussed with the patient:   RX ordered/plan of care  Anticipated outcomes  Possible risks and side effects  This patient would benefit from PT intervention to resume normal activities.   Rehab potential is good.    Frequency:  2 X week, once daily  Duration:  for 6 weeks  Discharge Plan:  Achieve all LTG.  Independent in home treatment program.  Reach maximal therapeutic benefit.    Please refer to the daily flowsheet for treatment today, total treatment time and time spent performing 1:1 timed codes.

## 2018-08-23 ENCOUNTER — MYC MEDICAL ADVICE (OUTPATIENT)
Dept: FAMILY MEDICINE | Facility: CLINIC | Age: 78
End: 2018-08-23

## 2018-08-23 ENCOUNTER — THERAPY VISIT (OUTPATIENT)
Dept: PHYSICAL THERAPY | Facility: CLINIC | Age: 78
End: 2018-08-23
Payer: MEDICARE

## 2018-08-23 DIAGNOSIS — M54.30 SCIATICA, UNSPECIFIED LATERALITY: Primary | ICD-10-CM

## 2018-08-23 DIAGNOSIS — M54.41 BILATERAL LOW BACK PAIN WITH RIGHT-SIDED SCIATICA: ICD-10-CM

## 2018-08-23 PROCEDURE — 97140 MANUAL THERAPY 1/> REGIONS: CPT | Mod: GP | Performed by: PHYSICAL THERAPIST

## 2018-08-23 PROCEDURE — 97112 NEUROMUSCULAR REEDUCATION: CPT | Mod: GP | Performed by: PHYSICAL THERAPIST

## 2018-08-23 PROCEDURE — 97110 THERAPEUTIC EXERCISES: CPT | Mod: GP | Performed by: PHYSICAL THERAPIST

## 2018-08-24 RX ORDER — GABAPENTIN 300 MG/1
300 CAPSULE ORAL AT BEDTIME
Qty: 30 CAPSULE | Refills: 0 | Status: SHIPPED | OUTPATIENT
Start: 2018-08-24 | End: 2019-04-16

## 2018-08-24 NOTE — TELEPHONE ENCOUNTER
Sent off gabapentin 300mg   Just at bedtime  Next week with CW is back can titrate dose up but for now want to be cautions.  Do not mix with muscle relaxer or other sedating meds  Can take with tylenol.  Caution about feeling tired or dizziness if she wakes in middle of night.  PN

## 2018-08-26 DIAGNOSIS — E11.9 TYPE 2 DIABETES MELLITUS WITHOUT COMPLICATION, WITHOUT LONG-TERM CURRENT USE OF INSULIN (H): ICD-10-CM

## 2018-08-27 RX ORDER — PEN NEEDLE, DIABETIC 32GX 5/32"
NEEDLE, DISPOSABLE MISCELLANEOUS
Qty: 400 EACH | Refills: 0 | Status: SHIPPED | OUTPATIENT
Start: 2018-08-27 | End: 2018-12-01

## 2018-08-27 NOTE — TELEPHONE ENCOUNTER
"Prescription approved per [facility] Refill Protocol.  Lydia HALEY RN    Requested Prescriptions   Pending Prescriptions Disp Refills     BD JANE U/F 32G X 4 MM insulin pen needle [Pharmacy Med Name: BD UF JANE PEN NEEDLE [id number]]  0     Sig: USE 4 DAILY AS DIRECTED.    Diabetic Supplies Protocol Passed    8/26/2018  5:02 PM       Passed - Patient is 18 years of age or older       Passed - Recent (6 mo) or future (30 days) visit within the authorizing provider's specialty    Patient had office visit in the last 6 months or has a visit in the next 30 days with authorizing provider.  See \"Patient Info\" tab in inbasket, or \"Choose Columns\" in Meds & Orders section of the refill encounter.            Next 5 appointments (look out 90 days)     Oct 02, 2018  2:00 PM CDT   Office Visit with Ilene Tristan MD   [facility] ([facility])    [address] [address] [phone]                  "

## 2018-09-02 DIAGNOSIS — E11.9 TYPE 2 DIABETES MELLITUS WITHOUT COMPLICATION, WITHOUT LONG-TERM CURRENT USE OF INSULIN (H): ICD-10-CM

## 2018-09-04 RX ORDER — METFORMIN HCL 500 MG
TABLET, EXTENDED RELEASE 24 HR ORAL
Qty: 180 TABLET | Refills: 0 | Status: SHIPPED | OUTPATIENT
Start: 2018-09-04 | End: 2018-12-30

## 2018-09-04 NOTE — TELEPHONE ENCOUNTER
"Requested Prescriptions   Pending Prescriptions Disp Refills     metFORMIN (GLUCOPHAGE-XR) 500 MG 24 hr tablet [Pharmacy Med Name: METFORMIN  MG TABLET] 180 tablet 0     Sig: TAKE 2 TABLETS BY MOUTH DAILY WITH DINNER    Biguanide Agents Passed    9/2/2018  5:53 PM       Passed - Blood pressure less than 140/90 in past 6 months    BP Readings from Last 3 Encounters:   08/17/18 111/73   07/25/18 105/73   07/25/18 105/73                Passed - Patient has documented LDL within the past 12 mos.    Recent Labs   Lab Test  05/23/18   1028   LDL  21            Passed - Patient has had a Microalbumin in the past 15 mos.    Recent Labs   Lab Test  10/06/17   1422   MICROL  14   UMALCR  29.65*            Passed - Patient is age 10 or older       Passed - Patient has documented A1c within the specified period of time.    If HgbA1C is 8 or greater, it needs to be on file within the past 3 months.  If less than 8, must be on file within the past 6 months.     Recent Labs   Lab Test  05/23/18   1028   A1C  6.9*            Passed - Patient's CR is NOT>1.4 OR Patient's EGFR is NOT<45 within past 12 mos.    Recent Labs   Lab Test  05/23/18   1028   GFRESTIMATED  61   GFRESTBLACK  74       Recent Labs   Lab Test  05/23/18   1028   CR  0.90            Passed - Patient does NOT have a diagnosis of CHF.       Passed - Patient is not pregnant       Passed - Patient has not had a positive pregnancy test within the past 12 mos.        Passed - Recent (6 mo) or future (30 days) visit within the authorizing provider's specialty    Patient had office visit in the last 6 months or has a visit in the next 30 days with authorizing provider or within the authorizing provider's specialty.  See \"Patient Info\" tab in inbasket, or \"Choose Columns\" in Meds & Orders section of the refill encounter.          Prescription approved per [facility] Refill Protocol.  "

## 2018-09-07 LAB
DLCOUNC-%PRED-PRE: 78 %
DLCOUNC-PRE: 15.96 ML/MIN/MMHG
DLCOUNC-PRED: 20.22 ML/MIN/MMHG
ERV-%PRED-PRE: 69 %
ERV-PRE: 0.21 L
ERV-PRED: 0.3 L
EXPTIME-PRE: 5.99 SEC
FEF2575-%PRED-PRE: 48 %
FEF2575-PRE: 0.81 L/SEC
FEF2575-PRED: 1.66 L/SEC
FEFMAX-%PRED-PRE: 55 %
FEFMAX-PRE: 2.89 L/SEC
FEFMAX-PRED: 5.19 L/SEC
FEV1-%PRED-PRE: 44 %
FEV1-PRE: 0.92 L
FEV1FEV6-PRE: 79 %
FEV1FEV6-PRED: 78 %
FEV1FVC-PRE: 79 %
FEV1FVC-PRED: 74 %
FEV1SVC-PRE: 59 %
FEV1SVC-PRED: 68 %
FIFMAX-PRE: 2.62 L/SEC
FRCPLETH-%PRED-PRE: 84 %
FRCPLETH-PRE: 2.37 L
FRCPLETH-PRED: 2.8 L
FVC-%PRED-PRE: 42 %
FVC-PRE: 1.17 L
FVC-PRED: 2.73 L
IC-%PRED-PRE: 48 %
IC-PRE: 1.35 L
IC-PRED: 2.78 L
RVPLETH-%PRED-PRE: 95 %
RVPLETH-PRE: 2.16 L
RVPLETH-PRED: 2.26 L
TLCPLETH-%PRED-PRE: 71 %
TLCPLETH-PRE: 3.72 L
TLCPLETH-PRED: 5.19 L
VA-%PRED-PRE: 52 %
VA-PRE: 2.79 L
VC-%PRED-PRE: 50 %
VC-PRE: 1.56 L
VC-PRED: 3.08 L

## 2018-09-11 DIAGNOSIS — I50.30 (HFPEF) HEART FAILURE WITH PRESERVED EJECTION FRACTION (H): ICD-10-CM

## 2018-09-12 RX ORDER — TORSEMIDE 20 MG/1
TABLET ORAL
Start: 2018-09-12

## 2018-09-12 NOTE — TELEPHONE ENCOUNTER
"Med is being rxd by cardiology.  Sent message to miguel angel Otero RN      Requested Prescriptions   Pending Prescriptions Disp Refills     torsemide (DEMADEX) 20 MG tablet [Pharmacy Med Name: TORSEMIDE 20 MG TABLET] 105 tablet 3     Sig: TAKE 2 TABLETS BY MOUTH IN THE MORNING AND ONE AND ONE-HALF TABLET IN THE AFTERNOON    Diuretics (Including Combos) Protocol Passed    9/11/2018  3:24 PM       Passed - Blood pressure under 140/90 in past 12 months    BP Readings from Last 3 Encounters:   08/17/18 111/73   07/25/18 105/73   07/25/18 105/73                Passed - Recent (12 mo) or future (30 days) visit within the authorizing provider's specialty    Patient had office visit in the last 12 months or has a visit in the next 30 days with authorizing provider or within the authorizing provider's specialty.  See \"Patient Info\" tab in inbasket, or \"Choose Columns\" in Meds & Orders section of the refill encounter.           Passed - Patient is age 18 or older       Passed - No active pregancy on record       Passed - Normal serum creatinine on file in past 12 months    Recent Labs   Lab Test  05/23/18   1028   CR  0.90             Passed - Normal serum potassium on file in past 12 months    Recent Labs   Lab Test  05/23/18   1028   POTASSIUM  3.5                   Passed - Normal serum sodium on file in past 12 months    Recent Labs   Lab Test  05/23/18   1028   NA  137             Passed - No positive pregnancy test in past 12 months        "

## 2018-09-13 DIAGNOSIS — E11.69 TYPE 2 DIABETES MELLITUS WITH OTHER SPECIFIED COMPLICATION (H): ICD-10-CM

## 2018-09-13 RX ORDER — INSULIN ASPART 100 [IU]/ML
INJECTION, SOLUTION INTRAVENOUS; SUBCUTANEOUS
Qty: 15 ML | Refills: 0 | Status: SHIPPED | OUTPATIENT
Start: 2018-09-13 | End: 2018-10-12

## 2018-09-13 NOTE — TELEPHONE ENCOUNTER
"Prescription approved per [facility] Refill Protocol.  Sophia Hemphill RN    Requested Prescriptions   Signed Prescriptions Disp Refills     NOVOLOG FLEXPEN 100 UNIT/ML soln 15 mL 0     Sig: INJECT 12 UNITS SUBCUTANEOUS 3 TIMES DAILY (WITH MEALS)    Short Acting Insulin Protocol Passed    9/13/2018  2:04 AM       Passed - Blood pressure less than 140/90 in past 6 months    BP Readings from Last 3 Encounters:   08/17/18 111/73   07/25/18 105/73   07/25/18 105/73                Passed - LDL on file in past 12 months    Recent Labs   Lab Test  05/23/18   1028   LDL  21            Passed - Microalbumin on file in past 12 months    Recent Labs   Lab Test  10/06/17   1422   MICROL  14   UMALCR  29.65*            Passed - Serum creatinine on file in past 12 months    Recent Labs   Lab Test  05/23/18   1028   04/02/17   2213   CR  0.90   < >   --    CREAT   --    --   0.6    < > = values in this interval not displayed.            Passed - HgbA1C in past 3 or 6 months    If HgbA1C is 8 or greater, it needs to be on file within the past 3 months.  If less than 8, must be on file within the past 6 months.     Recent Labs   Lab Test  05/23/18   1028   A1C  6.9*            Passed - Patient is age 18 or older       Passed - Recent (6 mo) or future (30 days) visit within the authorizing provider's specialty    Patient had office visit in the last 6 months or has a visit in the next 30 days with authorizing provider or within the authorizing provider's specialty.  See \"Patient Info\" tab in inbasket, or \"Choose Columns\" in Meds & Orders section of the refill encounter.              "

## 2018-09-17 DIAGNOSIS — I50.32 CHRONIC DIASTOLIC CONGESTIVE HEART FAILURE (H): ICD-10-CM

## 2018-09-17 DIAGNOSIS — I50.30 (HFPEF) HEART FAILURE WITH PRESERVED EJECTION FRACTION (H): ICD-10-CM

## 2018-09-17 RX ORDER — POTASSIUM CHLORIDE 1500 MG/1
TABLET, EXTENDED RELEASE ORAL
Qty: 270 TABLET | Refills: 3 | Status: SHIPPED | OUTPATIENT
Start: 2018-09-17 | End: 2019-09-13

## 2018-09-17 RX ORDER — SPIRONOLACTONE 25 MG/1
50 TABLET ORAL DAILY
Qty: 180 TABLET | Refills: 3 | Status: SHIPPED | OUTPATIENT
Start: 2018-09-17 | End: 2019-09-13

## 2018-10-02 ENCOUNTER — OFFICE VISIT (OUTPATIENT)
Dept: FAMILY MEDICINE | Facility: CLINIC | Age: 78
End: 2018-10-02
Payer: MEDICARE

## 2018-10-02 ENCOUNTER — TELEPHONE (OUTPATIENT)
Dept: FAMILY MEDICINE | Facility: CLINIC | Age: 78
End: 2018-10-02
Payer: MEDICARE

## 2018-10-02 VITALS
TEMPERATURE: 98.3 F | BODY MASS INDEX: 33.27 KG/M2 | DIASTOLIC BLOOD PRESSURE: 69 MMHG | HEIGHT: 67 IN | SYSTOLIC BLOOD PRESSURE: 100 MMHG | OXYGEN SATURATION: 100 % | WEIGHT: 212 LBS | HEART RATE: 68 BPM

## 2018-10-02 DIAGNOSIS — F33.1 MAJOR DEPRESSIVE DISORDER, RECURRENT EPISODE, MODERATE (H): ICD-10-CM

## 2018-10-02 DIAGNOSIS — I48.21 PERMANENT ATRIAL FIBRILLATION (H): ICD-10-CM

## 2018-10-02 DIAGNOSIS — M54.41 BILATERAL LOW BACK PAIN WITH RIGHT-SIDED SCIATICA, UNSPECIFIED CHRONICITY: Primary | ICD-10-CM

## 2018-10-02 DIAGNOSIS — R21 GROIN RASH: ICD-10-CM

## 2018-10-02 DIAGNOSIS — J84.9 ILD (INTERSTITIAL LUNG DISEASE) (H): ICD-10-CM

## 2018-10-02 DIAGNOSIS — M35.00 PRIMARY SJOGREN'S SYNDROME (H): ICD-10-CM

## 2018-10-02 DIAGNOSIS — Z79.01 LONG TERM CURRENT USE OF ANTICOAGULANT THERAPY: ICD-10-CM

## 2018-10-02 DIAGNOSIS — G47.01 INSOMNIA DUE TO MEDICAL CONDITION: ICD-10-CM

## 2018-10-02 DIAGNOSIS — Z79.4 TYPE 2 DIABETES MELLITUS WITH HYPERGLYCEMIA, WITH LONG-TERM CURRENT USE OF INSULIN (H): ICD-10-CM

## 2018-10-02 DIAGNOSIS — E11.65 TYPE 2 DIABETES MELLITUS WITH HYPERGLYCEMIA, WITH LONG-TERM CURRENT USE OF INSULIN (H): ICD-10-CM

## 2018-10-02 LAB
HBA1C MFR BLD: 6.7 % (ref 0–5.6)
INR POINT OF CARE: 3.4 (ref 0.86–1.14)

## 2018-10-02 PROCEDURE — 36416 COLLJ CAPILLARY BLOOD SPEC: CPT | Performed by: FAMILY MEDICINE

## 2018-10-02 PROCEDURE — 83036 HEMOGLOBIN GLYCOSYLATED A1C: CPT | Performed by: FAMILY MEDICINE

## 2018-10-02 PROCEDURE — 82043 UR ALBUMIN QUANTITATIVE: CPT | Performed by: FAMILY MEDICINE

## 2018-10-02 PROCEDURE — 85610 PROTHROMBIN TIME: CPT | Mod: QW | Performed by: FAMILY MEDICINE

## 2018-10-02 PROCEDURE — 99207 ZZC NO CHARGE NURSE ONLY: CPT | Performed by: FAMILY MEDICINE

## 2018-10-02 PROCEDURE — 99214 OFFICE O/P EST MOD 30 MIN: CPT | Performed by: FAMILY MEDICINE

## 2018-10-02 RX ORDER — TRAZODONE HYDROCHLORIDE 50 MG/1
TABLET, FILM COATED ORAL
Qty: 180 TABLET | Refills: 0 | Status: CANCELLED | OUTPATIENT
Start: 2018-10-02

## 2018-10-02 RX ORDER — TRAZODONE HYDROCHLORIDE 50 MG/1
25-50 TABLET, FILM COATED ORAL
Qty: 30 TABLET | Refills: 5 | Status: SHIPPED | OUTPATIENT
Start: 2018-10-02 | End: 2018-11-28

## 2018-10-02 RX ORDER — PREDNISONE 5 MG/1
TABLET ORAL
Qty: 30 TABLET | Refills: 6 | Status: CANCELLED | OUTPATIENT
Start: 2018-10-02

## 2018-10-02 ASSESSMENT — ANXIETY QUESTIONNAIRES
6. BECOMING EASILY ANNOYED OR IRRITABLE: MORE THAN HALF THE DAYS
5. BEING SO RESTLESS THAT IT IS HARD TO SIT STILL: SEVERAL DAYS
2. NOT BEING ABLE TO STOP OR CONTROL WORRYING: NOT AT ALL
1. FEELING NERVOUS, ANXIOUS, OR ON EDGE: NOT AT ALL
GAD7 TOTAL SCORE: 4
3. WORRYING TOO MUCH ABOUT DIFFERENT THINGS: SEVERAL DAYS
IF YOU CHECKED OFF ANY PROBLEMS ON THIS QUESTIONNAIRE, HOW DIFFICULT HAVE THESE PROBLEMS MADE IT FOR YOU TO DO YOUR WORK, TAKE CARE OF THINGS AT HOME, OR GET ALONG WITH OTHER PEOPLE: SOMEWHAT DIFFICULT
7. FEELING AFRAID AS IF SOMETHING AWFUL MIGHT HAPPEN: NOT AT ALL

## 2018-10-02 ASSESSMENT — PATIENT HEALTH QUESTIONNAIRE - PHQ9: 5. POOR APPETITE OR OVEREATING: NOT AT ALL

## 2018-10-02 NOTE — TELEPHONE ENCOUNTER
ANTICOAGULATION FOLLOW-UP CLINIC VISIT    Patient Name:  Betty Tee  Date:  10/2/2018  Contact Type:  Telephone. Dose instructions left on patient's voice mal    SUBJECTIVE:  Bleeding Signs/Symptoms: None  Thromboembolic Signs/Symptoms: None    Medication Changes:  No  Dietary Changes:  No  Bacterial/Viral Infection:  No    Missed Coumadin Doses:  None  Other Concerns:  No      ASSESSMENT/PLAN:  See: ANTICOAGULATION QIC flow sheet.    INR 3.4  Plan: Continue 7.5 mg = 52.5 mg/wk. Recheck INR in 2 weeks.  Sophia Hemphill RN      Dosage adjustment made based on physician directed care plan.    JIMI VOGT

## 2018-10-02 NOTE — MR AVS SNAPSHOT
After Visit Summary   10/2/2018    Betty Tee    MRN: [id number]           Patient Information     Date Of Birth          1940        Visit Information        Provider Department      10/2/2018 2:00 PM Ilene Tristan MD [facility]        Today's Diagnoses     Bilateral low back pain with right-sided sciatica, unspecified chronicity    -  1    Type 2 diabetes mellitus with hyperglycemia, with long-term current use of insulin (H)        Major depressive disorder, recurrent episode, moderate (H)        Insomnia due to medical condition        Groin rash        ILD (interstitial lung disease) (H)        Primary Sjogren's syndrome (H)        Permanent atrial fibrillation (H)        Long term current use of anticoagulant therapy           Follow-ups after your visit        Your next 10 appointments already scheduled     Oct 22, 2018 12:30 PM CDT   Lab with  LAB    [facility] ([facility])    [address] [place] [address]  1st Floor  [facility] [address]   [phone]            Oct 22, 2018  1:00 PM CDT   (Arrive by 12:45 PM)   CORE RETURN with LIZY Tyler CNP   [facility] Care ([facility])    [address]  [address]  [facility] [address]   [phone]            Nov 01, 2018  8:00 AM CDT   FULL PULMONARY FUNCTION with  PFL B   [facility] Pulmonary Function Testing ([facility])    [address] [place] [address]  3rd Floor  [facility] [address]   [phone]            Nov 01, 2018  9:00 AM CDT   (Arrive by 8:45 AM)   Return Interstitial Lung with Maryjane Tillman MD   [facility] ([facility])    [address] [place] [address]  [address]  [facility] [address]   [phone]            Nov 28, 2018 12:45 PM CST   Office Visit with Ilene Tristan MD   [facility] ([facility])    [address]  [address]  [facility] [id number] [phone]           Bring a current list of meds and any records pertaining to this visit. For Physicals, please bring immunization records and any forms needing to be filled out. Please arrive 10 minutes early to complete paperwork.            Dec 06, 2018  3:00 PM CST   (Arrive by 2:45 PM)   Return Visit with Carmenza Stringer MD   [facility] Rheumatology ([facility])    [address]  [address]  [facility] [address] [phone]              Who to contact     If you have questions or need follow up information about today's clinic visit or your schedule please contact [facility] directly at [phone].  Normal or non-critical lab and imaging results will be communicated to you by MyChart, letter or phone within 4 business days after the clinic has received the results. If you do not hear from us within 7 days, please contact the clinic through OpenDrivehart or phone. If you have a critical or abnormal lab result, we will notify you by phone as soon as possible.  Submit refill requests through [employer] or call your pharmacy and they will forward the refill request to us. Please allow 3 business days for your refill to be completed.          Additional Information About Your Visit        OpenDrivehart Information     [employer] gives you secure access to your electronic health record. If you see a primary care provider, you can also send messages to your care team and make appointments. If you have questions, please call your primary care clinic.  If you do not have a primary care provider, please call [phone] and they will assist you.        Care EveryWhere ID     This is your Care EveryWhere ID. This could be used by other organizations to access your [facility] medical records  [id number]        Your Vitals Were     Pulse Temperature Height Pulse Oximetry BMI (Body Mass Index)       68 98.3  F (36.8  C) (Oral) 5' 7\" " (1.702 m) 100% 33.2 kg/m2        Blood Pressure from Last 3 Encounters:   10/02/18 100/69   08/17/18 111/73   07/25/18 105/73    Weight from Last 3 Encounters:   10/02/18 212 lb (96.2 kg)   08/17/18 212 lb (96.2 kg)   07/25/18 212 lb (96.2 kg)              We Performed the Following     Albumin Random Urine Quantitative with Creat Ratio     Hemoglobin A1c          Today's Medication Changes          These changes are accurate as of 10/2/18 11:59 PM.  If you have any questions, ask your nurse or doctor.               These medicines have changed or have updated prescriptions.        Dose/Directions    acyclovir 5 % ointment   Commonly known as:  ZOVIRAX   This may have changed:  additional instructions   Used for:  Recurrent cold sores        Apply topically 6 times daily   Quantity:  15 g   Refills:  3       fluticasone 50 MCG/ACT spray   Commonly known as:  FLONASE   This may have changed:    - when to take this  - reasons to take this   Used for:  Seasonal allergic rhinitis        Dose:  1-2 spray   Spray 1-2 sprays into both nostrils daily   Quantity:  16 g   Refills:  5       * traZODone 50 MG tablet   Commonly known as:  DESYREL   This may have changed:  Another medication with the same name was added. Make sure you understand how and when to take each.   Used for:  Insomnia due to medical condition   Changed by:  Ilene Tristan MD        TAKE 1/2-1 TABLET BY MOUTH NIGHTLY AS NEEDED, CAN TITRATE DOWN TO 1/2TAB OR UP TO 2TABS AS NEEDED   Quantity:  180 tablet   Refills:  0       * traZODone 50 MG tablet   Commonly known as:  DESYREL   This may have changed:  You were already taking a medication with the same name, and this prescription was added. Make sure you understand how and when to take each.   Used for:  Insomnia due to medical condition   Changed by:  Ilene Tristan MD        Dose:  25-50 mg   Take 0.5-1 tablets (25-50 mg) by mouth nightly as needed for sleep   Quantity:  30 tablet    Refills:  5       * Notice:  This list has 2 medication(s) that are the same as other medications prescribed for you. Read the directions carefully, and ask your doctor or other care provider to review them with you.         Where to get your medicines      These medications were sent to [facility]/pharmacy #[id number] - [place], [place] - [address]  [address] [place] [address]     Phone:  [phone]     traZODone 50 MG tablet                Primary Care Provider Office Phone # Fax #    Ilene Tristan [phone] [address] [address]        Equal Access to Services     [facility]I : Hadii jossue Trammell, waaxda ana, qaybta kaalmada tanvi, anderson mercedes . So [facility] [phone].    ATENCIÓN: Si habla español, tiene a conti disposición servicios gratuitos de asistencia lingüística. Llame al [phone].    We comply with applicable federal civil rights laws and [place] laws. We do not discriminate on the basis of race, color, national origin, age, disability, sex, sexual orientation, or gender identity.            Thank you!     Thank you for choosing [facility]  for your care. Our goal is always to provide you with excellent care. Hearing back from our patients is one way we can continue to improve our services. Please take a few minutes to complete the written survey that you may receive in the mail after your visit with us. Thank you!             Your Updated Medication List - Protect others around you: Learn how to safely use, store and throw away your medicines at www.disposemymeds.org.          This list is accurate as of 10/2/18 11:59 PM.  Always use your most recent med list.                   Brand Name Dispense Instructions for use Diagnosis    acyclovir 400 MG tablet    ZOVIRAX    15 tablet    Take 1 tablet (400 mg) by mouth 3 times daily For a couple days    Herpes  labialis       acyclovir 5 % ointment    ZOVIRAX    15 g    Apply topically 6 times daily    Recurrent cold sores       albuterol 108 (90 Base) MCG/ACT inhaler    PROAIR HFA/PROVENTIL HFA/VENTOLIN HFA    1 Inhaler    Inhale 2 puffs into the lungs every 6 hours    ILD (interstitial lung disease) (H)       alendronate 70 MG tablet    FOSAMAX    4 tablet    Take 1 tablet (70 mg) by mouth every 7 days    Other osteoporosis without current pathological fracture       atorvastatin 10 MG tablet    LIPITOR    90 tablet    Take 1 tablet (10 mg) by mouth daily    Hyperlipidemia LDL goal <100       * blood glucose monitoring lancets     4 Box    Use to test blood sugar 4 times daily or as directed.    Type 2 diabetes mellitus without complication, without long-term current use of insulin (H)       * blood glucose monitoring lancets     1 Box    Use to test blood sugar 4 times daily or as directed.  Ok to substitute alternative if insurance prefers.    Type 2 diabetes mellitus without complication, without long-term current use of insulin (H)       blood glucose monitoring test strip    ACCU-CHEK SHANNON PLUS    120 strip    Use to test blood sugar 4 times daily or as directed.  Ok to substitute alternative if insurance prefers.    Type 2 diabetes mellitus without complication, without long-term current use of insulin (H)       COMPRESSION STOCKINGS     2 each    Wear compression stockings in affected leg (right leg) or both legs most of the time during the day and take them off at night.    DVT (deep venous thrombosis), right, Postphlebitic syndrome, Chronic anticoagulation, Atrial fibrillation (H)       escitalopram 20 MG tablet    LEXAPRO    90 tablet    TAKE 1 TABLET (20 MG) BY MOUTH DAILY    Major depressive disorder, recurrent episode, moderate (H)       fluticasone 220 MCG/ACT Inhaler    FLOVENT HFA    3 Inhaler    Inhale 2 puffs into the lungs 2 times daily    ILD (interstitial lung disease) (H), Follicular bronchiolitis  (H)       fluticasone 50 MCG/ACT spray    FLONASE    16 g    Spray 1-2 sprays into both nostrils daily    Seasonal allergic rhinitis       gabapentin 300 MG capsule    NEURONTIN    30 capsule    Take 1 capsule (300 mg) by mouth At Bedtime    Sciatica, unspecified laterality       * insulin pen needle 31G X 8 MM    B-D U/F    400 each    Use 4 times daily (with Lantus and Novolog) or as directed.    Type 2 diabetes mellitus without complication, without long-term current use of insulin (H)       * BD JANE U/F 32G X 4 MM   Generic drug:  insulin pen needle     400 each    USE 4 DAILY AS DIRECTED.    Type 2 diabetes mellitus without complication, without long-term current use of insulin (H)       ketoconazole 2 % cream    NIZORAL    60 g    Apply topically 2 times daily    Cutaneous candidiasis       LANTUS SOLOSTAR 100 UNIT/ML injection   Generic drug:  insulin glargine     15 mL    INJECT 25 UNTIS SUBCUTANEOUSLY EVERY DAY    Type 2 diabetes mellitus with hyperglycemia, with long-term current use of insulin (H)       lidocaine 5 % Patch    LIDODERM    30 patch    Apply patch to painful area for up to 12 h within a 24 h period.  Remove after 12 hours.    Sacral back pain       lisinopril 2.5 MG tablet    PRINIVIL/Zestril    90 tablet    TAKE 1 TABLET (2.5 MG) BY MOUTH DAILY    Essential hypertension with goal blood pressure less than 140/90       metFORMIN 500 MG 24 hr tablet    GLUCOPHAGE-XR    180 tablet    TAKE 2 TABLETS BY MOUTH DAILY WITH DINNER    Type 2 diabetes mellitus without complication, without long-term current use of insulin (H)       * metoprolol succinate 100 MG 24 hr tablet    TOPROL-XL    90 tablet    Take 50 mg by mouth in combination with 12.5 for a total of 62.5 twice a day    Atrial fibrillation with controlled ventricular response (H)       * metoprolol succinate 25 MG 24 hr tablet    TOPROL XL    30 tablet    Take 0.5 tablets (12.5 mg) by mouth 2 times daily Take 50 mg by mouth in combination  with 12.5 for a total of 62.5 twice a day    (HFpEF) heart failure with preserved ejection fraction (H), Persistent atrial fibrillation (H)       montelukast 10 MG tablet    SINGULAIR    90 tablet    TAKE 1 TABLET BY MOUTH AT BEDTIME    Sjogren's syndrome with lung involvement (H), ILD (interstitial lung disease) (H), Chronic seasonal allergic rhinitis due to other allergen       NovoLOG FLEXPEN 100 UNIT/ML injection   Generic drug:  insulin aspart     15 mL    INJECT 12 UNITS SUBCUTANEOUS 3 TIMES DAILY (WITH MEALS)    Type 2 diabetes mellitus with other specified complication (H)       * order for DME     1 Device    Oxygen: Patient requires supplemental Oxygen 4 LPM via nasal canula with activity. Please provide patient with a home unit and with portability capability. Oxygen will be for a lifetime.    Hypoxia, ILD (interstitial lung disease) (H)       * order for DME     1 each    Equipment being ordered: Full face mask for CPAP - size: F10 large    ANGELICA (obstructive sleep apnea)       potassium chloride SA 20 MEQ CR tablet    K-DUR/KLOR-CON M    270 tablet    Take 2 tabs (40 meq) in am and 1 tab (20 meq) in pm daily    (HFpEF) heart failure with preserved ejection fraction (H)       * predniSONE 10 MG tablet    DELTASONE    90 tablet    Take 2 tablets for three days (4/12-14), then take 1 tablet daily (10 mg daily starting 4/15)    ILD (interstitial lung disease) (H), Sjogren's syndrome with lung involvement (H)       * predniSONE 1 MG tablet    DELTASONE    120 tablet    Use with 5 mg tabs to taper: 10 mg and 9 mg every other day for 1m, 9 mg/day for 1m, 9 mg and 8 mg every other day for 1m, etc. until at 7mg    Primary Sjogren's syndrome (H)       * predniSONE 5 MG tablet    DELTASONE    30 tablet    Use with 1 mg tabs to taper: 10 mg and 9 mg every other day for 1m, 9 mg/day for 1m, 9 mg and 8 mg every other day for 1m, etc. until at 7mg    Primary Sjogren's syndrome (H)       spironolactone 25 MG tablet     ALDACTONE    180 tablet    Take 2 tablets (50 mg) by mouth daily    Chronic diastolic congestive heart failure (H)       tiZANidine 2 MG tablet    ZANAFLEX    90 tablet    Take 1-2 tablets (2-4 mg) by mouth 3 times daily    Right-sided low back pain with right-sided sciatica, unspecified chronicity       torsemide 20 MG tablet    DEMADEX    105 tablet    Take 40 mg in the morning, 30 mg in the afternoon.    (HFpEF) heart failure with preserved ejection fraction (H)       * traZODone 50 MG tablet    DESYREL    180 tablet    TAKE 1/2-1 TABLET BY MOUTH NIGHTLY AS NEEDED, CAN TITRATE DOWN TO 1/2TAB OR UP TO 2TABS AS NEEDED    Insomnia due to medical condition       * traZODone 50 MG tablet    DESYREL    30 tablet    Take 0.5-1 tablets (25-50 mg) by mouth nightly as needed for sleep    Insomnia due to medical condition       TYLENOL 500 MG tablet   Generic drug:  acetaminophen      Take 500 mg by mouth nightly as needed    Dizziness       warfarin 7.5 MG tablet    COUMADIN    90 tablet    TAKE 1 TABLET BY MOUTH DAILY. CURRENT DOSE IS 7.5 MG DAILY. DOSE ADJUSTED PER INR RESULT.    Atrial fibrillation with controlled ventricular response (H)       * Notice:  This list has 13 medication(s) that are the same as other medications prescribed for you. Read the directions carefully, and ask your doctor or other care provider to review them with you.

## 2018-10-02 NOTE — NURSING NOTE
"Chief Complaint   Patient presents with     Depression     /69  Pulse 68  Temp 98.3  F (36.8  C) (Oral)  Ht 5' 7\" (1.702 m)  Wt 212 lb (96.2 kg)  SpO2 100%  BMI 33.2 kg/m2 Estimated body mass index is 33.2 kg/(m^2) as calculated from the following:    Height as of this encounter: 5' 7\" (1.702 m).    Weight as of this encounter: 212 lb (96.2 kg).  Medication Reconciliation: complete      Health Maintenance that is potentially due pending provider review:  NONE    n/a    CHARLES Mcbride  "

## 2018-10-02 NOTE — PROGRESS NOTES
SUBJECTIVE:   Betty Tee is a 78 year old female who presents to clinic today for the following health issues:      Depression Followup    Status since last visit: Improved with new medication     See PHQ-9 for current symptoms.  Other associated symptoms: None    Complicating factors:   Significant life event:  No   Current substance abuse:  None  Anxiety or Panic symptoms:  No    PHQ 7/6/2018 8/17/2018 10/2/2018   PHQ-9 Total Score 8 3 4   Q9: Suicide Ideation Not at all Not at all Not at all       Amount of exercise or physical activity: None    Problems taking medications regularly: Yes,  problems remembering to take    Medication side effects: sweating     Diet: regular (no restrictions) and diabetic    Mood- doing pretty good.  Not sure if the increased lexapro to 20mg/d is helping or not, but generally feels mood is pretty good lately.  No known se's on it.    Insomnia- using 1/2 trazodone at night- helps her get to sleep.  Fine once she gets to sleep, even with getting up for urination.    Pain- R sciatic sx's.  Tizanidine helped some for 2-3 hrs.  Addition of gabapentin, 300mg at night, after about three nights- pain from day and night was gone, so she stopped them.  Hasn't taken them since- hasn't needed to.  Also stopped the PT- wasn't helping, but she was in so much pain she couldn't hardly do anything. That pain was so exhausting.      DM- Due for A1C and albumin.  Checking sugars a few times a day, and insulin a few times a days.  Glucose readings ranging from max of 300 the day she had a rootbeer float.  Usually 120-160- always before meals.  7-day ave 159  14-day ave 150  30-day ave 152  90-day ave 150    Cardiology updates-   Did start taking the toursemide in the afternoon- getting up 1-2 times, down from 3-5 times.    Breathing- fine if she's not moving.  Main  line backed up- she went down to look into it, [employer] helped cut some rugs out, and nephew is getting more of it.  The  smell of it and going up and down affected her breathing.  Using more oxygen lately, and has to be better about taking it out.  Planning on asking Pulmonary 11/1/18- Dr. Tillman.  Very sad to be losing Dr. Walsh.    Left ankle swelling and bruising- medial malleolus, unsure if injury or accident- no recollection of one.    Groin rash- first on L side (better with the nizoral cream), now on both sides, more raw on R now.          Problem list and histories reviewed & adjusted, as indicated.  Additional history: as documented    Patient Active Problem List   Diagnosis     Temporomandibular joint disorder     Diverticulitis of colon     Disorder of bone and cartilage     Osteoarthritis     Alcohol abuse, in remission     Restless legs syndrome (RLS)     Major depressive disorder, recurrent episode, moderate (H)     Essential hypertension with goal blood pressure less than 140/90     Sciatica     Advanced directives, counseling/discussion     Colouterine fistula     Permanent atrial fibrillation (H)     Left ventricular hypertrophy     Health Care Home     Insomnia     Joint pains     Allergic reaction caused by a drug - likely plaquenil     Breast fibroadenoma     DVT (deep venous thrombosis) (H)     Fatty liver disease, nonalcoholic     Rib pain     Neck mass     Gastroesophageal reflux disease without esophagitis     Enlarged lymph node     Sjogren's syndrome with lung involvement (H)     ANGELICA (obstructive sleep apnea)     ILD (interstitial lung disease) (H)     Lower GI bleed     Acute and chronic respiratory failure with hypoxia (H)     (HFpEF) heart failure with preserved ejection fraction (H)     Type 2 diabetes mellitus with hyperglycemia, with long-term current use of insulin (H)     Heart failure, chronic, with acute decompensation (H)     Hyperlipidemia LDL goal <100     Long term current use of anticoagulant therapy     Inflammatory arthritis     Bilateral low back pain with right-sided sciatica      Current  Outpatient Prescriptions   Medication Sig Dispense Refill     acetaminophen (TYLENOL) 500 MG tablet Take 500 mg by mouth nightly as needed        acyclovir (ZOVIRAX) 400 MG tablet Take 1 tablet (400 mg) by mouth 3 times daily For a couple days 15 tablet 2     acyclovir (ZOVIRAX) 5 % ointment Apply topically 6 times daily (Patient taking differently: Apply topically 6 times daily As needed for outbreaks) 15 g 3     albuterol (PROAIR HFA, PROVENTIL HFA, VENTOLIN HFA) 108 (90 BASE) MCG/ACT inhaler Inhale 2 puffs into the lungs every 6 hours 1 Inhaler 3     atorvastatin (LIPITOR) 10 MG tablet Take 1 tablet (10 mg) by mouth daily 90 tablet 0     BD JANE U/F 32G X 4 MM insulin pen needle USE 4 DAILY AS DIRECTED. 400 each 0     blood glucose monitoring (ACCU-CHEK SHANNON PLUS) test strip Use to test blood sugar 4 times daily or as directed.  Ok to substitute alternative if insurance prefers. 120 strip 11     blood glucose monitoring (ACCU-CHEK FASTCLIX) lancets Use to test blood sugar 4 times daily or as directed.  Ok to substitute alternative if insurance prefers. 1 Box 11     blood glucose monitoring (ACCU-CHEK MULTICLIX) lancets Use to test blood sugar 4 times daily or as directed. 4 Box 3     COMPRESSION STOCKINGS Wear compression stockings in affected leg (right leg) or both legs most of the time during the day and take them off at night. 2 each 2     escitalopram (LEXAPRO) 20 MG tablet TAKE 1 TABLET (20 MG) BY MOUTH DAILY 90 tablet 1     fluticasone (FLONASE) 50 MCG/ACT nasal spray Spray 1-2 sprays into both nostrils daily (Patient taking differently: Spray 1-2 sprays into both nostrils daily as needed for allergies ) 16 g 5     fluticasone (FLOVENT HFA) 220 MCG/ACT inhaler Inhale 2 puffs into the lungs 2 times daily 3 Inhaler 3     insulin pen needle (B-D U/F) 31G X 8 MM Use 4 times daily (with Lantus and Novolog) or as directed. 400 each 3     ketoconazole (NIZORAL) 2 % cream Apply topically 2 times daily 60 g 1      LANTUS SOLOSTAR 100 UNIT/ML soln INJECT 25 UNTIS SUBCUTANEOUSLY EVERY DAY 15 mL 0     lisinopril (PRINIVIL/ZESTRIL) 2.5 MG tablet TAKE 1 TABLET (2.5 MG) BY MOUTH DAILY 90 tablet 0     metFORMIN (GLUCOPHAGE-XR) 500 MG 24 hr tablet TAKE 2 TABLETS BY MOUTH DAILY WITH DINNER 180 tablet 0     metoprolol succinate (TOPROL XL) 25 MG 24 hr tablet Take 0.5 tablets (12.5 mg) by mouth 2 times daily Take 50 mg by mouth in combination with 12.5 for a total of 62.5 twice a day 30 tablet 3     metoprolol succinate (TOPROL-XL) 100 MG 24 hr tablet Take 50 mg by mouth in combination with 12.5 for a total of 62.5 twice a day 90 tablet 3     montelukast (SINGULAIR) 10 MG tablet TAKE 1 TABLET BY MOUTH AT BEDTIME 90 tablet 0     NOVOLOG FLEXPEN 100 UNIT/ML soln INJECT 12 UNITS SUBCUTANEOUS 3 TIMES DAILY (WITH MEALS) 15 mL 0     order for DME Equipment being ordered: Full face mask for CPAP - size: F10 large 1 each 0     order for DME Oxygen: Patient requires supplemental Oxygen 4 LPM via nasal canula with activity. Please provide patient with a home unit and with portability capability. Oxygen will be for a lifetime. 1 Device 0     potassium chloride SA (K-DUR/KLOR-CON M) 20 MEQ CR tablet Take 2 tabs (40 meq) in am and 1 tab (20 meq) in pm daily 270 tablet 3     predniSONE (DELTASONE) 1 MG tablet Use with 5 mg tabs to taper: 10 mg and 9 mg every other day for 1m, 9 mg/day for 1m, 9 mg and 8 mg every other day for 1m, etc. until at 7mg 120 tablet 6     predniSONE (DELTASONE) 10 MG tablet Take 2 tablets for three days (4/12-14), then take 1 tablet daily (10 mg daily starting 4/15) 90 tablet 3     predniSONE (DELTASONE) 5 MG tablet Use with 1 mg tabs to taper: 10 mg and 9 mg every other day for 1m, 9 mg/day for 1m, 9 mg and 8 mg every other day for 1m, etc. until at 7mg 30 tablet 6     spironolactone (ALDACTONE) 25 MG tablet Take 2 tablets (50 mg) by mouth daily 180 tablet 3     torsemide (DEMADEX) 20 MG tablet Take 40 mg in the morning, 30  mg in the afternoon. 105 tablet 3     traZODone (DESYREL) 50 MG tablet Take 0.5-1 tablets (25-50 mg) by mouth nightly as needed for sleep 30 tablet 5     traZODone (DESYREL) 50 MG tablet TAKE 1/2-1 TABLET BY MOUTH NIGHTLY AS NEEDED, CAN TITRATE DOWN TO 1/2TAB OR UP TO 2TABS AS NEEDED 180 tablet 0     warfarin (COUMADIN) 7.5 MG tablet TAKE 1 TABLET BY MOUTH DAILY. CURRENT DOSE IS 7.5 MG DAILY. DOSE ADJUSTED PER INR RESULT. 90 tablet 3     ACCU-CHEK SHANNON PLUS test strip USE TO TEST BLOOD SUGARS 4 TIMES DAILY OR AS DIRECTED 400 strip 0     alendronate (FOSAMAX) 70 MG tablet Take 1 tablet (70 mg) by mouth every 7 days 4 tablet 11     gabapentin (NEURONTIN) 300 MG capsule Take 1 capsule (300 mg) by mouth At Bedtime 30 capsule 0     lidocaine (LIDODERM) 5 % Patch Apply patch to painful area for up to 12 h within a 24 h period.  Remove after 12 hours. 30 patch 0     tiZANidine (ZANAFLEX) 2 MG tablet Take 1-2 tablets (2-4 mg) by mouth 3 times daily 90 tablet 1     Allergies   Allergen Reactions     Augmentin Nausea and Vomiting     Codeine Nausea and Vomiting     Phenobarbital Itching     Seasonal Allergies      Recent Labs   Lab Test  10/02/18   1524  05/23/18   1028  04/18/18   1438   02/21/18   1230   01/31/18   1428  11/08/17   1432   10/06/17   1421   08/01/17   1146   06/22/17   1700   A1C  6.7*  6.9*   --    --    --    --   7.0*   --    --   7.3*   --    --    < >   --    LDL   --   21   --    --   5   --    --    --    --    --    --    --    --   13   HDL   --   53   --    --   64   --    --    --    --    --    --    --    --   62   TRIG   --   113   --    --   188*   --    --    --    --    --    --    --    --   132   ALT   --   21   --    --    --    --    --    --    --   43   --   45   --    --    CR   --   0.90  0.90   < >  0.88   < >  0.96   --    < >  0.94   < >  0.90   < >  0.80   GFRESTIMATED   --   61  60*   < >  62   < >  56*   --    < >  58*   < >  61   < >  69   GFRESTBLACK   --   74  73   < >  76   " < >  68   --    < >  70   < >  74   < >  84   POTASSIUM   --   3.5  4.3   < >  3.9   < >  4.5   --    < >  4.3   < >  3.5   < >  3.0*   TSH   --   2.42   --    --    --    --    --   1.40   --    --    --    --    --    --     < > = values in this interval not displayed.      BP Readings from Last 3 Encounters:   10/02/18 100/69   08/17/18 111/73   07/25/18 105/73    Wt Readings from Last 3 Encounters:   10/02/18 212 lb (96.2 kg)   08/17/18 212 lb (96.2 kg)   07/25/18 212 lb (96.2 kg)           Labs reviewed in EPIC    Reviewed and updated as needed this visit by clinical staff  Tobacco  Allergies  Meds  Problems       Reviewed and updated as needed this visit by Provider  Allergies  Meds  Problems         ROS:  Constitutional, HEENT, cardiovascular, pulmonary, GI, , musculoskeletal, neuro, skin, endocrine and psych systems are negative, except as otherwise noted.    OBJECTIVE:     /69  Pulse 68  Temp 98.3  F (36.8  C) (Oral)  Ht 5' 7\" (1.702 m)  Wt 212 lb (96.2 kg)  SpO2 100%  BMI 33.2 kg/m2  Body mass index is 33.2 kg/(m^2).  GENERAL APPEARANCE: healthy, alert and no distress     EYES: PERRL, sclera clear     HENT: nose and mouth without ulcers or lesions     NECK: no adenopathy, no asymmetry, masses, or scars and thyroid normal to palpation     RESP: lungs clear to auscultation - no rales, rhonchi or wheezes     CV: regular rates and rhythm, normal S1 S2, no S3 or S4 and no murmur, click or rub      Abdomen: soft, nontender, no HSM or masses and bowel sounds normal     Ext: warm, dry, no edema      Psych: full range affect, normal speech and grooming, judgement and insight intact      SKIN: erythematous macules/papules in bilateral groin area, right worse than left        ASSESSMENT/PLAN:       ICD-10-CM    1. Bilateral low back pain with right-sided sciatica, unspecified chronicity M54.41    2. Type 2 diabetes mellitus with hyperglycemia, with long-term current use of insulin (H) E11.65 " Hemoglobin A1c    Z79.4 Albumin Random Urine Quantitative with Creat Ratio   3. Major depressive disorder, recurrent episode, moderate (H) F33.1    4. Insomnia due to medical condition G47.01 traZODone (DESYREL) 50 MG tablet   5. Groin rash R21    6. ILD (interstitial lung disease) (H) J84.9    7. Primary Sjogren's syndrome (H) M35.00    8. Permanent atrial fibrillation (H) I48.2 CANCELED: INR point of care   9. Long term current use of anticoagulant therapy Z79.01 CANCELED: INR point of care     --Pain/R sciatic sx's- these had been at her last visit, and severe afterwards, so she called in for recommendations.  She reports that the sx's were interestingly much better after a few days of both the tizanadine and gabapentin, so stopped both and sx's have not returned.  Very glad to hear, and glad she stopped the medications and did not need to go back on them.      --DM- good control on current regimen.  A1C at 6.7 today, down from 6.9.  Full set of labs done in 5/18, normal.  BP in good control, possibly too low.  Will cont to monitor closely.    --Mood- much better, likely due to the improved pain, possibly due to lexapro increase to 20mg/d a few months ago.    --Insomnia- using 1/2 trazodone at night- helps her get to sleep.  Fine once she gets to sleep, even with getting up for urination.    --Groin rash- now better on left side, but worse on R groin area- discussed continuing use of the ketoconazole, but also washing and drying the area with a hair dryer, and using cotton to keep the skin folds apart.    --Pulmonary f/u- breathing is stable, still needs oxygen if she's moving/doing much, but is still resistant to using it.  Will talk with pulmonary at 11/18 appointment about potentially getting a more portable option.    --Cardiology- brief discussion of her switching to take the toursemide in afternoon instead of evening, and she does feel that it's helped her nocturia sx's- now getting up 1-2 times, down from  3-5 times a night.      F/u in ~2 months (with 45 min visit)- f/u pulmonary/cardiology/rheum updates- did not discuss today.      Ilene Tristan MD  [facility]

## 2018-10-03 LAB
CREAT UR-MCNC: 28 MG/DL
MICROALBUMIN UR-MCNC: 8 MG/L
MICROALBUMIN/CREAT UR: 29.14 MG/G CR (ref 0–25)

## 2018-10-03 ASSESSMENT — PATIENT HEALTH QUESTIONNAIRE - PHQ9: SUM OF ALL RESPONSES TO PHQ QUESTIONS 1-9: 4

## 2018-10-03 ASSESSMENT — ANXIETY QUESTIONNAIRES: GAD7 TOTAL SCORE: 4

## 2018-10-04 NOTE — PROGRESS NOTES
Here are your lab results from your recent visit...  --Your hemoglobin A1C (the three month average of your glucose levels) look great, and a bit improved at 6.7.  --Your microalbumin level (which is the urine test that can signal signs of early chronic kidney disease if elevated to >30) is stable at 29.    Please let me know if you have any questions.  Best,   Lev Tristan MD

## 2018-10-05 DIAGNOSIS — E11.9 TYPE 2 DIABETES MELLITUS WITHOUT COMPLICATION, WITHOUT LONG-TERM CURRENT USE OF INSULIN (H): ICD-10-CM

## 2018-10-08 DIAGNOSIS — I50.30 (HFPEF) HEART FAILURE WITH PRESERVED EJECTION FRACTION (H): ICD-10-CM

## 2018-10-08 RX ORDER — BLOOD SUGAR DIAGNOSTIC
STRIP MISCELLANEOUS
Qty: 400 STRIP | Refills: 0 | Status: SHIPPED | OUTPATIENT
Start: 2018-10-08 | End: 2019-02-17

## 2018-10-08 NOTE — TELEPHONE ENCOUNTER
"Prescription approved per [facility] Refill Protocol.  Lydia HALEY, RN    Requested Prescriptions   Pending Prescriptions Disp Refills     ACCU-CHEK SHANNON PLUS test strip [Pharmacy Med Name: ACCU-CHEK SHANNON PLUS TEST STRP] 400 strip 0     Sig: USE TO TEST BLOOD SUGARS 4 TIMES DAILY OR AS DIRECTED    Diabetic Supplies Protocol Passed    10/5/2018 10:35 PM       Passed - Patient is 18 years of age or older       Passed - Recent (6 mo) or future (30 days) visit within the authorizing provider's specialty    Patient had office visit in the last 6 months or has a visit in the next 30 days with authorizing provider.  See \"Patient Info\" tab in inbasket, or \"Choose Columns\" in Meds & Orders section of the refill encounter.            Next 5 appointments (look out 90 days)     Nov 28, 2018 12:45 PM CST   Office Visit with Ilene Tristan MD   [facility] ([facility])    [address] [address] [phone]                  "

## 2018-10-09 ENCOUNTER — MYC MEDICAL ADVICE (OUTPATIENT)
Dept: FAMILY MEDICINE | Facility: CLINIC | Age: 78
End: 2018-10-09

## 2018-10-09 DIAGNOSIS — E11.65 TYPE 2 DIABETES MELLITUS WITH HYPERGLYCEMIA, WITH LONG-TERM CURRENT USE OF INSULIN (H): ICD-10-CM

## 2018-10-09 DIAGNOSIS — Z79.4 TYPE 2 DIABETES MELLITUS WITH HYPERGLYCEMIA, WITH LONG-TERM CURRENT USE OF INSULIN (H): ICD-10-CM

## 2018-10-10 RX ORDER — TORSEMIDE 20 MG/1
TABLET ORAL
Qty: 105 TABLET | Refills: 3 | Status: SHIPPED | OUTPATIENT
Start: 2018-10-10 | End: 2019-03-18

## 2018-10-10 NOTE — TELEPHONE ENCOUNTER
See msg back to pt.  Sent the basaglar insulin new rx via her last mychart encounter.  Will close both encounters.  CW

## 2018-10-12 DIAGNOSIS — E11.69 TYPE 2 DIABETES MELLITUS WITH OTHER SPECIFIED COMPLICATION (H): ICD-10-CM

## 2018-10-12 RX ORDER — INSULIN ASPART 100 [IU]/ML
INJECTION, SOLUTION INTRAVENOUS; SUBCUTANEOUS
Qty: 15 ML | Refills: 1 | Status: SHIPPED | OUTPATIENT
Start: 2018-10-12 | End: 2019-06-18

## 2018-10-12 NOTE — TELEPHONE ENCOUNTER
"Prescription approved per [facility] Refill Protocol.  Lydia HALEY RN    Requested Prescriptions   Pending Prescriptions Disp Refills     NOVOLOG FLEXPEN 100 UNIT/ML soln [Pharmacy Med Name: NOVOLOG 100 UNITS/ML FLEXPEN]  0     Sig: INJECT 12 UNITS SUBCUTANEOUS 3 TIMES DAILY (WITH MEALS)    Short Acting Insulin Protocol Passed    10/12/2018  1:56 AM       Passed - Blood pressure less than 140/90 in past 6 months    BP Readings from Last 3 Encounters:   10/02/18 100/69   08/17/18 111/73   07/25/18 105/73                Passed - LDL on file in past 12 months    Recent Labs   Lab Test  05/23/18   1028   LDL  21            Passed - Microalbumin on file in past 12 months    Recent Labs   Lab Test  10/02/18   1524   MICROL  8   UMALCR  29.14*            Passed - Serum creatinine on file in past 12 months    Recent Labs   Lab Test  05/23/18   1028   04/02/17   2213   CR  0.90   < >   --    CREAT   --    --   0.6    < > = values in this interval not displayed.            Passed - HgbA1C in past 3 or 6 months    If HgbA1C is 8 or greater, it needs to be on file within the past 3 months.  If less than 8, must be on file within the past 6 months.     Recent Labs   Lab Test  10/02/18   1524   A1C  6.7*            Passed - Patient is age 18 or older       Passed - Recent (6 mo) or future (30 days) visit within the authorizing provider's specialty    Patient had office visit in the last 6 months or has a visit in the next 30 days with authorizing provider or within the authorizing provider's specialty.  See \"Patient Info\" tab in inbasket, or \"Choose Columns\" in Meds & Orders section of the refill encounter.            Next 5 appointments (look out 90 days)     Nov 28, 2018 12:45 PM CST   Office Visit with Ilene Tristan MD   [facility] ([facility])    [address] [address] [phone]                  "

## 2018-10-15 PROBLEM — M54.41 BILATERAL LOW BACK PAIN WITH RIGHT-SIDED SCIATICA: Status: RESOLVED | Noted: 2018-08-21 | Resolved: 2018-10-15

## 2018-10-15 NOTE — PROGRESS NOTES
Patient did not return for follow up treatments as directed.  Goal status and current objective information is therefore unknown.  Discharge from PT services at this time for this episode of treatment. Please see attached documentation under this episode of care for further information including dates of service, start of care date, referring physician, Dx, treatment plan, treatments, etc.    Please contact me with any questions or concerns.    Thank you for your referral.    Beatriz Mercer, PT, DPT

## 2018-10-17 DIAGNOSIS — I50.32 CHRONIC DIASTOLIC HEART FAILURE (H): Primary | ICD-10-CM

## 2018-10-19 DIAGNOSIS — J84.9 ILD (INTERSTITIAL LUNG DISEASE) (H): ICD-10-CM

## 2018-10-19 DIAGNOSIS — M35.02 SJOGREN'S SYNDROME WITH LUNG INVOLVEMENT (H): ICD-10-CM

## 2018-10-19 DIAGNOSIS — I10 ESSENTIAL HYPERTENSION WITH GOAL BLOOD PRESSURE LESS THAN 140/90: ICD-10-CM

## 2018-10-19 RX ORDER — MONTELUKAST SODIUM 10 MG/1
TABLET ORAL
Qty: 90 TABLET | Refills: 0 | Status: SHIPPED | OUTPATIENT
Start: 2018-10-19 | End: 2019-01-22

## 2018-10-19 RX ORDER — LISINOPRIL 2.5 MG/1
TABLET ORAL
Qty: 90 TABLET | Refills: 0 | Status: SHIPPED | OUTPATIENT
Start: 2018-10-19 | End: 2018-10-22

## 2018-10-19 NOTE — TELEPHONE ENCOUNTER
"Prescription approved per [facility] Refill Protocol.  Sophia Hemphill RN    Requested Prescriptions   Signed Prescriptions Disp Refills     lisinopril (PRINIVIL/ZESTRIL) 2.5 MG tablet 90 tablet 0     Sig: TAKE 1 TABLET BY MOUTH EVERY DAY    ACE Inhibitors (Including Combos) Protocol Passed    10/19/2018  2:41 AM       Passed - Blood pressure under 140/90 in past 12 months    BP Readings from Last 3 Encounters:   10/02/18 100/69   08/17/18 111/73   07/25/18 105/73                Passed - Recent (12 mo) or future (30 days) visit within the authorizing provider's specialty    Patient had office visit in the last 12 months or has a visit in the next 30 days with authorizing provider or within the authorizing provider's specialty.  See \"Patient Info\" tab in inbasket, or \"Choose Columns\" in Meds & Orders section of the refill encounter.             Passed - Patient is age 18 or older       Passed - No active pregnancy on record       Passed - Normal serum creatinine on file in past 12 months    Recent Labs   Lab Test  05/23/18   1028   04/02/17   2213   CR  0.90   < >   --    CREAT   --    --   0.6    < > = values in this interval not displayed.            Passed - Normal serum potassium on file in past 12 months    Recent Labs   Lab Test  05/23/18   1028   POTASSIUM  3.5            Passed - No positive pregnancy test in past 12 months        montelukast (SINGULAIR) 10 MG tablet 90 tablet 0     Sig: TAKE 1 TABLET BY MOUTH EVERYDAY AT BEDTIME    Leukotriene Inhibitors Protocol Passed    10/19/2018  2:41 AM       Passed - Patient is age 12 or older    If patient is under 16, ok to refill using age based dosing.          Passed - Recent (12 mo) or future (30 days) visit within the authorizing provider's specialty    Patient had office visit in the last 12 months or has a visit in the next 30 days with authorizing provider or within the authorizing provider's specialty.  See \"Patient Info\" tab in inbasket, or \"Choose Columns\" in Meds " & Orders section of the refill encounter.

## 2018-10-22 ENCOUNTER — OFFICE VISIT (OUTPATIENT)
Dept: CARDIOLOGY | Facility: CLINIC | Age: 78
End: 2018-10-22
Attending: NURSE PRACTITIONER
Payer: MEDICARE

## 2018-10-22 ENCOUNTER — TELEPHONE (OUTPATIENT)
Dept: CARDIOLOGY | Facility: CLINIC | Age: 78
End: 2018-10-22

## 2018-10-22 ENCOUNTER — PATIENT OUTREACH (OUTPATIENT)
Dept: CARE COORDINATION | Facility: CLINIC | Age: 78
End: 2018-10-22

## 2018-10-22 VITALS
BODY MASS INDEX: 33.21 KG/M2 | OXYGEN SATURATION: 93 % | WEIGHT: 211.6 LBS | SYSTOLIC BLOOD PRESSURE: 110 MMHG | HEIGHT: 67 IN | DIASTOLIC BLOOD PRESSURE: 80 MMHG | HEART RATE: 73 BPM

## 2018-10-22 DIAGNOSIS — Z23 FLU VACCINE NEED: ICD-10-CM

## 2018-10-22 DIAGNOSIS — I50.30 (HFPEF) HEART FAILURE WITH PRESERVED EJECTION FRACTION (H): ICD-10-CM

## 2018-10-22 DIAGNOSIS — I10 ESSENTIAL HYPERTENSION WITH GOAL BLOOD PRESSURE LESS THAN 140/90: ICD-10-CM

## 2018-10-22 DIAGNOSIS — I48.21 PERMANENT ATRIAL FIBRILLATION (H): Primary | ICD-10-CM

## 2018-10-22 DIAGNOSIS — I48.19 PERSISTENT ATRIAL FIBRILLATION (H): ICD-10-CM

## 2018-10-22 DIAGNOSIS — J84.9 ILD (INTERSTITIAL LUNG DISEASE) (H): Primary | ICD-10-CM

## 2018-10-22 DIAGNOSIS — I48.91 ATRIAL FIBRILLATION WITH CONTROLLED VENTRICULAR RESPONSE (H): ICD-10-CM

## 2018-10-22 DIAGNOSIS — I50.32 CHRONIC DIASTOLIC HEART FAILURE (H): ICD-10-CM

## 2018-10-22 LAB
ANION GAP SERPL CALCULATED.3IONS-SCNC: 7 MMOL/L (ref 3–14)
BUN SERPL-MCNC: 22 MG/DL (ref 7–30)
CALCIUM SERPL-MCNC: 9.1 MG/DL (ref 8.5–10.1)
CHLORIDE SERPL-SCNC: 101 MMOL/L (ref 94–109)
CO2 SERPL-SCNC: 27 MMOL/L (ref 20–32)
CREAT SERPL-MCNC: 0.97 MG/DL (ref 0.52–1.04)
GFR SERPL CREATININE-BSD FRML MDRD: 55 ML/MIN/1.7M2
GLUCOSE SERPL-MCNC: 180 MG/DL (ref 70–99)
INR PPP: 2.08 (ref 0.86–1.14)
POTASSIUM SERPL-SCNC: 4.2 MMOL/L (ref 3.4–5.3)
SODIUM SERPL-SCNC: 136 MMOL/L (ref 133–144)

## 2018-10-22 PROCEDURE — 80048 BASIC METABOLIC PNL TOTAL CA: CPT | Performed by: NURSE PRACTITIONER

## 2018-10-22 PROCEDURE — 90662 IIV NO PRSV INCREASED AG IM: CPT | Mod: ZF | Performed by: NURSE PRACTITIONER

## 2018-10-22 PROCEDURE — G0463 HOSPITAL OUTPT CLINIC VISIT: HCPCS | Mod: ZF

## 2018-10-22 PROCEDURE — 25000128 H RX IP 250 OP 636: Mod: ZF | Performed by: NURSE PRACTITIONER

## 2018-10-22 PROCEDURE — 36415 COLL VENOUS BLD VENIPUNCTURE: CPT | Performed by: NURSE PRACTITIONER

## 2018-10-22 PROCEDURE — 85610 PROTHROMBIN TIME: CPT | Performed by: NURSE PRACTITIONER

## 2018-10-22 PROCEDURE — 99214 OFFICE O/P EST MOD 30 MIN: CPT | Mod: ZP | Performed by: NURSE PRACTITIONER

## 2018-10-22 PROCEDURE — G0008 ADMIN INFLUENZA VIRUS VAC: HCPCS

## 2018-10-22 RX ORDER — METOPROLOL SUCCINATE 25 MG/1
12.5 TABLET, EXTENDED RELEASE ORAL 2 TIMES DAILY
Qty: 90 TABLET | Refills: 3 | Status: SHIPPED | OUTPATIENT
Start: 2018-10-22 | End: 2019-11-20

## 2018-10-22 RX ORDER — METOPROLOL SUCCINATE 100 MG/1
TABLET, EXTENDED RELEASE ORAL
Qty: 90 TABLET | Refills: 3 | Status: SHIPPED | OUTPATIENT
Start: 2018-10-22 | End: 2020-01-17

## 2018-10-22 RX ORDER — LISINOPRIL 2.5 MG/1
2.5 TABLET ORAL DAILY
Qty: 90 TABLET | Refills: 2 | Status: SHIPPED | OUTPATIENT
Start: 2018-10-22 | End: 2019-02-14

## 2018-10-22 RX ADMIN — INFLUENZA A VIRUS A/MICHIGAN/45/2015 X-275 (H1N1) ANTIGEN (FORMALDEHYDE INACTIVATED), INFLUENZA A VIRUS A/SINGAPORE/INFIMH-16-0019/2016 IVR-186 (H3N2) ANTIGEN (FORMALDEHYDE INACTIVATED), AND INFLUENZA B VIRUS B/MARYLAND/15/2016 BX-69A (A B/COLORADO/6/2017-LIKE VIRUS) ANTIGEN (FORMALDEHYDE INACTIVATED) 0.5 ML: 60; 60; 60 INJECTION, SUSPENSION INTRAMUSCULAR at 15:43

## 2018-10-22 ASSESSMENT — PAIN SCALES - GENERAL: PAINLEVEL: NO PAIN (0)

## 2018-10-22 NOTE — MR AVS SNAPSHOT
After Visit Summary   10/22/2018    Betty Kennedy    MRN: [id number]           Patient Information     Date Of Birth          1940        Visit Information        Provider Department      10/22/2018 1:00 PM Kiesha Elias APRN CNP [facility]        Today's Diagnoses     Permanent atrial fibrillation (H)    -  1    (HFpEF) heart failure with preserved ejection fraction (H)        Essential hypertension with goal blood pressure less than 140/90        Persistent atrial fibrillation (H)        Atrial fibrillation with controlled ventricular response (H)        Flu vaccine need          Care Instructions    You were seen today in the Cardiovascular Clinic at the [facility].     Cardiology Providers you saw during your visit: Kiesha MEDEL CNP      Follow up and medication changes:    1. No medication changes  2. Follow up with Dr Rosa as scheduled in 4 months with echo prior.     Results for BETTY KENNEDY (MRN [id number]) as of 10/22/2018 13:22   Ref. Range 10/22/2018 12:46   Sodium Latest Ref Range: 133 - 144 mmol/L 136   Potassium Latest Ref Range: 3.4 - 5.3 mmol/L 4.2   Chloride Latest Ref Range: 94 - 109 mmol/L 101   Carbon Dioxide Latest Ref Range: 20 - 32 mmol/L 27   Urea Nitrogen Latest Ref Range: 7 - 30 mg/dL 22   Creatinine Latest Ref Range: 0.52 - 1.04 mg/dL 0.97   GFR Estimate Latest Ref Range: >60 mL/min/1.7m2 55 (L)   GFR Estimate If Black Latest Ref Range: >60 mL/min/1.7m2 67   Calcium Latest Ref Range: 8.5 - 10.1 mg/dL 9.1   Anion Gap Latest Ref Range: 3 - 14 mmol/L 7   Glucose Latest Ref Range: 70 - 99 mg/dL 180 (H)           Please limit your fluid intake to 2 L (68 ounces) daily.  2 Liters a day = 8.5 cups, or 68 ounces.  Please limit your salt intake to 2 grams a day or less.     If you gain 2# in 24 hours or 5# in one week call Beatriz Blanco RN so we can adjust your medications as needed over the phone.     Please feel free to call  "me with any questions or concerns.       Beatriz Blanco, RN CHFN  [facility]  Cardiology Care Coordinator-Heart Failure Clinic     Questions and [phone].   First press #1 for the [employer] and then press #3 for \"Medical Questions\" to reach us Cardiology Nurses.      On Call Cardiologist for after hours or on weekends: [phone]   option #4 and ask to speak to the on-call Cardiologist. Inform them you are a CORE/heart failure patient at the [facility].           If you need a medication refill please contact your pharmacy.  Please allow 3 business days for your refill to be completed.  _______________________________________________________  C.O.R.E. CLINIC Cardiomyopathy, Optimization, Rehabilitation, Education   The C.O.R.E. CLINIC is a heart failure specialty clinic within the [facility] where you will work with specialized nurse practitioners dedicated to helping patients with heart failure carefully adjust medications, receive education, and learn who and when to call if symptoms develop. They specialize in helping you better understand your condition, slow the progression of your disease, improve the length and quality of your life, help you detect future heart problems before they become life threatening, and avoid hospitalizations.  As always, thank you for trusting us with your health care needs!             Follow-ups after your visit        Additional Services     Follow-Up with Advanced Heart Failure Cardiologist                 Your next 10 appointments already scheduled     2018  8:00 AM CDT   FULL PULMONARY FUNCTION with  PFL B   [facility] Pulmonary Function Testing ([facility])    [address] [id number] [phone]            2018  9:00 AM CDT   (Arrive by 8:45 AM)   Return Interstitial Lung with Maryjane Tillman MD   [facility] ([facility])    [address]  [address]  [facility] [address]   [phone]            Nov 28, 2018 12:45 PM CST   Office Visit with Ilene Tristan MD   [facility] ([facility])    [address]  [facility] [id number] [phone]           Bring a current list of meds and any records pertaining to this visit. For Physicals, please bring immunization records and any forms needing to be filled out. Please arrive 10 minutes early to complete paperwork.            Dec 06, 2018  3:00 PM CST   (Arrive by 2:45 PM)   Return Visit with Carmenza Stringer MD   [facility] ([facility])    [address] [place] [address]  [address]  [facility] [address] [phone]            Feb 14, 2019  2:00 PM CST   Ech Complete with UCECHCR2   [facility] Echo ([facility])    [address] [place] [address]  3rd Floor  [facility] [address] [phone]           1.  Please bring or wear a comfortable two-piece outfit. 2.  You may eat, drink and take your normal medicines. 3.  For any questions that cannot be answered, please contact the ordering physician 4.  Please do not wear perfumes or scented lotions on the day of your exam.            Feb 14, 2019  3:00 PM CST   Lab with  LAB    [facility] ([facility])    [address] [place] [address]  1st Floor  [facility] [address] [phone]            Feb 14, 2019  3:30 PM CST   (Arrive by 3:15 PM)   RETURN HEART FAILURE with Radha Rosa MD   [facility] ([facility])    [address] [place] [address]  [address]  [facility] [address] [phone]              Future tests that were ordered for you today     Open Future Orders        [facility] Expected Expires Ordered    Follow-Up with Advanced Heart Failure Cardiologist Routine 1/22/2019 1/27/2019 10/22/2018           "  Who to contact     If you have questions or need follow up information about today's clinic visit or your schedule please contact [facility] directly at [phone].  Normal or non-critical lab and imaging results will be communicated to you by Phloronolhart, letter or phone within 4 business days after the clinic has received the results. If you do not hear from us within 7 days, please contact the clinic through Phloronolhart or phone. If you have a critical or abnormal lab result, we will notify you by phone as soon as possible.  Submit refill requests through Shompton or call your pharmacy and they will forward the refill request to us. Please allow 3 business days for your refill to be completed.          Additional Information About Your Visit        Shompton Information     Shompton gives you secure access to your electronic health record. If you see a primary care provider, you can also send messages to your care team and make appointments. If you have questions, please call your primary care clinic.  If you do not have a primary care provider, please call [phone] and they will assist you.        Care EveryWhere ID     This is your Care EveryWhere ID. This could be used by other organizations to access your [facility] medical records  [id number]        Your Vitals Were     Pulse Height Pulse Oximetry BMI (Body Mass Index)          73 1.702 m (5' 7\") 93% 33.14 kg/m2         Blood Pressure from Last 3 Encounters:   10/22/18 110/80   10/02/18 100/69   08/17/18 111/73    Weight from Last 3 Encounters:   10/22/18 96 kg (211 lb 9.6 oz)   10/02/18 96.2 kg (212 lb)   08/17/18 96.2 kg (212 lb)              We Performed the Following     Follow-Up with [facility] Clinic     INR          Today's Medication Changes          These changes are accurate as of 10/22/18  2:05 PM.  If you have any questions, ask your nurse or doctor.               These medicines have changed or have updated prescriptions.        Dose/Directions "    acyclovir 5 % ointment   Commonly known as:  ZOVIRAX   This may have changed:  additional instructions   Used for:  Recurrent cold sores        Apply topically 6 times daily   Quantity:  15 g   Refills:  3       fluticasone 50 MCG/ACT spray   Commonly known as:  FLONASE   This may have changed:    - when to take this  - reasons to take this   Used for:  Seasonal allergic rhinitis        Dose:  1-2 spray   Spray 1-2 sprays into both nostrils daily   Quantity:  16 g   Refills:  5       lisinopril 2.5 MG tablet   Commonly known as:  PRINIVIL/Zestril   This may have changed:  See the new instructions.   Used for:  Essential hypertension with goal blood pressure less than 140/90   Changed by:  Kiesha Elias APRN CNP        Dose:  2.5 mg   Take 1 tablet (2.5 mg) by mouth daily   Quantity:  90 tablet   Refills:  2            Where to get your medicines      These medications were sent to [facility]/pharmacy #[id number] - [place] [address] [address] [address]     Phone:  [phone]     lisinopril 2.5 MG tablet    metoprolol succinate 100 MG 24 hr tablet    metoprolol succinate 25 MG 24 hr tablet                Primary Care Provider Office Phone # Fax #    Ilene Tristan [phone]       [facility] [address] [address]        Equal Access to Services     DICK [facility] : Hadii aad ku hadasho Soomaali, waaxda luqadaha, qaybta kaalmada adeegyada, anderson shaikh. So [facility] [phone].    ATENCIÓN: Si habla español, tiene a conti disposición servicios gratuitos de asistencia lingüística. Noel al [phone].    We comply with applicable federal civil rights laws and [place] laws. We do not discriminate on the basis of race, color, national origin, age, disability, sex, sexual orientation, or gender identity.            Thank you!     Thank you for choosing [facility]  for your care. Our goal is  always to provide you with excellent care. Hearing back from our patients is one way we can continue to improve our services. Please take a few minutes to complete the written survey that you may receive in the mail after your visit with us. Thank you!             Your Updated Medication List - Protect others around you: Learn how to safely use, store and throw away your medicines at www.disposemymeds.org.          This list is accurate as of 10/22/18  2:05 PM.  Always use your most recent med list.                   Brand Name Dispense Instructions for use Diagnosis    acyclovir 400 MG tablet    ZOVIRAX    15 tablet    Take 1 tablet (400 mg) by mouth 3 times daily For a couple days    Herpes labialis       acyclovir 5 % ointment    ZOVIRAX    15 g    Apply topically 6 times daily    Recurrent cold sores       albuterol 108 (90 Base) MCG/ACT inhaler    PROAIR HFA/PROVENTIL HFA/VENTOLIN HFA    1 Inhaler    Inhale 2 puffs into the lungs every 6 hours    ILD (interstitial lung disease) (H)       alendronate 70 MG tablet    FOSAMAX    4 tablet    Take 1 tablet (70 mg) by mouth every 7 days    Other osteoporosis without current pathological fracture       atorvastatin 10 MG tablet    LIPITOR    90 tablet    Take 1 tablet (10 mg) by mouth daily    Hyperlipidemia LDL goal <100       BASAGLAR 100 UNIT/ML injection     15 mL    Inject 25 Units Subcutaneous daily (to replace lantus)    Type 2 diabetes mellitus with hyperglycemia, with long-term current use of insulin (H)       * blood glucose monitoring lancets     4 Box    Use to test blood sugar 4 times daily or as directed.    Type 2 diabetes mellitus without complication, without long-term current use of insulin (H)       * blood glucose monitoring lancets     1 Box    Use to test blood sugar 4 times daily or as directed.  Ok to substitute alternative if insurance prefers.    Type 2 diabetes mellitus without complication, without long-term current use of insulin (H)        * blood glucose monitoring test strip    ACCU-CHEK SHANNON PLUS    120 strip    Use to test blood sugar 4 times daily or as directed.  Ok to substitute alternative if insurance prefers.    Type 2 diabetes mellitus without complication, without long-term current use of insulin (H)       * ACCU-CHEK SHANNON PLUS test strip   Generic drug:  blood glucose monitoring     400 strip    USE TO TEST BLOOD SUGARS 4 TIMES DAILY OR AS DIRECTED    Type 2 diabetes mellitus without complication, without long-term current use of insulin (H)       COMPRESSION STOCKINGS     2 each    Wear compression stockings in affected leg (right leg) or both legs most of the time during the day and take them off at night.    DVT (deep venous thrombosis), right, Postphlebitic syndrome, Chronic anticoagulation, Atrial fibrillation (H)       escitalopram 20 MG tablet    LEXAPRO    90 tablet    TAKE 1 TABLET (20 MG) BY MOUTH DAILY    Major depressive disorder, recurrent episode, moderate (H)       fluticasone 220 MCG/ACT Inhaler    FLOVENT HFA    3 Inhaler    Inhale 2 puffs into the lungs 2 times daily    ILD (interstitial lung disease) (H), Follicular bronchiolitis (H)       fluticasone 50 MCG/ACT spray    FLONASE    16 g    Spray 1-2 sprays into both nostrils daily    Seasonal allergic rhinitis       gabapentin 300 MG capsule    NEURONTIN    30 capsule    Take 1 capsule (300 mg) by mouth At Bedtime    Sciatica, unspecified laterality       * insulin pen needle 31G X 8 MM    B-D U/F    400 each    Use 4 times daily (with Lantus and Novolog) or as directed.    Type 2 diabetes mellitus without complication, without long-term current use of insulin (H)       * BD JANE U/F 32G X 4 MM   Generic drug:  insulin pen needle     400 each    USE 4 DAILY AS DIRECTED.    Type 2 diabetes mellitus without complication, without long-term current use of insulin (H)       ketoconazole 2 % cream    NIZORAL    60 g    Apply topically 2 times daily    Cutaneous candidiasis        lidocaine 5 % Patch    LIDODERM    30 patch    Apply patch to painful area for up to 12 h within a 24 h period.  Remove after 12 hours.    Sacral back pain       lisinopril 2.5 MG tablet    PRINIVIL/Zestril    90 tablet    Take 1 tablet (2.5 mg) by mouth daily    Essential hypertension with goal blood pressure less than 140/90       metFORMIN 500 MG 24 hr tablet    GLUCOPHAGE-XR    180 tablet    TAKE 2 TABLETS BY MOUTH DAILY WITH DINNER    Type 2 diabetes mellitus without complication, without long-term current use of insulin (H)       * metoprolol succinate 25 MG 24 hr tablet    TOPROL XL    90 tablet    Take 0.5 tablets (12.5 mg) by mouth 2 times daily Take 50 mg by mouth in combination with 12.5 for a total of 62.5 twice a day    (HFpEF) heart failure with preserved ejection fraction (H), Persistent atrial fibrillation (H)       * metoprolol succinate 100 MG 24 hr tablet    TOPROL-XL    90 tablet    Take 50 mg by mouth in combination with 12.5 for a total of 62.5 twice a day    Atrial fibrillation with controlled ventricular response (H)       montelukast 10 MG tablet    SINGULAIR    90 tablet    TAKE 1 TABLET BY MOUTH EVERYDAY AT BEDTIME    Sjogren's syndrome with lung involvement (H), ILD (interstitial lung disease) (H)       NovoLOG FLEXPEN 100 UNIT/ML injection   Generic drug:  insulin aspart     15 mL    INJECT 12 UNITS SUBCUTANEOUS 3 TIMES DAILY (WITH MEALS)    Type 2 diabetes mellitus with other specified complication (H)       * order for DME     1 Device    Oxygen: Patient requires supplemental Oxygen 4 LPM via nasal canula with activity. Please provide patient with a home unit and with portability capability. Oxygen will be for a lifetime.    Hypoxia, ILD (interstitial lung disease) (H)       * order for DME     1 each    Equipment being ordered: Full face mask for CPAP - size: F10 large    ANGELICA (obstructive sleep apnea)       potassium chloride SA 20 MEQ CR tablet    K-DUR/KLOR-CON M    270 tablet     Take 2 tabs (40 meq) in am and 1 tab (20 meq) in pm daily    (HFpEF) heart failure with preserved ejection fraction (H)       * predniSONE 10 MG tablet    DELTASONE    90 tablet    Take 2 tablets for three days (4/12-14), then take 1 tablet daily (10 mg daily starting 4/15)    ILD (interstitial lung disease) (H), Sjogren's syndrome with lung involvement (H)       * predniSONE 1 MG tablet    DELTASONE    120 tablet    Use with 5 mg tabs to taper: 10 mg and 9 mg every other day for 1m, 9 mg/day for 1m, 9 mg and 8 mg every other day for 1m, etc. until at 7mg    Primary Sjogren's syndrome (H)       * predniSONE 5 MG tablet    DELTASONE    30 tablet    Use with 1 mg tabs to taper: 10 mg and 9 mg every other day for 1m, 9 mg/day for 1m, 9 mg and 8 mg every other day for 1m, etc. until at 7mg    Primary Sjogren's syndrome (H)       spironolactone 25 MG tablet    ALDACTONE    180 tablet    Take 2 tablets (50 mg) by mouth daily    Chronic diastolic congestive heart failure (H)       tiZANidine 2 MG tablet    ZANAFLEX    90 tablet    Take 1-2 tablets (2-4 mg) by mouth 3 times daily    Right-sided low back pain with right-sided sciatica, unspecified chronicity       torsemide 20 MG tablet    DEMADEX    105 tablet    Take 40 mg in the morning, 30 mg in the afternoon.    (HFpEF) heart failure with preserved ejection fraction (H)       * traZODone 50 MG tablet    DESYREL    180 tablet    TAKE 1/2-1 TABLET BY MOUTH NIGHTLY AS NEEDED, CAN TITRATE DOWN TO 1/2TAB OR UP TO 2TABS AS NEEDED    Insomnia due to medical condition       * traZODone 50 MG tablet    DESYREL    30 tablet    Take 0.5-1 tablets (25-50 mg) by mouth nightly as needed for sleep    Insomnia due to medical condition       TYLENOL 500 MG tablet   Generic drug:  acetaminophen      Take 500 mg by mouth nightly as needed    Dizziness       warfarin 7.5 MG tablet    COUMADIN    90 tablet    TAKE 1 TABLET BY MOUTH DAILY. CURRENT DOSE IS 7.5 MG DAILY. DOSE ADJUSTED PER  INR RESULT.    Atrial fibrillation with controlled ventricular response (H)       * Notice:  This list has 15 medication(s) that are the same as other medications prescribed for you. Read the directions carefully, and ask your doctor or other care provider to review them with you.

## 2018-10-22 NOTE — TELEPHONE ENCOUNTER
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 Order placed for humidified oxygen and pulse oxygen tank for travel. If symptoms improved continue pulsed oxygen tank with activity as she is more likely to use this given ease of transport. If no improvement in symptoms she will return this.   Kiesha Elias  10/22/2018

## 2018-10-22 NOTE — NURSING NOTE
Diet: Patient instructed regarding a heart healthy diet, including discussion of reduced fat and sodium intake. Patient demonstrated understanding of this information and agreed to call with further questions or concerns.  Labs: Patient was given results of the laboratory testing obtained today.  Patient demonstrated understanding of this information and agreed to call with further questions or concerns.   Return Appointment: Patient given instructions regarding scheduling next clinic visit. Patient demonstrated understanding of this information and agreed to call with further questions or concerns.  Patient stated she understood all health information given and agreed to call with further questions or concerns.   Beatriz Blanco RN

## 2018-10-22 NOTE — PATIENT INSTRUCTIONS
"You were seen today in the Cardiovascular Clinic at the [facility].     Cardiology Providers you saw during your visit: Kiesha MEDEL CNP      Follow up and medication changes:    1. No medication changes  2. Follow up with Dr Rosa as scheduled in 4 months with echo prior.     Results for JEAN KENNEDY (MRN [id number]) as of 10/22/2018 13:22   Ref. Range 10/22/2018 12:46   Sodium Latest Ref Range: 133 - 144 mmol/L 136   Potassium Latest Ref Range: 3.4 - 5.3 mmol/L 4.2   Chloride Latest Ref Range: 94 - 109 mmol/L 101   Carbon Dioxide Latest Ref Range: 20 - 32 mmol/L 27   Urea Nitrogen Latest Ref Range: 7 - 30 mg/dL 22   Creatinine Latest Ref Range: 0.52 - 1.04 mg/dL 0.97   GFR Estimate Latest Ref Range: >60 mL/min/1.7m2 55 (L)   GFR Estimate If Black Latest Ref Range: >60 mL/min/1.7m2 67   Calcium Latest Ref Range: 8.5 - 10.1 mg/dL 9.1   Anion Gap Latest Ref Range: 3 - 14 mmol/L 7   Glucose Latest Ref Range: 70 - 99 mg/dL 180 (H)           Please limit your fluid intake to 2 L (68 ounces) daily.  2 Liters a day = 8.5 cups, or 68 ounces.  Please limit your salt intake to 2 grams a day or less.     If you gain 2# in 24 hours or 5# in one week call Beatriz Blanco RN so we can adjust your medications as needed over the phone.     Please feel free to call me with any questions or concerns.       Beatriz Blanco RN CHFN  [facility]  Cardiology Care Coordinator-Heart Failure Clinic     Questions and [phone].   First press #1 for the [employer] and then press #3 for \"Medical Questions\" to reach us Cardiology Nurses.      On Call Cardiologist for after hours or on weekends: [phone]   option #4 and ask to speak to the on-call Cardiologist. Inform them you are a CORE/heart failure patient at the [place].           If you need a medication refill please contact your pharmacy.  Please allow 3 business days for your refill to be " completed.  _______________________________________________________  C.O.R.E. CLINIC Cardiomyopathy, Optimization, Rehabilitation, Education   The C.O.R.E. CLINIC is a heart failure specialty clinic within the [facility] where you will work with specialized nurse practitioners dedicated to helping patients with heart failure carefully adjust medications, receive education, and learn who and when to call if symptoms develop. They specialize in helping you better understand your condition, slow the progression of your disease, improve the length and quality of your life, help you detect future heart problems before they become life threatening, and avoid hospitalizations.  As always, thank you for trusting us with your health care needs!

## 2018-10-22 NOTE — PROGRESS NOTES
HPI:   Ms. 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  She is using her oxygen for long distances at [facility]. She [facility] for [facility] follow up.     She notes there was a period of time that she was not taking her Torsemide for a week and then had forgotten the afternoon dose for a few days thereafter. She complains of PND, orthopnea, and KIMBLE. She noted palpitations while off her diuretics last week, but this has since resolved. She denies fever, chills, lightheadedness, dizziness, chest pain, SOB, orthopnea, nausea, vomiting, diarrhea, hematochezia, melena, or LE edema. She notes improvement in respiratory symptoms since resumption of her Torsemide. His weight has been fluctuating at home with the cessation of her diuretics. He continues to follow a low sodium diet.        PAST MEDICAL HISTORY:  Past Medical History:   Diagnosis Date     Alcohol abuse, in remission      Allergic rhinitis, cause unspecified     allegra helps when she takes it     Antiplatelet or antithrombotic long-term use      Atrial fibrillation (H)     in hosp in 11/11 after surgery w/ fluid overload     Cardiomegaly     LVH on stress echo- cardiac w/u at [facility] ER- neg CT scan for PE, neg stress echo in 8/06     Chest pain, unspecified      Disorder of bone and cartilage, unspecified     osteopenia (had been on prempro), improved on 6/06 dexa, stable dexa 11/10     Diverticulosis of colon (without mention of hemorrhage)     last episode yrs ago     Essential hypertension, benign      Gastro-oesophageal reflux disease      Insomnia, unspecified     weaned off clonazepam     Irregular heart beat      Lumbago 7/09    MRI with NEAL, now seeing Dr. Cain for sciatic sx's     Major  depressive disorder, recurrent episode, moderate (H)      Obstructive sleep apnea      Osteoarthrosis, unspecified whether generalized or localized, unspecified site      Sjogren's syndrome (H)      Sleep apnea      Tobacco use disorder     chantix in 9/07, started again in 6/08, working       FAMILY HISTORY:  Family History   Problem Relation Age of Onset     C.A.D. Mother 63     MI- first at age 63     HEART DISEASE Mother      Hypertension Mother      Cerebrovascular Disease Mother      Hyperlipidemia Mother      Alcohol/Drug Father      Alzheimer Disease Father      Dementia Father      Hypertension Father      Hyperlipidemia Father      C.A.D. Sister 52     Minor MI- age 50's     HEART DISEASE Sister      Hypertension Sister      Hypertension Sister      Hypertension Brother      Cancer - colorectal Sister 48     Late 40's early 50's     Prostate Cancer Brother 74     Dx'd age 74     GASTROINTESTINAL DISEASE Sister      Diverticulitis     GASTROINTESTINAL DISEASE Brother      Diverticulitis     Lipids Sister      Lipids Sister      Parkinsonism Brother      Diabetes Sister      HEART DISEASE Sister      CHF     Cancer Sister      lung, smoker     Substance Abuse Sister      Substance Abuse Brother      Asthma Sister      Cancer Sister      Breast Cancer Daughter      Prostate Cancer Brother      Hyperlipidemia Brother        SOCIAL HISTORY:  Social History     Social History     Marital status: Single     Spouse name: N/A     Number of children: 0     Years of education: Ed Spec De     Occupational History     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     Social History Main Topics     Smoking status: Former Smoker     Packs/day: 0.50     Years: 10.00     Types: Cigarettes     Quit date: 8/1/2011     Smokeless tobacco: Never Used      Comment: 1/2 ppd     Alcohol use No      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     Drug use: No     Sexual activity: No     Other Topics  Concern     Not on file     Social History Narrative                       CURRENT MEDICATIONS:  Outpatient Medications Prior to Visit   Medication Sig Dispense Refill     ACCU-CHEK SHANNON PLUS test strip USE TO TEST BLOOD SUGARS 4 TIMES DAILY OR AS DIRECTED 400 strip 0     acetaminophen (TYLENOL) 500 MG tablet Take 500 mg by mouth nightly as needed        acyclovir (ZOVIRAX) 400 MG tablet Take 1 tablet (400 mg) by mouth 3 times daily For a couple days 15 tablet 2     acyclovir (ZOVIRAX) 5 % ointment Apply topically 6 times daily (Patient taking differently: Apply topically 6 times daily As needed for outbreaks) 15 g 3     albuterol (PROAIR HFA, PROVENTIL HFA, VENTOLIN HFA) 108 (90 BASE) MCG/ACT inhaler Inhale 2 puffs into the lungs every 6 hours 1 Inhaler 3     atorvastatin (LIPITOR) 10 MG tablet Take 1 tablet (10 mg) by mouth daily 90 tablet 0     BASAGLAR 100 UNIT/ML injection Inject 25 Units Subcutaneous daily (to replace lantus) 15 mL 1     BD JANE U/F 32G X 4 MM insulin pen needle USE 4 DAILY AS DIRECTED. 400 each 0     blood glucose monitoring (ACCU-CHEK SHANNON PLUS) test strip Use to test blood sugar 4 times daily or as directed.  Ok to substitute alternative if insurance prefers. 120 strip 11     blood glucose monitoring (ACCU-CHEK FASTCLIX) lancets Use to test blood sugar 4 times daily or as directed.  Ok to substitute alternative if insurance prefers. 1 Box 11     blood glucose monitoring (ACCU-CHEK MULTICLIX) lancets Use to test blood sugar 4 times daily or as directed. 4 Box 3     COMPRESSION STOCKINGS Wear compression stockings in affected leg (right leg) or both legs most of the time during the day and take them off at night. 2 each 2     escitalopram (LEXAPRO) 20 MG tablet TAKE 1 TABLET (20 MG) BY MOUTH DAILY 90 tablet 1     fluticasone (FLONASE) 50 MCG/ACT nasal spray Spray 1-2 sprays into both nostrils daily (Patient taking differently: Spray 1-2 sprays into both nostrils daily as needed for  allergies ) 16 g 5     insulin pen needle (B-D U/F) 31G X 8 MM Use 4 times daily (with Lantus and Novolog) or as directed. 400 each 3     ketoconazole (NIZORAL) 2 % cream Apply topically 2 times daily 60 g 1     lisinopril (PRINIVIL/ZESTRIL) 2.5 MG tablet TAKE 1 TABLET BY MOUTH EVERY DAY 90 tablet 0     metFORMIN (GLUCOPHAGE-XR) 500 MG 24 hr tablet TAKE 2 TABLETS BY MOUTH DAILY WITH DINNER 180 tablet 0     metoprolol succinate (TOPROL XL) 25 MG 24 hr tablet Take 0.5 tablets (12.5 mg) by mouth 2 times daily Take 50 mg by mouth in combination with 12.5 for a total of 62.5 twice a day 30 tablet 3     metoprolol succinate (TOPROL-XL) 100 MG 24 hr tablet Take 50 mg by mouth in combination with 12.5 for a total of 62.5 twice a day 90 tablet 3     montelukast (SINGULAIR) 10 MG tablet TAKE 1 TABLET BY MOUTH EVERYDAY AT BEDTIME 90 tablet 0     NOVOLOG FLEXPEN 100 UNIT/ML soln INJECT 12 UNITS SUBCUTANEOUS 3 TIMES DAILY (WITH MEALS) 15 mL 1     order for DME Equipment being ordered: Full face mask for CPAP - size: F10 large 1 each 0     order for DME Oxygen: Patient requires supplemental Oxygen 4 LPM via nasal canula with activity. Please provide patient with a home unit and with portability capability. Oxygen will be for a lifetime. 1 Device 0     potassium chloride SA (K-DUR/KLOR-CON M) 20 MEQ CR tablet Take 2 tabs (40 meq) in am and 1 tab (20 meq) in pm daily 270 tablet 3     predniSONE (DELTASONE) 1 MG tablet Use with 5 mg tabs to taper: 10 mg and 9 mg every other day for 1m, 9 mg/day for 1m, 9 mg and 8 mg every other day for 1m, etc. until at 7mg 120 tablet 6     predniSONE (DELTASONE) 10 MG tablet Take 2 tablets for three days (4/12-14), then take 1 tablet daily (10 mg daily starting 4/15) 90 tablet 3     predniSONE (DELTASONE) 5 MG tablet Use with 1 mg tabs to taper: 10 mg and 9 mg every other day for 1m, 9 mg/day for 1m, 9 mg and 8 mg every other day for 1m, etc. until at 7mg 30 tablet 6     spironolactone (ALDACTONE)  25 MG tablet Take 2 tablets (50 mg) by mouth daily 180 tablet 3     tiZANidine (ZANAFLEX) 2 MG tablet Take 1-2 tablets (2-4 mg) by mouth 3 times daily 90 tablet 1     torsemide (DEMADEX) 20 MG tablet Take 40 mg in the morning, 30 mg in the afternoon. 105 tablet 3     traZODone (DESYREL) 50 MG tablet Take 0.5-1 tablets (25-50 mg) by mouth nightly as needed for sleep 30 tablet 5     traZODone (DESYREL) 50 MG tablet TAKE 1/2-1 TABLET BY MOUTH NIGHTLY AS NEEDED, CAN TITRATE DOWN TO 1/2TAB OR UP TO 2TABS AS NEEDED 180 tablet 0     warfarin (COUMADIN) 7.5 MG tablet TAKE 1 TABLET BY MOUTH DAILY. CURRENT DOSE IS 7.5 MG DAILY. DOSE ADJUSTED PER INR RESULT. 90 tablet 3     alendronate (FOSAMAX) 70 MG tablet Take 1 tablet (70 mg) by mouth every 7 days (Patient not taking: Reported on 10/22/2018) 4 tablet 11     fluticasone (FLOVENT HFA) 220 MCG/ACT inhaler Inhale 2 puffs into the lungs 2 times daily (Patient not taking: Reported on 10/22/2018) 3 Inhaler 3     gabapentin (NEURONTIN) 300 MG capsule Take 1 capsule (300 mg) by mouth At Bedtime (Patient not taking: Reported on 10/22/2018) 30 capsule 0     lidocaine (LIDODERM) 5 % Patch Apply patch to painful area for up to 12 h within a 24 h period.  Remove after 12 hours. (Patient not taking: Reported on 10/22/2018) 30 patch 0     No facility-administered medications prior to visit.        ROS: Symptoms today:  CONSTITUTIONAL: Denies fever, chills, fatigue, or weight fluctuations.   HEENT: Denies headache, vision changes, and changes in speech.   CV: Refer to HPI.   PULMONARY:Denies shortness of breath, cough, or previous TB exposure.   GI:Denies nausea, vomiting, diarrhea, and abdominal pain. Bowel movements are regular.   :Denies urinary alterations, dysuria, urinary frequency, hematuria, and abnormal drainage.   EXT:Denies lower extremity edema.   SKIN:Denies abnormal rashes or lesions.   MUSCULOSKELETAL:Denies upper or lower extremity weakness and pain.   NEUROLOGIC:Denies  "lightheadedness, dizziness, seizures, or upper or lower extremity paresthesia.     EXAM:  /80 (BP Location: Right arm, Patient Position: Chair, Cuff Size: Adult Large)  Pulse 73  Ht 1.702 m (5' 7\")  Wt 96 kg (211 lb 9.6 oz)  SpO2 93%  BMI 33.14 kg/m2  GENERAL: Appears alert and oriented times three.   HEENT: Eye symmetrical and free of discharge bilaterally. Mucous membranes moist and without lesions.  NECK: Supple and without lymphadenopathy. JVD 8 cm.   CV: Irregular, controlled. S1S2 present without murmur, rub, or gallop.   RESPIRATORY: Respirations regular, even, and unlabored. Lungs CTA throughout.   GI: Soft and mildly distended with normoactive bowel sounds present in all quadrants. No tenderness, rebound, guarding. No organomegaly.   EXTREMITIES: No peripheral edema. 2+ bilateral pedal pulses.   NEUROLOGIC: Alert and orientated x 3. CN II-XII grossly intact. No focal deficits.   MUSCULOSKELETAL: No joint swelling or tenderness.   SKIN: No jaundice. No rashes or lesions.     Labs:  CBC RESULTS:  Lab Results   Component Value Date    WBC 15.6 (H) 11/08/2017    RBC 4.59 11/08/2017    HGB 13.4 11/08/2017    HCT 42.1 11/08/2017    MCV 92 11/08/2017    MCH 29.2 11/08/2017    MCHC 31.8 11/08/2017    RDW 16.7 (H) 11/08/2017     11/08/2017       CMP RESULTS:  Lab Results   Component Value Date     10/22/2018    POTASSIUM 4.2 10/22/2018    CHLORIDE 101 10/22/2018    CO2 27 10/22/2018    ANIONGAP 7 10/22/2018     (H) 10/22/2018    BUN 22 10/22/2018    CR 0.97 10/22/2018    GFRESTIMATED 55 (L) 10/22/2018    GFRESTBLACK 67 10/22/2018    CLAUDY 9.1 10/22/2018    BILITOTAL 0.7 05/23/2018    ALBUMIN 3.3 (L) 05/23/2018    ALKPHOS 71 05/23/2018    ALT 21 05/23/2018    AST 15 05/23/2018        INR RESULTS:  Lab Results   Component Value Date    INR 3.4 (A) 10/02/2018    INR 2.72 (H) 07/12/2018       Lab Results   Component Value Date    MAG 2.0 04/11/2017     Lab Results   Component Value Date    " NTBNPI 3531 (H) 04/06/2017     Lab Results   Component Value Date    NTBNP 804 (H) 04/11/2018       Diagnostics:  Echo 10/17/17:  Interpretation Summary  Technically difficult study. Poor acoustic windows.  Global and regional left ventricular function is normal with an EF of 60-65%.  Mild right ventricular dilation is present. Global right ventricular function  is normal.  Pulmonary artery systolic pressure is normal.  The inferior vena cava is normal. Estimated mean right atrial pressure is 3  mmHg.  No pericardial effusion is present.    Assessment and Plan:   Ms. 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 She [facility] for routine follow up and appears euvolemic today.     Heart failure with preserved ejection fraction.   ACC Functional Class III  Rate control: HR-73. Toprol XL 62.5 mg BID.  volume status: Euvolemic. Educated on need to take diuretics BID as prescribed.   Blood pressure control: BP stable. Lisinopril and Toprol XL.   Aldosterone antagonist: Aldactone 50 mg po daily   Evaluation of coronary arteries: Lexiscan negative for ischemia 6/14  Sleep apnea screening: ANGELICA on CPAP  - Repeat Echo prior to next appointment with Dr. Rosa.   - HD flu vaccine given today.       HTN.   - BP controlled on current regimen.       Afib. Holter notes persistent afib 100% of the time with rates up to 140's at rest. CHADSVASC-5.    - Toprol XL to 62.5 mg po BID.   - Coumadin per Coumadin clinic.   - Appreciate EP input.       ILD and Follicular Bronchiolitis  - Management per Pulmonary.   - Recommended oxygen use with activity, pt could consider attempting pulse dose oxygen tank for ease of travel. If notes improvement in KIMBLE continue.   - Repeat  PFT's per pulmonary when follow up 11/1/18.      GERD with PUD. History of GI bleed secondary to diverticular bleed 6/28/16 resolved with oral Vitamin K.    - Protonix to 40 mg po BID.       Sjogren's Disease.   - management per Rheumatology.      Follow up as scheduled with Dr. Rosa with echo prior in 4 months.       Kiesha Meehan  10/22/2018          CC  KIESHA MEEHAN

## 2018-10-22 NOTE — LETTER
10/22/2018      RE: Betty Tee  [address]  [facility] [address]       Dear Colleague,    Thank you for the opportunity to participate in the care of your patient, Betty Tee, at the [facility]. Please see a copy of my visit note below.    HPI:   Ms. 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  She is using her oxygen for long distances at [employer]. She presents to [facility] for routine follow up.     She notes there was a period of time that she was not taking her Torsemide for a week and then had forgotten the afternoon dose for a few days thereafter. She complains of PND, orthopnea, and KIMBLE. She noted palpitations while off her diuretics last week, but this has since resolved. She denies fever, chills, lightheadedness, dizziness, chest pain, SOB, orthopnea, nausea, vomiting, diarrhea, hematochezia, melena, or LE edema. She notes improvement in respiratory symptoms since resumption of her Torsemide. His weight has been fluctuating at home with the cessation of her diuretics. He continues to follow a low sodium diet.        PAST MEDICAL HISTORY:  Past Medical History:   Diagnosis Date     Alcohol abuse, in remission      Allergic rhinitis, cause unspecified     allegra helps when she takes it     Antiplatelet or antithrombotic long-term use      Atrial fibrillation (H)     in hosp in 11/11 after surgery w/ fluid overload     Cardiomegaly     LVH on stress echo- cardiac w/u at [facility] ER- neg CT scan for PE, neg stress echo in 8/06     Chest pain, unspecified      Disorder of bone and cartilage, unspecified     osteopenia (had been on prempro), improved on 6/06 dexa, stable dexa 11/10      Diverticulosis of colon (without mention of hemorrhage)     last episode yrs ago     Essential hypertension, benign      Gastro-oesophageal reflux disease      Insomnia, unspecified     weaned off clonazepam     Irregular heart beat      Lumbago 7/09    MRI with NEAL, now seeing Dr. Cain for sciatic sx's     Major depressive disorder, recurrent episode, moderate (H)      Obstructive sleep apnea      Osteoarthrosis, unspecified whether generalized or localized, unspecified site      Sjogren's syndrome (H)      Sleep apnea      Tobacco use disorder     chantix in 9/07, started again in 6/08, working       FAMILY HISTORY:  Family History   Problem Relation Age of Onset     C.A.D. Mother 63     MI- first at age 63     HEART DISEASE Mother      Hypertension Mother      Cerebrovascular Disease Mother      Hyperlipidemia Mother      Alcohol/Drug Father      Alzheimer Disease Father      Dementia Father      Hypertension Father      Hyperlipidemia Father      C.A.D. Sister 52     Minor MI- age 50's     HEART DISEASE Sister      Hypertension Sister      Hypertension Sister      Hypertension Brother      Cancer - colorectal Sister 48     Late 40's early 50's     Prostate Cancer Brother 74     Dx'd age 74     GASTROINTESTINAL DISEASE Sister      Diverticulitis     GASTROINTESTINAL DISEASE Brother      Diverticulitis     Lipids Sister      Lipids Sister      Parkinsonism Brother      Diabetes Sister      HEART DISEASE Sister      CHF     Cancer Sister      lung, smoker     Substance Abuse Sister      Substance Abuse Brother      Asthma Sister      Cancer Sister      Breast Cancer Daughter      Prostate Cancer Brother      Hyperlipidemia Brother        SOCIAL HISTORY:  Social History     Social History     Marital status: Single     Spouse name: N/A     Number of children: 0     Years of education: Ed Spec De     Occupational History     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      Social History Main Topics     Smoking status: Former Smoker     Packs/day: 0.50     Years: 10.00     Types: Cigarettes     Quit date: 8/1/2011     Smokeless tobacco: Never Used      Comment: 1/2 ppd     Alcohol use No      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     Drug use: No     Sexual activity: No     Other Topics Concern     Not on file     Social History Narrative                       CURRENT MEDICATIONS:  Outpatient Medications Prior to Visit   Medication Sig Dispense Refill     ACCU-CHEK SHANNON PLUS test strip USE TO TEST BLOOD SUGARS 4 TIMES DAILY OR AS DIRECTED 400 strip 0     acetaminophen (TYLENOL) 500 MG tablet Take 500 mg by mouth nightly as needed        acyclovir (ZOVIRAX) 400 MG tablet Take 1 tablet (400 mg) by mouth 3 times daily For a couple days 15 tablet 2     acyclovir (ZOVIRAX) 5 % ointment Apply topically 6 times daily (Patient taking differently: Apply topically 6 times daily As needed for outbreaks) 15 g 3     albuterol (PROAIR HFA, PROVENTIL HFA, VENTOLIN HFA) 108 (90 BASE) MCG/ACT inhaler Inhale 2 puffs into the lungs every 6 hours 1 Inhaler 3     atorvastatin (LIPITOR) 10 MG tablet Take 1 tablet (10 mg) by mouth daily 90 tablet 0     BASAGLAR 100 UNIT/ML injection Inject 25 Units Subcutaneous daily (to replace lantus) 15 mL 1     BD JANE U/F 32G X 4 MM insulin pen needle USE 4 DAILY AS DIRECTED. 400 each 0     blood glucose monitoring (ACCU-CHEK SHANNON PLUS) test strip Use to test blood sugar 4 times daily or as directed.  Ok to substitute alternative if insurance prefers. 120 strip 11     blood glucose monitoring (ACCU-CHEK FASTCLIX) lancets Use to test blood sugar 4 times daily or as directed.  Ok to substitute alternative if insurance prefers. 1 Box 11     blood glucose monitoring (ACCU-CHEK MULTICLIX) lancets Use to test blood sugar 4 times daily or as directed. 4 Box 3     COMPRESSION STOCKINGS Wear compression stockings in affected leg (right leg) or both legs most of the time  during the day and take them off at night. 2 each 2     escitalopram (LEXAPRO) 20 MG tablet TAKE 1 TABLET (20 MG) BY MOUTH DAILY 90 tablet 1     fluticasone (FLONASE) 50 MCG/ACT nasal spray Spray 1-2 sprays into both nostrils daily (Patient taking differently: Spray 1-2 sprays into both nostrils daily as needed for allergies ) 16 g 5     insulin pen needle (B-D U/F) 31G X 8 MM Use 4 times daily (with Lantus and Novolog) or as directed. 400 each 3     ketoconazole (NIZORAL) 2 % cream Apply topically 2 times daily 60 g 1     lisinopril (PRINIVIL/ZESTRIL) 2.5 MG tablet TAKE 1 TABLET BY MOUTH EVERY DAY 90 tablet 0     metFORMIN (GLUCOPHAGE-XR) 500 MG 24 hr tablet TAKE 2 TABLETS BY MOUTH DAILY WITH DINNER 180 tablet 0     metoprolol succinate (TOPROL XL) 25 MG 24 hr tablet Take 0.5 tablets (12.5 mg) by mouth 2 times daily Take 50 mg by mouth in combination with 12.5 for a total of 62.5 twice a day 30 tablet 3     metoprolol succinate (TOPROL-XL) 100 MG 24 hr tablet Take 50 mg by mouth in combination with 12.5 for a total of 62.5 twice a day 90 tablet 3     montelukast (SINGULAIR) 10 MG tablet TAKE 1 TABLET BY MOUTH EVERYDAY AT BEDTIME 90 tablet 0     NOVOLOG FLEXPEN 100 UNIT/ML soln INJECT 12 UNITS SUBCUTANEOUS 3 TIMES DAILY (WITH MEALS) 15 mL 1     order for DME Equipment being ordered: Full face mask for CPAP - size: F10 large 1 each 0     order for DME Oxygen: Patient requires supplemental Oxygen 4 LPM via nasal canula with activity. Please provide patient with a home unit and with portability capability. Oxygen will be for a lifetime. 1 Device 0     potassium chloride SA (K-DUR/KLOR-CON M) 20 MEQ CR tablet Take 2 tabs (40 meq) in am and 1 tab (20 meq) in pm daily 270 tablet 3     predniSONE (DELTASONE) 1 MG tablet Use with 5 mg tabs to taper: 10 mg and 9 mg every other day for 1m, 9 mg/day for 1m, 9 mg and 8 mg every other day for 1m, etc. until at 7mg 120 tablet 6     predniSONE (DELTASONE) 10 MG tablet Take 2  tablets for three days (4/12-14), then take 1 tablet daily (10 mg daily starting 4/15) 90 tablet 3     predniSONE (DELTASONE) 5 MG tablet Use with 1 mg tabs to taper: 10 mg and 9 mg every other day for 1m, 9 mg/day for 1m, 9 mg and 8 mg every other day for 1m, etc. until at 7mg 30 tablet 6     spironolactone (ALDACTONE) 25 MG tablet Take 2 tablets (50 mg) by mouth daily 180 tablet 3     tiZANidine (ZANAFLEX) 2 MG tablet Take 1-2 tablets (2-4 mg) by mouth 3 times daily 90 tablet 1     torsemide (DEMADEX) 20 MG tablet Take 40 mg in the morning, 30 mg in the afternoon. 105 tablet 3     traZODone (DESYREL) 50 MG tablet Take 0.5-1 tablets (25-50 mg) by mouth nightly as needed for sleep 30 tablet 5     traZODone (DESYREL) 50 MG tablet TAKE 1/2-1 TABLET BY MOUTH NIGHTLY AS NEEDED, CAN TITRATE DOWN TO 1/2TAB OR UP TO 2TABS AS NEEDED 180 tablet 0     warfarin (COUMADIN) 7.5 MG tablet TAKE 1 TABLET BY MOUTH DAILY. CURRENT DOSE IS 7.5 MG DAILY. DOSE ADJUSTED PER INR RESULT. 90 tablet 3     alendronate (FOSAMAX) 70 MG tablet Take 1 tablet (70 mg) by mouth every 7 days (Patient not taking: Reported on 10/22/2018) 4 tablet 11     fluticasone (FLOVENT HFA) 220 MCG/ACT inhaler Inhale 2 puffs into the lungs 2 times daily (Patient not taking: Reported on 10/22/2018) 3 Inhaler 3     gabapentin (NEURONTIN) 300 MG capsule Take 1 capsule (300 mg) by mouth At Bedtime (Patient not taking: Reported on 10/22/2018) 30 capsule 0     lidocaine (LIDODERM) 5 % Patch Apply patch to painful area for up to 12 h within a 24 h period.  Remove after 12 hours. (Patient not taking: Reported on 10/22/2018) 30 patch 0     No facility-administered medications prior to visit.        ROS: Symptoms today:  CONSTITUTIONAL: Denies fever, chills, fatigue, or weight fluctuations.   HEENT: Denies headache, vision changes, and changes in speech.   CV: Refer to HPI.   PULMONARY:Denies shortness of breath, cough, or previous TB exposure.   GI:Denies nausea, vomiting,  "diarrhea, and abdominal pain. Bowel movements are regular.   :Denies urinary alterations, dysuria, urinary frequency, hematuria, and abnormal drainage.   EXT:Denies lower extremity edema.   SKIN:Denies abnormal rashes or lesions.   MUSCULOSKELETAL:Denies upper or lower extremity weakness and pain.   NEUROLOGIC:Denies lightheadedness, dizziness, seizures, or upper or lower extremity paresthesia.     EXAM:  /80 (BP Location: Right arm, Patient Position: Chair, Cuff Size: Adult Large)  Pulse 73  Ht 1.702 m (5' 7\")  Wt 96 kg (211 lb 9.6 oz)  SpO2 93%  BMI 33.14 kg/m2  GENERAL: Appears alert and oriented times three.   HEENT: Eye symmetrical and free of discharge bilaterally. Mucous membranes moist and without lesions.  NECK: Supple and without lymphadenopathy. JVD 8 cm.   CV: Irregular, controlled. S1S2 present without murmur, rub, or gallop.   RESPIRATORY: Respirations regular, even, and unlabored. Lungs CTA throughout.   GI: Soft and mildly distended with normoactive bowel sounds present in all quadrants. No tenderness, rebound, guarding. No organomegaly.   EXTREMITIES: No peripheral edema. 2+ bilateral pedal pulses.   NEUROLOGIC: Alert and orientated x 3. CN II-XII grossly intact. No focal deficits.   MUSCULOSKELETAL: No joint swelling or tenderness.   SKIN: No jaundice. No rashes or lesions.     Labs:  CBC RESULTS:  Lab Results   Component Value Date    WBC 15.6 (H) 11/08/2017    RBC 4.59 11/08/2017    HGB 13.4 11/08/2017    HCT 42.1 11/08/2017    MCV 92 11/08/2017    MCH 29.2 11/08/2017    MCHC 31.8 11/08/2017    RDW 16.7 (H) 11/08/2017     11/08/2017       CMP RESULTS:  Lab Results   Component Value Date     10/22/2018    POTASSIUM 4.2 10/22/2018    CHLORIDE 101 10/22/2018    CO2 27 10/22/2018    ANIONGAP 7 10/22/2018     (H) 10/22/2018    BUN 22 10/22/2018    CR 0.97 10/22/2018    GFRESTIMATED 55 (L) 10/22/2018    GFRESTBLACK 67 10/22/2018    CLAUDY 9.1 10/22/2018    BILITOTAL 0.7 " 05/23/2018    ALBUMIN 3.3 (L) 05/23/2018    ALKPHOS 71 05/23/2018    ALT 21 05/23/2018    AST 15 05/23/2018        INR RESULTS:  Lab Results   Component Value Date    INR 3.4 (A) 10/02/2018    INR 2.72 (H) 07/12/2018       Lab Results   Component Value Date    MAG 2.0 04/11/2017     Lab Results   Component Value Date    NTBNPI 3531 (H) 04/06/2017     Lab Results   Component Value Date    NTBNP 804 (H) 04/11/2018       Diagnostics:  Echo 10/17/17:  Interpretation Summary  Technically difficult study. Poor acoustic windows.  Global and regional left ventricular function is normal with an EF of 60-65%.  Mild right ventricular dilation is present. Global right ventricular function  is normal.  Pulmonary artery systolic pressure is normal.  The inferior vena cava is normal. Estimated mean right atrial pressure is 3  mmHg.  No pericardial effusion is present.    Assessment and Plan:   Ms. 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 She presents to [facility] for routine follow up and appears euvolemic today.     Heart failure with preserved ejection fraction.   ACC Functional Class III  Rate control: HR-73. Toprol XL 62.5 mg BID.  volume status: Euvolemic. Educated on need to take diuretics BID as prescribed.   Blood pressure control: BP stable. Lisinopril and Toprol XL.   Aldosterone antagonist: Aldactone 50 mg po daily   Evaluation of coronary arteries: Lexiscan negative for ischemia 6/14  Sleep apnea screening: ANGELICA on CPAP  - Repeat Echo prior to next appointment with Dr. Rosa.   - HD flu vaccine given today.       HTN.   - BP controlled on current regimen.       Afib. Holter notes persistent afib 100% of the time with rates up to 140's at rest. CHADSVASC-5.    -  Toprol XL to 62.5 mg po BID.   - Coumadin per Coumadin clinic.   - Appreciate EP input.       ILD and Follicular Bronchiolitis  - Management per Pulmonary.   - Recommended oxygen use with activity, pt could consider attempting pulse dose oxygen tank for ease of travel. If notes improvement in KIMBLE continue.   - Repeat PFT's per pulmonary when follow up 11/1/18.      GERD with PUD. History of GI bleed secondary to diverticular bleed 6/28/16 resolved with oral Vitamin K.    - Protonix to 40 mg po BID.       Sjogren's Disease.   - management per Rheumatology.      Follow up as scheduled with Dr. Rosa with echo prior in 4 months.       Kiesha Elias  10/22/2018        CC  KIESHA ELIAS

## 2018-10-22 NOTE — NURSING NOTE
Chief Complaint   Patient presents with     Follow Up For     78 year old with diastolic heart failure presenting for follow up with labs prior     Vitals were taken and medications were reconciled.   Mary Alice SOTO  1:09 PM

## 2018-10-23 ENCOUNTER — MYC MEDICAL ADVICE (OUTPATIENT)
Dept: FAMILY MEDICINE | Facility: CLINIC | Age: 78
End: 2018-10-23
Payer: MEDICARE

## 2018-10-23 PROCEDURE — 99207 ZZC NO CHARGE NURSE ONLY: CPT | Performed by: FAMILY MEDICINE

## 2018-10-24 NOTE — TELEPHONE ENCOUNTER
ANTICOAGULATION FOLLOW-UP CLINIC VISIT    Patient Name:  Betty Tee  Date:  10/24/2018  Contact Type:  Mychart.  Lab done 10/22 at outside lab    SUBJECTIVE:  Bleeding Signs/Symptoms: None  Thromboembolic Signs/Symptoms: None    Medication Changes:  No  Dietary Changes:  No  Bacterial/Viral Infection:  No    Missed Coumadin Doses:  None  Other Concerns:  No      ASSESSMENT/PLAN:  See: ANTICOAGULATION QIC flow sheet.    INR 2.8 (done 10/22 at outside lab)  Plan: continue 7.5 mg daily = 52.5 mg/wk. Recheck INR in 4 weeks.  Sophia Hemphill RN      Dosage adjustment made based on physician directed care plan.    JIMI VOGT

## 2018-10-28 DIAGNOSIS — M35.02 SJOGREN'S SYNDROME WITH LUNG INVOLVEMENT (H): ICD-10-CM

## 2018-10-28 DIAGNOSIS — I10 ESSENTIAL HYPERTENSION WITH GOAL BLOOD PRESSURE LESS THAN 140/90: ICD-10-CM

## 2018-10-28 DIAGNOSIS — J84.9 ILD (INTERSTITIAL LUNG DISEASE) (H): ICD-10-CM

## 2018-10-29 RX ORDER — MONTELUKAST SODIUM 10 MG/1
TABLET ORAL
Start: 2018-10-29

## 2018-10-29 RX ORDER — LISINOPRIL 2.5 MG/1
TABLET ORAL
Start: 2018-10-29

## 2018-10-29 NOTE — TELEPHONE ENCOUNTER
"Montelukast:  Denied  Too early; Rx sent 10/19/2018 for 90 days    Lisinopril:  Denied  Too early; Rx sent 10/22/2018 for 9 months    Lydia HALEY RN    Requested Prescriptions   Pending Prescriptions Disp Refills     montelukast (SINGULAIR) 10 MG tablet [Pharmacy Med Name: MONTELUKAST SOD 10 MG TABLET] 90 tablet 0     Sig: TAKE 1 TABLET BY MOUTH EVERYDAY AT BEDTIME    Leukotriene Inhibitors Protocol Passed    10/28/2018  1:19 PM       Passed - Patient is age 12 or older    If patient is under 16, ok to refill using age based dosing.          Passed - Recent (12 mo) or future (30 days) visit within the authorizing provider's specialty    Patient had office visit in the last 12 months or has a visit in the next 30 days with authorizing provider or within the authorizing provider's specialty.  See \"Patient Info\" tab in inbasket, or \"Choose Columns\" in Meds & Orders section of the refill encounter.              lisinopril (PRINIVIL/ZESTRIL) 2.5 MG tablet [Pharmacy Med Name: LISINOPRIL 2.5 MG TABLET] 90 tablet 0     Sig: TAKE 1 TABLET BY MOUTH EVERY DAY    ACE Inhibitors (Including Combos) Protocol Passed    10/28/2018  1:19 PM       Passed - Blood pressure under 140/90 in past 12 months    BP Readings from Last 3 Encounters:   10/22/18 110/80   10/02/18 100/69   08/17/18 111/73                Passed - Recent (12 mo) or future (30 days) visit within the authorizing provider's specialty    Patient had office visit in the last 12 months or has a visit in the next 30 days with authorizing provider or within the authorizing provider's specialty.  See \"Patient Info\" tab in inbasket, or \"Choose Columns\" in Meds & Orders section of the refill encounter.             Passed - Patient is age 18 or older       Passed - No active pregnancy on record       Passed - Normal serum creatinine on file in past 12 months    Recent Labs   Lab Test  10/22/18   1246   04/02/17   2213   CR  0.97   < >   --    CREAT   --    --   0.6    < > = " values in this interval not displayed.            Passed - Normal serum potassium on file in past 12 months    Recent Labs   Lab Test  10/22/18   1246   POTASSIUM  4.2            Passed - No positive pregnancy test in past 12 months        Next 5 appointments (look out 90 days)     Nov 28, 2018 12:45 PM CST   Office Visit with Ilene Tristan MD   [facility] ([facility]    [address] [facility] [address] [phone]

## 2018-11-01 ENCOUNTER — OFFICE VISIT (OUTPATIENT)
Dept: PULMONOLOGY | Facility: CLINIC | Age: 78
End: 2018-11-01
Attending: INTERNAL MEDICINE
Payer: MEDICARE

## 2018-11-01 ENCOUNTER — PATIENT OUTREACH (OUTPATIENT)
Dept: PULMONOLOGY | Facility: CLINIC | Age: 78
End: 2018-11-01

## 2018-11-01 VITALS
RESPIRATION RATE: 17 BRPM | BODY MASS INDEX: 33.12 KG/M2 | WEIGHT: 211 LBS | HEART RATE: 73 BPM | HEIGHT: 67 IN | SYSTOLIC BLOOD PRESSURE: 109 MMHG | DIASTOLIC BLOOD PRESSURE: 73 MMHG | OXYGEN SATURATION: 96 %

## 2018-11-01 DIAGNOSIS — J84.9 ILD (INTERSTITIAL LUNG DISEASE) (H): Primary | ICD-10-CM

## 2018-11-01 DIAGNOSIS — J84.9 ILD (INTERSTITIAL LUNG DISEASE) (H): ICD-10-CM

## 2018-11-01 DIAGNOSIS — J44.89 FOLLICULAR BRONCHIOLITIS (H): ICD-10-CM

## 2018-11-01 PROCEDURE — G0463 HOSPITAL OUTPT CLINIC VISIT: HCPCS | Mod: ZF

## 2018-11-01 ASSESSMENT — PAIN SCALES - GENERAL: PAINLEVEL: NO PAIN (0)

## 2018-11-01 NOTE — LETTER
11/1/2018      RE: Betty Tee  [address]  [facility] [id number]       [facility]    Reason for Visit  Betty Tee is a 78 year old year old female who is being seen for RECHECK (F/U ILD)    HPI    Ms. Buckner is a 78-year-old who is here for follow-up of Sjogren's follicular bronchiolitis with mild ILD.  She had a follow up chest CT 3 months ago because of a drop in her PFT.  The drop in her PFT was likely related to a back injury that she sustained at that time.  Chest CT showed changes consistent with follicular bronchiolitis and no ILD.  There were areas of atelectasis.  She has been on prednisone for her follicular bronchiolitis for at least 2 years, and on prednisone prior to that for Sjogren's symptoms for many years.  Her current dose of prednisone is 7 mg alternating with 8 mg every other day.  She is on a slow prednisone taper per rheumatology.    Ms. Buckner reports dyspnea on exertion that is unchanged.  For example, she feels short of breath when she walks up one flight of stairs.  She does not exercise and has never done pulmonary rehab.  She denies paroxysmal nocturnal dyspnea dyspnea and does wear CPAP for sleep apnea.  She uses oxygen at home 4 L/min with activity which she has done for 3-4 years.  She does endorse morning cough that is not bothersome.  She denies wheezing.  She has not used her albuterol in a long time and uses her Flovent inconsistently.  She did receive the flu vaccine.        Current Outpatient Prescriptions   Medication     ACCU-CHEK SHANNON PLUS test strip     acetaminophen (TYLENOL) 500 MG tablet     acyclovir (ZOVIRAX) 400 MG tablet     acyclovir (ZOVIRAX) 5 % ointment     albuterol (PROAIR HFA, PROVENTIL HFA, VENTOLIN HFA) 108 (90 BASE) MCG/ACT inhaler     atorvastatin (LIPITOR) 10 MG tablet     BASAGLAR 100 UNIT/ML injection     BD JANE U/F 32G X 4 MM insulin pen needle     blood glucose monitoring  (ACCU-CHEK SHANNON PLUS) test strip     blood glucose monitoring (ACCU-CHEK FASTCLIX) lancets     blood glucose monitoring (ACCU-CHEK MULTICLIX) lancets     COMPRESSION STOCKINGS     escitalopram (LEXAPRO) 20 MG tablet     fluticasone (FLONASE) 50 MCG/ACT nasal spray     Humidifier MISC     insulin pen needle (B-D U/F) 31G X 8 MM     ketoconazole (NIZORAL) 2 % cream     lisinopril (PRINIVIL/ZESTRIL) 2.5 MG tablet     metFORMIN (GLUCOPHAGE-XR) 500 MG 24 hr tablet     metoprolol succinate (TOPROL XL) 25 MG 24 hr tablet     metoprolol succinate (TOPROL-XL) 100 MG 24 hr tablet     montelukast (SINGULAIR) 10 MG tablet     NOVOLOG FLEXPEN 100 UNIT/ML soln     order for DME     order for DME     order for DME     potassium chloride SA (K-DUR/KLOR-CON M) 20 MEQ CR tablet     predniSONE (DELTASONE) 1 MG tablet     predniSONE (DELTASONE) 10 MG tablet     predniSONE (DELTASONE) 5 MG tablet     spironolactone (ALDACTONE) 25 MG tablet     tiZANidine (ZANAFLEX) 2 MG tablet     torsemide (DEMADEX) 20 MG tablet     traZODone (DESYREL) 50 MG tablet     traZODone (DESYREL) 50 MG tablet     warfarin (COUMADIN) 7.5 MG tablet     alendronate (FOSAMAX) 70 MG tablet     fluticasone (FLOVENT HFA) 220 MCG/ACT inhaler     gabapentin (NEURONTIN) 300 MG capsule     lidocaine (LIDODERM) 5 % Patch     No current facility-administered medications for this visit.      Allergies   Allergen Reactions     Augmentin Nausea and Vomiting     Codeine Nausea and Vomiting     Phenobarbital Itching     Seasonal Allergies      Past Medical History:   Diagnosis Date     Alcohol abuse, in remission      Allergic rhinitis, cause unspecified     allegra helps when she takes it     Antiplatelet or antithrombotic long-term use      Atrial fibrillation (H)     in hosp in 11/11 after surgery w/ fluid overload     Cardiomegaly     LVH on stress echo- cardiac w/u at [facility] ER- neg CT scan for PE, neg stress echo in 8/06     Chest pain, unspecified      Disorder of bone  and cartilage, unspecified     osteopenia (had been on prempro), improved on 6/06 dexa, stable dexa 11/10     Diverticulosis of colon (without mention of hemorrhage)     last episode yrs ago     Essential hypertension, benign      Follicular bronchiolitis (H)     associated with Sjogrens, dx by chest CT showing mosaic attenuation and air trapping     Gastro-oesophageal reflux disease      ILD (interstitial lung disease) (H)     associated with Sjogrens, also has mildly elevated IgG4, first noted on chest CT 2015 (mild changes) and also has small airways disease     Insomnia, unspecified     weaned off clonazepam     Irregular heart beat      Lumbago 7/09    MRI with DJD, now seeing Dr. Cain for sciatic sx's     Major depressive disorder, recurrent episode, moderate (H)      Obstructive sleep apnea      Osteoarthrosis, unspecified whether generalized or localized, unspecified site      Sjogren's syndrome (H)     + RG and SSA and lip bx     Sleep apnea      Tobacco use disorder     chantix in 9/07, started again in 6/08, working       Past Surgical History:   Procedure Laterality Date     BACK SURGERY  1962     BIOPSY BREAST  9/27/02    Biopsy Left Breast     C APPENDECTOMY  1970's?     C NONSPECIFIC PROCEDURE  11/05    exploratory abd lap, adhesions, resolved RLQ pain, diverticulitis episodes     CARDIAC SURGERY       CHOLECYSTECTOMY  1990's?     COLECTOMY LEFT  11/7/2011    Procedure:COLECTOMY LEFT; Laparoscopic mobilization of splenic flexture, sigmoid colectomy, coloprotoscopy, loop illeostomy; Surgeon:CK CASTANEDA; [facility]     HYSTERECTOMY TOTAL ABDOMINAL, BILATERAL SALPINGO-OOPHORECTOMY, COMBINED  11/7/2011    Procedure:COMBINED HYSTERECTOMY TOTAL ABDOMINAL, BILATERAL SALPINGO-OOPHORECTOMY; total abdominal hysterectomy, bilateral salpingo-oophorectomy; Surgeon:ALETA MANUEL; Location[facility]     INSERT STENT URETER  11/7/2011    Procedure:INSERT STENT URETER; Placement of Bilateral Ureteral Stents ;  Surgeon:PRANEETH BRYANT; [facility]     SIGMOIDOSCOPY FLEXIBLE  11/3/2011    Procedure:SIGMOIDOSCOPY FLEXIBLE; Flexible Sigmoidoscopy; Surgeon:CK CASTANEDA; [facility]     TAKEDOWN ILEOSTOMY  2/1/2012    Procedure:TAKEDOWN ILEOSTOMY; Takedown Loop Ileostomy ; Surgeon:CK CASTANEDA; Location[facility]       Social History     Social History     Marital status: Single     Spouse name: N/A     Number of children: 0     Years of education: Ed Spec De     Occupational History     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 Mother 63     MI- first at age 63     HEART DISEASE Mother      Hypertension Mother      Cerebrovascular Disease Mother      Hyperlipidemia Mother      Alcohol/Drug Father      Alzheimer Disease Father      Dementia Father      Hypertension Father      Hyperlipidemia Father      C.A.D. Sister 52     Minor MI- age 50's     HEART DISEASE Sister      Hypertension Sister      Hypertension Sister      Hypertension Brother      Cancer - colorectal Sister 48     Late 40's early 50's     Prostate Cancer Brother 74     Dx'd age 74     GASTROINTESTINAL DISEASE Sister      Diverticulitis     GASTROINTESTINAL DISEASE Brother      Diverticulitis     Lipids Sister      Lipids Sister      Parkinsonism Brother      Diabetes Sister      HEART DISEASE Sister      CHF     Cancer Sister      lung, smoker     Substance Abuse Sister      Substance Abuse Brother      Asthma Sister      Cancer Sister      Breast Cancer  "Daughter      Prostate Cancer Brother      Hyperlipidemia Brother              ROS Pulmonary  A complete ROS was otherwise negative except as noted in the HPI.    Vitals: /73  Pulse 73  Resp 17  Ht 1.702 m (5' 7\")  Wt 95.7 kg (211 lb)  SpO2 96%  BMI 33.05 kg/m2    Exam:   GENERAL APPEARANCE: Well developed, well nourished, alert, obese, and in no apparent distress.  RESP: good air flow throughout.  Few bibasilar crackles. No rhonchi. No wheezes.  CV: Normal S1, S2, regular rhythm, normal rate. No murmur.  No LE edema.   MS: extremities normal. No clubbing. No cyanosis.  SKIN: no rash on limited exam.  NEURO: Mentation intact, speech normal, normal gait and stance.  PSYCH: mentation appears normal. and affect normal/bright.    Results:  Recent Results (from the past 168 hour(s))   General PFT Lab (Please always keep checked)    Collection Time: 11/01/18  8:08 AM   Result Value Ref Range    FVC-Pred 2.73 L    FVC-Pre 1.70 L    FVC-%Pred-Pre 62 %    FEV1-Pre 1.36 L    FEV1-%Pred-Pre 65 %    FEV1FVC-Pred 74 %    FEV1FVC-Pre 80 %    FEFMax-Pred 5.17 L/sec    FEFMax-Pre 3.99 L/sec    FEFMax-%Pred-Pre 77 %    FEF2575-Pred 1.65 L/sec    FEF2575-Pre 1.30 L/sec    JZI5267-%Pred-Pre 79 %    ExpTime-Pre 6.18 sec    FIFMax-Pre 2.97 L/sec    VC-Pred 3.07 L    VC-Pre 1.58 L    VC-%Pred-Pre 51 %    IC-Pred 2.80 L    IC-Pre 1.55 L    IC-%Pred-Pre 55 %    ERV-Pred 0.27 L    ERV-Pre 0.03 L    ERV-%Pred-Pre 11 %    FEV1FEV6-Pred 78 %    FEV1FEV6-Pre 80 %    FRCPleth-Pred 2.81 L    FRCPleth-Pre 2.40 L    FRCPleth-%Pred-Pre 85 %    RVPleth-Pred 2.27 L    RVPleth-Pre 2.36 L    RVPleth-%Pred-Pre 104 %    TLCPleth-Pred 5.19 L    TLCPleth-Pre 3.94 L    TLCPleth-%Pred-Pre 75 %    DLCOunc-Pred 20.19 ml/min/mmHg    DLCOunc-Pre 17.46 ml/min/mmHg    DLCOunc-%Pred-Pre 86 %    VA-Pre 2.80 L    VA-%Pred-Pre 52 %    FEV1SVC-Pred 67 %    FEV1SVC-Pre 86 %     I reviewed pulmonary function test that was performed today.  This shows moderate " restriction with a normal diffusion.  Spirometry has improved compared to 3 months ago back to prior baseline.  Her diffusion is stable.    I reviewed results with the patient.      Assessment and plan:   Ms. Buckner is a 78-year-old with Sjogren's follicular bronchiolitis and mild interstitial lung disease who is here for follow-up.  1.  Sjogren's follicular bronchiolitis.  She has been on prednisone for many years for her Sjogren's symptoms, and prednisone dose was increased approximately 2 years ago for the new diagnosis of follicular bronchiolitis.  She had initial improvement of her symptoms.  Her main symptom was dyspnea on exertion.  She is undergoing a slow prednisone taper per rheumatology and feels that her dyspnea is maybe a little bit worse.  However, PFT is back up to her prior baseline and stable.  She is not exercising.  I agree with trying to to taper her prednisone down to 7 mg a day and then continue at that dose until next follow-up.  I have recommended pulmonary rehab as I think some of her dyspnea is related to deconditioning and obesity.  I will have my nurse coordinator talk with her about setting that up.  She is not using her Flovent regularly, and I did encourage her to use it as scheduled twice a day.  She has received her flu vaccine.  I will see her back in 3 months with full PFT.  2.  Hypoxia.  She has been on oxygen at home for 3-4 years per her report.  She currently uses 4 L/min with activity.  I will have my nurse talk with her about portable oxygen devices as that would enable her to be more ambulatory and active.    3.  Sjogren's ILD.  Her most recent CT scan 3 months ago shows no ILD changes and some atelectasis.  This has improved compared to prior chest CT scan from 2016.              Maryjane Tillman MD

## 2018-11-01 NOTE — PROGRESS NOTES
[facility]    Reason for Visit  Betty Tee is a 78 year old year old female who is being seen for RECHECK (F/U ILD)    HPI    Ms. Buckner is a 78-year-old who is here for follow-up of Sjogren's follicular bronchiolitis with mild ILD.  She had a follow up chest CT 3 months ago because of a drop in her PFT.  The drop in her PFT was likely related to a back injury that she sustained at that time.  Chest CT showed changes consistent with follicular bronchiolitis and no ILD.  There were areas of atelectasis.  She has been on prednisone for her follicular bronchiolitis for at least 2 years, and on prednisone prior to that for Sjogren's symptoms for many years.  Her current dose of prednisone is 7 mg alternating with 8 mg every other day.  She is on a slow prednisone taper per rheumatology.    Ms. Buckner reports dyspnea on exertion that is unchanged.  For example, she feels short of breath when she walks up one flight of stairs.  She does not exercise and has never done pulmonary rehab.  She denies paroxysmal nocturnal dyspnea dyspnea and does wear CPAP for sleep apnea.  She uses oxygen at home 4 L/min with activity which she has done for 3-4 years.  She does endorse morning cough that is not bothersome.  She denies wheezing.  She has not used her albuterol in a long time and uses her Flovent inconsistently.  She did receive the flu vaccine.        Current Outpatient Prescriptions   Medication     ACCU-CHEK SHANNON PLUS test strip     acetaminophen (TYLENOL) 500 MG tablet     acyclovir (ZOVIRAX) 400 MG tablet     acyclovir (ZOVIRAX) 5 % ointment     albuterol (PROAIR HFA, PROVENTIL HFA, VENTOLIN HFA) 108 (90 BASE) MCG/ACT inhaler     atorvastatin (LIPITOR) 10 MG tablet     BASAGLAR 100 UNIT/ML injection     BD JANE U/F 32G X 4 MM insulin pen needle     blood glucose monitoring (ACCU-CHEK SHANNON PLUS) test strip     blood glucose monitoring (ACCU-CHEK FASTCLIX) lancets      blood glucose monitoring (ACCU-CHEK MULTICLIX) lancets     COMPRESSION STOCKINGS     escitalopram (LEXAPRO) 20 MG tablet     fluticasone (FLONASE) 50 MCG/ACT nasal spray     Humidifier MISC     insulin pen needle (B-D U/F) 31G X 8 MM     ketoconazole (NIZORAL) 2 % cream     lisinopril (PRINIVIL/ZESTRIL) 2.5 MG tablet     metFORMIN (GLUCOPHAGE-XR) 500 MG 24 hr tablet     metoprolol succinate (TOPROL XL) 25 MG 24 hr tablet     metoprolol succinate (TOPROL-XL) 100 MG 24 hr tablet     montelukast (SINGULAIR) 10 MG tablet     NOVOLOG FLEXPEN 100 UNIT/ML soln     order for DME     order for DME     order for DME     potassium chloride SA (K-DUR/KLOR-CON M) 20 MEQ CR tablet     predniSONE (DELTASONE) 1 MG tablet     predniSONE (DELTASONE) 10 MG tablet     predniSONE (DELTASONE) 5 MG tablet     spironolactone (ALDACTONE) 25 MG tablet     tiZANidine (ZANAFLEX) 2 MG tablet     torsemide (DEMADEX) 20 MG tablet     traZODone (DESYREL) 50 MG tablet     traZODone (DESYREL) 50 MG tablet     warfarin (COUMADIN) 7.5 MG tablet     alendronate (FOSAMAX) 70 MG tablet     fluticasone (FLOVENT HFA) 220 MCG/ACT inhaler     gabapentin (NEURONTIN) 300 MG capsule     lidocaine (LIDODERM) 5 % Patch     No current facility-administered medications for this visit.      Allergies   Allergen Reactions     Augmentin Nausea and Vomiting     Codeine Nausea and Vomiting     Phenobarbital Itching     Seasonal Allergies      Past Medical History:   Diagnosis Date     Alcohol abuse, in remission      Allergic rhinitis, cause unspecified     allegra helps when she takes it     Antiplatelet or antithrombotic long-term use      Atrial fibrillation (H)     in hosp in 11/11 after surgery w/ fluid overload     Cardiomegaly     LVH on stress echo- cardiac w/u at [facility] ER- neg CT scan for PE, neg stress echo in 8/06     Chest pain, unspecified      Disorder of bone and cartilage, unspecified     osteopenia (had been on prempro), improved on 6/06 dexa,  stable dexa 11/10     Diverticulosis of colon (without mention of hemorrhage)     last episode yrs ago     Essential hypertension, benign      Follicular bronchiolitis (H)     associated with Sjogrens, dx by chest CT showing mosaic attenuation and air trapping     Gastro-oesophageal reflux disease      ILD (interstitial lung disease) (H)     associated with Sjogrens, also has mildly elevated IgG4, first noted on chest CT 2015 (mild changes) and also has small airways disease     Insomnia, unspecified     weaned off clonazepam     Irregular heart beat      Lumbago 7/09    MRI with DJD, now seeing Dr. Cain for sciatic sx's     Major depressive disorder, recurrent episode, moderate (H)      Obstructive sleep apnea      Osteoarthrosis, unspecified whether generalized or localized, unspecified site      Sjogren's syndrome (H)     + RG and SSA and lip bx     Sleep apnea      Tobacco use disorder     chantix in 9/07, started again in 6/08, working       Past Surgical History:   Procedure Laterality Date     BACK SURGERY  1962     BIOPSY BREAST  9/27/02    Biopsy Left Breast     C APPENDECTOMY  1970's?     C NONSPECIFIC PROCEDURE  11/05    exploratory abd lap, adhesions, resolved RLQ pain, diverticulitis episodes     CARDIAC SURGERY       CHOLECYSTECTOMY  1990's?     COLECTOMY LEFT  11/7/2011    Procedure:COLECTOMY LEFT; Laparoscopic mobilization of splenic flexture, sigmoid colectomy, coloprotoscopy, loop illeostomy; Surgeon:CK CASTANEDA; [facility]     HYSTERECTOMY TOTAL ABDOMINAL, BILATERAL SALPINGO-OOPHORECTOMY, COMBINED  11/7/2011    Procedure:COMBINED HYSTERECTOMY TOTAL ABDOMINAL, BILATERAL SALPINGO-OOPHORECTOMY; total abdominal hysterectomy, bilateral salpingo-oophorectomy; Surgeon:ALETA MANUEL; [facility]     INSERT STENT URETER  11/7/2011    Procedure:INSERT STENT URETER; Placement of Bilateral Ureteral Stents ; Surgeon:PRANEETH BRYANT; [facility]     SIGMOIDOSCOPY FLEXIBLE  11/3/2011     Procedure:SIGMOIDOSCOPY FLEXIBLE; Flexible Sigmoidoscopy; Surgeon:CK CASTANEDA; [facility]     TAKEDOWN ILEOSTOMY  2/1/2012    Procedure:TAKEDOWN ILEOSTOMY; Takedown Loop Ileostomy ; Surgeon:CK CASTANEDA; [facility]       Social History     Social History     Marital status: Single     Spouse name: N/A     Number of children: 0     Years of education: Ed Spec De     Occupational History     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 Mother 63     MI- first at age 63     HEART DISEASE Mother      Hypertension Mother      Cerebrovascular Disease Mother      Hyperlipidemia Mother      Alcohol/Drug Father      Alzheimer Disease Father      Dementia Father      Hypertension Father      Hyperlipidemia Father      C.A.D. Sister 52     Minor MI- age 50's     HEART DISEASE Sister      Hypertension Sister      Hypertension Sister      Hypertension Brother      Cancer - colorectal Sister 48     Late 40's early 50's     Prostate Cancer Brother 74     Dx'd age 74     GASTROINTESTINAL DISEASE Sister      Diverticulitis     GASTROINTESTINAL DISEASE Brother      Diverticulitis     Lipids Sister      Lipids Sister      Parkinsonism Brother      Diabetes Sister      HEART DISEASE Sister      CHF     Cancer Sister      lung, smoker     Substance Abuse Sister      Substance Abuse Brother      Asthma Sister      Cancer Sister      Breast Cancer Daughter      Prostate Cancer Brother      Hyperlipidemia Brother              ROS  "Pulmonary  A complete ROS was otherwise negative except as noted in the HPI.    Vitals: /73  Pulse 73  Resp 17  Ht 1.702 m (5' 7\")  Wt 95.7 kg (211 lb)  SpO2 96%  BMI 33.05 kg/m2    Exam:   GENERAL APPEARANCE: Well developed, well nourished, alert, obese, and in no apparent distress.  RESP: good air flow throughout.  Few bibasilar crackles. No rhonchi. No wheezes.  CV: Normal S1, S2, regular rhythm, normal rate. No murmur.  No LE edema.   MS: extremities normal. No clubbing. No cyanosis.  SKIN: no rash on limited exam.  NEURO: Mentation intact, speech normal, normal gait and stance.  PSYCH: mentation appears normal. and affect normal/bright.    Results:  Recent Results (from the past 168 hour(s))   General PFT Lab (Please always keep checked)    Collection Time: 11/01/18  8:08 AM   Result Value Ref Range    FVC-Pred 2.73 L    FVC-Pre 1.70 L    FVC-%Pred-Pre 62 %    FEV1-Pre 1.36 L    FEV1-%Pred-Pre 65 %    FEV1FVC-Pred 74 %    FEV1FVC-Pre 80 %    FEFMax-Pred 5.17 L/sec    FEFMax-Pre 3.99 L/sec    FEFMax-%Pred-Pre 77 %    FEF2575-Pred 1.65 L/sec    FEF2575-Pre 1.30 L/sec    VQS3301-%Pred-Pre 79 %    ExpTime-Pre 6.18 sec    FIFMax-Pre 2.97 L/sec    VC-Pred 3.07 L    VC-Pre 1.58 L    VC-%Pred-Pre 51 %    IC-Pred 2.80 L    IC-Pre 1.55 L    IC-%Pred-Pre 55 %    ERV-Pred 0.27 L    ERV-Pre 0.03 L    ERV-%Pred-Pre 11 %    FEV1FEV6-Pred 78 %    FEV1FEV6-Pre 80 %    FRCPleth-Pred 2.81 L    FRCPleth-Pre 2.40 L    FRCPleth-%Pred-Pre 85 %    RVPleth-Pred 2.27 L    RVPleth-Pre 2.36 L    RVPleth-%Pred-Pre 104 %    TLCPleth-Pred 5.19 L    TLCPleth-Pre 3.94 L    TLCPleth-%Pred-Pre 75 %    DLCOunc-Pred 20.19 ml/min/mmHg    DLCOunc-Pre 17.46 ml/min/mmHg    DLCOunc-%Pred-Pre 86 %    VA-Pre 2.80 L    VA-%Pred-Pre 52 %    FEV1SVC-Pred 67 %    FEV1SVC-Pre 86 %     I reviewed pulmonary function test that was performed today.  This shows moderate restriction with a normal diffusion.  Spirometry has improved compared to 3 months " ago back to prior baseline.  Her diffusion is stable.    I reviewed results with the patient.      Assessment and plan:   Ms. Buckner is a 78-year-old with Sjogren's follicular bronchiolitis and mild interstitial lung disease who is here for follow-up.  1.  Sjogren's follicular bronchiolitis.  She has been on prednisone for many years for her Sjogren's symptoms, and prednisone dose was increased approximately 2 years ago for the new diagnosis of follicular bronchiolitis.  She had initial improvement of her symptoms.  Her main symptom was dyspnea on exertion.  She is undergoing a slow prednisone taper per rheumatology and feels that her dyspnea is maybe a little bit worse.  However, PFT is back up to her prior baseline and stable.  She is not exercising.  I agree with trying to to taper her prednisone down to 7 mg a day and then continue at that dose until next follow-up.  I have recommended pulmonary rehab as I think some of her dyspnea is related to deconditioning and obesity.  I will have my nurse coordinator talk with her about setting that up.  She is not using her Flovent regularly, and I did encourage her to use it as scheduled twice a day.  She has received her flu vaccine.  I will see her back in 3 months with full PFT.  2.  Hypoxia.  She has been on oxygen at home for 3-4 years per her report.  She currently uses 4 L/min with activity.  I will have my nurse talk with her about portable oxygen devices as that would enable her to be more ambulatory and active.    3.  Sjogren's ILD.  Her most recent CT scan 3 months ago shows no ILD changes and some atelectasis.  This has improved compared to prior chest CT scan from 2016.

## 2018-11-01 NOTE — PROGRESS NOTES
Spoke with patient about oxygen. Plan to send order to [facility] [place] to get testing done to qualify patient for POC. Orders faxed today.

## 2018-11-01 NOTE — MR AVS SNAPSHOT
After Visit Summary   11/1/2018    Betty Tee    MRN: [id number]           Patient Information     Date Of Birth          1940        Visit Information        Provider Department      11/1/2018 9:00 AM Maryjane Tillman MD [facility]        Today's Diagnoses     ILD (interstitial lung disease) (H)    -  1    Follicular bronchiolitis (H)           Follow-ups after your visit        Follow-up notes from your care team     Return in about 3 months (around 2/1/2019).      Your next 10 appointments already scheduled     Nov 07, 2018  3:40 PM CST   (Arrive by 3:25 PM)   NEW GENERAL with Jen Clark MD   [facility]hthal[facility] ([facility])    [address]  4th Floor  [place] [id number] [phone]            Nov 28, 2018 12:45 PM CST   Office Visit with Ilene Tristan MD   [facility] ([facility])    [address] [id number] [phone]           Bring a current list of meds and any records pertaining to this visit. For Physicals, please bring immunization records and any forms needing to be filled out. Please arrive 10 minutes early to complete paperwork.            Dec 06, 2018  3:00 PM CST   (Arrive by 2:45 PM)   Return Visit with Carmenza Stringer MD   [facility] Rheumatology ([facility])    [address]  [address]  [place] [id number] [phone]            Feb 14, 2019  2:00 PM CST   Ech Complete with [email or web address]   [facility] Echo ([facility])    [address]  3rd Floor  [place] [id number] [phone]           1.  Please bring or wear a comfortable two-piece outfit. 2.  You may eat, drink and take your normal medicines. 3.  For any questions that cannot be answered, please contact the ordering physician 4.  Please do not wear perfumes or scented lotions on the  day of your exam.            Feb 14, 2019  3:00 PM CST   Lab with UC LAB   [facility] ([facility])    [address]  1st Floor  [place] [address]   [phone]            Feb 14, 2019  3:30 PM CST   (Arrive by 3:15 PM)   RETURN HEART FAILURE with Radha Rosa MD   [facility] Heart Care ([facility])    [address]  [address]  [place] [address]   [phone]            Mar 01, 2019 10:30 AM CST   FULL PULMONARY FUNCTION with UC PFL D   [facility] Pulmonary Function Testing ([facility])    [address]  3rd Floor  [place] [address] [phone]            Mar 01, 2019 11:30 AM CST   (Arrive by 11:15 AM)   Return Interstitial Lung with Maryjane Tillman MD   [facility] ([facility])    [address]  [address]  [place] [address] [phone]              Future tests that were ordered for you today     Open Future Orders        [facility] Expected Expires Ordered    General PFT Lab (Please always keep checked) Routine 2/1/2019 11/1/2019 11/1/2018    Pulmonary Function Test Routine 2/1/2019 11/1/2019 11/1/2018            Who to contact     If you have questions or need follow up information about today's clinic visit or your schedule please contact [facility] directly at [phone].  Normal or non-critical lab and imaging results will be communicated to you by MyChart, letter or phone within 4 business days after the clinic has received the results. If you do not hear from us within 7 days, please contact the clinic through [employer] or phone. If you have a critical or abnormal lab result, we will notify you by phone as soon as possible.  Submit refill requests through [employer] or call your pharmacy and they will forward the refill request to us. Please allow 3 business days for your refill  "to be completed.          Additional Information About Your Visit        MyChart Information     [employer] gives you secure access to your electronic health record. If you see a primary care provider, you can also send messages to your care team and make appointments. If you have questions, please call your primary care clinic.  If you do not have a primary care provider, please call [phone] and they will assist you.        Care EveryWhere ID     This is your Care EveryWhere ID. This could be used by other organizations to access your [facility] medical records  [id number]        Your Vitals Were     Pulse Respirations Height Pulse Oximetry BMI (Body Mass Index)       73 17 1.702 m (5' 7\") 96% 33.05 kg/m2        Blood Pressure from Last 3 Encounters:   11/01/18 109/73   10/22/18 110/80   10/02/18 100/69    Weight from Last 3 Encounters:   11/01/18 95.7 kg (211 lb)   10/22/18 96 kg (211 lb 9.6 oz)   10/02/18 96.2 kg (212 lb)                 Today's Medication Changes          These changes are accurate as of 11/1/18  9:38 AM.  If you have any questions, ask your nurse or doctor.               These medicines have changed or have updated prescriptions.        Dose/Directions    acyclovir 5 % ointment   Commonly known as:  ZOVIRAX   This may have changed:  additional instructions   Used for:  Recurrent cold sores        Apply topically 6 times daily   Quantity:  15 g   Refills:  3       fluticasone 50 MCG/ACT spray   Commonly known as:  FLONASE   This may have changed:    - when to take this  - reasons to take this   Used for:  Seasonal allergic rhinitis        Dose:  1-2 spray   Spray 1-2 sprays into both nostrils daily   Quantity:  16 g   Refills:  5                Primary Care Provider Office Phone # Fax #    Ilene Tristan [phone] [address] [address]        Equal Access to Services     EGNNY STONER AH: rogelio Grimm, " yaneth damian heideanderson christina ah. So [facility] [phone].    ATENCIÓN: Si jewel covarrubias, tiene a conti disposición servicios gratuitos de asistencia lingüística. Noel al [phone].    We comply with applicable federal civil rights laws and [place] laws. We do not discriminate on the basis of race, color, national origin, age, disability, sex, sexual orientation, or gender identity.            Thank you!     Thank you for choosing [facility]  for your care. Our goal is always to provide you with excellent care. Hearing back from our patients is one way we can continue to improve our services. Please take a few minutes to complete the written survey that you may receive in the mail after your visit with us. Thank you!             Your Updated Medication List - Protect others around you: Learn how to safely use, store and throw away your medicines at www.disposemymeds.org.          This list is accurate as of 11/1/18  9:38 AM.  Always use your most recent med list.                   Brand Name Dispense Instructions for use Diagnosis    acyclovir 400 MG tablet    ZOVIRAX    15 tablet    Take 1 tablet (400 mg) by mouth 3 times daily For a couple days    Herpes labialis       acyclovir 5 % ointment    ZOVIRAX    15 g    Apply topically 6 times daily    Recurrent cold sores       albuterol 108 (90 Base) MCG/ACT inhaler    PROAIR HFA/PROVENTIL HFA/VENTOLIN HFA    1 Inhaler    Inhale 2 puffs into the lungs every 6 hours    ILD (interstitial lung disease) (H)       alendronate 70 MG tablet    FOSAMAX    4 tablet    Take 1 tablet (70 mg) by mouth every 7 days    Other osteoporosis without current pathological fracture       atorvastatin 10 MG tablet    LIPITOR    90 tablet    Take 1 tablet (10 mg) by mouth daily    Hyperlipidemia LDL goal <100       BASAGLAR 100 UNIT/ML injection     15 mL    Inject 25 Units Subcutaneous daily (to replace lantus)    Type 2  diabetes mellitus with hyperglycemia, with long-term current use of insulin (H)       * blood glucose monitoring lancets     4 Box    Use to test blood sugar 4 times daily or as directed.    Type 2 diabetes mellitus without complication, without long-term current use of insulin (H)       * blood glucose monitoring lancets     1 Box    Use to test blood sugar 4 times daily or as directed.  Ok to substitute alternative if insurance prefers.    Type 2 diabetes mellitus without complication, without long-term current use of insulin (H)       * blood glucose monitoring test strip    ACCU-CHEK SHANNON PLUS    120 strip    Use to test blood sugar 4 times daily or as directed.  Ok to substitute alternative if insurance prefers.    Type 2 diabetes mellitus without complication, without long-term current use of insulin (H)       * ACCU-CHEK SHANNON PLUS test strip   Generic drug:  blood glucose monitoring     400 strip    USE TO TEST BLOOD SUGARS 4 TIMES DAILY OR AS DIRECTED    Type 2 diabetes mellitus without complication, without long-term current use of insulin (H)       COMPRESSION STOCKINGS     2 each    Wear compression stockings in affected leg (right leg) or both legs most of the time during the day and take them off at night.    DVT (deep venous thrombosis), right, Postphlebitic syndrome, Chronic anticoagulation, Atrial fibrillation (H)       escitalopram 20 MG tablet    LEXAPRO    90 tablet    TAKE 1 TABLET (20 MG) BY MOUTH DAILY    Major depressive disorder, recurrent episode, moderate (H)       fluticasone 220 MCG/ACT Inhaler    FLOVENT HFA    3 Inhaler    Inhale 2 puffs into the lungs 2 times daily    ILD (interstitial lung disease) (H), Follicular bronchiolitis (H)       fluticasone 50 MCG/ACT spray    FLONASE    16 g    Spray 1-2 sprays into both nostrils daily    Seasonal allergic rhinitis       gabapentin 300 MG capsule    NEURONTIN    30 capsule    Take 1 capsule (300 mg) by mouth At Bedtime    Sciatica,  unspecified laterality       Humidifier Misc     1 each    Continuous humidified air via concentrator.  Diagnosis: ILD Duration: Lifetime 99.    ILD (interstitial lung disease) (H)       * insulin pen needle 31G X 8 MM    B-D U/F    400 each    Use 4 times daily (with Lantus and Novolog) or as directed.    Type 2 diabetes mellitus without complication, without long-term current use of insulin (H)       * BD JANE U/F 32G X 4 MM   Generic drug:  insulin pen needle     400 each    USE 4 DAILY AS DIRECTED.    Type 2 diabetes mellitus without complication, without long-term current use of insulin (H)       ketoconazole 2 % cream    NIZORAL    60 g    Apply topically 2 times daily    Cutaneous candidiasis       lidocaine 5 % Patch    LIDODERM    30 patch    Apply patch to painful area for up to 12 h within a 24 h period.  Remove after 12 hours.    Sacral back pain       lisinopril 2.5 MG tablet    PRINIVIL/Zestril    90 tablet    Take 1 tablet (2.5 mg) by mouth daily    Essential hypertension with goal blood pressure less than 140/90       metFORMIN 500 MG 24 hr tablet    GLUCOPHAGE-XR    180 tablet    TAKE 2 TABLETS BY MOUTH DAILY WITH DINNER    Type 2 diabetes mellitus without complication, without long-term current use of insulin (H)       * metoprolol succinate 25 MG 24 hr tablet    TOPROL XL    90 tablet    Take 0.5 tablets (12.5 mg) by mouth 2 times daily Take 50 mg by mouth in combination with 12.5 for a total of 62.5 twice a day    (HFpEF) heart failure with preserved ejection fraction (H), Persistent atrial fibrillation (H)       * metoprolol succinate 100 MG 24 hr tablet    TOPROL-XL    90 tablet    Take 50 mg by mouth in combination with 12.5 for a total of 62.5 twice a day    Atrial fibrillation with controlled ventricular response (H)       montelukast 10 MG tablet    SINGULAIR    90 tablet    TAKE 1 TABLET BY MOUTH EVERYDAY AT BEDTIME    Sjogren's syndrome with lung involvement (H), ILD (interstitial lung  disease) (H)       NovoLOG FLEXPEN 100 UNIT/ML injection   Generic drug:  insulin aspart     15 mL    INJECT 12 UNITS SUBCUTANEOUS 3 TIMES DAILY (WITH MEALS)    Type 2 diabetes mellitus with other specified complication (H)       * order for DME     1 Device    Oxygen: Patient requires supplemental Oxygen 4 LPM via nasal canula with activity. Please provide patient with a home unit and with portability capability. Oxygen will be for a lifetime.    Hypoxia, ILD (interstitial lung disease) (H)       * order for DME     1 each    Equipment being ordered: Full face mask for CPAP - size: F10 large    ANGELICA (obstructive sleep apnea)       order for DME     1 Device    Equipment being ordered: Oxygen Pulsed oxygen portable tank Rate: 4LNC Diagnosis: ILD Duration: Lifetime -99    ILD (interstitial lung disease) (H)       potassium chloride SA 20 MEQ CR tablet    K-DUR/KLOR-CON M    270 tablet    Take 2 tabs (40 meq) in am and 1 tab (20 meq) in pm daily    (HFpEF) heart failure with preserved ejection fraction (H)       * predniSONE 10 MG tablet    DELTASONE    90 tablet    Take 2 tablets for three days (4/12-14), then take 1 tablet daily (10 mg daily starting 4/15)    ILD (interstitial lung disease) (H), Sjogren's syndrome with lung involvement (H)       * predniSONE 1 MG tablet    DELTASONE    120 tablet    Use with 5 mg tabs to taper: 10 mg and 9 mg every other day for 1m, 9 mg/day for 1m, 9 mg and 8 mg every other day for 1m, etc. until at 7mg    Primary Sjogren's syndrome (H)       * predniSONE 5 MG tablet    DELTASONE    30 tablet    Use with 1 mg tabs to taper: 10 mg and 9 mg every other day for 1m, 9 mg/day for 1m, 9 mg and 8 mg every other day for 1m, etc. until at 7mg    Primary Sjogren's syndrome (H)       spironolactone 25 MG tablet    ALDACTONE    180 tablet    Take 2 tablets (50 mg) by mouth daily    Chronic diastolic congestive heart failure (H)       tiZANidine 2 MG tablet    ZANAFLEX    90 tablet    Take 1-2  tablets (2-4 mg) by mouth 3 times daily    Right-sided low back pain with right-sided sciatica, unspecified chronicity       torsemide 20 MG tablet    DEMADEX    105 tablet    Take 40 mg in the morning, 30 mg in the afternoon.    (HFpEF) heart failure with preserved ejection fraction (H)       * traZODone 50 MG tablet    DESYREL    180 tablet    TAKE 1/2-1 TABLET BY MOUTH NIGHTLY AS NEEDED, CAN TITRATE DOWN TO 1/2TAB OR UP TO 2TABS AS NEEDED    Insomnia due to medical condition       * traZODone 50 MG tablet    DESYREL    30 tablet    Take 0.5-1 tablets (25-50 mg) by mouth nightly as needed for sleep    Insomnia due to medical condition       TYLENOL 500 MG tablet   Generic drug:  acetaminophen      Take 500 mg by mouth nightly as needed    Dizziness       warfarin 7.5 MG tablet    COUMADIN    90 tablet    TAKE 1 TABLET BY MOUTH DAILY. CURRENT DOSE IS 7.5 MG DAILY. DOSE ADJUSTED PER INR RESULT.    Atrial fibrillation with controlled ventricular response (H)       * Notice:  This list has 15 medication(s) that are the same as other medications prescribed for you. Read the directions carefully, and ask your doctor or other care provider to review them with you.

## 2018-11-02 ENCOUNTER — TRANSFERRED RECORDS (OUTPATIENT)
Dept: HEALTH INFORMATION MANAGEMENT | Facility: CLINIC | Age: 78
End: 2018-11-02

## 2018-11-04 DIAGNOSIS — E78.5 HYPERLIPIDEMIA LDL GOAL <100: ICD-10-CM

## 2018-11-05 RX ORDER — ATORVASTATIN CALCIUM 10 MG/1
TABLET, FILM COATED ORAL
Qty: 90 TABLET | Refills: 0 | Status: SHIPPED | OUTPATIENT
Start: 2018-11-05 | End: 2019-02-03

## 2018-11-05 NOTE — TELEPHONE ENCOUNTER
"Prescription approved per [facility] Refill Protocol.  Sophia Hemphill RN    Requested Prescriptions   Signed Prescriptions Disp Refills     atorvastatin (LIPITOR) 10 MG tablet 90 tablet 0     Sig: TAKE 1 TABLET (10 MG) BY MOUTH DAILY    Statins Protocol Passed    11/4/2018  3:23 PM       Passed - LDL on file in past 12 months    Recent Labs   Lab Test  05/23/18   1028   LDL  21            Passed - No abnormal creatine kinase in past 12 months    Recent Labs   Lab Test  07/24/15   1236   CKT  57               Passed - Recent (12 mo) or future (30 days) visit within the authorizing provider's specialty    Patient had office visit in the last 12 months or has a visit in the next 30 days with authorizing provider or within the authorizing provider's specialty.  See \"Patient Info\" tab in inbasket, or \"Choose Columns\" in Meds & Orders section of the refill encounter.             Passed - Patient is age 18 or older       Passed - No active pregnancy on record       Passed - No positive pregnancy test in past 12 months          "

## 2018-11-07 ENCOUNTER — OFFICE VISIT (OUTPATIENT)
Dept: OPHTHALMOLOGY | Facility: CLINIC | Age: 78
End: 2018-11-07
Attending: STUDENT IN AN ORGANIZED HEALTH CARE EDUCATION/TRAINING PROGRAM
Payer: MEDICARE

## 2018-11-07 DIAGNOSIS — Z79.4 TYPE 2 DIABETES MELLITUS WITH HYPERGLYCEMIA, WITH LONG-TERM CURRENT USE OF INSULIN (H): Primary | ICD-10-CM

## 2018-11-07 DIAGNOSIS — M35.02 SJOGREN'S SYNDROME WITH LUNG INVOLVEMENT (H): ICD-10-CM

## 2018-11-07 DIAGNOSIS — E11.65 TYPE 2 DIABETES MELLITUS WITH HYPERGLYCEMIA, WITH LONG-TERM CURRENT USE OF INSULIN (H): Primary | ICD-10-CM

## 2018-11-07 DIAGNOSIS — Z96.1 PSEUDOPHAKIA OF BOTH EYES: ICD-10-CM

## 2018-11-07 ASSESSMENT — REFRACTION_WEARINGRX
OS_AXIS: 135
OS_SPHERE: -0.25
OD_CYLINDER: +0.50
OS_ADD: +2.75
OD_AXIS: 175
OD_ADD: +2.50
OS_CYLINDER: +0.75
SPECS_TYPE: LINED BIF
OD_SPHERE: PLANO

## 2018-11-07 ASSESSMENT — REFRACTION_MANIFEST
OS_SPHERE: PLANO
OD_CYLINDER: +0.50
OD_ADD: +3.00
OS_CYLINDER: +0.50
OS_ADD: +3.00
OD_SPHERE: PLANO
OD_AXIS: 180
OS_AXIS: 140

## 2018-11-07 ASSESSMENT — SLIT LAMP EXAM - LIDS
COMMENTS: NORMAL, DECREASED TEAR LAKE
COMMENTS: NORMAL, DECREASED TEAR LAKE

## 2018-11-07 ASSESSMENT — CONF VISUAL FIELD
METHOD: COUNTING FINGERS
OD_NORMAL: 1

## 2018-11-07 ASSESSMENT — CUP TO DISC RATIO
OD_RATIO: 0.3
OS_RATIO: 0.3

## 2018-11-07 ASSESSMENT — EXTERNAL EXAM - RIGHT EYE: OD_EXAM: NORMAL

## 2018-11-07 ASSESSMENT — VISUAL ACUITY
METHOD: SNELLEN - LINEAR
CORRECTION_TYPE: GLASSES
OD_CC: 20/20
OS_CC: 20/20

## 2018-11-07 ASSESSMENT — TONOMETRY
OS_IOP_MMHG: 13
IOP_METHOD: ICARE
OD_IOP_MMHG: 10

## 2018-11-07 ASSESSMENT — EXTERNAL EXAM - LEFT EYE: OS_EXAM: NORMAL

## 2018-11-07 NOTE — PROGRESS NOTES
78yoF presents for annual diabetic eye exam    [facility] 2016    HPI: States vision is ok in current glasses but that eyes get very tired over time. Feels dependent on glasses for reading. Distance vision ok. Eyes have mild crusting in corners in morning, none throughout the day. Denies any pain, discomfort, redness, itching, or tears. Denies flashes, has rare old floaters. Wears glasses full time. Denies h/o CTL wear.    Blood sugars around 120s.     OphthoMeds:  None    POH:  Glasses  DM A1c 6.7 10/2/18  S/p CE/IOL BE [facility] >5 years ago    FamHx:  Glaucoma in sister and uncle              A&P    1. DM  -no retinopathy  -recommend BS/BP control    2. Pseudophakia BE  -s/p yag cap BE      3. Sjogrens's  -mild dry eye  -also uses CPAP  -rec refresh pm or systane nighttime (or generic PF equivalent)     RTC 1 year    Complete documentation of historical and exam elements from today's encounter can be found in the full encounter summary report (not reduplicated in this progress note). I personally obtained the chief complaint(s) and history of present illness.  I confirmed and edited as necessary the review of systems, past medical/surgical history, family history, social history, and examination findings as documented by others; and I examined the patient myself. I personally reviewed the relevant tests, images, and reports as documented above. I formulated and edited as necessary the assessment and plan and discussed the findings and management plan with the patient and family.     Jen Clark MD

## 2018-11-07 NOTE — MR AVS SNAPSHOT
After Visit Summary   11/7/2018    Betty Tee    MRN: [id number]           Patient Information     Date Of Birth          1940        Visit Information        Provider Department      11/7/2018 3:40 PM Jen Clark MD [facility] Ophthalmology        Today's Diagnoses     Type 2 diabetes mellitus with hyperglycemia, with long-term current use of insulin (H)    -  1    Pseudophakia of both eyes        Sjogren's syndrome with lung involvement (H)           Follow-ups after your visit        Follow-up notes from your care team     Return in about 1 year (around 11/7/2019).      Your next 10 appointments already scheduled     Nov 28, 2018 12:45 PM CST   Office Visit with Ilene Tristan MD   [facility] ([facility])    [address] [id number] [phone]           Bring a current list of meds and any records pertaining to this visit. For Physicals, please bring immunization records and any forms needing to be filled out. Please arrive 10 minutes early to complete paperwork.            Dec 06, 2018  3:00 PM CST   (Arrive by 2:45 PM)   Return Visit with Carmenza Stringer MD   [facility] Rheumatology ([facility])    [address] [place] [address]  [address]  [facility] [id number] [phone]            Feb 14, 2019  2:00 PM CST   Ech Complete with [facility] [facility] ([facility])    [address] [place] [address]  3rd Floor  [facility] [id number] [phone]           1.  Please bring or wear a comfortable two-piece outfit. 2.  You may eat, drink and take your normal medicines. 3.  For any questions that cannot be answered, please contact the ordering physician 4.  Please do not wear perfumes or scented lotions on the day of your exam.            Feb 14, 2019  3:00 PM CST   Lab with  LAB   [facility] Lab ([facility])    [address] [place] [address]  Floor  [facility] [address]   [phone]            Feb 14, 2019  3:30 PM CST   (Arrive by 3:15 PM)   RETURN HEART FAILURE with Radha Rosa MD   [facility] ([facility])    [address]  [address]  [facility] [address]   [phone]            Mar 01, 2019 10:30 AM CST   FULL PULMONARY FUNCTION with UC PFL D   [facility] Pulmonary Function Testing ([facility])    [address]  3rd Floor  [facility] [address]   [phone]            Mar 01, 2019 11:30 AM CST   (Arrive by 11:15 AM)   Return Interstitial Lung with Maryjane Tillman MD   [facility] ([facility])    [address] [place] [address]  [address] [place] [id number] [phone]              Who to contact     Please call your clinic at [phone] to:    Ask questions about your health    Make or cancel appointments    Discuss your medicines    Learn about your test results    Speak to your doctor            Additional Information About Your Visit        [email or web address] Information     [employer] gives you secure access to your electronic health record. If you see a primary care provider, you can also send messages to your care team and make appointments. If you have questions, please call your primary care clinic.  If you do not have a primary care provider, please call [phone] and they will assist you.      [employer] is an electronic gateway that provides easy, online access to your medical records. With [employer], you can request a clinic appointment, read your test results, renew a prescription or communicate with your care team.     To access your existing account, please contact your [facility] or call [phone] for assistance.        Care EveryWhere ID     This is your Care EveryWhere ID. This could be used by other organizations to access your [facility]  records  [id number]         Blood Pressure from Last 3 Encounters:   11/01/18 109/73   10/22/18 110/80   10/02/18 100/69    Weight from Last 3 Encounters:   11/01/18 95.7 kg (211 lb)   10/22/18 96 kg (211 lb 9.6 oz)   10/02/18 96.2 kg (212 lb)              Today, you had the following     No orders found for display         Today's Medication Changes          These changes are accurate as of 11/7/18  5:27 PM.  If you have any questions, ask your nurse or doctor.               These medicines have changed or have updated prescriptions.        Dose/Directions    acyclovir 5 % ointment   Commonly known as:  ZOVIRAX   This may have changed:  additional instructions   Used for:  Recurrent cold sores        Apply topically 6 times daily   Quantity:  15 g   Refills:  3       fluticasone 50 MCG/ACT spray   Commonly known as:  FLONASE   This may have changed:    - when to take this  - reasons to take this   Used for:  Seasonal allergic rhinitis        Dose:  1-2 spray   Spray 1-2 sprays into both nostrils daily   Quantity:  16 g   Refills:  5                Primary Care Provider Office Phone # Fax #    Ilene Tristan [phone] [address] [address]        Equal Access to Services     GENNY STONER AH: Gilda sellers hadasho Soomaali, waaxda luqadaha, qaybta kaalmada adeegyada, waxay luis shaikh. So [facility] [phone].    ATENCIÓN: Si habla español, tiene a conti disposición servicios gratuitos de asistencia lingüística. Llame al [phone].    We comply with applicable federal civil rights laws and [place] laws. We do not discriminate on the basis of race, color, national origin, age, disability, sex, sexual orientation, or gender identity.            Thank you!     Thank you for choosing [facility] OPHTHALMOLOGY  for your care. Our goal is always to provide you with excellent care. Hearing back from our patients is one way we can continue to  improve our services. Please take a few minutes to complete the written survey that you may receive in the mail after your visit with us. Thank you!             Your Updated Medication List - Protect others around you: Learn how to safely use, store and throw away your medicines at www.disposemymeds.org.          This list is accurate as of 11/7/18  5:27 PM.  Always use your most recent med list.                   Brand Name Dispense Instructions for use Diagnosis    acyclovir 400 MG tablet    ZOVIRAX    15 tablet    Take 1 tablet (400 mg) by mouth 3 times daily For a couple days    Herpes labialis       acyclovir 5 % ointment    ZOVIRAX    15 g    Apply topically 6 times daily    Recurrent cold sores       albuterol 108 (90 Base) MCG/ACT inhaler    PROAIR HFA/PROVENTIL HFA/VENTOLIN HFA    1 Inhaler    Inhale 2 puffs into the lungs every 6 hours    ILD (interstitial lung disease) (H)       alendronate 70 MG tablet    FOSAMAX    4 tablet    Take 1 tablet (70 mg) by mouth every 7 days    Other osteoporosis without current pathological fracture       atorvastatin 10 MG tablet    LIPITOR    90 tablet    TAKE 1 TABLET (10 MG) BY MOUTH DAILY    Hyperlipidemia LDL goal <100       BASAGLAR 100 UNIT/ML injection     15 mL    Inject 25 Units Subcutaneous daily (to replace lantus)    Type 2 diabetes mellitus with hyperglycemia, with long-term current use of insulin (H)       * blood glucose monitoring lancets     4 Box    Use to test blood sugar 4 times daily or as directed.    Type 2 diabetes mellitus without complication, without long-term current use of insulin (H)       * blood glucose monitoring lancets     1 Box    Use to test blood sugar 4 times daily or as directed.  Ok to substitute alternative if insurance prefers.    Type 2 diabetes mellitus without complication, without long-term current use of insulin (H)       * blood glucose monitoring test strip    ACCU-CHEK SHANNON PLUS    120 strip    Use to test blood sugar 4  times daily or as directed.  Ok to substitute alternative if insurance prefers.    Type 2 diabetes mellitus without complication, without long-term current use of insulin (H)       * ACCU-CHEK SHANNON PLUS test strip   Generic drug:  blood glucose monitoring     400 strip    USE TO TEST BLOOD SUGARS 4 TIMES DAILY OR AS DIRECTED    Type 2 diabetes mellitus without complication, without long-term current use of insulin (H)       COMPRESSION STOCKINGS     2 each    Wear compression stockings in affected leg (right leg) or both legs most of the time during the day and take them off at night.    DVT (deep venous thrombosis), right, Postphlebitic syndrome, Chronic anticoagulation, Atrial fibrillation (H)       escitalopram 20 MG tablet    LEXAPRO    90 tablet    TAKE 1 TABLET (20 MG) BY MOUTH DAILY    Major depressive disorder, recurrent episode, moderate (H)       fluticasone 220 MCG/ACT Inhaler    FLOVENT HFA    3 Inhaler    Inhale 2 puffs into the lungs 2 times daily    ILD (interstitial lung disease) (H), Follicular bronchiolitis (H)       fluticasone 50 MCG/ACT spray    FLONASE    16 g    Spray 1-2 sprays into both nostrils daily    Seasonal allergic rhinitis       gabapentin 300 MG capsule    NEURONTIN    30 capsule    Take 1 capsule (300 mg) by mouth At Bedtime    Sciatica, unspecified laterality       Humidifier Misc     1 each    Continuous humidified air via concentrator.  Diagnosis: ILD Duration: Lifetime 99.    ILD (interstitial lung disease) (H)       * insulin pen needle 31G X 8 MM    B-D U/F    400 each    Use 4 times daily (with Lantus and Novolog) or as directed.    Type 2 diabetes mellitus without complication, without long-term current use of insulin (H)       * BD JANE U/F 32G X 4 MM   Generic drug:  insulin pen needle     400 each    USE 4 DAILY AS DIRECTED.    Type 2 diabetes mellitus without complication, without long-term current use of insulin (H)       ketoconazole 2 % cream    NIZORAL    60 g     Apply topically 2 times daily    Cutaneous candidiasis       lidocaine 5 % Patch    LIDODERM    30 patch    Apply patch to painful area for up to 12 h within a 24 h period.  Remove after 12 hours.    Sacral back pain       lisinopril 2.5 MG tablet    PRINIVIL/Zestril    90 tablet    Take 1 tablet (2.5 mg) by mouth daily    Essential hypertension with goal blood pressure less than 140/90       metFORMIN 500 MG 24 hr tablet    GLUCOPHAGE-XR    180 tablet    TAKE 2 TABLETS BY MOUTH DAILY WITH DINNER    Type 2 diabetes mellitus without complication, without long-term current use of insulin (H)       * metoprolol succinate 25 MG 24 hr tablet    TOPROL XL    90 tablet    Take 0.5 tablets (12.5 mg) by mouth 2 times daily Take 50 mg by mouth in combination with 12.5 for a total of 62.5 twice a day    (HFpEF) heart failure with preserved ejection fraction (H), Persistent atrial fibrillation (H)       * metoprolol succinate 100 MG 24 hr tablet    TOPROL-XL    90 tablet    Take 50 mg by mouth in combination with 12.5 for a total of 62.5 twice a day    Atrial fibrillation with controlled ventricular response (H)       montelukast 10 MG tablet    SINGULAIR    90 tablet    TAKE 1 TABLET BY MOUTH EVERYDAY AT BEDTIME    Sjogren's syndrome with lung involvement (H), ILD (interstitial lung disease) (H)       NovoLOG FLEXPEN 100 UNIT/ML injection   Generic drug:  insulin aspart     15 mL    INJECT 12 UNITS SUBCUTANEOUS 3 TIMES DAILY (WITH MEALS)    Type 2 diabetes mellitus with other specified complication (H)       * order for DME     1 Device    Oxygen: Patient requires supplemental Oxygen 4 LPM via nasal canula with activity. Please provide patient with a home unit and with portability capability. Oxygen will be for a lifetime.    Hypoxia, ILD (interstitial lung disease) (H)       * order for DME     1 each    Equipment being ordered: Full face mask for CPAP - size: F10 large    ANGELICA (obstructive sleep apnea)       order for DME      1 Device    Equipment being ordered: Oxygen Pulsed oxygen portable tank Rate: 4LNC Diagnosis: ILD Duration: Lifetime -99    ILD (interstitial lung disease) (H)       potassium chloride SA 20 MEQ CR tablet    K-DUR/KLOR-CON M    270 tablet    Take 2 tabs (40 meq) in am and 1 tab (20 meq) in pm daily    (HFpEF) heart failure with preserved ejection fraction (H)       * predniSONE 10 MG tablet    DELTASONE    90 tablet    Take 2 tablets for three days (4/12-14), then take 1 tablet daily (10 mg daily starting 4/15)    ILD (interstitial lung disease) (H), Sjogren's syndrome with lung involvement (H)       * predniSONE 1 MG tablet    DELTASONE    120 tablet    Use with 5 mg tabs to taper: 10 mg and 9 mg every other day for 1m, 9 mg/day for 1m, 9 mg and 8 mg every other day for 1m, etc. until at 7mg    Primary Sjogren's syndrome (H)       * predniSONE 5 MG tablet    DELTASONE    30 tablet    Use with 1 mg tabs to taper: 10 mg and 9 mg every other day for 1m, 9 mg/day for 1m, 9 mg and 8 mg every other day for 1m, etc. until at 7mg    Primary Sjogren's syndrome (H)       spironolactone 25 MG tablet    ALDACTONE    180 tablet    Take 2 tablets (50 mg) by mouth daily    Chronic diastolic congestive heart failure (H)       tiZANidine 2 MG tablet    ZANAFLEX    90 tablet    Take 1-2 tablets (2-4 mg) by mouth 3 times daily    Right-sided low back pain with right-sided sciatica, unspecified chronicity       torsemide 20 MG tablet    DEMADEX    105 tablet    Take 40 mg in the morning, 30 mg in the afternoon.    (HFpEF) heart failure with preserved ejection fraction (H)       * traZODone 50 MG tablet    DESYREL    180 tablet    TAKE 1/2-1 TABLET BY MOUTH NIGHTLY AS NEEDED, CAN TITRATE DOWN TO 1/2TAB OR UP TO 2TABS AS NEEDED    Insomnia due to medical condition       * traZODone 50 MG tablet    DESYREL    30 tablet    Take 0.5-1 tablets (25-50 mg) by mouth nightly as needed for sleep    Insomnia due to medical condition       TYLENOL  500 MG tablet   Generic drug:  acetaminophen      Take 500 mg by mouth nightly as needed    Dizziness       warfarin 7.5 MG tablet    COUMADIN    90 tablet    TAKE 1 TABLET BY MOUTH DAILY. CURRENT DOSE IS 7.5 MG DAILY. DOSE ADJUSTED PER INR RESULT.    Atrial fibrillation with controlled ventricular response (H)       * Notice:  This list has 15 medication(s) that are the same as other medications prescribed for you. Read the directions carefully, and ask your doctor or other care provider to review them with you.

## 2018-11-07 NOTE — NURSING NOTE
Chief Complaints and History of Present Illnesses   Patient presents with     Diabetic Eye Exam     routine eye care     HPI    Affected eye(s):  Both   Symptoms:     Difficulty with reading   Floaters   Eye discharge      Duration:  2 years   Frequency:  Constant       Do you have eye pain now?:  No      Comments:  Patient presents for annual diabetic eye exam. MAKENNA 2016, [facility]. The vision is okay in current glasses, the eyes get very tired, feels dependent on glasses for reading. The eys have discharge in the morning. No pain or discomfort, no redness itching or tears. No flashes, rare floaters. No eye drops. Wears glasses FT, no h/o CLS wear. No sx/no inj. Aretha See COT 3:59 PM November 7, 2018

## 2018-11-08 LAB
DLCOUNC-%PRED-PRE: 86 %
DLCOUNC-PRE: 17.46 ML/MIN/MMHG
DLCOUNC-PRED: 20.19 ML/MIN/MMHG
ERV-%PRED-PRE: 11 %
ERV-PRE: 0.03 L
ERV-PRED: 0.27 L
EXPTIME-PRE: 6.18 SEC
FEF2575-%PRED-PRE: 79 %
FEF2575-PRE: 1.3 L/SEC
FEF2575-PRED: 1.65 L/SEC
FEFMAX-%PRED-PRE: 77 %
FEFMAX-PRE: 3.99 L/SEC
FEFMAX-PRED: 5.17 L/SEC
FEV1-%PRED-PRE: 65 %
FEV1-PRE: 1.36 L
FEV1FEV6-PRE: 80 %
FEV1FEV6-PRED: 78 %
FEV1FVC-PRE: 80 %
FEV1FVC-PRED: 74 %
FEV1SVC-PRE: 86 %
FEV1SVC-PRED: 67 %
FIFMAX-PRE: 2.97 L/SEC
FRCPLETH-%PRED-PRE: 85 %
FRCPLETH-PRE: 2.4 L
FRCPLETH-PRED: 2.81 L
FVC-%PRED-PRE: 62 %
FVC-PRE: 1.7 L
FVC-PRED: 2.73 L
IC-%PRED-PRE: 55 %
IC-PRE: 1.55 L
IC-PRED: 2.8 L
RVPLETH-%PRED-PRE: 104 %
RVPLETH-PRE: 2.36 L
RVPLETH-PRED: 2.27 L
TLCPLETH-%PRED-PRE: 75 %
TLCPLETH-PRE: 3.94 L
TLCPLETH-PRED: 5.19 L
VA-%PRED-PRE: 52 %
VA-PRE: 2.8 L
VC-%PRED-PRE: 51 %
VC-PRE: 1.58 L
VC-PRED: 3.07 L

## 2018-11-18 DIAGNOSIS — E78.5 HYPERLIPIDEMIA LDL GOAL <100: ICD-10-CM

## 2018-11-19 RX ORDER — ATORVASTATIN CALCIUM 10 MG/1
TABLET, FILM COATED ORAL
Start: 2018-11-19

## 2018-11-19 NOTE — TELEPHONE ENCOUNTER
"Denied  Too early; Rx sent 11/5/2018 for 90 days  Lydia HALEY, RN    Requested Prescriptions   Pending Prescriptions Disp Refills     atorvastatin (LIPITOR) 10 MG tablet [Pharmacy Med Name: ATORVASTATIN 10 MG TABLET] 90 tablet 0     Sig: TAKE 1 TABLET (10 MG) BY MOUTH DAILY    Statins Protocol Passed    11/18/2018  4:31 PM       Passed - LDL on file in past 12 months    Recent Labs   Lab Test  05/23/18   1028   LDL  21            Passed - No abnormal creatine kinase in past 12 months    Recent Labs   Lab Test  07/24/15   1236   CKT  57               Passed - Recent (12 mo) or future (30 days) visit within the authorizing provider's specialty    Patient had office visit in the last 12 months or has a visit in the next 30 days with authorizing provider or within the authorizing provider's specialty.  See \"Patient Info\" tab in inbasket, or \"Choose Columns\" in Meds & Orders section of the refill encounter.             Passed - Patient is age 18 or older       Passed - No active pregnancy on record       Passed - No positive pregnancy test in past 12 months        Next 5 appointments (look out 90 days)     Nov 28, 2018 12:45 PM CST   Office Visit with Ilene Tristan MD   [facility] [place] ([facility])    [address] [place] [address] [phone]                  "

## 2018-11-28 ENCOUNTER — OFFICE VISIT (OUTPATIENT)
Dept: FAMILY MEDICINE | Facility: CLINIC | Age: 78
End: 2018-11-28
Payer: MEDICARE

## 2018-11-28 ENCOUNTER — TELEPHONE (OUTPATIENT)
Dept: FAMILY MEDICINE | Facility: CLINIC | Age: 78
End: 2018-11-28

## 2018-11-28 VITALS
HEART RATE: 71 BPM | OXYGEN SATURATION: 92 % | HEIGHT: 67 IN | BODY MASS INDEX: 33.12 KG/M2 | DIASTOLIC BLOOD PRESSURE: 77 MMHG | SYSTOLIC BLOOD PRESSURE: 111 MMHG | RESPIRATION RATE: 14 BRPM | TEMPERATURE: 98 F | WEIGHT: 211 LBS

## 2018-11-28 DIAGNOSIS — B37.2 CUTANEOUS CANDIDIASIS: ICD-10-CM

## 2018-11-28 DIAGNOSIS — Z79.01 LONG TERM CURRENT USE OF ANTICOAGULANT THERAPY: ICD-10-CM

## 2018-11-28 DIAGNOSIS — J44.89 FOLLICULAR BRONCHIOLITIS (H): ICD-10-CM

## 2018-11-28 DIAGNOSIS — J84.9 ILD (INTERSTITIAL LUNG DISEASE) (H): ICD-10-CM

## 2018-11-28 DIAGNOSIS — M19.90 INFLAMMATORY ARTHRITIS: ICD-10-CM

## 2018-11-28 DIAGNOSIS — J30.1 SEASONAL ALLERGIC RHINITIS DUE TO POLLEN: ICD-10-CM

## 2018-11-28 DIAGNOSIS — I48.21 PERMANENT ATRIAL FIBRILLATION (H): ICD-10-CM

## 2018-11-28 DIAGNOSIS — I10 ESSENTIAL HYPERTENSION WITH GOAL BLOOD PRESSURE LESS THAN 140/90: ICD-10-CM

## 2018-11-28 DIAGNOSIS — Z79.4 TYPE 2 DIABETES MELLITUS WITH HYPERGLYCEMIA, WITH LONG-TERM CURRENT USE OF INSULIN (H): Primary | ICD-10-CM

## 2018-11-28 DIAGNOSIS — B00.1 RECURRENT COLD SORES: ICD-10-CM

## 2018-11-28 DIAGNOSIS — E11.65 TYPE 2 DIABETES MELLITUS WITH HYPERGLYCEMIA, WITH LONG-TERM CURRENT USE OF INSULIN (H): Primary | ICD-10-CM

## 2018-11-28 DIAGNOSIS — G47.01 INSOMNIA DUE TO MEDICAL CONDITION: ICD-10-CM

## 2018-11-28 DIAGNOSIS — E78.5 HYPERLIPIDEMIA LDL GOAL <100: ICD-10-CM

## 2018-11-28 LAB — INR POINT OF CARE: 2.5 (ref 0.86–1.14)

## 2018-11-28 PROCEDURE — 36416 COLLJ CAPILLARY BLOOD SPEC: CPT | Performed by: FAMILY MEDICINE

## 2018-11-28 PROCEDURE — 99215 OFFICE O/P EST HI 40 MIN: CPT | Performed by: FAMILY MEDICINE

## 2018-11-28 PROCEDURE — 99207 ZZC NO CHARGE NURSE ONLY: CPT | Performed by: FAMILY MEDICINE

## 2018-11-28 PROCEDURE — 85610 PROTHROMBIN TIME: CPT | Mod: QW | Performed by: FAMILY MEDICINE

## 2018-11-28 RX ORDER — TRAZODONE HYDROCHLORIDE 50 MG/1
25-50 TABLET, FILM COATED ORAL
Qty: 30 TABLET | Refills: 5 | Status: SHIPPED | OUTPATIENT
Start: 2018-11-28 | End: 2019-09-03

## 2018-11-28 RX ORDER — FLUTICASONE PROPIONATE 220 UG/1
2 AEROSOL, METERED RESPIRATORY (INHALATION) 2 TIMES DAILY
Qty: 3 INHALER | Refills: 3 | Status: SHIPPED | OUTPATIENT
Start: 2018-11-28 | End: 2019-11-14

## 2018-11-28 RX ORDER — KETOCONAZOLE 20 MG/G
CREAM TOPICAL 2 TIMES DAILY
Qty: 60 G | Refills: 1 | Status: SHIPPED | OUTPATIENT
Start: 2018-11-28 | End: 2020-04-27

## 2018-11-28 RX ORDER — ACYCLOVIR 50 MG/G
OINTMENT TOPICAL
Qty: 15 G | Refills: 3 | Status: SHIPPED | OUTPATIENT
Start: 2018-11-28 | End: 2019-12-03

## 2018-11-28 RX ORDER — FLUTICASONE PROPIONATE 50 MCG
1-2 SPRAY, SUSPENSION (ML) NASAL DAILY PRN
Qty: 1 BOTTLE | Refills: 3 | Status: SHIPPED | OUTPATIENT
Start: 2018-11-28 | End: 2019-11-14

## 2018-11-28 RX ORDER — ACYCLOVIR 400 MG/1
400 TABLET ORAL 3 TIMES DAILY
Qty: 15 TABLET | Refills: 2 | Status: SHIPPED | OUTPATIENT
Start: 2018-11-28 | End: 2019-12-03

## 2018-11-28 NOTE — NURSING NOTE
"Chief Complaint   Patient presents with     RECHECK     8 week follow up:  pulmonary/cardiology/rheum updates- did not discuss at last visit.       Initial /77  Pulse 71  Temp 98  F (36.7  C) (Oral)  Resp 14  Ht 5' 7\" (1.702 m)  Wt 211 lb (95.7 kg)  SpO2 92%  Breastfeeding? No  BMI 33.05 kg/m2 Estimated body mass index is 33.05 kg/(m^2) as calculated from the following:    Height as of this encounter: 5' 7\" (1.702 m).    Weight as of this encounter: 211 lb (95.7 kg).  BP completed using cuff size: large    Health Maintenance that is potentially due pending provider review:  Health Maintenance Due   Topic Date Due     HF ACTION PLAN Q3 YR  1940     COPD ACTION PLAN Q1 YR  1940     MEDICARE ANNUAL WELLNESS VISIT  08/06/2015     FOOT EXAM Q1 YEAR  07/05/2018     CBC Q1 YR  11/08/2018     OP ANNUAL INR REFERRAL  10/05/2018         Per provider  "

## 2018-11-28 NOTE — MR AVS SNAPSHOT
After Visit Summary   11/28/2018    Betty Tee    MRN: [id number]           Patient Information     Date Of Birth          1940        Visit Information        Provider Department      11/28/2018 12:45 PM Ilene Tristan MD [facility]        Today's Diagnoses     Seasonal allergic rhinitis due to pollen    -  1    Recurrent cold sores        Herpes labialis        ILD (interstitial lung disease) (H)        Follicular bronchiolitis (H)        Cutaneous candidiasis        Insomnia due to medical condition           Follow-ups after your visit        Follow-up notes from your care team     Return in about 3 months (around 2/28/2019) for 45 min appt- Diabetes, Depression follow-up, BP Recheck.      Your next 10 appointments already scheduled     Dec 06, 2018  3:00 PM CST   (Arrive by 2:45 PM)   Return Visit with Carmenza Stringer MD   [facility] Rheumatology ([facility])    [address] [place] [address]  [address]  [facility] [address] [phone]            Feb 14, 2019  2:00 PM CST   Ech Complete with UCECH2   [facility] Echo ([facility])    [address] [place] [address]  3rd Floor  [facility] [address] [phone]           1.  Please bring or wear a comfortable two-piece outfit. 2.  You may eat, drink and take your normal medicines. 3.  For any questions that cannot be answered, please contact the ordering physician 4.  Please do not wear perfumes or scented lotions on the day of your exam.            Feb 14, 2019  3:00 PM CST   Lab with  LAB   [facility] Lab ([facility])    [address] [place] [address]  1st Floor  [facility] [address] [phone]            Feb 14, 2019  3:30 PM CST   (Arrive by 3:15 PM)   RETURN HEART FAILURE with Radha Rosa MD   [facility] Heart Care ([facility])    [address] [place] [address]  [address]  [facility] [address] [phone]  "           Mar 01, 2019 10:30 AM CST   FULL PULMONARY FUNCTION with  PFL D   [facility] Pulmonary Function Testing ([facility])    [address]  3rd Floor  [facility] [address] [phone]            Mar 01, 2019 11:30 AM CST   (Arrive by 11:15 AM)   Return Interstitial Lung with Maryjane Tillman MD   [facility] ([facility])    [address]  [address]  [facility] [address] [phone]              Who to contact     If you have questions or need follow up information about today's clinic visit or your schedule please contact [facility] directly at [phone].  Normal or non-critical lab and imaging results will be communicated to you by [employer], letter or phone within 4 business days after the clinic has received the results. If you do not hear from us within 7 days, please contact the clinic through [employer] or phone. If you have a critical or abnormal lab result, we will notify you by phone as soon as possible.  Submit refill requests through [employer] or call your pharmacy and they will forward the refill request to us. Please allow 3 business days for your refill to be completed.          Additional Information About Your Visit        [employer] Information     [employer] gives you secure access to your electronic health record. If you see a primary care provider, you can also send messages to your care team and make appointments. If you have questions, please call your primary care clinic.  If you do not have a primary care provider, please call [phone] and they will assist you.        Care EveryWhere ID     This is your Care EveryWhere ID. This could be used by other organizations to access your [facility] medical records  [id number]        Your Vitals Were     Pulse Temperature Respirations Height Pulse Oximetry Breastfeeding?    71 98  F (36.7  C) (Oral) 14 5' 7\" (1.702 m) 92% No    BMI " (Body Mass Index)                   33.05 kg/m2            Blood Pressure from Last 3 Encounters:   11/28/18 111/77   11/01/18 109/73   10/22/18 110/80    Weight from Last 3 Encounters:   11/28/18 211 lb (95.7 kg)   11/01/18 211 lb (95.7 kg)   10/22/18 211 lb 9.6 oz (96 kg)              Today, you had the following     No orders found for display         Today's Medication Changes          These changes are accurate as of 11/28/18  1:53 PM.  If you have any questions, ask your nurse or doctor.               These medicines have changed or have updated prescriptions.        Dose/Directions    acyclovir 5 % external ointment   Commonly known as:  ZOVIRAX   This may have changed:  additional instructions   Used for:  Recurrent cold sores   Changed by:  Ilene Tristan MD        Apply topically 6 times daily As needed for outbreaks   Quantity:  15 g   Refills:  3            Where to get your medicines      These medications were sent to [facility]/pharmacy #[id number] [facility], [place] [id number]     Phone:  [phone]     acyclovir 400 MG tablet    acyclovir 5 % external ointment    fluticasone 220 MCG/ACT inhaler    fluticasone 50 MCG/ACT nasal spray    ketoconazole 2 % external cream    traZODone 50 MG tablet                Primary Care Provider Office Phone # Fax #    Ilene Tristan [phone] [address] [address]        Equal Access to Services     [facility] HospitalSRIDEVI AH: Hadii aad ku hadasho Soomaali, waaxda luqadaha, qaybta kaalmada adeegyada, waxay idiin hayaan claudio shaikh. So [facility] [phone].    ATENCIÓN: Si habla español, tiene a conti disposición servicios gratuitos de asistencia lingüística. Llame al [phone].    We comply with applicable federal civil rights laws and [place] laws. We do not discriminate on the basis of race, color, national origin, age, disability, sex, sexual  orientation, or gender identity.            Thank you!     Thank you for choosing [facility]  for your care. Our goal is always to provide you with excellent care. Hearing back from our patients is one way we can continue to improve our services. Please take a few minutes to complete the written survey that you may receive in the mail after your visit with us. Thank you!             Your Updated Medication List - Protect others around you: Learn how to safely use, store and throw away your medicines at www.disposemymeds.org.          This list is accurate as of 11/28/18  1:53 PM.  Always use your most recent med list.                   Brand Name Dispense Instructions for use Diagnosis    acyclovir 400 MG tablet    ZOVIRAX    15 tablet    Take 1 tablet (400 mg) by mouth 3 times daily For a couple days    Herpes labialis       acyclovir 5 % external ointment    ZOVIRAX    15 g    Apply topically 6 times daily As needed for outbreaks    Recurrent cold sores       albuterol 108 (90 Base) MCG/ACT inhaler    PROAIR HFA/PROVENTIL HFA/VENTOLIN HFA    1 Inhaler    Inhale 2 puffs into the lungs every 6 hours    ILD (interstitial lung disease) (H)       alendronate 70 MG tablet    FOSAMAX    4 tablet    Take 1 tablet (70 mg) by mouth every 7 days    Other osteoporosis without current pathological fracture       atorvastatin 10 MG tablet    LIPITOR    90 tablet    TAKE 1 TABLET (10 MG) BY MOUTH DAILY    Hyperlipidemia LDL goal <100       * blood glucose monitoring lancets     4 Box    Use to test blood sugar 4 times daily or as directed.    Type 2 diabetes mellitus without complication, without long-term current use of insulin (H)       * blood glucose monitoring lancets     1 Box    Use to test blood sugar 4 times daily or as directed.  Ok to substitute alternative if insurance prefers.    Type 2 diabetes mellitus without complication, without long-term current use of insulin (H)       * blood glucose monitoring  test strip    ACCU-CHEK SHANNON PLUS    120 strip    Use to test blood sugar 4 times daily or as directed.  Ok to substitute alternative if insurance prefers.    Type 2 diabetes mellitus without complication, without long-term current use of insulin (H)       * ACCU-CHEK SHANNON PLUS test strip   Generic drug:  blood glucose monitoring     400 strip    USE TO TEST BLOOD SUGARS 4 TIMES DAILY OR AS DIRECTED    Type 2 diabetes mellitus without complication, without long-term current use of insulin (H)       COMPRESSION STOCKINGS     2 each    Wear compression stockings in affected leg (right leg) or both legs most of the time during the day and take them off at night.    DVT (deep venous thrombosis), right, Postphlebitic syndrome, Chronic anticoagulation, Atrial fibrillation (H)       escitalopram 20 MG tablet    LEXAPRO    90 tablet    TAKE 1 TABLET (20 MG) BY MOUTH DAILY    Major depressive disorder, recurrent episode, moderate (H)       fluticasone 220 MCG/ACT inhaler    FLOVENT HFA    3 Inhaler    Inhale 2 puffs into the lungs 2 times daily    ILD (interstitial lung disease) (H), Follicular bronchiolitis (H)       fluticasone 50 MCG/ACT nasal spray    FLONASE    1 Bottle    Spray 1-2 sprays into both nostrils daily as needed for allergies    Seasonal allergic rhinitis due to pollen       gabapentin 300 MG capsule    NEURONTIN    30 capsule    Take 1 capsule (300 mg) by mouth At Bedtime    Sciatica, unspecified laterality       Humidifier Misc     1 each    Continuous humidified air via concentrator.  Diagnosis: ILD Duration: Lifetime 99.    ILD (interstitial lung disease) (H)       insulin glargine 100 UNIT/ML pen     15 mL    Inject 25 Units Subcutaneous daily (to replace lantus)    Type 2 diabetes mellitus with hyperglycemia, with long-term current use of insulin (H)       * insulin pen needle 31G X 8 MM miscellaneous    B-D U/F    400 each    Use 4 times daily (with Lantus and Novolog) or as directed.    Type 2  diabetes mellitus without complication, without long-term current use of insulin (H)       * BD JANE U/F 32G X 4 MM miscellaneous   Generic drug:  insulin pen needle     400 each    USE 4 DAILY AS DIRECTED.    Type 2 diabetes mellitus without complication, without long-term current use of insulin (H)       ketoconazole 2 % external cream    NIZORAL    60 g    Apply topically 2 times daily    Cutaneous candidiasis       lidocaine 5 % patch    LIDODERM    30 patch    Apply patch to painful area for up to 12 h within a 24 h period.  Remove after 12 hours.    Sacral back pain       lisinopril 2.5 MG tablet    PRINIVIL/Zestril    90 tablet    Take 1 tablet (2.5 mg) by mouth daily    Essential hypertension with goal blood pressure less than 140/90       metFORMIN 500 MG 24 hr tablet    GLUCOPHAGE-XR    180 tablet    TAKE 2 TABLETS BY MOUTH DAILY WITH DINNER    Type 2 diabetes mellitus without complication, without long-term current use of insulin (H)       * metoprolol succinate 25 MG 24 hr tablet    TOPROL XL    90 tablet    Take 0.5 tablets (12.5 mg) by mouth 2 times daily Take 50 mg by mouth in combination with 12.5 for a total of 62.5 twice a day    (HFpEF) heart failure with preserved ejection fraction (H), Persistent atrial fibrillation (H)       * metoprolol succinate 100 MG 24 hr tablet    TOPROL-XL    90 tablet    Take 50 mg by mouth in combination with 12.5 for a total of 62.5 twice a day    Atrial fibrillation with controlled ventricular response (H)       montelukast 10 MG tablet    SINGULAIR    90 tablet    TAKE 1 TABLET BY MOUTH EVERYDAY AT BEDTIME    Sjogren's syndrome with lung involvement (H), ILD (interstitial lung disease) (H)       NovoLOG FLEXPEN 100 UNIT/ML pen   Generic drug:  insulin aspart     15 mL    INJECT 12 UNITS SUBCUTANEOUS 3 TIMES DAILY (WITH MEALS)    Type 2 diabetes mellitus with other specified complication (H)       * order for DME     1 Device    Oxygen: Patient requires supplemental  Oxygen 4 LPM via nasal canula with activity. Please provide patient with a home unit and with portability capability. Oxygen will be for a lifetime.    Hypoxia, ILD (interstitial lung disease) (H)       * order for DME     1 each    Equipment being ordered: Full face mask for CPAP - size: F10 large    ANGELICA (obstructive sleep apnea)       order for DME     1 Device    Equipment being ordered: Oxygen Pulsed oxygen portable tank Rate: 4LNC Diagnosis: ILD Duration: Lifetime -99    ILD (interstitial lung disease) (H)       potassium chloride ER 20 MEQ CR tablet    K-DUR/KLOR-CON M    270 tablet    Take 2 tabs (40 meq) in am and 1 tab (20 meq) in pm daily    (HFpEF) heart failure with preserved ejection fraction (H)       * predniSONE 10 MG tablet    DELTASONE    90 tablet    Take 2 tablets for three days (4/12-14), then take 1 tablet daily (10 mg daily starting 4/15)    ILD (interstitial lung disease) (H), Sjogren's syndrome with lung involvement (H)       * predniSONE 1 MG tablet    DELTASONE    120 tablet    Use with 5 mg tabs to taper: 10 mg and 9 mg every other day for 1m, 9 mg/day for 1m, 9 mg and 8 mg every other day for 1m, etc. until at 7mg    Primary Sjogren's syndrome (H)       * predniSONE 5 MG tablet    DELTASONE    30 tablet    Use with 1 mg tabs to taper: 10 mg and 9 mg every other day for 1m, 9 mg/day for 1m, 9 mg and 8 mg every other day for 1m, etc. until at 7mg    Primary Sjogren's syndrome (H)       spironolactone 25 MG tablet    ALDACTONE    180 tablet    Take 2 tablets (50 mg) by mouth daily    Chronic diastolic congestive heart failure (H)       tiZANidine 2 MG tablet    ZANAFLEX    90 tablet    Take 1-2 tablets (2-4 mg) by mouth 3 times daily    Right-sided low back pain with right-sided sciatica, unspecified chronicity       torsemide 20 MG tablet    DEMADEX    105 tablet    Take 40 mg in the morning, 30 mg in the afternoon.    (HFpEF) heart failure with preserved ejection fraction (H)       *  traZODone 50 MG tablet    DESYREL    180 tablet    TAKE 1/2-1 TABLET BY MOUTH NIGHTLY AS NEEDED, CAN TITRATE DOWN TO 1/2TAB OR UP TO 2TABS AS NEEDED    Insomnia due to medical condition       * traZODone 50 MG tablet    DESYREL    30 tablet    Take 0.5-1 tablets (25-50 mg) by mouth nightly as needed for sleep    Insomnia due to medical condition       TYLENOL 500 MG tablet   Generic drug:  acetaminophen      Take 500 mg by mouth nightly as needed    Dizziness       warfarin 7.5 MG tablet    COUMADIN    90 tablet    TAKE 1 TABLET BY MOUTH DAILY. CURRENT DOSE IS 7.5 MG DAILY. DOSE ADJUSTED PER INR RESULT.    Atrial fibrillation with controlled ventricular response (H)       * Notice:  This list has 15 medication(s) that are the same as other medications prescribed for you. Read the directions carefully, and ask your doctor or other care provider to review them with you.

## 2018-11-28 NOTE — PROGRESS NOTES
SUBJECTIVE:   Betty Tee is a 78 year old female who presents to clinic today for the following health issues:    Diabetes Follow-up - on metformin 1000mg/d, novolog w/ meals and basaglar 25 units nightly.  Patient is checking blood sugars: three times daily.   Blood sugar testing frequency justification: Risk of hypoglycemia with medication(s) and Patient modifying lifestyle changes (diet, exercise) with blood sugars  Results are as follows: pt brought readings list         am - 120's         lunchtime - 140's         suppertime - same range    Diabetic concerns: None     Symptoms of hypoglycemia (low blood sugar): none     Paresthesias (numbness or burning in feet) or sores: Yes stabbing pain at times     Date of last diabetic eye exam: -[facility]    Diabetes Management Resources    A1C down to 6.7 from 6.9 last month.  Diet- watching sweets, tries to prepare more meals.   Tries to eat three meals a day now, and gets good protein at breakfast (broussard and eggs).  Vegetables- not as great on that, though likes salad.  Otherwise, likes pickled beets, or corn.  Peas, sweet potatoes and squash.  Will try and increase vegetable intake.      Hyperlipidemia Follow-Up - on lipitor 10mg/d, last LDL 21.    Rate your low fat/cholesterol diet?: good    Taking statin?  Yes, possible muscle aches from statin-decreasing Prednisone    Other lipid medications/supplements?:  none      Hypertension Follow-up - BP under good control, on lisinopril 2.5mg/d, toprol XL 62.5mg 2x/day,     Outpatient blood pressures are not being checked.    Low Salt Diet: low salt      Concerns-   Groin area rash- it got better, but not gone, at low level.  Months of sx's.  Wondering about oral options or other options?      Pulmonary f/u-   Starting pulmonary rehab- M/W/Fr this next Monday.  She thinks they will hopefully address portable oxygen need there.  Reminded her to take her flovent regularly- trying to.  Energy is a bit better to start  projects, but has to stop and rest and takes longer to finish.    Rheum f/u- recommended going down to 7mg daily from alternating 7mg/8mg every other day.  Pt was having aches, so didn't want to go down, but will plan to do it this week.    Cardiology/a.fib- doing okay.    ANGELICA- has been trying to use her CPAP longer at night- now coming off at ~4am, 1am before.      BP Readings from Last 2 Encounters:   11/28/18 111/77   11/01/18 109/73     Hemoglobin A1C (%)   Date Value   10/02/2018 6.7 (H)   05/23/2018 6.9 (H)     LDL Cholesterol Calculated (mg/dL)   Date Value   05/23/2018 21   02/21/2018 5     Depression Followup    Status since last visit: Improved     See PHQ-9 for current symptoms.  Other associated symptoms: None    Complicating factors:   Significant life event:  No   Current substance abuse:  None  Anxiety or Panic symptoms:  No    PHQ 7/6/2018 8/17/2018 10/2/2018   PHQ-9 Total Score 8 3 4   Q9: Suicide Ideation Not at all Not at all Not at all     -  PHQ-9  English  PHQ-9   Any Language  Suicide Assessment Five-step Evaluation and Treatment (SAFE-T)    Amount of exercise or physical activity: tries to stay active each day    Problems taking medications regularly: No-friend Nghia sets up meds for pt    Medication side effects: unsure-poss muscle aches, may be from meds    Diet: low salt and low fat/cholesterol      Problem list and histories reviewed & adjusted, as indicated.  Additional history: as documented      Patient Active Problem List   Diagnosis     Temporomandibular joint disorder     Diverticulitis of colon     Disorder of bone and cartilage     Osteoarthritis     Alcohol abuse, in remission     Restless legs syndrome (RLS)     Major depressive disorder, recurrent episode, moderate (H)     Essential hypertension with goal blood pressure less than 140/90     Sciatica     Advanced directives, counseling/discussion     Colouterine fistula     Permanent atrial fibrillation (H)     Left ventricular  hypertrophy     Health Care Home     Insomnia     Joint pains     Allergic reaction caused by a drug - likely plaquenil     Breast fibroadenoma     DVT (deep venous thrombosis) (H)     Fatty liver disease, nonalcoholic     Rib pain     Neck mass     Gastroesophageal reflux disease without esophagitis     Enlarged lymph node     Sjogren's syndrome with lung involvement (H)     ANGELICA (obstructive sleep apnea)     ILD (interstitial lung disease) (H)     Lower GI bleed     Acute and chronic respiratory failure with hypoxia (H)     (HFpEF) heart failure with preserved ejection fraction (H)     Type 2 diabetes mellitus with hyperglycemia, with long-term current use of insulin (H)     Heart failure, chronic, with acute decompensation (H)     Hyperlipidemia LDL goal <100     Long term current use of anticoagulant therapy     Inflammatory arthritis     Follicular bronchiolitis (H)     Past Surgical History:   Procedure Laterality Date     BACK SURGERY  1962     BIOPSY BREAST  9/27/02    Biopsy Left Breast     C APPENDECTOMY  1970's?     C NONSPECIFIC PROCEDURE  11/05    exploratory abd lap, adhesions, resolved RLQ pain, diverticulitis episodes     CARDIAC SURGERY       CHOLECYSTECTOMY  1990's?     COLECTOMY LEFT  11/7/2011    Procedure:COLECTOMY LEFT; Laparoscopic mobilization of splenic flexture, sigmoid colectomy, coloprotoscopy, loop illeostomy; Surgeon:CK CASTANEDA; Location:[facility]     HYSTERECTOMY TOTAL ABDOMINAL, BILATERAL SALPINGO-OOPHORECTOMY, COMBINED  11/7/2011    Procedure:COMBINED HYSTERECTOMY TOTAL ABDOMINAL, BILATERAL SALPINGO-OOPHORECTOMY; total abdominal hysterectomy, bilateral salpingo-oophorectomy; Surgeon:ALETA MANUEL; Location:[facility]     INSERT STENT URETER  11/7/2011    Procedure:INSERT STENT URETER; Placement of Bilateral Ureteral Stents ; Surgeon:PRANEETH BRYANT; Location:[facility]     SIGMOIDOSCOPY FLEXIBLE  11/3/2011    Procedure:SIGMOIDOSCOPY FLEXIBLE; Flexible Sigmoidoscopy; Surgeon:CK CASTANEDA  NICO; Location:[facility]     TAKEDOWN ILEOSTOMY  2/1/2012    Procedure:TAKEDOWN ILEOSTOMY; Takedown Loop Ileostomy ; Surgeon:CK CASTANEDA; Location:[facility]       Social History   Substance Use Topics     Smoking status: Former Smoker     Packs/day: 0.50     Years: 10.00     Types: Cigarettes     Quit date: 8/1/2011     Smokeless tobacco: Never Used      Comment: 1/2 ppd     Alcohol use No      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     Family History   Problem Relation Age of Onset     C.A.D. Mother 63     MI- first at age 63     Heart Disease Mother      Hypertension Mother      Cerebrovascular Disease Mother      Hyperlipidemia Mother      Alcohol/Drug Father      Alzheimer Disease Father      Dementia Father      Hypertension Father      Hyperlipidemia Father      Diabetes Sister      C.A.D. Sister 52     Minor MI- age 50's     Heart Disease Sister      Hypertension Sister      Hypertension Sister      Hypertension Brother      Cancer - colorectal Sister 48     Late 40's early 50's     Prostate Cancer Brother 74     Dx'd age 74     GASTROINTESTINAL DISEASE Sister      Diverticulitis     GASTROINTESTINAL DISEASE Brother      Diverticulitis     Lipids Sister      Lipids Sister      Parkinsonism Brother      Diabetes Sister      Heart Disease Sister      CHF     Cancer Sister      lung, smoker     Substance Abuse Sister      Substance Abuse Brother      Asthma Sister      Cancer Sister      Breast Cancer Daughter      Prostate Cancer Brother      Hyperlipidemia Brother      Diabetes Other      Hypertension Other          Current Outpatient Prescriptions   Medication Sig Dispense Refill     ACCU-CHEK SHANNON PLUS test strip USE TO TEST BLOOD SUGARS 4 TIMES DAILY OR AS DIRECTED 400 strip 0     acetaminophen (TYLENOL) 500 MG tablet Take 500 mg by mouth nightly as needed        acyclovir (ZOVIRAX) 400 MG tablet Take 1 tablet (400 mg) by mouth 3 times daily For a couple days 15 tablet 2     acyclovir (ZOVIRAX) 5 % external  ointment Apply topically 6 times daily As needed for outbreaks 15 g 3     albuterol (PROAIR HFA, PROVENTIL HFA, VENTOLIN HFA) 108 (90 BASE) MCG/ACT inhaler Inhale 2 puffs into the lungs every 6 hours 1 Inhaler 3     atorvastatin (LIPITOR) 10 MG tablet TAKE 1 TABLET (10 MG) BY MOUTH DAILY 90 tablet 0     BASAGLAR 100 UNIT/ML injection Inject 25 Units Subcutaneous daily (to replace lantus) 15 mL 1     BD JANE U/F 32G X 4 MM insulin pen needle USE 4 DAILY AS DIRECTED. 400 each 0     blood glucose monitoring (ACCU-CHEK SHANNON PLUS) test strip Use to test blood sugar 4 times daily or as directed.  Ok to substitute alternative if insurance prefers. 120 strip 11     blood glucose monitoring (ACCU-CHEK FASTCLIX) lancets Use to test blood sugar 4 times daily or as directed.  Ok to substitute alternative if insurance prefers. 1 Box 11     blood glucose monitoring (ACCU-CHEK MULTICLIX) lancets Use to test blood sugar 4 times daily or as directed. 4 Box 3     COMPRESSION STOCKINGS Wear compression stockings in affected leg (right leg) or both legs most of the time during the day and take them off at night. 2 each 2     escitalopram (LEXAPRO) 20 MG tablet TAKE 1 TABLET (20 MG) BY MOUTH DAILY 90 tablet 1     fluticasone (FLONASE) 50 MCG/ACT nasal spray Spray 1-2 sprays into both nostrils daily as needed for allergies 1 Bottle 3     fluticasone (FLOVENT HFA) 220 MCG/ACT inhaler Inhale 2 puffs into the lungs 2 times daily 3 Inhaler 3     Humidifier MISC Continuous humidified air via concentrator.   Diagnosis: ILD  Duration: Lifetime 99. 1 each 1     insulin pen needle (B-D U/F) 31G X 8 MM Use 4 times daily (with Lantus and Novolog) or as directed. 400 each 3     ketoconazole (NIZORAL) 2 % external cream Apply topically 2 times daily 60 g 1     lisinopril (PRINIVIL/ZESTRIL) 2.5 MG tablet Take 1 tablet (2.5 mg) by mouth daily 90 tablet 2     metFORMIN (GLUCOPHAGE-XR) 500 MG 24 hr tablet TAKE 2 TABLETS BY MOUTH DAILY WITH DINNER 180  tablet 0     metoprolol succinate (TOPROL XL) 25 MG 24 hr tablet Take 0.5 tablets (12.5 mg) by mouth 2 times daily Take 50 mg by mouth in combination with 12.5 for a total of 62.5 twice a day 90 tablet 3     metoprolol succinate (TOPROL-XL) 100 MG 24 hr tablet Take 50 mg by mouth in combination with 12.5 for a total of 62.5 twice a day 90 tablet 3     montelukast (SINGULAIR) 10 MG tablet TAKE 1 TABLET BY MOUTH EVERYDAY AT BEDTIME 90 tablet 0     NOVOLOG FLEXPEN 100 UNIT/ML soln INJECT 12 UNITS SUBCUTANEOUS 3 TIMES DAILY (WITH MEALS) 15 mL 1     order for DME Equipment being ordered: Oxygen  Pulsed oxygen portable tank  Rate: 4LNC  Diagnosis: ILD  Duration: Lifetime -99 1 Device 1     order for DME Equipment being ordered: Full face mask for CPAP - size: F10 large 1 each 0     order for DME Oxygen: Patient requires supplemental Oxygen 4 LPM via nasal canula with activity. Please provide patient with a home unit and with portability capability. Oxygen will be for a lifetime. 1 Device 0     potassium chloride SA (K-DUR/KLOR-CON M) 20 MEQ CR tablet Take 2 tabs (40 meq) in am and 1 tab (20 meq) in pm daily 270 tablet 3     predniSONE (DELTASONE) 1 MG tablet Use with 5 mg tabs to taper: 10 mg and 9 mg every other day for 1m, 9 mg/day for 1m, 9 mg and 8 mg every other day for 1m, etc. until at 7mg 120 tablet 6     predniSONE (DELTASONE) 5 MG tablet Use with 1 mg tabs to taper: 10 mg and 9 mg every other day for 1m, 9 mg/day for 1m, 9 mg and 8 mg every other day for 1m, etc. until at 7mg 30 tablet 6     spironolactone (ALDACTONE) 25 MG tablet Take 2 tablets (50 mg) by mouth daily 180 tablet 3     tiZANidine (ZANAFLEX) 2 MG tablet Take 1-2 tablets (2-4 mg) by mouth 3 times daily 90 tablet 1     torsemide (DEMADEX) 20 MG tablet Take 40 mg in the morning, 30 mg in the afternoon. 105 tablet 3     traZODone (DESYREL) 50 MG tablet Take 0.5-1 tablets (25-50 mg) by mouth nightly as needed for sleep 30 tablet 5     warfarin  (COUMADIN) 7.5 MG tablet TAKE 1 TABLET BY MOUTH DAILY. CURRENT DOSE IS 7.5 MG DAILY. DOSE ADJUSTED PER INR RESULT. 90 tablet 3     alendronate (FOSAMAX) 70 MG tablet Take 1 tablet (70 mg) by mouth every 7 days (Patient not taking: Reported on 10/22/2018) 4 tablet 11     gabapentin (NEURONTIN) 300 MG capsule Take 1 capsule (300 mg) by mouth At Bedtime (Patient not taking: Reported on 10/22/2018) 30 capsule 0     lidocaine (LIDODERM) 5 % Patch Apply patch to painful area for up to 12 h within a 24 h period.  Remove after 12 hours. (Patient not taking: Reported on 10/22/2018) 30 patch 0     Allergies   Allergen Reactions     Augmentin Nausea and Vomiting     Codeine Nausea and Vomiting     Phenobarbital Itching     Seasonal Allergies      Recent Labs   Lab Test  10/22/18   1246  10/02/18   1524  05/23/18   1028   02/21/18   1230   01/31/18   1428  11/08/17   1432   10/06/17   1421   08/01/17   1146   06/22/17   1700   A1C   --   6.7*  6.9*   --    --    --   7.0*   --    --   7.3*   --    --    < >   --    LDL   --    --   21   --   5   --    --    --    --    --    --    --    --   13   HDL   --    --   53   --   64   --    --    --    --    --    --    --    --   62   TRIG   --    --   113   --   188*   --    --    --    --    --    --    --    --   132   ALT   --    --   21   --    --    --    --    --    --   43   --   45   --    --    CR  0.97   --   0.90   < >  0.88   < >  0.96   --    < >  0.94   < >  0.90   < >  0.80   GFRESTIMATED  55*   --   61   < >  62   < >  56*   --    < >  58*   < >  61   < >  69   GFRESTBLACK  67   --   74   < >  76   < >  68   --    < >  70   < >  74   < >  84   POTASSIUM  4.2   --   3.5   < >  3.9   < >  4.5   --    < >  4.3   < >  3.5   < >  3.0*   TSH   --    --   2.42   --    --    --    --   1.40   --    --    --    --    --    --     < > = values in this interval not displayed.      BP Readings from Last 3 Encounters:   11/28/18 111/77   11/01/18 109/73   10/22/18 110/80    Wt  "Readings from Last 3 Encounters:   11/28/18 211 lb (95.7 kg)   11/01/18 211 lb (95.7 kg)   10/22/18 211 lb 9.6 oz (96 kg)            Labs reviewed in EPIC    Reviewed and updated as needed this visit by clinical staff  Tobacco  Allergies  Meds  Problems  Med Hx  Surg Hx  Fam Hx  Soc Hx        Reviewed and updated as needed this visit by Provider  Allergies  Meds  Problems         ROS:  Constitutional, HEENT, cardiovascular, pulmonary, gi and gu systems are negative, except as otherwise noted.    OBJECTIVE:     /77  Pulse 71  Temp 98  F (36.7  C) (Oral)  Resp 14  Ht 5' 7\" (1.702 m)  Wt 211 lb (95.7 kg)  SpO2 92%  Breastfeeding? No  BMI 33.05 kg/m2  Body mass index is 33.05 kg/(m^2).  GENERAL APPEARANCE: healthy, alert and no distress     EYES: PERRL, sclera clear     HENT: nose and mouth without ulcers or lesions     NECK: no adenopathy, no asymmetry, masses, or scars and thyroid normal to palpation     RESP: lungs clear to auscultation - no rales, rhonchi or wheezes     CV: regular rates and rhythm, normal S1 S2, no S3 or S4 and no murmur, click or rub      Abdomen: soft, nontender, no HSM or masses and bowel sounds normal     Ext: warm, dry, no edema      Psych: full range affect, normal speech and grooming, judgement and insight intact       ASSESSMENT/PLAN:     (E11.65,  Z79.4) Type 2 diabetes mellitus with hyperglycemia, with long-term current use of insulin (H)  (primary encounter diagnosis)  Comment: A1C in good control, though up from 6.7 to 6.9.  Will cont current meds/insulin, and she'll continue to work on diet- specifically getting more vegetables (also recommended by rheumatology recent visit).  She'll also likely be getting more exercise soon with pulmonary rehab starting next week.  Plan: Cont current meds/insulin, work on diet/exercise, and f/u in 3 months.    (E78.5) Hyperlipidemia LDL goal <100  Comment:   Plan: Cont lipitor 10mg/d, though talk with cardiology if the LDL is " too low.    (I10) Essential hypertension with goal blood pressure less than 140/90  Comment: Discussed slightly high urine albumin (29) and slightly lower GFR (55), and if these are persistent, may be a sign of chronic kidney disease.  Discussed the best txt for this is good control of BP and DM, and being on an ACE/ARB - she's on lisinopril 2.5mg/d, and BP is pretty low to increase the dose.  Plan: Will continue to monitor, cont current meds, and will cont good control of DM, BP and continue lisinopril 2.5mg/d.     (B37.2) Cutaneous candidiasis  Comment: Improved sx's, but not resolved, likely due to persistent dampness of skin folds on each other.  Difficult to keep the area dry, especially with the cream.   Discussed possibility of trying a zinc barrier cream at night to see if that helps heal the skin further.      Plan: ketoconazole (NIZORAL) 2 % external cream          (J42) Follicular bronchiolitis (H)/(J84.9) ILD (interstitial lung disease) (H)  Comment: Cont f/u with pulmonary, will send in flovent rx for now for ease, but further refills should likely come from pulmonary as they are directing this care.  Plan: fluticasone (FLOVENT HFA) 220 MCG/ACT inhaler       (I48.2) Permanent atrial fibrillation (H)  Comment:   Plan: Will get INR here today.  Cont f/u with cardiology.    (M19.90) Inflammatory arthritis  Comment:   Plan: Cont f/u with rheumatology.    (B00.1) Recurrent cold sores  Comment: Cont current meds as needed.  Plan: acyclovir (ZOVIRAX) 5 % external ointment,         acyclovir (ZOVIRAX) 400 MG tablet          (G47.01) Insomnia due to medical condition  Comment: lowering trazodone dose to 1.2 tab (25mg) has actually worked a bit better for her lately- unsure why, but will continue for now.  Refills sent.  Plan: traZODone (DESYREL) 50 MG tablet          (J30.1) Seasonal allergic rhinitis due to pollen  Comment:   Plan: fluticasone (FLONASE) 50 MCG/ACT nasal spray          Return in about 3 months  (around 2/28/2019) for 45 min appt- Diabetes, Depression follow-up, BP Recheck.  Will likely recheck A1C, possibly urine albumin.      Ilene Tristan MD  [facility]      Spent greater than 50% of 40 minutes in face to face time counseling patient and coordination of care regarding multiple issues as outlined above.

## 2018-11-28 NOTE — TELEPHONE ENCOUNTER
ANTICOAGULATION FOLLOW-UP CLINIC VISIT    Patient Name:  Betty Tee  Date:  11/28/2018  Contact Type:  Telephone    SUBJECTIVE:  Bleeding Signs/Symptoms: None  Thromboembolic Signs/Symptoms: None    Medication Changes:  No  Dietary Changes:  No  Bacterial/Viral Infection:  No    Missed Coumadin Doses:  None  Other Concerns:  No      ASSESSMENT/PLAN:  See: ANTICOAGULATION QIC flow sheet.    INR 2.5  Continue 7.5mg daily  Recheck 4 weeks  Left detailed VM for pt  Lydia HALEY RN    Dosage adjustment made based on physician directed care plan.    JIMI VOGT

## 2018-12-01 DIAGNOSIS — E11.9 TYPE 2 DIABETES MELLITUS WITHOUT COMPLICATION, WITHOUT LONG-TERM CURRENT USE OF INSULIN (H): ICD-10-CM

## 2018-12-03 RX ORDER — PEN NEEDLE, DIABETIC 32GX 5/32"
NEEDLE, DISPOSABLE MISCELLANEOUS
Qty: 400 EACH | Refills: 1 | Status: SHIPPED | OUTPATIENT
Start: 2018-12-03 | End: 2019-06-10

## 2018-12-03 NOTE — TELEPHONE ENCOUNTER
"Requested Prescriptions   Pending Prescriptions Disp Refills     BD JANE U/F 32G X 4 MM insulin pen needle [Pharmacy Med Name: BD UF JANE PEN NEEDLE [id number]]  0     Sig: USE 4 DAILY AS DIRECTED.    Diabetic Supplies Protocol Passed    12/1/2018  4:45 PM       Passed - Patient is 18 years of age or older       Passed - Recent (6 mo) or future (30 days) visit within the authorizing provider's specialty    Patient had office visit in the last 6 months or has a visit in the next 30 days with authorizing provider.  See \"Patient Info\" tab in inbasket, or \"Choose Columns\" in Meds & Orders section of the refill encounter.            "

## 2018-12-03 NOTE — TELEPHONE ENCOUNTER
Prescription approved per [facility] Refill Protocol.  Lydia HALEY RN

## 2018-12-05 ENCOUNTER — TRANSFERRED RECORDS (OUTPATIENT)
Dept: HEALTH INFORMATION MANAGEMENT | Facility: CLINIC | Age: 78
End: 2018-12-05

## 2018-12-06 ENCOUNTER — OFFICE VISIT (OUTPATIENT)
Dept: RHEUMATOLOGY | Facility: CLINIC | Age: 78
End: 2018-12-06
Attending: INTERNAL MEDICINE
Payer: MEDICARE

## 2018-12-06 VITALS
TEMPERATURE: 98.2 F | SYSTOLIC BLOOD PRESSURE: 112 MMHG | BODY MASS INDEX: 33.89 KG/M2 | DIASTOLIC BLOOD PRESSURE: 68 MMHG | OXYGEN SATURATION: 96 % | WEIGHT: 215.9 LBS | HEIGHT: 67 IN | HEART RATE: 82 BPM

## 2018-12-06 DIAGNOSIS — M25.50 ARTHRALGIA, UNSPECIFIED JOINT: ICD-10-CM

## 2018-12-06 DIAGNOSIS — M35.02 SJOGREN'S SYNDROME WITH LUNG INVOLVEMENT (H): Primary | ICD-10-CM

## 2018-12-06 DIAGNOSIS — J21.9 BRONCHIOLITIS: ICD-10-CM

## 2018-12-06 PROCEDURE — G0463 HOSPITAL OUTPT CLINIC VISIT: HCPCS | Mod: ZF

## 2018-12-06 ASSESSMENT — PAIN SCALES - GENERAL: PAINLEVEL: NO PAIN (0)

## 2018-12-06 NOTE — PROGRESS NOTES
[facility]  Rheumatology Clinic  Carmenza Stringer MD  2018     Name: Betty Tee  MRN: [id number]  Age: 78 year old  : 1940  Referring provider: Referred Self     # Sjogren's syndrome since  with borderline +RG, +SSA, and lip biopsy focus score of 1.  # prednisone-responsive polyarthritis  # Follicular bronchiolitis, prior mild ILD  # Osteoporosis  # Chronic prednisone use  # DM  # A fib     Assessment and Plan:  Primary Sjogren's syndrome with ILD (H)  Sister Sita returns with stable PFTs and improvement on most recent chest CT showing bronchiolitis, but no ILD. She just lowered her prednisone to 7mg daily and is tolerating this fairly with mild joint aches in the knees and hips. Plan to stay on 7mg prednisone for 1-3 months, complete pulmonary rehab. If stable at that time, we will consider lowering prednisone further, alternating 6mg/7mg daily for one month, then 6mg for one month, then 6mg/5mg for one month, and then 5mg for one month. She has already noticed improvement in blood sugar with lower doses. For mouth dryness she can biotin spray in addition to mouth washing for dryness during the day. Return in 3 months.       Follow-up: Return in 3 months.     Subjective:  Betty Tee is a 78 year old female with a history of primary Sjogren's syndrome who presents for follow-up. She was last seen on 18 at which time she reported increased shortness of breath and had to increase her oxygen levels at home. Plan at that time was to begin a slow prednisone taper (9mg for one day then 10mg the next day for one month prior to going down to 9mg for one month, etc) until she is at 7mg daily. She also planned on resuming Fosamax. She last saw Dr. Tillman on 18 who recommended she proceed with pulmonary rehab.     Today, she reports that she began her pulmonary rehab last Monday and has been working on breathing techniques. She successfully tapered to 7mg prednisone this past week. On  "this dosage, she reports having \"low grade\" pain in her knees and hips. She also notes increased frequency of dizziness, but this is not too concerning for her. She endorses increased symptoms of dryness in her throat. Has mild symptoms of heartburn which she feels is unrelated to her symptoms of throat dryness. She has no other concerns today.     HPI:   Betty Tee is a 78 year old female with a history of primary Sjogren's syndrome who presents for follow-up. She was diagnosed with Sjogren's syndrome in 2015 with borderline +RG, +SSA, and lip biopsy focus score of 1. Associated ILD and joint pain are prednisone-responsive which support the diagnosis. Ocular staining score has been deferred given other sources of diagnosis.    Interval history: 6/7/18   Today, the patient states she is feeling short of breath as she did not bring her oxygen tank with her. She states she has been feeling a bit more short of breath at home while going up and down her basement steps recently. She last saw her pulmonologist, Dr. Walsh, on 1/10/2018 and does not have recent pulmonary function tests. She has not needed to increase her oxygen levels at home, noting she has always used 4 liters, which seems to be sufficient for her. When she is out of the house she frequently leaves her oxygen tank at home. She does currently have an irritating, non-productive cough that began approximately one week ago with associated postnasal drip. She has been experiencing night sweats and infrequent chills this week but denies any measured fevers.     She denies any joint pains today, but does state she had a flare of joint pain, specifically in her right knee, a few weeks ago. The pain hit very intensely prior to resolving.      With regards to starting rituximab, she voices she discussed this with her primary care provider who does not feel this is a good idea.     Interval history: 3/8/18  Patient reported doing rather well but did mention " some trouble with intermittent arthralgias. We discussed consideration of rituximab given problems stemming from long-term prednisone use and she elected to think about this option. Plan was made for continuation of 10mg of prednisone daily for the time being. Recommended starting Fosamax, 70mg, once per week, however she was hesitant about side effects.      Review of Systems:   Pertinent items are noted in HPI or as below, remainder of complete ROS is negative.      No recent problems with hearing or vision. No swallowing problems.   No breathing difficulty, shortness of breath, coughing, or wheezing  No chest pain or palpitations  No heart burn, indigestion, abdominal pain, nausea, vomiting, diarrhea  No urination problems, no bloody, cloudy urine, no dysuria  No numbing, tingling, weakness  No headaches or confusion  No rashes. No easy bleeding or bruising.     Active Medications:     Current Outpatient Prescriptions:      ACCU-CHEK SHANNON PLUS test strip, USE TO TEST BLOOD SUGARS 4 TIMES DAILY OR AS DIRECTED, Disp: 400 strip, Rfl: 0     acetaminophen (TYLENOL) 500 MG tablet, Take 500 mg by mouth nightly as needed , Disp: , Rfl:      acyclovir (ZOVIRAX) 400 MG tablet, Take 1 tablet (400 mg) by mouth 3 times daily For a couple days, Disp: 15 tablet, Rfl: 2     acyclovir (ZOVIRAX) 5 % external ointment, Apply topically 6 times daily As needed for outbreaks, Disp: 15 g, Rfl: 3     albuterol (PROAIR HFA, PROVENTIL HFA, VENTOLIN HFA) 108 (90 BASE) MCG/ACT inhaler, Inhale 2 puffs into the lungs every 6 hours, Disp: 1 Inhaler, Rfl: 3     alendronate (FOSAMAX) 70 MG tablet, Take 1 tablet (70 mg) by mouth every 7 days (Patient not taking: Reported on 10/22/2018), Disp: 4 tablet, Rfl: 11     atorvastatin (LIPITOR) 10 MG tablet, TAKE 1 TABLET (10 MG) BY MOUTH DAILY, Disp: 90 tablet, Rfl: 0     BASAGLAR 100 UNIT/ML injection, Inject 25 Units Subcutaneous daily (to replace lantus), Disp: 15 mL, Rfl: 1     BD JANE U/F 32G X 4  MM insulin pen needle, USE 4 DAILY AS DIRECTED., Disp: 400 each, Rfl: 1     blood glucose monitoring (ACCU-CHEK SHANNON PLUS) test strip, Use to test blood sugar 4 times daily or as directed.  Ok to substitute alternative if insurance prefers., Disp: 120 strip, Rfl: 11     blood glucose monitoring (ACCU-CHEK FASTCLIX) lancets, Use to test blood sugar 4 times daily or as directed.  Ok to substitute alternative if insurance prefers., Disp: 1 Box, Rfl: 11     blood glucose monitoring (ACCU-CHEK MULTICLIX) lancets, Use to test blood sugar 4 times daily or as directed., Disp: 4 Box, Rfl: 3     COMPRESSION STOCKINGS, Wear compression stockings in affected leg (right leg) or both legs most of the time during the day and take them off at night., Disp: 2 each, Rfl: 2     escitalopram (LEXAPRO) 20 MG tablet, TAKE 1 TABLET (20 MG) BY MOUTH DAILY, Disp: 90 tablet, Rfl: 1     fluticasone (FLONASE) 50 MCG/ACT nasal spray, Spray 1-2 sprays into both nostrils daily as needed for allergies, Disp: 1 Bottle, Rfl: 3     fluticasone (FLOVENT HFA) 220 MCG/ACT inhaler, Inhale 2 puffs into the lungs 2 times daily, Disp: 3 Inhaler, Rfl: 3     gabapentin (NEURONTIN) 300 MG capsule, Take 1 capsule (300 mg) by mouth At Bedtime (Patient not taking: Reported on 10/22/2018), Disp: 30 capsule, Rfl: 0     Humidifier MISC, Continuous humidified air via concentrator.  Diagnosis: ILD Duration: Lifetime 99., Disp: 1 each, Rfl: 1     insulin pen needle (B-D U/F) 31G X 8 MM, Use 4 times daily (with Lantus and Novolog) or as directed., Disp: 400 each, Rfl: 3     ketoconazole (NIZORAL) 2 % external cream, Apply topically 2 times daily, Disp: 60 g, Rfl: 1     lidocaine (LIDODERM) 5 % Patch, Apply patch to painful area for up to 12 h within a 24 h period.  Remove after 12 hours. (Patient not taking: Reported on 10/22/2018), Disp: 30 patch, Rfl: 0     lisinopril (PRINIVIL/ZESTRIL) 2.5 MG tablet, Take 1 tablet (2.5 mg) by mouth daily, Disp: 90 tablet, Rfl: 2      metFORMIN (GLUCOPHAGE-XR) 500 MG 24 hr tablet, TAKE 2 TABLETS BY MOUTH DAILY WITH DINNER, Disp: 180 tablet, Rfl: 0     metoprolol succinate (TOPROL XL) 25 MG 24 hr tablet, Take 0.5 tablets (12.5 mg) by mouth 2 times daily Take 50 mg by mouth in combination with 12.5 for a total of 62.5 twice a day, Disp: 90 tablet, Rfl: 3     metoprolol succinate (TOPROL-XL) 100 MG 24 hr tablet, Take 50 mg by mouth in combination with 12.5 for a total of 62.5 twice a day, Disp: 90 tablet, Rfl: 3     montelukast (SINGULAIR) 10 MG tablet, TAKE 1 TABLET BY MOUTH EVERYDAY AT BEDTIME, Disp: 90 tablet, Rfl: 0     NOVOLOG FLEXPEN 100 UNIT/ML soln, INJECT 12 UNITS SUBCUTANEOUS 3 TIMES DAILY (WITH MEALS), Disp: 15 mL, Rfl: 1     order for DME, Equipment being ordered: Oxygen Pulsed oxygen portable tank Rate: 4LNC Diagnosis: ILD Duration: Lifetime -99, Disp: 1 Device, Rfl: 1     order for DME, Equipment being ordered: Full face mask for CPAP - size: F10 large, Disp: 1 each, Rfl: 0     order for DME, Oxygen: Patient requires supplemental Oxygen 4 LPM via nasal canula with activity. Please provide patient with a home unit and with portability capability. Oxygen will be for a lifetime., Disp: 1 Device, Rfl: 0     potassium chloride SA (K-DUR/KLOR-CON M) 20 MEQ CR tablet, Take 2 tabs (40 meq) in am and 1 tab (20 meq) in pm daily, Disp: 270 tablet, Rfl: 3     predniSONE (DELTASONE) 1 MG tablet, Use with 5 mg tabs to taper: 10 mg and 9 mg every other day for 1m, 9 mg/day for 1m, 9 mg and 8 mg every other day for 1m, etc. until at 7mg, Disp: 120 tablet, Rfl: 6     predniSONE (DELTASONE) 5 MG tablet, Use with 1 mg tabs to taper: 10 mg and 9 mg every other day for 1m, 9 mg/day for 1m, 9 mg and 8 mg every other day for 1m, etc. until at 7mg, Disp: 30 tablet, Rfl: 6     spironolactone (ALDACTONE) 25 MG tablet, Take 2 tablets (50 mg) by mouth daily, Disp: 180 tablet, Rfl: 3     tiZANidine (ZANAFLEX) 2 MG tablet, Take 1-2 tablets (2-4 mg) by mouth 3 times  daily, Disp: 90 tablet, Rfl: 1     torsemide (DEMADEX) 20 MG tablet, Take 40 mg in the morning, 30 mg in the afternoon., Disp: 105 tablet, Rfl: 3     traZODone (DESYREL) 50 MG tablet, Take 0.5-1 tablets (25-50 mg) by mouth nightly as needed for sleep, Disp: 30 tablet, Rfl: 5     warfarin (COUMADIN) 7.5 MG tablet, TAKE 1 TABLET BY MOUTH DAILY. CURRENT DOSE IS 7.5 MG DAILY. DOSE ADJUSTED PER INR RESULT., Disp: 90 tablet, Rfl: 3      Allergies:   Augmentin\  Codeine  Phenobarbital  Seasonal allergies      Past Medical History:  Alcohol abuse, in remission.   Allergic rhinitis.   Antiplatelet long-term use.   Atrial fibrillation.   Cardiomegaly.   Osteopenia.   Diverticulosis.   Benign essential hypertension.   GERD.   Insomnia.   Irregular heart beat.   Lumbago.   Major depressive disorder, recurrent episode, moderate.   ANGELICA.  Osteoarthrosis.   Sjogren's syndrome.   Sleep apnea.   Tobacco use disorder.   TMJ disorder.   RLS.  Major depressive disorder.   Hypertension.   Sciatica.   Colouterine fistula.   Left ventricular hypertrophy.   Breat fibroadenoma.   DVT.   Fatty liver disease, nonalcoholic.   Rib pain.   Neck mass.   Interstitial lung disease.   Acute and chronic respiratory failure with hypoxia.   Type II diabetes mellitus.   Hyperlipidemia.   Inflammatory arthritis.      Past Surgical History:  Back surgery.   Left breast biopsy.   Appendectomy.   Exploratory laparotomy.   Cardiac surgery.   Cholecystectomy.   Left colectomy.   Total abdominal hysterectomy with bilateral salpingo-oophorectomy.   Insert ureter stent.   Flexible sigmoidoscopy.   Ileostomy takedown.     Family History:   Mother: Positive for CAD, heart disease, hypertension, cerebrovascular disease, hyperlipidemia.   Father: Positive for alcohol/drug abuse, Alzheimer disease, dementia, hypertension, hyperlipidemia.   Sister: Positive for CAD, hypertension, and colorectal cancer.   Sister: Positive for hypertension and hyperlipidemia.   Sister:  "Positive for diabetes, lung cancer, asthma, and heart disease.   Brother: Positive for Parkinsonism and substance abuse.   Brother: Positive for hypertension, diverticulitis, and prostate cancer.   Daughter: Positive for breast cancer.        Social History:   She is a former smoker; quit in 2011. She has a history of alcohol abuse but stopped drinking in 1986.      Physical Exam:   /68  Pulse 82  Temp 98.2  F (36.8  C) (Oral)  Ht 1.702 m (5' 7\")  Wt 97.9 kg (215 lb 14.4 oz)  SpO2 96%  BMI 33.81 kg/m2   Wt Readings from Last 4 Encounters:   12/06/18 97.9 kg (215 lb 14.4 oz)   11/28/18 95.7 kg (211 lb)   11/01/18 95.7 kg (211 lb)   10/22/18 96 kg (211 lb 9.6 oz)     Constitutional: Well-developed, appearing stated age; cooperative  Eyes: Normal EOM, PERRLA, vision, conjunctiva, sclera  ENT: Normal external ears, nose, hearing, lips, teeth, gums, throat. No mucous membrane lesions, normal saliva pool  Neck: No mass or thyroid enlargement  Resp: Good air movement, + mild scattered wheeze in the upper lobes BL  CV: no murmurs, rubs or gallops, no edema. Irregular rate, normal rhythm.   GI: No ABD mass or tenderness, no HSM  : Not tested  Lymph: No cervical, supraclavicular, inguinal or epitrochlear nodes  MS: The TMJ, neck, shoulder, elbow, wrist, MCP/PIP/DIP, spine, hip, knee, ankle, and foot MTP/IP joints were examined and found normal. No active synovitis or altered joint anatomy. Full joint ROM. Normal  strength. No dactylitis,  tenosynovitis, enthesopathy.  Skin: No nail pitting, alopecia, rash, nodules or lesions  Neuro: grossly non-focal  Psych: Normal judgement, orientation, memory, affect.    Images:  CT Chest w/o contrast (7/25/18):  Findings remain most consistent with small airway disease  and/or nonclassifiable interstitial lung disease and are not  significantly changed from the March 2017 comparison.    Per radiology.    Laboratory:   RHEUM RESULTS Latest Ref Rng & Units 4/18/2018 " 5/23/2018 10/22/2018   SED RATE 0 - 30 mm/h - - -   CRP, INFLAMMATION 0.0 - 8.0 mg/L - - -   CK TOTAL 30 - 225 U/L - - -   RHEUMATOID FACTOR <20 IU/mL - - -   RG SCREEN BY EIA <1.0 - - -   AST 0 - 45 U/L - 15 -   ALT 0 - 50 U/L - 21 -   ALBUMIN 3.4 - 5.0 g/dL - 3.3(L) -   WBC 4.0 - 11.0 10e9/L - - -   RBC 3.8 - 5.2 10e12/L - - -   HGB 11.7 - 15.7 g/dL - - -   HCT 35.0 - 47.0 % - - -   MCV 78 - 100 fl - - -   MCHC 31.5 - 36.5 g/dL - - -   RDW 10.0 - 15.0 % - - -    - 450 10e9/L - - -   CREATININE 0.52 - 1.04 mg/dL 0.90 0.90 0.97   GFR ESTIMATE, IF BLACK >60 mL/min/1.7m2 73 74 67   GFR ESTIMATE >60 mL/min/1.7m2 60(L) 61 55(L)    - 1620 mg/dL - - -       Rheumatoid Factor   Date Value Ref Range Status   07/24/2015 <20 <20 IU/mL Final   ,  ,   Cyclic Cit Pept IgG/IgA   Date Value Ref Range Status   07/24/2015 <20  Interpretation:  Negative   <20 UNITS Final   ,  ,   Scleroderma Antibody Scl-70 CECILIA IgG   Date Value Ref Range Status   07/24/2015  0.0 - 0.9 AI Final    <0.2  Negative   Antibody index (AI) values reflect qualitative changes in antibody   concentration that cannot be directly associated with clinical condition or   disease state.       SSA (Ro) (CECILIA) Antibody, IgG   Date Value Ref Range Status   07/24/2015 1.6 (H) 0.0 - 0.9 AI Final     Comment:     Positive   Antibody index (AI) values reflect qualitative changes in antibody   concentration that cannot be directly associated with clinical condition or   disease state.       SSB (La) (CECILIA) Antibody, IgG   Date Value Ref Range Status   07/24/2015  0.0 - 0.9 AI Final    <0.2  Negative   Antibody index (AI) values reflect qualitative changes in antibody   concentration that cannot be directly associated with clinical condition or   disease state.       ,  ,   RG Screen by EIA   Date Value Ref Range Status   07/24/2015 <1.0  Interpretation:  Negative   <1.0 Final   ,  ,  ,  ,  ,  ,  ,  ,  ,  ,  ,  ,  ,  ,  ,  ,  ,  ,   Neutrophil Cytoplasmic IgG  Antibody   Date Value Ref Range Status   07/24/2015   Final    <1:20  Reference range: <1:20  (Note)  The ANCA IFA is <1:20; therefore, no further testing will  be performed.  INTERPRETIVE INFORMATION: Anti-Neutrophil Cyto Ab, IgG  Neutrophil Cytoplasmic Antibodies (C-ANCA = granular  cytoplasmic staining, P-ANCA = perinuclear staining) are  found in the serum of over 90 percent of patients with  certain necrotizing systemic vasculitides, and usually in  less than 5 percent of patients with collagen vascular  disease or arthritis.  Performed by [facility],  [address] [place] [id number] [phone]  [email or web address], Burke Mckeon MD, Lab. Director       ,  ,  ,  ,  ,  ,  ,   IGG   Date Value Ref Range Status   07/24/2015 1320 695 - 1620 mg/dL Final   ,  ,  ,  ,  ,   Scleroderma Antibody Scl-70 CECILIA IgG   Date Value Ref Range Status   07/24/2015  0.0 - 0.9 AI Final    <0.2  Negative   Antibody index (AI) values reflect qualitative changes in antibody   concentration that cannot be directly associated with clinical condition or   disease state.       ,  ,             Scribe Disclosure:   I, Colton Norris, am serving as a scribe to document services personally performed by Carmenza Stringer MD at this visit, based upon the provider's statements to me. All documentation has been reviewed by the aforementioned provider prior to being entered into the official medical record.     Portions of this medical record were completed by a scribe. UPON MY REVIEW AND AUTHENTICATION BY ELECTRONIC SIGNATURE, this confirms (a) I performed the applicable clinical services, and (b) the record is accurate.    Attending Attestation:    I reviewed and edited the above scribed note to reflect my findings and plan.     I discussed the findings and recommendations with the patient.  Recommendations were discussed with the consulting team.  Time spent: 30 minutes    Carmenza Stringer MD, MS  Rheumatology Attending Physician  pgr  [phone]

## 2018-12-06 NOTE — LETTER
2018      RE: Betty Tee  [address]  [facility] [address]       [facility]  Rheumatology Clinic  Carmenza Stringer MD  2018     Name: Betty Tee  MRN: [id number]  Age: 78 year old  : 1940  Referring provider: Referred Self     # Sjogren's syndrome since  with borderline +RG, +SSA, and lip biopsy focus score of 1.  # prednisone-responsive polyarthritis  # Follicular bronchiolitis, prior mild ILD  # Osteoporosis  # Chronic prednisone use  # DM  # A fib     Assessment and Plan:  Primary Sjogren's syndrome with ILD (H)  Sister Sita returns with stable PFTs and improvement on most recent chest CT showing bronchiolitis, but no ILD. She just lowered her prednisone to 7mg daily and is tolerating this fairly with mild joint aches in the knees and hips. Plan to stay on 7mg prednisone for 1-3 months, complete pulmonary rehab. If stable at that time, we will consider lowering prednisone further, alternating 6mg/7mg daily for one month, then 6mg for one month, then 6mg/5mg for one month, and then 5mg for one month. She has already noticed improvement in blood sugar with lower doses. For mouth dryness she can biotin spray in addition to mouth washing for dryness during the day. Return in 3 months.       Follow-up: Return in 3 months.     Subjective:  Betty Tee is a 78 year old female with a history of primary Sjogren's syndrome who presents for follow-up. She was last seen on 18 at which time she reported increased shortness of breath and had to increase her oxygen levels at home. Plan at that time was to begin a slow prednisone taper (9mg for one day then 10mg the next day for one month prior to going down to 9mg for one month, etc) until she is at 7mg daily. She also planned on resuming Fosamax. She last saw Dr. Tillman on 18 who recommended she proceed with pulmonary rehab.     Today, she reports that she began her pulmonary rehab last Monday and has been working  "on breathing techniques. She successfully tapered to 7mg prednisone this past week. On this dosage, she reports having \"low grade\" pain in her knees and hips. She also notes increased frequency of dizziness, but this is not too concerning for her. She endorses increased symptoms of dryness in her throat. Has mild symptoms of heartburn which she feels is unrelated to her symptoms of throat dryness. She has no other concerns today.     HPI:   Betty Tee is a 78 year old female with a history of primary Sjogren's syndrome who presents for follow-up. She was diagnosed with Sjogren's syndrome in 2015 with borderline +RG, +SSA, and lip biopsy focus score of 1. Associated ILD and joint pain are prednisone-responsive which support the diagnosis. Ocular staining score has been deferred given other sources of diagnosis.    Interval history: 6/7/18   Today, the patient states she is feeling short of breath as she did not bring her oxygen tank with her. She states she has been feeling a bit more short of breath at home while going up and down her basement steps recently. She last saw her pulmonologist, Dr. Walsh, on 1/10/2018 and does not have recent pulmonary function tests. She has not needed to increase her oxygen levels at home, noting she has always used 4 liters, which seems to be sufficient for her. When she is out of the house she frequently leaves her oxygen tank at home. She does currently have an irritating, non-productive cough that began approximately one week ago with associated postnasal drip. She has been experiencing night sweats and infrequent chills this week but denies any measured fevers.     She denies any joint pains today, but does state she had a flare of joint pain, specifically in her right knee, a few weeks ago. The pain hit very intensely prior to resolving.      With regards to starting rituximab, she voices she discussed this with her primary care provider who does not feel this is a good " idea.     Interval history: 3/8/18  Patient reported doing rather well but did mention some trouble with intermittent arthralgias. We discussed consideration of rituximab given problems stemming from long-term prednisone use and she elected to think about this option. Plan was made for continuation of 10mg of prednisone daily for the time being. Recommended starting Fosamax, 70mg, once per week, however she was hesitant about side effects.      Review of Systems:   Pertinent items are noted in HPI or as below, remainder of complete ROS is negative.      No recent problems with hearing or vision. No swallowing problems.   No breathing difficulty, shortness of breath, coughing, or wheezing  No chest pain or palpitations  No heart burn, indigestion, abdominal pain, nausea, vomiting, diarrhea  No urination problems, no bloody, cloudy urine, no dysuria  No numbing, tingling, weakness  No headaches or confusion  No rashes. No easy bleeding or bruising.     Active Medications:     Current Outpatient Prescriptions:      ACCU-CHEK SHANNON PLUS test strip, USE TO TEST BLOOD SUGARS 4 TIMES DAILY OR AS DIRECTED, Disp: 400 strip, Rfl: 0     acetaminophen (TYLENOL) 500 MG tablet, Take 500 mg by mouth nightly as needed , Disp: , Rfl:      acyclovir (ZOVIRAX) 400 MG tablet, Take 1 tablet (400 mg) by mouth 3 times daily For a couple days, Disp: 15 tablet, Rfl: 2     acyclovir (ZOVIRAX) 5 % external ointment, Apply topically 6 times daily As needed for outbreaks, Disp: 15 g, Rfl: 3     albuterol (PROAIR HFA, PROVENTIL HFA, VENTOLIN HFA) 108 (90 BASE) MCG/ACT inhaler, Inhale 2 puffs into the lungs every 6 hours, Disp: 1 Inhaler, Rfl: 3     alendronate (FOSAMAX) 70 MG tablet, Take 1 tablet (70 mg) by mouth every 7 days (Patient not taking: Reported on 10/22/2018), Disp: 4 tablet, Rfl: 11     atorvastatin (LIPITOR) 10 MG tablet, TAKE 1 TABLET (10 MG) BY MOUTH DAILY, Disp: 90 tablet, Rfl: 0     BASAGLAR 100 UNIT/ML injection, Inject 25  Units Subcutaneous daily (to replace lantus), Disp: 15 mL, Rfl: 1     BD JANE U/F 32G X 4 MM insulin pen needle, USE 4 DAILY AS DIRECTED., Disp: 400 each, Rfl: 1     blood glucose monitoring (ACCU-CHEK SHANNON PLUS) test strip, Use to test blood sugar 4 times daily or as directed.  Ok to substitute alternative if insurance prefers., Disp: 120 strip, Rfl: 11     blood glucose monitoring (ACCU-CHEK FASTCLIX) lancets, Use to test blood sugar 4 times daily or as directed.  Ok to substitute alternative if insurance prefers., Disp: 1 Box, Rfl: 11     blood glucose monitoring (ACCU-CHEK MULTICLIX) lancets, Use to test blood sugar 4 times daily or as directed., Disp: 4 Box, Rfl: 3     COMPRESSION STOCKINGS, Wear compression stockings in affected leg (right leg) or both legs most of the time during the day and take them off at night., Disp: 2 each, Rfl: 2     escitalopram (LEXAPRO) 20 MG tablet, TAKE 1 TABLET (20 MG) BY MOUTH DAILY, Disp: 90 tablet, Rfl: 1     fluticasone (FLONASE) 50 MCG/ACT nasal spray, Spray 1-2 sprays into both nostrils daily as needed for allergies, Disp: 1 Bottle, Rfl: 3     fluticasone (FLOVENT HFA) 220 MCG/ACT inhaler, Inhale 2 puffs into the lungs 2 times daily, Disp: 3 Inhaler, Rfl: 3     gabapentin (NEURONTIN) 300 MG capsule, Take 1 capsule (300 mg) by mouth At Bedtime (Patient not taking: Reported on 10/22/2018), Disp: 30 capsule, Rfl: 0     Humidifier MISC, Continuous humidified air via concentrator.  Diagnosis: ILD Duration: Lifetime 99., Disp: 1 each, Rfl: 1     insulin pen needle (B-D U/F) 31G X 8 MM, Use 4 times daily (with Lantus and Novolog) or as directed., Disp: 400 each, Rfl: 3     ketoconazole (NIZORAL) 2 % external cream, Apply topically 2 times daily, Disp: 60 g, Rfl: 1     lidocaine (LIDODERM) 5 % Patch, Apply patch to painful area for up to 12 h within a 24 h period.  Remove after 12 hours. (Patient not taking: Reported on 10/22/2018), Disp: 30 patch, Rfl: 0     lisinopril  (PRINIVIL/ZESTRIL) 2.5 MG tablet, Take 1 tablet (2.5 mg) by mouth daily, Disp: 90 tablet, Rfl: 2     metFORMIN (GLUCOPHAGE-XR) 500 MG 24 hr tablet, TAKE 2 TABLETS BY MOUTH DAILY WITH DINNER, Disp: 180 tablet, Rfl: 0     metoprolol succinate (TOPROL XL) 25 MG 24 hr tablet, Take 0.5 tablets (12.5 mg) by mouth 2 times daily Take 50 mg by mouth in combination with 12.5 for a total of 62.5 twice a day, Disp: 90 tablet, Rfl: 3     metoprolol succinate (TOPROL-XL) 100 MG 24 hr tablet, Take 50 mg by mouth in combination with 12.5 for a total of 62.5 twice a day, Disp: 90 tablet, Rfl: 3     montelukast (SINGULAIR) 10 MG tablet, TAKE 1 TABLET BY MOUTH EVERYDAY AT BEDTIME, Disp: 90 tablet, Rfl: 0     NOVOLOG FLEXPEN 100 UNIT/ML soln, INJECT 12 UNITS SUBCUTANEOUS 3 TIMES DAILY (WITH MEALS), Disp: 15 mL, Rfl: 1     order for DME, Equipment being ordered: Oxygen Pulsed oxygen portable tank Rate: 4LNC Diagnosis: ILD Duration: Lifetime -99, Disp: 1 Device, Rfl: 1     order for DME, Equipment being ordered: Full face mask for CPAP - size: F10 large, Disp: 1 each, Rfl: 0     order for DME, Oxygen: Patient requires supplemental Oxygen 4 LPM via nasal canula with activity. Please provide patient with a home unit and with portability capability. Oxygen will be for a lifetime., Disp: 1 Device, Rfl: 0     potassium chloride SA (K-DUR/KLOR-CON M) 20 MEQ CR tablet, Take 2 tabs (40 meq) in am and 1 tab (20 meq) in pm daily, Disp: 270 tablet, Rfl: 3     predniSONE (DELTASONE) 1 MG tablet, Use with 5 mg tabs to taper: 10 mg and 9 mg every other day for 1m, 9 mg/day for 1m, 9 mg and 8 mg every other day for 1m, etc. until at 7mg, Disp: 120 tablet, Rfl: 6     predniSONE (DELTASONE) 5 MG tablet, Use with 1 mg tabs to taper: 10 mg and 9 mg every other day for 1m, 9 mg/day for 1m, 9 mg and 8 mg every other day for 1m, etc. until at 7mg, Disp: 30 tablet, Rfl: 6     spironolactone (ALDACTONE) 25 MG tablet, Take 2 tablets (50 mg) by mouth daily, Disp:  180 tablet, Rfl: 3     tiZANidine (ZANAFLEX) 2 MG tablet, Take 1-2 tablets (2-4 mg) by mouth 3 times daily, Disp: 90 tablet, Rfl: 1     torsemide (DEMADEX) 20 MG tablet, Take 40 mg in the morning, 30 mg in the afternoon., Disp: 105 tablet, Rfl: 3     traZODone (DESYREL) 50 MG tablet, Take 0.5-1 tablets (25-50 mg) by mouth nightly as needed for sleep, Disp: 30 tablet, Rfl: 5     warfarin (COUMADIN) 7.5 MG tablet, TAKE 1 TABLET BY MOUTH DAILY. CURRENT DOSE IS 7.5 MG DAILY. DOSE ADJUSTED PER INR RESULT., Disp: 90 tablet, Rfl: 3      Allergies:   Augmentin\  Phenobarbital  Seasonal allergies      Past Medical History:  Alcohol abuse, in remission.   Allergic rhinitis.   Antiplatelet long-term use.   Atrial fibrillation.   Cardiomegaly.   Osteopenia.   Diverticulosis.   Benign essential hypertension.   GERD.   Insomnia.   Irregular heart beat.   Lumbago.   Major depressive disorder, recurrent episode, moderate.   ANGELICA.  Osteoarthrosis.   Sjogren's syndrome.   Sleep apnea.   Tobacco use disorder.   TMJ disorder.   RLS.  Major depressive disorder.   Hypertension.   Sciatica.   Colouterine fistula.   Left ventricular hypertrophy.   Breat fibroadenoma.   DVT.   Fatty liver disease, nonalcoholic.   Rib pain.   Neck mass.   Interstitial lung disease.   Acute and chronic respiratory failure with hypoxia.   Type II diabetes mellitus.   Hyperlipidemia.   Inflammatory arthritis.      Past Surgical History:  Back surgery.   Left breast biopsy.   Appendectomy.   Exploratory laparotomy.   Cardiac surgery.   Cholecystectomy.   Left colectomy.   Total abdominal hysterectomy with bilateral salpingo-oophorectomy.   Insert ureter stent.   Flexible sigmoidoscopy.   Ileostomy takedown.     Family History:   Mother: Positive for CAD, heart disease, hypertension, cerebrovascular disease, hyperlipidemia.   Father: Positive for alcohol/drug abuse, Alzheimer disease, dementia, hypertension, hyperlipidemia.   Sister: Positive for CAD,  "hypertension, and colorectal cancer.   Sister: Positive for hypertension and hyperlipidemia.   Sister: Positive for diabetes, lung cancer, asthma, and heart disease.   Brother: Positive for Parkinsonism and substance abuse.   Brother: Positive for hypertension, diverticulitis, and prostate cancer.   Daughter: Positive for breast cancer.         Social History:   She is a former smoker; quit in 2011. She has a history of alcohol abuse but stopped drinking in 1986.      Physical Exam:   /68  Pulse 82  Temp 98.2  F (36.8  C) (Oral)  Ht 1.702 m (5' 7\")  Wt 97.9 kg (215 lb 14.4 oz)  SpO2 96%  BMI 33.81 kg/m2   Wt Readings from Last 4 Encounters:   12/06/18 97.9 kg (215 lb 14.4 oz)   11/28/18 95.7 kg (211 lb)   11/01/18 95.7 kg (211 lb)   10/22/18 96 kg (211 lb 9.6 oz)     Constitutional: Well-developed, appearing stated age; cooperative  Eyes: Normal EOM, PERRLA, vision, conjunctiva, sclera  ENT: Normal external ears, nose, hearing, lips, teeth, gums, throat. No mucous membrane lesions, normal saliva pool  Neck: No mass or thyroid enlargement  Resp: Good air movement, + mild scattered wheeze in the upper lobes BL  CV: no murmurs, rubs or gallops, no edema. Irregular rate, normal rhythm.   GI: No ABD mass or tenderness, no HSM  : Not tested  Lymph: No cervical, supraclavicular, inguinal or epitrochlear nodes  MS: The TMJ, neck, shoulder, elbow, wrist, MCP/PIP/DIP, spine, hip, knee, ankle, and foot MTP/IP joints were examined and found normal. No active synovitis or altered joint anatomy. Full joint ROM. Normal  strength. No dactylitis,  tenosynovitis, enthesopathy.  Skin: No nail pitting, alopecia, rash, nodules or lesions  Neuro: grossly non-focal  Psych: Normal judgement, orientation, memory, affect.    Images:  CT Chest w/o contrast (7/25/18):  Findings remain most consistent with small airway disease  and/or nonclassifiable interstitial lung disease and are not  significantly changed from the March " 2017 comparison.    Per radiology.    Laboratory:   RHEUM RESULTS Latest Ref Rng & Units 4/18/2018 5/23/2018 10/22/2018   SED RATE 0 - 30 mm/h - - -   CRP, INFLAMMATION 0.0 - 8.0 mg/L - - -   CK TOTAL 30 - 225 U/L - - -   RHEUMATOID FACTOR <20 IU/mL - - -   RG SCREEN BY EIA <1.0 - - -   AST 0 - 45 U/L - 15 -   ALT 0 - 50 U/L - 21 -   ALBUMIN 3.4 - 5.0 g/dL - 3.3(L) -   WBC 4.0 - 11.0 10e9/L - - -   RBC 3.8 - 5.2 10e12/L - - -   HGB 11.7 - 15.7 g/dL - - -   HCT 35.0 - 47.0 % - - -   MCV 78 - 100 fl - - -   MCHC 31.5 - 36.5 g/dL - - -   RDW 10.0 - 15.0 % - - -    - 450 10e9/L - - -   CREATININE 0.52 - 1.04 mg/dL 0.90 0.90 0.97   GFR ESTIMATE, IF BLACK >60 mL/min/1.7m2 73 74 67   GFR ESTIMATE >60 mL/min/1.7m2 60(L) 61 55(L)    - 1620 mg/dL - - -       Rheumatoid Factor   Date Value Ref Range Status   07/24/2015 <20 <20 IU/mL Final   ,  ,   Cyclic Cit Pept IgG/IgA   Date Value Ref Range Status   07/24/2015 <20  Interpretation:  Negative   <20 UNITS Final   ,  ,   Scleroderma Antibody Scl-70 CECILIA IgG   Date Value Ref Range Status   07/24/2015  0.0 - 0.9 AI Final    <0.2  Negative   Antibody index (AI) values reflect qualitative changes in antibody   concentration that cannot be directly associated with clinical condition or   disease state.       SSA (Ro) (CECILIA) Antibody, IgG   Date Value Ref Range Status   07/24/2015 1.6 (H) 0.0 - 0.9 AI Final     Comment:     Positive   Antibody index (AI) values reflect qualitative changes in antibody   concentration that cannot be directly associated with clinical condition or   disease state.       SSB (La) (CECILIA) Antibody, IgG   Date Value Ref Range Status   07/24/2015  0.0 - 0.9 AI Final    <0.2  Negative   Antibody index (AI) values reflect qualitative changes in antibody   concentration that cannot be directly associated with clinical condition or   disease state.       ,  ,   RG Screen by EIA   Date Value Ref Range Status   07/24/2015 <1.0  Interpretation:   Negative   <1.0 Final   ,  ,  ,  ,  ,  ,  ,  ,  ,  ,  ,  ,  ,  ,  ,  ,  ,  ,   Neutrophil Cytoplasmic IgG Antibody   Date Value Ref Range Status   07/24/2015   Final    <1:20  Reference range: <1:20  (Note)  The ANCA IFA is <1:20; therefore, no further testing will  be performed.  INTERPRETIVE INFORMATION: Anti-Neutrophil Cyto Ab, IgG  Neutrophil Cytoplasmic Antibodies (C-ANCA = granular  cytoplasmic staining, P-ANCA = perinuclear staining) are  found in the serum of over 90 percent of patients with  certain necrotizing systemic vasculitides, and usually in  less than 5 percent of patients with collagen vascular  disease or arthritis.  Performed by [facility],  [address] [place] [address] [phone]  [email or web address], Burke Mckeon MD, Lab. Director       ,  ,  ,  ,  ,  ,  ,   IGG   Date Value Ref Range Status   07/24/2015 1320 695 - 1620 mg/dL Final   ,  ,  ,  ,  ,   Scleroderma Antibody Scl-70 CECILIA IgG   Date Value Ref Range Status   07/24/2015  0.0 - 0.9 AI Final    <0.2  Negative   Antibody index (AI) values reflect qualitative changes in antibody   concentration that cannot be directly associated with clinical condition or   disease state.       ,  ,             Scribe Disclosure:   I, Colton Norris, am serving as a scribe to document services personally performed by Carmenza Stringer MD at this visit, based upon the provider's statements to me. All documentation has been reviewed by the aforementioned provider prior to being entered into the official medical record.     Portions of this medical record were completed by a scribe. UPON MY REVIEW AND AUTHENTICATION BY ELECTRONIC SIGNATURE, this confirms (a) I performed the applicable clinical services, and (b) the record is accurate.    Attending Attestation:    I reviewed and edited the above scribed note to reflect my findings and plan.     I discussed the findings and recommendations with the patient.  Recommendations were discussed with the  consulting team.  Time spent: 30 minutes    Carmenza Stringer MD, MS  Rheumatology Attending Physician  [facility] [phone]

## 2018-12-30 DIAGNOSIS — E11.9 TYPE 2 DIABETES MELLITUS WITHOUT COMPLICATION, WITHOUT LONG-TERM CURRENT USE OF INSULIN (H): ICD-10-CM

## 2018-12-31 RX ORDER — METFORMIN HCL 500 MG
TABLET, EXTENDED RELEASE 24 HR ORAL
Qty: 180 TABLET | Refills: 0 | Status: SHIPPED | OUTPATIENT
Start: 2018-12-31 | End: 2019-03-17

## 2018-12-31 NOTE — TELEPHONE ENCOUNTER
"Prescription approved per [facility] [place] [facility].  Lydia HALEY RN    Requested Prescriptions   Pending Prescriptions Disp Refills     metFORMIN (GLUCOPHAGE-XR) 500 MG 24 hr tablet [Pharmacy Med Name: METFORMIN  MG TABLET] 180 tablet 0     Sig: TAKE 2 TABLETS BY MOUTH DAILY WITH DINNER    Biguanide Agents Passed - 12/30/2018 11:29 PM       Passed - Blood pressure less than 140/90 in past 6 months    BP Readings from Last 3 Encounters:   12/06/18 112/68   11/28/18 111/77   11/01/18 109/73                Passed - Patient has documented LDL within the past 12 mos.    Recent Labs   Lab Test 05/23/18  1028   LDL 21            Passed - Patient has had a Microalbumin in the past 15 mos.    Recent Labs   Lab Test 10/02/18  1524   MICROL 8   UMALCR 29.14*            Passed - Patient is age 10 or older       Passed - Patient has documented A1c within the specified period of time.    If HgbA1C is 8 or greater, it needs to be on file within the past 3 months.  If less than 8, must be on file within the past 6 months.     Recent Labs   Lab Test 10/02/18  1524   A1C 6.7*            Passed - Patient's CR is NOT>1.4 OR Patient's EGFR is NOT<45 within past 12 mos.    Recent Labs   Lab Test 10/22/18  1246   GFRESTIMATED 55*   GFRESTBLACK 67       Recent Labs   Lab Test 10/22/18  1246   CR 0.97            Passed - Patient does NOT have a diagnosis of CHF.       Passed - Patient is not pregnant       Passed - Patient has not had a positive pregnancy test within the past 12 mos.        Passed - Recent (6 mo) or future (30 days) visit within the authorizing provider's specialty    Patient had office visit in the last 6 months or has a visit in the next 30 days with authorizing provider or within the authorizing provider's specialty.  See \"Patient Info\" tab in inbasket, or \"Choose Columns\" in Meds & Orders section of the refill encounter.            Next 5 appointments (look out 90 days)    Feb 26, 2019 12:45 PM CST  Office Visit with " Ilene Tristan MD  [facility] ([facility]) [address] [place]  [facility] [address] [phone]

## 2019-01-22 DIAGNOSIS — M35.02 SJOGREN'S SYNDROME WITH LUNG INVOLVEMENT (H): ICD-10-CM

## 2019-01-22 DIAGNOSIS — J84.9 ILD (INTERSTITIAL LUNG DISEASE) (H): ICD-10-CM

## 2019-01-23 RX ORDER — MONTELUKAST SODIUM 10 MG/1
TABLET ORAL
Qty: 90 TABLET | Refills: 0 | Status: SHIPPED | OUTPATIENT
Start: 2019-01-23 | End: 2019-04-17

## 2019-01-23 NOTE — TELEPHONE ENCOUNTER
"Prescription approved per [facility] Refill Protocol.  Lydia HALEY RN    Requested Prescriptions   Pending Prescriptions Disp Refills     montelukast (SINGULAIR) 10 MG tablet [Pharmacy Med Name: MONTELUKAST SOD 10 MG TABLET] 90 tablet 0     Sig: TAKE 1 TABLET BY MOUTH EVERYDAY AT BEDTIME    Leukotriene Inhibitors Protocol Passed - 1/22/2019 12:42 PM       Passed - Patient is age 12 or older    If patient is under 16, ok to refill using age based dosing.          Passed - Recent (12 mo) or future (30 days) visit within the authorizing provider's specialty    Patient had office visit in the last 12 months or has a visit in the next 30 days with authorizing provider or within the authorizing provider's specialty.  See \"Patient Info\" tab in inbasket, or \"Choose Columns\" in Meds & Orders section of the refill encounter.             Passed - Medication is active on med list        Next 5 appointments (look out 90 days)    Feb 26, 2019 12:45 PM CST  Office Visit with Ilene Tristan MD  [facility] ([facility]) [address] [facility] [id number] [phone]          "

## 2019-02-03 DIAGNOSIS — E78.5 HYPERLIPIDEMIA LDL GOAL <100: ICD-10-CM

## 2019-02-04 DIAGNOSIS — M35.00 PRIMARY SJOGREN'S SYNDROME (H): ICD-10-CM

## 2019-02-04 RX ORDER — ATORVASTATIN CALCIUM 10 MG/1
TABLET, FILM COATED ORAL
Qty: 90 TABLET | Refills: 0 | Status: SHIPPED | OUTPATIENT
Start: 2019-02-04 | End: 2019-05-07

## 2019-02-04 NOTE — TELEPHONE ENCOUNTER
"Prescription approved per [facility] Refill Protocol.  Lydia HALEY, RN    Requested Prescriptions   Pending Prescriptions Disp Refills     atorvastatin (LIPITOR) 10 MG tablet [Pharmacy Med Name: ATORVASTATIN 10 MG TABLET] 90 tablet 0     Sig: TAKE 1 TABLET BY MOUTH EVERY DAY    Statins Protocol Passed - 2/3/2019  2:55 PM       Passed - LDL on file in past 12 months    Recent Labs   Lab Test 05/23/18  1028   LDL 21            Passed - No abnormal creatine kinase in past 12 months    Recent Labs   Lab Test 07/24/15  1236   CKT 57               Passed - Recent (12 mo) or future (30 days) visit within the authorizing provider's specialty    Patient had office visit in the last 12 months or has a visit in the next 30 days with authorizing provider or within the authorizing provider's specialty.  See \"Patient Info\" tab in inbasket, or \"Choose Columns\" in Meds & Orders section of the refill encounter.             Passed - Medication is active on med list       Passed - Patient is age 18 or older       Passed - No active pregnancy on record       Passed - No positive pregnancy test in past 12 months        Next 5 appointments (look out 90 days)    Feb 26, 2019 12:45 PM CST  Office Visit with Ilene Tristan MD  [facility] ([facility]) [address] [facility] [address] [phone]          "

## 2019-02-05 NOTE — TELEPHONE ENCOUNTER
predniSONE (DELTASONE) 5 MG tablet      Last Written Prescription Date:  6/7/18  Last Fill Quantity: 30,   # refills: 6  Last Office Visit : 12/6/18  Future Office visit:  4/11/19    Routing refill request to provider's nurse for review/approval because:  Taper medication-needs review.

## 2019-02-08 ENCOUNTER — MYC MEDICAL ADVICE (OUTPATIENT)
Dept: RHEUMATOLOGY | Facility: CLINIC | Age: 79
End: 2019-02-08

## 2019-02-08 DIAGNOSIS — M35.00 PRIMARY SJOGREN'S SYNDROME (H): ICD-10-CM

## 2019-02-08 NOTE — TELEPHONE ENCOUNTER
2/8/2019  12:33 PM      RN Care Coordinator Encounter   Attempted to connect with patient/care worker. No answer. Left message for patient/care worker to contact the clinic to review current Prednisone dosage since patient was on a taper. [employer] message also sent.           Sharron Sears RN, BSN, PHN  [facility]  RN Care Coordinator Medicine Specialty Pool Nurse   (Nephrology, Rheumatology, Infectious Disease & Hepatology)

## 2019-02-10 DIAGNOSIS — E11.69 TYPE 2 DIABETES MELLITUS WITH OTHER SPECIFIED COMPLICATION (H): ICD-10-CM

## 2019-02-10 DIAGNOSIS — E11.65 TYPE 2 DIABETES MELLITUS WITH HYPERGLYCEMIA, WITH LONG-TERM CURRENT USE OF INSULIN (H): ICD-10-CM

## 2019-02-10 DIAGNOSIS — Z79.4 TYPE 2 DIABETES MELLITUS WITH HYPERGLYCEMIA, WITH LONG-TERM CURRENT USE OF INSULIN (H): ICD-10-CM

## 2019-02-11 DIAGNOSIS — I50.30 (HFPEF) HEART FAILURE WITH PRESERVED EJECTION FRACTION (H): Primary | ICD-10-CM

## 2019-02-11 RX ORDER — PREDNISONE 5 MG/1
5 TABLET ORAL DAILY
Qty: 60 TABLET | Refills: 0 | Status: SHIPPED | OUTPATIENT
Start: 2019-02-11 | End: 2019-06-18

## 2019-02-11 NOTE — TELEPHONE ENCOUNTER
[facility] Call Center    Phone Message    May a detailed message be left on voicemail: yes    Reason for Call: Other: PT:  PT is wondering why she can't get a refill of Prednisone 5 MG, Pt was instructed to call [facility] from her pharmacy. Pt states she is merlyn for her 3 month f/u on 4/11/19, please review and call pt to further assist thanks.       Action Taken: Message routed to:  [facility] ([facility]

## 2019-02-12 RX ORDER — INSULIN ASPART 100 [IU]/ML
INJECTION, SOLUTION INTRAVENOUS; SUBCUTANEOUS
Qty: 15 ML | Refills: 1 | Status: SHIPPED | OUTPATIENT
Start: 2019-02-12 | End: 2019-04-27

## 2019-02-12 RX ORDER — INSULIN GLARGINE 100 [IU]/ML
25 INJECTION, SOLUTION SUBCUTANEOUS DAILY
Qty: 15 ML | Refills: 1 | Status: SHIPPED | OUTPATIENT
Start: 2019-02-12 | End: 2019-06-09

## 2019-02-12 NOTE — TELEPHONE ENCOUNTER
"Requested Prescriptions   Pending Prescriptions Disp Refills     NOVOLOG FLEXPEN 100 UNIT/ML soln [Pharmacy Med Name: NOVOLOG 100 UNITS/ML FLEXPEN]  0     Sig: INJECT 12 UNITS SUBCUTANEOUS 3 TIMES DAILY (WITH MEALS)    Short Acting Insulin Protocol Passed - 2/10/2019 11:31 AM       Passed - Blood pressure less than 140/90 in past 6 months    BP Readings from Last 3 Encounters:   12/06/18 112/68   11/28/18 111/77   11/01/18 109/73                Passed - LDL on file in past 12 months    Recent Labs   Lab Test 05/23/18  1028   LDL 21            Passed - Microalbumin on file in past 12 months    Recent Labs   Lab Test 10/02/18  1524   MICROL 8   UMALCR 29.14*            Passed - Serum creatinine on file in past 12 months    Recent Labs   Lab Test 10/22/18  1246  04/02/17  2213   CR 0.97   < >  --    CREAT  --   --  0.6    < > = values in this interval not displayed.            Passed - HgbA1C in past 3 or 6 months    If HgbA1C is 8 or greater, it needs to be on file within the past 3 months.  If less than 8, must be on file within the past 6 months.     Recent Labs   Lab Test 10/02/18  1524   A1C 6.7*            Passed - Medication is active on med list       Passed - Patient is age 18 or older       Passed - Recent (6 mo) or future (30 days) visit within the authorizing provider's specialty    Patient had office visit in the last 6 months or has a visit in the next 30 days with authorizing provider or within the authorizing provider's specialty.  See \"Patient Info\" tab in inbasket, or \"Choose Columns\" in Meds & Orders section of the refill encounter.            insulin glargine (BASAGLAR KWIKPEN) 100 UNIT/ML pen [Pharmacy Med Name: BASAGLAR 100 UNIT/ML KWIKPEN]  1     Sig: INJECT 25 UNITS SUBCUTANEOUS DAILY (TO REPLACE LANTUS)    Long Acting Insulin Protocol Passed - 2/10/2019 11:31 AM       Passed - Blood pressure less than 140/90 in past 6 months    BP Readings from Last 3 Encounters:   12/06/18 112/68   11/28/18 " "111/77   11/01/18 109/73                Passed - LDL on file in past 12 months    Recent Labs   Lab Test 05/23/18  1028   LDL 21            Passed - Microalbumin on file in past 12 months    Recent Labs   Lab Test 10/02/18  1524   MICROL 8   UMALCR 29.14*            Passed - Serum creatinine on file in past 12 months    Recent Labs   Lab Test 10/22/18  1246  04/02/17  2213   CR 0.97   < >  --    CREAT  --   --  0.6    < > = values in this interval not displayed.            Passed - HgbA1C in past 3 or 6 months    If HgbA1C is 8 or greater, it needs to be on file within the past 3 months.  If less than 8, must be on file within the past 6 months.     Recent Labs   Lab Test 10/02/18  1524   A1C 6.7*            Passed - Medication is active on med list       Passed - Patient is age 18 or older       Passed - Recent (6 mo) or future (30 days) visit within the authorizing provider's specialty    Patient had office visit in the last 6 months or has a visit in the next 30 days with authorizing provider or within the authorizing provider's specialty.  See \"Patient Info\" tab in inbasket, or \"Choose Columns\" in Meds & Orders section of the refill encounter.            "

## 2019-02-13 ENCOUNTER — OFFICE VISIT (OUTPATIENT)
Dept: OPHTHALMOLOGY | Facility: CLINIC | Age: 79
End: 2019-02-13
Attending: OPHTHALMOLOGY
Payer: MEDICARE

## 2019-02-13 DIAGNOSIS — I50.30 (HFPEF) HEART FAILURE WITH PRESERVED EJECTION FRACTION (H): ICD-10-CM

## 2019-02-13 DIAGNOSIS — H11.31 SUBCONJUNCTIVAL HEMORRHAGE OF RIGHT EYE: Primary | ICD-10-CM

## 2019-02-13 LAB
ANION GAP SERPL CALCULATED.3IONS-SCNC: 8 MMOL/L (ref 3–14)
BUN SERPL-MCNC: 24 MG/DL (ref 7–30)
CALCIUM SERPL-MCNC: 9.3 MG/DL (ref 8.5–10.1)
CHLORIDE SERPL-SCNC: 104 MMOL/L (ref 94–109)
CO2 SERPL-SCNC: 25 MMOL/L (ref 20–32)
CREAT SERPL-MCNC: 0.87 MG/DL (ref 0.52–1.04)
GFR SERPL CREATININE-BSD FRML MDRD: 64 ML/MIN/{1.73_M2}
GLUCOSE SERPL-MCNC: 135 MG/DL (ref 70–99)
POTASSIUM SERPL-SCNC: 4.3 MMOL/L (ref 3.4–5.3)
SODIUM SERPL-SCNC: 137 MMOL/L (ref 133–144)

## 2019-02-13 PROCEDURE — G0463 HOSPITAL OUTPT CLINIC VISIT: HCPCS | Mod: ZF

## 2019-02-13 ASSESSMENT — CONF VISUAL FIELD
OS_NORMAL: 1
METHOD: COUNTING FINGERS
OD_NORMAL: 1

## 2019-02-13 ASSESSMENT — CUP TO DISC RATIO
OD_RATIO: 0.3
OS_RATIO: 0.3

## 2019-02-13 ASSESSMENT — REFRACTION_WEARINGRX
OS_SPHERE: -0.25
SPECS_TYPE: LINED BIF
OS_CYLINDER: +0.75
OS_AXIS: 135
OS_ADD: +2.75
OD_SPHERE: PLANO
OD_CYLINDER: +0.50
OD_ADD: +2.50
OD_AXIS: 175

## 2019-02-13 ASSESSMENT — TONOMETRY
OD_IOP_MMHG: 13
IOP_METHOD: TONOPEN
OS_IOP_MMHG: 14

## 2019-02-13 ASSESSMENT — EXTERNAL EXAM - LEFT EYE: OS_EXAM: NORMAL

## 2019-02-13 ASSESSMENT — VISUAL ACUITY
OS_CC: 20/20
OD_CC: 20/30
METHOD: SNELLEN - LINEAR
CORRECTION_TYPE: GLASSES

## 2019-02-13 ASSESSMENT — SLIT LAMP EXAM - LIDS
COMMENTS: NORMAL, DECREASED TEAR LAKE
COMMENTS: NORMAL, DECREASED TEAR LAKE

## 2019-02-13 NOTE — NURSING NOTE
Chief Complaints and History of Present Illnesses   Patient presents with     Red Eye Left Eye     Chief Complaint(s) and History of Present Illness(es)     Red Eye Left Eye     Laterality: left eye    Characteristics: red blood on eye    Associated symptoms: eye pain and blurred vision    Pain scale: 4/10 (More when moving the eye)    Frequency: constantly    Duration: days              Comments     Started 3 days ago  Justine Neal COT 2:05 PM February 13, 2019

## 2019-02-13 NOTE — PROGRESS NOTES
HPI:  Patient is a 77 yo F who presents with right eye redness which started a few days ago and progressively worsened. Today she woke up with periorbital bruising and the entire surface of the eye is severely red. There is associated irritation with eyelid closure. Mild pain. Vision little blurry. Mild headache. She is on Warfarin for Afib, long overdue for INR check. She notes nosebleeds in the morning for the last couple months as well as fatigue lately. Last INR 2.5 in November 2018.    OphthoMeds:  None    POH:  Glasses  DM A1c 6.7 10/2/18  S/p CE/IOL BE [facility] >5 years ago    FamHx:  Glaucoma in sister and uncle    A&P    YUKI, right eye  -Large YUKI with periorbital bruising, no trauma, pt on Coumadin, overdue for INR check  -Given recent nosebleeds and fatigue, recommend INR and CBC  -She has a standing order for INR, so she will go get the lab draw as soon as she is done with this appointment. She has a follow-up with her cardiologist tomorrow.  -Recommend ATs for comfort  -Explained that the bleeding will take a few weeks to resolve     DM  -no retinopathy   -recommend BS/BP control  -Yearly dilated exam    Pseudophakia BE  -s/p yag cap BE    Sjogrens's  -mild dry eye  -also uses CPAP  -rec refresh pm or systane nighttime (or generic PF equivalent)     Shante Nash MD  Ophthalmology Resident, PGY-2    Teaching statement:  Complete documentation of historical and exam elements from today's encounter can be found in the full encounter summary report (not reduplicated in this progress note). I personally obtained the chief complaint(s) and history of present illness.  I confirmed and edited as necessary the review of systems, past medical/surgical history, family history, social history, and examination findings as documented by others; and I examined the patient myself. I personally reviewed the relevant tests, images, and reports as documented above.     I formulated and edited as  necessary the assessment and plan and discussed the findings and management plan with the patient and family.    Kami Lang MD  Comprehensive Ophthalmology & Ocular Pathology  Department of Ophthalmology and Visual Neurosciences  [email or web address]  Pager [phone]

## 2019-02-14 ENCOUNTER — ANCILLARY PROCEDURE (OUTPATIENT)
Dept: CARDIOLOGY | Facility: CLINIC | Age: 79
End: 2019-02-14
Attending: INTERNAL MEDICINE
Payer: MEDICARE

## 2019-02-14 VITALS
DIASTOLIC BLOOD PRESSURE: 62 MMHG | WEIGHT: 211 LBS | HEIGHT: 67 IN | SYSTOLIC BLOOD PRESSURE: 91 MMHG | HEART RATE: 67 BPM | OXYGEN SATURATION: 99 % | BODY MASS INDEX: 33.12 KG/M2

## 2019-02-14 DIAGNOSIS — I48.21 PERMANENT ATRIAL FIBRILLATION (H): ICD-10-CM

## 2019-02-14 DIAGNOSIS — I10 ESSENTIAL HYPERTENSION WITH GOAL BLOOD PRESSURE LESS THAN 140/90: ICD-10-CM

## 2019-02-14 DIAGNOSIS — I48.21 PERMANENT ATRIAL FIBRILLATION (H): Primary | ICD-10-CM

## 2019-02-14 DIAGNOSIS — I50.30 (HFPEF) HEART FAILURE WITH PRESERVED EJECTION FRACTION (H): ICD-10-CM

## 2019-02-14 DIAGNOSIS — Z79.01 LONG TERM CURRENT USE OF ANTICOAGULANT THERAPY: ICD-10-CM

## 2019-02-14 LAB — INR PPP: 2.37 (ref 0.86–1.14)

## 2019-02-14 PROCEDURE — 85610 PROTHROMBIN TIME: CPT | Performed by: FAMILY MEDICINE

## 2019-02-14 PROCEDURE — 99214 OFFICE O/P EST MOD 30 MIN: CPT | Mod: ZP | Performed by: INTERNAL MEDICINE

## 2019-02-14 PROCEDURE — 36415 COLL VENOUS BLD VENIPUNCTURE: CPT | Performed by: FAMILY MEDICINE

## 2019-02-14 PROCEDURE — G0463 HOSPITAL OUTPT CLINIC VISIT: HCPCS | Mod: ZF

## 2019-02-14 RX ORDER — PREDNISONE 5 MG/1
TABLET ORAL
Qty: 60 TABLET | Refills: 3 | Status: SHIPPED | OUTPATIENT
Start: 2019-02-14 | End: 2019-06-18

## 2019-02-14 ASSESSMENT — PAIN SCALES - GENERAL: PAINLEVEL: NO PAIN (0)

## 2019-02-14 ASSESSMENT — MIFFLIN-ST. JEOR: SCORE: 1469.72

## 2019-02-14 NOTE — NURSING NOTE
Chief Complaint   Patient presents with     Follow Up     HFpEF     Vitals were taken and medications were reconciled.    3:34 PM Sukumar Johnson, Student CMA

## 2019-02-14 NOTE — PATIENT INSTRUCTIONS
Cardiology Providers you saw during your visit:  Dr. Rosa    Medication changes:  Please stop taking Lisinopril.  Please STOP taking Warfarin; Once you have been off Warfarin for 2 days please Start taking Xarelto 20 mg by mouth once daily with Dinner.    Follow up:    Please return to clinic for follow-up:  With Dr. Rosa in 6 months with labs prior      Please call if you have :  1. Weight gain of more than 2 pounds in a day or 5 pounds in a week  2. Increased shortness of breath, swelling or bloating  3. Dizziness, lightheadedness   4. Any questions or concerns.       Follow the American Heart Association Diet and Lifestyle recommendations:  Limit saturated fat, trans fat, sodium, red meat, sweets and sugar-sweetened beverages. If you choose to eat red meat, compare labels and select the leanest cuts available.  Aim for at least 150 minutes of moderate physical activity or 75 minutes of vigorous physical activity - or an equal combination of both - each week.      During business hours: [phone], press option # 1 to be routed to the [facility] then option # 3 for medical questions to speak with a nurse      After hours, weekends or holidays: On Call Cardiologist- [phone]   option #4 and ask to speak to the on-call Cardiologist. Inform them you are a CORE/heart failure patient at the [facility].      Eyal Luu RN  Cardiology Nurse Care Coordinator    Keep up the good work!    Take Care!

## 2019-02-14 NOTE — TELEPHONE ENCOUNTER
Pt reported to Maite that she needs a refill of her prednisone 5 mg tablets.  She is currently on 7 mg a day of prednisone. Would like refill sent to the [facility].    Verified with pt, that she is currently on 7 mg a day and is almost out of 5 mg tablets.    Kamille Ruffin RN  Rheumatology Clinic

## 2019-02-14 NOTE — LETTER
2/14/2019      RE: Betty Tee  [address]  [facility] [address]       Dear Colleague,    Thank you for the opportunity to participate in the care of your patient, Betty Tee, at the [facility] HEART [facility]. Please see a copy of my visit note below.    February 14, 2019    Follow up HFpEF    HPI:   77 yo F with interstitial lung disease (moderate restriction), Sjogren's syndrome, HTN, permanent atrial fibrillation, diverticulitis s/p colectomy 2011 who I am following for HFpEF. She is here for routine HFpEF follow up.     Since I last saw her she feels her breathing is stable. She can walk a block or two before stopping from SOB.  Her weight has been stable. She denies orthopnea/ PND. Her leg swelling is improved from the last we saw her and is now minimal.  She denies chest pain. She has not been in the hospital.     She does use oxygen when doing more strenuous physical activity.     Her blood pressures have been running on the low side and she does report some dizziness.     Of note, she developed an eye hemorrhage a few days ago without trauma and has seen optho for this. INR was within range.     PAST MEDICAL HISTORY:  Past Medical History:   Diagnosis Date     Alcohol abuse, in remission      Allergic rhinitis, cause unspecified     allegra helps when she takes it     Antiplatelet or antithrombotic long-term use      Atrial fibrillation (H)     in hosp in 11/11 after surgery w/ fluid overload     Cardiomegaly     LVH on stress echo- cardiac w/u at [facility] ER- neg CT scan for PE, neg stress echo in 8/06     Chest pain, unspecified      Disorder of bone and cartilage, unspecified     osteopenia (had been on prempro), improved on 6/06 dexa, stable dexa 11/10     Diverticulosis of colon (without mention of hemorrhage)     last episode yrs ago     Essential hypertension, benign      Follicular bronchiolitis (H)     associated with Sjogrens, dx by  chest CT showing mosaic attenuation and air trapping     Gastro-oesophageal reflux disease      ILD (interstitial lung disease) (H)     associated with Sjogrens, also has mildly elevated IgG4, first noted on chest CT 2015 (mild changes) and also has small airways disease     Insomnia, unspecified     weaned off clonazepam     Irregular heart beat      Lumbago 7/09    MRI with DJD, now seeing Dr. Cain for sciatic sx's     Major depressive disorder, recurrent episode, moderate (H)      Obstructive sleep apnea      Osteoarthrosis, unspecified whether generalized or localized, unspecified site      Sjogren's syndrome (H)     + RG and SSA and lip bx     Sleep apnea      Tobacco use disorder     chantix in 9/07, started again in 6/08, working       FAMILY HISTORY:  Mother had MI and CHF in her 60's. 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      CURRENT MEDICATIONS:  Current Outpatient Medications   Medication Sig Dispense Refill     ACCU-CHEK SHANNON PLUS test strip USE TO TEST BLOOD SUGARS 4 TIMES DAILY OR AS DIRECTED 400 strip 0     acetaminophen (TYLENOL) 500 MG tablet Take 500 mg by mouth nightly as needed        acyclovir (ZOVIRAX) 400 MG tablet Take 1 tablet (400 mg) by mouth 3 times daily For a couple days 15 tablet 2     acyclovir (ZOVIRAX) 5 % external ointment Apply topically 6 times daily As needed for outbreaks 15 g 3     albuterol (PROAIR HFA, PROVENTIL HFA, VENTOLIN HFA) 108 (90 BASE) MCG/ACT inhaler Inhale 2 puffs into the lungs every 6 hours 1 Inhaler 3     alendronate (FOSAMAX) 70 MG tablet Take 1 tablet (70 mg) by mouth every 7 days 4 tablet 11     atorvastatin (LIPITOR) 10 MG tablet TAKE 1 TABLET BY MOUTH EVERY DAY 90 tablet 0     BASAGLAR 100 UNIT/ML injection Inject 25 Units Subcutaneous daily 7.5 mL 2     BD JANE U/F 32G X 4 MM insulin pen needle USE 4 DAILY AS DIRECTED. 400 each 1     blood glucose monitoring  (ACCU-CHEK SHANNON PLUS) test strip Use to test blood sugar 4 times daily or as directed.  Ok to substitute alternative if insurance prefers. 120 strip 11     blood glucose monitoring (ACCU-CHEK FASTCLIX) lancets Use to test blood sugar 4 times daily or as directed.  Ok to substitute alternative if insurance prefers. 1 Box 11     blood glucose monitoring (ACCU-CHEK MULTICLIX) lancets Use to test blood sugar 4 times daily or as directed. 4 Box 3     COMPRESSION STOCKINGS Wear compression stockings in affected leg (right leg) or both legs most of the time during the day and take them off at night. 2 each 2     escitalopram (LEXAPRO) 20 MG tablet TAKE 1 TABLET (20 MG) BY MOUTH DAILY 90 tablet 1     fluticasone (FLONASE) 50 MCG/ACT nasal spray Spray 1-2 sprays into both nostrils daily as needed for allergies 1 Bottle 3     fluticasone (FLOVENT HFA) 220 MCG/ACT inhaler Inhale 2 puffs into the lungs 2 times daily 3 Inhaler 3     gabapentin (NEURONTIN) 300 MG capsule Take 1 capsule (300 mg) by mouth At Bedtime 30 capsule 0     Humidifier MISC Continuous humidified air via concentrator.   Diagnosis: ILD  Duration: Lifetime 99. 1 each 1     insulin glargine (BASAGLAR KWIKPEN) 100 UNIT/ML pen INJECT 25 UNITS SUBCUTANEOUS DAILY (TO REPLACE LANTUS) 15 mL 1     insulin pen needle (B-D U/F) 31G X 8 MM Use 4 times daily (with Lantus and Novolog) or as directed. 400 each 3     ketoconazole (NIZORAL) 2 % external cream Apply topically 2 times daily 60 g 1     lidocaine (LIDODERM) 5 % Patch Apply patch to painful area for up to 12 h within a 24 h period.  Remove after 12 hours. 30 patch 0     lisinopril (PRINIVIL/ZESTRIL) 2.5 MG tablet Take 1 tablet (2.5 mg) by mouth daily 90 tablet 2     metFORMIN (GLUCOPHAGE-XR) 500 MG 24 hr tablet TAKE 2 TABLETS BY MOUTH DAILY WITH DINNER 180 tablet 0     metoprolol succinate (TOPROL XL) 25 MG 24 hr tablet Take 0.5 tablets (12.5 mg) by mouth 2 times daily Take 50 mg by mouth in combination with 12.5  for a total of 62.5 twice a day 90 tablet 3     metoprolol succinate (TOPROL-XL) 100 MG 24 hr tablet Take 50 mg by mouth in combination with 12.5 for a total of 62.5 twice a day 90 tablet 3     montelukast (SINGULAIR) 10 MG tablet TAKE 1 TABLET BY MOUTH EVERYDAY AT BEDTIME 90 tablet 0     NOVOLOG FLEXPEN 100 UNIT/ML soln INJECT 12 UNITS SUBCUTANEOUS 3 TIMES DAILY (WITH MEALS) 15 mL 1     NOVOLOG FLEXPEN 100 UNIT/ML soln INJECT 12 UNITS SUBCUTANEOUS 3 TIMES DAILY (WITH MEALS) 15 mL 1     order for DME Equipment being ordered: Oxygen  Pulsed oxygen portable tank  Rate: 4LNC  Diagnosis: ILD  Duration: Lifetime -99 1 Device 1     order for DME Equipment being ordered: Full face mask for CPAP - size: F10 large 1 each 0     order for DME Oxygen: Patient requires supplemental Oxygen 4 LPM via nasal canula with activity. Please provide patient with a home unit and with portability capability. Oxygen will be for a lifetime. 1 Device 0     potassium chloride SA (K-DUR/KLOR-CON M) 20 MEQ CR tablet Take 2 tabs (40 meq) in am and 1 tab (20 meq) in pm daily 270 tablet 3     predniSONE (DELTASONE) 1 MG tablet Use with 5 mg tabs to taper: 10 mg and 9 mg every other day for 1m, 9 mg/day for 1m, 9 mg and 8 mg every other day for 1m, etc. until at 7mg 120 tablet 6     predniSONE (DELTASONE) 5 MG tablet Take 5 mg by mouth daily. 60 tablet 0     spironolactone (ALDACTONE) 25 MG tablet Take 2 tablets (50 mg) by mouth daily 180 tablet 3     tiZANidine (ZANAFLEX) 2 MG tablet Take 1-2 tablets (2-4 mg) by mouth 3 times daily 90 tablet 1     torsemide (DEMADEX) 20 MG tablet Take 40 mg in the morning, 30 mg in the afternoon. 105 tablet 3     traZODone (DESYREL) 50 MG tablet Take 0.5-1 tablets (25-50 mg) by mouth nightly as needed for sleep 30 tablet 5     warfarin (COUMADIN) 7.5 MG tablet TAKE 1 TABLET BY MOUTH DAILY. CURRENT DOSE IS 7.5 MG DAILY. DOSE ADJUSTED PER INR RESULT. 90 tablet 3       ROS:   Constitutional: No fever, chills. No  "weight gain/loss.   ENT: eye hematoma ear ache, epistaxis, sore throat.   Allergies/Immunologic: Negative.   Respiratory: + intermittent cough, no hemoptysis.   Cardiovascular: As per HPI.   GI: No nausea, vomiting, hematemesis, melena, or hematochezia.   : No urinary frequency, dysuria, or hematuria.   Integument: Negative.   Psychiatric: Negative.   Neuro: Negative.   Endocrinology: Negative.   Musculoskeletal: Negative.    EXAM:  BP 91/62 (BP Location: Right arm, Patient Position: Chair, Cuff Size: Adult Large)   Pulse 67   Ht 1.702 m (5' 7\")   Wt 95.7 kg (211 lb)   SpO2 99%   BMI 33.05 kg/m     General: appears comfortable, alert and articulate  Head: normocephalic, atraumatic  Eyes: R eye with conjunctival hemorrhage, vision intact    Neck: no adenopathy  Orophyarynx: moist mucosa, no lesions, dentition intact  Heart: Irregular, S1/S2, no murmur, gallop, rub, estimated JVP <10   Lungs: diffuse expiratory wheezing, no crackles,no dullness   Abdomen: soft, non-tender, bowel sounds present, no hepatosplenomegaly  Extremities: trace LE edema, no clubbing, cyanosis.  Neurological: normal speech and affect, no gross motor deficits    Labs:  CBC RESULTS:  Lab Results   Component Value Date    WBC 15.6 (H) 11/08/2017    RBC 4.59 11/08/2017    HGB 13.4 11/08/2017    HCT 42.1 11/08/2017    MCV 92 11/08/2017    MCH 29.2 11/08/2017    MCHC 31.8 11/08/2017    RDW 16.7 (H) 11/08/2017     11/08/2017       CMP RESULTS:  Lab Results   Component Value Date     02/13/2019    POTASSIUM 4.3 02/13/2019    CHLORIDE 104 02/13/2019    CO2 25 02/13/2019    ANIONGAP 8 02/13/2019     (H) 02/13/2019    BUN 24 02/13/2019    CR 0.87 02/13/2019    GFRESTIMATED 64 02/13/2019    GFRESTBLACK 74 02/13/2019    CLAUDY 9.3 02/13/2019    BILITOTAL 0.7 05/23/2018    ALBUMIN 3.3 (L) 05/23/2018    ALKPHOS 71 05/23/2018    ALT 21 05/23/2018    AST 15 05/23/2018        INR RESULTS:  Lab Results   Component Value Date    INR 2.5 (A) " 11/28/2018    INR 2.08 (H) 10/22/2018       Lab Results   Component Value Date    MAG 2.0 04/11/2017     Lab Results   Component Value Date    NTBNPI 3,531 (H) 04/06/2017     Lab Results   Component Value Date    NTBNP 804 (H) 04/11/2018     HgA1c 7% Sept 2016    ECG :10/26/16 atrial fibrillation HR 72    48hr holter July 2016- generally controlled atrial fibrillation    July 2016 Complete Portable Echo Adult. Contrast Optison. Optison (NDC #2325-5848-32)  given intravenously. Patient was given 4 ml mixture of 3 ml Optison and 6 ml  saline. 5 ml wasted. Interpretation Summary  Atrial fibrillation  Left ventricular function, chamber size, wall motion, and wall thickness are  normal.The EF is 60-65%. Normal LV size and wall thickness, mild bilateral atrial enlargement  PA pressure cannot be determined  Estimated RA pressure 8 mmHg    Reg[place]osen MPI 2014: no fixed or inducible defects    Assessment and Plan:  In summary this is a very pleasant 76 year old female with suspected HFpEF who is referred today for further evaluation and treatment.     In support of the diagnosis of HFpEF includes mild LA enlargement, echocardiographic signs of increase LV filling pressures, increased nt-bnp, as well as a diuretic requirement and symptomatic improvement on diuretics.    The risk factors for HFpEF in her include age, gender, HTN, pre-diabetes, sleep apnea and obesity.  A prior stress test was negative for CAD. She is presently euvolemic on exam and is NYHA class III (mutifactorial) I am increasing her diuretics as listed below.     The mainstays of therapy for HFpEF include volume management, blood pressure control, treating underlying sleep apnea if present, weight management, exercise training, rate control for atrial fibrillation as well as consideration for a rhythm control strategy, as well as consideration for clinical trials.  I do have her on spironolactone given the results of the TOPCAT trial. Patients have  symptomatically felt very improved on this medication. Her a fib rates have been under better control recently and her ischemia evaluation is negative.     Plan for HFpEF: continue current dose of diuretics, BP is controlled, volume status is euvolemic     Other:   Hypertension: if anything her blood pressure is too well controlled, stopping lisinopril today        Af fib:- atrial fibrillation [id number] (age >75, HTN, CHF, gender) - rates controlled on metoprolol , I am changing her to a NOAC today given patient preference.     Primary prevention: on statin, no ASA given she is on anti-coagulation     Eye hemorrhage: per optho- INR was within range.     We look forward to seeing Sister Sita in 6 months.     Radha Rosa  [facility] Cardiology      Director,  [facility] HFpEF Program       CC  Patient Care Team:  Ilene Tristan MD as PCP - General  Ilene Tristan MD as PCP - Assigned PCP  Kirill Polk MD as MD (Otolaryngology)  Aubrey Hurtado MD as MD (Cardiology)  Jasvir Franco MD as Resident (Internal Medicine)  Danay Payne, JASON as Nurse Coordinator (Clinical Cardiac Electrophysiology)  Anderson Perez MD as MD (Clinical Cardiac Electrophysiology)  Radha Rosa MD as MD (Cardiology)  Kiesha Elias APRN CNP as Nurse Practitioner (Cardiology)  Beatriz Blanco, RN as Nurse Coordinator (Cardiology)  Shante Nash MD as Resident (Student in organized health care education/training program)  Jen Clark MD as Fellow (Student in organized health care education/training program)  Carmenza Stringer MD as MD (Rheumatology)  KIESHA ELIAS

## 2019-02-17 DIAGNOSIS — E11.9 TYPE 2 DIABETES MELLITUS WITHOUT COMPLICATION, WITHOUT LONG-TERM CURRENT USE OF INSULIN (H): ICD-10-CM

## 2019-02-18 RX ORDER — BLOOD SUGAR DIAGNOSTIC
STRIP MISCELLANEOUS
Qty: 400 STRIP | Refills: 0 | Status: SHIPPED | OUTPATIENT
Start: 2019-02-18 | End: 2019-06-10

## 2019-02-18 NOTE — TELEPHONE ENCOUNTER
"Prescription approved per [facility] [place].  Sophia Hemphill, RN    Requested Prescriptions   Signed Prescriptions Disp Refills     ACCU-CHEK SHANNON PLUS test strip 400 strip 0     Sig: USE TO TEST BLOOD SUGARS 4 TIMES DAILY OR AS DIRECTED    Diabetic Supplies Protocol Passed - 2/17/2019  8:30 AM       Passed - Medication is active on med list       Passed - Patient is 18 years of age or older       Passed - Recent (6 mo) or future (30 days) visit within the authorizing provider's specialty    Patient had office visit in the last 6 months or has a visit in the next 30 days with authorizing provider.  See \"Patient Info\" tab in inbasket, or \"Choose Columns\" in Meds & Orders section of the refill encounter.              "

## 2019-02-19 ENCOUNTER — TELEPHONE (OUTPATIENT)
Dept: FAMILY MEDICINE | Facility: CLINIC | Age: 79
End: 2019-02-19

## 2019-02-20 ENCOUNTER — TELEPHONE (OUTPATIENT)
Dept: CARDIOLOGY | Facility: CLINIC | Age: 79
End: 2019-02-20

## 2019-02-20 NOTE — TELEPHONE ENCOUNTER
[facility] Call Center    Phone Message    May a detailed message be left on voicemail: yes    Reason for Call: Other: Guadalupe from [facility] calling. She states that pt was prescribed Xarelto on 2/14 by Dr. Rosa but has still been on warfarin even though it was noted in her AVS that she should stop taking it. Pt has concerns, however, that the reason why she needed to stop taking both warfarin and lisinopril were not documented in the office notes and Guadalupe is suggesting that the office notes be addended to reflect the reason why these medications were discontinued.     Action Taken: Message routed to:  [facility] ([facility]  CARDIOVASCULAR CTR

## 2019-02-20 NOTE — TELEPHONE ENCOUNTER
CW received INR result from cardiology   INR 2.37 on 2/14/2019  Need to connect with pt - previous INR Nov 2018  Left message for patient to callback.  Lydia HALEY RN

## 2019-02-20 NOTE — TELEPHONE ENCOUNTER
CW,   FYI:  Spoke with Rosa cardiology nurse  She states cardiology notes appear incomplete per her too   No documentation as to why anticoagulant changed  Not documented if pt was informed of change and if informed Warfarin to be d/c'd   States pt was given AVS that documents changes  Pt could have left AVS at office though or not read, etc  Unclear that changes told to pt  No documentation for PCP to review as to why changes made too   She will contact cardiology provider to get notes addended  Lydia HALEY RN

## 2019-02-20 NOTE — TELEPHONE ENCOUNTER
CW,   Called pt about INR  Has been taking Warfarin 7.5mg daily   Told her to continue on same dose - will recheck in 1 month    States she wanted you to know she had a flaming red eye again (hemorrhage) last week  States this is the 3rd time this has happened  Went to eye doctor 2/13/2019  Went to cardiologist after eye doctor appt   States cardiologist stopped her Lisinopril and added Xarelto    Told pt Xarelto should be an alternative to Warfarin and not be an addition to her med list  No mention of Xarelto being started at 2/14/2019 cardiology appt   Reviewed Reconcile Dispensed tab in EPIC  She filled 90 day supply though Thursday 2/14    Told pt at this time to just be on Xarelto - stop Warfarin     At 3am last night woke up with bad nosebleed  Concerned about bleeding symptoms she's having  Would like to discuss with you further at [facility]  Lydia HALEY RN

## 2019-02-21 NOTE — TELEPHONE ENCOUNTER
Yes, the note was definitely addended to add the NOAC information and that lisinopril was being stopped- this was not in the note yesterday, but great that it is now consistent with the plan for the pt so we are on the same page as well.     It looks like from their note on 2/21/19 they thought Sister Sita was aware of the changes, but from your discussion with her, that was not the case, correct?  That she was taking both the coumadin and the xarelto this week?    Could you call pt one more time and just make sure that she has the correct plan?    Thanks so much,  CW

## 2019-02-21 NOTE — TELEPHONE ENCOUNTER
"CW,   FYI:  Carolin from cardiology office called   States there is documentation in cardiology notes about switching pt to NOAC and stopping Lisinopril  I combed though notes yesterday and did Ctrl+F - could find the words \"Lisinopril\" \"Xarelto\" \"Anticoagulant\", etc  Not sure if notes were just addended yesterday?  She said pt took AVS with her and should have known of changes  State pt told her she just filled Warfarin so was going to continue that for the next week then start Xarelto  Lydia HALEY RN    "

## 2019-02-21 NOTE — TELEPHONE ENCOUNTER
Called Guadalupe back, it is documented in the patient's note that Lisinopril is being discontinued and NOAC is being started in place of Warfarin.  Also at clinic visit, I had discussed with Sister Randal and her friend the medication changes and the follow up.    The opted to make the mediaction changes starting the Monday after the visit as they already had the pill box set up and felt it would be hard to try to take out the medications.

## 2019-02-22 NOTE — TELEPHONE ENCOUNTER
Prescription printed out.  Found on 2/22/19. Prescription called into pharmacy, spoke with [facility] pharmacist.    Kamille Ruffin RN  [facility]

## 2019-02-22 NOTE — TELEPHONE ENCOUNTER
CW,  FYI:  Patient states she is no longer on Warfarin  Will have to ask friend at [facility] if pt overlapped with Xarelto   Sounds like maybe friend was on top of it and there wasn't an overlap per pt?  Lydia HALEY RN

## 2019-02-26 ENCOUNTER — OFFICE VISIT (OUTPATIENT)
Dept: FAMILY MEDICINE | Facility: CLINIC | Age: 79
End: 2019-02-26
Payer: MEDICARE

## 2019-02-26 VITALS
WEIGHT: 212 LBS | HEIGHT: 67 IN | TEMPERATURE: 98.6 F | BODY MASS INDEX: 33.27 KG/M2 | SYSTOLIC BLOOD PRESSURE: 110 MMHG | RESPIRATION RATE: 18 BRPM | DIASTOLIC BLOOD PRESSURE: 76 MMHG | OXYGEN SATURATION: 94 % | HEART RATE: 78 BPM

## 2019-02-26 DIAGNOSIS — Z79.01 LONG TERM CURRENT USE OF ANTICOAGULANT THERAPY: ICD-10-CM

## 2019-02-26 DIAGNOSIS — I82.401 DEEP VEIN THROMBOSIS (DVT) OF RIGHT LOWER EXTREMITY, UNSPECIFIED CHRONICITY, UNSPECIFIED VEIN (H): ICD-10-CM

## 2019-02-26 DIAGNOSIS — K92.2 LOWER GI BLEED: ICD-10-CM

## 2019-02-26 DIAGNOSIS — M53.3 SACRAL BACK PAIN: ICD-10-CM

## 2019-02-26 DIAGNOSIS — K21.9 GASTROESOPHAGEAL REFLUX DISEASE WITHOUT ESOPHAGITIS: ICD-10-CM

## 2019-02-26 DIAGNOSIS — M35.02 SJOGREN'S SYNDROME WITH LUNG INVOLVEMENT (H): ICD-10-CM

## 2019-02-26 DIAGNOSIS — J30.89 SEASONAL ALLERGIC RHINITIS DUE TO OTHER ALLERGIC TRIGGER: Primary | ICD-10-CM

## 2019-02-26 DIAGNOSIS — I10 ESSENTIAL HYPERTENSION WITH GOAL BLOOD PRESSURE LESS THAN 140/90: ICD-10-CM

## 2019-02-26 DIAGNOSIS — G47.33 OSA (OBSTRUCTIVE SLEEP APNEA): ICD-10-CM

## 2019-02-26 DIAGNOSIS — Z79.4 TYPE 2 DIABETES MELLITUS WITH HYPERGLYCEMIA, WITH LONG-TERM CURRENT USE OF INSULIN (H): ICD-10-CM

## 2019-02-26 DIAGNOSIS — E11.65 TYPE 2 DIABETES MELLITUS WITH HYPERGLYCEMIA, WITH LONG-TERM CURRENT USE OF INSULIN (H): ICD-10-CM

## 2019-02-26 DIAGNOSIS — I48.21 PERMANENT ATRIAL FIBRILLATION (H): ICD-10-CM

## 2019-02-26 DIAGNOSIS — M19.90 INFLAMMATORY ARTHRITIS: ICD-10-CM

## 2019-02-26 DIAGNOSIS — M25.50 ARTHRALGIA, UNSPECIFIED JOINT: ICD-10-CM

## 2019-02-26 PROCEDURE — 99214 OFFICE O/P EST MOD 30 MIN: CPT | Performed by: FAMILY MEDICINE

## 2019-02-26 RX ORDER — LIDOCAINE 50 MG/G
PATCH TOPICAL
Qty: 30 PATCH | Refills: 5 | Status: SHIPPED | OUTPATIENT
Start: 2019-02-26 | End: 2020-01-21

## 2019-02-26 RX ORDER — LORATADINE 10 MG/1
10 TABLET ORAL DAILY
Qty: 90 TABLET | Refills: 1 | Status: SHIPPED | OUTPATIENT
Start: 2019-02-26 | End: 2019-08-20

## 2019-02-26 ASSESSMENT — MIFFLIN-ST. JEOR: SCORE: 1474.26

## 2019-02-26 NOTE — PROGRESS NOTES
SUBJECTIVE:   Betty Tee is a 78 year old female who presents to clinic today for the following health issues:  Follow up on various issues.     Pt concerns-  --Allergy meds- singulair not helping a lot.  Also taking oral med and nasal spray daily.  Taking singulair nightly.  She's still sneezing a lot, nasal dripping.  She did get a cold within the last week, so not counting those sx's.    --Upset stomach- wondering about pantoprazole, stopped in 3/18, wondering if she should go back on?  For awhile, she was taking a lot of tums, couple of months, until very recently.    --CPAP f/u- [facility], 2016, sleep hypoventilation, no f/u since- will refer back for recheck.    --Lidocaine patches for pain/inflammation- really helpful for pain muscle spasms, in hip, leg, back, shoulders.   Come on for 2-3 days.    --Inflam jt disease- went down to prednisone 7mg/d, but she was having more often than not joint (migrating), so just couple days ago, went to alternating dosing of 7mg/8mg.  They did not let rheum know- will call Dr. Hess.  Last seen in 12/18, f/u q3 months.    --Cardiology f/u- switched coumadin to xarelto.  No overlap with those meds.  Also stopped lisinopril due to her lower BP.  BP still looks good today.  F/u in 6 months.    --Pulmonary rehab- 9 wks, felt like it helped, graduated.  Appt coming up in the next couple weeks- Dr. Tillman.      DM- A1C last done in 10/18, 6.7.  Checking sugars- 3x/day.  Ranging from 110s to 160, rare low of ~90, rare high of into 200s.  Taking insulin 3x/day.  AM- basaglar 25 units   plus 12 units novolog with meals 3x/day.        Problem list and histories reviewed & adjusted, as indicated.  Additional history: as documented      Patient Active Problem List   Diagnosis     Temporomandibular joint disorder     Diverticulitis of colon     Disorder of bone and cartilage     Osteoarthritis     Alcohol abuse, in remission     Restless legs syndrome (RLS)     Major  depressive disorder, recurrent episode, moderate (H)     Essential hypertension with goal blood pressure less than 140/90     Advanced directives, counseling/discussion     Colouterine fistula     Permanent atrial fibrillation (H)     Left ventricular hypertrophy     Health Care Home     Insomnia     Joint pains     Allergic reaction caused by a drug - likely plaquenil     Breast fibroadenoma     DVT (deep venous thrombosis) (H)     Fatty liver disease, nonalcoholic     Rib pain     Neck mass     Gastroesophageal reflux disease without esophagitis     Enlarged lymph node     Sjogren's syndrome with lung involvement (H)     ANGELICA (obstructive sleep apnea)     ILD (interstitial lung disease) (H)     Lower GI bleed     Acute and chronic respiratory failure with hypoxia (H)     (HFpEF) heart failure with preserved ejection fraction (H)     Type 2 diabetes mellitus with hyperglycemia, with long-term current use of insulin (H)     Heart failure, chronic, with acute decompensation (H)     Hyperlipidemia LDL goal <100     Long term current use of anticoagulant therapy     Inflammatory arthritis     Follicular bronchiolitis (H)     Past Surgical History:   Procedure Laterality Date     BACK SURGERY  1962     BIOPSY BREAST  9/27/02    Biopsy Left Breast     C APPENDECTOMY  1970's?     C NONSPECIFIC PROCEDURE  11/05    exploratory abd lap, adhesions, resolved RLQ pain, diverticulitis episodes     CARDIAC SURGERY       CHOLECYSTECTOMY  1990's?     COLECTOMY LEFT  11/7/2011    Procedure:COLECTOMY LEFT; Laparoscopic mobilization of splenic flexture, sigmoid colectomy, coloprotoscopy, loop illeostomy; Surgeon:CK CASTANEDA; Location:[facility]     HYSTERECTOMY TOTAL ABDOMINAL, BILATERAL SALPINGO-OOPHORECTOMY, COMBINED  11/7/2011    Procedure:COMBINED HYSTERECTOMY TOTAL ABDOMINAL, BILATERAL SALPINGO-OOPHORECTOMY; total abdominal hysterectomy, bilateral salpingo-oophorectomy; Surgeon:ALETA MANUEL; Location:[facility]     INSERT STENT URETER   2011    Procedure:INSERT STENT URETER; Placement of Bilateral Ureteral Stents ; Surgeon:PRANEETH BRYANT; Location:[facility]     SIGMOIDOSCOPY FLEXIBLE  11/3/2011    Procedure:SIGMOIDOSCOPY FLEXIBLE; Flexible Sigmoidoscopy; Surgeon:CK CASTANEDA; Location:[facility]     TAKEDOWN ILEOSTOMY  2012    Procedure:TAKEDOWN ILEOSTOMY; Takedown Loop Ileostomy ; Surgeon:CK CASTANEDA; Location:[facility]       Social History     Tobacco Use     Smoking status: Former Smoker     Packs/day: 0.50     Years: 10.00     Pack years: 5.00     Types: Cigarettes     Last attempt to quit: 2011     Years since quittin.6     Smokeless tobacco: Never Used     Tobacco comment:  ppd   Substance Use Topics     Alcohol use: No     Alcohol/week: 0.0 oz     Comment: In recovery beginning      Family History   Problem Relation Age of Onset     C.A.D. Mother 63        MI- first at age 63     Heart Disease Mother      Hypertension Mother      Cerebrovascular Disease Mother      Hyperlipidemia Mother      Alcohol/Drug Father      Alzheimer Disease Father      Dementia Father      Hypertension Father      Hyperlipidemia Father      Diabetes Sister      C.A.D. Sister 52        Minor MI- age 50's     Heart Disease Sister      Hypertension Sister      Hypertension Sister      Hypertension Brother      Cancer - colorectal Sister 48        Late 40's early 50's     Prostate Cancer Brother 74        Dx'd age 74     Gastrointestinal Disease Sister         Diverticulitis     Gastrointestinal Disease Brother         Diverticulitis     Lipids Sister      Lipids Sister      Parkinsonism Brother      Diabetes Sister      Heart Disease Sister         CHF     Cancer Sister         lung, smoker     Substance Abuse Sister      Substance Abuse Brother      Asthma Sister      Cancer Sister      Breast Cancer Daughter      Prostate Cancer Brother      Hyperlipidemia Brother      Diabetes Other      Hypertension Other          Current Outpatient  Medications   Medication Sig Dispense Refill     ACCU-CHEK SHANNON PLUS test strip USE TO TEST BLOOD SUGARS 4 TIMES DAILY OR AS DIRECTED 400 strip 0     acetaminophen (TYLENOL) 500 MG tablet Take 500 mg by mouth nightly as needed        acyclovir (ZOVIRAX) 400 MG tablet Take 1 tablet (400 mg) by mouth 3 times daily For a couple days 15 tablet 2     acyclovir (ZOVIRAX) 5 % external ointment Apply topically 6 times daily As needed for outbreaks 15 g 3     albuterol (PROAIR HFA, PROVENTIL HFA, VENTOLIN HFA) 108 (90 BASE) MCG/ACT inhaler Inhale 2 puffs into the lungs every 6 hours 1 Inhaler 3     atorvastatin (LIPITOR) 10 MG tablet TAKE 1 TABLET BY MOUTH EVERY DAY 90 tablet 0     BD JANE U/F 32G X 4 MM insulin pen needle USE 4 DAILY AS DIRECTED. 400 each 1     blood glucose monitoring (ACCU-CHEK SHANNON PLUS) test strip Use to test blood sugar 4 times daily or as directed.  Ok to substitute alternative if insurance prefers. 120 strip 11     blood glucose monitoring (ACCU-CHEK FASTCLIX) lancets Use to test blood sugar 4 times daily or as directed.  Ok to substitute alternative if insurance prefers. 1 Box 11     blood glucose monitoring (ACCU-CHEK MULTICLIX) lancets Use to test blood sugar 4 times daily or as directed. 4 Box 3     COMPRESSION STOCKINGS Wear compression stockings in affected leg (right leg) or both legs most of the time during the day and take them off at night. 2 each 2     fluticasone (FLONASE) 50 MCG/ACT nasal spray Spray 1-2 sprays into both nostrils daily as needed for allergies 1 Bottle 3     fluticasone (FLOVENT HFA) 220 MCG/ACT inhaler Inhale 2 puffs into the lungs 2 times daily 3 Inhaler 3     insulin glargine (BASAGLAR KWIKPEN) 100 UNIT/ML pen INJECT 25 UNITS SUBCUTANEOUS DAILY (TO REPLACE LANTUS) 15 mL 1     insulin pen needle (B-D U/F) 31G X 8 MM Use 4 times daily (with Lantus and Novolog) or as directed. 400 each 3     ketoconazole (NIZORAL) 2 % external cream Apply topically 2 times daily 60 g 1      lidocaine (LIDODERM) 5 % patch Apply patch to painful area for up to 12 h within a 24 h period.  Remove after 12 hours. 30 patch 5     loratadine (CLARITIN) 10 MG tablet Take 1 tablet (10 mg) by mouth daily 90 tablet 1     metoprolol succinate (TOPROL XL) 25 MG 24 hr tablet Take 0.5 tablets (12.5 mg) by mouth 2 times daily Take 50 mg by mouth in combination with 12.5 for a total of 62.5 twice a day 90 tablet 3     metoprolol succinate (TOPROL-XL) 100 MG 24 hr tablet Take 50 mg by mouth in combination with 12.5 for a total of 62.5 twice a day 90 tablet 3     montelukast (SINGULAIR) 10 MG tablet TAKE 1 TABLET BY MOUTH EVERYDAY AT BEDTIME 90 tablet 0     NOVOLOG FLEXPEN 100 UNIT/ML soln INJECT 12 UNITS SUBCUTANEOUS 3 TIMES DAILY (WITH MEALS) 15 mL 1     NOVOLOG FLEXPEN 100 UNIT/ML soln INJECT 12 UNITS SUBCUTANEOUS 3 TIMES DAILY (WITH MEALS) 15 mL 1     order for DME Equipment being ordered: Oxygen  Pulsed oxygen portable tank  Rate: 4LNC  Diagnosis: ILD  Duration: Lifetime -99 1 Device 1     order for DME Equipment being ordered: Full face mask for CPAP - size: F10 large 1 each 0     order for DME Oxygen: Patient requires supplemental Oxygen 4 LPM via nasal canula with activity. Please provide patient with a home unit and with portability capability. Oxygen will be for a lifetime. 1 Device 0     potassium chloride SA (K-DUR/KLOR-CON M) 20 MEQ CR tablet Take 2 tabs (40 meq) in am and 1 tab (20 meq) in pm daily 270 tablet 3     predniSONE (DELTASONE) 1 MG tablet Use with 5 mg tabs to taper: 10 mg and 9 mg every other day for 1m, 9 mg/day for 1m, 9 mg and 8 mg every other day for 1m, etc. until at 7mg 120 tablet 6     predniSONE (DELTASONE) 5 MG tablet Take 7 mg a day, use with 1 mg tablets. (Patient taking differently: 8 mg Take 7 mg a day, use with 1 mg tablets.) 60 tablet 3     predniSONE (DELTASONE) 5 MG tablet Take 5 mg by mouth daily. (Patient taking differently: Take 7 mg by mouth daily .) 60 tablet 0      rivaroxaban ANTICOAGULANT (XARELTO) 20 MG TABS tablet Take 1 tablet (20 mg) by mouth daily (with dinner) 90 tablet 3     spironolactone (ALDACTONE) 25 MG tablet Take 2 tablets (50 mg) by mouth daily 180 tablet 3     traZODone (DESYREL) 50 MG tablet Take 0.5-1 tablets (25-50 mg) by mouth nightly as needed for sleep 30 tablet 5     alendronate (FOSAMAX) 70 MG tablet Take 1 tablet (70 mg) by mouth every 7 days (Patient not taking: Reported on 2/14/2019) 4 tablet 11     azithromycin (ZITHROMAX) 250 MG tablet Take 1 tablet (250 mg) by mouth daily 30 tablet 11     BASAGLAR 100 UNIT/ML injection Inject 25 Units Subcutaneous daily 7.5 mL 2     escitalopram (LEXAPRO) 20 MG tablet TAKE 1 TABLET BY MOUTH EVERY DAY 90 tablet 0     gabapentin (NEURONTIN) 300 MG capsule Take 1 capsule (300 mg) by mouth At Bedtime (Patient not taking: Reported on 2/14/2019) 30 capsule 0     Humidifier MISC Continuous humidified air via concentrator.   Diagnosis: ILD  Duration: Lifetime 99. (Patient not taking: Reported on 2/14/2019) 1 each 1     metFORMIN (GLUCOPHAGE-XR) 500 MG 24 hr tablet TAKE 2 TABLETS BY MOUTH DAILY WITH DINNER 180 tablet 0     order for DME Please provide patient with a POC.  Okay to test patient on POC to maintain oxygen saturations above 90%.   Patient currently uses 2 to 3 LPM oxygen with activity. 1 Device 0     tiZANidine (ZANAFLEX) 2 MG tablet Take 1-2 tablets (2-4 mg) by mouth 3 times daily (Patient not taking: Reported on 2/14/2019) 90 tablet 1     torsemide (DEMADEX) 10 MG tablet Take one tab (10 mg) with one 20 mg tab to = 30 mg daily in afternoon. 90 tablet 3     torsemide (DEMADEX) 20 MG tablet Take 2 tabs (40 mg) in am daily 105 tablet 10     Allergies   Allergen Reactions     Amoxicillin-Pot Clavulanate      Augmentin Nausea and Vomiting     Codeine Nausea and Vomiting     Codeine      Phenobarbital Itching     Phenobarbital      Seasonal Allergies      Recent Labs   Lab Test 02/13/19  1523 10/22/18  1246  "10/02/18  1524 05/23/18  1028  02/21/18  1230  01/31/18  1428 11/08/17  1432  10/06/17  1421  08/01/17  1146  06/22/17  1700   A1C  --   --  6.7* 6.9*  --   --   --  7.0*  --   --  7.3*  --   --    < >  --    LDL  --   --   --  21  --  5  --   --   --   --   --   --   --   --  13   HDL  --   --   --  53  --  64  --   --   --   --   --   --   --   --  62   TRIG  --   --   --  113  --  188*  --   --   --   --   --   --   --   --  132   ALT  --   --   --  21  --   --   --   --   --   --  43  --  45  --   --    CR 0.87 0.97  --  0.90   < > 0.88   < > 0.96  --    < > 0.94   < > 0.90   < > 0.80   GFRESTIMATED 64 55*  --  61   < > 62   < > 56*  --    < > 58*   < > 61   < > 69   GFRESTBLACK 74 67  --  74   < > 76   < > 68  --    < > 70   < > 74   < > 84   POTASSIUM 4.3 4.2  --  3.5   < > 3.9   < > 4.5  --    < > 4.3   < > 3.5   < > 3.0*   TSH  --   --   --  2.42  --   --   --   --  1.40  --   --   --   --   --   --     < > = values in this interval not displayed.      BP Readings from Last 3 Encounters:   03/01/19 122/68   02/26/19 110/76   02/14/19 91/62    Wt Readings from Last 3 Encounters:   03/01/19 96.2 kg (212 lb)   02/26/19 96.2 kg (212 lb)   02/14/19 95.7 kg (211 lb)                  Labs reviewed in EPIC    Reviewed and updated as needed this visit by clinical staff  Tobacco  Allergies  Meds  Problems  Med Hx  Surg Hx  Fam Hx       Reviewed and updated as needed this visit by Provider  Tobacco  Allergies  Meds  Problems  Med Hx  Surg Hx  Fam Hx         ROS:  Constitutional, HEENT, cardiovascular, pulmonary, GI, , musculoskeletal, neuro, skin, endocrine and psych systems are negative, except as otherwise noted.    OBJECTIVE:     /76 (BP Location: Left arm, Patient Position: Sitting, Cuff Size: Adult Large)   Pulse 78   Temp 98.6  F (37  C) (Oral)   Resp 18   Ht 1.702 m (5' 7\")   Wt 96.2 kg (212 lb)   SpO2 94%   BMI 33.20 kg/m    Body mass index is 33.2 kg/m .  GENERAL: healthy, alert " and no distress  EYES: Eyes grossly normal to inspection, PERRL and conjunctivae and sclerae normal  HENT: ear canals and TM's normal, nose and mouth without ulcers or lesions  NECK: no adenopathy, no asymmetry, masses, or scars and thyroid normal to palpation  RESP: lungs clear to auscultation - no rales, rhonchi or wheezes  CV: regular rate and rhythm, normal S1 S2, no S3 or S4, no murmur, click or rub, no peripheral edema and peripheral pulses strong  MS: no gross musculoskeletal defects noted, no edema  SKIN: no suspicious lesions or rashes  NEURO: Normal strength and tone, mentation intact and speech normal  PSYCH: mentation appears normal, affect normal/bright      ASSESSMENT/PLAN:     1. Seasonal allergic rhinitis due to other allergic trigger  Currently taking singulair and steroid nasal spray, but not an anti-histamine other than prn benadryl.  Will have her add claritin daily.  If much better control, can consider trying off singulair in future.  - loratadine (CLARITIN) 10 MG tablet; Take 1 tablet (10 mg) by mouth daily  Dispense: 90 tablet; Refill: 1    2. Sacral back pain/Arthralgia, unspecified joint  Lidoderm patch refill request- pt thinks she has other muscle/jt pains that seem different from the inflammatory joint pains that respond to the prednisone.  These seem to be 2-3 day flares, and can be in her hips, back, legs, shoulders, and respond well to the lidocaine patches.  She is aware that they are often not covered- willing to pay out of pocket for them.  Refills sent.  - lidocaine (LIDODERM) 5 % patch; Apply patch to painful area for up to 12 h within a 24 h period.  Remove after 12 hours.  Dispense: 30 patch; Refill: 5    3. Essential hypertension with goal blood pressure less than 140/90  Lisinopril recently stopped at cardiology visit due to lower BPs.  BP still low/normal today, so will cont off.  Labs as below.  - Albumin Random Urine Quantitative with Creat Ratio; Future  - **Basic  metabolic panel FUTURE anytime; Future    4. ANGELICA (obstructive sleep apnea)  BiPAP for sleep hypovolemia- dx in '16, pt doesn't think she's been seen for f/u since then- will refer back for recheck.  - SLEEP EVALUATION & MANAGEMENT REFERRAL - ADULT -[facility] [place] [facility]  [phone] (Age 2 and up); Future    5. Gastroesophageal reflux disease without esophagitis  GERD- increased tums for a couple months, after stopping PPI almost a year ago.  Now not using many tums in the last couple weeks.  Discussed she can do a two-week course of a PPI if sx's worsen like that again, but discuss if sx's not improving with that plan.    6. Inflammatory arthritis  Inflammatory Jt disease- Prednisone dosing decreased to 7mg/d, but pt had increased jt aches, so increased to 7mg alternating with 8mg just a few days ago.  Urged her to call and update rheumatology.    7. Permanent atrial fibrillation (H)/Deep vein thrombosis (DVT) of right lower extremity, unspecified chronicity, unspecified vein (H)  At recent cardiology visit, stopped coumadin and switched to xarelto.  Also stopped lisinopril for pt's low BP.  Pt relieved to be free of INR checks (though will miss nurse Sophia).  BP is still lower today, so will cont off lisinopril.  Cont f/u with cardiology.    8. Sjogren's syndrome with lung involvement (H)  Pulmonary f/u- recently completed pulmonary rehab.  Will see pulmonary for f/u in the next month.    9. Type 2 diabetes mellitus with hyperglycemia, with long-term current use of insulin (H)  DM II- last set of labs in 10/18 looked good, and home checks have been stable/good.  Rec f/u in ~2 months after getting fasting labs at prior appt for DM and chronic cares focused visit.  - Lipid panel reflex to direct LDL Fasting; Future  - **A1C FUTURE anytime; Future  - **TSH with free T4 reflex FUTURE anytime; Future  - Albumin Random Urine Quantitative with Creat Ratio; Future  - **Basic  metabolic panel FUTURE anytime; Future      Return in about 2 months (around 4/26/2019) for Routine Visit/Chronic Cares, Diabetes, with fasting labs 2-7 days prior.      Ilene Tristan MD  [facility]

## 2019-02-28 ENCOUNTER — TELEPHONE (OUTPATIENT)
Dept: FAMILY MEDICINE | Facility: CLINIC | Age: 79
End: 2019-02-28

## 2019-02-28 NOTE — TELEPHONE ENCOUNTER
Prior Authorization Retail Medication Request    Medication/Dose:lidocaine (LIDODERM) 5 % patch   ICD code (if different than what is on RX):    Previously Tried and Failed: Tylenol, Ibuprofen, Gabapentin, Cyclobenzaprine  Rationale:      Insurance Name:  Medicare Part D [id number]  Insurance ID:  [id number]      Pharmacy Information (if different than what is on RX)  Name:  [facility] [address]  Phone:  [phone]

## 2019-03-01 ENCOUNTER — OFFICE VISIT (OUTPATIENT)
Dept: PULMONOLOGY | Facility: CLINIC | Age: 79
End: 2019-03-01
Attending: INTERNAL MEDICINE
Payer: MEDICARE

## 2019-03-01 VITALS
DIASTOLIC BLOOD PRESSURE: 68 MMHG | SYSTOLIC BLOOD PRESSURE: 122 MMHG | BODY MASS INDEX: 33.27 KG/M2 | WEIGHT: 212 LBS | RESPIRATION RATE: 16 BRPM | HEIGHT: 67 IN | HEART RATE: 57 BPM | OXYGEN SATURATION: 95 %

## 2019-03-01 DIAGNOSIS — J44.89 FOLLICULAR BRONCHIOLITIS (H): ICD-10-CM

## 2019-03-01 DIAGNOSIS — J84.9 ILD (INTERSTITIAL LUNG DISEASE) (H): Primary | ICD-10-CM

## 2019-03-01 DIAGNOSIS — J84.9 ILD (INTERSTITIAL LUNG DISEASE) (H): ICD-10-CM

## 2019-03-01 DIAGNOSIS — J44.89 FOLLICULAR BRONCHIOLITIS (H): Primary | ICD-10-CM

## 2019-03-01 LAB
DLCOUNC-%PRED-PRE: 94 %
DLCOUNC-PRE: 18.77 ML/MIN/MMHG
DLCOUNC-PRED: 19.9 ML/MIN/MMHG
ERV-%PRED-PRE: 135 %
ERV-PRE: 0.4 L
ERV-PRED: 0.29 L
EXPTIME-PRE: 6.02 SEC
FEF2575-%PRED-PRE: 124 %
FEF2575-PRE: 2.08 L/SEC
FEF2575-PRED: 1.66 L/SEC
FEFMAX-%PRED-PRE: 111 %
FEFMAX-PRE: 5.81 L/SEC
FEFMAX-PRED: 5.21 L/SEC
FEV1-%PRED-PRE: 86 %
FEV1-PRE: 1.81 L
FEV1FEV6-PRE: 85 %
FEV1FEV6-PRED: 78 %
FEV1FVC-PRE: 85 %
FEV1FVC-PRED: 77 %
FEV1SVC-PRE: 80 %
FEV1SVC-PRED: 68 %
FIFMAX-PRE: 3.59 L/SEC
FRCPLETH-%PRED-PRE: 67 %
FRCPLETH-PRE: 1.89 L
FRCPLETH-PRED: 2.8 L
FVC-%PRED-PRE: 77 %
FVC-PRE: 2.13 L
FVC-PRED: 2.74 L
IC-%PRED-PRE: 67 %
IC-PRE: 1.87 L
IC-PRED: 2.79 L
RVPLETH-%PRED-PRE: 65 %
RVPLETH-PRE: 1.49 L
RVPLETH-PRED: 2.26 L
TLCPLETH-%PRED-PRE: 72 %
TLCPLETH-PRE: 3.76 L
TLCPLETH-PRED: 5.19 L
VA-%PRED-PRE: 68 %
VA-PRE: 3.36 L
VC-%PRED-PRE: 73 %
VC-PRE: 2.27 L
VC-PRED: 3.08 L

## 2019-03-01 PROCEDURE — 93010 ELECTROCARDIOGRAM REPORT: CPT | Mod: ZP | Performed by: INTERNAL MEDICINE

## 2019-03-01 PROCEDURE — G0463 HOSPITAL OUTPT CLINIC VISIT: HCPCS | Mod: ZF

## 2019-03-01 PROCEDURE — 93005 ELECTROCARDIOGRAM TRACING: CPT | Mod: ZF

## 2019-03-01 RX ORDER — AZITHROMYCIN 250 MG/1
250 TABLET, FILM COATED ORAL DAILY
Qty: 30 TABLET | Refills: 11 | Status: SHIPPED | OUTPATIENT
Start: 2019-03-01 | End: 2020-02-23

## 2019-03-01 ASSESSMENT — MIFFLIN-ST. JEOR: SCORE: 1474.38

## 2019-03-01 ASSESSMENT — PAIN SCALES - GENERAL: PAINLEVEL: NO PAIN (0)

## 2019-03-01 NOTE — NURSING NOTE
Chief Complaint   Patient presents with     Interstitial Lung Disease (ILD)     follow up      Judi Carter CMA

## 2019-03-01 NOTE — PROGRESS NOTES
[facility]    Reason for Visit  Betty Tee is a 78 year old year old female who is being seen for Interstitial Lung Disease (ILD) (follow up )    HPI    Sister Sita is a 78-year-old who has Sjogren's follicular bronchiolitis and is here for follow-up.  She had mild ILD on a previous chest CT scan, but follow-up chest CT scan last year showed improvement with no obvious ILD present on the CT scan.  The follicular bronchiolitis changes were still present.  She has been on prednisone for her Sjogren's for 2-3 years, and that is managed by Dr. Stringer in rheumatology.  Her current dose is 7 mg alternating with 8 mg.  She did try decreasing it to 7 mg daily, but she developed worse aches and so increased back to 7 alternating with 8 mg.  She denies new skin rashes.  She did finish pulmonary rehab and found it to be very helpful.  She is still doing the exercises and the weights but is not walking.  She does not have a treadmill and has not been walking outside because of the weather.  She denies paroxysmal nocturnal dyspnea.  She continues to have dyspnea on exertion, for example when she walks up one up one flight of stairs.  She is using her Flovent twice a day.  She does not use her albuterol because she does not think about it.  She uses oxygen 4 L/min.  She is wondering if there is a portable oxygen device that she could use.        Current Outpatient Medications   Medication     azithromycin (ZITHROMAX) 250 MG tablet     ACCU-CHEK SHANNON PLUS test strip     acetaminophen (TYLENOL) 500 MG tablet     acyclovir (ZOVIRAX) 400 MG tablet     acyclovir (ZOVIRAX) 5 % external ointment     albuterol (PROAIR HFA, PROVENTIL HFA, VENTOLIN HFA) 108 (90 BASE) MCG/ACT inhaler     alendronate (FOSAMAX) 70 MG tablet     atorvastatin (LIPITOR) 10 MG tablet     BASAGLAR 100 UNIT/ML injection     BD JANE U/F 32G X 4 MM insulin pen needle     blood glucose monitoring (ACCU-CHEK  SHANNON PLUS) test strip     blood glucose monitoring (ACCU-CHEK FASTCLIX) lancets     blood glucose monitoring (ACCU-CHEK MULTICLIX) lancets     COMPRESSION STOCKINGS     escitalopram (LEXAPRO) 20 MG tablet     fluticasone (FLONASE) 50 MCG/ACT nasal spray     fluticasone (FLOVENT HFA) 220 MCG/ACT inhaler     gabapentin (NEURONTIN) 300 MG capsule     Humidifier MISC     insulin glargine (BASAGLAR KWIKPEN) 100 UNIT/ML pen     insulin pen needle (B-D U/F) 31G X 8 MM     ketoconazole (NIZORAL) 2 % external cream     lidocaine (LIDODERM) 5 % patch     loratadine (CLARITIN) 10 MG tablet     metFORMIN (GLUCOPHAGE-XR) 500 MG 24 hr tablet     metoprolol succinate (TOPROL XL) 25 MG 24 hr tablet     metoprolol succinate (TOPROL-XL) 100 MG 24 hr tablet     montelukast (SINGULAIR) 10 MG tablet     NOVOLOG FLEXPEN 100 UNIT/ML soln     NOVOLOG FLEXPEN 100 UNIT/ML soln     order for DME     order for DME     order for DME     potassium chloride SA (K-DUR/KLOR-CON M) 20 MEQ CR tablet     predniSONE (DELTASONE) 1 MG tablet     predniSONE (DELTASONE) 5 MG tablet     predniSONE (DELTASONE) 5 MG tablet     rivaroxaban ANTICOAGULANT (XARELTO) 20 MG TABS tablet     spironolactone (ALDACTONE) 25 MG tablet     tiZANidine (ZANAFLEX) 2 MG tablet     torsemide (DEMADEX) 20 MG tablet     traZODone (DESYREL) 50 MG tablet     No current facility-administered medications for this visit.      Allergies   Allergen Reactions     Amoxicillin-Pot Clavulanate      Augmentin Nausea and Vomiting     Codeine Nausea and Vomiting     Codeine      Phenobarbital Itching     Phenobarbital      Seasonal Allergies      Past Medical History:   Diagnosis Date     Alcohol abuse, in remission      Allergic rhinitis, cause unspecified     allegra helps when she takes it     Antiplatelet or antithrombotic long-term use      Atrial fibrillation (H)     in hosp in 11/11 after surgery w/ fluid overload     Cardiomegaly     LVH on stress echo- cardiac w/u at [facility] ER- neg  CT scan for PE, neg stress echo in 8/06     Chest pain, unspecified      Disorder of bone and cartilage, unspecified     osteopenia (had been on prempro), improved on 6/06 dexa, stable dexa 11/10     Diverticulosis of colon (without mention of hemorrhage)     last episode yrs ago     Essential hypertension, benign      Follicular bronchiolitis (H)     associated with Sjogrens, dx by chest CT showing mosaic attenuation and air trapping     Gastro-oesophageal reflux disease      ILD (interstitial lung disease) (H)     associated with Sjogrens, also has mildly elevated IgG4, first noted on chest CT 2015 (mild changes) and also has small airways disease     Insomnia, unspecified     weaned off clonazepam     Irregular heart beat      Lumbago 7/09    MRI with DJD, now seeing Dr. Cain for sciatic sx's     Major depressive disorder, recurrent episode, moderate (H)      Obstructive sleep apnea      Osteoarthrosis, unspecified whether generalized or localized, unspecified site      Sjogren's syndrome (H)     + RG and SSA and lip bx     Sleep apnea      Tobacco use disorder     chantix in 9/07, started again in 6/08, working       Past Surgical History:   Procedure Laterality Date     BACK SURGERY  1962     BIOPSY BREAST  9/27/02    Biopsy Left Breast     C APPENDECTOMY  1970's?     C NONSPECIFIC PROCEDURE  11/05    exploratory abd lap, adhesions, resolved RLQ pain, diverticulitis episodes     CARDIAC SURGERY       CHOLECYSTECTOMY  1990's?     COLECTOMY LEFT  11/7/2011    Procedure:COLECTOMY LEFT; Laparoscopic mobilization of splenic flexture, sigmoid colectomy, coloprotoscopy, loop illeostomy; Surgeon:CK CASTANEDA; Location:[facility] OR     HYSTERECTOMY TOTAL ABDOMINAL, BILATERAL SALPINGO-OOPHORECTOMY, COMBINED  11/7/2011    Procedure:COMBINED HYSTERECTOMY TOTAL ABDOMINAL, BILATERAL SALPINGO-OOPHORECTOMY; total abdominal hysterectomy, bilateral salpingo-oophorectomy; Surgeon:ALETA MANUEL; Location:[facility] OR     INSERT STENT  URETER  2011    Procedure:INSERT STENT URETER; Placement of Bilateral Ureteral Stents ; Surgeon:PRANEETH BRYANT; Location:[facility] OR     SIGMOIDOSCOPY FLEXIBLE  11/3/2011    Procedure:SIGMOIDOSCOPY FLEXIBLE; Flexible Sigmoidoscopy; Surgeon:CK CASTANEDA; Location:[facility] OR     TAKEDOWN ILEOSTOMY  2012    Procedure:TAKEDOWN ILEOSTOMY; Takedown Loop Ileostomy ; Surgeon:CK CASTANEDA; Location:[facility]       Social History     Socioeconomic History     Marital status: Single     Spouse name: Not on file     Number of children: 0     Years of education: Ed Spec De     Highest education level: Not on file   Occupational History     Occupation: Professor     Employer: [employer]     Comment: [employer]- Education   Social Needs     Financial resource strain: Not on file     Food insecurity:     Worry: Not on file     Inability: Not on file     Transportation needs:     Medical: Not on file     Non-medical: Not on file   Tobacco Use     Smoking status: Former Smoker     Packs/day: 0.50     Years: 10.00     Pack years: 5.00     Types: Cigarettes     Last attempt to quit: 2011     Years since quittin.5     Smokeless tobacco: Never Used     Tobacco comment:  ppd   Substance and Sexual Activity     Alcohol use: No     Alcohol/week: 0.0 oz     Comment: In recovery beginning      Drug use: No     Sexual activity: No   Lifestyle     Physical activity:     Days per week: Not on file     Minutes per session: Not on file     Stress: Not on file   Relationships     Social connections:     Talks on phone: Not on file     Gets together: Not on file     Attends Zoroastrianism service: Not on file     Active member of club or organization: Not on file     Attends meetings of clubs or organizations: Not on file     Relationship status: Not on file     Intimate partner violence:     Fear of current or ex partner: Not on file     Emotionally abused: Not on file     Physically abused:  "Not on file     Forced sexual activity: Not on file   Other Topics Concern     Parent/sibling w/ CABG, MI or angioplasty before 65F 55M? Not Asked   Social History Narrative                   Family History   Problem Relation Age of Onset     C.A.D. Mother 63        MI- first at age 63     Heart Disease Mother      Hypertension Mother      Cerebrovascular Disease Mother      Hyperlipidemia Mother      Alcohol/Drug Father      Alzheimer Disease Father      Dementia Father      Hypertension Father      Hyperlipidemia Father      Diabetes Sister      C.A.D. Sister 52        Minor MI- age 50's     Heart Disease Sister      Hypertension Sister      Hypertension Sister      Hypertension Brother      Cancer - colorectal Sister 48        Late 40's early 50's     Prostate Cancer Brother 74        Dx'd age 74     Gastrointestinal Disease Sister         Diverticulitis     Gastrointestinal Disease Brother         Diverticulitis     Lipids Sister      Lipids Sister      Parkinsonism Brother      Diabetes Sister      Heart Disease Sister         CHF     Cancer Sister         lung, smoker     Substance Abuse Sister      Substance Abuse Brother      Asthma Sister      Cancer Sister      Breast Cancer Daughter      Prostate Cancer Brother      Hyperlipidemia Brother      Diabetes Other      Hypertension Other              ROS Pulmonary  A complete ROS was otherwise negative except as noted in the HPI.    Vitals: /68 (BP Location: Right arm, Patient Position: Chair, Cuff Size: Adult Large)   Pulse 57   Resp 16   Ht 1.702 m (5' 7.01\")   Wt 96.2 kg (212 lb)   SpO2 95%   BMI 33.20 kg/m      Exam:   GENERAL APPEARANCE: Well developed, well nourished, alert, and in no apparent distress.  RESP: good air flow throughout.  Few bibasilar inspiratory crackles. No rhonchi. No wheezes.  CV: Normal S1, S2, regular rhythm, normal rate. No murmur.  No LE edema.   MS: extremities normal. No clubbing. No cyanosis.  SKIN: no rash on " limited exam.  NEURO: Mentation intact, speech normal, normal gait and stance.  PSYCH: mentation appears normal. and affect normal/bright.    Results:  Recent Results (from the past 168 hour(s))   General PFT Lab (Please always keep checked)    Collection Time: 03/01/19 10:19 AM   Result Value Ref Range    FVC-Pred 2.74 L    FVC-Pre 1.69 L    FVC-%Pred-Pre 61 %    FEV1-Pre 1.26 L    FEV1-%Pred-Pre 60 %    FEV1FVC-Pred 77 %    FEV1FVC-Pre 75 %    FEFMax-Pred 5.21 L/sec    FEFMax-Pre 4.43 L/sec    FEFMax-%Pred-Pre 84 %    FEF2575-Pred 1.66 L/sec    FEF2575-Pre 0.91 L/sec    ASG1428-%Pred-Pre 54 %    ExpTime-Pre 7.82 sec    FIFMax-Pre 3.24 L/sec    VC-Pred 3.08 L    VC-Pre 1.71 L    VC-%Pred-Pre 55 %    IC-Pred 2.79 L    IC-Pre 1.63 L    IC-%Pred-Pre 58 %    ERV-Pred 0.29 L    ERV-Pre 0.07 L    ERV-%Pred-Pre 24 %    FEV1FEV6-Pred 78 %    FEV1FEV6-Pre 75 %    FRCPleth-Pred 2.80 L    FRCPleth-Pre 2.44 L    FRCPleth-%Pred-Pre 87 %    RVPleth-Pred 2.26 L    RVPleth-Pre 2.37 L    RVPleth-%Pred-Pre 104 %    TLCPleth-Pred 5.19 L    TLCPleth-Pre 4.08 L    TLCPleth-%Pred-Pre 78 %    DLCOunc-Pred 19.90 ml/min/mmHg    DLCOunc-Pre 18.08 ml/min/mmHg    DLCOunc-%Pred-Pre 90 %    VA-Pre 2.94 L    VA-%Pred-Pre 59 %    FEV1SVC-Pred 68 %    FEV1SVC-Pre 74 %   General PFT Lab (Please always keep checked)    Collection Time: 03/01/19 11:45 AM   Result Value Ref Range    FVC-Pred 2.74 L    FVC-Pre 2.13 L    FVC-%Pred-Pre 77 %    FEV1-Pre 1.81 L    FEV1-%Pred-Pre 86 %    FEV1FVC-Pred 77 %    FEV1FVC-Pre 85 %    FEFMax-Pred 5.21 L/sec    FEFMax-Pre 5.81 L/sec    FEFMax-%Pred-Pre 111 %    FEF2575-Pred 1.66 L/sec    FEF2575-Pre 2.08 L/sec    [id number]-%Pred-Pre 124 %    ExpTime-Pre 6.02 sec    FIFMax-Pre 3.59 L/sec    VC-Pred 3.08 L    VC-Pre 2.27 L    VC-%Pred-Pre 73 %    IC-Pred 2.79 L    IC-Pre 1.87 L    IC-%Pred-Pre 67 %    ERV-Pred 0.29 L    ERV-Pre 0.40 L    ERV-%Pred-Pre 135 %    FEV1FEV6-Pred 78 %    FEV1FEV6-Pre 85 %    FRCPleth-Pred  2.80 L    FRCPleth-Pre 1.89 L    FRCPleth-%Pred-Pre 67 %    RVPleth-Pred 2.26 L    RVPleth-Pre 1.49 L    RVPleth-%Pred-Pre 65 %    TLCPleth-Pred 5.19 L    TLCPleth-Pre 3.76 L    TLCPleth-%Pred-Pre 72 %    DLCOunc-Pred 19.90 ml/min/mmHg    DLCOunc-Pre 18.77 ml/min/mmHg    DLCOunc-%Pred-Pre 94 %    VA-Pre 3.36 L    VA-%Pred-Pre 68 %    FEV1SVC-Pred 68 %    FEV1SVC-Pre 80 %       I reviewed pulmonary function test that was performed today.  This shows nonspecific spirometry pattern with normal ratio and normal lung lung volumes and normal diffusion.  Spirometry shows stable FVC and minimal drop in FEV1 compared to 4 months ago.      I reviewed results with the patient.      Assessment and plan:     Sister Sita is a 78-year-old with Sjogren's follicular bronchiolitis and history of mild ILD who is here for follow-up.    1.  Sjogren's follicular bronchiolitis.  She has been on prednisone for 2-3 years for her Sjogren's, and the prednisone dose is being managed by Dr. Stringer.  Her follicular bronchiolitis appears stable with PFT suggesting obstructive lung disease.  I think it is reasonable to add daily azithromycin 250 mg as an added anti-inflammatory for her follicular bronchiolitis.  The prednisone taper likely is going to be limited by musculoskeletal symptoms, and Dr. Hernandez will manage that.  For now we will continue Flovent inhaler twice daily, but we could stop this in the future if she responds to the daily azithromycin.  Her prednisone dose will continue the same until she sees Dr. Stringer.  She will return in 3 months with full PFT.  She will see Dr[facility] Velasquez at [facility] in 6 months and continue alternating seeing me or Dr. Velasquez after that.    2.  Medication monitoring.  I will get an EKG today before starting azithromycin.  I will check another EKG in 3 months to monitor the azithromycin.  If those are normal, then she would not need further monitoring.    3.  Hypoxia.  She will continue oxygen 4  L/min.  I will ask my nurse to talk with her about portable oxygen device options and whether they would work for her.    4.  History of Sjogren's ILD.  The most recent chest CT in 2018 showed improvement in her ILD with no further ILD present.  This could be because she was treated with prednisone.

## 2019-03-01 NOTE — LETTER
3/1/2019      RE: Betty Tee  [address]  [facility] [address]       [facility]    Reason for Visit  Betty Tee is a 78 year old year old female who is being seen for Interstitial Lung Disease (ILD) (follow up )    HPI    Sister Sita is a 78-year-old who has Sjogren's follicular bronchiolitis and is here for follow-up.  She had mild ILD on a previous chest CT scan, but follow-up chest CT scan last year showed improvement with no obvious ILD present on the CT scan.  The follicular bronchiolitis changes were still present.  She has been on prednisone for her Sjogren's for 2-3 years, and that is managed by Dr. Stringer in rheumatology.  Her current dose is 7 mg alternating with 8 mg.  She did try decreasing it to 7 mg daily, but she developed worse aches and so increased back to 7 alternating with 8 mg.  She denies new skin rashes.  She did finish pulmonary rehab and found it to be very helpful.  She is still doing the exercises and the weights but is not walking.  She does not have a treadmill and has not been walking outside because of the weather.  She denies paroxysmal nocturnal dyspnea.  She continues to have dyspnea on exertion, for example when she walks up one up one flight of stairs.  She is using her Flovent twice a day.  She does not use her albuterol because she does not think about it.  She uses oxygen 4 L/min.  She is wondering if there is a portable oxygen device that she could use.        Current Outpatient Medications   Medication     azithromycin (ZITHROMAX) 250 MG tablet     ACCU-CHEK SHANNON PLUS test strip     acetaminophen (TYLENOL) 500 MG tablet     acyclovir (ZOVIRAX) 400 MG tablet     acyclovir (ZOVIRAX) 5 % external ointment     albuterol (PROAIR HFA, PROVENTIL HFA, VENTOLIN HFA) 108 (90 BASE) MCG/ACT inhaler     alendronate (FOSAMAX) 70 MG tablet     atorvastatin (LIPITOR) 10 MG tablet     BASAGLAR 100 UNIT/ML injection      BD JANE U/F 32G X 4 MM insulin pen needle     blood glucose monitoring (ACCU-CHEK SHANNON PLUS) test strip     blood glucose monitoring (ACCU-CHEK FASTCLIX) lancets     blood glucose monitoring (ACCU-CHEK MULTICLIX) lancets     COMPRESSION STOCKINGS     escitalopram (LEXAPRO) 20 MG tablet     fluticasone (FLONASE) 50 MCG/ACT nasal spray     fluticasone (FLOVENT HFA) 220 MCG/ACT inhaler     gabapentin (NEURONTIN) 300 MG capsule     Humidifier MISC     insulin glargine (BASAGLAR KWIKPEN) 100 UNIT/ML pen     insulin pen needle (B-D U/F) 31G X 8 MM     ketoconazole (NIZORAL) 2 % external cream     lidocaine (LIDODERM) 5 % patch     loratadine (CLARITIN) 10 MG tablet     metFORMIN (GLUCOPHAGE-XR) 500 MG 24 hr tablet     metoprolol succinate (TOPROL XL) 25 MG 24 hr tablet     metoprolol succinate (TOPROL-XL) 100 MG 24 hr tablet     montelukast (SINGULAIR) 10 MG tablet     NOVOLOG FLEXPEN 100 UNIT/ML soln     NOVOLOG FLEXPEN 100 UNIT/ML soln     order for DME     order for DME     order for DME     potassium chloride SA (K-DUR/KLOR-CON M) 20 MEQ CR tablet     predniSONE (DELTASONE) 1 MG tablet     predniSONE (DELTASONE) 5 MG tablet     predniSONE (DELTASONE) 5 MG tablet     rivaroxaban ANTICOAGULANT (XARELTO) 20 MG TABS tablet     spironolactone (ALDACTONE) 25 MG tablet     tiZANidine (ZANAFLEX) 2 MG tablet     torsemide (DEMADEX) 20 MG tablet     traZODone (DESYREL) 50 MG tablet     No current facility-administered medications for this visit.      Allergies   Allergen Reactions     Amoxicillin-Pot Clavulanate      Augmentin Nausea and Vomiting     Codeine Nausea and Vomiting     Codeine      Phenobarbital Itching     Phenobarbital      Seasonal Allergies      Past Medical History:   Diagnosis Date     Alcohol abuse, in remission      Allergic rhinitis, cause unspecified     allegra helps when she takes it     Antiplatelet or antithrombotic long-term use      Atrial fibrillation (H)     in hosp in 11/11 after surgery w/  fluid overload     Cardiomegaly     LVH on stress echo- cardiac w/u at [facility] ER- neg CT scan for PE, neg stress echo in 8/06     Chest pain, unspecified      Disorder of bone and cartilage, unspecified     osteopenia (had been on prempro), improved on 6/06 dexa, stable dexa 11/10     Diverticulosis of colon (without mention of hemorrhage)     last episode yrs ago     Essential hypertension, benign      Follicular bronchiolitis (H)     associated with Sjogrens, dx by chest CT showing mosaic attenuation and air trapping     Gastro-oesophageal reflux disease      ILD (interstitial lung disease) (H)     associated with Sjogrens, also has mildly elevated IgG4, first noted on chest CT 2015 (mild changes) and also has small airways disease     Insomnia, unspecified     weaned off clonazepam     Irregular heart beat      Lumbago 7/09    MRI with DJD, now seeing Dr. Cain for sciatic sx's     Major depressive disorder, recurrent episode, moderate (H)      Obstructive sleep apnea      Osteoarthrosis, unspecified whether generalized or localized, unspecified site      Sjogren's syndrome (H)     + RG and SSA and lip bx     Sleep apnea      Tobacco use disorder     chantix in 9/07, started again in 6/08, working       Past Surgical History:   Procedure Laterality Date     BACK SURGERY  1962     BIOPSY BREAST  9/27/02    Biopsy Left Breast     C APPENDECTOMY  1970's?     C NONSPECIFIC PROCEDURE  11/05    exploratory abd lap, adhesions, resolved RLQ pain, diverticulitis episodes     CARDIAC SURGERY       CHOLECYSTECTOMY  1990's?     COLECTOMY LEFT  11/7/2011    Procedure:COLECTOMY LEFT; Laparoscopic mobilization of splenic flexture, sigmoid colectomy, coloprotoscopy, loop illeostomy; Surgeon:CK CASTANEDA; Location:[facility] OR     HYSTERECTOMY TOTAL ABDOMINAL, BILATERAL SALPINGO-OOPHORECTOMY, COMBINED  11/7/2011    Procedure:COMBINED HYSTERECTOMY TOTAL ABDOMINAL, BILATERAL SALPINGO-OOPHORECTOMY; total abdominal hysterectomy,  bilateral salpingo-oophorectomy; Surgeon:ALETA MANUEL; Location:[facility] OR     INSERT STENT URETER  2011    Procedure:INSERT STENT URETER; Placement of Bilateral Ureteral Stents ; Surgeon:PRANEETH BRYANT; Location:[facility] OR     SIGMOIDOSCOPY FLEXIBLE  11/3/2011    Procedure:SIGMOIDOSCOPY FLEXIBLE; Flexible Sigmoidoscopy; Surgeon:CK CASTANEDA; Location:[facility] OR     TAKEDOWN ILEOSTOMY  2012    Procedure:TAKEDOWN ILEOSTOMY; Takedown Loop Ileostomy ; Surgeon:CK CASTANEDA; Location:[facility]       Social History     Socioeconomic History     Marital status: Single     Spouse name: Not on file     Number of children: 0     Years of education: Ed Spec De     Highest education level: Not on file   Occupational History     Occupation: Professor     Employer: [employer]     Comment: [employer]- Education   Social Needs     Financial resource strain: Not on file     Food insecurity:     Worry: Not on file     Inability: Not on file     Transportation needs:     Medical: Not on file     Non-medical: Not on file   Tobacco Use     Smoking status: Former Smoker     Packs/day: 0.50     Years: 10.00     Pack years: 5.00     Types: Cigarettes     Last attempt to quit: 2011     Years since quittin.5     Smokeless tobacco: Never Used     Tobacco comment:  ppd   Substance and Sexual Activity     Alcohol use: No     Alcohol/week: 0.0 oz     Comment: In recovery beginning      Drug use: No     Sexual activity: No   Lifestyle     Physical activity:     Days per week: Not on file     Minutes per session: Not on file     Stress: Not on file   Relationships     Social connections:     Talks on phone: Not on file     Gets together: Not on file     Attends Tenriism service: Not on file     Active member of club or organization: Not on file     Attends meetings of clubs or organizations: Not on file     Relationship status: Not on file     Intimate partner violence:     Fear of current  "or ex partner: Not on file     Emotionally abused: Not on file     Physically abused: Not on file     Forced sexual activity: Not on file   Other Topics Concern     Parent/sibling w/ CABG, MI or angioplasty before 65F 55M? Not Asked   Social History Narrative                   Family History   Problem Relation Age of Onset     C.A.D. Mother 63        MI- first at age 63     Heart Disease Mother      Hypertension Mother      Cerebrovascular Disease Mother      Hyperlipidemia Mother      Alcohol/Drug Father      Alzheimer Disease Father      Dementia Father      Hypertension Father      Hyperlipidemia Father      Diabetes Sister      C.A.D. Sister 52        Minor MI- age 50's     Heart Disease Sister      Hypertension Sister      Hypertension Sister      Hypertension Brother      Cancer - colorectal Sister 48        Late 40's early 50's     Prostate Cancer Brother 74        Dx'd age 74     Gastrointestinal Disease Sister         Diverticulitis     Gastrointestinal Disease Brother         Diverticulitis     Lipids Sister      Lipids Sister      Parkinsonism Brother      Diabetes Sister      Heart Disease Sister         CHF     Cancer Sister         lung, smoker     Substance Abuse Sister      Substance Abuse Brother      Asthma Sister      Cancer Sister      Breast Cancer Daughter      Prostate Cancer Brother      Hyperlipidemia Brother      Diabetes Other      Hypertension Other              ROS Pulmonary  A complete ROS was otherwise negative except as noted in the HPI.    Vitals: /68 (BP Location: Right arm, Patient Position: Chair, Cuff Size: Adult Large)   Pulse 57   Resp 16   Ht 1.702 m (5' 7.01\")   Wt 96.2 kg (212 lb)   SpO2 95%   BMI 33.20 kg/m       Exam:   GENERAL APPEARANCE: Well developed, well nourished, alert, and in no apparent distress.  RESP: good air flow throughout.  Few bibasilar inspiratory crackles. No rhonchi. No wheezes.  CV: Normal S1, S2, regular rhythm, normal rate. No murmur.  No " LE edema.   MS: extremities normal. No clubbing. No cyanosis.  SKIN: no rash on limited exam.  NEURO: Mentation intact, speech normal, normal gait and stance.  PSYCH: mentation appears normal. and affect normal/bright.    Results:  Recent Results (from the past 168 hour(s))   General PFT Lab (Please always keep checked)    Collection Time: 03/01/19 10:19 AM   Result Value Ref Range    FVC-Pred 2.74 L    FVC-Pre 1.69 L    FVC-%Pred-Pre 61 %    FEV1-Pre 1.26 L    FEV1-%Pred-Pre 60 %    FEV1FVC-Pred 77 %    FEV1FVC-Pre 75 %    FEFMax-Pred 5.21 L/sec    FEFMax-Pre 4.43 L/sec    FEFMax-%Pred-Pre 84 %    FEF2575-Pred 1.66 L/sec    FEF2575-Pre 0.91 L/sec    EGD8963-%Pred-Pre 54 %    ExpTime-Pre 7.82 sec    FIFMax-Pre 3.24 L/sec    VC-Pred 3.08 L    VC-Pre 1.71 L    VC-%Pred-Pre 55 %    IC-Pred 2.79 L    IC-Pre 1.63 L    IC-%Pred-Pre 58 %    ERV-Pred 0.29 L    ERV-Pre 0.07 L    ERV-%Pred-Pre 24 %    FEV1FEV6-Pred 78 %    FEV1FEV6-Pre 75 %    FRCPleth-Pred 2.80 L    FRCPleth-Pre 2.44 L    FRCPleth-%Pred-Pre 87 %    RVPleth-Pred 2.26 L    RVPleth-Pre 2.37 L    RVPleth-%Pred-Pre 104 %    TLCPleth-Pred 5.19 L    TLCPleth-Pre 4.08 L    TLCPleth-%Pred-Pre 78 %    DLCOunc-Pred 19.90 ml/min/mmHg    DLCOunc-Pre 18.08 ml/min/mmHg    DLCOunc-%Pred-Pre 90 %    VA-Pre 2.94 L    VA-%Pred-Pre 59 %    FEV1SVC-Pred 68 %    FEV1SVC-Pre 74 %   General PFT Lab (Please always keep checked)    Collection Time: 03/01/19 11:45 AM   Result Value Ref Range    FVC-Pred 2.74 L    FVC-Pre 2.13 L    FVC-%Pred-Pre 77 %    FEV1-Pre 1.81 L    FEV1-%Pred-Pre 86 %    FEV1FVC-Pred 77 %    FEV1FVC-Pre 85 %    FEFMax-Pred 5.21 L/sec    FEFMax-Pre 5.81 L/sec    FEFMax-%Pred-Pre 111 %    FEF2575-Pred 1.66 L/sec    FEF2575-Pre 2.08 L/sec    COW4392-%Pred-Pre 124 %    ExpTime-Pre 6.02 sec    FIFMax-Pre 3.59 L/sec    VC-Pred 3.08 L    VC-Pre 2.27 L    VC-%Pred-Pre 73 %    IC-Pred 2.79 L    IC-Pre 1.87 L    IC-%Pred-Pre 67 %    ERV-Pred 0.29 L    ERV-Pre 0.40 L     ERV-%Pred-Pre 135 %    FEV1FEV6-Pred 78 %    FEV1FEV6-Pre 85 %    FRCPleth-Pred 2.80 L    FRCPleth-Pre 1.89 L    FRCPleth-%Pred-Pre 67 %    RVPleth-Pred 2.26 L    RVPleth-Pre 1.49 L    RVPleth-%Pred-Pre 65 %    TLCPleth-Pred 5.19 L    TLCPleth-Pre 3.76 L    TLCPleth-%Pred-Pre 72 %    DLCOunc-Pred 19.90 ml/min/mmHg    DLCOunc-Pre 18.77 ml/min/mmHg    DLCOunc-%Pred-Pre 94 %    VA-Pre 3.36 L    VA-%Pred-Pre 68 %    FEV1SVC-Pred 68 %    FEV1SVC-Pre 80 %       I reviewed pulmonary function test that was performed today.  This shows nonspecific spirometry pattern with normal ratio and normal lung lung volumes and normal diffusion.  Spirometry shows stable FVC and minimal drop in FEV1 compared to 4 months ago.      I reviewed results with the patient.      Assessment and plan:     Sister Sita is a 78-year-old with Sjogren's follicular bronchiolitis and history of mild ILD who is here for follow-up.    1.  Sjogren's follicular bronchiolitis.  She has been on prednisone for 2-3 years for her Sjogren's, and the prednisone dose is being managed by Dr. Stringer.  Her follicular bronchiolitis appears stable with PFT suggesting obstructive lung disease.  I think it is reasonable to add daily azithromycin 250 mg as an added anti-inflammatory for her follicular bronchiolitis.  The prednisone taper likely is going to be limited by musculoskeletal symptoms, and Dr. Hernandez will manage that.  For now we will continue Flovent inhaler twice daily, but we could stop this in the future if she responds to the daily azithromycin.  Her prednisone dose will continue the same until she sees Dr. Stringer.  She will return in 3 months with full PFT.  She will see [facility] Velasquez at [facility] in 6 months and continue alternating seeing me or Dr. Velasquez after that.    2.  Medication monitoring.  I will get an EKG today before starting azithromycin.  I will check another EKG in 3 months to monitor the azithromycin.  If those are normal, then she  would not need further monitoring.    3.  Hypoxia.  She will continue oxygen 4 L/min.  I will ask my nurse to talk with her about portable oxygen device options and whether they would work for her.    4.  History of Sjogren's ILD.  The most recent chest CT in 2018 showed improvement in her ILD with no further ILD present.  This could be because she was treated with prednisone.    Maryjane Tillman MD

## 2019-03-04 LAB — INTERPRETATION ECG - MUSE: NORMAL

## 2019-03-05 NOTE — TELEPHONE ENCOUNTER
PA Initiation    Medication: lidocaine (LIDODERM) 5 % patch  Insurance Company: [employer] - Phone [phone] Fax [phone]  Pharmacy Filling the Rx: [id number] - [place] - [address]  Filling Pharmacy Phone: [phone]  Filling Pharmacy Fax:    Start Date: 3/5/2019

## 2019-03-05 NOTE — TELEPHONE ENCOUNTER
PRIOR AUTHORIZATION DENIED    Medication: lidocaine (LIDODERM) 5 % patch denied     Denial Date: 3/5/2019    Denial Rational:   Insurance doesn't cover this medication for off-label use.  It is only approved for Post Herpetic Neuralogia, Diabetic Neuropathy, and Cancer pain      Appeal Information:IF YOU WOULD LIKE TO APPEAL, PLEASE SUPPLY PA TEAM WITH A LETTER OF MEDICAL NECESSITY.

## 2019-03-06 LAB
DLCOUNC-%PRED-PRE: 90 %
DLCOUNC-PRE: 18.08 ML/MIN/MMHG
DLCOUNC-PRED: 19.9 ML/MIN/MMHG
ERV-%PRED-PRE: 24 %
ERV-PRE: 0.07 L
ERV-PRED: 0.29 L
EXPTIME-PRE: 7.82 SEC
FEF2575-%PRED-PRE: 54 %
FEF2575-PRE: 0.91 L/SEC
FEF2575-PRED: 1.66 L/SEC
FEFMAX-%PRED-PRE: 84 %
FEFMAX-PRE: 4.43 L/SEC
FEFMAX-PRED: 5.21 L/SEC
FEV1-%PRED-PRE: 60 %
FEV1-PRE: 1.26 L
FEV1FEV6-PRE: 75 %
FEV1FEV6-PRED: 78 %
FEV1FVC-PRE: 75 %
FEV1FVC-PRED: 77 %
FEV1SVC-PRE: 74 %
FEV1SVC-PRED: 68 %
FIFMAX-PRE: 3.24 L/SEC
FRCPLETH-%PRED-PRE: 87 %
FRCPLETH-PRE: 2.44 L
FRCPLETH-PRED: 2.8 L
FVC-%PRED-PRE: 61 %
FVC-PRE: 1.69 L
FVC-PRED: 2.74 L
IC-%PRED-PRE: 58 %
IC-PRE: 1.63 L
IC-PRED: 2.79 L
RVPLETH-%PRED-PRE: 104 %
RVPLETH-PRE: 2.37 L
RVPLETH-PRED: 2.26 L
TLCPLETH-%PRED-PRE: 78 %
TLCPLETH-PRE: 4.08 L
TLCPLETH-PRED: 5.19 L
VA-%PRED-PRE: 59 %
VA-PRE: 2.94 L
VC-%PRED-PRE: 55 %
VC-PRE: 1.71 L
VC-PRED: 3.08 L

## 2019-03-17 DIAGNOSIS — F33.1 MAJOR DEPRESSIVE DISORDER, RECURRENT EPISODE, MODERATE (H): ICD-10-CM

## 2019-03-17 DIAGNOSIS — E11.9 TYPE 2 DIABETES MELLITUS WITHOUT COMPLICATION, WITHOUT LONG-TERM CURRENT USE OF INSULIN (H): ICD-10-CM

## 2019-03-18 DIAGNOSIS — I50.30 (HFPEF) HEART FAILURE WITH PRESERVED EJECTION FRACTION (H): ICD-10-CM

## 2019-03-18 RX ORDER — ESCITALOPRAM OXALATE 20 MG/1
TABLET ORAL
Qty: 90 TABLET | Refills: 0 | Status: SHIPPED | OUTPATIENT
Start: 2019-03-18 | End: 2019-04-16

## 2019-03-18 RX ORDER — TORSEMIDE 20 MG/1
TABLET ORAL
Qty: 105 TABLET | Refills: 10 | Status: SHIPPED | OUTPATIENT
Start: 2019-03-18 | End: 2019-03-25

## 2019-03-18 RX ORDER — METFORMIN HCL 500 MG
TABLET, EXTENDED RELEASE 24 HR ORAL
Qty: 180 TABLET | Refills: 0 | Status: SHIPPED | OUTPATIENT
Start: 2019-03-18 | End: 2019-06-12

## 2019-03-18 NOTE — TELEPHONE ENCOUNTER
torsemide (DEMADEX) 20 MG tablet   Last Written Prescription Date:  10/10/18  Last Fill Quantity: 105,   # refills: 3  Last Office Visit : 2/14/19  Future Office visit:  8/8/19

## 2019-03-18 NOTE — TELEPHONE ENCOUNTER
"Prescription approved per [facility] Refill Protocol.  Lydia HALEY RN    Requested Prescriptions   Pending Prescriptions Disp Refills     metFORMIN (GLUCOPHAGE-XR) 500 MG 24 hr tablet [Pharmacy Med Name: METFORMIN  MG TABLET] 180 tablet 0     Sig: TAKE 2 TABLETS BY MOUTH DAILY WITH DINNER    Biguanide Agents Passed - 3/17/2019  2:43 PM       Passed - Blood pressure less than 140/90 in past 6 months    BP Readings from Last 3 Encounters:   03/01/19 122/68   02/26/19 110/76   02/14/19 91/62                Passed - Patient has documented LDL within the past 12 mos.    Recent Labs   Lab Test 05/23/18  1028   LDL 21            Passed - Patient has had a Microalbumin in the past 15 mos.    Recent Labs   Lab Test 10/02/18  1524   MICROL 8   UMALCR 29.14*            Passed - Patient is age 10 or older       Passed - Patient has documented A1c within the specified period of time.    If HgbA1C is 8 or greater, it needs to be on file within the past 3 months.  If less than 8, must be on file within the past 6 months.     Recent Labs   Lab Test 10/02/18  1524   A1C 6.7*            Passed - Patient's CR is NOT>1.4 OR Patient's EGFR is NOT<45 within past 12 mos.    Recent Labs   Lab Test 02/13/19  1523   GFRESTIMATED 64   GFRESTBLACK 74       Recent Labs   Lab Test 02/13/19  1523   CR 0.87            Passed - Patient does NOT have a diagnosis of CHF.       Passed - Medication is active on med list       Passed - Patient is not pregnant       Passed - Patient has not had a positive pregnancy test within the past 12 mos.        Passed - Recent (6 mo) or future (30 days) visit within the authorizing provider's specialty    Patient had office visit in the last 6 months or has a visit in the next 30 days with authorizing provider or within the authorizing provider's specialty.  See \"Patient Info\" tab in inbasket, or \"Choose Columns\" in Meds & Orders section of the refill encounter.            escitalopram (LEXAPRO) 20 MG tablet " "[Pharmacy Med Name: ESCITALOPRAM 20 MG TABLET] 90 tablet 1     Sig: TAKE 1 TABLET BY MOUTH EVERY DAY    SSRIs Protocol Passed - 3/17/2019  2:43 PM       Passed - PHQ-9 score less than 5 in past 6 months    Please review last PHQ-9 score.          Passed - Medication is active on med list       Passed - Patient is age 18 or older       Passed - No active pregnancy on record       Passed - No positive pregnancy test in last 12 months       Passed - Recent (6 mo) or future (30 days) visit within the authorizing provider's specialty    Patient had office visit in the last 6 months or has a visit in the next 30 days with authorizing provider or within the authorizing provider's specialty.  See \"Patient Info\" tab in inbasket, or \"Choose Columns\" in Meds & Orders section of the refill encounter.            Next 5 appointments (look out 90 days)    Apr 16, 2019  2:00 PM CDT  Office Visit with Ilene Tristan MD  [facility] ([facility]) [address]  [facility] [address] [phone]          "

## 2019-03-18 NOTE — TELEPHONE ENCOUNTER
Health Call Center    Phone Message    May a detailed message be left on voicemail: yes    Reason for Call: Medication Refill Request    Has the patient contacted the pharmacy for the refill? Yes   Name of medication being requested: torsemide (DEMADEX) 10mg and 20mg  Provider who prescribed the medication: Kiesha Elias  Pharmacy: [id number] - [place], [place] - [address]  Date medication is needed: As soon as possible. Pt is completely out.         Action Taken: Message routed to:  [facility]

## 2019-03-20 DIAGNOSIS — I50.30 (HFPEF) HEART FAILURE WITH PRESERVED EJECTION FRACTION (H): ICD-10-CM

## 2019-03-20 RX ORDER — TORSEMIDE 10 MG/1
TABLET ORAL
Qty: 180 TABLET | Refills: 3 | OUTPATIENT
Start: 2019-03-20

## 2019-03-25 ENCOUNTER — TELEPHONE (OUTPATIENT)
Dept: RHEUMATOLOGY | Facility: CLINIC | Age: 79
End: 2019-03-25

## 2019-03-25 DIAGNOSIS — I50.30 (HFPEF) HEART FAILURE WITH PRESERVED EJECTION FRACTION (H): ICD-10-CM

## 2019-03-25 DIAGNOSIS — I50.43 ACUTE ON CHRONIC COMBINED SYSTOLIC AND DIASTOLIC HEART FAILURE (H): Primary | ICD-10-CM

## 2019-03-25 RX ORDER — TORSEMIDE 20 MG/1
TABLET ORAL
Qty: 105 TABLET | Refills: 10 | Status: SHIPPED | OUTPATIENT
Start: 2019-03-25 | End: 2020-04-16

## 2019-03-25 RX ORDER — TORSEMIDE 10 MG/1
TABLET ORAL
Qty: 90 TABLET | Refills: 3 | Status: SHIPPED | OUTPATIENT
Start: 2019-03-25 | End: 2020-03-24

## 2019-03-25 NOTE — TELEPHONE ENCOUNTER
Returned patient call.  She is requesting torsemide 10 mg be sent to her pharmacy.  She has 20 mg tabs and takes 40 in am and 30 in pm, but requests a 10 mg tab as it is too hard for her to cut in half.  Script for 10 mg tabs sent to pt pharmacy this afternoon.

## 2019-03-25 NOTE — TELEPHONE ENCOUNTER
Betty is calling in regards to her prednisone dosing.  She is reporting that she is currently taking Prednisone 7mg / 8mg alternating days.  She will continue on this regimen for a few additional weeks and then will try to decrease down to 7mg.    She states that she is experiencing achiness mainley in her legs.  And it is most certainly worse in the AM until she gets up and moves around.  She has not been walking much because she was finding it difficult to get outside.  But now that the weather has been getting nicer, she has been trying to get out walking.       Rajwinder Reeder MSN, RN  Rheumatology RN Care Coordinator  [facility]

## 2019-04-04 ENCOUNTER — OFFICE VISIT (OUTPATIENT)
Dept: SLEEP MEDICINE | Facility: CLINIC | Age: 79
End: 2019-04-04
Payer: MEDICARE

## 2019-04-04 VITALS
DIASTOLIC BLOOD PRESSURE: 70 MMHG | SYSTOLIC BLOOD PRESSURE: 125 MMHG | OXYGEN SATURATION: 94 % | RESPIRATION RATE: 18 BRPM | HEIGHT: 67 IN | HEART RATE: 52 BPM | WEIGHT: 213 LBS | BODY MASS INDEX: 33.43 KG/M2

## 2019-04-04 DIAGNOSIS — M35.00 PRIMARY SJOGREN'S SYNDROME (H): ICD-10-CM

## 2019-04-04 DIAGNOSIS — E66.811 OBESITY (BMI 30.0-34.9): ICD-10-CM

## 2019-04-04 DIAGNOSIS — G47.33 OSA (OBSTRUCTIVE SLEEP APNEA): Primary | ICD-10-CM

## 2019-04-04 DIAGNOSIS — G25.81 RESTLESS LEGS SYNDROME (RLS): ICD-10-CM

## 2019-04-04 PROCEDURE — 99214 OFFICE O/P EST MOD 30 MIN: CPT | Performed by: NURSE PRACTITIONER

## 2019-04-04 ASSESSMENT — MIFFLIN-ST. JEOR: SCORE: 1478.79

## 2019-04-04 NOTE — PROGRESS NOTES
Cc: Patient returns here today in follow up for treatment of ANGELICA with hypoventilations syndrome and RLS    HPI:  Baseline symptoms were: snoring, restless sleep   .  PSG 2/1/16 AHI 11.4 with hypoventilation syndrome with rise in TCM of 15 mm Hg to 57.  Baseline was CO2 was 44, time < 89% was 3.4 mins. AI 93.5, PLMI 153.5, PLM AI 58.4, treatement PLM AI 43.6.  The patient underwent an all night titration study 2/4/16 with Bilevel PAP as the CPAP trial during the previous split night study was not effective in treating hypoventilation.This all night titration study achieved correction of her ANGELICA and hypoventilation at pressure settings of 24/14 cmH2O. PLM AI 7.7.Comorbidities: sjogren's syndrome with ILD, afib, see below.  Sleep comorbidities RLS, hx of dx of insomnia      New concerns/changes in health:mask >6 months download shows all leak, previous order of mask fit did not occur, dry mouth, pulm rehab went well with Dr. Tillman, DM-recent coma, sjogrens     Sleep Review of Systems:    Equipment issues: yes     Snoring, snort arousals, gasping while on therapy:unsure     Bedpartner c/o's of cpap: n/a, with roommate unsure     Heated humidity problems with rainout/excessive dryness, sore throat:yes     Opening of mouth while on therapy/excessive leak: fface     Swallowing air: no     Skin problems: no     Insufficient sleep, devoting <7 or more hours to sleep lateral  For 7     Problems falling or staying asleep with cpap: staying asleep     RLS:yes, am meeting, rest before bed    Naps: yes    Download today:All leak, was never seen for a mask fit.bipap 24/14cm h20, 95th percentile leak 94.3cm H20      Full report scanned into medical record    Response to therapy: no benefit with all leak  Willingness to continue: yes    Allergies:    Allergies   Allergen Reactions     Amoxicillin-Pot Clavulanate      Augmentin Nausea and Vomiting     Codeine Nausea and Vomiting     Codeine      Phenobarbital Itching      Phenobarbital      Seasonal Allergies        Medications:    Current Outpatient Medications   Medication Sig Dispense Refill     ACCU-CHEK SHANNON PLUS test strip USE TO TEST BLOOD SUGARS 4 TIMES DAILY OR AS DIRECTED 400 strip 0     acetaminophen (TYLENOL) 500 MG tablet Take 500 mg by mouth nightly as needed        acyclovir (ZOVIRAX) 400 MG tablet Take 1 tablet (400 mg) by mouth 3 times daily For a couple days 15 tablet 2     acyclovir (ZOVIRAX) 5 % external ointment Apply topically 6 times daily As needed for outbreaks 15 g 3     albuterol (PROAIR HFA, PROVENTIL HFA, VENTOLIN HFA) 108 (90 BASE) MCG/ACT inhaler Inhale 2 puffs into the lungs every 6 hours 1 Inhaler 3     atorvastatin (LIPITOR) 10 MG tablet TAKE 1 TABLET BY MOUTH EVERY DAY 90 tablet 0     azithromycin (ZITHROMAX) 250 MG tablet Take 1 tablet (250 mg) by mouth daily 30 tablet 11     BD JANE U/F 32G X 4 MM insulin pen needle USE 4 DAILY AS DIRECTED. 400 each 1     blood glucose monitoring (ACCU-CHEK SHANNON PLUS) test strip Use to test blood sugar 4 times daily or as directed.  Ok to substitute alternative if insurance prefers. 120 strip 11     blood glucose monitoring (ACCU-CHEK FASTCLIX) lancets Use to test blood sugar 4 times daily or as directed.  Ok to substitute alternative if insurance prefers. 1 Box 11     blood glucose monitoring (ACCU-CHEK MULTICLIX) lancets Use to test blood sugar 4 times daily or as directed. 4 Box 3     COMPRESSION STOCKINGS Wear compression stockings in affected leg (right leg) or both legs most of the time during the day and take them off at night. 2 each 2     escitalopram (LEXAPRO) 20 MG tablet TAKE 1 TABLET BY MOUTH EVERY DAY 90 tablet 0     fluticasone (FLONASE) 50 MCG/ACT nasal spray Spray 1-2 sprays into both nostrils daily as needed for allergies 1 Bottle 3     fluticasone (FLOVENT HFA) 220 MCG/ACT inhaler Inhale 2 puffs into the lungs 2 times daily 3 Inhaler 3     gabapentin (NEURONTIN) 300 MG capsule Take 1 capsule  (300 mg) by mouth At Bedtime 30 capsule 0     Humidifier MISC Continuous humidified air via concentrator.   Diagnosis: ILD  Duration: Lifetime 99. 1 each 1     insulin glargine (BASAGLAR KWIKPEN) 100 UNIT/ML pen INJECT 25 UNITS SUBCUTANEOUS DAILY (TO REPLACE LANTUS) 15 mL 1     insulin pen needle (B-D U/F) 31G X 8 MM Use 4 times daily (with Lantus and Novolog) or as directed. 400 each 3     ketoconazole (NIZORAL) 2 % external cream Apply topically 2 times daily 60 g 1     lidocaine (LIDODERM) 5 % patch Apply patch to painful area for up to 12 h within a 24 h period.  Remove after 12 hours. 30 patch 5     loratadine (CLARITIN) 10 MG tablet Take 1 tablet (10 mg) by mouth daily 90 tablet 1     metFORMIN (GLUCOPHAGE-XR) 500 MG 24 hr tablet TAKE 2 TABLETS BY MOUTH DAILY WITH DINNER 180 tablet 0     metoprolol succinate (TOPROL XL) 25 MG 24 hr tablet Take 0.5 tablets (12.5 mg) by mouth 2 times daily Take 50 mg by mouth in combination with 12.5 for a total of 62.5 twice a day 90 tablet 3     metoprolol succinate (TOPROL-XL) 100 MG 24 hr tablet Take 50 mg by mouth in combination with 12.5 for a total of 62.5 twice a day 90 tablet 3     montelukast (SINGULAIR) 10 MG tablet TAKE 1 TABLET BY MOUTH EVERYDAY AT BEDTIME 90 tablet 0     NOVOLOG FLEXPEN 100 UNIT/ML soln INJECT 12 UNITS SUBCUTANEOUS 3 TIMES DAILY (WITH MEALS) 15 mL 1     NOVOLOG FLEXPEN 100 UNIT/ML soln INJECT 12 UNITS SUBCUTANEOUS 3 TIMES DAILY (WITH MEALS) 15 mL 1     order for DME Please provide patient with a POC.  Okay to test patient on POC to maintain oxygen saturations above 90%.   Patient currently uses 2 to 3 LPM oxygen with activity. 1 Device 0     order for DME Equipment being ordered: Oxygen  Pulsed oxygen portable tank  Rate: 4LNC  Diagnosis: ILD  Duration: Lifetime -99 1 Device 1     order for DME Equipment being ordered: Full face mask for CPAP - size: F10 large 1 each 0     order for DME Oxygen: Patient requires supplemental Oxygen 4 LPM via nasal  curt with activity. Please provide patient with a home unit and with portability capability. Oxygen will be for a lifetime. 1 Device 0     potassium chloride SA (K-DUR/KLOR-CON M) 20 MEQ CR tablet Take 2 tabs (40 meq) in am and 1 tab (20 meq) in pm daily 270 tablet 3     predniSONE (DELTASONE) 1 MG tablet Use with 5 mg tabs to taper: 10 mg and 9 mg every other day for 1m, 9 mg/day for 1m, 9 mg and 8 mg every other day for 1m, etc. until at 7mg 120 tablet 6     predniSONE (DELTASONE) 5 MG tablet Take 7 mg a day, use with 1 mg tablets. (Patient taking differently: 8 mg Take 7 mg a day, use with 1 mg tablets.) 60 tablet 3     predniSONE (DELTASONE) 5 MG tablet Take 5 mg by mouth daily. (Patient taking differently: Take 7 mg by mouth daily .) 60 tablet 0     rivaroxaban ANTICOAGULANT (XARELTO) 20 MG TABS tablet Take 1 tablet (20 mg) by mouth daily (with dinner) 90 tablet 3     spironolactone (ALDACTONE) 25 MG tablet Take 2 tablets (50 mg) by mouth daily 180 tablet 3     tiZANidine (ZANAFLEX) 2 MG tablet Take 1-2 tablets (2-4 mg) by mouth 3 times daily 90 tablet 1     torsemide (DEMADEX) 10 MG tablet Take one tab (10 mg) with one 20 mg tab to = 30 mg daily in afternoon. 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  Initially anticoagulated with warfarin.  Switched to xarelto in early '18.         Diverticulitis of colon 09/24/2004     [facility]: High     Multiple complications,  surgeries, and hospitalizations from ~9/11-12/11 (see 12/09/11 note for overview).  A.fib started during with fluid overload during one of these hospitalizations.  Colostomy takedown in 2/12.  F/u colonoscopy in 12/12 (Dr. Canseco) looked good- rec next f/u colonoscopy in 10 yrs.           Follicular bronchiolitis (H)      [facility]: Medium     associated with Sjogrens, dx by chest CT showing mosaic attenuation and air trapping       Inflammatory arthritis 11/09/2017     Priority: Medium     Long term current use of anticoagulant therapy 10/05/2017     Priority: Medium     Xarelto (switched to this from coumadin in early 2018 via cardiology)       Hyperlipidemia LDL goal <100 05/22/2017     Priority: Medium     Lipitor 40mg/d started in 4/17 (with DM dx, but LDL too low at 39 in 6/17, so lowered dose to 20mg/d in 8/17).  2/18 results- LDL down at '5'- will discuss lowering lipitor dose again at next visit.  Lipitor lowered to 10mg/d in 3/18.  F/u LDL in 5/18 at '21', will continue at 10mg/day unless LDL goes lower again.       Heart failure, chronic, with acute decompensation (H) 04/08/2017     Priority: Medium     Type 2 diabetes mellitus with hyperglycemia, with long-term current use of insulin (H) 10/24/2016     Priority: Medium     Dx in 11/16 with A1C of 7.0.        (HFpEF) heart failure with preserved ejection fraction (H) 08/27/2016     Priority: Medium     7/16 Cardiology notes- She was admitted again 7/2-7/4/16  with hypoxemia, which was felt to be related to HFpEF. She was noted in this setting to be in asymptomatic A.fib, [id number]. Toprol XL was increased to 50 mg daily. As she had shown no evidence of recurrent GI bleeding, warfarin was resumed on discharge. She was scheduled for EP follow up to address A.fib. Of note, she was also diagnosed with ANGELICA and started on CPAP at this time which she has been using regularly.  Assessment- Sister Sita continues to experience NYHA class II-III exertional  dyspnea and appears modestly hypervolemic on exam today. We have instructed her to increase her furosemide to 40 mg qAM/20mg qPM for the next 2 days, and arranged for her to establish care with [facility] within 1 week with BMP at that time.       Acute and chronic respiratory failure with hypoxia (H) 07/02/2016     Priority: Medium     Lower GI bleed 06/26/2016     Priority: Medium     Hospitalized 6/26-28/16 with [facility].  Colonoscopy with Dr. Siddiqi intended for after hospital stay, but pt had multiple complications including readmission on 7/2/16 for respiratory failure due to fluid overload and uncontrolled a.fib.  12/16 consult with Dr. Siddiqi- wary of pt's ability to safely tolerate a colonoscopy or colon surgery.  Rec considering cologuard test to stratify her risk- will discuss with pt at upcoming visit/s.  Discussed- pt is not interested in any testing- would not act on the results if cancer.       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  Followed by [facility] and rheumatology.  Worsening PFT's in 10/15 despite increase in prednisone from 5mg/d to 10mg/d.    ILD conference- thought Follicular bronchiolitis -started on flovent, azithromycin and montelukast in 4/16.  4/17- off home O2 (had been on for activity) since 4/17 hospitalization txt of pneumonia/influenza/acute respiratory failure.       ANGELICA (obstructive sleep apnea) 04/12/2016     Priority: Medium     Sleep study in '16, mild sleep apnea, but significant sleep hypoventilation.  Started on CPAP, not helpful, so put on bilevel PAP through Dr. Chandler.       Sjogren's syndrome with lung involvement (H) 10/30/2015     Priority: Medium     Dx in '15 by rheum- likely primary Sjogrens syndrome- 'with borderline positive lip bx, low titer RG, low titer SSA ab, sicca symptoms, interstitial lung disease w/ reticular opacities in lungs- paratracheal  "lymphadenopathy, elevated CRP'.    Rheum rec txt per pulmonary, rec f/u with rheum in 4/16.   Sx's likely since '12 with migrating joint pains, seen by rheum in '12 (Dr. Ray), dx with inflammatory jt disease, on 0-5mg of prednisone since then with minimal f/u.  Worsening SOB since early '15, lung lesions, PFT's with 50% decreased function- referred to pulmonary.  Has been on more stable prednisone 5mg/d for few months prior to 10/15 f/u PFT's stable, which remained stable.  Will cont f/u through pulmonary and rheum.           Enlarged lymph node 08/04/2015     Priority: Medium     Gastroesophageal reflux disease without esophagitis 07/23/2015     Priority: Medium     PPI started in 11/11 (when hospitalized for colonic abscess).  Increased to BID in 1/8/16- saw pulmonologist, noted at visit: \"GERD: continues to have heart burn, advised patient to increase PPI from once per day to BID\"       Neck mass 04/28/2015     Priority: Medium     4/7/15 CT scan- prominent lymph nodes- rec f/u clinically, or f/u CT scan in 3-6 months.  Also noted apical lung changes- air trapping vs interstitial pulmonary edema (referred to pulmonary- dx with Sjogren's after seen by rheumatology).  6/15 CT scan- stable neck node, indeterminant- no f/u rec.  Possible related to the Sjogren's.    8/15- FNA of neck mass by ENT, Dr. Polk, no signs of malignancy/lymphoma.  F/u with 9/15 with Dr. Polk - focus on lip/cheek bx's.         Rib pain 10/31/2014     Priority: Medium     S/p severe fall down stairs in 8/14- hospitalized for a few days, then in rehab for a couple weeks.  Significant swelling, no fx's.  INR dropped for a bit between hosp/rehab, and pt developed DVT in 9/14 in R leg.       Fatty liver disease, nonalcoholic 09/24/2014     Priority: Medium     DVT (deep venous thrombosis) (H) 09/15/2014     Priority: Medium     Dx on 9/11/14 via u/s- worsening sx's over prior few days.  10/14 hematology consult- felt that the DVT was not a " coumadin failure- attributed to immobility s/p fall, lower INR during hospitalization/rehab stay.    Rec continued long-term coumadin therapy (due to a.fib), f/u LE u/s in 3-6- months (nl f/u u/s in 4/15), and compression stockings x 2 yrs (pt compliant).  2/19- coumadin switched to xarelto via cardiology       Breast fibroadenoma 08/18/2014     Priority: Medium     1/14- rec f/u mammogram in a year.       Allergic reaction caused by a drug - likely plaquenil 04/30/2013     Priority: Medium     Pt seen at [facility] 5/13/13- plan to do prednisone 7.5mg daily until able to wean down (Dr. Kevin Marie).  Blood tests q3 months.       Joint pains 01/08/2013     Priority: Medium     Inflam jt issue- shoulder, hp, generalized jt pains resolved with prednisone- dx with Sjogrens - initial dx pallendromic rheumatism vs pseudogout per rheum- stabilized on prednisone.       Insomnia 05/26/2012     Priority: Medium     Left ventricular hypertrophy 11/04/2011     Priority: Medium     LVH on stress echo- cardiac w/u at [facility] ER- neg CT scan for PE, neg stress echo in 8/06        Essential hypertension with goal blood pressure less than 140/90 10/19/2010     Priority: Medium     Lisinopril, lasix, spironolactone, toprol XL.  Diltiazem stopped in 12/15 due to dizziness (discussed with Dr. Hurtado- switched to toprol XL).       Major depressive disorder, recurrent episode, moderate (H)      Priority: Medium     Lexapro 10mg/d.  10/17- went from paxil 10mg/d to lexapro 10mg/d.  Pt had been on paxil 10mg/d for years- increased to 20mg/d in 8/14.    Trial off in 10/15 (concerned with wt gain).  Also tried off in 5/09, restarted 7/09.    Weaned off clonazepam.           Restless legs syndrome (RLS) 09/06/2007     Priority: Medium     Health Care Home 02/09/2012     Priority: Low              Advanced directives, counseling/discussion 11/01/2011     Priority: Low     Received outside advance directive.  HCD:Previously signed by patient and  notarized by [employer].  scanned into EMR as Advance Directive/Living Will document. View document and details in Code Status History Report. Please see advance directive for specifics.   Health care directive (FIVE WISHES) reviewed and documented.  5/10/12 MALIKA Aguilar LPN        Received outside DNR form.    scanned and placed behind media tab.  Please see DNR form for specifics. Routed to certified facilitator for review and further documentation.  DNR form reviewed and documented.  11/1/11 MALIKA Aguilar LPN  In media tab under date 10/14/11.  Lev Tristan MD           Disorder of bone and cartilage 09/24/2004     Priority: Low     Osteopenia in '10, recheck '15.  Risk factors of PPI use and prednisone use since '12 (0-5mg/d).  Should check Vit D levels as well.       Osteoarthritis 09/24/2004     Priority: Low     Problem list name updated by automated process. Provider to review       Alcohol abuse, in remission 09/24/2004     Priority: Low     Temporomandibular joint disorder 11/24/2003     Priority: Low     Problem list name updated by automated process. Provider to review          Past Medical/Surgical History:  Past Medical History:   Diagnosis Date     Alcohol abuse, in remission      Allergic rhinitis, cause unspecified     allegra helps when she takes it     Antiplatelet or antithrombotic long-term use      Atrial fibrillation (H)     in hosp in 11/11 after surgery w/ fluid overload     Cardiomegaly     LVH on stress echo- cardiac w/u at [facility] ER- neg CT scan for PE, neg stress echo in 8/06     Chest pain, unspecified      Disorder of bone and cartilage, unspecified     osteopenia (had been on prempro), improved on 6/06 dexa, stable dexa 11/10     Diverticulosis of colon (without mention of hemorrhage)     last episode yrs ago     Essential hypertension, benign      Follicular bronchiolitis (H)     associated with Sjogrens, dx by chest CT showing mosaic attenuation and air  trapping     Gastro-oesophageal reflux disease      ILD (interstitial lung disease) (H)     associated with Sjogrens, also has mildly elevated IgG4, first noted on chest CT 2015 (mild changes) and also has small airways disease; ILD improved on follow up chest CT 2018.     Insomnia, unspecified     weaned off clonazepam     Irregular heart beat      Lumbago 7/09    MRI with [facility], now seeing Dr. Cain for sciatic sx's     Major depressive disorder, recurrent episode, moderate (H)      Obstructive sleep apnea      Osteoarthrosis, unspecified whether generalized or localized, unspecified site      Sjogren's syndrome (H)     + RG and SSA and lip bx     Sleep apnea      Tobacco use disorder     chantix in 9/07, started again in 6/08, working     Past Surgical History:   Procedure Laterality Date     BACK SURGERY  1962     BIOPSY BREAST  9/27/02    Biopsy Left Breast     C APPENDECTOMY  1970's?     C NONSPECIFIC PROCEDURE  11/05    exploratory abd lap, adhesions, resolved RLQ pain, diverticulitis episodes     CARDIAC SURGERY       CHOLECYSTECTOMY  1990's?     COLECTOMY LEFT  11/7/2011    Procedure:COLECTOMY LEFT; Laparoscopic mobilization of splenic flexture, sigmoid colectomy, coloprotoscopy, loop illeostomy; Surgeon:CK CASTANEDA; Location:[facility]     HYSTERECTOMY TOTAL ABDOMINAL, BILATERAL SALPINGO-OOPHORECTOMY, COMBINED  11/7/2011    Procedure:COMBINED HYSTERECTOMY TOTAL ABDOMINAL, BILATERAL SALPINGO-OOPHORECTOMY; total abdominal hysterectomy, bilateral salpingo-oophorectomy; Surgeon:ALETA MANUEL; Location:[facility]     INSERT STENT URETER  11/7/2011    Procedure:INSERT STENT URETER; Placement of Bilateral Ureteral Stents ; Surgeon:PRANEETH BRYANT; Location:[facility]     SIGMOIDOSCOPY FLEXIBLE  11/3/2011    Procedure:SIGMOIDOSCOPY FLEXIBLE; Flexible Sigmoidoscopy; Surgeon:CK CASTANEDA; Location:[facility] OR     TAKEDOWN ILEOSTOMY  2/1/2012    Procedure:TAKEDOWN ILEOSTOMY; Takedown Loop Ileostomy ; Surgeon:CK CASTANEDA  "NICO; Location:[facility] OR           Physical Examination:  Vitals: /70   Pulse 52   Resp 18   Ht 1.702 m (5' 7\")   Wt 96.6 kg (213 lb)   SpO2 94%   BMI 33.36 kg/m    BMI= Body mass index is 33.36 kg/m .         [place] Total Score 4/4/2019   Total score - [place] 3       GENERAL APPEARANCE: alert, mild distress and somewhat confused    A/P:no benefit from bipap for mild angelica with hypoventilation syndrome due to mask with all leak, RLS worse not on treatment for that, recent DM with difficult to manage BG.      1. ANGELICA: order for mask fit and supplies, will check with [facility] as to whether pt can transfer.  2. Obesity:wt loss maybe helpful.  3. RLS: impacting ability to initiate sleep, ferritin recheck, check to see if on gabapentin in past-she thought she recognized that.  Follow up to be determined.      Time spent with patient is 25 minutes, of which >50% was spent in counseling, education and coordination of care.    "

## 2019-04-04 NOTE — PATIENT INSTRUCTIONS
apria for a mask fit:     The following is recommended:  Step 1.  Fasting ferritin and TIBC. And hemoglobin  Hold off on replacement:  Ferrous sulfate 325mg and 500mg of vitamin C _____ daily. Most common side effects is constipation, which can be treated with increasing water and fiber or adding miralax if needed.   An iron infusion is also a possibility, but there are some side effects of this treatment with rare but possible anaphylaxis, so most of my patients elect ferrous sulfate and add gabapentin till the ferritin level rises.    Other treatments that can work well is developing a routine of stretching legs, warm bath, massage of legs with lotion, all before bedtime.  Avoiding excessive caffeine, alcohol can help as well.    Step 2-wait for this:  Gabapentin 100mg pill, 1.5 hrs before bedtime. 1 pill for 3-5 days, if needed increase to 2 pills, then after 3-5 days can increase to 3 pills if needed.  No driving after taking gabapentin.   Let me know what pharmacy you would like this to go to.      Your BMI is Body mass index is 33.36 kg/m .  Weight management is a personal decision.  If you are interested in exploring weight loss strategies, the following discussion covers the approaches that may be successful. Body mass index (BMI) is one way to tell whether you are at a healthy weight, overweight, or obese. It measures your weight in relation to your height.  A BMI of 18.5 to 24.9 is in the healthy range. A person with a BMI of 25 to 29.9 is considered overweight, and someone with a BMI of 30 or greater is considered obese. More than two-thirds of American adults are considered overweight or obese.  Being overweight or obese increases the risk for further weight gain. Excess weight may lead to heart disease and diabetes.  Creating and following plans for healthy eating and physical activity may help you improve your health.  Weight control is part of healthy lifestyle and includes exercise, emotional health,  and healthy eating habits. Careful eating habits lifelong are the mainstay of weight control. Though there are significant health benefits from weight loss, long-term weight loss with diet alone may be very difficult to achieve- studies show long-term success with dietary management in less than 10% of people. Attaining a healthy weight may be especially difficult to achieve in those with severe obesity. In some cases, medications, devices and surgical management might be considered.  What can you do?  If you are overweight or obese and are interested in methods for weight loss, you should discuss this with your provider.     Consider reducing daily calorie intake by 500 calories.     Keep a food journal.     Avoiding skipping meals, consider cutting portions instead.    Diet combined with exercise helps maintain muscle while optimizing fat loss. Strength training is particularly important for building and maintaining muscle mass. Exercise helps reduce stress, increase energy, and improves fitness. Increasing exercise without diet control, however, may not burn enough calories to loose weight.       Start walking three days a week 10-20 minutes at a time    Work towards walking thirty minutes five days a week     Eventually, increase the speed of your walking for 1-2 minutes at time    In addition, we recommend that you review healthy lifestyles and methods for weight loss available through the National Institutes of Health patient information sites:  http://win.niddk.nih.gov/publications/index.htm    And look into health and wellness programs that may be available through your health insurance provider, employer, local community center, or pam club.    Weight management plan: Patient was referred to their PCP to discuss a diet and exercise plan.

## 2019-04-04 NOTE — Clinical Note
Pt not good historian lately but apria with lack of resp to order for mask fit.Can she transfer?  I think she's on 3rd year of warrently of bipap for ashwin with hypoventilation syndrome.  Significant changes in her health.Magali

## 2019-04-06 NOTE — TELEPHONE ENCOUNTER
predniSONE (DELTASONE) 1 MG tablet  Last Written Prescription Date:  6/7/18  Last Fill Quantity: 120,   # refills: 6  Last Office Visit :12/6/18  Future Office visit:  4/11/19    Routing refill request to provider for review/approval because:  Not sure where she is on taper or at 7 mg.?

## 2019-04-09 DIAGNOSIS — I10 ESSENTIAL HYPERTENSION WITH GOAL BLOOD PRESSURE LESS THAN 140/90: ICD-10-CM

## 2019-04-09 DIAGNOSIS — Z79.4 TYPE 2 DIABETES MELLITUS WITH HYPERGLYCEMIA, WITH LONG-TERM CURRENT USE OF INSULIN (H): ICD-10-CM

## 2019-04-09 DIAGNOSIS — E11.65 TYPE 2 DIABETES MELLITUS WITH HYPERGLYCEMIA, WITH LONG-TERM CURRENT USE OF INSULIN (H): ICD-10-CM

## 2019-04-09 LAB
ANION GAP SERPL CALCULATED.3IONS-SCNC: 10 MMOL/L (ref 3–14)
BUN SERPL-MCNC: 22 MG/DL (ref 7–30)
CALCIUM SERPL-MCNC: 8.8 MG/DL (ref 8.5–10.1)
CHLORIDE SERPL-SCNC: 106 MMOL/L (ref 94–109)
CHOLEST SERPL-MCNC: 118 MG/DL
CO2 SERPL-SCNC: 23 MMOL/L (ref 20–32)
CREAT SERPL-MCNC: 0.85 MG/DL (ref 0.52–1.04)
CREAT UR-MCNC: 56 MG/DL
GFR SERPL CREATININE-BSD FRML MDRD: 65 ML/MIN/{1.73_M2}
GLUCOSE SERPL-MCNC: 156 MG/DL (ref 70–99)
HBA1C MFR BLD: 7.2 % (ref 0–5.6)
HDLC SERPL-MCNC: 59 MG/DL
LDLC SERPL CALC-MCNC: 29 MG/DL
MICROALBUMIN UR-MCNC: 28 MG/L
MICROALBUMIN/CREAT UR: 50.98 MG/G CR (ref 0–25)
NONHDLC SERPL-MCNC: 59 MG/DL
POTASSIUM SERPL-SCNC: 3.9 MMOL/L (ref 3.4–5.3)
SODIUM SERPL-SCNC: 139 MMOL/L (ref 133–144)
TRIGL SERPL-MCNC: 148 MG/DL
TSH SERPL DL<=0.005 MIU/L-ACNC: 2.72 MU/L (ref 0.4–4)

## 2019-04-09 PROCEDURE — 84443 ASSAY THYROID STIM HORMONE: CPT | Performed by: FAMILY MEDICINE

## 2019-04-09 PROCEDURE — 83036 HEMOGLOBIN GLYCOSYLATED A1C: CPT | Performed by: FAMILY MEDICINE

## 2019-04-09 PROCEDURE — 36415 COLL VENOUS BLD VENIPUNCTURE: CPT | Performed by: FAMILY MEDICINE

## 2019-04-09 PROCEDURE — 82043 UR ALBUMIN QUANTITATIVE: CPT | Performed by: FAMILY MEDICINE

## 2019-04-09 PROCEDURE — 80061 LIPID PANEL: CPT | Performed by: FAMILY MEDICINE

## 2019-04-09 PROCEDURE — 80048 BASIC METABOLIC PNL TOTAL CA: CPT | Performed by: FAMILY MEDICINE

## 2019-04-09 RX ORDER — PREDNISONE 1 MG/1
TABLET ORAL
Qty: 120 TABLET | Refills: 1 | Status: SHIPPED | OUTPATIENT
Start: 2019-04-09 | End: 2019-06-18

## 2019-04-09 NOTE — TELEPHONE ENCOUNTER
Reviewed provider notes, Pt to stay at 7 mg a day for 1-3 months.  Have updated prescription to reflect 7 mg a day dosing. Per protocols will refill 1 mg tablets.    Kamille Ruffin RN  Rheumatology Clinic

## 2019-04-11 ENCOUNTER — OFFICE VISIT (OUTPATIENT)
Dept: RHEUMATOLOGY | Facility: CLINIC | Age: 79
End: 2019-04-11
Attending: INTERNAL MEDICINE
Payer: MEDICARE

## 2019-04-11 VITALS
SYSTOLIC BLOOD PRESSURE: 133 MMHG | HEIGHT: 66 IN | DIASTOLIC BLOOD PRESSURE: 79 MMHG | HEART RATE: 52 BPM | OXYGEN SATURATION: 95 % | TEMPERATURE: 97.9 F | BODY MASS INDEX: 34.38 KG/M2

## 2019-04-11 DIAGNOSIS — M35.02 SJOGREN'S SYNDROME WITH LUNG INVOLVEMENT (H): Primary | ICD-10-CM

## 2019-04-11 PROCEDURE — G0463 HOSPITAL OUTPT CLINIC VISIT: HCPCS | Mod: ZF

## 2019-04-11 RX ORDER — HYDROXYCHLOROQUINE SULFATE 200 MG/1
400 TABLET, FILM COATED ORAL DAILY
Qty: 180 TABLET | Refills: 1 | Status: SHIPPED | OUTPATIENT
Start: 2019-04-11 | End: 2019-08-16

## 2019-04-11 RX ORDER — PREDNISONE 1 MG/1
TABLET ORAL
Qty: 180 TABLET | Refills: 3 | Status: SHIPPED | OUTPATIENT
Start: 2019-04-11 | End: 2019-06-18

## 2019-04-11 ASSESSMENT — PAIN SCALES - GENERAL: PAINLEVEL: MODERATE PAIN (5)

## 2019-04-11 NOTE — NURSING NOTE
"Chief Complaint   Patient presents with     RECHECK     Inflammatory arthritis     /79   Pulse 52   Temp 97.9  F (36.6  C) (Oral)   Ht 1.676 m (5' 6\")   SpO2 95%   BMI 34.38 kg/m    Malathi Meek MA    "

## 2019-04-11 NOTE — PATIENT INSTRUCTIONS
- increase prednisone for now: alternate 9 mg and 7 mg every other day. In 1 month decrease to 8 mg and 6 mg every other day.  - please have morning labs drawn in the next 1-2 weeks  - add plaquenil 400 mg/day for arthritis pain. This medicine will start working in 2-3 months, you may or may not notice any effects sooner.

## 2019-04-11 NOTE — PROGRESS NOTES
[facility]  Rheumatology Clinic  Carmenza Stringer MD  2019     Name: Betty Tee  MRN: [id number]  Age: 78 year old  : 1940  Referring provider: Referred Self     # Sjogren's syndrome since  with borderline +RG, +SSA, and lip biopsy focus score of 1.  # prednisone-responsive polyarthritis  # Follicular bronchiolitis, prior mild ILD  # Osteoporosis  # Chronic prednisone use  # DM  # A fib     Assessment and Plan:  Primary Sjogren's syndrome with ILD (H)  Sister Sita returns with stable PFTs and improvement on most recent chest CT showing bronchiolitis, but no ILD. Completed pulmonary rehab in 2019 where she was able to exercise without oxygen.     She has not been successful at tapering prednisone, however, because she develops pain in different areas when she lowers the doses: knee, neck, lower leg, hand pain. This could either be a sign of acquired adrenal insufficiency or underlying arthritis, which may or may not be Sjogren's related. Will check am cortisol and CRP, increase prednisone slightly, to alternate 9 and 7 mg every other day. Try to decrease to 8 and 6 mg in 1 month. I will also start plaquenil to help with arthritis pain. May consider celebrex for break through pain.     We discussed again the importance of lowering the steroid dose to achieve better control of diabetes, as well as diet and weight loss. She just lowered her prednisone to 7mg daily and is tolerating this fairly with mild joint aches in the knees and hips.        Follow-up: Return in 4 months.     Subjective:  Betty Tee is a 78 year old female with a history of primary Sjogren's syndrome who presents for follow-up. Her breathing has been much better, and she has not used oxygen at home, other than on very humid days or briefly to catch her breath after climbing basement stairs. She has noticed intermittent pain in the lower legs, right knee, hands, and most recently - left neck that she attributes to  tapering down prednisone. Hence she has increased prednisone to 8/7 mg every other day. Denies joint swelling. States she is not impaired by pain, but it slows her down and interferes with mood. On lab review, recent A1C increased from 6.7 in 10/2018 to 7.2        HPI:   Betty Tee is a 78 year old female with a history of primary Sjogren's syndrome who presents for follow-up. She was diagnosed with Sjogren's syndrome in 2015 with borderline +RG, +SSA, and lip biopsy focus score of 1. Associated ILD and joint pain are prednisone-responsive which support the diagnosis. Ocular staining score has been deferred given other sources of diagnosis.    Interval history: 6/7/18   Today, the patient states she is feeling short of breath as she did not bring her oxygen tank with her. She states she has been feeling a bit more short of breath at home while going up and down her basement steps recently. She last saw her pulmonologist, Dr. Walsh, on 1/10/2018 and does not have recent pulmonary function tests. She has not needed to increase her oxygen levels at home, noting she has always used 4 liters, which seems to be sufficient for her. When she is out of the house she frequently leaves her oxygen tank at home. She does currently have an irritating, non-productive cough that began approximately one week ago with associated postnasal drip. She has been experiencing night sweats and infrequent chills this week but denies any measured fevers.     She denies any joint pains today, but does state she had a flare of joint pain, specifically in her right knee, a few weeks ago. The pain hit very intensely prior to resolving.      With regards to starting rituximab, she voices she discussed this with her primary care provider who does not feel this is a good idea.     Interval history: 3/8/18  Patient reported doing rather well but did mention some trouble with intermittent arthralgias. We discussed consideration of rituximab  given problems stemming from long-term prednisone use and she elected to think about this option. Plan was made for continuation of 10mg of prednisone daily for the time being. Recommended starting Fosamax, 70mg, once per week, however she was hesitant about side effects.      Review of Systems:   Pertinent items are noted in HPI or as below, remainder of complete ROS is negative.      No recent problems with hearing or vision. No swallowing problems.   No breathing difficulty, shortness of breath, coughing, or wheezing  No chest pain or palpitations  No heart burn, indigestion, abdominal pain, nausea, vomiting, diarrhea  No urination problems, no bloody, cloudy urine, no dysuria  No numbing, tingling, weakness  No headaches or confusion  No rashes. No easy bleeding or bruising.     Active Medications:     Current Outpatient Prescriptions:      ACCU-CHEK SHANNON PLUS test strip, USE TO TEST BLOOD SUGARS 4 TIMES DAILY OR AS DIRECTED, Disp: 400 strip, Rfl: 0     acetaminophen (TYLENOL) 500 MG tablet, Take 500 mg by mouth nightly as needed , Disp: , Rfl:      acyclovir (ZOVIRAX) 400 MG tablet, Take 1 tablet (400 mg) by mouth 3 times daily For a couple days, Disp: 15 tablet, Rfl: 2     acyclovir (ZOVIRAX) 5 % external ointment, Apply topically 6 times daily As needed for outbreaks, Disp: 15 g, Rfl: 3     albuterol (PROAIR HFA, PROVENTIL HFA, VENTOLIN HFA) 108 (90 BASE) MCG/ACT inhaler, Inhale 2 puffs into the lungs every 6 hours, Disp: 1 Inhaler, Rfl: 3     alendronate (FOSAMAX) 70 MG tablet, Take 1 tablet (70 mg) by mouth every 7 days (Patient not taking: Reported on 10/22/2018), Disp: 4 tablet, Rfl: 11     atorvastatin (LIPITOR) 10 MG tablet, TAKE 1 TABLET (10 MG) BY MOUTH DAILY, Disp: 90 tablet, Rfl: 0     BASAGLAR 100 UNIT/ML injection, Inject 25 Units Subcutaneous daily (to replace lantus), Disp: 15 mL, Rfl: 1     BD JANE U/F 32G X 4 MM insulin pen needle, USE 4 DAILY AS DIRECTED., Disp: 400 each, Rfl: 1     blood  glucose monitoring (ACCU-CHEK SHANNON PLUS) test strip, Use to test blood sugar 4 times daily or as directed.  Ok to substitute alternative if insurance prefers., Disp: 120 strip, Rfl: 11     blood glucose monitoring (ACCU-CHEK FASTCLIX) lancets, Use to test blood sugar 4 times daily or as directed.  Ok to substitute alternative if insurance prefers., Disp: 1 Box, Rfl: 11     blood glucose monitoring (ACCU-CHEK MULTICLIX) lancets, Use to test blood sugar 4 times daily or as directed., Disp: 4 Box, Rfl: 3     COMPRESSION STOCKINGS, Wear compression stockings in affected leg (right leg) or both legs most of the time during the day and take them off at night., Disp: 2 each, Rfl: 2     escitalopram (LEXAPRO) 20 MG tablet, TAKE 1 TABLET (20 MG) BY MOUTH DAILY, Disp: 90 tablet, Rfl: 1     fluticasone (FLONASE) 50 MCG/ACT nasal spray, Spray 1-2 sprays into both nostrils daily as needed for allergies, Disp: 1 Bottle, Rfl: 3     fluticasone (FLOVENT HFA) 220 MCG/ACT inhaler, Inhale 2 puffs into the lungs 2 times daily, Disp: 3 Inhaler, Rfl: 3     gabapentin (NEURONTIN) 300 MG capsule, Take 1 capsule (300 mg) by mouth At Bedtime (Patient not taking: Reported on 10/22/2018), Disp: 30 capsule, Rfl: 0     Humidifier MISC, Continuous humidified air via concentrator.  Diagnosis: ILD Duration: Lifetime 99., Disp: 1 each, Rfl: 1     insulin pen needle (B-D U/F) 31G X 8 MM, Use 4 times daily (with Lantus and Novolog) or as directed., Disp: 400 each, Rfl: 3     ketoconazole (NIZORAL) 2 % external cream, Apply topically 2 times daily, Disp: 60 g, Rfl: 1     lidocaine (LIDODERM) 5 % Patch, Apply patch to painful area for up to 12 h within a 24 h period.  Remove after 12 hours. (Patient not taking: Reported on 10/22/2018), Disp: 30 patch, Rfl: 0     lisinopril (PRINIVIL/ZESTRIL) 2.5 MG tablet, Take 1 tablet (2.5 mg) by mouth daily, Disp: 90 tablet, Rfl: 2     metFORMIN (GLUCOPHAGE-XR) 500 MG 24 hr tablet, TAKE 2 TABLETS BY MOUTH DAILY WITH  DINNER, Disp: 180 tablet, Rfl: 0     metoprolol succinate (TOPROL XL) 25 MG 24 hr tablet, Take 0.5 tablets (12.5 mg) by mouth 2 times daily Take 50 mg by mouth in combination with 12.5 for a total of 62.5 twice a day, Disp: 90 tablet, Rfl: 3     metoprolol succinate (TOPROL-XL) 100 MG 24 hr tablet, Take 50 mg by mouth in combination with 12.5 for a total of 62.5 twice a day, Disp: 90 tablet, Rfl: 3     montelukast (SINGULAIR) 10 MG tablet, TAKE 1 TABLET BY MOUTH EVERYDAY AT BEDTIME, Disp: 90 tablet, Rfl: 0     NOVOLOG FLEXPEN 100 UNIT/ML soln, INJECT 12 UNITS SUBCUTANEOUS 3 TIMES DAILY (WITH MEALS), Disp: 15 mL, Rfl: 1     order for DME, Equipment being ordered: Oxygen Pulsed oxygen portable tank Rate: 4LNC Diagnosis: ILD Duration: Lifetime -99, Disp: 1 Device, Rfl: 1     order for DME, Equipment being ordered: Full face mask for CPAP - size: F10 large, Disp: 1 each, Rfl: 0     order for DME, Oxygen: Patient requires supplemental Oxygen 4 LPM via nasal canula with activity. Please provide patient with a home unit and with portability capability. Oxygen will be for a lifetime., Disp: 1 Device, Rfl: 0     potassium chloride SA (K-DUR/KLOR-CON M) 20 MEQ CR tablet, Take 2 tabs (40 meq) in am and 1 tab (20 meq) in pm daily, Disp: 270 tablet, Rfl: 3     predniSONE (DELTASONE) 1 MG tablet, Use with 5 mg tabs to taper: 10 mg and 9 mg every other day for 1m, 9 mg/day for 1m, 9 mg and 8 mg every other day for 1m, etc. until at 7mg, Disp: 120 tablet, Rfl: 6     predniSONE (DELTASONE) 5 MG tablet, Use with 1 mg tabs to taper: 10 mg and 9 mg every other day for 1m, 9 mg/day for 1m, 9 mg and 8 mg every other day for 1m, etc. until at 7mg, Disp: 30 tablet, Rfl: 6     spironolactone (ALDACTONE) 25 MG tablet, Take 2 tablets (50 mg) by mouth daily, Disp: 180 tablet, Rfl: 3     tiZANidine (ZANAFLEX) 2 MG tablet, Take 1-2 tablets (2-4 mg) by mouth 3 times daily, Disp: 90 tablet, Rfl: 1     torsemide (DEMADEX) 20 MG tablet, Take 40 mg  in the morning, 30 mg in the afternoon., Disp: 105 tablet, Rfl: 3     traZODone (DESYREL) 50 MG tablet, Take 0.5-1 tablets (25-50 mg) by mouth nightly as needed for sleep, Disp: 30 tablet, Rfl: 5     warfarin (COUMADIN) 7.5 MG tablet, TAKE 1 TABLET BY MOUTH DAILY. CURRENT DOSE IS 7.5 MG DAILY. DOSE ADJUSTED PER INR RESULT., Disp: 90 tablet, Rfl: 3      Allergies:   Augmentin\  Codeine  Phenobarbital  Seasonal allergies      Past Medical History:  Alcohol abuse, in remission.   Allergic rhinitis.   Antiplatelet long-term use.   Atrial fibrillation.   Cardiomegaly.   Osteopenia.   Diverticulosis.   Benign essential hypertension.   GERD.   Insomnia.   Irregular heart beat.   Lumbago.   Major depressive disorder, recurrent episode, moderate.   ANGELICA.  Osteoarthrosis.   Sjogren's syndrome.   Sleep apnea.   Tobacco use disorder.   TMJ disorder.   RLS.  Major depressive disorder.   Hypertension.   Sciatica.   Colouterine fistula.   Left ventricular hypertrophy.   Breat fibroadenoma.   DVT.   Fatty liver disease, nonalcoholic.   Rib pain.   Neck mass.   Interstitial lung disease.   Acute and chronic respiratory failure with hypoxia.   Type II diabetes mellitus.   Hyperlipidemia.   Inflammatory arthritis.      Past Surgical History:  Back surgery.   Left breast biopsy.   Appendectomy.   Exploratory laparotomy.   Cardiac surgery.   Cholecystectomy.   Left colectomy.   Total abdominal hysterectomy with bilateral salpingo-oophorectomy.   Insert ureter stent.   Flexible sigmoidoscopy.   Ileostomy takedown.     Family History:   Mother: Positive for CAD, heart disease, hypertension, cerebrovascular disease, hyperlipidemia.   Father: Positive for alcohol/drug abuse, Alzheimer disease, dementia, hypertension, hyperlipidemia.   Sister: Positive for CAD, hypertension, and colorectal cancer.   Sister: Positive for hypertension and hyperlipidemia.   Sister: Positive for diabetes, lung cancer, asthma, and heart disease.   Brother:  Positive for Parkinsonism and substance abuse.   Brother: Positive for hypertension, diverticulitis, and prostate cancer.   Daughter: Positive for breast cancer.        Social History:   She is a former smoker; quit in 2011. She has a history of alcohol abuse but stopped drinking in 1986.      Physical Exam:   There were no vitals taken for this visit.   Wt Readings from Last 4 Encounters:   04/04/19 96.6 kg (213 lb)   03/01/19 96.2 kg (212 lb)   02/26/19 96.2 kg (212 lb)   02/14/19 95.7 kg (211 lb)     Constitutional: Well-developed, appearing stated age; cooperative  Eyes: Normal EOM, PERRLA, vision, conjunctiva, sclera  ENT: Normal external ears, nose, hearing, lips, teeth, gums, throat. No mucous membrane lesions, normal saliva pool  Neck: No mass or thyroid enlargement  Resp: Good air movement, + mild scattered wheeze in the upper lobes BL  CV: no murmurs, rubs or gallops, no edema. Irregular rate, normal rhythm.   GI: No ABD mass or tenderness, no HSM  : Not tested  Lymph: No cervical, supraclavicular, inguinal or epitrochlear nodes  MS: The TMJ, neck, shoulder, elbow, wrist, MCP/PIP/DIP, spine, hip, knee, ankle, and foot MTP/IP joints were examined and found normal. No active synovitis or altered joint anatomy. Full joint ROM. Normal  strength. No dactylitis,  tenosynovitis, enthesopathy.  Skin: No nail pitting, alopecia, rash, nodules or lesions  Neuro: grossly non-focal  Psych: Normal judgement, orientation, memory, affect.    Images:  CT Chest w/o contrast (7/25/18):  Findings remain most consistent with small airway disease  and/or nonclassifiable interstitial lung disease and are not  significantly changed from the March 2017 comparison.    Per radiology.    Laboratory:   RHEUM RESULTS Latest Ref Rng & Units 10/22/2018 2/13/2019 4/9/2019   SED RATE 0 - 30 mm/h - - -   CRP, INFLAMMATION 0.0 - 8.0 mg/L - - -   CK TOTAL 30 - 225 U/L - - -   RHEUMATOID FACTOR <20 IU/mL - - -   RG SCREEN BY EIA <1.0 -  - -   AST 0 - 45 U/L - - -   ALT 0 - 50 U/L - - -   ALBUMIN 3.4 - 5.0 g/dL - - -   WBC 4.0 - 11.0 10e9/L - - -   RBC 3.8 - 5.2 10e12/L - - -   HGB 11.7 - 15.7 g/dL - - -   HCT 35.0 - 47.0 % - - -   MCV 78 - 100 fl - - -   MCHC 31.5 - 36.5 g/dL - - -   RDW 10.0 - 15.0 % - - -    - 450 10e9/L - - -   CREATININE 0.52 - 1.04 mg/dL 0.97 0.87 0.85   GFR ESTIMATE, IF BLACK >60 mL/min/[1.73:m2] 67 74 76   GFR ESTIMATE >60 mL/min/[1.73:m2] 55(L) 64 65    - 1,620 mg/dL - - -       Rheumatoid Factor   Date Value Ref Range Status   07/24/2015 <20 <20 IU/mL Final   ,  ,   Cyclic Cit Pept IgG/IgA   Date Value Ref Range Status   07/24/2015 <20  Interpretation:  Negative   <20 UNITS Final   ,  ,   Scleroderma Antibody Scl-70 CECILIA IgG   Date Value Ref Range Status   07/24/2015  0.0 - 0.9 AI Final    <0.2  Negative   Antibody index (AI) values reflect qualitative changes in antibody   concentration that cannot be directly associated with clinical condition or   disease state.       SSA (Ro) (CECILIA) Antibody, IgG   Date Value Ref Range Status   07/24/2015 1.6 (H) 0.0 - 0.9 AI Final     Comment:     Positive   Antibody index (AI) values reflect qualitative changes in antibody   concentration that cannot be directly associated with clinical condition or   disease state.       SSB (La) (CECILIA) Antibody, IgG   Date Value Ref Range Status   07/24/2015  0.0 - 0.9 AI Final    <0.2  Negative   Antibody index (AI) values reflect qualitative changes in antibody   concentration that cannot be directly associated with clinical condition or   disease state.       ,  ,   RG Screen by EIA   Date Value Ref Range Status   07/24/2015 <1.0  Interpretation:  Negative   <1.0 Final   ,  ,  ,  ,  ,  ,  ,  ,  ,  ,  ,  ,  ,  ,  ,  ,  ,  ,   Neutrophil Cytoplasmic IgG Antibody   Date Value Ref Range Status   07/24/2015   Final    <1:20  Reference range: <1:20  (Note)  The ANCA IFA is <1:20; therefore, no further testing will  be  performed.  INTERPRETIVE INFORMATION: Anti-Neutrophil Cyto Ab, IgG  Neutrophil Cytoplasmic Antibodies (C-ANCA = granular  cytoplasmic staining, P-ANCA = perinuclear staining) are  found in the serum of over 90 percent of patients with  certain necrotizing systemic vasculitides, and usually in  less than 5 percent of patients with collagen vascular  disease or arthritis.  Performed by [facility],  [address] [place] [id number] [phone]  [email or web address], Burke Mckeon MD, Lab. Director       ,  ,  ,  ,  ,  ,  ,   IGG   Date Value Ref Range Status   07/24/2015 1320 695 - 1620 mg/dL Final   ,  ,  ,  ,  ,   Scleroderma Antibody Scl-70 CECILIA IgG   Date Value Ref Range Status   07/24/2015  0.0 - 0.9 AI Final    <0.2  Negative   Antibody index (AI) values reflect qualitative changes in antibody   concentration that cannot be directly associated with clinical condition or   disease state.         I reviewed and edited the above scribed note to reflect my findings and plan.     I discussed the findings and recommendations with the patient.  Recommendations were discussed with the consulting team.  Time spent: 30 minutes    Carmenza Stringer MD, MS  Rheumatology Attending Physician  pgr [phone]

## 2019-04-11 NOTE — LETTER
2019      RE: Betty Tee  [address]  [facility] [address]       [facility]  Rheumatology Clinic  Carmenza Stringer MD  2019     Name: Betty Tee  MRN: [id number]  Age: 78 year old  : 1940  Referring provider: Referred Self     # Sjogren's syndrome since 2015 with borderline +RG, +SSA, and lip biopsy focus score of 1.  # prednisone-responsive polyarthritis  # Follicular bronchiolitis, prior mild ILD  # Osteoporosis  # Chronic prednisone use  # DM  # A fib     Assessment and Plan:  Primary Sjogren's syndrome with ILD (H)  Sister Sita returns with stable PFTs and improvement on most recent chest CT showing bronchiolitis, but no ILD. Completed pulmonary rehab in 2019 where she was able to exercise without oxygen.     She has not been successful at tapering prednisone, however, because she develops pain in different areas when she lowers the doses: knee, neck, lower leg, hand pain. This could either be a sign of acquired adrenal insufficiency or underlying arthritis, which may or may not be Sjogren's related. Will check am cortisol and CRP, increase prednisone slightly, to alternate 9 and 7 mg every other day. Try to decrease to 8 and 6 mg in 1 month. I will also start plaquenil to help with arthritis pain. May consider celebrex for break through pain.     We discussed again the importance of lowering the steroid dose to achieve better control of diabetes, as well as diet and weight loss. She just lowered her prednisone to 7mg daily and is tolerating this fairly with mild joint aches in the knees and hips.        Follow-up: Return in 4 months.     Subjective:  Betty Tee is a 78 year old female with a history of primary Sjogren's syndrome who presents for follow-up. Her breathing has been much better, and she has not used oxygen at home, other than on very humid days or briefly to catch her breath after climbing basement stairs. She has noticed intermittent pain in  the lower legs, right knee, hands, and most recently - left neck that she attributes to tapering down prednisone. Hence she has increased prednisone to 8/7 mg every other day. Denies joint swelling. States she is not impaired by pain, but it slows her down and interferes with mood. On lab review, recent A1C increased from 6.7 in 10/2018 to 7.2        HPI:   Betty Tee is a 78 year old female with a history of primary Sjogren's syndrome who presents for follow-up. She was diagnosed with Sjogren's syndrome in 2015 with borderline +RG, +SSA, and lip biopsy focus score of 1. Associated ILD and joint pain are prednisone-responsive which support the diagnosis. Ocular staining score has been deferred given other sources of diagnosis.    Interval history: 6/7/18   Today, the patient states she is feeling short of breath as she did not bring her oxygen tank with her. She states she has been feeling a bit more short of breath at home while going up and down her basement steps recently. She last saw her pulmonologist, Dr. Walsh, on 1/10/2018 and does not have recent pulmonary function tests. She has not needed to increase her oxygen levels at home, noting she has always used 4 liters, which seems to be sufficient for her. When she is out of the house she frequently leaves her oxygen tank at home. She does currently have an irritating, non-productive cough that began approximately one week ago with associated postnasal drip. She has been experiencing night sweats and infrequent chills this week but denies any measured fevers.     She denies any joint pains today, but does state she had a flare of joint pain, specifically in her right knee, a few weeks ago. The pain hit very intensely prior to resolving.      With regards to starting rituximab, she voices she discussed this with her primary care provider who does not feel this is a good idea.     Interval history: 3/8/18  Patient reported doing rather well but did  mention some trouble with intermittent arthralgias. We discussed consideration of rituximab given problems stemming from long-term prednisone use and she elected to think about this option. Plan was made for continuation of 10mg of prednisone daily for the time being. Recommended starting Fosamax, 70mg, once per week, however she was hesitant about side effects.      Review of Systems:   Pertinent items are noted in HPI or as below, remainder of complete ROS is negative.      No recent problems with hearing or vision. No swallowing problems.   No breathing difficulty, shortness of breath, coughing, or wheezing  No chest pain or palpitations  No heart burn, indigestion, abdominal pain, nausea, vomiting, diarrhea  No urination problems, no bloody, cloudy urine, no dysuria  No numbing, tingling, weakness  No headaches or confusion  No rashes. No easy bleeding or bruising.     Active Medications:     Current Outpatient Prescriptions:      ACCU-CHEK SHANNON PLUS test strip, USE TO TEST BLOOD SUGARS 4 TIMES DAILY OR AS DIRECTED, Disp: 400 strip, Rfl: 0     acetaminophen (TYLENOL) 500 MG tablet, Take 500 mg by mouth nightly as needed , Disp: , Rfl:      acyclovir (ZOVIRAX) 400 MG tablet, Take 1 tablet (400 mg) by mouth 3 times daily For a couple days, Disp: 15 tablet, Rfl: 2     acyclovir (ZOVIRAX) 5 % external ointment, Apply topically 6 times daily As needed for outbreaks, Disp: 15 g, Rfl: 3     albuterol (PROAIR HFA, PROVENTIL HFA, VENTOLIN HFA) 108 (90 BASE) MCG/ACT inhaler, Inhale 2 puffs into the lungs every 6 hours, Disp: 1 Inhaler, Rfl: 3     alendronate (FOSAMAX) 70 MG tablet, Take 1 tablet (70 mg) by mouth every 7 days (Patient not taking: Reported on 10/22/2018), Disp: 4 tablet, Rfl: 11     atorvastatin (LIPITOR) 10 MG tablet, TAKE 1 TABLET (10 MG) BY MOUTH DAILY, Disp: 90 tablet, Rfl: 0     BASAGLAR 100 UNIT/ML injection, Inject 25 Units Subcutaneous daily (to replace lantus), Disp: 15 mL, Rfl: 1     BD JANE U/F  32G X 4 MM insulin pen needle, USE 4 DAILY AS DIRECTED., Disp: 400 each, Rfl: 1     blood glucose monitoring (ACCU-CHEK SHANNON PLUS) test strip, Use to test blood sugar 4 times daily or as directed.  Ok to substitute alternative if insurance prefers., Disp: 120 strip, Rfl: 11     blood glucose monitoring (ACCU-CHEK FASTCLIX) lancets, Use to test blood sugar 4 times daily or as directed.  Ok to substitute alternative if insurance prefers., Disp: 1 Box, Rfl: 11     blood glucose monitoring (ACCU-CHEK MULTICLIX) lancets, Use to test blood sugar 4 times daily or as directed., Disp: 4 Box, Rfl: 3     COMPRESSION STOCKINGS, Wear compression stockings in affected leg (right leg) or both legs most of the time during the day and take them off at night., Disp: 2 each, Rfl: 2     escitalopram (LEXAPRO) 20 MG tablet, TAKE 1 TABLET (20 MG) BY MOUTH DAILY, Disp: 90 tablet, Rfl: 1     fluticasone (FLONASE) 50 MCG/ACT nasal spray, Spray 1-2 sprays into both nostrils daily as needed for allergies, Disp: 1 Bottle, Rfl: 3     fluticasone (FLOVENT HFA) 220 MCG/ACT inhaler, Inhale 2 puffs into the lungs 2 times daily, Disp: 3 Inhaler, Rfl: 3     gabapentin (NEURONTIN) 300 MG capsule, Take 1 capsule (300 mg) by mouth At Bedtime (Patient not taking: Reported on 10/22/2018), Disp: 30 capsule, Rfl: 0     Humidifier MISC, Continuous humidified air via concentrator.  Diagnosis: ILD Duration: Lifetime 99., Disp: 1 each, Rfl: 1     insulin pen needle (B-D U/F) 31G X 8 MM, Use 4 times daily (with Lantus and Novolog) or as directed., Disp: 400 each, Rfl: 3     ketoconazole (NIZORAL) 2 % external cream, Apply topically 2 times daily, Disp: 60 g, Rfl: 1     lidocaine (LIDODERM) 5 % Patch, Apply patch to painful area for up to 12 h within a 24 h period.  Remove after 12 hours. (Patient not taking: Reported on 10/22/2018), Disp: 30 patch, Rfl: 0     lisinopril (PRINIVIL/ZESTRIL) 2.5 MG tablet, Take 1 tablet (2.5 mg) by mouth daily, Disp: 90 tablet,  Rfl: 2     metFORMIN (GLUCOPHAGE-XR) 500 MG 24 hr tablet, TAKE 2 TABLETS BY MOUTH DAILY WITH DINNER, Disp: 180 tablet, Rfl: 0     metoprolol succinate (TOPROL XL) 25 MG 24 hr tablet, Take 0.5 tablets (12.5 mg) by mouth 2 times daily Take 50 mg by mouth in combination with 12.5 for a total of 62.5 twice a day, Disp: 90 tablet, Rfl: 3     metoprolol succinate (TOPROL-XL) 100 MG 24 hr tablet, Take 50 mg by mouth in combination with 12.5 for a total of 62.5 twice a day, Disp: 90 tablet, Rfl: 3     montelukast (SINGULAIR) 10 MG tablet, TAKE 1 TABLET BY MOUTH EVERYDAY AT BEDTIME, Disp: 90 tablet, Rfl: 0     NOVOLOG FLEXPEN 100 UNIT/ML soln, INJECT 12 UNITS SUBCUTANEOUS 3 TIMES DAILY (WITH MEALS), Disp: 15 mL, Rfl: 1     order for DME, Equipment being ordered: Oxygen Pulsed oxygen portable tank Rate: 4LNC Diagnosis: ILD Duration: Lifetime -99, Disp: 1 Device, Rfl: 1     order for DME, Equipment being ordered: Full face mask for CPAP - size: F10 large, Disp: 1 each, Rfl: 0     order for DME, Oxygen: Patient requires supplemental Oxygen 4 LPM via nasal canula with activity. Please provide patient with a home unit and with portability capability. Oxygen will be for a lifetime., Disp: 1 Device, Rfl: 0     potassium chloride SA (K-DUR/KLOR-CON M) 20 MEQ CR tablet, Take 2 tabs (40 meq) in am and 1 tab (20 meq) in pm daily, Disp: 270 tablet, Rfl: 3     predniSONE (DELTASONE) 1 MG tablet, Use with 5 mg tabs to taper: 10 mg and 9 mg every other day for 1m, 9 mg/day for 1m, 9 mg and 8 mg every other day for 1m, etc. until at 7mg, Disp: 120 tablet, Rfl: 6     predniSONE (DELTASONE) 5 MG tablet, Use with 1 mg tabs to taper: 10 mg and 9 mg every other day for 1m, 9 mg/day for 1m, 9 mg and 8 mg every other day for 1m, etc. until at 7mg, Disp: 30 tablet, Rfl: 6     spironolactone (ALDACTONE) 25 MG tablet, Take 2 tablets (50 mg) by mouth daily, Disp: 180 tablet, Rfl: 3     tiZANidine (ZANAFLEX) 2 MG tablet, Take 1-2 tablets (2-4 mg) by  mouth 3 times daily, Disp: 90 tablet, Rfl: 1     torsemide (DEMADEX) 20 MG tablet, Take 40 mg in the morning, 30 mg in the afternoon., Disp: 105 tablet, Rfl: 3     traZODone (DESYREL) 50 MG tablet, Take 0.5-1 tablets (25-50 mg) by mouth nightly as needed for sleep, Disp: 30 tablet, Rfl: 5     warfarin (COUMADIN) 7.5 MG tablet, TAKE 1 TABLET BY MOUTH DAILY. CURRENT DOSE IS 7.5 MG DAILY. DOSE ADJUSTED PER INR RESULT., Disp: 90 tablet, Rfl: 3      Allergies:   Augmentin\  Codeine  Phenobarbital  Seasonal allergies      Past Medical History:  Alcohol abuse, in remission.   Allergic rhinitis.   Antiplatelet long-term use.   Atrial fibrillation.   Cardiomegaly.   Osteopenia.   Diverticulosis.   Benign essential hypertension.   GERD.   Insomnia.   Irregular heart beat.   Lumbago.   Major depressive disorder, recurrent episode, moderate.   ANGELICA.  Osteoarthrosis.   Sjogren's syndrome.   Sleep apnea.   Tobacco use disorder.   TMJ disorder.   RLS.  Major depressive disorder.   Hypertension.   Sciatica.   Colouterine fistula.   Left ventricular hypertrophy.   Breat fibroadenoma.   DVT.   Fatty liver disease, nonalcoholic.   Rib pain.   Neck mass.   Interstitial lung disease.   Acute and chronic respiratory failure with hypoxia.   Type II diabetes mellitus.   Hyperlipidemia.   Inflammatory arthritis.      Past Surgical History:  Back surgery.   Left breast biopsy.   Appendectomy.   Exploratory laparotomy.   Cardiac surgery.   Cholecystectomy.   Left colectomy.   Total abdominal hysterectomy with bilateral salpingo-oophorectomy.   Insert ureter stent.   Flexible sigmoidoscopy.   Ileostomy takedown.     Family History:   Mother: Positive for CAD, heart disease, hypertension, cerebrovascular disease, hyperlipidemia.   Father: Positive for alcohol/drug abuse, Alzheimer disease, dementia, hypertension, hyperlipidemia.   Sister: Positive for CAD, hypertension, and colorectal cancer.   Sister: Positive for hypertension and  hyperlipidemia.   Sister: Positive for diabetes, lung cancer, asthma, and heart disease.   Brother: Positive for Parkinsonism and substance abuse.   Brother: Positive for hypertension, diverticulitis, and prostate cancer.   Daughter: Positive for breast cancer.        Social History:   She is a former smoker; quit in 2011. She has a history of alcohol abuse but stopped drinking in 1986.      Physical Exam:   There were no vitals taken for this visit.   Wt Readings from Last 4 Encounters:   04/04/19 96.6 kg (213 lb)   03/01/19 96.2 kg (212 lb)   02/26/19 96.2 kg (212 lb)   02/14/19 95.7 kg (211 lb)     Constitutional: Well-developed, appearing stated age; cooperative  Eyes: Normal EOM, PERRLA, vision, conjunctiva, sclera  ENT: Normal external ears, nose, hearing, lips, teeth, gums, throat. No mucous membrane lesions, normal saliva pool  Neck: No mass or thyroid enlargement  Resp: Good air movement, + mild scattered wheeze in the upper lobes BL  CV: no murmurs, rubs or gallops, no edema. Irregular rate, normal rhythm.   GI: No ABD mass or tenderness, no HSM  : Not tested  Lymph: No cervical, supraclavicular, inguinal or epitrochlear nodes  MS: The TMJ, neck, shoulder, elbow, wrist, MCP/PIP/DIP, spine, hip, knee, ankle, and foot MTP/IP joints were examined and found normal. No active synovitis or altered joint anatomy. Full joint ROM. Normal  strength. No dactylitis,  tenosynovitis, enthesopathy.  Skin: No nail pitting, alopecia, rash, nodules or lesions  Neuro: grossly non-focal  Psych: Normal judgement, orientation, memory, affect.    Images:  CT Chest w/o contrast (7/25/18):  Findings remain most consistent with small airway disease  and/or nonclassifiable interstitial lung disease and are not  significantly changed from the March 2017 comparison.    Per radiology.    Laboratory:   RHEUM RESULTS Latest Ref Rng & Units 10/22/2018 2/13/2019 4/9/2019   SED RATE 0 - 30 mm/h - - -   CRP, INFLAMMATION 0.0 - 8.0 mg/L  - - -   CK TOTAL 30 - 225 U/L - - -   RHEUMATOID FACTOR <20 IU/mL - - -   RG SCREEN BY EIA <1.0 - - -   AST 0 - 45 U/L - - -   ALT 0 - 50 U/L - - -   ALBUMIN 3.4 - 5.0 g/dL - - -   WBC 4.0 - 11.0 10e9/L - - -   RBC 3.8 - 5.2 10e12/L - - -   HGB 11.7 - 15.7 g/dL - - -   HCT 35.0 - 47.0 % - - -   MCV 78 - 100 fl - - -   MCHC 31.5 - 36.5 g/dL - - -   RDW 10.0 - 15.0 % - - -    - 450 10e9/L - - -   CREATININE 0.52 - 1.04 mg/dL 0.97 0.87 0.85   GFR ESTIMATE, IF BLACK >60 mL/min/[1.73:m2] 67 74 76   GFR ESTIMATE >60 mL/min/[1.73:m2] 55(L) 64 65    - 1,620 mg/dL - - -       Rheumatoid Factor   Date Value Ref Range Status   07/24/2015 <20 <20 IU/mL Final   ,  ,   Cyclic Cit Pept IgG/IgA   Date Value Ref Range Status   07/24/2015 <20  Interpretation:  Negative   <20 UNITS Final   ,  ,   Scleroderma Antibody Scl-70 CECILIA IgG   Date Value Ref Range Status   07/24/2015  0.0 - 0.9 AI Final    <0.2  Negative   Antibody index (AI) values reflect qualitative changes in antibody   concentration that cannot be directly associated with clinical condition or   disease state.       SSA (Ro) (CECILIA) Antibody, IgG   Date Value Ref Range Status   07/24/2015 1.6 (H) 0.0 - 0.9 AI Final     Comment:     Positive   Antibody index (AI) values reflect qualitative changes in antibody   concentration that cannot be directly associated with clinical condition or   disease state.       SSB (La) (CECILIA) Antibody, IgG   Date Value Ref Range Status   07/24/2015  0.0 - 0.9 AI Final    <0.2  Negative   Antibody index (AI) values reflect qualitative changes in antibody   concentration that cannot be directly associated with clinical condition or   disease state.       ,  ,   RG Screen by EIA   Date Value Ref Range Status   07/24/2015 <1.0  Interpretation:  Negative   <1.0 Final   ,  ,  ,  ,  ,  ,  ,  ,  ,  ,  ,  ,  ,  ,  ,  ,  ,  ,   Neutrophil Cytoplasmic IgG Antibody   Date Value Ref Range Status   07/24/2015   Final    <1:20  Reference range:  <1:20  (Note)  The ANCA IFA is <1:20; therefore, no further testing will  be performed.  INTERPRETIVE INFORMATION: Anti-Neutrophil Cyto Ab, IgG  Neutrophil Cytoplasmic Antibodies (C-ANCA = granular  cytoplasmic staining, P-ANCA = perinuclear staining) are  found in the serum of over 90 percent of patients with  certain necrotizing systemic vasculitides, and usually in  less than 5 percent of patients with collagen vascular  disease or arthritis.  Performed by [facility],  [address] [place] [id number] [phone]  [email or web address] Burke Mckeon MD, Lab. Director       ,  ,  ,  ,  ,  ,  ,   IGG   Date Value Ref Range Status   07/24/2015 1320 695 - 1620 mg/dL Final   ,  ,  ,  ,  ,   Scleroderma Antibody Scl-70 CECILIA IgG   Date Value Ref Range Status   07/24/2015  0.0 - 0.9 AI Final    <0.2  Negative   Antibody index (AI) values reflect qualitative changes in antibody   concentration that cannot be directly associated with clinical condition or   disease state.         I reviewed and edited the above scribed note to reflect my findings and plan.     I discussed the findings and recommendations with the patient.  Recommendations were discussed with the consulting team.  Time spent: 30 minutes    Carmenza Stringer MD, MS  Rheumatology Attending Physician  pgr [phone]

## 2019-04-11 NOTE — RESULT ENCOUNTER NOTE
Here are your lab results from your recent lab visit.  We'll talk about them at your upcoming appointment.  Lev Langley MD

## 2019-04-11 NOTE — LETTER
2019       RE: Betty Tee  [address]  [facility] [address]     Dear Colleague,    Thank you for referring your patient, Betty Tee, to the [facility] RHEUMATOLOGY at [facility]. Please see a copy of my visit note below.    [facility]  Rheumatology Clinic  Cramenza Stringer MD  2019     Name: Betty Tee  MRN: [id number]  Age: 78 year old  : 1940  Referring provider: Referred Self     # Sjogren's syndrome since  with borderline +RG, +SSA, and lip biopsy focus score of 1.  # prednisone-responsive polyarthritis  # Follicular bronchiolitis, prior mild ILD  # Osteoporosis  # Chronic prednisone use  # DM  # A fib     Assessment and Plan:  Primary Sjogren's syndrome with ILD (H)  Sister Sita returns with stable PFTs and improvement on most recent chest CT showing bronchiolitis, but no ILD. Completed pulmonary rehab in 2019 where she was able to exercise without oxygen.     She has not been successful at tapering prednisone, however, because she develops pain in different areas when she lowers the doses: knee, neck, lower leg, hand pain. This could either be a sign of acquired adrenal insufficiency or underlying arthritis, which may or may not be Sjogren's related. Will check am cortisol and CRP, increase prednisone slightly, to alternate 9 and 7 mg every other day. Try to decrease to 8 and 6 mg in 1 month. I will also start plaquenil to help with arthritis pain. May consider celebrex for break through pain.     We discussed again the importance of lowering the steroid dose to achieve better control of diabetes, as well as diet and weight loss. She just lowered her prednisone to 7mg daily and is tolerating this fairly with mild joint aches in the knees and hips.        Follow-up: Return in 4 months.     Subjective:  Betty Tee is a 78 year old female with a history of primary Sjogren's syndrome who presents for follow-up. Her  breathing has been much better, and she has not used oxygen at home, other than on very humid days or briefly to catch her breath after climbing basement stairs. She has noticed intermittent pain in the lower legs, right knee, hands, and most recently - left neck that she attributes to tapering down prednisone. Hence she has increased prednisone to 8/7 mg every other day. Denies joint swelling. States she is not impaired by pain, but it slows her down and interferes with mood. On lab review, recent A1C increased from 6.7 in 10/2018 to 7.2        HPI:   Betty Tee is a 78 year old female with a history of primary Sjogren's syndrome who presents for follow-up. She was diagnosed with Sjogren's syndrome in 2015 with borderline +RG, +SSA, and lip biopsy focus score of 1. Associated ILD and joint pain are prednisone-responsive which support the diagnosis. Ocular staining score has been deferred given other sources of diagnosis.    Interval history: 6/7/18   Today, the patient states she is feeling short of breath as she did not bring her oxygen tank with her. She states she has been feeling a bit more short of breath at home while going up and down her basement steps recently. She last saw her pulmonologist, Dr. Walsh, on 1/10/2018 and does not have recent pulmonary function tests. She has not needed to increase her oxygen levels at home, noting she has always used 4 liters, which seems to be sufficient for her. When she is out of the house she frequently leaves her oxygen tank at home. She does currently have an irritating, non-productive cough that began approximately one week ago with associated postnasal drip. She has been experiencing night sweats and infrequent chills this week but denies any measured fevers.     She denies any joint pains today, but does state she had a flare of joint pain, specifically in her right knee, a few weeks ago. The pain hit very intensely prior to resolving.      With regards to  starting rituximab, she voices she discussed this with her primary care provider who does not feel this is a good idea.     Interval history: 3/8/18  Patient reported doing rather well but did mention some trouble with intermittent arthralgias. We discussed consideration of rituximab given problems stemming from long-term prednisone use and she elected to think about this option. Plan was made for continuation of 10mg of prednisone daily for the time being. Recommended starting Fosamax, 70mg, once per week, however she was hesitant about side effects.      Review of Systems:   Pertinent items are noted in HPI or as below, remainder of complete ROS is negative.      No recent problems with hearing or vision. No swallowing problems.   No breathing difficulty, shortness of breath, coughing, or wheezing  No chest pain or palpitations  No heart burn, indigestion, abdominal pain, nausea, vomiting, diarrhea  No urination problems, no bloody, cloudy urine, no dysuria  No numbing, tingling, weakness  No headaches or confusion  No rashes. No easy bleeding or bruising.     Active Medications:     Current Outpatient Prescriptions:      ACCU-CHEK SHANNON PLUS test strip, USE TO TEST BLOOD SUGARS 4 TIMES DAILY OR AS DIRECTED, Disp: 400 strip, Rfl: 0     acetaminophen (TYLENOL) 500 MG tablet, Take 500 mg by mouth nightly as needed , Disp: , Rfl:      acyclovir (ZOVIRAX) 400 MG tablet, Take 1 tablet (400 mg) by mouth 3 times daily For a couple days, Disp: 15 tablet, Rfl: 2     acyclovir (ZOVIRAX) 5 % external ointment, Apply topically 6 times daily As needed for outbreaks, Disp: 15 g, Rfl: 3     albuterol (PROAIR HFA, PROVENTIL HFA, VENTOLIN HFA) 108 (90 BASE) MCG/ACT inhaler, Inhale 2 puffs into the lungs every 6 hours, Disp: 1 Inhaler, Rfl: 3     alendronate (FOSAMAX) 70 MG tablet, Take 1 tablet (70 mg) by mouth every 7 days (Patient not taking: Reported on 10/22/2018), Disp: 4 tablet, Rfl: 11     atorvastatin (LIPITOR) 10 MG  tablet, TAKE 1 TABLET (10 MG) BY MOUTH DAILY, Disp: 90 tablet, Rfl: 0     BASAGLAR 100 UNIT/ML injection, Inject 25 Units Subcutaneous daily (to replace lantus), Disp: 15 mL, Rfl: 1     BD JANE U/F 32G X 4 MM insulin pen needle, USE 4 DAILY AS DIRECTED., Disp: 400 each, Rfl: 1     blood glucose monitoring (ACCU-CHEK SHANNON PLUS) test strip, Use to test blood sugar 4 times daily or as directed.  Ok to substitute alternative if insurance prefers., Disp: 120 strip, Rfl: 11     blood glucose monitoring (ACCU-CHEK FASTCLIX) lancets, Use to test blood sugar 4 times daily or as directed.  Ok to substitute alternative if insurance prefers., Disp: 1 Box, Rfl: 11     blood glucose monitoring (ACCU-CHEK MULTICLIX) lancets, Use to test blood sugar 4 times daily or as directed., Disp: 4 Box, Rfl: 3     COMPRESSION STOCKINGS, Wear compression stockings in affected leg (right leg) or both legs most of the time during the day and take them off at night., Disp: 2 each, Rfl: 2     escitalopram (LEXAPRO) 20 MG tablet, TAKE 1 TABLET (20 MG) BY MOUTH DAILY, Disp: 90 tablet, Rfl: 1     fluticasone (FLONASE) 50 MCG/ACT nasal spray, Spray 1-2 sprays into both nostrils daily as needed for allergies, Disp: 1 Bottle, Rfl: 3     fluticasone (FLOVENT HFA) 220 MCG/ACT inhaler, Inhale 2 puffs into the lungs 2 times daily, Disp: 3 Inhaler, Rfl: 3     gabapentin (NEURONTIN) 300 MG capsule, Take 1 capsule (300 mg) by mouth At Bedtime (Patient not taking: Reported on 10/22/2018), Disp: 30 capsule, Rfl: 0     Humidifier MISC, Continuous humidified air via concentrator.  Diagnosis: ILD Duration: Lifetime 99., Disp: 1 each, Rfl: 1     insulin pen needle (B-D U/F) 31G X 8 MM, Use 4 times daily (with Lantus and Novolog) or as directed., Disp: 400 each, Rfl: 3     ketoconazole (NIZORAL) 2 % external cream, Apply topically 2 times daily, Disp: 60 g, Rfl: 1     lidocaine (LIDODERM) 5 % Patch, Apply patch to painful area for up to 12 h within a 24 h period.   Remove after 12 hours. (Patient not taking: Reported on 10/22/2018), Disp: 30 patch, Rfl: 0     lisinopril (PRINIVIL/ZESTRIL) 2.5 MG tablet, Take 1 tablet (2.5 mg) by mouth daily, Disp: 90 tablet, Rfl: 2     metFORMIN (GLUCOPHAGE-XR) 500 MG 24 hr tablet, TAKE 2 TABLETS BY MOUTH DAILY WITH DINNER, Disp: 180 tablet, Rfl: 0     metoprolol succinate (TOPROL XL) 25 MG 24 hr tablet, Take 0.5 tablets (12.5 mg) by mouth 2 times daily Take 50 mg by mouth in combination with 12.5 for a total of 62.5 twice a day, Disp: 90 tablet, Rfl: 3     metoprolol succinate (TOPROL-XL) 100 MG 24 hr tablet, Take 50 mg by mouth in combination with 12.5 for a total of 62.5 twice a day, Disp: 90 tablet, Rfl: 3     montelukast (SINGULAIR) 10 MG tablet, TAKE 1 TABLET BY MOUTH EVERYDAY AT BEDTIME, Disp: 90 tablet, Rfl: 0     NOVOLOG FLEXPEN 100 UNIT/ML soln, INJECT 12 UNITS SUBCUTANEOUS 3 TIMES DAILY (WITH MEALS), Disp: 15 mL, Rfl: 1     order for DME, Equipment being ordered: Oxygen Pulsed oxygen portable tank Rate: 4LNC Diagnosis: ILD Duration: Lifetime -99, Disp: 1 Device, Rfl: 1     order for DME, Equipment being ordered: Full face mask for CPAP - size: F10 large, Disp: 1 each, Rfl: 0     order for DME, Oxygen: Patient requires supplemental Oxygen 4 LPM via nasal canula with activity. Please provide patient with a home unit and with portability capability. Oxygen will be for a lifetime., Disp: 1 Device, Rfl: 0     potassium chloride SA (K-DUR/KLOR-CON M) 20 MEQ CR tablet, Take 2 tabs (40 meq) in am and 1 tab (20 meq) in pm daily, Disp: 270 tablet, Rfl: 3     predniSONE (DELTASONE) 1 MG tablet, Use with 5 mg tabs to taper: 10 mg and 9 mg every other day for 1m, 9 mg/day for 1m, 9 mg and 8 mg every other day for 1m, etc. until at 7mg, Disp: 120 tablet, Rfl: 6     predniSONE (DELTASONE) 5 MG tablet, Use with 1 mg tabs to taper: 10 mg and 9 mg every other day for 1m, 9 mg/day for 1m, 9 mg and 8 mg every other day for 1m, etc. until at 7mg, Disp:  30 tablet, Rfl: 6     spironolactone (ALDACTONE) 25 MG tablet, Take 2 tablets (50 mg) by mouth daily, Disp: 180 tablet, Rfl: 3     tiZANidine (ZANAFLEX) 2 MG tablet, Take 1-2 tablets (2-4 mg) by mouth 3 times daily, Disp: 90 tablet, Rfl: 1     torsemide (DEMADEX) 20 MG tablet, Take 40 mg in the morning, 30 mg in the afternoon., Disp: 105 tablet, Rfl: 3     traZODone (DESYREL) 50 MG tablet, Take 0.5-1 tablets (25-50 mg) by mouth nightly as needed for sleep, Disp: 30 tablet, Rfl: 5     warfarin (COUMADIN) 7.5 MG tablet, TAKE 1 TABLET BY MOUTH DAILY. CURRENT DOSE IS 7.5 MG DAILY. DOSE ADJUSTED PER INR RESULT., Disp: 90 tablet, Rfl: 3      Allergies:   Augmentin\  Codeine  Phenobarbital  Seasonal allergies      Past Medical History:  Alcohol abuse, in remission.   Allergic rhinitis.   Antiplatelet long-term use.   Atrial fibrillation.   Cardiomegaly.   Osteopenia.   Diverticulosis.   Benign essential hypertension.   GERD.   Insomnia.   Irregular heart beat.   Lumbago.   Major depressive disorder, recurrent episode, moderate.   ANGELICA.  Osteoarthrosis.   Sjogren's syndrome.   Sleep apnea.   Tobacco use disorder.   TMJ disorder.   RLS.  Major depressive disorder.   Hypertension.   Sciatica.   Colouterine fistula.   Left ventricular hypertrophy.   Breat fibroadenoma.   DVT.   Fatty liver disease, nonalcoholic.   Rib pain.   Neck mass.   Interstitial lung disease.   Acute and chronic respiratory failure with hypoxia.   Type II diabetes mellitus.   Hyperlipidemia.   Inflammatory arthritis.      Past Surgical History:  Back surgery.   Left breast biopsy.   Appendectomy.   Exploratory laparotomy.   Cardiac surgery.   Cholecystectomy.   Left colectomy.   Total abdominal hysterectomy with bilateral salpingo-oophorectomy.   Insert ureter stent.   Flexible sigmoidoscopy.   Ileostomy takedown.     Family History:   Mother: Positive for CAD, heart disease, hypertension, cerebrovascular disease, hyperlipidemia.   Father: Positive for  alcohol/drug abuse, Alzheimer disease, dementia, hypertension, hyperlipidemia.   Sister: Positive for CAD, hypertension, and colorectal cancer.   Sister: Positive for hypertension and hyperlipidemia.   Sister: Positive for diabetes, lung cancer, asthma, and heart disease.   Brother: Positive for Parkinsonism and substance abuse.   Brother: Positive for hypertension, diverticulitis, and prostate cancer.   Daughter: Positive for breast cancer.        Social History:   She is a former smoker; quit in 2011. She has a history of alcohol abuse but stopped drinking in 1986.      Physical Exam:   There were no vitals taken for this visit.   Wt Readings from Last 4 Encounters:   04/04/19 96.6 kg (213 lb)   03/01/19 96.2 kg (212 lb)   02/26/19 96.2 kg (212 lb)   02/14/19 95.7 kg (211 lb)     Constitutional: Well-developed, appearing stated age; cooperative  Eyes: Normal EOM, PERRLA, vision, conjunctiva, sclera  ENT: Normal external ears, nose, hearing, lips, teeth, gums, throat. No mucous membrane lesions, normal saliva pool  Neck: No mass or thyroid enlargement  Resp: Good air movement, + mild scattered wheeze in the upper lobes BL  CV: no murmurs, rubs or gallops, no edema. Irregular rate, normal rhythm.   GI: No ABD mass or tenderness, no HSM  : Not tested  Lymph: No cervical, supraclavicular, inguinal or epitrochlear nodes  MS: The TMJ, neck, shoulder, elbow, wrist, MCP/PIP/DIP, spine, hip, knee, ankle, and foot MTP/IP joints were examined and found normal. No active synovitis or altered joint anatomy. Full joint ROM. Normal  strength. No dactylitis,  tenosynovitis, enthesopathy.  Skin: No nail pitting, alopecia, rash, nodules or lesions  Neuro: grossly non-focal  Psych: Normal judgement, orientation, memory, affect.    Images:  CT Chest w/o contrast (7/25/18):  Findings remain most consistent with small airway disease  and/or nonclassifiable interstitial lung disease and are not  significantly changed from the  March 2017 comparison.    Per radiology.    Laboratory:   RHEUM RESULTS Latest Ref Rng & Units 10/22/2018 2/13/2019 4/9/2019   SED RATE 0 - 30 mm/h - - -   CRP, INFLAMMATION 0.0 - 8.0 mg/L - - -   CK TOTAL 30 - 225 U/L - - -   RHEUMATOID FACTOR <20 IU/mL - - -   RG SCREEN BY EIA <1.0 - - -   AST 0 - 45 U/L - - -   ALT 0 - 50 U/L - - -   ALBUMIN 3.4 - 5.0 g/dL - - -   WBC 4.0 - 11.0 10e9/L - - -   RBC 3.8 - 5.2 10e12/L - - -   HGB 11.7 - 15.7 g/dL - - -   HCT 35.0 - 47.0 % - - -   MCV 78 - 100 fl - - -   MCHC 31.5 - 36.5 g/dL - - -   RDW 10.0 - 15.0 % - - -    - 450 10e9/L - - -   CREATININE 0.52 - 1.04 mg/dL 0.97 0.87 0.85   GFR ESTIMATE, IF BLACK >60 mL/min/[1.73:m2] 67 74 76   GFR ESTIMATE >60 mL/min/[1.73:m2] 55(L) 64 65    - 1,620 mg/dL - - -       Rheumatoid Factor   Date Value Ref Range Status   07/24/2015 <20 <20 IU/mL Final   ,  ,   Cyclic Cit Pept IgG/IgA   Date Value Ref Range Status   07/24/2015 <20  Interpretation:  Negative   <20 UNITS Final   ,  ,   Scleroderma Antibody Scl-70 CECILIA IgG   Date Value Ref Range Status   07/24/2015  0.0 - 0.9 AI Final    <0.2  Negative   Antibody index (AI) values reflect qualitative changes in antibody   concentration that cannot be directly associated with clinical condition or   disease state.       SSA (Ro) (CECILIA) Antibody, IgG   Date Value Ref Range Status   07/24/2015 1.6 (H) 0.0 - 0.9 AI Final     Comment:     Positive   Antibody index (AI) values reflect qualitative changes in antibody   concentration that cannot be directly associated with clinical condition or   disease state.       SSB (La) (CECILIA) Antibody, IgG   Date Value Ref Range Status   07/24/2015  0.0 - 0.9 AI Final    <0.2  Negative   Antibody index (AI) values reflect qualitative changes in antibody   concentration that cannot be directly associated with clinical condition or   disease state.       ,  ,   RG Screen by EIA   Date Value Ref Range Status   07/24/2015 <1.0  Interpretation:   Negative   <1.0 Final   ,  ,  ,  ,  ,  ,  ,  ,  ,  ,  ,  ,  ,  ,  ,  ,  ,  ,   Neutrophil Cytoplasmic IgG Antibody   Date Value Ref Range Status   07/24/2015   Final    <1:20  Reference range: <1:20  (Note)  The ANCA IFA is <1:20; therefore, no further testing will  be performed.  INTERPRETIVE INFORMATION: Anti-Neutrophil Cyto Ab, IgG  Neutrophil Cytoplasmic Antibodies (C-ANCA = granular  cytoplasmic staining, P-ANCA = perinuclear staining) are  found in the serum of over 90 percent of patients with  certain necrotizing systemic vasculitides, and usually in  less than 5 percent of patients with collagen vascular  disease or arthritis.  Performed by [facility],  [address] [place] [id number] [phone]  [email or web address], Burke Mckeon MD, Lab. Director       ,  ,  ,  ,  ,  ,  ,   IGG   Date Value Ref Range Status   07/24/2015 1320 695 - 1620 mg/dL Final   ,  ,  ,  ,  ,   Scleroderma Antibody Scl-70 CECILIA IgG   Date Value Ref Range Status   07/24/2015  0.0 - 0.9 AI Final    <0.2  Negative   Antibody index (AI) values reflect qualitative changes in antibody   concentration that cannot be directly associated with clinical condition or   disease state.         I reviewed and edited the above scribed note to reflect my findings and plan.     I discussed the findings and recommendations with the patient.  Recommendations were discussed with the consulting team.  Time spent: 30 minutes    Carmenza Stringer MD, MS  Rheumatology Attending Physician  [facility] [phone]

## 2019-04-16 ENCOUNTER — OFFICE VISIT (OUTPATIENT)
Dept: FAMILY MEDICINE | Facility: CLINIC | Age: 79
End: 2019-04-16
Payer: MEDICARE

## 2019-04-16 VITALS
DIASTOLIC BLOOD PRESSURE: 78 MMHG | WEIGHT: 212.3 LBS | BODY MASS INDEX: 34.12 KG/M2 | TEMPERATURE: 97.8 F | HEIGHT: 66 IN | SYSTOLIC BLOOD PRESSURE: 118 MMHG | HEART RATE: 55 BPM | OXYGEN SATURATION: 95 %

## 2019-04-16 DIAGNOSIS — I50.30 (HFPEF) HEART FAILURE WITH PRESERVED EJECTION FRACTION (H): ICD-10-CM

## 2019-04-16 DIAGNOSIS — I48.21 PERMANENT ATRIAL FIBRILLATION (H): ICD-10-CM

## 2019-04-16 DIAGNOSIS — E11.65 TYPE 2 DIABETES MELLITUS WITH HYPERGLYCEMIA, WITH LONG-TERM CURRENT USE OF INSULIN (H): Primary | ICD-10-CM

## 2019-04-16 DIAGNOSIS — Z79.4 TYPE 2 DIABETES MELLITUS WITH HYPERGLYCEMIA, WITH LONG-TERM CURRENT USE OF INSULIN (H): Primary | ICD-10-CM

## 2019-04-16 DIAGNOSIS — I10 ESSENTIAL HYPERTENSION WITH GOAL BLOOD PRESSURE LESS THAN 140/90: ICD-10-CM

## 2019-04-16 DIAGNOSIS — M35.02 SJOGREN'S SYNDROME WITH LUNG INVOLVEMENT (H): ICD-10-CM

## 2019-04-16 DIAGNOSIS — M19.90 INFLAMMATORY ARTHRITIS: ICD-10-CM

## 2019-04-16 DIAGNOSIS — F33.1 MAJOR DEPRESSIVE DISORDER, RECURRENT EPISODE, MODERATE (H): ICD-10-CM

## 2019-04-16 PROCEDURE — 99214 OFFICE O/P EST MOD 30 MIN: CPT | Performed by: FAMILY MEDICINE

## 2019-04-16 RX ORDER — ESCITALOPRAM OXALATE 20 MG/1
TABLET ORAL
Qty: 90 TABLET | Refills: 0 | Status: SHIPPED | OUTPATIENT
Start: 2019-04-16 | End: 2019-09-21

## 2019-04-16 ASSESSMENT — MIFFLIN-ST. JEOR: SCORE: 1459.74

## 2019-04-16 ASSESSMENT — PATIENT HEALTH QUESTIONNAIRE - PHQ9
5. POOR APPETITE OR OVEREATING: SEVERAL DAYS
SUM OF ALL RESPONSES TO PHQ QUESTIONS 1-9: 5

## 2019-04-16 ASSESSMENT — ANXIETY QUESTIONNAIRES
2. NOT BEING ABLE TO STOP OR CONTROL WORRYING: NOT AT ALL
3. WORRYING TOO MUCH ABOUT DIFFERENT THINGS: NOT AT ALL
IF YOU CHECKED OFF ANY PROBLEMS ON THIS QUESTIONNAIRE, HOW DIFFICULT HAVE THESE PROBLEMS MADE IT FOR YOU TO DO YOUR WORK, TAKE CARE OF THINGS AT HOME, OR GET ALONG WITH OTHER PEOPLE: NOT DIFFICULT AT ALL
1. FEELING NERVOUS, ANXIOUS, OR ON EDGE: NOT AT ALL
GAD7 TOTAL SCORE: 2
7. FEELING AFRAID AS IF SOMETHING AWFUL MIGHT HAPPEN: NOT AT ALL
6. BECOMING EASILY ANNOYED OR IRRITABLE: SEVERAL DAYS
5. BEING SO RESTLESS THAT IT IS HARD TO SIT STILL: NOT AT ALL

## 2019-04-16 NOTE — NURSING NOTE
"Chief Complaint   Patient presents with     Diabetes     /78   Pulse 55   Temp 97.8  F (36.6  C) (Oral)   Ht 1.676 m (5' 6\")   Wt 96.3 kg (212 lb 4.8 oz)   SpO2 95%   BMI 34.27 kg/m   Estimated body mass index is 34.27 kg/m  as calculated from the following:    Height as of this encounter: 1.676 m (5' 6\").    Weight as of this encounter: 96.3 kg (212 lb 4.8 oz).  Medication Reconciliation: complete      Health Maintenance that is potentially due pending provider review:  PHQ9    Completing PHQ9 today.    CHARLES Mcbride  "

## 2019-04-16 NOTE — PROGRESS NOTES
jamil  SUBJECTIVE:   Betty Tee is a 78 year old female who presents to clinic today for the following health issues:      Diabetes Follow-up - A1C up from 6.7 to 7.2.     On basaglar 25 units in morning  Metformin 1000 mg with dinner.  Novolog mealtime- 12 units with meals, increase if pre-meal glucose 200+  Patient is checking blood sugars: three times daily.   Results are as follows:         am - 120-130         lunchtime - 170s           suppertime - 160s    Diabetic concerns: None     Symptoms of hypoglycemia (low blood sugar): none (couple 90s, feels weak, sweating)     Paresthesias (numbness or burning in feet) or sores: Yes, periodic stabbing pain in both feet, worse in LT foot.  Going on for months.     Date of last diabetic eye exam: 2/13/19    Diet- still trying hard.  She's walking away from the table hungry, which is dangerous, though.  To counter-act that, she is trying to eat more salad.  Will try to add beans/chick-peas to salads.  Already has too many eggs (most breakfasts).    She hasn't seen a diabetic educator or MTM since around the time she was diagnosed a couple yrs ago.  She did see MTM, but at the time she was overwhelmed with specialist visits, and did not want to add more appointments.  She is also wary of counting carbohydrates.      Kidneys- microalb up to 50s, likely due to cardiology stopping lisinopril in 2/19, and possibly with A1C rising (though still in quite good control).      Concerns--  Lately, she feels like she can feel that she's in a.fib- usually can't.  Yesterday, she had a bad one.    Yesterday, while driving.  Today, heart starts pumping hard and fast, can feel it.  ~30 minutes yesterday, ~20 minutes this morning.  She is getting SOB with it.  No known nausea.  New for the past month or a bit more.  About the same frequency and time.  No known triggers.    Caffeine- usually two cups in the morning, pretty stable.  32 yrs sober.  Sleep okay.  Exercise- ended  pulmonary early 2/19  Doing some of those exercises, pursed lip breathing, moving her head, barbell weights, 3-4x/wk.  No sx's with those.    Stairs are hard due to her breathing, but no chest sx's with it.    Cardiology- due to see Dr. Singh.  Last visit in 2/19- switched to xarelto from coumadin at that time.      Inflamm  Prednisone adjusting- 7mg/8mg- sx's started returning.  Now starting 7mg/9mg alternating daily dosing.  Dr. Stringer leaving, will be transitioning to Dr. Webster.    [facility]- saw sleep specialists.    [facility] - tried to get there, but couldn't find their place.  Horrible reviews.  Horrible customer service.  [facility] recommended [facility]- pt will try.        BP Readings from Last 2 Encounters:   04/16/19 118/78   04/11/19 133/79     Hemoglobin A1C (%)   Date Value   04/09/2019 7.2 (H)   10/02/2018 6.7 (H)     LDL Cholesterol Calculated (mg/dL)   Date Value   04/09/2019 29   05/23/2018 21       Diabetes Management Resources    Amount of exercise or physical activity: 2-3 days/week for an average of 15 minutes    Problems taking medications regularly: No    Medication side effects: dizziness     Diet: regular, reduced sugar         Additional history: as documented    Reviewed  and updated as needed this visit by clinical staff  Tobacco  Allergies  Meds  Problems  Med Hx  Surg Hx  Fam Hx         Reviewed and updated as needed this visit by Provider  Tobacco  Allergies  Meds  Problems  Med Hx  Surg Hx  Fam Hx           Patient Active Problem List   Diagnosis     Temporomandibular joint disorder     Diverticulitis of colon     Disorder of bone and cartilage     Osteoarthritis     Alcohol abuse, in remission     Restless legs syndrome (RLS)     Major depressive disorder, recurrent episode, moderate (H)     Essential hypertension with goal blood pressure less than 140/90     Advanced directives, counseling/discussion     Colouterine fistula     Permanent atrial fibrillation (H)     Left  ventricular hypertrophy     Health Care Home     Insomnia     Joint pains     Allergic reaction caused by a drug - likely plaquenil     Breast fibroadenoma     DVT (deep venous thrombosis) (H)     Fatty liver disease, nonalcoholic     Rib pain     Neck mass     Gastroesophageal reflux disease without esophagitis     Enlarged lymph node     Sjogren's syndrome with lung involvement (H)     ANGELICA (obstructive sleep apnea)     ILD (interstitial lung disease) (H)     Lower GI bleed     Acute and chronic respiratory failure with hypoxia (H)     (HFpEF) heart failure with preserved ejection fraction (H)     Type 2 diabetes mellitus with hyperglycemia, with long-term current use of insulin (H)     Heart failure, chronic, with acute decompensation (H)     Hyperlipidemia LDL goal <100     Long term current use of anticoagulant therapy     Inflammatory arthritis     Follicular bronchiolitis (H)     Past Surgical History:   Procedure Laterality Date     BACK SURGERY  1962     BIOPSY BREAST  9/27/02    Biopsy Left Breast     C APPENDECTOMY  1970's?     C NONSPECIFIC PROCEDURE  11/05    exploratory abd lap, adhesions, resolved RLQ pain, diverticulitis episodes     CARDIAC SURGERY       CHOLECYSTECTOMY  1990's?     COLECTOMY LEFT  11/7/2011    Procedure:COLECTOMY LEFT; Laparoscopic mobilization of splenic flexture, sigmoid colectomy, coloprotoscopy, loop illeostomy; Surgeon:CK CASTANEDA; Location:[facility]     HYSTERECTOMY TOTAL ABDOMINAL, BILATERAL SALPINGO-OOPHORECTOMY, COMBINED  11/7/2011    Procedure:COMBINED HYSTERECTOMY TOTAL ABDOMINAL, BILATERAL SALPINGO-OOPHORECTOMY; total abdominal hysterectomy, bilateral salpingo-oophorectomy; Surgeon:ALETA MANUEL; Location:[facility]     INSERT STENT URETER  11/7/2011    Procedure:INSERT STENT URETER; Placement of Bilateral Ureteral Stents ; Surgeon:PRANEETH BRYANT; Location:[facility]     SIGMOIDOSCOPY FLEXIBLE  11/3/2011    Procedure:SIGMOIDOSCOPY FLEXIBLE; Flexible Sigmoidoscopy;  Surgeon:CK CASTANEDA; Location:[facility]     TAKEDOWN ILEOSTOMY  2012    Procedure:TAKEDOWN ILEOSTOMY; Takedown Loop Ileostomy ; Surgeon:CK CASTANEDA; Location:[facility]       Social History     Tobacco Use     Smoking status: Former Smoker     Packs/day: 0.50     Years: 10.00     Pack years: 5.00     Types: Cigarettes     Last attempt to quit: 2011     Years since quittin.8     Smokeless tobacco: Never Used     Tobacco comment:  ppd   Substance Use Topics     Alcohol use: No     Alcohol/week: 0.0 oz     Comment: In recovery beginning            Current Outpatient Medications   Medication Sig Dispense Refill     ACCU-CHEK SHANNON PLUS test strip USE TO TEST BLOOD SUGARS 4 TIMES DAILY OR AS DIRECTED 400 strip 0     acetaminophen (TYLENOL) 500 MG tablet Take 500 mg by mouth nightly as needed        acyclovir (ZOVIRAX) 400 MG tablet Take 1 tablet (400 mg) by mouth 3 times daily For a couple days 15 tablet 2     acyclovir (ZOVIRAX) 5 % external ointment Apply topically 6 times daily As needed for outbreaks 15 g 3     albuterol (PROAIR HFA, PROVENTIL HFA, VENTOLIN HFA) 108 (90 BASE) MCG/ACT inhaler Inhale 2 puffs into the lungs every 6 hours 1 Inhaler 3     azithromycin (ZITHROMAX) 250 MG tablet Take 1 tablet (250 mg) by mouth daily 30 tablet 11     BASAGLAR 100 UNIT/ML injection Inject 25 Units Subcutaneous daily 7.5 mL 2     BD JANE U/F 32G X 4 MM insulin pen needle USE 4 DAILY AS DIRECTED. 400 each 1     blood glucose monitoring (ACCU-CHEK SHANNON PLUS) test strip Use to test blood sugar 4 times daily or as directed.  Ok to substitute alternative if insurance prefers. 120 strip 11     blood glucose monitoring (ACCU-CHEK FASTCLIX) lancets Use to test blood sugar 4 times daily or as directed.  Ok to substitute alternative if insurance prefers. 1 Box 11     blood glucose monitoring (ACCU-CHEK MULTICLIX) lancets Use to test blood sugar 4 times daily or as directed. 4 Box 3     COMPRESSION STOCKINGS Wear  compression stockings in affected leg (right leg) or both legs most of the time during the day and take them off at night. 2 each 2     escitalopram (LEXAPRO) 20 MG tablet TAKE 1 TABLET BY MOUTH EVERY DAY 90 tablet 0     fluticasone (FLONASE) 50 MCG/ACT nasal spray Spray 1-2 sprays into both nostrils daily as needed for allergies 1 Bottle 3     fluticasone (FLOVENT HFA) 220 MCG/ACT inhaler Inhale 2 puffs into the lungs 2 times daily 3 Inhaler 3     Humidifier MISC Continuous humidified air via concentrator.   Diagnosis: ILD  Duration: Lifetime 99. 1 each 1     hydroxychloroquine (PLAQUENIL) 200 MG tablet Take 2 tablets (400 mg) by mouth daily Annual Plaquenil toxicity eye screening required. 180 tablet 1     insulin glargine (BASAGLAR KWIKPEN) 100 UNIT/ML pen INJECT 25 UNITS SUBCUTANEOUS DAILY (TO REPLACE LANTUS) 15 mL 1     insulin pen needle (B-D U/F) 31G X 8 MM Use 4 times daily (with Lantus and Novolog) or as directed. 400 each 3     ketoconazole (NIZORAL) 2 % external cream Apply topically 2 times daily 60 g 1     lidocaine (LIDODERM) 5 % patch Apply patch to painful area for up to 12 h within a 24 h period.  Remove after 12 hours. 30 patch 5     loratadine (CLARITIN) 10 MG tablet Take 1 tablet (10 mg) by mouth daily 90 tablet 1     metFORMIN (GLUCOPHAGE-XR) 500 MG 24 hr tablet TAKE 2 TABLETS BY MOUTH DAILY WITH DINNER 180 tablet 0     metoprolol succinate (TOPROL XL) 25 MG 24 hr tablet Take 0.5 tablets (12.5 mg) by mouth 2 times daily Take 50 mg by mouth in combination with 12.5 for a total of 62.5 twice a day 90 tablet 3     metoprolol succinate (TOPROL-XL) 100 MG 24 hr tablet Take 50 mg by mouth in combination with 12.5 for a total of 62.5 twice a day 90 tablet 3     order for DME Please provide patient with a POC.  Okay to test patient on POC to maintain oxygen saturations above 90%.   Patient currently uses 2 to 3 LPM oxygen with activity. 1 Device 0     order for DME Equipment being ordered:  Oxygen  Pulsed oxygen portable tank  Rate: 4LNC  Diagnosis: ILD  Duration: Lifetime -99 1 Device 1     order for DME Equipment being ordered: Full face mask for CPAP - size: F10 large 1 each 0     order for DME Oxygen: Patient requires supplemental Oxygen 4 LPM via nasal canula with activity. Please provide patient with a home unit and with portability capability. Oxygen will be for a lifetime. 1 Device 0     potassium chloride SA (K-DUR/KLOR-CON M) 20 MEQ CR tablet Take 2 tabs (40 meq) in am and 1 tab (20 meq) in pm daily 270 tablet 3     predniSONE (DELTASONE) 1 MG tablet Take 9 mg and 7 mg on alternating days for 1 month. Then try decreasing to 8 mg and 6 mg on alternating days. 180 tablet 3     predniSONE (DELTASONE) 1 MG tablet Use with 5 mg tabs for a total of 7 mg per day. 120 tablet 1     predniSONE (DELTASONE) 5 MG tablet Take 7 mg a day, use with 1 mg tablets. (Patient taking differently: 8 mg Take 7 mg a day, use with 1 mg tablets.) 60 tablet 3     rivaroxaban ANTICOAGULANT (XARELTO) 20 MG TABS tablet Take 1 tablet (20 mg) by mouth daily (with dinner) 90 tablet 3     spironolactone (ALDACTONE) 25 MG tablet Take 2 tablets (50 mg) by mouth daily 180 tablet 3     tiZANidine (ZANAFLEX) 2 MG tablet Take 1-2 tablets (2-4 mg) by mouth 3 times daily 90 tablet 1     torsemide (DEMADEX) 10 MG tablet Take one tab (10 mg) with one 20 mg tab to = 30 mg daily in afternoon. 90 tablet 3     torsemide (DEMADEX) 20 MG tablet Take 2 tabs (40 mg) in am daily 105 tablet 10     traZODone (DESYREL) 50 MG tablet Take 0.5-1 tablets (25-50 mg) by mouth nightly as needed for sleep 30 tablet 5     atorvastatin (LIPITOR) 10 MG tablet TAKE 1 TABLET BY MOUTH EVERY DAY 90 tablet 0     montelukast (SINGULAIR) 10 MG tablet TAKE 1 TABLET BY MOUTH EVERYDAY AT BEDTIME 90 tablet 0     NOVOLOG FLEXPEN 100 UNIT/ML soln INJECT 12 UNITS SUBCUTANEOUS 3 TIMES DAILY (WITH MEALS) 15 mL 2     NOVOLOG FLEXPEN 100 UNIT/ML soln INJECT 12 UNITS  "SUBCUTANEOUS 3 TIMES DAILY (WITH MEALS) 15 mL 1     Allergies   Allergen Reactions     Amoxicillin-Pot Clavulanate      Augmentin Nausea and Vomiting     Codeine Nausea and Vomiting     Codeine      Phenobarbital Itching     Phenobarbital      Seasonal Allergies      Recent Labs   Lab Test 04/09/19  1017 02/13/19  1523  10/02/18  1524 05/23/18  1028  02/21/18  1230  10/06/17  1421  08/01/17  1146   A1C 7.2*  --   --  6.7* 6.9*  --   --    < > 7.3*  --   --    LDL 29  --   --   --  21  --  5  --   --   --   --    HDL 59  --   --   --  53  --  64  --   --   --   --    TRIG 148  --   --   --  113  --  188*  --   --   --   --    ALT  --   --   --   --  21  --   --   --  43  --  45   CR 0.85 0.87   < >  --  0.90   < > 0.88   < > 0.94   < > 0.90   GFRESTIMATED 65 64   < >  --  61   < > 62   < > 58*   < > 61   GFRESTBLACK 76 74   < >  --  74   < > 76   < > 70   < > 74   POTASSIUM 3.9 4.3   < >  --  3.5   < > 3.9   < > 4.3   < > 3.5   TSH 2.72  --   --   --  2.42  --   --    < >  --   --   --     < > = values in this interval not displayed.      BP Readings from Last 3 Encounters:   04/16/19 118/78   04/11/19 133/79   04/04/19 125/70    Wt Readings from Last 3 Encounters:   04/16/19 96.3 kg (212 lb 4.8 oz)   04/04/19 96.6 kg (213 lb)   03/01/19 96.2 kg (212 lb)              ROS:  Constitutional, HEENT, cardiovascular, pulmonary, GI, , musculoskeletal, neuro, skin, endocrine and psych systems are negative, except as otherwise noted.    OBJECTIVE:     /78   Pulse 55   Temp 97.8  F (36.6  C) (Oral)   Ht 1.676 m (5' 6\")   Wt 96.3 kg (212 lb 4.8 oz)   SpO2 95%   BMI 34.27 kg/m    Body mass index is 34.27 kg/m .  GENERAL APPEARANCE: healthy, alert and no distress     EYES: PERRL, sclera clear     HENT: nose and mouth without ulcers or lesions     NECK: no adenopathy, no asymmetry, masses, or scars and thyroid normal to palpation     RESP: lungs clear to auscultation - no rales, rhonchi or wheezes     CV: regular " rates and rhythm, normal S1 S2, no S3 or S4 and no murmur, click or rub      Abdomen: soft, nontender, no HSM or masses and bowel sounds normal     Ext: warm, dry, no edema      Psych: full range affect, normal speech and grooming, judgement and insight intact     Diagnostic Test Results:  Results for orders placed or performed in visit on 04/09/19   **Basic metabolic panel FUTURE anytime   Result Value Ref Range    Sodium 139 133 - 144 mmol/L    Potassium 3.9 3.4 - 5.3 mmol/L    Chloride 106 94 - 109 mmol/L    Carbon Dioxide 23 20 - 32 mmol/L    Anion Gap 10 3 - 14 mmol/L    Glucose 156 (H) 70 - 99 mg/dL    Urea Nitrogen 22 7 - 30 mg/dL    Creatinine 0.85 0.52 - 1.04 mg/dL    GFR Estimate 65 >60 mL/min/[1.73_m2]    GFR Estimate If Black 76 >60 mL/min/[1.73_m2]    Calcium 8.8 8.5 - 10.1 mg/dL   Albumin Random Urine Quantitative with Creat Ratio   Result Value Ref Range    Creatinine Urine 56 mg/dL    Albumin Urine mg/L 28 mg/L    Albumin Urine mg/g Cr 50.98 (H) 0 - 25 mg/g Cr   **TSH with free T4 reflex FUTURE anytime   Result Value Ref Range    TSH 2.72 0.40 - 4.00 mU/L   **A1C FUTURE anytime   Result Value Ref Range    Hemoglobin A1C 7.2 (H) 0 - 5.6 %   Lipid panel reflex to direct LDL Fasting   Result Value Ref Range    Cholesterol 118 <200 mg/dL    Triglycerides 148 <150 mg/dL    HDL Cholesterol 59 >49 mg/dL    LDL Cholesterol Calculated 29 <100 mg/dL    Non HDL Cholesterol 59 <130 mg/dL     *Note: Due to a large number of results and/or encounters for the requested time period, some results have not been displayed. A complete set of results can be found in Results Review.       ASSESSMENT/PLAN:       ICD-10-CM    1. Type 2 diabetes mellitus with hyperglycemia, with long-term current use of insulin (H) E11.65     Z79.4    2. Permanent atrial fibrillation (H) I48.2    3. Major depressive disorder, recurrent episode, moderate F33.1 escitalopram (LEXAPRO) 20 MG tablet   4. (HFpEF) heart failure with preserved  ejection fraction (H) I50.30    5. Essential hypertension with goal blood pressure less than 140/90 I10    6. Inflammatory arthritis M19.90    7. Sjogren's syndrome with lung involvement (H) M35.02      DM II- still in good control, though A1C increased from 6.7 to 7.2.  She has felt more hungry leaving the table lately, which makes her want to eat crackers, but doesn't think she's eating much differently.  She continues on metformin, basaglar 25 units in the morning, and novolog w/ meals (12 units base, up if pre meal-time glucoses 200+).  Will continue current meds, but encouraged her to meet with MTM again for DM counseling and adjustments if needed.  Pt is open to meeting with Sabrina again- had needed to stop previously due to be overwhelmed with specialist visits.    Atrial fibrillation- tPt is feeling a.fib/palpitations as of the last month or so.  Previously she did not notice when she was in/out of sinus rhythm, but now she is having episodes of heart pounding/racing for 10-30 minutes.  No other concerning sx's.  Recommended she call Dr. Rosa's office to see if they would like to see her earlier than planned, or if they had other recommendations.  She is currently on toprol for rate control, but BP and pulse are already on the lower side despite stopping lisinopril in 2/19.    HTN- as above.  Also reviewed normal BMP, but elevated microalbumin at 50 with recent labs, suspect this is due to stopping the lisinopril in 2/19 through cardiology due to her lower BP.  For now, being off the lisinopril is likely the best option, but will want to try adding it back if/when able.    Inflammatory arthritis/Sjogrens- pt having increased sx's are they are lowering her prednisone dosing very slowly, so her dose was slightly increased just a few days ago.  Cont f/u with rheumatology.    Return in about 3 months (around 7/16/2019). with Dr. Tristan, but she will try and schedule with MTM sooner.    Ilene Mahan  MD Kun  [facility]

## 2019-04-17 DIAGNOSIS — M35.02 SJOGREN'S SYNDROME WITH LUNG INVOLVEMENT (H): ICD-10-CM

## 2019-04-17 DIAGNOSIS — J84.9 ILD (INTERSTITIAL LUNG DISEASE) (H): ICD-10-CM

## 2019-04-17 LAB
CORTIS SERPL-MCNC: 5.9 UG/DL (ref 4–22)
CRP SERPL-MCNC: 18.5 MG/L (ref 0–8)

## 2019-04-17 PROCEDURE — 82533 TOTAL CORTISOL: CPT | Performed by: INTERNAL MEDICINE

## 2019-04-17 PROCEDURE — 36415 COLL VENOUS BLD VENIPUNCTURE: CPT | Performed by: INTERNAL MEDICINE

## 2019-04-17 PROCEDURE — 86140 C-REACTIVE PROTEIN: CPT | Performed by: INTERNAL MEDICINE

## 2019-04-17 RX ORDER — MONTELUKAST SODIUM 10 MG/1
TABLET ORAL
Qty: 90 TABLET | Refills: 0 | Status: SHIPPED | OUTPATIENT
Start: 2019-04-17 | End: 2019-07-22

## 2019-04-17 ASSESSMENT — ANXIETY QUESTIONNAIRES: GAD7 TOTAL SCORE: 2

## 2019-04-17 NOTE — TELEPHONE ENCOUNTER
"Prescription approved per [facility] Refill Protocol.  Lydia HALEY RN    Last Written Prescription Date:  1/23/2019  Last Fill Quantity: 90,  # refills: 0   Last office visit: No previous visit found with prescribing provider:     Future Office Visit:   Next 5 appointments (look out 90 days)    Jun 18, 2019  2:00 PM CDT  Office Visit with Sabrina Meek RPH  [facility] ([facility]) [address]  [address]  [facility] [address]  [phone]   Jul 10, 2019 12:45 PM CDT  Office Visit with Ilene Tristan MD  [facility] ([facility]) [address] [place]  [facility] [address]  [phone]         Requested Prescriptions   Pending Prescriptions Disp Refills     montelukast (SINGULAIR) 10 MG tablet [Pharmacy Med Name: MONTELUKAST SOD 10 MG TABLET] 90 tablet 0     Sig: TAKE 1 TABLET BY MOUTH EVERYDAY AT BEDTIME       Leukotriene Inhibitors Protocol Passed - 4/17/2019 10:38 AM        Passed - Patient is age 12 or older     If patient is under 16, ok to refill using age based dosing.           Passed - Recent (12 mo) or future (30 days) visit within the authorizing provider's specialty     Patient had office visit in the last 12 months or has a visit in the next 30 days with authorizing provider or within the authorizing provider's specialty.  See \"Patient Info\" tab in inbasket, or \"Choose Columns\" in Meds & Orders section of the refill encounter.              Passed - Medication is active on med list          "

## 2019-04-27 DIAGNOSIS — E11.69 TYPE 2 DIABETES MELLITUS WITH OTHER SPECIFIED COMPLICATION (H): ICD-10-CM

## 2019-04-29 RX ORDER — INSULIN ASPART 100 [IU]/ML
INJECTION, SOLUTION INTRAVENOUS; SUBCUTANEOUS
Qty: 15 ML | Refills: 2 | Status: SHIPPED | OUTPATIENT
Start: 2019-04-29 | End: 2019-08-01

## 2019-04-29 NOTE — TELEPHONE ENCOUNTER
Prescription approved per [facility] Refill Protocol.  Lydia HALEY RN    Last Written Prescription Date:  2/12/2019  Last Fill Quantity: 15,  # refills: 1   Last office visit: No previous visit found with prescribing provider:     Future Office Visit:   Next 5 appointments (look out 90 days)    Jun 18, 2019  2:00 PM CDT  Office Visit with Sabrina Meek RPH  [facility] ([facility]) [address]  [address]  [facility] [address]  [phone]   Jul 10, 2019 12:45 PM CDT  Office Visit with Ilene Tristan MD  [facility] ([facility]) [address] [place]  [facility] [address]  [phone]         Requested Prescriptions   Pending Prescriptions Disp Refills     NOVOLOG FLEXPEN 100 UNIT/ML soln [Pharmacy Med Name: NOVOLOG 100 UNIT/ML FLEXPEN]  1     Sig: INJECT 12 UNITS SUBCUTANEOUS 3 TIMES DAILY (WITH MEALS)       Short Acting Insulin Protocol Passed - 4/27/2019  8:53 AM        Passed - Blood pressure less than 140/90 in past 6 months     BP Readings from Last 3 Encounters:   04/16/19 118/78   04/11/19 133/79   04/04/19 125/70                 Passed - LDL on file in past 12 months     Recent Labs   Lab Test 04/09/19  1017   LDL 29             Passed - Microalbumin on file in past 12 months     Recent Labs   Lab Test 04/09/19  1017   MICROL 28   UMALCR 50.98*             Passed - Serum creatinine on file in past 12 months     Recent Labs   Lab Test 04/09/19  1017  04/02/17  2213   CR 0.85   < >  --    CREAT  --   --  0.6    < > = values in this interval not displayed.             Passed - HgbA1C in past 3 or 6 months     If HgbA1C is 8 or greater, it needs to be on file within the past 3 months.  If less than 8, must be on file within the past 6 months.     Recent Labs   Lab Test 04/09/19  1017   A1C 7.2*             Passed - Medication is active on med list        Passed - Patient is age 18 or older        Passed - Recent (6 mo) or  "future (30 days) visit within the authorizing provider's specialty     Patient had office visit in the last 6 months or has a visit in the next 30 days with authorizing provider or within the authorizing provider's specialty.  See \"Patient Info\" tab in inbasket, or \"Choose Columns\" in Meds & Orders section of the refill encounter.              "

## 2019-05-01 DIAGNOSIS — E11.69 TYPE 2 DIABETES MELLITUS WITH OTHER SPECIFIED COMPLICATION (H): ICD-10-CM

## 2019-05-02 RX ORDER — INSULIN ASPART 100 [IU]/ML
INJECTION, SOLUTION INTRAVENOUS; SUBCUTANEOUS
Start: 2019-05-02

## 2019-05-02 NOTE — TELEPHONE ENCOUNTER
"Duplicate.  Rx sent 4/29/19.  Sophia Hemphill RN    Requested Prescriptions   Refused Prescriptions Disp Refills     NOVOLOG FLEXPEN 100 UNIT/ML soln [Pharmacy Med Name: NOVOLOG 100 UNIT/ML FLEXPEN]       Sig: INJECT 12 UNITS SUBCUTANEOUS 3 TIMES DAILY (WITH MEALS)       Short Acting Insulin Protocol Passed - 5/1/2019 11:26 AM        Passed - Blood pressure less than 140/90 in past 6 months     BP Readings from Last 3 Encounters:   04/16/19 118/78   04/11/19 133/79   04/04/19 125/70                 Passed - LDL on file in past 12 months     Recent Labs   Lab Test 04/09/19  1017   LDL 29             Passed - Microalbumin on file in past 12 months     Recent Labs   Lab Test 04/09/19  1017   MICROL 28   UMALCR 50.98*             Passed - Serum creatinine on file in past 12 months     Recent Labs   Lab Test 04/09/19  1017  04/02/17  2213   CR 0.85   < >  --    CREAT  --   --  0.6    < > = values in this interval not displayed.             Passed - HgbA1C in past 3 or 6 months     If HgbA1C is 8 or greater, it needs to be on file within the past 3 months.  If less than 8, must be on file within the past 6 months.     Recent Labs   Lab Test 04/09/19  1017   A1C 7.2*             Passed - Medication is active on med list        Passed - Patient is age 18 or older        Passed - Recent (6 mo) or future (30 days) visit within the authorizing provider's specialty     Patient had office visit in the last 6 months or has a visit in the next 30 days with authorizing provider or within the authorizing provider's specialty.  See \"Patient Info\" tab in inbasket, or \"Choose Columns\" in Meds & Orders section of the refill encounter.              "

## 2019-05-07 ENCOUNTER — TELEPHONE (OUTPATIENT)
Dept: FAMILY MEDICINE | Facility: CLINIC | Age: 79
End: 2019-05-07

## 2019-05-07 DIAGNOSIS — E78.5 HYPERLIPIDEMIA LDL GOAL <100: ICD-10-CM

## 2019-05-07 NOTE — TELEPHONE ENCOUNTER
Received form(s) from [employer] for supplies cpap.  Placed form(s) in/on CW's box.  Forms need to be signed and faxed to [phone]..    Call pt to verify form was sent: No  Copy needs to be sent for scanning after completion: Yes    Thank you  lilia harris

## 2019-05-08 RX ORDER — ATORVASTATIN CALCIUM 10 MG/1
TABLET, FILM COATED ORAL
Qty: 90 TABLET | Refills: 0 | Status: SHIPPED | OUTPATIENT
Start: 2019-05-08 | End: 2019-08-26

## 2019-05-08 NOTE — TELEPHONE ENCOUNTER
"Lipitor   Last Written Prescription Date:  02/04/2019  Last Fill Quantity: 90,  # refills: 0   Last office visit: No previous visit found with prescribing provider:  04/16/2019 last office visit with pcp    Future Office Visit:   Next 5 appointments (look out 90 days)    Jun 18, 2019  2:00 PM CDT  Office Visit with Sabrina Meek RPH  [facility] ([facility]) [address]  [address]  [facility] [address]  [phone]   Jul 10, 2019 12:45 PM CDT  Office Visit with Ilene Tristan MD  [facility] ([facility]) [address]  [facility] [address]  [phone]         Requested Prescriptions   Pending Prescriptions Disp Refills     atorvastatin (LIPITOR) 10 MG tablet [Pharmacy Med Name: ATORVASTATIN 10 MG TABLET] 90 tablet 0     Sig: TAKE 1 TABLET BY MOUTH EVERY DAY       Statins Protocol Passed - 5/7/2019  2:15 PM        Passed - LDL on file in past 12 months     Recent Labs   Lab Test 04/09/19  1017   LDL 29             Passed - No abnormal creatine kinase in past 12 months     Recent Labs   Lab Test 07/24/15  1236   CKT 57                Passed - Recent (12 mo) or future (30 days) visit within the authorizing provider's specialty     Patient had office visit in the last 12 months or has a visit in the next 30 days with authorizing provider or within the authorizing provider's specialty.  See \"Patient Info\" tab in inbasket, or \"Choose Columns\" in Meds & Orders section of the refill encounter.              Passed - Medication is active on med list        Passed - Patient is age 18 or older        Passed - No active pregnancy on record        Passed - No positive pregnancy test in past 12 months          "

## 2019-05-20 DIAGNOSIS — E78.5 HYPERLIPIDEMIA LDL GOAL <100: ICD-10-CM

## 2019-05-21 RX ORDER — ATORVASTATIN CALCIUM 10 MG/1
TABLET, FILM COATED ORAL
Start: 2019-05-21

## 2019-05-21 NOTE — TELEPHONE ENCOUNTER
"Lipitor   Last Written Prescription Date:  05/08/2019  Last Fill Quantity: 90,  # refills: 0   Last office visit: 4/16/2019 with prescribing provider:  Yes    Future Office Visit:   Next 5 appointments (look out 90 days)    Jun 18, 2019  2:00 PM CDT  Office Visit with Sabrina Meek RPH  [facility] ([facility]) [address]  [address]  [facility] [address]  [phone]   Jul 10, 2019 12:45 PM CDT  Office Visit with Ilene Tristan MD  [facility] ([facility]) [address]  [facility] [address] [phone]         Requested Prescriptions   Pending Prescriptions Disp Refills     atorvastatin (LIPITOR) 10 MG tablet [Pharmacy Med Name: ATORVASTATIN 10 MG TABLET] 90 tablet 0     Sig: TAKE 1 TABLET BY MOUTH EVERY DAY       Statins Protocol Passed - 5/20/2019  5:47 PM        Passed - LDL on file in past 12 months     Recent Labs   Lab Test 04/09/19  1017   LDL 29             Passed - No abnormal creatine kinase in past 12 months     Recent Labs   Lab Test 07/24/15  1236   CKT 57                Passed - Recent (12 mo) or future (30 days) visit within the authorizing provider's specialty     Patient had office visit in the last 12 months or has a visit in the next 30 days with authorizing provider or within the authorizing provider's specialty.  See \"Patient Info\" tab in inbasket, or \"Choose Columns\" in Meds & Orders section of the refill encounter.              Passed - Medication is active on med list        Passed - Patient is age 18 or older        Passed - No active pregnancy on record        Passed - No positive pregnancy test in past 12 months          "

## 2019-06-09 DIAGNOSIS — Z79.4 TYPE 2 DIABETES MELLITUS WITH HYPERGLYCEMIA, WITH LONG-TERM CURRENT USE OF INSULIN (H): ICD-10-CM

## 2019-06-09 DIAGNOSIS — E11.65 TYPE 2 DIABETES MELLITUS WITH HYPERGLYCEMIA, WITH LONG-TERM CURRENT USE OF INSULIN (H): ICD-10-CM

## 2019-06-10 DIAGNOSIS — E11.9 TYPE 2 DIABETES MELLITUS WITHOUT COMPLICATION, WITHOUT LONG-TERM CURRENT USE OF INSULIN (H): ICD-10-CM

## 2019-06-10 RX ORDER — BLOOD SUGAR DIAGNOSTIC
STRIP MISCELLANEOUS
Qty: 400 STRIP | Refills: 0 | Status: SHIPPED | OUTPATIENT
Start: 2019-06-10 | End: 2019-07-10

## 2019-06-10 RX ORDER — PEN NEEDLE, DIABETIC 32GX 5/32"
NEEDLE, DISPOSABLE MISCELLANEOUS
Qty: 400 EACH | Refills: 1 | Status: SHIPPED | OUTPATIENT
Start: 2019-06-10 | End: 2020-01-06

## 2019-06-10 RX ORDER — INSULIN GLARGINE 100 [IU]/ML
25 INJECTION, SOLUTION SUBCUTANEOUS DAILY
Qty: 15 ML | Refills: 1 | Status: SHIPPED | OUTPATIENT
Start: 2019-06-10 | End: 2019-09-03

## 2019-06-10 NOTE — TELEPHONE ENCOUNTER
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)       Long Acting Insulin Protocol Passed - 6/9/2019 10:01 AM        Passed - Blood pressure less than 140/90 in past 6 months     BP Readings from Last 3 Encounters:   04/16/19 118/78   04/11/19 133/79   04/04/19 125/70                 Passed - LDL on file in past 12 months     Recent Labs   Lab Test 04/09/19  1017   LDL 29             Passed - Microalbumin on file in past 12 months     Recent Labs   Lab Test 04/09/19  1017   MICROL 28   UMALCR 50.98*             Passed - Serum creatinine on file in past 12 months     Recent Labs   Lab Test 04/09/19  1017  04/02/17  2213   CR 0.85   < >  --    CREAT  --   --  0.6    < > = values in this interval not displayed.             Passed - HgbA1C in past 3 or 6 months     If HgbA1C is 8 or greater, it needs to be on file within the past 3 months.  If less than 8, must be on file within the past 6 months.     Recent Labs   Lab Test 04/09/19  1017   A1C 7.2*             Passed - Medication is active on med list        Passed - Patient is age 18 or older        Passed - Recent (6 mo) or future (30 days) visit within the authorizing provider's specialty     Patient had office visit in the last 6 months or has a visit in the next 30 days with authorizing provider or within the authorizing provider's specialty.  See \"Patient Info\" tab in inbasket, or \"Choose Columns\" in Meds & Orders section of the refill encounter.         Last Written Prescription Date:  2/12/2019  Last Fill Quantity: 15mls,  # refills: 1   Last office visit: 4/16/2019 with prescribing provider:  Dr. Tristan  Future Office Visit:   Next 5 appointments (look out 90 days)    Jun 18, 2019  2:00 PM CDT  Office Visit with Sabrina Meek [facility] ([facility]) [address]  [address]  [facility] [address]  [phone]   Jul 10, 2019 12:45 PM CDT  Office Visit with Ilene Tristan MD  [facility] ([facility]) [address] [place]  [facility] [address]  [phone]         Jacki Bai MA  Medical Assistant  [facility]

## 2019-06-10 NOTE — TELEPHONE ENCOUNTER
"Requested Prescriptions   Pending Prescriptions Disp Refills     ACCU-CHEK SHANNON PLUS test strip [Pharmacy Med Name: ACCU-CHEK SHANNON PLUS TEST STRP] 400 strip 0     Sig: USE TO TEST BLOOD SUGARS 4 TIMES DAILY OR AS DIRECTED       Diabetic Supplies Protocol Passed - 6/10/2019 11:03 AM        Passed - Medication is active on med list        Passed - Patient is 18 years of age or older        Passed - Recent (6 mo) or future (30 days) visit within the authorizing provider's specialty     Patient had office visit in the last 6 months or has a visit in the next 30 days with authorizing provider.  See \"Patient Info\" tab in inbasket, or \"Choose Columns\" in Meds & Orders section of the refill encounter.            BD JANE U/F 32G X 4 MM insulin pen needle [Pharmacy Med Name: BD UF JANE PEN NEEDLE [id number]]  1     Sig: USE 4 DAILY AS DIRECTED.       Diabetic Supplies Protocol Passed - 6/10/2019 11:03 AM        Passed - Medication is active on med list        Passed - Patient is 18 years of age or older        Passed - Recent (6 mo) or future (30 days) visit within the authorizing provider's specialty     Patient had office visit in the last 6 months or has a visit in the next 30 days with authorizing provider.  See \"Patient Info\" tab in inbasket, or \"Choose Columns\" in Meds & Orders section of the refill encounter.         Last Written Prescription Date:  2/18/2019  Last Fill Quantity: 400STRIPS,  # refills: 0   Last office visit: 4/16/2019 with prescribing provider:  Dr. Tristan  Future Office Visit:   Next 5 appointments (look out 90 days)    Jun 18, 2019  2:00 PM CDT  Office Visit with Sabrina Meek RPH  [facility] ([facility]) [address]  [address]  [facility] [id number] [phone]   Jul 10, 2019 12:45 PM CDT  Office Visit with Ilene Tristan MD  [facility] ([facility]) [address]  [facility] [id number] [phone] "         Jacki Bai MA  Medical Assistant  [facility]

## 2019-06-12 DIAGNOSIS — E11.9 TYPE 2 DIABETES MELLITUS WITHOUT COMPLICATION, WITHOUT LONG-TERM CURRENT USE OF INSULIN (H): ICD-10-CM

## 2019-06-12 RX ORDER — METFORMIN HCL 500 MG
TABLET, EXTENDED RELEASE 24 HR ORAL
Qty: 180 TABLET | Refills: 0 | Status: SHIPPED | OUTPATIENT
Start: 2019-06-12 | End: 2019-09-09

## 2019-06-12 NOTE — TELEPHONE ENCOUNTER
Last Written Prescription Date:  3/18/2019  Last Fill Quantity: 180,  # refills: 0   Last office visit: 4/16/2019 with prescribing provider:     Future Office Visit:   Next 5 appointments (look out 90 days)    Jun 18, 2019  2:00 PM CDT  Office Visit with Sabrina Meek RPH  [facility] ([facility]) [address]  [address]  [facility] [address]  [phone]   Jul 10, 2019 12:45 PM CDT  Office Visit with Ilene Tristan MD  [facility] ([facility]) [address]  [facility] [address]  [phone]       Requested Prescriptions   Pending Prescriptions Disp Refills     metFORMIN (GLUCOPHAGE-XR) 500 MG 24 hr tablet [Pharmacy Med Name: METFORMIN HCL  MG TABLET] 180 tablet 0     Sig: TAKE 2 TABLETS BY MOUTH DAILY WITH DINNER       Biguanide Agents Passed - 6/12/2019 10:35 AM        Passed - Blood pressure less than 140/90 in past 6 months     BP Readings from Last 3 Encounters:   04/16/19 118/78   04/11/19 133/79   04/04/19 125/70                 Passed - Patient has documented LDL within the past 12 mos.     Recent Labs   Lab Test 04/09/19  1017   LDL 29             Passed - Patient has had a Microalbumin in the past 15 mos.     Recent Labs   Lab Test 04/09/19  1017   MICROL 28   UMALCR 50.98*             Passed - Patient is age 10 or older        Passed - Patient has documented A1c within the specified period of time.     If HgbA1C is 8 or greater, it needs to be on file within the past 3 months.  If less than 8, must be on file within the past 6 months.     Recent Labs   Lab Test 04/09/19  1017   A1C 7.2*             Passed - Patient's CR is NOT>1.4 OR Patient's EGFR is NOT<45 within past 12 mos.     Recent Labs   Lab Test 04/09/19  1017   GFRESTIMATED 65   GFRESTBLACK 76       Recent Labs   Lab Test 04/09/19  1017   CR 0.85             Passed - Patient does NOT have a diagnosis of CHF.        Passed - Medication is  "active on med list        Passed - Patient is not pregnant        Passed - Patient has not had a positive pregnancy test within the past 12 mos.         Passed - Recent (6 mo) or future (30 days) visit within the authorizing provider's specialty     Patient had office visit in the last 6 months or has a visit in the next 30 days with authorizing provider or within the authorizing provider's specialty.  See \"Patient Info\" tab in inbasket, or \"Choose Columns\" in Meds & Orders section of the refill encounter.            "

## 2019-06-12 NOTE — TELEPHONE ENCOUNTER
Prescription approved per [facility] Refill Protocol.  Sophia Hemphill RN

## 2019-06-18 ENCOUNTER — OFFICE VISIT (OUTPATIENT)
Dept: PHARMACY | Facility: CLINIC | Age: 79
End: 2019-06-18
Payer: COMMERCIAL

## 2019-06-18 VITALS — WEIGHT: 213.3 LBS | BODY MASS INDEX: 34.43 KG/M2 | SYSTOLIC BLOOD PRESSURE: 108 MMHG | DIASTOLIC BLOOD PRESSURE: 72 MMHG

## 2019-06-18 DIAGNOSIS — M13.0 POLYARTHRITIS: ICD-10-CM

## 2019-06-18 DIAGNOSIS — E11.9 TYPE 2 DIABETES MELLITUS WITHOUT COMPLICATION, WITHOUT LONG-TERM CURRENT USE OF INSULIN (H): Primary | ICD-10-CM

## 2019-06-18 DIAGNOSIS — M35.02 SJOGREN'S SYNDROME WITH LUNG INVOLVEMENT (H): ICD-10-CM

## 2019-06-18 PROCEDURE — 99605 MTMS BY PHARM NP 15 MIN: CPT | Performed by: PHARMACIST

## 2019-06-18 PROCEDURE — 99607 MTMS BY PHARM ADDL 15 MIN: CPT | Performed by: PHARMACIST

## 2019-06-18 RX ORDER — PREDNISONE 5 MG/1
TABLET ORAL
Qty: 90 TABLET | Refills: 0 | COMMUNITY
Start: 2019-06-18 | End: 2019-08-16

## 2019-06-18 NOTE — PATIENT INSTRUCTIONS
Recommendations from today's MTM visit:                                                    MTM (medication therapy management) is a service provided by a clinical pharmacist designed to help you get the most of out of your medicines.   Today we reviewed what your medicines are for, how to know if they are working, that your medicines are safe and how to make your medicine regimen as easy as possible.     1. Ask  at your appointment in August about changing Plaquenil (hydroxychloroquine). This takes about 2-3 months to work, so if you haven't noticed any improvement by July, you may need to switch medications.     2. Start Jardiance 10mg once a day in the morning - with or without food. Make no other changes to your diabetes medication.     3. Think about if you would like to start using a glucose sensor (Freestyle Sam or Dexcom G6).     Next MTM visit: Labs on 6/26 at 11:30. Sabrina will call you next week to see how you are doing.     To schedule another MTM appointment, please call the clinic directly or you may call the MTM scheduling line at [phone] or toll-free at [phone].     My Clinical Pharmacist's contact information:                                                      It was a pleasure talking with you today!  Please feel free to contact me with any questions or concerns you have.      Sabrina Meek, Pharm.D., M.B.A., [facility]  MTM Pharmacist, [facility]  Pager: [phone]  Email: [email or web address]     You may receive a survey about the MTM services you received by email and/or US Mail.  I would appreciate your feedback to help me serve you better in the future. Your comments will be anonymous.

## 2019-06-18 NOTE — PROGRESS NOTES
SUBJECTIVE/OBJECTIVE:                Betty Tee is a 79 year old female coming in for a initial visit for Medication Therapy Management.  She was referred to me from Dr. Tristan. She is joined by her friend, Yvette, today. Yvette attends almost all of Betty's appointments and fills Betty's pill boxes.     Chief Complaint: Diabetes treatment, always feeling hungry and having trouble with weight gain.     Tobacco: No tobacco use  Alcohol: not currently using    Medication Adherence/Access: no issues reported    Diabetes:  Pt currently taking Basaglar 25 units, metformin ER 1000mg with dinner, Novolog 12 units TID with meals. Pt is not experiencing side effects.  SMBG: 3-4 times daily.   Ranges (patient reported): 130-140s in the AM, noon, 160-170's before dinner. Very rarely less than 120. Does not recall ever having a low reading.   Patient is not experiencing hypoglycemia  Recent symptoms of high blood sugar? none  Aspirin: Not taking due to Xarelto treatment  Diet/Exercise:   Breakfast: scrambled egg and toast, cherries, blueberries  Lunch: bread, turkey and jacob  Dinner: pork chop, squash, beans  Doesn't really snack - If she does, peanuts.   Occasional dessert, but rare.     Sjogren's and polyarthritis: She is on prednisone (alternating between 6 and 8mg once a day) and hydroxychloroquine 200mg - 2 tablets once daily (just started in April). Was seeing Dr. Stringer for this but will be switching to Dr. Lopez (Dr. Stringer is leaving). Dr. Stringer's plan was to work on reducing predinsone dose due to consequences of long-term use, but joint flares occur each time Betty decreases the dose. Since starting hydroxychloroquine, Betty has not noticed any difference in her pain symptoms.      Today's Vitals: /72   Wt 213 lb 4.8 oz (96.8 kg)   BMI 34.43 kg/m      ASSESSMENT:              Current medications were reviewed today as discussed above.      Medication Adherence:  no issues  identified    Diabetes:  In general, it sounds as if her diet is under carb limits. Blood sugars are slightly above goal, hpwever. A1c is at goal of <8%. Discussed options - in helping her with weight loss and hunger, a GLP-1 RA is likely the best choice, but it means adding another weekly (or daily) injection. Also discussed SGLT2i which are a good fit because of her hx of heart failure and their CV risk reduction. Discussed pros/cons of both options and she would like to start with an oral medication. Her eGFR has remained above 45 which is required for use and will need to be monitored.   We discussed use of a CGM (especially one that has alarms to alert her of a low) but she wants to think about this today.     Sjogren's and polyarthritis: Hydroxychloroquine can take several months to work - but she's nearing the 3 month savana with no relief, so another medication is likely needed.      PLAN:                  1. Ask  at your appointment in August about changing Plaquenil (hydroxychloroquine). This takes about 2-3 months to work, so if you haven't noticed any improvement by July, you may need to switch medications.     2. Start Jardiance 10mg once a day in the morning. Recheck BMP on 6/26 (appt sched)    3. Consider use of CGM    I spent 60 minutes with this patient today. All changes were made via collaborative practice agreement with Dr. Tristan. A copy of the visit note was provided to the patient's primary care provider.     Will follow up in 1 week by phone for SE check.    The patient was given a summary of these recommendations as an after visit summary.    Sabrina Meek, MarcosD, JAMES, BCACP  MTM Pharmacist, [facility]

## 2019-06-26 DIAGNOSIS — I50.30 (HFPEF) HEART FAILURE WITH PRESERVED EJECTION FRACTION (H): ICD-10-CM

## 2019-06-26 PROCEDURE — 80048 BASIC METABOLIC PNL TOTAL CA: CPT | Performed by: INTERNAL MEDICINE

## 2019-06-26 PROCEDURE — 36415 COLL VENOUS BLD VENIPUNCTURE: CPT | Performed by: INTERNAL MEDICINE

## 2019-06-27 DIAGNOSIS — E11.9 TYPE 2 DIABETES MELLITUS WITHOUT COMPLICATION, WITHOUT LONG-TERM CURRENT USE OF INSULIN (H): ICD-10-CM

## 2019-06-27 LAB
ANION GAP SERPL CALCULATED.3IONS-SCNC: 10 MMOL/L (ref 3–14)
BUN SERPL-MCNC: 21 MG/DL (ref 7–30)
CALCIUM SERPL-MCNC: 9.4 MG/DL (ref 8.5–10.1)
CHLORIDE SERPL-SCNC: 108 MMOL/L (ref 94–109)
CO2 SERPL-SCNC: 23 MMOL/L (ref 20–32)
CREAT SERPL-MCNC: 1.08 MG/DL (ref 0.52–1.04)
GFR SERPL CREATININE-BSD FRML MDRD: 49 ML/MIN/{1.73_M2}
GLUCOSE SERPL-MCNC: 142 MG/DL (ref 70–99)
POTASSIUM SERPL-SCNC: 3.9 MMOL/L (ref 3.4–5.3)
SODIUM SERPL-SCNC: 141 MMOL/L (ref 133–144)

## 2019-06-28 RX ORDER — EMPAGLIFLOZIN 10 MG/1
TABLET, FILM COATED ORAL
Start: 2019-06-28

## 2019-06-28 NOTE — TELEPHONE ENCOUNTER
"Too soon.  Refill sent by [facility] 6/18/19 for #30.  Sophia Hemphill, JASON    Requested Prescriptions   Refused Prescriptions Disp Refills     JARDIANCE 10 MG TABS tablet [Pharmacy Med Name: JARDIANCE 10 MG TABLET]       Sig: TAKE 1 TABLET BY MOUTH EVERY DAY       Sodium Glucose Co-Transport Inhibitor Agents Passed - 6/27/2019  6:32 PM        Passed - Blood pressure less than 140/90 in past 6 months     BP Readings from Last 3 Encounters:   06/18/19 108/72   04/16/19 118/78   04/11/19 133/79                 Passed - Patient has documented LDL within the past 12 mos.     Recent Labs   Lab Test 04/09/19  1017   LDL 29             Passed - Patient has had a Microalbumin in the past 15 mos.     Recent Labs   Lab Test 04/09/19  1017   MICROL 28   UMALCR 50.98*             Passed - Patient has documented A1c within the specified period of time.     If HgbA1C is 8 or greater, it needs to be on file within the past 3 months.  If less than 8, must be on file within the past 6 months.     Recent Labs   Lab Test 04/09/19  1017   A1C 7.2*             Passed - No creatinine >1.4 or GFR <45 within the past 12 mos     Recent Labs   Lab Test 06/26/19  1136   GFRESTIMATED 49*   GFRESTBLACK 56*       Recent Labs   Lab Test 06/26/19  1136   CR 1.08*             Passed - Medication is active on med list        Passed - Patient is age 18 or older        Passed - Patient is not pregnant        Passed - Patient has documented normal Potassium within the last 12 mos.     Recent Labs   Lab Test 06/26/19  1136   POTASSIUM 3.9             Passed - Patient has no positive pregnancy test within the past 12 mos.        Passed - Recent (6 mo) or future (30 days) visit within the authorizing provider's specialty     Patient had office visit in the last 6 months or has a visit in the next 30 days with authorizing provider or within the authorizing provider's specialty.  See \"Patient Info\" tab in inbasket, or \"Choose Columns\" in Meds & Orders section of the " refill encounter.

## 2019-07-02 ENCOUNTER — TELEPHONE (OUTPATIENT)
Dept: PHARMACY | Facility: CLINIC | Age: 79
End: 2019-07-02

## 2019-07-02 DIAGNOSIS — E11.9 TYPE 2 DIABETES MELLITUS WITHOUT COMPLICATION, WITHOUT LONG-TERM CURRENT USE OF INSULIN (H): Primary | ICD-10-CM

## 2019-07-02 NOTE — TELEPHONE ENCOUNTER
Called pt to f/u on Jardiance start - no answer, message left. She did have a BMP done - renal function slightly worse. No notes from provider. Will see if we can recheck and if this persists, may consider changing therapy.     Sabrina Meek, PharmD, JAMES, BCACP  MTM Pharmacist, [facility]

## 2019-07-03 NOTE — TELEPHONE ENCOUNTER
Called pt again - doing well on the medication. She hasn't really noticed a difference in her need to urinate and has not had any other SE. Her morning blood sugars are down between 110 and 127 and today before lunch she was around 140.     She is seeing CW for a f/u on 7/10 - will order BMP to have drawn after that visit. Routing note to CW so she's aware.     Sabrina Meek, MarcosD, JAMES, BCACP  MTM Pharmacist, [facility]

## 2019-07-05 DIAGNOSIS — E11.9 TYPE 2 DIABETES MELLITUS WITHOUT COMPLICATION, WITHOUT LONG-TERM CURRENT USE OF INSULIN (H): ICD-10-CM

## 2019-07-05 NOTE — TELEPHONE ENCOUNTER
CW to address at upcoming appt  Lydia HALEY RN    Last Written Prescription Date:  6/18/2019  Last Fill Quantity: 30,  # refills: 0   Last office visit: 4/16/2019 with prescribing provider:     Future Office Visit:   Next 5 appointments (look out 90 days)    Jul 10, 2019 12:45 PM CDT  Office Visit with Ilene Tristan MD  [facility] ([facility]) [address] [facility] [address] [phone]         Requested Prescriptions   Pending Prescriptions Disp Refills     empagliflozin (JARDIANCE) 10 MG TABS tablet 30 tablet 0     Sig: Take 1 tablet (10 mg) by mouth daily       Sodium Glucose Co-Transport Inhibitor Agents Passed - 7/5/2019  2:22 PM        Passed - Blood pressure less than 140/90 in past 6 months     BP Readings from Last 3 Encounters:   06/18/19 108/72   04/16/19 118/78   04/11/19 133/79                 Passed - Patient has documented LDL within the past 12 mos.     Recent Labs   Lab Test 04/09/19  1017   LDL 29             Passed - Patient has had a Microalbumin in the past 15 mos.     Recent Labs   Lab Test 04/09/19  1017   MICROL 28   UMALCR 50.98*             Passed - Patient has documented A1c within the specified period of time.     If HgbA1C is 8 or greater, it needs to be on file within the past 3 months.  If less than 8, must be on file within the past 6 months.     Recent Labs   Lab Test 04/09/19  1017   A1C 7.2*             Passed - No creatinine >1.4 or GFR <45 within the past 12 mos     Recent Labs   Lab Test 06/26/19  1136   GFRESTIMATED 49*   GFRESTBLACK 56*       Recent Labs   Lab Test 06/26/19  1136   CR 1.08*             Passed - Medication is active on med list        Passed - Patient is age 18 or older        Passed - Patient is not pregnant        Passed - Patient has documented normal Potassium within the last 12 mos.     Recent Labs   Lab Test 06/26/19  1136   POTASSIUM 3.9             Passed - Patient has no positive pregnancy test  "within the past 12 mos.        Passed - Recent (6 mo) or future (30 days) visit within the authorizing provider's specialty     Patient had office visit in the last 6 months or has a visit in the next 30 days with authorizing provider or within the authorizing provider's specialty.  See \"Patient Info\" tab in inbasket, or \"Choose Columns\" in Meds & Orders section of the refill encounter.              "

## 2019-07-10 ENCOUNTER — OFFICE VISIT (OUTPATIENT)
Dept: FAMILY MEDICINE | Facility: CLINIC | Age: 79
End: 2019-07-10
Payer: MEDICARE

## 2019-07-10 ENCOUNTER — APPOINTMENT (OUTPATIENT)
Dept: LAB | Facility: CLINIC | Age: 79
End: 2019-07-10
Payer: MEDICARE

## 2019-07-10 VITALS
TEMPERATURE: 99.4 F | OXYGEN SATURATION: 95 % | DIASTOLIC BLOOD PRESSURE: 56 MMHG | BODY MASS INDEX: 33.75 KG/M2 | RESPIRATION RATE: 16 BRPM | HEIGHT: 66 IN | HEART RATE: 46 BPM | WEIGHT: 210 LBS | SYSTOLIC BLOOD PRESSURE: 110 MMHG

## 2019-07-10 DIAGNOSIS — R10.11 RUQ ABDOMINAL PAIN: ICD-10-CM

## 2019-07-10 DIAGNOSIS — E11.9 TYPE 2 DIABETES MELLITUS WITHOUT COMPLICATION, WITHOUT LONG-TERM CURRENT USE OF INSULIN (H): Primary | ICD-10-CM

## 2019-07-10 DIAGNOSIS — M35.02 SJOGREN'S SYNDROME WITH LUNG INVOLVEMENT (H): ICD-10-CM

## 2019-07-10 DIAGNOSIS — R79.89 ELEVATED SERUM CREATININE: ICD-10-CM

## 2019-07-10 DIAGNOSIS — I10 ESSENTIAL HYPERTENSION WITH GOAL BLOOD PRESSURE LESS THAN 140/90: ICD-10-CM

## 2019-07-10 DIAGNOSIS — Z23 NEED FOR SHINGLES VACCINE: ICD-10-CM

## 2019-07-10 DIAGNOSIS — I48.91 ATRIAL FIBRILLATION WITH CONTROLLED VENTRICULAR RESPONSE (H): ICD-10-CM

## 2019-07-10 PROCEDURE — 80048 BASIC METABOLIC PNL TOTAL CA: CPT | Performed by: FAMILY MEDICINE

## 2019-07-10 PROCEDURE — 90750 HZV VACC RECOMBINANT IM: CPT | Performed by: FAMILY MEDICINE

## 2019-07-10 PROCEDURE — 90471 IMMUNIZATION ADMIN: CPT | Performed by: FAMILY MEDICINE

## 2019-07-10 PROCEDURE — 36415 COLL VENOUS BLD VENIPUNCTURE: CPT | Performed by: FAMILY MEDICINE

## 2019-07-10 PROCEDURE — 99214 OFFICE O/P EST MOD 30 MIN: CPT | Mod: 25 | Performed by: FAMILY MEDICINE

## 2019-07-10 RX ORDER — WARFARIN SODIUM 7.5 MG/1
TABLET ORAL
Start: 2019-07-10

## 2019-07-10 ASSESSMENT — MIFFLIN-ST. JEOR: SCORE: 1444.3

## 2019-07-10 NOTE — TELEPHONE ENCOUNTER
"Note fr om 4/16/19 OV:  Last visit in 2/19- switched to xarelto from coumadin at that time.  Pharmacy informed.  Sophia Hemphill RN    Requested Prescriptions   Pending Prescriptions Disp Refills     warfarin (COUMADIN) 7.5 MG tablet [Pharmacy Med Name: WARFARIN SODIUM 7.5 MG TABLET] 90 tablet 3     Sig: TAKE 1 TABLET BY MOUTH DAILY. CURRENT DOSE IS 7.5 MG DAILY. DOSE ADJUSTED PER INR RESULT.       Vitamin K Antagonists Failed - 7/10/2019  1:40 AM        Failed - INR is within goal in the past 6 weeks     Confirm INR is within goal in the past 6 weeks.     Recent Labs   Lab Test 02/14/19  1510   INR 2.37*                       Failed - Medication is active on med list        Passed - Recent (12 mo) or future (30 days) visit within the authorizing provider's specialty     Patient had office visit in the last 12 months or has a visit in the next 30 days with authorizing provider or within the authorizing provider's specialty.  See \"Patient Info\" tab in inbasket, or \"Choose Columns\" in Meds & Orders section of the refill encounter.              Passed - Patient is 18 years of age or older        Passed - Patient is not pregnant        Passed - No positive pregnancy on file in past 12 months          "

## 2019-07-10 NOTE — PROGRESS NOTES
Subjective   Betty Tee is a 79 year old female who presents to clinic today for the following health issues:    HPI   Diabetes Follow-up    How often are you checking your blood sugar? Three times daily    What time of day are you checking your blood sugars (select all that apply)?  Before meals    Have you had any blood sugars above 200?  No    Have you had any blood sugars below 70?  No    What symptoms do you notice when your blood sugar is low?  None    What concerns do you have today about your diabetes? None     Do you have any of these symptoms? (Select all that apply)  Excessive thirst     Have you had a diabetic eye exam in the last 12 months? Yes- Date of last eye exam: 2/13/19    Saw MTM and started Jardiance last month.  No se's on it as far as she is aware.  No increase in urination since starting, though always has to urinate a lot at baseline.  Not as hungry between meals, so that is better, but is only down 2 lbs today- disappointed.  Lisinopril was stopped in 2/19 due to lower BP.    Glucose strips- has to have a new rx that says she's checking 3x/day.  Checking prior to three meals/day.  Lower in morning with the Jardiance- 110-130 (prior they were 150+).    Sharp pain in RUQ, worse with deep breaths, doing on all week.  Worse with movement, getting up from sitting, getting into car.  Not more SOB than her baseline.  Sleeping okay but it hurts when she moves.  Started about a week ago, not getting better or worse.    Rheum- on plaquenil  Did see eye doctor - full exam in 2/13/19.  Prednisone- continuing to lower the dose-   Since 5/13/19, on 6mg one day, alternating with 8mg.  Down from 7mg and 9mg alternating.  Mild increase in jt pains since, taking more tylenol.  Much harder to get around compared to when she was on 10mg/d.    Shingles shot- okay to get here - checked with insurance.    BP Readings from Last 2 Encounters:   07/11/19 130/80   07/10/19 110/56     Hemoglobin A1C (%)   Date  Value   04/09/2019 7.2 (H)   10/02/2018 6.7 (H)     LDL Cholesterol Calculated (mg/dL)   Date Value   04/09/2019 29   05/23/2018 21       Diabetes Management Resources    Amount of exercise or physical activity: None    Problems taking medications regularly: No    Medication side effects: none    Diet: regular (no restrictions)      Patient Active Problem List   Diagnosis     Temporomandibular joint disorder     Diverticulitis of colon     Disorder of bone and cartilage     Osteoarthritis     Alcohol abuse, in remission     Restless legs syndrome (RLS)     Major depressive disorder, recurrent episode, moderate (H)     Essential hypertension with goal blood pressure less than 140/90     Advanced directives, counseling/discussion     Colouterine fistula     Permanent atrial fibrillation (H)     Left ventricular hypertrophy     Health Care Home     Insomnia     Joint pains     Allergic reaction caused by a drug - likely plaquenil     Breast fibroadenoma     DVT (deep venous thrombosis) (H)     Fatty liver disease, nonalcoholic     Rib pain     Neck mass     Gastroesophageal reflux disease without esophagitis     Enlarged lymph node     Sjogren's syndrome with lung involvement (H)     ANGELICA (obstructive sleep apnea)     ILD (interstitial lung disease) (H)     Lower GI bleed     Acute and chronic respiratory failure with hypoxia (H)     (HFpEF) heart failure with preserved ejection fraction (H)     Type 2 diabetes mellitus with hyperglycemia, with long-term current use of insulin (H)     Heart failure, chronic, with acute decompensation (H)     Hyperlipidemia LDL goal <100     Long term current use of anticoagulant therapy     Inflammatory arthritis     Follicular bronchiolitis (H)     Past Surgical History:   Procedure Laterality Date     BACK SURGERY  1962     BIOPSY BREAST  9/27/02    Biopsy Left Breast     C APPENDECTOMY  1970's?     C NONSPECIFIC PROCEDURE  11/05    exploratory abd lap, adhesions, resolved RLQ pain,  diverticulitis episodes     CARDIAC SURGERY       CHOLECYSTECTOMY  ?     COLECTOMY LEFT  2011    Procedure:COLECTOMY LEFT; Laparoscopic mobilization of splenic flexture, sigmoid colectomy, coloprotoscopy, loop illeostomy; Surgeon:CK CASTANEDA; Location:[facility]     HYSTERECTOMY TOTAL ABDOMINAL, BILATERAL SALPINGO-OOPHORECTOMY, COMBINED  2011    Procedure:COMBINED HYSTERECTOMY TOTAL ABDOMINAL, BILATERAL SALPINGO-OOPHORECTOMY; total abdominal hysterectomy, bilateral salpingo-oophorectomy; Surgeon:ALETA MANUEL; Location:[facility]     INSERT STENT URETER  2011    Procedure:INSERT STENT URETER; Placement of Bilateral Ureteral Stents ; Surgeon:PRANEETH BRYANT; Location:[facility]     SIGMOIDOSCOPY FLEXIBLE  11/3/2011    Procedure:SIGMOIDOSCOPY FLEXIBLE; Flexible Sigmoidoscopy; Surgeon:CK CASTANEDA; Location:[facility]     TAKEDOWN ILEOSTOMY  2012    Procedure:TAKEDOWN ILEOSTOMY; Takedown Loop Ileostomy ; Surgeon:CK CASTANEDA; Location:[facility]       Social History     Tobacco Use     Smoking status: Former Smoker     Packs/day: 0.50     Years: 10.00     Pack years: 5.00     Types: Cigarettes     Last attempt to quit: 2011     Years since quittin.9     Smokeless tobacco: Never Used     Tobacco comment:  ppd   Substance Use Topics     Alcohol use: No     Alcohol/week: 0.0 oz     Comment: In recovery beginning      Family History   Problem Relation Age of Onset     C.A.D. Mother 63        MI- first at age 63     Heart Disease Mother      Hypertension Mother      Cerebrovascular Disease Mother      Hyperlipidemia Mother      Alcohol/Drug Father      Alzheimer Disease Father      Dementia Father      Hypertension Father      Hyperlipidemia Father      Diabetes Sister      C.A.D. Sister 52        Minor MI- age 50's     Heart Disease Sister      Hypertension Sister      Hypertension Sister      Hypertension Brother      Cancer - colorectal Sister 48        Late 40's early 50's      Prostate Cancer Brother 74        Dx'd age 74     Gastrointestinal Disease Sister         Diverticulitis     Gastrointestinal Disease Brother         Diverticulitis     Lipids Sister      Lipids Sister      Parkinsonism Brother      Diabetes Sister      Heart Disease Sister         CHF     Cancer Sister         lung, smoker     Substance Abuse Sister      Substance Abuse Brother      Asthma Sister      Cancer Sister      Breast Cancer Daughter      Prostate Cancer Brother      Hyperlipidemia Brother      Diabetes Other      Hypertension Other          Current Outpatient Medications   Medication Sig Dispense Refill     acetaminophen (TYLENOL) 500 MG tablet Take 500 mg by mouth nightly as needed        albuterol (PROAIR HFA, PROVENTIL HFA, VENTOLIN HFA) 108 (90 BASE) MCG/ACT inhaler Inhale 2 puffs into the lungs every 6 hours 1 Inhaler 3     atorvastatin (LIPITOR) 10 MG tablet TAKE 1 TABLET BY MOUTH EVERY DAY 90 tablet 0     azithromycin (ZITHROMAX) 250 MG tablet Take 1 tablet (250 mg) by mouth daily 30 tablet 11     BD JANE U/F 32G X 4 MM insulin pen needle USE 4 DAILY AS DIRECTED. 400 each 1     blood glucose (ACCU-CHEK SHANNON PLUS) test strip USE TO TEST BLOOD SUGARS 3 TIMES DAILY 400 strip 3     blood glucose monitoring (ACCU-CHEK SHANNON PLUS) test strip Use to test blood sugar 4 times daily or as directed.  Ok to substitute alternative if insurance prefers. 120 strip 11     blood glucose monitoring (ACCU-CHEK FASTCLIX) lancets Use to test blood sugar 4 times daily or as directed.  Ok to substitute alternative if insurance prefers. 1 Box 11     blood glucose monitoring (ACCU-CHEK MULTICLIX) lancets Use to test blood sugar 4 times daily or as directed. 4 Box 3     COMPRESSION STOCKINGS Wear compression stockings in affected leg (right leg) or both legs most of the time during the day and take them off at night. 2 each 2     empagliflozin (JARDIANCE) 10 MG TABS tablet Take 1 tablet (10 mg) by mouth daily 30 tablet 0      escitalopram (LEXAPRO) 20 MG tablet TAKE 1 TABLET BY MOUTH EVERY DAY 90 tablet 0     fluticasone (FLONASE) 50 MCG/ACT nasal spray Spray 1-2 sprays into both nostrils daily as needed for allergies 1 Bottle 3     fluticasone (FLOVENT HFA) 220 MCG/ACT inhaler Inhale 2 puffs into the lungs 2 times daily 3 Inhaler 3     Humidifier MISC Continuous humidified air via concentrator.   Diagnosis: ILD  Duration: Lifetime 99. 1 each 1     hydroxychloroquine (PLAQUENIL) 200 MG tablet Take 2 tablets (400 mg) by mouth daily Annual Plaquenil toxicity eye screening required. 180 tablet 1     insulin glargine (BASAGLAR KWIKPEN) 100 UNIT/ML pen INJECT 25 UNITS SUBCUTANEOUS DAILY (TO REPLACE LANTUS) 15 mL 1     ketoconazole (NIZORAL) 2 % external cream Apply topically 2 times daily 60 g 1     loratadine (CLARITIN) 10 MG tablet Take 1 tablet (10 mg) by mouth daily 90 tablet 1     metFORMIN (GLUCOPHAGE-XR) 500 MG 24 hr tablet TAKE 2 TABLETS BY MOUTH DAILY WITH DINNER 180 tablet 0     metoprolol succinate (TOPROL XL) 25 MG 24 hr tablet Take 0.5 tablets (12.5 mg) by mouth 2 times daily Take 50 mg by mouth in combination with 12.5 for a total of 62.5 twice a day 90 tablet 3     metoprolol succinate (TOPROL-XL) 100 MG 24 hr tablet Take 50 mg by mouth in combination with 12.5 for a total of 62.5 twice a day 90 tablet 3     montelukast (SINGULAIR) 10 MG tablet TAKE 1 TABLET BY MOUTH EVERYDAY AT BEDTIME 90 tablet 0     NOVOLOG FLEXPEN 100 UNIT/ML soln INJECT 12 UNITS SUBCUTANEOUS 3 TIMES DAILY (WITH MEALS) 15 mL 2     order for DME Please provide patient with a POC.  Okay to test patient on POC to maintain oxygen saturations above 90%.   Patient currently uses 2 to 3 LPM oxygen with activity. 1 Device 0     order for DME Equipment being ordered: Oxygen  Pulsed oxygen portable tank  Rate: 4LNC  Diagnosis: ILD  Duration: Lifetime -99 1 Device 1     order for DME Equipment being ordered: Full face mask for CPAP - size: F10 large 1 each 0      order for DME Oxygen: Patient requires supplemental Oxygen 4 LPM via nasal canula with activity. Please provide patient with a home unit and with portability capability. Oxygen will be for a lifetime. 1 Device 0     potassium chloride SA (K-DUR/KLOR-CON M) 20 MEQ CR tablet Take 2 tabs (40 meq) in am and 1 tab (20 meq) in pm daily 270 tablet 3     predniSONE (DELTASONE) 5 MG tablet Starting 5/13/19 alternate between 6 and 8mg once a day 90 tablet 0     rivaroxaban ANTICOAGULANT (XARELTO) 20 MG TABS tablet Take 1 tablet (20 mg) by mouth daily (with dinner) 90 tablet 3     spironolactone (ALDACTONE) 25 MG tablet Take 2 tablets (50 mg) by mouth daily 180 tablet 3     torsemide (DEMADEX) 10 MG tablet Take one tab (10 mg) with one 20 mg tab to = 30 mg daily in afternoon. 90 tablet 3     torsemide (DEMADEX) 20 MG tablet Take 2 tabs (40 mg) in am daily 105 tablet 10     traZODone (DESYREL) 50 MG tablet Take 0.5-1 tablets (25-50 mg) by mouth nightly as needed for sleep 30 tablet 5     acyclovir (ZOVIRAX) 400 MG tablet Take 1 tablet (400 mg) by mouth 3 times daily For a couple days 15 tablet 2     acyclovir (ZOVIRAX) 5 % external ointment Apply topically 6 times daily As needed for outbreaks 15 g 3     lidocaine (LIDODERM) 5 % patch Apply patch to painful area for up to 12 h within a 24 h period.  Remove after 12 hours. 30 patch 5     tiZANidine (ZANAFLEX) 2 MG tablet Take 1-2 tablets (2-4 mg) by mouth 3 times daily 90 tablet 1     Allergies   Allergen Reactions     Amoxicillin-Pot Clavulanate      Augmentin Nausea and Vomiting     Codeine Nausea and Vomiting     Codeine      Phenobarbital Itching     Phenobarbital      Seasonal Allergies      Recent Labs   Lab Test 07/10/19  1240 06/26/19  1136 04/09/19  1017  10/02/18  1524 05/23/18  1028  02/21/18  1230  10/06/17  1421  08/01/17  1146   A1C  --   --  7.2*  --  6.7* 6.9*  --   --    < > 7.3*  --   --    LDL  --   --  29  --   --  21  --  5  --   --   --   --    HDL  --   --  " 59  --   --  53  --  64  --   --   --   --    TRIG  --   --  148  --   --  113  --  188*  --   --   --   --    ALT  --   --   --   --   --  21  --   --   --  43  --  45   CR 1.39* 1.08* 0.85   < >  --  0.90   < > 0.88   < > 0.94   < > 0.90   GFRESTIMATED 36* 49* 65   < >  --  61   < > 62   < > 58*   < > 61   GFRESTBLACK 42* 56* 76   < >  --  74   < > 76   < > 70   < > 74   POTASSIUM 3.8 3.9 3.9   < >  --  3.5   < > 3.9   < > 4.3   < > 3.5   TSH  --   --  2.72  --   --  2.42  --   --    < >  --   --   --     < > = values in this interval not displayed.      BP Readings from Last 3 Encounters:   07/11/19 130/80   07/10/19 110/56   06/18/19 108/72    Wt Readings from Last 3 Encounters:   07/11/19 95.3 kg (210 lb)   07/10/19 95.3 kg (210 lb)   06/18/19 96.8 kg (213 lb 4.8 oz)            Reviewed and updated as needed this visit by Provider  Tobacco  Allergies  Meds  Problems  Med Hx  Surg Hx  Fam Hx         Review of Systems   ROS COMP: Constitutional, HEENT, cardiovascular, pulmonary, gi and gu systems are negative, except as otherwise noted.      Objective    /56 (BP Location: Left arm, Patient Position: Sitting, Cuff Size: Adult Large)   Pulse (!) 46   Temp 99.4  F (37.4  C) (Oral)   Resp 16   Ht 1.676 m (5' 6\")   Wt 95.3 kg (210 lb)   SpO2 95%   Breastfeeding? No   BMI 33.89 kg/m    Body mass index is 33.89 kg/m .  Physical Exam   GENERAL APPEARANCE: healthy, alert and no distress     EYES: PERRL, sclera clear     HENT: nose and mouth without ulcers or lesions     NECK: no adenopathy, no asymmetry, masses, or scars and thyroid normal to palpation     RESP: lungs clear to auscultation - no rales, rhonchi or wheezes     CV: regular rates and rhythm, normal S1 S2, no S3 or S4 and no murmur, click or rub      Abdomen: soft, nontender in lower abd, does have significant pain to pressure to superficial pressure to lower R rib line, midway between midline and lateral ribs, mild pain to superficial " pressure surrounding this area, no deep pain, no HSM or masses and bowel sounds normal     Ext: warm, dry, no edema     Diagnostic Test Results:  Labs reviewed in Epic  Results for orders placed or performed in visit on 07/10/19   Basic metabolic panel   Result Value Ref Range    Sodium 141 133 - 144 mmol/L    Potassium 3.8 3.4 - 5.3 mmol/L    Chloride 108 94 - 109 mmol/L    Carbon Dioxide 25 20 - 32 mmol/L    Anion Gap 8 3 - 14 mmol/L    Glucose 182 (H) 70 - 99 mg/dL    Urea Nitrogen 16 7 - 30 mg/dL    Creatinine 1.39 (H) 0.52 - 1.04 mg/dL    GFR Estimate 36 (L) >60 mL/min/[1.73_m2]    GFR Estimate If Black 42 (L) >60 mL/min/[1.73_m2]    Calcium 9.2 8.5 - 10.1 mg/dL     *Note: Due to a large number of results and/or encounters for the requested time period, some results have not been displayed. A complete set of results can be found in Results Review.           Assessment & Plan       ICD-10-CM    1. Type 2 diabetes mellitus without complication, without long-term current use of insulin (H) E11.9 Basic metabolic panel     blood glucose (ACCU-CHEK SHANNON PLUS) test strip   2. RUQ abdominal pain R10.11    3. Elevated serum creatinine R79.89    4. Essential hypertension with goal blood pressure less than 140/90 I10    5. Sjogren's syndrome with lung involvement (H) M35.02    6. Need for shingles vaccine Z23 INJECTION INTRAMUSCULAR OR SUB-Q     DM II- pt started on jardiance last month after visit with MTM.  Tolerating it well, no known se's.  Did have BMP done last month which showed worsening kidney function- will recheck today.    HTN/elevated creatinine- BP lower end of normal today, despite stopping the lisinopril 2.5mg/d in 2/19 by cardiology.  Still on torsemide, toprol XL and spironolactone 50mg/d.  Will await BMP as above.    RUQ pain, superficial point tenderness along right lower ribs, tender to touch in this area.  No skin changes, no pain to deeper palpation of abd.  Unsure etiology, but doubt pulmonary or  "abd etiology, no trauma or pain to pressure of ribs from side/back.  Could consider CT scan of chest/abd/chest wall if sx's continue, but pt would like to hold off for now.  She has f/u with pulmonary tomorrow, so will see if they would like to do any w/u sooner.    Sjogrens/inflammatory joints- pt has been having increased generalized joint pains with lower prednisone dosing which has been difficult for her.      Zoster vaccine- Risks/benefits discussed, given today.  Will plan for second dose in 2-6 months, likely at a f/u appt.      Return in about 2 months (around 9/10/2019) for Diabetes, chronic cares, sooner if symptoms worsen/persist.  Recommend seeing MTM that day as well.        BMI:   Estimated body mass index is 33.89 kg/m  as calculated from the following:    Height as of this encounter: 1.676 m (5' 6\").    Weight as of this encounter: 95.3 kg (210 lb).   Weight management plan: Discussed healthy diet and exercise guidelines      Ilene Tristan MD  [facility]        "

## 2019-07-10 NOTE — NURSING NOTE
"Chief Complaint   Patient presents with     Diabetes     /56 (BP Location: Left arm, Patient Position: Sitting, Cuff Size: Adult Large)   Pulse (!) 46   Temp 99.4  F (37.4  C) (Oral)   Resp 16   Ht 1.676 m (5' 6\")   Wt 95.3 kg (210 lb)   SpO2 95%   Breastfeeding? No   BMI 33.89 kg/m   Estimated body mass index is 33.89 kg/m  as calculated from the following:    Height as of this encounter: 1.676 m (5' 6\").    Weight as of this encounter: 95.3 kg (210 lb).  bp completed using cuff size: large       Health Maintenance addressed:  Shingles     Will call insurance.    Hany Mitchell MA     "

## 2019-07-11 ENCOUNTER — ANCILLARY PROCEDURE (OUTPATIENT)
Dept: GENERAL RADIOLOGY | Facility: CLINIC | Age: 79
End: 2019-07-11
Attending: INTERNAL MEDICINE
Payer: MEDICARE

## 2019-07-11 ENCOUNTER — OFFICE VISIT (OUTPATIENT)
Dept: PHARMACY | Facility: CLINIC | Age: 79
End: 2019-07-11
Payer: COMMERCIAL

## 2019-07-11 ENCOUNTER — OFFICE VISIT (OUTPATIENT)
Dept: PULMONOLOGY | Facility: CLINIC | Age: 79
End: 2019-07-11
Attending: INTERNAL MEDICINE
Payer: MEDICARE

## 2019-07-11 VITALS
WEIGHT: 210 LBS | SYSTOLIC BLOOD PRESSURE: 130 MMHG | RESPIRATION RATE: 18 BRPM | BODY MASS INDEX: 33.75 KG/M2 | DIASTOLIC BLOOD PRESSURE: 80 MMHG | OXYGEN SATURATION: 97 % | HEIGHT: 66 IN | HEART RATE: 52 BPM

## 2019-07-11 DIAGNOSIS — M54.6 ACUTE RIGHT-SIDED THORACIC BACK PAIN: Primary | ICD-10-CM

## 2019-07-11 DIAGNOSIS — J44.89 FOLLICULAR BRONCHIOLITIS (H): ICD-10-CM

## 2019-07-11 DIAGNOSIS — M54.6 ACUTE RIGHT-SIDED THORACIC BACK PAIN: ICD-10-CM

## 2019-07-11 DIAGNOSIS — E11.9 TYPE 2 DIABETES MELLITUS WITHOUT COMPLICATION, WITHOUT LONG-TERM CURRENT USE OF INSULIN (H): Primary | ICD-10-CM

## 2019-07-11 LAB
ANION GAP SERPL CALCULATED.3IONS-SCNC: 8 MMOL/L (ref 3–14)
BUN SERPL-MCNC: 16 MG/DL (ref 7–30)
CALCIUM SERPL-MCNC: 9.2 MG/DL (ref 8.5–10.1)
CHLORIDE SERPL-SCNC: 108 MMOL/L (ref 94–109)
CO2 SERPL-SCNC: 25 MMOL/L (ref 20–32)
CREAT SERPL-MCNC: 1.39 MG/DL (ref 0.52–1.04)
GFR SERPL CREATININE-BSD FRML MDRD: 36 ML/MIN/{1.73_M2}
GLUCOSE SERPL-MCNC: 182 MG/DL (ref 70–99)
POTASSIUM SERPL-SCNC: 3.8 MMOL/L (ref 3.4–5.3)
SODIUM SERPL-SCNC: 141 MMOL/L (ref 133–144)

## 2019-07-11 PROCEDURE — 99607 MTMS BY PHARM ADDL 15 MIN: CPT | Performed by: PHARMACIST

## 2019-07-11 PROCEDURE — 99606 MTMS BY PHARM EST 15 MIN: CPT | Performed by: PHARMACIST

## 2019-07-11 PROCEDURE — G0463 HOSPITAL OUTPT CLINIC VISIT: HCPCS | Mod: ZF

## 2019-07-11 ASSESSMENT — PAIN SCALES - GENERAL: PAINLEVEL: NO PAIN (0)

## 2019-07-11 ASSESSMENT — MIFFLIN-ST. JEOR: SCORE: 1444.3

## 2019-07-11 NOTE — NURSING NOTE
Chief Complaint   Patient presents with     Interstitial Lung Disease (ILD)     Follow up on Betty and her Sjogrens     Wayne Nuñez CMA at 11:52 AM on 7/11/2019

## 2019-07-11 NOTE — PROGRESS NOTES
SUBJECTIVE/OBJECTIVE:                Betty Tee is a 79 year old female called for a follow-up visit for Medication Therapy Management.  She was referred to me from Dr. Tristan.     Chief Complaint: Follow up from our visit on 6/18/19.  Follow-up on recent lab work.     Tobacco: No tobacco use  Alcohol: not currently using    Medication Adherence/Access: no issues reported    Diabetes:  Pt currently taking Jardiance 10mg daily, Basaglar 25 units, metformin ER 1000mg with dinner, Novolog 12 units TID with meals. Pt is not experiencing side effects..   Pt started Jardiance on 6/18, BMP drawn on 6/26 showed increase in SCr and decrease in eGFR. BMP was repeated on 7/10 and showed continued worsening of SCr and eGFR.         ASSESSMENT:              Current medications were reviewed today as discussed above.      Medication Adherence: no issues identified    Diabetes: Needs Improvement - given labs, need to stop Jardiance.  Discussed options, I believe Ozempic, Bydureon and Victoza are covered by her insurance. Pt prefers once weekly to daily therapy. Clinical trial data shows Ozempic has more benefit compared to Bydureon and likely better CV effects as well. Will decrease mealtime insulin to prevent hypoglycemia. Discussed hope is that we can reduce/eliminate need for insulin to help with hunger and weight gain.     PLAN:                  1. Stop Jardiance - wait two weeks then:   2. Start Ozempic 0.25mg once weekly x 4 weeks, then increase to 0.5mg weekly thereafter (called pharmacy - confirmed this went through for $40 copay)  3. Once she starts Ozempic, decrease Novolog to 8 units with meals.  4. Repeat BMP on 8/8 with cardiology labs     I spent 20 minutes with this patient today. All changes were made via collaborative practice agreement with Dr. Tristan. A copy of the visit note was provided to the patient's primary care provider.     Will follow up in 2 weeks by phone, sooner if needed.    The patient was  mailed a summary of these recommendations as an after visit summary.    Marcos NicoleD, JAMES, BCACP  MTM Pharmacist, [facility]

## 2019-07-11 NOTE — PROGRESS NOTES
[facility]    Reason for Visit  Betty Tee is a 79 year old year old female who is being seen for Interstitial Lung Disease (ILD) (Follow up on Betty and her Sjogrens)    HPI    Sister Sita is a 79-year-old who has Sjogren's follicular bronchiolitis and history of ILD who is here for follow-up.  She had mild ILD on a previous chest CT scan in 2015, but follow-up chest CT scan in 2018 showed improvement with no obvious ILD present on the CT scan.  The follicular bronchiolitis changes were still present.  She has been on prednisone for her Sjogren's for 3 years or so, and that is managed by Dr. Stringer in rheumatology.  Her current prednisone dose is 8 mg alternating with 6 mg.      Her main complaint today is sharp pain under her right breast and also in her right back.  This has been present for approximately 1 week.  She denies trauma to that area or any sudden movements or twisting.  The pain occurs when she takes a breath in or when she twists.  She denies any rash or blisters in that area.  She has never had kidney stones, however her serum creatinine had increased when she saw her primary care provider yesterday.  I started azithromycin in March of this year as an additional anti-inflammatory for her lungs, and she reports no side effects.  She continues to use Flovent inhaler.  She uses an albuterol inhaler as needed and only rarely.        Current Outpatient Medications   Medication     acetaminophen (TYLENOL) 500 MG tablet     acyclovir (ZOVIRAX) 400 MG tablet     acyclovir (ZOVIRAX) 5 % external ointment     albuterol (PROAIR HFA, PROVENTIL HFA, VENTOLIN HFA) 108 (90 BASE) MCG/ACT inhaler     atorvastatin (LIPITOR) 10 MG tablet     azithromycin (ZITHROMAX) 250 MG tablet     BD JANE U/F 32G X 4 MM insulin pen needle     blood glucose (ACCU-CHEK SHANNON PLUS) test strip     blood glucose monitoring (ACCU-CHEK SHANNON PLUS) test strip     blood glucose  monitoring (ACCU-CHEK FASTCLIX) lancets     blood glucose monitoring (ACCU-CHEK MULTICLIX) lancets     COMPRESSION STOCKINGS     empagliflozin (JARDIANCE) 10 MG TABS tablet     escitalopram (LEXAPRO) 20 MG tablet     fluticasone (FLONASE) 50 MCG/ACT nasal spray     fluticasone (FLOVENT HFA) 220 MCG/ACT inhaler     Humidifier MISC     hydroxychloroquine (PLAQUENIL) 200 MG tablet     insulin glargine (BASAGLAR KWIKPEN) 100 UNIT/ML pen     ketoconazole (NIZORAL) 2 % external cream     lidocaine (LIDODERM) 5 % patch     loratadine (CLARITIN) 10 MG tablet     metFORMIN (GLUCOPHAGE-XR) 500 MG 24 hr tablet     metoprolol succinate (TOPROL XL) 25 MG 24 hr tablet     metoprolol succinate (TOPROL-XL) 100 MG 24 hr tablet     montelukast (SINGULAIR) 10 MG tablet     NOVOLOG FLEXPEN 100 UNIT/ML soln     order for DME     order for DME     order for DME     order for DME     potassium chloride SA (K-DUR/KLOR-CON M) 20 MEQ CR tablet     predniSONE (DELTASONE) 5 MG tablet     rivaroxaban ANTICOAGULANT (XARELTO) 20 MG TABS tablet     spironolactone (ALDACTONE) 25 MG tablet     tiZANidine (ZANAFLEX) 2 MG tablet     torsemide (DEMADEX) 10 MG tablet     torsemide (DEMADEX) 20 MG tablet     traZODone (DESYREL) 50 MG tablet     No current facility-administered medications for this visit.      Allergies   Allergen Reactions     Amoxicillin-Pot Clavulanate      Augmentin Nausea and Vomiting     Codeine Nausea and Vomiting     Codeine      Phenobarbital Itching     Phenobarbital      Seasonal Allergies      Past Medical History:   Diagnosis Date     Alcohol abuse, in remission      Allergic rhinitis, cause unspecified     allegra helps when she takes it     Antiplatelet or antithrombotic long-term use      Atrial fibrillation (H)     in hosp in 11/11 after surgery w/ fluid overload     Cardiomegaly     LVH on stress echo- cardiac w/u at [facility]- neg CT scan for PE, neg stress echo in 8/06     Chest pain, unspecified      Disorder of bone  and cartilage, unspecified     osteopenia (had been on prempro), improved on 6/06 dexa, stable dexa 11/10     Diverticulosis of colon (without mention of hemorrhage)     last episode yrs ago     Essential hypertension, benign      Follicular bronchiolitis (H)     associated with Sjogrens, dx by chest CT showing mosaic attenuation and air trapping     Gastro-oesophageal reflux disease      ILD (interstitial lung disease) (H)     associated with Sjogrens, also has mildly elevated IgG4, first noted on chest CT 2015 (mild changes) and also has small airways disease; ILD improved on follow up chest CT 2018.     Insomnia, unspecified     weaned off clonazepam     Irregular heart beat      Lumbago 7/09    MRI with DJD, now seeing Dr. Cain for sciatic sx's     Major depressive disorder, recurrent episode, moderate (H)      Obstructive sleep apnea      Osteoarthrosis, unspecified whether generalized or localized, unspecified site      Sjogren's syndrome (H)     + RG and SSA and lip bx     Sleep apnea      Tobacco use disorder     chantix in 9/07, started again in 6/08, working       Past Surgical History:   Procedure Laterality Date     BACK SURGERY  1962     BIOPSY BREAST  9/27/02    Biopsy Left Breast     C APPENDECTOMY  1970's?     C NONSPECIFIC PROCEDURE  11/05    exploratory abd lap, adhesions, resolved RLQ pain, diverticulitis episodes     CARDIAC SURGERY       CHOLECYSTECTOMY  1990's?     COLECTOMY LEFT  11/7/2011    Procedure:COLECTOMY LEFT; Laparoscopic mobilization of splenic flexture, sigmoid colectomy, coloprotoscopy, loop illeostomy; Surgeon:CK CASTANEDA; Location:[facility]     HYSTERECTOMY TOTAL ABDOMINAL, BILATERAL SALPINGO-OOPHORECTOMY, COMBINED  11/7/2011    Procedure:COMBINED HYSTERECTOMY TOTAL ABDOMINAL, BILATERAL SALPINGO-OOPHORECTOMY; total abdominal hysterectomy, bilateral salpingo-oophorectomy; Surgeon:ALETA MANUEL; Location:[facility]     INSERT STENT URETER  11/7/2011    Procedure:INSERT STENT URETER;  Placement of Bilateral Ureteral Stents ; Surgeon:PRANEETH BRYANT; Location:[facility]     SIGMOIDOSCOPY FLEXIBLE  11/3/2011    Procedure:SIGMOIDOSCOPY FLEXIBLE; Flexible Sigmoidoscopy; Surgeon:CK CASTANEDA; Location:[facility]     TAKEDOWN ILEOSTOMY  2012    Procedure:TAKEDOWN ILEOSTOMY; Takedown Loop Ileostomy ; Surgeon:CK CASTANEDA; Location:[facility]       Social History     Socioeconomic History     Marital status: Single     Spouse name: Not on file     Number of children: 0     Years of education: Ed Spec De     Highest education level: Not on file   Occupational History     Occupation: Professor     Employer: [employer]     Comment: [employer]- Education   Social Needs     Financial resource strain: Not on file     Food insecurity:     Worry: Not on file     Inability: Not on file     Transportation needs:     Medical: Not on file     Non-medical: Not on file   Tobacco Use     Smoking status: Former Smoker     Packs/day: 0.50     Years: 10.00     Pack years: 5.00     Types: Cigarettes     Last attempt to quit: 2011     Years since quittin.9     Smokeless tobacco: Never Used     Tobacco comment:  ppd   Substance and Sexual Activity     Alcohol use: No     Alcohol/week: 0.0 oz     Comment: In recovery beginning      Drug use: No     Sexual activity: Never   Lifestyle     Physical activity:     Days per week: Not on file     Minutes per session: Not on file     Stress: Not on file   Relationships     Social connections:     Talks on phone: Not on file     Gets together: Not on file     Attends Yazdanism service: Not on file     Active member of club or organization: Not on file     Attends meetings of clubs or organizations: Not on file     Relationship status: Not on file     Intimate partner violence:     Fear of current or ex partner: Not on file     Emotionally abused: Not on file     Physically abused: Not on file     Forced sexual activity: Not on  "file   Other Topics Concern     Parent/sibling w/ CABG, MI or angioplasty before 65F 55M? Not Asked   Social History Narrative                   Family History   Problem Relation Age of Onset     C.A.D. Mother 63        MI- first at age 63     Heart Disease Mother      Hypertension Mother      Cerebrovascular Disease Mother      Hyperlipidemia Mother      Alcohol/Drug Father      Alzheimer Disease Father      Dementia Father      Hypertension Father      Hyperlipidemia Father      Diabetes Sister      C.A.D. Sister 52        Minor MI- age 50's     Heart Disease Sister      Hypertension Sister      Hypertension Sister      Hypertension Brother      Cancer - colorectal Sister 48        Late 40's early 50's     Prostate Cancer Brother 74        Dx'd age 74     Gastrointestinal Disease Sister         Diverticulitis     Gastrointestinal Disease Brother         Diverticulitis     Lipids Sister      Lipids Sister      Parkinsonism Brother      Diabetes Sister      Heart Disease Sister         CHF     Cancer Sister         lung, smoker     Substance Abuse Sister      Substance Abuse Brother      Asthma Sister      Cancer Sister      Breast Cancer Daughter      Prostate Cancer Brother      Hyperlipidemia Brother      Diabetes Other      Hypertension Other              ROS Pulmonary  A complete ROS was otherwise negative except as noted in the HPI.    Vitals: /80   Pulse 52   Resp 18   Ht 1.676 m (5' 6\")   Wt 95.3 kg (210 lb)   SpO2 97%   BMI 33.89 kg/m      Exam:   GENERAL APPEARANCE: Well developed, well nourished, alert, and in no apparent distress.  RESP: good air flow throughout.  No crackles. No rhonchi. No wheezes.  No inspiratory squeaks.  CV: Normal S1, S2, regular rhythm, normal rate. No murmur.  No LE edema.   MS: extremities normal. No clubbing. No cyanosis.  There is point tenderness in the right upper quadrant underneath her right breast.  I cannot tell if it is over a rib.  There also is point " tenderness in her right back which also could be over a rib.  There is no flank tenderness to palpation.  SKIN: no rash on limited exam.  NEURO: Mentation intact, speech normal, normal gait and stance.  PSYCH: mentation appears normal. and affect normal/bright.    Results:  Recent Results (from the past 168 hour(s))   Basic metabolic panel    Collection Time: 07/10/19 12:40 PM   Result Value Ref Range    Sodium 141 133 - 144 mmol/L    Potassium 3.8 3.4 - 5.3 mmol/L    Chloride 108 94 - 109 mmol/L    Carbon Dioxide 25 20 - 32 mmol/L    Anion Gap 8 3 - 14 mmol/L    Glucose 182 (H) 70 - 99 mg/dL    Urea Nitrogen 16 7 - 30 mg/dL    Creatinine 1.39 (H) 0.52 - 1.04 mg/dL    GFR Estimate 36 (L) >60 mL/min/[1.73_m2]    GFR Estimate If Black 42 (L) >60 mL/min/[1.73_m2]    Calcium 9.2 8.5 - 10.1 mg/dL   General PFT Lab (Please always keep checked)    Collection Time: 07/11/19 10:56 AM   Result Value Ref Range    FVC-Pred 2.71 L    FVC-Pre 1.66 L    FVC-%Pred-Pre 61 %    FEV1-Pre 1.30 L    FEV1-%Pred-Pre 63 %    FEV1FVC-Pred 77 %    FEV1FVC-Pre 78 %    FEFMax-Pred 5.12 L/sec    FEFMax-Pre 4.33 L/sec    FEFMax-%Pred-Pre 84 %    FEF2575-Pred 1.63 L/sec    FEF2575-Pre 1.13 L/sec    DNV4828-%Pred-Pre 69 %    ExpTime-Pre 5.69 sec    FIFMax-Pre 2.78 L/sec    VC-Pred 3.06 L    VC-Pre 1.84 L    VC-%Pred-Pre 60 %    IC-Pred 2.77 L    IC-Pre 1.68 L    IC-%Pred-Pre 60 %    ERV-Pred 0.29 L    ERV-Pre 0.16 L    ERV-%Pred-Pre 56 %    FEV1FEV6-Pred 78 %    FEV1FEV6-Pre 78 %    FRCPleth-Pred 2.81 L    FRCPleth-Pre 2.27 L    FRCPleth-%Pred-Pre 80 %    RVPleth-Pred 2.28 L    RVPleth-Pre 2.11 L    RVPleth-%Pred-Pre 92 %    TLCPleth-Pred 5.19 L    TLCPleth-Pre 3.95 L    TLCPleth-%Pred-Pre 76 %    DLCOunc-Pred 19.83 ml/min/mmHg    DLCOunc-Pre 16.29 ml/min/mmHg    DLCOunc-%Pred-Pre 82 %    VA-Pre 3.30 L    VA-%Pred-Pre 67 %    FEV1SVC-Pred 67 %    FEV1SVC-Pre 71 %       I reviewed pulmonary function test that was performed today.  This shows  moderate restriction with a normal diffusion.  However, she was not able to take his deep of breath and because of pain in her back and chest.  Lung function is stable.  In the past, she has had a non-specific spirometry pattern suggestive of obstructive lung disease.    I reviewed results with the patient.      Assessment and plan:   Sister Sita is a 79-year-old with Sjogren's follicular bronchiolitis and history of mild ILD who is here for follow-up.  1.  Right chest and right back pain.  There is point tenderness in both areas, which could be over the rib.  I will get rib x-rays today and also a thoracic spine x-ray to evaluate for rib fracture and/or compression fracture.  Her symptoms of pain for a week with point tenderness are inconsistent with a PE.  Also, there is no flank tenderness to indicate pyelonephritis.  Kidney stone would be an unlikely cause of her mid right chest and right back pain.  We will let her know the results of the x-rays.  2.  I will Sjogren's follicular bronchiolitis.  PFT is stable.  We will continue daily azithromycin and Flovent inhaler.  She will return in approximately 4 months with full PFT.  Her prednisone taper is managed by Dr. Stringer in rheumatology.  3.  Medication monitoring.  She will need an EKG in 3 months to monitor azithromycin.  4.  Hypoxia.  She will continue oxygen 4 L/min.  5.  History of Sjogren's ILD.  PFT is stable, diffusion is normal, and CT scan last year showed improvement in ILD with no further ILD present.    Addendum: Rib films show possible fracture of the right seventh lateral rib.  This could explain her pain.  Also has probable new compression fracture of T5.

## 2019-07-11 NOTE — LETTER
7/11/2019       RE: Betty Tee  [address]  [facility] [id number]     Dear Colleague,    Thank you for referring your patient, Betty Tee, to the [facility] at [facility]. Please see a copy of my visit note below.    [facility]    Reason for Visit  Betty Tee is a 79 year old year old female who is being seen for Interstitial Lung Disease (ILD) (Follow up on Betty and her Sjogrens)    HPI    Sister Sita is a 79-year-old who has Sjogren's follicular bronchiolitis and history of ILD who is here for follow-up.  She had mild ILD on a previous chest CT scan in 2015, but follow-up chest CT scan in 2018 showed improvement with no obvious ILD present on the CT scan.  The follicular bronchiolitis changes were still present.  She has been on prednisone for her Sjogren's for 3 years or so, and that is managed by Dr. Stringer in rheumatology.  Her current prednisone dose is 8 mg alternating with 6 mg.      Her main complaint today is sharp pain under her right breast and also in her right back.  This has been present for approximately 1 week.  She denies trauma to that area or any sudden movements or twisting.  The pain occurs when she takes a breath in or when she twists.  She denies any rash or blisters in that area.  She has never had kidney stones, however her serum creatinine had increased when she saw her primary care provider yesterday.  I started azithromycin in March of this year as an additional anti-inflammatory for her lungs, and she reports no side effects.  She continues to use Flovent inhaler.  She uses an albuterol inhaler as needed and only rarely.        Current Outpatient Medications   Medication     acetaminophen (TYLENOL) 500 MG tablet     acyclovir (ZOVIRAX) 400 MG tablet     acyclovir (ZOVIRAX) 5 % external ointment     albuterol (PROAIR HFA, PROVENTIL  HFA, VENTOLIN HFA) 108 (90 BASE) MCG/ACT inhaler     atorvastatin (LIPITOR) 10 MG tablet     azithromycin (ZITHROMAX) 250 MG tablet     BD JANE U/F 32G X 4 MM insulin pen needle     blood glucose (ACCU-CHEK SHANNON PLUS) test strip     blood glucose monitoring (ACCU-CHEK SHANNON PLUS) test strip     blood glucose monitoring (ACCU-CHEK FASTCLIX) lancets     blood glucose monitoring (ACCU-CHEK MULTICLIX) lancets     COMPRESSION STOCKINGS     empagliflozin (JARDIANCE) 10 MG TABS tablet     escitalopram (LEXAPRO) 20 MG tablet     fluticasone (FLONASE) 50 MCG/ACT nasal spray     fluticasone (FLOVENT HFA) 220 MCG/ACT inhaler     Humidifier MISC     hydroxychloroquine (PLAQUENIL) 200 MG tablet     insulin glargine (BASAGLAR KWIKPEN) 100 UNIT/ML pen     ketoconazole (NIZORAL) 2 % external cream     lidocaine (LIDODERM) 5 % patch     loratadine (CLARITIN) 10 MG tablet     metFORMIN (GLUCOPHAGE-XR) 500 MG 24 hr tablet     metoprolol succinate (TOPROL XL) 25 MG 24 hr tablet     metoprolol succinate (TOPROL-XL) 100 MG 24 hr tablet     montelukast (SINGULAIR) 10 MG tablet     NOVOLOG FLEXPEN 100 UNIT/ML soln     order for DME     order for DME     order for DME     order for DME     potassium chloride SA (K-DUR/KLOR-CON M) 20 MEQ CR tablet     predniSONE (DELTASONE) 5 MG tablet     rivaroxaban ANTICOAGULANT (XARELTO) 20 MG TABS tablet     spironolactone (ALDACTONE) 25 MG tablet     tiZANidine (ZANAFLEX) 2 MG tablet     torsemide (DEMADEX) 10 MG tablet     torsemide (DEMADEX) 20 MG tablet     traZODone (DESYREL) 50 MG tablet     No current facility-administered medications for this visit.      Allergies   Allergen Reactions     Amoxicillin-Pot Clavulanate      Augmentin Nausea and Vomiting     Codeine Nausea and Vomiting     Codeine      Phenobarbital Itching     Phenobarbital      Seasonal Allergies      Past Medical History:   Diagnosis Date     Alcohol abuse, in remission      Allergic rhinitis, cause unspecified     allegra helps  when she takes it     Antiplatelet or antithrombotic long-term use      Atrial fibrillation (H)     in hosp in 11/11 after surgery w/ fluid overload     Cardiomegaly     LVH on stress echo- cardiac w/u at [facility] ER- neg CT scan for PE, neg stress echo in 8/06     Chest pain, unspecified      Disorder of bone and cartilage, unspecified     osteopenia (had been on prempro), improved on 6/06 dexa, stable dexa 11/10     Diverticulosis of colon (without mention of hemorrhage)     last episode yrs ago     Essential hypertension, benign      Follicular bronchiolitis (H)     associated with Sjogrens, dx by chest CT showing mosaic attenuation and air trapping     Gastro-oesophageal reflux disease      ILD (interstitial lung disease) (H)     associated with Sjogrens, also has mildly elevated IgG4, first noted on chest CT 2015 (mild changes) and also has small airways disease; ILD improved on follow up chest CT 2018.     Insomnia, unspecified     weaned off clonazepam     Irregular heart beat      Lumbago 7/09    MRI with DJD, now seeing Dr. Cain for sciatic sx's     Major depressive disorder, recurrent episode, moderate (H)      Obstructive sleep apnea      Osteoarthrosis, unspecified whether generalized or localized, unspecified site      Sjogren's syndrome (H)     + RG and SSA and lip bx     Sleep apnea      Tobacco use disorder     chantix in 9/07, started again in 6/08, working       Past Surgical History:   Procedure Laterality Date     BACK SURGERY  1962     BIOPSY BREAST  9/27/02    Biopsy Left Breast     C APPENDECTOMY  1970's?     C NONSPECIFIC PROCEDURE  11/05    exploratory abd lap, adhesions, resolved RLQ pain, diverticulitis episodes     CARDIAC SURGERY       CHOLECYSTECTOMY  1990's?     COLECTOMY LEFT  11/7/2011    Procedure:COLECTOMY LEFT; Laparoscopic mobilization of splenic flexture, sigmoid colectomy, coloprotoscopy, loop illeostomy; Surgeon:CK CASTANEDA; Location:[facility] OR     HYSTERECTOMY TOTAL ABDOMINAL,  BILATERAL SALPINGO-OOPHORECTOMY, COMBINED  2011    Procedure:COMBINED HYSTERECTOMY TOTAL ABDOMINAL, BILATERAL SALPINGO-OOPHORECTOMY; total abdominal hysterectomy, bilateral salpingo-oophorectomy; Surgeon:ALETA MANUEL; Location:[facility] OR     INSERT STENT URETER  2011    Procedure:INSERT STENT URETER; Placement of Bilateral Ureteral Stents ; Surgeon:PRANEETH BRYANT; Location:[facility]     SIGMOIDOSCOPY FLEXIBLE  11/3/2011    Procedure:SIGMOIDOSCOPY FLEXIBLE; Flexible Sigmoidoscopy; Surgeon:CK CASTANEDA; Location:[facility]     TAKEDOWN ILEOSTOMY  2012    Procedure:TAKEDOWN ILEOSTOMY; Takedown Loop Ileostomy ; Surgeon:CK CASTANEDA; Location:[facility] OR       Social History     Socioeconomic History     Marital status: Single     Spouse name: Not on file     Number of children: 0     Years of education: Ed Spec De     Highest education level: Not on file   Occupational History     Occupation: Professor     Employer: [employer]     Comment: [employer]- Education   Social Needs     Financial resource strain: Not on file     Food insecurity:     Worry: Not on file     Inability: Not on file     Transportation needs:     Medical: Not on file     Non-medical: Not on file   Tobacco Use     Smoking status: Former Smoker     Packs/day: 0.50     Years: 10.00     Pack years: 5.00     Types: Cigarettes     Last attempt to quit: 2011     Years since quittin.9     Smokeless tobacco: Never Used     Tobacco comment:  ppd   Substance and Sexual Activity     Alcohol use: No     Alcohol/week: 0.0 oz     Comment: In recovery beginning      Drug use: No     Sexual activity: Never   Lifestyle     Physical activity:     Days per week: Not on file     Minutes per session: Not on file     Stress: Not on file   Relationships     Social connections:     Talks on phone: Not on file     Gets together: Not on file     Attends Judaism service: Not on file     Active member of club or  "organization: Not on file     Attends meetings of clubs or organizations: Not on file     Relationship status: Not on file     Intimate partner violence:     Fear of current or ex partner: Not on file     Emotionally abused: Not on file     Physically abused: Not on file     Forced sexual activity: Not on file   Other Topics Concern     Parent/sibling w/ CABG, MI or angioplasty before 65F 55M? Not Asked   Social History Narrative                   Family History   Problem Relation Age of Onset     C.A.D. Mother 63        MI- first at age 63     Heart Disease Mother      Hypertension Mother      Cerebrovascular Disease Mother      Hyperlipidemia Mother      Alcohol/Drug Father      Alzheimer Disease Father      Dementia Father      Hypertension Father      Hyperlipidemia Father      Diabetes Sister      C.A.D. Sister 52        Minor MI- age 50's     Heart Disease Sister      Hypertension Sister      Hypertension Sister      Hypertension Brother      Cancer - colorectal Sister 48        Late 40's early 50's     Prostate Cancer Brother 74        Dx'd age 74     Gastrointestinal Disease Sister         Diverticulitis     Gastrointestinal Disease Brother         Diverticulitis     Lipids Sister      Lipids Sister      Parkinsonism Brother      Diabetes Sister      Heart Disease Sister         CHF     Cancer Sister         lung, smoker     Substance Abuse Sister      Substance Abuse Brother      Asthma Sister      Cancer Sister      Breast Cancer Daughter      Prostate Cancer Brother      Hyperlipidemia Brother      Diabetes Other      Hypertension Other              ROS Pulmonary  A complete ROS was otherwise negative except as noted in the HPI.    Vitals: /80   Pulse 52   Resp 18   Ht 1.676 m (5' 6\")   Wt 95.3 kg (210 lb)   SpO2 97%   BMI 33.89 kg/m       Exam:   GENERAL APPEARANCE: Well developed, well nourished, alert, and in no apparent distress.  RESP: good air flow throughout.  No crackles. No rhonchi. " No wheezes.  No inspiratory squeaks.  CV: Normal S1, S2, regular rhythm, normal rate. No murmur.  No LE edema.   MS: extremities normal. No clubbing. No cyanosis.  There is point tenderness in the right upper quadrant underneath her right breast.  I cannot tell if it is over a rib.  There also is point tenderness in her right back which also could be over a rib.  There is no flank tenderness to palpation.  SKIN: no rash on limited exam.  NEURO: Mentation intact, speech normal, normal gait and stance.  PSYCH: mentation appears normal. and affect normal/bright.    Results:  Recent Results (from the past 168 hour(s))   Basic metabolic panel    Collection Time: 07/10/19 12:40 PM   Result Value Ref Range    Sodium 141 133 - 144 mmol/L    Potassium 3.8 3.4 - 5.3 mmol/L    Chloride 108 94 - 109 mmol/L    Carbon Dioxide 25 20 - 32 mmol/L    Anion Gap 8 3 - 14 mmol/L    Glucose 182 (H) 70 - 99 mg/dL    Urea Nitrogen 16 7 - 30 mg/dL    Creatinine 1.39 (H) 0.52 - 1.04 mg/dL    GFR Estimate 36 (L) >60 mL/min/[1.73_m2]    GFR Estimate If Black 42 (L) >60 mL/min/[1.73_m2]    Calcium 9.2 8.5 - 10.1 mg/dL   General PFT Lab (Please always keep checked)    Collection Time: 07/11/19 10:56 AM   Result Value Ref Range    FVC-Pred 2.71 L    FVC-Pre 1.66 L    FVC-%Pred-Pre 61 %    FEV1-Pre 1.30 L    FEV1-%Pred-Pre 63 %    FEV1FVC-Pred 77 %    FEV1FVC-Pre 78 %    FEFMax-Pred 5.12 L/sec    FEFMax-Pre 4.33 L/sec    FEFMax-%Pred-Pre 84 %    FEF2575-Pred 1.63 L/sec    FEF2575-Pre 1.13 L/sec    JUH0364-%Pred-Pre 69 %    ExpTime-Pre 5.69 sec    FIFMax-Pre 2.78 L/sec    VC-Pred 3.06 L    VC-Pre 1.84 L    VC-%Pred-Pre 60 %    IC-Pred 2.77 L    IC-Pre 1.68 L    IC-%Pred-Pre 60 %    ERV-Pred 0.29 L    ERV-Pre 0.16 L    ERV-%Pred-Pre 56 %    FEV1FEV6-Pred 78 %    FEV1FEV6-Pre 78 %    FRCPleth-Pred 2.81 L    FRCPleth-Pre 2.27 L    FRCPleth-%Pred-Pre 80 %    RVPleth-Pred 2.28 L    RVPleth-Pre 2.11 L    RVPleth-%Pred-Pre 92 %    TLCPleth-Pred 5.19 L     TLCPleth-Pre 3.95 L    TLCPleth-%Pred-Pre 76 %    DLCOunc-Pred 19.83 ml/min/mmHg    DLCOunc-Pre 16.29 ml/min/mmHg    DLCOunc-%Pred-Pre 82 %    VA-Pre 3.30 L    VA-%Pred-Pre 67 %    FEV1SVC-Pred 67 %    FEV1SVC-Pre 71 %       I reviewed pulmonary function test that was performed today.  This shows moderate restriction with a normal diffusion.  However, she was not able to take his deep of breath and because of pain in her back and chest.  Lung function is stable.  In the past, she has had a non-specific spirometry pattern suggestive of obstructive lung disease.    I reviewed results with the patient.      Assessment and plan:   Sister Sita is a 79-year-old with Sjogren's follicular bronchiolitis and history of mild ILD who is here for follow-up.  1.  Right chest and right back pain.  There is point tenderness in both areas, which could be over the rib.  I will get rib x-rays today and also a thoracic spine x-ray to evaluate for rib fracture and/or compression fracture.  Her symptoms of pain for a week with point tenderness are inconsistent with a PE.  Also, there is no flank tenderness to indicate pyelonephritis.  Kidney stone would be an unlikely cause of her mid right chest and right back pain.  We will let her know the results of the x-rays.  2.  I will Sjogren's follicular bronchiolitis.  PFT is stable.  We will continue daily azithromycin and Flovent inhaler.  She will return in approximately 4 months with full PFT.  Her prednisone taper is managed by Dr. Stringer in rheumatology.  3.  Medication monitoring.  She will need an EKG in 3 months to monitor azithromycin.  4.  Hypoxia.  She will continue oxygen 4 L/min.  5.  History of Sjogren's ILD.  PFT is stable, diffusion is normal, and CT scan last year showed improvement in ILD with no further ILD present.  Again, thank you for allowing me to participate in the care of your patient.      Sincerely,    Maryjane Tillman MD

## 2019-07-18 ENCOUNTER — OFFICE VISIT (OUTPATIENT)
Dept: URGENT CARE | Facility: URGENT CARE | Age: 79
End: 2019-07-18
Payer: MEDICARE

## 2019-07-18 ENCOUNTER — ANCILLARY PROCEDURE (OUTPATIENT)
Dept: GENERAL RADIOLOGY | Facility: CLINIC | Age: 79
End: 2019-07-18
Attending: PHYSICIAN ASSISTANT
Payer: MEDICARE

## 2019-07-18 VITALS
SYSTOLIC BLOOD PRESSURE: 115 MMHG | WEIGHT: 211 LBS | DIASTOLIC BLOOD PRESSURE: 67 MMHG | BODY MASS INDEX: 34.06 KG/M2 | OXYGEN SATURATION: 95 % | TEMPERATURE: 98.3 F | HEART RATE: 65 BPM

## 2019-07-18 DIAGNOSIS — R05.9 COUGH: Primary | ICD-10-CM

## 2019-07-18 PROCEDURE — 71046 X-RAY EXAM CHEST 2 VIEWS: CPT

## 2019-07-18 PROCEDURE — 99214 OFFICE O/P EST MOD 30 MIN: CPT | Performed by: PHYSICIAN ASSISTANT

## 2019-07-18 ASSESSMENT — ENCOUNTER SYMPTOMS
CHILLS: 0
FEVER: 0
WEAKNESS: 0
PALPITATIONS: 0
SHORTNESS OF BREATH: 0
MUSCULOSKELETAL NEGATIVE: 1
JOINT SWELLING: 0
VOMITING: 0
NECK PAIN: 0
BRUISES/BLEEDS EASILY: 0
LIGHT-HEADEDNESS: 0
BACK PAIN: 0
MYALGIAS: 0
HEADACHES: 0
ALLERGIC/IMMUNOLOGIC NEGATIVE: 1
DIZZINESS: 0
RHINORRHEA: 0
NECK STIFFNESS: 0
ARTHRALGIAS: 0
HEMATOLOGIC/LYMPHATIC NEGATIVE: 1
DIARRHEA: 0
CARDIOVASCULAR NEGATIVE: 1
NAUSEA: 0
EYES NEGATIVE: 1
SORE THROAT: 0
ENDOCRINE NEGATIVE: 1
COUGH: 1
WOUND: 0

## 2019-07-18 NOTE — PROGRESS NOTES
"Chief Complaint:     Chief Complaint   Patient presents with     Cough     Patient complains of fracture ribs and trouble breathing       HPI: Betty Tee is an 79 year old female who presents with cough, and rib fracture. She has been seen 2 times this week for this, and was advised by her pulmonologist to come in to get a CXR to rule out pneumonia due to shallow breathing.  She does have O2 at home.  She denies any dizziness, or worsening SOB.    Recent travel?  no.    Patient has a complicated medical Hx \"see problem list below.\"    ROS:     Review of Systems   Constitutional: Negative for chills and fever.   HENT: Positive for congestion. Negative for ear pain, rhinorrhea and sore throat.    Eyes: Negative.    Respiratory: Positive for cough. Negative for shortness of breath.    Cardiovascular: Negative.  Negative for chest pain and palpitations.   Gastrointestinal: Negative for diarrhea, nausea and vomiting.   Endocrine: Negative.    Genitourinary: Negative.    Musculoskeletal: Negative.  Negative for arthralgias, back pain, joint swelling, myalgias, neck pain and neck stiffness.   Skin: Negative.  Negative for rash and wound.   Allergic/Immunologic: Negative.  Negative for immunocompromised state.   Neurological: Negative for dizziness, weakness, light-headedness and headaches.   Hematological: Negative.  Does not bruise/bleed easily.        Respiratory History  occasional episodes of bronchitis, interstitial lung disease, follicular bronchiolitis and pneumonia       Family History   Family History   Problem Relation Age of Onset     C.A.D. Mother 63        MI- first at age 63     Heart Disease Mother      Hypertension Mother      Cerebrovascular Disease Mother      Hyperlipidemia Mother      Alcohol/Drug Father      Alzheimer Disease Father      Dementia Father      Hypertension Father      Hyperlipidemia Father      Diabetes Sister      C.A.D. Sister 52        Minor MI- age 50's     Heart Disease " Sister      Hypertension Sister      Hypertension Sister      Hypertension Brother      Cancer - colorectal Sister 48        Late 40's early 50's     Prostate Cancer Brother 74        Dx'd age 74     Gastrointestinal Disease Sister         Diverticulitis     Gastrointestinal Disease Brother         Diverticulitis     Lipids Sister      Lipids Sister      Parkinsonism Brother      Diabetes Sister      Heart Disease Sister         CHF     Cancer Sister         lung, smoker     Substance Abuse Sister      Substance Abuse Brother      Asthma Sister      Cancer Sister      Breast Cancer Daughter      Prostate Cancer Brother      Hyperlipidemia Brother      Diabetes Other      Hypertension Other         Problem history  Patient Active Problem List   Diagnosis     Temporomandibular joint disorder     Diverticulitis of colon     Disorder of bone and cartilage     Osteoarthritis     Alcohol abuse, in remission     Restless legs syndrome (RLS)     Major depressive disorder, recurrent episode, moderate (H)     Essential hypertension with goal blood pressure less than 140/90     Advanced directives, counseling/discussion     Colouterine fistula     Permanent atrial fibrillation (H)     Left ventricular hypertrophy     Health Care Home     Insomnia     Joint pains     Allergic reaction caused by a drug - likely plaquenil     Breast fibroadenoma     DVT (deep venous thrombosis) (H)     Fatty liver disease, nonalcoholic     Rib pain     Neck mass     Gastroesophageal reflux disease without esophagitis     Enlarged lymph node     Sjogren's syndrome with lung involvement (H)     ANGELICA (obstructive sleep apnea)     ILD (interstitial lung disease) (H)     Lower GI bleed     Acute and chronic respiratory failure with hypoxia (H)     (HFpEF) heart failure with preserved ejection fraction (H)     Type 2 diabetes mellitus with hyperglycemia, with long-term current use of insulin (H)     Heart failure, chronic, with acute decompensation (H)      Hyperlipidemia LDL goal <100     Long term current use of anticoagulant therapy     Inflammatory arthritis     Follicular bronchiolitis (H)        Allergies  Allergies   Allergen Reactions     Amoxicillin-Pot Clavulanate      Augmentin Nausea and Vomiting     Codeine Nausea and Vomiting     Codeine      PN: LW Reaction: HIVES     Penicillins Nausea and Vomiting     PN: LW Reaction: GI Upset     Phenobarbital Itching     Phenobarbital      Seasonal Allergies         Social History  Social History     Socioeconomic History     Marital status: Single     Spouse name: Not on file     Number of children: 0     Years of education: Ed Spec De     Highest education level: Not on file   Occupational History     Occupation: Professor     Employer: [employer]     Comment: [employer]- Education   Social Needs     Financial resource strain: Not on file     Food insecurity:     Worry: Not on file     Inability: Not on file     Transportation needs:     Medical: Not on file     Non-medical: Not on file   Tobacco Use     Smoking status: Former Smoker     Packs/day: 0.50     Years: 10.00     Pack years: 5.00     Types: Cigarettes     Last attempt to quit: 2011     Years since quittin.9     Smokeless tobacco: Never Used     Tobacco comment:  ppd   Substance and Sexual Activity     Alcohol use: No     Alcohol/week: 0.0 oz     Comment: In recovery beginning      Drug use: No     Sexual activity: Never   Lifestyle     Physical activity:     Days per week: Not on file     Minutes per session: Not on file     Stress: Not on file   Relationships     Social connections:     Talks on phone: Not on file     Gets together: Not on file     Attends Shinto service: Not on file     Active member of club or organization: Not on file     Attends meetings of clubs or organizations: Not on file     Relationship status: Not on file     Intimate partner violence:     Fear of current or ex  partner: Not on file     Emotionally abused: Not on file     Physically abused: Not on file     Forced sexual activity: Not on file   Other Topics Concern     Parent/sibling w/ CABG, MI or angioplasty before 65F 55M? Not Asked   Social History Narrative                    Current Meds    Current Outpatient Medications:      acetaminophen (TYLENOL) 500 MG tablet, Take 500 mg by mouth nightly as needed , Disp: , Rfl:      acyclovir (ZOVIRAX) 400 MG tablet, Take 1 tablet (400 mg) by mouth 3 times daily For a couple days, Disp: 15 tablet, Rfl: 2     acyclovir (ZOVIRAX) 5 % external ointment, Apply topically 6 times daily As needed for outbreaks, Disp: 15 g, Rfl: 3     albuterol (PROAIR HFA, PROVENTIL HFA, VENTOLIN HFA) 108 (90 BASE) MCG/ACT inhaler, Inhale 2 puffs into the lungs every 6 hours, Disp: 1 Inhaler, Rfl: 3     atorvastatin (LIPITOR) 10 MG tablet, TAKE 1 TABLET BY MOUTH EVERY DAY, Disp: 90 tablet, Rfl: 0     azithromycin (ZITHROMAX) 250 MG tablet, Take 1 tablet (250 mg) by mouth daily, Disp: 30 tablet, Rfl: 11     BD JANE U/F 32G X 4 MM insulin pen needle, USE 4 DAILY AS DIRECTED., Disp: 400 each, Rfl: 1     blood glucose (ACCU-CHEK SHANNON PLUS) test strip, USE TO TEST BLOOD SUGARS 3 TIMES DAILY, Disp: 400 strip, Rfl: 3     blood glucose monitoring (ACCU-CHEK SHANNON PLUS) test strip, Use to test blood sugar 4 times daily or as directed.  Ok to substitute alternative if insurance prefers., Disp: 120 strip, Rfl: 11     blood glucose monitoring (ACCU-CHEK FASTCLIX) lancets, Use to test blood sugar 4 times daily or as directed.  Ok to substitute alternative if insurance prefers., Disp: 1 Box, Rfl: 11     blood glucose monitoring (ACCU-CHEK MULTICLIX) lancets, Use to test blood sugar 4 times daily or as directed., Disp: 4 Box, Rfl: 3     COMPRESSION STOCKINGS, Wear compression stockings in affected leg (right leg) or both legs most of the time during the day and take them off at night., Disp: 2 each, Rfl: 2      escitalopram (LEXAPRO) 20 MG tablet, TAKE 1 TABLET BY MOUTH EVERY DAY, Disp: 90 tablet, Rfl: 0     fluticasone (FLONASE) 50 MCG/ACT nasal spray, Spray 1-2 sprays into both nostrils daily as needed for allergies, Disp: 1 Bottle, Rfl: 3     fluticasone (FLOVENT HFA) 220 MCG/ACT inhaler, Inhale 2 puffs into the lungs 2 times daily, Disp: 3 Inhaler, Rfl: 3     Humidifier MISC, Continuous humidified air via concentrator.  Diagnosis: ILD Duration: Lifetime 99., Disp: 1 each, Rfl: 1     hydroxychloroquine (PLAQUENIL) 200 MG tablet, Take 2 tablets (400 mg) by mouth daily Annual Plaquenil toxicity eye screening required., Disp: 180 tablet, Rfl: 1     insulin glargine (BASAGLAR KWIKPEN) 100 UNIT/ML pen, INJECT 25 UNITS SUBCUTANEOUS DAILY (TO REPLACE LANTUS), Disp: 15 mL, Rfl: 1     ketoconazole (NIZORAL) 2 % external cream, Apply topically 2 times daily, Disp: 60 g, Rfl: 1     lidocaine (LIDODERM) 5 % patch, Apply patch to painful area for up to 12 h within a 24 h period.  Remove after 12 hours., Disp: 30 patch, Rfl: 5     loratadine (CLARITIN) 10 MG tablet, Take 1 tablet (10 mg) by mouth daily, Disp: 90 tablet, Rfl: 1     metFORMIN (GLUCOPHAGE-XR) 500 MG 24 hr tablet, TAKE 2 TABLETS BY MOUTH DAILY WITH DINNER, Disp: 180 tablet, Rfl: 0     metoprolol succinate (TOPROL XL) 25 MG 24 hr tablet, Take 0.5 tablets (12.5 mg) by mouth 2 times daily Take 50 mg by mouth in combination with 12.5 for a total of 62.5 twice a day, Disp: 90 tablet, Rfl: 3     metoprolol succinate (TOPROL-XL) 100 MG 24 hr tablet, Take 50 mg by mouth in combination with 12.5 for a total of 62.5 twice a day, Disp: 90 tablet, Rfl: 3     montelukast (SINGULAIR) 10 MG tablet, TAKE 1 TABLET BY MOUTH EVERYDAY AT BEDTIME, Disp: 90 tablet, Rfl: 0     NOVOLOG FLEXPEN 100 UNIT/ML soln, INJECT 12 UNITS SUBCUTANEOUS 3 TIMES DAILY (WITH MEALS), Disp: 15 mL, Rfl: 2     order for DME, Please provide patient with a POC.  Okay to test patient on POC to maintain oxygen  saturations above 90%.  Patient currently uses 2 to 3 LPM oxygen with activity., Disp: 1 Device, Rfl: 0     order for DME, Equipment being ordered: Oxygen Pulsed oxygen portable tank Rate: 4LNC Diagnosis: ILD Duration: Lifetime -99, Disp: 1 Device, Rfl: 1     order for DME, Equipment being ordered: Full face mask for CPAP - size: F10 large, Disp: 1 each, Rfl: 0     order for DME, Oxygen: Patient requires supplemental Oxygen 4 LPM via nasal canula with activity. Please provide patient with a home unit and with portability capability. Oxygen will be for a lifetime., Disp: 1 Device, Rfl: 0     potassium chloride SA (K-DUR/KLOR-CON M) 20 MEQ CR tablet, Take 2 tabs (40 meq) in am and 1 tab (20 meq) in pm daily, Disp: 270 tablet, Rfl: 3     predniSONE (DELTASONE) 5 MG tablet, Starting 5/13/19 alternate between 6 and 8mg once a day, Disp: 90 tablet, Rfl: 0     rivaroxaban ANTICOAGULANT (XARELTO) 20 MG TABS tablet, Take 1 tablet (20 mg) by mouth daily (with dinner), Disp: 90 tablet, Rfl: 3     Semaglutide (OZEMPIC) 0.25 or 0.5 MG/DOSE SOPN, Inject 0.25 mg Subcutaneous once a week for 28 days, THEN 0.5 mg once a week for 28 days., Disp: 1.5 mL, Rfl: 0     spironolactone (ALDACTONE) 25 MG tablet, Take 2 tablets (50 mg) by mouth daily, Disp: 180 tablet, Rfl: 3     tiZANidine (ZANAFLEX) 2 MG tablet, Take 1-2 tablets (2-4 mg) by mouth 3 times daily, Disp: 90 tablet, Rfl: 1     torsemide (DEMADEX) 10 MG tablet, Take one tab (10 mg) with one 20 mg tab to = 30 mg daily in afternoon., Disp: 90 tablet, Rfl: 3     torsemide (DEMADEX) 20 MG tablet, Take 2 tabs (40 mg) in am daily, Disp: 105 tablet, Rfl: 10     traZODone (DESYREL) 50 MG tablet, Take 0.5-1 tablets (25-50 mg) by mouth nightly as needed for sleep, Disp: 30 tablet, Rfl: 5        OBJECTIVE     Vital signs reviewed by Kelechi An  /67 (BP Location: Left arm, Patient Position: Chair, Cuff Size: Adult Regular)   Pulse 65   Temp 98.3  F (36.8  C) (Oral)   Wt 95.7 kg  (211 lb)   SpO2 95%   BMI 34.06 kg/m       Physical Exam   Constitutional: She is oriented to person, place, and time. She appears well-developed and well-nourished. She is cooperative.  Non-toxic appearance. She does not have a sickly appearance. She does not appear ill. No distress.   HENT:   Head: Normocephalic and atraumatic.   Right Ear: Hearing, tympanic membrane, external ear and ear canal normal. Tympanic membrane is not perforated, not erythematous, not retracted and not bulging.   Left Ear: Hearing, tympanic membrane, external ear and ear canal normal. Tympanic membrane is not perforated, not erythematous, not retracted and not bulging.   Nose: No mucosal edema or rhinorrhea. Right sinus exhibits no maxillary sinus tenderness and no frontal sinus tenderness. Left sinus exhibits no maxillary sinus tenderness and no frontal sinus tenderness.   Mouth/Throat: Mucous membranes are normal. No oropharyngeal exudate, posterior oropharyngeal edema, posterior oropharyngeal erythema or tonsillar abscesses. Tonsils are 0 on the right. Tonsils are 0 on the left. No tonsillar exudate.   Eyes: Pupils are equal, round, and reactive to light. EOM are normal. Right eye exhibits no discharge. Left eye exhibits no discharge.   Neck: Normal range of motion. Neck supple.   Cardiovascular: Normal rate, regular rhythm, normal heart sounds and intact distal pulses. Exam reveals no gallop and no friction rub.   No murmur heard.  Pulmonary/Chest: Effort normal and breath sounds normal. No respiratory distress. She has no decreased breath sounds. She has no wheezes. She has no rhonchi. She has no rales. She exhibits no tenderness.   Abdominal: Soft. Bowel sounds are normal. She exhibits no distension and no mass. There is no tenderness. There is no guarding.   Lymphadenopathy:     She has no cervical adenopathy.   Neurological: She is alert and oriented to person, place, and time. She has normal reflexes. No cranial nerve deficit.    Skin: Skin is warm and dry. She is not diaphoretic.   Psychiatric: She has a normal mood and affect. Her behavior is normal. Judgment and thought content normal.   Nursing note and vitals reviewed.        Labs:     Results for orders placed or performed in visit on 07/18/19   XR Chest 2 Views    Narrative    CHEST TWO VIEWS  7/18/2019 2:10 PM     HISTORY: Worsening cough.    COMPARISON: 7/11/2019 and 6/15/2018 chest x-ray      Impression    IMPRESSION: Mild increased interstitial markings in the lower lungs  without significant change since 7/11/2019 or 6/15/2018. Biapical  thickening consistent with apical scarring is stable. Mildly enlarged  cardiac silhouette. Normal caliber pulmonary vessels. No evidence for  pleural effusion or acute appearing infiltrate.    MARILU HUGGINS MD     *Note: Due to a large number of results and/or encounters for the requested time period, some results have not been displayed. A complete set of results can be found in Results Review.       Medical Decision Making:    Differential Diagnosis:  URI Adult/Peds:  Bronchitis-viral, Pneumonia and Viral upper respiratory illness        ASSESSMENT    1. Cough        PLAN  Patient has a complicated pulmonary Hx.  Patient is in no acute distress.  Temp is 98.3 in clinic today. Lung sounds were clear and O2 sats at 95% on RA.   She does have some discomfort with coughing due to rib fracture.  Imaging to rule out pneumonia ordered and was negative for acute infiltrates or consolidations per my read.  Patient instructed to start using her incentive spirometry.  She has several of these at her home.  Rest, Push fluids, vaporizer, elevation of head of bed.  Ibuprofen and or Tylenol for any fever or body aches.  If symptoms worsen, recheck immediately otherwise follow up with your PCP in 3 days if symptoms are not improving.  Worrisome symptoms discussed with instructions to go to the ED.  Patient verbalized understanding and agreed with this  plan.         Kelechi An  7/18/2019, 1:38 PM

## 2019-07-22 DIAGNOSIS — M35.02 SJOGREN'S SYNDROME WITH LUNG INVOLVEMENT (H): ICD-10-CM

## 2019-07-22 DIAGNOSIS — J84.9 ILD (INTERSTITIAL LUNG DISEASE) (H): ICD-10-CM

## 2019-07-22 LAB
DLCOUNC-%PRED-PRE: 82 %
DLCOUNC-PRE: 16.29 ML/MIN/MMHG
DLCOUNC-PRED: 19.83 ML/MIN/MMHG
ERV-%PRED-PRE: 56 %
ERV-PRE: 0.16 L
ERV-PRED: 0.29 L
EXPTIME-PRE: 5.69 SEC
FEF2575-%PRED-PRE: 69 %
FEF2575-PRE: 1.13 L/SEC
FEF2575-PRED: 1.63 L/SEC
FEFMAX-%PRED-PRE: 84 %
FEFMAX-PRE: 4.33 L/SEC
FEFMAX-PRED: 5.12 L/SEC
FEV1-%PRED-PRE: 63 %
FEV1-PRE: 1.3 L
FEV1FEV6-PRE: 78 %
FEV1FEV6-PRED: 78 %
FEV1FVC-PRE: 78 %
FEV1FVC-PRED: 77 %
FEV1SVC-PRE: 71 %
FEV1SVC-PRED: 67 %
FIFMAX-PRE: 2.78 L/SEC
FRCPLETH-%PRED-PRE: 80 %
FRCPLETH-PRE: 2.27 L
FRCPLETH-PRED: 2.81 L
FVC-%PRED-PRE: 61 %
FVC-PRE: 1.66 L
FVC-PRED: 2.71 L
IC-%PRED-PRE: 60 %
IC-PRE: 1.68 L
IC-PRED: 2.77 L
RVPLETH-%PRED-PRE: 92 %
RVPLETH-PRE: 2.11 L
RVPLETH-PRED: 2.28 L
TLCPLETH-%PRED-PRE: 76 %
TLCPLETH-PRE: 3.95 L
TLCPLETH-PRED: 5.19 L
VA-%PRED-PRE: 67 %
VA-PRE: 3.3 L
VC-%PRED-PRE: 60 %
VC-PRE: 1.84 L
VC-PRED: 3.06 L

## 2019-07-22 NOTE — TELEPHONE ENCOUNTER
"Singulair 10MG   Last Written Prescription Date:  04/17/2019  Last Fill Quantity: 90,  # refills: 0   Last office visit: 7/10/2019 with prescribing provider:  Yes    Future Office Visit:   Next 5 appointments (look out 90 days)    Sep 03, 2019  1:00 PM CDT  Office Visit with Ilene Tristan MD  [facility] ([facility]) [address]  [facility] [address]  [phone]   Sep 03, 2019  2:00 PM CDT  Office Visit with Sabrina Meek [facility] ([facility]) [address]  [address]  [facility] [address] [phone]         Requested Prescriptions   Pending Prescriptions Disp Refills     montelukast (SINGULAIR) 10 MG tablet [Pharmacy Med Name: MONTELUKAST SOD 10 MG TABLET] 90 tablet 0     Sig: TAKE 1 TABLET BY MOUTH EVERYDAY AT BEDTIME       Leukotriene Inhibitors Protocol Passed - 7/22/2019  1:58 AM        Passed - Patient is age 12 or older     If patient is under 16, ok to refill using age based dosing.           Passed - Recent (12 mo) or future (30 days) visit within the authorizing provider's specialty     Patient had office visit in the last 12 months or has a visit in the next 30 days with authorizing provider or within the authorizing provider's specialty.  See \"Patient Info\" tab in inbasket, or \"Choose Columns\" in Meds & Orders section of the refill encounter.              Passed - Medication is active on med list          "

## 2019-07-23 ENCOUNTER — TELEPHONE (OUTPATIENT)
Dept: PULMONOLOGY | Facility: CLINIC | Age: 79
End: 2019-07-23

## 2019-07-23 RX ORDER — MONTELUKAST SODIUM 10 MG/1
TABLET ORAL
Qty: 90 TABLET | Refills: 0 | Status: SHIPPED | OUTPATIENT
Start: 2019-07-23 | End: 2019-10-21

## 2019-07-23 NOTE — TELEPHONE ENCOUNTER
Prescription approved per [facility] Refill Protocol.  Sophia Hemphill RN

## 2019-07-23 NOTE — TELEPHONE ENCOUNTER
----- Message from Maryjane Tillman MD sent at 7/12/2019  1:24 PM CDT -----  Regarding: rib fracture  Rib films show possible fracture of the right seventh lateral rib.  This could explain her pain.  Also has probable new compression fracture of T5.  Not sure why she would have a rib fx.  She should talk with her primary about that.    HK

## 2019-07-24 ENCOUNTER — TELEPHONE (OUTPATIENT)
Dept: FAMILY MEDICINE | Facility: CLINIC | Age: 79
End: 2019-07-24

## 2019-07-24 NOTE — TELEPHONE ENCOUNTER
Sabrina -   Pt aware you are out for the week.  Please see below.  Spoke with pt and she just started Ozempic today 7/24/19, reviewed your notes from 7/11 appt with pt and instructed her to start taking 8 units of Novolog with meals.    She would like a phone call from you when you return for further instruction.    Thank you,  Kerline Leo, RN

## 2019-07-24 NOTE — TELEPHONE ENCOUNTER
Reason for Call:  Other prescription    Detailed comments: Pt started taking omeprazole on 7/23/19.  She was told that Sabrina would call her with a new dose of her insulin.  She never received a call.  She has not taken her insulin yet and she is concerned about what dose she needs to take.    Phone Number Patient can be reached at: Home number on file [phone] (home)    Best Time: any    Can we leave a detailed message on this number? YES    Call taken on 7/24/2019 at 10:02 AM by Paige Martinez

## 2019-07-29 DIAGNOSIS — I50.30 (HFPEF) HEART FAILURE WITH PRESERVED EJECTION FRACTION (H): Primary | ICD-10-CM

## 2019-07-29 DIAGNOSIS — E11.9 TYPE 2 DIABETES MELLITUS WITHOUT COMPLICATION, WITHOUT LONG-TERM CURRENT USE OF INSULIN (H): ICD-10-CM

## 2019-07-29 NOTE — TELEPHONE ENCOUNTER
Called pt - started Ozempic last Monday - had some nausea on Tues/Weds but subsided by end of week. Took second dose today - going OK so far. She dropped insulin down to 8 units TID, no high or low sugars - mornings averaging in 120's. BP scheduled for 8/8. Will f/u with pt later this week to keep close watch on her nausea.     Sabrina Meek, PharmD, JAMES, BCACP  [facility] Pharmacist, [facility]

## 2019-07-30 RX ORDER — EMPAGLIFLOZIN 10 MG/1
TABLET, FILM COATED ORAL
Start: 2019-07-30

## 2019-07-30 NOTE — TELEPHONE ENCOUNTER
Denied  Jardiance d/c'd 7/11/2019 - Ozempic started instead  Lydia HALEY RN    Last Written Prescription Date:    Last Fill Quantity: ,  # refills:    Last office visit: 7/11/2019 with prescribing provider:     Future Office Visit:   Next 5 appointments (look out 90 days)    Sep 03, 2019  1:00 PM CDT  Office Visit with Ilene Tristan MD  [facility] ([facility]) [address]  [facility] [address]  [phone]   Sep 03, 2019  2:00 PM CDT  Office Visit with Sabrina Meek [facility] ([facility]) [address]  [address]  [facility] [address]  [phone]         Requested Prescriptions   Pending Prescriptions Disp Refills     JARDIANCE 10 MG TABS tablet [Pharmacy Med Name: JARDIANCE 10 MG TABLET] 30 tablet 0     Sig: TAKE 1 TABLET BY MOUTH EVERY DAY       Sodium Glucose Co-Transport Inhibitor Agents Failed - 7/30/2019  9:42 AM        Failed - Medication is active on med list        Passed - Blood pressure less than 140/90 in past 6 months     BP Readings from Last 3 Encounters:   07/18/19 115/67   07/11/19 130/80   07/10/19 110/56                 Passed - Patient has documented LDL within the past 12 mos.     Recent Labs   Lab Test 04/09/19  1017   LDL 29             Passed - Patient has had a Microalbumin in the past 15 mos.     Recent Labs   Lab Test 04/09/19  1017   MICROL 28   UMALCR 50.98*             Passed - Patient has documented A1c within the specified period of time.     If HgbA1C is 8 or greater, it needs to be on file within the past 3 months.  If less than 8, must be on file within the past 6 months.     Recent Labs   Lab Test 04/09/19  1017   A1C 7.2*             Passed - No creatinine >1.4 or GFR <45 within the past 12 mos     Recent Labs   Lab Test 07/10/19  1240   GFRESTIMATED 36*   GFRESTBLACK 42*       Recent Labs   Lab Test 07/10/19  1240   CR 1.39*             Passed - Patient is age 18 or  "older        Passed - Patient is not pregnant        Passed - Patient has documented normal Potassium within the last 12 mos.     Recent Labs   Lab Test 07/10/19  1240   POTASSIUM 3.8             Passed - Patient has no positive pregnancy test within the past 12 mos.        Passed - Recent (6 mo) or future (30 days) visit within the authorizing provider's specialty     Patient had office visit in the last 6 months or has a visit in the next 30 days with authorizing provider or within the authorizing provider's specialty.  See \"Patient Info\" tab in inbasket, or \"Choose Columns\" in Meds & Orders section of the refill encounter.              "

## 2019-08-01 DIAGNOSIS — E11.69 TYPE 2 DIABETES MELLITUS WITH OTHER SPECIFIED COMPLICATION (H): ICD-10-CM

## 2019-08-02 ENCOUNTER — TELEPHONE (OUTPATIENT)
Dept: PHARMACY | Facility: CLINIC | Age: 79
End: 2019-08-02

## 2019-08-02 RX ORDER — INSULIN ASPART 100 [IU]/ML
INJECTION, SOLUTION INTRAVENOUS; SUBCUTANEOUS
Qty: 15 ML | Refills: 1 | Status: SHIPPED | OUTPATIENT
Start: 2019-08-02 | End: 2019-11-20

## 2019-08-02 NOTE — TELEPHONE ENCOUNTER
Called to check on nausea with Ozempic - no answer, message left.   Sabrina Meek, MarcosD, JAMES, BCACP  [facility] Pharmacist, [facility]

## 2019-08-02 NOTE — TELEPHONE ENCOUNTER
Prescription approved per [facility] Refill Protocol.  Lydia HALEY RN    Last Written Prescription Date:  4/29/2019  Last Fill Quantity: 15,  # refills: 2   Last office visit: 7/10/2019 with prescribing provider:     Future Office Visit:   Next 5 appointments (look out 90 days)    Sep 03, 2019  1:00 PM CDT  Office Visit with Ilene Tristan MD  [facility] ([facility]) [address] [place]  [facility] [address]  [phone]   Sep 03, 2019  2:00 PM CDT  Office Visit with Sabrina Meek [facility] ([facility]) [address]  [address]  [facility] [id number] [phone]         Requested Prescriptions   Pending Prescriptions Disp Refills     NOVOLOG FLEXPEN 100 UNIT/ML soln [Pharmacy Med Name: NOVOLOG 100 UNIT/ML FLEXPEN]  0     Sig: INJECT 12 UNITS SUBCUTANEOUS 3 TIMES DAILY (WITH MEALS)       Short Acting Insulin Protocol Passed - 8/1/2019  4:23 PM        Passed - Blood pressure less than 140/90 in past 6 months     BP Readings from Last 3 Encounters:   07/18/19 115/67   07/11/19 130/80   07/10/19 110/56                 Passed - LDL on file in past 12 months     Recent Labs   Lab Test 04/09/19  1017   LDL 29             Passed - Microalbumin on file in past 12 months     Recent Labs   Lab Test 04/09/19  1017   MICROL 28   UMALCR 50.98*             Passed - Serum creatinine on file in past 12 months     Recent Labs   Lab Test 07/10/19  1240  04/02/17  2213   CR 1.39*   < >  --    CREAT  --   --  0.6    < > = values in this interval not displayed.             Passed - HgbA1C in past 3 or 6 months     If HgbA1C is 8 or greater, it needs to be on file within the past 3 months.  If less than 8, must be on file within the past 6 months.     Recent Labs   Lab Test 04/09/19  1017   A1C 7.2*             Passed - Medication is active on med list        Passed - Patient is age 18 or older        Passed - Recent (6 mo) or future (30 days)  "visit within the authorizing provider's specialty     Patient had office visit in the last 6 months or has a visit in the next 30 days with authorizing provider or within the authorizing provider's specialty.  See \"Patient Info\" tab in inbasket, or \"Choose Columns\" in Meds & Orders section of the refill encounter.              "

## 2019-08-04 ENCOUNTER — HOSPITAL ENCOUNTER (EMERGENCY)
Facility: CLINIC | Age: 79
Discharge: HOME | End: 2019-08-04
Attending: FAMILY MEDICINE | Admitting: FAMILY MEDICINE
Payer: MEDICARE

## 2019-08-04 ENCOUNTER — APPOINTMENT (OUTPATIENT)
Dept: GENERAL RADIOLOGY | Facility: CLINIC | Age: 79
End: 2019-08-04
Attending: FAMILY MEDICINE
Payer: MEDICARE

## 2019-08-04 VITALS
DIASTOLIC BLOOD PRESSURE: 75 MMHG | OXYGEN SATURATION: 96 % | HEART RATE: 65 BPM | TEMPERATURE: 96.7 F | SYSTOLIC BLOOD PRESSURE: 138 MMHG | RESPIRATION RATE: 18 BRPM

## 2019-08-04 DIAGNOSIS — S63.501A SPRAIN OF RIGHT WRIST, INITIAL ENCOUNTER: ICD-10-CM

## 2019-08-04 PROCEDURE — 99284 EMERGENCY DEPT VISIT MOD MDM: CPT | Mod: 25 | Performed by: FAMILY MEDICINE

## 2019-08-04 PROCEDURE — 99283 EMERGENCY DEPT VISIT LOW MDM: CPT | Mod: Z6 | Performed by: FAMILY MEDICINE

## 2019-08-04 PROCEDURE — 29125 APPL SHORT ARM SPLINT STATIC: CPT | Mod: RT | Performed by: FAMILY MEDICINE

## 2019-08-04 PROCEDURE — 73110 X-RAY EXAM OF WRIST: CPT | Mod: RT

## 2019-08-04 PROCEDURE — 99283 EMERGENCY DEPT VISIT LOW MDM: CPT | Performed by: FAMILY MEDICINE

## 2019-08-04 ASSESSMENT — ENCOUNTER SYMPTOMS
HEADACHES: 0
ARTHRALGIAS: 1

## 2019-08-04 NOTE — ED AVS SNAPSHOT
[facility], [place], Emergency Department  [address]  [facility] [place] [address]  Phone:  [phone]  Fax:  [phone]                                    Betty Tee   MRN: [id number]    Department:  [facility], Emergency Department   Date of Visit:  8/4/2019           After Visit Summary Signature Page    I have received my discharge instructions, and my questions have been answered. I have discussed any challenges I see with this plan with the nurse or doctor.    ..........................................................................................................................................  Patient/Patient Representative Signature      ..........................................................................................................................................  Patient Representative Print Name and Relationship to Patient    ..................................................               ................................................  Date                                   Time    ..........................................................................................................................................  Reviewed by Signature/Title    ...................................................              ..............................................  Date                                               Time          22EPIC Rev 08/18

## 2019-08-04 NOTE — ED PROVIDER NOTES
"  History     Chief Complaint   Patient presents with     Fall     Onset Friday, fell when getting up from chair, landed on right hand and wrist, continues to be painful.     The history is provided by the patient.   Trauma  Mechanism of injury: fall     Current symptoms:       Associated symptoms:             Denies headache.     Betty Tee is a 79 year old female with a history of osteoarthritis, HTN, afib, DVT, and Sjogren's syndrome who presents to the ED with complaint of right wrist pain onset last night after a mechanical fall. Last night she was at a theater and fell up the stairs. She notes she landed \"funny\" on her wrist and is unsure what position she fell because she fell fast. She denies any head trauma. She denies any other pain. She has no deformity to the wrist, and has a little swelling in the dorsal aspect. She reports pain with movement.     I have reviewed the Medications, Allergies, Past Medical and Surgical History, and Social History in the [employer] system.  Past Medical History:   Diagnosis Date     Alcohol abuse, in remission      Allergic rhinitis, cause unspecified     allegra helps when she takes it     Antiplatelet or antithrombotic long-term use      Atrial fibrillation (H)     in hosp in 11/11 after surgery w/ fluid overload     Cardiomegaly     LVH on stress echo- cardiac w/u at [facility] ER- neg CT scan for PE, neg stress echo in 8/06     Chest pain, unspecified      Disorder of bone and cartilage, unspecified     osteopenia (had been on prempro), improved on 6/06 dexa, stable dexa 11/10     Diverticulosis of colon (without mention of hemorrhage)     last episode yrs ago     Essential hypertension, benign      Follicular bronchiolitis (H)     associated with Sjogrens, dx by chest CT showing mosaic attenuation and air trapping     Gastro-oesophageal reflux disease      ILD (interstitial lung disease) (H)     associated with Sjogrens, also has mildly elevated IgG4, first noted on chest " CT 2015 (mild changes) and also has small airways disease; ILD improved on follow up chest CT 2018.     Insomnia, unspecified     weaned off clonazepam     Irregular heart beat      Lumbago 7/09    MRI with NEAL, now seeing Dr. Cain for sciatic sx's     Major depressive disorder, recurrent episode, moderate (H)      Obstructive sleep apnea      Osteoarthrosis, unspecified whether generalized or localized, unspecified site      Sjogren's syndrome (H)     + RG and SSA and lip bx     Sleep apnea      Tobacco use disorder     chantix in 9/07, started again in 6/08, working       Past Surgical History:   Procedure Laterality Date     BACK SURGERY  1962     BIOPSY BREAST  9/27/02    Biopsy Left Breast     C APPENDECTOMY  1970's?     C NONSPECIFIC PROCEDURE  11/05    exploratory abd lap, adhesions, resolved RLQ pain, diverticulitis episodes     CARDIAC SURGERY       CHOLECYSTECTOMY  1990's?     COLECTOMY LEFT  11/7/2011    Procedure:COLECTOMY LEFT; Laparoscopic mobilization of splenic flexture, sigmoid colectomy, coloprotoscopy, loop illeostomy; Surgeon:CK CASTANEDA; Location:[facility] OR     HYSTERECTOMY TOTAL ABDOMINAL, BILATERAL SALPINGO-OOPHORECTOMY, COMBINED  11/7/2011    Procedure:COMBINED HYSTERECTOMY TOTAL ABDOMINAL, BILATERAL SALPINGO-OOPHORECTOMY; total abdominal hysterectomy, bilateral salpingo-oophorectomy; Surgeon:ALETA MANUEL; Location:[facility] OR     INSERT STENT URETER  11/7/2011    Procedure:INSERT STENT URETER; Placement of Bilateral Ureteral Stents ; Surgeon:PRANEETH BRYANT; Location:[facility] OR     SIGMOIDOSCOPY FLEXIBLE  11/3/2011    Procedure:SIGMOIDOSCOPY FLEXIBLE; Flexible Sigmoidoscopy; Surgeon:CK CASTANEDA; Location:[facility] OR     TAKEDOWN ILEOSTOMY  2/1/2012    Procedure:TAKEDOWN ILEOSTOMY; Takedown Loop Ileostomy ; Surgeon:CK CASTANEDA; Location:[facility] OR       Family History   Problem Relation Age of Onset     C.A.D. Mother 63        MI- first at age 63     Heart Disease Mother      Hypertension Mother       Cerebrovascular Disease Mother      Hyperlipidemia Mother      Alcohol/Drug Father      Alzheimer Disease Father      Dementia Father      Hypertension Father      Hyperlipidemia Father      Diabetes Sister      C.A.D. Sister 52        Minor MI- age 50's     Heart Disease Sister      Hypertension Sister      Hypertension Sister      Hypertension Brother      Cancer - colorectal Sister 48        Late 40's early 50's     Prostate Cancer Brother 74        Dx'd age 74     Gastrointestinal Disease Sister         Diverticulitis     Gastrointestinal Disease Brother         Diverticulitis     Lipids Sister      Lipids Sister      Parkinsonism Brother      Diabetes Sister      Heart Disease Sister         CHF     Cancer Sister         lung, smoker     Substance Abuse Sister      Substance Abuse Brother      Asthma Sister      Cancer Sister      Breast Cancer Daughter      Prostate Cancer Brother      Hyperlipidemia Brother      Diabetes Other      Hypertension Other        Social History     Tobacco Use     Smoking status: Former Smoker     Packs/day: 0.50     Years: 10.00     Pack years: 5.00     Types: Cigarettes     Last attempt to quit: 2011     Years since quittin.0     Smokeless tobacco: Never Used     Tobacco comment:  ppd   Substance Use Topics     Alcohol use: No     Alcohol/week: 0.0 oz     Comment: In recovery beginning        No current facility-administered medications for this encounter.      Current Outpatient Medications   Medication     acetaminophen (TYLENOL) 500 MG tablet     acyclovir (ZOVIRAX) 400 MG tablet     acyclovir (ZOVIRAX) 5 % external ointment     albuterol (PROAIR HFA, PROVENTIL HFA, VENTOLIN HFA) 108 (90 BASE) MCG/ACT inhaler     atorvastatin (LIPITOR) 10 MG tablet     azithromycin (ZITHROMAX) 250 MG tablet     BD JANE U/F 32G X 4 MM insulin pen needle     blood glucose (ACCU-CHEK SHANNON PLUS) test strip     blood glucose monitoring (ACCU-CHEK SHANNON PLUS) test strip      blood glucose monitoring (ACCU-CHEK FASTCLIX) lancets     blood glucose monitoring (ACCU-CHEK MULTICLIX) lancets     COMPRESSION STOCKINGS     escitalopram (LEXAPRO) 20 MG tablet     fluticasone (FLONASE) 50 MCG/ACT nasal spray     fluticasone (FLOVENT HFA) 220 MCG/ACT inhaler     Humidifier MISC     hydroxychloroquine (PLAQUENIL) 200 MG tablet     insulin glargine (BASAGLAR KWIKPEN) 100 UNIT/ML pen     ketoconazole (NIZORAL) 2 % external cream     lidocaine (LIDODERM) 5 % patch     loratadine (CLARITIN) 10 MG tablet     metFORMIN (GLUCOPHAGE-XR) 500 MG 24 hr tablet     metoprolol succinate (TOPROL XL) 25 MG 24 hr tablet     metoprolol succinate (TOPROL-XL) 100 MG 24 hr tablet     montelukast (SINGULAIR) 10 MG tablet     NOVOLOG FLEXPEN 100 UNIT/ML soln     order for DME     order for DME     order for DME     order for DME     potassium chloride SA (K-DUR/KLOR-CON M) 20 MEQ CR tablet     predniSONE (DELTASONE) 5 MG tablet     rivaroxaban ANTICOAGULANT (XARELTO) 20 MG TABS tablet     Semaglutide (OZEMPIC) 0.25 or 0.5 MG/DOSE SOPN     spironolactone (ALDACTONE) 25 MG tablet     tiZANidine (ZANAFLEX) 2 MG tablet     torsemide (DEMADEX) 10 MG tablet     torsemide (DEMADEX) 20 MG tablet     traZODone (DESYREL) 50 MG tablet        Allergies   Allergen Reactions     Amoxicillin-Pot Clavulanate      Augmentin Nausea and Vomiting     Codeine Nausea and Vomiting     Codeine      PN: LW Reaction: HIVES     Penicillins Nausea and Vomiting     PN: LW Reaction: GI Upset     Phenobarbital Itching     Phenobarbital      Seasonal Allergies        Review of Systems   Musculoskeletal: Positive for arthralgias (rigth wrist pain + swelling).   Neurological: Negative for syncope and headaches.   All other systems reviewed and are negative.      Physical Exam   BP: 136/66  Pulse: 83  Heart Rate: 83  Temp: 96.7  F (35.9  C)  Resp: 18  SpO2: 96 %      Physical Exam   Constitutional: She is oriented to person, place, and time. No distress.    HENT:   Head: Atraumatic.   Mouth/Throat: Oropharynx is clear and moist. No oropharyngeal exudate.   Eyes: Pupils are equal, round, and reactive to light. No scleral icterus.   Cardiovascular: Normal heart sounds and intact distal pulses.   Pulmonary/Chest: Breath sounds normal. No respiratory distress.   Abdominal: Soft. Bowel sounds are normal. There is no tenderness.   Musculoskeletal:        Right wrist: She exhibits decreased range of motion, tenderness and swelling. She exhibits no crepitus and no deformity.   Neurological: She is alert and oriented to person, place, and time. She exhibits normal muscle tone. Coordination normal.   Skin: Skin is warm. No rash noted. She is not diaphoretic.       ED Course        Procedures         Critical Care time:  none     Results for orders placed or performed during the hospital encounter of 08/04/19   XR Wrist Right 3 Views    Narrative    Examination:  XR WRIST RT G/E 3 VW    Date:  8/4/2019 12:17 PM     Clinical Information: Wrist pain after a fall.     Additional Information: none    Comparison: none    Findings:     Negative for fracture or dislocation. Severe osteoarthrosis at the  base of the thumb and the first CMC joint. Normal soft tissues.      Impression    Impression:    1. No fracture or joint malalignment in the right wrist.    SACHIN OLMOS MD     *Note: Due to a large number of results and/or encounters for the requested time period, some results have not been displayed. A complete set of results can be found in Results Review.         Assessments & Plan (with Medical Decision Making)       I have reviewed the nursing notes.    I have reviewed the findings, diagnosis, plan and need for follow up with the patient.  Patient with sprain of right wrist at this time placed in a thumb immobilizing wrist splint will be discharged home with instructions to elevate with ice as tolerated Tylenol for discomfort and follow-up with primary MD as  needed.      Final diagnoses:   Sprain of right wrist, initial encounter     I, Daniela Welsh, am serving as a trained medical scribe to document services personally performed by Fidel Terrazas MD, based on the provider's statements to me.   IFidel MD, was physically present and have reviewed and verified the accuracy of this note documented by Daniela Welsh.    8/4/2019   [facility], [place], EMERGENCY DEPARTMENT     Fidel Terrazas MD  08/04/19 [id number]

## 2019-08-04 NOTE — DISCHARGE INSTRUCTIONS
Discharged home plan on using wrist brace elevate with ice with Tylenol for discomfort follow-up with primary MD as needed.

## 2019-08-06 RX ORDER — EMPAGLIFLOZIN 10 MG/1
10 TABLET, FILM COATED ORAL DAILY
Refills: 0 | COMMUNITY
Start: 2019-06-18 | End: 2019-08-13

## 2019-08-06 NOTE — TELEPHONE ENCOUNTER
JARDIANCE 10 MG TABS tablet  Last Written Prescription Date:  08/06/2019  Last Fill Quantity: NA,  # refills: NA   Last office visit: 7/10/2019 with prescribing provider:  [place]   Future Office Visit:09/03/2019 WITH [place]   Next 5 appointments (look out 90 days)    Sep 03, 2019  1:00 PM CDT  Office Visit with Ilene Tristan MD  [facility] ([facility]) [address] [place]  [facility] [address]  [phone]   Sep 03, 2019  2:00 PM CDT  Office Visit with Sabrina Meek [facility] ([facility]) [address]  [address]  [facility] [address]  [phone]         Requested Prescriptions   Pending Prescriptions Disp Refills     JARDIANCE 10 MG TABS tablet  0     Sig: Take 1 tablet (10 mg) by mouth daily       Sodium Glucose Co-Transport Inhibitor Agents Passed - 8/6/2019  2:06 PM        Passed - Blood pressure less than 140/90 in past 6 months     BP Readings from Last 3 Encounters:   08/04/19 138/75   07/18/19 115/67   07/11/19 130/80                 Passed - Patient has documented LDL within the past 12 mos.     Recent Labs   Lab Test 04/09/19  1017   LDL 29             Passed - Patient has had a Microalbumin in the past 15 mos.     Recent Labs   Lab Test 04/09/19  1017   MICROL 28   UMALCR 50.98*             Passed - Patient has documented A1c within the specified period of time.     If HgbA1C is 8 or greater, it needs to be on file within the past 3 months.  If less than 8, must be on file within the past 6 months.     Recent Labs   Lab Test 04/09/19  1017   A1C 7.2*             Passed - No creatinine >1.4 or GFR <45 within the past 12 mos     Recent Labs   Lab Test 07/10/19  1240   GFRESTIMATED 36*   GFRESTBLACK 42*       Recent Labs   Lab Test 07/10/19  1240   CR 1.39*             Passed - Medication is active on med list        Passed - Patient is age 18 or older        Passed - Patient is not pregnant        Passed -  "Patient has documented normal Potassium within the last 12 mos.     Recent Labs   Lab Test 07/10/19  1240   POTASSIUM 3.8             Passed - Patient has no positive pregnancy test within the past 12 mos.        Passed - Recent (6 mo) or future (30 days) visit within the authorizing provider's specialty     Patient had office visit in the last 6 months or has a visit in the next 30 days with authorizing provider or within the authorizing provider's specialty.  See \"Patient Info\" tab in inbasket, or \"Choose Columns\" in Meds & Orders section of the refill encounter.          Signed Prescriptions Disp Refills    JARDIANCE 10 MG TABS tablet  0     Sig: Take 10 mg by mouth daily       There is no refill protocol information for this order        "

## 2019-08-07 RX ORDER — EMPAGLIFLOZIN 10 MG/1
10 TABLET, FILM COATED ORAL DAILY
Qty: 30 TABLET | Refills: 0 | OUTPATIENT
Start: 2019-08-07

## 2019-08-07 NOTE — TELEPHONE ENCOUNTER
CW  Routing refill request to provider for review/approval because:  Medication is reported/historical  Thanks,  Sophia Hemphill RN

## 2019-08-07 NOTE — TELEPHONE ENCOUNTER
See MTM note from 7/11/19 (and TE's/refills since).  Pt was switched from jardiance to ozempic.  Pharmacy continues to request refills.  Sent in 'denied- adjustment in therapy' on rx to pharmacy.  CW

## 2019-08-08 ENCOUNTER — OFFICE VISIT (OUTPATIENT)
Dept: CARDIOLOGY | Facility: CLINIC | Age: 79
End: 2019-08-08
Attending: INTERNAL MEDICINE
Payer: MEDICARE

## 2019-08-08 VITALS
HEART RATE: 58 BPM | OXYGEN SATURATION: 96 % | HEIGHT: 66 IN | SYSTOLIC BLOOD PRESSURE: 108 MMHG | WEIGHT: 206.4 LBS | DIASTOLIC BLOOD PRESSURE: 58 MMHG | BODY MASS INDEX: 33.17 KG/M2

## 2019-08-08 DIAGNOSIS — I50.32 CHRONIC HEART FAILURE WITH PRESERVED EJECTION FRACTION (H): ICD-10-CM

## 2019-08-08 DIAGNOSIS — I50.30 (HFPEF) HEART FAILURE WITH PRESERVED EJECTION FRACTION (H): ICD-10-CM

## 2019-08-08 LAB
ANION GAP SERPL CALCULATED.3IONS-SCNC: 8 MMOL/L (ref 3–14)
BUN SERPL-MCNC: 22 MG/DL (ref 7–30)
CALCIUM SERPL-MCNC: 9.2 MG/DL (ref 8.5–10.1)
CHLORIDE SERPL-SCNC: 102 MMOL/L (ref 94–109)
CO2 SERPL-SCNC: 26 MMOL/L (ref 20–32)
CREAT SERPL-MCNC: 1.09 MG/DL (ref 0.52–1.04)
GFR SERPL CREATININE-BSD FRML MDRD: 48 ML/MIN/{1.73_M2}
GLUCOSE SERPL-MCNC: 139 MG/DL (ref 70–99)
NT-PROBNP SERPL-MCNC: 147 PG/ML (ref 0–450)
POTASSIUM SERPL-SCNC: 4.3 MMOL/L (ref 3.4–5.3)
SODIUM SERPL-SCNC: 136 MMOL/L (ref 133–144)

## 2019-08-08 PROCEDURE — 99214 OFFICE O/P EST MOD 30 MIN: CPT | Mod: ZP | Performed by: INTERNAL MEDICINE

## 2019-08-08 PROCEDURE — 83880 ASSAY OF NATRIURETIC PEPTIDE: CPT | Performed by: INTERNAL MEDICINE

## 2019-08-08 PROCEDURE — 80048 BASIC METABOLIC PNL TOTAL CA: CPT | Performed by: INTERNAL MEDICINE

## 2019-08-08 PROCEDURE — G0463 HOSPITAL OUTPT CLINIC VISIT: HCPCS | Mod: ZF

## 2019-08-08 PROCEDURE — 36415 COLL VENOUS BLD VENIPUNCTURE: CPT | Performed by: INTERNAL MEDICINE

## 2019-08-08 ASSESSMENT — ENCOUNTER SYMPTOMS
NIGHT SWEATS: 0
LIGHT-HEADEDNESS: 0
PANIC: 0
SHORTNESS OF BREATH: 1
SINUS PAIN: 0
NAUSEA: 0
COUGH: 0
HYPERTENSION: 0
WEIGHT GAIN: 0
DIARRHEA: 0
NECK MASS: 0
ABDOMINAL PAIN: 0
COUGH DISTURBING SLEEP: 0
ORTHOPNEA: 0
HEARTBURN: 0
DYSPNEA ON EXERTION: 1
WEIGHT LOSS: 1
SYNCOPE: 0
INCREASED ENERGY: 0
FEVER: 0
SMELL DISTURBANCE: 0
FATIGUE: 0
POLYDIPSIA: 1
TROUBLE SWALLOWING: 0
DEPRESSION: 0
DECREASED CONCENTRATION: 1
LEG PAIN: 0
SPUTUM PRODUCTION: 0
ALTERED TEMPERATURE REGULATION: 0
BLOATING: 0
SLEEP DISTURBANCES DUE TO BREATHING: 0
HYPOTENSION: 0
HOARSE VOICE: 1
EXERCISE INTOLERANCE: 0
BLOOD IN STOOL: 0
NERVOUS/ANXIOUS: 1
SNORES LOUDLY: 0
VOMITING: 0
HEMOPTYSIS: 0
WHEEZING: 1
CONSTIPATION: 0
TASTE DISTURBANCE: 0
POSTURAL DYSPNEA: 0
CHILLS: 0
DECREASED APPETITE: 0
SINUS CONGESTION: 0
HALLUCINATIONS: 0
PALPITATIONS: 1

## 2019-08-08 ASSESSMENT — MIFFLIN-ST. JEOR: SCORE: 1420.03

## 2019-08-08 ASSESSMENT — PAIN SCALES - GENERAL: PAINLEVEL: NO PAIN (0)

## 2019-08-08 NOTE — NURSING NOTE
Diet: Patient instructed regarding a heart failure healthy diet, including discussion of reduced fat and 2000 mg daily sodium restriction, daily weights, medication purpose and compliance, fluid restrictions and resources for patient and family to access for assistance with heart failure management.       Labs: Patient was given results of the laboratory testing obtained today and patient was instructed about when to return for the next laboratory testing.     Med Reconcile: Reviewed and verified all current medications with the patient. The updated medication list was printed and given to the patient.     Med changes: no changes    Return Appointment: Patient given instructions regarding scheduling next clinic visit: RTC in 1 year with an echo    Patient stated she understood all health information given and agreed to call with further questions or concerns.     Flor Velasquez RN

## 2019-08-08 NOTE — PROGRESS NOTES
August 8, 2019    Follow up HFpEF    HPI:   78 yo F with interstitial lung disease (moderate restriction), Sjogren's syndrome, HTN, permanent atrial fibrillation, diverticulitis s/p colectomy 2011 who I am following for HFpEF. She is here for routine HFpEF follow up.     Since I last saw her she feels her breathing is stable. She can walk a block or two before stopping from SOB.  Her weight has been stable. She denies orthopnea/ PND. Her leg swelling is improved from the last we saw her and is now minimal.  She denies chest pain. She has not been in the hospital.     She does use oxygen when doing more strenuous physical activity.     Her blood pressures have been running on the low side and she does report some dizziness.     She just injured her wrist with a mechanical fall.       PAST MEDICAL HISTORY:  Past Medical History:   Diagnosis Date     Alcohol abuse, in remission      Allergic rhinitis, cause unspecified     allegra helps when she takes it     Antiplatelet or antithrombotic long-term use      Atrial fibrillation (H)     in hosp in 11/11 after surgery w/ fluid overload     Cardiomegaly     LVH on stress echo- cardiac w/u at [facility]- neg CT scan for PE, neg stress echo in 8/06     Chest pain, unspecified      Disorder of bone and cartilage, unspecified     osteopenia (had been on prempro), improved on 6/06 dexa, stable dexa 11/10     Diverticulosis of colon (without mention of hemorrhage)     last episode yrs ago     Essential hypertension, benign      Follicular bronchiolitis (H)     associated with Sjogrens, dx by chest CT showing mosaic attenuation and air trapping     Gastro-oesophageal reflux disease      ILD (interstitial lung disease) (H)     associated with Sjogrens, also has mildly elevated IgG4, first noted on chest CT 2015 (mild changes) and also has small airways disease; ILD improved on follow up chest CT 2018.     Insomnia, unspecified     weaned off clonazepam     Irregular heart beat       Lumbago 7/09    MRI with NEAL, now seeing Dr. Cain for sciatic sx's     Major depressive disorder, recurrent episode, moderate (H)      Obstructive sleep apnea      Osteoarthrosis, unspecified whether generalized or localized, unspecified site      Sjogren's syndrome (H)     + RG and SSA and lip bx     Sleep apnea      Tobacco use disorder     chantix in 9/07, started again in 6/08, working       FAMILY HISTORY:  Mother had MI and CHF in her 60's. 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      CURRENT MEDICATIONS:  Current Outpatient Medications   Medication Sig Dispense Refill     acetaminophen (TYLENOL) 500 MG tablet Take 500 mg by mouth nightly as needed        acyclovir (ZOVIRAX) 400 MG tablet Take 1 tablet (400 mg) by mouth 3 times daily For a couple days 15 tablet 2     acyclovir (ZOVIRAX) 5 % external ointment Apply topically 6 times daily As needed for outbreaks 15 g 3     albuterol (PROAIR HFA, PROVENTIL HFA, VENTOLIN HFA) 108 (90 BASE) MCG/ACT inhaler Inhale 2 puffs into the lungs every 6 hours 1 Inhaler 3     atorvastatin (LIPITOR) 10 MG tablet TAKE 1 TABLET BY MOUTH EVERY DAY 90 tablet 0     azithromycin (ZITHROMAX) 250 MG tablet Take 1 tablet (250 mg) by mouth daily 30 tablet 11     BD JANE U/F 32G X 4 MM insulin pen needle USE 4 DAILY AS DIRECTED. 400 each 1     blood glucose (ACCU-CHEK SHANNON PLUS) test strip USE TO TEST BLOOD SUGARS 3 TIMES DAILY 400 strip 3     blood glucose monitoring (ACCU-CHEK SHANNON PLUS) test strip Use to test blood sugar 4 times daily or as directed.  Ok to substitute alternative if insurance prefers. 120 strip 11     blood glucose monitoring (ACCU-CHEK FASTCLIX) lancets Use to test blood sugar 4 times daily or as directed.  Ok to substitute alternative if insurance prefers. 1 Box 11     blood glucose monitoring (ACCU-CHEK MULTICLIX) lancets Use to test blood sugar 4 times daily or as  directed. 4 Box 3     COMPRESSION STOCKINGS Wear compression stockings in affected leg (right leg) or both legs most of the time during the day and take them off at night. 2 each 2     dulaglutide (TRULICITY) 0.75 MG/0.5ML pen Inject 0.75 mg Subcutaneous every 7 days       escitalopram (LEXAPRO) 20 MG tablet TAKE 1 TABLET BY MOUTH EVERY DAY 90 tablet 0     fluticasone (FLONASE) 50 MCG/ACT nasal spray Spray 1-2 sprays into both nostrils daily as needed for allergies 1 Bottle 3     fluticasone (FLOVENT HFA) 220 MCG/ACT inhaler Inhale 2 puffs into the lungs 2 times daily 3 Inhaler 3     Humidifier MISC Continuous humidified air via concentrator.   Diagnosis: ILD  Duration: Lifetime 99. 1 each 1     hydroxychloroquine (PLAQUENIL) 200 MG tablet Take 2 tablets (400 mg) by mouth daily Annual Plaquenil toxicity eye screening required. 180 tablet 1     insulin glargine (BASAGLAR KWIKPEN) 100 UNIT/ML pen INJECT 25 UNITS SUBCUTANEOUS DAILY (TO REPLACE LANTUS) 15 mL 1     JARDIANCE 10 MG TABS tablet Take 10 mg by mouth daily  0     ketoconazole (NIZORAL) 2 % external cream Apply topically 2 times daily 60 g 1     lidocaine (LIDODERM) 5 % patch Apply patch to painful area for up to 12 h within a 24 h period.  Remove after 12 hours. 30 patch 5     loratadine (CLARITIN) 10 MG tablet Take 1 tablet (10 mg) by mouth daily 90 tablet 1     metFORMIN (GLUCOPHAGE-XR) 500 MG 24 hr tablet TAKE 2 TABLETS BY MOUTH DAILY WITH DINNER 180 tablet 0     metoprolol succinate (TOPROL XL) 25 MG 24 hr tablet Take 0.5 tablets (12.5 mg) by mouth 2 times daily Take 50 mg by mouth in combination with 12.5 for a total of 62.5 twice a day 90 tablet 3     metoprolol succinate (TOPROL-XL) 100 MG 24 hr tablet Take 50 mg by mouth in combination with 12.5 for a total of 62.5 twice a day 90 tablet 3     montelukast (SINGULAIR) 10 MG tablet TAKE 1 TABLET BY MOUTH EVERYDAY AT BEDTIME 90 tablet 0     NOVOLOG FLEXPEN 100 UNIT/ML soln INJECT 12 UNITS SUBCUTANEOUS 3  TIMES DAILY (WITH MEALS) 15 mL 1     order for DME Please provide patient with a POC.  Okay to test patient on POC to maintain oxygen saturations above 90%.   Patient currently uses 2 to 3 LPM oxygen with activity. 1 Device 0     order for DME Equipment being ordered: Oxygen  Pulsed oxygen portable tank  Rate: 4LNC  Diagnosis: ILD  Duration: Lifetime -99 1 Device 1     order for DME Equipment being ordered: Full face mask for CPAP - size: F10 large 1 each 0     order for DME Oxygen: Patient requires supplemental Oxygen 4 LPM via nasal canula with activity. Please provide patient with a home unit and with portability capability. Oxygen will be for a lifetime. 1 Device 0     potassium chloride SA (K-DUR/KLOR-CON M) 20 MEQ CR tablet Take 2 tabs (40 meq) in am and 1 tab (20 meq) in pm daily 270 tablet 3     predniSONE (DELTASONE) 5 MG tablet Starting 5/13/19 alternate between 6 and 8mg once a day 90 tablet 0     rivaroxaban ANTICOAGULANT (XARELTO) 20 MG TABS tablet Take 1 tablet (20 mg) by mouth daily (with dinner) 90 tablet 3     Semaglutide (OZEMPIC) 0.25 or 0.5 MG/DOSE SOPN Inject 0.25 mg Subcutaneous once a week for 28 days, THEN 0.5 mg once a week for 28 days. 1.5 mL 0     spironolactone (ALDACTONE) 25 MG tablet Take 2 tablets (50 mg) by mouth daily 180 tablet 3     tiZANidine (ZANAFLEX) 2 MG tablet Take 1-2 tablets (2-4 mg) by mouth 3 times daily 90 tablet 1     torsemide (DEMADEX) 10 MG tablet Take one tab (10 mg) with one 20 mg tab to = 30 mg daily in afternoon. 90 tablet 3     torsemide (DEMADEX) 20 MG tablet Take 2 tabs (40 mg) in am daily 105 tablet 10     traZODone (DESYREL) 50 MG tablet Take 0.5-1 tablets (25-50 mg) by mouth nightly as needed for sleep 30 tablet 5       ROS:   Constitutional: No fever, chills. No weight gain/loss.   ENT: eye hematoma ear ache, epistaxis, sore throat.   Allergies/Immunologic: Negative.   Respiratory: + intermittent cough, no hemoptysis.   Cardiovascular: As per HPI.   GI: No  "nausea, vomiting, hematemesis, melena, or hematochezia.   : No urinary frequency, dysuria, or hematuria.   Integument: Negative.   Psychiatric: Negative.   Neuro: Negative.   Endocrinology: Negative.   Musculoskeletal: wrist sprain     EXAM:  /58 (BP Location: Left arm, Patient Position: Chair, Cuff Size: Adult Regular)   Pulse 58   Ht 1.664 m (5' 5.5\")   Wt 93.6 kg (206 lb 6.4 oz)   SpO2 96%   BMI 33.82 kg/m    General: appears comfortable, alert and articulate  Head: normocephalic, atraumatic  Eyes: R eye with conjunctival hemorrhage, vision intact    Neck: no adenopathy  Orophyarynx: moist mucosa, no lesions, dentition intact  Heart: Irregular, S1/S2, no murmur, gallop, rub, estimated JVP <10   Lungs: diffuse expiratory wheezing, no crackles,no dullness   Abdomen: soft, non-tender, bowel sounds present, no hepatosplenomegaly  Extremities: trace LE edema, no clubbing, cyanosis.- right wrist wrapped   Neurological: normal speech and affect, no gross motor deficits    Labs:  CBC RESULTS:  Lab Results   Component Value Date    WBC 15.6 (H) 11/08/2017    RBC 4.59 11/08/2017    HGB 13.4 11/08/2017    HCT 42.1 11/08/2017    MCV 92 11/08/2017    MCH 29.2 11/08/2017    MCHC 31.8 11/08/2017    RDW 16.7 (H) 11/08/2017     11/08/2017       CMP RESULTS:  Lab Results   Component Value Date     08/08/2019    POTASSIUM 4.3 08/08/2019    CHLORIDE 102 08/08/2019    CO2 26 08/08/2019    ANIONGAP 8 08/08/2019     (H) 08/08/2019    BUN 22 08/08/2019    CR 1.09 (H) 08/08/2019    GFRESTIMATED 48 (L) 08/08/2019    GFRESTBLACK 56 (L) 08/08/2019    CLAUDY 9.2 08/08/2019    BILITOTAL 0.7 05/23/2018    ALBUMIN 3.3 (L) 05/23/2018    ALKPHOS 71 05/23/2018    ALT 21 05/23/2018    AST 15 05/23/2018        INR RESULTS:  Lab Results   Component Value Date    INR 2.37 (H) 02/14/2019       Lab Results   Component Value Date    MAG 2.0 04/11/2017     Lab Results   Component Value Date    NTBNPI 3,531 (H) 04/06/2017 "     Lab Results   Component Value Date    NTBNP 147 08/08/2019     HgA1c 7% Sept 2016    Echocardiogram: 2/2019   Interpretation Summary  Global and regional left ventricular function is normal with an EF of 60-65%.  Right ventricular function, chamber size, wall motion, and thickness are  normal.  No significant valvular abnormalities noted.  The inferior vena cava was normal in size with preserved respiratory  variability.  This study was compared to a prior TTE from 10/17/2017. There has been no  significant change.  _____________________________________________________________________________    48hr holter July 2016- generally controlled atrial fibrillation    July 2016 Complete Portable Echo Adult. Contrast Optison. Optison (NDC #8615-4152-93)  given intravenously. Patient was given 4 ml mixture of 3 ml Optison and 6 ml  saline. 5 ml wasted. Interpretation Summary  Atrial fibrillation  Left ventricular function, chamber size, wall motion, and wall thickness are  normal.The EF is 60-65%. Normal LV size and wall thickness, mild bilateral atrial enlargement  PA pressure cannot be determined  Estimated RA pressure 8 mmHg    Regadenosen MPI 2014: no fixed or inducible defects    Assessment and Plan:  In summary this is a very pleasant 79 year old female with suspected HFpEF who is here for HFpEF follow up.     In support of the diagnosis of HFpEF includes mild LA enlargement, echocardiographic signs of increase LV filling pressures, increased nt-bnp, as well as a diuretic requirement and symptomatic improvement on diuretics.    The risk factors for HFpEF in her include age, gender, HTN, pre-diabetes, sleep apnea and obesity.  A prior stress test was negative for CAD. She is presently euvolemic on exam and is NYHA class III (mutifactorial) I am increasing her diuretics as listed below.     The mainstays of therapy for HFpEF include volume management, blood pressure control, treating underlying sleep apnea if  present, weight management, exercise training, rate control for atrial fibrillation as well as consideration for a rhythm control strategy, as well as consideration for clinical trials.  I do have her on spironolactone given the results of the TOPCAT trial. Patients have symptomatically felt very improved on this medication. Her a fib rates have been under better control recently and her ischemia evaluation is negative.     Plan for HFpEF: continue current dose of diuretics, BP is controlled, volume status is euvolemic     Other:   Hypertension: if anything her blood pressure is too well controlled, stopping lisinopril today     Af fib:- atrial fibrillation [id number] (age >75, HTN, CHF, gender) - rates controlled on metoprolol , on a NOAC      Primary prevention: on statin, no ASA given she is on anti-coagulation        Radha Rosa  [facility]      Director,  [facility] HFpEF Program       CC  Patient Care Team:  Ilene Tristan MD as PCP - General  Ilene Tristan MD as Assigned PCP  Kirill Polk MD as MD (Otolaryngology)  Aubrey Hurtado MD as MD (Cardiology)  Anderson Perez MD as MD (Clinical Cardiac Electrophysiology)  Radha Rosa MD as MD (Cardiology)  Kiesha Elias, LIZY CNP as Nurse Practitioner (Cardiology)  Beatriz Blanco, RN as Nurse Coordinator (Cardiology)  Shante Nash MD as Resident (Student in organized health care education/training program)  Carmenza Stringer MD as MD (Rheumatology)  Danay Payne, RN as Specialty Care Coordinator (Cardiology)  Sabrina Meek [facility] as Pharmacist (Pharmacist)  Flor Velasquez, RN as Registered Nurse (Cardiology)  KIESHA ELIAS

## 2019-08-08 NOTE — PATIENT INSTRUCTIONS
"Cardiology Providers you saw during your visit:  Dr. Rosa    Medication changes:  1. No changes    Follow up:  1.  Return to clinic in a year with an echo  Labs:  Results for MARCIA SISTER MARCIA VILLALOBOS (MRN [id number]) as of 8/8/2019 15:34   Ref. Range 8/8/2019 14:34   Sodium Latest Ref Range: 133 - 144 mmol/L 136   Potassium Latest Ref Range: 3.4 - 5.3 mmol/L 4.3   Chloride Latest Ref Range: 94 - 109 mmol/L 102   Carbon Dioxide Latest Ref Range: 20 - 32 mmol/L 26   Urea Nitrogen Latest Ref Range: 7 - 30 mg/dL 22   Creatinine Latest Ref Range: 0.52 - 1.04 mg/dL 1.09 (H)   GFR Estimate Latest Ref Range: >60 mL/min/1.73_m2 48 (L)   GFR Estimate If Black Latest Ref Range: >60 mL/min/1.73_m2 56 (L)   Calcium Latest Ref Range: 8.5 - 10.1 mg/dL 9.2   Anion Gap Latest Ref Range: 3 - 14 mmol/L 8   N-Terminal Pro Bnp Latest Ref Range: 0 - 450 pg/mL 147   Glucose Latest Ref Range: 70 - 99 mg/dL 139 (H)       Please call if you have :  1. Weight gain of more than 2 pounds in a day or 5 pounds in a week  2. Increased shortness of breath, swelling or bloating  3. Dizziness, lightheadedness   4. Any questions or concerns.       Follow the American Heart Association Diet and Lifestyle recommendations:  Limit saturated fat, trans fat, sodium, red meat, sweets and sugar-sweetened beverages. If you choose to eat red meat, compare labels and select the leanest cuts available.  Aim for at least 150 minutes of moderate physical activity or 75 minutes of vigorous physical activity - or an equal combination of both - each week.      During business hours: [phone], press option # 1 to be routed to the [facility] then option # 4 for \"To send a message to your care team\"      After hours, weekends or holidays: On Call Cardiologist- [phone]   option #4 and ask to speak to the on-call Cardiologist. Inform them you are a CORE/heart failure patient at the [facility].      Flor Velasquez RN BSN  Cardiology Care Coordinator - Heart " "Failure/ C.O.R.E[facility] Clinic  [facility]   Questions and [phone]   First press #1 for the [employer] and then press #4 for \"To send a message to your care team\"    "

## 2019-08-08 NOTE — TELEPHONE ENCOUNTER
Second message left.   Sabrina Meek, PharmD, JAMES, BCACP  [facility] Pharmacist, [facility]

## 2019-08-08 NOTE — LETTER
8/8/2019      RE: Betty Tee  [address]  [facility] [address]       Dear Colleague,    Thank you for the opportunity to participate in the care of your patient, Betty Tee, at the [facility] at [facility]. Please see a copy of my visit note below.    August 8, 2019    Follow up HFpEF    HPI:   78 yo F with interstitial lung disease (moderate restriction), Sjogren's syndrome, HTN, permanent atrial fibrillation, diverticulitis s/p colectomy 2011 who I am following for HFpEF. She is here for routine HFpEF follow up.     Since I last saw her she feels her breathing is stable. She can walk a block or two before stopping from SOB.  Her weight has been stable. She denies orthopnea/ PND. Her leg swelling is improved from the last we saw her and is now minimal.  She denies chest pain. She has not been in the hospital.     She does use oxygen when doing more strenuous physical activity.     Her blood pressures have been running on the low side and she does report some dizziness.     She just injured her wrist with a mechanical fall.       PAST MEDICAL HISTORY:  Past Medical History:   Diagnosis Date     Alcohol abuse, in remission      Allergic rhinitis, cause unspecified     allegra helps when she takes it     Antiplatelet or antithrombotic long-term use      Atrial fibrillation (H)     in hosp in 11/11 after surgery w/ fluid overload     Cardiomegaly     LVH on stress echo- cardiac w/u at [facility] ER- neg CT scan for PE, neg stress echo in 8/06     Chest pain, unspecified      Disorder of bone and cartilage, unspecified     osteopenia (had been on prempro), improved on 6/06 dexa, stable dexa 11/10     Diverticulosis of colon (without mention of hemorrhage)     last episode yrs ago     Essential hypertension, benign      Follicular bronchiolitis (H)     associated with Sjogrens, dx by chest CT showing mosaic attenuation and air trapping      Gastro-oesophageal reflux disease      ILD (interstitial lung disease) (H)     associated with Sjogrens, also has mildly elevated IgG4, first noted on chest CT 2015 (mild changes) and also has small airways disease; ILD improved on follow up chest CT 2018.     Insomnia, unspecified     weaned off clonazepam     Irregular heart beat      Lumbago 7/09    MRI with NEAL, now seeing Dr. Cain for sciatic sx's     Major depressive disorder, recurrent episode, moderate (H)      Obstructive sleep apnea      Osteoarthrosis, unspecified whether generalized or localized, unspecified site      Sjogren's syndrome (H)     + RG and SSA and lip bx     Sleep apnea      Tobacco use disorder     chantix in 9/07, started again in 6/08, working       FAMILY HISTORY:  Mother had MI and CHF in her 60's. 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      CURRENT MEDICATIONS:  Current Outpatient Medications   Medication Sig Dispense Refill     acetaminophen (TYLENOL) 500 MG tablet Take 500 mg by mouth nightly as needed        acyclovir (ZOVIRAX) 400 MG tablet Take 1 tablet (400 mg) by mouth 3 times daily For a couple days 15 tablet 2     acyclovir (ZOVIRAX) 5 % external ointment Apply topically 6 times daily As needed for outbreaks 15 g 3     albuterol (PROAIR HFA, PROVENTIL HFA, VENTOLIN HFA) 108 (90 BASE) MCG/ACT inhaler Inhale 2 puffs into the lungs every 6 hours 1 Inhaler 3     atorvastatin (LIPITOR) 10 MG tablet TAKE 1 TABLET BY MOUTH EVERY DAY 90 tablet 0     azithromycin (ZITHROMAX) 250 MG tablet Take 1 tablet (250 mg) by mouth daily 30 tablet 11     BD JANE U/F 32G X 4 MM insulin pen needle USE 4 DAILY AS DIRECTED. 400 each 1     blood glucose (ACCU-CHEK SHANNON PLUS) test strip USE TO TEST BLOOD SUGARS 3 TIMES DAILY 400 strip 3     blood glucose monitoring (ACCU-CHEK SHANNON PLUS) test strip Use to test blood sugar 4 times daily or as directed.  Ok to  substitute alternative if insurance prefers. 120 strip 11     blood glucose monitoring (ACCU-CHEK FASTCLIX) lancets Use to test blood sugar 4 times daily or as directed.  Ok to substitute alternative if insurance prefers. 1 Box 11     blood glucose monitoring (ACCU-CHEK MULTICLIX) lancets Use to test blood sugar 4 times daily or as directed. 4 Box 3     COMPRESSION STOCKINGS Wear compression stockings in affected leg (right leg) or both legs most of the time during the day and take them off at night. 2 each 2     dulaglutide (TRULICITY) 0.75 MG/0.5ML pen Inject 0.75 mg Subcutaneous every 7 days       escitalopram (LEXAPRO) 20 MG tablet TAKE 1 TABLET BY MOUTH EVERY DAY 90 tablet 0     fluticasone (FLONASE) 50 MCG/ACT nasal spray Spray 1-2 sprays into both nostrils daily as needed for allergies 1 Bottle 3     fluticasone (FLOVENT HFA) 220 MCG/ACT inhaler Inhale 2 puffs into the lungs 2 times daily 3 Inhaler 3     Humidifier MISC Continuous humidified air via concentrator.   Diagnosis: ILD  Duration: Lifetime 99. 1 each 1     hydroxychloroquine (PLAQUENIL) 200 MG tablet Take 2 tablets (400 mg) by mouth daily Annual Plaquenil toxicity eye screening required. 180 tablet 1     insulin glargine (BASAGLAR KWIKPEN) 100 UNIT/ML pen INJECT 25 UNITS SUBCUTANEOUS DAILY (TO REPLACE LANTUS) 15 mL 1     JARDIANCE 10 MG TABS tablet Take 10 mg by mouth daily  0     ketoconazole (NIZORAL) 2 % external cream Apply topically 2 times daily 60 g 1     lidocaine (LIDODERM) 5 % patch Apply patch to painful area for up to 12 h within a 24 h period.  Remove after 12 hours. 30 patch 5     loratadine (CLARITIN) 10 MG tablet Take 1 tablet (10 mg) by mouth daily 90 tablet 1     metFORMIN (GLUCOPHAGE-XR) 500 MG 24 hr tablet TAKE 2 TABLETS BY MOUTH DAILY WITH DINNER 180 tablet 0     metoprolol succinate (TOPROL XL) 25 MG 24 hr tablet Take 0.5 tablets (12.5 mg) by mouth 2 times daily Take 50 mg by mouth in combination with 12.5 for a total of 62.5  twice a day 90 tablet 3     metoprolol succinate (TOPROL-XL) 100 MG 24 hr tablet Take 50 mg by mouth in combination with 12.5 for a total of 62.5 twice a day 90 tablet 3     montelukast (SINGULAIR) 10 MG tablet TAKE 1 TABLET BY MOUTH EVERYDAY AT BEDTIME 90 tablet 0     NOVOLOG FLEXPEN 100 UNIT/ML soln INJECT 12 UNITS SUBCUTANEOUS 3 TIMES DAILY (WITH MEALS) 15 mL 1     order for DME Please provide patient with a POC.  Okay to test patient on POC to maintain oxygen saturations above 90%.   Patient currently uses 2 to 3 LPM oxygen with activity. 1 Device 0     order for DME Equipment being ordered: Oxygen  Pulsed oxygen portable tank  Rate: 4LNC  Diagnosis: ILD  Duration: Lifetime -99 1 Device 1     order for DME Equipment being ordered: Full face mask for CPAP - size: F10 large 1 each 0     order for DME Oxygen: Patient requires supplemental Oxygen 4 LPM via nasal canula with activity. Please provide patient with a home unit and with portability capability. Oxygen will be for a lifetime. 1 Device 0     potassium chloride SA (K-DUR/KLOR-CON M) 20 MEQ CR tablet Take 2 tabs (40 meq) in am and 1 tab (20 meq) in pm daily 270 tablet 3     predniSONE (DELTASONE) 5 MG tablet Starting 5/13/19 alternate between 6 and 8mg once a day 90 tablet 0     rivaroxaban ANTICOAGULANT (XARELTO) 20 MG TABS tablet Take 1 tablet (20 mg) by mouth daily (with dinner) 90 tablet 3     Semaglutide (OZEMPIC) 0.25 or 0.5 MG/DOSE SOPN Inject 0.25 mg Subcutaneous once a week for 28 days, THEN 0.5 mg once a week for 28 days. 1.5 mL 0     spironolactone (ALDACTONE) 25 MG tablet Take 2 tablets (50 mg) by mouth daily 180 tablet 3     tiZANidine (ZANAFLEX) 2 MG tablet Take 1-2 tablets (2-4 mg) by mouth 3 times daily 90 tablet 1     torsemide (DEMADEX) 10 MG tablet Take one tab (10 mg) with one 20 mg tab to = 30 mg daily in afternoon. 90 tablet 3     torsemide (DEMADEX) 20 MG tablet Take 2 tabs (40 mg) in am daily 105 tablet 10     traZODone (DESYREL) 50 MG  "tablet Take 0.5-1 tablets (25-50 mg) by mouth nightly as needed for sleep 30 tablet 5       ROS:   Constitutional: No fever, chills. No weight gain/loss.   ENT: eye hematoma ear ache, epistaxis, sore throat.   Allergies/Immunologic: Negative.   Respiratory: + intermittent cough, no hemoptysis.   Cardiovascular: As per HPI.   GI: No nausea, vomiting, hematemesis, melena, or hematochezia.   : No urinary frequency, dysuria, or hematuria.   Integument: Negative.   Psychiatric: Negative.   Neuro: Negative.   Endocrinology: Negative.   Musculoskeletal: wrist sprain     EXAM:  /58 (BP Location: Left arm, Patient Position: Chair, Cuff Size: Adult Regular)   Pulse 58   Ht 1.664 m (5' 5.5\")   Wt 93.6 kg (206 lb 6.4 oz)   SpO2 96%   BMI 33.82 kg/m     General: appears comfortable, alert and articulate  Head: normocephalic, atraumatic  Eyes: R eye with conjunctival hemorrhage, vision intact    Neck: no adenopathy  Orophyarynx: moist mucosa, no lesions, dentition intact  Heart: Irregular, S1/S2, no murmur, gallop, rub, estimated JVP <10   Lungs: diffuse expiratory wheezing, no crackles,no dullness   Abdomen: soft, non-tender, bowel sounds present, no hepatosplenomegaly  Extremities: trace LE edema, no clubbing, cyanosis.- right wrist wrapped   Neurological: normal speech and affect, no gross motor deficits    Labs:  CBC RESULTS:  Lab Results   Component Value Date    WBC 15.6 (H) 11/08/2017    RBC 4.59 11/08/2017    HGB 13.4 11/08/2017    HCT 42.1 11/08/2017    MCV 92 11/08/2017    MCH 29.2 11/08/2017    MCHC 31.8 11/08/2017    RDW 16.7 (H) 11/08/2017     11/08/2017       CMP RESULTS:  Lab Results   Component Value Date     08/08/2019    POTASSIUM 4.3 08/08/2019    CHLORIDE 102 08/08/2019    CO2 26 08/08/2019    ANIONGAP 8 08/08/2019     (H) 08/08/2019    BUN 22 08/08/2019    CR 1.09 (H) 08/08/2019    GFRESTIMATED 48 (L) 08/08/2019    GFRESTBLACK 56 (L) 08/08/2019    CLAUDY 9.2 08/08/2019    " BILITOTAL 0.7 05/23/2018    ALBUMIN 3.3 (L) 05/23/2018    ALKPHOS 71 05/23/2018    ALT 21 05/23/2018    AST 15 05/23/2018        INR RESULTS:  Lab Results   Component Value Date    INR 2.37 (H) 02/14/2019       Lab Results   Component Value Date    MAG 2.0 04/11/2017     Lab Results   Component Value Date    NTBNPI 3,531 (H) 04/06/2017     Lab Results   Component Value Date    NTBNP 147 08/08/2019     HgA1c 7% Sept 2016    Echocardiogram: 2/2019   Interpretation Summary  Global and regional left ventricular function is normal with an EF of 60-65%.  Right ventricular function, chamber size, wall motion, and thickness are  normal.  No significant valvular abnormalities noted.  The inferior vena cava was normal in size with preserved respiratory  variability.  This study was compared to a prior TTE from 10/17/2017. There has been no  significant change.  _____________________________________________________________________________    48hr holter July 2016- generally controlled atrial fibrillation    July 2016 Complete Portable Echo Adult. Contrast Optison. Optison (NDC #2889-4675-79)  given intravenously. Patient was given 4 ml mixture of 3 ml Optison and 6 ml  saline. 5 ml wasted. Interpretation Summary  Atrial fibrillation  Left ventricular function, chamber size, wall motion, and wall thickness are  normal.The EF is 60-65%. Normal LV size and wall thickness, mild bilateral atrial enlargement  PA pressure cannot be determined  Estimated RA pressure 8 mmHg    Regadenosen MPI 2014: no fixed or inducible defects    Assessment and Plan:  In summary this is a very pleasant 79 year old female with suspected HFpEF who is here for HFpEF follow up.     In support of the diagnosis of HFpEF includes mild LA enlargement, echocardiographic signs of increase LV filling pressures, increased nt-bnp, as well as a diuretic requirement and symptomatic improvement on diuretics.    The risk factors for HFpEF in her include age, gender,  HTN, pre-diabetes, sleep apnea and obesity.  A prior stress test was negative for CAD. She is presently euvolemic on exam and is NYHA class III (mutifactorial) I am increasing her diuretics as listed below.     The mainstays of therapy for HFpEF include volume management, blood pressure control, treating underlying sleep apnea if present, weight management, exercise training, rate control for atrial fibrillation as well as consideration for a rhythm control strategy, as well as consideration for clinical trials.  I do have her on spironolactone given the results of the TOPCAT trial. Patients have symptomatically felt very improved on this medication. Her a fib rates have been under better control recently and her ischemia evaluation is negative.     Plan for HFpEF: continue current dose of diuretics, BP is controlled, volume status is euvolemic     Other:   Hypertension: if anything her blood pressure is too well controlled, stopping lisinopril today     Af fib:- atrial fibrillation [id number] (age >75, HTN, CHF, gender) - rates controlled on metoprolol , on a NOAC      Primary prevention: on statin, no ASA given she is on anti-coagulation        Radha Rosa  [facility]      Director,  [facility] HFpEF Program       CC  Patient Care Team:  Ilene Tristan MD as PCP - General  Ilene Tristan MD as Assigned PCP  Kirill Polk MD as MD (Otolaryngology)  Aubrey Hurtado MD as MD (Cardiology)  Anderson Perez MD as MD (Clinical Cardiac Electrophysiology)  Radha Rosa MD as MD (Cardiology)  Kiesha Elias APRN CNP as Nurse Practitioner (Cardiology)  Beatriz Blanco RN as Nurse Coordinator (Cardiology)  Shante Nash MD as Resident (Student in organized health care education/training program)  Carmenza Stringer MD as MD (Rheumatology)  Danay Payne, JASON as Specialty Care Coordinator  (Cardiology)  Sabrina Meek, RP as Pharmacist (Pharmacist)  Flor Velasquez, RN as Registered Nurse (Cardiology)  NORRIS MEEHAN

## 2019-08-13 ENCOUNTER — TELEPHONE (OUTPATIENT)
Dept: PHARMACY | Facility: CLINIC | Age: 79
End: 2019-08-13

## 2019-08-13 NOTE — TELEPHONE ENCOUNTER
Pt returned call - left VM - doing well so far, no questions. Has appt scheduled for 9/3.     Called pt to f/u on SE from [facility]. No answer, message left.   Sabrina Meek, PharmD, JAMES, BCACP  MTM Pharmacist, [facility]

## 2019-08-16 ENCOUNTER — TELEPHONE (OUTPATIENT)
Dept: RHEUMATOLOGY | Facility: CLINIC | Age: 79
End: 2019-08-16

## 2019-08-16 ENCOUNTER — ANCILLARY PROCEDURE (OUTPATIENT)
Dept: GENERAL RADIOLOGY | Facility: CLINIC | Age: 79
End: 2019-08-16
Attending: INTERNAL MEDICINE
Payer: MEDICARE

## 2019-08-16 ENCOUNTER — OFFICE VISIT (OUTPATIENT)
Dept: RHEUMATOLOGY | Facility: CLINIC | Age: 79
End: 2019-08-16
Attending: INTERNAL MEDICINE
Payer: MEDICARE

## 2019-08-16 VITALS
DIASTOLIC BLOOD PRESSURE: 69 MMHG | BODY MASS INDEX: 33.97 KG/M2 | OXYGEN SATURATION: 96 % | WEIGHT: 207.3 LBS | SYSTOLIC BLOOD PRESSURE: 104 MMHG | HEART RATE: 61 BPM

## 2019-08-16 DIAGNOSIS — R07.9 CHEST PAIN, UNSPECIFIED TYPE: ICD-10-CM

## 2019-08-16 DIAGNOSIS — M85.80 OSTEOPENIA, UNSPECIFIED LOCATION: Primary | ICD-10-CM

## 2019-08-16 DIAGNOSIS — Z79.52 CURRENT CHRONIC USE OF SYSTEMIC STEROIDS: ICD-10-CM

## 2019-08-16 DIAGNOSIS — M35.02 SJOGREN'S SYNDROME WITH LUNG INVOLVEMENT (H): ICD-10-CM

## 2019-08-16 DIAGNOSIS — R32 URINARY INCONTINENCE, UNSPECIFIED TYPE: ICD-10-CM

## 2019-08-16 DIAGNOSIS — M85.80 OSTEOPENIA, UNSPECIFIED LOCATION: ICD-10-CM

## 2019-08-16 LAB
ALBUMIN SERPL-MCNC: 3.5 G/DL (ref 3.4–5)
ALBUMIN UR-MCNC: NEGATIVE MG/DL
ALT SERPL W P-5'-P-CCNC: 16 U/L (ref 0–50)
APPEARANCE UR: ABNORMAL
AST SERPL W P-5'-P-CCNC: 12 U/L (ref 0–45)
BACTERIA #/AREA URNS HPF: ABNORMAL /HPF
BASOPHILS # BLD AUTO: 0.1 10E9/L (ref 0–0.2)
BASOPHILS NFR BLD AUTO: 0.4 %
BILIRUB UR QL STRIP: NEGATIVE
COLOR UR AUTO: YELLOW
CREAT SERPL-MCNC: 1.05 MG/DL (ref 0.52–1.04)
CREAT UR-MCNC: 60 MG/DL
CRP SERPL-MCNC: 41.3 MG/L (ref 0–8)
DIFFERENTIAL METHOD BLD: ABNORMAL
EOSINOPHIL # BLD AUTO: 0 10E9/L (ref 0–0.7)
EOSINOPHIL NFR BLD AUTO: 0.3 %
ERYTHROCYTE [DISTWIDTH] IN BLOOD BY AUTOMATED COUNT: 13.9 % (ref 10–15)
ERYTHROCYTE [SEDIMENTATION RATE] IN BLOOD BY WESTERGREN METHOD: 28 MM/H (ref 0–30)
GFR SERPL CREATININE-BSD FRML MDRD: 50 ML/MIN/{1.73_M2}
GLUCOSE UR STRIP-MCNC: NEGATIVE MG/DL
HCT VFR BLD AUTO: 44 % (ref 35–47)
HGB BLD-MCNC: 14.1 G/DL (ref 11.7–15.7)
HGB UR QL STRIP: NEGATIVE
HYALINE CASTS #/AREA URNS LPF: 3 /LPF (ref 0–2)
IMM GRANULOCYTES # BLD: 0.1 10E9/L (ref 0–0.4)
IMM GRANULOCYTES NFR BLD: 0.4 %
KETONES UR STRIP-MCNC: NEGATIVE MG/DL
LEUKOCYTE ESTERASE UR QL STRIP: ABNORMAL
LYMPHOCYTES # BLD AUTO: 1 10E9/L (ref 0.8–5.3)
LYMPHOCYTES NFR BLD AUTO: 7.7 %
MCH RBC QN AUTO: 30.9 PG (ref 26.5–33)
MCHC RBC AUTO-ENTMCNC: 32 G/DL (ref 31.5–36.5)
MCV RBC AUTO: 96 FL (ref 78–100)
MONOCYTES # BLD AUTO: 1.3 10E9/L (ref 0–1.3)
MONOCYTES NFR BLD AUTO: 9.9 %
MUCOUS THREADS #/AREA URNS LPF: PRESENT /LPF
NEUTROPHILS # BLD AUTO: 10.3 10E9/L (ref 1.6–8.3)
NEUTROPHILS NFR BLD AUTO: 81.3 %
NITRATE UR QL: NEGATIVE
NRBC # BLD AUTO: 0 10*3/UL
NRBC BLD AUTO-RTO: 0 /100
PH UR STRIP: 5 PH (ref 5–7)
PLATELET # BLD AUTO: 229 10E9/L (ref 150–450)
PROT UR-MCNC: 0.28 G/L
PROT/CREAT 24H UR: 0.47 G/G CR (ref 0–0.2)
RBC # BLD AUTO: 4.57 10E12/L (ref 3.8–5.2)
RBC #/AREA URNS AUTO: 1 /HPF (ref 0–2)
SOURCE: ABNORMAL
SP GR UR STRIP: 1.01 (ref 1–1.03)
SQUAMOUS #/AREA URNS AUTO: 3 /HPF (ref 0–1)
UROBILINOGEN UR STRIP-MCNC: 0 MG/DL (ref 0–2)
WBC # BLD AUTO: 12.7 10E9/L (ref 4–11)
WBC #/AREA URNS AUTO: 5 /HPF (ref 0–5)

## 2019-08-16 PROCEDURE — 84450 TRANSFERASE (AST) (SGOT): CPT | Performed by: INTERNAL MEDICINE

## 2019-08-16 PROCEDURE — 84165 PROTEIN E-PHORESIS SERUM: CPT | Performed by: INTERNAL MEDICINE

## 2019-08-16 PROCEDURE — 00000402 ZZHCL STATISTIC TOTAL PROTEIN: Performed by: INTERNAL MEDICINE

## 2019-08-16 PROCEDURE — 82784 ASSAY IGA/IGD/IGG/IGM EACH: CPT | Performed by: INTERNAL MEDICINE

## 2019-08-16 PROCEDURE — 82565 ASSAY OF CREATININE: CPT | Performed by: INTERNAL MEDICINE

## 2019-08-16 PROCEDURE — 86160 COMPLEMENT ANTIGEN: CPT | Performed by: INTERNAL MEDICINE

## 2019-08-16 PROCEDURE — 82595 ASSAY OF CRYOGLOBULIN: CPT | Performed by: INTERNAL MEDICINE

## 2019-08-16 PROCEDURE — 84156 ASSAY OF PROTEIN URINE: CPT | Performed by: INTERNAL MEDICINE

## 2019-08-16 PROCEDURE — 82040 ASSAY OF SERUM ALBUMIN: CPT | Performed by: INTERNAL MEDICINE

## 2019-08-16 PROCEDURE — 85025 COMPLETE CBC W/AUTO DIFF WBC: CPT | Performed by: INTERNAL MEDICINE

## 2019-08-16 PROCEDURE — 36415 COLL VENOUS BLD VENIPUNCTURE: CPT | Performed by: INTERNAL MEDICINE

## 2019-08-16 PROCEDURE — 81001 URINALYSIS AUTO W/SCOPE: CPT | Performed by: INTERNAL MEDICINE

## 2019-08-16 PROCEDURE — 84460 ALANINE AMINO (ALT) (SGPT): CPT | Performed by: INTERNAL MEDICINE

## 2019-08-16 PROCEDURE — G0463 HOSPITAL OUTPT CLINIC VISIT: HCPCS | Mod: ZF

## 2019-08-16 PROCEDURE — 86140 C-REACTIVE PROTEIN: CPT | Performed by: INTERNAL MEDICINE

## 2019-08-16 PROCEDURE — 82306 VITAMIN D 25 HYDROXY: CPT | Performed by: INTERNAL MEDICINE

## 2019-08-16 PROCEDURE — 85652 RBC SED RATE AUTOMATED: CPT | Performed by: INTERNAL MEDICINE

## 2019-08-16 RX ORDER — HYDROXYCHLOROQUINE SULFATE 200 MG/1
400 TABLET, FILM COATED ORAL DAILY
Qty: 180 TABLET | Refills: 1 | Status: SHIPPED | OUTPATIENT
Start: 2019-08-16 | End: 2020-03-23

## 2019-08-16 RX ORDER — CEVIMELINE HYDROCHLORIDE 30 MG/1
30 CAPSULE ORAL 3 TIMES DAILY
Qty: 90 CAPSULE | Refills: 5 | Status: SHIPPED | OUTPATIENT
Start: 2019-08-16 | End: 2019-10-12

## 2019-08-16 RX ORDER — PREDNISONE 5 MG/1
TABLET ORAL
Qty: 90 TABLET | Refills: 1 | Status: SHIPPED | OUTPATIENT
Start: 2019-08-16 | End: 2020-01-15

## 2019-08-16 RX ORDER — PREDNISONE 1 MG/1
TABLET ORAL
Qty: 90 TABLET | Refills: 1 | Status: SHIPPED | OUTPATIENT
Start: 2019-08-16 | End: 2020-01-15

## 2019-08-16 ASSESSMENT — PAIN SCALES - GENERAL: PAINLEVEL: NO PAIN (0)

## 2019-08-16 NOTE — PATIENT INSTRUCTIONS
Prednisone taper: 6/7 mg every other day x one month, 6 mg a day x one month then 5 mg a day and stay on 5 mg a day    Try evoxac 30 mg three times a day (start with one tab a day) for dry mouth    Repeat DEXA bone density    Urology referral    Labs today    Second dose of shingrix is due between 9/2019-1/2020    Eye exam on plaquenil    Rib x-ray      Return in 4-5 months

## 2019-08-16 NOTE — NURSING NOTE
"Chief Complaint   Patient presents with     RECHECK     4 month f/u     Vital signs:      BP: 104/69 Pulse: 61     SpO2: 96 %       Weight: 94 kg (207 lb 4.8 oz)  Estimated body mass index is 33.97 kg/m  as calculated from the following:    Height as of 8/8/19: 1.664 m (5' 5.5\").    Weight as of this encounter: 94 kg (207 lb 4.8 oz).      Jennifer Munoz    "

## 2019-08-16 NOTE — LETTER
Patient:  Betty Tee  :   1940  MRN:     [id number]        Ms.Colleen GRISELDA Tee  [address] [facility] [address]        2019    Dear ,    We are writing to inform you of your test results. Labs are stable except high CRP inflammation  (could re-check at next visit) and low vit D. Is your chest wall pain better?      Results for orders placed or performed in visit on 19   Vitamin D Deficiency   Result Value Ref Range    Vitamin D Deficiency screening 32 20 - 75 ug/L   Immunoglobulins A G and M   Result Value Ref Range    IGG 1,400 695 - 1,620 mg/dL     (H) 70 - 380 mg/dL     60 - 265 mg/dL   Protein electrophoresis   Result Value Ref Range    Albumin Fraction 3.8 3.7 - 5.1 g/dL    Alpha 1 Fraction 0.4 0.2 - 0.4 g/dL    Alpha 2 Fraction 1.1 (H) 0.5 - 0.9 g/dL    Beta Fraction 1.3 (H) 0.6 - 1.0 g/dL    Gamma Fraction 1.4 0.7 - 1.6 g/dL    Monoclonal Peak 0.0 0.0 g/dL    ELP Interpretation:       Increased alpha 2 and beta fractions. Beta gamma bridging present, which is suggestive of   chronic hepatic disease. No monoclonal protein seen. Pathologic significance requires   clinical correlation.  NATE Leong M.D., Ph.D., Pathologist.      Protein  random urine with Creat Ratio   Result Value Ref Range    Protein Random Urine 0.28 g/L    Protein Total Urine g/gr Creatinine 0.47 (H) 0 - 0.2 g/g Cr   Routine UA with Micro Reflex to Culture   Result Value Ref Range    Color Urine Yellow     Appearance Urine Slightly Cloudy     Glucose Urine Negative NEG^Negative mg/dL    Bilirubin Urine Negative NEG^Negative    Ketones Urine Negative NEG^Negative mg/dL    Specific Gravity Urine 1.011 1.003 - 1.035    Blood Urine Negative NEG^Negative    pH Urine 5.0 5.0 - 7.0 pH    Protein Albumin Urine Negative NEG^Negative mg/dL    Urobilinogen mg/dL 0.0 0.0 - 2.0 mg/dL    Nitrite Urine Negative NEG^Negative    Leukocyte Esterase Urine Trace (A) NEG^Negative    Source  Midstream Urine     WBC Urine 5 0 - 5 /HPF    RBC Urine 1 0 - 2 /HPF    Bacteria Urine Few (A) NEG^Negative /HPF    Squamous Epithelial /HPF Urine 3 (H) 0 - 1 /HPF    Mucous Urine Present (A) NEG^Negative /LPF    Hyaline Casts 3 (H) 0 - 2 /LPF   Erythrocyte sedimentation rate auto   Result Value Ref Range    Sed Rate 28 0 - 30 mm/h   Cryoglobulin quantitative   Result Value Ref Range    Cryoglobulin Trace (A) NEG^Negative %   CRP inflammation   Result Value Ref Range    CRP Inflammation 41.3 (H) 0.0 - 8.0 mg/L   Complement C4   Result Value Ref Range    Complement C4 31 15 - 50 mg/dL   Complement C3   Result Value Ref Range    Complement C3 131 76 - 169 mg/dL   Creatinine   Result Value Ref Range    Creatinine 1.05 (H) 0.52 - 1.04 mg/dL    GFR Estimate 50 (L) >60 mL/min/[1.73_m2]    GFR Estimate If Black 58 (L) >60 mL/min/[1.73_m2]   Albumin level   Result Value Ref Range    Albumin 3.5 3.4 - 5.0 g/dL   ALT   Result Value Ref Range    ALT 16 0 - 50 U/L   AST   Result Value Ref Range    AST 12 0 - 45 U/L   CBC with platelets differential   Result Value Ref Range    WBC 12.7 (H) 4.0 - 11.0 10e9/L    RBC Count 4.57 3.8 - 5.2 10e12/L    Hemoglobin 14.1 11.7 - 15.7 g/dL    Hematocrit 44.0 35.0 - 47.0 %    MCV 96 78 - 100 fl    MCH 30.9 26.5 - 33.0 pg    MCHC 32.0 31.5 - 36.5 g/dL    RDW 13.9 10.0 - 15.0 %    Platelet Count 229 150 - 450 10e9/L    Diff Method Automated Method     % Neutrophils 81.3 %    % Lymphocytes 7.7 %    % Monocytes 9.9 %    % Eosinophils 0.3 %    % Basophils 0.4 %    % Immature Granulocytes 0.4 %    Nucleated RBCs 0 0 /100    Absolute Neutrophil 10.3 (H) 1.6 - 8.3 10e9/L    Absolute Lymphocytes 1.0 0.8 - 5.3 10e9/L    Absolute Monocytes 1.3 0.0 - 1.3 10e9/L    Absolute Eosinophils 0.0 0.0 - 0.7 10e9/L    Absolute Basophils 0.1 0.0 - 0.2 10e9/L    Abs Immature Granulocytes 0.1 0 - 0.4 10e9/L    Absolute Nucleated RBC 0.0    Creatinine urine calculation only   Result Value Ref Range    Creatinine  Urine 60 mg/dL     *Note: Due to a large number of results and/or encounters for the requested time period, some results have not been displayed. A complete set of results can be found in Results Review.       Zulma Lopez MD

## 2019-08-16 NOTE — TELEPHONE ENCOUNTER
Discussed with Dr. Lopez, xray does not show a fracture in ribs. Pt can use tylenol 1000 mg TID, along with warm packs and muscle cream such as Matt Garcia.    Left message requesting return call.    Kamille Ruffin RN  [facility]

## 2019-08-16 NOTE — LETTER
2019      RE: Betty Tee  [address]  [facility] [address]       [facility]  Rheumatology Clinic  Zulma Lopez MD  2019   Date of last visit with Dr. Stringer: 2019    Name: Betty Tee  MRN: [id number]  Age: 79 year old  : 1940  Reason for visit: Transfer care for primary Sjogren's syndrome     # Sjogren's syndrome since  with borderline +RG, +SSA, and lip biopsy focus score of 1.  # prednisone-responsive polyarthritis  # Follicular bronchiolitis, prior mild ILD  # Osteoporosis  # Chronic prednisone use  # DM  # A fib     Assessment and Plan:  Primary Sjogren's syndrome with ILD   Sister Sita returns with stable PFTs and improvement on most recent chest CT showing bronchiolitis, but no ILD. Completed pulmonary rehab in 2019 where she was able to exercise without oxygen.     On HCQ since 2019 with significant  Improvement of joint pain. Is here today to transfer care to me. Tolerates HCQ well. Current prednisone dose is 8/6 mg every other day. Her amjor complaint is dry mouth and pain over R ribs x 1 day responsive to tylenol.      Today's plan:    Prednisone taper: 6/7 mg every other day x one month, 6 mg a day x one month then 5 mg a day and stay on 5 mg a day    Try evoxac 30 mg three times a day (start with one tab a day) for dry mouth; risks were discussed    Repeat DEXA bone density, since  DEXA showed bone density and is on chronic steroid, was not interested in fosamax in the past.    Urology referral    Labs today    Second dose of shingrix is due between 2019-2020    Eye exam on plaquenil    Rib x-ray      Return in 4-5 months      Orders Placed This Encounter   Procedures     Dexa hip/pelvis/spine     X-ray rt Rib unilat 3 vws + PA chest     CBC with platelets differential     AST     ALT     Albumin level     Creatinine     Complement C3     Complement C4     CRP inflammation     Cryoglobulin quantitative     Erythrocyte sedimentation  rate auto     Routine UA with Micro Reflex to Culture     Protein  random urine with Creat Ratio     Creatinine random urine     Protein electrophoresis     Immunoglobulins A G and M     Vitamin D Deficiency     UROLOGY ADULT REFERRAL         Subjective:  Betty Tee is a 78 year old female with a history of primary Sjogren's syndrome who presents for follow-up. Her breathing has been much better, and she has not used oxygen at home, other than on very humid days or briefly to catch her breath after climbing basement stairs. She has noticed intermittent pain in the lower legs, right knee, hands, and most recently - left neck that she attributes to tapering down prednisone. Hence she has increased prednisone to 8/7 mg every other day. Denies joint swelling. States she is not impaired by pain, but it slows her down and interferes with mood. On lab review, recent A1C increased from 6.7 in 10/2018 to 7.2        HPI:   Betty Tee is a 78 year old female with a history of primary Sjogren's syndrome who presents for follow-up. She was diagnosed with Sjogren's syndrome in 2015 with borderline +RG, +SSA, and lip biopsy focus score of 1. Associated ILD and joint pain are prednisone-responsive which support the diagnosis. Ocular staining score has been deferred given other sources of diagnosis.    Interval history: 6/7/18   Today, the patient states she is feeling short of breath as she did not bring her oxygen tank with her. She states she has been feeling a bit more short of breath at home while going up and down her basement steps recently. She last saw her pulmonologist, Dr. Walsh, on 1/10/2018 and does not have recent pulmonary function tests. She has not needed to increase her oxygen levels at home, noting she has always used 4 liters, which seems to be sufficient for her. When she is out of the house she frequently leaves her oxygen tank at home. She does currently have an irritating, non-productive cough  that began approximately one week ago with associated postnasal drip. She has been experiencing night sweats and infrequent chills this week but denies any measured fevers.     She denies any joint pains today, but does state she had a flare of joint pain, specifically in her right knee, a few weeks ago. The pain hit very intensely prior to resolving.      With regards to starting rituximab, she voices she discussed this with her primary care provider who does not feel this is a good idea.     Interval history: 3/8/18  Patient reported doing rather well but did mention some trouble with intermittent arthralgias. We discussed consideration of rituximab given problems stemming from long-term prednisone use and she elected to think about this option. Plan was made for continuation of 10mg of prednisone daily for the time being. Recommended starting Fosamax, 70mg, once per week, however she was hesitant about side effects.     Today: Former pt of Dr. Stringer is here today to transfer care. Last night, woke up with pain over R ribs. Similar pain happened in 7/2019, self-resolved. Her x-rays at that time were suggestive of questionable rib Fx.    On  mg bid+prednisone 6/8 mg every other day (5/13/2019).    Dry mouth is bothersome, using OTC biotene but CPAP makes it worse.    Report losing bladder control on occasions.     Review of Systems:   A comprehensive ROS was done. Positives are per HPI.      Active Medications:     Current Outpatient Prescriptions:      ACCU-CHEK SHANNON PLUS test strip, USE TO TEST BLOOD SUGARS 4 TIMES DAILY OR AS DIRECTED, Disp: 400 strip, Rfl: 0     acetaminophen (TYLENOL) 500 MG tablet, Take 500 mg by mouth nightly as needed , Disp: , Rfl:      acyclovir (ZOVIRAX) 400 MG tablet, Take 1 tablet (400 mg) by mouth 3 times daily For a couple days, Disp: 15 tablet, Rfl: 2     acyclovir (ZOVIRAX) 5 % external ointment, Apply topically 6 times daily As needed for outbreaks, Disp: 15 g, Rfl: 3      albuterol (PROAIR HFA, PROVENTIL HFA, VENTOLIN HFA) 108 (90 BASE) MCG/ACT inhaler, Inhale 2 puffs into the lungs every 6 hours, Disp: 1 Inhaler, Rfl: 3     alendronate (FOSAMAX) 70 MG tablet, Take 1 tablet (70 mg) by mouth every 7 days (Patient not taking: Reported on 10/22/2018), Disp: 4 tablet, Rfl: 11     atorvastatin (LIPITOR) 10 MG tablet, TAKE 1 TABLET (10 MG) BY MOUTH DAILY, Disp: 90 tablet, Rfl: 0     BASAGLAR 100 UNIT/ML injection, Inject 25 Units Subcutaneous daily (to replace lantus), Disp: 15 mL, Rfl: 1     BD JANE U/F 32G X 4 MM insulin pen needle, USE 4 DAILY AS DIRECTED., Disp: 400 each, Rfl: 1     blood glucose monitoring (ACCU-CHEK SHANNON PLUS) test strip, Use to test blood sugar 4 times daily or as directed.  Ok to substitute alternative if insurance prefers., Disp: 120 strip, Rfl: 11     blood glucose monitoring (ACCU-CHEK FASTCLIX) lancets, Use to test blood sugar 4 times daily or as directed.  Ok to substitute alternative if insurance prefers., Disp: 1 Box, Rfl: 11     blood glucose monitoring (ACCU-CHEK MULTICLIX) lancets, Use to test blood sugar 4 times daily or as directed., Disp: 4 Box, Rfl: 3     COMPRESSION STOCKINGS, Wear compression stockings in affected leg (right leg) or both legs most of the time during the day and take them off at night., Disp: 2 each, Rfl: 2     escitalopram (LEXAPRO) 20 MG tablet, TAKE 1 TABLET (20 MG) BY MOUTH DAILY, Disp: 90 tablet, Rfl: 1     fluticasone (FLONASE) 50 MCG/ACT nasal spray, Spray 1-2 sprays into both nostrils daily as needed for allergies, Disp: 1 Bottle, Rfl: 3     fluticasone (FLOVENT HFA) 220 MCG/ACT inhaler, Inhale 2 puffs into the lungs 2 times daily, Disp: 3 Inhaler, Rfl: 3     gabapentin (NEURONTIN) 300 MG capsule, Take 1 capsule (300 mg) by mouth At Bedtime (Patient not taking: Reported on 10/22/2018), Disp: 30 capsule, Rfl: 0     Humidifier MISC, Continuous humidified air via concentrator.  Diagnosis: ILD Duration: Lifetime 99., Disp: 1  each, Rfl: 1     insulin pen needle (B-D U/F) 31G X 8 MM, Use 4 times daily (with Lantus and Novolog) or as directed., Disp: 400 each, Rfl: 3     ketoconazole (NIZORAL) 2 % external cream, Apply topically 2 times daily, Disp: 60 g, Rfl: 1     lidocaine (LIDODERM) 5 % Patch, Apply patch to painful area for up to 12 h within a 24 h period.  Remove after 12 hours. (Patient not taking: Reported on 10/22/2018), Disp: 30 patch, Rfl: 0     lisinopril (PRINIVIL/ZESTRIL) 2.5 MG tablet, Take 1 tablet (2.5 mg) by mouth daily, Disp: 90 tablet, Rfl: 2     metFORMIN (GLUCOPHAGE-XR) 500 MG 24 hr tablet, TAKE 2 TABLETS BY MOUTH DAILY WITH DINNER, Disp: 180 tablet, Rfl: 0     metoprolol succinate (TOPROL XL) 25 MG 24 hr tablet, Take 0.5 tablets (12.5 mg) by mouth 2 times daily Take 50 mg by mouth in combination with 12.5 for a total of 62.5 twice a day, Disp: 90 tablet, Rfl: 3     metoprolol succinate (TOPROL-XL) 100 MG 24 hr tablet, Take 50 mg by mouth in combination with 12.5 for a total of 62.5 twice a day, Disp: 90 tablet, Rfl: 3     montelukast (SINGULAIR) 10 MG tablet, TAKE 1 TABLET BY MOUTH EVERYDAY AT BEDTIME, Disp: 90 tablet, Rfl: 0     NOVOLOG FLEXPEN 100 UNIT/ML soln, INJECT 12 UNITS SUBCUTANEOUS 3 TIMES DAILY (WITH MEALS), Disp: 15 mL, Rfl: 1     order for DME, Equipment being ordered: Oxygen Pulsed oxygen portable tank Rate: 4LNC Diagnosis: ILD Duration: Lifetime -99, Disp: 1 Device, Rfl: 1     order for DME, Equipment being ordered: Full face mask for CPAP - size: F10 large, Disp: 1 each, Rfl: 0     order for DME, Oxygen: Patient requires supplemental Oxygen 4 LPM via nasal canula with activity. Please provide patient with a home unit and with portability capability. Oxygen will be for a lifetime., Disp: 1 Device, Rfl: 0     potassium chloride SA (K-DUR/KLOR-CON M) 20 MEQ CR tablet, Take 2 tabs (40 meq) in am and 1 tab (20 meq) in pm daily, Disp: 270 tablet, Rfl: 3     predniSONE (DELTASONE) 1 MG tablet, Use with 5 mg  tabs to taper: 10 mg and 9 mg every other day for 1m, 9 mg/day for 1m, 9 mg and 8 mg every other day for 1m, etc. until at 7mg, Disp: 120 tablet, Rfl: 6     predniSONE (DELTASONE) 5 MG tablet, Use with 1 mg tabs to taper: 10 mg and 9 mg every other day for 1m, 9 mg/day for 1m, 9 mg and 8 mg every other day for 1m, etc. until at 7mg, Disp: 30 tablet, Rfl: 6     spironolactone (ALDACTONE) 25 MG tablet, Take 2 tablets (50 mg) by mouth daily, Disp: 180 tablet, Rfl: 3     tiZANidine (ZANAFLEX) 2 MG tablet, Take 1-2 tablets (2-4 mg) by mouth 3 times daily, Disp: 90 tablet, Rfl: 1     torsemide (DEMADEX) 20 MG tablet, Take 40 mg in the morning, 30 mg in the afternoon., Disp: 105 tablet, Rfl: 3     traZODone (DESYREL) 50 MG tablet, Take 0.5-1 tablets (25-50 mg) by mouth nightly as needed for sleep, Disp: 30 tablet, Rfl: 5     warfarin (COUMADIN) 7.5 MG tablet, TAKE 1 TABLET BY MOUTH DAILY. CURRENT DOSE IS 7.5 MG DAILY. DOSE ADJUSTED PER INR RESULT., Disp: 90 tablet, Rfl: 3      Allergies:   Augmentin\  Codeine  Phenobarbital  Seasonal allergies      Past Medical History:  Alcohol abuse, in remission.   Allergic rhinitis.   Antiplatelet long-term use.   Atrial fibrillation.   Cardiomegaly.   Osteopenia.   Diverticulosis.   Benign essential hypertension.   GERD.   Insomnia.   Irregular heart beat.   Lumbago.   Major depressive disorder, recurrent episode, moderate.   ANGELICA.  Osteoarthrosis.   Sjogren's syndrome.   Sleep apnea.   Tobacco use disorder.   TMJ disorder.   RLS.  Major depressive disorder.   Hypertension.   Sciatica.   Colouterine fistula.   Left ventricular hypertrophy.   Breat fibroadenoma.   DVT.   Fatty liver disease, nonalcoholic.   Rib pain.   Neck mass.   Interstitial lung disease.   Acute and chronic respiratory failure with hypoxia.   Type II diabetes mellitus.   Hyperlipidemia.   Inflammatory arthritis.      Past Surgical History:  Back surgery.   Left breast biopsy.   Appendectomy.   Exploratory  laparotomy.   Cardiac surgery.   Cholecystectomy.   Left colectomy.   Total abdominal hysterectomy with bilateral salpingo-oophorectomy.   Insert ureter stent.   Flexible sigmoidoscopy.   Ileostomy takedown.     Family History:   Mother: Positive for CAD, heart disease, hypertension, cerebrovascular disease, hyperlipidemia.   Father: Positive for alcohol/drug abuse, Alzheimer disease, dementia, hypertension, hyperlipidemia.   Sister: Positive for CAD, hypertension, and colorectal cancer.   Sister: Positive for hypertension and hyperlipidemia.   Sister: Positive for diabetes, lung cancer, asthma, and heart disease.   Brother: Positive for Parkinsonism and substance abuse.   Brother: Positive for hypertension, diverticulitis, and prostate cancer.   Daughter: Positive for breast cancer.        Social History:   She is a former smoker; quit in 2011. She has a history of alcohol abuse but stopped drinking in 1986.      Physical Exam:   /69   Pulse 61   Wt 94 kg (207 lb 4.8 oz)   SpO2 96%   BMI 33.97 kg/m      Wt Readings from Last 4 Encounters:   08/16/19 94 kg (207 lb 4.8 oz)   08/08/19 93.6 kg (206 lb 6.4 oz)   07/18/19 95.7 kg (211 lb)   07/11/19 95.3 kg (210 lb)     Constitutional: Well-developed, appearing stated age; cooperative  Eyes: Normal EOM, PERRLA, vision, conjunctiva, sclera  ENT: Normal external ears, nose, hearing, lips, teeth, gums, throat. No mucous membrane lesions, normal saliva pool  Neck: No mass or thyroid enlargement  Resp: Good air movement, + mild scattered wheeze in the upper lobes BL  CV: no murmurs, rubs or gallops, no edema. Irregular rate, normal rhythm.   GI: No ABD mass or tenderness, no HSM  : Not tested  Lymph: No cervical, supraclavicular, inguinal or epitrochlear nodes  MS: The TMJ, neck, shoulder, elbow, wrist, MCP/PIP/DIP, spine, hip, knee, ankle, and foot MTP/IP joints were examined and found normal. No active synovitis or altered joint anatomy. Full joint ROM. Normal   strength. No dactylitis,  tenosynovitis, enthesopathy.  Skin: No nail pitting, alopecia, rash, nodules or lesions  Neuro: grossly non-focal  Psych: Normal judgement, orientation, memory, affect.    Images:  CT Chest w/o contrast (7/25/18):  Findings remain most consistent with small airway disease  and/or nonclassifiable interstitial lung disease and are not  significantly changed from the March 2017 comparison.    Per radiology.    Laboratory:   RHEUM RESULTS Latest Ref Rng & Units 6/26/2019 7/10/2019 8/8/2019   SED RATE 0 - 30 mm/h - - -   CRP, INFLAMMATION 0.0 - 8.0 mg/L - - -   CK TOTAL 30 - 225 U/L - - -   RHEUMATOID FACTOR <20 IU/mL - - -   RG SCREEN BY EIA <1.0 - - -   AST 0 - 45 U/L - - -   ALT 0 - 50 U/L - - -   ALBUMIN 3.4 - 5.0 g/dL - - -   WBC 4.0 - 11.0 10e9/L - - -   RBC 3.8 - 5.2 10e12/L - - -   HGB 11.7 - 15.7 g/dL - - -   HCT 35.0 - 47.0 % - - -   MCV 78 - 100 fl - - -   MCHC 31.5 - 36.5 g/dL - - -   RDW 10.0 - 15.0 % - - -    - 450 10e9/L - - -   CREATININE 0.52 - 1.04 mg/dL 1.08(H) 1.39(H) 1.09(H)   GFR ESTIMATE, IF BLACK >60 mL/min/[1.73:m2] 56(L) 42(L) 56(L)   GFR ESTIMATE >60 mL/min/[1.73:m2] 49(L) 36(L) 48(L)    - 1,620 mg/dL - - -       Rheumatoid Factor   Date Value Ref Range Status   07/24/2015 <20 <20 IU/mL Final   ,  ,   Cyclic Cit Pept IgG/IgA   Date Value Ref Range Status   07/24/2015 <20  Interpretation:  Negative   <20 UNITS Final   ,  ,   Scleroderma Antibody Scl-70 CECILIA IgG   Date Value Ref Range Status   07/24/2015  0.0 - 0.9 AI Final    <0.2  Negative   Antibody index (AI) values reflect qualitative changes in antibody   concentration that cannot be directly associated with clinical condition or   disease state.       SSA (Ro) (CECILIA) Antibody, IgG   Date Value Ref Range Status   07/24/2015 1.6 (H) 0.0 - 0.9 AI Final     Comment:     Positive   Antibody index (AI) values reflect qualitative changes in antibody   concentration that cannot be directly associated  with clinical condition or   disease state.       SSB (La) (CECILIA) Antibody, IgG   Date Value Ref Range Status   07/24/2015  0.0 - 0.9 AI Final    <0.2  Negative   Antibody index (AI) values reflect qualitative changes in antibody   concentration that cannot be directly associated with clinical condition or   disease state.       ,  ,   RG Screen by EIA   Date Value Ref Range Status   07/24/2015 <1.0  Interpretation:  Negative   <1.0 Final   ,  ,  ,  ,  ,  ,  ,  ,  ,  ,  ,  ,  ,  ,  ,  ,  ,  ,   Neutrophil Cytoplasmic IgG Antibody   Date Value Ref Range Status   07/24/2015   Final    <1:20  Reference range: <1:20  (Note)  The ANCA IFA is <1:20; therefore, no further testing will  be performed.  INTERPRETIVE INFORMATION: Anti-Neutrophil Cyto Ab, IgG  Neutrophil Cytoplasmic Antibodies (C-ANCA = granular  cytoplasmic staining, P-ANCA = perinuclear staining) are  found in the serum of over 90 percent of patients with  certain necrotizing systemic vasculitides, and usually in  less than 5 percent of patients with collagen vascular  disease or arthritis.  Performed by [facility],  [address] [place] [id number] [phone]  [email or web address], Burke Mckeon MD, Lab. Director       ,  ,  ,  ,  ,  ,  ,   IGG   Date Value Ref Range Status   07/24/2015 1320 695 - 1620 mg/dL Final   ,  ,  ,  ,  ,   Scleroderma Antibody Scl-70 CECILIA IgG   Date Value Ref Range Status   07/24/2015  0.0 - 0.9 AI Final    <0.2  Negative   Antibody index (AI) values reflect qualitative changes in antibody   concentration that cannot be directly associated with clinical condition or   disease state.         I reviewed and edited the above scribed note to reflect my findings and plan.     I discussed the findings and recommendations with the patient.  Recommendations were discussed with the consulting team.  Time spent: 30 minutes    Carmenza Stringer MD, MS  Rheumatology Attending Physician  pgr [phone]        Zulma Lopez MD

## 2019-08-16 NOTE — LETTER
Patient:  Betty Tee  :   1940  MRN:     [id number]        Ms.Colleen GRISELDA Tee  [address] [facility] [address]        2019    Dear ,    We are writing to inform you of your test results. No rib fracture, is the pain better? If not, recommend chest CT.      Results for orders placed or performed in visit on 19   X-ray rt Rib unilat 3 vws + PA chest    Narrative    EXAM: XR RIBS & CHEST RT 3VW  2019 1:27 PM      HISTORY: eval for rib fracture; Chest pain, unspecified type    COMPARISON: Chest x-ray 2019    FINDINGS: PA view of the chest 2 oblique views of the right  hemithorax.    No visible rib fracture. Please note that nondisplaced rib fractures  can be radiographically occult    Cardiac silhouette is borderline enlarged. Stable streaky opacities in  the left lung base, likely atelectasis or scarring. No pneumothorax.       Impression    IMPRESSION: No visible rib fracture.    ISIDRO ABRAHAM MD (Joe)     *Note: Due to a large number of results and/or encounters for the requested time period, some results have not been displayed. A complete set of results can be found in Results Review.       Zulma Lopez MD

## 2019-08-16 NOTE — TELEPHONE ENCOUNTER
Called and spoke with pt, as she returned call to clinic.  Provided Dr. Lopez's recommendations, along with instructions that if things get worse she should go to ER for further evaluation.    Pt understands, no further questions at this time.    Kamille Ruffin RN  Rheumatology Clinic

## 2019-08-16 NOTE — PROGRESS NOTES
[facility]  Rheumatology Clinic  Zulma Lopez MD  2019   Date of last visit with Dr. Stringer: 2019    Name: Betty Tee  MRN: [id number]  Age: 79 year old  : 1940  Reason for visit: Transfer care for primary Sjogren's syndrome     # Sjogren's syndrome since  with borderline +RG, +SSA, and lip biopsy focus score of 1.  # prednisone-responsive polyarthritis  # Follicular bronchiolitis, prior mild ILD  # Osteoporosis  # Chronic prednisone use  # DM  # A fib     Assessment and Plan:  Primary Sjogren's syndrome with ILD   Sister Sita returns with stable PFTs and improvement on most recent chest CT showing bronchiolitis, but no ILD. Completed pulmonary rehab in 2019 where she was able to exercise without oxygen.     On HCQ since 2019 with significant  Improvement of joint pain. Is here today to transfer care to me. Tolerates HCQ well. Current prednisone dose is 8/6 mg every other day. Her amjor complaint is dry mouth and pain over R ribs x 1 day responsive to tylenol.      Today's plan:    Prednisone taper: 6/7 mg every other day x one month, 6 mg a day x one month then 5 mg a day and stay on 5 mg a day    Try evoxac 30 mg three times a day (start with one tab a day) for dry mouth; risks were discussed    Repeat DEXA bone density, since  DEXA showed bone density and is on chronic steroid, was not interested in fosamax in the past.    Urology referral    Labs today    Second dose of shingrix is due between 2019-2020    Eye exam on plaquenil    Rib x-ray      Return in 4-5 months      Orders Placed This Encounter   Procedures     Dexa hip/pelvis/spine     X-ray rt Rib unilat 3 vws + PA chest     CBC with platelets differential     AST     ALT     Albumin level     Creatinine     Complement C3     Complement C4     CRP inflammation     Cryoglobulin quantitative     Erythrocyte sedimentation rate auto     Routine UA with Micro Reflex to Culture     Protein  random urine with Creat  Ratio     Creatinine random urine     Protein electrophoresis     Immunoglobulins A G and M     Vitamin D Deficiency     UROLOGY ADULT REFERRAL         Subjective:  Betty Tee is a 78 year old female with a history of primary Sjogren's syndrome who presents for follow-up. Her breathing has been much better, and she has not used oxygen at home, other than on very humid days or briefly to catch her breath after climbing basement stairs. She has noticed intermittent pain in the lower legs, right knee, hands, and most recently - left neck that she attributes to tapering down prednisone. Hence she has increased prednisone to 8/7 mg every other day. Denies joint swelling. States she is not impaired by pain, but it slows her down and interferes with mood. On lab review, recent A1C increased from 6.7 in 10/2018 to 7.2        HPI:   Betty Tee is a 78 year old female with a history of primary Sjogren's syndrome who presents for follow-up. She was diagnosed with Sjogren's syndrome in 2015 with borderline +RG, +SSA, and lip biopsy focus score of 1. Associated ILD and joint pain are prednisone-responsive which support the diagnosis. Ocular staining score has been deferred given other sources of diagnosis.    Interval history: 6/7/18   Today, the patient states she is feeling short of breath as she did not bring her oxygen tank with her. She states she has been feeling a bit more short of breath at home while going up and down her basement steps recently. She last saw her pulmonologist, Dr. Walsh, on 1/10/2018 and does not have recent pulmonary function tests. She has not needed to increase her oxygen levels at home, noting she has always used 4 liters, which seems to be sufficient for her. When she is out of the house she frequently leaves her oxygen tank at home. She does currently have an irritating, non-productive cough that began approximately one week ago with associated postnasal drip. She has been  experiencing night sweats and infrequent chills this week but denies any measured fevers.     She denies any joint pains today, but does state she had a flare of joint pain, specifically in her right knee, a few weeks ago. The pain hit very intensely prior to resolving.      With regards to starting rituximab, she voices she discussed this with her primary care provider who does not feel this is a good idea.     Interval history: 3/8/18  Patient reported doing rather well but did mention some trouble with intermittent arthralgias. We discussed consideration of rituximab given problems stemming from long-term prednisone use and she elected to think about this option. Plan was made for continuation of 10mg of prednisone daily for the time being. Recommended starting Fosamax, 70mg, once per week, however she was hesitant about side effects.     Today: Former pt of Dr. Stringer is here today to transfer care. Last night, woke up with pain over R ribs. Similar pain happened in 7/2019, self-resolved. Her x-rays at that time were suggestive of questionable rib Fx.    On  mg bid+prednisone 6/8 mg every other day (5/13/2019).    Dry mouth is bothersome, using OTC biotene but CPAP makes it worse.    Report losing bladder control on occasions.     Review of Systems:   A comprehensive ROS was done. Positives are per HPI.      Active Medications:     Current Outpatient Prescriptions:      ACCU-CHEK SHANNON PLUS test strip, USE TO TEST BLOOD SUGARS 4 TIMES DAILY OR AS DIRECTED, Disp: 400 strip, Rfl: 0     acetaminophen (TYLENOL) 500 MG tablet, Take 500 mg by mouth nightly as needed , Disp: , Rfl:      acyclovir (ZOVIRAX) 400 MG tablet, Take 1 tablet (400 mg) by mouth 3 times daily For a couple days, Disp: 15 tablet, Rfl: 2     acyclovir (ZOVIRAX) 5 % external ointment, Apply topically 6 times daily As needed for outbreaks, Disp: 15 g, Rfl: 3     albuterol (PROAIR HFA, PROVENTIL HFA, VENTOLIN HFA) 108 (90 BASE) MCG/ACT  inhaler, Inhale 2 puffs into the lungs every 6 hours, Disp: 1 Inhaler, Rfl: 3     alendronate (FOSAMAX) 70 MG tablet, Take 1 tablet (70 mg) by mouth every 7 days (Patient not taking: Reported on 10/22/2018), Disp: 4 tablet, Rfl: 11     atorvastatin (LIPITOR) 10 MG tablet, TAKE 1 TABLET (10 MG) BY MOUTH DAILY, Disp: 90 tablet, Rfl: 0     BASAGLAR 100 UNIT/ML injection, Inject 25 Units Subcutaneous daily (to replace lantus), Disp: 15 mL, Rfl: 1     BD JANE U/F 32G X 4 MM insulin pen needle, USE 4 DAILY AS DIRECTED., Disp: 400 each, Rfl: 1     blood glucose monitoring (ACCU-CHEK SHANNON PLUS) test strip, Use to test blood sugar 4 times daily or as directed.  Ok to substitute alternative if insurance prefers., Disp: 120 strip, Rfl: 11     blood glucose monitoring (ACCU-CHEK FASTCLIX) lancets, Use to test blood sugar 4 times daily or as directed.  Ok to substitute alternative if insurance prefers., Disp: 1 Box, Rfl: 11     blood glucose monitoring (ACCU-CHEK MULTICLIX) lancets, Use to test blood sugar 4 times daily or as directed., Disp: 4 Box, Rfl: 3     COMPRESSION STOCKINGS, Wear compression stockings in affected leg (right leg) or both legs most of the time during the day and take them off at night., Disp: 2 each, Rfl: 2     escitalopram (LEXAPRO) 20 MG tablet, TAKE 1 TABLET (20 MG) BY MOUTH DAILY, Disp: 90 tablet, Rfl: 1     fluticasone (FLONASE) 50 MCG/ACT nasal spray, Spray 1-2 sprays into both nostrils daily as needed for allergies, Disp: 1 Bottle, Rfl: 3     fluticasone (FLOVENT HFA) 220 MCG/ACT inhaler, Inhale 2 puffs into the lungs 2 times daily, Disp: 3 Inhaler, Rfl: 3     gabapentin (NEURONTIN) 300 MG capsule, Take 1 capsule (300 mg) by mouth At Bedtime (Patient not taking: Reported on 10/22/2018), Disp: 30 capsule, Rfl: 0     Humidifier MISC, Continuous humidified air via concentrator.  Diagnosis: ILD Duration: Lifetime 99., Disp: 1 each, Rfl: 1     insulin pen needle (B-D U/F) 31G X 8 MM, Use 4 times daily  (with Lantus and Novolog) or as directed., Disp: 400 each, Rfl: 3     ketoconazole (NIZORAL) 2 % external cream, Apply topically 2 times daily, Disp: 60 g, Rfl: 1     lidocaine (LIDODERM) 5 % Patch, Apply patch to painful area for up to 12 h within a 24 h period.  Remove after 12 hours. (Patient not taking: Reported on 10/22/2018), Disp: 30 patch, Rfl: 0     lisinopril (PRINIVIL/ZESTRIL) 2.5 MG tablet, Take 1 tablet (2.5 mg) by mouth daily, Disp: 90 tablet, Rfl: 2     metFORMIN (GLUCOPHAGE-XR) 500 MG 24 hr tablet, TAKE 2 TABLETS BY MOUTH DAILY WITH DINNER, Disp: 180 tablet, Rfl: 0     metoprolol succinate (TOPROL XL) 25 MG 24 hr tablet, Take 0.5 tablets (12.5 mg) by mouth 2 times daily Take 50 mg by mouth in combination with 12.5 for a total of 62.5 twice a day, Disp: 90 tablet, Rfl: 3     metoprolol succinate (TOPROL-XL) 100 MG 24 hr tablet, Take 50 mg by mouth in combination with 12.5 for a total of 62.5 twice a day, Disp: 90 tablet, Rfl: 3     montelukast (SINGULAIR) 10 MG tablet, TAKE 1 TABLET BY MOUTH EVERYDAY AT BEDTIME, Disp: 90 tablet, Rfl: 0     NOVOLOG FLEXPEN 100 UNIT/ML soln, INJECT 12 UNITS SUBCUTANEOUS 3 TIMES DAILY (WITH MEALS), Disp: 15 mL, Rfl: 1     order for DME, Equipment being ordered: Oxygen Pulsed oxygen portable tank Rate: 4LNC Diagnosis: ILD Duration: Lifetime -99, Disp: 1 Device, Rfl: 1     order for DME, Equipment being ordered: Full face mask for CPAP - size: F10 large, Disp: 1 each, Rfl: 0     order for DME, Oxygen: Patient requires supplemental Oxygen 4 LPM via nasal canula with activity. Please provide patient with a home unit and with portability capability. Oxygen will be for a lifetime., Disp: 1 Device, Rfl: 0     potassium chloride SA (K-DUR/KLOR-CON M) 20 MEQ CR tablet, Take 2 tabs (40 meq) in am and 1 tab (20 meq) in pm daily, Disp: 270 tablet, Rfl: 3     predniSONE (DELTASONE) 1 MG tablet, Use with 5 mg tabs to taper: 10 mg and 9 mg every other day for 1m, 9 mg/day for 1m, 9 mg  and 8 mg every other day for 1m, etc. until at 7mg, Disp: 120 tablet, Rfl: 6     predniSONE (DELTASONE) 5 MG tablet, Use with 1 mg tabs to taper: 10 mg and 9 mg every other day for 1m, 9 mg/day for 1m, 9 mg and 8 mg every other day for 1m, etc. until at 7mg, Disp: 30 tablet, Rfl: 6     spironolactone (ALDACTONE) 25 MG tablet, Take 2 tablets (50 mg) by mouth daily, Disp: 180 tablet, Rfl: 3     tiZANidine (ZANAFLEX) 2 MG tablet, Take 1-2 tablets (2-4 mg) by mouth 3 times daily, Disp: 90 tablet, Rfl: 1     torsemide (DEMADEX) 20 MG tablet, Take 40 mg in the morning, 30 mg in the afternoon., Disp: 105 tablet, Rfl: 3     traZODone (DESYREL) 50 MG tablet, Take 0.5-1 tablets (25-50 mg) by mouth nightly as needed for sleep, Disp: 30 tablet, Rfl: 5     warfarin (COUMADIN) 7.5 MG tablet, TAKE 1 TABLET BY MOUTH DAILY. CURRENT DOSE IS 7.5 MG DAILY. DOSE ADJUSTED PER INR RESULT., Disp: 90 tablet, Rfl: 3      Allergies:   Augmentin\  Codeine  Phenobarbital  Seasonal allergies      Past Medical History:  Alcohol abuse, in remission.   Allergic rhinitis.   Antiplatelet long-term use.   Atrial fibrillation.   Cardiomegaly.   Osteopenia.   Diverticulosis.   Benign essential hypertension.   GERD.   Insomnia.   Irregular heart beat.   Lumbago.   Major depressive disorder, recurrent episode, moderate.   ANGELICA.  Osteoarthrosis.   Sjogren's syndrome.   Sleep apnea.   Tobacco use disorder.   TMJ disorder.   RLS.  Major depressive disorder.   Hypertension.   Sciatica.   Colouterine fistula.   Left ventricular hypertrophy.   Breat fibroadenoma.   DVT.   Fatty liver disease, nonalcoholic.   Rib pain.   Neck mass.   Interstitial lung disease.   Acute and chronic respiratory failure with hypoxia.   Type II diabetes mellitus.   Hyperlipidemia.   Inflammatory arthritis.      Past Surgical History:  Back surgery.   Left breast biopsy.   Appendectomy.   Exploratory laparotomy.   Cardiac surgery.   Cholecystectomy.   Left colectomy.   Total abdominal  hysterectomy with bilateral salpingo-oophorectomy.   Insert ureter stent.   Flexible sigmoidoscopy.   Ileostomy takedown.     Family History:   Mother: Positive for CAD, heart disease, hypertension, cerebrovascular disease, hyperlipidemia.   Father: Positive for alcohol/drug abuse, Alzheimer disease, dementia, hypertension, hyperlipidemia.   Sister: Positive for CAD, hypertension, and colorectal cancer.   Sister: Positive for hypertension and hyperlipidemia.   Sister: Positive for diabetes, lung cancer, asthma, and heart disease.   Brother: Positive for Parkinsonism and substance abuse.   Brother: Positive for hypertension, diverticulitis, and prostate cancer.   Daughter: Positive for breast cancer.        Social History:   She is a former smoker; quit in 2011. She has a history of alcohol abuse but stopped drinking in 1986.      Physical Exam:   /69   Pulse 61   Wt 94 kg (207 lb 4.8 oz)   SpO2 96%   BMI 33.97 kg/m     Wt Readings from Last 4 Encounters:   08/16/19 94 kg (207 lb 4.8 oz)   08/08/19 93.6 kg (206 lb 6.4 oz)   07/18/19 95.7 kg (211 lb)   07/11/19 95.3 kg (210 lb)     Constitutional: Well-developed, appearing stated age; cooperative  Eyes: Normal EOM, PERRLA, vision, conjunctiva, sclera  ENT: Normal external ears, nose, hearing, lips, teeth, gums, throat. No mucous membrane lesions, normal saliva pool  Neck: No mass or thyroid enlargement  Resp: Good air movement, + mild scattered wheeze in the upper lobes BL  CV: no murmurs, rubs or gallops, no edema. Irregular rate, normal rhythm.   GI: No ABD mass or tenderness, no HSM  : Not tested  Lymph: No cervical, supraclavicular, inguinal or epitrochlear nodes  MS: The TMJ, neck, shoulder, elbow, wrist, MCP/PIP/DIP, spine, hip, knee, ankle, and foot MTP/IP joints were examined and found normal. No active synovitis or altered joint anatomy. Full joint ROM. Normal  strength. No dactylitis,  tenosynovitis, enthesopathy.  Skin: No nail pitting,  alopecia, rash, nodules or lesions  Neuro: grossly non-focal  Psych: Normal judgement, orientation, memory, affect.    Images:  CT Chest w/o contrast (7/25/18):  Findings remain most consistent with small airway disease  and/or nonclassifiable interstitial lung disease and are not  significantly changed from the March 2017 comparison.    Per radiology.    Laboratory:   RHEUM RESULTS Latest Ref Rng & Units 6/26/2019 7/10/2019 8/8/2019   SED RATE 0 - 30 mm/h - - -   CRP, INFLAMMATION 0.0 - 8.0 mg/L - - -   CK TOTAL 30 - 225 U/L - - -   RHEUMATOID FACTOR <20 IU/mL - - -   RG SCREEN BY EIA <1.0 - - -   AST 0 - 45 U/L - - -   ALT 0 - 50 U/L - - -   ALBUMIN 3.4 - 5.0 g/dL - - -   WBC 4.0 - 11.0 10e9/L - - -   RBC 3.8 - 5.2 10e12/L - - -   HGB 11.7 - 15.7 g/dL - - -   HCT 35.0 - 47.0 % - - -   MCV 78 - 100 fl - - -   MCHC 31.5 - 36.5 g/dL - - -   RDW 10.0 - 15.0 % - - -    - 450 10e9/L - - -   CREATININE 0.52 - 1.04 mg/dL 1.08(H) 1.39(H) 1.09(H)   GFR ESTIMATE, IF BLACK >60 mL/min/[1.73:m2] 56(L) 42(L) 56(L)   GFR ESTIMATE >60 mL/min/[1.73:m2] 49(L) 36(L) 48(L)    - 1,620 mg/dL - - -       Rheumatoid Factor   Date Value Ref Range Status   07/24/2015 <20 <20 IU/mL Final   ,  ,   Cyclic Cit Pept IgG/IgA   Date Value Ref Range Status   07/24/2015 <20  Interpretation:  Negative   <20 UNITS Final   ,  ,   Scleroderma Antibody Scl-70 CECILIA IgG   Date Value Ref Range Status   07/24/2015  0.0 - 0.9 AI Final    <0.2  Negative   Antibody index (AI) values reflect qualitative changes in antibody   concentration that cannot be directly associated with clinical condition or   disease state.       SSA (Ro) (CECILIA) Antibody, IgG   Date Value Ref Range Status   07/24/2015 1.6 (H) 0.0 - 0.9 AI Final     Comment:     Positive   Antibody index (AI) values reflect qualitative changes in antibody   concentration that cannot be directly associated with clinical condition or   disease state.       SSB (La) (CECILIA) Antibody, IgG   Date  Value Ref Range Status   07/24/2015  0.0 - 0.9 AI Final    <0.2  Negative   Antibody index (AI) values reflect qualitative changes in antibody   concentration that cannot be directly associated with clinical condition or   disease state.       ,  ,   RG Screen by EIA   Date Value Ref Range Status   07/24/2015 <1.0  Interpretation:  Negative   <1.0 Final   ,  ,  ,  ,  ,  ,  ,  ,  ,  ,  ,  ,  ,  ,  ,  ,  ,  ,   Neutrophil Cytoplasmic IgG Antibody   Date Value Ref Range Status   07/24/2015   Final    <1:20  Reference range: <1:20  (Note)  The ANCA IFA is <1:20; therefore, no further testing will  be performed.  INTERPRETIVE INFORMATION: Anti-Neutrophil Cyto Ab, IgG  Neutrophil Cytoplasmic Antibodies (C-ANCA = granular  cytoplasmic staining, P-ANCA = perinuclear staining) are  found in the serum of over 90 percent of patients with  certain necrotizing systemic vasculitides, and usually in  less than 5 percent of patients with collagen vascular  disease or arthritis.  Performed by [facility],  [address] [place] [address] [phone]  [email or web address], Burke Mckeon MD, Lab. Director       ,  ,  ,  ,  ,  ,  ,   IGG   Date Value Ref Range Status   07/24/2015 1320 695 - 1620 mg/dL Final   ,  ,  ,  ,  ,   Scleroderma Antibody Scl-70 CECILIA IgG   Date Value Ref Range Status   07/24/2015  0.0 - 0.9 AI Final    <0.2  Negative   Antibody index (AI) values reflect qualitative changes in antibody   concentration that cannot be directly associated with clinical condition or   disease state.         I reviewed and edited the above scribed note to reflect my findings and plan.     I discussed the findings and recommendations with the patient.  Recommendations were discussed with the consulting team.  Time spent: 30 minutes    Carmenza Stringer MD, MS  Rheumatology Attending Physician  pgr [phone]

## 2019-08-17 ENCOUNTER — TELEPHONE (OUTPATIENT)
Dept: RHEUMATOLOGY | Facility: CLINIC | Age: 79
End: 2019-08-17

## 2019-08-17 NOTE — TELEPHONE ENCOUNTER
Prior Authorization Retail Medication Request    Medication/Dose: Evoxac 30 mg   ICD code (if different than what is on RX):    Previously Tried and Failed:    Rationale:  Dry mouth is most concerning    Insurance Name:  Medicare  Insurance ID:  [id number]       Pharmacy Information (if different than what is on RX)  Name:  [facility]  Phone:  [phone]

## 2019-08-19 LAB
ALBUMIN SERPL ELPH-MCNC: 3.8 G/DL (ref 3.7–5.1)
ALPHA1 GLOB SERPL ELPH-MCNC: 0.4 G/DL (ref 0.2–0.4)
ALPHA2 GLOB SERPL ELPH-MCNC: 1.1 G/DL (ref 0.5–0.9)
B-GLOBULIN SERPL ELPH-MCNC: 1.3 G/DL (ref 0.6–1)
C3 SERPL-MCNC: 131 MG/DL (ref 76–169)
C4 SERPL-MCNC: 31 MG/DL (ref 15–50)
DEPRECATED CALCIDIOL+CALCIFEROL SERPL-MC: 32 UG/L (ref 20–75)
GAMMA GLOB SERPL ELPH-MCNC: 1.4 G/DL (ref 0.7–1.6)
IGA SERPL-MCNC: 687 MG/DL (ref 70–380)
IGG SERPL-MCNC: 1400 MG/DL (ref 695–1620)
IGM SERPL-MCNC: 117 MG/DL (ref 60–265)
M PROTEIN SERPL ELPH-MCNC: 0 G/DL
PROT PATTERN SERPL ELPH-IMP: ABNORMAL

## 2019-08-20 ENCOUNTER — ANCILLARY PROCEDURE (OUTPATIENT)
Dept: BONE DENSITY | Facility: CLINIC | Age: 79
End: 2019-08-20
Attending: INTERNAL MEDICINE
Payer: MEDICARE

## 2019-08-20 DIAGNOSIS — J30.89 SEASONAL ALLERGIC RHINITIS DUE TO OTHER ALLERGIC TRIGGER: ICD-10-CM

## 2019-08-20 DIAGNOSIS — Z79.52 CURRENT CHRONIC USE OF SYSTEMIC STEROIDS: ICD-10-CM

## 2019-08-20 DIAGNOSIS — M85.80 OSTEOPENIA, UNSPECIFIED LOCATION: ICD-10-CM

## 2019-08-20 RX ORDER — LORATADINE 10 MG/1
TABLET ORAL
Qty: 90 TABLET | Refills: 1 | Status: SHIPPED | OUTPATIENT
Start: 2019-08-20 | End: 2019-12-19

## 2019-08-20 NOTE — TELEPHONE ENCOUNTER
CW  Routing refill request to provider for review/approval because:  Failed refill protocol: Over 64 years old.  Thanks,  Sophia Hemphill RN

## 2019-08-20 NOTE — TELEPHONE ENCOUNTER
"LORATADINE 10 MG TABLET  Last Written Prescription Date:  02/26/2019  Last Fill Quantity: 90,  # refills: 1   Last office visit: 7/10/2019 with prescribing provider:  JANA   Future Office Visit: 09/03/2019 WITH JANA  Next 5 appointments (look out 90 days)    Sep 03, 2019  1:00 PM CDT  Office Visit with Ilene Tristan MD  [facility] ([facility]) [address]  [facility] [address]  [phone]   Sep 03, 2019  2:00 PM CDT  Office Visit with Sabrina Meek [facility] ([facility]) [address]  [address]  [facility] [address]  [phone]         Requested Prescriptions   Pending Prescriptions Disp Refills     loratadine (CLARITIN) 10 MG tablet [Pharmacy Med Name: LORATADINE 10 MG TABLET] 90 tablet 1     Sig: TAKE 1 TABLET BY MOUTH EVERY DAY       Antihistamines Protocol Failed - 8/20/2019  1:14 PM        Failed - Patient is 3-64 years of age     Apply weight-based dosing for peds patients age 3 - 12 years of age.    Forward request to provider for patients under the age of 3 or over the age of 64.          Passed - Recent (12 mo) or future (30 days) visit within the authorizing provider's specialty     Patient had office visit in the last 12 months or has a visit in the next 30 days with authorizing provider or within the authorizing provider's specialty.  See \"Patient Info\" tab in inbasket, or \"Choose Columns\" in Meds & Orders section of the refill encounter.              Passed - Medication is active on med list        "

## 2019-08-21 NOTE — TELEPHONE ENCOUNTER
MEDICAL RECORDS REQUEST   [facility]  Urology Clinic  [address] [place] [address]  PHONE: [phone]  Fax: [phone]        FUTURE VISIT INFORMATION                                                   WILMER Roberts: 1940 scheduled for future visit at [facility]    APPOINTMENT INFORMATION:    Date: 10/02/19 1:30PM     Provider: Vale Nassar MD     Reason for Visit/Diagnosis: Urinary Incontinence       REFERRAL INFORMATION:    Referring provider: MARY MCMAHON    Specialty: MD     Referring providers clinic:  [facility]     Clinic contact number:  [phone]    RECORDS REQUESTED FOR VISIT                                                     NOTES  STATUS/DETAILS   OFFICE NOTE from referring provider  yes   OFFICE NOTE from other specialist  no   DISCHARGE SUMMARY from hospital  no   DISCHARGE REPORT from the ER  no   OPERATIVE REPORT  no   MEDICATION LIST  no   LABS     URINALYSIS (UA)  yes     PRE-VISIT CHECKLIST      Record collection complete Yes- All recs in epic    Appointment appropriately scheduled           (right time/right provider) Yes   MyChart activation Yes   Questionnaire complete If no, please explain: In process      Completed by: Kami Saldivar

## 2019-08-22 LAB — CRYOGLOB SER QL: ABNORMAL %

## 2019-08-22 NOTE — TELEPHONE ENCOUNTER
Central Prior Authorization Team   [phone]    PA Initiation    Medication: Evoxac 30 mg   Insurance Company: [employer] - Phone [phone] Fax [phone]  Pharmacy [facility]illing the Rx: [id number] [place] - [address]  Filling Pharmacy Phone: [phone]  Filling Pharmacy Fax: [phone]  Start Date: 8/22/2019

## 2019-08-23 ENCOUNTER — TELEPHONE (OUTPATIENT)
Dept: FAMILY MEDICINE | Facility: CLINIC | Age: 79
End: 2019-08-23

## 2019-08-23 NOTE — TELEPHONE ENCOUNTER
Prior Authorization Approval    Authorization Effective Date: 5/24/2019  Authorization Expiration Date: 8/21/2020  Medication: Evoxac 30 mg-PA APPROVED    Approved Dose/Quantity:   Reference #:     Insurance Company: [employer] - Phone [phone] Fax [phone]  Expected CoPay:       CoPay Card Available:      Foundation Assistance Needed:    Which Pharmacy is filling the prescription (Not needed for infusion/clinic administered): [id number] - [place], [place] - [address]  Pharmacy Notified: Yes- **Instructed pharmacy to notify patient when script is ready to /ship.**  Patient Notified: Yes

## 2019-08-23 NOTE — TELEPHONE ENCOUNTER
Reason for call:  Patient reporting a symptom    Symptom or request: right hand unable to move thumb     Duration (how long have symptoms been present): 3weeks ago    Have you been treated for this before? Yes    Additional comments: pt had xray about 3weeks ago and pt is still having trouble with pain and would like the nurse to call her back    Phone Number patient can be reached at:  Cell number on file:    Telephone Information:   Mobile [phone]       Best Time:  anytime    Can we leave a detailed message on this number:  YES    Call taken on 8/23/2019 at 10:21 AM by Chelsie Elizabeth

## 2019-08-23 NOTE — TELEPHONE ENCOUNTER
"CW,   Pt calling about thumb pain     [place] [employer] [place]   Fell while she was headed to the bathroom   Was in the ER 8/4/2019    Wrist xray taken in ER:  \"Negative for fracture or dislocation. Severe osteoarthrosis at the base of the thumb and the first CMC joint. Normal soft tissues.\"    Thumb pain is really hurting her  States focus was on her wrist at the ER  Wonders if she has broken thumb  No thumb pain prior to fall   Per xray, osteoarthrosis noted to thumb base    Pt unsure if full hand xray needed?  Asked her if she discussed thumb pain at rheumatology appt 8/14/2019  She states she did not   That was her first visit with new rheumatologist    Please advise if you want to see pt to further discuss/get more imaging or if best step would be to f/u with rheumatology on this  Thanks,  Lydia HALEY RN      "

## 2019-08-23 NOTE — TELEPHONE ENCOUNTER
Called and discussed with pt...  Recommended she go to [facility] and have it evaluated in their walk-in clinic - get the benefit of seeing sports med or ortho.  Concerned that her sx's are bad after a fall 3 wks ago.  Pt is wary of going in, however, states she tool tylenol and if feels a bit better.  Still recommended [facility].  Pt will strongly consider.  CW

## 2019-08-26 DIAGNOSIS — E78.5 HYPERLIPIDEMIA LDL GOAL <100: ICD-10-CM

## 2019-08-26 DIAGNOSIS — E11.9 TYPE 2 DIABETES MELLITUS WITHOUT COMPLICATION, WITHOUT LONG-TERM CURRENT USE OF INSULIN (H): ICD-10-CM

## 2019-08-26 NOTE — TELEPHONE ENCOUNTER
ATORVASTATIN 10 MG TABLET  Last Written Prescription Date:  05/08/2019  Last Fill Quantity: 90,  # refills: 0   Last office visit: 7/10/2019 with prescribing provider:  JANA   Future Office Visit: 09/03/2019 WITH   Next 5 appointments (look out 90 days)    Sep 03, 2019  1:00 PM CDT  Office Visit with Ilene Tristan MD  [facility] ([facility]) [address]  [facility] [address]  [phone]   Sep 03, 2019  2:00 PM CDT  Office Visit with Sabrina Meek [facility] ([facility]) [address]  [address]  [facility] [address]  [phone]       OZEMPIC 0.25-0.5 MG DOSE PEN  Last Written Prescription Date:  07/11/2019  Last Fill Quantity: 1.5ML,  # refills: 0   Last office visit: 7/10/2019 with prescribing provider:  JANA   Future Office Visit: 09/03/2019 WITH   Next 5 appointments (look out 90 days)    Sep 03, 2019  1:00 PM CDT  Office Visit with Ilene Tristan MD  [facility] ([facility]) [address]  [facility] [address]  [phone]   Sep 03, 2019  2:00 PM CDT  Office Visit with Sabrina Meek [facility] ([facility]) [address]  [address]  [facility] [address] [phone]         Requested Prescriptions   Pending Prescriptions Disp Refills     atorvastatin (LIPITOR) 10 MG tablet [Pharmacy Med Name: ATORVASTATIN 10 MG TABLET] 90 tablet 0     Sig: TAKE 1 TABLET BY MOUTH EVERY DAY       Statins Protocol Passed - 8/26/2019 12:16 PM        Passed - LDL on file in past 12 months     Recent Labs   Lab Test 04/09/19  1017   LDL 29             Passed - No abnormal creatine kinase in past 12 months     Recent Labs   Lab Test 07/24/15  1236   CKT 57                Passed - Recent (12 mo) or future (30 days) visit within the authorizing provider's specialty     Patient had office visit in the last 12 months or has  "a visit in the next 30 days with authorizing provider or within the authorizing provider's specialty.  See \"Patient Info\" tab in inbasket, or \"Choose Columns\" in Meds & Orders section of the refill encounter.              Passed - Medication is active on med list        Passed - Patient is age 18 or older        Passed - No active pregnancy on record        Passed - No positive pregnancy test in past 12 months        OZEMPIC 0.25 or 0.5 MG/DOSE SOPN [Pharmacy Med Name: OZEMPIC 0.25-0.5 MG DOSE PEN]  0     Sig: INJECT 0.25 MG SUBCUTANEOUS ONCE A WEEK FOR 28 DAYS, THEN 0.5 MG ONCE A WEEK FOR 28 DAYS.       There is no refill protocol information for this order        " individual instruction

## 2019-08-27 NOTE — TELEPHONE ENCOUNTER
CW  Atorvastatin:  Routing refill request to provider for review/approval because:  Drug interaction warning  Drug-Drug: ketoconazole and atorvastatin   Plasma concentrations of statins (i.e. atorvastatin, lovastatin and simvastatin) may be increased when co-administered with imidazoles (itraconazole, ketoconazole and posaconazole). Adverse effects, including myopathy and rhabdomyolysis may occur. Coadministration may be contraindicated in official package labeling.   Details    Ozempic:  Routing refill request to provider for review/approval because:  Drug not on the G refill protocol     Thanks,  Sophia Hemphill RN

## 2019-08-28 RX ORDER — SEMAGLUTIDE 1.34 MG/ML
INJECTION, SOLUTION SUBCUTANEOUS
Qty: 1.5 ML | Refills: 0 | Status: SHIPPED | OUTPATIENT
Start: 2019-08-28 | End: 2019-10-08

## 2019-08-28 RX ORDER — ATORVASTATIN CALCIUM 10 MG/1
TABLET, FILM COATED ORAL
Qty: 90 TABLET | Refills: 0 | Status: SHIPPED | OUTPATIENT
Start: 2019-08-28 | End: 2019-11-20

## 2019-08-30 DIAGNOSIS — E11.69 TYPE 2 DIABETES MELLITUS WITH OTHER SPECIFIED COMPLICATION (H): ICD-10-CM

## 2019-08-30 NOTE — TELEPHONE ENCOUNTER
NOVOLOG 100 UNIT/ML FLEXPEN  Last Written Prescription Date:  08/02/2019  Last Fill Quantity: 15ML,  # refills: 1   Last office visit: 7/10/2019 with prescribing provider:  JANA   Future Office Visit: 09/03/2019 WITH JANA  Next 5 appointments (look out 90 days)    Sep 03, 2019  1:00 PM CDT  Office Visit with Ilene Tristan MD  [facility] ([facility]) [address] [place]  [facility] [address]  [phone]   Sep 03, 2019  2:00 PM CDT  Office Visit with Sabrina Meek [facility] ([facility]) [address]  [address]  [facility] [address]  [phone]         Requested Prescriptions   Pending Prescriptions Disp Refills     NOVOLOG FLEXPEN 100 UNIT/ML soln [Pharmacy Med Name: NOVOLOG 100 UNIT/ML FLEXPEN]  1     Sig: INJECT 12 UNITS SUBCUTANEOUS 3 TIMES DAILY (WITH MEALS)       Short Acting Insulin Protocol Passed - 8/30/2019 10:33 AM        Passed - Blood pressure less than 140/90 in past 6 months     BP Readings from Last 3 Encounters:   08/16/19 104/69   08/08/19 108/58   08/04/19 138/75                 Passed - LDL on file in past 12 months     Recent Labs   Lab Test 04/09/19  1017   LDL 29             Passed - Microalbumin on file in past 12 months     Recent Labs   Lab Test 04/09/19  1017   MICROL 28   UMALCR 50.98*             Passed - Serum creatinine on file in past 12 months     Recent Labs   Lab Test 08/16/19  1334  04/02/17  2213   CR 1.05*   < >  --    CREAT  --   --  0.6    < > = values in this interval not displayed.             Passed - HgbA1C in past 3 or 6 months     If HgbA1C is 8 or greater, it needs to be on file within the past 3 months.  If less than 8, must be on file within the past 6 months.     Recent Labs   Lab Test 04/09/19  1017   A1C 7.2*             Passed - Medication is active on med list        Passed - Patient is age 18 or older        Passed - Recent (6 mo) or future (30 days) visit within  "the authorizing provider's specialty     Patient had office visit in the last 6 months or has a visit in the next 30 days with authorizing provider or within the authorizing provider's specialty.  See \"Patient Info\" tab in inbasket, or \"Choose Columns\" in Meds & Orders section of the refill encounter.            "

## 2019-09-03 ENCOUNTER — OFFICE VISIT (OUTPATIENT)
Dept: PHARMACY | Facility: CLINIC | Age: 79
End: 2019-09-03
Payer: COMMERCIAL

## 2019-09-03 ENCOUNTER — OFFICE VISIT (OUTPATIENT)
Dept: FAMILY MEDICINE | Facility: CLINIC | Age: 79
End: 2019-09-03
Payer: MEDICARE

## 2019-09-03 VITALS
SYSTOLIC BLOOD PRESSURE: 126 MMHG | HEART RATE: 89 BPM | BODY MASS INDEX: 32.32 KG/M2 | OXYGEN SATURATION: 97 % | RESPIRATION RATE: 16 BRPM | WEIGHT: 201.1 LBS | DIASTOLIC BLOOD PRESSURE: 65 MMHG | HEIGHT: 66 IN

## 2019-09-03 DIAGNOSIS — E11.65 TYPE 2 DIABETES MELLITUS WITH HYPERGLYCEMIA, WITH LONG-TERM CURRENT USE OF INSULIN (H): ICD-10-CM

## 2019-09-03 DIAGNOSIS — K59.00 CONSTIPATION, UNSPECIFIED CONSTIPATION TYPE: ICD-10-CM

## 2019-09-03 DIAGNOSIS — Z79.4 TYPE 2 DIABETES MELLITUS WITH HYPERGLYCEMIA, WITH LONG-TERM CURRENT USE OF INSULIN (H): Primary | ICD-10-CM

## 2019-09-03 DIAGNOSIS — E11.65 TYPE 2 DIABETES MELLITUS WITH HYPERGLYCEMIA, WITH LONG-TERM CURRENT USE OF INSULIN (H): Primary | ICD-10-CM

## 2019-09-03 DIAGNOSIS — M25.541 METACARPOPHALANGEAL JOINT PAIN, RIGHT: ICD-10-CM
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
## 2019-09-03 DIAGNOSIS — N18.30 CKD (CHRONIC KIDNEY DISEASE) STAGE 3, GFR 30-59 ML/MIN (H): ICD-10-CM

## 2019-09-03 DIAGNOSIS — Z79.4 TYPE 2 DIABETES MELLITUS WITH HYPERGLYCEMIA, WITH LONG-TERM CURRENT USE OF INSULIN (H): ICD-10-CM

## 2019-09-03 DIAGNOSIS — F33.1 MAJOR DEPRESSIVE DISORDER, RECURRENT EPISODE, MODERATE (H): ICD-10-CM

## 2019-09-03 DIAGNOSIS — G47.01 INSOMNIA DUE TO MEDICAL CONDITION: ICD-10-CM

## 2019-09-03 DIAGNOSIS — I10 ESSENTIAL HYPERTENSION WITH GOAL BLOOD PRESSURE LESS THAN 140/90: ICD-10-CM

## 2019-09-03 PROCEDURE — 80048 BASIC METABOLIC PNL TOTAL CA: CPT | Performed by: FAMILY MEDICINE

## 2019-09-03 PROCEDURE — 99606 MTMS BY PHARM EST 15 MIN: CPT | Performed by: PHARMACIST

## 2019-09-03 PROCEDURE — 99607 MTMS BY PHARM ADDL 15 MIN: CPT | Performed by: PHARMACIST

## 2019-09-03 PROCEDURE — 36415 COLL VENOUS BLD VENIPUNCTURE: CPT | Performed by: FAMILY MEDICINE

## 2019-09-03 PROCEDURE — 99214 OFFICE O/P EST MOD 30 MIN: CPT | Performed by: FAMILY MEDICINE

## 2019-09-03 RX ORDER — INSULIN GLARGINE 100 [IU]/ML
22 INJECTION, SOLUTION SUBCUTANEOUS DAILY
Qty: 15 ML | Refills: 1 | Status: SHIPPED | OUTPATIENT
Start: 2019-09-03 | End: 2019-10-22

## 2019-09-03 RX ORDER — TRAZODONE HYDROCHLORIDE 50 MG/1
25-75 TABLET, FILM COATED ORAL
Qty: 90 TABLET | Refills: 1 | Status: SHIPPED | OUTPATIENT
Start: 2019-09-03 | End: 2019-11-23

## 2019-09-03 RX ORDER — INSULIN ASPART 100 [IU]/ML
INJECTION, SOLUTION INTRAVENOUS; SUBCUTANEOUS
OUTPATIENT
Start: 2019-09-03

## 2019-09-03 ASSESSMENT — MIFFLIN-ST. JEOR: SCORE: 1395.99

## 2019-09-03 NOTE — NURSING NOTE
"Chief Complaint   Patient presents with     RECHECK     Diabetic Follow Up     /65   Pulse 89   Resp 16   Ht 1.664 m (5' 5.5\")   Wt 91.2 kg (201 lb 1.6 oz)   SpO2 97%   BMI 32.96 kg/m   Estimated body mass index is 32.96 kg/m  as calculated from the following:    Height as of this encounter: 1.664 m (5' 5.5\").    Weight as of this encounter: 91.2 kg (201 lb 1.6 oz).  Medication Reconciliation: complete        Health Maintenance Due Pending Provider Review:  NONE    n/a    Jacki Bai MA  [facility]  [phone]  "

## 2019-09-03 NOTE — PROGRESS NOTES
Subjective   Betty Tee is a 79 year old female who presents to clinic today for the following health issues:    HPI   Diabetes Follow-up - metformin 1000mg at night, basaglar 25 units at breakfast.  Ozempic started 7/22/19, 8/26/19 increased dose.  Has been lowering novolog dose as ozempic has increased.    How often are you checking your blood sugar? Three times daily    What time of day are you checking your blood sugars (select all that apply)?  Before and after meals    Have you had any blood sugars above 200?  Yes     Have you had any blood sugars below 70?  No    What symptoms do you notice when your blood sugar is low?  Shaky and Dizzy    What concerns do you have today about your diabetes?  Other:      Do you have any of these symptoms? (Select all that apply)  Burning in feet     Have you had a diabetic eye exam in the last 12 months? Yes- Date of last eye exam: ?    Med review-   metformin 1000mg at night, basaglar 25 units at breakfast.  Ozempic started 7/22/19, 8/26/19 increased dose.  Has been lowering novolog dose as ozempic has increased.    A1C last in 4/9/19, at 7.2.  Jardiance with GFR issues, so stopped, switched to ozempic.  Se's? Constipation periodic, but on the ozempic it's been much worse.  Has to strain to get stools out.  Can go 3-4 days without stools, and then it's hard and then has loose stools.    She's lost ~9 lbs since going on it.   Appetite is lower.      ---CKD- GFR's ranged from 60-90s for past few yrs, with dip to 39-40 with torsemide in ~3/18.  Then improved again to 50's, mostly 60s, and then worsened again with Jardiance in 6/19- GFR went down to 49, and then 36 on recheck within a few weeks of starting it.  Stopped it in <1mo, and since then GFR has been 48 & 50.      ---MDD- lots of stressful situations, and dealing with them.  Would like to continue the lexapro 20mg/d.  Insomina- trazodone- has been taking a full tab lately- up from a half tab.  Sleep still isn't  great, but better on the full tab.      ---Rheumatology- recommended seeing urology- referral given.  Sx's of urinary urgency, bladder incontinence.    ---Pain in R 1st MCP joint- didn't go see ortho.  Will try and go.      ---Fall at [employer] on 8/4/19, in [facility], going up the steps.  Braced herself with her left hand, partially fell.  Had bruise on her left breast.  On a Friday, but Sat, she couldn't sleep due to the pain.  On Sun, went to ER.  Pain is not getting a bit better over time.  Still swollen, but that is getting better.        BP Readings from Last 2 Encounters:   10/02/19 139/70   09/03/19 126/65     Hemoglobin A1C (%)   Date Value   04/09/2019 7.2 (H)   10/02/2018 6.7 (H)     LDL Cholesterol Calculated (mg/dL)   Date Value   04/09/2019 29   05/23/2018 21       Diabetes Management Resources    How many servings of fruits and vegetables do you eat daily?  2-3    On average, how many sweetened beverages do you drink each day (soda, juice, sweet tea, etc)?   0  How many days per week do you miss taking your medication? 1    What makes it hard for you to take your medications?  remembering to take        Patient Active Problem List   Diagnosis     Temporomandibular joint disorder     Diverticulitis of colon     Disorder of bone and cartilage     Osteoarthritis     Alcohol abuse, in remission     Restless legs syndrome (RLS)     Major depressive disorder, recurrent episode, moderate (H)     Essential hypertension with goal blood pressure less than 140/90     Advanced directives, counseling/discussion     Colouterine fistula     Permanent atrial fibrillation     Left ventricular hypertrophy     Health Care Home     Insomnia     Joint pains     Allergic reaction caused by a drug - likely plaquenil     Breast fibroadenoma     DVT (deep venous thrombosis) (H)     Fatty liver disease, nonalcoholic     Rib pain     Neck mass     Gastroesophageal reflux disease without esophagitis     Enlarged lymph node      Sjogren's syndrome with lung involvement (H)     ANGELICA (obstructive sleep apnea)     ILD (interstitial lung disease) (H)     Lower GI bleed     Acute and chronic respiratory failure with hypoxia (H)     (HFpEF) heart failure with preserved ejection fraction (H)     Type 2 diabetes mellitus with hyperglycemia, with long-term current use of insulin (H)     Heart failure, chronic, with acute decompensation (H)     Hyperlipidemia LDL goal <100     Long term current use of anticoagulant therapy     Inflammatory arthritis     Follicular bronchiolitis (H)     CKD (chronic kidney disease) stage 3, GFR 30-59 ml/min (H)     Closed head injury     Warfarin-induced coagulopathy (H)     Urge incontinence of urine     Urinary urgency     Past Surgical History:   Procedure Laterality Date     BACK SURGERY  1962     BIOPSY BREAST  9/27/02    Biopsy Left Breast     C APPENDECTOMY  1970's?     C NONSPECIFIC PROCEDURE  11/05    exploratory abd lap, adhesions, resolved RLQ pain, diverticulitis episodes     CARDIAC SURGERY       CHOLECYSTECTOMY  1990's?     COLECTOMY LEFT  11/7/2011    Procedure:COLECTOMY LEFT; Laparoscopic mobilization of splenic flexture, sigmoid colectomy, coloprotoscopy, loop illeostomy; Surgeon:CK CASTANEDA; Location:[facility] OR     HYSTERECTOMY TOTAL ABDOMINAL, BILATERAL SALPINGO-OOPHORECTOMY, COMBINED  11/7/2011    Procedure:COMBINED HYSTERECTOMY TOTAL ABDOMINAL, BILATERAL SALPINGO-OOPHORECTOMY; total abdominal hysterectomy, bilateral salpingo-oophorectomy; Surgeon:ALETA MANUEL; Location:[facility] OR     INSERT STENT URETER  11/7/2011    Procedure:INSERT STENT URETER; Placement of Bilateral Ureteral Stents ; Surgeon:PRANEETH BRYANT; Location:[facility] OR     SIGMOIDOSCOPY FLEXIBLE  11/3/2011    Procedure:SIGMOIDOSCOPY FLEXIBLE; Flexible Sigmoidoscopy; Surgeon:CK CASTANEDA; Location:[facility] OR     TAKEDOWN ILEOSTOMY  2/1/2012    Procedure:TAKEDOWN ILEOSTOMY; Takedown Loop Ileostomy ; Surgeon:CK CASTANEDA; Location:[facility]  OR       Social History     Tobacco Use     Smoking status: Former Smoker     Packs/day: 0.50     Years: 10.00     Pack years: 5.00     Types: Cigarettes     Last attempt to quit: 2011     Years since quittin.2     Smokeless tobacco: Never Used     Tobacco comment:  ppd   Substance Use Topics     Alcohol use: No     Alcohol/week: 0.0 standard drinks     Comment: In recovery beginning      Family History   Problem Relation Age of Onset     C.A.D. Mother 63        MI- first at age 63     Heart Disease Mother      Hypertension Mother      Cerebrovascular Disease Mother      Hyperlipidemia Mother      Alcohol/Drug Father      Alzheimer Disease Father      Dementia Father      Hypertension Father      Hyperlipidemia Father      Diabetes Sister      C.A.D. Sister 52        Minor MI- age 50's     Heart Disease Sister      Hypertension Sister      Hypertension Sister      Hypertension Brother      Cancer - colorectal Sister 48        Late 40's early 50's     Prostate Cancer Brother 74        Dx'd age 74     Gastrointestinal Disease Sister         Diverticulitis     Gastrointestinal Disease Brother         Diverticulitis     Lipids Sister      Lipids Sister      Parkinsonism Brother      Diabetes Sister      Heart Disease Sister         CHF     Cancer Sister         lung, smoker     Substance Abuse Sister      Substance Abuse Brother      Asthma Sister      Cancer Sister      Breast Cancer Daughter      Prostate Cancer Brother      Hyperlipidemia Brother      Diabetes Other      Hypertension Other          Current Outpatient Medications   Medication Sig Dispense Refill     acetaminophen (TYLENOL) 500 MG tablet Take 500 mg by mouth nightly as needed        acyclovir (ZOVIRAX) 400 MG tablet Take 1 tablet (400 mg) by mouth 3 times daily For a couple days 15 tablet 2     acyclovir (ZOVIRAX) 5 % external ointment Apply topically 6 times daily As needed for outbreaks 15 g 3     albuterol (PROAIR HFA, PROVENTIL  HFA, VENTOLIN HFA) 108 (90 BASE) MCG/ACT inhaler Inhale 2 puffs into the lungs every 6 hours 1 Inhaler 3     atorvastatin (LIPITOR) 10 MG tablet TAKE 1 TABLET BY MOUTH EVERY DAY 90 tablet 0     azithromycin (ZITHROMAX) 250 MG tablet Take 1 tablet (250 mg) by mouth daily 30 tablet 11     BD JANE U/F 32G X 4 MM insulin pen needle USE 4 DAILY AS DIRECTED. 400 each 1     blood glucose (ACCU-CHEK SHANNON PLUS) test strip USE TO TEST BLOOD SUGARS 3 TIMES DAILY 400 strip 3     blood glucose monitoring (ACCU-CHEK FASTCLIX) lancets Use to test blood sugar 4 times daily or as directed.  Ok to substitute alternative if insurance prefers. 1 Box 11     blood glucose monitoring (ACCU-CHEK MULTICLIX) lancets Use to test blood sugar 4 times daily or as directed. 4 Box 3     cevimeline (EVOXAC) 30 MG capsule Take 1 capsule (30 mg) by mouth 3 times daily 90 capsule 5     COMPRESSION STOCKINGS Wear compression stockings in affected leg (right leg) or both legs most of the time during the day and take them off at night. 2 each 2     fluticasone (FLONASE) 50 MCG/ACT nasal spray Spray 1-2 sprays into both nostrils daily as needed for allergies 1 Bottle 3     fluticasone (FLOVENT HFA) 220 MCG/ACT inhaler Inhale 2 puffs into the lungs 2 times daily 3 Inhaler 3     Humidifier MISC Continuous humidified air via concentrator.   Diagnosis: ILD  Duration: Lifetime 99. 1 each 1     hydroxychloroquine (PLAQUENIL) 200 MG tablet Take 2 tablets (400 mg) by mouth daily Annual Plaquenil toxicity eye screening required. 180 tablet 1     ketoconazole (NIZORAL) 2 % external cream Apply topically 2 times daily 60 g 1     lidocaine (LIDODERM) 5 % patch Apply patch to painful area for up to 12 h within a 24 h period.  Remove after 12 hours. 30 patch 5     loratadine (CLARITIN) 10 MG tablet TAKE 1 TABLET BY MOUTH EVERY DAY 90 tablet 1     metoprolol succinate (TOPROL XL) 25 MG 24 hr tablet Take 0.5 tablets (12.5 mg) by mouth 2 times daily Take 50 mg by mouth  in combination with 12.5 for a total of 62.5 twice a day 90 tablet 3     metoprolol succinate (TOPROL-XL) 100 MG 24 hr tablet Take 50 mg by mouth in combination with 12.5 for a total of 62.5 twice a day 90 tablet 3     montelukast (SINGULAIR) 10 MG tablet TAKE 1 TABLET BY MOUTH EVERYDAY AT BEDTIME 90 tablet 0     NOVOLOG FLEXPEN 100 UNIT/ML soln INJECT 12 UNITS SUBCUTANEOUS 3 TIMES DAILY (WITH MEALS) 15 mL 1     order for DME Please provide patient with a POC.  Okay to test patient on POC to maintain oxygen saturations above 90%.   Patient currently uses 2 to 3 LPM oxygen with activity. 1 Device 0     order for DME Equipment being ordered: Oxygen  Pulsed oxygen portable tank  Rate: 4LNC  Diagnosis: ILD  Duration: Lifetime -99 1 Device 1     order for DME Equipment being ordered: Full face mask for CPAP - size: F10 large 1 each 0     order for DME Oxygen: Patient requires supplemental Oxygen 4 LPM via nasal canula with activity. Please provide patient with a home unit and with portability capability. Oxygen will be for a lifetime. 1 Device 0     predniSONE (DELTASONE) 1 MG tablet 6/7 mg every other day x one month, 6 mg a day x one month then 5 mg a day and stay on 5 mg a day 90 tablet 1     predniSONE (DELTASONE) 5 MG tablet 6/7 mg every other day x one month, 6 mg a day x one month then 5 mg a day and stay on 5 mg a day 90 tablet 1     rivaroxaban ANTICOAGULANT (XARELTO) 20 MG TABS tablet Take 1 tablet (20 mg) by mouth daily (with dinner) 90 tablet 3     tiZANidine (ZANAFLEX) 2 MG tablet Take 1-2 tablets (2-4 mg) by mouth 3 times daily 90 tablet 1     torsemide (DEMADEX) 10 MG tablet Take one tab (10 mg) with one 20 mg tab to = 30 mg daily in afternoon. 90 tablet 3     torsemide (DEMADEX) 20 MG tablet Take 2 tabs (40 mg) in am daily 105 tablet 10     traZODone (DESYREL) 50 MG tablet Take 0.5-1.5 tablets (25-75 mg) by mouth nightly as needed for sleep 90 tablet 1     escitalopram (LEXAPRO) 20 MG tablet TAKE 1 TABLET  BY MOUTH EVERY DAY 90 tablet 0     insulin glargine (BASAGLAR KWIKPEN) 100 UNIT/ML pen INJECT 25 UNITS SUBCUTANEOUS DAILY (TO REPLACE LANTUS) 15 mL 1     insulin glargine (BASAGLAR KWIKPEN) 100 UNIT/ML pen Inject 22 Units Subcutaneous daily (to replace lantus) 15 mL 1     metFORMIN (GLUCOPHAGE-XR) 500 MG 24 hr tablet TAKE 2 TABLETS BY MOUTH DAILY WITH DINNER 180 tablet 0     potassium chloride ER (KLOR-CON) 20 MEQ CR tablet Take 2 tablets (40 mEq) by mouth every morning AND 1 tablet (20 mEq) every evening. 270 tablet 3     Semaglutide,0.25 or 0.5MG/DOS, (OZEMPIC, 0.25 OR 0.5 MG/DOSE,) 2 MG/1.5ML SOPN Inject 0.5 mg Subcutaneous once a week 4.5 mL 1     spironolactone (ALDACTONE) 25 MG tablet Take 2 tablets (50 mg) by mouth daily 180 tablet 3     vitamin D2 (ERGOCALCIFEROL) 37418 units (1250 mcg) capsule Take 1 capsule (50,000 Units) by mouth every 7 days 12 capsule 0     Allergies   Allergen Reactions     Amoxicillin-Pot Clavulanate      Augmentin Nausea and Vomiting     Codeine Nausea and Vomiting     Codeine      PN: LW Reaction: HIVES     Penicillins Nausea and Vomiting     PN: LW Reaction: GI Upset     Phenobarbital Itching     Phenobarbital      Seasonal Allergies      Recent Labs   Lab Test 09/16/19  1416 09/03/19  1437 08/16/19  1334  04/09/19  1017  10/02/18  1524 05/23/18  1028  02/21/18  1230  10/06/17  1421   A1C  --   --   --   --  7.2*  --  6.7* 6.9*  --   --    < > 7.3*   LDL  --   --   --   --  29  --   --  21  --  5  --   --    HDL  --   --   --   --  59  --   --  53  --  64  --   --    TRIG  --   --   --   --  148  --   --  113  --  188*  --   --    ALT  --   --  16  --   --   --   --  21  --   --   --  43   CR 0.99 1.20* 1.05*   < > 0.85   < >  --  0.90   < > 0.88   < > 0.94   GFRESTIMATED 54* 43* 50*   < > 65   < >  --  61   < > 62   < > 58*   GFRESTBLACK 62 50* 58*   < > 76   < >  --  74   < > 76   < > 70   POTASSIUM 4.3 3.8  --    < > 3.9   < >  --  3.5   < > 3.9   < > 4.3   TSH  --   --   --    "--  2.72  --   --  2.42  --   --    < >  --     < > = values in this interval not displayed.      BP Readings from Last 3 Encounters:   10/02/19 139/70   09/03/19 126/65   08/16/19 104/69    Wt Readings from Last 3 Encounters:   10/02/19 91.2 kg (201 lb)   09/03/19 91.2 kg (201 lb 1.6 oz)   08/16/19 94 kg (207 lb 4.8 oz)              Reviewed and updated as needed this visit by Provider  Tobacco  Allergies  Meds  Problems  Med Hx  Surg Hx  Fam Hx         Review of Systems   ROS COMP: Constitutional, HEENT, cardiovascular, pulmonary, gi and gu systems are negative, except as otherwise noted.      Objective    /65   Pulse 89   Resp 16   Ht 1.664 m (5' 5.5\")   Wt 91.2 kg (201 lb 1.6 oz)   SpO2 97%   BMI 32.96 kg/m    Body mass index is 32.96 kg/m .  Physical Exam   GENERAL APPEARANCE: healthy, alert and no distress     EYES: PERRL, sclera clear     HENT: nose and mouth without ulcers or lesions     NECK: no adenopathy, no asymmetry, masses, or scars and thyroid normal to palpation     RESP: lungs clear to auscultation - no rales, rhonchi or wheezes     CV: regular rates and rhythm, normal S1 S2, no S3 or S4 and no murmur, click or rub      Abdomen: soft, nontender, no HSM or masses and bowel sounds normal     Psych: full range affect, normal speech and grooming, judgement and insight intact     Diagnostic Test Results:  Labs reviewed in Epic  Results for orders placed or performed in visit on 09/03/19   Basic metabolic panel  (Ca, Cl, CO2, Creat, Gluc, K, Na, BUN)   Result Value Ref Range    Sodium 138 133 - 144 mmol/L    Potassium 3.8 3.4 - 5.3 mmol/L    Chloride 102 94 - 109 mmol/L    Carbon Dioxide 25 20 - 32 mmol/L    Anion Gap 11 3 - 14 mmol/L    Glucose 127 (H) 70 - 99 mg/dL    Urea Nitrogen 21 7 - 30 mg/dL    Creatinine 1.20 (H) 0.52 - 1.04 mg/dL    GFR Estimate 43 (L) >60 mL/min/[1.73_m2]    GFR Estimate If Black 50 (L) >60 mL/min/[1.73_m2]    Calcium 9.7 8.5 - 10.1 mg/dL     *Note: Due to a " large number of results and/or encounters for the requested time period, some results have not been displayed. A complete set of results can be found in Results Review.           Assessment & Plan       ICD-10-CM    1. Type 2 diabetes mellitus with hyperglycemia, with long-term current use of insulin (H) E11.65 Basic metabolic panel  (Ca, Cl, CO2, Creat, Gluc, K, Na, BUN)    Z79.4 blood glucose (ACCU-CHEK SHANNON PLUS) test strip   2. Constipation, unspecified constipation type K59.00    3. CKD (chronic kidney disease) stage 3, GFR 30-59 ml/min (H) N18.3 Basic metabolic panel  (Ca, Cl, CO2, Creat, Gluc, K, Na, BUN)     **Basic metabolic panel FUTURE anytime   4. Essential hypertension with goal blood pressure less than 140/90 I10 Basic metabolic panel  (Ca, Cl, CO2, Creat, Gluc, K, Na, BUN)   5. Major depressive disorder, recurrent episode, moderate (H) F33.1    6. Insomnia due to medical condition G47.01 traZODone (DESYREL) 50 MG tablet   7. Fall, subsequent encounter W19.XXXD ORTHO  REFERRAL   8. Metacarpophalangeal joint pain, right M25.541 ORTHO  REFERRAL     DM II- med changes as noted as above, and per MTM notes.  Should get A1C check at one of her next appts.  Pt overall doing well with the regimen, and notes a bit of a weight loss.  Some GI se's.    Constipation- possible side effect of ozempic per pt.  Rec fiber supplement daily, and miralax as needed, likely daily at first, then back off to what's needed.    CKD/HTN- good control of BP, but GFR's ranged from 60-90s for past few yrs, with dip to 39-40 with torsemide in ~3/18.  Then improved again to 50's, mostly 60s, and then worsened again with Jardiance in 6/19- GFR went down to 49, and then 36 on recheck within a few weeks of starting it.  Stopped it in <1mo, and since then GFR has been 48 & 50.  Will recheck BMP today.    MDD/Insomnia- feeling stable despite stressors on lexapro 20mg/d.  Would like to continue on the higher  dose.  Trazodone helpful for insomnia as well, though sleep is still not great- now taking full tab.  Will continue.    MCP joint pain- referral placed to hand ortho for eval/treatment.    Fall in 8/19- left rib/breast area pain/bruising.  Swelling/bruising better, still with some pain.      Return in about 6 weeks (around 10/15/2019) for Routine Visit/Chronic Cares/Med Check.  F/u rib pain, A1C recheck, possibly microalbumin.    Ilene Tristan MD  [facility]

## 2019-09-03 NOTE — PROGRESS NOTES
SUBJECTIVE/OBJECTIVE:                Betty Tee is a 79 year old female coming in for a follow-up visit for Medication Therapy Management.  She was referred to me from Dr. Tristan, whom she saw immediately prior to our visit today.     Chief Complaint: Follow up from our visit on 7/11.  Since last visit we started Jardiance, but SCr increased and it was stopped, Ozempic was then started, she is here today to follow-up on this.     Tobacco: No tobacco use  Alcohol: not currently using    Medication Adherence/Access: no issues reported    Diabetes:  Pt currently taking Basaglar 25 units, metformin ER 1000mg with dinner, Novolog 6 units TID with meals, Ozempic 1mg weekly. She is excited about the weight loss she's seen with Ozempic. Pt is having some contstipation followed by BM frequency/diarrhea - seems to cycle that she has issues every 3 days or so - right now this is tolerable and was discussed with CW - she will be trying a combination of fiber and miralax.  SMBG: 3-4 times daily. Morning, before lunch and before supper  Ranges (patient reported): 137 this morning  9/2: 130, 168, 169  9/1: 96, 217, 153  8/31: 109, 177, 139  8/30: 122, 123, 304, 261  9/29: 177, 139  8/28: 109, 146, 118  8/27: 103, 161  30-day average: 144  She reports a couple of readings over 200 but due to dietary indiscretions.    Patient is not experiencing hypoglycemia  Recent symptoms of high blood sugar? none  Aspirin: Not taking due to Xarelto treatment  Diet/Exercise: Unchanged from previous visit.     ASSESSMENT:              Current medications were reviewed today as discussed above.      Medication Adherence: no issues identified    Diabetes: Could increase Ozempic dose to see if we can stop her Novolog, but will await repeat BMP (ordered by Dr. Tristan) and to see if we can get a better handle on her GI symptoms. Since her fasting readings have been on the lower side, can decrease her basal insulin.      PLAN:                  1.  Decrease Lantus to 22 units once daily.     I spent 20 minutes with this patient today. All changes were made via collaborative practice agreement with Dr. Tristan. A copy of the visit note was provided to the patient's primary care provider.     Will follow up in 1-2 weeks on constipation issues.    The patient declined a summary of these recommendations as an after visit summary. (Insulin glargine dose change written on her home med list in lieu of an AVS at pt request).     Sabrina Meek PharmD, JAMES, BCACP  MTM Pharmacist, [facility]

## 2019-09-04 LAB
ANION GAP SERPL CALCULATED.3IONS-SCNC: 11 MMOL/L (ref 3–14)
BUN SERPL-MCNC: 21 MG/DL (ref 7–30)
CALCIUM SERPL-MCNC: 9.7 MG/DL (ref 8.5–10.1)
CHLORIDE SERPL-SCNC: 102 MMOL/L (ref 94–109)
CO2 SERPL-SCNC: 25 MMOL/L (ref 20–32)
CREAT SERPL-MCNC: 1.2 MG/DL (ref 0.52–1.04)
GFR SERPL CREATININE-BSD FRML MDRD: 43 ML/MIN/{1.73_M2}
GLUCOSE SERPL-MCNC: 127 MG/DL (ref 70–99)
POTASSIUM SERPL-SCNC: 3.8 MMOL/L (ref 3.4–5.3)
SODIUM SERPL-SCNC: 138 MMOL/L (ref 133–144)

## 2019-09-04 NOTE — TELEPHONE ENCOUNTER
"Requested Prescriptions   Pending Prescriptions Disp Refills     lisinopril (PRINIVIL/ZESTRIL) 2.5 MG tablet [Pharmacy Med Name: LISINOPRIL 2.5 MG TABLET] 90 tablet 0    Last Written Prescription Date:  4/16/2018  Last Fill Quantity: 90,  # refills: 0   Last office visit: No previous visit found with prescribing provider:  Kun Geiger Office Visit:   Next 5 appointments (look out 90 days)     Aug 17, 2018  1:00 PM CDT   Office Visit with Ilene Tristan MD   [facility] ([facility])    [address] [address]   [phone]                      ACE Inhibitors (Including Combos) Protocol Passed    7/22/2018  6:46 PM       Passed - Blood pressure under 140/90 in past 12 months    BP Readings from Last 3 Encounters:   07/12/18 107/66   07/06/18 114/73   06/15/18 99/68                Passed - Recent (12 mo) or future (30 days) visit within the authorizing provider's specialty    Patient had office visit in the last 12 months or has a visit in the next 30 days with authorizing provider or within the authorizing provider's specialty.  See \"Patient Info\" tab in inbasket, or \"Choose Columns\" in Meds & Orders section of the refill encounter.           Passed - Patient is age 18 or older       Passed - No active pregnancy on record       Passed - Normal serum creatinine on file in past 12 months    Recent Labs   Lab Test  05/23/18   1028   CR  0.90            Passed - Normal serum potassium on file in past 12 months    Recent Labs   Lab Test  05/23/18   1028   POTASSIUM  3.5            Passed - No positive pregnancy test in past 12 months        montelukast (SINGULAIR) 10 MG tablet [Pharmacy Med Name: MONTELUKAST SOD 10 MG TABLET] 90 tablet 1    Last Written Prescription Date:  1/16/2018  Last Fill Quantity: 90,  # refills: 1   Last office visit: No previous visit found with prescribing provider:    Kun Geiger Office Visit:   Next 5 appointments (look out 90 " "days)     Aug 17, 2018  1:00 PM CDT   Office Visit with Ilene Tristan MD   [facility] ([facility])    [address]  [facility] [address] [phone]                  Sig: TAKE 1 TABLET BY MOUTH AT BEDTIME    Leukotriene Inhibitors Protocol Passed    7/22/2018  6:46 PM       Passed - Patient is age 12 or older    If patient is under 16, ok to refill using age based dosing.          Passed - Recent (12 mo) or future (30 days) visit within the authorizing provider's specialty    Patient had office visit in the last 12 months or has a visit in the next 30 days with authorizing provider or within the authorizing provider's specialty.  See \"Patient Info\" tab in inbasket, or \"Choose Columns\" in Meds & Orders section of the refill encounter.            Filled per [facility] protocol.     Beatriz Johnson RN  [employer] [facility]      " none

## 2019-09-05 DIAGNOSIS — E55.9 VITAMIN D DEFICIENCY: Primary | ICD-10-CM

## 2019-09-05 RX ORDER — ERGOCALCIFEROL 1.25 MG/1
50000 CAPSULE, LIQUID FILLED ORAL
Qty: 12 CAPSULE | Refills: 0 | Status: SHIPPED | OUTPATIENT
Start: 2019-09-05 | End: 2019-11-27 | Stop reason: ALTCHOICE

## 2019-09-06 NOTE — RESULT ENCOUNTER NOTE
Labs are stable except high CRP inflammation  (could re-check at next visit) and low vit D. Is your chest wall pain better?

## 2019-09-09 DIAGNOSIS — E11.9 TYPE 2 DIABETES MELLITUS WITHOUT COMPLICATION, WITHOUT LONG-TERM CURRENT USE OF INSULIN (H): ICD-10-CM

## 2019-09-09 RX ORDER — METFORMIN HCL 500 MG
TABLET, EXTENDED RELEASE 24 HR ORAL
Qty: 180 TABLET | Refills: 0 | Status: SHIPPED | OUTPATIENT
Start: 2019-09-09 | End: 2019-10-22 | Stop reason: SINTOL

## 2019-09-09 NOTE — TELEPHONE ENCOUNTER
"Prescription approved per [facility] Refill Protocol.  Sophia Hemphill RN    Requested Prescriptions   Pending Prescriptions Disp Refills     metFORMIN (GLUCOPHAGE-XR) 500 MG 24 hr tablet [Pharmacy Med Name: METFORMIN HCL  MG TABLET] 180 tablet 0     Sig: TAKE 2 TABLETS BY MOUTH DAILY WITH DINNER       Biguanide Agents Passed - 9/9/2019  1:48 AM        Passed - Blood pressure less than 140/90 in past 6 months     BP Readings from Last 3 Encounters:   09/03/19 126/65   08/16/19 104/69   08/08/19 108/58                 Passed - Patient has documented LDL within the past 12 mos.     Recent Labs   Lab Test 04/09/19  1017   LDL 29             Passed - Patient has had a Microalbumin in the past 15 mos.     Recent Labs   Lab Test 04/09/19  1017   MICROL 28   UMALCR 50.98*             Passed - Patient is age 10 or older        Passed - Patient has documented A1c within the specified period of time.     If HgbA1C is 8 or greater, it needs to be on file within the past 3 months.  If less than 8, must be on file within the past 6 months.     Recent Labs   Lab Test 04/09/19  1017   A1C 7.2*             Passed - Patient's CR is NOT>1.4 OR Patient's EGFR is NOT<45 within past 12 mos.     Recent Labs   Lab Test 09/03/19  1437   GFRESTIMATED 43*   GFRESTBLACK 50*       Recent Labs   Lab Test 09/03/19  1437   CR 1.20*             Passed - Patient does NOT have a diagnosis of CHF.        Passed - Medication is active on med list        Passed - Patient is not pregnant        Passed - Patient has not had a positive pregnancy test within the past 12 mos.         Passed - Recent (6 mo) or future (30 days) visit within the authorizing provider's specialty     Patient had office visit in the last 6 months or has a visit in the next 30 days with authorizing provider or within the authorizing provider's specialty.  See \"Patient Info\" tab in inbasket, or \"Choose Columns\" in Meds & Orders section of the refill encounter.            "

## 2019-09-13 DIAGNOSIS — I50.32 CHRONIC DIASTOLIC CONGESTIVE HEART FAILURE (H): ICD-10-CM

## 2019-09-13 DIAGNOSIS — I50.30 (HFPEF) HEART FAILURE WITH PRESERVED EJECTION FRACTION (H): ICD-10-CM

## 2019-09-16 DIAGNOSIS — N18.30 CKD (CHRONIC KIDNEY DISEASE) STAGE 3, GFR 30-59 ML/MIN (H): ICD-10-CM

## 2019-09-16 PROCEDURE — 80048 BASIC METABOLIC PNL TOTAL CA: CPT | Performed by: FAMILY MEDICINE

## 2019-09-16 PROCEDURE — 36415 COLL VENOUS BLD VENIPUNCTURE: CPT | Performed by: FAMILY MEDICINE

## 2019-09-17 LAB
ANION GAP SERPL CALCULATED.3IONS-SCNC: 8 MMOL/L (ref 3–14)
BUN SERPL-MCNC: 21 MG/DL (ref 7–30)
CALCIUM SERPL-MCNC: 9.1 MG/DL (ref 8.5–10.1)
CHLORIDE SERPL-SCNC: 105 MMOL/L (ref 94–109)
CO2 SERPL-SCNC: 24 MMOL/L (ref 20–32)
CREAT SERPL-MCNC: 0.99 MG/DL (ref 0.52–1.04)
GFR SERPL CREATININE-BSD FRML MDRD: 54 ML/MIN/{1.73_M2}
GLUCOSE SERPL-MCNC: 139 MG/DL (ref 70–99)
POTASSIUM SERPL-SCNC: 4.3 MMOL/L (ref 3.4–5.3)
SODIUM SERPL-SCNC: 137 MMOL/L (ref 133–144)

## 2019-09-17 NOTE — TELEPHONE ENCOUNTER
spironolactone (ALDACTONE) 25 MG   Last Written Prescription Date:  9/17/18  Last Fill Quantity: 180,   # refills: 3     potassium chloride SA 20 MEQ CR   Last Written Prescription Date:  9/17/18  Last Fill Quantity: 270,   # refills: 3  Routing refill request to provider for review/approval because:  MED OVER RIDE

## 2019-09-18 NOTE — RESULT ENCOUNTER NOTE
Here are your lab results from your recent visit...  -Your basic metabolic panel (which includes electrolyte levels, blood sugar level and kidney function tests) looks better this time- your GFR is up to 54, the highest it's been in a few months.  If you've been continuing to take the ozempic as we discussed after your last labs came back, that's great- continue to take it.  If you increased your water/fluid intake, keep it up.     Please let me know if you have any questions or concerns...  Best,   Lev Tristan MD

## 2019-09-19 ENCOUNTER — TELEPHONE (OUTPATIENT)
Dept: PHARMACY | Facility: CLINIC | Age: 79
End: 2019-09-19

## 2019-09-19 NOTE — TELEPHONE ENCOUNTER
Called pt to f/u on last MTM visit. No answer, message left.   Marcos NicoleD, JAMES, BCACP  MTM Pharmacist, [facility]

## 2019-09-20 RX ORDER — POTASSIUM CHLORIDE 1500 MG/1
TABLET, EXTENDED RELEASE ORAL
Qty: 270 TABLET | Refills: 3 | Status: SHIPPED | OUTPATIENT
Start: 2019-09-20 | End: 2020-08-31

## 2019-09-20 RX ORDER — SPIRONOLACTONE 25 MG/1
50 TABLET ORAL DAILY
Qty: 180 TABLET | Refills: 3 | Status: SHIPPED | OUTPATIENT
Start: 2019-09-20 | End: 2020-08-31

## 2019-09-20 NOTE — TELEPHONE ENCOUNTER
Overridden by Juana Chacon APRN CNS on Sep 17, 2018 3:43 PM   Drug-Drug   1. POTASSIUM-SPARING DIURETICS / POTASSIUM PREPARATIONS [Level: Major]   Other Orders: potassium chloride SA (K-DUR/KLOR-CON M) 20 MEQ CR tablet      2. POTASSIUM-SPARING DIURETICS / POTASSIUM PREPARATIONS [Level: Major]   Other Orders: potassium chloride SA (K-DUR/KLOR-CON M) 20 MEQ CR tablet

## 2019-09-20 NOTE — TELEPHONE ENCOUNTER
Health Call Center    Phone Message    May a detailed message be left on voicemail: yes    Reason for Call: Medication Refill Request    Has the patient contacted the pharmacy for the refill? Yes   Name of medication being requested: spironolactone   Provider who prescribed the medication: Dr. Carmona  Pharmacy: [facility]  Date medication is needed: TODAY         Action Taken: Message routed to:  [facility] ([facility] cardiology

## 2019-09-21 DIAGNOSIS — F33.1 MAJOR DEPRESSIVE DISORDER, RECURRENT EPISODE, MODERATE (H): ICD-10-CM

## 2019-09-21 NOTE — TELEPHONE ENCOUNTER
"Requested Prescriptions   Pending Prescriptions Disp Refills     escitalopram (LEXAPRO) 20 MG tablet [Pharmacy Med Name: ESCITALOPRAM 20 MG TABLET]  Last Written Prescription Date:  04/16/2019  Last Fill Quantity: 90 tablet,  # refills: 0   Last Office Visit: 9/3/2019   Future Office Visit:    Next 5 appointments (look out 90 days)    Oct 22, 2019 12:45 PM CDT  Office Visit with Ilene Tristan MD  [facility] ([facility]) [address] [place]  [facility] [address]  [phone]   Oct 22, 2019  1:30 PM CDT  SHORT with Sabrina Meek RPH  [facility] ([facility]) [address]  [address]  [facility] [address]  [phone]       90 tablet 0     Sig: TAKE 1 TABLET BY MOUTH EVERY DAY       SSRIs Protocol Failed - 9/21/2019  9:12 AM        Failed - PHQ-9 score less than 5 in past 6 months     Please review last PHQ-9 score.   PHQ-9 SCORE 8/17/2018 10/2/2018 4/16/2019   PHQ-9 Total Score - - -   PHQ-9 Total Score MyChart - - -   PHQ-9 Total Score 3 4 5     DELORES-7 SCORE 8/17/2018 10/2/2018 4/16/2019   Total Score 2 4 2           Passed - Medication is active on med list        Passed - Patient is age 18 or older        Passed - No active pregnancy on record        Passed - No positive pregnancy test in last 12 months        Passed - Recent (6 mo) or future (30 days) visit within the authorizing provider's specialty     Patient had office visit in the last 6 months or has a visit in the next 30 days with authorizing provider or within the authorizing provider's specialty.  See \"Patient Info\" tab in inbasket, or \"Choose Columns\" in Meds & Orders section of the refill encounter.              "

## 2019-09-23 RX ORDER — ESCITALOPRAM OXALATE 20 MG/1
TABLET ORAL
Qty: 90 TABLET | Refills: 0 | Status: SHIPPED | OUTPATIENT
Start: 2019-09-23 | End: 2019-12-19

## 2019-09-23 NOTE — TELEPHONE ENCOUNTER
Prescription approved per [facility] Refill Protocol.  Lydia HALEY RN

## 2019-09-24 ENCOUNTER — PRE VISIT (OUTPATIENT)
Dept: UROLOGY | Facility: CLINIC | Age: 79
End: 2019-09-24

## 2019-09-24 NOTE — TELEPHONE ENCOUNTER
Reason for Visit: Consult    Diagnosis: unspecified urinary incontinence    Orders/Procedures/Records: in system    Contact Patient: n/a    Rooming Requirements: Assess which type of incontinence patient is experiencing.   If urge or mixed incontinence- UA dip / PVR.  If stress incontinence- No urine.        Shahla Vaughn LPN  09/24/19  11:50 AM

## 2019-09-25 DIAGNOSIS — F33.1 MAJOR DEPRESSIVE DISORDER, RECURRENT EPISODE, MODERATE (H): ICD-10-CM

## 2019-09-25 NOTE — TELEPHONE ENCOUNTER
"ESCITALOPRAM 20 MG TABLET  Last Written Prescription Date:  09/23/2019  Last Fill Quantity: 90,  # refills: 0   Last office visit: 9/3/2019 with prescribing provider:  JANA   Future Office Visit: 10/22/2019 WITH JANA  Next 5 appointments (look out 90 days)    Oct 22, 2019 12:45 PM CDT  Office Visit with Ilene Tristan MD  [facility] ([facility]) [address] [place]  [facility] [address]  [phone]   Oct 22, 2019  1:30 PM CDT  SHORT with Sabrina Meek [facility] ([facility]) [address]  [address]  [facility] [id number] [phone]         Requested Prescriptions   Pending Prescriptions Disp Refills     escitalopram (LEXAPRO) 20 MG tablet [Pharmacy Med Name: ESCITALOPRAM 20 MG TABLET] 90 tablet 0     Sig: TAKE 1 TABLET BY MOUTH EVERY DAY       SSRIs Protocol Failed - 9/25/2019  1:59 PM        Failed - PHQ-9 score less than 5 in past 6 months     Please review last PHQ-9 score.           Passed - Medication is active on med list        Passed - Patient is age 18 or older        Passed - No active pregnancy on record        Passed - No positive pregnancy test in last 12 months        Passed - Recent (6 mo) or future (30 days) visit within the authorizing provider's specialty     Patient had office visit in the last 6 months or has a visit in the next 30 days with authorizing provider or within the authorizing provider's specialty.  See \"Patient Info\" tab in inbasket, or \"Choose Columns\" in Meds & Orders section of the refill encounter.            "

## 2019-09-26 RX ORDER — ESCITALOPRAM OXALATE 20 MG/1
TABLET ORAL
Start: 2019-09-26

## 2019-09-28 ENCOUNTER — HEALTH MAINTENANCE LETTER (OUTPATIENT)
Age: 79
End: 2019-09-28

## 2019-09-30 DIAGNOSIS — E11.65 TYPE 2 DIABETES MELLITUS WITH HYPERGLYCEMIA, WITH LONG-TERM CURRENT USE OF INSULIN (H): ICD-10-CM

## 2019-09-30 DIAGNOSIS — Z79.4 TYPE 2 DIABETES MELLITUS WITH HYPERGLYCEMIA, WITH LONG-TERM CURRENT USE OF INSULIN (H): ICD-10-CM

## 2019-09-30 RX ORDER — INSULIN GLARGINE 100 [IU]/ML
25 INJECTION, SOLUTION SUBCUTANEOUS DAILY
Qty: 15 ML | Refills: 1 | Status: SHIPPED | OUTPATIENT
Start: 2019-09-30 | End: 2020-05-13

## 2019-09-30 NOTE — TELEPHONE ENCOUNTER
Prescription approved per [facility] Refill Protocol.  Lydia HALEY RN    Last Written Prescription Date:  9/3/2019  Last Fill Quantity: 15,  # refills: 1   Last office visit: 9/3/2019 with prescribing provider:     Future Office Visit:   Next 5 appointments (look out 90 days)    Oct 22, 2019 12:45 PM CDT  Office Visit with Ilene Tristan MD  [facility] ([facility]) [address] [place]  [facility] [address]  [phone]   Oct 22, 2019  1:30 PM CDT  SHORT with Sabrina Meek [facility] ([facility]) [address]  [address]  [facility] [address] [phone]         Requested Prescriptions   Pending Prescriptions Disp Refills     insulin glargine (BASAGLAR KWIKPEN) 100 UNIT/ML pen [Pharmacy Med Name: BASAGLAR 100 UNIT/ML KWIKPEN]  1     Sig: INJECT 25 UNITS SUBCUTANEOUS DAILY (TO REPLACE LANTUS)       Long Acting Insulin Protocol Passed - 9/30/2019  2:24 AM        Passed - Blood pressure less than 140/90 in past 6 months     BP Readings from Last 3 Encounters:   09/03/19 126/65   08/16/19 104/69   08/08/19 108/58                 Passed - LDL on file in past 12 months     Recent Labs   Lab Test 04/09/19  1017   LDL 29             Passed - Microalbumin on file in past 12 months     Recent Labs   Lab Test 04/09/19  1017   MICROL 28   UMALCR 50.98*             Passed - Serum creatinine on file in past 12 months     Recent Labs   Lab Test 09/16/19  1416  04/02/17  2213   CR 0.99   < >  --    CREAT  --   --  0.6    < > = values in this interval not displayed.             Passed - HgbA1C in past 3 or 6 months     If HgbA1C is 8 or greater, it needs to be on file within the past 3 months.  If less than 8, must be on file within the past 6 months.     Recent Labs   Lab Test 04/09/19  1017   A1C 7.2*             Passed - Medication is active on med list        Passed - Patient is age 18 or older        Passed - Recent (6 mo) or future  "(30 days) visit within the authorizing provider's specialty     Patient had office visit in the last 6 months or has a visit in the next 30 days with authorizing provider or within the authorizing provider's specialty.  See \"Patient Info\" tab in inbasket, or \"Choose Columns\" in Meds & Orders section of the refill encounter.            "

## 2019-10-02 ENCOUNTER — OFFICE VISIT (OUTPATIENT)
Dept: UROLOGY | Facility: CLINIC | Age: 79
End: 2019-10-02
Attending: INTERNAL MEDICINE
Payer: MEDICARE

## 2019-10-02 ENCOUNTER — PRE VISIT (OUTPATIENT)
Dept: UROLOGY | Facility: CLINIC | Age: 79
End: 2019-10-02

## 2019-10-02 VITALS
HEIGHT: 66 IN | WEIGHT: 201 LBS | DIASTOLIC BLOOD PRESSURE: 70 MMHG | BODY MASS INDEX: 32.3 KG/M2 | SYSTOLIC BLOOD PRESSURE: 139 MMHG | HEART RATE: 60 BPM

## 2019-10-02 DIAGNOSIS — N39.41 URGE INCONTINENCE OF URINE: Primary | ICD-10-CM

## 2019-10-02 DIAGNOSIS — N30.00 ACUTE CYSTITIS WITHOUT HEMATURIA: ICD-10-CM

## 2019-10-02 DIAGNOSIS — R39.15 URINARY URGENCY: ICD-10-CM

## 2019-10-02 DIAGNOSIS — R35.0 FREQUENCY OF URINATION: ICD-10-CM

## 2019-10-02 ASSESSMENT — PAIN SCALES - GENERAL: PAINLEVEL: NO PAIN (0)

## 2019-10-02 ASSESSMENT — MIFFLIN-ST. JEOR: SCORE: 1403.48

## 2019-10-02 NOTE — PROGRESS NOTES
"CC: increased urinary urgency at night time    HPI:  Betty Tee is a 79 year old female with extensive PMH (listed below) asked to be seen in consultation by Dr. Bran for the above.  The patient's main concern is increased urgency at night time. This problem has been going on for 2 years and has been getting worse.  It is associated with  incontinence.  She has 0 episodes of incontinence per day but has 1-2 episodes of incontinence per night per week where she \"isn't able to make it to the bathroom\". Has been using 1 pad at night and no pads during the day.  She has had no previous treatment for her condition.  The patient voids q1 hours, nocturia X 1.  She drinks ~32 ounces per day and takes lasix.  She denies any dysuria,  hematuria, hesitancy, intermittency, feeling of incomplete emptying, or any recent hx of UTI's or stones.    The patient has constipation, for which she is taking metamucil and miralax.  She is not sexually active. She denies any vaginal bulge.      Obstetric Hx:  She has never been pregnant.     Past Medical History:   Diagnosis Date     Alcohol abuse, in remission      Allergic rhinitis, cause unspecified     allegra helps when she takes it     Antiplatelet or antithrombotic long-term use      Atrial fibrillation (H)     in hosp in 11/11 after surgery w/ fluid overload     Cardiomegaly     LVH on stress echo- cardiac w/u at [facility] ER- neg CT scan for PE, neg stress echo in 8/06     Chest pain, unspecified      Disorder of bone and cartilage, unspecified     osteopenia (had been on prempro), improved on 6/06 dexa, stable dexa 11/10     Diverticulosis of colon (without mention of hemorrhage)     last episode yrs ago     Essential hypertension, benign      Follicular bronchiolitis (H)     associated with Sjogrens, dx by chest CT showing mosaic attenuation and air trapping     Gastro-oesophageal reflux disease      ILD (interstitial lung disease) (H)     associated with Sjogrens, also has " mildly elevated IgG4, first noted on chest CT 2015 (mild changes) and also has small airways disease; ILD improved on follow up chest CT 2018.     Insomnia, unspecified     weaned off clonazepam     Irregular heart beat      Lumbago 7/09    MRI with DJD, now seeing Dr. Cain for sciatic sx's     Major depressive disorder, recurrent episode, moderate (H)      Obstructive sleep apnea      Osteoarthrosis, unspecified whether generalized or localized, unspecified site      Sjogren's syndrome (H)     + RG and SSA and lip bx     Sleep apnea      Tobacco use disorder     chantix in 9/07, started again in 6/08, working       Past Surgical History:   Procedure Laterality Date     BACK SURGERY  1962     BIOPSY BREAST  9/27/02    Biopsy Left Breast     C APPENDECTOMY  1970's?     C NONSPECIFIC PROCEDURE  11/05    exploratory abd lap, adhesions, resolved RLQ pain, diverticulitis episodes     CARDIAC SURGERY       CHOLECYSTECTOMY  1990's?     COLECTOMY LEFT  11/7/2011    Procedure:COLECTOMY LEFT; Laparoscopic mobilization of splenic flexture, sigmoid colectomy, coloprotoscopy, loop illeostomy; Surgeon:CK CASTANEDA; Location:[facility] OR     HYSTERECTOMY TOTAL ABDOMINAL, BILATERAL SALPINGO-OOPHORECTOMY, COMBINED  11/7/2011    Procedure:COMBINED HYSTERECTOMY TOTAL ABDOMINAL, BILATERAL SALPINGO-OOPHORECTOMY; total abdominal hysterectomy, bilateral salpingo-oophorectomy; Surgeon:ALETA MANUEL; Location:[facility] OR     INSERT STENT URETER  11/7/2011    Procedure:INSERT STENT URETER; Placement of Bilateral Ureteral Stents ; Surgeon:PRANEETH BRYANT; Location:[facility] OR     SIGMOIDOSCOPY FLEXIBLE  11/3/2011    Procedure:SIGMOIDOSCOPY FLEXIBLE; Flexible Sigmoidoscopy; Surgeon:CK CASTANEDA; Location:[facility] OR     TAKEDOWN ILEOSTOMY  2/1/2012    Procedure:TAKEDOWN ILEOSTOMY; Takedown Loop Ileostomy ; Surgeon:CK CASTANEDA; Location:[facility] OR       Meds, Allergies, FHx and SHx reviewed per nurse's intake note.    ROS is negative on a 14 point scale  except for.  All other positive and pertinent information is mentioned in the HPI.    PEx:   not currently breastfeeding.  Data Unavailable, There is no height or weight on file to calculate BMI., 0 lbs 0 oz  Gen appearance:  Well groomed  HEENT:  EOMI, AT NC  Psych:  Normal Affect  Neuro:  A/O X 3  Skin:  Warm to touch  Resp:  No increased respiratory effort  Vasc:  Non-cyanotic  lymph:  LE edema present  Abd:  Soft/NT, ND, no palpable masses  Musk:  Full ROM in extremities  :  Defer exam.  Perineum WNL.    PVR: 70 mL on bladder scan by nursing.    UA: UA RESULTS:  Recent Labs   Lab Test 08/16/19  1400  08/01/17  1107   COLOR Yellow   < > Yellow   APPEARANCE Slightly Cloudy   < > Clear   URINEGLC Negative   < > Negative   URINEBILI Negative   < > Negative   URINEKETONE Negative   < > Negative   SG 1.011   < > 1.010   UBLD Negative   < > Trace*   URINEPH 5.0   < > 6.0   PROTEIN Negative   < > Negative   UROBILINOGEN  --   --  0.2   NITRITE Negative   < > Negative   LEUKEST Trace*   < > Small*   RBCU 1   < > 2-5*   WBCU 5   < > 5-10*    < > = values in this interval not displayed.       Orders Only on 09/16/2019   Component Date Value Ref Range Status     Sodium 09/16/2019 137  133 - 144 mmol/L Final     Potassium 09/16/2019 4.3  3.4 - 5.3 mmol/L Final     Chloride 09/16/2019 105  94 - 109 mmol/L Final     Carbon Dioxide 09/16/2019 24  20 - 32 mmol/L Final     Anion Gap 09/16/2019 8  3 - 14 mmol/L Final     Glucose 09/16/2019 139* 70 - 99 mg/dL Final     Urea Nitrogen 09/16/2019 21  7 - 30 mg/dL Final     Creatinine 09/16/2019 0.99  0.52 - 1.04 mg/dL Final     GFR Estimate 09/16/2019 54* >60 mL/min/[1.73_m2] Final    Comment: Non  GFR Calc  Starting 12/18/2018, serum creatinine based estimated GFR (eGFR) will be   calculated using the Chronic Kidney Disease Epidemiology Collaboration   (CKD-EPI) equation.       GFR Estimate If Black 09/16/2019 62  >60 mL/min/[1.73_m2] Final    Comment:   American GFR Calc  Starting 12/18/2018, serum creatinine based estimated GFR (eGFR) will be   calculated using the Chronic Kidney Disease Epidemiology Collaboration   (CKD-EPI) equation.       Calcium 09/16/2019 9.1  8.5 - 10.1 mg/dL Final     ASSESSMENT and PLAN:  This is a 79 year old female presenting with new increased urinary urgency at night time. Different management options were discussed with the patient including observation, medication, and conservative measures. After discussion with the patient, opted for conservative management initially with voiding diary to gather more information. Increased urgency at night time possibly due to combination of fluid retention and timing of diuretic medication use. The patient understood and agreed with the plan.    Patient elected trial of conservative measures:  -Voiding diary.  -Elevate legs in the afternoon.  -Limit salt intake.  -Spoke with patient about timing of diuretic medication in afternoon as opposed to night time.   -Could consider adding vaginal estrogen cream in the future.  -Follow up in clinic in 1 month.    Janeen Hanson MD  PGY-1 Urology    Patient was seen, evaluated and plan was formulated in conjunction with me and I agree with the above.  Vale Nassar MD

## 2019-10-02 NOTE — LETTER
"10/2/2019       RE: Betty Tee  [address]  [facility] [address]     Dear Colleague,    Thank you for referring your patient, Betty Tee, to the [facility] UROLOGY AND INST FOR PROSTATE AND UROLOGIC CANCERS at [facility]. Please see a copy of my visit note below.    CC: increased urinary urgency at night time    HPI:  Betty Tee is a 79 year old female with extensive PMH (listed below) asked to be seen in consultation by Dr. Bran for the above.  The patient's main concern is increased urgency at night time. This problem has been going on for 2 years and has been getting worse.  It is associated with  incontinence.  She has 0 episodes of incontinence per day but has 1-2 episodes of incontinence per night per week where she \"isn't able to make it to the bathroom\". Has been using 1 pad at night and no pads during the day.  She has had no previous treatment for her condition.  The patient voids q1 hours, nocturia X 1.  She drinks ~32 ounces per day and takes lasix.  She denies any dysuria,  hematuria, hesitancy, intermittency, feeling of incomplete emptying, or any recent hx of UTI's or stones.    The patient has constipation, for which she is taking metamucil and miralax.  She is not sexually active. She denies any vaginal bulge.      Obstetric Hx:  She has never been pregnant.     Past Medical History:   Diagnosis Date     Alcohol abuse, in remission      Allergic rhinitis, cause unspecified     allegra helps when she takes it     Antiplatelet or antithrombotic long-term use      Atrial fibrillation (H)     in hosp in 11/11 after surgery w/ fluid overload     Cardiomegaly     LVH on stress echo- cardiac w/u at [facility] ER- neg CT scan for PE, neg stress echo in 8/06     Chest pain, unspecified      Disorder of bone and cartilage, unspecified     osteopenia (had been on prempro), improved on 6/06 dexa, stable dexa 11/10     Diverticulosis of colon " (without mention of hemorrhage)     last episode yrs ago     Essential hypertension, benign      Follicular bronchiolitis (H)     associated with Sjogrens, dx by chest CT showing mosaic attenuation and air trapping     Gastro-oesophageal reflux disease      ILD (interstitial lung disease) (H)     associated with Sjogrens, also has mildly elevated IgG4, first noted on chest CT 2015 (mild changes) and also has small airways disease; ILD improved on follow up chest CT 2018.     Insomnia, unspecified     weaned off clonazepam     Irregular heart beat      Lumbago 7/09    MRI with DJD, now seeing Dr. Cain for sciatic sx's     Major depressive disorder, recurrent episode, moderate (H)      Obstructive sleep apnea      Osteoarthrosis, unspecified whether generalized or localized, unspecified site      Sjogren's syndrome (H)     + RG and SSA and lip bx     Sleep apnea      Tobacco use disorder     chantix in 9/07, started again in 6/08, working       Past Surgical History:   Procedure Laterality Date     BACK SURGERY  1962     BIOPSY BREAST  9/27/02    Biopsy Left Breast     C APPENDECTOMY  1970's?     C NONSPECIFIC PROCEDURE  11/05    exploratory abd lap, adhesions, resolved RLQ pain, diverticulitis episodes     CARDIAC SURGERY       CHOLECYSTECTOMY  1990's?     COLECTOMY LEFT  11/7/2011    Procedure:COLECTOMY LEFT; Laparoscopic mobilization of splenic flexture, sigmoid colectomy, coloprotoscopy, loop illeostomy; Surgeon:CK CASTANEDA; Location:[facility] OR     HYSTERECTOMY TOTAL ABDOMINAL, BILATERAL SALPINGO-OOPHORECTOMY, COMBINED  11/7/2011    Procedure:COMBINED HYSTERECTOMY TOTAL ABDOMINAL, BILATERAL SALPINGO-OOPHORECTOMY; total abdominal hysterectomy, bilateral salpingo-oophorectomy; Surgeon:ALETA MANUEL; Location:[facility] OR     INSERT STENT URETER  11/7/2011    Procedure:INSERT STENT URETER; Placement of Bilateral Ureteral Stents ; Surgeon:PRANEETH BRYANT; Location:[facility] OR     SIGMOIDOSCOPY FLEXIBLE  11/3/2011     Procedure:SIGMOIDOSCOPY FLEXIBLE; Flexible Sigmoidoscopy; Surgeon:CK CASTANEDA; Location:[facility] OR     TAKEDOWN ILEOSTOMY  2/1/2012    Procedure:TAKEDOWN ILEOSTOMY; Takedown Loop Ileostomy ; Surgeon:CK CASTANEDA; Location:[facility] OR       Meds, Allergies, FHx and SHx reviewed per nurse's intake note.    ROS is negative on a 14 point scale except for.  All other positive and pertinent information is mentioned in the HPI.    PEx:   not currently breastfeeding.  Data Unavailable, There is no height or weight on file to calculate BMI., 0 lbs 0 oz  Gen appearance:  Well groomed  HEENT:  EOMI, AT NC  Psych:  Normal Affect  Neuro:  A/O X 3  Skin:  Warm to touch  Resp:  No increased respiratory effort  Vasc:  Non-cyanotic  lymph:  LE edema present  Abd:  Soft/NT, ND, no palpable masses  Musk:  Full ROM in extremities  :  Defer exam.  Perineum WNL.    PVR: 70 mL on bladder scan by nursing.    UA: UA RESULTS:  Recent Labs   Lab Test 08/16/19  1400  08/01/17  1107   COLOR Yellow   < > Yellow   APPEARANCE Slightly Cloudy   < > Clear   URINEGLC Negative   < > Negative   URINEBILI Negative   < > Negative   URINEKETONE Negative   < > Negative   SG 1.011   < > 1.010   UBLD Negative   < > Trace*   URINEPH 5.0   < > 6.0   PROTEIN Negative   < > Negative   UROBILINOGEN  --   --  0.2   NITRITE Negative   < > Negative   LEUKEST Trace*   < > Small*   RBCU 1   < > 2-5*   WBCU 5   < > 5-10*    < > = values in this interval not displayed.       Orders Only on 09/16/2019   Component Date Value Ref Range Status     Sodium 09/16/2019 137  133 - 144 mmol/L Final     Potassium 09/16/2019 4.3  3.4 - 5.3 mmol/L Final     Chloride 09/16/2019 105  94 - 109 mmol/L Final     Carbon Dioxide 09/16/2019 24  20 - 32 mmol/L Final     Anion Gap 09/16/2019 8  3 - 14 mmol/L Final     Glucose 09/16/2019 139* 70 - 99 mg/dL Final     Urea Nitrogen 09/16/2019 21  7 - 30 mg/dL Final     Creatinine 09/16/2019 0.99  0.52 - 1.04 mg/dL Final     GFR Estimate  09/16/2019 54* >60 mL/min/[1.73_m2] Final    Comment: Non  GFR Calc  Starting 12/18/2018, serum creatinine based estimated GFR (eGFR) will be   calculated using the Chronic Kidney Disease Epidemiology Collaboration   (CKD-EPI) equation.       GFR Estimate If Black 09/16/2019 62  >60 mL/min/[1.73_m2] Final    Comment:  GFR Calc  Starting 12/18/2018, serum creatinine based estimated GFR (eGFR) will be   calculated using the Chronic Kidney Disease Epidemiology Collaboration   (CKD-EPI) equation.       Calcium 09/16/2019 9.1  8.5 - 10.1 mg/dL Final     ASSESSMENT and PLAN:  This is a 79 year old female presenting with new increased urinary urgency at night time. Different management options were discussed with the patient including observation, medication, and conservative measures. After discussion with the patient, opted for conservative management initially with voiding diary to gather more information. Increased urgency at night time possibly due to combination of fluid retention and timing of diuretic medication use. The patient understood and agreed with the plan.    Patient elected trial of conservative measures:  -Voiding diary.  -Elevate legs in the afternoon.  -Limit salt intake.  -Spoke with patient about timing of diuretic medication in afternoon as opposed to night time.   -Could consider adding vaginal estrogen cream in the future.  -Follow up in clinic in 1 month.    Janeen Hanson MD  PGY-1 Urology    Patient was seen, evaluated and plan was formulated in conjunction with me and I agree with the above.  Vale Nassar MD

## 2019-10-02 NOTE — PATIENT INSTRUCTIONS
Complete two bladder diaries.  Follow up with Dr. Nassar in approximately a month.          It was a pleasure meeting with you today.  Thank you for allowing me and my team the privilege of caring for you today.  YOU are the reason we are here, and I truly hope we provided you with the excellent service you deserve.  Please let us know if there is anything else we can do for you so that we can be sure you are leaving completely satisfied with your care experience.

## 2019-10-02 NOTE — NURSING NOTE
Chief Complaint   Patient presents with     Consult     unspecified urinary incontinence       Mere Connolly MA

## 2019-10-07 ENCOUNTER — OFFICE VISIT (OUTPATIENT)
Dept: OPHTHALMOLOGY | Facility: CLINIC | Age: 79
End: 2019-10-07
Attending: OPHTHALMOLOGY
Payer: MEDICARE

## 2019-10-07 DIAGNOSIS — M35.01 KERATOCONJUNCTIVITIS SICCA, IN SJOGREN'S SYNDROME (H): ICD-10-CM

## 2019-10-07 DIAGNOSIS — H04.123 DRY EYES, BILATERAL: ICD-10-CM

## 2019-10-07 DIAGNOSIS — Z96.1 PSEUDOPHAKIA OF BOTH EYES: ICD-10-CM

## 2019-10-07 DIAGNOSIS — H52.4 PRESBYOPIA: ICD-10-CM

## 2019-10-07 DIAGNOSIS — E11.9 DIABETES MELLITUS TYPE 2 WITHOUT RETINOPATHY (H): Primary | ICD-10-CM

## 2019-10-07 PROCEDURE — G0463 HOSPITAL OUTPT CLINIC VISIT: HCPCS | Mod: ZF

## 2019-10-07 ASSESSMENT — REFRACTION_WEARINGRX
OD_SPHERE: PLANO
OD_AXIS: 175
OS_AXIS: 135
OS_ADD: +2.75
OD_ADD: +2.50
SPECS_TYPE: LINED BIF
OD_CYLINDER: +0.50
OS_SPHERE: -0.25
OS_CYLINDER: +0.75

## 2019-10-07 ASSESSMENT — VISUAL ACUITY
CORRECTION_TYPE: GLASSES
OS_CC+: -1
METHOD: SNELLEN - LINEAR
OS_CC: 20/20
OD_CC: 20/20

## 2019-10-07 ASSESSMENT — CONF VISUAL FIELD
OD_NORMAL: 1
OS_NORMAL: 1
METHOD: COUNTING FINGERS

## 2019-10-07 ASSESSMENT — TONOMETRY
IOP_METHOD: TONOPEN
OD_IOP_MMHG: 19
OS_IOP_MMHG: 19

## 2019-10-07 ASSESSMENT — SLIT LAMP EXAM - LIDS
COMMENTS: NORMAL, DECREASED TEAR LAKE
COMMENTS: NORMAL, DECREASED TEAR LAKE

## 2019-10-07 ASSESSMENT — CUP TO DISC RATIO
OS_RATIO: 0.3
OD_RATIO: 0.3

## 2019-10-07 ASSESSMENT — EXTERNAL EXAM - LEFT EYE: OS_EXAM: NORMAL

## 2019-10-07 NOTE — PROGRESS NOTES
HPI  Betty Tee is a 79 year old female here for diabetic eye exam. She feels her vision is overall good and stable in both eyes with current glasses. She was seen by Dr. Nash at last visit for subconjunctival hemorrhage. This has now resolved without issues. No pain, redness, discharge. No flashes/floaters.     POH: CE/IOL BE [facility] >5 years ago, Sjogren syndrome with dry eye  PMH: Type 2 DM, A fib on coumadin, ANGELICA on CPAP  FHx: Glaucoma in sister and uncle    Assessment & Plan      (E11.9) Diabetes mellitus type 2 without retinopathy (H)  (primary encounter diagnosis)  Comment: On insulin and metformin. Last A1c 7.2 4/2019. No diabetic retinopathy.  Plan: Discussed the importance of tight blood glucose control in the prevention of diabetic retinopathy. Recommend yearly dilated eye exam.    (Z96.1) Pseudophakia of both eyes  Comment: Clear visual axis with open posterior capsule both eyes.   Plan: Observe    (H04.123) Dry eyes, bilateral  (M35.01) Keratoconjunctivitis sicca, in Sjogren's syndrome (H)  Comment: Exacerbated by CPAP use.  Plan: Continue ATs and lubricating ointment at bedtime    (H52.4) Presbyopia  Comment: Good vision with current glasses  Plan: Ok to continue with current glasses    -----------------------------------------------------------------------------------    Patient disposition:   Return in about 1 year (around 10/7/2020). or sooner as needed.    Teaching statement:  Complete documentation of historical and exam elements from today's encounter can be found in the full encounter summary report (not reduplicated in this progress note). I personally obtained the chief complaint(s) and history of present illness.  I confirmed and edited as necessary the review of systems, past medical/surgical history, family history, social history, and examination findings as documented by others; and I examined the patient myself. I personally reviewed the relevant tests,  images, and reports as documented above.     I formulated and edited as necessary the assessment and plan and discussed the findings and management plan with the patient and family.    Kami Lang MD  Comprehensive Ophthalmology & Ocular Pathology  Department of Ophthalmology and Visual Neurosciences  [email or web address]  Pager [phone]

## 2019-10-07 NOTE — NURSING NOTE
Chief Complaints and History of Present Illnesses   Patient presents with     Follow Up     Type 2 diabetes mellitus with hyperglycemia     Chief Complaint(s) and History of Present Illness(es)     Follow Up     Laterality: both eyes    Associated symptoms: dryness.  Negative for eye pain, redness and tearing    Pain scale: 0/10    Comments: Type 2 diabetes mellitus with hyperglycemia              Comments     She states that her distance vision has seemed stable in both eyes since her last eye exam.  Her near vision seems a bit worse (the computer screen becomes blurred after she has ronald working for a while).  She wears a C pap at night which seems to make her eyes a bit dry.  Using artficial tears does help the discomfort.     Lab Results   Component Value Date    A1C 7.2 04/09/2019    A1C 6.7 10/02/2018    A1C 6.9 05/23/2018    A1C 7.0 01/31/2018    A1C 7.3 10/06/2017         MARY Avalos 9:28 AM  October 7, 2019

## 2019-10-08 ENCOUNTER — TELEPHONE (OUTPATIENT)
Dept: FAMILY MEDICINE | Facility: CLINIC | Age: 79
End: 2019-10-08

## 2019-10-08 DIAGNOSIS — E11.9 TYPE 2 DIABETES MELLITUS WITHOUT COMPLICATION, WITHOUT LONG-TERM CURRENT USE OF INSULIN (H): ICD-10-CM

## 2019-10-08 NOTE — TELEPHONE ENCOUNTER
Called pt - she had previously had decreased renal function and we were concerned this was due to Ozempic (and prior to that, her SGLT2i). Pt reports that she was taking ibuprofen during the time her labs were drawn - which is likely what caused the decrease in her eGFR and increase in SCr. Her last BMP was improved and she was not taking IBU at that time. Will continue Ozempic as she is still doing well and likes the med. Last A1c was in April, Ozempic was started in July. Will have her do A1c in early November.     Sabrina Meek, MarcosD, JAMES, BCACP  MTM Pharmacist, [facility]

## 2019-10-08 NOTE — TELEPHONE ENCOUNTER
Reason for Call:  Other     Detailed comments: Pt would like to know if she should renew her rx ozmepic    Phone Number Patient can be reached at: Home number on file [phone] (home)    Best Time: any    Can we leave a detailed message on this number? YES    Call taken on 10/8/2019 at 9:18 AM by Paige Martinez

## 2019-10-12 DIAGNOSIS — M35.02 SJOGREN'S SYNDROME WITH LUNG INVOLVEMENT (H): ICD-10-CM

## 2019-10-14 ENCOUNTER — TELEPHONE (OUTPATIENT)
Dept: FAMILY MEDICINE | Facility: CLINIC | Age: 79
End: 2019-10-14

## 2019-10-14 RX ORDER — CEVIMELINE HYDROCHLORIDE 30 MG/1
30 CAPSULE ORAL 3 TIMES DAILY
Qty: 270 CAPSULE | Refills: 0 | Status: SHIPPED | OUTPATIENT
Start: 2019-10-14 | End: 2020-02-18

## 2019-10-14 NOTE — TELEPHONE ENCOUNTER
Patient was calling about Ozempic instructions  When called her back she stated she answered her own question  Nothing further needed from us  Lydia HALEY RN

## 2019-10-14 NOTE — TELEPHONE ENCOUNTER
Reason for Call:  Other prescription    Detailed comments: patient has questions and would like a call back regarding this RX please BD JANE U/F 32G X 4 MM insulin pen needle did not want to get into details with me. Thanks     Phone Number Patient can be reached at: Cell number on file:    Telephone Information:   Mobile [phone]       Best Time: any     Can we leave a detailed message on this number? YES    Call taken on 10/14/2019 at 10:49 AM by Mabel Cabrera

## 2019-10-14 NOTE — TELEPHONE ENCOUNTER
"cevimeline (EVOXAC) 30 MG capsule      Last Written Prescription Date:  8/16/19  Last Fill Quantity: 90,   # refills: 5  Last Office Visit : 8/16/19  Future Office visit:  1/15/20    Routing refill request to provider for review/approval because:  Per pharmacist, patient requests 90 day. \" only 2 refills remaining. Current  RX converted to 90 day/ per pharmacy/ pt requests. Per WO Dr. Lopez/Yobani RN            "

## 2019-10-21 DIAGNOSIS — M35.02 SJOGREN'S SYNDROME WITH LUNG INVOLVEMENT (H): ICD-10-CM

## 2019-10-21 DIAGNOSIS — J84.9 ILD (INTERSTITIAL LUNG DISEASE) (H): ICD-10-CM

## 2019-10-21 NOTE — TELEPHONE ENCOUNTER
"MONTELUKAST  Last Written Prescription Date:  07/23/19  Last Fill Quantity: 90,  # refills: 0   Last office visit: 9/3/2019 with prescribing provider:  YES   Future Office Visit:   Next 5 appointments (look out 90 days)    Oct 22, 2019 12:45 PM CDT  Office Visit with Ilene Tristan MD  [facility] ([facility]) [address]  [facility] [address]  [phone]   Oct 22, 2019  1:30 PM CDT  SHORT with Sabrina Meek [facility] ([facility]) [address]  [address]  [facility] [address] [phone]         Requested Prescriptions   Pending Prescriptions Disp Refills     montelukast (SINGULAIR) 10 MG tablet [Pharmacy Med Name: MONTELUKAST SOD 10 MG TABLET] 90 tablet 0     Sig: TAKE 1 TABLET BY MOUTH EVERYDAY AT BEDTIME       Leukotriene Inhibitors Protocol Passed - 10/21/2019 12:16 PM        Passed - Patient is age 12 or older     If patient is under 16, ok to refill using age based dosing.           Passed - Recent (12 mo) or future (30 days) visit within the authorizing provider's specialty     Patient has had an office visit with the authorizing provider or a provider within the authorizing providers department within the previous 12 mos or has a future within next 30 days. See \"Patient Info\" tab in inbasket, or \"Choose Columns\" in Meds & Orders section of the refill encounter.              Passed - Medication is active on med list        "

## 2019-10-22 ENCOUNTER — OFFICE VISIT (OUTPATIENT)
Dept: PHARMACY | Facility: CLINIC | Age: 79
End: 2019-10-22
Payer: COMMERCIAL

## 2019-10-22 ENCOUNTER — OFFICE VISIT (OUTPATIENT)
Dept: FAMILY MEDICINE | Facility: CLINIC | Age: 79
End: 2019-10-22
Payer: MEDICARE

## 2019-10-22 VITALS
HEART RATE: 61 BPM | RESPIRATION RATE: 20 BRPM | SYSTOLIC BLOOD PRESSURE: 92 MMHG | TEMPERATURE: 98.2 F | DIASTOLIC BLOOD PRESSURE: 53 MMHG | HEIGHT: 66 IN | OXYGEN SATURATION: 95 % | WEIGHT: 203 LBS | BODY MASS INDEX: 32.62 KG/M2

## 2019-10-22 DIAGNOSIS — R39.15 URINARY URGENCY: ICD-10-CM

## 2019-10-22 DIAGNOSIS — E11.65 TYPE 2 DIABETES MELLITUS WITH HYPERGLYCEMIA, WITH LONG-TERM CURRENT USE OF INSULIN (H): Primary | ICD-10-CM

## 2019-10-22 DIAGNOSIS — N18.30 CKD (CHRONIC KIDNEY DISEASE) STAGE 3, GFR 30-59 ML/MIN (H): ICD-10-CM

## 2019-10-22 DIAGNOSIS — K21.9 GASTROESOPHAGEAL REFLUX DISEASE WITHOUT ESOPHAGITIS: ICD-10-CM

## 2019-10-22 DIAGNOSIS — I10 ESSENTIAL HYPERTENSION WITH GOAL BLOOD PRESSURE LESS THAN 140/90: ICD-10-CM

## 2019-10-22 DIAGNOSIS — Z23 FLU VACCINE NEED: ICD-10-CM

## 2019-10-22 DIAGNOSIS — M25.50 ARTHRALGIA, UNSPECIFIED JOINT: ICD-10-CM

## 2019-10-22 DIAGNOSIS — Z79.4 TYPE 2 DIABETES MELLITUS WITH HYPERGLYCEMIA, WITH LONG-TERM CURRENT USE OF INSULIN (H): Primary | ICD-10-CM

## 2019-10-22 LAB — HBA1C MFR BLD: 6.3 % (ref 0–5.6)

## 2019-10-22 PROCEDURE — 99207 C FOOT EXAM  NO CHARGE: CPT | Performed by: FAMILY MEDICINE

## 2019-10-22 PROCEDURE — 36415 COLL VENOUS BLD VENIPUNCTURE: CPT | Performed by: FAMILY MEDICINE

## 2019-10-22 PROCEDURE — 99607 MTMS BY PHARM ADDL 15 MIN: CPT | Performed by: PHARMACIST

## 2019-10-22 PROCEDURE — 99606 MTMS BY PHARM EST 15 MIN: CPT | Performed by: PHARMACIST

## 2019-10-22 PROCEDURE — 83036 HEMOGLOBIN GLYCOSYLATED A1C: CPT | Performed by: FAMILY MEDICINE

## 2019-10-22 PROCEDURE — 99214 OFFICE O/P EST MOD 30 MIN: CPT | Mod: 25 | Performed by: FAMILY MEDICINE

## 2019-10-22 PROCEDURE — 90662 IIV NO PRSV INCREASED AG IM: CPT | Performed by: FAMILY MEDICINE

## 2019-10-22 PROCEDURE — G0008 ADMIN INFLUENZA VIRUS VAC: HCPCS | Performed by: FAMILY MEDICINE

## 2019-10-22 PROCEDURE — 82043 UR ALBUMIN QUANTITATIVE: CPT | Performed by: FAMILY MEDICINE

## 2019-10-22 PROCEDURE — 80048 BASIC METABOLIC PNL TOTAL CA: CPT | Performed by: FAMILY MEDICINE

## 2019-10-22 RX ORDER — MONTELUKAST SODIUM 10 MG/1
TABLET ORAL
Qty: 90 TABLET | Refills: 0 | Status: SHIPPED | OUTPATIENT
Start: 2019-10-22 | End: 2020-02-03

## 2019-10-22 ASSESSMENT — PATIENT HEALTH QUESTIONNAIRE - PHQ9: SUM OF ALL RESPONSES TO PHQ QUESTIONS 1-9: 4

## 2019-10-22 ASSESSMENT — MIFFLIN-ST. JEOR: SCORE: 1412.55

## 2019-10-22 NOTE — PROGRESS NOTES
Subjective   Betty Tee is a 79 year old female who presents to clinic today for the following health issues:    HPI   Diabetes Follow-up - started on ozempic a couple months ago, overall doing well on it she thinks.    How often are you checking your blood sugar? Three times daily    What time of day are you checking your blood sugars (select all that apply)?  Before and after meals    Have you had any blood sugars above 200?  No    Have you had any blood sugars below 70?  No    What symptoms do you notice when your blood sugar is low?  None    What concerns do you have today about your diabetes? None     Do you have any of these symptoms? (Select all that apply)  Burning in feet     Have you had a diabetic eye exam in the last 12 months? Yes-     Urology- saw Dr. Nassar, going back in a month after doing urination diaries.    Rheum-   Jts hurt, using lots of lidocaine patches, mostly in hip area.  Hard to find a comfortable position.  Had recent dexa, through rheumatology, found worsening bone density.  Pt did have fosamax rx'd by rheum (Dr. Stringer) in 10/17, but pt doesn't think she every took it.  Reviewed 10/17 study and our discussion in 11/17- and she was wary of taking it at the time.  She's feeling better about taking it now, but now we have concerns about her kidney function (if continues, prolia probably the best option).      GERD- has transitioned from PPI to zantac, with possibly mild return of GERD sx's.  Low grade stomach discomfort in upper abdomen.  States they are very mild now, though, and isn't sure if it's related to lower appetite, and if that could be from the new DM medication.      MTM ppi vs zantac/pepcid for upper stomach ache.  Could use PPI x 2 wks, and then go to pepcid.      Rib pain f/u- no issues now after fall in 8/19.        BP Readings from Last 2 Encounters:   10/22/19 92/53   10/02/19 139/70     Hemoglobin A1C (%)   Date Value   10/22/2019 6.3 (H)   04/09/2019 7.2 (H)      LDL Cholesterol Calculated (mg/dL)   Date Value   04/09/2019 29   05/23/2018 21         Patient Active Problem List   Diagnosis     Temporomandibular joint disorder     Diverticulitis of colon     Disorder of bone and cartilage     Osteoarthritis     Alcohol abuse, in remission     Restless legs syndrome (RLS)     Major depressive disorder, recurrent episode, moderate (H)     Essential hypertension with goal blood pressure less than 140/90     Advanced directives, counseling/discussion     Colouterine fistula     Permanent atrial fibrillation     Left ventricular hypertrophy     Health Care Home     Insomnia     Joint pains     Allergic reaction caused by a drug - likely plaquenil     Breast fibroadenoma     DVT (deep venous thrombosis) (H)     Fatty liver disease, nonalcoholic     Rib pain     Neck mass     Gastroesophageal reflux disease without esophagitis     Enlarged lymph node     Sjogren's syndrome with lung involvement (H)     ANGELICA (obstructive sleep apnea)     ILD (interstitial lung disease) (H)     Lower GI bleed     Acute and chronic respiratory failure with hypoxia (H)     (HFpEF) heart failure with preserved ejection fraction (H)     Type 2 diabetes mellitus with hyperglycemia, with long-term current use of insulin (H)     Heart failure, chronic, with acute decompensation (H)     Hyperlipidemia LDL goal <100     Long term current use of anticoagulant therapy     Inflammatory arthritis     Follicular bronchiolitis (H)     CKD (chronic kidney disease) stage 3, GFR 30-59 ml/min (H)     Closed head injury     Warfarin-induced coagulopathy (H)     Urge incontinence of urine     Urinary urgency     Past Surgical History:   Procedure Laterality Date     BACK SURGERY  1962     BIOPSY BREAST  9/27/02    Biopsy Left Breast     C APPENDECTOMY  1970's?     C NONSPECIFIC PROCEDURE  11/05    exploratory abd lap, adhesions, resolved RLQ pain, diverticulitis episodes     CARDIAC SURGERY       CHOLECYSTECTOMY   ?     COLECTOMY LEFT  2011    Procedure:COLECTOMY LEFT; Laparoscopic mobilization of splenic flexture, sigmoid colectomy, coloprotoscopy, loop illeostomy; Surgeon:CK CASTANEDA; Location:[facility]     HYSTERECTOMY TOTAL ABDOMINAL, BILATERAL SALPINGO-OOPHORECTOMY, COMBINED  2011    Procedure:COMBINED HYSTERECTOMY TOTAL ABDOMINAL, BILATERAL SALPINGO-OOPHORECTOMY; total abdominal hysterectomy, bilateral salpingo-oophorectomy; Surgeon:ALETA MANUEL; Location:[facility]     INSERT STENT URETER  2011    Procedure:INSERT STENT URETER; Placement of Bilateral Ureteral Stents ; Surgeon:PRANEETH BRYANT; Location:[facility]     SIGMOIDOSCOPY FLEXIBLE  11/3/2011    Procedure:SIGMOIDOSCOPY FLEXIBLE; Flexible Sigmoidoscopy; Surgeon:CK CASTANEDA; Location:[facility]     TAKEDOWN ILEOSTOMY  2012    Procedure:TAKEDOWN ILEOSTOMY; Takedown Loop Ileostomy ; Surgeon:CK CASTANEDA; Location:[facility]       Social History     Tobacco Use     Smoking status: Former Smoker     Packs/day: 0.50     Years: 10.00     Pack years: 5.00     Types: Cigarettes     Last attempt to quit: 2011     Years since quittin.2     Smokeless tobacco: Never Used     Tobacco comment:  ppd   Substance Use Topics     Alcohol use: No     Alcohol/week: 0.0 standard drinks     Comment: In recovery beginning      Family History   Problem Relation Age of Onset     C.A.D. Mother 63        MI- first at age 63     Heart Disease Mother      Hypertension Mother      Cerebrovascular Disease Mother      Hyperlipidemia Mother      Alcohol/Drug Father      Alzheimer Disease Father      Dementia Father      Hypertension Father      Hyperlipidemia Father      Diabetes Sister      C.A.D. Sister 52        Minor MI- age 50's     Heart Disease Sister      Hypertension Sister      Hypertension Sister      Hypertension Brother      Cancer - colorectal Sister 48        Late 40's early 50's     Prostate Cancer Brother 74        Dx'd age 74      Gastrointestinal Disease Sister         Diverticulitis     Gastrointestinal Disease Brother         Diverticulitis     Lipids Sister      Lipids Sister      Parkinsonism Brother      Diabetes Sister      Heart Disease Sister         CHF     Cancer Sister         lung, smoker     Substance Abuse Sister      Substance Abuse Brother      Asthma Sister      Cancer Sister      Breast Cancer Daughter      Prostate Cancer Brother      Hyperlipidemia Brother      Diabetes Other      Hypertension Other          Current Outpatient Medications   Medication Sig Dispense Refill     acetaminophen (TYLENOL) 500 MG tablet Take 500 mg by mouth nightly as needed        acyclovir (ZOVIRAX) 400 MG tablet Take 1 tablet (400 mg) by mouth 3 times daily For a couple days 15 tablet 2     acyclovir (ZOVIRAX) 5 % external ointment Apply topically 6 times daily As needed for outbreaks 15 g 3     albuterol (PROAIR HFA, PROVENTIL HFA, VENTOLIN HFA) 108 (90 BASE) MCG/ACT inhaler Inhale 2 puffs into the lungs every 6 hours 1 Inhaler 3     atorvastatin (LIPITOR) 10 MG tablet TAKE 1 TABLET BY MOUTH EVERY DAY 90 tablet 0     azithromycin (ZITHROMAX) 250 MG tablet Take 1 tablet (250 mg) by mouth daily 30 tablet 11     BD JANE U/F 32G X 4 MM insulin pen needle USE 4 DAILY AS DIRECTED. 400 each 1     blood glucose (ACCU-CHEK SHANNON PLUS) test strip USE TO TEST BLOOD SUGARS 3 TIMES DAILY 400 strip 3     blood glucose monitoring (ACCU-CHEK FASTCLIX) lancets Use to test blood sugar 4 times daily or as directed.  Ok to substitute alternative if insurance prefers. 1 Box 11     blood glucose monitoring (ACCU-CHEK MULTICLIX) lancets Use to test blood sugar 4 times daily or as directed. 4 Box 3     cevimeline (EVOXAC) 30 MG capsule Take 1 capsule (30 mg) by mouth 3 times daily 270 capsule 0     COMPRESSION STOCKINGS Wear compression stockings in affected leg (right leg) or both legs most of the time during the day and take them off at night. 2 each 2      escitalopram (LEXAPRO) 20 MG tablet TAKE 1 TABLET BY MOUTH EVERY DAY 90 tablet 0     fluticasone (FLONASE) 50 MCG/ACT nasal spray Spray 1-2 sprays into both nostrils daily as needed for allergies 1 Bottle 3     fluticasone (FLOVENT HFA) 220 MCG/ACT inhaler Inhale 2 puffs into the lungs 2 times daily 3 Inhaler 3     Humidifier MISC Continuous humidified air via concentrator.   Diagnosis: ILD  Duration: Lifetime 99. 1 each 1     hydroxychloroquine (PLAQUENIL) 200 MG tablet Take 2 tablets (400 mg) by mouth daily Annual Plaquenil toxicity eye screening required. 180 tablet 1     insulin glargine (BASAGLAR KWIKPEN) 100 UNIT/ML pen INJECT 25 UNITS SUBCUTANEOUS DAILY (TO REPLACE LANTUS) 15 mL 1     ketoconazole (NIZORAL) 2 % external cream Apply topically 2 times daily 60 g 1     lidocaine (LIDODERM) 5 % patch Apply patch to painful area for up to 12 h within a 24 h period.  Remove after 12 hours. 30 patch 5     loratadine (CLARITIN) 10 MG tablet TAKE 1 TABLET BY MOUTH EVERY DAY 90 tablet 1     metFORMIN (GLUCOPHAGE-XR) 500 MG 24 hr tablet TAKE 2 TABLETS BY MOUTH DAILY WITH DINNER 180 tablet 0     metoprolol succinate (TOPROL XL) 25 MG 24 hr tablet Take 0.5 tablets (12.5 mg) by mouth 2 times daily Take 50 mg by mouth in combination with 12.5 for a total of 62.5 twice a day 90 tablet 3     metoprolol succinate (TOPROL-XL) 100 MG 24 hr tablet Take 50 mg by mouth in combination with 12.5 for a total of 62.5 twice a day 90 tablet 3     montelukast (SINGULAIR) 10 MG tablet TAKE 1 TABLET BY MOUTH EVERYDAY AT BEDTIME 90 tablet 0     NOVOLOG FLEXPEN 100 UNIT/ML soln INJECT 12 UNITS SUBCUTANEOUS 3 TIMES DAILY (WITH MEALS) 15 mL 1     order for DME Please provide patient with a POC.  Okay to test patient on POC to maintain oxygen saturations above 90%.   Patient currently uses 2 to 3 LPM oxygen with activity. 1 Device 0     order for DME Equipment being ordered: Oxygen  Pulsed oxygen portable tank  Rate: 4LNC  Diagnosis:  ILD  Duration: Lifetime -99 1 Device 1     order for DME Equipment being ordered: Full face mask for CPAP - size: F10 large 1 each 0     order for DME Oxygen: Patient requires supplemental Oxygen 4 LPM via nasal canula with activity. Please provide patient with a home unit and with portability capability. Oxygen will be for a lifetime. 1 Device 0     potassium chloride ER (KLOR-CON) 20 MEQ CR tablet Take 2 tablets (40 mEq) by mouth every morning AND 1 tablet (20 mEq) every evening. 270 tablet 3     predniSONE (DELTASONE) 1 MG tablet 6/7 mg every other day x one month, 6 mg a day x one month then 5 mg a day and stay on 5 mg a day 90 tablet 1     predniSONE (DELTASONE) 5 MG tablet 6/7 mg every other day x one month, 6 mg a day x one month then 5 mg a day and stay on 5 mg a day 90 tablet 1     rivaroxaban ANTICOAGULANT (XARELTO) 20 MG TABS tablet Take 1 tablet (20 mg) by mouth daily (with dinner) 90 tablet 3     Semaglutide,0.25 or 0.5MG/DOS, (OZEMPIC, 0.25 OR 0.5 MG/DOSE,) 2 MG/1.5ML SOPN Inject 0.5 mg Subcutaneous once a week 4.5 mL 1     spironolactone (ALDACTONE) 25 MG tablet Take 2 tablets (50 mg) by mouth daily 180 tablet 3     tiZANidine (ZANAFLEX) 2 MG tablet Take 1-2 tablets (2-4 mg) by mouth 3 times daily 90 tablet 1     torsemide (DEMADEX) 10 MG tablet Take one tab (10 mg) with one 20 mg tab to = 30 mg daily in afternoon. 90 tablet 3     torsemide (DEMADEX) 20 MG tablet Take 2 tabs (40 mg) in am daily 105 tablet 10     traZODone (DESYREL) 50 MG tablet Take 0.5-1.5 tablets (25-75 mg) by mouth nightly as needed for sleep 90 tablet 1     vitamin D2 (ERGOCALCIFEROL) 58095 units (1250 mcg) capsule Take 1 capsule (50,000 Units) by mouth every 7 days 12 capsule 0     Allergies   Allergen Reactions     Amoxicillin-Pot Clavulanate      Augmentin Nausea and Vomiting     Codeine Nausea and Vomiting     Codeine      PN: LW Reaction: HIVES     Penicillins Nausea and Vomiting     PN: LW Reaction: GI Upset      "Phenobarbital Itching     Phenobarbital      Seasonal Allergies      Recent Labs   Lab Test 10/22/19  1416 09/16/19  1416  08/16/19  1334  04/09/19  1017  10/02/18  1524 05/23/18  1028  02/21/18  1230  10/06/17  1421   A1C 6.3*  --   --   --   --  7.2*  --  6.7* 6.9*  --   --    < > 7.3*   LDL  --   --   --   --   --  29  --   --  21  --  5  --   --    HDL  --   --   --   --   --  59  --   --  53  --  64  --   --    TRIG  --   --   --   --   --  148  --   --  113  --  188*  --   --    ALT  --   --   --  16  --   --   --   --  21  --   --   --  43   CR 1.10* 0.99   < > 1.05*   < > 0.85   < >  --  0.90   < > 0.88   < > 0.94   GFRESTIMATED 48* 54*   < > 50*   < > 65   < >  --  61   < > 62   < > 58*   GFRESTBLACK 55* 62   < > 58*   < > 76   < >  --  74   < > 76   < > 70   POTASSIUM 4.1 4.3   < >  --    < > 3.9   < >  --  3.5   < > 3.9   < > 4.3   TSH  --   --   --   --   --  2.72  --   --  2.42  --   --    < >  --     < > = values in this interval not displayed.      BP Readings from Last 3 Encounters:   10/22/19 92/53   10/02/19 139/70   09/03/19 126/65    Wt Readings from Last 3 Encounters:   10/22/19 92.1 kg (203 lb)   10/02/19 91.2 kg (201 lb)   09/03/19 91.2 kg (201 lb 1.6 oz)                      Reviewed and updated as needed this visit by Provider  Tobacco  Allergies  Meds  Problems  Med Hx  Surg Hx  Fam Hx         Review of Systems   ROS COMP: Constitutional, HEENT, cardiovascular, pulmonary, gi and gu systems are negative, except as otherwise noted.      Objective    BP 92/53 (BP Location: Left arm, Cuff Size: Adult Large)   Pulse 61   Temp 98.2  F (36.8  C) (Oral)   Resp 20   Ht 1.676 m (5' 6\")   Wt 92.1 kg (203 lb)   SpO2 95%   BMI 32.77 kg/m    Body mass index is 32.77 kg/m .  Physical Exam   GENERAL APPEARANCE: healthy, alert and no distress     EYES: PERRL, sclera clear     HENT: nose and mouth without ulcers or lesions     NECK: no adenopathy, no asymmetry, masses, or scars and thyroid " normal to palpation     RESP: lungs clear to auscultation - no rales, rhonchi or wheezes     CV: regular rates and rhythm, normal S1 S2, no S3 or S4 and no murmur, click or rub      Abdomen: soft, nontender, no HSM or masses and bowel sounds normal     Ext: warm, dry, no edema      Psych: full range affect, normal speech and grooming, judgement and insight intact           Assessment & Plan       ICD-10-CM    1. Type 2 diabetes mellitus with hyperglycemia, with long-term current use of insulin (H) E11.65 Hemoglobin A1c    Z79.4 FOOT EXAM     Basic metabolic panel  (Ca, Cl, CO2, Creat, Gluc, K, Na, BUN)     Albumin Random Urine Quantitative with Creat Ratio   2. Essential hypertension with goal blood pressure less than 140/90 I10 Basic metabolic panel  (Ca, Cl, CO2, Creat, Gluc, K, Na, BUN)     Albumin Random Urine Quantitative with Creat Ratio   3. CKD (chronic kidney disease) stage 3, GFR 30-59 ml/min (H) N18.3    4. Gastroesophageal reflux disease without esophagitis K21.9    5. Urinary urgency R39.15    6. Arthralgia, unspecified joint M25.50    7. Flu vaccine need Z23 FLU VACCINE, INCREASED ANTIGEN, PRESV FREE     DM II- in good control now with A1C lower at 6.3 (from 7.2 in 4/19)- likely due to the addition of ozempic 0.5mg once weekly for the past couple months.  She is still also on metformin, basaglar, and novolog with meals.  Reviewed MTM notes from today after this visit- pt will try stopping metformin for a week and see if GI sx's improve.     HTN/CKD- GFR rebounded a bit last time after pt stopped NSAID use- will recheck today.  BP low today, but pt did not feel dizziness.  Will consider lower BP meds.    GERD- pt discussed with MTM, will switch from ranitidine to famotidine 20mg/d.  If GERD sx's persist/worsen, will increase to 40mg/d.    Osteopenia- DEXA with worsening compared to last scan in 2017, ordered by rheumatology.  Pt never used the fosamax rx'd in '17 from rheumatology due to concerns.  Open  "now, but now with concerns about her GFR/creatinine clearance.  Will see how BMP looks, and consider endocrinology referral unless GFR continuing to improve (then will discuss fosamax at next appt).    Rheum- cont f/u with Dr. Lopez.    Urology- cont f/u with Dr. Nassar.    Rib pain- f/u after concerning sx's a couple months ago- no persistent sx's per pt.       BMI:   Estimated body mass index is 32.77 kg/m  as calculated from the following:    Height as of this encounter: 1.676 m (5' 6\").    Weight as of this encounter: 92.1 kg (203 lb).   Weight management plan: Discussed healthy diet and exercise guidelines      Return in about 5 weeks (around 11/26/2019) for Blood Pressure Recheck, Diabetes.    Ilene Tristan MD  [facility]        "

## 2019-10-22 NOTE — PROGRESS NOTES
SUBJECTIVE/OBJECTIVE:                Betty Tee is a 79 year old female coming in for a follow-up visit for Medication Therapy Management.  She was referred to me from Dr. Tristan (whom she saw immediately prior to our visit). Her friend Yvette, joins her today.     Chief Complaint: Follow up from [facility] visit on 9/3.  Follow-up on diabetes/Ozempic start.    Tobacco: Not currently smoking.    Alcohol: Not currently drinking  Medication Adherence/Access: no issues reported    Diabetes:  Pt currently taking Metformin ER 500mg - 2 tabs with dinner, Ozempic 0.5mg once weekly, Basaglar 22 units daily and Novolog 6 units TID with meals. Pt is experiencing the following side effects: Stomach upset for 2-3 weeks. Describes this as discomfort, not pain. She has diarrhea every other day or every third day (not associated with pain). Fear of diarrhea is keeping her from leaving the house on some days. She is feeling full faster and then feeling hungry shortly after eating.   SMB-4 times daily.  Ranges (patient reported): 103-107 in the AM, into the 90's once. Did have one high blood sugar of 245 that was due to dietary indiscretion. Wondering what to do when this happens.   Patient is not experiencing hypoglycemia.  Recent symptoms of high blood sugar? none  Eye exam: up to date  Foot exam: up to date  Aspirin: Not taking due to Xarelto treatment  Diet/Exercise: Unchanged from previous visit.    Belching/GERD: Pt is experiencing belching in the morning along with stomach upset mentioned above. States that the belching is just air and that nothing comes up with it (she believes this is due to her CPAP). She is also wondering what she should take for heartburn since she cannot get ranitidine any longer and if she can take Tums for her stomach upset.     BP Readings from Last 1 Encounters:   10/22/19 92/53     Wt Readings from Last 1 Encounters:   10/22/19 203 lb (92.1 kg)     ASSESSMENT:                  Medication  Adherence: no issues identified    Diabetes: Improved. Fasting blood glucose at goal of  mg/dL per her report. Will get A1c drawn today. Pt may benefit from a trial period of stopping metformin to see if this improves stomach symptoms. Potassium supplement may also be to blame for this. This could be ozempic as well, but will start with this metformin first as it has a higher incidence of GI sx.   Explained that blood sugars may go up during this but that is okay.    Belching/GERD: Needs improvement. Pt is experiencing upset stomach - unknown if this is related to Ozempic but possible. It doesn't really sound like indigestion. However, she feels like tums may help settle her stomach, so will have her try this. To replace zantac, will have her try pepcid.      PLAN:                  1. Hold metformin for 1 week.    2. Try Tums for stomach upset    3. Stop ranitidine and start famotidine 20 mg once daily. Increase to 40 mg once daily if heartburn symptoms reoccur    I spent 20 minutes with this patient today. All changes were made via collaborative practice agreement with Dr. Tristan. A copy of the visit note was provided to the patient's primary care provider.     Will follow up in one week by phone.     The patient was given a summary of these recommendations as an after visit summary.    Lisbet RodríguezD IV Student  Sabrina Meek, MarcosD, JAMES, BCACP  MTM Pharmacist, [facility]

## 2019-10-22 NOTE — PATIENT INSTRUCTIONS
Recommendations from today's MTM visit:                                                      1. Hold Metformin for 1 week. Sabrina will call you in a week to see how your stomach symptoms are doing.     2. Try Tums for your stomach upset     3. Stop Zantac and start Pepcid (famotidine) 20mg once a day. If you start having heartburn symptoms, increase to famotidine 40mg once a day.     It was great to speak with you today.  I value your experience and would be very thankful for your time with providing feedback on our clinic survey. You may receive a survey via email or text message in the next few days.     Next MTM visit: Sabrina will call you next week.     To schedule another MTM appointment, please call the clinic directly or you may call the MTM scheduling line at [phone] or toll-free at [phone].     My Clinical Pharmacist's contact information:                                                      It was a pleasure talking with you today!  Please feel free to contact me with any questions or concerns you have.      Sabrina Meek, Pharm.D., M.B.A., BCACP  MTM Pharmacist, [facility]  Pager: [phone]  Email: [email or web address]

## 2019-10-22 NOTE — NURSING NOTE
"Chief Complaint   Patient presents with     Diabetes     zantac no longer available     initial BP 92/53 (BP Location: Left arm, Cuff Size: Adult Large)   Pulse 61   Temp 98.2  F (36.8  C) (Oral)   Resp 20   Ht 1.676 m (5' 6\")   Wt 92.1 kg (203 lb)   SpO2 95%   BMI 32.77 kg/m   Estimated body mass index is 32.77 kg/m  as calculated from the following:    Height as of this encounter: 1.676 m (5' 6\").    Weight as of this encounter: 92.1 kg (203 lb).  BP completed using cuff size: large.  L   arm      Health Maintenance that is potentially due pending provider review:  NONE    n/a    Taco Parra ma  "

## 2019-10-23 LAB
ANION GAP SERPL CALCULATED.3IONS-SCNC: 5 MMOL/L (ref 3–14)
BUN SERPL-MCNC: 21 MG/DL (ref 7–30)
CALCIUM SERPL-MCNC: 9.1 MG/DL (ref 8.5–10.1)
CHLORIDE SERPL-SCNC: 104 MMOL/L (ref 94–109)
CO2 SERPL-SCNC: 28 MMOL/L (ref 20–32)
CREAT SERPL-MCNC: 1.1 MG/DL (ref 0.52–1.04)
CREAT UR-MCNC: 34 MG/DL
GFR SERPL CREATININE-BSD FRML MDRD: 48 ML/MIN/{1.73_M2}
GLUCOSE SERPL-MCNC: 137 MG/DL (ref 70–99)
MICROALBUMIN UR-MCNC: 9 MG/L
MICROALBUMIN/CREAT UR: 25.48 MG/G CR (ref 0–25)
POTASSIUM SERPL-SCNC: 4.1 MMOL/L (ref 3.4–5.3)
SODIUM SERPL-SCNC: 137 MMOL/L (ref 133–144)

## 2019-10-30 ENCOUNTER — TELEPHONE (OUTPATIENT)
Dept: PHARMACY | Facility: CLINIC | Age: 79
End: 2019-10-30

## 2019-10-30 NOTE — TELEPHONE ENCOUNTER
Called pt to f/u on last MTM visit and Metformin hold. No answer, VM left.   Marcos NicoleD, JAMES, BCACP  MTM Pharmacist, [facility]

## 2019-11-11 NOTE — RESULT ENCOUNTER NOTE
Calling pt with results and f/u- able to reach her today and discussed results with pt.  GFR unfortunately down a bit again.  Had hoped having her stop NSAIDS would keep GFR >50.  Do wonder if her lower BP could be related.  Pt does not recall if she stopped her metformin, nor tracked her GI sx's as discussed at MTM visit on 10/22/19.  Recs-   Rec she stop the metformin, and f/u with a clinic visit in 2-3 wks to recheck GI sx's, glucose readings.  Also want to recheck her BP and BMP.  Pt agrees with plan.  Wrote down these instructions, will stop the metformin, and talk to Yvette about making an appt in 2-3 wks.  KP to Sabrina.  CW

## 2019-11-14 ENCOUNTER — OFFICE VISIT (OUTPATIENT)
Dept: PULMONOLOGY | Facility: CLINIC | Age: 79
End: 2019-11-14
Attending: INTERNAL MEDICINE
Payer: MEDICARE

## 2019-11-14 VITALS
HEIGHT: 66 IN | DIASTOLIC BLOOD PRESSURE: 77 MMHG | SYSTOLIC BLOOD PRESSURE: 122 MMHG | BODY MASS INDEX: 32.62 KG/M2 | HEART RATE: 62 BPM | RESPIRATION RATE: 18 BRPM | WEIGHT: 203 LBS | OXYGEN SATURATION: 96 %

## 2019-11-14 DIAGNOSIS — J44.89 FOLLICULAR BRONCHIOLITIS (H): Primary | ICD-10-CM

## 2019-11-14 DIAGNOSIS — J44.89 FOLLICULAR BRONCHIOLITIS (H): ICD-10-CM

## 2019-11-14 DIAGNOSIS — J30.1 SEASONAL ALLERGIC RHINITIS DUE TO POLLEN: ICD-10-CM

## 2019-11-14 PROCEDURE — G0463 HOSPITAL OUTPT CLINIC VISIT: HCPCS | Mod: ZF

## 2019-11-14 RX ORDER — FLUTICASONE PROPIONATE 50 MCG
1-2 SPRAY, SUSPENSION (ML) NASAL DAILY PRN
Qty: 18.2 ML | Refills: 11 | Status: SHIPPED | OUTPATIENT
Start: 2019-11-14 | End: 2022-04-25

## 2019-11-14 ASSESSMENT — PAIN SCALES - GENERAL: PAINLEVEL: NO PAIN (0)

## 2019-11-14 ASSESSMENT — MIFFLIN-ST. JEOR: SCORE: 1412.3

## 2019-11-14 NOTE — LETTER
"11/14/2019       RE: Betty Tee  [address]  [facility] [address]     Dear Colleague,    Thank you for referring your patient, Betty Tee, to the [facility] at [facility]. Please see a copy of my visit note below.    [facility]    Reason for Visit  Betty Tee is a 79 year old year old female who is being seen for Interstitial Lung Disease (ILD) (4 month follow up Sjogren's foll Bronchiolitis )    HPI    Sister Sita is a 79-year-old who has Sjogren's follicular bronchiolitis and history of ILD who is here for follow-up.  She had mild ILD on a previous chest CT scan in 2015, but follow-up chest CT scan in 2018 showed improvement with no obvious ILD present on the CT scan.  The follicular bronchiolitis changes were still present.  She has been on prednisone for her Sjogren's for 3 years or so, and that is managed by Dr. Webster in rheumatology.  Her current prednisone dose is 6 mg daily.    Today, she reports that her breathing feels \"better.\"  She does feel short of breath when she walks up one flight of stairs or carrying the groceries into her house.  She also endorses occasional wheezing and morning cough.  She denies fevers or chest pain.  Her prednisone dose is 6 mg daily.  She takes Flovent inhaler and daily azithromycin as anti-inflammatories for her follicular bronchiolitis.  She does not use her albuterol because she forgets to use it.  It has been so long since she last used albuterol, she does not remember if it helped or not.  She does endorse pain in various joints that comes and goes.  She did receive her flu vaccine.        Current Outpatient Medications   Medication     fluticasone (FLONASE) 50 MCG/ACT nasal spray     fluticasone-vilanterol (BREO ELLIPTA) 100-25 MCG/INH inhaler     acetaminophen (TYLENOL) 500 MG tablet     acyclovir (ZOVIRAX) 400 MG " tablet     acyclovir (ZOVIRAX) 5 % external ointment     albuterol (PROAIR HFA, PROVENTIL HFA, VENTOLIN HFA) 108 (90 BASE) MCG/ACT inhaler     atorvastatin (LIPITOR) 10 MG tablet     azithromycin (ZITHROMAX) 250 MG tablet     BD JANE U/F 32G X 4 MM insulin pen needle     blood glucose (ACCU-CHEK SHANNON PLUS) test strip     blood glucose monitoring (ACCU-CHEK FASTCLIX) lancets     blood glucose monitoring (ACCU-CHEK MULTICLIX) lancets     cevimeline (EVOXAC) 30 MG capsule     COMPRESSION STOCKINGS     escitalopram (LEXAPRO) 20 MG tablet     Humidifier MISC     hydroxychloroquine (PLAQUENIL) 200 MG tablet     insulin glargine (BASAGLAR KWIKPEN) 100 UNIT/ML pen     ketoconazole (NIZORAL) 2 % external cream     lidocaine (LIDODERM) 5 % patch     loratadine (CLARITIN) 10 MG tablet     metFORMIN (GLUCOPHAGE-XR) 500 MG 24 hr tablet     metoprolol succinate (TOPROL XL) 25 MG 24 hr tablet     metoprolol succinate (TOPROL-XL) 100 MG 24 hr tablet     montelukast (SINGULAIR) 10 MG tablet     NOVOLOG FLEXPEN 100 UNIT/ML soln     order for DME     order for DME     order for DME     order for DME     potassium chloride ER (KLOR-CON) 20 MEQ CR tablet     predniSONE (DELTASONE) 1 MG tablet     predniSONE (DELTASONE) 5 MG tablet     rivaroxaban ANTICOAGULANT (XARELTO) 20 MG TABS tablet     Semaglutide,0.25 or 0.5MG/DOS, (OZEMPIC, 0.25 OR 0.5 MG/DOSE,) 2 MG/1.5ML SOPN     spironolactone (ALDACTONE) 25 MG tablet     tiZANidine (ZANAFLEX) 2 MG tablet     torsemide (DEMADEX) 10 MG tablet     torsemide (DEMADEX) 20 MG tablet     traZODone (DESYREL) 50 MG tablet     vitamin D2 (ERGOCALCIFEROL) 07820 units (1250 mcg) capsule     No current facility-administered medications for this visit.      Allergies   Allergen Reactions     Amoxicillin-Pot Clavulanate      Augmentin Nausea and Vomiting     Codeine Nausea and Vomiting     Codeine      PN: LW Reaction: HIVES     Penicillins Nausea and Vomiting     PN: LW Reaction: GI Upset      Phenobarbital Itching     Phenobarbital      Seasonal Allergies      Past Medical History:   Diagnosis Date     Alcohol abuse, in remission      Allergic rhinitis, cause unspecified     allegra helps when she takes it     Antiplatelet or antithrombotic long-term use      Atrial fibrillation (H)     in hosp in 11/11 after surgery w/ fluid overload     Cardiomegaly     LVH on stress echo- cardiac w/u at [facility] ER- neg CT scan for PE, neg stress echo in 8/06     Chest pain, unspecified      Disorder of bone and cartilage, unspecified     osteopenia (had been on prempro), improved on 6/06 dexa, stable dexa 11/10     Diverticulosis of colon (without mention of hemorrhage)     last episode yrs ago     Essential hypertension, benign      Follicular bronchiolitis (H)     associated with Sjogrens, dx by chest CT showing mosaic attenuation and air trapping     Gastro-oesophageal reflux disease      ILD (interstitial lung disease) (H)     associated with Sjogrens, also has mildly elevated IgG4, first noted on chest CT 2015 (mild changes) and also has small airways disease; ILD improved on follow up chest CT 2018.     Insomnia, unspecified     weaned off clonazepam     Irregular heart beat      Lumbago 7/09    MRI with NEAL, now seeing Dr. Cain for sciatic sx's     Major depressive disorder, recurrent episode, moderate (H)      Obstructive sleep apnea      Osteoarthrosis, unspecified whether generalized or localized, unspecified site      Sjogren's syndrome (H)     + RG and SSA and lip bx     Sleep apnea      Tobacco use disorder     chantix in 9/07, started again in 6/08, working       Past Surgical History:   Procedure Laterality Date     BACK SURGERY  1962     BIOPSY BREAST  9/27/02    Biopsy Left Breast     C APPENDECTOMY  1970's?     C NONSPECIFIC PROCEDURE  11/05    exploratory abd lap, adhesions, resolved RLQ pain, diverticulitis episodes     CARDIAC SURGERY       CHOLECYSTECTOMY  1990's?     COLECTOMY LEFT  11/7/2011     Procedure:COLECTOMY LEFT; Laparoscopic mobilization of splenic flexture, sigmoid colectomy, coloprotoscopy, loop illeostomy; Surgeon:CK CASTANEDA; Location:[facility] OR     HYSTERECTOMY TOTAL ABDOMINAL, BILATERAL SALPINGO-OOPHORECTOMY, COMBINED  2011    Procedure:COMBINED HYSTERECTOMY TOTAL ABDOMINAL, BILATERAL SALPINGO-OOPHORECTOMY; total abdominal hysterectomy, bilateral salpingo-oophorectomy; Surgeon:ALETA MANUEL; Location:[facility] OR     INSERT STENT URETER  2011    Procedure:INSERT STENT URETER; Placement of Bilateral Ureteral Stents ; Surgeon:PRANEETH BRYANT; Location:[facility] OR     SIGMOIDOSCOPY FLEXIBLE  11/3/2011    Procedure:SIGMOIDOSCOPY FLEXIBLE; Flexible Sigmoidoscopy; Surgeon:CK CASTANEDA; Location:[facility] OR     TAKEDOWN ILEOSTOMY  2012    Procedure:TAKEDOWN ILEOSTOMY; Takedown Loop Ileostomy ; Surgeon:CK CASTANEDA; Location:[facility] OR       Social History     Socioeconomic History     Marital status: Single     Spouse name: Not on file     Number of children: 0     Years of education: Ed Spec De     Highest education level: Not on file   Occupational History     Occupation: Professor     Employer: [employer]     Comment: [employer]- Education   Social Needs     Financial resource strain: Not on file     Food insecurity:     Worry: Not on file     Inability: Not on file     Transportation needs:     Medical: Not on file     Non-medical: Not on file   Tobacco Use     Smoking status: Former Smoker     Packs/day: 0.50     Years: 10.00     Pack years: 5.00     Types: Cigarettes     Last attempt to quit: 2011     Years since quittin.2     Smokeless tobacco: Never Used     Tobacco comment:  ppd   Substance and Sexual Activity     Alcohol use: No     Alcohol/week: 0.0 standard drinks     Comment: In recovery beginning      Drug use: No     Sexual activity: Never   Lifestyle     Physical activity:     Days per week: Not on file     Minutes per session:  Not on file     Stress: Not on file   Relationships     Social connections:     Talks on phone: Not on file     Gets together: Not on file     Attends [employer] service: Not on file     Active member of club or organization: Not on file     Attends meetings of clubs or organizations: Not on file     Relationship status: Not on file     Intimate partner violence:     Fear of current or ex partner: Not on file     Emotionally abused: Not on file     Physically abused: Not on file     Forced sexual activity: Not on file   Other Topics Concern     Parent/sibling w/ CABG, MI or angioplasty before 65F 55M? Not Asked   Social History Narrative                   Family History   Problem Relation Age of Onset     C.A.D. Mother 63        MI- first at age 63     Heart Disease Mother      Hypertension Mother      Cerebrovascular Disease Mother      Hyperlipidemia Mother      Alcohol/Drug Father      Alzheimer Disease Father      Dementia Father      Hypertension Father      Hyperlipidemia Father      Diabetes Sister      C.A.D. Sister 52        Minor MI- age 50's     Heart Disease Sister      Hypertension Sister      Hypertension Sister      Hypertension Brother      Cancer - colorectal Sister 48        Late 40's early 50's     Prostate Cancer Brother 74        Dx'd age 74     Gastrointestinal Disease Sister         Diverticulitis     Gastrointestinal Disease Brother         Diverticulitis     Lipids Sister      Lipids Sister      Parkinsonism Brother      Diabetes Sister      Heart Disease Sister         CHF     Cancer Sister         lung, smoker     Substance Abuse Sister      Substance Abuse Brother      Asthma Sister      Cancer Sister      Breast Cancer Daughter      Prostate Cancer Brother      Hyperlipidemia Brother      Diabetes Other      Hypertension Other              ROS Pulmonary  A complete ROS was otherwise negative except as noted in the HPI.    Vitals: /77 (BP Location: Right arm, Patient Position: Chair,  "Cuff Size: Adult Large)   Pulse 62   Resp 18   Ht 1.676 m (5' 5.98\")   Wt 92.1 kg (203 lb)   SpO2 96%   BMI 32.78 kg/m       Exam:   GENERAL APPEARANCE: Well developed, well nourished, alert, and in no apparent distress.  RESP: good air flow throughout.  No crackles. No rhonchi. No wheezes.  No inspiratory squeaks.  CV: Normal S1, S2, regular rhythm, normal rate. No murmur.  No LE edema.   MS: extremities normal. No clubbing. No cyanosis.    SKIN: no rash on limited exam.  NEURO: Mentation intact, speech normal, normal stance.  PSYCH: mentation appears normal. and affect normal/bright.    Results:  Recent Results (from the past 168 hour(s))   General PFT Lab (Please always keep checked)    Collection Time: 11/14/19 10:40 AM   Result Value Ref Range    FVC-Pred 2.71 L    FVC-Pre 1.90 L    FVC-%Pred-Pre 69 %    FEV1-Pre 1.52 L    FEV1-%Pred-Pre 74 %    FEV1FVC-Pred 77 %    FEV1FVC-Pre 80 %    FEFMax-Pred 5.12 L/sec    FEFMax-Pre 5.57 L/sec    FEFMax-%Pred-Pre 108 %    FEF2575-Pred 1.63 L/sec    FEF2575-Pre 1.47 L/sec    AOQ7091-%Pred-Pre 90 %    ExpTime-Pre 6.77 sec    FIFMax-Pre 3.35 L/sec    VC-Pred 3.06 L    VC-Pre 1.91 L    VC-%Pred-Pre 62 %    IC-Pred 2.77 L    IC-Pre 1.66 L    IC-%Pred-Pre 59 %    ERV-Pred 0.29 L    ERV-Pre 0.25 L    ERV-%Pred-Pre 87 %    FEV1FEV6-Pred 78 %    FEV1FEV6-Pre 80 %    FRCPleth-Pred 2.81 L    FRCPleth-Pre 2.41 L    FRCPleth-%Pred-Pre 85 %    RVPleth-Pred 2.28 L    RVPleth-Pre 2.15 L    RVPleth-%Pred-Pre 94 %    TLCPleth-Pred 5.19 L    TLCPleth-Pre 4.06 L    TLCPleth-%Pred-Pre 78 %    DLCOunc-Pred 19.83 ml/min/mmHg    DLCOunc-Pre 15.90 ml/min/mmHg    DLCOunc-%Pred-Pre 80 %    VA-Pre 3.34 L    VA-%Pred-Pre 67 %    FEV1SVC-Pred 67 %    FEV1SVC-Pre 80 %       I reviewed pulmonary function test that was performed today.  This shows nonspecific spirometry pattern with normal ratio and normal lung volumes suggestive of airflow obstruction.  Diffusion is normal.  Lung function is " improved compared to 4 months ago.    I reviewed results with the patient.      Assessment and plan:   Sister Sita is a 79-year-old with Sjogren's follicular bronchiolitis and history of mild ILD who is here for follow-up.  1.  Sjogren's with follicular bronchiolitis and history of ILD.  She originally had ILD on chest CT scan 2015, but repeat CT scan 2018 showed improvement in the ILD with persistence of follicular bronchiolitis changes.  Her PFT is improved today and is suggestive of airflow obstruction.  Given her symptoms of dyspnea on exertion and occasional wheezing and cough, I think it is reasonable to try an ICS/LABA combination inhaler to replace the ICS inhaler.  She will continue daily azithromycin as an airway anti-inflammatory for her follicular bronchiolitis.  She will continue to use the albuterol inhaler as a rescue inhaler.  Given the stability of her lung function, I would not add mycophenolate or rituximab to her regimen at this time as her lung disease appears stable and mild.  In addition, her last chest CT scan showed improvement in her ILD, perhaps secondary to her prednisone.  I will start Breo Ellipta inhaler (fluticasone-Vilanterol) 1 puff daily.  I will remove Flovent from her medication list.  She will return in 4 months with repeat full PFT.  I did remind her to gargle with water after using the Breo Ellipta inhaler.  2.  Medication monitoring.  I will check an EKG at her next visit to monitor azithromycin.  3.  Hypoxia.  She will continue oxygen 4 L/min.    Again, thank you for allowing me to participate in the care of your patient.      Sincerely,    Maryjane Tillman MD

## 2019-11-14 NOTE — NURSING NOTE
Chief Complaint   Patient presents with     Interstitial Lung Disease (ILD)     4 month follow up Sjogren's foll Bronchiolitis      Medications reviewed and updated.  Vitals taken  Judi Carter CMA

## 2019-11-14 NOTE — PROGRESS NOTES
"[facility]    Reason for Visit  Betty Tee is a 79 year old year old female who is being seen for Interstitial Lung Disease (ILD) (4 month follow up Sjogren's foll Bronchiolitis )    HPI    Sister Sita is a 79-year-old who has Sjogren's follicular bronchiolitis and history of ILD who is here for follow-up.  She had mild ILD on a previous chest CT scan in 2015, but follow-up chest CT scan in 2018 showed improvement with no obvious ILD present on the CT scan.  The follicular bronchiolitis changes were still present.  She has been on prednisone for her Sjogren's for 3 years or so, and that is managed by Dr. Webster in rheumatology.  Her current prednisone dose is 6 mg daily.    Today, she reports that her breathing feels \"better.\"  She does feel short of breath when she walks up one flight of stairs or carrying the groceries into her house.  She also endorses occasional wheezing and morning cough.  She denies fevers or chest pain.  Her prednisone dose is 6 mg daily.  She takes Flovent inhaler and daily azithromycin as anti-inflammatories for her follicular bronchiolitis.  She does not use her albuterol because she forgets to use it.  It has been so long since she last used albuterol, she does not remember if it helped or not.  She does endorse pain in various joints that comes and goes.  She did receive her flu vaccine.        Current Outpatient Medications   Medication     fluticasone (FLONASE) 50 MCG/ACT nasal spray     fluticasone-vilanterol (BREO ELLIPTA) 100-25 MCG/INH inhaler     acetaminophen (TYLENOL) 500 MG tablet     acyclovir (ZOVIRAX) 400 MG tablet     acyclovir (ZOVIRAX) 5 % external ointment     albuterol (PROAIR HFA, PROVENTIL HFA, VENTOLIN HFA) 108 (90 BASE) MCG/ACT inhaler     atorvastatin (LIPITOR) 10 MG tablet     azithromycin (ZITHROMAX) 250 MG tablet     BD JANE U/F 32G X 4 MM insulin pen needle     blood glucose (ACCU-CHEK SHANNON PLUS) test " strip     blood glucose monitoring (ACCU-CHEK FASTCLIX) lancets     blood glucose monitoring (ACCU-CHEK MULTICLIX) lancets     cevimeline (EVOXAC) 30 MG capsule     COMPRESSION STOCKINGS     escitalopram (LEXAPRO) 20 MG tablet     Humidifier MISC     hydroxychloroquine (PLAQUENIL) 200 MG tablet     insulin glargine (BASAGLAR KWIKPEN) 100 UNIT/ML pen     ketoconazole (NIZORAL) 2 % external cream     lidocaine (LIDODERM) 5 % patch     loratadine (CLARITIN) 10 MG tablet     metFORMIN (GLUCOPHAGE-XR) 500 MG 24 hr tablet     metoprolol succinate (TOPROL XL) 25 MG 24 hr tablet     metoprolol succinate (TOPROL-XL) 100 MG 24 hr tablet     montelukast (SINGULAIR) 10 MG tablet     NOVOLOG FLEXPEN 100 UNIT/ML soln     order for DME     order for DME     order for DME     order for DME     potassium chloride ER (KLOR-CON) 20 MEQ CR tablet     predniSONE (DELTASONE) 1 MG tablet     predniSONE (DELTASONE) 5 MG tablet     rivaroxaban ANTICOAGULANT (XARELTO) 20 MG TABS tablet     Semaglutide,0.25 or 0.5MG/DOS, (OZEMPIC, 0.25 OR 0.5 MG/DOSE,) 2 MG/1.5ML SOPN     spironolactone (ALDACTONE) 25 MG tablet     tiZANidine (ZANAFLEX) 2 MG tablet     torsemide (DEMADEX) 10 MG tablet     torsemide (DEMADEX) 20 MG tablet     traZODone (DESYREL) 50 MG tablet     vitamin D2 (ERGOCALCIFEROL) 92639 units (1250 mcg) capsule     No current facility-administered medications for this visit.      Allergies   Allergen Reactions     Amoxicillin-Pot Clavulanate      Augmentin Nausea and Vomiting     Codeine Nausea and Vomiting     Codeine      PN: LW Reaction: HIVES     Penicillins Nausea and Vomiting     PN: LW Reaction: GI Upset     Phenobarbital Itching     Phenobarbital      Seasonal Allergies      Past Medical History:   Diagnosis Date     Alcohol abuse, in remission      Allergic rhinitis, cause unspecified     allegra helps when she takes it     Antiplatelet or antithrombotic long-term use      Atrial fibrillation (H)     in hosp in 11/11 after  surgery w/ fluid overload     Cardiomegaly     LVH on stress echo- cardiac w/u at [facility] ER- neg CT scan for PE, neg stress echo in 8/06     Chest pain, unspecified      Disorder of bone and cartilage, unspecified     osteopenia (had been on prempro), improved on 6/06 dexa, stable dexa 11/10     Diverticulosis of colon (without mention of hemorrhage)     last episode yrs ago     Essential hypertension, benign      Follicular bronchiolitis (H)     associated with Sjogrens, dx by chest CT showing mosaic attenuation and air trapping     Gastro-oesophageal reflux disease      ILD (interstitial lung disease) (H)     associated with Sjogrens, also has mildly elevated IgG4, first noted on chest CT 2015 (mild changes) and also has small airways disease; ILD improved on follow up chest CT 2018.     Insomnia, unspecified     weaned off clonazepam     Irregular heart beat      Lumbago 7/09    MRI with DJD, now seeing Dr. Cain for sciatic sx's     Major depressive disorder, recurrent episode, moderate (H)      Obstructive sleep apnea      Osteoarthrosis, unspecified whether generalized or localized, unspecified site      Sjogren's syndrome (H)     + RG and SSA and lip bx     Sleep apnea      Tobacco use disorder     chantix in 9/07, started again in 6/08, working       Past Surgical History:   Procedure Laterality Date     BACK SURGERY  1962     BIOPSY BREAST  9/27/02    Biopsy Left Breast     C APPENDECTOMY  1970's?     C NONSPECIFIC PROCEDURE  11/05    exploratory abd lap, adhesions, resolved RLQ pain, diverticulitis episodes     CARDIAC SURGERY       CHOLECYSTECTOMY  1990's?     COLECTOMY LEFT  11/7/2011    Procedure:COLECTOMY LEFT; Laparoscopic mobilization of splenic flexture, sigmoid colectomy, coloprotoscopy, loop illeostomy; Surgeon:CK CASTANEDA; Location:[facility] OR     HYSTERECTOMY TOTAL ABDOMINAL, BILATERAL SALPINGO-OOPHORECTOMY, COMBINED  11/7/2011    Procedure:COMBINED HYSTERECTOMY TOTAL ABDOMINAL, BILATERAL  SALPINGO-OOPHORECTOMY; total abdominal hysterectomy, bilateral salpingo-oophorectomy; Surgeon:ALETA MANUEL; Location:[facility] OR     INSERT STENT URETER  2011    Procedure:INSERT STENT URETER; Placement of Bilateral Ureteral Stents ; Surgeon:PRANEETH BRYANT; Location:[facility] OR     SIGMOIDOSCOPY FLEXIBLE  11/3/2011    Procedure:SIGMOIDOSCOPY FLEXIBLE; Flexible Sigmoidoscopy; Surgeon:CK CASTANEDA; Location:[facility] OR     TAKEDOWN ILEOSTOMY  2012    Procedure:TAKEDOWN ILEOSTOMY; Takedown Loop Ileostomy ; Surgeon:CK CASTANEDA; Location:[facility] OR       Social History     Socioeconomic History     Marital status: Single     Spouse name: Not on file     Number of children: 0     Years of education: Ed Spec De     Highest education level: Not on file   Occupational History     Occupation: Professor     Employer: [employer]     Comment: [employer]- [employer]   Social Needs     Financial resource strain: Not on file     Food insecurity:     Worry: Not on file     Inability: Not on file     Transportation needs:     Medical: Not on file     Non-medical: Not on file   Tobacco Use     Smoking status: Former Smoker     Packs/day: 0.50     Years: 10.00     Pack years: 5.00     Types: Cigarettes     Last attempt to quit: 2011     Years since quittin.2     Smokeless tobacco: Never Used     Tobacco comment:  ppd   Substance and Sexual Activity     Alcohol use: No     Alcohol/week: 0.0 standard drinks     Comment: In recovery beginning      Drug use: No     Sexual activity: Never   Lifestyle     Physical activity:     Days per week: Not on file     Minutes per session: Not on file     Stress: Not on file   Relationships     Social connections:     Talks on phone: Not on file     Gets together: Not on file     Attends Voodoo service: Not on file     Active member of club or organization: Not on file     Attends meetings of clubs or organizations: Not on file     Relationship  "status: Not on file     Intimate partner violence:     Fear of current or ex partner: Not on file     Emotionally abused: Not on file     Physically abused: Not on file     Forced sexual activity: Not on file   Other Topics Concern     Parent/sibling w/ CABG, MI or angioplasty before 65F 55M? Not Asked   Social History Narrative                   Family History   Problem Relation Age of Onset     C.A.D. Mother 63        MI- first at age 63     Heart Disease Mother      Hypertension Mother      Cerebrovascular Disease Mother      Hyperlipidemia Mother      Alcohol/Drug Father      Alzheimer Disease Father      Dementia Father      Hypertension Father      Hyperlipidemia Father      Diabetes Sister      C.A.D. Sister 52        Minor MI- age 50's     Heart Disease Sister      Hypertension Sister      Hypertension Sister      Hypertension Brother      Cancer - colorectal Sister 48        Late 40's early 50's     Prostate Cancer Brother 74        Dx'd age 74     Gastrointestinal Disease Sister         Diverticulitis     Gastrointestinal Disease Brother         Diverticulitis     Lipids Sister      Lipids Sister      Parkinsonism Brother      Diabetes Sister      Heart Disease Sister         CHF     Cancer Sister         lung, smoker     Substance Abuse Sister      Substance Abuse Brother      Asthma Sister      Cancer Sister      Breast Cancer Daughter      Prostate Cancer Brother      Hyperlipidemia Brother      Diabetes Other      Hypertension Other              ROS Pulmonary  A complete ROS was otherwise negative except as noted in the HPI.    Vitals: /77 (BP Location: Right arm, Patient Position: Chair, Cuff Size: Adult Large)   Pulse 62   Resp 18   Ht 1.676 m (5' 5.98\")   Wt 92.1 kg (203 lb)   SpO2 96%   BMI 32.78 kg/m      Exam:   GENERAL APPEARANCE: Well developed, well nourished, alert, and in no apparent distress.  RESP: good air flow throughout.  No crackles. No rhonchi. No wheezes.  No inspiratory " squeaks.  CV: Normal S1, S2, regular rhythm, normal rate. No murmur.  No LE edema.   MS: extremities normal. No clubbing. No cyanosis.    SKIN: no rash on limited exam.  NEURO: Mentation intact, speech normal, normal stance.  PSYCH: mentation appears normal. and affect normal/bright.    Results:  Recent Results (from the past 168 hour(s))   General PFT Lab (Please always keep checked)    Collection Time: 11/14/19 10:40 AM   Result Value Ref Range    FVC-Pred 2.71 L    FVC-Pre 1.90 L    FVC-%Pred-Pre 69 %    FEV1-Pre 1.52 L    FEV1-%Pred-Pre 74 %    FEV1FVC-Pred 77 %    FEV1FVC-Pre 80 %    FEFMax-Pred 5.12 L/sec    FEFMax-Pre 5.57 L/sec    FEFMax-%Pred-Pre 108 %    FEF2575-Pred 1.63 L/sec    FEF2575-Pre 1.47 L/sec    OYJ2128-%Pred-Pre 90 %    ExpTime-Pre 6.77 sec    FIFMax-Pre 3.35 L/sec    VC-Pred 3.06 L    VC-Pre 1.91 L    VC-%Pred-Pre 62 %    IC-Pred 2.77 L    IC-Pre 1.66 L    IC-%Pred-Pre 59 %    ERV-Pred 0.29 L    ERV-Pre 0.25 L    ERV-%Pred-Pre 87 %    FEV1FEV6-Pred 78 %    FEV1FEV6-Pre 80 %    FRCPleth-Pred 2.81 L    FRCPleth-Pre 2.41 L    FRCPleth-%Pred-Pre 85 %    RVPleth-Pred 2.28 L    RVPleth-Pre 2.15 L    RVPleth-%Pred-Pre 94 %    TLCPleth-Pred 5.19 L    TLCPleth-Pre 4.06 L    TLCPleth-%Pred-Pre 78 %    DLCOunc-Pred 19.83 ml/min/mmHg    DLCOunc-Pre 15.90 ml/min/mmHg    DLCOunc-%Pred-Pre 80 %    VA-Pre 3.34 L    VA-%Pred-Pre 67 %    FEV1SVC-Pred 67 %    FEV1SVC-Pre 80 %       I reviewed pulmonary function test that was performed today.  This shows nonspecific spirometry pattern with normal ratio and normal lung volumes suggestive of airflow obstruction.  Diffusion is normal.  Lung function is improved compared to 4 months ago.    I reviewed results with the patient.      Assessment and plan:   Sister Sita is a 79-year-old with Sjogren's follicular bronchiolitis and history of mild ILD who is here for follow-up.  1.  Sjogren's with follicular bronchiolitis and history of ILD.  She originally had ILD on  chest CT scan 2015, but repeat CT scan 2018 showed improvement in the ILD with persistence of follicular bronchiolitis changes.  Her PFT is improved today and is suggestive of airflow obstruction.  Given her symptoms of dyspnea on exertion and occasional wheezing and cough, I think it is reasonable to try an ICS/LABA combination inhaler to replace the ICS inhaler.  She will continue daily azithromycin as an airway anti-inflammatory for her follicular bronchiolitis.  She will continue to use the albuterol inhaler as a rescue inhaler.  Given the stability of her lung function, I would not add mycophenolate or rituximab to her regimen at this time as her lung disease appears stable and mild.  In addition, her last chest CT scan showed improvement in her ILD, perhaps secondary to her prednisone.  I will start Breo Ellipta inhaler (fluticasone-Vilanterol) 1 puff daily.  I will remove Flovent from her medication list.  She will return in 4 months with repeat full PFT.  I did remind her to gargle with water after using the Breo Ellipta inhaler.  2.  Medication monitoring.  I will check an EKG at her next visit to monitor azithromycin.  3.  Hypoxia.  She will continue oxygen 4 L/min.

## 2019-11-18 LAB
DLCOUNC-%PRED-PRE: 80 %
DLCOUNC-PRE: 15.9 ML/MIN/MMHG
DLCOUNC-PRED: 19.83 ML/MIN/MMHG
ERV-%PRED-PRE: 87 %
ERV-PRE: 0.25 L
ERV-PRED: 0.29 L
EXPTIME-PRE: 6.77 SEC
FEF2575-%PRED-PRE: 90 %
FEF2575-PRE: 1.47 L/SEC
FEF2575-PRED: 1.63 L/SEC
FEFMAX-%PRED-PRE: 108 %
FEFMAX-PRE: 5.57 L/SEC
FEFMAX-PRED: 5.12 L/SEC
FEV1-%PRED-PRE: 74 %
FEV1-PRE: 1.52 L
FEV1FEV6-PRE: 80 %
FEV1FEV6-PRED: 78 %
FEV1FVC-PRE: 80 %
FEV1FVC-PRED: 77 %
FEV1SVC-PRE: 80 %
FEV1SVC-PRED: 67 %
FIFMAX-PRE: 3.35 L/SEC
FRCPLETH-%PRED-PRE: 85 %
FRCPLETH-PRE: 2.41 L
FRCPLETH-PRED: 2.81 L
FVC-%PRED-PRE: 69 %
FVC-PRE: 1.9 L
FVC-PRED: 2.71 L
IC-%PRED-PRE: 59 %
IC-PRE: 1.66 L
IC-PRED: 2.77 L
RVPLETH-%PRED-PRE: 94 %
RVPLETH-PRE: 2.15 L
RVPLETH-PRED: 2.28 L
TLCPLETH-%PRED-PRE: 78 %
TLCPLETH-PRE: 4.06 L
TLCPLETH-PRED: 5.19 L
VA-%PRED-PRE: 67 %
VA-PRE: 3.34 L
VC-%PRED-PRE: 62 %
VC-PRE: 1.91 L
VC-PRED: 3.06 L

## 2019-11-19 NOTE — TELEPHONE ENCOUNTER
Called pt again - thinks stomach has been a bit better, has been keeping track in a log. Will have her continue to hold metformin f/u with patient when she's here on 12/3 to see CW.     Sabrina Meek, PharmD, JAMES, BCACP  MTM Pharmacist, [facility]

## 2019-11-20 DIAGNOSIS — I50.30 (HFPEF) HEART FAILURE WITH PRESERVED EJECTION FRACTION (H): ICD-10-CM

## 2019-11-20 DIAGNOSIS — E78.5 HYPERLIPIDEMIA LDL GOAL <100: ICD-10-CM

## 2019-11-20 DIAGNOSIS — I48.19 PERSISTENT ATRIAL FIBRILLATION (H): ICD-10-CM

## 2019-11-20 DIAGNOSIS — E11.69 TYPE 2 DIABETES MELLITUS WITH OTHER SPECIFIED COMPLICATION (H): ICD-10-CM

## 2019-11-20 RX ORDER — ATORVASTATIN CALCIUM 10 MG/1
TABLET, FILM COATED ORAL
Qty: 90 TABLET | Refills: 1 | Status: SHIPPED | OUTPATIENT
Start: 2019-11-20 | End: 2020-02-06

## 2019-11-20 NOTE — TELEPHONE ENCOUNTER
CW    Routing refill request to provider for review/approval because:  Drug interaction warning:Warnings  (1)   Drug/Drug   Very High   Drug-Drug: ketoconazole and atorvastatin   Plasma concentrations of statins (i.e. atorvastatin, lovastatin and simvastatin) may be increased when co-administered with imidazoles (itraconazole, ketoconazole and posaconazole). Adverse effects, including myopathy and rhabdomyolysis may occur. Coadministration may be contraindicated in official package labeling.   Details       Thanks,  Sophia Hemphill RN

## 2019-11-20 NOTE — TELEPHONE ENCOUNTER
"Lipitor 10MG   Last Written Prescription Date:  08/28/2019  Last Fill Quantity: 90,  # refills: 0   Last office visit: 10/22/2019 with prescribing provider:  Yes    Future Office Visit:   Next 5 appointments (look out 90 days)    Dec 03, 2019 10:00 AM CST  Office Visit with Ilene Tristan MD  [facility] ([facility]) [address] [place]  [facility] [address]  [phone]   Dec 03, 2019 10:30 AM CST  SHORT with Sabrina Meek [facility] ([facility]) [address]  [address]  [facility] [address]  [phone]   Feb 04, 2020  1:00 PM CST  Office Visit with Ilene Tristan MD  [facility] ([facility]) [address] [place]  [facility] [address]  [phone]         Requested Prescriptions   Pending Prescriptions Disp Refills     atorvastatin (LIPITOR) 10 MG tablet [Pharmacy Med Name: ATORVASTATIN 10 MG TABLET] 90 tablet 0     Sig: TAKE 1 TABLET BY MOUTH EVERY DAY       Statins Protocol Passed - 11/20/2019  8:44 AM        Passed - LDL on file in past 12 months     Recent Labs   Lab Test 04/09/19  1017   LDL 29             Passed - No abnormal creatine kinase in past 12 months     Recent Labs   Lab Test 07/24/15  1236   CKT 57                Passed - Recent (12 mo) or future (30 days) visit within the authorizing provider's specialty     Patient has had an office visit with the authorizing provider or a provider within the authorizing providers department within the previous 12 mos or has a future within next 30 days. See \"Patient Info\" tab in inbasket, or \"Choose Columns\" in Meds & Orders section of the refill encounter.              Passed - Medication is active on med list        Passed - Patient is age 18 or older        Passed - No active pregnancy on record        Passed - No positive pregnancy test in past 12 months        "

## 2019-11-20 NOTE — TELEPHONE ENCOUNTER
Novolog Flexpen 100unit  Last Written Prescription Date:  08/02/2019  Last Fill Quantity: 15ml,  # refills: 1   Last office visit: 10/22/2019 with prescribing provider:  Yes    Future Office Visit:   Next 5 appointments (look out 90 days)    Dec 03, 2019 10:00 AM CST  Office Visit with Ilene Tristan MD  [facility] ([facility]) [address] [place]  [facility] [address]  [phone]   Dec 03, 2019 10:30 AM CST  SHORT with Sabrina Meek [facility] ([facility]) [address]  [address]  [facility] [address]  [phone]   Feb 04, 2020  1:00 PM CST  Office Visit with Ilene Tristan MD  [facility] ([facility]) [address] [place]  [facility] [address]  [phone]           Requested Prescriptions   Pending Prescriptions Disp Refills     NOVOLOG FLEXPEN 100 UNIT/ML soln [Pharmacy Med Name: NOVOLOG 100 UNIT/ML FLEXPEN]  1     Sig: INJECT 12 UNITS SUBCUTANEOUS 3 TIMES DAILY (WITH MEALS)       Short Acting Insulin Protocol Passed - 11/20/2019  9:18 AM        Passed - Blood pressure less than 140/90 in past 6 months     BP Readings from Last 3 Encounters:   11/14/19 122/77   10/22/19 92/53   10/02/19 139/70                 Passed - LDL on file in past 12 months     Recent Labs   Lab Test 04/09/19  1017   LDL 29             Passed - Microalbumin on file in past 12 months     Recent Labs   Lab Test 10/22/19  1414   MICROL 9   UMALCR 25.48*             Passed - Serum creatinine on file in past 12 months     Recent Labs   Lab Test 10/22/19  1416  04/02/17  2213   CR 1.10*   < >  --    CREAT  --   --  0.6    < > = values in this interval not displayed.             Passed - HgbA1C in past 3 or 6 months     If HgbA1C is 8 or greater, it needs to be on file within the past 3 months.  If less than 8, must be on file within the past 6 months.     Recent Labs   Lab Test 10/22/19  1416   A1C  "6.3*             Passed - Medication is active on med list        Passed - Patient is age 18 or older        Passed - Recent (6 mo) or future (30 days) visit within the authorizing provider's specialty     Patient had office visit in the last 6 months or has a visit in the next 30 days with authorizing provider or within the authorizing provider's specialty.  See \"Patient Info\" tab in inbasket, or \"Choose Columns\" in Meds & Orders section of the refill encounter.              "

## 2019-11-21 RX ORDER — INSULIN ASPART 100 [IU]/ML
INJECTION, SOLUTION INTRAVENOUS; SUBCUTANEOUS
Qty: 15 ML | Refills: 1 | Status: SHIPPED | OUTPATIENT
Start: 2019-11-21 | End: 2020-06-15

## 2019-11-21 RX ORDER — METOPROLOL SUCCINATE 25 MG/1
TABLET, EXTENDED RELEASE ORAL
Qty: 90 TABLET | Refills: 2 | Status: SHIPPED | OUTPATIENT
Start: 2019-11-21 | End: 2020-01-17

## 2019-11-23 DIAGNOSIS — G47.01 INSOMNIA DUE TO MEDICAL CONDITION: ICD-10-CM

## 2019-11-25 RX ORDER — TRAZODONE HYDROCHLORIDE 50 MG/1
25-75 TABLET, FILM COATED ORAL
Qty: 135 TABLET | Refills: 0 | Status: SHIPPED | OUTPATIENT
Start: 2019-11-25 | End: 2020-02-17

## 2019-11-25 NOTE — TELEPHONE ENCOUNTER
"Found on current med list once clicked \"download all records\"  Prescription approved per [facility] Refill Protocol.  Lydia HALEY RN    Last Written Prescription Date:  Not on active med list  Last Fill Quantity: ,  # refills:   Last office visit: 10/22/2019 with prescribing provider:     Future Office Visit:   Next 5 appointments (look out 90 days)    Dec 03, 2019 10:00 AM CST  Office Visit with Ilene Tristan MD  [facility] ([facility] [address]  [facility] [address]  [phone]   Dec 03, 2019 10:30 AM CST  SHORT with Sabrina Meek [facility] ([facility]) [address]  [address]  [facility] [address]  [phone]   Feb 04, 2020  1:00 PM CST  Office Visit with Ilene Tristan MD  [facility] ([facility] [address]  [facility] [address]  [phone]          Requested Prescriptions   Pending Prescriptions Disp Refills     traZODone (DESYREL) 50 MG tablet [Pharmacy Med Name: TRAZODONE 50 MG TABLET] 90 tablet 1     Sig: TAKE 0.5-1.5 TABLETS (25-75 MG) BY MOUTH NIGHTLY AS NEEDED FOR SLEEP       Serotonin Modulators Passed - 11/23/2019 10:17 AM        Passed - Recent (12 mo) or future (30 days) visit within the authorizing provider's specialty     Patient has had an office visit with the authorizing provider or a provider within the authorizing providers department within the previous 12 mos or has a future within next 30 days. See \"Patient Info\" tab in inbasket, or \"Choose Columns\" in Meds & Orders section of the refill encounter.              Passed - Medication is active on med list        Passed - Patient is age 18 or older        Passed - No active pregnancy on record        Passed - No positive pregnancy test in past 12 months          "

## 2019-11-26 ENCOUNTER — PRE VISIT (OUTPATIENT)
Dept: UROLOGY | Facility: CLINIC | Age: 79
End: 2019-11-26

## 2019-11-26 NOTE — TELEPHONE ENCOUNTER
Reason for Visit: one month follow up symptom check    Diagnosis: urinary urgency at night    Orders/Procedures/Records: in system    Contact Patient: n/a    Rooming Requirements: normal      Shahla Vaughn LPN  11/26/19  7:32 AM

## 2019-11-27 DIAGNOSIS — E55.9 VITAMIN D DEFICIENCY: Primary | ICD-10-CM

## 2019-11-27 RX ORDER — ERGOCALCIFEROL 1.25 MG/1
50000 CAPSULE, LIQUID FILLED ORAL WEEKLY
OUTPATIENT
Start: 2019-11-27

## 2019-11-27 RX ORDER — CHOLECALCIFEROL (VITAMIN D3) 50 MCG
2000 TABLET ORAL DAILY
Qty: 90 TABLET | Refills: 3 | Status: SHIPPED | OUTPATIENT
Start: 2019-11-27 | End: 2020-12-04

## 2019-11-27 NOTE — TELEPHONE ENCOUNTER
VITAMIN D2 1.25MG(50,000 UNIT)    Last Written Prescription Date:  9/5/2019  Last Fill Quantity: 12,   # refills: 0  Last Office Visit : 8/16/2019  Future Office visit:  None    Routing refill request to provider for review/approval because:  Sending to Provider due to over the limit of refilling Vitamin D supplements.  Continue with the same dose med??  Change dose??  Check Vitamin -D Level??  Sending to provider for review and refills.      Mere Weinberg RN  Central Triage Red Flags/Med Refills

## 2019-11-27 NOTE — TELEPHONE ENCOUNTER
Zulma Lopez MD  You; Adult Rheum Triage- 14 minutes ago (3:40 PM)     Please refill as 2000 units a day, also needs follow up scheduled with me    Routing comment        Sent new order for Pt care Per Dr. Zulma Lopez MD 11/27/2019.   Order Information     Ordered Status Priority Ordering User Department   11/27/19 Sent (none) Mere Weinberg RN  ADULT RHEUMATOLOGY   Order History   Outpatient   Date/Time Action Taken User Additional Information   11/27/19 1556 Sign Mere Weinberg RN Ordering Mode: Fulfillment Order - No MD Sign   Outpatient Medication Detail      Disp Refills Start End EBENEZER   vitamin D3 (CHOLECALCIFEROL) 2000 units (50 mcg) tablet 90 tablet 3 11/27/2019  --   Sig - Route: Take 1 tablet (2,000 Units) by mouth daily - Oral   Sent to pharmacy as: vitamin D3 (CHOLECALCIFEROL) 2000 units (50 mcg) tablet   Class: E-Prescribe   Order: [id number]   E-Prescribing Status: Receipt confirmed by pharmacy (11/27/2019  3:56 PM CST)

## 2019-11-29 NOTE — TELEPHONE ENCOUNTER
Pt is scheduled to see Dr. Lopez on 1/15/19.  Left message requesting pt call back to discuss change in dosing of Vitamin D.    Kamille Ruffin RN  Rheumatology Clinic

## 2019-12-02 NOTE — TELEPHONE ENCOUNTER
Received call back, discussed changing from once a week to daily vitamin D.  Pt understands.    Has been having knee and hip pain for several days at a time, is taking tylenol, does get some relief from the tylenol.  Pain gets worse, then it gets better.  But she is wondering if there is something else she could take beside tylenol. She doesn't want anything but is just wondering if there is anything else.      Kamille Ruffin RN  Rheumatology Clinic

## 2019-12-03 ENCOUNTER — OFFICE VISIT (OUTPATIENT)
Dept: FAMILY MEDICINE | Facility: CLINIC | Age: 79
End: 2019-12-03
Payer: MEDICARE

## 2019-12-03 VITALS
WEIGHT: 201 LBS | TEMPERATURE: 97.6 F | DIASTOLIC BLOOD PRESSURE: 77 MMHG | SYSTOLIC BLOOD PRESSURE: 128 MMHG | HEIGHT: 66 IN | RESPIRATION RATE: 14 BRPM | HEART RATE: 52 BPM | BODY MASS INDEX: 32.3 KG/M2 | OXYGEN SATURATION: 97 %

## 2019-12-03 DIAGNOSIS — Z79.4 TYPE 2 DIABETES MELLITUS WITH HYPERGLYCEMIA, WITH LONG-TERM CURRENT USE OF INSULIN (H): ICD-10-CM

## 2019-12-03 DIAGNOSIS — I10 ESSENTIAL HYPERTENSION WITH GOAL BLOOD PRESSURE LESS THAN 140/90: Primary | ICD-10-CM

## 2019-12-03 DIAGNOSIS — N18.30 CKD (CHRONIC KIDNEY DISEASE) STAGE 3, GFR 30-59 ML/MIN (H): ICD-10-CM

## 2019-12-03 DIAGNOSIS — B00.1 RECURRENT COLD SORES: ICD-10-CM

## 2019-12-03 DIAGNOSIS — K21.9 GASTROESOPHAGEAL REFLUX DISEASE WITHOUT ESOPHAGITIS: ICD-10-CM

## 2019-12-03 DIAGNOSIS — R04.0 BLEEDING FROM THE NOSE: ICD-10-CM

## 2019-12-03 DIAGNOSIS — M35.02 SJOGREN'S SYNDROME WITH LUNG INVOLVEMENT (H): ICD-10-CM

## 2019-12-03 DIAGNOSIS — M25.50 ARTHRALGIA, UNSPECIFIED JOINT: ICD-10-CM

## 2019-12-03 DIAGNOSIS — J44.89 FOLLICULAR BRONCHIOLITIS (H): ICD-10-CM

## 2019-12-03 DIAGNOSIS — E11.65 TYPE 2 DIABETES MELLITUS WITH HYPERGLYCEMIA, WITH LONG-TERM CURRENT USE OF INSULIN (H): ICD-10-CM

## 2019-12-03 DIAGNOSIS — I50.32 CHRONIC DIASTOLIC CONGESTIVE HEART FAILURE (H): ICD-10-CM

## 2019-12-03 DIAGNOSIS — Z79.01 LONG TERM CURRENT USE OF ANTICOAGULANT THERAPY: ICD-10-CM

## 2019-12-03 LAB
ANION GAP SERPL CALCULATED.3IONS-SCNC: 7 MMOL/L (ref 3–14)
BUN SERPL-MCNC: 16 MG/DL (ref 7–30)
CALCIUM SERPL-MCNC: 9.7 MG/DL (ref 8.5–10.1)
CHLORIDE SERPL-SCNC: 106 MMOL/L (ref 94–109)
CO2 SERPL-SCNC: 26 MMOL/L (ref 20–32)
CREAT SERPL-MCNC: 0.97 MG/DL (ref 0.52–1.04)
GFR SERPL CREATININE-BSD FRML MDRD: 55 ML/MIN/{1.73_M2}
GLUCOSE SERPL-MCNC: 117 MG/DL (ref 70–99)
POTASSIUM SERPL-SCNC: 3.7 MMOL/L (ref 3.4–5.3)
SODIUM SERPL-SCNC: 139 MMOL/L (ref 133–144)

## 2019-12-03 PROCEDURE — 99214 OFFICE O/P EST MOD 30 MIN: CPT | Performed by: FAMILY MEDICINE

## 2019-12-03 PROCEDURE — 36415 COLL VENOUS BLD VENIPUNCTURE: CPT | Performed by: FAMILY MEDICINE

## 2019-12-03 PROCEDURE — 80048 BASIC METABOLIC PNL TOTAL CA: CPT | Performed by: FAMILY MEDICINE

## 2019-12-03 RX ORDER — POTASSIUM CHLORIDE 1500 MG/1
TABLET, EXTENDED RELEASE ORAL
Qty: 270 TABLET | Refills: 3 | Status: CANCELLED | OUTPATIENT
Start: 2019-12-03

## 2019-12-03 RX ORDER — SPIRONOLACTONE 25 MG/1
50 TABLET ORAL DAILY
Qty: 180 TABLET | Refills: 3 | Status: CANCELLED | OUTPATIENT
Start: 2019-12-03

## 2019-12-03 RX ORDER — ACYCLOVIR 400 MG/1
400 TABLET ORAL 3 TIMES DAILY
Qty: 15 TABLET | Refills: 2 | Status: ON HOLD | OUTPATIENT
Start: 2019-12-03 | End: 2020-03-04

## 2019-12-03 RX ORDER — ACYCLOVIR 50 MG/G
OINTMENT TOPICAL
Qty: 15 G | Refills: 3 | Status: ON HOLD | OUTPATIENT
Start: 2019-12-03 | End: 2022-09-26

## 2019-12-03 RX ORDER — AZITHROMYCIN 250 MG/1
250 TABLET, FILM COATED ORAL DAILY
Qty: 30 TABLET | Refills: 11 | Status: CANCELLED | OUTPATIENT
Start: 2019-12-03

## 2019-12-03 ASSESSMENT — MIFFLIN-ST. JEOR: SCORE: 1403.16

## 2019-12-03 ASSESSMENT — PAIN SCALES - GENERAL: PAINLEVEL: NO PAIN (0)

## 2019-12-03 NOTE — NURSING NOTE
"Chief Complaint   Patient presents with     Hypertension     Results     /77 (BP Location: Right arm, Patient Position: Sitting, Cuff Size: Adult Large)   Pulse 52   Temp 97.6  F (36.4  C) (Oral)   Resp 14   Ht 1.676 m (5' 5.98\")   Wt 91.2 kg (201 lb)   LMP  (Exact Date)   SpO2 97%   Breastfeeding No   BMI 32.46 kg/m   Estimated body mass index is 32.46 kg/m  as calculated from the following:    Height as of this encounter: 1.676 m (5' 5.98\").    Weight as of this encounter: 91.2 kg (201 lb).  bp completed using cuff size: large      Health Maintenance addressed:  NONE    N/a  Flores Blum CMA on 12/3/2019 at 10:08 AM                "

## 2019-12-03 NOTE — PROGRESS NOTES
Subjective   Betty Tee is a 79 year old female who presents to clinic today for the following health issues:    HPI   Hypertension Follow-up    Do you check your blood pressure regularly outside of the clinic? No     Are you following a low salt diet? No    Are your blood pressures ever more than 140 on the top number (systolic) OR more   than 90 on the bottom number (diastolic), for example 140/90? No    How many servings of fruits and vegetables do you eat daily?  2-3    On average, how many sweetened beverages do you drink each day (Examples: soda, juice, sweet tea, etc.  Do NOT count diet or artificially sweetened beverages)?   Coffee and water     How many days per week do you miss taking your medication? 0    At last visit, we stopped the metformin due to lower GFR/creatinine - ~5 wks ago.  Prior to that, she stopped the NSAIDS/ibuprofen, but GFR did not recover, went from 54 to 48 again.  Was as low as 43.    Glucose readings have been fine except at Thanksgiving.  Otherwise the numbers have been really good.    GI sx's update- was having mild upper GI sx's at last visit, but reports none now.  Had gone from PPI to pepcid (switch from zantac on 11/27/19), and last time, was having mild return of GI sx's, but better now, also possibly from the DM meds.    Jt pains- it's been L hip and R knee.  None for a couple weeks.  She's been taking tylenol in morning and evening.  Jt pains have been better since she started taking the tylenol consistently- the last couple weeks.    Rheum- Dr. Lopez wants her to go up to 2000 units (50mcg) daily, from weekly (was taking 1.25mg weekly).  Currently on prednisone alternating 6mg and 5mg every other day.  On 12/9/19, plan is to lower dose to 5mg daily.    She had a nose bleed the other day- hard to stop.  ~3 days ago, took ~45 minutes to stop it.  Packed, held pressure, leaned back.    Prior to the nose bleed, she was having zingers above her R ear, going on for few  wks prior, not painful, but noticeable.  None in past few days.  No other associated sx's.    HTN- on toprol XL 62.5mg/d, spironolactone 50mg/d.  BP looks better today- low last time, did not make med changes.    Sleep- traozodone, takes 1/2 - 1 tab nightly, working well.  Also not going on computer at night has been helping.        Patient Active Problem List   Diagnosis     Temporomandibular joint disorder     Diverticulitis of colon     Disorder of bone and cartilage     Osteoarthritis     Alcohol abuse, in remission     Restless legs syndrome (RLS)     Major depressive disorder, recurrent episode, moderate (H)     Essential hypertension with goal blood pressure less than 140/90     Advanced directives, counseling/discussion     Colouterine fistula     Permanent atrial fibrillation     Left ventricular hypertrophy     Health Care Home     Insomnia     Joint pains     Allergic reaction caused by a drug - likely plaquenil     Breast fibroadenoma     DVT (deep venous thrombosis) (H)     Fatty liver disease, nonalcoholic     Rib pain     Neck mass     Gastroesophageal reflux disease without esophagitis     Enlarged lymph node     Sjogren's syndrome with lung involvement (H)     ANGELICA (obstructive sleep apnea)     ILD (interstitial lung disease) (H)     Lower GI bleed     (HFpEF) heart failure with preserved ejection fraction (H)     Type 2 diabetes mellitus with hyperglycemia, with long-term current use of insulin (H)     Heart failure, chronic, with acute decompensation (H)     Hyperlipidemia LDL goal <100     Long term current use of anticoagulant therapy     Inflammatory arthritis     Follicular bronchiolitis (H)     CKD (chronic kidney disease) stage 3, GFR 30-59 ml/min (H)     Closed head injury     Urge incontinence of urine     Urinary urgency     Past Surgical History:   Procedure Laterality Date     BACK SURGERY  1962     BIOPSY BREAST  9/27/02    Biopsy Left Breast     C APPENDECTOMY  1970's?     C  NONSPECIFIC PROCEDURE      exploratory abd lap, adhesions, resolved RLQ pain, diverticulitis episodes     CARDIAC SURGERY       CHOLECYSTECTOMY  ?     COLECTOMY LEFT  2011    Procedure:COLECTOMY LEFT; Laparoscopic mobilization of splenic flexture, sigmoid colectomy, coloprotoscopy, loop illeostomy; Surgeon:CK CASTANEDA; Location:[facility]     HYSTERECTOMY TOTAL ABDOMINAL, BILATERAL SALPINGO-OOPHORECTOMY, COMBINED  2011    Procedure:COMBINED HYSTERECTOMY TOTAL ABDOMINAL, BILATERAL SALPINGO-OOPHORECTOMY; total abdominal hysterectomy, bilateral salpingo-oophorectomy; Surgeon:ALETA MANUEL; Location:[facility]     INSERT STENT URETER  2011    Procedure:INSERT STENT URETER; Placement of Bilateral Ureteral Stents ; Surgeon:PRANEETH BRYANT; Location:[facility]     SIGMOIDOSCOPY FLEXIBLE  11/3/2011    Procedure:SIGMOIDOSCOPY FLEXIBLE; Flexible Sigmoidoscopy; Surgeon:CK CASTANEDA; Location:[facility]     TAKEDOWN ILEOSTOMY  2012    Procedure:TAKEDOWN ILEOSTOMY; Takedown Loop Ileostomy ; Surgeon:CK CASTANEDA; Location:[facility]       Social History     Tobacco Use     Smoking status: Former Smoker     Packs/day: 0.50     Years: 10.00     Pack years: 5.00     Types: Cigarettes     Last attempt to quit: 2011     Years since quittin.4     Smokeless tobacco: Never Used     Tobacco comment:  ppd   Substance Use Topics     Alcohol use: No     Alcohol/week: 0.0 standard drinks     Comment: In recovery beginning      Family History   Problem Relation Age of Onset     C.A.D. Mother 63        MI- first at age 63     Heart Disease Mother      Hypertension Mother      Cerebrovascular Disease Mother      Hyperlipidemia Mother      Alcohol/Drug Father      Alzheimer Disease Father      Dementia Father      Hypertension Father      Hyperlipidemia Father      Diabetes Sister      C.A.D. Sister 52        Minor MI- age 50's     Heart Disease Sister      Hypertension Sister      Hypertension Sister       Hypertension Brother      Cancer - colorectal Sister 48        Late 40's early 50's     Prostate Cancer Brother 74        Dx'd age 74     Gastrointestinal Disease Sister         Diverticulitis     Gastrointestinal Disease Brother         Diverticulitis     Lipids Sister      Lipids Sister      Parkinsonism Brother      Diabetes Sister      Heart Disease Sister         CHF     Cancer Sister         lung, smoker     Substance Abuse Sister      Substance Abuse Brother      Asthma Sister      Cancer Sister      Breast Cancer Daughter      Prostate Cancer Brother      Hyperlipidemia Brother      Diabetes Other      Hypertension Other          Current Outpatient Medications   Medication Sig Dispense Refill     acetaminophen (TYLENOL) 500 MG tablet Take 500 mg by mouth nightly as needed        acyclovir (ZOVIRAX) 400 MG tablet Take 1 tablet (400 mg) by mouth 3 times daily For a couple days 15 tablet 2     acyclovir (ZOVIRAX) 5 % external ointment Apply topically 6 times daily As needed for outbreaks 15 g 3     albuterol (PROAIR HFA, PROVENTIL HFA, VENTOLIN HFA) 108 (90 BASE) MCG/ACT inhaler Inhale 2 puffs into the lungs every 6 hours 1 Inhaler 3     atorvastatin (LIPITOR) 10 MG tablet TAKE 1 TABLET BY MOUTH EVERY DAY 90 tablet 1     azithromycin (ZITHROMAX) 250 MG tablet Take 1 tablet (250 mg) by mouth daily 30 tablet 11     BD JANE U/F 32G X 4 MM insulin pen needle USE 4 DAILY AS DIRECTED. 400 each 1     blood glucose (ACCU-CHEK SHANNON PLUS) test strip USE TO TEST BLOOD SUGARS 3 TIMES DAILY 400 strip 3     blood glucose monitoring (ACCU-CHEK FASTCLIX) lancets Use to test blood sugar 4 times daily or as directed.  Ok to substitute alternative if insurance prefers. 1 Box 11     blood glucose monitoring (ACCU-CHEK MULTICLIX) lancets Use to test blood sugar 4 times daily or as directed. 4 Box 3     cevimeline (EVOXAC) 30 MG capsule Take 1 capsule (30 mg) by mouth 3 times daily 270 capsule 0     COMPRESSION STOCKINGS Wear  compression stockings in affected leg (right leg) or both legs most of the time during the day and take them off at night. 2 each 2     fluticasone (FLONASE) 50 MCG/ACT nasal spray Spray 1-2 sprays into both nostrils daily as needed for allergies 18.2 mL 11     fluticasone-vilanterol (BREO ELLIPTA) 100-25 MCG/INH inhaler Inhale 1 puff into the lungs daily 1 Inhaler 11     Humidifier MISC Continuous humidified air via concentrator.   Diagnosis: ILD  Duration: Lifetime 99. 1 each 1     hydroxychloroquine (PLAQUENIL) 200 MG tablet Take 2 tablets (400 mg) by mouth daily Annual Plaquenil toxicity eye screening required. 180 tablet 1     insulin glargine (BASAGLAR KWIKPEN) 100 UNIT/ML pen INJECT 25 UNITS SUBCUTANEOUS DAILY (TO REPLACE LANTUS) 15 mL 1     ketoconazole (NIZORAL) 2 % external cream Apply topically 2 times daily 60 g 1     lidocaine (LIDODERM) 5 % patch Apply patch to painful area for up to 12 h within a 24 h period.  Remove after 12 hours. 30 patch 5     metoprolol succinate (TOPROL-XL) 100 MG 24 hr tablet Take 50 mg by mouth in combination with 12.5 for a total of 62.5 twice a day 90 tablet 3     metoprolol succinate ER (TOPROL-XL) 25 MG 24 hr tablet Take 0.5 tablets (12.5 mg) by mouth 2 times daily Take 50 mg by mouth in combination with 12.5 for a total of 62.5 twice a day 90 tablet 2     montelukast (SINGULAIR) 10 MG tablet TAKE 1 TABLET BY MOUTH EVERYDAY AT BEDTIME 90 tablet 0     NOVOLOG FLEXPEN 100 UNIT/ML soln INJECT 12 UNITS SUBCUTANEOUS 3 TIMES DAILY (WITH MEALS) 15 mL 1     order for DME Please provide patient with a POC.  Okay to test patient on POC to maintain oxygen saturations above 90%.   Patient currently uses 2 to 3 LPM oxygen with activity. 1 Device 0     order for DME Equipment being ordered: Oxygen  Pulsed oxygen portable tank  Rate: 4LNC  Diagnosis: ILD  Duration: Lifetime -99 1 Device 1     order for DME Equipment being ordered: Full face mask for CPAP - size: F10 large 1 each 0      order for DME Oxygen: Patient requires supplemental Oxygen 4 LPM via nasal canula with activity. Please provide patient with a home unit and with portability capability. Oxygen will be for a lifetime. 1 Device 0     potassium chloride ER (KLOR-CON) 20 MEQ CR tablet Take 2 tablets (40 mEq) by mouth every morning AND 1 tablet (20 mEq) every evening. 270 tablet 3     predniSONE (DELTASONE) 1 MG tablet 6/7 mg every other day x one month, 6 mg a day x one month then 5 mg a day and stay on 5 mg a day 90 tablet 1     predniSONE (DELTASONE) 5 MG tablet 6/7 mg every other day x one month, 6 mg a day x one month then 5 mg a day and stay on 5 mg a day 90 tablet 1     rivaroxaban ANTICOAGULANT (XARELTO) 20 MG TABS tablet Take 1 tablet (20 mg) by mouth daily (with dinner) 90 tablet 3     Semaglutide,0.25 or 0.5MG/DOS, (OZEMPIC, 0.25 OR 0.5 MG/DOSE,) 2 MG/1.5ML SOPN Inject 0.5 mg Subcutaneous once a week 4.5 mL 1     spironolactone (ALDACTONE) 25 MG tablet Take 2 tablets (50 mg) by mouth daily 180 tablet 3     tiZANidine (ZANAFLEX) 2 MG tablet Take 1-2 tablets (2-4 mg) by mouth 3 times daily 90 tablet 1     torsemide (DEMADEX) 10 MG tablet Take one tab (10 mg) with one 20 mg tab to = 30 mg daily in afternoon. 90 tablet 3     torsemide (DEMADEX) 20 MG tablet Take 2 tabs (40 mg) in am daily 105 tablet 10     traZODone (DESYREL) 50 MG tablet TAKE 0.5-1.5 TABLETS (25-75 MG) BY MOUTH NIGHTLY AS NEEDED FOR SLEEP 135 tablet 0     vitamin D3 (CHOLECALCIFEROL) 2000 units (50 mcg) tablet Take 1 tablet (2,000 Units) by mouth daily 90 tablet 3     escitalopram (LEXAPRO) 20 MG tablet TAKE 1 TABLET BY MOUTH EVERY DAY 90 tablet 1     loratadine (CLARITIN) 10 MG tablet TAKE 1 TABLET BY MOUTH EVERY DAY 90 tablet 1     Allergies   Allergen Reactions     Amoxicillin-Pot Clavulanate      Augmentin Nausea and Vomiting     Codeine Nausea and Vomiting     Codeine      PN: LW Reaction: HIVES     Penicillins Nausea and Vomiting     PN: LW Reaction: GI  "Upset     Phenobarbital Itching     Phenobarbital      Seasonal Allergies      Recent Labs   Lab Test 12/03/19  1049 10/22/19  1416  08/16/19  1334  04/09/19  1017  10/02/18  1524 05/23/18  1028  02/21/18  1230  10/06/17  1421   A1C  --  6.3*  --   --   --  7.2*  --  6.7* 6.9*  --   --    < > 7.3*   LDL  --   --   --   --   --  29  --   --  21  --  5  --   --    HDL  --   --   --   --   --  59  --   --  53  --  64  --   --    TRIG  --   --   --   --   --  148  --   --  113  --  188*  --   --    ALT  --   --   --  16  --   --   --   --  21  --   --   --  43   CR 0.97 1.10*   < > 1.05*   < > 0.85   < >  --  0.90   < > 0.88   < > 0.94   GFRESTIMATED 55* 48*   < > 50*   < > 65   < >  --  61   < > 62   < > 58*   GFRESTBLACK 64 55*   < > 58*   < > 76   < >  --  74   < > 76   < > 70   POTASSIUM 3.7 4.1   < >  --    < > 3.9   < >  --  3.5   < > 3.9   < > 4.3   TSH  --   --   --   --   --  2.72  --   --  2.42  --   --    < >  --     < > = values in this interval not displayed.      BP Readings from Last 3 Encounters:   12/04/19 (!) 150/64   12/03/19 128/77   11/14/19 122/77    Wt Readings from Last 3 Encounters:   12/04/19 91.2 kg (201 lb)   12/03/19 91.2 kg (201 lb)   11/14/19 92.1 kg (203 lb)            Reviewed and updated as needed this visit by Provider  Tobacco  Allergies  Meds  Problems  Med Hx  Surg Hx  Fam Hx         Review of Systems   ROS COMP: Constitutional, HEENT, cardiovascular, pulmonary, GI, , musculoskeletal, neuro, skin, endocrine and psych systems are negative, except as otherwise noted.      Objective    /77 (BP Location: Right arm, Patient Position: Sitting, Cuff Size: Adult Large)   Pulse 52   Temp 97.6  F (36.4  C) (Oral)   Resp 14   Ht 1.676 m (5' 5.98\")   Wt 91.2 kg (201 lb)   LMP  (Exact Date)   SpO2 97%   Breastfeeding No   BMI 32.46 kg/m    Body mass index is 32.46 kg/m .  Physical Exam   GENERAL APPEARANCE: healthy, alert and no distress     EYES: PERRL, sclera clear     " HENT: nose and mouth without ulcers or lesions     NECK: no adenopathy, no asymmetry, masses, or scars and thyroid normal to palpation     RESP: lungs clear to auscultation - no rales, rhonchi or wheezes     CV: regular rates and rhythm, normal S1 S2, no S3 or S4 and no murmur, click or rub      Abdomen: soft, nontender, no HSM or masses and bowel sounds normal     Ext: warm, dry, no edema      Psych: full range affect, normal speech and grooming, judgement and insight intact     Diagnostic Test Results:  Labs reviewed in Epic  Results for orders placed or performed in visit on 12/03/19   Basic metabolic panel  (Ca, Cl, CO2, Creat, Gluc, K, Na, BUN)     Status: Abnormal   Result Value Ref Range    Sodium 139 133 - 144 mmol/L    Potassium 3.7 3.4 - 5.3 mmol/L    Chloride 106 94 - 109 mmol/L    Carbon Dioxide 26 20 - 32 mmol/L    Anion Gap 7 3 - 14 mmol/L    Glucose 117 (H) 70 - 99 mg/dL    Urea Nitrogen 16 7 - 30 mg/dL    Creatinine 0.97 0.52 - 1.04 mg/dL    GFR Estimate 55 (L) >60 mL/min/[1.73_m2]    GFR Estimate If Black 64 >60 mL/min/[1.73_m2]    Calcium 9.7 8.5 - 10.1 mg/dL           Assessment & Plan       ICD-10-CM    1. Essential hypertension with goal blood pressure less than 140/90 I10 Basic metabolic panel  (Ca, Cl, CO2, Creat, Gluc, K, Na, BUN)   2. CKD (chronic kidney disease) stage 3, GFR 30-59 ml/min (H) N18.3 Basic metabolic panel  (Ca, Cl, CO2, Creat, Gluc, K, Na, BUN)   3. Type 2 diabetes mellitus with hyperglycemia, with long-term current use of insulin (H) E11.65     Z79.4    4. Gastroesophageal reflux disease without esophagitis K21.9    5. Follicular bronchiolitis (H) J42    6. Chronic diastolic congestive heart failure (H) I50.32    7. Long term current use of anticoagulant therapy Z79.01    8. (HFpEF) heart failure with preserved ejection fraction (H) I50.30    9. Bleeding from the nose R04.0    10. Recurrent cold sores B00.1 acyclovir (ZOVIRAX) 400 MG tablet     acyclovir (ZOVIRAX) 5 %  "external ointment   11. Arthralgia, unspecified joint M25.50    12. Sjogren's syndrome with lung involvement (H) M35.02        HTN/CKD/low GFR- Will recheck BMP today- off NSAIDS for awhile, and now off metformin for 1-2 months.  Will see if GFR improved.      DM- glucose readings doing well even after stopping metformin.  Cont other medications and f/u in 1/20 for more Dm specific visit.    GI/GERD sx's resolved per pt- now on pepcid, off PPIs for long-term concerns.    Pulmonary- f/u with pulmonary.    Sjogrens/jt pains- doing better with BID tylenol, prn lidocaine patches.  Continues to wean down on prednisone.  Cont f/uw with Dr. Lopez.    Cardiology/nose bleed- nose bleed ~3 days ago, took 45 min to stop.  On xarelto, first time bleed on it.  Will cont, rec humidifier in bedroom.  Discuss with cards if happens again.  Cont f/u with cardiology as directed.    COld sores- acyclovir refills sent.    Return in about 6 weeks (around 1/14/2020) for Diabetes, Blood Pressure Recheck, Routine Visit/Chronic Cares/Med Check.       BMI:   Estimated body mass index is 32.46 kg/m  as calculated from the following:    Height as of this encounter: 1.676 m (5' 5.98\").    Weight as of this encounter: 91.2 kg (201 lb).   Weight management plan: Discussed healthy diet and exercise guidelines        Return in about 6 weeks (around 1/14/2020) for Diabetes, Blood Pressure Recheck, Routine Visit/Chronic Cares/Med Check.    Ilene Tristan MD  [facility]      "

## 2019-12-04 ENCOUNTER — OFFICE VISIT (OUTPATIENT)
Dept: UROLOGY | Facility: CLINIC | Age: 79
End: 2019-12-04
Payer: MEDICARE

## 2019-12-04 VITALS
HEIGHT: 66 IN | DIASTOLIC BLOOD PRESSURE: 64 MMHG | WEIGHT: 201 LBS | HEART RATE: 52 BPM | BODY MASS INDEX: 32.3 KG/M2 | SYSTOLIC BLOOD PRESSURE: 150 MMHG

## 2019-12-04 DIAGNOSIS — R35.1 NOCTURIA: Primary | ICD-10-CM

## 2019-12-04 ASSESSMENT — MIFFLIN-ST. JEOR: SCORE: 1403.16

## 2019-12-04 ASSESSMENT — PAIN SCALES - GENERAL: PAINLEVEL: NO PAIN (0)

## 2019-12-04 NOTE — PROGRESS NOTES
Urology Clinic Note      Date: 12/4/2019  Time: 11:22 AM  Patient: Betty Tee  MRN: [id number]    Reason for Visit: Urinary Urgency    HPI/Subjective:   Betty Tee is a 79 year old female with a history of urinary urgency, especially at night, associated with incontinence. She was last seen in clinic in 2 months ago. At this time, conservative measures were recommended, including controlling LE edema, keeping a voiding diary, limiting salt intake, and switching her diuretic schedule to take her larger dose in the morning and the small dose in the evening.     Today she feels excellent. The conservative measures have helped tremendously and she is no longer experiencing nighttime urgency or incontinence. She is very pleased with her results.     Objective:  There were no vitals taken for this visit.  Gen: In NAD, conversant, pleasant.  Resp: Breathing non-labored on room air.  CV: Warm and well perfused  Abd: Soft, non-distended, non-tender  Ext: Moving all 4, no BLE edema noted  Neuro: No focal deficits noted    Assessment & Plan: Betty Tee is a 79 year old female, referred to urology for nighttime urinary urgency and incontinence. She has had tremendous success with conservative measures- (readjusting Lasix schedule, limiting salt intake, etc.). She has no complaints today.    - Continue current conservative measures.   - Follow up as needed    Suzanne Chinchilla MD  PGY-2 Urology  Pager [phone]    Thank you for allowing me to participate in the care of  Ms. Betty Tee and I will be more than happy to see her again in the future should the need arise.    Patient was seen, evaluated and plan was formulated in conjunction with me and I agree with the above.  Vale Nassar MD

## 2019-12-04 NOTE — LETTER
12/4/2019       RE: Betty Tee  [address]  [facility] [address]     Dear Colleague,    Thank you for referring your patient, Betty Tee, to the [facility] UROLOGY AND [facility] PR[facility] UROLOG[facility]CERS at [facility]. Please see a copy of my visit note below.    Urology Clinic Note      Date: 12/4/2019  Time: 11:22 AM  Patient: Betty Tee  MRN: [id number]    Reason for Visit: Urinary Urgency    HPI/Subjective:   Betty Tee is a 79 year old female with a history of urinary urgency, especially at night, associated with incontinence. She was last seen in clinic in 2 months ago. At this time, conservative measures were recommended, including controlling LE edema, keeping a voiding diary, limiting salt intake, and switching her diuretic schedule to take her larger dose in the morning and the small dose in the evening.     Today she feels excellent. The conservative measures have helped tremendously and she is no longer experiencing nighttime urgency or incontinence. She is very pleased with her results.     Objective:  There were no vitals taken for this visit.  Gen: In NAD, conversant, pleasant.  Resp: Breathing non-labored on room air.  CV: Warm and well perfused  Abd: Soft, non-distended, non-tender  Ext: Moving all 4, no BLE edema noted  Neuro: No focal deficits noted    Assessment & Plan: Betty Tee is a 79 year old female, referred to urology for nighttime urinary urgency and incontinence. She has had tremendous success with conservative measures- (readjusting Lasix schedule, limiting salt intake, etc.). She has no complaints today.    - Continue current conservative measures.   - Follow up as needed    Suzanne Chinchilla MD  PGY-2 Urology  Pager [phone]    Thank you for allowing me to participate in the care of  Ms. Betty Tee and I will be more than happy to see her again in the future should the need arise.    Patient was seen,  evaluated and plan was formulated in conjunction with me and I agree with the above.  Vale Nassar MD

## 2019-12-04 NOTE — NURSING NOTE
"Chief Complaint   Patient presents with     Follow Up     one month follow up symptom check       Blood pressure (!) 150/64, pulse 52, height 1.676 m (5' 5.98\"), weight 91.2 kg (201 lb), not currently breastfeeding. Body mass index is 32.46 kg/m .    Patient Active Problem List   Diagnosis     Temporomandibular joint disorder     Diverticulitis of colon     Disorder of bone and cartilage     Osteoarthritis     Alcohol abuse, in remission     Restless legs syndrome (RLS)     Major depressive disorder, recurrent episode, moderate (H)     Essential hypertension with goal blood pressure less than 140/90     Advanced directives, counseling/discussion     Colouterine fistula     Permanent atrial fibrillation     Left ventricular hypertrophy     Health Care Home     Insomnia     Joint pains     Allergic reaction caused by a drug - likely plaquenil     Breast fibroadenoma     DVT (deep venous thrombosis) (H)     Fatty liver disease, nonalcoholic     Rib pain     Neck mass     Gastroesophageal reflux disease without esophagitis     Enlarged lymph node     Sjogren's syndrome with lung involvement (H)     ANGELICA (obstructive sleep apnea)     ILD (interstitial lung disease) (H)     Lower GI bleed     (HFpEF) heart failure with preserved ejection fraction (H)     Type 2 diabetes mellitus with hyperglycemia, with long-term current use of insulin (H)     Heart failure, chronic, with acute decompensation (H)     Hyperlipidemia LDL goal <100     Long term current use of anticoagulant therapy     Inflammatory arthritis     Follicular bronchiolitis (H)     CKD (chronic kidney disease) stage 3, GFR 30-59 ml/min (H)     Closed head injury     Urge incontinence of urine     Urinary urgency       Allergies   Allergen Reactions     Amoxicillin-Pot Clavulanate      Augmentin Nausea and Vomiting     Codeine Nausea and Vomiting     Codeine      PN: LW Reaction: HIVES     Penicillins Nausea and Vomiting     PN: LW Reaction: GI Upset     " Phenobarbital Itching     Phenobarbital      Seasonal Allergies        Current Outpatient Medications   Medication Sig Dispense Refill     acetaminophen (TYLENOL) 500 MG tablet Take 500 mg by mouth nightly as needed        acyclovir (ZOVIRAX) 400 MG tablet Take 1 tablet (400 mg) by mouth 3 times daily For a couple days 15 tablet 2     acyclovir (ZOVIRAX) 5 % external ointment Apply topically 6 times daily As needed for outbreaks 15 g 3     albuterol (PROAIR HFA, PROVENTIL HFA, VENTOLIN HFA) 108 (90 BASE) MCG/ACT inhaler Inhale 2 puffs into the lungs every 6 hours 1 Inhaler 3     atorvastatin (LIPITOR) 10 MG tablet TAKE 1 TABLET BY MOUTH EVERY DAY 90 tablet 1     azithromycin (ZITHROMAX) 250 MG tablet Take 1 tablet (250 mg) by mouth daily 30 tablet 11     BD JANE U/F 32G X 4 MM insulin pen needle USE 4 DAILY AS DIRECTED. 400 each 1     blood glucose (ACCU-CHEK SHANNON PLUS) test strip USE TO TEST BLOOD SUGARS 3 TIMES DAILY 400 strip 3     blood glucose monitoring (ACCU-CHEK FASTCLIX) lancets Use to test blood sugar 4 times daily or as directed.  Ok to substitute alternative if insurance prefers. 1 Box 11     blood glucose monitoring (ACCU-CHEK MULTICLIX) lancets Use to test blood sugar 4 times daily or as directed. 4 Box 3     cevimeline (EVOXAC) 30 MG capsule Take 1 capsule (30 mg) by mouth 3 times daily 270 capsule 0     COMPRESSION STOCKINGS Wear compression stockings in affected leg (right leg) or both legs most of the time during the day and take them off at night. 2 each 2     escitalopram (LEXAPRO) 20 MG tablet TAKE 1 TABLET BY MOUTH EVERY DAY 90 tablet 0     fluticasone (FLONASE) 50 MCG/ACT nasal spray Spray 1-2 sprays into both nostrils daily as needed for allergies 18.2 mL 11     fluticasone-vilanterol (BREO ELLIPTA) 100-25 MCG/INH inhaler Inhale 1 puff into the lungs daily 1 Inhaler 11     Humidifier MISC Continuous humidified air via concentrator.   Diagnosis: ILD  Duration: Lifetime 99. 1 each 1      hydroxychloroquine (PLAQUENIL) 200 MG tablet Take 2 tablets (400 mg) by mouth daily Annual Plaquenil toxicity eye screening required. 180 tablet 1     insulin glargine (BASAGLAR KWIKPEN) 100 UNIT/ML pen INJECT 25 UNITS SUBCUTANEOUS DAILY (TO REPLACE LANTUS) 15 mL 1     ketoconazole (NIZORAL) 2 % external cream Apply topically 2 times daily 60 g 1     lidocaine (LIDODERM) 5 % patch Apply patch to painful area for up to 12 h within a 24 h period.  Remove after 12 hours. 30 patch 5     loratadine (CLARITIN) 10 MG tablet TAKE 1 TABLET BY MOUTH EVERY DAY 90 tablet 1     metoprolol succinate (TOPROL-XL) 100 MG 24 hr tablet Take 50 mg by mouth in combination with 12.5 for a total of 62.5 twice a day 90 tablet 3     metoprolol succinate ER (TOPROL-XL) 25 MG 24 hr tablet Take 0.5 tablets (12.5 mg) by mouth 2 times daily Take 50 mg by mouth in combination with 12.5 for a total of 62.5 twice a day 90 tablet 2     montelukast (SINGULAIR) 10 MG tablet TAKE 1 TABLET BY MOUTH EVERYDAY AT BEDTIME 90 tablet 0     NOVOLOG FLEXPEN 100 UNIT/ML soln INJECT 12 UNITS SUBCUTANEOUS 3 TIMES DAILY (WITH MEALS) 15 mL 1     order for DME Please provide patient with a POC.  Okay to test patient on POC to maintain oxygen saturations above 90%.   Patient currently uses 2 to 3 LPM oxygen with activity. 1 Device 0     order for DME Equipment being ordered: Oxygen  Pulsed oxygen portable tank  Rate: 4LNC  Diagnosis: ILD  Duration: Lifetime -99 1 Device 1     order for DME Equipment being ordered: Full face mask for CPAP - size: F10 large 1 each 0     order for DME Oxygen: Patient requires supplemental Oxygen 4 LPM via nasal canula with activity. Please provide patient with a home unit and with portability capability. Oxygen will be for a lifetime. 1 Device 0     potassium chloride ER (KLOR-CON) 20 MEQ CR tablet Take 2 tablets (40 mEq) by mouth every morning AND 1 tablet (20 mEq) every evening. 270 tablet 3     predniSONE (DELTASONE) 1 MG tablet 6/7  mg every other day x one month, 6 mg a day x one month then 5 mg a day and stay on 5 mg a day 90 tablet 1     predniSONE (DELTASONE) 5 MG tablet 6/7 mg every other day x one month, 6 mg a day x one month then 5 mg a day and stay on 5 mg a day 90 tablet 1     rivaroxaban ANTICOAGULANT (XARELTO) 20 MG TABS tablet Take 1 tablet (20 mg) by mouth daily (with dinner) 90 tablet 3     Semaglutide,0.25 or 0.5MG/DOS, (OZEMPIC, 0.25 OR 0.5 MG/DOSE,) 2 MG/1.5ML SOPN Inject 0.5 mg Subcutaneous once a week 4.5 mL 1     spironolactone (ALDACTONE) 25 MG tablet Take 2 tablets (50 mg) by mouth daily 180 tablet 3     tiZANidine (ZANAFLEX) 2 MG tablet Take 1-2 tablets (2-4 mg) by mouth 3 times daily 90 tablet 1     torsemide (DEMADEX) 10 MG tablet Take one tab (10 mg) with one 20 mg tab to = 30 mg daily in afternoon. 90 tablet 3     torsemide (DEMADEX) 20 MG tablet Take 2 tabs (40 mg) in am daily 105 tablet 10     traZODone (DESYREL) 50 MG tablet TAKE 0.5-1.5 TABLETS (25-75 MG) BY MOUTH NIGHTLY AS NEEDED FOR SLEEP 135 tablet 0     vitamin D3 (CHOLECALCIFEROL) 2000 units (50 mcg) tablet Take 1 tablet (2,000 Units) by mouth daily 90 tablet 3       Social History     Tobacco Use     Smoking status: Former Smoker     Packs/day: 0.50     Years: 10.00     Pack years: 5.00     Types: Cigarettes     Last attempt to quit: 2011     Years since quittin.3     Smokeless tobacco: Never Used     Tobacco comment:  ppd   Substance Use Topics     Alcohol use: No     Alcohol/week: 0.0 standard drinks     Comment: In recovery beginning      Drug use: No       Shahla Vaughn LPN  2019  11:23 AM

## 2019-12-05 DIAGNOSIS — E11.9 TYPE 2 DIABETES MELLITUS WITHOUT COMPLICATION, WITHOUT LONG-TERM CURRENT USE OF INSULIN (H): ICD-10-CM

## 2019-12-06 NOTE — TELEPHONE ENCOUNTER
Sabrina,  Please address due to CW's absence.  Refill request for Metformin.  Not on current medication list  Thanks,  Sophia Hemphill RN    Stopped  Note from 10/22/19 OV with CW:  DM II- in good control now with A1C lower at 6.3 (from 7.2 in 4/19)- likely due to the addition of ozempic 0.5mg once weekly for the past couple months.  She is still also on metformin, basaglar, and novolog with meals.  Reviewed MTM notes from today after this visit- pt will try stopping metformin for a week and see if GI sx's improve    Note from 10/22/19 OV with MTM:  Diabetes: Improved. Fasting blood glucose at goal of  mg/dL per her report. Will get A1c drawn today. Pt may benefit from a trial period of stopping metformin to see if this improves stomach symptoms. Potassium supplement may also be to blame for this. This could be ozempic as well, but will start with this metformin first as it has a higher incidence of GI sx.   Explained that blood sugars may go up during this but that is okay.

## 2019-12-06 NOTE — TELEPHONE ENCOUNTER
Requested Prescriptions   Pending Prescriptions Disp Refills     metFORMIN (GLUCOPHAGE-XR) 500 MG 24 hr tablet [Pharmacy Med Name: METFORMIN HCL  MG TABLET] 180 tablet 0     Sig: TAKE 2 TABLETS BY MOUTH DAILY WITH DINNER  Last Written Prescription Date:  12/5/19  Last Fill Quantity: 180 tab,  # refills: 0   Last office visit: 12/3/2019 with prescribing provider:  Kun   Future Office Visit:   Next 5 appointments (look out 90 days)    Jan 21, 2020 10:00 AM CST  Office Visit with Ilene Tristan MD  [facility] ([facility]) [address] [place]  [facility] [address]  [phone]   Jan 21, 2020 10:30 AM CST  SHORT with Sabrina Meek [facility] ([facility]) [address]  [address]  [facility] [address]  [phone]   Feb 04, 2020  1:00 PM CST  Office Visit with Ilene Tristan MD  [facility] ([facility]) [address] [place]  [facility] [address]  [phone]             Biguanide Agents Failed - 12/5/2019  3:19 AM        Failed - Blood pressure less than 140/90 in past 6 months     BP Readings from Last 3 Encounters:   12/04/19 (!) 150/64   12/03/19 128/77   11/14/19 122/77                 Failed - Medication is active on med list        Passed - Patient has documented LDL within the past 12 mos.     Recent Labs   Lab Test 04/09/19  1017   LDL 29             Passed - Patient has had a Microalbumin in the past 15 mos.     Recent Labs   Lab Test 10/22/19  1414   MICROL 9   UMALCR 25.48*             Passed - Patient is age 10 or older        Passed - Patient has documented A1c within the specified period of time.     If HgbA1C is 8 or greater, it needs to be on file within the past 3 months.  If less than 8, must be on file within the past 6 months.     Recent Labs   Lab Test 10/22/19  1416   A1C 6.3*             Passed - Patient's CR is NOT>1.4 OR Patient's EGFR is NOT<45  "within past 12 mos.     Recent Labs   Lab Test 12/03/19  1049   GFRESTIMATED 55*   GFRESTBLACK 64       Recent Labs   Lab Test 12/03/19  1049   CR 0.97             Passed - Patient does NOT have a diagnosis of CHF.        Passed - Patient is not pregnant        Passed - Patient has not had a positive pregnancy test within the past 12 mos.         Passed - Recent (6 mo) or future (30 days) visit within the authorizing provider's specialty     Patient had office visit in the last 6 months or has a visit in the next 30 days with authorizing provider or within the authorizing provider's specialty.  See \"Patient Info\" tab in inbasket, or \"Choose Columns\" in Meds & Orders section of the refill encounter.               "

## 2019-12-11 RX ORDER — METFORMIN HCL 500 MG
TABLET, EXTENDED RELEASE 24 HR ORAL
Start: 2019-12-11

## 2019-12-11 NOTE — TELEPHONE ENCOUNTER
We stopped Metformin at the 10/22 office visit. CW saw patient on 12/3 and confirmed that it was stopped.     Sabrina Meek PharmD, JAMES, BCACP  MTM Pharmacist, [facility]

## 2019-12-12 NOTE — RESULT ENCOUNTER NOTE
Here are your lab results from your recent visit...  -Your basic metabolic panel (which includes electrolyte levels, blood sugar level and kidney function tests) looks a bit better with the GFR (flow rate through the kidneys) improving from 48 to 55.    Please let me know if you have any questions.  Best,   Lev Tristan MD

## 2019-12-18 DIAGNOSIS — J30.89 SEASONAL ALLERGIC RHINITIS DUE TO OTHER ALLERGIC TRIGGER: ICD-10-CM

## 2019-12-18 DIAGNOSIS — F33.1 MAJOR DEPRESSIVE DISORDER, RECURRENT EPISODE, MODERATE (H): ICD-10-CM

## 2019-12-19 NOTE — TELEPHONE ENCOUNTER
Routing refill request to provider for review/approval because:  Drug not on the G refill protocol for age - claritin  Lexapro- recent OV notes not complete.  Please authorize if appropriate.  Thanks,  Radha Otero RN    claritin      Last Written Prescription Date:  8/20/19  Last Fill Quantity: 90,   # refills: 1  Last Office Visit: 12/3/19    lexaprop      Last Written Prescription Date:  9/23/19  Last Fill Quantity: 90,   # refills: 0  Last Office Visit: 12/3/19  Future Office visit:      Future Office visit:    Next 5 appointments (look out 90 days)    Jan 21, 2020 10:00 AM CST  Office Visit with Ilene Tristan MD  [facility] ([facility]) [address] [place]  [facility] [address]  [phone]   Jan 21, 2020 10:30 AM CST  SHORT with Sabrina Meek [facility] ([facility]) [address]  [address]  [facility] [address]  [phone]   Feb 04, 2020  1:00 PM CST  Office Visit with Ilene Tristan MD  [facility] ([facility]) [address] [place]  [facility] [address]  [phone]           Requested Prescriptions   Pending Prescriptions Disp Refills     escitalopram (LEXAPRO) 20 MG tablet [Pharmacy Med Name: ESCITALOPRAM 20 MG TABLET] 90 tablet 0     Sig: TAKE 1 TABLET BY MOUTH EVERY DAY       SSRIs Protocol Passed - 12/18/2019 12:08 PM        Passed - PHQ-9 score less than 5 in past 6 months     Please review last PHQ-9 score.           Passed - Medication is active on med list        Passed - Patient is age 18 or older        Passed - No active pregnancy on record        Passed - No positive pregnancy test in last 12 months        Passed - Recent (6 mo) or future (30 days) visit within the authorizing provider's specialty     Patient had office visit in the last 6 months or has a visit in the next 30 days with authorizing provider or within the authorizing provider's  "specialty.  See \"Patient Info\" tab in inbasket, or \"Choose Columns\" in Meds & Orders section of the refill encounter.            loratadine (CLARITIN) 10 MG tablet [Pharmacy Med Name: LORATADINE 10 MG TABLET] 90 tablet 1     Sig: TAKE 1 TABLET BY MOUTH EVERY DAY       Antihistamines Protocol Failed - 12/18/2019 12:08 PM        Failed - Patient is 3-64 years of age     Apply weight-based dosing for peds patients age 3 - 12 years of age.    Forward request to provider for patients under the age of 3 or over the age of 64.          Passed - Recent (12 mo) or future (30 days) visit within the authorizing provider's specialty     Patient has had an office visit with the authorizing provider or a provider within the authorizing providers department within the previous 12 mos or has a future within next 30 days. See \"Patient Info\" tab in inbasket, or \"Choose Columns\" in Meds & Orders section of the refill encounter.              Passed - Medication is active on med list          "

## 2019-12-20 RX ORDER — LORATADINE 10 MG/1
TABLET ORAL
Qty: 90 TABLET | Refills: 1 | Status: SHIPPED | OUTPATIENT
Start: 2019-12-20 | End: 2020-03-17

## 2019-12-20 RX ORDER — ESCITALOPRAM OXALATE 20 MG/1
TABLET ORAL
Qty: 90 TABLET | Refills: 1 | Status: SHIPPED | OUTPATIENT
Start: 2019-12-20 | End: 2020-06-15

## 2019-12-23 ENCOUNTER — TELEPHONE (OUTPATIENT)
Dept: RHEUMATOLOGY | Facility: CLINIC | Age: 79
End: 2019-12-23

## 2019-12-23 NOTE — TELEPHONE ENCOUNTER
[facility] Call Center    Phone Message    May a detailed message be left on voicemail: yes    Reason for Call: Other: Sister Sita calling to request a discussion about increasing her medicaitons.  She states that today, her joints are really causing her some pain and she is wanting to increase her medications.  Please call her back to advise     Action Taken: Message routed to:  [facility] ([facility]

## 2019-12-24 NOTE — TELEPHONE ENCOUNTER
Called and spoke with pt, she was having increased joint pain yesterday, it was so bad that it woke her in the middle of the night, she took tylenol 3 times, warm showers and moved-walked a lot. Today things are better.  She was struggling yesterday.    She is fine today.  She will call if things change.    Kamille Ruffin RN  Rheumatology Clinic

## 2020-01-03 ENCOUNTER — TELEPHONE (OUTPATIENT)
Dept: RHEUMATOLOGY | Facility: CLINIC | Age: 80
End: 2020-01-03

## 2020-01-03 NOTE — TELEPHONE ENCOUNTER
Discussed with Dr. Lopez, she would like pt to increase prednisone to 20 mg a day x 5 days and then decrease to 7.5 mg daily until seen by Dr. Lopez on 1/15/2020, and she should call on Monday if not feeling better.      Called and updated pt. She will follow instructions. No further questions at this time.    Kamille Ruffin RN  Rheumatology Clinic

## 2020-01-03 NOTE — TELEPHONE ENCOUNTER
Pt called into clinic, has been having increased joint pain for the last 3-4 weeks.  Is having more pain in right more than left but is in both.  She is struggling as the pain is keeping her up at night.  She is not having any stiffness in the morning, but the pain has not gotten better, usually only lasts 2-3 days.      She thought it would be get better, but is has not.  Tylenol has been used, taking 1000 mg TID, gets some relief but is does not last. Warm showers help but only as long as she is in.     She is wondering if she can try a burst of prednisone as this has helped in the past.    Will send to Dr. Lopez to review and advise.    Kamille Ruffin RN  Rheumatology Clinic

## 2020-01-05 DIAGNOSIS — E11.9 TYPE 2 DIABETES MELLITUS WITHOUT COMPLICATION, WITHOUT LONG-TERM CURRENT USE OF INSULIN (H): ICD-10-CM

## 2020-01-06 DIAGNOSIS — E11.9 TYPE 2 DIABETES MELLITUS WITHOUT COMPLICATION, WITHOUT LONG-TERM CURRENT USE OF INSULIN (H): ICD-10-CM

## 2020-01-06 RX ORDER — METFORMIN HCL 500 MG
TABLET, EXTENDED RELEASE 24 HR ORAL
Start: 2020-01-06

## 2020-01-06 RX ORDER — PEN NEEDLE, DIABETIC 32GX 5/32"
NEEDLE, DISPOSABLE MISCELLANEOUS
Qty: 400 EACH | Refills: 1 | Status: SHIPPED | OUTPATIENT
Start: 2020-01-06 | End: 2020-02-17

## 2020-01-06 NOTE — TELEPHONE ENCOUNTER
Prescription approved per [facility] Refill Protocol.  Sophia Hemphill RN    Last Written Prescription Date: 6/10/2019  Last Fill Quantity: 400,  # refills: 1   Last office visit: 12/3/2019 with prescribing provider:  yes   Future Office Visit:   Next 5 appointments (look out 90 days)    Jan 21, 2020 10:00 AM CST  Office Visit with Ilene Tristan MD  [facility] ([facility]) [address] [place]  [facility] [address]  [phone]   Jan 21, 2020 10:30 AM CST  SHORT with Sabrina Meek [facility] ([facility]) [address]  [address]  [facility] [address]  [phone]   Feb 04, 2020  1:00 PM CST  Office Visit with Ilene Tristan MD  [facility] ([facility]) [address] [place]  [facility] [address]  [phone]

## 2020-01-06 NOTE — TELEPHONE ENCOUNTER
See TE from 12/5/2019  Metformin stopped 10/22/2019  Denied refill request  Lydia HALEY RN    Last Written Prescription Date:    Last Fill Quantity: ,  # refills:    Last office visit: 12/3/2019 with prescribing provider:     Future Office Visit:   Next 5 appointments (look out 90 days)    Jan 21, 2020 10:00 AM CST  Office Visit with Ilene rTistan MD  [facility] ([facility]) [address] [place]  [facility] [place] [address]  [phone]   Jan 21, 2020 10:30 AM CST  SHORT with Sabrina Meek [facility] ([facility]) [address]  [address]  [facility] [address]  [phone]   Feb 04, 2020  1:00 PM CST  Office Visit with Ilene Tristan MD  [facility] ([facility]) [address] [place]  [facility] [place] [address]  [phone]         Requested Prescriptions   Pending Prescriptions Disp Refills     metFORMIN (GLUCOPHAGE-XR) 500 MG 24 hr tablet [Pharmacy Med Name: METFORMIN HCL  MG TABLET] 180 tablet 0     Sig: TAKE 2 TABLETS BY MOUTH DAILY WITH DINNER       Biguanide Agents Failed - 1/5/2020  4:39 PM        Failed - Blood pressure less than 140/90 in past 6 months     BP Readings from Last 3 Encounters:   12/04/19 (!) 150/64   12/03/19 128/77   11/14/19 122/77                 Failed - Medication is active on med list        Passed - Patient has documented LDL within the past 12 mos.     Recent Labs   Lab Test 04/09/19  1017   LDL 29             Passed - Patient has had a Microalbumin in the past 15 mos.     Recent Labs   Lab Test 10/22/19  1414   MICROL 9   UMALCR 25.48*             Passed - Patient is age 10 or older        Passed - Patient has documented A1c within the specified period of time.     If HgbA1C is 8 or greater, it needs to be on file within the past 3 months.  If less than 8, must be on file within the past 6 months.     Recent Labs   Lab Test 10/22/19  1416   A1C 6.3*  "            Passed - Patient's CR is NOT>1.4 OR Patient's EGFR is NOT<45 within past 12 mos.     Recent Labs   Lab Test 12/03/19  1049   GFRESTIMATED 55*   GFRESTBLACK 64       Recent Labs   Lab Test 12/03/19  1049   CR 0.97             Passed - Patient does NOT have a diagnosis of CHF.        Passed - Patient is not pregnant        Passed - Patient has not had a positive pregnancy test within the past 12 mos.         Passed - Recent (6 mo) or future (30 days) visit within the authorizing provider's specialty     Patient had office visit in the last 6 months or has a visit in the next 30 days with authorizing provider or within the authorizing provider's specialty.  See \"Patient Info\" tab in inbasket, or \"Choose Columns\" in Meds & Orders section of the refill encounter.            "

## 2020-01-13 DIAGNOSIS — I48.91 ATRIAL FIBRILLATION WITH CONTROLLED VENTRICULAR RESPONSE (H): ICD-10-CM

## 2020-01-14 ENCOUNTER — TELEPHONE (OUTPATIENT)
Dept: RHEUMATOLOGY | Facility: CLINIC | Age: 80
End: 2020-01-14

## 2020-01-14 NOTE — TELEPHONE ENCOUNTER
Left message for pt reminding them of upcoming appointment.  Instructed pt to bring updated medications list.  Heidi Swenson, CMA

## 2020-01-14 NOTE — TELEPHONE ENCOUNTER
metoprolol succinate (TOPROL-XL) 100 MG 24 hr   Last Written Prescription Date:  10/22/18  Last Fill Quantity: 90,   # refills: 3  Last Office Visit : 8/8/19  Future Office visit:  NONE    1 YR F/U    AUG 2019     Routing refill request to provider for review/approval because:    ** CHANGE  MED  DOSE / DIRECTIONS?   FYI :  90 DAY RF FOR BP > 140/90    COORD  has been notified to contact the pt for appointment.

## 2020-01-15 ENCOUNTER — OFFICE VISIT (OUTPATIENT)
Dept: RHEUMATOLOGY | Facility: CLINIC | Age: 80
End: 2020-01-15
Attending: INTERNAL MEDICINE
Payer: MEDICARE

## 2020-01-15 VITALS
OXYGEN SATURATION: 97 % | BODY MASS INDEX: 33.75 KG/M2 | DIASTOLIC BLOOD PRESSURE: 76 MMHG | HEART RATE: 60 BPM | SYSTOLIC BLOOD PRESSURE: 128 MMHG | TEMPERATURE: 98.4 F | WEIGHT: 209 LBS

## 2020-01-15 DIAGNOSIS — E55.9 VITAMIN D DEFICIENCY: ICD-10-CM

## 2020-01-15 DIAGNOSIS — M70.71 BURSITIS OF RIGHT HIP, UNSPECIFIED BURSA: ICD-10-CM

## 2020-01-15 DIAGNOSIS — M70.72 BURSITIS OF LEFT HIP, UNSPECIFIED BURSA: ICD-10-CM

## 2020-01-15 DIAGNOSIS — M70.72 BURSITIS OF LEFT HIP, UNSPECIFIED BURSA: Primary | ICD-10-CM

## 2020-01-15 DIAGNOSIS — M35.02 SJOGREN'S SYNDROME WITH LUNG INVOLVEMENT (H): ICD-10-CM

## 2020-01-15 DIAGNOSIS — Z79.52 CURRENT CHRONIC USE OF SYSTEMIC STEROIDS: ICD-10-CM

## 2020-01-15 DIAGNOSIS — M85.80 OSTEOPENIA, UNSPECIFIED LOCATION: ICD-10-CM

## 2020-01-15 LAB
ALBUMIN SERPL-MCNC: 3.6 G/DL (ref 3.4–5)
ALBUMIN UR-MCNC: NEGATIVE MG/DL
ALT SERPL W P-5'-P-CCNC: 21 U/L (ref 0–50)
APPEARANCE UR: CLEAR
AST SERPL W P-5'-P-CCNC: 14 U/L (ref 0–45)
BASOPHILS # BLD AUTO: 0 10E9/L (ref 0–0.2)
BASOPHILS NFR BLD AUTO: 0.3 %
BILIRUB UR QL STRIP: NEGATIVE
COLOR UR AUTO: ABNORMAL
CREAT SERPL-MCNC: 1.01 MG/DL (ref 0.52–1.04)
CREAT UR-MCNC: 22 MG/DL
CRP SERPL-MCNC: 31 MG/L (ref 0–8)
DIFFERENTIAL METHOD BLD: ABNORMAL
EOSINOPHIL # BLD AUTO: 0 10E9/L (ref 0–0.7)
EOSINOPHIL NFR BLD AUTO: 0.4 %
ERYTHROCYTE [DISTWIDTH] IN BLOOD BY AUTOMATED COUNT: 13.8 % (ref 10–15)
ERYTHROCYTE [SEDIMENTATION RATE] IN BLOOD BY WESTERGREN METHOD: 22 MM/H (ref 0–30)
GFR SERPL CREATININE-BSD FRML MDRD: 53 ML/MIN/{1.73_M2}
GLUCOSE UR STRIP-MCNC: NEGATIVE MG/DL
HCT VFR BLD AUTO: 43.6 % (ref 35–47)
HGB BLD-MCNC: 14 G/DL (ref 11.7–15.7)
HGB UR QL STRIP: NEGATIVE
IMM GRANULOCYTES # BLD: 0.1 10E9/L (ref 0–0.4)
IMM GRANULOCYTES NFR BLD: 0.5 %
KETONES UR STRIP-MCNC: NEGATIVE MG/DL
LEUKOCYTE ESTERASE UR QL STRIP: ABNORMAL
LYMPHOCYTES # BLD AUTO: 0.9 10E9/L (ref 0.8–5.3)
LYMPHOCYTES NFR BLD AUTO: 8.2 %
MCH RBC QN AUTO: 30.9 PG (ref 26.5–33)
MCHC RBC AUTO-ENTMCNC: 32.1 G/DL (ref 31.5–36.5)
MCV RBC AUTO: 96 FL (ref 78–100)
MONOCYTES # BLD AUTO: 0.9 10E9/L (ref 0–1.3)
MONOCYTES NFR BLD AUTO: 8.7 %
MUCOUS THREADS #/AREA URNS LPF: PRESENT /LPF
NEUTROPHILS # BLD AUTO: 8.7 10E9/L (ref 1.6–8.3)
NEUTROPHILS NFR BLD AUTO: 81.9 %
NITRATE UR QL: NEGATIVE
NRBC # BLD AUTO: 0 10*3/UL
NRBC BLD AUTO-RTO: 0 /100
PH UR STRIP: 6 PH (ref 5–7)
PLATELET # BLD AUTO: 192 10E9/L (ref 150–450)
PROT UR-MCNC: 0.09 G/L
PROT/CREAT 24H UR: 0.42 G/G CR (ref 0–0.2)
RBC # BLD AUTO: 4.53 10E12/L (ref 3.8–5.2)
RBC #/AREA URNS AUTO: 1 /HPF (ref 0–2)
SOURCE: ABNORMAL
SP GR UR STRIP: 1.01 (ref 1–1.03)
SQUAMOUS #/AREA URNS AUTO: <1 /HPF (ref 0–1)
UROBILINOGEN UR STRIP-MCNC: 0 MG/DL (ref 0–2)
WBC # BLD AUTO: 10.6 10E9/L (ref 4–11)
WBC #/AREA URNS AUTO: 2 /HPF (ref 0–5)

## 2020-01-15 PROCEDURE — 82306 VITAMIN D 25 HYDROXY: CPT | Performed by: INTERNAL MEDICINE

## 2020-01-15 PROCEDURE — 82565 ASSAY OF CREATININE: CPT | Performed by: INTERNAL MEDICINE

## 2020-01-15 PROCEDURE — 25000125 ZZHC RX 250: Mod: ZF | Performed by: INTERNAL MEDICINE

## 2020-01-15 PROCEDURE — 00000402 ZZHCL STATISTIC TOTAL PROTEIN: Performed by: INTERNAL MEDICINE

## 2020-01-15 PROCEDURE — 86160 COMPLEMENT ANTIGEN: CPT | Performed by: INTERNAL MEDICINE

## 2020-01-15 PROCEDURE — G0463 HOSPITAL OUTPT CLINIC VISIT: HCPCS | Mod: ZF

## 2020-01-15 PROCEDURE — 85025 COMPLETE CBC W/AUTO DIFF WBC: CPT | Performed by: INTERNAL MEDICINE

## 2020-01-15 PROCEDURE — 85652 RBC SED RATE AUTOMATED: CPT | Performed by: INTERNAL MEDICINE

## 2020-01-15 PROCEDURE — 82040 ASSAY OF SERUM ALBUMIN: CPT | Performed by: INTERNAL MEDICINE

## 2020-01-15 PROCEDURE — 86140 C-REACTIVE PROTEIN: CPT | Performed by: INTERNAL MEDICINE

## 2020-01-15 PROCEDURE — 84450 TRANSFERASE (AST) (SGOT): CPT | Performed by: INTERNAL MEDICINE

## 2020-01-15 PROCEDURE — 82595 ASSAY OF CRYOGLOBULIN: CPT | Performed by: INTERNAL MEDICINE

## 2020-01-15 PROCEDURE — 84156 ASSAY OF PROTEIN URINE: CPT | Performed by: INTERNAL MEDICINE

## 2020-01-15 PROCEDURE — 84460 ALANINE AMINO (ALT) (SGPT): CPT | Performed by: INTERNAL MEDICINE

## 2020-01-15 PROCEDURE — 25000128 H RX IP 250 OP 636: Mod: ZF | Performed by: INTERNAL MEDICINE

## 2020-01-15 PROCEDURE — 81001 URINALYSIS AUTO W/SCOPE: CPT | Performed by: INTERNAL MEDICINE

## 2020-01-15 PROCEDURE — 36415 COLL VENOUS BLD VENIPUNCTURE: CPT | Performed by: INTERNAL MEDICINE

## 2020-01-15 PROCEDURE — 84165 PROTEIN E-PHORESIS SERUM: CPT | Performed by: INTERNAL MEDICINE

## 2020-01-15 RX ORDER — LIDOCAINE HYDROCHLORIDE 10 MG/ML
1 INJECTION, SOLUTION EPIDURAL; INFILTRATION; INTRACAUDAL; PERINEURAL ONCE
Status: COMPLETED | OUTPATIENT
Start: 2020-01-15 | End: 2020-01-15

## 2020-01-15 RX ORDER — METHYLPREDNISOLONE ACETATE 40 MG/ML
40 INJECTION, SUSPENSION INTRA-ARTICULAR; INTRALESIONAL; INTRAMUSCULAR; SOFT TISSUE ONCE
Status: COMPLETED | OUTPATIENT
Start: 2020-01-15 | End: 2020-01-15

## 2020-01-15 RX ORDER — PREDNISONE 5 MG/1
5 TABLET ORAL DAILY
Qty: 90 TABLET | Refills: 1 | Status: SHIPPED | OUTPATIENT
Start: 2020-01-15 | End: 2020-06-11

## 2020-01-15 RX ADMIN — LIDOCAINE HYDROCHLORIDE 1 ML: 10 INJECTION, SOLUTION EPIDURAL; INFILTRATION; INTRACAUDAL; PERINEURAL at 17:26

## 2020-01-15 RX ADMIN — METHYLPREDNISOLONE ACETATE 40 MG: 40 INJECTION, SUSPENSION INTRA-ARTICULAR; INTRALESIONAL; INTRAMUSCULAR; SOFT TISSUE at 17:25

## 2020-01-15 RX ADMIN — METHYLPREDNISOLONE ACETATE 40 MG: 40 INJECTION, SUSPENSION INTRA-ARTICULAR; INTRALESIONAL; INTRAMUSCULAR; SOFT TISSUE at 17:26

## 2020-01-15 ASSESSMENT — PAIN SCALES - GENERAL: PAINLEVEL: SEVERE PAIN (7)

## 2020-01-15 NOTE — NURSING NOTE
Chief Complaint   Patient presents with     RECHECK     Sjogrens        /76 (BP Location: Right arm, Patient Position: Sitting, Cuff Size: Adult Regular)   Pulse 60   Temp 98.4  F (36.9  C) (Oral)   Wt 94.8 kg (209 lb)   SpO2 97%   BMI 33.75 kg/m        Kary Davis CMA [facility]    1/15/2020 3:58 PM

## 2020-01-15 NOTE — LETTER
Patient:  Betty Tee  :   1940  MRN:     [id number]        Ms.Colleen GRISELDA Tee  [address] [facility] [address]        2020    Dear ,    We are writing to inform you of your test results. Improved CRP inflammation marker. There is faint monoclonal protein (abnormal protein) which could be a lab error. I ordered additional labs to make sure it is not present. Also recommend vit D 2000 units a day to keep vit D at goal, around 40.      Results for orders placed or performed in visit on 01/15/20   Vitamin D Deficiency     Status: None   Result Value Ref Range    Vitamin D Deficiency screening 31 20 - 75 ug/L   Protein electrophoresis     Status: Abnormal   Result Value Ref Range    Albumin Fraction 4.0 3.7 - 5.1 g/dL    Alpha 1 Fraction 0.4 0.2 - 0.4 g/dL    Alpha 2 Fraction 1.1 (H) 0.5 - 0.9 g/dL    Beta Fraction 1.2 (H) 0.6 - 1.0 g/dL    Gamma Fraction 1.2 0.7 - 1.6 g/dL    Monoclonal Peak 0.1 (H) 0.0 g/dL    ELP Interpretation:       Possible faint monoclonal protein (0.1 or less g/dL) seen in the gamma fraction, not   previously characterized in our laboratory. Recommend serum and urine immunofixation for   confirmation and further characterization if not previously performed elsewhere. Increased   alpha 2 and beta fractions.  Pathologic significance requires clinical correlation.  NATE Leong M.D., Ph.D., Pathologist.     Cryoglobulin quantitative     Status: Abnormal   Result Value Ref Range    Cryoglobulin Trace (A) NEG^Negative %   Creatinine random urine     Status: None   Result Value Ref Range    Creatinine Urine Random 22 mg/dL   Protein  random urine with Creat Ratio     Status: Abnormal   Result Value Ref Range    Protein Random Urine 0.09 g/L    Protein Total Urine g/gr Creatinine 0.42 (H) 0 - 0.2 g/g Cr   UA with Microscopic reflex to Culture     Status: Abnormal   Result Value Ref Range    Color Urine Straw     Appearance Urine Clear     Glucose  Urine Negative NEG^Negative mg/dL    Bilirubin Urine Negative NEG^Negative    Ketones Urine Negative NEG^Negative mg/dL    Specific Gravity Urine 1.008 1.003 - 1.035    Blood Urine Negative NEG^Negative    pH Urine 6.0 5.0 - 7.0 pH    Protein Albumin Urine Negative NEG^Negative mg/dL    Urobilinogen mg/dL 0.0 0.0 - 2.0 mg/dL    Nitrite Urine Negative NEG^Negative    Leukocyte Esterase Urine Trace (A) NEG^Negative    Source Midstream Urine     WBC Urine 2 0 - 5 /HPF    RBC Urine 1 0 - 2 /HPF    Squamous Epithelial /HPF Urine <1 0 - 1 /HPF    Mucous Urine Present (A) NEG^Negative /LPF   Erythrocyte sedimentation rate auto     Status: None   Result Value Ref Range    Sed Rate 22 0 - 30 mm/h   CRP inflammation     Status: Abnormal   Result Value Ref Range    CRP Inflammation 31.0 (H) 0.0 - 8.0 mg/L   Complement C3     Status: None   Result Value Ref Range    Complement C3 147 81 - 157 mg/dL   Complement C4     Status: None   Result Value Ref Range    Complement C4 35 13 - 39 mg/dL   Creatinine     Status: Abnormal   Result Value Ref Range    Creatinine 1.01 0.52 - 1.04 mg/dL    GFR Estimate 53 (L) >60 mL/min/[1.73_m2]    GFR Estimate If Black 61 >60 mL/min/[1.73_m2]   CBC with platelets differential     Status: Abnormal   Result Value Ref Range    WBC 10.6 4.0 - 11.0 10e9/L    RBC Count 4.53 3.8 - 5.2 10e12/L    Hemoglobin 14.0 11.7 - 15.7 g/dL    Hematocrit 43.6 35.0 - 47.0 %    MCV 96 78 - 100 fl    MCH 30.9 26.5 - 33.0 pg    MCHC 32.1 31.5 - 36.5 g/dL    RDW 13.8 10.0 - 15.0 %    Platelet Count 192 150 - 450 10e9/L    Diff Method Automated Method     % Neutrophils 81.9 %    % Lymphocytes 8.2 %    % Monocytes 8.7 %    % Eosinophils 0.4 %    % Basophils 0.3 %    % Immature Granulocytes 0.5 %    Nucleated RBCs 0 0 /100    Absolute Neutrophil 8.7 (H) 1.6 - 8.3 10e9/L    Absolute Lymphocytes 0.9 0.8 - 5.3 10e9/L    Absolute Monocytes 0.9 0.0 - 1.3 10e9/L    Absolute Eosinophils 0.0 0.0 - 0.7 10e9/L    Absolute Basophils  0.0 0.0 - 0.2 10e9/L    Abs Immature Granulocytes 0.1 0 - 0.4 10e9/L    Absolute Nucleated RBC 0.0    AST     Status: None   Result Value Ref Range    AST 14 0 - 45 U/L   Albumin level     Status: None   Result Value Ref Range    Albumin 3.6 3.4 - 5.0 g/dL   ALT     Status: None   Result Value Ref Range    ALT 21 0 - 50 U/L       Zulma Lopez MD

## 2020-01-15 NOTE — NURSING NOTE
Procedure completed per organizational policy, [place] Protocol Safety Checklist for Non-OR Invasive Procedures completed and sent for scan.   [facility]

## 2020-01-15 NOTE — LETTER
1/15/2020       RE: Betty Tee  [address]  [facility] [address]     Dear Colleague,    Thank you for referring your patient, Betty Tee, to the [facility] RHEUMATOLOGY at [facility]. Please see a copy of my visit note below.    [facility]  Rheumatology Clinic  Zulma Lopez MD  DOS:  01/15/2020   Date of last visit: 2019    Name: Betty Tee  MRN: [id number]  Age: 79 year old  : 1940  Reason for visit: Urgent visit for B/L hip pain, primary Sjogren's syndrome    #B/L hip trochanteric bursitis  # Sjogren's syndrome since  with borderline +RG, +SSA, and lip biopsy focus score of 1.  # prednisone-responsive polyarthritis  # Follicular bronchiolitis, prior mild ILD  # Osteopenia on DEXA 2019 with T score=-2.1 with decline in bone density  # Chronic prednisone use  # DM  # A fib     Assessment and Plan:  Primary Sjogren's syndrome with ILD   Sister Sita returns with stable PFTs and improvement on most recent chest CT showing bronchiolitis, but no ILD. Completed pulmonary rehab in 2019 where she was able to exercise without oxygen.     On HCQ since 2019 with significant  Improvement of joint pain. Is here today to follow up and due to recent pain in bilateral hips. Pain is lateral and reproducible with palpation over greater trochanter. Recent increase in prednisone did not help pain, but lidocaine patches did help. Thus, likely bilateral bursitis rather than inflammatory flare.    Tolerates HCQ well. Current prednisone dose is 7.5 mg daily - had tapered to 5 mg in 2019 but increased with recent pain. Her dry mouth is tolerable. Breathing is stable. No other complaints today.      Plan:  2019: Prednisone taper: 6/7 mg every other day x one month, 6 mg a day x one month then 5 mg a day and stay on 5 mg a day. Try evoxac 30 mg three times a day (start with one tab a day) for dry mouth; risks were discussed. Repeat DEXA bone  density, since 2017 DEXA showed bone density and is on chronic steroid, was not interested in fosamax in the past. Urology referral. Labs today. Second dose of shingrix is due between 9/2019-1/2020. Eye exam on plaquenil. Rib x-ray. Return in 4-5 months.    Today (1/15/2019): Bilateral bursitis injection today for lateral hip pain. Referral to physical therapy for bursitis. Continue lidocaine patches as needed. Referral made to endocrinology due to decreasing bone density and history of Fosamax therapy. Labs today. Decrease prednisone back to 5 mg daily with plan to stay there for the time being.    Labs today.    Return to clinic in 4-5 months    Orders Placed This Encounter   Procedures     ALT     Albumin level     AST     CBC with platelets differential     Creatinine     Complement C4     Complement C3     CRP inflammation     Erythrocyte sedimentation rate auto     UA with Microscopic reflex to Culture     Protein  random urine with Creat Ratio     Creatinine random urine     Cryoglobulin quantitative     Protein electrophoresis     Vitamin D Deficiency       Today's plan:    Physical therapy referral    Bursitis shot today    Labs today    Refer to endocrinology for osteopenia    Prednisone 5 mg a day    Return in 4-5 months      I saw and examined the patient with Dr. Lopez. I acted as scribe for Dr. Lopez.    Carmenza Thompson MS4    Attending Note: I saw and examined the patient with medical student Carmenza. This note was written by her, who acted as scribe for me. I agree with findings and recommendations written in this note. The note reflects decisions made by me.    Zulma Lopez MD    Subjective:  Betty Tee is a 79 year old female with a history of primary Sjogren's syndrome who presents for follow-up in the setting of recent pain flare in her bilateral hips x several weeks. The pain has been more intense and longer lasting than previous joint pain. Lidocaine patches have helped, and she  increased her prednisone (20 mg x 5 days, then 7.5 mg through today) per rheumatology but it did not help the pain. She reports this recent pain flare has made her feel discouraged and down.     HPI:   Betty Tee is a 79 year old female with a history of primary Sjogren's syndrome who presents for follow-up. She was diagnosed with Sjogren's syndrome in 2015 with borderline +RG, +SSA, and lip biopsy focus score of 1. Associated ILD and joint pain are prednisone-responsive which support the diagnosis. Ocular staining score has been deferred given other sources of diagnosis.    Interval history: 6/7/18   Today, the patient states she is feeling short of breath as she did not bring her oxygen tank with her. She states she has been feeling a bit more short of breath at home while going up and down her basement steps recently. She last saw her pulmonologist, Dr. Walsh, on 1/10/2018 and does not have recent pulmonary function tests. She has not needed to increase her oxygen levels at home, noting she has always used 4 liters, which seems to be sufficient for her. When she is out of the house she frequently leaves her oxygen tank at home. She does currently have an irritating, non-productive cough that began approximately one week ago with associated postnasal drip. She has been experiencing night sweats and infrequent chills this week but denies any measured fevers.     She denies any joint pains today, but does state she had a flare of joint pain, specifically in her right knee, a few weeks ago. The pain hit very intensely prior to resolving.      With regards to starting rituximab, she voices she discussed this with her primary care provider who does not feel this is a good idea.     Interval history: 3/8/18  Patient reported doing rather well but did mention some trouble with intermittent arthralgias. We discussed consideration of rituximab given problems stemming from long-term prednisone use and she elected to  think about this option. Plan was made for continuation of 10mg of prednisone daily for the time being. Recommended starting Fosamax, 70mg, once per week, however she was hesitant about side effects.     8/16/2019: Former pt of Dr. Stringer is here today to transfer care. Last night, woke up with pain over R ribs. Similar pain happened in 7/2019, self-resolved. Her x-rays at that time were suggestive of questionable rib Fx. On  mg bid+prednisone 6/8 mg every other day (5/13/2019). Dry mouth is bothersome, using OTC biotene but CPAP makes it worse. Report losing bladder control on occasions.    Today (1/15/2020): Patient returns today with several weeks of intense bilateral hip pain. It's more intense and has been longer-lasting than previous joint pain. She was on a prednisone taper and was at 5 mg daily when this pain started. Per rheumatology, she went up to 20 mg prednisone daily x 5 days, now 7.5 mg daily x 7 days but the prednisone increase did not help the pain. Lidocaine patches have helped her hip pain. Her dry mouth has been tolerable, she's taking evoxac once per day. No changes in breathing.      Review of Systems:   A comprehensive ROS was done. Positives are per HPI.      Active Medications:     Current Outpatient Prescriptions:      ACCU-CHEK SHANNON PLUS test strip, USE TO TEST BLOOD SUGARS 4 TIMES DAILY OR AS DIRECTED, Disp: 400 strip, Rfl: 0     acetaminophen (TYLENOL) 500 MG tablet, Take 500 mg by mouth nightly as needed , Disp: , Rfl:      acyclovir (ZOVIRAX) 400 MG tablet, Take 1 tablet (400 mg) by mouth 3 times daily For a couple days, Disp: 15 tablet, Rfl: 2     acyclovir (ZOVIRAX) 5 % external ointment, Apply topically 6 times daily As needed for outbreaks, Disp: 15 g, Rfl: 3     albuterol (PROAIR HFA, PROVENTIL HFA, VENTOLIN HFA) 108 (90 BASE) MCG/ACT inhaler, Inhale 2 puffs into the lungs every 6 hours, Disp: 1 Inhaler, Rfl: 3     alendronate (FOSAMAX) 70 MG tablet, Take 1 tablet (70 mg)  by mouth every 7 days (Patient not taking: Reported on 10/22/2018), Disp: 4 tablet, Rfl: 11     atorvastatin (LIPITOR) 10 MG tablet, TAKE 1 TABLET (10 MG) BY MOUTH DAILY, Disp: 90 tablet, Rfl: 0     BASAGLAR 100 UNIT/ML injection, Inject 25 Units Subcutaneous daily (to replace lantus), Disp: 15 mL, Rfl: 1     BD JANE U/F 32G X 4 MM insulin pen needle, USE 4 DAILY AS DIRECTED., Disp: 400 each, Rfl: 1     blood glucose monitoring (ACCU-CHEK SHANNON PLUS) test strip, Use to test blood sugar 4 times daily or as directed.  Ok to substitute alternative if insurance prefers., Disp: 120 strip, Rfl: 11     blood glucose monitoring (ACCU-CHEK FASTCLIX) lancets, Use to test blood sugar 4 times daily or as directed.  Ok to substitute alternative if insurance prefers., Disp: 1 Box, Rfl: 11     blood glucose monitoring (ACCU-CHEK MULTICLIX) lancets, Use to test blood sugar 4 times daily or as directed., Disp: 4 Box, Rfl: 3     COMPRESSION STOCKINGS, Wear compression stockings in affected leg (right leg) or both legs most of the time during the day and take them off at night., Disp: 2 each, Rfl: 2     escitalopram (LEXAPRO) 20 MG tablet, TAKE 1 TABLET (20 MG) BY MOUTH DAILY, Disp: 90 tablet, Rfl: 1     fluticasone (FLONASE) 50 MCG/ACT nasal spray, Spray 1-2 sprays into both nostrils daily as needed for allergies, Disp: 1 Bottle, Rfl: 3     fluticasone (FLOVENT HFA) 220 MCG/ACT inhaler, Inhale 2 puffs into the lungs 2 times daily, Disp: 3 Inhaler, Rfl: 3     gabapentin (NEURONTIN) 300 MG capsule, Take 1 capsule (300 mg) by mouth At Bedtime (Patient not taking: Reported on 10/22/2018), Disp: 30 capsule, Rfl: 0     Humidifier MISC, Continuous humidified air via concentrator.  Diagnosis: ILD Duration: Lifetime 99., Disp: 1 each, Rfl: 1     insulin pen needle (B-D U/F) 31G X 8 MM, Use 4 times daily (with Lantus and Novolog) or as directed., Disp: 400 each, Rfl: 3     ketoconazole (NIZORAL) 2 % external cream, Apply topically 2 times  daily, Disp: 60 g, Rfl: 1     lidocaine (LIDODERM) 5 % Patch, Apply patch to painful area for up to 12 h within a 24 h period.  Remove after 12 hours. (Patient not taking: Reported on 10/22/2018), Disp: 30 patch, Rfl: 0     lisinopril (PRINIVIL/ZESTRIL) 2.5 MG tablet, Take 1 tablet (2.5 mg) by mouth daily, Disp: 90 tablet, Rfl: 2     metFORMIN (GLUCOPHAGE-XR) 500 MG 24 hr tablet, TAKE 2 TABLETS BY MOUTH DAILY WITH DINNER, Disp: 180 tablet, Rfl: 0     metoprolol succinate (TOPROL XL) 25 MG 24 hr tablet, Take 0.5 tablets (12.5 mg) by mouth 2 times daily Take 50 mg by mouth in combination with 12.5 for a total of 62.5 twice a day, Disp: 90 tablet, Rfl: 3     metoprolol succinate (TOPROL-XL) 100 MG 24 hr tablet, Take 50 mg by mouth in combination with 12.5 for a total of 62.5 twice a day, Disp: 90 tablet, Rfl: 3     montelukast (SINGULAIR) 10 MG tablet, TAKE 1 TABLET BY MOUTH EVERYDAY AT BEDTIME, Disp: 90 tablet, Rfl: 0     NOVOLOG FLEXPEN 100 UNIT/ML soln, INJECT 12 UNITS SUBCUTANEOUS 3 TIMES DAILY (WITH MEALS), Disp: 15 mL, Rfl: 1     order for DME, Equipment being ordered: Oxygen Pulsed oxygen portable tank Rate: 4LNC Diagnosis: ILD Duration: Lifetime -99, Disp: 1 Device, Rfl: 1     order for DME, Equipment being ordered: Full face mask for CPAP - size: F10 large, Disp: 1 each, Rfl: 0     order for DME, Oxygen: Patient requires supplemental Oxygen 4 LPM via nasal canula with activity. Please provide patient with a home unit and with portability capability. Oxygen will be for a lifetime., Disp: 1 Device, Rfl: 0     potassium chloride SA (K-DUR/KLOR-CON M) 20 MEQ CR tablet, Take 2 tabs (40 meq) in am and 1 tab (20 meq) in pm daily, Disp: 270 tablet, Rfl: 3     predniSONE (DELTASONE) 1 MG tablet, Use with 5 mg tabs to taper: 10 mg and 9 mg every other day for 1m, 9 mg/day for 1m, 9 mg and 8 mg every other day for 1m, etc. until at 7mg, Disp: 120 tablet, Rfl: 6     predniSONE (DELTASONE) 5 MG tablet, Use with 1 mg tabs  to taper: 10 mg and 9 mg every other day for 1m, 9 mg/day for 1m, 9 mg and 8 mg every other day for 1m, etc. until at 7mg, Disp: 30 tablet, Rfl: 6     spironolactone (ALDACTONE) 25 MG tablet, Take 2 tablets (50 mg) by mouth daily, Disp: 180 tablet, Rfl: 3     tiZANidine (ZANAFLEX) 2 MG tablet, Take 1-2 tablets (2-4 mg) by mouth 3 times daily, Disp: 90 tablet, Rfl: 1     torsemide (DEMADEX) 20 MG tablet, Take 40 mg in the morning, 30 mg in the afternoon., Disp: 105 tablet, Rfl: 3     traZODone (DESYREL) 50 MG tablet, Take 0.5-1 tablets (25-50 mg) by mouth nightly as needed for sleep, Disp: 30 tablet, Rfl: 5     warfarin (COUMADIN) 7.5 MG tablet, TAKE 1 TABLET BY MOUTH DAILY. CURRENT DOSE IS 7.5 MG DAILY. DOSE ADJUSTED PER INR RESULT., Disp: 90 tablet, Rfl: 3      Allergies:   Augmentin\  Codeine  Phenobarbital  Seasonal allergies      Past Medical History:  Alcohol abuse, in remission.   Allergic rhinitis.   Antiplatelet long-term use.   Atrial fibrillation.   Cardiomegaly.   Osteopenia.   Diverticulosis.   Benign essential hypertension.   GERD.   Insomnia.   Irregular heart beat.   Lumbago.   Major depressive disorder, recurrent episode, moderate.   ANGELICA.  Osteoarthrosis.   Sjogren's syndrome.   Sleep apnea.   Tobacco use disorder.   TMJ disorder.   RLS.  Major depressive disorder.   Hypertension.   Sciatica.   Colouterine fistula.   Left ventricular hypertrophy.   Breat fibroadenoma.   DVT.   Fatty liver disease, nonalcoholic.   Rib pain.   Neck mass.   Interstitial lung disease.   Acute and chronic respiratory failure with hypoxia.   Type II diabetes mellitus.   Hyperlipidemia.   Inflammatory arthritis.      Past Surgical History:  Back surgery.   Left breast biopsy.   Appendectomy.   Exploratory laparotomy.   Cardiac surgery.   Cholecystectomy.   Left colectomy.   Total abdominal hysterectomy with bilateral salpingo-oophorectomy.   Insert ureter stent.   Flexible sigmoidoscopy.   Ileostomy takedown.     Family  History:   Mother: Positive for CAD, heart disease, hypertension, cerebrovascular disease, hyperlipidemia.   Father: Positive for alcohol/drug abuse, Alzheimer disease, dementia, hypertension, hyperlipidemia.   Sister: Positive for CAD, hypertension, and colorectal cancer.   Sister: Positive for hypertension and hyperlipidemia.   Sister: Positive for diabetes, lung cancer, asthma, and heart disease.   Brother: Positive for Parkinsonism and substance abuse.   Brother: Positive for hypertension, diverticulitis, and prostate cancer.   Daughter: Positive for breast cancer.        Social History:   She is a former smoker; quit in 2011. She has a history of alcohol abuse but stopped drinking in 1986.      Physical Exam:   /76 (BP Location: Right arm, Patient Position: Sitting, Cuff Size: Adult Regular)   Pulse 60   Temp 98.4  F (36.9  C) (Oral)   Wt 94.8 kg (209 lb)   SpO2 97%   BMI 33.75 kg/m      Wt Readings from Last 4 Encounters:   12/04/19 91.2 kg (201 lb)   12/03/19 91.2 kg (201 lb)   11/14/19 92.1 kg (203 lb)   10/22/19 92.1 kg (203 lb)     Constitutional: Well-developed, appearing stated age; cooperative  Eyes: Normal EOM, PERRLA, vision, conjunctiva, sclera  ENT: Normal external ears, noset. No mucous membrane lesions, normal saliva pool  Neck: No mass or thyroid enlargement  Resp: CTAB  CV: no murmurs, rubs or gallops, no edema. Irregular rate, normal rhythm.   GI: soft, no ABD tenderness  : Not tested  Lymph: No cervical, supraclavicular, inguinal or epitrochlear nodes  MS: Right hip very tender to palpation over the greater trochanter, lefthip mildly tender in the same area. No active synovitis or other joint tenderness  Skin: No alopecia, rash  Neuro: grossly non-focal  Psych: Normal affect.    Images:  CT Chest w/o contrast (7/25/18):  Findings remain most consistent with small airway disease  and/or nonclassifiable interstitial lung disease and are not  significantly changed from the March 2017  comparison.    Per radiology.    Laboratory:   RHEUM RESULTS Latest Ref Rng & Units 9/16/2019 10/22/2019 12/3/2019   COMPLEMENT C3 76 - 169 mg/dL - - -   COMPLEMENT C4 15 - 50 mg/dL - - -   SED RATE 0 - 30 mm/h - - -   CRP, INFLAMMATION 0.0 - 8.0 mg/L - - -   CK TOTAL 30 - 225 U/L - - -   RHEUMATOID FACTOR <20 IU/mL - - -   RG SCREEN BY EIA <1.0 - - -   AST 0 - 45 U/L - - -   ALT 0 - 50 U/L - - -   ALBUMIN 3.4 - 5.0 g/dL - - -   WBC 4.0 - 11.0 10e9/L - - -   RBC 3.8 - 5.2 10e12/L - - -   HGB 11.7 - 15.7 g/dL - - -   HCT 35.0 - 47.0 % - - -   MCV 78 - 100 fl - - -   MCHC 31.5 - 36.5 g/dL - - -   RDW 10.0 - 15.0 % - - -    - 450 10e9/L - - -   CREATININE 0.52 - 1.04 mg/dL 0.99 1.10(H) 0.97   GFR ESTIMATE, IF BLACK >60 mL/min/[1.73:m2] 62 55(L) 64   GFR ESTIMATE >60 mL/min/[1.73:m2] 54(L) 48(L) 55(L)    - 1,620 mg/dL - - -   IGA 70 - 380 mg/dL - - -   IGM 60 - 265 mg/dL - - -       Rheumatoid Factor   Date Value Ref Range Status   07/24/2015 <20 <20 IU/mL Final   ,  ,   Cyclic Cit Pept IgG/IgA   Date Value Ref Range Status   07/24/2015 <20  Interpretation:  Negative   <20 UNITS Final   ,  ,   Scleroderma Antibody Scl-70 CECILIA IgG   Date Value Ref Range Status   07/24/2015  0.0 - 0.9 AI Final    <0.2  Negative   Antibody index (AI) values reflect qualitative changes in antibody   concentration that cannot be directly associated with clinical condition or   disease state.       SSA (Ro) (CECILIA) Antibody, IgG   Date Value Ref Range Status   07/24/2015 1.6 (H) 0.0 - 0.9 AI Final     Comment:     Positive   Antibody index (AI) values reflect qualitative changes in antibody   concentration that cannot be directly associated with clinical condition or   disease state.       SSB (La) (CECILIA) Antibody, IgG   Date Value Ref Range Status   07/24/2015  0.0 - 0.9 AI Final    <0.2  Negative   Antibody index (AI) values reflect qualitative changes in antibody   concentration that cannot be directly associated with clinical  condition or   disease state.       ,  ,   RG Screen by EIA   Date Value Ref Range Status   2015 <1.0  Interpretation:  Negative   <1.0 Final   ,  ,  ,  ,  ,  ,  ,  ,  ,  ,  ,  ,  ,  ,  ,  ,  ,  ,   Neutrophil Cytoplasmic IgG Antibody   Date Value Ref Range Status   2015   Final    <1:20  Reference range: <1:20  (Note)  The ANCA IFA is <1:20; therefore, no further testing will  be performed.  INTERPRETIVE INFORMATION: Anti-Neutrophil Cyto Ab, IgG  Neutrophil Cytoplasmic Antibodies (C-ANCA = granular  cytoplasmic staining, P-ANCA = perinuclear staining) are  found in the serum of over 90 percent of patients with  certain necrotizing systemic vasculitides, and usually in  less than 5 percent of patients with collagen vascular  disease or arthritis.  Performed by [facility],  [address] [place] [id number] [phone]  [email or web address], Burke Mckeon MD, Lab. Director       ,  ,  ,  ,  ,  ,  ,   IGG   Date Value Ref Range Status   2015 1320 695 - 1620 mg/dL Final   ,  ,  ,  ,  ,   Scleroderma Antibody Scl-70 CECILIA IgG   Date Value Ref Range Status   2015  0.0 - 0.9 AI Final    <0.2  Negative   Antibody index (AI) values reflect qualitative changes in antibody   concentration that cannot be directly associated with clinical condition or   disease state.       Patient's name: Betty Tee  : 1940  DATE OF PROCEDURE:  1/15/2020  PROCEDURE: B/L hip trochanteric bursitis injection   After a discussion of risks, benefits and side effects of procedure, informed patient consent was obtained and was documented in patient's chart. Time out was performed.  The right+left was marked, prepped and draped in the usual clean fashion. Iodine was used to clean the area and ethyl chloride spray was used for local anesthesia. B/L hip trochanteric bursitis was injected with mixed 1 mL of Lidocaine 1%+40 mg of depomedrol.   The patient tolerated the procedure well and there was no  immediate complications post procedure.     Again, thank you for allowing me to participate in the care of your patient.      Sincerely,    Zulma Lopez MD

## 2020-01-15 NOTE — LETTER
1/15/2020      RE: Betty Tee  [address]  [facility] [id number]       [facility]  Rheumatology Clinic  Zulma Lopez MD  DOS:  01/15/2020   Date of last visit: 2019    Name: Betty Tee  MRN: [id number]  Age: 79 year old  : 1940  Reason for visit: Urgent visit for B/L hip pain, primary Sjogren's syndrome    #B/L hip trochanteric bursitis  # Sjogren's syndrome since  with borderline +RG, +SSA, and lip biopsy focus score of 1.  # prednisone-responsive polyarthritis  # Follicular bronchiolitis, prior mild ILD  # Osteopenia on DEXA 2019 with T score=-2.1 with decline in bone density  # Chronic prednisone use  # DM  # A fib     Assessment and Plan:  Primary Sjogren's syndrome with ILD   Sister Sita returns with stable PFTs and improvement on most recent chest CT showing bronchiolitis, but no ILD. Completed pulmonary rehab in 2019 where she was able to exercise without oxygen.     On HCQ since 2019 with significant  Improvement of joint pain. Is here today to follow up and due to recent pain in bilateral hips. Pain is lateral and reproducible with palpation over greater trochanter. Recent increase in prednisone did not help pain, but lidocaine patches did help. Thus, likely bilateral bursitis rather than inflammatory flare.    Tolerates HCQ well. Current prednisone dose is 7.5 mg daily - had tapered to 5 mg in 2019 but increased with recent pain. Her dry mouth is tolerable. Breathing is stable. No other complaints today.      Plan:  2019: Prednisone taper: 6/7 mg every other day x one month, 6 mg a day x one month then 5 mg a day and stay on 5 mg a day. Try evoxac 30 mg three times a day (start with one tab a day) for dry mouth; risks were discussed. Repeat DEXA bone density, since 2017 DEXA showed bone density and is on chronic steroid, was not interested in fosamax in the past. Urology referral. Labs today. Second dose of shingrix is due between 2019-2020.  Eye exam on plaquenil. Rib x-ray. Return in 4-5 months.    Today (1/15/2019): Bilateral bursitis injection today for lateral hip pain. Referral to physical therapy for bursitis. Continue lidocaine patches as needed. Referral made to endocrinology due to decreasing bone density and history of Fosamax therapy. Labs today. Decrease prednisone back to 5 mg daily with plan to stay there for the time being.    Labs today.    Return to clinic in 4-5 months    Orders Placed This Encounter   Procedures     ALT     Albumin level     AST     CBC with platelets differential     Creatinine     Complement C4     Complement C3     CRP inflammation     Erythrocyte sedimentation rate auto     UA with Microscopic reflex to Culture     Protein  random urine with Creat Ratio     Creatinine random urine     Cryoglobulin quantitative     Protein electrophoresis     Vitamin D Deficiency       Today's plan:    Physical therapy referral    Bursitis shot today    Labs today    Refer to endocrinology for osteopenia    Prednisone 5 mg a day    Return in 4-5 months      I saw and examined the patient with Dr. Lopez. I acted as scribe for Dr. Lopez.    Carmenza Thompson MS4    Attending Note: I saw and examined the patient with medical student Cramenza. This note was written by her, who acted as scribe for me. I agree with findings and recommendations written in this note. The note reflects decisions made by me.    Zulma Lopez MD    Subjective:  Betty Tee is a 79 year old female with a history of primary Sjogren's syndrome who presents for follow-up in the setting of recent pain flare in her bilateral hips x several weeks. The pain has been more intense and longer lasting than previous joint pain. Lidocaine patches have helped, and she increased her prednisone (20 mg x 5 days, then 7.5 mg through today) per rheumatology but it did not help the pain. She reports this recent pain flare has made her feel discouraged and down.     HPI:    Betty Tee is a 79 year old female with a history of primary Sjogren's syndrome who presents for follow-up. She was diagnosed with Sjogren's syndrome in 2015 with borderline +RG, +SSA, and lip biopsy focus score of 1. Associated ILD and joint pain are prednisone-responsive which support the diagnosis. Ocular staining score has been deferred given other sources of diagnosis.    Interval history: 6/7/18   Today, the patient states she is feeling short of breath as she did not bring her oxygen tank with her. She states she has been feeling a bit more short of breath at home while going up and down her basement steps recently. She last saw her pulmonologist, Dr. Walsh, on 1/10/2018 and does not have recent pulmonary function tests. She has not needed to increase her oxygen levels at home, noting she has always used 4 liters, which seems to be sufficient for her. When she is out of the house she frequently leaves her oxygen tank at home. She does currently have an irritating, non-productive cough that began approximately one week ago with associated postnasal drip. She has been experiencing night sweats and infrequent chills this week but denies any measured fevers.     She denies any joint pains today, but does state she had a flare of joint pain, specifically in her right knee, a few weeks ago. The pain hit very intensely prior to resolving.      With regards to starting rituximab, she voices she discussed this with her primary care provider who does not feel this is a good idea.     Interval history: 3/8/18  Patient reported doing rather well but did mention some trouble with intermittent arthralgias. We discussed consideration of rituximab given problems stemming from long-term prednisone use and she elected to think about this option. Plan was made for continuation of 10mg of prednisone daily for the time being. Recommended starting Fosamax, 70mg, once per week, however she was hesitant about side effects.      8/16/2019: Former pt of Dr. Stringer is here today to transfer care. Last night, woke up with pain over R ribs. Similar pain happened in 7/2019, self-resolved. Her x-rays at that time were suggestive of questionable rib Fx. On  mg bid+prednisone 6/8 mg every other day (5/13/2019). Dry mouth is bothersome, using OTC biotene but CPAP makes it worse. Report losing bladder control on occasions.    Today (1/15/2020): Patient returns today with several weeks of intense bilateral hip pain. It's more intense and has been longer-lasting than previous joint pain. She was on a prednisone taper and was at 5 mg daily when this pain started. Per rheumatology, she went up to 20 mg prednisone daily x 5 days, now 7.5 mg daily x 7 days but the prednisone increase did not help the pain. Lidocaine patches have helped her hip pain. Her dry mouth has been tolerable, she's taking evoxac once per day. No changes in breathing.      Review of Systems:   A comprehensive ROS was done. Positives are per HPI.      Active Medications:     Current Outpatient Prescriptions:      ACCU-CHEK SHANNON PLUS test strip, USE TO TEST BLOOD SUGARS 4 TIMES DAILY OR AS DIRECTED, Disp: 400 strip, Rfl: 0     acetaminophen (TYLENOL) 500 MG tablet, Take 500 mg by mouth nightly as needed , Disp: , Rfl:      acyclovir (ZOVIRAX) 400 MG tablet, Take 1 tablet (400 mg) by mouth 3 times daily For a couple days, Disp: 15 tablet, Rfl: 2     acyclovir (ZOVIRAX) 5 % external ointment, Apply topically 6 times daily As needed for outbreaks, Disp: 15 g, Rfl: 3     albuterol (PROAIR HFA, PROVENTIL HFA, VENTOLIN HFA) 108 (90 BASE) MCG/ACT inhaler, Inhale 2 puffs into the lungs every 6 hours, Disp: 1 Inhaler, Rfl: 3     alendronate (FOSAMAX) 70 MG tablet, Take 1 tablet (70 mg) by mouth every 7 days (Patient not taking: Reported on 10/22/2018), Disp: 4 tablet, Rfl: 11     atorvastatin (LIPITOR) 10 MG tablet, TAKE 1 TABLET (10 MG) BY MOUTH DAILY, Disp: 90 tablet, Rfl: 0      BASAGLAR 100 UNIT/ML injection, Inject 25 Units Subcutaneous daily (to replace lantus), Disp: 15 mL, Rfl: 1     BD JANE U/F 32G X 4 MM insulin pen needle, USE 4 DAILY AS DIRECTED., Disp: 400 each, Rfl: 1     blood glucose monitoring (ACCU-CHEK SHANNON PLUS) test strip, Use to test blood sugar 4 times daily or as directed.  Ok to substitute alternative if insurance prefers., Disp: 120 strip, Rfl: 11     blood glucose monitoring (ACCU-CHEK FASTCLIX) lancets, Use to test blood sugar 4 times daily or as directed.  Ok to substitute alternative if insurance prefers., Disp: 1 Box, Rfl: 11     blood glucose monitoring (ACCU-CHEK MULTICLIX) lancets, Use to test blood sugar 4 times daily or as directed., Disp: 4 Box, Rfl: 3     COMPRESSION STOCKINGS, Wear compression stockings in affected leg (right leg) or both legs most of the time during the day and take them off at night., Disp: 2 each, Rfl: 2     escitalopram (LEXAPRO) 20 MG tablet, TAKE 1 TABLET (20 MG) BY MOUTH DAILY, Disp: 90 tablet, Rfl: 1     fluticasone (FLONASE) 50 MCG/ACT nasal spray, Spray 1-2 sprays into both nostrils daily as needed for allergies, Disp: 1 Bottle, Rfl: 3     fluticasone (FLOVENT HFA) 220 MCG/ACT inhaler, Inhale 2 puffs into the lungs 2 times daily, Disp: 3 Inhaler, Rfl: 3     gabapentin (NEURONTIN) 300 MG capsule, Take 1 capsule (300 mg) by mouth At Bedtime (Patient not taking: Reported on 10/22/2018), Disp: 30 capsule, Rfl: 0     Humidifier MISC, Continuous humidified air via concentrator.  Diagnosis: ILD Duration: Lifetime 99., Disp: 1 each, Rfl: 1     insulin pen needle (B-D U/F) 31G X 8 MM, Use 4 times daily (with Lantus and Novolog) or as directed., Disp: 400 each, Rfl: 3     ketoconazole (NIZORAL) 2 % external cream, Apply topically 2 times daily, Disp: 60 g, Rfl: 1     lidocaine (LIDODERM) 5 % Patch, Apply patch to painful area for up to 12 h within a 24 h period.  Remove after 12 hours. (Patient not taking: Reported on 10/22/2018), Disp:  30 patch, Rfl: 0     lisinopril (PRINIVIL/ZESTRIL) 2.5 MG tablet, Take 1 tablet (2.5 mg) by mouth daily, Disp: 90 tablet, Rfl: 2     metFORMIN (GLUCOPHAGE-XR) 500 MG 24 hr tablet, TAKE 2 TABLETS BY MOUTH DAILY WITH DINNER, Disp: 180 tablet, Rfl: 0     metoprolol succinate (TOPROL XL) 25 MG 24 hr tablet, Take 0.5 tablets (12.5 mg) by mouth 2 times daily Take 50 mg by mouth in combination with 12.5 for a total of 62.5 twice a day, Disp: 90 tablet, Rfl: 3     metoprolol succinate (TOPROL-XL) 100 MG 24 hr tablet, Take 50 mg by mouth in combination with 12.5 for a total of 62.5 twice a day, Disp: 90 tablet, Rfl: 3     montelukast (SINGULAIR) 10 MG tablet, TAKE 1 TABLET BY MOUTH EVERYDAY AT BEDTIME, Disp: 90 tablet, Rfl: 0     NOVOLOG FLEXPEN 100 UNIT/ML soln, INJECT 12 UNITS SUBCUTANEOUS 3 TIMES DAILY (WITH MEALS), Disp: 15 mL, Rfl: 1     order for DME, Equipment being ordered: Oxygen Pulsed oxygen portable tank Rate: 4LNC Diagnosis: ILD Duration: Lifetime -99, Disp: 1 Device, Rfl: 1     order for DME, Equipment being ordered: Full face mask for CPAP - size: F10 large, Disp: 1 each, Rfl: 0     order for DME, Oxygen: Patient requires supplemental Oxygen 4 LPM via nasal canula with activity. Please provide patient with a home unit and with portability capability. Oxygen will be for a lifetime., Disp: 1 Device, Rfl: 0     potassium chloride SA (K-DUR/KLOR-CON M) 20 MEQ CR tablet, Take 2 tabs (40 meq) in am and 1 tab (20 meq) in pm daily, Disp: 270 tablet, Rfl: 3     predniSONE (DELTASONE) 1 MG tablet, Use with 5 mg tabs to taper: 10 mg and 9 mg every other day for 1m, 9 mg/day for 1m, 9 mg and 8 mg every other day for 1m, etc. until at 7mg, Disp: 120 tablet, Rfl: 6     predniSONE (DELTASONE) 5 MG tablet, Use with 1 mg tabs to taper: 10 mg and 9 mg every other day for 1m, 9 mg/day for 1m, 9 mg and 8 mg every other day for 1m, etc. until at 7mg, Disp: 30 tablet, Rfl: 6     spironolactone (ALDACTONE) 25 MG tablet, Take 2  tablets (50 mg) by mouth daily, Disp: 180 tablet, Rfl: 3     tiZANidine (ZANAFLEX) 2 MG tablet, Take 1-2 tablets (2-4 mg) by mouth 3 times daily, Disp: 90 tablet, Rfl: 1     torsemide (DEMADEX) 20 MG tablet, Take 40 mg in the morning, 30 mg in the afternoon., Disp: 105 tablet, Rfl: 3     traZODone (DESYREL) 50 MG tablet, Take 0.5-1 tablets (25-50 mg) by mouth nightly as needed for sleep, Disp: 30 tablet, Rfl: 5     warfarin (COUMADIN) 7.5 MG tablet, TAKE 1 TABLET BY MOUTH DAILY. CURRENT DOSE IS 7.5 MG DAILY. DOSE ADJUSTED PER INR RESULT., Disp: 90 tablet, Rfl: 3      Allergies:   Augmentin\  Codeine  Phenobarbital  Seasonal allergies      Past Medical History:  Alcohol abuse, in remission.   Allergic rhinitis.   Antiplatelet long-term use.   Atrial fibrillation.   Cardiomegaly.   Osteopenia.   Diverticulosis.   Benign essential hypertension.   GERD.   Insomnia.   Irregular heart beat.   Lumbago.   Major depressive disorder, recurrent episode, moderate.   ANGELICA.  Osteoarthrosis.   Sjogren's syndrome.   Sleep apnea.   Tobacco use disorder.   TMJ disorder.   RLS.  Major depressive disorder.   Hypertension.   Sciatica.   Colouterine fistula.   Left ventricular hypertrophy.   Breat fibroadenoma.   DVT.   Fatty liver disease, nonalcoholic.   Rib pain.   Neck mass.   Interstitial lung disease.   Acute and chronic respiratory failure with hypoxia.   Type II diabetes mellitus.   Hyperlipidemia.   Inflammatory arthritis.      Past Surgical History:  Back surgery.   Left breast biopsy.   Appendectomy.   Exploratory laparotomy.   Cardiac surgery.   Cholecystectomy.   Left colectomy.   Total abdominal hysterectomy with bilateral salpingo-oophorectomy.   Insert ureter stent.   Flexible sigmoidoscopy.   Ileostomy takedown.     Family History:   Mother: Positive for CAD, heart disease, hypertension, cerebrovascular disease, hyperlipidemia.   Father: Positive for alcohol/drug abuse, Alzheimer disease, dementia, hypertension,  hyperlipidemia.   Sister: Positive for CAD, hypertension, and colorectal cancer.   Sister: Positive for hypertension and hyperlipidemia.   Sister: Positive for diabetes, lung cancer, asthma, and heart disease.   Brother: Positive for Parkinsonism and substance abuse.   Brother: Positive for hypertension, diverticulitis, and prostate cancer.   Daughter: Positive for breast cancer.        Social History:   She is a former smoker; quit in 2011. She has a history of alcohol abuse but stopped drinking in 1986.      Physical Exam:   /76 (BP Location: Right arm, Patient Position: Sitting, Cuff Size: Adult Regular)   Pulse 60   Temp 98.4  F (36.9  C) (Oral)   Wt 94.8 kg (209 lb)   SpO2 97%   BMI 33.75 kg/m      Wt Readings from Last 4 Encounters:   12/04/19 91.2 kg (201 lb)   12/03/19 91.2 kg (201 lb)   11/14/19 92.1 kg (203 lb)   10/22/19 92.1 kg (203 lb)     Constitutional: Well-developed, appearing stated age; cooperative  Eyes: Normal EOM, PERRLA, vision, conjunctiva, sclera  ENT: Normal external ears, noset. No mucous membrane lesions, normal saliva pool  Neck: No mass or thyroid enlargement  Resp: CTAB  CV: no murmurs, rubs or gallops, no edema. Irregular rate, normal rhythm.   GI: soft, no ABD tenderness  : Not tested  Lymph: No cervical, supraclavicular, inguinal or epitrochlear nodes  MS: Right hip very tender to palpation over the greater trochanter, lefthip mildly tender in the same area. No active synovitis or other joint tenderness  Skin: No alopecia, rash  Neuro: grossly non-focal  Psych: Normal affect.    Images:  CT Chest w/o contrast (7/25/18):  Findings remain most consistent with small airway disease  and/or nonclassifiable interstitial lung disease and are not  significantly changed from the March 2017 comparison.    Per radiology.    Laboratory:   RHEUM RESULTS Latest Ref Rng & Units 9/16/2019 10/22/2019 12/3/2019   COMPLEMENT C3 76 - 169 mg/dL - - -   COMPLEMENT C4 15 - 50 mg/dL - - -    SED RATE 0 - 30 mm/h - - -   CRP, INFLAMMATION 0.0 - 8.0 mg/L - - -   CK TOTAL 30 - 225 U/L - - -   RHEUMATOID FACTOR <20 IU/mL - - -   RG SCREEN BY EIA <1.0 - - -   AST 0 - 45 U/L - - -   ALT 0 - 50 U/L - - -   ALBUMIN 3.4 - 5.0 g/dL - - -   WBC 4.0 - 11.0 10e9/L - - -   RBC 3.8 - 5.2 10e12/L - - -   HGB 11.7 - 15.7 g/dL - - -   HCT 35.0 - 47.0 % - - -   MCV 78 - 100 fl - - -   MCHC 31.5 - 36.5 g/dL - - -   RDW 10.0 - 15.0 % - - -    - 450 10e9/L - - -   CREATININE 0.52 - 1.04 mg/dL 0.99 1.10(H) 0.97   GFR ESTIMATE, IF BLACK >60 mL/min/[1.73:m2] 62 55(L) 64   GFR ESTIMATE >60 mL/min/[1.73:m2] 54(L) 48(L) 55(L)    - 1,620 mg/dL - - -   IGA 70 - 380 mg/dL - - -   IGM 60 - 265 mg/dL - - -       Rheumatoid Factor   Date Value Ref Range Status   07/24/2015 <20 <20 IU/mL Final   ,  ,   Cyclic Cit Pept IgG/IgA   Date Value Ref Range Status   07/24/2015 <20  Interpretation:  Negative   <20 UNITS Final   ,  ,   Scleroderma Antibody Scl-70 CECILIA IgG   Date Value Ref Range Status   07/24/2015  0.0 - 0.9 AI Final    <0.2  Negative   Antibody index (AI) values reflect qualitative changes in antibody   concentration that cannot be directly associated with clinical condition or   disease state.       SSA (Ro) (CECILIA) Antibody, IgG   Date Value Ref Range Status   07/24/2015 1.6 (H) 0.0 - 0.9 AI Final     Comment:     Positive   Antibody index (AI) values reflect qualitative changes in antibody   concentration that cannot be directly associated with clinical condition or   disease state.       SSB (La) (CECILIA) Antibody, IgG   Date Value Ref Range Status   07/24/2015  0.0 - 0.9 AI Final    <0.2  Negative   Antibody index (AI) values reflect qualitative changes in antibody   concentration that cannot be directly associated with clinical condition or   disease state.       ,  ,   RG Screen by EIA   Date Value Ref Range Status   07/24/2015 <1.0  Interpretation:  Negative   <1.0 Final   ,  ,  ,  ,  ,  ,  ,  ,  ,  ,  ,  ,  ,   ,  ,  ,  ,  ,   Neutrophil Cytoplasmic IgG Antibody   Date Value Ref Range Status   2015   Final    <1:20  Reference range: <1:20  (Note)  The ANCA IFA is <1:20; therefore, no further testing will  be performed.  INTERPRETIVE INFORMATION: Anti-Neutrophil Cyto Ab, IgG  Neutrophil Cytoplasmic Antibodies (C-ANCA = granular  cytoplasmic staining, P-ANCA = perinuclear staining) are  found in the serum of over 90 percent of patients with  certain necrotizing systemic vasculitides, and usually in  less than 5 percent of patients with collagen vascular  disease or arthritis.  Performed by "[facility]  [address] [place] [id number] [phone]  [email or web address], Burke Mckeon MD, Lab. Director       ,  ,  ,  ,  ,  ,  ,   IGG   Date Value Ref Range Status   2015 1320 695 - 1620 mg/dL Final   ,  ,  ,  ,  ,   Scleroderma Antibody Scl-70 CECILIA IgG   Date Value Ref Range Status   2015  0.0 - 0.9 AI Final    <0.2  Negative   Antibody index (AI) values reflect qualitative changes in antibody   concentration that cannot be directly associated with clinical condition or   disease state.       Patient's name: Betty Tee  : 1940  DATE OF PROCEDURE:  1/15/2020  PROCEDURE: B/L hip trochanteric bursitis injection   After a discussion of risks, benefits and side effects of procedure, informed patient consent was obtained and was documented in patient's chart. Time out was performed.  The right+left was marked, prepped and draped in the usual clean fashion. Iodine was used to clean the area and ethyl chloride spray was used for local anesthesia. B/L hip trochanteric bursitis was injected with mixed 1 mL of Lidocaine 1%+40 mg of depomedrol.   The patient tolerated the procedure well and there was no immediate complications post procedure.     Zulma Lopez MD

## 2020-01-15 NOTE — PATIENT INSTRUCTIONS
Physical therapy referral    Bursitis shot today    Labs today    Refer to endocrinology for osteopenia    Prednisone 5 mg a day    Return in 4-5 months

## 2020-01-15 NOTE — PROGRESS NOTES
[facility]  Rheumatology Clinic  Zulma Lopez MD  DOS:  01/15/2020   Date of last visit: 2019    Name: Betty Tee  MRN: [id number]  Age: 79 year old  : 1940  Reason for visit: Urgent visit for B/L hip pain, primary Sjogren's syndrome    #B/L hip trochanteric bursitis  # Sjogren's syndrome since  with borderline +RG, +SSA, and lip biopsy focus score of 1.  # prednisone-responsive polyarthritis  # Follicular bronchiolitis, prior mild ILD  # Osteopenia on DEXA 2019 with T score=-2.1 with decline in bone density  # Chronic prednisone use  # DM  # A fib     Assessment and Plan:  Primary Sjogren's syndrome with ILD   Sister Sita returns with stable PFTs and improvement on most recent chest CT showing bronchiolitis, but no ILD. Completed pulmonary rehab in 2019 where she was able to exercise without oxygen.     On HCQ since 2019 with significant  Improvement of joint pain. Is here today to follow up and due to recent pain in bilateral hips. Pain is lateral and reproducible with palpation over greater trochanter. Recent increase in prednisone did not help pain, but lidocaine patches did help. Thus, likely bilateral bursitis rather than inflammatory flare.    Tolerates HCQ well. Current prednisone dose is 7.5 mg daily - had tapered to 5 mg in 2019 but increased with recent pain. Her dry mouth is tolerable. Breathing is stable. No other complaints today.      Plan:  2019: Prednisone taper: 6/7 mg every other day x one month, 6 mg a day x one month then 5 mg a day and stay on 5 mg a day. Try evoxac 30 mg three times a day (start with one tab a day) for dry mouth; risks were discussed. Repeat DEXA bone density, since 2017 DEXA showed bone density and is on chronic steroid, was not interested in fosamax in the past. Urology referral. Labs today. Second dose of shingrix is due between 2019-2020. Eye exam on plaquenil. Rib x-ray. Return in 4-5 months.    Today (1/15/2019): Bilateral  bursitis injection today for lateral hip pain. Referral to physical therapy for bursitis. Continue lidocaine patches as needed. Referral made to endocrinology due to decreasing bone density and history of Fosamax therapy. Labs today. Decrease prednisone back to 5 mg daily with plan to stay there for the time being.    Labs today.    Return to clinic in 4-5 months    Orders Placed This Encounter   Procedures     ALT     Albumin level     AST     CBC with platelets differential     Creatinine     Complement C4     Complement C3     CRP inflammation     Erythrocyte sedimentation rate auto     UA with Microscopic reflex to Culture     Protein  random urine with Creat Ratio     Creatinine random urine     Cryoglobulin quantitative     Protein electrophoresis     Vitamin D Deficiency       Today's plan:    Physical therapy referral    Bursitis shot today    Labs today    Refer to endocrinology for osteopenia    Prednisone 5 mg a day    Return in 4-5 months      I saw and examined the patient with Dr. Lopez. I acted as scribe for Dr. Lopez.    Carmenza Thompson MS4    Attending Note: I saw and examined the patient with medical student Carmenza. This note was written by her, who acted as scribe for me. I agree with findings and recommendations written in this note. The note reflects decisions made by me.    Zulma Lopez MD    Subjective:  Betty Tee is a 79 year old female with a history of primary Sjogren's syndrome who presents for follow-up in the setting of recent pain flare in her bilateral hips x several weeks. The pain has been more intense and longer lasting than previous joint pain. Lidocaine patches have helped, and she increased her prednisone (20 mg x 5 days, then 7.5 mg through today) per rheumatology but it did not help the pain. She reports this recent pain flare has made her feel discouraged and down.     HPI:   Betty Tee is a 79 year old female with a history of primary Sjogren's syndrome who  presents for follow-up. She was diagnosed with Sjogren's syndrome in 2015 with borderline +RG, +SSA, and lip biopsy focus score of 1. Associated ILD and joint pain are prednisone-responsive which support the diagnosis. Ocular staining score has been deferred given other sources of diagnosis.    Interval history: 6/7/18   Today, the patient states she is feeling short of breath as she did not bring her oxygen tank with her. She states she has been feeling a bit more short of breath at home while going up and down her basement steps recently. She last saw her pulmonologist, Dr. Walsh, on 1/10/2018 and does not have recent pulmonary function tests. She has not needed to increase her oxygen levels at home, noting she has always used 4 liters, which seems to be sufficient for her. When she is out of the house she frequently leaves her oxygen tank at home. She does currently have an irritating, non-productive cough that began approximately one week ago with associated postnasal drip. She has been experiencing night sweats and infrequent chills this week but denies any measured fevers.     She denies any joint pains today, but does state she had a flare of joint pain, specifically in her right knee, a few weeks ago. The pain hit very intensely prior to resolving.      With regards to starting rituximab, she voices she discussed this with her primary care provider who does not feel this is a good idea.     Interval history: 3/8/18  Patient reported doing rather well but did mention some trouble with intermittent arthralgias. We discussed consideration of rituximab given problems stemming from long-term prednisone use and she elected to think about this option. Plan was made for continuation of 10mg of prednisone daily for the time being. Recommended starting Fosamax, 70mg, once per week, however she was hesitant about side effects.     8/16/2019: Former pt of Dr. Stringer is here today to transfer care. Last night, woke up  with pain over R ribs. Similar pain happened in 7/2019, self-resolved. Her x-rays at that time were suggestive of questionable rib Fx. On  mg bid+prednisone 6/8 mg every other day (5/13/2019). Dry mouth is bothersome, using OTC biotene but CPAP makes it worse. Report losing bladder control on occasions.    Today (1/15/2020): Patient returns today with several weeks of intense bilateral hip pain. It's more intense and has been longer-lasting than previous joint pain. She was on a prednisone taper and was at 5 mg daily when this pain started. Per rheumatology, she went up to 20 mg prednisone daily x 5 days, now 7.5 mg daily x 7 days but the prednisone increase did not help the pain. Lidocaine patches have helped her hip pain. Her dry mouth has been tolerable, she's taking evoxac once per day. No changes in breathing.      Review of Systems:   A comprehensive ROS was done. Positives are per HPI.      Active Medications:     Current Outpatient Prescriptions:      ACCU-CHEK SHANNON PLUS test strip, USE TO TEST BLOOD SUGARS 4 TIMES DAILY OR AS DIRECTED, Disp: 400 strip, Rfl: 0     acetaminophen (TYLENOL) 500 MG tablet, Take 500 mg by mouth nightly as needed , Disp: , Rfl:      acyclovir (ZOVIRAX) 400 MG tablet, Take 1 tablet (400 mg) by mouth 3 times daily For a couple days, Disp: 15 tablet, Rfl: 2     acyclovir (ZOVIRAX) 5 % external ointment, Apply topically 6 times daily As needed for outbreaks, Disp: 15 g, Rfl: 3     albuterol (PROAIR HFA, PROVENTIL HFA, VENTOLIN HFA) 108 (90 BASE) MCG/ACT inhaler, Inhale 2 puffs into the lungs every 6 hours, Disp: 1 Inhaler, Rfl: 3     alendronate (FOSAMAX) 70 MG tablet, Take 1 tablet (70 mg) by mouth every 7 days (Patient not taking: Reported on 10/22/2018), Disp: 4 tablet, Rfl: 11     atorvastatin (LIPITOR) 10 MG tablet, TAKE 1 TABLET (10 MG) BY MOUTH DAILY, Disp: 90 tablet, Rfl: 0     BASAGLAR 100 UNIT/ML injection, Inject 25 Units Subcutaneous daily (to replace lantus),  Disp: 15 mL, Rfl: 1     BD JANE U/F 32G X 4 MM insulin pen needle, USE 4 DAILY AS DIRECTED., Disp: 400 each, Rfl: 1     blood glucose monitoring (ACCU-CHEK SHANNON PLUS) test strip, Use to test blood sugar 4 times daily or as directed.  Ok to substitute alternative if insurance prefers., Disp: 120 strip, Rfl: 11     blood glucose monitoring (ACCU-CHEK FASTCLIX) lancets, Use to test blood sugar 4 times daily or as directed.  Ok to substitute alternative if insurance prefers., Disp: 1 Box, Rfl: 11     blood glucose monitoring (ACCU-CHEK MULTICLIX) lancets, Use to test blood sugar 4 times daily or as directed., Disp: 4 Box, Rfl: 3     COMPRESSION STOCKINGS, Wear compression stockings in affected leg (right leg) or both legs most of the time during the day and take them off at night., Disp: 2 each, Rfl: 2     escitalopram (LEXAPRO) 20 MG tablet, TAKE 1 TABLET (20 MG) BY MOUTH DAILY, Disp: 90 tablet, Rfl: 1     fluticasone (FLONASE) 50 MCG/ACT nasal spray, Spray 1-2 sprays into both nostrils daily as needed for allergies, Disp: 1 Bottle, Rfl: 3     fluticasone (FLOVENT HFA) 220 MCG/ACT inhaler, Inhale 2 puffs into the lungs 2 times daily, Disp: 3 Inhaler, Rfl: 3     gabapentin (NEURONTIN) 300 MG capsule, Take 1 capsule (300 mg) by mouth At Bedtime (Patient not taking: Reported on 10/22/2018), Disp: 30 capsule, Rfl: 0     Humidifier MISC, Continuous humidified air via concentrator.  Diagnosis: ILD Duration: Lifetime 99., Disp: 1 each, Rfl: 1     insulin pen needle (B-D U/F) 31G X 8 MM, Use 4 times daily (with Lantus and Novolog) or as directed., Disp: 400 each, Rfl: 3     ketoconazole (NIZORAL) 2 % external cream, Apply topically 2 times daily, Disp: 60 g, Rfl: 1     lidocaine (LIDODERM) 5 % Patch, Apply patch to painful area for up to 12 h within a 24 h period.  Remove after 12 hours. (Patient not taking: Reported on 10/22/2018), Disp: 30 patch, Rfl: 0     lisinopril (PRINIVIL/ZESTRIL) 2.5 MG tablet, Take 1 tablet (2.5 mg)  by mouth daily, Disp: 90 tablet, Rfl: 2     metFORMIN (GLUCOPHAGE-XR) 500 MG 24 hr tablet, TAKE 2 TABLETS BY MOUTH DAILY WITH DINNER, Disp: 180 tablet, Rfl: 0     metoprolol succinate (TOPROL XL) 25 MG 24 hr tablet, Take 0.5 tablets (12.5 mg) by mouth 2 times daily Take 50 mg by mouth in combination with 12.5 for a total of 62.5 twice a day, Disp: 90 tablet, Rfl: 3     metoprolol succinate (TOPROL-XL) 100 MG 24 hr tablet, Take 50 mg by mouth in combination with 12.5 for a total of 62.5 twice a day, Disp: 90 tablet, Rfl: 3     montelukast (SINGULAIR) 10 MG tablet, TAKE 1 TABLET BY MOUTH EVERYDAY AT BEDTIME, Disp: 90 tablet, Rfl: 0     NOVOLOG FLEXPEN 100 UNIT/ML soln, INJECT 12 UNITS SUBCUTANEOUS 3 TIMES DAILY (WITH MEALS), Disp: 15 mL, Rfl: 1     order for DME, Equipment being ordered: Oxygen Pulsed oxygen portable tank Rate: 4LNC Diagnosis: ILD Duration: Lifetime -99, Disp: 1 Device, Rfl: 1     order for DME, Equipment being ordered: Full face mask for CPAP - size: F10 large, Disp: 1 each, Rfl: 0     order for DME, Oxygen: Patient requires supplemental Oxygen 4 LPM via nasal canula with activity. Please provide patient with a home unit and with portability capability. Oxygen will be for a lifetime., Disp: 1 Device, Rfl: 0     potassium chloride SA (K-DUR/KLOR-CON M) 20 MEQ CR tablet, Take 2 tabs (40 meq) in am and 1 tab (20 meq) in pm daily, Disp: 270 tablet, Rfl: 3     predniSONE (DELTASONE) 1 MG tablet, Use with 5 mg tabs to taper: 10 mg and 9 mg every other day for 1m, 9 mg/day for 1m, 9 mg and 8 mg every other day for 1m, etc. until at 7mg, Disp: 120 tablet, Rfl: 6     predniSONE (DELTASONE) 5 MG tablet, Use with 1 mg tabs to taper: 10 mg and 9 mg every other day for 1m, 9 mg/day for 1m, 9 mg and 8 mg every other day for 1m, etc. until at 7mg, Disp: 30 tablet, Rfl: 6     spironolactone (ALDACTONE) 25 MG tablet, Take 2 tablets (50 mg) by mouth daily, Disp: 180 tablet, Rfl: 3     tiZANidine (ZANAFLEX) 2 MG  tablet, Take 1-2 tablets (2-4 mg) by mouth 3 times daily, Disp: 90 tablet, Rfl: 1     torsemide (DEMADEX) 20 MG tablet, Take 40 mg in the morning, 30 mg in the afternoon., Disp: 105 tablet, Rfl: 3     traZODone (DESYREL) 50 MG tablet, Take 0.5-1 tablets (25-50 mg) by mouth nightly as needed for sleep, Disp: 30 tablet, Rfl: 5     warfarin (COUMADIN) 7.5 MG tablet, TAKE 1 TABLET BY MOUTH DAILY. CURRENT DOSE IS 7.5 MG DAILY. DOSE ADJUSTED PER INR RESULT., Disp: 90 tablet, Rfl: 3      Allergies:   Augmentin\  Codeine  Phenobarbital  Seasonal allergies      Past Medical History:  Alcohol abuse, in remission.   Allergic rhinitis.   Antiplatelet long-term use.   Atrial fibrillation.   Cardiomegaly.   Osteopenia.   Diverticulosis.   Benign essential hypertension.   GERD.   Insomnia.   Irregular heart beat.   Lumbago.   Major depressive disorder, recurrent episode, moderate.   ANGELICA.  Osteoarthrosis.   Sjogren's syndrome.   Sleep apnea.   Tobacco use disorder.   TMJ disorder.   RLS.  Major depressive disorder.   Hypertension.   Sciatica.   Colouterine fistula.   Left ventricular hypertrophy.   Breat fibroadenoma.   DVT.   Fatty liver disease, nonalcoholic.   Rib pain.   Neck mass.   Interstitial lung disease.   Acute and chronic respiratory failure with hypoxia.   Type II diabetes mellitus.   Hyperlipidemia.   Inflammatory arthritis.      Past Surgical History:  Back surgery.   Left breast biopsy.   Appendectomy.   Exploratory laparotomy.   Cardiac surgery.   Cholecystectomy.   Left colectomy.   Total abdominal hysterectomy with bilateral salpingo-oophorectomy.   Insert ureter stent.   Flexible sigmoidoscopy.   Ileostomy takedown.     Family History:   Mother: Positive for CAD, heart disease, hypertension, cerebrovascular disease, hyperlipidemia.   Father: Positive for alcohol/drug abuse, Alzheimer disease, dementia, hypertension, hyperlipidemia.   Sister: Positive for CAD, hypertension, and colorectal cancer.   Sister:  Positive for hypertension and hyperlipidemia.   Sister: Positive for diabetes, lung cancer, asthma, and heart disease.   Brother: Positive for Parkinsonism and substance abuse.   Brother: Positive for hypertension, diverticulitis, and prostate cancer.   Daughter: Positive for breast cancer.        Social History:   She is a former smoker; quit in 2011. She has a history of alcohol abuse but stopped drinking in 1986.      Physical Exam:   /76 (BP Location: Right arm, Patient Position: Sitting, Cuff Size: Adult Regular)   Pulse 60   Temp 98.4  F (36.9  C) (Oral)   Wt 94.8 kg (209 lb)   SpO2 97%   BMI 33.75 kg/m     Wt Readings from Last 4 Encounters:   12/04/19 91.2 kg (201 lb)   12/03/19 91.2 kg (201 lb)   11/14/19 92.1 kg (203 lb)   10/22/19 92.1 kg (203 lb)     Constitutional: Well-developed, appearing stated age; cooperative  Eyes: Normal EOM, PERRLA, vision, conjunctiva, sclera  ENT: Normal external ears, noset. No mucous membrane lesions, normal saliva pool  Neck: No mass or thyroid enlargement  Resp: CTAB  CV: no murmurs, rubs or gallops, no edema. Irregular rate, normal rhythm.   GI: soft, no ABD tenderness  : Not tested  Lymph: No cervical, supraclavicular, inguinal or epitrochlear nodes  MS: Right hip very tender to palpation over the greater trochanter, lefthip mildly tender in the same area. No active synovitis or other joint tenderness  Skin: No alopecia, rash  Neuro: grossly non-focal  Psych: Normal affect.    Images:  CT Chest w/o contrast (7/25/18):  Findings remain most consistent with small airway disease  and/or nonclassifiable interstitial lung disease and are not  significantly changed from the March 2017 comparison.    Per radiology.    Laboratory:   RHEUM RESULTS Latest Ref Rng & Units 9/16/2019 10/22/2019 12/3/2019   COMPLEMENT C3 76 - 169 mg/dL - - -   COMPLEMENT C4 15 - 50 mg/dL - - -   SED RATE 0 - 30 mm/h - - -   CRP, INFLAMMATION 0.0 - 8.0 mg/L - - -   CK TOTAL 30 - 225 U/L -  - -   RHEUMATOID FACTOR <20 IU/mL - - -   RG SCREEN BY EIA <1.0 - - -   AST 0 - 45 U/L - - -   ALT 0 - 50 U/L - - -   ALBUMIN 3.4 - 5.0 g/dL - - -   WBC 4.0 - 11.0 10e9/L - - -   RBC 3.8 - 5.2 10e12/L - - -   HGB 11.7 - 15.7 g/dL - - -   HCT 35.0 - 47.0 % - - -   MCV 78 - 100 fl - - -   MCHC 31.5 - 36.5 g/dL - - -   RDW 10.0 - 15.0 % - - -    - 450 10e9/L - - -   CREATININE 0.52 - 1.04 mg/dL 0.99 1.10(H) 0.97   GFR ESTIMATE, IF BLACK >60 mL/min/[1.73:m2] 62 55(L) 64   GFR ESTIMATE >60 mL/min/[1.73:m2] 54(L) 48(L) 55(L)    - 1,620 mg/dL - - -   IGA 70 - 380 mg/dL - - -   IGM 60 - 265 mg/dL - - -       Rheumatoid Factor   Date Value Ref Range Status   07/24/2015 <20 <20 IU/mL Final   ,  ,   Cyclic Cit Pept IgG/IgA   Date Value Ref Range Status   07/24/2015 <20  Interpretation:  Negative   <20 UNITS Final   ,  ,   Scleroderma Antibody Scl-70 CECILIA IgG   Date Value Ref Range Status   07/24/2015  0.0 - 0.9 AI Final    <0.2  Negative   Antibody index (AI) values reflect qualitative changes in antibody   concentration that cannot be directly associated with clinical condition or   disease state.       SSA (Ro) (CECILIA) Antibody, IgG   Date Value Ref Range Status   07/24/2015 1.6 (H) 0.0 - 0.9 AI Final     Comment:     Positive   Antibody index (AI) values reflect qualitative changes in antibody   concentration that cannot be directly associated with clinical condition or   disease state.       SSB (La) (CECILIA) Antibody, IgG   Date Value Ref Range Status   07/24/2015  0.0 - 0.9 AI Final    <0.2  Negative   Antibody index (AI) values reflect qualitative changes in antibody   concentration that cannot be directly associated with clinical condition or   disease state.       ,  ,   RG Screen by EIA   Date Value Ref Range Status   07/24/2015 <1.0  Interpretation:  Negative   <1.0 Final   ,  ,  ,  ,  ,  ,  ,  ,  ,  ,  ,  ,  ,  ,  ,  ,  ,  ,   Neutrophil Cytoplasmic IgG Antibody   Date Value Ref Range Status   07/24/2015    Final    <1:20  Reference range: <1:20  (Note)  The ANCA IFA is <1:20; therefore, no further testing will  be performed.  INTERPRETIVE INFORMATION: Anti-Neutrophil Cyto Ab, IgG  Neutrophil Cytoplasmic Antibodies (C-ANCA = granular  cytoplasmic staining, P-ANCA = perinuclear staining) are  found in the serum of over 90 percent of patients with  certain necrotizing systemic vasculitides, and usually in  less than 5 percent of patients with collagen vascular  disease or arthritis.  Performed by [facility],  [address] [place] [id number] [phone]  [email or web address], Burke Mckeon MD, Lab. Director       ,  ,  ,  ,  ,  ,  ,   IGG   Date Value Ref Range Status   2015 1320 695 - 1620 mg/dL Final   ,  ,  ,  ,  ,   Scleroderma Antibody Scl-70 CECILIA IgG   Date Value Ref Range Status   2015  0.0 - 0.9 AI Final    <0.2  Negative   Antibody index (AI) values reflect qualitative changes in antibody   concentration that cannot be directly associated with clinical condition or   disease state.       Patient's name: Betty Tee  : 1940  DATE OF PROCEDURE: 1/15/2020  PROCEDURE: B/L hip trochanteric bursitis injection   After a discussion of risks, benefits and side effects of procedure, informed patient consent was obtained and was documented in patient's chart. Time out was performed.  The right+left was marked, prepped and draped in the usual clean fashion. Iodine was used to clean the area and ethyl chloride spray was used for local anesthesia. B/L hip trochanteric bursitis was injected with mixed 1 mL of Lidocaine 1%+40 mg of depomedrol.   The patient tolerated the procedure well and there was no immediate complications post procedure.     Zulma Lopez MD

## 2020-01-16 LAB
ALBUMIN SERPL ELPH-MCNC: 4 G/DL (ref 3.7–5.1)
ALPHA1 GLOB SERPL ELPH-MCNC: 0.4 G/DL (ref 0.2–0.4)
ALPHA2 GLOB SERPL ELPH-MCNC: 1.1 G/DL (ref 0.5–0.9)
B-GLOBULIN SERPL ELPH-MCNC: 1.2 G/DL (ref 0.6–1)
C3 SERPL-MCNC: 147 MG/DL (ref 81–157)
C4 SERPL-MCNC: 35 MG/DL (ref 13–39)
DEPRECATED CALCIDIOL+CALCIFEROL SERPL-MC: 31 UG/L (ref 20–75)
GAMMA GLOB SERPL ELPH-MCNC: 1.2 G/DL (ref 0.7–1.6)
M PROTEIN SERPL ELPH-MCNC: 0.1 G/DL
PROT PATTERN SERPL ELPH-IMP: ABNORMAL

## 2020-01-16 NOTE — TELEPHONE ENCOUNTER
RECORDS RECEIVED FROM: [facility] [employer] [facility]   DATE RECEIVED: 01/16/2020   NOTES (FOR ALL VISITS) STATUS DETAILS   OFFICE NOTES from referring provider Internal 01/15/2020 - Dr. Lopez   OFFICE NOTES from other specialist N/A    ED NOTES N/A    OPERATIVE REPORT  (thyroid, pituitary, adrenal, parathyroid) N/A    MEDICATION LIST Internal    IMAGING      DEXASCAN Internal 08/20/2019   MRI (BRAIN) N/A    XR (Chest) Internal 08/16/2019   CT (HEAD/NECK/CHEST/ABDOMEN) Internal 07/25/2019   NUCLEAR  N/A    ULTRASOUND (HEAD/NECK) Internal 12/19/2017   LABS     DIABETES: HBGA1C, CREATININE, FASTING LIPIDS, MICROALBUMIN URINE, POTASSIUM, TSH, T4    THYROID: TSH, T4, CBC, THYRODLONULIN, TOTAL T3, FREE T4, CALCITONIN, CEA Internal   01/16/2020

## 2020-01-17 DIAGNOSIS — I48.19 PERSISTENT ATRIAL FIBRILLATION (H): ICD-10-CM

## 2020-01-17 DIAGNOSIS — I50.30 (HFPEF) HEART FAILURE WITH PRESERVED EJECTION FRACTION (H): ICD-10-CM

## 2020-01-17 DIAGNOSIS — I48.91 ATRIAL FIBRILLATION WITH CONTROLLED VENTRICULAR RESPONSE (H): ICD-10-CM

## 2020-01-17 RX ORDER — METOPROLOL SUCCINATE 50 MG/1
TABLET, EXTENDED RELEASE ORAL
Qty: 90 TABLET | Refills: 3 | Status: SHIPPED | OUTPATIENT
Start: 2020-01-17 | End: 2020-01-17

## 2020-01-17 RX ORDER — METOPROLOL SUCCINATE 25 MG/1
TABLET, EXTENDED RELEASE ORAL
Qty: 90 TABLET | Refills: 3 | Status: SHIPPED | OUTPATIENT
Start: 2020-01-17 | End: 2021-02-14

## 2020-01-17 RX ORDER — METOPROLOL SUCCINATE 50 MG/1
TABLET, EXTENDED RELEASE ORAL
Qty: 180 TABLET | Refills: 3 | Status: SHIPPED | OUTPATIENT
Start: 2020-01-17 | End: 2021-04-12

## 2020-01-17 NOTE — TELEPHONE ENCOUNTER
Called Sister Randal and confirmed she is taking 62.5 mg twice daily. Would prefer 50 mg tabs instead of 100 mg tabs which she is splitting so updated prescription with this. Also needed refill of 12.5 mg dosage so refill sent through for this as well.   Beatriz Blanco RN

## 2020-01-21 ENCOUNTER — OFFICE VISIT (OUTPATIENT)
Dept: FAMILY MEDICINE | Facility: CLINIC | Age: 80
End: 2020-01-21
Payer: MEDICARE

## 2020-01-21 VITALS
TEMPERATURE: 97.7 F | OXYGEN SATURATION: 97 % | HEART RATE: 53 BPM | HEIGHT: 66 IN | SYSTOLIC BLOOD PRESSURE: 132 MMHG | BODY MASS INDEX: 33.75 KG/M2 | DIASTOLIC BLOOD PRESSURE: 84 MMHG

## 2020-01-21 DIAGNOSIS — N18.30 CKD (CHRONIC KIDNEY DISEASE) STAGE 3, GFR 30-59 ML/MIN (H): ICD-10-CM

## 2020-01-21 DIAGNOSIS — Z23 NEED FOR SHINGLES VACCINE: ICD-10-CM

## 2020-01-21 DIAGNOSIS — M89.9 DISORDER OF BONE AND CARTILAGE: ICD-10-CM

## 2020-01-21 DIAGNOSIS — Z79.4 TYPE 2 DIABETES MELLITUS WITH HYPERGLYCEMIA, WITH LONG-TERM CURRENT USE OF INSULIN (H): Primary | ICD-10-CM

## 2020-01-21 DIAGNOSIS — E78.5 HYPERLIPIDEMIA LDL GOAL <100: ICD-10-CM

## 2020-01-21 DIAGNOSIS — M53.3 SACRAL BACK PAIN: ICD-10-CM

## 2020-01-21 DIAGNOSIS — M94.9 DISORDER OF BONE AND CARTILAGE: ICD-10-CM

## 2020-01-21 DIAGNOSIS — E11.65 TYPE 2 DIABETES MELLITUS WITH HYPERGLYCEMIA, WITH LONG-TERM CURRENT USE OF INSULIN (H): Primary | ICD-10-CM

## 2020-01-21 DIAGNOSIS — I10 ESSENTIAL HYPERTENSION WITH GOAL BLOOD PRESSURE LESS THAN 140/90: ICD-10-CM

## 2020-01-21 DIAGNOSIS — M35.02 SJOGREN'S SYNDROME WITH LUNG INVOLVEMENT (H): ICD-10-CM

## 2020-01-21 DIAGNOSIS — R39.15 URINARY URGENCY: ICD-10-CM

## 2020-01-21 DIAGNOSIS — K21.9 GASTROESOPHAGEAL REFLUX DISEASE WITHOUT ESOPHAGITIS: ICD-10-CM

## 2020-01-21 LAB
CHOLEST SERPL-MCNC: 115 MG/DL
CRYOGLOB SER QL: ABNORMAL %
HBA1C MFR BLD: 7 % (ref 0–5.6)
HDLC SERPL-MCNC: 91 MG/DL
LDLC SERPL CALC-MCNC: 1 MG/DL
NONHDLC SERPL-MCNC: 24 MG/DL
TRIGL SERPL-MCNC: 114 MG/DL

## 2020-01-21 PROCEDURE — 90750 HZV VACC RECOMBINANT IM: CPT | Performed by: FAMILY MEDICINE

## 2020-01-21 PROCEDURE — 99214 OFFICE O/P EST MOD 30 MIN: CPT | Mod: 25 | Performed by: FAMILY MEDICINE

## 2020-01-21 PROCEDURE — 80061 LIPID PANEL: CPT | Performed by: FAMILY MEDICINE

## 2020-01-21 PROCEDURE — 83036 HEMOGLOBIN GLYCOSYLATED A1C: CPT | Performed by: FAMILY MEDICINE

## 2020-01-21 PROCEDURE — 36415 COLL VENOUS BLD VENIPUNCTURE: CPT | Performed by: FAMILY MEDICINE

## 2020-01-21 PROCEDURE — 90471 IMMUNIZATION ADMIN: CPT | Performed by: FAMILY MEDICINE

## 2020-01-21 RX ORDER — LIDOCAINE 50 MG/G
PATCH TOPICAL
Qty: 30 PATCH | Refills: 5 | Status: SHIPPED | OUTPATIENT
Start: 2020-01-21 | End: 2021-02-15

## 2020-01-21 NOTE — NURSING NOTE
"Chief Complaint   Patient presents with     Chronic Cares     Diabetic and blood pressure  follow ups     /84 (BP Location: Right arm, Patient Position: Sitting, Cuff Size: Adult Large)   Pulse 53   Temp 97.7  F (36.5  C) (Oral)   Ht 1.676 m (5' 5.98\")   LMP  (Exact Date)   SpO2 97%   Breastfeeding No   BMI 33.75 kg/m   Estimated body mass index is 33.75 kg/m  as calculated from the following:    Height as of this encounter: 1.676 m (5' 5.98\").    Weight as of 1/15/20: 94.8 kg (209 lb).  bp completed using cuff size: large      Prior to immunization administration, verified patients identity using patient s name and date of birth. Please see Immunization Activity for additional information.     Screening Questionnaire for Adult Immunization    Are you sick today?   No   Do you have allergies to medications, food, a vaccine component or latex?   No   Have you ever had a serious reaction after receiving a vaccination?   No   Do you have a long-term health problem with heart, lung, kidney, or metabolic disease (e.g., diabetes), asthma, a blood disorder, no spleen, complement component deficiency, a cochlear implant, or a spinal fluid leak?  Are you on long-term aspirin therapy?   No   Do you have cancer, leukemia, HIV/AIDS, or any other immune system problem?   No   Do you have a parent, brother, or sister with an immune system problem?   No   In the past 3 months, have you taken medications that affect  your immune system, such as prednisone, other steroids, or anticancer drugs; drugs for the treatment of rheumatoid arthritis, Crohn s disease, or psoriasis; or have you had radiation treatments?   No   Have you had a seizure, or a brain or other nervous system problem?   No   During the past year, have you received a transfusion of blood or blood    products, or been given immune (gamma) globulin or antiviral drug?   No   For women: Are you pregnant or is there a chance you could become       pregnant " during the next month?   No   Have you received any vaccinations in the past 4 weeks?   No     Immunization questionnaire answers were all negative.        Per orders of Dr. blackman, injection of Shingrix given by Flores Blum CMA. Patient instructed to remain in clinic for 15 minutes afterwards, and to report any adverse reaction to me immediately.     Medicare ABN signed and sent to scanning       Screening performed by Flores Blmu CMA on 1/21/2020 at 11:27 AM.

## 2020-01-21 NOTE — PROGRESS NOTES
Subjective   Betty Tee is a 79 year old female who presents to clinic today for the following health issues:    HPI   Diabetes Follow-up  How often are you checking your blood sugar? Three times daily  Blood sugar testing frequency justification:  Patient modifying lifestyle changes (diet, exercise) with blood sugars  What time of day are you checking your blood sugars (select all that apply)?  Before meals, After meals and At bedtime  Have you had any blood sugars above 200?  Yes one or twice   Have you had any blood sugars below 70?  No    What symptoms do you notice when your blood sugar is low?  sweaty    What concerns do you have today about your diabetes? None     Do you have any of these symptoms? (Select all that apply)  Burning in feet    --DM/CKD-  She has been mostly off metformin since ~10/22/19, for sure since discussion about 10/22/19 labs on 11/11/19 (see lab result notes).  Initially off for GI sx's, and also concern for lowered GFR in 30-40s (though also GFR lower at time due to likely NSAID use).  GFR improved with stopping NSAIDS, with a slight improvement off metformin.  GI sx's are also better off the metformin- no issues now.  Basaglar- 22 units at breakfast (down from 25 in 9/19 per MTM).  Novolog- 6 units with meals  Ozemptic- 0.5mg once weekly.      --Shingles - needs the second shingrix today.      --Rheum/Sjogren's syndrome with ILD/bursitis-  She was having really bad hip pain.  Dr. Lopez gave her hip steroid injection for bursitis (higher prednisone dose prior didn't help)- that really helped perfectly on the left, somewhat helpful on the right side.  For the PMR, she also lowered the prednisone slowly, at 5mg daily since 1/15/20.  Pt is still avoiding NSAIDS for her kidney function issues.  Taking tylenol max dose 3 times daily- some days.    Reviewed some of the labs from 1/15/20 from rheum- cryoglob still pending.  Will discuss protein electrophoresis with Dr. Lopez.      John also referred her to endo to discuss bone density/dexa.  PT also scheduled for her hip and R leg pain.      --Pulmonary- finished pulmonary rehab in 2/19.  She is still getting SOB with longer walks.  Mostly off oxygen, though has it if needed (combination of pain and stairs).      --Urology f/u- incontinence/bladder loss- much better with dosing of lasix (higher dose in morning).      BP Readings from Last 2 Encounters:   01/21/20 132/84   01/15/20 128/76     Hemoglobin A1C (%)   Date Value   01/21/2020 7.0 (H)   10/22/2019 6.3 (H)     LDL Cholesterol Calculated (mg/dL)   Date Value   01/21/2020 1   04/09/2019 29         Hypertension Follow-up- on torsemide, toprol and spironolactone (through cardiology)    Do you check your blood pressure regularly outside of the clinic? No     Are you following a low salt diet? Yes    Are your blood pressures ever more than 140 on the top number (systolic) OR more   than 90 on the bottom number (diastolic), for example 140/90? No      How many servings of fruits and vegetables do you eat daily?  2-3    On average, how many sweetened beverages do you drink each day (Examples: soda, juice, sweet tea, etc.  Do NOT count diet or artificially sweetened beverages)?   0    How many days per week do you exercise enough to make your heart beat faster?      How many minutes a day do you exercise enough to make your heart beat faster? 9 or less    How many days per week do you miss taking your medication? 0        Patient Active Problem List   Diagnosis     Temporomandibular joint disorder     Diverticulitis of colon     Disorder of bone and cartilage     Osteoarthritis     Alcohol abuse, in remission     Restless legs syndrome (RLS)     Major depressive disorder, recurrent episode, moderate (H)     Essential hypertension with goal blood pressure less than 140/90     Advanced directives, counseling/discussion     Colouterine fistula     Permanent atrial fibrillation     Left  ventricular hypertrophy     Health Care Home     Insomnia     Joint pains     Allergic reaction caused by a drug - likely plaquenil     Breast fibroadenoma     DVT (deep venous thrombosis) (H)     Fatty liver disease, nonalcoholic     Rib pain     Neck mass     Gastroesophageal reflux disease without esophagitis     Enlarged lymph node     Sjogren's syndrome with lung involvement (H)     ANGELICA (obstructive sleep apnea)     ILD (interstitial lung disease) (H)     Lower GI bleed     (HFpEF) heart failure with preserved ejection fraction (H)     Type 2 diabetes mellitus with hyperglycemia, with long-term current use of insulin (H)     Heart failure, chronic, with acute decompensation (H)     Hyperlipidemia LDL goal <100     Long term current use of anticoagulant therapy     Inflammatory arthritis     Follicular bronchiolitis (H)     CKD (chronic kidney disease) stage 3, GFR 30-59 ml/min (H)     Closed head injury     Urge incontinence of urine     Urinary urgency     Past Surgical History:   Procedure Laterality Date     BACK SURGERY  1962     BIOPSY BREAST  9/27/02    Biopsy Left Breast     C APPENDECTOMY  1970's?     C NONSPECIFIC PROCEDURE  11/05    exploratory abd lap, adhesions, resolved RLQ pain, diverticulitis episodes     CARDIAC SURGERY       CHOLECYSTECTOMY  1990's?     COLECTOMY LEFT  11/7/2011    Procedure:COLECTOMY LEFT; Laparoscopic mobilization of splenic flexture, sigmoid colectomy, coloprotoscopy, loop illeostomy; Surgeon:CK CASTANEDA; Location:[facility]     HYSTERECTOMY TOTAL ABDOMINAL, BILATERAL SALPINGO-OOPHORECTOMY, COMBINED  11/7/2011    Procedure:COMBINED HYSTERECTOMY TOTAL ABDOMINAL, BILATERAL SALPINGO-OOPHORECTOMY; total abdominal hysterectomy, bilateral salpingo-oophorectomy; Surgeon:ALETA MANUEL; Location:[facility]     INSERT STENT URETER  11/7/2011    Procedure:INSERT STENT URETER; Placement of Bilateral Ureteral Stents ; Surgeon:PRANEETH BRYANT; Location:[facility]     SIGMOIDOSCOPY FLEXIBLE   11/3/2011    Procedure:SIGMOIDOSCOPY FLEXIBLE; Flexible Sigmoidoscopy; Surgeon:CK CASTANEDA; Location:[facility]     TAKEDOWN ILEOSTOMY  2012    Procedure:TAKEDOWN ILEOSTOMY; Takedown Loop Ileostomy ; Surgeon:CK CASTANEDA; Location:[facility]       Social History     Tobacco Use     Smoking status: Former Smoker     Packs/day: 0.50     Years: 10.00     Pack years: 5.00     Types: Cigarettes     Last attempt to quit: 2011     Years since quittin.5     Smokeless tobacco: Never Used     Tobacco comment:  ppd   Substance Use Topics     Alcohol use: No     Alcohol/week: 0.0 standard drinks     Comment: In recovery beginning            Current Outpatient Medications   Medication Sig Dispense Refill     acetaminophen (TYLENOL) 500 MG tablet Take 500 mg by mouth nightly as needed        acyclovir (ZOVIRAX) 400 MG tablet Take 1 tablet (400 mg) by mouth 3 times daily For a couple days 15 tablet 2     acyclovir (ZOVIRAX) 5 % external ointment Apply topically 6 times daily As needed for outbreaks 15 g 3     albuterol (PROAIR HFA, PROVENTIL HFA, VENTOLIN HFA) 108 (90 BASE) MCG/ACT inhaler Inhale 2 puffs into the lungs every 6 hours 1 Inhaler 3     azithromycin (ZITHROMAX) 250 MG tablet Take 1 tablet (250 mg) by mouth daily 30 tablet 11     BD JANE U/F 32G X 4 MM insulin pen needle USE 4 DAILY AS DIRECTED. 400 each 1     blood glucose (ACCU-CHEK SHNANON PLUS) test strip USE TO TEST BLOOD SUGARS 3 TIMES DAILY 400 strip 3     blood glucose monitoring (ACCU-CHEK FASTCLIX) lancets Use to test blood sugar 4 times daily or as directed.  Ok to substitute alternative if insurance prefers. 1 Box 11     blood glucose monitoring (ACCU-CHEK MULTICLIX) lancets Use to test blood sugar 4 times daily or as directed. 4 Box 3     cevimeline (EVOXAC) 30 MG capsule Take 1 capsule (30 mg) by mouth 3 times daily 270 capsule 0     COMPRESSION STOCKINGS Wear compression stockings in affected leg (right leg) or both legs most of the  time during the day and take them off at night. 2 each 2     escitalopram (LEXAPRO) 20 MG tablet TAKE 1 TABLET BY MOUTH EVERY DAY 90 tablet 1     fluticasone (FLONASE) 50 MCG/ACT nasal spray Spray 1-2 sprays into both nostrils daily as needed for allergies 18.2 mL 11     fluticasone-vilanterol (BREO ELLIPTA) 100-25 MCG/INH inhaler Inhale 1 puff into the lungs daily 1 Inhaler 11     Humidifier MISC Continuous humidified air via concentrator.   Diagnosis: ILD  Duration: Lifetime 99. 1 each 1     hydroxychloroquine (PLAQUENIL) 200 MG tablet Take 2 tablets (400 mg) by mouth daily Annual Plaquenil toxicity eye screening required. 180 tablet 1     insulin glargine (BASAGLAR KWIKPEN) 100 UNIT/ML pen INJECT 25 UNITS SUBCUTANEOUS DAILY (TO REPLACE LANTUS) 15 mL 1     ketoconazole (NIZORAL) 2 % external cream Apply topically 2 times daily 60 g 1     lidocaine (LIDODERM) 5 % patch Apply patch to painful area for up to 12 h within a 24 h period.  Remove after 12 hours. 30 patch 5     loratadine (CLARITIN) 10 MG tablet TAKE 1 TABLET BY MOUTH EVERY DAY 90 tablet 1     metoprolol succinate ER (TOPROL-XL) 25 MG 24 hr tablet Take 0.5 tablets (12.5 mg) by mouth 2 times daily Take 50 mg by mouth in combination with 12.5 for a total of 62.5 twice a day 90 tablet 3     metoprolol succinate ER (TOPROL-XL) 50 MG 24 hr tablet Take 50 mg by mouth in combination with 12.5 for a total of 62.5 twice a day 180 tablet 3     NOVOLOG FLEXPEN 100 UNIT/ML soln INJECT 12 UNITS SUBCUTANEOUS 3 TIMES DAILY (WITH MEALS) 15 mL 1     order for DME Please provide patient with a POC.  Okay to test patient on POC to maintain oxygen saturations above 90%.   Patient currently uses 2 to 3 LPM oxygen with activity. 1 Device 0     order for DME Equipment being ordered: Oxygen  Pulsed oxygen portable tank  Rate: 4LNC  Diagnosis: ILD  Duration: Lifetime -99 1 Device 1     order for DME Equipment being ordered: Full face mask for CPAP - size: F10 large 1 each 0      order for DME Oxygen: Patient requires supplemental Oxygen 4 LPM via nasal canula with activity. Please provide patient with a home unit and with portability capability. Oxygen will be for a lifetime. 1 Device 0     potassium chloride ER (KLOR-CON) 20 MEQ CR tablet Take 2 tablets (40 mEq) by mouth every morning AND 1 tablet (20 mEq) every evening. 270 tablet 3     predniSONE (DELTASONE) 5 MG tablet Take 1 tablet (5 mg) by mouth daily 90 tablet 1     rivaroxaban ANTICOAGULANT (XARELTO) 20 MG TABS tablet Take 1 tablet (20 mg) by mouth daily (with dinner) 90 tablet 3     Semaglutide,0.25 or 0.5MG/DOS, (OZEMPIC, 0.25 OR 0.5 MG/DOSE,) 2 MG/1.5ML SOPN Inject 0.5 mg Subcutaneous once a week 4.5 mL 1     spironolactone (ALDACTONE) 25 MG tablet Take 2 tablets (50 mg) by mouth daily 180 tablet 3     tiZANidine (ZANAFLEX) 2 MG tablet Take 1-2 tablets (2-4 mg) by mouth 3 times daily 90 tablet 1     torsemide (DEMADEX) 10 MG tablet Take one tab (10 mg) with one 20 mg tab to = 30 mg daily in afternoon. 90 tablet 3     torsemide (DEMADEX) 20 MG tablet Take 2 tabs (40 mg) in am daily 105 tablet 10     traZODone (DESYREL) 50 MG tablet TAKE 0.5-1.5 TABLETS (25-75 MG) BY MOUTH NIGHTLY AS NEEDED FOR SLEEP 135 tablet 0     vitamin D3 (CHOLECALCIFEROL) 2000 units (50 mcg) tablet Take 1 tablet (2,000 Units) by mouth daily 90 tablet 3     atorvastatin (LIPITOR) 10 MG tablet Take 0.5 tablets (5 mg) by mouth daily 45 tablet 0     montelukast (SINGULAIR) 10 MG tablet TAKE 1 TABLET BY MOUTH EVERYDAY AT BEDTIME 90 tablet 0     tiZANidine (ZANAFLEX) 4 MG tablet Take 1 tablet (4 mg) by mouth nightly as needed for muscle spasms 18 tablet 0     Allergies   Allergen Reactions     Amoxicillin-Pot Clavulanate      Augmentin Nausea and Vomiting     Codeine Nausea and Vomiting     Codeine      PN: LW Reaction: HIVES     Penicillins Nausea and Vomiting     PN: LW Reaction: GI Upset     Phenobarbital Itching     Phenobarbital      Seasonal Allergies   "    Recent Labs   Lab Test 01/21/20  1116 01/15/20  1805 12/03/19  1049 10/22/19  1416  08/16/19  1334  04/09/19  1017  05/23/18  1028   A1C 7.0*  --   --  6.3*  --   --   --  7.2*   < > 6.9*   LDL 1  --   --   --   --   --   --  29  --  21   HDL 91  --   --   --   --   --   --  59  --  53   TRIG 114  --   --   --   --   --   --  148  --  113   ALT  --  21  --   --   --  16  --   --   --  21   CR  --  1.01 0.97 1.10*   < > 1.05*   < > 0.85   < > 0.90   GFRESTIMATED  --  53* 55* 48*   < > 50*   < > 65   < > 61   GFRESTBLACK  --  61 64 55*   < > 58*   < > 76   < > 74   POTASSIUM  --   --  3.7 4.1   < >  --    < > 3.9   < > 3.5   TSH  --   --   --   --   --   --   --  2.72  --  2.42    < > = values in this interval not displayed.      BP Readings from Last 3 Encounters:   01/21/20 132/84   01/15/20 128/76   12/04/19 (!) 150/64    Wt Readings from Last 3 Encounters:   02/04/20 90.3 kg (199 lb)   01/15/20 94.8 kg (209 lb)   12/04/19 91.2 kg (201 lb)              Reviewed and updated as needed this visit by Provider  Tobacco  Allergies  Meds  Problems  Med Hx  Surg Hx  Fam Hx         Review of Systems   ROS COMP: Constitutional, HEENT, cardiovascular, pulmonary, gi and gu systems are negative, except as otherwise noted.      Objective    /84 (BP Location: Right arm, Patient Position: Sitting, Cuff Size: Adult Large)   Pulse 53   Temp 97.7  F (36.5  C) (Oral)   Ht 1.676 m (5' 5.98\")   LMP  (Exact Date)   SpO2 97%   Breastfeeding No   BMI 33.75 kg/m    Body mass index is 33.75 kg/m .  Physical Exam   GENERAL APPEARANCE: healthy, alert and no distress     EYES: PERRL, sclera clear     HENT: nose and mouth without ulcers or lesions     NECK: no adenopathy, no asymmetry, masses, or scars and thyroid normal to palpation     RESP: lungs clear to auscultation - no rales, rhonchi or wheezes     CV: regular rates and rhythm, normal S1 S2, no S3 or S4 and no murmur, click or rub      Abdomen: soft, nontender, no " HSM or masses and bowel sounds normal     Psych: full range affect, normal speech and grooming, judgement and insight intact     Diagnostic Test Results:  Labs reviewed in Epic  Results for orders placed or performed in visit on 01/21/20   Lipid panel reflex to direct LDL Fasting     Status: None   Result Value Ref Range    Cholesterol 115 <200 mg/dL    Triglycerides 114 <150 mg/dL    HDL Cholesterol 91 >49 mg/dL    LDL Cholesterol Calculated 1 <100 mg/dL    Non HDL Cholesterol 24 <130 mg/dL   Hemoglobin A1c     Status: Abnormal   Result Value Ref Range    Hemoglobin A1C 7.0 (H) 0 - 5.6 %           Assessment & Plan       ICD-10-CM    1. Type 2 diabetes mellitus with hyperglycemia, with long-term current use of insulin (H) E11.65 Hemoglobin A1c    Z79.4    2. CKD (chronic kidney disease) stage 3, GFR 30-59 ml/min (H) N18.3    3. Essential hypertension with goal blood pressure less than 140/90 I10    4. Disorder of bone and cartilage M89.9     M94.9    5. Sjogren's syndrome with lung involvement (H) M35.02    6. Gastroesophageal reflux disease without esophagitis K21.9    7. Urinary urgency R39.15    8. Hyperlipidemia LDL goal <100 E78.5 Lipid panel reflex to direct LDL Fasting   9. Need for shingles vaccine Z23 ZOSTER VACCINE RECOMBINANT ADJUVANTED IM NJX   10. Sacral back pain M53.3 lidocaine (LIDODERM) 5 % patch     DM II- still in good control, but A1C up from 6.3 to 7.0 today.   Did go off metformin in ~10-11/19 due to GI upset and lower GFR (this improved after stopping NSAIDS, and going off metformin).  Okay to continue off metformin.    HTN/CKD- stable in mid 50s now off NSAIDS and metformin.  On torsemide, toprol and spironolactone- through cardiology.  Will cont to monitor closely.    Rheum- reviewed Dr. Lopez's notes and plan-   Steroid shots helped hip pain.  Referred to PT for other pains.  Referred to endo for bone density concerns.    Lipids- fasting today, so will recheck lipids.  On lipitor  10mg/d.    Refilled lidocaine patches for back pain- finding them helpful.    Shingrix- Second dose needed. Risks/benefits discussed, given today.       Return in about 3 months (around 4/21/2020) for Routine Visit/Chronic Cares/Med Check, Diabetes, [facility].    Ilene Tristan MD  [facility]

## 2020-01-28 ENCOUNTER — THERAPY VISIT (OUTPATIENT)
Dept: PHYSICAL THERAPY | Facility: CLINIC | Age: 80
End: 2020-01-28
Payer: MEDICARE

## 2020-01-28 ENCOUNTER — MEDICAL CORRESPONDENCE (OUTPATIENT)
Dept: HEALTH INFORMATION MANAGEMENT | Facility: CLINIC | Age: 80
End: 2020-01-28

## 2020-01-28 DIAGNOSIS — M70.72 BURSITIS OF LEFT HIP, UNSPECIFIED BURSA: ICD-10-CM

## 2020-01-28 DIAGNOSIS — M70.71 BURSITIS OF RIGHT HIP, UNSPECIFIED BURSA: ICD-10-CM

## 2020-01-28 PROCEDURE — 97110 THERAPEUTIC EXERCISES: CPT | Mod: GP | Performed by: PHYSICAL THERAPIST

## 2020-01-28 PROCEDURE — 97161 PT EVAL LOW COMPLEX 20 MIN: CPT | Mod: GP | Performed by: PHYSICAL THERAPIST

## 2020-01-28 ASSESSMENT — ACTIVITIES OF DAILY LIVING (ADL)
DEEP_SQUATTING: UNABLE TO DO
HOS_ADL_ITEM_SCORE_TOTAL: 18
GOING_DOWN_1_FLIGHT_OF_STAIRS: MODERATE DIFFICULTY
WALKING_INITIALLY: EXTREME DIFFICULTY
WALKING_UP_STEEP_HILLS: EXTREME DIFFICULTY
WALKING_DOWN_STEEP_HILLS: MODERATE DIFFICULTY
PUTTING_ON_SOCKS_AND_SHOES: EXTREME DIFFICULTY
GETTING_INTO_AND_OUT_OF_A_BATHTUB: UNABLE TO DO
HOS_ADL_SCORE(%): 32.14
SITTING_FOR_15_MINUTES: MODERATE DIFFICULTY
WALKING_APPROXIMATELY_10_MINUTES: MODERATE DIFFICULTY
WALKING_15_MINUTES_OR_GREATER: MODERATE DIFFICULTY
HOW_WOULD_YOU_RATE_YOUR_CURRENT_LEVEL_OF_FUNCTION_DURING_YOUR_USUAL_ACTIVITIES_OF_DAILY_LIVING_FROM_0_TO_100_WITH_100_BEING_YOUR_LEVEL_OF_FUNCTION_PRIOR_TO_YOUR_HIP_PROBLEM_AND_0_BEING_THE_INABILITY_TO_PERFORM_ANY_OF_YOUR_USUAL_DAILY_ACTIVITIES?: 84
TWISTING/PIVOTING_ON_INVOLVED_LEG: EXTREME DIFFICULTY
STEPPING_UP_AND_DOWN_CURBS: EXTREME DIFFICULTY
ROLLING_OVER_IN_BED: EXTREME DIFFICULTY
GOING_UP_1_FLIGHT_OF_STAIRS: MODERATE DIFFICULTY
STANDING_FOR_15_MINUTES: MODERATE DIFFICULTY
GETTING_INTO_AND_OUT_OF_AN_AVERAGE_CAR: EXTREME DIFFICULTY
HOS_ADL_HIGHEST_POTENTIAL_SCORE: 56
HOS_ADL_COUNT: 14

## 2020-01-28 NOTE — PROGRESS NOTES
[facility] Initial Evaluation  Subjective:  [facility] - [facility]  Physical Therapy Initial Examination/Evaluation  January 28, 2020    Betty Tee is a 79 year old  female referred to physical therapy by Dr. Webster for treatment of hip bursitis with Precautions/Restrictions/MD instructions none    Subjective:  Referring MD visit date: 1/15/20  DOI/onset: one month ago  Mechanism of injury: Insidious onset of pain  DOS none  Previous treatment: Injection - helpful on L, not as helpful on R  Imaging: none  Chief Complaint:   Pain with walking, lying on side   Pain: best 4 /10, worst 5/10 lateral hip; numbness in foot, Described as: aching, sharp Alleviated by: rest Frequency: constant Progression of symptoms since initial onset: staying the same Time of day when pain is worse: not related  Sleeping: affected  Social history:  none    Occupation: [employer]  Job duties: on multiple boards    Current HEP/exercise regimen: golf, fishing  Patient's goals are reduce pain    Pertinent PMH: see Epic   General Health Reported by Patient: good  Return to MD:  PRN       Objective:    Gait:    Gait Type:  Antalgic   Assistive Devices:  Walker                 Lumbar/SI Evaluation  ROM:      Strength: Poor core stability  Lumbar Myotomes:    T12-L3 (Hip Flex):  Right: 4-  L2-4 (Quads):  Right:  4-  L4 (Ankle DF):  Right:  5-  L5 (Great Toe Ext): Right: 5-   S1 (Toe Raise):  Right: 5-        Neural Tension/Mobility:      Right side:   Slump positive.  Right side:   SLR  negative.   Lumbar Palpation:  Palpation (lumbar): IT band on R.    Tenderness present at Right: Gluteus Medius  Functional Tests:  Core strength and proprioception lumbar: Pain and difficulty with supine<->sit, sit<-> stand.                                              Hip Evaluation  Hip PROM:    Flexion: Left: 110  Right:    Abduction: Left: 40    Right:  40    Internal Rotation: Left: 20    Right: 20  External Rotation: Left: 45    Right: 45                               General     ROS    Assessment/Plan:    Patient is a 79 year old female with right side hip complaints.    Patient has the following significant findings with corresponding treatment plan.                Diagnosis 1:  Hip pain versus lumbar radiculopathy    Pain -  hot/cold therapy, US, electric stimulation, manual therapy, splint/taping/bracing/orthotics, self management, education, directional preference exercise and home program  Decreased ROM/flexibility - manual therapy and therapeutic exercise  Decreased joint mobility - manual therapy and therapeutic exercise  Decreased strength - therapeutic exercise and therapeutic activities  Impaired balance - neuro re-education and therapeutic activities  Decreased proprioception - neuro re-education and therapeutic activities  Impaired gait - gait training  Impaired muscle performance - neuro re-education  Decreased function - therapeutic activities    Therapy Evaluation Codes:   1) History comprised of:   Personal factors that impact the plan of care:      None.    Comorbidity factors that impact the plan of care are:      None.     Medications impacting care: None.  2) Examination of Body Systems comprised of:   Body structures and functions that impact the plan of care:      Hip.   Activity limitations that impact the plan of care are:      Sitting, Standing, Walking and Sleeping.  3) Clinical presentation characteristics are:   Stable/Uncomplicated.  4) Decision-Making    Low complexity using standardized patient assessment instrument and/or measureable assessment of functional outcome.  Cumulative Therapy Evaluation is: Low complexity.    Previous and current functional limitations:  (See Goal Flow Sheet for this information)    Short term and Long term goals: (See Goal Flow Sheet for this information)     Communication ability:  Patient appears to  be able to clearly communicate and understand verbal and written communication and follow directions correctly.  Treatment Explanation - The following has been discussed with the patient:   RX ordered/plan of care  Anticipated outcomes  Possible risks and side effects  This patient would benefit from PT intervention to resume normal activities.   Rehab potential is good.    Frequency:  1 X week, once daily  Duration:  for 8 weeks  Discharge Plan:  Achieve all LTG.  Independent in home treatment program.  Reach maximal therapeutic benefit.    Please refer to the daily flowsheet for treatment today, total treatment time and time spent performing 1:1 timed codes.

## 2020-01-28 NOTE — LETTER
DEPARTMENT OF HEALTH AND HUMAN SERVICES  CENTERS FOR MEDICARE & MEDICAID SERVICES    PLAN/UPDATED PLAN OF PROGRESS FOR OUTPATIENT REHABILITATION    PATIENTS NAME:  Betty Tee   : 1940  PROVIDER NUMBER:    [id number]  HICN: [id number]  PROVIDER NAME: [facility] PHYSICAL THERAPY [facility]  MEDICAL RECORD NUMBER: [id number]   START OF CARE DATE:  SOC Date: 20   TYPE:  PT  PRIMARY/TREATMENT DIAGNOSIS: (Pertinent Medical Diagnosis)  Bursitis of left hip, unspecified bursa  Bursitis of right hip, unspecified bursa  VISITS FROM START OF CARE:  Rxs Used: 1                   [facility]  Physical Therapy Initial Examination/Evaluation  2020    Betty Tee is a 79 year old  female referred to physical therapy by Dr. Webster for treatment of hip bursitis with Precautions/Restrictions/MD instructions none    Subjective:  Referring MD visit date: 1/15/20  DOI/onset: one month ago  Mechanism of injury: Insidious onset of pain  DOS none  Previous treatment: Injection - helpful on L, not as helpful on R  Imaging: none  Chief Complaint:   Pain with walking, lying on side   Pain: best 4 /10, worst 5/10 lateral hip; numbness in foot, Described as: aching, sharp Alleviated by: rest Frequency: constant Progression of symptoms since initial onset: staying the same Time of day when pain is worse: not related  Sleeping: affected  Social history:  none    Occupation: [employer]  Job duties: on multiple boards    Current HEP/exercise regimen: golf, fishing  Patient's goals are reduce pain  Pertinent PMH: see Epic   General Health Reported by Patient: good  Return to MD:  PRN     Objective:  Gait:    Gait Type:  Antalgic   Assistive Devices:  Walker  Lumbar/SI Evaluation  ROM:    Strength: Poor core stability  Lumbar Myotomes:    T12-L3 (Hip Flex):  Right: 4-  L2-4 (Quads):  Right:  4-  L4 (Ankle DF):  Right:  5-  L5 (Great Toe Ext): Right: 5-    S1 (Toe Raise):  Right: 5-  Neural Tension/Mobility:    Right side:   Slump positive.  Right side:   SLR  negative.   PATIENTS NAME:  Betty Tee   : 1940    Lumbar Palpation:  Palpation (lumbar): IT band on R.  Tenderness present at Right: Gluteus Medius  Functional Tests:  Core strength and proprioception lumbar: Pain and difficulty with supine<->sit, sit<-> stand.    Hip Evaluation  Hip PROM:    Flexion: Left: 110  Right:  Abduction: Left: 40    Right: 40  Internal Rotation: Left: 20    Right: 20  External Rotation: Left: 45    Right: 45    Assessment/Plan:    Patient is a 79 year old female with right side hip complaints.    Patient has the following significant findings with corresponding treatment plan.                Diagnosis 1:  Hip pain versus lumbar radiculopathy    Pain -  hot/cold therapy, US, electric stimulation, manual therapy, splint/taping/bracing/orthotics, self management, education, directional preference exercise and home program  Decreased ROM/flexibility - manual therapy and therapeutic exercise  Decreased joint mobility - manual therapy and therapeutic exercise  Decreased strength - therapeutic exercise and therapeutic activities  Impaired balance - neuro re-education and therapeutic activities  Decreased proprioception - neuro re-education and therapeutic activities  Impaired gait - gait training  Impaired muscle performance - neuro re-education  Decreased function - therapeutic activities  Therapy Evaluation Codes:   1) History comprised of:   Personal factors that impact the plan of care:      None.    Comorbidity factors that impact the plan of care are:      None.     Medications impacting care: None.  2) Examination of Body Systems comprised of:   Body structures and functions that impact the plan of care:      Hip.   Activity limitations that impact the plan of care are:      Sitting, Standing, Walking and Sleeping.  3) Clinical presentation characteristics  "are:   Stable/Uncomplicated.  4) Decision-Making    Low complexity using standardized patient assessment instrument and/or measureable assessment of functional outcome.  Cumulative Therapy Evaluation is: Low complexity.  Previous and current functional limitations:  (See Goal Flow Sheet for this information)    Short term and Long term goals: (See Goal Flow Sheet for this information)   Communication ability:  Patient appears to be able to clearly communicate and understand verbal and written communication and follow directions correctly.  Treatment Explanation - The following has been discussed with the patient:   RX ordered/plan of care  Anticipated outcomes  Possible risks and side effects  This patient would benefit from PT intervention to resume normal activities.   Rehab potential is good.  Frequency:  1 X week, once daily  Duration:  for 8 weeks  PATIENTS NAME:  Betty Tee   : 1940    Discharge Plan:  Achieve all LTG.  Independent in home treatment program.  Reach maximal therapeutic benefit.  Please refer to the daily flowsheet for treatment today, total treatment time and time spent performing 1:1 timed codes.     Caregiver Signature/Credentials _____________________________ Date ________       Treating Provider: Juana Sigala, BAMBI, [facility]   I have reviewed and certified the need for these services and plan of treatment while under my care.        PHYSICIAN'S SIGNATURE:   ____________________________________  Date___________      Zulma Lopez MD  Certification period:  Beginning of Cert date period: 20 to  End of Cert period date: 20     Functional Level Progress Report: Please see attached \"Goal Flow sheet for Functional level.\"    ____X____ Continue Services or       ________ DC Services                Service dates: From  SOC Date: 20 date to present                         "

## 2020-01-30 ENCOUNTER — TELEPHONE (OUTPATIENT)
Dept: FAMILY MEDICINE | Facility: CLINIC | Age: 80
End: 2020-01-30

## 2020-01-30 ENCOUNTER — TELEPHONE (OUTPATIENT)
Dept: RHEUMATOLOGY | Facility: CLINIC | Age: 80
End: 2020-01-30

## 2020-01-30 DIAGNOSIS — E78.5 HYPERLIPIDEMIA LDL GOAL <100: ICD-10-CM

## 2020-01-30 NOTE — TELEPHONE ENCOUNTER
Reason for call:  Patient reporting a symptom    Symptom or request: Pain on the right side     Duration (how long have symptoms been present): About 6 weeks     Have you been treated for this before? Yes    Additional comments: Pt states that physical therapist advised her to ask provider for a muscle relaxant. Pt would also like to speak with nurse     Phone Number patient can be reached at:  Home number on file [phone] (home)    Best Time:  Anytime     Can we leave a detailed message on this number:  YES    Call taken on 1/30/2020 at 3:33 PM by Audrey Fletcher

## 2020-01-30 NOTE — TELEPHONE ENCOUNTER
Pt continues to have problems with right leg.  She is having burning down the leg, took 2 tylenol due to pain.  Tried doing exercises prescribed per PT but they only help when she is doing them, and when she stops it is worse than before.     Tried heating pad, exercises, prayer and fasting, but is not having any luck, it is not getting better.  PT recommended that a muscles relaxant as it could be spasms.  She reports that the bursitis shot helped the left leg, but the right leg is just giving her trouble.  She is very uncomfortable and does not know what to do.    Offered to send to on-call, but she would prefer to wait to see what Dr. Lopez recommends, she is also going to try PCP to see what she recommends.    Kamille Ruffin RN  [facility]

## 2020-01-30 NOTE — TELEPHONE ENCOUNTER
"CW  See below message  Pt says that her right hip is still very painful. Burning pain that radiates from hip to lower back, down leg to foot. \"I know something is pinched\" Sometimes foot will go numb.    Says OTC meds do not help.    Would like to get a muscle relaxer and an Xray, can come in for the xray next week, the next time she has PT.    Thank you,  Kerline Leo RN      "

## 2020-01-31 NOTE — TELEPHONE ENCOUNTER
She needs to be seen, ideally by sports medicine, or could go to the [facility] walk-in clinic (which I think she's done before).  An xray/flexeril rx just sent in would not be good for this issue.  Thanks,  CW

## 2020-01-31 NOTE — TELEPHONE ENCOUNTER
"CW,   FYI:  Patient informed    States she is not going to go out to [facility]   \"I'm not going to be going to [place] for this\"    States she was doing what was recommended by PT in asking for Flexeril from her PCP and that you see her enough you should be comfortable ordering what she needs     Pt states she pretty upset by this  Will continue taking Tylenol and see if she \"can come through this on her own.\"  States \"tell Dr Tristan she's not my favorite doctor today.\"    Lydia HALEY RN      "

## 2020-02-01 DIAGNOSIS — J84.9 ILD (INTERSTITIAL LUNG DISEASE) (H): ICD-10-CM

## 2020-02-01 DIAGNOSIS — M35.02 SJOGREN'S SYNDROME WITH LUNG INVOLVEMENT (H): ICD-10-CM

## 2020-02-03 RX ORDER — MONTELUKAST SODIUM 10 MG/1
TABLET ORAL
Qty: 90 TABLET | Refills: 0 | Status: SHIPPED | OUTPATIENT
Start: 2020-02-03 | End: 2020-04-27

## 2020-02-03 NOTE — TELEPHONE ENCOUNTER
"Prescription approved per [facility] according to the [facility] Refill Protocol.  Lydia HALEY RN    Last Written Prescription Date:  10/22/2019  Last Fill Quantity: 90,  # refills: 0   Last office visit: 1/21/2020 with prescribing provider:     Future Office Visit:   Next 5 appointments (look out 90 days)    Apr 15, 2020 11:00 AM CDT  Office Visit with Ilene Tristan MD  [facility] [address] [address] [phone]         Requested Prescriptions   Pending Prescriptions Disp Refills     montelukast (SINGULAIR) 10 MG tablet [Pharmacy Med Name: MONTELUKAST SOD 10 MG TABLET] 90 tablet 0     Sig: TAKE 1 TABLET BY MOUTH EVERYDAY AT BEDTIME       Leukotriene Inhibitors Protocol Passed - 2/1/2020  3:46 PM        Passed - Patient is age 12 or older     If patient is under 16, ok to refill using age based dosing.           Passed - Recent (12 mo) or future (30 days) visit within the authorizing provider's specialty     Patient has had an office visit with the authorizing provider or a provider within the authorizing providers department within the previous 12 mos or has a future within next 30 days. See \"Patient Info\" tab in inbasket, or \"Choose Columns\" in Meds & Orders section of the refill encounter.              Passed - Medication is active on med list        "

## 2020-02-04 ENCOUNTER — THERAPY VISIT (OUTPATIENT)
Dept: PHYSICAL THERAPY | Facility: CLINIC | Age: 80
End: 2020-02-04
Payer: MEDICARE

## 2020-02-04 ENCOUNTER — OFFICE VISIT (OUTPATIENT)
Dept: ORTHOPEDICS | Facility: CLINIC | Age: 80
End: 2020-02-04
Payer: MEDICARE

## 2020-02-04 VITALS — HEIGHT: 66 IN | BODY MASS INDEX: 31.98 KG/M2 | WEIGHT: 199 LBS

## 2020-02-04 DIAGNOSIS — M54.41 BILATERAL LOW BACK PAIN WITH RIGHT-SIDED SCIATICA: Primary | ICD-10-CM

## 2020-02-04 DIAGNOSIS — M54.16 LUMBAR BACK PAIN WITH RADICULOPATHY AFFECTING RIGHT LOWER EXTREMITY: Primary | ICD-10-CM

## 2020-02-04 PROCEDURE — 97140 MANUAL THERAPY 1/> REGIONS: CPT | Mod: GP | Performed by: PHYSICAL THERAPIST

## 2020-02-04 PROCEDURE — 97110 THERAPEUTIC EXERCISES: CPT | Mod: GP | Performed by: PHYSICAL THERAPIST

## 2020-02-04 ASSESSMENT — MIFFLIN-ST. JEOR: SCORE: 1394.41

## 2020-02-04 NOTE — PROGRESS NOTES
"      SPORTS & ORTHOPEDIC WALK-IN VISIT 2/4/2020    Primary Care Physician: Dr. Tristan    Had history of Bilateral hip inflammation which was treated with a CSI which had helped the L greater trochanter area but not the R side.    Mentions pain that stems from the R glute area that travels laterally down her leg and into her ankle. Notes some instability when transitioning from sitting to standing, feels like her R leg will \"give out.\" Uses a walker for added stability (mentions she does not normally use this).     Most of the suggested HEP is non-painful.    Reason for visit:     What part of your body is injured / painful?  right low back    What caused the injury /pain? Unsure    How long ago did your injury occur or pain begin? about a month ago    What are your most bothersome symptoms? Pain and Other: burning radicular Sx    How would you characterize your symptom?  burning    What makes your symptoms better? Rest - stretching    What makes your symptoms worse? Other: abduction, transitioning from sitting to standing, nights are especially painful    Have you been previously seen for this problem? Yes, Rheumatology, PT    Medical History:    Any recent changes to your medical history? No    Any new medication prescribed since last visit? No    Have you had surgery on this body part before? Yes 1961 - L5 Discectomy    Social History:    Occupation: retired    Handedness: Right    Exercise: None    Review of Systems:    Do you have fever, chills, weight loss? No    Do you have any vision problems? No    Do you have any chest pain or edema? Congestive heart failure, AFIB,    Do you have any shortness of breath or wheezing?  Yes, Occasional    Do you have stomach problems? No    Do you have any numbness or focal weakness? Yes, R leg weakness    Do you have diabetes? Yes, Type 2    Do you have problems with bleeding or clotting? No    Do you have an rashes or other skin lesions? No         "

## 2020-02-04 NOTE — PROGRESS NOTES
[facility]  Orthopedics  William El, DO  2020     Name: Betty Tee  MRN: [id number]  Age: 79 year old  : 1940  Referring provider: Referred Self     Chief Complaint: Pain of the Right Leg    History of Present Illness:   Sister Betty Tee is a 79 year old female, with a history of bilateral hip inflammation, who presents today for evaluation of right low back pain sustained one month ago without any inciting event or injury. She endorses burning pain in her right glute that travels laterally down her leg and into her ankle. She also notes instability when transitioning from sitting to standing position. She also reports her knees buckling. Pain is exacerbated with abduction, transitioning positions, and at night. Pain is alleviated with stretching.     She ambulates with a walker and notes that her home exercise program is not painful. Of note, she had a corticosteroid injection for the bursitis in her hip two weeks ago, and that injection only relieved left greater trochanter pain but not the right side. No relief of shooting pain down right leg.      She was in PT today with Maegan Sigala and had some concerns about her overall condition and referred her on to see us today.    Review of Systems:   A 10-point review of systems was obtained and is negative except for as noted in the HPI.     Medications:      acetaminophen (TYLENOL) 500 MG tablet, Take 500 mg by mouth nightly as needed , Disp: , Rfl:      acyclovir (ZOVIRAX) 400 MG tablet, Take 1 tablet (400 mg) by mouth 3 times daily For a couple days, Disp: 15 tablet, Rfl: 2     acyclovir (ZOVIRAX) 5 % external ointment, Apply topically 6 times daily As needed for outbreaks, Disp: 15 g, Rfl: 3     albuterol (PROAIR HFA, PROVENTIL HFA, VENTOLIN HFA) 108 (90 BASE) MCG/ACT inhaler, Inhale 2 puffs into the lungs every 6 hours, Disp: 1 Inhaler, Rfl: 3     atorvastatin (LIPITOR) 10 MG tablet, TAKE 1 TABLET BY MOUTH EVERY DAY, Disp: 90 tablet,  Rfl: 1     azithromycin (ZITHROMAX) 250 MG tablet, Take 1 tablet (250 mg) by mouth daily, Disp: 30 tablet, Rfl: 11     BD JANE U/F 32G X 4 MM insulin pen needle, USE 4 DAILY AS DIRECTED., Disp: 400 each, Rfl: 1     blood glucose (ACCU-CHEK SHANNON PLUS) test strip, USE TO TEST BLOOD SUGARS 3 TIMES DAILY, Disp: 400 strip, Rfl: 3     blood glucose monitoring (ACCU-CHEK FASTCLIX) lancets, Use to test blood sugar 4 times daily or as directed.  Ok to substitute alternative if insurance prefers., Disp: 1 Box, Rfl: 11     blood glucose monitoring (ACCU-CHEK MULTICLIX) lancets, Use to test blood sugar 4 times daily or as directed., Disp: 4 Box, Rfl: 3     cevimeline (EVOXAC) 30 MG capsule, Take 1 capsule (30 mg) by mouth 3 times daily, Disp: 270 capsule, Rfl: 0     COMPRESSION STOCKINGS, Wear compression stockings in affected leg (right leg) or both legs most of the time during the day and take them off at night., Disp: 2 each, Rfl: 2     escitalopram (LEXAPRO) 20 MG tablet, TAKE 1 TABLET BY MOUTH EVERY DAY, Disp: 90 tablet, Rfl: 1     fluticasone (FLONASE) 50 MCG/ACT nasal spray, Spray 1-2 sprays into both nostrils daily as needed for allergies, Disp: 18.2 mL, Rfl: 11     fluticasone-vilanterol (BREO ELLIPTA) 100-25 MCG/INH inhaler, Inhale 1 puff into the lungs daily, Disp: 1 Inhaler, Rfl: 11     Humidifier MISC, Continuous humidified air via concentrator.  Diagnosis: ILD Duration: Lifetime 99., Disp: 1 each, Rfl: 1     hydroxychloroquine (PLAQUENIL) 200 MG tablet, Take 2 tablets (400 mg) by mouth daily Annual Plaquenil toxicity eye screening required., Disp: 180 tablet, Rfl: 1     insulin glargine (BASAGLAR KWIKPEN) 100 UNIT/ML pen, INJECT 25 UNITS SUBCUTANEOUS DAILY (TO REPLACE LANTUS), Disp: 15 mL, Rfl: 1     ketoconazole (NIZORAL) 2 % external cream, Apply topically 2 times daily, Disp: 60 g, Rfl: 1     lidocaine (LIDODERM) 5 % patch, Apply patch to painful area for up to 12 h within a 24 h period.  Remove after 12  hours., Disp: 30 patch, Rfl: 5     loratadine (CLARITIN) 10 MG tablet, TAKE 1 TABLET BY MOUTH EVERY DAY, Disp: 90 tablet, Rfl: 1     metoprolol succinate ER (TOPROL-XL) 25 MG 24 hr tablet, Take 0.5 tablets (12.5 mg) by mouth 2 times daily Take 50 mg by mouth in combination with 12.5 for a total of 62.5 twice a day, Disp: 90 tablet, Rfl: 3     metoprolol succinate ER (TOPROL-XL) 50 MG 24 hr tablet, Take 50 mg by mouth in combination with 12.5 for a total of 62.5 twice a day, Disp: 180 tablet, Rfl: 3     montelukast (SINGULAIR) 10 MG tablet, TAKE 1 TABLET BY MOUTH EVERYDAY AT BEDTIME, Disp: 90 tablet, Rfl: 0     NOVOLOG FLEXPEN 100 UNIT/ML soln, INJECT 12 UNITS SUBCUTANEOUS 3 TIMES DAILY (WITH MEALS), Disp: 15 mL, Rfl: 1     order for DME, Please provide patient with a POC.  Okay to test patient on POC to maintain oxygen saturations above 90%.  Patient currently uses 2 to 3 LPM oxygen with activity., Disp: 1 Device, Rfl: 0     order for DME, Equipment being ordered: Oxygen Pulsed oxygen portable tank Rate: 4LNC Diagnosis: ILD Duration: Lifetime -99, Disp: 1 Device, Rfl: 1     order for DME, Equipment being ordered: Full face mask for CPAP - size: F10 large, Disp: 1 each, Rfl: 0     order for DME, Oxygen: Patient requires supplemental Oxygen 4 LPM via nasal canula with activity. Please provide patient with a home unit and with portability capability. Oxygen will be for a lifetime., Disp: 1 Device, Rfl: 0     potassium chloride ER (KLOR-CON) 20 MEQ CR tablet, Take 2 tablets (40 mEq) by mouth every morning AND 1 tablet (20 mEq) every evening., Disp: 270 tablet, Rfl: 3     predniSONE (DELTASONE) 5 MG tablet, Take 1 tablet (5 mg) by mouth daily, Disp: 90 tablet, Rfl: 1     rivaroxaban ANTICOAGULANT (XARELTO) 20 MG TABS tablet, Take 1 tablet (20 mg) by mouth daily (with dinner), Disp: 90 tablet, Rfl: 3     Semaglutide,0.25 or 0.5MG/DOS, (OZEMPIC, 0.25 OR 0.5 MG/DOSE,) 2 MG/1.5ML SOPN, Inject 0.5 mg Subcutaneous once a week,  "Disp: 4.5 mL, Rfl: 1     spironolactone (ALDACTONE) 25 MG tablet, Take 2 tablets (50 mg) by mouth daily, Disp: 180 tablet, Rfl: 3     tiZANidine (ZANAFLEX) 2 MG tablet, Take 1-2 tablets (2-4 mg) by mouth 3 times daily, Disp: 90 tablet, Rfl: 1     torsemide (DEMADEX) 10 MG tablet, Take one tab (10 mg) with one 20 mg tab to = 30 mg daily in afternoon., Disp: 90 tablet, Rfl: 3     torsemide (DEMADEX) 20 MG tablet, Take 2 tabs (40 mg) in am daily, Disp: 105 tablet, Rfl: 10     traZODone (DESYREL) 50 MG tablet, TAKE 0.5-1.5 TABLETS (25-75 MG) BY MOUTH NIGHTLY AS NEEDED FOR SLEEP, Disp: 135 tablet, Rfl: 0     vitamin D3 (CHOLECALCIFEROL) 2000 units (50 mcg) tablet, Take 1 tablet (2,000 Units) by mouth daily, Disp: 90 tablet, Rfl: 3    Allergies:  Amoxicillin-pot clavulanate; Augmentin; Codeine; Codeine; Penicillins; Phenobarbital; Phenobarbital; and Seasonal allergies     Past Medical History:  Alcohol abuse  Allergic rhinitis   Antiphatelet   Atrial fibrillation  Cardiomegaly   Chest pain  Disorder of bone  Diverticulosis  Essential hypertension   Follicular bronchiolitis   Gastro-oesophageal reflux disease  ILD  Insomnia  Irregular heart beat  Lumbago  Major depressive disorder  Obstuctive sleep apnea  Osteoarthritis   Sjogren's syndrome  Sleep apnea   Tobacco use disorder    Past Surgical History:  Back Surgery  Biopsy Breast   C Appendectomy  Cardiac Surgery  Cholecsystectomy  Colectomy Left  Hysterectomy   Insert Stent Ureter  Sigmoidoscopy Flexible   Takedown Ileostomy    Social History:  She denies tobacco use and denies alcohol use.     Family History:  Positive: CAD (mother), Heart Disease (mother), Hypertension (mother, sister), Cerebrovascular Diseae (mother), Hyperlipidemia (father), Diabetes (sister), CAD (sister), Heart Disease (sister)    Physical Examination:  Height 1.676 m (5' 6\"), weight 90.3 kg (199 lb), not currently breastfeeding.  General  - normal appearance, in no obvious distress  HEENT  -Pupils " "equal, round, no conjunctival injection.  No lid lag  CV  - normal peripheral perfusion  Pulm  - normal respiratory pattern, non-labored  Musculoskeletal - lumbar spine  - stance: Antalgic, stooped gait, favoring right leg, utilizing a walker.  - inspection: Tender to palpation right sciatic notch. Non tender to palpation of sacroiliac joints or lumbar spine. No tenderness at the greater trochanter.   - palpation: no paravertebral or bony tenderness at lumbar spine.  - ROM: Forward flexion with \"pulling pain\" at the lateral leg.   - strength: lower extremities symmetric, full  Neuro  -no sensory or motor deficit, grossly normal coordination, normal muscle tone  Skin  - no ecchymosis, erythema, warmth, or induration, no obvious rash  Psych  - interactive, appropriate, normal mood and affect    Imaging:  CT scan of the lumbar spine (07/25/2018)  1. No acute fracture or traumatic subluxation.  2. Multilevel lumbar spondylosis, most pronounced at L4-5 and L5-S1.    Previous lumbar CT as a part of trauma series reviewed.     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 Previous trochanteric injections provided.    Plan:   - MRI of the low back for diagnostic purposes.  - Continue HEP provided by PT   - Tizanidine for muscle spasm relief.   - Walker for stability  - Red flags discussed; worsening weakness, saddle anesthesia, numbness, bowel bladder dysfunction.  - Follow up after MRI to discuss results and update plan    William SHERIDAN DO, have reviewed the above note and agree with the scribe's notation as written.    Scribe Disclosure:  Bryce SHERIDAN, am serving as a scribe to document services personally performed by William El DO at this visit, based upon the provider's statements to me. All documentation has " been reviewed by the aforementioned provider prior to being entered into the official medical record.

## 2020-02-04 NOTE — LETTER
2020       RE: Betty Tee  [address]  [facility] [address]     Dear Colleague,    Thank you for referring your patient, Betty Tee, to the [facility] SPORTS AND ORTHOPAEDIC WALK IN CLINIC at [facility]. Please see a copy of my visit note below.    [facility]  Orthopedics  William El, DO  2020     Name: Betty Tee  MRN: [id number]  Age: 79 year old  : 1940  Referring provider: Referred Self     Chief Complaint: Pain of the Right Leg    History of Present Illness:   Sister Betty Tee is a 79 year old female, with a history of bilateral hip inflammation, who presents today for evaluation of right low back pain sustained one month ago without any inciting event or injury. She endorses burning pain in her right glute that travels laterally down her leg and into her ankle. She also notes instability when transitioning from sitting to standing position. She also reports her knees buckling. Pain is exacerbated with abduction, transitioning positions, and at night. Pain is alleviated with stretching.     She ambulates with a walker and notes that her home exercise program is not painful. Of note, she had a corticosteroid injection for the bursitis in her hip two weeks ago, and that injection only relieved left greater trochanter pain but not the right side. No relief of shooting pain down right leg.      She was in PT today with Maegan Sigala and had some concerns about her overall condition and referred her on to see us today.    Review of Systems:   A 10-point review of systems was obtained and is negative except for as noted in the HPI.     Medications:      acetaminophen (TYLENOL) 500 MG tablet, Take 500 mg by mouth nightly as needed , Disp: , Rfl:      acyclovir (ZOVIRAX) 400 MG tablet, Take 1 tablet (400 mg) by mouth 3 times daily For a couple days, Disp: 15 tablet, Rfl: 2     acyclovir (ZOVIRAX) 5 % external ointment, Apply  topically 6 times daily As needed for outbreaks, Disp: 15 g, Rfl: 3     albuterol (PROAIR HFA, PROVENTIL HFA, VENTOLIN HFA) 108 (90 BASE) MCG/ACT inhaler, Inhale 2 puffs into the lungs every 6 hours, Disp: 1 Inhaler, Rfl: 3     atorvastatin (LIPITOR) 10 MG tablet, TAKE 1 TABLET BY MOUTH EVERY DAY, Disp: 90 tablet, Rfl: 1     azithromycin (ZITHROMAX) 250 MG tablet, Take 1 tablet (250 mg) by mouth daily, Disp: 30 tablet, Rfl: 11     BD JANE U/F 32G X 4 MM insulin pen needle, USE 4 DAILY AS DIRECTED., Disp: 400 each, Rfl: 1     blood glucose (ACCU-CHEK SHANNON PLUS) test strip, USE TO TEST BLOOD SUGARS 3 TIMES DAILY, Disp: 400 strip, Rfl: 3     blood glucose monitoring (ACCU-CHEK FASTCLIX) lancets, Use to test blood sugar 4 times daily or as directed.  Ok to substitute alternative if insurance prefers., Disp: 1 Box, Rfl: 11     blood glucose monitoring (ACCU-CHEK MULTICLIX) lancets, Use to test blood sugar 4 times daily or as directed., Disp: 4 Box, Rfl: 3     cevimeline (EVOXAC) 30 MG capsule, Take 1 capsule (30 mg) by mouth 3 times daily, Disp: 270 capsule, Rfl: 0     COMPRESSION STOCKINGS, Wear compression stockings in affected leg (right leg) or both legs most of the time during the day and take them off at night., Disp: 2 each, Rfl: 2     escitalopram (LEXAPRO) 20 MG tablet, TAKE 1 TABLET BY MOUTH EVERY DAY, Disp: 90 tablet, Rfl: 1     fluticasone (FLONASE) 50 MCG/ACT nasal spray, Spray 1-2 sprays into both nostrils daily as needed for allergies, Disp: 18.2 mL, Rfl: 11     fluticasone-vilanterol (BREO ELLIPTA) 100-25 MCG/INH inhaler, Inhale 1 puff into the lungs daily, Disp: 1 Inhaler, Rfl: 11     Humidifier MISC, Continuous humidified air via concentrator.  Diagnosis: ILD Duration: Lifetime 99., Disp: 1 each, Rfl: 1     hydroxychloroquine (PLAQUENIL) 200 MG tablet, Take 2 tablets (400 mg) by mouth daily Annual Plaquenil toxicity eye screening required., Disp: 180 tablet, Rfl: 1     insulin glargine (BASAGLAR  KWIKPEN) 100 UNIT/ML pen, INJECT 25 UNITS SUBCUTANEOUS DAILY (TO REPLACE LANTUS), Disp: 15 mL, Rfl: 1     ketoconazole (NIZORAL) 2 % external cream, Apply topically 2 times daily, Disp: 60 g, Rfl: 1     lidocaine (LIDODERM) 5 % patch, Apply patch to painful area for up to 12 h within a 24 h period.  Remove after 12 hours., Disp: 30 patch, Rfl: 5     loratadine (CLARITIN) 10 MG tablet, TAKE 1 TABLET BY MOUTH EVERY DAY, Disp: 90 tablet, Rfl: 1     metoprolol succinate ER (TOPROL-XL) 25 MG 24 hr tablet, Take 0.5 tablets (12.5 mg) by mouth 2 times daily Take 50 mg by mouth in combination with 12.5 for a total of 62.5 twice a day, Disp: 90 tablet, Rfl: 3     metoprolol succinate ER (TOPROL-XL) 50 MG 24 hr tablet, Take 50 mg by mouth in combination with 12.5 for a total of 62.5 twice a day, Disp: 180 tablet, Rfl: 3     montelukast (SINGULAIR) 10 MG tablet, TAKE 1 TABLET BY MOUTH EVERYDAY AT BEDTIME, Disp: 90 tablet, Rfl: 0     NOVOLOG FLEXPEN 100 UNIT/ML soln, INJECT 12 UNITS SUBCUTANEOUS 3 TIMES DAILY (WITH MEALS), Disp: 15 mL, Rfl: 1     order for DME, Please provide patient with a POC.  Okay to test patient on POC to maintain oxygen saturations above 90%.  Patient currently uses 2 to 3 LPM oxygen with activity., Disp: 1 Device, Rfl: 0     order for DME, Equipment being ordered: Oxygen Pulsed oxygen portable tank Rate: 4LNC Diagnosis: ILD Duration: Lifetime -99, Disp: 1 Device, Rfl: 1     order for DME, Equipment being ordered: Full face mask for CPAP - size: F10 large, Disp: 1 each, Rfl: 0     order for DME, Oxygen: Patient requires supplemental Oxygen 4 LPM via nasal canula with activity. Please provide patient with a home unit and with portability capability. Oxygen will be for a lifetime., Disp: 1 Device, Rfl: 0     potassium chloride ER (KLOR-CON) 20 MEQ CR tablet, Take 2 tablets (40 mEq) by mouth every morning AND 1 tablet (20 mEq) every evening., Disp: 270 tablet, Rfl: 3     predniSONE (DELTASONE) 5 MG tablet,  Take 1 tablet (5 mg) by mouth daily, Disp: 90 tablet, Rfl: 1     rivaroxaban ANTICOAGULANT (XARELTO) 20 MG TABS tablet, Take 1 tablet (20 mg) by mouth daily (with dinner), Disp: 90 tablet, Rfl: 3     Semaglutide,0.25 or 0.5MG/DOS, (OZEMPIC, 0.25 OR 0.5 MG/DOSE,) 2 MG/1.5ML SOPN, Inject 0.5 mg Subcutaneous once a week, Disp: 4.5 mL, Rfl: 1     spironolactone (ALDACTONE) 25 MG tablet, Take 2 tablets (50 mg) by mouth daily, Disp: 180 tablet, Rfl: 3     tiZANidine (ZANAFLEX) 2 MG tablet, Take 1-2 tablets (2-4 mg) by mouth 3 times daily, Disp: 90 tablet, Rfl: 1     torsemide (DEMADEX) 10 MG tablet, Take one tab (10 mg) with one 20 mg tab to = 30 mg daily in afternoon., Disp: 90 tablet, Rfl: 3     torsemide (DEMADEX) 20 MG tablet, Take 2 tabs (40 mg) in am daily, Disp: 105 tablet, Rfl: 10     traZODone (DESYREL) 50 MG tablet, TAKE 0.5-1.5 TABLETS (25-75 MG) BY MOUTH NIGHTLY AS NEEDED FOR SLEEP, Disp: 135 tablet, Rfl: 0     vitamin D3 (CHOLECALCIFEROL) 2000 units (50 mcg) tablet, Take 1 tablet (2,000 Units) by mouth daily, Disp: 90 tablet, Rfl: 3    Allergies:  Amoxicillin-pot clavulanate; Augmentin; Codeine; Codeine; Penicillins; Phenobarbital; Phenobarbital; and Seasonal allergies     Past Medical History:  Alcohol abuse  Allergic rhinitis   Antiphatelet   Atrial fibrillation  Cardiomegaly   Chest pain  Disorder of bone  Diverticulosis  Essential hypertension   Follicular bronchiolitis   Gastro-oesophageal reflux disease  ILD  Insomnia  Irregular heart beat  Lumbago  Major depressive disorder  Obstuctive sleep apnea  Osteoarthritis   Sjogren's syndrome  Sleep apnea   Tobacco use disorder    Past Surgical History:  Back Surgery  Biopsy Breast   C Appendectomy  Cardiac Surgery  Cholecsystectomy  Colectomy Left  Hysterectomy   Insert Stent Ureter  Sigmoidoscopy Flexible   Takedown Ileostomy    Social History:  She denies tobacco use and denies alcohol use.     Family History:  Positive: CAD (mother), Heart Disease (mother),  "Hypertension (mother, sister), Cerebrovascular Diseae (mother), Hyperlipidemia (father), Diabetes (sister), CAD (sister), Heart Disease (sister)    Physical Examination:  Height 1.676 m (5' 6\"), weight 90.3 kg (199 lb), not currently breastfeeding.  General  - normal appearance, in no obvious distress  HEENT  -Pupils equal, round, no conjunctival injection.  No lid lag  CV  - normal peripheral perfusion  Pulm  - normal respiratory pattern, non-labored  Musculoskeletal - lumbar spine  - stance: Antalgic, stooped gait, favoring right leg, utilizing a walker.  - inspection: Tender to palpation right sciatic notch. Non tender to palpation of sacroiliac joints or lumbar spine. No tenderness at the greater trochanter.   - palpation: no paravertebral or bony tenderness at lumbar spine.  - ROM: Forward flexion with \"pulling pain\" at the lateral leg.   - strength: lower extremities symmetric, full  Neuro  -no sensory or motor deficit, grossly normal coordination, normal muscle tone  Skin  - no ecchymosis, erythema, warmth, or induration, no obvious rash  Psych  - interactive, appropriate, normal mood and affect    Imaging:  CT scan of the lumbar spine (07/25/2018)  1. No acute fracture or traumatic subluxation.  2. Multilevel lumbar spondylosis, most pronounced at L4-5 and L5-S1.    Previous lumbar CT as a part of trauma series reviewed.     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 Previous trochanteric injections provided.    Plan:   - MRI of the low back for diagnostic purposes.  - Continue HEP provided by PT   - Tizanidine for muscle spasm relief.   - Walker for stability  - Red flags discussed; worsening weakness, saddle anesthesia, numbness, bowel bladder dysfunction.  - Follow up after MRI to discuss results and " "update plan    I, William El DO, have reviewed the above note and agree with the scribe's notation as written.    Scribe Disclosure:  I, Bryce Johnson, am serving as a scribe to document services personally performed by William El DO at this visit, based upon the provider's statements to me. All documentation has been reviewed by the aforementioned provider prior to being entered into the official medical record.               SPORTS & ORTHOPEDIC WALK-IN VISIT 2/4/2020    Primary Care Physician: Dr. Tristan    Had history of Bilateral hip inflammation which was treated with a CSI which had helped the L greater trochanter area but not the R side.    Mentions pain that stems from the R glute area that travels laterally down her leg and into her ankle. Notes some instability when transitioning from sitting to standing, feels like her R leg will \"give out.\" Uses a walker for added stability (mentions she does not normally use this).     Most of the suggested HEP is non-painful.    Reason for visit:     What part of your body is injured / painful?  right low back    What caused the injury /pain? Unsure    How long ago did your injury occur or pain begin? about a month ago    What are your most bothersome symptoms? Pain and Other: burning radicular Sx    How would you characterize your symptom?  burning    What makes your symptoms better? Rest - stretching    What makes your symptoms worse? Other: abduction, transitioning from sitting to standing, nights are especially painful    Have you been previously seen for this problem? Yes, Rheumatology, PT    Medical History:    Any recent changes to your medical history? No    Any new medication prescribed since last visit? No    Have you had surgery on this body part before? Yes 1961 - L5 Discectomy    Social History:    Occupation: retired    Handedness: Right    Exercise: None    Review of Systems:    Do you have fever, chills, weight loss? No    Do you have any vision " problems? No    Do you have any chest pain or edema? Congestive heart failure, AFIB,    Do you have any shortness of breath or wheezing?  Yes, Occasional    Do you have stomach problems? No    Do you have any numbness or focal weakness? Yes, R leg weakness    Do you have diabetes? Yes, Type 2    Do you have problems with bleeding or clotting? No    Do you have an rashes or other skin lesions? No         Again, thank you for allowing me to participate in the care of your patient.      Sincerely,    William El, DO

## 2020-02-04 NOTE — TELEPHONE ENCOUNTER
Zulma Lopez MD Beard, Madeline, RN   Caller: Unspecified (5 days ago,  2:13 PM)             Could try zanaflex 2 mg every 8 hr prn      Pt was seen by sports medicine today who recommended same medication.  Will connect with pt tomorrow.    Kamille Ruffin RN  Rheumatology Clinic

## 2020-02-06 RX ORDER — ATORVASTATIN CALCIUM 10 MG/1
5 TABLET, FILM COATED ORAL DAILY
Qty: 45 TABLET | Refills: 0 | Status: SHIPPED | OUTPATIENT
Start: 2020-02-06 | End: 2020-05-13

## 2020-02-06 NOTE — TELEPHONE ENCOUNTER
Called pt to check in on her symptoms, and to discuss lab results...  R leg sx's are still quite bad.  R hip pain, and nerve pain into her foot.  Needing to use walker as she is afraid she will fall when she starts walking - better once she's been walking for a bit.  Had a lot of pain during a road trip to [place] yesterday.    She did go in to see ortho yesterday at [facility] walk-in clinic (thanked her for doing so), and they recommended an MRI, and gave her a rx for tizanidine.  She's used the tizanidine the last two nights, but unfortunately it hasn't helped much if at all.  She will likely get the MRI soon.  She also mentioned that her PT thought she might benefit from pool therapy.  She will ask ortho about referral depending on MRI results.  Unsure if we are able to easily get her to pool therapy from our referral options.    Discussed lab results...  --A1C up from 6.3 to 7.0 - likely up due to dropping the metformin due to GI se's.   Okay to stay at 7.0.  Will have her cont off the metformin and on other DM meds.  --Lipid panel fine, but with very low LDL at '1'.  Currently on lipitor 10mg/d.  Discussed with MTM.  Thoughts about too-low LDLs mixed, guidelines not clear, but with it being so low, will lower lipitor to 5mg/d (she'll start on Sunday when setting up new weekly pill-box with [facility]).  Will send in a new rx for them to fill when needed- no 5mg tabs unfortunately.    Pt agrees with plan, and that she 'takes back' the 'not my favorite doctor' part. :)  CW

## 2020-02-06 NOTE — RESULT ENCOUNTER NOTE
Called pt and discussed lab results...  --A1C up from 6.3 to 7.0 - likely up due to dropping the metformin due to GI se's.   Okay to stay at 7.0.  Will have her cont off the metformin and on other DM meds.  --Lipid panel fine, but with very low LDL at '1'.  Currently on lipitor 10mg/d.  Discussed with MTM.  Thoughts about too-low LDLs mixed, guidelines not clear, but with it being so low, will lower lipitor to 5mg/d (she'll start on Sunday when setting up new weekly pill-box with [facility]).  Will send in a new rx for them to fill when needed- no 5mg tabs unfortunately.  CW

## 2020-02-07 NOTE — TELEPHONE ENCOUNTER
Attempted to connect with pt on 2/5, no answer.  Called and spoke with pt today, walk in provider did recommend that she have an MRI done.  Have encouraged her to schedule this  She is continuing to have pain. Advised her that at this time, the ortho clinic is the best place as this is not due to her rheumatic condition.  Provided number to MRI scheduling.    Pt had no further questions.    Kamille Ruffin RN  Rheumatology Clinic

## 2020-02-08 ENCOUNTER — ANCILLARY PROCEDURE (OUTPATIENT)
Dept: MRI IMAGING | Facility: CLINIC | Age: 80
End: 2020-02-08
Attending: FAMILY MEDICINE
Payer: MEDICARE

## 2020-02-08 DIAGNOSIS — M54.16 LUMBAR BACK PAIN WITH RADICULOPATHY AFFECTING RIGHT LOWER EXTREMITY: ICD-10-CM

## 2020-02-13 DIAGNOSIS — M85.80 OSTEOPENIA, UNSPECIFIED LOCATION: Primary | ICD-10-CM

## 2020-02-14 ENCOUNTER — TELEPHONE (OUTPATIENT)
Dept: RHEUMATOLOGY | Facility: CLINIC | Age: 80
End: 2020-02-14

## 2020-02-14 NOTE — TELEPHONE ENCOUNTER
We are writing to inform you of your test results. Improved CRP inflammation marker. There is faint monoclonal protein (abnormal protein) which could be a lab error. I ordered additional labs to make sure it is not present. Also recommend vit D 2000 units a day to keep vit D at goal, around 40.    Called and updated pt with lab results.  Pt understands, will have testing done tomorrow.  She will continue taking Vitamin D.    Kamille Ruffin RN  Rheumatology Clinic

## 2020-02-14 NOTE — RESULT ENCOUNTER NOTE
Improved CRP inflammation marker. There is faint monoclonal protein (abnormal protein) which could be a lab error. I ordered additional labs to make sure it is not present. Also recommend vit D 2000 units a day to keep vit D at goal, around 40.

## 2020-02-15 ENCOUNTER — OFFICE VISIT (OUTPATIENT)
Dept: ORTHOPEDICS | Facility: CLINIC | Age: 80
End: 2020-02-15
Payer: MEDICARE

## 2020-02-15 VITALS — BODY MASS INDEX: 31.98 KG/M2 | HEIGHT: 66 IN | WEIGHT: 199 LBS

## 2020-02-15 DIAGNOSIS — M54.16 LUMBAR BACK PAIN WITH RADICULOPATHY AFFECTING RIGHT LOWER EXTREMITY: Primary | ICD-10-CM

## 2020-02-15 DIAGNOSIS — M51.369 LUMBAR DEGENERATIVE DISC DISEASE: ICD-10-CM

## 2020-02-15 DIAGNOSIS — M85.80 OSTEOPENIA, UNSPECIFIED LOCATION: ICD-10-CM

## 2020-02-15 PROCEDURE — 86334 IMMUNOFIX E-PHORESIS SERUM: CPT | Performed by: INTERNAL MEDICINE

## 2020-02-15 PROCEDURE — 82784 ASSAY IGA/IGD/IGG/IGM EACH: CPT | Performed by: INTERNAL MEDICINE

## 2020-02-15 PROCEDURE — 86335 IMMUNFIX E-PHORSIS/URINE/CSF: CPT | Performed by: INTERNAL MEDICINE

## 2020-02-15 ASSESSMENT — MIFFLIN-ST. JEOR: SCORE: 1394.41

## 2020-02-15 NOTE — PROGRESS NOTES
"ESTABLISHED PATIENT FOLLOW-UP:  Follow Up of the Lower Back       HISTORY OF PRESENT ILLNESS  Ms. Tee is a pleasant 79 year old year old female who presents to clinic today for follow-up of lumbago with right sided radiculopathy      Follow up reason: Lumbar MRI    Date of injury: About a month ago    Date last seen: 2/4/20    Following Therapeutic Plan: Yes     Pain: Unchanged    Function: Unchanged    Interval History:  She still has a heaviness in right leg    Interval History: No relief to date. Continues to experience debilitating radicular pain despite treatment including; Tizanidine, prednisone, PT.      Additional medical/Social/Surgical histories reviewed in [facility] and updated as appropriate.    REVIEW OF SYSTEMS (2/15/2020)  CONSTITUTIONAL: Denies fever and weight loss  GASTROINTESTINAL: Denies abdominal pain, nausea, vomiting  MUSCULOSKELETAL: See HPI  SKIN: Denies any recent rash or lesion  NEUROLOGICAL: Denies numbness or focal weakness     PHYSICAL EXAM  Ht 1.676 m (5' 6\")   Wt 90.3 kg (199 lb)   BMI 32.12 kg/m      General Appearance: Well appearing, alert, in no acute distress, well-hydrated, and well nourished  Skin: No rashes, lesions, or ecchymosis present  Cardiovascular: pedal pulses and radial pulses normal, no signs of upper or lower extremity edema  Respiratory: no respiratory distress, no audible wheezing, no labored breathing, symmetric thoracic excursion  Psychiatric: mood and affect are appropriate, patient is oriented to time, place and person  Extremities: no peripheral edema noted in the upper extremities.    IMAGING :   MRI LUMBAR SPINE W/O CONTRAST  2/8/20                                           Impression:  1. Degenerative spondylosis as described above. Most notably, there is  grade 1 anterolisthesis/asymmetric calcified disc bulge at L5-S1 with  mild effacement of the right lateral recess and abutment of the right  descending S1 nerve root.  2. There is also mild foraminal " narrowing at multiple levels described  above.  3. Suspected chronic compression deformity at T7 and T4, based on  limited  images.     I have personally reviewed the examination and initial interpretation  and I agree with the findings.     KOFFI SINGH MD     ASSESSMENT & PLAN  Ms. Tee is a 79 year old year old female who presents to clinic today with ongoing right lower back pain with radiculitis affecting right lower extremity.  Disc bulge ant L5-S1 with effacement of right lateral recess affecting S1 nerve root.  Treatment options discussed at length and given her symptoms, goals for recovery, we will proceed with a L5-S1  interlaminar injection.  I discussed that this will help her to augment the effectiveness of physical therapy.  She should continue physical therapy.  I also recommended that she follow-up in 2 weeks after injection to discuss her overall condition.  If her response to epidural is limited, may consider gabapentin at that time.  She is very recalcitrant to the idea of any surgical intervention.    It was a pleasure seeing Betty.    William El DO, CAM  Primary Care Sports Medicine

## 2020-02-15 NOTE — LETTER
RE: Betty Tee  [address]  [facility] [address]     Dear Colleague,    Thank you for referring your patient, Betty Tee, to the [facility] SPORTS AND ORTHOPAEDIC WALK IN CLINIC at [facility]. Please see a copy of my visit note below.          SPORTS & ORTHOPEDIC WALK-IN FOLLOW-UP VISIT 2/15/2020    Interval History:     Follow up reason: Lumbar MRI    Date of injury: About a month ago    Date last seen: 2/4/20    Following Therapeutic Plan: Yes     Pain: Unchanged    Function: Unchanged    Interval History:  She still has a heaviness in right leg    Medical History:    Any recent changes to your medical history? No    Any new medication prescribed since last visit? No    Review of Systems:    Do you have fever, chills, weight loss? No    Do you have any vision problems? No    Do you have any chest pain or edema? Yes, Congestive heart failure, AFIB    Do you have any shortness of breath or wheezing?  Yes, occasionally    Do you have stomach problems? No    Do you have any numbness or focal weakness? Yes, Right leg weakness    Do you have diabetes? Yes, type 2    Do you have problems with bleeding or clotting? No    Do you have an rashes or other skin lesions? No           ESTABLISHED PATIENT FOLLOW-UP:  Follow Up of the Lower Back    HISTORY OF PRESENT ILLNESS  Ms. Tee is a pleasant 79 year old year old female who presents to clinic today for follow-up of lumbago with right sided radiculopathy      Follow up reason: Lumbar MRI    Date of injury: About a month ago    Date last seen: 2/4/20    Following Therapeutic Plan: Yes     Pain: Unchanged    Function: Unchanged    Interval History:  She still has a heaviness in right leg    Interval History: No relief to date. Continues to experience debilitating radicular pain despite treatment including; Tizanidine, prednisone, PT.      Additional medical/Social/Surgical histories reviewed in [facility] and updated  "as appropriate.    REVIEW OF SYSTEMS (2/15/2020)  CONSTITUTIONAL: Denies fever and weight loss  GASTROINTESTINAL: Denies abdominal pain, nausea, vomiting  MUSCULOSKELETAL: See HPI  SKIN: Denies any recent rash or lesion  NEUROLOGICAL: Denies numbness or focal weakness     PHYSICAL EXAM  Ht 1.676 m (5' 6\")   Wt 90.3 kg (199 lb)   BMI 32.12 kg/m       General Appearance: Well appearing, alert, in no acute distress, well-hydrated, and well nourished  Skin: No rashes, lesions, or ecchymosis present  Cardiovascular: pedal pulses and radial pulses normal, no signs of upper or lower extremity edema  Respiratory: no respiratory distress, no audible wheezing, no labored breathing, symmetric thoracic excursion  Psychiatric: mood and affect are appropriate, patient is oriented to time, place and person  Extremities: no peripheral edema noted in the upper extremities.    IMAGING :   MRI LUMBAR SPINE W/O CONTRAST  2/8/20                                           Impression:  1. Degenerative spondylosis as described above. Most notably, there is  grade 1 anterolisthesis/asymmetric calcified disc bulge at L5-S1 with  mild effacement of the right lateral recess and abutment of the right  descending S1 nerve root.  2. There is also mild foraminal narrowing at multiple levels described  above.  3. Suspected chronic compression deformity at T7 and T4, based on  limited  images.     I have personally reviewed the examination and initial interpretation  and I agree with the findings.     KOFFI SINGH MD     ASSESSMENT & PLAN  Ms. Tee is a 79 year old year old female who presents to clinic today with ongoing right lower back pain with radiculitis affecting right lower extremity.  Disc bulge ant L5-S1 with effacement of right lateral recess affecting S1 nerve root.  Treatment options discussed at length and given her symptoms, goals for recovery, we will proceed with a L5-S1  interlaminar injection.  I discussed that " this will help her to augment the effectiveness of physical therapy.  She should continue physical therapy.  I also recommended that she follow-up in 2 weeks after injection to discuss her overall condition.  If her response to epidural is limited, may consider gabapentin at that time.  She is very recalcitrant to the idea of any surgical intervention.    It was a pleasure seeing Betty.    Willaim El DO, [facility]  Primary Care Sports Medicine

## 2020-02-15 NOTE — PROGRESS NOTES
SPORTS & ORTHOPEDIC WALK-IN FOLLOW-UP VISIT 2/15/2020    Interval History:     Follow up reason: Lumbar MRI    Date of injury: About a month ago    Date last seen: 2/4/20    Following Therapeutic Plan: Yes     Pain: Unchanged    Function: Unchanged    Interval History:  She still has a heaviness in right leg    Medical History:    Any recent changes to your medical history? No    Any new medication prescribed since last visit? No    Review of Systems:    Do you have fever, chills, weight loss? No    Do you have any vision problems? No    Do you have any chest pain or edema? Yes, Congestive heart failure, AFIB    Do you have any shortness of breath or wheezing?  Yes, occasionally    Do you have stomach problems? No    Do you have any numbness or focal weakness? Yes, Right leg weakness    Do you have diabetes? Yes, type 2    Do you have problems with bleeding or clotting? No    Do you have an rashes or other skin lesions? No

## 2020-02-15 NOTE — LETTER
April 6, 2020      Betty Tee  [address]  [facility] [address]        Dear ,    We are writing to inform you of your test results.    There is a possibility of very small monoclonal protein which could bee seen at older age and could be simply monitored/repeated every year. However I could set up a hematology virtual visit to review and monitor this for you. Please let me know if you agree with this plan.    Resulted Orders   Protein immunofixation urine   Result Value Ref Range    Immunofix ELP Urine       No monoclonal protein seen on immunofixation.  Pathological significance requires clinical   correlation.        Comment:      Edwin Herrera M.D.   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 Pathological significance requires clinical correlation.  DONNA Herrera M.D., Pathologist.      IGG 1,025 610 - 1,616 mg/dL     (H) 84 - 499 mg/dL     35 - 242 mg/dL     Zulma Lopez MD

## 2020-02-16 DIAGNOSIS — G47.01 INSOMNIA DUE TO MEDICAL CONDITION: ICD-10-CM

## 2020-02-16 DIAGNOSIS — M35.02 SJOGREN'S SYNDROME WITH LUNG INVOLVEMENT (H): ICD-10-CM

## 2020-02-17 LAB
IGA SERPL-MCNC: 602 MG/DL (ref 84–499)
IGG SERPL-MCNC: 1025 MG/DL (ref 610–1616)
IGM SERPL-MCNC: 134 MG/DL (ref 35–242)
PROT ELPH PNL UR ELPH: NORMAL
PROT PATTERN SERPL IFE-IMP: ABNORMAL

## 2020-02-17 RX ORDER — TRAZODONE HYDROCHLORIDE 50 MG/1
25-75 TABLET, FILM COATED ORAL
Qty: 135 TABLET | Refills: 0 | Status: SHIPPED | OUTPATIENT
Start: 2020-02-17 | End: 2020-05-11

## 2020-02-17 NOTE — TELEPHONE ENCOUNTER
"Trazodone 50MG   Last Written Prescription Date:  11/25/2019  Last Fill Quantity: 135,  # refills: 0   Last office visit: 1/21/2020 with prescribing provider:  Yes    Future Office Visit:   Next 5 appointments (look out 90 days)    Apr 15, 2020 11:00 AM CDT  Office Visit with Ilene Tristan MD  [facility] ([facility]) [address] [facility] [address] [phone]         Requested Prescriptions   Pending Prescriptions Disp Refills     traZODone (DESYREL) 50 MG tablet [Pharmacy Med Name: TRAZODONE 50 MG TABLET] 135 tablet 0     Sig: TAKE 0.5-1.5 TABLETS (25-75 MG) BY MOUTH NIGHTLY AS NEEDED FOR SLEEP       Serotonin Modulators Passed - 2/16/2020 10:08 AM        Passed - Recent (12 mo) or future (30 days) visit within the authorizing provider's specialty     Patient has had an office visit with the authorizing provider or a provider within the authorizing providers department within the previous 12 mos or has a future within next 30 days. See \"Patient Info\" tab in inbasket, or \"Choose Columns\" in Meds & Orders section of the refill encounter.              Passed - Medication is active on med list        Passed - Patient is age 18 or older        Passed - No active pregnancy on record        Passed - No positive pregnancy test in past 12 months          "

## 2020-02-17 NOTE — TELEPHONE ENCOUNTER
Prescription approved per [facility] Refill Protocol.  Lydia HALEY RN

## 2020-02-18 RX ORDER — CEVIMELINE HYDROCHLORIDE 30 MG/1
30 CAPSULE ORAL 3 TIMES DAILY
Qty: 270 CAPSULE | Refills: 2 | Status: SHIPPED | OUTPATIENT
Start: 2020-02-18 | End: 2021-05-18

## 2020-02-19 DIAGNOSIS — I48.21 PERMANENT ATRIAL FIBRILLATION (H): ICD-10-CM

## 2020-02-19 DIAGNOSIS — I50.30 (HFPEF) HEART FAILURE WITH PRESERVED EJECTION FRACTION (H): ICD-10-CM

## 2020-02-19 NOTE — TELEPHONE ENCOUNTER
cevimeline (EVOXAC) 30 MG capsule      Last Written Prescription Date:  10/14/19  Last Fill Quantity: 270,   # refills: 0  Last Office Visit : 1/15/20  Future Office visit: 6/11/20    Refill to pharmacy.

## 2020-02-20 ENCOUNTER — TELEPHONE (OUTPATIENT)
Dept: ORTHOPEDICS | Facility: CLINIC | Age: 80
End: 2020-02-20

## 2020-02-20 NOTE — TELEPHONE ENCOUNTER
LVM for Sister Randal regarding her questions about the medication.  I left the walk in number for her to call and provide us more specific details in case she has a medication in mind.     - Wayne RIVERA ATC

## 2020-02-20 NOTE — TELEPHONE ENCOUNTER
Estimated Creatinine Clearance: 51.1 mL/min (based on SCr of 1.01 mg/dL).     Last Clinic Visit: 8-8-19  Recommended RTC 1 year.      Kathleen M Doege RN

## 2020-02-20 NOTE — TELEPHONE ENCOUNTER
[facility] Call Center    Phone Message    May a detailed message be left on voicemail: yes     Reason for Call: Other: Pt would like a call back regarding pain as tylenal is not working for her and would like a little bit stronger muscle relaxer with out using any Narco. PLease contact pt to discuss     Action Taken: Message routed to:  [facility] Ortho    Travel Screening: Not Applicable

## 2020-02-21 ENCOUNTER — THERAPY VISIT (OUTPATIENT)
Dept: PHYSICAL THERAPY | Facility: CLINIC | Age: 80
End: 2020-02-21
Payer: MEDICARE

## 2020-02-21 ENCOUNTER — TELEPHONE (OUTPATIENT)
Dept: MEDSURG UNIT | Facility: CLINIC | Age: 80
End: 2020-02-21

## 2020-02-21 DIAGNOSIS — M54.41 BILATERAL LOW BACK PAIN WITH RIGHT-SIDED SCIATICA: Primary | ICD-10-CM

## 2020-02-21 DIAGNOSIS — M54.16 LUMBAR BACK PAIN WITH RADICULOPATHY AFFECTING RIGHT LOWER EXTREMITY: Primary | ICD-10-CM

## 2020-02-21 PROCEDURE — 97140 MANUAL THERAPY 1/> REGIONS: CPT | Mod: GP | Performed by: PHYSICAL THERAPIST

## 2020-02-21 PROCEDURE — 97110 THERAPEUTIC EXERCISES: CPT | Mod: GP | Performed by: PHYSICAL THERAPIST

## 2020-02-21 RX ORDER — CYCLOBENZAPRINE HCL 5 MG
5 TABLET ORAL 2 TIMES DAILY PRN
Qty: 30 TABLET | Refills: 0 | Status: ON HOLD | OUTPATIENT
Start: 2020-02-21 | End: 2020-03-04

## 2020-02-21 NOTE — TELEPHONE ENCOUNTER
Sister Nabila returned Cruz' call;  Still having muscle and nerve pain. Taking tylenol and not helping. Looking for a different muscle relaxer than what she was given. Does not want narcotics, but needs something stronger than tylenol. She knows its nerve and muscle pain and she is not comfortable.

## 2020-02-23 ENCOUNTER — MYC REFILL (OUTPATIENT)
Dept: PULMONOLOGY | Facility: CLINIC | Age: 80
End: 2020-02-23

## 2020-02-23 DIAGNOSIS — J44.89 FOLLICULAR BRONCHIOLITIS (H): ICD-10-CM

## 2020-02-24 ENCOUNTER — OFFICE VISIT (OUTPATIENT)
Dept: ORTHOPEDICS | Facility: CLINIC | Age: 80
End: 2020-02-24
Payer: MEDICARE

## 2020-02-24 VITALS — WEIGHT: 199 LBS | BODY MASS INDEX: 31.98 KG/M2 | HEIGHT: 66 IN

## 2020-02-24 DIAGNOSIS — M54.16 LUMBAR BACK PAIN WITH RADICULOPATHY AFFECTING RIGHT LOWER EXTREMITY: Primary | ICD-10-CM

## 2020-02-24 RX ORDER — AZITHROMYCIN 250 MG/1
250 TABLET, FILM COATED ORAL DAILY
Qty: 30 TABLET | Refills: 11 | Status: SHIPPED | OUTPATIENT
Start: 2020-02-24 | End: 2021-02-23

## 2020-02-24 RX ORDER — GABAPENTIN 300 MG/1
300 CAPSULE ORAL 3 TIMES DAILY
Qty: 90 CAPSULE | Refills: 0 | Status: SHIPPED | OUTPATIENT
Start: 2020-02-24 | End: 2020-10-06

## 2020-02-24 RX ORDER — HYDROCODONE BITARTRATE AND ACETAMINOPHEN 5; 325 MG/1; MG/1
1 TABLET ORAL EVERY 6 HOURS PRN
Qty: 15 TABLET | Refills: 0 | Status: ON HOLD | OUTPATIENT
Start: 2020-02-24 | End: 2020-03-04

## 2020-02-24 ASSESSMENT — MIFFLIN-ST. JEOR: SCORE: 1394.41

## 2020-02-24 NOTE — PROGRESS NOTES
SPORTS & ORTHOPEDIC WALK-IN FOLLOW-UP VISIT 2/24/2020    Interval History:     Follow up reason: Right leg/low back    Date of injury: About a month    Date last seen: 2/4/20    Following Therapeutic Plan: Yes     Pain: Worsening    Function: Worsening    Interval History:  The majority of her pain is in the low back and groin today. She is having difficulty lifting the leg.     Medical History:    Any recent changes to your medical history? No    Any new medication prescribed since last visit? No    Review of Systems:    Do you have fever, chills, weight loss? No    Do you have any vision problems? No    Do you have any chest pain or edema? Yes, congestive heart failure AFIB    Do you have any shortness of breath or wheezing?  Yes, occasionally    Do you have stomach problems? No    Do you have any numbness or focal weakness? Yes, right leg    Do you have diabetes? Yes, type 2    Do you have problems with bleeding or clotting? No    Do you have an rashes or other skin lesions? No

## 2020-02-24 NOTE — PROGRESS NOTES
"[facility]  Orthopedics  William El,   2020     Name: Betty Tee  MRN: [id number]  Age: 79 year old  : 1940  Referring provider: Referred Self     Chief Complaint: Follow up right leg and low back pain     Date of Onset: 2020    History of Present Illness:   Betty Tee is a 79 year old female who presents today for follow-up regarding right leg and lower back pain. I last evaluated the patient on 2/15/2020 at which time we proceeded with a L5-S1 interlaminar injection. In clinic today, she states that the injection and muscle relaxants have offered minimal relief. The majority of her pain today is located in the low back and groin. Additionally, she is having difficulty lifting her right leg.     Review of Systems:   A 10-point review of systems was obtained and is negative except for as noted in the HPI.     Physical Examination:  Height 1.676 m (5' 6\"), weight 90.3 kg (199 lb), not currently breastfeeding.    General Appearance: Well appearing, alert, in no acute distress, well-hydrated, and well nourished  HEENT: Pupils equal, round, no conjunctival injection.  No lid lag  Cardiovascular: pedal pulses and radial pulses normal, no signs of upper or lower extremity edema  Respiratory: no respiratory distress, no audible wheezing, no labored breathing, symmetric thoracic excursion  MSK: Walking with antalgic gait. Lumbar paraspinals R>L with hypertonicitiy, tender at right gluteal region, pain with forward flexion +SLR on right. No significant weakness of right or left lower extremity.  Psychiatric: mood and affect are appropriate, patient is oriented to time, place and person  Extremities: no peripheral edema noted in the upper extremities.    Imaging:  No new imaging.    Assessment:   79 year old female presenting for follow-up of contractible pain related to known lumbar radiculitis. Unfortunately, she does not have her ANDRAE for another 3 days. I will provide her with pain " medication to bridge her until her procedure on 2/27/2020. I am also providing Gabapentin, which she should continue until I see her at next visit.    Plan:   -Discontinue muscle relaxants  -Gabapentin prescribed  -Hydrocodone prescribed  -Unable to utilize NSAIDs due to use of xarelto  -Follow up 2 week after ANDRAE    It was a pleasure seeing Betty today.    William El DO, [facility]  Primary Care Sports Medicine    I, William El DO, have reviewed the above note and agree with the scribe's notation as written.    Scribe Disclosure:  I, Michelle Dutton, am serving as a scribe to document services personally performed by William El DO at this visit, based upon the provider's statements to me. All documentation has been reviewed by the aforementioned provider prior to being entered into the official medical record.

## 2020-02-24 NOTE — PATIENT INSTRUCTIONS
1. Start Gabapentin 1 pill at night and one pill in the morning  After 3 days increase to one pill in the morning, afternoon and before bed    If too tired with gabapentin three times daily, try one pill in morning and two pills before bed*    2. Norco (hydrocodone-acetaminophen) every six hours only as needed for severe pain.

## 2020-02-24 NOTE — LETTER
RE: Betty Tee  [address]  [place] [facility] [address]     Dear Colleague,    Thank you for referring your patient, Betty Tee, to the [facility] SPORTS AND ORTHOPAEDIC WALK IN CLINIC at [facility]. Please see a copy of my visit note below.          SPORTS & ORTHOPEDIC WALK-IN FOLLOW-UP VISIT 2020    Interval History:     Follow up reason: Right leg/low back    Date of injury: About a month    Date last seen: 20    Following Therapeutic Plan: Yes     Pain: Worsening    Function: Worsening    Interval History:  The majority of her pain is in the low back and groin today. She is having difficulty lifting the leg.     Medical History:    Any recent changes to your medical history? No    Any new medication prescribed since last visit? No    Review of Systems:    Do you have fever, chills, weight loss? No    Do you have any vision problems? No    Do you have any chest pain or edema? Yes, congestive heart failure AFIB    Do you have any shortness of breath or wheezing?  Yes, occasionally    Do you have stomach problems? No    Do you have any numbness or focal weakness? Yes, right leg    Do you have diabetes? Yes, type 2    Do you have problems with bleeding or clotting? No    Do you have an rashes or other skin lesions? No         [facility]  Orthopedics  William El,   2020     Name: Betty Tee  MRN: [id number]  Age: 79 year old  : 1940  Referring provider: Referred Self     Chief Complaint: Follow up right leg and low back pain     Date of Onset: 2020    History of Present Illness:   Betty Tee is a 79 year old female who presents today for follow-up regarding right leg and lower back pain. I last evaluated the patient on 2/15/2020 at which time we proceeded with a L5-S1 interlaminar injection. In clinic today, she states that the injection and muscle relaxants have offered minimal relief. The majority of her pain today  "is located in the low back and groin. Additionally, she is having difficulty lifting her right leg.     Review of Systems:   A 10-point review of systems was obtained and is negative except for as noted in the HPI.     Physical Examination:  Height 1.676 m (5' 6\"), weight 90.3 kg (199 lb), not currently breastfeeding.    General Appearance: Well appearing, alert, in no acute distress, well-hydrated, and well nourished  HEENT: Pupils equal, round, no conjunctival injection.  No lid lag  Cardiovascular: pedal pulses and radial pulses normal, no signs of upper or lower extremity edema  Respiratory: no respiratory distress, no audible wheezing, no labored breathing, symmetric thoracic excursion  MSK: Walking with antalgic gait. Lumbar paraspinals R>L with hypertonicitiy, tender at right gluteal region, pain with forward flexion +SLR on right. No significant weakness of right or left lower extremity.  Psychiatric: mood and affect are appropriate, patient is oriented to time, place and person  Extremities: no peripheral edema noted in the upper extremities.    Imaging:  No new imaging.    Assessment:   79 year old female presenting for follow-up of contractible pain related to known lumbar radiculitis. Unfortunately, she does not have her ANDRAE for another 3 days. I will provide her with pain medication to bridge her until her procedure on 2/27/2020. I am also providing Gabapentin, which she should continue until I see her at next visit.    Plan:   -Discontinue muscle relaxants  -Gabapentin prescribed  -Hydrocodone prescribed  -Unable to utilize NSAIDs due to use of xarelto  -Follow up 2 week after ANDRAE    It was a pleasure seeing Betty today.    William El DO, [facility]  Primary Care Sports Medicine    I, William El DO, have reviewed the above note and agree with the scribe's notation as written.    Scribe Disclosure:  I, Michelle Dutton, am serving as a scribe to document services personally performed by William El, " DO at this visit, based upon the provider's statements to me. All documentation has been reviewed by the aforementioned provider prior to being entered into the official medical record.

## 2020-02-27 ENCOUNTER — HOSPITAL ENCOUNTER (OUTPATIENT)
Facility: CLINIC | Age: 80
Discharge: HOME | End: 2020-02-27
Attending: RADIOLOGY | Admitting: RADIOLOGY
Payer: MEDICARE

## 2020-02-27 VITALS
SYSTOLIC BLOOD PRESSURE: 131 MMHG | HEART RATE: 53 BPM | RESPIRATION RATE: 16 BRPM | DIASTOLIC BLOOD PRESSURE: 64 MMHG | TEMPERATURE: 96.6 F | OXYGEN SATURATION: 100 %

## 2020-02-27 PROCEDURE — 40000863 ZZH STATISTIC RADIOLOGY XRAY, US, CT, MAR, NM

## 2020-02-27 RX ORDER — NICOTINE POLACRILEX 4 MG
15-30 LOZENGE BUCCAL
Status: DISCONTINUED | OUTPATIENT
Start: 2020-02-27 | End: 2020-02-27 | Stop reason: HOSPADM

## 2020-02-27 RX ORDER — DEXTROSE MONOHYDRATE 25 G/50ML
25-50 INJECTION, SOLUTION INTRAVENOUS
Status: DISCONTINUED | OUTPATIENT
Start: 2020-02-27 | End: 2020-02-27 | Stop reason: HOSPADM

## 2020-02-27 RX ORDER — NICOTINE POLACRILEX 4 MG
15-30 LOZENGE BUCCAL
Status: CANCELLED | OUTPATIENT
Start: 2020-02-27

## 2020-02-27 RX ORDER — DEXTROSE MONOHYDRATE 25 G/50ML
25-50 INJECTION, SOLUTION INTRAVENOUS
Status: CANCELLED | OUTPATIENT
Start: 2020-02-27

## 2020-02-27 NOTE — DISCHARGE INSTRUCTIONS
Steroid Injection Discharge Instructions     After you go home:      You may resume your normal diet.    Care of Puncture Site:      If you have a bandaid on your puncture site, you may remove it the next morning    You may shower tomorrow    No bath tubs, whirlpools or swimming for at least 3 days     Activity:      You may go back to normal activity in 24 hours    You should let pain be your guide as to the extent of your activities    Maintain any activity limitations as ordered by your provider    Do NOT drive a vehicle if you develop numbness in your arm or leg    Medicines:      You may resume all medications    Resume your Warfarin/Coumadin at your regular dose today. Follow up with your provider to have your INR rechecked    Resume your Platelet Inhibitors and Aspirin tomorrow at your regular dose    For minor pain, you may take Acetaminophen (Tylenol) or Ibuprofen (Advil)    Pain:       You may experience increased or different pain over the next 24-48 hours    For the next 48 hrs - you may use ice packs for discomfort     Call your primary care doctor if:      You have severe pain that does not improve with pain medication    You have chills or a fever greater than 101 F (38 C)    The site is red, swollen, hot or tender    New problems with your bowel or bladder    Any questions or concerns    Other Instructions:      New numbness down your leg post injection is temporary and may last for up to 6 hours. You may need assistance with activity until your leg has normal sensation.    If you are diabetic, monitor your blood sugar closely. Contact the provider who manages your diabetes to help you control your blood sugar if needed.    For Your Information:      A steroid was injected to help decrease swelling and may help to reduce pain. It may take up to 7-10 days to obtain full results.    Some patients will get lasting relief from a single injection. Others may require up to 3 injections to get results. If  you have more than one steroid injection, they should be given 2 weeks apart.    Side effects of your steroid injection are mild and will go away in 2-3 days  - Insomnia  - Heartburn  - Flushed face  - Water retention  - Increased appetite  - Increased blood sugar      If you have questions call:        [facility] Radiology Dept @ [phone]      The provider who performed your procedure was Dr. Frye.

## 2020-02-27 NOTE — PROGRESS NOTES
1132 pt not sure but thinks she took her xeralto last tiara 1800. Called j luis lyle and discussed, wishes pt to be rescheduled for noon tomoorw, to be here 1100. Pt told this and to hold her xeralto tonight. 2 friends here with her. They instructed with her, they are drivers and to be with her. Getting scheduling to reschedule pt. Pt will discharged with friends.

## 2020-02-28 ENCOUNTER — HOSPITAL ENCOUNTER (OUTPATIENT)
Dept: GENERAL RADIOLOGY | Facility: CLINIC | Age: 80
End: 2020-02-28
Attending: FAMILY MEDICINE | Admitting: RADIOLOGY
Payer: MEDICARE

## 2020-02-28 ENCOUNTER — HOSPITAL ENCOUNTER (OUTPATIENT)
Facility: CLINIC | Age: 80
Discharge: HOME | End: 2020-02-28
Attending: RADIOLOGY | Admitting: RADIOLOGY
Payer: MEDICARE

## 2020-02-28 VITALS
SYSTOLIC BLOOD PRESSURE: 117 MMHG | HEIGHT: 62 IN | HEART RATE: 55 BPM | TEMPERATURE: 97.2 F | DIASTOLIC BLOOD PRESSURE: 56 MMHG | OXYGEN SATURATION: 97 % | RESPIRATION RATE: 18 BRPM | BODY MASS INDEX: 36.8 KG/M2 | WEIGHT: 200 LBS

## 2020-02-28 PROCEDURE — 25500064 ZZH RX 255 OP 636: Performed by: FAMILY MEDICINE

## 2020-02-28 PROCEDURE — 40000863 ZZH STATISTIC RADIOLOGY XRAY, US, CT, MAR, NM

## 2020-02-28 PROCEDURE — 62323 NJX INTERLAMINAR LMBR/SAC: CPT

## 2020-02-28 PROCEDURE — 25000128 H RX IP 250 OP 636: Performed by: FAMILY MEDICINE

## 2020-02-28 PROCEDURE — 25000125 ZZHC RX 250: Performed by: FAMILY MEDICINE

## 2020-02-28 RX ORDER — DEXTROSE MONOHYDRATE 25 G/50ML
25-50 INJECTION, SOLUTION INTRAVENOUS
Status: DISCONTINUED | OUTPATIENT
Start: 2020-02-28 | End: 2020-02-28 | Stop reason: HOSPADM

## 2020-02-28 RX ORDER — IOPAMIDOL 408 MG/ML
10 INJECTION, SOLUTION INTRATHECAL ONCE
Status: COMPLETED | OUTPATIENT
Start: 2020-02-28 | End: 2020-02-28

## 2020-02-28 RX ORDER — NICOTINE POLACRILEX 4 MG
15-30 LOZENGE BUCCAL
Status: DISCONTINUED | OUTPATIENT
Start: 2020-02-28 | End: 2020-02-28 | Stop reason: HOSPADM

## 2020-02-28 RX ORDER — DEXAMETHASONE SODIUM PHOSPHATE 10 MG/ML
20 INJECTION, SOLUTION INTRAMUSCULAR; INTRAVENOUS ONCE
Status: COMPLETED | OUTPATIENT
Start: 2020-02-28 | End: 2020-02-28

## 2020-02-28 RX ORDER — LIDOCAINE HYDROCHLORIDE 10 MG/ML
30 INJECTION, SOLUTION EPIDURAL; INFILTRATION; INTRACAUDAL; PERINEURAL ONCE
Status: COMPLETED | OUTPATIENT
Start: 2020-02-28 | End: 2020-02-28

## 2020-02-28 RX ADMIN — DEXAMETHASONE SODIUM PHOSPHATE 20 MG: 10 INJECTION, SOLUTION INTRAMUSCULAR; INTRAVENOUS at 12:26

## 2020-02-28 RX ADMIN — LIDOCAINE HYDROCHLORIDE 6 ML: 10 INJECTION, SOLUTION EPIDURAL; INFILTRATION; INTRACAUDAL; PERINEURAL at 12:23

## 2020-02-28 RX ADMIN — IOPAMIDOL 1 ML: 408 INJECTION, SOLUTION INTRATHECAL at 12:25

## 2020-02-28 ASSESSMENT — MIFFLIN-ST. JEOR: SCORE: 1335.44

## 2020-02-28 NOTE — PROCEDURES
[facility]    Procedure: Right L5-S1 TFESI  Date/Time: 2/28/2020 12:32 PM  Performed by: Zion Arriola PA-C  Authorized by: Zion Arriola PA-C   IR Fellow Physician: RADHA Arriola    UNIVERSAL PROTOCOL   Site Marked: Yes  Prior Images Obtained and Reviewed:  Yes  Required items: Required blood products, implants, devices and special equipment available    Patient identity confirmed:  Verbally with patient  Patient was reevaluated immediately before administering moderate or deep sedation or anesthesia  Confirmation Checklist:  Patient's identity using two indicators, relevant allergies, procedure was appropriate and matched the consent or emergent situation and correct equipment/implants were available  Time out: Immediately prior to the procedure a time out was called    Universal Protocol: the Joint Commission Universal Protocol was followed    Preparation: Patient was prepped and draped in usual sterile fashion           ANESTHESIA    Local Anesthetic: Lidocaine 1% without epinephrine      SEDATION    Patient Sedated: No    See dictated procedure note for full details.  PROCEDURE   Patient Tolerance:  Patient tolerated the procedure well with no immediate complications  Describe Procedure: Successful L5-S1 TFESI.  Previous lumbar surgery  Length of time physician/provider present for 1:1 monitoring during sedation: 0

## 2020-02-28 NOTE — DISCHARGE INSTRUCTIONS
Steroid Injection Discharge Instructions     After you go home:      You may resume your normal diet.    Care of Puncture Site:      If you have a bandaid on your puncture site, you may remove it the next morning    You may shower tomorrow    No bath tubs, whirlpools or swimming for at least 3 days     Activity:      You may go back to normal activity in 24 hours    You should let pain be your guide as to the extent of your activities    Maintain any activity limitations as ordered by your provider    Do NOT drive a vehicle if you develop numbness in your arm or leg    Medicines:      You may resume all medications    Resume your Warfarin/Coumadin at your regular dose today. Follow up with your provider to have your INR rechecked    Resume your Platelet Inhibitors and Aspirin tomorrow at your regular dose    For minor pain, you may take Acetaminophen (Tylenol) or Ibuprofen (Advil)    Pain:       You may experience increased or different pain over the next 24-48 hours    For the next 48 hrs - you may use ice packs for discomfort     Call your primary care doctor if:      You have severe pain that does not improve with pain medication    You have chills or a fever greater than 101 F (38 C)    The site is red, swollen, hot or tender    New problems with your bowel or bladder    Any questions or concerns    Other Instructions:      New numbness down your leg post injection is temporary and may last for up to 6 hours. You may need assistance with activity until your leg has normal sensation.    If you are diabetic, monitor your blood sugar closely. Contact the provider who manages your diabetes to help you control your blood sugar if needed.    For Your Information:      A steroid was injected to help decrease swelling and may help to reduce pain. It may take up to 7-10 days to obtain full results.    Some patients will get lasting relief from a single injection. Others may require up to 3 injections to get results. If  you have more than one steroid injection, they should be given 2 weeks apart.    Side effects of your steroid injection are mild and will go away in 2-3 days  - Insomnia  - Heartburn  - Flushed face  - Water retention  - Increased appetite  - Increased blood sugar      If you have questions call:        [facility] Radiology Dept @ [phone]      The provider who performed your procedure was Belen JOSEPH

## 2020-02-28 NOTE — PROGRESS NOTES
Care Suites Discharge Nursing Note    Patient Information  Name: Betty Tee  Age: 79 year old    Discharge Education:  Discharge instructions reviewed: Yes  Additional education/resources provided: chau  Patient/patient representative verbalizes understanding: Yes  Patient discharging on new medications: No  Medication education completed: N/A    Discharge Plans:   Discharge location: home  Discharge ride contacted: Yes  Approximate discharge time: 1323    Discharge Criteria:  Discharge criteria met and vital signs stable: Yes, site CDI with bandaid. Pain at 2 per pt right low back and butt.     Patient Belongs:  Patient belongings returned to patient: Yes  Report from Ghazal pena, who had pt post procedure. Pt dressed and ready to go. Discharged via wheel chair with  by chau.    Laura Elias RN

## 2020-02-28 NOTE — PROGRESS NOTES
Care Suites Post Procedure Note    Patient Information  Name: Betty Tee  Age: 79 year old    Post Procedure  Procedure: Epidural injection  Time patient returned to Care Suites: 12:44  Concerns/abnormal assessment: None  If abnormal assessment, provider notified: N/A  Plan/Other: Discharge to home when patient meets criteria    Ghazal Mcghee RN

## 2020-02-28 NOTE — PROGRESS NOTES
Care Suites Admission Nursing Note    Patient Information  Name: Betty Tee  Age: 79 year old  Reason for admission: epidural injection  Care Suites arrival time: 1120    Patient Admission/Assessment   Pre-procedure assessment complete: Yes  If abnormal assessment/labs, provider notified: N/A  NPO: N/A  Medications held per instructions/orders: Yes last xeralto 2-26 evening  Consent: obtained  Patient oriented to room: Yes  Education/questions answered: Yes, avs and copy given  Plan/other: per orders    Discharge Planning  Accompanied by: sister erasmo  Discharge name/phone number: [phone]  Overnight post sedation caregiver: sister erasmo  Discharge location: home    Laura Elias RN   Pain at 7 right low back, butt, and leg. Pre procedure. Helped to bathroom via wheel chair.

## 2020-03-03 ENCOUNTER — APPOINTMENT (OUTPATIENT)
Dept: GENERAL RADIOLOGY | Facility: CLINIC | Age: 80
End: 2020-03-03
Attending: EMERGENCY MEDICINE
Payer: MEDICARE

## 2020-03-03 ENCOUNTER — HOSPITAL ENCOUNTER (OUTPATIENT)
Facility: CLINIC | Age: 80
Setting detail: OBSERVATION
Discharge: HOME | End: 2020-03-04
Attending: EMERGENCY MEDICINE | Admitting: FAMILY MEDICINE
Payer: MEDICARE

## 2020-03-03 DIAGNOSIS — R42 DIZZINESS: ICD-10-CM

## 2020-03-03 DIAGNOSIS — M54.16 LUMBAR BACK PAIN WITH RADICULOPATHY AFFECTING RIGHT LOWER EXTREMITY: ICD-10-CM

## 2020-03-03 DIAGNOSIS — R50.9 HYPERTHERMIA-INDUCED DEFECT: ICD-10-CM

## 2020-03-03 DIAGNOSIS — R53.1 WEAKNESS: Primary | ICD-10-CM

## 2020-03-03 LAB
ABO + RH BLD: NORMAL
ABO + RH BLD: NORMAL
ALBUMIN SERPL-MCNC: 3.2 G/DL (ref 3.4–5)
ALBUMIN UR-MCNC: 10 MG/DL
ALP SERPL-CCNC: 82 U/L (ref 40–150)
ALT SERPL W P-5'-P-CCNC: 21 U/L (ref 0–50)
ANION GAP SERPL CALCULATED.3IONS-SCNC: 7 MMOL/L (ref 3–14)
APPEARANCE UR: CLEAR
AST SERPL W P-5'-P-CCNC: 13 U/L (ref 0–45)
BASOPHILS # BLD AUTO: 0 10E9/L (ref 0–0.2)
BASOPHILS NFR BLD AUTO: 0.2 %
BILIRUB SERPL-MCNC: 1.1 MG/DL (ref 0.2–1.3)
BILIRUB UR QL STRIP: NEGATIVE
BLD GP AB SCN SERPL QL: NORMAL
BLOOD BANK CMNT PATIENT-IMP: NORMAL
BUN SERPL-MCNC: 13 MG/DL (ref 7–30)
CALCIUM SERPL-MCNC: 9.3 MG/DL (ref 8.5–10.1)
CHLORIDE SERPL-SCNC: 101 MMOL/L (ref 94–109)
CO2 SERPL-SCNC: 26 MMOL/L (ref 20–32)
COLOR UR AUTO: YELLOW
CREAT SERPL-MCNC: 0.94 MG/DL (ref 0.52–1.04)
DIFFERENTIAL METHOD BLD: ABNORMAL
EOSINOPHIL # BLD AUTO: 0 10E9/L (ref 0–0.7)
EOSINOPHIL NFR BLD AUTO: 0.1 %
ERYTHROCYTE [DISTWIDTH] IN BLOOD BY AUTOMATED COUNT: 14 % (ref 10–15)
FLUAV+FLUBV RNA SPEC QL NAA+PROBE: NEGATIVE
FLUAV+FLUBV RNA SPEC QL NAA+PROBE: NEGATIVE
GFR SERPL CREATININE-BSD FRML MDRD: 57 ML/MIN/{1.73_M2}
GLUCOSE SERPL-MCNC: 157 MG/DL (ref 70–99)
GLUCOSE UR STRIP-MCNC: NEGATIVE MG/DL
HCT VFR BLD AUTO: 41.9 % (ref 35–47)
HGB BLD-MCNC: 13.8 G/DL (ref 11.7–15.7)
HGB UR QL STRIP: ABNORMAL
IMM GRANULOCYTES # BLD: 0.1 10E9/L (ref 0–0.4)
IMM GRANULOCYTES NFR BLD: 0.9 %
INR PPP: 1.36 (ref 0.86–1.14)
KETONES UR STRIP-MCNC: NEGATIVE MG/DL
LEUKOCYTE ESTERASE UR QL STRIP: ABNORMAL
LYMPHOCYTES # BLD AUTO: 1.4 10E9/L (ref 0.8–5.3)
LYMPHOCYTES NFR BLD AUTO: 10.7 %
MCH RBC QN AUTO: 30.5 PG (ref 26.5–33)
MCHC RBC AUTO-ENTMCNC: 32.9 G/DL (ref 31.5–36.5)
MCV RBC AUTO: 93 FL (ref 78–100)
MONOCYTES # BLD AUTO: 1.4 10E9/L (ref 0–1.3)
MONOCYTES NFR BLD AUTO: 10.5 %
MUCOUS THREADS #/AREA URNS LPF: PRESENT /LPF
NEUTROPHILS # BLD AUTO: 10.5 10E9/L (ref 1.6–8.3)
NEUTROPHILS NFR BLD AUTO: 77.6 %
NITRATE UR QL: NEGATIVE
NRBC # BLD AUTO: 0 10*3/UL
NRBC BLD AUTO-RTO: 0 /100
PH UR STRIP: 6.5 PH (ref 5–7)
PLATELET # BLD AUTO: 238 10E9/L (ref 150–450)
POTASSIUM SERPL-SCNC: 3.4 MMOL/L (ref 3.4–5.3)
PROT SERPL-MCNC: 8.1 G/DL (ref 6.8–8.8)
RBC # BLD AUTO: 4.53 10E12/L (ref 3.8–5.2)
RBC #/AREA URNS AUTO: 4 /HPF (ref 0–2)
RSV RNA SPEC NAA+PROBE: NEGATIVE
SODIUM SERPL-SCNC: 134 MMOL/L (ref 133–144)
SOURCE: ABNORMAL
SP GR UR STRIP: 1.01 (ref 1–1.03)
SPECIMEN EXP DATE BLD: NORMAL
SPECIMEN SOURCE: NORMAL
SQUAMOUS #/AREA URNS AUTO: <1 /HPF (ref 0–1)
TRANS CELLS #/AREA URNS HPF: <1 /HPF (ref 0–1)
UROBILINOGEN UR STRIP-MCNC: NORMAL MG/DL (ref 0–2)
WBC # BLD AUTO: 13.5 10E9/L (ref 4–11)
WBC #/AREA URNS AUTO: 6 /HPF (ref 0–5)

## 2020-03-03 PROCEDURE — 71046 X-RAY EXAM CHEST 2 VIEWS: CPT

## 2020-03-03 PROCEDURE — 25800030 ZZH RX IP 258 OP 636: Performed by: EMERGENCY MEDICINE

## 2020-03-03 PROCEDURE — 86850 RBC ANTIBODY SCREEN: CPT | Performed by: EMERGENCY MEDICINE

## 2020-03-03 PROCEDURE — 85025 COMPLETE CBC W/AUTO DIFF WBC: CPT | Performed by: EMERGENCY MEDICINE

## 2020-03-03 PROCEDURE — 96360 HYDRATION IV INFUSION INIT: CPT

## 2020-03-03 PROCEDURE — 87631 RESP VIRUS 3-5 TARGETS: CPT | Performed by: EMERGENCY MEDICINE

## 2020-03-03 PROCEDURE — 87086 URINE CULTURE/COLONY COUNT: CPT | Performed by: NURSE PRACTITIONER

## 2020-03-03 PROCEDURE — 86901 BLOOD TYPING SEROLOGIC RH(D): CPT | Performed by: EMERGENCY MEDICINE

## 2020-03-03 PROCEDURE — 99285 EMERGENCY DEPT VISIT HI MDM: CPT | Mod: 25

## 2020-03-03 PROCEDURE — 80053 COMPREHEN METABOLIC PANEL: CPT | Performed by: EMERGENCY MEDICINE

## 2020-03-03 PROCEDURE — 81001 URINALYSIS AUTO W/SCOPE: CPT | Performed by: EMERGENCY MEDICINE

## 2020-03-03 PROCEDURE — 85610 PROTHROMBIN TIME: CPT | Performed by: EMERGENCY MEDICINE

## 2020-03-03 PROCEDURE — 25000132 ZZH RX MED GY IP 250 OP 250 PS 637: Mod: GY | Performed by: EMERGENCY MEDICINE

## 2020-03-03 PROCEDURE — 99285 EMERGENCY DEPT VISIT HI MDM: CPT | Mod: Z6 | Performed by: EMERGENCY MEDICINE

## 2020-03-03 PROCEDURE — 86900 BLOOD TYPING SEROLOGIC ABO: CPT | Performed by: EMERGENCY MEDICINE

## 2020-03-03 PROCEDURE — 84484 ASSAY OF TROPONIN QUANT: CPT | Performed by: EMERGENCY MEDICINE

## 2020-03-03 RX ORDER — ACETAMINOPHEN 325 MG/1
650 TABLET ORAL EVERY 4 HOURS PRN
Status: DISCONTINUED | OUTPATIENT
Start: 2020-03-03 | End: 2020-03-04 | Stop reason: HOSPADM

## 2020-03-03 RX ADMIN — SODIUM CHLORIDE 500 ML: 9 INJECTION, SOLUTION INTRAVENOUS at 21:15

## 2020-03-03 RX ADMIN — ACETAMINOPHEN 650 MG: 325 TABLET, FILM COATED ORAL at 21:49

## 2020-03-03 ASSESSMENT — ENCOUNTER SYMPTOMS
CONFUSION: 0
DIARRHEA: 0
MYALGIAS: 0
ABDOMINAL PAIN: 0
EYE REDNESS: 0
HEADACHES: 0
VOMITING: 1
DIFFICULTY URINATING: 0
ANAL BLEEDING: 1
FEVER: 0
NAUSEA: 1
SHORTNESS OF BREATH: 0
WEAKNESS: 1
HEMATURIA: 0
ARTHRALGIAS: 0
DYSURIA: 0
NECK STIFFNESS: 0
COLOR CHANGE: 0

## 2020-03-03 ASSESSMENT — MIFFLIN-ST. JEOR: SCORE: 1398.94

## 2020-03-04 ENCOUNTER — APPOINTMENT (OUTPATIENT)
Dept: GENERAL RADIOLOGY | Facility: CLINIC | Age: 80
End: 2020-03-04
Attending: NURSE PRACTITIONER
Payer: MEDICARE

## 2020-03-04 ENCOUNTER — APPOINTMENT (OUTPATIENT)
Dept: PHYSICAL THERAPY | Facility: CLINIC | Age: 80
End: 2020-03-04
Attending: NURSE PRACTITIONER
Payer: MEDICARE

## 2020-03-04 VITALS
OXYGEN SATURATION: 98 % | TEMPERATURE: 98.3 F | DIASTOLIC BLOOD PRESSURE: 73 MMHG | WEIGHT: 199.2 LBS | HEART RATE: 70 BPM | HEIGHT: 66 IN | BODY MASS INDEX: 32.02 KG/M2 | SYSTOLIC BLOOD PRESSURE: 106 MMHG | RESPIRATION RATE: 16 BRPM

## 2020-03-04 PROBLEM — R53.1 WEAKNESS: Status: ACTIVE | Noted: 2020-03-04

## 2020-03-04 LAB
ANION GAP SERPL CALCULATED.3IONS-SCNC: 10 MMOL/L (ref 3–14)
BASOPHILS # BLD AUTO: 0 10E9/L (ref 0–0.2)
BASOPHILS NFR BLD AUTO: 0.2 %
BUN SERPL-MCNC: 14 MG/DL (ref 7–30)
CALCIUM SERPL-MCNC: 9 MG/DL (ref 8.5–10.1)
CHLORIDE SERPL-SCNC: 105 MMOL/L (ref 94–109)
CO2 SERPL-SCNC: 24 MMOL/L (ref 20–32)
CREAT SERPL-MCNC: 0.92 MG/DL (ref 0.52–1.04)
DIFFERENTIAL METHOD BLD: ABNORMAL
EOSINOPHIL # BLD AUTO: 0.1 10E9/L (ref 0–0.7)
EOSINOPHIL NFR BLD AUTO: 0.8 %
ERYTHROCYTE [DISTWIDTH] IN BLOOD BY AUTOMATED COUNT: 13.9 % (ref 10–15)
ERYTHROCYTE [DISTWIDTH] IN BLOOD BY AUTOMATED COUNT: 14.1 % (ref 10–15)
GFR SERPL CREATININE-BSD FRML MDRD: 59 ML/MIN/{1.73_M2}
GLUCOSE BLDC GLUCOMTR-MCNC: 113 MG/DL (ref 70–99)
GLUCOSE BLDC GLUCOMTR-MCNC: 134 MG/DL (ref 70–99)
GLUCOSE BLDC GLUCOMTR-MCNC: 171 MG/DL (ref 70–99)
GLUCOSE BLDC GLUCOMTR-MCNC: 178 MG/DL (ref 70–99)
GLUCOSE SERPL-MCNC: 130 MG/DL (ref 70–99)
HCT VFR BLD AUTO: 38.4 % (ref 35–47)
HCT VFR BLD AUTO: 41.3 % (ref 35–47)
HEMOCCULT STL QL IA: POSITIVE
HGB BLD-MCNC: 12.3 G/DL (ref 11.7–15.7)
HGB BLD-MCNC: 12.6 G/DL (ref 11.7–15.7)
HGB BLD-MCNC: 13.2 G/DL (ref 11.7–15.7)
IMM GRANULOCYTES # BLD: 0.1 10E9/L (ref 0–0.4)
IMM GRANULOCYTES NFR BLD: 0.7 %
INTERPRETATION ECG - MUSE: NORMAL
LYMPHOCYTES # BLD AUTO: 0.7 10E9/L (ref 0.8–5.3)
LYMPHOCYTES NFR BLD AUTO: 7.1 %
MCH RBC QN AUTO: 30.1 PG (ref 26.5–33)
MCH RBC QN AUTO: 30.3 PG (ref 26.5–33)
MCHC RBC AUTO-ENTMCNC: 32 G/DL (ref 31.5–36.5)
MCHC RBC AUTO-ENTMCNC: 32 G/DL (ref 31.5–36.5)
MCV RBC AUTO: 94 FL (ref 78–100)
MCV RBC AUTO: 95 FL (ref 78–100)
MONOCYTES # BLD AUTO: 0.8 10E9/L (ref 0–1.3)
MONOCYTES NFR BLD AUTO: 8.3 %
NEUTROPHILS # BLD AUTO: 8.4 10E9/L (ref 1.6–8.3)
NEUTROPHILS NFR BLD AUTO: 82.9 %
NRBC # BLD AUTO: 0 10*3/UL
NRBC BLD AUTO-RTO: 0 /100
PLATELET # BLD AUTO: 203 10E9/L (ref 150–450)
PLATELET # BLD AUTO: 206 10E9/L (ref 150–450)
POTASSIUM SERPL-SCNC: 3 MMOL/L (ref 3.4–5.3)
RBC # BLD AUTO: 4.06 10E12/L (ref 3.8–5.2)
RBC # BLD AUTO: 4.38 10E12/L (ref 3.8–5.2)
SODIUM SERPL-SCNC: 138 MMOL/L (ref 133–144)
TROPONIN I SERPL-MCNC: <0.015 UG/L (ref 0–0.04)
TROPONIN I SERPL-MCNC: <0.015 UG/L (ref 0–0.04)
WBC # BLD AUTO: 10.1 10E9/L (ref 4–11)
WBC # BLD AUTO: 10.6 10E9/L (ref 4–11)

## 2020-03-04 PROCEDURE — 36415 COLL VENOUS BLD VENIPUNCTURE: CPT | Performed by: NURSE PRACTITIONER

## 2020-03-04 PROCEDURE — 85025 COMPLETE CBC W/AUTO DIFF WBC: CPT | Performed by: NURSE PRACTITIONER

## 2020-03-04 PROCEDURE — 25000132 ZZH RX MED GY IP 250 OP 250 PS 637: Mod: GY | Performed by: NURSE PRACTITIONER

## 2020-03-04 PROCEDURE — 82274 ASSAY TEST FOR BLOOD FECAL: CPT | Performed by: NURSE PRACTITIONER

## 2020-03-04 PROCEDURE — 84132 ASSAY OF SERUM POTASSIUM: CPT | Performed by: NURSE PRACTITIONER

## 2020-03-04 PROCEDURE — 85027 COMPLETE CBC AUTOMATED: CPT | Performed by: NURSE PRACTITIONER

## 2020-03-04 PROCEDURE — 97161 PT EVAL LOW COMPLEX 20 MIN: CPT | Mod: GP

## 2020-03-04 PROCEDURE — 00000146 ZZHCL STATISTIC GLUCOSE BY METER IP

## 2020-03-04 PROCEDURE — G0378 HOSPITAL OBSERVATION PER HR: HCPCS

## 2020-03-04 PROCEDURE — 99236 HOSP IP/OBS SAME DATE HI 85: CPT | Mod: Z6 | Performed by: EMERGENCY MEDICINE

## 2020-03-04 PROCEDURE — 84484 ASSAY OF TROPONIN QUANT: CPT | Performed by: NURSE PRACTITIONER

## 2020-03-04 PROCEDURE — 96361 HYDRATE IV INFUSION ADD-ON: CPT

## 2020-03-04 PROCEDURE — 96372 THER/PROPH/DIAG INJ SC/IM: CPT

## 2020-03-04 PROCEDURE — 85018 HEMOGLOBIN: CPT | Mod: 91 | Performed by: NURSE PRACTITIONER

## 2020-03-04 PROCEDURE — 93010 ELECTROCARDIOGRAM REPORT: CPT | Performed by: INTERNAL MEDICINE

## 2020-03-04 PROCEDURE — 73502 X-RAY EXAM HIP UNI 2-3 VIEWS: CPT

## 2020-03-04 PROCEDURE — 80048 BASIC METABOLIC PNL TOTAL CA: CPT | Performed by: NURSE PRACTITIONER

## 2020-03-04 PROCEDURE — 25800030 ZZH RX IP 258 OP 636: Performed by: NURSE PRACTITIONER

## 2020-03-04 PROCEDURE — 25000131 ZZH RX MED GY IP 250 OP 636 PS 637: Mod: GY | Performed by: NURSE PRACTITIONER

## 2020-03-04 RX ORDER — HYDROCODONE BITARTRATE AND ACETAMINOPHEN 5; 325 MG/1; MG/1
1 TABLET ORAL EVERY 6 HOURS PRN
Qty: 12 TABLET | Refills: 0 | Status: SHIPPED | OUTPATIENT
Start: 2020-03-04 | End: 2020-03-17

## 2020-03-04 RX ORDER — HYDROCODONE BITARTRATE AND ACETAMINOPHEN 5; 325 MG/1; MG/1
1 TABLET ORAL EVERY 6 HOURS PRN
Status: DISCONTINUED | OUTPATIENT
Start: 2020-03-04 | End: 2020-03-04 | Stop reason: HOSPADM

## 2020-03-04 RX ORDER — POTASSIUM CL/LIDO/0.9 % NACL 10MEQ/0.1L
10 INTRAVENOUS SOLUTION, PIGGYBACK (ML) INTRAVENOUS
Status: DISCONTINUED | OUTPATIENT
Start: 2020-03-04 | End: 2020-03-04 | Stop reason: HOSPADM

## 2020-03-04 RX ORDER — ONDANSETRON 2 MG/ML
4 INJECTION INTRAMUSCULAR; INTRAVENOUS EVERY 6 HOURS PRN
Status: DISCONTINUED | OUTPATIENT
Start: 2020-03-04 | End: 2020-03-04 | Stop reason: HOSPADM

## 2020-03-04 RX ORDER — SPIRONOLACTONE 25 MG/1
50 TABLET ORAL DAILY
Status: DISCONTINUED | OUTPATIENT
Start: 2020-03-04 | End: 2020-03-04 | Stop reason: HOSPADM

## 2020-03-04 RX ORDER — ACETAMINOPHEN 500 MG
1000 TABLET ORAL EVERY 8 HOURS PRN
Status: ON HOLD | COMMUNITY
Start: 2020-03-04 | End: 2023-06-01

## 2020-03-04 RX ORDER — TORSEMIDE 20 MG/1
40 TABLET ORAL DAILY
Status: DISCONTINUED | OUTPATIENT
Start: 2020-03-04 | End: 2020-03-04 | Stop reason: HOSPADM

## 2020-03-04 RX ORDER — HYDROXYCHLOROQUINE SULFATE 200 MG/1
400 TABLET, FILM COATED ORAL DAILY
Status: DISCONTINUED | OUTPATIENT
Start: 2020-03-04 | End: 2020-03-04 | Stop reason: HOSPADM

## 2020-03-04 RX ORDER — LIDOCAINE 4 G/G
2 PATCH TOPICAL EVERY 24 HOURS
Status: DISCONTINUED | OUTPATIENT
Start: 2020-03-04 | End: 2020-03-04 | Stop reason: HOSPADM

## 2020-03-04 RX ORDER — POTASSIUM CHLORIDE 750 MG/1
20-40 TABLET, EXTENDED RELEASE ORAL
Status: DISCONTINUED | OUTPATIENT
Start: 2020-03-04 | End: 2020-03-04 | Stop reason: HOSPADM

## 2020-03-04 RX ORDER — POTASSIUM CHLORIDE 29.8 MG/ML
20 INJECTION INTRAVENOUS
Status: DISCONTINUED | OUTPATIENT
Start: 2020-03-04 | End: 2020-03-04 | Stop reason: HOSPADM

## 2020-03-04 RX ORDER — POTASSIUM CHLORIDE 750 MG/1
40 TABLET, EXTENDED RELEASE ORAL DAILY
Status: DISCONTINUED | OUTPATIENT
Start: 2020-03-04 | End: 2020-03-04 | Stop reason: HOSPADM

## 2020-03-04 RX ORDER — ACETAMINOPHEN 325 MG/1
650 TABLET ORAL EVERY 4 HOURS PRN
Status: DISCONTINUED | OUTPATIENT
Start: 2020-03-04 | End: 2020-03-04 | Stop reason: HOSPADM

## 2020-03-04 RX ORDER — POTASSIUM CHLORIDE 1.5 G/1.58G
20-40 POWDER, FOR SOLUTION ORAL
Status: DISCONTINUED | OUTPATIENT
Start: 2020-03-04 | End: 2020-03-04 | Stop reason: HOSPADM

## 2020-03-04 RX ORDER — LORATADINE 10 MG/1
10 TABLET ORAL DAILY
Status: DISCONTINUED | OUTPATIENT
Start: 2020-03-04 | End: 2020-03-04 | Stop reason: HOSPADM

## 2020-03-04 RX ORDER — ACETAMINOPHEN 650 MG/1
650 SUPPOSITORY RECTAL EVERY 4 HOURS PRN
Status: DISCONTINUED | OUTPATIENT
Start: 2020-03-04 | End: 2020-03-04 | Stop reason: HOSPADM

## 2020-03-04 RX ORDER — NICOTINE POLACRILEX 4 MG
15-30 LOZENGE BUCCAL
Status: DISCONTINUED | OUTPATIENT
Start: 2020-03-04 | End: 2020-03-04 | Stop reason: HOSPADM

## 2020-03-04 RX ORDER — DEXTROSE MONOHYDRATE 25 G/50ML
25-50 INJECTION, SOLUTION INTRAVENOUS
Status: DISCONTINUED | OUTPATIENT
Start: 2020-03-04 | End: 2020-03-04 | Stop reason: HOSPADM

## 2020-03-04 RX ORDER — NALOXONE HYDROCHLORIDE 0.4 MG/ML
.1-.4 INJECTION, SOLUTION INTRAMUSCULAR; INTRAVENOUS; SUBCUTANEOUS
Status: DISCONTINUED | OUTPATIENT
Start: 2020-03-04 | End: 2020-03-04 | Stop reason: HOSPADM

## 2020-03-04 RX ORDER — POTASSIUM CHLORIDE 750 MG/1
20 TABLET, EXTENDED RELEASE ORAL EVERY EVENING
Status: DISCONTINUED | OUTPATIENT
Start: 2020-03-04 | End: 2020-03-04 | Stop reason: HOSPADM

## 2020-03-04 RX ORDER — POTASSIUM CHLORIDE 7.45 MG/ML
10 INJECTION INTRAVENOUS
Status: DISCONTINUED | OUTPATIENT
Start: 2020-03-04 | End: 2020-03-04 | Stop reason: HOSPADM

## 2020-03-04 RX ORDER — ESCITALOPRAM OXALATE 20 MG/1
20 TABLET ORAL DAILY
Status: DISCONTINUED | OUTPATIENT
Start: 2020-03-04 | End: 2020-03-04 | Stop reason: HOSPADM

## 2020-03-04 RX ORDER — PREDNISONE 5 MG/1
5 TABLET ORAL DAILY
Status: DISCONTINUED | OUTPATIENT
Start: 2020-03-04 | End: 2020-03-04 | Stop reason: HOSPADM

## 2020-03-04 RX ORDER — ONDANSETRON 4 MG/1
4 TABLET, ORALLY DISINTEGRATING ORAL EVERY 6 HOURS PRN
Status: DISCONTINUED | OUTPATIENT
Start: 2020-03-04 | End: 2020-03-04 | Stop reason: HOSPADM

## 2020-03-04 RX ORDER — AZITHROMYCIN 250 MG/1
250 TABLET, FILM COATED ORAL DAILY
Status: DISCONTINUED | OUTPATIENT
Start: 2020-03-04 | End: 2020-03-04 | Stop reason: HOSPADM

## 2020-03-04 RX ORDER — LIDOCAINE 40 MG/G
CREAM TOPICAL
Status: DISCONTINUED | OUTPATIENT
Start: 2020-03-04 | End: 2020-03-04 | Stop reason: HOSPADM

## 2020-03-04 RX ORDER — CEVIMELINE HYDROCHLORIDE 30 MG/1
30 CAPSULE ORAL 3 TIMES DAILY
Status: DISCONTINUED | OUTPATIENT
Start: 2020-03-04 | End: 2020-03-04 | Stop reason: HOSPADM

## 2020-03-04 RX ORDER — GABAPENTIN 300 MG/1
300 CAPSULE ORAL 2 TIMES DAILY
Status: DISCONTINUED | OUTPATIENT
Start: 2020-03-04 | End: 2020-03-04 | Stop reason: HOSPADM

## 2020-03-04 RX ORDER — MONTELUKAST SODIUM 10 MG/1
10 TABLET ORAL AT BEDTIME
Status: DISCONTINUED | OUTPATIENT
Start: 2020-03-04 | End: 2020-03-04 | Stop reason: HOSPADM

## 2020-03-04 RX ADMIN — LORATADINE 10 MG: 10 TABLET ORAL at 08:29

## 2020-03-04 RX ADMIN — TIZANIDINE 4 MG: 4 TABLET ORAL at 07:52

## 2020-03-04 RX ADMIN — HYDROXYCHLOROQUINE SULFATE 400 MG: 200 TABLET, FILM COATED ORAL at 08:28

## 2020-03-04 RX ADMIN — SPIRONOLACTONE 50 MG: 25 TABLET ORAL at 08:29

## 2020-03-04 RX ADMIN — ESCITALOPRAM OXALATE 20 MG: 20 TABLET ORAL at 08:28

## 2020-03-04 RX ADMIN — TORSEMIDE 40 MG: 20 TABLET ORAL at 08:28

## 2020-03-04 RX ADMIN — POTASSIUM CHLORIDE 20 MEQ: 750 TABLET, EXTENDED RELEASE ORAL at 12:13

## 2020-03-04 RX ADMIN — METOPROLOL SUCCINATE 62.5 MG: 25 TABLET, FILM COATED, EXTENDED RELEASE ORAL at 08:28

## 2020-03-04 RX ADMIN — HYDROCODONE BITARTRATE AND ACETAMINOPHEN 1 TABLET: 5; 325 TABLET ORAL at 05:14

## 2020-03-04 RX ADMIN — TORSEMIDE 30 MG: 20 TABLET ORAL at 14:37

## 2020-03-04 RX ADMIN — GABAPENTIN 300 MG: 300 CAPSULE ORAL at 08:28

## 2020-03-04 RX ADMIN — PREDNISONE 5 MG: 5 TABLET ORAL at 08:28

## 2020-03-04 RX ADMIN — ATORVASTATIN CALCIUM 5 MG: 10 TABLET, FILM COATED ORAL at 08:28

## 2020-03-04 RX ADMIN — INSULIN GLARGINE 22 UNITS: 100 INJECTION, SOLUTION SUBCUTANEOUS at 08:23

## 2020-03-04 RX ADMIN — SODIUM CHLORIDE 500 ML: 9 INJECTION, SOLUTION INTRAVENOUS at 10:46

## 2020-03-04 RX ADMIN — LIDOCAINE 2 PATCH: 560 PATCH PERCUTANEOUS; TOPICAL; TRANSDERMAL at 03:36

## 2020-03-04 RX ADMIN — MONTELUKAST 10 MG: 10 TABLET, FILM COATED ORAL at 03:35

## 2020-03-04 RX ADMIN — POTASSIUM CHLORIDE 40 MEQ: 750 TABLET, EXTENDED RELEASE ORAL at 08:27

## 2020-03-04 RX ADMIN — INSULIN ASPART 1 UNITS: 100 INJECTION, SOLUTION INTRAVENOUS; SUBCUTANEOUS at 12:14

## 2020-03-04 RX ADMIN — AZITHROMYCIN MONOHYDRATE 250 MG: 250 TABLET ORAL at 08:28

## 2020-03-04 RX ADMIN — POTASSIUM CHLORIDE 40 MEQ: 750 TABLET, EXTENDED RELEASE ORAL at 09:41

## 2020-03-04 RX ADMIN — HYDROCODONE BITARTRATE AND ACETAMINOPHEN 1 TABLET: 5; 325 TABLET ORAL at 11:14

## 2020-03-04 ASSESSMENT — PAIN DESCRIPTION - DESCRIPTORS: DESCRIPTORS: ACHING;DISCOMFORT

## 2020-03-04 ASSESSMENT — MIFFLIN-ST. JEOR: SCORE: 1395.32

## 2020-03-04 NOTE — PROGRESS NOTES
12:44 PM The patient was seen and examined by me and the case was discussed with the PAUL. We are in agreement with the assessment and plan.   Past Medical History:   Diagnosis Date     Alcohol abuse, in remission      Allergic rhinitis, cause unspecified     allegra helps when she takes it     Antiplatelet or antithrombotic long-term use      Atrial fibrillation (H)     in hosp in 11/11 after surgery w/ fluid overload     Cardiomegaly     LVH on stress echo- cardiac w/u at [facility] ER- neg CT scan for PE, neg stress echo in 8/06     Chest pain, unspecified      Disorder of bone and cartilage, unspecified     osteopenia (had been on prempro), improved on 6/06 dexa, stable dexa 11/10     Diverticulosis of colon (without mention of hemorrhage)     last episode yrs ago     Essential hypertension, benign      Follicular bronchiolitis (H)     associated with Sjogrens, dx by chest CT showing mosaic attenuation and air trapping     Gastro-oesophageal reflux disease      ILD (interstitial lung disease) (H)     associated with Sjogrens, also has mildly elevated IgG4, first noted on chest CT 2015 (mild changes) and also has small airways disease; ILD improved on follow up chest CT 2018.     Insomnia, unspecified     weaned off clonazepam     Irregular heart beat      Lumbago 7/09    MRI with DJD, now seeing Dr. Cain for sciatic sx's     Major depressive disorder, recurrent episode, moderate (H)      Obstructive sleep apnea      Osteoarthrosis, unspecified whether generalized or localized, unspecified site      Sjogren's syndrome (H)     + RG and SSA and lip bx     Sleep apnea      Tobacco use disorder     chantix in 9/07, started again in 6/08, working     Social History     Socioeconomic History     Marital status: Single     Spouse name: Not on file     Number of children: 0     Years of education: Ed Spec De     Highest education level: Not on file   Occupational History     Occupation: Professor     Employer: [employer]     Comment: [employer]- Education   Social Needs     Financial resource strain: Not on file     Food insecurity:     Worry: Not on file     Inability: Not on file     Transportation needs:     Medical: Not on file     Non-medical: Not on file   Tobacco Use     Smoking status: Former Smoker     Packs/day: 0.50     Years: 10.00     Pack years: 5.00     Types: Cigarettes     Last attempt to quit: 2011     Years since quittin.5     Smokeless tobacco: Never Used     Tobacco comment:  ppd   Substance and Sexual Activity     Alcohol use: No     Alcohol/week: 0.0 standard drinks     Comment: In recovery beginning      Drug use: No     Sexual activity: Never   Lifestyle     Physical activity:     Days per week: Not on file     Minutes per session: Not on file     Stress: Not on file   Relationships     Social connections:     Talks on phone: Not on file     Gets together: Not on file     Attends [employer] service: Not on file     Active member of club or organization: Not on file     Attends meetings of clubs or organizations: Not on file     Relationship status: Not on file     Intimate partner violence:     Fear of current or ex partner: Not on file     Emotionally abused: Not on file     Physically abused: Not on file     Forced sexual activity: Not on file   Other Topics Concern     Parent/sibling w/ CABG, MI or angioplasty before 65F 55M? Not Asked   Social History Narrative                 This is a 79-year-old female with past history of Sjogren's syndrome currently on prednisone 5 mg daily previously on 10 mg for years.  She is also on Xarelto for atrial fibrillation.  Prior to coming to the emergency department she had a fall at home and has had pain in the right pelvis with weightbearing and she reports that she just does not feel well.  She has had mucoid diarrhea with tinges of blood but not a significant GI bleed.  She has a history of subtotal colectomy for  diverticular abscess.  She also had recent lumbar steroid injection for chronic lumbar radiculopathy.  Work-up in the emergency department and here did not show any etiology for her symptoms, specifically no source of infection she was mildly hypokalemic at 3.0 which is being replaced.  She was orthostatic and likely dehydrated from the diarrhea.  Plan is for rehydration PT OT will image the pelvis to ensure no rami fracture and if she is doing well she can discharge home today.    Physical exam:  General: Awake alert no distress  Cardiac: Regular rate and rhythm no murmurs rubs gallops  Respiratory: Lungs are clear  Extremities: Strength in the right lower extremity is 4 of 5 and symmetric to the left.  She does complain of groin pain with weightbearing on the right    Assessment and plan: We will get x-ray of right hip and pelvis to rule out pubic ramus fracture.  Replace potassium ensure ability to oral rehydrate and assessed by PT for safety of discharge home particularly with respect to ambulation   anticipate discharge today with outpatient clinic follow-up with primary care provider and rheumatologist.

## 2020-03-04 NOTE — PHARMACY-ADMISSION MEDICATION HISTORY
Admission medication history interview status for the 3/3/2020 admission is complete. See Epic admission navigator for allergy information, pharmacy, prior to admission medications and immunization status.     Medication history interview sources:  patient and her healthcare agent (another sister living in the same house)    Changes made to PTA medication list (reason)    Added: None    Deleted: acyclovir tabs, cyclobenzaprine, tizanidine  - 15 tabs of acyclovir 400 mg tabs were dispensed on 12/3/19 for a 5-day supply and patient finished the whole course.  - Patient reported having no cyclobenzaprine and tizanidine left.    Changed: insulin glargine, insulin aspart, ketoconazole cream  - Insulin glargine dose was changed from 25 units subcutaneously daily to 22 units subcutaneously daily.  - Insulin aspart dose was changed from 12 units subcutaneously TID with meals to 6 units subcutaneously TID with meals.  - Ketoconazole cream was changed from BID to BID PRN.    Additional medication history information (including reliability of information, actions taken by pharmacist):  - Patient is a somewhat reliable historian of her medications. Patient's healthcare agent has a list that tracks patient's medications and is a reliable historian of patient's medications.  - Patient was hesitant to respond when asked if she has been taking her gabapentin at home. Patient reported not knowing what gabapentin is used for and that she was having dizziness with the medication. However, patient did confirm that the home regimen of gabapentin 300 mg TID was correct.  - Patient takes 0.5 mg semaglutide subcutaneously once a week on Monday.  - Patient reported taking trazodone 50 mg almost daily recently for sleep. Trazodone was prescribed PRN on her med list.     Prior to Admission medications    Medication Sig Last Dose Taking? Auth Provider   acetaminophen (TYLENOL) 500 MG tablet Take 500 mg by mouth nightly as needed   at PRN Yes  Reported, Patient   albuterol (PROAIR HFA, PROVENTIL HFA, VENTOLIN HFA) 108 (90 BASE) MCG/ACT inhaler Inhale 2 puffs into the lungs every 6 hours 3/3/2020 at Unknown time Yes Meño Walsh MD   atorvastatin (LIPITOR) 10 MG tablet Take 0.5 tablets (5 mg) by mouth daily 3/3/2020 at Unknown time Yes Ilene Tristan MD   azithromycin (ZITHROMAX) 250 MG tablet Take 1 tablet (250 mg) by mouth daily 3/3/2020 at Unknown time Yes Maryjane Tillman MD   CEVIMELINE 30 MG PO capsule TAKE 1 CAPSULE (30 MG) BY MOUTH 3 TIMES DAILY 3/3/2020 at Unknown time Yes Zulma Lopez MD   escitalopram (LEXAPRO) 20 MG tablet TAKE 1 TABLET BY MOUTH EVERY DAY 3/3/2020 at Unknown time Yes Ilene Tristan MD   fluticasone-vilanterol (BREO ELLIPTA) 100-25 MCG/INH inhaler Inhale 1 puff into the lungs daily 3/3/2020 at Unknown Yes Maryjane Tillman MD   HYDROcodone-acetaminophen (NORCO) 5-325 MG tablet Take 1 tablet by mouth every 6 hours as needed for severe pain  at PRN Yes William El DO   hydroxychloroquine (PLAQUENIL) 200 MG tablet Take 2 tablets (400 mg) by mouth daily Annual Plaquenil toxicity eye screening required. 3/3/2020 at Unknown time Yes Zulma Lopez MD   insulin glargine (BASAGLAR KWIKPEN) 100 UNIT/ML pen INJECT 25 UNITS SUBCUTANEOUS DAILY (TO REPLACE LANTUS)  Patient taking differently: Inject 22 Units Subcutaneous daily (to replace lantus) 3/3/2020 at Unknown time Yes Ilene Tristan MD   lidocaine (LIDODERM) 5 % patch Apply patch to painful area for up to 12 h within a 24 h period.  Remove after 12 hours.  at PRN Yes Ilene Tristan MD   loratadine (CLARITIN) 10 MG tablet TAKE 1 TABLET BY MOUTH EVERY DAY 3/3/2020 at Unknown time Yes Ilene Tristan MD   metoprolol succinate ER (TOPROL-XL) 25 MG 24 hr tablet Take 0.5 tablets (12.5 mg) by mouth 2 times daily Take 50 mg by mouth in combination with 12.5 for a total of 62.5 twice a day 3/3/2020 at Unknown time Yes Kiesha Elias, APRN  CNP   metoprolol succinate ER (TOPROL-XL) 50 MG 24 hr tablet Take 50 mg by mouth in combination with 12.5 for a total of 62.5 twice a day 3/3/2020 at Unknown time Yes Kiesha Elias APRN CNP   montelukast (SINGULAIR) 10 MG tablet TAKE 1 TABLET BY MOUTH EVERYDAY AT BEDTIME 3/3/2020 at Unknown time Yes Ilene Tristan MD   NOVOLOG FLEXPEN 100 UNIT/ML soln INJECT 12 UNITS SUBCUTANEOUS 3 TIMES DAILY (WITH MEALS)  Patient taking differently: Inject 6 Units Subcutaneous 3 times daily (with meals)  3/3/2020 at Unknown time Yes Ilene Tristan MD   potassium chloride ER (KLOR-CON) 20 MEQ CR tablet Take 2 tablets (40 mEq) by mouth every morning AND 1 tablet (20 mEq) every evening. 3/3/2020 at Unknown time Yes Radha Rosa MD   predniSONE (DELTASONE) 5 MG tablet Take 1 tablet (5 mg) by mouth daily 3/3/2020 at Unknown time Yes Zulma Lopez MD   rivaroxaban ANTICOAGULANT (XARELTO ANTICOAGULANT) 20 MG PO TABS tablet Take 1 tablet (20 mg) by mouth daily (with dinner) 3/3/2020 at Unknown time Yes Radha Rosa MD   Semaglutide,0.25 or 0.5MG/DOS, (OZEMPIC, 0.25 OR 0.5 MG/DOSE,) 2 MG/1.5ML SOPN Inject 0.5 mg Subcutaneous once a week 3/2/2020 Yes Ilene Tristan MD   spironolactone (ALDACTONE) 25 MG tablet Take 2 tablets (50 mg) by mouth daily 3/3/2020 at Unknown time Yes Radha Rosa MD   torsemide (DEMADEX) 10 MG tablet Take one tab (10 mg) with one 20 mg tab to = 30 mg daily in afternoon. 3/3/2020 at Unknown time Yes Sandy See MD   torsemide (DEMADEX) 20 MG tablet Take 2 tabs (40 mg) in am daily 3/3/2020 at Unknown time Yes Radha Rosa MD   traZODone (DESYREL) 50 MG tablet TAKE 0.5-1.5 TABLETS (25-75 MG) BY MOUTH NIGHTLY AS NEEDED FOR SLEEP 3/3/2020 at PRN Yes Ilene Tristan MD   vitamin D3 (CHOLECALCIFEROL) 2000 units (50 mcg) tablet Take 1 tablet (2,000 Units) by mouth daily 3/3/2020 at Unknown time Yes Zulma Lopez MD    acyclovir (ZOVIRAX) 5 % external ointment Apply topically 6 times daily As needed for outbreaks Unknown at PRN  Ilene Tristan MD   fluticasone (FLONASE) 50 MCG/ACT nasal spray Spray 1-2 sprays into both nostrils daily as needed for allergies Unknown at PRN  Maryjane Tillman MD   gabapentin (NEURONTIN) 300 MG capsule Take 1 capsule (300 mg) by mouth 3 times daily Unknown at Unknown time  William El DO   ketoconazole (NIZORAL) 2 % external cream Apply topically 2 times daily  Patient taking differently: Apply topically 2 times daily as needed  Unknown at PRN  Ilene Tristan MD   order for DME Equipment being ordered: Oxygen  Pulsed oxygen portable tank  Rate: 4LNC  Diagnosis: ILD  Duration: Lifetime -99   Kiesha Elias APRN CNP       Medication history completed by:     Juanjose RodríguezD IV student  March 4, 2020

## 2020-03-04 NOTE — PLAN OF CARE
"Observation Goals  -diagnostic tests and consults completed and resulted: pending, will recheck K+, X-ray of hip to be done.  -vital signs normal or at patient baseline Yes   -tolerating oral intake to maintain hydration Yes   -returns to baseline functional status Not met   -safe disposition plan has been identified Not met     /73 (BP Location: Right arm)   Pulse 70   Temp 98.4  F (36.9  C) (Oral)   Resp 16   Ht 1.676 m (5' 6\")   Wt 90.4 kg (199 lb 3.2 oz)   SpO2 99%   BMI 32.15 kg/m       "

## 2020-03-04 NOTE — ED PROVIDER NOTES
[place] [facility]ERG[facility] DEPARTMENT ([facility])  3/03/20  History     Chief Complaint   Patient presents with     Generalized Weakness     The history is provided by the patient and medical records.     Betty Tee is a 79 year old female with a complicated past medical history including Sjogren's syndrome, osteoarthritis, ANGELICA, ILD, GERD, follicular bronchiolitis, diverticulosis, cardiomegaly, A. fib, DVTs, HFpEF, stage III CKD, and type 2 diabetes mellitus who presents to the Emergency Department for evaluation of generalized weakness.  Per chart review, the patient was recently seen at [facility] on 2/27/2020 and underwent a successful L5-S1 TFESI.  Patient was noted to have tolerated the procedure well with no immediate complications.    Patient presents to the Emergency Department today for evaluation of increased generalized weakness.  Patient states she was brought in by friends after weakness after a fall on Saturday (2/29/2020), and recent rectal bleeding.  Patient states she did not hit her head on this fall, and was ultimately able to get herself up.  Patient is currently on Xarelto as well.  Patient states she has been able to walk since her mechanical fall, but still notes being fairly weak.  Patient states she has also had bright red blood from her rectum.  She first noticed this this afternoon in the toilet, and since arrival to the ED more has been present.  Patient states she is also had 2 episodes of yellow emesis today.  Patient has had temperatures around 100 degrees today as well.  She denies any dysuria, hematuria, or increased frequency.  Patient denies any recent travel, and states she did get her flu shot this year.  She denies any chest pain, or shortness of breath.  No myalgias, or arthralgias.    Past Medical History:   Diagnosis Date     Alcohol abuse, in remission      Allergic rhinitis, cause unspecified     allegra helps when she takes it     Antiplatelet or  antithrombotic long-term use      Atrial fibrillation (H)     in hosp in 11/11 after surgery w/ fluid overload     Cardiomegaly     LVH on stress echo- cardiac w/u at [facility] ER- neg CT scan for PE, neg stress echo in 8/06     Chest pain, unspecified      Disorder of bone and cartilage, unspecified     osteopenia (had been on prempro), improved on 6/06 dexa, stable dexa 11/10     Diverticulosis of colon (without mention of hemorrhage)     last episode yrs ago     Essential hypertension, benign      Follicular bronchiolitis (H)     associated with Sjogrens, dx by chest CT showing mosaic attenuation and air trapping     Gastro-oesophageal reflux disease      ILD (interstitial lung disease) (H)     associated with Sjogrens, also has mildly elevated IgG4, first noted on chest CT 2015 (mild changes) and also has small airways disease; ILD improved on follow up chest CT 2018.     Insomnia, unspecified     weaned off clonazepam     Irregular heart beat      Lumbago 7/09    MRI with DJD, now seeing Dr. Cain for sciatic sx's     Major depressive disorder, recurrent episode, moderate (H)      Obstructive sleep apnea      Osteoarthrosis, unspecified whether generalized or localized, unspecified site      Sjogren's syndrome (H)     + RG and SSA and lip bx     Sleep apnea      Tobacco use disorder     chantix in 9/07, started again in 6/08, working       Past Surgical History:   Procedure Laterality Date     BACK SURGERY  1962     BIOPSY BREAST  9/27/02    Biopsy Left Breast     C APPENDECTOMY  1970's?     C NONSPECIFIC PROCEDURE  11/05    exploratory abd lap, adhesions, resolved RLQ pain, diverticulitis episodes     CARDIAC SURGERY       CHOLECYSTECTOMY  1990's?     COLECTOMY LEFT  11/7/2011    Procedure:COLECTOMY LEFT; Laparoscopic mobilization of splenic flexture, sigmoid colectomy, coloprotoscopy, loop illeostomy; Surgeon:CK CASTANEDA; Location:[facility] OR     HYSTERECTOMY TOTAL ABDOMINAL, BILATERAL SALPINGO-OOPHORECTOMY,  COMBINED  11/7/2011    Procedure:COMBINED HYSTERECTOMY TOTAL ABDOMINAL, BILATERAL SALPINGO-OOPHORECTOMY; total abdominal hysterectomy, bilateral salpingo-oophorectomy; Surgeon:ALETA MANUEL; Location:[facility] OR     INSERT STENT URETER  11/7/2011    Procedure:INSERT STENT URETER; Placement of Bilateral Ureteral Stents ; Surgeon:PRANEETH BRYANT; Location:[facility] OR     SIGMOIDOSCOPY FLEXIBLE  11/3/2011    Procedure:SIGMOIDOSCOPY FLEXIBLE; Flexible Sigmoidoscopy; Surgeon:CK CASTANEDA; Location:[facility] OR     TAKEDOWN ILEOSTOMY  2/1/2012    Procedure:TAKEDOWN ILEOSTOMY; Takedown Loop Ileostomy ; Surgeon:CK CASTANEDA; Location:[facility] OR       Family History   Problem Relation Age of Onset     C.A.D. Mother 63        MI- first at age 63     Heart Disease Mother      Hypertension Mother      Cerebrovascular Disease Mother      Hyperlipidemia Mother      Alcohol/Drug Father      Alzheimer Disease Father      Dementia Father      Hypertension Father      Hyperlipidemia Father      Diabetes Sister      C.A.D. Sister 52        Minor MI- age 50's     Heart Disease Sister      Hypertension Sister      Hypertension Sister      Hypertension Brother      Cancer - colorectal Sister 48        Late 40's early 50's     Prostate Cancer Brother 74        Dx'd age 74     Gastrointestinal Disease Sister         Diverticulitis     Gastrointestinal Disease Brother         Diverticulitis     Lipids Sister      Lipids Sister      Parkinsonism Brother      Diabetes Sister      Heart Disease Sister         CHF     Cancer Sister         lung, smoker     Substance Abuse Sister      Substance Abuse Brother      Asthma Sister      Cancer Sister      Breast Cancer Daughter      Prostate Cancer Brother      Hyperlipidemia Brother      Diabetes Other      Hypertension Other        Social History     Tobacco Use     Smoking status: Former Smoker     Packs/day: 0.50     Years: 10.00     Pack years: 5.00     Types: Cigarettes     Last attempt to quit: 8/1/2011      Years since quittin.5     Smokeless tobacco: Never Used     Tobacco comment: 1/2 ppd   Substance Use Topics     Alcohol use: No     Alcohol/week: 0.0 standard drinks     Comment: In recovery beginning        Current Facility-Administered Medications   Medication     0.9% sodium chloride BOLUS     acetaminophen (TYLENOL) tablet 650 mg     Current Outpatient Medications   Medication     acetaminophen (TYLENOL) 500 MG tablet     acyclovir (ZOVIRAX) 400 MG tablet     acyclovir (ZOVIRAX) 5 % external ointment     albuterol (PROAIR HFA, PROVENTIL HFA, VENTOLIN HFA) 108 (90 BASE) MCG/ACT inhaler     atorvastatin (LIPITOR) 10 MG tablet     azithromycin (ZITHROMAX) 250 MG tablet     CEVIMELINE 30 MG PO capsule     cyclobenzaprine 5 MG PO tablet     escitalopram (LEXAPRO) 20 MG tablet     fluticasone (FLONASE) 50 MCG/ACT nasal spray     fluticasone-vilanterol (BREO ELLIPTA) 100-25 MCG/INH inhaler     gabapentin (NEURONTIN) 300 MG capsule     HYDROcodone-acetaminophen (NORCO) 5-325 MG tablet     hydroxychloroquine (PLAQUENIL) 200 MG tablet     insulin glargine (BASAGLAR KWIKPEN) 100 UNIT/ML pen     ketoconazole (NIZORAL) 2 % external cream     lidocaine (LIDODERM) 5 % patch     loratadine (CLARITIN) 10 MG tablet     metoprolol succinate ER (TOPROL-XL) 25 MG 24 hr tablet     metoprolol succinate ER (TOPROL-XL) 50 MG 24 hr tablet     montelukast (SINGULAIR) 10 MG tablet     NOVOLOG FLEXPEN 100 UNIT/ML soln     order for DME     potassium chloride ER (KLOR-CON) 20 MEQ CR tablet     predniSONE (DELTASONE) 5 MG tablet     rivaroxaban ANTICOAGULANT (XARELTO ANTICOAGULANT) 20 MG PO TABS tablet     Semaglutide,0.25 or 0.5MG/DOS, (OZEMPIC, 0.25 OR 0.5 MG/DOSE,) 2 MG/1.5ML SOPN     spironolactone (ALDACTONE) 25 MG tablet     tiZANidine (ZANAFLEX) 2 MG tablet     tiZANidine (ZANAFLEX) 4 MG tablet     torsemide (DEMADEX) 10 MG tablet     torsemide (DEMADEX) 20 MG tablet     traZODone (DESYREL) 50 MG tablet     vitamin D3  "(CHOLECALCIFEROL) 2000 units (50 mcg) tablet        Allergies   Allergen Reactions     Amoxicillin-Pot Clavulanate      Augmentin Nausea and Vomiting     Codeine Nausea and Vomiting     Codeine      PN: LW Reaction: HIVES     Penicillins Nausea and Vomiting     PN: LW Reaction: GI Upset     Phenobarbital Itching     Phenobarbital      Seasonal Allergies      I have reviewed the Medications, Allergies, Past Medical and Surgical History, and Social History in the Epic system.    Review of Systems   Constitutional: Negative for fever.   HENT: Negative for congestion.    Eyes: Negative for redness.   Respiratory: Negative for shortness of breath.    Cardiovascular: Negative for chest pain.   Gastrointestinal: Positive for anal bleeding, nausea and vomiting. Negative for abdominal pain and diarrhea.   Genitourinary: Negative for difficulty urinating, dysuria and hematuria.   Musculoskeletal: Negative for arthralgias, myalgias and neck stiffness.   Skin: Negative for color change.   Neurological: Positive for weakness. Negative for syncope and headaches.   Psychiatric/Behavioral: Negative for confusion.       Physical Exam   BP: (!) 141/69  Heart Rate: 66  Temp: 99.3  F (37.4  C)  Resp: 18  Height: 167.6 cm (5' 6\")  Weight: 90.7 kg (200 lb)  SpO2: 96 %      Physical Exam  Vitals signs and nursing note reviewed.   Constitutional:       General: She is not in acute distress.     Appearance: She is not diaphoretic.   HENT:      Head: Normocephalic and atraumatic.      Right Ear: Tympanic membrane normal.      Left Ear: Tympanic membrane normal.      Nose: No congestion or rhinorrhea.      Mouth/Throat:      Mouth: Mucous membranes are moist.      Pharynx: No oropharyngeal exudate or posterior oropharyngeal erythema.   Eyes:      Conjunctiva/sclera: Conjunctivae normal.      Pupils: Pupils are equal, round, and reactive to light.   Neck:      Musculoskeletal: Normal range of motion and neck supple.   Cardiovascular:      Rate " and Rhythm: Normal rate and regular rhythm.   Pulmonary:      Effort: Pulmonary effort is normal. No respiratory distress.      Breath sounds: No stridor. No wheezing or rales.   Abdominal:      General: There is no distension.      Tenderness: There is no abdominal tenderness. There is no guarding.   Musculoskeletal: Normal range of motion.   Skin:     General: Skin is warm and dry.   Neurological:      Mental Status: She is alert and oriented to person, place, and time.   Psychiatric:         Behavior: Behavior normal.         Thought Content: Thought content normal.         ED Course   9:07 PM  The patient was seen and examined by Rigoberto Syed MD in Room ED32.    Medications   acetaminophen (TYLENOL) tablet 650 mg (has no administration in time range)   0.9% sodium chloride BOLUS (has no administration in time range)         Procedures                           Labs Ordered and Resulted from Time of ED Arrival Up to the Time of Departure from the ED   CBC WITH PLATELETS DIFFERENTIAL - Abnormal; Notable for the following components:       Result Value    WBC 13.5 (*)     Absolute Neutrophil 10.5 (*)     Absolute Monocytes 1.4 (*)     All other components within normal limits   INR - Abnormal; Notable for the following components:    INR 1.36 (*)     All other components within normal limits   COMPREHENSIVE METABOLIC PANEL - Abnormal; Notable for the following components:    Glucose 157 (*)     GFR Estimate 57 (*)     Albumin 3.2 (*)     All other components within normal limits   PERIPHERAL IV CATHETER   CARDIAC CONTINUOUS MONITORING   ABO/RH TYPE AND SCREEN     Results for orders placed or performed during the hospital encounter of 03/03/20 (from the past 24 hour(s))   CBC with platelets differential   Result Value Ref Range    WBC 13.5 (H) 4.0 - 11.0 10e9/L    RBC Count 4.53 3.8 - 5.2 10e12/L    Hemoglobin 13.8 11.7 - 15.7 g/dL    Hematocrit 41.9 35.0 - 47.0 %    MCV 93 78 - 100 fl    MCH 30.5 26.5 - 33.0 pg     MCHC 32.9 31.5 - 36.5 g/dL    RDW 14.0 10.0 - 15.0 %    Platelet Count 238 150 - 450 10e9/L    Diff Method Automated Method     % Neutrophils 77.6 %    % Lymphocytes 10.7 %    % Monocytes 10.5 %    % Eosinophils 0.1 %    % Basophils 0.2 %    % Immature Granulocytes 0.9 %    Nucleated RBCs 0 0 /100    Absolute Neutrophil 10.5 (H) 1.6 - 8.3 10e9/L    Absolute Lymphocytes 1.4 0.8 - 5.3 10e9/L    Absolute Monocytes 1.4 (H) 0.0 - 1.3 10e9/L    Absolute Eosinophils 0.0 0.0 - 0.7 10e9/L    Absolute Basophils 0.0 0.0 - 0.2 10e9/L    Abs Immature Granulocytes 0.1 0 - 0.4 10e9/L    Absolute Nucleated RBC 0.0    ABO/Rh type and screen   Result Value Ref Range    ABO O     RH(D) Pos     Antibody Screen Neg     Test Valid Only At          [facility]    Specimen Expires 03/06/2020    INR   Result Value Ref Range    INR 1.36 (H) 0.86 - 1.14   Comprehensive metabolic panel   Result Value Ref Range    Sodium 134 133 - 144 mmol/L    Potassium 3.4 3.4 - 5.3 mmol/L    Chloride 101 94 - 109 mmol/L    Carbon Dioxide 26 20 - 32 mmol/L    Anion Gap 7 3 - 14 mmol/L    Glucose 157 (H) 70 - 99 mg/dL    Urea Nitrogen 13 7 - 30 mg/dL    Creatinine 0.94 0.52 - 1.04 mg/dL    GFR Estimate 57 (L) >60 mL/min/[1.73_m2]    GFR Estimate If Black 66 >60 mL/min/[1.73_m2]    Calcium 9.3 8.5 - 10.1 mg/dL    Bilirubin Total 1.1 0.2 - 1.3 mg/dL    Albumin 3.2 (L) 3.4 - 5.0 g/dL    Protein Total 8.1 6.8 - 8.8 g/dL    Alkaline Phosphatase 82 40 - 150 U/L    ALT 21 0 - 50 U/L    AST 13 0 - 45 U/L     *Note: Due to a large number of results and/or encounters for the requested time period, some results have not been displayed. A complete set of results can be found in Results Review.            Assessments & Plan (with Medical Decision Making)   79-year-old female with a history of Sjogren's who presents with general malaise and weakness along with bright red blood per rectum, and low-grade fevers.  Her examination  is benign with no evidence of any serious bacterial infection or injury from her fall.  She had a epidural steroid injection in the lumbar spine, and I do not see any complication from this either.  She reported some bright red blood per rectum and hemoglobin is stable at 13.8.  She reported some low-grade fever but had no specific localizing symptoms.  Influenza was negative and urinalysis showed no convincing evidence of urinary tract infection.  Chest x-ray was normal.  Unclear if this is a viral syndrome causing general malaise and weakness.  Will avoid antibiotics at this time and continue to monitor.  EKG and troponin are pending.  Regarding her GI bleed, we will continue to monitor with serial hemoglobins.     I have reviewed the nursing notes.    I have reviewed the findings, diagnosis, plan and need for follow up with the patient.    New Prescriptions    No medications on file       Final diagnoses:   None   I, Willian Luna, am serving as a trained medical scribe to document services personally performed by Rigoberto Syed MD, based on the provider's statements to me.      IRigoberto MD, was physically present and have reviewed and verified the accuracy of this note documented by Willian Luna.     3/3/2020   [facility], EMERGENCY DEPARTMENT     Rigoberto Syed MD  03/04/20 0147

## 2020-03-04 NOTE — CONSULTS
Brief Social Work Note    SW consult seen and acknowledged for discharge planning. Per PT recommendations, pt is able to return home with assistance from friends and home health care. No SW needs identified at this time. Please re-consult if further SW needs arise.    GINNA Randall, LGSW  6D Adult Acute Care SW  Ph:[phone]  Pager [phone]  Unit [phone]

## 2020-03-04 NOTE — DISCHARGE SUMMARY
"ED Observation Discharge Summary    Betty Tee   MRN# [id number]  Age: 79 year old   YOB: 1940            Date of Admission: 03/3/2020    Date of Discharge: 03/4/2020  Admitting Physician: Dr. William Madrid MD  Discharge Physician: Dr. Sunil MD  NP/PA: Dominique Solorio CNP    DISCHARGE DIAGNOSIS:     ##Generalized Weakness  ##Nasuea/Vomiting  ##Hematochezia  ##Acute on Chronic Low back and Right hip Pain   ##HFpEF  ##Follicular bronchiolitis  ##Sjogrens  ##DM2  ##Atrial Fibrillation  ##Insomnia    INTERVAL HISTORY: VSS, afebrile. No further bleeding or loose stools. Up with walker. No dizziness. Her friend and pam care agent is present and states she looks much better and is at baseline. The patient and her friend feel she is ready to discharge to home. Offered longer observation stay but they did not think this was needed.     PHYSICAL EXAM:   Blood pressure 106/73, pulse 70, temperature 98.3  F (36.8  C), temperature source Oral, resp. rate 16, height 1.676 m (5' 6\"), weight 90.4 kg (199 lb 3.2 oz), SpO2 98 %, not currently breastfeeding.     GENERAL: Alert and oriented x 3. NAD.   HEENT: Anicteric sclera. Mucous membranes moist.   CV: Irregularly irregular. No murmurs appreciated.   RESPIRATORY: Effort normal. Lungs CTAB with no wheezing, rales, rhonchi.   GI: Abdomen soft and non distended with normoactive bowel sounds present in all quadrants. No tenderness, rebound, guarding.   NEUROLOGICAL: No focal deficits. Moves all extremities.    EXTREMITIES: No peripheral edema. Intact bilateral pedal pulses.   SKIN: No jaundice. No rashes.     PROCEDURES AND IMAGING:   Results for orders placed or performed during the hospital encounter of 03/03/20 (from the past 24 hour(s))   CBC with platelets differential   Result Value Ref Range    WBC 13.5 (H) 4.0 - 11.0 10e9/L    RBC Count 4.53 3.8 - 5.2 10e12/L    Hemoglobin 13.8 11.7 - 15.7 g/dL    Hematocrit 41.9 35.0 - 47.0 %    MCV 93 78 - 100 fl "    MCH 30.5 26.5 - 33.0 pg    MCHC 32.9 31.5 - 36.5 g/dL    RDW 14.0 10.0 - 15.0 %    Platelet Count 238 150 - 450 10e9/L    Diff Method Automated Method     % Neutrophils 77.6 %    % Lymphocytes 10.7 %    % Monocytes 10.5 %    % Eosinophils 0.1 %    % Basophils 0.2 %    % Immature Granulocytes 0.9 %    Nucleated RBCs 0 0 /100    Absolute Neutrophil 10.5 (H) 1.6 - 8.3 10e9/L    Absolute Lymphocytes 1.4 0.8 - 5.3 10e9/L    Absolute Monocytes 1.4 (H) 0.0 - 1.3 10e9/L    Absolute Eosinophils 0.0 0.0 - 0.7 10e9/L    Absolute Basophils 0.0 0.0 - 0.2 10e9/L    Abs Immature Granulocytes 0.1 0 - 0.4 10e9/L    Absolute Nucleated RBC 0.0    ABO/Rh type and screen   Result Value Ref Range    ABO O     RH(D) Pos     Antibody Screen Neg     Test Valid Only At          [facility]    Specimen Expires 03/06/2020    INR   Result Value Ref Range    INR 1.36 (H) 0.86 - 1.14   Comprehensive metabolic panel   Result Value Ref Range    Sodium 134 133 - 144 mmol/L    Potassium 3.4 3.4 - 5.3 mmol/L    Chloride 101 94 - 109 mmol/L    Carbon Dioxide 26 20 - 32 mmol/L    Anion Gap 7 3 - 14 mmol/L    Glucose 157 (H) 70 - 99 mg/dL    Urea Nitrogen 13 7 - 30 mg/dL    Creatinine 0.94 0.52 - 1.04 mg/dL    GFR Estimate 57 (L) >60 mL/min/[1.73_m2]    GFR Estimate If Black 66 >60 mL/min/[1.73_m2]    Calcium 9.3 8.5 - 10.1 mg/dL    Bilirubin Total 1.1 0.2 - 1.3 mg/dL    Albumin 3.2 (L) 3.4 - 5.0 g/dL    Protein Total 8.1 6.8 - 8.8 g/dL    Alkaline Phosphatase 82 40 - 150 U/L    ALT 21 0 - 50 U/L    AST 13 0 - 45 U/L   Troponin I   Result Value Ref Range    Troponin I ES <0.015 0.000 - 0.045 ug/L   Influenza A and B and RSV PCR   Result Value Ref Range    Specimen Description Swab     Influenza A PCR Negative NEG^Negative    Influenza B PCR Negative NEG^Negative    Resp Syncytial Virus Negative NEG^Negative   UA with Microscopic reflex to Culture   Result Value Ref Range    Color Urine Yellow     Appearance  Urine Clear     Glucose Urine Negative NEG^Negative mg/dL    Bilirubin Urine Negative NEG^Negative    Ketones Urine Negative NEG^Negative mg/dL    Specific Gravity Urine 1.008 1.003 - 1.035    Blood Urine Trace (A) NEG^Negative    pH Urine 6.5 5.0 - 7.0 pH    Protein Albumin Urine 10 (A) NEG^Negative mg/dL    Urobilinogen mg/dL Normal 0.0 - 2.0 mg/dL    Nitrite Urine Negative NEG^Negative    Leukocyte Esterase Urine Moderate (A) NEG^Negative    Source Midstream Urine     WBC Urine 6 (H) 0 - 5 /HPF    RBC Urine 4 (H) 0 - 2 /HPF    Squamous Epithelial /HPF Urine <1 0 - 1 /HPF    Transitional Epi <1 0 - 1 /HPF    Mucous Urine Present (A) NEG^Negative /LPF   Urine Culture Aerobic Bacterial   Result Value Ref Range    Specimen Description Midstream Urine     Special Requests Specimen received in preservative     Culture Micro PENDING    Chest XR,  PA & LAT    Narrative    EXAM: XR CHEST 2 VW  LOCATION: [facility]  DATE/TIME: 3/3/2020 10:55 PM    INDICATION: Cough. Weakness.  COMPARISON: None.      Impression    IMPRESSION: Heart is not enlarged. Aorta is atherosclerotic. Small amount of biapical pleural thickening. No pneumothorax. Minimal amount of bilateral lower lung atelectasis. Upper lungs clear. Surgical clips right upper quadrant of abdomen.   Social Work IP Consult    Narrative    Anastasia Leyva MSW     3/4/2020  9:58 AM  Brief Social Work Note    SW consult seen and acknowledged for discharge planning. Per PT   recommendations, pt is able to return home with assistance from   friends and home health care. No SW needs identified at this   time. Please re-consult if further SW needs arise.    GINNA Randall, [facility]  6D Adult Acute Care SW  Ph:[phone]  Pager [phone]  Unit [phone]     Glucose by meter   Result Value Ref Range    Glucose 113 (H) 70 - 99 mg/dL   Glucose by meter   Result Value Ref Range    Glucose 171 (H) 70 - 99 mg/dL   Fecal colorectal cancer screen FIT   Result  Value Ref Range    Occult Blood Scn FIT Positive (A) NEG^Negative   Hemoglobin   Result Value Ref Range    Hemoglobin 12.6 11.7 - 15.7 g/dL   EKG 12-lead, tracing only   Result Value Ref Range    Interpretation ECG Click View Image link to view waveform and result    CBC with platelets   Result Value Ref Range    WBC 10.6 4.0 - 11.0 10e9/L    RBC Count 4.38 3.8 - 5.2 10e12/L    Hemoglobin 13.2 11.7 - 15.7 g/dL    Hematocrit 41.3 35.0 - 47.0 %    MCV 94 78 - 100 fl    MCH 30.1 26.5 - 33.0 pg    MCHC 32.0 31.5 - 36.5 g/dL    RDW 13.9 10.0 - 15.0 %    Platelet Count 203 150 - 450 10e9/L   Basic metabolic panel   Result Value Ref Range    Sodium 138 133 - 144 mmol/L    Potassium 3.0 (L) 3.4 - 5.3 mmol/L    Chloride 105 94 - 109 mmol/L    Carbon Dioxide 24 20 - 32 mmol/L    Anion Gap 10 3 - 14 mmol/L    Glucose 130 (H) 70 - 99 mg/dL    Urea Nitrogen 14 7 - 30 mg/dL    Creatinine 0.92 0.52 - 1.04 mg/dL    GFR Estimate 59 (L) >60 mL/min/[1.73_m2]    GFR Estimate If Black 68 >60 mL/min/[1.73_m2]    Calcium 9.0 8.5 - 10.1 mg/dL   Troponin I   Result Value Ref Range    Troponin I ES <0.015 0.000 - 0.045 ug/L   Glucose by meter   Result Value Ref Range    Glucose 134 (H) 70 - 99 mg/dL   Glucose by meter   Result Value Ref Range    Glucose 178 (H) 70 - 99 mg/dL   CBC with platelets differential   Result Value Ref Range    WBC 10.1 4.0 - 11.0 10e9/L    RBC Count 4.06 3.8 - 5.2 10e12/L    Hemoglobin 12.3 11.7 - 15.7 g/dL    Hematocrit 38.4 35.0 - 47.0 %    MCV 95 78 - 100 fl    MCH 30.3 26.5 - 33.0 pg    MCHC 32.0 31.5 - 36.5 g/dL    RDW 14.1 10.0 - 15.0 %    Platelet Count 206 150 - 450 10e9/L    Diff Method Automated Method     % Neutrophils 82.9 %    % Lymphocytes 7.1 %    % Monocytes 8.3 %    % Eosinophils 0.8 %    % Basophils 0.2 %    % Immature Granulocytes 0.7 %    Nucleated RBCs 0 0 /100    Absolute Neutrophil 8.4 (H) 1.6 - 8.3 10e9/L    Absolute Lymphocytes 0.7 (L) 0.8 - 5.3 10e9/L    Absolute Monocytes 0.8 0.0 - 1.3  10e9/L    Absolute Eosinophils 0.1 0.0 - 0.7 10e9/L    Absolute Basophils 0.0 0.0 - 0.2 10e9/L    Abs Immature Granulocytes 0.1 0 - 0.4 10e9/L    Absolute Nucleated RBC 0.0    XR Pelvis w Hip Right G/E 2 Views    Narrative    Exam: Single frontal view of both hips AP and frog-leg lateral view of  the right hip dated 3/4/2020.    COMPARISON: Radiographs dated 7/12/2018.    CLINICAL HISTORY: Fall with hip pain.    FINDINGS: Single frontal view of both hips and AP and frog leg lateral  view of the right hip was obtained. Degenerative disease in the  visualized lower lumbar spine. Osteoarthrosis in both hip joints,  greater in the right hip joint with joint space narrowing. No  displaced fractures. Mild irregularity at the right greater  trochanter, likely related to enthesophytes.      Impression    IMPRESSION: Osteoarthrosis of both hips, greater in the right hip  without evidence of a displaced fracture. If there is high clinical  suspicion for a nondisplaced fracture, or patient is unable to bear  weight, MRI is more sensitive in excluding a nondisplaced fracture.    FAITH ESTRELLA MD     *Note: Due to a large number of results and/or encounters for the requested time period, some results have not been displayed. A complete set of results can be found in Results Review.     DISCHARGE MEDICATIONS:   Current Discharge Medication List      CONTINUE these medications which have CHANGED    Details   acetaminophen (TYLENOL) 500 MG tablet Take 1 tablet (500 mg) by mouth nightly as needed    Associated Diagnoses: Dizziness      HYDROcodone-acetaminophen (NORCO) 5-325 MG tablet Take 1 tablet by mouth every 6 hours as needed for severe pain  Qty: 12 tablet, Refills: 0    Associated Diagnoses: Lumbar back pain with radiculopathy affecting right lower extremity         CONTINUE these medications which have NOT CHANGED    Details   albuterol (PROAIR HFA, PROVENTIL HFA, VENTOLIN HFA) 108 (90 BASE) MCG/ACT inhaler Inhale 2 puffs  into the lungs every 6 hours  Qty: 1 Inhaler, Refills: 3    Associated Diagnoses: ILD (interstitial lung disease) (H)      atorvastatin (LIPITOR) 10 MG tablet Take 0.5 tablets (5 mg) by mouth daily  Qty: 45 tablet, Refills: 0    Comments: Please fill upon patient request.  Associated Diagnoses: Hyperlipidemia LDL goal <100      azithromycin (ZITHROMAX) 250 MG tablet Take 1 tablet (250 mg) by mouth daily  Qty: 30 tablet, Refills: 11    Associated Diagnoses: Follicular bronchiolitis (H)      CEVIMELINE 30 MG PO capsule TAKE 1 CAPSULE (30 MG) BY MOUTH 3 TIMES DAILY  Qty: 270 capsule, Refills: 2    Associated Diagnoses: Sjogren's syndrome with lung involvement (H)      escitalopram (LEXAPRO) 20 MG tablet TAKE 1 TABLET BY MOUTH EVERY DAY  Qty: 90 tablet, Refills: 1    Associated Diagnoses: Major depressive disorder, recurrent episode, moderate (H)      fluticasone-vilanterol (BREO ELLIPTA) 100-25 MCG/INH inhaler Inhale 1 puff into the lungs daily  Qty: 1 Inhaler, Refills: 11    Associated Diagnoses: Follicular bronchiolitis (H)      hydroxychloroquine (PLAQUENIL) 200 MG tablet Take 2 tablets (400 mg) by mouth daily Annual Plaquenil toxicity eye screening required.  Qty: 180 tablet, Refills: 1    Associated Diagnoses: Sjogren's syndrome with lung involvement (H)      insulin glargine (BASAGLAR KWIKPEN) 100 UNIT/ML pen INJECT 25 UNITS SUBCUTANEOUS DAILY (TO REPLACE LANTUS)  Qty: 15 mL, Refills: 1    Associated Diagnoses: Type 2 diabetes mellitus with hyperglycemia, with long-term current use of insulin (H)      lidocaine (LIDODERM) 5 % patch Apply patch to painful area for up to 12 h within a 24 h period.  Remove after 12 hours.  Qty: 30 patch, Refills: 5    Associated Diagnoses: Sacral back pain      loratadine (CLARITIN) 10 MG tablet TAKE 1 TABLET BY MOUTH EVERY DAY  Qty: 90 tablet, Refills: 1    Associated Diagnoses: Seasonal allergic rhinitis due to other allergic trigger      !! metoprolol succinate ER (TOPROL-XL) 25  MG 24 hr tablet Take 0.5 tablets (12.5 mg) by mouth 2 times daily Take 50 mg by mouth in combination with 12.5 for a total of 62.5 twice a day  Qty: 90 tablet, Refills: 3    Associated Diagnoses: (HFpEF) heart failure with preserved ejection fraction (H); Persistent atrial fibrillation      !! metoprolol succinate ER (TOPROL-XL) 50 MG 24 hr tablet Take 50 mg by mouth in combination with 12.5 for a total of 62.5 twice a day  Qty: 180 tablet, Refills: 3    Comments: Updated with correct quantity - should fill 180 for a 90 day supply  Associated Diagnoses: Atrial fibrillation with controlled ventricular response (H)      montelukast (SINGULAIR) 10 MG tablet TAKE 1 TABLET BY MOUTH EVERYDAY AT BEDTIME  Qty: 90 tablet, Refills: 0    Associated Diagnoses: Sjogren's syndrome with lung involvement (H); ILD (interstitial lung disease) (H)      NOVOLOG FLEXPEN 100 UNIT/ML soln INJECT 12 UNITS SUBCUTANEOUS 3 TIMES DAILY (WITH MEALS)  Qty: 15 mL, Refills: 1    Associated Diagnoses: Type 2 diabetes mellitus with other specified complication (H)      potassium chloride ER (KLOR-CON) 20 MEQ CR tablet Take 2 tablets (40 mEq) by mouth every morning AND 1 tablet (20 mEq) every evening.  Qty: 270 tablet, Refills: 3    Associated Diagnoses: (HFpEF) heart failure with preserved ejection fraction (H)      predniSONE (DELTASONE) 5 MG tablet Take 1 tablet (5 mg) by mouth daily  Qty: 90 tablet, Refills: 1    Associated Diagnoses: Sjogren's syndrome with lung involvement (H)      rivaroxaban ANTICOAGULANT (XARELTO ANTICOAGULANT) 20 MG PO TABS tablet Take 1 tablet (20 mg) by mouth daily (with dinner)  Qty: 90 tablet, Refills: 2    Comments: DX Code I50.30  .  Associated Diagnoses: (HFpEF) heart failure with preserved ejection fraction (H); Permanent atrial fibrillation      Semaglutide,0.25 or 0.5MG/DOS, (OZEMPIC, 0.25 OR 0.5 MG/DOSE,) 2 MG/1.5ML SOPN Inject 0.5 mg Subcutaneous once a week  Qty: 4.5 mL, Refills: 1    Associated Diagnoses: Type  2 diabetes mellitus without complication, without long-term current use of insulin (H)      spironolactone (ALDACTONE) 25 MG tablet Take 2 tablets (50 mg) by mouth daily  Qty: 180 tablet, Refills: 3    Associated Diagnoses: Chronic diastolic congestive heart failure (H)      !! torsemide (DEMADEX) 10 MG tablet Take one tab (10 mg) with one 20 mg tab to = 30 mg daily in afternoon.  Qty: 90 tablet, Refills: 3    Comments: Pt will take 40 mg (2 0f her 20 mg tabs) in am and 30 mg (one 10 and one 20mg) every afternoon.  Pt has been instructed.  Associated Diagnoses: Acute on chronic combined systolic and diastolic heart failure (H)      !! torsemide (DEMADEX) 20 MG tablet Take 2 tabs (40 mg) in am daily  Qty: 105 tablet, Refills: 10    Comments: 40 mg daily in am; 30 mg (one 20mg tab &10 mg tab in pm daily  Associated Diagnoses: (HFpEF) heart failure with preserved ejection fraction (H)      traZODone (DESYREL) 50 MG tablet TAKE 0.5-1.5 TABLETS (25-75 MG) BY MOUTH NIGHTLY AS NEEDED FOR SLEEP  Qty: 135 tablet, Refills: 0    Associated Diagnoses: Insomnia due to medical condition      vitamin D3 (CHOLECALCIFEROL) 2000 units (50 mcg) tablet Take 1 tablet (2,000 Units) by mouth daily  Qty: 90 tablet, Refills: 3    Associated Diagnoses: Vitamin D deficiency      acyclovir (ZOVIRAX) 5 % external ointment Apply topically 6 times daily As needed for outbreaks  Qty: 15 g, Refills: 3    Associated Diagnoses: Recurrent cold sores      fluticasone (FLONASE) 50 MCG/ACT nasal spray Spray 1-2 sprays into both nostrils daily as needed for allergies  Qty: 18.2 mL, Refills: 11    Associated Diagnoses: Seasonal allergic rhinitis due to pollen      gabapentin (NEURONTIN) 300 MG capsule Take 1 capsule (300 mg) by mouth 3 times daily  Qty: 90 capsule, Refills: 0    Associated Diagnoses: Lumbar back pain with radiculopathy affecting right lower extremity      ketoconazole (NIZORAL) 2 % external cream Apply topically 2 times daily  Qty: 60 g,  "Refills: 1    Associated Diagnoses: Cutaneous candidiasis      order for DME Equipment being ordered: Oxygen  Pulsed oxygen portable tank  Rate: 4LNC  Diagnosis: ILD  Duration: Lifetime -99  Qty: 1 Device, Refills: 1    Associated Diagnoses: ILD (interstitial lung disease) (H)       !! - Potential duplicate medications found. Please discuss with provider.      STOP taking these medications       tiZANidine (ZANAFLEX) 4 MG tablet Comments:   Reason for Stopping:                 CONSULTATIONS:   Consultation during this admission received from:  PT  Pharmacy Intern  CC    BRIEF HISTORY OF PRESENT ILLNESS:   (Adopted from admission H&P).  \"Betty Tee is a 79 year old female with a complicated past medical history including Sjogren's syndrome, osteoarthritis, ANGELICA, ILD, GERD, follicular bronchiolitis, diverticulosis, cardiomegaly, A. fib, DVTs, HFpEF, stage III CKD, and type 2 diabetes mellitus who presents to the Emergency Department for evaluation of generalized weakness.  Per chart review, the patient was recently seen at [facility] on 2/27/2020 and underwent a successful L5-S1 TFESI.  Patient was noted to have tolerated the procedure well with no immediate complications.     Patient presents to the Emergency Department today for evaluation of increased generalized weakness.  Patient states she was brought in by friends after weakness after a fall on Saturday (2/29/2020), and recent rectal bleeding.  Patient states she did not hit her head on this fall, and was ultimately able to get herself up.  Patient is currently on Xarelto as well.  Patient states she has been able to walk since her mechanical fall, but still notes being fairly weak.  Patient states she has also had bright red blood from her rectum.  She first noticed this this afternoon in the toilet, and since arrival to the ED more has been present.  Patient states she is also had 2 episodes of yellow emesis today.  Patient has had temperatures " "around 100 degrees today as well.  She denies any dysuria, hematuria, or increased frequency.  Patient denies any recent travel, and states she did get her flu shot this year.  She denies any chest pain, or shortness of breath.  No myalgias, or arthralgias.\"    ED OBSERVATION COURSE: Betty Tee is a 79 year old female with a complicated past medical history including Sjogren's syndrome, osteoarthritis, chronic steroid use, ANGELICA, ILD, GERD, follicular bronchiolitis, diverticulosis, cardiomegaly, A. Fib (on xarelto), Hx of DVTs, HFpEF, stage III CKD, and type 2 diabetes mellitus who presented to the Emergency Department for evaluation of generalized weakness.      ##Generalized Weakness:  ##Nasuea/Vomiting:  ##Hematochezia:  She received successful L5-S1 injfection on 2/28.  Woke up feeling well the following day, Saturday (2/29). She was bending down to put clothing in her laundry shoot on Saturday when her right hip gave out and she fell. Notes weakness since this time. Starting 3/2 she developed hematochezia on paper but none in stool. Also vomited x 2 on the day of admission. Patient reports Tmax of 100 at home. Labs initially showed leukocytosis, improved this am. Hgb stable 13.8--> 12.6--> 13.2-->12.3 (Likely down some due to hydration with IVF). INR 1.36. Troponin negative x2. Type and screen completed. Pt declines rectal exam and guaiac testing. UA negative. UC pending. Stool occult + but unclear as to what this mean in the acute setting. Chest xray negative. Influenza negative. Denies abdominal pain. Denied diarrhea this am but with PT was incontinent of loose stool. PT evaluated her and recommended home with home care. This was arranged for her. Stool for culture and C. Diff were ordered but not collected as she did not have any further stools. Her symptoms have resolved and she is back to baseline. Tolerating regular diet. No further hematochezia. Per patient and friend, she is back to baseline. At " this point suspect viral gastroenteritis. However, C. Diff is also possible with antibiotics use. Have given her a cup and if she has further loose stools or further bleeding can bring sample to PCP. Recommend she discusses out-patient Colonoscopy with her primary care doctor. Have given her strict return instructions and should her bleeding recur she needs to return to the ED. The patient understand this. No hematemesis or coffee ground emesis. No melena. Patient back to baseline and feels ready to discharge to home.        ##Mechanica Fall: Was bending down to put clothing in her laundry shoot on 2/29 when her right hip gave out and she fell. Notes weakness since this time. She fell onto the hardwood floor. Was able to crawl to a chair to get up. Denies hitting head or other injury. Does have some ecchymosis to left hand. No preceding chest pain/pressure, nausea, palpitations. No LOC. No loss of bowel or bladder control. EKG without ischemic changes. Troponin negative x2. Suspect mechanical fall. Could also be vasovagal or orthostatic hypotension. Orthostatics + but now with GI illness as above. She was given 500 ml bolus today and symptoms resolved. X-ray pelvis and right hip complete and negative for fracture. This afternoon she is seen ambulating without difficulty. No pain with weight bearing. At this point MRI does not appear indicated. Recommend she continue to ambulate with walker. She does note acute on chronic hip pain with fall and will give her a short supply of Percocet which she has used in the past. Tolerated here without difficulty. Discussed need to be cautious with ambulation and try to avoid falls as this could be life threateningly or severally debilitating for her.        ##HFpEF:  Last echo 2/2019 shows EF 60-65%, trace mitral insufficiency, mild left atrial enlargement. Stable compared to previous.   -continue potassium, spironolactone, and torsemide.     Wt Readings from Last 10 Encounters:    03/04/20 90.4 kg (199 lb 3.2 oz)   02/28/20 90.7 kg (200 lb)   02/24/20 90.3 kg (199 lb)   02/15/20 90.3 kg (199 lb)   02/04/20 90.3 kg (199 lb)   01/15/20 94.8 kg (209 lb)   12/04/19 91.2 kg (201 lb)   12/03/19 91.2 kg (201 lb)   11/14/19 92.1 kg (203 lb)   10/22/19 92.1 kg (203 lb)           Chronic Conditions  ##Follicular bronchiolitis: continue PTA azithromycin. Pt feels her breathing is at baseline. Uses 4L oxygen with activity at baseline.  -Oxygen per nasal cannula as needed to keep SPO2>92%     ##Sjogrens: continue PTA cevimiline, plaquenil, singulair, prednisone.     ##Low back pain: Continue tizanidine and gabapentin       ##DM2: continue PTA lantus, pt reports 22 units every morning. Sliding scale Novolog ordered.     ##Afib: continue PTA toprol 62.5mg daily.Last echo 2/2019 shows EF 60-65%, trace mitral insufficiency, mild left atrial enlargement. Stable compared to previous. No further bleeding, ok to resume Xarelto     ##Insomnia: continue PTA trazadone     DISCHARGE DISPOSITION:   Discharged to home. The patient was discharged in a stable condition and agreed to discharge plan.    DISCHARGE INSTRUCTIONS AND FOLLOW-UP:  Discharge Procedure Orders   Home care nursing referral   Referral Priority: Routine Referral Type: Home Health Therapies & Aides   Number of Visits Requested: 1     Home Care PT Referral for Hospital Discharge   Referral Priority: Routine Referral Type: Home Health Therapies & Aides   Number of Visits Requested: 1     MD face to face encounter   Order Comments: Documentation of Face to Face and Certification for Home Health Services    I certify that patient: Betty Tee is under my care and that I, or a nurse practitioner or physician's assistant working with me, had a face-to-face encounter that meets the physician face-to-face encounter requirements with this patient on: March 4, 2020.    This encounter with the patient was in whole, or in part, for the following medical  condition, which is the primary reason for home health care: complicated past medical history including Sjogren's syndrome, osteoarthritis, chronic steroid use, ANGELICA, ILD, GERD, follicular bronchiolitis, diverticulosis, cardiomegaly, A. Fib (on xarelto), Hx of DVTs, HFpEF, stage III CKD, and type 2 diabetes mellitus who presented to the Emergency Department for evaluation of generalized weakness.      I certify that, based on my findings, the following services are medically necessary home health services: Nursing and Physical Therapy.    My clinical findings support the need for the above services because: Nurse is needed: To assess medication management, home safety after changes in medications or other medical regimen.. and Physical Therapy Services are needed to assess and treat the following functional impairments: mobility, endurance and strength.    Further, I certify that my clinical findings support that this patient is homebound (i.e. absences from home require considerable and taxing effort and are for medical reasons or [employer] services or infrequently or of short duration when for other reasons) because: Requires assistance of another person or specialized equipment to access medical services because patient: Requires supervision of another for safe transfer...    Based on the above findings. I certify that this patient is confined to the home and needs intermittent skilled nursing care, physical therapy and/or speech therapy.  The patient is under my care, and I have initiated the establishment of the plan of care.  This patient will be followed by a physician who will periodically review the plan of care.  Physician/Provider to provide follow up care: Ilene Tristan    Attending hospital physician (the Medicare certified [place] provider): Dr. Madrid  Physician Signature: See electronic signature associated with these discharge orders.  Date: 3/4/2020     Adult [facility] Follow-up and  recommended labs and tests   Order Comments: Follow up with primary care provider, Ilene Tristan, within 2 days for hospital follow- up.  The following labs/tests are recommended: INR, CBC.    If diarrhea returns, recommend bring a stool sample in for C. Diff and enteric panel       Appointments on [place] and/or [facility] (with [facility] or [facility] provider or service). Call [phone] if you haven't heard regarding these appointments within 7 days of discharge.     Activity   Order Comments: Your activity upon discharge: activity as tolerated and no driving for today, use walker at all times. Be cautious when using percocet. Go from sit to stand slowly. Sit down if you feel dizzy.     Order Specific Question Answer Comments   Is discharge order? Yes      When to contact your care team   Order Comments: Return to the ED with fever, recurrent bleeding, diarrhea, uncontrolled nausea, vomiting, unrelieved pain, bleeding not relieved with pressure, dizziness, chest pain, shortness of breath, loss of consciousness, and any new or concerning symptoms.     Reason for your hospital stay   Order Comments: You were admitted to observation with multiple symptoms: general weakness, fatigue, rectal bleeding. You had a fall on Saturday. Then started to feel unwell. On Monday you developed nausea, vomiting, and rectal bleeding. In observation you were treated with IV Fluid. You gradually improved and felt back to baseline.     Physical Therapy evaluated you and recommend discharge to home.     We did not find any signs of infection. Your bleeding stopped and you felt back to your baseline.     We are not sure what caused your symptoms, you might have a gastrointestinal virus. This could also be C. Diff given that you take antibiotics chronically. Your diarrhea has now resolved and you don't have abdominal pain, so it is safe to discharge you. However, if your diarrhea returns plaase bring a stool sample to your primary  care doctor. C. Diff can can be life threatening or debilitating if left untreated. If you have more bleeding you will need to return to the ED. At this time bleeding is stopped. However, with your xarelto you will need to be monitor closely as bleeding can be life threatening.     Your stool was + for occult blood which is difficult to interpret in the acute setting. Discuss indication for colonoscopy with your primary care doctor.    We will send you with a short supply of Percocet for you hip pain. Be cautious with this as it can make you sleepy and more prone to falls. We did an x-ray of your hip which didn't show fracture. However, if your pain worsens or you are unable to put weight on the hip, please return to the ED or follow-up with your primary as MRI might be indicated at this time. Currently your pain is better and you can put weight on your leg without issues, so MRI is not indicated at this time.     Oxygen Adult/Peds   Order Comments: Oxygen Documentation:   I certify that this patient, Betty Tee has been under my care and that I, or a nurse practitioner or physician's assistant working with me, had a face-to-face encounter that meets face-to-face encounter requirements with this patient on March 4, 2020.      Was the patient admitted for an acute respiratory illness? No    Betty Tee is now in a chronic stable state and continues to require supplemental oxygen due to continued oxygen desaturation.  This patient has been treated in part, or in whole for the following medical condition(s):  Use 4 L NC with exertion per prior to admission needs   Treatments tried and failed or ruled out to treat hypoxemia include Chronic need   If portability is ordered, is the patient mobile within the home? yes    **Patients who qualify for home O2 coverage under the CMS guidelines require ABG tests or O2 sat readings obtained closest to, but no earlier than 2 days prior to the discharge, as evidence of  the need for home oxygen therapy. Testing must be performed while patient is in the chronic stable state. See notes for O2 sats.**     Order Specific Question Answer Comments   DME Provider: [facility]    Oxygen Consult Reqt: Call [facility] before patient leaves at [phone] (to ensure SATS testing is completed).    Did the patient have SpO2 (sat) testing? No    Length of Need: Lifetime    Frequency of Use: With Activity    Mode of Delivery - With Activity Nasal Cannula    Liter Flow - With Activity 4    Need for Portability: Yes    Evaluate for Conserving Device: Yes    Maintain Sats >= 90%    The face to face evaluation was performed on: 3/4/2020      Diet   Order Comments: Follow this diet upon discharge: Orders Placed This Encounter      Combination Diet 2 gm NA Diet; No Caffeine Diet     Order Specific Question Answer Comments   Is discharge order? Yes       Attestation:   I have reviewed today's vital signs, notes, medications, labs and imaging.      Dominique Solorio APRN, CNP  Nurse Practitioner   Emergency Department Observation Unit

## 2020-03-04 NOTE — PROGRESS NOTES
[facility]   Met with patient and friend Nghia to discuss plans for HC.  Patient to be discharged home 3/4/20 (pending) and has agreed to have [facility] follow with services of RN, PT.  Patient care support center processing referral.  Patient verbalized understanding that initial visit is scheduled within 48 hours of discharge.  Patient has 24 hour phone number for [facility] for any questions or concerns.    Neva Stewart RN  [facility] Liaison   [phone]

## 2020-03-04 NOTE — PLAN OF CARE
"Observation Goals  -diagnostic tests and consults completed and resulted Not met  (pending PT/OT/SW)  -vital signs normal or at patient baseline Yes   -tolerating oral intake to maintain hydration Yes   -returns to baseline functional status Not met   -safe disposition plan has been identified Not met     BP (!) 140/73   Pulse 69   Temp 98.7  F (37.1  C) (Oral)   Resp 16   Ht 1.676 m (5' 6\")   Wt 90.4 kg (199 lb 3.2 oz)   SpO2 100%   BMI 32.15 kg/m     "

## 2020-03-04 NOTE — PLAN OF CARE
[facility]D PT  Discharge Planner PT   Patient plan for discharge: Home, open to home care services  Current status: PT evaluation complete. Pt demonstrates independence with bed mobility and transfers, ambulates household distances with SBA. Pt incontinent of stool during ambulation, per friend of pt this occurs at baseline. Pt independent with toilet transfer and pericares. Pt with new R LE muscle spasms since fall on Saturday, also reports having an injection in R hip on Friday.  Barriers to return to prior living situation: Medical status, weakness, decreased activity tolerance  Recommendations for discharge: Home with assist + HHPT/OT  Rationale for recommendations: Pt has good support of friends who are able to assist as needed. Pt would benefit from HHPT/OT services to progress strength, balance, and endurance safety and independence with functional mobility within the home environment.       Entered by: Amita Robertson 03/04/2020 9:15 AM

## 2020-03-04 NOTE — DISCHARGE SUMMARY
Discharge instructions reviewed, understood, and signed by patient. VSS, PIV removed, new medications reviewed and understood, patient has all belongings. Patient left unit via wheelchair with friend.

## 2020-03-04 NOTE — H&P
[facility], [place]    History and Physical - ED Observation       Date of Admission:  3/3/2020    Assessment & Plan   Betty Tee is a 79 year old female with a complicated past medical history including Sjogren's syndrome, osteoarthritis, chronic steroid use, ANGELICA, ILD, GERD, follicular bronchiolitis, diverticulosis, cardiomegaly, A. Fib (on xarelto), Hx of DVTs, HFpEF, stage III CKD, and type 2 diabetes mellitus who presents to the Emergency Department for evaluation of generalized weakness.     ##Weakness  ##Fever  ##Vomiting  ##BRBPR  Pt reports weakness since Saturday. She fell on Saturday, denies hitting head or other injury. She has been finding blood on the toilet paper when wiping for several days, increased volume today. She vomited twice this morning and continues to feel weak. Patient reports Tmax of 100 at home. In the ED, /69, HR 66, temp 99.3, RR 18, SPO2 96% on RA. Labs show normal electrolytes, Cr 0.94, BUN 13. WBC 13.5, Hgb 13.8, hematocrit 41.9. INR 1.36. Troponin negative. Type and screen completed. Pt declines rectal exam and guaiac testing. UA negative. UC pending. Chest xray negative. Influenza negative. No clear etiology for infection.   -[place] to ED Obs  -VS Q4hrs  -Telemetry  -EKG pending  -Hgb Q6hrs  -CBC and BMP in AM  -Fall precautions    Chronic Conditions  ##Follicular bronchiolitis: continue PTA azithromycin. Pt feels her breathing is at baseline. Uses 4L oxygen with activity at baseline.  -Oxygen per nasal cannula as needed to keep SPO2>92%    ##Sjogrens: continue PTA cevimiline, plaquenil, singulair, prednisone.     ##Low back pain: Hold PTA flexeril. Continue tizanidine bedtime dosing for now due to discomfort from ED stretcher. Pt reports stopping gabapentin completely since Saturday.   -Pharmacy med rec assistance for muscle relaxers and gabapentin.     ##DM2: continue PTA lantus, pt reports 22 units every morning. Sliding scale Novolog  "ordered.   -Glucose checks QID    ##Afib: continue PTA toprol 62.5mg daily. Continue Xarelto. Last echo 2/2019 shows EF 60-65%, trace mitral insufficiency, mild left atrial enlargement. Stable compared to previous.  -Telemetry ordered    ##HFpEF: continue potassium, spironolactone, and torsemide. Pt reports she did not take afternoon dose of torsemide yesterday afternoon due to coming to ED. Last echo 2/2019 shows EF 60-65%, trace mitral insufficiency, mild left atrial enlargement. Stable compared to previous. EKG stable.   -Telemetry  -Daily weights  -I/Os    ##Insomnia: continue PTA trazadone       Diet: Combination Diet 2 gm NA Diet; No Caffeine Diet    DVT Prophylaxis: Xarelto  Kilpatrick Catheter: not present  Code Status: full    Disposition Plan   Expected discharge: Tomorrow, recommended to prior living arrangement once hemoglobin stable and no further signs of infection noted.  Entered: Ghazal Abel CNP 03/04/2020, 2:13 AM       Ghazal Abel CNP  [facility], [place]  ED Observation, 6D  [id number]  ______________________________________________________________________    Chief Complaint   Weakness    History is obtained from the patient    History of Present Illness   Per ED,\"Betty Tee is a 79 year old female with a complicated past medical history including Sjogren's syndrome, osteoarthritis, ANGELICA, ILD, GERD, follicular bronchiolitis, diverticulosis, cardiomegaly, A. fib, DVTs, HFpEF, stage III CKD, and type 2 diabetes mellitus who presents to the Emergency Department for evaluation of generalized weakness.  Per chart review, the patient was recently seen at [facility] on 2/27/2020 and underwent a successful L5-S1 TFESI.  Patient was noted to have tolerated the procedure well with no immediate complications.     Patient presents to the Emergency Department today for evaluation of increased generalized weakness.  Patient states she was brought in by friends after " "weakness after a fall on Saturday (2/29/2020), and recent rectal bleeding.  Patient states she did not hit her head on this fall, and was ultimately able to get herself up.  Patient is currently on Xarelto as well.  Patient states she has been able to walk since her mechanical fall, but still notes being fairly weak.  Patient states she has also had bright red blood from her rectum.  She first noticed this this afternoon in the toilet, and since arrival to the ED more has been present.  Patient states she is also had 2 episodes of yellow emesis today.  Patient has had temperatures around 100 degrees today as well.  She denies any dysuria, hematuria, or increased frequency.  Patient denies any recent travel, and states she did get her flu shot this year.  She denies any chest pain, or shortness of breath.  No myalgias, or arthralgias.\"    Review of Systems    Constitutional: Negative for fever.   HENT: Negative for congestion.    Eyes: Negative for redness.   Respiratory: Negative for shortness of breath.    Cardiovascular: Negative for chest pain.   Gastrointestinal: Positive for anal bleeding, nausea and vomiting. Negative for abdominal pain and diarrhea.   Genitourinary: Negative for difficulty urinating, dysuria and hematuria.   Musculoskeletal: Negative for arthralgias, myalgias and neck stiffness.   Skin: Negative for color change.   Neurological: Positive for weakness. Negative for syncope and headaches.   Psychiatric/Behavioral: Negative for confusion.     Past Medical History    I have reviewed this patient's medical history and updated it with pertinent information if needed.   Past Medical History:   Diagnosis Date     Alcohol abuse, in remission      Allergic rhinitis, cause unspecified     allegra helps when she takes it     Antiplatelet or antithrombotic long-term use      Atrial fibrillation (H)     in hosp in 11/11 after surgery w/ fluid overload     Cardiomegaly     LVH on stress echo- cardiac w/u at " [facility]- neg CT scan for PE, neg stress echo in 8/06     Chest pain, unspecified      Disorder of bone and cartilage, unspecified     osteopenia (had been on prempro), improved on 6/06 dexa, stable dexa 11/10     Diverticulosis of colon (without mention of hemorrhage)     last episode yrs ago     Essential hypertension, benign      Follicular bronchiolitis (H)     associated with Sjogrens, dx by chest CT showing mosaic attenuation and air trapping     Gastro-oesophageal reflux disease      ILD (interstitial lung disease) (H)     associated with Sjogrens, also has mildly elevated IgG4, first noted on chest CT 2015 (mild changes) and also has small airways disease; ILD improved on follow up chest CT 2018.     Insomnia, unspecified     weaned off clonazepam     Irregular heart beat      Lumbago 7/09    MRI with [facility]AL, now seeing Dr. Cain for sciatic sx's     Major depressive disorder, recurrent episode, moderate (H)      Obstructive sleep apnea      Osteoarthrosis, unspecified whether generalized or localized, unspecified site      Sjogren's syndrome (H)     + RG and SSA and lip bx     Sleep apnea      Tobacco use disorder     chantix in 9/07, started again in 6/08, working       Past Surgical History   I have reviewed this patient's surgical history and updated it with pertinent information if needed.  Past Surgical History:   Procedure Laterality Date     BACK SURGERY  1962     BIOPSY BREAST  9/27/02    Biopsy Left Breast     C APPENDECTOMY  1970's?     C NONSPECIFIC PROCEDURE  11/05    exploratory abd lap, adhesions, resolved RLQ pain, diverticulitis episodes     CARDIAC SURGERY       CHOLECYSTECTOMY  1990's?     COLECTOMY LEFT  11/7/2011    Procedure:COLECTOMY LEFT; Laparoscopic mobilization of splenic flexture, sigmoid colectomy, coloprotoscopy, loop illeostomy; Surgeon:CK CASTANEDA; Location:[facility] OR     HYSTERECTOMY TOTAL ABDOMINAL, BILATERAL SALPINGO-OOPHORECTOMY, COMBINED  11/7/2011    Procedure:COMBINED  HYSTERECTOMY TOTAL ABDOMINAL, BILATERAL SALPINGO-OOPHORECTOMY; total abdominal hysterectomy, bilateral salpingo-oophorectomy; Surgeon:ALETA MANUEL; Location:[facility] OR     INSERT STENT URETER  2011    Procedure:INSERT STENT URETER; Placement of Bilateral Ureteral Stents ; Surgeon:PRANETEH BRYANT; Location:[facility] OR     SIGMOIDOSCOPY FLEXIBLE  11/3/2011    Procedure:SIGMOIDOSCOPY FLEXIBLE; Flexible Sigmoidoscopy; Surgeon:CK CASTANEDA; Location:[facility]     TAKEDOWN ILEOSTOMY  2012    Procedure:TAKEDOWN ILEOSTOMY; Takedown Loop Ileostomy ; Surgeon:CK CASTANEDA; Location:[facility] OR       Social History   I have reviewed this patient's social history and updated it with pertinent information if needed.  Social History     Tobacco Use     Smoking status: Former Smoker     Packs/day: 0.50     Years: 10.00     Pack years: 5.00     Types: Cigarettes     Last attempt to quit: 2011     Years since quittin.5     Smokeless tobacco: Never Used     Tobacco comment:  ppd   Substance Use Topics     Alcohol use: No     Alcohol/week: 0.0 standard drinks     Comment: In recovery beginning      Drug use: No       Family History   I have reviewed this patient's family history and updated it with pertinent information if needed.   Family History   Problem Relation Age of Onset     C.A.D. Mother 63        MI- first at age 63     Heart Disease Mother      Hypertension Mother      Cerebrovascular Disease Mother      Hyperlipidemia Mother      Alcohol/Drug Father      Alzheimer Disease Father      Dementia Father      Hypertension Father      Hyperlipidemia Father      Diabetes Sister      C.A.D. Sister 52        Minor MI- age 50's     Heart Disease Sister      Hypertension Sister      Hypertension Sister      Hypertension Brother      Cancer - colorectal Sister 48        Late 40's early 50's     Prostate Cancer Brother 74        Dx'd age 74     Gastrointestinal Disease Sister         Diverticulitis      Gastrointestinal Disease Brother         Diverticulitis     Lipids Sister      Lipids Sister      Parkinsonism Brother      Diabetes Sister      Heart Disease Sister         CHF     Cancer Sister         lung, smoker     Substance Abuse Sister      Substance Abuse Brother      Asthma Sister      Cancer Sister      Breast Cancer Daughter      Prostate Cancer Brother      Hyperlipidemia Brother      Diabetes Other      Hypertension Other        Prior to Admission Medications   Prior to Admission Medications   Prescriptions Last Dose Informant Patient Reported? Taking?   CEVIMELINE 30 MG PO capsule   No No   Sig: TAKE 1 CAPSULE (30 MG) BY MOUTH 3 TIMES DAILY   HYDROcodone-acetaminophen (NORCO) 5-325 MG tablet   No No   Sig: Take 1 tablet by mouth every 6 hours as needed for severe pain   NOVOLOG FLEXPEN 100 UNIT/ML soln   No No   Sig: INJECT 12 UNITS SUBCUTANEOUS 3 TIMES DAILY (WITH MEALS)   Semaglutide,0.25 or 0.5MG/DOS, (OZEMPIC, 0.25 OR 0.5 MG/DOSE,) 2 MG/1.5ML SOPN   No No   Sig: Inject 0.5 mg Subcutaneous once a week   acetaminophen (TYLENOL) 500 MG tablet   Yes No   Sig: Take 500 mg by mouth nightly as needed    acyclovir (ZOVIRAX) 400 MG tablet   No No   Sig: Take 1 tablet (400 mg) by mouth 3 times daily For a couple days   acyclovir (ZOVIRAX) 5 % external ointment   No No   Sig: Apply topically 6 times daily As needed for outbreaks   albuterol (PROAIR HFA, PROVENTIL HFA, VENTOLIN HFA) 108 (90 BASE) MCG/ACT inhaler   No No   Sig: Inhale 2 puffs into the lungs every 6 hours   atorvastatin (LIPITOR) 10 MG tablet   No No   Sig: Take 0.5 tablets (5 mg) by mouth daily   azithromycin (ZITHROMAX) 250 MG tablet   No No   Sig: Take 1 tablet (250 mg) by mouth daily   cyclobenzaprine 5 MG PO tablet   No No   Sig: Take 1 tablet (5 mg) by mouth 2 times daily as needed for muscle spasms   escitalopram (LEXAPRO) 20 MG tablet   No No   Sig: TAKE 1 TABLET BY MOUTH EVERY DAY   fluticasone (FLONASE) 50 MCG/ACT nasal spray   No No    Sig: Spray 1-2 sprays into both nostrils daily as needed for allergies   fluticasone-vilanterol (BREO ELLIPTA) 100-25 MCG/INH inhaler   No No   Sig: Inhale 1 puff into the lungs daily   gabapentin (NEURONTIN) 300 MG capsule   No No   Sig: Take 1 capsule (300 mg) by mouth 3 times daily   hydroxychloroquine (PLAQUENIL) 200 MG tablet   No No   Sig: Take 2 tablets (400 mg) by mouth daily Annual Plaquenil toxicity eye screening required.   insulin glargine (BASAGLAR KWIKPEN) 100 UNIT/ML pen   No No   Sig: INJECT 25 UNITS SUBCUTANEOUS DAILY (TO REPLACE LANTUS)   ketoconazole (NIZORAL) 2 % external cream   No No   Sig: Apply topically 2 times daily   lidocaine (LIDODERM) 5 % patch   No No   Sig: Apply patch to painful area for up to 12 h within a 24 h period.  Remove after 12 hours.   loratadine (CLARITIN) 10 MG tablet   No No   Sig: TAKE 1 TABLET BY MOUTH EVERY DAY   metoprolol succinate ER (TOPROL-XL) 25 MG 24 hr tablet   No No   Sig: Take 0.5 tablets (12.5 mg) by mouth 2 times daily Take 50 mg by mouth in combination with 12.5 for a total of 62.5 twice a day   metoprolol succinate ER (TOPROL-XL) 50 MG 24 hr tablet   No No   Sig: Take 50 mg by mouth in combination with 12.5 for a total of 62.5 twice a day   montelukast (SINGULAIR) 10 MG tablet   No No   Sig: TAKE 1 TABLET BY MOUTH EVERYDAY AT BEDTIME   order for DME   No No   Sig: Equipment being ordered: Oxygen  Pulsed oxygen portable tank  Rate: 4LNC  Diagnosis: ILD  Duration: Lifetime -99   potassium chloride ER (KLOR-CON) 20 MEQ CR tablet   No No   Sig: Take 2 tablets (40 mEq) by mouth every morning AND 1 tablet (20 mEq) every evening.   predniSONE (DELTASONE) 5 MG tablet   No No   Sig: Take 1 tablet (5 mg) by mouth daily   rivaroxaban ANTICOAGULANT (XARELTO ANTICOAGULANT) 20 MG PO TABS tablet   No No   Sig: Take 1 tablet (20 mg) by mouth daily (with dinner)   spironolactone (ALDACTONE) 25 MG tablet   No No   Sig: Take 2 tablets (50 mg) by mouth daily   tiZANidine  (ZANAFLEX) 2 MG tablet   No No   Sig: Take 1-2 tablets (2-4 mg) by mouth 3 times daily   tiZANidine (ZANAFLEX) 4 MG tablet   No No   Sig: Take 1 tablet (4 mg) by mouth nightly as needed for muscle spasms   torsemide (DEMADEX) 10 MG tablet   No No   Sig: Take one tab (10 mg) with one 20 mg tab to = 30 mg daily in afternoon.   torsemide (DEMADEX) 20 MG tablet   No No   Sig: Take 2 tabs (40 mg) in am daily   traZODone (DESYREL) 50 MG tablet   No No   Sig: TAKE 0.5-1.5 TABLETS (25-75 MG) BY MOUTH NIGHTLY AS NEEDED FOR SLEEP   vitamin D3 (CHOLECALCIFEROL) 2000 units (50 mcg) tablet   No No   Sig: Take 1 tablet (2,000 Units) by mouth daily      Facility-Administered Medications: None     Allergies   Allergies   Allergen Reactions     Amoxicillin-Pot Clavulanate      Augmentin Nausea and Vomiting     Codeine Nausea and Vomiting     Codeine      PN: LW Reaction: HIVES     Penicillins Nausea and Vomiting     PN: LW Reaction: GI Upset     Phenobarbital Itching     Phenobarbital      Seasonal Allergies        Physical Exam   Vital Signs: Temp: 99.3  F (37.4  C) Temp src: Oral BP: 107/59 Pulse: 66 Heart Rate: 66 Resp: 16 SpO2: 100 % O2 Device: None (Room air)    Weight: 200 lbs 0 oz    Constitutional: awake, alert, cooperative, no apparent distress, and appears stated age  Eyes: Lids and lashes normal, pupils equal, round and reactive to light, extra ocular muscles intact, sclera clear, conjunctiva normal  ENT: Normocephalic, atraumatic, external ears without lesions, oral pharynx with moist mucous membranes, gums normal and good dentition.  Respiratory: Clear to auscultation bilaterally, no crackles or wheezing  Cardiovascular: Regular rate and rhythm, normal S1 and S2, no S3 or S4, and no murmur noted  Abdomen: Normal bowel sounds, soft, non-distended, non-tender  Skin: Normal skin color, texture, turgor  Musculoskeletal: There is no redness, warmth, or swelling of the joints.  Full range of motion noted.  Motor strength is 5  out of 5 all extremities bilaterally.  Tone is normal.  Neurologic: Awake, alert, oriented to name, place and time.  Cranial nerves II-XII are grossly intact. Focal neuro exam intact.   Psychiatric: Behavior and thought content normal.    Data       Recent Labs   Lab 03/03/20  1940   WBC 13.5*   HGB 13.8   MCV 93      INR 1.36*      POTASSIUM 3.4   CHLORIDE 101   CO2 26   BUN 13   CR 0.94   ANIONGAP 7   CLAUDY 9.3   *   ALBUMIN 3.2*   PROTTOTAL 8.1   BILITOTAL 1.1   ALKPHOS 82   ALT 21   AST 13   TROPI <0.015     Recent Results (from the past 24 hour(s))   Chest XR,  PA & LAT    Narrative    EXAM: XR CHEST 2 VW  LOCATION: [facility]  DATE/TIME: 3/3/2020 10:55 PM    INDICATION: Cough. Weakness.  COMPARISON: None.      Impression    IMPRESSION: Heart is not enlarged. Aorta is atherosclerotic. Small amount of biapical pleural thickening. No pneumothorax. Minimal amount of bilateral lower lung atelectasis. Upper lungs clear. Surgical clips right upper quadrant of abdomen.

## 2020-03-04 NOTE — ED TRIAGE NOTES
Pt arrives to triage with reports of weakness since Saturday.  Pt fell on Saturday and that was when the weakness started.  Pt was recently diagnosed with a pinched nerve and received an injection in hip on Friday.

## 2020-03-04 NOTE — PLAN OF CARE
"Observation Goals  -diagnostic tests and consults completed and resulted No (pending PT/OT/SW)  -vital signs normal or at patient baseline Yes BP (!) 153/65 (BP Location: Right arm)   Pulse 66   Temp 98.4  F (36.9  C) (Oral)   Resp 16   Ht 1.676 m (5' 6\")   Wt 90.4 kg (199 lb 3.2 oz)   SpO2 95%   BMI 32.15 kg/m    -tolerating oral intake to maintain hydration Yes   -returns to baseline functional status No  -safe disposition plan has been identified No  "

## 2020-03-04 NOTE — PROGRESS NOTES
[facility]  Daily Progress Note          Assessment & Plan:   Betty Tee is a 79 year old female with a complicated past medical history including Sjogren's syndrome, osteoarthritis, chronic steroid use, ANGELICA, ILD, GERD, follicular bronchiolitis, diverticulosis, cardiomegaly, A. Fib (on xarelto), Hx of DVTs, HFpEF, stage III CKD, and type 2 diabetes mellitus who presented to the Emergency Department for evaluation of generalized weakness.      ##Generalized Weakness:  ##Nasuea/Vomiting:  ##Hematochezia:  She received successful L5-S1 injfection on 2/28.  Woke up feeling well the following day, Saturday (2/29). Was bending down to put clothing in her laundry shoot on Saturday when her right hip gave out and she fell. Notes weakness since this time. Starting 3/2 she has noted hematochezia on paper but none in stool. Also vomited x 2 on the day of admission. Patient reports Tmax of 100 at home. Labs initially showed leukocytosis, improved this am. Hgb stable 13.8--> 12.6--> 13.2. INR 1.36. Troponin negative. Type and screen completed. Pt declines rectal exam and guaiac testing. UA negative. UC pending. Chest xray negative. Influenza negative. Denies abdominal pain. Denied diarrhea this am but with PT was incontinent of loose stool. PT evaluated her and recommended home with home care. Differential diagnosis: viral gastroenteric, colitis, viral URI, diverticular bleed (unlikely), versus other. Complicated given immunosuppressed state.   -VS Q4hrs  -Hgb Q6hrs  -Send stool for culture   -Enteric and contact isolation   -Fall precautions    ##Fall: Was bending down to put clothing in her laundry shoot on 2/29 when her right hip gave out and she fell. Notes weakness since this time. She fell onto the hardwood floor. Was able to crawl to a chair to get up. Denies hitting head or other injury. Does have some ecchymosis to left hand. No preceding chest pain/pressure, nausea,  palpitations. No LOC. No loss of bowel or bladder control. EKG without ischemic changes. Troponin negative. Suspect mechanical fall. Could also be vasovagal or orthostatic hypotension. Orthostatics + but now with GI illness as above.    -Orthostatic vital signs +, will give 500 ml bolus, need to be cautious in the setting of CHF   -TELE     ##HFpEF:  Last echo 2/2019 shows EF 60-65%, trace mitral insufficiency, mild left atrial enlargement. Stable compared to previous. EKG stable.   -Telemetry  -Daily weights  -I/Os  -continue potassium, spironolactone, and torsemide. Consider holding afternoon torsemide if remains orthostatic     Wt Readings from Last 10 Encounters:   03/04/20 90.4 kg (199 lb 3.2 oz)   02/28/20 90.7 kg (200 lb)   02/24/20 90.3 kg (199 lb)   02/15/20 90.3 kg (199 lb)   02/04/20 90.3 kg (199 lb)   01/15/20 94.8 kg (209 lb)   12/04/19 91.2 kg (201 lb)   12/03/19 91.2 kg (201 lb)   11/14/19 92.1 kg (203 lb)   10/22/19 92.1 kg (203 lb)         Chronic Conditions  ##Follicular bronchiolitis: continue PTA azithromycin. Pt feels her breathing is at baseline. Uses 4L oxygen with activity at baseline.  -Oxygen per nasal cannula as needed to keep SPO2>92%     ##Sjogrens: continue PTA cevimiline, plaquenil, singulair, prednisone.     ##Low back pain: Hold PTA flexeril. Continue tizanidine bedtime dosing for now due to discomfort from ED stretcher. Pt reports stopping gabapentin completely since Saturday.   -Pharmacy med rec assistance for muscle relaxers and gabapentin.      ##DM2: continue PTA lantus, pt reports 22 units every morning. Sliding scale Novolog ordered.   -Glucose checks AC and HS   -sliding scale insulin    ##Afib: continue PTA toprol 62.5mg daily.Last echo 2/2019 shows EF 60-65%, trace mitral insufficiency, mild left atrial enlargement. Stable compared to previous.  -Telemetry ordered  - Resume Xarelto, hold if significant bleeding      ##Insomnia: continue PTA trazadone       FEN: Moderate CHO  "Diet  Lines: PIV  Prophylaxis: Early ambulation and home Xarelto           Consults:   PT  CC         Discharge Planning:   Pending improved symptoms        Interval History:   Sitting on the edge of bed. Denies abdominal pain, nausea. Tolerating regular diet. Feels generally weak. Denies cough, or SOB.     ROS:   Constitutional:  Tolerating diet.   Cardiovascular: No chest pain or palpitations.   GI: No abdominal pain. No N/V.      : No urinary complaints.   Musculoskeletal: Some chronic right hip pain, denied acute changes or acute pain.           Physical Exam:   BP (!) 140/73   Pulse 69   Temp 98.7  F (37.1  C) (Oral)   Resp 16   Ht 1.676 m (5' 6\")   Wt 90.4 kg (199 lb 3.2 oz)   SpO2 100%   BMI 32.15 kg/m       GENERAL: Alert and oriented x 3. NAD.   HEENT: Anicteric sclera. Mucous membranes moist.   CV: Irregular. No murmurs appreciated.   RESPIRATORY: Effort normal. Lungs CTAB with no wheezing, rales, rhonchi.   GI: Abdomen soft and non distended with normoactive bowel sounds present in all quadrants. No tenderness, rebound, guarding.   NEUROLOGICAL: No focal deficits. Moves all extremities.    EXTREMITIES: No peripheral edema. Intact bilateral pedal pulses.   SKIN: No jaundice. No rashes.     Medication list reviewed.   Today's labs and imaging were reviewed.     LIZY Arreguin, CNP  Nurse Practitioner  Emergency Department Observation Unit      "

## 2020-03-04 NOTE — PROGRESS NOTES
Care Coordinator - Discharge Planning    Admission Date/Time:  3/3/2020  Attending MD:  No att. providers found     Data  Chart reviewed, discussed with interdisciplinary team.   Patient was admitted for: No diagnosis found.     Assessment   Concerns with insurance coverage for discharge needs: None.  Current Living Situation: Patient roommate.  Support System: Supportive and Involved  Services Involved: None  Transportation at Discharge: Car and Family or friend will provide  Transportation to Medical Appointments:    - Name of caregiver: Self  Barriers to Discharge: Medical stability     Pt status reviewed/discussed during care team rounds.   Anticipate pt will discharge to home.  PT recommending home with home care.  Provider requesting home RN f/u as well.    Met with pt. Introduced RNCC role.  Reviewed anticipated plan for discharge.    Reviewed/discussed recommendation for home care.  Per discussion with pt she has had [facility] in the past.   Pt would prefer to have [facility].  No other concerns or needs noted.  BARTH form reviewed/signed/copy given    Discharge orders updated. Email referral made to [facility].       Coordination of Care and Referrals: Provided patient/family with options for Home Care.      Plan  Anticipated Discharge Date:  3/4/2020  Anticipated Discharge Plan:  Home with home health    Ashlyn Crenshaw RN BSN, PHN RN Care Coordinator  Internal Medicine   [phone]  Pager: [phone]  Weekend RN Care Coordinator job code * * * [phone]  [facility] [employer]  3/4/2020 11:30 AM

## 2020-03-04 NOTE — ED NOTES
[facility], [place]   ED Nurse to Floor Handoff     Betty Tee is a 79 year old female who speaks English and lives with others,  in a home  They arrived in the ED by car from emergency room    ED Chief Complaint: Generalized Weakness    ED Dx;   Final diagnoses:   None         Needed?: No    Allergies:   Allergies   Allergen Reactions     Amoxicillin-Pot Clavulanate      Augmentin Nausea and Vomiting     Codeine Nausea and Vomiting     Codeine      PN: LW Reaction: HIVES     Penicillins Nausea and Vomiting     PN: LW Reaction: GI Upset     Phenobarbital Itching     Phenobarbital      Seasonal Allergies    .  Past Medical Hx:   Past Medical History:   Diagnosis Date     Alcohol abuse, in remission      Allergic rhinitis, cause unspecified     allegra helps when she takes it     Antiplatelet or antithrombotic long-term use      Atrial fibrillation (H)     in hosp in 11/11 after surgery w/ fluid overload     Cardiomegaly     LVH on stress echo- cardiac w/u at [facility] ER- neg CT scan for PE, neg stress echo in 8/06     Chest pain, unspecified      Disorder of bone and cartilage, unspecified     osteopenia (had been on prempro), improved on 6/06 dexa, stable dexa 11/10     Diverticulosis of colon (without mention of hemorrhage)     last episode yrs ago     Essential hypertension, benign      Follicular bronchiolitis (H)     associated with Sjogrens, dx by chest CT showing mosaic attenuation and air trapping     Gastro-oesophageal reflux disease      ILD (interstitial lung disease) (H)     associated with Sjogrens, also has mildly elevated IgG4, first noted on chest CT 2015 (mild changes) and also has small airways disease; ILD improved on follow up chest CT 2018.     Insomnia, unspecified     weaned off clonazepam     Irregular heart beat      Lumbago 7/09    MRI with NEAL, now seeing Dr. Cain for sciatic sx's     Major depressive disorder, recurrent episode, moderate (H)       Obstructive sleep apnea      Osteoarthrosis, unspecified whether generalized or localized, unspecified site      Sjogren's syndrome (H)     + RG and SSA and lip bx     Sleep apnea      Tobacco use disorder     chantix in 9/07, started again in 6/08, working      Baseline Mental status: WDL  Current Mental Status changes: at basesline    Infection present or suspected this encounter: cultures pending  Sepsis suspected: No  Isolation type: No active isolations     Activity level - Baseline/Home:  Stand with Assist  Activity Level - Current:   Stand with Assist    Bariatric equipment needed?: No    In the ED these meds were given:   Medications   acetaminophen (TYLENOL) tablet 650 mg (650 mg Oral Given 3/3/20 2149)   acetaminophen (TYLENOL) tablet 650 mg (has no administration in time range)   acetaminophen (TYLENOL) Suppository 650 mg (has no administration in time range)   naloxone (NARCAN) injection 0.1-0.4 mg (has no administration in time range)   melatonin tablet 1 mg (has no administration in time range)   ondansetron (ZOFRAN-ODT) ODT tab 4 mg (has no administration in time range)     Or   ondansetron (ZOFRAN) injection 4 mg (has no administration in time range)   lidocaine 1 % 0.1-1 mL (has no administration in time range)   lidocaine (LMX4) cream (has no administration in time range)   sodium chloride (PF) 0.9% PF flush 3 mL (has no administration in time range)   sodium chloride (PF) 0.9% PF flush 3 mL (has no administration in time range)   glucose gel 15-30 g (has no administration in time range)     Or   dextrose 50 % injection 25-50 mL (has no administration in time range)     Or   glucagon injection 1 mg (has no administration in time range)   insulin glargine (LANTUS PEN) injection 22 Units (has no administration in time range)   insulin aspart (NovoLOG) injection (RAPID ACTING) (has no administration in time range)   insulin aspart (NovoLOG) injection (RAPID ACTING) (has no administration in time  range)   0.9% sodium chloride BOLUS (0 mLs Intravenous Stopped 3/3/20 2295)       Drips running?  No    Home pump  No    Current LDAs  Peripheral IV 03/03/20 Left (Active)   Site Assessment WDL 3/3/2020  7:41 PM   Number of days: 1       Labs results:   Labs Ordered and Resulted from Time of ED Arrival Up to the Time of Departure from the ED   CBC WITH PLATELETS DIFFERENTIAL - Abnormal; Notable for the following components:       Result Value    WBC 13.5 (*)     Absolute Neutrophil 10.5 (*)     Absolute Monocytes 1.4 (*)     All other components within normal limits   INR - Abnormal; Notable for the following components:    INR 1.36 (*)     All other components within normal limits   COMPREHENSIVE METABOLIC PANEL - Abnormal; Notable for the following components:    Glucose 157 (*)     GFR Estimate 57 (*)     Albumin 3.2 (*)     All other components within normal limits   ROUTINE UA WITH MICROSCOPIC REFLEX TO CULTURE - Abnormal; Notable for the following components:    Blood Urine Trace (*)     Protein Albumin Urine 10 (*)     Leukocyte Esterase Urine Moderate (*)     WBC Urine 6 (*)     RBC Urine 4 (*)     Mucous Urine Present (*)     All other components within normal limits   GLUCOSE BY METER - Abnormal; Notable for the following components:    Glucose 113 (*)     All other components within normal limits   TROPONIN I   GLUCOSE MONITOR NURSING POCT   GLUCOSE MONITOR NURSING POCT   GLUCOSE MONITOR NURSING POCT   GLUCOSE MONITOR NURSING POCT   GLUCOSE MONITOR NURSING POCT   HEMOGLOBIN   PERIPHERAL IV CATHETER   CARDIAC CONTINUOUS MONITORING   IP ASSIGN PROVIDER TEAM TO TREATMENT TEAM   OBSERVATION GOALS   ASSESS   NOTIFY PHYSICIAN   MEASURE HEIGHT AND WEIGHT   VITAL SIGNS   NOTIFY   NOTIFY   DAILY WEIGHTS   INTAKE AND OUTPUT   PERIPHERAL IV CATHETER   ACTIVITY   TELEMETRY MONITORING CARDIOLOGY ADULT   IP CARBOHYDRATE COUNTING BY NURSING   PATIENT EDUCATION   ASSESS FOR HYPOGLYCEMIA SYMPTOMS   NOTIFY PHYSICIAN  "  ABO/RH TYPE AND SCREEN   INFLUENZA A AND B AND RSV PCR       Imaging Studies:   Recent Results (from the past 24 hour(s))   Chest XR,  PA & LAT    Narrative    EXAM: XR CHEST 2 VW  LOCATION: [facility]  DATE/TIME: 3/3/2020 10:55 PM    INDICATION: Cough. Weakness.  COMPARISON: None.      Impression    IMPRESSION: Heart is not enlarged. Aorta is atherosclerotic. Small amount of biapical pleural thickening. No pneumothorax. Minimal amount of bilateral lower lung atelectasis. Upper lungs clear. Surgical clips right upper quadrant of abdomen.       Recent vital signs:   /59   Pulse 66   Temp 99.3  F (37.4  C) (Oral)   Resp 16   Ht 1.676 m (5' 6\")   Wt 90.7 kg (200 lb)   SpO2 100%   BMI 32.28 kg/m      Mao Coma Scale Score: 15 (03/04/20 0005)       Cardiac Rhythm: Normal Sinus  Pt needs tele? Yes  Skin/wound Issues: None    Code Status: Full Code    Pain control: good    Nausea control: good    Abnormal labs/tests/findings requiring intervention:     Family present during ED course? No   Family Comments/Social Situation comments:     Tasks needing completion: Hemoglobin collect    Deandre Askew, RN  asc --   [phone]  [phone] ED      "

## 2020-03-04 NOTE — PROGRESS NOTES
03/04/20 0918   Quick Adds   Type of Visit Initial PT Evaluation       Present no   Language English   Living Environment   Lives With friend(s)   Living Arrangements house   Home Accessibility stairs to enter home   Number of Stairs, Main Entrance 3   Stair Railings, Main Entrance railings on both sides of stairs   Transportation Anticipated family or friend will provide   Living Environment Comment Pt lives in a house with another sister. A third sister is pt's healthcare agent and checks in on pt and roommate daily and is able to assist as needed.   Self-Care   Usual Activity Tolerance good   Current Activity Tolerance moderate   Regular Exercise No   Equipment Currently Used at Home walker, rolling;shower chair   Activity/Exercise/Self-Care Comment Pt has been using FWW for all mobility lately, has tub shower with shower chair.   Functional Level Prior   Ambulation 1-->assistive equipment   Transferring 1-->assistive equipment   Toileting 0-->independent   Bathing 1-->assistive equipment   Communication 0-->understands/communicates without difficulty   Swallowing 0-->swallows foods/liquids without difficulty   Cognition 0 - no cognition issues reported   Fall history within last six months yes   Number of times patient has fallen within last six months 1   Which of the above functional risks had a recent onset or change? fall history   Prior Functional Level Comment Saurabh with functional mobility and ADLs at baseline   General Information   Onset of Illness/Injury or Date of Surgery - Date 03/03/20   Referring Physician Ghazal Abel CNP   Patient/Family Goals Statement to return home   Pertinent History of Current Problem (include personal factors and/or comorbidities that impact the POC) Per chart, Betty Tee is a 79 year old female with a complicated past medical history including Sjogren's syndrome, osteoarthritis, chronic steroid use, ANGELICA, ILD, GERD, follicular  bronchiolitis, diverticulosis, cardiomegaly, A. Fib (on xarelto), Hx of DVTs, HFpEF, stage III CKD, and type 2 diabetes mellitus who presents to the Emergency Department for evaluation of generalized weakness.    Precautions/Limitations fall precautions   General Info Comments Activity orders: ambulate with assist   Cognitive Status Examination   Orientation orientation to person, place and time   Level of Consciousness alert   Follows Commands and Answers Questions 100% of the time   Personal Safety and Judgment intact   Memory intact   Cognitive Comment Pt's friend and healthcare agent present during PT evaluation reports pt has had intermittent confusion lately, does not specify time/duration.   Pain Assessment   Patient Currently in Pain Yes, see Vital Sign flowsheet  (R LE, muscle spasms present)   Integumentary/Edema   Integumentary/Edema no deficits were identifed   Posture    Posture Forward head position;Protracted shoulders   Range of Motion (ROM)   ROM Comment B LE ROM WFL   Strength   Strength Comments B LE strength not formally assessed though demonstrates >3/5 per functional status   Bed Mobility   Bed Mobility Comments Independent   Transfer Skills   Transfer Comments Independent   Gait   Gait Comments SBA for gait to/from bathroom; cues to slow down though pt urgently ambulating to bathroom 2/2 stool incontinence. Educated pt on fall risk with rushing when ambulating and encouraged to take time despite circumstance.   Balance   Balance Comments Good seated balance, SBA for standing balance   Sensory Examination   Sensory Perception no deficits were identified   General Therapy Interventions   Planned Therapy Interventions balance training;gait training;neuromuscular re-education;strengthening;home program guidelines;progressive activity/exercise;risk factor education   Clinical Impression   Criteria for Skilled Therapeutic Intervention yes, treatment indicated   PT Diagnosis Impaired functional  "mobility   Influenced by the following impairments Decreased strength and endurance, impaired balance   Functional limitations due to impairments Pt requires assist for safe functional mobility   Clinical Presentation Stable/Uncomplicated   Clinical Presentation Rationale PMH and clinical judgment   Clinical Decision Making (Complexity) Low complexity   Therapy Frequency Other (see comments)  (1x/wk if OBS, 4x/wk if inpt)   Predicted Duration of Therapy Intervention (days/wks) 3 days   Anticipated Equipment Needs at Discharge   (None)   Anticipated Discharge Disposition Home with Assist;Home with Home Therapy   Risk & Benefits of therapy have been explained Yes   Patient, Family & other staff in agreement with plan of care Yes   Clinical Impression Comments PT evaluation complete. Pt overall safe with functional mobility, slightly impulsive 2/2 urgent bathroom needs with stool incontinence though no overt balance deficits noted. Pt safe to return home with assist and home PT/OT at this time. Will hold futher IP PT services at this time unless pt transitions to inpatient status as pt currently observation status.   [facility] [employer]-PAC TM \"6 Clicks\"   2016, Trustees of [facility], under license to [employer].  All rights reserved.   6 Clicks Short Forms Basic Mobility Inpatient Short Form   [facility] AM-PAC  \"6 Clicks\" V.2 Basic Mobility Inpatient Short Form   1. Turning from your back to your side while in a flat bed without using bedrails? 4 - None   2. Moving from lying on your back to sitting on the side of a flat bed without using bedrails? 4 - None   3. Moving to and from a bed to a chair (including a wheelchair)? 4 - None   4. Standing up from a chair using your arms (e.g., wheelchair, or bedside chair)? 4 - None   5. To walk in hospital room? 3 - A Little   6. Climbing 3-5 steps with a railing? 3 - A Little   Basic Mobility Raw Score (Score out of 24.Lower scores equate to lower levels of " function) 22   Total Evaluation Time   Total Evaluation Time (Minutes) 16

## 2020-03-05 ENCOUNTER — PATIENT OUTREACH (OUTPATIENT)
Dept: CARE COORDINATION | Facility: CLINIC | Age: 80
End: 2020-03-05

## 2020-03-05 ENCOUNTER — TELEPHONE (OUTPATIENT)
Dept: FAMILY MEDICINE | Facility: CLINIC | Age: 80
End: 2020-03-05

## 2020-03-05 DIAGNOSIS — Z71.89 OTHER SPECIFIED COUNSELING: ICD-10-CM

## 2020-03-05 LAB
BACTERIA SPEC CULT: NORMAL
Lab: NORMAL
SPECIMEN SOURCE: NORMAL

## 2020-03-05 NOTE — PLAN OF CARE
Physical Therapy Discharge Summary    Reason for therapy discharge:    Discharged to home with home therapy.    Progress towards therapy goal(s). See goals on Care Plan in [facility] electronic health record for goal details.  Goals met    Therapy recommendation(s):    Continued therapy is recommended.  Rationale/Recommendations:  Home with assist + HHPT/OT.

## 2020-03-05 NOTE — PROGRESS NOTES
Clinic Care Coordination Contact  [facility]/Voicemail       Clinical Data: Care Coordinator Outreach  Outreach attempted x 1.  Left message on patient's voicemail with call back information and requested return call.  Plan:. Care Coordinator will try to reach patient again in 1-2 business days.

## 2020-03-05 NOTE — TELEPHONE ENCOUNTER
"Hospital/TCU/ED for chronic condition Discharge Protocol    \"Hi, my name is Kerline Leo RN, a registered nurse, and I am calling from [facility].  I am calling to follow up and see how things are going for you after your recent emergency visit/hospital/TCU stay.\"    Tell me how you are doing now that you are home?\" Not so well, pt sounds frustrated with the progress of her hip and having a fall        Discharge Instructions    \"Let's review your discharge instructions.  What is/are the follow-up recommendations? Follow up with ortho.    Pt. Response:   \"Has an appointment with your primary care provider been scheduled?\"   No (schedule appointment) patient declined    \"When you see the provider, I would recommend that you bring your medications with you.\"    Medications    \"Tell me what changed about your medicines when you discharged?\"    Changes to chronic meds?    0-1    \"What questions do you have about your medications?\"    None     New diagnoses of heart failure, COPD, diabetes, or MI?    No              Post Discharge Medication Reconciliation Status: discharge medications reconciled, continue medications without change.    Was MTM referral placed (*Make sure to put transitions as reason for referral)?   No    Call Summary    \"What questions or concerns do you have about your recent visit and your follow-up care?\"     none    \"If you have questions or things don't continue to improve, we encourage you contact us through the main clinic number (give number).  Even if the clinic is not open, triage nurses are available 24/7 to help you.     We would like you to know that our clinic has extended hours (provide information).  We also have urgent care (provide details on closest location and hours/contact info)\"      \"Thank you for your time and take care!\"             "

## 2020-03-06 ENCOUNTER — DOCUMENTATION ONLY (OUTPATIENT)
Dept: CARE COORDINATION | Facility: CLINIC | Age: 80
End: 2020-03-06
Payer: COMMERCIAL

## 2020-03-06 NOTE — PROGRESS NOTES
The clinic Community Health Worker spoke with the patient today due to ED/Hospital Discharge to discuss possible Clinic Care Coordination enrollment.  The service was described to the patient and immediate needs were discussed.  The patient declined enrollment at this time.  The PCP is encouraged to refer  in the future if the patient's needs change.

## 2020-03-06 NOTE — PROGRESS NOTES
[facility] now requests orders and shares plan of care/discharge summaries for some patients through [employer].  Please REPLY TO THIS MESSAGE OR ROUTE BACK TO THE AUTHOR in order to give authorization for orders when needed.  This is considered a verbal order, you will still receive a faxed copy of orders for signature.  Thank you for your assistance in improving collaboration for our patients.    Hi Dr. Tristan,     Patient has been seen today for homecare admission. Can we get orders.   I need SN 2 week 1, 1 week 2, 2 PRN for assess medication and teach, diet, vital signs and weight, respiratory and cardiac status, pain management, and activity tolerance, hydration, nutrition and bowel status and home safety.    PT 1 day 10 for eval and treat for right hip and leg pain, strengthening, home exercise, fall risk assess.     Thank you for your time.

## 2020-03-09 ENCOUNTER — DOCUMENTATION ONLY (OUTPATIENT)
Dept: CARE COORDINATION | Facility: CLINIC | Age: 80
End: 2020-03-09
Payer: COMMERCIAL

## 2020-03-09 DIAGNOSIS — I10 ESSENTIAL HYPERTENSION WITH GOAL BLOOD PRESSURE LESS THAN 140/90: ICD-10-CM

## 2020-03-10 NOTE — PROGRESS NOTES
She has been on both medications since 2017.  The main concern with the combination is elevated potassium, and potassium has been in the low to normal level since she has been on the combination.   She should continue on both unless her potassium increases.  Thanks,  CW

## 2020-03-10 NOTE — PROGRESS NOTES
[facility] now requests orders and shares plan of care/discharge summaries for some patients through [employer].  Please REPLY TO THIS MESSAGE OR ROUTE BACK TO THE AUTHOR in order to give authorization for orders when needed.  This is considered a verbal order, you will still receive a faxed copy of orders for signature.  Thank you for your assistance in improving collaboration for our patients.    Hi Dr. Tristan,     I'm doing a medication review. I understand that patient has been on Potassium and Spironolactone which can increase risk of severe adverse interaction. I just need a confirmation that patient is ok to continue taking both these medication together.     Thank you for your time.

## 2020-03-13 ENCOUNTER — TELEPHONE (OUTPATIENT)
Dept: ORTHOPEDICS | Facility: CLINIC | Age: 80
End: 2020-03-13

## 2020-03-14 DIAGNOSIS — E11.9 TYPE 2 DIABETES MELLITUS WITHOUT COMPLICATION, WITHOUT LONG-TERM CURRENT USE OF INSULIN (H): ICD-10-CM

## 2020-03-14 DIAGNOSIS — Z79.4 TYPE 2 DIABETES MELLITUS WITH HYPERGLYCEMIA, WITH LONG-TERM CURRENT USE OF INSULIN (H): Primary | ICD-10-CM

## 2020-03-14 DIAGNOSIS — E11.65 TYPE 2 DIABETES MELLITUS WITH HYPERGLYCEMIA, WITH LONG-TERM CURRENT USE OF INSULIN (H): Primary | ICD-10-CM

## 2020-03-14 NOTE — TELEPHONE ENCOUNTER
Called patient about next appointment on 3/20. Patient is feeling much better after ANDRAE. No tingling down legs anymore. Patient did have a fall last week and was admitted to the hospital and could have a possible hairline fx. Patient does not want to come in at this time.

## 2020-03-15 ENCOUNTER — HEALTH MAINTENANCE LETTER (OUTPATIENT)
Age: 80
End: 2020-03-15

## 2020-03-16 NOTE — TELEPHONE ENCOUNTER
CW,   Routing refill request to provider for review/approval because:  Drug not on the [facility] refill protocol   Thanks,  Sophia Hemphill RN

## 2020-03-16 NOTE — TELEPHONE ENCOUNTER
Last Written Prescription Date:  10/8/2019  Last Fill Quantity: 4.5ml,  # refills: 1   Last office visit: 1/21/2020 with prescribing provider:  Dr. Tristan  Future Office Visit:   Next 5 appointments (look out 90 days)    Apr 15, 2020 11:00 AM CDT  Office Visit with Ilene Tristan MD  [facility] ([facility]) [address] [address] [phone]         Requested Prescriptions   Pending Prescriptions Disp Refills     OZEMPIC, 0.25 OR 0.5 MG/DOSE, 2 MG/1.5ML SOPN [Pharmacy Med Name: OZEMPIC 0.25-0.5 MG DOSE PEN]  1     Sig: INJECT 0.5 MG SUBCUTANEOUS ONCE A WEEK       There is no refill protocol information for this order        Jacki Bai MA  Medical Assistant  [facility]

## 2020-03-17 ENCOUNTER — ALLIED HEALTH/NURSE VISIT (OUTPATIENT)
Dept: PHARMACY | Facility: CLINIC | Age: 80
End: 2020-03-17
Payer: COMMERCIAL

## 2020-03-17 DIAGNOSIS — I50.32 CHRONIC HEART FAILURE WITH PRESERVED EJECTION FRACTION (H): ICD-10-CM

## 2020-03-17 DIAGNOSIS — F33.1 MAJOR DEPRESSIVE DISORDER, RECURRENT EPISODE, MODERATE (H): ICD-10-CM

## 2020-03-17 DIAGNOSIS — G47.01 INSOMNIA DUE TO MEDICAL CONDITION: ICD-10-CM

## 2020-03-17 DIAGNOSIS — I48.21 PERMANENT ATRIAL FIBRILLATION (H): ICD-10-CM

## 2020-03-17 DIAGNOSIS — R21 RASH: ICD-10-CM

## 2020-03-17 DIAGNOSIS — E78.2 MIXED HYPERLIPIDEMIA: ICD-10-CM

## 2020-03-17 DIAGNOSIS — J44.89 FOLLICULAR BRONCHIOLITIS (H): ICD-10-CM

## 2020-03-17 DIAGNOSIS — M85.852 OSTEOPENIA OF NECK OF LEFT FEMUR: ICD-10-CM

## 2020-03-17 DIAGNOSIS — M35.02 SJOGREN'S SYNDROME WITH LUNG INVOLVEMENT (H): ICD-10-CM

## 2020-03-17 DIAGNOSIS — M19.90 INFLAMMATORY ARTHRITIS: ICD-10-CM

## 2020-03-17 DIAGNOSIS — E11.65 TYPE 2 DIABETES MELLITUS WITH HYPERGLYCEMIA, WITH LONG-TERM CURRENT USE OF INSULIN (H): Primary | ICD-10-CM

## 2020-03-17 DIAGNOSIS — J30.1 NON-SEASONAL ALLERGIC RHINITIS DUE TO POLLEN: ICD-10-CM

## 2020-03-17 DIAGNOSIS — Z79.4 TYPE 2 DIABETES MELLITUS WITH HYPERGLYCEMIA, WITH LONG-TERM CURRENT USE OF INSULIN (H): Primary | ICD-10-CM

## 2020-03-17 PROCEDURE — 99605 MTMS BY PHARM NP 15 MIN: CPT | Performed by: PHARMACIST

## 2020-03-17 PROCEDURE — 99607 MTMS BY PHARM ADDL 15 MIN: CPT | Performed by: PHARMACIST

## 2020-03-17 RX ORDER — SEMAGLUTIDE 1.34 MG/ML
INJECTION, SOLUTION SUBCUTANEOUS
Qty: 4.5 ML | Refills: 0 | Status: SHIPPED | OUTPATIENT
Start: 2020-03-17 | End: 2020-06-10

## 2020-03-17 NOTE — PROGRESS NOTES
MTM ENCOUNTER  SUBJECTIVE/OBJECTIVE:                           Betty Tee is a 79 year old female called for a transitions of care visit. She was discharged from [facility] ED/observation on 3/4 for generalized weakness, N/V, hematochezia, acute on chronic LBP and right hip pain, HFpEF, follicular bronchiolitis, sjogrens, DM2, A.Fib and insomnia.       Chief Complaint: as above.     Allergies/ADRs: Reviewed in Epic  Tobacco:  reports that she quit smoking about 8 years ago. Her smoking use included cigarettes. She has a 5.00 pack-year smoking history. She has never used smokeless tobacco.  Alcohol: not currently using    Medication Adherence/Access: no issues reported    Hip Pain: Today pain is a 4-5 out of 10. She has used up the 12 tablets of hydrocodone/APAP that she had - was taking both tylenol and this (over 1000mg/dose). She found the Norco very helpful. She is currently taking only APAP 1000mg a few times a day. Reports pain is more of an ache at this point - the injection she got is helping with the pain/burning that went down her leg.  She is getting home PT to help with pain as well. Plans on getting her hip looked at after the pandemic has passed. PT recommended sleeping with a laundry basket under her leg, which let her sleep for 2 hours (has been having a hard time sleeping due to her pain). Additional meds for pain management include: Gabapentin 300mg at bedtime (rx'd as 300mg TID, doesn't feel that she needs this much. Really feels like the shot she got prevously helped with the burning she was having), Lidoderm patches - forgot she had these and really hasn't been using them (was using them a lot prior to injection).    Diabetes:  Pt currently taking Ozempic 0.5mg weekly, Novolog 6 units three times a day with meals, Basaglar 22 units daily. Pt is not experiencing side effects. Still having some constipation but this is manageable.   SMBG: three times daily.   Ranges (patient reported): 120-130 on  "average - today was 161 in the morning and 149 at lunch time. Yesterday morning 110.   Patient is not experiencing hypoglycemia  Recent symptoms of high blood sugar? none  Eye exam: up to date  Foot exam: up to date  ACEi/ARB: No. Urine albumin <30mg/g Cr and BP runs low with meds needed to manage rate (A.Fib).   Aspirin: Not taking due to Xarelto use  Diet/Exercise: Exercise limited to walking and PT due to pain, but still trying to get some activity (went on walk to [employer] recently). Diet wise she's not a \"big eater\" and has small portions.      A.fib: Currently anticoagulated with Xarelto 20mg daily. During recent ER visit had bleeding from her rectum, but this has since resolved and no other s/sx of bleeding.     HFpEF: Current medications include metoprolol succinate 62.5mg twice daily, torsemide 30mg in the morning and 40mg at noon, spironolactone 50mg daily (2-25mg tablets) and potassium 20mEq - 2 tablets in the AM and 1 tablet in the PM .  Patient reports no current medication side effects.     Pt is not complaining of sx of HF.    Pt is measuring daily weights and reports that it is stable - 198 pounds on her scale today.      Bronchiolitis: Currently taking  PT was at the house yesterday and checked her O2 sats and it was at 96%, after walking about 8 feet this dropped to 90%. She reports she can walk (and has to walk) but gets very winded. She has to choose between using her walker or oxygen, and she chooses to use the walker.   Currently taking azithromycin as an anti-imflammatory, Breo 100/25mg once daily and albuterol as needed (although she hasn't used much, if any, of this).      Sjogren's: Taking hydroxychloriquine 200mg - 2 tablets at bedtime, Prednisone 5mg daily (has been slowly tapering from higher doses) and Cevimeline 30mg daily (rx'd as TID, but states she doesn't need that much, would be drooling all the time).     Osteopenia: Currently taking vitamin D 2,000 international unit(s) " and believes she gets 3 servings of calcium in her daily diet. She has had a Dexa in Aug 2019, with lowest T-Score of -2.1 at left femoral neck. Per note from Dr. Lopez (Rheumatology), Betty was not interested in taking a bisphosphonate (has a history of taking alendronate in the past) and Dr. Lopez recommended that she see endocrinology, which was scheduled, and subsequently canceled on 3/20/20. Betty will reschedule in a few months.     Insomnia: Taking 50mg each night, prior to her hip issue she was taking as needed, but now feels that she needs it nightly. It's somewhat helpful without significant morning hangover.     Allergies: Currently using Flonase 2 sprays in each nostril daily and Singulair (also helping with lung function). Denies SE with these meds.     Hyperlipidemia: Current therapy includes atorvastatin 5mg once daily (1/2 10mg tablet).  Pt reports no significant myalgias or other side effects.     Depression:  Current medications include: Escitalopram 20mg once daily. Pt reports that depression symptoms are well controlled. .  PHQ-9 SCORE 10/2/2018 4/16/2019 10/22/2019   PHQ-9 Total Score - - -   PHQ-9 Total Score MyChart - - -   PHQ-9 Total Score 4 5 4     Skin: using Ketoconazole daily after bathing and acyclovir only as needed for outbreaks both are effective without SE.     CrCl based on adjusted body weight is 56ml/min, based on actual body weight is 70mL/min.     ASSESSMENT:                            Medication Adherence: no issues identified    Hip Pain: Discussed APAP daily and dosage limits. She understands.     Diabetes: Per pt report, BG at goal. Her last A1c was also at goal.      A.fib: Stable - CrCl currently >50mg/mL, Xarelto dose is appropriate.     HFpEF: Stable    Bronchiolitis: Will connect with community health worker to see if there is a way we can get her a more portable oxygen unit so she can take this with her while she is out and about.      Sjogren's:  Stable    Osteopenia: Plan in place to f/u with endo once possible.     Insomnia: Stable    Allergies: Stable    Hyperlipidemia: Stable     Depression: Stable    Skin: Stable    PLAN:                          Post Discharge Medication Reconciliation Status: discharge medications reconciled, continue medications without change.    1. Pt instructed to avoid more tylenol than 1000mg/dose and 4000mg in a day (actually encouraged to keep under 3000mg/day).     2. Will engage community health worker about more portable option for oxygen.     I spent 30 minutes with this patient today. All changes were made via collaborative practice agreement with Dr. Tristan. A copy of the visit note was provided to the patient's primary care provider.    Will follow up in 1 month by phone, sooner if needed.    The patient was sent via [employer] a summary of these recommendations.     Sabrina Meek, MarcosD, JAMES, BCACP  MTM Pharmacist, [facility]

## 2020-03-17 NOTE — TELEPHONE ENCOUNTER
Rx sent, but discussed with Sabrina Stewart (MT), who has reached out to pt.  Strongly recommend she make a phone visit with her.  I should see her in the office when COVID issues are better.  Thanks- CW

## 2020-03-17 NOTE — PATIENT INSTRUCTIONS
Recommendations from today's MTM visit:                                                      1. Do not take more than 1,000mg of Tylenol (acetaminophen) at once, and no more than 3,000mg in 24 hours.     It was great to speak with you today.  I value your experience and would be very thankful for your time with providing feedback on our clinic survey. You may receive a survey via email or text message in the next few days.     Next MTM visit: Sabrina will call you in a month.     To schedule another MTM appointment, please call the clinic directly or you may call the MTM scheduling line at [phone] or toll-free at [phone].     My Clinical Pharmacist's contact information:                                                      It was a pleasure talking with you today!  Please feel free to contact me with any questions or concerns you have.      Sabrina Meek, Pharm.D., M.B.A., [facility]  MTM Pharmacist, [facility]  Pager: [phone]  Email: [email or web address]

## 2020-03-20 ENCOUNTER — PRE VISIT (OUTPATIENT)
Dept: ENDOCRINOLOGY | Facility: CLINIC | Age: 80
End: 2020-03-20

## 2020-03-23 ENCOUNTER — TELEPHONE (OUTPATIENT)
Dept: ORTHOPEDICS | Facility: CLINIC | Age: 80
End: 2020-03-23

## 2020-03-23 ENCOUNTER — VIRTUAL VISIT (OUTPATIENT)
Dept: ORTHOPEDICS | Facility: CLINIC | Age: 80
End: 2020-03-23
Payer: MEDICARE

## 2020-03-23 DIAGNOSIS — I50.43 ACUTE ON CHRONIC COMBINED SYSTOLIC AND DIASTOLIC HEART FAILURE (H): ICD-10-CM

## 2020-03-23 DIAGNOSIS — M54.16 LUMBAR BACK PAIN WITH RADICULOPATHY AFFECTING RIGHT LOWER EXTREMITY: ICD-10-CM

## 2020-03-23 DIAGNOSIS — M35.02 SJOGREN'S SYNDROME WITH LUNG INVOLVEMENT (H): ICD-10-CM

## 2020-03-23 DIAGNOSIS — M25.551 ACUTE PAIN OF RIGHT HIP: Primary | ICD-10-CM

## 2020-03-23 RX ORDER — HYDROXYCHLOROQUINE SULFATE 200 MG/1
400 TABLET, FILM COATED ORAL DAILY
Qty: 180 TABLET | Refills: 1 | Status: SHIPPED | OUTPATIENT
Start: 2020-03-23 | End: 2020-10-08

## 2020-03-23 RX ORDER — HYDROCODONE BITARTRATE AND ACETAMINOPHEN 5; 325 MG/1; MG/1
1 TABLET ORAL EVERY 6 HOURS PRN
Qty: 15 TABLET | Refills: 0 | Status: SHIPPED | OUTPATIENT
Start: 2020-03-23 | End: 2020-06-23

## 2020-03-23 NOTE — TELEPHONE ENCOUNTER
I called Sister Sita regarding her leg pain that has gotten worse in the last 4-5 days. Upon talking with her Dr. El felt that it was best to have a phone visit with her to answer all of her questions. The patient agreed to have a phone visit.     VARUN Olivares

## 2020-03-23 NOTE — PROGRESS NOTES
"Betty Tee is a 79 year old female who is being evaluated via a billable telephone visit.      The patient has been notified of following:     \"This telephone visit will be conducted via a call between you and your physician/provider. We have found that certain health care needs can be provided without the need for a physical exam.  This service lets us provide the care you need with a short phone conversation.  If a prescription is necessary we can send it directly to your pharmacy.  If lab work is needed we can place an order for that and you can then stop by our lab to have the test done at a later time.    If during the course of the call the physician/provider feels a telephone visit is not appropriate, you will not be charged for this service.\"     Betty Tee complains of ongoing recurrent right lower extremity burning pain.  She states that she had very good relief after her epidural steroid injection on 2/27/2020.  Unfortunately on 2/28 she suffered a fall.  She was admitted from the ED on 3/3/2020.  At that time her pelvis and hip were evaluated with radiographs and unremarkable aside from osteoarthritis of hip.  Work-up continued for hematochezia but no clear cause, possibly overdiuresis versus dehydration.    Patient states that over the past couple of weeks she continues to have pain in the region of the groin.  Her worst pain at this time is however a recurrent pain that she had noted with her radicular symptoms.  Pain is burning in nature.  It is at the posterior hip and lateral thigh.  It does not extend down the leg at this time.  She has been using her walker.  She had used Percocet in a limited fashion which she is no longer using.  She is also continuing to take gabapentin.      Chief Complaint   Patient presents with     Right leg pain     I have reviewed and updated the patient's Past Medical History, Social History, Family History and Medication List.    ALLERGIES  Amoxicillin-pot " clavulanate; Augmentin; Codeine; Codeine; Penicillins; Phenobarbital; Phenobarbital; and Seasonal allergies    Assessment/Plan:  Acute lumbar radiculopathy affecting right lower extremity  Pain of right groin.    We do long discussion regarding further diagnostic measures as well as treatment options.  I did recommend that she pursue advanced imaging including CT/MRI of pelvis and hips.  She is very uneasy about exposing herself to the clinics and surgery center for advanced imaging.  She would prefer to wait at this time.  She does ambulate with walker and does not have immense groin pain.  I advised her that I would place CT order and that she may pursue this as quarantine rules soften.  In the meantime she will continue use the walker,  I will refill her prescription for pain medication to be used sparingly, as well as gabapentin.  She also continue to follow a symptom diary.      Phone call duration: 20 minutes    William El DO CAQSM  Primary Care Sports Medicine  [facility]

## 2020-03-23 NOTE — TELEPHONE ENCOUNTER
[facility] Call Center    Phone Message    May a detailed message be left on voicemail: yes     Reason for Call: Symptoms or Concerns     If patient has red-flag symptoms, warm transfer to triage line    Current symptom or concern: R leg pain    Symptoms have been present for:  4 day(s)    Has patient previously been seen for this? Yes    By Dr. El: Ortho Walk in Clinic    Date: 2/24 in clinic w/ Dr. El, had injection on 2/28    Are there any new or worsening symptoms? Yes: pain has been intense since Friday 3/20, can't get comfortable and cannot sleep. Tylenol does not help      Action Taken: Message routed to:  [facility] ([facility]    Travel Screening: Not Applicable

## 2020-03-24 RX ORDER — TORSEMIDE 10 MG/1
TABLET ORAL
Qty: 90 TABLET | Refills: 3 | Status: SHIPPED | OUTPATIENT
Start: 2020-03-24 | End: 2021-03-26

## 2020-03-24 NOTE — TELEPHONE ENCOUNTER
TORSEMIDE 10 MG TABLET   Last Written Prescription Date:  3/25/2019  Last Fill Quantity: 90,   # refills: 3  Last Office Visit : 8/8/2019  Future Office visit:  8/27/2020  90 Tabs, 3 Refills sent to pharm 3/24/2020    Mere Weinberg RN  Central Triage Red Flags/Med Refills

## 2020-04-01 NOTE — PROGRESS NOTES
DISCHARGE SUMMARY    Betty Tee was seen 3 times for evaluation and treatment.  Patient did not return for further treatment and current status is unknown.  Due to short treatment duration, no objective or functional changes were made.  Please see goal flow sheet from episode noted date below and initial evaluation for further information.  Patient is discharged from therapy and therapy episode is resolved as of 04/01/20.      No linked episodes

## 2020-04-02 ENCOUNTER — TELEPHONE (OUTPATIENT)
Dept: FAMILY MEDICINE | Facility: CLINIC | Age: 80
End: 2020-04-02
Payer: COMMERCIAL

## 2020-04-02 NOTE — TELEPHONE ENCOUNTER
Received form(s) from [facility] and plan of care for medication orders.  Placed form(s) in/on CW's box.  Forms need to be signed and faxed to number listed on form..    Call pt to verify form was sent: No  Copy needs to be sent for scanning after completion: Yes

## 2020-04-03 DIAGNOSIS — Z53.9 DIAGNOSIS NOT YET DEFINED: Primary | ICD-10-CM

## 2020-04-03 PROCEDURE — G0180 MD CERTIFICATION HHA PATIENT: HCPCS | Performed by: FAMILY MEDICINE

## 2020-04-06 NOTE — RESULT ENCOUNTER NOTE
There is a possibility of very small monoclonal protein which could bee seen at older age and could be simply monitored/repeated every year. However I could set up a hematology virtual visit to review and monitor this for you. Please let me know if you agree with this plan.

## 2020-04-12 DIAGNOSIS — I50.30 (HFPEF) HEART FAILURE WITH PRESERVED EJECTION FRACTION (H): ICD-10-CM

## 2020-04-15 NOTE — TELEPHONE ENCOUNTER
TORSEMIDE 20 MG TABLET     Last Written Prescription Date:  3/24/2020  Last Fill Quantity: 90,   # refills: 3  Last Office Visit : 8/8/2019  Future Office visit:  8/27/2020    Routing refill request to provider for review/approval because:  Pharmacy asking for increase of tablets and refills for Pt to cover whole year.  Refer to clinic for review and new orders for requested refills.    Mere Weinberg RN  Central Triage Red Flags/Med Refills

## 2020-04-16 RX ORDER — TORSEMIDE 20 MG/1
TABLET ORAL
Qty: 270 TABLET | Refills: 4 | Status: SHIPPED | OUTPATIENT
Start: 2020-04-16 | End: 2021-07-09

## 2020-04-24 ENCOUNTER — TELEPHONE (OUTPATIENT)
Dept: ORTHOPEDICS | Facility: CLINIC | Age: 80
End: 2020-04-24

## 2020-04-24 NOTE — TELEPHONE ENCOUNTER
The patient stated that she is still having a lot of pain. Dr. El's previous note stated that he would like her to get a CT which she is still hesitant to do given the current COVID situation. His note also told her to try taking gabapentin which she hasn't done but stating that she would start taking it again. She was requesting pain medications that she was previously prescribed. I told her that unfortunately Dr. El isn't in the office until Sunday and the other doctors typically won't refill pain meds when they haven't seen the patient. The patient stated she understood and we scheduled her a telephone visit with Dr. El for Sunday. She stated that she understood and had no further questions.     VARUN Olivares

## 2020-04-24 NOTE — TELEPHONE ENCOUNTER
I left a message for the patient regarding her previous call about her pain and options. A call back number was given.     VARUN Olivares

## 2020-04-24 NOTE — TELEPHONE ENCOUNTER
[facility] Call Center    Phone Message    May a detailed message be left on voicemail: yes     Reason for Call: Other: Pt would like a call back to discuss pain as she is in alot of pain and would like to discuss her options     Action Taken: Message routed to:  [facility] ([facility]    Travel Screening: Not Applicable

## 2020-04-26 ENCOUNTER — VIRTUAL VISIT (OUTPATIENT)
Dept: ORTHOPEDICS | Facility: CLINIC | Age: 80
End: 2020-04-26
Payer: MEDICARE

## 2020-04-26 DIAGNOSIS — M54.16 LUMBAR BACK PAIN WITH RADICULOPATHY AFFECTING RIGHT LOWER EXTREMITY: Primary | ICD-10-CM

## 2020-04-26 DIAGNOSIS — M35.02 SJOGREN'S SYNDROME WITH LUNG INVOLVEMENT (H): ICD-10-CM

## 2020-04-26 DIAGNOSIS — J84.9 ILD (INTERSTITIAL LUNG DISEASE) (H): ICD-10-CM

## 2020-04-26 RX ORDER — HYDROCODONE BITARTRATE AND ACETAMINOPHEN 5; 325 MG/1; MG/1
TABLET ORAL
Qty: 15 TABLET | Refills: 0 | Status: SHIPPED | OUTPATIENT
Start: 2020-04-26 | End: 2020-06-11

## 2020-04-26 NOTE — PROGRESS NOTES
"Betty Tee is a 79 year old female who is being evaluated via a billable telephone visit.      The patient has been notified of following:     \"This telephone visit will be conducted via a call between you and your physician/provider. We have found that certain health care needs can be provided without the need for a physical exam.  This service lets us provide the care you need with a short phone conversation.  If a prescription is necessary we can send it directly to your pharmacy.  If lab work is needed we can place an order for that and you can then stop by our lab to have the test done at a later time.    Telephone visits are billed at different rates depending on your insurance coverage. During this emergency period, for some insurers they may be billed the same as an in-person visit.  Please reach out to your insurance provider with any questions.    If during the course of the call the physician/provider feels a telephone visit is not appropriate, you will not be charged for this service.\"    Patient has given verbal consent for Telephone visit?  Yes    How would you like to obtain your AVS? E-Mail (inform patient AVS not encrypted)    Sister Sita is a 79-year-old woman presenting to discuss ongoing right lumbar, hip pain and lower leg pain.  Continued severe pain traveling from her right hip laterallly down leg into foot.  Last epidural injection on 2/26 did not provide substantial relief.  Has been using tylenol, sparing 1/2 tab norco PRN.   At last telephone visit 4 weeks ago discussed consideration for hip imaging but due to restrictions we decided to hold off.      At this time, her pain is most severe in radicular pattern and suspect lumbar radiculopathy.  I would like her to see one of our spine surgical colleagues given ongoing pain despite PT, epidural injection, and pain management.  Continue use of walker.  Refill small script for Norco to use sparingly.      Phone call duration: 20 " minutes    William El DO CAM  Primary Care Sports Medicine  [facility]

## 2020-04-27 DIAGNOSIS — B37.2 CUTANEOUS CANDIDIASIS: ICD-10-CM

## 2020-04-27 RX ORDER — MONTELUKAST SODIUM 10 MG/1
TABLET ORAL
Qty: 30 TABLET | Refills: 0 | Status: SHIPPED | OUTPATIENT
Start: 2020-04-27 | End: 2020-05-19

## 2020-04-27 NOTE — TELEPHONE ENCOUNTER
Reason for Call:  Medication or medication refill:    Do you use a [facility]?  Name of the pharmacy and phone number for the current request: [facility] #[id number] - [place], [place] - [address]      Name of the medication requested: ketoconazole (NIZORAL) 2 %    Other request: none    Can we leave a detailed message on this number? YES    Phone number patient can be reached at: Cell number on file:    Telephone Information:   Mobile [phone]       Best Time: any    Call taken on 4/27/2020 at 2:32 PM by Tanya Corona

## 2020-04-27 NOTE — TELEPHONE ENCOUNTER
"MONTELUKAST SOD 10 MG TABLET   Last Written Prescription Date:  02/03/2020  Last Fill Quantity:90,  # refills: 0   Last office visit: 1/21/2020 with prescribing provider:  JANA   Future Office Visit:  Nothing at this time scheduled.    Requested Prescriptions   Pending Prescriptions Disp Refills     montelukast (SINGULAIR) 10 MG tablet [Pharmacy Med Name: MONTELUKAST SOD 10 MG TABLET] 90 tablet 0     Sig: TAKE 1 TABLET BY MOUTH EVERYDAY AT BEDTIME       Leukotriene Inhibitors Protocol Passed - 4/26/2020  1:03 PM        Passed - Patient is age 12 or older     If patient is under 16, ok to refill using age based dosing.           Passed - Recent (12 mo) or future (30 days) visit within the authorizing provider's specialty     Patient has had an office visit with the authorizing provider or a provider within the authorizing providers department within the previous 12 mos or has a future within next 30 days. See \"Patient Info\" tab in inbasket, or \"Choose Columns\" in Meds & Orders section of the refill encounter.              Passed - Medication is active on med list           "

## 2020-04-27 NOTE — TELEPHONE ENCOUNTER
Medication is being filled for 1 time refill only due to:  Patient needs to be seen because due for phone visit this month.   Lydia HALEY RN

## 2020-04-28 ENCOUNTER — TELEPHONE (OUTPATIENT)
Dept: ORTHOPEDICS | Facility: CLINIC | Age: 80
End: 2020-04-28

## 2020-04-28 NOTE — TELEPHONE ENCOUNTER
Called to schedule and left VM    ----- Message from Afia Bagley ATC sent at 4/26/2020 11:42 AM CDT -----  Regarding: Schedule with spine  Can someone reach out to this patient and assist her getting set up with a spine surgeon? Referral is in the chart.    ThanksPorter

## 2020-04-28 NOTE — TELEPHONE ENCOUNTER
CW,    Routing refill request to provider for review/approval because:  Last Rx 11/28/18 for 60 g with 1 refill.  Last OV 1/21/2020.    Thanks,  Sophia Hemphill RN

## 2020-04-29 RX ORDER — KETOCONAZOLE 20 MG/G
CREAM TOPICAL 2 TIMES DAILY PRN
Qty: 60 G | Refills: 1 | Status: SHIPPED | OUTPATIENT
Start: 2020-04-29 | End: 2020-07-29

## 2020-05-08 ENCOUNTER — TELEPHONE (OUTPATIENT)
Dept: RHEUMATOLOGY | Facility: CLINIC | Age: 80
End: 2020-05-08

## 2020-05-08 NOTE — TELEPHONE ENCOUNTER
----- Message from Zulma Lopez MD sent at 4/6/2020 12:55 AM CDT -----  There is a possibility of very small monoclonal protein which could bee seen at older age and could be simply monitored/repeated every year. However I could set up a hematology virtual visit to review and monitor this for you. Please let me know if you agree with this plan.

## 2020-05-08 NOTE — TELEPHONE ENCOUNTER
Called and spoke with pt regarding lab results.  She is fine with Dr. Lopez continuing to monitor. She does not feel the need to see a hematologist unless Dr. Lopez feels it is necessary.    Kamille Ruffin RN  Rheumatology Clinic

## 2020-05-13 DIAGNOSIS — E78.5 HYPERLIPIDEMIA LDL GOAL <100: ICD-10-CM

## 2020-05-13 RX ORDER — ATORVASTATIN CALCIUM 10 MG/1
TABLET, FILM COATED ORAL
Qty: 45 TABLET | Refills: 0 | Status: SHIPPED | OUTPATIENT
Start: 2020-05-13 | End: 2020-08-17

## 2020-05-15 ENCOUNTER — TELEPHONE (OUTPATIENT)
Dept: FAMILY MEDICINE | Facility: CLINIC | Age: 80
End: 2020-05-15

## 2020-05-15 ENCOUNTER — VIRTUAL VISIT (OUTPATIENT)
Dept: FAMILY MEDICINE | Facility: CLINIC | Age: 80
End: 2020-05-15
Payer: MEDICARE

## 2020-05-15 DIAGNOSIS — E78.5 HYPERLIPIDEMIA LDL GOAL <100: ICD-10-CM

## 2020-05-15 DIAGNOSIS — Z79.4 TYPE 2 DIABETES MELLITUS WITH HYPERGLYCEMIA, WITH LONG-TERM CURRENT USE OF INSULIN (H): Primary | ICD-10-CM

## 2020-05-15 DIAGNOSIS — M54.16 LUMBAR BACK PAIN WITH RADICULOPATHY AFFECTING RIGHT LOWER EXTREMITY: ICD-10-CM

## 2020-05-15 DIAGNOSIS — M19.90 INFLAMMATORY ARTHRITIS: ICD-10-CM

## 2020-05-15 DIAGNOSIS — J84.9 ILD (INTERSTITIAL LUNG DISEASE) (H): ICD-10-CM

## 2020-05-15 DIAGNOSIS — E11.65 TYPE 2 DIABETES MELLITUS WITH HYPERGLYCEMIA, WITH LONG-TERM CURRENT USE OF INSULIN (H): Primary | ICD-10-CM

## 2020-05-15 DIAGNOSIS — N18.30 CKD (CHRONIC KIDNEY DISEASE) STAGE 3, GFR 30-59 ML/MIN (H): ICD-10-CM

## 2020-05-15 DIAGNOSIS — I10 ESSENTIAL HYPERTENSION WITH GOAL BLOOD PRESSURE LESS THAN 140/90: ICD-10-CM

## 2020-05-15 PROCEDURE — 99443 ZZC PHYSICIAN TELEPHONE EVALUATION 21-30 MIN: CPT | Performed by: FAMILY MEDICINE

## 2020-05-15 ASSESSMENT — ANXIETY QUESTIONNAIRES
2. NOT BEING ABLE TO STOP OR CONTROL WORRYING: NOT AT ALL
IF YOU CHECKED OFF ANY PROBLEMS ON THIS QUESTIONNAIRE, HOW DIFFICULT HAVE THESE PROBLEMS MADE IT FOR YOU TO DO YOUR WORK, TAKE CARE OF THINGS AT HOME, OR GET ALONG WITH OTHER PEOPLE: SOMEWHAT DIFFICULT
5. BEING SO RESTLESS THAT IT IS HARD TO SIT STILL: SEVERAL DAYS
6. BECOMING EASILY ANNOYED OR IRRITABLE: SEVERAL DAYS
3. WORRYING TOO MUCH ABOUT DIFFERENT THINGS: NOT AT ALL
GAD7 TOTAL SCORE: 3
1. FEELING NERVOUS, ANXIOUS, OR ON EDGE: NOT AT ALL
7. FEELING AFRAID AS IF SOMETHING AWFUL MIGHT HAPPEN: NOT AT ALL

## 2020-05-15 ASSESSMENT — PATIENT HEALTH QUESTIONNAIRE - PHQ9
5. POOR APPETITE OR OVEREATING: SEVERAL DAYS
SUM OF ALL RESPONSES TO PHQ QUESTIONS 1-9: 4

## 2020-05-15 NOTE — TELEPHONE ENCOUNTER
Called clinic and surgery center  Scheduled pt for 2pm lab only appt  Same address:   [address] [place] [address] [address]st Floor though   Ortho appt on 4th Floor  Patient informed  Lydia HALEY RN

## 2020-05-15 NOTE — NURSING NOTE
"Chief Complaint   Patient presents with     Diabetes     initial There were no vitals taken for this visit. Estimated body mass index is 32.15 kg/m  as calculated from the following:    Height as of 3/4/20: 1.676 m (5' 6\").    Weight as of 3/4/20: 90.4 kg (199 lb 3.2 oz).  BP completed using cuff size: .  L  R arm      Health Maintenance that is potentially due pending provider review:      Tcao Parra ma  "

## 2020-05-15 NOTE — PROGRESS NOTES
"Betty Tee is a 80 year old female who is being evaluated via a billable telephone visit.      The patient has been notified of following:     \"This telephone visit will be conducted via a call between you and your physician/provider. We have found that certain health care needs can be provided without the need for a physical exam.  This service lets us provide the care you need with a short phone conversation.  If a prescription is necessary we can send it directly to your pharmacy.  If lab work is needed we can place an order for that and you can then stop by our lab to have the test done at a later time.    Telephone visits are billed at different rates depending on your insurance coverage. During this emergency period, for some insurers they may be billed the same as an in-person visit.  Please reach out to your insurance provider with any questions.    If during the course of the call the physician/provider feels a telephone visit is not appropriate, you will not be charged for this service.\"    Patient has given verbal consent for Telephone visit?  Yes    What phone number would you like to be contacted at? [phone]    How would you like to obtain your AVS? Mail a copy      Subjective   Betty Tee is a 80 year old female who presents to clinic today for the following health issues:    HPI  Diabetes Follow-up  How often are you checking your blood sugar? Three times daily  Blood sugar testing frequency justification:    What time of day are you checking your blood sugars (select all that apply)?  Before and after meals  Have you had any blood sugars above 200?  No  Have you had any blood sugars below 70?  No    What symptoms do you notice when your blood sugar is low?  None    What concerns do you have today about your diabetes? None and Other: high readings     Do you have any of these symptoms? (Select all that apply)  No numbness or tingling in feet.  No redness, sores or blisters on feet.  " No complaints of excessive thirst.  No reports of blurry vision.  No significant changes to weight.    Pain issues-   R leg pain improved for a few wks after injection with Dr. El in 2/20, but then it came back.    This week has been really bad- T-Thursday am, 10/10 pain during that time.  Her back and R leg pain hurt like crazy.  Now the left leg is bad as well.  Right side is the sciatic sx's.  Wondering if some of it could be her inflammatory issues.  Couldn't make it to the bathroom in time.  ~3am, she took   Today and yesterday, it's better, and she 'comes out of it'.  When she's in it, it feels like it's going to be forever.  Now at 0/10 other than 'soreness' from the pain.  Doesn't count that as pain.    Inflammatory - thought of that when the left leg started, more the joints.    Sports medicine doctor is sending her to a spine orthopedist. Appt in early June.    ~4 wks ago, she had an incident when her R knee swelled up, excruciating pain and very hot.  Roommate wanted her to go to the ER, but she wanted to avoid it due to COVID.  Lasted all evening until ~3am.    Rec f/u with rheumatology.    Prednisone 5mg/d through rheumatology currently.    Concerns-  Glucose levels have seemed higher- this past week when she has been in pain.  Usual levels in am- some 130s, but usually 111-117.  This past, in 160s.    Novolog 6 units 3x/day with meals.  Basaglar 22 units in am  Ozempic 0.5mg once weekly    Pulmonary- doing fine, though worse when she's under stress.  She puts some oxygen on and takes some deep breaths.    Lipids- lowered lipitor to 5mg/d in 2/20.  Will need to recheck at some point.      BP Readings from Last 2 Encounters:   03/04/20 106/73   02/28/20 117/56     Hemoglobin A1C (%)   Date Value   01/21/2020 7.0 (H)   10/22/2019 6.3 (H)     LDL Cholesterol Calculated (mg/dL)   Date Value   01/21/2020 1   04/09/2019 29           How many servings of fruits and vegetables do you eat daily?  4 or  more    On average, how many sweetened beverages do you drink each day (Examples: soda, juice, sweet tea, etc.  Do NOT count diet or artificially sweetened beverages)?   0    How many days per week do you exercise enough to make your heart beat faster? 3 or less    How many minutes a day do you exercise enough to make your heart beat faster? 20 - 29    How many days per week do you miss taking your medication? 0         Patient Active Problem List   Diagnosis     Temporomandibular joint disorder     Diverticulitis of colon     Disorder of bone and cartilage     Osteoarthritis     Alcohol abuse, in remission     Restless legs syndrome (RLS)     Major depressive disorder, recurrent episode, moderate (H)     Essential hypertension with goal blood pressure less than 140/90     Advanced directives, counseling/discussion     Colouterine fistula     Permanent atrial fibrillation     Left ventricular hypertrophy     Health Care Home     Insomnia     Joint pains     Allergic reaction caused by a drug - likely plaquenil     Breast fibroadenoma     DVT (deep venous thrombosis) (H)     Fatty liver disease, nonalcoholic     Rib pain     Neck mass     Gastroesophageal reflux disease without esophagitis     Enlarged lymph node     Sjogren's syndrome with lung involvement (H)     ANGELICA (obstructive sleep apnea)     ILD (interstitial lung disease) (H)     Lower GI bleed     (HFpEF) heart failure with preserved ejection fraction (H)     Type 2 diabetes mellitus with hyperglycemia, with long-term current use of insulin (H)     Heart failure, chronic, with acute decompensation (H)     Hyperlipidemia LDL goal <100     Long term current use of anticoagulant therapy     Inflammatory arthritis     Follicular bronchiolitis (H)     CKD (chronic kidney disease) stage 3, GFR 30-59 ml/min (H)     Closed head injury     Urge incontinence of urine     Urinary urgency     Weakness     Past Surgical History:   Procedure Laterality Date     BACK  SURGERY       BIOPSY BREAST  02    Biopsy Left Breast     C APPENDECTOMY  's?     C NONSPECIFIC PROCEDURE      exploratory abd lap, adhesions, resolved RLQ pain, diverticulitis episodes     CARDIAC SURGERY       CHOLECYSTECTOMY  ?     COLECTOMY LEFT  2011    Procedure:COLECTOMY LEFT; Laparoscopic mobilization of splenic flexture, sigmoid colectomy, coloprotoscopy, loop illeostomy; Surgeon:CK CASTANEDA; Location:[facility]     HYSTERECTOMY TOTAL ABDOMINAL, BILATERAL SALPINGO-OOPHORECTOMY, COMBINED  2011    Procedure:COMBINED HYSTERECTOMY TOTAL ABDOMINAL, BILATERAL SALPINGO-OOPHORECTOMY; total abdominal hysterectomy, bilateral salpingo-oophorectomy; Surgeon:ALETA MANUEL; Location:[facility]     INSERT STENT URETER  2011    Procedure:INSERT STENT URETER; Placement of Bilateral Ureteral Stents ; Surgeon:PRANEETH BRYANT; Location:[facility]     SIGMOIDOSCOPY FLEXIBLE  11/3/2011    Procedure:SIGMOIDOSCOPY FLEXIBLE; Flexible Sigmoidoscopy; Surgeon:CK CASTANEDA; Location:[facility]     TAKEDOWN ILEOSTOMY  2012    Procedure:TAKEDOWN ILEOSTOMY; Takedown Loop Ileostomy ; Surgeon:CK CASTANEDA; Location:[facility]       Social History     Tobacco Use     Smoking status: Former Smoker     Packs/day: 0.50     Years: 10.00     Pack years: 5.00     Types: Cigarettes     Last attempt to quit: 2011     Years since quittin.7     Smokeless tobacco: Never Used     Tobacco comment:  ppd   Substance Use Topics     Alcohol use: No     Alcohol/week: 0.0 standard drinks     Comment: In recovery beginning      Family History   Problem Relation Age of Onset     C.A.D. Mother 63        MI- first at age 63     Heart Disease Mother      Hypertension Mother      Cerebrovascular Disease Mother      Hyperlipidemia Mother      Alcohol/Drug Father      Alzheimer Disease Father      Dementia Father      Hypertension Father      Hyperlipidemia Father      Diabetes Sister      C.A.D. Sister 52         Minor MI- age 50's     Heart Disease Sister      Hypertension Sister      Hypertension Sister      Hypertension Brother      Cancer - colorectal Sister 48        Late 40's early 50's     Prostate Cancer Brother 74        Dx'd age 74     Gastrointestinal Disease Sister         Diverticulitis     Gastrointestinal Disease Brother         Diverticulitis     Lipids Sister      Lipids Sister      Parkinsonism Brother      Diabetes Sister      Heart Disease Sister         CHF     Cancer Sister         lung, smoker     Substance Abuse Sister      Substance Abuse Brother      Asthma Sister      Cancer Sister      Breast Cancer Daughter      Prostate Cancer Brother      Hyperlipidemia Brother      Diabetes Other      Hypertension Other          Current Outpatient Medications   Medication Sig Dispense Refill     acetaminophen (TYLENOL) 500 MG tablet Take 1 tablet (500 mg) by mouth nightly as needed       acyclovir (ZOVIRAX) 5 % external ointment Apply topically 6 times daily As needed for outbreaks (Patient not taking: Reported on 3/17/2020) 15 g 3     albuterol (PROAIR HFA, PROVENTIL HFA, VENTOLIN HFA) 108 (90 BASE) MCG/ACT inhaler Inhale 2 puffs into the lungs every 6 hours 1 Inhaler 3     atorvastatin (LIPITOR) 10 MG tablet TAKE 1/2 TABLET BY MOUTH DAILY 45 tablet 0     azithromycin (ZITHROMAX) 250 MG tablet Take 1 tablet (250 mg) by mouth daily 30 tablet 11     CEVIMELINE 30 MG PO capsule TAKE 1 CAPSULE (30 MG) BY MOUTH 3 TIMES DAILY 270 capsule 2     escitalopram (LEXAPRO) 20 MG tablet TAKE 1 TABLET BY MOUTH EVERY DAY 90 tablet 1     fluticasone (FLONASE) 50 MCG/ACT nasal spray Spray 1-2 sprays into both nostrils daily as needed for allergies 18.2 mL 11     fluticasone-vilanterol (BREO ELLIPTA) 100-25 MCG/INH inhaler Inhale 1 puff into the lungs daily 1 Inhaler 11     gabapentin (NEURONTIN) 300 MG capsule Take 1 capsule (300 mg) by mouth 3 times daily 90 capsule 0     HYDROcodone-acetaminophen (NORCO) 5-325 MG tablet Take  1/2 tab every 6 hours as needed for severe pain only. 15 tablet 0     hydroxychloroquine (PLAQUENIL) 200 MG tablet TAKE 2 TABLETS (400 MG) BY MOUTH DAILY ANNUAL PLAQUENIL TOXICITY EYE SCREENING REQUIRED. 180 tablet 1     insulin glargine (BASAGLAR KWIKPEN) 100 UNIT/ML pen INJECT 22 UNITS SUBCUTANEOUS DAILY (TO REPLACE LANTUS) 3 mL 1     ketoconazole (NIZORAL) 2 % external cream Apply topically 2 times daily as needed for itching 60 g 1     lidocaine (LIDODERM) 5 % patch Apply patch to painful area for up to 12 h within a 24 h period.  Remove after 12 hours. (Patient not taking: Reported on 3/17/2020) 30 patch 5     metoprolol succinate ER (TOPROL-XL) 25 MG 24 hr tablet Take 0.5 tablets (12.5 mg) by mouth 2 times daily Take 50 mg by mouth in combination with 12.5 for a total of 62.5 twice a day 90 tablet 3     metoprolol succinate ER (TOPROL-XL) 50 MG 24 hr tablet Take 50 mg by mouth in combination with 12.5 for a total of 62.5 twice a day 180 tablet 3     montelukast (SINGULAIR) 10 MG tablet TAKE 1 TABLET BY MOUTH EVERYDAY AT BEDTIME 30 tablet 0     NOVOLOG FLEXPEN 100 UNIT/ML soln INJECT 12 UNITS SUBCUTANEOUS 3 TIMES DAILY (WITH MEALS) (Patient taking differently: Inject 6 Units Subcutaneous 3 times daily (with meals) ) 15 mL 1     order for DME Equipment being ordered: Oxygen  Pulsed oxygen portable tank  Rate: 4LNC  Diagnosis: ILD  Duration: Lifetime -99 1 Device 1     OZEMPIC, 0.25 OR 0.5 MG/DOSE, 2 MG/1.5ML SOPN INJECT 0.5 MG SUBCUTANEOUS ONCE A WEEK 4.5 mL 0     potassium chloride ER (KLOR-CON) 20 MEQ CR tablet Take 2 tablets (40 mEq) by mouth every morning AND 1 tablet (20 mEq) every evening. 270 tablet 3     predniSONE (DELTASONE) 5 MG tablet Take 1 tablet (5 mg) by mouth daily 90 tablet 1     rivaroxaban ANTICOAGULANT (XARELTO ANTICOAGULANT) 20 MG PO TABS tablet Take 1 tablet (20 mg) by mouth daily (with dinner) 90 tablet 2     spironolactone (ALDACTONE) 25 MG tablet Take 2 tablets (50 mg) by mouth daily  180 tablet 3     torsemide (DEMADEX) 10 MG tablet TAKE ONE TAB (10 MG) WITH ONE 20 MG TAB TO = 30 MG DAILY IN AFTERNOON. 90 tablet 3     torsemide (DEMADEX) 20 MG tablet TAKE 2 TABS (40 MG) IN AM DAILY AND TAKE 1 TAB BY MOUTH IN THE AFTERNOON ALONG W/ A 10 MG  tablet 4     traZODone (DESYREL) 50 MG tablet TAKE 0.5-1.5 TABLETS (25-75 MG) BY MOUTH NIGHTLY AS NEEDED FOR SLEEP 135 tablet 0     vitamin D3 (CHOLECALCIFEROL) 2000 units (50 mcg) tablet Take 1 tablet (2,000 Units) by mouth daily 90 tablet 3     Allergies   Allergen Reactions     Amoxicillin-Pot Clavulanate      Augmentin Nausea and Vomiting     Codeine Nausea and Vomiting     Codeine      PN: LW Reaction: HIVES     Penicillins Nausea and Vomiting     PN: LW Reaction: GI Upset     Phenobarbital Itching     Phenobarbital      Seasonal Allergies      Recent Labs   Lab Test 03/04/20  0749 03/03/20  1940/20  1116 01/15/20  1805  10/22/19  1416  08/16/19  1334  04/09/19  1017  05/23/18  1028   A1C  --   --  7.0*  --   --  6.3*  --   --   --  7.2*   < > 6.9*   LDL  --   --  1  --   --   --   --   --   --  29  --  21   HDL  --   --  91  --   --   --   --   --   --  59  --  53   TRIG  --   --  114  --   --   --   --   --   --  148  --  113   ALT  --  21  --  21  --   --   --  16  --   --   --  21   CR 0.92 0.94  --  1.01   < > 1.10*   < > 1.05*   < > 0.85   < > 0.90   GFRESTIMATED 59* 57*  --  53*   < > 48*   < > 50*   < > 65   < > 61   GFRESTBLACK 68 66  --  61   < > 55*   < > 58*   < > 76   < > 74   POTASSIUM 3.0* 3.4  --   --    < > 4.1   < >  --    < > 3.9   < > 3.5   TSH  --   --   --   --   --   --   --   --   --  2.72  --  2.42    < > = values in this interval not displayed.      BP Readings from Last 3 Encounters:   03/04/20 106/73   02/28/20 117/56   02/27/20 131/64    Wt Readings from Last 3 Encounters:   03/04/20 90.4 kg (199 lb 3.2 oz)   02/28/20 90.7 kg (200 lb)   02/24/20 90.3 kg (199 lb)             Reviewed and updated as needed this  visit by Provider  Tobacco  Allergies  Meds  Problems  Med Hx  Surg Hx  Fam Hx         Review of Systems   Constitutional, HEENT, cardiovascular, pulmonary, gi and gu systems are negative, except as otherwise noted.       Objective   Reported vitals:  There were no vitals taken for this visit.   healthy, alert and no distress  PSYCH: Alert and oriented times 3; coherent speech, normal   rate and volume, able to articulate logical thoughts, able   to abstract reason, no tangential thoughts, no hallucinations   or delusions  Her affect is normal  RESP: No cough, no audible wheezing, able to talk in full sentences  Remainder of exam unable to be completed due to telephone visits    Diagnostic Test Results:  Labs reviewed in Epic        Assessment/Plan:    ICD-10-CM    1. Type 2 diabetes mellitus with hyperglycemia, with long-term current use of insulin (H)  E11.65 **A1C FUTURE anytime    Z79.4 **Basic metabolic panel FUTURE anytime     Albumin Random Urine Quantitative with Creat Ratio     **TSH with free T4 reflex FUTURE anytime   2. Lumbar back pain with radiculopathy affecting right lower extremity  M54.16    3. Essential hypertension with goal blood pressure less than 140/90  I10 **Basic metabolic panel FUTURE anytime     Albumin Random Urine Quantitative with Creat Ratio   4. CKD (chronic kidney disease) stage 3, GFR 30-59 ml/min (H)  N18.3 **Basic metabolic panel FUTURE anytime     Albumin Random Urine Quantitative with Creat Ratio   5. Inflammatory arthritis  M19.90    6. ILD (interstitial lung disease) (H)  J84.9    7. Hyperlipidemia LDL goal <100  E78.5       DM II/Lipids- in good control for the most part, with AM glucoses in 110s, but they did spike this past week when her pain was uncontrollable.  Discussed this is not unusual, but would monitor to see that they are improving as her pain is improved yesterday and today.  Rec f/u with MTM to discuss further if glucoses remain higher.  A1C 7.0 in 1/20.   Will get another set off DM labs when she goes in to be seen at [facility] on 6/2/20 (though in afternoon, so unable to get fasting lipids).    LBP with R radiculopathy- pt has been seeing sports medicine and had injection which helped for 2-3 wks, but then wore off.  MRI done. Pt has appt to see spine orthopedics (Dr. See) on 6/2/20 at the [facility] for f/u.    Inflammatory Arthritis- pt has been having other 'flares of pain' that are intense and 10/10 on the pain scale.  Reports the recent one from T-Th of this week was her usual R shooting sciatic pain, but also in her left leg.  She is short on details, but thinks it was more of the 'inflammatory pain'.  She also notes another episode when her knee was intensely painful, red, hot and swollen for about a day.  Recommended she go back in her dairy and collate these episodes of intense joint pains, especially those with any swelling, and discuss them with Dr. Lopez.  She is due for f/u with her now regardless.  After talking with her, see that she does have an appt set up with her on 6//11/20.    HTN/CKD- pt's BP has been mixed, but few ER visits for most of her recent recordings, and some low-normal.  GFR was 59 at last check at ER, but potassium was low at 3.0 after IVFs.  Will recheck BMP and get a urine microalbumin prior to [facility] visit 6/2/20.    Lipids- due for lipid recheck after lowering lipitor to 5mg/d (from 10mg/d) after getting an LDL of 1 earlier this year.  Needs fasting recheck, but will have to wait.    Pulmonary- quick check-in- pt states breathing has been stable.  Did not get to rest of chronic isssues/meds.  Will focus on those at next visit.  She also has a cardiology appt scheduled for 8/27/20.    Rec f/u in 7-8/20 for f/u chronic cares, sooner if concens or lab abnls.    Will see if team can help her set up lab appt prior to ortho visit on 6/2/20.  Would see if she can do a lab appt at 2:30pm.      Return in about 10 weeks (around 7/24/2020) for  Routine Visit/Chronic Cares/Med Check.      Phone call duration: 36 minutes    Ilene Tristan MD

## 2020-05-15 NOTE — Clinical Note
Please help pt set up a lab appt at [facility] 6/2/20 at ~2:30pm - prior to her 3pm appt with ortho, and let her know.  Thanks! CW

## 2020-05-15 NOTE — TELEPHONE ENCOUNTER
Message from CW:       Ilene Tristan MD  [facility] [place] Triage               Please help pt set up a lab appt at [facility] 6/2/20 at ~2:30pm - prior to her 3pm appt with ortho, and let her know.   Thanks!  CW      Ilene Tristan MD  [facility] [place] Triage               Please help pt set up a lab appt at [facility] 6/2/20 at ~2:30pm - prior to her 3pm appt with ortho, and let her know.   Thanks!  CW

## 2020-05-16 ASSESSMENT — ANXIETY QUESTIONNAIRES: GAD7 TOTAL SCORE: 3

## 2020-05-18 DIAGNOSIS — M35.02 SJOGREN'S SYNDROME WITH LUNG INVOLVEMENT (H): ICD-10-CM

## 2020-05-18 DIAGNOSIS — J84.9 ILD (INTERSTITIAL LUNG DISEASE) (H): ICD-10-CM

## 2020-05-19 RX ORDER — MONTELUKAST SODIUM 10 MG/1
TABLET ORAL
Qty: 30 TABLET | Refills: 5 | Status: SHIPPED | OUTPATIENT
Start: 2020-05-19 | End: 2020-09-28

## 2020-05-19 NOTE — TELEPHONE ENCOUNTER
"Prescription approved per [facility] [place].  Sophia Hemphill RN    Requested Prescriptions   Signed Prescriptions Disp Refills    montelukast (SINGULAIR) 10 MG tablet 30 tablet 5     Sig: TAKE 1 TABLET BY MOUTH EVERYDAY AT BEDTIME       Leukotriene Inhibitors Protocol Passed - 5/18/2020  1:26 PM        Passed - Patient is age 12 or older     If patient is under 16, ok to refill using age based dosing.           Passed - Recent (12 mo) or future (30 days) visit within the authorizing provider's specialty     Patient has had an office visit with the authorizing provider or a provider within the authorizing providers department within the previous 12 mos or has a future within next 30 days. See \"Patient Info\" tab in inbasket, or \"Choose Columns\" in Meds & Orders section of the refill encounter.              Passed - Medication is active on med list           "

## 2020-05-21 ENCOUNTER — TELEPHONE (OUTPATIENT)
Dept: PHARMACY | Facility: CLINIC | Age: 80
End: 2020-05-21

## 2020-05-21 NOTE — TELEPHONE ENCOUNTER
Called pt to schedule appointment. No answer, message left.   Sabrina Meek, PharmD, JAMES, BCACP  [facility] Pharmacist, [facility]

## 2020-05-26 ENCOUNTER — TRANSFERRED RECORDS (OUTPATIENT)
Dept: HEALTH INFORMATION MANAGEMENT | Facility: CLINIC | Age: 80
End: 2020-05-26

## 2020-06-03 DIAGNOSIS — Z79.4 TYPE 2 DIABETES MELLITUS WITH HYPERGLYCEMIA, WITH LONG-TERM CURRENT USE OF INSULIN (H): ICD-10-CM

## 2020-06-03 DIAGNOSIS — E11.65 TYPE 2 DIABETES MELLITUS WITH HYPERGLYCEMIA, WITH LONG-TERM CURRENT USE OF INSULIN (H): ICD-10-CM

## 2020-06-03 DIAGNOSIS — J30.89 SEASONAL ALLERGIC RHINITIS DUE TO OTHER ALLERGIC TRIGGER: ICD-10-CM

## 2020-06-03 NOTE — TELEPHONE ENCOUNTER
CW,    Routing refill request to provider for review/approval because:  Drug not active on patient's medication list  Per medication history list was discontinued 3/17/2020/visit with MTM (mediation reconciliation clean up)    Please advise.  Thanks,  Sophia Hemphill RN

## 2020-06-04 NOTE — TELEPHONE ENCOUNTER
Could you call pt and see if she requested refill, and if she wants to restart on it?  Fine sending it if she wants to restart it.  Reviewed 3/17/20 MTM notes, unsure if it was taken off med list because she wasn't using it at the time, or for another reason.  Thanks- CW

## 2020-06-04 NOTE — TELEPHONE ENCOUNTER
DIAGNOSIS: per referral   APPOINTMENT DATE: 6.19.20   NOTES STATUS DETAILS   OFFICE NOTE from referring provider Internal 4.26.20, 3.23.20, 2.24.20, + more with  Dr. William El  [facility]   OFFICE NOTE from other specialist N/A    DISCHARGE SUMMARY from hospital Internal 3.3-3.4.20  Dr. William Madrid  [facility]   DISCHARGE REPORT from the ER N/A    OPERATIVE REPORT N/A    MEDICATION LIST Internal    IMPLANT RECORD/STICKER N/A    LABS     CBC/DIFF Internal 3.4.20   CULTURES N/A    INJECTIONS DONE IN RADIOLOGY Internal 2.28.20  RT L5-S1 TFESI  [facility]   MRI Internal 2.8.20  MR Lumbar Spine  [facility]   CT SCAN Internal 7.25.18  CT Lumbar Spine  [facility]   XRAYS (IMAGES & REPORTS) Internal 3.4.20  Xray Pelvis and Hip  [facility]    2.28.20  Xray Lumbar EI  [facility]    7.12.18  Xray Lumbar spine  [facility]   TUMOR     PATHOLOGY  Slides & report N/A

## 2020-06-09 RX ORDER — INSULIN GLARGINE 100 [IU]/ML
INJECTION, SOLUTION SUBCUTANEOUS
Qty: 15 ML | Refills: 0 | Status: SHIPPED | OUTPATIENT
Start: 2020-06-09 | End: 2020-08-17

## 2020-06-09 RX ORDER — LORATADINE 10 MG/1
TABLET ORAL
Start: 2020-06-09

## 2020-06-09 NOTE — TELEPHONE ENCOUNTER
Called pt  She's not on Claritin - denied    Needs Basaglar though   States she was given just 1 pen (3mL)  Sent in Rx for 1 box  Lydia HALEY RN

## 2020-06-10 DIAGNOSIS — E11.65 TYPE 2 DIABETES MELLITUS WITH HYPERGLYCEMIA, WITH LONG-TERM CURRENT USE OF INSULIN (H): ICD-10-CM

## 2020-06-10 DIAGNOSIS — Z79.4 TYPE 2 DIABETES MELLITUS WITH HYPERGLYCEMIA, WITH LONG-TERM CURRENT USE OF INSULIN (H): ICD-10-CM

## 2020-06-10 RX ORDER — SEMAGLUTIDE 1.34 MG/ML
INJECTION, SOLUTION SUBCUTANEOUS
Qty: 4.5 ML | Refills: 0 | Status: SHIPPED | OUTPATIENT
Start: 2020-06-10 | End: 2020-09-01

## 2020-06-10 NOTE — TELEPHONE ENCOUNTER
PN,   Please advise on refill in CW's absence  Routing refill request to provider for review/approval because:  Drug not on the [facility] refill protocol   Lydia HALEY RN

## 2020-06-11 ENCOUNTER — VIRTUAL VISIT (OUTPATIENT)
Dept: RHEUMATOLOGY | Facility: CLINIC | Age: 80
End: 2020-06-11
Attending: INTERNAL MEDICINE
Payer: MEDICARE

## 2020-06-11 DIAGNOSIS — M54.16 LUMBAR BACK PAIN WITH RADICULOPATHY AFFECTING RIGHT LOWER EXTREMITY: ICD-10-CM

## 2020-06-11 DIAGNOSIS — M19.90 INFLAMMATORY ARTHRITIS: Primary | ICD-10-CM

## 2020-06-11 DIAGNOSIS — M85.80 OSTEOPENIA, UNSPECIFIED LOCATION: ICD-10-CM

## 2020-06-11 DIAGNOSIS — Z79.52 CURRENT CHRONIC USE OF SYSTEMIC STEROIDS: ICD-10-CM

## 2020-06-11 RX ORDER — HYDROCODONE BITARTRATE AND ACETAMINOPHEN 5; 325 MG/1; MG/1
1 TABLET ORAL EVERY 12 HOURS PRN
Qty: 60 TABLET | Refills: 0 | Status: SHIPPED | OUTPATIENT
Start: 2020-06-11 | End: 2020-07-29

## 2020-06-11 RX ORDER — PREDNISONE 5 MG/1
TABLET ORAL
Qty: 90 TABLET | Refills: 1 | Status: SHIPPED | OUTPATIENT
Start: 2020-06-11 | End: 2020-07-02

## 2020-06-11 RX ORDER — ALENDRONATE SODIUM 70 MG/1
70 TABLET ORAL
Qty: 12 TABLET | Refills: 1 | Status: SHIPPED | OUTPATIENT
Start: 2020-06-11 | End: 2020-12-04

## 2020-06-11 ASSESSMENT — PAIN SCALES - GENERAL: PAINLEVEL: MODERATE PAIN (5)

## 2020-06-11 NOTE — PROGRESS NOTES
[facility]  Rheumatology Clinic Virtual Visit Note  Zulma Lopez MD  DOS:  2020   Date of last visit: 1/15/2020    Name: Betty Tee  MRN: [id number]  Age: 80 year old  : 1940  Reason for visit: Urgent visit for B/L hip pain, primary Sjogren's syndrome    (this is a video visit due to COVID-19 outbreak), patient agreed      #B/L hip trochanteric bursitis  # Sjogren's syndrome since  with borderline +RG, +SSA, and lip biopsy focus score of 1.  # prednisone-responsive polyarthritis  # Follicular bronchiolitis, prior mild ILD  # Osteopenia on DEXA 2019 with T score=-2.1 with decline in bone density  # Chronic prednisone use  # DM  # A fib     Assessment and Plan:  Primary Sjogren's syndrome with ILD   Sister Sita returns with stable PFTs and improvement on most recent chest CT showing bronchiolitis, but no ILD. Completed pulmonary rehab in 2019 where she was able to exercise without oxygen.     On HCQ since 2019 with significant  Improvement of joint pain. Is here today to follow up and due to recent pain in bilateral hips, suspect PMR flare up.     Tolerates HCQ well. Current prednisone dose is 5 mg daily. Her dry mouth is tolerable. Breathing is stable. No other complaints today.      Plan:  2019: Prednisone taper: 6/7 mg every other day x one month, 6 mg a day x one month then 5 mg a day and stay on 5 mg a day. Try evoxac 30 mg three times a day (start with one tab a day) for dry mouth; risks were discussed. Repeat DEXA bone density, since 2017 DEXA showed bone density and is on chronic steroid, was not interested in fosamax in the past. Urology referral. Labs today. Second dose of shingrix is due between 2019-2020. Eye exam on plaquenil. Rib x-ray. Return in 4-5 months.     (1/15/2019): Bilateral bursitis injection today for lateral hip pain. Referral to physical therapy for bursitis. Continue lidocaine patches as needed. Referral made to endocrinology due to decreasing  bone density and history of Fosamax therapy. Labs today. Decrease prednisone back to 5 mg daily with plan to stay there for the time being.      Today's 6/11/2020 plan: Has severe pain, stiffness around the hips. Current prednisone dose is 5 mg every day, it  Was 7.5 mg qd at last visit. Suspect PMR flare up. Has osteopenia.    Norco 1 tab every 12 hours as needed for severe pain    Norco has acetaminophen or tylenol in it (325 mg in each tablet), therefore make sure to limit your tylenol intake, max tylenol 3 gram a day    Labs on  6/19/2020    Start prednisone taper on  6/19/2020: 4tab=20 mg qd x 5 days, 3tab=15 mg qd x 5 days, 2tab=10 mg qd x 5 days, then 1 tab=5 mg qd and stay on it    Start fosamax once a week; risks were discussed    Video visit in 4 weeks        Video Start Time: 11:11 AM  Video End Time: 11:49 AM    Orders Placed This Encounter   Procedures     ALT     Albumin level     AST     CBC with platelets differential     Creatinine     Complement C4     Complement C3     CRP inflammation     Erythrocyte sedimentation rate auto     UA with Microscopic reflex to Culture     Protein  random urine with Creat Ratio     Creatinine random urine     Protein electrophoresis     Cryoglobulin quantitative     Vitamin D Deficiency     Protein Immunofixation Serum     Protein immunofixation urine     Cyclic Citrullinated Peptide Antibody IgG     Rheumatoid factor     Zulma Lopez MD      Subjective:  Betty Tee is a 79 year old female with a history of primary Sjogren's syndrome who presents for follow-up in the setting of recent pain flare in her bilateral hips x several weeks. The pain has been more intense and longer lasting than previous joint pain. Lidocaine patches have helped, and she increased her prednisone (20 mg x 5 days, then 7.5 mg through today) per rheumatology but it did not help the pain. She reports this recent pain flare has made her feel discouraged and down.     HPI:   Betty VILLALOBOS  Randal is a 79 year old female with a history of primary Sjogren's syndrome who presents for follow-up. She was diagnosed with Sjogren's syndrome in 2015 with borderline +RG, +SSA, and lip biopsy focus score of 1. Associated ILD and joint pain are prednisone-responsive which support the diagnosis. Ocular staining score has been deferred given other sources of diagnosis.    Interval history: 6/7/18   Today, the patient states she is feeling short of breath as she did not bring her oxygen tank with her. She states she has been feeling a bit more short of breath at home while going up and down her basement steps recently. She last saw her pulmonologist, Dr. Walsh, on 1/10/2018 and does not have recent pulmonary function tests. She has not needed to increase her oxygen levels at home, noting she has always used 4 liters, which seems to be sufficient for her. When she is out of the house she frequently leaves her oxygen tank at home. She does currently have an irritating, non-productive cough that began approximately one week ago with associated postnasal drip. She has been experiencing night sweats and infrequent chills this week but denies any measured fevers.     She denies any joint pains today, but does state she had a flare of joint pain, specifically in her right knee, a few weeks ago. The pain hit very intensely prior to resolving.      With regards to starting rituximab, she voices she discussed this with her primary care provider who does not feel this is a good idea.     Interval history: 3/8/18  Patient reported doing rather well but did mention some trouble with intermittent arthralgias. We discussed consideration of rituximab given problems stemming from long-term prednisone use and she elected to think about this option. Plan was made for continuation of 10mg of prednisone daily for the time being. Recommended starting Fosamax, 70mg, once per week, however she was hesitant about side effects.      8/16/2019: Former pt of Dr. Stringer is here today to transfer care. Last night, woke up with pain over R ribs. Similar pain happened in 7/2019, self-resolved. Her x-rays at that time were suggestive of questionable rib Fx. On  mg bid+prednisone 6/8 mg every other day (5/13/2019). Dry mouth is bothersome, using OTC biotene but CPAP makes it worse. Report losing bladder control on occasions.    (1/15/2020): Patient returns today with several weeks of intense bilateral hip pain. It's more intense and has been longer-lasting than previous joint pain. She was on a prednisone taper and was at 5 mg daily when this pain started. Per rheumatology, she went up to 20 mg prednisone daily x 5 days, now 7.5 mg daily x 7 days but the prednisone increase did not help the pain. Lidocaine patches have helped her hip pain. Her dry mouth has been tolerable, she's taking evoxac once per day. No changes in breathing.      Today 6/11/2020: Has severe pain, stiffness around the hips. Current prednisone dose is 5 mg every day, it  Was 7.5 mg qd at last visit.     Review of Systems:   A comprehensive ROS was done. Positives are per HPI.      Active Medications:     Outpatient Medications Prior to Visit   Medication Sig Dispense Refill     acetaminophen (TYLENOL) 500 MG tablet Take 1,000 mg by mouth every 8 hours as needed (max 6 tablets/24 hours, 2 tablets/dose)        acyclovir (ZOVIRAX) 5 % external ointment Apply topically 6 times daily As needed for outbreaks (Patient not taking: Reported on 6/23/2020) 15 g 3     albuterol (PROAIR HFA, PROVENTIL HFA, VENTOLIN HFA) 108 (90 BASE) MCG/ACT inhaler Inhale 2 puffs into the lungs every 6 hours 1 Inhaler 3     atorvastatin (LIPITOR) 10 MG tablet TAKE 1/2 TABLET BY MOUTH DAILY 45 tablet 0     azithromycin (ZITHROMAX) 250 MG tablet Take 1 tablet (250 mg) by mouth daily 30 tablet 11     CEVIMELINE 30 MG PO capsule TAKE 1 CAPSULE (30 MG) BY MOUTH 3 TIMES DAILY 270 capsule 2      fluticasone (FLONASE) 50 MCG/ACT nasal spray Spray 1-2 sprays into both nostrils daily as needed for allergies 18.2 mL 11     fluticasone-vilanterol (BREO ELLIPTA) 100-25 MCG/INH inhaler Inhale 1 puff into the lungs daily 1 Inhaler 11     gabapentin (NEURONTIN) 300 MG capsule Take 1 capsule (300 mg) by mouth 3 times daily 90 capsule 0     hydroxychloroquine (PLAQUENIL) 200 MG tablet TAKE 2 TABLETS (400 MG) BY MOUTH DAILY ANNUAL PLAQUENIL TOXICITY EYE SCREENING REQUIRED. 180 tablet 1     insulin glargine (BASAGLAR KWIKPEN) 100 UNIT/ML pen INJECT 22 UNITS SUBCUTANEOUS DAILY (TO REPLACE LANTUS) 15 mL 0     ketoconazole (NIZORAL) 2 % external cream Apply topically 2 times daily as needed for itching (Patient not taking: Reported on 6/23/2020) 60 g 1     metoprolol succinate ER (TOPROL-XL) 25 MG 24 hr tablet Take 0.5 tablets (12.5 mg) by mouth 2 times daily Take 50 mg by mouth in combination with 12.5 for a total of 62.5 twice a day 90 tablet 3     metoprolol succinate ER (TOPROL-XL) 50 MG 24 hr tablet Take 50 mg by mouth in combination with 12.5 for a total of 62.5 twice a day 180 tablet 3     montelukast (SINGULAIR) 10 MG tablet TAKE 1 TABLET BY MOUTH EVERYDAY AT BEDTIME 30 tablet 5     order for DME Equipment being ordered: Oxygen  Pulsed oxygen portable tank  Rate: 4LNC  Diagnosis: ILD  Duration: Lifetime -99 1 Device 1     OZEMPIC, 0.25 OR 0.5 MG/DOSE, 2 MG/1.5ML SOPN INJECT 0.5 MG SUBCUTANEOUS ONCE A WEEK 4.5 mL 0     potassium chloride ER (KLOR-CON) 20 MEQ CR tablet Take 2 tablets (40 mEq) by mouth every morning AND 1 tablet (20 mEq) every evening. 270 tablet 3     rivaroxaban ANTICOAGULANT (XARELTO ANTICOAGULANT) 20 MG PO TABS tablet Take 1 tablet (20 mg) by mouth daily (with dinner) 90 tablet 2     spironolactone (ALDACTONE) 25 MG tablet Take 2 tablets (50 mg) by mouth daily 180 tablet 3     torsemide (DEMADEX) 10 MG tablet TAKE ONE TAB (10 MG) WITH ONE 20 MG TAB TO = 30 MG DAILY IN AFTERNOON. 90 tablet 3      torsemide (DEMADEX) 20 MG tablet TAKE 2 TABS (40 MG) IN AM DAILY AND TAKE 1 TAB BY MOUTH IN THE AFTERNOON ALONG W/ A 10 MG  tablet 4     traZODone (DESYREL) 50 MG tablet TAKE 0.5-1.5 TABLETS (25-75 MG) BY MOUTH NIGHTLY AS NEEDED FOR SLEEP 135 tablet 0     vitamin D3 (CHOLECALCIFEROL) 2000 units (50 mcg) tablet Take 1 tablet (2,000 Units) by mouth daily 90 tablet 3     escitalopram (LEXAPRO) 20 MG tablet TAKE 1 TABLET BY MOUTH EVERY DAY 90 tablet 1     NOVOLOG FLEXPEN 100 UNIT/ML soln INJECT 12 UNITS SUBCUTANEOUS 3 TIMES DAILY (WITH MEALS) (Patient taking differently: Inject 6 Units Subcutaneous 3 times daily (with meals) ) 15 mL 1     lidocaine (LIDODERM) 5 % patch Apply patch to painful area for up to 12 h within a 24 h period.  Remove after 12 hours. 30 patch 5     HYDROcodone-acetaminophen (NORCO) 5-325 MG tablet Take 1/2 tab every 6 hours as needed for severe pain only. 15 tablet 0     HYDROcodone-acetaminophen (NORCO) 5-325 MG tablet Take 1 tablet by mouth every 6 hours as needed for severe pain 15 tablet 0     predniSONE (DELTASONE) 5 MG tablet Take 1 tablet (5 mg) by mouth daily 90 tablet 1     No facility-administered medications prior to visit.        Allergies:   Augmentin\  Codeine  Phenobarbital  Seasonal allergies      Past Medical History:  Alcohol abuse, in remission.   Allergic rhinitis.   Antiplatelet long-term use.   Atrial fibrillation.   Cardiomegaly.   Osteopenia.   Diverticulosis.   Benign essential hypertension.   GERD.   Insomnia.   Irregular heart beat.   Lumbago.   Major depressive disorder, recurrent episode, moderate.   ANGELICA.  Osteoarthrosis.   Sjogren's syndrome.   Sleep apnea.   Tobacco use disorder.   TMJ disorder.   RLS.  Major depressive disorder.   Hypertension.   Sciatica.   Colouterine fistula.   Left ventricular hypertrophy.   Breat fibroadenoma.   DVT.   Fatty liver disease, nonalcoholic.   Rib pain.   Neck mass.   Interstitial lung disease.   Acute and chronic respiratory  failure with hypoxia.   Type II diabetes mellitus.   Hyperlipidemia.   Inflammatory arthritis.      Past Surgical History:  Back surgery.   Left breast biopsy.   Appendectomy.   Exploratory laparotomy.   Cardiac surgery.   Cholecystectomy.   Left colectomy.   Total abdominal hysterectomy with bilateral salpingo-oophorectomy.   Insert ureter stent.   Flexible sigmoidoscopy.   Ileostomy takedown.     Family History:   Mother: Positive for CAD, heart disease, hypertension, cerebrovascular disease, hyperlipidemia.   Father: Positive for alcohol/drug abuse, Alzheimer disease, dementia, hypertension, hyperlipidemia.   Sister: Positive for CAD, hypertension, and colorectal cancer.   Sister: Positive for hypertension and hyperlipidemia.   Sister: Positive for diabetes, lung cancer, asthma, and heart disease.   Brother: Positive for Parkinsonism and substance abuse.   Brother: Positive for hypertension, diverticulitis, and prostate cancer.   Daughter: Positive for breast cancer.        Social History:   She is a former smoker; quit in 2011. She has a history of alcohol abuse but stopped drinking in 1986.      Physical Exam:   Constitutional: Well-developed, appearing stated age; cooperative  Eyes: Normal EOM, conjunctiva, sclera  MS: no synovitis  Skin: No alopecia, rash  Neuro: grossly non-focal  Psych: Normal affect.    Images:  CT Chest w/o contrast (7/25/18):  Findings remain most consistent with small airway disease  and/or nonclassifiable interstitial lung disease and are not  significantly changed from the March 2017 comparison.    Per radiology.    Laboratory:   RHEUM RESULTS Latest Ref Rng & Units 3/4/2020 3/4/2020 3/4/2020   COMPLEMENT C3 81 - 157 mg/dL - - -   COMPLEMENT C4 13 - 39 mg/dL - - -   SED RATE 0 - 30 mm/h - - -   CRP, INFLAMMATION 0.0 - 8.0 mg/L - - -   CK TOTAL 30 - 225 U/L - - -   RHEUMATOID FACTOR <20 IU/mL - - -   RG SCREEN BY EIA <1.0 - - -   AST 0 - 45 U/L - - -   ALT 0 - 50 U/L - - -   ALBUMIN  3.4 - 5.0 g/dL - - -   WBC 4.0 - 11.0 10e9/L - 10.6 10.1   RBC 3.8 - 5.2 10e12/L - 4.38 4.06   HGB 11.7 - 15.7 g/dL 12.6 13.2 12.3   HCT 35.0 - 47.0 % - 41.3 38.4   MCV 78 - 100 fl - 94 95   MCHC 31.5 - 36.5 g/dL - 32.0 32.0   RDW 10.0 - 15.0 % - 13.9 14.1    - 450 10e9/L - 203 206   CREATININE 0.52 - 1.04 mg/dL - 0.92 -   GFR ESTIMATE, IF BLACK >60 mL/min/[1.73:m2] - 68 -   GFR ESTIMATE >60 mL/min/[1.73:m2] - 59(L) -    - 1,616 mg/dL - - -   IGA 84 - 499 mg/dL - - -   IGM 35 - 242 mg/dL - - -       Rheumatoid Factor   Date Value Ref Range Status   07/24/2015 <20 <20 IU/mL Final   ,  ,   Cyclic Cit Pept IgG/IgA   Date Value Ref Range Status   07/24/2015 <20  Interpretation:  Negative   <20 UNITS Final   ,  ,   Scleroderma Antibody Scl-70 CECILIA IgG   Date Value Ref Range Status   07/24/2015  0.0 - 0.9 AI Final    <0.2  Negative   Antibody index (AI) values reflect qualitative changes in antibody   concentration that cannot be directly associated with clinical condition or   disease state.       SSA (Ro) (CECILIA) Antibody, IgG   Date Value Ref Range Status   07/24/2015 1.6 (H) 0.0 - 0.9 AI Final     Comment:     Positive   Antibody index (AI) values reflect qualitative changes in antibody   concentration that cannot be directly associated with clinical condition or   disease state.       SSB (La) (CECILIA) Antibody, IgG   Date Value Ref Range Status   07/24/2015  0.0 - 0.9 AI Final    <0.2  Negative   Antibody index (AI) values reflect qualitative changes in antibody   concentration that cannot be directly associated with clinical condition or   disease state.       ,  ,   RG Screen by EIA   Date Value Ref Range Status   07/24/2015 <1.0  Interpretation:  Negative   <1.0 Final   ,  ,  ,  ,  ,  ,  ,  ,  ,  ,  ,  ,  ,  ,  ,  ,  ,  ,   Neutrophil Cytoplasmic IgG Antibody   Date Value Ref Range Status   07/24/2015   Final    <1:20  Reference range: <1:20  (Note)  The ANCA IFA is <1:20; therefore, no further testing  will  be performed.  INTERPRETIVE INFORMATION: Anti-Neutrophil Cyto Ab, IgG  Neutrophil Cytoplasmic Antibodies (C-ANCA = granular  cytoplasmic staining, P-ANCA = perinuclear staining) are  found in the serum of over 90 percent of patients with  certain necrotizing systemic vasculitides, and usually in  less than 5 percent of patients with collagen vascular  disease or arthritis.  Performed by [facility],  [address] [place] [id number] [phone]  [email or web address], Burke Mckeon MD, Lab. Director       ,  ,  ,  ,  ,  ,  ,   IGG   Date Value Ref Range Status   07/24/2015 1320 695 - 1620 mg/dL Final   ,  ,  ,  ,  ,   Scleroderma Antibody Scl-70 CECILIA IgG   Date Value Ref Range Status   07/24/2015  0.0 - 0.9 AI Final    <0.2  Negative   Antibody index (AI) values reflect qualitative changes in antibody   concentration that cannot be directly associated with clinical condition or   disease state.

## 2020-06-11 NOTE — LETTER
"2020       RE: Betty Tee  [address]  [facility] [address]     Dear Colleague,    Thank you for referring your patient, Betty Tee, to the [facility] RHEUMATOLOGY at [facility]. Please see a copy of my visit note below.    Betty Tee is a 80 year old female who is being evaluated via a billable video visit.      The patient has been notified of following:     \"This video visit will be conducted via a call between you and your physician/provider. We have found that certain health care needs can be provided without the need for an in-person physical exam.  This service lets us provide the care you need with a video conversation.  If a prescription is necessary we can send it directly to your pharmacy.  If lab work is needed we can place an order for that and you can then stop by our lab to have the test done at a later time.    Video visits are billed at different rates depending on your insurance coverage.  Please reach out to your insurance provider with any questions.    If during the course of the call the physician/provider feels a video visit is not appropriate, you will not be charged for this service.\"    Patient has given verbal consent for Video visit? Yes    Will anyone else be joining your video visit? No        Video-Visit Details    Type of service:  Video Visit    Video Start Time: 11:11 AM  Video End Time: 11:49 AM    Originating Location (pt. Location): Home    Distant Location (provider location):  [facility] RHEUMATOLOGY     Platform used for Video Visit: [facility]    Zulma Lopez MD          [facility]  Rheumatology Clinic Virtual Visit Note  Zulma Lopez MD  DOS:  2020   Date of last visit: 1/15/2020    Name: Betty Tee  MRN: [id number]  Age: 80 year old  : 1940  Reason for visit: Urgent visit for B/L hip pain, primary Sjogren's syndrome    (this is a video visit due to COVID-19 outbreak), patient " agreed      #B/L hip trochanteric bursitis  # Sjogren's syndrome since 2015 with borderline +RG, +SSA, and lip biopsy focus score of 1.  # prednisone-responsive polyarthritis  # Follicular bronchiolitis, prior mild ILD  # Osteopenia on DEXA 8/2019 with T score=-2.1 with decline in bone density  # Chronic prednisone use  # DM  # A fib     Assessment and Plan:  Primary Sjogren's syndrome with ILD   Sister Sita returns with stable PFTs and improvement on most recent chest CT showing bronchiolitis, but no ILD. Completed pulmonary rehab in 2/2019 where she was able to exercise without oxygen.     On HCQ since 4/2019 with significant  Improvement of joint pain. Is here today to follow up and due to recent pain in bilateral hips, suspect PMR flare up.     Tolerates HCQ well. Current prednisone dose is 5 mg daily. Her dry mouth is tolerable. Breathing is stable. No other complaints today.      Plan:  8/16/2019: Prednisone taper: 6/7 mg every other day x one month, 6 mg a day x one month then 5 mg a day and stay on 5 mg a day. Try evoxac 30 mg three times a day (start with one tab a day) for dry mouth; risks were discussed. Repeat DEXA bone density, since 2017 DEXA showed bone density and is on chronic steroid, was not interested in fosamax in the past. Urology referral. Labs today. Second dose of shingrix is due between 9/2019-1/2020. Eye exam on plaquenil. Rib x-ray. Return in 4-5 months.     (1/15/2019): Bilateral bursitis injection today for lateral hip pain. Referral to physical therapy for bursitis. Continue lidocaine patches as needed. Referral made to endocrinology due to decreasing bone density and history of Fosamax therapy. Labs today. Decrease prednisone back to 5 mg daily with plan to stay there for the time being.      Today's 6/11/2020 plan: Has severe pain, stiffness around the hips. Current prednisone dose is 5 mg every day, it  Was 7.5 mg qd at last visit. Suspect PMR flare up. Has osteopenia.    Norco  1 tab every 12 hours as needed for severe pain    Norco has acetaminophen or tylenol in it (325 mg in each tablet), therefore make sure to limit your tylenol intake, max tylenol 3 gram a day    Labs on  6/19/2020    Start prednisone taper on  6/19/2020: 4tab=20 mg qd x 5 days, 3tab=15 mg qd x 5 days, 2tab=10 mg qd x 5 days, then 1 tab=5 mg qd and stay on it    Start fosamax once a week; risks were discussed    Video visit in 4 weeks        Video Start Time: 11:11 AM  Video End Time: 11:49 AM    Orders Placed This Encounter   Procedures     ALT     Albumin level     AST     CBC with platelets differential     Creatinine     Complement C4     Complement C3     CRP inflammation     Erythrocyte sedimentation rate auto     UA with Microscopic reflex to Culture     Protein  random urine with Creat Ratio     Creatinine random urine     Protein electrophoresis     Cryoglobulin quantitative     Vitamin D Deficiency     Protein Immunofixation Serum     Protein immunofixation urine     Cyclic Citrullinated Peptide Antibody IgG     Rheumatoid factor     Zulma Lopez MD      Subjective:  Betty Tee is a 79 year old female with a history of primary Sjogren's syndrome who presents for follow-up in the setting of recent pain flare in her bilateral hips x several weeks. The pain has been more intense and longer lasting than previous joint pain. Lidocaine patches have helped, and she increased her prednisone (20 mg x 5 days, then 7.5 mg through today) per rheumatology but it did not help the pain. She reports this recent pain flare has made her feel discouraged and down.     HPI:   Betty Tee is a 79 year old female with a history of primary Sjogren's syndrome who presents for follow-up. She was diagnosed with Sjogren's syndrome in 2015 with borderline +RG, +SSA, and lip biopsy focus score of 1. Associated ILD and joint pain are prednisone-responsive which support the diagnosis. Ocular staining score has been  deferred given other sources of diagnosis.    Interval history: 6/7/18   Today, the patient states she is feeling short of breath as she did not bring her oxygen tank with her. She states she has been feeling a bit more short of breath at home while going up and down her basement steps recently. She last saw her pulmonologist, Dr. Walsh, on 1/10/2018 and does not have recent pulmonary function tests. She has not needed to increase her oxygen levels at home, noting she has always used 4 liters, which seems to be sufficient for her. When she is out of the house she frequently leaves her oxygen tank at home. She does currently have an irritating, non-productive cough that began approximately one week ago with associated postnasal drip. She has been experiencing night sweats and infrequent chills this week but denies any measured fevers.     She denies any joint pains today, but does state she had a flare of joint pain, specifically in her right knee, a few weeks ago. The pain hit very intensely prior to resolving.      With regards to starting rituximab, she voices she discussed this with her primary care provider who does not feel this is a good idea.     Interval history: 3/8/18  Patient reported doing rather well but did mention some trouble with intermittent arthralgias. We discussed consideration of rituximab given problems stemming from long-term prednisone use and she elected to think about this option. Plan was made for continuation of 10mg of prednisone daily for the time being. Recommended starting Fosamax, 70mg, once per week, however she was hesitant about side effects.     8/16/2019: Former pt of Dr. Stringer is here today to transfer care. Last night, woke up with pain over R ribs. Similar pain happened in 7/2019, self-resolved. Her x-rays at that time were suggestive of questionable rib Fx. On  mg bid+prednisone 6/8 mg every other day (5/13/2019). Dry mouth is bothersome, using OTC biotene but CPAP  makes it worse. Report losing bladder control on occasions.    (1/15/2020): Patient returns today with several weeks of intense bilateral hip pain. It's more intense and has been longer-lasting than previous joint pain. She was on a prednisone taper and was at 5 mg daily when this pain started. Per rheumatology, she went up to 20 mg prednisone daily x 5 days, now 7.5 mg daily x 7 days but the prednisone increase did not help the pain. Lidocaine patches have helped her hip pain. Her dry mouth has been tolerable, she's taking evoxac once per day. No changes in breathing.      Today 6/11/2020: Has severe pain, stiffness around the hips. Current prednisone dose is 5 mg every day, it  Was 7.5 mg qd at last visit.     Review of Systems:   A comprehensive ROS was done. Positives are per HPI.      Active Medications:     Outpatient Medications Prior to Visit   Medication Sig Dispense Refill     acetaminophen (TYLENOL) 500 MG tablet Take 1,000 mg by mouth every 8 hours as needed (max 6 tablets/24 hours, 2 tablets/dose)        acyclovir (ZOVIRAX) 5 % external ointment Apply topically 6 times daily As needed for outbreaks (Patient not taking: Reported on 6/23/2020) 15 g 3     albuterol (PROAIR HFA, PROVENTIL HFA, VENTOLIN HFA) 108 (90 BASE) MCG/ACT inhaler Inhale 2 puffs into the lungs every 6 hours 1 Inhaler 3     atorvastatin (LIPITOR) 10 MG tablet TAKE 1/2 TABLET BY MOUTH DAILY 45 tablet 0     azithromycin (ZITHROMAX) 250 MG tablet Take 1 tablet (250 mg) by mouth daily 30 tablet 11     CEVIMELINE 30 MG PO capsule TAKE 1 CAPSULE (30 MG) BY MOUTH 3 TIMES DAILY 270 capsule 2     fluticasone (FLONASE) 50 MCG/ACT nasal spray Spray 1-2 sprays into both nostrils daily as needed for allergies 18.2 mL 11     fluticasone-vilanterol (BREO ELLIPTA) 100-25 MCG/INH inhaler Inhale 1 puff into the lungs daily 1 Inhaler 11     gabapentin (NEURONTIN) 300 MG capsule Take 1 capsule (300 mg) by mouth 3 times daily 90 capsule 0      hydroxychloroquine (PLAQUENIL) 200 MG tablet TAKE 2 TABLETS (400 MG) BY MOUTH DAILY ANNUAL PLAQUENIL TOXICITY EYE SCREENING REQUIRED. 180 tablet 1     insulin glargine (BASAGLAR KWIKPEN) 100 UNIT/ML pen INJECT 22 UNITS SUBCUTANEOUS DAILY (TO REPLACE LANTUS) 15 mL 0     ketoconazole (NIZORAL) 2 % external cream Apply topically 2 times daily as needed for itching (Patient not taking: Reported on 6/23/2020) 60 g 1     metoprolol succinate ER (TOPROL-XL) 25 MG 24 hr tablet Take 0.5 tablets (12.5 mg) by mouth 2 times daily Take 50 mg by mouth in combination with 12.5 for a total of 62.5 twice a day 90 tablet 3     metoprolol succinate ER (TOPROL-XL) 50 MG 24 hr tablet Take 50 mg by mouth in combination with 12.5 for a total of 62.5 twice a day 180 tablet 3     montelukast (SINGULAIR) 10 MG tablet TAKE 1 TABLET BY MOUTH EVERYDAY AT BEDTIME 30 tablet 5     order for DME Equipment being ordered: Oxygen  Pulsed oxygen portable tank  Rate: 4LNC  Diagnosis: ILD  Duration: Lifetime -99 1 Device 1     OZEMPIC, 0.25 OR 0.5 MG/DOSE, 2 MG/1.5ML SOPN INJECT 0.5 MG SUBCUTANEOUS ONCE A WEEK 4.5 mL 0     potassium chloride ER (KLOR-CON) 20 MEQ CR tablet Take 2 tablets (40 mEq) by mouth every morning AND 1 tablet (20 mEq) every evening. 270 tablet 3     rivaroxaban ANTICOAGULANT (XARELTO ANTICOAGULANT) 20 MG PO TABS tablet Take 1 tablet (20 mg) by mouth daily (with dinner) 90 tablet 2     spironolactone (ALDACTONE) 25 MG tablet Take 2 tablets (50 mg) by mouth daily 180 tablet 3     torsemide (DEMADEX) 10 MG tablet TAKE ONE TAB (10 MG) WITH ONE 20 MG TAB TO = 30 MG DAILY IN AFTERNOON. 90 tablet 3     torsemide (DEMADEX) 20 MG tablet TAKE 2 TABS (40 MG) IN AM DAILY AND TAKE 1 TAB BY MOUTH IN THE AFTERNOON ALONG W/ A 10 MG  tablet 4     traZODone (DESYREL) 50 MG tablet TAKE 0.5-1.5 TABLETS (25-75 MG) BY MOUTH NIGHTLY AS NEEDED FOR SLEEP 135 tablet 0     vitamin D3 (CHOLECALCIFEROL) 2000 units (50 mcg) tablet Take 1 tablet (2,000  Units) by mouth daily 90 tablet 3     escitalopram (LEXAPRO) 20 MG tablet TAKE 1 TABLET BY MOUTH EVERY DAY 90 tablet 1     NOVOLOG FLEXPEN 100 UNIT/ML soln INJECT 12 UNITS SUBCUTANEOUS 3 TIMES DAILY (WITH MEALS) (Patient taking differently: Inject 6 Units Subcutaneous 3 times daily (with meals) ) 15 mL 1     lidocaine (LIDODERM) 5 % patch Apply patch to painful area for up to 12 h within a 24 h period.  Remove after 12 hours. 30 patch 5     HYDROcodone-acetaminophen (NORCO) 5-325 MG tablet Take 1/2 tab every 6 hours as needed for severe pain only. 15 tablet 0     HYDROcodone-acetaminophen (NORCO) 5-325 MG tablet Take 1 tablet by mouth every 6 hours as needed for severe pain 15 tablet 0     predniSONE (DELTASONE) 5 MG tablet Take 1 tablet (5 mg) by mouth daily 90 tablet 1     No facility-administered medications prior to visit.        Allergies:   Augmentin\  Codeine  Phenobarbital  Seasonal allergies      Past Medical History:  Alcohol abuse, in remission.   Allergic rhinitis.   Antiplatelet long-term use.   Atrial fibrillation.   Cardiomegaly.   Osteopenia.   Diverticulosis.   Benign essential hypertension.   GERD.   Insomnia.   Irregular heart beat.   Lumbago.   Major depressive disorder, recurrent episode, moderate.   ANGELICA.  Osteoarthrosis.   Sjogren's syndrome.   Sleep apnea.   Tobacco use disorder.   TMJ disorder.   RLS.  Major depressive disorder.   Hypertension.   Sciatica.   Colouterine fistula.   Left ventricular hypertrophy.   Breat fibroadenoma.   DVT.   Fatty liver disease, nonalcoholic.   Rib pain.   Neck mass.   Interstitial lung disease.   Acute and chronic respiratory failure with hypoxia.   Type II diabetes mellitus.   Hyperlipidemia.   Inflammatory arthritis.      Past Surgical History:  Back surgery.   Left breast biopsy.   Appendectomy.   Exploratory laparotomy.   Cardiac surgery.   Cholecystectomy.   Left colectomy.   Total abdominal hysterectomy with bilateral salpingo-oophorectomy.   Insert  ureter stent.   Flexible sigmoidoscopy.   Ileostomy takedown.     Family History:   Mother: Positive for CAD, heart disease, hypertension, cerebrovascular disease, hyperlipidemia.   Father: Positive for alcohol/drug abuse, Alzheimer disease, dementia, hypertension, hyperlipidemia.   Sister: Positive for CAD, hypertension, and colorectal cancer.   Sister: Positive for hypertension and hyperlipidemia.   Sister: Positive for diabetes, lung cancer, asthma, and heart disease.   Brother: Positive for Parkinsonism and substance abuse.   Brother: Positive for hypertension, diverticulitis, and prostate cancer.   Daughter: Positive for breast cancer.        Social History:   She is a former smoker; quit in 2011. She has a history of alcohol abuse but stopped drinking in 1986.      Physical Exam:   Constitutional: Well-developed, appearing stated age; cooperative  Eyes: Normal EOM, conjunctiva, sclera  MS: no synovitis  Skin: No alopecia, rash  Neuro: grossly non-focal  Psych: Normal affect.    Images:  CT Chest w/o contrast (7/25/18):  Findings remain most consistent with small airway disease  and/or nonclassifiable interstitial lung disease and are not  significantly changed from the March 2017 comparison.    Per radiology.    Laboratory:   RHEUM RESULTS Latest Ref Rng & Units 3/4/2020 3/4/2020 3/4/2020   COMPLEMENT C3 81 - 157 mg/dL - - -   COMPLEMENT C4 13 - 39 mg/dL - - -   SED RATE 0 - 30 mm/h - - -   CRP, INFLAMMATION 0.0 - 8.0 mg/L - - -   CK TOTAL 30 - 225 U/L - - -   RHEUMATOID FACTOR <20 IU/mL - - -   RG SCREEN BY EIA <1.0 - - -   AST 0 - 45 U/L - - -   ALT 0 - 50 U/L - - -   ALBUMIN 3.4 - 5.0 g/dL - - -   WBC 4.0 - 11.0 10e9/L - 10.6 10.1   RBC 3.8 - 5.2 10e12/L - 4.38 4.06   HGB 11.7 - 15.7 g/dL 12.6 13.2 12.3   HCT 35.0 - 47.0 % - 41.3 38.4   MCV 78 - 100 fl - 94 95   MCHC 31.5 - 36.5 g/dL - 32.0 32.0   RDW 10.0 - 15.0 % - 13.9 14.1    - 450 10e9/L - 203 206   CREATININE 0.52 - 1.04 mg/dL - 0.92 -   GFR  ESTIMATE, IF BLACK >60 mL/min/[1.73:m2] - 68 -   GFR ESTIMATE >60 mL/min/[1.73:m2] - 59(L) -    - 1,616 mg/dL - - -   IGA 84 - 499 mg/dL - - -   IGM 35 - 242 mg/dL - - -       Rheumatoid Factor   Date Value Ref Range Status   07/24/2015 <20 <20 IU/mL Final   ,  ,   Cyclic Cit Pept IgG/IgA   Date Value Ref Range Status   07/24/2015 <20  Interpretation:  Negative   <20 UNITS Final   ,  ,   Scleroderma Antibody Scl-70 CECILIA IgG   Date Value Ref Range Status   07/24/2015  0.0 - 0.9 AI Final    <0.2  Negative   Antibody index (AI) values reflect qualitative changes in antibody   concentration that cannot be directly associated with clinical condition or   disease state.       SSA (Ro) (CECILIA) Antibody, IgG   Date Value Ref Range Status   07/24/2015 1.6 (H) 0.0 - 0.9 AI Final     Comment:     Positive   Antibody index (AI) values reflect qualitative changes in antibody   concentration that cannot be directly associated with clinical condition or   disease state.       SSB (La) (CECILIA) Antibody, IgG   Date Value Ref Range Status   07/24/2015  0.0 - 0.9 AI Final    <0.2  Negative   Antibody index (AI) values reflect qualitative changes in antibody   concentration that cannot be directly associated with clinical condition or   disease state.       ,  ,   RG Screen by EIA   Date Value Ref Range Status   07/24/2015 <1.0  Interpretation:  Negative   <1.0 Final   ,  ,  ,  ,  ,  ,  ,  ,  ,  ,  ,  ,  ,  ,  ,  ,  ,  ,   Neutrophil Cytoplasmic IgG Antibody   Date Value Ref Range Status   07/24/2015   Final    <1:20  Reference range: <1:20  (Note)  The ANCA IFA is <1:20; therefore, no further testing will  be performed.  INTERPRETIVE INFORMATION: Anti-Neutrophil Cyto Ab, IgG  Neutrophil Cytoplasmic Antibodies (C-ANCA = granular  cytoplasmic staining, P-ANCA = perinuclear staining) are  found in the serum of over 90 percent of patients with  certain necrotizing systemic vasculitides, and usually in  less than 5 percent of patients  with collagen vascular  disease or arthritis.  Performed by [facility],  [address] [place] [address] [phone]  [email or web address] Burke Mckeon MD, Lab. Director       ,  ,  ,  ,  ,  ,  ,   IGG   Date Value Ref Range Status   07/24/2015 1320 695 - 1620 mg/dL Final   ,  ,  ,  ,  ,   Scleroderma Antibody Scl-70 CECILIA IgG   Date Value Ref Range Status   07/24/2015  0.0 - 0.9 AI Final    <0.2  Negative   Antibody index (AI) values reflect qualitative changes in antibody   concentration that cannot be directly associated with clinical condition or   disease state.

## 2020-06-11 NOTE — PATIENT INSTRUCTIONS
Norco 1 tab every 12 hours as needed for severe pain    Norco has acetaminophen or tylenol in it (325 mg in each tablet), therefore make sure to limit your tylenol intake, max tylenol 3 gram a day    Labs on  6/19/2020    Start prednisone taper on  6/19/2020: 4tab=20 mg qd x 5 days, 3tab=15 mg qd x 5 days, 2tab=10 mg qd x 5 days, then 1 tab=5 mg qd and stay on it    Start fosamax once a week    Video visit inn 4 weeks

## 2020-06-11 NOTE — PROGRESS NOTES
"Betty Tee is a 80 year old female who is being evaluated via a billable video visit.      The patient has been notified of following:     \"This video visit will be conducted via a call between you and your physician/provider. We have found that certain health care needs can be provided without the need for an in-person physical exam.  This service lets us provide the care you need with a video conversation.  If a prescription is necessary we can send it directly to your pharmacy.  If lab work is needed we can place an order for that and you can then stop by our lab to have the test done at a later time.    Video visits are billed at different rates depending on your insurance coverage.  Please reach out to your insurance provider with any questions.    If during the course of the call the physician/provider feels a video visit is not appropriate, you will not be charged for this service.\"    Patient has given verbal consent for Video visit? Yes    Will anyone else be joining your video visit? No        Video-Visit Details    Type of service:  Video Visit    Video Start Time: 11:11 AM  Video End Time: 11:49 AM    Originating Location (pt. Location): Home    Distant Location (provider location):  [facility] RHEUMATOLOGY     Platform used for Video Visit: Nevin Lopez MD        "

## 2020-06-14 DIAGNOSIS — E11.69 TYPE 2 DIABETES MELLITUS WITH OTHER SPECIFIED COMPLICATION (H): ICD-10-CM

## 2020-06-14 DIAGNOSIS — F33.1 MAJOR DEPRESSIVE DISORDER, RECURRENT EPISODE, MODERATE (H): ICD-10-CM

## 2020-06-14 NOTE — TELEPHONE ENCOUNTER
"NOVOLOG 100 UNIT/ML FLEXPEN   Last Written Prescription Date:  11/21/2019  Last Fill Quantity: 15ML,  # refills: 1   Last office visit: 1/21/2020 with prescribing provider:  JANA   Future Office Visit:  Nothing at this time scheduled.        ESCITALOPRAM 20 MG TABLET   Last Written Prescription Date:  12/20/2019  Last Fill Quantity: 90,  # refills: 1   Last office visit: 1/21/2020 with prescribing provider:  JANA   Future Office Visit:  Nothing at this time scheduled.        Requested Prescriptions   Pending Prescriptions Disp Refills     NOVOLOG FLEXPEN 100 UNIT/ML soln [Pharmacy Med Name: NOVOLOG 100 UNIT/ML FLEXPEN]  1     Sig: INJECT 12 UNITS SUBCUTANEOUS 3 TIMES DAILY (WITH MEALS)       Short Acting Insulin Protocol Passed - 6/14/2020  9:40 AM        Passed - Serum creatinine on file in past 12 months     Recent Labs   Lab Test 03/04/20  0749  04/02/17  2213   CR 0.92   < >  --    CREAT  --   --  0.6    < > = values in this interval not displayed.       Ok to refill medication if creatinine is low          Passed - HgbA1C in past 3 or 6 months     If HgbA1C is 8 or greater, it needs to be on file within the past 3 months.  If less than 8, must be on file within the past 6 months.     Recent Labs   Lab Test 01/21/20  1116   A1C 7.0*             Passed - Medication is active on med list        Passed - Patient is age 18 or older        Passed - Recent (6 mo) or future (30 days) visit within the authorizing provider's specialty     Patient had office visit in the last 6 months or has a visit in the next 30 days with authorizing provider or within the authorizing provider's specialty.  See \"Patient Info\" tab in inbasket, or \"Choose Columns\" in Meds & Orders section of the refill encounter.               escitalopram (LEXAPRO) 20 MG tablet [Pharmacy Med Name: ESCITALOPRAM 20 MG TABLET] 90 tablet 1     Sig: TAKE 1 TABLET BY MOUTH EVERY DAY       SSRIs Protocol Passed - 6/14/2020  9:40 AM        Passed - PHQ-9 score less " "than 5 in past 6 months     Please review last PHQ-9 score.           Passed - Medication is active on med list        Passed - Patient is age 18 or older        Passed - No active pregnancy on record        Passed - No positive pregnancy test in last 12 months        Passed - Recent (6 mo) or future (30 days) visit within the authorizing provider's specialty     Patient had office visit in the last 6 months or has a visit in the next 30 days with authorizing provider or within the authorizing provider's specialty.  See \"Patient Info\" tab in inbasket, or \"Choose Columns\" in Meds & Orders section of the refill encounter.               "

## 2020-06-15 RX ORDER — INSULIN ASPART 100 [IU]/ML
INJECTION, SOLUTION INTRAVENOUS; SUBCUTANEOUS
Qty: 15 ML | Refills: 1 | Status: SHIPPED | OUTPATIENT
Start: 2020-06-15 | End: 2020-08-31

## 2020-06-15 RX ORDER — ESCITALOPRAM OXALATE 20 MG/1
TABLET ORAL
Qty: 90 TABLET | Refills: 0 | Status: SHIPPED | OUTPATIENT
Start: 2020-06-15 | End: 2020-09-21

## 2020-06-15 NOTE — TELEPHONE ENCOUNTER
Prescription approved per [facility] Refill Protocol.  Lydia HALEY RN

## 2020-06-17 ENCOUNTER — TELEPHONE (OUTPATIENT)
Dept: FAMILY MEDICINE | Facility: CLINIC | Age: 80
End: 2020-06-17
Payer: COMMERCIAL

## 2020-06-17 NOTE — TELEPHONE ENCOUNTER
Received form(s) from [employer] for prescriber action plan.  Placed form(s) in/on AS's box.  Forms need to be signed and faxed to number listed on form..    Call pt to verify form was sent: No  Copy needs to be sent for scanning after completion: Yes

## 2020-06-18 DIAGNOSIS — M54.50 LUMBAR PAIN: Primary | ICD-10-CM

## 2020-06-19 ENCOUNTER — PRE VISIT (OUTPATIENT)
Dept: ORTHOPEDICS | Facility: CLINIC | Age: 80
End: 2020-06-19

## 2020-06-19 ENCOUNTER — ANCILLARY PROCEDURE (OUTPATIENT)
Dept: GENERAL RADIOLOGY | Facility: CLINIC | Age: 80
End: 2020-06-19
Attending: ORTHOPAEDIC SURGERY
Payer: MEDICARE

## 2020-06-19 ENCOUNTER — OFFICE VISIT (OUTPATIENT)
Dept: ORTHOPEDICS | Facility: CLINIC | Age: 80
End: 2020-06-19
Attending: FAMILY MEDICINE
Payer: MEDICARE

## 2020-06-19 VITALS — WEIGHT: 199 LBS | BODY MASS INDEX: 31.98 KG/M2 | HEIGHT: 66 IN

## 2020-06-19 DIAGNOSIS — I10 ESSENTIAL HYPERTENSION WITH GOAL BLOOD PRESSURE LESS THAN 140/90: ICD-10-CM

## 2020-06-19 DIAGNOSIS — N18.30 CKD (CHRONIC KIDNEY DISEASE) STAGE 3, GFR 30-59 ML/MIN (H): ICD-10-CM

## 2020-06-19 DIAGNOSIS — M19.90 INFLAMMATORY ARTHRITIS: ICD-10-CM

## 2020-06-19 DIAGNOSIS — Z79.52 CURRENT CHRONIC USE OF SYSTEMIC STEROIDS: ICD-10-CM

## 2020-06-19 DIAGNOSIS — Z79.4 TYPE 2 DIABETES MELLITUS WITH HYPERGLYCEMIA, WITH LONG-TERM CURRENT USE OF INSULIN (H): ICD-10-CM

## 2020-06-19 DIAGNOSIS — Z11.59 ENCOUNTER FOR SCREENING FOR OTHER VIRAL DISEASES: Primary | ICD-10-CM

## 2020-06-19 DIAGNOSIS — M54.50 LUMBAR PAIN: Primary | ICD-10-CM

## 2020-06-19 DIAGNOSIS — M85.80 OSTEOPENIA, UNSPECIFIED LOCATION: ICD-10-CM

## 2020-06-19 DIAGNOSIS — M54.16 LUMBAR BACK PAIN WITH RADICULOPATHY AFFECTING RIGHT LOWER EXTREMITY: ICD-10-CM

## 2020-06-19 DIAGNOSIS — E11.65 TYPE 2 DIABETES MELLITUS WITH HYPERGLYCEMIA, WITH LONG-TERM CURRENT USE OF INSULIN (H): ICD-10-CM

## 2020-06-19 LAB
ALBUMIN SERPL-MCNC: 3.2 G/DL (ref 3.4–5)
ALBUMIN UR-MCNC: NEGATIVE MG/DL
ALT SERPL W P-5'-P-CCNC: 12 U/L (ref 0–50)
ANION GAP SERPL CALCULATED.3IONS-SCNC: 6 MMOL/L (ref 3–14)
APPEARANCE UR: CLEAR
AST SERPL W P-5'-P-CCNC: 10 U/L (ref 0–45)
BACTERIA #/AREA URNS HPF: ABNORMAL /HPF
BASOPHILS # BLD AUTO: 0 10E9/L (ref 0–0.2)
BASOPHILS NFR BLD AUTO: 0.3 %
BILIRUB UR QL STRIP: NEGATIVE
BUN SERPL-MCNC: 12 MG/DL (ref 7–30)
CALCIUM SERPL-MCNC: 9.2 MG/DL (ref 8.5–10.1)
CHLORIDE SERPL-SCNC: 108 MMOL/L (ref 94–109)
CO2 SERPL-SCNC: 25 MMOL/L (ref 20–32)
COLOR UR AUTO: ABNORMAL
CREAT SERPL-MCNC: 0.83 MG/DL (ref 0.52–1.04)
CREAT UR-MCNC: 9 MG/DL
CRP SERPL-MCNC: 88.3 MG/L (ref 0–8)
DIFFERENTIAL METHOD BLD: NORMAL
EOSINOPHIL # BLD AUTO: 0.1 10E9/L (ref 0–0.7)
EOSINOPHIL NFR BLD AUTO: 1.1 %
ERYTHROCYTE [DISTWIDTH] IN BLOOD BY AUTOMATED COUNT: 14.6 % (ref 10–15)
ERYTHROCYTE [SEDIMENTATION RATE] IN BLOOD BY WESTERGREN METHOD: 59 MM/H (ref 0–30)
GFR SERPL CREATININE-BSD FRML MDRD: 67 ML/MIN/{1.73_M2}
GLUCOSE SERPL-MCNC: 138 MG/DL (ref 70–99)
GLUCOSE UR STRIP-MCNC: NEGATIVE MG/DL
HBA1C MFR BLD: 6.8 % (ref 0–5.6)
HCT VFR BLD AUTO: 37.8 % (ref 35–47)
HGB BLD-MCNC: 11.9 G/DL (ref 11.7–15.7)
HGB UR QL STRIP: NEGATIVE
IMM GRANULOCYTES # BLD: 0.1 10E9/L (ref 0–0.4)
IMM GRANULOCYTES NFR BLD: 0.8 %
KETONES UR STRIP-MCNC: NEGATIVE MG/DL
LEUKOCYTE ESTERASE UR QL STRIP: NEGATIVE
LYMPHOCYTES # BLD AUTO: 0.9 10E9/L (ref 0.8–5.3)
LYMPHOCYTES NFR BLD AUTO: 9.7 %
MCH RBC QN AUTO: 30.1 PG (ref 26.5–33)
MCHC RBC AUTO-ENTMCNC: 31.5 G/DL (ref 31.5–36.5)
MCV RBC AUTO: 96 FL (ref 78–100)
MICROALBUMIN UR-MCNC: 9 MG/L
MICROALBUMIN/CREAT UR: 103.31 MG/G CR (ref 0–25)
MONOCYTES # BLD AUTO: 1.1 10E9/L (ref 0–1.3)
MONOCYTES NFR BLD AUTO: 11.6 %
NEUTROPHILS # BLD AUTO: 7.4 10E9/L (ref 1.6–8.3)
NEUTROPHILS NFR BLD AUTO: 76.5 %
NITRATE UR QL: NEGATIVE
NRBC # BLD AUTO: 0 10*3/UL
NRBC BLD AUTO-RTO: 0 /100
PH UR STRIP: 6 PH (ref 5–7)
PLATELET # BLD AUTO: 209 10E9/L (ref 150–450)
POTASSIUM SERPL-SCNC: 4 MMOL/L (ref 3.4–5.3)
PROT UR-MCNC: 0.07 G/L
PROT/CREAT 24H UR: 0.78 G/G CR (ref 0–0.2)
RBC # BLD AUTO: 3.95 10E12/L (ref 3.8–5.2)
RBC #/AREA URNS AUTO: 1 /HPF (ref 0–2)
SODIUM SERPL-SCNC: 139 MMOL/L (ref 133–144)
SOURCE: ABNORMAL
SP GR UR STRIP: 1 (ref 1–1.03)
TSH SERPL DL<=0.005 MIU/L-ACNC: 2.38 MU/L (ref 0.4–4)
UROBILINOGEN UR STRIP-MCNC: 0 MG/DL (ref 0–2)
WBC # BLD AUTO: 9.7 10E9/L (ref 4–11)
WBC #/AREA URNS AUTO: <1 /HPF (ref 0–5)

## 2020-06-19 PROCEDURE — 86431 RHEUMATOID FACTOR QUANT: CPT | Performed by: FAMILY MEDICINE

## 2020-06-19 PROCEDURE — 84165 PROTEIN E-PHORESIS SERUM: CPT | Performed by: FAMILY MEDICINE

## 2020-06-19 PROCEDURE — 00000402 ZZHCL STATISTIC TOTAL PROTEIN: Performed by: FAMILY MEDICINE

## 2020-06-19 PROCEDURE — 82306 VITAMIN D 25 HYDROXY: CPT | Performed by: FAMILY MEDICINE

## 2020-06-19 PROCEDURE — 82595 ASSAY OF CRYOGLOBULIN: CPT | Performed by: FAMILY MEDICINE

## 2020-06-19 PROCEDURE — 82784 ASSAY IGA/IGD/IGG/IGM EACH: CPT | Performed by: FAMILY MEDICINE

## 2020-06-19 PROCEDURE — 86160 COMPLEMENT ANTIGEN: CPT | Performed by: FAMILY MEDICINE

## 2020-06-19 PROCEDURE — 86335 IMMUNFIX E-PHORSIS/URINE/CSF: CPT | Performed by: INTERNAL MEDICINE

## 2020-06-19 PROCEDURE — 86334 IMMUNOFIX E-PHORESIS SERUM: CPT | Performed by: FAMILY MEDICINE

## 2020-06-19 PROCEDURE — 86200 CCP ANTIBODY: CPT | Performed by: FAMILY MEDICINE

## 2020-06-19 ASSESSMENT — MIFFLIN-ST. JEOR: SCORE: 1389.41

## 2020-06-19 NOTE — PROGRESS NOTES
Spine Surgery Consultation    REFERRING PHYSICIAN: Referred Self   PRIMARY CARE PHYSICIAN: Max Martins           Chief Complaint:   RECHECK (follow up for in clinic evaluation )      History of Present Illness:  Symptom Profile Including: location of symptoms, onset, severity, exacerbating/alleviating factors, previous treatments:        The patient presents today for evaluation of right greater than left radicular complaints which have been ongoing for a number of months.  The symptoms are a burning type sensation with occasional feeling of weakness predominantly in a right L5 and S1 distribution down the outside and back of the leg.  Today she says is actually really good day and she has no pain but at times in the past it is been quite severe.  She saw my partner, who ordered a right L5-S1 transforaminal injection for her in February and she notes that this provided a couple weeks of relief.  She is been taking a low-dose gabapentin and this also seems to help somewhat.  She says today given the low level of pain she would like to try to maximize nonoperative options.  She is also taking Celebrex.         Past Medical History:     Past Medical History:   Diagnosis Date     Acne vulgaris      Rheumatoid arthritis(714.0)      Thyroid disease             Past Surgical History:   History reviewed. No pertinent surgical history.         Social History:     Social History     Tobacco Use     Smoking status: Former Smoker     Smokeless tobacco: Never Used   Substance Use Topics     Alcohol use: Yes     Comment: rare            Family History:     Family History   Problem Relation Age of Onset     Diabetes Mother 60     Cerebrovascular Disease Mother      Melanoma Maternal Grandfather      Melanoma Paternal Grandmother      LUNG DISEASE Paternal Grandmother      Cerebrovascular Disease Maternal Grandmother      Alzheimer Disease Maternal Grandmother      Other Cancer Paternal Grandfather             Allergies:   No  "Known Allergies         Medications:     Current Outpatient Medications   Medication     albuterol (PROAIR RESPICLICK) 108 (90 Base) MCG/ACT inhaler     celecoxib (CELEBREX) 100 MG capsule     gabapentin (NEURONTIN) 100 MG capsule     gabapentin (NEURONTIN) 300 MG capsule     Levothyroxine Sodium 125 MCG CAPS     metroNIDAZOLE (METROGEL) 0.75 % vaginal gel     Omega-3 Fatty Acids (OMEGA-3 FISH OIL PO)     tretinoin (RETIN-A) 0.025 % cream     No current facility-administered medications for this visit.              Review of Systems:     A 10 point ROS was performed and reviewed. Specific responses to these questions are noted at the end of the document.         Physical Exam:   Vitals: Ht 1.753 m (5' 9\")   Wt 79.4 kg (175 lb)   BMI 25.84 kg/m    Constitutional: awake, alert, cooperative, no apparent distress, appears stated age.    Eyes: The sclera are white.  Ears, Nose, Throat: The trachea is midline.  Psychiatric: The patient has a normal affect.  Respiratory: breathing non-labored  Cardiovascular: The extremities are warm and perfused.  Skin: no obvious rashes or lesions.  Musculoskeletal, Neurologic, and Spine:          Lumbar Spine:    Appearance - No gross stepoffs or deformities    Motor -     L2-3: Hip flexion 4/5 R and 5/5 L strength          L3/4:  Knee extension R 5/5 and L 5/5 strength         L4/5:  Foot dorsiflexion R 4/5 L 5/5 and       EHL dorsiflexion R 2/5 L 4/5 strength         S1:  Plantarflexion/Peroneal Muscles  R 4/5 and L 5/5 strength    Sensation: intact to light touch L3-S1 distribution BLE, diminished right L5 and S1      Neurologic:      REFLEXES Left Right                  Patella 1+ 1+   Ankle jerk 1+ 1+   Babinski No upgoing great toe No upgoing great toe   Clonus 0 beats 0 beats     Hip Exam:  No pain with hip log roll and no tenderness over the greater trochanters.    Alignment:  Patient stands with a neutral standing sagittal balance.         Imaging:   We ordered and " independently reviewed new radiographs at this clinic visit. The results were discussed with the patient.  Findings include:      2/19/2020 AP lateral flexion-extension lumbar radiographs show diffuse lumbar spondylosis with no instability.    Lumbar MRI February 8, 2020 shows diffuse lumbar spondylosis, trace anterolisthesis L5 on S1, lateral recess stenosis L4-5 bilaterally and on the right at L5-S1             Assessment and Plan:   Assessment:    Right greater than left radicular complaints in L5 and S1 distribution due to lumbar spondylosis     Plan:  1. At this time she is very hesitant to consider any procedures, both given her mild symptoms as well as the recent virus crisis which makes her hesitant to pursue any interventions.  I think this is reasonable given her mild symptoms at this time.  I recommended she continue her oral pain regimen.  I recommended she attempt an L5-S1 interlaminar epidural steroid injection to try and give her some better relief.  I am also going to order some lumbar physical therapy.  Given her interest in nonoperative management I am going to have her see my physician assistant Celsa Elkins for follow-up.  I told her if she does change her mind or becomes interested in surgery in the future we could always have her re-discussed with me and we had a very pleasant conversation today discussing the options.  She will let me know if any other questions arise.      Respectfully,  Juan Diego See MD  Spine Surgery  [facility]

## 2020-06-19 NOTE — LETTER
July 15, 2020      Betty Tee  [address]  [facility] [address]        Dear ,    We are writing to inform you of your test results.    Inflammation marker (ESR,CRP) on repeat testing showed improvement.    Resulted Orders   Rheumatoid factor   Result Value Ref Range    Rheumatoid Factor <7 <12 IU/mL   Cyclic Citrullinated Peptide Antibody IgG   Result Value Ref Range    Cyclic Citrullinated Peptide Antibody, IgG 2 <7 U/mL      Comment:      Negative   Protein immunofixation urine   Result Value Ref Range    Immunofix ELP Urine       No monoclonal protein seen on immunofixation.  Pathological significance requires clinical   correlation.        Comment:      NATE Leong M.D., Ph.D.   Protein Immunofixation Serum   Result Value Ref Range    Immunofixation ELP (Note)       Comment:      Possible faint monoclonal IgA immunoglobulin of lambda light chain  type. Pathological significance requires clinical correlation. NATE Leong M.D., Ph.D.      IGG 1,196 610 - 1,616 mg/dL     (H) 84 - 499 mg/dL     35 - 242 mg/dL   Vitamin D Deficiency   Result Value Ref Range    Vitamin D Deficiency screening 45 20 - 75 ug/L      Comment:      Season, race, dietary intake, and treatment affect the concentration of   25-hydroxy-Vitamin D. Values may decrease during winter months and increase   during summer months. Values 20-29 ug/L may indicate Vitamin D insufficiency   and values <20 ug/L may indicate Vitamin D deficiency.  Vitamin D determination is routinely performed by an immunoassay specific for   25 hydroxyvitamin D3.  If an individual is on vitamin D2 (ergocalciferol)   supplementation, please specify 25 OH vitamin D2 and D3 level determination by   LCMSMS test [id number].     Cryoglobulin quantitative   Result Value Ref Range    Cryoglobulin Trace (A) NEG^Negative %      Comment:      This test was developed and its performance characteristics determined by the   [facility]  [facility].   It has not been cleared or approved by the FDA. The laboratory is regulated   under CLIA as qualified to perform high-complexity testing. This test is used   for clinical purposes. It should not be regarded as investigational or for   research.     Protein electrophoresis   Result Value Ref Range    Albumin Fraction 3.7 3.7 - 5.1 g/dL    Alpha 1 Fraction 0.4 0.2 - 0.4 g/dL    Alpha 2 Fraction 1.1 (H) 0.5 - 0.9 g/dL    Beta Fraction 1.2 (H) 0.6 - 1.0 g/dL    Gamma Fraction 1.2 0.7 - 1.6 g/dL    Monoclonal Peak 0.2 (H) 0.0 g/dL    ELP Interpretation:       Small monoclonal protein (0.2 g/dL) seen in the gamma fraction. See immunofixation report   on same specimen. Increased alpha 1 and alpha 2 fraction. Pathologic significance requires   clinical correlation.  NATE Leong M.D., Ph.D., Pathologist.     Protein  random urine with Creat Ratio   Result Value Ref Range    Protein Random Urine 0.07 g/L    Protein Total Urine g/gr Creatinine 0.78 (H) 0 - 0.2 g/g Cr   UA with Microscopic reflex to Culture   Result Value Ref Range    Color Urine Straw     Appearance Urine Clear     Glucose Urine Negative NEG^Negative mg/dL    Bilirubin Urine Negative NEG^Negative    Ketones Urine Negative NEG^Negative mg/dL    Specific Gravity Urine 1.005 1.003 - 1.035    Blood Urine Negative NEG^Negative    pH Urine 6.0 5.0 - 7.0 pH    Protein Albumin Urine Negative NEG^Negative mg/dL    Urobilinogen mg/dL 0.0 0.0 - 2.0 mg/dL    Nitrite Urine Negative NEG^Negative    Leukocyte Esterase Urine Negative NEG^Negative    Source Midstream Urine     WBC Urine <1 0 - 5 /HPF    RBC Urine 1 0 - 2 /HPF    Bacteria Urine Few (A) NEG^Negative /HPF   Erythrocyte sedimentation rate auto   Result Value Ref Range    Sed Rate 59 (H) 0 - 30 mm/h   CRP inflammation   Result Value Ref Range    CRP Inflammation 88.3 (H) 0.0 - 8.0 mg/L   Complement C3   Result Value Ref Range    Complement C3 142  81 - 157 mg/dL   Complement C4   Result Value Ref Range    Complement C4 33 13 - 39 mg/dL   CBC with platelets differential   Result Value Ref Range    WBC 9.7 4.0 - 11.0 10e9/L    RBC Count 3.95 3.8 - 5.2 10e12/L    Hemoglobin 11.9 11.7 - 15.7 g/dL    Hematocrit 37.8 35.0 - 47.0 %    MCV 96 78 - 100 fl    MCH 30.1 26.5 - 33.0 pg    MCHC 31.5 31.5 - 36.5 g/dL    RDW 14.6 10.0 - 15.0 %    Platelet Count 209 150 - 450 10e9/L    Diff Method Automated Method     % Neutrophils 76.5 %    % Lymphocytes 9.7 %    % Monocytes 11.6 %    % Eosinophils 1.1 %    % Basophils 0.3 %    % Immature Granulocytes 0.8 %    Nucleated RBCs 0 0 /100    Absolute Neutrophil 7.4 1.6 - 8.3 10e9/L    Absolute Lymphocytes 0.9 0.8 - 5.3 10e9/L    Absolute Monocytes 1.1 0.0 - 1.3 10e9/L    Absolute Eosinophils 0.1 0.0 - 0.7 10e9/L    Absolute Basophils 0.0 0.0 - 0.2 10e9/L    Abs Immature Granulocytes 0.1 0 - 0.4 10e9/L    Absolute Nucleated RBC 0.0    AST   Result Value Ref Range    AST 10 0 - 45 U/L   Albumin level   Result Value Ref Range    Albumin 3.2 (L) 3.4 - 5.0 g/dL   ALT   Result Value Ref Range    ALT 12 0 - 50 U/L       If you have any questions or concerns, please call the clinic at the number listed above.       Sincerely,        Zulma Lopez MD

## 2020-06-19 NOTE — LETTER
6/19/2020     RE: Betty Tee  [address]  [facility] [address]      Dear Colleague,    Thank you for referring your patient, Betty Tee, to the [facility]. Please see a copy of my visit note below.    Spine Surgery Consultation    REFERRING PHYSICIAN: Referred Self   PRIMARY CARE PHYSICIAN: Max Martins           Chief Complaint:   RECHECK (follow up for in clinic evaluation )      History of Present Illness:  Symptom Profile Including: location of symptoms, onset, severity, exacerbating/alleviating factors, previous treatments:        The patient presents today for evaluation of right greater than left radicular complaints which have been ongoing for a number of months.  The symptoms are a burning type sensation with occasional feeling of weakness predominantly in a right L5 and S1 distribution down the outside and back of the leg.  Today she says is actually really good day and she has no pain but at times in the past it is been quite severe.  She saw my partner, who ordered a right L5-S1 transforaminal injection for her in February and she notes that this provided a couple weeks of relief.  She is been taking a low-dose gabapentin and this also seems to help somewhat.  She says today given the low level of pain she would like to try to maximize nonoperative options.  She is also taking Celebrex.         Past Medical History:     Past Medical History:   Diagnosis Date     Acne vulgaris      Rheumatoid arthritis(714.0)      Thyroid disease             Past Surgical History:   History reviewed. No pertinent surgical history.         Social History:     Social History     Tobacco Use     Smoking status: Former Smoker     Smokeless tobacco: Never Used   Substance Use Topics     Alcohol use: Yes     Comment: rare            Family History:     Family History   Problem Relation Age of Onset     Diabetes Mother 60     Cerebrovascular Disease Mother      Melanoma Maternal  "Grandfather      Melanoma Paternal Grandmother      LUNG DISEASE Paternal Grandmother      Cerebrovascular Disease Maternal Grandmother      Alzheimer Disease Maternal Grandmother      Other Cancer Paternal Grandfather             Allergies:   No Known Allergies         Medications:     Current Outpatient Medications   Medication     albuterol (PROAIR RESPICLICK) 108 (90 Base) MCG/ACT inhaler     celecoxib (CELEBREX) 100 MG capsule     gabapentin (NEURONTIN) 100 MG capsule     gabapentin (NEURONTIN) 300 MG capsule     Levothyroxine Sodium 125 MCG CAPS     metroNIDAZOLE (METROGEL) 0.75 % vaginal gel     Omega-3 Fatty Acids (OMEGA-3 FISH OIL PO)     tretinoin (RETIN-A) 0.025 % cream     No current facility-administered medications for this visit.              Review of Systems:     A 10 point ROS was performed and reviewed. Specific responses to these questions are noted at the end of the document.         Physical Exam:   Vitals: Ht 1.753 m (5' 9\")   Wt 79.4 kg (175 lb)   BMI 25.84 kg/m    Constitutional: awake, alert, cooperative, no apparent distress, appears stated age.    Eyes: The sclera are white.  Ears, Nose, Throat: The trachea is midline.  Psychiatric: The patient has a normal affect.  Respiratory: breathing non-labored  Cardiovascular: The extremities are warm and perfused.  Skin: no obvious rashes or lesions.  Musculoskeletal, Neurologic, and Spine:          Lumbar Spine:    Appearance - No gross stepoffs or deformities    Motor -     L2-3: Hip flexion 4/5 R and 5/5 L strength          L3/4:  Knee extension R 5/5 and L 5/5 strength         L4/5:  Foot dorsiflexion R 4/5 L 5/5 and       EHL dorsiflexion R 2/5 L 4/5 strength         S1:  Plantarflexion/Peroneal Muscles  R 4/5 and L 5/5 strength    Sensation: intact to light touch L3-S1 distribution BLE, diminished right L5 and S1      Neurologic:      REFLEXES Left Right                  Patella 1+ 1+   Ankle jerk 1+ 1+   Babinski No upgoing great toe No " upgoing great toe   Clonus 0 beats 0 beats     Hip Exam:  No pain with hip log roll and no tenderness over the greater trochanters.    Alignment:  Patient stands with a neutral standing sagittal balance.         Imaging:   We ordered and independently reviewed new radiographs at this clinic visit. The results were discussed with the patient.  Findings include:      2/19/2020 AP lateral flexion-extension lumbar radiographs show diffuse lumbar spondylosis with no instability.    Lumbar MRI February 8, 2020 shows diffuse lumbar spondylosis, trace anterolisthesis L5 on S1, lateral recess stenosis L4-5 bilaterally and on the right at L5-S1             Assessment and Plan:   Assessment:    Right greater than left radicular complaints in L5 and S1 distribution due to lumbar spondylosis     Plan:  1. At this time she is very hesitant to consider any procedures, both given her mild symptoms as well as the recent virus crisis which makes her hesitant to pursue any interventions.  I think this is reasonable given her mild symptoms at this time.  I recommended she continue her oral pain regimen.  I recommended she attempt an L5-S1 interlaminar epidural steroid injection to try and give her some better relief.  I am also going to order some lumbar physical therapy.  Given her interest in nonoperative management I am going to have her see my physician assistant Celsa Elkins for follow-up.  I told her if she does change her mind or becomes interested in surgery in the future we could always have her re-discussed with me and we had a very pleasant conversation today discussing the options.  She will let me know if any other questions arise.      Respectfully,  Juan Diego See MD  Spine Surgery  [facility]

## 2020-06-19 NOTE — NURSING NOTE
"Reason For Visit:   Chief Complaint   Patient presents with     Consult     lumbar radicular pain        Primary MD: Ilene Tristan  Ref. MD: Nikko     ?  No  Date of surgery: none   Type of surgery: none .  Smoker: No  Request smoking cessation information: No    Ht 1.676 m (5' 6\")   Wt 90.3 kg (199 lb)   BMI 32.12 kg/m           Oswestry (RADHA) Questionnaire    OSWESTRY DISABILITY INDEX 8/21/2018   Count 9   Sum 24   Oswestry Score (%) 53.33   Some recent data might be hidden            Neck Disability Index (NDI) Questionnaire    No flowsheet data found.                Promis 10 Assessment    PROMIS 10 5/10/2016   In general, would you say your health is: Good = 3   In general, would you say your quality of life is: Good = 3   In general, how would you rate your physical health? Good = 3   In general, how would you rate your mental health, including your mood and your ability to think? Good = 3   In general, how would you rate your satisfaction with your social activities and relationships? Very good = 4   In general, please rate how well you carry out your usual social activities and roles Very good = 4   To what extent are you able to carry out your everyday physical activities such as walking, climbing stairs, carrying groceries, or moving a chair? Moderately = 3   How often have you been bothered by emotional problems such as feeling anxious, depressed or irritable? Sometimes = 3   How would you rate your fatigue on average? Moderate = 3   How would you rate your pain on average?   0 = No Pain  to  10 = Worst Imaginable Pain 2   Global Physical Health Score : Raw Score 13 (SUM : G03 - G06 - G07 - G08)   Global Mental Health Score : Raw Score 13 (SUM : G02 - G04 - G05 - G10)   Total (Physical + Mental Health Score) 26   Some recent data might be hidden                Wesley Bowers ATC  "

## 2020-06-21 LAB — DEPRECATED CALCIDIOL+CALCIFEROL SERPL-MC: 45 UG/L (ref 20–75)

## 2020-06-22 ENCOUNTER — TELEPHONE (OUTPATIENT)
Dept: RHEUMATOLOGY | Facility: CLINIC | Age: 80
End: 2020-06-22

## 2020-06-22 LAB
ALBUMIN SERPL ELPH-MCNC: 3.7 G/DL (ref 3.7–5.1)
ALPHA1 GLOB SERPL ELPH-MCNC: 0.4 G/DL (ref 0.2–0.4)
ALPHA2 GLOB SERPL ELPH-MCNC: 1.1 G/DL (ref 0.5–0.9)
B-GLOBULIN SERPL ELPH-MCNC: 1.2 G/DL (ref 0.6–1)
C3 SERPL-MCNC: 142 MG/DL (ref 81–157)
C4 SERPL-MCNC: 33 MG/DL (ref 13–39)
CCP AB SER IA-ACNC: 2 U/ML
GAMMA GLOB SERPL ELPH-MCNC: 1.2 G/DL (ref 0.7–1.6)
IGA SERPL-MCNC: 639 MG/DL (ref 84–499)
IGG SERPL-MCNC: 1196 MG/DL (ref 610–1616)
IGM SERPL-MCNC: 122 MG/DL (ref 35–242)
M PROTEIN SERPL ELPH-MCNC: 0.2 G/DL
PROT ELPH PNL UR ELPH: NORMAL
PROT PATTERN SERPL ELPH-IMP: ABNORMAL
PROT PATTERN SERPL IFE-IMP: ABNORMAL
RHEUMATOID FACT SER NEPH-ACNC: <7 IU/ML (ref 0–20)

## 2020-06-22 NOTE — TELEPHONE ENCOUNTER
[facility] Call Center    Phone Message    May a detailed message be left on voicemail: yes     Reason for Call: Other: Nghia the patient's care case worer called to schedule the patient for the middle of July, there was no open slots until Setember. Please call to inform if the patient can be seen sooner.     Action Taken: Other: [facility] Rheumatology    Travel Screening: Not Applicable

## 2020-06-23 ENCOUNTER — TELEPHONE (OUTPATIENT)
Dept: FAMILY MEDICINE | Facility: CLINIC | Age: 80
End: 2020-06-23

## 2020-06-23 ENCOUNTER — ALLIED HEALTH/NURSE VISIT (OUTPATIENT)
Dept: PHARMACY | Facility: CLINIC | Age: 80
End: 2020-06-23
Payer: COMMERCIAL

## 2020-06-23 DIAGNOSIS — E11.65 TYPE 2 DIABETES MELLITUS WITH HYPERGLYCEMIA, WITH LONG-TERM CURRENT USE OF INSULIN (H): Primary | ICD-10-CM

## 2020-06-23 DIAGNOSIS — I50.32 CHRONIC HEART FAILURE WITH PRESERVED EJECTION FRACTION (H): ICD-10-CM

## 2020-06-23 DIAGNOSIS — J30.1 NON-SEASONAL ALLERGIC RHINITIS DUE TO POLLEN: ICD-10-CM

## 2020-06-23 DIAGNOSIS — F33.1 MAJOR DEPRESSIVE DISORDER, RECURRENT EPISODE, MODERATE (H): ICD-10-CM

## 2020-06-23 DIAGNOSIS — G47.01 INSOMNIA DUE TO MEDICAL CONDITION: ICD-10-CM

## 2020-06-23 DIAGNOSIS — M19.90 OSTEOARTHRITIS, UNSPECIFIED OSTEOARTHRITIS TYPE, UNSPECIFIED SITE: ICD-10-CM

## 2020-06-23 DIAGNOSIS — M85.852 OSTEOPENIA OF NECK OF LEFT FEMUR: ICD-10-CM

## 2020-06-23 DIAGNOSIS — M19.90 INFLAMMATORY ARTHRITIS: ICD-10-CM

## 2020-06-23 DIAGNOSIS — I48.21 PERMANENT ATRIAL FIBRILLATION (H): ICD-10-CM

## 2020-06-23 DIAGNOSIS — Z79.4 TYPE 2 DIABETES MELLITUS WITH HYPERGLYCEMIA, WITH LONG-TERM CURRENT USE OF INSULIN (H): Primary | ICD-10-CM

## 2020-06-23 DIAGNOSIS — M19.90 INFLAMMATORY ARTHRITIS: Primary | ICD-10-CM

## 2020-06-23 DIAGNOSIS — E78.2 MIXED HYPERLIPIDEMIA: ICD-10-CM

## 2020-06-23 DIAGNOSIS — M35.02 SJOGREN'S SYNDROME WITH LUNG INVOLVEMENT (H): ICD-10-CM

## 2020-06-23 DIAGNOSIS — J44.89 FOLLICULAR BRONCHIOLITIS (H): ICD-10-CM

## 2020-06-23 PROCEDURE — 99607 MTMS BY PHARM ADDL 15 MIN: CPT | Performed by: PHARMACIST

## 2020-06-23 PROCEDURE — 99606 MTMS BY PHARM EST 15 MIN: CPT | Performed by: PHARMACIST

## 2020-06-23 RX ORDER — LORATADINE 10 MG/1
10 TABLET ORAL DAILY
COMMUNITY
End: 2020-06-30

## 2020-06-23 NOTE — PATIENT INSTRUCTIONS
Recommendations from today's MTM visit:                                                      1. I spoke with Dr. Tristan and she agrees, if you are having good luck with the prednisone, you don't have to do the injection right now. Instead, she recommends that you see a pain management specialist, Dr. Moore, at the pain management clinic. You can make an appointment by calling: [phone]    2. If you do go through with the injection, we may need to stop your Xarelto. Let us know if you are going to keep this appointment at least a few days ahead of time.     It was great to speak with you today.  I value your experience and would be very thankful for your time with providing feedback on our clinic survey. You may receive a survey via email or text message in the next few days.     Next MTM visit: 3 months, sooner if needed.     To schedule another MTM appointment, please call the clinic directly or you may call the MTM scheduling line at [phone] or toll-free at [phone].     My Clinical Pharmacist's contact information:                                                      It was a pleasure talking with you today!  Please feel free to contact me with any questions or concerns you have.      Sabrina Meek, Pharm.D., M.B.A., [facility]  MTM Pharmacist, [facility]  Pager: [phone]  Email: [email or web address]

## 2020-06-23 NOTE — PROGRESS NOTES
MTM ENCOUNTER  SUBJECTIVE/OBJECTIVE:                           Betty Tee is a 80 year old female called for a follow-up visit. She was referred to me from Dr. Tristan.  Today's visit is a follow-up MTM visit from 3/17/2020   Patient consented to a telehealth visit: yes  Telemedicine Visit Details  Type of service:  Telephone visit  Start Time: 1:01 PM  End Time: 1:28 PM  Originating Location (pt. Location): Home  Distant Location (provider location):  [facility]  Mode of Communication:  Telephone    Chief Complaint: Med review/update.    Allergies/ADRs: Reviewed in EHR  Tobacco:  reports that she quit smoking about 8 years ago. Her smoking use included cigarettes. She has a 5.00 pack-year smoking history. She has never used smokeless tobacco.  Alcohol: not currently using    Medication Adherence/Access: no issues reported    Hip Pain: She was recently seen by sports medicine, and then by spinal surgery. Was started on a prednisone burst (higher than her normal 5mg dose - see med list) on 6/20. Pain improved since that time. Has not been using any narcotics (has an Rx for hydrocodone/APAP 5/325 on file, but doesn't like the way this makes her head feel). Has been getting by on just acetaminophen. She is using 2-4 500mg tablets of tylenol per day. She has occasionally taken 3 tablets at a time when the pain has been really bad. Additional meds for pain management include: Gabapentin 300mg three times daily. Occasionally uses lidoderm patches, but hasn't used lidoderm since starting prednisone.   She reports pain is a 3-4 with movement, and 0 if she is sitting down. Has not had a flare-up since starting prednisone.   She is not interested in surgical intervention, but has a lumbar injection scheduled in June. She's not sure she's comfortable proceeding with this. She is wondering what MTM and Dr. Tristan think about a spinal injection.     Diabetes:  Pt currently taking Ozempic 0.5mg weekly,  Novolog 6 units three times a day with meals, Basaglar 22 units daily. Pt is not experiencing side effects.   SMBG: three times daily.   Ranges (patient reported): 107 this morning and at noon today. 120-140's on average, she feels like the highest readings she has seen are in the 150's usually after eating.   Patient is not experiencing hypoglycemia  Recent symptoms of high blood sugar? none  Eye exam: up to date  Foot exam: up to date  ACEi/ARB: No. Urine albumin <30mg/g Cr and BP runs low with meds needed to manage rate (A.Fib).   Aspirin: Not taking due to Xarelto use     A.fib: Currently anticoagulated with Xarelto 20mg daily. Denies s/sx of bleeding.     HFpEF: Current medications include metoprolol succinate 62.5mg twice daily, torsemide 30mg in the morning and 40mg at noon, spironolactone 50mg daily (2-25mg tablets) and potassium 20mEq - 2 tablets in the AM and 1 tablet in the PM .  Patient reports no current medication side effects.     Pt is not complaining of sx of HF.    Pt is measuring daily weights and reports that it is stable    Bronchiolitis: Currently taking Azithromycin as an anti-imflammatory, Breo 100/25mg once daily and albuterol as needed (although she hasn't used much, if any, of this).      Sjogren's: Taking hydroxychloriquine 200mg - 2 tablets at bedtime, Prednisone (currently higher dose as noted above for pain, but will taper back to her normal 5mg daily dose) and Cevimeline 30mg daily at night (rx'd as TID, but states she doesn't need that much, would be drooling all the time).     Osteopenia: Currently taking vitamin D 2,000 international unit(s) and believes she gets 3 servings of calcium in her daily diet. She has had a Dexa in Aug 2019, with lowest T-Score of -2.1 at left femoral neck. Dr. Lopez had recommended an endocrinology appointment, but Betty never followed-up. She however, was started on Fosamax at 6/11 visit with Dr. Lopez and has taken two doses so far and has had no  SE or issues.      Insomnia: Taking 50-75mg each night, prior to her hip issue and increase in prednisone she was taking as needed, but now feels that she needs it nightly. It's somewhat helpful without significant morning hangover.     Allergies: Currently using Flonase 2 sprays in each nostril daily, loratadine 10mg daily and Singulair (also helping with lung function). Denies SE with these meds.     Hyperlipidemia: Current therapy includes atorvastatin 5mg once daily (1/2 10mg tablet).  Pt reports no significant myalgias or other side effects.     Depression:  Current medications include: Escitalopram 20mg once daily. Pt reports that depression symptoms are well controlled. .    Serum creatinine: 0.83 mg/dL 06/19/20 1109  Estimated creatinine clearance: 61.2 mL/min    ASSESSMENT:                            Medication Adherence: no issues identified    Hip Pain: Pain is improving with increased prednisone dose. Per her request discussed spinal injection with Dr. Tristan. Dr. Tristan advises pt to see Dr. Moore in the pain clinic for additional options.      Diabetes:  Per pt reported glucose values, she is at goal. Last A1c in June was 6.8%.      A.fib: Stable - Xarelto may need to be held for 24 hrs prior to spinal injection if pt chooses to go through with this.     HFpEF: Stable    Bronchiolitis: Stable     Sjogren's: Stable    Osteopenia: Stable    Insomnia: Stable    Allergies: Stable    Hyperlipidemia: Stable     Depression:  Stable    PLAN:                            1. Pain clinic referral placed by Dr. Tristan, information reviewed with patient. She is undecided about following-up with another physician, but will think about this if pain returns after she finishes current prednisone taper. She states she will more than likely cancel spinal injection (reviewed if she doesn't to let us know so we can offer advice about Xarelto management).     I spent 30 minutes with this patient today. All changes were made  via collaborative practice agreement with Dr. Tristan. A copy of the visit note was provided to the patient's primary care provider.    Will follow up in 3 months, sooner if needed.    The patient was sent via [email or web address] a summary of these recommendations.     Sabrina Meek, Pharm.D., M.B.A., BCACP  [facility] Pharmacist, [facility]  Pager: [phone]  Email: [email or web address]

## 2020-06-23 NOTE — TELEPHONE ENCOUNTER
Talked with Sabrina Meek- see her note from today.  Referred to pain management clinic- rec Dr. Moore.  Sabrina will call to let her know.  Thank you!  CW

## 2020-06-24 ENCOUNTER — TELEPHONE (OUTPATIENT)
Dept: PALLIATIVE MEDICINE | Facility: CLINIC | Age: 80
End: 2020-06-24

## 2020-06-24 LAB — CRYOGLOB SER QL: ABNORMAL %

## 2020-06-24 NOTE — TELEPHONE ENCOUNTER
LVM to schedule New Eval; Inflammatory arthritis, Osteoarthritis.    Mya ESQUIVEL    [facility]

## 2020-06-28 DIAGNOSIS — J30.1 SEASONAL ALLERGIC RHINITIS DUE TO POLLEN: Primary | ICD-10-CM

## 2020-06-29 NOTE — TELEPHONE ENCOUNTER
Routing refill request to provider for review/approval because:  Medication is reported/historical  Patient above recommended age for this med  Lydia HALEY RN

## 2020-06-30 ENCOUNTER — TELEPHONE (OUTPATIENT)
Dept: FAMILY MEDICINE | Facility: CLINIC | Age: 80
End: 2020-06-30

## 2020-06-30 RX ORDER — LORATADINE 10 MG/1
TABLET ORAL
Qty: 90 TABLET | Refills: 1 | Status: SHIPPED | OUTPATIENT
Start: 2020-06-30 | End: 2021-03-12

## 2020-06-30 NOTE — TELEPHONE ENCOUNTER
Rx sent- pt has used in past, taken off list in 3/20 by MTM. Noted that she was taking flonase and singulair.  Likely needs seasonally- will send.  CW

## 2020-06-30 NOTE — RESULT ENCOUNTER NOTE
Please call pt with results- sorry for the delay.   Most stable except for the increased urine protein, which we can discuss at her next appt.  Please help her make an appt in July- in person ideal, otherwise video, 45 minutes.  Thanks- CW

## 2020-07-01 ENCOUNTER — VIRTUAL VISIT (OUTPATIENT)
Dept: RHEUMATOLOGY | Facility: CLINIC | Age: 80
End: 2020-07-01
Attending: INTERNAL MEDICINE
Payer: MEDICARE

## 2020-07-01 DIAGNOSIS — M35.3 POLYMYALGIA RHEUMATICA (H): Primary | ICD-10-CM

## 2020-07-01 ASSESSMENT — PAIN SCALES - GENERAL: PAINLEVEL: SEVERE PAIN (6)

## 2020-07-01 NOTE — TELEPHONE ENCOUNTER
Left message for patient to callback.  JASON Burgess, Ilene Mahan MD  [facility] [place] T[facility]               Please call pt with results- sorry for the delay.   Most stable except for the increased urine protein, which we can discuss at her next appt.   Please help her make an appt in July- in person ideal, otherwise video, 45 minutes.   Thanks- CW

## 2020-07-01 NOTE — PATIENT INSTRUCTIONS
The diagnosis is PMR or polymyalgia rheumatica    Prednisone 15-12.5-10-9-8-7-6 mg a day each for 2 weeks then 5 mg a day    Please monitor your blood sugar closely on prednisone    ESR, CRP next week (ordered)    Stay on fosamax weekly    Yearly eye exam    Return in 2 months

## 2020-07-01 NOTE — LETTER
2020       RE: Betty Tee  [address]  [facility] [address]     Dear Colleague,    Thank you for referring your patient, Betty Tee, to the [facility] RHEUMATOLOGY at [facility]. Please see a copy of my visit note below.    [facility]  Rheumatology Clinic Virtual Visit Note  Zulma Lopez MD  DOS:  2020   Date of last visit: 2020    Name: Betty Tee  MRN: [id number]  Age: 80 year old  : 1940  Reason for visit: Follow up visit for B/L hip pain, primary Sjogren's syndrome    #B/L hip trochanteric bursitis  # Sjogren's syndrome since  with borderline +RG, +SSA, and lip biopsy focus score of 1.  # prednisone-responsive polyarthritis  # Follicular bronchiolitis, prior mild ILD  # Osteopenia on DEXA 2019 with T score=-2.1 with decline in bone density  # Chronic prednisone use  # DM  # A fib     Assessment and Plan:    New Dx of PMR. Hip pain, inflammation markers improved on prednisone    Primary Sjogren's syndrome with ILD   Sister Sita returns with stable PFTs and improvement on most recent chest CT showing bronchiolitis, but no ILD. Completed pulmonary rehab in 2019 where she was able to exercise without oxygen.     On HCQ since 2019 with significant  Improvement of joint pain.    Tolerates HCQ well. Her dry mouth is tolerable. Breathing is stable. No other complaints today.      Plan:  Prednisone 15-12.5-10-9-8-7-6 mg a day each for 2 weeks then 5 mg a day    Please monitor your blood sugar closely on prednisone    ESR, CRP next week (ordered)    Stay on fosamax weekly    Yearly eye exam    Return in 2 months        Orders Placed This Encounter   Procedures     Erythrocyte sedimentation rate auto     CRP inflammation       Video Start Time: 4:30 pm  Video End Time: 5 pm    Zulma Lopez MD      Subjective:  Betty Tee is a 79 year old female with a history of primary Sjogren's syndrome who presents for  follow-up in the setting of recent pain flare in her bilateral hips x several weeks. The pain has been more intense and longer lasting than previous joint pain. Lidocaine patches have helped, and she increased her prednisone (20 mg x 5 days, then 7.5 mg through today) per rheumatology but it did not help the pain. She reports this recent pain flare has made her feel discouraged and down.     HPI:   Betty Tee is a 79 year old female with a history of primary Sjogren's syndrome who presents for follow-up. She was diagnosed with Sjogren's syndrome in 2015 with borderline +RG, +SSA, and lip biopsy focus score of 1. Associated ILD and joint pain are prednisone-responsive which support the diagnosis. Ocular staining score has been deferred given other sources of diagnosis.    Interval history: 6/7/18   Today, the patient states she is feeling short of breath as she did not bring her oxygen tank with her. She states she has been feeling a bit more short of breath at home while going up and down her basement steps recently. She last saw her pulmonologist, Dr. Walsh, on 1/10/2018 and does not have recent pulmonary function tests. She has not needed to increase her oxygen levels at home, noting she has always used 4 liters, which seems to be sufficient for her. When she is out of the house she frequently leaves her oxygen tank at home. She does currently have an irritating, non-productive cough that began approximately one week ago with associated postnasal drip. She has been experiencing night sweats and infrequent chills this week but denies any measured fevers.     She denies any joint pains today, but does state she had a flare of joint pain, specifically in her right knee, a few weeks ago. The pain hit very intensely prior to resolving.      With regards to starting rituximab, she voices she discussed this with her primary care provider who does not feel this is a good idea.     Interval history: 3/8/18  Patient  reported doing rather well but did mention some trouble with intermittent arthralgias. We discussed consideration of rituximab given problems stemming from long-term prednisone use and she elected to think about this option. Plan was made for continuation of 10mg of prednisone daily for the time being. Recommended starting Fosamax, 70mg, once per week, however she was hesitant about side effects.     8/16/2019: Former pt of Dr. Stringer is here today to transfer care. Last night, woke up with pain over R ribs. Similar pain happened in 7/2019, self-resolved. Her x-rays at that time were suggestive of questionable rib Fx. On  mg bid+prednisone 6/8 mg every other day (5/13/2019). Dry mouth is bothersome, using OTC biotene but CPAP makes it worse. Report losing bladder control on occasions.     (1/15/2020): Patient returns today with several weeks of intense bilateral hip pain. It's more intense and has been longer-lasting than previous joint pain. She was on a prednisone taper and was at 5 mg daily when this pain started. Per rheumatology, she went up to 20 mg prednisone daily x 5 days, now 7.5 mg daily x 7 days but the prednisone increase did not help the pain. Lidocaine patches have helped her hip pain. Her dry mouth has been tolerable, she's taking evoxac once per day. No changes in breathing.       Today 7/1/2020: Feeling much better after taking the prednisone 20 mg every day taper, hip pain was gone,coming back after prednisone taper. On fosamax since last visit.    Review of Systems:   A comprehensive ROS was done. Positives are per HPI.      Active Medications:     Outpatient Medications Prior to Visit   Medication Sig Dispense Refill     acetaminophen (TYLENOL) 500 MG tablet Take 1,000 mg by mouth every 8 hours as needed (max 6 tablets/24 hours, 2 tablets/dose)        albuterol (PROAIR HFA, PROVENTIL HFA, VENTOLIN HFA) 108 (90 BASE) MCG/ACT inhaler Inhale 2 puffs into the lungs every 6 hours 1 Inhaler 3      alendronate (FOSAMAX) 70 MG tablet Take 1 tablet (70 mg) by mouth every 7 days 12 tablet 1     atorvastatin (LIPITOR) 10 MG tablet TAKE 1/2 TABLET BY MOUTH DAILY 45 tablet 0     azithromycin (ZITHROMAX) 250 MG tablet Take 1 tablet (250 mg) by mouth daily 30 tablet 11     CEVIMELINE 30 MG PO capsule TAKE 1 CAPSULE (30 MG) BY MOUTH 3 TIMES DAILY 270 capsule 2     escitalopram (LEXAPRO) 20 MG tablet TAKE 1 TABLET BY MOUTH EVERY DAY 90 tablet 0     fluticasone (FLONASE) 50 MCG/ACT nasal spray Spray 1-2 sprays into both nostrils daily as needed for allergies 18.2 mL 11     fluticasone-vilanterol (BREO ELLIPTA) 100-25 MCG/INH inhaler Inhale 1 puff into the lungs daily 1 Inhaler 11     gabapentin (NEURONTIN) 300 MG capsule Take 1 capsule (300 mg) by mouth 3 times daily 90 capsule 0     hydroxychloroquine (PLAQUENIL) 200 MG tablet TAKE 2 TABLETS (400 MG) BY MOUTH DAILY ANNUAL PLAQUENIL TOXICITY EYE SCREENING REQUIRED. 180 tablet 1     insulin glargine (BASAGLAR KWIKPEN) 100 UNIT/ML pen INJECT 22 UNITS SUBCUTANEOUS DAILY (TO REPLACE LANTUS) 15 mL 0     lidocaine (LIDODERM) 5 % patch Apply patch to painful area for up to 12 h within a 24 h period.  Remove after 12 hours. 30 patch 5     loratadine (CLARITIN) 10 MG tablet TAKE 1 TABLET BY MOUTH EVERY DAY 90 tablet 1     metoprolol succinate ER (TOPROL-XL) 25 MG 24 hr tablet Take 0.5 tablets (12.5 mg) by mouth 2 times daily Take 50 mg by mouth in combination with 12.5 for a total of 62.5 twice a day 90 tablet 3     metoprolol succinate ER (TOPROL-XL) 50 MG 24 hr tablet Take 50 mg by mouth in combination with 12.5 for a total of 62.5 twice a day 180 tablet 3     montelukast (SINGULAIR) 10 MG tablet TAKE 1 TABLET BY MOUTH EVERYDAY AT BEDTIME 30 tablet 5     NOVOLOG FLEXPEN 100 UNIT/ML soln INJECT 12 UNITS SUBCUTANEOUS 3 TIMES DAILY (WITH MEALS) (Patient taking differently: Inject 6 Units Subcutaneous 3 times daily (with meals) ) 15 mL 1     OZEMPIC, 0.25 OR 0.5 MG/DOSE, 2  MG/1.5ML SOPN INJECT 0.5 MG SUBCUTANEOUS ONCE A WEEK 4.5 mL 0     potassium chloride ER (KLOR-CON) 20 MEQ CR tablet Take 2 tablets (40 mEq) by mouth every morning AND 1 tablet (20 mEq) every evening. 270 tablet 3     rivaroxaban ANTICOAGULANT (XARELTO ANTICOAGULANT) 20 MG PO TABS tablet Take 1 tablet (20 mg) by mouth daily (with dinner) 90 tablet 2     spironolactone (ALDACTONE) 25 MG tablet Take 2 tablets (50 mg) by mouth daily 180 tablet 3     torsemide (DEMADEX) 10 MG tablet TAKE ONE TAB (10 MG) WITH ONE 20 MG TAB TO = 30 MG DAILY IN AFTERNOON. 90 tablet 3     torsemide (DEMADEX) 20 MG tablet TAKE 2 TABS (40 MG) IN AM DAILY AND TAKE 1 TAB BY MOUTH IN THE AFTERNOON ALONG W/ A 10 MG  tablet 4     traZODone (DESYREL) 50 MG tablet TAKE 0.5-1.5 TABLETS (25-75 MG) BY MOUTH NIGHTLY AS NEEDED FOR SLEEP 135 tablet 0     vitamin D3 (CHOLECALCIFEROL) 2000 units (50 mcg) tablet Take 1 tablet (2,000 Units) by mouth daily 90 tablet 3     order for DME Equipment being ordered: Oxygen  Pulsed oxygen portable tank  Rate: 4LNC  Diagnosis: ILD  Duration: Lifetime -99 1 Device 1     acyclovir (ZOVIRAX) 5 % external ointment Apply topically 6 times daily As needed for outbreaks (Patient not taking: Reported on 6/23/2020) 15 g 3     HYDROcodone-acetaminophen (NORCO) 5-325 MG tablet Take 1 tablet by mouth every 12 hours as needed for severe pain (Patient not taking: Reported on 6/23/2020) 60 tablet 0     ketoconazole (NIZORAL) 2 % external cream Apply topically 2 times daily as needed for itching (Patient not taking: Reported on 6/23/2020) 60 g 1     predniSONE (DELTASONE) 5 MG tablet 4tab=20 mg qd x 5 days, 3tab=15 mg qd x 5 days, 2tab=10 mg qd x 5 days, then 1 tab=5 mg qd and stay on it 90 tablet 1     No facility-administered medications prior to visit.      Allergies:   Augmentin\  Codeine  Phenobarbital  Seasonal allergies      Past Medical History:  Alcohol abuse, in remission.   Allergic rhinitis.   Antiplatelet long-term  use.   Atrial fibrillation.   Cardiomegaly.   Osteopenia.   Diverticulosis.   Benign essential hypertension.   GERD.   Insomnia.   Irregular heart beat.   Lumbago.   Major depressive disorder, recurrent episode, moderate.   ANGELICA.  Osteoarthrosis.   Sjogren's syndrome.   Sleep apnea.   Tobacco use disorder.   TMJ disorder.   RLS.  Major depressive disorder.   Hypertension.   Sciatica.   Colouterine fistula.   Left ventricular hypertrophy.   Breat fibroadenoma.   DVT.   Fatty liver disease, nonalcoholic.   Rib pain.   Neck mass.   Interstitial lung disease.   Acute and chronic respiratory failure with hypoxia.   Type II diabetes mellitus.   Hyperlipidemia.   Inflammatory arthritis.      Past Surgical History:  Back surgery.   Left breast biopsy.   Appendectomy.   Exploratory laparotomy.   Cardiac surgery.   Cholecystectomy.   Left colectomy.   Total abdominal hysterectomy with bilateral salpingo-oophorectomy.   Insert ureter stent.   Flexible sigmoidoscopy.   Ileostomy takedown.     Family History:   Mother: Positive for CAD, heart disease, hypertension, cerebrovascular disease, hyperlipidemia.   Father: Positive for alcohol/drug abuse, Alzheimer disease, dementia, hypertension, hyperlipidemia.   Sister: Positive for CAD, hypertension, and colorectal cancer.   Sister: Positive for hypertension and hyperlipidemia.   Sister: Positive for diabetes, lung cancer, asthma, and heart disease.   Brother: Positive for Parkinsonism and substance abuse.   Brother: Positive for hypertension, diverticulitis, and prostate cancer.   Daughter: Positive for breast cancer.        Social History:   She is a former smoker; quit in 2011. She has a history of alcohol abuse but stopped drinking in 1986.      Physical Exam:   Constitutional: Well-developed, appearing stated age; cooperative  Eyes: Normal EOM, conjunctiva, sclera  MS: no synovitis  Skin: No alopecia, rash  Neuro: grossly non-focal  Psych: Normal affect.    Images:  CT Chest w/o  contrast (7/25/18):  Findings remain most consistent with small airway disease  and/or nonclassifiable interstitial lung disease and are not  significantly changed from the March 2017 comparison.    Per radiology.    Laboratory:   RHEUM RESULTS Latest Ref Rng & Units 3/4/2020 3/4/2020 6/19/2020   COMPLEMENT C3 81 - 157 mg/dL - - 142   COMPLEMENT C4 13 - 39 mg/dL - - 33   SED RATE 0 - 30 mm/h - - 59(H)   CRP, INFLAMMATION 0.0 - 8.0 mg/L - - 88.3(H)   CK TOTAL 30 - 225 U/L - - -   RHEUMATOID FACTOR <12 IU/mL - - <7   RG SCREEN BY EIA <1.0 - - -   AST 0 - 45 U/L - - 10   ALT 0 - 50 U/L - - 12   ALBUMIN 3.4 - 5.0 g/dL - - 3.2(L)   WBC 4.0 - 11.0 10e9/L 10.6 10.1 9.7   RBC 3.8 - 5.2 10e12/L 4.38 4.06 3.95   HGB 11.7 - 15.7 g/dL 13.2 12.3 11.9   HCT 35.0 - 47.0 % 41.3 38.4 37.8   MCV 78 - 100 fl 94 95 96   MCHC 31.5 - 36.5 g/dL 32.0 32.0 31.5   RDW 10.0 - 15.0 % 13.9 14.1 14.6    - 450 10e9/L 203 206 209   CREATININE 0.52 - 1.04 mg/dL 0.92 - 0.83   GFR ESTIMATE, IF BLACK >60 mL/min/[1.73:m2] 68 - 77   GFR ESTIMATE >60 mL/min/[1.73:m2] 59(L) - 67    - 1,616 mg/dL - - 1,196   IGA 84 - 499 mg/dL - - 639(H)   IGM 35 - 242 mg/dL - - 122       Rheumatoid Factor   Date Value Ref Range Status   07/24/2015 <20 <20 IU/mL Final   ,  ,   Cyclic Cit Pept IgG/IgA   Date Value Ref Range Status   07/24/2015 <20  Interpretation:  Negative   <20 UNITS Final   ,  ,   Scleroderma Antibody Scl-70 CECILIA IgG   Date Value Ref Range Status   07/24/2015  0.0 - 0.9 AI Final    <0.2  Negative   Antibody index (AI) values reflect qualitative changes in antibody   concentration that cannot be directly associated with clinical condition or   disease state.       SSA (Ro) (CECILIA) Antibody, IgG   Date Value Ref Range Status   07/24/2015 1.6 (H) 0.0 - 0.9 AI Final     Comment:     Positive   Antibody index (AI) values reflect qualitative changes in antibody   concentration that cannot be directly associated with clinical condition or   disease  state.       SSB (La) (CECILIA) Antibody, IgG   Date Value Ref Range Status   07/24/2015  0.0 - 0.9 AI Final    <0.2  Negative   Antibody index (AI) values reflect qualitative changes in antibody   concentration that cannot be directly associated with clinical condition or   disease state.       ,  ,   RG Screen by EIA   Date Value Ref Range Status   07/24/2015 <1.0  Interpretation:  Negative   <1.0 Final   ,  ,  ,  ,  ,  ,  ,  ,  ,  ,  ,  ,  ,  ,  ,  ,  ,  ,   Neutrophil Cytoplasmic IgG Antibody   Date Value Ref Range Status   07/24/2015   Final    <1:20  Reference range: <1:20  (Note)  The ANCA IFA is <1:20; therefore, no further testing will  be performed.  INTERPRETIVE INFORMATION: Anti-Neutrophil Cyto Ab, IgG  Neutrophil Cytoplasmic Antibodies (C-ANCA = granular  cytoplasmic staining, P-ANCA = perinuclear staining) are  found in the serum of over 90 percent of patients with  certain necrotizing systemic vasculitides, and usually in  less than 5 percent of patients with collagen vascular  disease or arthritis.  Performed by [facility],  [address] [place] [id number] [phone]  [email or web address], Burke Mckeon MD, Lab. Director       ,  ,  ,  ,  ,  ,  ,   IGG   Date Value Ref Range Status   07/24/2015 1320 695 - 1620 mg/dL Final   ,  ,  ,  ,  ,   Scleroderma Antibody Scl-70 CECILIA IgG   Date Value Ref Range Status   07/24/2015  0.0 - 0.9 AI Final    <0.2  Negative   Antibody index (AI) values reflect qualitative changes in antibody   concentration that cannot be directly associated with clinical condition or   disease state.       Component      Latest Ref Rng & Units 6/19/2020   WBC      4.0 - 11.0 10e9/L 9.7   RBC Count      3.8 - 5.2 10e12/L 3.95   Hemoglobin      11.7 - 15.7 g/dL 11.9   Hematocrit      35.0 - 47.0 % 37.8   MCV      78 - 100 fl 96   MCH      26.5 - 33.0 pg 30.1   MCHC      31.5 - 36.5 g/dL 31.5   RDW      10.0 - 15.0 % 14.6   Platelet Count      150 - 450 10e9/L 209   Diff Method        Automated Method   % Neutrophils      % 76.5   % Lymphocytes      % 9.7   % Monocytes      % 11.6   % Eosinophils      % 1.1   % Basophils      % 0.3   % Immature Granulocytes      % 0.8   Nucleated RBCs      0 /100 0   Absolute Neutrophil      1.6 - 8.3 10e9/L 7.4   Absolute Lymphocytes      0.8 - 5.3 10e9/L 0.9   Absolute Monocytes      0.0 - 1.3 10e9/L 1.1   Absolute Eosinophils      0.0 - 0.7 10e9/L 0.1   Absolute Basophils      0.0 - 0.2 10e9/L 0.0   Abs Immature Granulocytes      0 - 0.4 10e9/L 0.1   Absolute Nucleated RBC       0.0   Color Urine       Straw   Appearance Urine       Clear   Glucose Urine      NEG:Negative mg/dL Negative   Bilirubin Urine      NEG:Negative Negative   Ketones Urine      NEG:Negative mg/dL Negative   Specific Gravity Urine      1.003 - 1.035 1.005   Blood Urine      NEG:Negative Negative   pH Urine      5.0 - 7.0 pH 6.0   Protein Albumin Urine      NEG:Negative mg/dL Negative   Urobilinogen mg/dL      0.0 - 2.0 mg/dL 0.0   Nitrite Urine      NEG:Negative Negative   Leukocyte Esterase Urine      NEG:Negative Negative   Source       Midstream Urine   WBC Urine      0 - 5 /HPF <1   RBC Urine      0 - 2 /HPF 1   Bacteria Urine      NEG:Negative /HPF Few (A)   Albumin Fraction      3.7 - 5.1 g/dL 3.7   Alpha 1 Fraction      0.2 - 0.4 g/dL 0.4   Alpha 2 Fraction      0.5 - 0.9 g/dL 1.1 (H)   Beta Fraction      0.6 - 1.0 g/dL 1.2 (H)   Gamma Fraction      0.7 - 1.6 g/dL 1.2   Monoclonal Peak      0.0 g/dL 0.2 (H)   ELP Interpretation:       Small monoclonal protein (0.2 g/dL) seen in the gamma fraction. See immunofixation report . . .   Immunofixation ELP       (Note)   IGG      610 - 1,616 mg/dL 1,196   IGA      84 - 499 mg/dL 639 (H)   IGM      35 - 242 mg/dL 122   Creatinine Urine      mg/dL 9   Albumin Urine mg/L      mg/L 9   Albumin Urine mg/g Cr      0 - 25 mg/g Cr 103.31 (H)   Protein Random Urine      g/L 0.07   Protein Total Urine g/gr Creatinine      0 - 0.2 g/g Cr 0.78 (H)  "  Rheumatoid Factor      <12 IU/mL <7   Cyclic Citrullinated Peptide Antibody, IgG      <7 U/mL 2   Immunofix ELP Urine       No monoclonal protein seen on immunofixation.  Pathological significance requires clinical . . .   Vitamin D Deficiency screening      20 - 75 ug/L 45   Cryoglobulin      NEG:Negative % Trace (A)   Sed Rate      0 - 30 mm/h 59 (H)   CRP Inflammation      0.0 - 8.0 mg/L 88.3 (H)   Complement C3      81 - 157 mg/dL 142   Complement C4      13 - 39 mg/dL 33   AST      0 - 45 U/L 10   Albumin      3.4 - 5.0 g/dL 3.2 (L)   ALT      0 - 50 U/L 12           Betty Tee is a 80 year old female who is being evaluated via a billable video visit.      The patient has been notified of following:     \"This video visit will be conducted via a call between you and your physician/provider. We have found that certain health care needs can be provided without the need for an in-person physical exam.  This service lets us provide the care you need with a video conversation.  If a prescription is necessary we can send it directly to your pharmacy.  If lab work is needed we can place an order for that and you can then stop by our lab to have the test done at a later time.    Video visits are billed at different rates depending on your insurance coverage.  Please reach out to your insurance provider with any questions.    If during the course of the call the physician/provider feels a video visit is not appropriate, you will not be charged for this service.\"    Patient has given verbal consent for Video visit? Yes    How would you like to obtain your AVS? Mail a copy    Will anyone else be joining your video visit? No        Video-Visit Details    Type of service:  Video Visit    Video Start Time: 4:30 pm  Video End Time: 5 pm    Originating Location (pt. Location): Home    Distant Location (provider location):   [facility]     Platform used for Video Visit: Thad Lopez MD            "

## 2020-07-01 NOTE — PROGRESS NOTES
[facility]  Rheumatology Clinic Virtual Visit Note  Zulma Lopez MD  DOS:  2020   Date of last visit: 2020    Name: Betty Tee  MRN: [id number]  Age: 80 year old  : 1940  Reason for visit: Follow up visit for B/L hip pain, primary Sjogren's syndrome    #B/L hip trochanteric bursitis  # Sjogren's syndrome since  with borderline +RG, +SSA, and lip biopsy focus score of 1.  # prednisone-responsive polyarthritis  # Follicular bronchiolitis, prior mild ILD  # Osteopenia on DEXA 2019 with T score=-2.1 with decline in bone density  # Chronic prednisone use  # DM  # A fib     Assessment and Plan:    New Dx of PMR. Hip pain, inflammation markers improved on prednisone    Primary Sjogren's syndrome with ILD   Sister Sita returns with stable PFTs and improvement on most recent chest CT showing bronchiolitis, but no ILD. Completed pulmonary rehab in 2019 where she was able to exercise without oxygen.     On HCQ since 2019 with significant  Improvement of joint pain.    Tolerates HCQ well. Her dry mouth is tolerable. Breathing is stable. No other complaints today.      Plan:  Prednisone 15-12.5-10-9-8-7-6 mg a day each for 2 weeks then 5 mg a day    Please monitor your blood sugar closely on prednisone    ESR, CRP next week (ordered)    Stay on fosamax weekly    Yearly eye exam    Return in 2 months        Orders Placed This Encounter   Procedures     Erythrocyte sedimentation rate auto     CRP inflammation       Video Start Time: 4:30 pm  Video End Time: 5 pm    Zulma Lopez MD      Subjective:  Betty Tee is a 79 year old female with a history of primary Sjogren's syndrome who presents for follow-up in the setting of recent pain flare in her bilateral hips x several weeks. The pain has been more intense and longer lasting than previous joint pain. Lidocaine patches have helped, and she increased her prednisone (20 mg x 5 days, then 7.5 mg through today) per rheumatology but it  did not help the pain. She reports this recent pain flare has made her feel discouraged and down.     HPI:   Betty Tee is a 79 year old female with a history of primary Sjogren's syndrome who presents for follow-up. She was diagnosed with Sjogren's syndrome in 2015 with borderline +RG, +SSA, and lip biopsy focus score of 1. Associated ILD and joint pain are prednisone-responsive which support the diagnosis. Ocular staining score has been deferred given other sources of diagnosis.    Interval history: 6/7/18   Today, the patient states she is feeling short of breath as she did not bring her oxygen tank with her. She states she has been feeling a bit more short of breath at home while going up and down her basement steps recently. She last saw her pulmonologist, Dr. Walsh, on 1/10/2018 and does not have recent pulmonary function tests. She has not needed to increase her oxygen levels at home, noting she has always used 4 liters, which seems to be sufficient for her. When she is out of the house she frequently leaves her oxygen tank at home. She does currently have an irritating, non-productive cough that began approximately one week ago with associated postnasal drip. She has been experiencing night sweats and infrequent chills this week but denies any measured fevers.     She denies any joint pains today, but does state she had a flare of joint pain, specifically in her right knee, a few weeks ago. The pain hit very intensely prior to resolving.      With regards to starting rituximab, she voices she discussed this with her primary care provider who does not feel this is a good idea.     Interval history: 3/8/18  Patient reported doing rather well but did mention some trouble with intermittent arthralgias. We discussed consideration of rituximab given problems stemming from long-term prednisone use and she elected to think about this option. Plan was made for continuation of 10mg of prednisone daily for the  time being. Recommended starting Fosamax, 70mg, once per week, however she was hesitant about side effects.     8/16/2019: Former pt of Dr. Stringer is here today to transfer care. Last night, woke up with pain over R ribs. Similar pain happened in 7/2019, self-resolved. Her x-rays at that time were suggestive of questionable rib Fx. On  mg bid+prednisone 6/8 mg every other day (5/13/2019). Dry mouth is bothersome, using OTC biotene but CPAP makes it worse. Report losing bladder control on occasions.     (1/15/2020): Patient returns today with several weeks of intense bilateral hip pain. It's more intense and has been longer-lasting than previous joint pain. She was on a prednisone taper and was at 5 mg daily when this pain started. Per rheumatology, she went up to 20 mg prednisone daily x 5 days, now 7.5 mg daily x 7 days but the prednisone increase did not help the pain. Lidocaine patches have helped her hip pain. Her dry mouth has been tolerable, she's taking evoxac once per day. No changes in breathing.       Today 7/1/2020: Feeling much better after taking the prednisone 20 mg every day taper, hip pain was gone,coming back after prednisone taper. On fosamax since last visit.    Review of Systems:   A comprehensive ROS was done. Positives are per HPI.      Active Medications:     Outpatient Medications Prior to Visit   Medication Sig Dispense Refill     acetaminophen (TYLENOL) 500 MG tablet Take 1,000 mg by mouth every 8 hours as needed (max 6 tablets/24 hours, 2 tablets/dose)        albuterol (PROAIR HFA, PROVENTIL HFA, VENTOLIN HFA) 108 (90 BASE) MCG/ACT inhaler Inhale 2 puffs into the lungs every 6 hours 1 Inhaler 3     alendronate (FOSAMAX) 70 MG tablet Take 1 tablet (70 mg) by mouth every 7 days 12 tablet 1     atorvastatin (LIPITOR) 10 MG tablet TAKE 1/2 TABLET BY MOUTH DAILY 45 tablet 0     azithromycin (ZITHROMAX) 250 MG tablet Take 1 tablet (250 mg) by mouth daily 30 tablet 11     CEVIMELINE 30 MG  PO capsule TAKE 1 CAPSULE (30 MG) BY MOUTH 3 TIMES DAILY 270 capsule 2     escitalopram (LEXAPRO) 20 MG tablet TAKE 1 TABLET BY MOUTH EVERY DAY 90 tablet 0     fluticasone (FLONASE) 50 MCG/ACT nasal spray Spray 1-2 sprays into both nostrils daily as needed for allergies 18.2 mL 11     fluticasone-vilanterol (BREO ELLIPTA) 100-25 MCG/INH inhaler Inhale 1 puff into the lungs daily 1 Inhaler 11     gabapentin (NEURONTIN) 300 MG capsule Take 1 capsule (300 mg) by mouth 3 times daily 90 capsule 0     hydroxychloroquine (PLAQUENIL) 200 MG tablet TAKE 2 TABLETS (400 MG) BY MOUTH DAILY ANNUAL PLAQUENIL TOXICITY EYE SCREENING REQUIRED. 180 tablet 1     insulin glargine (BASAGLAR KWIKPEN) 100 UNIT/ML pen INJECT 22 UNITS SUBCUTANEOUS DAILY (TO REPLACE LANTUS) 15 mL 0     lidocaine (LIDODERM) 5 % patch Apply patch to painful area for up to 12 h within a 24 h period.  Remove after 12 hours. 30 patch 5     loratadine (CLARITIN) 10 MG tablet TAKE 1 TABLET BY MOUTH EVERY DAY 90 tablet 1     metoprolol succinate ER (TOPROL-XL) 25 MG 24 hr tablet Take 0.5 tablets (12.5 mg) by mouth 2 times daily Take 50 mg by mouth in combination with 12.5 for a total of 62.5 twice a day 90 tablet 3     metoprolol succinate ER (TOPROL-XL) 50 MG 24 hr tablet Take 50 mg by mouth in combination with 12.5 for a total of 62.5 twice a day 180 tablet 3     montelukast (SINGULAIR) 10 MG tablet TAKE 1 TABLET BY MOUTH EVERYDAY AT BEDTIME 30 tablet 5     NOVOLOG FLEXPEN 100 UNIT/ML soln INJECT 12 UNITS SUBCUTANEOUS 3 TIMES DAILY (WITH MEALS) (Patient taking differently: Inject 6 Units Subcutaneous 3 times daily (with meals) ) 15 mL 1     OZEMPIC, 0.25 OR 0.5 MG/DOSE, 2 MG/1.5ML SOPN INJECT 0.5 MG SUBCUTANEOUS ONCE A WEEK 4.5 mL 0     potassium chloride ER (KLOR-CON) 20 MEQ CR tablet Take 2 tablets (40 mEq) by mouth every morning AND 1 tablet (20 mEq) every evening. 270 tablet 3     rivaroxaban ANTICOAGULANT (XARELTO ANTICOAGULANT) 20 MG PO TABS tablet Take 1  tablet (20 mg) by mouth daily (with dinner) 90 tablet 2     spironolactone (ALDACTONE) 25 MG tablet Take 2 tablets (50 mg) by mouth daily 180 tablet 3     torsemide (DEMADEX) 10 MG tablet TAKE ONE TAB (10 MG) WITH ONE 20 MG TAB TO = 30 MG DAILY IN AFTERNOON. 90 tablet 3     torsemide (DEMADEX) 20 MG tablet TAKE 2 TABS (40 MG) IN AM DAILY AND TAKE 1 TAB BY MOUTH IN THE AFTERNOON ALONG W/ A 10 MG  tablet 4     traZODone (DESYREL) 50 MG tablet TAKE 0.5-1.5 TABLETS (25-75 MG) BY MOUTH NIGHTLY AS NEEDED FOR SLEEP 135 tablet 0     vitamin D3 (CHOLECALCIFEROL) 2000 units (50 mcg) tablet Take 1 tablet (2,000 Units) by mouth daily 90 tablet 3     order for DME Equipment being ordered: Oxygen  Pulsed oxygen portable tank  Rate: 4LNC  Diagnosis: ILD  Duration: Lifetime -99 1 Device 1     acyclovir (ZOVIRAX) 5 % external ointment Apply topically 6 times daily As needed for outbreaks (Patient not taking: Reported on 6/23/2020) 15 g 3     HYDROcodone-acetaminophen (NORCO) 5-325 MG tablet Take 1 tablet by mouth every 12 hours as needed for severe pain (Patient not taking: Reported on 6/23/2020) 60 tablet 0     ketoconazole (NIZORAL) 2 % external cream Apply topically 2 times daily as needed for itching (Patient not taking: Reported on 6/23/2020) 60 g 1     predniSONE (DELTASONE) 5 MG tablet 4tab=20 mg qd x 5 days, 3tab=15 mg qd x 5 days, 2tab=10 mg qd x 5 days, then 1 tab=5 mg qd and stay on it 90 tablet 1     No facility-administered medications prior to visit.      Allergies:   Augmentin\  Codeine  Phenobarbital  Seasonal allergies      Past Medical History:  Alcohol abuse, in remission.   Allergic rhinitis.   Antiplatelet long-term use.   Atrial fibrillation.   Cardiomegaly.   Osteopenia.   Diverticulosis.   Benign essential hypertension.   GERD.   Insomnia.   Irregular heart beat.   Lumbago.   Major depressive disorder, recurrent episode, moderate.   ANGELICA.  Osteoarthrosis.   Sjogren's syndrome.   Sleep apnea.   Tobacco  use disorder.   TMJ disorder.   RLS.  Major depressive disorder.   Hypertension.   Sciatica.   Colouterine fistula.   Left ventricular hypertrophy.   Breat fibroadenoma.   DVT.   Fatty liver disease, nonalcoholic.   Rib pain.   Neck mass.   Interstitial lung disease.   Acute and chronic respiratory failure with hypoxia.   Type II diabetes mellitus.   Hyperlipidemia.   Inflammatory arthritis.      Past Surgical History:  Back surgery.   Left breast biopsy.   Appendectomy.   Exploratory laparotomy.   Cardiac surgery.   Cholecystectomy.   Left colectomy.   Total abdominal hysterectomy with bilateral salpingo-oophorectomy.   Insert ureter stent.   Flexible sigmoidoscopy.   Ileostomy takedown.     Family History:   Mother: Positive for CAD, heart disease, hypertension, cerebrovascular disease, hyperlipidemia.   Father: Positive for alcohol/drug abuse, Alzheimer disease, dementia, hypertension, hyperlipidemia.   Sister: Positive for CAD, hypertension, and colorectal cancer.   Sister: Positive for hypertension and hyperlipidemia.   Sister: Positive for diabetes, lung cancer, asthma, and heart disease.   Brother: Positive for Parkinsonism and substance abuse.   Brother: Positive for hypertension, diverticulitis, and prostate cancer.   Daughter: Positive for breast cancer.        Social History:   She is a former smoker; quit in 2011. She has a history of alcohol abuse but stopped drinking in 1986.      Physical Exam:   Constitutional: Well-developed, appearing stated age; cooperative  Eyes: Normal EOM, conjunctiva, sclera  MS: no synovitis  Skin: No alopecia, rash  Neuro: grossly non-focal  Psych: Normal affect.    Images:  CT Chest w/o contrast (7/25/18):  Findings remain most consistent with small airway disease  and/or nonclassifiable interstitial lung disease and are not  significantly changed from the March 2017 comparison.    Per radiology.    Laboratory:   RHEUM RESULTS Latest Ref Rng & Units 3/4/2020 3/4/2020  6/19/2020   COMPLEMENT C3 81 - 157 mg/dL - - 142   COMPLEMENT C4 13 - 39 mg/dL - - 33   SED RATE 0 - 30 mm/h - - 59(H)   CRP, INFLAMMATION 0.0 - 8.0 mg/L - - 88.3(H)   CK TOTAL 30 - 225 U/L - - -   RHEUMATOID FACTOR <12 IU/mL - - <7   RG SCREEN BY EIA <1.0 - - -   AST 0 - 45 U/L - - 10   ALT 0 - 50 U/L - - 12   ALBUMIN 3.4 - 5.0 g/dL - - 3.2(L)   WBC 4.0 - 11.0 10e9/L 10.6 10.1 9.7   RBC 3.8 - 5.2 10e12/L 4.38 4.06 3.95   HGB 11.7 - 15.7 g/dL 13.2 12.3 11.9   HCT 35.0 - 47.0 % 41.3 38.4 37.8   MCV 78 - 100 fl 94 95 96   MCHC 31.5 - 36.5 g/dL 32.0 32.0 31.5   RDW 10.0 - 15.0 % 13.9 14.1 14.6    - 450 10e9/L 203 206 209   CREATININE 0.52 - 1.04 mg/dL 0.92 - 0.83   GFR ESTIMATE, IF BLACK >60 mL/min/[1.73:m2] 68 - 77   GFR ESTIMATE >60 mL/min/[1.73:m2] 59(L) - 67    - 1,616 mg/dL - - 1,196   IGA 84 - 499 mg/dL - - 639(H)   IGM 35 - 242 mg/dL - - 122       Rheumatoid Factor   Date Value Ref Range Status   07/24/2015 <20 <20 IU/mL Final   ,  ,   Cyclic Cit Pept IgG/IgA   Date Value Ref Range Status   07/24/2015 <20  Interpretation:  Negative   <20 UNITS Final   ,  ,   Scleroderma Antibody Scl-70 CECILIA IgG   Date Value Ref Range Status   07/24/2015  0.0 - 0.9 AI Final    <0.2  Negative   Antibody index (AI) values reflect qualitative changes in antibody   concentration that cannot be directly associated with clinical condition or   disease state.       SSA (Ro) (CECILIA) Antibody, IgG   Date Value Ref Range Status   07/24/2015 1.6 (H) 0.0 - 0.9 AI Final     Comment:     Positive   Antibody index (AI) values reflect qualitative changes in antibody   concentration that cannot be directly associated with clinical condition or   disease state.       SSB (La) (CECILIA) Antibody, IgG   Date Value Ref Range Status   07/24/2015  0.0 - 0.9 AI Final    <0.2  Negative   Antibody index (AI) values reflect qualitative changes in antibody   concentration that cannot be directly associated with clinical condition or   disease  state.       ,  ,   RG Screen by EIA   Date Value Ref Range Status   07/24/2015 <1.0  Interpretation:  Negative   <1.0 Final   ,  ,  ,  ,  ,  ,  ,  ,  ,  ,  ,  ,  ,  ,  ,  ,  ,  ,   Neutrophil Cytoplasmic IgG Antibody   Date Value Ref Range Status   07/24/2015   Final    <1:20  Reference range: <1:20  (Note)  The ANCA IFA is <1:20; therefore, no further testing will  be performed.  INTERPRETIVE INFORMATION: Anti-Neutrophil Cyto Ab, IgG  Neutrophil Cytoplasmic Antibodies (C-ANCA = granular  cytoplasmic staining, P-ANCA = perinuclear staining) are  found in the serum of over 90 percent of patients with  certain necrotizing systemic vasculitides, and usually in  less than 5 percent of patients with collagen vascular  disease or arthritis.  Performed by [facility],  [address] [place] [id number] [phone]  [email or web address], Burke Mckeon MD, Lab. Director       ,  ,  ,  ,  ,  ,  ,   IGG   Date Value Ref Range Status   07/24/2015 1320 695 - 1620 mg/dL Final   ,  ,  ,  ,  ,   Scleroderma Antibody Scl-70 CECILIA IgG   Date Value Ref Range Status   07/24/2015  0.0 - 0.9 AI Final    <0.2  Negative   Antibody index (AI) values reflect qualitative changes in antibody   concentration that cannot be directly associated with clinical condition or   disease state.       Component      Latest Ref Rng & Units 6/19/2020   WBC      4.0 - 11.0 10e9/L 9.7   RBC Count      3.8 - 5.2 10e12/L 3.95   Hemoglobin      11.7 - 15.7 g/dL 11.9   Hematocrit      35.0 - 47.0 % 37.8   MCV      78 - 100 fl 96   MCH      26.5 - 33.0 pg 30.1   MCHC      31.5 - 36.5 g/dL 31.5   RDW      10.0 - 15.0 % 14.6   Platelet Count      150 - 450 10e9/L 209   Diff Method       Automated Method   % Neutrophils      % 76.5   % Lymphocytes      % 9.7   % Monocytes      % 11.6   % Eosinophils      % 1.1   % Basophils      % 0.3   % Immature Granulocytes      % 0.8   Nucleated RBCs      0 /100 0   Absolute Neutrophil      1.6 - 8.3 10e9/L 7.4   Absolute  Lymphocytes      0.8 - 5.3 10e9/L 0.9   Absolute Monocytes      0.0 - 1.3 10e9/L 1.1   Absolute Eosinophils      0.0 - 0.7 10e9/L 0.1   Absolute Basophils      0.0 - 0.2 10e9/L 0.0   Abs Immature Granulocytes      0 - 0.4 10e9/L 0.1   Absolute Nucleated RBC       0.0   Color Urine       Straw   Appearance Urine       Clear   Glucose Urine      NEG:Negative mg/dL Negative   Bilirubin Urine      NEG:Negative Negative   Ketones Urine      NEG:Negative mg/dL Negative   Specific Gravity Urine      1.003 - 1.035 1.005   Blood Urine      NEG:Negative Negative   pH Urine      5.0 - 7.0 pH 6.0   Protein Albumin Urine      NEG:Negative mg/dL Negative   Urobilinogen mg/dL      0.0 - 2.0 mg/dL 0.0   Nitrite Urine      NEG:Negative Negative   Leukocyte Esterase Urine      NEG:Negative Negative   Source       Midstream Urine   WBC Urine      0 - 5 /HPF <1   RBC Urine      0 - 2 /HPF 1   Bacteria Urine      NEG:Negative /HPF Few (A)   Albumin Fraction      3.7 - 5.1 g/dL 3.7   Alpha 1 Fraction      0.2 - 0.4 g/dL 0.4   Alpha 2 Fraction      0.5 - 0.9 g/dL 1.1 (H)   Beta Fraction      0.6 - 1.0 g/dL 1.2 (H)   Gamma Fraction      0.7 - 1.6 g/dL 1.2   Monoclonal Peak      0.0 g/dL 0.2 (H)   ELP Interpretation:       Small monoclonal protein (0.2 g/dL) seen in the gamma fraction. See immunofixation report . . .   Immunofixation ELP       (Note)   IGG      610 - 1,616 mg/dL 1,196   IGA      84 - 499 mg/dL 639 (H)   IGM      35 - 242 mg/dL 122   Creatinine Urine      mg/dL 9   Albumin Urine mg/L      mg/L 9   Albumin Urine mg/g Cr      0 - 25 mg/g Cr 103.31 (H)   Protein Random Urine      g/L 0.07   Protein Total Urine g/gr Creatinine      0 - 0.2 g/g Cr 0.78 (H)   Rheumatoid Factor      <12 IU/mL <7   Cyclic Citrullinated Peptide Antibody, IgG      <7 U/mL 2   Immunofix ELP Urine       No monoclonal protein seen on immunofixation.  Pathological significance requires clinical . . .   Vitamin D Deficiency screening      20 - 75 ug/L 45    Cryoglobulin      NEG:Negative % Trace (A)   Sed Rate      0 - 30 mm/h 59 (H)   CRP Inflammation      0.0 - 8.0 mg/L 88.3 (H)   Complement C3      81 - 157 mg/dL 142   Complement C4      13 - 39 mg/dL 33   AST      0 - 45 U/L 10   Albumin      3.4 - 5.0 g/dL 3.2 (L)   ALT      0 - 50 U/L 12

## 2020-07-01 NOTE — PROGRESS NOTES
"Betty Tee is a 80 year old female who is being evaluated via a billable video visit.      The patient has been notified of following:     \"This video visit will be conducted via a call between you and your physician/provider. We have found that certain health care needs can be provided without the need for an in-person physical exam.  This service lets us provide the care you need with a video conversation.  If a prescription is necessary we can send it directly to your pharmacy.  If lab work is needed we can place an order for that and you can then stop by our lab to have the test done at a later time.    Video visits are billed at different rates depending on your insurance coverage.  Please reach out to your insurance provider with any questions.    If during the course of the call the physician/provider feels a video visit is not appropriate, you will not be charged for this service.\"    Patient has given verbal consent for Video visit? Yes    How would you like to obtain your AVS? Mail a copy    Will anyone else be joining your video visit? No        Video-Visit Details    Type of service:  Video Visit    Video Start Time: 4:30 pm  Video End Time: 5 pm    Originating Location (pt. Location): Home    Distant Location (provider location):  [facility] RHEUMATOLOGY     Platform used for Video Visit: Thad Lopez MD        "

## 2020-07-02 RX ORDER — PREDNISONE 5 MG/1
TABLET ORAL
Qty: 150 TABLET | Refills: 3 | Status: SHIPPED | OUTPATIENT
Start: 2020-07-02 | End: 2020-07-29

## 2020-07-02 RX ORDER — PREDNISONE 1 MG/1
TABLET ORAL
Qty: 90 TABLET | Refills: 2 | Status: SHIPPED | OUTPATIENT
Start: 2020-07-02 | End: 2020-10-29

## 2020-07-06 NOTE — TELEPHONE ENCOUNTER
CW,   FYI:  Called pt again  Another sister answered  Was told to callback and leave another VM  Left VM #3  Also sent MyChart to pt   At this time have been unable to connect with pt  Lydia HALEY RN

## 2020-07-10 DIAGNOSIS — M35.3 POLYMYALGIA RHEUMATICA (H): ICD-10-CM

## 2020-07-10 LAB
CRP SERPL-MCNC: 17 MG/L (ref 0–8)
ERYTHROCYTE [SEDIMENTATION RATE] IN BLOOD BY WESTERGREN METHOD: 30 MM/H (ref 0–30)

## 2020-07-10 NOTE — RESULT ENCOUNTER NOTE
Labs look much better. SR is back to normal. Significant improvement of CRP. This supports PMR diagnosis. How is your pain?

## 2020-07-10 NOTE — RESULT ENCOUNTER NOTE
Labs look much better. ESR is back to normal. Significant improvement of CRP. This supports PMR diagnosis. How is your pain?

## 2020-07-10 NOTE — LETTER
July 10, 2020      Betty Tee  [address]  [facility] [address]        Dear ,    We are writing to inform you of your test results.    Labs look much better. ESR is back to normal. Significant improvement of CRP. This supports PMR diagnosis. How is your pain?    Resulted Orders   CRP inflammation   Result Value Ref Range    CRP Inflammation 17.0 (H) 0.0 - 8.0 mg/L   Erythrocyte sedimentation rate auto   Result Value Ref Range    Sed Rate 30 0 - 30 mm/h       If you have any questions or concerns, please call the clinic at the number listed above.       Sincerely,        Zulma Lopez MD

## 2020-07-20 DIAGNOSIS — E11.9 TYPE 2 DIABETES MELLITUS WITHOUT COMPLICATION, WITHOUT LONG-TERM CURRENT USE OF INSULIN (H): ICD-10-CM

## 2020-07-22 ENCOUNTER — THERAPY VISIT (OUTPATIENT)
Dept: PHYSICAL THERAPY | Facility: CLINIC | Age: 80
End: 2020-07-22
Payer: MEDICARE

## 2020-07-22 DIAGNOSIS — M25.551 HIP PAIN, RIGHT: ICD-10-CM

## 2020-07-22 DIAGNOSIS — M54.50 LUMBAR PAIN: ICD-10-CM

## 2020-07-22 PROCEDURE — 97161 PT EVAL LOW COMPLEX 20 MIN: CPT | Mod: GP | Performed by: PHYSICAL THERAPIST

## 2020-07-22 RX ORDER — PEN NEEDLE, DIABETIC 32GX 5/32"
NEEDLE, DISPOSABLE MISCELLANEOUS
Qty: 400 EACH | Refills: 1 | Status: SHIPPED | OUTPATIENT
Start: 2020-07-22 | End: 2020-07-29

## 2020-07-22 RX ORDER — BLOOD SUGAR DIAGNOSTIC
STRIP MISCELLANEOUS
Qty: 300 STRIP | Refills: 5 | Status: SHIPPED | OUTPATIENT
Start: 2020-07-22 | End: 2020-07-29

## 2020-07-22 NOTE — LETTER
DEPARTMENT OF HEALTH AND HUMAN SERVICES  CENTERS FOR MEDICARE & MEDICAID SERVICES    PLAN/UPDATED PLAN OF PROGRESS FOR OUTPATIENT REHABILITATION    PATIENTS NAME:  Betty Tee     : 1940    PROVIDER NUMBER:    [id number]    HICN:  [id number]    PROVIDER NAME: [facility]    MEDICAL RECORD NUMBER: [id number]     START OF CARE DATE:  SOC Date: 20   TYPE:  PT    PRIMARY/TREATMENT DIAGNOSIS: (Pertinent Medical Diagnosis)     Lumbar pain  Hip pain, right    VISITS FROM START OF CARE:  Rxs Used: 1     [facility] Initial Evaluation  Subjective:    Patient Health History  Betty Tee being seen for LBP/R hip/groin pain.     Problem began: 2020 (MD appt).   Problem occurred: Patient reporting chroninc LBP and past LB surgery, more recent fall in her home with R hip forcefully abducted during   Pain is reported as 4/10 on pain scale.  General health as reported by patient is good.  Pertinent medical history includes: diabetes, heart problems, high blood pressure and overweight.   Red flags:  None as reported by patient.  Medical allergies: none.   Surgeries include:  Orthopedic surgery (LB surgery).    Current medications:  Anti-inflammatory, bone density, cardiac medication, high blood pressure medication and pain medication.    Current occupation is Retired  nun.   Primary job tasks: N/A.                  Therapist Generated HPI Evaluation        Type of problem:  Lumbar.  This is a chronic condition.  Condition occurred with:  Insidious onset.  Patient reports pain:  Lumbar spine right.  Pain is described as aching and sharp and is constant.  Pain radiates to:  Gluteals right (R groin). Pain is the same all the time.    Associated symptoms:  Loss of motion/stiffness and loss of strength. Symptoms are exacerbated by sitting, standing, walking, lifting and bending  and relieved by rest.  Special tests included:  MRI  (Lumbar MRI February 8, 2020 shows diffuse lumbar spondylosis, trace anterolisthesis L5 on S1, lateral recess stenosis L4-5 bilaterally and on the right at L5-S1).  Past treatment: lumbar epidural injection. There was moderate (temporary) improvement following previous treatment.  Work activity restrictions: N/A.  Barriers include:  None as reported by patient.  Objective:  Standing Alignment:    Lumbar:  Lordosis decr  Hip deviations alignment: unable to fully extend R hip, with right knee pain and ankle PF.  Gait:    Gait Type:  Antalgic   Weight Bearing Status:  WBAT   Assistive Devices:  Walker  Deviations:  Lumbar:  Trunk flexionHip:  Trendelenberg RKnee:  Knee extension decr RAnkle:  Toe walkng RGeneral Deviations:  Stance time decr and mckenna decr  Flexibility/Screens:   Positive screens:  Hip  Lower Extremity:  Decreased right lower extremity flexibility:  Hip IR's; Hip ER's; Adductors and Hip Flexors  Lumbar/SI Evaluation  ROM:    AROM Lumbar:   Flexion:        Min loss  Ext:                    Max loss   Side Bend:        Left:     Right:   Rotation:           Left:     Right:   Side Glide:        Left:     Right:     Hip Evaluation  HIP AROM:    Flexion: Left: 120    Right:  95  Extension: Left: 5    Right:  Lacking 10 degrees   Abduction: Left:  45    Right:  30  Internal Rotation: Left: 15    Right: 0  External Rotation: Left: 40    Right: 25    Hip PROM:    Pain: limiting R hip AROM  Endfeel: hard    Hip Strength:    Flexion:   Left: 4/5   -  Pain:  Right: 3-/5   ++  Pain:                   Extension:  Left: 4-/5  -  Pain:Right: 3-/5    ++  Pain:    Abduction:  Left: 4/5    -   Pain:Right: 3-/5   ++   Pain:  Hip Special Testing:      Left hip negative for the following special tests:  Jeronimo   Right hip positive for the following special tests:  Panfilo      Functional Testing:            Proprioception:  Stork balance test:   Left:    Right:  Unable due to pain  % of Uninvolved:    Assessment/Plan:    Patient is a 80 year old female with lumbar and R hip complaints(question DJD).    Patient has the following significant findings with corresponding treatment plan.                Diagnosis 1:  LBP(questionable R hip DJD)  Pain -  manual therapy, self management, education and home program  Decreased ROM/flexibility - manual therapy, therapeutic exercise, therapeutic activity and home program  Decreased strength - therapeutic exercise, therapeutic activities and home program  Impaired gait - gait training and home program  Decreased function - therapeutic activities and home program    Therapy Evaluation Codes:   1) History comprised of:   Personal factors that impact the plan of care:      Age and Time since onset of symptoms.    Comorbidity factors that impact the plan of care are:      None.     Medications impacting care: None.  2) Examination of Body Systems comprised of:   Body structures and functions that impact the plan of care:      Hip and Lumbar spine.   Activity limitations that impact the plan of care are:      Bathing, Dressing, Sitting, Standing and Walking.  3) Clinical presentation characteristics are:   Stable/Uncomplicated.  4) Decision-Making    Low complexity using standardized patient assessment instrument and/or measureable assessment of functional outcome.  Cumulative Therapy Evaluation is: Low complexity.    Previous and current functional limitations:  (See Goal Flow Sheet for this information)    Short term and Long term goals: (See Goal Flow Sheet for this information)     Communication ability:  Patient appears to be able to clearly communicate and understand verbal and written communication and follow directions correctly.  Treatment Explanation - The following has been discussed with the patient:   RX ordered/plan of care  Anticipated outcomes  Possible risks and side effects  This patient would benefit from PT intervention to resume normal activities.   Rehab  "potential is fair to good.  Frequency:  1 X week, once daily  Duration:  for 6 weeks  Discharge Plan:  Achieve all LTG.  Independent in home treatment program.  Reach maximal therapeutic benefit.    Caregiver Signature/Credentials _____________________________ Date ________       Treating Provider: Pedro   I have reviewed and certified the need for these services and plan of treatment while under my care.        PHYSICIAN'S SIGNATURE:   _________________________________________  Date___________   Juan Diego See MD    Certification period:  Beginning of Cert date period: 07/22/20 to  End of Cert period date: 10/19/20     Functional Level Progress Report: Please see attached \"Goal Flow sheet for Functional level.\"    ____X____ Continue Services or       ________ DC Services                Service dates: From  SOC Date: 07/22/20 date to present                         "

## 2020-07-22 NOTE — TELEPHONE ENCOUNTER
"Prescription approved per [facility] Refill Protocol.  Sophia Hemphill RN    Requested Prescriptions   Signed Prescriptions Disp Refills    blood glucose (ACCU-CHEK SHANNON PLUS) test strip 300 strip 5     Sig: USE TO TEST BLOOD SUGARS 3 TIMES DAILY       Diabetic Supplies Protocol Failed - 7/20/2020 10:37 PM        Failed - Medication is active on med list        Passed - Patient is 18 years of age or older        Passed - Recent (6 mo) or future (30 days) visit within the authorizing provider's specialty     Patient had office visit in the last 6 months or has a visit in the next 30 days with authorizing provider.  See \"Patient Info\" tab in inbasket, or \"Choose Columns\" in Meds & Orders section of the refill encounter.              BD JANE U/F 32G X 4 MM insulin pen needle 400 each 1     Sig: USE 4 DAILY AS DIRECTED.       Diabetic Supplies Protocol Failed - 7/20/2020 10:37 PM        Failed - Medication is active on med list        Passed - Patient is 18 years of age or older        Passed - Recent (6 mo) or future (30 days) visit within the authorizing provider's specialty     Patient had office visit in the last 6 months or has a visit in the next 30 days with authorizing provider.  See \"Patient Info\" tab in inbasket, or \"Choose Columns\" in Meds & Orders section of the refill encounter.               "

## 2020-07-22 NOTE — PROGRESS NOTES
[facility] Initial Evaluation  Subjective:    Patient Health History  Betty Tee being seen for LBP/R hip/groin pain.     Problem began: 6/19/2020 (MD appt).   Problem occurred: Patient reporting chroninc LBP and past LB surgery, more recent fall in her home with R hip forcefully abducted during   Pain is reported as 4/10 on pain scale.  General health as reported by patient is good.  Pertinent medical history includes: diabetes, heart problems, high blood pressure and overweight.   Red flags:  None as reported by patient.  Medical allergies: none.   Surgeries include:  Orthopedic surgery (LB surgery).    Current medications:  Anti-inflammatory, bone density, cardiac medication, high blood pressure medication and pain medication.    Current occupation is Retired  nun.   Primary job tasks: N/A.                  Therapist Generated HPI Evaluation         Type of problem:  Lumbar.    This is a chronic condition.  Condition occurred with:  Insidious onset.    Patient reports pain:  Lumbar spine right.  Pain is described as aching and sharp and is constant.  Pain radiates to:  Gluteals right (R groin). Pain is the same all the time.    Associated symptoms:  Loss of motion/stiffness and loss of strength. Symptoms are exacerbated by sitting, standing, walking, lifting and bending  and relieved by rest.  Special tests included:  MRI (Lumbar MRI February 8, 2020 shows diffuse lumbar spondylosis, trace anterolisthesis L5 on S1, lateral recess stenosis L4-5 bilaterally and on the right at L5-S1).  Past treatment: lumbar epidural injection. There was moderate (temporary) improvement following previous treatment.  Work activity restrictions: N/A.  Barriers include:  None as reported by patient.                        Objective:  Standing Alignment:        Lumbar:  Lordosis decr    Hip deviations alignment: unable to fully extend R hip, with right knee pain and ankle PF.        Gait:    Gait Type:   Antalgic   Weight Bearing Status:  WBAT   Assistive Devices:  Walker  Deviations:  Lumbar:  Trunk flexionHip:  Trendelenberg RKnee:  Knee extension decr RAnkle:  Toe walkng RGeneral Deviations:  Stance time decr and mckenna decr    Flexibility/Screens:   Positive screens:  Hip    Lower Extremity:      Decreased right lower extremity flexibility:  Hip IR's; Hip ER's; Adductors and Hip Flexors               Lumbar/SI Evaluation  ROM:    AROM Lumbar:   Flexion:        Min loss  Ext:                    Max loss   Side Bend:        Left:     Right:   Rotation:           Left:     Right:   Side Glide:        Left:     Right:                                                               Hip Evaluation  HIP AROM:    Flexion: Left: 120    Right:  95    Extension: Left: 5    Right:  Lacking 10 degrees   Abduction: Left:  45    Right:  30      Internal Rotation: Left: 15    Right: 0  External Rotation: Left: 40    Right: 25      Hip PROM:                    Pain: limiting R hip AROM  Endfeel: hard      Hip Strength:    Flexion:   Left: 4/5   -  Pain:  Right: 3-/5   ++  Pain:                    Extension:  Left: 4-/5  -  Pain:Right: 3-/5    ++  Pain:    Abduction:  Left: 4/5    -   Pain:Right: 3-/5   ++   Pain:                  Hip Special Testing:      Left hip negative for the following special tests:  Jeronimo   Right hip positive for the following special tests:  Panfilo      Functional Testing:            Proprioception:    Stork balance test:   Left:    Right:  Unable due to pain  % of Uninvolved:                General     ROS    Assessment/Plan:    Patient is a 80 year old female with lumbar and R hip complaints(question DJD).    Patient has the following significant findings with corresponding treatment plan.                Diagnosis 1:  LBP(questionable R hip DJD)  Pain -  manual therapy, self management, education and home program  Decreased ROM/flexibility - manual therapy, therapeutic exercise, therapeutic  activity and home program  Decreased strength - therapeutic exercise, therapeutic activities and home program  Impaired gait - gait training and home program  Decreased function - therapeutic activities and home program    Therapy Evaluation Codes:   1) History comprised of:   Personal factors that impact the plan of care:      Age and Time since onset of symptoms.    Comorbidity factors that impact the plan of care are:      None.     Medications impacting care: None.  2) Examination of Body Systems comprised of:   Body structures and functions that impact the plan of care:      Hip and Lumbar spine.   Activity limitations that impact the plan of care are:      Bathing, Dressing, Sitting, Standing and Walking.  3) Clinical presentation characteristics are:   Stable/Uncomplicated.  4) Decision-Making    Low complexity using standardized patient assessment instrument and/or measureable assessment of functional outcome.  Cumulative Therapy Evaluation is: Low complexity.    Previous and current functional limitations:  (See Goal Flow Sheet for this information)    Short term and Long term goals: (See Goal Flow Sheet for this information)     Communication ability:  Patient appears to be able to clearly communicate and understand verbal and written communication and follow directions correctly.  Treatment Explanation - The following has been discussed with the patient:   RX ordered/plan of care  Anticipated outcomes  Possible risks and side effects  This patient would benefit from PT intervention to resume normal activities.   Rehab potential is fair to good.    Frequency:  1 X week, once daily  Duration:  for 6 weeks  Discharge Plan:  Achieve all LTG.  Independent in home treatment program.  Reach maximal therapeutic benefit.    Please refer to the daily flowsheet for treatment today, total treatment time and time spent performing 1:1 timed codes.

## 2020-07-23 PROBLEM — M25.551 HIP PAIN, RIGHT: Status: ACTIVE | Noted: 2020-07-23

## 2020-07-24 ENCOUNTER — TELEPHONE (OUTPATIENT)
Dept: PHYSICAL THERAPY | Facility: CLINIC | Age: 80
End: 2020-07-24

## 2020-07-24 DIAGNOSIS — M25.551 HIP PAIN, RIGHT: ICD-10-CM

## 2020-07-24 NOTE — TELEPHONE ENCOUNTER
LVM regarding MDs plans to the in basket message sent to him.  Informed amounts and her options available.

## 2020-07-28 ENCOUNTER — TELEPHONE (OUTPATIENT)
Dept: ORTHOPEDICS | Facility: CLINIC | Age: 80
End: 2020-07-28

## 2020-07-28 DIAGNOSIS — Z11.59 ENCOUNTER FOR SCREENING FOR OTHER VIRAL DISEASES: Primary | ICD-10-CM

## 2020-07-28 NOTE — TELEPHONE ENCOUNTER
RN called patient back. Pleasant lady. Turns out she didn't do her initial injection due to COVID. Now she is ready. RN told patient to call imaging scheduling. After that call our clinic to see Celsa Gilmore to follow up. This is per Dr. See's instructions. Patient expressed understanding.  RN explained to her lumbar injection is both diagnostic and therapeutic. To find out if it's the back or the hip. Patient expressed understanding.    Alex Siegel RN      [facility]    Phone Message    May a detailed message be left on voicemail: yes     Reason for Call: Other: Pt requesting call back from her care team, preferrably Dr. See, Pt needing to discuss next steps in her care     Action Taken: Message routed to:  [facility] ortho

## 2020-07-29 ENCOUNTER — TELEPHONE (OUTPATIENT)
Dept: MEDSURG UNIT | Facility: CLINIC | Age: 80
End: 2020-07-29

## 2020-07-30 ENCOUNTER — TELEPHONE (OUTPATIENT)
Dept: FAMILY MEDICINE | Facility: CLINIC | Age: 80
End: 2020-07-30

## 2020-07-30 NOTE — TELEPHONE ENCOUNTER
Patient Quality Outreach      Summary:    Patient is due/failing the following:   Annual wellness, date due: now    Type of outreach:    Sent ZipList message.    Questions for provider review:    None                                                                                   **Start Working phrase here:**       Patient has the following on her problem list/HM: None      Lydia HALEY RN

## 2020-07-31 ENCOUNTER — TELEPHONE (OUTPATIENT)
Dept: MEDSURG UNIT | Facility: CLINIC | Age: 80
End: 2020-07-31

## 2020-07-31 DIAGNOSIS — Z11.59 ENCOUNTER FOR SCREENING FOR OTHER VIRAL DISEASES: ICD-10-CM

## 2020-07-31 PROCEDURE — U0003 INFECTIOUS AGENT DETECTION BY NUCLEIC ACID (DNA OR RNA); SEVERE ACUTE RESPIRATORY SYNDROME CORONAVIRUS 2 (SARS-COV-2) (CORONAVIRUS DISEASE [COVID-19]), AMPLIFIED PROBE TECHNIQUE, MAKING USE OF HIGH THROUGHPUT TECHNOLOGIES AS DESCRIBED BY CMS-2020-01-R: HCPCS | Performed by: ORTHOPAEDIC SURGERY

## 2020-07-31 NOTE — TELEPHONE ENCOUNTER
Pre-Procedure Unconfirmed COVID Test   Patient had her Covid test this morning.  Will follow-up on results.    Step 1 COVID Screening  The patient was screened for COVID symptoms due to the inability to confirm the patient's COVID status (positive or negative test)    Patient reports the following:  Fever/Chills? No   Cough? No   Shortness of breath? No   New loss of taste or smell? No  Sore throat? No  Muscle or body aches? No  Headaches? No  Fatigue? No  Vomiting or diarrhea? No    Patient informed to contact the ordering provider if any of the symptoms develop prior to the procedure    Step 2 Screening Results (Skip if the patient is negative for symptoms)  If the patient is positive for new or worsening symptoms, contact the ordering provider to determine if the procedure is deemed necessary    Determine if the procedure can be re-scheduled when the patient is symptom free or has a negative COVID test     Step 3 Review Visitor Policy  Patient informed of the updated visitor policy   1 visitor allowed per patient   Visitor must screen negative for COVID symptoms   Visitor must wear a mask   Waiting rooms continue to be closed to visitors    Ghazal Lui RN

## 2020-08-01 LAB
SARS-COV-2 RNA SPEC QL NAA+PROBE: NOT DETECTED
SPECIMEN SOURCE: NORMAL

## 2020-08-03 ENCOUNTER — HOSPITAL ENCOUNTER (OUTPATIENT)
Facility: CLINIC | Age: 80
Discharge: HOME | End: 2020-08-03
Admitting: PHYSICIAN ASSISTANT
Payer: MEDICARE

## 2020-08-03 ENCOUNTER — HOSPITAL ENCOUNTER (OUTPATIENT)
Dept: GENERAL RADIOLOGY | Facility: CLINIC | Age: 80
End: 2020-08-03
Attending: ORTHOPAEDIC SURGERY
Payer: MEDICARE

## 2020-08-03 VITALS
RESPIRATION RATE: 16 BRPM | TEMPERATURE: 97.9 F | HEART RATE: 55 BPM | SYSTOLIC BLOOD PRESSURE: 129 MMHG | OXYGEN SATURATION: 98 % | DIASTOLIC BLOOD PRESSURE: 63 MMHG

## 2020-08-03 DIAGNOSIS — M54.50 LUMBAR PAIN: ICD-10-CM

## 2020-08-03 PROCEDURE — 62323 NJX INTERLAMINAR LMBR/SAC: CPT

## 2020-08-03 PROCEDURE — 25000125 ZZHC RX 250: Performed by: ORTHOPAEDIC SURGERY

## 2020-08-03 PROCEDURE — 25500064 ZZH RX 255 OP 636: Performed by: ORTHOPAEDIC SURGERY

## 2020-08-03 PROCEDURE — 40000863 ZZH STATISTIC RADIOLOGY XRAY, US, CT, MAR, NM

## 2020-08-03 PROCEDURE — 25000128 H RX IP 250 OP 636: Performed by: ORTHOPAEDIC SURGERY

## 2020-08-03 PROCEDURE — 64483 NJX AA&/STRD TFRM EPI L/S 1: CPT

## 2020-08-03 RX ORDER — NICOTINE POLACRILEX 4 MG
15-30 LOZENGE BUCCAL
Status: DISCONTINUED | OUTPATIENT
Start: 2020-08-03 | End: 2020-08-03 | Stop reason: HOSPADM

## 2020-08-03 RX ORDER — DEXTROSE MONOHYDRATE 25 G/50ML
25-50 INJECTION, SOLUTION INTRAVENOUS
Status: DISCONTINUED | OUTPATIENT
Start: 2020-08-03 | End: 2020-08-03 | Stop reason: HOSPADM

## 2020-08-03 RX ORDER — BETAMETHASONE SODIUM PHOSPHATE AND BETAMETHASONE ACETATE 3; 3 MG/ML; MG/ML
3 INJECTION, SUSPENSION INTRA-ARTICULAR; INTRALESIONAL; INTRAMUSCULAR; SOFT TISSUE ONCE
Status: DISCONTINUED | OUTPATIENT
Start: 2020-08-03 | End: 2020-08-03 | Stop reason: CLARIF

## 2020-08-03 RX ORDER — DEXAMETHASONE SODIUM PHOSPHATE 10 MG/ML
20 INJECTION, SOLUTION INTRAMUSCULAR; INTRAVENOUS ONCE
Status: COMPLETED | OUTPATIENT
Start: 2020-08-03 | End: 2020-08-03

## 2020-08-03 RX ORDER — IOPAMIDOL 408 MG/ML
10 INJECTION, SOLUTION INTRATHECAL ONCE
Status: COMPLETED | OUTPATIENT
Start: 2020-08-03 | End: 2020-08-03

## 2020-08-03 RX ADMIN — IOPAMIDOL 0.5 ML: 408 INJECTION, SOLUTION INTRATHECAL at 14:03

## 2020-08-03 RX ADMIN — LIDOCAINE HYDROCHLORIDE 6 ML: 10 INJECTION, SOLUTION EPIDURAL; INFILTRATION; INTRACAUDAL; PERINEURAL at 13:57

## 2020-08-03 RX ADMIN — LIDOCAINE HYDROCHLORIDE 3 ML: 10 INJECTION, SOLUTION EPIDURAL; INFILTRATION; INTRACAUDAL; PERINEURAL at 14:10

## 2020-08-03 RX ADMIN — DEXAMETHASONE SODIUM PHOSPHATE 20 MG: 10 INJECTION, SOLUTION INTRAMUSCULAR; INTRAVENOUS at 14:10

## 2020-08-03 NOTE — PROGRESS NOTES
Care Suites Discharge Nursing Note    Patient Information  Name: Betty Tee  Age: 80 year old    Discharge Education:  Discharge instructions reviewed: Yes  Additional education/resources provided: No  Patient/patient representative verbalizes understanding: Yes  Patient discharging on new medications: No  Medication education completed: N/A    Discharge Plans:   Discharge location: home  Discharge ride contacted: Yes  Approximate discharge time: 1500    Discharge Criteria:  Discharge criteria met and vital signs stable: Yes.  Injection site remains CDI, no swelling.  Able to stand and dress self independently.  Able to ambulate but C/O right groin pain and requires a walker at home.       Patient Belongs:  Patient belongings returned to patient: Yes    Shona Arroyo RN

## 2020-08-03 NOTE — DISCHARGE INSTRUCTIONS
Steroid Injection Discharge Instructions     After you go home:      You may resume your normal diet.    Care of Puncture Site:      If you have a bandaid on your puncture site, you may remove it the next morning    You may shower tomorrow    No bath tubs, whirlpools or swimming for at least 3 days     Activity:      You may go back to normal activity in 24 hours    You should let pain be your guide as to the extent of your activities    Maintain any activity limitations as ordered by your provider    Do NOT drive a vehicle if you develop numbness in your arm or leg    Medicines:      You may resume all medications    Resume your Warfarin/Coumadin at your regular dose today. Follow up with your provider to have your INR rechecked    Resume your Platelet Inhibitors and Aspirin tomorrow at your regular dose    For minor pain, you may take Acetaminophen (Tylenol) or Ibuprofen (Advil)    Pain:       You may experience increased or different pain over the next 24-48 hours    For the next 48 hrs - you may use ice packs for discomfort     Call your primary care doctor if:      You have severe pain that does not improve with pain medication    You have chills or a fever greater than 101 F (38 C)    The site is red, swollen, hot or tender    New problems with your bowel or bladder    Any questions or concerns    Other Instructions:      New numbness down your leg post injection is temporary and may last for up to 6 hours. You may need assistance with activity until your leg has normal sensation.    If you are diabetic, monitor your blood sugar closely. Contact the provider who manages your diabetes to help you control your blood sugar if needed.    For Your Information:      A steroid was injected to help decrease swelling and may help to reduce pain. It may take up to 7-10 days to obtain full results.    Some patients will get lasting relief from a single injection. Others may require up to 3 injections to get results. If  you have more than one steroid injection, they should be given 2 weeks apart.    Side effects of your steroid injection are mild and will go away in 2-3 days  - Insomnia  - Heartburn  - Flushed face  - Water retention  - Increased appetite  - Increased blood sugar      If you have questions call:        [facility] @ [phone]

## 2020-08-03 NOTE — PROCEDURES
[facility]    Procedure: Right L5-S1 TFESI    Date/Time: 8/3/2020 2:19 PM  Performed by: Shahla Tejeda PA-C  Authorized by: Shahla Tejeda PA-C     UNIVERSAL PROTOCOL   Site Marked: Yes  Prior Images Obtained and Reviewed:  Yes  Required items: Required blood products, implants, devices and special equipment available    Patient identity confirmed:  Verbally with patient  NA - No sedation, light sedation, or local anesthesia  Confirmation Checklist:  Patient's identity using two indicators, relevant allergies, procedure was appropriate and matched the consent or emergent situation and correct equipment/implants were available  Time out: Immediately prior to the procedure a time out was called    Universal Protocol: the Joint Commission Universal Protocol was followed    Preparation: Patient was prepped and draped in usual sterile fashion           ANESTHESIA    Anesthesia: Local infiltration  Local Anesthetic:  Lidocaine 1% without epinephrine      SEDATION    Patient Sedated: No    See dictated procedure note for full details.  PROCEDURE   Patient Tolerance:  Patient tolerated the procedure well with no immediate complications  Describe Procedure: Interlaminar approach requested but precluded by prior surgery. Patient had good success with prior right L5-S1 TFESI. This was short lived, but she states things were likely reaggravated by a fall. Discussed options of repeat right L5-S1 TFESI vs caudal. Patient requests to proceed with repeat TF approach.   Length of time physician/provider present for 1:1 monitoring during sedation: 0

## 2020-08-03 NOTE — PROGRESS NOTES
PATIENT/VISITOR WELLNESS SCREENING    Step 1 Patient Screening    1. In the last month, have you been in contact with someone who was confirmed or suspected to have Coronavirus/COVID-19? No    2. Do you have the following symptoms?  Fever/Chills? No   Cough? No   Shortness of breath? No   New loss of taste or smell? No  Sore throat? No  Muscle or body aches? No  Headaches? No  Fatigue? No  Vomiting or diarrhea? No    Step 2 Visitor Screening    1. Name of Visitor (1 visitor per patient): Sister Nghia    2. In the last month, have you been in contact with someone who was confirmed or suspected to have Coronavirus/COVID-19? No    3. Do you have the following symptoms?  Fever/Chills? No   Cough? No   Shortness of breath? No   Skin rash? No   Loss of taste or smell? No  Sore throat? No  Runny or stuffy nose? No  Muscle or body aches? No  Headaches? No  Fatigue? No  Vomiting or diarrhea? No    If the visitor has positive symptoms, notify supervisor/manger  Per policy, the visitor will need to leave the facility     Step 3 Refer to logic grid below for actions    NO SYMPTOM(S)    ACTIONS:  1. Standard rooming process  2. Provider to assess per normal protocol  3. Implement precautions as needed and per guidelines     POSITIVE SYMPTOM(S)  If positive for ANY of the following symptoms: fever, cough, shortness of breath, rash    ACTION:  1. Continue to have the patient wear a mask   2. Room patient as soon as possible  3. Don appropriate PPE when entering room  4. Provider evaluation

## 2020-08-03 NOTE — PROGRESS NOTES
Care Suites Admission Nursing Note    Patient Information  Name: Betty Tee  Age: 80 year old  Reason for admission: Lumbar epidural steroid injection.  Care Suites arrival time: 1230    Patient Admission/Assessment   Pre-procedure assessment complete: Yes  If abnormal assessment/labs, provider notified: N/A  NPO: N/A  Medications held per instructions/orders: Yes  Consent: deferred  If applicable, pregnancy test status: deferred  Patient oriented to room: Yes  Education/questions answered: Yes  Plan/other: Proceed as scheduled.    Discharge Planning  Accompanied by: Sister Yvette  Discharge name/phone number: Sister Nghia  [phone]  Overnight post sedation caregiver: NIGHAT  Discharge location: home    Shona Arroyo RN

## 2020-08-03 NOTE — PROGRESS NOTES
[facility] Post Procedure Note    Patient Information  Name: Betty Tee  Age: 80 year old    Post Procedure  Time patient returned to Care [facility]: [id number]  Concerns/abnormal assessment: Patient tearful as procedure was very uncomfortable for her.  Listened and reassurance provided.  States no pain in right leg/foot now.  States feels numb.  Still has right groin pain and rates it 5/10 with rest and 8/10 with activity.  Epidural injection site covered with bandaid.  Site CDI, no swelling.  If abnormal assessment, provider notified: N/A  Plan/Other: Recover per post procedure orders.    Shona Arroyo RN

## 2020-08-20 RX ORDER — HYDROCODONE BITARTRATE AND ACETAMINOPHEN 5; 325 MG/1; MG/1
TABLET ORAL
Refills: 0 | Status: CANCELLED | OUTPATIENT
Start: 2020-08-20 | End: 2020-08-23

## 2020-08-20 NOTE — TELEPHONE ENCOUNTER
HYDROcodone-acetaminophen (NORCO) 5-325 MG tablet   NOT ON MEDICATION LIST    Last Office Visit : 7-1-2020  Future Office visit:  9-9-2020    Routing refill request to provider for review/approval because:  Controlled medication.

## 2020-08-21 ENCOUNTER — TELEPHONE (OUTPATIENT)
Dept: ORTHOPEDICS | Facility: CLINIC | Age: 80
End: 2020-08-21

## 2020-08-21 DIAGNOSIS — M25.551 HIP PAIN, RIGHT: Primary | ICD-10-CM

## 2020-08-21 NOTE — TELEPHONE ENCOUNTER
"Called and spoke with patient on the phone. She notes that the recent R L5-S1 TFESI was excruciating and did nothing to help relieve her right leg symptoms. She is not interested in proceeding with any other injection in the future as a result of the bad experience she had with this injection.    She reports her main concern is severe pain in right groin. Pain is worse when she stands up and starts walking. Improves slowly after moving around a bit. She also has pain radiating down her right leg in L5 distribution and numbness in right foot, but this is less bothersome to her. She says she is able to bear weight on the right leg, but she has been limping.    I explained that it sounds like her groin pain is more likely from her right hip OA rather than the L5-S1 level. It sounds like the pain going down back of her leg and foot numbness is likely from the L5-S1 level, but she says that this is not her main concern. She says she has had this severe right groin pain since she fell a little before March 2020. I reviewed XR right hip from 3/4/2020 (after her fall) which demonstrates bilateral OA, right > left, without evidence of acute fracture. She is very concerned she has a \"hairline fracture\" in right hip, so would like an MRI to confirm/ deny this. I will order this for her today. If MRI comes back with evidence of fracture, will discuss with hip orthopedic surgeons on how best to manage. If MRI comes back negative for fracture, would recommend PT and local (+/- steroid) injection of right hip for diagnostic (and therapeutic if patient desires, but had spike in blood sugars last time) purposes. Patient is agreeable with this plan.    Will call with MRI results.    Celsa Gilmore PA-C    "

## 2020-08-21 NOTE — TELEPHONE ENCOUNTER
[facility] Call Center    Phone Message    May a detailed message be left on voicemail: yes     Reason for Call: Other: Pt would like a call back to discuss her spine injection as it was painful and did not work and she would like call back to get an order for xray or MRI or what can be done  or have Celsa reach out     Action Taken: Message routed to:  [facility] ([facility] Ortho    Travel Screening: Not Applicable

## 2020-08-25 ENCOUNTER — TELEPHONE (OUTPATIENT)
Dept: RHEUMATOLOGY | Facility: CLINIC | Age: 80
End: 2020-08-25

## 2020-08-25 NOTE — TELEPHONE ENCOUNTER
Spoke with patient who stated that she was using the hydrocodone instead of the tylenol as she is experiencing more pain than usual. Patient stated that yesterday her pain level was at a 6 or 7 but a little better today at about a 2.     Patient states that she wants to hold off on the refill as she is just using tylenol now.     Routing to provider.    Rosalina Coffman CMA   8/25/2020 11:10 AM

## 2020-08-26 ENCOUNTER — VIRTUAL VISIT (OUTPATIENT)
Dept: FAMILY MEDICINE | Facility: CLINIC | Age: 80
End: 2020-08-26
Payer: MEDICARE

## 2020-08-26 DIAGNOSIS — R10.31 RIGHT GROIN PAIN: ICD-10-CM

## 2020-08-26 DIAGNOSIS — I50.30 HEART FAILURE WITH PRESERVED EJECTION FRACTION, UNSPECIFIED HF CHRONICITY (H): ICD-10-CM

## 2020-08-26 DIAGNOSIS — E11.65 TYPE 2 DIABETES MELLITUS WITH HYPERGLYCEMIA, WITH LONG-TERM CURRENT USE OF INSULIN (H): Primary | ICD-10-CM

## 2020-08-26 DIAGNOSIS — E78.5 HYPERLIPIDEMIA LDL GOAL <100: ICD-10-CM

## 2020-08-26 DIAGNOSIS — F33.1 MAJOR DEPRESSIVE DISORDER, RECURRENT EPISODE, MODERATE (H): ICD-10-CM

## 2020-08-26 DIAGNOSIS — Z79.4 TYPE 2 DIABETES MELLITUS WITH HYPERGLYCEMIA, WITH LONG-TERM CURRENT USE OF INSULIN (H): Primary | ICD-10-CM

## 2020-08-26 DIAGNOSIS — I10 ESSENTIAL HYPERTENSION WITH GOAL BLOOD PRESSURE LESS THAN 140/90: ICD-10-CM

## 2020-08-26 PROCEDURE — 99214 OFFICE O/P EST MOD 30 MIN: CPT | Mod: 95 | Performed by: FAMILY MEDICINE

## 2020-08-26 NOTE — PROGRESS NOTES
"Betty Tee is a 80 year old female who is being evaluated via a billable video visit.      The patient has been notified of following:     \"This video visit will be conducted via a call between you and your physician/provider. We have found that certain health care needs can be provided without the need for an in-person physical exam.  This service lets us provide the care you need with a video conversation.  If a prescription is necessary we can send it directly to your pharmacy.  If lab work is needed we can place an order for that and you can then stop by our lab to have the test done at a later time.    Video visits are billed at different rates depending on your insurance coverage.  Please reach out to your insurance provider with any questions.    If during the course of the call the physician/provider feels a video visit is not appropriate, you will not be charged for this service.\"    Patient has given verbal consent for Video visit? Yes  How would you like to obtain your AVS? MyChart  If you are dropped from the video visit, the video invite should be resent to: Text to cell phone: [phone]  Will anyone else be joining your video visit? No    Subjective     Betty Tee is a 80 year old female who presents today via video visit for the following health issues:    HPI    Diabetes Follow-up    How often are you checking your blood sugar? Three times daily  Blood sugar testing frequency justification:  Uncontrolled diabetes  What time of day are you checking your blood sugars (select all that apply)?  Before meals  Have you had any blood sugars above 200?  Yes   Have you had any blood sugars below 70?  No    What symptoms do you notice when your blood sugar is low?  None    What concerns do you have today about your diabetes? None and Blood sugar is often over 200     Do you have any of these symptoms? (Select all that apply)  Numbness in feet    Last 2-3 wks, glucose >200, sometimes over " 300.  Today 154, 170.    Basaglar- 22,   Ozempic 0.5mg  Novolog at 6 units with 3 meals-- discussed that she could do 6-8 units    Eating more fruit, not sick.    Lipids-   LDL was 1 at last check in 1/20.  Lowered lipitor to 5mg/d from 10mg/d.  Will try and check fasting lipids at next lab draw.    Sees cardiology tomorrow.  Labs at 2:30pm, echo at 3pm, and Dr. Rosa at 3:30p.    K  Last lab was good at 4.0  Same dose    Pain- it's there.  Saw ortho   8/3/20- horribly painful, didn't help.  Getting f/u MRI on 9/5/20  Pain is the R groin area.    Prednisone- going down on the dose- Dr. Lopez  Goal is to get 5mg/d        BP Readings from Last 2 Encounters:   08/03/20 129/63   03/04/20 106/73     Hemoglobin A1C (%)   Date Value   06/19/2020 6.8 (H)   01/21/2020 7.0 (H)     LDL Cholesterol Calculated (mg/dL)   Date Value   01/21/2020 1   04/09/2019 29         How many servings of fruits and vegetables do you eat daily?  2-3    On average, how many sweetened beverages do you drink each day (Examples: soda, juice, sweet tea, etc.  Do NOT count diet or artificially sweetened beverages)?   0    How many days per week do you exercise enough to make your heart beat faster? 3 or less    How many minutes a day do you exercise enough to make your heart beat faster? 30 - 60    How many days per week do you miss taking your medication? 0       Review of Systems   Constitutional, HEENT, cardiovascular, pulmonary, gi and gu systems are negative, except as otherwise noted.      Objective       Vitals:  No vitals were obtained today due to virtual visit.    Physical Exam     GENERAL: Healthy, alert and no distress  EYES: Eyes grossly normal to inspection.  No discharge or erythema, or obvious scleral/conjunctival abnormalities.  RESP: No audible wheeze, cough, or visible cyanosis.  No visible retractions or increased work of breathing.    SKIN: Visible skin clear. No significant rash, abnormal pigmentation or lesions.  NEURO:  "Cranial nerves grossly intact.  Mentation and speech appropriate for age.  PSYCH: Mentation appears normal, affect normal/bright, judgement and insight intact, normal speech and appearance well-groomed.            Assessment & Plan       ICD-10-CM    1. Type 2 diabetes mellitus with hyperglycemia, with long-term current use of insulin (H)  E11.65     Z79.4    2. Essential hypertension with goal blood pressure less than 140/90  I10    3. Hyperlipidemia LDL goal <100  E78.5    4. Heart failure with preserved ejection fraction, unspecified HF chronicity (H)  I50.30    5. Right groin pain  R10.31    6. Major depressive disorder, recurrent episode, moderate (H)  F33.1        DM II- 6/20 A1C improved from 7.0 to 6.8.  Meds/med list UTD.  2-3 wks of glucose elevations.  Discussed she could increase her novolog insulin from 6 to 8 units if she's seeing higher levels, and back down if/when they normalize.  Cont rest of meds at the same level.    HTN- last BP looked good, no se's.    Lipids- last LDL very low at '1' in 1/20, so lipitor lowered to 5mg/d.  No lipid recheck since then.  Will have her come fasting to next appt.    Cardiology- has tests and appt tomorrow with Dr. Rosa.    Rheum- prednisone tapering with Dr. John LAUREANO groin pain- still very bad.  Seeing Dr. See, but recent injection very painful and not helpful.  Getting hip MRI through them next week.  Discussed continued f/u with them, but if they run out of options, may then consider rec f/u with Dr. Moore at  Pain clinic.       BMI:   Estimated body mass index is 32.44 kg/m  as calculated from the following:    Height as of 8/27/20: 1.676 m (5' 6\").    Weight as of 8/27/20: 91.2 kg (201 lb).   Weight management plan: Discussed healthy diet and exercise guidelines        Return in about 2 months (around 10/26/2020) for Diabetes, Fasting labs at appointment.    Ilene Tristan MD  [facility]        Video-Visit Details    Type of " service:  Video Visit - converted to phone visit- unable to connect via [employer] nor [employer]    Video Start Time: 3:16 PM  Video End Time:3:36 PM    Originating Location (pt. Location): Home    Distant Location (provider location):  [facility]     Platform used for Video Visit: [employer] but unable to connect, so converted to phone via [employer]

## 2020-08-26 NOTE — NURSING NOTE
"Chief Complaint   Patient presents with     Video Visit     There were no vitals taken for this visit. Estimated body mass index is 32.12 kg/m  as calculated from the following:    Height as of 6/19/20: 1.676 m (5' 6\").    Weight as of 6/19/20: 90.3 kg (199 lb).  Medication Reconciliation: complete        Health Maintenance Due Pending Provider Review:  Medicare Physical due    Pt will schedule Medicare Physical appt in future.    Jacki Bai MA  [facility]  [phone]  "

## 2020-08-27 ENCOUNTER — OFFICE VISIT (OUTPATIENT)
Dept: CARDIOLOGY | Facility: CLINIC | Age: 80
End: 2020-08-27
Attending: INTERNAL MEDICINE
Payer: MEDICARE

## 2020-08-27 VITALS
DIASTOLIC BLOOD PRESSURE: 76 MMHG | HEIGHT: 66 IN | SYSTOLIC BLOOD PRESSURE: 118 MMHG | BODY MASS INDEX: 32.3 KG/M2 | OXYGEN SATURATION: 96 % | WEIGHT: 201 LBS | HEART RATE: 63 BPM

## 2020-08-27 DIAGNOSIS — I50.30 (HFPEF) HEART FAILURE WITH PRESERVED EJECTION FRACTION (H): ICD-10-CM

## 2020-08-27 DIAGNOSIS — I50.32 CHRONIC HEART FAILURE WITH PRESERVED EJECTION FRACTION (H): ICD-10-CM

## 2020-08-27 DIAGNOSIS — I50.32 CHRONIC DIASTOLIC CONGESTIVE HEART FAILURE (H): ICD-10-CM

## 2020-08-27 LAB
ANION GAP SERPL CALCULATED.3IONS-SCNC: 7 MMOL/L (ref 3–14)
BUN SERPL-MCNC: 18 MG/DL (ref 7–30)
CALCIUM SERPL-MCNC: 9.1 MG/DL (ref 8.5–10.1)
CHLORIDE SERPL-SCNC: 104 MMOL/L (ref 94–109)
CO2 SERPL-SCNC: 26 MMOL/L (ref 20–32)
CREAT SERPL-MCNC: 0.94 MG/DL (ref 0.52–1.04)
GFR SERPL CREATININE-BSD FRML MDRD: 58 ML/MIN/{1.73_M2}
GLUCOSE SERPL-MCNC: 268 MG/DL (ref 70–99)
NT-PROBNP SERPL-MCNC: 176 PG/ML (ref 0–450)
POTASSIUM SERPL-SCNC: 4.4 MMOL/L (ref 3.4–5.3)
SODIUM SERPL-SCNC: 137 MMOL/L (ref 133–144)

## 2020-08-27 PROCEDURE — 36415 COLL VENOUS BLD VENIPUNCTURE: CPT | Performed by: INTERNAL MEDICINE

## 2020-08-27 PROCEDURE — 80048 BASIC METABOLIC PNL TOTAL CA: CPT | Performed by: INTERNAL MEDICINE

## 2020-08-27 PROCEDURE — 83880 ASSAY OF NATRIURETIC PEPTIDE: CPT | Performed by: INTERNAL MEDICINE

## 2020-08-27 PROCEDURE — G0463 HOSPITAL OUTPT CLINIC VISIT: HCPCS | Mod: ZF

## 2020-08-27 PROCEDURE — 99214 OFFICE O/P EST MOD 30 MIN: CPT | Mod: 25 | Performed by: INTERNAL MEDICINE

## 2020-08-27 ASSESSMENT — MIFFLIN-ST. JEOR: SCORE: 1398.48

## 2020-08-27 ASSESSMENT — PAIN SCALES - GENERAL: PAINLEVEL: NO PAIN (0)

## 2020-08-27 NOTE — NURSING NOTE
Chief Complaint   Patient presents with     Follow Up      HFpEF, labs and echo prior   Snoring:   Do you snore loudly? No  Tiredness:   Do you often feel tired, Fatigued,or sleepy during daytime? Yes  Observed Apnea:   Has anyone observed that you stop breathing, or choke, Gasp during your sleep? No  High Blood Pressure:   DO you have or are being treated for HBP? Yes    BMI:   Is your body mass index more than 35 kg per M2? No  Age:   Are you older than 50 years old? Yes  Neck Circumference:   Is you neck circumference greater than 40 cm( 15.75 inches)? No 39cm / 15 inches  Gender:   Are you male? No      How may Yes responses: 3      Score 1 Point for each positive response.   Scoring interpretation: 0 to 2=Low Risk , 3 to 4 Intermediate Risk,  >5= High Risk            Vitals were taken and medications were reconciled.     Celsa Salter  4:06 PM

## 2020-08-27 NOTE — NURSING NOTE
Diet: Patient instructed regarding a heart failure healthy diet, including discussion of reduced fat and 2000 mg daily sodium restriction, daily weights, medication purpose and compliance, fluid restrictions and resources for patient and family to access for assistance with heart failure management.       Labs: Patient was given results of the laboratory testing obtained today and patient was instructed about when to return for the next laboratory testing.     Med Reconcile: Reviewed and verified all current medications with the patient. The updated medication list was printed and given to the patient.     Med changes: no changes    Return Appointment: Patient given instructions regarding scheduling next clinic visit: 1 year with echo and labs    Patient stated she understood all health information given and agreed to call with further questions or concerns.     Flor Velasquez RN

## 2020-08-27 NOTE — PROGRESS NOTES
August 27, 2020    Follow up HFpEF    HPI:   79 yo F with interstitial lung disease (moderate restriction), Sjogren's syndrome, HTN, atrial fibrillation, diverticulitis s/p colectomy 2011 who I am following for HFpEF. She is here for routine HFpEF follow up.     Regarding her heart, she is doing well.  She is having some hip issues, requiring joint injections and MRI for evaluation.  She does feel more fatigued at the end of the day predominantly, but it may be related to not wearing her oxygen, which she is prescribed 4L.  She does not have lightheadedness, syncope, chest pain, PND, orthopnea, or lower extremity edema.  She does wear her CPAP mask routinely.  Breathing is stable at rest.    PAST MEDICAL HISTORY:  Past Medical History:   Diagnosis Date     Alcohol abuse, in remission      Allergic rhinitis, cause unspecified     allegra helps when she takes it     Antiplatelet or antithrombotic long-term use      Atrial fibrillation (H)     in hosp in 11/11 after surgery w/ fluid overload     Cardiomegaly     LVH on stress echo- cardiac w/u at [facility] ER- neg CT scan for PE, neg stress echo in 8/06     Chest pain, unspecified      Disorder of bone and cartilage, unspecified     osteopenia (had been on prempro), improved on 6/06 dexa, stable dexa 11/10     Diverticulosis of colon (without mention of hemorrhage)     last episode yrs ago     Essential hypertension, benign      Follicular bronchiolitis (H)     associated with Sjogrens, dx by chest CT showing mosaic attenuation and air trapping     Gastro-oesophageal reflux disease      ILD (interstitial lung disease) (H)     associated with Sjogrens, also has mildly elevated IgG4, first noted on chest CT 2015 (mild changes) and also has small airways disease; ILD improved on follow up chest CT 2018.     Insomnia, unspecified     weaned off clonazepam     Irregular heart beat      Lumbago 7/09    MRI with NEAL, now seeing Dr. Cain for sciatic sx's     Major depressive  disorder, recurrent episode, moderate (H)      Obstructive sleep apnea     uses cpap     Osteoarthrosis, unspecified whether generalized or localized, unspecified site      Sjogren's syndrome (H)     + RG and SSA and lip bx     Sleep apnea      Tobacco use disorder     chantix in 9/07, started again in 6/08, working     FAMILY HISTORY:  Mother had MI and CHF in her 60's. 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    CURRENT MEDICATIONS:  Current Outpatient Medications   Medication Sig Dispense Refill     acetaminophen (TYLENOL) 500 MG tablet Take 1,000 mg by mouth every 8 hours as needed (max 6 tablets/24 hours, 2 tablets/dose)        acyclovir (ZOVIRAX) 5 % external ointment Apply topically 6 times daily As needed for outbreaks 15 g 3     albuterol (PROAIR HFA, PROVENTIL HFA, VENTOLIN HFA) 108 (90 BASE) MCG/ACT inhaler Inhale 2 puffs into the lungs every 6 hours 1 Inhaler 3     alendronate (FOSAMAX) 70 MG tablet Take 1 tablet (70 mg) by mouth every 7 days 12 tablet 1     atorvastatin (LIPITOR) 10 MG tablet TAKE 1/2 TABLET BY MOUTH EVERY DAY 45 tablet 0     azithromycin (ZITHROMAX) 250 MG tablet Take 1 tablet (250 mg) by mouth daily 30 tablet 11     CEVIMELINE 30 MG PO capsule TAKE 1 CAPSULE (30 MG) BY MOUTH 3 TIMES DAILY (Patient taking differently: Take 30 mg by mouth every other day ) 270 capsule 2     escitalopram (LEXAPRO) 20 MG tablet TAKE 1 TABLET BY MOUTH EVERY DAY 90 tablet 0     fluticasone (FLONASE) 50 MCG/ACT nasal spray Spray 1-2 sprays into both nostrils daily as needed for allergies 18.2 mL 11     fluticasone-vilanterol (BREO ELLIPTA) 100-25 MCG/INH inhaler Inhale 1 puff into the lungs daily 1 Inhaler 11     gabapentin (NEURONTIN) 300 MG capsule Take 1 capsule (300 mg) by mouth 3 times daily 90 capsule 0     hydroxychloroquine (PLAQUENIL) 200 MG tablet TAKE 2 TABLETS (400 MG) BY MOUTH DAILY ANNUAL PLAQUENIL TOXICITY  EYE SCREENING REQUIRED. 180 tablet 1     insulin glargine (BASAGLAR KWIKPEN) 100 UNIT/ML pen INJECT 22 UNITS SUBCUTANEOUS DAILY (TO REPLACE LANTUS) 15 mL 0     lidocaine (LIDODERM) 5 % patch Apply patch to painful area for up to 12 h within a 24 h period.  Remove after 12 hours. 30 patch 5     loratadine (CLARITIN) 10 MG tablet TAKE 1 TABLET BY MOUTH EVERY DAY 90 tablet 1     metoprolol succinate ER (TOPROL-XL) 25 MG 24 hr tablet Take 0.5 tablets (12.5 mg) by mouth 2 times daily Take 50 mg by mouth in combination with 12.5 for a total of 62.5 twice a day 90 tablet 3     metoprolol succinate ER (TOPROL-XL) 50 MG 24 hr tablet Take 50 mg by mouth in combination with 12.5 for a total of 62.5 twice a day 180 tablet 3     montelukast (SINGULAIR) 10 MG tablet TAKE 1 TABLET BY MOUTH EVERYDAY AT BEDTIME 30 tablet 5     NOVOLOG FLEXPEN 100 UNIT/ML soln INJECT 12 UNITS SUBCUTANEOUS 3 TIMES DAILY (WITH MEALS) (Patient taking differently: Inject 6 Units Subcutaneous 3 times daily (with meals) ) 15 mL 1     OZEMPIC, 0.25 OR 0.5 MG/DOSE, 2 MG/1.5ML SOPN INJECT 0.5 MG SUBCUTANEOUS ONCE A WEEK 4.5 mL 0     potassium chloride ER (KLOR-CON) 20 MEQ CR tablet Take 2 tablets (40 mEq) by mouth every morning AND 1 tablet (20 mEq) every evening. 270 tablet 3     predniSONE (DELTASONE) 1 MG tablet 15-12.5-10-9-8-7-6 mg a day each for 2 weeks then 5 mg a day, use with 5 mg size tab 90 tablet 2     rivaroxaban ANTICOAGULANT (XARELTO ANTICOAGULANT) 20 MG PO TABS tablet Take 1 tablet (20 mg) by mouth daily (with dinner) 90 tablet 2     spironolactone (ALDACTONE) 25 MG tablet Take 2 tablets (50 mg) by mouth daily 180 tablet 3     torsemide (DEMADEX) 10 MG tablet TAKE ONE TAB (10 MG) WITH ONE 20 MG TAB TO = 30 MG DAILY IN AFTERNOON. 90 tablet 3     torsemide (DEMADEX) 20 MG tablet TAKE 2 TABS (40 MG) IN AM DAILY AND TAKE 1 TAB BY MOUTH IN THE AFTERNOON ALONG W/ A 10 MG  tablet 4     traZODone (DESYREL) 50 MG tablet TAKE 0.5-1.5 TABLETS  "(25-75 MG) BY MOUTH NIGHTLY AS NEEDED FOR SLEEP 135 tablet 0     vitamin D3 (CHOLECALCIFEROL) 2000 units (50 mcg) tablet Take 1 tablet (2,000 Units) by mouth daily 90 tablet 3       ROS:   Constitutional: No fever, chills. No weight gain/loss.   ENT: eye hematoma ear ache, epistaxis, sore throat.   Allergies/Immunologic: Negative.   Respiratory: + intermittent cough, no hemoptysis.   Cardiovascular: As per HPI.   GI: No nausea, vomiting, hematemesis, melena, or hematochezia.   : No urinary frequency, dysuria, or hematuria.   Integument: Negative.   Psychiatric: Negative.   Neuro: Negative.   Endocrinology: Negative.   Musculoskeletal: hip pain    EXAM:  /76 (BP Location: Right arm, Patient Position: Chair, Cuff Size: Adult Regular)   Pulse 63   Ht 1.676 m (5' 6\")   Wt 91.2 kg (201 lb)   SpO2 96%   BMI 32.44 kg/m       General: appears comfortable, alert and articulate  Head: normocephalic, atraumatic  Neck: no adenopathy  Orophyarynx: moist mucosa, no lesions, dentition intact  Heart: regular, S1/S2, no murmur, gallop, rub, estimated JVP <10   Lungs: diffuse expiratory wheezing, no crackles,no dullness   Abdomen: soft, non-tender, bowel sounds present, no hepatosplenomegaly  Extremities: trace LE edema, no clubbing, cyanosis.- right wrist wrapped   Neurological: normal speech and affect, no gross motor deficits    Labs:  CBC RESULTS:  Lab Results   Component Value Date    WBC 9.7 06/19/2020    RBC 3.95 06/19/2020    HGB 11.9 06/19/2020    HCT 37.8 06/19/2020    MCV 96 06/19/2020    MCH 30.1 06/19/2020    MCHC 31.5 06/19/2020    RDW 14.6 06/19/2020     06/19/2020       CMP RESULTS:  Lab Results   Component Value Date     08/27/2020    POTASSIUM 4.4 08/27/2020    CHLORIDE 104 08/27/2020    CO2 26 08/27/2020    ANIONGAP 7 08/27/2020     (H) 08/27/2020    BUN 18 08/27/2020    CR 0.94 08/27/2020    GFRESTIMATED 58 (L) 08/27/2020    GFRESTBLACK 67 08/27/2020    CLAUDY 9.1 08/27/2020    " BILITOTAL 1.1 03/03/2020    ALBUMIN 3.2 (L) 06/19/2020    ALKPHOS 82 03/03/2020    ALT 12 06/19/2020    AST 10 06/19/2020        INR RESULTS:  Lab Results   Component Value Date    INR 1.36 (H) 03/03/2020       Lab Results   Component Value Date    MAG 2.0 04/11/2017     Lab Results   Component Value Date    NTBNPI 3,531 (H) 04/06/2017     Lab Results   Component Value Date    NTBNP 176 08/27/2020     HgA1c 7%     Echocardiogram: 8/27/20   Interpretation Summary  Global and regional left ventricular function is normal with an EF of 60-65%.  Global right ventricular size and function are normal.  Trileaflet aortic sclerosis without stenosis.  Pulmonary artery systolic pressure cannot be assessed.  IVC diameter <2.1 cm collapsing >50% with sniff suggests a normal RA pressure of 3 mmHg.     No significant change from prior echocardiograms.    Regadenosen MPI 2014: no fixed or inducible defects    Assessment and Plan:  In summary this is a very pleasant 80 year old female with suspected HFpEF who is here for HFpEF follow up.     In support of the diagnosis of HFpEF includes mild LA enlargement, echocardiographic signs of increase LV filling pressures, increased nt-bnp, as well as a diuretic requirement and symptomatic improvement on diuretics.    The risk factors for HFpEF in her include age, gender, HTN, diabetes, sleep apnea and obesity.  A prior stress test was negative for CAD. She is presently euvolemic on exam and is NYHA class III (mutifactorial).    The mainstays of therapy for HFpEF include volume management, blood pressure control, treating underlying sleep apnea if present, weight management, exercise training, rate control for atrial fibrillation as well as consideration for a rhythm control strategy, as well as consideration for clinical trials.  I do have her on spironolactone given the results of the TOPCAT trial. Patients have symptomatically felt very improved on this medication. Her a fib rates have  been under better control recently and her ischemia evaluation is negative.     Plan for HFpEF: continue current dose of diuretics, BP is controlled, volume status is euvolemic     Other:   Hypertension: controlled    Atrial fibrillation [id number] (age >75, HTN, CHF, gender) - rates controlled on metoprolol , on a xarelto.  She is actually sinus right now, by exam and on the echocardiogram.      Primary prevention: on statin, no ASA given she is on anti-coagulation     Follow up in 1 year with repeat echo.    Patient seen and discussed with attending Dr. Moe Moreno MD PGY7  Advanced Heart Failure Cardiology Fellow      I have seen and examined the patient with the house staff on August 27, 2020  and agree with the outlined assessment and plan.      Radha Rosa MD  Associate Professor of Medicine   [facility] Division of Cardiology

## 2020-08-27 NOTE — LETTER
8/27/2020      RE: Betty Tee  [address]  [facility] [address]       Dear Colleague,    Thank you for the opportunity to participate in the care of your patient, Betty Tee, at the [facility]. Please see a copy of my visit note below.    August 27, 2020    Follow up HFpEF    HPI:   81 yo F with interstitial lung disease (moderate restriction), Sjogren's syndrome, HTN, atrial fibrillation, diverticulitis s/p colectomy 2011 who I am following for HFpEF. She is here for routine HFpEF follow up.     Regarding her heart, she is doing well.  She is having some hip issues, requiring joint injections and MRI for evaluation.  She does feel more fatigued at the end of the day predominantly, but it may be related to not wearing her oxygen, which she is prescribed 4L.  She does not have lightheadedness, syncope, chest pain, PND, orthopnea, or lower extremity edema.  She does wear her CPAP mask routinely.  Breathing is stable at rest.    PAST MEDICAL HISTORY:  Past Medical History:   Diagnosis Date     Alcohol abuse, in remission      Allergic rhinitis, cause unspecified     allegra helps when she takes it     Antiplatelet or antithrombotic long-term use      Atrial fibrillation (H)     in hosp in 11/11 after surgery w/ fluid overload     Cardiomegaly     LVH on stress echo- cardiac w/u at [facility]- neg CT scan for PE, neg stress echo in 8/06     Chest pain, unspecified      Disorder of bone and cartilage, unspecified     osteopenia (had been on prempro), improved on 6/06 dexa, stable dexa 11/10     Diverticulosis of colon (without mention of hemorrhage)     last episode yrs ago     Essential hypertension, benign      Follicular bronchiolitis (H)     associated with Sjogrens, dx by chest CT showing mosaic attenuation and air trapping     Gastro-oesophageal reflux disease      ILD (interstitial lung disease) (H)     associated with Sjogrens,  also has mildly elevated IgG4, first noted on chest CT 2015 (mild changes) and also has small airways disease; ILD improved on follow up chest CT 2018.     Insomnia, unspecified     weaned off clonazepam     Irregular heart beat      Lumbago 7/09    MRI with NEAL, now seeing Dr. Cain for sciatic sx's     Major depressive disorder, recurrent episode, moderate (H)      Obstructive sleep apnea     uses cpap     Osteoarthrosis, unspecified whether generalized or localized, unspecified site      Sjogren's syndrome (H)     + RG and SSA and lip bx     Sleep apnea      Tobacco use disorder     chantix in 9/07, started again in 6/08, working     FAMILY HISTORY:  Mother had MI and CHF in her 60's. 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    CURRENT MEDICATIONS:  Current Outpatient Medications   Medication Sig Dispense Refill     acetaminophen (TYLENOL) 500 MG tablet Take 1,000 mg by mouth every 8 hours as needed (max 6 tablets/24 hours, 2 tablets/dose)        acyclovir (ZOVIRAX) 5 % external ointment Apply topically 6 times daily As needed for outbreaks 15 g 3     albuterol (PROAIR HFA, PROVENTIL HFA, VENTOLIN HFA) 108 (90 BASE) MCG/ACT inhaler Inhale 2 puffs into the lungs every 6 hours 1 Inhaler 3     alendronate (FOSAMAX) 70 MG tablet Take 1 tablet (70 mg) by mouth every 7 days 12 tablet 1     atorvastatin (LIPITOR) 10 MG tablet TAKE 1/2 TABLET BY MOUTH EVERY DAY 45 tablet 0     azithromycin (ZITHROMAX) 250 MG tablet Take 1 tablet (250 mg) by mouth daily 30 tablet 11     CEVIMELINE 30 MG PO capsule TAKE 1 CAPSULE (30 MG) BY MOUTH 3 TIMES DAILY (Patient taking differently: Take 30 mg by mouth every other day ) 270 capsule 2     escitalopram (LEXAPRO) 20 MG tablet TAKE 1 TABLET BY MOUTH EVERY DAY 90 tablet 0     fluticasone (FLONASE) 50 MCG/ACT nasal spray Spray 1-2 sprays into both nostrils daily as needed for allergies 18.2 mL 11      fluticasone-vilanterol (BREO ELLIPTA) 100-25 MCG/INH inhaler Inhale 1 puff into the lungs daily 1 Inhaler 11     gabapentin (NEURONTIN) 300 MG capsule Take 1 capsule (300 mg) by mouth 3 times daily 90 capsule 0     hydroxychloroquine (PLAQUENIL) 200 MG tablet TAKE 2 TABLETS (400 MG) BY MOUTH DAILY ANNUAL PLAQUENIL TOXICITY EYE SCREENING REQUIRED. 180 tablet 1     insulin glargine (BASAGLAR KWIKPEN) 100 UNIT/ML pen INJECT 22 UNITS SUBCUTANEOUS DAILY (TO REPLACE LANTUS) 15 mL 0     lidocaine (LIDODERM) 5 % patch Apply patch to painful area for up to 12 h within a 24 h period.  Remove after 12 hours. 30 patch 5     loratadine (CLARITIN) 10 MG tablet TAKE 1 TABLET BY MOUTH EVERY DAY 90 tablet 1     metoprolol succinate ER (TOPROL-XL) 25 MG 24 hr tablet Take 0.5 tablets (12.5 mg) by mouth 2 times daily Take 50 mg by mouth in combination with 12.5 for a total of 62.5 twice a day 90 tablet 3     metoprolol succinate ER (TOPROL-XL) 50 MG 24 hr tablet Take 50 mg by mouth in combination with 12.5 for a total of 62.5 twice a day 180 tablet 3     montelukast (SINGULAIR) 10 MG tablet TAKE 1 TABLET BY MOUTH EVERYDAY AT BEDTIME 30 tablet 5     NOVOLOG FLEXPEN 100 UNIT/ML soln INJECT 12 UNITS SUBCUTANEOUS 3 TIMES DAILY (WITH MEALS) (Patient taking differently: Inject 6 Units Subcutaneous 3 times daily (with meals) ) 15 mL 1     OZEMPIC, 0.25 OR 0.5 MG/DOSE, 2 MG/1.5ML SOPN INJECT 0.5 MG SUBCUTANEOUS ONCE A WEEK 4.5 mL 0     potassium chloride ER (KLOR-CON) 20 MEQ CR tablet Take 2 tablets (40 mEq) by mouth every morning AND 1 tablet (20 mEq) every evening. 270 tablet 3     predniSONE (DELTASONE) 1 MG tablet 15-12.5-10-9-8-7-6 mg a day each for 2 weeks then 5 mg a day, use with 5 mg size tab 90 tablet 2     rivaroxaban ANTICOAGULANT (XARELTO ANTICOAGULANT) 20 MG PO TABS tablet Take 1 tablet (20 mg) by mouth daily (with dinner) 90 tablet 2     spironolactone (ALDACTONE) 25 MG tablet Take 2 tablets (50 mg) by mouth daily 180 tablet 3  "    torsemide (DEMADEX) 10 MG tablet TAKE ONE TAB (10 MG) WITH ONE 20 MG TAB TO = 30 MG DAILY IN AFTERNOON. 90 tablet 3     torsemide (DEMADEX) 20 MG tablet TAKE 2 TABS (40 MG) IN AM DAILY AND TAKE 1 TAB BY MOUTH IN THE AFTERNOON ALONG W/ A 10 MG  tablet 4     traZODone (DESYREL) 50 MG tablet TAKE 0.5-1.5 TABLETS (25-75 MG) BY MOUTH NIGHTLY AS NEEDED FOR SLEEP 135 tablet 0     vitamin D3 (CHOLECALCIFEROL) 2000 units (50 mcg) tablet Take 1 tablet (2,000 Units) by mouth daily 90 tablet 3       ROS:   Constitutional: No fever, chills. No weight gain/loss.   ENT: eye hematoma ear ache, epistaxis, sore throat.   Allergies/Immunologic: Negative.   Respiratory: + intermittent cough, no hemoptysis.   Cardiovascular: As per HPI.   GI: No nausea, vomiting, hematemesis, melena, or hematochezia.   : No urinary frequency, dysuria, or hematuria.   Integument: Negative.   Psychiatric: Negative.   Neuro: Negative.   Endocrinology: Negative.   Musculoskeletal: hip pain    EXAM:  /76 (BP Location: Right arm, Patient Position: Chair, Cuff Size: Adult Regular)   Pulse 63   Ht 1.676 m (5' 6\")   Wt 91.2 kg (201 lb)   SpO2 96%   BMI 32.44 kg/m       General: appears comfortable, alert and articulate  Head: normocephalic, atraumatic  Neck: no adenopathy  Orophyarynx: moist mucosa, no lesions, dentition intact  Heart: regular, S1/S2, no murmur, gallop, rub, estimated JVP <10   Lungs: diffuse expiratory wheezing, no crackles,no dullness   Abdomen: soft, non-tender, bowel sounds present, no hepatosplenomegaly  Extremities: trace LE edema, no clubbing, cyanosis.- right wrist wrapped   Neurological: normal speech and affect, no gross motor deficits    Labs:  CBC RESULTS:  Lab Results   Component Value Date    WBC 9.7 06/19/2020    RBC 3.95 06/19/2020    HGB 11.9 06/19/2020    HCT 37.8 06/19/2020    MCV 96 06/19/2020    MCH 30.1 06/19/2020    MCHC 31.5 06/19/2020    RDW 14.6 06/19/2020     06/19/2020       CMP " RESULTS:  Lab Results   Component Value Date     08/27/2020    POTASSIUM 4.4 08/27/2020    CHLORIDE 104 08/27/2020    CO2 26 08/27/2020    ANIONGAP 7 08/27/2020     (H) 08/27/2020    BUN 18 08/27/2020    CR 0.94 08/27/2020    GFRESTIMATED 58 (L) 08/27/2020    GFRESTBLACK 67 08/27/2020    CLAUDY 9.1 08/27/2020    BILITOTAL 1.1 03/03/2020    ALBUMIN 3.2 (L) 06/19/2020    ALKPHOS 82 03/03/2020    ALT 12 06/19/2020    AST 10 06/19/2020        INR RESULTS:  Lab Results   Component Value Date    INR 1.36 (H) 03/03/2020       Lab Results   Component Value Date    MAG 2.0 04/11/2017     Lab Results   Component Value Date    NTBNPI 3,531 (H) 04/06/2017     Lab Results   Component Value Date    NTBNP 176 08/27/2020     HgA1c 7%     Echocardiogram: 8/27/20   Interpretation Summary  Global and regional left ventricular function is normal with an EF of 60-65%.  Global right ventricular size and function are normal.  Trileaflet aortic sclerosis without stenosis.  Pulmonary artery systolic pressure cannot be assessed.  IVC diameter <2.1 cm collapsing >50% with sniff suggests a normal RA pressure of 3 mmHg.     No significant change from prior echocardiograms.    Regadenosen MPI 2014: no fixed or inducible defects    Assessment and Plan:  In summary this is a very pleasant 80 year old female with suspected HFpEF who is here for HFpEF follow up.     In support of the diagnosis of HFpEF includes mild LA enlargement, echocardiographic signs of increase LV filling pressures, increased nt-bnp, as well as a diuretic requirement and symptomatic improvement on diuretics.    The risk factors for HFpEF in her include age, gender, HTN, diabetes, sleep apnea and obesity.  A prior stress test was negative for CAD. She is presently euvolemic on exam and is NYHA class III (mutifactorial).    The mainstays of therapy for HFpEF include volume management, blood pressure control, treating underlying sleep apnea if present, weight  management, exercise training, rate control for atrial fibrillation as well as consideration for a rhythm control strategy, as well as consideration for clinical trials.  I do have her on spironolactone given the results of the TOPCAT trial. Patients have symptomatically felt very improved on this medication. Her a fib rates have been under better control recently and her ischemia evaluation is negative.     Plan for HFpEF: continue current dose of diuretics, BP is controlled, volume status is euvolemic     Other:   Hypertension: controlled    Atrial fibrillation [id number] (age >75, HTN, CHF, gender) - rates controlled on metoprolol , on a xarelto.  She is actually sinus right now, by exam and on the echocardiogram.      Primary prevention: on statin, no ASA given she is on anti-coagulation     Follow up in 1 year with repeat echo.    Patient seen and discussed with attending Dr. Moe Moreno MD PGY7  Advanced Heart Failure Cardiology Fellow      I have seen and examined the patient with the house staff on August 27, 2020  and agree with the outlined assessment and plan.      Radha Rosa MD  Associate Professor of Medicine   [facility] Division of Cardiology         Please do not hesitate to contact me if you have any questions/concerns.     Sincerely,     Radha Rosa MD

## 2020-08-27 NOTE — PATIENT INSTRUCTIONS
"Cardiology Providers you saw during your visit:  Dr. Rosa    Medication changes:  1.  No changes    Follow up:  1.  Follow up with Dr Rosa in 1 year with an echo and labs  Labs:  Results for SISTER MARCIA KENNEDY (MRN [id number]) as of 8/27/2020 16:19   Ref. Range 8/27/2020 14:21   Sodium Latest Ref Range: 133 - 144 mmol/L 137   Potassium Latest Ref Range: 3.4 - 5.3 mmol/L 4.4   Chloride Latest Ref Range: 94 - 109 mmol/L 104   Carbon Dioxide Latest Ref Range: 20 - 32 mmol/L 26   Urea Nitrogen Latest Ref Range: 7 - 30 mg/dL 18   Creatinine Latest Ref Range: 0.52 - 1.04 mg/dL 0.94   GFR Estimate Latest Ref Range: >60 mL/min/1.73_m2 58 (L)   GFR Estimate If Black Latest Ref Range: >60 mL/min/1.73_m2 67   Calcium Latest Ref Range: 8.5 - 10.1 mg/dL 9.1   Anion Gap Latest Ref Range: 3 - 14 mmol/L 7   N-Terminal Pro Bnp Latest Ref Range: 0 - 450 pg/mL 176   Glucose Latest Ref Range: 70 - 99 mg/dL 268 (H)       Please call if you have :  1. Weight gain of more than 2 pounds in a day or 5 pounds in a week  2. Increased shortness of breath, swelling or bloating  3. Dizziness, lightheadedness   4. Any questions or concerns.       Follow the American Heart Association Diet and Lifestyle recommendations:  Limit saturated fat, trans fat, sodium, red meat, sweets and sugar-sweetened beverages. If you choose to eat red meat, compare labels and select the leanest cuts available.  Aim for at least 150 minutes of moderate physical activity or 75 minutes of vigorous physical activity - or an equal combination of both - each week.      During business hours: [phone], press option # 1 to be routed to the [employer] then option # 4 for \"To send a message to your care team\"      After hours, weekends or holidays: On Call Cardiologist- [phone]   option #4 and ask to speak to the on-call Cardiologist. Inform them you are a CORE/heart failure patient at the [employer].      Heart Failure Support Group  [employer] of " "[facility]  The Board Room, 8th Floor  [address] [address]     Meetings   1-2 pm  January 6  March 2  May 4  July 6  September 14  November 2      Flor Velasquez RN BSN CHFN  Cardiology Care Coordinator - Heart Failure/ [facility]  [facility]   Questions and [phone]   First press #1 for the [place] and then press #4 for \"To send a message to your care team\"    "

## 2020-08-31 RX ORDER — SPIRONOLACTONE 25 MG/1
50 TABLET ORAL DAILY
Qty: 180 TABLET | Refills: 3 | Status: SHIPPED | OUTPATIENT
Start: 2020-08-31 | End: 2021-09-20

## 2020-08-31 RX ORDER — POTASSIUM CHLORIDE 1500 MG/1
TABLET, EXTENDED RELEASE ORAL
Qty: 270 TABLET | Refills: 3 | Status: SHIPPED | OUTPATIENT
Start: 2020-08-31 | End: 2021-09-09

## 2020-09-05 ENCOUNTER — ANCILLARY PROCEDURE (OUTPATIENT)
Dept: MRI IMAGING | Facility: CLINIC | Age: 80
End: 2020-09-05
Attending: PHYSICIAN ASSISTANT
Payer: MEDICARE

## 2020-09-05 DIAGNOSIS — M25.551 HIP PAIN, RIGHT: ICD-10-CM

## 2020-09-09 ENCOUNTER — VIRTUAL VISIT (OUTPATIENT)
Dept: RHEUMATOLOGY | Facility: CLINIC | Age: 80
End: 2020-09-09
Attending: INTERNAL MEDICINE
Payer: MEDICARE

## 2020-09-09 DIAGNOSIS — M35.3 POLYMYALGIA RHEUMATICA (H): ICD-10-CM

## 2020-09-09 DIAGNOSIS — M16.11 OSTEOARTHRITIS OF RIGHT HIP, UNSPECIFIED OSTEOARTHRITIS TYPE: ICD-10-CM

## 2020-09-09 DIAGNOSIS — G89.29 OTHER CHRONIC PAIN: Primary | ICD-10-CM

## 2020-09-09 RX ORDER — HYDROCODONE BITARTRATE AND ACETAMINOPHEN 7.5; 325 MG/1; MG/1
1 TABLET ORAL EVERY 12 HOURS
Qty: 60 TABLET | Refills: 0 | Status: SHIPPED | OUTPATIENT
Start: 2020-09-09 | End: 2021-02-06

## 2020-09-09 ASSESSMENT — PAIN SCALES - GENERAL: PAINLEVEL: EXTREME PAIN (9)

## 2020-09-09 NOTE — PROGRESS NOTES
"Betty Tee is a 80 year old female who is being evaluated via a billable video visit.      The patient has been notified of following:     \"This video visit will be conducted via a call between you and your physician/provider. We have found that certain health care needs can be provided without the need for an in-person physical exam.  This service lets us provide the care you need with a video conversation.  If a prescription is necessary we can send it directly to your pharmacy.  If lab work is needed we can place an order for that and you can then stop by our lab to have the test done at a later time.    Video visits are billed at different rates depending on your insurance coverage.  Please reach out to your insurance provider with any questions.    If during the course of the call the physician/provider feels a video visit is not appropriate, you will not be charged for this service.\"    Patient has given verbal consent for Video visit? Yes  How would you like to obtain your AVS? MyChart  If you are dropped from the video visit, the video invite should be resent to: Text to cell phone: [phone]  Will anyone else be joining your video visit? No      Video-Visit Details    Type of service:  Video Visit    Video Start Time: 1:30 pm  Video End Time: 1:54 pm    Originating Location (pt. Location): Home    Distant Location (provider location):  [facility] RHEUMATOLOGY     Platform used for Video Visit: Thad Lopez MD        "

## 2020-09-09 NOTE — PROGRESS NOTES
[facility]  Rheumatology Clinic Virtual Visit Note  Zulma Lopez MD  DOS:  2020   Date of last visit: 2020    Name: Betty Tee  MRN: [id number]  Age: 80 year old  : 1940  Reason for visit: Follow up visit for B/L hip pain, primary Sjogren's syndrome    #B/L hip trochanteric bursitis  # Sjogren's syndrome since  with borderline +RG, +SSA, and lip biopsy focus score of 1.  # prednisone-responsive polyarthritis  # Follicular bronchiolitis, prior mild ILD  # Osteopenia on DEXA 2019 with T score=-2.1 with decline in bone density  # Chronic prednisone use  # DM  # A fib     Assessment and Plan:    New Dx of PMR. Severe R hip pain is due to severe OA based on 2020 hip MRI. She prefers to avoid hip arthroplasty, I sent Dr. Esa chen msg to discuss tx options with Sister Betty. Her inflammation markers improved on prednisone, PMR responded to prednisone.    Primary Sjogren's syndrome with ILD   Sister Sita returns with stable PFTs and improvement on most recent chest CT showing bronchiolitis, but no ILD. Completed pulmonary rehab in 2019 where she was able to exercise without oxygen.     On HCQ since 2019 with significant  Improvement of joint pain.    Tolerates HCQ well. Her dry mouth is tolerable. Breathing is stable. No other complaints today.      Plan:    Norco bid for pain    Labs    Follow up with Dr. See    Stay on fosamax weekly    Yearly eye exam on HCQ    Return in 3 months        Orders Placed This Encounter   Procedures     ALT     Albumin level     AST     CBC with platelets differential     Creatinine     CRP inflammation     Erythrocyte sedimentation rate auto       Video Start Time: 1:30 pm  Video End Time: 1:54 pm    Zulma Lopez MD      Subjective:  Betty Tee is a 79 year old female with a history of primary Sjogren's syndrome who presents for follow-up in the setting of recent pain flare in her bilateral hips x several weeks. The pain has been  more intense and longer lasting than previous joint pain. Lidocaine patches have helped, and she increased her prednisone (20 mg x 5 days, then 7.5 mg through today) per rheumatology but it did not help the pain. She reports this recent pain flare has made her feel discouraged and down.     HPI:   Betty Tee is a 79 year old female with a history of primary Sjogren's syndrome who presents for follow-up. She was diagnosed with Sjogren's syndrome in 2015 with borderline +RG, +SSA, and lip biopsy focus score of 1. Associated ILD and joint pain are prednisone-responsive which support the diagnosis. Ocular staining score has been deferred given other sources of diagnosis.    Interval history: 6/7/18   Today, the patient states she is feeling short of breath as she did not bring her oxygen tank with her. She states she has been feeling a bit more short of breath at home while going up and down her basement steps recently. She last saw her pulmonologist, Dr. Walsh, on 1/10/2018 and does not have recent pulmonary function tests. She has not needed to increase her oxygen levels at home, noting she has always used 4 liters, which seems to be sufficient for her. When she is out of the house she frequently leaves her oxygen tank at home. She does currently have an irritating, non-productive cough that began approximately one week ago with associated postnasal drip. She has been experiencing night sweats and infrequent chills this week but denies any measured fevers.     She denies any joint pains today, but does state she had a flare of joint pain, specifically in her right knee, a few weeks ago. The pain hit very intensely prior to resolving.      With regards to starting rituximab, she voices she discussed this with her primary care provider who does not feel this is a good idea.     Interval history: 3/8/18  Patient reported doing rather well but did mention some trouble with intermittent arthralgias. We discussed  consideration of rituximab given problems stemming from long-term prednisone use and she elected to think about this option. Plan was made for continuation of 10mg of prednisone daily for the time being. Recommended starting Fosamax, 70mg, once per week, however she was hesitant about side effects.     8/16/2019: Former pt of Dr. Stringer is here today to transfer care. Last night, woke up with pain over R ribs. Similar pain happened in 7/2019, self-resolved. Her x-rays at that time were suggestive of questionable rib Fx. On  mg bid+prednisone 6/8 mg every other day (5/13/2019). Dry mouth is bothersome, using OTC biotene but CPAP makes it worse. Report losing bladder control on occasions.     (1/15/2020): Patient returns today with several weeks of intense bilateral hip pain. It's more intense and has been longer-lasting than previous joint pain. She was on a prednisone taper and was at 5 mg daily when this pain started. Per rheumatology, she went up to 20 mg prednisone daily x 5 days, now 7.5 mg daily x 7 days but the prednisone increase did not help the pain. Lidocaine patches have helped her hip pain. Her dry mouth has been tolerable, she's taking evoxac once per day. No changes in breathing.       7/1/2020: Feeling much better after taking the prednisone 20 mg every day taper, hip pain was gone,coming back after prednisone taper. On fosamax since last visit.    Today 9/9/2020: Prednisone helped with PMR sx. Her major complaint is severe R hip pain. Saw ortho, had R hip MRI on 9/5/2020. Not taking norco right now, tries to limit use.    Review of Systems:   A comprehensive ROS was done. Positives are per HPI.      Active Medications:     Outpatient Medications Prior to Visit   Medication Sig Dispense Refill     acetaminophen (TYLENOL) 500 MG tablet Take 1,000 mg by mouth every 8 hours as needed (max 6 tablets/24 hours, 2 tablets/dose)        acyclovir (ZOVIRAX) 5 % external ointment Apply topically 6 times  daily As needed for outbreaks 15 g 3     albuterol (PROAIR HFA, PROVENTIL HFA, VENTOLIN HFA) 108 (90 BASE) MCG/ACT inhaler Inhale 2 puffs into the lungs every 6 hours 1 Inhaler 3     alendronate (FOSAMAX) 70 MG tablet Take 1 tablet (70 mg) by mouth every 7 days 12 tablet 1     atorvastatin (LIPITOR) 10 MG tablet TAKE 1/2 TABLET BY MOUTH EVERY DAY 45 tablet 0     azithromycin (ZITHROMAX) 250 MG tablet Take 1 tablet (250 mg) by mouth daily 30 tablet 11     CEVIMELINE 30 MG PO capsule TAKE 1 CAPSULE (30 MG) BY MOUTH 3 TIMES DAILY (Patient taking differently: Take 30 mg by mouth every other day ) 270 capsule 2     escitalopram (LEXAPRO) 20 MG tablet TAKE 1 TABLET BY MOUTH EVERY DAY 90 tablet 0     fluticasone (FLONASE) 50 MCG/ACT nasal spray Spray 1-2 sprays into both nostrils daily as needed for allergies 18.2 mL 11     fluticasone-vilanterol (BREO ELLIPTA) 100-25 MCG/INH inhaler Inhale 1 puff into the lungs daily 1 Inhaler 11     gabapentin (NEURONTIN) 300 MG capsule Take 1 capsule (300 mg) by mouth 3 times daily 90 capsule 0     hydroxychloroquine (PLAQUENIL) 200 MG tablet TAKE 2 TABLETS (400 MG) BY MOUTH DAILY ANNUAL PLAQUENIL TOXICITY EYE SCREENING REQUIRED. 180 tablet 1     insulin glargine (BASAGLAR KWIKPEN) 100 UNIT/ML pen INJECT 22 UNITS SUBCUTANEOUS DAILY (TO REPLACE LANTUS) 15 mL 1     lidocaine (LIDODERM) 5 % patch Apply patch to painful area for up to 12 h within a 24 h period.  Remove after 12 hours. 30 patch 5     loratadine (CLARITIN) 10 MG tablet TAKE 1 TABLET BY MOUTH EVERY DAY 90 tablet 1     metoprolol succinate ER (TOPROL-XL) 25 MG 24 hr tablet Take 0.5 tablets (12.5 mg) by mouth 2 times daily Take 50 mg by mouth in combination with 12.5 for a total of 62.5 twice a day 90 tablet 3     metoprolol succinate ER (TOPROL-XL) 50 MG 24 hr tablet Take 50 mg by mouth in combination with 12.5 for a total of 62.5 twice a day 180 tablet 3     montelukast (SINGULAIR) 10 MG tablet TAKE 1 TABLET BY MOUTH EVERYDAY  AT BEDTIME 30 tablet 5     NOVOLOG FLEXPEN 100 UNIT/ML soln INJECT 12 UNITS SUBCUTANEOUS 3 TIMES DAILY (WITH MEALS) 15 mL 1     OZEMPIC, 0.25 OR 0.5 MG/DOSE, 2 MG/1.5ML SOPN INJECT 0.5 MG SUBCUTANEOUS ONCE A WEEK 4.5 pen 1     potassium chloride ER (KLOR-CON) 20 MEQ CR tablet Take 2 tablets (40 mEq) by mouth every morning AND 1 tablet (20 mEq) every evening. 270 tablet 3     predniSONE (DELTASONE) 1 MG tablet 15-12.5-10-9-8-7-6 mg a day each for 2 weeks then 5 mg a day, use with 5 mg size tab 90 tablet 2     rivaroxaban ANTICOAGULANT (XARELTO ANTICOAGULANT) 20 MG PO TABS tablet Take 1 tablet (20 mg) by mouth daily (with dinner) 90 tablet 2     spironolactone (ALDACTONE) 25 MG tablet Take 2 tablets (50 mg) by mouth daily 180 tablet 3     torsemide (DEMADEX) 10 MG tablet TAKE ONE TAB (10 MG) WITH ONE 20 MG TAB TO = 30 MG DAILY IN AFTERNOON. 90 tablet 3     torsemide (DEMADEX) 20 MG tablet TAKE 2 TABS (40 MG) IN AM DAILY AND TAKE 1 TAB BY MOUTH IN THE AFTERNOON ALONG W/ A 10 MG  tablet 4     traZODone (DESYREL) 50 MG tablet TAKE 0.5-1.5 TABLETS (25-75 MG) BY MOUTH NIGHTLY AS NEEDED FOR SLEEP 135 tablet 0     vitamin D3 (CHOLECALCIFEROL) 2000 units (50 mcg) tablet Take 1 tablet (2,000 Units) by mouth daily 90 tablet 3     No facility-administered medications prior to visit.      Allergies:   Augmentin\  Codeine  Phenobarbital  Seasonal allergies      Past Medical History:  Alcohol abuse, in remission.   Allergic rhinitis.   Antiplatelet long-term use.   Atrial fibrillation.   Cardiomegaly.   Osteopenia.   Diverticulosis.   Benign essential hypertension.   GERD.   Insomnia.   Irregular heart beat.   Lumbago.   Major depressive disorder, recurrent episode, moderate.   ANGELICA.  Osteoarthrosis.   Sjogren's syndrome.   Sleep apnea.   Tobacco use disorder.   TMJ disorder.   RLS.  Major depressive disorder.   Hypertension.   Sciatica.   Colouterine fistula.   Left ventricular hypertrophy.   Breat fibroadenoma.   DVT.    Fatty liver disease, nonalcoholic.   Rib pain.   Neck mass.   Interstitial lung disease.   Acute and chronic respiratory failure with hypoxia.   Type II diabetes mellitus.   Hyperlipidemia.   Inflammatory arthritis.      Past Surgical History:  Back surgery.   Left breast biopsy.   Appendectomy.   Exploratory laparotomy.   Cardiac surgery.   Cholecystectomy.   Left colectomy.   Total abdominal hysterectomy with bilateral salpingo-oophorectomy.   Insert ureter stent.   Flexible sigmoidoscopy.   Ileostomy takedown.     Family History:   Mother: Positive for CAD, heart disease, hypertension, cerebrovascular disease, hyperlipidemia.   Father: Positive for alcohol/drug abuse, Alzheimer disease, dementia, hypertension, hyperlipidemia.   Sister: Positive for CAD, hypertension, and colorectal cancer.   Sister: Positive for hypertension and hyperlipidemia.   Sister: Positive for diabetes, lung cancer, asthma, and heart disease.   Brother: Positive for Parkinsonism and substance abuse.   Brother: Positive for hypertension, diverticulitis, and prostate cancer.   Daughter: Positive for breast cancer.        Social History:   She is a former smoker; quit in 2011. She has a history of alcohol abuse but stopped drinking in 1986.      Physical Exam:   Constitutional: Well-developed, appearing stated age; cooperative  Eyes: Normal EOM, conjunctiva, sclera  MS: no synovitis, painful ROM of R hip  Skin: No alopecia, rash  Neuro: grossly non-focal  Psych: Normal affect.    Images:  CT Chest w/o contrast (7/25/18):  Findings remain most consistent with small airway disease  and/or nonclassifiable interstitial lung disease and are not  significantly changed from the March 2017 comparison.    Per radiology.    Laboratory:   RHEUM RESULTS Latest Ref Rng & Units 6/19/2020 7/10/2020 8/27/2020   COMPLEMENT C3 81 - 157 mg/dL 142 - -   COMPLEMENT C4 13 - 39 mg/dL 33 - -   SED RATE 0 - 30 mm/h 59(H) 30 -   CRP, INFLAMMATION 0.0 - 8.0 mg/L  88.3(H) 17.0(H) -   CK TOTAL 30 - 225 U/L - - -   RHEUMATOID FACTOR <12 IU/mL <7 - -   RG SCREEN BY EIA <1.0 - - -   AST 0 - 45 U/L 10 - -   ALT 0 - 50 U/L 12 - -   ALBUMIN 3.4 - 5.0 g/dL 3.2(L) - -   WBC 4.0 - 11.0 10e9/L 9.7 - -   RBC 3.8 - 5.2 10e12/L 3.95 - -   HGB 11.7 - 15.7 g/dL 11.9 - -   HCT 35.0 - 47.0 % 37.8 - -   MCV 78 - 100 fl 96 - -   MCHC 31.5 - 36.5 g/dL 31.5 - -   RDW 10.0 - 15.0 % 14.6 - -    - 450 10e9/L 209 - -   CREATININE 0.52 - 1.04 mg/dL 0.83 - 0.94   GFR ESTIMATE, IF BLACK >60 mL/min/[1.73:m2] 77 - 67   GFR ESTIMATE >60 mL/min/[1.73:m2] 67 - 58(L)    - 1,616 mg/dL 1,196 - -   IGA 84 - 499 mg/dL 639(H) - -   IGM 35 - 242 mg/dL 122 - -       Rheumatoid Factor   Date Value Ref Range Status   07/24/2015 <20 <20 IU/mL Final   ,  ,   Cyclic Cit Pept IgG/IgA   Date Value Ref Range Status   07/24/2015 <20  Interpretation:  Negative   <20 UNITS Final   ,  ,   Scleroderma Antibody Scl-70 CECILIA IgG   Date Value Ref Range Status   07/24/2015  0.0 - 0.9 AI Final    <0.2  Negative   Antibody index (AI) values reflect qualitative changes in antibody   concentration that cannot be directly associated with clinical condition or   disease state.       SSA (Ro) (CECILIA) Antibody, IgG   Date Value Ref Range Status   07/24/2015 1.6 (H) 0.0 - 0.9 AI Final     Comment:     Positive   Antibody index (AI) values reflect qualitative changes in antibody   concentration that cannot be directly associated with clinical condition or   disease state.       SSB (La) (CECILIA) Antibody, IgG   Date Value Ref Range Status   07/24/2015  0.0 - 0.9 AI Final    <0.2  Negative   Antibody index (AI) values reflect qualitative changes in antibody   concentration that cannot be directly associated with clinical condition or   disease state.       ,  ,   RG Screen by EIA   Date Value Ref Range Status   07/24/2015 <1.0  Interpretation:  Negative   <1.0 Final   ,  ,  ,  ,  ,  ,  ,  ,  ,  ,  ,  ,  ,  ,  ,  ,  ,  ,   Neutrophil  Cytoplasmic IgG Antibody   Date Value Ref Range Status   07/24/2015   Final    <1:20  Reference range: <1:20  (Note)  The ANCA IFA is <1:20; therefore, no further testing will  be performed.  INTERPRETIVE INFORMATION: Anti-Neutrophil Cyto Ab, IgG  Neutrophil Cytoplasmic Antibodies (C-ANCA = granular  cytoplasmic staining, P-ANCA = perinuclear staining) are  found in the serum of over 90 percent of patients with  certain necrotizing systemic vasculitides, and usually in  less than 5 percent of patients with collagen vascular  disease or arthritis.  Performed by [facility],  [address] [place] [id number] [phone]  [email or web address], Burke Mckeon MD, Lab. Director       ,  ,  ,  ,  ,  ,  ,   IGG   Date Value Ref Range Status   07/24/2015 1320 695 - 1620 mg/dL Final   ,  ,  ,  ,  ,   Scleroderma Antibody Scl-70 CECILIA IgG   Date Value Ref Range Status   07/24/2015  0.0 - 0.9 AI Final    <0.2  Negative   Antibody index (AI) values reflect qualitative changes in antibody   concentration that cannot be directly associated with clinical condition or   disease state.       Component      Latest Ref Rng & Units 6/19/2020   WBC      4.0 - 11.0 10e9/L 9.7   RBC Count      3.8 - 5.2 10e12/L 3.95   Hemoglobin      11.7 - 15.7 g/dL 11.9   Hematocrit      35.0 - 47.0 % 37.8   MCV      78 - 100 fl 96   MCH      26.5 - 33.0 pg 30.1   MCHC      31.5 - 36.5 g/dL 31.5   RDW      10.0 - 15.0 % 14.6   Platelet Count      150 - 450 10e9/L 209   Diff Method       Automated Method   % Neutrophils      % 76.5   % Lymphocytes      % 9.7   % Monocytes      % 11.6   % Eosinophils      % 1.1   % Basophils      % 0.3   % Immature Granulocytes      % 0.8   Nucleated RBCs      0 /100 0   Absolute Neutrophil      1.6 - 8.3 10e9/L 7.4   Absolute Lymphocytes      0.8 - 5.3 10e9/L 0.9   Absolute Monocytes      0.0 - 1.3 10e9/L 1.1   Absolute Eosinophils      0.0 - 0.7 10e9/L 0.1   Absolute Basophils      0.0 - 0.2 10e9/L 0.0   Abs Immature  Granulocytes      0 - 0.4 10e9/L 0.1   Absolute Nucleated RBC       0.0   Color Urine       Straw   Appearance Urine       Clear   Glucose Urine      NEG:Negative mg/dL Negative   Bilirubin Urine      NEG:Negative Negative   Ketones Urine      NEG:Negative mg/dL Negative   Specific Gravity Urine      1.003 - 1.035 1.005   Blood Urine      NEG:Negative Negative   pH Urine      5.0 - 7.0 pH 6.0   Protein Albumin Urine      NEG:Negative mg/dL Negative   Urobilinogen mg/dL      0.0 - 2.0 mg/dL 0.0   Nitrite Urine      NEG:Negative Negative   Leukocyte Esterase Urine      NEG:Negative Negative   Source       Midstream Urine   WBC Urine      0 - 5 /HPF <1   RBC Urine      0 - 2 /HPF 1   Bacteria Urine      NEG:Negative /HPF Few (A)   Albumin Fraction      3.7 - 5.1 g/dL 3.7   Alpha 1 Fraction      0.2 - 0.4 g/dL 0.4   Alpha 2 Fraction      0.5 - 0.9 g/dL 1.1 (H)   Beta Fraction      0.6 - 1.0 g/dL 1.2 (H)   Gamma Fraction      0.7 - 1.6 g/dL 1.2   Monoclonal Peak      0.0 g/dL 0.2 (H)   ELP Interpretation:       Small monoclonal protein (0.2 g/dL) seen in the gamma fraction. See immunofixation report . . .   Immunofixation ELP       (Note)   IGG      610 - 1,616 mg/dL 1,196   IGA      84 - 499 mg/dL 639 (H)   IGM      35 - 242 mg/dL 122   Creatinine Urine      mg/dL 9   Albumin Urine mg/L      mg/L 9   Albumin Urine mg/g Cr      0 - 25 mg/g Cr 103.31 (H)   Protein Random Urine      g/L 0.07   Protein Total Urine g/gr Creatinine      0 - 0.2 g/g Cr 0.78 (H)   Rheumatoid Factor      <12 IU/mL <7   Cyclic Citrullinated Peptide Antibody, IgG      <7 U/mL 2   Immunofix ELP Urine       No monoclonal protein seen on immunofixation.  Pathological significance requires clinical . . .   Vitamin D Deficiency screening      20 - 75 ug/L 45   Cryoglobulin      NEG:Negative % Trace (A)   Sed Rate      0 - 30 mm/h 59 (H)   CRP Inflammation      0.0 - 8.0 mg/L 88.3 (H)   Complement C3      81 - 157 mg/dL 142   Complement C4      13 - 39  mg/dL 33   AST      0 - 45 U/L 10   Albumin      3.4 - 5.0 g/dL 3.2 (L)   ALT      0 - 50 U/L 12

## 2020-09-09 NOTE — LETTER
"2020       RE: Betty Tee  [address]  [facility] [address]     Dear Colleague,    Thank you for referring your patient, Betty Tee, to the [facility] RHEUMATOLOGY at [facility]. Please see a copy of my visit note below.    Betty Tee is a 80 year old female who is being evaluated via a billable video visit.      The patient has been notified of following:     \"This video visit will be conducted via a call between you and your physician/provider. We have found that certain health care needs can be provided without the need for an in-person physical exam.  This service lets us provide the care you need with a video conversation.  If a prescription is necessary we can send it directly to your pharmacy.  If lab work is needed we can place an order for that and you can then stop by our lab to have the test done at a later time.    Video visits are billed at different rates depending on your insurance coverage.  Please reach out to your insurance provider with any questions.    If during the course of the call the physician/provider feels a video visit is not appropriate, you will not be charged for this service.\"    Patient has given verbal consent for Video visit? Yes  How would you like to obtain your AVS? MyChart  If you are dropped from the video visit, the video invite should be resent to: Text to cell phone: [phone]  Will anyone else be joining your video visit? No      Video-Visit Details    Type of service:  Video Visit    Video Start Time: 1:30 pm  Video End Time: 1:54 pm    Originating Location (pt. Location): Home    Distant Location (provider location):  [facility] RHEUMATOLOGY     Platform used for Video Visit: Thad Lopez MD          [facility]  Rheumatology Clinic Virtual Visit Note  Zulma Lopez MD  DOS:  2020   Date of last visit: 2020    Name: Betty Tee  MRN: [id number]  Age: 80 year old  : " 1940  Reason for visit: Follow up visit for B/L hip pain, primary Sjogren's syndrome    #B/L hip trochanteric bursitis  # Sjogren's syndrome since 2015 with borderline +RG, +SSA, and lip biopsy focus score of 1.  # prednisone-responsive polyarthritis  # Follicular bronchiolitis, prior mild ILD  # Osteopenia on DEXA 8/2019 with T score=-2.1 with decline in bone density  # Chronic prednisone use  # DM  # A fib     Assessment and Plan:    New Dx of PMR. Severe R hip pain is due to severe OA based on 9/5/2020 hip MRI. She prefers to avoid hip arthroplasty, I sent Dr. Esa chen msg to discuss tx options with Sister Betty. Her inflammation markers improved on prednisone, PMR responded to prednisone.    Primary Sjogren's syndrome with ILD   Sister Sita returns with stable PFTs and improvement on most recent chest CT showing bronchiolitis, but no ILD. Completed pulmonary rehab in 2/2019 where she was able to exercise without oxygen.     On HCQ since 4/2019 with significant  Improvement of joint pain.    Tolerates HCQ well. Her dry mouth is tolerable. Breathing is stable. No other complaints today.      Plan:    Norco bid for pain    Labs    Follow up with Dr. See    Stay on fosamax weekly    Yearly eye exam on HCQ    Return in 3 months        Orders Placed This Encounter   Procedures     ALT     Albumin level     AST     CBC with platelets differential     Creatinine     CRP inflammation     Erythrocyte sedimentation rate auto       Video Start Time: 1:30 pm  Video End Time: 1:54 pm    Zulma Lopez MD      Subjective:  Betty Tee is a 79 year old female with a history of primary Sjogren's syndrome who presents for follow-up in the setting of recent pain flare in her bilateral hips x several weeks. The pain has been more intense and longer lasting than previous joint pain. Lidocaine patches have helped, and she increased her prednisone (20 mg x 5 days, then 7.5 mg through today) per rheumatology but it  did not help the pain. She reports this recent pain flare has made her feel discouraged and down.     HPI:   Betty Tee is a 79 year old female with a history of primary Sjogren's syndrome who presents for follow-up. She was diagnosed with Sjogren's syndrome in 2015 with borderline +RG, +SSA, and lip biopsy focus score of 1. Associated ILD and joint pain are prednisone-responsive which support the diagnosis. Ocular staining score has been deferred given other sources of diagnosis.    Interval history: 6/7/18   Today, the patient states she is feeling short of breath as she did not bring her oxygen tank with her. She states she has been feeling a bit more short of breath at home while going up and down her basement steps recently. She last saw her pulmonologist, Dr. Walsh, on 1/10/2018 and does not have recent pulmonary function tests. She has not needed to increase her oxygen levels at home, noting she has always used 4 liters, which seems to be sufficient for her. When she is out of the house she frequently leaves her oxygen tank at home. She does currently have an irritating, non-productive cough that began approximately one week ago with associated postnasal drip. She has been experiencing night sweats and infrequent chills this week but denies any measured fevers.     She denies any joint pains today, but does state she had a flare of joint pain, specifically in her right knee, a few weeks ago. The pain hit very intensely prior to resolving.      With regards to starting rituximab, she voices she discussed this with her primary care provider who does not feel this is a good idea.     Interval history: 3/8/18  Patient reported doing rather well but did mention some trouble with intermittent arthralgias. We discussed consideration of rituximab given problems stemming from long-term prednisone use and she elected to think about this option. Plan was made for continuation of 10mg of prednisone daily for the  time being. Recommended starting Fosamax, 70mg, once per week, however she was hesitant about side effects.     8/16/2019: Former pt of Dr. Stringer is here today to transfer care. Last night, woke up with pain over R ribs. Similar pain happened in 7/2019, self-resolved. Her x-rays at that time were suggestive of questionable rib Fx. On  mg bid+prednisone 6/8 mg every other day (5/13/2019). Dry mouth is bothersome, using OTC biotene but CPAP makes it worse. Report losing bladder control on occasions.     (1/15/2020): Patient returns today with several weeks of intense bilateral hip pain. It's more intense and has been longer-lasting than previous joint pain. She was on a prednisone taper and was at 5 mg daily when this pain started. Per rheumatology, she went up to 20 mg prednisone daily x 5 days, now 7.5 mg daily x 7 days but the prednisone increase did not help the pain. Lidocaine patches have helped her hip pain. Her dry mouth has been tolerable, she's taking evoxac once per day. No changes in breathing.       7/1/2020: Feeling much better after taking the prednisone 20 mg every day taper, hip pain was gone,coming back after prednisone taper. On fosamax since last visit.    Today 9/9/2020: Prednisone helped with PMR sx. Her major complaint is severe R hip pain. Saw ortho, had R hip MRI on 9/5/2020. Not taking norco right now, tries to limit use.    Review of Systems:   A comprehensive ROS was done. Positives are per HPI.      Active Medications:     Outpatient Medications Prior to Visit   Medication Sig Dispense Refill     acetaminophen (TYLENOL) 500 MG tablet Take 1,000 mg by mouth every 8 hours as needed (max 6 tablets/24 hours, 2 tablets/dose)        acyclovir (ZOVIRAX) 5 % external ointment Apply topically 6 times daily As needed for outbreaks 15 g 3     albuterol (PROAIR HFA, PROVENTIL HFA, VENTOLIN HFA) 108 (90 BASE) MCG/ACT inhaler Inhale 2 puffs into the lungs every 6 hours 1 Inhaler 3      alendronate (FOSAMAX) 70 MG tablet Take 1 tablet (70 mg) by mouth every 7 days 12 tablet 1     atorvastatin (LIPITOR) 10 MG tablet TAKE 1/2 TABLET BY MOUTH EVERY DAY 45 tablet 0     azithromycin (ZITHROMAX) 250 MG tablet Take 1 tablet (250 mg) by mouth daily 30 tablet 11     CEVIMELINE 30 MG PO capsule TAKE 1 CAPSULE (30 MG) BY MOUTH 3 TIMES DAILY (Patient taking differently: Take 30 mg by mouth every other day ) 270 capsule 2     escitalopram (LEXAPRO) 20 MG tablet TAKE 1 TABLET BY MOUTH EVERY DAY 90 tablet 0     fluticasone (FLONASE) 50 MCG/ACT nasal spray Spray 1-2 sprays into both nostrils daily as needed for allergies 18.2 mL 11     fluticasone-vilanterol (BREO ELLIPTA) 100-25 MCG/INH inhaler Inhale 1 puff into the lungs daily 1 Inhaler 11     gabapentin (NEURONTIN) 300 MG capsule Take 1 capsule (300 mg) by mouth 3 times daily 90 capsule 0     hydroxychloroquine (PLAQUENIL) 200 MG tablet TAKE 2 TABLETS (400 MG) BY MOUTH DAILY ANNUAL PLAQUENIL TOXICITY EYE SCREENING REQUIRED. 180 tablet 1     insulin glargine (BASAGLAR KWIKPEN) 100 UNIT/ML pen INJECT 22 UNITS SUBCUTANEOUS DAILY (TO REPLACE LANTUS) 15 mL 1     lidocaine (LIDODERM) 5 % patch Apply patch to painful area for up to 12 h within a 24 h period.  Remove after 12 hours. 30 patch 5     loratadine (CLARITIN) 10 MG tablet TAKE 1 TABLET BY MOUTH EVERY DAY 90 tablet 1     metoprolol succinate ER (TOPROL-XL) 25 MG 24 hr tablet Take 0.5 tablets (12.5 mg) by mouth 2 times daily Take 50 mg by mouth in combination with 12.5 for a total of 62.5 twice a day 90 tablet 3     metoprolol succinate ER (TOPROL-XL) 50 MG 24 hr tablet Take 50 mg by mouth in combination with 12.5 for a total of 62.5 twice a day 180 tablet 3     montelukast (SINGULAIR) 10 MG tablet TAKE 1 TABLET BY MOUTH EVERYDAY AT BEDTIME 30 tablet 5     NOVOLOG FLEXPEN 100 UNIT/ML soln INJECT 12 UNITS SUBCUTANEOUS 3 TIMES DAILY (WITH MEALS) 15 mL 1     OZEMPIC, 0.25 OR 0.5 MG/DOSE, 2 MG/1.5ML SOPN INJECT  0.5 MG SUBCUTANEOUS ONCE A WEEK 4.5 pen 1     potassium chloride ER (KLOR-CON) 20 MEQ CR tablet Take 2 tablets (40 mEq) by mouth every morning AND 1 tablet (20 mEq) every evening. 270 tablet 3     predniSONE (DELTASONE) 1 MG tablet 15-12.5-10-9-8-7-6 mg a day each for 2 weeks then 5 mg a day, use with 5 mg size tab 90 tablet 2     rivaroxaban ANTICOAGULANT (XARELTO ANTICOAGULANT) 20 MG PO TABS tablet Take 1 tablet (20 mg) by mouth daily (with dinner) 90 tablet 2     spironolactone (ALDACTONE) 25 MG tablet Take 2 tablets (50 mg) by mouth daily 180 tablet 3     torsemide (DEMADEX) 10 MG tablet TAKE ONE TAB (10 MG) WITH ONE 20 MG TAB TO = 30 MG DAILY IN AFTERNOON. 90 tablet 3     torsemide (DEMADEX) 20 MG tablet TAKE 2 TABS (40 MG) IN AM DAILY AND TAKE 1 TAB BY MOUTH IN THE AFTERNOON ALONG W/ A 10 MG  tablet 4     traZODone (DESYREL) 50 MG tablet TAKE 0.5-1.5 TABLETS (25-75 MG) BY MOUTH NIGHTLY AS NEEDED FOR SLEEP 135 tablet 0     vitamin D3 (CHOLECALCIFEROL) 2000 units (50 mcg) tablet Take 1 tablet (2,000 Units) by mouth daily 90 tablet 3     No facility-administered medications prior to visit.      Allergies:   Augmentin\  Codeine  Phenobarbital  Seasonal allergies      Past Medical History:  Alcohol abuse, in remission.   Allergic rhinitis.   Antiplatelet long-term use.   Atrial fibrillation.   Cardiomegaly.   Osteopenia.   Diverticulosis.   Benign essential hypertension.   GERD.   Insomnia.   Irregular heart beat.   Lumbago.   Major depressive disorder, recurrent episode, moderate.   ANGELICA.  Osteoarthrosis.   Sjogren's syndrome.   Sleep apnea.   Tobacco use disorder.   TMJ disorder.   RLS.  Major depressive disorder.   Hypertension.   Sciatica.   Colouterine fistula.   Left ventricular hypertrophy.   Breat fibroadenoma.   DVT.   Fatty liver disease, nonalcoholic.   Rib pain.   Neck mass.   Interstitial lung disease.   Acute and chronic respiratory failure with hypoxia.   Type II diabetes mellitus.    Hyperlipidemia.   Inflammatory arthritis.      Past Surgical History:  Back surgery.   Left breast biopsy.   Appendectomy.   Exploratory laparotomy.   Cardiac surgery.   Cholecystectomy.   Left colectomy.   Total abdominal hysterectomy with bilateral salpingo-oophorectomy.   Insert ureter stent.   Flexible sigmoidoscopy.   Ileostomy takedown.     Family History:   Mother: Positive for CAD, heart disease, hypertension, cerebrovascular disease, hyperlipidemia.   Father: Positive for alcohol/drug abuse, Alzheimer disease, dementia, hypertension, hyperlipidemia.   Sister: Positive for CAD, hypertension, and colorectal cancer.   Sister: Positive for hypertension and hyperlipidemia.   Sister: Positive for diabetes, lung cancer, asthma, and heart disease.   Brother: Positive for Parkinsonism and substance abuse.   Brother: Positive for hypertension, diverticulitis, and prostate cancer.   Daughter: Positive for breast cancer.        Social History:   She is a former smoker; quit in 2011. She has a history of alcohol abuse but stopped drinking in 1986.      Physical Exam:   Constitutional: Well-developed, appearing stated age; cooperative  Eyes: Normal EOM, conjunctiva, sclera  MS: no synovitis, painful ROM of R hip  Skin: No alopecia, rash  Neuro: grossly non-focal  Psych: Normal affect.    Images:  CT Chest w/o contrast (7/25/18):  Findings remain most consistent with small airway disease  and/or nonclassifiable interstitial lung disease and are not  significantly changed from the March 2017 comparison.    Per radiology.    Laboratory:   RHEUM RESULTS Latest Ref Rng & Units 6/19/2020 7/10/2020 8/27/2020   COMPLEMENT C3 81 - 157 mg/dL 142 - -   COMPLEMENT C4 13 - 39 mg/dL 33 - -   SED RATE 0 - 30 mm/h 59(H) 30 -   CRP, INFLAMMATION 0.0 - 8.0 mg/L 88.3(H) 17.0(H) -   CK TOTAL 30 - 225 U/L - - -   RHEUMATOID FACTOR <12 IU/mL <7 - -   RG SCREEN BY EIA <1.0 - - -   AST 0 - 45 U/L 10 - -   ALT 0 - 50 U/L 12 - -   ALBUMIN 3.4  - 5.0 g/dL 3.2(L) - -   WBC 4.0 - 11.0 10e9/L 9.7 - -   RBC 3.8 - 5.2 10e12/L 3.95 - -   HGB 11.7 - 15.7 g/dL 11.9 - -   HCT 35.0 - 47.0 % 37.8 - -   MCV 78 - 100 fl 96 - -   MCHC 31.5 - 36.5 g/dL 31.5 - -   RDW 10.0 - 15.0 % 14.6 - -    - 450 10e9/L 209 - -   CREATININE 0.52 - 1.04 mg/dL 0.83 - 0.94   GFR ESTIMATE, IF BLACK >60 mL/min/[1.73:m2] 77 - 67   GFR ESTIMATE >60 mL/min/[1.73:m2] 67 - 58(L)    - 1,616 mg/dL 1,196 - -   IGA 84 - 499 mg/dL 639(H) - -   IGM 35 - 242 mg/dL 122 - -       Rheumatoid Factor   Date Value Ref Range Status   07/24/2015 <20 <20 IU/mL Final   ,  ,   Cyclic Cit Pept IgG/IgA   Date Value Ref Range Status   07/24/2015 <20  Interpretation:  Negative   <20 UNITS Final   ,  ,   Scleroderma Antibody Scl-70 CECILIA IgG   Date Value Ref Range Status   07/24/2015  0.0 - 0.9 AI Final    <0.2  Negative   Antibody index (AI) values reflect qualitative changes in antibody   concentration that cannot be directly associated with clinical condition or   disease state.       SSA (Ro) (CECILIA) Antibody, IgG   Date Value Ref Range Status   07/24/2015 1.6 (H) 0.0 - 0.9 AI Final     Comment:     Positive   Antibody index (AI) values reflect qualitative changes in antibody   concentration that cannot be directly associated with clinical condition or   disease state.       SSB (La) (CECILIA) Antibody, IgG   Date Value Ref Range Status   07/24/2015  0.0 - 0.9 AI Final    <0.2  Negative   Antibody index (AI) values reflect qualitative changes in antibody   concentration that cannot be directly associated with clinical condition or   disease state.       ,  ,   RG Screen by EIA   Date Value Ref Range Status   07/24/2015 <1.0  Interpretation:  Negative   <1.0 Final   ,  ,  ,  ,  ,  ,  ,  ,  ,  ,  ,  ,  ,  ,  ,  ,  ,  ,   Neutrophil Cytoplasmic IgG Antibody   Date Value Ref Range Status   07/24/2015   Final    <1:20  Reference range: <1:20  (Note)  The ANCA IFA is <1:20; therefore, no further testing will  be  performed.  INTERPRETIVE INFORMATION: Anti-Neutrophil Cyto Ab, IgG  Neutrophil Cytoplasmic Antibodies (C-ANCA = granular  cytoplasmic staining, P-ANCA = perinuclear staining) are  found in the serum of over 90 percent of patients with  certain necrotizing systemic vasculitides, and usually in  less than 5 percent of patients with collagen vascular  disease or arthritis.  Performed by [facility],  [address] [place] [address] [phone]  [email or web address], Burke Mckeon MD, Lab. Director       ,  ,  ,  ,  ,  ,  ,   IGG   Date Value Ref Range Status   07/24/2015 1320 695 - 1620 mg/dL Final   ,  ,  ,  ,  ,   Scleroderma Antibody Scl-70 CECILIA IgG   Date Value Ref Range Status   07/24/2015  0.0 - 0.9 AI Final    <0.2  Negative   Antibody index (AI) values reflect qualitative changes in antibody   concentration that cannot be directly associated with clinical condition or   disease state.       Component      Latest Ref Rng & Units 6/19/2020   WBC      4.0 - 11.0 10e9/L 9.7   RBC Count      3.8 - 5.2 10e12/L 3.95   Hemoglobin      11.7 - 15.7 g/dL 11.9   Hematocrit      35.0 - 47.0 % 37.8   MCV      78 - 100 fl 96   MCH      26.5 - 33.0 pg 30.1   MCHC      31.5 - 36.5 g/dL 31.5   RDW      10.0 - 15.0 % 14.6   Platelet Count      150 - 450 10e9/L 209   Diff Method       Automated Method   % Neutrophils      % 76.5   % Lymphocytes      % 9.7   % Monocytes      % 11.6   % Eosinophils      % 1.1   % Basophils      % 0.3   % Immature Granulocytes      % 0.8   Nucleated RBCs      0 /100 0   Absolute Neutrophil      1.6 - 8.3 10e9/L 7.4   Absolute Lymphocytes      0.8 - 5.3 10e9/L 0.9   Absolute Monocytes      0.0 - 1.3 10e9/L 1.1   Absolute Eosinophils      0.0 - 0.7 10e9/L 0.1   Absolute Basophils      0.0 - 0.2 10e9/L 0.0   Abs Immature Granulocytes      0 - 0.4 10e9/L 0.1   Absolute Nucleated RBC       0.0   Color Urine       Straw   Appearance Urine       Clear   Glucose Urine      NEG:Negative mg/dL Negative    Bilirubin Urine      NEG:Negative Negative   Ketones Urine      NEG:Negative mg/dL Negative   Specific Gravity Urine      1.003 - 1.035 1.005   Blood Urine      NEG:Negative Negative   pH Urine      5.0 - 7.0 pH 6.0   Protein Albumin Urine      NEG:Negative mg/dL Negative   Urobilinogen mg/dL      0.0 - 2.0 mg/dL 0.0   Nitrite Urine      NEG:Negative Negative   Leukocyte Esterase Urine      NEG:Negative Negative   Source       Midstream Urine   WBC Urine      0 - 5 /HPF <1   RBC Urine      0 - 2 /HPF 1   Bacteria Urine      NEG:Negative /HPF Few (A)   Albumin Fraction      3.7 - 5.1 g/dL 3.7   Alpha 1 Fraction      0.2 - 0.4 g/dL 0.4   Alpha 2 Fraction      0.5 - 0.9 g/dL 1.1 (H)   Beta Fraction      0.6 - 1.0 g/dL 1.2 (H)   Gamma Fraction      0.7 - 1.6 g/dL 1.2   Monoclonal Peak      0.0 g/dL 0.2 (H)   ELP Interpretation:       Small monoclonal protein (0.2 g/dL) seen in the gamma fraction. See immunofixation report . . .   Immunofixation ELP       (Note)   IGG      610 - 1,616 mg/dL 1,196   IGA      84 - 499 mg/dL 639 (H)   IGM      35 - 242 mg/dL 122   Creatinine Urine      mg/dL 9   Albumin Urine mg/L      mg/L 9   Albumin Urine mg/g Cr      0 - 25 mg/g Cr 103.31 (H)   Protein Random Urine      g/L 0.07   Protein Total Urine g/gr Creatinine      0 - 0.2 g/g Cr 0.78 (H)   Rheumatoid Factor      <12 IU/mL <7   Cyclic Citrullinated Peptide Antibody, IgG      <7 U/mL 2   Immunofix ELP Urine       No monoclonal protein seen on immunofixation.  Pathological significance requires clinical . . .   Vitamin D Deficiency screening      20 - 75 ug/L 45   Cryoglobulin      NEG:Negative % Trace (A)   Sed Rate      0 - 30 mm/h 59 (H)   CRP Inflammation      0.0 - 8.0 mg/L 88.3 (H)   Complement C3      81 - 157 mg/dL 142   Complement C4      13 - 39 mg/dL 33   AST      0 - 45 U/L 10   Albumin      3.4 - 5.0 g/dL 3.2 (L)   ALT      0 - 50 U/L 12

## 2020-09-10 ENCOUNTER — TELEPHONE (OUTPATIENT)
Dept: ORTHOPEDICS | Facility: CLINIC | Age: 80
End: 2020-09-10

## 2020-09-10 NOTE — TELEPHONE ENCOUNTER
RN mycharted patient with Dr. See's plan.    Alex Siegel RN      ----- Message from Juan Diego See MD sent at 9/10/2020  6:06 AM CDT -----  Regarding: RE: R hip MRI  Dr. Maya,    She has grade 4 right hip arthritis, which is very severe, and would certainly be a major source of pain.  If she does not wish to undergo surgery she could see PT for exercises and could see our non-operative team for a right hip injection.  If she desires surgery she could see a surgeon to discuss options.    Kg, could you call her and offer the above options?  Thank you!  ----- Message -----  From: Zulma Lopez MD  Sent: 9/10/2020   1:32 AM CDT  To: Juan Diego See MD  Subject: R hip MRI                                        Hi Dr. See,    Would you please review the R hip MRI? Does she need hip replacement? She is in severe pain but prefers to avoid surgery.    Zulma  Rheum staff

## 2020-10-01 ENCOUNTER — HOSPITAL ENCOUNTER (EMERGENCY)
Facility: CLINIC | Age: 80
Discharge: HOME | End: 2020-10-01
Attending: EMERGENCY MEDICINE | Admitting: EMERGENCY MEDICINE
Payer: MEDICARE

## 2020-10-01 ENCOUNTER — APPOINTMENT (OUTPATIENT)
Dept: ULTRASOUND IMAGING | Facility: CLINIC | Age: 80
End: 2020-10-01
Attending: EMERGENCY MEDICINE
Payer: MEDICARE

## 2020-10-01 VITALS
RESPIRATION RATE: 16 BRPM | TEMPERATURE: 99.2 F | HEART RATE: 83 BPM | OXYGEN SATURATION: 93 % | HEIGHT: 67 IN | WEIGHT: 201 LBS | BODY MASS INDEX: 31.55 KG/M2 | SYSTOLIC BLOOD PRESSURE: 142 MMHG | DIASTOLIC BLOOD PRESSURE: 75 MMHG

## 2020-10-01 DIAGNOSIS — M25.572 PAIN IN JOINT, ANKLE AND FOOT, LEFT: ICD-10-CM

## 2020-10-01 LAB
ANION GAP SERPL CALCULATED.3IONS-SCNC: 9 MMOL/L (ref 3–14)
BASOPHILS # BLD AUTO: 0 10E9/L (ref 0–0.2)
BASOPHILS NFR BLD AUTO: 0.4 %
BUN SERPL-MCNC: 8 MG/DL (ref 7–30)
CALCIUM SERPL-MCNC: 9.7 MG/DL (ref 8.5–10.1)
CHLORIDE SERPL-SCNC: 105 MMOL/L (ref 94–109)
CO2 SERPL-SCNC: 24 MMOL/L (ref 20–32)
CREAT SERPL-MCNC: 0.71 MG/DL (ref 0.52–1.04)
CRP SERPL-MCNC: 110 MG/L (ref 0–8)
DIFFERENTIAL METHOD BLD: ABNORMAL
EOSINOPHIL # BLD AUTO: 0.1 10E9/L (ref 0–0.7)
EOSINOPHIL NFR BLD AUTO: 0.9 %
ERYTHROCYTE [DISTWIDTH] IN BLOOD BY AUTOMATED COUNT: 14.8 % (ref 10–15)
ERYTHROCYTE [SEDIMENTATION RATE] IN BLOOD BY WESTERGREN METHOD: 31 MM/H (ref 0–30)
GFR SERPL CREATININE-BSD FRML MDRD: 80 ML/MIN/{1.73_M2}
GLUCOSE BLDC GLUCOMTR-MCNC: 135 MG/DL (ref 70–99)
GLUCOSE SERPL-MCNC: 163 MG/DL (ref 70–99)
HCT VFR BLD AUTO: 39.6 % (ref 35–47)
HGB BLD-MCNC: 12.4 G/DL (ref 11.7–15.7)
IMM GRANULOCYTES # BLD: 0 10E9/L (ref 0–0.4)
IMM GRANULOCYTES NFR BLD: 0.5 %
LYMPHOCYTES # BLD AUTO: 0.8 10E9/L (ref 0.8–5.3)
LYMPHOCYTES NFR BLD AUTO: 9.9 %
MCH RBC QN AUTO: 29.1 PG (ref 26.5–33)
MCHC RBC AUTO-ENTMCNC: 31.3 G/DL (ref 31.5–36.5)
MCV RBC AUTO: 93 FL (ref 78–100)
MONOCYTES # BLD AUTO: 1.1 10E9/L (ref 0–1.3)
MONOCYTES NFR BLD AUTO: 14 %
NEUTROPHILS # BLD AUTO: 6.1 10E9/L (ref 1.6–8.3)
NEUTROPHILS NFR BLD AUTO: 74.3 %
NRBC # BLD AUTO: 0 10*3/UL
NRBC BLD AUTO-RTO: 0 /100
PLATELET # BLD AUTO: 199 10E9/L (ref 150–450)
POTASSIUM SERPL-SCNC: 3.2 MMOL/L (ref 3.4–5.3)
RBC # BLD AUTO: 4.26 10E12/L (ref 3.8–5.2)
SODIUM SERPL-SCNC: 138 MMOL/L (ref 133–144)
URATE SERPL-MCNC: 4.3 MG/DL (ref 2.6–6)
WBC # BLD AUTO: 8.2 10E9/L (ref 4–11)

## 2020-10-01 PROCEDURE — 86140 C-REACTIVE PROTEIN: CPT | Performed by: EMERGENCY MEDICINE

## 2020-10-01 PROCEDURE — 80048 BASIC METABOLIC PNL TOTAL CA: CPT | Performed by: EMERGENCY MEDICINE

## 2020-10-01 PROCEDURE — 85025 COMPLETE CBC W/AUTO DIFF WBC: CPT | Performed by: EMERGENCY MEDICINE

## 2020-10-01 PROCEDURE — 999N001017 HC STATISTIC GLUCOSE BY METER IP

## 2020-10-01 PROCEDURE — 84550 ASSAY OF BLOOD/URIC ACID: CPT | Performed by: EMERGENCY MEDICINE

## 2020-10-01 PROCEDURE — 258N000003 HC RX IP 258 OP 636: Performed by: EMERGENCY MEDICINE

## 2020-10-01 PROCEDURE — 96375 TX/PRO/DX INJ NEW DRUG ADDON: CPT | Performed by: EMERGENCY MEDICINE

## 2020-10-01 PROCEDURE — 250N000011 HC RX IP 250 OP 636: Performed by: EMERGENCY MEDICINE

## 2020-10-01 PROCEDURE — 85652 RBC SED RATE AUTOMATED: CPT | Performed by: EMERGENCY MEDICINE

## 2020-10-01 PROCEDURE — 99285 EMERGENCY DEPT VISIT HI MDM: CPT | Performed by: EMERGENCY MEDICINE

## 2020-10-01 PROCEDURE — 93971 EXTREMITY STUDY: CPT | Mod: LT

## 2020-10-01 PROCEDURE — 96361 HYDRATE IV INFUSION ADD-ON: CPT | Performed by: EMERGENCY MEDICINE

## 2020-10-01 PROCEDURE — 96374 THER/PROPH/DIAG INJ IV PUSH: CPT | Performed by: EMERGENCY MEDICINE

## 2020-10-01 PROCEDURE — 99285 EMERGENCY DEPT VISIT HI MDM: CPT | Mod: 25 | Performed by: EMERGENCY MEDICINE

## 2020-10-01 PROCEDURE — 93971 EXTREMITY STUDY: CPT | Mod: 26 | Performed by: RADIOLOGY

## 2020-10-01 RX ORDER — HYDROMORPHONE HYDROCHLORIDE 1 MG/ML
0.5 INJECTION, SOLUTION INTRAMUSCULAR; INTRAVENOUS; SUBCUTANEOUS ONCE
Status: COMPLETED | OUTPATIENT
Start: 2020-10-01 | End: 2020-10-01

## 2020-10-01 RX ORDER — ONDANSETRON 2 MG/ML
4 INJECTION INTRAMUSCULAR; INTRAVENOUS ONCE
Status: COMPLETED | OUTPATIENT
Start: 2020-10-01 | End: 2020-10-01

## 2020-10-01 RX ORDER — KETOROLAC TROMETHAMINE 15 MG/ML
15 INJECTION, SOLUTION INTRAMUSCULAR; INTRAVENOUS ONCE
Status: COMPLETED | OUTPATIENT
Start: 2020-10-01 | End: 2020-10-01

## 2020-10-01 RX ADMIN — KETOROLAC TROMETHAMINE 15 MG: 15 INJECTION, SOLUTION INTRAMUSCULAR; INTRAVENOUS at 21:04

## 2020-10-01 RX ADMIN — HYDROMORPHONE HYDROCHLORIDE 0.5 MG: 1 INJECTION, SOLUTION INTRAMUSCULAR; INTRAVENOUS; SUBCUTANEOUS at 20:17

## 2020-10-01 RX ADMIN — ONDANSETRON 4 MG: 2 INJECTION INTRAMUSCULAR; INTRAVENOUS at 20:24

## 2020-10-01 RX ADMIN — SODIUM CHLORIDE 500 ML: 9 INJECTION, SOLUTION INTRAVENOUS at 20:17

## 2020-10-01 ASSESSMENT — ENCOUNTER SYMPTOMS
PALPITATIONS: 0
CHILLS: 1
COUGH: 0
APPETITE CHANGE: 1

## 2020-10-01 ASSESSMENT — MIFFLIN-ST. JEOR: SCORE: 1414.36

## 2020-10-01 NOTE — ED AVS SNAPSHOT
[facility] Emergency Department  [address] [address]  Phone: [phone]                                    Betty Tee   MRN: [id number]    Department: [facility] Emergency Department   Date of Visit: 10/1/2020           After Visit Summary Signature Page    I have received my discharge instructions, and my questions have been answered. I have discussed any challenges I see with this plan with the nurse or doctor.    ..........................................................................................................................................  Patient/Patient Representative Signature      ..........................................................................................................................................  Patient Representative Print Name and Relationship to Patient    ..................................................               ................................................  Date                                   Time    ..........................................................................................................................................  Reviewed by Signature/Title    ...................................................              ..............................................  Date                                               Time          22EPIC Rev 08/18

## 2020-10-01 NOTE — ED PROVIDER NOTES
ED Provider Note  [facility]      History     Chief Complaint   Patient presents with     Foot Pain     The history is provided by the patient and medical records.     Betty Tee is a 80 year old female with a past medical history significant for atrial fibrillation on Xarelto, inflammatory joint disease, hypertension, hyperlipidemia, Sjogren's syndrome, heart failure, type 2 diabetes mellitus and CKD stage 3 who presents to the ED for evaluation of left foot pain and swelling. Two weeks ago, the patient reports an onset of left foot pain and swelling. Over the last 3-4 days, she states that the pain has been worsening and she is no longer able to bear weight on the foot. The patient endorses a decreased appetite over the last few days because she has been feeling unwell. The patient states that the leg is not warm or erythematous. Today, the patient reports vomiting bile with associated chills. The patient has not taken her medications today as well. In the ED, her left lower extremity is now erythematous and warm to the touch. She denies any recent injuries or trauma. She denies any history of gout but she did states that she has inflammatory joint disease. Due to this she was taking larger amounts of prednisone but has now decreased to 5 mg of prednisone as of a few weeks ago.     Past Medical History  Past Medical History:   Diagnosis Date     Alcohol abuse, in remission      Allergic rhinitis, cause unspecified     allegra helps when she takes it     Antiplatelet or antithrombotic long-term use      Atrial fibrillation (H)     in hosp in 11/11 after surgery w/ fluid overload     Cardiomegaly     LVH on stress echo- cardiac w/u at [facility]- neg CT scan for PE, neg stress echo in 8/06     Chest pain, unspecified      Disorder of bone and cartilage, unspecified     osteopenia (had been on prempro), improved on 6/06 dexa, stable dexa 11/10     Diverticulosis of colon (without  mention of hemorrhage)     last episode yrs ago     Essential hypertension, benign      Follicular bronchiolitis (H)     associated with Sjogrens, dx by chest CT showing mosaic attenuation and air trapping     Gastro-oesophageal reflux disease      ILD (interstitial lung disease) (H)     associated with Sjogrens, also has mildly elevated IgG4, first noted on chest CT 2015 (mild changes) and also has small airways disease; ILD improved on follow up chest CT 2018.     Insomnia, unspecified     weaned off clonazepam     Irregular heart beat      Lumbago 7/09    MRI with NEAL, now seeing Dr. Cain for sciatic sx's     Major depressive disorder, recurrent episode, moderate (H)      Obstructive sleep apnea     uses cpap     Osteoarthrosis, unspecified whether generalized or localized, unspecified site      Sjogren's syndrome (H)     + RG and SSA and lip bx     Sleep apnea      Tobacco use disorder     chantix in 9/07, started again in 6/08, working     Past Surgical History:   Procedure Laterality Date     BACK SURGERY  1962     BIOPSY BREAST  9/27/02    Biopsy Left Breast     C APPENDECTOMY  1970's?     C NONSPECIFIC PROCEDURE  11/05    exploratory abd lap, adhesions, resolved RLQ pain, diverticulitis episodes     CARDIAC SURGERY       CHOLECYSTECTOMY  1990's?     COLECTOMY LEFT  11/7/2011    Procedure:COLECTOMY LEFT; Laparoscopic mobilization of splenic flexture, sigmoid colectomy, coloprotoscopy, loop illeostomy; Surgeon:CK CASTANEDA; Location:[facility]     HYSTERECTOMY TOTAL ABDOMINAL, BILATERAL SALPINGO-OOPHORECTOMY, COMBINED  11/7/2011    Procedure:COMBINED HYSTERECTOMY TOTAL ABDOMINAL, BILATERAL SALPINGO-OOPHORECTOMY; total abdominal hysterectomy, bilateral salpingo-oophorectomy; Surgeon:ALETA MANUEL; Location:[facility]     INSERT STENT URETER  11/7/2011    Procedure:INSERT STENT URETER; Placement of Bilateral Ureteral Stents ; Surgeon:PRANEETH BRYANT; Location:[facility]     SIGMOIDOSCOPY FLEXIBLE  11/3/2011     Procedure:SIGMOIDOSCOPY FLEXIBLE; Flexible Sigmoidoscopy; Surgeon:CK CASTANEDA; Location:[facility]     TAKEDOWN ILEOSTOMY  2/1/2012    Procedure:TAKEDOWN ILEOSTOMY; Takedown Loop Ileostomy ; Surgeon:CK CASTANEDA; Location:[facility] OR          acetaminophen (TYLENOL) 500 MG tablet       acyclovir (ZOVIRAX) 5 % external ointment       albuterol (PROAIR HFA, PROVENTIL HFA, VENTOLIN HFA) 108 (90 BASE) MCG/ACT inhaler       alendronate (FOSAMAX) 70 MG tablet       atorvastatin (LIPITOR) 10 MG tablet       azithromycin (ZITHROMAX) 250 MG tablet       CEVIMELINE 30 MG PO capsule       escitalopram (LEXAPRO) 20 MG tablet       fluticasone (FLONASE) 50 MCG/ACT nasal spray       fluticasone-vilanterol (BREO ELLIPTA) 100-25 MCG/INH inhaler       gabapentin (NEURONTIN) 300 MG capsule       HYDROcodone-acetaminophen (NORCO) 7.5-325 MG per tablet       hydroxychloroquine (PLAQUENIL) 200 MG tablet       insulin glargine (BASAGLAR KWIKPEN) 100 UNIT/ML pen       lidocaine (LIDODERM) 5 % patch       loratadine (CLARITIN) 10 MG tablet       metoprolol succinate ER (TOPROL-XL) 25 MG 24 hr tablet       metoprolol succinate ER (TOPROL-XL) 50 MG 24 hr tablet       montelukast (SINGULAIR) 10 MG tablet       NOVOLOG FLEXPEN 100 UNIT/ML soln       OZEMPIC, 0.25 OR 0.5 MG/DOSE, 2 MG/1.5ML SOPN       potassium chloride ER (KLOR-CON) 20 MEQ CR tablet       predniSONE (DELTASONE) 1 MG tablet       rivaroxaban ANTICOAGULANT (XARELTO ANTICOAGULANT) 20 MG PO TABS tablet       spironolactone (ALDACTONE) 25 MG tablet       torsemide (DEMADEX) 10 MG tablet       torsemide (DEMADEX) 20 MG tablet       traZODone (DESYREL) 50 MG tablet       vitamin D3 (CHOLECALCIFEROL) 2000 units (50 mcg) tablet      Allergies   Allergen Reactions     Amoxicillin-Pot Clavulanate      Augmentin Nausea and Vomiting     Codeine Nausea and Vomiting     Codeine      PN: LW Reaction: HIVES     Penicillins Nausea and Vomiting     PN: LW Reaction: GI Upset     Phenobarbital  Itching     Phenobarbital      Seasonal Allergies      Family History  Family History   Problem Relation Age of Onset     C.A.D. Mother 63        MI- first at age 63     Heart Disease Mother      Hypertension Mother      Cerebrovascular Disease Mother      Hyperlipidemia Mother      Alcohol/Drug Father      Alzheimer Disease Father      Dementia Father      Hypertension Father      Hyperlipidemia Father      Diabetes Sister      C.A.D. Sister 52        Minor MI- age 50's     Heart Disease Sister      Hypertension Sister      Hypertension Sister      Hypertension Brother      Cancer - colorectal Sister 48        Late 40's early 50's     Prostate Cancer Brother 74        Dx'd age 74     Gastrointestinal Disease Sister         Diverticulitis     Gastrointestinal Disease Brother         Diverticulitis     Lipids Sister      Lipids Sister      Parkinsonism Brother      Diabetes Sister      Heart Disease Sister         CHF     Cancer Sister         lung, smoker     Substance Abuse Sister      Substance Abuse Brother      Asthma Sister      Cancer Sister      Breast Cancer Daughter      Prostate Cancer Brother      Hyperlipidemia Brother      Diabetes Other      Hypertension Other      Social History   Social History     Tobacco Use     Smoking status: Former Smoker     Packs/day: 0.50     Years: 10.00     Pack years: 5.00     Types: Cigarettes     Quit date: 2011     Years since quittin.1     Smokeless tobacco: Never Used     Tobacco comment:  ppd   Substance Use Topics     Alcohol use: No     Alcohol/week: 0.0 standard drinks     Comment: In recovery beginning      Drug use: No      Past medical history, past surgical history, medications, allergies, family history, and social history were reviewed with the patient. No additional pertinent items.       Review of Systems   Constitutional: Positive for appetite change (Decreased) and chills.   Respiratory: Negative for cough.    Cardiovascular: Negative  "for chest pain and palpitations.   Musculoskeletal:        Positive for left lower extremity pain and swelling.   Skin: Color change: Erythematous.        Positive for feeling warm upon palpation of left lower extremity.   All other systems reviewed and are negative.    Physical Exam   BP: (!) 152/65  Pulse: 75  Temp: 99.2  F (37.3  C)  Resp: 16  Height: 170.2 cm (5' 7\")  Weight: 91.2 kg (201 lb)  SpO2: 100 %  Physical Exam  Constitutional:       General: She is not in acute distress.     Appearance: She is not diaphoretic.   HENT:      Head: Atraumatic.      Mouth/Throat:      Pharynx: No oropharyngeal exudate.   Eyes:      General: No scleral icterus.     Pupils: Pupils are equal, round, and reactive to light.   Cardiovascular:      Rate and Rhythm: Normal rate and regular rhythm.      Heart sounds: Normal heart sounds.   Pulmonary:      Effort: No respiratory distress.      Breath sounds: Normal breath sounds.   Abdominal:      General: Bowel sounds are normal.      Palpations: Abdomen is soft.      Tenderness: There is no abdominal tenderness.   Musculoskeletal:         General: No tenderness.   Skin:     General: Skin is warm.      Capillary Refill: Capillary refill takes less than 2 seconds.      Findings: No rash.         ED Course      Procedures   7:11 PM  The patient was seen and examined by Yury Anne DO in Room ED16.                          Results for orders placed or performed during the hospital encounter of 10/01/20   CBC with platelets differential     Status: Abnormal   Result Value Ref Range    WBC 8.2 4.0 - 11.0 10e9/L    RBC Count 4.26 3.8 - 5.2 10e12/L    Hemoglobin 12.4 11.7 - 15.7 g/dL    Hematocrit 39.6 35.0 - 47.0 %    MCV 93 78 - 100 fl    MCH 29.1 26.5 - 33.0 pg    MCHC 31.3 (L) 31.5 - 36.5 g/dL    RDW 14.8 10.0 - 15.0 %    Platelet Count 199 150 - 450 10e9/L    Diff Method Automated Method     % Neutrophils 74.3 %    % Lymphocytes 9.9 %    % Monocytes 14.0 %    % Eosinophils 0.9 % "    % Basophils 0.4 %    % Immature Granulocytes 0.5 %    Nucleated RBCs 0 0 /100    Absolute Neutrophil 6.1 1.6 - 8.3 10e9/L    Absolute Lymphocytes 0.8 0.8 - 5.3 10e9/L    Absolute Monocytes 1.1 0.0 - 1.3 10e9/L    Absolute Eosinophils 0.1 0.0 - 0.7 10e9/L    Absolute Basophils 0.0 0.0 - 0.2 10e9/L    Abs Immature Granulocytes 0.0 0 - 0.4 10e9/L    Absolute Nucleated RBC 0.0    Basic metabolic panel     Status: Abnormal   Result Value Ref Range    Sodium 138 133 - 144 mmol/L    Potassium 3.2 (L) 3.4 - 5.3 mmol/L    Chloride 105 94 - 109 mmol/L    Carbon Dioxide 24 20 - 32 mmol/L    Anion Gap 9 3 - 14 mmol/L    Glucose 163 (H) 70 - 99 mg/dL    Urea Nitrogen 8 7 - 30 mg/dL    Creatinine 0.71 0.52 - 1.04 mg/dL    GFR Estimate 80 >60 mL/min/[1.73_m2]    GFR Estimate If Black >90 >60 mL/min/[1.73_m2]    Calcium 9.7 8.5 - 10.1 mg/dL     Medications - No data to display     Assessments & Plan (with Medical Decision Making)     8-year-old female past medical surgical history as above here with pain to left ankle, could be gout, DVT possible given calf tenderness, less likely cellulitis given duration however report of fever chills 1 to the vomiting is concerning.  Will obtain labs, DVT ultrasound, then reassess.  Also states she recently came off a round of steroids and that is when symptoms started, when she was down titrated to 5 mg daily.    I have reviewed the nursing notes. I have reviewed the findings, diagnosis, plan and need for follow up with the patient.    New Prescriptions    No medications on file       Final diagnoses:   None       --  I, Mayank Silva, am serving as a trained medical scribe to document services personally performed by Yury Anne MD, based on the provider's statements to me.     Yury SHERIDAN MD, was physically present and have reviewed and verified the accuracy of this note documented by Mayank Silva.    Yury Anne MD  [facility] EMERGENCY DEPARTMENT  10/1/2020      Yury Anne MD  10/01/20 2007

## 2020-10-01 NOTE — ED TRIAGE NOTES
Pt presents to ED with c/o L foot swelling and pain. Started 2 weeks ago. Denies recent injury or scrape.

## 2020-10-02 ENCOUNTER — TELEPHONE (OUTPATIENT)
Dept: RHEUMATOLOGY | Facility: CLINIC | Age: 80
End: 2020-10-02

## 2020-10-02 DIAGNOSIS — M10.9 GOUT: ICD-10-CM

## 2020-10-02 DIAGNOSIS — M19.90 INFLAMMATORY ARTHRITIS: Primary | ICD-10-CM

## 2020-10-02 RX ORDER — PREDNISONE 5 MG/1
5 TABLET ORAL DAILY
Qty: 30 TABLET | Refills: 2 | Status: SHIPPED | OUTPATIENT
Start: 2020-10-02 | End: 2020-10-29

## 2020-10-02 RX ORDER — PREDNISONE 10 MG/1
TABLET ORAL
Qty: 30 TABLET | Refills: 0 | Status: SHIPPED | OUTPATIENT
Start: 2020-10-02 | End: 2020-10-29

## 2020-10-02 NOTE — DISCHARGE INSTRUCTIONS
Take 400-600mg of Ibuprofen (Motrin) every 6 hours for pain. Don't do this for more than a week. Come back if you develop any fever (100.4 or higher) or if you just feel worse.

## 2020-10-02 NOTE — TELEPHONE ENCOUNTER
----- Message from Zulma Lopez MD sent at 10/1/2020 10:27 PM CDT -----  Regarding: RE: Sister Sita  She has no follow up scheduled with me. I saw the picture (thank you for taking that!), looks like gout/pseudogout. Sometimes serum uric drops during gut flare. Ideally arthrocentesis is needed to rule out septic arthritis nd to see crystals but since she refused, recommend prednisone 40-30-20-10 mg a day each for 3 days then 5 mg every day. If does not get better by Sunday or develops fevers, has to return to ED for arthrocentesis r/o septic arthritis.    Kamille could you plz call her with above plan?    Thank you both,    Zulma  ----- Message -----  From: Yury Anne MD  Sent: 10/1/2020  10:15 PM CDT  To: Zulma Lopez MD  Subject: Sister Sita                                  Dr. Lopez,  I am emailing you because you are Sister Sita's rheumatologist and I think you should have an e-visit with her tomorrow (10/2). She came to the ER for evaluation of left ankle pain for two weeks and got significantly worse today.  It is red and swollen. She told me that because her Internet connection is poor she usually get a phone eval, so I took a picture of it and you should be able to find it in the media section of EPIC.  Her uric acid was unremarkable, however I know that cannot rule out gout but I doubt she would have that as a new diagnosis at her age. I offered arthrocentesis to rule out gout/pseudogout/septic joint but despite several reassurances that I would minimize any pain, she wasn't interested. She did not tolerate a DVT ultrasound (unfortunately they took her there before she got her pain medicine), but the pain and swelling is so localized to her ankle I doubt it and even if it was, she is on the appropriate medication for that.  I did recommend she take Motrin for her pain as ketorolac made her pain much better and she already takes small doses of narcotics.  I understand this may  not be ideal given her cardiac history but I think a short dose is appropriate.    Thanks,  Raj (Dr. Anne)

## 2020-10-02 NOTE — TELEPHONE ENCOUNTER
Called and spoke with pt, she is continuing to not feel well. Is vomiting and nauseated this morning.  She still would prefer to not have the joint aspirated.  Discussed prednisone taper with her and Yvette, her roommate.  Yvette was doing most of the listening due to how poorly pt was feeling. Will send prescription to pharmacy.     We discussed that if pt starts having fevers or the pain is not better by Sunday, she needs to go back to ER for an arthrocentesis.  Pt and her roommate expressed understanding.   Pt did ask me to call her other friend who helps with her medical issues.      Sister Nghia called back. Was able to discuss in further detail, the plan.  She understands prednisone taper and when to return to the ER.  All questions answered to the best of my ability.    Kamille Ruffin RN  Rheumatology Clinic

## 2020-10-04 DIAGNOSIS — M35.02 SJOGREN'S SYNDROME WITH LUNG INVOLVEMENT (H): ICD-10-CM

## 2020-10-04 NOTE — LETTER
Betty Tee  [address]  [place] [address]    Dear Betty Tee,     Regular eye exams are required while taking hydroxychloroquine (Plaquenil). Eye exams should be completed by an eye specialist who is experienced in monitoring for hydroxychloroquine toxicity (a rare effect of the drug that can damage your eyes and vision).  These may be yearly or as determined by your eye specialist.     Although vision problems and loss of sight while taking hydroxychloroquine are very rare, notify your doctor immediately if you notice changes in your vision. The goal of screening is to detect toxicity before your vision is significantly or noticeably impacted. Failing to get proper screening exams puts you at risk of vision changes which may or may not be reversible.    Per the American Academy of Ophthalmology recommendations (2016), screening tests performed may include a 10-2 visual field test, spectral-domain optical coherence tomography (SD OCT), or other screening tests as determined by the eye specialist, including a multifocal electroretinogram (mfERG) or fundus auto-fluorescence (FAF).    We received a refill request from your pharmacy. A copy of your current eye exam was not found in your medical record. Your hydroxychloroquine prescription has been refilled with a limited supply pending confirmation you have had an eye exam to test for hydroxychloroquine toxicity.      We encourage you to bring this letter to your eye exam to discuss the exams that will be performed during your visit. Please request your eye clinic fax or mail a copy of your eye exam report to our clinic indicating that testing was completed for hydroxychloroquine toxicity screening. The exam notes must specifically comment on the potential for hydroxychloroquine toxicity and outline recommended follow-up.    If you have questions about hydroxychloroquine or the information in this letter, please call the clinic at [phone]  or talk to your provider at your next office visit.                        2    Eye Specialist Letter  Dear Eye Specialist,  To ensure safe use of Plaquenil (hydroxychloroquine), we are requesting your assistance in providing the following information. Please return this form to our clinic via mail or fax, or incorporate this information into your visit summary. Your note must indicate whether or not there is evidence of toxicity from Plaquenil use. For questions regarding this form, please call [phone].  Patient Name:   :             Date of Exam:    The following exams were completed during this visit in accordance with the American Academy of Ophthalmology recommendations (2016):  ? 10-2 automated visual field  ? Spectral-domain optical coherence tomography (SD OCT)  ? Multifocal electroretinogram (mfERG)  ? Fundus autofluorescence (FAF)  ? Other (please specify)  Please select from the following:  ? The findings from the above exams are not suggestive of toxicity from Plaquenil (hydroxychloroquine).  ? The findings from the above exams may suggest toxicity from Plaquenil (hydroxychloroquine).  Directly contact the clinic and fax this form.  Please provide additional guidance on whether or not the medication may be continued from your perspective:  Date of next recommended Plaquenil (hydroxychloroquine) screening eye exam:   ? 5 years  ? 1 year  ? 6 months  Other (please specify):   Eye Specialist Name (print):                                                                Date:  Eye Specialist Signature:

## 2020-10-05 NOTE — TELEPHONE ENCOUNTER
Sister Nghia called in today, pt woke this morning and her foot is hot and burning and the sensation is traveling up her leg.  When she puts her foot down it gets worse, but when she keeps it eleveated, it is better.  Foot is not hot to touch, swelling is down and color is coming back.  It is actually better.  She is starting to be able to move it more.      It has not gotten worse overnight as reported, things are much improved.    Kamlile Ruffin RN  Rheumatology Clinic

## 2020-10-05 NOTE — TELEPHONE ENCOUNTER
On Saturday and Sunday, temp was normal.  Pt did take first dose of prednisone on Friday afternoon, did start to feel better after napping.  Swelling was better that evening.  Sunday afternoon, she actually felt well enough to go out.  She is able to wiggle her toes on her foot.      Currently she has now a short stabbing pain in the night. It will go away on its own.  She is starting the prednisone 30 mg a day today.    Sister Nghia, did mention to her that maybe she should look at going into be seen sooner next time, and not waiting until the pain is that bad.    Kamille Ruffin RN  Rheumatology Clinic

## 2020-10-06 DIAGNOSIS — M54.16 LUMBAR BACK PAIN WITH RADICULOPATHY AFFECTING RIGHT LOWER EXTREMITY: ICD-10-CM

## 2020-10-06 RX ORDER — GABAPENTIN 300 MG/1
300 CAPSULE ORAL 3 TIMES DAILY
Qty: 270 CAPSULE | Refills: 0 | Status: SHIPPED | OUTPATIENT
Start: 2020-10-06 | End: 2021-01-12

## 2020-10-08 RX ORDER — HYDROXYCHLOROQUINE SULFATE 200 MG/1
400 TABLET, FILM COATED ORAL DAILY
Qty: 180 TABLET | Refills: 0 | Status: SHIPPED | OUTPATIENT
Start: 2020-10-08 | End: 2021-01-14

## 2020-10-08 NOTE — TELEPHONE ENCOUNTER
Plaquenil      Last Written Prescription Date:  3/23/20  Last Fill Quantity: 180,   # refills: 1  Last Virtual Visit: 9/9/20  Future Office visit: 12/11/20  Last Eye Exam:10/7/19  Nothing more recent in care everywhere nor media. No upcoming eye appointments scheduled    Routing refill request to provider for review/approval because:  No record of recent eye exam in EPIC - Letter sent today.

## 2020-10-15 ENCOUNTER — TELEPHONE (OUTPATIENT)
Dept: RHEUMATOLOGY | Facility: CLINIC | Age: 80
End: 2020-10-15

## 2020-10-15 DIAGNOSIS — M19.90 INFLAMMATORY ARTHRITIS: Primary | ICD-10-CM

## 2020-10-15 RX ORDER — PREDNISONE 10 MG/1
TABLET ORAL
Qty: 30 TABLET | Refills: 0 | Status: SHIPPED | OUTPATIENT
Start: 2020-10-15 | End: 2020-10-30

## 2020-10-15 NOTE — TELEPHONE ENCOUNTER
Called and spoke to pt.  Her ankle is tight and little bit swollen but not as painful as last time.   She is in the 20 mg phase of her taper and is just concerned as her ankle is starting to swell again after it had gone back to normal.    Will have on call provider review and advise.    Kamille Ruffin RN  Rheumatology Clinic

## 2020-10-15 NOTE — TELEPHONE ENCOUNTER
[facility] Call Center    Phone Message    May a detailed message be left on voicemail: yes     Reason for Call: Other: The pt has been taking 20 predisone.  The pt's L foot started swelling like before on 10.12. Should she go back to taking 40 mg? Please call the pt to discuss. Thanks.    Action Taken: Message routed to:  [facility]    Travel Screening: Not Applicable

## 2020-10-15 NOTE — TELEPHONE ENCOUNTER
Jose Cabrera MD Beard, Madeline, RN   Caller: Unspecified (Today, 12:02 PM)             With trouble with the taper rate, can go back up to 30mg daily (previously effective dose) and then stay on this for 5 days, then 20mg daily for 5 days, then 10mg daily for 5 days, then back down to baseline 5mg daily. New script entered for this taper.      Called and updated pt with plan.  She is in agreement.  All questions answered.    Kamille Ruffin RN  Rheumatology Clinic

## 2020-10-20 ENCOUNTER — OFFICE VISIT (OUTPATIENT)
Dept: FAMILY MEDICINE | Facility: CLINIC | Age: 80
End: 2020-10-20
Payer: MEDICARE

## 2020-10-20 ENCOUNTER — OFFICE VISIT (OUTPATIENT)
Dept: PHARMACY | Facility: CLINIC | Age: 80
End: 2020-10-20
Payer: COMMERCIAL

## 2020-10-20 VITALS
DIASTOLIC BLOOD PRESSURE: 81 MMHG | HEIGHT: 67 IN | HEART RATE: 52 BPM | WEIGHT: 201 LBS | SYSTOLIC BLOOD PRESSURE: 124 MMHG | TEMPERATURE: 97.4 F | BODY MASS INDEX: 31.55 KG/M2 | OXYGEN SATURATION: 98 %

## 2020-10-20 DIAGNOSIS — Z79.4 TYPE 2 DIABETES MELLITUS WITH HYPERGLYCEMIA, WITH LONG-TERM CURRENT USE OF INSULIN (H): Primary | ICD-10-CM

## 2020-10-20 DIAGNOSIS — J44.89 FOLLICULAR BRONCHIOLITIS (H): ICD-10-CM

## 2020-10-20 DIAGNOSIS — M19.90 INFLAMMATORY ARTHRITIS: ICD-10-CM

## 2020-10-20 DIAGNOSIS — Z79.4 DIABETES MELLITUS DUE TO UNDERLYING CONDITION WITH HYPERGLYCEMIA, WITH LONG-TERM CURRENT USE OF INSULIN (H): ICD-10-CM

## 2020-10-20 DIAGNOSIS — E11.65 TYPE 2 DIABETES MELLITUS WITH HYPERGLYCEMIA, WITH LONG-TERM CURRENT USE OF INSULIN (H): Primary | ICD-10-CM

## 2020-10-20 DIAGNOSIS — N18.30 STAGE 3 CHRONIC KIDNEY DISEASE, UNSPECIFIED WHETHER STAGE 3A OR 3B CKD (H): ICD-10-CM

## 2020-10-20 DIAGNOSIS — E78.5 HYPERLIPIDEMIA LDL GOAL <100: ICD-10-CM

## 2020-10-20 DIAGNOSIS — E08.65 DIABETES MELLITUS DUE TO UNDERLYING CONDITION WITH HYPERGLYCEMIA, WITH LONG-TERM CURRENT USE OF INSULIN (H): ICD-10-CM

## 2020-10-20 DIAGNOSIS — I10 ESSENTIAL HYPERTENSION WITH GOAL BLOOD PRESSURE LESS THAN 140/90: ICD-10-CM

## 2020-10-20 LAB
ANION GAP SERPL CALCULATED.3IONS-SCNC: 6 MMOL/L (ref 3–14)
BUN SERPL-MCNC: 23 MG/DL (ref 7–30)
CALCIUM SERPL-MCNC: 9.7 MG/DL (ref 8.5–10.1)
CHLORIDE SERPL-SCNC: 105 MMOL/L (ref 94–109)
CHOLEST SERPL-MCNC: 115 MG/DL
CO2 SERPL-SCNC: 27 MMOL/L (ref 20–32)
CREAT SERPL-MCNC: 1.16 MG/DL (ref 0.52–1.04)
CREAT UR-MCNC: 127 MG/DL
GFR SERPL CREATININE-BSD FRML MDRD: 44 ML/MIN/{1.73_M2}
GLUCOSE SERPL-MCNC: 155 MG/DL (ref 70–99)
HBA1C MFR BLD: 8.7 % (ref 0–5.6)
HDLC SERPL-MCNC: 71 MG/DL
LDLC SERPL CALC-MCNC: 19 MG/DL
MICROALBUMIN UR-MCNC: 52 MG/L
MICROALBUMIN/CREAT UR: 40.55 MG/G CR (ref 0–25)
NONHDLC SERPL-MCNC: 44 MG/DL
POTASSIUM SERPL-SCNC: 3.4 MMOL/L (ref 3.4–5.3)
SODIUM SERPL-SCNC: 138 MMOL/L (ref 133–144)
TRIGL SERPL-MCNC: 125 MG/DL

## 2020-10-20 PROCEDURE — 80061 LIPID PANEL: CPT | Performed by: FAMILY MEDICINE

## 2020-10-20 PROCEDURE — 82043 UR ALBUMIN QUANTITATIVE: CPT | Performed by: FAMILY MEDICINE

## 2020-10-20 PROCEDURE — 90662 IIV NO PRSV INCREASED AG IM: CPT | Performed by: FAMILY MEDICINE

## 2020-10-20 PROCEDURE — 83036 HEMOGLOBIN GLYCOSYLATED A1C: CPT | Performed by: FAMILY MEDICINE

## 2020-10-20 PROCEDURE — 80048 BASIC METABOLIC PNL TOTAL CA: CPT | Performed by: FAMILY MEDICINE

## 2020-10-20 PROCEDURE — 36415 COLL VENOUS BLD VENIPUNCTURE: CPT | Performed by: FAMILY MEDICINE

## 2020-10-20 PROCEDURE — 99214 OFFICE O/P EST MOD 30 MIN: CPT | Mod: 25 | Performed by: FAMILY MEDICINE

## 2020-10-20 PROCEDURE — 99606 MTMS BY PHARM EST 15 MIN: CPT | Performed by: PHARMACIST

## 2020-10-20 PROCEDURE — 99607 MTMS BY PHARM ADDL 15 MIN: CPT | Performed by: PHARMACIST

## 2020-10-20 PROCEDURE — G0008 ADMIN INFLUENZA VIRUS VAC: HCPCS | Performed by: FAMILY MEDICINE

## 2020-10-20 RX ORDER — SEMAGLUTIDE 1.34 MG/ML
1 INJECTION, SOLUTION SUBCUTANEOUS
Qty: 3 ML | Refills: 1 | Status: SHIPPED | OUTPATIENT
Start: 2020-10-20 | End: 2020-12-04

## 2020-10-20 ASSESSMENT — MIFFLIN-ST. JEOR: SCORE: 1414.36

## 2020-10-20 NOTE — PATIENT INSTRUCTIONS
Eye Exam scheduling  They can fax results to us once your visit is complete  [phone]    [facility]  [address] [place] [address]  [phone]    [facility]  [address] [facility] [address] [place] [address]  [phone]

## 2020-10-20 NOTE — PROGRESS NOTES
"MTM ENCOUNTER  SUBJECTIVE/OBJECTIVE:                           Betty Tee is a 80 year old female coming in for a follow-up visit. She was referred to me from Dr. Tristan, whom she saw immediately prior to our visit today.     Chief Complaint: Today's visit is a follow-up MTM visit from 20, but the primary focus of today is her elevated BG after recent prednisone start.     Allergies/ADRs: Reviewed in chart  Tobacco: She reports that she quit smoking about 9 years ago. Her smoking use included cigarettes. She has a 5.00 pack-year smoking history. She has never used smokeless tobacco.  Alcohol: not currently using    Medication Adherence/Access: no issues reported    Gout/Pseudogout: Started prednisone taper on 10/2 at 40mg decreasing by 10mg every 3 days and then got down to 5mg daily and her foot \"blew up again\" (this was on 10/16). Rheumatology increased Prednisone back to 30mg and slowed the taper to a 10mg decrease every 5 days. She has only had two nights where she had difficulty sleeping, but otherwise doing OK. Endorses hunger, irritability, and increased glucoses as SE. Currently her foot is feeling pretty good, was able to get a sock and shoe on today with minimal discomfort, there is minimal swelling.     Diabetes:  Pt currently taking Ozempic 0.5mg weekly, Novolog 6 units three times a day with meals, Basaglar 22 units daily. Pt is not experiencing side effects.   SMBG: three times daily.  Glucose readings below are per her report, does not have meter with her today.   Mornin - 154   Noon: high 200's  Night; 300-400's.   Patient is not experiencing hypoglycemia  Recent symptoms of high blood sugar? She's always thirsty, so difficult to tell if this has changed from baseline. She always drinks a lot of water so she urinates a lot, again, difficult to tell if this is a change from baseline. She is more hungry (again, could be due to prednisone).     BP Readings from Last 1 Encounters: "   10/20/20 124/81     Wt Readings from Last 2 Encounters:   10/20/20 201 lb (91.2 kg)   10/01/20 201 lb (91.2 kg)       ASSESSMENT:                            Medication Adherence: No issues identified    Gout/Pseudogout:  Plan in place to taper prednisone and f/u with rheumatology.     Diabetes:  Discussed options - She is open to increasing Ozempic to help with weight loss and to try to curb her appetite.  However, this likely will not be enough to get her sugars back to the normal range.  Additionally she may benefit from a flexible Novolog dosing schedule to help with the prednisone-induced high blood sugars.     PLAN:                            1. Keep taking Novolog 6 units three times daily and also add sliding scale  B-199: 1 unit,   200-249: 2 units,   250-299: 3 units,   300-349: 4 units,   >350: 5 units    2. Increase Ozempic to 1mg once a week. If you want you can finish up your current ozempic supply by taking 2 shots of 0.5mg on the same day. I sent a new prescription for the 1mg pens to your pharmacy.     I spent 30 minutes with this patient today. All changes were made via collaborative practice agreement with Dr. Tristan. A copy of the visit note was provided to the patient's primary care provider.    Will follow up on Thursday for SMBG check.     The patient was given a summary of these recommendations.     Sabrina Meek, Pharm.D., M.B.A., BCACP  MT Pharmacist, [facility]  Pager: [phone]  Email: [email or web address]

## 2020-10-20 NOTE — PROGRESS NOTES
Subjective     Betty Tee is a 80 year old female who presents to clinic today for the following health issues:    HPI          Diabetes Follow-up  How often are you checking your blood sugar? Three times daily  Blood sugar testing frequency justification:  Adjustment of medication(s) prednisone since October 2nd   What time of day are you checking your blood sugars (select all that apply)?  Before meals  Have you had any blood sugars above 200?  Yes   Have you had any blood sugars below 70?  No    What symptoms do you notice when your blood sugar is low?  None    What concerns do you have today about your diabetes? Blood sugar is often over 200, managing the higher readings      Do you have any of these symptoms? (Select all that apply)  Redness, sores, or blisters on feet left feet problems     Have you had a diabetic eye exam in the last 12 months? No but would like [place] or [facility] eye clinic    A1C up to 8.7 - from 6.7.    Increased dose of prednisone since 10/2/20- tapering dose from 40mg/d, but then went back up to 30mg/d from 10/16.  Still at 30mg today, starts 20mg/day.  The prednisone is really helped her left ankle massive swelling.  Rheumatology directing.    Glucose went up to 400 - and she took another 6 units of insulin, and her level went down to 300s. She didn't feel bad at the 400.  She doesn't have a sliding scale.    She went to lab prior to our visits today- and got lipids checked today, as well as recheck of BMP.      Other updates-   -Flu shot today.    -Also due for Tdap- would like to hold offff.      --CHF/breathing-   Hard time with the mask, breathing is tough.      --R groin pain- told she needs a hip replacement.  Dr. See, hasn't tried an injection.  Rheumatology agrees.      BP Readings from Last 2 Encounters:   10/20/20 124/81   10/01/20 (!) 142/75     Hemoglobin A1C (%)   Date Value   10/20/2020 8.7 (H)   06/19/2020 6.8 (H)     LDL Cholesterol Calculated (mg/dL)   Date  "Value   10/20/2020 19   01/21/2020 1           How many servings of fruits and vegetables do you eat daily?  2-3    On average, how many sweetened beverages do you drink each day (Examples: soda, juice, sweet tea, etc.  Do NOT count diet or artificially sweetened beverages)?   0    How many days per week do you exercise enough to make your heart beat faster? 3 or less    How many minutes a day do you exercise enough to make your heart beat faster? 9 or less    How many days per week do you miss taking your medication? 0        Review of Systems   Constitutional, HEENT, cardiovascular, pulmonary, gi and gu systems are negative, except as otherwise noted.      Objective    /81   Pulse 52   Temp 97.4  F (36.3  C) (Tympanic)   Ht 1.702 m (5' 7\")   Wt 91.2 kg (201 lb)   SpO2 98%   BMI 31.48 kg/m    Body mass index is 31.48 kg/m .  Physical Exam   GENERAL APPEARANCE: healthy, alert and no distress     EYES: PERRL, sclera clear     HENT: nose and mouth without ulcers or lesions     NECK: no adenopathy, no asymmetry, masses, or scars and thyroid normal to palpation     RESP: lungs clear to auscultation - no rales, rhonchi or wheezes     CV: regular rates and rhythm, normal S1 S2, no S3 or S4 and no murmur, click or rub      Abdomen: soft, nontender, no HSM or masses and bowel sounds normal     Ext: warm, dry, mild LE edema     Results for orders placed or performed in visit on 10/20/20   Lipid panel reflex to direct LDL Fasting     Status: None   Result Value Ref Range    Cholesterol 115 <200 mg/dL    Triglycerides 125 <150 mg/dL    HDL Cholesterol 71 >49 mg/dL    LDL Cholesterol Calculated 19 <100 mg/dL    Non HDL Cholesterol 44 <130 mg/dL   Hemoglobin A1c     Status: Abnormal   Result Value Ref Range    Hemoglobin A1C 8.7 (H) 0 - 5.6 %   Basic metabolic panel  (Ca, Cl, CO2, Creat, Gluc, K, Na, BUN)     Status: Abnormal   Result Value Ref Range    Sodium 138 133 - 144 mmol/L    Potassium 3.4 3.4 - 5.3 mmol/L    " Chloride 105 94 - 109 mmol/L    Carbon Dioxide 27 20 - 32 mmol/L    Anion Gap 6 3 - 14 mmol/L    Glucose 155 (H) 70 - 99 mg/dL    Urea Nitrogen 23 7 - 30 mg/dL    Creatinine 1.16 (H) 0.52 - 1.04 mg/dL    GFR Estimate 44 (L) >60 mL/min/[1.73_m2]    GFR Estimate If Black 51 (L) >60 mL/min/[1.73_m2]    Calcium 9.7 8.5 - 10.1 mg/dL   Albumin Random Urine Quantitative with Creat Ratio     Status: Abnormal   Result Value Ref Range    Creatinine Urine 127 mg/dL    Albumin Urine mg/L 52 mg/L    Albumin Urine mg/g Cr 40.55 (H) 0 - 25 mg/g Cr           Assessment & Plan       ICD-10-CM    1. Type 2 diabetes mellitus with hyperglycemia, with long-term current use of insulin (H)  E11.65 Lipid panel reflex to direct LDL Fasting    Z79.4    2. Hyperlipidemia LDL goal <100  E78.5 Hemoglobin A1c   3. Essential hypertension with goal blood pressure less than 140/90  I10 Basic metabolic panel  (Ca, Cl, CO2, Creat, Gluc, K, Na, BUN)     Albumin Random Urine Quantitative with Creat Ratio   4. Stage 3 chronic kidney disease, unspecified whether stage 3a or 3b CKD  N18.30 Basic metabolic panel  (Ca, Cl, CO2, Creat, Gluc, K, Na, BUN)     Albumin Random Urine Quantitative with Creat Ratio   5. Diabetes mellitus due to underlying condition with hyperglycemia, with long-term current use of insulin (H)   E08.65 Hemoglobin A1c    Z79.4 EYE ADULT REFERRAL   6. Follicular bronchiolitis (H)  J42         DM- not in control, A1C up to 8.7 from 6's.  Likely partly due to increased prednisone, but likely not all of it.  MTM met with pt today- asked to discuss sliding scale insulin.  Got lipids today, as well as recheck of BMP.     Rheum-  Prednisone per rheumatology has really helped her left ankle massive swelling, but it's likely worsening her DM control.    Other updates-   -Flu shot today.  Risks/benefits discussed, given today.   -Also due for Tdap- would like to hold off, which is okay.    CHF/breathing-   Hard time with the mask, breathing is  tough.    Will cont to f/u with cardiology.    R groin pain- told she needs a hip replacement by ortho.  May try an injection first.    Sjogrens/follicular bronchitis - reviewed last pulmonary appt notes from 11/14/19 after pt's appt.  They wanted her to cont on daily azithromycin, and switched her to BREO ellipta (from flovent).  Had rec f/u in 4 months to repeat full PFTs.  Unsure if they are doing PFTs currently.  Will discuss need for follow-up at next appt.**      Return in about 6 weeks (around 12/1/2020) for Diabetes, Routine Visit/Chronic Cares/Med Check.          Ilene Tristan MD  [facility]

## 2020-10-20 NOTE — PATIENT INSTRUCTIONS
Recommendations from today's MTM visit:                                                      1. Keep taking Novolog 6 units three times daily and also add sliding scale  B-199: 1 unit,   200-249: 2 units,   250-299: 3 units,   300-349: 4 units,   >350: 5 units    2. Increase Ozempic to 1mg once a week. If you want you can finish up your current ozempic supply by taking 2 shots of 0.5mg on the same day.  I sent a new prescription for the 1mg pens to your pharmacy.    It was great to speak with you today.  I value your experience and would be very thankful for your time with providing feedback on our clinic survey. You may receive a survey via email or text message in the next few days.     Next MTM visit: Sabrina will call you on Thursday and see how you are doing.     To schedule another MTM appointment, please call the clinic directly or you may call the MTM scheduling line at [phone] or toll-free at [phone].     My Clinical Pharmacist's contact information:                                                      It was a pleasure talking with you today!  Please feel free to contact me with any questions or concerns you have.      Sabrina Meek, Pharm.D., M.B.A., BCACP  MT Pharmacist, [facility]  Pager: [phone]  Email: [email or web address]

## 2020-10-29 ENCOUNTER — VIRTUAL VISIT (OUTPATIENT)
Dept: PHARMACY | Facility: CLINIC | Age: 80
End: 2020-10-29
Payer: COMMERCIAL

## 2020-10-29 DIAGNOSIS — E11.9 TYPE 2 DIABETES MELLITUS WITHOUT COMPLICATION, WITHOUT LONG-TERM CURRENT USE OF INSULIN (H): ICD-10-CM

## 2020-10-29 DIAGNOSIS — M19.90 INFLAMMATORY ARTHRITIS: Primary | ICD-10-CM

## 2020-10-29 PROCEDURE — 99606 MTMS BY PHARM EST 15 MIN: CPT | Mod: TEL | Performed by: PHARMACIST

## 2020-10-29 NOTE — PATIENT INSTRUCTIONS
Recommendations from today's MTM visit:                                                      1. Keep taking sliding scale as directed at last visit.   2. Increase the base mealtime Novolog from 6 to 7 units.     It was great to speak with you today.  I value your experience and would be very thankful for your time with providing feedback on our clinic survey. You may receive a survey via email or text message in the next few days.     Next MTM visit: 11/5 at 11:30am    To schedule another MTM appointment, please call the clinic directly or you may call the MTM scheduling line at [phone] or toll-free at [phone].     My Clinical Pharmacist's contact information:                                                      It was a pleasure talking with you today!  Please feel free to contact me with any questions or concerns you have.      Pavithra Oconnell PharmD  Medication Therapy Management Resident, [facility]  Pager: [phone]  Email: [email or web address]    Sabrina Meek, Pharm.D., M.B.A., BCACP  Medication Therapy Management Pharmacist, [facility]  Pager: [phone]  Email: [email or web address]

## 2020-10-29 NOTE — PROGRESS NOTES
"MTM ENCOUNTER  SUBJECTIVE/OBJECTIVE:                           Betty Tee is a 80 year old female called for a follow-up visit. She was referred to me from Dr. Tristan.  Today's visit is a follow-up MTM visit from 10/20/20     Chief Complaint: Diabetes follow-up.    Allergies/ADRs: Reviewed in chart  Tobacco: She reports that she quit smoking about 9 years ago. Her smoking use included cigarettes. She has a 5.00 pack-year smoking history. She has never used smokeless tobacco.  Alcohol: not currently using    Medication Adherence/Access: no issues reported    Gout/Pseudogout: Her foot is \"good\". She says it gets \"tight\" and there is \"a little swelling\" when she is on her feet for a long time. She is currently taking prednisone 20mg. She is not sure when she is planned to step down to 10mg (she does not set up her own pill boxes).      Type 2 Diabetes:  Pt currently taking Ozempic 1 mg weekly (increased from 0.5 on 10/20), Basaglar 22 units daily, and Novolog 6 units three times a day with meals along with sliding scale (1 extra unit for every 50 mg/dL over 150). She is usually needing 2-3 extra units with each meal, and she has not needed to use 4 or more extra units. Pt is not experiencing side effects.   SMBG: three times daily.  She says her morning readings are around 150, and later in the day it is typically in the low 200's. Nothing in the 300's or 400's.  Patient is not experiencing hypoglycemia. \"I have not had a problem with that\".   Recent symptoms of high blood sugar? none.     ASSESSMENT:                            Medication Adherence: No issues identified    Gout/Pseudogout: Improved. Continue with prednisone taper as directed.     Type 2 Diabetes: Improved. Morning blood glucose levels are within goal of . Given she is typically needing 2-3 units sliding scale Novolog with each meal, will slightly increase the base mealtime Novolog from 6 to 7 units and continue sliding scale.     PLAN:     "                        1. Keep taking sliding scale as directed at last visit.   2. Increase the base mealtime Novolog from 6 to 7 units.     I spent 6 minutes with this patient today. All changes were made via collaborative practice agreement with Dr. Tristan. A copy of the visit note was provided to the patient's primary care provider.    Will follow up in 1 week.    The patient was sent via [employer] a summary of these recommendations.     Pavithra Oconnell PharmD  Medication Therapy Management Resident  Sabrina Meek PharmD, JAMES, BCACP   Medication Therapy Management Pharmacist    Patient consented to a telehealth visit: yes  Telemedicine Visit Details  Type of service:  Telephone visit  Start Time: 9:44 AM  End Time: 9:50 AM  Originating Location (patient location): Home  Distant Location (provider location):  [facility]  Mode of Communication:  Telephone

## 2020-11-02 NOTE — RESULT ENCOUNTER NOTE
Here are your lab results from your recent visit...  -Your cholesterol panel looks good with a low LDL (the bad cholesterol) and a high HDL (the good cholesterol).   -Your A1C was high as we discussed.  -Your microalbumin level (which is the urine test that can signal signs of early chronic kidney disease if elevated to >30) is also a bit higher than we'd like to see, but hopefully this will improve as your diabetic control improves.  -Your basic metabolic panel (which includes electrolyte levels, blood sugar level and kidney function tests) also looked worse, with your GFR (the flow rate through your kidneys) going down in the 40s again.  We should check this again at your follow-up visit in early December, sooner if you are having any concerning symptoms.    Please let me know if you have any questions.  Lev Langley MD

## 2020-11-04 ENCOUNTER — TELEPHONE (OUTPATIENT)
Dept: RHEUMATOLOGY | Facility: CLINIC | Age: 80
End: 2020-11-04

## 2020-11-04 NOTE — TELEPHONE ENCOUNTER
Zulma Lopez MD Beard, Madeline, RN   Caller: Unspecified (Today, 12:00 PM)             How about 7.5 mg every day with update on Fri?      Called and updated pt, she will take 2.5 mg today, as she already took her 5 mg.  She will take the 7.5 mg a day and will call me on Friday and let me know how it goes.    Kamille Ruffin RN  Rheumatology Clinic

## 2020-11-04 NOTE — TELEPHONE ENCOUNTER
Received message from pt.  She is down to 5 mg a day of prednisone. Doing well except that by evenings her left foot is swelling and painful.  She is wondering if she should increase prednisone back to 10 mg a day or if there is something else she should be doing?    Will send to Dr. Lopez to review and advise.    Kamille Ruffin RN  Rheumatology Clinic

## 2020-11-05 ENCOUNTER — VIRTUAL VISIT (OUTPATIENT)
Dept: PHARMACY | Facility: CLINIC | Age: 80
End: 2020-11-05
Payer: COMMERCIAL

## 2020-11-05 DIAGNOSIS — E11.9 TYPE 2 DIABETES MELLITUS WITHOUT COMPLICATION, WITHOUT LONG-TERM CURRENT USE OF INSULIN (H): Primary | ICD-10-CM

## 2020-11-05 DIAGNOSIS — M19.90 INFLAMMATORY ARTHRITIS: ICD-10-CM

## 2020-11-05 PROCEDURE — 99606 MTMS BY PHARM EST 15 MIN: CPT | Mod: TEL | Performed by: PHARMACIST

## 2020-11-05 NOTE — PATIENT INSTRUCTIONS
Recommendations from today's MTM visit:                                                      1. Keep taking Novolog the same way you have been.   2. Increase Basaglar to 25 units once daily.     It was great to speak with you today.  I value your experience and would be very thankful for your time with providing feedback on our clinic survey. You may receive a survey via email or text message in the next few days.     Next MTM visit: We will check in on Tuesday 11/10 at 9:30am    To schedule another MTM appointment, please call the clinic directly or you may call the MTM scheduling line at [phone] or toll-free at [phone].     My Clinical Pharmacist's contact information:                                                      It was a pleasure talking with you today!  Please feel free to contact me with any questions or concerns you have.      Pavithra Oconnell PharmD  Medication Therapy Management Resident, [facility] and [facility]  Pager: [phone]  Email: [email or web address]    Sabrina Meek, Pharm.D., M.B.A., BCACP  Medication Therapy Management Pharmacist, [facility]  Pager: [phone]  Email: [email or web address]

## 2020-11-05 NOTE — PROGRESS NOTES
MTM ENCOUNTER  SUBJECTIVE/OBJECTIVE:                           Betty Tee is a 80 year old female called for a follow-up visit. She was referred to me from Dr. Tristan.  Today's visit is a follow-up MTM visit from 10/29/20.    Reason for visit: Diabetes follow-up.    Allergies/ADRs: Reviewed in chart  Tobacco: She reports that she quit smoking about 9 years ago. Her smoking use included cigarettes. She has a 5.00 pack-year smoking history. She has never used smokeless tobacco.  Alcohol: not currently using    Medication Adherence/Access: no issues reported    Gout/Pseudogout: Currently taking prednisone 7.5mg daily (was down to 5mg, but was having increased swelling and pain so it was increased to 7.5mg yesterday by rheumatology). She is also keeping it elevated to see if that helps. The pain was aggravated recently by helping a friend move.     Type 2 Diabetes:  Currently taking Ozempic 1mg weekly, Basaglar 22 units daily, and Novolog 7 units three times daily with meals along with sliding scale (1 extra unit for every 50 mg/dL over 150). She is needing the sliding scale more in the evening than in the morning. Using about 2-3 extra units in the evening. Patient is not experiencing side effects.  Blood sugar monitoring: (patient reported) , 170. PM has been in the 200's, was up to 250 yesterday.  Symptoms of low blood sugar? none  Symptoms of high blood sugar? none    ASSESSMENT:                            Medication Adherence: No issues identified    Gout/pseudogout: Plan in place to check in with rheumatology tomorrow.     Type 2 Diabetes: SMBG is consistently above goal in the morning and the evening. Would benefit from increasing basal insulin.     PLAN:                            1. Keep taking Novolog the same way you have been.   2. Increase Basaglar to 25 units once daily.     I spent 7 minutes with this patient today. All changes were made via collaborative practice agreement with Dr. Tristan. A  copy of the visit note was provided to the patient's primary care provider.    Will follow up Tuesday 11/10 at 9:30am.    The patient was sent via [employer] a summary of these recommendations.     Pavithra Oconnell PharmD  Medication Therapy Management Resident  Marcos NicoleD, JAMES, BCACP   Medication Therapy Management Pharmacist    Patient consented to a telehealth visit: yes  Telemedicine Visit Details  Type of service:  Telephone visit  Start Time: 11:33  End Time: 11:40 AM  Originating Location (patient location): Home  Distant Location (provider location):  [facility]  Mode of Communication:  Telephone

## 2020-11-06 NOTE — TELEPHONE ENCOUNTER
Pt called into clinic today and left message, foot remains swollen and feels tight, despite increasing to 7.5 mg a day of prednisone.  She is wondering if she should stay on the 7.5 mg a day of prednisone.    Kamille Ruffin RN  Rheumatology Clinic

## 2020-11-06 NOTE — TELEPHONE ENCOUNTER
Zulma Lopez MD Beard, Madeline, RN   Caller: Unspecified (2 days ago, 12:00 PM)             Ok, let's try 10 mg every day with update on Mon      Called and spoke with pt, she is going to increase the prednisone and call me on Monday.    Kamille Ruffin RN  Rheumatology Clinic

## 2020-11-09 DIAGNOSIS — I50.30 (HFPEF) HEART FAILURE WITH PRESERVED EJECTION FRACTION (H): ICD-10-CM

## 2020-11-09 DIAGNOSIS — I48.21 PERMANENT ATRIAL FIBRILLATION (H): ICD-10-CM

## 2020-11-11 DIAGNOSIS — E11.65 TYPE 2 DIABETES MELLITUS WITH HYPERGLYCEMIA, WITH LONG-TERM CURRENT USE OF INSULIN (H): ICD-10-CM

## 2020-11-11 DIAGNOSIS — Z79.4 TYPE 2 DIABETES MELLITUS WITH HYPERGLYCEMIA, WITH LONG-TERM CURRENT USE OF INSULIN (H): ICD-10-CM

## 2020-11-11 RX ORDER — SEMAGLUTIDE 1.34 MG/ML
1 INJECTION, SOLUTION SUBCUTANEOUS
Refills: 0
Start: 2020-11-11

## 2020-11-11 NOTE — TELEPHONE ENCOUNTER
Zulma Lopez MD Beard, Madeline, RN   Caller: Unspecified (1 week ago, 12:00 PM)             Does she have a PCP? Wondering if she could benefit from lasix, stockings, mx of edema since swelling is at the end of the day. Prednisone itself could contribute tos welling as well.      Kamille Ruffin RN  Rheumatology Clinic

## 2020-11-11 NOTE — TELEPHONE ENCOUNTER
Called and spoke with pt, she continues to have some swelling in the evening, skin gets tight, but she feels like the 10 mg is working as the pain is significantly less on this dose.      She said that before she went up to 10 mg a day, her entire body was hurting, now she is feeling pretty good.     Pt has follow up appt with Dr. Lopez on 12/11.  She is wondering what to do until then.      Will send message to Dr. Lopez to review and advise.    Kamille Ruffin RN  Rheumatology Clinic

## 2020-11-12 ENCOUNTER — TELEPHONE (OUTPATIENT)
Dept: PHARMACY | Facility: CLINIC | Age: 80
End: 2020-11-12

## 2020-11-12 NOTE — TELEPHONE ENCOUNTER
Called pt to f/u on last week's insulin adjustments -     Currently taking Ozempic 1mg weekly, Basaglar 25 units daily, and Novolog 7 units three times daily with meals along with sliding scale (1 extra unit for every 50 mg/dL) Using about 2-3 extra units with each meal. Patient is not experiencing side effects.    BG this AM was 134 this morning - now always under 150.   PP - still in the low 200's, 210 and 208 are her most recent readings.     She has increased her prednisone to 10mg (up from 7.5mg daily) due to increased swelling and pain (under the guidance of rheumatology).     Plan:   Increase Novolog to 8 units TID before meals plus sliding scale (1 extra unit for every 50 mg/dL).  No changes in basaglar.     MTM to f/u next week.   Sabrina Meek, PharmD, JAMES, BCACP  MTM Pharmacist, [facility]

## 2020-11-13 NOTE — TELEPHONE ENCOUNTER
Called and spoke with pt regarding Dr. Lopez's recommendation. Pt will plan to set up appt with PCP.    Kamille Ruffin RN  Rheumatology Clinic

## 2020-11-24 ENCOUNTER — TELEPHONE (OUTPATIENT)
Dept: PHARMACY | Facility: CLINIC | Age: 80
End: 2020-11-24

## 2020-11-24 DIAGNOSIS — E11.69 TYPE 2 DIABETES MELLITUS WITH OTHER SPECIFIED COMPLICATION (H): ICD-10-CM

## 2020-11-24 RX ORDER — INSULIN ASPART 100 [IU]/ML
INJECTION, SOLUTION INTRAVENOUS; SUBCUTANEOUS
Qty: 15 ML | Refills: 0
Start: 2020-11-24 | End: 2021-01-21

## 2020-11-24 NOTE — TELEPHONE ENCOUNTER
Called Betty to check on blood sugars.     Currently taking Ozempic 1mg weekly, Basaglar 25 units daily, and Novolog 8 units three times daily with meals along with sliding scale (1 extra unit for every 50 mg/dL) Patient is not experiencing side effects.    She states she is doing well. She is currently taking 10 mg of prednisone. She is wearing support socks which helps with the swelling slightly, but there is some swelling above the sock. The swelling unilateral. She does feel somewhat short of breath and she is using her oxygen a little more than usual. She is not short of breath at rest, but with activity. She denies shortness of breath when lying flat. Her weight is stable around 200 lbs.      Yesterday her readings were 153 AM, 238 before lunch, 272 before supper. Generally AM is low to mid 100's, and low to mid 200's before lunch.     She denies symptoms of hypoglycemia. She endorses eating a big meal and feeling hungry right after, but she wonders if this is psychological. Prednisone could also be contributing.     Plan: increase Novolog to 9 units with sliding scale (1 extra unit for every 50 mg/dL). No changes to Basaglar, or Ozempic.     MTM to f/u next week.     Pavithra Oconnell, PharmD  Medication Therapy Management Resident  Sabrina Meek, PharmD, JAMES, BCACP   Medication Therapy Management Pharmacist

## 2020-12-01 DIAGNOSIS — M85.80 OSTEOPENIA, UNSPECIFIED LOCATION: ICD-10-CM

## 2020-12-01 DIAGNOSIS — Z79.52 CURRENT CHRONIC USE OF SYSTEMIC STEROIDS: ICD-10-CM

## 2020-12-02 DIAGNOSIS — E55.9 VITAMIN D DEFICIENCY: ICD-10-CM

## 2020-12-03 ENCOUNTER — TELEPHONE (OUTPATIENT)
Dept: PHARMACY | Facility: CLINIC | Age: 80
End: 2020-12-03

## 2020-12-03 DIAGNOSIS — Z79.4 TYPE 2 DIABETES MELLITUS WITH HYPERGLYCEMIA, WITH LONG-TERM CURRENT USE OF INSULIN (H): ICD-10-CM

## 2020-12-03 DIAGNOSIS — E11.65 TYPE 2 DIABETES MELLITUS WITH HYPERGLYCEMIA, WITH LONG-TERM CURRENT USE OF INSULIN (H): ICD-10-CM

## 2020-12-03 RX ORDER — INSULIN GLARGINE 100 [IU]/ML
30 INJECTION, SOLUTION SUBCUTANEOUS DAILY
Qty: 15 ML | Refills: 1
Start: 2020-12-03 | End: 2021-01-21

## 2020-12-03 NOTE — TELEPHONE ENCOUNTER
"Called Betty to check on blood sugars.    Currently taking Ozempic 1mg weekly, Basaglar 25 units daily, and Novolog 9 units three times daily with meals along with sliding scale (1 extra unit for every 50 mg/dL over 150). In the afternoon she does need to add on \"a little extra\"  with the sliding scale. Patient is not experiencing side effects.    Blood sugar monitoring: (patient reported)   AM: 160 this morning, but it has been lower than that lately (140-150). One morning was 124.   PP: Normally a little over 200 in the afternoon (210, 204)    Symptoms of low blood sugar? none  Symptoms of high blood sugar? thirst    Currently taking prednisone 10 mg daily. At night the foot feels \"tight but does not swell up big\".     Plan: Increase Balaglar to 30 units daily. Keep Novolog and Ozempic the same.     MTM to f/u next week.     Pavithra Oconnell, PharmD  Medication Therapy Management Resident  Sabrina Meek, MarcosD, JAMES, BCACP  MTM Pharmacist, [facility]       "

## 2020-12-04 ENCOUNTER — OFFICE VISIT (OUTPATIENT)
Dept: FAMILY MEDICINE | Facility: CLINIC | Age: 80
End: 2020-12-04
Payer: MEDICARE

## 2020-12-04 VITALS
OXYGEN SATURATION: 97 % | HEART RATE: 60 BPM | RESPIRATION RATE: 14 BRPM | SYSTOLIC BLOOD PRESSURE: 115 MMHG | BODY MASS INDEX: 32.02 KG/M2 | DIASTOLIC BLOOD PRESSURE: 80 MMHG | WEIGHT: 204 LBS | HEIGHT: 67 IN

## 2020-12-04 DIAGNOSIS — Z23 NEED FOR TDAP VACCINATION: ICD-10-CM

## 2020-12-04 DIAGNOSIS — M16.11 OSTEOARTHRITIS OF RIGHT HIP, UNSPECIFIED OSTEOARTHRITIS TYPE: ICD-10-CM

## 2020-12-04 DIAGNOSIS — G89.29 OTHER CHRONIC PAIN: ICD-10-CM

## 2020-12-04 DIAGNOSIS — E11.65 TYPE 2 DIABETES MELLITUS WITH HYPERGLYCEMIA, WITH LONG-TERM CURRENT USE OF INSULIN (H): Primary | ICD-10-CM

## 2020-12-04 DIAGNOSIS — G47.33 OSA (OBSTRUCTIVE SLEEP APNEA): ICD-10-CM

## 2020-12-04 DIAGNOSIS — I10 ESSENTIAL HYPERTENSION WITH GOAL BLOOD PRESSURE LESS THAN 140/90: ICD-10-CM

## 2020-12-04 DIAGNOSIS — N18.31 STAGE 3A CHRONIC KIDNEY DISEASE (H): ICD-10-CM

## 2020-12-04 DIAGNOSIS — M35.3 POLYMYALGIA RHEUMATICA (H): ICD-10-CM

## 2020-12-04 DIAGNOSIS — Z79.4 TYPE 2 DIABETES MELLITUS WITH HYPERGLYCEMIA, WITH LONG-TERM CURRENT USE OF INSULIN (H): Primary | ICD-10-CM

## 2020-12-04 LAB
ALBUMIN SERPL-MCNC: 3.1 G/DL (ref 3.4–5)
ALP SERPL-CCNC: 95 U/L (ref 40–150)
ALT SERPL W P-5'-P-CCNC: 20 U/L (ref 0–50)
ANION GAP SERPL CALCULATED.3IONS-SCNC: 6 MMOL/L (ref 3–14)
AST SERPL W P-5'-P-CCNC: 18 U/L (ref 0–45)
BASOPHILS # BLD AUTO: 0.1 10E9/L (ref 0–0.2)
BASOPHILS NFR BLD AUTO: 0.5 %
BILIRUB SERPL-MCNC: 0.6 MG/DL (ref 0.2–1.3)
BUN SERPL-MCNC: 16 MG/DL (ref 7–30)
CALCIUM SERPL-MCNC: 9.4 MG/DL (ref 8.5–10.1)
CHLORIDE SERPL-SCNC: 105 MMOL/L (ref 94–109)
CO2 SERPL-SCNC: 27 MMOL/L (ref 20–32)
CREAT SERPL-MCNC: 1.49 MG/DL (ref 0.52–1.04)
CRP SERPL-MCNC: 15 MG/L (ref 0–8)
DIFFERENTIAL METHOD BLD: NORMAL
EOSINOPHIL # BLD AUTO: 0.2 10E9/L (ref 0–0.7)
EOSINOPHIL NFR BLD AUTO: 1.7 %
ERYTHROCYTE [DISTWIDTH] IN BLOOD BY AUTOMATED COUNT: 15 % (ref 10–15)
ERYTHROCYTE [SEDIMENTATION RATE] IN BLOOD BY WESTERGREN METHOD: 15 MM/H (ref 0–30)
GFR SERPL CREATININE-BSD FRML MDRD: 33 ML/MIN/{1.73_M2}
GLUCOSE SERPL-MCNC: 176 MG/DL (ref 70–99)
HCT VFR BLD AUTO: 41.2 % (ref 35–47)
HGB BLD-MCNC: 13.2 G/DL (ref 11.7–15.7)
LYMPHOCYTES # BLD AUTO: 1.5 10E9/L (ref 0.8–5.3)
LYMPHOCYTES NFR BLD AUTO: 14.4 %
MCH RBC QN AUTO: 30.1 PG (ref 26.5–33)
MCHC RBC AUTO-ENTMCNC: 32 G/DL (ref 31.5–36.5)
MCV RBC AUTO: 94 FL (ref 78–100)
MONOCYTES # BLD AUTO: 1.1 10E9/L (ref 0–1.3)
MONOCYTES NFR BLD AUTO: 10.6 %
NEUTROPHILS # BLD AUTO: 7.5 10E9/L (ref 1.6–8.3)
NEUTROPHILS NFR BLD AUTO: 72.8 %
PLATELET # BLD AUTO: 189 10E9/L (ref 150–450)
POTASSIUM SERPL-SCNC: 3.6 MMOL/L (ref 3.4–5.3)
PROT SERPL-MCNC: 7.3 G/DL (ref 6.8–8.8)
RBC # BLD AUTO: 4.38 10E12/L (ref 3.8–5.2)
SODIUM SERPL-SCNC: 138 MMOL/L (ref 133–144)
WBC # BLD AUTO: 10.3 10E9/L (ref 4–11)

## 2020-12-04 PROCEDURE — 86140 C-REACTIVE PROTEIN: CPT | Performed by: FAMILY MEDICINE

## 2020-12-04 PROCEDURE — 99214 OFFICE O/P EST MOD 30 MIN: CPT | Mod: 25 | Performed by: FAMILY MEDICINE

## 2020-12-04 PROCEDURE — 80053 COMPREHEN METABOLIC PANEL: CPT | Performed by: FAMILY MEDICINE

## 2020-12-04 PROCEDURE — 85025 COMPLETE CBC W/AUTO DIFF WBC: CPT | Performed by: FAMILY MEDICINE

## 2020-12-04 PROCEDURE — 36415 COLL VENOUS BLD VENIPUNCTURE: CPT | Performed by: FAMILY MEDICINE

## 2020-12-04 PROCEDURE — 90715 TDAP VACCINE 7 YRS/> IM: CPT | Performed by: FAMILY MEDICINE

## 2020-12-04 PROCEDURE — 90471 IMMUNIZATION ADMIN: CPT | Performed by: FAMILY MEDICINE

## 2020-12-04 PROCEDURE — 85652 RBC SED RATE AUTOMATED: CPT | Performed by: FAMILY MEDICINE

## 2020-12-04 RX ORDER — CHOLECALCIFEROL (VITAMIN D3) 50 MCG
50 TABLET ORAL DAILY
Qty: 90 TABLET | Refills: 2 | Status: SHIPPED | OUTPATIENT
Start: 2020-12-04 | End: 2021-08-31

## 2020-12-04 RX ORDER — SEMAGLUTIDE 1.34 MG/ML
1 INJECTION, SOLUTION SUBCUTANEOUS
Qty: 3 ML | Refills: 1 | Status: SHIPPED | OUTPATIENT
Start: 2020-12-04 | End: 2021-03-01

## 2020-12-04 ASSESSMENT — PAIN SCALES - GENERAL: PAINLEVEL: SEVERE PAIN (6)

## 2020-12-04 ASSESSMENT — MIFFLIN-ST. JEOR: SCORE: 1427.97

## 2020-12-04 NOTE — TELEPHONE ENCOUNTER
ALENDRONATE SODIUM 70 MG TAB   Last Written Prescription Date:  6/11/2020  Last Fill Quantity: 12,   # refills: 1  Last Office Visit : 9/9/2020  Future Office visit:  12/11/2020    Routing refill request to provider for review/approval because:  Abnormal Labs    Creatinine         Refer to Provider for review       Recent Labs   Lab Test 10/20/20  0932 04/02/17  2213 04/02/17  2213   CR 1.16*   < >  --    CREAT  --   --  0.6       Mere Weinberg RN  Central Triage Red Flags/Med Refills

## 2020-12-04 NOTE — PROGRESS NOTES
Subjective     Betty Tee is a 80 year old female who presents to clinic today for the following health issues:    HPI       Diabetes Follow-up  How often are you checking your blood sugar? Three times daily  Blood sugar testing frequency justification:  Uncontrolled diabetes  What time of day are you checking your blood sugars (select all that apply)?  Before meals  Have you had any blood sugars above 200?  Yes highest 390  Have you had any blood sugars below 70?  No    What symptoms do you notice when your blood sugar is low?  None    What concerns do you have today about your diabetes? None     Do you have any of these symptoms? (Select all that apply)  Numbness in feet, Burning in feet and Redness, sores, or blisters on feet also having discoloration in both feet     Have you had a diabetic eye exam in the last 12 months? No      Rheum-   Needs labs for f/u appt next week.  Orders in chart.  Dr. Fairchild also wanted pt to check in here due to persistent left foot swelling.    Pt initially went to ER for left foot massive swelling and pain.  They were going to see if it was a blood clot.  So painful, they couldn't do the ultrasound.  They gave her some pain medication, which helped.  They did get an xray and discussed pt with Dr. Lopez.      She increased her prednisone due to the left foot swelling/pain.  Tapering dose from 40mg/d, now back down to 10mg/d.  Tried going back down her baseline 5mg/d, but her foot started swelling again, so went back up to 10mg/d.    It was bad for awhile, but it's much better now.  Was getting really tight at night- that is also better now.  Has swelling at top of compression stockings at night.  Foot is now 92.5% back to normal.    Just tightness in evening, especially if she can't keep it elevated.      ANGELICA- has to wear CPAP at night.  This am, she had such a heaviness in her chest, it hurt.  Sat up in bed, and she had really large belch, feels so much better.  It happens  "most mornings lately, but it was more this morning.     Breathing is stable overall, but tough with the mask.  Has to stop after a few steps and catch her breath.  Made it all around [employer] yesterday.    DM II-   MTM recently increased basaglar and novolog      MDD-   Doing okay, but tough seeing her roommate struggle with her family issues- multiple sisters with dementia.        BP Readings from Last 2 Encounters:   12/04/20 115/80   10/20/20 124/81     Hemoglobin A1C (%)   Date Value   10/20/2020 8.7 (H)   06/19/2020 6.8 (H)     LDL Cholesterol Calculated (mg/dL)   Date Value   10/20/2020 19   01/21/2020 1           How many servings of fruits and vegetables do you eat daily?  2-3    On average, how many sweetened beverages do you drink each day (Examples: soda, juice, sweet tea, etc.  Do NOT count diet or artificially sweetened beverages)?   0    How many days per week do you exercise enough to make your heart beat faster? 3 or less    How many minutes a day do you exercise enough to make your heart beat faster? 9 or less    How many days per week do you miss taking your medication? 0        Review of Systems   Constitutional, HEENT, cardiovascular, pulmonary, gi and gu systems are negative, except as otherwise noted.        Objective    /80 (BP Location: Right arm, Patient Position: Sitting, Cuff Size: Adult Large)   Pulse 60   Resp 14   Ht 1.702 m (5' 7\")   Wt 92.5 kg (204 lb)   SpO2 97%   BMI 31.95 kg/m    Body mass index is 31.95 kg/m .  Physical Exam   GENERAL APPEARANCE: healthy, alert and no distress     EYES: PERRL, sclera clear     HENT: nose and mouth without ulcers or lesions     NECK: no adenopathy, no asymmetry, masses, or scars and thyroid normal to palpation     RESP: lungs clear to auscultation - no rales, rhonchi or wheezes     CV: regular rates and rhythm, normal S1 S2, no S3 or S4 and no murmur, click or rub      Abdomen: soft, nontender, no HSM or masses and bowel sounds normal     " Ext: warm, dry, no edema     Results for orders placed or performed in visit on 12/04/20   Comprehensive metabolic panel (BMP + Alb, Alk Phos, ALT, AST, Total. Bili, TP)     Status: Abnormal   Result Value Ref Range    Sodium 138 133 - 144 mmol/L    Potassium 3.6 3.4 - 5.3 mmol/L    Chloride 105 94 - 109 mmol/L    Carbon Dioxide 27 20 - 32 mmol/L    Anion Gap 6 3 - 14 mmol/L    Glucose 176 (H) 70 - 99 mg/dL    Urea Nitrogen 16 7 - 30 mg/dL    Creatinine 1.49 (H) 0.52 - 1.04 mg/dL    GFR Estimate 33 (L) >60 mL/min/[1.73_m2]    GFR Estimate If Black 38 (L) >60 mL/min/[1.73_m2]    Calcium 9.4 8.5 - 10.1 mg/dL    Bilirubin Total 0.6 0.2 - 1.3 mg/dL    Albumin 3.1 (L) 3.4 - 5.0 g/dL    Protein Total 7.3 6.8 - 8.8 g/dL    Alkaline Phosphatase 95 40 - 150 U/L    ALT 20 0 - 50 U/L    AST 18 0 - 45 U/L   Erythrocyte sedimentation rate auto     Status: None   Result Value Ref Range    Sed Rate 15 0 - 30 mm/h   CRP inflammation     Status: Abnormal   Result Value Ref Range    CRP Inflammation 15.0 (H) 0.0 - 8.0 mg/L   CBC with platelets differential     Status: None   Result Value Ref Range    WBC 10.3 4.0 - 11.0 10e9/L    RBC Count 4.38 3.8 - 5.2 10e12/L    Hemoglobin 13.2 11.7 - 15.7 g/dL    Hematocrit 41.2 35.0 - 47.0 %    MCV 94 78 - 100 fl    MCH 30.1 26.5 - 33.0 pg    MCHC 32.0 31.5 - 36.5 g/dL    RDW 15.0 10.0 - 15.0 %    Platelet Count 189 150 - 450 10e9/L    % Neutrophils 72.8 %    % Lymphocytes 14.4 %    % Monocytes 10.6 %    % Eosinophils 1.7 %    % Basophils 0.5 %    Absolute Neutrophil 7.5 1.6 - 8.3 10e9/L    Absolute Lymphocytes 1.5 0.8 - 5.3 10e9/L    Absolute Monocytes 1.1 0.0 - 1.3 10e9/L    Absolute Eosinophils 0.2 0.0 - 0.7 10e9/L    Absolute Basophils 0.1 0.0 - 0.2 10e9/L    Diff Method Automated Method            Assessment & Plan       ICD-10-CM    1. Type 2 diabetes mellitus with hyperglycemia, with long-term current use of insulin (H)  E11.65 EYE ADULT REFERRAL    Z79.4 semaglutide (OZEMPIC, 1 MG/DOSE,)  2 MG/1.5ML pen   2. ANGELICA (obstructive sleep apnea)  G47.33 SLEEP EVALUATION & MANAGEMENT REFERRAL - [facility] [place] [phone] (Age 15 and up)   3. Essential hypertension with goal blood pressure less than 140/90  I10 Comprehensive metabolic panel (BMP + Alb, Alk Phos, ALT, AST, Total. Bili, TP)   4. Stage 3a chronic kidney disease  N18.31 Comprehensive metabolic panel (BMP + Alb, Alk Phos, ALT, AST, Total. Bili, TP)   5. Need for Tdap vaccination  Z23 TDAP VACCINE (Adacel, Boostrix)  [[id number]]   6. Other chronic pain  G89.29 Erythrocyte sedimentation rate auto     CRP inflammation     CBC with platelets differential     CANCELED: Creatinine     CANCELED: AST     CANCELED: Albumin level     CANCELED: ALT   7. Osteoarthritis of right hip, unspecified osteoarthritis type  M16.11 Erythrocyte sedimentation rate auto     CRP inflammation     CBC with platelets differential     CANCELED: Creatinine     CANCELED: AST     CANCELED: Albumin level     CANCELED: ALT   8. Polymyalgia rheumatica (H)  M35.3 Erythrocyte sedimentation rate auto     CRP inflammation     CBC with platelets differential     CANCELED: Creatinine     CANCELED: AST     CANCELED: Albumin level     CANCELED: ALT      DM II- 10/20 A1C up at 8.7, working with MTM.  Recently had basaglar increased to 30 units/day, and novolog increased to 9 units/dose.  Will cont close f/u with MTM.  Ozempic rx refills sent today.  Labs otherwise UTD.    ANGELICA/am chest tightness-   Pt had sleep study a few yrs ago, last seen by sleep specialist in 2019, but provider left the clinic, and pt hasn't been seen back since.  Will re-refer, see if they think her am belching could be related to CPAP/BiPAP settings- needs f/u regardless.    HTN/CKD- BP in good control on current meds.  Will continue.  Labs UTD.    Osteoarthritis/PMR/Sciatic symptoms-  Left foot pain/swelling- severe recent sx's, ER visit, much improved with prednisone taper. Reviewed TE with  rheum concerns about potential other issues going on with persistent evening foot/leg swelling, but pt is already following with cardiology for CHF, keeping legs elevated, wearing compression stockings on outings, and sx's are now mostly resolved.      She is also having what sounds like R sciatic sx's.  For sciatic sx's, and recurrent pain issues, recommended she consider seeing Dr. Moore for establishing care, otherwise her symptoms tend to become unbearable and she needs acute/emergent cares.    Tdap vaccine- Risks/benefits discussed, given today.           Return in about 3 months (around 3/4/2021) for Diabetes, Routine Visit/Chronic Cares/Med Check.    Ilene Tristan MD  [facility]      Spent greater than 50% of 40 minutes in face to face time counseling patient regarding multiple issues and coordination of care as noted above.

## 2020-12-04 NOTE — LETTER
December 5, 2020      Betty GRISELDA Randal  [address]  [place] [address]        Dear ,    We are writing to inform you of your test results.    ESR/CRP inflammation markers are much improved.    Resulted Orders   Erythrocyte sedimentation rate auto   Result Value Ref Range    Sed Rate 15 0 - 30 mm/h   CRP inflammation   Result Value Ref Range    CRP Inflammation 15.0 (H) 0.0 - 8.0 mg/L   CBC with platelets differential   Result Value Ref Range    WBC 10.3 4.0 - 11.0 10e9/L    RBC Count 4.38 3.8 - 5.2 10e12/L    Hemoglobin 13.2 11.7 - 15.7 g/dL    Hematocrit 41.2 35.0 - 47.0 %    MCV 94 78 - 100 fl    MCH 30.1 26.5 - 33.0 pg    MCHC 32.0 31.5 - 36.5 g/dL    RDW 15.0 10.0 - 15.0 %    Platelet Count 189 150 - 450 10e9/L    % Neutrophils 72.8 %    % Lymphocytes 14.4 %    % Monocytes 10.6 %    % Eosinophils 1.7 %    % Basophils 0.5 %    Absolute Neutrophil 7.5 1.6 - 8.3 10e9/L    Absolute Lymphocytes 1.5 0.8 - 5.3 10e9/L    Absolute Monocytes 1.1 0.0 - 1.3 10e9/L    Absolute Eosinophils 0.2 0.0 - 0.7 10e9/L    Absolute Basophils 0.1 0.0 - 0.2 10e9/L    Diff Method Automated Method        If you have any questions or concerns, please call the clinic at the number listed above.       Sincerely,      Zulma Lopez MD

## 2020-12-06 RX ORDER — ALENDRONATE SODIUM 70 MG/1
70 TABLET ORAL
Qty: 12 TABLET | Refills: 1 | Status: SHIPPED | OUTPATIENT
Start: 2020-12-06 | End: 2021-05-20

## 2020-12-10 ENCOUNTER — TELEPHONE (OUTPATIENT)
Dept: PHARMACY | Facility: CLINIC | Age: 80
End: 2020-12-10

## 2020-12-10 NOTE — TELEPHONE ENCOUNTER
Called patient to follow-up on blood sugars - 124 yesterday AM, 151 at noon, 208 last night. She is not able to access the rest of her records at the time of our call. She is feeling good, no low blood sugars. Will call her again next week to follow-up and potentially make insulin adjustments.   Sabrina Meek, MarcosD, JAMES, BCACP  MTM Pharmacist, [facility]

## 2020-12-10 NOTE — RESULT ENCOUNTER NOTE
Called pt with results...  Creatinine/GFR worse again- had been stable to good a few months ago, and now GFR was 44 on 10/20/20 and 33 at last visit.  She had been feeling well other than the pain issues, which were in okay control with increased prednisone.  Hasn't been taking NSAIDs/asa, just tylenol, and has been hydrating well.  Will get her BP machine out at home and see if they are running high, low or okay- mychart in if high or low.  Rec f/u in 1/21 instead of waiting until 3/21 so we can recheck her BMP labs earlier.  Pt agrees with plan.  CHERELLEI to MT.  CW

## 2020-12-11 ENCOUNTER — TELEPHONE (OUTPATIENT)
Dept: FAMILY MEDICINE | Facility: CLINIC | Age: 80
End: 2020-12-11

## 2020-12-11 ENCOUNTER — VIRTUAL VISIT (OUTPATIENT)
Dept: RHEUMATOLOGY | Facility: CLINIC | Age: 80
End: 2020-12-11
Attending: INTERNAL MEDICINE
Payer: MEDICARE

## 2020-12-11 DIAGNOSIS — M25.512 CHRONIC PAIN OF BOTH SHOULDERS: Primary | ICD-10-CM

## 2020-12-11 DIAGNOSIS — M25.511 CHRONIC PAIN OF BOTH SHOULDERS: Primary | ICD-10-CM

## 2020-12-11 DIAGNOSIS — G89.29 CHRONIC PAIN OF BOTH SHOULDERS: Primary | ICD-10-CM

## 2020-12-11 DIAGNOSIS — M16.11 OSTEOARTHRITIS OF RIGHT HIP, UNSPECIFIED OSTEOARTHRITIS TYPE: ICD-10-CM

## 2020-12-11 DIAGNOSIS — B00.1 RECURRENT COLD SORES: ICD-10-CM

## 2020-12-11 DIAGNOSIS — G89.29 OTHER CHRONIC PAIN: Primary | ICD-10-CM

## 2020-12-11 DIAGNOSIS — N18.9 CHRONIC KIDNEY DISEASE, UNSPECIFIED CKD STAGE: ICD-10-CM

## 2020-12-11 DIAGNOSIS — M19.90 INFLAMMATORY ARTHRITIS: ICD-10-CM

## 2020-12-11 PROCEDURE — 99214 OFFICE O/P EST MOD 30 MIN: CPT | Mod: 95 | Performed by: INTERNAL MEDICINE

## 2020-12-11 RX ORDER — TRAMADOL HYDROCHLORIDE 50 MG/1
50 TABLET ORAL EVERY 8 HOURS PRN
Qty: 60 TABLET | Refills: 3 | Status: SHIPPED | OUTPATIENT
Start: 2020-12-11 | End: 2021-02-05

## 2020-12-11 ASSESSMENT — PAIN SCALES - GENERAL: PAINLEVEL: SEVERE PAIN (6)

## 2020-12-11 NOTE — TELEPHONE ENCOUNTER
CW,  Please see below message and advise.  Pain referral was placed within past year but is no longer active when looking at active orders  Thanks,  Lydia HALEY RN

## 2020-12-11 NOTE — TELEPHONE ENCOUNTER
Reason for Call:  Other - Referral    Detailed comments: Dr. Moore [address], Pain management specialist. Pt needs referral.    Phone Number Patient can be reached at: Cell number on file:    Telephone Information:   Mobile [phone]       Best Time: Anytrime    Can we leave a detailed message on this number? YES    Call taken on 12/11/2020 at 3:33 PM by Ashli Kan

## 2020-12-11 NOTE — PROGRESS NOTES
"Betty Tee is a 80 year old female who is being evaluated via a billable video visit.      The patient has been notified of following:     \"This video visit will be conducted via a call between you and your physician/provider. We have found that certain health care needs can be provided without the need for an in-person physical exam.  This service lets us provide the care you need with a video conversation.  If a prescription is necessary we can send it directly to your pharmacy.  If lab work is needed we can place an order for that and you can then stop by our lab to have the test done at a later time.    Video visits are billed at different rates depending on your insurance coverage.  Please reach out to your insurance provider with any questions.    If during the course of the call the physician/provider feels a video visit is not appropriate, you will not be charged for this service.\"    Patient has given verbal consent for Video visit? Yes  How would you like to obtain your AVS? MyChart  If you are dropped from the video visit, the video invite should be resent to: Send to e-mail at: [email or web address]  Will anyone else be joining your video visit? No        Video-Visit Details    Type of service:  Video Visit    Video Start Time: 9:34 am  Video End Time: 9:55 am    Originating Location (pt. Location): Home    Distant Location (provider location):  [facility] [place]     Platform used for Video Visit: Thad Lopez MD    "

## 2020-12-11 NOTE — LETTER
"12/11/2020       RE: Betty Tee  [address]  [facility] [address]     Dear Colleague,    Thank you for referring your patient, Betty Tee, to the [facility] RHEUMATOLOGY CLINIC [place] [facility]. Please see a copy of my visit note below.    Betty Tee is a 80 year old female who is being evaluated via a billable video visit.      The patient has been notified of following:     \"This video visit will be conducted via a call between you and your physician/provider. We have found that certain health care needs can be provided without the need for an in-person physical exam.  This service lets us provide the care you need with a video conversation.  If a prescription is necessary we can send it directly to your pharmacy.  If lab work is needed we can place an order for that and you can then stop by our lab to have the test done at a later time.    Video visits are billed at different rates depending on your insurance coverage.  Please reach out to your insurance provider with any questions.    If during the course of the call the physician/provider feels a video visit is not appropriate, you will not be charged for this service.\"    Patient has given verbal consent for Video visit? Yes  How would you like to obtain your AVS? MyChart  If you are dropped from the video visit, the video invite should be resent to: Send to e-mail at: [email or web address]  Will anyone else be joining your video visit? No        Video-Visit Details    Type of service:  Video Visit    Video Start Time: 9:34 am  Video End Time: 9:55 am    Originating Location (pt. Location): Home    Distant Location (provider location):  [facility] RHEUMATOLOGY [facility]     Platform used for Video Visit: Thad Lopez MD      Rheumatology Clinic Virtual Visit Note  Zulma Lopez MD  DOS: 12/11/2020  Date of last visit: 9/9/2020    Name: Betty VILLALOBOS" Randal  MRN: [id number]  Age: 80 year old  : 1940  Reason for visit: Follow up visit for R hip pain sec severe OA, PMR, presumed gout flare of L foot, primary Sjogren's syndrome    # Sjogren's syndrome since  with borderline +RG, +SSA, and lip biopsy focus score of 1.  # Follicular bronchiolitis, prior mild ILD   # Osteopenia on DEXA 2019 with T score=-2.1 with decline in bone density  # Chronic prednisone use  # DM  # A fib     Assessment and Plan:    New Dx of PMR. Severe R hip pain is due to severe OA based on 2020 hip MRI. She prefers to avoid hip arthroplastyHer inflammation markers improved on prednisone, PMR responded to prednisone. Recent presumed gout flare (not crystal proven) responded to Tx.    Primary Sjogren's syndrome with ILD   Sister Sita returns with stable PFTs and improvement on most recent chest CT showing bronchiolitis, but no ILD. Completed pulmonary rehab in 2019 where she was able to exercise without oxygen.     On HCQ since 2019 with significant improvement of joint pain.    Tolerates HCQ well.       Plan:    Tramadol 1 tab every 8 hours as needed for pain, please stop if it makes you sleepy, drowsy or causes severe constipation    Prednisone taper: 9-8-7-6 mg a day each for one week then 5 mg a day    Labs on     Follow up with Dr. See    Stay on fosamax weekly    Continue HCQ    Yearly eye exam on HCQ    Return in 2 months        Orders Placed This Encounter   Procedures     CRP inflammation     Erythrocyte sedimentation rate auto     Basic metabolic panel     Routine UA with Micro Reflex to Culture     Uric acid       Video Start Time: 2:36 pm  Video End Time: 2:55 pm    Zulma Lopez MD         HPI:   Betty Tee is a 80 year old WF with a history of primary Sjogren's syndrome, PMR, OA who presents for follow-up. She was diagnosed with Sjogren's syndrome in  with borderline +RG, +SSA, and lip biopsy focus score of 1. Associated ILD and joint  pain are prednisone-responsive which support the diagnosis.     Today 12/11/2020: Sister Betty is on prednisone 10 mg a day, she was given prednisone taper recently for possible foot gout flare which helped, but sharla feels tight and becomes swollen towards the end of the day. Her major complaint is severe R hip pain. Saw ortho, had R hip MRI on 9/5/2020 which showed severe OA, R hip arthroplasty was recommended. She would prefer to delay R hip arthroplasty. She takes tylenol which does not help. NSAIDs are not allowed with CKD. Norco affects her mood. She would like something stronger than tylenol for R hip pain but not norco.        Review of Systems:   A comprehensive ROS was done. Positives are per HPI.      Active Medications:     Outpatient Medications Prior to Visit   Medication Sig Dispense Refill     acetaminophen (TYLENOL) 500 MG tablet Take 1,000 mg by mouth every 8 hours as needed (max 6 tablets/24 hours, 2 tablets/dose)        acyclovir (ZOVIRAX) 5 % external ointment Apply topically 6 times daily As needed for outbreaks 15 g 3     albuterol (PROAIR HFA, PROVENTIL HFA, VENTOLIN HFA) 108 (90 BASE) MCG/ACT inhaler Inhale 2 puffs into the lungs every 6 hours 1 Inhaler 3     alendronate (FOSAMAX) 70 MG tablet Take 1 tablet (70 mg) by mouth every 7 days 12 tablet 1     atorvastatin (LIPITOR) 10 MG tablet TAKE 1/2 TABLET BY MOUTH EVERY DAY 45 tablet 3     azithromycin (ZITHROMAX) 250 MG tablet Take 1 tablet (250 mg) by mouth daily 30 tablet 11     CEVIMELINE 30 MG PO capsule TAKE 1 CAPSULE (30 MG) BY MOUTH 3 TIMES DAILY (Patient taking differently: Take 30 mg by mouth every other day ) 270 capsule 2     fluticasone (FLONASE) 50 MCG/ACT nasal spray Spray 1-2 sprays into both nostrils daily as needed for allergies 18.2 mL 11     fluticasone-vilanterol (BREO ELLIPTA) 100-25 MCG/INH inhaler Inhale 1 puff into the lungs daily 1 Inhaler 11     gabapentin (NEURONTIN) 300 MG capsule Take 1 capsule (300 mg) by mouth  3 times daily 270 capsule 0     HYDROcodone-acetaminophen (NORCO) 7.5-325 MG per tablet Take 1 tablet by mouth every 12 hours 60 tablet 0     hydroxychloroquine (PLAQUENIL) 200 MG tablet Take 2 tablets (400 mg) by mouth daily Annual Plaquenil toxicity eye screening required. 180 tablet 0     insulin aspart (NOVOLOG FLEXPEN) 100 UNIT/ML pen Inject 9 units three times daily under the skin with meals along with sliding scale correction (add one extra unit for every 50 mg/dL over 150) 15 mL 0     insulin glargine (BASAGLAR KWIKPEN) 100 UNIT/ML pen Inject 30 Units Subcutaneous daily 15 mL 1     lidocaine (LIDODERM) 5 % patch Apply patch to painful area for up to 12 h within a 24 h period.  Remove after 12 hours. 30 patch 5     loratadine (CLARITIN) 10 MG tablet TAKE 1 TABLET BY MOUTH EVERY DAY 90 tablet 1     metoprolol succinate ER (TOPROL-XL) 25 MG 24 hr tablet Take 0.5 tablets (12.5 mg) by mouth 2 times daily Take 50 mg by mouth in combination with 12.5 for a total of 62.5 twice a day 90 tablet 3     metoprolol succinate ER (TOPROL-XL) 50 MG 24 hr tablet Take 50 mg by mouth in combination with 12.5 for a total of 62.5 twice a day 180 tablet 3     potassium chloride ER (KLOR-CON) 20 MEQ CR tablet Take 2 tablets (40 mEq) by mouth every morning AND 1 tablet (20 mEq) every evening. 270 tablet 3     rivaroxaban ANTICOAGULANT (XARELTO ANTICOAGULANT) 20 MG TABS tablet Take 1 tablet (20 mg) by mouth daily (with dinner) 90 tablet 2     semaglutide (OZEMPIC, 1 MG/DOSE,) 2 MG/1.5ML pen Inject 1 mg Subcutaneous every 7 days 3 mL 1     spironolactone (ALDACTONE) 25 MG tablet Take 2 tablets (50 mg) by mouth daily 180 tablet 3     torsemide (DEMADEX) 10 MG tablet TAKE ONE TAB (10 MG) WITH ONE 20 MG TAB TO = 30 MG DAILY IN AFTERNOON. 90 tablet 3     torsemide (DEMADEX) 20 MG tablet TAKE 2 TABS (40 MG) IN AM DAILY AND TAKE 1 TAB BY MOUTH IN THE AFTERNOON ALONG W/ A 10 MG  tablet 4     traZODone (DESYREL) 50 MG tablet TAKE  0.5-1.5 TABLETS (25-75 MG) BY MOUTH NIGHTLY AS NEEDED FOR SLEEP 135 tablet 0     vitamin D3 (CHOLECALCIFEROL) 50 mcg (2000 units) tablet Take 1 tablet (50 mcg) by mouth daily 90 tablet 2     escitalopram (LEXAPRO) 20 MG tablet TAKE 1 TABLET BY MOUTH EVERY DAY 90 tablet 0     montelukast (SINGULAIR) 10 MG tablet TAKE 1 TABLET BY MOUTH EVERYDAY AT BEDTIME 90 tablet 0     No facility-administered medications prior to visit.      Allergies:   Augmentin\  Codeine  Phenobarbital  Seasonal allergies      Past Medical History:  Alcohol abuse, in remission.   Allergic rhinitis.   Antiplatelet long-term use.   Atrial fibrillation.   Cardiomegaly.   Osteopenia.   Diverticulosis.   Benign essential hypertension.   GERD.   Insomnia.   Irregular heart beat.   Lumbago.   Major depressive disorder, recurrent episode, moderate.   ANGELICA.  Osteoarthrosis.   Sjogren's syndrome.   Sleep apnea.   Tobacco use disorder.   TMJ disorder.   RLS.  Major depressive disorder.   Hypertension.   Sciatica.   Colouterine fistula.   Left ventricular hypertrophy.   Breat fibroadenoma.   DVT.   Fatty liver disease, nonalcoholic.   Rib pain.   Neck mass.   Interstitial lung disease.   Acute and chronic respiratory failure with hypoxia.   Type II diabetes mellitus.   Hyperlipidemia.   Inflammatory arthritis.      Past Surgical History:  Back surgery.   Left breast biopsy.   Appendectomy.   Exploratory laparotomy.   Cardiac surgery.   Cholecystectomy.   Left colectomy.   Total abdominal hysterectomy with bilateral salpingo-oophorectomy.   Insert ureter stent.   Flexible sigmoidoscopy.   Ileostomy takedown.     Family History:   Mother: Positive for CAD, heart disease, hypertension, cerebrovascular disease, hyperlipidemia.   Father: Positive for alcohol/drug abuse, Alzheimer disease, dementia, hypertension, hyperlipidemia.   Sister: Positive for CAD, hypertension, and colorectal cancer.   Sister: Positive for hypertension and hyperlipidemia.   Sister:  Positive for diabetes, lung cancer, asthma, and heart disease.   Brother: Positive for Parkinsonism and substance abuse.   Brother: Positive for hypertension, diverticulitis, and prostate cancer.   Daughter: Positive for breast cancer.        Social History:   She is a former smoker; quit in 2011. She has a history of alcohol abuse but stopped drinking in 1986.      Physical Exam:   Constitutional: NAD, pleasant  Eyes: Normal EOM, conjunctiva, sclera  MS: mild swelling of L foot, painful ROM of R hip  Skin: No alopecia, rash  Neuro: grossly non-focal  Psych: Normal affect.    Images:  CT Chest w/o contrast (7/25/18):  Findings remain most consistent with small airway disease  and/or nonclassifiable interstitial lung disease and are not  significantly changed from the March 2017 comparison.    Per radiology.    Laboratory:   RHEUM RESULTS Latest Ref Rng & Units 10/20/2020 12/4/2020 1/5/2021   COMPLEMENT C3 81 - 157 mg/dL - - -   COMPLEMENT C4 13 - 39 mg/dL - - -   SED RATE 0 - 30 mm/h - 15 -   CRP, INFLAMMATION 0.0 - 8.0 mg/L - 15.0(H) -   CK TOTAL 30 - 225 U/L - - -   RHEUMATOID FACTOR <12 IU/mL - - -   RG SCREEN BY EIA <1.0 - - -   AST 0 - 45 U/L - 18 -   ALT 0 - 50 U/L - 20 -   ALBUMIN 3.4 - 5.0 g/dL - 3.1(L) -   WBC 4.0 - 11.0 10e9/L - 10.3 -   RBC 3.8 - 5.2 10e12/L - 4.38 -   HGB 11.7 - 15.7 g/dL - 13.2 -   HCT 35.0 - 47.0 % - 41.2 -   MCV 78 - 100 fl - 94 -   MCHC 31.5 - 36.5 g/dL - 32.0 -   RDW 10.0 - 15.0 % - 15.0 -    - 450 10e9/L - 189 -   CREATININE 0.52 - 1.04 mg/dL 1.16(H) 1.49(H) 1.10(H)   GFR ESTIMATE, IF BLACK >60 mL/min/[1.73:m2] 51(L) 38(L) 55(L)   GFR ESTIMATE >60 mL/min/[1.73:m2] 44(L) 33(L) 47(L)    - 1,616 mg/dL - - -   IGA 84 - 499 mg/dL - - -   IGM 35 - 242 mg/dL - - -       Rheumatoid Factor   Date Value Ref Range Status   07/24/2015 <20 <20 IU/mL Final   ,  ,   Cyclic Cit Pept IgG/IgA   Date Value Ref Range Status   07/24/2015 <20  Interpretation:  Negative   <20 UNITS Final    ,  ,   Scleroderma Antibody Scl-70 CECILIA IgG   Date Value Ref Range Status   07/24/2015  0.0 - 0.9 AI Final    <0.2  Negative   Antibody index (AI) values reflect qualitative changes in antibody   concentration that cannot be directly associated with clinical condition or   disease state.       SSA (Ro) (CECILIA) Antibody, IgG   Date Value Ref Range Status   07/24/2015 1.6 (H) 0.0 - 0.9 AI Final     Comment:     Positive   Antibody index (AI) values reflect qualitative changes in antibody   concentration that cannot be directly associated with clinical condition or   disease state.       SSB (La) (CECILIA) Antibody, IgG   Date Value Ref Range Status   07/24/2015  0.0 - 0.9 AI Final    <0.2  Negative   Antibody index (AI) values reflect qualitative changes in antibody   concentration that cannot be directly associated with clinical condition or   disease state.       ,  ,   RG Screen by EIA   Date Value Ref Range Status   07/24/2015 <1.0  Interpretation:  Negative   <1.0 Final   ,  ,  ,  ,  ,  ,  ,  ,  ,  ,  ,  ,  ,  ,  ,  ,  ,  ,   Neutrophil Cytoplasmic IgG Antibody   Date Value Ref Range Status   07/24/2015   Final    <1:20  Reference range: <1:20  (Note)  The ANCA IFA is <1:20; therefore, no further testing will  be performed.  INTERPRETIVE INFORMATION: Anti-Neutrophil Cyto Ab, IgG  Neutrophil Cytoplasmic Antibodies (C-ANCA = granular  cytoplasmic staining, P-ANCA = perinuclear staining) are  found in the serum of over 90 percent of patients with  certain necrotizing systemic vasculitides, and usually in  less than 5 percent of patients with collagen vascular  disease or arthritis.  Performed by [facility],  [address] [place] [id number] [phone]  [email or web address], Burke Mckeon MD, Lab. Director       ,  ,  ,  ,  ,  ,  ,   IGG   Date Value Ref Range Status   07/24/2015 1320 695 - 1620 mg/dL Final   ,  ,  ,  ,  ,   Scleroderma Antibody Scl-70 CECILIA IgG   Date Value Ref Range Status   07/24/2015  0.0 - 0.9 AI  Final    <0.2  Negative   Antibody index (AI) values reflect qualitative changes in antibody   concentration that cannot be directly associated with clinical condition or   disease state.       Component      Latest Ref Rng & Units 12/4/2020   WBC      4.0 - 11.0 10e9/L 10.3   RBC Count      3.8 - 5.2 10e12/L 4.38   Hemoglobin      11.7 - 15.7 g/dL 13.2   Hematocrit      35.0 - 47.0 % 41.2   MCV      78 - 100 fl 94   MCH      26.5 - 33.0 pg 30.1   MCHC      31.5 - 36.5 g/dL 32.0   RDW      10.0 - 15.0 % 15.0   Platelet Count      150 - 450 10e9/L 189   % Neutrophils      % 72.8   % Lymphocytes      % 14.4   % Monocytes      % 10.6   % Eosinophils      % 1.7   % Basophils      % 0.5   Absolute Neutrophil      1.6 - 8.3 10e9/L 7.5   Absolute Lymphocytes      0.8 - 5.3 10e9/L 1.5   Absolute Monocytes      0.0 - 1.3 10e9/L 1.1   Absolute Eosinophils      0.0 - 0.7 10e9/L 0.2   Absolute Basophils      0.0 - 0.2 10e9/L 0.1   Diff Method       Automated Method   Sodium      133 - 144 mmol/L 138   Potassium      3.4 - 5.3 mmol/L 3.6   Chloride      94 - 109 mmol/L 105   Carbon Dioxide      20 - 32 mmol/L 27   Anion Gap      3 - 14 mmol/L 6   Glucose      70 - 99 mg/dL 176 (H)   Urea Nitrogen      7 - 30 mg/dL 16   Creatinine      0.52 - 1.04 mg/dL 1.49 (H)   GFR Estimate      >60 mL/min/1.73:m2 33 (L)   GFR Estimate If Black      >60 mL/min/1.73:m2 38 (L)   Calcium      8.5 - 10.1 mg/dL 9.4   Bilirubin Total      0.2 - 1.3 mg/dL 0.6   Albumin      3.4 - 5.0 g/dL 3.1 (L)   Protein Total      6.8 - 8.8 g/dL 7.3   Alkaline Phosphatase      40 - 150 U/L 95   ALT      0 - 50 U/L 20   AST      0 - 45 U/L 18   Sed Rate      0 - 30 mm/h 15   CRP Inflammation      0.0 - 8.0 mg/L 15.0 (H)       Again, thank you for allowing me to participate in the care of your patient.      Sincerely,    Zulma Lopez MD

## 2020-12-14 DIAGNOSIS — F33.1 MAJOR DEPRESSIVE DISORDER, RECURRENT EPISODE, MODERATE (H): ICD-10-CM

## 2020-12-15 RX ORDER — ACYCLOVIR 400 MG/1
400 TABLET ORAL 3 TIMES DAILY
Qty: 15 TABLET | Refills: 2 | Status: SHIPPED | OUTPATIENT
Start: 2020-12-15 | End: 2021-06-04

## 2020-12-15 NOTE — TELEPHONE ENCOUNTER
"CW,    Routing refill request to provider for review/approval because:  Drug not active on patient's medication list    Acyclovir 5% external ointment is on current medication list.  Please advise.  Thanks,  Sophia Hemphill RN    Requested Prescriptions   Pending Prescriptions Disp Refills     acyclovir (ZOVIRAX) 400 MG tablet [Pharmacy Med Name: ACYCLOVIR 400 MG TABLET] 15 tablet 2     Sig: TAKE 1 TABLET (400 MG) BY MOUTH 3 TIMES DAILY FOR A COUPLE DAYS       Antivirals for Herpes Protocol Failed - 12/11/2020 10:41 PM        Failed - Medication is active on med list        Failed - Normal serum creatinine on file in past 12 months     Recent Labs   Lab Test 12/04/20  1435 04/02/17  2213 04/02/17  2213   CR 1.49*   < >  --    CREAT  --   --  0.6    < > = values in this interval not displayed.       Ok to refill medication if creatinine is low          Passed - Patient is age 12 or older        Passed - Recent (12 mo) or future (30 days) visit within the authorizing provider's specialty     Patient has had an office visit with the authorizing provider or a provider within the authorizing providers department within the previous 12 mos or has a future within next 30 days. See \"Patient Info\" tab in inbasket, or \"Choose Columns\" in Meds & Orders section of the refill encounter.                       "

## 2020-12-16 ENCOUNTER — MYC MEDICAL ADVICE (OUTPATIENT)
Dept: FAMILY MEDICINE | Facility: CLINIC | Age: 80
End: 2020-12-16

## 2020-12-16 ENCOUNTER — TELEPHONE (OUTPATIENT)
Dept: PALLIATIVE MEDICINE | Facility: CLINIC | Age: 80
End: 2020-12-16

## 2020-12-16 DIAGNOSIS — M25.551 HIP PAIN, RIGHT: Primary | ICD-10-CM

## 2020-12-16 RX ORDER — ESCITALOPRAM OXALATE 20 MG/1
TABLET ORAL
Qty: 90 TABLET | Refills: 0 | Status: SHIPPED | OUTPATIENT
Start: 2020-12-16 | End: 2021-03-11

## 2020-12-16 NOTE — TELEPHONE ENCOUNTER
Called pt.  Pt would like injection for R hip pain.  Has not seen Dr. Moore for this area (I believe), but did see Ortho at [facility].  Dx severe DJD, rec PT and/or injections, but said she is likely heading towards replacement.  Will place referral order for R hip steroid injection with Dr. Moore.  Thanks- CW

## 2020-12-16 NOTE — TELEPHONE ENCOUNTER
Shoulder joint injection orders signed for pt for Dr. Moore at pain clinic.  He has seen and evaluated her- requested specific injection order.  Now placed-- thanks- CW

## 2020-12-16 NOTE — TELEPHONE ENCOUNTER
Called pt to schedule Left Shoulder Joint Injection. Pt states she was supposed to be referred for a hip injection.     Routing back to referring provider as PAXTON ESQUIVEL    [facility]

## 2020-12-17 ENCOUNTER — TELEPHONE (OUTPATIENT)
Dept: PHARMACY | Facility: CLINIC | Age: 80
End: 2020-12-17

## 2020-12-17 ASSESSMENT — PATIENT HEALTH QUESTIONNAIRE - PHQ9
SUM OF ALL RESPONSES TO PHQ QUESTIONS 1-9: 4
SUM OF ALL RESPONSES TO PHQ QUESTIONS 1-9: 4
10. IF YOU CHECKED OFF ANY PROBLEMS, HOW DIFFICULT HAVE THESE PROBLEMS MADE IT FOR YOU TO DO YOUR WORK, TAKE CARE OF THINGS AT HOME, OR GET ALONG WITH OTHER PEOPLE: NOT DIFFICULT AT ALL

## 2020-12-17 NOTE — TELEPHONE ENCOUNTER
"Called patient to check on blood sugars. Currently taking Ozempic 1mg weekly, Basaglar 30 units daily, and Novolog 9 units three times daily with meals along with sliding scale (1 extra unit for every 50 mg/dL over 150). She is adding 2-3 units from the sliding scale.     Blood sugar readings: 134 mg/dL this morning. At noon it has been in low 200's (204). Before dinner it is also \"around that area, or a little bit below).      Plan: Increase mealtime insulin to 10 units plus sliding scale. No change in Basaglar. Will f/u next week.     Pavithra Oconnell, PharmD  Medication Therapy Management Resident  Sabrina Meek, PharmD, JAMES, BCACP   Medication Therapy Management Pharmacist      "

## 2020-12-17 NOTE — TELEPHONE ENCOUNTER
Called pt to schedule Right Hip Joint Injection. Pt is confused as to why she is having an injection done. Instructed pt to reach out to referring provider and clarify. Pt stated understanding and will call PCP.    Mya ESQUIVEL    [facility]

## 2020-12-18 ASSESSMENT — PATIENT HEALTH QUESTIONNAIRE - PHQ9: SUM OF ALL RESPONSES TO PHQ QUESTIONS 1-9: 4

## 2020-12-21 ENCOUNTER — TELEPHONE (OUTPATIENT)
Dept: FAMILY MEDICINE | Facility: CLINIC | Age: 80
End: 2020-12-21

## 2020-12-21 DIAGNOSIS — M25.551 HIP PAIN, RIGHT: Primary | ICD-10-CM

## 2020-12-21 DIAGNOSIS — M19.90 OSTEOARTHRITIS, UNSPECIFIED OSTEOARTHRITIS TYPE, UNSPECIFIED SITE: ICD-10-CM

## 2020-12-21 DIAGNOSIS — M19.90 INFLAMMATORY ARTHRITIS: ICD-10-CM

## 2020-12-21 DIAGNOSIS — M25.50 ARTHRALGIA, UNSPECIFIED JOINT: ICD-10-CM

## 2020-12-21 NOTE — TELEPHONE ENCOUNTER
Bella,  Please see below.  Can pt get a referral for pain management with ?  Please advise.  Thanks,  Radha Otero RN

## 2020-12-21 NOTE — TELEPHONE ENCOUNTER
Reason for Call: Request for an order or referral:    Order or referral being requested: Referral    Date needed: as soon as possible    Has the patient been seen by the PCP for this problem? YES    Additional comments: Patient's healthcare agent is calling in today in regards to needing an order for pain management with . Can a referral be sent over? Thank you    Phone number Patient can be reached at:  Home number on file [phone] (home)    Best Time:  anytime    Can we leave a detailed message on this number?  YES    Call taken on 12/21/2020 at 4:09 PM by Beatriz Velasquez

## 2020-12-22 NOTE — TELEPHONE ENCOUNTER
Ken Churchill- Dr. Bran would need to do a referral to the [facility]. Dr. Moore I know works out of the [facility] not sure where else is goes. The main number to the pain clinic is [phone]. Thanks    Bella Alcantara  Referral Coordinator

## 2020-12-22 NOTE — TELEPHONE ENCOUNTER
Called pt because after my phone discussion with wendy on 12/16/20, I thought she was wanting an injection in her R hip (which is what was recommended by ortho), so referral was placed for an injection.    Discussed again with Sister Betty.  She would like to talk to Dr. Moore about her right hip pain first- a consultation.  If he thinks she should get an injection, she'll take one, but wanted to discuss it with him first.    Will place a consult referral for Dr. Moore for R hip pain, and general pain management as she has multiple pain issues.  Thanks- CW

## 2020-12-23 ENCOUNTER — TELEPHONE (OUTPATIENT)
Dept: PALLIATIVE MEDICINE | Facility: CLINIC | Age: 80
End: 2020-12-23

## 2020-12-23 NOTE — TELEPHONE ENCOUNTER
RAGHU with [facility] to schedule appointment for pt at [phone]. Writer went through questions and policies with pt first.          Pain Management Center Referral      1. Confirmed address with patient? Yes  2. Confirmed phone number with patient? Yes  3. Confirmed referring provider? Yes  4. Is the PCP the same as the referring provider? Yes  5. Has the patient been to any previous pain clinics? No  (If yes, send ELICIA with welcome letter)  6. Which insurance are we to bill for this appointment?  Medicare/ BCBS    7. Informed pt of cancellation (48 hour) policy? Yes    REGARDING OPIOID MEDICATIONS: We will always address appropriateness of opioid pain medications, but we generally will not automatically take on a prescribing role. When we do take on prescribing of opioids for chronic pain, it is in collaboration with the referring physician for an intermediate period of time (months), with an expectation that the primary physician or provider will assume the prescribing role if medications are effective at stable doses with demonstrated compliance. Therefore, please do not assume that your prescribing responsibilities end on the day of pain clinic consultation.  8. Informed pt of prescribing policy? Yes    9.Please be aware that once you are established with a pain provider and location, you will need to continue have all future visits with that provider and location. It is best to determine what location is the most convenient for you and schedule with that one.    ** PATIENT INFORMED OF THIS POLICY Yes      9. Referring Provider: Ilene Tristan     10. Criteria for Triage Eval:   -Missed/Failed 1st appointment? N/A     -Medication Focused? N/A     -Mental Health Concerns? (e.g. Recent psych hospitalization/snap shot)? N/A     -Active substance abuse? N/A     -Patient behaviors (e.g. Offensive language/raised voice)? N/A    Mya ESQUIVEL    [facility]

## 2020-12-31 ENCOUNTER — TELEPHONE (OUTPATIENT)
Dept: PHARMACY | Facility: CLINIC | Age: 80
End: 2020-12-31

## 2020-12-31 NOTE — TELEPHONE ENCOUNTER
Called pt to f/u on SMBG and insulin doses.     SMBG - AM are under 140 but readings go up after she has eaten. States some of this is due to [employer] goodies.      Foot is feeling better - a little bit of pain towards the evening but tolerable. Currently taking prednisone 7mg daily and is actively titrating down on this.     Will hold on adjustments today - pt is coming in to follow-up with PCP on 1/5, asked her to bring glucometer to that appointment and MTM will follow-up with patient in clinic.     Sabrina Meek, PharmD, JAMES, BCACP  MTM Pharmacist, [facility]

## 2021-01-05 ENCOUNTER — OFFICE VISIT (OUTPATIENT)
Dept: FAMILY MEDICINE | Facility: CLINIC | Age: 81
End: 2021-01-05
Payer: MEDICARE

## 2021-01-05 ENCOUNTER — OFFICE VISIT (OUTPATIENT)
Dept: PHARMACY | Facility: CLINIC | Age: 81
End: 2021-01-05
Payer: COMMERCIAL

## 2021-01-05 VITALS
HEIGHT: 67 IN | OXYGEN SATURATION: 95 % | HEART RATE: 82 BPM | DIASTOLIC BLOOD PRESSURE: 77 MMHG | BODY MASS INDEX: 32.33 KG/M2 | SYSTOLIC BLOOD PRESSURE: 120 MMHG | TEMPERATURE: 96.6 F | WEIGHT: 206 LBS

## 2021-01-05 DIAGNOSIS — R19.5 LOOSE STOOLS: ICD-10-CM

## 2021-01-05 DIAGNOSIS — M35.02 SJOGREN'S SYNDROME WITH LUNG INVOLVEMENT (H): ICD-10-CM

## 2021-01-05 DIAGNOSIS — M16.11 OSTEOARTHRITIS OF RIGHT HIP, UNSPECIFIED OSTEOARTHRITIS TYPE: ICD-10-CM

## 2021-01-05 DIAGNOSIS — M25.551 HIP PAIN, RIGHT: ICD-10-CM

## 2021-01-05 DIAGNOSIS — N18.32 STAGE 3B CHRONIC KIDNEY DISEASE (H): Primary | ICD-10-CM

## 2021-01-05 DIAGNOSIS — E11.65 TYPE 2 DIABETES MELLITUS WITH HYPERGLYCEMIA, WITH LONG-TERM CURRENT USE OF INSULIN (H): Primary | ICD-10-CM

## 2021-01-05 DIAGNOSIS — R19.7 DIARRHEA, UNSPECIFIED TYPE: ICD-10-CM

## 2021-01-05 DIAGNOSIS — N39.41 URGE INCONTINENCE OF URINE: ICD-10-CM

## 2021-01-05 DIAGNOSIS — E11.65 TYPE 2 DIABETES MELLITUS WITH HYPERGLYCEMIA, WITH LONG-TERM CURRENT USE OF INSULIN (H): ICD-10-CM

## 2021-01-05 DIAGNOSIS — I10 ESSENTIAL HYPERTENSION WITH GOAL BLOOD PRESSURE LESS THAN 140/90: ICD-10-CM

## 2021-01-05 DIAGNOSIS — Z79.4 TYPE 2 DIABETES MELLITUS WITH HYPERGLYCEMIA, WITH LONG-TERM CURRENT USE OF INSULIN (H): Primary | ICD-10-CM

## 2021-01-05 DIAGNOSIS — Z79.4 TYPE 2 DIABETES MELLITUS WITH HYPERGLYCEMIA, WITH LONG-TERM CURRENT USE OF INSULIN (H): ICD-10-CM

## 2021-01-05 PROCEDURE — 99215 OFFICE O/P EST HI 40 MIN: CPT | Performed by: FAMILY MEDICINE

## 2021-01-05 PROCEDURE — 80048 BASIC METABOLIC PNL TOTAL CA: CPT | Performed by: FAMILY MEDICINE

## 2021-01-05 PROCEDURE — 99607 MTMS BY PHARM ADDL 15 MIN: CPT | Performed by: PHARMACIST

## 2021-01-05 PROCEDURE — 99605 MTMS BY PHARM NP 15 MIN: CPT | Performed by: PHARMACIST

## 2021-01-05 PROCEDURE — 36415 COLL VENOUS BLD VENIPUNCTURE: CPT | Performed by: FAMILY MEDICINE

## 2021-01-05 RX ORDER — PREDNISONE 5 MG/1
1 TABLET ORAL DAILY
COMMUNITY
Start: 2020-10-07 | End: 2021-02-06

## 2021-01-05 RX ORDER — PREDNISONE 1 MG/1
1 TABLET ORAL DAILY
COMMUNITY
Start: 2020-08-17 | End: 2021-02-06

## 2021-01-05 ASSESSMENT — MIFFLIN-ST. JEOR: SCORE: 1437.04

## 2021-01-05 NOTE — PROGRESS NOTES
Assessment & Plan       ICD-10-CM    1. Stage 3b chronic kidney disease  N18.32 Basic metabolic panel  (Ca, Cl, CO2, Creat, Gluc, K, Na, BUN)   2. Essential hypertension with goal blood pressure less than 140/90  I10 Basic metabolic panel  (Ca, Cl, CO2, Creat, Gluc, K, Na, BUN)   3. Type 2 diabetes mellitus with hyperglycemia, with long-term current use of insulin (H)  E11.65 Basic metabolic panel  (Ca, Cl, CO2, Creat, Gluc, K, Na, BUN)    Z79.4    4. Loose stools  R19.5    5. Hip pain, right  M25.551    6. Osteoarthritis of right hip, unspecified osteoarthritis type  M16.11    7. Sjogren's syndrome with lung involvement (H)  M35.02    8. Urge incontinence of urine  N39.41         CKD 3B/HTN/DM II-   GFR down to 33 at last check.  Has ranged usually from 40-60s, last worst level was 39.  No known etiology for worsening levels- will recheck today.  If still this low, will send on to renal for consultation.    DM II- borderline control, see MTM notes from today for details.    Loose stools- after bigger meals.  Sounds like more recent sx's, similar to when she was on metformin.  MTM does not think due to ozempic, though timing may fit best- could be multiple medications.  Will cont to monitor sx's.    R hip/leg pain- unsure exact etiology- she does have R hip DJD, though sx's are described today more like sciatic sx's, burning pain wrapping around R hip and back of R leg.  Pt did see ortho in past, and does have consultation coming up with Dr. Moore for his thoughts.  She is not a great candidate for surgery given her multiple issues, but is very wary of injections due to past experience.  She is also wary of pain medications due to her h/o etoh abuse.    Sjogrens- Weaning down on prednisone from 10mg/d to 5mg/d with Dr. Lopez.  Seems to be tolerating okay, though difficult to tell given her multiple symptoms.    Urge incontinence- reviewed Dr. Nassar's recommendations from a year ago.  Cont diuretic dosing-  helpful.  Rec urinating more frequently.  Can send her back to Dr. Nassar if sx's persist/worsen despite these efforts.      Review of external notes as documented above     60 minutes spent on the date of the encounter doing chart review, review of outside records, review of test results, patient visit and documentation     Return in about 2 months (around 3/5/2021) for Routine Visit/Chronic Cares/Med Check.    Ilene Tristan MD  [facility]     Betty Tee is a 80 year old who presents to clinic today for the following health issues  accompanied by her friend:    HPI       Kidney function orders.    CKD 3B- main reason for pulling pt back to see her today.  GFR- 40-50s for awhile, but down to 44 on 10/20/20, and 33 on most recent check on 12/4/20.  Unsure why.  HTN in good control, DM a bit worse, but not significantly.  No recent illnesses/hospitalizations other than her usual significant chronic conditions. Hydrates well with her usual habits.      Other concerns...  --R leg and hip are hurting a lot.  Today pt describes the pain a burning  pain going around lateral hip area, and down back of leg.  She has been dx with severe DJD of R hip, but could also be sciatic symptoms.  She is wary of injections for pain- had horrible experience the last time.  She does have an appt set up with Dr. Moore to discuss options later this week.    She saw Dr. Lopez recently, who gave her pain medication for the R hip/leg symptoms.  Pt hadn't used the plls (tramadol and norco) until yesterday (tries to avoid due to her h/o etoh abuse many yrs ago).  She relented yesterday and did take tramadol in am, and norco at 6:30pm.  They affect her head, but they don't help the pain much at all.  Didn't take the pain medications today.  Her rule to herself is to call her friend and tell her before taking a pain medication so she holds herself accountable.  She does not think her  "pain has       --Rheum-   Dx through rheumatology has been h/o Sjogrens with 'prednisone responsive joint pain'.    Prednisone 6mg/d this week, 5mg starting this coming Sunday.  She was at 9mg 12/13 week.  Down 1mg/week since then.  She doesn't think hse has worsening generalized jt pains.      --Bowel urgency after regular meals (fine after light lunches).  Rushing to the bathroom after certain meals- loose stools.  Happens mostly when she has a good meal (not with a light lunch).  Going on for 2-3 months.  Feels similar to what she remembers from the metformin.    --Bladder concerns- she wears a pad every night.  Sometimes can't get to bathroom in time.  Did see Dr. Nassar about a year ago, and switching up diuretic dosing helped.  Torsemide- 30mg qam, 40mg qpm.  She also rec pt try to urinate hourly- pt tries, but doesn't go that frequently.    --Mild dizziness-   Sometimes when she turns, she gets dizzy.  Usually when she turns, not looking up/down or getting up.  No vertigo, more woozy.          Review of Systems   Constitutional, HEENT, cardiovascular, pulmonary, GI, , musculoskeletal, neuro, skin, endocrine and psych systems are negative, except as otherwise noted.        Objective    /77   Pulse 82   Temp 96.6  F (35.9  C) (Tympanic)   Ht 1.702 m (5' 7\")   Wt 93.4 kg (206 lb)   SpO2 95%   BMI 32.26 kg/m    Body mass index is 32.26 kg/m .  Physical Exam   GENERAL APPEARANCE: healthy, alert and no distress     EYES: PERRL, sclera clear     HENT: nose and mouth without ulcers or lesions     NECK: no adenopathy, no asymmetry, masses, or scars and thyroid normal to palpation     RESP: lungs clear to auscultation - no rales, rhonchi or wheezes     CV: regular rates and rhythm, normal S1 S2, no S3 or S4 and no murmur, click or rub      Ext: warm, dry, no edema of bilateral lower legs    Office Visit on 12/04/2020   Component Date Value Ref Range Status     Sodium 12/04/2020 138  133 - 144 mmol/L Final "     Potassium 12/04/2020 3.6  3.4 - 5.3 mmol/L Final     Chloride 12/04/2020 105  94 - 109 mmol/L Final     Carbon Dioxide 12/04/2020 27  20 - 32 mmol/L Final     Anion Gap 12/04/2020 6  3 - 14 mmol/L Final     Glucose 12/04/2020 176* 70 - 99 mg/dL Final     Urea Nitrogen 12/04/2020 16  7 - 30 mg/dL Final     Creatinine 12/04/2020 1.49* 0.52 - 1.04 mg/dL Final     GFR Estimate 12/04/2020 33* >60 mL/min/[1.73_m2] Final    Comment: Non  GFR Calc  Starting 12/18/2018, serum creatinine based estimated GFR (eGFR) will be   calculated using the Chronic Kidney Disease Epidemiology Collaboration   (CKD-EPI) equation.       GFR Estimate If Black 12/04/2020 38* >60 mL/min/[1.73_m2] Final    Comment:  GFR Calc  Starting 12/18/2018, serum creatinine based estimated GFR (eGFR) will be   calculated using the Chronic Kidney Disease Epidemiology Collaboration   (CKD-EPI) equation.       Calcium 12/04/2020 9.4  8.5 - 10.1 mg/dL Final     Bilirubin Total 12/04/2020 0.6  0.2 - 1.3 mg/dL Final     Albumin 12/04/2020 3.1* 3.4 - 5.0 g/dL Final     Protein Total 12/04/2020 7.3  6.8 - 8.8 g/dL Final     Alkaline Phosphatase 12/04/2020 95  40 - 150 U/L Final     ALT 12/04/2020 20  0 - 50 U/L Final     AST 12/04/2020 18  0 - 45 U/L Final     Sed Rate 12/04/2020 15  0 - 30 mm/h Final     CRP Inflammation 12/04/2020 15.0* 0.0 - 8.0 mg/L Final     WBC 12/04/2020 10.3  4.0 - 11.0 10e9/L Final     RBC Count 12/04/2020 4.38  3.8 - 5.2 10e12/L Final     Hemoglobin 12/04/2020 13.2  11.7 - 15.7 g/dL Final     Hematocrit 12/04/2020 41.2  35.0 - 47.0 % Final     MCV 12/04/2020 94  78 - 100 fl Final     MCH 12/04/2020 30.1  26.5 - 33.0 pg Final     MCHC 12/04/2020 32.0  31.5 - 36.5 g/dL Final     RDW 12/04/2020 15.0  10.0 - 15.0 % Final     Platelet Count 12/04/2020 189  150 - 450 10e9/L Final     % Neutrophils 12/04/2020 72.8  % Final     % Lymphocytes 12/04/2020 14.4  % Final     % Monocytes 12/04/2020 10.6  % Final      % Eosinophils 12/04/2020 1.7  % Final     % Basophils 12/04/2020 0.5  % Final     Absolute Neutrophil 12/04/2020 7.5  1.6 - 8.3 10e9/L Final     Absolute Lymphocytes 12/04/2020 1.5  0.8 - 5.3 10e9/L Final     Absolute Monocytes 12/04/2020 1.1  0.0 - 1.3 10e9/L Final     Absolute Eosinophils 12/04/2020 0.2  0.0 - 0.7 10e9/L Final     Absolute Basophils 12/04/2020 0.1  0.0 - 0.2 10e9/L Final     Diff Method 12/04/2020 Automated Method   Final

## 2021-01-05 NOTE — PROGRESS NOTES
Medication Therapy Management (MTM) Encounter    ASSESSMENT/PLAN:                            Medication Adherence/Access: No issues identified    Diarrhea: Reviewed patients medication regimen for meds that can cause diarrhea.  Unfortunately, this is a side effect of nearly everything she is currently taking. Given that it's very difficult to establish a timeline of her symptoms it's difficult to determine if this is related to her meds or related to her colon removal surgery (or both).   Acyclovir: 2.4-3.2%  Alendronate: 0.6-3.1%  Atorvastatin: up to 14.1% (not reported with rosuvastatin)  Azithromycin: Unclear rates with her daily dosing, but possibility  Cevimeline: 10.3%  Escitalopram: 6-14%  Gabapentin: 3.3-5.7%  Hydroxychloroquine: post-marketing reports, previous SE for Betty  Metoprolol: >2%  Potassium supplements: Per [employer]: All formulations of potassium chloride supplements cause gastrointestinal irritation. According to most studies, the ranking of potassium formulations in decreasing propensity to cause gastrointestinal irritation and more severe side effects is liquid/rapid-release tablets; wax-matrix; microencapsulated   Montelukast: 2% or greater in pediatric population  Spironolactone: reported, unclear frequency  Torsemide: 2%  Trazodone: up to 9%  Ozempic: 8.5-10% with most cases occurring during dose escalation    Type 2 Diabetes: Fasting sugars are still a bit higher than goal, as are post prandial sugars, however, A1c is <9% which given her age and comorbidities is a reasonable goal.    We had a long discussion today about Ozempic and if this is contributing to her diarrhea (although the timing doesn't quite work out). Betty feels that Ozempic is helpful both for helping to control her appetite/weight while on Prednisone as well as diabetes management and doesn't want to stop this if it's helping her.   If Ozempic is stopped, increasing insulin is our only option for treating diabetes  "(TZD contraindicated due to HF, SFU, DPP4i not likely to be effective at this point, SGLT2i worsened renal function and her renal function is already borderline for their use, Metformin may muddy perez with her diarrhea, but also would be of risk due to HF dx and age).     PLAN:   No changes today - will huddle with provider about other medications that cause diarrhea to see if there is anything we can do.     Will follow up in 1 week for SMBG check and possible insulin adjustment.  Not discussed due to time today, but she may be a good candidate for CGM, but has declined this in the past.     SUBJECTIVE/OBJECTIVE:                           Betty Tee is a 80 year old female coming in for a follow-up visit. She was referred to me from Dr. Tristan (whom she saw immediately prior to our visit today). Patient was accompanied by Yvette, her friend who helps take notes during visits. . Today's visit is a follow-up MTM visit from 11/5/20 (however there have been several phone calls between for insulin adjustments).      Reason for visit: f/u on glucose values.  Dr. Tristan expresses some concern about Betty's diarrhea.     Allergies/ADRs: Reviewed in chart  Tobacco: She reports that she quit smoking about 9 years ago. Her smoking use included cigarettes. She has a 5.00 pack-year smoking history. She has never used smokeless tobacco.  Alcohol: not currently using  Past Medical History: Reviewed in chart    Medication Adherence/Access: no issues reported    Diarrhea: Betty states this isn't \"diarrhea,\" but rather urgency of BM.  She eats a large meal and then feels like she has to go right away. Describes it as \"explosive,\" and is a solid BM followed by looser stools. No pain/discomfort, at times she states there is cramping and other times states there is not.  Reports that this happens with no consistent pattern and doesn't occur daily. Difficult for her to quantify how often it occurs. Does not occur at the same " "time of the day.  The only thing she can link it to is that it occurs after a \"good\" meal, meaning a larger meal. She does not believe that it occurs due to what she is eating, maybe the quantity, but she's not sure on that either. States she is good about not overeating. Betty cannot tell me how long this has been occurring. She reports she's had issues with her bowels for many years - of note in 2005 she had part of her colon removed, had an ostomy bag for a while, and then the procedure was reversed to get rid of the bag. In the past it was thought that Metformin may have been causing/contributing to the issue and things may have improved for a while after stopping this, but she's not really sure.   Chart review shows complaints of diarrhea going back several years. Of note, in a note from Dr. Navarrete on 10/6/15 he reports that she had tried and failed Plaquenil due to diarrhea. She is currently taking Plaquenil.   Additionally she recently increased her cevimeline from 1 tablet a day to 1 tablet twice daily, however she doesn't feel that her symptoms have worsened since making this change.    Type 2 Diabetes:  Currently taking basaglar 30 units daily, Ozempic 1mg weekly, Novolog 10 units plus sliding scale (1 unit for every 50 mg/dL over 150mg/dL). Potential SE as noted above (diarrhea) otherwise tolerating well.   Blood sugar monitoring: 3 time(s) daily. Ranges (based on glucometer readings): See below  Symptoms of low blood sugar? none  Symptoms of high blood sugar? none  Diet/Exercise: unchanged from previous visits.   Aspirin: Not taking - currently on Xarelto, concern for bleed risk.   Statin: Yes: atorvastatin 5mg daily (LDL at last check was 19 so dose has not been escalated per guidelines)  ACEi/ARB: No - stopped due to low BP by cardiology in 2019.  Urine albumin remains elevated at 40.55 at last check, but is actually improved over the year prior when it was >100.     BP Readings from Last 1 " "Encounters:   01/05/21 120/77     Pulse Readings from Last 1 Encounters:   01/05/21 82     Wt Readings from Last 1 Encounters:   01/05/21 206 lb (93.4 kg)     Ht Readings from Last 1 Encounters:   01/05/21 5' 7\" (1.702 m)     Estimated body mass index is 32.26 kg/m  as calculated from the following:    Height as of an earlier encounter on 1/5/21: 5' 7\" (1.702 m).    Weight as of an earlier encounter on 1/5/21: 206 lb (93.4 kg).    Temp Readings from Last 1 Encounters:   01/05/21 96.6  F (35.9  C) (Tympanic)     I spent 30 minutes with this patient today. All changes were made via collaborative practice agreement with Dr. Tristan. A copy of the visit note was provided to the patient's primary care provider.    The patient declined a summary of these recommendations.     Sabrina Meek, Pharm.D., M.B.A., [facility]  [facility] Pharmacist, [facility]              Medication Therapy Recommendations  No medication therapy recommendations to display      "

## 2021-01-06 LAB
ANION GAP SERPL CALCULATED.3IONS-SCNC: 7 MMOL/L (ref 3–14)
BUN SERPL-MCNC: 16 MG/DL (ref 7–30)
CALCIUM SERPL-MCNC: 8.9 MG/DL (ref 8.5–10.1)
CHLORIDE SERPL-SCNC: 105 MMOL/L (ref 94–109)
CO2 SERPL-SCNC: 27 MMOL/L (ref 20–32)
CREAT SERPL-MCNC: 1.1 MG/DL (ref 0.52–1.04)
GFR SERPL CREATININE-BSD FRML MDRD: 47 ML/MIN/{1.73_M2}
GLUCOSE SERPL-MCNC: 117 MG/DL (ref 70–99)
POTASSIUM SERPL-SCNC: 3.7 MMOL/L (ref 3.4–5.3)
SODIUM SERPL-SCNC: 139 MMOL/L (ref 133–144)

## 2021-01-07 ENCOUNTER — VIRTUAL VISIT (OUTPATIENT)
Dept: PALLIATIVE MEDICINE | Facility: CLINIC | Age: 81
End: 2021-01-07
Payer: MEDICARE

## 2021-01-07 DIAGNOSIS — M19.90 OSTEOARTHRITIS, UNSPECIFIED OSTEOARTHRITIS TYPE, UNSPECIFIED SITE: ICD-10-CM

## 2021-01-07 DIAGNOSIS — M25.50 ARTHRALGIA, UNSPECIFIED JOINT: ICD-10-CM

## 2021-01-07 DIAGNOSIS — M19.90 INFLAMMATORY ARTHRITIS: ICD-10-CM

## 2021-01-07 DIAGNOSIS — M25.551 HIP PAIN, RIGHT: ICD-10-CM

## 2021-01-07 PROCEDURE — 99205 OFFICE O/P NEW HI 60 MIN: CPT | Mod: 95 | Performed by: PHYSICAL MEDICINE & REHABILITATION

## 2021-01-07 ASSESSMENT — PAIN SCALES - GENERAL: PAINLEVEL: NO PAIN (0)

## 2021-01-07 NOTE — PATIENT INSTRUCTIONS
----------------------------------------------------------------  [facility] Number:  [phone]     Call with any questions about your care and for scheduling assistance.     Calls are returned Monday through Friday between 8 AM and 4:30 PM. We usually get back to you within 2 business days depending on the issue/request.    If we are prescribing your medications:    For opioid medication refills, call the clinic or send a Tok3n message 7 days in advance.  Please include:    Name of requested medication    Name of the pharmacy.    For non-opioid medications, call your pharmacy directly to request a refill. Please allow 3-4 days to be processed.     Per [place] State Law:    All controlled substance prescriptions must be filled within 30 days of being written.      For those controlled substances allowing refills, pickup must occur within 30 days of last fill.      We believe regular attendance is key to your success in our program!      Any time you are unable to keep your appointment we ask that you call us at least 24 hours in advance to cancel.This will allow us to offer the appointment time to another patient.     Multiple missed appointments may lead to dismissal from the clinic.

## 2021-01-07 NOTE — PATIENT INSTRUCTIONS
Recommendations from today's MTM visit:                                                       1. No changes today - I will check in with you next week by phone and see how you are doing.     It was great to speak with you today.  I value your experience and would be very thankful for your time with providing feedback on our clinic survey. You may receive a survey via email or text message in the next few days.     Next MTM visit: next week    To schedule another MTM appointment, please call the clinic directly or you may call the MTM scheduling line at [phone] or toll-free at [phone].     My Clinical Pharmacist's contact information:                                                      It was a pleasure talking with you today!  Please feel free to contact me with any questions or concerns you have.      Sabrina Meek, Pharm.D., M.B.A., [facility]  [place] Pharmacist, [facility]  Pager: [phone]  Email: [email or web address]

## 2021-01-07 NOTE — PROGRESS NOTES
Sister Betty is a 80 year old who is being evaluated via a billable video visit.      How would you like to obtain your AVS? Mail a copy, and mychart  If the video visit is dropped, the invitation should be resent by: Send to e-mail at: [email or web address]  Will anyone else be joining your video visit? No       [facility]  [facility] Consultation    Date of visit: 1/7/2021        Assessment:  Sister Betty Buckner is a 80 year old medically complex patient with past medical history including: Atrial fibrillation, VLH, History of DVT, CHF, HLD, HTN, DM 2, Stage 3 CKD, RLS, Depression, History of etoh abuse (remission 30+ years) who presents for evaluation and treatment of the following chronic pain conditions:    1. Right sided hip and leg pain: Patient describes a long standing history of right hip and leg pain with exacerbation in the past year. She reports pain that starts in her right low back/upper buttock and radiates laterally and posteriorly down the right leg to the foot. MRI was independently reviewed today and concerning for right S1 nerve root  Abutment/impingement. Her symptoms do correlate well with this. She has had prior lumbar epidural steroid injection but does not recall their efficacy. Reviewed images from her prior L5 transforaminal epidurals, although there is good nerve root spread there is only little epidural spread of contrast.     Her right hip MRI was also reviewed independently and shows severe OA and a joint effusion. This certainly may be contributing to her back and upper buttock pain. Discussed that I would like to see her in clinic for evaluation before recommending any procedures. She would like to avoid medications at this time due to their centrally acting effects which is reasonable.       Plan:  The following recommendations were given to the patient. Diagnosis, treatment options,  risks, benefits, and alternatives were discussed, and all questions were answered. The patient expressed understanding of the plan for management.     I am recommending a multidisciplinary treatment plan to help this patient better manage her pain.  This includes:     1. Physical Therapy: Will refer based on in-clinic evaluation.  2. Clinical Health Pain Psychologist: Coping well, baseline memory issues, likely to be of limited benefit.  3. Diagnostic Studies: none at this time. Independently reviewed lumbar MRI and hip MRI today.  4. Medication Management:   1. Continue gabapentin 300mg TID  2. Recommended stopping tramadol and norco due to fogginess.  3. Continue tylenol 1000mg TID prn.  5. Further procedures recommended: Based on in-clinic evaluation to be scheduled.  6. Follow up: for in-clinic eval. Patient will be called to schedule.      I spent 60 minutes of time face to face with the patient.  Greater than 50% of this time was spent in patient counseling and/or coordination of care regarding principles of multidisciplinary care, medication management, and treatment options as discussed above.         Charly Moore,   [facility]                           Reason for consultation:    Betty Tee is a 80 year old female who is seen in consultation today at the request of her primary care physician, Ilene Tristan.     Consultation and Evaluation for: Options for chronic hip pain    Review of [place] Prescription Monitoring Program (): Today I have also reviewed the patient's history of controlled substance use, as provided by [place] licensed pharmacies and prescriber dispensers.     Review of Pain Questionnaire: Please see the [facility] health questionnaire, which the patient completed and reviewed with me in detail.    Review of Electronic Chart: Today I have also reviewed available medical information in the patient's medical record at [facility]  (EPIC), including relevant provider notes, laboratory work, and imaging.     Betty Tee has not been seen at a pain clinic in the past.      Chief Complaint:    Chief Complaint   Patient presents with     Pain     Video visit due to Covid-19       Pain history:  Betty Tee is a 80 year old female who presents for initial evaluation of chief pain complaint of chronic right hip and leg pain.     She started having right sided hip and leg pain several years ago now, this has worsened significantly in the past year. The pain starts in her low back/right buttock area and radiates down the lateral part of her right leg down to the foot laterally. She characterizes the pain radiating down her leg as burning. She rates the pain as 6/10 on average. Movement, activity tend to make her back and leg pain worse.     She uses a walker for ambulation to prevent falls. She does feel that her right leg is weaker compared to her left. She denies any bowel/bladder changes. She has urinary urgency and intermittent diarrhea chronically.    She has been seen by orthopedic surgery with recommendations for a right hip SRI. She has not been seen by any spine surgeons recently. She had a back surgery in her lower lumbar spine in the 1990s.     Dr. Lopez (her rheumatologist) recently ordered Tramadol and norco, she tried these but they caused her to be foggy so she stopped these medications.    The patient denies bowel or bladder incontinence, , weakness, saddle anesthesia, unintentional weight loss, or fever/chills/sweats.         Pain Treatments:  1. Medications:       Current pain medications:  -Gabapentin 300mg TID  -Tylenol 1000mg q8h prn  -Escitalopram 20mg daily       Previous pain medications:  -Tramadol 50mg prn  -Norco 5-325mg prn  2. Physical Therapy: hasn't completed PT for low back or hip pain     TENS unit: hasn't tried  3. Pain psychology: hasn't tried  4. Surgery: Lumbar spine surgery in the 1960s.  5.  Injections: -Two epidural injections (right L5 tfesi), feb 2020 was helpful the next day but she had a fall right after, aug 2020 procedure - not helpful, had worsening pain  6. Alternative Therapies:    Chiropractic: hasn't tried    Acupuncture: hasn't tried      Diagnostic tests:  MRI of L-spine was completed on 2/8/2020 was reviewed with the patient and shows:    MR LUMBAR SPINE W/O CONTRAST 2/8/2020 3:09 PM     History: Radiculopathy, < 6wks, no red flags, no prior management;  Lumbar back pain with radiculopathy affecting right lower extremity.     ICD-10: Lumbar back pain with radiculopathy affecting right lower  extremity     Comparison: Lumbar spine CT 7/25/2018 and chest CT 7/25/2018     Technique: Sagittal STIR, T1-weighted turbo spin-echo, 3-D volumetric  T2-weighted and axial reconstructed T2-weighted images of the lumbar  spine were obtained without intravenous contrast.      Findings: 5 lumbar-type vertebra counting from C2. Stable mild  compression deformity at T7 and T4 vertebral body based on limited   imaging, similar to chest CT from 7/25/2018.  Trace anterolisthesis at L5-S1 without any obvious pars defect. Disc  height narrowing at L5-S1. Remainder of the disc spaces are relatively  normal. Conus medullaris is at L1. No suspicious bone marrow edema.  Relatively normal appearance of the lower thoracic spinal cord. Normal  cauda equina fibers.     On a level by level basis:     T10-L1:No significant spinal canal or foraminal narrowing.     L1-2: Disc bulge without significant spinal canal or foraminal  narrowing. Mild bilateral ligament flavum hypertrophy.     L2-3: Disc bulge without significant spinal canal or foraminal  narrowing. Mild-moderate bilateral ligament flavum hypertrophy and  facet hypertrophy.     L3-4: Disc bulge and moderate bilateral degenerative facet  hypertrophy, without significant spinal canal stenosis. There is mild  left foraminal narrowing.     L4-5: Disc bulge and  mild degenerative facet arthropathy without  significant spinal canal stenosis. There is mild left foraminal  narrowing.     L5-S1: Subtle anterolisthesis and calcified disc bulge more asymmetric  to the right posterior lateral recess. Mild effacement of the right  lateral recess and abutment of the right descending S1 root in that  location. Mild narrowing of the right neural foramen. Patent left  neural foramen.     Paraspinous and retroperitoneal structures are grossly normal within  limits of this study.                                                                      Impression:  1. Degenerative spondylosis as described above. Most notably, there is  grade 1 anterolisthesis/asymmetric calcified disc bulge at L5-S1 with  mild effacement of the right lateral recess and abutment of the right  descending S1 nerve root.  2. There is also mild foraminal narrowing at multiple levels described  above.  3. Suspected chronic compression deformity at T7 and T4, based on  limited  images.     I have personally reviewed the examination and initial interpretation  and I agree with the findings.     KOFFI SINGH MD    Right hip MR from 9/5/2020 shows:                                                                   IMPRESSION:  1. Marked osteoarthrosis in the right hip joint with joint space  narrowing, high-grade cartilage loss, subchondral bone marrow edema  and cystic changes, osteophytic spurring, and degenerative labral  tearing.  2. Small right joint effusion with synovitis.  3. No marrow signal abnormalities to suggest fracture or marrow  infiltration.  4. Insertional tendinopathy of the gluteus minimus and gluteus medius  tendons, with partial-thickness tearing of the gluteus minimus tendon.     FAITH ESTRELLA MD      Labs:   Lab Results   Component Value Date    WBC 10.3 12/04/2020     Lab Results   Component Value Date    RBC 4.38 12/04/2020     Lab Results   Component Value Date    HGB 13.2  12/04/2020     Lab Results   Component Value Date    HCT 41.2 12/04/2020     Lab Results   Component Value Date    MCV 94 12/04/2020     Lab Results   Component Value Date    MCH 30.1 12/04/2020     Lab Results   Component Value Date    MCHC 32.0 12/04/2020     Lab Results   Component Value Date    RDW 15.0 12/04/2020     Lab Results   Component Value Date     12/04/2020     Last Comprehensive Metabolic Panel:  Sodium   Date Value Ref Range Status   01/05/2021 139 133 - 144 mmol/L Final     Potassium   Date Value Ref Range Status   01/05/2021 3.7 3.4 - 5.3 mmol/L Final     Chloride   Date Value Ref Range Status   01/05/2021 105 94 - 109 mmol/L Final     Carbon Dioxide   Date Value Ref Range Status   01/05/2021 27 20 - 32 mmol/L Final     Anion Gap   Date Value Ref Range Status   01/05/2021 7 3 - 14 mmol/L Final     Glucose   Date Value Ref Range Status   01/05/2021 117 (H) 70 - 99 mg/dL Final     Urea Nitrogen   Date Value Ref Range Status   01/05/2021 16 7 - 30 mg/dL Final     Creatinine   Date Value Ref Range Status   01/05/2021 1.10 (H) 0.52 - 1.04 mg/dL Final     GFR Estimate   Date Value Ref Range Status   01/05/2021 47 (L) >60 mL/min/[1.73_m2] Final     Comment:     Non  GFR Calc  Starting 12/18/2018, serum creatinine based estimated GFR (eGFR) will be   calculated using the Chronic Kidney Disease Epidemiology Collaboration   (CKD-EPI) equation.       Calcium   Date Value Ref Range Status   01/05/2021 8.9 8.5 - 10.1 mg/dL Final     Bilirubin Total   Date Value Ref Range Status   12/04/2020 0.6 0.2 - 1.3 mg/dL Final     Alkaline Phosphatase   Date Value Ref Range Status   12/04/2020 95 40 - 150 U/L Final     ALT   Date Value Ref Range Status   12/04/2020 20 0 - 50 U/L Final     AST   Date Value Ref Range Status   12/04/2020 18 0 - 45 U/L Final         Past Medical History:  Past Medical History:   Diagnosis Date     Alcohol abuse, in remission      Allergic rhinitis, cause unspecified      allegra helps when she takes it     Antiplatelet or antithrombotic long-term use      Atrial fibrillation (H)     in hosp in 11/11 after surgery w/ fluid overload     Cardiomegaly     LVH on stress echo- cardiac w/u at [facility] ER- neg CT scan for PE, neg stress echo in 8/06     Chest pain, unspecified      Disorder of bone and cartilage, unspecified     osteopenia (had been on prempro), improved on 6/06 dexa, stable dexa 11/10     Diverticulosis of colon (without mention of hemorrhage)     last episode yrs ago     Essential hypertension, benign      Follicular bronchiolitis (H)     associated with Sjogrens, dx by chest CT showing mosaic attenuation and air trapping     Gastro-oesophageal reflux disease      ILD (interstitial lung disease) (H)     associated with Sjogrens, also has mildly elevated IgG4, first noted on chest CT 2015 (mild changes) and also has small airways disease; ILD improved on follow up chest CT 2018.     Insomnia, unspecified     weaned off clonazepam     Irregular heart beat      Lumbago 7/09    MRI with DJD, now seeing Dr. Cain for sciatic sx's     Major depressive disorder, recurrent episode, moderate (H)      Obstructive sleep apnea     uses cpap     Osteoarthrosis, unspecified whether generalized or localized, unspecified site      Sjogren's syndrome (H)     + RG and SSA and lip bx     Sleep apnea      Tobacco use disorder     chantix in 9/07, started again in 6/08, working       Past Surgical History:  Past Surgical History:   Procedure Laterality Date     BACK SURGERY  1962     BIOPSY BREAST  9/27/02    Biopsy Left Breast     C APPENDECTOMY  1970's?     CARDIAC SURGERY       CHOLECYSTECTOMY  1990's?     COLECTOMY LEFT  11/7/2011    Procedure:COLECTOMY LEFT; Laparoscopic mobilization of splenic flexture, sigmoid colectomy, coloprotoscopy, loop illeostomy; Surgeon:CK CASTANEDA; Location:[facility] OR     HYSTERECTOMY TOTAL ABDOMINAL, BILATERAL SALPINGO-OOPHORECTOMY, COMBINED  11/7/2011     Procedure:COMBINED HYSTERECTOMY TOTAL ABDOMINAL, BILATERAL SALPINGO-OOPHORECTOMY; total abdominal hysterectomy, bilateral salpingo-oophorectomy; Surgeon:ALETA MANUEL; Location:[facility] OR     INSERT STENT URETER  11/7/2011    Procedure:INSERT STENT URETER; Placement of Bilateral Ureteral Stents ; Surgeon:PRANEETH BRYANT; Location:[facility] OR     SIGMOIDOSCOPY FLEXIBLE  11/3/2011    Procedure:SIGMOIDOSCOPY FLEXIBLE; Flexible Sigmoidoscopy; Surgeon:CK CATSANEDA; Location:[facility] OR     TAKEDOWN ILEOSTOMY  2/1/2012    Procedure:TAKEDOWN ILEOSTOMY; Takedown Loop Ileostomy ; Surgeon:CK CASTANEDA; Location:[facility] OR     ZZC NONSPECIFIC PROCEDURE  11/05    exploratory abd lap, adhesions, resolved RLQ pain, diverticulitis episodes       Medications:  Current Outpatient Medications   Medication Sig Dispense Refill     acetaminophen (TYLENOL) 500 MG tablet Take 1,000 mg by mouth every 8 hours as needed (max 6 tablets/24 hours, 2 tablets/dose)        acyclovir (ZOVIRAX) 400 MG tablet TAKE 1 TABLET (400 MG) BY MOUTH 3 TIMES DAILY FOR A COUPLE DAYS 15 tablet 2     acyclovir (ZOVIRAX) 5 % external ointment Apply topically 6 times daily As needed for outbreaks 15 g 3     albuterol (PROAIR HFA, PROVENTIL HFA, VENTOLIN HFA) 108 (90 BASE) MCG/ACT inhaler Inhale 2 puffs into the lungs every 6 hours 1 Inhaler 3     alendronate (FOSAMAX) 70 MG tablet Take 1 tablet (70 mg) by mouth every 7 days 12 tablet 1     atorvastatin (LIPITOR) 10 MG tablet TAKE 1/2 TABLET BY MOUTH EVERY DAY 45 tablet 3     azithromycin (ZITHROMAX) 250 MG tablet Take 1 tablet (250 mg) by mouth daily 30 tablet 11     CEVIMELINE 30 MG PO capsule TAKE 1 CAPSULE (30 MG) BY MOUTH 3 TIMES DAILY (Patient taking differently: Take 30 mg by mouth every other day ) 270 capsule 2     escitalopram (LEXAPRO) 20 MG tablet TAKE 1 TABLET BY MOUTH EVERY DAY 90 tablet 0     fluticasone (FLONASE) 50 MCG/ACT nasal spray Spray 1-2 sprays into both nostrils daily as needed for allergies 18.2 mL  11     fluticasone-vilanterol (BREO ELLIPTA) 100-25 MCG/INH inhaler Inhale 1 puff into the lungs daily 1 Inhaler 11     gabapentin (NEURONTIN) 300 MG capsule Take 1 capsule (300 mg) by mouth 3 times daily 270 capsule 0     HYDROcodone-acetaminophen (NORCO) 7.5-325 MG per tablet Take 1 tablet by mouth every 12 hours 60 tablet 0     hydroxychloroquine (PLAQUENIL) 200 MG tablet Take 2 tablets (400 mg) by mouth daily Annual Plaquenil toxicity eye screening required. 180 tablet 0     insulin aspart (NOVOLOG FLEXPEN) 100 UNIT/ML pen Inject 9 units three times daily under the skin with meals along with sliding scale correction (add one extra unit for every 50 mg/dL over 150) 15 mL 0     insulin glargine (BASAGLAR KWIKPEN) 100 UNIT/ML pen Inject 30 Units Subcutaneous daily 15 mL 1     lidocaine (LIDODERM) 5 % patch Apply patch to painful area for up to 12 h within a 24 h period.  Remove after 12 hours. 30 patch 5     loratadine (CLARITIN) 10 MG tablet TAKE 1 TABLET BY MOUTH EVERY DAY 90 tablet 1     metoprolol succinate ER (TOPROL-XL) 25 MG 24 hr tablet Take 0.5 tablets (12.5 mg) by mouth 2 times daily Take 50 mg by mouth in combination with 12.5 for a total of 62.5 twice a day 90 tablet 3     metoprolol succinate ER (TOPROL-XL) 50 MG 24 hr tablet Take 50 mg by mouth in combination with 12.5 for a total of 62.5 twice a day 180 tablet 3     montelukast (SINGULAIR) 10 MG tablet TAKE 1 TABLET BY MOUTH EVERYDAY AT BEDTIME 90 tablet 0     potassium chloride ER (KLOR-CON) 20 MEQ CR tablet Take 2 tablets (40 mEq) by mouth every morning AND 1 tablet (20 mEq) every evening. 270 tablet 3     predniSONE (DELTASONE) 1 MG tablet Take 1 tablet by mouth daily Take with 5mg tablet for TDD of 6mg       predniSONE (DELTASONE) 5 MG tablet Take 1 tablet by mouth daily Take with 1mg for total daily dose of 6mg       rivaroxaban ANTICOAGULANT (XARELTO ANTICOAGULANT) 20 MG TABS tablet Take 1 tablet (20 mg) by mouth daily (with dinner) 90 tablet 2      semaglutide (OZEMPIC, 1 MG/DOSE,) 2 MG/1.5ML pen Inject 1 mg Subcutaneous every 7 days 3 mL 1     spironolactone (ALDACTONE) 25 MG tablet Take 2 tablets (50 mg) by mouth daily 180 tablet 3     torsemide (DEMADEX) 10 MG tablet TAKE ONE TAB (10 MG) WITH ONE 20 MG TAB TO = 30 MG DAILY IN AFTERNOON. 90 tablet 3     torsemide (DEMADEX) 20 MG tablet TAKE 2 TABS (40 MG) IN AM DAILY AND TAKE 1 TAB BY MOUTH IN THE AFTERNOON ALONG W/ A 10 MG  tablet 4     traMADol (ULTRAM) 50 MG tablet Take 1 tablet (50 mg) by mouth every 8 hours as needed for severe pain 60 tablet 3     traZODone (DESYREL) 50 MG tablet TAKE 0.5-1.5 TABLETS (25-75 MG) BY MOUTH NIGHTLY AS NEEDED FOR SLEEP 135 tablet 0     vitamin D3 (CHOLECALCIFEROL) 50 mcg (2000 units) tablet Take 1 tablet (50 mcg) by mouth daily 90 tablet 2       Allergies:     Allergies   Allergen Reactions     Amoxicillin-Pot Clavulanate      Augmentin Nausea and Vomiting     Codeine Nausea and Vomiting     Codeine      PN: LW Reaction: HIVES     Penicillins Nausea and Vomiting     PN: LW Reaction: GI Upset     Phenobarbital Itching     Phenobarbital      Seasonal Allergies        Social History:  Home situation: Lives in [place]  Occupation/Schooling: Teacher/Prof of education  Tobacco use: denies  Drug use: denies  Alcohol use: denies, quit 34 years ago  History of chemical dependency treatment: denies  Mental health admissions: denies    Family history:  Family History   Problem Relation Age of Onset     C.A.D. Mother 63        MI- first at age 63     Heart Disease Mother      Hypertension Mother      Cerebrovascular Disease Mother      Hyperlipidemia Mother      Alcohol/Drug Father      Alzheimer Disease Father      Dementia Father      Hypertension Father      Hyperlipidemia Father      Diabetes Sister      C.A.D. Sister 52        Minor MI- age 50's     Heart Disease Sister      Hypertension Sister      Hypertension Sister      Hypertension Brother      Cancer -  colorectal Sister 48        Late 40's early 50's     Prostate Cancer Brother 74        Dx'd age 74     Gastrointestinal Disease Sister         Diverticulitis     Gastrointestinal Disease Brother         Diverticulitis     Lipids Sister      Lipids Sister      Parkinsonism Brother      Diabetes Sister      Heart Disease Sister         CHF     Cancer Sister         lung, smoker     Substance Abuse Sister      Substance Abuse Brother      Asthma Sister      Cancer Sister      Breast Cancer Daughter      Prostate Cancer Brother      Hyperlipidemia Brother      Diabetes Other      Hypertension Other        Review of Systems:    POSTIVE IN BOLD  GENERAL: fever/chills, fatigue, general unwell feeling, weight gain/loss.  HEAD/EYES:  headache, dizziness, or vision changes.    EARS/NOSE/THROAT:  Nosebleeds, hearing loss, sinus infection, earache, tinnitus.  IMMUNE:  Allergies, cancer, immune deficiency, or infections.  SKIN:  Urticaria, rash, hives  HEME/Lymphatic:   anemia, easy bruising, easy bleeding.  RESPIRATORY:  cough, wheezing, or shortness of breath  CARDIOVASCULAR/Circulation:  Extremity edema, syncope, hypertension, tachycardia, or angina.  GASTROINTESTINAL:  abdominal pain, nausea/emesis, diarrhea, constipation,  hematochezia, or melena.  ENDOCRINE:  Diabetes, steroid use,  thyroid disease or osteoporosis.  MUSCULOSKELETAL: neck pain, back pain, arthralgia, arthritis, or gout.  GENITOURINARY:  frequency, urgency, dysuria, difficulty voiding, hematuria or incontinence.  NEUROLOGIC:  weakness, numbness, paresthesias, seizure, tremor, stroke or memory loss.  PSYCHIATRIC:  depression, anxiety, stress, suicidal thoughts or mood swings.         Video Start Time: 234pm  Video-Visit Details    Type of service:  Video Visit    Video End Time:3:07 PM    Originating Location (pt. Location): Home    Distant Location (provider location):  [facility] [place]     Platform used for Video Visit:  [facility]    BILLING TIME DOCUMENTATION:   The total TIME spent on this patient on the date of the encounter/appointment was 63 minutes.      TOTAL TIME includes:   Time spent preparing to see the patient (reviewing records and tests)  Time spent face to face (or over the phone) with the patient  Time spent ordering tests, medications, procedures and referrals   Time spent Referring and communicating with other healthcare professionals  Time spent documenting clinical information in Epic

## 2021-01-07 NOTE — Clinical Note
Thanks for the referral Dr. Tristan!    I'll be seeing Sister Betty in clinic for further evaluation before recommending a procedure but her symptoms certainly sound like they're radicular and not related to the hip OA. Will update you as we progress.    Charly Moore, DO  [facility]

## 2021-01-10 NOTE — PROGRESS NOTES
Rheumatology Clinic Virtual Visit Note  Zulma Lopez MD  DOS: 2020  Date of last visit: 2020    Name: Betty Tee  MRN: [id number]  Age: 80 year old  : 1940  Reason for visit: Follow up visit for R hip pain sec severe OA, PMR, presumed gout flare of L foot, primary Sjogren's syndrome    # Sjogren's syndrome since  with borderline +RG, +SSA, and lip biopsy focus score of 1.  # Follicular bronchiolitis, prior mild ILD   # Osteopenia on DEXA 2019 with T score=-2.1 with decline in bone density  # Chronic prednisone use  # DM  # A fib     Assessment and Plan:    New Dx of PMR. Severe R hip pain is due to severe OA based on 2020 hip MRI. She prefers to avoid hip arthroplastyHer inflammation markers improved on prednisone, PMR responded to prednisone. Recent presumed gout flare (not crystal proven) responded to Tx.    Primary Sjogren's syndrome with ILD   Sister Sita returns with stable PFTs and improvement on most recent chest CT showing bronchiolitis, but no ILD. Completed pulmonary rehab in 2019 where she was able to exercise without oxygen.     On HCQ since 2019 with significant improvement of joint pain.    Tolerates HCQ well.       Plan:    Tramadol 1 tab every 8 hours as needed for pain, please stop if it makes you sleepy, drowsy or causes severe constipation    Prednisone taper: 9-8-7-6 mg a day each for one week then 5 mg a day    Labs on     Follow up with Dr. See    Stay on fosamax weekly    Continue HCQ    Yearly eye exam on HCQ    Return in 2 months        Orders Placed This Encounter   Procedures     CRP inflammation     Erythrocyte sedimentation rate auto     Basic metabolic panel     Routine UA with Micro Reflex to Culture     Uric acid       Video Start Time: 2:36 pm  Video End Time: 2:55 pm    Zulma Lopez MD         HPI:   Betty Tee is a 80 year old WF with a history of primary Sjogren's syndrome, PMR, OA who presents for follow-up. She was  diagnosed with Sjogren's syndrome in 2015 with borderline +RG, +SSA, and lip biopsy focus score of 1. Associated ILD and joint pain are prednisone-responsive which support the diagnosis.     Today 12/11/2020: Sister Betty is on prednisone 10 mg a day, she was given prednisone taper recently for possible foot gout flare which helped, but foor feels tight and becomes swollen towards the end of the day. Her major complaint is severe R hip pain. Saw ortho, had R hip MRI on 9/5/2020 which showed severe OA, R hip arthroplasty was recommended. She would prefer to delay R hip arthroplasty. She takes tylenol which does not help. NSAIDs are not allowed with CKD. Norco affects her mood. She would like something stronger than tylenol for R hip pain but not norco.        Review of Systems:   A comprehensive ROS was done. Positives are per HPI.      Active Medications:     Outpatient Medications Prior to Visit   Medication Sig Dispense Refill     acetaminophen (TYLENOL) 500 MG tablet Take 1,000 mg by mouth every 8 hours as needed (max 6 tablets/24 hours, 2 tablets/dose)        acyclovir (ZOVIRAX) 5 % external ointment Apply topically 6 times daily As needed for outbreaks 15 g 3     albuterol (PROAIR HFA, PROVENTIL HFA, VENTOLIN HFA) 108 (90 BASE) MCG/ACT inhaler Inhale 2 puffs into the lungs every 6 hours 1 Inhaler 3     alendronate (FOSAMAX) 70 MG tablet Take 1 tablet (70 mg) by mouth every 7 days 12 tablet 1     atorvastatin (LIPITOR) 10 MG tablet TAKE 1/2 TABLET BY MOUTH EVERY DAY 45 tablet 3     azithromycin (ZITHROMAX) 250 MG tablet Take 1 tablet (250 mg) by mouth daily 30 tablet 11     CEVIMELINE 30 MG PO capsule TAKE 1 CAPSULE (30 MG) BY MOUTH 3 TIMES DAILY (Patient taking differently: Take 30 mg by mouth every other day ) 270 capsule 2     fluticasone (FLONASE) 50 MCG/ACT nasal spray Spray 1-2 sprays into both nostrils daily as needed for allergies 18.2 mL 11     fluticasone-vilanterol (BREO ELLIPTA) 100-25 MCG/INH  inhaler Inhale 1 puff into the lungs daily 1 Inhaler 11     gabapentin (NEURONTIN) 300 MG capsule Take 1 capsule (300 mg) by mouth 3 times daily 270 capsule 0     HYDROcodone-acetaminophen (NORCO) 7.5-325 MG per tablet Take 1 tablet by mouth every 12 hours 60 tablet 0     hydroxychloroquine (PLAQUENIL) 200 MG tablet Take 2 tablets (400 mg) by mouth daily Annual Plaquenil toxicity eye screening required. 180 tablet 0     insulin aspart (NOVOLOG FLEXPEN) 100 UNIT/ML pen Inject 9 units three times daily under the skin with meals along with sliding scale correction (add one extra unit for every 50 mg/dL over 150) 15 mL 0     insulin glargine (BASAGLAR KWIKPEN) 100 UNIT/ML pen Inject 30 Units Subcutaneous daily 15 mL 1     lidocaine (LIDODERM) 5 % patch Apply patch to painful area for up to 12 h within a 24 h period.  Remove after 12 hours. 30 patch 5     loratadine (CLARITIN) 10 MG tablet TAKE 1 TABLET BY MOUTH EVERY DAY 90 tablet 1     metoprolol succinate ER (TOPROL-XL) 25 MG 24 hr tablet Take 0.5 tablets (12.5 mg) by mouth 2 times daily Take 50 mg by mouth in combination with 12.5 for a total of 62.5 twice a day 90 tablet 3     metoprolol succinate ER (TOPROL-XL) 50 MG 24 hr tablet Take 50 mg by mouth in combination with 12.5 for a total of 62.5 twice a day 180 tablet 3     potassium chloride ER (KLOR-CON) 20 MEQ CR tablet Take 2 tablets (40 mEq) by mouth every morning AND 1 tablet (20 mEq) every evening. 270 tablet 3     rivaroxaban ANTICOAGULANT (XARELTO ANTICOAGULANT) 20 MG TABS tablet Take 1 tablet (20 mg) by mouth daily (with dinner) 90 tablet 2     semaglutide (OZEMPIC, 1 MG/DOSE,) 2 MG/1.5ML pen Inject 1 mg Subcutaneous every 7 days 3 mL 1     spironolactone (ALDACTONE) 25 MG tablet Take 2 tablets (50 mg) by mouth daily 180 tablet 3     torsemide (DEMADEX) 10 MG tablet TAKE ONE TAB (10 MG) WITH ONE 20 MG TAB TO = 30 MG DAILY IN AFTERNOON. 90 tablet 3     torsemide (DEMADEX) 20 MG tablet TAKE 2 TABS (40 MG) IN  AM DAILY AND TAKE 1 TAB BY MOUTH IN THE AFTERNOON ALONG W/ A 10 MG  tablet 4     traZODone (DESYREL) 50 MG tablet TAKE 0.5-1.5 TABLETS (25-75 MG) BY MOUTH NIGHTLY AS NEEDED FOR SLEEP 135 tablet 0     vitamin D3 (CHOLECALCIFEROL) 50 mcg (2000 units) tablet Take 1 tablet (50 mcg) by mouth daily 90 tablet 2     escitalopram (LEXAPRO) 20 MG tablet TAKE 1 TABLET BY MOUTH EVERY DAY 90 tablet 0     montelukast (SINGULAIR) 10 MG tablet TAKE 1 TABLET BY MOUTH EVERYDAY AT BEDTIME 90 tablet 0     No facility-administered medications prior to visit.      Allergies:   Augmentin\  Codeine  Phenobarbital  Seasonal allergies      Past Medical History:  Alcohol abuse, in remission.   Allergic rhinitis.   Antiplatelet long-term use.   Atrial fibrillation.   Cardiomegaly.   Osteopenia.   Diverticulosis.   Benign essential hypertension.   GERD.   Insomnia.   Irregular heart beat.   Lumbago.   Major depressive disorder, recurrent episode, moderate.   ANGELICA.  Osteoarthrosis.   Sjogren's syndrome.   Sleep apnea.   Tobacco use disorder.   TMJ disorder.   RLS.  Major depressive disorder.   Hypertension.   Sciatica.   Colouterine fistula.   Left ventricular hypertrophy.   Breat fibroadenoma.   DVT.   Fatty liver disease, nonalcoholic.   Rib pain.   Neck mass.   Interstitial lung disease.   Acute and chronic respiratory failure with hypoxia.   Type II diabetes mellitus.   Hyperlipidemia.   Inflammatory arthritis.      Past Surgical History:  Back surgery.   Left breast biopsy.   Appendectomy.   Exploratory laparotomy.   Cardiac surgery.   Cholecystectomy.   Left colectomy.   Total abdominal hysterectomy with bilateral salpingo-oophorectomy.   Insert ureter stent.   Flexible sigmoidoscopy.   Ileostomy takedown.     Family History:   Mother: Positive for CAD, heart disease, hypertension, cerebrovascular disease, hyperlipidemia.   Father: Positive for alcohol/drug abuse, Alzheimer disease, dementia, hypertension, hyperlipidemia.   Sister:  Positive for CAD, hypertension, and colorectal cancer.   Sister: Positive for hypertension and hyperlipidemia.   Sister: Positive for diabetes, lung cancer, asthma, and heart disease.   Brother: Positive for Parkinsonism and substance abuse.   Brother: Positive for hypertension, diverticulitis, and prostate cancer.   Daughter: Positive for breast cancer.        Social History:   She is a former smoker; quit in 2011. She has a history of alcohol abuse but stopped drinking in 1986.      Physical Exam:   Constitutional: NAD, pleasant  Eyes: Normal EOM, conjunctiva, sclera  MS: mild swelling of L foot, painful ROM of R hip  Skin: No alopecia, rash  Neuro: grossly non-focal  Psych: Normal affect.    Images:  CT Chest w/o contrast (7/25/18):  Findings remain most consistent with small airway disease  and/or nonclassifiable interstitial lung disease and are not  significantly changed from the March 2017 comparison.    Per radiology.    Laboratory:   RHEUM RESULTS Latest Ref Rng & Units 10/20/2020 12/4/2020 1/5/2021   COMPLEMENT C3 81 - 157 mg/dL - - -   COMPLEMENT C4 13 - 39 mg/dL - - -   SED RATE 0 - 30 mm/h - 15 -   CRP, INFLAMMATION 0.0 - 8.0 mg/L - 15.0(H) -   CK TOTAL 30 - 225 U/L - - -   RHEUMATOID FACTOR <12 IU/mL - - -   RG SCREEN BY EIA <1.0 - - -   AST 0 - 45 U/L - 18 -   ALT 0 - 50 U/L - 20 -   ALBUMIN 3.4 - 5.0 g/dL - 3.1(L) -   WBC 4.0 - 11.0 10e9/L - 10.3 -   RBC 3.8 - 5.2 10e12/L - 4.38 -   HGB 11.7 - 15.7 g/dL - 13.2 -   HCT 35.0 - 47.0 % - 41.2 -   MCV 78 - 100 fl - 94 -   MCHC 31.5 - 36.5 g/dL - 32.0 -   RDW 10.0 - 15.0 % - 15.0 -    - 450 10e9/L - 189 -   CREATININE 0.52 - 1.04 mg/dL 1.16(H) 1.49(H) 1.10(H)   GFR ESTIMATE, IF BLACK >60 mL/min/[1.73:m2] 51(L) 38(L) 55(L)   GFR ESTIMATE >60 mL/min/[1.73:m2] 44(L) 33(L) 47(L)    - 1,616 mg/dL - - -   IGA 84 - 499 mg/dL - - -   IGM 35 - 242 mg/dL - - -       Rheumatoid Factor   Date Value Ref Range Status   07/24/2015 <20 <20 IU/mL Final   ,   ,   Cyclic Cit Pept IgG/IgA   Date Value Ref Range Status   07/24/2015 <20  Interpretation:  Negative   <20 UNITS Final   ,  ,   Scleroderma Antibody Scl-70 CECILIA IgG   Date Value Ref Range Status   07/24/2015  0.0 - 0.9 AI Final    <0.2  Negative   Antibody index (AI) values reflect qualitative changes in antibody   concentration that cannot be directly associated with clinical condition or   disease state.       SSA (Ro) (CECILIA) Antibody, IgG   Date Value Ref Range Status   07/24/2015 1.6 (H) 0.0 - 0.9 AI Final     Comment:     Positive   Antibody index (AI) values reflect qualitative changes in antibody   concentration that cannot be directly associated with clinical condition or   disease state.       SSB (La) (CECILIA) Antibody, IgG   Date Value Ref Range Status   07/24/2015  0.0 - 0.9 AI Final    <0.2  Negative   Antibody index (AI) values reflect qualitative changes in antibody   concentration that cannot be directly associated with clinical condition or   disease state.       ,  ,   RG Screen by EIA   Date Value Ref Range Status   07/24/2015 <1.0  Interpretation:  Negative   <1.0 Final   ,  ,  ,  ,  ,  ,  ,  ,  ,  ,  ,  ,  ,  ,  ,  ,  ,  ,   Neutrophil Cytoplasmic IgG Antibody   Date Value Ref Range Status   07/24/2015   Final    <1:20  Reference range: <1:20  (Note)  The ANCA IFA is <1:20; therefore, no further testing will  be performed.  INTERPRETIVE INFORMATION: Anti-Neutrophil Cyto Ab, IgG  Neutrophil Cytoplasmic Antibodies (C-ANCA = granular  cytoplasmic staining, P-ANCA = perinuclear staining) are  found in the serum of over 90 percent of patients with  certain necrotizing systemic vasculitides, and usually in  less than 5 percent of patients with collagen vascular  disease or arthritis.  Performed by [facility],  [address] [place] [id number] [phone]  [email or web address], Burke Mckeon MD, Lab. Director       ,  ,  ,  ,  ,  ,  ,   IGG   Date Value Ref Range Status   07/24/2015 0977 599 - 3677  mg/dL Final   ,  ,  ,  ,  ,   Scleroderma Antibody Scl-70 CECILIA IgG   Date Value Ref Range Status   07/24/2015  0.0 - 0.9 AI Final    <0.2  Negative   Antibody index (AI) values reflect qualitative changes in antibody   concentration that cannot be directly associated with clinical condition or   disease state.       Component      Latest Ref Rng & Units 12/4/2020   WBC      4.0 - 11.0 10e9/L 10.3   RBC Count      3.8 - 5.2 10e12/L 4.38   Hemoglobin      11.7 - 15.7 g/dL 13.2   Hematocrit      35.0 - 47.0 % 41.2   MCV      78 - 100 fl 94   MCH      26.5 - 33.0 pg 30.1   MCHC      31.5 - 36.5 g/dL 32.0   RDW      10.0 - 15.0 % 15.0   Platelet Count      150 - 450 10e9/L 189   % Neutrophils      % 72.8   % Lymphocytes      % 14.4   % Monocytes      % 10.6   % Eosinophils      % 1.7   % Basophils      % 0.5   Absolute Neutrophil      1.6 - 8.3 10e9/L 7.5   Absolute Lymphocytes      0.8 - 5.3 10e9/L 1.5   Absolute Monocytes      0.0 - 1.3 10e9/L 1.1   Absolute Eosinophils      0.0 - 0.7 10e9/L 0.2   Absolute Basophils      0.0 - 0.2 10e9/L 0.1   Diff Method       Automated Method   Sodium      133 - 144 mmol/L 138   Potassium      3.4 - 5.3 mmol/L 3.6   Chloride      94 - 109 mmol/L 105   Carbon Dioxide      20 - 32 mmol/L 27   Anion Gap      3 - 14 mmol/L 6   Glucose      70 - 99 mg/dL 176 (H)   Urea Nitrogen      7 - 30 mg/dL 16   Creatinine      0.52 - 1.04 mg/dL 1.49 (H)   GFR Estimate      >60 mL/min/1.73:m2 33 (L)   GFR Estimate If Black      >60 mL/min/1.73:m2 38 (L)   Calcium      8.5 - 10.1 mg/dL 9.4   Bilirubin Total      0.2 - 1.3 mg/dL 0.6   Albumin      3.4 - 5.0 g/dL 3.1 (L)   Protein Total      6.8 - 8.8 g/dL 7.3   Alkaline Phosphatase      40 - 150 U/L 95   ALT      0 - 50 U/L 20   AST      0 - 45 U/L 18   Sed Rate      0 - 30 mm/h 15   CRP Inflammation      0.0 - 8.0 mg/L 15.0 (H)

## 2021-01-11 DIAGNOSIS — M35.02 SJOGREN'S SYNDROME WITH LUNG INVOLVEMENT (H): ICD-10-CM

## 2021-01-11 NOTE — LETTER
Betty Tee  [address]  [facility] [address]    Dear Betty Tee,     Regular eye exams are required while taking hydroxychloroquine (Plaquenil). Eye exams should be completed by an eye specialist who is experienced in monitoring for hydroxychloroquine toxicity (a rare effect of the drug that can damage your eyes and vision).  These may be yearly or as determined by your eye specialist.     Although vision problems and loss of sight while taking hydroxychloroquine are very rare, notify your doctor immediately if you notice changes in your vision. The goal of screening is to detect toxicity before your vision is significantly or noticeably impacted. Failing to get proper screening exams puts you at risk of vision changes which may or may not be reversible.    Per the American Academy of Ophthalmology recommendations (2016), screening tests performed may include a 10-2 visual field test, spectral-domain optical coherence tomography (SD OCT), or other screening tests as determined by the eye specialist, including a multifocal electroretinogram (mfERG) or fundus auto-fluorescence (FAF).    We received a refill request from your pharmacy. A copy of your current eye exam was not found in your medical record. Your hydroxychloroquine prescription has been refilled with a limited supply pending confirmation you have had an eye exam to test for hydroxychloroquine toxicity.      We encourage you to bring this letter to your eye exam to discuss the exams that will be performed during your visit. Please request your eye clinic fax or mail a copy of your eye exam report to our clinic indicating that testing was completed for hydroxychloroquine toxicity screening. The exam notes must specifically comment on the potential for hydroxychloroquine toxicity and outline recommended follow-up.    If you have questions about hydroxychloroquine or the information in this letter, please call the clinic at [phone]  or talk to your provider at your next office visit.                        2    Eye Specialist Letter  Dear Eye Specialist,  To ensure safe use of Plaquenil (hydroxychloroquine), we are requesting your assistance in providing the following information. Please return this form to our clinic via mail or fax, or incorporate this information into your visit summary. Your note must indicate whether or not there is evidence of toxicity from Plaquenil use. For questions regarding this form, please call [phone].  Patient Name:   :             Date of Exam:    The following exams were completed during this visit in accordance with the American Academy of Ophthalmology recommendations (2016):  ? 10-2 automated visual field  ? Spectral-domain optical coherence tomography (SD OCT)  ? Multifocal electroretinogram (mfERG)  ? Fundus autofluorescence (FAF)  ? Other (please specify)  Please select from the following:  ? The findings from the above exams are not suggestive of toxicity from Plaquenil (hydroxychloroquine).  ? The findings from the above exams may suggest toxicity from Plaquenil (hydroxychloroquine).  Directly contact the clinic and fax this form.  Please provide additional guidance on whether or not the medication may be continued from your perspective:  Date of next recommended Plaquenil (hydroxychloroquine) screening eye exam:   ? 5 years  ? 1 year  ? 6 months  Other (please specify):   Eye Specialist Name (print):                                                                Date:  Eye Specialist Signature:

## 2021-01-12 DIAGNOSIS — M54.16 LUMBAR BACK PAIN WITH RADICULOPATHY AFFECTING RIGHT LOWER EXTREMITY: ICD-10-CM

## 2021-01-12 RX ORDER — GABAPENTIN 300 MG/1
300 CAPSULE ORAL 3 TIMES DAILY
Qty: 270 CAPSULE | Refills: 0 | Status: SHIPPED | OUTPATIENT
Start: 2021-01-12 | End: 2021-05-21

## 2021-01-14 ENCOUNTER — MEDICAL CORRESPONDENCE (OUTPATIENT)
Dept: HEALTH INFORMATION MANAGEMENT | Facility: CLINIC | Age: 81
End: 2021-01-14

## 2021-01-14 ENCOUNTER — TELEPHONE (OUTPATIENT)
Dept: PHARMACY | Facility: CLINIC | Age: 81
End: 2021-01-14

## 2021-01-14 ENCOUNTER — TRANSFERRED RECORDS (OUTPATIENT)
Dept: HEALTH INFORMATION MANAGEMENT | Facility: CLINIC | Age: 81
End: 2021-01-14

## 2021-01-14 LAB
RETINOPATHY: NEGATIVE
RETINOPATHY: NEGATIVE

## 2021-01-14 RX ORDER — HYDROXYCHLOROQUINE SULFATE 200 MG/1
400 TABLET, FILM COATED ORAL DAILY
Qty: 180 TABLET | Refills: 0 | Status: SHIPPED | OUTPATIENT
Start: 2021-01-14 | End: 2021-04-28

## 2021-01-14 NOTE — TELEPHONE ENCOUNTER
Called pt to f/u on blood sugars - no answer, message left.   Sabrina Meek, MarcosD, JAMES, BCACP  MTM Pharmacist, [facility]

## 2021-01-14 NOTE — TELEPHONE ENCOUNTER
HYDROXYCHLOROQUINE 200 MG TAB      Last Written Prescription Date:  10-8-20  Last Fill Quantity: 180,   # refills: 0  Last Office Visit : 12-11-20  Future Office visit:  2-5-21  Last eye exam: 10-7-19  Eye letter sent    Routing refill request to provider for review/approval because:  Last eye exam > 1 Y

## 2021-01-21 ENCOUNTER — TELEPHONE (OUTPATIENT)
Dept: PHARMACY | Facility: CLINIC | Age: 81
End: 2021-01-21

## 2021-01-21 DIAGNOSIS — Z79.4 TYPE 2 DIABETES MELLITUS WITH HYPERGLYCEMIA, WITH LONG-TERM CURRENT USE OF INSULIN (H): ICD-10-CM

## 2021-01-21 DIAGNOSIS — E11.65 TYPE 2 DIABETES MELLITUS WITH HYPERGLYCEMIA, WITH LONG-TERM CURRENT USE OF INSULIN (H): ICD-10-CM

## 2021-01-21 DIAGNOSIS — E11.69 TYPE 2 DIABETES MELLITUS WITH OTHER SPECIFIED COMPLICATION (H): ICD-10-CM

## 2021-01-21 RX ORDER — INSULIN ASPART 100 [IU]/ML
INJECTION, SOLUTION INTRAVENOUS; SUBCUTANEOUS
Qty: 15 ML | Refills: 0
Start: 2021-01-21 | End: 2021-03-22

## 2021-01-21 RX ORDER — INSULIN GLARGINE 100 [IU]/ML
32 INJECTION, SOLUTION SUBCUTANEOUS DAILY
Qty: 15 ML | Refills: 1
Start: 2021-01-21 | End: 2021-02-03

## 2021-01-21 NOTE — TELEPHONE ENCOUNTER
Called pt to f/u on blood sugar readings:     Fasting readings are still hovering in the mid-100's  134, 149, 160  Pre-meal readings are all still in the 200's, but lower 200's closer to 200-240 now.     Current treatment includes:   Ozempic 1mg  Basaglar 30 units  Novolog 10 units TID plus sliding scale of:   B-199: 1 unit,   200-249: 2 units,   250-299: 3 units,   300-349: 4 units,   >350: 5 units    Additionally she notes she had two bouts of diarrhea - one yesterday and one today. She cannot identify anything that she did that was different. We discussed what we found about her meds and that there are many that may cause diarrhea and discussed if she would like us to start stopping them one by one to see if we could identify which one it might be. She is not interested in this plan. She again states she doesn't think this is Ozempic and does not want to hold this one.     Additionally she notes that a few nights ago the pain started in her foot again. She believes this is gout as the pain started in her big toe and feels like gout. She has plans to call Rheumatology and also has an appointment with them on .     Plan:   1. Increase Basaglar to 32 units daily  2. Increase Novolog to 11 units TID plus sliding scale    MTM to f/u in one week, sooner if needed.     Sabrina Meek, PharmD, JAMES, BCACP  MTM Pharmacist, [facility]

## 2021-01-27 ENCOUNTER — TELEPHONE (OUTPATIENT)
Dept: PALLIATIVE MEDICINE | Facility: CLINIC | Age: 81
End: 2021-01-27

## 2021-01-27 ENCOUNTER — OFFICE VISIT (OUTPATIENT)
Dept: PALLIATIVE MEDICINE | Facility: CLINIC | Age: 81
End: 2021-01-27
Payer: MEDICARE

## 2021-01-27 ENCOUNTER — TELEPHONE (OUTPATIENT)
Dept: RHEUMATOLOGY | Facility: CLINIC | Age: 81
End: 2021-01-27

## 2021-01-27 VITALS — DIASTOLIC BLOOD PRESSURE: 75 MMHG | HEART RATE: 60 BPM | OXYGEN SATURATION: 95 % | SYSTOLIC BLOOD PRESSURE: 134 MMHG

## 2021-01-27 DIAGNOSIS — M54.16 LUMBAR RADICULOPATHY: Primary | ICD-10-CM

## 2021-01-27 PROCEDURE — 99215 OFFICE O/P EST HI 40 MIN: CPT | Performed by: PHYSICAL MEDICINE & REHABILITATION

## 2021-01-27 ASSESSMENT — PAIN SCALES - GENERAL: PAINLEVEL: EXTREME PAIN (8)

## 2021-01-27 NOTE — TELEPHONE ENCOUNTER
Patient is requesting to schedule an injection with the [facility].     This would require the patient to hold:                 Rivaroxagan (Xarelto)       Stop 3 days prior to procedure and restart 24 hours after the procedure      We are requesting your approval to hold the medication for this time frame.    Please keep call open and route back to the PAIN NURSE [[id number]] pool.     If hold approved, we will contact the patient for scheduling.    Thank you.    Routed to Dr Moe Marin RN  Care Coordinator  [facility]

## 2021-01-27 NOTE — PATIENT INSTRUCTIONS
1. No medication changes today.    2. I ordered an epidural injection, you can call the number below to schedule.    3. Follow-up 3-4 weeks after the lumbar epidural steroid injection.    4. We may try a piriformis injection based on your response to the lumbar epidural steroid injection.    Take care,    Charly Moore DO  [facility]        ----------------------------------------------------------------  Clinic Number:  [phone]     Call with any questions about your care and for scheduling assistance.     Calls are returned Monday through Friday between 8 AM and 4:30 PM. We usually get back to you within 2 business days depending on the issue/request.    If we are prescribing your medications:    For opioid medication refills, call the clinic or send a [employer] message 7 days in advance.  Please include:    Name of requested medication    Name of the pharmacy.    For non-opioid medications, call your pharmacy directly to request a refill. Please allow 3-4 days to be processed.     Per [place] State Law:    All controlled substance prescriptions must be filled within 30 days of being written.      For those controlled substances allowing refills, pickup must occur within 30 days of last fill.      We believe regular attendance is key to your success in our program!      Any time you are unable to keep your appointment we ask that you call us at least 24 hours in advance to cancel.This will allow us to offer the appointment time to another patient.     Multiple missed appointments may lead to dismissal from the clinic.

## 2021-01-27 NOTE — TELEPHONE ENCOUNTER
Call Nghia to schedule [phone]    Screening Questions for Radiology Injections:    Injection to be done at which interventional clinic site? [facility]    If [facility] location, tell patient that this procedure requires a COVID-19 lab test be done within 4 days of the procedure. Would you still like to move forward with scheduling the procedure?  Not Applicable   If YES, let patient know that someone will call them to schedule the COVID-19 test and that they will only receive a call back if the result is positive. Route to nursing to enter order.     Instruct patient to arrive as directed prior to the scheduled appointment time:    [place] minutes before      Linda: 30 minutes before; if IV needed 1 hour before     Procedure ordered by Oscar    Procedure ordered? right S1 epidural injection       Transforaminal Cervical ANDRAE - no pain provider currently performing    As a reminder, receiving steroids can decrease your body's ability to fight infection.   Would you still like to move forward with scheduling the injection?  Yes    What insurance would patient like us to bill for this procedure? Medicare/ BCBS      Worker's comp or MVA (motor vehicle accident) -Any injection DO NOT SCHEDULE and route to Shona Vann.      [employer] insurance - For SI joint injections, DO NOT SCHEDULE and route Shona Vann.       ALL BCBS, [employer] and [employer]-Route to Shona for review DO NOT SCHEDULE      IF SCHEDULING IN [place] AND NEEDS A PA, IT IS OKAY TO SCHEDULE. [place] HANDLES THEIR OWN PA'S AFTER THE PATIENT IS SCHEDULED. PLEASE SCHEDULE AT LEAST 1 WEEK OUT SO A PA CAN BE OBTAINED.    Any chance of pregnancy? NO   If YES, do NOT schedule and route to RN pool    Is an  needed? No     Patient has a drive home? (mandatory) YES: INFORMED    Is patient taking any blood thinners (i.e. plavix, coumadin, jantoven, warfarin, heparin, pradaxa or dabigatran, etc)? Yes - Eloquis   If  hold needed, do NOT schedule, route to RN pool     Is patient taking any aspirin products (includes Excedrin and Fiorinal)? No     If more than 325mg/day, OK to schedule; Instruct pt to decrease to less than 325 mg for 7 days AND route to RN pool    For CERVICAL procedures, hold all aspirin products for 6 days.     Tell pt that if aspirin product is not held for 6 days, the procedure WILL BE cancelled.      Does the patient have a bleeding or clotting disorder? No     If YES, okay to schedule AND route to RN nurse pool    For any patients with platelet count <100, must be forwarded to provider    Is patient diabetic?  Yes  If YES, instruct them to bring their glucometer.    Does patient have an active infection or treated for one within the past week? No     Is patient currently taking any antibiotics?  No     For patients on chronic, preventative, or prophylactic antibiotics, procedures may be scheduled.     For patients on antibiotics for active or recent infection:antibiotic course must have been completed for 4 days    Is patient currently taking any steroid medications? (i.e. Prednisone, Medrol)  Yes - prednisone chronic     For patients on steroid medications, course must have been completed for 4 days    Is patient actively being treated for cancer or immunocompromised? Yes - azithromycin chronic  If YES, do NOT schedule and route to RN pool     Are you able to get on and off an exam table with minimal or no assistance? Yes  If NO, do NOT schedule and route to RN pool    Are you able to roll over and lay on your stomach with minimal or no assistance? Yes  If NO, do NOT schedule and route to RN pool     Any allergies to contrast dye, iodine, shellfish, or numbing and steroid medications? No  If YES, route to RN pool AND add allergy information to appointment notes    Allergies: Amoxicillin-pot clavulanate, Augmentin, Codeine, Codeine, Penicillins, Phenobarbital, Phenobarbital, and Seasonal allergies      Has  the patient had a flu shot or any other vaccinations within 7 days before or after the procedure.  Yes - 1/23 1st covid; 2/13 2nd covid     Does patient have an MRI/CT?  YES: 2020  Check Procedure Scheduling Grid to see if required.      Was the MRI done within the last 3 years?  Yes    If yes, where was the MRI done i.e.[facility], [facility], [place], [facility] [place] etc? [facility]      If no, do not schedule and route to RN pool    If MRI was not done at [place], [facility] or [facility] do NOT schedule and route to RN pool.      If pt has an imaging disc, the injection MAY be scheduled but pt has to bring disc to appt.     If they show up without the disc the injection cannot be done    Procedure Specific Instructions:      If celiac plexus block, informed patient NPO for 6 hours and that it is okay to take medications with sips of water, especially blood pressure medications  Not Applicable         If this is for a cervical procedure, informed patient that aspirin needs to be held for 6 days.   Not Applicable      If IV needed:    Do not schedule procedures requiring IV placement in the first appointment of the day or first appointment after lunch. Do NOT schedule at 0745, 0815 or 1245.     Instructed pt to arrive 30 minutes early for IV start if required. (Check Procedure Scheduling Grid)  Not Applicable    Reminders:      If you are started on any steroids or antibiotics between now and your appointment, you must contact us because the procedure may need to be cancelled.  Yes      For all procedures except radiofrequency ablations (RFAs) and spinal cord stimulator (SCS) trials, informed patient:    IV sedation is not provided for this procedure.  If you feel that an oral anti-anxiety medication is needed, you can discuss this further with your referring provider or primary care provider.  The Pain Clinic provider will discuss specifics of what the procedure includes at your appointment.  Most procedures last  10-20 minutes.  We use numbing medications to help with any discomfort during the procedure.  Not Applicable      For patients 85 or older we recommend having an adult stay w/ them for the remainder of the day.       Does the patient have any questions?  NO  Mya Martin  [facility]

## 2021-01-27 NOTE — PROGRESS NOTES
[facility]    Date of visit: 1/27/2021       Assessment:  Sister Betty Buckner is a 80 year old medically complex patient with past medical history including: Atrial fibrillation, VLH, History of DVT, CHF, HLD, HTN, DM 2, Stage 3 CKD, RLS, Depression, History of etoh abuse (remission 30+ years) who presents for evaluation and treatment of the following chronic pain conditions:     1. Right sided hip and leg pain: Patient describes a long standing history of right hip and leg pain with exacerbation in the past year. She reports pain that starts in her right low back/upper buttock and radiates laterally and posteriorly down the right leg to the foot. MRI was independently reviewed today and concerning for right S1 nerve root abutment/impingement. Her symptoms do correlate well with this. On exam she has a positive SLR as well as tenderness in the right piriformis, Neurological exam was normal.  She has had prior lumbar epidural steroid injection but does not recall their efficacy. Reviewed images from her prior L5 transforaminal epidurals, although there is good nerve root spread there is only little epidural spread of contrast. Discussed a right S1 transforaminal and the patient agreed. Discussed that there is the possibility her pain may be related to piriformis dysfunction.     Based on our exam today, discussed that her symptoms are unlikely to be due to hip OA. DURGA and hip scour was negative today. She does have tight hip abductors and extensors which may exacerbate her underlying pain condition.     Plan:  The following recommendations were given to the patient. Diagnosis, treatment options, risks, benefits, and alternatives were discussed, and all questions were answered. The patient expressed understanding of the plan for management.      I am recommending a multidisciplinary treatment plan to help this patient better manage her pain.  This includes:       1. Physical Therapy: Will refer based on response to lumbar epidural steroid injection. She would certainly benefit from exercises for her hip extensors and abductors.  2. Clinical Health Pain Psychologist: Coping well, baseline memory issues, likely to be of limited benefit.  3. Diagnostic Studies: Reviewed lumbar MRI with patient.  4. Medication Management:   1. Continue gabapentin 300mg TID. Consider increasing to 600mg TID.  2. Recommended stopping tramadol and norco due to fogginess.  3. Continue tylenol 1000mg TID prn.  5. Further procedures recommended: right s1 transforaminal liliya ordered.  6. Follow up: 1 month after liliya.       Chief complaint:   Chief Complaint   Patient presents with     Pain       Interval history:  Betty Tee is a 80 year old female last seen by me on 1/7/21.        Since her last visit, Betty Tee reports:  -She continues having severe right leg pain. This starts at the right buttock and radiates laterally and posteriorly to the outside of the right foot.    -she denies any consistent leg weakness, denies any bowel/bladder changes.    -pain can be so severe it causes her leg to buckle at times. She denies any recent falls but did have a fall after her initial liliya this past summer.    -she is not interested in adding more medications at this time due to her already extensive list of mediiations.    Pain scores:  Pain intensity on average is 7 on a scale of 0-10.        Pain Treatments:  1. Medications:       Current pain medications:  -Gabapentin 300mg TID  -Tylenol 1000mg q8h prn  -Escitalopram 20mg daily       Previous pain medications:  -Tramadol 50mg prn  -Norco 5-325mg prn  2. Physical Therapy: hasn't completed PT for low back or hip pain                TENS unit: hasn't tried  3. Pain psychology: hasn't tried  4. Surgery: Lumbar spine surgery in the 1960s.  5. Injections: -Two epidural injections (right L5 tfesi), feb 2020 was helpful the next day but she had a  fall right after, aug 2020 procedure - not helpful, had worsening pain  6. Alternative Therapies:               Chiropractic: hasn't tried               Acupuncture: hasn't tried          Side Effects: no side effect    Medications:  Current Outpatient Medications   Medication Sig Dispense Refill     acetaminophen (TYLENOL) 500 MG tablet Take 1,000 mg by mouth every 8 hours as needed (max 6 tablets/24 hours, 2 tablets/dose)        acyclovir (ZOVIRAX) 400 MG tablet TAKE 1 TABLET (400 MG) BY MOUTH 3 TIMES DAILY FOR A COUPLE DAYS 15 tablet 2     acyclovir (ZOVIRAX) 5 % external ointment Apply topically 6 times daily As needed for outbreaks 15 g 3     albuterol (PROAIR HFA, PROVENTIL HFA, VENTOLIN HFA) 108 (90 BASE) MCG/ACT inhaler Inhale 2 puffs into the lungs every 6 hours 1 Inhaler 3     alendronate (FOSAMAX) 70 MG tablet Take 1 tablet (70 mg) by mouth every 7 days 12 tablet 1     atorvastatin (LIPITOR) 10 MG tablet TAKE 1/2 TABLET BY MOUTH EVERY DAY 45 tablet 3     azithromycin (ZITHROMAX) 250 MG tablet Take 1 tablet (250 mg) by mouth daily 30 tablet 11     CEVIMELINE 30 MG PO capsule TAKE 1 CAPSULE (30 MG) BY MOUTH 3 TIMES DAILY (Patient taking differently: Take 30 mg by mouth every other day ) 270 capsule 2     escitalopram (LEXAPRO) 20 MG tablet TAKE 1 TABLET BY MOUTH EVERY DAY 90 tablet 0     fluticasone (FLONASE) 50 MCG/ACT nasal spray Spray 1-2 sprays into both nostrils daily as needed for allergies 18.2 mL 11     fluticasone-vilanterol (BREO ELLIPTA) 100-25 MCG/INH inhaler Inhale 1 puff into the lungs daily 1 Inhaler 11     gabapentin (NEURONTIN) 300 MG capsule Take 1 capsule (300 mg) by mouth 3 times daily 270 capsule 0     HYDROcodone-acetaminophen (NORCO) 7.5-325 MG per tablet Take 1 tablet by mouth every 12 hours 60 tablet 0     hydroxychloroquine (PLAQUENIL) 200 MG tablet TAKE 2 TABLETS (400 MG) BY MOUTH DAILY ANNUAL PLAQUENIL TOXICITY EYE SCREENING REQUIRED. 180 tablet 0     insulin aspart (NOVOLOG  FLEXPEN) 100 UNIT/ML pen Inject 11 units three times daily under the skin with meals along with sliding scale correction (add one extra unit for every 50 mg/dL over 150) 15 mL 0     insulin glargine (BASAGLAR KWIKPEN) 100 UNIT/ML pen Inject 32 Units Subcutaneous daily 15 mL 1     lidocaine (LIDODERM) 5 % patch Apply patch to painful area for up to 12 h within a 24 h period.  Remove after 12 hours. 30 patch 5     loratadine (CLARITIN) 10 MG tablet TAKE 1 TABLET BY MOUTH EVERY DAY 90 tablet 1     metoprolol succinate ER (TOPROL-XL) 25 MG 24 hr tablet Take 0.5 tablets (12.5 mg) by mouth 2 times daily Take 50 mg by mouth in combination with 12.5 for a total of 62.5 twice a day 90 tablet 3     metoprolol succinate ER (TOPROL-XL) 50 MG 24 hr tablet Take 50 mg by mouth in combination with 12.5 for a total of 62.5 twice a day 180 tablet 3     montelukast (SINGULAIR) 10 MG tablet TAKE 1 TABLET BY MOUTH EVERYDAY AT BEDTIME 90 tablet 0     potassium chloride ER (KLOR-CON) 20 MEQ CR tablet Take 2 tablets (40 mEq) by mouth every morning AND 1 tablet (20 mEq) every evening. 270 tablet 3     predniSONE (DELTASONE) 1 MG tablet Take 1 tablet by mouth daily Take with 5mg tablet for TDD of 6mg       predniSONE (DELTASONE) 5 MG tablet Take 1 tablet by mouth daily Take with 1mg for total daily dose of 6mg       rivaroxaban ANTICOAGULANT (XARELTO ANTICOAGULANT) 20 MG TABS tablet Take 1 tablet (20 mg) by mouth daily (with dinner) 90 tablet 2     semaglutide (OZEMPIC, 1 MG/DOSE,) 2 MG/1.5ML pen Inject 1 mg Subcutaneous every 7 days 3 mL 1     spironolactone (ALDACTONE) 25 MG tablet Take 2 tablets (50 mg) by mouth daily 180 tablet 3     torsemide (DEMADEX) 10 MG tablet TAKE ONE TAB (10 MG) WITH ONE 20 MG TAB TO = 30 MG DAILY IN AFTERNOON. 90 tablet 3     torsemide (DEMADEX) 20 MG tablet TAKE 2 TABS (40 MG) IN AM DAILY AND TAKE 1 TAB BY MOUTH IN THE AFTERNOON ALONG W/ A 10 MG  tablet 4     traMADol (ULTRAM) 50 MG tablet Take 1 tablet  (50 mg) by mouth every 8 hours as needed for severe pain 60 tablet 3     traZODone (DESYREL) 50 MG tablet TAKE 0.5-1.5 TABLETS (25-75 MG) BY MOUTH NIGHTLY AS NEEDED FOR SLEEP 135 tablet 0     vitamin D3 (CHOLECALCIFEROL) 50 mcg (2000 units) tablet Take 1 tablet (50 mcg) by mouth daily 90 tablet 2       Medical History: any changes in medical history since they were last seen? No    Review of Systems:  The 14 system ROS was reviewed from the intake questionnaire, and is positive for: edema, diabetes, gout, frequency, urgency, back pain  Any bowel or bladder problems: frequency, urgency  Mood: denies    Physical Exam:  Blood pressure 134/75, pulse 60, SpO2 95 %, not currently breastfeeding.  General: NAD, pleasant  Gait: Antalgic  MSK exam: Lumbar ROM is mildly reduced in all planes. Pain with palpation of the right piriformis. SLR is positive on the right. Strength is 5/5 and symmetric. Sensation is mildly reduced on the right at the foot, otherwise intact. Reflexes are intact and symmetric. DURGA is negative, hip scour is negative.    BILLING TIME DOCUMENTATION:   The total TIME spent on this patient on the date of the encounter/appointment was 40 minutes.      TOTAL TIME includes:   Time spent preparing to see the patient (reviewing records and tests)   Time spent face to face (or over the phone) with the patient   Time spent ordering tests, medications, procedures and referrals   Time spent documenting clinical information in Epic         Charly Moore DO  [facility]  1/27/2021

## 2021-01-28 NOTE — TELEPHONE ENCOUNTER
No PA required, okay to schedule when blood thinner hold is approved    Shona SARABIA    [facility]

## 2021-01-29 NOTE — TELEPHONE ENCOUNTER
Routing to clarify if patient ok to hold Xarelto if she bridges with Lovenox?  Below is our lovenox guidelines.                   Enoxaparin (Lovenox)       For preventative doses (0.5mg/kg)       Hold for 12 hours before and after procedure     Restart 12 hours after procedure    For therapeutic doses (1mg/kg)       Hold for 24 hours before procedure     Restart 12 hours after procedure        Claudia LANDON, RN Care Coordinator  [facility]  Pain Management

## 2021-02-02 ENCOUNTER — TELEPHONE (OUTPATIENT)
Dept: CARDIOLOGY | Facility: CLINIC | Age: 81
End: 2021-02-02

## 2021-02-02 NOTE — TELEPHONE ENCOUNTER
Ron, Flor Haines, RN  You; Pain Nurse Yesterday (12:53 PM)     Ken Davis,     I clarified with Dr Rosa- as long as Sister Betty is bridged with Lovenox, she is ok to hold the Xarelto.     Flor GOMEZ

## 2021-02-02 NOTE — TELEPHONE ENCOUNTER
[facility] Call Center    Phone Message    May a detailed message be left on voicemail: yes     Reason for Call: Other: Nghia calling because pt is having a steroid injection procedure with  and the xarelto needs to be bridged. She would like a call back to discuss exactly what she should do in regards to pt's medication.    Action Taken: Message routed to:  [facility] cardio    Travel Screening: Not Applicable

## 2021-02-02 NOTE — TELEPHONE ENCOUNTER
Called Nghia and left message that I will work with Dr Ibrahim to come up with a plan to bridge Xarelto and notify them of plan.

## 2021-02-05 ENCOUNTER — VIRTUAL VISIT (OUTPATIENT)
Dept: RHEUMATOLOGY | Facility: CLINIC | Age: 81
End: 2021-02-05
Attending: INTERNAL MEDICINE
Payer: MEDICARE

## 2021-02-05 DIAGNOSIS — M06.00 SERONEGATIVE RHEUMATOID ARTHRITIS (H): ICD-10-CM

## 2021-02-05 DIAGNOSIS — M16.11 OSTEOARTHRITIS OF RIGHT HIP, UNSPECIFIED OSTEOARTHRITIS TYPE: ICD-10-CM

## 2021-02-05 DIAGNOSIS — M10.9 ACUTE GOUT OF LEFT FOOT, UNSPECIFIED CAUSE: Primary | ICD-10-CM

## 2021-02-05 DIAGNOSIS — G89.29 OTHER CHRONIC PAIN: ICD-10-CM

## 2021-02-05 PROCEDURE — 99214 OFFICE O/P EST MOD 30 MIN: CPT | Mod: 95 | Performed by: INTERNAL MEDICINE

## 2021-02-05 ASSESSMENT — PAIN SCALES - GENERAL: PAINLEVEL: WORST PAIN (10)

## 2021-02-05 NOTE — LETTER
2021       RE: Betty Tee  [address]  [facility] [address]     Dear Colleague,    Thank you for referring your patient, Betty Tee, to the [facility] [place] at [facility]. Please see a copy of my visit note below.    Sister Betty is a 80 year old who is being evaluated via a billable video visit.      How would you like to obtain your AVS? Mail a copy  If the video visit is dropped, the invitation should be resent by: Send   Will anyone else be joining your video visit? No      Video Start Time: 5:01 pm  Video-Visit Details    Type of service:  Video Visit    Video End Time: 5:29 pm    Originating Location (pt. Location): Home    Distant Location (provider location):  [facility] [place]     Platform used for Video Visit: [facility]       Rheumatology Clinic Virtual Visit Note  Zulma Lopez MD  DOS: 2021  Date of last visit: 2020    Name: Betty Tee  MRN: [id number]  Age: 80 year old  : 1940  Reason for visit: Follow up visit for R hip pain sec severe OA, PMR, presumed gout flare of L foot, primary Sjogren's syndrome    # Sjogren's syndrome since  with borderline +RG, +SSA, and lip biopsy focus score of 1. Ongoing dry mouth on evoxac qod  # Follicular bronchiolitis, prior mild ILD   # Osteopenia on DEXA 2019 with T score=-2.1 with decline in bone density on fosamax  # Chronic prednisone use  # DM  # A fib  # Severe R hip OA with upcoming local steroid inj on norco prn  # PMR stable on prednisone 5 mg every day  #Presumed gout flare in L foot started today      Assessment and Plan:    New Dx of PMR. Severe R hip pain is due to severe OA based on 2020 hip MRI. She prefers to avoid hip arthroplasty. Her inflammation markers improved on prednisone, PMR responded to prednisone, now is 5 mg qd. Recurrent presumed gout flare today (not crystal proven)  responded to prednisone high dose in the past. Lab did not draw blood for ESR/CRP/SUA between flares.    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 Completed pulmonary rehab in 2/2019 where she was able to exercise without oxygen. GFR improved since last visit.    On HCQ since 4/2019 with significant improvement of joint pain. No retinal toxicity on eye exam done 1/2021. Tolerates HCQ well.     Sister Betty woke up today with flare of presumed gout (or pseudogout given previous nl SUA) over L foot. She responded to high dose prednisone (40-30-20-10 mg every day each for 3 days) in the past, now is on 5 mg every day which was tapered from 10 mg every day in 12/2020.  Given her DM, osteopenia, highly recommend to start using anakinra prn for gout flares, 100 mg every day x 3 days prn; risks were discussed. Meanwhile, awaiting insurance approval, advised to increase prednisone to 40 mg every day just for 3 days then drop down to 5 mg every day. This way, would not interfere with 2nd covid vaccine 2/11 and upcoming R hip inj 2/24.      Plan:    Prednisone 40 mg a day x 3 days (Sat, Sun, Mon) then drop down to 5 mg a day    Norco as needed for pain, was refilled (she rarely takes it)    Cevimeline for dry mouth could be taken 3 times a day (she takes it every other day as does not like to take so many pills)    Plan is to get insurance approval for anakinra 100 mg a day x 3 days as needed for gout flares    Labs when gout flare is over as SUA could drop during flares    Follow up with Dr. See, keep local steroid inj to R hip    Stay on fosamax weekly    Continue  mg bid    Yearly eye exam on HCQ, due 1/2022    Return in 4-5 months        Orders Placed This Encounter   Procedures     CRP inflammation     Uric acid     Erythrocyte sedimentation rate auto           Zulma Lopez MD         HPI:    Betty Tee is a 80 year old WF with a history of primary Sjogren's syndrome, PMR, OA who presents for follow-up. She was diagnosed with Sjogren's syndrome in 2015 with borderline +RG, +SSA, and lip biopsy focus score of 1. Associated ILD and joint pain are prednisone-responsive which support the diagnosis.     Today  2/5/2021: Sister Betty woke up with pain/swelling of L foot. She is on prednisone 5 mg a day, she was given prednisone taper recently for possible L foot gout flare which helped, this was 10 mg every day at last visit in 12/2020. She has severe R hip pain. Saw ortho, had R hip MRI on 9/5/2020 which showed severe OA, R hip arthroplasty was recommended. She would prefer to delay R hip arthroplasty, now scheduled for R hip inj on 2/24, was told to avoid systemic steroid around that time. Is due for 2nd COVID vaccine on 2/11, 1st dose caused no SE. She takes tylenol which does not help. NSAIDs are not allowed with CKD. Norco helps but she does not like to be dependent on it, takes it rarely.    Has dry mouth. SOB is stable at baseline. No other complaints.    Review of Systems:   A comprehensive ROS was done. Positives are per HPI.      Active Medications:     Outpatient Medications Prior to Visit   Medication Sig Dispense Refill     acetaminophen (TYLENOL) 500 MG tablet Take 1,000 mg by mouth every 8 hours as needed (max 6 tablets/24 hours, 2 tablets/dose)        acyclovir (ZOVIRAX) 400 MG tablet TAKE 1 TABLET (400 MG) BY MOUTH 3 TIMES DAILY FOR A COUPLE DAYS 15 tablet 2     acyclovir (ZOVIRAX) 5 % external ointment Apply topically 6 times daily As needed for outbreaks 15 g 3     albuterol (PROAIR HFA, PROVENTIL HFA, VENTOLIN HFA) 108 (90 BASE) MCG/ACT inhaler Inhale 2 puffs into the lungs every 6 hours 1 Inhaler 3     alendronate (FOSAMAX) 70 MG tablet Take 1 tablet (70 mg) by mouth every 7 days 12 tablet 1     atorvastatin (LIPITOR) 10 MG tablet TAKE 1/2 TABLET BY MOUTH EVERY DAY 45 tablet 3      azithromycin (ZITHROMAX) 250 MG tablet Take 1 tablet (250 mg) by mouth daily 30 tablet 11     CEVIMELINE 30 MG PO capsule TAKE 1 CAPSULE (30 MG) BY MOUTH 3 TIMES DAILY (Patient taking differently: Take 30 mg by mouth every other day ) 270 capsule 2     escitalopram (LEXAPRO) 20 MG tablet TAKE 1 TABLET BY MOUTH EVERY DAY 90 tablet 0     fluticasone (FLONASE) 50 MCG/ACT nasal spray Spray 1-2 sprays into both nostrils daily as needed for allergies 18.2 mL 11     fluticasone-vilanterol (BREO ELLIPTA) 100-25 MCG/INH inhaler Inhale 1 puff into the lungs daily 1 Inhaler 11     gabapentin (NEURONTIN) 300 MG capsule Take 1 capsule (300 mg) by mouth 3 times daily 270 capsule 0     HYDROcodone-acetaminophen (NORCO) 7.5-325 MG per tablet Take 1 tablet by mouth every 12 hours 60 tablet 0     hydroxychloroquine (PLAQUENIL) 200 MG tablet TAKE 2 TABLETS (400 MG) BY MOUTH DAILY ANNUAL PLAQUENIL TOXICITY EYE SCREENING REQUIRED. 180 tablet 0     insulin aspart (NOVOLOG FLEXPEN) 100 UNIT/ML pen Inject 11 units three times daily under the skin with meals along with sliding scale correction (add one extra unit for every 50 mg/dL over 150) 15 mL 0     insulin glargine (BASAGLAR KWIKPEN) 100 UNIT/ML pen INJECT 22 UNITS SUBCUTANEOUS DAILY (TO REPLACE LANTUS) 15 mL 1     lidocaine (LIDODERM) 5 % patch Apply patch to painful area for up to 12 h within a 24 h period.  Remove after 12 hours. 30 patch 5     loratadine (CLARITIN) 10 MG tablet TAKE 1 TABLET BY MOUTH EVERY DAY 90 tablet 1     metoprolol succinate ER (TOPROL-XL) 25 MG 24 hr tablet Take 0.5 tablets (12.5 mg) by mouth 2 times daily Take 50 mg by mouth in combination with 12.5 for a total of 62.5 twice a day 90 tablet 3     metoprolol succinate ER (TOPROL-XL) 50 MG 24 hr tablet Take 50 mg by mouth in combination with 12.5 for a total of 62.5 twice a day 180 tablet 3     montelukast (SINGULAIR) 10 MG tablet TAKE 1 TABLET BY MOUTH EVERYDAY AT BEDTIME 90 tablet 0     potassium  chloride ER (KLOR-CON) 20 MEQ CR tablet Take 2 tablets (40 mEq) by mouth every morning AND 1 tablet (20 mEq) every evening. 270 tablet 3     predniSONE (DELTASONE) 1 MG tablet Take 1 tablet by mouth daily Take with 5mg tablet for TDD of 6mg       predniSONE (DELTASONE) 5 MG tablet Take 1 tablet by mouth daily Take with 1mg for total daily dose of 6mg       rivaroxaban ANTICOAGULANT (XARELTO ANTICOAGULANT) 20 MG TABS tablet Take 1 tablet (20 mg) by mouth daily (with dinner) 90 tablet 2     semaglutide (OZEMPIC, 1 MG/DOSE,) 2 MG/1.5ML pen Inject 1 mg Subcutaneous every 7 days 3 mL 1     spironolactone (ALDACTONE) 25 MG tablet Take 2 tablets (50 mg) by mouth daily 180 tablet 3     torsemide (DEMADEX) 10 MG tablet TAKE ONE TAB (10 MG) WITH ONE 20 MG TAB TO = 30 MG DAILY IN AFTERNOON. 90 tablet 3     torsemide (DEMADEX) 20 MG tablet TAKE 2 TABS (40 MG) IN AM DAILY AND TAKE 1 TAB BY MOUTH IN THE AFTERNOON ALONG W/ A 10 MG  tablet 4     traMADol (ULTRAM) 50 MG tablet Take 1 tablet (50 mg) by mouth every 8 hours as needed for severe pain 60 tablet 3     traZODone (DESYREL) 50 MG tablet TAKE 0.5-1.5 TABLETS (25-75 MG) BY MOUTH NIGHTLY AS NEEDED FOR SLEEP 135 tablet 0     vitamin D3 (CHOLECALCIFEROL) 50 mcg (2000 units) tablet Take 1 tablet (50 mcg) by mouth daily 90 tablet 2     No facility-administered medications prior to visit.      Allergies:   Augmentin\  Codeine  Phenobarbital  Seasonal allergies      Past Medical History:  Alcohol abuse, in remission.   Allergic rhinitis.   Antiplatelet long-term use.   Atrial fibrillation.   Cardiomegaly.   Osteopenia.   Diverticulosis.   Benign essential hypertension.   GERD.   Insomnia.   Irregular heart beat.   Lumbago.   Major depressive disorder, recurrent episode, moderate.   ANGELICA.  Osteoarthrosis.   Sjogren's syndrome.   Sleep apnea.   Tobacco use disorder.   TMJ disorder.   RLS.  Major depressive disorder.   Hypertension.   Sciatica.   Colouterine fistula.   Left  ventricular hypertrophy.   Breat fibroadenoma.   DVT.   Fatty liver disease, nonalcoholic.   Rib pain.   Neck mass.   Interstitial lung disease.   Acute and chronic respiratory failure with hypoxia.   Type II diabetes mellitus.   Hyperlipidemia.   Inflammatory arthritis.      Past Surgical History:  Back surgery.   Left breast biopsy.   Appendectomy.   Exploratory laparotomy.   Cardiac surgery.   Cholecystectomy.   Left colectomy.   Total abdominal hysterectomy with bilateral salpingo-oophorectomy.   Insert ureter stent.   Flexible sigmoidoscopy.   Ileostomy takedown.     Family History:   Mother: Positive for CAD, heart disease, hypertension, cerebrovascular disease, hyperlipidemia.   Father: Positive for alcohol/drug abuse, Alzheimer disease, dementia, hypertension, hyperlipidemia.   Sister: Positive for CAD, hypertension, and colorectal cancer.   Sister: Positive for hypertension and hyperlipidemia.   Sister: Positive for diabetes, lung cancer, asthma, and heart disease.   Brother: Positive for Parkinsonism and substance abuse.   Brother: Positive for hypertension, diverticulitis, and prostate cancer.   Daughter: Positive for breast cancer.        Social History:   She is a former smoker; quit in 2011. She has a history of alcohol use but stopped drinking in 1986.      Physical Exam:   Constitutional: NAD, very pleasant, sister Ngiha present to help  Eyes: Normal EOM, conjunctiva, sclera  MS: redness/tenderness/swelling over lateral side of L foot  Skin: No alopecia, rash  Neuro: grossly non-focal  Psych: Normal affect.    Images:  CT Chest w/o contrast (7/25/18):  Findings remain most consistent with small airway disease  and/or nonclassifiable interstitial lung disease and are not  significantly changed from the March 2017 comparison.    Per radiology.    Laboratory:   RHEUM RESULTS Latest Ref Rng & Units 10/20/2020 12/4/2020 1/5/2021   COMPLEMENT C3 81 - 157 mg/dL - - -   COMPLEMENT C4 13 - 39 mg/dL - - -   SED  RATE 0 - 30 mm/h - 15 -   CRP, INFLAMMATION 0.0 - 8.0 mg/L - 15.0(H) -   CK TOTAL 30 - 225 U/L - - -   RHEUMATOID FACTOR <12 IU/mL - - -   RG SCREEN BY EIA <1.0 - - -   AST 0 - 45 U/L - 18 -   ALT 0 - 50 U/L - 20 -   ALBUMIN 3.4 - 5.0 g/dL - 3.1(L) -   WBC 4.0 - 11.0 10e9/L - 10.3 -   RBC 3.8 - 5.2 10e12/L - 4.38 -   HGB 11.7 - 15.7 g/dL - 13.2 -   HCT 35.0 - 47.0 % - 41.2 -   MCV 78 - 100 fl - 94 -   MCHC 31.5 - 36.5 g/dL - 32.0 -   RDW 10.0 - 15.0 % - 15.0 -    - 450 10e9/L - 189 -   CREATININE 0.52 - 1.04 mg/dL 1.16(H) 1.49(H) 1.10(H)   GFR ESTIMATE, IF BLACK >60 mL/min/[1.73:m2] 51(L) 38(L) 55(L)   GFR ESTIMATE >60 mL/min/[1.73:m2] 44(L) 33(L) 47(L)    - 1,616 mg/dL - - -   IGA 84 - 499 mg/dL - - -   IGM 35 - 242 mg/dL - - -       Rheumatoid Factor   Date Value Ref Range Status   07/24/2015 <20 <20 IU/mL Final   ,  ,   Cyclic Cit Pept IgG/IgA   Date Value Ref Range Status   07/24/2015 <20  Interpretation:  Negative   <20 UNITS Final   ,  ,   Scleroderma Antibody Scl-70 CECILIA IgG   Date Value Ref Range Status   07/24/2015  0.0 - 0.9 AI Final    <0.2  Negative   Antibody index (AI) values reflect qualitative changes in antibody   concentration that cannot be directly associated with clinical condition or   disease state.       SSA (Ro) (CECILIA) Antibody, IgG   Date Value Ref Range Status   07/24/2015 1.6 (H) 0.0 - 0.9 AI Final     Comment:     Positive   Antibody index (AI) values reflect qualitative changes in antibody   concentration that cannot be directly associated with clinical condition or   disease state.       SSB (La) (CECILIA) Antibody, IgG   Date Value Ref Range Status   07/24/2015  0.0 - 0.9 AI Final    <0.2  Negative   Antibody index (AI) values reflect qualitative changes in antibody   concentration that cannot be directly associated with clinical condition or   disease state.       ,  ,   RG Screen by EIA   Date Value Ref Range Status   07/24/2015 <1.0  Interpretation:  Negative   <1.0 Final    ,  ,  ,  ,  ,  ,  ,  ,  ,  ,  ,  ,  ,  ,  ,  ,  ,  ,   Neutrophil Cytoplasmic IgG Antibody   Date Value Ref Range Status   07/24/2015   Final    <1:20  Reference range: <1:20  (Note)  The ANCA IFA is <1:20; therefore, no further testing will  be performed.  INTERPRETIVE INFORMATION: Anti-Neutrophil Cyto Ab, IgG  Neutrophil Cytoplasmic Antibodies (C-ANCA = granular  cytoplasmic staining, P-ANCA = perinuclear staining) are  found in the serum of over 90 percent of patients with  certain necrotizing systemic vasculitides, and usually in  less than 5 percent of patients with collagen vascular  disease or arthritis.  Performed by [facility],  [address] [place] [id number] [phone]  [email or web address], Burke Mckeon MD, Lab. Director       ,  ,  ,  ,  ,  ,  ,   IGG   Date Value Ref Range Status   07/24/2015 1320 695 - 1620 mg/dL Final   ,  ,  ,  ,  ,   Scleroderma Antibody Scl-70 CECILIA IgG   Date Value Ref Range Status   07/24/2015  0.0 - 0.9 AI Final    <0.2  Negative   Antibody index (AI) values reflect qualitative changes in antibody   concentration that cannot be directly associated with clinical condition or   disease state.       Component      Latest Ref Rng & Units 12/4/2020   WBC      4.0 - 11.0 10e9/L 10.3   RBC Count      3.8 - 5.2 10e12/L 4.38   Hemoglobin      11.7 - 15.7 g/dL 13.2   Hematocrit      35.0 - 47.0 % 41.2   MCV      78 - 100 fl 94   MCH      26.5 - 33.0 pg 30.1   MCHC      31.5 - 36.5 g/dL 32.0   RDW      10.0 - 15.0 % 15.0   Platelet Count      150 - 450 10e9/L 189   % Neutrophils      % 72.8   % Lymphocytes      % 14.4   % Monocytes      % 10.6   % Eosinophils      % 1.7   % Basophils      % 0.5   Absolute Neutrophil      1.6 - 8.3 10e9/L 7.5   Absolute Lymphocytes      0.8 - 5.3 10e9/L 1.5   Absolute Monocytes      0.0 - 1.3 10e9/L 1.1   Absolute Eosinophils      0.0 - 0.7 10e9/L 0.2   Absolute Basophils      0.0 - 0.2 10e9/L 0.1   Diff Method       Automated Method   Sodium       133 - 144 mmol/L 138   Potassium      3.4 - 5.3 mmol/L 3.6   Chloride      94 - 109 mmol/L 105   Carbon Dioxide      20 - 32 mmol/L 27   Anion Gap      3 - 14 mmol/L 6   Glucose      70 - 99 mg/dL 176 (H)   Urea Nitrogen      7 - 30 mg/dL 16   Creatinine      0.52 - 1.04 mg/dL 1.49 (H)   GFR Estimate      >60 mL/min/1.73:m2 33 (L)   GFR Estimate If Black      >60 mL/min/1.73:m2 38 (L)   Calcium      8.5 - 10.1 mg/dL 9.4   Bilirubin Total      0.2 - 1.3 mg/dL 0.6   Albumin      3.4 - 5.0 g/dL 3.1 (L)   Protein Total      6.8 - 8.8 g/dL 7.3   Alkaline Phosphatase      40 - 150 U/L 95   ALT      0 - 50 U/L 20   AST      0 - 45 U/L 18   Sed Rate      0 - 30 mm/h 15   CRP Inflammation      0.0 - 8.0 mg/L 15.0 (H)

## 2021-02-05 NOTE — PROGRESS NOTES
Sister Betty is a 80 year old who is being evaluated via a billable video visit.      How would you like to obtain your AVS? Mail a copy  If the video visit is dropped, the invitation should be resent by: Send   Will anyone else be joining your video visit? No      Video Start Time: 5:01 pm  Video-Visit Details    Type of service:  Video Visit    Video End Time: 5:29 pm    Originating Location (pt. Location): Home    Distant Location (provider location):  [facility] RHEUMATOLOGY [facility] [place]     Platform used for Video Visit: [facility]       Rheumatology Clinic Virtual Visit Note  Zulma Lopez MD  DOS: 2021  Date of last visit: 2020    Name: Betty Tee  MRN: [id number]  Age: 80 year old  : 1940  Reason for visit: Follow up visit for R hip pain sec severe OA, PMR, presumed gout flare of L foot, primary Sjogren's syndrome    # Sjogren's syndrome since  with borderline +RG, +SSA, and lip biopsy focus score of 1. Ongoing dry mouth on evoxac qod  # Follicular bronchiolitis, prior mild ILD   # Osteopenia on DEXA 2019 with T score=-2.1 with decline in bone density on fosamax  # Chronic prednisone use  # DM  # A fib  # Severe R hip OA with upcoming local steroid inj on norco prn  # PMR stable on prednisone 5 mg every day  #Presumed gout flare in L foot started today      Assessment and Plan:    New Dx of PMR. Severe R hip pain is due to severe OA based on 2020 hip MRI. She prefers to avoid hip arthroplasty. Her inflammation markers improved on prednisone, PMR responded to prednisone, now is 5 mg qd. Recurrent presumed gout flare today (not crystal proven) responded to prednisone high dose in the past. Lab did not draw blood for ESR/CRP/SUA between flares.    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 Completed pulmonary rehab in 2019 where she was able to  exercise without oxygen. GFR improved since last visit.    On HCQ since 4/2019 with significant improvement of joint pain. No retinal toxicity on eye exam done 1/2021. Tolerates HCQ well.     Sister Betty woke up today with flare of presumed gout (or pseudogout given previous nl SUA) over L foot. She responded to high dose prednisone (40-30-20-10 mg every day each for 3 days) in the past, now is on 5 mg every day which was tapered from 10 mg every day in 12/2020.  Given her DM, osteopenia, highly recommend to start using anakinra prn for gout flares, 100 mg every day x 3 days prn; risks were discussed. Meanwhile, awaiting insurance approval, advised to increase prednisone to 40 mg every day just for 3 days then drop down to 5 mg every day. This way, would not interfere with 2nd covid vaccine 2/11 and upcoming R hip inj 2/24.      Plan:    Prednisone 40 mg a day x 3 days (Sat, Sun, Mon) then drop down to 5 mg a day    Norco as needed for pain, was refilled (she rarely takes it)    Cevimeline for dry mouth could be taken 3 times a day (she takes it every other day as does not like to take so many pills)    Plan is to get insurance approval for anakinra 100 mg a day x 3 days as needed for gout flares    Labs when gout flare is over as SUA could drop during flares    Follow up with Dr. See, keep local steroid inj to R hip    Stay on fosamax weekly    Continue  mg bid    Yearly eye exam on HCQ, due 1/2022    Return in 4-5 months        Orders Placed This Encounter   Procedures     CRP inflammation     Uric acid     Erythrocyte sedimentation rate auto           Zulma Lopez MD         HPI:   Betty Tee is a 80 year old WF with a history of primary Sjogren's syndrome, PMR, OA who presents for follow-up. She was diagnosed with Sjogren's syndrome in 2015 with borderline +RG, +SSA, and lip biopsy focus score of 1. Associated ILD and joint pain are prednisone-responsive which support the diagnosis.      Today  2/5/2021: Sister Betty woke up with pain/swelling of L foot. She is on prednisone 5 mg a day, she was given prednisone taper recently for possible L foot gout flare which helped, this was 10 mg every day at last visit in 12/2020. She has severe R hip pain. Saw ortho, had R hip MRI on 9/5/2020 which showed severe OA, R hip arthroplasty was recommended. She would prefer to delay R hip arthroplasty, now scheduled for R hip inj on 2/24, was told to avoid systemic steroid around that time. Is due for 2nd COVID vaccine on 2/11, 1st dose caused no SE. She takes tylenol which does not help. NSAIDs are not allowed with CKD. Norco helps but she does not like to be dependent on it, takes it rarely.    Has dry mouth. SOB is stable at baseline. No other complaints.    Review of Systems:   A comprehensive ROS was done. Positives are per HPI.      Active Medications:     Outpatient Medications Prior to Visit   Medication Sig Dispense Refill     acetaminophen (TYLENOL) 500 MG tablet Take 1,000 mg by mouth every 8 hours as needed (max 6 tablets/24 hours, 2 tablets/dose)        acyclovir (ZOVIRAX) 400 MG tablet TAKE 1 TABLET (400 MG) BY MOUTH 3 TIMES DAILY FOR A COUPLE DAYS 15 tablet 2     acyclovir (ZOVIRAX) 5 % external ointment Apply topically 6 times daily As needed for outbreaks 15 g 3     albuterol (PROAIR HFA, PROVENTIL HFA, VENTOLIN HFA) 108 (90 BASE) MCG/ACT inhaler Inhale 2 puffs into the lungs every 6 hours 1 Inhaler 3     alendronate (FOSAMAX) 70 MG tablet Take 1 tablet (70 mg) by mouth every 7 days 12 tablet 1     atorvastatin (LIPITOR) 10 MG tablet TAKE 1/2 TABLET BY MOUTH EVERY DAY 45 tablet 3     azithromycin (ZITHROMAX) 250 MG tablet Take 1 tablet (250 mg) by mouth daily 30 tablet 11     CEVIMELINE 30 MG PO capsule TAKE 1 CAPSULE (30 MG) BY MOUTH 3 TIMES DAILY (Patient taking differently: Take 30 mg by mouth every other day ) 270 capsule 2     escitalopram (LEXAPRO) 20 MG tablet TAKE 1 TABLET BY MOUTH  EVERY DAY 90 tablet 0     fluticasone (FLONASE) 50 MCG/ACT nasal spray Spray 1-2 sprays into both nostrils daily as needed for allergies 18.2 mL 11     fluticasone-vilanterol (BREO ELLIPTA) 100-25 MCG/INH inhaler Inhale 1 puff into the lungs daily 1 Inhaler 11     gabapentin (NEURONTIN) 300 MG capsule Take 1 capsule (300 mg) by mouth 3 times daily 270 capsule 0     HYDROcodone-acetaminophen (NORCO) 7.5-325 MG per tablet Take 1 tablet by mouth every 12 hours 60 tablet 0     hydroxychloroquine (PLAQUENIL) 200 MG tablet TAKE 2 TABLETS (400 MG) BY MOUTH DAILY ANNUAL PLAQUENIL TOXICITY EYE SCREENING REQUIRED. 180 tablet 0     insulin aspart (NOVOLOG FLEXPEN) 100 UNIT/ML pen Inject 11 units three times daily under the skin with meals along with sliding scale correction (add one extra unit for every 50 mg/dL over 150) 15 mL 0     insulin glargine (BASAGLAR KWIKPEN) 100 UNIT/ML pen INJECT 22 UNITS SUBCUTANEOUS DAILY (TO REPLACE LANTUS) 15 mL 1     lidocaine (LIDODERM) 5 % patch Apply patch to painful area for up to 12 h within a 24 h period.  Remove after 12 hours. 30 patch 5     loratadine (CLARITIN) 10 MG tablet TAKE 1 TABLET BY MOUTH EVERY DAY 90 tablet 1     metoprolol succinate ER (TOPROL-XL) 25 MG 24 hr tablet Take 0.5 tablets (12.5 mg) by mouth 2 times daily Take 50 mg by mouth in combination with 12.5 for a total of 62.5 twice a day 90 tablet 3     metoprolol succinate ER (TOPROL-XL) 50 MG 24 hr tablet Take 50 mg by mouth in combination with 12.5 for a total of 62.5 twice a day 180 tablet 3     montelukast (SINGULAIR) 10 MG tablet TAKE 1 TABLET BY MOUTH EVERYDAY AT BEDTIME 90 tablet 0     potassium chloride ER (KLOR-CON) 20 MEQ CR tablet Take 2 tablets (40 mEq) by mouth every morning AND 1 tablet (20 mEq) every evening. 270 tablet 3     predniSONE (DELTASONE) 1 MG tablet Take 1 tablet by mouth daily Take with 5mg tablet for TDD of 6mg       predniSONE (DELTASONE) 5 MG tablet Take 1 tablet by mouth daily Take with  1mg for total daily dose of 6mg       rivaroxaban ANTICOAGULANT (XARELTO ANTICOAGULANT) 20 MG TABS tablet Take 1 tablet (20 mg) by mouth daily (with dinner) 90 tablet 2     semaglutide (OZEMPIC, 1 MG/DOSE,) 2 MG/1.5ML pen Inject 1 mg Subcutaneous every 7 days 3 mL 1     spironolactone (ALDACTONE) 25 MG tablet Take 2 tablets (50 mg) by mouth daily 180 tablet 3     torsemide (DEMADEX) 10 MG tablet TAKE ONE TAB (10 MG) WITH ONE 20 MG TAB TO = 30 MG DAILY IN AFTERNOON. 90 tablet 3     torsemide (DEMADEX) 20 MG tablet TAKE 2 TABS (40 MG) IN AM DAILY AND TAKE 1 TAB BY MOUTH IN THE AFTERNOON ALONG W/ A 10 MG  tablet 4     traMADol (ULTRAM) 50 MG tablet Take 1 tablet (50 mg) by mouth every 8 hours as needed for severe pain 60 tablet 3     traZODone (DESYREL) 50 MG tablet TAKE 0.5-1.5 TABLETS (25-75 MG) BY MOUTH NIGHTLY AS NEEDED FOR SLEEP 135 tablet 0     vitamin D3 (CHOLECALCIFEROL) 50 mcg (2000 units) tablet Take 1 tablet (50 mcg) by mouth daily 90 tablet 2     No facility-administered medications prior to visit.      Allergies:   Augmentin\  Codeine  Phenobarbital  Seasonal allergies      Past Medical History:  Alcohol abuse, in remission.   Allergic rhinitis.   Antiplatelet long-term use.   Atrial fibrillation.   Cardiomegaly.   Osteopenia.   Diverticulosis.   Benign essential hypertension.   GERD.   Insomnia.   Irregular heart beat.   Lumbago.   Major depressive disorder, recurrent episode, moderate.   ANGELICA.  Osteoarthrosis.   Sjogren's syndrome.   Sleep apnea.   Tobacco use disorder.   TMJ disorder.   RLS.  Major depressive disorder.   Hypertension.   Sciatica.   Colouterine fistula.   Left ventricular hypertrophy.   Breat fibroadenoma.   DVT.   Fatty liver disease, nonalcoholic.   Rib pain.   Neck mass.   Interstitial lung disease.   Acute and chronic respiratory failure with hypoxia.   Type II diabetes mellitus.   Hyperlipidemia.   Inflammatory arthritis.      Past Surgical History:  Back surgery.   Left  breast biopsy.   Appendectomy.   Exploratory laparotomy.   Cardiac surgery.   Cholecystectomy.   Left colectomy.   Total abdominal hysterectomy with bilateral salpingo-oophorectomy.   Insert ureter stent.   Flexible sigmoidoscopy.   Ileostomy takedown.     Family History:   Mother: Positive for CAD, heart disease, hypertension, cerebrovascular disease, hyperlipidemia.   Father: Positive for alcohol/drug abuse, Alzheimer disease, dementia, hypertension, hyperlipidemia.   Sister: Positive for CAD, hypertension, and colorectal cancer.   Sister: Positive for hypertension and hyperlipidemia.   Sister: Positive for diabetes, lung cancer, asthma, and heart disease.   Brother: Positive for Parkinsonism and substance abuse.   Brother: Positive for hypertension, diverticulitis, and prostate cancer.   Daughter: Positive for breast cancer.        Social History:   She is a former smoker; quit in 2011. She has a history of alcohol use but stopped drinking in 1986.      Physical Exam:   Constitutional: NAD, very pleasant, sister Nghia present to help  Eyes: Normal EOM, conjunctiva, sclera  MS: redness/tenderness/swelling over lateral side of L foot  Skin: No alopecia, rash  Neuro: grossly non-focal  Psych: Normal affect.    Images:  CT Chest w/o contrast (7/25/18):  Findings remain most consistent with small airway disease  and/or nonclassifiable interstitial lung disease and are not  significantly changed from the March 2017 comparison.    Per radiology.    Laboratory:   RHEUM RESULTS Latest Ref Rng & Units 10/20/2020 12/4/2020 1/5/2021   COMPLEMENT C3 81 - 157 mg/dL - - -   COMPLEMENT C4 13 - 39 mg/dL - - -   SED RATE 0 - 30 mm/h - 15 -   CRP, INFLAMMATION 0.0 - 8.0 mg/L - 15.0(H) -   CK TOTAL 30 - 225 U/L - - -   RHEUMATOID FACTOR <12 IU/mL - - -   RG SCREEN BY EIA <1.0 - - -   AST 0 - 45 U/L - 18 -   ALT 0 - 50 U/L - 20 -   ALBUMIN 3.4 - 5.0 g/dL - 3.1(L) -   WBC 4.0 - 11.0 10e9/L - 10.3 -   RBC 3.8 - 5.2 10e12/L - 4.38 -    HGB 11.7 - 15.7 g/dL - 13.2 -   HCT 35.0 - 47.0 % - 41.2 -   MCV 78 - 100 fl - 94 -   MCHC 31.5 - 36.5 g/dL - 32.0 -   RDW 10.0 - 15.0 % - 15.0 -    - 450 10e9/L - 189 -   CREATININE 0.52 - 1.04 mg/dL 1.16(H) 1.49(H) 1.10(H)   GFR ESTIMATE, IF BLACK >60 mL/min/[1.73:m2] 51(L) 38(L) 55(L)   GFR ESTIMATE >60 mL/min/[1.73:m2] 44(L) 33(L) 47(L)    - 1,616 mg/dL - - -   IGA 84 - 499 mg/dL - - -   IGM 35 - 242 mg/dL - - -       Rheumatoid Factor   Date Value Ref Range Status   07/24/2015 <20 <20 IU/mL Final   ,  ,   Cyclic Cit Pept IgG/IgA   Date Value Ref Range Status   07/24/2015 <20  Interpretation:  Negative   <20 UNITS Final   ,  ,   Scleroderma Antibody Scl-70 CECILIA IgG   Date Value Ref Range Status   07/24/2015  0.0 - 0.9 AI Final    <0.2  Negative   Antibody index (AI) values reflect qualitative changes in antibody   concentration that cannot be directly associated with clinical condition or   disease state.       SSA (Ro) (CECILIA) Antibody, IgG   Date Value Ref Range Status   07/24/2015 1.6 (H) 0.0 - 0.9 AI Final     Comment:     Positive   Antibody index (AI) values reflect qualitative changes in antibody   concentration that cannot be directly associated with clinical condition or   disease state.       SSB (La) (CECILIA) Antibody, IgG   Date Value Ref Range Status   07/24/2015  0.0 - 0.9 AI Final    <0.2  Negative   Antibody index (AI) values reflect qualitative changes in antibody   concentration that cannot be directly associated with clinical condition or   disease state.       ,  ,   RG Screen by EIA   Date Value Ref Range Status   07/24/2015 <1.0  Interpretation:  Negative   <1.0 Final   ,  ,  ,  ,  ,  ,  ,  ,  ,  ,  ,  ,  ,  ,  ,  ,  ,  ,   Neutrophil Cytoplasmic IgG Antibody   Date Value Ref Range Status   07/24/2015   Final    <1:20  Reference range: <1:20  (Note)  The ANCA IFA is <1:20; therefore, no further testing will  be performed.  INTERPRETIVE INFORMATION: Anti-Neutrophil Cyto Ab,  IgG  Neutrophil Cytoplasmic Antibodies (C-ANCA = granular  cytoplasmic staining, P-ANCA = perinuclear staining) are  found in the serum of over 90 percent of patients with  certain necrotizing systemic vasculitides, and usually in  less than 5 percent of patients with collagen vascular  disease or arthritis.  Performed by [facility],  [facility] [place] [address] [phone]  [email or web address], Burke Mckeon MD, Lab. Director       ,  ,  ,  ,  ,  ,  ,   IGG   Date Value Ref Range Status   07/24/2015 1320 695 - 1620 mg/dL Final   ,  ,  ,  ,  ,   Scleroderma Antibody Scl-70 CECILIA IgG   Date Value Ref Range Status   07/24/2015  0.0 - 0.9 AI Final    <0.2  Negative   Antibody index (AI) values reflect qualitative changes in antibody   concentration that cannot be directly associated with clinical condition or   disease state.       Component      Latest Ref Rng & Units 12/4/2020   WBC      4.0 - 11.0 10e9/L 10.3   RBC Count      3.8 - 5.2 10e12/L 4.38   Hemoglobin      11.7 - 15.7 g/dL 13.2   Hematocrit      35.0 - 47.0 % 41.2   MCV      78 - 100 fl 94   MCH      26.5 - 33.0 pg 30.1   MCHC      31.5 - 36.5 g/dL 32.0   RDW      10.0 - 15.0 % 15.0   Platelet Count      150 - 450 10e9/L 189   % Neutrophils      % 72.8   % Lymphocytes      % 14.4   % Monocytes      % 10.6   % Eosinophils      % 1.7   % Basophils      % 0.5   Absolute Neutrophil      1.6 - 8.3 10e9/L 7.5   Absolute Lymphocytes      0.8 - 5.3 10e9/L 1.5   Absolute Monocytes      0.0 - 1.3 10e9/L 1.1   Absolute Eosinophils      0.0 - 0.7 10e9/L 0.2   Absolute Basophils      0.0 - 0.2 10e9/L 0.1   Diff Method       Automated Method   Sodium      133 - 144 mmol/L 138   Potassium      3.4 - 5.3 mmol/L 3.6   Chloride      94 - 109 mmol/L 105   Carbon Dioxide      20 - 32 mmol/L 27   Anion Gap      3 - 14 mmol/L 6   Glucose      70 - 99 mg/dL 176 (H)   Urea Nitrogen      7 - 30 mg/dL 16   Creatinine      0.52 - 1.04 mg/dL 1.49 (H)   GFR Estimate      >60  mL/min/1.73:m2 33 (L)   GFR Estimate If Black      >60 mL/min/1.73:m2 38 (L)   Calcium      8.5 - 10.1 mg/dL 9.4   Bilirubin Total      0.2 - 1.3 mg/dL 0.6   Albumin      3.4 - 5.0 g/dL 3.1 (L)   Protein Total      6.8 - 8.8 g/dL 7.3   Alkaline Phosphatase      40 - 150 U/L 95   ALT      0 - 50 U/L 20   AST      0 - 45 U/L 18   Sed Rate      0 - 30 mm/h 15   CRP Inflammation      0.0 - 8.0 mg/L 15.0 (H)

## 2021-02-06 RX ORDER — HYDROCODONE BITARTRATE AND ACETAMINOPHEN 7.5; 325 MG/1; MG/1
1 TABLET ORAL EVERY 12 HOURS
Qty: 60 TABLET | Refills: 0 | Status: SHIPPED | OUTPATIENT
Start: 2021-02-06 | End: 2021-04-22

## 2021-02-06 RX ORDER — PREDNISONE 5 MG/1
5 TABLET ORAL DAILY
Qty: 30 TABLET | Refills: 0
Start: 2021-02-06 | End: 2022-08-16

## 2021-02-06 RX ORDER — ANAKINRA 100 MG/.67ML
INJECTION, SOLUTION SUBCUTANEOUS
Qty: 2.68 ML | Refills: 3 | OUTPATIENT
Start: 2021-02-06 | End: 2021-02-13

## 2021-02-06 NOTE — PATIENT INSTRUCTIONS
Prednisone 40 mg a day x 3 days (Sat, Sun, Mon) then drop down to 5 mg a day    Norco as needed for pain, was refilled    Cevimeline for dry mouth could be taken 3 times a day    Plan is to get insurance approval for anakinra 100 mg a day x 3 days as needed for gout flares    Labs this month when you are not flaring with gout        Return in 4-5 months

## 2021-02-08 NOTE — TELEPHONE ENCOUNTER
Discussed further with Dr Rosa and Pain clinic.      Date: 2/8/2021    Time of Call: 12:06 PM     Diagnosis:  Heart failure/AFib     [ VORB ] Ordering provider: Dr Rosa    Order: Hold Xeralto 3 days prior to procedure and restart 24 hours after procedure     Order received by: Flor Velasquez RN       Follow-up/additional notes: fan Agrawal who read back instructions.

## 2021-02-08 NOTE — TELEPHONE ENCOUNTER
Received a message from Nurse Ray who works with Dr Rosa.  They will reach out to Nghia  about the Lovenox that Sister Betty will be on when she is holding the Xeralto.  Called Nghia. Lmom Asked her to call us back once she has spoken to Nurse Ray so we know everyone is on the same page.      Susan Marin RN  Care Coordinator  [facility]

## 2021-02-08 NOTE — TELEPHONE ENCOUNTER
Nghia calling back.  She received a call from Nurse Ray at Dr Rosa's office. They are not going to bridge with Lovenox.  Pt will take her last dose of Xarelto on 02/20/2021 and will restart it on 02/25/2021.  Procedure is scheduled for 02/24/2021.    Susan Marin RN  Care Coordinator  [facility]

## 2021-02-09 ENCOUNTER — TELEPHONE (OUTPATIENT)
Dept: RHEUMATOLOGY | Facility: CLINIC | Age: 81
End: 2021-02-09

## 2021-02-09 NOTE — TELEPHONE ENCOUNTER
PA Initiation    Medication: KINERET  Insurance Company: [employer] Part D - Phone [phone] Fax [phone]  Pharmacy Filling the Rx: [facility] - [place], [place] - [facility] [address]  Filling Pharmacy Phone:    Filling Pharmacy Fax:    Start Date: 2/9/2021    JEAN KENNEDY (Key: [id number])

## 2021-02-09 NOTE — TELEPHONE ENCOUNTER
Prior Authorization Approval    Authorization Effective Date: 2/9/2021  Authorization Expiration Date: 2/9/2022  Medication: KINERET - APPROVED   Approved Dose/Quantity:  10 FOR 28 DAYS   Reference #:     Insurance Company: [employer] Part D - Phone [phone] Fax [phone]  Expected CoPay: $424     CoPay Card Available:      Foundation Assistance Needed:    Which Pharmacy is filling the prescription (Not needed for infusion/clinic administered): [facility] - [place], [place] - [facility] [address]  Pharmacy Notified: No  Patient Notified: Yes - SENT [facility] MESSAGE REGARDING FREE DRUG

## 2021-02-11 DIAGNOSIS — I50.30 (HFPEF) HEART FAILURE WITH PRESERVED EJECTION FRACTION (H): ICD-10-CM

## 2021-02-11 DIAGNOSIS — I48.19 PERSISTENT ATRIAL FIBRILLATION (H): ICD-10-CM

## 2021-02-13 DIAGNOSIS — M10.9 ACUTE GOUT OF LEFT FOOT, UNSPECIFIED CAUSE: ICD-10-CM

## 2021-02-13 DIAGNOSIS — M06.00 SERONEGATIVE RHEUMATOID ARTHRITIS (H): ICD-10-CM

## 2021-02-13 RX ORDER — ANAKINRA 100 MG/.67ML
INJECTION, SOLUTION SUBCUTANEOUS
Qty: 6.7 ML | Refills: 3 | Status: SHIPPED | OUTPATIENT
Start: 2021-02-13 | End: 2022-12-22

## 2021-02-14 RX ORDER — METOPROLOL SUCCINATE 25 MG/1
TABLET, EXTENDED RELEASE ORAL
Qty: 90 TABLET | Refills: 1 | Status: SHIPPED | OUTPATIENT
Start: 2021-02-14 | End: 2021-09-10

## 2021-02-14 NOTE — CONFIDENTIAL NOTE
8/27/2020  [facility] Radha Shafer MD  Advanced Heart Failure and Transplant Cardiology    metoprolol succinate ER (TOPROL-XL) 25 MG 24 hr tablet

## 2021-02-18 ENCOUNTER — TELEPHONE (OUTPATIENT)
Dept: PHARMACY | Facility: CLINIC | Age: 81
End: 2021-02-18

## 2021-02-18 NOTE — TELEPHONE ENCOUNTER
Called pt to follow-up on insulin dose adjustments. No answer, message left.   Sabrina Meek PharmD, JAMES, BCACP  [facility] Pharmacist, [facility]

## 2021-02-18 NOTE — PATIENT INSTRUCTIONS
I put in orders for a pressure change on your bilevel.  This should help with a sense of not enough pressure.  I have asked that [employer] sends me a download after a couple of weeks.  Please let Bam know if the pressures are too much still and they can contact me or send me a download for review earlier.  If things are going well we should check in in the year.    For general sleep health questions:   http://sleepeducation.org    For tips about PAP and COVID-19:  https://www.thoracic.org/patients/patient-resources/resources/covid-19-and-home-pap-therapy.pdf    For general info about COVID-19 including vaccines:  https://Scoutforce.org/covid19        Continue PAP therapy every night, for all hours that you are sleeping (including naps.)  As always, try to get at least 8 hours of sleep or more each day, keep a regular sleep schedule, and avoid sleep deprivation. Avoid alcohol.    Reasons that you might need a change to your pressure therapy would be weight gain or loss, waking having inadvertently removed your PAP overnight, having previously felt refreshed by sleep with CPAP use and now waking un-refreshed, and return of daytime sleepiness. Also, the development of new medical problems  (such as heart failure, stroke, medications such as narcotics) can sometimes affect breathing at night and change your PAP therapy needs.    Please bring PAP with you if you are hospitalized.  If anticipating surgery be sure to discuss with your surgeon that you have sleep apnea and use PAP therapy.      Maintain your equipment as recommended which includes routine cleaning and replacement of supplies.      Call DME for any questions regarding supplies or maintenance.    [facility], [facility] [phone]      Do not drive on engage in potentially dangerous activities if feeling sleepy.    Please follow up in sleep clinic again in 12 months.        Tips for your PAP use-    Mask  fitting tips  Mask fitting exercise:    To improve your mask seal and your mobility at night, put mask on and secure in place.  Lie down in bed with full pressure and roll to one side, adjust headgear while in that position to eliminate any leaks. Repeat process rolling to other side.     The mask seal does not have to be perfect:   CPAP machines are designed to make up for small leaks. However, you will not tolerate leaks blowing in your eyes so you will need to adjust.   Any leak should only be near or at the bottom of the mask.  We expect your mask to leak slightly at night.    Do not over-tighten the headgear straps, tighter IS NOT better, we expect minimal leak.    First try re-positioning the mask or headgear before tightening the headgear straps.  Mask leaks are expected due to changing sleeping positions. Try pulling the mask away from your skin allowing the cushion to re-inflate will minimize the leak.  If you struggle for a good fit, try turning the CPAP off and then readjust the mask by pulling it away from your face and then turning back on the CPAP.        Humidifier tips  Humidifiers can be adjusted to increase or decrease the amount of moisture according to your comfort level. You may need to adjust this frequently at first, but then might only change it with seasonal weather changes.     Try INCREASING the humidity if:  You experience a dry, irritated nasal passage or throat.  You have a runny, drippy nose or sneezing fits after using CPAP.  You experience nasal congestion during or after CPAP use.    Try DECREASING the humidity if:  You have excessive condensation or  rain out  in the tubing or mask.  Otherwise keep the tubing warm during the night by running it underneath the blankets or pillow.      Clinic visit after initial PAP set-up   Bring your equipment with you to your 5-8 week follow up clinic visit.  We will be extracting your data from the machine if not available from the cloud based  [facility].        Travel  Always take your equipment with you when you travel.  If you fly with your equipment bring it on with you as a carry on.  Medical equipment does not count as a carry on.    If you travel international the machines take 110-240v.  The only adapter needed is the adapter that will fit into the receptacle (outlet).    You may also want to bring an extension cord as many [facility] rooms have limited outlets at the bedside.  Do not travel with water in your humidifier chamber.     Cleaning and Maintenance Guidelines    Equipment Frequency Cleaning Method   Mask First Day    Daily      Weekly Soak mask in hot soapy water for 30 minutes, rinse and air dry.  Wipe nasal cushion with a hot soapy (Ivory, baby shampoo) cloth and rinse.  Baby wipes may also be used.  Do not use anti-bacterial soaps,Shayla  liquid soap, rubbing alcohol, bleach or ammonia.  Wash frame in hot soapy water (Ivory, baby shampoo) rinse and let air dry   Headgear Biweekly Wash in hot soapy water, rinse and air dry   Reusable Gray Filter Weekly Wash in hot soapy water, rinse, put in towel squeeze moisture out, let air dry   Disposable White Filter Check Weekly Replace when brown or gray in color; at least every 2 to 3 months   Humidifier Chamber Daily    Weekly Empty distilled water from humidifier and let air dry    Hand wash in hot soapy water, rinse and air dry   Tubing Weekly Wash in hot soapy water, rinse and let air dry   Mask, Tubing and Humidifier Chamber As needed Disinfect: Soak in 1 part distilled white vinegar to 3 parts hot water for 30 minutes, rinse well and air dry  Not the material headgear        MASK AND SUPPLY REORDERING and EQUIPMENT NEEDS through your DME and per your insurance  Reminder: Most insurance companies will allow for a new mask, headgear, tubing, and reusable gray filter every six months.  Disposable white ultra-fine filters are covered monthly.      HOME AND SAFETY INSTRUCTIONS    Do not use frayed or  cracked electrical cords, multi plug adaptors, or switched receptacles    Do not immerse electrical equipment into water    Assure that electrical cords do not become a tripping hazard    Your BMI is There is no height or weight on file to calculate BMI.  Weight management is a personal decision.  If you are interested in exploring weight loss strategies, the following discussion covers the approaches that may be successful. Body mass index (BMI) is one way to tell whether you are at a healthy weight, overweight, or obese. It measures your weight in relation to your height.  A BMI of 18.5 to 24.9 is in the healthy range. A person with a BMI of 25 to 29.9 is considered overweight, and someone with a BMI of 30 or greater is considered obese. More than two-thirds of American adults are considered overweight or obese.  Being overweight or obese increases the risk for further weight gain. Excess weight may lead to heart disease and diabetes.  Creating and following plans for healthy eating and physical activity may help you improve your health.  Weight control is part of healthy lifestyle and includes exercise, emotional health, and healthy eating habits. Careful eating habits lifelong are the mainstay of weight control. Though there are significant health benefits from weight loss, long-term weight loss with diet alone may be very difficult to achieve- studies show long-term success with dietary management in less than 10% of people. Attaining a healthy weight may be especially difficult to achieve in those with severe obesity. In some cases, medications, devices and surgical management might be considered.  What can you do?  If you are overweight or obese and are interested in methods for weight loss, you should discuss this with your provider.     Consider reducing daily calorie intake by 500 calories.     Keep a food journal.     Avoiding skipping meals, consider cutting portions instead.    Diet combined with  exercise helps maintain muscle while optimizing fat loss. Strength training is particularly important for building and maintaining muscle mass. Exercise helps reduce stress, increase energy, and improves fitness. Increasing exercise without diet control, however, may not burn enough calories to loose weight.       Start walking three days a week 10-20 minutes at a time    Work towards walking thirty minutes five days a week     Eventually, increase the speed of your walking for 1-2 minutes at time    In addition, we recommend that you review healthy lifestyles and methods for weight loss available through the National Institutes of Health patient information sites:  http://win.niddk.nih.gov/publications/index.htm    And look into health and wellness programs that may be available through your health insurance provider, employer, local community center, or pam club.    Weight management plan: Patient was referred to their PCP to discuss a diet and exercise plan.

## 2021-02-18 NOTE — PROGRESS NOTES
Sister Betty is a 80 year old who is being evaluated via a billable telephone visit.      What phone number would you like to be contacted at? [phone]  How would you like to obtain your AVS? Mail a copy     Phone call duration: 18 minutes     Chief complaint: Excessive pressures through PAP    History of Present Illness: 80-year-old female with a history of interstitial lung disease, atrial fibrillation, hypertension, restlessness, pain in, obstructive sleep apnea associated with hypoventilation.  She has been on bilevel therapy for a few years.  She reports that the pressures have become intolerable.  She actually has  not used it for the last couple of nights and felt that she slept fine without it.  She is wondering if I can change her settings to decrease the pressure.  She would like to avoid going to the sleep lab if possible.      Lung disease has remained stable and she has not had a significant deterioration in her lung function and her exercise tolerance.  She states that sleep is impacted by her sciatica pain.  She is on gabapentin for pain.  She thinks that may be helping with motor restlessness that she is not particularly bothered by restless legs at this time.    PSG performed in 2016 showed mild sleep apnea without hypoxemia however transcutaneous CO2 monitoring supported hypoventilation.  During the second portion of the night CPAP was titrated to a pressure of 9 and that resolved the apnea however not the hypoventilation.  She underwent a second titration study with bilevel and final settings were 24/14 which is what she is currently on.  She has dropped some weight since her sleep studies.    ESS 3  (Less than 10 normal)    SINAI Total Score: 3  (normal 0-7, mild 8-14, moderate 15-21, severe 22-28)    Past Medical History:   Diagnosis Date     Alcohol abuse, in remission      Allergic rhinitis, cause unspecified     allegra helps when she takes it     Antiplatelet or antithrombotic long-term  use      Atrial fibrillation (H)     in hosp in 11/11 after surgery w/ fluid overload     Cardiomegaly     LVH on stress echo- cardiac w/u at [facility]- neg CT scan for PE, neg stress echo in 8/06     Chest pain, unspecified      Disorder of bone and cartilage, unspecified     osteopenia (had been on prempro), improved on 6/06 dexa, stable dexa 11/10     Diverticulosis of colon (without mention of hemorrhage)     last episode yrs ago     Essential hypertension, benign      Follicular bronchiolitis (H)     associated with Sjogrens, dx by chest CT showing mosaic attenuation and air trapping     Gastro-oesophageal reflux disease      ILD (interstitial lung disease) (H)     associated with Sjogrens, also has mildly elevated IgG4, first noted on chest CT 2015 (mild changes) and also has small airways disease; ILD improved on follow up chest CT 2018.     Insomnia, unspecified     weaned off clonazepam     Irregular heart beat      Lumbago 7/09    MRI with DJD, now seeing Dr. Cain for sciatic sx's     Major depressive disorder, recurrent episode, moderate (H)      Obstructive sleep apnea     uses cpap     Osteoarthrosis, unspecified whether generalized or localized, unspecified site      Sjogren's syndrome (H)     + RG and SSA and lip bx     Sleep apnea      Tobacco use disorder     chantix in 9/07, started again in 6/08, working       Allergies   Allergen Reactions     Amoxicillin-Pot Clavulanate      Augmentin Nausea and Vomiting     Codeine Nausea and Vomiting     Codeine      PN: LW Reaction: HIVES     Penicillins Nausea and Vomiting     PN: LW Reaction: GI Upset     Phenobarbital Itching     Phenobarbital      Seasonal Allergies        Current Outpatient Medications   Medication     acetaminophen (TYLENOL) 500 MG tablet     acyclovir (ZOVIRAX) 400 MG tablet     acyclovir (ZOVIRAX) 5 % external ointment     albuterol (PROAIR HFA, PROVENTIL HFA, VENTOLIN HFA) 108 (90 BASE) MCG/ACT inhaler     alendronate (FOSAMAX) 70  MG tablet     anakinra (KINERET) 100 MG/0.67ML SOSY injection     atorvastatin (LIPITOR) 10 MG tablet     azithromycin (ZITHROMAX) 250 MG tablet     CEVIMELINE 30 MG PO capsule     escitalopram (LEXAPRO) 20 MG tablet     fluticasone (FLONASE) 50 MCG/ACT nasal spray     fluticasone-vilanterol (BREO ELLIPTA) 100-25 MCG/INH inhaler     gabapentin (NEURONTIN) 300 MG capsule     HYDROcodone-acetaminophen (NORCO) 7.5-325 MG per tablet     hydroxychloroquine (PLAQUENIL) 200 MG tablet     insulin aspart (NOVOLOG FLEXPEN) 100 UNIT/ML pen     insulin glargine (BASAGLAR KWIKPEN) 100 UNIT/ML pen     lidocaine (LIDODERM) 5 % patch     loratadine (CLARITIN) 10 MG tablet     metoprolol succinate ER (TOPROL-XL) 25 MG 24 hr tablet     metoprolol succinate ER (TOPROL-XL) 50 MG 24 hr tablet     montelukast (SINGULAIR) 10 MG tablet     potassium chloride ER (KLOR-CON) 20 MEQ CR tablet     predniSONE (DELTASONE) 5 MG tablet     rivaroxaban ANTICOAGULANT (XARELTO ANTICOAGULANT) 20 MG TABS tablet     semaglutide (OZEMPIC, 1 MG/DOSE,) 2 MG/1.5ML pen     spironolactone (ALDACTONE) 25 MG tablet     torsemide (DEMADEX) 10 MG tablet     torsemide (DEMADEX) 20 MG tablet     traZODone (DESYREL) 50 MG tablet     vitamin D3 (CHOLECALCIFEROL) 50 mcg (2000 units) tablet     No current facility-administered medications for this visit.        Social History     Socioeconomic History     Marital status: Single     Spouse name: Not on file     Number of children: 0     Years of education: Ed Spec De     Highest education level: Not on file   Occupational History     Occupation: Professor     Employer: [employer]     Comment: [employer]   Social Needs     Financial resource strain: Not on file     Food insecurity     Worry: Not on file     Inability: Not on file     Transportation needs     Medical: Not on file     Non-medical: Not on file   Tobacco Use     Smoking status: Former Smoker     Packs/day: 0.50      Years: 10.00     Pack years: 5.00     Types: Cigarettes     Quit date: 2011     Years since quittin.5     Smokeless tobacco: Never Used     Tobacco comment:  ppd   Substance and Sexual Activity     Alcohol use: No     Alcohol/week: 0.0 standard drinks     Comment: In recovery beginning      Drug use: No     Sexual activity: Never   Lifestyle     Physical activity     Days per week: Not on file     Minutes per session: Not on file     Stress: Not on file   Relationships     Social connections     Talks on phone: Not on file     Gets together: Not on file     Attends [employer] service: Not on file     Active member of club or organization: Not on file     Attends meetings of clubs or organizations: Not on file     Relationship status: Not on file     Intimate partner violence     Fear of current or ex partner: Not on file     Emotionally abused: Not on file     Physically abused: Not on file     Forced sexual activity: Not on file   Other Topics Concern     Parent/sibling w/ CABG, MI or angioplasty before 65F 55M? Not Asked   Social History Narrative                   Family History   Problem Relation Age of Onset     C.A.D. Mother 63        MI- first at age 63     Heart Disease Mother      Hypertension Mother      Cerebrovascular Disease Mother      Hyperlipidemia Mother      Alcohol/Drug Father      Alzheimer Disease Father      Dementia Father      Hypertension Father      Hyperlipidemia Father      Diabetes Sister      C.A.D. Sister 52        Minor MI- age 50's     Heart Disease Sister      Hypertension Sister      Hypertension Sister      Hypertension Brother      Cancer - colorectal Sister 48        Late 40's early 50's     Prostate Cancer Brother 74        Dx'd age 74     Gastrointestinal Disease Sister         Diverticulitis     Gastrointestinal Disease Brother         Diverticulitis     Lipids Sister      Lipids Sister      Parkinsonism Brother      Diabetes Sister      Heart Disease Sister          CHF     Cancer Sister         lung, smoker     Substance Abuse Sister      Substance Abuse Brother      Asthma Sister      Cancer Sister      Breast Cancer Daughter      Prostate Cancer Brother      Hyperlipidemia Brother      Diabetes Other      Hypertension Other          EXAM:  There were no vitals taken for this visit.  GENERAL: Alert and no distress  RESP: No audible wheeze, cough, able to speak in complete sentences  PSYCH: Mentation appears normal, affect normal/bright, judgement and insight intact, normal speech      PSG split [facility] 2/1/2016  Weight 215 lbs BMI 35.8  AHI 11.4, RDI 19.2, Lowest O2 SAT 84%  TCM CO2 peak 57   PLM I 153  CPAP 9 without REM supine    Titration PSG 2/4/2016  Titrated to bilevel 24/14      ResMed V curve auto bilevel PAP download from 1/19/2021 to 2/17/2021 reviewed:  Per cent of days used greater than 4 Hours 90% (minimum goal greater than 70%)  Average use on days used: 7 hours 18 min  Settings:  EPAP 14 cmH2O   IPAP 24 cmH2O  Average AHI 1.1 events per hour (goal less than 5)  Leak excessive    ASSESSMENT:  80-year-old female with history of restless sleep, chronic pain, interstitial lung disease, history of mild sleep apnea with hypoventilation but without hypoxemia.  High bilevel pressures becoming intolerable.  It is very possible that she would get adequate treatment of sleep disordered breathing at lower pressure settings.  She may not need optimal resolution of hypoventilation given that there had been no significant associated hypoxemia.    PLAN:  Recommended putting her device into auto bilevel mode with a EPAP min of 9 and a pressure support of 10 as her previous device spread was 10.  Would ask that her DME send me a download within 2 weeks of the pressure change.  If the device pressures continue to deliver IPAP of 24 and EPAP of 14 may need to limit that further.  Consider in lab PSG with titration if needed.  Patient should continue gabapentin as it may be  affecting and improving her motor restlessness.  If she does well with these pressure setting changes follow-up in 1 year.    30 minutes spent on the date of the encounter doing chart review, history and exam, documentation and further activities as noted above    Argelia Ott M.D.  Pulmonary/Critical Care/Sleep Medicine    [facility]   Fl[address], [address]   [address] [place], [place] [address]. [place] [address]   Appointments: [phone]    The above note was dictated using voice recognition software and may include typographical errors. Please contact the author for any clarifications.

## 2021-02-19 ENCOUNTER — VIRTUAL VISIT (OUTPATIENT)
Dept: SLEEP MEDICINE | Facility: CLINIC | Age: 81
End: 2021-02-19
Payer: MEDICARE

## 2021-02-19 DIAGNOSIS — G47.33 OSA (OBSTRUCTIVE SLEEP APNEA): Primary | ICD-10-CM

## 2021-02-19 PROCEDURE — 99443 PR PHYSICIAN TELEPHONE EVALUATION 21-30 MIN: CPT | Mod: 95 | Performed by: INTERNAL MEDICINE

## 2021-02-23 ENCOUNTER — TELEPHONE (OUTPATIENT)
Dept: PALLIATIVE MEDICINE | Facility: CLINIC | Age: 81
End: 2021-02-23

## 2021-02-23 DIAGNOSIS — J44.89 FOLLICULAR BRONCHIOLITIS (H): ICD-10-CM

## 2021-02-23 RX ORDER — AZITHROMYCIN 250 MG/1
TABLET, FILM COATED ORAL
Qty: 30 TABLET | Refills: 1 | Status: SHIPPED | OUTPATIENT
Start: 2021-02-23 | End: 2021-04-21

## 2021-02-23 NOTE — TELEPHONE ENCOUNTER
Spoke to patient, reminded patient of date, time and location of appointment.      Reminded patient to eat and drink as usual, hold any blood thinners or pain medications that they were asked to hold per nurse.     Reminded patient they will need a  for this appointment.      Reminded patient that both patient and  must wear a mask during their visit.        Zayra Christy MA  [facility]

## 2021-02-24 ENCOUNTER — RADIOLOGY INJECTION OFFICE VISIT (OUTPATIENT)
Dept: PALLIATIVE MEDICINE | Facility: CLINIC | Age: 81
End: 2021-02-24
Payer: MEDICARE

## 2021-02-24 VITALS — DIASTOLIC BLOOD PRESSURE: 60 MMHG | HEART RATE: 61 BPM | OXYGEN SATURATION: 95 % | SYSTOLIC BLOOD PRESSURE: 129 MMHG

## 2021-02-24 DIAGNOSIS — M54.16 LUMBAR RADICULOPATHY: Primary | ICD-10-CM

## 2021-02-24 PROCEDURE — 64483 NJX AA&/STRD TFRM EPI L/S 1: CPT | Mod: RT | Performed by: PHYSICAL MEDICINE & REHABILITATION

## 2021-02-24 NOTE — NURSING NOTE
Discharge Information    IV Discontiued Time:  NA    Amount of Fluid Infused:  NA    Discharge Criteria = When patient returns to baseline or as per MD order    Consciousness:  Pt is fully awake    Circulation:  BP +/- 20% of pre-procedure level    Respiration:  Patient is able to breathe deeply    O2 Sat:  Patient is able to maintain O2 Sat >92% on room air    Activity:  Moves 4 extremities on command    Ambulation:  Patient is able to stand and walk or stand and pivot into wheelchair    Dressing:  Clean/dry or No Dressing    Notes:   Discharge instructions and AVS given to patient    Patient meets criteria for discharge?  YES    Admitted to PCU?  No    Responsible adult present to accompany patient home?  Yes    Signature/Title:    Susan Marin RN Care Coordinator  [facility] [place]

## 2021-02-24 NOTE — NURSING NOTE
Pre-procedure Intake    Have you been fasting? No    If yes, for how long?     Are you taking a prescribed blood thinner such as coumadin, Plavix, Xarelto?    Yes - Xarelto     If yes, when did you take your last dose? Saturday-2/20/21    Do you take aspirin?   NO    If cervical procedure, have you held aspirin for 6 days?   NA    Do you have any allergies to contrast dye, iodine, steroid and/or numbing medications?  NO    Are you currently taking antibiotics or have an active infection?  NO    Have you had a fever/elevated temperature within the past week? NO    Are you currently taking oral steroids? NO    Do you have a ? Yes       Are you pregnant or breastfeeding?  NO    Are the vital signs normal?  Yes

## 2021-02-24 NOTE — PATIENT INSTRUCTIONS
[facility] Procedure Discharge Instructions    Today you saw:      Dr. Charly Moore    Your procedure:  Epidural steroid injection       Medications used:  Lidocaine (anesthetic)    Dexamethasone (steroid)       Omnipaque (contrast)        You were holding your Xarelto , please restart taking it: 24 hours after the procedure          Be cautious when walking as numbness and/or weakness in the legs may occur up to 6-8 hours after the procedure due to effect of the local anesthetic    Do not drive for 6 hours. The effect of the local anesthetic could slow your reflexes.     Avoid strenuous activity for the first 24 hours. You may resume your regular activities after that.     You may shower, however avoid swimming, tub baths or hot tubs for 24 hours following your procedure    You may have a mild to moderate increase in pain for several days following the injection.      You may use ice packs for 10-15 minutes, 3 to 4 times a day at the injection site for comfort    Do not use heat to painful areas for 6 to 8 hours. This will give the local anesthetic time to wear off and prevent you from accidentally burning your skin.    Unless you have been directed to avoid the use of anti-inflammatory medications (NSAIDS-ibuprofen, Aleve, Motrin), you may use these medications or Tylenol for pain control if needed.     With diabetes, check your blood sugar more frequently than usual as your blood sugar may be higher than normal for 10-14 days following a steroid injection. Contact your doctor who manages your diabetes if your blood sugar is higher than usual    Possible side effects of steroids that you may experience include flushing, elevated blood pressure, increased appetite, mild headaches and restlessness.  All of these symptoms will get better with time.    It may take up to 14 days for the steroid medication to start working although you may feel the effect as early as a few days after the  procedure.     Follow up with your referring provider in 2-3 weeks      If you experience any of the following, call the pain center line during work hours at [phone] or on-call physician after hours at [phone]:  -Fever over 100 degree F  -Swelling, bleeding, redness, drainage, warmth at the injection site  -Progressive weakness or numbness in your legs   -Loss of bowel or bladder function  -Unusual headache that is not relieved by Tylenol or your regular headache medication  -Unusual new onset of pain that is not improving

## 2021-02-24 NOTE — PROGRESS NOTES
[facility] - Procedure Note    Date of Service: 2/24/2021    Procedure performed: Right S1 transforaminal epidural steroid injection with fluoroscopic guidance  Diagnosis: Lumbar spondylosis; Lumbar radiculitis/radiculopathy  : Charly Moore DO   Anesthesia: none    Indications: Betty Tee is a 80 year old female who is seen for a lumbar transforaminal epidural steroid injection. The patient describes chronic right sided back pain with pain radiating down the back of the right leg to the foot. The patient has been exhibiting symptoms consistent with lumbar intraspinal inflammation and radiculopathy. Symptoms have been persistent, disabling, and intermittently severe. The patient reports minimal improvement with conservative treatment, including oral medications and exercises.    Lumbar MRI was done on 2/8/20 which showed   Impression:  1. Degenerative spondylosis as described above. Most notably, there is  grade 1 anterolisthesis/asymmetric calcified disc bulge at L5-S1 with  mild effacement of the right lateral recess and abutment of the right  descending S1 nerve root.  2. There is also mild foraminal narrowing at multiple levels described  above.  3. Suspected chronic compression deformity at T7 and T4, based on  limited  images.       Allergies:      Allergies   Allergen Reactions     Amoxicillin-Pot Clavulanate      Augmentin Nausea and Vomiting     Codeine Nausea and Vomiting     Codeine      PN: LW Reaction: HIVES     Penicillins Nausea and Vomiting     PN: LW Reaction: GI Upset     Phenobarbital Itching     Phenobarbital      Seasonal Allergies         Vitals:  /60 (BP Location: Left arm, Cuff Size: Adult Large)   Pulse 61   SpO2 95%     Review of Systems: The patient denies recent fever, chills, illness, use of antibiotics or anticoagulants. All other 10-point review of systems negative.     Procedure: The procedure and risks were explained, and  informed written consent was obtained from the patient. Risks include but are not limited to: infection, bleeding, increased pain, and damage to soft tissue, nerve, muscle, and vasculature structures. After getting informed consent, patient was brought into the procedure suite and was placed in a prone position on the procedure table. A Pause for the Cause was performed. Patient was prepped and draped in sterile fashion.     After identifying the right S1 neuroforamen a total of 4.5 mL of Lidocaine 1% was used to anesthetize the skin and the needle track at a skin entry site coaxial with the fluoroscopy beam, and overriding the superior aspect of the neuroforamen.  A 22 gauge 5 inch spinal needle was advanced under intermittent fluoroscopy until it entered the foramen superiorly.    The position was then inspected from anteroposterior and lateral views, and the needle adjusted appropriately.  After negative aspiration, a total of 1 mL of Omnipaque-300 was injected using static and continuous fluoroscopy confirming appropriate position, with spread along the nerve root sheath and into the epidural space, with no intravascular or intrathecal uptake. 9 mL of Omnipaque-300 was wasted.    1mL of 1% lidocaine with 10 mg of dexamethasone was injected.  The needle was removed. Hemostasis was achieved, the area was cleaned, and bandaids were placed when appropriate. Images were saved to PACS.    The patient tolerated the procedure well, and was taken to the recovery room, and there was no evidence of procedural complications. No new sensory or motor deficits were noted following the procedure. The patient was stable and able to ambulate on discharge home. Post-procedure instructions were provided.     Pre-procedure pain score: 0/10 in the back, 6/10 in the leg  Post-procedure pain score: 0/10 in the back, 6/10 in the leg    Assessment/Plan: Betty Tee is a 80 year old female s/p right S1 transforaminal epidural  steroid injection today for lumbar spondylosis, radiculitis/radiculopathy.     1. Following today's procedure, the patient was advised to contact the [facility] for any of the following:   Fever, chills, or night sweats   New onset of pain, numbness, or weakness   Any questions/concerns regarding the procedure  If unable to contact the Pain Center, the patient was instructed to go to a local Emergency Room for any complications.   2. The patient will receive a follow-up call in 1 week.  3. The patient should follow-up with the referring provider in 2 weeks for post-procedure evaluation.        Charly Moore DO  [facility]

## 2021-03-03 ENCOUNTER — OFFICE VISIT (OUTPATIENT)
Dept: FAMILY MEDICINE | Facility: CLINIC | Age: 81
End: 2021-03-03
Payer: MEDICARE

## 2021-03-03 VITALS
HEART RATE: 78 BPM | TEMPERATURE: 97.7 F | RESPIRATION RATE: 14 BRPM | HEIGHT: 67 IN | WEIGHT: 204 LBS | OXYGEN SATURATION: 98 % | SYSTOLIC BLOOD PRESSURE: 136 MMHG | BODY MASS INDEX: 32.02 KG/M2 | DIASTOLIC BLOOD PRESSURE: 74 MMHG

## 2021-03-03 DIAGNOSIS — N39.41 URGE INCONTINENCE OF URINE: ICD-10-CM

## 2021-03-03 DIAGNOSIS — M54.16 LUMBAR RADICULOPATHY: ICD-10-CM

## 2021-03-03 DIAGNOSIS — E11.65 TYPE 2 DIABETES MELLITUS WITH HYPERGLYCEMIA, WITH LONG-TERM CURRENT USE OF INSULIN (H): Primary | ICD-10-CM

## 2021-03-03 DIAGNOSIS — N18.9 CHRONIC KIDNEY DISEASE, UNSPECIFIED CKD STAGE: ICD-10-CM

## 2021-03-03 DIAGNOSIS — M10.9 ACUTE GOUT OF LEFT FOOT, UNSPECIFIED CAUSE: ICD-10-CM

## 2021-03-03 DIAGNOSIS — G47.33 OSA (OBSTRUCTIVE SLEEP APNEA): ICD-10-CM

## 2021-03-03 DIAGNOSIS — I10 ESSENTIAL HYPERTENSION WITH GOAL BLOOD PRESSURE LESS THAN 140/90: ICD-10-CM

## 2021-03-03 DIAGNOSIS — R15.2 FECAL URGENCY: ICD-10-CM

## 2021-03-03 DIAGNOSIS — M06.00 SERONEGATIVE RHEUMATOID ARTHRITIS (H): ICD-10-CM

## 2021-03-03 DIAGNOSIS — M19.90 INFLAMMATORY ARTHRITIS: ICD-10-CM

## 2021-03-03 DIAGNOSIS — M16.11 OSTEOARTHRITIS OF RIGHT HIP, UNSPECIFIED OSTEOARTHRITIS TYPE: ICD-10-CM

## 2021-03-03 DIAGNOSIS — G89.29 OTHER CHRONIC PAIN: ICD-10-CM

## 2021-03-03 DIAGNOSIS — Z79.4 TYPE 2 DIABETES MELLITUS WITH HYPERGLYCEMIA, WITH LONG-TERM CURRENT USE OF INSULIN (H): Primary | ICD-10-CM

## 2021-03-03 LAB
ALBUMIN UR-MCNC: 100 MG/DL
APPEARANCE UR: CLEAR
BILIRUB UR QL STRIP: NEGATIVE
COLOR UR AUTO: YELLOW
CRP SERPL-MCNC: 45 MG/L (ref 0–8)
ERYTHROCYTE [SEDIMENTATION RATE] IN BLOOD BY WESTERGREN METHOD: 23 MM/H (ref 0–30)
GLUCOSE UR STRIP-MCNC: 500 MG/DL
HBA1C MFR BLD: 8 % (ref 0–5.6)
HGB UR QL STRIP: ABNORMAL
KETONES UR STRIP-MCNC: ABNORMAL MG/DL
LEUKOCYTE ESTERASE UR QL STRIP: ABNORMAL
NITRATE UR QL: NEGATIVE
PH UR STRIP: 5.5 PH (ref 5–7)
RBC #/AREA URNS AUTO: >100 /HPF
SOURCE: ABNORMAL
SP GR UR STRIP: 1.02 (ref 1–1.03)
UROBILINOGEN UR STRIP-ACNC: 1 EU/DL (ref 0.2–1)
WBC #/AREA URNS AUTO: ABNORMAL /HPF

## 2021-03-03 PROCEDURE — 83036 HEMOGLOBIN GLYCOSYLATED A1C: CPT | Performed by: INTERNAL MEDICINE

## 2021-03-03 PROCEDURE — 86481 TB AG RESPONSE T-CELL SUSP: CPT | Performed by: INTERNAL MEDICINE

## 2021-03-03 PROCEDURE — 84550 ASSAY OF BLOOD/URIC ACID: CPT | Performed by: INTERNAL MEDICINE

## 2021-03-03 PROCEDURE — 99215 OFFICE O/P EST HI 40 MIN: CPT | Performed by: FAMILY MEDICINE

## 2021-03-03 PROCEDURE — 80048 BASIC METABOLIC PNL TOTAL CA: CPT | Performed by: INTERNAL MEDICINE

## 2021-03-03 PROCEDURE — 86140 C-REACTIVE PROTEIN: CPT | Performed by: INTERNAL MEDICINE

## 2021-03-03 PROCEDURE — 85652 RBC SED RATE AUTOMATED: CPT | Performed by: INTERNAL MEDICINE

## 2021-03-03 PROCEDURE — 81001 URINALYSIS AUTO W/SCOPE: CPT | Performed by: INTERNAL MEDICINE

## 2021-03-03 PROCEDURE — 36415 COLL VENOUS BLD VENIPUNCTURE: CPT | Performed by: INTERNAL MEDICINE

## 2021-03-03 RX ORDER — FAMOTIDINE 10 MG
20 TABLET ORAL 2 TIMES DAILY
COMMUNITY
End: 2022-03-07 | Stop reason: ALTCHOICE

## 2021-03-03 ASSESSMENT — MIFFLIN-ST. JEOR: SCORE: 1427.97

## 2021-03-03 NOTE — LETTER
March 16, 2021      Bettyange Tee  [address]  [facility] [address]        Dear ,    We are writing to inform you of your test results.    We treated you for UTI.    Resulted Orders   CRP inflammation   Result Value Ref Range    CRP Inflammation 45.0 (H) 0.0 - 8.0 mg/L   Uric acid   Result Value Ref Range    Uric Acid 5.1 2.6 - 6.0 mg/dL   Erythrocyte sedimentation rate auto   Result Value Ref Range    Sed Rate 23 0 - 30 mm/h   Quantiferon TB Gold Plus   Result Value Ref Range    MTB Quantiferon Result Negative NEG^Negative      Comment:      No interferon gamma response to M.tuberculosis antigens was detected.   Infection with M.tuberculosis is unlikely, however a single negative result   does not exclude infection. In patients at high risk for infection, a second   test should be considered      TB1 Ag minus Nil 0.02 IU/mL    TB2 Ag minus Nil 0.30 IU/mL    Mitogen minus Nil 9.99 IU/mL    NIL Result 0.01 IU/mL   UA with Microscopic reflex to Culture   Result Value Ref Range    Color Urine Yellow     Appearance Urine Clear     Glucose Urine 500 (A) NEG^Negative mg/dL    Bilirubin Urine Negative NEG^Negative    Ketones Urine Trace (A) NEG^Negative mg/dL    Specific Gravity Urine 1.020 1.003 - 1.035    pH Urine 5.5 5.0 - 7.0 pH    Protein Albumin Urine 100 (A) NEG^Negative mg/dL    Urobilinogen Urine 1.0 0.2 - 1.0 EU/dL    Nitrite Urine Negative NEG^Negative    Blood Urine Large (A) NEG^Negative    Leukocyte Esterase Urine Trace (A) NEG^Negative    Source Midstream Urine     WBC Urine 0 - 5 OTO5^0 - 5 /HPF    RBC Urine >100 (A) OTO2^O - 2 /HPF       If you have any questions or concerns, please call the clinic at the number listed above.       Sincerely,      Zulma Lopez MD

## 2021-03-03 NOTE — PROGRESS NOTES
Assessment & Plan       ICD-10-CM    1. Type 2 diabetes mellitus with hyperglycemia, with long-term current use of insulin (H)  E11.65 Hemoglobin A1c    Z79.4    2. Essential hypertension with goal blood pressure less than 140/90  I10 Basic metabolic panel  (Ca, Cl, CO2, Creat, Gluc, K, Na, BUN)   3. Chronic kidney disease, unspecified CKD stage  N18.9 UA with Microscopic reflex to Culture     CANCELED: CRP inflammation     CANCELED: Routine UA with Micro Reflex to Culture   4. Lumbar radiculopathy  M54.16    5. ANGELICA (obstructive sleep apnea)  G47.33    6. Acute gout of left foot, unspecified cause  M10.9 CRP inflammation     Uric acid     Erythrocyte sedimentation rate auto     Quantiferon TB Gold Plus   7. Inflammatory arthritis  M19.90 UA with Microscopic reflex to Culture     CANCELED: CRP inflammation     CANCELED: Routine UA with Micro Reflex to Culture   8. Seronegative rheumatoid arthritis (H)  M06.00 CRP inflammation     Uric acid     Erythrocyte sedimentation rate auto     Quantiferon TB Gold Plus   9. Other chronic pain  G89.29 UA with Microscopic reflex to Culture     CANCELED: CRP inflammation     CANCELED: Routine UA with Micro Reflex to Culture   10. Osteoarthritis of right hip, unspecified osteoarthritis type  M16.11 UA with Microscopic reflex to Culture     CANCELED: CRP inflammation     CANCELED: Routine UA with Micro Reflex to Culture   11. Urge incontinence of urine  N39.41    12. Fecal urgency  R15.2       DM II- Last A1C in 8's, she feels this one will be better based on improved glucose levels.  Basaglar at 32 units/day and novolog 11 units 3x/day.  Will recheck A1C today.    HTN/CKD- BP okay today.  Will recheck BMP today.    Lumbar radiculopathy- Betty is glad she went forward with the spinal injection with Dr. Moore on 2/24/21.  She had a very difficult time with injections in the past, but this one went well.  1 week out from injection- unsure if sx's are just a bit better (6/10 to  4/10 pain), or if it's the same yet.  She will cont on same gabapentin dosing for now.  Cont f/u with Dr. Moore.      Gout/inflammatory arthritis- reviewed rheumatology cares/consult notes/plans with patient and Nghia.  Received short-course prednisone increase with recent gout flare, but now has new anakinra injections to use for future gout flares so she can avoid increases in prednisone.  Now back to her prednisone 5mg/day.  Cont f/u with Dr. Lopez.    ANGELICA- reviewed recent sleep consult.  Pt frustrated by the high flow from bilevel, and that it hasn't been lowered yet after consult.  Reviewed notes (and printed out for their review), that Dr. Ott did order levels to be lowered.  Pt will call Bam to see how/when it can be changed.  Looks like f/u should be done 2 wks after pressure change.    Urinary/bowel incontinence issues- may be worsening again, but difficult to get full details today.  Pt last saw Dr. Nassar- urology in 10/19, reviewed consultation recommendations with patient, and that her sx's were noted to be much improved/resolved at their follow-up appointment.  Pt she thinks that the interventions of changing the diuretic to a larger dose in am, lesser dose in pm has helped some.  She also thinks it's helped to get to the bathroom more frequently during the day has helped some.  Pt also notes near rectal incontinence.  Has a BM most days.  She'll eat a 'good meal', and it will go right through her.   Pt not interested in going back to see Dr. Nsasar at this point, and is not interested in seeing colorectal.  She'll consider if sx's are worsening.      Sleep f/u- Pt has been very bothered by high bilevel pressures at night.  She did speak with Dr. Ott, who agreed to lower pressure in bilevel, and sent orders in.  Pt notes that nothing has changed yet- will call [facility]ria and see where the problem is.      Did not go into depth about cardiac or pulmonary f/u today, but she notes that her breathing  has been stable lately.      48 minutes spent on the date of the encounter doing chart review, review of outside records, review of test results, interpretation of tests, patient visit and documentation       Return in about 3 months (around 6/3/2021) for Routine Visit/Chronic Cares/Med Check, Diabetes.    Ilene Tristan MD  [facility]            Subjective   Sister Betty is a 80 year old who presents for the following health issues- multiple issues, accompanied by her friend:    HPI       Diabetes Follow-up  How often are you checking your blood sugar? Three times daily  Blood sugar testing frequency justification:  diabetes  What time of day are you checking your blood sugars (select all that apply)?  morning, noon and night  Have you had any blood sugars above 200?  Yes sometimes  Have you had any blood sugars below 70?  No    What symptoms do you notice when your blood sugar is low?  None    What concerns do you have today about your diabetes? None     Do you have any of these symptoms? (Select all that apply)  No numbness or tingling in feet.  No redness, sores or blisters on feet.  No complaints of excessive thirst.  No reports of blurry vision.  No significant changes to weight.    Have you had a diabetic eye exam in the last 12 months? Yes-  Location: [facility]    UP to 11 units novolog with meals and 32 units of basaglar in morning.    Glucose levels seem better, feels like the a1C will be better.       Updates-  --COVID shots completed ([employer])-- 1/22/21 and 2/12/21.    --Long-standing back and right leg pain.  She had been extremely wary to try another injection, but pt notes 'he was excellent'- Dr. Moore.  He and the fellow- took their time with the epidural injection- put in two different types of dye.    He called to check.  The pain is still there, but the injection was okay.  Pain in R leg, down to her feet.  4/10 pain today, usually 6/10, but feels it's too  soon to tell if the it's improved due to the injection yet.  About a week out from the injection.    Per pt and Nghia, he noted that if the injection is not helpful, they could consider a different injection.  He wanted to leave the gabapentin alone for now.    --Gout flare-   Recent gout flare in left foot- foot/ankle flare.  Treated with prednisone, but future, Dr. Lopez ordered a new shots - for anakinra 100mg a day x 3 days for gout flare in future.  Now going through PA.  Now back to prednisone 5mg/day.    --Bowel/bladder issues-  Just needs to get to the bathroom urgently if she needs to urinate or have a bowel movement.    Wearing a pull-up in case she doesn't make it-   Has to rush out of bed, she had to change everything this am (3am).    Pt last saw Dr. Nassar- urology in 10/19, reviewed consultation recommendations with patient, and that her sx's were noted to be much improved/resolved at their follow-up appointment.  Pt does not think the sx's are that much better, but she thinks that the interventions of changing the diuretic to a larger dose in am, lesser dose in pm has helped some.  She also thinks it's helped to get to the bathroom more frequently during the day has helped some.    Pt also notes near rectal incontinence.  Has a BM most days.  She'll eat a good meal, and it will go right through her.Tolerable now- will send her back if getting worse again.      Sleep f/u- Spoke with Dr. Ott.  Agreed to lower pressure in bilevel.  Sent orders in.  Pt notes that nothings happneed yet- will call osvaldo and see where the problem is.      BP Readings from Last 2 Encounters:   03/03/21 136/74   02/24/21 129/60     Hemoglobin A1C (%)   Date Value   03/03/2021 8.0 (H)   10/20/2020 8.7 (H)     LDL Cholesterol Calculated (mg/dL)   Date Value   10/20/2020 19   01/21/2020 1         How many servings of fruits and vegetables do you eat daily?  2-3    On average, how many sweetened beverages do you drink each  "day (Examples: soda, juice, sweet tea, etc.  Do NOT count diet or artificially sweetened beverages)?   0    How many days per week do you exercise enough to make your heart beat faster? 3 or less    How many minutes a day do you exercise enough to make your heart beat faster? 9 or less    How many days per week do you miss taking your medication? 0        Review of Systems   Constitutional, HEENT, cardiovascular, pulmonary, gi and gu systems are negative, except as otherwise noted.      Objective    /74   Pulse 78   Temp 97.7  F (36.5  C) (Tympanic)   Resp 14   Ht 1.702 m (5' 7\")   Wt 92.5 kg (204 lb)   SpO2 98%   BMI 31.95 kg/m    Body mass index is 31.95 kg/m .  Physical Exam   GENERAL APPEARANCE: healthy, alert and no distress     EYES: PERRL, sclera clear     HENT: nose and mouth without ulcers or lesions     NECK: no adenopathy, no asymmetry, masses, or scars and thyroid normal to palpation     RESP: lungs clear to auscultation - no rales, rhonchi or wheezes     CV: regular rates and rhythm, normal S1 S2, no S3 or S4 and no murmur, click or rub      Abdomen: soft, nontender, no HSM or masses and bowel sounds normal     Ext: warm, dry, trace bilateral LE edema             "

## 2021-03-03 NOTE — LETTER
March 5, 2021      eBtty Tee  [address]  [facility] [address]        Dear ,    We are writing to inform you of your test results.    Lots of protein and blood in the urine with high CRP inflammation, any symptoms of UTI?      Resulted Orders   CRP inflammation   Result Value Ref Range    CRP Inflammation 45.0 (H) 0.0 - 8.0 mg/L   Uric acid   Result Value Ref Range    Uric Acid 5.1 2.6 - 6.0 mg/dL   Erythrocyte sedimentation rate auto   Result Value Ref Range    Sed Rate 23 0 - 30 mm/h   UA with Microscopic reflex to Culture   Result Value Ref Range    Color Urine Yellow     Appearance Urine Clear     Glucose Urine 500 (A) NEG^Negative mg/dL    Bilirubin Urine Negative NEG^Negative    Ketones Urine Trace (A) NEG^Negative mg/dL    Specific Gravity Urine 1.020 1.003 - 1.035    pH Urine 5.5 5.0 - 7.0 pH    Protein Albumin Urine 100 (A) NEG^Negative mg/dL    Urobilinogen Urine 1.0 0.2 - 1.0 EU/dL    Nitrite Urine Negative NEG^Negative    Blood Urine Large (A) NEG^Negative    Leukocyte Esterase Urine Trace (A) NEG^Negative    Source Midstream Urine     WBC Urine 0 - 5 OTO5^0 - 5 /HPF    RBC Urine >100 (A) OTO2^O - 2 /HPF       If you have any questions or concerns, please call the clinic at the number listed above.       Sincerely,      Zulma Lopez MD

## 2021-03-04 LAB
ANION GAP SERPL CALCULATED.3IONS-SCNC: 2 MMOL/L (ref 3–14)
BUN SERPL-MCNC: 16 MG/DL (ref 7–30)
CALCIUM SERPL-MCNC: 9.5 MG/DL (ref 8.5–10.1)
CHLORIDE SERPL-SCNC: 105 MMOL/L (ref 94–109)
CO2 SERPL-SCNC: 31 MMOL/L (ref 20–32)
CREAT SERPL-MCNC: 1.02 MG/DL (ref 0.52–1.04)
GFR SERPL CREATININE-BSD FRML MDRD: 52 ML/MIN/{1.73_M2}
GLUCOSE SERPL-MCNC: 191 MG/DL (ref 70–99)
POTASSIUM SERPL-SCNC: 3.8 MMOL/L (ref 3.4–5.3)
SODIUM SERPL-SCNC: 138 MMOL/L (ref 133–144)
URATE SERPL-MCNC: 5.1 MG/DL (ref 2.6–6)

## 2021-03-04 NOTE — RESULT ENCOUNTER NOTE
Here are your lab results from your recent visit...  -Your basic metabolic panel (which includes electrolyte levels, blood sugar level and kidney function tests) looks pretty good, especially in terms of the improved kidney function labs (GFR improved from 30-40s to 52).  -Your hemoglobin A1C (the three month average of your glucose levels) looks better- going down from 8.7 to 8.0.      Please let me know if you have any questions.  Best,   Lev Tristan MD

## 2021-03-05 ENCOUNTER — TELEPHONE (OUTPATIENT)
Dept: RHEUMATOLOGY | Facility: CLINIC | Age: 81
End: 2021-03-05

## 2021-03-05 DIAGNOSIS — N39.0 URINARY TRACT INFECTION WITH HEMATURIA, SITE UNSPECIFIED: ICD-10-CM

## 2021-03-05 DIAGNOSIS — R31.9 URINARY TRACT INFECTION WITH HEMATURIA, SITE UNSPECIFIED: ICD-10-CM

## 2021-03-05 DIAGNOSIS — R82.90 ABNORMAL URINE: Primary | ICD-10-CM

## 2021-03-05 LAB
GAMMA INTERFERON BACKGROUND BLD IA-ACNC: 0.01 IU/ML
M TB IFN-G CD4+ BCKGRND COR BLD-ACNC: 9.99 IU/ML
M TB TUBERC IFN-G BLD QL: NEGATIVE
MITOGEN IGNF BCKGRD COR BLD-ACNC: 0.02 IU/ML
MITOGEN IGNF BCKGRD COR BLD-ACNC: 0.3 IU/ML

## 2021-03-05 NOTE — TELEPHONE ENCOUNTER
Pt called back, increased frequency of urination, no pain with urination.  She states that she does have to sit there for a while to finish, she feels that there is more urine in there to come out.  No odor to urine.  Urine darker yellow than normal.    She does feel as though there is something going on.    Will send message to Dr. Lopez to review and advise.    Kamille Ruffin RN  [facility]

## 2021-03-05 NOTE — TELEPHONE ENCOUNTER
Called and spoke with pt, relayed information to pt regarding antibiotics and lab needed. Pt understands.      Attempted to get pt scheduled for lab tomorrow. She will start antibiotics and get lab done on Monday.    Kamille Ruffin RN  [facility]

## 2021-03-05 NOTE — TELEPHONE ENCOUNTER
----- Message from Zulma Lopez MD sent at 3/5/2021  1:45 AM CST -----  Regarding: could you plz call her?  Lots of protein and blood in the urine with high CRP inflammation, any symptoms of UTI?

## 2021-03-05 NOTE — TELEPHONE ENCOUNTER
Zulma Lopez MD  You 9 minutes ago (4:02 PM)     Addendum:     1-I changed it from 500 mg qid to 250 mg qid, epic gave me warning with the dose which could be age. Per update dose could be 250-500 mg qid.     2-Allergy to penicillin was N/V not rash so I did override for Rx to go through.    Message text        Zulma Lopez MD  You 14 minutes ago (3:57 PM)     I recommended to repeat U/A+culture (ordered). Culture was not done, so I don't have sensitivity to see which antibiotic might work best. She could start taking keflex 500 mg every 6 hours x  5 days till I get results back. I will send Rx.

## 2021-03-08 PROBLEM — N39.41 URGE INCONTINENCE OF URINE: Status: ACTIVE | Noted: 2019-10-02

## 2021-03-09 ENCOUNTER — DOCUMENTATION ONLY (OUTPATIENT)
Dept: SLEEP MEDICINE | Facility: CLINIC | Age: 81
End: 2021-03-09

## 2021-03-09 DIAGNOSIS — R82.90 ABNORMAL URINE: ICD-10-CM

## 2021-03-09 LAB
ALBUMIN UR-MCNC: NEGATIVE MG/DL
APPEARANCE UR: CLEAR
BILIRUB UR QL STRIP: NEGATIVE
COLOR UR AUTO: ABNORMAL
GLUCOSE UR STRIP-MCNC: 50 MG/DL
HGB UR QL STRIP: ABNORMAL
KETONES UR STRIP-MCNC: NEGATIVE MG/DL
LEUKOCYTE ESTERASE UR QL STRIP: NEGATIVE
NITRATE UR QL: NEGATIVE
PH UR STRIP: 6 PH (ref 5–7)
RBC #/AREA URNS AUTO: 5 /HPF (ref 0–2)
SOURCE: ABNORMAL
SP GR UR STRIP: 1 (ref 1–1.03)
UROBILINOGEN UR STRIP-MCNC: 0 MG/DL (ref 0–2)
WBC #/AREA URNS AUTO: 1 /HPF (ref 0–5)

## 2021-03-09 PROCEDURE — 81001 URINALYSIS AUTO W/SCOPE: CPT | Performed by: PATHOLOGY

## 2021-03-09 PROCEDURE — 87086 URINE CULTURE/COLONY COUNT: CPT | Performed by: INTERNAL MEDICINE

## 2021-03-09 NOTE — PROGRESS NOTES
STM recheck          Subjective measures:   Patient states that she is feeling benefit from the pressure change.       Assessment: Pt not meeting objective benchmarks for leak, mask fit is variable. Patient meeting subjective benchmarks.     Action plan: follow up per provider request      Device type: Bilevel    PAP settings: 22/9    Mask type:  Full Face Mask    Objective measures: 14 day rolling measures      Compliance  80 % last 5 days after pressure change      Leak  74.5  lpm  last  Upload variable        AHI 12.3   last  Upload primary central events           Objective measure goal  Compliance   Goal >70%  Leak   Goal < 24 lpm  AHI  Goal < 5  Usage  Goal >240

## 2021-03-09 NOTE — LETTER
March 16, 2021      Betty GRISELDA Randal  [address]  [facility] [address]        Dear ,    We are writing to inform you of your test results.    UTI is gone. Are you feeling better?    Resulted Orders   Urine Culture Aerobic Bacterial   Result Value Ref Range    Specimen Description Midstream Urine     Special Requests Specimen received in preservative     Culture Micro No growth    Routine UA with Micro Reflex to Culture   Result Value Ref Range    Color Urine Straw     Appearance Urine Clear     Glucose Urine 50 (A) NEG^Negative mg/dL    Bilirubin Urine Negative NEG^Negative    Ketones Urine Negative NEG^Negative mg/dL    Specific Gravity Urine 1.005 1.003 - 1.035    Blood Urine Moderate (A) NEG^Negative    pH Urine 6.0 5.0 - 7.0 pH    Protein Albumin Urine Negative NEG^Negative mg/dL    Urobilinogen mg/dL 0.0 0.0 - 2.0 mg/dL    Nitrite Urine Negative NEG^Negative    Leukocyte Esterase Urine Negative NEG^Negative    Source Midstream Urine     WBC Urine 1 0 - 5 /HPF    RBC Urine 5 (H) 0 - 2 /HPF       If you have any questions or concerns, please call the clinic at the number listed above.       Sincerely,      Zulma Lopez MD

## 2021-03-10 LAB
BACTERIA SPEC CULT: NO GROWTH
Lab: NORMAL
SPECIMEN SOURCE: NORMAL

## 2021-03-11 DIAGNOSIS — J30.1 SEASONAL ALLERGIC RHINITIS DUE TO POLLEN: ICD-10-CM

## 2021-03-11 NOTE — TELEPHONE ENCOUNTER
CW,    Loratadine:    Routing refill request to provider for review/approval because:    Failed refill protocol:  Patient over the age of 64.    Last OV 3/3/21  Thanks,  Sophia Hemphill RN

## 2021-03-12 RX ORDER — LORATADINE 10 MG/1
TABLET ORAL
Qty: 90 TABLET | Refills: 1 | Status: SHIPPED | OUTPATIENT
Start: 2021-03-12 | End: 2021-09-14

## 2021-03-18 ENCOUNTER — TELEPHONE (OUTPATIENT)
Dept: PHARMACY | Facility: CLINIC | Age: 81
End: 2021-03-18

## 2021-03-18 NOTE — TELEPHONE ENCOUNTER
Called pt to f/u on last MTM visit. No answer, message left.   Marcos NicoleD, JAMES, BCACP  MTM Pharmacist, [facility]

## 2021-03-22 ENCOUNTER — VIRTUAL VISIT (OUTPATIENT)
Dept: PALLIATIVE MEDICINE | Facility: CLINIC | Age: 81
End: 2021-03-22
Payer: MEDICARE

## 2021-03-22 DIAGNOSIS — G57.02 PIRIFORMIS SYNDROME, LEFT: ICD-10-CM

## 2021-03-22 DIAGNOSIS — M54.16 LUMBAR RADICULOPATHY: ICD-10-CM

## 2021-03-22 DIAGNOSIS — M25.551 HIP PAIN, RIGHT: ICD-10-CM

## 2021-03-22 DIAGNOSIS — I50.43 ACUTE ON CHRONIC COMBINED SYSTOLIC AND DIASTOLIC HEART FAILURE (H): ICD-10-CM

## 2021-03-22 DIAGNOSIS — G57.01 PIRIFORMIS SYNDROME, RIGHT: Primary | ICD-10-CM

## 2021-03-22 PROCEDURE — 99213 OFFICE O/P EST LOW 20 MIN: CPT | Mod: 95 | Performed by: PHYSICAL MEDICINE & REHABILITATION

## 2021-03-22 ASSESSMENT — PAIN SCALES - GENERAL: PAINLEVEL: MODERATE PAIN (4)

## 2021-03-22 NOTE — PROGRESS NOTES
Sister Betty is a 80 year old who is being evaluated via a billable video visit.      How would you like to obtain your AVS? MyChart  If the video visit is dropped, the invitation should be resent by: Send to e-mail at: [email or web address]  Will anyone else be joining your video visit? Yes Nghia Lomeli, [facility]   [facility]    This encounter was changed to a telephone visit due to technical difficulties with the patient's hardware.                            [facility]    Date of visit: 3/22/21       Assessment:  Sister Betty Buckner is a 80 year old medically complex patient with past medical history including: Atrial fibrillation, VLH, History of DVT, CHF, HLD, HTN, DM 2, Stage 3 CKD, RLS, Depression, History of etoh abuse (remission 30+ years) who presents for evaluation and treatment of the following chronic pain conditions:     1. Right sided hip and leg pain: Patient describes a long standing history of right hip and leg pain with exacerbation in the past year. She reports pain that starts in her right low back/upper buttock and radiates laterally and posteriorly down the right leg to the foot. MRI was independently reviewed today and concerning for right S1 nerve root abutment/impingement. Her symptoms do correlate well with this. On exam she has a positive SLR as well as tenderness in the right piriformis, Neurological exam was normal. Based on our exam today, discussed that her symptoms are unlikely to be due to hip OA. DURGA and hip scour was negative today. She does have tight hip abductors and extensors which may exacerbate her underlying pain condition.    Completed a right S1 liliya with no relief or change in symptoms. Patient is interested in trying a piriformis injection and after discussion this order was placed.          Plan:  The following recommendations were given to the patient. Diagnosis, treatment options, risks, benefits, and  "alternatives were discussed, and all questions were answered. The patient expressed understanding of the plan for management.      I am recommending a multidisciplinary treatment plan to help this patient better manage her pain.  This includes:      1. Physical Therapy: PT referral placed for hip, low back pain and piriformis syndrome.  2. Clinical Health Pain Psychologist: Coping well, baseline memory issues, likely to be of limited benefit.  3. Diagnostic Studies: None at this time.  4. Medication Management:   1. Continue gabapentin 300mg TID. Consider increasing to 600mg TID.  2. Recommended stopping tramadol and norco due to fogginess.  3. Continue tylenol 1000mg TID prn.  5. Further procedures recommended: right piriformis injection ordered  6. Follow up: 1 month after piriformis injection.      [facility]           Chief complaint:   Chief Complaint   Patient presents with     Pain       Interval history:  Betty Tee is a 80 year old female last seen by me on 1/7/21.        Since her last visit, Betty Tee reports:  -She had a right S1 liliya on 2/24/21 without any improvement in her pain.    -Continues having low back pain and right hip pain. The pain in the right low back and hip seems to be worse than the leg symptoms at this time.    -Still gets pain shooting into the right leg posteriorly and laterally.    -The leg and hip \"feel weak\". She denies any falls recently.    -Interested in PT and trying a piriformis injections as previously discussed.    -Not interested in medication changes at this time.    Pain scores:  Pain intensity on average is 7 on a scale of 0-10.        Pain Treatments:  1. Medications:       Current pain medications:  -Gabapentin 300mg TID  -Tylenol 1000mg q8h prn  -Escitalopram 20mg daily       Previous pain medications:  -Tramadol 50mg prn  -Norco 5-325mg prn  2. Physical Therapy: hasn't completed PT for low back or hip pain           "      TENS unit: hasn't tried  3. Pain psychology: hasn't tried  4. Surgery: Lumbar spine surgery in the 1960s.  5. Injections: -Two epidural injections (right L5 tfesi), feb 2020 was helpful the next day but she had a fall right after, aug 2020 procedure - not helpful, had worsening pain  6. Alternative Therapies:               Chiropractic: hasn't tried               Acupuncture: hasn't tried          Side Effects: no side effect    Medications:  Current Outpatient Medications   Medication Sig Dispense Refill     acetaminophen (TYLENOL) 500 MG tablet Take 1,000 mg by mouth every 8 hours as needed (max 6 tablets/24 hours, 2 tablets/dose)        acyclovir (ZOVIRAX) 400 MG tablet TAKE 1 TABLET (400 MG) BY MOUTH 3 TIMES DAILY FOR A COUPLE DAYS 15 tablet 2     acyclovir (ZOVIRAX) 5 % external ointment Apply topically 6 times daily As needed for outbreaks 15 g 3     albuterol (PROAIR HFA, PROVENTIL HFA, VENTOLIN HFA) 108 (90 BASE) MCG/ACT inhaler Inhale 2 puffs into the lungs every 6 hours 1 Inhaler 3     alendronate (FOSAMAX) 70 MG tablet Take 1 tablet (70 mg) by mouth every 7 days 12 tablet 1     anakinra (KINERET) 100 MG/0.67ML SOSY injection 100 mg every day x 3 days as needed for flare ups 6.7 mL 3     atorvastatin (LIPITOR) 10 MG tablet TAKE 1/2 TABLET BY MOUTH EVERY DAY 45 tablet 3     azithromycin (ZITHROMAX) 250 MG tablet TAKE 1 TABLET BY MOUTH EVERY DAY 30 tablet 1     BD JANE U/F 32G X 4 MM insulin pen needle USE 4 DAILY AS DIRECTED. 400 each 0     cephALEXin (KEFLEX) 250 MG capsule Take 1 capsule (250 mg) by mouth every 6 hours 20 capsule 0     CEVIMELINE 30 MG PO capsule TAKE 1 CAPSULE (30 MG) BY MOUTH 3 TIMES DAILY (Patient taking differently: Take 30 mg by mouth every other day ) 270 capsule 2     escitalopram (LEXAPRO) 20 MG tablet TAKE 1 TABLET BY MOUTH EVERY DAY 90 tablet 0     famotidine (PEPCID) 10 MG tablet Take 10 mg by mouth 2 times daily       fluticasone (FLONASE) 50 MCG/ACT nasal spray Spray 1-2  sprays into both nostrils daily as needed for allergies 18.2 mL 11     fluticasone-vilanterol (BREO ELLIPTA) 100-25 MCG/INH inhaler Inhale 1 puff into the lungs daily 1 Inhaler 11     gabapentin (NEURONTIN) 300 MG capsule Take 1 capsule (300 mg) by mouth 3 times daily 270 capsule 0     HYDROcodone-acetaminophen (NORCO) 7.5-325 MG per tablet Take 1 tablet by mouth every 12 hours 60 tablet 0     hydroxychloroquine (PLAQUENIL) 200 MG tablet TAKE 2 TABLETS (400 MG) BY MOUTH DAILY ANNUAL PLAQUENIL TOXICITY EYE SCREENING REQUIRED. 180 tablet 0     insulin aspart (NOVOLOG FLEXPEN) 100 UNIT/ML pen INJECT 12 UNITS SUBCUTANEOUS 3 TIMES DAILY (WITH MEALS) 15 mL 2     insulin glargine (BASAGLAR KWIKPEN) 100 UNIT/ML pen INJECT 22 UNITS SUBCUTANEOUS DAILY (TO REPLACE LANTUS) 15 mL 1     lidocaine (LIDODERM) 5 % patch APPLY PATCH TO PAINFUL AREA FOR UP TO 12 H WITHIN A 24 H PERIOD. REMOVE AFTER 12 HOURS. 30 patch 2     loratadine (CLARITIN) 10 MG tablet TAKE 1 TABLET BY MOUTH EVERY DAY 90 tablet 1     metoprolol succinate ER (TOPROL-XL) 25 MG 24 hr tablet TAKE 1/2 TABLET BY MOUTH 2 TIMES DAILY WITH 50 MG FOR A TOTAL OF 62.5 TWICE A DAY 90 tablet 1     metoprolol succinate ER (TOPROL-XL) 50 MG 24 hr tablet Take 50 mg by mouth in combination with 12.5 for a total of 62.5 twice a day 180 tablet 3     montelukast (SINGULAIR) 10 MG tablet TAKE 1 TABLET BY MOUTH EVERYDAY AT BEDTIME 90 tablet 0     OZEMPIC, 1 MG/DOSE, 2 MG/1.5ML pen INJECT 1 MG SUBCUTANEOUS EVERY 7 DAYS 3 mL 0     potassium chloride ER (KLOR-CON) 20 MEQ CR tablet Take 2 tablets (40 mEq) by mouth every morning AND 1 tablet (20 mEq) every evening. 270 tablet 3     predniSONE (DELTASONE) 5 MG tablet Take 1 tablet (5 mg) by mouth daily 30 tablet 0     rivaroxaban ANTICOAGULANT (XARELTO ANTICOAGULANT) 20 MG TABS tablet Take 1 tablet (20 mg) by mouth daily (with dinner) 90 tablet 2     spironolactone (ALDACTONE) 25 MG tablet Take 2 tablets (50 mg) by mouth daily 180 tablet 3      torsemide (DEMADEX) 10 MG tablet TAKE ONE TAB (10 MG) WITH ONE 20 MG TAB TO = 30 MG DAILY IN AFTERNOON. 90 tablet 3     torsemide (DEMADEX) 20 MG tablet TAKE 2 TABS (40 MG) IN AM DAILY AND TAKE 1 TAB BY MOUTH IN THE AFTERNOON ALONG W/ A 10 MG  tablet 4     traZODone (DESYREL) 50 MG tablet TAKE 0.5-1.5 TABLETS (25-75 MG) BY MOUTH NIGHTLY AS NEEDED FOR SLEEP 135 tablet 0     vitamin D3 (CHOLECALCIFEROL) 50 mcg (2000 units) tablet Take 1 tablet (50 mcg) by mouth daily 90 tablet 2       Medical History: any changes in medical history since they were last seen? No    Review of Systems:  The 14 system ROS was reviewed from the intake questionnaire, and is positive for: edema, diabetes, gout,  back pain, arthritis  Any bowel or bladder problems: frequency, urgency  Mood: denies    The duration of this telephone encounter was 10 minutes.      BILLING TIME DOCUMENTATION:   The total TIME spent on this patient on the date of the encounter/appointment was 25 minutes.      TOTAL TIME includes:   Time spent preparing to see the patient (reviewing records and tests)   Time spent face to face (or over the phone) with the patient   Time spent ordering tests, medications, procedures and referrals   Time spent documenting clinical information in Epic         Charly Moore DO  [facility]

## 2021-03-22 NOTE — PATIENT INSTRUCTIONS
1. I ordered a right piriformis muscle injection, the schedulers will call you to set this up.    2. I ordered PT to start working on your low back and hip pain.    3. Follow up 1 month after the injection.    Take brenda,    DO [facility]            ----------------------------------------------------------------  Clinic Number:  [phone]     Call with any questions about your care and for scheduling assistance.     Calls are returned Monday through Friday between 8 AM and 4:30 PM. We usually get back to you within 2 business days depending on the issue/request.    If we are prescribing your medications:    For opioid medication refills, call the clinic or send a [employer] message 7 days in advance.  Please include:    Name of requested medication    Name of the pharmacy.    For non-opioid medications, call your pharmacy directly to request a refill. Please allow 3-4 days to be processed.     Per [place] State Law:    All controlled substance prescriptions must be filled within 30 days of being written.      For those controlled substances allowing refills, pickup must occur within 30 days of last fill.      We believe regular attendance is key to your success in our program!      Any time you are unable to keep your appointment we ask that you call us at least 24 hours in advance to cancel.This will allow us to offer the appointment time to another patient.     Multiple missed appointments may lead to dismissal from the clinic.

## 2021-03-24 ENCOUNTER — TELEPHONE (OUTPATIENT)
Dept: RHEUMATOLOGY | Facility: CLINIC | Age: 81
End: 2021-03-24

## 2021-03-26 RX ORDER — TORSEMIDE 10 MG/1
TABLET ORAL
Qty: 90 TABLET | Refills: 0 | Status: SHIPPED | OUTPATIENT
Start: 2021-03-26 | End: 2021-06-20

## 2021-03-26 NOTE — TELEPHONE ENCOUNTER
Last Clinic Visit: 8/27/2020  [facility]

## 2021-03-30 DIAGNOSIS — Z79.4 TYPE 2 DIABETES MELLITUS WITH HYPERGLYCEMIA, WITH LONG-TERM CURRENT USE OF INSULIN (H): ICD-10-CM

## 2021-03-30 DIAGNOSIS — E11.65 TYPE 2 DIABETES MELLITUS WITH HYPERGLYCEMIA, WITH LONG-TERM CURRENT USE OF INSULIN (H): ICD-10-CM

## 2021-03-31 RX ORDER — SEMAGLUTIDE 1.34 MG/ML
1 INJECTION, SOLUTION SUBCUTANEOUS
Qty: 3 ML | Refills: 1 | Status: SHIPPED | OUTPATIENT
Start: 2021-03-31 | End: 2021-05-10

## 2021-03-31 NOTE — TELEPHONE ENCOUNTER
"Requested Prescriptions   Signed Prescriptions Disp Refills    OZEMPIC, 1 MG/DOSE, 2 MG/1.5ML pen 3 mL 1     Sig: INJECT 1 MG SUBCUTANEOUS EVERY 7 DAYS       GLP-1 Agonists Protocol Passed - 3/30/2021 12:37 AM        Passed - HgbA1C in past 3 or 6 months     If HgbA1C is 8 or greater, it needs to be on file within the past 3 months.  If less than 8, must be on file within the past 6 months.     Recent Labs   Lab Test 03/03/21  1331   A1C 8.0*             Passed - Medication is active on med list        Passed - Patient is age 18 or older        Passed - No active pregnancy on record        Passed - Normal serum creatinine on file in past 12 months     Recent Labs   Lab Test 03/03/21  1331 04/02/17  2213 04/02/17  2213   CR 1.02   < >  --    CREAT  --   --  0.6    < > = values in this interval not displayed.       Ok to refill medication if creatinine is low          Passed - No positive pregnancy test in past 12 months        Passed - Recent (6 mo) or future (30 days) visit within the authorizing provider's specialty     Patient had office visit in the last 6 months or has a visit in the next 30 days with authorizing provider.  See \"Patient Info\" tab in inbasket, or \"Choose Columns\" in Meds & Orders section of the refill encounter.               "

## 2021-03-31 NOTE — PROGRESS NOTES
[facility] - Procedure Note    Date of Service: 4/1/2021    Procedure performed: Right piriformis injection  Diagnosis: Myofascial trigger point/piriformis syndrome  : Charly Moore DO & Tracy Frazier MD (Fellow)     Indications: Betty Tee is a 80 year old female who is seen for a right piriformis injection. The patient describes pain in her right low back that radiates down the right leg to the calf. She has tenderness in the region of the right piriformis muscle. The patient reports minimal improvement with conservative treatment, including oral medications and exercises.        Allergies:      Allergies   Allergen Reactions     Amoxicillin-Pot Clavulanate      Augmentin Nausea and Vomiting     Codeine Nausea and Vomiting     Codeine      PN: LW Reaction: HIVES     Penicillins Nausea and Vomiting     PN: LW Reaction: GI Upset     Phenobarbital Itching     Phenobarbital      Seasonal Allergies         Vitals:  /88 (BP Location: Left arm)   Pulse 59   SpO2 99%     Review of Systems: The patient denies recent fever, chills, illness, use of antibiotics or anticoagulants. All other 10-point review of systems negative.     Procedure:   A fluoroscopic view including the SI joint and the superiolateral border of the right acetabulum was obtained.  A location 1/3 of the distance medial from the acetabulum to the SI joint, overlying the ileum, was marked.  2 ml of Lidocaine 1% was used to anesthetize the skin. The needle was advance under intermittent fluroscopy using the two-pop method through the gluteal muscle to the deeper piriformis muscle. There were no paresthesias or contractions noted in the calf or foot.  At this point, 1 mL of Omnipaque was injected and 9mL wasted, with a flow consistent with piriformis muscle spread.  A solution containing 2 ml of 0.5% bupivacaine and 40 mg of kenalog was injected.  The needle was removed.     Assessment/Plan: Betty  GRISELDA Tee is a 80 year old female s/p Right piriformis injection today for myositis/piriformis syndrome.     Pre procecedure pain score: 8/10   Post procedure pain score: 4/10.     1. The patient was advised to contact the [facility] for any of the following:   Fever, chills, or night sweats   New onset of pain, numbness, or weakness   Any questions/concerns regarding the procedure  If unable to contact the Pain Center, the patient was instructed to go to a local Emergency Room for any complications.   2. The patient will receive a follow-up call in 1 week.      Charly Moore DO  [facility] [place]

## 2021-04-01 ENCOUNTER — RADIOLOGY INJECTION OFFICE VISIT (OUTPATIENT)
Dept: PALLIATIVE MEDICINE | Facility: CLINIC | Age: 81
End: 2021-04-01
Attending: PHYSICAL MEDICINE & REHABILITATION
Payer: MEDICARE

## 2021-04-01 VITALS — DIASTOLIC BLOOD PRESSURE: 88 MMHG | OXYGEN SATURATION: 99 % | HEART RATE: 59 BPM | SYSTOLIC BLOOD PRESSURE: 136 MMHG

## 2021-04-01 DIAGNOSIS — M79.18 MYOFASCIAL PAIN: ICD-10-CM

## 2021-04-01 DIAGNOSIS — G57.01 PIRIFORMIS SYNDROME, RIGHT: Primary | ICD-10-CM

## 2021-04-01 PROCEDURE — 20552 NJX 1/MLT TRIGGER POINT 1/2: CPT | Performed by: PHYSICAL MEDICINE & REHABILITATION

## 2021-04-01 PROCEDURE — 77002 NEEDLE LOCALIZATION BY XRAY: CPT | Mod: 26 | Performed by: PHYSICAL MEDICINE & REHABILITATION

## 2021-04-01 NOTE — NURSING NOTE
Discharge Information    IV Discontiued Time:  NA    Amount of Fluid Infused:  NA    Discharge Criteria = When patient returns to baseline or as per MD order    Consciousness:  Pt is fully awake    Circulation:  BP +/- 20% of pre-procedure level    Respiration:  Patient is able to breathe deeply    O2 Sat:  Patient is able to maintain O2 Sat >92% on room air    Activity:  Moves 4 extremities on command    Ambulation:  Patient is able to stand and walk or stand and pivot into wheelchair    Dressing:  Clean/dry or No Dressing    Notes:   Discharge instructions and AVS given to patient    Patient meets criteria for discharge?  YES    Admitted to PCU?  No    Responsible adult present to accompany patient home?  Yes    Signature/Title:    Susan Marin RN Care Coordinator  [facility] [place]

## 2021-04-01 NOTE — PATIENT INSTRUCTIONS
[facility] Procedure Discharge Instructions    Today you saw:     Dr. Charly Moore    Your procedure:    Piriformis injection     Medications used:  Lidocaine (anesthetic)    Kenalog (steroid)  Omnipaque (contrast)                 Be cautious when walking as numbness and/or weakness in the legs may occur up to 6-8 hours after the procedure due to effect of the local anesthetic    Do not drive for 6 hours. The effect of the local anesthetic could slow your reflexes.     Avoid strenuous activity for the first 24 hours. You may resume your regular activities after that.     You may shower, however avoid swimming, tub baths or hot tubs for 24 hours following your procedure    You may have a mild to moderate increase in pain for several days following the injection.      You may use ice packs for 10-15 minutes, 3 to 4 times a day at the injection site for comfort    Do not use heat to painful areas for 6 to 8 hours. This will give the local anesthetic time to wear off and prevent you from accidentally burning your skin.    Unless you have been directed to avoid the use of anti-inflammatory medications (NSAIDS-ibuprofen, Aleve, Motrin), you may use these medications or Tylenol for pain control if needed.     With diabetes, check your blood sugar more frequently than usual as your blood sugar may be higher than normal for 10-14 days following a steroid injection. Contact your doctor who manages your diabetes if your blood sugar is higher than usual    Possible side effects of steroids that you may experience include flushing, elevated blood pressure, increased appetite, mild headaches and restlessness.  All of these symptoms will get better with time.    It may take up to 14 days for the steroid medication to start working although you may feel the effect as early as a few days after the procedure.     Follow up with your referring provider in 4 weeks by video visit      If you experience any of  the following, call the pain center line during work hours at [phone] or on-call physician after hours at [phone]:  -Fever over 100 degree F  -Swelling, bleeding, redness, drainage, warmth at the injection site  -Progressive weakness or numbness in your legs   -Loss of bowel or bladder function  -Unusual headache that is not relieved by Tylenol or your regular headache medication  -Unusual new onset of pain that is not improving

## 2021-04-01 NOTE — NURSING NOTE
Pre-procedure Intake    Have you been fasting? NA    If yes, for how long?     Are you taking a prescribed blood thinner such as coumadin, Plavix, Xarelto?    Yes -   Other blood thinners    If yes, when did you take your last dose? Xarelto, 20 mg, last dose yesterday at 5pm    Do you take aspirin?  No    If cervical procedure, have you held aspirin for 6 days?   NA    Do you have any allergies to contrast dye, iodine, steroid and/or numbing medications?  NO    Are you currently taking antibiotics or have an active infection?  YES: Taking for chronic use, Zithromyacin, 250 mg, takes at bedtime    Have you had a fever/elevated temperature within the past week? NO    Are you currently taking oral steroids? YES: Taking for chronic use, Predmisone, 5mg, Takes in the morning, did not take today    Do you have a ? Yes       Are you pregnant or breastfeeding?  Not Applicable    Are the vital signs normal?  Yes

## 2021-04-09 DIAGNOSIS — I48.91 ATRIAL FIBRILLATION WITH CONTROLLED VENTRICULAR RESPONSE (H): ICD-10-CM

## 2021-04-12 RX ORDER — METOPROLOL SUCCINATE 50 MG/1
TABLET, EXTENDED RELEASE ORAL
Qty: 180 TABLET | Refills: 1 | Status: SHIPPED | OUTPATIENT
Start: 2021-04-12 | End: 2021-06-04

## 2021-04-15 ENCOUNTER — THERAPY VISIT (OUTPATIENT)
Dept: PHYSICAL THERAPY | Facility: CLINIC | Age: 81
End: 2021-04-15
Attending: PHYSICAL MEDICINE & REHABILITATION
Payer: MEDICARE

## 2021-04-15 DIAGNOSIS — M25.551 HIP PAIN, RIGHT: ICD-10-CM

## 2021-04-15 DIAGNOSIS — G57.01 PIRIFORMIS SYNDROME, RIGHT: ICD-10-CM

## 2021-04-15 DIAGNOSIS — M54.16 LUMBAR RADICULOPATHY: ICD-10-CM

## 2021-04-15 PROCEDURE — 97161 PT EVAL LOW COMPLEX 20 MIN: CPT | Mod: GP | Performed by: PHYSICAL THERAPIST

## 2021-04-15 PROCEDURE — 97110 THERAPEUTIC EXERCISES: CPT | Mod: GP | Performed by: PHYSICAL THERAPIST

## 2021-04-15 ASSESSMENT — ACTIVITIES OF DAILY LIVING (ADL)
SITTING_FOR_15_MINUTES: MODERATE DIFFICULTY
HOS_ADL_COUNT: 10
STEPPING_UP_AND_DOWN_CURBS: SLIGHT DIFFICULTY
GETTING_INTO_AND_OUT_OF_AN_AVERAGE_CAR: MODERATE DIFFICULTY
GETTING_INTO_AND_OUT_OF_A_BATHTUB: SLIGHT DIFFICULTY
GOING_UP_1_FLIGHT_OF_STAIRS: MODERATE DIFFICULTY
ROLLING_OVER_IN_BED: MODERATE DIFFICULTY
LIGHT_TO_MODERATE_WORK: SLIGHT DIFFICULTY
HOS_ADL_ITEM_SCORE_TOTAL: 23
WALKING_INITIALLY: MODERATE DIFFICULTY
HOW_WOULD_YOU_RATE_YOUR_CURRENT_LEVEL_OF_FUNCTION_DURING_YOUR_USUAL_ACTIVITIES_OF_DAILY_LIVING_FROM_0_TO_100_WITH_100_BEING_YOUR_LEVEL_OF_FUNCTION_PRIOR_TO_YOUR_HIP_PROBLEM_AND_0_BEING_THE_INABILITY_TO_PERFORM_ANY_OF_YOUR_USUAL_DAILY_ACTIVITIES?: 78
WALKING_APPROXIMATELY_10_MINUTES: MODERATE DIFFICULTY
HOS_ADL_HIGHEST_POTENTIAL_SCORE: 40
STANDING_FOR_15_MINUTES: MODERATE DIFFICULTY
GOING_DOWN_1_FLIGHT_OF_STAIRS: MODERATE DIFFICULTY
PUTTING_ON_SOCKS_AND_SHOES: MODERATE DIFFICULTY

## 2021-04-15 NOTE — PROGRESS NOTES
Physical Therapy Initial Evaluation  Subjective:  The history is provided by the patient. No  was used.   Patient Health History  Betty Tee being seen for Low Back Pain, R piriformis pain.     Problem began: 3/22/2021 (referral date).   Problem occurred: pt reports had her right hip pain    Pain is reported as 3/10 on pain scale.    Health conditions: high BP, numbness/tingling, overweight, diabetes, heart problems.   Red flags:  None as reported by patient.  Medical allergies: none.   Surgeries include:  Orthopedic surgery (low back surgery).    Current medications: high BP, coumadin.    Current occupation is Retired.                     Therapist Generated HPI Evaluation         Type of problem:  Right hip.    This is a chronic condition.  Condition occurred with:  Other reason.  Where condition occurred: other.  Patient reports pain:  Greater trochanter, groin, lateral, medial and posterior.  Pain is described as aching and burning and is intermittent.    Since onset symptoms are gradually improving.  Associated symptoms:  Loss of strength. Symptoms are exacerbated by certain positions        Barriers include:  None as reported by patient.                        Objective:  System         Lumbar/SI Evaluation  ROM:    AROM Lumbar:   Flexion:          Min loss, painfree  Ext:                    Min loss, painfree   Side Bend:        Left:     Right:   Rotation:           Left:     Right:   Side Glide:        Left:  Mod loss, painful    Right:  Mod loss, painful        Strength: 2+/5 Lower Extremity Lowering Strength, poor core strength  Lumbar Myotomes:    T12-L3 (Hip Flex):  Left: 3+    Right: 4+  L2-4 (Quads):  Left:  4    Right:  4+  L4 (Ankle DF):  Left:  4+    Right:  4+          Lumbar Dermtomes:  normal                                                            Hip Evaluation  HIP AROM:    Flexion: Left: full, painfree    Right:  Full, painfree                      Hip Special  Testing:      Left hip negative for the following special tests:  Piriformis   Right hip positive for the following special tests:  Piriformis                 General     ROS    Assessment/Plan:    Patient is a 80 year old female with right side hip complaints.    Patient has the following significant findings with corresponding treatment plan.                Diagnosis 1:  Right Hip Pain  Pain -  manual therapy, self management, education and home program  Decreased ROM/flexibility - manual therapy and therapeutic exercise  Decreased strength - therapeutic exercise and therapeutic activities  Impaired gait - gait training    Therapy Evaluation Codes:   1) History comprised of:   Personal factors that impact the plan of care:      Time since onset of symptoms.    Comorbidity factors that impact the plan of care are:      None.     Medications impacting care: None.  2) Examination of Body Systems comprised of:   Body structures and functions that impact the plan of care:      right hip, low back.   Activity limitations that impact the plan of care are:      standing, walking.  3) Clinical presentation characteristics are:   Stable/Uncomplicated.  4) Decision-Making    Moderate complexity using standardized patient assessment instrument and/or measureable assessment of functional outcome.  Cumulative Therapy Evaluation is: Low complexity.    Previous and current functional limitations:  (See Goal Flow Sheet for this information)    Short term and Long term goals: (See Goal Flow Sheet for this information)     Communication ability:  Patient appears to be able to clearly communicate and understand verbal and written communication and follow directions correctly.  Treatment Explanation - The following has been discussed with the patient:   RX ordered/plan of care  Anticipated outcomes  Possible risks and side effects  This patient would benefit from PT intervention to resume normal activities.   Rehab potential is  excellent.    Frequency:  1 X week, once daily  Duration:  for 8 weeks  Discharge Plan:  Achieve all LTG.  Independent in home treatment program.  Reach maximal therapeutic benefit.    Please refer to the daily flowsheet for treatment today, total treatment time and time spent performing 1:1 timed codes.

## 2021-04-15 NOTE — LETTER
DEPARTMENT OF HEALTH AND HUMAN SERVICES  CENTERS FOR MEDICARE & MEDICAID SERVICES    PLAN/UPDATED PLAN OF PROGRESS FOR OUTPATIENT REHABILITATION     PATIENTS NAME:  Betty Tee   : 1940  PROVIDER NUMBER:    [id number]  HICN:  [id number]  PROVIDER NAME: [facility]  MEDICAL RECORD NUMBER: [id number]   START OF CARE DATE:  SOC Date: 04/15/21   TYPE:  PT  PRIMARY/TREATMENT DIAGNOSIS: (Pertinent Medical Diagnosis)  Hip pain, right  Lumbar radiculopathy  Piriformis syndrome, right  VISITS FROM START OF CARE:   1     Physical Therapy Initial Evaluation  Subjective:  The history is provided by the patient. No  was used.   Patient Health History  Betty Tee being seen for Low Back Pain, R piriformis pain.   Problem began: 3/22/2021 (referral date).   Problem occurred: pt reports had her right hip pain    Pain is reported as 3/10 on pain scale.  Health conditions: high BP, numbness/tingling, overweight, diabetes, heart problems.   Red flags:  None as reported by patient.  Medical allergies: none.   Surgeries include:  Orthopedic surgery (low back surgery).    Current medications: high BP, coumadin.    Current occupation is Retired.   Therapist Generated HPI Evaluation  Type of problem:  Right hip.  This is a chronic condition.  Condition occurred with:  Other reason.  Where condition occurred: other.  Patient reports pain:  Greater trochanter, groin, lateral, medial and posterior.  Pain is described as aching and burning and is intermittent.  Since onset symptoms are gradually improving.  Associated symptoms:  Loss of strength. Symptoms are exacerbated by certain positions    Barriers include:  None as reported by patient.              Objective:  Lumbar/SI Evaluation  ROM:    AROM Lumbar:   Flexion:          Min loss, painfree  Ext:                    Min loss, painfree   Side Bend:        Left:     Right:   Rotation:           Left:     Right:    Side Glide:        Left:  Mod loss, painful    Right:  Mod loss, painful  Strength: 2+/5 Lower Extremity Lowering Strength, poor core strength  PATIENTS NAME:  Betty Tee   : 1940    Lumbar Myotomes:    T12-L3 (Hip Flex):  Left: 3+    Right: 4+  L2-4 (Quads):  Left:  4    Right:  4+  L4 (Ankle DF):  Left:  4+    Right:  4+  Lumbar Dermtomes:  normal       Hip Evaluation  HIP AROM:    Flexion: Left: full, painfree    Right:  Full, painfree  Hip Special Testing:    Left hip negative for the following special tests:  Piriformis   Right hip positive for the following special tests:  Piriformis      Assessment/Plan:    Patient is a 80 year old female with right side hip complaints.    Patient has the following significant findings with corresponding treatment plan.                Diagnosis 1:  Right Hip Pain  Pain -  manual therapy, self management, education and home program  Decreased ROM/flexibility - manual therapy and therapeutic exercise  Decreased strength - therapeutic exercise and therapeutic activities  Impaired gait - gait training    Therapy Evaluation Codes:   1) History comprised of:   Personal factors that impact the plan of care:      Time since onset of symptoms.    Comorbidity factors that impact the plan of care are:      None.     Medications impacting care: None.  2) Examination of Body Systems comprised of:   Body structures and functions that impact the plan of care:      right hip, low back.   Activity limitations that impact the plan of care are:      standing, walking.  3) Clinical presentation characteristics are:   Stable/Uncomplicated.  4) Decision-Making    Moderate complexity using standardized patient assessment instrument   and/or measureable assessment of functional outcome.  Cumulative Therapy Evaluation is: Low complexity.    Previous and current functional limitations:  (See Goal Flow Sheet for this information)    Short term and Long term goals: (See Goal Flow Sheet  "for this information)   Communication ability:  Patient appears to be able to clearly communicate and understand verbal and written communication and follow directions correctly.  Treatment Explanation - The following has been discussed with the patient:   RX ordered/plan of care, Anticipated outcomes, Possible risks and side effects          PATIENTS NAME:  Betty Tee   : 1940    This patient would benefit from PT intervention to resume normal activities.   Rehab potential is excellent.  Frequency:  1 X week, once daily  Duration:  for 8 weeks  Discharge Plan:  Achieve all LTG.  Independent in home treatment program.  Reach maximal therapeutic benefit.              Caregiver Signature/Credentials _____________________________ Date ________         Beatriz Mercer, PT, DPT     I have reviewed and certified the need for these services and plan of treatment while under my care.        PHYSICIAN'S SIGNATURE:   _________________________________________      Date___________   Charly Moore MD      Certification period:  Beginning of Cert date period: 04/15/21 to 21     Functional Level Progress Report: Please see attached \"Goal Flow sheet for Functional level.\"    ____X____ Continue Services or       ________ DC Services                Service dates: From  SOC Date: 04/15/21 to present                         "

## 2021-04-16 ENCOUNTER — VIRTUAL VISIT (OUTPATIENT)
Dept: PULMONOLOGY | Facility: CLINIC | Age: 81
End: 2021-04-16
Attending: INTERNAL MEDICINE
Payer: MEDICARE

## 2021-04-16 DIAGNOSIS — J44.89 FOLLICULAR BRONCHIOLITIS (H): Primary | ICD-10-CM

## 2021-04-16 PROCEDURE — 99214 OFFICE O/P EST MOD 30 MIN: CPT | Mod: 95 | Performed by: INTERNAL MEDICINE

## 2021-04-16 NOTE — PROGRESS NOTES
"Sister Betty is a 80 year old who is being evaluated via a billable video visit.      How would you like to obtain your AVS? Mail a copy  If the video visit is dropped, the invitation should be resent by: Other e-mail: Haydee  Will anyone else be joining your video visit? No      Video Start Time:  11:37am  Video-Visit Details    Type of service:  Video Visit    Video End Time: 11:50am    Originating Location (pt. Location): Home    Distant Location (provider location):  [facility] AND [facility]     Platform used for Video Visit: [employer]          [facility]    Reason for Visit  Betty Tee is a 80 year old year old female who is being seen for No chief complaint on file.    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  She had mild ILD on a previous chest CT scan in 2015, but follow-up chest CT scan in 2018 showed improvement with no obvious ILD present on the CT scan.  The follicular bronchiolitis changes were still present.  She has been on prednisone for her Sjogren's for approximately 5 years, and that is managed by Dr. Webster in rheumatology.  Her current prednisone dose is 5 mg daily.    Today, she reports that her breathing is \"pretty good.\"  She does experience some dyspnea on exertion when she walks up a flight of stairs, and that is unchanged.  Her activity level is limited by hip and back pain.  She does use a walker.  She started physical therapy today and is hoping that she will be able to walk without a walker.  She denies cough, wheezing, or fevers.  She denies paroxysmal nocturnal dyspnea and does wear CPAP at nighttime.  She does endorse sneezing secondary to seasonal allergies, for which she takes Claritin and Singulair.  Her medications for her follicular bronchiolitis currently include prednisone 5 mg " daily, azithromycin 250 mg daily and Breo Ellipta inhaler, which are all anti-inflammatories for her follicular bronchiolitis.  She reports that she sometimes forgets to use the Breo Ellipta inhaler.    She did receive the Pfizer COVID-19 vaccine.        Current Outpatient Medications   Medication     acetaminophen (TYLENOL) 500 MG tablet     acyclovir (ZOVIRAX) 400 MG tablet     acyclovir (ZOVIRAX) 5 % external ointment     albuterol (PROAIR HFA, PROVENTIL HFA, VENTOLIN HFA) 108 (90 BASE) MCG/ACT inhaler     alendronate (FOSAMAX) 70 MG tablet     anakinra (KINERET) 100 MG/0.67ML SOSY injection     atorvastatin (LIPITOR) 10 MG tablet     azithromycin (ZITHROMAX) 250 MG tablet     BD JANE U/F 32G X 4 MM insulin pen needle     cephALEXin (KEFLEX) 250 MG capsule     CEVIMELINE 30 MG PO capsule     escitalopram (LEXAPRO) 20 MG tablet     famotidine (PEPCID) 10 MG tablet     fluticasone (FLONASE) 50 MCG/ACT nasal spray     fluticasone-vilanterol (BREO ELLIPTA) 100-25 MCG/INH inhaler     gabapentin (NEURONTIN) 300 MG capsule     HYDROcodone-acetaminophen (NORCO) 7.5-325 MG per tablet     hydroxychloroquine (PLAQUENIL) 200 MG tablet     insulin aspart (NOVOLOG FLEXPEN) 100 UNIT/ML pen     insulin glargine (BASAGLAR KWIKPEN) 100 UNIT/ML pen     lidocaine (LIDODERM) 5 % patch     loratadine (CLARITIN) 10 MG tablet     metoprolol succinate ER (TOPROL-XL) 25 MG 24 hr tablet     metoprolol succinate ER (TOPROL-XL) 50 MG 24 hr tablet     montelukast (SINGULAIR) 10 MG tablet     OZEMPIC, 1 MG/DOSE, 2 MG/1.5ML pen     potassium chloride ER (KLOR-CON) 20 MEQ CR tablet     predniSONE (DELTASONE) 5 MG tablet     rivaroxaban ANTICOAGULANT (XARELTO ANTICOAGULANT) 20 MG TABS tablet     spironolactone (ALDACTONE) 25 MG tablet     torsemide (DEMADEX) 10 MG tablet     torsemide (DEMADEX) 20 MG tablet     traZODone (DESYREL) 50 MG tablet     vitamin D3 (CHOLECALCIFEROL) 50 mcg (2000 units) tablet     No current facility-administered  medications for this visit.      Allergies   Allergen Reactions     Amoxicillin-Pot Clavulanate      Augmentin Nausea and Vomiting     Codeine Nausea and Vomiting     Codeine      PN: LW Reaction: HIVES     Penicillins Nausea and Vomiting     PN: LW Reaction: GI Upset     Phenobarbital Itching     Phenobarbital      Seasonal Allergies      Past Medical History:   Diagnosis Date     Alcohol abuse, in remission      Allergic rhinitis, cause unspecified     allegra helps when she takes it     Antiplatelet or antithrombotic long-term use      Atrial fibrillation (H)     in hosp in 11/11 after surgery w/ fluid overload     Cardiomegaly     LVH on stress echo- cardiac w/u at [facility] ER- neg CT scan for PE, neg stress echo in 8/06     Chest pain, unspecified      Disorder of bone and cartilage, unspecified     osteopenia (had been on prempro), improved on 6/06 dexa, stable dexa 11/10     Diverticulosis of colon (without mention of hemorrhage)     last episode yrs ago     Essential hypertension, benign      Follicular bronchiolitis (H)     associated with Sjogrens, dx by chest CT showing mosaic attenuation and air trapping     Gastro-oesophageal reflux disease      ILD (interstitial lung disease) (H)     associated with Sjogrens, also has mildly elevated IgG4, first noted on chest CT 2015 (mild changes) and also has small airways disease; ILD improved on follow up chest CT 2018.     Insomnia, unspecified     weaned off clonazepam     Irregular heart beat      Lumbago 7/09    MRI with NEAL, now seeing Dr. Cain for sciatic sx's     Major depressive disorder, recurrent episode, moderate (H)      Obstructive sleep apnea     uses cpap     Osteoarthrosis, unspecified whether generalized or localized, unspecified site      Sjogren's syndrome (H)     + RG and SSA and lip bx     Sleep apnea      Tobacco use disorder     chantix in 9/07, started again in 6/08, working       Past Surgical History:   Procedure Laterality Date     BACK  SURGERY       BIOPSY BREAST  02    Biopsy Left Breast     C APPENDECTOMY  's?     CARDIAC SURGERY       CHOLECYSTECTOMY  's?     COLECTOMY LEFT  2011    Procedure:COLECTOMY LEFT; Laparoscopic mobilization of splenic flexture, sigmoid colectomy, coloprotoscopy, loop illeostomy; Surgeon:CK CASTANEDA; Location:[facility] OR     HYSTERECTOMY TOTAL ABDOMINAL, BILATERAL SALPINGO-OOPHORECTOMY, COMBINED  2011    Procedure:COMBINED HYSTERECTOMY TOTAL ABDOMINAL, BILATERAL SALPINGO-OOPHORECTOMY; total abdominal hysterectomy, bilateral salpingo-oophorectomy; Surgeon:ALETA MANUEL; Location:[facility] OR     INSERT STENT URETER  2011    Procedure:INSERT STENT URETER; Placement of Bilateral Ureteral Stents ; Surgeon:PRANEETH BRYANT; Location:[facility]     SIGMOIDOSCOPY FLEXIBLE  11/3/2011    Procedure:SIGMOIDOSCOPY FLEXIBLE; Flexible Sigmoidoscopy; Surgeon:CK CASTANEDA; Location:[facility]     TAKEDOWN ILEOSTOMY  2012    Procedure:TAKEDOWN ILEOSTOMY; Takedown Loop Ileostomy ; Surgeon:CK CASTANEDA; Location:[facility] OR     ZZC NONSPECIFIC PROCEDURE      exploratory abd lap, adhesions, resolved RLQ pain, diverticulitis episodes       Social History     Socioeconomic History     Marital status: Single     Spouse name: Not on file     Number of children: 0     Years of education: Ed Spec De     Highest education level: Not on file   Occupational History     Occupation: Professor     Employer: [employer]     Comment: [employer]- Education   Social Needs     Financial resource strain: Not on file     Food insecurity     Worry: Not on file     Inability: Not on file     Transportation needs     Medical: Not on file     Non-medical: Not on file   Tobacco Use     Smoking status: Former Smoker     Packs/day: 0.50     Years: 10.00     Pack years: 5.00     Types: Cigarettes     Quit date: 2011     Years since quittin.7     Smokeless tobacco: Never Used     Tobacco comment:  1/2 ppd   Substance and Sexual Activity     Alcohol use: No     Alcohol/week: 0.0 standard drinks     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     Drug use: No     Sexual activity: Never   Lifestyle     Physical activity     Days per week: Not on file     Minutes per session: Not on file     Stress: Not on file   Relationships     Social connections     Talks on phone: Not on file     Gets together: Not on file     Attends [employer] service: Not on file     Active member of club or organization: Not on file     Attends meetings of clubs or organizations: Not on file     Relationship status: Not on file     Intimate partner violence     Fear of current or ex partner: Not on file     Emotionally abused: Not on file     Physically abused: Not on file     Forced sexual activity: Not on file   Other Topics Concern     Parent/sibling w/ CABG, MI or angioplasty before 65F 55M? Not Asked   Social History Narrative                   Family History   Problem Relation Age of Onset     C.A.D. Mother 63        MI- first at age 63     Heart Disease Mother      Hypertension Mother      Cerebrovascular Disease Mother      Hyperlipidemia Mother      Alcohol/Drug Father      Alzheimer Disease Father      Dementia Father      Hypertension Father      Hyperlipidemia Father      Diabetes Sister      C.A.D. Sister 52        Minor MI- age 50's     Heart Disease Sister      Hypertension Sister      Hypertension Sister      Hypertension Brother      Cancer - colorectal Sister 48        Late 40's early 50's     Prostate Cancer Brother 74        Dx'd age 74     Gastrointestinal Disease Sister         Diverticulitis     Gastrointestinal Disease Brother         Diverticulitis     Lipids Sister      Lipids Sister      Parkinsonism Brother      Diabetes Sister      Heart Disease Sister         CHF     Cancer Sister         lung, smoker     Substance Abuse Sister      Substance Abuse Brother      Asthma Sister      Cancer Sister      Breast Cancer  "Daughter      Prostate Cancer Brother      Hyperlipidemia Brother      Diabetes Other      Hypertension Other          Exam:   GENERAL: Healthy, alert and no distress\",\"EYES: Eyes grossly normal to inspection.  No discharge or erythema, or obvious scleral/conjunctival abnormalities.\",\"RESP: No audible wheeze, cough, or visible cyanosis.  No visible retractions or increased work of breathing.  \",\"SKIN: Visible skin clear. No significant rash, abnormal pigmentation or lesions.\",\"NEURO: Cranial nerves grossly intact.  Mentation and speech appropriate for age.\",\"PSYCH: Mentation appears normal, affect normal/bright, judgement and insight intact, normal speech and appearance well-groomed.        Assessment and plan:  Sister Betty is an 80-year-old who has Sjogren's follicular bronchiolitis and history of ILD with whom I had a video visit today.    1.  Sjogren's with follicular bronchiolitis and history of ILD.  ILD improved on follow-up chest CT scan in 2018, but she has persistent changes of follicular bronchiolitis.  I encouraged her to continue with physical therapy.  We will continue prednisone 5 mg daily, azithromycin 250 mg daily, and Breo Ellipta inhaler as anti-inflammatory therapy for the follicular bronchiolitis.  She should get the flu vaccine in the fall.  She should get the COVID-19 booster vaccine if and when it becomes available.  I will see her in 6 months with full PFT.  2.  Medication monitoring.  She had a follow-up EKG last year.            "

## 2021-04-16 NOTE — LETTER
"    4/16/2021         RE: Betty Tee  [address]  [facility] [address]        Dear Colleague,    Thank you for referring your patient, Betty Tee, to the [facility] AND [facility]. Please see a copy of my visit note below.    Sister Betty is a 80 year old who is being evaluated via a billable video visit.      How would you like to obtain your AVS? Mail a copy  If the video visit is dropped, the invitation should be resent by: Other e-mail: Snapverse  Will anyone else be joining your video visit? No      Video Start Time:  11:37am  Video-Visit Details    Type of service:  Video Visit    Video End Time: 11:50am    Originating Location (pt. Location): Home    Distant Location (provider location):  [facility]     Platform used for Video Visit: [facility]    Reason for Visit  Betty Tee is a 80 year old year old female who is being seen for No chief complaint on file.    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  She had mild ILD on a previous chest CT scan in 2015, but follow-up chest CT scan in 2018 showed improvement with no obvious ILD present on the CT scan.  The follicular bronchiolitis changes were still present.  She has been on prednisone for her Sjogren's for approximately 5 years, and that is managed by Dr. Webster in rheumatology.  Her current prednisone dose is 5 mg daily.    Today, she reports that her breathing is \"pretty good.\"  She does experience some dyspnea on exertion when she walks up a flight of stairs, and that is unchanged.  Her activity level is limited by hip and back pain.  She does use a walker.  She started physical therapy today and is hoping that she will be able to walk without a walker.  She " denies cough, wheezing, or fevers.  She denies paroxysmal nocturnal dyspnea and does wear CPAP at nighttime.  She does endorse sneezing secondary to seasonal allergies, for which she takes Claritin and Singulair.  Her medications for her follicular bronchiolitis currently include prednisone 5 mg daily, azithromycin 250 mg daily and Breo Ellipta inhaler, which are all anti-inflammatories for her follicular bronchiolitis.  She reports that she sometimes forgets to use the Breo Ellipta inhaler.    She did receive the Pfizer COVID-19 vaccine.        Current Outpatient Medications   Medication     acetaminophen (TYLENOL) 500 MG tablet     acyclovir (ZOVIRAX) 400 MG tablet     acyclovir (ZOVIRAX) 5 % external ointment     albuterol (PROAIR HFA, PROVENTIL HFA, VENTOLIN HFA) 108 (90 BASE) MCG/ACT inhaler     alendronate (FOSAMAX) 70 MG tablet     anakinra (KINERET) 100 MG/0.67ML SOSY injection     atorvastatin (LIPITOR) 10 MG tablet     azithromycin (ZITHROMAX) 250 MG tablet     BD JANE U/F 32G X 4 MM insulin pen needle     cephALEXin (KEFLEX) 250 MG capsule     CEVIMELINE 30 MG PO capsule     escitalopram (LEXAPRO) 20 MG tablet     famotidine (PEPCID) 10 MG tablet     fluticasone (FLONASE) 50 MCG/ACT nasal spray     fluticasone-vilanterol (BREO ELLIPTA) 100-25 MCG/INH inhaler     gabapentin (NEURONTIN) 300 MG capsule     HYDROcodone-acetaminophen (NORCO) 7.5-325 MG per tablet     hydroxychloroquine (PLAQUENIL) 200 MG tablet     insulin aspart (NOVOLOG FLEXPEN) 100 UNIT/ML pen     insulin glargine (BASAGLAR KWIKPEN) 100 UNIT/ML pen     lidocaine (LIDODERM) 5 % patch     loratadine (CLARITIN) 10 MG tablet     metoprolol succinate ER (TOPROL-XL) 25 MG 24 hr tablet     metoprolol succinate ER (TOPROL-XL) 50 MG 24 hr tablet     montelukast (SINGULAIR) 10 MG tablet     OZEMPIC, 1 MG/DOSE, 2 MG/1.5ML pen     potassium chloride ER (KLOR-CON) 20 MEQ CR tablet     predniSONE (DELTASONE) 5 MG tablet     rivaroxaban ANTICOAGULANT  (XARELTO ANTICOAGULANT) 20 MG TABS tablet     spironolactone (ALDACTONE) 25 MG tablet     torsemide (DEMADEX) 10 MG tablet     torsemide (DEMADEX) 20 MG tablet     traZODone (DESYREL) 50 MG tablet     vitamin D3 (CHOLECALCIFEROL) 50 mcg (2000 units) tablet     No current facility-administered medications for this visit.      Allergies   Allergen Reactions     Amoxicillin-Pot Clavulanate      Augmentin Nausea and Vomiting     Codeine Nausea and Vomiting     Codeine      PN: LW Reaction: HIVES     Penicillins Nausea and Vomiting     PN: LW Reaction: GI Upset     Phenobarbital Itching     Phenobarbital      Seasonal Allergies      Past Medical History:   Diagnosis Date     Alcohol abuse, in remission      Allergic rhinitis, cause unspecified     allegra helps when she takes it     Antiplatelet or antithrombotic long-term use      Atrial fibrillation (H)     in hosp in 11/11 after surgery w/ fluid overload     Cardiomegaly     LVH on stress echo- cardiac w/u at [facility] ER- neg CT scan for PE, neg stress echo in 8/06     Chest pain, unspecified      Disorder of bone and cartilage, unspecified     osteopenia (had been on prempro), improved on 6/06 dexa, stable dexa 11/10     Diverticulosis of colon (without mention of hemorrhage)     last episode yrs ago     Essential hypertension, benign      Follicular bronchiolitis (H)     associated with Sjogrens, dx by chest CT showing mosaic attenuation and air trapping     Gastro-oesophageal reflux disease      ILD (interstitial lung disease) (H)     associated with Sjogrens, also has mildly elevated IgG4, first noted on chest CT 2015 (mild changes) and also has small airways disease; ILD improved on follow up chest CT 2018.     Insomnia, unspecified     weaned off clonazepam     Irregular heart beat      Lumbago 7/09    MRI with NEAL, now seeing Dr. Cain for sciatic sx's     Major depressive disorder, recurrent episode, moderate (H)      Obstructive sleep apnea     uses cpap      Osteoarthrosis, unspecified whether generalized or localized, unspecified site      Sjogren's syndrome (H)     + RG and SSA and lip bx     Sleep apnea      Tobacco use disorder     chantix in 9/07, started again in 6/08, working       Past Surgical History:   Procedure Laterality Date     BACK SURGERY  1962     BIOPSY BREAST  9/27/02    Biopsy Left Breast     C APPENDECTOMY  1970's?     CARDIAC SURGERY       CHOLECYSTECTOMY  1990's?     COLECTOMY LEFT  11/7/2011    Procedure:COLECTOMY LEFT; Laparoscopic mobilization of splenic flexture, sigmoid colectomy, coloprotoscopy, loop illeostomy; Surgeon:CK CASTANEDA; Location:[facility] OR     HYSTERECTOMY TOTAL ABDOMINAL, BILATERAL SALPINGO-OOPHORECTOMY, COMBINED  11/7/2011    Procedure:COMBINED HYSTERECTOMY TOTAL ABDOMINAL, BILATERAL SALPINGO-OOPHORECTOMY; total abdominal hysterectomy, bilateral salpingo-oophorectomy; Surgeon:ALETA MANUEL; Location:[facility] OR     INSERT STENT URETER  11/7/2011    Procedure:INSERT STENT URETER; Placement of Bilateral Ureteral Stents ; Surgeon:PRANEETH BRYANT; Location:[facility] OR     SIGMOIDOSCOPY FLEXIBLE  11/3/2011    Procedure:SIGMOIDOSCOPY FLEXIBLE; Flexible Sigmoidoscopy; Surgeon:CK CASTANEDA; Location[facility]     TAKEDOWN ILEOSTOMY  2/1/2012    Procedure:TAKEDOWN ILEOSTOMY; Takedown Loop Ileostomy ; Surgeon:CK CASTANEDA; Location:[facility] OR     ZZC NONSPECIFIC PROCEDURE  11/05    exploratory abd lap, adhesions, resolved RLQ pain, diverticulitis episodes       Social History     Socioeconomic History     Marital status: Single     Spouse name: Not on file     Number of children: 0     Years of education: Ed Spec De     Highest education level: Not on file   Occupational History     Occupation: Professor     Employer: [employer]     Comment: [employer]- Education   Social Needs     Financial resource strain: Not on file     Food insecurity     Worry: Not on file     Inability: Not on file     Transportation  needs     Medical: Not on file     Non-medical: Not on file   Tobacco Use     Smoking status: Former Smoker     Packs/day: 0.50     Years: 10.00     Pack years: 5.00     Types: Cigarettes     Quit date: 2011     Years since quittin.7     Smokeless tobacco: Never Used     Tobacco comment:  ppd   Substance and Sexual Activity     Alcohol use: No     Alcohol/week: 0.0 standard drinks     Comment: In recovery beginning      Drug use: No     Sexual activity: Never   Lifestyle     Physical activity     Days per week: Not on file     Minutes per session: Not on file     Stress: Not on file   Relationships     Social connections     Talks on phone: Not on file     Gets together: Not on file     Attends Mu-ism service: Not on file     Active member of club or organization: Not on file     Attends meetings of clubs or organizations: Not on file     Relationship status: Not on file     Intimate partner violence     Fear of current or ex partner: Not on file     Emotionally abused: Not on file     Physically abused: Not on file     Forced sexual activity: Not on file   Other Topics Concern     Parent/sibling w/ CABG, MI or angioplasty before 65F 55M? Not Asked   Social History Narrative                   Family History   Problem Relation Age of Onset     C.A.D. Mother 63        MI- first at age 63     Heart Disease Mother      Hypertension Mother      Cerebrovascular Disease Mother      Hyperlipidemia Mother      Alcohol/Drug Father      Alzheimer Disease Father      Dementia Father      Hypertension Father      Hyperlipidemia Father      Diabetes Sister      C.A.D. Sister 52        Minor MI- age 50's     Heart Disease Sister      Hypertension Sister      Hypertension Sister      Hypertension Brother      Cancer - colorectal Sister 48        Late 40's early 50's     Prostate Cancer Brother 74        Dx'd age 74     Gastrointestinal Disease Sister         Diverticulitis     Gastrointestinal Disease Brother   "       Diverticulitis     Lipids Sister      Lipids Sister      Parkinsonism Brother      Diabetes Sister      Heart Disease Sister         CHF     Cancer Sister         lung, smoker     Substance Abuse Sister      Substance Abuse Brother      Asthma Sister      Cancer Sister      Breast Cancer Daughter      Prostate Cancer Brother      Hyperlipidemia Brother      Diabetes Other      Hypertension Other          Exam:   GENERAL: Healthy, alert and no distress\",\"EYES: Eyes grossly normal to inspection.  No discharge or erythema, or obvious scleral/conjunctival abnormalities.\",\"RESP: No audible wheeze, cough, or visible cyanosis.  No visible retractions or increased work of breathing.  \",\"SKIN: Visible skin clear. No significant rash, abnormal pigmentation or lesions.\",\"NEURO: Cranial nerves grossly intact.  Mentation and speech appropriate for age.\",\"PSYCH: Mentation appears normal, affect normal/bright, judgement and insight intact, normal speech and appearance well-groomed.        Assessment and plan:  Sister Betty is an 80-year-old who has Sjogren's follicular bronchiolitis and history of ILD with whom I had a video visit today.    1.  Sjogren's with follicular bronchiolitis and history of ILD.  ILD improved on follow-up chest CT scan in 2018, but she has persistent changes of follicular bronchiolitis.  I encouraged her to continue with physical therapy.  We will continue prednisone 5 mg daily, azithromycin 250 mg daily, and Breo Ellipta inhaler as anti-inflammatory therapy for the follicular bronchiolitis.  She should get the flu vaccine in the fall.  She should get the COVID-19 booster vaccine if and when it becomes available.  I will see her in 6 months with full PFT.  2.  Medication monitoring.  She had a follow-up EKG last yea    Sincerely,    Maryjane Tillman MD    "

## 2021-04-21 ENCOUNTER — TELEPHONE (OUTPATIENT)
Dept: RHEUMATOLOGY | Facility: CLINIC | Age: 81
End: 2021-04-21

## 2021-04-21 ENCOUNTER — TELEPHONE (OUTPATIENT)
Dept: PALLIATIVE MEDICINE | Facility: CLINIC | Age: 81
End: 2021-04-21

## 2021-04-21 DIAGNOSIS — G89.29 OTHER CHRONIC PAIN: ICD-10-CM

## 2021-04-21 DIAGNOSIS — J44.89 FOLLICULAR BRONCHIOLITIS (H): ICD-10-CM

## 2021-04-21 DIAGNOSIS — M16.11 OSTEOARTHRITIS OF RIGHT HIP, UNSPECIFIED OSTEOARTHRITIS TYPE: ICD-10-CM

## 2021-04-21 RX ORDER — AZITHROMYCIN 250 MG/1
250 TABLET, FILM COATED ORAL DAILY
Qty: 30 TABLET | Refills: 11 | Status: SHIPPED | OUTPATIENT
Start: 2021-04-21 | End: 2022-05-11

## 2021-04-21 NOTE — TELEPHONE ENCOUNTER
Pt is seeing pain management and is getting injections, but is continuing to have pain in her right buttock down into the foot.  She said the last injection did help some.      She is wanting to stick to one doctor for medications.  She said 1 pill a day usually is enough to get her through the day.      She is wondering if she can get a refill of the hydrocodone. She has been using it with the tramadol, she will take that during the day some times.         Kamille Ruffin RN  Rheumatology Clinic

## 2021-04-21 NOTE — TELEPHONE ENCOUNTER
Received faxed form from [employer] [facility] [employer].      Zayra Christy MA  [facility] [place]

## 2021-04-22 ENCOUNTER — THERAPY VISIT (OUTPATIENT)
Dept: PHYSICAL THERAPY | Facility: CLINIC | Age: 81
End: 2021-04-22
Payer: MEDICARE

## 2021-04-22 DIAGNOSIS — M25.551 HIP PAIN, RIGHT: Primary | ICD-10-CM

## 2021-04-22 PROCEDURE — 97110 THERAPEUTIC EXERCISES: CPT | Mod: GP | Performed by: PHYSICAL THERAPIST

## 2021-04-22 PROCEDURE — 97140 MANUAL THERAPY 1/> REGIONS: CPT | Mod: GP | Performed by: PHYSICAL THERAPIST

## 2021-04-22 RX ORDER — HYDROCODONE BITARTRATE AND ACETAMINOPHEN 7.5; 325 MG/1; MG/1
1 TABLET ORAL EVERY 12 HOURS
Qty: 60 TABLET | Refills: 0 | Status: SHIPPED | OUTPATIENT
Start: 2021-04-22 | End: 2021-06-18

## 2021-04-27 ENCOUNTER — VIRTUAL VISIT (OUTPATIENT)
Dept: PALLIATIVE MEDICINE | Facility: CLINIC | Age: 81
End: 2021-04-27
Payer: MEDICARE

## 2021-04-27 DIAGNOSIS — M47.816 SPONDYLOSIS OF LUMBAR REGION WITHOUT MYELOPATHY OR RADICULOPATHY: ICD-10-CM

## 2021-04-27 DIAGNOSIS — M35.02 SJOGREN'S SYNDROME WITH LUNG INVOLVEMENT (H): ICD-10-CM

## 2021-04-27 DIAGNOSIS — Z79.899 LONG-TERM USE OF PLAQUENIL: Primary | ICD-10-CM

## 2021-04-27 DIAGNOSIS — G57.01 PIRIFORMIS SYNDROME, RIGHT: Primary | ICD-10-CM

## 2021-04-27 PROCEDURE — 99213 OFFICE O/P EST LOW 20 MIN: CPT | Mod: 95 | Performed by: PHYSICAL MEDICINE & REHABILITATION

## 2021-04-27 RX ORDER — METHOCARBAMOL 750 MG/1
750 TABLET, FILM COATED ORAL 2 TIMES DAILY PRN
Qty: 60 TABLET | Refills: 0 | Status: SHIPPED | OUTPATIENT
Start: 2021-04-27 | End: 2021-07-23

## 2021-04-27 ASSESSMENT — PAIN SCALES - GENERAL: PAINLEVEL: MODERATE PAIN (4)

## 2021-04-27 NOTE — PATIENT INSTRUCTIONS
1. Start methocarbamol 750mg twice daily as needed.    2. Reduce gabapentin to 300mg BID for 3 days, then 300mg once dialy for 3 days then stop.    3. Call the number below to schedule a virtual visit in about 6 weeks.        Danuta gutierrez,    DO [facility]        ----------------------------------------------------------------  Clinic Number:  [phone]     Call with any questions about your care and for scheduling assistance.     Calls are returned Monday through Friday between 8 AM and 4:30 PM. We usually get back to you within 2 business days depending on the issue/request.    If we are prescribing your medications:    For opioid medication refills, call the clinic or send a Digicompanion message 7 days in advance.  Please include:    Name of requested medication    Name of the pharmacy.    For non-opioid medications, call your pharmacy directly to request a refill. Please allow 3-4 days to be processed.     Per [place] State Law:    All controlled substance prescriptions must be filled within 30 days of being written.      For those controlled substances allowing refills, pickup must occur within 30 days of last fill.      We believe regular attendance is key to your success in our program!      Any time you are unable to keep your appointment we ask that you call us at least 24 hours in advance to cancel.This will allow us to offer the appointment time to another patient.     Multiple missed appointments may lead to dismissal from the clinic.

## 2021-04-27 NOTE — PROGRESS NOTES
Sister Betty is a 80 year old who is being evaluated via a billable video visit.      How would you like to obtain your AVS? Mail a copy  If the video visit is dropped, the invitation should be resent by: Other e-mail: Cehl  Will anyone else be joining your video visit? Yes: Nghia ALLEN (healthcare agent) and Sister Yvette. How would they like to receive their invitation? Other e-mail: RENEE Katz [facility]                                [facility]    Date of visit: 4/27/21       Assessment:  Sister Betty Buckner is a 80 year old medically complex patient with past medical history including: Atrial fibrillation, VLH, History of DVT, CHF, HLD, HTN, DM 2, Stage 3 CKD, RLS, Depression, History of etoh abuse (remission 30+ years) who presents for evaluation and treatment of the following chronic pain conditions:     1. Right sided hip and leg pain: Patient describes a long standing history of right hip and leg pain with exacerbation in the past year. She reports pain that starts in her right low back/upper buttock and radiates laterally and posteriorly down the right leg to the foot. MRI was independently reviewed and concerning for right S1 nerve root abutment /impingement, no improvement noted with a S1 liliya. Due to ongoing buttock, hip and right leg radiating pain we tried a piriformis injection which resulted in about 60% improvement in her symptoms. Based on this it appears her symptoms are due to a combination of lumbar spondylosis, right gluteal dysfunction and piriformis syndrome.            Plan:  The following recommendations were given to the patient. Diagnosis, treatment options, risks, benefits, and alternatives were discussed, and all questions were answered. The patient expressed understanding of the plan for management.      I am recommending a multidisciplinary treatment plan to help this patient better manage her pain.   This includes:      1. Physical Therapy: Continue PT.  2. Clinical Health Pain Psychologist: Coping well, baseline memory issues, likely to be of limited benefit.  3. Diagnostic Studies: None at this time.  4. Medication Management:   1. Reduce gabapentin to 300mg BID for 3 days, then 300mg once dialy for 3 days then stop.  2. Continue Norco half tab as needed for severe pain.  3. Start methocarbamol 750mg twice daily as needed for cramping buttock/hip pain.  4. Continue tylenol 1000mg TID prn.  5. Further procedures recommended: right piriformis injection can be repeated every 3+ months as needed.  6. Follow up: 6 week virtual visit.      DO [facility]           Chief complaint:   No chief complaint on file.      Interval history:  Betty Tee is a 80 year old female last seen by me on 3/22/21.        Since her last visit, Betty Tee reports:  -She had a right piriformis steroid injection on 4/1/21 with some improvement in her pain.    -She has less burning pain in the leg and foot.  She thinks she has had about 60% relief in her pain. The procedure continues to be about 60% effective.    -She still has catching sharp pain in her buttocks intermittently. This has happened about 4-6 times over the past month. When the spasms happen, her pain goes up to a 7/10, otherwise she is at a 3-4/10 and characterizes the pain as an ache.    -She has Norco 7.5-325mg available but is hesitant to use this mediation due to concern for addiction.      Pain scores:  Pain intensity on average is 4 on a scale of 0-10.        Pain Treatments:  1. Medications:       Current pain medications:  -Tylenol 1000mg q8h prn  -Escitalopram 20mg daily       Previous pain medications:  -Tramadol 50mg prn  -Norco 5-325mg prn  -Tizanidien, flexeril - nh   -Gabapentin 300mg TID - memory difficulty  2. Physical Therapy: hasn't completed PT for low back or hip pain                TENS unit: hasn't  tried  3. Pain psychology: hasn't tried  4. Surgery: Lumbar spine surgery in the 1960s.  5. Injections:   -Right piriformis injection on 4/1/21 with 60% relief to date.  -Two epidural injections (right L5 tfesi), feb 2020 was helpful the next day but she had a fall right after, aug 2020 procedure - not helpful, had worsening pain  6. Alternative Therapies:               Chiropractic: hasn't tried               Acupuncture: hasn't tried          Side Effects: no side effect    Medications:  Current Outpatient Medications   Medication Sig Dispense Refill     acetaminophen (TYLENOL) 500 MG tablet Take 1,000 mg by mouth every 8 hours as needed (max 6 tablets/24 hours, 2 tablets/dose)        acyclovir (ZOVIRAX) 400 MG tablet TAKE 1 TABLET (400 MG) BY MOUTH 3 TIMES DAILY FOR A COUPLE DAYS 15 tablet 2     acyclovir (ZOVIRAX) 5 % external ointment Apply topically 6 times daily As needed for outbreaks 15 g 3     albuterol (PROAIR HFA, PROVENTIL HFA, VENTOLIN HFA) 108 (90 BASE) MCG/ACT inhaler Inhale 2 puffs into the lungs every 6 hours 1 Inhaler 3     alendronate (FOSAMAX) 70 MG tablet Take 1 tablet (70 mg) by mouth every 7 days 12 tablet 1     anakinra (KINERET) 100 MG/0.67ML SOSY injection 100 mg every day x 3 days as needed for flare ups 6.7 mL 3     atorvastatin (LIPITOR) 10 MG tablet TAKE 1/2 TABLET BY MOUTH EVERY DAY 45 tablet 3     azithromycin (ZITHROMAX) 250 MG tablet Take 1 tablet (250 mg) by mouth daily 30 tablet 11     BD JANE U/F 32G X 4 MM insulin pen needle USE 4 DAILY AS DIRECTED. 400 each 0     cephALEXin (KEFLEX) 250 MG capsule Take 1 capsule (250 mg) by mouth every 6 hours 20 capsule 0     CEVIMELINE 30 MG PO capsule TAKE 1 CAPSULE (30 MG) BY MOUTH 3 TIMES DAILY (Patient taking differently: Take 30 mg by mouth every other day ) 270 capsule 2     escitalopram (LEXAPRO) 20 MG tablet TAKE 1 TABLET BY MOUTH EVERY DAY 90 tablet 0     famotidine (PEPCID) 10 MG tablet Take 10 mg by mouth 2 times daily        fluticasone (FLONASE) 50 MCG/ACT nasal spray Spray 1-2 sprays into both nostrils daily as needed for allergies 18.2 mL 11     fluticasone-vilanterol (BREO ELLIPTA) 100-25 MCG/INH inhaler Inhale 1 puff into the lungs daily 1 Inhaler 11     gabapentin (NEURONTIN) 300 MG capsule Take 1 capsule (300 mg) by mouth 3 times daily 270 capsule 0     HYDROcodone-acetaminophen (NORCO) 7.5-325 MG per tablet Take 1 tablet by mouth every 12 hours 60 tablet 0     hydroxychloroquine (PLAQUENIL) 200 MG tablet TAKE 2 TABLETS (400 MG) BY MOUTH DAILY ANNUAL PLAQUENIL TOXICITY EYE SCREENING REQUIRED. 180 tablet 0     insulin aspart (NOVOLOG FLEXPEN) 100 UNIT/ML pen INJECT 12 UNITS SUBCUTANEOUS 3 TIMES DAILY (WITH MEALS) 15 mL 2     insulin glargine (BASAGLAR KWIKPEN) 100 UNIT/ML pen INJECT 22 UNITS SUBCUTANEOUS DAILY (TO REPLACE LANTUS) 15 mL 1     lidocaine (LIDODERM) 5 % patch APPLY PATCH TO PAINFUL AREA FOR UP TO 12 H WITHIN A 24 H PERIOD. REMOVE AFTER 12 HOURS. 30 patch 2     loratadine (CLARITIN) 10 MG tablet TAKE 1 TABLET BY MOUTH EVERY DAY 90 tablet 1     metoprolol succinate ER (TOPROL-XL) 25 MG 24 hr tablet TAKE 1/2 TABLET BY MOUTH 2 TIMES DAILY WITH 50 MG FOR A TOTAL OF 62.5 TWICE A DAY 90 tablet 1     metoprolol succinate ER (TOPROL-XL) 50 MG 24 hr tablet Take 50 mg by mouth in combination with 12.5 for a total of 62.5 twice a day 180 tablet 1     montelukast (SINGULAIR) 10 MG tablet TAKE 1 TABLET BY MOUTH EVERYDAY AT BEDTIME 90 tablet 0     OZEMPIC, 1 MG/DOSE, 2 MG/1.5ML pen INJECT 1 MG SUBCUTANEOUS EVERY 7 DAYS 3 mL 1     potassium chloride ER (KLOR-CON) 20 MEQ CR tablet Take 2 tablets (40 mEq) by mouth every morning AND 1 tablet (20 mEq) every evening. 270 tablet 3     predniSONE (DELTASONE) 5 MG tablet Take 1 tablet (5 mg) by mouth daily 30 tablet 0     rivaroxaban ANTICOAGULANT (XARELTO ANTICOAGULANT) 20 MG TABS tablet Take 1 tablet (20 mg) by mouth daily (with dinner) 90 tablet 2     spironolactone (ALDACTONE) 25 MG  tablet Take 2 tablets (50 mg) by mouth daily 180 tablet 3     torsemide (DEMADEX) 10 MG tablet TAKE ONE TAB (10 MG) WITH ONE 20 MG TAB TO = 30 MG DAILY IN AFTERNOON. 90 tablet 0     torsemide (DEMADEX) 20 MG tablet TAKE 2 TABS (40 MG) IN AM DAILY AND TAKE 1 TAB BY MOUTH IN THE AFTERNOON ALONG W/ A 10 MG  tablet 4     traZODone (DESYREL) 50 MG tablet TAKE 0.5-1.5 TABLETS (25-75 MG) BY MOUTH NIGHTLY AS NEEDED FOR SLEEP 135 tablet 0     vitamin D3 (CHOLECALCIFEROL) 50 mcg (2000 units) tablet Take 1 tablet (50 mcg) by mouth daily 90 tablet 2       Medical History: any changes in medical history since they were last seen? No    Review of Systems:  The 14 system ROS was reviewed from the intake questionnaire, and is positive for: edema, diabetes, gout, back pain, arthritis, paresthesias  Any bowel or bladder problems: frequency, urgency  Mood: denies        Video Start Time: 12:52 PM  Video-Visit Details    Type of service:  Video Visit    Video End Time:1:08 PM    Originating Location (pt. Location): Home    Distant Location (provider location):  [facility] [place]     Platform used for Video Visit: [employer]      BILLING TIME DOCUMENTATION:   The total TIME spent on this patient on the date of the encounter/appointment was 21 minutes.      TOTAL TIME includes:   Time spent preparing to see the patient (reviewing records and tests)   Time spent face to face (or over the phone) with the patient   Time spent ordering tests, medications, procedures and referrals   Time spent documenting clinical information in Epic         Charly Moore  [facility]

## 2021-04-28 RX ORDER — HYDROXYCHLOROQUINE SULFATE 200 MG/1
400 TABLET, FILM COATED ORAL DAILY
Qty: 180 TABLET | Refills: 1 | Status: SHIPPED | OUTPATIENT
Start: 2021-04-28 | End: 2021-10-20

## 2021-04-28 NOTE — TELEPHONE ENCOUNTER
Received call yesterday requesting an update on her refill.  She says she contacted pharmacy 2 weeks ago, we received refill request yesterday.  Review of chart does show that she had eye exam in March.      Called pt to discuss refill, she is good until next March for eye exam.  Will refill HCQ prescription per protocol.    Kamille Ruffin RN  Rheumatology Clinic

## 2021-04-28 NOTE — TELEPHONE ENCOUNTER
Zulma Lopez MD Beard, Madeline, RN   Caller: Unspecified (Yesterday,  1:40 PM)             She has an in person visit tomorrow, could we set her up for 12 lead ECG? Her QT could get prolonged on combination of azithromycin+HCQ     Pt is being seen at [facility]. They do not do EKG's there.     Have left message requesting pt return call to find the closest clinic for her.    Kamille Ruffin RN  Rheumatology Clinic

## 2021-04-29 ENCOUNTER — THERAPY VISIT (OUTPATIENT)
Dept: PHYSICAL THERAPY | Facility: CLINIC | Age: 81
End: 2021-04-29
Payer: MEDICARE

## 2021-04-29 DIAGNOSIS — M25.551 HIP PAIN, RIGHT: ICD-10-CM

## 2021-04-29 PROCEDURE — 97110 THERAPEUTIC EXERCISES: CPT | Mod: GP | Performed by: PHYSICAL THERAPIST

## 2021-04-29 PROCEDURE — 97112 NEUROMUSCULAR REEDUCATION: CPT | Mod: GP | Performed by: PHYSICAL THERAPIST

## 2021-04-29 PROCEDURE — 97140 MANUAL THERAPY 1/> REGIONS: CPT | Mod: GP | Performed by: PHYSICAL THERAPIST

## 2021-04-30 NOTE — TELEPHONE ENCOUNTER
Pt called clinic to discuss what is needed, we discussed EKG, she would like to have it done at the [facility], have gotten her scheduled for Monday at 1110 am for the EKG. Pt is aware.    Kamille Ruffin RN  Rheumatology Clinic

## 2021-05-03 DIAGNOSIS — Z79.899 LONG-TERM USE OF PLAQUENIL: ICD-10-CM

## 2021-05-04 LAB — INTERPRETATION ECG - MUSE: NORMAL

## 2021-05-08 ENCOUNTER — HEALTH MAINTENANCE LETTER (OUTPATIENT)
Age: 81
End: 2021-05-08

## 2021-05-13 ENCOUNTER — TELEPHONE (OUTPATIENT)
Dept: PHARMACY | Facility: CLINIC | Age: 81
End: 2021-05-13

## 2021-05-13 NOTE — TELEPHONE ENCOUNTER
Sister Betty would like a call back if possible. Her blood sugar levels have been very weird lately, going from 80 to 120. She states that this is not normal to her and concerns her. She scheduled a f/u appt w/ you for 5/18 at 11:30, but would just like some sooner advice on what she should do. Call back number is

## 2021-05-13 NOTE — TELEPHONE ENCOUNTER
"Called patient back - her blood sugar today has been 103 in the AM, \"90 something\" around noon and yesterday was at 87. She felt shaky/sweaty at 87 but also notes that \"they took me off clonazepam and I thought it was just because of that\" but this looks like it was done years ago - a review of her chart indicates that Dr. Moore discontinued gabapentin on 4/27/21 and started methocarbamol.     She reports for the last 2 weeks her blood sugars have been lower, that she's lost her appetite (doesn't feel like cooking, which she previously enjoyed).     Plan:   No changes today - she has MTM follow-up scheduled for Tuesday 5/18.   Discussed hypoglycemia and treatment.  At this point, she's not vocalizing any blood sugars that are low enough to want to decrease her treatment (in fact, they are at goal for the first time in a long time).  Discussed treating as a low if she's feeling low as her body adjusts.   Briefly discussed CGM - as this may be helpful. She will think about this and we can discuss more at upcoming appointment.     Sabrina Meek, MarcosD, JAMES, BCACP  MTM Pharmacist, [facility]   "

## 2021-05-14 ENCOUNTER — THERAPY VISIT (OUTPATIENT)
Dept: PHYSICAL THERAPY | Facility: CLINIC | Age: 81
End: 2021-05-14
Payer: MEDICARE

## 2021-05-14 DIAGNOSIS — M25.551 HIP PAIN, RIGHT: ICD-10-CM

## 2021-05-14 PROCEDURE — 97140 MANUAL THERAPY 1/> REGIONS: CPT | Mod: GP | Performed by: PHYSICAL THERAPIST

## 2021-05-14 PROCEDURE — 97110 THERAPEUTIC EXERCISES: CPT | Mod: GP | Performed by: PHYSICAL THERAPIST

## 2021-05-16 DIAGNOSIS — M35.02 SJOGREN'S SYNDROME WITH LUNG INVOLVEMENT (H): ICD-10-CM

## 2021-05-18 RX ORDER — CEVIMELINE HYDROCHLORIDE 30 MG/1
30 CAPSULE ORAL 3 TIMES DAILY
Qty: 270 CAPSULE | Refills: 2 | Status: SHIPPED | OUTPATIENT
Start: 2021-05-18 | End: 2022-12-28

## 2021-05-19 DIAGNOSIS — Z79.52 CURRENT CHRONIC USE OF SYSTEMIC STEROIDS: ICD-10-CM

## 2021-05-19 DIAGNOSIS — M85.80 OSTEOPENIA, UNSPECIFIED LOCATION: ICD-10-CM

## 2021-05-20 RX ORDER — ALENDRONATE SODIUM 70 MG/1
70 TABLET ORAL
Qty: 12 TABLET | Refills: 2 | Status: SHIPPED | OUTPATIENT
Start: 2021-05-20 | End: 2022-02-16

## 2021-05-21 ENCOUNTER — VIRTUAL VISIT (OUTPATIENT)
Dept: RHEUMATOLOGY | Facility: CLINIC | Age: 81
End: 2021-05-21
Attending: INTERNAL MEDICINE
Payer: MEDICARE

## 2021-05-21 DIAGNOSIS — M35.3 POLYMYALGIA RHEUMATICA (H): ICD-10-CM

## 2021-05-21 DIAGNOSIS — M10.9 GOUT, UNSPECIFIED CAUSE, UNSPECIFIED CHRONICITY, UNSPECIFIED SITE: ICD-10-CM

## 2021-05-21 DIAGNOSIS — Z79.899 LONG-TERM USE OF PLAQUENIL: ICD-10-CM

## 2021-05-21 DIAGNOSIS — M06.00 SERONEGATIVE RHEUMATOID ARTHRITIS (H): Primary | ICD-10-CM

## 2021-05-21 PROCEDURE — 99214 OFFICE O/P EST MOD 30 MIN: CPT | Mod: 95 | Performed by: INTERNAL MEDICINE

## 2021-05-21 NOTE — PROGRESS NOTES
Sister Betty is a 81 year old who is being evaluated via a billable video visit.      How would you like to obtain your AVS? MyChart  If the video visit is dropped, the invitation should be resent by: Text to cell phone: [phone]  Will anyone else be joining your video visit? No    Video Start Time: 2:08 pm  Video-Visit Details    Type of service:  Video Visit    Video End Time: 2:24 pm    Originating Location (pt. Location): Home    Distant Location (provider location):  [facility] [place]     Platform used for Video Visit: [facility]       Rheumatology Clinic Virtual Visit Note  Zulma Lopez MD  DOS: 2021  Date of last visit: 2021    Name: Betty Tee  MRN: [id number]  Age: 81 year old  : 1940  Reason for visit: Follow up visit for R hip pain sec severe OA, PMR, presumed gout flare of L foot, primary Sjogren's syndrome    # Sjogren's syndrome since  with borderline +RG, +SSA, and lip biopsy focus score of 1. Ongoing dry mouth on evoxac qod  # Follicular bronchiolitis, prior mild ILD   # Osteopenia on DEXA 2019 with T score=-2.1 with decline in bone density on fosamax  # Chronic prednisone use  # DM  # A fib  # Severe R hip OA with upcoming local steroid inj on norco prn  # PMR stable on prednisone 5 mg every day  #Presumed gout flare in L foot started today      Assessment and Plan:    New Dx of PMR. Severe R hip pain is due to severe OA based on 2020 hip MRI. She prefers to avoid hip arthroplasty. Her inflammation markers improved on prednisone, PMR responded to prednisone, now is 5 mg qd. Recurrent presumed gout flare today (not crystal proven) responded to prednisone high dose.    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 Completed pulmonary rehab in 2019 where she was able to exercise without oxygen. GFR improved since last  visit.    On HCQ since 4/2019 with significant improvement of joint pain. No retinal toxicity on eye exam done 1/2021. Tolerates HCQ well.     Sister Betty recovered from flare of presumed gout (or pseudogout given previous nl SUA) over L foot. She responded to high dose prednisone (40-30-20-10 mg every day each for 3 days) in the past, now is on 5 mg every day.  Given her DM, osteopenia, highly recommend to start using anakinra prn for gout flares.    No L foot pain today, R hip pain has improved.      Plan:    Prednisone 5 mg a day    Norco as needed for pain  (she rarely takes it)    Cevimeline for dry mouth could be taken 3 times a day (she takes it every other day as does not like to take so many pills)    Anakinra 100 mg a day x 3 days as needed for gout flares    Follow up with Dr. See for R hip OA    Stay on fosamax weekly    Continue  mg bid    Yearly eye exam on HCQ, due 1/2022    Labs in future    Return in 4-5 months (Wed)        Orders Placed This Encounter   Procedures     ALT     Albumin level     AST     CBC with platelets differential     Creatinine     ESR     CRP inflammation     Uric acid           Zulma Lopez MD         HPI:   Betty Tee is a 81 year old WF with a history of primary Sjogren's syndrome, PMR, OA who presents for follow-up. She was diagnosed with Sjogren's syndrome in 2015 with borderline +RG, +SSA, and lip biopsy focus score of 1. Associated ILD and joint pain are prednisone-responsive which support the diagnosis.       Today 5/21/2021:    Sister Betty recovered from gout flare with pain/swelling of L foot. She is on prednisone 5 mg a day, she was given prednisone taper recently for possible L foot gout flare which helped. Did not flare again to need using anakinra shots.    She has severe R hip pain. Saw ortho, had R hip MRI on 9/5/2020 which showed severe OA, R hip arthroplasty was recommended. She would prefer to delay R hip arthroplasty, had R hip inj  on 2/24, NSAIDs are not allowed with CKD. Norco helps but she does not like to be dependent on it, takes it rarely.     No L foot pain today.    Her hip/leg pain/back pain is getting better, going to PT, doing exercises at home, last time elbow massage caused pain. Epidural shot helped.      Has dry mouth. SOB is stable at baseline.     Last 3 wk has been hard, her doctor took her off gabapentin, sweat/chills and loss of appetite. Reason was being on other meds. Now off gabapentin. Had several gushing bowel explosion, could not make it to the bathroom. Random, unpredictable. No triggers. Has had this problem for years.         Review of Systems:   A comprehensive ROS was done. Positives are per HPI.      Active Medications:     Outpatient Medications Prior to Visit   Medication Sig Dispense Refill     acetaminophen (TYLENOL) 500 MG tablet Take 1,000 mg by mouth every 8 hours as needed (max 6 tablets/24 hours, 2 tablets/dose)        acyclovir (ZOVIRAX) 400 MG tablet TAKE 1 TABLET (400 MG) BY MOUTH 3 TIMES DAILY FOR A COUPLE DAYS 15 tablet 2     acyclovir (ZOVIRAX) 5 % external ointment Apply topically 6 times daily As needed for outbreaks 15 g 3     albuterol (PROAIR HFA, PROVENTIL HFA, VENTOLIN HFA) 108 (90 BASE) MCG/ACT inhaler Inhale 2 puffs into the lungs every 6 hours 1 Inhaler 3     alendronate (FOSAMAX) 70 MG tablet Take 1 tablet (70 mg) by mouth every 7 days 12 tablet 2     anakinra (KINERET) 100 MG/0.67ML SOSY injection 100 mg every day x 3 days as needed for flare ups 6.7 mL 3     atorvastatin (LIPITOR) 10 MG tablet TAKE 1/2 TABLET BY MOUTH EVERY DAY 45 tablet 3     azithromycin (ZITHROMAX) 250 MG tablet Take 1 tablet (250 mg) by mouth daily 30 tablet 11     BD JANE U/F 32G X 4 MM insulin pen needle USE 4 DAILY AS DIRECTED. 400 each 0     cephALEXin (KEFLEX) 250 MG capsule Take 1 capsule (250 mg) by mouth every 6 hours 20 capsule 0     cevimeline (EVOXAC) 30 MG capsule Take 1 capsule (30 mg) by mouth 3  times daily 270 capsule 2     escitalopram (LEXAPRO) 20 MG tablet TAKE 1 TABLET BY MOUTH EVERY DAY 90 tablet 0     famotidine (PEPCID) 10 MG tablet Take 10 mg by mouth 2 times daily       fluticasone (FLONASE) 50 MCG/ACT nasal spray Spray 1-2 sprays into both nostrils daily as needed for allergies 18.2 mL 11     fluticasone-vilanterol (BREO ELLIPTA) 100-25 MCG/INH inhaler Inhale 1 puff into the lungs daily 1 Inhaler 11     gabapentin (NEURONTIN) 300 MG capsule Take 1 capsule (300 mg) by mouth 3 times daily 270 capsule 0     HYDROcodone-acetaminophen (NORCO) 7.5-325 MG per tablet Take 1 tablet by mouth every 12 hours 60 tablet 0     hydroxychloroquine (PLAQUENIL) 200 MG tablet Take 2 tablets (400 mg) by mouth daily Annual Plaquenil toxicity eye screening required. 180 tablet 1     insulin aspart (NOVOLOG FLEXPEN) 100 UNIT/ML pen INJECT 12 UNITS SUBCUTANEOUS 3 TIMES DAILY (WITH MEALS) 15 mL 2     insulin glargine (BASAGLAR KWIKPEN) 100 UNIT/ML pen INJECT 22 UNITS SUBCUTANEOUS DAILY (TO REPLACE LANTUS) 15 mL 1     lidocaine (LIDODERM) 5 % patch APPLY PATCH TO PAINFUL AREA FOR UP TO 12 H WITHIN A 24 H PERIOD. REMOVE AFTER 12 HOURS. 30 patch 2     loratadine (CLARITIN) 10 MG tablet TAKE 1 TABLET BY MOUTH EVERY DAY 90 tablet 1     methocarbamol (ROBAXIN) 750 MG tablet Take 1 tablet (750 mg) by mouth 2 times daily as needed for muscle spasms 60 tablet 0     metoprolol succinate ER (TOPROL-XL) 25 MG 24 hr tablet TAKE 1/2 TABLET BY MOUTH 2 TIMES DAILY WITH 50 MG FOR A TOTAL OF 62.5 TWICE A DAY 90 tablet 1     metoprolol succinate ER (TOPROL-XL) 50 MG 24 hr tablet Take 50 mg by mouth in combination with 12.5 for a total of 62.5 twice a day 180 tablet 1     montelukast (SINGULAIR) 10 MG tablet TAKE 1 TABLET BY MOUTH EVERYDAY AT BEDTIME 90 tablet 0     OZEMPIC, 1 MG/DOSE, 2 MG/1.5ML pen INJECT 1 MG SUBCUTANEOUS EVERY 7 DAYS 3 mL 0     potassium chloride ER (KLOR-CON) 20 MEQ CR tablet Take 2 tablets (40 mEq) by mouth every  morning AND 1 tablet (20 mEq) every evening. 270 tablet 3     predniSONE (DELTASONE) 5 MG tablet Take 1 tablet (5 mg) by mouth daily 30 tablet 0     rivaroxaban ANTICOAGULANT (XARELTO ANTICOAGULANT) 20 MG TABS tablet Take 1 tablet (20 mg) by mouth daily (with dinner) 90 tablet 2     spironolactone (ALDACTONE) 25 MG tablet Take 2 tablets (50 mg) by mouth daily 180 tablet 3     torsemide (DEMADEX) 10 MG tablet TAKE ONE TAB (10 MG) WITH ONE 20 MG TAB TO = 30 MG DAILY IN AFTERNOON. 90 tablet 0     torsemide (DEMADEX) 20 MG tablet TAKE 2 TABS (40 MG) IN AM DAILY AND TAKE 1 TAB BY MOUTH IN THE AFTERNOON ALONG W/ A 10 MG  tablet 4     traZODone (DESYREL) 50 MG tablet TAKE 0.5-1.5 TABLETS (25-75 MG) BY MOUTH NIGHTLY AS NEEDED FOR SLEEP 135 tablet 0     vitamin D3 (CHOLECALCIFEROL) 50 mcg (2000 units) tablet Take 1 tablet (50 mcg) by mouth daily 90 tablet 2     No facility-administered medications prior to visit.      Allergies:   Augmentin\  Codeine  Phenobarbital  Seasonal allergies      Past Medical History:  Alcohol abuse, in remission.   Allergic rhinitis.   Antiplatelet long-term use.   Atrial fibrillation.   Cardiomegaly.   Osteopenia.   Diverticulosis.   Benign essential hypertension.   GERD.   Insomnia.   Irregular heart beat.   Lumbago.   Major depressive disorder, recurrent episode, moderate.   ANGELICA.  Osteoarthrosis.   Sjogren's syndrome.   Sleep apnea.   Tobacco use disorder.   TMJ disorder.   RLS.  Major depressive disorder.   Hypertension.   Sciatica.   Colouterine fistula.   Left ventricular hypertrophy.   Breat fibroadenoma.   DVT.   Fatty liver disease, nonalcoholic.   Rib pain.   Neck mass.   Interstitial lung disease.   Acute and chronic respiratory failure with hypoxia.   Type II diabetes mellitus.   Hyperlipidemia.   Inflammatory arthritis.      Past Surgical History:  Back surgery.   Left breast biopsy.   Appendectomy.   Exploratory laparotomy.   Cardiac surgery.   Cholecystectomy.   Left  colectomy.   Total abdominal hysterectomy with bilateral salpingo-oophorectomy.   Insert ureter stent.   Flexible sigmoidoscopy.   Ileostomy takedown.     Family History:   Mother: Positive for CAD, heart disease, hypertension, cerebrovascular disease, hyperlipidemia.   Father: Positive for alcohol/drug abuse, Alzheimer disease, dementia, hypertension, hyperlipidemia.   Sister: Positive for CAD, hypertension, and colorectal cancer.   Sister: Positive for hypertension and hyperlipidemia.   Sister: Positive for diabetes, lung cancer, asthma, and heart disease.   Brother: Positive for Parkinsonism and substance abuse.   Brother: Positive for hypertension, diverticulitis, and prostate cancer.   Daughter: Positive for breast cancer.        Social History:   She is a former smoker; quit in 2011. She has a history of alcohol use but stopped drinking in 1986.      Physical Exam:   Constitutional: NAD, very pleasant, sister Nghia present to help  Eyes: Normal EOM, conjunctiva, sclera  MS: redness/tenderness/swelling over lateral side of L foot has improved, no synovitis  Skin: No alopecia, rash  Neuro: grossly non-focal  Psych: Normal affect.    Images:  CT Chest w/o contrast (7/25/18):  Findings remain most consistent with small airway disease  and/or nonclassifiable interstitial lung disease and are not  significantly changed from the March 2017 comparison.    Per radiology.    Laboratory:   RHEUM RESULTS Latest Ref Rng & Units 12/4/2020 1/5/2021 3/3/2021   COMPLEMENT C3 81 - 157 mg/dL - - -   COMPLEMENT C4 13 - 39 mg/dL - - -   SED RATE 0 - 30 mm/h 15 - 23   CRP, INFLAMMATION 0.0 - 8.0 mg/L 15.0(H) - 45.0(H)   CK TOTAL 30 - 225 U/L - - -   RHEUMATOID FACTOR <12 IU/mL - - -   RG SCREEN BY EIA <1.0 - - -   AST 0 - 45 U/L 18 - -   ALT 0 - 50 U/L 20 - -   ALBUMIN 3.4 - 5.0 g/dL 3.1(L) - -   WBC 4.0 - 11.0 10e9/L 10.3 - -   RBC 3.8 - 5.2 10e12/L 4.38 - -   HGB 11.7 - 15.7 g/dL 13.2 - -   HCT 35.0 - 47.0 % 41.2 - -   MCV 78 -  100 fl 94 - -   MCHC 31.5 - 36.5 g/dL 32.0 - -   RDW 10.0 - 15.0 % 15.0 - -    - 450 10e9/L 189 - -   CREATININE 0.52 - 1.04 mg/dL 1.49(H) 1.10(H) 1.02   GFR ESTIMATE, IF BLACK >60 mL/min/[1.73:m2] 38(L) 55(L) 60(L)   GFR ESTIMATE >60 mL/min/[1.73:m2] 33(L) 47(L) 52(L)    - 1,616 mg/dL - - -   IGA 84 - 499 mg/dL - - -   IGM 35 - 242 mg/dL - - -       Rheumatoid Factor   Date Value Ref Range Status   07/24/2015 <20 <20 IU/mL Final   ,  ,   Cyclic Cit Pept IgG/IgA   Date Value Ref Range Status   07/24/2015 <20  Interpretation:  Negative   <20 UNITS Final   ,  ,   Scleroderma Antibody Scl-70 CECILIA IgG   Date Value Ref Range Status   07/24/2015  0.0 - 0.9 AI Final    <0.2  Negative   Antibody index (AI) values reflect qualitative changes in antibody   concentration that cannot be directly associated with clinical condition or   disease state.       SSA (Ro) (CECILIA) Antibody, IgG   Date Value Ref Range Status   07/24/2015 1.6 (H) 0.0 - 0.9 AI Final     Comment:     Positive   Antibody index (AI) values reflect qualitative changes in antibody   concentration that cannot be directly associated with clinical condition or   disease state.       SSB (La) (CECILIA) Antibody, IgG   Date Value Ref Range Status   07/24/2015  0.0 - 0.9 AI Final    <0.2  Negative   Antibody index (AI) values reflect qualitative changes in antibody   concentration that cannot be directly associated with clinical condition or   disease state.       ,  ,   RG Screen by EIA   Date Value Ref Range Status   07/24/2015 <1.0  Interpretation:  Negative   <1.0 Final   ,  ,  ,  ,  ,  ,  ,  ,  ,  ,  ,  ,  ,  ,  ,  ,  ,  ,   Neutrophil Cytoplasmic IgG Antibody   Date Value Ref Range Status   07/24/2015   Final    <1:20  Reference range: <1:20  (Note)  The ANCA IFA is <1:20; therefore, no further testing will  be performed.  INTERPRETIVE INFORMATION: Anti-Neutrophil Cyto Ab, IgG  Neutrophil Cytoplasmic Antibodies (C-ANCA = granular  cytoplasmic staining,  P-ANCA = perinuclear staining) are  found in the serum of over 90 percent of patients with  certain necrotizing systemic vasculitides, and usually in  less than 5 percent of patients with collagen vascular  disease or arthritis.  Performed by [facility],  [address] [place] [id number] [phone]  [email or web address], Burke Mckeon MD, Lab. Director       ,  ,  ,  ,  ,  ,  ,   IGG   Date Value Ref Range Status   07/24/2015 1320 695 - 1620 mg/dL Final   ,  ,  ,  ,  ,   Scleroderma Antibody Scl-70 CECILIA IgG   Date Value Ref Range Status   07/24/2015  0.0 - 0.9 AI Final    <0.2  Negative   Antibody index (AI) values reflect qualitative changes in antibody   concentration that cannot be directly associated with clinical condition or   disease state.       Component      Latest Ref Rng & Units 12/4/2020   WBC      4.0 - 11.0 10e9/L 10.3   RBC Count      3.8 - 5.2 10e12/L 4.38   Hemoglobin      11.7 - 15.7 g/dL 13.2   Hematocrit      35.0 - 47.0 % 41.2   MCV      78 - 100 fl 94   MCH      26.5 - 33.0 pg 30.1   MCHC      31.5 - 36.5 g/dL 32.0   RDW      10.0 - 15.0 % 15.0   Platelet Count      150 - 450 10e9/L 189   % Neutrophils      % 72.8   % Lymphocytes      % 14.4   % Monocytes      % 10.6   % Eosinophils      % 1.7   % Basophils      % 0.5   Absolute Neutrophil      1.6 - 8.3 10e9/L 7.5   Absolute Lymphocytes      0.8 - 5.3 10e9/L 1.5   Absolute Monocytes      0.0 - 1.3 10e9/L 1.1   Absolute Eosinophils      0.0 - 0.7 10e9/L 0.2   Absolute Basophils      0.0 - 0.2 10e9/L 0.1   Diff Method       Automated Method   Sodium      133 - 144 mmol/L 138   Potassium      3.4 - 5.3 mmol/L 3.6   Chloride      94 - 109 mmol/L 105   Carbon Dioxide      20 - 32 mmol/L 27   Anion Gap      3 - 14 mmol/L 6   Glucose      70 - 99 mg/dL 176 (H)   Urea Nitrogen      7 - 30 mg/dL 16   Creatinine      0.52 - 1.04 mg/dL 1.49 (H)   GFR Estimate      >60 mL/min/1.73:m2 33 (L)   GFR Estimate If Black      >60 mL/min/1.73:m2 38 (L)    Calcium      8.5 - 10.1 mg/dL 9.4   Bilirubin Total      0.2 - 1.3 mg/dL 0.6   Albumin      3.4 - 5.0 g/dL 3.1 (L)   Protein Total      6.8 - 8.8 g/dL 7.3   Alkaline Phosphatase      40 - 150 U/L 95   ALT      0 - 50 U/L 20   AST      0 - 45 U/L 18   Sed Rate      0 - 30 mm/h 15   CRP Inflammation      0.0 - 8.0 mg/L 15.0 (H)

## 2021-05-21 NOTE — LETTER
"5/21/2021       RE: Betty Tee  [address]  [facility] [address]     Dear Colleague,    Thank you for referring your patient, Betty Tee, to the [facility] RHEUMATOLOGY CLINIC [place] at [facility]. Please see a copy of my visit note below.    Left voicemail for patient to call back to set up telemedicine visit, will call again before appointment time.  Lupe Gamboa, CMA on 5/21/2021 at 1:24 PM      Sister Betty is a 81 year old who is being evaluated via a billable video visit.      How would you like to obtain your AVS? MyChart  If the video visit is dropped, the invitation should be resent by: Text to cell phone: [phone]  Will anyone else be joining your video visit? No  {If patient encounters technical issues they should call [phone]}    Video Start Time: {video visit start/end time for provider to select:204876}  Video-Visit Details    Type of service:  Video Visit    Video End Time:{video visit start/end time for provider to select:355480}    Originating Location (pt. Location): {video visit patient location:623519::\"Home\"}    Distant Location (provider location):  [facility] RHEUMATOLOGY [facility]     Platform used for Video Visit: {Virtual Visit Platforms:045173::\"[facility]\"}       Sister Betty is a 80 year old who is being evaluated via a billable video visit.      How would you like to obtain your AVS? Mail a copy  If the video visit is dropped, the invitation should be resent by: Send   Will anyone else be joining your video visit? No  {If patient encounters technical issues they should call [phone] :[phone]}    Video Start Time: 5:01 pm  Video-Visit Details    Type of service:  Video Visit    Video End Time: 5:29 pm    Originating Location (pt. Location): Home    Distant Location (provider location):  [facility] RHEUMATOLOGY [facility]     Platform used for Video Visit: " [facility]       Rheumatology Clinic Virtual Visit Note  Zulma Lopez MD  DOS: 2021  Date of last visit: 2020    Name: Betty Tee  MRN: [id number]  Age: 80 year old  : 1940  Reason for visit: Follow up visit for R hip pain sec severe OA, PMR, presumed gout flare of L foot, primary Sjogren's syndrome    # Sjogren's syndrome since  with borderline +RG, +SSA, and lip biopsy focus score of 1. Ongoing dry mouth on evoxac qod  # Follicular bronchiolitis, prior mild ILD   # Osteopenia on DEXA 2019 with T score=-2.1 with decline in bone density on fosamax  # Chronic prednisone use  # DM  # A fib  # Severe R hip OA with upcoming local steroid inj on norco prn  # PMR stable on prednisone 5 mg every day  #Presumed gout flare in L foot started today      Assessment and Plan:    New Dx of PMR. Severe R hip pain is due to severe OA based on 2020 hip MRI. She prefers to avoid hip arthroplasty. Her inflammation markers improved on prednisone, PMR responded to prednisone, now is 5 mg qd. Recurrent presumed gout flare today (not crystal proven) responded to prednisone high dose in the past. Lab did not draw blood for ESR/CRP/SUA between flares.    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 Completed pulmonary rehab in 2019 where she was able to exercise without oxygen. GFR improved since last visit.    On HCQ since 2019 with significant improvement of joint pain. No retinal toxicity on eye exam done 2021. Tolerates HCQ well.     Sister Betty woke up today with flare of presumed gout (or pseudogout given previous nl SUA) over L foot. She responded to high dose prednisone (40-30-20-10 mg every day each for 3 days) in the past, now is on 5 mg every day which was tapered from 10 mg every day in 2020.  Given her DM, osteopenia, highly recommend to start using anakinra prn for gout  flares, 100 mg every day x 3 days prn; risks were discussed. Meanwhile, awaiting insurance approval, advised to increase prednisone to 40 mg every day just for 3 days then drop down to 5 mg every day. This way, would not interfere with 2nd covid vaccine 2/11 and upcoming R hip inj 2/24.      Plan:    Prednisone 40 mg a day x 3 days (Sat, Sun, Mon) then drop down to 5 mg a day    Norco as needed for pain, was refilled (she rarely takes it)    Cevimeline for dry mouth could be taken 3 times a day (she takes it every other day as does not like to take so many pills)    Plan is to get insurance approval for anakinra 100 mg a day x 3 days as needed for gout flares    Labs when gout flare is over as SUA could drop during flares    Follow up with Dr. See, keep local steroid inj to R hip    Stay on fosamax weekly    Continue  mg bid    Yearly eye exam on HCQ, due 1/2022    Return in 4-5 months        No orders of the defined types were placed in this encounter.          Zulma Lopez MD         HPI:   Betty Tee is a 80 year old WF with a history of primary Sjogren's syndrome, PMR, OA who presents for follow-up. She was diagnosed with Sjogren's syndrome in 2015 with borderline +RG, +SSA, and lip biopsy focus score of 1. Associated ILD and joint pain are prednisone-responsive which support the diagnosis.     Today  2/5/2021: Sister Betty woke up with pain/swelling of L foot. She is on prednisone 5 mg a day, she was given prednisone taper recently for possible L foot gout flare which helped, this was 10 mg every day at last visit in 12/2020. She has severe R hip pain. Saw ortho, had R hip MRI on 9/5/2020 which showed severe OA, R hip arthroplasty was recommended. She would prefer to delay R hip arthroplasty, now scheduled for R hip inj on 2/24, was told to avoid systemic steroid around that time. Is due for 2nd COVID vaccine on 2/11, 1st dose caused no SE. She takes tylenol which does not help. NSAIDs  are not allowed with CKD. Norco helps but she does not like to be dependent on it, takes it rarely.    Has dry mouth. SOB is stable at baseline. No other complaints.    89 sister    Pred 5 2/9    Anakinra has not used    Has pain hip leg back pain is getting better, going to PT, doing exercises at home,last time elbow massage caused pain. Epidural shot helped pyriformis helped.    Last 3 wk has been hard, her doctor took her off gabapentin, sweat/chills and loss of appetite. Reason was being on other meds. Now off gabapentin. Had several gushing bowel explosion, could not make it to the bathroom. Random, unpredicatable. No triggers.   Has had this problem for years.     Foot is doing fine.    Labs in future    4-5 months (in person)    Norco refill    2 24    Review of Systems:   A comprehensive ROS was done. Positives are per HPI.      Active Medications:     Outpatient Medications Prior to Visit   Medication Sig Dispense Refill     acetaminophen (TYLENOL) 500 MG tablet Take 1,000 mg by mouth every 8 hours as needed (max 6 tablets/24 hours, 2 tablets/dose)        acyclovir (ZOVIRAX) 400 MG tablet TAKE 1 TABLET (400 MG) BY MOUTH 3 TIMES DAILY FOR A COUPLE DAYS 15 tablet 2     acyclovir (ZOVIRAX) 5 % external ointment Apply topically 6 times daily As needed for outbreaks 15 g 3     albuterol (PROAIR HFA, PROVENTIL HFA, VENTOLIN HFA) 108 (90 BASE) MCG/ACT inhaler Inhale 2 puffs into the lungs every 6 hours 1 Inhaler 3     alendronate (FOSAMAX) 70 MG tablet Take 1 tablet (70 mg) by mouth every 7 days 12 tablet 2     anakinra (KINERET) 100 MG/0.67ML SOSY injection 100 mg every day x 3 days as needed for flare ups 6.7 mL 3     atorvastatin (LIPITOR) 10 MG tablet TAKE 1/2 TABLET BY MOUTH EVERY DAY 45 tablet 3     azithromycin (ZITHROMAX) 250 MG tablet Take 1 tablet (250 mg) by mouth daily 30 tablet 11     BD JANE U/F 32G X 4 MM insulin pen needle USE 4 DAILY AS DIRECTED. 400 each 0     cephALEXin (KEFLEX) 250 MG capsule  Take 1 capsule (250 mg) by mouth every 6 hours 20 capsule 0     cevimeline (EVOXAC) 30 MG capsule Take 1 capsule (30 mg) by mouth 3 times daily 270 capsule 2     escitalopram (LEXAPRO) 20 MG tablet TAKE 1 TABLET BY MOUTH EVERY DAY 90 tablet 0     famotidine (PEPCID) 10 MG tablet Take 10 mg by mouth 2 times daily       fluticasone (FLONASE) 50 MCG/ACT nasal spray Spray 1-2 sprays into both nostrils daily as needed for allergies 18.2 mL 11     fluticasone-vilanterol (BREO ELLIPTA) 100-25 MCG/INH inhaler Inhale 1 puff into the lungs daily 1 Inhaler 11     gabapentin (NEURONTIN) 300 MG capsule Take 1 capsule (300 mg) by mouth 3 times daily 270 capsule 0     HYDROcodone-acetaminophen (NORCO) 7.5-325 MG per tablet Take 1 tablet by mouth every 12 hours 60 tablet 0     hydroxychloroquine (PLAQUENIL) 200 MG tablet Take 2 tablets (400 mg) by mouth daily Annual Plaquenil toxicity eye screening required. 180 tablet 1     insulin aspart (NOVOLOG FLEXPEN) 100 UNIT/ML pen INJECT 12 UNITS SUBCUTANEOUS 3 TIMES DAILY (WITH MEALS) 15 mL 2     insulin glargine (BASAGLAR KWIKPEN) 100 UNIT/ML pen INJECT 22 UNITS SUBCUTANEOUS DAILY (TO REPLACE LANTUS) 15 mL 1     lidocaine (LIDODERM) 5 % patch APPLY PATCH TO PAINFUL AREA FOR UP TO 12 H WITHIN A 24 H PERIOD. REMOVE AFTER 12 HOURS. 30 patch 2     loratadine (CLARITIN) 10 MG tablet TAKE 1 TABLET BY MOUTH EVERY DAY 90 tablet 1     methocarbamol (ROBAXIN) 750 MG tablet Take 1 tablet (750 mg) by mouth 2 times daily as needed for muscle spasms 60 tablet 0     metoprolol succinate ER (TOPROL-XL) 25 MG 24 hr tablet TAKE 1/2 TABLET BY MOUTH 2 TIMES DAILY WITH 50 MG FOR A TOTAL OF 62.5 TWICE A DAY 90 tablet 1     metoprolol succinate ER (TOPROL-XL) 50 MG 24 hr tablet Take 50 mg by mouth in combination with 12.5 for a total of 62.5 twice a day 180 tablet 1     montelukast (SINGULAIR) 10 MG tablet TAKE 1 TABLET BY MOUTH EVERYDAY AT BEDTIME 90 tablet 0     OZEMPIC, 1 MG/DOSE, 2 MG/1.5ML pen INJECT 1 MG  SUBCUTANEOUS EVERY 7 DAYS 3 mL 0     potassium chloride ER (KLOR-CON) 20 MEQ CR tablet Take 2 tablets (40 mEq) by mouth every morning AND 1 tablet (20 mEq) every evening. 270 tablet 3     predniSONE (DELTASONE) 5 MG tablet Take 1 tablet (5 mg) by mouth daily 30 tablet 0     rivaroxaban ANTICOAGULANT (XARELTO ANTICOAGULANT) 20 MG TABS tablet Take 1 tablet (20 mg) by mouth daily (with dinner) 90 tablet 2     spironolactone (ALDACTONE) 25 MG tablet Take 2 tablets (50 mg) by mouth daily 180 tablet 3     torsemide (DEMADEX) 10 MG tablet TAKE ONE TAB (10 MG) WITH ONE 20 MG TAB TO = 30 MG DAILY IN AFTERNOON. 90 tablet 0     torsemide (DEMADEX) 20 MG tablet TAKE 2 TABS (40 MG) IN AM DAILY AND TAKE 1 TAB BY MOUTH IN THE AFTERNOON ALONG W/ A 10 MG  tablet 4     traZODone (DESYREL) 50 MG tablet TAKE 0.5-1.5 TABLETS (25-75 MG) BY MOUTH NIGHTLY AS NEEDED FOR SLEEP 135 tablet 0     vitamin D3 (CHOLECALCIFEROL) 50 mcg (2000 units) tablet Take 1 tablet (50 mcg) by mouth daily 90 tablet 2     No facility-administered medications prior to visit.      Allergies:   Augmentin\  Codeine  Phenobarbital  Seasonal allergies      Past Medical History:  Alcohol abuse, in remission.   Allergic rhinitis.   Antiplatelet long-term use.   Atrial fibrillation.   Cardiomegaly.   Osteopenia.   Diverticulosis.   Benign essential hypertension.   GERD.   Insomnia.   Irregular heart beat.   Lumbago.   Major depressive disorder, recurrent episode, moderate.   ANGELICA.  Osteoarthrosis.   Sjogren's syndrome.   Sleep apnea.   Tobacco use disorder.   TMJ disorder.   RLS.  Major depressive disorder.   Hypertension.   Sciatica.   Colouterine fistula.   Left ventricular hypertrophy.   Breat fibroadenoma.   DVT.   Fatty liver disease, nonalcoholic.   Rib pain.   Neck mass.   Interstitial lung disease.   Acute and chronic respiratory failure with hypoxia.   Type II diabetes mellitus.   Hyperlipidemia.   Inflammatory arthritis.      Past Surgical History:  Back  surgery.   Left breast biopsy.   Appendectomy.   Exploratory laparotomy.   Cardiac surgery.   Cholecystectomy.   Left colectomy.   Total abdominal hysterectomy with bilateral salpingo-oophorectomy.   Insert ureter stent.   Flexible sigmoidoscopy.   Ileostomy takedown.     Family History:   Mother: Positive for CAD, heart disease, hypertension, cerebrovascular disease, hyperlipidemia.   Father: Positive for alcohol/drug abuse, Alzheimer disease, dementia, hypertension, hyperlipidemia.   Sister: Positive for CAD, hypertension, and colorectal cancer.   Sister: Positive for hypertension and hyperlipidemia.   Sister: Positive for diabetes, lung cancer, asthma, and heart disease.   Brother: Positive for Parkinsonism and substance abuse.   Brother: Positive for hypertension, diverticulitis, and prostate cancer.   Daughter: Positive for breast cancer.        Social History:   She is a former smoker; quit in 2011. She has a history of alcohol use but stopped drinking in 1986.      Physical Exam:   Constitutional: NAD, very pleasant, sister Nghia present to help  Eyes: Normal EOM, conjunctiva, sclera  MS: redness/tenderness/swelling over lateral side of L foot  Skin: No alopecia, rash  Neuro: grossly non-focal  Psych: Normal affect.    Images:  CT Chest w/o contrast (7/25/18):  Findings remain most consistent with small airway disease  and/or nonclassifiable interstitial lung disease and are not  significantly changed from the March 2017 comparison.    Per radiology.    Laboratory:   RHEUM RESULTS Latest Ref Rng & Units 12/4/2020 1/5/2021 3/3/2021   COMPLEMENT C3 81 - 157 mg/dL - - -   COMPLEMENT C4 13 - 39 mg/dL - - -   SED RATE 0 - 30 mm/h 15 - 23   CRP, INFLAMMATION 0.0 - 8.0 mg/L 15.0(H) - 45.0(H)   CK TOTAL 30 - 225 U/L - - -   RHEUMATOID FACTOR <12 IU/mL - - -   RG SCREEN BY EIA <1.0 - - -   AST 0 - 45 U/L 18 - -   ALT 0 - 50 U/L 20 - -   ALBUMIN 3.4 - 5.0 g/dL 3.1(L) - -   WBC 4.0 - 11.0 10e9/L 10.3 - -   RBC 3.8 - 5.2  10e12/L 4.38 - -   HGB 11.7 - 15.7 g/dL 13.2 - -   HCT 35.0 - 47.0 % 41.2 - -   MCV 78 - 100 fl 94 - -   MCHC 31.5 - 36.5 g/dL 32.0 - -   RDW 10.0 - 15.0 % 15.0 - -    - 450 10e9/L 189 - -   CREATININE 0.52 - 1.04 mg/dL 1.49(H) 1.10(H) 1.02   GFR ESTIMATE, IF BLACK >60 mL/min/[1.73:m2] 38(L) 55(L) 60(L)   GFR ESTIMATE >60 mL/min/[1.73:m2] 33(L) 47(L) 52(L)    - 1,616 mg/dL - - -   IGA 84 - 499 mg/dL - - -   IGM 35 - 242 mg/dL - - -       Rheumatoid Factor   Date Value Ref Range Status   07/24/2015 <20 <20 IU/mL Final   ,  ,   Cyclic Cit Pept IgG/IgA   Date Value Ref Range Status   07/24/2015 <20  Interpretation:  Negative   <20 UNITS Final   ,  ,   Scleroderma Antibody Scl-70 CECILIA IgG   Date Value Ref Range Status   07/24/2015  0.0 - 0.9 AI Final    <0.2  Negative   Antibody index (AI) values reflect qualitative changes in antibody   concentration that cannot be directly associated with clinical condition or   disease state.       SSA (Ro) (CECILIA) Antibody, IgG   Date Value Ref Range Status   07/24/2015 1.6 (H) 0.0 - 0.9 AI Final     Comment:     Positive   Antibody index (AI) values reflect qualitative changes in antibody   concentration that cannot be directly associated with clinical condition or   disease state.       SSB (La) (CECILIA) Antibody, IgG   Date Value Ref Range Status   07/24/2015  0.0 - 0.9 AI Final    <0.2  Negative   Antibody index (AI) values reflect qualitative changes in antibody   concentration that cannot be directly associated with clinical condition or   disease state.       ,  ,   RG Screen by EIA   Date Value Ref Range Status   07/24/2015 <1.0  Interpretation:  Negative   <1.0 Final   ,  ,  ,  ,  ,  ,  ,  ,  ,  ,  ,  ,  ,  ,  ,  ,  ,  ,   Neutrophil Cytoplasmic IgG Antibody   Date Value Ref Range Status   07/24/2015   Final    <1:20  Reference range: <1:20  (Note)  The ANCA IFA is <1:20; therefore, no further testing will  be performed.  INTERPRETIVE INFORMATION: Anti-Neutrophil  Cyto Ab, IgG  Neutrophil Cytoplasmic Antibodies (C-ANCA = granular  cytoplasmic staining, P-ANCA = perinuclear staining) are  found in the serum of over 90 percent of patients with  certain necrotizing systemic vasculitides, and usually in  less than 5 percent of patients with collagen vascular  disease or arthritis.  Performed by [facility],  [address] [place] [id number] [phone]  [email or web address], Burke Mckeon MD, Lab. Director       ,  ,  ,  ,  ,  ,  ,   IGG   Date Value Ref Range Status   07/24/2015 1320 695 - 1620 mg/dL Final   ,  ,  ,  ,  ,   Scleroderma Antibody Scl-70 CECILIA IgG   Date Value Ref Range Status   07/24/2015  0.0 - 0.9 AI Final    <0.2  Negative   Antibody index (AI) values reflect qualitative changes in antibody   concentration that cannot be directly associated with clinical condition or   disease state.       Component      Latest Ref Rng & Units 12/4/2020   WBC      4.0 - 11.0 10e9/L 10.3   RBC Count      3.8 - 5.2 10e12/L 4.38   Hemoglobin      11.7 - 15.7 g/dL 13.2   Hematocrit      35.0 - 47.0 % 41.2   MCV      78 - 100 fl 94   MCH      26.5 - 33.0 pg 30.1   MCHC      31.5 - 36.5 g/dL 32.0   RDW      10.0 - 15.0 % 15.0   Platelet Count      150 - 450 10e9/L 189   % Neutrophils      % 72.8   % Lymphocytes      % 14.4   % Monocytes      % 10.6   % Eosinophils      % 1.7   % Basophils      % 0.5   Absolute Neutrophil      1.6 - 8.3 10e9/L 7.5   Absolute Lymphocytes      0.8 - 5.3 10e9/L 1.5   Absolute Monocytes      0.0 - 1.3 10e9/L 1.1   Absolute Eosinophils      0.0 - 0.7 10e9/L 0.2   Absolute Basophils      0.0 - 0.2 10e9/L 0.1   Diff Method       Automated Method   Sodium      133 - 144 mmol/L 138   Potassium      3.4 - 5.3 mmol/L 3.6   Chloride      94 - 109 mmol/L 105   Carbon Dioxide      20 - 32 mmol/L 27   Anion Gap      3 - 14 mmol/L 6   Glucose      70 - 99 mg/dL 176 (H)   Urea Nitrogen      7 - 30 mg/dL 16   Creatinine      0.52 - 1.04 mg/dL 1.49 (H)   GFR Estimate       >60 mL/min/1.73:m2 33 (L)   GFR Estimate If Black      >60 mL/min/1.73:m2 38 (L)   Calcium      8.5 - 10.1 mg/dL 9.4   Bilirubin Total      0.2 - 1.3 mg/dL 0.6   Albumin      3.4 - 5.0 g/dL 3.1 (L)   Protein Total      6.8 - 8.8 g/dL 7.3   Alkaline Phosphatase      40 - 150 U/L 95   ALT      0 - 50 U/L 20   AST      0 - 45 U/L 18   Sed Rate      0 - 30 mm/h 15   CRP Inflammation      0.0 - 8.0 mg/L 15.0 (H)

## 2021-05-21 NOTE — PROGRESS NOTES
Left voicemail for patient to call back to set up telemedicine visit, will call again before appointment time.  Lupe Gamboa CMA on 5/21/2021 at 1:24 PM

## 2021-05-24 ENCOUNTER — THERAPY VISIT (OUTPATIENT)
Dept: PHYSICAL THERAPY | Facility: CLINIC | Age: 81
End: 2021-05-24
Payer: MEDICARE

## 2021-05-24 DIAGNOSIS — M25.551 HIP PAIN, RIGHT: ICD-10-CM

## 2021-05-24 PROCEDURE — 97110 THERAPEUTIC EXERCISES: CPT | Mod: GP | Performed by: PHYSICAL THERAPIST

## 2021-05-24 PROCEDURE — 97112 NEUROMUSCULAR REEDUCATION: CPT | Mod: GP | Performed by: PHYSICAL THERAPIST

## 2021-05-25 ENCOUNTER — VIRTUAL VISIT (OUTPATIENT)
Dept: PHARMACY | Facility: CLINIC | Age: 81
End: 2021-05-25
Payer: COMMERCIAL

## 2021-05-25 DIAGNOSIS — Z79.4 TYPE 2 DIABETES MELLITUS WITH OTHER SPECIFIED COMPLICATION, WITH LONG-TERM CURRENT USE OF INSULIN (H): Primary | ICD-10-CM

## 2021-05-25 DIAGNOSIS — K21.9 GASTROESOPHAGEAL REFLUX DISEASE WITHOUT ESOPHAGITIS: ICD-10-CM

## 2021-05-25 DIAGNOSIS — I50.32 CHRONIC HEART FAILURE WITH PRESERVED EJECTION FRACTION (H): ICD-10-CM

## 2021-05-25 DIAGNOSIS — E11.69 TYPE 2 DIABETES MELLITUS WITH OTHER SPECIFIED COMPLICATION, WITH LONG-TERM CURRENT USE OF INSULIN (H): Primary | ICD-10-CM

## 2021-05-25 DIAGNOSIS — J30.1 NON-SEASONAL ALLERGIC RHINITIS DUE TO POLLEN: ICD-10-CM

## 2021-05-25 DIAGNOSIS — M19.90 INFLAMMATORY ARTHRITIS: ICD-10-CM

## 2021-05-25 DIAGNOSIS — M25.551 HIP PAIN, RIGHT: ICD-10-CM

## 2021-05-25 PROCEDURE — 99606 MTMS BY PHARM EST 15 MIN: CPT | Performed by: PHARMACIST

## 2021-05-25 PROCEDURE — 99607 MTMS BY PHARM ADDL 15 MIN: CPT | Performed by: PHARMACIST

## 2021-05-25 NOTE — PROGRESS NOTES
Medication Therapy Management (MTM) Encounter    ASSESSMENT:                            Medication Adherence/Access: No issues identified    Type 2 Diabetes:  Per her report her home glucose is at goal. A1c is trending down (due for recheck next month) and is near goal of <8%.      Back/Leg pain: Discussed her concerns about addiction, discussed dietary sources of fiber to help with constipation/regularity.     Gout: Stable    Allergies: May be able to reduce her use of allergy meds in the future as there is not much evidence for overlapping antihistamines and nasal steroids. However, since her allergies are really bothering her right now, will hold on this.     Heartburn: Can switch famotidine to as needed to try to reduce pill burden.     HF: Would benefit from switching her diuretic dose to have the higher dose in the AM to hopefully cut down on nighttime urination.     PLAN:                            1. Switch your torsemide dosing - take 40mg in the morning and 30mg in the afternoon.   2. Try taking famotidine as needed instead of everyday. You can take this before you eat foods you know will cause heartburn, or you can take it when you have heartburn.     Follow-up: 1 month with Dr. Tristan on 6/4. MTM to follow-up after that appointment based on labs/patient need.       SUBJECTIVE/OBJECTIVE:                          Betty Tee is a 81 year old female called for a follow-up visit. She was referred to me from Dr. Tristan.  Today's visit is a follow-up MTM visit from 1/5/21.     Reason for visit: follow-up on diabetes, however, her biggest concern today is her pain    Allergies/ADRs: Reviewed in chart  Tobacco: She reports that she quit smoking about 9 years ago. Her smoking use included cigarettes. She has a 5.00 pack-year smoking history. She has never used smokeless tobacco.  Alcohol: not currently using  Past Medical History: Reviewed in chart    Medication Adherence/Access: no issues reported - would  "like to reduce/eliminate as many meds as possible.     Type 2 Diabetes:  Currently taking basaglar 32 units daily, Ozempic 1mg weekly, Novolog 11 units with meals. Denies SE (has had issues with diarrhea, these are unchanged, see previous notes, not clear that this is related to her DM meds).   Blood sugar monitoring: 3 time(s) daily. Ranges (pt reported):   AM (fasting): 117 - which is pretty common for her.   Noon: 150's  Supper: 150's  Denies s/sx of hypo or hyperglycemia.   Diet/Exercise: unchanged from previous visits.   Aspirin: Not taking - currently on Xarelto, concern for bleed risk.   Statin: Yes: atorvastatin 5mg daily (LDL at last check was 19 so dose has not been escalated per guidelines)  ACEi/ARB: No - stopped due to low BP by cardiology in 2019.  Urine albumin remains elevated at 40.55 at last check, but is actually improved over the year prior when it was >100.     Back/Leg pain: Followed by Dr. oMore for this.  She has not been taking any pain medication or muscle relaxants because she is afraid of addiction (she has prescriptions for Norco 7.5/325mg and methocarbamol) . Yesterday the pain was so bad that she did take one of each and felt a lot better. \"I don't know why I'm fighting them so much\"  She experience some constipation with pain medications - difficult for her to have regular BM. Alternates between constipation and diarrhea. She is staying well hydrated.   She is also doing PT.   She recently titrated off gabapentin - felt awful for about a week, sweats/chills and no appetite. Feeling OK now. Gabapentin was doing absolutely nothing for her pain and she wonders how many of her other medicines aren't working.     Gout: Has not had any gout symptoms recently. Has Anakinra for use if she should have a flare. She's also on prednisone for gout/RA.     Allergies: Lately she has noticed more nasal sx - \"itchy and drippy.\" She is currently taking flonase, motelukast and loratidine.     Heartburn: " currently taking famotidine daily. Not experiencing symptoms of heartburn at this time.      HF: Not discussed in detail today due to time, during med rec discovered that she is taking torsemide 30mg in the AM and 40mg in the PM and is up frequently to use the bathroom at night.     ---------------    I spent 30 minutes with this patient today. All changes were made via collaborative practice agreement with Dr. Tristan. A copy of the visit note was provided to the patient's primary care provider.    The patient was sent via [email or web address] a summary of these recommendations.     Sabrina Meek, MarcosD, JAMES, BCACP  MTM Pharmacist, [facility]     Telemedicine Visit Details  Type of service:  Telephone visit  Start Time: 2:01 PM  End Time: 2:31 PM  Originating Location (patient location): [place]  Distant Location (provider location):  [facility]      Medication Therapy Recommendations  No medication therapy recommendations to display

## 2021-05-28 NOTE — PATIENT INSTRUCTIONS
Recommendations from today's MTM visit:                                                       1. Switch your torsemide dosing - take 40mg in the morning and 30mg in the afternoon.     2. Try taking famotidine as needed instead of everyday. You can take this before you eat foods you know will cause heartburn, or you can take it when you have heartburn.     Follow-up: 1 month with Dr. Tristan on 6/4. MTM to follow-up after that appointment based on labs/patient need.     It was great to speak with you today.  I value your experience and would be very thankful for your time with providing feedback on our clinic survey. You may receive a survey via email or text message in the next few days.     To schedule another MTM appointment, please call the clinic directly or you may call the MTM scheduling line at [phone] or toll-free at [phone].     My Clinical Pharmacist's contact information:                                                      Please feel free to contact me with any questions or concerns you have.      Sabrina Meek, Pharm.D., M.B.A., BCACP  MTM Pharmacist, [facility]  Pager: [phone]  Email: [email or web address]

## 2021-06-01 ENCOUNTER — THERAPY VISIT (OUTPATIENT)
Dept: PHYSICAL THERAPY | Facility: CLINIC | Age: 81
End: 2021-06-01
Payer: MEDICARE

## 2021-06-01 DIAGNOSIS — M25.551 HIP PAIN, RIGHT: ICD-10-CM

## 2021-06-01 PROCEDURE — 97112 NEUROMUSCULAR REEDUCATION: CPT | Mod: GP | Performed by: PHYSICAL THERAPIST

## 2021-06-01 PROCEDURE — 97110 THERAPEUTIC EXERCISES: CPT | Mod: GP | Performed by: PHYSICAL THERAPIST

## 2021-06-02 DIAGNOSIS — F33.1 MAJOR DEPRESSIVE DISORDER, RECURRENT EPISODE, MODERATE (H): ICD-10-CM

## 2021-06-02 NOTE — PROGRESS NOTES
Assessment & Plan       ICD-10-CM    1. Piriformis syndrome, right  G57.01    2. Other chronic pain  G89.29 Basic metabolic panel     PHYSICAL THERAPY REFERRAL   3. Osteoarthritis of right hip, unspecified osteoarthritis type  M16.11 Basic metabolic panel     PHYSICAL THERAPY REFERRAL   4. Inflammatory arthritis  M19.90 Basic metabolic panel     PHYSICAL THERAPY REFERRAL   5. Polymyalgia rheumatica (H)  M35.3 ALT     Uric acid     CRP inflammation     ESR     CBC with platelets differential     AST     Albumin level     PHYSICAL THERAPY REFERRAL   6. Seronegative rheumatoid arthritis (H)  M06.00 ALT     Uric acid     CRP inflammation     ESR     CBC with platelets differential     AST     Albumin level     PHYSICAL THERAPY REFERRAL   7. Long-term use of Plaquenil  Z79.899 ALT     Uric acid     CRP inflammation     ESR     CBC with platelets differential     AST     Albumin level   8. Gout, unspecified cause, unspecified chronicity, unspecified site  M10.9 ALT     Uric acid     CRP inflammation     ESR     CBC with platelets differential     AST     Albumin level   9. Urge incontinence of urine  N39.41    10. Gastroesophageal reflux disease without esophagitis  K21.9    11. Type 2 diabetes mellitus with hyperglycemia, with long-term current use of insulin (H)  E11.65 Hemoglobin A1c    Z79.4    12. Chronic kidney disease, unspecified CKD stage  N18.9 Basic metabolic panel   13. Major depressive disorder, recurrent episode, moderate  F33.1 escitalopram (LEXAPRO) 20 MG tablet   14. Permanent atrial fibrillation (H)  I48.21 metoprolol succinate ER (TOPROL-XL) 50 MG 24 hr tablet   15. Heart failure with preserved ejection fraction, unspecified HF chronicity (H)  I50.30 metoprolol succinate ER (TOPROL-XL) 50 MG 24 hr tablet   16. Atrial fibrillation with controlled ventricular response (H)  I48.91 DISCONTINUED: metoprolol succinate ER (TOPROL-XL) 50 MG 24 hr tablet   17. Recurrent cold sores  B00.1 acyclovir (ZOVIRAX)  "400 MG tablet        R buttock/leg pain, chronic pain- very distressing pain for pt.  PT has not been helpful, knows surgery is not likely an option to help this pain, unsure if another injection would be helpful.  Willing to try medications at this point to help- has tried to avoid narcotics in past, but pain is so bad she's been trying them recently.    She has only taken the robaxin from Dr. Moore a bit, unsure if helpful.  She's taken norco (rx from Dr. Lopez) just a bit, and thinks it may help lessen the pain by a point or two.  --Pt has appt with Dr. Moore on Tues- will call to see if able to switch to in-person visit from virtual visit.  Discuss injection, potential pain meds, pool therapy option (referral given today, but unsure how to best facilitate if it is a good option).    Rheumatology f/u-   --Reviewed EKG from last visit with Dr. Lopez- Qtc okay, and rest of changes okay- no further work-up indicated based on EKG findings.  --Reviewed new gout medication pt will plan to use with next gout flare-anakinra.  --Prednisone- currently back down at 5mg/day.  The \"creeping crud' migrating inflammatory symptoms are still resolved with the prednisone/plaquenil.  --Pt interested in discussion of injection osteoporosis txt- will discuss with Dr. Lopez.    Bladder/rectal incontinence-  --Rec f/u with Dr. Nassar.  At last visit, they had thought issue was resolved- has not been resolved, now worsening.    GERD- pt will start pepcid prn next week.    DM II- A1C down to 7.1 today.  Did not discuss diabetes in depth today.    MDD- did not discuss much today, most distressed about her pain.  Will send in lexapro 20mg/d refills.  Reassured with okay Qtc on recent EKG.    HTN/Cardiology/a.fib/HF- lower BP today, but all BP meds through cardiology for a.fib and heart failure.  Pt due for f/u with cardiology in 8/21.  Okay to keep meds as they are for now unless BP going lower.  Of note, switching torsemide 30/40mg " am/pm doses has not seemed to make a difference in incontinence/nocturia for pt.        65 minutes spent on the date of the encounter doing chart review, review of outside records, review of test results, interpretation of tests, patient visit and documentation       Return in about 10 weeks (around 8/13/2021) for Routine Visit/Chronic Cares/Med Check, Medication review (45 min).    Ilene Tristan MD  [facility]              Subjective   Sister Betty is a 81 year old who presents for the following health issues - multiple issues.    HPI     Betty comes in with a list of concerns today, and is very distressed and frustrated about her R buttock/leg pain.      --Severe pain- R leg and buttocks, all the way down to her foot.  The pain is getting very depressing.  In the morning, she screams with the pain when she gets up.  She has been skeptical of the pain meds and muscle relaxants, but she has used them at times.  She had to take them yesterday (one norco and one robaxin), and this morning she had to take the norco so she could get in to this appointment.    Pain is 7-8/10 once she is moving, but times it flares to 10/10 pain.  Meds sometimes brings it down 1-3 points on pain scale- just subtle improvements in the pain.  Reports that Dr. Moore states that surgery would likely not be helpful.  She wonders if another injection would help.  Doing PT through the pain clinic- hasn't helped symptoms.  She's wondering about a pool therapy option- would like referral if possible.  Rx's- reviewed med list- the norco is from Dr. Lopez, robaxin from Dr. Moore.    *She has a virtual appt with Dr. Moore on Tues.  Will see if can be changed to in-person.  Will ask about pool therapy from his perspective as well, and about injections vs pain medication options.      --Gout- got new rx from Dr. Lopez - anakinra- hasn't used it yet.  Trying to avoid prednisone bursts for gout.  One prednisone bout  12/3/20.  9mg, back to 5mg from 1/10/21.  Will try and use the anakinra for next flare.      --DM II- wondering if pain can affect glucose as sugars were     --Severe burning on between groin and labia, bilaterally.  Acyclovir- taking that periodically.  Cream recommended from here- unsure name- has helped.  Sx's have been better.      ---GERD-   Pepcid- she has a note she should take it prn, and not daily.  Currently taking it once daily- morning.  Doesn't think she's been having any sx's for awhile.  Has meds set up for one more week- Yvette would like to keep those set.  *will start using the pepcid as needed after next week as trial.    ---Notes drug withdrawal symptoms when she went off the gabapentin- lost her appetite, chills, sweaty.  Even with going off slowly per Dr. Duarte.  Early 5/21.  2 wks of sx's.    ---Osteoporosis- doesn't like the fosamax on Mondays.  Takes her shots then.  Doesn't like her Mondays.  *They will ask Dr. Lopez about options for osteoporosis txt via monthly or yearly shots.      ---Bladder urgency-  Having more urinary and sometimes rectal incontinence, difficult to get to restroom in time once she has the urge.  Switched torsemide to take higher dose in am 40mg am, 30mg pm.  Has switched this a couple times now- unsure if helpful.  Doesn't think it's improved her nocutria.  Reviewed last urology note from ~12/19- they noted 'significant improvement in her symptoms' at that visit, but pt doesn't ever think that was the case (agree from her discussion of it over time).  *Rec f/u with Dr. Nassar to discuss not only persistent but worsening sx's, and alterations in toursemide dosing hasn't seemed to help.      Other chronic cares review...    ---EKG review- EKG completed at rheumatology visit on 5/3/21.  Sinus bradycardia with first degree AV block, QTc okay.  No further work-up needed.    --DM II- did not see due for A1C until pt had left.  Did get other labs today- will see if they can  add on A1C.  Pt did get sensation of low blood sugars during appt, appeared mentally slow, but revived quickly with crackers and apple juice.    --BP low today, but no orthostatic symptoms.    Pt is on BP meds through cardiology- toprol XL 62.5mg twice daily and torsemide, and spironolactone.  She last saw Dr. Rosa in 8/20, due for one year f/u with ECHO in 8/21.     (accidentally sent rx for toprol XL 50mg/d- cancelled - should come from cardiology).      Answers for HPI/ROS submitted by the patient on 6/4/2021   If you checked off any problems, how difficult have these problems made it for you to do your work, take care of things at home, or get along with other people?: Somewhat difficult  PHQ9 TOTAL SCORE: 7  DELORES 7 TOTAL SCORE: 2    Diabetes Follow-up  How often are you checking your blood sugar? Two times daily  Blood sugar testing frequency justification:  Uncontrolled diabetes  What time of day are you checking your blood sugars (select all that apply)?  Before meals  Have you had any blood sugars above 200?  No  Have you had any blood sugars below 70?  No    What symptoms do you notice when your blood sugar is low?  None    What concerns do you have today about your diabetes? Other: does pain ever affect blood sugar      Do you have any of these symptoms? (Select all that apply)  Numbness in feet and Burning in feet        Hyperlipidemia Follow-Up    Are you regularly taking any medication or supplement to lower your cholesterol?   Yes- Lipitor     Are you having muscle aches or other side effects that you think could be caused by your cholesterol lowering medication?  No    Hypertension Follow-up    Do you check your blood pressure regularly outside of the clinic? No     Are you following a low salt diet? Yes    Are your blood pressures ever more than 140 on the top number (systolic) OR more   than 90 on the bottom number (diastolic), for example 140/90? Yes    BP Readings from Last 2 Encounters:  "  06/04/21 105/49   04/01/21 136/88     Hemoglobin A1C (%)   Date Value   06/04/2021 7.1 (H)   03/03/2021 8.0 (H)     LDL Cholesterol Calculated (mg/dL)   Date Value   10/20/2020 19   01/21/2020 1         How many servings of fruits and vegetables do you eat daily?  1 serving     On average, how many sweetened beverages do you drink each day (Examples: soda, juice, sweet tea, etc.  Do NOT count diet or artificially sweetened beverages)?   0    How many days per week do you exercise enough to make your heart beat faster?     How many minutes a day do you exercise enough to make your heart beat faster?     How many days per week do you miss taking your medication? 0    Pain History:  When did you first notice your pain? - More than 6 weeks   Have you seen this provider for your pain in the past?   Yes   Where in your body do you have pain? Right leg, buttocks and hip  Are you seeing anyone else for your pain? Yes - Dr. Moore and Dr. Lopez.    PHQ-9 SCORE 5/15/2020 12/17/2020 6/4/2021   PHQ-9 Total Score - - -   PHQ-9 Total Score MyChart - 4 (Minimal depression) 7 (Mild depression)   PHQ-9 Total Score 4 4 7       PDMP Review     None        Last CSA Agreement:   Patient-Level CSA:    There are no patient-level csa.         Review of Systems   Constitutional, HEENT, cardiovascular, pulmonary, gi and gu systems are negative, except as otherwise noted.        Objective    /49   Pulse 59   Temp 97.3  F (36.3  C) (Skin)   Resp 17   Ht 1.702 m (5' 7\")   Wt 90.7 kg (200 lb)   SpO2 94%   BMI 31.32 kg/m    Body mass index is 31.32 kg/m .  Physical Exam   GENERAL APPEARANCE: healthy, alert and no distress     EYES: PERRL, sclera clear     HENT: nose and mouth without ulcers or lesions     NECK: no adenopathy, no asymmetry, masses, or scars and thyroid normal to palpation     RESP: lungs clear to auscultation - no rales, rhonchi or wheezes     CV: regular rates and rhythm, normal S1 S2, no S3 or S4 and no murmur, " click or rub      Abdomen: soft, nontender, no HSM or masses and bowel sounds normal     Ext: warm, dry, no edema      SKIN: groin area skin clear without any signs of rash, lesions or erythema    Orders Only on 05/03/2021   Component Date Value Ref Range Status     Interpretation ECG 05/03/2021 Click View Image link to view waveform and result   Final     Results for orders placed or performed in visit on 06/04/21   Basic metabolic panel     Status: Abnormal   Result Value Ref Range    Sodium 137 133 - 144 mmol/L    Potassium 4.0 3.4 - 5.3 mmol/L    Chloride 106 94 - 109 mmol/L    Carbon Dioxide 25 20 - 32 mmol/L    Anion Gap 6 3 - 14 mmol/L    Glucose 142 (H) 70 - 99 mg/dL    Urea Nitrogen 14 7 - 30 mg/dL    Creatinine 1.11 (H) 0.52 - 1.04 mg/dL    GFR Estimate 47 (L) >60 mL/min/[1.73_m2]    GFR Estimate If Black 54 (L) >60 mL/min/[1.73_m2]    Calcium 8.8 8.5 - 10.1 mg/dL   ALT     Status: None   Result Value Ref Range    ALT 25 0 - 50 U/L   Uric acid     Status: Abnormal   Result Value Ref Range    Uric Acid 6.2 (H) 2.6 - 6.0 mg/dL   CRP inflammation     Status: None   Result Value Ref Range    CRP Inflammation 3.0 0.0 - 8.0 mg/L   ESR     Status: None   Result Value Ref Range    Sed Rate 10 0 - 30 mm/h   CBC with platelets differential     Status: Abnormal   Result Value Ref Range    WBC 8.6 4.0 - 11.0 10e9/L    RBC Count 4.46 3.8 - 5.2 10e12/L    Hemoglobin 13.4 11.7 - 15.7 g/dL    Hematocrit 42.1 35.0 - 47.0 %    MCV 94 78 - 100 fl    MCH 30.0 26.5 - 33.0 pg    MCHC 31.8 31.5 - 36.5 g/dL    RDW 15.5 (H) 10.0 - 15.0 %    Platelet Count 198 150 - 450 10e9/L    % Neutrophils 76.1 %    % Lymphocytes 11.9 %    % Monocytes 10.4 %    % Eosinophils 1.2 %    % Basophils 0.4 %    Absolute Neutrophil 6.5 1.6 - 8.3 10e9/L    Absolute Lymphocytes 1.0 0.8 - 5.3 10e9/L    Absolute Monocytes 0.9 0.0 - 1.3 10e9/L    Absolute Eosinophils 0.1 0.0 - 0.7 10e9/L    Absolute Basophils 0.0 0.0 - 0.2 10e9/L    Diff Method Automated  Method    AST     Status: None   Result Value Ref Range    AST 17 0 - 45 U/L   Albumin level     Status: None   Result Value Ref Range    Albumin 3.5 3.4 - 5.0 g/dL   Hemoglobin A1c     Status: Abnormal   Result Value Ref Range    Hemoglobin A1C 7.1 (H) 0 - 5.6 %

## 2021-06-02 NOTE — TELEPHONE ENCOUNTER
CW,   Please advise on refill at upcoming appt  Thanks,  Lydia HALEY RN    Next 5 appointments (look out 90 days)    Jun 04, 2021  1:00 PM  Office Visit with Ilene Tristan MD  [facility] ([facility] ) [address]  [facility] [id number] [phone]

## 2021-06-04 ENCOUNTER — OFFICE VISIT (OUTPATIENT)
Dept: FAMILY MEDICINE | Facility: CLINIC | Age: 81
End: 2021-06-04
Payer: MEDICARE

## 2021-06-04 VITALS
BODY MASS INDEX: 31.39 KG/M2 | DIASTOLIC BLOOD PRESSURE: 49 MMHG | SYSTOLIC BLOOD PRESSURE: 105 MMHG | WEIGHT: 200 LBS | RESPIRATION RATE: 17 BRPM | HEIGHT: 67 IN | TEMPERATURE: 97.3 F | OXYGEN SATURATION: 94 % | HEART RATE: 59 BPM

## 2021-06-04 DIAGNOSIS — M10.9 GOUT, UNSPECIFIED CAUSE, UNSPECIFIED CHRONICITY, UNSPECIFIED SITE: ICD-10-CM

## 2021-06-04 DIAGNOSIS — E11.65 TYPE 2 DIABETES MELLITUS WITH HYPERGLYCEMIA, WITH LONG-TERM CURRENT USE OF INSULIN (H): ICD-10-CM

## 2021-06-04 DIAGNOSIS — Z79.4 TYPE 2 DIABETES MELLITUS WITH HYPERGLYCEMIA, WITH LONG-TERM CURRENT USE OF INSULIN (H): ICD-10-CM

## 2021-06-04 DIAGNOSIS — I48.21 PERMANENT ATRIAL FIBRILLATION (H): ICD-10-CM

## 2021-06-04 DIAGNOSIS — M16.11 OSTEOARTHRITIS OF RIGHT HIP, UNSPECIFIED OSTEOARTHRITIS TYPE: ICD-10-CM

## 2021-06-04 DIAGNOSIS — M06.00 SERONEGATIVE RHEUMATOID ARTHRITIS (H): ICD-10-CM

## 2021-06-04 DIAGNOSIS — G89.29 OTHER CHRONIC PAIN: ICD-10-CM

## 2021-06-04 DIAGNOSIS — I50.30 HEART FAILURE WITH PRESERVED EJECTION FRACTION, UNSPECIFIED HF CHRONICITY (H): ICD-10-CM

## 2021-06-04 DIAGNOSIS — F33.1 MAJOR DEPRESSIVE DISORDER, RECURRENT EPISODE, MODERATE (H): ICD-10-CM

## 2021-06-04 DIAGNOSIS — M35.3 POLYMYALGIA RHEUMATICA (H): ICD-10-CM

## 2021-06-04 DIAGNOSIS — M19.90 INFLAMMATORY ARTHRITIS: ICD-10-CM

## 2021-06-04 DIAGNOSIS — N18.9 CHRONIC KIDNEY DISEASE, UNSPECIFIED CKD STAGE: ICD-10-CM

## 2021-06-04 DIAGNOSIS — K21.9 GASTROESOPHAGEAL REFLUX DISEASE WITHOUT ESOPHAGITIS: ICD-10-CM

## 2021-06-04 DIAGNOSIS — Z79.899 LONG-TERM USE OF PLAQUENIL: ICD-10-CM

## 2021-06-04 DIAGNOSIS — B00.1 RECURRENT COLD SORES: ICD-10-CM

## 2021-06-04 DIAGNOSIS — I48.91 ATRIAL FIBRILLATION WITH CONTROLLED VENTRICULAR RESPONSE (H): ICD-10-CM

## 2021-06-04 DIAGNOSIS — G57.01 PIRIFORMIS SYNDROME, RIGHT: Primary | ICD-10-CM

## 2021-06-04 DIAGNOSIS — N39.41 URGE INCONTINENCE OF URINE: ICD-10-CM

## 2021-06-04 LAB
BASOPHILS # BLD AUTO: 0 10E9/L (ref 0–0.2)
BASOPHILS NFR BLD AUTO: 0.4 %
CRP SERPL-MCNC: 3 MG/L (ref 0–8)
DIFFERENTIAL METHOD BLD: ABNORMAL
EOSINOPHIL # BLD AUTO: 0.1 10E9/L (ref 0–0.7)
EOSINOPHIL NFR BLD AUTO: 1.2 %
ERYTHROCYTE [DISTWIDTH] IN BLOOD BY AUTOMATED COUNT: 15.5 % (ref 10–15)
ERYTHROCYTE [SEDIMENTATION RATE] IN BLOOD BY WESTERGREN METHOD: 10 MM/H (ref 0–30)
HBA1C MFR BLD: 7.1 % (ref 0–5.6)
HCT VFR BLD AUTO: 42.1 % (ref 35–47)
HGB BLD-MCNC: 13.4 G/DL (ref 11.7–15.7)
LYMPHOCYTES # BLD AUTO: 1 10E9/L (ref 0.8–5.3)
LYMPHOCYTES NFR BLD AUTO: 11.9 %
MCH RBC QN AUTO: 30 PG (ref 26.5–33)
MCHC RBC AUTO-ENTMCNC: 31.8 G/DL (ref 31.5–36.5)
MCV RBC AUTO: 94 FL (ref 78–100)
MONOCYTES # BLD AUTO: 0.9 10E9/L (ref 0–1.3)
MONOCYTES NFR BLD AUTO: 10.4 %
NEUTROPHILS # BLD AUTO: 6.5 10E9/L (ref 1.6–8.3)
NEUTROPHILS NFR BLD AUTO: 76.1 %
PLATELET # BLD AUTO: 198 10E9/L (ref 150–450)
RBC # BLD AUTO: 4.46 10E12/L (ref 3.8–5.2)
WBC # BLD AUTO: 8.6 10E9/L (ref 4–11)

## 2021-06-04 PROCEDURE — 82040 ASSAY OF SERUM ALBUMIN: CPT | Performed by: INTERNAL MEDICINE

## 2021-06-04 PROCEDURE — 83036 HEMOGLOBIN GLYCOSYLATED A1C: CPT | Performed by: FAMILY MEDICINE

## 2021-06-04 PROCEDURE — 85652 RBC SED RATE AUTOMATED: CPT | Performed by: INTERNAL MEDICINE

## 2021-06-04 PROCEDURE — 85025 COMPLETE CBC W/AUTO DIFF WBC: CPT | Performed by: INTERNAL MEDICINE

## 2021-06-04 PROCEDURE — 99215 OFFICE O/P EST HI 40 MIN: CPT | Performed by: FAMILY MEDICINE

## 2021-06-04 PROCEDURE — 84550 ASSAY OF BLOOD/URIC ACID: CPT | Performed by: INTERNAL MEDICINE

## 2021-06-04 PROCEDURE — 86140 C-REACTIVE PROTEIN: CPT | Performed by: INTERNAL MEDICINE

## 2021-06-04 PROCEDURE — 80048 BASIC METABOLIC PNL TOTAL CA: CPT | Performed by: INTERNAL MEDICINE

## 2021-06-04 PROCEDURE — 36415 COLL VENOUS BLD VENIPUNCTURE: CPT | Performed by: INTERNAL MEDICINE

## 2021-06-04 PROCEDURE — 84450 TRANSFERASE (AST) (SGOT): CPT | Performed by: INTERNAL MEDICINE

## 2021-06-04 PROCEDURE — 84460 ALANINE AMINO (ALT) (SGPT): CPT | Performed by: INTERNAL MEDICINE

## 2021-06-04 RX ORDER — ESCITALOPRAM OXALATE 20 MG/1
20 TABLET ORAL DAILY
Qty: 90 TABLET | Refills: 0 | Status: SHIPPED | OUTPATIENT
Start: 2021-06-04 | End: 2021-07-23

## 2021-06-04 RX ORDER — ESCITALOPRAM OXALATE 20 MG/1
TABLET ORAL
Qty: 90 TABLET | Refills: 0 | Status: SHIPPED | OUTPATIENT
Start: 2021-06-04 | End: 2021-10-04

## 2021-06-04 RX ORDER — METOPROLOL SUCCINATE 50 MG/1
50 TABLET, EXTENDED RELEASE ORAL DAILY
COMMUNITY
Start: 2021-06-04 | End: 2022-01-05

## 2021-06-04 RX ORDER — METOPROLOL SUCCINATE 50 MG/1
TABLET, EXTENDED RELEASE ORAL
Qty: 180 TABLET | Refills: 1 | Status: SHIPPED | OUTPATIENT
Start: 2021-06-04 | End: 2021-06-04

## 2021-06-04 RX ORDER — ACYCLOVIR 400 MG/1
400 TABLET ORAL 3 TIMES DAILY
Qty: 15 TABLET | Refills: 2 | Status: SHIPPED | OUTPATIENT
Start: 2021-06-04 | End: 2022-02-23

## 2021-06-04 ASSESSMENT — ANXIETY QUESTIONNAIRES
GAD7 TOTAL SCORE: 2
7. FEELING AFRAID AS IF SOMETHING AWFUL MIGHT HAPPEN: NOT AT ALL
1. FEELING NERVOUS, ANXIOUS, OR ON EDGE: SEVERAL DAYS
4. TROUBLE RELAXING: NOT AT ALL
2. NOT BEING ABLE TO STOP OR CONTROL WORRYING: NOT AT ALL
5. BEING SO RESTLESS THAT IT IS HARD TO SIT STILL: NOT AT ALL
GAD7 TOTAL SCORE: 2
3. WORRYING TOO MUCH ABOUT DIFFERENT THINGS: NOT AT ALL
7. FEELING AFRAID AS IF SOMETHING AWFUL MIGHT HAPPEN: NOT AT ALL
GAD7 TOTAL SCORE: 2
6. BECOMING EASILY ANNOYED OR IRRITABLE: SEVERAL DAYS

## 2021-06-04 ASSESSMENT — PATIENT HEALTH QUESTIONNAIRE - PHQ9
SUM OF ALL RESPONSES TO PHQ QUESTIONS 1-9: 7
10. IF YOU CHECKED OFF ANY PROBLEMS, HOW DIFFICULT HAVE THESE PROBLEMS MADE IT FOR YOU TO DO YOUR WORK, TAKE CARE OF THINGS AT HOME, OR GET ALONG WITH OTHER PEOPLE: SOMEWHAT DIFFICULT
SUM OF ALL RESPONSES TO PHQ QUESTIONS 1-9: 7

## 2021-06-04 ASSESSMENT — MIFFLIN-ST. JEOR: SCORE: 1404.82

## 2021-06-05 LAB
ALBUMIN SERPL-MCNC: 3.5 G/DL (ref 3.4–5)
ALT SERPL W P-5'-P-CCNC: 25 U/L (ref 0–50)
ANION GAP SERPL CALCULATED.3IONS-SCNC: 6 MMOL/L (ref 3–14)
AST SERPL W P-5'-P-CCNC: 17 U/L (ref 0–45)
BUN SERPL-MCNC: 14 MG/DL (ref 7–30)
CALCIUM SERPL-MCNC: 8.8 MG/DL (ref 8.5–10.1)
CHLORIDE SERPL-SCNC: 106 MMOL/L (ref 94–109)
CO2 SERPL-SCNC: 25 MMOL/L (ref 20–32)
CREAT SERPL-MCNC: 1.11 MG/DL (ref 0.52–1.04)
GFR SERPL CREATININE-BSD FRML MDRD: 47 ML/MIN/{1.73_M2}
GLUCOSE SERPL-MCNC: 142 MG/DL (ref 70–99)
POTASSIUM SERPL-SCNC: 4 MMOL/L (ref 3.4–5.3)
SODIUM SERPL-SCNC: 137 MMOL/L (ref 133–144)
URATE SERPL-MCNC: 6.2 MG/DL (ref 2.6–6)

## 2021-06-05 ASSESSMENT — PATIENT HEALTH QUESTIONNAIRE - PHQ9: SUM OF ALL RESPONSES TO PHQ QUESTIONS 1-9: 7

## 2021-06-05 ASSESSMENT — ANXIETY QUESTIONNAIRES: GAD7 TOTAL SCORE: 2

## 2021-06-08 ENCOUNTER — VIRTUAL VISIT (OUTPATIENT)
Dept: PALLIATIVE MEDICINE | Facility: CLINIC | Age: 81
End: 2021-06-08
Payer: MEDICARE

## 2021-06-08 DIAGNOSIS — M47.816 SPONDYLOSIS OF LUMBAR REGION WITHOUT MYELOPATHY OR RADICULOPATHY: ICD-10-CM

## 2021-06-08 DIAGNOSIS — G57.01 PIRIFORMIS SYNDROME, RIGHT: Primary | ICD-10-CM

## 2021-06-08 PROCEDURE — 99213 OFFICE O/P EST LOW 20 MIN: CPT | Mod: 95 | Performed by: PHYSICAL MEDICINE & REHABILITATION

## 2021-06-08 ASSESSMENT — PAIN SCALES - GENERAL: PAINLEVEL: SEVERE PAIN (7)

## 2021-06-08 NOTE — PATIENT INSTRUCTIONS
1. Try tramadol 50mg twice daily as needed one day then try Norco 7.5-325mg twice daily as needed the next day. Whichever is more effective should be the medication we continue. Call [phone] and let us know how this worked and which medication was more effective  2. Continue methocarbamol 750mg twice daily as needed for cramping buttock/hip pain. Alternate with tramadol and norco.  3. Continue tylenol 1000mg three times daily as needed.  4. I ordered a piriformis injection, you'll be called to schedule.  5. Call [facility]e [facility] [employer] [facility] [facility] for [facility] options.    ----------------------------------------------------------------  Clinic Number:  [phone]     Call with any questions about your care and for scheduling assistance.     Calls are returned Monday through Friday between 8 AM and 4:30 PM. We usually get back to you within 2 business days depending on the issue/request.    If we are prescribing your medications:    For opioid medication refills, call the clinic or send a [employer] message 7 days in advance.  Please include:    Name of requested medication    Name of the pharmacy.    For non-opioid medications, call your pharmacy directly to request a refill. Please allow 3-4 days to be processed.     Per [place] State Law:    All controlled substance prescriptions must be filled within 30 days of being written.      For those controlled substances allowing refills, pickup must occur within 30 days of last fill.      We believe regular attendance is key to your success in our program!      Any time you are unable to keep your appointment we ask that you call us at least 24 hours in advance to cancel.This will allow us to offer the appointment time to another patient.     Multiple missed appointments may lead to dismissal from the clinic.

## 2021-06-08 NOTE — RESULT ENCOUNTER NOTE
TC team- could you make sure they get her appt moved up to August?  45 minute appt.   Thanks!  CW      Here are your lab results from your recent visit...  --Your hemoglobin A1C (the three month average of your glucose levels) looks improved!  It's down from 8.0 to 7.1 which is great.    I did see that your next appointment is scheduled in October, unfortunately.  I thought we talked about a 2-3 month follow-up appointment?  Could you try and move that up to August?  Still a 45 minute appointment...    Best,   Lev Tristan MD

## 2021-06-08 NOTE — PROGRESS NOTES
Sister Betty is a 80 year old who is being evaluated via a billable video visit.      How would you like to obtain your AVS? Mail a copy  If the video visit is dropped, the invitation should be resent by: Other e-mail: Chel  Will anyone else be joining your video visit? Yes: Nghia ALLEN (healthcare agent) and Sister Yvette. How would they like to receive their invitation? Other e-mail: RENEE Katz [facility]                                [facility]    Date of visit: 4/27/21       Assessment:  Sister Betty Buckner is a 80 year old medically complex patient with past medical history including: Atrial fibrillation, VLH, History of DVT, CHF, HLD, HTN, DM 2, Stage 3 CKD, RLS, Depression, History of etoh abuse (remission 30+ years) who presents for evaluation and treatment of the following chronic pain conditions:     1. Right sided hip and leg pain: Patient describes a long standing history of right hip and leg pain with exacerbation in the past year. She reports pain that starts in her right low back/upper buttock and radiates laterally and posteriorly down the right leg to the foot.  Based on this it appears her symptoms are due to a combination of lumbar spondylosis, right gluteal dysfunction and piriformis syndrome.    MRI was independently reviewed and concerning for right S1 nerve root abutment /impingement, no improvement noted with a S1 liliya. Due to ongoing buttock, hip and right leg radiating pain we tried a piriformis injection which resulted in about 60% improvement in her symptoms which lasted about 8 weeks before gradually returning. Currently having a flare-up after PT last week. Repeat piriformis injection ordered.            Plan:  The following recommendations were given to the patient. Diagnosis, treatment options, risks, benefits, and alternatives were discussed, and all questions were answered. The patient expressed  understanding of the plan for management.      I am recommending a multidisciplinary treatment plan to help this patient better manage her pain.  This includes:      1. Physical Therapy: Continue PT. Patient will call [facility] to see if they have pool therapy services available.  2. Clinical Health Pain Psychologist: Coping well, baseline memory issues, likely to be of limited benefit.  3. Diagnostic Studies: None at this time.  4. Medication Management:   1. Try tramadol 50mg bid prn one day then try Norco 7.5-325mg BID prn the next day. Whichever is more effective should be the medication we continue. Call [phone] and let us know how this worked.  2. Continue methocarbamol 750mg twice daily as needed for cramping buttock/hip pain. Alternate with tramadol and norco.  3. Continue tylenol 1000mg TID prn.  5. Further procedures recommended: right piriformis injection ordered.  6. Follow up: 6 week virtual visit.      DO [facility]           Chief complaint:   Chief Complaint   Patient presents with     Pain       Interval history:  Betty Tee is a 80 year old female last seen by me on 3/22/21.        Since her last visit, Betty Tee reports:  -She had a right piriformis steroid injection on 4/1/21 with some improvement in her pain that has gradually worn off in the past couple weeks.    -She is having a flare up of her pain in the past week and a half after a PT session last week.    -She took one norco and one methocarbamol this morning. This has reduced the intensity but the pain persists.    -she is currently working with PT and doing the exercises recommended.      Pain scores:  Pain intensity on average is 10 on a scale of 0-10.        Pain Treatments:  1. Medications:       Current pain medications:  -Tylenol 1000mg q8h prn  -Escitalopram 20mg daily       Previous pain medications:  -Tramadol 50mg prn  -Norco 7.5-325mg  prn  -Tizanidien, flexeril - nh   -Gabapentin 300mg TID - memory difficulty  2. Physical Therapy: hasn't completed PT for low back or hip pain                TENS unit: hasn't tried  3. Pain psychology: hasn't tried  4. Surgery: Lumbar spine surgery in the 1960s.  5. Injections:   -Right piriformis injection on 4/1/21 with 60% relief for 8 weeks.  -Two epidural injections (right L5 tfesi), feb 2020 was helpful the next day but she had a fall right after, aug 2020 procedure - not helpful, had worsening pain  6. Alternative Therapies:               Chiropractic: hasn't tried               Acupuncture: hasn't tried          Side Effects: no side effect    Medications:  Current Outpatient Medications   Medication Sig Dispense Refill     acetaminophen (TYLENOL) 500 MG tablet Take 1,000 mg by mouth every 8 hours as needed (max 6 tablets/24 hours, 2 tablets/dose)        acyclovir (ZOVIRAX) 400 MG tablet Take 1 tablet (400 mg) by mouth 3 times daily For a couple days 15 tablet 2     acyclovir (ZOVIRAX) 5 % external ointment Apply topically 6 times daily As needed for outbreaks 15 g 3     albuterol (PROAIR HFA, PROVENTIL HFA, VENTOLIN HFA) 108 (90 BASE) MCG/ACT inhaler Inhale 2 puffs into the lungs every 6 hours 1 Inhaler 3     alendronate (FOSAMAX) 70 MG tablet Take 1 tablet (70 mg) by mouth every 7 days 12 tablet 2     anakinra (KINERET) 100 MG/0.67ML SOSY injection 100 mg every day x 3 days as needed for flare ups 6.7 mL 3     atorvastatin (LIPITOR) 10 MG tablet TAKE 1/2 TABLET BY MOUTH EVERY DAY 45 tablet 3     azithromycin (ZITHROMAX) 250 MG tablet Take 1 tablet (250 mg) by mouth daily 30 tablet 11     BD JANE U/F 32G X 4 MM insulin pen needle USE 4 DAILY AS DIRECTED. 400 each 0     cephALEXin (KEFLEX) 250 MG capsule Take 1 capsule (250 mg) by mouth every 6 hours (Patient not taking: Reported on 6/4/2021) 20 capsule 0     cevimeline (EVOXAC) 30 MG capsule Take 1 capsule (30 mg) by mouth 3 times daily 270 capsule 2      escitalopram (LEXAPRO) 20 MG tablet TAKE 1 TABLET BY MOUTH EVERY DAY 90 tablet 0     escitalopram (LEXAPRO) 20 MG tablet Take 1 tablet (20 mg) by mouth daily 90 tablet 0     famotidine (PEPCID) 10 MG tablet Take 10 mg by mouth 2 times daily       fluticasone (FLONASE) 50 MCG/ACT nasal spray Spray 1-2 sprays into both nostrils daily as needed for allergies 18.2 mL 11     fluticasone-vilanterol (BREO ELLIPTA) 100-25 MCG/INH inhaler Inhale 1 puff into the lungs daily 1 Inhaler 11     HYDROcodone-acetaminophen (NORCO) 7.5-325 MG per tablet Take 1 tablet by mouth every 12 hours 60 tablet 0     hydroxychloroquine (PLAQUENIL) 200 MG tablet Take 2 tablets (400 mg) by mouth daily Annual Plaquenil toxicity eye screening required. 180 tablet 1     insulin aspart (NOVOLOG FLEXPEN) 100 UNIT/ML pen INJECT 12 UNITS SUBCUTANEOUS 3 TIMES DAILY (WITH MEALS) 15 mL 2     insulin glargine (BASAGLAR KWIKPEN) 100 UNIT/ML pen INJECT 22 UNITS SUBCUTANEOUS DAILY (TO REPLACE LANTUS) 15 mL 0     lidocaine (LIDODERM) 5 % patch APPLY PATCH TO PAINFUL AREA FOR UP TO 12 H WITHIN A 24 H PERIOD. REMOVE AFTER 12 HOURS. 30 patch 2     loratadine (CLARITIN) 10 MG tablet TAKE 1 TABLET BY MOUTH EVERY DAY 90 tablet 1     methocarbamol (ROBAXIN) 750 MG tablet Take 1 tablet (750 mg) by mouth 2 times daily as needed for muscle spasms 60 tablet 0     metoprolol succinate ER (TOPROL-XL) 25 MG 24 hr tablet TAKE 1/2 TABLET BY MOUTH 2 TIMES DAILY WITH 50 MG FOR A TOTAL OF 62.5 TWICE A DAY 90 tablet 1     metoprolol succinate ER (TOPROL-XL) 50 MG 24 hr tablet Take 1 tablet (50 mg) by mouth daily (in combination with 12.5mg for a total of 62.5mg twice a day)       montelukast (SINGULAIR) 10 MG tablet TAKE 1 TABLET BY MOUTH EVERYDAY AT BEDTIME 90 tablet 0     OZEMPIC, 1 MG/DOSE, 2 MG/1.5ML pen INJECT 1 MG SUBCUTANEOUS EVERY 7 DAYS 3 mL 0     potassium chloride ER (KLOR-CON) 20 MEQ CR tablet Take 2 tablets (40 mEq) by mouth every morning AND 1 tablet (20 mEq) every  evening. 270 tablet 3     predniSONE (DELTASONE) 5 MG tablet Take 1 tablet (5 mg) by mouth daily 30 tablet 0     rivaroxaban ANTICOAGULANT (XARELTO ANTICOAGULANT) 20 MG TABS tablet Take 1 tablet (20 mg) by mouth daily (with dinner) 90 tablet 2     spironolactone (ALDACTONE) 25 MG tablet Take 2 tablets (50 mg) by mouth daily 180 tablet 3     torsemide (DEMADEX) 10 MG tablet TAKE ONE TAB (10 MG) WITH ONE 20 MG TAB TO = 30 MG DAILY IN AFTERNOON. 90 tablet 0     torsemide (DEMADEX) 20 MG tablet TAKE 2 TABS (40 MG) IN AM DAILY AND TAKE 1 TAB BY MOUTH IN THE AFTERNOON ALONG W/ A 10 MG  tablet 4     traZODone (DESYREL) 50 MG tablet TAKE 0.5-1.5 TABLETS (25-75 MG) BY MOUTH NIGHTLY AS NEEDED FOR SLEEP 135 tablet 0     vitamin D3 (CHOLECALCIFEROL) 50 mcg (2000 units) tablet Take 1 tablet (50 mcg) by mouth daily 90 tablet 2       Medical History: any changes in medical history since they were last seen? No    Review of Systems:  The 14 system ROS was reviewed and is positive for:  diabetes, gout, back pain, arthritis, paresthesias  Any bowel or bladder problems: frequency, urgency  Mood: denies        Video Start Time: 100PM  Video-Visit Details    Type of service:  Video Visit    Video End Time:1:16 PM    Originating Location (pt. Location): Home    Distant Location (provider location):  [facility] [place]     Platform used for Video Visit: [employer]      BILLING TIME DOCUMENTATION:   The total TIME spent on this patient on the date of the encounter/appointment was 22 minutes.      TOTAL TIME includes:   Time spent preparing to see the patient (reviewing records and tests)   Time spent face to face (or over the phone) with the patient   Time spent ordering tests, medications, procedures and referrals   Time spent documenting clinical information in Epic         Charly Moore  [facility]

## 2021-06-09 ENCOUNTER — THERAPY VISIT (OUTPATIENT)
Dept: PHYSICAL THERAPY | Facility: CLINIC | Age: 81
End: 2021-06-09
Payer: MEDICARE

## 2021-06-09 ENCOUNTER — TELEPHONE (OUTPATIENT)
Dept: PALLIATIVE MEDICINE | Facility: CLINIC | Age: 81
End: 2021-06-09

## 2021-06-09 DIAGNOSIS — M47.816 SPONDYLOSIS OF LUMBAR REGION WITHOUT MYELOPATHY OR RADICULOPATHY: ICD-10-CM

## 2021-06-09 DIAGNOSIS — M54.9 BACK PAIN: Primary | ICD-10-CM

## 2021-06-09 DIAGNOSIS — G57.01 PIRIFORMIS SYNDROME, RIGHT: Primary | ICD-10-CM

## 2021-06-09 PROCEDURE — 97140 MANUAL THERAPY 1/> REGIONS: CPT | Mod: GP | Performed by: PHYSICAL THERAPIST

## 2021-06-09 PROCEDURE — 97110 THERAPEUTIC EXERCISES: CPT | Mod: GP | Performed by: PHYSICAL THERAPIST

## 2021-06-09 NOTE — TELEPHONE ENCOUNTER
Referral placed, will forward to MA team to print and fax to [facility] in [place].    Charly Moore DO  [facility]

## 2021-06-09 NOTE — TELEPHONE ENCOUNTER
Patient called and stated she would like her referral for pool therapy sent to [facility] in [place]         Sowmya Mack    [facility]

## 2021-06-09 NOTE — PROGRESS NOTES
Subjective:  HPI  Physical Exam                    Objective:  System    Physical Exam    General     ROS    Assessment/Plan:    DISCHARGE REPORT    Progress reporting period is from eval date to 6/9/21.     SUBJECTIVE  Patient saw MD - no real improvement in her pain thus far. The plan is to get a piriformis injection and begin therapy at [facility], as well as make appt with the pain center.       OBJECTIVE  Very tender to palpation L glute med/piriformis and TFL/IT band. Ambulates to clinic with FWW and antalgic gait pattern.        ASSESSMENT/PLAN  Diagnosis 1:  Right Hip Pain  Pain -  manual therapy, self management, education and home program  Decreased ROM/flexibility - manual therapy and therapeutic exercise  Decreased strength - therapeutic exercise and therapeutic activities  Impaired gait - gait training  STG/LTGs have been met or progress has been made towards goals:  Yes (See Goal flow sheet completed today.)  Assessment of Progress: The patient's condition has potential to improve.  The patient has had set backs in their progress.  The patient's condition has exacerbated.  Self Management Plans:  Patient has been instructed in a home treatment program.  Patient is independent in a home treatment program.  Patient  has been instructed in self management of symptoms.  Patient is independent in self management of symptoms.  Betty continues to require the following intervention to meet STG and LTG's: PT intervention is no longer required to meet STG/LTG.  We will discharge this patient from PT.    Recommendations:  This patient is ready to be discharged from this facility. She would benefit from pool therapy.   This patient would benefit from further evaluation.    Please refer to the daily flowsheet for treatment today, total treatment time and time spent performing 1:1 timed codes.

## 2021-06-10 NOTE — PROGRESS NOTES
[facility]      Facility:    [facility] [[id number]]  Code Status: UNKNOWN      Chief Complaint/Reason for Visit:  Chief Complaint   Patient presents with     H & P     Diabetes uncontrolled, fever, atrial fibrillation with rapid ventricular response, pneumonia, acute respiratory failure,.       HPI:   Betty is a 76 y.o. female who was recently admitted to the hospital on 4/2/2017 with history of diet type 2 diabetes in atrial fibrillation with rapid ventricular response.  She was admitted with altered mental status and for evaluation of cough dizziness and fever.  She was found initially to have influenza and she did develop acute hypoxic respiratory failure secondary to pulmonary congestion.  She was admitted to the hospital and diagnosed with community-acquired pneumonia.  She was on antibiotics and started on home steroids were increased she was also given nebulizer treatments and fluids as well as oxygen.  She did not require any intubation and she did improve.  Blood cultures and white count as well as pro-calcitonin did not suggest bacterial infection and patient was started on Tamiflu.  She was continued on antibiotics throughout her hospital admission secondary to the severity of her respiratory illness.  Her oxygen requirements decreased after her diuresis and patient no longer required oxygen with walking.  She was discharged on her home regime for heart failure including Lasix 40 mg twice daily and spironolactone 25 mg daily as well as metoprolol.  She did receive a cardiac workup in the hospital and a transthoracic echocardiogram showed akinesis of the anterior septal wall.  This was likely due to atrial fibrillation with rapid ventricular response and no further workup.  Medical management only.  Her dizziness as well has her altered mental status did improve shortly after admission and the etiology was likely multifactorial with hypoxemia and acute respiratory  illness.  Her acute coronary syndrome workup was negative.  Patient is anticoagulated at this time in her A. fib was in good rate control.  Her diabetes in the hospital was poorly controlled most likely due to steroids.  She was discharged on 23 units of Lantus and 10 units of low NovoLog with each meal with metformin 1000 mg daily.  She did receive diabetic education in the hospital.  Her Sjogren syndrome remained stable in the hospital.  She was treated appropriately and transferred here to the TCU at [facility] in stable condition.    Patient complains today she has pretty much fully recovered and she is ambulatory down the halls.  She still has some shortness of breath and she has had central chest tightness that she has had for some time.  The workup in the hospital was negative for acute coronary syndrome and she continues to have this.  She does seem euvolemic at this time and there is no signs of any fluid overload.  She is sitting up at the side of the bed and she is ready to go home in therapies recommend her going home today.  Her blood sugars are still in some poor control however she is on the steroids and I will send her home on the same regime and she will follow-up with her primary care physician in 1 week with her blood sugars.    Past Medical History:  Past Medical History:   Diagnosis Date     (HFpEF) heart failure with preserved ejection fraction      Acute and chronic respiratory failure with hypoxia      Alcohol abuse, in remission      Atrial fibrillation with controlled ventricular response      Breast fibroadenoma      Colouterine fistula      Diabetes mellitus, type 2      Disorder of bone and cartilage      Diverticulitis of colon      DVT (deep venous thrombosis)      Enlarged lymph node      Fatty liver disease, nonalcoholic      GERD (gastroesophageal reflux disease)      HTN (hypertension)      ILD (interstitial lung disease)      Insomnia      Left ventricular hypertrophy       Lower GI bleed      Major depressive disorder, recurrent episode, moderate      Neck mass      ANGELICA (obstructive sleep apnea)      Osteoarthritis      Restless legs syndrome      Sciatica      Sjogren's syndrome      Temporomandibular joint disorder            Surgical History:  Past Surgical History:   Procedure Laterality Date     APPENDECTOMY       BACK SURGERY  1962     BREAST BIOPSY Left 09/27/2002     CARDIAC SURGERY       CHOLECYSTECTOMY       FLEXIBLE SIGMOIDOSCOPY  11/03/2011     ILEOSTOMY  02/01/2012    takedown loop ileostomy      LEFT COLECTOMY  11/07/2011     TOTAL ABDOMINAL HYSTERECTOMY W/ BILATERAL SALPINGOOPHORECTOMY  11/07/2011     URETERAL STENT PLACEMENT  11/07/2011       Family History:   Family History   Problem Relation Age of Onset     Coronary artery disease Mother      MI-first at age 63     Heart disease Mother      Hypertension Mother      Other Mother      cerebrovascular disease      Hyperlipidemia Mother      Hyperlipidemia Father      Hypertension Father      Dementia Father      Alzheimer's disease Father      Coronary artery disease Sister      minor MI-age 50's     Heart disease Sister      Hypertension Sister      Hypertension Brother      Hypertension Sister      Colon cancer Sister      Prostate cancer Brother      Diverticulitis Sister      Diverticulitis Brother      Parkinsonism Brother      Lung cancer Sister      Breast cancer Daughter      Heart disease Sister      CHF     Diabetes Sister      Asthma Sister        Social History:    Social History     Social History     Marital status: Single     Spouse name: N/A     Number of children: N/A     Years of education: N/A     Social History Main Topics     Smoking status: Former Smoker     Packs/day: 0.50     Years: 10.00     Types: Cigarettes     Quit date: 8/1/2011     Smokeless tobacco: Never Used     Alcohol use No     Drug use: No     Sexual activity: No     Other Topics Concern     None     Social History Narrative           Review of Systems   Constitutional: Negative for activity change, chills and fever.   HENT: Negative for hearing loss.    Eyes: Negative for visual disturbance.   Respiratory: Positive for chest tightness and shortness of breath. Negative for apnea.    Cardiovascular: Negative for chest pain and palpitations.   Gastrointestinal: Negative for abdominal pain, constipation, nausea and vomiting.   Endocrine: Negative for polydipsia, polyphagia and polyuria.   Genitourinary: Negative for decreased urine volume and urgency.   Musculoskeletal: Negative for neck pain and neck stiffness.   Skin: Negative for rash.   Hematological: Does not bruise/bleed easily.   Psychiatric/Behavioral: Negative for agitation and behavioral problems.       Vitals:    04/18/17 0745   BP: 104/71   Pulse: 67   Resp: 18   Temp: 98.8  F (37.1  C)   SpO2: 98%       Physical Exam   Constitutional: She appears well-developed and well-nourished. No distress.   HENT:   Head: Normocephalic and atraumatic.   Nose: Nose normal.   Mouth/Throat: Oropharynx is clear and moist. No oropharyngeal exudate.   Eyes: Conjunctivae and EOM are normal. Right eye exhibits no discharge. Left eye exhibits no discharge. No scleral icterus.   Neck: Neck supple. No tracheal deviation present. No thyromegaly present.   Cardiovascular: Exam reveals no gallop and no friction rub.    No murmur heard.  Irregularly irregular with adequate rate control.   Pulmonary/Chest: Effort normal. No respiratory distress. She has no wheezes. She has no rales.   Abdominal: Soft. Bowel sounds are normal. She exhibits distension. She exhibits no mass. There is no tenderness. There is no rebound and no guarding.   Musculoskeletal: Normal range of motion. She exhibits no edema or tenderness.   Lymphadenopathy:     She has no cervical adenopathy.   Neurological: She is alert. She displays normal reflexes. No cranial nerve deficit. She exhibits normal muscle tone. Coordination normal.    Skin: Skin is warm and dry. No rash noted. She is not diaphoretic. No erythema. No pallor.   Psychiatric: She has a normal mood and affect. Her behavior is normal.       Medication List:  Current Outpatient Prescriptions   Medication Sig     acyclovir (ZOVIRAX) 400 MG tablet Take 400 mg by mouth 5 x daily PRN.     acyclovir (ZOVIRAX) 5 % ointment Apply 1 application topically every 4 (four) hours.     albuterol (PROAIR HFA;PROVENTIL HFA;VENTOLIN HFA) 90 mcg/actuation inhaler Inhale 2 puffs every 6 (six) hours as needed for wheezing.     atorvastatin (LIPITOR) 40 MG tablet Take 40 mg by mouth daily.     azithromycin (ZITHROMAX) 250 MG tablet Take 250 mg by mouth daily.     fluticasone (FLOVENT HFA) 220 mcg/actuation inhaler Inhale 2 puffs 2 (two) times a day.     furosemide (LASIX) 40 MG tablet Take 40 mg by mouth 2 (two) times a day.     insulin aspart (NOVOLOG) 100 unit/mL injection Inject 10 Units under the skin 3 (three) times a day with meals.     insulin glargine (LANTUS) 100 unit/mL injection Inject 23 Units under the skin Daily before breakfast.     ketoconazole (NIZORAL) 2 % cream Apply 1 application topically 2 (two) times a day.     Lactobacillus rhamnosus GG (CULTURELLE) 10-15 Billion cell capsule Take 1 capsule by mouth 3 (three) times a day before meals.     levoFLOXacin (LEVAQUIN) 500 MG tablet Take 500 mg by mouth daily.     lisinopril (PRINIVIL,ZESTRIL) 2.5 MG tablet Take 2.5 mg by mouth daily.     metFORMIN (GLUCOPHAGE-XR) 500 MG 24 hr tablet Take 1,000 mg by mouth daily with supper.     metoprolol succinate (TOPROL-XL) 100 MG 24 hr tablet Take 100 mg by mouth daily.     montelukast (SINGULAIR) 10 mg tablet Take 10 mg by mouth at bedtime.     pantoprazole (PROTONIX) 20 MG tablet Take 20 mg by mouth daily.     PARoxetine (PAXIL) 10 MG tablet Take 10 mg by mouth at bedtime.     polyethylene glycol (MIRALAX) 17 gram packet Take 17 g by mouth daily as needed.     potassium chloride SA (K-DUR,KLOR-CON)  20 MEQ tablet Take 20 mEq by mouth daily.     predniSONE (DELTASONE) 10 MG tablet Take 10 mg by mouth daily.     spironolactone (ALDACTONE) 25 MG tablet Take 25 mg by mouth daily.       Labs: Hospital labs are as follows; alk phosphatase was 72, ALT was 19, AST was 29.  Lipase was 136, lactic acid was 1.6.  Troponins were within normal limits.  Blood culture showed no growth after 2 hours the rest is pending.  Glucose was 228, white count was 6.4, hemoglobin was 9.9, platelet count was within normal limits.  INR was 1.79, sodium was 143, potassium was 3.3, BUN was 7, creatinine was 0.58, calcium was 8.2, albumin was 3.0 with normal bilirubin.  Blood sugars here in the transitional care are running anywhere from 122 up to and including 305.      Assessment:    ICD-10-CM    1. Diabetes mellitus E11.9    2. Pneumonia J18.9    3. Essential hypertension I10    4. Influenza J11.1    5. Altered mental status R41.82    6. Atrial fibrillation I48.91    7. Respiratory failure J96.90        Plan: Plan at this time is to okay to discharge home with current meds and current diabetic medications.  She will follow-up with her primary care physician in 1 week to discuss any changes in her diabetic medications or her steroids.  I would recommend at this time we check an INR today and give a dose for the next few days.  Patient will check her weights on a daily basis at home and report to her primary care physician if any 2-3 pound weight change.  She is also to notify her primary care physician if she has any increase in edema or weight loss.  As well as shortness of breath.  She will have home PT for strength and endurance and home health nurse for diabetic management and INR check.  Will be no other changes to care plan at this time and greater than 1 hour was spent on this admission with greater than 50% of the time dedicated to coordination of care and coordinating her discharge.      Electronically signed by: Wayne Kwan,  DO

## 2021-06-10 NOTE — PROGRESS NOTES
[facility]    Facility:   [facility] [[id number]]   Code Status: DNR      CHIEF COMPLAINT/REASON FOR VISIT:  Chief Complaint   Patient presents with     Problem Visit     asked to see       HISTORY:      HPI: Betty is a 76 y.o. female who I was asked to see secondary to being on the transitional care unit from her hospitalization April 2, 2017 at [facility] secondary to fever atrial fibrillation pneumonia acute respiratory failure and acute respiratory failure with hypoxia.  The acute respiratory failure did resolve.  She was admitted for acute mental status changes and evaluation of persistent cough dizziness and fever and was found positive for influenza.  She did develop acute hypoxic respiratory failure due to pulmonary congestion.  She was initially treated for C AP and antibiotics and steroids were increased given nebulizer treatments fluids and oxygen.  She was also started on Tamiflu.  The blood cultures white cell count and the pro calcitonin did not suggest bacterial infection and did continue with antibiotics.  Initially she did require oxygen for short periods of ambulation eventually discharged without having as much issues or concerns about shortness of breath.  Cardiovascular was consulted secondary to her TTE showed akinesis of the anterior septal wall.  They thought it was secondary to the A. fib.  They also felt the dizziness was multifactorial.  She currently is on Coumadin for the A. fib.  Her hypertension pressures were within acceptable ranges.  She also does have uncontrolled diabetes mellitus with an A1c 14.3.  Also she was on steroids during her hospitalization was discharged with Lantus NovoLog and metformin and ACE inhibitor.  Currently is on the transitional care unit.  Has significantly improved.  No longer requiring oxygen.  We will discontinue that.  She has been normotensive and afebrile.  Blood sugars ranging 151-310.  Currently on prednisone 10  mg.  We will discontinue the azithromycin and the Levaquin.  She also is on Lasix 40 mg twice a day will now decrease at the 20 mg twice a day she is also on potassium 20 mEq and will get a BMP next week and I will and is also on Spironolactone 25 mg.  For heartburn reflux she is on Protonix.  For her lipids she is on a atorvastatin.  Had a chance to talk about her insulin and we will increase the Lantus now to 25 units and also give her some sliding scale insulin because she does need to go home soon and she does not have a clue about diabetes and her nor diabetic education and I asked the staff and specifically wrote orders that she needs to start learning how to do some of the stuff on her own and they need to teach her plus she will also need to follow-up with diabetic educator on the day of discharge to go over all her information as well as make sure she is suited up at home with appropriate intervention narrowing with her but with family so there is an issue they also know how to treat this particular problem.    Past Medical History:   Diagnosis Date     (HFpEF) heart failure with preserved ejection fraction      Acute and chronic respiratory failure with hypoxia      Alcohol abuse, in remission      Atrial fibrillation with controlled ventricular response      Breast fibroadenoma      Colouterine fistula      Diabetes mellitus, type 2      Disorder of bone and cartilage      Diverticulitis of colon      DVT (deep venous thrombosis)      Enlarged lymph node      Fatty liver disease, nonalcoholic      GERD (gastroesophageal reflux disease)      HTN (hypertension)      ILD (interstitial lung disease)      Insomnia      Left ventricular hypertrophy      Lower GI bleed      Major depressive disorder, recurrent episode, moderate      Neck mass      ANGELICA (obstructive sleep apnea)      Osteoarthritis      Restless legs syndrome      Sciatica      Sjogren's syndrome      Temporomandibular joint disorder               Family History   Problem Relation Age of Onset     Coronary artery disease Mother      MI-first at age 63     Heart disease Mother      Hypertension Mother      Other Mother      cerebrovascular disease      Hyperlipidemia Mother      Hyperlipidemia Father      Hypertension Father      Dementia Father      Alzheimer's disease Father      Coronary artery disease Sister      minor MI-age 50's     Heart disease Sister      Hypertension Sister      Hypertension Brother      Hypertension Sister      Colon cancer Sister      Prostate cancer Brother      Diverticulitis Sister      Diverticulitis Brother      Parkinsonism Brother      Lung cancer Sister      Breast cancer Daughter      Heart disease Sister      CHF     Diabetes Sister      Asthma Sister      Social History     Social History     Marital status: Single     Spouse name: N/A     Number of children: N/A     Years of education: N/A     Social History Main Topics     Smoking status: Former Smoker     Packs/day: 0.50     Years: 10.00     Types: Cigarettes     Quit date: 8/1/2011     Smokeless tobacco: Never Used     Alcohol use No     Drug use: No     Sexual activity: No     Other Topics Concern     Not on file     Social History Narrative     No narrative on file         Review of Systems  Currently she denies any chills fever coughing wheezing chest pain dizziness vertigo nausea vomiting diarrhea dysuria unusual aches or pains rashes or sores.  History of osteoarthritis A. fib hypertension insomnia obstructive sleep apnea diabetes depression.    Current Outpatient Prescriptions   Medication Sig     acyclovir (ZOVIRAX) 400 MG tablet Take 400 mg by mouth 5 x daily PRN.     acyclovir (ZOVIRAX) 5 % ointment Apply 1 application topically every 4 (four) hours.     albuterol (PROAIR HFA;PROVENTIL HFA;VENTOLIN HFA) 90 mcg/actuation inhaler Inhale 2 puffs every 6 (six) hours as needed for wheezing.     atorvastatin (LIPITOR) 40 MG tablet Take 40 mg by mouth daily.      azithromycin (ZITHROMAX) 250 MG tablet Take 250 mg by mouth daily.     dextromethorphan-guaiFENesin (ROBITUSSIN-DM)  mg/5 mL liquid Take 10 mL by mouth every 4 (four) hours as needed.     fluticasone (FLOVENT HFA) 220 mcg/actuation inhaler Inhale 2 puffs 2 (two) times a day.     furosemide (LASIX) 40 MG tablet Take 40 mg by mouth 2 (two) times a day.     insulin aspart (NOVOLOG) 100 unit/mL injection Inject 10 Units under the skin 3 (three) times a day with meals.     insulin glargine (LANTUS) 100 unit/mL injection Inject 23 Units under the skin Daily before breakfast.     ketoconazole (NIZORAL) 2 % cream Apply 1 application topically 2 (two) times a day.     Lactobacillus rhamnosus GG (CULTURELLE) 10-15 Billion cell capsule Take 1 capsule by mouth 3 (three) times a day before meals.     levoFLOXacin (LEVAQUIN) 500 MG tablet Take 500 mg by mouth daily.     lisinopril (PRINIVIL,ZESTRIL) 2.5 MG tablet Take 2.5 mg by mouth daily.     metFORMIN (GLUCOPHAGE-XR) 500 MG 24 hr tablet Take 1,000 mg by mouth daily with supper.     metoprolol succinate (TOPROL-XL) 100 MG 24 hr tablet Take 100 mg by mouth daily.     montelukast (SINGULAIR) 10 mg tablet Take 10 mg by mouth at bedtime.     pantoprazole (PROTONIX) 20 MG tablet Take 20 mg by mouth daily.     PARoxetine (PAXIL) 10 MG tablet Take 10 mg by mouth at bedtime.     polyethylene glycol (MIRALAX) 17 gram packet Take 17 g by mouth daily as needed.     potassium chloride SA (K-DUR,KLOR-CON) 20 MEQ tablet Take 20 mEq by mouth daily.     [START ON 4/15/2017] predniSONE (DELTASONE) 10 MG tablet Take 10 mg by mouth daily.     predniSONE (DELTASONE) 10 MG tablet Take 20 mg by mouth daily.     spironolactone (ALDACTONE) 25 MG tablet Take 25 mg by mouth daily.       .  Vitals:    04/14/17 1445   BP: 127/75   Pulse: 91   Resp: 21   Temp: 96.9  F (36.1  C)       Physical Exam   Constitutional: She is oriented to person, place, and time. She appears well-developed and  well-nourished. No distress.   HENT:   Head: Normocephalic.   Eyes: Pupils are equal, round, and reactive to light.   Neck: Neck supple. No thyromegaly present.   Cardiovascular: Normal rate, regular rhythm and normal heart sounds.    Pulmonary/Chest: Breath sounds normal.   Abdominal: Bowel sounds are normal. There is no tenderness. There is no guarding.   Musculoskeletal: She exhibits no edema, tenderness or deformity.   Lymphadenopathy:     She has no cervical adenopathy.   Neurological: She is oriented to person, place, and time.   Skin: Skin is warm and dry. No rash noted.         LABS:   Results for orders placed or performed in visit on 08/19/14   BASIC METABOLIC PANEL   Result Value Ref Range    Sodium 141 136 - 145 mmol/L    Potassium 3.8 3.5 - 5.0 mmol/L    Chloride 104 98 - 107 mmol/L    CO2 26 22 - 31 mmol/L    Anion Gap, Calculation 11 5 - 18 mmol/L    Glucose 83 70 - 125 mg/dL    Calcium 9.3 8.5 - 10.5 mg/dL    BUN 15 8 - 28 mg/dL    Creatinine 0.78 0.60 - 1.10 mg/dL    GFR MDRD Af Amer >60 >60 mL/min/1.73m2    GFR MDRD Non Af Amer >60 >60 mL/min/1.73m2       Lab Results   Component Value Date    WBC 8.3 08/19/2014    HGB 12.2 08/19/2014    HCT 38.5 08/19/2014    MCV 95 08/19/2014     08/19/2014     No results found for: HGBA1C      ASSESSMENT:      ICD-10-CM    1. Diabetes mellitus E11.9    2. Essential hypertension I10    3. GERD (gastroesophageal reflux disease) K21.9    4. Hyperlipidemia E78.5    5. Pneumonia J18.9        PLAN:    Next week we will get a BMP.  Decrease the furosemide 20 mg twice a day rather than 40 mg twice a day.  Discontinue the Zithromax as well as the Levaquin.  Also give her sliding scale insulin and she does need to do all the diabetic checking and insulin and injections with visual help from staff.  She will also follow-up and make appointment with diabetic educator at her current clinic.  A BMP next week.  Increase the Lantus 25 units.  Discontinue the oxygen.  For  documentation purposes total visit was over 55 minutes to which over 50% was spent with the patient going over her hospitalization her medications her laboratory studies and specifically her diabetes as well as care of the diabetes and coordination of care efforts on the unit.      Electronically signed by: Michael Duane Johnson, CNP

## 2021-06-10 NOTE — TELEPHONE ENCOUNTER
Referral printed and faxed to [facility] for pool therapy. Called to retrieve fax #, it is, [phone].     Fax verified by Tal[place].     Margarita Chen [facility] [place]

## 2021-06-11 ENCOUNTER — TELEPHONE (OUTPATIENT)
Dept: FAMILY MEDICINE | Facility: CLINIC | Age: 81
End: 2021-06-11

## 2021-06-11 NOTE — TELEPHONE ENCOUNTER
Reason for Call:  Form, our goal is to have forms completed with 72 hours, however, some forms may require a visit or additional information.    Type of letter, form or note:  medication safety review form    Who is the form from?: [employer] (if other please explain)    Where did the form come from: form was faxed in    What clinic location was the form placed at?: [facility]    Where the form was placed: [facility] Box[facility]Folder    What number is listed as a contact on the form?:  Fax [phone]       Additional comments:     Call taken on 6/11/2021 at 11:56 AM by Flex Lopez

## 2021-06-15 ENCOUNTER — TELEPHONE (OUTPATIENT)
Dept: PALLIATIVE MEDICINE | Facility: CLINIC | Age: 81
End: 2021-06-15

## 2021-06-15 NOTE — TELEPHONE ENCOUNTER
06/23/2021 right piriformis injection with Dr Oscar Marin RN  Care Coordinator  [facility]

## 2021-06-15 NOTE — TELEPHONE ENCOUNTER
Screening Questions for Radiology Injections:    Injection to be done at which interventional clinic site? [facility]    If [facility] location, tell patient that this procedure requires a COVID-19 lab test be done within 4 days of the procedure. Would you still like to move forward with scheduling the procedure?  Not Applicable   If YES, let patient know that someone will call them to schedule the COVID-19 test and that they will only receive a call back if the result is positive. Route to nursing to enter order.     Instruct patient to arrive as directed prior to the scheduled appointment time:    [place] minutes before      [place]: 30 minutes before; if IV needed 1 hour before     Procedure ordered by Oscar    Procedure ordered? right piriformis injection      Transforaminal Cervical ANDRAE - no pain provider currently performing    As a reminder, receiving steroids can decrease your body's ability to fight infection.   Would you still like to move forward with scheduling the injection?  Yes    What insurance would patient like us to bill for this procedure? Medicare/ BCBS (NO PA)      Worker's comp or MVA (motor vehicle accident) -Any injection DO NOT SCHEDULE and route to Shona Vann.      [employer] insurance - For SI joint injections, DO NOT SCHEDULE and route Shona Vann.       ALL BCBS, [employer] and [employer] CIGNA-Route to Shona for review DO NOT SCHEDULE      IF SCHEDULING IN [place] AND NEEDS A PA, IT IS OKAY TO SCHEDULE. [place] HANDLES THEIR OWN PA'S AFTER THE PATIENT IS SCHEDULED. PLEASE SCHEDULE AT LEAST 1 WEEK OUT SO A PA CAN BE OBTAINED.    Any chance of pregnancy? NO   If YES, do NOT schedule and route to  pool    Is an  needed? No     Patient has a drive home? (mandatory) YES: INFORMED    Is patient taking any blood thinners (i.e. plavix, coumadin, jantoven, warfarin, heparin, pradaxa or dabigatran, etc)? Yes - Xarelto (NO HOLD)   If hold needed, do NOT  schedule, route to RN pool     Is patient taking any aspirin products (includes Excedrin and Fiorinal)? No     If more than 325mg/day, OK to schedule; Instruct pt to decrease to less than 325 mg for 7 days AND route to RN pool    For CERVICAL procedures, hold all aspirin products for 6 days.     Tell pt that if aspirin product is not held for 6 days, the procedure WILL BE cancelled.      Does the patient have a bleeding or clotting disorder? No     If YES, okay to schedule AND route to RN nurse pool    For any patients with platelet count <100, must be forwarded to provider    Any allergies to contrast dye, iodine, shellfish, or numbing and steroid medications? No    If YES, add allergy information to appointment notes AND route to the RN pool     If ANDRAE and Contrast Dye / Iodine Allergy? DO NOT SCHEDULE, route to RN pool    Allergies: Amoxicillin-pot clavulanate, Augmentin, Codeine, Codeine, Penicillins, Phenobarbital, Phenobarbital, and Seasonal allergies     Is patient diabetic?  Yes  If YES, instruct them to bring their glucometer.    Does patient have an active infection or treated for one within the past week? No     Is patient currently taking any antibiotics?  Yes - Azithromycin  Chronic     For patients on chronic, preventative, or prophylactic antibiotics, procedures may be scheduled.     For patients on antibiotics for active or recent infection:antibiotic course must have been completed for 4 days    Is patient currently taking any steroid medications? (i.e. Prednisone, Medrol)  Yes - Prednisone Chronic     For patients on steroid medications, course must have been completed for 4 days    Is patient actively being treated for cancer or immunocompromised? No  If YES, do NOT schedule and route to RN pool     Are you able to get on and off an exam table with minimal or no assistance? Yes  If NO, do NOT schedule and route to RN pool    Are you able to roll over and lay on your stomach with minimal or no  assistance? Yes  If NO, do NOT schedule and route to RN pool     Has the patient had a flu shot or any other vaccinations within 7 days before or after the procedure.  No     Have you recently had a COVID vaccine or have plans to get it in the near future? No    If yes, explain that for the vaccine to work best they need to:       wait 1 week before and 1 week after getting Vaccine #1    wait 1 week before and 2 weeks after getting Vaccine #2    If patient has concerns about the timing, send to RN pool     Does patient have an MRI/CT?  Not Applicable  Check Procedure Scheduling Grid to see if required.      Was the MRI done within the last 3 years?  NA    If yes, where was the MRI done i.e.[facility], [facility], [place], [facility] etc?       If no, do not schedule and route to RN pool    If MRI was not done at [place], [facility] or [facility] do NOT schedule and route to RN pool.      If pt has an imaging disc, the injection MAY be scheduled but pt has to bring disc to appt.     If they show up without the disc the injection cannot be done    Procedure Specific Instructions:      If celiac plexus block, informed patient NPO for 6 hours and that it is okay to take medications with sips of water, especially blood pressure medications  Not Applicable         If this is for a cervical procedure, informed patient that aspirin needs to be held for 6 days.   Not Applicable      If IV needed:    Do not schedule procedures requiring IV placement in the first appointment of the day or first appointment after lunch. Do NOT schedule at 0745, 0815 or 1245.     Instructed pt to arrive 30 minutes early for IV start if required. (Check Procedure Scheduling Grid)  Not Applicable    Reminders:      If you are started on any steroids or antibiotics between now and your appointment, you must contact us because the procedure may need to be cancelled.  Yes      For all procedures except radiofrequency ablations (RFAs) and  spinal cord stimulator (SCS) trials, informed patient:    IV sedation is not provided for this procedure.  If you feel that an oral anti-anxiety medication is needed, you can discuss this further with your referring provider or primary care provider.  The Pain Clinic provider will discuss specifics of what the procedure includes at your appointment.  Most procedures last 10-20 minutes.  We use numbing medications to help with any discomfort during the procedure.  Not Applicable      For patients 85 or older we recommend having an adult stay w/ them for the remainder of the day.       Does the patient have any questions?  NO  Mya Martin  [facility]

## 2021-06-17 DIAGNOSIS — I50.43 ACUTE ON CHRONIC COMBINED SYSTOLIC AND DIASTOLIC HEART FAILURE (H): Primary | ICD-10-CM

## 2021-06-18 DIAGNOSIS — M16.11 OSTEOARTHRITIS OF RIGHT HIP, UNSPECIFIED OSTEOARTHRITIS TYPE: ICD-10-CM

## 2021-06-18 DIAGNOSIS — G89.29 OTHER CHRONIC PAIN: ICD-10-CM

## 2021-06-18 RX ORDER — HYDROCODONE BITARTRATE AND ACETAMINOPHEN 7.5; 325 MG/1; MG/1
1 TABLET ORAL EVERY 12 HOURS
Qty: 60 TABLET | Refills: 0 | Status: SHIPPED | OUTPATIENT
Start: 2021-06-18 | End: 2021-08-12

## 2021-06-18 NOTE — TELEPHONE ENCOUNTER
Last Office Visit:  1/15/2020  Next Enc:  10/1/21  Medication: Norco   Last given: 4/22/21  Qty: 60

## 2021-06-20 RX ORDER — TORSEMIDE 10 MG/1
TABLET ORAL
Qty: 90 TABLET | Refills: 0 | Status: SHIPPED | OUTPATIENT
Start: 2021-06-20 | End: 2021-10-05

## 2021-06-22 ENCOUNTER — TELEPHONE (OUTPATIENT)
Dept: PALLIATIVE MEDICINE | Facility: CLINIC | Age: 81
End: 2021-06-22

## 2021-06-22 DIAGNOSIS — Z79.4 TYPE 2 DIABETES MELLITUS WITH HYPERGLYCEMIA, WITH LONG-TERM CURRENT USE OF INSULIN (H): ICD-10-CM

## 2021-06-22 DIAGNOSIS — E11.65 TYPE 2 DIABETES MELLITUS WITH HYPERGLYCEMIA, WITH LONG-TERM CURRENT USE OF INSULIN (H): ICD-10-CM

## 2021-06-22 NOTE — TELEPHONE ENCOUNTER
Spoke to patient, reminded patient of date, time and location of appointment.      Reminded patient to eat and drink as usual.    Remained to hold any blood thinners or pain medications that they were asked to hold per nurse.     Reminded patient they will need a  for this appointment.      Reminded patient that both patient and  must wear a mask during their visit.        Zayra Christy MA  [facility] [place]

## 2021-06-23 ENCOUNTER — RADIOLOGY INJECTION OFFICE VISIT (OUTPATIENT)
Dept: PALLIATIVE MEDICINE | Facility: CLINIC | Age: 81
End: 2021-06-23
Payer: MEDICARE

## 2021-06-23 VITALS — HEART RATE: 65 BPM | DIASTOLIC BLOOD PRESSURE: 76 MMHG | OXYGEN SATURATION: 98 % | SYSTOLIC BLOOD PRESSURE: 150 MMHG

## 2021-06-23 DIAGNOSIS — G57.01 PIRIFORMIS SYNDROME, RIGHT: Primary | ICD-10-CM

## 2021-06-23 DIAGNOSIS — M79.18 MYOFASCIAL PAIN: ICD-10-CM

## 2021-06-23 PROCEDURE — 20552 NJX 1/MLT TRIGGER POINT 1/2: CPT | Performed by: PHYSICAL MEDICINE & REHABILITATION

## 2021-06-23 PROCEDURE — 77002 NEEDLE LOCALIZATION BY XRAY: CPT | Mod: 26 | Performed by: PHYSICAL MEDICINE & REHABILITATION

## 2021-06-23 RX ORDER — SEMAGLUTIDE 1.34 MG/ML
1 INJECTION, SOLUTION SUBCUTANEOUS
Qty: 3 ML | Refills: 0 | Status: SHIPPED | OUTPATIENT
Start: 2021-06-23 | End: 2021-08-04

## 2021-06-23 NOTE — TELEPHONE ENCOUNTER
CW,    Ozempic:    Routing refill request to provider for review/approval because:  Labs out of range:    Creatinine   Date Value Ref Range Status   06/04/2021 1.11 (H) 0.52 - 1.04 mg/dL Final     Last OV 6/4/21    Thanks,  Sophia Hemphill, RN

## 2021-06-23 NOTE — NURSING NOTE
Discharge Information    IV Discontiued Time:  NA    Amount of Fluid Infused:  NA    Discharge Criteria = When patient returns to baseline or as per MD order    Consciousness:  Pt is fully awake    Circulation:  BP +/- 20% of pre-procedure level    Respiration:  Patient is able to breathe deeply    O2 Sat:  Patient is able to maintain O2 Sat >92% on room air    Activity:  Moves 4 extremities on command    Ambulation:  Patient is able to stand and walk or stand and pivot into wheelchair    Dressing:  Clean/dry or No Dressing    Notes:   Discharge instructions and AVS given to patient    Patient meets criteria for discharge?  YES    Admitted to PCU?  No    Responsible adult present to accompany patient home?  Yes    Signature/Title:    Susan Marin RN Care Coordinator  [facility] [place]

## 2021-06-23 NOTE — TELEPHONE ENCOUNTER
Sent in rx, but still concerned that her f/u visit is scheduled for 10/21.  Had rec f/u in 2-3 months at her last appt- rec later August for a 45 min appt.  Will send to TC pool to call pt to see if it can be rescheduled.  Thanks!  CW

## 2021-06-23 NOTE — NURSING NOTE
Pre-procedure Intake    Have you been fasting? Yes    If yes, for how long?     Are you taking a prescribed blood thinner such as coumadin, Plavix, Xarelto?    Yes -   Other blood thinners    If yes, when did you take your last dose? Xarelto 06/22/21    Do you take aspirin?  No    If cervical procedure, have you held aspirin for 6 days?   NA    Do you have any allergies to contrast dye, iodine, steroid and/or numbing medications?  NO    Are you currently taking antibiotics or have an active infection?  YES: azithromycin 250 MG    Have you had a fever/elevated temperature within the past week? NO    Are you currently taking oral steroids? YES: prednisone    Do you have a ? Yes       Are you pregnant or breastfeeding?  Not Applicable    Have you received the COVID-19 vaccine? Yes       If yes, was it your 1st, 2nd or only dose needed? 2nd    Date of most recent vaccine: 02/2021    Vitals: B/P 154/80    Notify provider and RNs if systolic BP >170, diastolic BP >100, P >100 or O2 sats < 90%      Zayra Christy MA  [facility] [place]

## 2021-06-23 NOTE — PROGRESS NOTES
[facility] - Procedure Note    Date of Service: 6/23/21    Procedure performed: Right piriformis injection  Diagnosis: Myofascial trigger point/piriformis syndrome  : Charly Moore DO & Gigi Post MD (Fellow)     Indications: Betty Tee is a 81 year old female who is seen for a right piriformis injection. The patient describes pain in her right low back that radiates down the right leg to the calf. She has tenderness in the region of the right piriformis muscle. The patient reports minimal improvement with conservative treatment, including oral medications and exercises.    They had a prior piriformis injection in April with 2 months of moderate pain relief.      Allergies:      Allergies   Allergen Reactions     Amoxicillin-Pot Clavulanate      Augmentin Nausea and Vomiting     Codeine Nausea and Vomiting     Codeine      PN: LW Reaction: HIVES     Penicillins Nausea and Vomiting     PN: LW Reaction: GI Upset     Phenobarbital Itching     Phenobarbital      Seasonal Allergies         Vitals:  BP (!) 154/80   Pulse 68   SpO2 100%     Review of Systems: The patient denies recent fever, chills, illness, use of antibiotics or anticoagulants. All other 10-point review of systems negative.     Procedure:   A fluoroscopic view including the SI joint and the superiolateral border of the right acetabulum was obtained.  A location 1/3 of the distance medial from the acetabulum to the SI joint, overlying the ileum, was marked.  2 ml of Lidocaine 1% was used to anesthetize the skin. The needle was advance under intermittent fluroscopy using the two-pop method through the gluteal muscle to the deeper piriformis muscle. There were no paresthesias or contractions noted in the calf or foot.  At this point, 1 mL of Omnipaque was injected and 9mL wasted, with a flow consistent with piriformis muscle spread.  A solution containing 2 ml of 0.5% bupivacaine and 40 mg of kenalog was  injected.  The needle was removed.     Assessment/Plan: Betty Tee is a 81 year old female s/p Right piriformis injection today for myositis/piriformis syndrome.     Pre procecedure pain score: 6/10   Post procedure pain score: 6/10.     1. The patient was advised to contact the [facility] for any of the following:   Fever, chills, or night sweats   New onset of pain, numbness, or weakness   Any questions/concerns regarding the procedure  If unable to contact the Pain Center, the patient was instructed to go to a local Emergency Room for any complications.   2. The patient will schedule a follow up in 4 weeks.      Charly Moore DO  [facility] [place]

## 2021-06-23 NOTE — PATIENT INSTRUCTIONS
[facility] Procedure Discharge Instructions    Today you saw:      Dr. Charly Moore    Your procedure:    Piriformis injection     Medications used:  Lidocaine (anesthetic)  Kenalog (steroid)  Omnipaque (contrast)                Be cautious when walking as numbness and/or weakness in the legs may occur up to 6-8 hours after the procedure due to effect of the local anesthetic    Do not drive for 6 hours. The effect of the local anesthetic could slow your reflexes.     Avoid strenuous activity for the first 24 hours. You may resume your regular activities after that.     You may shower, however avoid swimming, tub baths or hot tubs for 24 hours following your procedure    You may have a mild to moderate increase in pain for several days following the injection.      You may use ice packs for 10-15 minutes, 3 to 4 times a day at the injection site for comfort    Do not use heat to painful areas for 6 to 8 hours. This will give the local anesthetic time to wear off and prevent you from accidentally burning your skin.    Unless you have been directed to avoid the use of anti-inflammatory medications (NSAIDS-ibuprofen, Aleve, Motrin), you may use these medications or Tylenol for pain control if needed.     With diabetes, check your blood sugar more frequently than usual as your blood sugar may be higher than normal for 10-14 days following a steroid injection. Contact your doctor who manages your diabetes if your blood sugar is higher than usual    Possible side effects of steroids that you may experience include flushing, elevated blood pressure, increased appetite, mild headaches and restlessness.  All of these symptoms will get better with time.    It may take up to 14 days for the steroid medication to start working although you may feel the effect as early as a few days after the procedure.     Follow up with your referring provider in 2-3 weeks      If you experience any of the following,  call the pain center line during work hours at [phone] or on-call physician after hours at [phone]:  -Fever over 100 degree F  -Swelling, bleeding, redness, drainage, warmth at the injection site  -Progressive weakness or numbness in your legs   -Loss of bowel or bladder function  -Unusual headache that is not relieved by Tylenol or your regular headache medication  -Unusual new onset of pain that is not improving

## 2021-07-05 DIAGNOSIS — Z79.4 TYPE 2 DIABETES MELLITUS WITH HYPERGLYCEMIA, WITH LONG-TERM CURRENT USE OF INSULIN (H): ICD-10-CM

## 2021-07-05 DIAGNOSIS — M35.02 SJOGREN'S SYNDROME WITH LUNG INVOLVEMENT (H): Primary | ICD-10-CM

## 2021-07-05 DIAGNOSIS — E11.65 TYPE 2 DIABETES MELLITUS WITH HYPERGLYCEMIA, WITH LONG-TERM CURRENT USE OF INSULIN (H): ICD-10-CM

## 2021-07-06 DIAGNOSIS — I50.30 (HFPEF) HEART FAILURE WITH PRESERVED EJECTION FRACTION (H): ICD-10-CM

## 2021-07-06 RX ORDER — INSULIN GLARGINE 100 [IU]/ML
INJECTION, SOLUTION SUBCUTANEOUS
Qty: 15 ML | Refills: 1 | Status: SHIPPED | OUTPATIENT
Start: 2021-07-06 | End: 2021-10-11

## 2021-07-06 NOTE — TELEPHONE ENCOUNTER
Patient informed  Will callback to schedule   Currently in the car  Also needs Basaglar  Prescription approved per [facility] Refill Protocol.  Lydia HALEY RN

## 2021-07-08 NOTE — TELEPHONE ENCOUNTER
TORSEMIDE  20MG     Last Written Prescription Date:  4/16/20  Last Fill Quantity: 270:,   # refills: 4  Last Office Visit : 8/27/20  Future Office visit:  NONE  RTC  1 YEAR  Routing refill request to provider for review/approval because:  ABNORM  CR    * BP    Creatinine   Date Value Ref Range Status   06/04/2021 1.11 (H) 0.52 - 1.04 mg/dL Final

## 2021-07-09 RX ORDER — TORSEMIDE 20 MG/1
TABLET ORAL
Qty: 270 TABLET | Refills: 0 | Status: SHIPPED | OUTPATIENT
Start: 2021-07-09 | End: 2021-10-13

## 2021-07-13 ENCOUNTER — PRE VISIT (OUTPATIENT)
Dept: UROLOGY | Facility: CLINIC | Age: 81
End: 2021-07-13

## 2021-07-13 DIAGNOSIS — I50.43 ACUTE ON CHRONIC COMBINED SYSTOLIC AND DIASTOLIC HEART FAILURE (H): ICD-10-CM

## 2021-07-13 NOTE — TELEPHONE ENCOUNTER
Reason for Visit: Follow-up last seen (12/04/2019)    Diagnosis: frequency, nocturnal, incontinence    Orders/Procedures/Records: in system    Contact Patient: n/a    Rooming Requirements: UA dip / PVR      Jason Garcia  07/13/21  9:16 AM

## 2021-07-14 ENCOUNTER — MEDICAL CORRESPONDENCE (OUTPATIENT)
Dept: HEALTH INFORMATION MANAGEMENT | Facility: CLINIC | Age: 81
End: 2021-07-14

## 2021-07-14 ENCOUNTER — OFFICE VISIT (OUTPATIENT)
Dept: UROLOGY | Facility: CLINIC | Age: 81
End: 2021-07-14
Attending: UROLOGY
Payer: MEDICARE

## 2021-07-14 ENCOUNTER — TELEPHONE (OUTPATIENT)
Dept: FAMILY MEDICINE | Facility: CLINIC | Age: 81
End: 2021-07-14

## 2021-07-14 VITALS
HEIGHT: 67 IN | DIASTOLIC BLOOD PRESSURE: 84 MMHG | HEART RATE: 56 BPM | BODY MASS INDEX: 31.39 KG/M2 | WEIGHT: 200 LBS | SYSTOLIC BLOOD PRESSURE: 134 MMHG

## 2021-07-14 DIAGNOSIS — N39.41 URGE INCONTINENCE OF URINE: Primary | ICD-10-CM

## 2021-07-14 DIAGNOSIS — N95.2 ATROPHIC VAGINITIS: ICD-10-CM

## 2021-07-14 DIAGNOSIS — R39.15 URINARY URGENCY: ICD-10-CM

## 2021-07-14 DIAGNOSIS — N39.41 URGE INCONTINENCE: ICD-10-CM

## 2021-07-14 PROCEDURE — 99214 OFFICE O/P EST MOD 30 MIN: CPT | Performed by: UROLOGY

## 2021-07-14 RX ORDER — LIDOCAINE HYDROCHLORIDE 20 MG/ML
JELLY TOPICAL ONCE
Status: DISCONTINUED | OUTPATIENT
Start: 2021-07-14 | End: 2021-07-14

## 2021-07-14 ASSESSMENT — MIFFLIN-ST. JEOR: SCORE: 1404.82

## 2021-07-14 ASSESSMENT — PAIN SCALES - GENERAL: PAINLEVEL: NO PAIN (0)

## 2021-07-14 NOTE — PROGRESS NOTES
"Urology Clinic Note      Date: 7/14/2021  Time: 10:58 AM  Patient: Betty Tee  MRN: [id number]    Reason for Visit:  Follow up urinary urgency/incontinence    HPI/Subjective: Betty Tee is a 81 year old female who presents to the clinic today for follow up evaluation regarding urinary urgency and incontinence. She was last seen in clinic for this concern in December of 2019 at which time she was doing well with conservative management. Today, Sister Betty reports she continues to do well. She does report occasional episodes of daytime urinary urgency and incontinence. If she forgets to take her lasix until late afternoon she will be up all night going to the bathroom, but she has tried to switch her regimen to take this in the AM and it has made a big difference.     Objective:  /84   Pulse 56   Ht 1.702 m (5' 7\")   Wt 90.7 kg (200 lb)   BMI 31.32 kg/m    Gen: In NAD, conversant.  Resp: Breathing non-labored on room air.  CV: Warm and well perfused, RRR on peripheral pulse.  Abd: Soft, non-distended, non-tender.  Ext: Moving all 4, no BLE edema noted  Neuro: No focal deficits noted     Labs/Imaging:   Urine dipstick:   GLU Neg.  ROSALINDA Neg.  Ket Neg.  SG 1.015  BLO Small  pH 6.5  PRO Neg.  Uro 0.2  Nit Neg.  Radha Trace    Assessment & Plan: Betty Tee is a 81 year old female with a history of urinary urgency and urge incontinence as well. We discussed the nature of her condition as well as various options for treatment. We discussed that her symptoms could be managed with behavior modification, vaginal estrogen, and/or medication use. She expressed that she is not interested in management with medication at this time.  Additionally, we discussed that vaginal estrogen could significantly improve her symptoms as well, especially in the setting of her Sjogren syndrome.   -Use of a voiding diary to identify potential triggers for her symptoms was discussed in detail  -new Rx for estrogen " cream  -follow up in 6 months, sooner should symptoms worsen or fail to improve significantly.       Mikey Garcia, MS4     Patient was seen, evaluated and plan was formulated in conjunction with me and I agree with the above.  Vale Nassar MD    32 minutes spent on the date of the encounter doing chart review, history and exam, documentation and further activities per the note

## 2021-07-14 NOTE — PATIENT INSTRUCTIONS
-Use of a voiding diary to identify potential triggers for her symptoms was discussed in detail  -new Rx for estrogen cream  -follow up in 6 months, sooner should symptoms worsen or fail to improve significantly.

## 2021-07-14 NOTE — TELEPHONE ENCOUNTER
Reason for Call:  Form, our goal is to have forms completed with 72 hours, however, some forms may require a visit or additional information.    Type of letter, form or note:  medical  Letter of medical necessity-Cpap equipment    Who is the form from?: [facility] (if other please explain)    Where did the form come from: form was faxed in    What clinic location was the form placed at?: [facility]    Where the form was placed: Dr Tristan's Box/Folder    What number is listed as a contact on the form?: [phone]       Additional comments:     Call taken on 7/14/2021 at 2:54 PM by Sabiha Finn

## 2021-07-14 NOTE — LETTER
"7/14/2021       RE: Betty Tee  [address]  [place] [address]     Dear Colleague,    Thank you for referring your patient, Betty Tee, to the [facility] [place] at [facility]. Please see a copy of my visit note below.    Urology Clinic Note      Date: 7/14/2021  Time: 10:58 AM  Patient: Betty Tee  MRN: [id number]    Reason for Visit:  Follow up urinary urgency/incontinence    HPI/Subjective: Betty Tee is a 81 year old female who presents to the clinic today for follow up evaluation regarding urinary urgency and incontinence. She was last seen in clinic for this concern in December of 2019 at which time she was doing well with conservative management. Today, Sister Betty reports she continues to do well. She does report occasional episodes of daytime urinary urgency and incontinence. If she forgets to take her lasix until late afternoon she will be up all night going to the bathroom, but she has tried to switch her regimen to take this in the AM and it has made a big difference.     Objective:  /84   Pulse 56   Ht 1.702 m (5' 7\")   Wt 90.7 kg (200 lb)   BMI 31.32 kg/m    Gen: In NAD, conversant.  Resp: Breathing non-labored on room air.  CV: Warm and well perfused, RRR on peripheral pulse.  Abd: Soft, non-distended, non-tender.  Ext: Moving all 4, no BLE edema noted  Neuro: No focal deficits noted     Labs/Imaging:   Urine dipstick:   GLU Neg.  ROSALINDA Neg.  Ket Neg.  SG 1.015  BLO Small  pH 6.5  PRO Neg.  Uro 0.2  Nit Neg.  Radha Trace    Assessment & Plan: Betty Tee is a 81 year old female with a history of urinary urgency and urge incontinence as well. We discussed the nature of her condition as well as various options for treatment. We discussed that her symptoms could be managed with behavior modification, vaginal estrogen, and/or medication use. She expressed that she is not " interested in management with medication at this time.  Additionally, we discussed that vaginal estrogen could significantly improve her symptoms as well, especially in the setting of her Sjogren syndrome.   -Use of a voiding diary to identify potential triggers for her symptoms was discussed in detail  -new Rx for estrogen cream  -follow up in 6 months, sooner should symptoms worsen or fail to improve significantly.       Mikey Garcia, MS4     Patient was seen, evaluated and plan was formulated in conjunction with me and I agree with the above.  Vale Nassar MD    32 minutes spent on the date of the encounter doing chart review, history and exam, documentation and further activities per the note

## 2021-07-14 NOTE — TELEPHONE ENCOUNTER
Faxed back to [facility] to forward to Sleep MD [facility] Unit Coordinator

## 2021-07-15 RX ORDER — TORSEMIDE 10 MG/1
TABLET ORAL
Qty: 90 TABLET | Refills: 0 | OUTPATIENT
Start: 2021-07-15

## 2021-07-19 LAB
ALBUMIN UR-MCNC: NEGATIVE MG/DL
APPEARANCE UR: CLEAR
BILIRUB UR QL STRIP: NEGATIVE
COLOR UR AUTO: YELLOW
GLUCOSE UR STRIP-MCNC: NEGATIVE MG/DL
HGB UR QL STRIP: ABNORMAL
KETONES UR STRIP-MCNC: NEGATIVE MG/DL
LEUKOCYTE ESTERASE UR QL STRIP: ABNORMAL
NITRATE UR QL: NEGATIVE
PH UR STRIP: 6.5 [PH] (ref 5–8)
SP GR UR STRIP: 1.01 (ref 1–1.03)
UROBILINOGEN UR STRIP-ACNC: 0.2 E.U./DL

## 2021-07-20 NOTE — RESULT ENCOUNTER NOTE
Here are your lab results from your recent visit...  -Your basic metabolic panel (which includes electrolyte levels, blood sugar level and kidney function tests) looks a bit better this time, with the GFR (flow rate through your kidneys) increasing from 33 to 47.  I'd still like to keep a close eye on it, but I don't think we need to send you on to the kidney specialists at this point. We should recheck it at your 3/3/21 appointment.    Please let me know if you have any questions.  Best,   Lev Tristan MD     oral

## 2021-07-23 ENCOUNTER — VIRTUAL VISIT (OUTPATIENT)
Dept: PALLIATIVE MEDICINE | Facility: CLINIC | Age: 81
End: 2021-07-23
Payer: MEDICARE

## 2021-07-23 DIAGNOSIS — M16.11 OSTEOARTHRITIS OF RIGHT HIP, UNSPECIFIED OSTEOARTHRITIS TYPE: ICD-10-CM

## 2021-07-23 DIAGNOSIS — G89.29 OTHER CHRONIC PAIN: ICD-10-CM

## 2021-07-23 DIAGNOSIS — G57.01 PIRIFORMIS SYNDROME, RIGHT: Primary | ICD-10-CM

## 2021-07-23 PROCEDURE — 99213 OFFICE O/P EST LOW 20 MIN: CPT | Mod: 95 | Performed by: PHYSICAL MEDICINE & REHABILITATION

## 2021-07-23 ASSESSMENT — PAIN SCALES - GENERAL: PAINLEVEL: MILD PAIN (3)

## 2021-07-23 NOTE — PATIENT INSTRUCTIONS
1. Call the number below for medication refills.    2. Call the number below to set up a follow up in about 2 months or sooner as needed.    Take care,    Charly Moore DO  [facility]        ----------------------------------------------------------------  Clinic Number:  [phone]     Call with any questions about your care and for scheduling assistance.     Calls are returned Monday through Friday between 8 AM and 4:30 PM. We usually get back to you within 2 business days depending on the issue/request.    If we are prescribing your medications:    For opioid medication refills, call the clinic or send a NUVETA message 7 days in advance.  Please include:    Name of requested medication    Name of the pharmacy.    For non-opioid medications, call your pharmacy directly to request a refill. Please allow 3-4 days to be processed.     Per [place] State Law:    All controlled substance prescriptions must be filled within 30 days of being written.      For those controlled substances allowing refills, pickup must occur within 30 days of last fill.      We believe regular attendance is key to your success in our program!      Any time you are unable to keep your appointment we ask that you call us at least 24 hours in advance to cancel.This will allow us to offer the appointment time to another patient.     Multiple missed appointments may lead to dismissal from the clinic.

## 2021-07-23 NOTE — PROGRESS NOTES
Sister Betty is a 81 year old who is being evaluated via a billable video visit.      How would you like to obtain your AVS? MyChart and mail copy  If the video visit is dropped, the invitation should be resent by: Text to cell phone: [phone]  Will anyone else be joining your video visit? Yes: Sister Nghia (Friend). How would they like to receive their invitation? Text to cell phone: [phone]   Is Pt currently in [place]? Yes      Zayra Christy MA  [facility]                                         [facility]    Date of visit: 7/23/21       Assessment:  Sister Betty Buckner is a 81 year old medically complex patient with past medical history including: Atrial fibrillation, VLH, History of DVT, CHF, HLD, HTN, DM 2, Stage 3 CKD, RLS, Depression, History of etoh abuse (remission 30+ years) who presents for evaluation and treatment of the following chronic pain conditions:     1. Right sided hip and leg pain: Patient describes a long standing history of right hip and leg pain with exacerbation in the past year. She reports pain that starts in her right low back/upper buttock and radiates laterally and posteriorly down the right leg to the foot.  Based on this it appears her symptoms are due to a combination of lumbar spondylosis, right gluteal dysfunction and piriformis syndrome.    MRI was independently reviewed and concerning for right S1 nerve root abutment /impingement, no improvement noted with a S1 liliya. Due to ongoing buttock, hip and right leg radiating pain we tried a piriformis injection which resulted in about 60% improvement in her symptoms which lasted about 8 weeks before gradually returning. Less benefit noted with repeat piriformis injection.            Plan:  The following recommendations were given to the patient. Diagnosis, treatment options, risks, benefits, and alternatives were discussed, and all questions were answered. The patient  expressed understanding of the plan for management.      I am recommending a multidisciplinary treatment plan to help this patient better manage her pain.  This includes:      1. Physical Therapy: Continue PT. Patient to start pool therapy at [facility].  2. Clinical Health Pain Psychologist: Coping well, baseline memory issues, likely to be of limited benefit.  3. Diagnostic Studies: None at this time.  4. Medication Management:   1. Continue Norco 7.5-325mg BID prn.   2. Continue methocarbamol 750mg TID prn as needed for cramping buttock/hip pain.   3. Continue tylenol 1000mg TID prn.  5. Further procedures recommended: right piriformis injection can be repeated every 3+ months as needed.  6. Follow up: 2 month virtual visit.      DO [facility]           Chief complaint:   Chief Complaint   Patient presents with     Pain       Interval history:  Betty Tee is a 80 year old female last seen by me on 6/23/21.        Since her last visit, Betty Tee reports:  -She had a right piriformis steroid injection on 6/23/21 with mild improvement in her pain which has already worn off.    -They continue to have right sided buttock pain with intermittent pain radiating into the right leg.    -They had their initial pool therapy evaluation and will be starting sessions at [facility] in [place].    -[facility] has been helpful for pain flares, they have plenty of this medication remaining.      Pain scores:   Pain intensity on average is 8 on a scale of 0-10.        Pain Treatments:  1. Medications:       Current pain medications:  -Tylenol 1000mg q8h prn  -Escitalopram 20mg daily   -Norco 7.5-325mg prn   -methocarbamol 750mg tid prn       Previous pain medications:  -Tramadol 50mg prn  -Norco 7.5-325mg prn  -Tizanidine, flexeril - nh   -Gabapentin 300mg TID - memory difficulty  2. Physical Therapy: hasn't completed PT for low back or hip pain                TENS unit: hasn't  tried  3. Pain psychology: hasn't tried  4. Surgery: Lumbar spine surgery in the 1960s.  5. Injections:   -Right piriformis injection on 4/1/21 with 60% relief for 8 weeks. Repeated in June with approx 3-4 weeks of relief.  -Two epidural injections (right L5 tfesi), feb 2020 was helpful the next day but she had a fall right after, aug 2020 procedure - not helpful, had worsening pain  6. Alternative Therapies:               Chiropractic: hasn't tried               Acupuncture: hasn't tried          Side Effects: no side effect    Medications:  Current Outpatient Medications   Medication Sig Dispense Refill     acetaminophen (TYLENOL) 500 MG tablet Take 1,000 mg by mouth every 8 hours as needed (max 6 tablets/24 hours, 2 tablets/dose)        acyclovir (ZOVIRAX) 400 MG tablet Take 1 tablet (400 mg) by mouth 3 times daily For a couple days 15 tablet 2     acyclovir (ZOVIRAX) 5 % external ointment Apply topically 6 times daily As needed for outbreaks 15 g 3     albuterol (PROAIR HFA, PROVENTIL HFA, VENTOLIN HFA) 108 (90 BASE) MCG/ACT inhaler Inhale 2 puffs into the lungs every 6 hours 1 Inhaler 3     alendronate (FOSAMAX) 70 MG tablet Take 1 tablet (70 mg) by mouth every 7 days 12 tablet 2     anakinra (KINERET) 100 MG/0.67ML SOSY injection 100 mg every day x 3 days as needed for flare ups 6.7 mL 3     atorvastatin (LIPITOR) 10 MG tablet TAKE 1/2 TABLET BY MOUTH EVERY DAY 45 tablet 3     azithromycin (ZITHROMAX) 250 MG tablet Take 1 tablet (250 mg) by mouth daily 30 tablet 11     BD JANE U/F 32G X 4 MM insulin pen needle USE 4 DAILY AS DIRECTED. 400 each 0     blood glucose (NO BRAND SPECIFIED) lancets standard Use to test blood sugar 3 times daily or as directed 100 lancet 3     cephALEXin (KEFLEX) 250 MG capsule Take 1 capsule (250 mg) by mouth every 6 hours 20 capsule 0     cevimeline (EVOXAC) 30 MG capsule Take 1 capsule (30 mg) by mouth 3 times daily 270 capsule 2     escitalopram (LEXAPRO) 20 MG tablet TAKE 1  TABLET BY MOUTH EVERY DAY 90 tablet 0     escitalopram (LEXAPRO) 20 MG tablet Take 1 tablet (20 mg) by mouth daily 90 tablet 0     famotidine (PEPCID) 10 MG tablet Take 10 mg by mouth 2 times daily       fluticasone (FLONASE) 50 MCG/ACT nasal spray Spray 1-2 sprays into both nostrils daily as needed for allergies 18.2 mL 11     fluticasone-vilanterol (BREO ELLIPTA) 100-25 MCG/INH inhaler Inhale 1 puff into the lungs daily 1 Inhaler 11     HYDROcodone-acetaminophen (NORCO) 7.5-325 MG per tablet Take 1 tablet by mouth every 12 hours 60 tablet 0     hydroxychloroquine (PLAQUENIL) 200 MG tablet Take 2 tablets (400 mg) by mouth daily Annual Plaquenil toxicity eye screening required. 180 tablet 1     insulin aspart (NOVOLOG FLEXPEN) 100 UNIT/ML pen INJECT 12 UNITS SUBCUTANEOUS 3 TIMES DAILY (WITH MEALS) 15 mL 2     insulin glargine (BASAGLAR KWIKPEN) 100 UNIT/ML pen INJECT 22 UNITS SUBCUTANEOUS DAILY (TO REPLACE LANTUS) 15 mL 1     lidocaine (LIDODERM) 5 % patch APPLY PATCH TO PAINFUL AREA FOR UP TO 12 H WITHIN A 24 H PERIOD. REMOVE AFTER 12 HOURS. 30 patch 2     loratadine (CLARITIN) 10 MG tablet TAKE 1 TABLET BY MOUTH EVERY DAY 90 tablet 1     methocarbamol (ROBAXIN) 750 MG tablet Take 1 tablet (750 mg) by mouth 2 times daily as needed for muscle spasms 60 tablet 0     metoprolol succinate ER (TOPROL-XL) 25 MG 24 hr tablet TAKE 1/2 TABLET BY MOUTH 2 TIMES DAILY WITH 50 MG FOR A TOTAL OF 62.5 TWICE A DAY 90 tablet 1     metoprolol succinate ER (TOPROL-XL) 50 MG 24 hr tablet Take 1 tablet (50 mg) by mouth daily (in combination with 12.5mg for a total of 62.5mg twice a day)       montelukast (SINGULAIR) 10 MG tablet TAKE 1 TABLET BY MOUTH EVERYDAY AT BEDTIME 90 tablet 0     OZEMPIC, 1 MG/DOSE, 2 MG/1.5ML pen INJECT 1 MG SUBCUTANEOUS EVERY 7 DAYS 3 mL 0     potassium chloride ER (KLOR-CON) 20 MEQ CR tablet Take 2 tablets (40 mEq) by mouth every morning AND 1 tablet (20 mEq) every evening. 270 tablet 3     predniSONE  (DELTASONE) 5 MG tablet Take 1 tablet (5 mg) by mouth daily 30 tablet 0     rivaroxaban ANTICOAGULANT (XARELTO ANTICOAGULANT) 20 MG TABS tablet Take 1 tablet (20 mg) by mouth daily (with dinner) 90 tablet 2     spironolactone (ALDACTONE) 25 MG tablet Take 2 tablets (50 mg) by mouth daily 180 tablet 3     torsemide (DEMADEX) 10 MG tablet Take one tab (10 mg) with one 20 mg tab to = 30 mg daily in afternoon. 90 tablet 0     torsemide (DEMADEX) 20 MG tablet Take 2 tablets (40 mg) by mouth every morning AND 1 tablet (20 mg) daily at 2 pm. Take the afternoon dose with an extra 10 mg tab for a total of 30 mg in the afternoon 270 tablet 0     traZODone (DESYREL) 50 MG tablet TAKE 0.5-1.5 TABLETS (25-75 MG) BY MOUTH NIGHTLY AS NEEDED FOR SLEEP 135 tablet 0     vitamin D3 (CHOLECALCIFEROL) 50 mcg (2000 units) tablet Take 1 tablet (50 mcg) by mouth daily 90 tablet 2       Medical History: any changes in medical history since they were last seen? No    Review of Systems:  The 14 system ROS was reviewed and is positive for:  diabetes, gout, back pain, arthritis, paresthesias  Any bowel or bladder problems: frequency, urgency  Mood: aleah Moore DO  [facility]      Video Start Time: 1030am  Video-Visit Details    Type of service:  Video Visit    Video End Time:1042am    Originating Location (pt. Location): Home    Distant Location (provider location):  [facility] [place]     Platform used for Video Visit: Prashanth

## 2021-07-25 DIAGNOSIS — E11.69 TYPE 2 DIABETES MELLITUS WITH OTHER SPECIFIED COMPLICATION (H): ICD-10-CM

## 2021-07-26 RX ORDER — METHOCARBAMOL 750 MG/1
750 TABLET, FILM COATED ORAL 3 TIMES DAILY PRN
Qty: 90 TABLET | Refills: 3 | Status: SHIPPED | OUTPATIENT
Start: 2021-07-26 | End: 2022-05-16

## 2021-07-27 RX ORDER — INSULIN ASPART 100 [IU]/ML
INJECTION, SOLUTION INTRAVENOUS; SUBCUTANEOUS
Qty: 15 ML | Refills: 0 | Status: SHIPPED | OUTPATIENT
Start: 2021-07-27 | End: 2021-09-20

## 2021-07-27 NOTE — TELEPHONE ENCOUNTER
Novolog:    Prescription approved per [facility] Refill Protocol.  Due for follow up around 8/13.    Sophia Hemphill RN

## 2021-07-29 ENCOUNTER — TELEPHONE (OUTPATIENT)
Dept: PALLIATIVE MEDICINE | Facility: CLINIC | Age: 81
End: 2021-07-29

## 2021-07-29 NOTE — TELEPHONE ENCOUNTER
Central Prior Authorization Team   Phone: [phone]    PA Initiation    Medication: methocarbamol (ROBAXIN) 750 MG tablet  Insurance Company: [employer] - Phone [phone] Fax [phone]  Pharmacy Filling the Rx: [id number] - [place], [place] - [address]  Filling Pharmacy Phone: [phone]  Filling Pharmacy Fax:    Start Date: 7/29/2021    Manually faxed request.

## 2021-07-30 NOTE — TELEPHONE ENCOUNTER
Prior Authorization Approval    Authorization Effective Date: 4/30/2021  Authorization Expiration Date: 7/29/2022  Medication: methocarbamol (ROBAXIN) 750 MG tablet  Approved Dose/Quantity:    Reference #:     Insurance Company: [employer] - Phone [phone] Fax [phone]  Expected CoPay:       CoPay Card Available:      Foundation Assistance Needed:    Which Pharmacy is filling the prescription (Not needed for infusion/clinic administered): [id number] - [place], [place] - [address]  Pharmacy Notified:  YES  Patient Notified:

## 2021-08-04 DIAGNOSIS — N95.2 ATROPHIC VAGINITIS: Primary | ICD-10-CM

## 2021-08-12 DIAGNOSIS — M16.11 OSTEOARTHRITIS OF RIGHT HIP, UNSPECIFIED OSTEOARTHRITIS TYPE: ICD-10-CM

## 2021-08-12 DIAGNOSIS — G89.29 OTHER CHRONIC PAIN: ICD-10-CM

## 2021-08-12 RX ORDER — HYDROCODONE BITARTRATE AND ACETAMINOPHEN 7.5; 325 MG/1; MG/1
1 TABLET ORAL EVERY 12 HOURS
Qty: 60 TABLET | Refills: 0 | Status: SHIPPED | OUTPATIENT
Start: 2021-08-12 | End: 2021-10-04

## 2021-08-12 NOTE — TELEPHONE ENCOUNTER
Reason for call:  Medication   If this is a refill request, has the caller requested the refill from the pharmacy already? No  Will the patient be using a [facility]? No  Name of the pharmacy and phone number for the current request: [id number] - [place] - [address]    Name of the medication requested: HYDROcodone-acetaminophen (NORCO) 7.5-325 MG per tablet    Other request:     Phone number to reach patient:  Cell number on file:    Telephone Information:   Mobile [phone]       Best Time:      Can we leave a detailed message on this number?  YES    Travel screening: Not Applicable

## 2021-08-12 NOTE — TELEPHONE ENCOUNTER
Routing to provider to review medication prepped per below      Norco 7.5-325, #60, Refill:no  Sig:Ok to dispense and start 08/12/21  Last picked up 06/18/21   Due: Anytime    Per last OV note 07/23/21:      1. Medication Management:   1. Continue Norco 7.5-325mg BID prn.       Claudia LANDON, RN Care Coordinator  [facility]  Pain Management

## 2021-08-12 NOTE — TELEPHONE ENCOUNTER
Received call from patient  requesting refill(s) for HYDROcodone-acetaminophen (NORCO) 7.5-325 MG per tablet     Last dispensed from pharmacy on 6/18/21    Patient's last office/virtual visit by prescribing provider on 7/23/21  Next office/virtual appointment scheduled for 9/2/21    Last urine drug screen date none  Current opioid agreement on file? NO Date of opioid agreement: none    E-prescribe to:    [id number] - [place], [place] - [address]    Will route to [facility] for review and preparation of prescription(s).

## 2021-08-13 NOTE — TELEPHONE ENCOUNTER
Application [employer] message sent with Rx approval from provider.  Belén  Pain Clinic Management Team

## 2021-08-17 ENCOUNTER — TELEPHONE (OUTPATIENT)
Dept: UROLOGY | Facility: CLINIC | Age: 81
End: 2021-08-17

## 2021-08-17 DIAGNOSIS — N95.2 ATROPHIC VAGINITIS: Primary | ICD-10-CM

## 2021-08-17 NOTE — TELEPHONE ENCOUNTER
[facility] Call Center    Phone Message    May a detailed message be left on voicemail: yes     Reason for Call: Other: The pharmacy has still not received the RX Estriol 1 mg/g .  Can you please send this again to the  Compounding pharmacy and call Nghia once complete at [phone].  She helps the patient with all her medical needs.      Action Taken: Message routed to:  [facility] ([facility] Uro    Travel Screening: Not Applicable

## 2021-08-20 ENCOUNTER — TELEPHONE (OUTPATIENT)
Dept: PALLIATIVE MEDICINE | Facility: CLINIC | Age: 81
End: 2021-08-20

## 2021-08-20 NOTE — TELEPHONE ENCOUNTER
Fax received from [facility].      Message states they have received our referral. They are sending the Plan of Care Certification to provider to complete and sign.     Routing to provider.   Form placed in providers inbox.     Will fax back to [phone]    Margarita Chen [facility] [place]

## 2021-08-23 ENCOUNTER — TRANSFERRED RECORDS (OUTPATIENT)
Dept: HEALTH INFORMATION MANAGEMENT | Facility: CLINIC | Age: 81
End: 2021-08-23

## 2021-08-23 NOTE — TELEPHONE ENCOUNTER
Signed form faxed to Amanda De Leon.    Twyla Lomeli [facility] [place] [place]   [facility] [place]

## 2021-08-23 NOTE — TELEPHONE ENCOUNTER
Signed and placed in same day processing bin.    Charly Moore DO  [facility]

## 2021-08-26 DIAGNOSIS — E55.9 VITAMIN D DEFICIENCY: ICD-10-CM

## 2021-08-27 DIAGNOSIS — E11.9 TYPE 2 DIABETES MELLITUS WITHOUT COMPLICATION, WITHOUT LONG-TERM CURRENT USE OF INSULIN (H): ICD-10-CM

## 2021-08-29 DIAGNOSIS — I48.21 PERMANENT ATRIAL FIBRILLATION (H): ICD-10-CM

## 2021-08-29 DIAGNOSIS — I50.30 (HFPEF) HEART FAILURE WITH PRESERVED EJECTION FRACTION (H): ICD-10-CM

## 2021-08-30 NOTE — TELEPHONE ENCOUNTER
Routing refill request to provider for review/approval because:  Drug not active on patient's medication list  Please authorize if appropriate.  Thanks,  Radha Otero RN

## 2021-08-31 RX ORDER — ACETAMINOPHEN 160 MG
TABLET,DISINTEGRATING ORAL
Qty: 90 CAPSULE | Refills: 2 | Status: SHIPPED | OUTPATIENT
Start: 2021-08-31 | End: 2022-05-24

## 2021-08-31 RX ORDER — BLOOD SUGAR DIAGNOSTIC
STRIP MISCELLANEOUS
Qty: 300 STRIP | Refills: 5 | Status: SHIPPED | OUTPATIENT
Start: 2021-08-31 | End: 2022-10-31

## 2021-08-31 NOTE — TELEPHONE ENCOUNTER
vitamin D3 (CHOLECALCIFEROL) 50 mcg (2000 units) tablet   Last Written Prescription Date:  12/4/20  Last Fill Quantity: 90,   # refills: 2  Last Office Visit :5/25/21  Future Office visit: 10/1/21

## 2021-09-01 RX ORDER — RIVAROXABAN 20 MG/1
TABLET, FILM COATED ORAL
Qty: 90 TABLET | Refills: 0 | Status: SHIPPED | OUTPATIENT
Start: 2021-09-01 | End: 2021-09-16

## 2021-09-01 NOTE — TELEPHONE ENCOUNTER
Last Clinic Visit: 8/27/20   NV: 9/9/21   RTC 1 YEAR  CARD FYI : 90 DAY RF SENT  * OVER DUE CR CLEAR.

## 2021-09-02 ENCOUNTER — LAB (OUTPATIENT)
Dept: LAB | Facility: CLINIC | Age: 81
End: 2021-09-02

## 2021-09-02 ENCOUNTER — OFFICE VISIT (OUTPATIENT)
Dept: PALLIATIVE MEDICINE | Facility: CLINIC | Age: 81
End: 2021-09-02
Payer: MEDICARE

## 2021-09-02 VITALS — DIASTOLIC BLOOD PRESSURE: 78 MMHG | OXYGEN SATURATION: 98 % | SYSTOLIC BLOOD PRESSURE: 144 MMHG | HEART RATE: 61 BPM

## 2021-09-02 DIAGNOSIS — G57.01 PIRIFORMIS SYNDROME, RIGHT: ICD-10-CM

## 2021-09-02 DIAGNOSIS — G89.29 OTHER CHRONIC PAIN: Primary | ICD-10-CM

## 2021-09-02 DIAGNOSIS — G89.29 OTHER CHRONIC PAIN: ICD-10-CM

## 2021-09-02 LAB — CREAT UR-MCNC: 25 MG/DL

## 2021-09-02 PROCEDURE — 99214 OFFICE O/P EST MOD 30 MIN: CPT | Performed by: PHYSICAL MEDICINE & REHABILITATION

## 2021-09-02 PROCEDURE — 82570 ASSAY OF URINE CREATININE: CPT

## 2021-09-02 PROCEDURE — 80307 DRUG TEST PRSMV CHEM ANLYZR: CPT

## 2021-09-02 ASSESSMENT — PAIN SCALES - GENERAL: PAINLEVEL: MILD PAIN (2)

## 2021-09-02 NOTE — PROGRESS NOTES
[facility]    Date of visit: 9/2/21       Assessment:  Sister Betty Buckner is a 81 year old medically complex patient with past medical history including: Atrial fibrillation, VLH, History of DVT, CHF, HLD, HTN, DM 2, Stage 3 CKD, RLS, Depression, History of etoh abuse (remission 30+ years) who presents for evaluation and treatment of the following chronic pain conditions:     1. Right sided hip and leg pain: Patient describes a long standing history of right hip and leg pain with exacerbation in the past year. She reports pain that starts in her right low back/upper buttock and radiates laterally and posteriorly down the right leg to the foot.  Based on this it appears her symptoms are due to a combination of lumbar spondylosis, right gluteal dysfunction and piriformis syndrome.    MRI was independently reviewed and concerning for right S1 nerve root abutment /impingement, no improvement noted with a S1 liliya. Due to ongoing buttock, hip and right leg radiating pain we tried a piriformis injection which resulted in about 60% improvement in her symptoms which lasted about 8 weeks before gradually returning. Repeat injection continues to provide approx 50% pain relief.            Plan:  The following recommendations were given to the patient. Diagnosis, treatment options, risks, benefits, and alternatives were discussed, and all questions were answered. The patient expressed understanding of the plan for management.      I am recommending a multidisciplinary treatment plan to help this patient better manage her pain.  This includes:      1. Physical Therapy: Continue PT, pool therapy at [facility].  2. Clinical Health Pain Psychologist: Coping well defer.  3. Diagnostic Studies: None at this time.  4. Medication Management:   1. Continue Norco 7.5-325mg BID prn.   2. Continue methocarbamol 750mg TID prn as needed for cramping buttock/hip pain.    3. Continue tylenol 1000mg TID prn.  4. CSA/UDS - 9/2/21  5. Further procedures recommended: right piriformis injection can be repeated every 3+ months as needed.  6. Follow up: as needed.      DO [facility]           Chief complaint:   No chief complaint on file.      Interval history:  Betty Tee is a 80 year old female last seen by me on 7/23/21.        Since her last visit, Betty Tee reports:  - Some days she needs norco, but this is not daily  - She focuses on treating the pain with tylenol  - Her pain is still in the R buttock traveling down the side of the R leg - Has evolved from an intense pain to more of an aching, which is an improvement.  - Her VAS scores have improved from 8 to 4/10.  - R piriformis injection patient says she's felt had some sustained effect.  - Went to [facility] once, and could not get in again until this month - she has 9 visits. It was purely water based. It helps due to her fear of falling.  - Does not have an HEP yet.    Has significant pain in the L knee - is using voltaren for it with up to 50% relief.      Pain scores:   Pain intensity on average is 4 on a scale of 0-10.        Pain Treatments:  1. Medications:       Current pain medications:  -Tylenol 1000mg q8h prn - Usually takes 1-2 times a day  -Escitalopram 20mg daily   -Norco 7.5-325mg prn - takes it but not regularly   -methocarbamol 750mg tid prn   -Voltaren 1% gel qid prn       Previous pain medications:  -Tramadol 50mg prn  -Tizanidine, flexeril - nh   -Gabapentin 300mg TID - memory difficulty  2. Physical Therapy: hasn't completed PT for low back or hip pain                TENS unit: hasn't tried  3. Pain psychology: hasn't tried  4. Surgery: Lumbar spine surgery in the 1960s.  5. Injections:   -Right piriformis injection on 4/1/21 with 60% relief for 8 weeks. Repeated in June with approx 50% relief to date.  -Two epidural injections (right L5 tfesi), feb 2020  was helpful the next day but she had a fall right after, aug 2020 procedure - not helpful, had worsening pain  6. Alternative Therapies:               Chiropractic: hasn't tried               Acupuncture: hasn't tried          Side Effects: no side effect    Medications:  Current Outpatient Medications   Medication Sig Dispense Refill     ACCU-CHEK SHANNON PLUS test strip USE TO TEST BLOOD SUGARS 3 TIMES DAILY 300 strip 5     acetaminophen (TYLENOL) 500 MG tablet Take 1,000 mg by mouth every 8 hours as needed (max 6 tablets/24 hours, 2 tablets/dose)        acyclovir (ZOVIRAX) 400 MG tablet Take 1 tablet (400 mg) by mouth 3 times daily For a couple days 15 tablet 2     acyclovir (ZOVIRAX) 5 % external ointment Apply topically 6 times daily As needed for outbreaks 15 g 3     albuterol (PROAIR HFA, PROVENTIL HFA, VENTOLIN HFA) 108 (90 BASE) MCG/ACT inhaler Inhale 2 puffs into the lungs every 6 hours 1 Inhaler 3     alendronate (FOSAMAX) 70 MG tablet Take 1 tablet (70 mg) by mouth every 7 days 12 tablet 2     anakinra (KINERET) 100 MG/0.67ML SOSY injection 100 mg every day x 3 days as needed for flare ups 6.7 mL 3     atorvastatin (LIPITOR) 10 MG tablet TAKE 1/2 TABLET BY MOUTH EVERY DAY 45 tablet 3     azithromycin (ZITHROMAX) 250 MG tablet Take 1 tablet (250 mg) by mouth daily 30 tablet 11     BD JANE U/F 32G X 4 MM insulin pen needle USE 4 DAILY AS DIRECTED. 400 each 0     blood glucose (NO BRAND SPECIFIED) lancets standard Use to test blood sugar 3 times daily or as directed 100 lancet 3     cephALEXin (KEFLEX) 250 MG capsule Take 1 capsule (250 mg) by mouth every 6 hours 20 capsule 0     cevimeline (EVOXAC) 30 MG capsule Take 1 capsule (30 mg) by mouth 3 times daily 270 capsule 2     Cholecalciferol (VITAMIN D3) 50 MCG (2000 UT) CAPS Take 1 tablet (50 mcg) by mouth daily - Oral 90 capsule 2     COMPOUNDED NON-CONTROLLED SUBSTANCE (CMPD RX) - PHARMACY TO MIX COMPOUNDED MEDICATION Estriol 1 mg/g Apply small amount to  finger and apply to inside vagina daily for 2 weeks then twice weekly Route: vaginally Dispense 30 grams 11 refills 30 g 11     escitalopram (LEXAPRO) 20 MG tablet TAKE 1 TABLET BY MOUTH EVERY DAY 90 tablet 0     famotidine (PEPCID) 10 MG tablet Take 10 mg by mouth 2 times daily       fluticasone (FLONASE) 50 MCG/ACT nasal spray Spray 1-2 sprays into both nostrils daily as needed for allergies 18.2 mL 11     fluticasone-vilanterol (BREO ELLIPTA) 100-25 MCG/INH inhaler Inhale 1 puff into the lungs daily 1 Inhaler 11     HYDROcodone-acetaminophen (NORCO) 7.5-325 MG per tablet Take 1 tablet by mouth every 12 hours Ok to dispense and start 08/12/21 60 tablet 0     hydroxychloroquine (PLAQUENIL) 200 MG tablet Take 2 tablets (400 mg) by mouth daily Annual Plaquenil toxicity eye screening required. 180 tablet 1     insulin glargine (BASAGLAR KWIKPEN) 100 UNIT/ML pen INJECT 22 UNITS SUBCUTANEOUS DAILY (TO REPLACE LANTUS) 15 mL 1     lidocaine (LIDODERM) 5 % patch APPLY PATCH TO PAINFUL AREA FOR UP TO 12 H WITHIN A 24 H PERIOD. REMOVE AFTER 12 HOURS. 30 patch 2     loratadine (CLARITIN) 10 MG tablet TAKE 1 TABLET BY MOUTH EVERY DAY 90 tablet 1     methocarbamol (ROBAXIN) 750 MG tablet Take 1 tablet (750 mg) by mouth 3 times daily as needed for muscle spasms 90 tablet 3     metoprolol succinate ER (TOPROL-XL) 25 MG 24 hr tablet TAKE 1/2 TABLET BY MOUTH 2 TIMES DAILY WITH 50 MG FOR A TOTAL OF 62.5 TWICE A DAY 90 tablet 1     metoprolol succinate ER (TOPROL-XL) 50 MG 24 hr tablet Take 1 tablet (50 mg) by mouth daily (in combination with 12.5mg for a total of 62.5mg twice a day)       montelukast (SINGULAIR) 10 MG tablet TAKE 1 TABLET BY MOUTH EVERYDAY AT BEDTIME 90 tablet 0     NEW MED Estriol 1 mg/g  Apply small amount to finger and apply to inside vagina daily for 2 weeks then twice weekly  Route: vaginally  Dispense 30 grams   4 refills 30 g 4     NOVOLOG FLEXPEN 100 UNIT/ML soln INJECT 12 UNITS SUBCUTANEOUS 3 TIMES DAILY  (WITH MEALS) 15 mL 0     potassium chloride ER (KLOR-CON) 20 MEQ CR tablet Take 2 tablets (40 mEq) by mouth every morning AND 1 tablet (20 mEq) every evening. 270 tablet 3     predniSONE (DELTASONE) 5 MG tablet Take 1 tablet (5 mg) by mouth daily 30 tablet 0     Semaglutide, 1 MG/DOSE, (OZEMPIC, 1 MG/DOSE,) 4 MG/3ML SOPN INJECT 1 MG SUBCUTANEOUS EVERY 7 DAYS 3 mL 0     spironolactone (ALDACTONE) 25 MG tablet Take 2 tablets (50 mg) by mouth daily 180 tablet 3     torsemide (DEMADEX) 10 MG tablet Take one tab (10 mg) with one 20 mg tab to = 30 mg daily in afternoon. 90 tablet 0     torsemide (DEMADEX) 20 MG tablet Take 2 tablets (40 mg) by mouth every morning AND 1 tablet (20 mg) daily at 2 pm. Take the afternoon dose with an extra 10 mg tab for a total of 30 mg in the afternoon 270 tablet 0     traZODone (DESYREL) 50 MG tablet TAKE 0.5-1.5 TABLETS (25-75 MG) BY MOUTH NIGHTLY AS NEEDED FOR SLEEP 135 tablet 0     XARELTO ANTICOAGULANT 20 MG TABS tablet TAKE 1 TABLET BY MOUTH DAILY WITH DINNER 90 tablet 0       Medical History: any changes in medical history since they were last seen? No    Review of Systems:  The 14 system ROS was reviewed and is positive for:  diabetes, gout, back pain, arthritis, paresthesias  Any bowel or bladder problems: frequency, urgency  Mood: denies    BP (!) 144/78   Pulse 61   SpO2 98%   Gen: NAD pleasant  Neuro/MSK: Uses fww for ambulation. Strength is 5/5 and symmetric in the lower extremities.      I saw and examined the patient with the Pain Fellow/Resident. I have reviewed and agree with the resident's note and plan of care and made changes and corrections directly to the body of the note.    TIME SPENT:  BY FELLOW/RESIDENT ALONE 20 MIN  BY MYSELF WITHOUT THE FELLOW/RESIDENT 15 MIN    BILLING TIME DOCUMENTATION:   The total TIME spent on this patient on the date of the encounter/appointment was 40 minutes.      TOTAL TIME includes:   Time spent preparing to see the patient (reviewing  records and tests)   Time spent face to face (or over the phone) with the patient   Time spent ordering tests, medications, procedures and referrals   Time spent Referring and communicating with other healthcare professionals   Time spent documenting clinical information in Epic           DO [facility]

## 2021-09-02 NOTE — NURSING NOTE
Obtained urine specimen.  Specimen sent to the lab.        Zayra Christy MA  [facility]

## 2021-09-02 NOTE — PATIENT INSTRUCTIONS
1. Call the number below for your next refills or to request repeat injections.    2. Let me know if you need any modification to your PT orders.    3. Call the number below to follow-up as needed.    Take care,    Charly Moore DO  [facility]        ----------------------------------------------------------------  Clinic Number:  [phone]     Call with any questions about your care and for scheduling assistance.     Calls are returned Monday through Friday between 8 AM and 4:30 PM. We usually get back to you within 2 business days depending on the issue/request.    If we are prescribing your medications:    For opioid medication refills, call the clinic or send a M[employer] message 7 days in advance.  Please include:    Name of requested medication    Name of the pharmacy.    For non-opioid medications, call your pharmacy directly to request a refill. Please allow 3-4 days to be processed.     Per [place] State Law:    All controlled substance prescriptions must be filled within 30 days of being written.      For those controlled substances allowing refills, pickup must occur within 30 days of last fill.      We believe regular attendance is key to your success in our program!      Any time you are unable to keep your appointment we ask that you call us at least 24 hours in advance to cancel.This will allow us to offer the appointment time to another patient.     Multiple missed appointments may lead to dismissal from the clinic.

## 2021-09-04 LAB — CREATININE URINE MG/DL  (SYNCED VALUE): 25 MG/DL

## 2021-09-05 DIAGNOSIS — I48.19 PERSISTENT ATRIAL FIBRILLATION (H): ICD-10-CM

## 2021-09-05 DIAGNOSIS — I50.30 (HFPEF) HEART FAILURE WITH PRESERVED EJECTION FRACTION (H): ICD-10-CM

## 2021-09-07 DIAGNOSIS — I50.30 (HFPEF) HEART FAILURE WITH PRESERVED EJECTION FRACTION (H): ICD-10-CM

## 2021-09-09 ENCOUNTER — LAB (OUTPATIENT)
Dept: LAB | Facility: CLINIC | Age: 81
End: 2021-09-09
Payer: MEDICARE

## 2021-09-09 ENCOUNTER — ANCILLARY PROCEDURE (OUTPATIENT)
Dept: CARDIOLOGY | Facility: CLINIC | Age: 81
End: 2021-09-09
Payer: MEDICARE

## 2021-09-09 ENCOUNTER — OFFICE VISIT (OUTPATIENT)
Dept: CARDIOLOGY | Facility: CLINIC | Age: 81
End: 2021-09-09
Attending: INTERNAL MEDICINE
Payer: MEDICARE

## 2021-09-09 VITALS
DIASTOLIC BLOOD PRESSURE: 65 MMHG | SYSTOLIC BLOOD PRESSURE: 121 MMHG | HEART RATE: 56 BPM | OXYGEN SATURATION: 96 % | BODY MASS INDEX: 30.35 KG/M2 | WEIGHT: 193.8 LBS

## 2021-09-09 DIAGNOSIS — I50.32 CHRONIC HEART FAILURE WITH PRESERVED EJECTION FRACTION (H): ICD-10-CM

## 2021-09-09 DIAGNOSIS — I50.32 CHRONIC HEART FAILURE WITH PRESERVED EJECTION FRACTION (H): Primary | ICD-10-CM

## 2021-09-09 LAB
ANION GAP SERPL CALCULATED.3IONS-SCNC: 10 MMOL/L (ref 3–14)
BUN SERPL-MCNC: 17 MG/DL (ref 7–30)
CALCIUM SERPL-MCNC: 9 MG/DL (ref 8.5–10.1)
CHLORIDE BLD-SCNC: 105 MMOL/L (ref 94–109)
CO2 SERPL-SCNC: 25 MMOL/L (ref 20–32)
CREAT SERPL-MCNC: 1.07 MG/DL (ref 0.52–1.04)
GFR SERPL CREATININE-BSD FRML MDRD: 49 ML/MIN/1.73M2
GLUCOSE BLD-MCNC: 189 MG/DL (ref 70–99)
LVEF ECHO: NORMAL
NT-PROBNP SERPL-MCNC: 184 PG/ML (ref 0–450)
POTASSIUM BLD-SCNC: 4 MMOL/L (ref 3.4–5.3)
SODIUM SERPL-SCNC: 140 MMOL/L (ref 133–144)

## 2021-09-09 PROCEDURE — 36415 COLL VENOUS BLD VENIPUNCTURE: CPT | Performed by: PATHOLOGY

## 2021-09-09 PROCEDURE — 99215 OFFICE O/P EST HI 40 MIN: CPT | Performed by: INTERNAL MEDICINE

## 2021-09-09 PROCEDURE — G0463 HOSPITAL OUTPT CLINIC VISIT: HCPCS

## 2021-09-09 PROCEDURE — 80048 BASIC METABOLIC PNL TOTAL CA: CPT | Performed by: INTERNAL MEDICINE

## 2021-09-09 PROCEDURE — 83880 ASSAY OF NATRIURETIC PEPTIDE: CPT | Performed by: INTERNAL MEDICINE

## 2021-09-09 PROCEDURE — 93306 TTE W/DOPPLER COMPLETE: CPT | Performed by: STUDENT IN AN ORGANIZED HEALTH CARE EDUCATION/TRAINING PROGRAM

## 2021-09-09 RX ORDER — POTASSIUM CHLORIDE 1500 MG/1
TABLET, EXTENDED RELEASE ORAL
Qty: 270 TABLET | Refills: 3 | Status: SHIPPED | OUTPATIENT
Start: 2021-09-09 | End: 2022-09-15

## 2021-09-09 ASSESSMENT — PAIN SCALES - GENERAL: PAINLEVEL: NO PAIN (0)

## 2021-09-09 NOTE — PATIENT INSTRUCTIONS
"Cardiology Providers you saw during your visit:  Dr. Rosa    Medication changes:  1.  No medication changes    Follow up:  1.  Follow up with [facility] in 6 months with labs- March 11 at 1 pm with Ruthy Russell NP, labs at 12:30  2.  Follow up with Dr Rosa in a year with labs and echo  Labs:  Results for SISTER JEAN KENNEDY (MRN [id number]) as of 9/9/2021 15:26   Ref. Range 9/9/2021 13:16   Sodium Latest Ref Range: 133 - 144 mmol/L 140   Potassium Latest Ref Range: 3.4 - 5.3 mmol/L 4.0   Chloride Latest Ref Range: 94 - 109 mmol/L 105   Carbon Dioxide Latest Ref Range: 20 - 32 mmol/L 25   Urea Nitrogen Latest Ref Range: 7 - 30 mg/dL 17   Creatinine Latest Ref Range: 0.52 - 1.04 mg/dL 1.07 (H)   GFR Estimate Latest Ref Range: >60 mL/min/1.73m2 49 (L)   Calcium Latest Ref Range: 8.5 - 10.1 mg/dL 9.0   Anion Gap Latest Ref Range: 3 - 14 mmol/L 10   N-Terminal Pro Bnp Latest Ref Range: 0 - 450 pg/mL 184   Glucose Latest Ref Range: 70 - 99 mg/dL 189 (H)       Please call if you have :  1. Weight gain of more than 2 pounds in a day or 5 pounds in a week  2. Increased shortness of breath, swelling or bloating  3. Dizziness, lightheadedness   4. Any questions or concerns.       Follow the American Heart Association Diet and Lifestyle recommendations:  Limit saturated fat, trans fat, sodium, red meat, sweets and sugar-sweetened beverages. If you choose to eat red meat, compare labels and select the leanest cuts available.  Aim for at least 150 minutes of moderate physical activity or 75 minutes of vigorous physical activity - or an equal combination of both - each week.      During business hours: [phone], press option # 1 to be routed to the [employer] then option # 4 for \"To send a message to your care team\"      After hours, weekends or holidays: On Call Cardiologist- [phone]   option #4 and ask to speak to the on-call Cardiologist. Inform them you are a CORE/heart failure patient at the " "[facility].      Heart Failure Support Group  [facility]  The Board Room, 8th Floor  [address] [address]     Meetings   1-2 pm  , 1-2 p.m.  , 1-2 p.m.  , 1-2 p.m.  , 1-2 p.m.  May 3rd, 1-2 p.m.  , 1-2 p.m.  , 1-2 p.m.  , 1-2 p.m.  , 1-2 p.m.  , 1-2 p.m.  , 1-2 p.m.  , 1-2 p.m.      Flor Velasquez RN BSN CHFN  Cardiology Care Coordinator - Heart Failure/ [facility]  [facility]   Questions and [phone]   First press #1 for the [facility] and then press #4 for \"To send a message to your care team\"    "

## 2021-09-09 NOTE — LETTER
9/9/2021      RE: Betty Tee  [address]  [facility] [address]       Dear Colleague,    Thank you for the opportunity to participate in the care of your patient, Betty Tee, at the [facility] [place] at [facility]. Please see a copy of my visit note below.    September 9, 2021    Follow up HFpEF    HPI:   80 yo F with interstitial lung disease (moderate restriction), Sjogren's syndrome, HTN, atrial fibrillation, diverticulitis s/p colectomy 2011 who I am following for HFpEF. She is here for routine HFpEF follow up.     She has only occasional lightheadedness, no syncope, no chest pain, no PND, no orthopnea, or lower extremity edema.      She does wear her CPAP mask routinely.     Exercise tolerance is mostly limited by hip and back pain. She does have fatigue and KIMBLE with 1-2 blocks of walking.     PAST MEDICAL HISTORY:  Past Medical History:   Diagnosis Date     Alcohol abuse, in remission      Allergic rhinitis, cause unspecified     allegra helps when she takes it     Antiplatelet or antithrombotic long-term use      Atrial fibrillation (H)     in hosp in 11/11 after surgery w/ fluid overload     Cardiomegaly     LVH on stress echo- cardiac w/u at [facility] ER- neg CT scan for PE, neg stress echo in 8/06     Chest pain, unspecified      Disorder of bone and cartilage, unspecified     osteopenia (had been on prempro), improved on 6/06 dexa, stable dexa 11/10     Diverticulosis of colon (without mention of hemorrhage)     last episode yrs ago     Essential hypertension, benign      Follicular bronchiolitis (H)     associated with Sjogrens, dx by chest CT showing mosaic attenuation and air trapping     Gastro-oesophageal reflux disease      ILD (interstitial lung disease) (H)     associated with Sjogrens, also has mildly elevated IgG4, first noted on chest CT 2015 (mild changes) and also has small airways disease; ILD improved on  follow up chest CT 2018.     Insomnia, unspecified     weaned off clonazepam     Irregular heart beat      Lumbago 7/09    MRI with DJD, now seeing Dr. Cain for sciatic sx's     Major depressive disorder, recurrent episode, moderate (H)      Obstructive sleep apnea     uses cpap     Osteoarthrosis, unspecified whether generalized or localized, unspecified site      Sjogren's syndrome (H)     + RG and SSA and lip bx     Sleep apnea      Tobacco use disorder     chantix in 9/07, started again in 6/08, working     FAMILY HISTORY:  Mother had MI and CHF in her 60's. 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    CURRENT MEDICATIONS:  Current Outpatient Medications   Medication Sig Dispense Refill     ACCU-CHEK SHANNON PLUS test strip USE TO TEST BLOOD SUGARS 3 TIMES DAILY 300 strip 5     acetaminophen (TYLENOL) 500 MG tablet Take 1,000 mg by mouth every 8 hours as needed (max 6 tablets/24 hours, 2 tablets/dose)        acyclovir (ZOVIRAX) 400 MG tablet Take 1 tablet (400 mg) by mouth 3 times daily For a couple days 15 tablet 2     acyclovir (ZOVIRAX) 5 % external ointment Apply topically 6 times daily As needed for outbreaks 15 g 3     albuterol (PROAIR HFA, PROVENTIL HFA, VENTOLIN HFA) 108 (90 BASE) MCG/ACT inhaler Inhale 2 puffs into the lungs every 6 hours 1 Inhaler 3     alendronate (FOSAMAX) 70 MG tablet Take 1 tablet (70 mg) by mouth every 7 days 12 tablet 2     anakinra (KINERET) 100 MG/0.67ML SOSY injection 100 mg every day x 3 days as needed for flare ups 6.7 mL 3     atorvastatin (LIPITOR) 10 MG tablet TAKE 1/2 TABLET BY MOUTH EVERY DAY 45 tablet 3     azithromycin (ZITHROMAX) 250 MG tablet Take 1 tablet (250 mg) by mouth daily 30 tablet 11     BD JANE U/F 32G X 4 MM insulin pen needle USE 4 DAILY AS DIRECTED. 400 each 0     blood glucose (NO BRAND SPECIFIED) lancets standard Use to test blood sugar 3 times daily or as directed  100 lancet 3     cephALEXin (KEFLEX) 250 MG capsule Take 1 capsule (250 mg) by mouth every 6 hours 20 capsule 0     cevimeline (EVOXAC) 30 MG capsule Take 1 capsule (30 mg) by mouth 3 times daily 270 capsule 2     Cholecalciferol (VITAMIN D3) 50 MCG (2000 UT) CAPS Take 1 tablet (50 mcg) by mouth daily - Oral 90 capsule 2     COMPOUNDED NON-CONTROLLED SUBSTANCE (CMPD RX) - PHARMACY TO MIX COMPOUNDED MEDICATION Estriol 1 mg/g Apply small amount to finger and apply to inside vagina daily for 2 weeks then twice weekly Route: vaginally Dispense 30 grams 11 refills 30 g 11     escitalopram (LEXAPRO) 20 MG tablet TAKE 1 TABLET BY MOUTH EVERY DAY 90 tablet 0     famotidine (PEPCID) 10 MG tablet Take 10 mg by mouth 2 times daily       fluticasone (FLONASE) 50 MCG/ACT nasal spray Spray 1-2 sprays into both nostrils daily as needed for allergies 18.2 mL 11     fluticasone-vilanterol (BREO ELLIPTA) 100-25 MCG/INH inhaler Inhale 1 puff into the lungs daily 1 Inhaler 11     HYDROcodone-acetaminophen (NORCO) 7.5-325 MG per tablet Take 1 tablet by mouth every 12 hours Ok to dispense and start 08/12/21 60 tablet 0     hydroxychloroquine (PLAQUENIL) 200 MG tablet Take 2 tablets (400 mg) by mouth daily Annual Plaquenil toxicity eye screening required. 180 tablet 1     insulin glargine (BASAGLAR KWIKPEN) 100 UNIT/ML pen INJECT 22 UNITS SUBCUTANEOUS DAILY (TO REPLACE LANTUS) 15 mL 1     lidocaine (LIDODERM) 5 % patch APPLY PATCH TO PAINFUL AREA FOR UP TO 12 H WITHIN A 24 H PERIOD. REMOVE AFTER 12 HOURS. 30 patch 2     loratadine (CLARITIN) 10 MG tablet TAKE 1 TABLET BY MOUTH EVERY DAY 90 tablet 1     methocarbamol (ROBAXIN) 750 MG tablet Take 1 tablet (750 mg) by mouth 3 times daily as needed for muscle spasms 90 tablet 3     metoprolol succinate ER (TOPROL-XL) 25 MG 24 hr tablet TAKE 1/2 TABLET BY MOUTH 2 TIMES DAILY WITH 50 MG FOR A TOTAL OF 62.5 TWICE A DAY 90 tablet 1     metoprolol succinate ER (TOPROL-XL) 50 MG 24 hr tablet Take 1  tablet (50 mg) by mouth daily (in combination with 12.5mg for a total of 62.5mg twice a day)       montelukast (SINGULAIR) 10 MG tablet TAKE 1 TABLET BY MOUTH EVERYDAY AT BEDTIME 90 tablet 0     NEW MED Estriol 1 mg/g  Apply small amount to finger and apply to inside vagina daily for 2 weeks then twice weekly  Route: vaginally  Dispense 30 grams   4 refills 30 g 4     NOVOLOG FLEXPEN 100 UNIT/ML soln INJECT 12 UNITS SUBCUTANEOUS 3 TIMES DAILY (WITH MEALS) 15 mL 0     potassium chloride ER (KLOR-CON) 20 MEQ CR tablet Take 2 tablets (40 mEq) by mouth every morning AND 1 tablet (20 mEq) every evening. 270 tablet 3     predniSONE (DELTASONE) 5 MG tablet Take 1 tablet (5 mg) by mouth daily 30 tablet 0     Semaglutide, 1 MG/DOSE, (OZEMPIC, 1 MG/DOSE,) 4 MG/3ML SOPN INJECT 1 MG SUBCUTANEOUS EVERY 7 DAYS 3 mL 0     spironolactone (ALDACTONE) 25 MG tablet Take 2 tablets (50 mg) by mouth daily 180 tablet 3     torsemide (DEMADEX) 10 MG tablet Take one tab (10 mg) with one 20 mg tab to = 30 mg daily in afternoon. 90 tablet 0     torsemide (DEMADEX) 20 MG tablet Take 2 tablets (40 mg) by mouth every morning AND 1 tablet (20 mg) daily at 2 pm. Take the afternoon dose with an extra 10 mg tab for a total of 30 mg in the afternoon 270 tablet 0     traZODone (DESYREL) 50 MG tablet TAKE 0.5-1.5 TABLETS (25-75 MG) BY MOUTH NIGHTLY AS NEEDED FOR SLEEP 135 tablet 0     XARELTO ANTICOAGULANT 20 MG TABS tablet TAKE 1 TABLET BY MOUTH DAILY WITH DINNER 90 tablet 0       ROS:   Constitutional: No fever, chills. No weight gain/loss.   ENT: eye hematoma ear ache, epistaxis, sore throat.   Allergies/Immunologic: Negative.   Respiratory: + intermittent cough, no hemoptysis.   Cardiovascular: As per HPI.   GI: No nausea, vomiting, hematemesis, melena, or hematochezia.   : No urinary frequency, dysuria, or hematuria.   Integument: Negative.   Psychiatric: Negative.   Neuro: Negative.   Endocrinology: Negative.   Musculoskeletal: hip  pain    EXAM:    /65 (BP Location: Right arm, Patient Position: Chair, Cuff Size: Adult Regular)   Pulse 56   Wt 87.9 kg (193 lb 12.8 oz)   SpO2 96%   BMI 30.35 kg/m    General: appears comfortable, alert and articulate  Head: normocephalic, atraumatic  Neck: no adenopathy  Orophyarynx: moist mucosa, no lesions, dentition intact  Heart: regular, S1/S2, no murmur, gallop, rub, estimated JVP <10   Lungs: diffuse expiratory wheezing, no crackles,no dullness   Abdomen: soft, non-tender, bowel sounds present, no hepatosplenomegaly  Extremities: trace LE edema, no clubbing, cyanosis.- right wrist wrapped   Neurological: normal speech and affect, no gross motor deficits    Labs:   HgA1c 7%       Procedure  Echocardiogram with two-dimensional, color and spectral Doppler performed.  ______________________________________________________________________________  Interpretation Summary  Global and regional left ventricular function is normal with an EF of 55-60%.  The right ventricle is normal size. Global right ventricular function is  normal.  No significant valvular abnormalities.  IVC diameter >2.1 cm collapsing <50% with sniff suggests a high RA pressure  estimated at 15 mmHg or greater.  This study was compared with the study from 08/27/2020. The LA filling  pressures are higher. There are no significant changes in the biventricular  function or aortic calibers.  ______________________________________________________________________________      Regadenosen MPI 2014: no fixed or inducible defects    Assessment and Plan:  In summary this is a very pleasant 81 year old female with suspected HFpEF who is here for HFpEF follow up.     In support of the diagnosis of HFpEF includes mild LA enlargement, echocardiographic signs of increase LV filling pressures, increased nt-bnp, as well as a diuretic requirement and symptomatic improvement on diuretics.    The risk factors for HFpEF in her include age, gender, HTN,  diabetes, sleep apnea and obesity.  A prior stress test was negative for CAD. She is presently euvolemic on exam and is NYHA class II(mutifactorial).    The mainstays of therapy for HFpEF include volume management, blood pressure control, treating underlying sleep apnea if present, weight management, exercise training, rate control for atrial fibrillation as well as consideration for a rhythm control strategy, as well as consideration for clinical trials.  I do have her on spironolactone given the results of the TOPCAT trial. Patients have symptomatically felt very improved on this medication. Her a fib rates have been under better control recently and her ischemia evaluation is negative.     Plan for HFpEF: continue current dose of diuretics, BP is controlled, volume status is euvolemic     Other:   Hypertension: controlled    Atrial fibrillation [id number] (age >75, HTN, CHF, gender) - rates controlled on metoprolol , on a xarelto.  She is actually sinus right now, by exam and on the echocardiogram.      Primary prevention: on statin, no ASA given she is on anti-coagulation     Follow up in 6 months with [facility], 1year with me with an echo     Radha Rosa MD  Associate Professor of Medicine   [facility] Division of Cardiology

## 2021-09-09 NOTE — PROGRESS NOTES
September 9, 2021    Follow up HFpEF    HPI:   82 yo F with interstitial lung disease (moderate restriction), Sjogren's syndrome, HTN, atrial fibrillation, diverticulitis s/p colectomy 2011 who I am following for HFpEF. She is here for routine HFpEF follow up.     She has only occasional lightheadedness, no syncope, no chest pain, no PND, no orthopnea, or lower extremity edema.      She does wear her CPAP mask routinely.     Exercise tolerance is mostly limited by hip and back pain. She does have fatigue and KIMBLE with 1-2 blocks of walking.     PAST MEDICAL HISTORY:  Past Medical History:   Diagnosis Date     Alcohol abuse, in remission      Allergic rhinitis, cause unspecified     allegra helps when she takes it     Antiplatelet or antithrombotic long-term use      Atrial fibrillation (H)     in hosp in 11/11 after surgery w/ fluid overload     Cardiomegaly     LVH on stress echo- cardiac w/u at [facility] ER- neg CT scan for PE, neg stress echo in 8/06     Chest pain, unspecified      Disorder of bone and cartilage, unspecified     osteopenia (had been on prempro), improved on 6/06 dexa, stable dexa 11/10     Diverticulosis of colon (without mention of hemorrhage)     last episode yrs ago     Essential hypertension, benign      Follicular bronchiolitis (H)     associated with Sjogrens, dx by chest CT showing mosaic attenuation and air trapping     Gastro-oesophageal reflux disease      ILD (interstitial lung disease) (H)     associated with Sjogrens, also has mildly elevated IgG4, first noted on chest CT 2015 (mild changes) and also has small airways disease; ILD improved on follow up chest CT 2018.     Insomnia, unspecified     weaned off clonazepam     Irregular heart beat      Lumbago 7/09    MRI with NEAL, now seeing Dr. Cain for sciatic sx's     Major depressive disorder, recurrent episode, moderate (H)      Obstructive sleep apnea     uses cpap     Osteoarthrosis, unspecified whether generalized or localized,  unspecified site      Sjogren's syndrome (H)     + RG and SSA and lip bx     Sleep apnea      Tobacco use disorder     chantix in 9/07, started again in 6/08, working     FAMILY HISTORY:  Mother had MI and CHF in her 60's. 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    CURRENT MEDICATIONS:  Current Outpatient Medications   Medication Sig Dispense Refill     ACCU-CHEK SHANNON PLUS test strip USE TO TEST BLOOD SUGARS 3 TIMES DAILY 300 strip 5     acetaminophen (TYLENOL) 500 MG tablet Take 1,000 mg by mouth every 8 hours as needed (max 6 tablets/24 hours, 2 tablets/dose)        acyclovir (ZOVIRAX) 400 MG tablet Take 1 tablet (400 mg) by mouth 3 times daily For a couple days 15 tablet 2     acyclovir (ZOVIRAX) 5 % external ointment Apply topically 6 times daily As needed for outbreaks 15 g 3     albuterol (PROAIR HFA, PROVENTIL HFA, VENTOLIN HFA) 108 (90 BASE) MCG/ACT inhaler Inhale 2 puffs into the lungs every 6 hours 1 Inhaler 3     alendronate (FOSAMAX) 70 MG tablet Take 1 tablet (70 mg) by mouth every 7 days 12 tablet 2     anakinra (KINERET) 100 MG/0.67ML SOSY injection 100 mg every day x 3 days as needed for flare ups 6.7 mL 3     atorvastatin (LIPITOR) 10 MG tablet TAKE 1/2 TABLET BY MOUTH EVERY DAY 45 tablet 3     azithromycin (ZITHROMAX) 250 MG tablet Take 1 tablet (250 mg) by mouth daily 30 tablet 11     BD JANE U/F 32G X 4 MM insulin pen needle USE 4 DAILY AS DIRECTED. 400 each 0     blood glucose (NO BRAND SPECIFIED) lancets standard Use to test blood sugar 3 times daily or as directed 100 lancet 3     cephALEXin (KEFLEX) 250 MG capsule Take 1 capsule (250 mg) by mouth every 6 hours 20 capsule 0     cevimeline (EVOXAC) 30 MG capsule Take 1 capsule (30 mg) by mouth 3 times daily 270 capsule 2     Cholecalciferol (VITAMIN D3) 50 MCG (2000 UT) CAPS Take 1 tablet (50 mcg) by mouth daily - Oral 90 capsule 2     COMPOUNDED  NON-CONTROLLED SUBSTANCE (CMPD RX) - PHARMACY TO MIX COMPOUNDED MEDICATION Estriol 1 mg/g Apply small amount to finger and apply to inside vagina daily for 2 weeks then twice weekly Route: vaginally Dispense 30 grams 11 refills 30 g 11     escitalopram (LEXAPRO) 20 MG tablet TAKE 1 TABLET BY MOUTH EVERY DAY 90 tablet 0     famotidine (PEPCID) 10 MG tablet Take 10 mg by mouth 2 times daily       fluticasone (FLONASE) 50 MCG/ACT nasal spray Spray 1-2 sprays into both nostrils daily as needed for allergies 18.2 mL 11     fluticasone-vilanterol (BREO ELLIPTA) 100-25 MCG/INH inhaler Inhale 1 puff into the lungs daily 1 Inhaler 11     HYDROcodone-acetaminophen (NORCO) 7.5-325 MG per tablet Take 1 tablet by mouth every 12 hours Ok to dispense and start 08/12/21 60 tablet 0     hydroxychloroquine (PLAQUENIL) 200 MG tablet Take 2 tablets (400 mg) by mouth daily Annual Plaquenil toxicity eye screening required. 180 tablet 1     insulin glargine (BASAGLAR KWIKPEN) 100 UNIT/ML pen INJECT 22 UNITS SUBCUTANEOUS DAILY (TO REPLACE LANTUS) 15 mL 1     lidocaine (LIDODERM) 5 % patch APPLY PATCH TO PAINFUL AREA FOR UP TO 12 H WITHIN A 24 H PERIOD. REMOVE AFTER 12 HOURS. 30 patch 2     loratadine (CLARITIN) 10 MG tablet TAKE 1 TABLET BY MOUTH EVERY DAY 90 tablet 1     methocarbamol (ROBAXIN) 750 MG tablet Take 1 tablet (750 mg) by mouth 3 times daily as needed for muscle spasms 90 tablet 3     metoprolol succinate ER (TOPROL-XL) 25 MG 24 hr tablet TAKE 1/2 TABLET BY MOUTH 2 TIMES DAILY WITH 50 MG FOR A TOTAL OF 62.5 TWICE A DAY 90 tablet 1     metoprolol succinate ER (TOPROL-XL) 50 MG 24 hr tablet Take 1 tablet (50 mg) by mouth daily (in combination with 12.5mg for a total of 62.5mg twice a day)       montelukast (SINGULAIR) 10 MG tablet TAKE 1 TABLET BY MOUTH EVERYDAY AT BEDTIME 90 tablet 0     NEW MED Estriol 1 mg/g  Apply small amount to finger and apply to inside vagina daily for 2 weeks then twice weekly  Route:  vaginally  Dispense 30 grams   4 refills 30 g 4     NOVOLOG FLEXPEN 100 UNIT/ML soln INJECT 12 UNITS SUBCUTANEOUS 3 TIMES DAILY (WITH MEALS) 15 mL 0     potassium chloride ER (KLOR-CON) 20 MEQ CR tablet Take 2 tablets (40 mEq) by mouth every morning AND 1 tablet (20 mEq) every evening. 270 tablet 3     predniSONE (DELTASONE) 5 MG tablet Take 1 tablet (5 mg) by mouth daily 30 tablet 0     Semaglutide, 1 MG/DOSE, (OZEMPIC, 1 MG/DOSE,) 4 MG/3ML SOPN INJECT 1 MG SUBCUTANEOUS EVERY 7 DAYS 3 mL 0     spironolactone (ALDACTONE) 25 MG tablet Take 2 tablets (50 mg) by mouth daily 180 tablet 3     torsemide (DEMADEX) 10 MG tablet Take one tab (10 mg) with one 20 mg tab to = 30 mg daily in afternoon. 90 tablet 0     torsemide (DEMADEX) 20 MG tablet Take 2 tablets (40 mg) by mouth every morning AND 1 tablet (20 mg) daily at 2 pm. Take the afternoon dose with an extra 10 mg tab for a total of 30 mg in the afternoon 270 tablet 0     traZODone (DESYREL) 50 MG tablet TAKE 0.5-1.5 TABLETS (25-75 MG) BY MOUTH NIGHTLY AS NEEDED FOR SLEEP 135 tablet 0     XARELTO ANTICOAGULANT 20 MG TABS tablet TAKE 1 TABLET BY MOUTH DAILY WITH DINNER 90 tablet 0       ROS:   Constitutional: No fever, chills. No weight gain/loss.   ENT: eye hematoma ear ache, epistaxis, sore throat.   Allergies/Immunologic: Negative.   Respiratory: + intermittent cough, no hemoptysis.   Cardiovascular: As per HPI.   GI: No nausea, vomiting, hematemesis, melena, or hematochezia.   : No urinary frequency, dysuria, or hematuria.   Integument: Negative.   Psychiatric: Negative.   Neuro: Negative.   Endocrinology: Negative.   Musculoskeletal: hip pain    EXAM:    /65 (BP Location: Right arm, Patient Position: Chair, Cuff Size: Adult Regular)   Pulse 56   Wt 87.9 kg (193 lb 12.8 oz)   SpO2 96%   BMI 30.35 kg/m    General: appears comfortable, alert and articulate  Head: normocephalic, atraumatic  Neck: no adenopathy  Orophyarynx: moist mucosa, no lesions,  dentition intact  Heart: regular, S1/S2, no murmur, gallop, rub, estimated JVP <10   Lungs: diffuse expiratory wheezing, no crackles,no dullness   Abdomen: soft, non-tender, bowel sounds present, no hepatosplenomegaly  Extremities: trace LE edema, no clubbing, cyanosis.- right wrist wrapped   Neurological: normal speech and affect, no gross motor deficits    Labs:   HgA1c 7%       Procedure  Echocardiogram with two-dimensional, color and spectral Doppler performed.  ______________________________________________________________________________  Interpretation Summary  Global and regional left ventricular function is normal with an EF of 55-60%.  The right ventricle is normal size. Global right ventricular function is  normal.  No significant valvular abnormalities.  IVC diameter >2.1 cm collapsing <50% with sniff suggests a high RA pressure  estimated at 15 mmHg or greater.  This study was compared with the study from 08/27/2020. The LA filling  pressures are higher. There are no significant changes in the biventricular  function or aortic calibers.  ______________________________________________________________________________      Regadenosen MPI 2014: no fixed or inducible defects    Assessment and Plan:  In summary this is a very pleasant 81 year old female with suspected HFpEF who is here for HFpEF follow up.     In support of the diagnosis of HFpEF includes mild LA enlargement, echocardiographic signs of increase LV filling pressures, increased nt-bnp, as well as a diuretic requirement and symptomatic improvement on diuretics.    The risk factors for HFpEF in her include age, gender, HTN, diabetes, sleep apnea and obesity.  A prior stress test was negative for CAD. She is presently euvolemic on exam and is NYHA class II(mutifactorial).    The mainstays of therapy for HFpEF include volume management, blood pressure control, treating underlying sleep apnea if present, weight management, exercise training, rate  control for atrial fibrillation as well as consideration for a rhythm control strategy, as well as consideration for clinical trials.  I do have her on spironolactone given the results of the TOPCAT trial. Patients have symptomatically felt very improved on this medication. Her a fib rates have been under better control recently and her ischemia evaluation is negative.     Plan for HFpEF: continue current dose of diuretics, BP is controlled, volume status is euvolemic     Other:   Hypertension: controlled    Atrial fibrillation [id number] (age >75, HTN, CHF, gender) - rates controlled on metoprolol , on a xarelto.  She is actually sinus right now, by exam and on the echocardiogram.      Primary prevention: on statin, no ASA given she is on anti-coagulation     Follow up in 6 months with [facility], 1year with me with an echo     Radha Rosa MD  Associate Professor of Medicine   [facility] Division of Cardiology

## 2021-09-09 NOTE — NURSING NOTE
Diet: Patient instructed regarding a heart failure healthy diet, including discussion of reduced fat and 2000 mg daily sodium restriction, daily weights, medication purpose and compliance, fluid restrictions and resources for patient and family to access for assistance with heart failure management.       Labs: Patient was given results of the laboratory testing obtained today and patient was instructed about when to return for the next laboratory testing.     Med Reconcile: Reviewed and verified all current medications with the patient. The updated medication list was printed and given to the patient.     Med changes: none    Return Appointment: Patient given instructions regarding scheduling next clinic visit: [facility] in 6 months, [facility] in 1 year with labs and echo    Patient stated she understood all health information given and agreed to call with further questions or concerns.     Flor Velasquez RN

## 2021-09-09 NOTE — NURSING NOTE
Chief Complaint   Patient presents with     Follow Up     reason for visit: HFpEF, labs and echo prior      Vitals were taken and medications reconciled.    Wale Atwood, EMT  2:52 PM

## 2021-09-10 RX ORDER — METOPROLOL SUCCINATE 25 MG/1
12.5 TABLET, EXTENDED RELEASE ORAL DAILY
Qty: 45 TABLET | Refills: 3 | Status: SHIPPED | OUTPATIENT
Start: 2021-09-10 | End: 2022-02-11

## 2021-09-10 NOTE — PROGRESS NOTES
Assessment & Plan       ICD-10-CM    1. Type 2 diabetes mellitus with hyperglycemia, with long-term current use of insulin (H)  E11.65 REVIEW OF HEALTH MAINTENANCE PROTOCOL ORDERS    Z79.4 Basic metabolic panel  (Ca, Cl, CO2, Creat, Gluc, K, Na, BUN)   2. Essential hypertension with goal blood pressure less than 140/90  I10 Basic metabolic panel  (Ca, Cl, CO2, Creat, Gluc, K, Na, BUN)   3. Heart failure with preserved ejection fraction, unspecified HF chronicity (H)  I50.30 REVIEW OF HEALTH MAINTENANCE PROTOCOL ORDERS   4. Hyperlipidemia LDL goal <100  E78.5 REVIEW OF HEALTH MAINTENANCE PROTOCOL ORDERS   5. Major depressive disorder, recurrent episode, moderate (H)  F33.1    6. Stage 3b chronic kidney disease  N18.32       DM II- A1C in good control at last check in 6/21 at 7.1 (down from 8.7 to 8.0, then 7.1).  Will check again with yearly labs in ~2 months.  Glucose monitoring has looked good.  BP in good control on recheck today.    Lipids in good control with lipitor.  Will cont current meds.  No refills needed today per pt.    CKD- Pt has had GFR variability for yrs, but lower numbers in the 40-50s have persisted for the last year.  Lowest at 33 in 12/20, now in the 47-52 range since 1/21.  Multiple reasons for kidney strain- DM, HTN, diurectic use... Pt declines renal referral at this point, but will consider if further worsening.    MDD- stable sx's despite stressors with her [facility] [place]- difference in opinions on structure, very stressful, but she's holding up okay.  Will cont lexapro at 20mg/d for now, but consider lower dose when less stressors.    R groin/hip pain- continues to be very bothersome for pt.  Continues to f/u with Dr. Moore.  Just restarted pool therapy today which hopefully will be great.  Cardiologist recommended looking into medical marijuana, so pt is reaching out to Dr. Moore about that possibility.  He discussed looking at OTC CBD options, and they will do that as well.    Cont f/u  with urology for incontinence, and rheumatology for [place], pulmonary for ILD.        Return in about 2 months (around 11/14/2021) for Diabetes, Routine Visit/Chronic Cares/Med Check, Fasting labs prior, Blood Pressure Recheck.        54 minutes spent on the date of the encounter doing chart review, review of outside records, review of test results, interpretation of tests, patient visit and documentation       Ilene Tristan MD  [facility]          Subjective   Sister Betty is a 81 year old who presents for the following health issues- multiple issues    HPI     Diabetes Follow-up  How often are you checking your blood sugar? Three times daily  Blood sugar testing frequency justification:  Uncontrolled diabetes  What time of day are you checking your blood sugars (select all that apply)?  Before and after meals  Have you had any blood sugars above 200?  Yes - but likely error- at 500, but 30 min later after cleaning hands was down in normal range.  Have you had any blood sugars below 70?  No    What symptoms do you notice when your blood sugar is low?  Blurred vision but thinks it's related to the glasses     What concerns do you have today about your diabetes? Other: pt states that after cooking a meal and eating she gets hungry after an hour - not sure if its related to diabetes so in between meals she gets hungry and believes she should not be hungry as she has had a good meal      Do you have any of these symptoms? (Select all that apply)  Numbness in feet, Burning in feet and Redness, sores, or blisters on feet    DM II - A1C has been improving- from 8.7 to 6.1 in 6/21.   Pt thinks it's been going well- checking glucose few times a day.  Ranges- ~120-130.  On basaglar, novolog and ozempic.  Will be due for yearly labs in ~10/21.  Can get A1C with those yearly labs - likely in ~11/21.    BP recheck 124/84.    R groin/hip pain- still significant issue for her.  Really affects  lifting her leg and turning her leg.  She did have a pool appt today.  Amanda Quinones.  Had one appt in July, but couldn't get back in until now.  Second appt today, but now should be going more often.  Has been using walker since 1/20- goal is to get off it.    Cardiologist- had an appt there Thur.    She is recommending cannibus for pain medication option.  She thought she'd do better on that than the occasional percocet or flexeril pt is using.  Tylenol is not helping.  Pt is still having pain.       Incontinence- pt did see Dr. Nassar again. Was rx'd vaginal 'goop', and she doesn't like it- stinks.    Still relying on getting to bathroom as soon as she has has the urge.        MDD-   On lexapro 20mg/d.  Mood is 'fairly good', even though significant stressors in their [place] community is 'not healthy' right now.  She's bothered by it, but holding up okay.  Really stressful.  Will try and lowere dose in future, but currently not a great time to try.      Hyperlipidemia Follow-Up    Are you regularly taking any medication or supplement to lower your cholesterol?   Yes,Lipitor     Are you having muscle aches or other side effects that you think could be caused by your cholesterol lowering medication?  No    Hypertension Follow-up    Do you check your blood pressure regularly outside of the clinic? Sometimes      Are you following a low salt diet? Yes    Are your blood pressures ever more than 140 on the top number (systolic) OR more   than 90 on the bottom number (diastolic), for example 140/90? Not sure     BP Readings from Last 2 Encounters:   09/14/21 124/84   09/09/21 121/65     Hemoglobin A1C (%)   Date Value   06/04/2021 7.1 (H)   03/03/2021 8.0 (H)     LDL Cholesterol Calculated (mg/dL)   Date Value   10/20/2020 19   01/21/2020 1         How many servings of fruits and vegetables do you eat daily?  Morning a pear/bannana, snacks on grapes and balance meal with salads total of 4-5 servings a day    On  "average, how many sweetened beverages do you drink each day (Examples: soda, juice, sweet tea, etc.  Do NOT count diet or artificially sweetened beverages)?   0    How many days per week do you miss taking your medication? 0        Review of Systems   Constitutional, HEENT, cardiovascular, pulmonary, gi and gu systems are negative, except as otherwise noted.      Objective    /84   Pulse 68   Temp 99.3  F (37.4  C)   Ht 1.702 m (5' 7\")   Wt 89.9 kg (198 lb 1.6 oz)   SpO2 98%   BMI 31.03 kg/m    Body mass index is 31.03 kg/m .  Physical Exam   GENERAL APPEARANCE: healthy, alert and no distress     EYES: sclera clear, EOMI     RESP: lungs clear to auscultation - no rales, rhonchi or wheezes     CV: regular rates and rhythm, normal S1 S2, no S3 or S4 and no murmur, click or rub      Ext: warm, dry, no edema       Psych: full range affect, normal speech and grooming, judgement and insight intact             "

## 2021-09-13 ENCOUNTER — MYC MEDICAL ADVICE (OUTPATIENT)
Dept: PALLIATIVE MEDICINE | Facility: CLINIC | Age: 81
End: 2021-09-13

## 2021-09-14 ENCOUNTER — OFFICE VISIT (OUTPATIENT)
Dept: FAMILY MEDICINE | Facility: CLINIC | Age: 81
End: 2021-09-14
Payer: MEDICARE

## 2021-09-14 ENCOUNTER — MYC MEDICAL ADVICE (OUTPATIENT)
Dept: CARDIOLOGY | Facility: CLINIC | Age: 81
End: 2021-09-14

## 2021-09-14 VITALS
DIASTOLIC BLOOD PRESSURE: 84 MMHG | SYSTOLIC BLOOD PRESSURE: 124 MMHG | HEART RATE: 68 BPM | BODY MASS INDEX: 31.09 KG/M2 | TEMPERATURE: 99.3 F | OXYGEN SATURATION: 98 % | HEIGHT: 67 IN | WEIGHT: 198.1 LBS

## 2021-09-14 DIAGNOSIS — I50.30 HEART FAILURE WITH PRESERVED EJECTION FRACTION, UNSPECIFIED HF CHRONICITY (H): ICD-10-CM

## 2021-09-14 DIAGNOSIS — F33.1 MAJOR DEPRESSIVE DISORDER, RECURRENT EPISODE, MODERATE (H): ICD-10-CM

## 2021-09-14 DIAGNOSIS — E78.5 HYPERLIPIDEMIA LDL GOAL <100: ICD-10-CM

## 2021-09-14 DIAGNOSIS — I10 ESSENTIAL HYPERTENSION WITH GOAL BLOOD PRESSURE LESS THAN 140/90: ICD-10-CM

## 2021-09-14 DIAGNOSIS — E11.65 TYPE 2 DIABETES MELLITUS WITH HYPERGLYCEMIA, WITH LONG-TERM CURRENT USE OF INSULIN (H): Primary | ICD-10-CM

## 2021-09-14 DIAGNOSIS — N18.32 STAGE 3B CHRONIC KIDNEY DISEASE (H): ICD-10-CM

## 2021-09-14 DIAGNOSIS — Z79.4 TYPE 2 DIABETES MELLITUS WITH HYPERGLYCEMIA, WITH LONG-TERM CURRENT USE OF INSULIN (H): Primary | ICD-10-CM

## 2021-09-14 PROCEDURE — 99215 OFFICE O/P EST HI 40 MIN: CPT | Performed by: FAMILY MEDICINE

## 2021-09-14 ASSESSMENT — ANXIETY QUESTIONNAIRES
5. BEING SO RESTLESS THAT IT IS HARD TO SIT STILL: SEVERAL DAYS
7. FEELING AFRAID AS IF SOMETHING AWFUL MIGHT HAPPEN: NOT AT ALL
IF YOU CHECKED OFF ANY PROBLEMS ON THIS QUESTIONNAIRE, HOW DIFFICULT HAVE THESE PROBLEMS MADE IT FOR YOU TO DO YOUR WORK, TAKE CARE OF THINGS AT HOME, OR GET ALONG WITH OTHER PEOPLE: SOMEWHAT DIFFICULT
1. FEELING NERVOUS, ANXIOUS, OR ON EDGE: SEVERAL DAYS
2. NOT BEING ABLE TO STOP OR CONTROL WORRYING: NOT AT ALL
GAD7 TOTAL SCORE: 5
6. BECOMING EASILY ANNOYED OR IRRITABLE: MORE THAN HALF THE DAYS
3. WORRYING TOO MUCH ABOUT DIFFERENT THINGS: NOT AT ALL

## 2021-09-14 ASSESSMENT — PATIENT HEALTH QUESTIONNAIRE - PHQ9
5. POOR APPETITE OR OVEREATING: SEVERAL DAYS
SUM OF ALL RESPONSES TO PHQ QUESTIONS 1-9: 8

## 2021-09-14 ASSESSMENT — MIFFLIN-ST. JEOR: SCORE: 1396.21

## 2021-09-14 NOTE — TELEPHONE ENCOUNTER
Will leave encounter open for patient response/chart review by nursing.       ----------------Mychart Below from pt----------------  Dr. Moore,  Thank you for your response.  I sent a message to Dr. Radha Rosa asking if she would send you an email so you could complete my certification.  I will also try over the counter CBD tinctures to see if that will help before going to the medical cannabis.  Betty Buckner        --------------Mychart below response to pt----------------  Hello,     I just wanted to clarify that Dr Moore needs an email address of yours so that he can certify you. [facility] will use this email as means to send you the enrollment information. Thanks!    Claudia LANDON, RN Care Coordinator  [facility]  Pain Management

## 2021-09-14 NOTE — TELEPHONE ENCOUNTER
Routing to provider to review. I do not see MM was discussed at 21 appointments. Should patient make follow up to discuss?    ----------------Mychart Below from pt----------------  Dr. Moore,  At my appointment last week with my cardiologist, Dr. Radha Rosa, she recommended that I look into medical cannabis for the persistent pain I have.  She said she would support that approach instead of the pain meds I am on.  Is this something that you could prescribe for me?    Thank you,  Betty Buckner    .8      Claudia LANDON, RN Care Coordinator  [facility]  Pain Management

## 2021-09-14 NOTE — TELEPHONE ENCOUNTER
Following sent:    I think we can certainly try this. I would need an email to complete your certification.     The following website has all the information about the program including costs. [email or web address]    You could also consider trying over the counter CBD tinctures before the medical cannabis. Some patients have improvement with CBD alone and it is usually much cheaper.    Take care,    Charly Moore DO  [facility] Pain Management

## 2021-09-15 ASSESSMENT — ANXIETY QUESTIONNAIRES: GAD7 TOTAL SCORE: 5

## 2021-09-15 NOTE — TELEPHONE ENCOUNTER
From: Sister Betty Tee      Created: 9/14/2021 5:40 PM        *-*-*This message has not been handled.*-*-*    My email address is [email or web address]     Thank you.  Betty Buckner        Will forward to Dr. Moore to review and certify for medical cannabis if appropriate.    Bola Mendoza RN  Patient Care Supervisor   [facility] [place]

## 2021-09-16 DIAGNOSIS — I48.21 PERMANENT ATRIAL FIBRILLATION (H): ICD-10-CM

## 2021-09-16 DIAGNOSIS — I50.30 (HFPEF) HEART FAILURE WITH PRESERVED EJECTION FRACTION (H): ICD-10-CM

## 2021-09-16 NOTE — TELEPHONE ENCOUNTER
Following sent:    Ken Hernández,    I completed the certification, you should be getting an email to complete the rest of the paper work.    Take care,    Charly Moore [facility]

## 2021-09-18 DIAGNOSIS — I50.32 CHRONIC DIASTOLIC CONGESTIVE HEART FAILURE (H): ICD-10-CM

## 2021-09-20 RX ORDER — SPIRONOLACTONE 25 MG/1
50 TABLET ORAL DAILY
Qty: 180 TABLET | Refills: 3 | Status: SHIPPED | OUTPATIENT
Start: 2021-09-20 | End: 2022-11-02

## 2021-09-20 NOTE — TELEPHONE ENCOUNTER
spironolactone (ALDACTONE) 25 MG tablet    Take 2 tablets (50 mg) by mouth daily     Last Written Prescription Date:  8/31/20  Last Fill Quantity: 180,   # refills: 3  Last Office Visit : 9/9/21  Follow up in 6 months with [facility], 1year with me with an echo   Future Office visit:  none    Routing refill request to provider for review/approval because:  Abnormal lab - creatinine     Lab Test 09/09/21  1316   CR 1.07*

## 2021-09-30 DIAGNOSIS — G89.29 OTHER CHRONIC PAIN: ICD-10-CM

## 2021-09-30 DIAGNOSIS — M16.11 OSTEOARTHRITIS OF RIGHT HIP, UNSPECIFIED OSTEOARTHRITIS TYPE: ICD-10-CM

## 2021-09-30 NOTE — LETTER
Opioid / Opioid Plus Controlled Substance Agreement    This is an agreement between you and your provider about the safe and appropriate use of controlled substance/opioids prescribed by your care team. Controlled substances are medicines that can cause physical and mental dependence (abuse).    There are strict laws about having and using these medicines. We here at [facility] are committing to working with you in your efforts to get better. To support you in this work, we ll help you schedule regular office appointments for medicine refills. If we must cancel or change your appointment for any reason, we ll make sure you have enough medicine to last until your next appointment.     As a Provider, I will:    Listen carefully to your concerns and treat you with respect.     Recommend a treatment plan that I believe is in your best interest. This plan may involve therapies other than opioid pain medication.     Talk with you often about the possible benefits, and the risk of harm of any medicine that we prescribe for you.     Provide a plan on how to taper (discontinue or go off) using this medicine if the decision is made to stop its use.    As a Patient, I understand that opioid(s):     Are a controlled substance prescribed by my care team to help me function or work and manage my condition(s).     Are strong medicines and can cause serious side effects such as:    Drowsiness, which can seriously affect my driving ability    A lower breathing rate, enough to cause death    Harm to my thinking ability     Depression     Abuse of and addiction to this medicine    Need to be taken exactly as prescribed. Combining opioids with certain medicines or chemicals (such as illegal drugs, sedatives, sleeping pills, and benzodiazepines) can be dangerous or even fatal. If I stop opioids suddenly, I may have severe withdrawal symptoms.    Do not work for all types of pain nor for all patients. If they re not helpful, I may  be asked to stop them.    The risks, benefits and side effects of these medicine(s) were explained to me. I agree that:  1. I will take part in other treatments as advised by my care team. This may be psychiatry or counseling, physical therapy, behavioral therapy, group treatment or a referral to a specialist.     2. I will keep all my appointments. I understand that this is part of the monitoring of opioids. My care team may require an office visit for EVERY opioid/controlled substance refill. If I miss appointments or don t follow instructions, my care team may stop my medicine.    3. I will take my medicines as prescribed. I will not change the dose or schedule unless my care team tells me to. There will be no refills if I run out early.     4. I may be asked to come to the clinic and complete a urine drug test or complete a pill count at any time. If I don t give a urine sample or participate in a pill count, the care team may stop my medicine.    5. I will only receive prescriptions from this clinic for chronic pain. If I am treated by another provider for acute pain issues, I will tell them that I am taking opioid pain medication for chronic pain and that I have a treatment agreement with this provider. I will inform my [facility] care team within one business day if I am given a prescription for any pain medication by another healthcare provider. My [facility] care team can contact other providers and pharmacists about my use of any medicines.    6. It is up to me to make sure that I don t run out of my medicines on weekends or holidays. If my care team is willing to refill my opioid prescription without a visit, I must request refills only during office hours. Refills may take up to 3 business days to process. I will use one pharmacy to fill all my opioid and other controlled substance prescriptions. I will notify the clinic about any changes to my insurance or medication availability.    7. I  am responsible for my prescriptions. If the medicine/prescription is lost, stolen or destroyed, it will not be replaced. I also agree not to share controlled substance medicines with anyone.    8. I am aware I should not use any illegal or recreational drugs. I agree not to drink alcohol unless my care team says I can.       9. If I enroll in the [place] Medical Cannabis program, I will tell my care team prior to my next refill.     10. I will tell my care team right away if I become pregnant, have a new medical problem treated outside of my regular clinic, or have a change in my medications.    11. I understand that this medicine can affect my thinking, judgment and reaction time. Alcohol and drugs affect the brain and body, which can affect the safety of my driving. Being under the influence of alcohol or drugs can affect my decision-making, behaviors, personal safety, and the safety of others. Driving while impaired (DWI) can occur if a person is driving, operating, or in physical control of a car, motorcycle, boat, snowmobile, ATV, motorbike, off-road vehicle, or any other motor vehicle ([place] Statute 169[place].20). I understand the risk if I choose to drive or operate any vehicle or machinery.    I understand that if I do not follow any of the conditions above, my prescriptions or treatment may be stopped or changed.          Opioids  What You Need to Know    What are opioids?   Opioids are pain medicines that must be prescribed by a doctor. They are also known as narcotics.     Examples are:   1. morphine (MS Contin, Radha)  2. oxycodone (Oxycontin)  3. oxycodone and acetaminophen (Percocet)  4. hydrocodone and acetaminophen (Vicodin, Norco)   5. fentanyl patch (Duragesic)   6. hydromorphone (Dilaudid)   7. methadone  8. codeine (Tylenol #3)     What do opioids do well?   Opioids are best for severe short-term pain such as after a surgery or injury. They may work well for cancer pain. They may help some people with  long-lasting (chronic) pain.     What do opioids NOT do well?   Opioids never get rid of pain entirely, and they don t work well for most patients with chronic pain. Opioids don t reduce swelling, one of the causes of pain.                                    Other ways to manage chronic pain and improve function include:       Treat the health problem that may be causing pain    Anti-inflammation medicines, which reduce swelling and tenderness, such as ibuprofen (Advil, Motrin) or naproxen (Aleve)    Acetaminophen (Tylenol)    Antidepressants and anti-seizure medicines, especially for nerve pain    Topical treatments such as patches or creams    Injections or nerve blocks    Chiropractic or osteopathic treatment    Acupuncture, massage, deep breathing, meditation, visual imagery, aromatherapy    Use heat or ice at the pain site    Physical therapy     Exercise    Stop smoking    Take part in therapy       Risks and side effects     Talk to your doctor before you start or decide to keep taking opioids. Possible side effects include:      Lowering your breathing rate enough to cause death    Overdose, including death, especially if taking higher than prescribed doses    Worse depression symptoms; less pleasure in things you usually enjoy    Feeling tired or sluggish    Slower thoughts or cloudy thinking    Being more sensitive to pain over time; pain is harder to control    Trouble sleeping or restless sleep    Changes in hormone levels (for example, less testosterone)    Changes in sex drive or ability to have sex    Constipation    Unsafe driving    Itching and sweating    Dizziness    Nausea, throwing up and dry mouth    What else should I know about opioids?    Opioids may lead to dependence, tolerance, or addiction.      Dependence means that if you stop or reduce the medicine too quickly, you will have withdrawal symptoms. These include loose poop (diarrhea), jitters, flu-like symptoms, nervousness and tremors.  Dependence is not the same as addiction.                       Tolerance means needing higher doses over time to get the same effect. This may increase the chance of serious side effects.      Addiction is when people improperly use a substance that harms their body, their mind or their relations with others. Use of opiates can cause a relapse of addiction if you have a history of drug or alcohol abuse.      People who have used opioids for a long time may have a lower quality of life, worse depression, higher levels of pain and more visits to doctors.    You can overdose on opioids. Take these steps to lower your risk of overdose:    1. Recognize the signs:  Signs of overdose include decrease or loss of consciousness (blackout), slowed breathing, trouble waking up and blue lips. If someone is worried about overdose, they should call 911.    2. Talk to your doctor about Narcan (naloxone).   If you are at risk for overdose, you may be given a prescription for Narcan. This medicine very quickly reverses the effects of opioids.   If you overdose, a friend or family member can give you Narcan while waiting for the ambulance. They need to know the signs of overdose and how to give Narcan.     3. Don't use alcohol or street drugs.   Taking them with opioids can cause death.    4. Do not take any of these medicines unless your doctor says it s OK. Taking these with opioids can cause death:    Benzodiazepines, such as lorazepam (Ativan), alprazolam (Xanax) or diazepam (Valium)    Muscle relaxers, such as cyclobenzaprine (Flexeril)    Sleeping pills like zolpidem (Ambien)     Other opioids      How to keep you and other people safe while taking opioids:    1. Never share your opioids with others.  Opioid medicines are regulated by the Drug Enforcement Agency (RAZ). Selling or sharing medications is a criminal act.    2. Be sure to store opioids in a secure place, locked up if possible. Young children can easily swallow them  and overdose.    3. When you are traveling with your medicines, keep them in the original bottles. If you use a pill box, be sure you also carry a copy of your medicine list from your clinic or pharmacy.    4. Safe disposal of opioids    Most pharmacies have places to get rid of medicine, called disposal kiosks. Medicine disposal options are also available in every [facility]. Search your county and  medication disposal  to find more options. You can find more details at:  [email or web address]-[email or web address]-unwanted-medications     I agree that my provider, clinic care team, and pharmacy may work with any city, state or federal law enforcement agency that investigates the misuse, sale, or other diversion of my controlled medicine. I will allow my provider to discuss my care with, or share a copy of, this agreement with any other treating provider, pharmacy or emergency room where I receive care.    I have read this agreement and have asked questions about anything I did not understand.    _______________________________________________________  Patient Signature - Betty Tee _____________________                   Date     _______________________________________________________  Provider Signature - Charly Moore MD   _____________________                   Date     _______________________________________________________  Witness Signature (required if provider not present while patient signing)   _____________________                   Date

## 2021-09-30 NOTE — TELEPHONE ENCOUNTER
Received call from patient requesting refill(s) of HYDROcodone-acetaminophen (NORCO) 7.5-325 MG per tablet     Last dispensed from pharmacy on 08/12/21    Patient's last office/virtual visit by prescribing provider on 09/02/21    Next office/virtual appointment scheduled for None    Last urine drug screen date 09/02/21    Current opioid agreement on file (completed within the last year) No Date of opioid agreement: None on file    E-prescribe to   [facility]/pharmacy #[id number]  [facility] [address] [address]  Phone: [phone] Fax: [phone]    Will route to nursing [place] for review and preparation of prescription(s).     Margarita Chen [facility]

## 2021-09-30 NOTE — TELEPHONE ENCOUNTER
Reason for call:  Medication   If this is a refill request, has the caller requested the refill from the pharmacy already? No  Will the patient be using a [facility]? No  Name of the pharmacy and phone number for the current request: [facility]/PHARMACY #[id number] - [place], [place] - [address]    Name of the medication requested: HYDROcodone-acetaminophen (NORCO) 7.5-325 MG per tablet    Other request: none    Phone number to reach patient:  Home number on file [phone] (home)    Best Time:  anytime    Can we leave a detailed message on this number?  YES    Travel screening: Not Applicable     Mya ESQUIVEL    [facility]

## 2021-10-01 ENCOUNTER — VIRTUAL VISIT (OUTPATIENT)
Dept: RHEUMATOLOGY | Facility: CLINIC | Age: 81
End: 2021-10-01
Attending: INTERNAL MEDICINE
Payer: MEDICARE

## 2021-10-01 DIAGNOSIS — Z79.899 LONG-TERM USE OF PLAQUENIL: ICD-10-CM

## 2021-10-01 DIAGNOSIS — M35.3 POLYMYALGIA RHEUMATICA (H): ICD-10-CM

## 2021-10-01 DIAGNOSIS — M10.9 GOUT, UNSPECIFIED CAUSE, UNSPECIFIED CHRONICITY, UNSPECIFIED SITE: ICD-10-CM

## 2021-10-01 DIAGNOSIS — M16.11 OSTEOARTHRITIS OF RIGHT HIP, UNSPECIFIED OSTEOARTHRITIS TYPE: Primary | ICD-10-CM

## 2021-10-01 PROCEDURE — 99214 OFFICE O/P EST MOD 30 MIN: CPT | Mod: 95 | Performed by: INTERNAL MEDICINE

## 2021-10-01 ASSESSMENT — PAIN SCALES - GENERAL: PAINLEVEL: MODERATE PAIN (4)

## 2021-10-01 NOTE — PROGRESS NOTES
Sister Betty is a 81 year old who is being evaluated via a billable video visit.      How would you like to obtain your AVS? MyChart  If the video visit is dropped, the invitation should be resent by: Send to e-mail at: [email or web address]  Will anyone else be joining your video visit? No      Video Start Time: 2:08 PM  Video-Visit Details    Type of service:  Video Visit    Video End Time:2:25 PM    Originating Location (pt. Location): Home    Distant Location (provider location):  [facility] [place]     Platform used for Video Visit: [facility]       Rheumatology Clinic Virtual Visit Note  Zulma Lopez MD  DOS: 10/1/2021  Date of last visit: 2021    Name: Betty Tee  MRN: [id number]  Age: 81 year old  : 1940  Reason for visit: Follow up visit for R hip pain sec severe OA, PMR, presumed gout flare of L foot, primary Sjogren's syndrome    # Sjogren's syndrome since  with borderline +GR, +SSA, and lip biopsy focus score of 1. Ongoing dry mouth on evoxac qod  # Follicular bronchiolitis, prior mild ILD   # Osteopenia on DEXA 2019 with T score=-2.1 with decline in bone density on fosamax  # Chronic prednisone use  # DM  # A fib  # Severe R hip OA with upcoming local steroid inj on norco prn  # PMR stable on prednisone 5 mg every day  #Presumed gout flare in L foot started today      Assessment and Plan:    New Dx of PMR. Severe R hip pain is due to severe OA based on 2020 hip MRI. She prefers to avoid hip arthroplasty. Her inflammation markers improved on prednisone, PMR responded to prednisone, now is 5 mg qd. She recently started pool therapy and is pursuing CBD oil and potentially medical canabis for pain relief.     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 Completed pulmonary rehab in 2019 where she was able to exercise without oxygen. GFR  improved since last visit.    On HCQ since 4/2019 with significant improvement of joint pain. No retinal toxicity on eye exam done 1/2021. Tolerates HCQ well.     Sister Betty recovered from flare of presumed gout (or pseudogout given previous nl SUA) over L foot. She responded to high dose prednisone (40-30-20-10 mg every day each for 3 days) in the past, now is on 5 mg every day.  Given her DM, osteopenia, highly recommend to start using anakinra prn for gout flares.     Today reported no flares since May 2021. She is not interested in daily urate-lowering therapy and her uric acid has been ~6 so allopurinol deferred.           Plan:    Prednisone 5 mg a day    Norco as needed for pain  (she rarely takes it)    Cevimeline for dry mouth could be taken 3 times a day (she takes it every other day as does not like to take so many pills)    Anakinra 100 mg a day x 3 days as needed for gout flares    Follow up with Dr. See for R hip OA    Stay on fosamax weekly    Continue  mg bid    Yearly eye exam on HCQ, due 1/2022    Labs in future    Return in 3-4 months (in person)        Orders Placed This Encounter   Procedures     Uric acid     ALT     Albumin level     AST     Creatinine     Erythrocyte sedimentation rate auto     CRP inflammation       I saw and examined the patient with Dr. Lopez. I acted as scribe for Dr. Lopez.    Taco Jurado, MS4    Attending Note: I saw and examined the patient with medical student Taco. This note was written by her who acted as scribe for me. I agree with findings and recommendations written in this note. The note reflects decisions made by me.    Zulma Lopez MD         HPI:   Betty Tee is a 81 year old WF with a history of primary Sjogren's syndrome, PMR, OA who presents for follow-up. She was diagnosed with Sjogren's syndrome in 2015 with borderline +RG, +SSA, and lip biopsy focus score of 1. Associated ILD and joint pain are prednisone-responsive  which support the diagnosis.     Today 10/1/2021: No gout flares since last visit so has not needed anakinra. She continues to have R hip pain related to severe OA but does not want to pursue surgery. She recently started pool therapy and began taking CBD oil yesterday. Is hoping to pursue medical marijuana in the future as she does not want to be dependent on norco. Aside from hip pain has otherwise been doing well.        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 She is on prednisone 5 mg a day, she was given prednisone taper recently for possible L foot gout flare which helped. Did not flare again to need using anakinra shots. She has severe R hip pain. Saw ortho, had R hip MRI on 9/5/2020 which showed severe OA, R hip arthroplasty was recommended. She would prefer to delay R hip arthroplasty, had R hip inj on 2/24, NSAIDs are not allowed with CKD. Norco helps but she does not like to be dependent on it, takes it rarely. No L foot pain today. Her hip/leg pain/back pain is getting better, going to PT, doing exercises at home, last time elbow massage caused pain. Epidural shot helped. Has dry mouth. SOB is stable at baseline.  Last 3 wk has been hard, her doctor took her off gabapentin, sweat/chills and loss of appetite. Reason was being on other meds. Now off gabapentin. Had several gushing bowel explosion, could not make it to the bathroom. Random, unpredictable. No triggers. Has had this problem for years.         Review of Systems:   A comprehensive ROS was done. Positives are per HPI.      Active Medications:     Outpatient Medications Prior to Visit   Medication Sig Dispense Refill     ACCU-CHEK SHANNON PLUS test strip USE TO TEST BLOOD SUGARS 3 TIMES DAILY 300 strip 5     acetaminophen (TYLENOL) 500 MG tablet Take 1,000 mg by mouth every 8 hours as needed (max 6 tablets/24 hours, 2 tablets/dose)        acyclovir (ZOVIRAX) 400 MG tablet Take 1 tablet (400 mg) by mouth 3  times daily For a couple days 15 tablet 2     acyclovir (ZOVIRAX) 5 % external ointment Apply topically 6 times daily As needed for outbreaks 15 g 3     albuterol (PROAIR HFA, PROVENTIL HFA, VENTOLIN HFA) 108 (90 BASE) MCG/ACT inhaler Inhale 2 puffs into the lungs every 6 hours 1 Inhaler 3     alendronate (FOSAMAX) 70 MG tablet Take 1 tablet (70 mg) by mouth every 7 days 12 tablet 2     anakinra (KINERET) 100 MG/0.67ML SOSY injection 100 mg every day x 3 days as needed for flare ups 6.7 mL 3     atorvastatin (LIPITOR) 10 MG tablet TAKE 1/2 TABLET BY MOUTH EVERY DAY 45 tablet 3     azithromycin (ZITHROMAX) 250 MG tablet Take 1 tablet (250 mg) by mouth daily 30 tablet 11     BD PEN NEEDLE JANE 2ND GEN 32G X 4 MM miscellaneous USE 4 DAILY AS DIRECTED. 400 each 0     blood glucose (NO BRAND SPECIFIED) lancets standard Use to test blood sugar 3 times daily or as directed 100 lancet 3     cephALEXin (KEFLEX) 250 MG capsule Take 1 capsule (250 mg) by mouth every 6 hours 20 capsule 0     cevimeline (EVOXAC) 30 MG capsule Take 1 capsule (30 mg) by mouth 3 times daily 270 capsule 2     Cholecalciferol (VITAMIN D3) 50 MCG (2000 UT) CAPS Take 1 tablet (50 mcg) by mouth daily - Oral 90 capsule 2     COMPOUNDED NON-CONTROLLED SUBSTANCE (CMPD RX) - PHARMACY TO MIX COMPOUNDED MEDICATION Estriol 1 mg/g Apply small amount to finger and apply to inside vagina daily for 2 weeks then twice weekly Route: vaginally Dispense 30 grams 11 refills 30 g 11     escitalopram (LEXAPRO) 20 MG tablet TAKE 1 TABLET BY MOUTH EVERY DAY 90 tablet 0     famotidine (PEPCID) 10 MG tablet Take 10 mg by mouth 2 times daily       fluticasone (FLONASE) 50 MCG/ACT nasal spray Spray 1-2 sprays into both nostrils daily as needed for allergies 18.2 mL 11     fluticasone-vilanterol (BREO ELLIPTA) 100-25 MCG/INH inhaler Inhale 1 puff into the lungs daily 1 Inhaler 11     HYDROcodone-acetaminophen (NORCO) 7.5-325 MG per tablet Take 1 tablet by mouth every 12 hours  Ok to dispense and start 08/12/21 60 tablet 0     hydroxychloroquine (PLAQUENIL) 200 MG tablet Take 2 tablets (400 mg) by mouth daily Annual Plaquenil toxicity eye screening required. 180 tablet 1     insulin glargine (BASAGLAR KWIKPEN) 100 UNIT/ML pen INJECT 22 UNITS SUBCUTANEOUS DAILY (TO REPLACE LANTUS) 15 mL 1     KLOR-CON 20 MEQ CR tablet TAKE 2 TABLETS (40 MEQ) BY MOUTH EVERY MORNING AND 1 TABLET (20 MEQ) EVERY EVENING. 270 tablet 3     lidocaine (LIDODERM) 5 % patch APPLY PATCH TO PAINFUL AREA FOR UP TO 12 H WITHIN A 24 H PERIOD. REMOVE AFTER 12 HOURS. 30 patch 2     loratadine (CLARITIN) 10 MG tablet TAKE 1 TABLET BY MOUTH EVERY DAY 90 tablet 0     methocarbamol (ROBAXIN) 750 MG tablet Take 1 tablet (750 mg) by mouth 3 times daily as needed for muscle spasms 90 tablet 3     metoprolol succinate ER (TOPROL-XL) 25 MG 24 hr tablet Take 0.5 tablets (12.5 mg) by mouth daily TAKE 1/2 TABLET BY MOUTH 2 TIMES DAILY WITH 50 MG FOR A TOTAL OF 62.5 TWICE A DAY 45 tablet 3     metoprolol succinate ER (TOPROL-XL) 50 MG 24 hr tablet Take 1 tablet (50 mg) by mouth daily (in combination with 12.5mg for a total of 62.5mg twice a day)       montelukast (SINGULAIR) 10 MG tablet TAKE 1 TABLET BY MOUTH EVERYDAY AT BEDTIME 90 tablet 0     NEW MED Estriol 1 mg/g  Apply small amount to finger and apply to inside vagina daily for 2 weeks then twice weekly  Route: vaginally  Dispense 30 grams   4 refills 30 g 4     NOVOLOG FLEXPEN 100 UNIT/ML soln INJECT 12 UNITS SUBCUTANEOUS 3 TIMES DAILY (WITH MEALS) 15 mL 2     predniSONE (DELTASONE) 5 MG tablet Take 1 tablet (5 mg) by mouth daily 30 tablet 0     rivaroxaban ANTICOAGULANT (XARELTO ANTICOAGULANT) 20 MG TABS tablet TAKE 1 TABLET BY MOUTH DAILY WITH DINNER 90 tablet 3     Semaglutide, 1 MG/DOSE, (OZEMPIC, 1 MG/DOSE,) 4 MG/3ML SOPN INJECT 1 MG SUBCUTANEOUS EVERY 7 DAYS 3 mL 0     spironolactone (ALDACTONE) 25 MG tablet Take 2 tablets (50 mg) by mouth daily 180 tablet 3     torsemide  (DEMADEX) 10 MG tablet Take one tab (10 mg) with one 20 mg tab to = 30 mg daily in afternoon. 90 tablet 0     torsemide (DEMADEX) 20 MG tablet Take 2 tablets (40 mg) by mouth every morning AND 1 tablet (20 mg) daily at 2 pm. Take the afternoon dose with an extra 10 mg tab for a total of 30 mg in the afternoon 270 tablet 0     traZODone (DESYREL) 50 MG tablet TAKE 0.5-1.5 TABLETS (25-75 MG) BY MOUTH NIGHTLY AS NEEDED FOR SLEEP 135 tablet 0     No facility-administered medications prior to visit.     Allergies:   Augmentin\  Codeine  Phenobarbital  Seasonal allergies      Past Medical History:  Alcohol abuse, in remission.   Allergic rhinitis.   Antiplatelet long-term use.   Atrial fibrillation.   Cardiomegaly.   Osteopenia.   Diverticulosis.   Benign essential hypertension.   GERD.   Insomnia.   Irregular heart beat.   Lumbago.   Major depressive disorder, recurrent episode, moderate.   ANGELICA.  Osteoarthrosis.   Sjogren's syndrome.   Sleep apnea.   Tobacco use disorder.   TMJ disorder.   RLS.  Major depressive disorder.   Hypertension.   Sciatica.   Colouterine fistula.   Left ventricular hypertrophy.   Breat fibroadenoma.   DVT.   Fatty liver disease, nonalcoholic.   Rib pain.   Neck mass.   Interstitial lung disease.   Acute and chronic respiratory failure with hypoxia.   Type II diabetes mellitus.   Hyperlipidemia.   Inflammatory arthritis.      Past Surgical History:  Back surgery.   Left breast biopsy.   Appendectomy.   Exploratory laparotomy.   Cardiac surgery.   Cholecystectomy.   Left colectomy.   Total abdominal hysterectomy with bilateral salpingo-oophorectomy.   Insert ureter stent.   Flexible sigmoidoscopy.   Ileostomy takedown.     Family History:   Mother: Positive for CAD, heart disease, hypertension, cerebrovascular disease, hyperlipidemia.   Father: Positive for alcohol/drug abuse, Alzheimer disease, dementia, hypertension, hyperlipidemia.   Sister: Positive for CAD, hypertension, and colorectal  cancer.   Sister: Positive for hypertension and hyperlipidemia.   Sister: Positive for diabetes, lung cancer, asthma, and heart disease.   Brother: Positive for Parkinsonism and substance abuse.   Brother: Positive for hypertension, diverticulitis, and prostate cancer.   Daughter: Positive for breast cancer.        Social History:   She is a former smoker; quit in 2011. She has a history of alcohol use but stopped drinking in 1986.      Physical Exam:   Constitutional: NAD, very pleasant, sister Nghia present to help  Eyes: Normal EOM, conjunctiva, sclera  MSK: no synovitis  Skin: No alopecia, rash  Neuro: grossly non-focal  Psych: Normal affect.    Images:  CT Chest w/o contrast (7/25/18):  Findings remain most consistent with small airway disease  and/or nonclassifiable interstitial lung disease and are not  significantly changed from the March 2017 comparison.    Per radiology.    Laboratory:   RHEUM RESULTS Latest Ref Rng & Units 9/24/2004 10/3/2005 10/28/2005   ALBUMIN 3.4 - 5.0 g/dL - 4.0 -   ALT 0 - 50 U/L - 21 -   AST 0 - 45 U/L - 26 -   ANCA - - - -   COMPLEMENT C3 81 - 157 mg/dL - - -   COMPLEMENT C4 13 - 39 mg/dL - - -   CK TOTAL 30 - 225 U/L - - -   CREATININE 0.52 - 1.04 mg/dL 0.80 0.90 0.90   CRP 0.0 - 8.0 mg/L - - -   FRANCISCO <1.0 - - -   GFR ESTIMATE, IF BLACK >60 mL/min/[1.73:m2] >80 >80 >80   GFR ESTIMATE >60 mL/min/1.73m2 77 67 67   HEMATOCRIT 35.0 - 47.0 % 44.8 45.5 46.9   HEMOGLOBIN 11.7 - 15.7 g/dL 15.7 14.8 15.0   IGA 84 - 499 mg/dL - - -   IGM 35 - 242 mg/dL - - -    - 1,616 mg/dL - - -   WBC 4.0 - 11.0 10e9/L 7.2 8.6 7.4   RBC 3.8 - 5.2 10e12/L 4.87 4.90 4.99   RDW 10.0 - 15.0 % 13.7 14.4 14.1   MCHC 31.5 - 36.5 g/dL 35.0 32.5 32.0   MCV 78 - 100 fl 92 93 94   PLATELET COUNT 150 - 450 10e9/L 207 233 236   RHEUMATOID FACTOR <12 IU/mL - - -   ESR 0 - 30 mm/h - - -       Rheumatoid Factor   Date Value Ref Range Status   07/24/2015 <20 <20 IU/mL Final   ,  ,   Cyclic Cit Pept IgG/IgA   Date  Value Ref Range Status   07/24/2015 <20  Interpretation:  Negative   <20 UNITS Final   ,  ,   Scleroderma Antibody Scl-70 CECILIA IgG   Date Value Ref Range Status   07/24/2015  0.0 - 0.9 AI Final    <0.2  Negative   Antibody index (AI) values reflect qualitative changes in antibody   concentration that cannot be directly associated with clinical condition or   disease state.       SSA (Ro) (CECILIA) Antibody, IgG   Date Value Ref Range Status   07/24/2015 1.6 (H) 0.0 - 0.9 AI Final     Comment:     Positive   Antibody index (AI) values reflect qualitative changes in antibody   concentration that cannot be directly associated with clinical condition or   disease state.       SSB (La) (CECILIA) Antibody, IgG   Date Value Ref Range Status   07/24/2015  0.0 - 0.9 AI Final    <0.2  Negative   Antibody index (AI) values reflect qualitative changes in antibody   concentration that cannot be directly associated with clinical condition or   disease state.       ,  ,   RG Screen by EIA   Date Value Ref Range Status   07/24/2015 <1.0  Interpretation:  Negative   <1.0 Final   ,  ,  ,  ,  ,  ,  ,  ,  ,  ,  ,  ,  ,  ,  ,  ,  ,  ,   Neutrophil Cytoplasmic IgG Antibody   Date Value Ref Range Status   07/24/2015   Final    <1:20  Reference range: <1:20  (Note)  The ANCA IFA is <1:20; therefore, no further testing will  be performed.  INTERPRETIVE INFORMATION: Anti-Neutrophil Cyto Ab, IgG  Neutrophil Cytoplasmic Antibodies (C-ANCA = granular  cytoplasmic staining, P-ANCA = perinuclear staining) are  found in the serum of over 90 percent of patients with  certain necrotizing systemic vasculitides, and usually in  less than 5 percent of patients with collagen vascular  disease or arthritis.  Performed by [facility],  [address] [place] [id number] [phone]  [email or web address] Mckeon MD, Lab. Director       ,  ,  ,  ,  ,  ,  ,   IGG   Date Value Ref Range Status   07/24/2015 1320 695 - 1620 mg/dL Final   ,  ,  ,  ,  ,    Scleroderma Antibody Scl-70 CECILIA IgG   Date Value Ref Range Status   07/24/2015  0.0 - 0.9 AI Final    <0.2  Negative   Antibody index (AI) values reflect qualitative changes in antibody   concentration that cannot be directly associated with clinical condition or   disease state.          CBC RESULTS: 6/4/2021   Lab Test 06/04/21  1422   WBC 8.6   RBC 4.46   HGB 13.4   HCT 42.1   MCV 94   MCH 30.0   MCHC 31.8   RDW 15.5*        Last Comprehensive Metabolic Panel:  Sodium   Date Value Ref Range Status   09/09/2021 140 133 - 144 mmol/L Final   06/04/2021 137 133 - 144 mmol/L Final     Potassium   Date Value Ref Range Status   09/09/2021 4.0 3.4 - 5.3 mmol/L Final   06/04/2021 4.0 3.4 - 5.3 mmol/L Final     Chloride   Date Value Ref Range Status   09/09/2021 105 94 - 109 mmol/L Final   06/04/2021 106 94 - 109 mmol/L Final     Carbon Dioxide   Date Value Ref Range Status   06/04/2021 25 20 - 32 mmol/L Final     Carbon Dioxide (CO2)   Date Value Ref Range Status   09/09/2021 25 20 - 32 mmol/L Final     Anion Gap   Date Value Ref Range Status   09/09/2021 10 3 - 14 mmol/L Final   06/04/2021 6 3 - 14 mmol/L Final     Glucose   Date Value Ref Range Status   09/09/2021 189 (H) 70 - 99 mg/dL Final   06/04/2021 142 (H) 70 - 99 mg/dL Final     Urea Nitrogen   Date Value Ref Range Status   09/09/2021 17 7 - 30 mg/dL Final   06/04/2021 14 7 - 30 mg/dL Final     Creatinine   Date Value Ref Range Status   09/09/2021 1.07 (H) 0.52 - 1.04 mg/dL Final   06/04/2021 1.11 (H) 0.52 - 1.04 mg/dL Final     GFR Estimate   Date Value Ref Range Status   09/09/2021 49 (L) >60 mL/min/1.73m2 Final     Comment:     As of July 11, 2021, eGFR is calculated by the CKD-EPI creatinine equation, without race adjustment. eGFR can be influenced by muscle mass, exercise, and diet. The reported eGFR is an estimation only and is only applicable if the renal function is stable.   06/04/2021 47 (L) >60 mL/min/[1.73_m2] Final     Comment:     Non   GFR Calc  Starting 12/18/2018, serum creatinine based estimated GFR (eGFR) will be   calculated using the Chronic Kidney Disease Epidemiology Collaboration   (CKD-EPI) equation.       Calcium   Date Value Ref Range Status   09/09/2021 9.0 8.5 - 10.1 mg/dL Final   06/04/2021 8.8 8.5 - 10.1 mg/dL Final       ESR 6/4/2021: 10.0    CRP 6/4/2021: 3.0    Uric acid 6/4/2021: 6.2

## 2021-10-01 NOTE — LETTER
10/1/2021       RE: Betty Tee  [address]  [facility] [address]     Dear Colleague,    Thank you for referring your patient, Betty Tee, to the [facility] RHEUMATOLOGY [facility]INIC [place] at [facility]. Please see a copy of my visit note below.    Sister Betty is a 81 year old who is being evaluated via a billable video visit.      How would you like to obtain your AVS? MyChart  If the video visit is dropped, the invitation should be resent by: Send to e-mail at: [email or web address]  Will anyone else be joining your video visit? No      Video Start Time: 2:08 PM  Video-Visit Details    Type of service:  Video Visit    Video End Time:2:25 PM    Originating Location (pt. Location): Home    Distant Location (provider location):  [facility] RHEUMATOLOGY CLINIC [place]     Platform used for Video Visit: [facility]       Rheumatology Clinic Virtual Visit Note  Zulma Lopez MD  DOS: 10/1/2021  Date of last visit: 2021    Name: Betty Tee  MRN: [id number]  Age: 81 year old  : 1940  Reason for visit: Follow up visit for R hip pain sec severe OA, PMR, presumed gout flare of L foot, primary Sjogren's syndrome    # Sjogren's syndrome since  with borderline +RG, +SSA, and lip biopsy focus score of 1. Ongoing dry mouth on evoxac qod  # Follicular bronchiolitis, prior mild ILD   # Osteopenia on DEXA 2019 with T score=-2.1 with decline in bone density on fosamax  # Chronic prednisone use  # DM  # A fib  # Severe R hip OA with upcoming local steroid inj on norco prn  # PMR stable on prednisone 5 mg every day  #Presumed gout flare in L foot started today      Assessment and Plan:    New Dx of PMR. Severe R hip pain is due to severe OA based on 2020 hip MRI. She prefers to avoid hip arthroplasty. Her inflammation markers improved on prednisone, PMR responded to prednisone, now is 5 mg qd. She recently started pool  therapy and is pursuing CBD oil and potentially medical canabis for pain relief.     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 Completed pulmonary rehab in 2/2019 where she was able to exercise without oxygen. GFR improved since last visit.    On HCQ since 4/2019 with significant improvement of joint pain. No retinal toxicity on eye exam done 1/2021. Tolerates HCQ well.     Sister Betty recovered from flare of presumed gout (or pseudogout given previous nl SUA) over L foot. She responded to high dose prednisone (40-30-20-10 mg every day each for 3 days) in the past, now is on 5 mg every day.  Given her DM, osteopenia, highly recommend to start using anakinra prn for gout flares.     Today reported no flares since May 2021. She is not interested in daily urate-lowering therapy and her uric acid has been ~6 so allopurinol deferred.           Plan:    Prednisone 5 mg a day    Norco as needed for pain  (she rarely takes it)    Cevimeline for dry mouth could be taken 3 times a day (she takes it every other day as does not like to take so many pills)    Anakinra 100 mg a day x 3 days as needed for gout flares    Follow up with Dr. See for R hip OA    Stay on fosamax weekly    Continue  mg bid    Yearly eye exam on HCQ, due 1/2022    Labs in future    Return in 3-4 months (in person)        Orders Placed This Encounter   Procedures     Uric acid     ALT     Albumin level     AST     Creatinine     Erythrocyte sedimentation rate auto     CRP inflammation       I saw and examined the patient with Dr. Lopez. I acted as scribe for Dr. Lopez.    Taco Jurado, MS4    Attending Note: I saw and examined the patient with medical student Taco. This note was written by her who acted as scribe for me. I agree with findings and recommendations written in this note. The note reflects decisions made by  me.    Zulma Lopez MD         HPI:   Betty Tee is a 81 year old WF with a history of primary Sjogren's syndrome, PMR, OA who presents for follow-up. She was diagnosed with Sjogren's syndrome in 2015 with borderline +RG, +SSA, and lip biopsy focus score of 1. Associated ILD and joint pain are prednisone-responsive which support the diagnosis.     Today 10/1/2021: No gout flares since last visit so has not needed anakinra. She continues to have R hip pain related to severe OA but does not want to pursue surgery. She recently started pool therapy and began taking CBD oil yesterday. Is hoping to pursue medical marijuana in the future as she does not want to be dependent on norco. Aside from hip pain has otherwise been doing well.        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 She is on prednisone 5 mg a day, she was given prednisone taper recently for possible L foot gout flare which helped. Did not flare again to need using anakinra shots. She has severe R hip pain. Saw ortho, had R hip MRI on 9/5/2020 which showed severe OA, R hip arthroplasty was recommended. She would prefer to delay R hip arthroplasty, had R hip inj on 2/24, NSAIDs are not allowed with CKD. Norco helps but she does not like to be dependent on it, takes it rarely. No L foot pain today. Her hip/leg pain/back pain is getting better, going to PT, doing exercises at home, last time elbow massage caused pain. Epidural shot helped. Has dry mouth. SOB is stable at baseline.  Last 3 wk has been hard, her doctor took her off gabapentin, sweat/chills and loss of appetite. Reason was being on other meds. Now off gabapentin. Had several gushing bowel explosion, could not make it to the bathroom. Random, unpredictable. No triggers. Has had this problem for years.         Review of Systems:   A comprehensive ROS was done. Positives are per HPI.      Active Medications:     Outpatient Medications Prior to  Visit   Medication Sig Dispense Refill     ACCU-CHEK SHANNON PLUS test strip USE TO TEST BLOOD SUGARS 3 TIMES DAILY 300 strip 5     acetaminophen (TYLENOL) 500 MG tablet Take 1,000 mg by mouth every 8 hours as needed (max 6 tablets/24 hours, 2 tablets/dose)        acyclovir (ZOVIRAX) 400 MG tablet Take 1 tablet (400 mg) by mouth 3 times daily For a couple days 15 tablet 2     acyclovir (ZOVIRAX) 5 % external ointment Apply topically 6 times daily As needed for outbreaks 15 g 3     albuterol (PROAIR HFA, PROVENTIL HFA, VENTOLIN HFA) 108 (90 BASE) MCG/ACT inhaler Inhale 2 puffs into the lungs every 6 hours 1 Inhaler 3     alendronate (FOSAMAX) 70 MG tablet Take 1 tablet (70 mg) by mouth every 7 days 12 tablet 2     anakinra (KINERET) 100 MG/0.67ML SOSY injection 100 mg every day x 3 days as needed for flare ups 6.7 mL 3     atorvastatin (LIPITOR) 10 MG tablet TAKE 1/2 TABLET BY MOUTH EVERY DAY 45 tablet 3     azithromycin (ZITHROMAX) 250 MG tablet Take 1 tablet (250 mg) by mouth daily 30 tablet 11     BD PEN NEEDLE JANE 2ND GEN 32G X 4 MM miscellaneous USE 4 DAILY AS DIRECTED. 400 each 0     blood glucose (NO BRAND SPECIFIED) lancets standard Use to test blood sugar 3 times daily or as directed 100 lancet 3     cephALEXin (KEFLEX) 250 MG capsule Take 1 capsule (250 mg) by mouth every 6 hours 20 capsule 0     cevimeline (EVOXAC) 30 MG capsule Take 1 capsule (30 mg) by mouth 3 times daily 270 capsule 2     Cholecalciferol (VITAMIN D3) 50 MCG (2000 UT) CAPS Take 1 tablet (50 mcg) by mouth daily - Oral 90 capsule 2     COMPOUNDED NON-CONTROLLED SUBSTANCE (CMPD RX) - PHARMACY TO MIX COMPOUNDED MEDICATION Estriol 1 mg/g Apply small amount to finger and apply to inside vagina daily for 2 weeks then twice weekly Route: vaginally Dispense 30 grams 11 refills 30 g 11     escitalopram (LEXAPRO) 20 MG tablet TAKE 1 TABLET BY MOUTH EVERY DAY 90 tablet 0     famotidine (PEPCID) 10 MG tablet Take 10 mg by mouth 2 times daily        fluticasone (FLONASE) 50 MCG/ACT nasal spray Spray 1-2 sprays into both nostrils daily as needed for allergies 18.2 mL 11     fluticasone-vilanterol (BREO ELLIPTA) 100-25 MCG/INH inhaler Inhale 1 puff into the lungs daily 1 Inhaler 11     HYDROcodone-acetaminophen (NORCO) 7.5-325 MG per tablet Take 1 tablet by mouth every 12 hours Ok to dispense and start 08/12/21 60 tablet 0     hydroxychloroquine (PLAQUENIL) 200 MG tablet Take 2 tablets (400 mg) by mouth daily Annual Plaquenil toxicity eye screening required. 180 tablet 1     insulin glargine (BASAGLAR KWIKPEN) 100 UNIT/ML pen INJECT 22 UNITS SUBCUTANEOUS DAILY (TO REPLACE LANTUS) 15 mL 1     KLOR-CON 20 MEQ CR tablet TAKE 2 TABLETS (40 MEQ) BY MOUTH EVERY MORNING AND 1 TABLET (20 MEQ) EVERY EVENING. 270 tablet 3     lidocaine (LIDODERM) 5 % patch APPLY PATCH TO PAINFUL AREA FOR UP TO 12 H WITHIN A 24 H PERIOD. REMOVE AFTER 12 HOURS. 30 patch 2     loratadine (CLARITIN) 10 MG tablet TAKE 1 TABLET BY MOUTH EVERY DAY 90 tablet 0     methocarbamol (ROBAXIN) 750 MG tablet Take 1 tablet (750 mg) by mouth 3 times daily as needed for muscle spasms 90 tablet 3     metoprolol succinate ER (TOPROL-XL) 25 MG 24 hr tablet Take 0.5 tablets (12.5 mg) by mouth daily TAKE 1/2 TABLET BY MOUTH 2 TIMES DAILY WITH 50 MG FOR A TOTAL OF 62.5 TWICE A DAY 45 tablet 3     metoprolol succinate ER (TOPROL-XL) 50 MG 24 hr tablet Take 1 tablet (50 mg) by mouth daily (in combination with 12.5mg for a total of 62.5mg twice a day)       montelukast (SINGULAIR) 10 MG tablet TAKE 1 TABLET BY MOUTH EVERYDAY AT BEDTIME 90 tablet 0     NEW MED Estriol 1 mg/g  Apply small amount to finger and apply to inside vagina daily for 2 weeks then twice weekly  Route: vaginally  Dispense 30 grams   4 refills 30 g 4     NOVOLOG FLEXPEN 100 UNIT/ML soln INJECT 12 UNITS SUBCUTANEOUS 3 TIMES DAILY (WITH MEALS) 15 mL 2     predniSONE (DELTASONE) 5 MG tablet Take 1 tablet (5 mg) by mouth daily 30 tablet 0      rivaroxaban ANTICOAGULANT (XARELTO ANTICOAGULANT) 20 MG TABS tablet TAKE 1 TABLET BY MOUTH DAILY WITH DINNER 90 tablet 3     Semaglutide, 1 MG/DOSE, (OZEMPIC, 1 MG/DOSE,) 4 MG/3ML SOPN INJECT 1 MG SUBCUTANEOUS EVERY 7 DAYS 3 mL 0     spironolactone (ALDACTONE) 25 MG tablet Take 2 tablets (50 mg) by mouth daily 180 tablet 3     torsemide (DEMADEX) 10 MG tablet Take one tab (10 mg) with one 20 mg tab to = 30 mg daily in afternoon. 90 tablet 0     torsemide (DEMADEX) 20 MG tablet Take 2 tablets (40 mg) by mouth every morning AND 1 tablet (20 mg) daily at 2 pm. Take the afternoon dose with an extra 10 mg tab for a total of 30 mg in the afternoon 270 tablet 0     traZODone (DESYREL) 50 MG tablet TAKE 0.5-1.5 TABLETS (25-75 MG) BY MOUTH NIGHTLY AS NEEDED FOR SLEEP 135 tablet 0     No facility-administered medications prior to visit.     Allergies:   Augmentin\  Codeine  Phenobarbital  Seasonal allergies      Past Medical History:  Alcohol abuse, in remission.   Allergic rhinitis.   Antiplatelet long-term use.   Atrial fibrillation.   Cardiomegaly.   Osteopenia.   Diverticulosis.   Benign essential hypertension.   GERD.   Insomnia.   Irregular heart beat.   Lumbago.   Major depressive disorder, recurrent episode, moderate.   ANGELICA.  Osteoarthrosis.   Sjogren's syndrome.   Sleep apnea.   Tobacco use disorder.   TMJ disorder.   RLS.  Major depressive disorder.   Hypertension.   Sciatica.   Colouterine fistula.   Left ventricular hypertrophy.   Breat fibroadenoma.   DVT.   Fatty liver disease, nonalcoholic.   Rib pain.   Neck mass.   Interstitial lung disease.   Acute and chronic respiratory failure with hypoxia.   Type II diabetes mellitus.   Hyperlipidemia.   Inflammatory arthritis.      Past Surgical History:  Back surgery.   Left breast biopsy.   Appendectomy.   Exploratory laparotomy.   Cardiac surgery.   Cholecystectomy.   Left colectomy.   Total abdominal hysterectomy with bilateral salpingo-oophorectomy.   Insert ureter  stent.   Flexible sigmoidoscopy.   Ileostomy takedown.     Family History:   Mother: Positive for CAD, heart disease, hypertension, cerebrovascular disease, hyperlipidemia.   Father: Positive for alcohol/drug abuse, Alzheimer disease, dementia, hypertension, hyperlipidemia.   Sister: Positive for CAD, hypertension, and colorectal cancer.   Sister: Positive for hypertension and hyperlipidemia.   Sister: Positive for diabetes, lung cancer, asthma, and heart disease.   Brother: Positive for Parkinsonism and substance abuse.   Brother: Positive for hypertension, diverticulitis, and prostate cancer.   Daughter: Positive for breast cancer.        Social History:   She is a former smoker; quit in 2011. She has a history of alcohol use but stopped drinking in 1986.      Physical Exam:   Constitutional: NAD, very pleasant, sister Nghia present to help  Eyes: Normal EOM, conjunctiva, sclera  MSK: no synovitis  Skin: No alopecia, rash  Neuro: grossly non-focal  Psych: Normal affect.    Images:  CT Chest w/o contrast (7/25/18):  Findings remain most consistent with small airway disease  and/or nonclassifiable interstitial lung disease and are not  significantly changed from the March 2017 comparison.    Per radiology.    Laboratory:   RHEUM RESULTS Latest Ref Rng & Units 9/24/2004 10/3/2005 10/28/2005   ALBUMIN 3.4 - 5.0 g/dL - 4.0 -   ALT 0 - 50 U/L - 21 -   AST 0 - 45 U/L - 26 -   ANCA - - - -   COMPLEMENT C3 81 - 157 mg/dL - - -   COMPLEMENT C4 13 - 39 mg/dL - - -   CK TOTAL 30 - 225 U/L - - -   CREATININE 0.52 - 1.04 mg/dL 0.80 0.90 0.90   CRP 0.0 - 8.0 mg/L - - -   FRANCISCO <1.0 - - -   GFR ESTIMATE, IF BLACK >60 mL/min/[1.73:m2] >80 >80 >80   GFR ESTIMATE >60 mL/min/1.73m2 77 67 67   HEMATOCRIT 35.0 - 47.0 % 44.8 45.5 46.9   HEMOGLOBIN 11.7 - 15.7 g/dL 15.7 14.8 15.0   IGA 84 - 499 mg/dL - - -   IGM 35 - 242 mg/dL - - -    - 1,616 mg/dL - - -   WBC 4.0 - 11.0 10e9/L 7.2 8.6 7.4   RBC 3.8 - 5.2 10e12/L 4.87 4.90 4.99    RDW 10.0 - 15.0 % 13.7 14.4 14.1   MCHC 31.5 - 36.5 g/dL 35.0 32.5 32.0   MCV 78 - 100 fl 92 93 94   PLATELET COUNT 150 - 450 10e9/L 207 233 236   RHEUMATOID FACTOR <12 IU/mL - - -   ESR 0 - 30 mm/h - - -       Rheumatoid Factor   Date Value Ref Range Status   07/24/2015 <20 <20 IU/mL Final   ,  ,   Cyclic Cit Pept IgG/IgA   Date Value Ref Range Status   07/24/2015 <20  Interpretation:  Negative   <20 UNITS Final   ,  ,   Scleroderma Antibody Scl-70 CECILIA IgG   Date Value Ref Range Status   07/24/2015  0.0 - 0.9 AI Final    <0.2  Negative   Antibody index (AI) values reflect qualitative changes in antibody   concentration that cannot be directly associated with clinical condition or   disease state.       SSA (Ro) (CECILIA) Antibody, IgG   Date Value Ref Range Status   07/24/2015 1.6 (H) 0.0 - 0.9 AI Final     Comment:     Positive   Antibody index (AI) values reflect qualitative changes in antibody   concentration that cannot be directly associated with clinical condition or   disease state.       SSB (La) (CECILIA) Antibody, IgG   Date Value Ref Range Status   07/24/2015  0.0 - 0.9 AI Final    <0.2  Negative   Antibody index (AI) values reflect qualitative changes in antibody   concentration that cannot be directly associated with clinical condition or   disease state.       ,  ,   RG Screen by EIA   Date Value Ref Range Status   07/24/2015 <1.0  Interpretation:  Negative   <1.0 Final   ,  ,  ,  ,  ,  ,  ,  ,  ,  ,  ,  ,  ,  ,  ,  ,  ,  ,   Neutrophil Cytoplasmic IgG Antibody   Date Value Ref Range Status   07/24/2015   Final    <1:20  Reference range: <1:20  (Note)  The ANCA IFA is <1:20; therefore, no further testing will  be performed.  INTERPRETIVE INFORMATION: Anti-Neutrophil Cyto Ab, IgG  Neutrophil Cytoplasmic Antibodies (C-ANCA = granular  cytoplasmic staining, P-ANCA = perinuclear staining) are  found in the serum of over 90 percent of patients with  certain necrotizing systemic vasculitides, and usually  in  less than 5 percent of patients with collagen vascular  disease or arthritis.  Performed by [employer],  [facility] [place] [address] [phone]  [email or web address], Burke Mckeon MD, Lab. Director       ,  ,  ,  ,  ,  ,  ,   IGG   Date Value Ref Range Status   07/24/2015 1320 695 - 1620 mg/dL Final   ,  ,  ,  ,  ,   Scleroderma Antibody Scl-70 CECILIA IgG   Date Value Ref Range Status   07/24/2015  0.0 - 0.9 AI Final    <0.2  Negative   Antibody index (AI) values reflect qualitative changes in antibody   concentration that cannot be directly associated with clinical condition or   disease state.          CBC RESULTS: 6/4/2021   Lab Test 06/04/21  1422   WBC 8.6   RBC 4.46   HGB 13.4   HCT 42.1   MCV 94   MCH 30.0   MCHC 31.8   RDW 15.5*        Last Comprehensive Metabolic Panel:  Sodium   Date Value Ref Range Status   09/09/2021 140 133 - 144 mmol/L Final   06/04/2021 137 133 - 144 mmol/L Final     Potassium   Date Value Ref Range Status   09/09/2021 4.0 3.4 - 5.3 mmol/L Final   06/04/2021 4.0 3.4 - 5.3 mmol/L Final     Chloride   Date Value Ref Range Status   09/09/2021 105 94 - 109 mmol/L Final   06/04/2021 106 94 - 109 mmol/L Final     Carbon Dioxide   Date Value Ref Range Status   06/04/2021 25 20 - 32 mmol/L Final     Carbon Dioxide (CO2)   Date Value Ref Range Status   09/09/2021 25 20 - 32 mmol/L Final     Anion Gap   Date Value Ref Range Status   09/09/2021 10 3 - 14 mmol/L Final   06/04/2021 6 3 - 14 mmol/L Final     Glucose   Date Value Ref Range Status   09/09/2021 189 (H) 70 - 99 mg/dL Final   06/04/2021 142 (H) 70 - 99 mg/dL Final     Urea Nitrogen   Date Value Ref Range Status   09/09/2021 17 7 - 30 mg/dL Final   06/04/2021 14 7 - 30 mg/dL Final     Creatinine   Date Value Ref Range Status   09/09/2021 1.07 (H) 0.52 - 1.04 mg/dL Final   06/04/2021 1.11 (H) 0.52 - 1.04 mg/dL Final     GFR Estimate   Date Value Ref Range Status   09/09/2021 49 (L) >60 mL/min/1.73m2 Final     Comment:      As of July 11, 2021, eGFR is calculated by the CKD-EPI creatinine equation, without race adjustment. eGFR can be influenced by muscle mass, exercise, and diet. The reported eGFR is an estimation only and is only applicable if the renal function is stable.   06/04/2021 47 (L) >60 mL/min/[1.73_m2] Final     Comment:     Non  GFR Calc  Starting 12/18/2018, serum creatinine based estimated GFR (eGFR) will be   calculated using the Chronic Kidney Disease Epidemiology Collaboration   (CKD-EPI) equation.       Calcium   Date Value Ref Range Status   09/09/2021 9.0 8.5 - 10.1 mg/dL Final   06/04/2021 8.8 8.5 - 10.1 mg/dL Final       ESR 6/4/2021: 10.0    CRP 6/4/2021: 3.0    Uric acid 6/4/2021: 6.2

## 2021-10-03 DIAGNOSIS — I50.43 ACUTE ON CHRONIC COMBINED SYSTOLIC AND DIASTOLIC HEART FAILURE (H): ICD-10-CM

## 2021-10-04 DIAGNOSIS — Z79.4 TYPE 2 DIABETES MELLITUS WITH HYPERGLYCEMIA, WITH LONG-TERM CURRENT USE OF INSULIN (H): ICD-10-CM

## 2021-10-04 DIAGNOSIS — E11.65 TYPE 2 DIABETES MELLITUS WITH HYPERGLYCEMIA, WITH LONG-TERM CURRENT USE OF INSULIN (H): ICD-10-CM

## 2021-10-04 RX ORDER — HYDROCODONE BITARTRATE AND ACETAMINOPHEN 7.5; 325 MG/1; MG/1
1 TABLET ORAL EVERY 12 HOURS
Qty: 60 TABLET | Refills: 0 | Status: SHIPPED | OUTPATIENT
Start: 2021-10-04 | End: 2021-12-02

## 2021-10-04 NOTE — TELEPHONE ENCOUNTER
Routing to provider to review medication prepped per below    HYDROcodone-acetaminophen (NORCO) 7.5-325 MG per tablet  #60, Refill:0  Sig:  Last picked on: 8/12/21  Due Ok to dispense and start 10/4/21    Contract completed by phone 10/4/21    Per last OV note 9/2/21:Medication Management:   Continue Norco 7.5-325mg BID prn.     [id number] - [place], [place] - [facility] [address] [address]  Phone: [phone] Fax: [phone]      Ilene SHERIDAN RN Care Coordinator  [facility]

## 2021-10-05 ENCOUNTER — TELEPHONE (OUTPATIENT)
Dept: PALLIATIVE MEDICINE | Facility: CLINIC | Age: 81
End: 2021-10-05

## 2021-10-05 RX ORDER — TORSEMIDE 10 MG/1
TABLET ORAL
Qty: 90 TABLET | Refills: 3 | Status: SHIPPED | OUTPATIENT
Start: 2021-10-05 | End: 2022-11-05

## 2021-10-05 RX ORDER — SEMAGLUTIDE 1.34 MG/ML
INJECTION, SOLUTION SUBCUTANEOUS
Qty: 3 ML | Refills: 1 | Status: SHIPPED | OUTPATIENT
Start: 2021-10-05 | End: 2021-12-14

## 2021-10-05 NOTE — TELEPHONE ENCOUNTER
"  TORSEMIDE 10 MG TABLET  Take one tab (10 mg) with one 20 mg tab to = 30 mg daily in afternoon.  Last Written Prescription Date:  6/20/2021  Last Fill Quantity: 90,   # refills: 0  Last Office Visit : 9/9/2021 \"Plan for HFpEF: continue current dose of diuretics, BP is controlled, volume status is euvolemic \"  Future Office visit:   CORE in 6 months, Moe in 1 year with labs and echo       09/09/21  1316    CR 1.07*   Results reviewed by Dr Rosa in clinic with patient  Per 9/9/21 note: 9/9/2021 \"Plan for HFpEF: continue current dose of diuretics, BP is controlled, volume status is euvolemic \" per chart review Cr stable and reviewed by multiple providers.        "

## 2021-10-05 NOTE — TELEPHONE ENCOUNTER
[email or web address] message sent with Rx approval from provider.  Belén  Pain Clinic Management Team

## 2021-10-05 NOTE — TELEPHONE ENCOUNTER
CW,    Ozempic:    Routing refill request to provider for review/approval because:  Labs out of range:    Creatinine   Date Value Ref Range Status   09/09/2021 1.07 (H) 0.52 - 1.04 mg/dL Final   06/04/2021 1.11 (H) 0.52 - 1.04 mg/dL Final     Last OV 9/14/21.  Thanks,  Sophia Hemphill, RN

## 2021-10-05 NOTE — TELEPHONE ENCOUNTER
Pt's pool PT, Maribell Ansari from [facility], calling in regards to patient's hip pain. States she has noticed an increase in the pain and is wondering if further intervention or imaging should be done. You can reach Maribell at [phone] if there are anymore questions.    Mya ESQUIVEL    [facility] Pain Management

## 2021-10-06 NOTE — TELEPHONE ENCOUNTER
"Spoke with patient and it is the right hip pain that bothers her. She describes it as \"where the femur meets the hip\".  Pool therapy is helpful. Piriformis and sciatic nerve pain have improved but hip pain remains. She was hoping to get rid of the walker but the right hip gives out and she feels she needs it to prevent falls. She would like to continue pool therapy but also would like Dr Ambrose opinion on what to do now.    NORMA EnglishN, RN  Care Coordinator  [facility] [place]    "

## 2021-10-06 NOTE — TELEPHONE ENCOUNTER
Recommend scheduling an in-person visit for further evaluation.    Charly Moore DO  [facility]

## 2021-10-07 NOTE — TELEPHONE ENCOUNTER
Called pt. Advised in message to make follow up appt.     Claudia LANDON, RN Care Coordinator  [facility]  Pain [facility]

## 2021-10-09 DIAGNOSIS — I50.30 (HFPEF) HEART FAILURE WITH PRESERVED EJECTION FRACTION (H): ICD-10-CM

## 2021-10-13 RX ORDER — TORSEMIDE 20 MG/1
TABLET ORAL
Qty: 270 TABLET | Refills: 0 | Status: SHIPPED | OUTPATIENT
Start: 2021-10-13 | End: 2022-01-12

## 2021-10-13 NOTE — TELEPHONE ENCOUNTER
" TORSEMIDE 20 MG TABLET Take 2 tablets (40 mg) by mouth every morning AND 1 tablet (20 mg) daily at 2 pm. Take the afternoon dose with an extra 10 mg tab for a total of 30 mg in the afternoon   Last Written Prescription Date:  7/9/21  Last Fill Quantity: 270,   # refills: 0   Last Office Visit : 9/9/2021 \"Plan for HFpEF: continue current dose of diuretics, BP is controlled, volume status is euvolemic \"  Future Office visit:   [facility] in 6 months, Moe in 1 year with labs and echo       09/09/21  1316     CR 1.07*   Results reviewed by Dr Rosa in clinic with patient  Per 9/9/21 note: 9/9/2021 \"Plan for HFpEF: continue current dose of diuretics, BP is controlled, volume status is euvolemic \" per chart review Cr stable and reviewed by multiple providers.        "

## 2021-10-17 DIAGNOSIS — M35.02 SJOGREN'S SYNDROME WITH LUNG INVOLVEMENT (H): ICD-10-CM

## 2021-10-18 NOTE — TELEPHONE ENCOUNTER
Chart review: follow up made 11/09/21    Claudia LANDON, RN Care Coordinator  [facility]  Pain [facility]

## 2021-10-20 NOTE — TELEPHONE ENCOUNTER
HYDROXYCHLOROQUINE 200 MG TAB      Last Written Prescription Date:  4-28-21  Last Fill Quantity: 180,   # refills: 1  Last Office Visit : 10-1-21  Future Office visit:  None  Last Eye exam: 1-14-21    Creatinine   Date Value Ref Range Status   09/09/2021 1.07 (H) 0.52 - 1.04 mg/dL Final   06/04/2021 1.11 (H) 0.52 - 1.04 mg/dL Final       Routing refill request to provider for review/approval because:  Abnormal Cr

## 2021-10-21 RX ORDER — HYDROXYCHLOROQUINE SULFATE 200 MG/1
400 TABLET, FILM COATED ORAL DAILY
Qty: 180 TABLET | Refills: 0 | Status: SHIPPED | OUTPATIENT
Start: 2021-10-21 | End: 2022-01-25

## 2021-10-22 ENCOUNTER — OFFICE VISIT (OUTPATIENT)
Dept: PULMONOLOGY | Facility: CLINIC | Age: 81
End: 2021-10-22
Attending: INTERNAL MEDICINE
Payer: MEDICARE

## 2021-10-22 VITALS
SYSTOLIC BLOOD PRESSURE: 122 MMHG | HEIGHT: 67 IN | HEART RATE: 62 BPM | RESPIRATION RATE: 17 BRPM | DIASTOLIC BLOOD PRESSURE: 79 MMHG | BODY MASS INDEX: 30.92 KG/M2 | WEIGHT: 197 LBS | OXYGEN SATURATION: 97 %

## 2021-10-22 DIAGNOSIS — J44.89 FOLLICULAR BRONCHIOLITIS (H): ICD-10-CM

## 2021-10-22 DIAGNOSIS — J44.89 FOLLICULAR BRONCHIOLITIS (H): Primary | ICD-10-CM

## 2021-10-22 PROCEDURE — 99215 OFFICE O/P EST HI 40 MIN: CPT | Mod: 25 | Performed by: INTERNAL MEDICINE

## 2021-10-22 PROCEDURE — 94375 RESPIRATORY FLOW VOLUME LOOP: CPT | Performed by: INTERNAL MEDICINE

## 2021-10-22 PROCEDURE — 94726 PLETHYSMOGRAPHY LUNG VOLUMES: CPT | Performed by: INTERNAL MEDICINE

## 2021-10-22 PROCEDURE — G0463 HOSPITAL OUTPT CLINIC VISIT: HCPCS | Mod: 25

## 2021-10-22 PROCEDURE — 94729 DIFFUSING CAPACITY: CPT | Performed by: INTERNAL MEDICINE

## 2021-10-22 ASSESSMENT — MIFFLIN-ST. JEOR: SCORE: 1391.22

## 2021-10-22 NOTE — PROGRESS NOTES
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  She had mild ILD on a previous chest CT scan in 2015, but follow-up chest CT scan in 2018 showed improvement with no obvious ILD present on the CT scan.  The follicular bronchiolitis changes were still present.  She has been on prednisone for her Sjogren's for approximately 5 years, and that is managed by Dr. Webster in rheumatology. Her medications for follicular bronchiolitis include current prednisone dose is 5 mg daily, azithromycin 250 mg daily, montelukast daily, and Breo Ellipta inhaler 100-25. These are all anti-inflammatories for her bronchiolitis.    Today, she reports that her dyspnea on exertion is unchanged. She finished physical therapy. She does not exercise, and she uses a walker. Her activity is limited by hip pain. She does wear CPAP for sleep apnea at nighttime. She endorses a cough sometimes, and that is stable. She coughs up white to yellow-colored sputum. She denies fevers or new skin rashes. She reports that she sometimes forgets to take her Breo Ellipta inhaler.    She did receive the flu vaccine and the third shot of the Pfizer COVID-19 vaccine.          Current Outpatient Medications   Medication     ACCU-CHEK SHANNON PLUS test strip     acetaminophen (TYLENOL) 500 MG tablet     acyclovir (ZOVIRAX) 400 MG tablet     acyclovir (ZOVIRAX) 5 % external ointment     albuterol (PROAIR HFA, PROVENTIL HFA, VENTOLIN HFA) 108 (90 BASE) MCG/ACT inhaler     alendronate (FOSAMAX) 70 MG tablet     anakinra (KINERET) 100 MG/0.67ML SOSY injection     atorvastatin (LIPITOR) 10 MG tablet     azithromycin (ZITHROMAX) 250 MG tablet     BD PEN NEEDLE JANE 2ND GEN 32G X 4 MM miscellaneous     blood glucose (NO  BRAND SPECIFIED) lancets standard     cephALEXin (KEFLEX) 250 MG capsule     cevimeline (EVOXAC) 30 MG capsule     Cholecalciferol (VITAMIN D3) 50 MCG (2000 UT) CAPS     COMPOUNDED NON-CONTROLLED SUBSTANCE (CMPD RX) - PHARMACY TO MIX COMPOUNDED MEDICATION     escitalopram (LEXAPRO) 20 MG tablet     famotidine (PEPCID) 10 MG tablet     fluticasone (FLONASE) 50 MCG/ACT nasal spray     fluticasone-vilanterol (BREO ELLIPTA) 100-25 MCG/INH inhaler     HYDROcodone-acetaminophen (NORCO) 7.5-325 MG per tablet     hydroxychloroquine (PLAQUENIL) 200 MG tablet     insulin glargine (BASAGLAR KWIKPEN) 100 UNIT/ML pen     KLOR-CON 20 MEQ CR tablet     lidocaine (LIDODERM) 5 % patch     loratadine (CLARITIN) 10 MG tablet     methocarbamol (ROBAXIN) 750 MG tablet     metoprolol succinate ER (TOPROL-XL) 25 MG 24 hr tablet     metoprolol succinate ER (TOPROL-XL) 50 MG 24 hr tablet     montelukast (SINGULAIR) 10 MG tablet     NEW MED     NOVOLOG FLEXPEN 100 UNIT/ML soln     OZEMPIC, 1 MG/DOSE, 4 MG/3ML SOPN     predniSONE (DELTASONE) 5 MG tablet     rivaroxaban ANTICOAGULANT (XARELTO ANTICOAGULANT) 20 MG TABS tablet     spironolactone (ALDACTONE) 25 MG tablet     torsemide (DEMADEX) 10 MG tablet     torsemide (DEMADEX) 20 MG tablet     traZODone (DESYREL) 50 MG tablet     No current facility-administered medications for this visit.     Allergies   Allergen Reactions     Amoxicillin-Pot Clavulanate      Augmentin Nausea and Vomiting     Codeine Nausea and Vomiting     Codeine      PN: LW Reaction: HIVES     Penicillins Nausea and Vomiting     PN: LW Reaction: GI Upset     Phenobarbital Itching     Phenobarbital      Seasonal Allergies      Past Medical History:   Diagnosis Date     Alcohol abuse, in remission      Allergic rhinitis, cause unspecified     allegra helps when she takes it     Antiplatelet or antithrombotic long-term use      Atrial fibrillation (H)     in hosp in 11/11 after surgery w/ fluid overload     Cardiomegaly      LVH on stress echo- cardiac w/u at [facility] ER- neg CT scan for PE, neg stress echo in 8/06     Chest pain, unspecified      Congestive heart failure (H)      Depressive disorder      Diabetes (H)      Disorder of bone and cartilage, unspecified     osteopenia (had been on prempro), improved on 6/06 dexa, stable dexa 11/10     Diverticulosis of colon (without mention of hemorrhage)     last episode yrs ago     Essential hypertension, benign      Follicular bronchiolitis (H)     associated with Sjogrens, dx by chest CT showing mosaic attenuation and air trapping     Gastro-oesophageal reflux disease      ILD (interstitial lung disease) (H)     associated with Sjogrens, also has mildly elevated IgG4, first noted on chest CT 2015 (mild changes) and also has small airways disease; ILD improved on follow up chest CT 2018.     Insomnia, unspecified     weaned off clonazepam     Irregular heart beat      Lumbago 7/09    MRI with DJD, now seeing Dr. Cain for sciatic sx's     Major depressive disorder, recurrent episode, moderate (H)      Obstructive sleep apnea     uses cpap     Osteoarthrosis, unspecified whether generalized or localized, unspecified site      Sjogren's syndrome (H)     + RG and SSA and lip bx     Sleep apnea      Tobacco use disorder     chantix in 9/07, started again in 6/08, working       Past Surgical History:   Procedure Laterality Date     APPENDECTOMY       BACK SURGERY  1962     BACK SURGERY  1962     BIOPSY BREAST  9/27/02    Biopsy Left Breast     BIOPSY BREAST Left 09/27/2002     BREAST SURGERY       C APPENDECTOMY  1970's?     CARDIAC SURGERY       CARDIAC SURGERY       CHOLECYSTECTOMY  1990's?     CHOLECYSTECTOMY       COLECTOMY LEFT  11/7/2011    Procedure:COLECTOMY LEFT; Laparoscopic mobilization of splenic flexture, sigmoid colectomy, coloprotoscopy, loop illeostomy; Surgeon:CK CASTANEDA; Location:[facility] OR     COLECTOMY LEFT  11/07/2011     COLONOSCOPY  1990,s     ENT SURGERY       EYE  SURGERY  2012     FLEXIBLE SIGMOIDOSCOPY  11/03/2011     HYSTERECTOMY TOTAL ABDOMINAL, BILATERAL SALPINGO-OOPHORECTOMY, COMBINED  11/7/2011    Procedure:COMBINED HYSTERECTOMY TOTAL ABDOMINAL, BILATERAL SALPINGO-OOPHORECTOMY; total abdominal hysterectomy, bilateral salpingo-oophorectomy; Surgeon:ALETA MANUEL; Location:[facility] OR     HYSTERECTOMY TOTAL ABDOMINAL, BILATERAL SALPINGO-OOPHORECTOMY, COMBINED  11/07/2011     ILEOSTOMY  02/01/2012    takedown loop ileostomy      INSERT STENT URETER  11/7/2011    Procedure:INSERT STENT URETER; Placement of Bilateral Ureteral Stents ; Surgeon:PRANEETH BRYANT; Location:[facility] OR     SIGMOIDOSCOPY FLEXIBLE  11/3/2011    Procedure:SIGMOIDOSCOPY FLEXIBLE; Flexible Sigmoidoscopy; Surgeon:CK CASTANEDA; Location:[facility] OR     TAKEDOWN ILEOSTOMY  2/1/2012    Procedure:TAKEDOWN ILEOSTOMY; Takedown Loop Ileostomy ; Surgeon:CK CASTANEDA; Location:[facility] OR     URETERAL STENT PLACEMENT  11/07/2011     ZZC NONSPECIFIC PROCEDURE  11/05    exploratory abd lap, adhesions, resolved RLQ pain, diverticulitis episodes       Social History     Socioeconomic History     Marital status: Single     Spouse name: Not on file     Number of children: 0     Years of education: Ed Spec De     Highest education level: Not on file   Occupational History     Occupation: Professor     Employer: [employer]     Comment: [employer] Education   Tobacco Use     Smoking status: Former Smoker     Packs/day: 0.50     Years: 10.00     Pack years: 5.00     Types: Cigarettes     Quit date: 8/1/2011     Years since quitting: 10.2     Smokeless tobacco: Never Used     Tobacco comment: 1/2 ppd   Substance and Sexual Activity     Alcohol use: No     Alcohol/week: 0.0 standard drinks     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     Drug use: No     Sexual activity: Never   Other Topics Concern     Parent/sibling w/ CABG, MI or angioplasty before 65F 55M? Yes   Social History Narrative                  Social Determinants of Health     Financial Resource Strain:      Difficulty of Paying Living Expenses:    Food Insecurity:      Worried About Running Out of Food in the Last Year:      Ran Out of Food in the Last Year:    Transportation Needs:      Lack of Transportation (Medical):      Lack of Transportation (Non-Medical):    Physical Activity:      Days of Exercise per Week:      Minutes of Exercise per Session:    Stress:      Feeling of Stress :    Social Connections:      Frequency of Communication with Friends and Family:      Frequency of Social Gatherings with Friends and Family:      Attends Church Services:      Active Member of Clubs or Organizations:      Attends Club or Organization Meetings:      Marital Status:    Intimate Partner Violence:      Fear of Current or Ex-Partner:      Emotionally Abused:      Physically Abused:      Sexually Abused:        Family History   Problem Relation Age of Onset     C.A.D. Mother 63        MI- first at age 63     Heart Disease Mother      Hypertension Mother      Cerebrovascular Disease Mother      Hyperlipidemia Mother      Coronary Artery Disease Mother         MI-first at age 63     Other - See Comments Mother         cerebrovascular disease      Alcohol/Drug Father      Alzheimer Disease Father      Dementia Father      Hypertension Father      Hyperlipidemia Father      Substance Abuse Father      Diabetes Sister      Hypertension Sister      Hyperlipidemia Sister      Substance Abuse Sister      Asthma Sister      C.A.D. Sister 52        Minor MI- age 50's     Heart Disease Sister      Hypertension Sister      Hypertension Sister      Hypertension Brother      Cancer - colorectal Sister 48        Late 40's early 50's     Prostate Cancer Brother 74        Dx'd age 74     Gastrointestinal Disease Sister         Diverticulitis     Gastrointestinal Disease Brother         Diverticulitis     Lipids Sister      Lipids Sister      Parkinsonism Brother   "    Diabetes Sister      Heart Disease Sister         CHF     Cancer Sister         lung, smoker     Substance Abuse Sister      Substance Abuse Brother      Asthma Sister      Cancer Sister      Breast Cancer Daughter      Prostate Cancer Brother      Hyperlipidemia Brother      Diabetes Other      Hypertension Other      Coronary Artery Disease Sister         minor MI-age 50's     Heart Disease Sister      Hypertension Sister      Hypertension Brother      Hypertension Sister      Colon Cancer Sister      Prostate Cancer Brother      Diverticulitis Sister      Diverticulitis Brother      Parkinsonism Brother      Lung Cancer Sister      Breast Cancer Daughter      Heart Disease Sister         CHF     Diabetes Sister      Asthma Sister              Vitals: /79   Pulse 62   Resp 17   Ht 1.702 m (5' 7\")   Wt 89.4 kg (197 lb)   SpO2 97%   BMI 30.85 kg/m      Exam:   GENERAL APPEARANCE: Well developed, well nourished, alert, and in no apparent distress.  RESP: good air flow throughout.  No crackles. No rhonchi. No wheezes. No inspiratory squeaks.  CV: Normal S1, S2, regular rhythm, normal rate. No murmur.  No LE edema.   MS: extremities normal. No clubbing. No cyanosis.  SKIN: no rash on limited exam.  NEURO: Mentation intact, speech normal, normal stance. Uses a walker to walk.  PSYCH: mentation appears normal. and affect normal/bright.    Results:  Recent Results (from the past 168 hour(s))   General PFT Lab (Please always keep checked)    Collection Time: 10/22/21 10:22 AM   Result Value Ref Range    FVC-Pred 2.65 L    FVC-Pre 2.00 L    FVC-%Pred-Pre 75 %    FEV1-Pre 1.52 L    FEV1-%Pred-Pre 75 %    FEV1FVC-Pred 77 %    FEV1FVC-Pre 76 %    FEFMax-Pred 4.93 L/sec    FEFMax-Pre 5.60 L/sec    FEFMax-%Pred-Pre 113 %    FEF2575-Pred 1.58 L/sec    FEF2575-Pre 1.17 L/sec    PKL3807-%Pred-Pre 74 %    ExpTime-Pre 6.50 sec    FIFMax-Pre 3.36 L/sec    VC-Pred 3.02 L    VC-Pre 2.33 L    VC-%Pred-Pre 77 %    IC-Pred " 2.68 L    IC-Pre 2.17 L    IC-%Pred-Pre 80 %    ERV-Pred 0.34 L    ERV-Pre 0.16 L    ERV-%Pred-Pre 48 %    FEV1FEV6-Pred 77 %    FEV1FEV6-Pre 76 %    FRCPleth-Pred 2.81 L    FRCPleth-Pre 2.25 L    FRCPleth-%Pred-Pre 80 %    RVPleth-Pred 2.31 L    RVPleth-Pre 2.09 L    RVPleth-%Pred-Pre 90 %    TLCPleth-Pred 5.19 L    TLCPleth-Pre 4.42 L    TLCPleth-%Pred-Pre 85 %    DLCOunc-Pred 19.70 ml/min/mmHg    DLCOunc-Pre 16.78 ml/min/mmHg    DLCOunc-%Pred-Pre 85 %    VA-Pre 3.48 L    VA-%Pred-Pre 70 %    FEV1SVC-Pred 66 %    FEV1SVC-Pre 65 %       I reviewed pulmonary function test that was performed today. This shows normal spirometry, lung volumes, and diffusing capacity. Lung function is stable and overall improved compared to 2018 and 2019.    I reviewed results with the patient.      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    1. Follicular bronchiolitis with Sjogren's. Last CT scan showed improvement in her ILD but persistent changes of follicular bronchiolitis. She has done well with treatment of her follicular bronchiolitis, and her PFT is now within normal limits and improved. We will continue prednisone 5 mg daily, azithromycin 250 mg daily, montelukast 10 mg daily, and Breo Ellipta inhaler 100-25. She is tolerating the medications well. I will see her back in 6 months with full PFT.    47 minutes spent reviewing chart, reviewing test results, talking with and examining patient, formulating plan, and documentation on the day of the encounter.

## 2021-10-22 NOTE — LETTER
10/22/2021         RE: Betty Tee  [address]  [facility] [address]        Dear Colleague,    Thank you for referring your patient, Betty Tee, to the [facility] [place]. Please see a copy of my visit note below.    [facility]    Reason for Visit  Betty Tee is a 81 year old year old female who is being seen for RECHECK (Return Sjogrens)    HPI  Sister Betty is an 81-year-old who has Sjogren's follicular bronchiolitis and history of ILD who is here for follow-up.  She had mild ILD on a previous chest CT scan in 2015, but follow-up chest CT scan in 2018 showed improvement with no obvious ILD present on the CT scan.  The follicular bronchiolitis changes were still present.  She has been on prednisone for her Sjogren's for approximately 5 years, and that is managed by Dr. Webster in rheumatology. Her medications for follicular bronchiolitis include current prednisone dose is 5 mg daily, azithromycin 250 mg daily, montelukast daily, and Breo Ellipta inhaler 100-25. These are all anti-inflammatories for her bronchiolitis.    Today, she reports that her dyspnea on exertion is unchanged. She finished physical therapy. She does not exercise, and she uses a walker. Her activity is limited by hip pain. She does wear CPAP for sleep apnea at nighttime. She endorses a cough sometimes, and that is stable. She coughs up white to yellow-colored sputum. She denies fevers or new skin rashes. She reports that she sometimes forgets to take her Breo Ellipta inhaler.    She did receive the flu vaccine and the third shot of the Pfizer COVID-19 vaccine.          Current Outpatient Medications   Medication     ACCU-CHEK SHANNON PLUS test strip     acetaminophen (TYLENOL) 500 MG tablet     acyclovir (ZOVIRAX) 400 MG tablet     acyclovir (ZOVIRAX) 5 % external ointment     albuterol (PROAIR HFA, PROVENTIL HFA,  VENTOLIN HFA) 108 (90 BASE) MCG/ACT inhaler     alendronate (FOSAMAX) 70 MG tablet     anakinra (KINERET) 100 MG/0.67ML SOSY injection     atorvastatin (LIPITOR) 10 MG tablet     azithromycin (ZITHROMAX) 250 MG tablet     BD PEN NEEDLE JANE 2ND GEN 32G X 4 MM miscellaneous     blood glucose (NO BRAND SPECIFIED) lancets standard     cephALEXin (KEFLEX) 250 MG capsule     cevimeline (EVOXAC) 30 MG capsule     Cholecalciferol (VITAMIN D3) 50 MCG (2000 UT) CAPS     COMPOUNDED NON-CONTROLLED SUBSTANCE (CMPD RX) - PHARMACY TO MIX COMPOUNDED MEDICATION     escitalopram (LEXAPRO) 20 MG tablet     famotidine (PEPCID) 10 MG tablet     fluticasone (FLONASE) 50 MCG/ACT nasal spray     fluticasone-vilanterol (BREO ELLIPTA) 100-25 MCG/INH inhaler     HYDROcodone-acetaminophen (NORCO) 7.5-325 MG per tablet     hydroxychloroquine (PLAQUENIL) 200 MG tablet     insulin glargine (BASAGLAR KWIKPEN) 100 UNIT/ML pen     KLOR-CON 20 MEQ CR tablet     lidocaine (LIDODERM) 5 % patch     loratadine (CLARITIN) 10 MG tablet     methocarbamol (ROBAXIN) 750 MG tablet     metoprolol succinate ER (TOPROL-XL) 25 MG 24 hr tablet     metoprolol succinate ER (TOPROL-XL) 50 MG 24 hr tablet     montelukast (SINGULAIR) 10 MG tablet     NEW MED     NOVOLOG FLEXPEN 100 UNIT/ML soln     OZEMPIC, 1 MG/DOSE, 4 MG/3ML SOPN     predniSONE (DELTASONE) 5 MG tablet     rivaroxaban ANTICOAGULANT (XARELTO ANTICOAGULANT) 20 MG TABS tablet     spironolactone (ALDACTONE) 25 MG tablet     torsemide (DEMADEX) 10 MG tablet     torsemide (DEMADEX) 20 MG tablet     traZODone (DESYREL) 50 MG tablet     No current facility-administered medications for this visit.     Allergies   Allergen Reactions     Amoxicillin-Pot Clavulanate      Augmentin Nausea and Vomiting     Codeine Nausea and Vomiting     Codeine      PN: LW Reaction: HIVES     Penicillins Nausea and Vomiting     PN: LW Reaction: GI Upset     Phenobarbital Itching     Phenobarbital      Seasonal Allergies      Past  Medical History:   Diagnosis Date     Alcohol abuse, in remission      Allergic rhinitis, cause unspecified     allegra helps when she takes it     Antiplatelet or antithrombotic long-term use      Atrial fibrillation (H)     in hosp in 11/11 after surgery w/ fluid overload     Cardiomegaly     LVH on stress echo- cardiac w/u at [facility] ER- neg CT scan for PE, neg stress echo in 8/06     Chest pain, unspecified      Congestive heart failure (H)      Depressive disorder      Diabetes (H)      Disorder of bone and cartilage, unspecified     osteopenia (had been on prempro), improved on 6/06 dexa, stable dexa 11/10     Diverticulosis of colon (without mention of hemorrhage)     last episode yrs ago     Essential hypertension, benign      Follicular bronchiolitis (H)     associated with Sjogrens, dx by chest CT showing mosaic attenuation and air trapping     Gastro-oesophageal reflux disease      ILD (interstitial lung disease) (H)     associated with Sjogrens, also has mildly elevated IgG4, first noted on chest CT 2015 (mild changes) and also has small airways disease; ILD improved on follow up chest CT 2018.     Insomnia, unspecified     weaned off clonazepam     Irregular heart beat      Lumbago 7/09    MRI with NEAL, now seeing Dr. Cain for sciatic sx's     Major depressive disorder, recurrent episode, moderate (H)      Obstructive sleep apnea     uses cpap     Osteoarthrosis, unspecified whether generalized or localized, unspecified site      Sjogren's syndrome (H)     + RG and SSA and lip bx     Sleep apnea      Tobacco use disorder     chantix in 9/07, started again in 6/08, working       Past Surgical History:   Procedure Laterality Date     APPENDECTOMY       BACK SURGERY  1962     BACK SURGERY  1962     BIOPSY BREAST  9/27/02    Biopsy Left Breast     BIOPSY BREAST Left 09/27/2002     BREAST SURGERY       C APPENDECTOMY  1970's?     CARDIAC SURGERY       CARDIAC SURGERY       CHOLECYSTECTOMY  1990's?      CHOLECYSTECTOMY       COLECTOMY LEFT  11/7/2011    Procedure:COLECTOMY LEFT; Laparoscopic mobilization of splenic flexture, sigmoid colectomy, coloprotoscopy, loop illeostomy; Surgeon:CK CASTANEDA; Location:[facility] OR     COLECTOMY LEFT  11/07/2011     COLONOSCOPY  1990,s     ENT SURGERY       EYE SURGERY  2012     FLEXIBLE SIGMOIDOSCOPY  11/03/2011     HYSTERECTOMY TOTAL ABDOMINAL, BILATERAL SALPINGO-OOPHORECTOMY, COMBINED  11/7/2011    Procedure:COMBINED HYSTERECTOMY TOTAL ABDOMINAL, BILATERAL SALPINGO-OOPHORECTOMY; total abdominal hysterectomy, bilateral salpingo-oophorectomy; Surgeon:ALETA MANUEL; Location:[facility]     HYSTERECTOMY TOTAL ABDOMINAL, BILATERAL SALPINGO-OOPHORECTOMY, COMBINED  11/07/2011     ILEOSTOMY  02/01/2012    takedown loop ileostomy      INSERT STENT URETER  11/7/2011    Procedure:INSERT STENT URETER; Placement of Bilateral Ureteral Stents ; Surgeon:PRANEETH BRYANT; Location:[facility] OR     SIGMOIDOSCOPY FLEXIBLE  11/3/2011    Procedure:SIGMOIDOSCOPY FLEXIBLE; Flexible Sigmoidoscopy; Surgeon:CK CASTANEDA; Location[facility]     TAKEDOWN ILEOSTOMY  2/1/2012    Procedure:TAKEDOWN ILEOSTOMY; Takedown Loop Ileostomy ; Surgeon:CK CASTANEDA; Location:[facility] OR     URETERAL STENT PLACEMENT  11/07/2011     ZZC NONSPECIFIC PROCEDURE  11/05    exploratory abd lap, adhesions, resolved RLQ pain, diverticulitis episodes       Social History     Socioeconomic History     Marital status: Single     Spouse name: Not on file     Number of children: 0     Years of education: Ed Spec De     Highest education level: Not on file   Occupational History     Occupation: Professor     Employer: [employer]     Comment: [employer]- Education   Tobacco Use     Smoking status: Former Smoker     Packs/day: 0.50     Years: 10.00     Pack years: 5.00     Types: Cigarettes     Quit date: 8/1/2011     Years since quitting: 10.2     Smokeless tobacco: Never Used     Tobacco comment: 1/2 ppd    Substance and Sexual Activity     Alcohol use: No     Alcohol/week: 0.0 standard drinks     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     Drug use: No     Sexual activity: Never   Other Topics Concern     Parent/sibling w/ CABG, MI or angioplasty before 65F 55M? Yes   Social History Narrative                 Social Determinants of Health     Financial Resource Strain:      Difficulty of Paying Living Expenses:    Food Insecurity:      Worried About Running Out of Food in the Last Year:      Ran Out of Food in the Last Year:    Transportation Needs:      Lack of Transportation (Medical):      Lack of Transportation (Non-Medical):    Physical Activity:      Days of Exercise per Week:      Minutes of Exercise per Session:    Stress:      Feeling of Stress :    Social Connections:      Frequency of Communication with Friends and Family:      Frequency of Social Gatherings with Friends and Family:      Attends Scientologist Services:      Active Member of Clubs or Organizations:      Attends Club or Organization Meetings:      Marital Status:    Intimate Partner Violence:      Fear of Current or Ex-Partner:      Emotionally Abused:      Physically Abused:      Sexually Abused:        Family History   Problem Relation Age of Onset     C.A.D. Mother 63        MI- first at age 63     Heart Disease Mother      Hypertension Mother      Cerebrovascular Disease Mother      Hyperlipidemia Mother      Coronary Artery Disease Mother         MI-first at age 63     Other - See Comments Mother         cerebrovascular disease      Alcohol/Drug Father      Alzheimer Disease Father      Dementia Father      Hypertension Father      Hyperlipidemia Father      Substance Abuse Father      Diabetes Sister      Hypertension Sister      Hyperlipidemia Sister      Substance Abuse Sister      Asthma Sister      C.A.D. Sister 52        Minor MI- age 50's     Heart Disease Sister      Hypertension Sister      Hypertension Sister      Hypertension  "Brother      Cancer - colorectal Sister 48        Late 40's early 50's     Prostate Cancer Brother 74        Dx'd age 74     Gastrointestinal Disease Sister         Diverticulitis     Gastrointestinal Disease Brother         Diverticulitis     Lipids Sister      Lipids Sister      Parkinsonism Brother      Diabetes Sister      Heart Disease Sister         CHF     Cancer Sister         lung, smoker     Substance Abuse Sister      Substance Abuse Brother      Asthma Sister      Cancer Sister      Breast Cancer Daughter      Prostate Cancer Brother      Hyperlipidemia Brother      Diabetes Other      Hypertension Other      Coronary Artery Disease Sister         minor MI-age 50's     Heart Disease Sister      Hypertension Sister      Hypertension Brother      Hypertension Sister      Colon Cancer Sister      Prostate Cancer Brother      Diverticulitis Sister      Diverticulitis Brother      Parkinsonism Brother      Lung Cancer Sister      Breast Cancer Daughter      Heart Disease Sister         CHF     Diabetes Sister      Asthma Sister              Vitals: /79   Pulse 62   Resp 17   Ht 1.702 m (5' 7\")   Wt 89.4 kg (197 lb)   SpO2 97%   BMI 30.85 kg/m      Exam:   GENERAL APPEARANCE: Well developed, well nourished, alert, and in no apparent distress.  RESP: good air flow throughout.  No crackles. No rhonchi. No wheezes. No inspiratory squeaks.  CV: Normal S1, S2, regular rhythm, normal rate. No murmur.  No LE edema.   MS: extremities normal. No clubbing. No cyanosis.  SKIN: no rash on limited exam.  NEURO: Mentation intact, speech normal, normal stance. Uses a walker to walk.  PSYCH: mentation appears normal. and affect normal/bright.    Results:  Recent Results (from the past 168 hour(s))   General PFT Lab (Please always keep checked)    Collection Time: 10/22/21 10:22 AM   Result Value Ref Range    FVC-Pred 2.65 L    FVC-Pre 2.00 L    FVC-%Pred-Pre 75 %    FEV1-Pre 1.52 L    FEV1-%Pred-Pre 75 %    " FEV1FVC-Pred 77 %    FEV1FVC-Pre 76 %    FEFMax-Pred 4.93 L/sec    FEFMax-Pre 5.60 L/sec    FEFMax-%Pred-Pre 113 %    FEF2575-Pred 1.58 L/sec    FEF2575-Pre 1.17 L/sec    MIQ6894-%Pred-Pre 74 %    ExpTime-Pre 6.50 sec    FIFMax-Pre 3.36 L/sec    VC-Pred 3.02 L    VC-Pre 2.33 L    VC-%Pred-Pre 77 %    IC-Pred 2.68 L    IC-Pre 2.17 L    IC-%Pred-Pre 80 %    ERV-Pred 0.34 L    ERV-Pre 0.16 L    ERV-%Pred-Pre 48 %    FEV1FEV6-Pred 77 %    FEV1FEV6-Pre 76 %    FRCPleth-Pred 2.81 L    FRCPleth-Pre 2.25 L    FRCPleth-%Pred-Pre 80 %    RVPleth-Pred 2.31 L    RVPleth-Pre 2.09 L    RVPleth-%Pred-Pre 90 %    TLCPleth-Pred 5.19 L    TLCPleth-Pre 4.42 L    TLCPleth-%Pred-Pre 85 %    DLCOunc-Pred 19.70 ml/min/mmHg    DLCOunc-Pre 16.78 ml/min/mmHg    DLCOunc-%Pred-Pre 85 %    VA-Pre 3.48 L    VA-%Pred-Pre 70 %    FEV1SVC-Pred 66 %    FEV1SVC-Pre 65 %       I reviewed pulmonary function test that was performed today. This shows normal spirometry, lung volumes, and diffusing capacity. Lung function is stable and overall improved compared to 2018 and 2019.    I reviewed results with the patient.      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    1. Follicular bronchiolitis with Sjogren's. Last CT scan showed improvement in her ILD but persistent changes of follicular bronchiolitis. She has done well with treatment of her follicular bronchiolitis, and her PFT is now within normal limits and improved. We will continue prednisone 5 mg daily, azithromycin 250 mg daily, montelukast 10 mg daily, and Breo Ellipta inhaler 100-25. She is tolerating the medications well. I will see her back in 6 months with full PFT.    47 minutes spent reviewing chart, reviewing test results, talking with and examining patient, formulating plan, and documentation on the day of the encounter.        Again, thank you for allowing me to participate in the care of  your patient.        Sincerely,        Maryjane Tillman MD

## 2021-10-22 NOTE — NURSING NOTE
Chief Complaint   Patient presents with     RECHECK     Return Sjogrens    Medications reviewed and vital signs taken.   Negro Parsons, TOM

## 2021-10-23 ENCOUNTER — HEALTH MAINTENANCE LETTER (OUTPATIENT)
Age: 81
End: 2021-10-23

## 2021-10-25 LAB
DLCOUNC-%PRED-PRE: 85 %
DLCOUNC-PRE: 16.78 ML/MIN/MMHG
DLCOUNC-PRED: 19.7 ML/MIN/MMHG
ERV-%PRED-PRE: 48 %
ERV-PRE: 0.16 L
ERV-PRED: 0.34 L
EXPTIME-PRE: 6.5 SEC
FEF2575-%PRED-PRE: 74 %
FEF2575-PRE: 1.17 L/SEC
FEF2575-PRED: 1.58 L/SEC
FEFMAX-%PRED-PRE: 113 %
FEFMAX-PRE: 5.6 L/SEC
FEFMAX-PRED: 4.93 L/SEC
FEV1-%PRED-PRE: 75 %
FEV1-PRE: 1.52 L
FEV1FEV6-PRE: 76 %
FEV1FEV6-PRED: 77 %
FEV1FVC-PRE: 76 %
FEV1FVC-PRED: 77 %
FEV1SVC-PRE: 65 %
FEV1SVC-PRED: 66 %
FIFMAX-PRE: 3.36 L/SEC
FRCPLETH-%PRED-PRE: 80 %
FRCPLETH-PRE: 2.25 L
FRCPLETH-PRED: 2.81 L
FVC-%PRED-PRE: 75 %
FVC-PRE: 2 L
FVC-PRED: 2.65 L
IC-%PRED-PRE: 80 %
IC-PRE: 2.17 L
IC-PRED: 2.68 L
RVPLETH-%PRED-PRE: 90 %
RVPLETH-PRE: 2.09 L
RVPLETH-PRED: 2.31 L
TLCPLETH-%PRED-PRE: 85 %
TLCPLETH-PRE: 4.42 L
TLCPLETH-PRED: 5.19 L
VA-%PRED-PRE: 70 %
VA-PRE: 3.48 L
VC-%PRED-PRE: 77 %
VC-PRE: 2.33 L
VC-PRED: 3.02 L

## 2021-11-10 ENCOUNTER — LAB (OUTPATIENT)
Dept: LAB | Facility: CLINIC | Age: 81
End: 2021-11-10
Attending: FAMILY MEDICINE
Payer: MEDICARE

## 2021-11-10 DIAGNOSIS — E11.65 TYPE 2 DIABETES MELLITUS WITH HYPERGLYCEMIA, WITH LONG-TERM CURRENT USE OF INSULIN (H): ICD-10-CM

## 2021-11-10 DIAGNOSIS — Z79.4 TYPE 2 DIABETES MELLITUS WITH HYPERGLYCEMIA, WITH LONG-TERM CURRENT USE OF INSULIN (H): ICD-10-CM

## 2021-11-10 DIAGNOSIS — I10 ESSENTIAL HYPERTENSION WITH GOAL BLOOD PRESSURE LESS THAN 140/90: ICD-10-CM

## 2021-11-10 LAB
ANION GAP SERPL CALCULATED.3IONS-SCNC: 6 MMOL/L (ref 3–14)
BUN SERPL-MCNC: 13 MG/DL (ref 7–30)
CALCIUM SERPL-MCNC: 9.8 MG/DL (ref 8.5–10.1)
CHLORIDE BLD-SCNC: 108 MMOL/L (ref 94–109)
CO2 SERPL-SCNC: 28 MMOL/L (ref 20–32)
CREAT SERPL-MCNC: 1 MG/DL (ref 0.52–1.04)
GFR SERPL CREATININE-BSD FRML MDRD: 53 ML/MIN/1.73M2
GLUCOSE BLD-MCNC: 137 MG/DL (ref 70–99)
POTASSIUM BLD-SCNC: 4.2 MMOL/L (ref 3.4–5.3)
SODIUM SERPL-SCNC: 142 MMOL/L (ref 133–144)

## 2021-11-10 PROCEDURE — 80048 BASIC METABOLIC PNL TOTAL CA: CPT | Performed by: PATHOLOGY

## 2021-11-10 PROCEDURE — 36415 COLL VENOUS BLD VENIPUNCTURE: CPT | Performed by: PATHOLOGY

## 2021-11-16 ENCOUNTER — OFFICE VISIT (OUTPATIENT)
Dept: FAMILY MEDICINE | Facility: CLINIC | Age: 81
End: 2021-11-16
Payer: MEDICARE

## 2021-11-16 VITALS
HEART RATE: 56 BPM | WEIGHT: 194.2 LBS | OXYGEN SATURATION: 97 % | DIASTOLIC BLOOD PRESSURE: 64 MMHG | SYSTOLIC BLOOD PRESSURE: 140 MMHG | BODY MASS INDEX: 30.42 KG/M2 | TEMPERATURE: 97 F

## 2021-11-16 DIAGNOSIS — E78.5 HYPERLIPIDEMIA LDL GOAL <100: ICD-10-CM

## 2021-11-16 DIAGNOSIS — F33.1 MAJOR DEPRESSIVE DISORDER, RECURRENT EPISODE, MODERATE (H): ICD-10-CM

## 2021-11-16 DIAGNOSIS — I50.32 CHRONIC HEART FAILURE WITH PRESERVED EJECTION FRACTION (H): ICD-10-CM

## 2021-11-16 DIAGNOSIS — M54.50 BILATERAL LOW BACK PAIN WITHOUT SCIATICA, UNSPECIFIED CHRONICITY: ICD-10-CM

## 2021-11-16 DIAGNOSIS — E11.65 TYPE 2 DIABETES MELLITUS WITH HYPERGLYCEMIA, WITH LONG-TERM CURRENT USE OF INSULIN (H): Primary | ICD-10-CM

## 2021-11-16 DIAGNOSIS — I10 ESSENTIAL HYPERTENSION WITH GOAL BLOOD PRESSURE LESS THAN 140/90: ICD-10-CM

## 2021-11-16 DIAGNOSIS — I48.0 PAROXYSMAL ATRIAL FIBRILLATION (H): ICD-10-CM

## 2021-11-16 DIAGNOSIS — B37.2 CUTANEOUS CANDIDIASIS: ICD-10-CM

## 2021-11-16 DIAGNOSIS — N39.41 URGE INCONTINENCE OF URINE: ICD-10-CM

## 2021-11-16 DIAGNOSIS — Z79.4 TYPE 2 DIABETES MELLITUS WITH HYPERGLYCEMIA, WITH LONG-TERM CURRENT USE OF INSULIN (H): Primary | ICD-10-CM

## 2021-11-16 DIAGNOSIS — G57.01 PIRIFORMIS SYNDROME, RIGHT: ICD-10-CM

## 2021-11-16 PROCEDURE — 99215 OFFICE O/P EST HI 40 MIN: CPT | Performed by: FAMILY MEDICINE

## 2021-11-16 RX ORDER — KETOCONAZOLE 20 MG/G
CREAM TOPICAL 2 TIMES DAILY PRN
Qty: 60 G | Refills: 1 | Status: SHIPPED | OUTPATIENT
Start: 2021-11-16

## 2021-11-16 NOTE — PROGRESS NOTES
Assessment & Plan       ICD-10-CM    1. Type 2 diabetes mellitus with hyperglycemia, with long-term current use of insulin (H)  E11.65 Hemoglobin A1c    Z79.4 TSH with free T4 reflex   2. Essential hypertension with goal blood pressure less than 140/90  I10 Albumin Random Urine Quantitative with Creat Ratio   3. Hyperlipidemia LDL goal <100  E78.5 Lipid panel reflex to direct LDL Fasting   4. Cutaneous candidiasis  B37.2 ketoconazole (NIZORAL) 2 % external cream   5. Major depressive disorder, recurrent episode, moderate (H)  F33.1    6. Chronic heart failure with preserved ejection fraction (H)  I50.32    7. Paroxysmal atrial fibrillation (H)  I48.0    8. Urge incontinence of urine  N39.41    9. Bilateral low back pain without sciatica, unspecified chronicity  M54.50    10. Piriformis syndrome, right  G57.01       DM II- Will get labs the next time she's in for labs for rheumatology in a few weeks- full set of yearly labs.  Last A1C looked good at 7.1 in 6/21.  Home glucose checks have looked good- checking glucose ~3x/day, which has been helpful for her for diet guideance.  Will cont same meds until labs reviewed.  Will get full set of DM labs when she goes in next for lab draw at the [facility].    HTN- BP borderline today, but they've ronald okay on most of the recent visits.  Will cont current meds.    Cardiology f/u- Cont f/u with Dr. Rosa for CHF and a.fib.    Candidiasis- she declines exam today, but notes that groin rash has been very annoying/itchy, as well as some spots on her feet.  Has responded to anti-fungal in past, so will send in refills.  Rec using 2x/day for 1-2 wks, and if improving, continue until rash gone, then cont x 3 days.    Urology- pt doesn't like the estradiol cream, but will cont trial to see if helpful.    Back pain, piriformis pain- pt has felt the pool therapy has been helpful, especially the deep massage recently there, and would like to continue.  Will discuss with Dr. Moore  "at upcoming visit.  She also notes the CBD oil she's been using has been helpful for her shoulder and knee pain- may try it on back/piriformis area. She is not sure if the medical cannibus has been helpful for not- will continue to assess.  Feels the consistent tylenol use is likely helpful.  Hasn't liked the narcotics as much due to se's.    Med clean-up- reviewed and clarified medication list with pt today.    Stomach issues-  Briefly discussed. She doesn't feel comfortable when she eats, but all better once she has a BM.      Return in about 3 months (around 2/16/2022) for Diabetes, Routine Visit/Chronic Cares/Med Check.      46 minutes spent on the date of the encounter doing chart review, review of outside records, review of test results, interpretation of tests, patient visit and documentation        BMI:   Estimated body mass index is 30.42 kg/m  as calculated from the following:    Height as of 10/22/21: 1.702 m (5' 7\").    Weight as of this encounter: 88.1 kg (194 lb 3.2 oz).   Weight management plan: Discussed healthy diet and exercise guidelines      Return in about 3 months (around 2/16/2022) for Diabetes, Routine Visit/Chronic Cares/Med Check.    Ilene Tristan MD  [facility]            Subjective   Sister Betty is a 81 year old who presents for the following health issues - multiple    History of Present Illness       Back Pain:  She presents for follow up of back pain. Patient's back pain is a chronic problem.  Location of back pain:  Right lower back, right buttock and right hip  Description of back pain: burning and dull ache  Back pain spreads: right buttocks, right thigh and right foot    Since patient first noticed back pain, pain is: always present, but gets better and worse  Does back pain interfere with her job:  No      Diabetes:   She presents for follow up of diabetes.  She is checking home blood glucose three times daily. She checks blood glucose before " meals.  Blood glucose is sometimes over 200 and never under 70. She is aware of hypoglycemia symptoms including shakiness, dizziness and weakness. She has no concerns regarding her diabetes at this time.  She is having numbness in feet, burning in feet and weight loss.         She eats 2-3 servings of fruits and vegetables daily.She consumes 0 sweetened beverage(s) daily.She exercises with enough effort to increase her heart rate 9 or less minutes per day.  She exercises with enough effort to increase her heart rate 3 or less days per week.   She is taking medications regularly.     DM II- checking glucose readings ~3x/day.  Looking pretty good.    Back pain-   Seeing Dr. Moore.  Started medical marijuana.  Not sure if it's helping.  At one point, thought she got a little too high of a dose- usually at ~1.5mg, a few times a week.  They said max of 3mg.    Taking consistent tylenol, doesn't like the narcotics- se's.  Pool therapy at [facility] -   Coming to the end of that therapy, only two make-ups now.  Deep massage last time seemed to really 'get it'.  Will discuss with Dr. Moore to see if more could be done.    CBD cream seems to help some of her pains- shoulder pain, knee pain.  Hasn't tried it on her back.    Med clean-up- reviewed and clarified medication list with pt today.    Groin rash/pain- burning, red.  Comes and goes.    Stomach issues-  She doesn't feel comfortable when she eats, but all better once she has a BM.    MDD/Anxiety- mood is 'pretty good, I think'.  Just doesn't let people irritate her.        Diabetes Follow-up  How often are you checking your blood sugar? Three times daily  Blood sugar testing frequency justification:  Patient modifying lifestyle changes (diet, exercise) with blood sugars  What time of day are you checking your blood sugars (select all that apply)?  Before meals  Have you had any blood sugars above 200?  Yes   Have you had any blood sugars below 70?  No    What  symptoms do you notice when your blood sugar is low?  Pareshky    What concerns do you have today about your diabetes? None     Do you have any of these symptoms? (Select all that apply)  Numbness in feet, burning in feet and weight loss      BP Readings from Last 2 Encounters:   11/24/21 130/77   11/16/21 (!) 140/64     Hemoglobin A1C (%)   Date Value   06/04/2021 7.1 (H)   03/03/2021 8.0 (H)     LDL Cholesterol Calculated (mg/dL)   Date Value   10/20/2020 19   01/21/2020 1         How many servings of fruits and vegetables do you eat daily?  2-3    On average, how many sweetened beverages do you drink each day (Examples: soda, juice, sweet tea, etc.  Do NOT count diet or artificially sweetened beverages)?   0    How many days per week do you exercise enough to make your heart beat faster? 3 or less    How many minutes a day do you exercise enough to make your heart beat faster? 9 or less    How many days per week do you miss taking your medication? 0        Review of Systems   Constitutional, HEENT, cardiovascular, pulmonary, gi and gu systems are negative, except as otherwise noted.      Objective    BP (!) 140/64   Pulse 56   Temp 97  F (36.1  C) (Temporal)   Wt 88.1 kg (194 lb 3.2 oz)   SpO2 97%   BMI 30.42 kg/m    Body mass index is 30.42 kg/m .  Physical Exam   GENERAL APPEARANCE: healthy, alert and no distress     EYES: sclera clear, EOMI     RESP: lungs clear to auscultation - no rales, rhonchi or wheezes     CV: regular rates and rhythm, normal S1 S2, no S3 or S4 and no murmur, click or rub      Ext: warm, dry     Psych: full range affect, normal speech and grooming, judgement and insight intact       Lab on 11/10/2021   Component Date Value Ref Range Status     Sodium 11/10/2021 142  133 - 144 mmol/L Final     Potassium 11/10/2021 4.2  3.4 - 5.3 mmol/L Final     Chloride 11/10/2021 108  94 - 109 mmol/L Final     Carbon Dioxide (CO2) 11/10/2021 28  20 - 32 mmol/L Final     Anion Gap 11/10/2021 6  3 - 14  mmol/L Final     Urea Nitrogen 11/10/2021 13  7 - 30 mg/dL Final     Creatinine 11/10/2021 1.00  0.52 - 1.04 mg/dL Final     Calcium 11/10/2021 9.8  8.5 - 10.1 mg/dL Final     Glucose 11/10/2021 137* 70 - 99 mg/dL Final     GFR Estimate 11/10/2021 53* >60 mL/min/1.73m2 Final    As of July 11, 2021, eGFR is calculated by the CKD-EPI creatinine equation, without race adjustment. eGFR can be influenced by muscle mass, exercise, and diet. The reported eGFR is an estimation only and is only applicable if the renal function is stable.

## 2021-11-24 ENCOUNTER — TELEPHONE (OUTPATIENT)
Dept: PALLIATIVE MEDICINE | Facility: CLINIC | Age: 81
End: 2021-11-24

## 2021-11-24 ENCOUNTER — OFFICE VISIT (OUTPATIENT)
Dept: PALLIATIVE MEDICINE | Facility: CLINIC | Age: 81
End: 2021-11-24
Payer: MEDICARE

## 2021-11-24 VITALS — DIASTOLIC BLOOD PRESSURE: 77 MMHG | OXYGEN SATURATION: 98 % | HEART RATE: 58 BPM | SYSTOLIC BLOOD PRESSURE: 130 MMHG

## 2021-11-24 DIAGNOSIS — G57.01 PIRIFORMIS SYNDROME, RIGHT: Primary | ICD-10-CM

## 2021-11-24 DIAGNOSIS — M54.16 LUMBAR RADICULOPATHY: ICD-10-CM

## 2021-11-24 DIAGNOSIS — M47.816 SPONDYLOSIS OF LUMBAR REGION WITHOUT MYELOPATHY OR RADICULOPATHY: ICD-10-CM

## 2021-11-24 PROCEDURE — 99213 OFFICE O/P EST LOW 20 MIN: CPT | Performed by: PHYSICAL MEDICINE & REHABILITATION

## 2021-11-24 ASSESSMENT — PAIN SCALES - GENERAL: PAINLEVEL: MILD PAIN (2)

## 2021-11-24 NOTE — PATIENT INSTRUCTIONS
1. Call the number below for a refill of norco when you have 4-5 tablets left.    2. I'll place a chiropractic referral, you'll be called to schedule.    3. Follow up in 2 months, call the number below to schedule.    Take care,    Charly Moore DO  [facility]        ----------------------------------------------------------------  Clinic Number:  [phone]     Call with any questions about your care and for scheduling assistance.     Calls are returned Monday through Friday between 8 AM and 4:30 PM. We usually get back to you within 2 business days depending on the issue/request.    If we are prescribing your medications:    For opioid medication refills, call the clinic or send a Meitu message 7 days in advance.  Please include:    Name of requested medication    Name of the pharmacy.    For non-opioid medications, call your pharmacy directly to request a refill. Please allow 3-4 days to be processed.     Per [place] State Law:    All controlled substance prescriptions must be filled within 30 days of being written.      For those controlled substances allowing refills, pickup must occur within 30 days of last fill.      We believe regular attendance is key to your success in our program!      Any time you are unable to keep your appointment we ask that you call us at least 24 hours in advance to cancel.This will allow us to offer the appointment time to another patient.     Multiple missed appointments may lead to dismissal from the clinic.

## 2021-11-24 NOTE — TELEPHONE ENCOUNTER
Faxed Referral to Catie Ibrahim, Fax [phone].    Right fax confirmed.      Zayra Christy MA  [facility] [place]

## 2021-11-24 NOTE — NURSING NOTE
PEG Score 11/24/2021   PEG Total Score 3.67       Zayra Christy MA  [facility] [place]

## 2021-11-24 NOTE — PROGRESS NOTES
[facility]    Date of visit: 11/24/21       Assessment:  Sister Betty Buckner is a 81 year old medically complex patient with past medical history including: Atrial fibrillation, VLH, History of DVT, CHF, HLD, HTN, DM 2, Stage 3 CKD, RLS, Depression, History of etoh abuse (remission 30+ years) who presents for evaluation and treatment of the following chronic pain conditions:     1. Right sided hip and leg pain: Patient describes a long standing history of right hip and leg pain. She reports pain that starts in her right low back/upper buttock and radiates laterally and posteriorly down the right leg to the foot.  Based on this it appears her symptoms are due to a combination of lumbar spondylosis/radiculopathy, right gluteal dysfunction and piriformis syndrome.    MRI is concerning for right S1 nerve root abutment /impingement, no improvement noted with a S1 liliya. Due to ongoing buttock, hip and right leg radiating pain we tried a piriformis injection which resulted in about 60% improvement in her symptoms which lasted about 8 weeks before gradually returning. Repeat injection provided about 50% relief for a shorter duration of time. At this time patient prefers non-interventional management but we can consider repeat piriformis injections for flares.            Plan:  The following recommendations were given to the patient. Diagnosis, treatment options, risks, benefits, and alternatives were discussed, and all questions were answered. The patient expressed understanding of the plan for management.      I am recommending a multidisciplinary treatment plan to help this patient better manage her pain.  This includes:      1. Physical Therapy: Continue prior PT exercises  2. Clinical Health Pain Psychologist: Coping well defer.  3. Diagnostic Studies: None at this time.  4. Medication Management:   1. Continue Norco 7.5-325mg BID prn. Patient typically  taking 1 tablet daily.  2. Continue methocarbamol 750mg TID prn.   3. Continue tylenol 1000mg TID prn.  4. CSA/UDS - 9/2/21  5. Further procedures recommended: right piriformis injection can be repeated every 3+ months as needed.  6. Referral: chiropractic referral placed.  7. Follow up: as needed.      DO [facility]           Chief complaint:   No chief complaint on file.      Interval history:  Betty Tee is a 80 year old female last seen by me on 9/2/21.        Since her last visit, Betty Tee reports:    -They've continued to have aching pain in the right leg.    -Norco does seem to help with they take it. They are concerned whether this is a good long term medication. It is very effective when they use it. Typically they take this medication later in the day when their pain starts to flare-up.    -The pain doesn't seem as sharp and shooting any more, more of an aching type pain.    -Medical cannabis did not help.    -Pool therapy was going ok, they had a massage during their last session which helped a lot      Pain scores:   Pain intensity on average is 4 on a scale of 0-10.        Pain Treatments:  1. Medications:       Current pain medications:  -Tylenol 1000mg q8h prn - Usually takes 1-2 times a day  -Escitalopram 20mg daily   -Norco 7.5-325mg prn - takes it but not regularly   -methocarbamol 750mg tid prn   -Voltaren 1% gel qid prn       Previous pain medications:  -Tramadol 50mg prn  -Tizanidine, flexeril - nh   -Gabapentin 300mg TID - memory difficulty  2. Physical Therapy: hasn't completed PT for low back or hip pain                TENS unit: hasn't tried  3. Pain psychology: hasn't tried  4. Surgery: Lumbar spine surgery in the 1960s.  5. Injections:   -Right piriformis injection on 4/1/21 with 60% relief for 8 weeks. Repeated in June with approx 50% relief to date.  -Two epidural injections (right L5 tfesi), feb 2020 was helpful the next day but she had  a fall right after, aug 2020 procedure - not helpful, had worsening pain  6. Alternative Therapies:               Chiropractic: hasn't tried               Acupuncture: hasn't tried          Side Effects: no side effect    Medications:  Current Outpatient Medications   Medication Sig Dispense Refill     ACCU-CHEK SHANNON PLUS test strip USE TO TEST BLOOD SUGARS 3 TIMES DAILY 300 strip 5     acetaminophen (TYLENOL) 500 MG tablet Take 1,000 mg by mouth every 8 hours as needed (max 6 tablets/24 hours, 2 tablets/dose)        acyclovir (ZOVIRAX) 400 MG tablet Take 1 tablet (400 mg) by mouth 3 times daily For a couple days 15 tablet 2     acyclovir (ZOVIRAX) 5 % external ointment Apply topically 6 times daily As needed for outbreaks 15 g 3     albuterol (PROAIR HFA, PROVENTIL HFA, VENTOLIN HFA) 108 (90 BASE) MCG/ACT inhaler Inhale 2 puffs into the lungs every 6 hours 1 Inhaler 3     alendronate (FOSAMAX) 70 MG tablet Take 1 tablet (70 mg) by mouth every 7 days 12 tablet 2     anakinra (KINERET) 100 MG/0.67ML SOSY injection 100 mg every day x 3 days as needed for flare ups 6.7 mL 3     atorvastatin (LIPITOR) 10 MG tablet TAKE 1/2 TABLET BY MOUTH EVERY DAY 45 tablet 3     azithromycin (ZITHROMAX) 250 MG tablet Take 1 tablet (250 mg) by mouth daily 30 tablet 11     BD PEN NEEDLE JANE 2ND GEN 32G X 4 MM miscellaneous USE 4 DAILY AS DIRECTED. 400 each 0     blood glucose (NO BRAND SPECIFIED) lancets standard Use to test blood sugar 3 times daily or as directed 100 lancet 3     cevimeline (EVOXAC) 30 MG capsule Take 1 capsule (30 mg) by mouth 3 times daily 270 capsule 2     Cholecalciferol (VITAMIN D3) 50 MCG (2000 UT) CAPS Take 1 tablet (50 mcg) by mouth daily - Oral 90 capsule 2     COMPOUNDED NON-CONTROLLED SUBSTANCE (CMPD RX) - PHARMACY TO MIX COMPOUNDED MEDICATION Estriol 1 mg/g Apply small amount to finger and apply to inside vagina daily for 2 weeks then twice weekly Route: vaginally Dispense 30 grams 11 refills 30 g 11      escitalopram (LEXAPRO) 20 MG tablet Take 1 tablet (20 mg) by mouth daily 90 tablet 0     famotidine (PEPCID) 10 MG tablet Take 10 mg by mouth 2 times daily       fluticasone (FLONASE) 50 MCG/ACT nasal spray Spray 1-2 sprays into both nostrils daily as needed for allergies 18.2 mL 11     fluticasone-vilanterol (BREO ELLIPTA) 100-25 MCG/INH inhaler Inhale 1 puff into the lungs daily 1 Inhaler 11     HYDROcodone-acetaminophen (NORCO) 7.5-325 MG per tablet Take 1 tablet by mouth every 12 hours Ok to dispense and start 10/4/21 60 tablet 0     hydroxychloroquine (PLAQUENIL) 200 MG tablet Take 2 tablets (400 mg) by mouth daily . Annual Plaquenil toxicity eye screening required, due 1-2022 180 tablet 0     insulin glargine (BASAGLAR KWIKPEN) 100 UNIT/ML pen INJECT 22 UNITS SUBCUTANEOUS DAILY (TO REPLACE LANTUS) 15 mL 1     ketoconazole (NIZORAL) 2 % external cream Apply topically 2 times daily as needed for itching 60 g 1     KLOR-CON 20 MEQ CR tablet TAKE 2 TABLETS (40 MEQ) BY MOUTH EVERY MORNING AND 1 TABLET (20 MEQ) EVERY EVENING. 270 tablet 3     lidocaine (LIDODERM) 5 % patch APPLY PATCH TO PAINFUL AREA FOR UP TO 12 H WITHIN A 24 H PERIOD. REMOVE AFTER 12 HOURS. 30 patch 2     loratadine (CLARITIN) 10 MG tablet TAKE 1 TABLET BY MOUTH EVERY DAY 90 tablet 0     methocarbamol (ROBAXIN) 750 MG tablet Take 1 tablet (750 mg) by mouth 3 times daily as needed for muscle spasms 90 tablet 3     metoprolol succinate ER (TOPROL-XL) 25 MG 24 hr tablet Take 0.5 tablets (12.5 mg) by mouth daily TAKE 1/2 TABLET BY MOUTH 2 TIMES DAILY WITH 50 MG FOR A TOTAL OF 62.5 TWICE A DAY 45 tablet 3     metoprolol succinate ER (TOPROL-XL) 50 MG 24 hr tablet Take 1 tablet (50 mg) by mouth daily (in combination with 12.5mg for a total of 62.5mg twice a day)       montelukast (SINGULAIR) 10 MG tablet TAKE 1 TABLET BY MOUTH EVERYDAY AT BEDTIME 90 tablet 0     NOVOLOG FLEXPEN 100 UNIT/ML soln INJECT 12 UNITS SUBCUTANEOUS 3 TIMES DAILY (WITH MEALS) 15  mL 2     OZEMPIC, 1 MG/DOSE, 4 MG/3ML SOPN INJECT 1 MG SUBCUTANEOUS EVERY 7 DAYS 3 mL 1     predniSONE (DELTASONE) 5 MG tablet Take 1 tablet (5 mg) by mouth daily 30 tablet 0     rivaroxaban ANTICOAGULANT (XARELTO ANTICOAGULANT) 20 MG TABS tablet TAKE 1 TABLET BY MOUTH DAILY WITH DINNER 90 tablet 3     spironolactone (ALDACTONE) 25 MG tablet Take 2 tablets (50 mg) by mouth daily 180 tablet 3     torsemide (DEMADEX) 10 MG tablet TAKE ONE TAB (10 MG) WITH ONE 20 MG TAB TO = 30 MG DAILY IN AFTERNOON. 90 tablet 3     torsemide (DEMADEX) 20 MG tablet TAKE 2 TABLETS (40 MG) BY MOUTH EVERY MORNING AND 1 TABLET (20 MG) DAILY AT 2 PM. TAKE THE AFTERNOON DOSE WITH AN EXTRA 10 MG TAB FOR A TOTAL OF 30 MG IN THE AFTERNOON 270 tablet 0     traZODone (DESYREL) 50 MG tablet TAKE 0.5-1.5 TABLETS (25-75 MG) BY MOUTH NIGHTLY AS NEEDED FOR SLEEP 135 tablet 0       Medical History: any changes in medical history since they were last seen? No    Review of Systems:  Positive for:  diabetes, gout, back pain, arthritis, paresthesias      /77   Pulse 58   SpO2 98%   Gen: NAD pleasant  Neuro/MSK: Uses fww for ambulation. Lumbar ROM is mildly reduced in flexion, severely reduced in ext/rot bilaterally. Right piriformis tenderness noted. No GT tenderness. Strength is 5/5 and symmetric in the lower extremities.        BILLING TIME DOCUMENTATION:   The total TIME spent on this patient on the date of the encounter/appointment was 25minutes.      TOTAL TIME includes:   Time spent preparing to see the patient (reviewing records and tests)   Time spent face to face (or over the phone) with the patient   Time spent ordering tests, medications, procedures and referrals   Time spent Referring and communicating with other healthcare professionals   Time spent documenting clinical information in Epic           Charly Moore DO  [facility]

## 2021-12-01 ENCOUNTER — TELEPHONE (OUTPATIENT)
Dept: FAMILY MEDICINE | Facility: CLINIC | Age: 81
End: 2021-12-01
Payer: MEDICARE

## 2021-12-01 DIAGNOSIS — M25.551 HIP PAIN, RIGHT: Primary | ICD-10-CM

## 2021-12-01 NOTE — TELEPHONE ENCOUNTER
Reason for Call: Request for an order or referral:    Order or referral being requested: ray for right hip xray.Seen [facility] and they requesting a xray . Patient needs order has another appt Friday and they want imaging done at [facility] [address]. Xray order please  Requesting a call once order is placed  Date needed: as soon as possible    Has the patient been seen by the PCP for this problem? YES    Additional comments:     Phone number Patient can be reached at:  Home number on file [phone] (home)    Best Time:  any    Can we leave a detailed message on this number?  YES    Call taken on 12/1/2021 at 3:49 PM by Juana Arroyo

## 2021-12-02 DIAGNOSIS — G89.29 OTHER CHRONIC PAIN: ICD-10-CM

## 2021-12-02 DIAGNOSIS — M16.11 OSTEOARTHRITIS OF RIGHT HIP, UNSPECIFIED OSTEOARTHRITIS TYPE: ICD-10-CM

## 2021-12-02 RX ORDER — HYDROCODONE BITARTRATE AND ACETAMINOPHEN 7.5; 325 MG/1; MG/1
1 TABLET ORAL EVERY 12 HOURS
Qty: 60 TABLET | Refills: 0 | Status: SHIPPED | OUTPATIENT
Start: 2021-12-02 | End: 2022-01-07

## 2021-12-02 NOTE — TELEPHONE ENCOUNTER
Reason for call:  Medication   If this is a refill request, has the caller requested the refill from the pharmacy already? No  Will the patient be using a [facility]? No  Name of the pharmacy and phone number for the current request: [id number] - [place], [place] - [address]    Name of the medication requested: HYDROcodone-acetaminophen (NORCO) 7.5-325 MG per tablet    Other request: Per pt the Chiropractor that Oscar referred her to was awesome and she wanted to say Thank You she is going back tomorrow.    Phone number to reach patient:  Home number on file [phone] (home)    Can we leave a detailed message on this number?  YES    Travel screening: Not Applicable         Jovanni TRAN    [facility] [place]

## 2021-12-02 NOTE — TELEPHONE ENCOUNTER
Signed Prescriptions:                        Disp   Refills    HYDROcodone-acetaminophen (NORCO) 7.5-325 *60 tab*0        Sig: Take 1 tablet by mouth every 12 hours Ok to dispense           and start 12/02/21  Authorizing Provider: JONG HSU      Reviewed [place] Prescription Monitoring Program, appears appropriate.     Request seems appropriate, refilled for provider who is out of office.    LZIY Bowles [facility]

## 2021-12-02 NOTE — TELEPHONE ENCOUNTER
Received call from patient requesting refill(s) of HYDROcodone-acetaminophen (NORCO) 7.5-325 MG per tablet     Last dispensed from pharmacy on 10/4/21    Patient's last office/virtual visit by prescribing provider on 11/24/21  Next office/virtual appointment scheduled for 2/25/22    Last urine drug screen date 9/2/21  Current opioid agreement on file (completed within the last year) Yes Date of opioid agreement: 10/19/21    E-prescribe to [id number] - [place] [facility]    Will route to nursing [place] for review and preparation of prescription(s).

## 2021-12-02 NOTE — TELEPHONE ENCOUNTER
Routing to provider to review medication prepped per below      Norco 7.5-325, #60, Refill:no  Sig:Ok to dispense and start 12/02/21  Last picked up 10/04/21   Due: anytime    Per last OV note 11/24/21:    1. Medication Management:   1. Continue Norco 7.5-325mg BID prn. Patient typically taking 1 tablet daily.      Claudia LANDON, RN Care Coordinator  [facility]  Pain Management

## 2021-12-03 NOTE — TELEPHONE ENCOUNTER
[facility] Video message sent with Rx approval from provider.  Bel[facility]  Pain Clinic Management Team

## 2021-12-09 ENCOUNTER — ANCILLARY PROCEDURE (OUTPATIENT)
Dept: GENERAL RADIOLOGY | Facility: CLINIC | Age: 81
End: 2021-12-09
Attending: FAMILY MEDICINE
Payer: MEDICARE

## 2021-12-09 ENCOUNTER — LAB (OUTPATIENT)
Dept: LAB | Facility: CLINIC | Age: 81
End: 2021-12-09
Payer: MEDICARE

## 2021-12-09 DIAGNOSIS — M10.9 GOUT, UNSPECIFIED CAUSE, UNSPECIFIED CHRONICITY, UNSPECIFIED SITE: ICD-10-CM

## 2021-12-09 DIAGNOSIS — E11.65 TYPE 2 DIABETES MELLITUS WITH HYPERGLYCEMIA, WITH LONG-TERM CURRENT USE OF INSULIN (H): ICD-10-CM

## 2021-12-09 DIAGNOSIS — I10 ESSENTIAL HYPERTENSION WITH GOAL BLOOD PRESSURE LESS THAN 140/90: ICD-10-CM

## 2021-12-09 DIAGNOSIS — M16.11 OSTEOARTHRITIS OF RIGHT HIP, UNSPECIFIED OSTEOARTHRITIS TYPE: ICD-10-CM

## 2021-12-09 DIAGNOSIS — Z79.4 TYPE 2 DIABETES MELLITUS WITH HYPERGLYCEMIA, WITH LONG-TERM CURRENT USE OF INSULIN (H): ICD-10-CM

## 2021-12-09 DIAGNOSIS — M35.3 POLYMYALGIA RHEUMATICA (H): ICD-10-CM

## 2021-12-09 DIAGNOSIS — M25.551 HIP PAIN, RIGHT: ICD-10-CM

## 2021-12-09 DIAGNOSIS — Z79.899 LONG-TERM USE OF PLAQUENIL: ICD-10-CM

## 2021-12-09 DIAGNOSIS — E78.5 HYPERLIPIDEMIA LDL GOAL <100: ICD-10-CM

## 2021-12-09 LAB
ALBUMIN SERPL-MCNC: 3.2 G/DL (ref 3.4–5)
ALT SERPL W P-5'-P-CCNC: 17 U/L (ref 0–50)
AST SERPL W P-5'-P-CCNC: 15 U/L (ref 0–45)
BASOPHILS # BLD AUTO: 0 10E3/UL (ref 0–0.2)
BASOPHILS NFR BLD AUTO: 0 %
CHOLEST SERPL-MCNC: 104 MG/DL
CREAT SERPL-MCNC: 1.05 MG/DL (ref 0.52–1.04)
CREAT UR-MCNC: 14 MG/DL
CRP SERPL-MCNC: 21.2 MG/L (ref 0–8)
EOSINOPHIL # BLD AUTO: 0.2 10E3/UL (ref 0–0.7)
EOSINOPHIL NFR BLD AUTO: 3 %
ERYTHROCYTE [DISTWIDTH] IN BLOOD BY AUTOMATED COUNT: 13.5 % (ref 10–15)
ERYTHROCYTE [SEDIMENTATION RATE] IN BLOOD BY WESTERGREN METHOD: 18 MM/HR (ref 0–30)
FASTING STATUS PATIENT QL REPORTED: NO
GFR SERPL CREATININE-BSD FRML MDRD: 50 ML/MIN/1.73M2
HBA1C MFR BLD: 6.3 % (ref 0–5.6)
HCT VFR BLD AUTO: 39.6 % (ref 35–47)
HDLC SERPL-MCNC: 59 MG/DL
HGB BLD-MCNC: 13.1 G/DL (ref 11.7–15.7)
IMM GRANULOCYTES # BLD: 0 10E3/UL
IMM GRANULOCYTES NFR BLD: 0 %
LDLC SERPL CALC-MCNC: 16 MG/DL
LYMPHOCYTES # BLD AUTO: 1.1 10E3/UL (ref 0.8–5.3)
LYMPHOCYTES NFR BLD AUTO: 16 %
MCH RBC QN AUTO: 31.1 PG (ref 26.5–33)
MCHC RBC AUTO-ENTMCNC: 33.1 G/DL (ref 31.5–36.5)
MCV RBC AUTO: 94 FL (ref 78–100)
MICROALBUMIN UR-MCNC: 7 MG/L
MICROALBUMIN/CREAT UR: 50 MG/G CR (ref 0–25)
MONOCYTES # BLD AUTO: 0.9 10E3/UL (ref 0–1.3)
MONOCYTES NFR BLD AUTO: 13 %
NEUTROPHILS # BLD AUTO: 4.8 10E3/UL (ref 1.6–8.3)
NEUTROPHILS NFR BLD AUTO: 68 %
NONHDLC SERPL-MCNC: 45 MG/DL
NRBC # BLD AUTO: 0 10E3/UL
NRBC BLD AUTO-RTO: 0 /100
PLATELET # BLD AUTO: 213 10E3/UL (ref 150–450)
RBC # BLD AUTO: 4.21 10E6/UL (ref 3.8–5.2)
TRIGL SERPL-MCNC: 145 MG/DL
TSH SERPL DL<=0.005 MIU/L-ACNC: 2.22 MU/L (ref 0.4–4)
URATE SERPL-MCNC: 7.4 MG/DL (ref 2.6–6)
WBC # BLD AUTO: 7.1 10E3/UL (ref 4–11)

## 2021-12-09 PROCEDURE — 84450 TRANSFERASE (AST) (SGOT): CPT | Performed by: PATHOLOGY

## 2021-12-09 PROCEDURE — 82043 UR ALBUMIN QUANTITATIVE: CPT | Performed by: PATHOLOGY

## 2021-12-09 PROCEDURE — 80061 LIPID PANEL: CPT | Performed by: PATHOLOGY

## 2021-12-09 PROCEDURE — 84550 ASSAY OF BLOOD/URIC ACID: CPT | Performed by: PATHOLOGY

## 2021-12-09 PROCEDURE — 85652 RBC SED RATE AUTOMATED: CPT | Performed by: PATHOLOGY

## 2021-12-09 PROCEDURE — 73502 X-RAY EXAM HIP UNI 2-3 VIEWS: CPT | Mod: RT | Performed by: RADIOLOGY

## 2021-12-09 PROCEDURE — 36415 COLL VENOUS BLD VENIPUNCTURE: CPT | Performed by: PATHOLOGY

## 2021-12-09 PROCEDURE — 84460 ALANINE AMINO (ALT) (SGPT): CPT | Performed by: PATHOLOGY

## 2021-12-09 PROCEDURE — 84443 ASSAY THYROID STIM HORMONE: CPT | Performed by: PATHOLOGY

## 2021-12-09 PROCEDURE — 83036 HEMOGLOBIN GLYCOSYLATED A1C: CPT | Performed by: PATHOLOGY

## 2021-12-09 PROCEDURE — 82040 ASSAY OF SERUM ALBUMIN: CPT | Performed by: PATHOLOGY

## 2021-12-09 PROCEDURE — 85025 COMPLETE CBC W/AUTO DIFF WBC: CPT | Performed by: PATHOLOGY

## 2021-12-09 PROCEDURE — 82565 ASSAY OF CREATININE: CPT | Performed by: PATHOLOGY

## 2021-12-09 PROCEDURE — 86140 C-REACTIVE PROTEIN: CPT | Performed by: PATHOLOGY

## 2021-12-10 NOTE — RESULT ENCOUNTER NOTE
The right hip looks worse - severe degeneration seen now, worse compared to the 3/20 xray.  Hopefully they were able to review the xray at your appointment today.    Best,   Lev Tristan MD

## 2021-12-13 DIAGNOSIS — E11.65 TYPE 2 DIABETES MELLITUS WITH HYPERGLYCEMIA, WITH LONG-TERM CURRENT USE OF INSULIN (H): ICD-10-CM

## 2021-12-13 DIAGNOSIS — Z79.4 TYPE 2 DIABETES MELLITUS WITH HYPERGLYCEMIA, WITH LONG-TERM CURRENT USE OF INSULIN (H): ICD-10-CM

## 2021-12-13 NOTE — RESULT ENCOUNTER NOTE
--Your TSH (thyroid stimulating hormone, which is elevated in hypothyroidism, and lowered in hyperthyroidism) looks good.  --Your cholesterol panel continues to look good with a very low LDL (the bad cholesterol) and a normal HDL (the good cholesterol).   ---Your hemoglobin A1C (the three month average of your glucose levels) looks very good at 6.3 (down from 7.1 at the last check).   --Your microalbumin level (which is the urine test that can signal signs of early chronic kidney disease if elevated to >30) still a bit elevated but it's fairly stable.      Please let me know if you have any questions.  Best,   Lev Tristan MD

## 2021-12-14 NOTE — TELEPHONE ENCOUNTER
CW,    Routing refill request to provider for review/approval because:  Labs out of range:    Creatinine   Date Value Ref Range Status   12/09/2021 1.05 (H) 0.52 - 1.04 mg/dL Final   06/04/2021 1.11 (H) 0.52 - 1.04 mg/dL Final     Last OV 11/16/21.    Thanks,  Sophia Hemphill RN

## 2021-12-15 ENCOUNTER — TELEPHONE (OUTPATIENT)
Dept: PALLIATIVE MEDICINE | Facility: CLINIC | Age: 81
End: 2021-12-15
Payer: MEDICARE

## 2021-12-15 RX ORDER — SEMAGLUTIDE 1.34 MG/ML
INJECTION, SOLUTION SUBCUTANEOUS
Qty: 3 ML | Refills: 1 | Status: SHIPPED | OUTPATIENT
Start: 2021-12-15 | End: 2022-02-08

## 2021-12-15 NOTE — TELEPHONE ENCOUNTER
Catie Ibrahim calling to follow up on referral for patient. Phone number in routing comments. Also informed her Dr. Moore would be responding to the email.    Mya ESQUIVEL    [facility]

## 2021-12-16 DIAGNOSIS — M16.11 OSTEOARTHRITIS OF RIGHT HIP, UNSPECIFIED OSTEOARTHRITIS TYPE: Primary | ICD-10-CM

## 2021-12-16 NOTE — PROGRESS NOTES
Called patient to discuss their pain. They were evaluated by Catie (chiropractic) who felt their hip arthritis was contributing to their low back/buttock pain. 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 Recommended a hip injection and the patient agreed. Orders were placed.    Charly Moore DO  [facility]

## 2021-12-16 NOTE — TELEPHONE ENCOUNTER
Called and discussed with patient, will close this encounter.    Charly Moore DO  [facility]

## 2021-12-22 ENCOUNTER — TELEPHONE (OUTPATIENT)
Dept: PALLIATIVE MEDICINE | Facility: CLINIC | Age: 81
End: 2021-12-22

## 2021-12-22 NOTE — TELEPHONE ENCOUNTER
Screening Questions for Radiology Injections:    Injection to be done at which interventional clinic site? [facility]    Remind [place] Pt's that Covid test is no longer required except for sedation procedure Pt's.    Instruct patient to arrive as directed prior to the scheduled appointment time:    [facility]: 30 minutes before      [place]: 30 minutes before; if IV needed 1 hour before     Procedure ordered by Oscar    Procedure ordered? right hip joint injection      Transforaminal Cervical ANDRAE -  [facility] does NOT have providers that do these- [facility] do have providers that do    As a reminder, receiving steroids can decrease your body's ability to fight infection.   Would you still like to move forward with scheduling the injection?  Yes    What insurance would patient like us to bill for this procedure? BCBS      Worker's comp or MVA (motor vehicle accident) -Any injection DO NOT SCHEDULE and route to Shona Vann.      [employer] insurance - For SI joint injections, DO NOT SCHEDULE and route Shona Vann.       ALL BCBS, [employer] and [employer]-Route to Shona for review DO NOT SCHEDULE      IF SCHEDULING IN [place] AND NEEDS A PA, IT IS OKAY TO SCHEDULE. [place] HANDLES THEIR OWN PA'S AFTER THE PATIENT IS SCHEDULED. PLEASE SCHEDULE AT LEAST 1 WEEK OUT SO A PA CAN BE OBTAINED.    Any chance of pregnancy? NO   If YES, do NOT schedule and route to RN pool    Is an  needed? No     Patient has a drive home? (mandatory) YES: INFORMED    Is patient taking any blood thinners (That is: Coumadin, Warfarin, Jantoven, Pradaxa Xarelto, Eliquis, Edoxaban, Enoxaparin, Lovenox, Heparin, Arixtra, Fondaparinux, or Fragmin? OR Antiplatelet medication such as Plavix, Brilinta, or Effient? )? Yes - xarelto (no hold)   If hold needed, do NOT schedule, route to RN pool     Is patient taking any aspirin products (includes Excedrin and Fiorinal)? No     If more than 325mg/day, OK to schedule;  Instruct pt to decrease to less than 325 mg for 7 days AND route to RN pool    For CERVICAL procedures, hold all aspirin products for 6 days.     Tell pt that if aspirin product is not held for 6 days, the procedure WILL BE cancelled.      Does the patient have a bleeding or clotting disorder? No     If YES, okay to schedule AND route to RN nurse pool    For any patients with platelet count <100, must be forwarded to provider    Any allergies to contrast dye, iodine, shellfish, or numbing and steroid medications? No    If YES, add allergy information to appointment notes AND route to the RN pool     If ANDRAE and Contrast Dye / Iodine Allergy? DO NOT SCHEDULE, route to RN pool    Allergies: Amoxicillin-pot clavulanate, Augmentin, Codeine, Codeine, Penicillins, Phenobarbital, Phenobarbital, and Seasonal allergies     Is patient diabetic?  Yes  If YES, instruct them to bring their glucometer.    Does patient have an active infection or treated for one within the past week? No     Is patient currently taking any antibiotics?  No     For patients on chronic, preventative, or prophylactic antibiotics, procedures may be scheduled.     For patients on antibiotics for active or recent infection:antibiotic course must have been completed for 4 days    Is patient currently taking any steroid medications? (i.e. Prednisone, Medrol)  No     For patients on steroid medications, course must have been completed for 4 days    Is patient actively being treated for cancer or immunocompromised? No  If YES, do NOT schedule and route to RN pool     Are you able to get on and off an exam table with minimal or no assistance? Yes  If NO, do NOT schedule and route to RN pool    Are you able to roll over and lay on your stomach with minimal or no assistance? Yes  If NO, do NOT schedule and route to RN pool     Has the patient had a flu shot or any other vaccinations within 7 days before or after the procedure.  No     Have you recently had a  COVID vaccine or have plans to get it in the near future? No    If yes, explain that for the vaccine to work best they need to:       wait 1 week before and 1 week after getting Vaccine #1    wait 1 week before and 2 weeks after getting Vaccine #2    wait 1 week before and 2 weeks after getting Vaccine BOOSTER    If patient has concerns about the timing, send to RN pool     Does patient have an MRI/CT?  Not Applicable  Check Procedure Scheduling Grid to see if required.      Was the MRI done within the last 3 years?  NA    If yes, where was the MRI done i.e.[facility], [facility], [facility], [facility] etc?       If no, do not schedule and route to RN pool    If MRI was not done at [facility], [facility] or [facility] do NOT schedule and route to RN pool.      If pt has an imaging disc, the injection MAY be scheduled but pt has to bring disc to appt.     If they show up without the disc the injection cannot be done    Procedure Specific Instructions:      If celiac plexus block, informed patient NPO for 6 hours and that it is okay to take medications with sips of water, especially blood pressure medications  Not Applicable         If this is for a cervical procedure, informed patient that aspirin needs to be held for 6 days.   Not Applicable      If IV needed:    Do not schedule procedures requiring IV placement in the first appointment of the day or first appointment after lunch. Do NOT schedule at 0745, 0815 or 1245.     Instructed pt to arrive 30 minutes early for IV start if required. (Check Procedure Scheduling Grid)  Not Applicable    Reminders:      If you are started on any steroids or antibiotics between now and your appointment, you must contact us because the procedure may need to be cancelled.  Yes      For all procedures except radiofrequency ablations (RFAs) and spinal cord stimulator (SCS) trials, informed patient:    IV sedation is not provided for this procedure.  If you feel that an oral  anti-anxiety medication is needed, you can discuss this further with your referring provider or primary care provider.  The Pain Clinic provider will discuss specifics of what the procedure includes at your appointment.  Most procedures last 10-20 minutes.  We use numbing medications to help with any discomfort during the procedure.  Not Applicable      For patients 85 or older we recommend having an adult stay w/ them for the remainder of the day.       Does the patient have any questions?  NO  Mya Martin  [facility]

## 2021-12-22 NOTE — TELEPHONE ENCOUNTER
[place] to schedule right hip joint injection      Jovanni TRAN    [facility] [place]

## 2021-12-22 NOTE — TELEPHONE ENCOUNTER
No PA required, okay to schedule      Shona SARABIA    [facility]

## 2021-12-23 ENCOUNTER — MEDICAL CORRESPONDENCE (OUTPATIENT)
Dept: HEALTH INFORMATION MANAGEMENT | Facility: CLINIC | Age: 81
End: 2021-12-23
Payer: MEDICARE

## 2021-12-23 ENCOUNTER — CARE COORDINATION (OUTPATIENT)
Dept: SLEEP MEDICINE | Facility: CLINIC | Age: 81
End: 2021-12-23
Payer: MEDICARE

## 2021-12-23 NOTE — PROGRESS NOTES
Received Pap, and pap supply renewal letter of medical necessity from [facility].  Form signed by [facility], faxed along with copy of Epic order cpap supplies to FAX:  [phone].  Copy sent to [facility] Medical records to be scanned into epic.

## 2021-12-30 DIAGNOSIS — E11.9 TYPE 2 DIABETES MELLITUS WITHOUT COMPLICATION, WITHOUT LONG-TERM CURRENT USE OF INSULIN (H): ICD-10-CM

## 2021-12-30 DIAGNOSIS — F33.1 MAJOR DEPRESSIVE DISORDER, RECURRENT EPISODE, MODERATE (H): ICD-10-CM

## 2021-12-31 DIAGNOSIS — I48.21 PERMANENT ATRIAL FIBRILLATION (H): ICD-10-CM

## 2021-12-31 DIAGNOSIS — I50.30 HEART FAILURE WITH PRESERVED EJECTION FRACTION, UNSPECIFIED HF CHRONICITY (H): ICD-10-CM

## 2021-12-31 RX ORDER — ESCITALOPRAM OXALATE 20 MG/1
TABLET ORAL
Qty: 90 TABLET | Refills: 0 | Status: SHIPPED | OUTPATIENT
Start: 2021-12-31 | End: 2022-03-28

## 2021-12-31 RX ORDER — PEN NEEDLE, DIABETIC 32GX 5/32"
NEEDLE, DISPOSABLE MISCELLANEOUS
Qty: 400 EACH | Refills: 1 | Status: SHIPPED | OUTPATIENT
Start: 2021-12-31 | End: 2022-07-22

## 2021-12-31 NOTE — TELEPHONE ENCOUNTER
"Routing refill request to provider for review/approval because:  Labs out of range:  PHQ9 score of 8 9/14/21.  Please authorize if appropriate.  Thanks,  Radha Otero RN            Prescription approved per [facility] Refill Protocol.  Pen needles.      Requested Prescriptions   Pending Prescriptions Disp Refills     escitalopram (LEXAPRO) 20 MG tablet [Pharmacy Med Name: ESCITALOPRAM 20 MG TABLET] 90 tablet 0     Sig: TAKE 1 TABLET BY MOUTH EVERY DAY       SSRIs Protocol Failed - 12/30/2021  6:55 PM        Failed - PHQ-9 score less than 5 in past 6 months     Please review last PHQ-9 score.           Passed - Medication is active on med list        Passed - Patient is age 18 or older        Passed - No active pregnancy on record        Passed - No positive pregnancy test in last 12 months        Passed - Recent (6 mo) or future (30 days) visit within the authorizing provider's specialty     Patient had office visit in the last 6 months or has a visit in the next 30 days with authorizing provider or within the authorizing provider's specialty.  See \"Patient Info\" tab in inbasket, or \"Choose Columns\" in Meds & Orders section of the refill encounter.               BD PEN NEEDLE JANE 2ND GEN 32G X 4 MM miscellaneous [Pharmacy Med Name: BD JANE 2 GEN PEN NDL [id number]] 400 each 1     Sig: USE 4 DAILY AS DIRECTED.       Diabetic Supplies Protocol Passed - 12/30/2021  6:55 PM        Passed - Medication is active on med list        Passed - Patient is 18 years of age or older        Passed - Recent (6 mo) or future (30 days) visit within the authorizing provider's specialty     Patient had office visit in the last 6 months or has a visit in the next 30 days with authorizing provider.  See \"Patient Info\" tab in inbasket, or \"Choose Columns\" in Meds & Orders section of the refill encounter.                 "

## 2022-01-05 RX ORDER — METOPROLOL SUCCINATE 50 MG/1
TABLET, EXTENDED RELEASE ORAL
Qty: 90 TABLET | Refills: 3 | Status: SHIPPED | OUTPATIENT
Start: 2022-01-05 | End: 2022-02-11

## 2022-01-06 DIAGNOSIS — M16.11 OSTEOARTHRITIS OF RIGHT HIP, UNSPECIFIED OSTEOARTHRITIS TYPE: ICD-10-CM

## 2022-01-06 DIAGNOSIS — G89.29 OTHER CHRONIC PAIN: ICD-10-CM

## 2022-01-06 NOTE — TELEPHONE ENCOUNTER
Will route to MA pool for assistance with gathering opioid refill information.    Ilene SHERIDAN RN Care Coordinator  [facility]

## 2022-01-06 NOTE — TELEPHONE ENCOUNTER
Reason for call:  Medication   If this is a refill request, has the caller requested the refill from the pharmacy already? No  Will the patient be using a [facility]? No  Name of the pharmacy and phone number for the current request: [id number] - [place], [place] - [address]    Name of the medication requested: HYDROcodone-acetaminophen (NORCO) 7.5-325 MG per tablet    Phone number to reach patient:  Home number on file [phone] (home)    Can we leave a detailed message on this number?  YES    Travel screening: Not Applicable            Jovanni TRAN    [facility] [place]

## 2022-01-07 RX ORDER — HYDROCODONE BITARTRATE AND ACETAMINOPHEN 7.5; 325 MG/1; MG/1
1 TABLET ORAL EVERY 12 HOURS
Qty: 60 TABLET | Refills: 0 | Status: SHIPPED | OUTPATIENT
Start: 2022-01-07 | End: 2022-02-07

## 2022-01-07 NOTE — TELEPHONE ENCOUNTER
[employer] message sent with Rx approval from provider.  Belén  Pain Clinic Management Team

## 2022-01-07 NOTE — TELEPHONE ENCOUNTER
Script Eprescribed to pharmacy  [place] Prescription Monitoring Program checked    Will send this to MA team to notify patient.    Signed Prescriptions:                        Disp   Refills    HYDROcodone-acetaminophen (NORCO) 7.5-325 *60 tab*0        Sig: Take 1 tablet by mouth every 12 hours OK to start and           fill 1/7/22  Authorizing Provider: PURVI HANSON MD  [facility]

## 2022-01-07 NOTE — TELEPHONE ENCOUNTER
Received call from patient  requesting refill(s) for HYDROcodone-acetaminophen (NORCO) 7.5-325 MG per tablet     Last dispensed from pharmacy on 12/2/21    Patient's last office/virtual visit by prescribing provider on 11/24/21  Next office/virtual appointment scheduled for 1/11/22    Last urine drug screen date 9/2/21  Current opioid agreement on file? Yes Date of opioid agreement: 10/19/21    E-prescribe to:    [id number] - [place], [place] - [address]    Will route to [facility] for review and preparation of prescription(s).

## 2022-01-07 NOTE — TELEPHONE ENCOUNTER
Routing to provider pool to review medication prepped per below for Dr Moore's pt    HYDROcodone-acetaminophen (NORCO) 7.5-325 MG per tablet #60 Refill:0  Sig:Take 1 tablet by mouth every 12 hours   Last picked up 12/2/21 with start on 12/2/21  Due: OK to start and fill 1/7/22    Per last OV note 11/24/21: Continue Norco 7.5-325mg BID prn. Patient typically taking 1 tablet daily.    [id number] - [place], [place] - [address]    Ilene SHERIDAN RN Care Coordinator  [facility]

## 2022-01-10 DIAGNOSIS — I50.30 (HFPEF) HEART FAILURE WITH PRESERVED EJECTION FRACTION (H): ICD-10-CM

## 2022-01-10 NOTE — PROGRESS NOTES
[facility]    Date of visit: 1/11/22       Assessment:  Sister Betty Buckner is a 81 year old medically complex patient with past medical history including: Atrial fibrillation, VLH, History of DVT, CHF, HLD, HTN, DM 2, Stage 3 CKD, RLS, Depression, History of etoh abuse (remission 30+ years) who presents for evaluation and treatment of the following chronic pain conditions:     1. Right sided hip and leg pain: Patient describes a long standing history of right hip and leg pain. They've been having worsening right lateral hip and groin pain in the last 2-3 months. They also have a long standing history of pain that starts in her right low back/upper buttock and radiates laterally and posteriorly down the right leg to the foot. They had a positive salomón and hip scour and significant right GT bursa tenderness on exam today. Based on this it appears her symptoms are due to a combination of lumbar spondylosis/radiculopathy, piriformis syndrome, Right hip severe OA and right hip bursitis.        Plan:  The following recommendations were given to the patient. Diagnosis, treatment options, risks, benefits, and alternatives were discussed, and all questions were answered. The patient expressed understanding of the plan for management.      I am recommending a multidisciplinary treatment plan to help this patient better manage her pain.  This includes:      1. Physical Therapy: Chronic pain PT referral placed.  2. Clinical Health Pain Psychologist: Coping well defer.  3. Diagnostic Studies: None at this time.  4. Medication Management:   1. Continue Norco 7.5-325mg BID prn.  2. Continue methocarbamol 750mg TID prn.   3. Schedule tylenol 1000mg TID prn while pain is severe.  4. CSA/UDS - 9/2/21  5. Further procedures recommended:   1. Right piriformis injection can be repeated every 3+ months as needed.  2. Right hip bursa injection ordered.  6. Referral:  chiropractic referral placed.  7. Follow up: 1 month after hip bursa injection.      DO [facility]           Chief complaint:   No chief complaint on file.      Interval history:  Betty Tee is a 80 year old female last seen by me on 11/24/21.        Since her last visit, Betty Tee reports:    -They've continued to have aching pain in the right leg. These symptoms have been worsening in the past couple months.    -They have pain in the right groin and lateral hip area which is the most significant region of pain at this time.    -Norco has been helping, they are able to sleep more comfortably when they use this medication.    -They also have pain radiating down the leg laterally in addition to the above symptoms.    -The right hip area is very tender and they cannot lie on their right side.     -No significant improvement with acupuncture or pool therapy. Massage does seem to help.      Pain scores:   Pain intensity on average is 6-7 on a scale of 0-10.        Pain Treatments:  1. Medications:       Current pain medications:  -Tylenol 1000mg q8h prn - Usually takes 1-2 times a day  -Escitalopram 20mg daily   -Norco 7.5-325mg prn - takes it but not regularly   -methocarbamol 750mg tid prn   -Voltaren 1% gel qid prn       Previous pain medications:  -Tramadol 50mg prn  -Tizanidine, flexeril - nh   -Gabapentin 300mg TID - memory difficulty  2. Physical Therapy: hasn't completed PT for low back or hip pain                TENS unit: hasn't tried  3. Pain psychology: hasn't tried  4. Surgery: Lumbar spine surgery in the 1960s.  5. Injections:   -Right piriformis injection on 4/1/21 with 60% relief for 8 weeks. Repeated in June with approx 50% relief.  -Two epidural injections (right L5 tfesi), feb 2020 was helpful the next day but she had a fall right after, aug 2020 procedure - not helpful, had worsening pain  6. Alternative Therapies:               Chiropractic: hasn't  tried               Acupuncture: hasn't tried          Side Effects: no side effect    Medications:  Current Outpatient Medications   Medication Sig Dispense Refill     ACCU-CHEK SHANNON PLUS test strip USE TO TEST BLOOD SUGARS 3 TIMES DAILY 300 strip 5     acetaminophen (TYLENOL) 500 MG tablet Take 1,000 mg by mouth every 8 hours as needed (max 6 tablets/24 hours, 2 tablets/dose)        acyclovir (ZOVIRAX) 400 MG tablet Take 1 tablet (400 mg) by mouth 3 times daily For a couple days 15 tablet 2     acyclovir (ZOVIRAX) 5 % external ointment Apply topically 6 times daily As needed for outbreaks 15 g 3     albuterol (PROAIR HFA, PROVENTIL HFA, VENTOLIN HFA) 108 (90 BASE) MCG/ACT inhaler Inhale 2 puffs into the lungs every 6 hours 1 Inhaler 3     alendronate (FOSAMAX) 70 MG tablet Take 1 tablet (70 mg) by mouth every 7 days 12 tablet 2     anakinra (KINERET) 100 MG/0.67ML SOSY injection 100 mg every day x 3 days as needed for flare ups 6.7 mL 3     atorvastatin (LIPITOR) 10 MG tablet TAKE 1/2 TABLET BY MOUTH EVERY DAY 45 tablet 3     azithromycin (ZITHROMAX) 250 MG tablet Take 1 tablet (250 mg) by mouth daily 30 tablet 11     BD PEN NEEDLE AJNE 2ND GEN 32G X 4 MM miscellaneous USE 4 DAILY AS DIRECTED. 400 each 1     blood glucose (NO BRAND SPECIFIED) lancets standard Use to test blood sugar 3 times daily or as directed 100 lancet 3     cevimeline (EVOXAC) 30 MG capsule Take 1 capsule (30 mg) by mouth 3 times daily 270 capsule 2     Cholecalciferol (VITAMIN D3) 50 MCG (2000 UT) CAPS Take 1 tablet (50 mcg) by mouth daily - Oral 90 capsule 2     COMPOUNDED NON-CONTROLLED SUBSTANCE (CMPD RX) - PHARMACY TO MIX COMPOUNDED MEDICATION Estriol 1 mg/g Apply small amount to finger and apply to inside vagina daily for 2 weeks then twice weekly Route: vaginally Dispense 30 grams 11 refills 30 g 11     escitalopram (LEXAPRO) 20 MG tablet TAKE 1 TABLET BY MOUTH EVERY DAY 90 tablet 0     famotidine (PEPCID) 10 MG tablet Take 10 mg by  mouth 2 times daily       fluticasone (FLONASE) 50 MCG/ACT nasal spray Spray 1-2 sprays into both nostrils daily as needed for allergies 18.2 mL 11     fluticasone-vilanterol (BREO ELLIPTA) 100-25 MCG/INH inhaler Inhale 1 puff into the lungs daily 1 Inhaler 11     HYDROcodone-acetaminophen (NORCO) 7.5-325 MG per tablet Take 1 tablet by mouth every 12 hours OK to start and fill 1/7/22 60 tablet 0     hydroxychloroquine (PLAQUENIL) 200 MG tablet Take 2 tablets (400 mg) by mouth daily . Annual Plaquenil toxicity eye screening required, due 1-2022 180 tablet 0     insulin glargine (BASAGLAR KWIKPEN) 100 UNIT/ML pen INJECT 22 UNITS SUBCUTANEOUS DAILY (TO REPLACE LANTUS) 15 mL 1     ketoconazole (NIZORAL) 2 % external cream Apply topically 2 times daily as needed for itching 60 g 1     KLOR-CON 20 MEQ CR tablet TAKE 2 TABLETS (40 MEQ) BY MOUTH EVERY MORNING AND 1 TABLET (20 MEQ) EVERY EVENING. 270 tablet 3     lidocaine (LIDODERM) 5 % patch APPLY PATCH TO PAINFUL AREA FOR UP TO 12 H WITHIN A 24 H PERIOD. REMOVE AFTER 12 HOURS. 30 patch 2     loratadine (CLARITIN) 10 MG tablet TAKE 1 TABLET BY MOUTH EVERY DAY 90 tablet 3     methocarbamol (ROBAXIN) 750 MG tablet Take 1 tablet (750 mg) by mouth 3 times daily as needed for muscle spasms 90 tablet 3     metoprolol succinate ER (TOPROL-XL) 25 MG 24 hr tablet Take 0.5 tablets (12.5 mg) by mouth daily TAKE 1/2 TABLET BY MOUTH 2 TIMES DAILY WITH 50 MG FOR A TOTAL OF 62.5 TWICE A DAY 45 tablet 3     metoprolol succinate ER (TOPROL-XL) 50 MG 24 hr tablet TAKE 50 MG BY MOUTH IN COMBINATION WITH 12.5 FOR A TOTAL OF 62.5 TWICE A DAY 90 tablet 3     montelukast (SINGULAIR) 10 MG tablet TAKE 1 TABLET BY MOUTH EVERYDAY AT BEDTIME 90 tablet 0     NOVOLOG FLEXPEN 100 UNIT/ML soln INJECT 12 UNITS SUBCUTANEOUS 3 TIMES DAILY (WITH MEALS) 15 mL 2     OZEMPIC, 1 MG/DOSE, 4 MG/3ML SOPN INJECT 1 MG SUBCUTANEOUS EVERY 7 DAYS 3 mL 1     predniSONE (DELTASONE) 5 MG tablet Take 1 tablet (5 mg) by  mouth daily 30 tablet 0     rivaroxaban ANTICOAGULANT (XARELTO ANTICOAGULANT) 20 MG TABS tablet TAKE 1 TABLET BY MOUTH DAILY WITH DINNER 90 tablet 3     spironolactone (ALDACTONE) 25 MG tablet Take 2 tablets (50 mg) by mouth daily 180 tablet 3     torsemide (DEMADEX) 10 MG tablet TAKE ONE TAB (10 MG) WITH ONE 20 MG TAB TO = 30 MG DAILY IN AFTERNOON. 90 tablet 3     torsemide (DEMADEX) 20 MG tablet TAKE 2 TABLETS (40 MG) BY MOUTH EVERY MORNING AND 1 TABLET (20 MG) DAILY AT 2 PM. TAKE THE AFTERNOON DOSE WITH AN EXTRA 10 MG TAB FOR A TOTAL OF 30 MG IN THE AFTERNOON 270 tablet 0     traZODone (DESYREL) 50 MG tablet TAKE 0.5-1.5 TABLETS (25-75 MG) BY MOUTH NIGHTLY AS NEEDED FOR SLEEP 135 tablet 0       Medical History: any changes in medical history since they were last seen? No    Review of Systems:  Positive for:  diabetes, gout, back pain, arthritis, paresthesias      /78   Pulse 55   SpO2 99%   Gen: NAD pleasant  Neuro/MSK: Uses fww for ambulation. Lumbar ROM is mildly reduced in flexion, severely reduced in ext/rot bilaterally. Right piriformis tenderness noted. Right GT bursa tenderness noted. Positive hip scour and salomón on the right. Strength is 5/5 and symmetric in the lower extremities.        BILLING TIME DOCUMENTATION:   The total TIME spent on this patient on the date of the encounter/appointment was 40 minutes.      TOTAL TIME includes:   Time spent preparing to see the patient (reviewing records and tests)   Time spent face to face (or over the phone) with the patient   Time spent ordering tests, medications, procedures and referrals   Time spent Referring and communicating with other healthcare professionals   Time spent documenting clinical information in Epic           Charly Moore DO  [facility]

## 2022-01-11 ENCOUNTER — OFFICE VISIT (OUTPATIENT)
Dept: PALLIATIVE MEDICINE | Facility: CLINIC | Age: 82
End: 2022-01-11
Payer: MEDICARE

## 2022-01-11 VITALS — DIASTOLIC BLOOD PRESSURE: 78 MMHG | HEART RATE: 55 BPM | OXYGEN SATURATION: 99 % | SYSTOLIC BLOOD PRESSURE: 131 MMHG

## 2022-01-11 DIAGNOSIS — M16.11 OSTEOARTHRITIS OF RIGHT HIP, UNSPECIFIED OSTEOARTHRITIS TYPE: ICD-10-CM

## 2022-01-11 DIAGNOSIS — M70.61 TROCHANTERIC BURSITIS OF RIGHT HIP: Primary | ICD-10-CM

## 2022-01-11 DIAGNOSIS — M47.816 SPONDYLOSIS OF LUMBAR REGION WITHOUT MYELOPATHY OR RADICULOPATHY: ICD-10-CM

## 2022-01-11 DIAGNOSIS — G57.01 PIRIFORMIS SYNDROME, RIGHT: ICD-10-CM

## 2022-01-11 PROCEDURE — 99215 OFFICE O/P EST HI 40 MIN: CPT | Performed by: PHYSICAL MEDICINE & REHABILITATION

## 2022-01-11 ASSESSMENT — PAIN SCALES - GENERAL: PAINLEVEL: EXTREME PAIN (8)

## 2022-01-11 NOTE — PATIENT INSTRUCTIONS
1. I ordered a hip bursa injection, you'll be called to schedule this.    2. I ordered chronic pain PT, you'll be called to schedule.    3. On severe pain days, take norco before sleep so the pain isn't waking you up at night as much.   -Continue methocarbamol 750mg and tylenol 1000mg three times daily as needed.   -Consider scheduling tylenol every 6 hours during the day time for the next week.    Take care,    Charly Moore [facility]            ----------------------------------------------------------------  Clinic Number:  [phone]     Call with any questions about your care and for scheduling assistance.     Calls are returned Monday through Friday between 8 AM and 4:30 PM. We usually get back to you within 2 business days depending on the issue/request.    If we are prescribing your medications:    For opioid medication refills, call the clinic or send a Dexetra message 7 days in advance.  Please include:    Name of requested medication    Name of the pharmacy.    For non-opioid medications, call your pharmacy directly to request a refill. Please allow 3-4 days to be processed.     Per [place] State Law:    All controlled substance prescriptions must be filled within 30 days of being written.      For those controlled substances allowing refills, pickup must occur within 30 days of last fill.      We believe regular attendance is key to your success in our program!      Any time you are unable to keep your appointment we ask that you call us at least 24 hours in advance to cancel.This will allow us to offer the appointment time to another patient.     Multiple missed appointments may lead to dismissal from the clinic.

## 2022-01-12 ENCOUNTER — TELEPHONE (OUTPATIENT)
Dept: PALLIATIVE MEDICINE | Facility: CLINIC | Age: 82
End: 2022-01-12
Payer: MEDICARE

## 2022-01-12 NOTE — TELEPHONE ENCOUNTER
TORSEMIDE 20 MG TABLET   Last Written Prescription Date:  10/13/2021  Last Fill Quantity: 270,   # refills: 0  Last Office Visit :  9/9/2021  Future Office visit:  None    Routing refill request to provider for review/approval because:  Abnormal Creatinine  Refer to clinic for review     Recent Labs   Lab Test 12/09/21  1033   CR 1.05*       Mere Weinberg RN  Central Triage Red Flags/Med Refills

## 2022-01-12 NOTE — TELEPHONE ENCOUNTER
Pre-screening Questions for Clinic Based Injections, Shoulder/Knee, ONB, TPI Injections and Bursa Injection:    Botox- no questions needed  *of note, it is possible to do occipital nerve blocks and trigger point injections without steroid, so if they feel they need to schedule, we can do that and leave out steroid.      Location:    [place]:     Only schedule on Wednesdays (ultrasound machine NOT available Tuesday and Thursday)    If need to double book, use the 3:30 pm slot     [place]:  Ultrasound appointments NOT available at this time    [place]:      [place]:  Please send to RN pool after scheduling to verify U/S availability     Does patient have an active infection or treated for one within the past week? No  (If YES, do NOT schedule and route to RN)     Is patient currently taking any antibiotics?  Yes - chronic  (If YES, do NOT schedule and route to RN)  For patients on chronic, preventative or prophylactic antibiotics, procedures can be scheduled.    Phillip Garcia Snitzer-antibiotic course must have been completed for 4 days    Is patient taking any aspirin products? No     No need to hold non-aspirin anticoagulants.     If taking > 325mg/day of aspirin, limit aspirin to 81-325mg/day x 1 week.     No hold required day of procedure.    Has the patient had a flu shot or any other vaccinations within 7 days before or after the procedure.  No     Have you recently had a COVID vaccine or have plans to get it in the near future? No    If yes, explain that for the vaccine to work best they need to:       wait 1 week before and 1 week after getting Vaccine #1    wait 1 week before and 2 weeks after getting Vaccine #2    If patient has concerns about the timing, send to JASON ESQUIVEL    [facility]

## 2022-01-13 RX ORDER — TORSEMIDE 20 MG/1
TABLET ORAL
Qty: 270 TABLET | Refills: 3 | Status: SHIPPED | OUTPATIENT
Start: 2022-01-13 | End: 2023-02-02

## 2022-01-20 DIAGNOSIS — M35.02 SJOGREN'S SYNDROME WITH LUNG INVOLVEMENT (H): ICD-10-CM

## 2022-01-20 NOTE — LETTER
Betty Tee  [address]  [place] [address]    Dear Betty Tee,     Regular eye exams are required while taking hydroxychloroquine (Plaquenil). Eye exams should be completed by an eye specialist who is experienced in monitoring for hydroxychloroquine toxicity (a rare effect of the drug that can damage your eyes and vision).  These may be yearly or as determined by your eye specialist.     Although vision problems and loss of sight while taking hydroxychloroquine are very rare, notify your doctor immediately if you notice changes in your vision. The goal of screening is to detect toxicity before your vision is significantly or noticeably impacted. Failing to get proper screening exams puts you at risk of vision changes which may or may not be reversible.    Per the American Academy of Ophthalmology recommendations (2016), screening tests performed may include a 10-2 visual field test, spectral-domain optical coherence tomography (SD OCT), or other screening tests as determined by the eye specialist, including a multifocal electroretinogram (mfERG) or fundus auto-fluorescence (FAF).    We received a refill request from your pharmacy. A copy of your current eye exam was not found in your medical record. Your hydroxychloroquine prescription has been refilled with a limited supply pending confirmation you have had an eye exam to test for hydroxychloroquine toxicity.      We encourage you to bring this letter to your eye exam to discuss the exams that will be performed during your visit. Please request your eye clinic fax or mail a copy of your eye exam report to our clinic indicating that testing was completed for hydroxychloroquine toxicity screening. The exam notes must specifically comment on the potential for hydroxychloroquine toxicity and outline recommended follow-up.    If you have questions about hydroxychloroquine or the information in this letter, please call the clinic at [phone]  or talk to your provider at your next office visit.                        2    Eye Specialist Letter  Dear Eye Specialist,  To ensure safe use of Plaquenil (hydroxychloroquine), we are requesting your assistance in providing the following information. Please return this form to our clinic via mail or fax, or incorporate this information into your visit summary. Your note must indicate whether or not there is evidence of toxicity from Plaquenil use. For questions regarding this form, please call [phone].  Patient Name:   :             Date of Exam:    The following exams were completed during this visit in accordance with the American Academy of Ophthalmology recommendations (2016):  ? 10-2 automated visual field  ? Spectral-domain optical coherence tomography (SD OCT)  ? Multifocal electroretinogram (mfERG)  ? Fundus autofluorescence (FAF)  ? Other (please specify)  Please select from the following:  ? The findings from the above exams are not suggestive of toxicity from Plaquenil (hydroxychloroquine).  ? The findings from the above exams may suggest toxicity from Plaquenil (hydroxychloroquine).  Directly contact the clinic and fax this form.  Please provide additional guidance on whether or not the medication may be continued from your perspective:  Date of next recommended Plaquenil (hydroxychloroquine) screening eye exam:   ? 5 years  ? 1 year  ? 6 months  Other (please specify):   Eye Specialist Name (print):                                                                Date:  Eye Specialist Signature:

## 2022-01-25 RX ORDER — HYDROXYCHLOROQUINE SULFATE 200 MG/1
400 TABLET, FILM COATED ORAL DAILY
Qty: 180 TABLET | Refills: 0 | Status: SHIPPED | OUTPATIENT
Start: 2022-01-25 | End: 2022-05-04

## 2022-01-25 NOTE — TELEPHONE ENCOUNTER
Plaquenil      Last Written Prescription Date:  10/2/121  Last Fill Quantity: 180,   # refills: 0  Last Office Visit: 5/21/21 recommended 4-5 month follow up  Future Office visit: 2/11/22  Last Eye Exam:1/14/21 in media    Routing refill request to provider for review/approval because:  No record of recent eye exam in EPIC - Letter sent today.

## 2022-01-31 ENCOUNTER — MYC MEDICAL ADVICE (OUTPATIENT)
Dept: FAMILY MEDICINE | Facility: CLINIC | Age: 82
End: 2022-01-31
Payer: MEDICARE

## 2022-01-31 DIAGNOSIS — B37.2 CUTANEOUS CANDIDIASIS: Primary | ICD-10-CM

## 2022-02-01 ENCOUNTER — OFFICE VISIT (OUTPATIENT)
Dept: PALLIATIVE MEDICINE | Facility: CLINIC | Age: 82
End: 2022-02-01
Payer: MEDICARE

## 2022-02-01 VITALS — DIASTOLIC BLOOD PRESSURE: 70 MMHG | HEART RATE: 59 BPM | SYSTOLIC BLOOD PRESSURE: 122 MMHG | OXYGEN SATURATION: 97 %

## 2022-02-01 DIAGNOSIS — M70.61 TROCHANTERIC BURSITIS OF RIGHT HIP: Primary | ICD-10-CM

## 2022-02-01 PROCEDURE — 20610 DRAIN/INJ JOINT/BURSA W/O US: CPT | Mod: RT | Performed by: PHYSICAL MEDICINE & REHABILITATION

## 2022-02-01 RX ORDER — BUPIVACAINE HYDROCHLORIDE 5 MG/ML
3 INJECTION, SOLUTION EPIDURAL; INTRACAUDAL ONCE
Status: COMPLETED | OUTPATIENT
Start: 2022-02-01 | End: 2022-02-01

## 2022-02-01 RX ORDER — TRIAMCINOLONE ACETONIDE 40 MG/ML
40 INJECTION, SUSPENSION INTRA-ARTICULAR; INTRAMUSCULAR ONCE
Status: COMPLETED | OUTPATIENT
Start: 2022-02-01 | End: 2022-02-01

## 2022-02-01 RX ADMIN — TRIAMCINOLONE ACETONIDE 40 MG: 40 INJECTION, SUSPENSION INTRA-ARTICULAR; INTRAMUSCULAR at 16:00

## 2022-02-01 RX ADMIN — BUPIVACAINE HYDROCHLORIDE 15 MG: 5 INJECTION, SOLUTION EPIDURAL; INTRACAUDAL at 16:00

## 2022-02-01 ASSESSMENT — PAIN SCALES - GENERAL: PAINLEVEL: MODERATE PAIN (5)

## 2022-02-01 NOTE — TELEPHONE ENCOUNTER
Could you call her and see if she prefers a video visit tomorrow (2 slots open now) or an urgent/emergent derm/skin care clinic referral to see if they can get her in urgently (as I think that will be the better route if the ketoconazole is not working as I'm not sure if there is an easy answer)?  Pended the referral for skin care - could you call and see if they could get her in urgently?  Thank you!  CW

## 2022-02-01 NOTE — PROGRESS NOTES
[facility] - Procedure Note    Date of Service: 2/1/2022  Procedure performed: Right Greater Trochanteric Bursa Injection  Diagnosis: Right Trochanteric Bursitis  : Charly Moore DO  Anesthesia: none      Indications:   Betty Tee is a 81 year old female who is seen for a right GT bursa injeciton.  They have a history of chronic right sided hip pain.  Exam shows pain with palpation of the GT and IT band on the right and they have tried conservative treatment including medications and PT.      Options/alternatives, benefits and risks were discussed with the patient. Questions were answered to her satisfaction and she agrees to proceed. Voluntary informed consent was obtained and signed.     Vitals were reviewed: Yes  Allergies were reviewed:  Yes   Medications were reviewed:  Yes   Pre-procedure pain score: 6-7/10    Procedure:  After getting informed consent, patient was brought into the procedure suite and was placed in a prone position on the procedure table.   A Pause for the Cause was performed.  Patient was prepped and draped in sterile fashion.     The area over the Right trochanter was palpated, and the tender area was identified, corresponding to the area of the trochanteric bursa.  The area was cleaned with Chloroprep.  A 25-gauge, 1.5-inch needle was inserted, aiming towards the trochanter.  When bone was encountered, the needle was slightly drawn.  A solution containing local anesthesic and steroid was injected.  The needle was removed.  Hemostasis was achieved. Bandaids were placed as appropriate.    In total, 3 ml of 0.5% bupivacaine and 40 mgof kenalog was injected.    Hemostasis was achieved, the area was cleaned, and bandaids were placed when appropriate.  The patient tolerated the procedure well, and was taken to the recovery room.    Images were saved to PACS.      Pre-procedure pain score: 7/10  Post-procedure pain score: 2/10     Assessment/Plan:  Betty Tee is a 81 year old female s/p Right greater trochanteric bursa steroid injection today for hip pan.     1. Following today's procedure, the patient was advised to contact the [facility] for any of the following:   Fever, chills, or night sweats   New onset of pain, numbness, or weakness   Any questions/concerns regarding the procedure  If unable to contact the Pain Center, the patient was instructed to go to a local Emergency Room for any complications.   2. The patient should follow-up with the referring provider in 4 weeks for post-procedure evaluation.        Charly Moore DO  [facility]

## 2022-02-01 NOTE — TELEPHONE ENCOUNTER
Called pt -   She can't do video visit today - has another appt   She said this has been going on long enough too that it's ok to wait a little longer    Free Wed or Thurs this week for derm appt  Signed referral that was pended by CW    Called derm office -   They need to look for a spot to add patient in   They will contact pt directly (I sent her a MyChart to inform her of this)  Derm currently scheduled out to May 2022    Lydia HALEY RN

## 2022-02-01 NOTE — PATIENT INSTRUCTIONS
[facility]  Post Procedure Instructions    Today you had:    Right hip bursa injection      Medications used:     bupivicaine   kenolog             Go to the emergency room if you develop any shortness of breath    Monitor the injection sites for signs and symptoms of infection-fever, chills, redness, swelling, warmth, or drainage to areas.    You may have soreness at injection sites for up to 24 hours.    You may apply ice to the painful areas to help minimize the discomfort of the needle pokes.    Do not apply heat to sites for at least 12 hours.    You may use anti-inflammatory medications or Tylenol for pain control if necessary    Pain Clinic phone number during work hours (Monday through Friday 8 am-4:30 pm) at [phone] or the Provider Line after hours at [phone]:

## 2022-02-03 NOTE — PROGRESS NOTES
Assessment & Plan       ICD-10-CM    1. Type 2 diabetes mellitus with hyperglycemia, with long-term current use of insulin (H)  E11.65 **A1C FUTURE 3mo    Z79.4    2. Weight loss  R63.4    3. Essential hypertension with goal blood pressure less than 140/90  I10    4. Stage 3b chronic kidney disease (H)  N18.32    5. Tinea corporis  B35.4    6. Chronic heart failure with preserved ejection fraction (H)  I50.32    7. Dyspnea on exertion  R06.00    8. Follicular bronchiolitis (H)  J42    9. Sjogren's syndrome with lung involvement (H)  M35.02    10. Insomnia due to medical condition  G47.01 traZODone (DESYREL) 50 MG tablet   11. Gastroesophageal reflux disease without esophagitis  K21.9       DM II/lipids/HTN/CKD- DM in good control, with A1C at 6.4 today (6.3 in 12/21), though is having occasional lows, feels they come mostly if she doesn't have a good breakfast (will work on that or consider lowering novolog if she knows she's not going to have a good breakfast).  On insulin (basal and meal) and ozempic.    LDL continues to be low.  Microalb elevated at 50 at last check in 12/21, but A1C, BP in good control.    Weight loss- Pt wonders about weight which is trending down.  Baseline had been around 200 lbs, now ~187 lbs.  Still eating good, balanced meals, but thinks maybe her portions have decreased.  Reviewed timing of ozempic start- 6/19, and dose increased in 10/20 (after which wt went up).  Wt didn't start going down until ~1-6/21, and has continued to go down.  Will continue to monitor.    Groin- sx's had been very bothersome to pt (see 2/22 notes), but it's better/good now.  Has taken to not wearing underwear at night, which has seemed helpful.  Exam of the area looks good today.    Dyspnea sx's/CHF/P. Afib/Follicular bronchiolitis w/ Sjogrens- - dyspnea worsening lately, incremental worsening.  Did see Dr. Rosa in 9/21 for CHF/a.fib f/u, but thinks sx's have worsened since that time.  Unsure if  deconditioning due to lack of activity/winter or if more concerning. She also saw pulmonary for follicular bronchiolitis with Sjogrens in 10/21- also rec 6mo f/u (planned full PFTs).    Rec f/u with her cardiology and pulmonary teams to evaluate soon.    Would call to schedule, and see if they could see her earlier, especially if sx's are worsening further.    Insomnia- trazodone has been helpful, will continue, refills sent.    GERD- increased her pepcid from 10mg 2x/day, increased to 20mg/d 2x/day in Jan, which helped.    Pain clinic- Dr. Moore has been giving her injections.  Last one was in a bursa, not helpful at this point.  Referred to holistic PT person soon- DJD of hip, piriformis and one other issue.  PT can do all areas.        48 minutes spent on the date of the encounter doing chart review, review of outside records, review of test results, interpretation of tests, patient visit and documentation      Blood sugar testing frequency justification:  Frequent hypoglycemia    Return in about 3 months (around 5/8/2022) for Diabetes, Routine Visit/Chronic Cares/Med Check (40 minute visit).    Ilene Tristan MD  [facility]          Subjective   Sister Betty is a 81 year old who presents for the following health issues- regular f/u for multiple issues    History of Present Illness       Diabetes:   She presents for follow up of diabetes.  She is checking home blood glucose three times daily. She checks blood glucose before meals.  Blood glucose is sometimes over 200 and never under 70. She is aware of hypoglycemia symptoms including shakiness, dizziness and weakness. She is concerned about low blood sugar, several less than 70 in the past few weeks. She is having burning in feet, excessive thirst, blurry vision and weight loss. The patient has not had a diabetic eye exam in the last 12 months.           Glucose readings- they've been looking good, except for a few lows.  With the  lows, feels weak and spacy, dizzy.  Glucose levels usually ~69-70 when she feels like that.  Has some OJ, and feels better right away.  Has something in her purse- energy bars.  Pattern - biggest thing is not having a good breakfast.  Tends to have eggs, or sausages, or muffins and fruit.    Groin area- stopped wearing underwear and airing out more at night, so bit better now.  It's there, but better.    Dyspnea sx's- seem worse lately, incrementally, no significant velazquez in symptoms. Going from point A to point B, feels weak and short of breath, feels it in her chest.  Has to stop and get her breath. Stairs are tough.  Gets out of the car, gets to steps, rests  Last saw Dr. Rosa for CHF in 9/21- thinks sx's are worse since that time.  Pt does not endorse leg swelling or palpitations, no weight gain (losing in fact).  Does not that she is very deconditioned.  Had been getting out for weekly grocery store runs, but that has not been the case lately with her roommate ill/injured.  Minimal walking/exercise lately.    Weight loss- weight trending down, wondering if she should be worrying.  She eats good, balanced meals.  Portions may be a bit smaller.  Does nuts in between meals.  No other systemic sx's, no hot flashes/night sweats, no GI sx's, no increase in fatigue.    GERD- increased her pepcid from 10mg 2x/day, increased to 20mg/d 2x/day in Jan, which helped.      Pain clinic- Dr. Moore has been giving her injections.  Last one was in a bursa, not helpful at this point.  Referred to holistic PT person soon- DJD of hip, piriformis and one other issue.  PT can do all.            Hyperlipidemia Follow-Up    Are you regularly taking any medication or supplement to lower your cholesterol?   Yes- Lipitor    Are you having muscle aches or other side effects that you think could be caused by your cholesterol lowering medication?  No    Hypertension Follow-up    Do you check your blood pressure regularly outside of the  "clinic? No     Are you following a low salt diet? Yes    Are your blood pressures ever more than 140 on the top number (systolic) OR more   than 90 on the bottom number (diastolic), for example 140/90? No    BP Readings from Last 2 Encounters:   02/11/22 127/73   02/11/22 (!) 148/67     Hemoglobin A1C POCT (%)   Date Value   06/04/2021 7.1 (H)   03/03/2021 8.0 (H)     Hemoglobin A1C (%)   Date Value   02/11/2022 6.4 (H)   12/09/2021 6.3 (H)     LDL Cholesterol Calculated (mg/dL)   Date Value   12/09/2021 16   10/20/2020 19   01/21/2020 1         How many servings of fruits and vegetables do you eat daily?  2-3    On average, how many sweetened beverages do you drink each day (Examples: soda, juice, sweet tea, etc.  Do NOT count diet or artificially sweetened beverages)?   0    How many days per week do you exercise enough to make your heart beat faster? 3 or less    How many minutes a day do you exercise enough to make your heart beat faster? 9 or less    How many days per week do you miss taking your medication? 0        Review of Systems   Constitutional, HEENT, cardiovascular, pulmonary, gi and gu systems are negative, except as otherwise noted.      Objective    /75   Pulse 55   Temp 97.1  F (36.2  C)   Ht 1.702 m (5' 7\")   Wt 84.2 kg (185 lb 11.2 oz)   SpO2 100%   BMI 29.08 kg/m    Body mass index is 29.08 kg/m .  Physical Exam   GENERAL APPEARANCE: healthy, alert and no distress     EYES: sclera clear, EOMI     RESP: lungs clear to auscultation - no rales, rhonchi or wheezes     CV: regular rates and rhythm, normal S1 S2, no S3 or S4 and no murmur, click or rub      Ext: warm, dry, no edema       Psych: full range affect, normal speech and grooming, judgement and insight intact     Lab on 12/09/2021   Component Date Value Ref Range Status     Uric Acid 12/09/2021 7.4 (A) 2.6 - 6.0 mg/dL Final     ALT 12/09/2021 17  0 - 50 U/L Final     Albumin 12/09/2021 3.2 (A) 3.4 - 5.0 g/dL Final     AST " 12/09/2021 15  0 - 45 U/L Final     Creatinine 12/09/2021 1.05 (A) 0.52 - 1.04 mg/dL Final     GFR Estimate 12/09/2021 50 (A) >60 mL/min/1.73m2 Final    As of July 11, 2021, eGFR is calculated by the CKD-EPI creatinine equation, without race adjustment. eGFR can be influenced by muscle mass, exercise, and diet. The reported eGFR is an estimation only and is only applicable if the renal function is stable.     Erythrocyte Sedimentation Rate 12/09/2021 18  0 - 30 mm/hr Final     CRP Inflammation 12/09/2021 21.2 (A) 0.0 - 8.0 mg/L Final     Cholesterol 12/09/2021 104  <200 mg/dL Final     Triglycerides 12/09/2021 145  <150 mg/dL Final     Direct Measure HDL 12/09/2021 59  >=50 mg/dL Final     LDL Cholesterol Calculated 12/09/2021 16  <=100 mg/dL Final     Non HDL Cholesterol 12/09/2021 45  <130 mg/dL Final     Patient Fasting > 8hrs? 12/09/2021 No   Final     Creatinine Urine mg/dL 12/09/2021 14  mg/dL Final     Albumin Urine mg/L 12/09/2021 7  mg/L Final     Albumin Urine mg/g Cr 12/09/2021 50.00 (A) 0.00 - 25.00 mg/g Cr Final     Hemoglobin A1C 12/09/2021 6.3 (A) 0.0 - 5.6 % Final    Normal <5.7%   Prediabetes 5.7-6.4%    Diabetes 6.5% or higher     Note: Adopted from ADA consensus guidelines.     TSH 12/09/2021 2.22  0.40 - 4.00 mU/L Final     WBC Count 12/09/2021 7.1  4.0 - 11.0 10e3/uL Final     RBC Count 12/09/2021 4.21  3.80 - 5.20 10e6/uL Final     Hemoglobin 12/09/2021 13.1  11.7 - 15.7 g/dL Final     Hematocrit 12/09/2021 39.6  35.0 - 47.0 % Final     MCV 12/09/2021 94  78 - 100 fL Final     MCH 12/09/2021 31.1  26.5 - 33.0 pg Final     MCHC 12/09/2021 33.1  31.5 - 36.5 g/dL Final     RDW 12/09/2021 13.5  10.0 - 15.0 % Final     Platelet Count 12/09/2021 213  150 - 450 10e3/uL Final     % Neutrophils 12/09/2021 68  % Final     % Lymphocytes 12/09/2021 16  % Final     % Monocytes 12/09/2021 13  % Final     % Eosinophils 12/09/2021 3  % Final     % Basophils 12/09/2021 0  % Final     % Immature Granulocytes  12/09/2021 0  % Final     NRBCs per 100 WBC 12/09/2021 0  <1 /100 Final     Absolute Neutrophils 12/09/2021 4.8  1.6 - 8.3 10e3/uL Final     Absolute Lymphocytes 12/09/2021 1.1  0.8 - 5.3 10e3/uL Final     Absolute Monocytes 12/09/2021 0.9  0.0 - 1.3 10e3/uL Final     Absolute Eosinophils 12/09/2021 0.2  0.0 - 0.7 10e3/uL Final     Absolute Basophils 12/09/2021 0.0  0.0 - 0.2 10e3/uL Final     Absolute Immature Granulocytes 12/09/2021 0.0  <=0.4 10e3/uL Final     Absolute NRBCs 12/09/2021 0.0  10e3/uL Final

## 2022-02-07 ENCOUNTER — TELEPHONE (OUTPATIENT)
Dept: FAMILY MEDICINE | Facility: CLINIC | Age: 82
End: 2022-02-07
Payer: MEDICARE

## 2022-02-07 DIAGNOSIS — G89.29 OTHER CHRONIC PAIN: ICD-10-CM

## 2022-02-07 DIAGNOSIS — M16.11 OSTEOARTHRITIS OF RIGHT HIP, UNSPECIFIED OSTEOARTHRITIS TYPE: ICD-10-CM

## 2022-02-07 RX ORDER — HYDROCODONE BITARTRATE AND ACETAMINOPHEN 7.5; 325 MG/1; MG/1
1 TABLET ORAL EVERY 12 HOURS
Qty: 60 TABLET | Refills: 0 | Status: SHIPPED | OUTPATIENT
Start: 2022-02-07 | End: 2022-03-04

## 2022-02-07 NOTE — TELEPHONE ENCOUNTER
Routing to provider to review medication prepped per below      Hydrocodone 7.5-325, #60, Refill:no  Sig:OK to start and fill 02/07/22  Last picked up 01/07/22 with start on 01/07/22  Due-now    Per last OV note 01/11/22:  1. Medication Management:   1. Continue Norco 7.5-325mg BID prn.

## 2022-02-07 NOTE — TELEPHONE ENCOUNTER
Signed Prescriptions:                        Disp   Refills    HYDROcodone-acetaminophen (NORCO) 7.5-325 *60 tab*0        Sig: Take 1 tablet by mouth every 12 hours OK to start and           fill 02/07/22  Authorizing Provider: JONG HSU      Reviewed [place] Prescription Monitoring Program, appears appropriate.     LIZY Bowles [facility]

## 2022-02-07 NOTE — TELEPHONE ENCOUNTER
Sabrina  Pt calling wanting to know if she can see you tomorrow 2/7 around the time of CW's visit 12:40 instead of see you in VV at 4:00.  Please advise.  Thanks,  Radha Otero RN    Routing on high for apt timing/call back.

## 2022-02-07 NOTE — TELEPHONE ENCOUNTER
Reason for call:  Medication   If this is a refill request, has the caller requested the refill from the pharmacy already? No  Will the patient be using a [facility]? No  Name of the pharmacy and phone number for the current request: [facility]/PHARMACY #[id number] - [place], [place] - [address]     Name of the medication requested: HYDROcodone-acetaminophen (NORCO) 7.5-325 MG per tablet     Phone number to reach patient:  Home number on file [phone] (home)     Can we leave a detailed message on this number?  YES     Travel screening: Not Applicable

## 2022-02-07 NOTE — TELEPHONE ENCOUNTER
Pt calling to check the status of refill. Per pt she went to the pharmacy and her medication was not there.      Jovanni Arora    [facility] [place]

## 2022-02-07 NOTE — TELEPHONE ENCOUNTER
Received call from patient requesting refill(s) of HYDROcodone-acetaminophen (NORCO) 7.5-325 MG per tablet    Last dispensed from pharmacy on 01/07/22    Patient's last office/virtual visit by prescribing provider on 01/11/22  Next office/virtual appointment scheduled for 02/25/22    Last urine drug screen date 09/02/21  Current opioid agreement on file (completed within the last year) Yes Date of opioid agreement: 10/04/21    E-prescribe to pharmacy-[facility]/pharmacy #[id number] - [place], [place] - [address]     Will route to [facility] for review and preparation of prescription(s).

## 2022-02-08 ENCOUNTER — OFFICE VISIT (OUTPATIENT)
Dept: FAMILY MEDICINE | Facility: CLINIC | Age: 82
End: 2022-02-08
Payer: MEDICARE

## 2022-02-08 ENCOUNTER — VIRTUAL VISIT (OUTPATIENT)
Dept: PHARMACY | Facility: CLINIC | Age: 82
End: 2022-02-08
Payer: COMMERCIAL

## 2022-02-08 VITALS
DIASTOLIC BLOOD PRESSURE: 75 MMHG | TEMPERATURE: 97.1 F | HEART RATE: 55 BPM | BODY MASS INDEX: 29.15 KG/M2 | WEIGHT: 185.7 LBS | OXYGEN SATURATION: 100 % | SYSTOLIC BLOOD PRESSURE: 130 MMHG | HEIGHT: 67 IN

## 2022-02-08 DIAGNOSIS — I50.32 CHRONIC HEART FAILURE WITH PRESERVED EJECTION FRACTION (H): ICD-10-CM

## 2022-02-08 DIAGNOSIS — F39 MOOD DISORDER (H): ICD-10-CM

## 2022-02-08 DIAGNOSIS — G47.00 INSOMNIA, UNSPECIFIED TYPE: ICD-10-CM

## 2022-02-08 DIAGNOSIS — J84.9 ILD (INTERSTITIAL LUNG DISEASE) (H): ICD-10-CM

## 2022-02-08 DIAGNOSIS — I10 ESSENTIAL HYPERTENSION WITH GOAL BLOOD PRESSURE LESS THAN 140/90: ICD-10-CM

## 2022-02-08 DIAGNOSIS — R06.09 DYSPNEA ON EXERTION: ICD-10-CM

## 2022-02-08 DIAGNOSIS — Z78.9 TAKES DIETARY SUPPLEMENTS: ICD-10-CM

## 2022-02-08 DIAGNOSIS — E11.65 TYPE 2 DIABETES MELLITUS WITH HYPERGLYCEMIA, WITH LONG-TERM CURRENT USE OF INSULIN (H): Primary | ICD-10-CM

## 2022-02-08 DIAGNOSIS — B00.1 COLD SORE: ICD-10-CM

## 2022-02-08 DIAGNOSIS — Z79.4 TYPE 2 DIABETES MELLITUS WITH OTHER SPECIFIED COMPLICATION, WITH LONG-TERM CURRENT USE OF INSULIN (H): ICD-10-CM

## 2022-02-08 DIAGNOSIS — J30.2 SEASONAL ALLERGIC RHINITIS, UNSPECIFIED TRIGGER: ICD-10-CM

## 2022-02-08 DIAGNOSIS — E11.65 TYPE 2 DIABETES MELLITUS WITH HYPERGLYCEMIA, WITH LONG-TERM CURRENT USE OF INSULIN (H): ICD-10-CM

## 2022-02-08 DIAGNOSIS — Z79.4 TYPE 2 DIABETES MELLITUS WITH HYPERGLYCEMIA, WITH LONG-TERM CURRENT USE OF INSULIN (H): Primary | ICD-10-CM

## 2022-02-08 DIAGNOSIS — N18.32 STAGE 3B CHRONIC KIDNEY DISEASE (H): ICD-10-CM

## 2022-02-08 DIAGNOSIS — B35.4 TINEA CORPORIS: ICD-10-CM

## 2022-02-08 DIAGNOSIS — E11.69 TYPE 2 DIABETES MELLITUS WITH OTHER SPECIFIED COMPLICATION, WITH LONG-TERM CURRENT USE OF INSULIN (H): ICD-10-CM

## 2022-02-08 DIAGNOSIS — K21.9 GASTROESOPHAGEAL REFLUX DISEASE WITHOUT ESOPHAGITIS: ICD-10-CM

## 2022-02-08 DIAGNOSIS — Z79.4 TYPE 2 DIABETES MELLITUS WITH HYPERGLYCEMIA, WITH LONG-TERM CURRENT USE OF INSULIN (H): ICD-10-CM

## 2022-02-08 DIAGNOSIS — M35.02 SJOGREN'S SYNDROME WITH LUNG INVOLVEMENT (H): ICD-10-CM

## 2022-02-08 DIAGNOSIS — J44.89 FOLLICULAR BRONCHIOLITIS (H): ICD-10-CM

## 2022-02-08 DIAGNOSIS — M81.0 OSTEOPOROSIS, UNSPECIFIED OSTEOPOROSIS TYPE, UNSPECIFIED PATHOLOGICAL FRACTURE PRESENCE: ICD-10-CM

## 2022-02-08 DIAGNOSIS — E11.69 TYPE 2 DIABETES MELLITUS WITH OTHER SPECIFIED COMPLICATION (H): Primary | ICD-10-CM

## 2022-02-08 DIAGNOSIS — G47.01 INSOMNIA DUE TO MEDICAL CONDITION: ICD-10-CM

## 2022-02-08 DIAGNOSIS — R63.4 WEIGHT LOSS: ICD-10-CM

## 2022-02-08 DIAGNOSIS — I50.9 ACUTE ON CHRONIC HEART FAILURE, UNSPECIFIED HEART FAILURE TYPE (H): ICD-10-CM

## 2022-02-08 DIAGNOSIS — R52 PAIN: ICD-10-CM

## 2022-02-08 DIAGNOSIS — M19.90 INFLAMMATORY ARTHRITIS: ICD-10-CM

## 2022-02-08 DIAGNOSIS — E78.5 HYPERLIPIDEMIA LDL GOAL <100: ICD-10-CM

## 2022-02-08 PROCEDURE — 99607 MTMS BY PHARM ADDL 15 MIN: CPT | Performed by: PHARMACIST

## 2022-02-08 PROCEDURE — 99605 MTMS BY PHARM NP 15 MIN: CPT | Performed by: PHARMACIST

## 2022-02-08 PROCEDURE — 99215 OFFICE O/P EST HI 40 MIN: CPT | Performed by: FAMILY MEDICINE

## 2022-02-08 RX ORDER — TRAZODONE HYDROCHLORIDE 50 MG/1
25-75 TABLET, FILM COATED ORAL
Qty: 135 TABLET | Refills: 1 | Status: SHIPPED | OUTPATIENT
Start: 2022-02-08 | End: 2022-05-24

## 2022-02-08 ASSESSMENT — MIFFLIN-ST. JEOR: SCORE: 1339.96

## 2022-02-08 NOTE — PROGRESS NOTES
Medication Therapy Management (MTM) Encounter    ASSESSMENT:                            Medication Adherence/Access: No issues identified    Type 2 Diabetes: Stable. Patient is meeting A1c goal of < 7%. Self monitoring of blood glucose is at goal of fasting  mg/dL. Due for annual foot exam. Patient would benefit from not injecting Novolog if they are skipping a meal (she almost never does this). Otherwise she is injecting Novolog correctly.     Back/Leg pain: Stable. Patient would benefit from not using methocarbamol if she does not need it.      Gout: Stable.    Allergies: Stable. In the future, she could consider discontinuing loratadine to help reduce pill burden and to determine necessity.     Heartburn: Stable.     HF/Hypertension: Stable. Patient is meeting blood pressure goal of < 130/80mmHg.     Hyperlipidemia: Stable.  Patient is on moderate intensity statin which is indicated based on 2019 ACC/AHA guidelines for lipid management.      RA: Stable.     Osteoporosis: Stable.     Mood: Stable.     Insomnia: Stable. In the future, could consider lowering trazodone dose or discontinuing trazodone to help reduce pill burden. Patient may benefit from evaluation of CPAP mask size to help prevent swallowing of air.     Interstitial Lung Disease: Stable.     Supplements: Stable.     Cold Sores: Stable.     PLAN:                            1. If you skip a meal, skip the dose of Novolog that you would normally inject to help prevent low blood sugars. Continue to take Novolog about 15 minutes prior to eating.   2. Consider having your CPAP mask sized again to ensure you are using the right size.   3. Ozempic refilled.     Follow-up: 6 months, sooner if needed.    SUBJECTIVE/OBJECTIVE:                          Betty Tee is a 81 year old female contacted via secure video for a follow-up visit. She was referred to me from Dr. Tristan.  Today's visit is a follow-up MTM visit from 05/25/2021.   Reason for visit:  Follow-up visit, patient wants clarification on when she should take Novolog versus when she should not.    Allergies/ADRs: Reviewed in chart  Past Medical History: Reviewed in chart  Tobacco: She reports that she quit smoking about 10 years ago. Her smoking use included cigarettes. She has a 5.00 pack-year smoking history. She has never used smokeless tobacco.    Medication Adherence/Access: no issues reported  Patient requests a refill of Ozempic.     Type 2 Diabetes:  Currently taking Basaglar 22 units daily, Novolog 11 units three times daily (with meals), Ozempic 1 mg every 7 days. Patient is not experiencing side effects.  Blood sugar monitoring: 3 time(s) daily before she eats. Ranges (patient reported):   AM (fasting): ~120 mg/dL  Noon: A little higher than AM   Supper: A little higher than AM  She reports maybe one number over 200 mg/dL every 3 weeks or so.   Symptoms of low blood sugar? shaky, dizzy, weak, sweaty, blurred vision, Frequency of lows- Patient reports lows occur in late afternoon prior to dinner or in the morning if she doesn't eat breakfast fast enough. These occur once every two to three weeks.   Eye exam: Up to date  Foot exam: due  Diet/Exercise: Patient reports some weight loss recently.   Aspirin: Not taking due to current rivaroxaban use.   Statin: Yes: atorvastatin 5 mg   ACEi/ARB: No - stopped due to low BP by cardiology in 2019.  Urine albumin remains elevated at 50 at last check, but is actually improved from previous measurements over 100.      Back/Leg pain: Followed by Dr. Moore for this. She has prescriptions for Norco 7.5/325mg every 12 hours and methocarbamol 750 mg three times daily as needed. She uses methocarbamol infrequently as needed for spasm. Also lidocaine 5% patches, Tylenol 1000 mg every 8 hours as needed. Previously: gabapentin (did not work for her).     Gout: Has Anakinra for use if she should have a flare (no use). She's also on prednisone for gout/RA. Symptoms:  none.      Allergies: She is currently taking flonase and loratidine. She reports no issues.    Heartburn: currently taking famotidine 20 mg daily. Symptoms/adverse effects: none.    HF/Hypertension: Current medications include: metoprolol succinate 62.5 mg twice daily, torsemide 40 mg in the AM and 20 mg in the afternoon, spironolactone 50 mg daily. Patient reports no current medication side effects. No swelling in legs or feet noted.     Hyperlipidemia: Current therapy includes atorvastatin 5mg daily.  Patient reports no significant myalgias or other side effects.    RA: Current medications include hydroxychloroquine 400 mg daily. Patient reports no issues. She recently had an annual eye exam completed.     Osteoporosis: Alendronate 70 mg every 7 days. Patient reports no issues, notes she does not like taking it on Mondays.     Mood: Current medications include escitalopram 20 mg daily. Patient reports some COVID fatigue that has been tough.     Insomnia: Current medications include trazodone 25-75 mg at bedtime as needed. Patient reports she is sleeping well. She reports not waking up in the middle of the night with SOB, but she notes that with her CPAP she may be swallowing some air when sleeping and notes pressure from this.      Interstitial Lung Disease: Current medications include prednisone 5 mg daily, azithromycin 250 mg daily, montelukast 10 mg daily at bedtime, Breo 100-25 mcg 1 puff daily, albuterol HFA as needed. Patient reports feeling very winded from going from point A to point B. She wonders if it is her being out of shape, heart failure, or ILD. She has not been using her albuterol very often lately.     Supplements: Current supplements include vitamin D3 50 mcg daily, Klor-Con 40 mEq in the AM and 20 mEq in the PM. Patient reports no issues.     Cold Sores: Uses acyclovir cream and acyclovir tablets as needed for cold sores. She finds these effective, no issues reported.   ---------------    I  spent 30 minutes with this patient today. All changes were made via collaborative practice agreement with Dr. Tristan. A copy of the visit note was provided to the patient's provider(s).    The patient was sent via [employer] a summary of these recommendations.     Santi Gilmore, PharmD  Amb[facility] Care Pharmacy Resident    Marcos NicoleD, JAMES, BCACP  [place] Pharmacist, [facility]      Telemedicine Visit Details  Type of service:  Video Conference via [employer]  Start Time: 4:18  End Time: 4:48 PM  Originating Location (patient location): Home  Distant Location (provider location):  [facility]     Medication Therapy Recommendations  No medication therapy recommendations to display

## 2022-02-08 NOTE — TELEPHONE ENCOUNTER
[employer] message sent with Rx approval from provider.  Belén  Pain Clinic Management Team

## 2022-02-08 NOTE — PATIENT INSTRUCTIONS
Recommendations from today's MTM visit:                                                    MTM (medication therapy management) is a service provided by a clinical pharmacist designed to help you get the most of out of your medicines.   Today we reviewed what your medicines are for, how to know if they are working, that your medicines are safe and how to make your medicine regimen as easy as possible.      1. If you skip a meal, skip the dose of Novolog that you would normally inject to help prevent low blood sugars.     2. Consider having your CPAP mask sized again to ensure you are using the right size.     Follow-up: 6 months, sooner if needed.    It was great to speak with you today.  I value your experience and would be very thankful for your time with providing feedback on our clinic survey. You may receive a survey via email or text message in the next few days.     To schedule another MTM appointment, please call the clinic directly or you may call the MTM scheduling line at [phone] or toll-free at [phone].     My Clinical Pharmacist's contact information:                                                      Please feel free to contact me with any questions or concerns you have.      Santi Gilmore, PharmD  Ambulatory Care Pharmacy Resident    Sabrina Meek, Pharm.D., M.B.A., [facility] Services[facility]  MTM Pharmacist, [facility]  Pager: [phone]  Email: [email or web address]

## 2022-02-10 ENCOUNTER — THERAPY VISIT (OUTPATIENT)
Dept: PHYSICAL THERAPY | Facility: CLINIC | Age: 82
End: 2022-02-10
Payer: MEDICARE

## 2022-02-10 DIAGNOSIS — M25.551 HIP PAIN, RIGHT: ICD-10-CM

## 2022-02-10 DIAGNOSIS — M70.61 TROCHANTERIC BURSITIS OF RIGHT HIP: ICD-10-CM

## 2022-02-10 DIAGNOSIS — M16.11 OSTEOARTHRITIS OF RIGHT HIP, UNSPECIFIED OSTEOARTHRITIS TYPE: ICD-10-CM

## 2022-02-10 DIAGNOSIS — G57.01 PIRIFORMIS SYNDROME, RIGHT: ICD-10-CM

## 2022-02-10 PROCEDURE — 97161 PT EVAL LOW COMPLEX 20 MIN: CPT | Mod: GP | Performed by: PHYSICAL THERAPIST

## 2022-02-10 PROCEDURE — 97110 THERAPEUTIC EXERCISES: CPT | Mod: GP | Performed by: PHYSICAL THERAPIST

## 2022-02-10 PROCEDURE — 97112 NEUROMUSCULAR REEDUCATION: CPT | Mod: GP | Performed by: PHYSICAL THERAPIST

## 2022-02-10 RX ORDER — SEMAGLUTIDE 1.34 MG/ML
1 INJECTION, SOLUTION SUBCUTANEOUS WEEKLY
Qty: 9 ML | Refills: 1 | Status: SHIPPED | OUTPATIENT
Start: 2022-02-10 | End: 2022-08-11

## 2022-02-10 RX ORDER — INSULIN ASPART 100 [IU]/ML
11 INJECTION, SOLUTION INTRAVENOUS; SUBCUTANEOUS
Qty: 15 ML | Refills: 2
Start: 2022-02-10 | End: 2022-05-04

## 2022-02-11 ENCOUNTER — OFFICE VISIT (OUTPATIENT)
Dept: RHEUMATOLOGY | Facility: CLINIC | Age: 82
End: 2022-02-11
Attending: INTERNAL MEDICINE
Payer: MEDICARE

## 2022-02-11 ENCOUNTER — OFFICE VISIT (OUTPATIENT)
Dept: CARDIOLOGY | Facility: CLINIC | Age: 82
End: 2022-02-11
Attending: INTERNAL MEDICINE
Payer: MEDICARE

## 2022-02-11 ENCOUNTER — LAB (OUTPATIENT)
Dept: LAB | Facility: CLINIC | Age: 82
End: 2022-02-11
Attending: INTERNAL MEDICINE
Payer: MEDICARE

## 2022-02-11 VITALS
WEIGHT: 188 LBS | OXYGEN SATURATION: 99 % | BODY MASS INDEX: 29.44 KG/M2 | HEART RATE: 63 BPM | SYSTOLIC BLOOD PRESSURE: 148 MMHG | DIASTOLIC BLOOD PRESSURE: 67 MMHG

## 2022-02-11 VITALS
BODY MASS INDEX: 29.01 KG/M2 | DIASTOLIC BLOOD PRESSURE: 73 MMHG | OXYGEN SATURATION: 98 % | SYSTOLIC BLOOD PRESSURE: 127 MMHG | HEART RATE: 53 BPM | WEIGHT: 185.2 LBS

## 2022-02-11 DIAGNOSIS — M35.3 POLYMYALGIA RHEUMATICA (H): ICD-10-CM

## 2022-02-11 DIAGNOSIS — M10.9 ACUTE GOUT OF LEFT FOOT, UNSPECIFIED CAUSE: ICD-10-CM

## 2022-02-11 DIAGNOSIS — M16.11 OSTEOARTHRITIS OF RIGHT HIP, UNSPECIFIED OSTEOARTHRITIS TYPE: ICD-10-CM

## 2022-02-11 DIAGNOSIS — I50.32 CHRONIC HEART FAILURE WITH PRESERVED EJECTION FRACTION (H): Primary | ICD-10-CM

## 2022-02-11 DIAGNOSIS — R00.1 BRADYCARDIA: Primary | ICD-10-CM

## 2022-02-11 DIAGNOSIS — D64.9 ANEMIA, UNSPECIFIED TYPE: ICD-10-CM

## 2022-02-11 DIAGNOSIS — I48.21 PERMANENT ATRIAL FIBRILLATION (H): ICD-10-CM

## 2022-02-11 DIAGNOSIS — I50.32 CHRONIC HEART FAILURE WITH PRESERVED EJECTION FRACTION (H): ICD-10-CM

## 2022-02-11 DIAGNOSIS — M06.00 SERONEGATIVE RHEUMATOID ARTHRITIS (H): ICD-10-CM

## 2022-02-11 DIAGNOSIS — I50.30 HEART FAILURE WITH PRESERVED EJECTION FRACTION, UNSPECIFIED HF CHRONICITY (H): ICD-10-CM

## 2022-02-11 DIAGNOSIS — Z79.4 TYPE 2 DIABETES MELLITUS WITH HYPERGLYCEMIA, WITH LONG-TERM CURRENT USE OF INSULIN (H): ICD-10-CM

## 2022-02-11 DIAGNOSIS — M16.11 OSTEOARTHRITIS OF RIGHT HIP, UNSPECIFIED OSTEOARTHRITIS TYPE: Primary | ICD-10-CM

## 2022-02-11 DIAGNOSIS — E11.65 TYPE 2 DIABETES MELLITUS WITH HYPERGLYCEMIA, WITH LONG-TERM CURRENT USE OF INSULIN (H): ICD-10-CM

## 2022-02-11 PROBLEM — M25.551 HIP PAIN, RIGHT: Status: ACTIVE | Noted: 2022-02-11

## 2022-02-11 LAB
ALBUMIN SERPL-MCNC: 3.3 G/DL (ref 3.4–5)
ALT SERPL W P-5'-P-CCNC: 17 U/L (ref 0–50)
AST SERPL W P-5'-P-CCNC: 15 U/L (ref 0–45)
BASOPHILS # BLD AUTO: 0.1 10E3/UL (ref 0–0.2)
BASOPHILS NFR BLD AUTO: 1 %
CREAT SERPL-MCNC: 1 MG/DL (ref 0.52–1.04)
CRP SERPL-MCNC: 12.5 MG/L (ref 0–8)
EOSINOPHIL # BLD AUTO: 0.2 10E3/UL (ref 0–0.7)
EOSINOPHIL NFR BLD AUTO: 2 %
ERYTHROCYTE [DISTWIDTH] IN BLOOD BY AUTOMATED COUNT: 15.1 % (ref 10–15)
ERYTHROCYTE [SEDIMENTATION RATE] IN BLOOD BY WESTERGREN METHOD: 36 MM/HR (ref 0–30)
GFR SERPL CREATININE-BSD FRML MDRD: 56 ML/MIN/1.73M2
HBA1C MFR BLD: 6.4 % (ref 0–5.6)
HCT VFR BLD AUTO: 30.2 % (ref 35–47)
HGB BLD-MCNC: 9.1 G/DL (ref 11.7–15.7)
IMM GRANULOCYTES # BLD: 0.1 10E3/UL
IMM GRANULOCYTES NFR BLD: 1 %
LYMPHOCYTES # BLD AUTO: 1 10E3/UL (ref 0.8–5.3)
LYMPHOCYTES NFR BLD AUTO: 9 %
MCH RBC QN AUTO: 26.2 PG (ref 26.5–33)
MCHC RBC AUTO-ENTMCNC: 30.1 G/DL (ref 31.5–36.5)
MCV RBC AUTO: 87 FL (ref 78–100)
MONOCYTES # BLD AUTO: 1.2 10E3/UL (ref 0–1.3)
MONOCYTES NFR BLD AUTO: 11 %
NEUTROPHILS # BLD AUTO: 8.9 10E3/UL (ref 1.6–8.3)
NEUTROPHILS NFR BLD AUTO: 76 %
NRBC # BLD AUTO: 0 10E3/UL
NRBC BLD AUTO-RTO: 0 /100
NT-PROBNP SERPL-MCNC: 239 PG/ML (ref 0–450)
PLATELET # BLD AUTO: 283 10E3/UL (ref 150–450)
RBC # BLD AUTO: 3.47 10E6/UL (ref 3.8–5.2)
TOTAL PROTEIN SERUM FOR ELP: 6.9 G/DL (ref 6.8–8.8)
URATE SERPL-MCNC: 6.1 MG/DL (ref 2.6–6)
WBC # BLD AUTO: 11.5 10E3/UL (ref 4–11)

## 2022-02-11 PROCEDURE — 83010 ASSAY OF HAPTOGLOBIN QUANT: CPT | Performed by: INTERNAL MEDICINE

## 2022-02-11 PROCEDURE — 86335 IMMUNFIX E-PHORSIS/URINE/CSF: CPT | Mod: 26 | Performed by: STUDENT IN AN ORGANIZED HEALTH CARE EDUCATION/TRAINING PROGRAM

## 2022-02-11 PROCEDURE — 84165 PROTEIN E-PHORESIS SERUM: CPT | Mod: 26 | Performed by: STUDENT IN AN ORGANIZED HEALTH CARE EDUCATION/TRAINING PROGRAM

## 2022-02-11 PROCEDURE — G0463 HOSPITAL OUTPT CLINIC VISIT: HCPCS

## 2022-02-11 PROCEDURE — 84450 TRANSFERASE (AST) (SGOT): CPT | Performed by: PATHOLOGY

## 2022-02-11 PROCEDURE — 86334 IMMUNOFIX E-PHORESIS SERUM: CPT | Performed by: STUDENT IN AN ORGANIZED HEALTH CARE EDUCATION/TRAINING PROGRAM

## 2022-02-11 PROCEDURE — 84165 PROTEIN E-PHORESIS SERUM: CPT | Mod: TC | Performed by: INTERNAL MEDICINE

## 2022-02-11 PROCEDURE — 85025 COMPLETE CBC W/AUTO DIFF WBC: CPT | Performed by: PATHOLOGY

## 2022-02-11 PROCEDURE — 84155 ASSAY OF PROTEIN SERUM: CPT | Performed by: INTERNAL MEDICINE

## 2022-02-11 PROCEDURE — 84550 ASSAY OF BLOOD/URIC ACID: CPT | Performed by: PATHOLOGY

## 2022-02-11 PROCEDURE — 99214 OFFICE O/P EST MOD 30 MIN: CPT | Performed by: INTERNAL MEDICINE

## 2022-02-11 PROCEDURE — 83880 ASSAY OF NATRIURETIC PEPTIDE: CPT | Performed by: PATHOLOGY

## 2022-02-11 PROCEDURE — 86334 IMMUNOFIX E-PHORESIS SERUM: CPT | Mod: 26 | Performed by: STUDENT IN AN ORGANIZED HEALTH CARE EDUCATION/TRAINING PROGRAM

## 2022-02-11 PROCEDURE — 82728 ASSAY OF FERRITIN: CPT | Performed by: INTERNAL MEDICINE

## 2022-02-11 PROCEDURE — 36415 COLL VENOUS BLD VENIPUNCTURE: CPT | Performed by: PATHOLOGY

## 2022-02-11 PROCEDURE — 85652 RBC SED RATE AUTOMATED: CPT | Performed by: PATHOLOGY

## 2022-02-11 PROCEDURE — 83036 HEMOGLOBIN GLYCOSYLATED A1C: CPT | Performed by: PATHOLOGY

## 2022-02-11 PROCEDURE — 83550 IRON BINDING TEST: CPT | Performed by: INTERNAL MEDICINE

## 2022-02-11 PROCEDURE — 93005 ELECTROCARDIOGRAM TRACING: CPT

## 2022-02-11 PROCEDURE — 84460 ALANINE AMINO (ALT) (SGPT): CPT | Performed by: PATHOLOGY

## 2022-02-11 PROCEDURE — 82565 ASSAY OF CREATININE: CPT | Performed by: PATHOLOGY

## 2022-02-11 PROCEDURE — 82607 VITAMIN B-12: CPT | Performed by: INTERNAL MEDICINE

## 2022-02-11 PROCEDURE — G0463 HOSPITAL OUTPT CLINIC VISIT: HCPCS | Mod: 25

## 2022-02-11 PROCEDURE — 85045 AUTOMATED RETICULOCYTE COUNT: CPT | Performed by: INTERNAL MEDICINE

## 2022-02-11 PROCEDURE — 82040 ASSAY OF SERUM ALBUMIN: CPT | Performed by: PATHOLOGY

## 2022-02-11 PROCEDURE — 86140 C-REACTIVE PROTEIN: CPT | Performed by: PATHOLOGY

## 2022-02-11 PROCEDURE — 99215 OFFICE O/P EST HI 40 MIN: CPT | Performed by: INTERNAL MEDICINE

## 2022-02-11 PROCEDURE — 86335 IMMUNFIX E-PHORSIS/URINE/CSF: CPT | Performed by: STUDENT IN AN ORGANIZED HEALTH CARE EDUCATION/TRAINING PROGRAM

## 2022-02-11 RX ORDER — METOPROLOL SUCCINATE 50 MG/1
50 TABLET, EXTENDED RELEASE ORAL 2 TIMES DAILY
Qty: 90 TABLET | Refills: 3 | Status: SHIPPED | OUTPATIENT
Start: 2022-02-11 | End: 2022-11-10

## 2022-02-11 ASSESSMENT — PAIN SCALES - GENERAL: PAINLEVEL: NO PAIN (0)

## 2022-02-11 NOTE — LETTER
2/11/2022      RE: Betty Tee  [address]  [facility] [address]       Dear Colleague,    Thank you for the opportunity to participate in the care of your patient, Betty Tee, at the [facility] [place] [facility]. Please see a copy of my visit note below.    Heart Failure Cardiology Clinic Note  February 11, 2022      HPI    Dear colleagues,     I had the pleasure of seeing Ms. Betty Tee in the Heart Failure Cardiology clinic.  As you know, Ms. Betty Tee is a pleasant 81 year old female with a past medical history of heart failure with preserved ejection fraction.  Other pertinent history of hypertension, atrial fibrillation, Sjogren's syndrome and interstitial lung disease.  I am seeing her as a urgent visit for increased shortness of breath and dyspnea on exertion.  She follows with my colleague Dr. Radha Rosa.    Patient presents with her friend.  She has 2 main complaints largely revolving around chest discomfort and shortness of breath as well as swallowing difficulties.    She said she has had chest discomfort that has been occurring over the last month or 2.  Occurs only with exertion and feels in the center of her chest like pressure.  Does not occur at rest.  It occurs when she is trying to walk and is associated with some shortness of breath.  She just feels that she is congested as if she has to belch, though this is not associated with any change in her diet, nausea.  It does not radiate anywhere.  She has occasional lightheadedness upon standing but no falls or syncope.  She also has hip discomfort that prevents her from walking as well but really feels that her shortness of breath.  She uses a walker.  The only relief she gets is when resting.  She does not have orthopnea or PND or lower extremity swelling.  She has not had any weight gain, actually some weight loss she says.    Her  swallowing it feels as if foods may get stuck in her esophagus.  She is has not really noticed if it is different with foods versus liquids.  She feels as if she needs to vomit occasionally.  She does not feel as if this is like her usual gastric reflux, and she has increased her Pepcid to twice daily as well.    PAST MEDICAL HISTORY:  Patient Active Problem List   Diagnosis     Temporomandibular joint disorder     Diverticulitis of colon     Disorder of bone and cartilage     Alcohol abuse, in remission     Restless legs syndrome (RLS)     Major depressive disorder, recurrent episode, moderate (H)     Essential hypertension with goal blood pressure less than 140/90     Advanced directives, counseling/discussion     Colouterine fistula     Paroxysmal atrial fibrillation (H)     Left ventricular hypertrophy     Health Care Home     Insomnia     Joint pains     Allergic reaction caused by a drug - likely plaquenil     Breast fibroadenoma     History of deep vein thrombosis (DVT) of lower extremity     Fatty liver disease, nonalcoholic     Rib pain     Neck mass     Gastroesophageal reflux disease without esophagitis     Enlarged lymph node     Sjogren's syndrome with lung involvement (H)     ANGELICA (obstructive sleep apnea)     ILD (interstitial lung disease) (H)     Lower GI bleed     (HFpEF) heart failure with preserved ejection fraction (H)     Type 2 diabetes mellitus with hyperglycemia, with long-term current use of insulin (H)     Heart failure, chronic, with acute decompensation (H)     Hyperlipidemia LDL goal <100     Long term current use of anticoagulant therapy     Inflammatory arthritis     Follicular bronchiolitis (H)     Stage 3b chronic kidney disease (H)     Closed head injury     Urge incontinence of urine     Osteoarthritis of right hip     Hip pain, right        FAMILY HISTORY:  Family History   Problem Relation Age of Onset     C.A.D. Mother 63        MI- first at age 63     Heart Disease Mother       Hypertension Mother      Cerebrovascular Disease Mother      Hyperlipidemia Mother      Coronary Artery Disease Mother         MI-first at age 63     Other - See Comments Mother         cerebrovascular disease      Alcohol/Drug Father      Alzheimer Disease Father      Dementia Father      Hypertension Father      Hyperlipidemia Father      Substance Abuse Father      Diabetes Sister      Hypertension Sister      Hyperlipidemia Sister      Substance Abuse Sister      Asthma Sister      C.A.D. Sister 52        Minor MI- age 50's     Heart Disease Sister      Hypertension Sister      Hypertension Sister      Hypertension Brother      Cancer - colorectal Sister 48        Late 40's early 50's     Prostate Cancer Brother 74        Dx'd age 74     Gastrointestinal Disease Sister         Diverticulitis     Gastrointestinal Disease Brother         Diverticulitis     Lipids Sister      Lipids Sister      Parkinsonism Brother      Diabetes Sister      Heart Disease Sister         CHF     Cancer Sister         lung, smoker     Substance Abuse Sister      Substance Abuse Brother      Asthma Sister      Cancer Sister      Breast Cancer Daughter      Prostate Cancer Brother      Hyperlipidemia Brother      Diabetes Other      Hypertension Other      Coronary Artery Disease Sister         minor MI-age 50's     Heart Disease Sister      Hypertension Sister      Hypertension Brother      Hypertension Sister      Colon Cancer Sister      Prostate Cancer Brother      Diverticulitis Sister      Diverticulitis Brother      Parkinsonism Brother      Lung Cancer Sister      Breast Cancer Daughter      Heart Disease Sister         CHF     Diabetes Sister      Asthma Sister        SOCIAL HISTORY:  Social History     Tobacco Use     Smoking status: Former Smoker     Packs/day: 0.50     Years: 10.00     Pack years: 5.00     Types: Cigarettes     Quit date: 8/1/2011     Years since quitting: 10.5     Smokeless tobacco: Never Used     Tobacco  comment: 1/2 ppd   Substance Use Topics     Alcohol use: No     Alcohol/week: 0.0 standard drinks     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     Drug use: No        ALLERGIES:  Allergies   Allergen Reactions     Amoxicillin-Pot Clavulanate      Augmentin Nausea and Vomiting     Codeine Nausea and Vomiting     Codeine      PN: LW Reaction: HIVES     Penicillins Nausea and Vomiting     PN: LW Reaction: GI Upset     Phenobarbital Itching     Phenobarbital      Seasonal Allergies        CURRENT MEDICATIONS:  Current Outpatient Medications   Medication Sig Dispense Refill     ACCU-CHEK SHANNON PLUS test strip USE TO TEST BLOOD SUGARS 3 TIMES DAILY 300 strip 5     acetaminophen (TYLENOL) 500 MG tablet Take 1,000 mg by mouth every 8 hours as needed (max 6 tablets/24 hours, 2 tablets/dose)        acyclovir (ZOVIRAX) 400 MG tablet Take 1 tablet (400 mg) by mouth 3 times daily For a couple days 15 tablet 2     acyclovir (ZOVIRAX) 5 % external ointment Apply topically 6 times daily As needed for outbreaks 15 g 3     albuterol (PROAIR HFA, PROVENTIL HFA, VENTOLIN HFA) 108 (90 BASE) MCG/ACT inhaler Inhale 2 puffs into the lungs every 6 hours 1 Inhaler 3     alendronate (FOSAMAX) 70 MG tablet Take 1 tablet (70 mg) by mouth every 7 days 12 tablet 2     anakinra (KINERET) 100 MG/0.67ML SOSY injection 100 mg every day x 3 days as needed for flare ups (Patient not taking: Reported on 2/8/2022) 6.7 mL 3     atorvastatin (LIPITOR) 10 MG tablet TAKE 1/2 TABLET BY MOUTH EVERY DAY 45 tablet 2     azithromycin (ZITHROMAX) 250 MG tablet Take 1 tablet (250 mg) by mouth daily 30 tablet 11     BD PEN NEEDLE JANE 2ND GEN 32G X 4 MM miscellaneous USE 4 DAILY AS DIRECTED. 400 each 1     blood glucose (NO BRAND SPECIFIED) lancets standard Use to test blood sugar 3 times daily or as directed 100 lancet 3     cevimeline (EVOXAC) 30 MG capsule Take 1 capsule (30 mg) by mouth 3 times daily (Patient not taking: Reported on 2/8/2022) 270 capsule 2      Cholecalciferol (VITAMIN D3) 50 MCG (2000 UT) CAPS Take 1 tablet (50 mcg) by mouth daily - Oral 90 capsule 2     COMPOUNDED NON-CONTROLLED SUBSTANCE (CMPD RX) - PHARMACY TO MIX COMPOUNDED MEDICATION Estriol 1 mg/g Apply small amount to finger and apply to inside vagina daily for 2 weeks then twice weekly Route: vaginally Dispense 30 grams 11 refills (Patient not taking: Reported on 2/8/2022) 30 g 11     escitalopram (LEXAPRO) 20 MG tablet TAKE 1 TABLET BY MOUTH EVERY DAY 90 tablet 0     famotidine (PEPCID) 10 MG tablet Take 20 mg by mouth 2 times daily        fluticasone (FLONASE) 50 MCG/ACT nasal spray Spray 1-2 sprays into both nostrils daily as needed for allergies 18.2 mL 11     fluticasone-vilanterol (BREO ELLIPTA) 100-25 MCG/INH inhaler Inhale 1 puff into the lungs daily 1 Inhaler 11     HYDROcodone-acetaminophen (NORCO) 7.5-325 MG per tablet Take 1 tablet by mouth every 12 hours OK to start and fill 02/07/22 60 tablet 0     hydroxychloroquine (PLAQUENIL) 200 MG tablet Take 2 tablets (400 mg) by mouth daily Get annual eye exams for hydroxychloroquine (Plaquenil) monitoring and fax to [phone] 180 tablet 0     insulin aspart (NOVOLOG FLEXPEN) 100 UNIT/ML pen Inject 11 Units Subcutaneous 3 times daily (with meals) 15 mL 2     insulin glargine (BASAGLAR KWIKPEN) 100 UNIT/ML pen INJECT 22 UNITS SUBCUTANEOUS DAILY (TO REPLACE LANTUS) 15 mL 0     ketoconazole (NIZORAL) 2 % external cream Apply topically 2 times daily as needed for itching 60 g 1     KLOR-CON 20 MEQ CR tablet TAKE 2 TABLETS (40 MEQ) BY MOUTH EVERY MORNING AND 1 TABLET (20 MEQ) EVERY EVENING. 270 tablet 3     lidocaine (LIDODERM) 5 % patch APPLY PATCH TO PAINFUL AREA FOR UP TO 12 H WITHIN A 24 H PERIOD. REMOVE AFTER 12 HOURS. 30 patch 2     loratadine (CLARITIN) 10 MG tablet TAKE 1 TABLET BY MOUTH EVERY DAY 90 tablet 3     methocarbamol (ROBAXIN) 750 MG tablet Take 1 tablet (750 mg) by mouth 3 times daily as needed for muscle spasms 90 tablet 3      metoprolol succinate ER (TOPROL-XL) 25 MG 24 hr tablet Take 0.5 tablets (12.5 mg) by mouth daily TAKE 1/2 TABLET BY MOUTH 2 TIMES DAILY WITH 50 MG FOR A TOTAL OF 62.5 TWICE A DAY 45 tablet 3     metoprolol succinate ER (TOPROL-XL) 50 MG 24 hr tablet TAKE 50 MG BY MOUTH IN COMBINATION WITH 12.5 FOR A TOTAL OF 62.5 TWICE A DAY 90 tablet 3     montelukast (SINGULAIR) 10 MG tablet TAKE 1 TABLET BY MOUTH EVERYDAY AT BEDTIME 90 tablet 0     predniSONE (DELTASONE) 5 MG tablet Take 1 tablet (5 mg) by mouth daily 30 tablet 0     rivaroxaban ANTICOAGULANT (XARELTO ANTICOAGULANT) 20 MG TABS tablet TAKE 1 TABLET BY MOUTH DAILY WITH DINNER 90 tablet 3     Semaglutide, 1 MG/DOSE, (OZEMPIC, 1 MG/DOSE,) 4 MG/3ML SOPN Inject 1 mg Subcutaneous once a week 9 mL 1     spironolactone (ALDACTONE) 25 MG tablet Take 2 tablets (50 mg) by mouth daily 180 tablet 3     torsemide (DEMADEX) 10 MG tablet TAKE ONE TAB (10 MG) WITH ONE 20 MG TAB TO = 30 MG DAILY IN AFTERNOON. 90 tablet 3     torsemide (DEMADEX) 20 MG tablet TAKE 2 TABLETS (40 MG) BY MOUTH EVERY MORNING AND 1 TABLET (20 MG) DAILY AT 2 PM. TAKE THE AFTERNOON DOSE WITH AN EXTRA 10 MG TAB FOR A TOTAL OF 30 MG IN THE AFTERNOON 270 tablet 3     traZODone (DESYREL) 50 MG tablet Take 0.5-1.5 tablets (25-75 mg) by mouth nightly as needed for sleep 135 tablet 1     EXAM:  /73 (BP Location: Right arm, Patient Position: Chair, Cuff Size: Adult Regular)   Pulse 53   Wt 84 kg (185 lb 3.2 oz)   SpO2 98%   BMI 29.01 kg/m      General: appears comfortable, alert and articulate  Head: normocephalic, atraumatic  Eyes: anicteric sclera, EOMI  Heart: Bradycardic, estimated JVP 8  Lungs: clear, no rales or wheezing  Abdomen: soft, non-tender, bowel sounds present, no hepatosplenomegaly  Extremities: no clubbing, cyanosis or edema  Neurological: normal speech and affect, no gross motor deficits    Weight  Wt Readings from Last 10 Encounters:   02/11/22 85.3 kg (188 lb)   02/08/22 84.2 kg  (185 lb 11.2 oz)   11/16/21 88.1 kg (194 lb 3.2 oz)   10/22/21 89.4 kg (197 lb)   09/14/21 89.9 kg (198 lb 1.6 oz)   09/09/21 87.9 kg (193 lb 12.8 oz)   07/14/21 90.7 kg (200 lb)   06/04/21 90.7 kg (200 lb)   03/03/21 92.5 kg (204 lb)   01/05/21 93.4 kg (206 lb)       Labs:  Reviewed.     CBC RESULTS:  Lab Results   Component Value Date    WBC 11.5 (H) 02/11/2022    WBC 8.6 06/04/2021    RBC 3.47 (L) 02/11/2022    RBC 4.46 06/04/2021    HGB 9.1 (L) 02/11/2022    HGB 13.4 06/04/2021    HCT 30.2 (L) 02/11/2022    HCT 42.1 06/04/2021    MCV 87 02/11/2022    MCV 94 06/04/2021    MCH 26.2 (L) 02/11/2022    MCH 30.0 06/04/2021    MCHC 30.1 (L) 02/11/2022    MCHC 31.8 06/04/2021    RDW 15.1 (H) 02/11/2022    RDW 15.5 (H) 06/04/2021     02/11/2022     06/04/2021       CMP RESULTS:  Lab Results   Component Value Date     11/10/2021     06/04/2021    POTASSIUM 4.2 11/10/2021    POTASSIUM 4.0 06/04/2021    CHLORIDE 108 11/10/2021    CHLORIDE 106 06/04/2021    CO2 28 11/10/2021    CO2 25 06/04/2021    ANIONGAP 6 11/10/2021    ANIONGAP 6 06/04/2021     (H) 11/10/2021     (H) 06/04/2021    BUN 13 11/10/2021    BUN 14 06/04/2021    CR 1.05 (H) 12/09/2021    CR 1.11 (H) 06/04/2021    GFRESTIMATED 50 (L) 12/09/2021    GFRESTIMATED 47 (L) 06/04/2021    GFRESTBLACK 54 (L) 06/04/2021    CLAUDY 9.8 11/10/2021    CLAUDY 8.8 06/04/2021    BILITOTAL 0.6 12/04/2020    ALBUMIN 3.2 (L) 12/09/2021    ALBUMIN 3.5 06/04/2021    ALKPHOS 95 12/04/2020    ALT 17 12/09/2021    ALT 25 06/04/2021    AST 15 12/09/2021    AST 17 06/04/2021        BNP RESULTS:  Lab Results   Component Value Date    NTBNPI 3,531 (H) 04/06/2017       Testing/Procedures:  I personally visualized and interpreted:  Echocardiogram 9/9/2021: LVEF 55 to 60% normal RV size and function no significant valvular abnormalities, dilated IVC  EKG 5/3/2021: Sinus bradycardia  EKG 2/11/2022: Sinus bradycardia, low voltage, no acute ischemia    Assessment  and Plan:     In summary, this is a very pleasant 81-year-old female with heart failure with preserved ejection fraction who presents with increased dyspnea on exertion and chest discomfort.    I think her chest discomfort may represent typical cardiac angina.  Elderly woman with diabetes are the most likely to present with atypical signs of cardiac angina, though I think she has many typical signs.  I did talk to her that the definitive tests would be a stress test and/or an angiogram, though she wishes to refrain from more testing.  I do not think she is having any active infarct but I told her to consider the testing.    She is already on good medical management and her vitals are stable.  I did wonder if she could be having some chronotropic incompetence and that is what is causing her fatigue with exertion.  I did have her walk the hallways with her walker and then the highest her heart rate got was 69 bpm and she was clearly tired although did not express any chest pain.  I did tell her that may be reducing her metoprolol dose which is that 62-1/2 mg twice a day may allow her heart rate to pump faster and she may be less winded when exerting herself.  She want to make very conservative changes thus we may drop to to 50 mg a day.  I did tell her that this may not improve or help the chest discomfort she experiences but she want to try the stuff first.    Regarding her swallowing, I am not completely sure what could be the cause.  Given her age and history of gastric reflux disease I am not sure if she would warrant a barium swallow study versus an EGD.  Again she did not really want any further tests or procedures at the moment.  We will continue conservative management with her Pepcid.  She does have a severely enlarged left atrium and I also mentioned that could be a potential cause of now the elevated left atrial pressure pressing against the esophagus.  She does appear euvolemic on exam.    She will follow  up with our nurse regarding how she is doing and consider the testing as above.    The patient states understanding and is agreeable with plan.   Feel free to contact myself regarding questions or concerns.  It was a pleasure to see this patient today.    Amy Moreno MD   of Medicine  Advanced Heart Failure and Transplant Cardiology      Today's clinic visit entailed:  40 minutes spent on the date of the encounter doing chart review, history and exam, documentation and further activities per the note  Provider  Link to [facility] Help Grid     The level of medical decision making during this visit was of high complexity.

## 2022-02-11 NOTE — PROGRESS NOTES
[facility]    OUTPATIENT Physical Therapy ORTHOPEDIC EVALUATION  PLAN OF TREATMENT FOR OUTPATIENT REHABILITATION  (COMPLETE FOR INITIAL CLAIMS ONLY)  Patient's Last Name, First Name, M.I.  YOB: 1940  Betty Tee    Provider s Name:  [facility]   Medical Record No.  [id number]   Start of Care Date:  02/10/22   Onset Date:   01/11/22 (date of PT order)   Type:     _X__PT   ___OT Medical Diagnosis:    Encounter Diagnoses   Name Primary?     Trochanteric bursitis of right hip      Osteoarthritis of right hip, unspecified osteoarthritis type      Piriformis syndrome, right      Hip pain, right         Treatment Diagnosis:  R hip pain (OA, bursitis)        Goals:     02/11/22 0500   Body Part   Goals listed below are for R hip   Goal #1   Goal #1 ambulation   Previous Functional Level No restrictions   Current Functional Level Minutes patient can walk;with walker   Performance Level 3, pain 9/10   STG Target Performance Minutes patient will be able to walk;with walker   Performance Level 5, pain 7/10   Rationale for safe household ambulation;for safe outdoor household ambulation;for safe community ambulation;to maintain proper body mechanics/posture while ambulating to avoid additional compensatory injury due to improper gait mechanics;to promote a healthy and active lifestyle   Due Date 03/03/22    LTG Target Performance Minutes patient will be able to  walk;with walker   Performance Level 15, pain 4/10   Rationale for safe household ambulation;for safe outdoor household ambulation;for safe community ambulation;to maintain proper body mechanics/posture while ambulating to avoid additional compensatory injury due to improper gait mechanics;to promote a healthy and active lifestyle   Due Date 05/11/22   Goal #2   Goal #2 standing   Previous Functional Level No  restrictions   Current Functional Level Minutes patient can stand  (with symmetry)   Performance level 5, pain 9/10   STG Target Performance Minutes patient will be able to stand   Performance level 10, pain 7/10   Rationale for housekeeping tasks such as vacuuming, bed making, mowing, gardening;for personal hygiene;for meal preparation;for safe household ambulation   Due date 03/03/22   LTG Target Performance Minutes patient will be able to stand   Performance Level 15, pain 4/10   Rationale for housekeeping tasks such as vacuuming, bed making, mowing;for personal hygiene;for meal preparation;for safe household ambulation   Due date 08/11/22       Therapy Frequency:  2x/months x 6 months  Predicted Duration of Therapy Intervention:  6 months    William Montgomery, PT                 I CERTIFY THE NEED FOR THESE SERVICES FURNISHED UNDER        THIS PLAN OF TREATMENT AND WHILE UNDER MY CARE     (Physician attestation of this document indicates review and certification of the therapy plan).                       Certification Date From:  02/10/22   Certification Date To:  05/11/22    Referring Provider:  Charly Wheeler*    Initial Assessment        See Epic Evaluation SOC Date: 02/10/22

## 2022-02-11 NOTE — PROGRESS NOTES
Physical Therapy Initial Evaluation  Subjective:  The history is provided by the patient. No  was used.   Patient Health History  Betty Tee being seen for R hip, piriformis pain.     Problem began: 2/1/2022.   Problem occurred: insidious   Pain is reported as 9/10 on pain scale.    Health conditions: Sjogrens with lung involvement, restless leg, CKD.   Red flags:  None as reported by patient.  Medical allergies: seasonal allergies.   Surgeries include:  Other.    Current medications:  Pain medication.    Current occupation is Retired.                     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 Right sided hip and leg pain: Patient describes a long standing history of right hip and leg pain. They've been having worsening right lateral hip and groin pain in the last 2-3 months. They also have a long standing history of pain that starts in her right low back/upper buttock and radiates laterally and posteriorly down the right leg to the foot. They had a positive salomón and hip scour and significant right GT bursa tenderness on exam today. Based on this it appears her symptoms are due to a combination of lumbar spondylosis/radiculopathy, piriformis syndrome, Right hip severe OA and right hip bursitis. .         Type of problem:  Right hip.    This is a chronic condition.  Condition occurred with:  Insidious onset.  Where condition occurred: for unknown reasons.  Patient reports pain:  Greater trochanter, anterior, joint and lateral.  Pain is described as aching and sharp and is constant.  Pain radiates to:  No radiation. Pain is the same all the  "time.  Since onset symptoms are unchanged.  Symptoms are exacerbated by activity, sitting, bending/squatting, transfers, certain positions and weight bearing  and relieved by nothing.  Special tests included:  MRI.  Previous treatment includes physical therapy. There was none improvement following previous treatment.  Work activity restrictions: Retired.  Barriers include:  None as reported by patient.                        Objective:    Gait:    Gait Type:  Antalgic   Assistive Devices:  Walker  Deviations:  Hip:  Decr dynamic control RKnee:  Knee flexion decr RAnkle:  Push off decr R    Flexibility/Screens:       Lower Extremity:      Decreased right lower extremity flexibility:  Hamstrings                                                 Hip Evaluation  HIP AROM:    Flexion: Left:   Right:  100    Extension: Left:   Right:  5      Internal Rotation: Left:   Right: too painful  External Rotation: Left:   Right: too painful        Hip Strength:    Flexion:   Right: 4-/5   +  Pain:                      Abduction:  Right: 4-/5   +   Pain:                                     General Evaluation:  AROM:        Lumbopelvic:  Significant findings: Lumbar flex min loss, ext mod loss        Gross Strength:              Lower Extremity:   Significant findings:  Able to heel raise, toe raise. Unable to heel walk, toe walk.                  Balance:    Single Leg Stance--Eyes Open:  Left: 2\" with light UE assist/30 sec    Right: unable due to pain/30 sec                                                     ROS    Assessment/Plan:    Patient is a 81 year old female with right side hip complaints.    Patient has the following significant findings with corresponding treatment plan.                Diagnosis 1:  R hip pain  Pain -  self management, education and home program  Decreased ROM/flexibility - manual therapy, therapeutic exercise and home program  Decreased strength - therapeutic exercise, therapeutic activities and home " program  Decreased proprioception - neuro re-education, therapeutic activities and home program  Impaired gait - gait training and home program  Impaired muscle performance - neuro re-education and home program  Decreased function - therapeutic activities and home program  Impaired posture - neuro re-education and home program    Therapy Evaluation Codes:   1) History comprised of:   Personal factors that impact the plan of care:      Age, Past/current experiences and Time since onset of symptoms.    Comorbidity factors that impact the plan of care are:      Osteoarthritis and Sjogrens syndrome with lung involvement.     Medications impacting care: Pain.  2) Examination of Body Systems comprised of:   Body structures and functions that impact the plan of care:      Hip and Lumbar spine.   Activity limitations that impact the plan of care are:      Bending, Dressing, Sitting, Squatting/kneeling, Standing and Walking.  3) Clinical presentation characteristics are:   Stable/Uncomplicated.  4) Decision-Making    Low complexity using standardized patient assessment instrument and/or measureable assessment of functional outcome.  Cumulative Therapy Evaluation is: Low complexity.    Previous and current functional limitations:  (See Goal Flow Sheet for this information)    Short term and Long term goals: (See Goal Flow Sheet for this information)     Communication ability:  Patient appears to be able to clearly communicate and understand verbal and written communication and follow directions correctly.  Treatment Explanation - The following has been discussed with the patient:   RX ordered/plan of care  Anticipated outcomes  Possible risks and side effects  This patient would benefit from PT intervention to resume normal activities.   Rehab potential is good.    Frequency:  2 X a month, once daily  Duration:  for 6 months  Discharge Plan:  Achieve all LTG.  Independent in home treatment program.  Reach maximal therapeutic  benefit.    Please refer to the daily flowsheet for treatment today, total treatment time and time spent performing 1:1 timed codes.

## 2022-02-11 NOTE — LETTER
2022       RE: Betty Tee  [address]  [facility] [address]     Dear Colleague,    Thank you for referring your patient, Betty Tee, to the [facility] RHEUMATOLOGY [facility] [place] [facility]. Please see a copy of my visit note below.    Rheumatology Clinic In Person Follow up Visit Note  Zulma Lopez MD  DOS: 2022  Date of last visit: 10/1/2021    Name: Betty Tee  MRN: [id number]  Age: 81 year old  : 1940  Reason for visit: Follow up visit for R hip pain sec severe OA, PMR, presumed gout flare of L foot, primary Sjogren's syndrome    # Sjogren's syndrome since  with borderline +RG, +SSA, and lip biopsy focus score of 1. Ongoing dry mouth on evoxac qod  # Follicular bronchiolitis, prior mild ILD   # Osteopenia on DEXA 2019 with T score=-2.1 with decline in bone density on fosamax  # Chronic prednisone use  # DM  # A fib  # Severe R hip OA  # PMR stable on prednisone 5 mg every day  #Presumed gout flare, recovered  #ESOB      Assessment and Plan:    New Dx of PMR. Severe R hip pain is due to severe OA based on 2020 hip MRI. She prefered to avoid hip arthroplasty in the past but it is quite bothersome and would like to see ortho. Her inflammation markers improved on prednisone, PMR responded to prednisone, now is 5 mg qd.     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 Completed pulmonary rehab in 2019 where she was able to exercise without oxygen. GFR stable.    On HCQ since 2019 with significant improvement of joint pain. No retinal toxicity on eye exam done 2021. Tolerates HCQ well.     Sister Betty recovered from flare of presumed gout (or pseudogout given previous nl SUA) over L foot. She responded to high dose prednisone (40-30-20-10 mg every day each for 3 days) in the  past, now is on 5 mg every day.  Given her DM, osteopenia, highly recommend to start using anakinra prn for gout flares, no flares and has not required it yet.     She preferred in the past not to start daily urate-lowering therapy and her uric acid has been ~6 so allopurinol deferred.     Her major complaint is ESOB, she does not think it is lung related; therefore will check labs 1st and consider repeat PFTs, HR chest CT, 2D-echo, stress test pending labs plus follow up with PCP.    Plan:    Prednisone 5 mg a day    Norco as needed for pain      Cevimeline for dry mouth could be taken 3 times a day (she takes it every other day as does not like to take so many pills)    Anakinra 100 mg a day x 3 days as needed for gout flares    Stay on fosamax weekly    Continue  mg bid    Yearly eye exam on HCQ    Labs     Refer to Dr. Thomas Shannon    Return in 4-5 months (in person)          Orders Placed This Encounter   Procedures     ALT     Albumin level     AST     Creatinine     ESR     CRP inflammation     Uric acid     Protein Immunofixation Serum     Protein immunofixation urine     Ferritin     Iron and iron binding capacity     Reticulocyte count     Vitamin B12     Folate     Haptoglobin     Orthopedic  Referral     CBC with Platelets & Differential     Protein electrophoresis       Zulma Lopez MD         HPI:   Betty Tee is a 81 year old WF with a history of primary Sjogren's syndrome, PMR, OA who presents for follow-up. She was diagnosed with Sjogren's syndrome in 2015 with borderline +RG, +SSA, and lip biopsy focus score of 1. Associated ILD and joint pain are prednisone-responsive which support the diagnosis.     Today 10/1/2021: No gout flares since last visit so has not needed anakinra. She continues to have R hip pain related to severe OA but does not want to pursue surgery. She recently started pool therapy and began taking CBD oil yesterday. Is hoping to pursue medical  marijuana in the future as she does not want to be dependent on norco. Aside from hip pain has otherwise been doing well.        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 She is on prednisone 5 mg a day, she was given prednisone taper recently for possible L foot gout flare which helped. Did not flare again to need using anakinra shots. She has severe R hip pain. Saw ortho, had R hip MRI on 9/5/2020 which showed severe OA, R hip arthroplasty was recommended. She would prefer to delay R hip arthroplasty, had R hip inj on 2/24, NSAIDs are not allowed with CKD. Norco helps but she does not like to be dependent on it, takes it rarely. No L foot pain today. Her hip/leg pain/back pain is getting better, going to PT, doing exercises at home, last time elbow massage caused pain. Epidural shot helped. Has dry mouth. SOB is stable at baseline.  Last 3 wk has been hard, her doctor took her off gabapentin, sweat/chills and loss of appetite. Reason was being on other meds. Now off gabapentin. Had several gushing bowel explosion, could not make it to the bathroom. Random, unpredictable. No triggers. Has had this problem for years.     Today 2/11/2022: Sister betty presents for follow up.    No gout flares, has not required anakinra, it is in the fridge.    Has breathing issue, ESOB is worse. O2 level is good. Saw PCP, was recomemnded to do follow up with cardiology.    Getting R hip steroid inj, last one was for bursitis. Still hurts crissy today. Saw pain mx yesterday. Got minimal exercises to help moving.    Review of Systems:   A comprehensive ROS was done. Positives are per HPI.      Active Medications:     Outpatient Medications Prior to Visit   Medication Sig Dispense Refill     ACCU-CHEK SHANNON PLUS test strip USE TO TEST BLOOD SUGARS 3 TIMES DAILY 300 strip 5     acetaminophen (TYLENOL) 500 MG tablet Take 1,000 mg by mouth every 8 hours as needed (max 6 tablets/24 hours, 2  tablets/dose)        acyclovir (ZOVIRAX) 400 MG tablet Take 1 tablet (400 mg) by mouth 3 times daily For a couple days 15 tablet 2     acyclovir (ZOVIRAX) 5 % external ointment Apply topically 6 times daily As needed for outbreaks 15 g 3     albuterol (PROAIR HFA, PROVENTIL HFA, VENTOLIN HFA) 108 (90 BASE) MCG/ACT inhaler Inhale 2 puffs into the lungs every 6 hours 1 Inhaler 3     alendronate (FOSAMAX) 70 MG tablet Take 1 tablet (70 mg) by mouth every 7 days 12 tablet 2     anakinra (KINERET) 100 MG/0.67ML SOSY injection 100 mg every day x 3 days as needed for flare ups (Patient not taking: Reported on 2/8/2022) 6.7 mL 3     atorvastatin (LIPITOR) 10 MG tablet TAKE 1/2 TABLET BY MOUTH EVERY DAY 45 tablet 2     azithromycin (ZITHROMAX) 250 MG tablet Take 1 tablet (250 mg) by mouth daily 30 tablet 11     BD PEN NEEDLE JANE 2ND GEN 32G X 4 MM miscellaneous USE 4 DAILY AS DIRECTED. 400 each 1     blood glucose (NO BRAND SPECIFIED) lancets standard Use to test blood sugar 3 times daily or as directed 100 lancet 3     cevimeline (EVOXAC) 30 MG capsule Take 1 capsule (30 mg) by mouth 3 times daily (Patient not taking: Reported on 2/8/2022) 270 capsule 2     Cholecalciferol (VITAMIN D3) 50 MCG (2000 UT) CAPS Take 1 tablet (50 mcg) by mouth daily - Oral 90 capsule 2     COMPOUNDED NON-CONTROLLED SUBSTANCE (CMPD RX) - PHARMACY TO MIX COMPOUNDED MEDICATION Estriol 1 mg/g Apply small amount to finger and apply to inside vagina daily for 2 weeks then twice weekly Route: vaginally Dispense 30 grams 11 refills (Patient not taking: Reported on 2/8/2022) 30 g 11     escitalopram (LEXAPRO) 20 MG tablet TAKE 1 TABLET BY MOUTH EVERY DAY 90 tablet 0     famotidine (PEPCID) 10 MG tablet Take 20 mg by mouth 2 times daily        fluticasone (FLONASE) 50 MCG/ACT nasal spray Spray 1-2 sprays into both nostrils daily as needed for allergies 18.2 mL 11     fluticasone-vilanterol (BREO ELLIPTA) 100-25 MCG/INH inhaler Inhale 1 puff into the  lungs daily 1 Inhaler 11     HYDROcodone-acetaminophen (NORCO) 7.5-325 MG per tablet Take 1 tablet by mouth every 12 hours OK to start and fill 02/07/22 60 tablet 0     hydroxychloroquine (PLAQUENIL) 200 MG tablet Take 2 tablets (400 mg) by mouth daily Get annual eye exams for hydroxychloroquine (Plaquenil) monitoring and fax to [phone] 180 tablet 0     insulin aspart (NOVOLOG FLEXPEN) 100 UNIT/ML pen Inject 11 Units Subcutaneous 3 times daily (with meals) 15 mL 2     insulin glargine (BASAGLAR KWIKPEN) 100 UNIT/ML pen INJECT 22 UNITS SUBCUTANEOUS DAILY (TO REPLACE LANTUS) 15 mL 0     ketoconazole (NIZORAL) 2 % external cream Apply topically 2 times daily as needed for itching 60 g 1     KLOR-CON 20 MEQ CR tablet TAKE 2 TABLETS (40 MEQ) BY MOUTH EVERY MORNING AND 1 TABLET (20 MEQ) EVERY EVENING. 270 tablet 3     lidocaine (LIDODERM) 5 % patch APPLY PATCH TO PAINFUL AREA FOR UP TO 12 H WITHIN A 24 H PERIOD. REMOVE AFTER 12 HOURS. 30 patch 2     loratadine (CLARITIN) 10 MG tablet TAKE 1 TABLET BY MOUTH EVERY DAY 90 tablet 3     methocarbamol (ROBAXIN) 750 MG tablet Take 1 tablet (750 mg) by mouth 3 times daily as needed for muscle spasms 90 tablet 3     metoprolol succinate ER (TOPROL-XL) 25 MG 24 hr tablet Take 0.5 tablets (12.5 mg) by mouth daily TAKE 1/2 TABLET BY MOUTH 2 TIMES DAILY WITH 50 MG FOR A TOTAL OF 62.5 TWICE A DAY 45 tablet 3     metoprolol succinate ER (TOPROL-XL) 50 MG 24 hr tablet TAKE 50 MG BY MOUTH IN COMBINATION WITH 12.5 FOR A TOTAL OF 62.5 TWICE A DAY 90 tablet 3     montelukast (SINGULAIR) 10 MG tablet TAKE 1 TABLET BY MOUTH EVERYDAY AT BEDTIME 90 tablet 0     predniSONE (DELTASONE) 5 MG tablet Take 1 tablet (5 mg) by mouth daily 30 tablet 0     rivaroxaban ANTICOAGULANT (XARELTO ANTICOAGULANT) 20 MG TABS tablet TAKE 1 TABLET BY MOUTH DAILY WITH DINNER 90 tablet 3     Semaglutide, 1 MG/DOSE, (OZEMPIC, 1 MG/DOSE,) 4 MG/3ML SOPN Inject 1 mg Subcutaneous once a week 9 mL 1     spironolactone  (ALDACTONE) 25 MG tablet Take 2 tablets (50 mg) by mouth daily 180 tablet 3     torsemide (DEMADEX) 10 MG tablet TAKE ONE TAB (10 MG) WITH ONE 20 MG TAB TO = 30 MG DAILY IN AFTERNOON. 90 tablet 3     torsemide (DEMADEX) 20 MG tablet TAKE 2 TABLETS (40 MG) BY MOUTH EVERY MORNING AND 1 TABLET (20 MG) DAILY AT 2 PM. TAKE THE AFTERNOON DOSE WITH AN EXTRA 10 MG TAB FOR A TOTAL OF 30 MG IN THE AFTERNOON 270 tablet 3     traZODone (DESYREL) 50 MG tablet Take 0.5-1.5 tablets (25-75 mg) by mouth nightly as needed for sleep 135 tablet 1     No facility-administered medications prior to visit.     Allergies:   Augmentin\  Codeine  Phenobarbital  Seasonal allergies      Past Medical History:  Alcohol abuse, in remission.   Allergic rhinitis.   Antiplatelet long-term use.   Atrial fibrillation.   Cardiomegaly.   Osteopenia.   Diverticulosis.   Benign essential hypertension.   GERD.   Insomnia.   Irregular heart beat.   Lumbago.   Major depressive disorder, recurrent episode, moderate.   ANGELICA.  Osteoarthrosis.   Sjogren's syndrome.   Sleep apnea.   Tobacco use disorder.   TMJ disorder.   RLS.  Major depressive disorder.   Hypertension.   Sciatica.   Colouterine fistula.   Left ventricular hypertrophy.   Breat fibroadenoma.   DVT.   Fatty liver disease, nonalcoholic.   Rib pain.   Neck mass.   Interstitial lung disease.   Acute and chronic respiratory failure with hypoxia.   Type II diabetes mellitus.   Hyperlipidemia.   Inflammatory arthritis.      Past Surgical History:  Back surgery.   Left breast biopsy.   Appendectomy.   Exploratory laparotomy.   Cardiac surgery.   Cholecystectomy.   Left colectomy.   Total abdominal hysterectomy with bilateral salpingo-oophorectomy.   Insert ureter stent.   Flexible sigmoidoscopy.   Ileostomy takedown.     Family History:   Mother: Positive for CAD, heart disease, hypertension, cerebrovascular disease, hyperlipidemia.   Father: Positive for alcohol/drug abuse, Alzheimer disease, dementia,  hypertension, hyperlipidemia.   Sister: Positive for CAD, hypertension, and colorectal cancer.   Sister: Positive for hypertension and hyperlipidemia.   Sister: Positive for diabetes, lung cancer, asthma, and heart disease.   Brother: Positive for Parkinsonism and substance abuse.   Brother: Positive for hypertension, diverticulitis, and prostate cancer.   Daughter: Positive for breast cancer.        Social History:   She is a former smoker; quit in 2011. She has a history of alcohol use but stopped drinking in 1986.      Physical Exam:     BP (!) 148/67   Pulse 63   Wt 85.3 kg (188 lb)   SpO2 99%   BMI 29.44 kg/m      Constitutional: NAD, very pleasant, sister Nghia present to help  Eyes: Normal EOM, conjunctiva, sclera  Chest: CTAB  CV: no M/R/G, RRR  Abdomen: soft, NT  MSK: no active synovitis or joint tenderness. Painful ROM of R hip  Skin: No rash  Neuro: grossly non-focal  Psych: Normal affect.    Images:  CT Chest w/o contrast (7/25/18):  Findings remain most consistent with small airway disease  and/or nonclassifiable interstitial lung disease and are not  significantly changed from the March 2017 comparison.    Per radiology.    Laboratory:   RHEUM RESULTS Latest Ref Rng & Units 9/24/2004 10/3/2005 10/28/2005   ALBUMIN 3.4 - 5.0 g/dL - 4.0 -   ALT 0 - 50 U/L - 21 -   AST 0 - 45 U/L - 26 -   ANCA - - - -   COMPLEMENT C3 81 - 157 mg/dL - - -   COMPLEMENT C4 13 - 39 mg/dL - - -   CK TOTAL 30 - 225 U/L - - -   CREATININE 0.52 - 1.04 mg/dL 0.80 0.90 0.90   CRP 0.0 - 8.0 mg/L - - -   FRANCISCO <1.0 - - -   GFR ESTIMATE, IF BLACK >60 mL/min/[1.73:m2] >80 >80 >80   GFR ESTIMATE >60 mL/min/1.73m2 77 67 67   HEMATOCRIT 35.0 - 47.0 % 44.8 45.5 46.9   HEMOGLOBIN 11.7 - 15.7 g/dL 15.7 14.8 15.0   IGA 84 - 499 mg/dL - - -   IGM 35 - 242 mg/dL - - -    - 1,616 mg/dL - - -   WBC 4.0 - 11.0 10e3/uL 7.2 8.6 7.4   RBC 3.80 - 5.20 10e6/uL 4.87 4.90 4.99   RDW 10.0 - 15.0 % 13.7 14.4 14.1   MCHC 31.5 - 36.5 g/dL 35.0 32.5  32.0   MCV 78 - 100 fL 92 93 94   PLATELET COUNT 150 - 450 10e3/uL 207 233 236   RHEUMATOID FACTOR <12 IU/mL - - -   ESR 0 - 30 mm/hr - - -       Rheumatoid Factor   Date Value Ref Range Status   07/24/2015 <20 <20 IU/mL Final   ,  ,   Cyclic Cit Pept IgG/IgA   Date Value Ref Range Status   07/24/2015 <20  Interpretation:  Negative   <20 UNITS Final   ,  ,   Scleroderma Antibody Scl-70 CECILIA IgG   Date Value Ref Range Status   07/24/2015  0.0 - 0.9 AI Final    <0.2  Negative   Antibody index (AI) values reflect qualitative changes in antibody   concentration that cannot be directly associated with clinical condition or   disease state.       SSA (Ro) (CECILIA) Antibody, IgG   Date Value Ref Range Status   07/24/2015 1.6 (H) 0.0 - 0.9 AI Final     Comment:     Positive   Antibody index (AI) values reflect qualitative changes in antibody   concentration that cannot be directly associated with clinical condition or   disease state.       SSB (La) (CECILIA) Antibody, IgG   Date Value Ref Range Status   07/24/2015  0.0 - 0.9 AI Final    <0.2  Negative   Antibody index (AI) values reflect qualitative changes in antibody   concentration that cannot be directly associated with clinical condition or   disease state.       ,  ,   RG Screen by EIA   Date Value Ref Range Status   07/24/2015 <1.0  Interpretation:  Negative   <1.0 Final   ,  ,  ,  ,  ,  ,  ,  ,  ,  ,  ,  ,  ,  ,  ,  ,  ,  ,   Neutrophil Cytoplasmic IgG Antibody   Date Value Ref Range Status   07/24/2015   Final    <1:20  Reference range: <1:20  (Note)  The ANCA IFA is <1:20; therefore, no further testing will  be performed.  INTERPRETIVE INFORMATION: Anti-Neutrophil Cyto Ab, IgG  Neutrophil Cytoplasmic Antibodies (C-ANCA = granular  cytoplasmic staining, P-ANCA = perinuclear staining) are  found in the serum of over 90 percent of patients with  certain necrotizing systemic vasculitides, and usually in  less than 5 percent of patients with collagen vascular  disease or  arthritis.  Performed by [facility],  [address] [place] [id number] [phone]  [email or web address], Burke Mckeon MD, Lab. Director       ,  ,  ,  ,  ,  ,  ,   IGG   Date Value Ref Range Status   07/24/2015 1320 695 - 1620 mg/dL Final   ,  ,  ,  ,  ,   Scleroderma Antibody Scl-70 CECILIA IgG   Date Value Ref Range Status   07/24/2015  0.0 - 0.9 AI Final    <0.2  Negative   Antibody index (AI) values reflect qualitative changes in antibody   concentration that cannot be directly associated with clinical condition or   disease state.          CBC RESULTS: 6/4/2021   Lab Test 06/04/21  1422   WBC 8.6   RBC 4.46   HGB 13.4   HCT 42.1   MCV 94   MCH 30.0   MCHC 31.8   RDW 15.5*        Last Comprehensive Metabolic Panel:  Sodium   Date Value Ref Range Status   11/10/2021 142 133 - 144 mmol/L Final   06/04/2021 137 133 - 144 mmol/L Final     Potassium   Date Value Ref Range Status   11/10/2021 4.2 3.4 - 5.3 mmol/L Final   06/04/2021 4.0 3.4 - 5.3 mmol/L Final     Chloride   Date Value Ref Range Status   11/10/2021 108 94 - 109 mmol/L Final   06/04/2021 106 94 - 109 mmol/L Final     Carbon Dioxide   Date Value Ref Range Status   06/04/2021 25 20 - 32 mmol/L Final     Carbon Dioxide (CO2)   Date Value Ref Range Status   11/10/2021 28 20 - 32 mmol/L Final     Anion Gap   Date Value Ref Range Status   11/10/2021 6 3 - 14 mmol/L Final   06/04/2021 6 3 - 14 mmol/L Final     Glucose   Date Value Ref Range Status   11/10/2021 137 (H) 70 - 99 mg/dL Final   06/04/2021 142 (H) 70 - 99 mg/dL Final     Urea Nitrogen   Date Value Ref Range Status   11/10/2021 13 7 - 30 mg/dL Final   06/04/2021 14 7 - 30 mg/dL Final     Creatinine   Date Value Ref Range Status   12/09/2021 1.05 (H) 0.52 - 1.04 mg/dL Final   06/04/2021 1.11 (H) 0.52 - 1.04 mg/dL Final     GFR Estimate   Date Value Ref Range Status   12/09/2021 50 (L) >60 mL/min/1.73m2 Final     Comment:     As of July 11, 2021, eGFR is calculated by the CKD-EPI creatinine  equation, without race adjustment. eGFR can be influenced by muscle mass, exercise, and diet. The reported eGFR is an estimation only and is only applicable if the renal function is stable.   06/04/2021 47 (L) >60 mL/min/[1.73_m2] Final     Comment:     Non  GFR Calc  Starting 12/18/2018, serum creatinine based estimated GFR (eGFR) will be   calculated using the Chronic Kidney Disease Epidemiology Collaboration   (CKD-EPI) equation.       Calcium   Date Value Ref Range Status   11/10/2021 9.8 8.5 - 10.1 mg/dL Final   06/04/2021 8.8 8.5 - 10.1 mg/dL Final       ESR 6/4/2021: 10.0    CRP 6/4/2021: 3.0    Uric acid 6/4/2021: 6.2

## 2022-02-11 NOTE — PROGRESS NOTES
Heart Failure Cardiology Clinic Note  February 11, 2022      HPI    Dear colleagues,     I had the pleasure of seeing Ms. Betty Tee in the Heart Failure Cardiology clinic.  As you know, Ms. Betty Tee is a pleasant 81 year old female with a past medical history of heart failure with preserved ejection fraction.  Other pertinent history of hypertension, atrial fibrillation, Sjogren's syndrome and interstitial lung disease.  I am seeing her as a urgent visit for increased shortness of breath and dyspnea on exertion.  She follows with my colleague Dr. Radha Rosa.    Patient presents with her friend.  She has 2 main complaints largely revolving around chest discomfort and shortness of breath as well as swallowing difficulties.    She said she has had chest discomfort that has been occurring over the last month or 2.  Occurs only with exertion and feels in the center of her chest like pressure.  Does not occur at rest.  It occurs when she is trying to walk and is associated with some shortness of breath.  She just feels that she is congested as if she has to belch, though this is not associated with any change in her diet, nausea.  It does not radiate anywhere.  She has occasional lightheadedness upon standing but no falls or syncope.  She also has hip discomfort that prevents her from walking as well but really feels that her shortness of breath.  She uses a walker.  The only relief she gets is when resting.  She does not have orthopnea or PND or lower extremity swelling.  She has not had any weight gain, actually some weight loss she says.    Her swallowing it feels as if foods may get stuck in her esophagus.  She is has not really noticed if it is different with foods versus liquids.  She feels as if she needs to vomit occasionally.  She does not feel as if this is like her usual gastric reflux, and she has increased her Pepcid to twice daily as well.    PAST MEDICAL HISTORY:  Patient Active  Problem List   Diagnosis     Temporomandibular joint disorder     Diverticulitis of colon     Disorder of bone and cartilage     Alcohol abuse, in remission     Restless legs syndrome (RLS)     Major depressive disorder, recurrent episode, moderate (H)     Essential hypertension with goal blood pressure less than 140/90     Advanced directives, counseling/discussion     Colouterine fistula     Paroxysmal atrial fibrillation (H)     Left ventricular hypertrophy     Health Care Home     Insomnia     Joint pains     Allergic reaction caused by a drug - likely plaquenil     Breast fibroadenoma     History of deep vein thrombosis (DVT) of lower extremity     Fatty liver disease, nonalcoholic     Rib pain     Neck mass     Gastroesophageal reflux disease without esophagitis     Enlarged lymph node     Sjogren's syndrome with lung involvement (H)     ANGELICA (obstructive sleep apnea)     ILD (interstitial lung disease) (H)     Lower GI bleed     (HFpEF) heart failure with preserved ejection fraction (H)     Type 2 diabetes mellitus with hyperglycemia, with long-term current use of insulin (H)     Heart failure, chronic, with acute decompensation (H)     Hyperlipidemia LDL goal <100     Long term current use of anticoagulant therapy     Inflammatory arthritis     Follicular bronchiolitis (H)     Stage 3b chronic kidney disease (H)     Closed head injury     Urge incontinence of urine     Osteoarthritis of right hip     Hip pain, right        FAMILY HISTORY:  Family History   Problem Relation Age of Onset     C.A.D. Mother 63        MI- first at age 63     Heart Disease Mother      Hypertension Mother      Cerebrovascular Disease Mother      Hyperlipidemia Mother      Coronary Artery Disease Mother         MI-first at age 63     Other - See Comments Mother         cerebrovascular disease      Alcohol/Drug Father      Alzheimer Disease Father      Dementia Father      Hypertension Father      Hyperlipidemia Father      Substance  Abuse Father      Diabetes Sister      Hypertension Sister      Hyperlipidemia Sister      Substance Abuse Sister      Asthma Sister      C.A.D. Sister 52        Minor MI- age 50's     Heart Disease Sister      Hypertension Sister      Hypertension Sister      Hypertension Brother      Cancer - colorectal Sister 48        Late 40's early 50's     Prostate Cancer Brother 74        Dx'd age 74     Gastrointestinal Disease Sister         Diverticulitis     Gastrointestinal Disease Brother         Diverticulitis     Lipids Sister      Lipids Sister      Parkinsonism Brother      Diabetes Sister      Heart Disease Sister         CHF     Cancer Sister         lung, smoker     Substance Abuse Sister      Substance Abuse Brother      Asthma Sister      Cancer Sister      Breast Cancer Daughter      Prostate Cancer Brother      Hyperlipidemia Brother      Diabetes Other      Hypertension Other      Coronary Artery Disease Sister         minor MI-age 50's     Heart Disease Sister      Hypertension Sister      Hypertension Brother      Hypertension Sister      Colon Cancer Sister      Prostate Cancer Brother      Diverticulitis Sister      Diverticulitis Brother      Parkinsonism Brother      Lung Cancer Sister      Breast Cancer Daughter      Heart Disease Sister         CHF     Diabetes Sister      Asthma Sister        SOCIAL HISTORY:  Social History     Tobacco Use     Smoking status: Former Smoker     Packs/day: 0.50     Years: 10.00     Pack years: 5.00     Types: Cigarettes     Quit date: 8/1/2011     Years since quitting: 10.5     Smokeless tobacco: Never Used     Tobacco comment: 1/2 ppd   Substance Use Topics     Alcohol use: No     Alcohol/week: 0.0 standard drinks     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     Drug use: No        ALLERGIES:  Allergies   Allergen Reactions     Amoxicillin-Pot Clavulanate      Augmentin Nausea and Vomiting     Codeine Nausea and Vomiting     Codeine      PN: LW Reaction: HIVES      Penicillins Nausea and Vomiting     PN: LW Reaction: GI Upset     Phenobarbital Itching     Phenobarbital      Seasonal Allergies        CURRENT MEDICATIONS:  Current Outpatient Medications   Medication Sig Dispense Refill     ACCU-CHEK SHANNON PLUS test strip USE TO TEST BLOOD SUGARS 3 TIMES DAILY 300 strip 5     acetaminophen (TYLENOL) 500 MG tablet Take 1,000 mg by mouth every 8 hours as needed (max 6 tablets/24 hours, 2 tablets/dose)        acyclovir (ZOVIRAX) 400 MG tablet Take 1 tablet (400 mg) by mouth 3 times daily For a couple days 15 tablet 2     acyclovir (ZOVIRAX) 5 % external ointment Apply topically 6 times daily As needed for outbreaks 15 g 3     albuterol (PROAIR HFA, PROVENTIL HFA, VENTOLIN HFA) 108 (90 BASE) MCG/ACT inhaler Inhale 2 puffs into the lungs every 6 hours 1 Inhaler 3     alendronate (FOSAMAX) 70 MG tablet Take 1 tablet (70 mg) by mouth every 7 days 12 tablet 2     anakinra (KINERET) 100 MG/0.67ML SOSY injection 100 mg every day x 3 days as needed for flare ups (Patient not taking: Reported on 2/8/2022) 6.7 mL 3     atorvastatin (LIPITOR) 10 MG tablet TAKE 1/2 TABLET BY MOUTH EVERY DAY 45 tablet 2     azithromycin (ZITHROMAX) 250 MG tablet Take 1 tablet (250 mg) by mouth daily 30 tablet 11     BD PEN NEEDLE JANE 2ND GEN 32G X 4 MM miscellaneous USE 4 DAILY AS DIRECTED. 400 each 1     blood glucose (NO BRAND SPECIFIED) lancets standard Use to test blood sugar 3 times daily or as directed 100 lancet 3     cevimeline (EVOXAC) 30 MG capsule Take 1 capsule (30 mg) by mouth 3 times daily (Patient not taking: Reported on 2/8/2022) 270 capsule 2     Cholecalciferol (VITAMIN D3) 50 MCG (2000 UT) CAPS Take 1 tablet (50 mcg) by mouth daily - Oral 90 capsule 2     COMPOUNDED NON-CONTROLLED SUBSTANCE (CMPD RX) - PHARMACY TO MIX COMPOUNDED MEDICATION Estriol 1 mg/g Apply small amount to finger and apply to inside vagina daily for 2 weeks then twice weekly Route: vaginally Dispense 30 grams 11 refills  (Patient not taking: Reported on 2/8/2022) 30 g 11     escitalopram (LEXAPRO) 20 MG tablet TAKE 1 TABLET BY MOUTH EVERY DAY 90 tablet 0     famotidine (PEPCID) 10 MG tablet Take 20 mg by mouth 2 times daily        fluticasone (FLONASE) 50 MCG/ACT nasal spray Spray 1-2 sprays into both nostrils daily as needed for allergies 18.2 mL 11     fluticasone-vilanterol (BREO ELLIPTA) 100-25 MCG/INH inhaler Inhale 1 puff into the lungs daily 1 Inhaler 11     HYDROcodone-acetaminophen (NORCO) 7.5-325 MG per tablet Take 1 tablet by mouth every 12 hours OK to start and fill 02/07/22 60 tablet 0     hydroxychloroquine (PLAQUENIL) 200 MG tablet Take 2 tablets (400 mg) by mouth daily Get annual eye exams for hydroxychloroquine (Plaquenil) monitoring and fax to [phone] 180 tablet 0     insulin aspart (NOVOLOG FLEXPEN) 100 UNIT/ML pen Inject 11 Units Subcutaneous 3 times daily (with meals) 15 mL 2     insulin glargine (BASAGLAR KWIKPEN) 100 UNIT/ML pen INJECT 22 UNITS SUBCUTANEOUS DAILY (TO REPLACE LANTUS) 15 mL 0     ketoconazole (NIZORAL) 2 % external cream Apply topically 2 times daily as needed for itching 60 g 1     KLOR-CON 20 MEQ CR tablet TAKE 2 TABLETS (40 MEQ) BY MOUTH EVERY MORNING AND 1 TABLET (20 MEQ) EVERY EVENING. 270 tablet 3     lidocaine (LIDODERM) 5 % patch APPLY PATCH TO PAINFUL AREA FOR UP TO 12 H WITHIN A 24 H PERIOD. REMOVE AFTER 12 HOURS. 30 patch 2     loratadine (CLARITIN) 10 MG tablet TAKE 1 TABLET BY MOUTH EVERY DAY 90 tablet 3     methocarbamol (ROBAXIN) 750 MG tablet Take 1 tablet (750 mg) by mouth 3 times daily as needed for muscle spasms 90 tablet 3     metoprolol succinate ER (TOPROL-XL) 25 MG 24 hr tablet Take 0.5 tablets (12.5 mg) by mouth daily TAKE 1/2 TABLET BY MOUTH 2 TIMES DAILY WITH 50 MG FOR A TOTAL OF 62.5 TWICE A DAY 45 tablet 3     metoprolol succinate ER (TOPROL-XL) 50 MG 24 hr tablet TAKE 50 MG BY MOUTH IN COMBINATION WITH 12.5 FOR A TOTAL OF 62.5 TWICE A DAY 90 tablet 3      montelukast (SINGULAIR) 10 MG tablet TAKE 1 TABLET BY MOUTH EVERYDAY AT BEDTIME 90 tablet 0     predniSONE (DELTASONE) 5 MG tablet Take 1 tablet (5 mg) by mouth daily 30 tablet 0     rivaroxaban ANTICOAGULANT (XARELTO ANTICOAGULANT) 20 MG TABS tablet TAKE 1 TABLET BY MOUTH DAILY WITH DINNER 90 tablet 3     Semaglutide, 1 MG/DOSE, (OZEMPIC, 1 MG/DOSE,) 4 MG/3ML SOPN Inject 1 mg Subcutaneous once a week 9 mL 1     spironolactone (ALDACTONE) 25 MG tablet Take 2 tablets (50 mg) by mouth daily 180 tablet 3     torsemide (DEMADEX) 10 MG tablet TAKE ONE TAB (10 MG) WITH ONE 20 MG TAB TO = 30 MG DAILY IN AFTERNOON. 90 tablet 3     torsemide (DEMADEX) 20 MG tablet TAKE 2 TABLETS (40 MG) BY MOUTH EVERY MORNING AND 1 TABLET (20 MG) DAILY AT 2 PM. TAKE THE AFTERNOON DOSE WITH AN EXTRA 10 MG TAB FOR A TOTAL OF 30 MG IN THE AFTERNOON 270 tablet 3     traZODone (DESYREL) 50 MG tablet Take 0.5-1.5 tablets (25-75 mg) by mouth nightly as needed for sleep 135 tablet 1     EXAM:  /73 (BP Location: Right arm, Patient Position: Chair, Cuff Size: Adult Regular)   Pulse 53   Wt 84 kg (185 lb 3.2 oz)   SpO2 98%   BMI 29.01 kg/m      General: appears comfortable, alert and articulate  Head: normocephalic, atraumatic  Eyes: anicteric sclera, EOMI  Heart: Bradycardic, estimated JVP 8  Lungs: clear, no rales or wheezing  Abdomen: soft, non-tender, bowel sounds present, no hepatosplenomegaly  Extremities: no clubbing, cyanosis or edema  Neurological: normal speech and affect, no gross motor deficits    Weight  Wt Readings from Last 10 Encounters:   02/11/22 85.3 kg (188 lb)   02/08/22 84.2 kg (185 lb 11.2 oz)   11/16/21 88.1 kg (194 lb 3.2 oz)   10/22/21 89.4 kg (197 lb)   09/14/21 89.9 kg (198 lb 1.6 oz)   09/09/21 87.9 kg (193 lb 12.8 oz)   07/14/21 90.7 kg (200 lb)   06/04/21 90.7 kg (200 lb)   03/03/21 92.5 kg (204 lb)   01/05/21 93.4 kg (206 lb)       Labs:  Reviewed.     CBC RESULTS:  Lab Results   Component Value Date    WBC  11.5 (H) 02/11/2022    WBC 8.6 06/04/2021    RBC 3.47 (L) 02/11/2022    RBC 4.46 06/04/2021    HGB 9.1 (L) 02/11/2022    HGB 13.4 06/04/2021    HCT 30.2 (L) 02/11/2022    HCT 42.1 06/04/2021    MCV 87 02/11/2022    MCV 94 06/04/2021    MCH 26.2 (L) 02/11/2022    MCH 30.0 06/04/2021    MCHC 30.1 (L) 02/11/2022    MCHC 31.8 06/04/2021    RDW 15.1 (H) 02/11/2022    RDW 15.5 (H) 06/04/2021     02/11/2022     06/04/2021       CMP RESULTS:  Lab Results   Component Value Date     11/10/2021     06/04/2021    POTASSIUM 4.2 11/10/2021    POTASSIUM 4.0 06/04/2021    CHLORIDE 108 11/10/2021    CHLORIDE 106 06/04/2021    CO2 28 11/10/2021    CO2 25 06/04/2021    ANIONGAP 6 11/10/2021    ANIONGAP 6 06/04/2021     (H) 11/10/2021     (H) 06/04/2021    BUN 13 11/10/2021    BUN 14 06/04/2021    CR 1.05 (H) 12/09/2021    CR 1.11 (H) 06/04/2021    GFRESTIMATED 50 (L) 12/09/2021    GFRESTIMATED 47 (L) 06/04/2021    GFRESTBLACK 54 (L) 06/04/2021    CLAUDY 9.8 11/10/2021    CLAUDY 8.8 06/04/2021    BILITOTAL 0.6 12/04/2020    ALBUMIN 3.2 (L) 12/09/2021    ALBUMIN 3.5 06/04/2021    ALKPHOS 95 12/04/2020    ALT 17 12/09/2021    ALT 25 06/04/2021    AST 15 12/09/2021    AST 17 06/04/2021        BNP RESULTS:  Lab Results   Component Value Date    NTBNPI 3,531 (H) 04/06/2017       Testing/Procedures:  I personally visualized and interpreted:  Echocardiogram 9/9/2021: LVEF 55 to 60% normal RV size and function no significant valvular abnormalities, dilated IVC  EKG 5/3/2021: Sinus bradycardia  EKG 2/11/2022: Sinus bradycardia, low voltage, no acute ischemia    Assessment and Plan:     In summary, this is a very pleasant 81-year-old female with heart failure with preserved ejection fraction who presents with increased dyspnea on exertion and chest discomfort.    I think her chest discomfort may represent typical cardiac angina.  Elderly woman with diabetes are the most likely to present with atypical signs of  cardiac angina, though I think she has many typical signs.  I did talk to her that the definitive tests would be a stress test and/or an angiogram, though she wishes to refrain from more testing.  I do not think she is having any active infarct but I told her to consider the testing.    She is already on good medical management and her vitals are stable.  I did wonder if she could be having some chronotropic incompetence and that is what is causing her fatigue with exertion.  I did have her walk the hallways with her walker and then the highest her heart rate got was 69 bpm and she was clearly tired although did not express any chest pain.  I did tell her that may be reducing her metoprolol dose which is that 62-1/2 mg twice a day may allow her heart rate to pump faster and she may be less winded when exerting herself.  She want to make very conservative changes thus we may drop to to 50 mg a day.  I did tell her that this may not improve or help the chest discomfort she experiences but she want to try the stuff first.    Regarding her swallowing, I am not completely sure what could be the cause.  Given her age and history of gastric reflux disease I am not sure if she would warrant a barium swallow study versus an EGD.  Again she did not really want any further tests or procedures at the moment.  We will continue conservative management with her Pepcid.  She does have a severely enlarged left atrium and I also mentioned that could be a potential cause of now the elevated left atrial pressure pressing against the esophagus.  She does appear euvolemic on exam.    She will follow up with our nurse regarding how she is doing and consider the testing as above.    The patient states understanding and is agreeable with plan.   Feel free to contact myself regarding questions or concerns.  It was a pleasure to see this patient today.    Amy Moreno MD   of Medicine  Advanced Heart Failure and  Transplant Cardiology    [facility]  [facility]    Today's clinic visit entailed:  40 minutes spent on the date of the encounter doing chart review, history and exam, documentation and further activities per the note  Provider  Link to MDM Help Grid     The level of medical decision making during this visit was of high complexity.

## 2022-02-11 NOTE — PATIENT INSTRUCTIONS
"Cardiology Providers you saw during your visit:  Dr. Moreno    Medication changes:  1.  Decrease metoprolol to 100 mg daily    Follow up:  1.  Follow up with [facility] in March as scheduled  2.  Follow up with Dr Rosa in May with labs      Please call if you have :  1. Weight gain of more than 2 pounds in a day or 5 pounds in a week  2. Increased shortness of breath, swelling or bloating  3. Dizziness, lightheadedness   4. Any questions or concerns.       Follow the American Heart Association Diet and Lifestyle recommendations:  Limit saturated fat, trans fat, sodium, red meat, sweets and sugar-sweetened beverages. If you choose to eat red meat, compare labels and select the leanest cuts available.  Aim for at least 150 minutes of moderate physical activity or 75 minutes of vigorous physical activity - or an equal combination of both - each week.      During business hours: [phone], press option # 1 to schedule or leave a message for your care team      After hours, weekends or holidays: On Call Cardiologist- [phone]   option #4 and ask to speak to the on-call Cardiologist. Inform them you are a [facility]/heart failure patient at the [facility].      Heart Failure Support Group  Virtual meetings 2022, , 1-2 p.m.  Monday, , 1-2 p.m.  Monday, , 1-2 p.m.  Monday, , 1-2 p.m.  Monday, May 2nd, 1-2 p.m.  Monday, , 1-2 p.m.  Monday, , 1-2 p.m.  Monday, 1-2 p.m.  Monday, , 1-2 p.m.  Monday, , 1-2 p.m.  Monday, , 1-2 p.m.  Monday, , 1-2 p.m.      Flor Velasquez RN BSN CHFN  Cardiology Care Coordinator - Heart Failure/ [facility]  [facility]   Questions and [phone]   First press #1 for the [employer] and then press #4 for \"To send a message to your care team\"      "

## 2022-02-11 NOTE — PROGRESS NOTES
Rheumatology Clinic In Person Follow up Visit Note  Zulma Lopez MD  DOS: 2022  Date of last visit: 10/1/2021    Name: Betty Tee  MRN: [id number]  Age: 81 year old  : 1940  Reason for visit: Follow up visit for R hip pain sec severe OA, PMR, presumed gout flare of L foot, primary Sjogren's syndrome    # Sjogren's syndrome since  with borderline +RG, +SSA, and lip biopsy focus score of 1. Ongoing dry mouth on evoxac qod  # Follicular bronchiolitis, prior mild ILD   # Osteopenia on DEXA 2019 with T score=-2.1 with decline in bone density on fosamax  # Chronic prednisone use  # DM  # A fib  # Severe R hip OA  # PMR stable on prednisone 5 mg every day  #Presumed gout flare, recovered  #ESOB      Assessment and Plan:    New Dx of PMR. Severe R hip pain is due to severe OA based on 2020 hip MRI. She prefered to avoid hip arthroplasty in the past but it is quite bothersome and would like to see ortho. Her inflammation markers improved on prednisone, PMR responded to prednisone, now is 5 mg qd.     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 Completed pulmonary rehab in 2019 where she was able to exercise without oxygen. GFR stable.    On HCQ since 2019 with significant improvement of joint pain. No retinal toxicity on eye exam done 2021. Tolerates HCQ well.     Sister Betty recovered from flare of presumed gout (or pseudogout given previous nl SUA) over L foot. She responded to high dose prednisone (40-30-20-10 mg every day each for 3 days) in the past, now is on 5 mg every day.  Given her DM, osteopenia, highly recommend to start using anakinra prn for gout flares, no flares and has not required it yet.     She preferred in the past not to start daily urate-lowering therapy and her uric acid has been ~6 so allopurinol deferred.     Her major complaint is ESOB, she does  not think it is lung related; therefore will check labs 1st and consider repeat PFTs, HR chest CT, 2D-echo, stress test pending labs plus follow up with PCP.    Plan:    Prednisone 5 mg a day    Norco as needed for pain      Cevimeline for dry mouth could be taken 3 times a day (she takes it every other day as does not like to take so many pills)    Anakinra 100 mg a day x 3 days as needed for gout flares    Stay on fosamax weekly    Continue  mg bid    Yearly eye exam on HCQ    Labs     Refer to Dr. Thomas Shannon    Return in 4-5 months (in person)          Orders Placed This Encounter   Procedures     ALT     Albumin level     AST     Creatinine     ESR     CRP inflammation     Uric acid     Protein Immunofixation Serum     Protein immunofixation urine     Ferritin     Iron and iron binding capacity     Reticulocyte count     Vitamin B12     Folate     Haptoglobin     Orthopedic  Referral     CBC with Platelets & Differential     Protein electrophoresis       Zulma Lopze MD         HPI:   Betty Tee is a 81 year old WF with a history of primary Sjogren's syndrome, PMR, OA who presents for follow-up. She was diagnosed with Sjogren's syndrome in 2015 with borderline +RG, +SSA, and lip biopsy focus score of 1. Associated ILD and joint pain are prednisone-responsive which support the diagnosis.     Today 10/1/2021: No gout flares since last visit so has not needed anakinra. She continues to have R hip pain related to severe OA but does not want to pursue surgery. She recently started pool therapy and began taking CBD oil yesterday. Is hoping to pursue medical marijuana in the future as she does not want to be dependent on norco. Aside from hip pain has otherwise been doing well.        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 She is on prednisone 5 mg a day, she was given prednisone taper recently for possible L foot gout flare which helped. Did not  flare again to need using anakinra shots. She has severe R hip pain. Saw ortho, had R hip MRI on 9/5/2020 which showed severe OA, R hip arthroplasty was recommended. She would prefer to delay R hip arthroplasty, had R hip inj on 2/24, NSAIDs are not allowed with CKD. Norco helps but she does not like to be dependent on it, takes it rarely. No L foot pain today. Her hip/leg pain/back pain is getting better, going to PT, doing exercises at home, last time elbow massage caused pain. Epidural shot helped. Has dry mouth. SOB is stable at baseline.  Last 3 wk has been hard, her doctor took her off gabapentin, sweat/chills and loss of appetite. Reason was being on other meds. Now off gabapentin. Had several gushing bowel explosion, could not make it to the bathroom. Random, unpredictable. No triggers. Has had this problem for years.     Today 2/11/2022: Sister milton presents for follow up.    No gout flares, has not required anakinra, it is in the fridge.    Has breathing issue, ESOB is worse. O2 level is good. Saw PCP, was recomemnded to do follow up with cardiology.    Getting R hip steroid inj, last one was for bursitis. Still hurts crissy today. Saw pain mx yesterday. Got minimal exercises to help moving.    Review of Systems:   A comprehensive ROS was done. Positives are per HPI.      Active Medications:     Outpatient Medications Prior to Visit   Medication Sig Dispense Refill     ACCU-CHEK SHANNON PLUS test strip USE TO TEST BLOOD SUGARS 3 TIMES DAILY 300 strip 5     acetaminophen (TYLENOL) 500 MG tablet Take 1,000 mg by mouth every 8 hours as needed (max 6 tablets/24 hours, 2 tablets/dose)        acyclovir (ZOVIRAX) 400 MG tablet Take 1 tablet (400 mg) by mouth 3 times daily For a couple days 15 tablet 2     acyclovir (ZOVIRAX) 5 % external ointment Apply topically 6 times daily As needed for outbreaks 15 g 3     albuterol (PROAIR HFA, PROVENTIL HFA, VENTOLIN HFA) 108 (90 BASE) MCG/ACT inhaler Inhale 2 puffs into the  lungs every 6 hours 1 Inhaler 3     alendronate (FOSAMAX) 70 MG tablet Take 1 tablet (70 mg) by mouth every 7 days 12 tablet 2     anakinra (KINERET) 100 MG/0.67ML SOSY injection 100 mg every day x 3 days as needed for flare ups (Patient not taking: Reported on 2/8/2022) 6.7 mL 3     atorvastatin (LIPITOR) 10 MG tablet TAKE 1/2 TABLET BY MOUTH EVERY DAY 45 tablet 2     azithromycin (ZITHROMAX) 250 MG tablet Take 1 tablet (250 mg) by mouth daily 30 tablet 11     BD PEN NEEDLE JANE 2ND GEN 32G X 4 MM miscellaneous USE 4 DAILY AS DIRECTED. 400 each 1     blood glucose (NO BRAND SPECIFIED) lancets standard Use to test blood sugar 3 times daily or as directed 100 lancet 3     cevimeline (EVOXAC) 30 MG capsule Take 1 capsule (30 mg) by mouth 3 times daily (Patient not taking: Reported on 2/8/2022) 270 capsule 2     Cholecalciferol (VITAMIN D3) 50 MCG (2000 UT) CAPS Take 1 tablet (50 mcg) by mouth daily - Oral 90 capsule 2     COMPOUNDED NON-CONTROLLED SUBSTANCE (CMPD RX) - PHARMACY TO MIX COMPOUNDED MEDICATION Estriol 1 mg/g Apply small amount to finger and apply to inside vagina daily for 2 weeks then twice weekly Route: vaginally Dispense 30 grams 11 refills (Patient not taking: Reported on 2/8/2022) 30 g 11     escitalopram (LEXAPRO) 20 MG tablet TAKE 1 TABLET BY MOUTH EVERY DAY 90 tablet 0     famotidine (PEPCID) 10 MG tablet Take 20 mg by mouth 2 times daily        fluticasone (FLONASE) 50 MCG/ACT nasal spray Spray 1-2 sprays into both nostrils daily as needed for allergies 18.2 mL 11     fluticasone-vilanterol (BREO ELLIPTA) 100-25 MCG/INH inhaler Inhale 1 puff into the lungs daily 1 Inhaler 11     HYDROcodone-acetaminophen (NORCO) 7.5-325 MG per tablet Take 1 tablet by mouth every 12 hours OK to start and fill 02/07/22 60 tablet 0     hydroxychloroquine (PLAQUENIL) 200 MG tablet Take 2 tablets (400 mg) by mouth daily Get annual eye exams for hydroxychloroquine (Plaquenil) monitoring and fax to [phone]  tablet 0     insulin aspart (NOVOLOG FLEXPEN) 100 UNIT/ML pen Inject 11 Units Subcutaneous 3 times daily (with meals) 15 mL 2     insulin glargine (BASAGLAR KWIKPEN) 100 UNIT/ML pen INJECT 22 UNITS SUBCUTANEOUS DAILY (TO REPLACE LANTUS) 15 mL 0     ketoconazole (NIZORAL) 2 % external cream Apply topically 2 times daily as needed for itching 60 g 1     KLOR-CON 20 MEQ CR tablet TAKE 2 TABLETS (40 MEQ) BY MOUTH EVERY MORNING AND 1 TABLET (20 MEQ) EVERY EVENING. 270 tablet 3     lidocaine (LIDODERM) 5 % patch APPLY PATCH TO PAINFUL AREA FOR UP TO 12 H WITHIN A 24 H PERIOD. REMOVE AFTER 12 HOURS. 30 patch 2     loratadine (CLARITIN) 10 MG tablet TAKE 1 TABLET BY MOUTH EVERY DAY 90 tablet 3     methocarbamol (ROBAXIN) 750 MG tablet Take 1 tablet (750 mg) by mouth 3 times daily as needed for muscle spasms 90 tablet 3     metoprolol succinate ER (TOPROL-XL) 25 MG 24 hr tablet Take 0.5 tablets (12.5 mg) by mouth daily TAKE 1/2 TABLET BY MOUTH 2 TIMES DAILY WITH 50 MG FOR A TOTAL OF 62.5 TWICE A DAY 45 tablet 3     metoprolol succinate ER (TOPROL-XL) 50 MG 24 hr tablet TAKE 50 MG BY MOUTH IN COMBINATION WITH 12.5 FOR A TOTAL OF 62.5 TWICE A DAY 90 tablet 3     montelukast (SINGULAIR) 10 MG tablet TAKE 1 TABLET BY MOUTH EVERYDAY AT BEDTIME 90 tablet 0     predniSONE (DELTASONE) 5 MG tablet Take 1 tablet (5 mg) by mouth daily 30 tablet 0     rivaroxaban ANTICOAGULANT (XARELTO ANTICOAGULANT) 20 MG TABS tablet TAKE 1 TABLET BY MOUTH DAILY WITH DINNER 90 tablet 3     Semaglutide, 1 MG/DOSE, (OZEMPIC, 1 MG/DOSE,) 4 MG/3ML SOPN Inject 1 mg Subcutaneous once a week 9 mL 1     spironolactone (ALDACTONE) 25 MG tablet Take 2 tablets (50 mg) by mouth daily 180 tablet 3     torsemide (DEMADEX) 10 MG tablet TAKE ONE TAB (10 MG) WITH ONE 20 MG TAB TO = 30 MG DAILY IN AFTERNOON. 90 tablet 3     torsemide (DEMADEX) 20 MG tablet TAKE 2 TABLETS (40 MG) BY MOUTH EVERY MORNING AND 1 TABLET (20 MG) DAILY AT 2 PM. TAKE THE AFTERNOON DOSE WITH AN  EXTRA 10 MG TAB FOR A TOTAL OF 30 MG IN THE AFTERNOON 270 tablet 3     traZODone (DESYREL) 50 MG tablet Take 0.5-1.5 tablets (25-75 mg) by mouth nightly as needed for sleep 135 tablet 1     No facility-administered medications prior to visit.     Allergies:   Augmentin\  Codeine  Phenobarbital  Seasonal allergies      Past Medical History:  Alcohol abuse, in remission.   Allergic rhinitis.   Antiplatelet long-term use.   Atrial fibrillation.   Cardiomegaly.   Osteopenia.   Diverticulosis.   Benign essential hypertension.   GERD.   Insomnia.   Irregular heart beat.   Lumbago.   Major depressive disorder, recurrent episode, moderate.   ANGELICA.  Osteoarthrosis.   Sjogren's syndrome.   Sleep apnea.   Tobacco use disorder.   TMJ disorder.   RLS.  Major depressive disorder.   Hypertension.   Sciatica.   Colouterine fistula.   Left ventricular hypertrophy.   Breat fibroadenoma.   DVT.   Fatty liver disease, nonalcoholic.   Rib pain.   Neck mass.   Interstitial lung disease.   Acute and chronic respiratory failure with hypoxia.   Type II diabetes mellitus.   Hyperlipidemia.   Inflammatory arthritis.      Past Surgical History:  Back surgery.   Left breast biopsy.   Appendectomy.   Exploratory laparotomy.   Cardiac surgery.   Cholecystectomy.   Left colectomy.   Total abdominal hysterectomy with bilateral salpingo-oophorectomy.   Insert ureter stent.   Flexible sigmoidoscopy.   Ileostomy takedown.     Family History:   Mother: Positive for CAD, heart disease, hypertension, cerebrovascular disease, hyperlipidemia.   Father: Positive for alcohol/drug abuse, Alzheimer disease, dementia, hypertension, hyperlipidemia.   Sister: Positive for CAD, hypertension, and colorectal cancer.   Sister: Positive for hypertension and hyperlipidemia.   Sister: Positive for diabetes, lung cancer, asthma, and heart disease.   Brother: Positive for Parkinsonism and substance abuse.   Brother: Positive for hypertension, diverticulitis, and prostate  cancer.   Daughter: Positive for breast cancer.        Social History:   She is a former smoker; quit in 2011. She has a history of alcohol use but stopped drinking in 1986.      Physical Exam:     BP (!) 148/67   Pulse 63   Wt 85.3 kg (188 lb)   SpO2 99%   BMI 29.44 kg/m      Constitutional: NAD, very pleasant, sister Nghia present to help  Eyes: Normal EOM, conjunctiva, sclera  Chest: CTAB  CV: no M/R/G, RRR  Abdomen: soft, NT  MSK: no active synovitis or joint tenderness. Painful ROM of R hip  Skin: No rash  Neuro: grossly non-focal  Psych: Normal affect.    Images:  CT Chest w/o contrast (7/25/18):  Findings remain most consistent with small airway disease  and/or nonclassifiable interstitial lung disease and are not  significantly changed from the March 2017 comparison.    Per radiology.    Laboratory:   RHEUM RESULTS Latest Ref Rng & Units 9/24/2004 10/3/2005 10/28/2005   ALBUMIN 3.4 - 5.0 g/dL - 4.0 -   ALT 0 - 50 U/L - 21 -   AST 0 - 45 U/L - 26 -   ANCA - - - -   COMPLEMENT C3 81 - 157 mg/dL - - -   COMPLEMENT C4 13 - 39 mg/dL - - -   CK TOTAL 30 - 225 U/L - - -   CREATININE 0.52 - 1.04 mg/dL 0.80 0.90 0.90   CRP 0.0 - 8.0 mg/L - - -   FRANCISCO <1.0 - - -   GFR ESTIMATE, IF BLACK >60 mL/min/[1.73:m2] >80 >80 >80   GFR ESTIMATE >60 mL/min/1.73m2 77 67 67   HEMATOCRIT 35.0 - 47.0 % 44.8 45.5 46.9   HEMOGLOBIN 11.7 - 15.7 g/dL 15.7 14.8 15.0   IGA 84 - 499 mg/dL - - -   IGM 35 - 242 mg/dL - - -    - 1,616 mg/dL - - -   WBC 4.0 - 11.0 10e3/uL 7.2 8.6 7.4   RBC 3.80 - 5.20 10e6/uL 4.87 4.90 4.99   RDW 10.0 - 15.0 % 13.7 14.4 14.1   MCHC 31.5 - 36.5 g/dL 35.0 32.5 32.0   MCV 78 - 100 fL 92 93 94   PLATELET COUNT 150 - 450 10e3/uL 207 233 236   RHEUMATOID FACTOR <12 IU/mL - - -   ESR 0 - 30 mm/hr - - -       Rheumatoid Factor   Date Value Ref Range Status   07/24/2015 <20 <20 IU/mL Final   ,  ,   Cyclic Cit Pept IgG/IgA   Date Value Ref Range Status   07/24/2015 <20  Interpretation:  Negative   <20 UNITS  Final   ,  ,   Scleroderma Antibody Scl-70 CECILIA IgG   Date Value Ref Range Status   07/24/2015  0.0 - 0.9 AI Final    <0.2  Negative   Antibody index (AI) values reflect qualitative changes in antibody   concentration that cannot be directly associated with clinical condition or   disease state.       SSA (Ro) (CECILIA) Antibody, IgG   Date Value Ref Range Status   07/24/2015 1.6 (H) 0.0 - 0.9 AI Final     Comment:     Positive   Antibody index (AI) values reflect qualitative changes in antibody   concentration that cannot be directly associated with clinical condition or   disease state.       SSB (La) (CECILIA) Antibody, IgG   Date Value Ref Range Status   07/24/2015  0.0 - 0.9 AI Final    <0.2  Negative   Antibody index (AI) values reflect qualitative changes in antibody   concentration that cannot be directly associated with clinical condition or   disease state.       ,  ,   RG Screen by EIA   Date Value Ref Range Status   07/24/2015 <1.0  Interpretation:  Negative   <1.0 Final   ,  ,  ,  ,  ,  ,  ,  ,  ,  ,  ,  ,  ,  ,  ,  ,  ,  ,   Neutrophil Cytoplasmic IgG Antibody   Date Value Ref Range Status   07/24/2015   Final    <1:20  Reference range: <1:20  (Note)  The ANCA IFA is <1:20; therefore, no further testing will  be performed.  INTERPRETIVE INFORMATION: Anti-Neutrophil Cyto Ab, IgG  Neutrophil Cytoplasmic Antibodies (C-ANCA = granular  cytoplasmic staining, P-ANCA = perinuclear staining) are  found in the serum of over 90 percent of patients with  certain necrotizing systemic vasculitides, and usually in  less than 5 percent of patients with collagen vascular  disease or arthritis.  Performed by [facility],  [address] [place] [id number] [phone]  [email or web address], Burke Mckeon MD, Lab. Director       ,  ,  ,  ,  ,  ,  ,   IGG   Date Value Ref Range Status   07/24/2015 1320 695 - 1620 mg/dL Final   ,  ,  ,  ,  ,   Scleroderma Antibody Scl-70 CECILIA IgG   Date Value Ref Range Status   07/24/2015  0.0 -  0.9 AI Final    <0.2  Negative   Antibody index (AI) values reflect qualitative changes in antibody   concentration that cannot be directly associated with clinical condition or   disease state.          CBC RESULTS: 6/4/2021   Lab Test 06/04/21  1422   WBC 8.6   RBC 4.46   HGB 13.4   HCT 42.1   MCV 94   MCH 30.0   MCHC 31.8   RDW 15.5*        Last Comprehensive Metabolic Panel:  Sodium   Date Value Ref Range Status   11/10/2021 142 133 - 144 mmol/L Final   06/04/2021 137 133 - 144 mmol/L Final     Potassium   Date Value Ref Range Status   11/10/2021 4.2 3.4 - 5.3 mmol/L Final   06/04/2021 4.0 3.4 - 5.3 mmol/L Final     Chloride   Date Value Ref Range Status   11/10/2021 108 94 - 109 mmol/L Final   06/04/2021 106 94 - 109 mmol/L Final     Carbon Dioxide   Date Value Ref Range Status   06/04/2021 25 20 - 32 mmol/L Final     Carbon Dioxide (CO2)   Date Value Ref Range Status   11/10/2021 28 20 - 32 mmol/L Final     Anion Gap   Date Value Ref Range Status   11/10/2021 6 3 - 14 mmol/L Final   06/04/2021 6 3 - 14 mmol/L Final     Glucose   Date Value Ref Range Status   11/10/2021 137 (H) 70 - 99 mg/dL Final   06/04/2021 142 (H) 70 - 99 mg/dL Final     Urea Nitrogen   Date Value Ref Range Status   11/10/2021 13 7 - 30 mg/dL Final   06/04/2021 14 7 - 30 mg/dL Final     Creatinine   Date Value Ref Range Status   12/09/2021 1.05 (H) 0.52 - 1.04 mg/dL Final   06/04/2021 1.11 (H) 0.52 - 1.04 mg/dL Final     GFR Estimate   Date Value Ref Range Status   12/09/2021 50 (L) >60 mL/min/1.73m2 Final     Comment:     As of July 11, 2021, eGFR is calculated by the CKD-EPI creatinine equation, without race adjustment. eGFR can be influenced by muscle mass, exercise, and diet. The reported eGFR is an estimation only and is only applicable if the renal function is stable.   06/04/2021 47 (L) >60 mL/min/[1.73_m2] Final     Comment:     Non  GFR Calc  Starting 12/18/2018, serum creatinine based estimated GFR (eGFR)  will be   calculated using the Chronic Kidney Disease Epidemiology Collaboration   (CKD-EPI) equation.       Calcium   Date Value Ref Range Status   11/10/2021 9.8 8.5 - 10.1 mg/dL Final   06/04/2021 8.8 8.5 - 10.1 mg/dL Final       ESR 6/4/2021: 10.0    CRP 6/4/2021: 3.0    Uric acid 6/4/2021: 6.2

## 2022-02-11 NOTE — NURSING NOTE
Diet: Patient instructed regarding a heart failure healthy diet, including discussion of reduced fat and 2000 mg daily sodium restriction, daily weights, medication purpose and compliance, fluid restrictions and resources for patient and family to access for assistance with heart failure management.       Labs: Patient was given results of the laboratory testing obtained today and patient was instructed about when to return for the next laboratory testing.     Med Reconcile: Reviewed and verified all current medications with the patient. The updated medication list was printed and given to the patient.     Med changes: decrease metoprolol to 50 mg BID    Return Appointment: Patient given instructions regarding scheduling next clinic visit: [facility] in March, Dr Rosa in May    Patient stated she understood all health information given and agreed to call with further questions or concerns.     Flor Velasquez RN

## 2022-02-11 NOTE — NURSING NOTE
Chief Complaint   Patient presents with     RECHECK     follow up arthritis       Blood pressure (!) 148/67, pulse 63, weight 85.3 kg (188 lb), SpO2 99 %, not currently breastfeeding.      Kendra Adams, CMA

## 2022-02-12 LAB
FERRITIN SERPL-MCNC: 14 NG/ML (ref 8–252)
IRON SATN MFR SERPL: 5 % (ref 15–46)
IRON SERPL-MCNC: 17 UG/DL (ref 35–180)
RETICS # AUTO: 0.09 10E6/UL (ref 0.03–0.1)
RETICS/RBC NFR AUTO: 2.7 % (ref 0.5–2)
TIBC SERPL-MCNC: 354 UG/DL (ref 240–430)
VIT B12 SERPL-MCNC: 162 PG/ML (ref 193–986)

## 2022-02-13 DIAGNOSIS — M85.80 OSTEOPENIA, UNSPECIFIED LOCATION: ICD-10-CM

## 2022-02-13 DIAGNOSIS — Z79.52 CURRENT CHRONIC USE OF SYSTEMIC STEROIDS: ICD-10-CM

## 2022-02-14 LAB
HAPTOGLOB SERPL-MCNC: 149 MG/DL (ref 32–197)
PROT ELPH PNL UR ELPH: NORMAL
PROT PATTERN SERPL IFE-IMP: NORMAL

## 2022-02-14 NOTE — TELEPHONE ENCOUNTER
Was out of the office and message routed just to me instead of triage basket.  Pt was seen and had a VV 2/8.  Radha Meek, Sabrina TRAN, [facility]  You 6 days ago     AS    If she can be here by 12:30, yes.   Sabrina Meek, PharmD, JAMES, BCACP   MTM Pharmacist, [facility]     Message text

## 2022-02-15 LAB
ALBUMIN SERPL ELPH-MCNC: 3.9 G/DL (ref 3.7–5.1)
ALPHA1 GLOB SERPL ELPH-MCNC: 0.4 G/DL (ref 0.2–0.4)
ALPHA2 GLOB SERPL ELPH-MCNC: 0.8 G/DL (ref 0.5–0.9)
ATRIAL RATE - MUSE: 52 BPM
B-GLOBULIN SERPL ELPH-MCNC: 1 G/DL (ref 0.6–1)
DIASTOLIC BLOOD PRESSURE - MUSE: NORMAL MMHG
GAMMA GLOB SERPL ELPH-MCNC: 0.8 G/DL (ref 0.7–1.6)
INTERPRETATION ECG - MUSE: NORMAL
M PROTEIN SERPL ELPH-MCNC: 0 G/DL
P AXIS - MUSE: 68 DEGREES
PR INTERVAL - MUSE: 226 MS
PROT PATTERN SERPL ELPH-IMP: NORMAL
QRS DURATION - MUSE: 68 MS
QT - MUSE: 442 MS
QTC - MUSE: 411 MS
R AXIS - MUSE: 26 DEGREES
SYSTOLIC BLOOD PRESSURE - MUSE: NORMAL MMHG
T AXIS - MUSE: 62 DEGREES
VENTRICULAR RATE- MUSE: 52 BPM

## 2022-02-16 RX ORDER — ALENDRONATE SODIUM 70 MG/1
70 TABLET ORAL
Qty: 12 TABLET | Refills: 0 | Status: SHIPPED | OUTPATIENT
Start: 2022-02-16 | End: 2022-05-12

## 2022-02-16 NOTE — TELEPHONE ENCOUNTER
ALENDRONATE SODIUM 70 MG TAB  Last Written Prescription Date:   5/20/2021  Last Fill Quantity: 12,   # refills: 2  Last Office Visit :  2/11/2022  Future Office visit:   7/8/2022    Routing refill request to provider for review/approval because:  Second Request  Needing an updated Dexa scan on file for this med  Refer to Provider for review       Mere Weinberg RN  Central Triage Red Flags/Med Refills

## 2022-02-17 ENCOUNTER — TELEPHONE (OUTPATIENT)
Dept: RHEUMATOLOGY | Facility: CLINIC | Age: 82
End: 2022-02-17
Payer: MEDICARE

## 2022-02-17 NOTE — TELEPHONE ENCOUNTER
----- Message from Zulma Lopez MD sent at 2/12/2022  2:20 AM CST -----  Improved CRP inflammation, GFR kidney function and serum uric acid. However, labs show anemia which could explain the shortness of breath, could you please schedule a follow up with your primary care doctor to discuss?

## 2022-02-17 NOTE — TELEPHONE ENCOUNTER
Called and provided recommendations to pt regarding her anemia.  She will connect with her PCPs office.    Kamille Ruffin RN  Rheumatology Clinic

## 2022-02-18 NOTE — RESULT ENCOUNTER NOTE
--Your hemoglobin A1C (the three month average of your glucose levels) looks very good and stable at 6.4.    Please let me know if you have any questions.  Best,   Lev Tristan MD

## 2022-02-22 NOTE — PROGRESS NOTES
Assessment & Plan       ICD-10-CM    1. Iron deficiency anemia, unspecified iron deficiency anemia type  D50.9 Vitamin B12     ferrous sulfate (FEROSUL) 325 (65 Fe) MG tablet     CBC with platelets and differential     Ferritin     Folate     Reticulocyte count     Vitamin B12     CBC with platelets and differential     Ferritin     Folate     Reticulocyte count     CANCELED: Vitamin B12   2. Vitamin B12 deficiency (non anemic)  E53.8 Vitamin B12     cyanocobalamin (VITAMIN B-12) 1000 MCG tablet     Vitamin B12   3. Dyspnea on exertion  R06.00    4. Fatigue, unspecified type  R53.83    5. Recurrent cold sores  B00.1 acyclovir (ZOVIRAX) 400 MG tablet      Discussed surprising low hgb from 2/11/22 labs with Dr. Lopez, especially with normal/stable hgb up to 12/21.  Unsure full etiology, but with low hgb and abnl low iron, and low ferritin (though this has been low for her in the past a few yrs ago).  Vitamin B12 was also quite low.  Rec supplementing with both iron (325 mg daily) and Vitamin B12 (1000mcg/day).  Do wonder if her recent worsening SOB could be related to anemia.    GI sx's- Pt reports no change in stools, normal brown color, though sometimes can have loose stools.    Stable GERD sx's, on pepcid 20mg twice daily.  Does report occasional sx's that feel like something gets stuck in esophagus occasionally.  Reviewed GI history- h/o diverticulitis with abcess/perforation, surgical repair with sigmoidectomy in ~'11 with Dr. Casas at the time, multiple complications at the time (fluid overload a.fib, CHF, etc).  Pt declined f/u with colonoscopy at that time, and has been wary of scopes since.    ---Rec holding off on scopes for now, but see if iron/B12 supplementation helps sx's/hgb levels.  If not, re-consider scopes.    Cardiology consultation - reviewed recent consultation.  Atypical chest pains- concern for cardiac etiology, but pt declined further w/u (angiogram or stress testing) at this point.  Also  noted SOB with exertion, so lowered toprol to 50mg 2x/day- small change.  Noted swallowing, thought EGD or barium swallow reasonable work-up, she declined.  He changed her medicine- metoprolol to 60mg.nt.      Will see if lower toprol XL through cardiology and iron/B12 supplementation help sx's.    Return in about 1 month (around 3/23/2022) for follow-up anemia.  If worsening in meantime, should be seen sooner.    Ilene Tristan MD  [facility]        Subjective   Sister Betty is a 81 year old who presents for the following health issues  accompanied by her friend  (Yvette)- here to follow-up recent abnormal labs through rheumatology.    HPI     Concern - Discuss 2/11/2022 Lab Results - hemoglobin down from 13's to 9.1 on 2/11/22.    Here today to focus on recent lab results done through rheumatology.  Hgb down in 9.1 on 2/11/22, usually in 12-13's the last couple yrs, last checked in 12/9/21 and it was normal at 13.1.  Ferritin also low at 14, though that has been low in past (18 in 5/17- last check).  B12 also quite low at 162.  Retic count high at 2.7 (<2% normal).  Iron studies abnl with iron at 17, and iron sat low at 5%.  Folate likely unable to be added to the 2/11/22 labs- will check today.  Also noted normal from 2/11/22 labs - haptoglobin, protein electrophoresis, immunofixation ELP (no monoclonal antibody protein seen), nBNP.  CRP slightly high but improved at 12.5 (down from 21 in 12/21), ESR elevated at 36 (normal in 12/21), uric acid at 6.1, down from 7.4 in 12/21.      Someone got her a centrum silver MVI, but unsure dose- doesn't look like doses would be sufficient for iron or B12 (on quick google search, they'll also check when they get home).    Stools- stools she's seen have been normal, brown, no black/tarry/red.  She does sometimes have GERD sx's, takes pepcid. And sometimes feels like food gets stuck in her esophagus.Cardiologist who saw her, said the enlarged heart  may be pushing on her esophagus.  HR was fine with walking.    Reviewed recent 2/11/22 cardiology consultation and recs-   He was concerned about atypical angina- offered angiogram/stress test, but she declined per his note.  Also noted SOB with exertion, so lowered toprol to 50mg 2x/day- small change.  Noted swallowing, thought EGD or barium swallow reasonable work-up, she declined.  He changed her medicine- metoprolol to 60mg.      Review of Systems   Constitutional, HEENT, cardiovascular, pulmonary, gi and gu systems are negative, except as otherwise noted.      Objective    BP (!) 145/74   Pulse 59   Temp 97.1  F (36.2  C)   Wt 85.7 kg (188 lb 14.4 oz)   SpO2 99%   BMI 29.59 kg/m    Body mass index is 29.59 kg/m .  Physical Exam   GENERAL APPEARANCE: healthy, alert and no distress     EYES: PERRL, sclera clear     HENT: nose and mouth without ulcers or lesions     NECK: no adenopathy, no asymmetry, masses, or scars and thyroid normal to palpation     RESP: lungs clear to auscultation - no rales, rhonchi or wheezes     CV: regular rates and rhythm, normal S1 S2, no S3 or S4 and no murmur, click or rub      Abdomen: soft, nontender, no HSM or masses and bowel sounds normal     Ext: warm, dry, no edema     Billing Encounter on 02/11/2022   Component Date Value Ref Range Status     Haptoglobin 02/11/2022 149  32 - 197 mg/dL Final     Vitamin B12 02/11/2022 162 (A) 193 - 986 pg/mL Final     % Reticulocyte 02/11/2022 2.7 (A) 0.5 - 2.0 % Final     Absolute Reticulocyte 02/11/2022 0.090  0.025 - 0.095 10e6/uL Final     Iron 02/11/2022 17 (A) 35 - 180 ug/dL Final     Iron Binding Capacity 02/11/2022 354  240 - 430 ug/dL Final     Iron Sat Index 02/11/2022 5 (A) 15 - 46 % Final     Ferritin 02/11/2022 14  8 - 252 ng/mL Final     Results for orders placed or performed in visit on 02/23/22   Vitamin B12     Status: Abnormal   Result Value Ref Range    Vitamin B12 178 (L) 193 - 986 pg/mL   Ferritin     Status: Normal    Result Value Ref Range    Ferritin 12 8 - 252 ng/mL   Folate     Status: Normal   Result Value Ref Range    Folic Acid 14.6 >=5.4 ng/mL   Reticulocyte count     Status: Abnormal   Result Value Ref Range    % Reticulocyte 2.9 (H) 0.5 - 2.0 %    Absolute Reticulocyte 0.104 (H) 0.025 - 0.095 10e6/uL   CBC with platelets and differential     Status: Abnormal   Result Value Ref Range    WBC Count 8.5 4.0 - 11.0 10e3/uL    RBC Count 3.52 (L) 3.80 - 5.20 10e6/uL    Hemoglobin 9.1 (L) 11.7 - 15.7 g/dL    Hematocrit 30.1 (L) 35.0 - 47.0 %    MCV 86 78 - 100 fL    MCH 25.9 (L) 26.5 - 33.0 pg    MCHC 30.2 (L) 31.5 - 36.5 g/dL    RDW 16.3 (H) 10.0 - 15.0 %    Platelet Count 251 150 - 450 10e3/uL    % Neutrophils 69 %    % Lymphocytes 16 %    % Monocytes 13 %    % Eosinophils 2 %    % Basophils 0 %    Absolute Neutrophils 5.9 1.6 - 8.3 10e3/uL    Absolute Lymphocytes 1.3 0.8 - 5.3 10e3/uL    Absolute Monocytes 1.1 0.0 - 1.3 10e3/uL    Absolute Eosinophils 0.2 0.0 - 0.7 10e3/uL    Absolute Basophils 0.0 0.0 - 0.2 10e3/uL   CBC with platelets and differential     Status: Abnormal    Narrative    The following orders were created for panel order CBC with platelets and differential.  Procedure                               Abnormality         Status                     ---------                               -----------         ------                     CBC with platelets and d...[[id number]]  Abnormal            Final result                 Please view results for these tests on the individual orders.             Answers for HPI/ROS submitted by the patient on 2/23/2022  If you checked off any problems, how difficult have these problems made it for you to do your work, take care of things at home, or get along with other people?: Somewhat difficult  PHQ9 TOTAL SCORE: 7

## 2022-02-23 ENCOUNTER — OFFICE VISIT (OUTPATIENT)
Dept: FAMILY MEDICINE | Facility: CLINIC | Age: 82
End: 2022-02-23
Payer: MEDICARE

## 2022-02-23 VITALS
DIASTOLIC BLOOD PRESSURE: 74 MMHG | WEIGHT: 188.9 LBS | SYSTOLIC BLOOD PRESSURE: 145 MMHG | HEART RATE: 59 BPM | BODY MASS INDEX: 29.59 KG/M2 | OXYGEN SATURATION: 99 % | TEMPERATURE: 97.1 F

## 2022-02-23 DIAGNOSIS — D50.9 IRON DEFICIENCY ANEMIA, UNSPECIFIED IRON DEFICIENCY ANEMIA TYPE: Primary | ICD-10-CM

## 2022-02-23 DIAGNOSIS — R06.09 DYSPNEA ON EXERTION: ICD-10-CM

## 2022-02-23 DIAGNOSIS — B00.1 RECURRENT COLD SORES: ICD-10-CM

## 2022-02-23 DIAGNOSIS — E53.8 VITAMIN B12 DEFICIENCY (NON ANEMIC): ICD-10-CM

## 2022-02-23 DIAGNOSIS — R53.83 FATIGUE, UNSPECIFIED TYPE: ICD-10-CM

## 2022-02-23 LAB
BASOPHILS # BLD AUTO: 0 10E3/UL (ref 0–0.2)
BASOPHILS NFR BLD AUTO: 0 %
EOSINOPHIL # BLD AUTO: 0.2 10E3/UL (ref 0–0.7)
EOSINOPHIL NFR BLD AUTO: 2 %
ERYTHROCYTE [DISTWIDTH] IN BLOOD BY AUTOMATED COUNT: 16.3 % (ref 10–15)
FOLATE SERPL-MCNC: 14.6 NG/ML
HCT VFR BLD AUTO: 30.1 % (ref 35–47)
HGB BLD-MCNC: 9.1 G/DL (ref 11.7–15.7)
LYMPHOCYTES # BLD AUTO: 1.3 10E3/UL (ref 0.8–5.3)
LYMPHOCYTES NFR BLD AUTO: 16 %
MCH RBC QN AUTO: 25.9 PG (ref 26.5–33)
MCHC RBC AUTO-ENTMCNC: 30.2 G/DL (ref 31.5–36.5)
MCV RBC AUTO: 86 FL (ref 78–100)
MONOCYTES # BLD AUTO: 1.1 10E3/UL (ref 0–1.3)
MONOCYTES NFR BLD AUTO: 13 %
NEUTROPHILS # BLD AUTO: 5.9 10E3/UL (ref 1.6–8.3)
NEUTROPHILS NFR BLD AUTO: 69 %
PLATELET # BLD AUTO: 251 10E3/UL (ref 150–450)
RBC # BLD AUTO: 3.52 10E6/UL (ref 3.8–5.2)
RETICS # AUTO: 0.1 10E6/UL (ref 0.03–0.1)
RETICS/RBC NFR AUTO: 2.9 % (ref 0.5–2)
VIT B12 SERPL-MCNC: 178 PG/ML (ref 193–986)
WBC # BLD AUTO: 8.5 10E3/UL (ref 4–11)

## 2022-02-23 PROCEDURE — 99214 OFFICE O/P EST MOD 30 MIN: CPT | Performed by: FAMILY MEDICINE

## 2022-02-23 PROCEDURE — 82728 ASSAY OF FERRITIN: CPT | Performed by: FAMILY MEDICINE

## 2022-02-23 PROCEDURE — 82746 ASSAY OF FOLIC ACID SERUM: CPT | Performed by: FAMILY MEDICINE

## 2022-02-23 PROCEDURE — 85025 COMPLETE CBC W/AUTO DIFF WBC: CPT | Performed by: FAMILY MEDICINE

## 2022-02-23 PROCEDURE — 36415 COLL VENOUS BLD VENIPUNCTURE: CPT | Performed by: FAMILY MEDICINE

## 2022-02-23 PROCEDURE — 82607 VITAMIN B-12: CPT | Performed by: FAMILY MEDICINE

## 2022-02-23 PROCEDURE — 85045 AUTOMATED RETICULOCYTE COUNT: CPT | Performed by: FAMILY MEDICINE

## 2022-02-23 RX ORDER — FERROUS SULFATE 325(65) MG
325 TABLET ORAL
Qty: 30 TABLET | Refills: 1 | Status: SHIPPED | OUTPATIENT
Start: 2022-02-23 | End: 2022-04-14

## 2022-02-23 RX ORDER — ACYCLOVIR 400 MG/1
400 TABLET ORAL 3 TIMES DAILY
Qty: 15 TABLET | Refills: 2 | Status: ON HOLD | OUTPATIENT
Start: 2022-02-23 | End: 2022-09-26

## 2022-02-23 RX ORDER — LANOLIN ALCOHOL/MO/W.PET/CERES
1000 CREAM (GRAM) TOPICAL DAILY
Qty: 30 TABLET | Refills: 1 | Status: ON HOLD | OUTPATIENT
Start: 2022-02-23 | End: 2023-06-01

## 2022-02-23 ASSESSMENT — PATIENT HEALTH QUESTIONNAIRE - PHQ9
SUM OF ALL RESPONSES TO PHQ QUESTIONS 1-9: 7
SUM OF ALL RESPONSES TO PHQ QUESTIONS 1-9: 7
10. IF YOU CHECKED OFF ANY PROBLEMS, HOW DIFFICULT HAVE THESE PROBLEMS MADE IT FOR YOU TO DO YOUR WORK, TAKE CARE OF THINGS AT HOME, OR GET ALONG WITH OTHER PEOPLE: SOMEWHAT DIFFICULT

## 2022-02-24 ENCOUNTER — TELEPHONE (OUTPATIENT)
Dept: CARDIOLOGY | Facility: CLINIC | Age: 82
End: 2022-02-24
Payer: MEDICARE

## 2022-02-24 DIAGNOSIS — I50.30 HEART FAILURE WITH PRESERVED EJECTION FRACTION, UNSPECIFIED HF CHRONICITY (H): Primary | ICD-10-CM

## 2022-02-24 LAB — FERRITIN SERPL-MCNC: 12 NG/ML (ref 8–252)

## 2022-02-24 ASSESSMENT — PATIENT HEALTH QUESTIONNAIRE - PHQ9: SUM OF ALL RESPONSES TO PHQ QUESTIONS 1-9: 7

## 2022-02-25 ENCOUNTER — OFFICE VISIT (OUTPATIENT)
Dept: PALLIATIVE MEDICINE | Facility: CLINIC | Age: 82
End: 2022-02-25
Payer: MEDICARE

## 2022-02-25 VITALS — HEART RATE: 99 BPM | SYSTOLIC BLOOD PRESSURE: 132 MMHG | OXYGEN SATURATION: 61 % | DIASTOLIC BLOOD PRESSURE: 70 MMHG

## 2022-02-25 DIAGNOSIS — M16.11 OSTEOARTHRITIS OF RIGHT HIP, UNSPECIFIED OSTEOARTHRITIS TYPE: Primary | ICD-10-CM

## 2022-02-25 DIAGNOSIS — M70.61 TROCHANTERIC BURSITIS OF RIGHT HIP: ICD-10-CM

## 2022-02-25 DIAGNOSIS — G57.01 PIRIFORMIS SYNDROME, RIGHT: ICD-10-CM

## 2022-02-25 PROCEDURE — 99214 OFFICE O/P EST MOD 30 MIN: CPT | Performed by: PHYSICAL MEDICINE & REHABILITATION

## 2022-02-25 ASSESSMENT — PAIN SCALES - GENERAL: PAINLEVEL: SEVERE PAIN (6)

## 2022-02-25 NOTE — PATIENT INSTRUCTIONS
1. Try scheduling a norco dose with your 2pm medications for a week and see if this stops the pain flares you have been experiencing in the afternoon/evenings. You can have one more tablet to take during the day as needed.    2. Continue PT. I'll message William about hip and gluteal strengthening exercises.    3. I ordered a hip joint injection, you'll be called to schedule this.    Take care,    Charly Moore [facility]      ----------------------------------------------------------------  Clinic Number:  [phone]     Call with any questions about your care and for scheduling assistance.     Calls are returned Monday through Friday between 8 AM and 4:30 PM. We usually get back to you within 2 business days depending on the issue/request.    If we are prescribing your medications:    For opioid medication refills, call the clinic or send a [employer] message 7 days in advance.  Please include:    Name of requested medication    Name of the pharmacy.    For non-opioid medications, call your pharmacy directly to request a refill. Please allow 3-4 days to be processed.     Per [place] State Law:    All controlled substance prescriptions must be filled within 30 days of being written.      For those controlled substances allowing refills, pickup must occur within 30 days of last fill.      We believe regular attendance is key to your success in our program!      Any time you are unable to keep your appointment we ask that you call us at least 24 hours in advance to cancel.This will allow us to offer the appointment time to another patient.     Multiple missed appointments may lead to dismissal from the clinic.

## 2022-02-25 NOTE — PROGRESS NOTES
[facility]    Date of visit: 2/25/22       Assessment:  Sister Betty Buckner is a 81 year old medically complex patient with past medical history including: Atrial fibrillation, VLH, History of DVT, CHF, HLD, HTN, DM 2, Stage 3 CKD, RLS, Depression, History of etoh abuse (remission 30+ years) who presents for evaluation and treatment of the following chronic pain conditions:     1. Right sided hip and leg pain: Betty reports a long standing history of right hip and leg pain. They've been having worsening right lateral hip and groin pain in the last 2-3 months. They also have a long standing history of pain that starts in her right low back/upper buttock and radiates laterally and posteriorly down the right leg to the foot. They had a positive salomón and hip scour and significant right GT bursa tenderness on exam today. Based on this it appears her symptoms are due to a combination of lumbar spondylosis/radiculopathy, piriformis syndrome, Right hip severe OA and right hip bursitis.        Plan:  The following recommendations were given to the patient. Diagnosis, treatment options, risks, benefits, and alternatives were discussed, and all questions were answered. The patient expressed understanding of the plan for management.      I am recommending a multidisciplinary treatment plan to help this patient better manage her pain.  This includes:      1. Physical Therapy: Continue chronic pain PT  2. Clinical Health Pain Psychologist: Coping well defer.  3. Diagnostic Studies: None at this time.  4. Medication Management:   1. Continue Norco 7.5-325mg BID prn.  2. Continue methocarbamol 750mg TID prn.   3. Schedule tylenol 1000mg TID prn while pain is severe.  4. CSA/UDS - 9/2/21  5. Further procedures recommended:   1. Right hip joint injection ordered.  6. Referral: chiropractic referral placed.  7. Follow up: 1 month after hip bursa  injection.      Charly Moore DO  [facility]           Chief complaint:   No chief complaint on file.      Interval history:  Betty Tee is a 80 year old female last seen by me on 1/11/22.        Since her last visit, Betty Tee reports:    -They've continued to have aching pain in the right hip with intermittent flares based on activity and movement. There is a sharp severe pain when they move the hip the wrong way or if they move after they've been sitting for a long time.    -The pain is in the right groin, lateral hip and right buttock.    -Norco does help for hours at a time, they typically take this twice daily.    -The pain worsens around 3-4pm on most days and this is when they take their first dose of norco, another dose later in the evening.    -no improvement with right hip bursa injection.    -[facility]    -They started working with alicia in pain PT.    Pain scores:   Pain intensity on average is 6-7 on a scale of 0-10.        Pain Treatments:  1. Medications:       Current pain medications:  -Tylenol 1000mg q8h prn - Usually takes 1-2 times a day  -Escitalopram 20mg daily   -Norco 7.5-325mg prn - takes it but not regularly   -methocarbamol 750mg tid prn   -Voltaren 1% gel qid prn       Previous pain medications:  -Tramadol 50mg prn  -Tizanidine, flexeril - nh   -Gabapentin 300mg TID - memory difficulty  2. Physical Therapy: hasn't completed PT for low back or hip pain                TENS unit: hasn't tried  3. Pain psychology: hasn't tried  4. Surgery: Lumbar spine surgery in the 1960s.  5. Injections:   -Right piriformis injection on 4/1/21 with 60% relief for 8 weeks. Repeated in June with approx 50% relief.  -Two epidural injections (right L5 tfesi), feb 2020 was helpful the next day but she had a fall right after, aug 2020 procedure - not helpful, had worsening pain  6. Alternative Therapies:                Chiropractic: hasn't tried               Acupuncture: hasn't tried          Side Effects: no side effect    Medications:  Current Outpatient Medications   Medication Sig Dispense Refill     ACCU-CHEK SHANNON PLUS test strip USE TO TEST BLOOD SUGARS 3 TIMES DAILY 300 strip 5     acetaminophen (TYLENOL) 500 MG tablet Take 1,000 mg by mouth every 8 hours as needed (max 6 tablets/24 hours, 2 tablets/dose)        acyclovir (ZOVIRAX) 400 MG tablet Take 1 tablet (400 mg) by mouth 3 times daily For a couple days 15 tablet 2     acyclovir (ZOVIRAX) 5 % external ointment Apply topically 6 times daily As needed for outbreaks 15 g 3     albuterol (PROAIR HFA, PROVENTIL HFA, VENTOLIN HFA) 108 (90 BASE) MCG/ACT inhaler Inhale 2 puffs into the lungs every 6 hours 1 Inhaler 3     alendronate (FOSAMAX) 70 MG tablet Take 1 tablet (70 mg) by mouth every 7 days 12 tablet 0     anakinra (KINERET) 100 MG/0.67ML SOSY injection 100 mg every day x 3 days as needed for flare ups 6.7 mL 3     atorvastatin (LIPITOR) 10 MG tablet TAKE 1/2 TABLET BY MOUTH EVERY DAY 45 tablet 2     azithromycin (ZITHROMAX) 250 MG tablet Take 1 tablet (250 mg) by mouth daily 30 tablet 11     BD PEN NEEDLE JANE 2ND GEN 32G X 4 MM miscellaneous USE 4 DAILY AS DIRECTED. 400 each 1     blood glucose (NO BRAND SPECIFIED) lancets standard Use to test blood sugar 3 times daily or as directed 100 lancet 3     cevimeline (EVOXAC) 30 MG capsule Take 1 capsule (30 mg) by mouth 3 times daily 270 capsule 2     Cholecalciferol (VITAMIN D3) 50 MCG (2000 UT) CAPS Take 1 tablet (50 mcg) by mouth daily - Oral 90 capsule 2     COMPOUNDED NON-CONTROLLED SUBSTANCE (CMPD RX) - PHARMACY TO MIX COMPOUNDED MEDICATION Estriol 1 mg/g Apply small amount to finger and apply to inside vagina daily for 2 weeks then twice weekly Route: vaginally Dispense 30 grams 11 refills (Patient not taking: Reported on 2/8/2022) 30 g 11     cyanocobalamin (VITAMIN B-12) 1000 MCG tablet Take 1 tablet  (1,000 mcg) by mouth daily 30 tablet 1     escitalopram (LEXAPRO) 20 MG tablet TAKE 1 TABLET BY MOUTH EVERY DAY 90 tablet 0     famotidine (PEPCID) 10 MG tablet Take 20 mg by mouth 2 times daily        ferrous sulfate (FEROSUL) 325 (65 Fe) MG tablet Take 1 tablet (325 mg) by mouth daily (with breakfast) 30 tablet 1     fluticasone (FLONASE) 50 MCG/ACT nasal spray Spray 1-2 sprays into both nostrils daily as needed for allergies 18.2 mL 11     fluticasone-vilanterol (BREO ELLIPTA) 100-25 MCG/INH inhaler Inhale 1 puff into the lungs daily 1 Inhaler 11     HYDROcodone-acetaminophen (NORCO) 7.5-325 MG per tablet Take 1 tablet by mouth every 12 hours OK to start and fill 02/07/22 60 tablet 0     hydroxychloroquine (PLAQUENIL) 200 MG tablet Take 2 tablets (400 mg) by mouth daily Get annual eye exams for hydroxychloroquine (Plaquenil) monitoring and fax to [phone] 180 tablet 0     insulin aspart (NOVOLOG FLEXPEN) 100 UNIT/ML pen Inject 11 Units Subcutaneous 3 times daily (with meals) 15 mL 2     insulin glargine (BASAGLAR KWIKPEN) 100 UNIT/ML pen INJECT 22 UNITS SUBCUTANEOUS DAILY (TO REPLACE LANTUS) 15 mL 0     ketoconazole (NIZORAL) 2 % external cream Apply topically 2 times daily as needed for itching 60 g 1     KLOR-CON 20 MEQ CR tablet TAKE 2 TABLETS (40 MEQ) BY MOUTH EVERY MORNING AND 1 TABLET (20 MEQ) EVERY EVENING. 270 tablet 3     lidocaine (LIDODERM) 5 % patch APPLY PATCH TO PAINFUL AREA FOR UP TO 12 H WITHIN A 24 H PERIOD. REMOVE AFTER 12 HOURS. 30 patch 2     loratadine (CLARITIN) 10 MG tablet TAKE 1 TABLET BY MOUTH EVERY DAY 90 tablet 3     methocarbamol (ROBAXIN) 750 MG tablet Take 1 tablet (750 mg) by mouth 3 times daily as needed for muscle spasms 90 tablet 3     metoprolol succinate ER (TOPROL-XL) 50 MG 24 hr tablet Take 1 tablet (50 mg) by mouth 2 times daily 90 tablet 3     montelukast (SINGULAIR) 10 MG tablet TAKE 1 TABLET BY MOUTH EVERYDAY AT BEDTIME 90 tablet 0     predniSONE (DELTASONE) 5 MG  tablet Take 1 tablet (5 mg) by mouth daily 30 tablet 0     rivaroxaban ANTICOAGULANT (XARELTO ANTICOAGULANT) 20 MG TABS tablet TAKE 1 TABLET BY MOUTH DAILY WITH DINNER 90 tablet 3     Semaglutide, 1 MG/DOSE, (OZEMPIC, 1 MG/DOSE,) 4 MG/3ML SOPN Inject 1 mg Subcutaneous once a week 9 mL 1     spironolactone (ALDACTONE) 25 MG tablet Take 2 tablets (50 mg) by mouth daily 180 tablet 3     torsemide (DEMADEX) 10 MG tablet TAKE ONE TAB (10 MG) WITH ONE 20 MG TAB TO = 30 MG DAILY IN AFTERNOON. 90 tablet 3     torsemide (DEMADEX) 20 MG tablet TAKE 2 TABLETS (40 MG) BY MOUTH EVERY MORNING AND 1 TABLET (20 MG) DAILY AT 2 PM. TAKE THE AFTERNOON DOSE WITH AN EXTRA 10 MG TAB FOR A TOTAL OF 30 MG IN THE AFTERNOON 270 tablet 3     traZODone (DESYREL) 50 MG tablet Take 0.5-1.5 tablets (25-75 mg) by mouth nightly as needed for sleep 135 tablet 1       Medical History: any changes in medical history since they were last seen? Diagnosed with anemia, starting iron and b12 replacement.    Review of Systems:  Positive for:  anemia, gout, back pain, arthritis, paresthesias      /70   Pulse 99   SpO2 (!) 61%   Gen: NAD pleasant  Neuro/MSK: Uses fww for ambulation. Right GT bursa tenderness. Positive hip scour and salomón on the right. Strength is 5/5 and symmetric in the lower extremities.        BILLING TIME DOCUMENTATION:   The total TIME spent on this patient on the date of the encounter/appointment was 31 minutes.      TOTAL TIME includes:   Time spent preparing to see the patient (reviewing records and tests)   Time spent face to face (or over the phone) with the patient   Time spent ordering tests, medications, procedures and referrals   Time spent Referring and communicating with other healthcare professionals   Time spent documenting clinical information in Epic           Charly Moore DO  [facility]

## 2022-03-01 ENCOUNTER — PATIENT OUTREACH (OUTPATIENT)
Dept: CARDIOLOGY | Facility: CLINIC | Age: 82
End: 2022-03-01
Payer: MEDICARE

## 2022-03-01 ENCOUNTER — OFFICE VISIT (OUTPATIENT)
Dept: OPHTHALMOLOGY | Facility: CLINIC | Age: 82
End: 2022-03-01
Attending: OPHTHALMOLOGY
Payer: MEDICARE

## 2022-03-01 DIAGNOSIS — Z79.899 HIGH RISK MEDICATION USE: Primary | ICD-10-CM

## 2022-03-01 DIAGNOSIS — M35.02 SJOGREN'S SYNDROME WITH LUNG INVOLVEMENT (H): ICD-10-CM

## 2022-03-01 DIAGNOSIS — H04.123 DRY EYES: ICD-10-CM

## 2022-03-01 PROCEDURE — G0463 HOSPITAL OUTPT CLINIC VISIT: HCPCS | Mod: 25

## 2022-03-01 PROCEDURE — 92083 EXTENDED VISUAL FIELD XM: CPT | Performed by: STUDENT IN AN ORGANIZED HEALTH CARE EDUCATION/TRAINING PROGRAM

## 2022-03-01 PROCEDURE — 92014 COMPRE OPH EXAM EST PT 1/>: CPT | Mod: GC | Performed by: STUDENT IN AN ORGANIZED HEALTH CARE EDUCATION/TRAINING PROGRAM

## 2022-03-01 PROCEDURE — 92015 DETERMINE REFRACTIVE STATE: CPT | Mod: GY

## 2022-03-01 PROCEDURE — 92134 CPTRZ OPH DX IMG PST SGM RTA: CPT | Performed by: STUDENT IN AN ORGANIZED HEALTH CARE EDUCATION/TRAINING PROGRAM

## 2022-03-01 PROCEDURE — 92250 FUNDUS PHOTOGRAPHY W/I&R: CPT | Mod: 59 | Performed by: STUDENT IN AN ORGANIZED HEALTH CARE EDUCATION/TRAINING PROGRAM

## 2022-03-01 RX ORDER — LANOLIN ALCOHOL/MO/W.PET/CERES
1000 CREAM (GRAM) TOPICAL DAILY
COMMUNITY
End: 2022-04-27

## 2022-03-01 ASSESSMENT — REFRACTION_WEARINGRX
OS_SPHERE: +0.25
OD_ADD: +2.50
OS_CYLINDER: +0.25
OS_ADD: +2.75
SPECS_TYPE: BIFOCAL
OD_SPHERE: -0.25
OD_CYLINDER: +0.75
OS_AXIS: 148
OD_AXIS: 170

## 2022-03-01 ASSESSMENT — REFRACTION_MANIFEST
OS_CYLINDER: SPHERE
OS_SPHERE: PLANO
OS_ADD: +3.00
OD_ADD: +3.00
OD_AXIS: 175
OD_SPHERE: PLANO
OD_CYLINDER: +0.50

## 2022-03-01 ASSESSMENT — CONF VISUAL FIELD
OS_NORMAL: 1
OD_NORMAL: 1

## 2022-03-01 ASSESSMENT — VISUAL ACUITY
OS_CC: 20/25
METHOD: SNELLEN - LINEAR
OD_CC+: SLOW
OD_CC: 20/20
OS_CC+: -2
CORRECTION_TYPE: GLASSES

## 2022-03-01 ASSESSMENT — TONOMETRY
OD_IOP_MMHG: 12
IOP_METHOD: TONOPEN
OS_IOP_MMHG: 13

## 2022-03-01 ASSESSMENT — CUP TO DISC RATIO
OD_RATIO: 0.3
OS_RATIO: 0.3

## 2022-03-01 ASSESSMENT — SLIT LAMP EXAM - LIDS
COMMENTS: NORMAL, DECREASED TEAR LAKE
COMMENTS: NORMAL, DECREASED TEAR LAKE

## 2022-03-01 ASSESSMENT — EXTERNAL EXAM - LEFT EYE: OS_EXAM: NORMAL

## 2022-03-01 NOTE — NURSING NOTE
"Chief Complaints and History of Present Illnesses   Patient presents with     Annual Eye Exam     Chief Complaint(s) and History of Present Illness(es)     Annual Eye Exam     Laterality: both eyes    Associated symptoms: Negative for eye pain, headache, flashes and floaters              Comments     Pt here for annual diabetic eye exam. Pt notes crusting of the left eye in the morning- denies pain. Pt mentions a \"blister and then the eye turns red.\"  04/11/2019 pt started plaquenil. Vision is over all stable, but having more trouble with computer and reading.   today.  Lab Results       Component                Value               Date                       A1C                      6.4                 02/11/2022                 A1C                      6.3                 12/09/2021                 A1C                      7.1                 06/04/2021                 A1C                      8.0                 03/03/2021                 A1C                      8.7                 10/20/2020                 A1C                      6.8                 06/19/2020                 A1C                      7.0                 01/21/2020              Pt uses drops intermittently.   TIFFANY COYLE 10:43 AM March 1, 2022                   "

## 2022-03-01 NOTE — PROGRESS NOTES
HPI  Betty Tee is a 81 year old female with Sjogren's syndrome, PMR who presents for plaquenil screening.    Vision overall stable. Wakes up in the morning with matted lids, uses hot packs to looses up mucoid discharge left eye> right eye , with redness left eye. Uses CPAP. Uses AT several times per week.  No flashes/floaters.      POH: CE/IOL [facility] >5 years ago, Sjogren syndrome with dry eye  PMH: Type 2 DM, A fib on coumadin, ANGELICA on CPAP  FHx: Glaucoma in sister and uncle    Assessment & Plan      #High risk medication use (Plaquenil)    - For PMR   - Plaquenil start 4/2019, 400mg at bedtime (4.71 mg/kg)  - Also Pred 5mg daily  - VF 10-2 (s/p dilation, 3/2022)   right eye:superior temporal and inferior temporal loss, reliable   left eye: superior loss, reliable  - FAF   right eye: tr stippled perifoveal hyperautofluoresence   left eye: normal  - OCT Mac   right eye: hyperreflectivity in OPL, otherwise normal   left eye: normal    No toxicity, continue annual monitoring    #Sjogren's syndrome  #Dry eyes  - AT ointment at bedtime, AT QID, WC BID       -----------------------------------------------------------------------------------    Patient disposition:   Return in about 4 weeks (around 3/29/2022) for VT only, MAC TOP. or sooner as needed.        Cristy Charles MD  Ophthalmology PGY-4  [facility]        Attending Physician Attestation:  Complete documentation of historical and exam elements from today's encounter can be found in the full encounter summary report (not reduplicated in this progress note).  I personally obtained the chief complaint(s) and history of present illness.  I confirmed and edited as necessary the review of systems, past medical/surgical history, family history, social history, and examination findings as documented by others; and I examined the patient myself.  I personally reviewed the relevant tests, images, and reports as documented above.   I formulated and edited as necessary the assessment and plan and discussed the findings and management plan with the patient and family. Attending Physician Image/Tesing Attestation: I personally reviewed the ophthalmic test(s) associated with this encounter, agree with the interpretation(s) as documented by the resident/fellow, and have edited the corresponding report(s) as necessary.  . - Gm Acevedo MD

## 2022-03-02 ENCOUNTER — TELEPHONE (OUTPATIENT)
Dept: CARDIOLOGY | Facility: CLINIC | Age: 82
End: 2022-03-02
Payer: MEDICARE

## 2022-03-02 NOTE — TELEPHONE ENCOUNTER
Returned Sister Randal's call and let her know that she should reach out to her provider that follows her hemoglobin to place an order and she should be able to get it drawn with her other labs on 3/4.    Yanique Mckeon RN

## 2022-03-02 NOTE — TELEPHONE ENCOUNTER
[facility] Call Center    Phone Message    May a detailed message be left on voicemail: yes     Reason for Call: Other: Patient has anemia and has been on medication to help, delfino requesting to have her Hemoglobin drawn when she comes in to see Dr Bliss & have her other labs drawn 3/4/22     Action Taken: Other: cardiology    Travel Screening: Not Applicable

## 2022-03-04 ENCOUNTER — LAB (OUTPATIENT)
Dept: LAB | Facility: CLINIC | Age: 82
End: 2022-03-04
Payer: MEDICARE

## 2022-03-04 ENCOUNTER — TELEPHONE (OUTPATIENT)
Dept: FAMILY MEDICINE | Facility: CLINIC | Age: 82
End: 2022-03-04

## 2022-03-04 ENCOUNTER — OFFICE VISIT (OUTPATIENT)
Dept: CARDIOLOGY | Facility: CLINIC | Age: 82
End: 2022-03-04
Attending: NURSE PRACTITIONER
Payer: MEDICARE

## 2022-03-04 VITALS
OXYGEN SATURATION: 98 % | SYSTOLIC BLOOD PRESSURE: 104 MMHG | DIASTOLIC BLOOD PRESSURE: 66 MMHG | HEART RATE: 59 BPM | WEIGHT: 192 LBS | HEIGHT: 66 IN | BODY MASS INDEX: 30.86 KG/M2

## 2022-03-04 DIAGNOSIS — D50.9 IRON DEFICIENCY ANEMIA, UNSPECIFIED IRON DEFICIENCY ANEMIA TYPE: ICD-10-CM

## 2022-03-04 DIAGNOSIS — G89.29 OTHER CHRONIC PAIN: ICD-10-CM

## 2022-03-04 DIAGNOSIS — I48.21 PERMANENT ATRIAL FIBRILLATION (H): ICD-10-CM

## 2022-03-04 DIAGNOSIS — K21.9 GASTROESOPHAGEAL REFLUX DISEASE WITHOUT ESOPHAGITIS: ICD-10-CM

## 2022-03-04 DIAGNOSIS — I50.30 HEART FAILURE WITH PRESERVED EJECTION FRACTION, UNSPECIFIED HF CHRONICITY (H): ICD-10-CM

## 2022-03-04 DIAGNOSIS — I50.32 CHRONIC HEART FAILURE WITH PRESERVED EJECTION FRACTION (H): Primary | ICD-10-CM

## 2022-03-04 DIAGNOSIS — Z87.19 HISTORY OF LOWER GI BLEEDING: Primary | ICD-10-CM

## 2022-03-04 DIAGNOSIS — I10 HYPERTENSION, UNSPECIFIED TYPE: ICD-10-CM

## 2022-03-04 DIAGNOSIS — M16.11 OSTEOARTHRITIS OF RIGHT HIP, UNSPECIFIED OSTEOARTHRITIS TYPE: ICD-10-CM

## 2022-03-04 LAB
ANION GAP SERPL CALCULATED.3IONS-SCNC: 8 MMOL/L (ref 3–14)
BASOPHILS # BLD AUTO: 0 10E3/UL (ref 0–0.2)
BASOPHILS NFR BLD AUTO: 0 %
BUN SERPL-MCNC: 15 MG/DL (ref 7–30)
CALCIUM SERPL-MCNC: 9.2 MG/DL (ref 8.5–10.1)
CHLORIDE BLD-SCNC: 109 MMOL/L (ref 94–109)
CO2 SERPL-SCNC: 26 MMOL/L (ref 20–32)
CREAT SERPL-MCNC: 0.99 MG/DL (ref 0.52–1.04)
EOSINOPHIL # BLD AUTO: 0.1 10E3/UL (ref 0–0.7)
EOSINOPHIL NFR BLD AUTO: 2 %
ERYTHROCYTE [DISTWIDTH] IN BLOOD BY AUTOMATED COUNT: 19.9 % (ref 10–15)
FERRITIN SERPL-MCNC: 36 NG/ML (ref 8–252)
GFR SERPL CREATININE-BSD FRML MDRD: 57 ML/MIN/1.73M2
GLUCOSE BLD-MCNC: 131 MG/DL (ref 70–99)
HCT VFR BLD AUTO: 33.6 % (ref 35–47)
HGB BLD-MCNC: 9.6 G/DL (ref 11.7–15.7)
IMM GRANULOCYTES # BLD: 0.1 10E3/UL
IMM GRANULOCYTES NFR BLD: 1 %
LYMPHOCYTES # BLD AUTO: 0.9 10E3/UL (ref 0.8–5.3)
LYMPHOCYTES NFR BLD AUTO: 10 %
MCH RBC QN AUTO: 25.7 PG (ref 26.5–33)
MCHC RBC AUTO-ENTMCNC: 28.6 G/DL (ref 31.5–36.5)
MCV RBC AUTO: 90 FL (ref 78–100)
MONOCYTES # BLD AUTO: 0.9 10E3/UL (ref 0–1.3)
MONOCYTES NFR BLD AUTO: 10 %
NEUTROPHILS # BLD AUTO: 6.9 10E3/UL (ref 1.6–8.3)
NEUTROPHILS NFR BLD AUTO: 77 %
NRBC # BLD AUTO: 0 10E3/UL
NRBC BLD AUTO-RTO: 0 /100
PLATELET # BLD AUTO: 250 10E3/UL (ref 150–450)
POTASSIUM BLD-SCNC: 4.3 MMOL/L (ref 3.4–5.3)
RBC # BLD AUTO: 3.73 10E6/UL (ref 3.8–5.2)
SODIUM SERPL-SCNC: 143 MMOL/L (ref 133–144)
WBC # BLD AUTO: 9 10E3/UL (ref 4–11)

## 2022-03-04 PROCEDURE — 85025 COMPLETE CBC W/AUTO DIFF WBC: CPT | Performed by: PATHOLOGY

## 2022-03-04 PROCEDURE — 36415 COLL VENOUS BLD VENIPUNCTURE: CPT | Performed by: PATHOLOGY

## 2022-03-04 PROCEDURE — 99214 OFFICE O/P EST MOD 30 MIN: CPT | Performed by: NURSE PRACTITIONER

## 2022-03-04 PROCEDURE — 80048 BASIC METABOLIC PNL TOTAL CA: CPT | Performed by: PATHOLOGY

## 2022-03-04 PROCEDURE — G0463 HOSPITAL OUTPT CLINIC VISIT: HCPCS

## 2022-03-04 PROCEDURE — 82728 ASSAY OF FERRITIN: CPT | Performed by: PATHOLOGY

## 2022-03-04 RX ORDER — CHOLECALCIFEROL (VITAMIN D3) 50 MCG
TABLET ORAL
COMMUNITY
Start: 2022-03-01 | End: 2022-04-27

## 2022-03-04 RX ORDER — HYDROCODONE BITARTRATE AND ACETAMINOPHEN 7.5; 325 MG/1; MG/1
1 TABLET ORAL EVERY 12 HOURS
Qty: 60 TABLET | Refills: 0 | Status: SHIPPED | OUTPATIENT
Start: 2022-03-04 | End: 2022-03-30

## 2022-03-04 ASSESSMENT — PAIN SCALES - GENERAL: PAINLEVEL: NO PAIN (0)

## 2022-03-04 NOTE — PATIENT INSTRUCTIONS
Take your medicines every day, as directed    Changes made today:  o Check your BP at home.  o RN will call in 1 week.   Monitor Your Weight and Symptoms    Contact us if you:      Gain 2 pounds in one day or 5 pounds in one week    Feel more short of breath    Notice more leg swelling    Feel lightheadeded   Change your lifestyle    Limit Salt or Sodium:    2000 mg  Limit Fluids:    2000 mL or approximately 64 ounces  Eat a Heart Healthy Diet    Low in saturated fats  Stay Active:    Aim to move at least 150 minutes every  week         To Contact us    During Business Hours:  [phone], option # 1      After hours, weekends or holidays:   [phone], Option #4  Ask to speak to the On-Call Cardiologist. Inform them you are a CORE/heart failure patient at the [facility].     Use [employer] allows you to communicate directly with your heart team through secure messaging.    payever can be accessed any time on your phone, computer, or tablet.    If you need assistance, we'd be happy to help!         Keep your Heart Appointments:    Follow up with [facility] TBD

## 2022-03-04 NOTE — TELEPHONE ENCOUNTER
Reason for call:  Medication   If this is a refill request, has the caller requested the refill from the pharmacy already? No  Will the patient be using a [facility]? No  Name of the pharmacy and phone number for the current request: [facility]ACY #[id number] - [place], [place] - [address]    Name of the medication requested: HYDROcodone-acetaminophen (NORCO) 7.5-325 MG per tablet  methocarbamol (ROBAXIN) 750 MG tablet    Phone number to reach patient:  Home number on file [phone] (home)    Can we leave a detailed message on this number?  YES    Travel screening: Not Applicable          Jovanni Arora    [facility] [place]

## 2022-03-04 NOTE — TELEPHONE ENCOUNTER
Triage,   Sister Betty called.   States [place] [facility] [place] CW needs to place new orders for stool sample - FIT.   Please advise.   Thanks!  Jocelyn BARFIELD

## 2022-03-04 NOTE — TELEPHONE ENCOUNTER
Received call from patient requesting refill(s) of  HYDROcodone-acetaminophen (NORCO) 7.5-325 MG per tablet  Patient has refills available on Methocarbamol. Voicemail left for patient.    Last dispensed from pharmacy on 02/07/22-[facility]    Patient's last office/virtual visit by prescribing provider on 02/25/22  Next office/virtual appointment scheduled for : injection only    Last urine drug screen date 09/02/21  Current opioid agreement on file (completed within the last year) Yes Date of opioid agreement: 10/04/21    [facility]-prescribe to pharmacy[id number] [place] [facility] [address]    Will route to nursing [place] for review and preparation of prescription(s).

## 2022-03-04 NOTE — NURSING NOTE
Chief Complaint   Patient presents with     Follow Up     Return Lab prior   Vitals were taken and medications reconciled.    Ronak Delvalle, EMT  1:39 PM

## 2022-03-04 NOTE — PROGRESS NOTES
Assessment & Plan       ICD-10-CM    1. Iron deficiency anemia, unspecified iron deficiency anemia type  D50.9 Ferritin     Lab Blood Morphology Pathologist Review     Ferritin     CANCELED: CBC with platelets   2. History of lower GI bleeding  Z87.19    3. Diverticulitis of colon  K57.32    4. Type 2 diabetes mellitus with hyperglycemia, with long-term current use of insulin (H)  E11.65 insulin glargine (BASAGLAR KWIKPEN) 100 UNIT/ML pen    Z79.4    5. Gastroesophageal reflux disease without esophagitis  K21.9    6. Seasonal allergic rhinitis due to pollen  J30.1    7. Osteoarthritis of right hip, unspecified osteoarthritis type  M16.11    8. Inflammatory arthritis  M19.90       CASEY/GERD, h/o GI bleed, h/o diverticulitis- unknown cause, but found to have sudden drop in hgb from ~12/21 to 2/22 labs.    Fatigue, but no other GI or other sx's.  Found to have low iron/ferritin/B12, so iron/B12 supplementation started ~2/23/22.    Later found to have positive occult fecal testing (and positive FIT test from 3/20 - not previously seen).    ~3/7/22, reviewed chart, noted that pt had been changed from PPI to pepcid in the past year, so switched back to pantoprazole 40mg/d.  --Hgb has improved a bit in just a couple weeks on supplements (and later on PPI), from 9.1 to 9.6, and today hgb up to 11.1.    --Pt states energy is notably better.  Stools now dark on the iron.  --Pt has appt with Dr. Diaz on 4/19/22 for his review and recommendations given her extensive GI PMH now almost a decade ago.  She mostly saw Dr. Sorto during that time, but also saw Dr. Santana.    DM II- pt notes mild hypoglycemia sx's, infrequently, with glucose levels in 70s. Last couple A1c's in low 6's.    Will cont ozempic and mealtime novolog at current doses, but will lower basaglar from 32 units daily to 28 units daily.    Seasonal allergies- worse sx's lately.  Has been taking claritin and singulair daily, but flonase just every other day.  Rec  increasing flonase to twice daily.    Follicular bronchitis with Sjogrens-  Also checked in on her BREO use, and she doesn't think she's been on it lately.  Reviewed pulmonary recs- should be taking daily- reminded pt.    Inflammatory arthritis/chronic pain-  Noted recent R hip injection with Dr. Moore was incredibly painful, but thankfully notes that her hip area pain has been improved since the injection.      Return in about 2 months (around 5/23/2022) for Routine Visit/Chronic Cares/Med Check, Diabetes/A1C (consider hgb recheck in ~3 wks).    Ilene Tristan MD  [facility]          Subjective   Sister Betty is a 81 year old who presents for the following health issues  accompanied by her friend.Nghia- for follow-up anemia and other issues    History of Present Illness       Diabetes:   She presents for follow up of diabetes.  She is checking home blood glucose three times daily. She checks blood glucose before meals.  Blood glucose is never over 200 and never under 70. She is aware of hypoglycemia symptoms including shakiness, dizziness and weakness. She is concerned about other. She is having burning in feet and excessive thirst.         Heart Failure:  She presents for follow up of heart failure. She is experiencing shortness of breath with activity only, She is not experiencing any lower extremity edema.   She denies orthopenea and is not coughing at night. Patient is checking weight daily. She has recently had a None. She has side effects from medications including other. She has had no other medical visits for heart failure since the last visit.    Reason for visit:   Requested it  Symptom onset:  1-2 weeks ago  Symptoms include:  Anemia  Symptom intensity:  Severe  Symptom progression:  Staying the same  Had these symptoms before:  No  What makes it worse:  Exertion  What makes it better:  Movement    She eats 2-3 servings of fruits and vegetables daily.She consumes 0  sweetened beverage(s) daily.She exercises with enough effort to increase her heart rate 10 to 19 minutes per day.  She exercises with enough effort to increase her heart rate 3 or less days per week.   She is taking medications regularly.     At last visit on 2/23/22, reviewed suprisingly low hgb on rheumatology labs (hgb down to low 9's, down from nl/stable hgb in 12/21).  Vit B12 levels were also low.  Rec iron and B12 supplementation.    Also see TE from 3/4/22-  --Reviewed hgb improvement from 9.1 to 9.6 (after just a couple weeks of iron/B12).  --Also reviewed chart- had positive FIT in 3/20 during hospital stay- never discussed/addressed.  No stool recheck, so ordered occult testing, but was not done at lab until later).  Eventually tested and positive.  --Also reviewed GERD/ulcer meds.  She had been on pantoprazole for yrs, unsure when or who stopped it.  Pepcid was started 1x/d in 8/21, and 2x/day in 12/21.  Reviewed chart- no visits on those dates, no discussion of the medication around those months, unfortunately.  Regardless, restarted pt on pantoprazole on 3/7/21 after discussing with her on the phone (and stopped the pepcid).    TE from 3/14/22-   -Reviewed the positive fecal occult blood test, and referred her to colorectal - consult to discuss next steps given her extensive GI PMH.  --They called to see if she could schedule with Dr. Santana, but they were told he was retiring at the end of May, and his schedule was filled up.  Asked if he would let them know who he thought she should see, and they called her back and said he would see her on 4/19/22.  Very thankful.  If she needs scope/surgery, he'll then refer her on.        Now has been on the iron/B12 for almost a month, and back on the PPI for almost two weeks.    Current symptoms--  Feels better than last time- not dragging as much.  Curious to see her hgb today.    She is waking up ~2-3am and having a bowel movement, which is unusual for her.   Still just once daily- just at night.  Dark - since getting on the iron.  No straining, formed.        DM II-   Couple bouts of 'low blood sugar' in the morning.  Couple around 70.  Can feel it, has orange juice.  She is also noting that she feels really hungry just ~30 minutes after eating.  A1C in 6's the last couple times.  12/1/20- basaglar increased from 22 to 25.  1/20/21- increased to 32    A1c's were ~8 at those times.        Allergies- symptoms much worse lately.  Thinks she needs adjustments on allergy meds- nose is dripping, sneezing.  Flonase- using it every other day- will try twice daily.  Will continue the claritin daily.      R hip pain--  Pain- Dr. Moore- went into hip joint, excruciatingly painful injection, but it's helped.  Hasn't taken the pain pills during the day, still needing them at night.  Injection was on 3/9/22.          Review of Systems   Constitutional, HEENT, cardiovascular, pulmonary, GI, , musculoskeletal, neuro, skin, endocrine and psych systems are negative, except as otherwise noted.          Objective    /57   Pulse 65   Temp 97.1  F (36.2  C) (Temporal)   Wt 87.1 kg (192 lb 1.6 oz)   SpO2 99%   BMI 30.76 kg/m    Body mass index is 30.76 kg/m .  Physical Exam   GENERAL APPEARANCE: healthy, alert and no distress     EYES: sclera clear, EOMI     RESP: lungs clear to auscultation - no rales, rhonchi or wheezes     CV: regular rates and rhythm, normal S1 S2, no S3 or S4 and no murmur, click or rub      Ext: warm, dry, no edema       Psych: full range affect, normal speech and grooming, judgement and insight intact     Lab on 03/11/2022   Component Date Value Ref Range Status     Occult Blood Hgb FIT 03/11/2022 Positive (A) Negative Final     Results for orders placed or performed in visit on 03/23/22   CBC with platelets and differential     Status: Abnormal   Result Value Ref Range    WBC Count 10.2 4.0 - 11.0 10e3/uL    RBC Count 4.02 3.80 - 5.20 10e6/uL     Hemoglobin 11.3 (L) 11.7 - 15.7 g/dL    Hematocrit 36.4 35.0 - 47.0 %    MCV 91 78 - 100 fL    MCH 28.1 26.5 - 33.0 pg    MCHC 31.0 (L) 31.5 - 36.5 g/dL    RDW 23.0 (H) 10.0 - 15.0 %    Platelet Count 229 150 - 450 10e3/uL    % Neutrophils 78 %    % Lymphocytes 10 %    % Monocytes 10 %    % Eosinophils 2 %    % Basophils 1 %    Absolute Neutrophils 7.9 1.6 - 8.3 10e3/uL    Absolute Lymphocytes 1.1 0.8 - 5.3 10e3/uL    Absolute Monocytes 1.0 0.0 - 1.3 10e3/uL    Absolute Eosinophils 0.2 0.0 - 0.7 10e3/uL    Absolute Basophils 0.1 0.0 - 0.2 10e3/uL    Narrative       Lab Blood Morphology Pathologist Review     Status: Abnormal (In process)    Narrative    The following orders were created for panel order Lab Blood Morphology Pathologist Review.  Procedure                               Abnormality         Status                     ---------                               -----------         ------                     Bld morphology pathology...[[id number]]                      In process                 CBC with platelets and d...[[id number]]  Abnormal            Final result               Reticulocyte count[[id number]]                               In process                 Morphology Tracking[[id number]]                              In process                   Please view results for these tests on the individual orders.     Results for orders placed or performed in visit on 03/23/22 (from the past 24 hour(s))   Lab Blood Morphology Pathologist Review    Narrative    The following orders were created for panel order Lab Blood Morphology Pathologist Review.  Procedure                               Abnormality         Status                     ---------                               -----------         ------                     Bld morphology pathology...[[id number]]                      In process                 CBC with platelets and d...[[id number]]  Abnormal            Final result               Reticulocyte  count[[id number]]                               In process                 Morphology Tracking[[id number]]                              In process                   Please view results for these tests on the individual orders.   CBC with platelets and differential   Result Value Ref Range    WBC Count 10.2 4.0 - 11.0 10e3/uL    RBC Count 4.02 3.80 - 5.20 10e6/uL    Hemoglobin 11.3 (L) 11.7 - 15.7 g/dL    Hematocrit 36.4 35.0 - 47.0 %    MCV 91 78 - 100 fL    MCH 28.1 26.5 - 33.0 pg    MCHC 31.0 (L) 31.5 - 36.5 g/dL    RDW 23.0 (H) 10.0 - 15.0 %    Platelet Count 229 150 - 450 10e3/uL    % Neutrophils 78 %    % Lymphocytes 10 %    % Monocytes 10 %    % Eosinophils 2 %    % Basophils 1 %    Absolute Neutrophils 7.9 1.6 - 8.3 10e3/uL    Absolute Lymphocytes 1.1 0.8 - 5.3 10e3/uL    Absolute Monocytes 1.0 0.0 - 1.3 10e3/uL    Absolute Eosinophils 0.2 0.0 - 0.7 10e3/uL    Absolute Basophils 0.1 0.0 - 0.2 10e3/uL    Narrative    Tech Comments         *Note: Due to a large number of results and/or encounters for the requested time period, some results have not been displayed. A complete set of results can be found in Results Review.

## 2022-03-04 NOTE — LETTER
"3/4/2022      RE: [facility] Tee  [address] [facility] [address]       Dear Colleague,    Thank you for the opportunity to participate in the care of your patient, Betty Tee, at the [facility] [place] at [facility]. Please see a copy of my visit note below.      HPI: Betty is a 81 year old White female with a past medical history of hypertension, atrial fibrillation, Sjogren's syndrome and interstitial lung disease    The patient is here with her healthcare \"assitant\".  Her metoprolol was lowered by Dr. Moreno a few weeks ago ( due to bradycardia). She has some SOB at rest but not frequent. KIMBLE is present with any activity. Her PCP has been following her with the anemia. She was fine in December. She has no swelling in the LE. Her weight at home 187 lbs. Appetite has increased as well. She takes 40 mg am/30 mg in the afternoon of Torsemide.     Denies SOB, PND, orthopnea, edema, weight gain, chest pain, palpitations, lightheadedness, dizziness, near syncopal/syncopal episodes. Betty has been following salt and fluid restrictions.      PAST MEDICAL HISTORY:  Past Medical History:   Diagnosis Date     Alcohol abuse, in remission      Allergic rhinitis, cause unspecified     allegra helps when she takes it     Antiplatelet or antithrombotic long-term use      Atrial fibrillation (H)     in hosp in 11/11 after surgery w/ fluid overload     Cardiomegaly     LVH on stress echo- cardiac w/u at [facility] ER- neg CT scan for PE, neg stress echo in 8/06     Chest pain, unspecified      Congestive heart failure (H)      Depressive disorder      Diabetes (H)      Disorder of bone and cartilage, unspecified     osteopenia (had been on prempro), improved on 6/06 dexa, stable dexa 11/10     Diverticulosis of colon (without mention of hemorrhage)     last episode yrs ago     Essential hypertension, benign      Follicular bronchiolitis (H)     " associated with Sjogrens, dx by chest CT showing mosaic attenuation and air trapping     Gastro-oesophageal reflux disease      ILD (interstitial lung disease) (H)     associated with Sjogrens, also has mildly elevated IgG4, first noted on chest CT 2015 (mild changes) and also has small airways disease; ILD improved on follow up chest CT 2018.     Insomnia, unspecified     weaned off clonazepam     Irregular heart beat      Lumbago 7/09    MRI with NEAL, now seeing Dr. Cain for sciatic sx's     Major depressive disorder, recurrent episode, moderate (H)      Obstructive sleep apnea     uses cpap     Osteoarthrosis, unspecified whether generalized or localized, unspecified site      Sjogren's syndrome (H)     + RG and SSA and lip bx     Sleep apnea      Tobacco use disorder     chantix in 9/07, started again in 6/08, working       FAMILY HISTORY:  Family History   Problem Relation Age of Onset     C.A.D. Mother 63        MI- first at age 63     Heart Disease Mother      Hypertension Mother      Cerebrovascular Disease Mother      Hyperlipidemia Mother      Coronary Artery Disease Mother         MI-first at age 63     Other - See Comments Mother         cerebrovascular disease      Alcohol/Drug Father      Alzheimer Disease Father      Dementia Father      Hypertension Father      Hyperlipidemia Father      Substance Abuse Father      Diabetes Sister      Hypertension Sister      Hyperlipidemia Sister      Substance Abuse Sister      Asthma Sister      C.A.D. Sister 52        Minor MI- age 50's     Heart Disease Sister      Hypertension Sister      Hypertension Sister      Hypertension Brother      Cancer - colorectal Sister 48        Late 40's early 50's     Prostate Cancer Brother 74        Dx'd age 74     Gastrointestinal Disease Sister         Diverticulitis     Gastrointestinal Disease Brother         Diverticulitis     Lipids Sister      Lipids Sister      Parkinsonism Brother      Diabetes Sister      Heart  Disease Sister         CHF     Cancer Sister         lung, smoker     Substance Abuse Sister      Substance Abuse Brother      Asthma Sister      Cancer Sister      Breast Cancer Daughter      Prostate Cancer Brother      Hyperlipidemia Brother      Diabetes Other      Hypertension Other      Coronary Artery Disease Sister         minor MI-age 50's     Heart Disease Sister      Hypertension Sister      Hypertension Brother      Hypertension Sister      Colon Cancer Sister      Prostate Cancer Brother      Diverticulitis Sister      Diverticulitis Brother      Parkinsonism Brother      Lung Cancer Sister      Breast Cancer Daughter      Heart Disease Sister         CHF     Diabetes Sister      Asthma Sister        SOCIAL HISTORY:  Social History     Tobacco Use     Smoking status: Former Smoker     Packs/day: 0.50     Years: 10.00     Pack years: 5.00     Types: Cigarettes     Quit date: 8/1/2011     Years since quitting: 10.5     Smokeless tobacco: Never Used     Tobacco comment: 1/2 ppd   Substance Use Topics     Alcohol use: No     Alcohol/week: 0.0 standard drinks     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     Drug use: No           CURRENT MEDICATIONS:  Current Outpatient Medications   Medication Sig Dispense Refill     ACCU-CHEK SHANNON PLUS test strip USE TO TEST BLOOD SUGARS 3 TIMES DAILY 300 strip 5     acetaminophen (TYLENOL) 500 MG tablet Take 1,000 mg by mouth every 8 hours as needed (max 6 tablets/24 hours, 2 tablets/dose)        acyclovir (ZOVIRAX) 400 MG tablet Take 1 tablet (400 mg) by mouth 3 times daily For a couple days 15 tablet 2     acyclovir (ZOVIRAX) 5 % external ointment Apply topically 6 times daily As needed for outbreaks 15 g 3     albuterol (PROAIR HFA, PROVENTIL HFA, VENTOLIN HFA) 108 (90 BASE) MCG/ACT inhaler Inhale 2 puffs into the lungs every 6 hours 1 Inhaler 3     alendronate (FOSAMAX) 70 MG tablet Take 1 tablet (70 mg) by mouth every 7 days 12 tablet 0     anakinra (KINERET) 100  MG/0.67ML SOSY injection 100 mg every day x 3 days as needed for flare ups 6.7 mL 3     artificial tears OINT ophthalmic ointment Place 1 g into both eyes At Bedtime 7 g 11     atorvastatin (LIPITOR) 10 MG tablet TAKE 1/2 TABLET BY MOUTH EVERY DAY 45 tablet 2     azithromycin (ZITHROMAX) 250 MG tablet Take 1 tablet (250 mg) by mouth daily 30 tablet 11     BD PEN NEEDLE JANE 2ND GEN 32G X 4 MM miscellaneous USE 4 DAILY AS DIRECTED. 400 each 1     blood glucose (NO BRAND SPECIFIED) lancets standard Use to test blood sugar 3 times daily or as directed 100 lancet 3     cevimeline (EVOXAC) 30 MG capsule Take 1 capsule (30 mg) by mouth 3 times daily 270 capsule 2     Cholecalciferol (VITAMIN D3) 50 MCG (2000 UT) CAPS Take 1 tablet (50 mcg) by mouth daily - Oral 90 capsule 2     COMPOUNDED NON-CONTROLLED SUBSTANCE (CMPD RX) - PHARMACY TO MIX COMPOUNDED MEDICATION Estriol 1 mg/g Apply small amount to finger and apply to inside vagina daily for 2 weeks then twice weekly Route: vaginally Dispense 30 grams 11 refills 30 g 11     cyanocobalamin (VITAMIN B-12) 1000 MCG tablet Take 1,000 mcg by mouth daily       cyanocobalamin (VITAMIN B-12) 1000 MCG tablet Take 1 tablet (1,000 mcg) by mouth daily 30 tablet 1     escitalopram (LEXAPRO) 20 MG tablet TAKE 1 TABLET BY MOUTH EVERY DAY 90 tablet 0     famotidine (PEPCID) 10 MG tablet Take 20 mg by mouth 2 times daily        ferrous sulfate (FEROSUL) 325 (65 Fe) MG tablet Take 1 tablet (325 mg) by mouth daily (with breakfast) 30 tablet 1     fluticasone (FLONASE) 50 MCG/ACT nasal spray Spray 1-2 sprays into both nostrils daily as needed for allergies 18.2 mL 11     fluticasone-vilanterol (BREO ELLIPTA) 100-25 MCG/INH inhaler Inhale 1 puff into the lungs daily 1 Inhaler 11     HYDROcodone-acetaminophen (NORCO) 7.5-325 MG per tablet Take 1 tablet by mouth every 12 hours OK to start and fill 02/07/22 60 tablet 0     hydroxychloroquine (PLAQUENIL) 200 MG tablet Take 2 tablets (400 mg) by  mouth daily Get annual eye exams for hydroxychloroquine (Plaquenil) monitoring and fax to [phone] 180 tablet 0     insulin aspart (NOVOLOG FLEXPEN) 100 UNIT/ML pen Inject 11 Units Subcutaneous 3 times daily (with meals) 15 mL 2     insulin glargine (BASAGLAR KWIKPEN) 100 UNIT/ML pen INJECT 22 UNITS SUBCUTANEOUS DAILY (TO REPLACE LANTUS) 15 mL 0     ketoconazole (NIZORAL) 2 % external cream Apply topically 2 times daily as needed for itching 60 g 1     KLOR-CON 20 MEQ CR tablet TAKE 2 TABLETS (40 MEQ) BY MOUTH EVERY MORNING AND 1 TABLET (20 MEQ) EVERY EVENING. 270 tablet 3     lidocaine (LIDODERM) 5 % patch APPLY PATCH TO PAINFUL AREA FOR UP TO 12 H WITHIN A 24 H PERIOD. REMOVE AFTER 12 HOURS. 30 patch 2     loratadine (CLARITIN) 10 MG tablet TAKE 1 TABLET BY MOUTH EVERY DAY 90 tablet 3     methocarbamol (ROBAXIN) 750 MG tablet Take 1 tablet (750 mg) by mouth 3 times daily as needed for muscle spasms 90 tablet 3     metoprolol succinate ER (TOPROL-XL) 50 MG 24 hr tablet Take 1 tablet (50 mg) by mouth 2 times daily 90 tablet 3     montelukast (SINGULAIR) 10 MG tablet TAKE 1 TABLET BY MOUTH EVERYDAY AT BEDTIME 90 tablet 0     predniSONE (DELTASONE) 5 MG tablet Take 1 tablet (5 mg) by mouth daily 30 tablet 0     rivaroxaban ANTICOAGULANT (XARELTO ANTICOAGULANT) 20 MG TABS tablet TAKE 1 TABLET BY MOUTH DAILY WITH DINNER 90 tablet 3     Semaglutide, 1 MG/DOSE, (OZEMPIC, 1 MG/DOSE,) 4 MG/3ML SOPN Inject 1 mg Subcutaneous once a week 9 mL 1     spironolactone (ALDACTONE) 25 MG tablet Take 2 tablets (50 mg) by mouth daily 180 tablet 3     torsemide (DEMADEX) 10 MG tablet TAKE ONE TAB (10 MG) WITH ONE 20 MG TAB TO = 30 MG DAILY IN AFTERNOON. 90 tablet 3     torsemide (DEMADEX) 20 MG tablet TAKE 2 TABLETS (40 MG) BY MOUTH EVERY MORNING AND 1 TABLET (20 MG) DAILY AT 2 PM. TAKE THE AFTERNOON DOSE WITH AN EXTRA 10 MG TAB FOR A TOTAL OF 30 MG IN THE AFTERNOON 270 tablet 3     traZODone (DESYREL) 50 MG tablet Take 0.5-1.5  tablets (25-75 mg) by mouth nightly as needed for sleep 135 tablet 1       ROS:  Review Of Systems  Skin:   Eyes: negative  Ears/Nose/Throat: negative  Respiratory: No shortness of breath, dyspnea on exertion, cough, or hemoptysis and Dyspnea on exertion-   Cardiovascular: negative  Gastrointestinal: negative  Genitourinary: negative  Musculoskeletal: negative  Neurologic: negative  Psychiatric: negative  Hematologic/Lymphatic/Immunologic: negative and anemia  Endocrine: negative      EXAM:  There were no vitals taken for this visit.  Home weight:  General: alert, articulate, and in no acute distress.  HEENT: normocephalic, atraumatic, anicteric sclera, EOMI, mucosa moist, no cyanosis.   Neck: neck supple.  No adenopathy, masses, or carotid bruits.  JVP not elevated at 90 degrees  Heart: regular rhythm, normal S1/S2, no murmur, gallop, rub.  Precordium quiet with normal PMI.     Lungs: clear, no rales, ronchi, or wheezing.  No accessory muscle use, respirations unlabored.   Abdomen: soft, non-tender, bowel sounds present, no hepatomegaly  Extremities:trace edema.   No cyanosis. pulses.   Neurological: alert and oriented x 3.  normal speech and affect, no gross motor deficits  Skin:  No ecchymoses, rashes, or clubbing.    Labs:  CBC RESULTS:  Lab Results   Component Value Date    WBC 9.0 03/04/2022    WBC 8.6 06/04/2021    RBC 3.73 (L) 03/04/2022    RBC 4.46 06/04/2021    HGB 9.6 (L) 03/04/2022    HGB 13.4 06/04/2021    HCT 33.6 (L) 03/04/2022    HCT 42.1 06/04/2021    MCV 90 03/04/2022    MCV 94 06/04/2021    MCH 25.7 (L) 03/04/2022    MCH 30.0 06/04/2021    MCHC 28.6 (L) 03/04/2022    MCHC 31.8 06/04/2021    RDW 19.9 (H) 03/04/2022    RDW 15.5 (H) 06/04/2021     03/04/2022     06/04/2021       CMP RESULTS:  Lab Results   Component Value Date     03/04/2022     06/04/2021    POTASSIUM 4.3 03/04/2022    POTASSIUM 4.0 06/04/2021    CHLORIDE 109 03/04/2022    CHLORIDE 106 06/04/2021    CO2 26  2022    CO2 25 2021    ANIONGAP 8 2022    ANIONGAP 6 2021     (H) 2022     (H) 2021    BUN 15 2022    BUN 14 2021    CR 0.99 2022    CR 1.11 (H) 2021    GFRESTIMATED 57 (L) 2022    GFRESTIMATED 47 (L) 2021    GFRESTBLACK 54 (L) 2021    CLAUDY 9.2 2022    CLAUDY 8.8 2021    BILITOTAL 0.6 2020    ALBUMIN 3.3 (L) 2022    ALBUMIN 3.5 2021    ALKPHOS 95 2020    ALT 17 2022    ALT 25 2021    AST 15 2022    AST 17 2021        INR RESULTS:  Lab Results   Component Value Date    INR 1.36 (H) 2020       No components found for: CK  Lab Results   Component Value Date    MAG 2.0 2017     Lab Results   Component Value Date    NTBNP 239 2022    NTBNP 176 2020     @BRIEFLABR (dig)@    Most recent echocardiogram:  No results found for this or any previous visit (from the past 8760 hour(s)).      Assessment and Plan:    In summary,Betty is a 81 year old here for a CORE appt. She appears to be euvolemic today. Her blood pressure has been a bit elevated. I have asked she check it at home and we will be in touch with her in 2 weeks. She doesn't want to add medications if she doesn't need to.    1.  Chronic diastolic heart failure .  Stage C  NYHA Class III  ACEi/ARB: no- will re-check BP and see if we need to add more medications.   BB: yes  Aldosterone antagonist: yes  SCD prophylaxis: does not meet criteria for implant  Fluid status: euvolemic  Anticoagulation:   Antiplatelet:  ASA dose   Sleep apnea:CPAP - wears it   NSAID use:  Contraindicated.  Avoid use.  Remote Monitorin.  Other comordbid conditions:     Hypertension: uncontrolled- will monitor at home     Atrial fibrillation [id number] (age >75, HTN, CHF, gender) - rates controlled on metoprolol , on a xarelto.  She is actually sinus right now.      Primary prevention: on statin, no ASA given she is  on anti-coagulation      3.  Follow-up   To be determined for CORE    Ruthy MEDEL, CNP  [facility]     I spent a total of 32 minutes on the date of service evaluating this patient which included face to face discussion, performing a physical exam, reviewing of the chart to gain information from other providers to obtain further history, ordering tests and/or medications, and documenting clinical information in the electronic health record.       Please do not hesitate to contact me if you have any questions/concerns.     Sincerely,     LIZY Owens CNP

## 2022-03-04 NOTE — PROGRESS NOTES
"  HPI: Betty is a 81 year old White female with a past medical history of hypertension, atrial fibrillation, Sjogren's syndrome and interstitial lung disease    The patient is here with her healthcare \"assitant\".  Her metoprolol was lowered by Dr. Moreno a few weeks ago ( due to bradycardia). She has some SOB at rest but not frequent. KIMBLE is present with any activity. Her PCP has been following her with the anemia. She was fine in December. She has no swelling in the LE. Her weight at home 187 lbs. Appetite has increased as well. She takes 40 mg am/30 mg in the afternoon of Torsemide.     Denies SOB, PND, orthopnea, edema, weight gain, chest pain, palpitations, lightheadedness, dizziness, near syncopal/syncopal episodes. Betty has been following salt and fluid restrictions.      PAST MEDICAL HISTORY:  Past Medical History:   Diagnosis Date     Alcohol abuse, in remission      Allergic rhinitis, cause unspecified     allegra helps when she takes it     Antiplatelet or antithrombotic long-term use      Atrial fibrillation (H)     in hosp in 11/11 after surgery w/ fluid overload     Cardiomegaly     LVH on stress echo- cardiac w/u at [facility] ER- neg CT scan for PE, neg stress echo in 8/06     Chest pain, unspecified      Congestive heart failure (H)      Depressive disorder      Diabetes (H)      Disorder of bone and cartilage, unspecified     osteopenia (had been on prempro), improved on 6/06 dexa, stable dexa 11/10     Diverticulosis of colon (without mention of hemorrhage)     last episode yrs ago     Essential hypertension, benign      Follicular bronchiolitis (H)     associated with Sjogrens, dx by chest CT showing mosaic attenuation and air trapping     Gastro-oesophageal reflux disease      ILD (interstitial lung disease) (H)     associated with Sjogrens, also has mildly elevated IgG4, first noted on chest CT 2015 (mild changes) and also has small airways disease; ILD improved on follow up chest CT 2018.     " Insomnia, unspecified     weaned off clonazepam     Irregular heart beat      Lumbago 7/09    MRI with [facility], now seeing Dr. Cain for sciatic sx's     Major depressive disorder, recurrent episode, moderate (H)      Obstructive sleep apnea     uses cpap     Osteoarthrosis, unspecified whether generalized or localized, unspecified site      Sjogren's syndrome (H)     + RG and SSA and lip bx     Sleep apnea      Tobacco use disorder     chantix in 9/07, started again in 6/08, working       FAMILY HISTORY:  Family History   Problem Relation Age of Onset     C.A.D. Mother 63        MI- first at age 63     Heart Disease Mother      Hypertension Mother      Cerebrovascular Disease Mother      Hyperlipidemia Mother      Coronary Artery Disease Mother         MI-first at age 63     Other - See Comments Mother         cerebrovascular disease      Alcohol/Drug Father      Alzheimer Disease Father      Dementia Father      Hypertension Father      Hyperlipidemia Father      Substance Abuse Father      Diabetes Sister      Hypertension Sister      Hyperlipidemia Sister      Substance Abuse Sister      Asthma Sister      C.A.D. Sister 52        Minor MI- age 50's     Heart Disease Sister      Hypertension Sister      Hypertension Sister      Hypertension Brother      Cancer - colorectal Sister 48        Late 40's early 50's     Prostate Cancer Brother 74        Dx'd age 74     Gastrointestinal Disease Sister         Diverticulitis     Gastrointestinal Disease Brother         Diverticulitis     Lipids Sister      Lipids Sister      Parkinsonism Brother      Diabetes Sister      Heart Disease Sister         CHF     Cancer Sister         lung, smoker     Substance Abuse Sister      Substance Abuse Brother      Asthma Sister      Cancer Sister      Breast Cancer Daughter      Prostate Cancer Brother      Hyperlipidemia Brother      Diabetes Other      Hypertension Other      Coronary Artery Disease Sister         minor MI-age 50's      Heart Disease Sister      Hypertension Sister      Hypertension Brother      Hypertension Sister      Colon Cancer Sister      Prostate Cancer Brother      Diverticulitis Sister      Diverticulitis Brother      Parkinsonism Brother      Lung Cancer Sister      Breast Cancer Daughter      Heart Disease Sister         CHF     Diabetes Sister      Asthma Sister        SOCIAL HISTORY:  Social History     Tobacco Use     Smoking status: Former Smoker     Packs/day: 0.50     Years: 10.00     Pack years: 5.00     Types: Cigarettes     Quit date: 8/1/2011     Years since quitting: 10.5     Smokeless tobacco: Never Used     Tobacco comment: 1/2 ppd   Substance Use Topics     Alcohol use: No     Alcohol/week: 0.0 standard drinks     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     Drug use: No           CURRENT MEDICATIONS:  Current Outpatient Medications   Medication Sig Dispense Refill     ACCU-CHEK SHANNON PLUS test strip USE TO TEST BLOOD SUGARS 3 TIMES DAILY 300 strip 5     acetaminophen (TYLENOL) 500 MG tablet Take 1,000 mg by mouth every 8 hours as needed (max 6 tablets/24 hours, 2 tablets/dose)        acyclovir (ZOVIRAX) 400 MG tablet Take 1 tablet (400 mg) by mouth 3 times daily For a couple days 15 tablet 2     acyclovir (ZOVIRAX) 5 % external ointment Apply topically 6 times daily As needed for outbreaks 15 g 3     albuterol (PROAIR HFA, PROVENTIL HFA, VENTOLIN HFA) 108 (90 BASE) MCG/ACT inhaler Inhale 2 puffs into the lungs every 6 hours 1 Inhaler 3     alendronate (FOSAMAX) 70 MG tablet Take 1 tablet (70 mg) by mouth every 7 days 12 tablet 0     anakinra (KINERET) 100 MG/0.67ML SOSY injection 100 mg every day x 3 days as needed for flare ups 6.7 mL 3     artificial tears OINT ophthalmic ointment Place 1 g into both eyes At Bedtime 7 g 11     atorvastatin (LIPITOR) 10 MG tablet TAKE 1/2 TABLET BY MOUTH EVERY DAY 45 tablet 2     azithromycin (ZITHROMAX) 250 MG tablet Take 1 tablet (250 mg) by mouth daily 30 tablet 11      BD PEN NEEDLE JANE 2ND GEN 32G X 4 MM miscellaneous USE 4 DAILY AS DIRECTED. 400 each 1     blood glucose (NO BRAND SPECIFIED) lancets standard Use to test blood sugar 3 times daily or as directed 100 lancet 3     cevimeline (EVOXAC) 30 MG capsule Take 1 capsule (30 mg) by mouth 3 times daily 270 capsule 2     Cholecalciferol (VITAMIN D3) 50 MCG (2000 UT) CAPS Take 1 tablet (50 mcg) by mouth daily - Oral 90 capsule 2     COMPOUNDED NON-CONTROLLED SUBSTANCE (CMPD RX) - PHARMACY TO MIX COMPOUNDED MEDICATION Estriol 1 mg/g Apply small amount to finger and apply to inside vagina daily for 2 weeks then twice weekly Route: vaginally Dispense 30 grams 11 refills 30 g 11     cyanocobalamin (VITAMIN B-12) 1000 MCG tablet Take 1,000 mcg by mouth daily       cyanocobalamin (VITAMIN B-12) 1000 MCG tablet Take 1 tablet (1,000 mcg) by mouth daily 30 tablet 1     escitalopram (LEXAPRO) 20 MG tablet TAKE 1 TABLET BY MOUTH EVERY DAY 90 tablet 0     famotidine (PEPCID) 10 MG tablet Take 20 mg by mouth 2 times daily        ferrous sulfate (FEROSUL) 325 (65 Fe) MG tablet Take 1 tablet (325 mg) by mouth daily (with breakfast) 30 tablet 1     fluticasone (FLONASE) 50 MCG/ACT nasal spray Spray 1-2 sprays into both nostrils daily as needed for allergies 18.2 mL 11     fluticasone-vilanterol (BREO ELLIPTA) 100-25 MCG/INH inhaler Inhale 1 puff into the lungs daily 1 Inhaler 11     HYDROcodone-acetaminophen (NORCO) 7.5-325 MG per tablet Take 1 tablet by mouth every 12 hours OK to start and fill 02/07/22 60 tablet 0     hydroxychloroquine (PLAQUENIL) 200 MG tablet Take 2 tablets (400 mg) by mouth daily Get annual eye exams for hydroxychloroquine (Plaquenil) monitoring and fax to [phone] 180 tablet 0     insulin aspart (NOVOLOG FLEXPEN) 100 UNIT/ML pen Inject 11 Units Subcutaneous 3 times daily (with meals) 15 mL 2     insulin glargine (BASAGLAR KWIKPEN) 100 UNIT/ML pen INJECT 22 UNITS SUBCUTANEOUS DAILY (TO REPLACE LANTUS) 15 mL 0      ketoconazole (NIZORAL) 2 % external cream Apply topically 2 times daily as needed for itching 60 g 1     KLOR-CON 20 MEQ CR tablet TAKE 2 TABLETS (40 MEQ) BY MOUTH EVERY MORNING AND 1 TABLET (20 MEQ) EVERY EVENING. 270 tablet 3     lidocaine (LIDODERM) 5 % patch APPLY PATCH TO PAINFUL AREA FOR UP TO 12 H WITHIN A 24 H PERIOD. REMOVE AFTER 12 HOURS. 30 patch 2     loratadine (CLARITIN) 10 MG tablet TAKE 1 TABLET BY MOUTH EVERY DAY 90 tablet 3     methocarbamol (ROBAXIN) 750 MG tablet Take 1 tablet (750 mg) by mouth 3 times daily as needed for muscle spasms 90 tablet 3     metoprolol succinate ER (TOPROL-XL) 50 MG 24 hr tablet Take 1 tablet (50 mg) by mouth 2 times daily 90 tablet 3     montelukast (SINGULAIR) 10 MG tablet TAKE 1 TABLET BY MOUTH EVERYDAY AT BEDTIME 90 tablet 0     predniSONE (DELTASONE) 5 MG tablet Take 1 tablet (5 mg) by mouth daily 30 tablet 0     rivaroxaban ANTICOAGULANT (XARELTO ANTICOAGULANT) 20 MG TABS tablet TAKE 1 TABLET BY MOUTH DAILY WITH DINNER 90 tablet 3     Semaglutide, 1 MG/DOSE, (OZEMPIC, 1 MG/DOSE,) 4 MG/3ML SOPN Inject 1 mg Subcutaneous once a week 9 mL 1     spironolactone (ALDACTONE) 25 MG tablet Take 2 tablets (50 mg) by mouth daily 180 tablet 3     torsemide (DEMADEX) 10 MG tablet TAKE ONE TAB (10 MG) WITH ONE 20 MG TAB TO = 30 MG DAILY IN AFTERNOON. 90 tablet 3     torsemide (DEMADEX) 20 MG tablet TAKE 2 TABLETS (40 MG) BY MOUTH EVERY MORNING AND 1 TABLET (20 MG) DAILY AT 2 PM. TAKE THE AFTERNOON DOSE WITH AN EXTRA 10 MG TAB FOR A TOTAL OF 30 MG IN THE AFTERNOON 270 tablet 3     traZODone (DESYREL) 50 MG tablet Take 0.5-1.5 tablets (25-75 mg) by mouth nightly as needed for sleep 135 tablet 1       ROS:  Review Of Systems  Skin:   Eyes: negative  Ears/Nose/Throat: negative  Respiratory: No shortness of breath, dyspnea on exertion, cough, or hemoptysis and Dyspnea on exertion-   Cardiovascular: negative  Gastrointestinal: negative  Genitourinary: negative  Musculoskeletal:  negative  Neurologic: negative  Psychiatric: negative  Hematologic/Lymphatic/Immunologic: negative and anemia  Endocrine: negative      EXAM:  There were no vitals taken for this visit.  Home weight:  General: alert, articulate, and in no acute distress.  HEENT: normocephalic, atraumatic, anicteric sclera, EOMI, mucosa moist, no cyanosis.   Neck: neck supple.  No adenopathy, masses, or carotid bruits.  JVP not elevated at 90 degrees  Heart: regular rhythm, normal S1/S2, no murmur, gallop, rub.  Precordium quiet with normal PMI.     Lungs: clear, no rales, ronchi, or wheezing.  No accessory muscle use, respirations unlabored.   Abdomen: soft, non-tender, bowel sounds present, no hepatomegaly  Extremities:trace edema.   No cyanosis. pulses.   Neurological: alert and oriented x 3.  normal speech and affect, no gross motor deficits  Skin:  No ecchymoses, rashes, or clubbing.    Labs:  CBC RESULTS:  Lab Results   Component Value Date    WBC 9.0 03/04/2022    WBC 8.6 06/04/2021    RBC 3.73 (L) 03/04/2022    RBC 4.46 06/04/2021    HGB 9.6 (L) 03/04/2022    HGB 13.4 06/04/2021    HCT 33.6 (L) 03/04/2022    HCT 42.1 06/04/2021    MCV 90 03/04/2022    MCV 94 06/04/2021    MCH 25.7 (L) 03/04/2022    MCH 30.0 06/04/2021    MCHC 28.6 (L) 03/04/2022    MCHC 31.8 06/04/2021    RDW 19.9 (H) 03/04/2022    RDW 15.5 (H) 06/04/2021     03/04/2022     06/04/2021       CMP RESULTS:  Lab Results   Component Value Date     03/04/2022     06/04/2021    POTASSIUM 4.3 03/04/2022    POTASSIUM 4.0 06/04/2021    CHLORIDE 109 03/04/2022    CHLORIDE 106 06/04/2021    CO2 26 03/04/2022    CO2 25 06/04/2021    ANIONGAP 8 03/04/2022    ANIONGAP 6 06/04/2021     (H) 03/04/2022     (H) 06/04/2021    BUN 15 03/04/2022    BUN 14 06/04/2021    CR 0.99 03/04/2022    CR 1.11 (H) 06/04/2021    GFRESTIMATED 57 (L) 03/04/2022    GFRESTIMATED 47 (L) 06/04/2021    GFRESTBLACK 54 (L) 06/04/2021    CLAUDY 9.2 03/04/2022    CLAUDY  8.8 2021    BILITOTAL 0.6 2020    ALBUMIN 3.3 (L) 2022    ALBUMIN 3.5 2021    ALKPHOS 95 2020    ALT 17 2022    ALT 25 2021    AST 15 2022    AST 17 2021        INR RESULTS:  Lab Results   Component Value Date    INR 1.36 (H) 2020       No components found for: CK  Lab Results   Component Value Date    MAG 2.0 2017     Lab Results   Component Value Date    NTBNP 239 2022    NTBNP 176 2020     @BRIEFLABR (dig)@    Most recent echocardiogram:  No results found for this or any previous visit (from the past 8760 hour(s)).      Assessment and Plan:    In summary,Betty is a 81 year old here for a CORE appt. She appears to be euvolemic today. Her blood pressure has been a bit elevated. I have asked she check it at home and we will be in touch with her in 2 weeks. She doesn't want to add medications if she doesn't need to.    1.  Chronic diastolic heart failure .  Stage C  NYHA Class III  ACEi/ARB: no- will re-check BP and see if we need to add more medications.   BB: yes  Aldosterone antagonist: yes  SCD prophylaxis: does not meet criteria for implant  Fluid status: euvolemic  Anticoagulation:   Antiplatelet:  ASA dose   Sleep apnea:CPAP - wears it   NSAID use:  Contraindicated.  Avoid use.  Remote Monitorin.  Other comordbid conditions:     Hypertension: uncontrolled- will monitor at home     Atrial fibrillation [id number] (age >75, HTN, CHF, gender) - rates controlled on metoprolol , on a xarelto.  She is actually sinus right now.      Primary prevention: on statin, no ASA given she is on anti-coagulation      3.  Follow-up   To be determined for CORE    Ruthy MEDEL CNP  [facility]     I spent a total of 32 minutes on the date of service evaluating this patient which included face to face discussion, performing a physical exam, reviewing of the chart to gain information from other providers to obtain further history, ordering  tests and/or medications, and documenting clinical information in the electronic health record.

## 2022-03-04 NOTE — TELEPHONE ENCOUNTER
CW,   Please confirm stool order  Patient called and spoke with Jocelyn - see below  Wants to do the FIT as usual for colon cancer screening  Please advise  Thanks,  Lydia HALEY RN

## 2022-03-04 NOTE — NURSING NOTE
Return Appointment: Patient given instructions regarding scheduling next clinic visit. Patient demonstrated understanding of this information and agreed to call with further questions or concerns.    Patient stated she understood all health information given and agreed to call with further questions or concerns.    Yanique Mckeon RN

## 2022-03-04 NOTE — TELEPHONE ENCOUNTER
Medication refill information reviewed.     Due date for  HYDROcodone-acetaminophen (NORCO) 7.5-325 MG per tablet is 03/09/22     Prescriptions prepped for review.     Will route to provider.

## 2022-03-05 NOTE — TELEPHONE ENCOUNTER
reviewed, norco refsharmila approved.    Charly Moore [facility]

## 2022-03-07 RX ORDER — PANTOPRAZOLE SODIUM 40 MG/1
40 TABLET, DELAYED RELEASE ORAL DAILY
Qty: 90 TABLET | Refills: 1 | Status: SHIPPED | OUTPATIENT
Start: 2022-03-07 | End: 2022-08-01

## 2022-03-07 NOTE — RESULT ENCOUNTER NOTE
Called pt and discussed labs and follow-up issues....    Reviewed labs- hgb is a bit better- up from 9.1 to 9.6, and ferritin is up from 12 to 36 with just a few weeks on the iron.  Unfortunately, the lab at the [facility] did not do the peripheral smear lab that was ordered at the same time, and did not want to have pt do the fecal occult blood test kits (rec FIT test instead, though that is not supposed to be done for diagnostic concerns, just screening).    Reviewed chart before and during discussion with pt...  Saw that last FIT test was positive in 3/20, during hospital stay with hematochezia.  Not sent to this provider, no f/u hospital stay appt, and multiple other issues discussed at next visit several months later with this provider.      Also reviewed GERD/ulcer meds- she had been on pantoprazole for yrs.  Unsure when it was stopped, or by who-- possibly in ~2018?  She has been on pepcid 20mg twice daily since at last year, hard to see from chart who/when it was started.  Pt checked her med lists, and it is written down that she started on pepcid on 8/16/21, once daily, twice daily from 12/5/21.  Reviewed chart- no visits on those dates, no discussion of the medication around those months.      Stools have been fine except yesterday when she explosive diarrhea.  Usually normal, formed, brown stools (until she started iron when her stools turned dark).  She denies heartburn sx's.  No other abd sx's.  The diarrhea was very unusual for her, ended up lying down and taking a nap for a few hrs, which is also unusual for her. Thinks she may have had a virus or something.  She is still generally fatigued, worn out, but no other sx's.      Discussed options and plan -   --Cont iron and B12 supplementation with the improving hgb and ferritin.  --Will restart pantoprazole 40mg/d (and stop the pepcid).  --Discussed GI consultation with her h/o GI abscess, GI bleed, s/p sigmoid resection, and now sudden anemia, but she would  like to hold off on consultation.  She still is unsure if she would want to treat a colon cancer if it was diagnosed, and is okay waiting to see if other interventions help.  She is aware the dx is a possibility.  --I will discuss GI testing with lab, and see about having her get the fecal occult blood tests to test for blood- pt prefers this as a first step.    Will copy this note into the phone encounter, and f/up from there...CW

## 2022-03-07 NOTE — TELEPHONE ENCOUNTER
EnvysionlaurynRezzcard message sent with Rx approval from provider.  Belén  [facility] Management Team

## 2022-03-07 NOTE — TELEPHONE ENCOUNTER
Reviewed labs- hgb is a bit better- up from 9.1 to 9.6, and ferritin is up from 12 to 36 with just a few weeks on the iron.  [place] [facility] [place] smear lab that was ordered at the same time, and did not want to have pt do the fecal occult blood test kits (rec FIT test instead, though that is not supposed to be done for diagnostic concerns, just screening).    Started to review chart...  Saw that last FIT test was positive in 3/20, during hospital stay with hematochezia.  Not sent to this provider, no f/u hospital stay appt, and multiple other issues discussed at next visit several months later with this provider.      Also reviewed GERD/ulcer meds- she had been on pantoprazole for yrs.  Unsure when it was stopped, or by who.  She has been on pepcid 20mg twice daily since at last year, hard to see from chart who/when it was started.   Pt checked her med lists, and she has that she started on pepcid on 8/16/21, once daily, twice daily from 12/5/21.  Reviewed chart- no visits on those dates, no discussion of the medication around those months.      Stools have been fine except yesterday when she explosive diarrhea.  Usually normal, formed, brown stools (until she started iron when her stools turned dark).  She denies heartburn sx's.     Discussed options and plan with pt -   --Cont iron and B12 supplementation with the improving hgb and ferritin.  --Will restart pantoprazole 40mg/d (and stop the pepcid)- rx sent.  --Discussed GI consultation with her h/o GI abscess, GI bleed, s/p sigmoid resection, and now sudden anemia, but she would like to hold off on consultation.  She still is unsure if she would want to treat a colon cancer if it was diagnosed, and is okay waiting to see if other interventions help.  She is aware the dx is a possibility.    --**I will discuss GI testing with lab, and see about having her get the fecal occult blood tests to test for blood- pt prefers this as  a first step.

## 2022-03-09 ENCOUNTER — RADIOLOGY INJECTION OFFICE VISIT (OUTPATIENT)
Dept: PALLIATIVE MEDICINE | Facility: CLINIC | Age: 82
End: 2022-03-09
Attending: PHYSICAL MEDICINE & REHABILITATION
Payer: MEDICARE

## 2022-03-09 VITALS — HEART RATE: 63 BPM | OXYGEN SATURATION: 97 % | SYSTOLIC BLOOD PRESSURE: 135 MMHG | DIASTOLIC BLOOD PRESSURE: 50 MMHG

## 2022-03-09 DIAGNOSIS — M25.551 CHRONIC PAIN OF RIGHT HIP: ICD-10-CM

## 2022-03-09 DIAGNOSIS — M16.11 OSTEOARTHRITIS OF RIGHT HIP, UNSPECIFIED OSTEOARTHRITIS TYPE: Primary | ICD-10-CM

## 2022-03-09 DIAGNOSIS — G89.29 CHRONIC PAIN OF RIGHT HIP: ICD-10-CM

## 2022-03-09 PROCEDURE — 77002 NEEDLE LOCALIZATION BY XRAY: CPT | Performed by: PHYSICAL MEDICINE & REHABILITATION

## 2022-03-09 PROCEDURE — 20610 DRAIN/INJ JOINT/BURSA W/O US: CPT | Mod: RT | Performed by: PHYSICAL MEDICINE & REHABILITATION

## 2022-03-09 RX ORDER — TRIAMCINOLONE ACETONIDE 40 MG/ML
40 INJECTION, SUSPENSION INTRA-ARTICULAR; INTRAMUSCULAR ONCE
Status: COMPLETED | OUTPATIENT
Start: 2022-03-09 | End: 2022-03-09

## 2022-03-09 RX ADMIN — TRIAMCINOLONE ACETONIDE 40 MG: 40 INJECTION, SUSPENSION INTRA-ARTICULAR; INTRAMUSCULAR at 16:22

## 2022-03-09 NOTE — NURSING NOTE
Pre-procedure Intake  If YES to any questions or NO to having a   Please complete laminated checklist and leave on the computer keyboard for Provider, verbally inform provider if able.    For SCS Trial, RFA's or any sedation procedure:  Have you been fasting? NA    If yes, for how long?     Are you taking any any blood thinners such as Coumadin, Warfarin, Jantoven, Pradaxa Xarelto, Eliquis, Edoxaban, Enoxaparin, Lovenox, Heparin, Arixtra, Fondaparinux, or Fragmin? OR Antiplatelet medication such as Plavix, Brilinta, or Effient?   Yes -   Other blood thinners Xarelto    If yes, when did you take your last dose? Last night    Do you take aspirin?  No    If cervical procedure, have you held aspirin for 6 days?   NA    Do you have any allergies to contrast dye, iodine, steroid and/or numbing medications?  NO    Are you currently taking antibiotics or have an active infection?  YES: Zythromyacin, 250mg, for chronic condition    Have you had a fever/elevated temperature within the past week? NO    Are you currently taking oral steroids? YES: Prednisone, 5m, for chronic condition    Do you have a ? Yes    Are you pregnant or breastfeeding?  Not Applicable    Have you received the COVID-19 vaccine? No     If yes, was it your 1st, 2nd or only dose needed?     Date of most recent vaccine:     Notify provider and RNs if systolic BP >170, diastolic BP >100, P >100 or O2 sats < 90%

## 2022-03-09 NOTE — PATIENT INSTRUCTIONS
[facility] Procedure Discharge Instructions    Today you saw: Dr. Charly Moore    Your procedure: Hip injection       Medications used:  Lidocaine (anesthetic)  Bupivacaine (anesthetic)    Kenalog (steroid)  Omnipaque (contrast)               Be cautious when walking as numbness and/or weakness in the legs may occur up to 6-8 hours after the procedure due to effect of the local anesthetic    Do not drive for 6 hours. The effect of the local anesthetic could slow your reflexes.     Avoid strenuous activity for the first 24 hours. You may resume your regular activities after that.     You may shower, however avoid swimming, tub baths or hot tubs for 24 hours following your procedure    You may have a mild to moderate increase in pain for several days following the injection.      You may use ice packs for 10-15 minutes, 3 to 4 times a day at the injection site for comfort    Do not use heat to painful areas for 6 to 8 hours. This will give the local anesthetic time to wear off and prevent you from accidentally burning your skin.    Unless you have been directed to avoid the use of anti-inflammatory medications (NSAIDS-ibuprofen, Aleve, Motrin), you may use these medications or Tylenol for pain control if needed.     With diabetes, check your blood sugar more frequently than usual as your blood sugar may be higher than normal for 10-14 days following a steroid injection. Contact your doctor who manages your diabetes if your blood sugar is higher than usual    Possible side effects of steroids that you may experience include flushing, elevated blood pressure, increased appetite, mild headaches and restlessness.  All of these symptoms will get better with time.    It may take up to 14 days for the steroid medication to start working although you may feel the effect as early as a few days after the procedure.     Follow up with Dr Moore in 4-6 weeks      If you experience any of the following,  call the pain center line during work hours at [phone] or on-call physician after hours at [phone]:  -Fever over 100 degree F  -Swelling, bleeding, redness, drainage, warmth at the injection site  -Progressive weakness or numbness in your leg  -Unusual new onset of pain that is not improving

## 2022-03-09 NOTE — NURSING NOTE
Discharge Information    IV Discontiued Time:  NA    Amount of Fluid Infused:  NA    Discharge Criteria = When patient returns to baseline or as per MD order    Consciousness:  Pt is fully awake    Circulation:  BP +/- 20% of pre-procedure level    Respiration:  Patient is able to breathe deeply    O2 Sat:  Patient is able to maintain O2 Sat >92% on room air    Activity:  Moves 4 extremities on command    Ambulation:  Patient is able to stand and walk or stand and pivot into wheelchair    Dressing:  Clean/dry or No Dressing    Notes:   Discharge instructions and AVS given to patient    Patient meets criteria for discharge?  YES    Admitted to PCU?  No    Responsible adult present to accompany patient home?  Yes    Signature/Title:    Ilene Nelson RN  RN Care Coordinator  [facility]

## 2022-03-09 NOTE — TELEPHONE ENCOUNTER
Left message for patient to call [facility] back  When patient calls back please transfer to an RN  Lydia HALEY RN

## 2022-03-09 NOTE — TELEPHONE ENCOUNTER
RECORDS RECEIVED FROM: osteoarthritis of R hip/Dr. Lopez/XR/BC[facility]/ortho con   DATE RECEIVED: May 5, 2022     NOTES STATUS DETAILS   OFFICE NOTE from referring provider Internal Zulma Lopez MD   OFFICE NOTE from other specialist Internal  Charly Moore MD     MEDICATION LIST Internal    INJECTIONS DONE IN RADIOLOGY Internal 6/23/21  MORE INTERNAL   MRI Internal 9/5/20   XRAYS (IMAGES & REPORTS) Internal 12/9/21

## 2022-03-09 NOTE — PROGRESS NOTES
[facility] - Procedure Note    Date of Service: 3/9/2022    Pre procedure Diagnosis: Right Hip osteoarthritis  Post procedure Diagnosis: Same  Procedure performed: Right intraarticular hip injection  Anesthesia: none  Operators: Charly Moore DO     Indications:   Betty Tee is a 81 year old female who is seen for a right hip joint injection.  They have a history of hip pain and arthropathy. The patient describes chronic right sided hip and buttock pain. They have tried conservative treatment including medications and PT.    Imaging of the hip was done on 12/9/21 showing:  HISTORY: R hip pain, xray requested per chiropractor; Hip pain, right     COMPARISON: MRI 9/5/2020, radiographs 3/4/2020     FINDINGS: AP and frog-leg lateral right hip views were obtained.      No acute osseous abnormality. Severe degenerative changes of the right  hip, progressed compared to prior. Vascular ossifications. Lumbar  spondylosis. Right SI joint degenerative change.                                                                      IMPRESSION: Severe right hip degenerative change, progressed compared  to 3/4/2020.     ISIDRO ABRAHAM MD (Joe)     Allergies:      Allergies   Allergen Reactions     Amoxicillin-Pot Clavulanate      Augmentin Nausea and Vomiting     Codeine Nausea and Vomiting     Codeine      PN: LW Reaction: HIVES     Penicillins Nausea and Vomiting     PN: LW Reaction: GI Upset     Phenobarbital Itching     Phenobarbital      Seasonal Allergies         Vitals:  /56   Pulse 61   SpO2 98%     Medications were reviewed.    Procedure:  Options/alternatives, benefits and risks were discussed with the patient. Risks include but are not limited to: infection, bleeding, increased pain, and damage to soft tissue, nerve, muscle, and vasculature structures. After getting informed consent, patient was brought into the procedure suite and was placed in a left lateral  decubitus position on the procedure table.   A Pause for the Cause was performed.  Patient was prepped and draped in sterile fashion.   The hip joint was identified using AP fluoroscopy.  A 25 gauge 3.5 inch spinal needle was advanced under intermittent fluoroscopy until it was felt to enter the hip joint.   A total of 1 mL of Omnipaque-300 was injected, showing appropriate location, with spread into the intraarticular space and no intravascular uptake noted. 9 mL of contrast was wasted. Lateral fluoroscopy (patient's true AP) revealed contrast outlining the hip capsule.  A mixture containing, 3 mL of 0.5% bupivacaine and  40 mg of triamcinolone was injected. The needle was removed. Hemostasis was achieved, the area was cleaned, and bandaids were placed when appropriate. Images were saved to PACS.  The patient tolerated the procedure well, and was taken to the recovery room, and there was no evidence of procedural complications. No new sensory or motor deficits were noted following the procedure. The patient was stable and able to ambulate on discharge home. Post-procedure instructions were provided.     Pre-procedure pain score: 6/10  Post-procedure pain score: 6/10    Assessment/Plan: Betty Tee is a 81 year old female s/p right hip joint injection today for hip pain and arthropathy.     1. Following today's procedure, the patient was advised to contact the [facility] for any of the following:   Fever, chills, or night sweats   New onset of pain, numbness, or weakness   Any questions/concerns regarding the procedure  If unable to contact the Pain Center, the patient was instructed to go to a local Emergency Room for any complications.   2. The patient should follow-up with the referring provider in 2-4 weeks for post-procedure evaluation.        Charly Moore DO  [facility] [place]

## 2022-03-09 NOTE — TELEPHONE ENCOUNTER
Spoke with [facility] lab staff, and they have the occult blood tests kits and can give them to pt or someone who picks them up for her (and agree this is the better test for this situation).  Future orders are already placed.  Triage- can you let her know that she can send someone to  the stool kits?  Thanks!  CW

## 2022-03-11 ENCOUNTER — LAB (OUTPATIENT)
Dept: LAB | Facility: CLINIC | Age: 82
End: 2022-03-11
Payer: MEDICARE

## 2022-03-11 ENCOUNTER — TELEPHONE (OUTPATIENT)
Dept: CARDIOLOGY | Facility: CLINIC | Age: 82
End: 2022-03-11

## 2022-03-11 PROCEDURE — 82274 ASSAY TEST FOR BLOOD FECAL: CPT | Performed by: FAMILY MEDICINE

## 2022-03-13 LAB — HEMOCCULT STL QL IA: POSITIVE

## 2022-03-14 ENCOUNTER — TELEPHONE (OUTPATIENT)
Dept: FAMILY MEDICINE | Facility: CLINIC | Age: 82
End: 2022-03-14
Payer: MEDICARE

## 2022-03-14 ENCOUNTER — TELEPHONE (OUTPATIENT)
Dept: CARDIOLOGY | Facility: CLINIC | Age: 82
End: 2022-03-14
Payer: MEDICARE

## 2022-03-14 DIAGNOSIS — Z90.49 HISTORY OF PARTIAL SURGICAL REMOVAL OF COLON: ICD-10-CM

## 2022-03-14 DIAGNOSIS — R19.5 FECAL OCCULT BLOOD TEST POSITIVE: ICD-10-CM

## 2022-03-14 DIAGNOSIS — D64.9 ANEMIA, UNSPECIFIED TYPE: Primary | ICD-10-CM

## 2022-03-14 NOTE — TELEPHONE ENCOUNTER
CW,    Pt calling requesting next step on positive FIT test.    She also was calling says she is returning a call to someone from Friday.  I don't see any messages other then cardiology 3/11. Did inform her and encourage her to call them back.  She says someone from our office called on Friday.  I don't see anything - do you?    Please advise.  Thanks,  Radha Otero RN

## 2022-03-14 NOTE — TELEPHONE ENCOUNTER
Called Sister Sita to check in and see how her BP has been at home. BP was 140/60 this morning, and it is around that range every day. She did not have her log of BP in front of her at this time. Will review with provider and let Sister Sita know any recommendations.    Yanique Mckeon RN

## 2022-03-15 NOTE — TELEPHONE ENCOUNTER
I can't recall if I called her on Friday, but I did try today, did not leave a msg.  Will try back later.  Occult blood fecal test (first one) was positive, so she doesn't need to do the other tests.  Want to discuss with her likely colorectal consultation referral- think with her history best to start there, and if they want to do an endoscopy, we can get her to GI for that option.  She had worked with Dr. Sorto in the remote past, and they had decided against further colonoscopies, but pt may be willing to discuss that option again now with her other issues a bit more stable, and this new anemia.  CW

## 2022-03-16 ENCOUNTER — TELEPHONE (OUTPATIENT)
Dept: CARDIOLOGY | Facility: CLINIC | Age: 82
End: 2022-03-16
Payer: MEDICARE

## 2022-03-16 NOTE — TELEPHONE ENCOUNTER
Called Sister Sita to discuss blood pressure medications. Left number for call back.    Yanique Mckeon RN

## 2022-03-18 ENCOUNTER — TELEPHONE (OUTPATIENT)
Dept: CARDIOLOGY | Facility: CLINIC | Age: 82
End: 2022-03-18
Payer: MEDICARE

## 2022-03-18 NOTE — TELEPHONE ENCOUNTER
Call to Sister Sita. She does not remember being on amlodipine in the past. BP this week has been 120s/60s. I asked her to continue monitoring her BP at home and to let us know if SBP is consistantly greater than 130. She expressed understanding.    Yanique Mckeon RN

## 2022-03-18 NOTE — TELEPHONE ENCOUNTER
Called and reached Sister Betty today and discussed issues...  The good news is that she is feeling better- more energy (possibly from the iron/B12 supplementation).  Discussed the positive occult blood test result.  Given this, would like her to see colorectal to review her case and recommend options for further work-up.  Pt had worked with Dr. Sorto in the past (who has since moved), but she recalls Dr. Canseco was involved in her care as well- would like to go back to see him if possible.  Referral placed, pt will call to schedule appt.  CW

## 2022-03-21 ENCOUNTER — TELEPHONE (OUTPATIENT)
Dept: SURGERY | Facility: CLINIC | Age: 82
End: 2022-03-21
Payer: MEDICARE

## 2022-03-21 NOTE — TELEPHONE ENCOUNTER
Health Call Center    Phone Message    May a detailed message be left on voicemail: yes     Reason for Call: Other: Pt's care worker, Nghia, is calling in asking for a call back. She states that the patient has been referred to the colon-rectal surgery clinic for anemia and blood in stool. She states that the patient previously had a surgery with Dr. Santana for a colon abscess, and her PCP has recommended following up with Dr. Santana. HP TE being sent as per guidelines for colon abscess for assessment. Please call Nghia back as soon as possible to discuss.    Action Taken: Message routed to:  [facility] Colon/Rectal    Travel Screening: Not Applicable

## 2022-03-21 NOTE — TELEPHONE ENCOUNTER
Spoke with Ngiha who states that sister Betty has been having new rectal bleeding and anemia. She prefers to see Dr. Santana for this. Set up visit on April 19th and told Nghia that if Betty would need any surgery she may need to see someone else as Dr. Santana is retiring the end of May. She is comfortable with this. Set up visit with Dr. Santana.

## 2022-03-23 ENCOUNTER — OFFICE VISIT (OUTPATIENT)
Dept: FAMILY MEDICINE | Facility: CLINIC | Age: 82
End: 2022-03-23
Payer: MEDICARE

## 2022-03-23 VITALS
OXYGEN SATURATION: 99 % | WEIGHT: 192.1 LBS | HEART RATE: 65 BPM | DIASTOLIC BLOOD PRESSURE: 57 MMHG | BODY MASS INDEX: 30.76 KG/M2 | SYSTOLIC BLOOD PRESSURE: 137 MMHG | TEMPERATURE: 97.1 F

## 2022-03-23 DIAGNOSIS — K57.32 DIVERTICULITIS OF COLON: ICD-10-CM

## 2022-03-23 DIAGNOSIS — Z87.19 HISTORY OF LOWER GI BLEEDING: ICD-10-CM

## 2022-03-23 DIAGNOSIS — E11.65 TYPE 2 DIABETES MELLITUS WITH HYPERGLYCEMIA, WITH LONG-TERM CURRENT USE OF INSULIN (H): ICD-10-CM

## 2022-03-23 DIAGNOSIS — K21.9 GASTROESOPHAGEAL REFLUX DISEASE WITHOUT ESOPHAGITIS: ICD-10-CM

## 2022-03-23 DIAGNOSIS — Z79.4 TYPE 2 DIABETES MELLITUS WITH HYPERGLYCEMIA, WITH LONG-TERM CURRENT USE OF INSULIN (H): ICD-10-CM

## 2022-03-23 DIAGNOSIS — M19.90 INFLAMMATORY ARTHRITIS: ICD-10-CM

## 2022-03-23 DIAGNOSIS — D50.9 IRON DEFICIENCY ANEMIA, UNSPECIFIED IRON DEFICIENCY ANEMIA TYPE: Primary | ICD-10-CM

## 2022-03-23 DIAGNOSIS — J30.1 SEASONAL ALLERGIC RHINITIS DUE TO POLLEN: ICD-10-CM

## 2022-03-23 DIAGNOSIS — M16.11 OSTEOARTHRITIS OF RIGHT HIP, UNSPECIFIED OSTEOARTHRITIS TYPE: ICD-10-CM

## 2022-03-23 LAB
BASOPHILS # BLD AUTO: 0.1 10E3/UL (ref 0–0.2)
BASOPHILS NFR BLD AUTO: 1 %
EOSINOPHIL # BLD AUTO: 0.2 10E3/UL (ref 0–0.7)
EOSINOPHIL NFR BLD AUTO: 2 %
ERYTHROCYTE [DISTWIDTH] IN BLOOD BY AUTOMATED COUNT: 23 % (ref 10–15)
HCT VFR BLD AUTO: 36.4 % (ref 35–47)
HGB BLD-MCNC: 11.3 G/DL (ref 11.7–15.7)
LYMPHOCYTES # BLD AUTO: 1.1 10E3/UL (ref 0.8–5.3)
LYMPHOCYTES NFR BLD AUTO: 10 %
MCH RBC QN AUTO: 28.1 PG (ref 26.5–33)
MCHC RBC AUTO-ENTMCNC: 31 G/DL (ref 31.5–36.5)
MCV RBC AUTO: 91 FL (ref 78–100)
MONOCYTES # BLD AUTO: 1 10E3/UL (ref 0–1.3)
MONOCYTES NFR BLD AUTO: 10 %
NEUTROPHILS # BLD AUTO: 7.9 10E3/UL (ref 1.6–8.3)
NEUTROPHILS NFR BLD AUTO: 78 %
PLATELET # BLD AUTO: 229 10E3/UL (ref 150–450)
RBC # BLD AUTO: 4.02 10E6/UL (ref 3.8–5.2)
RETICS # AUTO: 0.15 10E6/UL (ref 0.03–0.1)
RETICS/RBC NFR AUTO: 3.5 % (ref 0.5–2)
WBC # BLD AUTO: 10.2 10E3/UL (ref 4–11)

## 2022-03-23 PROCEDURE — 82728 ASSAY OF FERRITIN: CPT | Performed by: FAMILY MEDICINE

## 2022-03-23 PROCEDURE — 85025 COMPLETE CBC W/AUTO DIFF WBC: CPT | Performed by: FAMILY MEDICINE

## 2022-03-23 PROCEDURE — 36415 COLL VENOUS BLD VENIPUNCTURE: CPT | Performed by: FAMILY MEDICINE

## 2022-03-23 PROCEDURE — 85045 AUTOMATED RETICULOCYTE COUNT: CPT | Performed by: FAMILY MEDICINE

## 2022-03-23 PROCEDURE — 99214 OFFICE O/P EST MOD 30 MIN: CPT | Performed by: FAMILY MEDICINE

## 2022-03-23 PROCEDURE — 85060 BLOOD SMEAR INTERPRETATION: CPT | Performed by: PATHOLOGY

## 2022-03-23 RX ORDER — INSULIN GLARGINE 100 [IU]/ML
INJECTION, SOLUTION SUBCUTANEOUS
Qty: 15 ML | Refills: 0 | Status: SHIPPED | OUTPATIENT
Start: 2022-03-23 | End: 2022-06-09

## 2022-03-23 NOTE — TELEPHONE ENCOUNTER
Diagnosis, Referred by & from: Blood in stool/anemia   Appt date: 4/19/2022   NOTES STATUS DETAILS   OFFICE NOTE from referring provider N/A    [facility] specialist [facility]  3[facility]23/22, 2/23/22 - PCC OV with Dr. Tristan    [facility]:  10/2/19 - URO OV with Dr. Nassar  12/23/16 - CR OV with Dr. Siddiqi   DISCHARGE SUMMARY from hospital N/A    DISCHARGE REPORT from the ER N/A    OPERATIVE REPORT [facility]:  2/1/12 - OP Note for TAKEDOWN LOOP ILEOSTOMY with Dr. Siddiqi  11/1/05 - OP Note for EXPLORATORY LAPAROSCOPY with Dr. Mensah   MEDICATION LIST Internal    LABS     BIOPSIES/PATHOLOGY RELATED TO DIAGNOSIS Internal [facility]:  11/7/11 - Colon Biopsy (Case: [id number])   DIAGNOSTIC PROCEDURES     COLONOSCOPY Internal [facility]:  12/10/12 - Colonoscopy   IMAGING (DISC & REPORT) N/A

## 2022-03-24 LAB
FERRITIN SERPL-MCNC: 40 NG/ML (ref 8–252)
PATH REPORT.COMMENTS IMP SPEC: NORMAL
PATH REPORT.COMMENTS IMP SPEC: NORMAL
PATH REPORT.FINAL DX SPEC: NORMAL
PATH REPORT.MICROSCOPIC SPEC OTHER STN: NORMAL
PATH REPORT.MICROSCOPIC SPEC OTHER STN: NORMAL

## 2022-03-28 NOTE — RESULT ENCOUNTER NOTE
Here are your lab results from your recent visit...  -Your CBC labs (which includes blood labs looking for signs of infection or anemia) looks much better with the hemoglobin improving further from a low of 9.1 (2/23/22), then 9.6 (3/4/22), and now 11.3 (on 3/23/22).  Your ferritin is also continuing to improve- now at 40, from a low of 12 a month ago.    -The high reticulocyte count is a sign that your bone marrow is producing new blood, either due to blood loss (as it was high before you started iron and B12), but now is likely high because you are producing more blood with improved iron/B12 levels.  The peripheral smear is a report from the pathologist looks at how the cells look under the microscope, and it showed mild anemia at this point, but no other concerning findings which is good.    Given the improvement and near-normal hemoglobin levels, I don't think you need to come back for labs in three weeks.  We can check them again at your next appointment.      Please let me know if you have any questions.  Best,   Lev Tristan MD

## 2022-03-30 ENCOUNTER — THERAPY VISIT (OUTPATIENT)
Dept: PHYSICAL THERAPY | Facility: CLINIC | Age: 82
End: 2022-03-30
Payer: MEDICARE

## 2022-03-30 DIAGNOSIS — M25.551 HIP PAIN, RIGHT: ICD-10-CM

## 2022-03-30 PROCEDURE — 97110 THERAPEUTIC EXERCISES: CPT | Mod: GP | Performed by: PHYSICAL THERAPIST

## 2022-03-30 PROCEDURE — 97112 NEUROMUSCULAR REEDUCATION: CPT | Mod: GP | Performed by: PHYSICAL THERAPIST

## 2022-03-31 ENCOUNTER — TELEPHONE (OUTPATIENT)
Dept: OPHTHALMOLOGY | Facility: CLINIC | Age: 82
End: 2022-03-31
Payer: MEDICARE

## 2022-03-31 ENCOUNTER — TELEPHONE (OUTPATIENT)
Dept: PALLIATIVE MEDICINE | Facility: CLINIC | Age: 82
End: 2022-03-31
Payer: MEDICARE

## 2022-03-31 NOTE — TELEPHONE ENCOUNTER
Reason for call:  Other   Patient called regarding (reason for call):   Additional comments: pt wants to speak with Dr Moore regarding her chronic pain. Pt would not give me more info    Phone number to reach patient:  Home number on file [phone] (home)    Can we leave a detailed message on this number?  YES    Travel screening: Not Applicable        Jovanni Arora    [facility] [place]

## 2022-03-31 NOTE — TELEPHONE ENCOUNTER
"[facility] Call Center    Phone Message    May a detailed message be left on voicemail: yes     Reason for Call: Other: Nghia calling on behalf of pt. She states that pt picked up her new glasses yesterday and they do not work for her. She feels that she needs more power at \"arms length\" and that it should be written as a progressive trifocal. Please call Nghia to discuss. Thank you.      Action Taken: Message routed to:  [facility] EYE    Travel Screening: Not Applicable                                                                        "

## 2022-03-31 NOTE — TELEPHONE ENCOUNTER
Called pt. She would like something to take when their is severe pain-in hip and down leg/burning in the foot. She has only been taking tylenol. Had PT and pain afterwards. She states that is is unsure why her docor will no longer give her the medication (meaning Norco) but that it should be replaced with something else. We discussed her previous Norco supply and dosing and determined she only received #30. She reports being out of medication for a few days. Called pharmacy and confirmed she recieved #30 not #60.     Called pt to advised that we would adjust medication so that she is able to  her medication today.     E-Prescribing Status: Receipt confirmed by pharmacy (3/31/2022  1:41 PM CDT)    Claudia LANDNO, RN Care Coordinator  [facility]  Pain Management

## 2022-04-04 ENCOUNTER — TELEPHONE (OUTPATIENT)
Dept: SLEEP MEDICINE | Facility: CLINIC | Age: 82
End: 2022-04-04
Payer: MEDICARE

## 2022-04-04 NOTE — TELEPHONE ENCOUNTER
Orders from [facility] have been signed, faxed 4/01/22 and scanned into chart.    CHARLES Sanders

## 2022-04-05 NOTE — TELEPHONE ENCOUNTER
Pt to f/u in about 4 weeks following last visit with undilated visual field per Dr. Charles    Scheduled with staff provider April 15th-- pt/friend aware of date/time/location at [facility].    Pt not happy with new glasses-- pulls glasses up to see computer distance.  Commented to recheck glasses at visit    Wayne Yates RN 9:49 AM 04/05/22

## 2022-04-12 ENCOUNTER — OFFICE VISIT (OUTPATIENT)
Dept: PALLIATIVE MEDICINE | Facility: CLINIC | Age: 82
End: 2022-04-12
Payer: MEDICARE

## 2022-04-12 VITALS — SYSTOLIC BLOOD PRESSURE: 151 MMHG | OXYGEN SATURATION: 98 % | DIASTOLIC BLOOD PRESSURE: 77 MMHG | HEART RATE: 62 BPM

## 2022-04-12 DIAGNOSIS — G57.01 PIRIFORMIS SYNDROME, RIGHT: ICD-10-CM

## 2022-04-12 DIAGNOSIS — M16.11 OSTEOARTHRITIS OF RIGHT HIP, UNSPECIFIED OSTEOARTHRITIS TYPE: ICD-10-CM

## 2022-04-12 DIAGNOSIS — M25.551 HIP PAIN, RIGHT: Primary | ICD-10-CM

## 2022-04-12 PROCEDURE — 99213 OFFICE O/P EST LOW 20 MIN: CPT | Performed by: PHYSICAL MEDICINE & REHABILITATION

## 2022-04-12 ASSESSMENT — PAIN SCALES - GENERAL: PAINLEVEL: NO PAIN (1)

## 2022-04-12 NOTE — PATIENT INSTRUCTIONS
You can call the number below to request a repeat hip injection, I would recommend waiting until June, this would be 3 months from your prior injection.  Call the number below for refills.    Take care,    Charly Moore DO  [facility]      ----------------------------------------------------------------  Clinic Number:  [phone]   Call with any questions about your care and for scheduling assistance.   Calls are returned Monday through Friday between 8 AM and 4:30 PM. We usually get back to you within 2 business days depending on the issue/request.    If we are prescribing your medications:  For opioid medication refills, call the clinic or send a [employer] message 7 days in advance.  Please include:  Name of requested medication  Name of the pharmacy.  For non-opioid medications, call your pharmacy directly to request a refill. Please allow 3-4 days to be processed.   Per [place] State Law:  All controlled substance prescriptions must be filled within 30 days of being written.    For those controlled substances allowing refills, pickup must occur within 30 days of last fill.      We believe regular attendance is key to your success in our program!    Any time you are unable to keep your appointment we ask that you call us at least 24 hours in advance to cancel.This will allow us to offer the appointment time to another patient.   Multiple missed appointments may lead to dismissal from the clinic.

## 2022-04-12 NOTE — PROGRESS NOTES
[facility]    Date of visit: 4/12/22       Assessment:  Sister Betty Buckner is a 81 year old medically complex patient with past medical history including: Atrial fibrillation, VLH, History of DVT, CHF, HLD, HTN, DM 2, Stage 3 CKD, RLS, Depression, History of etoh abuse (remission 30+ years) who presents for evaluation and treatment of the following chronic pain conditions:     1. Right sided hip and leg pain: Betty reports a long standing history of right hip and leg pain. They've been having worsening right lateral hip and groin pain in the last 2-3 months. They also have a long standing history of pain that starts in her right low back/upper buttock and radiates laterally and posteriorly down the right leg to the foot. They had a positive salomón and hip scour and significant right GT bursa tenderness on exam. Initially was getting improvement with piriformis injection but declining benefit over time. Significant improvement noted with hip injection on 3/9/22. Appears that their hip pain is likely due to a combination of right sided gluteal weakness/dysfunction and severe right hip OA.     Plan:  The following recommendations were given to the patient. Diagnosis, treatment options, risks, benefits, and alternatives were discussed, and all questions were answered. The patient expressed understanding of the plan for management.      I am recommending a multidisciplinary treatment plan to help this patient better manage her pain.  This includes:      1. Physical Therapy: Continue chronic pain PT  2. Clinical Health Pain Psychologist: Coping well defer.  3. Diagnostic Studies: None at this time.  4. Medication Management:   1. Continue Norco 7.5-325mg BID prn.  2. Continue methocarbamol 750mg TID prn.   3. Schedule tylenol 1000mg TID prn while pain is severe.  4. CSA/UDS - 9/2/21  5. Further procedures recommended:   1. Right hip joint injection can be  repeated every 3 months.  6. Referral: chiropractic referral placed.  7. Follow up: As needed.      DO [facility]           Chief complaint:   Chief Complaint   Patient presents with     Pain       Interval history:  Betty eTe is a 80 year old female last seen by me on 2/25/22.        Since her last visit, Betty Tee reports:    -They had a right hip injection on 3/9/22 with a flare up for a few hours then had significant pain relief since then.    -They have been able to use less norco as their pain improved.     -When they get tired and later in the day as their hip pain increases then they take 1 tablet.    -They're also working with [facility] in PT which is helpful. They continue doing their exercises regularly.    -[facility] appointment coming up in may.    -They were also found to have anemia at their regular check up a few months ago, they are going to be following up with their colon surgeon soon.    Pain scores:   Pain intensity on average is 1 on a scale of 0-10.        Pain Treatments:  1. Medications:       Current pain medications:  -Tylenol 1000mg q8h prn - Usually takes 1-2 times a day  -Escitalopram 20mg daily   -Norco 7.5-325mg prn - takes it but not regularly   -methocarbamol 750mg tid prn   -Voltaren 1% gel qid prn       Previous pain medications:  -Tramadol 50mg prn  -Tizanidine, flexeril - nh   -Gabapentin 300mg TID - memory difficulty  2. Physical Therapy: hasn't completed PT for low back or hip pain                TENS unit: hasn't tried  3. Pain psychology: hasn't tried  4. Surgery: Lumbar spine surgery in the 1960s.  5. Injections:   -Right piriformis injection on 4/1/21 with 60% relief for 8 weeks. Repeated in June with approx 50% relief.  -Two epidural injections (right L5 tfesi), feb 2020 was helpful the next day but she had a fall right after, aug 2020 procedure - not  helpful, had worsening pain  6. Alternative Therapies:               Chiropractic: hasn't tried               Acupuncture: hasn't tried          Side Effects: no side effect    Medications:  Current Outpatient Medications   Medication Sig Dispense Refill     ACCU-CHEK SHANNON PLUS test strip USE TO TEST BLOOD SUGARS 3 TIMES DAILY 300 strip 5     acetaminophen (TYLENOL) 500 MG tablet Take 1,000 mg by mouth every 8 hours as needed (max 6 tablets/24 hours, 2 tablets/dose)        acyclovir (ZOVIRAX) 400 MG tablet Take 1 tablet (400 mg) by mouth 3 times daily For a couple days 15 tablet 2     acyclovir (ZOVIRAX) 5 % external ointment Apply topically 6 times daily As needed for outbreaks 15 g 3     albuterol (PROAIR HFA, PROVENTIL HFA, VENTOLIN HFA) 108 (90 BASE) MCG/ACT inhaler Inhale 2 puffs into the lungs every 6 hours 1 Inhaler 3     alendronate (FOSAMAX) 70 MG tablet Take 1 tablet (70 mg) by mouth every 7 days 12 tablet 0     anakinra (KINERET) 100 MG/0.67ML SOSY injection 100 mg every day x 3 days as needed for flare ups 6.7 mL 3     artificial tears OINT ophthalmic ointment Place 1 g into both eyes At Bedtime 7 g 11     atorvastatin (LIPITOR) 10 MG tablet TAKE 1/2 TABLET BY MOUTH EVERY DAY 45 tablet 2     azithromycin (ZITHROMAX) 250 MG tablet Take 1 tablet (250 mg) by mouth daily 30 tablet 11     BD PEN NEEDLE JANE 2ND GEN 32G X 4 MM miscellaneous USE 4 DAILY AS DIRECTED. 400 each 1     blood glucose (NO BRAND SPECIFIED) lancets standard Use to test blood sugar 3 times daily or as directed 100 lancet 3     cevimeline (EVOXAC) 30 MG capsule Take 1 capsule (30 mg) by mouth 3 times daily 270 capsule 2     Cholecalciferol (VITAMIN D3) 50 MCG (2000 UT) CAPS Take 1 tablet (50 mcg) by mouth daily - Oral 90 capsule 2     cyanocobalamin (VITAMIN B-12) 1000 MCG tablet Take 1,000 mcg by mouth daily       cyanocobalamin (VITAMIN B-12) 1000 MCG tablet Take 1 tablet (1,000 mcg) by mouth daily 30 tablet 1     escitalopram  (LEXAPRO) 20 MG tablet TAKE 1 TABLET BY MOUTH EVERY DAY 90 tablet 0     ferrous sulfate (FEROSUL) 325 (65 Fe) MG tablet Take 1 tablet (325 mg) by mouth daily (with breakfast) 30 tablet 1     fluticasone (FLONASE) 50 MCG/ACT nasal spray Spray 1-2 sprays into both nostrils daily as needed for allergies 18.2 mL 11     fluticasone-vilanterol (BREO ELLIPTA) 100-25 MCG/INH inhaler Inhale 1 puff into the lungs daily 1 Inhaler 11     HYDROcodone-acetaminophen (NORCO) 7.5-325 MG per tablet Take 1 tablet by mouth every 12 hours OK to fill and start 03/31/22 60 tablet 0     hydroxychloroquine (PLAQUENIL) 200 MG tablet Take 2 tablets (400 mg) by mouth daily Get annual eye exams for hydroxychloroquine (Plaquenil) monitoring and fax to [phone] 180 tablet 0     insulin aspart (NOVOLOG FLEXPEN) 100 UNIT/ML pen Inject 11 Units Subcutaneous 3 times daily (with meals) 15 mL 2     insulin glargine (BASAGLAR KWIKPEN) 100 UNIT/ML pen Sig: INJECT 28 UNITS SUBCUTANEOUS DAILY (down from 32 units in 3/22) 15 mL 0     ketoconazole (NIZORAL) 2 % external cream Apply topically 2 times daily as needed for itching 60 g 1     KLOR-CON 20 MEQ CR tablet TAKE 2 TABLETS (40 MEQ) BY MOUTH EVERY MORNING AND 1 TABLET (20 MEQ) EVERY EVENING. 270 tablet 3     lidocaine (LIDODERM) 5 % patch APPLY PATCH TO PAINFUL AREA FOR UP TO 12 H WITHIN A 24 H PERIOD. REMOVE AFTER 12 HOURS. 30 patch 2     loratadine (CLARITIN) 10 MG tablet TAKE 1 TABLET BY MOUTH EVERY DAY 90 tablet 3     methocarbamol (ROBAXIN) 750 MG tablet Take 1 tablet (750 mg) by mouth 3 times daily as needed for muscle spasms 90 tablet 3     metoprolol succinate ER (TOPROL-XL) 50 MG 24 hr tablet Take 1 tablet (50 mg) by mouth 2 times daily 90 tablet 3     montelukast (SINGULAIR) 10 MG tablet TAKE 1 TABLET BY MOUTH EVERYDAY AT BEDTIME 90 tablet 0     pantoprazole (PROTONIX) 40 MG EC tablet Take 1 tablet (40 mg) by mouth daily (replaces famotidine- should stop taking famotidine) 90 tablet 1      predniSONE (DELTASONE) 5 MG tablet Take 1 tablet (5 mg) by mouth daily 30 tablet 0     rivaroxaban ANTICOAGULANT (XARELTO ANTICOAGULANT) 20 MG TABS tablet TAKE 1 TABLET BY MOUTH DAILY WITH DINNER 90 tablet 3     Semaglutide, 1 MG/DOSE, (OZEMPIC, 1 MG/DOSE,) 4 MG/3ML SOPN Inject 1 mg Subcutaneous once a week 9 mL 1     spironolactone (ALDACTONE) 25 MG tablet Take 2 tablets (50 mg) by mouth daily 180 tablet 3     torsemide (DEMADEX) 10 MG tablet TAKE ONE TAB (10 MG) WITH ONE 20 MG TAB TO = 30 MG DAILY IN AFTERNOON. 90 tablet 3     torsemide (DEMADEX) 20 MG tablet TAKE 2 TABLETS (40 MG) BY MOUTH EVERY MORNING AND 1 TABLET (20 MG) DAILY AT 2 PM. TAKE THE AFTERNOON DOSE WITH AN EXTRA 10 MG TAB FOR A TOTAL OF 30 MG IN THE AFTERNOON 270 tablet 3     traZODone (DESYREL) 50 MG tablet Take 0.5-1.5 tablets (25-75 mg) by mouth nightly as needed for sleep 135 tablet 1     VITAMIN D3 50 MCG (2000 UT) tablet TAKE 1 TABLET (50 MCG) BY MOUTH DAILY - ORAL       COMPOUNDED NON-CONTROLLED SUBSTANCE (CMPD RX) - PHARMACY TO MIX COMPOUNDED MEDICATION Estriol 1 mg/g Apply small amount to finger and apply to inside vagina daily for 2 weeks then twice weekly Route: vaginally Dispense 30 grams 11 refills (Patient not taking: Reported on 4/12/2022) 30 g 11       Medical History: any changes in medical history since they were last seen? Diagnosed with anemia, starting iron and b12 replacement.    Review of Systems:  Positive for:  anemia, back pain, arthritis, paresthesias      BP (!) 151/77   Pulse 62   SpO2 98%   Gen: NAD pleasant  Neuro/MSK: Gait is antalgic, Uses fww for ambulation.   Exam deferred for conversation.        BILLING TIME DOCUMENTATION:   The total TIME spent on this patient on the date of the encounter/appointment was 20 minutes.      TOTAL TIME includes:   Time spent preparing to see the patient (reviewing records and tests)   Time spent face to face (or over the phone) with the patient   Time spent ordering tests,  medications, procedures and referrals   Time spent Referring and communicating with other healthcare professionals   Time spent documenting clinical information in Epic           DO [facility]

## 2022-04-14 ENCOUNTER — THERAPY VISIT (OUTPATIENT)
Dept: PHYSICAL THERAPY | Facility: CLINIC | Age: 82
End: 2022-04-14
Payer: MEDICARE

## 2022-04-14 DIAGNOSIS — D50.9 IRON DEFICIENCY ANEMIA, UNSPECIFIED IRON DEFICIENCY ANEMIA TYPE: ICD-10-CM

## 2022-04-14 DIAGNOSIS — M25.551 HIP PAIN, RIGHT: Primary | ICD-10-CM

## 2022-04-14 PROCEDURE — 97112 NEUROMUSCULAR REEDUCATION: CPT | Mod: GP | Performed by: PHYSICAL THERAPIST

## 2022-04-14 PROCEDURE — 97110 THERAPEUTIC EXERCISES: CPT | Mod: GP | Performed by: PHYSICAL THERAPIST

## 2022-04-14 RX ORDER — FERROUS SULFATE 325(65) MG
TABLET ORAL
Qty: 30 TABLET | Refills: 1 | Status: SHIPPED | OUTPATIENT
Start: 2022-04-14 | End: 2022-05-09

## 2022-04-14 NOTE — TELEPHONE ENCOUNTER
Prescription approved per [facility] Refill Protocol.  Sophia Hemphill RN

## 2022-04-14 NOTE — PROGRESS NOTES
Subjective:  HPI  Physical Exam                    Objective:  System    Physical Exam    General     ROS    Assessment/Plan:    PROGRESS  REPORT    Progress reporting period is from 2-10-22 to 4-14-22.       SUBJECTIVE  Subjective: For about the last 2 weeks had been doing really well and pain level at 0-1/10. Then this morning, she turned wrong, twisted leg and pain up to 7/10 this afternoon. Rates pain management ability at 8/10 (pre pain PT at 4/10). Plans to pace, self care her way through this pain flare.  Much more confident in her ability to manage her pain, not afraid of the pain now.    Current Pain level:  (current 7/10 but past 1.5 weeks was 0-1/10).      Initial Pain level: 9/10.   Changes in function:  Yes (See Goal flowsheet attached for changes in current functional level)  Adverse reaction to treatment or activity: activity - this morning, pt states turned, twisted at home while standing and this flared her hip, back pain up to 7/10    OBJECTIVE  Changes noted in objective findings:  The objective findings below are from DOS 4-14-22.  Objective: Due to pain flare earlier today, did limited ROM assessment. Gait is antalgic today due to the pain flare but pt reports was walking well wit her 4WW when pain at 0-1/10. Pt able to verbalizes functional sequence to breathe first, posture/stabilize second, move third.     ASSESSMENT/PLAN  Updated problem list and treatment plan: Diagnosis 1:  R hip pain  Pain -  self management, education and home program  Decreased ROM/flexibility - manual therapy, therapeutic exercise and home program  Decreased strength - therapeutic exercise, therapeutic activities and home program  Decreased proprioception - neuro re-education, therapeutic activities and home program  Impaired gait - gait training and home program  Impaired muscle performance - neuro re-education and home program  Decreased function - therapeutic activities and home program  Impaired posture - neuro  re-education and home program  STG/LTGs have been met or progress has been made towards goals:  Yes (See Goal flow sheet completed today.)  Assessment of Progress: The patient's condition is improving.  Self Management Plans:  Patient has been instructed in a home treatment program.  Patient  has been instructed in self management of symptoms.  I have re-evaluated this patient and find that the nature, scope, duration and intensity of the therapy is appropriate for the medical condition of the patient.  Betty continues to require the following intervention to meet STG and LTG's:  PT    Recommendations:  This patient would benefit from continued therapy.     Frequency:  2 X a month, once daily  Duration:  for 3 months        Please refer to the daily flowsheet for treatment today, total treatment time and time spent performing 1:1 timed codes.

## 2022-04-15 ENCOUNTER — OFFICE VISIT (OUTPATIENT)
Dept: OPHTHALMOLOGY | Facility: CLINIC | Age: 82
End: 2022-04-15
Attending: OPHTHALMOLOGY
Payer: MEDICARE

## 2022-04-15 DIAGNOSIS — Z79.899 HIGH RISK MEDICATION USE: Primary | ICD-10-CM

## 2022-04-15 DIAGNOSIS — Z79.899 LONG-TERM USE OF PLAQUENIL: ICD-10-CM

## 2022-04-15 PROCEDURE — 92083 EXTENDED VISUAL FIELD XM: CPT | Performed by: OPHTHALMOLOGY

## 2022-04-15 PROCEDURE — 99212 OFFICE O/P EST SF 10 MIN: CPT | Performed by: OPHTHALMOLOGY

## 2022-04-15 ASSESSMENT — CUP TO DISC RATIO
OS_RATIO: 0.3
OD_RATIO: 0.3

## 2022-04-15 ASSESSMENT — CONF VISUAL FIELD
METHOD: COUNTING FINGERS
OS_NORMAL: 1
OD_NORMAL: 1

## 2022-04-15 ASSESSMENT — SLIT LAMP EXAM - LIDS
COMMENTS: NORMAL, DECREASED TEAR LAKE
COMMENTS: NORMAL, DECREASED TEAR LAKE

## 2022-04-15 ASSESSMENT — VISUAL ACUITY
OD_CC: 20/20
OS_CC: 20/20
CORRECTION_TYPE: GLASSES
METHOD: SNELLEN - LINEAR

## 2022-04-15 ASSESSMENT — REFRACTION_MANIFEST
OD_AXIS: 175
OS_SPHERE: PLANO
OS_CYLINDER: SPHERE
OD_ADD: +3.00
OS_ADD: +3.00
OD_CYLINDER: +0.50
OD_SPHERE: +0.00

## 2022-04-15 ASSESSMENT — TONOMETRY
OD_IOP_MMHG: 08
IOP_METHOD: ICARE
OS_IOP_MMHG: 09

## 2022-04-15 ASSESSMENT — EXTERNAL EXAM - LEFT EYE: OS_EXAM: NORMAL

## 2022-04-15 NOTE — PROGRESS NOTES
HPI  Betty Tee is a 81 year old female with Sjogren's syndrome, PMR who presents for plaquenil screening.    Vision overall stable. Wakes up in the morning with matted lids, uses hot packs to looses up mucoid discharge left eye> right eye , with redness left eye. Uses CPAP. Uses AT several times per week.  No flashes/floaters.      POH: CE/IOL [facility] >5 years ago, Sjogren syndrome with dry eye  PMH: Type 2 DM, A fib on coumadin, ANGELICA on CPAP  FHx: Glaucoma in sister and uncle    Assessment & Plan      #High risk medication use (Plaquenil)  - For PMR   - Plaquenil start 4/2019, 400mg at bedtime (4.71 mg/kg)  - Also Pred 5mg daily  - visual field mac top 4/15/2022   right eye nonspecific defects   Left eye nonspecific defects  - VF 10-2 (s/p dilation, 3/2022)   right eye:superior temporal and inferior temporal loss, reliable   left eye: superior loss, reliable  - FAF   right eye: tr stippled perifoveal hyperautofluoresence   left eye: normal  - OCT Mac   right eye: hyperreflectivity in OPL, otherwise normal   left eye: normal    No toxicity, continue annual monitoring    #Sjogren's syndrome  #Dry eyes  - AT ointment at bedtime, AT QID, WC twice a day    # Presbyopia  Patient upset with current progressive lens  Advised remake in classic bifocal       -----------------------------------------------------------------------------------    Patient disposition:   Return in about 11 months (around 3/15/2023) for mac top LEFT EYE FIRST, Annual Visit, OCT Macula, color plates, FAF. or sooner as needed.      Attending Physician Attestation:  Complete documentation of historical and exam elements from today's encounter can be found in the full encounter summary report (not reduplicated in this progress note).  I personally obtained the chief complaint(s) and history of present illness.  I confirmed and edited as necessary the review of systems, past medical/surgical history, family history, social  history, and examination findings as documented by others; and I examined the patient myself.  I personally reviewed the relevant tests, images, and reports as documented above.  I formulated and edited as necessary the assessment and plan and discussed the findings and management plan with the patient and family. - Santy Lee MD

## 2022-04-18 NOTE — PROGRESS NOTES
Colon and Rectal Surgery Clinic Note    RE: Betty Tee  : 1940  RAVI: 2022    Sister Betty is an 81 year old woman who presents today for rectal bleeding and anemia.  She is seen today with her friend Nghia.    HPI:    Sigmoid resection with total abdominal hysterectomy and bilateral salpingo-oophorectomy on 2011 with Dr. Johanna Siddiqi for colo-uterine fistula  FINAL DIAGNOSIS:   A-C.  Sigmoid colon (specimens B and C), uterus and bilateral fallopian   tubes and ovaries; segmental colon resections, hysterctomy and bilateral   salpingo-oophorectomy:        - Colonic diverticulosis complicated by diverticulitis and fistula   formation between colon and left          adnexal structures, associated with marked inflammation and   liquefactive necrosis of portions          of left ovary and fallopian tube.        - Additional findings:             - Uterus with endometrial cystic atrophy and two myometrial   leiomyomas (0.8 cm and               5.1 cm in greatest dimension, respectively).             - Cervix with no diagnostic alteration.        - Multiple benign pericolonic lymph nodes.        - Negative for malignancy.     Diverting ileostomy was taken down 2012    Dr. Siddiqi saw her last in  did not feel as though the patient should have a colonoscopy given significant medical history and she did not feel the patient would tolerate further surgery were a colon cancer to be found. Last colonoscopy was in .    PMH: DM II, Sjogren's syndrome, inflammatory arthritis and chronic pain, CHF with preserved EF and follows with Dr. Rosa, interstitial lung disease with moderate restriction and follows with Dr. Tillman, chronic anticoagulation with Xarelto for atrial fibrillation    Interval History: Sister Betty as noted to have anemia and a 10 pound weight loss in 5 months in February of this year. She was started on iron and B12 and hgb has improved. She has never noticed rectal bleeding.  "Her stools are currently black since being on the iron but she does not remember them being black before the iron supplement. She had a positive FIT test on 3/11/22. No change in bowel habits or stool caliber. Her appetite is good. No nausea or vomiting. She gets intermittent stabbing RLQ abdominal pain but this does not last long and resolves on its own.     Physical examination:  Examination was chaperoned by Juana Reid NP .     Vitals: /54 (BP Location: Left arm, Patient Position: Sitting, Cuff Size: Adult Regular)   Pulse 65   Ht 5' 6\"   Wt 192 lb 11.2 oz   SpO2 95%   BMI 31.10 kg/m    BMI= Body mass index is 31.1 kg/m .    Alert, oriented, in no acute distress    Laboratory data:    Recent Labs   Lab Test 03/23/22  1221 03/04/22  1305 02/23/22  1229 02/11/22  1552 01/05/21  1508 12/04/20  1435 03/04/20  0335 03/03/20  1940   WBC 10.2 9.0   < > 11.5*   < > 10.3   < > 13.5*   HGB 11.3* 9.6*   < > 9.1*   < > 13.2   < > 13.8    250   < > 283   < > 189   < > 238   CR  --  0.99  --  1.00   < > 1.49*   < > 0.94   ALBUMIN  --   --   --  3.3*   < > 3.1*   < > 3.2*   BILITOTAL  --   --   --   --   --  0.6  --  1.1   ALKPHOS  --   --   --   --   --  95  --  82   ALT  --   --   --  17   < > 20   < > 21   AST  --   --   --  15   < > 18   < > 13   INR  --   --   --   --   --   --   --  1.36*    < > = values in this interval not displayed.       Assessment/plan: I had a long discussion with Sister Betty and her friend, Nghia, regarding her anemia and positive FIT test. Discussed the possibility of a colon cancer and if this were the case, would it be safe and would she want an operation for it. Would like her to discuss this with Dr. Rosa and Dr. Tillman to help make this decision. Could also consider colonoscopy just to get more information on the source of her anemia. If she does decide to proceed with a scope, would also get an EGD at the same time. Will defer management of her iron " supplement to her PCP and will recheck hemoglobin today. Patient's questions were answered to her stated satisfaction and she is in agreement with this plan.    For details of past medical history, surgical history, family history, medications, allergies, and review of systems, please see details below.    Medical history:  Past Medical History:   Diagnosis Date     Alcohol abuse, in remission      Allergic rhinitis, cause unspecified     allegra helps when she takes it     Antiplatelet or antithrombotic long-term use      Atrial fibrillation (H)     in hosp in 11/11 after surgery w/ fluid overload     Cardiomegaly     LVH on stress echo- cardiac w/u at [facility] ER- neg CT scan for PE, neg stress echo in 8/06     Chest pain, unspecified      Congestive heart failure (H)      Depressive disorder      Diabetes (H)      Disorder of bone and cartilage, unspecified     osteopenia (had been on prempro), improved on 6/06 dexa, stable dexa 11/10     Diverticulosis of colon (without mention of hemorrhage)     last episode yrs ago     Essential hypertension, benign      Follicular bronchiolitis (H)     associated with Sjogrens, dx by chest CT showing mosaic attenuation and air trapping     Gastro-oesophageal reflux disease      ILD (interstitial lung disease) (H)     associated with Sjogrens, also has mildly elevated IgG4, first noted on chest CT 2015 (mild changes) and also has small airways disease; ILD improved on follow up chest CT 2018.     Insomnia, unspecified     weaned off clonazepam     Irregular heart beat      Lumbago 7/09    MRI with [facility], now seeing Dr. Cain for sciatic sx's     Major depressive disorder, recurrent episode, moderate (H)      Obstructive sleep apnea     uses cpap     Osteoarthrosis, unspecified whether generalized or localized, unspecified site      Sjogren's syndrome (H)     + RG and SSA and lip bx     Sleep apnea      Tobacco use disorder     chantix in 9/07, started again in 6/08, working        Surgical history:  Past Surgical History:   Procedure Laterality Date     APPENDECTOMY       BACK SURGERY  1962     BACK SURGERY  1962     BIOPSY BREAST  09/27/2002    Biopsy Left Breast     BIOPSY BREAST Left 09/27/2002     BREAST SURGERY       CARDIAC SURGERY       CARDIAC SURGERY       CHOLECYSTECTOMY  1990's?     CHOLECYSTECTOMY       COLECTOMY LEFT  11/07/2011    Procedure:COLECTOMY LEFT; Laparoscopic mobilization of splenic flexture, sigmoid colectomy, coloprotoscopy, loop illeostomy; Surgeon:CK CASTANEDA; Location:[facility] OR     COLECTOMY LEFT  11/07/2011     COLONOSCOPY  1990,s     ENT SURGERY       EYE SURGERY  2012     FLEXIBLE SIGMOIDOSCOPY  11/03/2011     HYSTERECTOMY TOTAL ABDOMINAL, BILATERAL SALPINGO-OOPHORECTOMY, COMBINED  11/07/2011    Procedure:COMBINED HYSTERECTOMY TOTAL ABDOMINAL, BILATERAL SALPINGO-OOPHORECTOMY; total abdominal hysterectomy, bilateral salpingo-oophorectomy; Surgeon:ALETA MANUEL; Location:[facility] OR     HYSTERECTOMY TOTAL ABDOMINAL, BILATERAL SALPINGO-OOPHORECTOMY, COMBINED  11/07/2011     ILEOSTOMY  02/01/2012    takedown loop ileostomy      INSERT STENT URETER  11/07/2011    Procedure:INSERT STENT URETER; Placement of Bilateral Ureteral Stents ; Surgeon:PRANEETH BRYANT; Location:[facility] OR     SIGMOIDECTOMY  02/01/2012    Dr. Castaneda, Sigmoidectomy for diverticular abscess, iliostomy afterwards until repair     SIGMOIDOSCOPY FLEXIBLE  11/03/2011    Procedure:SIGMOIDOSCOPY FLEXIBLE; Flexible Sigmoidoscopy; Surgeon:CK CASTANEDA; Location:[facility] OR     TAKEDOWN ILEOSTOMY  02/01/2012    Procedure:TAKEDOWN ILEOSTOMY; Takedown Loop Ileostomy ; Surgeon:CK CASTANEDA; Location:[facility] OR     URETERAL STENT PLACEMENT  11/07/2011     ZZC APPENDECTOMY  1970's?     ZZC NONSPECIFIC PROCEDURE  11/2005    exploratory abd lap, adhesions, resolved RLQ pain, diverticulitis episodes       Family history:  Family History   Problem Relation Age of Onset     C.A.D. Mother 63        MI- first at age 63      Heart Disease Mother      Hypertension Mother      Cerebrovascular Disease Mother      Hyperlipidemia Mother      Coronary Artery Disease Mother         MI-first at age 63     Other - See Comments Mother         cerebrovascular disease      Alcohol/Drug Father      Alzheimer Disease Father      Dementia Father      Hypertension Father      Hyperlipidemia Father      Substance Abuse Father      Diabetes Sister      Hypertension Sister      Hyperlipidemia Sister      Substance Abuse Sister      Asthma Sister      C.A.D. Sister 52        Minor MI- age 50's     Heart Disease Sister      Hypertension Sister      Hypertension Sister      Hypertension Brother      Cancer - colorectal Sister 48        Late 40's early 50's     Prostate Cancer Brother 74        Dx'd age 74     Gastrointestinal Disease Sister         Diverticulitis     Gastrointestinal Disease Brother         Diverticulitis     Lipids Sister      Lipids Sister      Parkinsonism Brother      Diabetes Sister      Heart Disease Sister         CHF     Cancer Sister         lung, smoker     Substance Abuse Sister      Substance Abuse Brother      Asthma Sister      Cancer Sister      Breast Cancer Daughter      Prostate Cancer Brother      Hyperlipidemia Brother      Diabetes Other      Hypertension Other      Coronary Artery Disease Sister         minor MI-age 50's     Heart Disease Sister      Hypertension Sister      Hypertension Brother      Hypertension Sister      Colon Cancer Sister      Prostate Cancer Brother      Diverticulitis Sister      Diverticulitis Brother      Parkinsonism Brother      Lung Cancer Sister      Breast Cancer Daughter      Heart Disease Sister         CHF     Diabetes Sister      Asthma Sister        Medications:  Current Outpatient Medications   Medication Sig Dispense Refill     ACCU-CHEK SHANNON PLUS test strip USE TO TEST BLOOD SUGARS 3 TIMES DAILY 300 strip 5     acetaminophen (TYLENOL) 500 MG tablet Take 1,000 mg by mouth  every 8 hours as needed (max 6 tablets/24 hours, 2 tablets/dose)        acyclovir (ZOVIRAX) 400 MG tablet Take 1 tablet (400 mg) by mouth 3 times daily For a couple days 15 tablet 2     acyclovir (ZOVIRAX) 5 % external ointment Apply topically 6 times daily As needed for outbreaks 15 g 3     albuterol (PROAIR HFA, PROVENTIL HFA, VENTOLIN HFA) 108 (90 BASE) MCG/ACT inhaler Inhale 2 puffs into the lungs every 6 hours 1 Inhaler 3     alendronate (FOSAMAX) 70 MG tablet Take 1 tablet (70 mg) by mouth every 7 days 12 tablet 0     anakinra (KINERET) 100 MG/0.67ML SOSY injection 100 mg every day x 3 days as needed for flare ups 6.7 mL 3     artificial tears OINT ophthalmic ointment Place 1 g into both eyes At Bedtime 7 g 11     atorvastatin (LIPITOR) 10 MG tablet TAKE 1/2 TABLET BY MOUTH EVERY DAY 45 tablet 2     azithromycin (ZITHROMAX) 250 MG tablet Take 1 tablet (250 mg) by mouth daily 30 tablet 11     BD PEN NEEDLE JANE 2ND GEN 32G X 4 MM miscellaneous USE 4 DAILY AS DIRECTED. 400 each 1     blood glucose (NO BRAND SPECIFIED) lancets standard Use to test blood sugar 3 times daily or as directed 100 lancet 3     cevimeline (EVOXAC) 30 MG capsule Take 1 capsule (30 mg) by mouth 3 times daily 270 capsule 2     Cholecalciferol (VITAMIN D3) 50 MCG (2000 UT) CAPS Take 1 tablet (50 mcg) by mouth daily - Oral 90 capsule 2     cyanocobalamin (VITAMIN B-12) 1000 MCG tablet Take 1,000 mcg by mouth daily       cyanocobalamin (VITAMIN B-12) 1000 MCG tablet Take 1 tablet (1,000 mcg) by mouth daily 30 tablet 1     escitalopram (LEXAPRO) 20 MG tablet TAKE 1 TABLET BY MOUTH EVERY DAY 90 tablet 0     ferrous sulfate (FEROSUL) 325 (65 Fe) MG tablet TAKE 1 TABLET BY MOUTH EVERY DAY WITH BREAKFAST 30 tablet 1     fluticasone (FLONASE) 50 MCG/ACT nasal spray Spray 1-2 sprays into both nostrils daily as needed for allergies 18.2 mL 11     fluticasone-vilanterol (BREO ELLIPTA) 100-25 MCG/INH inhaler Inhale 1 puff into the lungs daily 1 Inhaler  11     HYDROcodone-acetaminophen (NORCO) 7.5-325 MG per tablet Take 1 tablet by mouth every 12 hours OK to fill and start 03/31/22 60 tablet 0     hydroxychloroquine (PLAQUENIL) 200 MG tablet Take 2 tablets (400 mg) by mouth daily Get annual eye exams for hydroxychloroquine (Plaquenil) monitoring and fax to [phone] 180 tablet 0     insulin aspart (NOVOLOG FLEXPEN) 100 UNIT/ML pen Inject 11 Units Subcutaneous 3 times daily (with meals) 15 mL 2     insulin glargine (BASAGLAR KWIKPEN) 100 UNIT/ML pen Sig: INJECT 28 UNITS SUBCUTANEOUS DAILY (down from 32 units in 3/22) 15 mL 0     ketoconazole (NIZORAL) 2 % external cream Apply topically 2 times daily as needed for itching 60 g 1     KLOR-CON 20 MEQ CR tablet TAKE 2 TABLETS (40 MEQ) BY MOUTH EVERY MORNING AND 1 TABLET (20 MEQ) EVERY EVENING. 270 tablet 3     lidocaine (LIDODERM) 5 % patch APPLY PATCH TO PAINFUL AREA FOR UP TO 12 H WITHIN A 24 H PERIOD. REMOVE AFTER 12 HOURS. 30 patch 2     loratadine (CLARITIN) 10 MG tablet TAKE 1 TABLET BY MOUTH EVERY DAY 90 tablet 3     methocarbamol (ROBAXIN) 750 MG tablet Take 1 tablet (750 mg) by mouth 3 times daily as needed for muscle spasms 90 tablet 3     metoprolol succinate ER (TOPROL-XL) 50 MG 24 hr tablet Take 1 tablet (50 mg) by mouth 2 times daily 90 tablet 3     montelukast (SINGULAIR) 10 MG tablet TAKE 1 TABLET BY MOUTH EVERYDAY AT BEDTIME 90 tablet 0     pantoprazole (PROTONIX) 40 MG EC tablet Take 1 tablet (40 mg) by mouth daily (replaces famotidine- should stop taking famotidine) 90 tablet 1     predniSONE (DELTASONE) 5 MG tablet Take 1 tablet (5 mg) by mouth daily 30 tablet 0     rivaroxaban ANTICOAGULANT (XARELTO ANTICOAGULANT) 20 MG TABS tablet TAKE 1 TABLET BY MOUTH DAILY WITH DINNER 90 tablet 3     Semaglutide, 1 MG/DOSE, (OZEMPIC, 1 MG/DOSE,) 4 MG/3ML SOPN Inject 1 mg Subcutaneous once a week 9 mL 1     spironolactone (ALDACTONE) 25 MG tablet Take 2 tablets (50 mg) by mouth daily 180 tablet 3     torsemide  "(DEMADEX) 10 MG tablet TAKE ONE TAB (10 MG) WITH ONE 20 MG TAB TO = 30 MG DAILY IN AFTERNOON. 90 tablet 3     torsemide (DEMADEX) 20 MG tablet TAKE 2 TABLETS (40 MG) BY MOUTH EVERY MORNING AND 1 TABLET (20 MG) DAILY AT 2 PM. TAKE THE AFTERNOON DOSE WITH AN EXTRA 10 MG TAB FOR A TOTAL OF 30 MG IN THE AFTERNOON 270 tablet 3     traZODone (DESYREL) 50 MG tablet Take 0.5-1.5 tablets (25-75 mg) by mouth nightly as needed for sleep 135 tablet 1     VITAMIN D3 50 MCG (2000 UT) tablet TAKE 1 TABLET (50 MCG) BY MOUTH DAILY - ORAL       COMPOUNDED NON-CONTROLLED SUBSTANCE (CMPD RX) - PHARMACY TO MIX COMPOUNDED MEDICATION Estriol 1 mg/g Apply small amount to finger and apply to inside vagina daily for 2 weeks then twice weekly Route: vaginally Dispense 30 grams 11 refills (Patient not taking: No sig reported) 30 g 11       Allergies:  The patientis allergic to amoxicillin-pot clavulanate, augmentin, codeine, codeine, penicillins, phenobarbital, phenobarbital, and seasonal allergies.    Social history:  Social History     Tobacco Use     Smoking status: Former Smoker     Packs/day: 0.50     Years: 10.00     Pack years: 5.00     Types: Cigarettes     Quit date: 8/1/2011     Years since quitting: 10.7     Smokeless tobacco: Never Used     Tobacco comment: 1/2 ppd   Substance Use Topics     Alcohol use: No     Alcohol/week: 0.0 standard drinks     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     Marital status: single.    Review of Systems:  Nursing Notes:   Cassia Woods  4/19/2022  3:24 PM  Signed  Chief Complaint   Patient presents with     Rectal Bleeding     Anemia and rectal bleeding        Vitals:    04/19/22 1520   BP: 128/54   BP Location: Left arm   Patient Position: Sitting   Cuff Size: Adult Regular   Pulse: 65   SpO2: 95%   Weight: 192 lb 11.2 oz   Height: 5' 6\"       Body mass index is 31.1 kg/m .    Cassia Woods CMA         20 minutes spent on the date of encounter (excluding time performing procedures with or " without biopsy) performing chart review, history and exam, documentation and further activities as noted above.    Kelechi Santana MD   Professor and Chief  Division of Colon and Rectal Surgery  [facility]      Referring Provider:  Ilene Tristan MD  [address] [address]     Primary Care Provider:  Ilene Tristan    This note was created using speech recognition software and may contain unintended word substitutions.

## 2022-04-19 ENCOUNTER — PRE VISIT (OUTPATIENT)
Dept: SURGERY | Facility: CLINIC | Age: 82
End: 2022-04-19
Payer: MEDICARE

## 2022-04-19 ENCOUNTER — OFFICE VISIT (OUTPATIENT)
Dept: SURGERY | Facility: CLINIC | Age: 82
End: 2022-04-19
Payer: MEDICARE

## 2022-04-19 ENCOUNTER — LAB (OUTPATIENT)
Dept: LAB | Facility: CLINIC | Age: 82
End: 2022-04-19
Payer: MEDICARE

## 2022-04-19 VITALS
HEIGHT: 66 IN | BODY MASS INDEX: 30.97 KG/M2 | SYSTOLIC BLOOD PRESSURE: 128 MMHG | DIASTOLIC BLOOD PRESSURE: 54 MMHG | OXYGEN SATURATION: 95 % | HEART RATE: 65 BPM | WEIGHT: 192.7 LBS

## 2022-04-19 DIAGNOSIS — R19.5 FECAL OCCULT BLOOD TEST POSITIVE: ICD-10-CM

## 2022-04-19 DIAGNOSIS — Z79.4 TYPE 2 DIABETES MELLITUS WITH HYPERGLYCEMIA, WITH LONG-TERM CURRENT USE OF INSULIN (H): ICD-10-CM

## 2022-04-19 DIAGNOSIS — D64.9 ANEMIA, UNSPECIFIED TYPE: ICD-10-CM

## 2022-04-19 DIAGNOSIS — N18.32 STAGE 3B CHRONIC KIDNEY DISEASE (H): Primary | ICD-10-CM

## 2022-04-19 DIAGNOSIS — D64.9 ANEMIA, UNSPECIFIED TYPE: Primary | ICD-10-CM

## 2022-04-19 DIAGNOSIS — E11.65 TYPE 2 DIABETES MELLITUS WITH HYPERGLYCEMIA, WITH LONG-TERM CURRENT USE OF INSULIN (H): ICD-10-CM

## 2022-04-19 DIAGNOSIS — Z90.49 HISTORY OF PARTIAL SURGICAL REMOVAL OF COLON: ICD-10-CM

## 2022-04-19 DIAGNOSIS — I50.32 CHRONIC HEART FAILURE WITH PRESERVED EJECTION FRACTION (H): ICD-10-CM

## 2022-04-19 LAB
HGB BLD-MCNC: 9.6 G/DL (ref 11.7–15.7)
PTH-INTACT SERPL-MCNC: 127 PG/ML (ref 15–65)

## 2022-04-19 PROCEDURE — 99203 OFFICE O/P NEW LOW 30 MIN: CPT | Performed by: COLON & RECTAL SURGERY

## 2022-04-19 PROCEDURE — 36415 COLL VENOUS BLD VENIPUNCTURE: CPT | Performed by: PATHOLOGY

## 2022-04-19 PROCEDURE — 83970 ASSAY OF PARATHORMONE: CPT | Performed by: PATHOLOGY

## 2022-04-19 PROCEDURE — 85018 HEMOGLOBIN: CPT | Performed by: PATHOLOGY

## 2022-04-19 ASSESSMENT — PAIN SCALES - GENERAL: PAINLEVEL: NO PAIN (0)

## 2022-04-19 NOTE — LETTER
2022     RE: Betty Tee  [address]  [facility] [address]     Dear Colleague,    Thank you for referring your patient, Betty Tee, to the [facility] [place] at [facility]. Please see a copy of my visit note below.    Colon and Rectal Surgery Clinic Note    RE: Betty Tee  : 1940  RAVI: 2022    Sister Betty is an 81 year old woman who presents today for rectal bleeding and anemia.  She is seen today with her friend Nghia.    HPI:    Sigmoid resection with total abdominal hysterectomy and bilateral salpingo-oophorectomy on 2011 with Dr. Johanna Siddiqi for colo-uterine fistula  FINAL DIAGNOSIS:   A-C.  Sigmoid colon (specimens B and C), uterus and bilateral fallopian   tubes and ovaries; segmental colon resections, hysterctomy and bilateral   salpingo-oophorectomy:        - Colonic diverticulosis complicated by diverticulitis and fistula   formation between colon and left          adnexal structures, associated with marked inflammation and   liquefactive necrosis of portions          of left ovary and fallopian tube.        - Additional findings:             - Uterus with endometrial cystic atrophy and two myometrial   leiomyomas (0.8 cm and               5.1 cm in greatest dimension, respectively).             - Cervix with no diagnostic alteration.        - Multiple benign pericolonic lymph nodes.        - Negative for malignancy.     Diverting ileostomy was taken down 2012    Dr. Siddiqi saw her last in 2016 did not feel as though the patient should have a colonoscopy given significant medical history and she did not feel the patient would tolerate further surgery were a colon cancer to be found. Last colonoscopy was in .    PMH: DM II, Sjogren's syndrome, inflammatory arthritis and chronic pain, CHF with preserved EF and follows with Dr. Rosa, interstitial lung  "disease with moderate restriction and follows with Dr. Tillman, chronic anticoagulation with Xarelto for atrial fibrillation    Interval History: Sister Betty as noted to have anemia and a 10 pound weight loss in 5 months in February of this year. She was started on iron and B12 and hgb has improved. She has never noticed rectal bleeding. Her stools are currently black since being on the iron but she does not remember them being black before the iron supplement. She had a positive FIT test on 3/11/22. No change in bowel habits or stool caliber. Her appetite is good. No nausea or vomiting. She gets intermittent stabbing RLQ abdominal pain but this does not last long and resolves on its own.     Physical examination:  Examination was chaperoned by Jauna Reid NP .     Vitals: /54 (BP Location: Left arm, Patient Position: Sitting, Cuff Size: Adult Regular)   Pulse 65   Ht 5' 6\"   Wt 192 lb 11.2 oz   SpO2 95%   BMI 31.10 kg/m    BMI= Body mass index is 31.1 kg/m .    Alert, oriented, in no acute distress    Laboratory data:    Recent Labs   Lab Test 03/23/22  1221 03/04/22  1305 02/23/22  1229 02/11/22  1552 01/05/21  1508 12/04/20  1435 03/04/20  0335 03/03/20  1940   WBC 10.2 9.0   < > 11.5*   < > 10.3   < > 13.5*   HGB 11.3* 9.6*   < > 9.1*   < > 13.2   < > 13.8    250   < > 283   < > 189   < > 238   CR  --  0.99  --  1.00   < > 1.49*   < > 0.94   ALBUMIN  --   --   --  3.3*   < > 3.1*   < > 3.2*   BILITOTAL  --   --   --   --   --  0.6  --  1.1   ALKPHOS  --   --   --   --   --  95  --  82   ALT  --   --   --  17   < > 20   < > 21   AST  --   --   --  15   < > 18   < > 13   INR  --   --   --   --   --   --   --  1.36*    < > = values in this interval not displayed.       Assessment/plan: I had a long discussion with Sister Betty and her friend, Nghia, regarding her anemia and positive FIT test. Discussed the possibility of a colon cancer and if this were the case, would it be safe and " would she want an operation for it. Would like her to discuss this with Dr. Rosa and Dr. Tillman to help make this decision. Could also consider colonoscopy just to get more information on the source of her anemia. If she does decide to proceed with a scope, would also get an EGD at the same time. Will defer management of her iron supplement to her PCP and will recheck hemoglobin today. Patient's questions were answered to her stated satisfaction and she is in agreement with this plan.    For details of past medical history, surgical history, family history, medications, allergies, and review of systems, please see details below.    Medical history:  Past Medical History:   Diagnosis Date     Alcohol abuse, in remission      Allergic rhinitis, cause unspecified     allegra helps when she takes it     Antiplatelet or antithrombotic long-term use      Atrial fibrillation (H)     in hosp in 11/11 after surgery w/ fluid overload     Cardiomegaly     LVH on stress echo- cardiac w/u at [facility] ER- neg CT scan for PE, neg stress echo in 8/06     Chest pain, unspecified      Congestive heart failure (H)      Depressive disorder      Diabetes (H)      Disorder of bone and cartilage, unspecified     osteopenia (had been on prempro), improved on 6/06 dexa, stable dexa 11/10     Diverticulosis of colon (without mention of hemorrhage)     last episode yrs ago     Essential hypertension, benign      Follicular bronchiolitis (H)     associated with Sjogrens, dx by chest CT showing mosaic attenuation and air trapping     Gastro-oesophageal reflux disease      ILD (interstitial lung disease) (H)     associated with Sjogrens, also has mildly elevated IgG4, first noted on chest CT 2015 (mild changes) and also has small airways disease; ILD improved on follow up chest CT 2018.     Insomnia, unspecified     weaned off clonazepam     Irregular heart beat      Lumbago 7/09    MRI with NEAL, now seeing Dr. Cain for sciatic sx's     Major  depressive disorder, recurrent episode, moderate (H)      Obstructive sleep apnea     uses cpap     Osteoarthrosis, unspecified whether generalized or localized, unspecified site      Sjogren's syndrome (H)     + RG and SSA and lip bx     Sleep apnea      Tobacco use disorder     chantix in 9/07, started again in 6/08, working       Surgical history:  Past Surgical History:   Procedure Laterality Date     APPENDECTOMY       BACK SURGERY  1962     BACK SURGERY  1962     BIOPSY BREAST  09/27/2002    Biopsy Left Breast     BIOPSY BREAST Left 09/27/2002     BREAST SURGERY       CARDIAC SURGERY       CARDIAC SURGERY       CHOLECYSTECTOMY  1990's?     CHOLECYSTECTOMY       COLECTOMY LEFT  11/07/2011    Procedure:COLECTOMY LEFT; Laparoscopic mobilization of splenic flexture, sigmoid colectomy, coloprotoscopy, loop illeostomy; Surgeon:CK CASTANEDA; Location:[facility] OR     COLECTOMY LEFT  11/07/2011     COLONOSCOPY  1990,s     ENT SURGERY       EYE SURGERY  2012     FLEXIBLE SIGMOIDOSCOPY  11/03/2011     HYSTERECTOMY TOTAL ABDOMINAL, BILATERAL SALPINGO-OOPHORECTOMY, COMBINED  11/07/2011    Procedure:COMBINED HYSTERECTOMY TOTAL ABDOMINAL, BILATERAL SALPINGO-OOPHORECTOMY; total abdominal hysterectomy, bilateral salpingo-oophorectomy; Surgeon:ALETA MANUEL; Location:[facility] OR     HYSTERECTOMY TOTAL ABDOMINAL, BILATERAL SALPINGO-OOPHORECTOMY, COMBINED  11/07/2011     ILEOSTOMY  02/01/2012    takedown loop ileostomy      INSERT STENT URETER  11/07/2011    Procedure:INSERT STENT URETER; Placement of Bilateral Ureteral Stents ; Surgeon:PRANEETH BRYANT; Location:[facility] OR     SIGMOIDECTOMY  02/01/2012    Dr. Castaneda, Sigmoidectomy for diverticular abscess, iliostomy afterwards until repair     SIGMOIDOSCOPY FLEXIBLE  11/03/2011    Procedure:SIGMOIDOSCOPY FLEXIBLE; Flexible Sigmoidoscopy; Surgeon:CK CASTANEDA; Location:[facility] OR     TAKEDOWN ILEOSTOMY  02/01/2012    Procedure:TAKEDOWN ILEOSTOMY; Takedown Loop Ileostomy ; Surgeon:LEONEL  CK WALSH; Location:[facility] OR     URETERAL STENT PLACEMENT  11/07/2011     Z APPENDECTOMY  1970's?     Z NONSPECIFIC PROCEDURE  11/2005    exploratory abd lap, adhesions, resolved RLQ pain, diverticulitis episodes       Family history:  Family History   Problem Relation Age of Onset     C.A.D. Mother 63        MI- first at age 63     Heart Disease Mother      Hypertension Mother      Cerebrovascular Disease Mother      Hyperlipidemia Mother      Coronary Artery Disease Mother         MI-first at age 63     Other - See Comments Mother         cerebrovascular disease      Alcohol/Drug Father      Alzheimer Disease Father      Dementia Father      Hypertension Father      Hyperlipidemia Father      Substance Abuse Father      Diabetes Sister      Hypertension Sister      Hyperlipidemia Sister      Substance Abuse Sister      Asthma Sister      C.A.D. Sister 52        Minor MI- age 50's     Heart Disease Sister      Hypertension Sister      Hypertension Sister      Hypertension Brother      Cancer - colorectal Sister 48        Late 40's early 50's     Prostate Cancer Brother 74        Dx'd age 74     Gastrointestinal Disease Sister         Diverticulitis     Gastrointestinal Disease Brother         Diverticulitis     Lipids Sister      Lipids Sister      Parkinsonism Brother      Diabetes Sister      Heart Disease Sister         CHF     Cancer Sister         lung, smoker     Substance Abuse Sister      Substance Abuse Brother      Asthma Sister      Cancer Sister      Breast Cancer Daughter      Prostate Cancer Brother      Hyperlipidemia Brother      Diabetes Other      Hypertension Other      Coronary Artery Disease Sister         minor MI-age 50's     Heart Disease Sister      Hypertension Sister      Hypertension Brother      Hypertension Sister      Colon Cancer Sister      Prostate Cancer Brother      Diverticulitis Sister      Diverticulitis Brother      Parkinsonism Brother      Lung Cancer Sister      Breast  Cancer Daughter      Heart Disease Sister         CHF     Diabetes Sister      Asthma Sister        Medications:  Current Outpatient Medications   Medication Sig Dispense Refill     ACCU-CHEK SHANNON PLUS test strip USE TO TEST BLOOD SUGARS 3 TIMES DAILY 300 strip 5     acetaminophen (TYLENOL) 500 MG tablet Take 1,000 mg by mouth every 8 hours as needed (max 6 tablets/24 hours, 2 tablets/dose)        acyclovir (ZOVIRAX) 400 MG tablet Take 1 tablet (400 mg) by mouth 3 times daily For a couple days 15 tablet 2     acyclovir (ZOVIRAX) 5 % external ointment Apply topically 6 times daily As needed for outbreaks 15 g 3     albuterol (PROAIR HFA, PROVENTIL HFA, VENTOLIN HFA) 108 (90 BASE) MCG/ACT inhaler Inhale 2 puffs into the lungs every 6 hours 1 Inhaler 3     alendronate (FOSAMAX) 70 MG tablet Take 1 tablet (70 mg) by mouth every 7 days 12 tablet 0     anakinra (KINERET) 100 MG/0.67ML SOSY injection 100 mg every day x 3 days as needed for flare ups 6.7 mL 3     artificial tears OINT ophthalmic ointment Place 1 g into both eyes At Bedtime 7 g 11     atorvastatin (LIPITOR) 10 MG tablet TAKE 1/2 TABLET BY MOUTH EVERY DAY 45 tablet 2     azithromycin (ZITHROMAX) 250 MG tablet Take 1 tablet (250 mg) by mouth daily 30 tablet 11     BD PEN NEEDLE JANE 2ND GEN 32G X 4 MM miscellaneous USE 4 DAILY AS DIRECTED. 400 each 1     blood glucose (NO BRAND SPECIFIED) lancets standard Use to test blood sugar 3 times daily or as directed 100 lancet 3     cevimeline (EVOXAC) 30 MG capsule Take 1 capsule (30 mg) by mouth 3 times daily 270 capsule 2     Cholecalciferol (VITAMIN D3) 50 MCG (2000 UT) CAPS Take 1 tablet (50 mcg) by mouth daily - Oral 90 capsule 2     cyanocobalamin (VITAMIN B-12) 1000 MCG tablet Take 1,000 mcg by mouth daily       cyanocobalamin (VITAMIN B-12) 1000 MCG tablet Take 1 tablet (1,000 mcg) by mouth daily 30 tablet 1     escitalopram (LEXAPRO) 20 MG tablet TAKE 1 TABLET BY MOUTH EVERY DAY 90 tablet 0     ferrous  sulfate (FEROSUL) 325 (65 Fe) MG tablet TAKE 1 TABLET BY MOUTH EVERY DAY WITH BREAKFAST 30 tablet 1     fluticasone (FLONASE) 50 MCG/ACT nasal spray Spray 1-2 sprays into both nostrils daily as needed for allergies 18.2 mL 11     fluticasone-vilanterol (BREO ELLIPTA) 100-25 MCG/INH inhaler Inhale 1 puff into the lungs daily 1 Inhaler 11     HYDROcodone-acetaminophen (NORCO) 7.5-325 MG per tablet Take 1 tablet by mouth every 12 hours OK to fill and start 03/31/22 60 tablet 0     hydroxychloroquine (PLAQUENIL) 200 MG tablet Take 2 tablets (400 mg) by mouth daily Get annual eye exams for hydroxychloroquine (Plaquenil) monitoring and fax to [phone] 180 tablet 0     insulin aspart (NOVOLOG FLEXPEN) 100 UNIT/ML pen Inject 11 Units Subcutaneous 3 times daily (with meals) 15 mL 2     insulin glargine (BASAGLAR KWIKPEN) 100 UNIT/ML pen Sig: INJECT 28 UNITS SUBCUTANEOUS DAILY (down from 32 units in 3/22) 15 mL 0     ketoconazole (NIZORAL) 2 % external cream Apply topically 2 times daily as needed for itching 60 g 1     KLOR-CON 20 MEQ CR tablet TAKE 2 TABLETS (40 MEQ) BY MOUTH EVERY MORNING AND 1 TABLET (20 MEQ) EVERY EVENING. 270 tablet 3     lidocaine (LIDODERM) 5 % patch APPLY PATCH TO PAINFUL AREA FOR UP TO 12 H WITHIN A 24 H PERIOD. REMOVE AFTER 12 HOURS. 30 patch 2     loratadine (CLARITIN) 10 MG tablet TAKE 1 TABLET BY MOUTH EVERY DAY 90 tablet 3     methocarbamol (ROBAXIN) 750 MG tablet Take 1 tablet (750 mg) by mouth 3 times daily as needed for muscle spasms 90 tablet 3     metoprolol succinate ER (TOPROL-XL) 50 MG 24 hr tablet Take 1 tablet (50 mg) by mouth 2 times daily 90 tablet 3     montelukast (SINGULAIR) 10 MG tablet TAKE 1 TABLET BY MOUTH EVERYDAY AT BEDTIME 90 tablet 0     pantoprazole (PROTONIX) 40 MG EC tablet Take 1 tablet (40 mg) by mouth daily (replaces famotidine- should stop taking famotidine) 90 tablet 1     predniSONE (DELTASONE) 5 MG tablet Take 1 tablet (5 mg) by mouth daily 30 tablet 0      rivaroxaban ANTICOAGULANT (XARELTO ANTICOAGULANT) 20 MG TABS tablet TAKE 1 TABLET BY MOUTH DAILY WITH DINNER 90 tablet 3     Semaglutide, 1 MG/DOSE, (OZEMPIC, 1 MG/DOSE,) 4 MG/3ML SOPN Inject 1 mg Subcutaneous once a week 9 mL 1     spironolactone (ALDACTONE) 25 MG tablet Take 2 tablets (50 mg) by mouth daily 180 tablet 3     torsemide (DEMADEX) 10 MG tablet TAKE ONE TAB (10 MG) WITH ONE 20 MG TAB TO = 30 MG DAILY IN AFTERNOON. 90 tablet 3     torsemide (DEMADEX) 20 MG tablet TAKE 2 TABLETS (40 MG) BY MOUTH EVERY MORNING AND 1 TABLET (20 MG) DAILY AT 2 PM. TAKE THE AFTERNOON DOSE WITH AN EXTRA 10 MG TAB FOR A TOTAL OF 30 MG IN THE AFTERNOON 270 tablet 3     traZODone (DESYREL) 50 MG tablet Take 0.5-1.5 tablets (25-75 mg) by mouth nightly as needed for sleep 135 tablet 1     VITAMIN D3 50 MCG (2000 UT) tablet TAKE 1 TABLET (50 MCG) BY MOUTH DAILY - ORAL       COMPOUNDED NON-CONTROLLED SUBSTANCE (CMPD RX) - PHARMACY TO MIX COMPOUNDED MEDICATION Estriol 1 mg/g Apply small amount to finger and apply to inside vagina daily for 2 weeks then twice weekly Route: vaginally Dispense 30 grams 11 refills (Patient not taking: No sig reported) 30 g 11       Allergies:  The patientis allergic to amoxicillin-pot clavulanate, augmentin, codeine, codeine, penicillins, phenobarbital, phenobarbital, and seasonal allergies.    Social history:  Social History     Tobacco Use     Smoking status: Former Smoker     Packs/day: 0.50     Years: 10.00     Pack years: 5.00     Types: Cigarettes     Quit date: 8/1/2011     Years since quitting: 10.7     Smokeless tobacco: Never Used     Tobacco comment: 1/2 ppd   Substance Use Topics     Alcohol use: No     Alcohol/week: 0.0 standard drinks     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     Marital status: single.    Review of Systems:  Nursing Notes:   Cassia Woods  4/19/2022  3:24 PM  Signed  Chief Complaint   Patient presents with     Rectal Bleeding     Anemia and rectal bleeding   "      Vitals:    04/19/22 1520   BP: 128/54   BP Location: Left arm   Patient Position: Sitting   Cuff Size: Adult Regular   Pulse: 65   SpO2: 95%   Weight: 192 lb 11.2 oz   Height: 5' 6\"       Body mass index is 31.1 kg/m .    Cassia Woods CMA         20 minutes spent on the date of encounter (excluding time performing procedures with or without biopsy) performing chart review, history and exam, documentation and further activities as noted above.    Kelechi Santana MD   Professor and Chief  Division of Colon and Rectal Surgery  [facility]      Referring Provider:  Ilene Tristan MD  [address] [employer] [address] [address]     Primary Care Provider:  Ilene Tristan    This note was created using speech recognition software and may contain unintended word substitutions.    "

## 2022-04-19 NOTE — PATIENT INSTRUCTIONS
Please let us know if you would like to move forward with your colonoscopy/EGD   Lab today   Follow up with  if you wish     Yvonne GOMEZ [phone]

## 2022-04-19 NOTE — NURSING NOTE
"Chief Complaint   Patient presents with     Rectal Bleeding     Anemia and rectal bleeding        Vitals:    04/19/22 1520   BP: 128/54   BP Location: Left arm   Patient Position: Sitting   Cuff Size: Adult Regular   Pulse: 65   SpO2: 95%   Weight: 192 lb 11.2 oz   Height: 5' 6\"       Body mass index is 31.1 kg/m .    Cassia Woods CMA    "

## 2022-04-20 DIAGNOSIS — D64.9 ANEMIA, UNSPECIFIED TYPE: Primary | ICD-10-CM

## 2022-04-20 DIAGNOSIS — R19.5 FECAL OCCULT BLOOD TEST POSITIVE: ICD-10-CM

## 2022-04-21 ENCOUNTER — TELEPHONE (OUTPATIENT)
Dept: GASTROENTEROLOGY | Facility: CLINIC | Age: 82
End: 2022-04-21
Payer: MEDICARE

## 2022-04-21 DIAGNOSIS — E21.2 OTHER HYPERPARATHYROIDISM (H): ICD-10-CM

## 2022-04-21 DIAGNOSIS — R79.89 ELEVATED PARATHYROID HORMONE: Primary | ICD-10-CM

## 2022-04-21 NOTE — RESULT ENCOUNTER NOTE
Hello Sister Betty,    The parathyroid hormone lab was drawn as part of another lab visit, and it came back high  It looks like I am able to add on a Vit D (which may be part of the cause of the elevated PTH), but I am not able to add on a calcium which is good to get at the same time as the PTH (when it's abnormal).  I added on a calcium to get at one of your future labs now, and we can discuss all of this more at your upcoming visit on 5/3/22 as well.    Best,   Lev Tristan MD

## 2022-04-22 ENCOUNTER — OFFICE VISIT (OUTPATIENT)
Dept: PULMONOLOGY | Facility: CLINIC | Age: 82
End: 2022-04-22
Attending: INTERNAL MEDICINE
Payer: MEDICARE

## 2022-04-22 ENCOUNTER — ANCILLARY PROCEDURE (OUTPATIENT)
Dept: GENERAL RADIOLOGY | Facility: CLINIC | Age: 82
End: 2022-04-22
Attending: INTERNAL MEDICINE
Payer: MEDICARE

## 2022-04-22 ENCOUNTER — LAB (OUTPATIENT)
Dept: LAB | Facility: CLINIC | Age: 82
End: 2022-04-22
Payer: MEDICARE

## 2022-04-22 VITALS
WEIGHT: 190 LBS | HEIGHT: 66 IN | HEART RATE: 67 BPM | DIASTOLIC BLOOD PRESSURE: 81 MMHG | OXYGEN SATURATION: 99 % | BODY MASS INDEX: 30.53 KG/M2 | SYSTOLIC BLOOD PRESSURE: 168 MMHG

## 2022-04-22 DIAGNOSIS — I50.31 ACUTE DIASTOLIC (CONGESTIVE) HEART FAILURE (H): ICD-10-CM

## 2022-04-22 DIAGNOSIS — J44.89 FOLLICULAR BRONCHIOLITIS (H): ICD-10-CM

## 2022-04-22 DIAGNOSIS — E21.2 OTHER HYPERPARATHYROIDISM (H): ICD-10-CM

## 2022-04-22 DIAGNOSIS — J44.89 FOLLICULAR BRONCHIOLITIS (H): Primary | ICD-10-CM

## 2022-04-22 DIAGNOSIS — R79.89 ELEVATED PARATHYROID HORMONE: ICD-10-CM

## 2022-04-22 LAB
ANION GAP SERPL CALCULATED.3IONS-SCNC: 10 MMOL/L (ref 3–14)
BUN SERPL-MCNC: 19 MG/DL (ref 7–30)
CALCIUM SERPL-MCNC: 8.9 MG/DL (ref 8.5–10.1)
CHLORIDE BLD-SCNC: 106 MMOL/L (ref 94–109)
CO2 SERPL-SCNC: 26 MMOL/L (ref 20–32)
CREAT SERPL-MCNC: 1.01 MG/DL (ref 0.52–1.04)
DEPRECATED CALCIDIOL+CALCIFEROL SERPL-MC: 38 UG/L (ref 20–75)
GFR SERPL CREATININE-BSD FRML MDRD: 56 ML/MIN/1.73M2
GLUCOSE BLD-MCNC: 209 MG/DL (ref 70–99)
NT-PROBNP SERPL-MCNC: 229 PG/ML (ref 0–450)
POTASSIUM BLD-SCNC: 3.4 MMOL/L (ref 3.4–5.3)
SODIUM SERPL-SCNC: 142 MMOL/L (ref 133–144)

## 2022-04-22 PROCEDURE — 71046 X-RAY EXAM CHEST 2 VIEWS: CPT | Mod: GC | Performed by: RADIOLOGY

## 2022-04-22 PROCEDURE — 94726 PLETHYSMOGRAPHY LUNG VOLUMES: CPT | Performed by: INTERNAL MEDICINE

## 2022-04-22 PROCEDURE — 82306 VITAMIN D 25 HYDROXY: CPT | Performed by: INTERNAL MEDICINE

## 2022-04-22 PROCEDURE — 83880 ASSAY OF NATRIURETIC PEPTIDE: CPT | Performed by: INTERNAL MEDICINE

## 2022-04-22 PROCEDURE — 99214 OFFICE O/P EST MOD 30 MIN: CPT | Mod: 25 | Performed by: INTERNAL MEDICINE

## 2022-04-22 PROCEDURE — 99000 SPECIMEN HANDLING OFFICE-LAB: CPT | Performed by: PATHOLOGY

## 2022-04-22 PROCEDURE — G0463 HOSPITAL OUTPT CLINIC VISIT: HCPCS | Mod: 25

## 2022-04-22 PROCEDURE — 94375 RESPIRATORY FLOW VOLUME LOOP: CPT | Performed by: INTERNAL MEDICINE

## 2022-04-22 PROCEDURE — 36415 COLL VENOUS BLD VENIPUNCTURE: CPT | Performed by: PATHOLOGY

## 2022-04-22 PROCEDURE — 80048 BASIC METABOLIC PNL TOTAL CA: CPT | Performed by: PATHOLOGY

## 2022-04-22 PROCEDURE — 94729 DIFFUSING CAPACITY: CPT | Performed by: INTERNAL MEDICINE

## 2022-04-22 RX ORDER — PREDNISONE 20 MG/1
TABLET ORAL
Qty: 7 TABLET | Refills: 0 | Status: SHIPPED | OUTPATIENT
Start: 2022-04-22 | End: 2022-05-28

## 2022-04-22 ASSESSMENT — PAIN SCALES - GENERAL: PAINLEVEL: NO PAIN (0)

## 2022-04-22 NOTE — LETTER
4/22/2022         RE: Betty Tee  [address]  [facility] [address]        Dear Colleague,    Thank you for referring your patient, Betty Tee, to the [facility] [place]. Please see a copy of my visit note below.    [facility]    Reason for Visit  Betty Tee is a 81 year old year old female who is being seen for Interstitial Lung Disease (ILD) (6 month follow up )    HPI  Sister Betty is an 81-year-old who has Sjogren's follicular bronchiolitis and history of ILD who is here for follow-up.  She had mild ILD on a previous chest CT scan in 2015, but follow-up chest CT scan in 2018 showed improvement with no obvious ILD present on the CT scan.  The follicular bronchiolitis changes were still present.  She has been on prednisone for her Sjogren's for approximately 5 years, and that is managed by Dr. Lopez in rheumatology. Her medications for follicular bronchiolitis include current prednisone dose is 5 mg daily, azithromycin 250 mg daily, montelukast daily, and Breo Ellipta inhaler 100-25. These are all anti-inflammatories for her bronchiolitis.    Today, she reports worsening of shortness of breath associated with productive cough for last 1-2 weeks. She is producing whitish sputum. She does not have fever or sore throat. She is noted to have anemia, positive FIT test and weight loss in 5 month period this year. Colorectal surgery considering colonoscopy and EGD for further evaluation of anemia.    She does not exercise, and she uses a walker. Her activity is limited by hip pain. She does wear CPAP for sleep apnea at nighttime. She denies fevers or new skin rashes. She reports that she sometimes forgets to take her Breo Ellipta inhaler.    She did receive the flu vaccine and the third shot of the Pfizer COVID-19 vaccine.          Current Outpatient Medications   Medication      ACCU-CHEK SHANNON PLUS test strip     acetaminophen (TYLENOL) 500 MG tablet     acyclovir (ZOVIRAX) 400 MG tablet     acyclovir (ZOVIRAX) 5 % external ointment     albuterol (PROAIR HFA, PROVENTIL HFA, VENTOLIN HFA) 108 (90 BASE) MCG/ACT inhaler     alendronate (FOSAMAX) 70 MG tablet     anakinra (KINERET) 100 MG/0.67ML SOSY injection     artificial tears OINT ophthalmic ointment     atorvastatin (LIPITOR) 10 MG tablet     azithromycin (ZITHROMAX) 250 MG tablet     BD PEN NEEDLE JANE 2ND GEN 32G X 4 MM miscellaneous     blood glucose (NO BRAND SPECIFIED) lancets standard     cevimeline (EVOXAC) 30 MG capsule     Cholecalciferol (VITAMIN D3) 50 MCG (2000 UT) CAPS     COMPOUNDED NON-CONTROLLED SUBSTANCE (CMPD RX) - PHARMACY TO MIX COMPOUNDED MEDICATION     cyanocobalamin (VITAMIN B-12) 1000 MCG tablet     cyanocobalamin (VITAMIN B-12) 1000 MCG tablet     escitalopram (LEXAPRO) 20 MG tablet     ferrous sulfate (FEROSUL) 325 (65 Fe) MG tablet     fluticasone (FLONASE) 50 MCG/ACT nasal spray     fluticasone-vilanterol (BREO ELLIPTA) 100-25 MCG/INH inhaler     HYDROcodone-acetaminophen (NORCO) 7.5-325 MG per tablet     hydroxychloroquine (PLAQUENIL) 200 MG tablet     insulin aspart (NOVOLOG FLEXPEN) 100 UNIT/ML pen     insulin glargine (BASAGLAR KWIKPEN) 100 UNIT/ML pen     ketoconazole (NIZORAL) 2 % external cream     KLOR-CON 20 MEQ CR tablet     lidocaine (LIDODERM) 5 % patch     loratadine (CLARITIN) 10 MG tablet     methocarbamol (ROBAXIN) 750 MG tablet     metoprolol succinate ER (TOPROL-XL) 50 MG 24 hr tablet     montelukast (SINGULAIR) 10 MG tablet     pantoprazole (PROTONIX) 40 MG EC tablet     predniSONE (DELTASONE) 5 MG tablet     rivaroxaban ANTICOAGULANT (XARELTO ANTICOAGULANT) 20 MG TABS tablet     Semaglutide, 1 MG/DOSE, (OZEMPIC, 1 MG/DOSE,) 4 MG/3ML SOPN     spironolactone (ALDACTONE) 25 MG tablet     torsemide (DEMADEX) 10 MG tablet     torsemide (DEMADEX) 20 MG tablet     traZODone (DESYREL) 50 MG tablet      VITAMIN D3 50 MCG (2000 UT) tablet     No current facility-administered medications for this visit.     Allergies   Allergen Reactions     Amoxicillin-Pot Clavulanate      Augmentin Nausea and Vomiting     Codeine Nausea and Vomiting     Codeine      PN: LW Reaction: HIVES     Penicillins Nausea and Vomiting     PN: LW Reaction: GI Upset     Phenobarbital Itching     Phenobarbital      Seasonal Allergies      Past Medical History:   Diagnosis Date     Alcohol abuse, in remission      Allergic rhinitis, cause unspecified     allegra helps when she takes it     Antiplatelet or antithrombotic long-term use      Atrial fibrillation (H)     in hosp in 11/11 after surgery w/ fluid overload     Cardiomegaly     LVH on stress echo- cardiac w/u at [facility] ER- neg CT scan for PE, neg stress echo in 8/06     Chest pain, unspecified      Congestive heart failure (H)      Depressive disorder      Diabetes (H)      Disorder of bone and cartilage, unspecified     osteopenia (had been on prempro), improved on 6/06 dexa, stable dexa 11/10     Diverticulosis of colon (without mention of hemorrhage)     last episode yrs ago     Essential hypertension, benign      Follicular bronchiolitis (H)     associated with Sjogrens, dx by chest CT showing mosaic attenuation and air trapping     Gastro-oesophageal reflux disease      ILD (interstitial lung disease) (H)     associated with Sjogrens, also has mildly elevated IgG4, first noted on chest CT 2015 (mild changes) and also has small airways disease; ILD improved on follow up chest CT 2018.     Insomnia, unspecified     weaned off clonazepam     Irregular heart beat      Lumbago 7/09    MRI with DJD, now seeing Dr. Cain for sciatic sx's     Major depressive disorder, recurrent episode, moderate (H)      Obstructive sleep apnea     uses cpap     Osteoarthrosis, unspecified whether generalized or localized, unspecified site      Sjogren's syndrome (H)     + RG and SSA and lip bx     Sleep  apnea      Tobacco use disorder     chantix in 9/07, started again in 6/08, working       Past Surgical History:   Procedure Laterality Date     APPENDECTOMY       BACK SURGERY  1962     BACK SURGERY  1962     BIOPSY BREAST  09/27/2002    Biopsy Left Breast     BIOPSY BREAST Left 09/27/2002     BREAST SURGERY       CARDIAC SURGERY       CARDIAC SURGERY       CHOLECYSTECTOMY  1990's?     CHOLECYSTECTOMY       COLECTOMY LEFT  11/07/2011    Procedure:COLECTOMY LEFT; Laparoscopic mobilization of splenic flexture, sigmoid colectomy, coloprotoscopy, loop illeostomy; Surgeon:CK CASTANEDA; Location:[facility] OR     COLECTOMY LEFT  11/07/2011     COLONOSCOPY  1990,s     ENT SURGERY       EYE SURGERY  2012     FLEXIBLE SIGMOIDOSCOPY  11/03/2011     HYSTERECTOMY TOTAL ABDOMINAL, BILATERAL SALPINGO-OOPHORECTOMY, COMBINED  11/07/2011    Procedure:COMBINED HYSTERECTOMY TOTAL ABDOMINAL, BILATERAL SALPINGO-OOPHORECTOMY; total abdominal hysterectomy, bilateral salpingo-oophorectomy; Surgeon:ALETA MANUEL; Location:[facility]     HYSTERECTOMY TOTAL ABDOMINAL, BILATERAL SALPINGO-OOPHORECTOMY, COMBINED  11/07/2011     ILEOSTOMY  02/01/2012    takedown loop ileostomy      INSERT STENT URETER  11/07/2011    Procedure:INSERT STENT URETER; Placement of Bilateral Ureteral Stents ; Surgeon:PRANEETH BRYANT; Location:[facility] OR     SIGMOIDECTOMY  02/01/2012    Dr. Castaneda, Sigmoidectomy for diverticular abscess, iliostomy afterwards until repair     SIGMOIDOSCOPY FLEXIBLE  11/03/2011    Procedure:SIGMOIDOSCOPY FLEXIBLE; Flexible Sigmoidoscopy; Surgeon:CK CASTANEDA; Location:[facility]     TAKEDOWN ILEOSTOMY  02/01/2012    Procedure:TAKEDOWN ILEOSTOMY; Takedown Loop Ileostomy ; Surgeon:CK CASTANEDA; Location:[facility]     URETERAL STENT PLACEMENT  11/07/2011     ZZC APPENDECTOMY  1970's?     ZZC NONSPECIFIC PROCEDURE  11/2005    exploratory abd lap, adhesions, resolved RLQ pain, diverticulitis episodes       Social History     Socioeconomic History      Marital status: Single     Spouse name: Not on file     Number of children: 0     Years of education: Ed Spec De     Highest education level: Not on file   Occupational History     Occupation: Professor     Employer: [employer]     Comment: [employer]- Education   Tobacco Use     Smoking status: Former Smoker     Packs/day: 0.50     Years: 10.00     Pack years: 5.00     Types: Cigarettes     Quit date: 8/1/2011     Years since quitting: 10.7     Smokeless tobacco: Never Used     Tobacco comment: 1/2 ppd   Substance and Sexual Activity     Alcohol use: No     Alcohol/week: 0.0 standard drinks     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     Drug use: No     Sexual activity: Never   Other Topics Concern     Parent/sibling w/ CABG, MI or angioplasty before 65F 55M? Yes   Social History Narrative                 Social Determinants of Health     Financial Resource Strain: Not on file   Food Insecurity: Not on file   Transportation Needs: Not on file   Physical Activity: Not on file   Stress: Not on file   Social Connections: Not on file   Intimate Partner Violence: Not on file   Housing Stability: Not on file       Family History   Problem Relation Age of Onset     C.A.D. Mother 63        MI- first at age 63     Heart Disease Mother      Hypertension Mother      Cerebrovascular Disease Mother      Hyperlipidemia Mother      Coronary Artery Disease Mother         MI-first at age 63     Other - See Comments Mother         cerebrovascular disease      Alcohol/Drug Father      Alzheimer Disease Father      Dementia Father      Hypertension Father      Hyperlipidemia Father      Substance Abuse Father      Diabetes Sister      Hypertension Sister      Hyperlipidemia Sister      Substance Abuse Sister      Asthma Sister      C.A.D. Sister 52        Minor MI- age 50's     Heart Disease Sister      Hypertension Sister      Hypertension Sister      Hypertension Brother      Cancer - colorectal  "Sister 48        Late 40's early 50's     Prostate Cancer Brother 74        Dx'd age 74     Gastrointestinal Disease Sister         Diverticulitis     Gastrointestinal Disease Brother         Diverticulitis     Lipids Sister      Lipids Sister      Parkinsonism Brother      Diabetes Sister      Heart Disease Sister         CHF     Cancer Sister         lung, smoker     Substance Abuse Sister      Substance Abuse Brother      Asthma Sister      Cancer Sister      Breast Cancer Daughter      Prostate Cancer Brother      Hyperlipidemia Brother      Diabetes Other      Hypertension Other      Coronary Artery Disease Sister         minor MI-age 50's     Heart Disease Sister      Hypertension Sister      Hypertension Brother      Hypertension Sister      Colon Cancer Sister      Prostate Cancer Brother      Diverticulitis Sister      Diverticulitis Brother      Parkinsonism Brother      Lung Cancer Sister      Breast Cancer Daughter      Heart Disease Sister         CHF     Diabetes Sister      Asthma Sister              Vitals: BP (!) 168/81 (BP Location: Right arm, Patient Position: Chair, Cuff Size: Adult Regular)   Pulse 67   Ht 1.664 m (5' 5.5\")   Wt 86.2 kg (190 lb)   SpO2 99%   BMI 31.14 kg/m      Exam:   GENERAL APPEARANCE: Well developed, well nourished, alert, and in no apparent distress.  RESP: good air flow throughout.  No crackles. No rhonchi. No wheezes. scattered inspiratory squeaks +.  CV: Normal S1, S2, regular rhythm, normal rate. No murmur.  No LE edema.   MS: extremities normal. No clubbing. No cyanosis.  SKIN: no rash on limited exam.  NEURO: Mentation intact, speech normal, normal stance. Uses a walker to walk.  PSYCH: mentation appears normal. and affect normal/bright.    Results:  Recent Results (from the past 168 hour(s))   Hemoglobin    Collection Time: 04/19/22  5:12 PM   Result Value Ref Range    Hemoglobin 9.6 (L) 11.7 - 15.7 g/dL   Parathyroid Hormone Intact    Collection Time: 04/19/22  " 5:12 PM   Result Value Ref Range    Parathyroid Hormone Intact 127 (H) 15 - 65 pg/mL   General PFT Lab (Please always keep checked)    Collection Time: 04/22/22 10:16 AM   Result Value Ref Range    FVC-Pred 2.65 L    FVC-Pre 1.89 L    FVC-%Pred-Pre 71 %    FEV1-Pre 1.42 L    FEV1-%Pred-Pre 70 %    FEV1FVC-Pred 77 %    FEV1FVC-Pre 75 %    FEFMax-Pred 4.93 L/sec    FEFMax-Pre 4.97 L/sec    FEFMax-%Pred-Pre 100 %    FEF2575-Pred 1.58 L/sec    FEF2575-Pre 1.00 L/sec    YLL7927-%Pred-Pre 63 %    ExpTime-Pre 7.60 sec    FIFMax-Pre 3.24 L/sec    VC-Pred 3.02 L    VC-Pre 2.06 L    VC-%Pred-Pre 68 %    IC-Pred 2.64 L    IC-Pre 1.80 L    IC-%Pred-Pre 68 %    ERV-Pred 0.38 L    ERV-Pre 0.26 L    ERV-%Pred-Pre 67 %    FEV1FEV6-Pred 77 %    FEV1FEV6-Pre 76 %    FRCPleth-Pred 2.81 L    FRCPleth-Pre 2.43 L    FRCPleth-%Pred-Pre 86 %    RVPleth-Pred 2.31 L    RVPleth-Pre 2.18 L    RVPleth-%Pred-Pre 94 %    TLCPleth-Pred 5.19 L    TLCPleth-Pre 4.24 L    TLCPleth-%Pred-Pre 81 %    DLCOunc-Pred 19.70 ml/min/mmHg    DLCOunc-Pre 13.73 ml/min/mmHg    DLCOunc-%Pred-Pre 69 %    DLCOcor-Pre 15.96 ml/min/mmHg    DLCOcor-%Pred-Pre 81 %    VA-Pre 3.35 L    VA-%Pred-Pre 68 %    FEV1SVC-Pred 66 %    FEV1SVC-Pre 69 %       I reviewed pulmonary function test that was performed today. This shows normal spirometry, lung volumes, and diffusing capacity. Lung function is stable and overall improved compared to 2018 and 2019.    I reviewed results with the patient.      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    1. Follicular bronchiolitis with Sjogren's. Last CT scan showed improvement in her ILD but persistent changes of follicular bronchiolitis. She has done well with treatment of her follicular bronchiolitis. Repeat PFT is within normal limits. However, she has worsening shortness of breath associated with productive cough. She doesn't have any  fever or sore throat. She has scattered inspiratory squeaks on ausculation. Her symptoms could be related to acute bronchiolitis secondary to viral infection. We plan to get Xray chest and pro BNP to rule out other possibilities of worsening shortness.     We recommend to try prednisone 20 mg for 1 week then she can go back to regular dose of prednisone 5 mg daily.     We will continue  azithromycin 250 mg daily, montelukast 10 mg daily, and Breo Ellipta inhaler 100-25. She is tolerating the medications well. I will see her back in 6 months with full PFT.    2. Anemia: she has hemoglobin drop and positive FIT test. Colorectal surgery considering colonoscopy and EGD for further evaluation of anemia.     Patient discussed and staffed with MD Willie Doty,   Rheumatology fellow       I saw and evaluated patient with Fellow.  Case discussed - agree with note.  I reviewed pulmonary function test that was performed today.  This shows normal spirometry, lung volumes and corrected diffusing capacity.  PFT today is within the range of what its been in the past 4 years.  I also reviewed BNP today, and it is within normal limits.  I reviewed chest x-ray today, and my interpretation is there is no pneumonia or pulmonary edema.  Her increasing shortness of breath could be attributable to her new anemia.  However, that would not cause cough or mucus production.  She appears to be having an exacerbation of her follicular bronchiolitis, and I will treat with prednisone 20 mg daily for 1 week.    I spent 36 minutes reviewing chart, reviewing test results, talking with and examining patient, formulating plan, and documentation on the day of the encounter.      SAMAN ANDRADE M.D.

## 2022-04-22 NOTE — NURSING NOTE
Chief Complaint   Patient presents with     Interstitial Lung Disease (ILD)     6 month follow up      Vitals were taken and medications were reconciled.     Mary Alice STERNA  11:26 AM

## 2022-04-22 NOTE — PROGRESS NOTES
[facility] Interstitial Lung Disease Clinic    Reason for Visit  Betty Tee is a 81 year old year old female who is being seen for Interstitial Lung Disease (ILD) (6 month follow up )    HPI  Sister Betty is an 81-year-old who has Sjogren's follicular bronchiolitis and history of ILD who is here for follow-up.  She had mild ILD on a previous chest CT scan in 2015, but follow-up chest CT scan in 2018 showed improvement with no obvious ILD present on the CT scan.  The follicular bronchiolitis changes were still present.  She has been on prednisone for her Sjogren's for approximately 5 years, and that is managed by Dr. Lopez in rheumatology. Her medications for follicular bronchiolitis include current prednisone dose is 5 mg daily, azithromycin 250 mg daily, montelukast daily, and Breo Ellipta inhaler 100-25. These are all anti-inflammatories for her bronchiolitis.    Today, she reports worsening of shortness of breath associated with productive cough for last 1-2 weeks. She is producing whitish sputum. She does not have fever or sore throat. She is noted to have anemia, positive FIT test and weight loss in 5 month period this year. Colorectal surgery considering colonoscopy and EGD for further evaluation of anemia.    She does not exercise, and she uses a walker. Her activity is limited by hip pain. She does wear CPAP for sleep apnea at nighttime. She denies fevers or new skin rashes. She reports that she sometimes forgets to take her Breo Ellipta inhaler.    She did receive the flu vaccine and the third shot of the Pfizer COVID-19 vaccine.          Current Outpatient Medications   Medication     ACCU-CHEK SHANNON PLUS test strip     acetaminophen (TYLENOL) 500 MG tablet     acyclovir (ZOVIRAX) 400 MG tablet     acyclovir (ZOVIRAX) 5 % external ointment     albuterol (PROAIR HFA, PROVENTIL HFA, VENTOLIN HFA) 108 (90 BASE) MCG/ACT inhaler     alendronate (FOSAMAX) 70 MG tablet     anakinra  (KINERET) 100 MG/0.67ML SOSY injection     artificial tears OINT ophthalmic ointment     atorvastatin (LIPITOR) 10 MG tablet     azithromycin (ZITHROMAX) 250 MG tablet     BD PEN NEEDLE JANE 2ND GEN 32G X 4 MM miscellaneous     blood glucose (NO BRAND SPECIFIED) lancets standard     cevimeline (EVOXAC) 30 MG capsule     Cholecalciferol (VITAMIN D3) 50 MCG (2000 UT) CAPS     COMPOUNDED NON-CONTROLLED SUBSTANCE (CMPD RX) - PHARMACY TO MIX COMPOUNDED MEDICATION     cyanocobalamin (VITAMIN B-12) 1000 MCG tablet     cyanocobalamin (VITAMIN B-12) 1000 MCG tablet     escitalopram (LEXAPRO) 20 MG tablet     ferrous sulfate (FEROSUL) 325 (65 Fe) MG tablet     fluticasone (FLONASE) 50 MCG/ACT nasal spray     fluticasone-vilanterol (BREO ELLIPTA) 100-25 MCG/INH inhaler     HYDROcodone-acetaminophen (NORCO) 7.5-325 MG per tablet     hydroxychloroquine (PLAQUENIL) 200 MG tablet     insulin aspart (NOVOLOG FLEXPEN) 100 UNIT/ML pen     insulin glargine (BASAGLAR KWIKPEN) 100 UNIT/ML pen     ketoconazole (NIZORAL) 2 % external cream     KLOR-CON 20 MEQ CR tablet     lidocaine (LIDODERM) 5 % patch     loratadine (CLARITIN) 10 MG tablet     methocarbamol (ROBAXIN) 750 MG tablet     metoprolol succinate ER (TOPROL-XL) 50 MG 24 hr tablet     montelukast (SINGULAIR) 10 MG tablet     pantoprazole (PROTONIX) 40 MG EC tablet     predniSONE (DELTASONE) 5 MG tablet     rivaroxaban ANTICOAGULANT (XARELTO ANTICOAGULANT) 20 MG TABS tablet     Semaglutide, 1 MG/DOSE, (OZEMPIC, 1 MG/DOSE,) 4 MG/3ML SOPN     spironolactone (ALDACTONE) 25 MG tablet     torsemide (DEMADEX) 10 MG tablet     torsemide (DEMADEX) 20 MG tablet     traZODone (DESYREL) 50 MG tablet     VITAMIN D3 50 MCG (2000 UT) tablet     No current facility-administered medications for this visit.     Allergies   Allergen Reactions     Amoxicillin-Pot Clavulanate      Augmentin Nausea and Vomiting     Codeine Nausea and Vomiting     Codeine      PN: LW Reaction: HIVES     Penicillins  Nausea and Vomiting     PN: LW Reaction: GI Upset     Phenobarbital Itching     Phenobarbital      Seasonal Allergies      Past Medical History:   Diagnosis Date     Alcohol abuse, in remission      Allergic rhinitis, cause unspecified     allegra helps when she takes it     Antiplatelet or antithrombotic long-term use      Atrial fibrillation (H)     in hosp in 11/11 after surgery w/ fluid overload     Cardiomegaly     LVH on stress echo- cardiac w/u at [facility] ER- neg CT scan for PE, neg stress echo in 8/06     Chest pain, unspecified      Congestive heart failure (H)      Depressive disorder      Diabetes (H)      Disorder of bone and cartilage, unspecified     osteopenia (had been on prempro), improved on 6/06 dexa, stable dexa 11/10     Diverticulosis of colon (without mention of hemorrhage)     last episode yrs ago     Essential hypertension, benign      Follicular bronchiolitis (H)     associated with Sjogrens, dx by chest CT showing mosaic attenuation and air trapping     Gastro-oesophageal reflux disease      ILD (interstitial lung disease) (H)     associated with Sjogrens, also has mildly elevated IgG4, first noted on chest CT 2015 (mild changes) and also has small airways disease; ILD improved on follow up chest CT 2018.     Insomnia, unspecified     weaned off clonazepam     Irregular heart beat      Lumbago 7/09    MRI with [facility], now seeing Dr. Cain for sciatic sx's     Major depressive disorder, recurrent episode, moderate (H)      Obstructive sleep apnea     uses cpap     Osteoarthrosis, unspecified whether generalized or localized, unspecified site      Sjogren's syndrome (H)     + RG and SSA and lip bx     Sleep apnea      Tobacco use disorder     chantix in 9/07, started again in 6/08, working       Past Surgical History:   Procedure Laterality Date     APPENDECTOMY       BACK SURGERY  1962     BACK SURGERY  1962     BIOPSY BREAST  09/27/2002    Biopsy Left Breast     BIOPSY BREAST Left 09/27/2002      BREAST SURGERY       CARDIAC SURGERY       CARDIAC SURGERY       CHOLECYSTECTOMY  1990's?     CHOLECYSTECTOMY       COLECTOMY LEFT  11/07/2011    Procedure:COLECTOMY LEFT; Laparoscopic mobilization of splenic flexture, sigmoid colectomy, coloprotoscopy, loop illeostomy; Surgeon:CK CASTANEDA; Location:[facility] OR     COLECTOMY LEFT  11/07/2011     COLONOSCOPY  1990,s     ENT SURGERY       EYE SURGERY  2012     FLEXIBLE SIGMOIDOSCOPY  11/03/2011     HYSTERECTOMY TOTAL ABDOMINAL, BILATERAL SALPINGO-OOPHORECTOMY, COMBINED  11/07/2011    Procedure:COMBINED HYSTERECTOMY TOTAL ABDOMINAL, BILATERAL SALPINGO-OOPHORECTOMY; total abdominal hysterectomy, bilateral salpingo-oophorectomy; Surgeon:ALETA MANUEL; Location[facility] OR     HYSTERECTOMY TOTAL ABDOMINAL, BILATERAL SALPINGO-OOPHORECTOMY, COMBINED  11/07/2011     ILEOSTOMY  02/01/2012    takedown loop ileostomy      INSERT STENT URETER  11/07/2011    Procedure:INSERT STENT URETER; Placement of Bilateral Ureteral Stents ; Surgeon:PRANEETH BRYANT; Location:[facility] OR     SIGMOIDECTOMY  02/01/2012    Dr. Castaneda, Sigmoidectomy for diverticular abscess, iliostomy afterwards until repair     SIGMOIDOSCOPY FLEXIBLE  11/03/2011    Procedure:SIGMOIDOSCOPY FLEXIBLE; Flexible Sigmoidoscopy; Surgeon:CK CASTANEDA; Location:[facility] OR     TAKEDOWN ILEOSTOMY  02/01/2012    Procedure:TAKEDOWN ILEOSTOMY; Takedown Loop Ileostomy ; Surgeon:CK CASTANEDA; Location:[facility] OR     URETERAL STENT PLACEMENT  11/07/2011     ZZC APPENDECTOMY  1970's?     ZZC NONSPECIFIC PROCEDURE  11/2005    exploratory abd lap, adhesions, resolved RLQ pain, diverticulitis episodes       Social History     Socioeconomic History     Marital status: Single     Spouse name: Not on file     Number of children: 0     Years of education: Ed Spec De     Highest education level: Not on file   Occupational History     Occupation: Professor     Employer: [employer]     Comment: [employer]-  Education   Tobacco Use     Smoking status: Former Smoker     Packs/day: 0.50     Years: 10.00     Pack years: 5.00     Types: Cigarettes     Quit date: 8/1/2011     Years since quitting: 10.7     Smokeless tobacco: Never Used     Tobacco comment: 1/2 ppd   Substance and Sexual Activity     Alcohol use: No     Alcohol/week: 0.0 standard drinks     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     Drug use: No     Sexual activity: Never   Other Topics Concern     Parent/sibling w/ CABG, MI or angioplasty before 65F 55M? Yes   Social History Narrative                 Social Determinants of Health     Financial Resource Strain: Not on file   Food Insecurity: Not on file   Transportation Needs: Not on file   Physical Activity: Not on file   Stress: Not on file   Social Connections: Not on file   Intimate Partner Violence: Not on file   Housing Stability: Not on file       Family History   Problem Relation Age of Onset     C.A.D. Mother 63        MI- first at age 63     Heart Disease Mother      Hypertension Mother      Cerebrovascular Disease Mother      Hyperlipidemia Mother      Coronary Artery Disease Mother         MI-first at age 63     Other - See Comments Mother         cerebrovascular disease      Alcohol/Drug Father      Alzheimer Disease Father      Dementia Father      Hypertension Father      Hyperlipidemia Father      Substance Abuse Father      Diabetes Sister      Hypertension Sister      Hyperlipidemia Sister      Substance Abuse Sister      Asthma Sister      C.A.D. Sister 52        Minor MI- age 50's     Heart Disease Sister      Hypertension Sister      Hypertension Sister      Hypertension Brother      Cancer - colorectal Sister 48        Late 40's early 50's     Prostate Cancer Brother 74        Dx'd age 74     Gastrointestinal Disease Sister         Diverticulitis     Gastrointestinal Disease Brother         Diverticulitis     Lipids Sister      Lipids Sister      Parkinsonism Brother      Diabetes Sister  "     Heart Disease Sister         CHF     Cancer Sister         lung, smoker     Substance Abuse Sister      Substance Abuse Brother      Asthma Sister      Cancer Sister      Breast Cancer Daughter      Prostate Cancer Brother      Hyperlipidemia Brother      Diabetes Other      Hypertension Other      Coronary Artery Disease Sister         minor MI-age 50's     Heart Disease Sister      Hypertension Sister      Hypertension Brother      Hypertension Sister      Colon Cancer Sister      Prostate Cancer Brother      Diverticulitis Sister      Diverticulitis Brother      Parkinsonism Brother      Lung Cancer Sister      Breast Cancer Daughter      Heart Disease Sister         CHF     Diabetes Sister      Asthma Sister              Vitals: BP (!) 168/81 (BP Location: Right arm, Patient Position: Chair, Cuff Size: Adult Regular)   Pulse 67   Ht 1.664 m (5' 5.5\")   Wt 86.2 kg (190 lb)   SpO2 99%   BMI 31.14 kg/m      Exam:   GENERAL APPEARANCE: Well developed, well nourished, alert, and in no apparent distress.  RESP: good air flow throughout.  No crackles. No rhonchi. No wheezes. scattered inspiratory squeaks +.  CV: Normal S1, S2, regular rhythm, normal rate. No murmur.  No LE edema.   MS: extremities normal. No clubbing. No cyanosis.  SKIN: no rash on limited exam.  NEURO: Mentation intact, speech normal, normal stance. Uses a walker to walk.  PSYCH: mentation appears normal. and affect normal/bright.    Results:  Recent Results (from the past 168 hour(s))   Hemoglobin    Collection Time: 04/19/22  5:12 PM   Result Value Ref Range    Hemoglobin 9.6 (L) 11.7 - 15.7 g/dL   Parathyroid Hormone Intact    Collection Time: 04/19/22  5:12 PM   Result Value Ref Range    Parathyroid Hormone Intact 127 (H) 15 - 65 pg/mL   General PFT Lab (Please always keep checked)    Collection Time: 04/22/22 10:16 AM   Result Value Ref Range    FVC-Pred 2.65 L    FVC-Pre 1.89 L    FVC-%Pred-Pre 71 %    FEV1-Pre 1.42 L    FEV1-%Pred-Pre " 70 %    FEV1FVC-Pred 77 %    FEV1FVC-Pre 75 %    FEFMax-Pred 4.93 L/sec    FEFMax-Pre 4.97 L/sec    FEFMax-%Pred-Pre 100 %    FEF2575-Pred 1.58 L/sec    FEF2575-Pre 1.00 L/sec    LUM9986-%Pred-Pre 63 %    ExpTime-Pre 7.60 sec    FIFMax-Pre 3.24 L/sec    VC-Pred 3.02 L    VC-Pre 2.06 L    VC-%Pred-Pre 68 %    IC-Pred 2.64 L    IC-Pre 1.80 L    IC-%Pred-Pre 68 %    ERV-Pred 0.38 L    ERV-Pre 0.26 L    ERV-%Pred-Pre 67 %    FEV1FEV6-Pred 77 %    FEV1FEV6-Pre 76 %    FRCPleth-Pred 2.81 L    FRCPleth-Pre 2.43 L    FRCPleth-%Pred-Pre 86 %    RVPleth-Pred 2.31 L    RVPleth-Pre 2.18 L    RVPleth-%Pred-Pre 94 %    TLCPleth-Pred 5.19 L    TLCPleth-Pre 4.24 L    TLCPleth-%Pred-Pre 81 %    DLCOunc-Pred 19.70 ml/min/mmHg    DLCOunc-Pre 13.73 ml/min/mmHg    DLCOunc-%Pred-Pre 69 %    DLCOcor-Pre 15.96 ml/min/mmHg    DLCOcor-%Pred-Pre 81 %    VA-Pre 3.35 L    VA-%Pred-Pre 68 %    FEV1SVC-Pred 66 %    FEV1SVC-Pre 69 %       I reviewed pulmonary function test that was performed today. This shows normal spirometry, lung volumes, and diffusing capacity. Lung function is stable and overall improved compared to 2018 and 2019.    I reviewed results with the patient.      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    1. Follicular bronchiolitis with Sjogren's. Last CT scan showed improvement in her ILD but persistent changes of follicular bronchiolitis. She has done well with treatment of her follicular bronchiolitis. Repeat PFT is within normal limits. However, she has worsening shortness of breath associated with productive cough. She doesn't have any fever or sore throat. She has scattered inspiratory squeaks on ausculation. Her symptoms could be related to acute bronchiolitis secondary to viral infection. We plan to get Xray chest and pro BNP to rule out other possibilities of worsening shortness.     We recommend to try prednisone 20  mg for 1 week then she can go back to regular dose of prednisone 5 mg daily.     We will continue  azithromycin 250 mg daily, montelukast 10 mg daily, and Breo Ellipta inhaler 100-25. She is tolerating the medications well. I will see her back in 6 months with full PFT.    2. Anemia: she has hemoglobin drop and positive FIT test. Colorectal surgery considering colonoscopy and EGD for further evaluation of anemia.     Patient discussed and staffed with Dr. Andrade, MD Willie Saldana,   Rheumatology fellow       I saw and evaluated patient with Fellow.  Case discussed - agree with note.  I reviewed pulmonary function test that was performed today.  This shows normal spirometry, lung volumes and corrected diffusing capacity.  PFT today is within the range of what its been in the past 4 years.  I also reviewed BNP today, and it is within normal limits.  I reviewed chest x-ray today, and my interpretation is there is no pneumonia or pulmonary edema.  Her increasing shortness of breath could be attributable to her new anemia.  However, that would not cause cough or mucus production.  She appears to be having an exacerbation of her follicular bronchiolitis, and I will treat with prednisone 20 mg daily for 1 week.    I spent 36 minutes reviewing chart, reviewing test results, talking with and examining patient, formulating plan, and documentation on the day of the encounter.      SAMAN ANDRADE M.D.

## 2022-04-26 ENCOUNTER — PATIENT OUTREACH (OUTPATIENT)
Dept: SURGERY | Facility: CLINIC | Age: 82
End: 2022-04-26
Payer: MEDICARE

## 2022-04-26 ENCOUNTER — TELEPHONE (OUTPATIENT)
Dept: GASTROENTEROLOGY | Facility: CLINIC | Age: 82
End: 2022-04-26
Payer: MEDICARE

## 2022-04-26 DIAGNOSIS — K62.5 RECTAL BLEEDING: ICD-10-CM

## 2022-04-26 DIAGNOSIS — R19.5 FECAL OCCULT BLOOD TEST POSITIVE: Primary | ICD-10-CM

## 2022-04-26 LAB
DLCOCOR-%PRED-PRE: 81 %
DLCOCOR-PRE: 15.96 ML/MIN/MMHG
DLCOUNC-%PRED-PRE: 69 %
DLCOUNC-PRE: 13.73 ML/MIN/MMHG
DLCOUNC-PRED: 19.7 ML/MIN/MMHG
ERV-%PRED-PRE: 67 %
ERV-PRE: 0.26 L
ERV-PRED: 0.38 L
EXPTIME-PRE: 7.6 SEC
FEF2575-%PRED-PRE: 63 %
FEF2575-PRE: 1 L/SEC
FEF2575-PRED: 1.58 L/SEC
FEFMAX-%PRED-PRE: 100 %
FEFMAX-PRE: 4.97 L/SEC
FEFMAX-PRED: 4.93 L/SEC
FEV1-%PRED-PRE: 70 %
FEV1-PRE: 1.42 L
FEV1FEV6-PRE: 76 %
FEV1FEV6-PRED: 77 %
FEV1FVC-PRE: 75 %
FEV1FVC-PRED: 77 %
FEV1SVC-PRE: 69 %
FEV1SVC-PRED: 66 %
FIFMAX-PRE: 3.24 L/SEC
FRCPLETH-%PRED-PRE: 86 %
FRCPLETH-PRE: 2.43 L
FRCPLETH-PRED: 2.81 L
FVC-%PRED-PRE: 71 %
FVC-PRE: 1.89 L
FVC-PRED: 2.65 L
IC-%PRED-PRE: 68 %
IC-PRE: 1.8 L
IC-PRED: 2.64 L
RVPLETH-%PRED-PRE: 94 %
RVPLETH-PRE: 2.18 L
RVPLETH-PRED: 2.31 L
TLCPLETH-%PRED-PRE: 81 %
TLCPLETH-PRE: 4.24 L
TLCPLETH-PRED: 5.19 L
VA-%PRED-PRE: 68 %
VA-PRE: 3.35 L
VC-%PRED-PRE: 68 %
VC-PRE: 2.06 L
VC-PRED: 3.02 L

## 2022-04-26 NOTE — TELEPHONE ENCOUNTER
Screening Questions  BlueKIND OF PREP RedLOCATION [review exclusion criteria] GreenSEDATION TYPE  1. Have you had a positive covid test in the last 90 days? N     2. Do you have a legal guardian or medical Power of ?  Are you able to give consent for your medical care?Y (Sedation review/consideration needed)    3. Are you active on mychart? Y    4. What insurance is in the chart? BCBS MEDICARE     3.   Ordering/Referring Provider: Kelechi Santana MD    4. BMI 31.6 [BMI OVER 40-EXTENDED PREP]  If greater than 40 review exclusion criteria [PAC APPT IF @ [facility]]        5.  Respiratory Screening :  [If yes to any of the following HOSPITAL setting only]     Do you use daily home oxygen? N    Do you have mod to severe Obstructive Sleep Apnea? Y  [OKAY @ [facility] SH PH RI]   Do you have Pulmonary Hypertension? N     Do you have UNCONTROLLED asthma? N        6.   Have you had a heart or lung transplant? N      7.   Are you currently on dialysis? N [ If yes, G-PREP & HOSPITAL setting only]     8.   Do you have chronic kidney disease? Y [ If yes, G-PREP ]    9.   Have you had a stroke or Transient ischemic attack (TIA - aka  mini stroke ) within 6 months?  N (If yes, please review exclusion criteria)    10.   In the past 6 months, have you had any heart related issues including cardiomyopathy or heart attack? N           If yes, did it require cardiac stenting or other implantable device? N      11.   Do you have any implantable devices in your body (pacemaker, defib, LVAD)? N (If yes, please review exclusion criteria)    12.   Do you take nitroglycerin? N           If yes, how often? N  (if yes, HOSPITAL setting ONLY)    13.   Are you currently taking any blood thinners? Y           [IF YES, INFORM PATIENT TO FOLLOW UP W/ ORDERING PROVIDER FOR BRIDGING INSTRUCTIONS]     14.   Do you have a diagnosis of diabetes? Y   [ If yes, G-PREP ]    15.   [FEMALES] Are you currently pregnant? N    If yes, how many weeks? N      16.   Are you taking any prescription pain medications on a routine schedule?  N  [ If yes, EXTENDED PREP.] [If yes, MAC]    17.   Do you have any chemical dependencies such as alcohol, street drugs, or methadone?  N [If yes, MAC]    18.   Do you have any history of post-traumatic stress syndrome, severe anxiety or history of psychosis?  N  [If yes, MAC]    19.   Do you transfer independently?  Y    20.  On a regular basis do you go 3-5 days between bowel movements? N   [ If yes, EXTENDED PREP.]    21.   Preferred LOCAL Pharmacy for Pre Prescription   [id number] - [facility] [address]      Scheduling Details      Caller : TERI  (Please ask for phone number if not scheduled by patient)    Type of Procedure Scheduled: DOUBLE  Which Colonoscopy Prep was Sent?: AUTUMN MILTON CF PATIENTS & GRORICHARD'S PATIENTS NEEDS EXTENDED PREP  Surgeon:   Date of Procedure:   Location: [facility]      Sedation Type: CS  Conscious Sedation- Needs  for 6 hours after the procedure  MAC/General-Needs  for 24 hours after procedure    Pre-op Required at [facility] [place], [place] and OR for MAC sedation:   (advise patient they will need a pre-op prior to procedure -)      Informed patient they will need an adult    Cannot take any type of public or medical transportation alone y    [facility]Pro[facility] or Externally:     Confirmed Nurse will call to complete assessment y    Additional comments:  Waiting for response from basket

## 2022-04-26 NOTE — PROGRESS NOTES
Orders from Dr. Santana to repeat HGB while pt is scheduling colonoscopy. Orders placed and called Nghia, who schedules for the patient. Nghia states that Sister Betty saw her pulmonologist last week. She has a cough (neg for COVID). Scheduled blood work for 5/2/2022 and endoscopy will reach out on Friday to check in to schedule the endo procedures.

## 2022-04-27 ENCOUNTER — TELEPHONE (OUTPATIENT)
Dept: GASTROENTEROLOGY | Facility: CLINIC | Age: 82
End: 2022-04-27

## 2022-04-27 ENCOUNTER — PRE VISIT (OUTPATIENT)
Dept: SURGERY | Facility: CLINIC | Age: 82
End: 2022-04-27

## 2022-04-27 ENCOUNTER — ANESTHESIA EVENT (OUTPATIENT)
Dept: GASTROENTEROLOGY | Facility: CLINIC | Age: 82
End: 2022-04-27
Payer: MEDICARE

## 2022-04-27 ENCOUNTER — TELEPHONE (OUTPATIENT)
Dept: FAMILY MEDICINE | Facility: CLINIC | Age: 82
End: 2022-04-27

## 2022-04-27 ENCOUNTER — VIRTUAL VISIT (OUTPATIENT)
Dept: SURGERY | Facility: CLINIC | Age: 82
End: 2022-04-27
Payer: MEDICARE

## 2022-04-27 DIAGNOSIS — Z01.818 PREOP EXAMINATION: Primary | ICD-10-CM

## 2022-04-27 DIAGNOSIS — Z11.59 ENCOUNTER FOR SCREENING FOR OTHER VIRAL DISEASES: Primary | ICD-10-CM

## 2022-04-27 DIAGNOSIS — R19.5 POSITIVE FIT (FECAL IMMUNOCHEMICAL TEST): Primary | ICD-10-CM

## 2022-04-27 PROCEDURE — 99204 OFFICE O/P NEW MOD 45 MIN: CPT | Mod: 95 | Performed by: PHYSICIAN ASSISTANT

## 2022-04-27 RX ORDER — BISACODYL 5 MG/1
TABLET, DELAYED RELEASE ORAL
Qty: 4 TABLET | Refills: 0 | Status: SHIPPED | OUTPATIENT
Start: 2022-04-27 | End: 2022-07-13

## 2022-04-27 ASSESSMENT — ENCOUNTER SYMPTOMS
SEIZURES: 0
DYSRHYTHMIAS: 1

## 2022-04-27 ASSESSMENT — LIFESTYLE VARIABLES: TOBACCO_USE: 1

## 2022-04-27 ASSESSMENT — PAIN SCALES - GENERAL: PAINLEVEL: NO PAIN (0)

## 2022-04-27 NOTE — PROGRESS NOTES
Anticoagulation Note - Preoperative Assessment Center (PAC) Pharmacist     Patient was interviewed on April 27, 2022 as a part of PAC clinic appointment. The purpose of this note is to document the perioperative anticoagulation plan outlined by the providers caring for Betty Tee.     Current Regimen  Anticoagulation Regimen as of April 27, 2022: rivaroxaban (XARELTO) 20 mg by mouth once daily with evening meal    Indication: afib   Prescriber:  Dr. Rosa  Expected Duration of therapy: indefinite   Current medications that may interact with this include: azithromycin  Creatinine   Date Value Ref Range Status   04/22/2022 1.01 0.52 - 1.04 mg/dL Final   06/04/2021 1.11 (H) 0.52 - 1.04 mg/dL Final   CrCl ~48 mL/min     Perioperative plan  Betty Tee is scheduled for colonoscopy on 5/3/22 with Dr. Dias and the perioperative anticoagulation plan outlined by GI team and PAC is to hold x2 days pre op.  Message out to Dr. Rosa to confirm this plan.     Resumption of anticoagulation after procedure will be based on surgery team assessment of bleeding risks and complications.  This plan may require re-assessment and modification by her primary team in the perioperative setting depending on patients clinical situation.        Oh Sosa [facility]  April 27, 2022  11:29 AM        Addendum 1 (May 2, 2022):    Message received back from Dr. Rosa who is in agreement with above hold plan.

## 2022-04-27 NOTE — TELEPHONE ENCOUNTER
FUTURE VISIT INFORMATION      SURGERY INFORMATION:    Date: 5/3/22    [facility] gi    Surgeon:  Guru Winsome Dias MD    Anesthesia Type:  mac    Procedure: COLONOSCOPY    RECORDS REQUESTED FROM:        Primary Care Provider: Ilene Tristan MD  - [facility]    Pertinent Medical History: ashwin, paroxysmal atrial fibrillation, hypertension, left ventricular hypertrophy, HFpEF, heart failure    Most recent EKG+ Tracin22    Most recent ECHO:21    Most recent Cardiac Stress Test: 14    Most recent PFT's: 22    Most recent Sleep Study:  16

## 2022-04-27 NOTE — PATIENT INSTRUCTIONS
Name:  Betty Tee   MRN:  [id number]   :  1940   Today's Date:  2022     GI Lab procedures:    A representative from the GI Lab will contact you regarding arrival date and time      You were seen today in the PAC Clinic   (Pre operative Anesthesia Assessment Center)  [address]  [address]   phone [phone]    You had a pre operative assessment done:    Anesthesia recommendations for medications:    Hold aspirin for 2 days before procedure  Hold multivitamins for 7 days before procedure   Hold herbal medications and supplements for 7 days before procedure  Hold ibuprofen for 2 days before procedure   Hold Naproxen for 2 days before procedure       Special instructions for anticoagulation medications:    Rivaroxaban (Xarelto) hold for 2 days before your procedure - last dose to be 22      Please hold the following medications the day of procedure:    Cevimeline (Evoxac)  Cholecalciferol (Vitamin D3)  Cyanocobalamin (Vitamin B12)  Ferrous sulfate (Ferosul)  Novolog insulin  KLOR-CON      Please take these medications the day of procedure:    Tylenol if needed  Albuterol inhaler if needed and bring this to the hospital  Atorvastatin (Lipitor)  Azithromycin (Zithromax) if you are currently taking this  Escitalopram (Lexapro)  Fluticasone (Flonase) nasal spray  Fluticasone-vilanterol (Breo Ellipta) inhaler  Hydrocodone-acetaminophen (Norco) if needed  Hydroxychloroquine (Plaquenil)  Insulin Glargine (Basaglar) take 22 units which is 80% of your usual dose   Lidocaine patch if needed  Loratadine (Claritin) Methocarbamol (Robaxin) if needed  Metroprolol (Toprolol)  Pantoprazole (Protonix)  Spironolactone (Aldactone)  Torsemide (Demadex)            For questions or appointments, call:  [facility]: [phone], option 2  Monday through Friday, 8 a.m. to 4:30 p.m.  (If it is after hours please reach out to the clinic or provider you were scheduled  with.)

## 2022-04-27 NOTE — TELEPHONE ENCOUNTER
**Pt/Care Coordinator Nghia is expecting nurse call for Pre-assessment:   (Please call Nghia/Care coordinator: [phone])

## 2022-04-27 NOTE — PROGRESS NOTES
Sister Betty is a 81 year old who is being evaluated via a billable video visit.      How would you like to obtain your AVS? MyChart  If the video visit is dropped, the invitation should be resent by: Text to cell phone: [phone]       HPI         Review of Systems         Objective    Vitals - Patient Reported  Pain Score: No Pain (0) (Some Hip Pain)        Physical Exam

## 2022-04-27 NOTE — TELEPHONE ENCOUNTER
Patient scheduled for colonoscopy/EGD  on 5/3/22.     Covid test scheduled: 4/29/22    Pre op exam scheduled: 4/27/22 - PAC with Sophia Bravo PA-C    Arrival time: 1045    Facility location: [facility]     Sedation type: MAC     Indication for procedure: +fit, anemia    Anticoagulations? Rivaroxaban (Xarelto) Holding interval of 2 days    Bowel prep recommendation: Golytely d/t DM    Golytely prep sent to [facility]ACY #[id number] - [facility], [place] - [address]. Prep instructions sent via [employer].    Pre visit planning completed.    Beatriz Anaya RN

## 2022-04-27 NOTE — PROGRESS NOTES
Preoperative Assessment Center Medication History Note    Medication history completed on April 27, 2022 by this writer. See Epic admission navigator for prior to admission medications. Operating room staff will still need to confirm medications and last dose information on day of surgery.     Medication history interview sources  Patient interview: Yes  Care Everywhere records: No  Surescripts pharmacy refill records: Yes  Other (if applicable):     Changes made to PTA medication list  Added: none  Deleted: b12 duplicate, d3 duplicate  Changed: evoxac sig, estriol sig.     Additional medication history information (including reliability of information, actions taken by pharmacist):    -- Is actively on prophylactic azithromycin  -- is actively on a 7 day course of prednisone from her pulmonologist. This will end on Saturday.   -- Patient reports being on blood thinning medications - xarelto (see other note)   -- Patient declines being on any other prescription or over-the-counter medications    Prior to Admission medications    Medication Sig Last Dose Taking? Auth Provider   acetaminophen (TYLENOL) 500 MG tablet Take 1,000 mg by mouth every 8 hours as needed (max 6 tablets/24 hours, 2 tablets/dose)  Taking Yes Dominique Solorio APRN CNP   acyclovir (ZOVIRAX) 5 % external ointment Apply topically 6 times daily As needed for outbreaks Taking Yes Ilene Tristan MD   albuterol (PROAIR HFA/PROVENTIL HFA/VENTOLIN HFA) 108 (90 Base) MCG/ACT inhaler Inhale 2 puffs into the lungs every 6 hours Taking Yes Maryjane Tillman MD   alendronate (FOSAMAX) 70 MG tablet Take 1 tablet (70 mg) by mouth every 7 days  Patient taking differently: Take 70 mg by mouth every 7 days Mondays Taking Yes Burke Lemon MD   artificial tears OINT ophthalmic ointment Place 1 g into both eyes At Bedtime Taking Yes Cristy Charles MD   atorvastatin (LIPITOR) 10 MG tablet TAKE 1/2 TABLET BY MOUTH EVERY DAY Taking Yes Kun  Ilene Mahan MD   azithromycin (ZITHROMAX) 250 MG tablet Take 1 tablet (250 mg) by mouth daily Taking Yes Maryjane Tillman MD   cevimeline (EVOXAC) 30 MG capsule Take 1 capsule (30 mg) by mouth 3 times daily  Patient taking differently: Take 30 mg by mouth every other day Taking Yes Zulma Lopez MD   Cholecalciferol (VITAMIN D3) 50 MCG (2000 UT) CAPS Take 1 tablet (50 mcg) by mouth daily - Oral Taking Yes Zulma Lopez MD   COMPOUNDED NON-CONTROLLED SUBSTANCE (CMPD RX) - PHARMACY TO MIX COMPOUNDED MEDICATION Estriol 1 mg/g Apply small amount to finger and apply to inside vagina daily for 2 weeks then twice weekly Route: vaginally Dispense 30 grams 11 refills  Patient taking differently: Estriol 1 mg/g Apply small amount to finger and apply to inside vagina twice weekly Route: vaginally Dispense 30 grams 11 refills Taking Yes Vale Nassar MD   cyanocobalamin (VITAMIN B-12) 1000 MCG tablet Take 1 tablet (1,000 mcg) by mouth daily Taking Yes Ilene Tristan MD   escitalopram (LEXAPRO) 20 MG tablet TAKE 1 TABLET BY MOUTH EVERY DAY Taking Yes Ilene Tristan MD   ferrous sulfate (FEROSUL) 325 (65 Fe) MG tablet TAKE 1 TABLET BY MOUTH EVERY DAY WITH BREAKFAST Taking Yes Ilene Tristan MD   fluticasone (FLONASE) 50 MCG/ACT nasal spray Spray 1-2 sprays into both nostrils daily as needed for allergies Taking Yes Maryjane Tillman MD   fluticasone-vilanterol (BREO ELLIPTA) 100-25 MCG/INH inhaler Inhale 1 puff into the lungs daily Taking Yes Maryjane Tillman MD   HYDROcodone-acetaminophen (NORCO) 7.5-325 MG per tablet Take 1 tablet by mouth every 12 hours OK to fill and start 03/31/22 Taking Yes Martinez Pearson MD   hydroxychloroquine (PLAQUENIL) 200 MG tablet Take 2 tablets (400 mg) by mouth daily Get annual eye exams for hydroxychloroquine (Plaquenil) monitoring and fax to [phone] Taking Yes Zulma Lopez MD   insulin aspart (NOVOLOG FLEXPEN) 100 UNIT/ML pen Inject 11 Units  Subcutaneous 3 times daily (with meals) Taking Yes Ilene Tristan MD   insulin glargine (BASAGLAR KWIKPEN) 100 UNIT/ML pen Sig: INJECT 28 UNITS SUBCUTANEOUS DAILY (down from 32 units in 3/22)  Patient taking differently: Sig: INJECT 28 UNITS SUBCUTANEOUS DAILY IN THE MORNING   (down from 32 units in 3/22) Taking Yes Ilene Tristan MD   ketoconazole (NIZORAL) 2 % external cream Apply topically 2 times daily as needed for itching Taking Yes Ileen Tristan MD   KLOR-CON 20 MEQ CR tablet TAKE 2 TABLETS (40 MEQ) BY MOUTH EVERY MORNING AND 1 TABLET (20 MEQ) EVERY EVENING. Taking Yes Radha Rosa MD   lidocaine (LIDODERM) 5 % patch APPLY PATCH TO PAINFUL AREA FOR UP TO 12 H WITHIN A 24 H PERIOD. REMOVE AFTER 12 HOURS.  Patient taking differently: Apply patch to painful area as needed for up to 12 h within a 24 h period.  Remove after 12 hours. Taking Yes Ilene Tristan MD   loratadine (CLARITIN) 10 MG tablet TAKE 1 TABLET BY MOUTH EVERY DAY Taking Yes Ilene Tristan MD   methocarbamol (ROBAXIN) 750 MG tablet Take 1 tablet (750 mg) by mouth 3 times daily as needed for muscle spasms Taking Yes Charly Moore MD   metoprolol succinate ER (TOPROL-XL) 50 MG 24 hr tablet Take 1 tablet (50 mg) by mouth 2 times daily Taking Yes Amy Moreno MD   montelukast (SINGULAIR) 10 MG tablet TAKE 1 TABLET BY MOUTH EVERYDAY AT BEDTIME Taking Yes Ilene Tristan MD   pantoprazole (PROTONIX) 40 MG EC tablet Take 1 tablet (40 mg) by mouth daily (replaces famotidine- should stop taking famotidine) Taking Yes Ilene Tristan MD   predniSONE (DELTASONE) 20 MG tablet Take 1 tablet (20 mg) daily for 7 days. Taking Yes Maryjane Tillman MD   rivaroxaban ANTICOAGULANT (XARELTO ANTICOAGULANT) 20 MG TABS tablet TAKE 1 TABLET BY MOUTH DAILY WITH DINNER Taking Yes Radha Rosa MD   Semaglutide, 1 MG/DOSE, (OZEMPIC, 1 MG/DOSE,) 4 MG/3ML SOPN Inject 1 mg  Subcutaneous once a week  Patient taking differently: Inject 1 mg Subcutaneous once a week Mondays Taking Yes Ilene Tristan MD   spironolactone (ALDACTONE) 25 MG tablet Take 2 tablets (50 mg) by mouth daily Taking Yes Radha Rosa MD   torsemide (DEMADEX) 10 MG tablet TAKE ONE TAB (10 MG) WITH ONE 20 MG TAB TO = 30 MG DAILY IN AFTERNOON. Taking Yes Radha Rosa MD   torsemide (DEMADEX) 20 MG tablet TAKE 2 TABLETS (40 MG) BY MOUTH EVERY MORNING AND 1 TABLET (20 MG) DAILY AT 2 PM. TAKE THE AFTERNOON DOSE WITH AN EXTRA 10 MG TAB FOR A TOTAL OF 30 MG IN THE AFTERNOON Taking Yes Radha Rosa MD   traZODone (DESYREL) 50 MG tablet Take 0.5-1.5 tablets (25-75 mg) by mouth nightly as needed for sleep Taking Yes Ilene Tristan MD   ACCU-CHEK SHANNON PLUS test strip USE TO TEST BLOOD SUGARS 3 TIMES DAILY   Ilene Tristan MD   acyclovir (ZOVIRAX) 400 MG tablet Take 1 tablet (400 mg) by mouth 3 times daily For a couple days  Patient not taking: Reported on 4/27/2022 Not Taking  Ilene Tristan MD   anakinra (KINERET) 100 MG/0.67ML SOSY injection 100 mg every day x 3 days as needed for flare ups  Patient not taking: Reported on 4/27/2022 Not Taking  Zulma Lopez MD   BD PEN NEEDLE JANE 2ND GEN 32G X 4 MM miscellaneous USE 4 DAILY AS DIRECTED.   Ilene Tristan MD   bisacodyl (DULCOLAX) 5 MG EC tablet Take as directed. One day before exam take 2 tablets at 3 PM. Take 2 tablets at 11 PM.   Justin Almaguer MD   blood glucose (NO BRAND SPECIFIED) lancets standard Use to test blood sugar 3 times daily or as directed   Ilene Tristan MD   polyethylene glycol (GOLYTELY) 236 g suspension Take as directed. One day before exam fill the jug with water. Cover and shake until well mixed. At 6 PM start drinking an 8oz glass of mixture every 15 minutes until jug is 1/2 empty. Store remainder in the refrigerator.  At 11 PM Start drinking the other  half of the Golytely jug. Drink one 8-ounce glass every 15 minutes until the jug is empty.   Justin Almaguer MD   predniSONE (DELTASONE) 5 MG tablet Take 1 tablet (5 mg) by mouth daily  Patient not taking: Reported on 4/27/2022 Not Taking  Zulma Lopez MD          Medication history completed by: Oh Sosa [facility]

## 2022-04-27 NOTE — TELEPHONE ENCOUNTER
CW  Patient is having a colonoscopy and EGD on Tuesday 5/3/22 at 10:40 am  Will be on clear liquids on Monday.    Was instructed to check in with you regarding Basaglar and Novolog doses.    Was told to to cut down on Basaglar by 80% on Monday. Should pt also take 80% on Tuesday since patient takes Basaglar in mornings?    Recommendations for Novolog?    Thank you,  Kerline Leo RN  [place]

## 2022-04-27 NOTE — TELEPHONE ENCOUNTER
**Pt/Care Coordinator Nghia is expecting nurse call for Pre-assessment:   (Please call Nghia/Care coordinator: [phone])

## 2022-04-27 NOTE — H&P
Pre-Operative H & P     CC:  Preoperative exam to assess for increased cardiopulmonary risk while undergoing surgery and anesthesia.    Date of Encounter: 4/27/2022  Primary Care Physician:  Ilene Tristan     Reason for Visit: Anemia, unspecified type; Fecal occult blood test positive    HPI  Betty Tee is a 81 year old female who presents for pre-operative H & P in preparation for  Procedure Information     Case: [id number] Date/Time: 05/03/22 1215    Procedures:       COLONOSCOPY (N/A Anus) - Anemia, unspecified type [D64.9]  Fecal occult blood test positive [R19.5]      ESOPHAGOGASTRODUODENOSCOPY (EGD) (N/A Esophagus) - Anemia, unspecified type [D64.9]  Fecal occult blood test positive [R19.5]    **Pt is Diabetic   **Pt has CKD   **Pt has ANGELICA    Anesthesia type: Monitor Anesthesia Care    Diagnosis:       Anemia, unspecified type [D64.9]      Fecal occult blood test positive [R19.5]    Pre-op diagnosis:       Anemia, unspecified type [D64.9]      Fecal occult blood test positive [R19.5]    Location:  [facility] /  GI    Providers: Guru Winsome Dias MD          Patient is being evaluated for comorbid conditions of Sjogren's follicular bronchiolitis, chronic systemic steroid therapy, HFpEF NYHA III, HTN, A-fib ([id number] 5), chronic KIMBLE, HLD, ANGELICA w/ CPAP, alcoholism (in remission), allergic rhinitis, insomnia, RLS, depression, CKD, IDDM, obesity, hx DVT, GERD, hx colo-uterine fistula s/p sigmoidectomy, TMJ arthralgia, osteoporosis, gout, severe right hip OA, chronic immunosuppression.    Ms. ALVIN Munroe is being evaluated for rectal bleeding and anemia. She now presents for the above procedure.     She was last seen in pulmonology on 4/22/22, at which time she was evaluated for a productive cough. Her PFT was unremarkable. She was treated with a 7 day course of prednisone 20 mg daily (ends 4/30/22), which is increased from her daily maintenance dose of 5 mg.    History was obtained  from patient & chart review.     Hx of abnormal bleeding or anti-platelet use: on Xarelto    Menstrual history: No LMP recorded. Patient has had a hysterectomy.:      Past Medical History  Past Medical History:   Diagnosis Date     Alcohol abuse, in remission      Allergic rhinitis, cause unspecified     allegra helps when she takes it     Antiplatelet or antithrombotic long-term use      Atrial fibrillation (H)     in hosp in 11/11 after surgery w/ fluid overload     Cardiomegaly     LVH on stress echo- cardiac w/u at [facility] ER- neg CT scan for PE, neg stress echo in 8/06     Chest pain, unspecified      Congestive heart failure (H)      Depressive disorder      Diabetes (H)      Disorder of bone and cartilage, unspecified     osteopenia (had been on prempro), improved on 6/06 dexa, stable dexa 11/10     Diverticulosis of colon (without mention of hemorrhage)     last episode yrs ago     Essential hypertension, benign      Follicular bronchiolitis (H)     associated with Sjogrens, dx by chest CT showing mosaic attenuation and air trapping     Gastro-oesophageal reflux disease      ILD (interstitial lung disease) (H)     associated with Sjogrens, also has mildly elevated IgG4, first noted on chest CT 2015 (mild changes) and also has small airways disease; ILD improved on follow up chest CT 2018.     Insomnia, unspecified     weaned off clonazepam     Irregular heart beat      Lumbago 7/09    MRI with NEAL, now seeing Dr. Cain for sciatic sx's     Major depressive disorder, recurrent episode, moderate (H)      Obstructive sleep apnea     uses cpap     Osteoarthrosis, unspecified whether generalized or localized, unspecified site      Sjogren's syndrome (H)     + RG and SSA and lip bx     Sleep apnea      Tobacco use disorder     chantix in 9/07, started again in 6/08, working       Past Surgical History  Past Surgical History:   Procedure Laterality Date     APPENDECTOMY       BACK SURGERY  1962     BACK SURGERY   1962     BIOPSY BREAST  09/27/2002    Biopsy Left Breast     BIOPSY BREAST Left 09/27/2002     BREAST SURGERY       CARDIAC SURGERY       CARDIAC SURGERY       CHOLECYSTECTOMY  1990's?     CHOLECYSTECTOMY       COLECTOMY LEFT  11/07/2011    Procedure:COLECTOMY LEFT; Laparoscopic mobilization of splenic flexture, sigmoid colectomy, coloprotoscopy, loop illeostomy; Surgeon:CK CASTANEDA; Location:[facility] OR     COLECTOMY LEFT  11/07/2011     COLONOSCOPY  1990,s     ENT SURGERY       EYE SURGERY  2012     FLEXIBLE SIGMOIDOSCOPY  11/03/2011     HYSTERECTOMY TOTAL ABDOMINAL, BILATERAL SALPINGO-OOPHORECTOMY, COMBINED  11/07/2011    Procedure:COMBINED HYSTERECTOMY TOTAL ABDOMINAL, BILATERAL SALPINGO-OOPHORECTOMY; total abdominal hysterectomy, bilateral salpingo-oophorectomy; Surgeon:ALETA MANUEL; Location:[facility] OR     HYSTERECTOMY TOTAL ABDOMINAL, BILATERAL SALPINGO-OOPHORECTOMY, COMBINED  11/07/2011     ILEOSTOMY  02/01/2012    takedown loop ileostomy      INSERT STENT URETER  11/07/2011    Procedure:INSERT STENT URETER; Placement of Bilateral Ureteral Stents ; Surgeon:PRANEETH BRYANT; Location:[facility] OR     SIGMOIDECTOMY  02/01/2012    Dr. Castaneda, Sigmoidectomy for diverticular abscess, iliostomy afterwards until repair     SIGMOIDOSCOPY FLEXIBLE  11/03/2011    Procedure:SIGMOIDOSCOPY FLEXIBLE; Flexible Sigmoidoscopy; Surgeon:CK CASTANEDA; Location:[facility] OR     TAKEDOWN ILEOSTOMY  02/01/2012    Procedure:TAKEDOWN ILEOSTOMY; Takedown Loop Ileostomy ; Surgeon:CK CASTANEDA; Location:[facility] OR     URETERAL STENT PLACEMENT  11/07/2011     ZZC APPENDECTOMY  1970's?     ZZC NONSPECIFIC PROCEDURE  11/2005    exploratory abd lap, adhesions, resolved RLQ pain, diverticulitis episodes       Prior to Admission Medications  Current Outpatient Medications   Medication Sig Dispense Refill     acetaminophen (TYLENOL) 500 MG tablet Take 1,000 mg by mouth every 8 hours as needed (max 6 tablets/24 hours, 2 tablets/dose)        acyclovir  (ZOVIRAX) 5 % external ointment Apply topically 6 times daily As needed for outbreaks 15 g 3     albuterol (PROAIR HFA/PROVENTIL HFA/VENTOLIN HFA) 108 (90 Base) MCG/ACT inhaler Inhale 2 puffs into the lungs every 6 hours 18 g 11     alendronate (FOSAMAX) 70 MG tablet Take 1 tablet (70 mg) by mouth every 7 days (Patient taking differently: Take 70 mg by mouth every 7 days Mondays) 12 tablet 0     artificial tears OINT ophthalmic ointment Place 1 g into both eyes At Bedtime 7 g 11     atorvastatin (LIPITOR) 10 MG tablet TAKE 1/2 TABLET BY MOUTH EVERY DAY 45 tablet 2     azithromycin (ZITHROMAX) 250 MG tablet Take 1 tablet (250 mg) by mouth daily 30 tablet 11     cevimeline (EVOXAC) 30 MG capsule Take 1 capsule (30 mg) by mouth 3 times daily (Patient taking differently: Take 30 mg by mouth every other day) 270 capsule 2     Cholecalciferol (VITAMIN D3) 50 MCG (2000 UT) CAPS Take 1 tablet (50 mcg) by mouth daily - Oral 90 capsule 2     COMPOUNDED NON-CONTROLLED SUBSTANCE (CMPD RX) - PHARMACY TO MIX COMPOUNDED MEDICATION Estriol 1 mg/g Apply small amount to finger and apply to inside vagina daily for 2 weeks then twice weekly Route: vaginally Dispense 30 grams 11 refills (Patient taking differently: Estriol 1 mg/g Apply small amount to finger and apply to inside vagina twice weekly Route: vaginally Dispense 30 grams 11 refills) 30 g 11     cyanocobalamin (VITAMIN B-12) 1000 MCG tablet Take 1 tablet (1,000 mcg) by mouth daily 30 tablet 1     escitalopram (LEXAPRO) 20 MG tablet TAKE 1 TABLET BY MOUTH EVERY DAY 90 tablet 0     ferrous sulfate (FEROSUL) 325 (65 Fe) MG tablet TAKE 1 TABLET BY MOUTH EVERY DAY WITH BREAKFAST 30 tablet 1     fluticasone (FLONASE) 50 MCG/ACT nasal spray Spray 1-2 sprays into both nostrils daily as needed for allergies 18.2 mL 11     fluticasone-vilanterol (BREO ELLIPTA) 100-25 MCG/INH inhaler Inhale 1 puff into the lungs daily 1 each 11     HYDROcodone-acetaminophen (NORCO) 7.5-325 MG per tablet  Take 1 tablet by mouth every 12 hours OK to fill and start 03/31/22 60 tablet 0     hydroxychloroquine (PLAQUENIL) 200 MG tablet Take 2 tablets (400 mg) by mouth daily Get annual eye exams for hydroxychloroquine (Plaquenil) monitoring and fax to [phone] 180 tablet 0     insulin aspart (NOVOLOG FLEXPEN) 100 UNIT/ML pen Inject 11 Units Subcutaneous 3 times daily (with meals) 15 mL 2     insulin glargine (BASAGLAR KWIKPEN) 100 UNIT/ML pen Sig: INJECT 28 UNITS SUBCUTANEOUS DAILY (down from 32 units in 3/22) (Patient taking differently: Sig: INJECT 28 UNITS SUBCUTANEOUS DAILY IN THE MORNING   (down from 32 units in 3/22)) 15 mL 0     ketoconazole (NIZORAL) 2 % external cream Apply topically 2 times daily as needed for itching 60 g 1     KLOR-CON 20 MEQ CR tablet TAKE 2 TABLETS (40 MEQ) BY MOUTH EVERY MORNING AND 1 TABLET (20 MEQ) EVERY EVENING. 270 tablet 3     lidocaine (LIDODERM) 5 % patch APPLY PATCH TO PAINFUL AREA FOR UP TO 12 H WITHIN A 24 H PERIOD. REMOVE AFTER 12 HOURS. (Patient taking differently: Apply patch to painful area as needed for up to 12 h within a 24 h period.  Remove after 12 hours.) 30 patch 2     loratadine (CLARITIN) 10 MG tablet TAKE 1 TABLET BY MOUTH EVERY DAY 90 tablet 3     methocarbamol (ROBAXIN) 750 MG tablet Take 1 tablet (750 mg) by mouth 3 times daily as needed for muscle spasms 90 tablet 3     metoprolol succinate ER (TOPROL-XL) 50 MG 24 hr tablet Take 1 tablet (50 mg) by mouth 2 times daily 90 tablet 3     montelukast (SINGULAIR) 10 MG tablet TAKE 1 TABLET BY MOUTH EVERYDAY AT BEDTIME 90 tablet 0     pantoprazole (PROTONIX) 40 MG EC tablet Take 1 tablet (40 mg) by mouth daily (replaces famotidine- should stop taking famotidine) 90 tablet 1     predniSONE (DELTASONE) 20 MG tablet Take 1 tablet (20 mg) daily for 7 days. 7 tablet 0     rivaroxaban ANTICOAGULANT (XARELTO ANTICOAGULANT) 20 MG TABS tablet TAKE 1 TABLET BY MOUTH DAILY WITH DINNER 90 tablet 3     Semaglutide, 1 MG/DOSE,  (OZEMPIC, 1 MG/DOSE,) 4 MG/3ML SOPN Inject 1 mg Subcutaneous once a week (Patient taking differently: Inject 1 mg Subcutaneous once a week Mondays) 9 mL 1     spironolactone (ALDACTONE) 25 MG tablet Take 2 tablets (50 mg) by mouth daily 180 tablet 3     torsemide (DEMADEX) 10 MG tablet TAKE ONE TAB (10 MG) WITH ONE 20 MG TAB TO = 30 MG DAILY IN AFTERNOON. 90 tablet 3     torsemide (DEMADEX) 20 MG tablet TAKE 2 TABLETS (40 MG) BY MOUTH EVERY MORNING AND 1 TABLET (20 MG) DAILY AT 2 PM. TAKE THE AFTERNOON DOSE WITH AN EXTRA 10 MG TAB FOR A TOTAL OF 30 MG IN THE AFTERNOON 270 tablet 3     traZODone (DESYREL) 50 MG tablet Take 0.5-1.5 tablets (25-75 mg) by mouth nightly as needed for sleep 135 tablet 1     ACCU-CHEK SHANNON PLUS test strip USE TO TEST BLOOD SUGARS 3 TIMES DAILY 300 strip 5     acyclovir (ZOVIRAX) 400 MG tablet Take 1 tablet (400 mg) by mouth 3 times daily For a couple days (Patient not taking: Reported on 4/27/2022) 15 tablet 2     anakinra (KINERET) 100 MG/0.67ML SOSY injection 100 mg every day x 3 days as needed for flare ups (Patient not taking: Reported on 4/27/2022) 6.7 mL 3     BD PEN NEEDLE JANE 2ND GEN 32G X 4 MM miscellaneous USE 4 DAILY AS DIRECTED. 400 each 1     bisacodyl (DULCOLAX) 5 MG EC tablet Take as directed. One day before exam take 2 tablets at 3 PM. Take 2 tablets at 11 PM. 4 tablet 0     blood glucose (NO BRAND SPECIFIED) lancets standard Use to test blood sugar 3 times daily or as directed 100 lancet 3     polyethylene glycol (GOLYTELY) 236 g suspension Take as directed. One day before exam fill the jug with water. Cover and shake until well mixed. At 6 PM start drinking an 8oz glass of mixture every 15 minutes until jug is 1/2 empty. Store remainder in the refrigerator.  At 11 PM Start drinking the other half of the Golytely jug. Drink one 8-ounce glass every 15 minutes until the jug is empty. 4000 mL 0     predniSONE (DELTASONE) 5 MG tablet Take 1 tablet (5 mg) by mouth daily  (Patient not taking: Reported on 4/27/2022) 30 tablet 0       Allergies  Allergies   Allergen Reactions     Amoxicillin-Pot Clavulanate      Augmentin Nausea and Vomiting     Codeine Nausea and Vomiting     Codeine      PN: LW Reaction: HIVES     Penicillins Nausea and Vomiting     PN: LW Reaction: GI Upset     Phenobarbital Itching     Phenobarbital      Seasonal Allergies        Social History  Social History     Socioeconomic History     Marital status: Single     Spouse name: Not on file     Number of children: 0     Years of education: Ed Spec De     Highest education level: Not on file   Occupational History     Occupation: Professor     Employer: [employer]     Comment: [employer]- Education   Tobacco Use     Smoking status: Former Smoker     Packs/day: 0.50     Years: 10.00     Pack years: 5.00     Types: Cigarettes     Quit date: 8/1/2011     Years since quitting: 10.7     Smokeless tobacco: Never Used     Tobacco comment: 1/2 ppd   Substance and Sexual Activity     Alcohol use: No     Alcohol/week: 0.0 standard drinks     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     Drug use: No     Sexual activity: Never   Other Topics Concern     Parent/sibling w/ CABG, MI or angioplasty before 65F 55M? Yes   Social History Narrative                 Social Determinants of Health     Financial Resource Strain: Not on file   Food Insecurity: Not on file   Transportation Needs: Not on file   Physical Activity: Not on file   Stress: Not on file   Social Connections: Not on file   Intimate Partner Violence: Not on file   Housing Stability: Not on file       Family History  Family History   Problem Relation Age of Onset     C.A.D. Mother 63        MI- first at age 63     Heart Disease Mother      Hypertension Mother      Cerebrovascular Disease Mother      Hyperlipidemia Mother      Coronary Artery Disease Mother         MI-first at age 63     Other - See Comments Mother         cerebrovascular  disease      Alcohol/Drug Father      Alzheimer Disease Father      Dementia Father      Hypertension Father      Hyperlipidemia Father      Substance Abuse Father      Diabetes Sister      Hypertension Sister      Hyperlipidemia Sister      Substance Abuse Sister      Asthma Sister      C.A.D. Sister 52        Minor MI- age 50's     Heart Disease Sister      Hypertension Sister      Hypertension Sister      Cancer - colorectal Sister 48        Late 40's early 50's     Gastrointestinal Disease Sister         Diverticulitis     Lipids Sister      Lipids Sister      Diabetes Sister      Heart Disease Sister         CHF     Cancer Sister         lung, smoker     Substance Abuse Sister      Asthma Sister      Cancer Sister      Coronary Artery Disease Sister         minor MI-age 50's     Heart Disease Sister      Hypertension Sister      Hypertension Sister      Colon Cancer Sister      Diverticulitis Sister      Lung Cancer Sister      Heart Disease Sister         CHF     Diabetes Sister      Asthma Sister      Hypertension Brother      Prostate Cancer Brother 74        Dx'd age 74     Gastrointestinal Disease Brother         Diverticulitis     Parkinsonism Brother      Substance Abuse Brother      Prostate Cancer Brother      Hyperlipidemia Brother      Hypertension Brother      Prostate Cancer Brother      Diverticulitis Brother      Parkinsonism Brother      Breast Cancer Daughter      Breast Cancer Daughter      Diabetes Other      Hypertension Other      Anesthesia Reaction No family hx of        Review of Systems  The complete review of systems is negative other than noted in the HPI or here.   Anesthesia Evaluation   Pt has had prior anesthetic.     No history of anesthetic complications       ROS/MED HX  ENT/Pulmonary: Comment: Sjogren's follicular bronchiolitis treated w/ prednisone dose 5 mg daily, azithromycin 250 mg daily, montelukast daily, and Breo Ellipta inhaler 100-25. Followed closely by rheumatology  & pulmonology.    PFT 4/22/22:  Normal Pulmonary Function    Developed productive cough last week. Seen by pulmonology on 4/22. Her PFT was unremarkable. She was treated with a 7 day course of prednisone 20 mg daily (ends 4/30/22), which is increased from her daily maintenance dose of 5 mg.    (+) sleep apnea, uses CPAP, tobacco use, Past use,     Neurologic: Comment: Insomnia  RLS   (-) no seizures and no CVA   Cardiovascular: Comment: HFpEF, NYHA III    [id number] 5    Followed by Radha Rosa MD in cardiology    (+) Dyslipidemia hypertension-----Taking blood thinners Instructions Given to patient: Hold Xarelto 2d prior. KIMBLE. dysrhythmias, a-fib and 1st Deg Heart Block, Previous cardiac testing   Echo: Date: 9/9/21 Results:  Global and regional left ventricular function is normal with an EF of 55-60%.  The right ventricle is normal size. Global right ventricular function is  normal.  No significant valvular abnormalities.  IVC diameter >2.1 cm collapsing <50% with sniff suggests a high RA pressure  estimated at 15 mmHg or greater.  This study was compared with the study from 08/27/2020. The LA filling  pressures are higher. There are no significant changes in the biventricular  function or aortic calibers.    Stress Test: Date: Results:    ECG Reviewed: Date: 2/11/22 Results:  Sinus bradycardia with 1st degree A-V block   Low voltage QRS   Borderline ECG   When compared with ECG of 03-MAY-2021 10:44,   No significant change was found  Ventricular rate 52 bpm    Cath: Date: Results:      METS/Exercise Tolerance: 1 - Eating, dressing Comment: Unable to walk one block due to SOB. Endorses more fatigue w/ blood loss anemia.     Hematologic:     (+) History of blood clots, pt is anticoagulated, anemia,  (-) history of blood transfusion   Musculoskeletal: Comment: Severe right hip OA    Gout, takes anakinra for flares    Osteoporosis        GI/Hepatic: Comment: Rectal bleeding, anemia    Fatty liver    hx  colo-uterine fistula s/p sigmoidectomy 2011, ileostomy takedown 2012      (+) GERD, Asymptomatic on medication, bowel prep, liver disease,     Renal/Genitourinary: Comment: Average creatinine 1.0, GFR 50s      (+) renal disease, type: CRI,     Endo:     (+) type II DM, Last HgA1c: 6.4, date: 2/11/22, Using insulin, - not using insulin pump. Chronic steroid usage for Other. Date most recently used: Sjogren's follicular bronchiolitis. Obesity,     Psychiatric/Substance Use:     (+) psychiatric history depression alcohol abuse     Infectious Disease:  - neg infectious disease ROS     Malignancy:  - neg malignancy ROS     Other: Comment: Immunosuppressed w/ Plaquenil & daily prednisone.           There were no vitals taken for this visit.    Physical Exam  Constitutional: Awake, alert, cooperative, no apparent distress, and appears stated age.  Neurologic: Awake, alert, oriented to name, place and time.   Neuropsychiatric: Calm, cooperative. Normal affect. Answers questions appropriately.    ** Patient's visit was done virtually today.  A full physical exam was not completed.  Please refer to the physical examination documented by the anesthesiologist in the anesthesia record on the day of surgery. **     Prior Labs/Diagnostic Studies   All labs and imaging personally reviewed     EKG - please see in ROS above     Echo result w/o MOPS 9/9/21: Interpretation SummaryGlobal and regional left ventricular function is normal with an EF of 55-60%.The right ventricle is normal size. Global right ventricular function isnormal.No significant valvular abnormalities.IVC diameter >2.1 cm collapsing <50% with sniff suggests a high RA pressureestimated at 15 mmHg or greater.This study was compared with the study from 08/27/2020. The LA fillingpressures are higher. There are no significant changes in the biventricularfunction or aortic calibers.      PFT 4/22/22  IMPRESSION:   Normal Pulmonary Function  PFT Latest Ref Rng & Units  4/22/2022   FVC L 1.89   FEV1 L 1.42   FVC% % 71   FEV1% % 70         The patient's records and results personally reviewed by this provider.     Labs to be collected on 4/29/22: Hgb, COVID    Assessment      Betty Tee is a 81 year old female seen as a PAC referral for risk assessment and optimization for anesthesia.    Plan/Recommendations  Pt will be optimized for the proposed procedure.  See below for details on the assessment, risk, and preoperative recommendations    NEUROLOGY  - No history of TIA, CVA or seizure  - RLS, insomnia  -Post Op delirium risk factors:  Age and High co-morbid index    ENT  - No current airway concerns.  Will need to be reassessed day of surgery.  Mallampati: Unable to assess  TM: > 3   - TMJ arthralgia    CARDIAC  - HFpEF, NYHA III, followed by Radha Rosa MD  - A-fib, [id number] 5  - HTN, will continue spironolactone, torsemide, metoprolol w/ MAC anesthesia  - Chronic KIMBLE  - Echo 9/9/21:  EF 55-60%, unremarkable  - EKG 2/1/22: sinus, 1st degree block, 52 bpm     - METS (Metabolic Equivalents) 1 - Unable to walk one block due to SOB. Endorses more fatigue w/ blood loss anemia. Has chronic KIMBLE @ baseline.     RCRI-Low risk: Class 2 0.9% complication rate            Total Score: 1    RCRI: Diabetes        PULMONARY  - ANGELICA w/ CPAP  - Follicular bronchiolitis - current regimen includes prednisone 5 mg daily, azithromycin 250 mg daily, montelukast daily, and Breo Ellipta inhaler 100-25.  - Seen in pulmonology 4/22/22 for a productive cough, no fever. Her PFT was unremarkable. She was treated with a 7 day course of prednisone 20 mg daily (ends 4/30/22), which is increased from her daily maintenance dose of 5 mg.    - Tobacco History      History   Smoking Status     Former Smoker     Packs/day: 0.50     Years: 10.00     Types: Cigarettes     Quit date: 8/1/2011   Smokeless Tobacco     Never Used     Comment: 1/2 ppd       GI  - GERD, asymptomatic on Protonix   - Rectal  "bleeding   - Fatty liver  - Hx colo-uterine fistula, s/p sigmoidectomy & hysterectomy in 2011, ileostomy takedown in 2012    PONV High Risk  Total Score: 3           1 AN PONV: Pt is Female    1 AN PONV: Patient is not a current smoker    1 AN PONV: Intended Post Op Opioids        /RENAL  - Baseline Creatinine  1.0    ENDOCRINE    - BMI: Estimated body mass index is 31.14 kg/m  as calculated from the following:    Height as of 4/22/22: 1.664 m (5' 5.5\").    Weight as of 4/22/22: 86.2 kg (190 lb).  Obesity (BMI >30)  - IDDM, on Novolog, glargine, semaglutide. A1c on 2/11/22 was 6.4  - On daily prednisone (see pulmonary above for dosing). Consider stress dose in perioperative period, if indicated.    HEME/IMMUNE  VTE Low Risk 0.5%  (may be higher if malignancy found during above procedure)          Total Score: 4    VTE: Greater than 59 yrs old    VTE: Pt history of VTE      - A-fib, hx LE DVT. On Xarelto, will hold 2d prior. Message sent by PAC pharmacist to Dr. Rosa to confirm plan.  - Anemia, Hgb 9.6 on 4/19/22. Will recheck on 5/2 for baseline.   - Sjogren's follicular bronchiolitis, followed by Dr. Lopez in rheumatology.   Chronic immunosuppression w/ prednisone, plaquenil     MSK  Patient is NOT Frail            Total Score: 1    Frailty: Weight loss 10 lbs or greater      - Gout, takes anakinra for flares  - Osteoporosis  - Severe right hip OA.  Recommend careful positioning throughout the perioperative period to prevent injury or exacerbation of pain.      PSYCH  - Alcoholism, in remission    Patient was discussed with Dr Dejesus    The patient is optimized for their procedure. AVS with information on surgery time/arrival time, meds and NPO status given by nursing staff. No further diagnostic testing indicated.    Please refer to the physical examination documented by the anesthesiologist in the anesthesia record on the day of surgery.    Video-Visit Details    Type of service:  Video Visit    Patient " verbally consented to video service today: YES    Video Start Time: 1522  Video End Time (time video stopped): 1534    Originating Location (pt. Location): Home    Distant Location (provider location):  [facility] PREOPER[facility]     Mode of Communication:  Video Conference via [employer]  On the day of service:     Prep time: 22 minutes  Visit time: 12 minutes  Documentation time: 15 minutes  ------------------------------------------  Total time: 49 minutes      Sophia Bravo PA-C  Preoperative Assessment Center  [facility]  Clinic and Surgery [facility]  Phone: [phone]  Fax: [phone]

## 2022-04-27 NOTE — TELEPHONE ENCOUNTER
Pre assessment questions completed for upcoming colonoscopy/EGD procedure scheduled on 5/3/22 with Nghia.     COVID test scheduled 4/29/22    Pre-op scheduled 4/27/22    Reviewed procedural arrival time 1045 and facility location [facility].    Designated  policy reviewed. Instructed to have someone stay 24 hours post procedure.     Reviewed Golytely prep instructions with patient. No fiber/iron supplements or foods that contain nuts/seeds 7 days prior to procedure.     Anticoagulation/blood thinners? Rivaroxaban (Xarelto)- hold 2 days. Patient to consult with provider prior to holding.     Patient verbalized understanding and had no questions or concerns at this time.    Beatriz Anaya RN

## 2022-04-28 NOTE — TELEPHONE ENCOUNTER
Sabrina/MTM team,   Could I have you help her with insulin questions?  Thank you!  Lev Tristan MD

## 2022-04-28 NOTE — TELEPHONE ENCOUNTER
Covering Sabrina's inbox while she is out of the office.    Per pre-op notes, patient should take hold Novolog insulin on the day of procedure (Tuesday). She was also told to take insulin Glargine (Basaglar) 22 units which is 80% of usual dose on the day of procedure (Tuesday).    Here is the list of medications to hold 2-7 days before procedure:   Hold aspirin for 2 days before procedure  Hold multivitamins for 7 days before procedure   Hold herbal medications and supplements for 7 days before procedure  Hold ibuprofen for 2 days before procedure   Hold Naproxen for 2 days before procedure   Hold Rivaroxaban (Xarelto) 2 days before your procedure - last dose to be 4/30/22    Tried calling patient to discuss, but no answer. Will send her a [employer] message.     Rae Gipson, PharmD   Medication Therapy Management   Pharmacist Resident  Pager: [phone]

## 2022-04-29 ENCOUNTER — LAB (OUTPATIENT)
Dept: LAB | Facility: CLINIC | Age: 82
End: 2022-04-29

## 2022-04-29 ENCOUNTER — HOSPITAL ENCOUNTER (OUTPATIENT)
Facility: CLINIC | Age: 82
Discharge: HOME | End: 2022-04-29
Attending: INTERNAL MEDICINE | Admitting: FAMILY MEDICINE
Payer: MEDICARE

## 2022-04-29 ENCOUNTER — LAB (OUTPATIENT)
Dept: LAB | Facility: CLINIC | Age: 82
End: 2022-04-29
Payer: MEDICARE

## 2022-04-29 DIAGNOSIS — R19.5 FECAL OCCULT BLOOD TEST POSITIVE: ICD-10-CM

## 2022-04-29 DIAGNOSIS — K62.5 RECTAL BLEEDING: ICD-10-CM

## 2022-04-29 DIAGNOSIS — Z11.59 ENCOUNTER FOR SCREENING FOR OTHER VIRAL DISEASES: ICD-10-CM

## 2022-04-29 LAB — HGB BLD-MCNC: 8.5 G/DL (ref 11.7–15.7)

## 2022-04-29 PROCEDURE — U0005 INFEC AGEN DETEC AMPLI PROBE: HCPCS

## 2022-04-29 PROCEDURE — U0003 INFECTIOUS AGENT DETECTION BY NUCLEIC ACID (DNA OR RNA); SEVERE ACUTE RESPIRATORY SYNDROME CORONAVIRUS 2 (SARS-COV-2) (CORONAVIRUS DISEASE [COVID-19]), AMPLIFIED PROBE TECHNIQUE, MAKING USE OF HIGH THROUGHPUT TECHNOLOGIES AS DESCRIBED BY CMS-2020-01-R: HCPCS

## 2022-04-29 PROCEDURE — 99000 SPECIMEN HANDLING OFFICE-LAB: CPT | Performed by: PATHOLOGY

## 2022-04-29 PROCEDURE — 85018 HEMOGLOBIN: CPT | Performed by: PATHOLOGY

## 2022-04-29 PROCEDURE — 36415 COLL VENOUS BLD VENIPUNCTURE: CPT | Performed by: PATHOLOGY

## 2022-04-30 LAB — SARS-COV-2 RNA RESP QL NAA+PROBE: NEGATIVE

## 2022-05-02 DIAGNOSIS — M35.02 SJOGREN'S SYNDROME WITH LUNG INVOLVEMENT (H): ICD-10-CM

## 2022-05-02 NOTE — TELEPHONE ENCOUNTER
Staff message received from ordering provider regarding Rivaroxaban (Xarelto) holding:    Ok with laura -Radha Rosa MD

## 2022-05-03 ENCOUNTER — ANESTHESIA (OUTPATIENT)
Dept: GASTROENTEROLOGY | Facility: CLINIC | Age: 82
End: 2022-05-03
Payer: MEDICARE

## 2022-05-03 ENCOUNTER — HOSPITAL ENCOUNTER (OUTPATIENT)
Facility: CLINIC | Age: 82
Discharge: HOME | End: 2022-05-03
Attending: INTERNAL MEDICINE | Admitting: INTERNAL MEDICINE
Payer: MEDICARE

## 2022-05-03 VITALS
SYSTOLIC BLOOD PRESSURE: 124 MMHG | HEIGHT: 67 IN | OXYGEN SATURATION: 96 % | HEART RATE: 60 BPM | BODY MASS INDEX: 29.58 KG/M2 | DIASTOLIC BLOOD PRESSURE: 51 MMHG | RESPIRATION RATE: 16 BRPM | WEIGHT: 188.49 LBS | TEMPERATURE: 98.4 F

## 2022-05-03 LAB
COLONOSCOPY: NORMAL
GLUCOSE BLDC GLUCOMTR-MCNC: 142 MG/DL (ref 70–99)

## 2022-05-03 PROCEDURE — 370N000017 HC ANESTHESIA TECHNICAL FEE, PER MIN: Performed by: INTERNAL MEDICINE

## 2022-05-03 PROCEDURE — 82962 GLUCOSE BLOOD TEST: CPT

## 2022-05-03 PROCEDURE — 250N000009 HC RX 250: Performed by: STUDENT IN AN ORGANIZED HEALTH CARE EDUCATION/TRAINING PROGRAM

## 2022-05-03 PROCEDURE — 43270 EGD LESION ABLATION: CPT | Performed by: INTERNAL MEDICINE

## 2022-05-03 PROCEDURE — 250N000011 HC RX IP 250 OP 636: Performed by: STUDENT IN AN ORGANIZED HEALTH CARE EDUCATION/TRAINING PROGRAM

## 2022-05-03 PROCEDURE — 45378 DIAGNOSTIC COLONOSCOPY: CPT | Performed by: INTERNAL MEDICINE

## 2022-05-03 PROCEDURE — 258N000003 HC RX IP 258 OP 636: Performed by: STUDENT IN AN ORGANIZED HEALTH CARE EDUCATION/TRAINING PROGRAM

## 2022-05-03 RX ORDER — ONDANSETRON 4 MG/1
4 TABLET, ORALLY DISINTEGRATING ORAL EVERY 30 MIN PRN
Status: DISCONTINUED | OUTPATIENT
Start: 2022-05-03 | End: 2022-05-03 | Stop reason: HOSPADM

## 2022-05-03 RX ORDER — SODIUM CHLORIDE, SODIUM LACTATE, POTASSIUM CHLORIDE, CALCIUM CHLORIDE 600; 310; 30; 20 MG/100ML; MG/100ML; MG/100ML; MG/100ML
INJECTION, SOLUTION INTRAVENOUS CONTINUOUS PRN
Status: DISCONTINUED | OUTPATIENT
Start: 2022-05-03 | End: 2022-05-03

## 2022-05-03 RX ORDER — LIDOCAINE 40 MG/G
CREAM TOPICAL
Status: DISCONTINUED | OUTPATIENT
Start: 2022-05-03 | End: 2022-05-03 | Stop reason: HOSPADM

## 2022-05-03 RX ORDER — SODIUM CHLORIDE, SODIUM LACTATE, POTASSIUM CHLORIDE, CALCIUM CHLORIDE 600; 310; 30; 20 MG/100ML; MG/100ML; MG/100ML; MG/100ML
INJECTION, SOLUTION INTRAVENOUS CONTINUOUS
Status: DISCONTINUED | OUTPATIENT
Start: 2022-05-03 | End: 2022-05-03 | Stop reason: HOSPADM

## 2022-05-03 RX ORDER — ONDANSETRON 2 MG/ML
4 INJECTION INTRAMUSCULAR; INTRAVENOUS
Status: DISCONTINUED | OUTPATIENT
Start: 2022-05-03 | End: 2022-05-03 | Stop reason: HOSPADM

## 2022-05-03 RX ORDER — PROPOFOL 10 MG/ML
INJECTION, EMULSION INTRAVENOUS CONTINUOUS PRN
Status: DISCONTINUED | OUTPATIENT
Start: 2022-05-03 | End: 2022-05-03

## 2022-05-03 RX ORDER — IPRATROPIUM BROMIDE AND ALBUTEROL SULFATE 2.5; .5 MG/3ML; MG/3ML
3 SOLUTION RESPIRATORY (INHALATION) ONCE
Status: COMPLETED | OUTPATIENT
Start: 2022-05-03 | End: 2022-05-03

## 2022-05-03 RX ORDER — ONDANSETRON 2 MG/ML
4 INJECTION INTRAMUSCULAR; INTRAVENOUS EVERY 30 MIN PRN
Status: DISCONTINUED | OUTPATIENT
Start: 2022-05-03 | End: 2022-05-03 | Stop reason: HOSPADM

## 2022-05-03 RX ADMIN — SODIUM CHLORIDE, POTASSIUM CHLORIDE, SODIUM LACTATE AND CALCIUM CHLORIDE: 600; 310; 30; 20 INJECTION, SOLUTION INTRAVENOUS at 12:54

## 2022-05-03 RX ADMIN — ONDANSETRON 4 MG: 2 INJECTION INTRAMUSCULAR; INTRAVENOUS at 14:03

## 2022-05-03 RX ADMIN — IPRATROPIUM BROMIDE AND ALBUTEROL SULFATE 3 ML: .5; 3 SOLUTION RESPIRATORY (INHALATION) at 14:04

## 2022-05-03 RX ADMIN — TOPICAL ANESTHETIC 1 SPRAY: 200 SPRAY DENTAL; PERIODONTAL at 12:56

## 2022-05-03 RX ADMIN — PROPOFOL 150 MCG/KG/MIN: 10 INJECTION, EMULSION INTRAVENOUS at 12:53

## 2022-05-03 ASSESSMENT — ENCOUNTER SYMPTOMS: DYSRHYTHMIAS: 1

## 2022-05-03 ASSESSMENT — NEW YORK HEART ASSOCIATION (NYHA) CLASSIFICATION: NYHA FUNCTIONAL CLASS: III

## 2022-05-03 NOTE — OR NURSING
EGD under MAC.  RFA performed to multiple AVM's, 48 W, 12 J.  Pt tolerated procedure.  Skin intact after grounding pad removed.  Pt tolerated procedure.

## 2022-05-03 NOTE — ANESTHESIA POSTPROCEDURE EVALUATION
Patient: Betty Tee    Procedure: Procedure(s):  COLONOSCOPY  ESOPHAGOGASTRODUODENOSCOPY, WITH LESION ABLATION       Anesthesia Type:  MAC    Note:  Disposition: Outpatient   Postop Pain Control: Uneventful            Sign Out: Well controlled pain   PONV: No   Neuro/Psych: Uneventful            Sign Out: Acceptable/Baseline neuro status   Airway/Respiratory: Uneventful            Sign Out: Acceptable/Baseline resp. status   CV/Hemodynamics: Uneventful            Sign Out: Acceptable CV status; No obvious hypovolemia; No obvious fluid overload   Other NRE: NONE   DID A NON-ROUTINE EVENT OCCUR? No           Last vitals:  Vitals Value Taken Time   BP 92/79 05/03/22 1345   Temp 36.9  C (98.4  F) 05/03/22 1345   Pulse 139 05/03/22 1345   Resp 16 05/03/22 1345   SpO2 97 % 05/03/22 1354   Vitals shown include unvalidated device data.    Electronically Signed By: BRANDI PEDROZA MD  May 3, 2022  1:55 PM

## 2022-05-03 NOTE — ANESTHESIA CARE TRANSFER NOTE
Patient: Betty Tee    Procedure: Procedure(s):  COLONOSCOPY  ESOPHAGOGASTRODUODENOSCOPY, WITH LESION ABLATION       Diagnosis: Anemia, unspecified type [D64.9]  Fecal occult blood test positive [R19.5]  Diagnosis Additional Information: No value filed.    Anesthesia Type:   MAC     Note:    Oropharynx: spontaneously breathing  Level of Consciousness: awake      Independent Airway: airway patency satisfactory and stable    Vital Signs Stable: post-procedure vital signs reviewed and stable  Report to RN Given: handoff report given  Patient transferred to: PACU  Comments: Wheezing- placed orders for albuterol inhaler   Handoff Report: Identifed the Patient, Identified the Reponsible Provider, Reviewed the pertinent medical history, Discussed the surgical course, Reviewed Intra-OP anesthesia mangement and issues during anesthesia, Set expectations for post-procedure period and Allowed opportunity for questions and acknowledgement of understanding      Vitals:  Vitals Value Taken Time   /80    Temp     Pulse 56    Resp     SpO2 95 % at RA 05/03/22 1346   Vitals shown include unvalidated device data.    Electronically Signed By: Rodriguez Messina MD  May 3, 2022  1:47 PM

## 2022-05-03 NOTE — ANESTHESIA PREPROCEDURE EVALUATION
Anesthesia Pre-Procedure Evaluation    Patient: Betty Tee   MRN: [id number] : 1940        Procedure : Procedure(s):  COLONOSCOPY  ESOPHAGOGASTRODUODENOSCOPY (EGD)          Past Medical History:   Diagnosis Date     Alcohol abuse, in remission      Allergic rhinitis, cause unspecified     allegra helps when she takes it     Antiplatelet or antithrombotic long-term use      Atrial fibrillation (H)     in hosp in  after surgery w/ fluid overload     Cardiomegaly     LVH on stress echo- cardiac w/u at [facility] ER- neg CT scan for PE, neg stress echo in      Chest pain, unspecified      Congestive heart failure (H)      Depressive disorder      Diabetes (H)      Disorder of bone and cartilage, unspecified     osteopenia (had been on prempro), improved on  dexa, stable dexa 11/10     Diverticulosis of colon (without mention of hemorrhage)     last episode yrs ago     Essential hypertension, benign      Follicular bronchiolitis (H)     associated with Sjogrens, dx by chest CT showing mosaic attenuation and air trapping     Gastro-oesophageal reflux disease      ILD (interstitial lung disease) (H)     associated with Sjogrens, also has mildly elevated IgG4, first noted on chest CT  (mild changes) and also has small airways disease; ILD improved on follow up chest CT 2018.     Insomnia, unspecified     weaned off clonazepam     Irregular heart beat      Lumbago     MRI with [facility], now seeing Dr. Cain for sciatic sx's     Major depressive disorder, recurrent episode, moderate (H)      Obstructive sleep apnea     uses cpap     Osteoarthrosis, unspecified whether generalized or localized, unspecified site      Sjogren's syndrome (H)     + RG and SSA and lip bx     Sleep apnea      Tobacco use disorder     chantix in , started again in , working      Past Surgical History:   Procedure Laterality Date     APPENDECTOMY       BACK SURGERY       BACK SURGERY       BIOPSY BREAST   09/27/2002    Biopsy Left Breast     BIOPSY BREAST Left 09/27/2002     BREAST SURGERY       CARDIAC SURGERY       CARDIAC SURGERY       CHOLECYSTECTOMY  1990's?     CHOLECYSTECTOMY       COLECTOMY LEFT  11/07/2011    Procedure:COLECTOMY LEFT; Laparoscopic mobilization of splenic flexture, sigmoid colectomy, coloprotoscopy, loop illeostomy; Surgeon:CK CASTANEDA; Location[facility]     COLECTOMY LEFT  11/07/2011     COLONOSCOPY  1990,s     ENT SURGERY       EYE SURGERY  2012     FLEXIBLE SIGMOIDOSCOPY  11/03/2011     HYSTERECTOMY TOTAL ABDOMINAL, BILATERAL SALPINGO-OOPHORECTOMY, COMBINED  11/07/2011    Procedure:COMBINED HYSTERECTOMY TOTAL ABDOMINAL, BILATERAL SALPINGO-OOPHORECTOMY; total abdominal hysterectomy, bilateral salpingo-oophorectomy; Surgeon:ALETA MANUEL; Location[facility]     HYSTERECTOMY TOTAL ABDOMINAL, BILATERAL SALPINGO-OOPHORECTOMY, COMBINED  11/07/2011     ILEOSTOMY  02/01/2012    takedown loop ileostomy      INSERT STENT URETER  11/07/2011    Procedure:INSERT STENT URETER; Placement of Bilateral Ureteral Stents ; Surgeon:PRANEETH BRYANT; Location[facility]     SIGMOIDECTOMY  02/01/2012    Dr. Castaneda, Sigmoidectomy for diverticular abscess, iliostomy afterwards until repair     SIGMOIDOSCOPY FLEXIBLE  11/03/2011    Procedure:SIGMOIDOSCOPY FLEXIBLE; Flexible Sigmoidoscopy; Surgeon:CK CASTAENDA; Location[facility]     TAKEDOWN ILEOSTOMY  02/01/2012    Procedure:TAKEDOWN ILEOSTOMY; Takedown Loop Ileostomy ; Surgeon:CK CASTANEDA; Location[facility]     URETERAL STENT PLACEMENT  11/07/2011     ZZC APPENDECTOMY  1970's?     ZZC NONSPECIFIC PROCEDURE  11/2005    exploratory abd lap, adhesions, resolved RLQ pain, diverticulitis episodes      Allergies   Allergen Reactions     Amoxicillin-Pot Clavulanate      Augmentin Nausea and Vomiting     Codeine Nausea and Vomiting     Codeine      PN: LW Reaction: HIVES     Penicillins Nausea and Vomiting     PN: LW Reaction: GI Upset     Phenobarbital Itching      Phenobarbital      Seasonal Allergies       Social History     Tobacco Use     Smoking status: Former Smoker     Packs/day: 0.50     Years: 10.00     Pack years: 5.00     Types: Cigarettes     Quit date: 8/1/2011     Years since quitting: 10.7     Smokeless tobacco: Never Used     Tobacco comment: 1/2 ppd   Substance Use Topics     Alcohol use: No     Alcohol/week: 0.0 standard drinks     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      Wt Readings from Last 1 Encounters:   04/22/22 86.2 kg (190 lb)        Anesthesia Evaluation   Pt has had prior anesthetic. Type: General.        ROS/MED HX  ENT/Pulmonary: Comment:  Sjogren's follicular bronchiolitis, chronic systemic steroid therapy  She was last seen in pulmonology on 4/22/22, at which time she was evaluated for a productive cough. Her PFT was unremarkable. She was treated with a 7 day course of prednisone 20 mg daily (ends 4/30/22), which is increased from her daily maintenance dose of 5 mg.    (+) sleep apnea, uses CPAP, allergic rhinitis,     Neurologic: Comment: RLS      Cardiovascular: Comment: A-fib ([id number] 5), chronic KIMBLE             (+) Dyslipidemia hypertension-----CHF Last EF:  HFpEF NYHA classification: III. KIMBLE. dysrhythmias, a-fib, Irregular Heartbeat/Palpitations, Previous cardiac testing   Echo: Date: 9/2021 Results:  Global and regional left ventricular function is normal with an EF of 55-60%.  The right ventricle is normal size. Global right ventricular function is  normal.  No significant valvular abnormalities.  IVC diameter >2.1 cm collapsing <50% with sniff suggests a high RA pressure  estimated at 15 mmHg or greater.  This study was compared with the study from 08/27/2020. The LA filling  pressures are higher. There are no significant changes in the biventricular  function or aortic calibers.  Stress Test: Date: Results:    ECG Reviewed: Date: 2/2022 Results:  Sinus bradycardia with 1st degree A-V block   Low voltage QRS   Borderline ECG    When compared with ECG of 03-MAY-2021 10:44,   No significant change was found   Confirmed by Wade Ruiz ([id number]) on 2/15/2022 1:16:55 PM   Cath: Date: Results:      METS/Exercise Tolerance: 1 - Eating, dressing    Hematologic:     (+) History of blood clots, anemia,     Musculoskeletal: Comment: TMJ arthralgia, osteoporosis, gout, severe right hip OA  (+) arthritis,     GI/Hepatic: Comment: hx colo-uterine fistula s/p sigmoidectomy    (+) GERD,     Renal/Genitourinary:     (+) renal disease, type: CRI,     Endo:     (+) type II DM, Using insulin, Chronic steroid usage for     Psychiatric/Substance Use:     (+) alcohol abuse     Infectious Disease:       Malignancy:       Other:      (+) , H/O Chronic Pain,        Physical Exam    Airway        Mallampati: II   TM distance: > 3 FB   Neck ROM: full   Mouth opening: > 3 cm    Respiratory Devices and Support         Dental  no notable dental history         Cardiovascular   cardiovascular exam normal          Pulmonary   pulmonary exam normal                OUTSIDE LABS:  CBC:   Lab Results   Component Value Date    WBC 10.2 03/23/2022    WBC 9.0 03/04/2022    HGB 8.5 (L) 04/29/2022    HGB 9.6 (L) 04/19/2022    HCT 36.4 03/23/2022    HCT 33.6 (L) 03/04/2022     03/23/2022     03/04/2022     BMP:   Lab Results   Component Value Date     04/22/2022     03/04/2022    POTASSIUM 3.4 04/22/2022    POTASSIUM 4.3 03/04/2022    CHLORIDE 106 04/22/2022    CHLORIDE 109 03/04/2022    CO2 26 04/22/2022    CO2 26 03/04/2022    BUN 19 04/22/2022    BUN 15 03/04/2022    CR 1.01 04/22/2022    CR 0.99 03/04/2022     (H) 04/22/2022     (H) 03/04/2022     COAGS:   Lab Results   Component Value Date    PTT 35 10/06/2011    INR 1.36 (H) 03/03/2020     POC:   Lab Results   Component Value Date     (H) 10/01/2020     HEPATIC:   Lab Results   Component Value Date    ALBUMIN 3.3 (L) 02/11/2022    PROTTOTAL 7.3 12/04/2020    ALT 17 02/11/2022     AST 15 02/11/2022    ALKPHOS 95 12/04/2020    BILITOTAL 0.6 12/04/2020     OTHER:   Lab Results   Component Value Date    PH 7.38 04/06/2017    LACT 2.3 (H) 04/06/2017    A1C 6.4 (H) 02/11/2022    CLAUDY 8.9 04/22/2022    PHOS 3.4 04/11/2017    MAG 2.0 04/11/2017    LIPASE 136 04/02/2017    TSH 2.22 12/09/2021    CRP 12.5 (H) 02/11/2022    SED 36 (H) 02/11/2022       Anesthesia Plan    ASA Status:  3   NPO Status:  NPO Appropriate    Anesthesia Type: MAC.   Induction: Propofol.   Maintenance: TIVA.        Consents            Postoperative Care       PONV prophylaxis: Background Propofol Infusion, Dexamethasone or Solumedrol     Comments:                Rodriguez Messina MD

## 2022-05-04 ENCOUNTER — TELEPHONE (OUTPATIENT)
Dept: FAMILY MEDICINE | Facility: CLINIC | Age: 82
End: 2022-05-04
Payer: MEDICARE

## 2022-05-04 DIAGNOSIS — K92.2 LOWER GI BLEED: Primary | ICD-10-CM

## 2022-05-04 DIAGNOSIS — E21.2 OTHER HYPERPARATHYROIDISM (H): ICD-10-CM

## 2022-05-04 DIAGNOSIS — R79.89 ELEVATED PARATHYROID HORMONE: ICD-10-CM

## 2022-05-04 DIAGNOSIS — E53.8 VITAMIN B12 DEFICIENCY (NON ANEMIC): ICD-10-CM

## 2022-05-04 RX ORDER — HYDROXYCHLOROQUINE SULFATE 200 MG/1
400 TABLET, FILM COATED ORAL DAILY
Qty: 180 TABLET | Refills: 3 | Status: SHIPPED | OUTPATIENT
Start: 2022-05-04 | End: 2022-07-08

## 2022-05-04 NOTE — TELEPHONE ENCOUNTER
CW,    Patient's friend Nghia called.  States patient had an  endoscopy and colonoscopy and results were good.  Few capillairies were bleeding in upper GI region - those were cauterized.    Patient has appointment to see you 5/24.  GI provider requested patient call and get order for Hgb to be checked prior to seeing you.    Nghia would like to take patient to lab on 5/23.  Need Hgb orders and anything else you would like checked.    Thanks,  Sophia Hemphill, RN      Triage note:  Call Nghia back after orders signed so she can call [facility] to schedule appointment ([phone]

## 2022-05-04 NOTE — TELEPHONE ENCOUNTER
Plaquenil      Last Written Prescription Date:  1/25/22  Last Fill Quantity: 180,   # refills: 0  Last Office Visit: 2/11/22  Future Office visit: 7/8/22  Last Eye Exam:4/15/22  Last creatinine 4/22/22  Creatinine 0.52 - 1.04 mg/dL 1.01

## 2022-05-05 ENCOUNTER — PRE VISIT (OUTPATIENT)
Dept: ORTHOPEDICS | Facility: CLINIC | Age: 82
End: 2022-05-05
Payer: MEDICARE

## 2022-05-06 ENCOUNTER — MYC MEDICAL ADVICE (OUTPATIENT)
Dept: FAMILY MEDICINE | Facility: CLINIC | Age: 82
End: 2022-05-06
Payer: MEDICARE

## 2022-05-09 DIAGNOSIS — K21.9 GASTROESOPHAGEAL REFLUX DISEASE WITHOUT ESOPHAGITIS: Primary | ICD-10-CM

## 2022-05-09 LAB — UPPER GI ENDOSCOPY: NORMAL

## 2022-05-10 DIAGNOSIS — M85.80 OSTEOPENIA, UNSPECIFIED LOCATION: ICD-10-CM

## 2022-05-10 DIAGNOSIS — J44.89 FOLLICULAR BRONCHIOLITIS (H): ICD-10-CM

## 2022-05-10 DIAGNOSIS — Z79.52 CURRENT CHRONIC USE OF SYSTEMIC STEROIDS: ICD-10-CM

## 2022-05-11 RX ORDER — AZITHROMYCIN 250 MG/1
TABLET, FILM COATED ORAL
Qty: 30 TABLET | Refills: 5 | Status: SHIPPED | OUTPATIENT
Start: 2022-05-11 | End: 2022-11-14

## 2022-05-11 NOTE — TELEPHONE ENCOUNTER
Sorry for the delay- was trying to review the EGD/colonoscopy findings from 5/3/22, and the EGD final report was just completed on 5/9/22 - now able to review.  Colonoscopy looked okay.    **Helpful, significant findings on the EGD (see report impression below).... Found source of bleeding, and ablated the area.   Hopefully this will improve her anemia.  He recommended pantoprazole 40mg/d, but she was already on that medication (it was restarted on 3/7/22- see TE from 3/4/22) He also rec EGD recheck in 4-6 wks- do not see that scheduled.      Triage- can you try and reach them with this information and recs?  --*Would rec lab check sooner than 5/23- would see if she could do closer to 5/17/22 (or at least that week), and that way we'd have more of the labs back at her 5/24/22 appt here.  --Will place orders for anemia and follow-up parathyroid labs to be done.  --Could you help make sure she's getting scheduled for the follow-up EGD?    Thank you!!!  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 Treated with                   radiofrequency ablation.   Recommendation:          - Will proceed with colonoscopy today.   - To monitor clinical course and re-evaluate Hgb in 2 wks   - Will recommend an upper endoscopy to re-evaluate the  stomach in 4-6 weeks   - Will recommend to take pantoprazole 40 mg PO daily    for 8 weeks (30 mins before she eats) to accelerate healing of post RFA ulcers

## 2022-05-11 NOTE — TELEPHONE ENCOUNTER
Left message for patient's friend Nghia to call [facility] back  When patient calls back please transfer to an RN  Lydia HALEY RN

## 2022-05-12 ENCOUNTER — TELEPHONE (OUTPATIENT)
Dept: GASTROENTEROLOGY | Facility: CLINIC | Age: 82
End: 2022-05-12
Payer: MEDICARE

## 2022-05-12 RX ORDER — ALENDRONATE SODIUM 70 MG/1
70 TABLET ORAL
Qty: 12 TABLET | Refills: 0 | Status: SHIPPED | OUTPATIENT
Start: 2022-05-12 | End: 2022-08-16

## 2022-05-12 NOTE — TELEPHONE ENCOUNTER
Nghia called back   Informed of below  Is on top of scheduling another EGD - doesn't need help  Lydia HALEY RN

## 2022-05-12 NOTE — TELEPHONE ENCOUNTER
Screening Questions  BlueKIND OF PREP RedLOCATION [review exclusion criteria] GreenSEDATION TYPE  1. Have you had a positive covid test in the last 90 days? N     2. Do you have a legal guardian or medical Power of ?Y- TERI  Are you able to give consent for your medical care?Y (Sedation review/consideration needed)    3. Are you active on mychart? Y    4. What insurance is in the chart? MEDICARE & BCBS     3.   Ordering/Referring Provider:     4. BMI 31 [BMI OVER 40-EXTENDED PREP]  If greater than 40 review exclusion criteria [PAC APPT IF @ [facility]]        5.  Respiratory Screening :  [If yes to any of the following HOSPITAL setting only]     Do you use daily home oxygen? HAS- USING @HOME INTERMITTENTLY  - LEVEL 2  Do you have mod to severe Obstructive Sleep Apnea? Y- CPAP  [OKAY @ [facility] UP SH PH RI]   Do you have Pulmonary Hypertension?   Y   Do you have UNCONTROLLED asthma? N        6.   Have you had a heart or lung transplant? N      7.   Are you currently on dialysis? N [ If yes, G-PREP & HOSPITAL setting only]     8.   Do you have chronic kidney disease? Y [ If yes, G-PREP ]    9.   Have you had a stroke or Transient ischemic attack (TIA - aka  mini stroke ) within 6 months?  N (If yes, please review exclusion criteria)    10.   In the past 6 months, have you had any heart related issues including cardiomyopathy or heart attack? N - AFIB          If yes, did it require cardiac stenting or other implantable device?       11.   Do you have any implantable devices in your body (pacemaker, defib, LVAD)? N (If yes, please review exclusion criteria)    12.   Do you take nitroglycerin? N           If yes, how often?   (if yes, HOSPITAL setting ONLY)    13.   Are you currently taking any blood thinners? Y - XARELTO           [IF YES, INFORM PATIENT TO FOLLOW UP W/ ORDERING PROVIDER FOR BRIDGING INSTRUCTIONS]     14.   Do you have a diagnosis of diabetes? Y   [ If yes, G-PREP ]    15.   [FEMALES] Are you  currently pregnant?     If yes, how many weeks?     16.   Are you taking any prescription pain medications on a routine schedule?  N (INTERMITTENT) [ If yes, EXTENDED PREP.] [If yes, MAC]    17.   Do you have any chemical dependencies such as alcohol, street drugs, or methadone?  N [If yes, MAC]    18.   Do you have any history of post-traumatic stress syndrome, severe anxiety or history of psychosis?  N  [If yes, MAC]    19.   Do you transfer independently?  Y, USES A WALKER    20.  On a regular basis do you go 3-5 days between bowel movements?    [ If yes, EXTENDED PREP.]    21.   Preferred LOCAL Pharmacy for Pre Prescription   [id number] - [address]      Scheduling Details      Caller : TERI WILKINS   [phone]  (Please ask for phone number if not scheduled by patient)    Type of Procedure Scheduled: EGD  Which Colonoscopy Prep was Sent?:   KARINE CF PATIENTS & GROEN'S PATIENTS NEEDS EXTENDED PREP  Surgeon:   Date of Procedure: 6/7/22  Location: [facility]      Sedation Type: MAC  Conscious Sedation- Needs  for 6 hours after the procedure  MAC/General-Needs  for 24 hours after procedure    Pre-op Required at [facility] [place] [place] and OR for MAC sedation: Y  (advise patient they will need a pre-op prior to procedure -)      Informed patient they will need an adult  Y  Cannot take any type of public or medical transportation alone    Pre-Procedure Covid test to be completed at [facility] or Externally: 6/4 @[facility]    Confirmed Nurse will call to complete assessment Y    Additional comments: PLEASE CALL TERI WILKINS FOR PROCEDURE PA @[phone]

## 2022-05-12 NOTE — TELEPHONE ENCOUNTER
ALENDRONATE SODIUM 70 MG TAB     Last Written Prescription Date:  2/16/22  Last Fill Quantity: 12,   # refills: 0  Last Office Visit : 2/11/22  Future Office visit:  7/8/22    Routing refill request to provider for review/approval because:  Overdue DEXA (last DEXA 8/20/19)  Thank-you, Johanna VILLAFANA RN Medication Refill Team

## 2022-05-13 NOTE — TELEPHONE ENCOUNTER
FUTURE VISIT INFORMATION      SURGERY INFORMATION:    Date: 22    [facility]    Surgeon:  Guru Winsome Dias MD    Anesthesia Type:  MAC    Procedure: ESOPHAGOGASTRODUODENOSCOPY (EGD)    RECORDS REQUESTED FROM:        Primary Care Provider: Ilene Tristan MD  - [facility]    Pertinent Medical History: ANGELICA, paroxysmal atrial fibrillation, hypertension, left ventricular hypertrophy, HFpEF, Heart failure    Most recent EKG+ Tracin22    Most recent ECHO: 21    Most recent PFT's: 22

## 2022-05-16 ENCOUNTER — LAB (OUTPATIENT)
Dept: LAB | Facility: CLINIC | Age: 82
End: 2022-05-16
Payer: MEDICARE

## 2022-05-16 DIAGNOSIS — G57.01 PIRIFORMIS SYNDROME, RIGHT: ICD-10-CM

## 2022-05-16 DIAGNOSIS — E21.2 OTHER HYPERPARATHYROIDISM (H): ICD-10-CM

## 2022-05-16 DIAGNOSIS — G89.29 OTHER CHRONIC PAIN: ICD-10-CM

## 2022-05-16 DIAGNOSIS — M16.11 OSTEOARTHRITIS OF RIGHT HIP, UNSPECIFIED OSTEOARTHRITIS TYPE: ICD-10-CM

## 2022-05-16 DIAGNOSIS — K92.2 LOWER GI BLEED: ICD-10-CM

## 2022-05-16 DIAGNOSIS — E53.8 VITAMIN B12 DEFICIENCY (NON ANEMIC): ICD-10-CM

## 2022-05-16 DIAGNOSIS — R79.89 ELEVATED PARATHYROID HORMONE: ICD-10-CM

## 2022-05-16 LAB
ANION GAP SERPL CALCULATED.3IONS-SCNC: 6 MMOL/L (ref 3–14)
BUN SERPL-MCNC: 19 MG/DL (ref 7–30)
CALCIUM SERPL-MCNC: 9.2 MG/DL (ref 8.5–10.1)
CHLORIDE BLD-SCNC: 108 MMOL/L (ref 94–109)
CO2 SERPL-SCNC: 26 MMOL/L (ref 20–32)
CREAT SERPL-MCNC: 1.02 MG/DL (ref 0.52–1.04)
ERYTHROCYTE [DISTWIDTH] IN BLOOD BY AUTOMATED COUNT: 16.2 % (ref 10–15)
FERRITIN SERPL-MCNC: 57 NG/ML (ref 8–252)
GFR SERPL CREATININE-BSD FRML MDRD: 55 ML/MIN/1.73M2
GLUCOSE BLD-MCNC: 169 MG/DL (ref 70–99)
HCT VFR BLD AUTO: 32.5 % (ref 35–47)
HGB BLD-MCNC: 9.5 G/DL (ref 11.7–15.7)
MCH RBC QN AUTO: 28.9 PG (ref 26.5–33)
MCHC RBC AUTO-ENTMCNC: 29.2 G/DL (ref 31.5–36.5)
MCV RBC AUTO: 99 FL (ref 78–100)
PLATELET # BLD AUTO: 270 10E3/UL (ref 150–450)
POTASSIUM BLD-SCNC: 4.1 MMOL/L (ref 3.4–5.3)
PTH-INTACT SERPL-MCNC: 72 PG/ML (ref 15–65)
RBC # BLD AUTO: 3.29 10E6/UL (ref 3.8–5.2)
SODIUM SERPL-SCNC: 140 MMOL/L (ref 133–144)
VIT B12 SERPL-MCNC: 819 PG/ML (ref 193–986)
WBC # BLD AUTO: 7.9 10E3/UL (ref 4–11)

## 2022-05-16 PROCEDURE — 82306 VITAMIN D 25 HYDROXY: CPT | Performed by: FAMILY MEDICINE

## 2022-05-16 PROCEDURE — 82607 VITAMIN B-12: CPT | Performed by: PATHOLOGY

## 2022-05-16 PROCEDURE — 36415 COLL VENOUS BLD VENIPUNCTURE: CPT | Performed by: PATHOLOGY

## 2022-05-16 PROCEDURE — 82728 ASSAY OF FERRITIN: CPT | Performed by: PATHOLOGY

## 2022-05-16 PROCEDURE — 80048 BASIC METABOLIC PNL TOTAL CA: CPT | Performed by: PATHOLOGY

## 2022-05-16 PROCEDURE — 85027 COMPLETE CBC AUTOMATED: CPT | Performed by: PATHOLOGY

## 2022-05-16 PROCEDURE — 83970 ASSAY OF PARATHORMONE: CPT | Performed by: PATHOLOGY

## 2022-05-16 RX ORDER — HYDROCODONE BITARTRATE AND ACETAMINOPHEN 7.5; 325 MG/1; MG/1
1 TABLET ORAL EVERY 12 HOURS
Qty: 60 TABLET | Refills: 0 | Status: SHIPPED | OUTPATIENT
Start: 2022-05-16 | End: 2022-06-28

## 2022-05-16 RX ORDER — METHOCARBAMOL 750 MG/1
750 TABLET, FILM COATED ORAL 3 TIMES DAILY PRN
Qty: 90 TABLET | Refills: 3 | Status: SHIPPED | OUTPATIENT
Start: 2022-05-16

## 2022-05-16 NOTE — TELEPHONE ENCOUNTER
reviewed, norco and methocarbamol refills approved.        Charly Moore [facility]

## 2022-05-16 NOTE — TELEPHONE ENCOUNTER
Routing to provider to review medication prepped per below      Norco 7.5-325, #60, Refill:no  Sig:OK to fill and start 05/16/22  Last picked up 03/31/22 with start on 03/31/22  Due Anytime    Per last OV note 04/12/22:    1. Medication Management:   1. Continue Norco 7.5-325mg BID prn.    1. Follow up: As needed.    Claudia LANDON, RN Care Coordinator  [facility]  Pain [facility]

## 2022-05-16 NOTE — TELEPHONE ENCOUNTER
Reason for call:  Medication   If this is a refill request, has the caller requested the refill from the pharmacy already? No  Will the patient be using a [place] [facility]? No  Name of the pharmacy and phone number for the current request:   [id number] - [place], [place] - [address]  Name of the medication requested:   methocarbamol (ROBAXIN) 750 MG tablet    HYDROcodone-acetaminophen (NORCO) 7.5-325 MG per tablet        Phone number to reach patient:  Home number on file [phone] (home)  Can we leave a detailed message on this number?  YES    Travel screening: Not Applicable          Jovanni Arora    [facility] [place]

## 2022-05-16 NOTE — TELEPHONE ENCOUNTER
Received call from patient requesting refill(s) of HYDROcodone-acetaminophen (NORCO) 7.5-325 MG per tablet    Last dispensed from pharmacy on 03/31/2022    Received call from patient requesting refill(s) of methocarbamol (ROBAXIN) 750 MG tablet   Last dispensed from pharmacy on 03/30/2022    Patient's last office/virtual visit by prescribing provider on 04/12/2022  Next office/virtual appointment scheduled for NA    Last urine drug screen date 09/02/2021  Current opioid agreement on file (completed within the last year) Yes Date of opioid agreement: 10/19/2021    E-prescribe to [id number] - [place], [place] - [address]    Will route to [facility] for review and preparation of prescription(s).     Dyan Cantrell MA  [facility]

## 2022-05-17 LAB — DEPRECATED CALCIDIOL+CALCIFEROL SERPL-MC: 38 UG/L (ref 20–75)

## 2022-05-17 NOTE — TELEPHONE ENCOUNTER
Spoke to patient, notified that prescription was sent to preferred pharmacy.    Able to fill  and start 05/16/22      Zayra Christy MA  [facility] [place]

## 2022-05-18 DIAGNOSIS — J30.1 SEASONAL ALLERGIC RHINITIS DUE TO POLLEN: ICD-10-CM

## 2022-05-18 RX ORDER — FLUTICASONE PROPIONATE 50 MCG
1-2 SPRAY, SUSPENSION (ML) NASAL DAILY PRN
Qty: 16 ML | Refills: 11 | Status: SHIPPED | OUTPATIENT
Start: 2022-05-18

## 2022-05-18 NOTE — PROGRESS NOTES
Assessment & Plan       ICD-10-CM    1. Gastric antral vascular ectasia  K31.819    2. Fatigue, unspecified type  R53.83 CBC with platelets     Vitamin D Deficiency   3. SOB (shortness of breath)  R06.02    4. Chronic heart failure with preserved ejection fraction (H)  I50.32    5. Insomnia due to medical condition  G47.01 traZODone (DESYREL) 50 MG tablet   6. Low back pain radiating to left leg  M54.50     M79.605    7. Follicular bronchiolitis (H)  J42    8. Type 2 diabetes mellitus with hyperglycemia, with long-term current use of insulin (H)  E11.65 Hemoglobin A1c    Z79.4 Comprehensive metabolic panel (BMP + Alb, Alk Phos, ALT, AST, Total. Bili, TP)   9. Essential hypertension with goal blood pressure less than 140/90  I10 Comprehensive metabolic panel (BMP + Alb, Alk Phos, ALT, AST, Total. Bili, TP)   10. Elevated parathyroid hormone  E34.9 Vitamin D Deficiency     Parathyroid Hormone Intact   11. Vitamin D deficiency  E55.9 Cholecalciferol (VITAMIN D3) 50 MCG (2000 UT) CAPS     Vitamin D Deficiency     Parathyroid Hormone Intact     Comprehensive metabolic panel (BMP + Alb, Alk Phos, ALT, AST, Total. Bili, TP)      GI bleed/SOB- capillary bleeding found and cauterized on recent EGD, likely cause for anemia.  Pt reports stools improved soon afterwards, but unfortunately noted they were dark again today. Does feel more SOB walking in to clinic.  Will recheck CBC with tomorrow's lab draw at cardiology.  ((Pt has pre-op prior to EGD again through other clinic. Discussed if they are doing the pre-op assessment, they should do the DM/insulin recommendations (in conjunction with [facility] recs).  If not, next time pre-op should be scheduled here.))    CHF/SOB/Follicular bronchiolitis- Had recent appt with pulmonary, was acutely sick at the time, but PFT's noted to be stable.  Will cont follow-up with pulmonary and cardiology.     Insomnia- worse sx's lately despite taking trazodone 100mg at night.    She is not able to  get to sleep for many hrs.  Occasional 30 minute naps during day.  Rec increasing trazodone to 150mg at night.  Rec going to bed/sleep ~1 hr later than her usual.  Avoid naps.  If sleeping better, can move up bedtime again.    Left hip/back pain with radiation down left thigh- very disappointing for her because the last injection was finally so very helpful for her R hip pain sx's.  Left hip/leg sx's started the very next day.  These have been a bit better with exercises, and will plan to follow-up with a new provider at the pain clinic as Dr. Moore is moving away.    DM II/HTN- She think things have been good/stable with glucose/DM/BPs.    Will recheck A1C at tomorrow labs.    Labs- PTH improved from 127 a month ago to 72 last week.  Vit D stable at 38.  Will have her double her Vit D to 4000 units/day, and recheck both labs in ~3 months.    Return in about 3 months (around 8/24/2022) for Routine Visit/Chronic Cares/Med Check.      50 minutes spent on the date of the encounter doing chart review, review of test results, interpretation of tests, patient visit and documentation       Ilene Tristan MD  [facility]          Subjective   Sister Betty is a 82 year old who presents for the following health issues - multiple issues    History of Present Illness       Back Pain:  She presents for follow up of back pain. Patient's back pain is a chronic problem.  Location of back pain:  Right hip and left hip  Description of back pain: shooting and stabbing  Back pain spreads: left buttocks and left thigh    Since patient first noticed back pain, pain is: always present, but gets better and worse  Does back pain interfere with her job:  Not applicable      Mental Health Follow-up:  Patient presents to follow-up on Depression.Patient's depression since last visit has been:  Medium  The patient is not having other symptoms associated with depression.      Any significant life events: No  Patient  is not feeling anxious or having panic attacks.  Patient has no concerns about alcohol or drug use.    Diabetes:   She presents for follow up of diabetes.  She is checking home blood glucose three times daily. She checks blood glucose before meals.  Blood glucose is sometimes over 200 and never under 70. She is aware of hypoglycemia symptoms including shakiness, dizziness and weakness. She has no concerns regarding her diabetes at this time.  She is having burning in feet and excessive thirst.         Heart Failure:  She presents for follow up of heart failure. She is experiencing shortness of breath with activity only, which is same as usual. She is experiencing lower extremity edema which is same as usual. She denies orthopenea and coughs at night. Patient is not checking weight daily. She has recently had a None. She has side effects from medications including dizziness, fatigue and cough. She has had no other medical visits for heart failure since the last visit.    Hypertension: She presents for follow up of hypertension.  She does not check blood pressure  regularly outside of the clinic. Outside blood pressures have been over 140/90. She does not follow a low salt diet.      Today's PHQ-9         PHQ-9 Total Score: 8    PHQ-9 Q9 Thoughts of better off dead/self-harm past 2 weeks :   Not at all    How difficult have these problems made it for you to do your work, take care of things at home, or get along with other people: Somewhat difficult     Anemia-   Had EGD and colonoscopy- found capillaries that were bleeding, and cauterized them.  Sometimes needs a 2-3 times.  6/7/22- has another EGD scheduled. Has pre-op scheduled.  Stools- had been black, then after went brown, but black again today.  Second endoscopy 6/7/    Getting really tired again-     Hasn't been sleeping- trazodone does not work for her.  Asked the pharmacist- said melatonin, didn't work.  Going nuts.  Trazodone- taking 2 tabs at night,  "100mg.  Goes to bed ~10pm, so tired, but can't get to sleep, this am, didn't sleep until 4:30am.  Trying not to nap during the day.  Ends up napping 30 min 2x/wk.    Pain clinic-  Dr. Moore- he got the injection in the right place last time- hurt like crazy, but it worked, really helped the R hip pain.  Next day, twisted wrong, and got her left hip- ~4/13/22.  R hip is still good.  The left hip/back really hurts, right in the joint and down right lateral leg.   It is better than it was initally 20% better.  Takes one pain tablet rarely now, really wants to avoid them, though pain was so bad she was using them daily right after she hurt her left hip.  Took one this past Sunday, but not Mon or Tues.  Has muscle relaxant- uses those rarely.    DM II- thinks it's about the same, good control, no issues with medications.  BP- looks pretty good.    Pulmonary follow-up - 1-2 weeks ago.  Had bronchitis at the time.  Put her on increased prednsione 20mg/d for a week, now back to 5mg/d.    Cardiology- appt tomorrow      BP Readings from Last 2 Encounters:   05/25/22 116/69   05/24/22 130/72     Hemoglobin A1C POCT (%)   Date Value   06/04/2021 7.1 (H)   03/03/2021 8.0 (H)     Hemoglobin A1C (%)   Date Value   05/25/2022 5.9 (H)   02/11/2022 6.4 (H)     LDL Cholesterol Calculated (mg/dL)   Date Value   12/09/2021 16   10/20/2020 19   01/21/2020 1         Review of Systems   Constitutional, HEENT, cardiovascular, pulmonary, gi and gu systems are negative, except as otherwise noted.      Objective    /72   Pulse 67   Temp 97.5  F (36.4  C) (Temporal)   Resp 16   Ht 1.702 m (5' 7\")   Wt 84.8 kg (187 lb)   SpO2 100%   BMI 29.29 kg/m    Body mass index is 29.29 kg/m .  Physical Exam   GENERAL APPEARANCE: healthy, alert and no distress     EYES: sclera clear, EOMI     RESP: lungs clear to auscultation - no rales, rhonchi or wheezes     CV: regular rates and rhythm, normal S1 S2, no S3 or S4 and no murmur, click or rub "      Ext: warm, dry, trace edema       Psych: full range affect, normal speech and grooming, judgement and insight intact     Lab on 05/16/2022   Component Date Value Ref Range Status     WBC Count 05/16/2022 7.9  4.0 - 11.0 10e3/uL Final     RBC Count 05/16/2022 3.29 (A) 3.80 - 5.20 10e6/uL Final     Hemoglobin 05/16/2022 9.5 (A) 11.7 - 15.7 g/dL Final     Hematocrit 05/16/2022 32.5 (A) 35.0 - 47.0 % Final     MCV 05/16/2022 99  78 - 100 fL Final     MCH 05/16/2022 28.9  26.5 - 33.0 pg Final     MCHC 05/16/2022 29.2 (A) 31.5 - 36.5 g/dL Final     RDW 05/16/2022 16.2 (A) 10.0 - 15.0 % Final     Platelet Count 05/16/2022 270  150 - 450 10e3/uL Final     Ferritin 05/16/2022 57  8 - 252 ng/mL Final     Parathyroid Hormone Intact 05/16/2022 72 (A) 15 - 65 pg/mL Final     Vitamin D, Total (25-Hydroxy) 05/16/2022 38  20 - 75 ug/L Final     Sodium 05/16/2022 140  133 - 144 mmol/L Final     Potassium 05/16/2022 4.1  3.4 - 5.3 mmol/L Final     Chloride 05/16/2022 108  94 - 109 mmol/L Final     Carbon Dioxide (CO2) 05/16/2022 26  20 - 32 mmol/L Final     Anion Gap 05/16/2022 6  3 - 14 mmol/L Final     Urea Nitrogen 05/16/2022 19  7 - 30 mg/dL Final     Creatinine 05/16/2022 1.02  0.52 - 1.04 mg/dL Final     Calcium 05/16/2022 9.2  8.5 - 10.1 mg/dL Final     Glucose 05/16/2022 169 (A) 70 - 99 mg/dL Final     GFR Estimate 05/16/2022 55 (A) >60 mL/min/1.73m2 Final    Effective December 21, 2021 eGFRcr in adults is calculated using the 2021 CKD-EPI creatinine equation which includes age and gender (Angelita et al., NEJM, DOI: 10.1056/JZNZfv3274738)     Vitamin B12 05/16/2022 819  193 - 986 pg/mL Final

## 2022-05-24 ENCOUNTER — OFFICE VISIT (OUTPATIENT)
Dept: FAMILY MEDICINE | Facility: CLINIC | Age: 82
End: 2022-05-24
Payer: MEDICARE

## 2022-05-24 VITALS
DIASTOLIC BLOOD PRESSURE: 72 MMHG | SYSTOLIC BLOOD PRESSURE: 130 MMHG | WEIGHT: 187 LBS | BODY MASS INDEX: 29.35 KG/M2 | OXYGEN SATURATION: 100 % | TEMPERATURE: 97.5 F | HEIGHT: 67 IN | RESPIRATION RATE: 16 BRPM | HEART RATE: 67 BPM

## 2022-05-24 DIAGNOSIS — G47.01 INSOMNIA DUE TO MEDICAL CONDITION: ICD-10-CM

## 2022-05-24 DIAGNOSIS — Z79.4 TYPE 2 DIABETES MELLITUS WITH HYPERGLYCEMIA, WITH LONG-TERM CURRENT USE OF INSULIN (H): ICD-10-CM

## 2022-05-24 DIAGNOSIS — R06.02 SOB (SHORTNESS OF BREATH): ICD-10-CM

## 2022-05-24 DIAGNOSIS — J44.89 FOLLICULAR BRONCHIOLITIS (H): ICD-10-CM

## 2022-05-24 DIAGNOSIS — M79.605 LOW BACK PAIN RADIATING TO LEFT LEG: ICD-10-CM

## 2022-05-24 DIAGNOSIS — E11.65 TYPE 2 DIABETES MELLITUS WITH HYPERGLYCEMIA, WITH LONG-TERM CURRENT USE OF INSULIN (H): ICD-10-CM

## 2022-05-24 DIAGNOSIS — R53.83 FATIGUE, UNSPECIFIED TYPE: ICD-10-CM

## 2022-05-24 DIAGNOSIS — E55.9 VITAMIN D DEFICIENCY: ICD-10-CM

## 2022-05-24 DIAGNOSIS — R79.89 ELEVATED PARATHYROID HORMONE: ICD-10-CM

## 2022-05-24 DIAGNOSIS — I10 ESSENTIAL HYPERTENSION WITH GOAL BLOOD PRESSURE LESS THAN 140/90: ICD-10-CM

## 2022-05-24 DIAGNOSIS — K31.819 GASTRIC ANTRAL VASCULAR ECTASIA: Primary | ICD-10-CM

## 2022-05-24 DIAGNOSIS — M54.50 LOW BACK PAIN RADIATING TO LEFT LEG: ICD-10-CM

## 2022-05-24 DIAGNOSIS — I50.32 CHRONIC HEART FAILURE WITH PRESERVED EJECTION FRACTION (H): ICD-10-CM

## 2022-05-24 PROCEDURE — 99215 OFFICE O/P EST HI 40 MIN: CPT | Performed by: FAMILY MEDICINE

## 2022-05-24 RX ORDER — TRAZODONE HYDROCHLORIDE 50 MG/1
150 TABLET, FILM COATED ORAL
Qty: 135 TABLET | Refills: 1 | Status: SHIPPED | OUTPATIENT
Start: 2022-05-24 | End: 2022-10-25

## 2022-05-24 RX ORDER — ACETAMINOPHEN 160 MG
100 TABLET,DISINTEGRATING ORAL DAILY
Qty: 90 CAPSULE | Refills: 1 | Status: SHIPPED | OUTPATIENT
Start: 2022-05-24 | End: 2022-08-29

## 2022-05-24 ASSESSMENT — PATIENT HEALTH QUESTIONNAIRE - PHQ9
SUM OF ALL RESPONSES TO PHQ QUESTIONS 1-9: 8
SUM OF ALL RESPONSES TO PHQ QUESTIONS 1-9: 8
10. IF YOU CHECKED OFF ANY PROBLEMS, HOW DIFFICULT HAVE THESE PROBLEMS MADE IT FOR YOU TO DO YOUR WORK, TAKE CARE OF THINGS AT HOME, OR GET ALONG WITH OTHER PEOPLE: SOMEWHAT DIFFICULT

## 2022-05-24 NOTE — NURSING NOTE
"Chief Complaint   Patient presents with     RECHECK     initial /72   Pulse 67   Temp 97.5  F (36.4  C) (Temporal)   Resp 16   Ht 1.702 m (5' 7\")   Wt 84.8 kg (187 lb)   SpO2 100%   BMI 29.29 kg/m   Estimated body mass index is 29.29 kg/m  as calculated from the following:    Height as of this encounter: 1.702 m (5' 7\").    Weight as of this encounter: 84.8 kg (187 lb).  BP completed using cuff size: regular.   R arm      Health Maintenance that is potentially due pending provider review:  NONE    n/a    Taco Parra ma  "

## 2022-05-25 ENCOUNTER — LAB (OUTPATIENT)
Dept: LAB | Facility: CLINIC | Age: 82
End: 2022-05-25
Payer: MEDICARE

## 2022-05-25 ENCOUNTER — OFFICE VISIT (OUTPATIENT)
Dept: CARDIOLOGY | Facility: CLINIC | Age: 82
End: 2022-05-25
Attending: INTERNAL MEDICINE
Payer: MEDICARE

## 2022-05-25 VITALS
DIASTOLIC BLOOD PRESSURE: 69 MMHG | SYSTOLIC BLOOD PRESSURE: 116 MMHG | BODY MASS INDEX: 30.17 KG/M2 | WEIGHT: 187.7 LBS | HEART RATE: 62 BPM | HEIGHT: 66 IN | OXYGEN SATURATION: 99 %

## 2022-05-25 DIAGNOSIS — I50.32 CHRONIC HEART FAILURE WITH PRESERVED EJECTION FRACTION (H): ICD-10-CM

## 2022-05-25 DIAGNOSIS — R53.83 FATIGUE, UNSPECIFIED TYPE: ICD-10-CM

## 2022-05-25 DIAGNOSIS — I50.32 CHRONIC HEART FAILURE WITH PRESERVED EJECTION FRACTION (H): Primary | ICD-10-CM

## 2022-05-25 DIAGNOSIS — E11.65 TYPE 2 DIABETES MELLITUS WITH HYPERGLYCEMIA, WITH LONG-TERM CURRENT USE OF INSULIN (H): ICD-10-CM

## 2022-05-25 DIAGNOSIS — Z79.4 TYPE 2 DIABETES MELLITUS WITH HYPERGLYCEMIA, WITH LONG-TERM CURRENT USE OF INSULIN (H): ICD-10-CM

## 2022-05-25 LAB
ANION GAP SERPL CALCULATED.3IONS-SCNC: 9 MMOL/L (ref 3–14)
BUN SERPL-MCNC: 21 MG/DL (ref 7–30)
CALCIUM SERPL-MCNC: 8.9 MG/DL (ref 8.5–10.1)
CHLORIDE BLD-SCNC: 108 MMOL/L (ref 94–109)
CO2 SERPL-SCNC: 24 MMOL/L (ref 20–32)
CREAT SERPL-MCNC: 1.06 MG/DL (ref 0.52–1.04)
ERYTHROCYTE [DISTWIDTH] IN BLOOD BY AUTOMATED COUNT: 16 % (ref 10–15)
GFR SERPL CREATININE-BSD FRML MDRD: 52 ML/MIN/1.73M2
GLUCOSE BLD-MCNC: 124 MG/DL (ref 70–99)
HBA1C MFR BLD: 5.9 % (ref 0–5.6)
HCT VFR BLD AUTO: 30.6 % (ref 35–47)
HGB BLD-MCNC: 9.2 G/DL (ref 11.7–15.7)
MCH RBC QN AUTO: 29.4 PG (ref 26.5–33)
MCHC RBC AUTO-ENTMCNC: 30.1 G/DL (ref 31.5–36.5)
MCV RBC AUTO: 98 FL (ref 78–100)
NT-PROBNP SERPL-MCNC: 320 PG/ML (ref 0–1800)
PLATELET # BLD AUTO: 271 10E3/UL (ref 150–450)
POTASSIUM BLD-SCNC: 4.2 MMOL/L (ref 3.4–5.3)
RBC # BLD AUTO: 3.13 10E6/UL (ref 3.8–5.2)
SODIUM SERPL-SCNC: 141 MMOL/L (ref 133–144)
WBC # BLD AUTO: 12.1 10E3/UL (ref 4–11)

## 2022-05-25 PROCEDURE — 85027 COMPLETE CBC AUTOMATED: CPT | Performed by: PATHOLOGY

## 2022-05-25 PROCEDURE — 36415 COLL VENOUS BLD VENIPUNCTURE: CPT | Performed by: PATHOLOGY

## 2022-05-25 PROCEDURE — 83036 HEMOGLOBIN GLYCOSYLATED A1C: CPT | Performed by: PATHOLOGY

## 2022-05-25 PROCEDURE — 99215 OFFICE O/P EST HI 40 MIN: CPT | Performed by: INTERNAL MEDICINE

## 2022-05-25 PROCEDURE — G0463 HOSPITAL OUTPT CLINIC VISIT: HCPCS

## 2022-05-25 PROCEDURE — 83880 ASSAY OF NATRIURETIC PEPTIDE: CPT | Performed by: PATHOLOGY

## 2022-05-25 PROCEDURE — 80048 BASIC METABOLIC PNL TOTAL CA: CPT | Performed by: PATHOLOGY

## 2022-05-25 ASSESSMENT — PAIN SCALES - GENERAL: PAINLEVEL: NO PAIN (0)

## 2022-05-25 NOTE — PATIENT INSTRUCTIONS
Cardiology Providers you saw during your visit:  Dr. Rosa    Medication changes:   No changes    Follow up:   Dr Montoya is going to move up your scope- his team will call you for the new date.    Stop your blood thinner Friday  If you are having a lot of black stool and feeling crummy go to the ER (the  ER)  Dr Rosa will be contacting her colleagues who do Watchman procedures.   That team will be contacting you for any scheduling needs.  This could help prevent strokes while being off of blood thinners.  (This is the procedure where the little pouch would be blocked off so clots don't form)   When you know when the procedure is, you can call the [facility] testing line, [phone].  Confirm with Dr Montoya's team, you can confirm the insulin dose with the pre-op team when they call you.    Follow up with Dr Rosa in 4-5 months  Labs:   Latest Reference Range & Units 05/25/22 14:55   Sodium 133 - 144 mmol/L 141   Potassium 3.4 - 5.3 mmol/L 4.2   Chloride 94 - 109 mmol/L 108   Carbon Dioxide 20 - 32 mmol/L 24   Urea Nitrogen 7 - 30 mg/dL 21   Creatinine 0.52 - 1.04 mg/dL 1.06 (H)   GFR Estimate >60 mL/min/1.73m2 52 (L) [1]   Calcium 8.5 - 10.1 mg/dL 8.9   Anion Gap 3 - 14 mmol/L 9   Hemoglobin A1C 0.0 - 5.6 % 5.9 (H) [2]   N-Terminal Pro Bnp 0 - 1,800 pg/mL 320 [3]   Glucose 70 - 99 mg/dL 124 (H)   WBC 4.0 - 11.0 10e3/uL 12.1 (H)   Hemoglobin 11.7 - 15.7 g/dL 9.2 (L)   Hematocrit 35.0 - 47.0 % 30.6 (L)   Platelet Count 150 - 450 10e3/uL 271   RBC Count 3.80 - 5.20 10e6/uL 3.13 (L)   MCV 78 - 100 fL 98   MCH 26.5 - 33.0 pg 29.4   MCHC 31.5 - 36.5 g/dL 30.1 (L)   RDW 10.0 - 15.0 % 16.0 (H)           Please call if you have :  1. Weight gain of more than 2 pounds in a day or 5 pounds in a week  2. Increased shortness of breath, swelling or bloating  3. Dizziness, lightheadedness   4. Any questions or concerns.       Follow the American Heart Association Diet and Lifestyle recommendations:  Limit saturated fat, trans  "fat, sodium, red meat, sweets and sugar-sweetened beverages. If you choose to eat red meat, compare labels and select the leanest cuts available.  Aim for at least 150 minutes of moderate physical activity or 75 minutes of vigorous physical activity - or an equal combination of both - each week.      During business hours: [phone], press option # 1 to schedule and leave a message for your care team.        After hours, weekends or holidays: On Call Cardiologist- [phone]   option #4 and ask to speak to the on-call Cardiologist. Inform them you are a CORE/heart failure patient at the [facility].      Heart Failure Support Group  Virtual meetings 2022, , 1-2 p.m.  Monday, , 1-2 p.m.  Monday, , 1-2 p.m.  Monday, , 1-2 p.m.  Monday, May 2nd, 1-2 p.m.  Monday, , 1-2 p.m.  Monday, , 1-2 p.m.  Monday, 1-2 p.m.  Monday, , 1-2 p.m.  Monday, , 1-2 p.m.  Monday, , 1-2 p.m.  Monday, , 1-2 p.m.      Flor Velasquez RN BSN CHFN  Cardiology Care Coordinator - Heart Failure/ [facility]  [facility]   Questions and [phone]   First press #1 for the [facility] and then press #4 for \"To send a message to your care team\"    "

## 2022-05-25 NOTE — NURSING NOTE
Diet: Patient instructed regarding a heart failure healthy diet, including discussion of reduced fat and 2000 mg daily sodium restriction, daily weights, medication purpose and compliance, fluid restrictions and resources for patient and family to access for assistance with heart failure management.       Labs: Patient was given results of the laboratory testing obtained today and patient was instructed about when to return for the next laboratory testing.     Med Reconcile: Reviewed and verified all current medications with the patient. The updated medication list was printed and given to the patient.     Med changes: no changes    Return Appointment: Patient given instructions regarding scheduling next clinic visit: Dr Montoya's team will be reaching out for scheduling, watchman group will also be reaching outMoe in 4-5 months    Patient stated she understood all health information given and agreed to call with further questions or concerns.     Flor Velasquez RN

## 2022-05-25 NOTE — LETTER
5/25/2022    RE: Betty Tee  [address]  [facility] [id number]     Dear Colleague,    Thank you for the opportunity to participate in the care of your patient, Betty Tee, at the [facility] [place] [facility]. Please see a copy of my visit note below.    May 25, 2022    Follow up HFpEF    Add SGL2i     HPI:   82 yo F with interstitial lung disease (moderate restriction), Sjogren's syndrome, HTN, atrial fibrillation, diverticulitis s/p colectomy 2011 who I am following for HFpEF. She is here for routine HFpEF follow up.     She has only occasional lightheadedness, no syncope, no chest pain, no PND, no orthopnea, or lower extremity edema.      She does wear her CPAP mask routinely.     Exercise tolerance is mostly limited by hip and back pain. She does have fatigue and KIMBLE with 1-2 blocks of walking.     PAST MEDICAL HISTORY:  Past Medical History:   Diagnosis Date     Alcohol abuse, in remission      Allergic rhinitis, cause unspecified     allegra helps when she takes it     Antiplatelet or antithrombotic long-term use      Atrial fibrillation (H)     in hosp in 11/11 after surgery w/ fluid overload     Cardiomegaly     LVH on stress echo- cardiac w/u at [facility] [facility]- neg CT scan for PE, neg stress echo in 8/06     Chest pain, unspecified      Congestive heart failure (H)      Depressive disorder      Diabetes (H)      Disorder of bone and cartilage, unspecified     osteopenia (had been on prempro), improved on 6/06 dexa, stable dexa 11/10     Diverticulosis of colon (without mention of hemorrhage)     last episode yrs ago     Essential hypertension, benign      Follicular bronchiolitis (H)     associated with Sjogrens, dx by chest CT showing mosaic attenuation and air trapping     Gastro-oesophageal reflux disease      ILD (interstitial lung disease) (H)     associated with Sjogrens, also has mildly elevated IgG4, first noted on  chest CT 2015 (mild changes) and also has small airways disease; ILD improved on follow up chest CT 2018.     Insomnia, unspecified     weaned off clonazepam     Irregular heart beat      Lumbago 7/09    MRI with NEAL, now seeing Dr. Cain for sciatic sx's     Major depressive disorder, recurrent episode, moderate (H)      Obstructive sleep apnea     uses cpap     Osteoarthrosis, unspecified whether generalized or localized, unspecified site      Sjogren's syndrome (H)     + RG and SSA and lip bx     Sleep apnea      Tobacco use disorder     chantix in 9/07, started again in 6/08, working     FAMILY HISTORY:  Mother had MI and CHF in her 60's. 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      CURRENT MEDICATIONS:  Current Outpatient Medications   Medication Sig Dispense Refill     ACCU-CHEK SHANNON PLUS test strip USE TO TEST BLOOD SUGARS 3 TIMES DAILY 300 strip 5     acetaminophen (TYLENOL) 500 MG tablet Take 1,000 mg by mouth every 8 hours as needed (max 6 tablets/24 hours, 2 tablets/dose)        acyclovir (ZOVIRAX) 400 MG tablet Take 1 tablet (400 mg) by mouth 3 times daily For a couple days 15 tablet 2     acyclovir (ZOVIRAX) 5 % external ointment Apply topically 6 times daily As needed for outbreaks 15 g 3     albuterol (PROAIR HFA/PROVENTIL HFA/VENTOLIN HFA) 108 (90 Base) MCG/ACT inhaler Inhale 2 puffs into the lungs every 6 hours 18 g 11     alendronate (FOSAMAX) 70 MG tablet Take 1 tablet (70 mg) by mouth every 7 days 12 tablet 0     anakinra (KINERET) 100 MG/0.67ML SOSY injection 100 mg every day x 3 days as needed for flare ups 6.7 mL 3     artificial tears OINT ophthalmic ointment Place 1 g into both eyes At Bedtime 7 g 11     atorvastatin (LIPITOR) 10 MG tablet TAKE 1/2 TABLET BY MOUTH EVERY DAY 45 tablet 2     azithromycin (ZITHROMAX) 250 MG tablet TAKE 1 TABLET BY MOUTH EVERY DAY 30 tablet 5     BD PEN NEEDLE JANE 2ND GEN  32G X 4 MM miscellaneous USE 4 DAILY AS DIRECTED. 400 each 1     bisacodyl (DULCOLAX) 5 MG EC tablet Take as directed. One day before exam take 2 tablets at 3 PM. Take 2 tablets at 11 PM. (Patient not taking: Reported on 5/24/2022) 4 tablet 0     blood glucose (NO BRAND SPECIFIED) lancets standard Use to test blood sugar 3 times daily or as directed 100 lancet 3     cevimeline (EVOXAC) 30 MG capsule Take 1 capsule (30 mg) by mouth 3 times daily (Patient taking differently: Take 30 mg by mouth every other day) 270 capsule 2     Cholecalciferol (VITAMIN D3) 50 MCG (2000 UT) CAPS Take 100 mcg by mouth daily (Take 2 tablet (50 mcg) by mouth daily - Oral) 90 capsule 1     COMPOUNDED NON-CONTROLLED SUBSTANCE (CMPD RX) - PHARMACY TO MIX COMPOUNDED MEDICATION Estriol 1 mg/g Apply small amount to finger and apply to inside vagina daily for 2 weeks then twice weekly Route: vaginally Dispense 30 grams 11 refills (Patient taking differently: Estriol 1 mg/g Apply small amount to finger and apply to inside vagina twice weekly Route: vaginally Dispense 30 grams 11 refills) 30 g 11     cyanocobalamin (VITAMIN B-12) 1000 MCG tablet Take 1 tablet (1,000 mcg) by mouth daily 30 tablet 1     escitalopram (LEXAPRO) 20 MG tablet TAKE 1 TABLET BY MOUTH EVERY DAY 90 tablet 0     ferrous sulfate (FEROSUL) 325 (65 Fe) MG tablet TAKE 1 TABLET BY MOUTH EVERY DAY WITH BREAKFAST 30 tablet 0     fluticasone (FLONASE) 50 MCG/ACT nasal spray SPRAY 1-2 SPRAYS INTO BOTH NOSTRILS DAILY AS NEEDED FOR ALLERGIES 16 mL 11     fluticasone-vilanterol (BREO ELLIPTA) 100-25 MCG/INH inhaler Inhale 1 puff into the lungs daily 1 each 11     HYDROcodone-acetaminophen (NORCO) 7.5-325 MG per tablet Take 1 tablet by mouth every 12 hours OK to fill and start 05/16/22 60 tablet 0     hydroxychloroquine (PLAQUENIL) 200 MG tablet Take 2 tablets (400 mg) by mouth daily Get annual eye exams for hydroxychloroquine (Plaquenil) monitoring and fax to [phone] 180 tablet 3      insulin glargine (BASAGLAR KWIKPEN) 100 UNIT/ML pen Sig: INJECT 28 UNITS SUBCUTANEOUS DAILY (down from 32 units in 3/22) (Patient taking differently: Sig: INJECT 28 UNITS SUBCUTANEOUS DAILY IN THE MORNING   (down from 32 units in 3/22)) 15 mL 0     ketoconazole (NIZORAL) 2 % external cream Apply topically 2 times daily as needed for itching 60 g 1     KLOR-CON 20 MEQ CR tablet TAKE 2 TABLETS (40 MEQ) BY MOUTH EVERY MORNING AND 1 TABLET (20 MEQ) EVERY EVENING. 270 tablet 3     lidocaine (LIDODERM) 5 % patch APPLY PATCH TO PAINFUL AREA FOR UP TO 12 H WITHIN A 24 H PERIOD. REMOVE AFTER 12 HOURS. (Patient taking differently: Apply patch to painful area as needed for up to 12 h within a 24 h period.  Remove after 12 hours.) 30 patch 2     loratadine (CLARITIN) 10 MG tablet TAKE 1 TABLET BY MOUTH EVERY DAY 90 tablet 3     methocarbamol (ROBAXIN) 750 MG tablet Take 1 tablet (750 mg) by mouth 3 times daily as needed for muscle spasms 90 tablet 3     metoprolol succinate ER (TOPROL-XL) 50 MG 24 hr tablet Take 1 tablet (50 mg) by mouth 2 times daily 90 tablet 3     montelukast (SINGULAIR) 10 MG tablet TAKE 1 TABLET BY MOUTH EVERYDAY AT BEDTIME 90 tablet 0     NOVOLOG FLEXPEN 100 UNIT/ML soln INJECT 12 UNITS SUBCUTANEOUS 3 TIMES DAILY (WITH MEALS) 15 mL 0     pantoprazole (PROTONIX) 40 MG EC tablet Take 1 tablet (40 mg) by mouth daily (replaces famotidine- should stop taking famotidine) 90 tablet 1     polyethylene glycol (GOLYTELY) 236 g suspension Take as directed. One day before exam fill the jug with water. Cover and shake until well mixed. At 6 PM start drinking an 8oz glass of mixture every 15 minutes until jug is 1/2 empty. Store remainder in the refrigerator.  At 11 PM Start drinking the other half of the Golytely jug. Drink one 8-ounce glass every 15 minutes until the jug is empty. (Patient not taking: Reported on 5/24/2022) 4000 mL 0     predniSONE (DELTASONE) 20 MG tablet Take 1 tablet (20 mg) daily for 7 days.  "(Patient not taking: Reported on 5/24/2022) 7 tablet 0     predniSONE (DELTASONE) 5 MG tablet Take 1 tablet (5 mg) by mouth daily (Patient not taking: Reported on 4/27/2022) 30 tablet 0     rivaroxaban ANTICOAGULANT (XARELTO ANTICOAGULANT) 20 MG TABS tablet TAKE 1 TABLET BY MOUTH DAILY WITH DINNER 90 tablet 3     Semaglutide, 1 MG/DOSE, (OZEMPIC, 1 MG/DOSE,) 4 MG/3ML SOPN Inject 1 mg Subcutaneous once a week (Patient taking differently: Inject 1 mg Subcutaneous once a week Mondays) 9 mL 1     spironolactone (ALDACTONE) 25 MG tablet Take 2 tablets (50 mg) by mouth daily 180 tablet 3     torsemide (DEMADEX) 10 MG tablet TAKE ONE TAB (10 MG) WITH ONE 20 MG TAB TO = 30 MG DAILY IN AFTERNOON. 90 tablet 3     torsemide (DEMADEX) 20 MG tablet TAKE 2 TABLETS (40 MG) BY MOUTH EVERY MORNING AND 1 TABLET (20 MG) DAILY AT 2 PM. TAKE THE AFTERNOON DOSE WITH AN EXTRA 10 MG TAB FOR A TOTAL OF 30 MG IN THE AFTERNOON 270 tablet 3     traZODone (DESYREL) 50 MG tablet Take 3 tablets (150 mg) by mouth nightly as needed for sleep 135 tablet 1     ROS:   Constitutional: No fever, chills. No weight gain/loss.   ENT: eye hematoma ear ache, epistaxis, sore throat.   Allergies/Immunologic: Negative.   Respiratory: + intermittent cough, no hemoptysis.   Cardiovascular: As per HPI.   GI: No nausea, vomiting, hematemesis, melena, or hematochezia.   : No urinary frequency, dysuria, or hematuria.   Integument: Negative.   Psychiatric: Negative.   Neuro: Negative.   Endocrinology: Negative.   Musculoskeletal: hip pain    EXAM:  /69 (BP Location: Right arm, Patient Position: Chair, Cuff Size: Adult Regular)   Pulse 62   Ht 1.676 m (5' 6\")   Wt 85.1 kg (187 lb 11.2 oz)   SpO2 99%   BMI 30.30 kg/m    General: appears comfortable, alert and articulate  Head: normocephalic, atraumatic  Neck: no adenopathy  Orophyarynx: moist mucosa, no lesions, dentition intact  Heart: regular, S1/S2, no murmur, gallop, rub, estimated JVP <10   Lungs: " diffuse expiratory wheezing, no crackles,no dullness   Abdomen: soft, non-tender, bowel sounds present, no hepatosplenomegaly  Extremities: trace LE edema, no clubbing, cyanosis.- right wrist wrapped   Neurological: normal speech and affect, no gross motor deficits    Labs:   HgA1c 7%     Last echo     ______________________________________________________________________________  Interpretation Summary  Global and regional left ventricular function is normal with an EF of 55-60%.  The right ventricle is normal size. Global right ventricular function is  normal.  No significant valvular abnormalities.  IVC diameter >2.1 cm collapsing <50% with sniff suggests a high RA pressure  estimated at 15 mmHg or greater.  This study was compared with the study from 08/27/2020. The LA filling  pressures are higher. There are no significant changes in the biventricular  function or aortic calibers.    Regadenosen MPI 2014: no fixed or inducible defects    Assessment and Plan:  In summary this is a very pleasant 81 year old female with suspected HFpEF who is here for HFpEF follow up.     In support of the diagnosis of HFpEF includes mild LA enlargement, echocardiographic signs of increase LV filling pressures, increased nt-bnp, as well as a diuretic requirement and symptomatic improvement on diuretics.    The risk factors for HFpEF in her include age, gender, HTN, diabetes, sleep apnea and obesity.  A prior stress test was negative for CAD. She is presently euvolemic on exam and is NYHA class II(mutifactorial).    The mainstays of therapy for HFpEF include volume management, blood pressure control, treating underlying sleep apnea if present, weight management, exercise training, rate control for atrial fibrillation as well as consideration for a rhythm control strategy, as well as consideration for clinical trials.  I do have her on spironolactone given the results of the TOPCAT trial. Patients have symptomatically felt very  improved on this medication. Her a fib rates have been under better control recently and her ischemia evaluation is negative.     Plan for HFpEF: continue current dose of diuretics, BP is controlled, volume status is euvolemic     Other:   Hypertension: controlled    Atrial fibrillation [id number] (age >75, HTN, CHF, gender) - rates controlled on metoprolol , on a xarelto.  She is actually sinus right now, by exam and on the echocardiogram.      Primary prevention: on statin, no ASA given she is on anti-coagulation     Follow up in 6 months with [facility], 1 year with me with an echo       Radha Rosa MD  Associate Professor of Medicine   [facility] Division of Cardiology

## 2022-05-25 NOTE — NURSING NOTE
Chief Complaint   Patient presents with     Follow Up     RHF, Labs prior     Vitals were taken and medications reconciled.    Gary Brower, EMT  3:20 PM

## 2022-05-25 NOTE — PROGRESS NOTES
May 25, 2022    Follow up HFpEF    Add SGL2i     HPI:   80 yo F with interstitial lung disease (moderate restriction), Sjogren's syndrome, HTN, atrial fibrillation, diverticulitis s/p colectomy 2011 who I am following for HFpEF. She is here for routine HFpEF follow up.     She has only occasional lightheadedness, no syncope, no chest pain, no PND, no orthopnea, or lower extremity edema.      She does wear her CPAP mask routinely.     Exercise tolerance is mostly limited by hip and back pain. She does have fatigue and KIMBLE with 1-2 blocks of walking.     PAST MEDICAL HISTORY:  Past Medical History:   Diagnosis Date     Alcohol abuse, in remission      Allergic rhinitis, cause unspecified     allegra helps when she takes it     Antiplatelet or antithrombotic long-term use      Atrial fibrillation (H)     in hosp in 11/11 after surgery w/ fluid overload     Cardiomegaly     LVH on stress echo- cardiac w/u at [facility] ER- neg CT scan for PE, neg stress echo in 8/06     Chest pain, unspecified      Congestive heart failure (H)      Depressive disorder      Diabetes (H)      Disorder of bone and cartilage, unspecified     osteopenia (had been on prempro), improved on 6/06 dexa, stable dexa 11/10     Diverticulosis of colon (without mention of hemorrhage)     last episode yrs ago     Essential hypertension, benign      Follicular bronchiolitis (H)     associated with Sjogrens, dx by chest CT showing mosaic attenuation and air trapping     Gastro-oesophageal reflux disease      ILD (interstitial lung disease) (H)     associated with Sjogrens, also has mildly elevated IgG4, first noted on chest CT 2015 (mild changes) and also has small airways disease; ILD improved on follow up chest CT 2018.     Insomnia, unspecified     weaned off clonazepam     Irregular heart beat      Lumbago 7/09    MRI with NEAL, now seeing Dr. Cain for sciatic sx's     Major depressive disorder, recurrent episode, moderate (H)      Obstructive sleep  apnea     uses cpap     Osteoarthrosis, unspecified whether generalized or localized, unspecified site      Sjogren's syndrome (H)     + RG and SSA and lip bx     Sleep apnea      Tobacco use disorder     chantix in 9/07, started again in 6/08, working     FAMILY HISTORY:  Mother had MI and CHF in her 60's. 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      CURRENT MEDICATIONS:  Current Outpatient Medications   Medication Sig Dispense Refill     ACCU-CHEK SHANNON PLUS test strip USE TO TEST BLOOD SUGARS 3 TIMES DAILY 300 strip 5     acetaminophen (TYLENOL) 500 MG tablet Take 1,000 mg by mouth every 8 hours as needed (max 6 tablets/24 hours, 2 tablets/dose)        acyclovir (ZOVIRAX) 400 MG tablet Take 1 tablet (400 mg) by mouth 3 times daily For a couple days 15 tablet 2     acyclovir (ZOVIRAX) 5 % external ointment Apply topically 6 times daily As needed for outbreaks 15 g 3     albuterol (PROAIR HFA/PROVENTIL HFA/VENTOLIN HFA) 108 (90 Base) MCG/ACT inhaler Inhale 2 puffs into the lungs every 6 hours 18 g 11     alendronate (FOSAMAX) 70 MG tablet Take 1 tablet (70 mg) by mouth every 7 days 12 tablet 0     anakinra (KINERET) 100 MG/0.67ML SOSY injection 100 mg every day x 3 days as needed for flare ups 6.7 mL 3     artificial tears OINT ophthalmic ointment Place 1 g into both eyes At Bedtime 7 g 11     atorvastatin (LIPITOR) 10 MG tablet TAKE 1/2 TABLET BY MOUTH EVERY DAY 45 tablet 2     azithromycin (ZITHROMAX) 250 MG tablet TAKE 1 TABLET BY MOUTH EVERY DAY 30 tablet 5     BD PEN NEEDLE JANE 2ND GEN 32G X 4 MM miscellaneous USE 4 DAILY AS DIRECTED. 400 each 1     bisacodyl (DULCOLAX) 5 MG EC tablet Take as directed. One day before exam take 2 tablets at 3 PM. Take 2 tablets at 11 PM. (Patient not taking: Reported on 5/24/2022) 4 tablet 0     blood glucose (NO BRAND SPECIFIED) lancets standard Use to test blood sugar 3 times daily or  as directed 100 lancet 3     cevimeline (EVOXAC) 30 MG capsule Take 1 capsule (30 mg) by mouth 3 times daily (Patient taking differently: Take 30 mg by mouth every other day) 270 capsule 2     Cholecalciferol (VITAMIN D3) 50 MCG (2000 UT) CAPS Take 100 mcg by mouth daily (Take 2 tablet (50 mcg) by mouth daily - Oral) 90 capsule 1     COMPOUNDED NON-CONTROLLED SUBSTANCE (CMPD RX) - PHARMACY TO MIX COMPOUNDED MEDICATION Estriol 1 mg/g Apply small amount to finger and apply to inside vagina daily for 2 weeks then twice weekly Route: vaginally Dispense 30 grams 11 refills (Patient taking differently: Estriol 1 mg/g Apply small amount to finger and apply to inside vagina twice weekly Route: vaginally Dispense 30 grams 11 refills) 30 g 11     cyanocobalamin (VITAMIN B-12) 1000 MCG tablet Take 1 tablet (1,000 mcg) by mouth daily 30 tablet 1     escitalopram (LEXAPRO) 20 MG tablet TAKE 1 TABLET BY MOUTH EVERY DAY 90 tablet 0     ferrous sulfate (FEROSUL) 325 (65 Fe) MG tablet TAKE 1 TABLET BY MOUTH EVERY DAY WITH BREAKFAST 30 tablet 0     fluticasone (FLONASE) 50 MCG/ACT nasal spray SPRAY 1-2 SPRAYS INTO BOTH NOSTRILS DAILY AS NEEDED FOR ALLERGIES 16 mL 11     fluticasone-vilanterol (BREO ELLIPTA) 100-25 MCG/INH inhaler Inhale 1 puff into the lungs daily 1 each 11     HYDROcodone-acetaminophen (NORCO) 7.5-325 MG per tablet Take 1 tablet by mouth every 12 hours OK to fill and start 05/16/22 60 tablet 0     hydroxychloroquine (PLAQUENIL) 200 MG tablet Take 2 tablets (400 mg) by mouth daily Get annual eye exams for hydroxychloroquine (Plaquenil) monitoring and fax to [phone] 180 tablet 3     insulin glargine (BASAGLAR KWIKPEN) 100 UNIT/ML pen Sig: INJECT 28 UNITS SUBCUTANEOUS DAILY (down from 32 units in 3/22) (Patient taking differently: Sig: INJECT 28 UNITS SUBCUTANEOUS DAILY IN THE MORNING   (down from 32 units in 3/22)) 15 mL 0     ketoconazole (NIZORAL) 2 % external cream Apply topically 2 times daily as needed for  itching 60 g 1     KLOR-CON 20 MEQ CR tablet TAKE 2 TABLETS (40 MEQ) BY MOUTH EVERY MORNING AND 1 TABLET (20 MEQ) EVERY EVENING. 270 tablet 3     lidocaine (LIDODERM) 5 % patch APPLY PATCH TO PAINFUL AREA FOR UP TO 12 H WITHIN A 24 H PERIOD. REMOVE AFTER 12 HOURS. (Patient taking differently: Apply patch to painful area as needed for up to 12 h within a 24 h period.  Remove after 12 hours.) 30 patch 2     loratadine (CLARITIN) 10 MG tablet TAKE 1 TABLET BY MOUTH EVERY DAY 90 tablet 3     methocarbamol (ROBAXIN) 750 MG tablet Take 1 tablet (750 mg) by mouth 3 times daily as needed for muscle spasms 90 tablet 3     metoprolol succinate ER (TOPROL-XL) 50 MG 24 hr tablet Take 1 tablet (50 mg) by mouth 2 times daily 90 tablet 3     montelukast (SINGULAIR) 10 MG tablet TAKE 1 TABLET BY MOUTH EVERYDAY AT BEDTIME 90 tablet 0     NOVOLOG FLEXPEN 100 UNIT/ML soln INJECT 12 UNITS SUBCUTANEOUS 3 TIMES DAILY (WITH MEALS) 15 mL 0     pantoprazole (PROTONIX) 40 MG EC tablet Take 1 tablet (40 mg) by mouth daily (replaces famotidine- should stop taking famotidine) 90 tablet 1     polyethylene glycol (GOLYTELY) 236 g suspension Take as directed. One day before exam fill the jug with water. Cover and shake until well mixed. At 6 PM start drinking an 8oz glass of mixture every 15 minutes until jug is 1/2 empty. Store remainder in the refrigerator.  At 11 PM Start drinking the other half of the Golytely jug. Drink one 8-ounce glass every 15 minutes until the jug is empty. (Patient not taking: Reported on 5/24/2022) 4000 mL 0     predniSONE (DELTASONE) 20 MG tablet Take 1 tablet (20 mg) daily for 7 days. (Patient not taking: Reported on 5/24/2022) 7 tablet 0     predniSONE (DELTASONE) 5 MG tablet Take 1 tablet (5 mg) by mouth daily (Patient not taking: Reported on 4/27/2022) 30 tablet 0     rivaroxaban ANTICOAGULANT (XARELTO ANTICOAGULANT) 20 MG TABS tablet TAKE 1 TABLET BY MOUTH DAILY WITH DINNER 90 tablet 3     Semaglutide, 1 MG/DOSE,  "(OZEMPIC, 1 MG/DOSE,) 4 MG/3ML SOPN Inject 1 mg Subcutaneous once a week (Patient taking differently: Inject 1 mg Subcutaneous once a week Mondays) 9 mL 1     spironolactone (ALDACTONE) 25 MG tablet Take 2 tablets (50 mg) by mouth daily 180 tablet 3     torsemide (DEMADEX) 10 MG tablet TAKE ONE TAB (10 MG) WITH ONE 20 MG TAB TO = 30 MG DAILY IN AFTERNOON. 90 tablet 3     torsemide (DEMADEX) 20 MG tablet TAKE 2 TABLETS (40 MG) BY MOUTH EVERY MORNING AND 1 TABLET (20 MG) DAILY AT 2 PM. TAKE THE AFTERNOON DOSE WITH AN EXTRA 10 MG TAB FOR A TOTAL OF 30 MG IN THE AFTERNOON 270 tablet 3     traZODone (DESYREL) 50 MG tablet Take 3 tablets (150 mg) by mouth nightly as needed for sleep 135 tablet 1     ROS:   Constitutional: No fever, chills. No weight gain/loss.   ENT: eye hematoma ear ache, epistaxis, sore throat.   Allergies/Immunologic: Negative.   Respiratory: + intermittent cough, no hemoptysis.   Cardiovascular: As per HPI.   GI: No nausea, vomiting, hematemesis, melena, or hematochezia.   : No urinary frequency, dysuria, or hematuria.   Integument: Negative.   Psychiatric: Negative.   Neuro: Negative.   Endocrinology: Negative.   Musculoskeletal: hip pain    EXAM:  /69 (BP Location: Right arm, Patient Position: Chair, Cuff Size: Adult Regular)   Pulse 62   Ht 1.676 m (5' 6\")   Wt 85.1 kg (187 lb 11.2 oz)   SpO2 99%   BMI 30.30 kg/m    General: appears comfortable, alert and articulate  Head: normocephalic, atraumatic  Neck: no adenopathy  Orophyarynx: moist mucosa, no lesions, dentition intact  Heart: regular, S1/S2, no murmur, gallop, rub, estimated JVP <10   Lungs: diffuse expiratory wheezing, no crackles,no dullness   Abdomen: soft, non-tender, bowel sounds present, no hepatosplenomegaly  Extremities: trace LE edema, no clubbing, cyanosis.- right wrist wrapped   Neurological: normal speech and affect, no gross motor deficits    Labs:   HgA1c 7%     Last echo "     ______________________________________________________________________________  Interpretation Summary  Global and regional left ventricular function is normal with an EF of 55-60%.  The right ventricle is normal size. Global right ventricular function is  normal.  No significant valvular abnormalities.  IVC diameter >2.1 cm collapsing <50% with sniff suggests a high RA pressure  estimated at 15 mmHg or greater.  This study was compared with the study from 08/27/2020. The LA filling  pressures are higher. There are no significant changes in the biventricular  function or aortic calibers.    Regadenosen MPI 2014: no fixed or inducible defects    Assessment and Plan:  In summary this is a very pleasant 81 year old female with suspected HFpEF who is here for HFpEF follow up.     In support of the diagnosis of HFpEF includes mild LA enlargement, echocardiographic signs of increase LV filling pressures, increased nt-bnp, as well as a diuretic requirement and symptomatic improvement on diuretics.    The risk factors for HFpEF in her include age, gender, HTN, diabetes, sleep apnea and obesity.  A prior stress test was negative for CAD. She is presently euvolemic on exam and is NYHA class II(mutifactorial).    The mainstays of therapy for HFpEF include volume management, blood pressure control, treating underlying sleep apnea if present, weight management, exercise training, rate control for atrial fibrillation as well as consideration for a rhythm control strategy, as well as consideration for clinical trials.  I do have her on spironolactone given the results of the TOPCAT trial. Patients have symptomatically felt very improved on this medication. Her a fib rates have been under better control recently and her ischemia evaluation is negative.     Plan for HFpEF: continue current dose of diuretics, BP is controlled, volume status is euvolemic     Other:   Hypertension: controlled    Atrial fibrillation [id number]  (age >75, HTN, CHF, gender) - rates controlled on metoprolol , on a xarelto.  She is actually sinus right now, by exam and on the echocardiogram.      Primary prevention: on statin, no ASA given she is on anti-coagulation     Follow up in 6 months with [facility], 1 year with me with an echo         Radha Rosa MD  Associate Professor of Medicine   [facility] Division of Cardiology

## 2022-05-26 ENCOUNTER — TELEPHONE (OUTPATIENT)
Dept: GASTROENTEROLOGY | Facility: CLINIC | Age: 82
End: 2022-05-26
Payer: MEDICARE

## 2022-05-26 ENCOUNTER — TELEPHONE (OUTPATIENT)
Dept: GASTROENTEROLOGY | Facility: CLINIC | Age: 82
End: 2022-05-26

## 2022-05-26 DIAGNOSIS — Z11.59 ENCOUNTER FOR SCREENING FOR OTHER VIRAL DISEASES: Primary | ICD-10-CM

## 2022-05-26 NOTE — TELEPHONE ENCOUNTER
Caller: Nghia Carlsno    Procedure: EGD    Date, Location, and Surgeon of Procedure Cancelled: 6/8/2022, [facility],     Ordering Provider:    Reason for cancel (please be detailed, any staff messages or encounters to note?): per notes from Yvonne. Pt needed a sooner apt time.         Rescheduled: YES     If rescheduled:    Date: 5/31/2022   Location: [facility]   Prep Resent: yes (changes to prep?)   [place] [facility] [place]   Note any change or update to original order/sedation: pre op done by cardiologist-- note sent to  and team

## 2022-05-26 NOTE — TELEPHONE ENCOUNTER
Pt rescheduled for 5/31/22.     Pre assessment questions completed for upcoming EGD procedure scheduled on 5/31/22    COVID test scheduled 5/27/22    Pre-op scheduled: Per Dr. Jan Agrawal is ok with not having an official pre-op, and will use recent cardiology visit and visit with PCP as surgical clearance. Will send staff message to Dr. Jan Russell's RN to verify that no official pre-op is needed.     Reviewed procedural arrival time 8:30am and facility location [facility].    Designated  policy reviewed. Instructed to have someone stay 24 hours post procedure.     Procedure indication: See below.     Prep instructions sent via [employer].    Reviewed EGD prep instructions with patient.     Anticoagulation/blood thinners? Xarelto -- Is aware of 2 day hold, Per Nghia, Cardiology advised the begin hold on Friday, and I see that noted in OV note.     Per Nghia, she is requesting we send a staff message to the Pt's PCP Dr. Tristan, Ilene Mahan MD regarding holding any of her insulin or daily medications prior to the procedure. I have done this. I requested that Dr. Tristan send a [employer] message to Betty's account.     Electronic implanted devices? None    Patient verbalized understanding and had no questions or concerns at this time.    Suzanne Morocho, JASON

## 2022-05-26 NOTE — TELEPHONE ENCOUNTER
Called and spoke with Sister Nghia, (consent to communicate is on file). Per Nghia the Pt had a visit with her Cardiologist yesterday and was advised that her EGD should be moved to a sooner date due to her having black stools. Per Cardiology note on 5/25/22:    Follow up:  1.  Dr Montoya is going to move up your scope- his team will call you for the new date.    2. Stop your blood thinner Friday    Will send staff message to Dr. Montoya and Nurse for update on procedure.       Patient scheduled for EGD on 6/7/22.     Covid test scheduled: 6/4/22    Pre op exam scheduled: PAC eval scheduled for 6/1/22    Arrival time: 8:15am    Facility location: [facility]    Sedation type: MAC    Indication for procedure: iron defeciency anemia, follow up from radiofrequency ablation    Anticoagulations? Xarelto Holding interval of 2 days.    Prep instructions sent via [employer]    Pre visit planning completed.    Suzanne Morocho RN

## 2022-05-26 NOTE — TELEPHONE ENCOUNTER
Yvonne Figueroa RN Zilbauer, Rachel M, RN Yup, that's fine- thanks!             Previous Messages       ----- Message -----   From: Suzanne Morocho RN   Sent: 5/26/2022   3:45 PM CDT   To: Yvonne Figueroa RN, Suzanne Morocho RN   Subject: FW: MAC spot on 5/31                             Steve Russell,     Just wanted to double check on the Pre-Op. Is Dr. Montoya ok going off of the recent cardiology and primary care visit? No official pre-op has been done yet. Thank you, just double checking.     Suzanne Morocho RN   -[facility]

## 2022-05-27 ENCOUNTER — LAB (OUTPATIENT)
Dept: LAB | Facility: CLINIC | Age: 82
End: 2022-05-27
Payer: MEDICARE

## 2022-05-27 ENCOUNTER — HOSPITAL ENCOUNTER (OUTPATIENT)
Facility: CLINIC | Age: 82
Discharge: HOME | End: 2022-05-27
Attending: INTERNAL MEDICINE | Admitting: FAMILY MEDICINE
Payer: MEDICARE

## 2022-05-27 DIAGNOSIS — E55.9 VITAMIN D DEFICIENCY: ICD-10-CM

## 2022-05-27 DIAGNOSIS — Z11.59 ENCOUNTER FOR SCREENING FOR OTHER VIRAL DISEASES: ICD-10-CM

## 2022-05-27 LAB — SARS-COV-2 RNA RESP QL NAA+PROBE: NEGATIVE

## 2022-05-27 PROCEDURE — U0005 INFEC AGEN DETEC AMPLI PROBE: HCPCS

## 2022-05-27 PROCEDURE — 99000 SPECIMEN HANDLING OFFICE-LAB: CPT | Performed by: PATHOLOGY

## 2022-05-27 PROCEDURE — U0003 INFECTIOUS AGENT DETECTION BY NUCLEIC ACID (DNA OR RNA); SEVERE ACUTE RESPIRATORY SYNDROME CORONAVIRUS 2 (SARS-COV-2) (CORONAVIRUS DISEASE [COVID-19]), AMPLIFIED PROBE TECHNIQUE, MAKING USE OF HIGH THROUGHPUT TECHNOLOGIES AS DESCRIBED BY CMS-2020-01-R: HCPCS

## 2022-05-28 ENCOUNTER — MYC MEDICAL ADVICE (OUTPATIENT)
Dept: FAMILY MEDICINE | Facility: CLINIC | Age: 82
End: 2022-05-28
Payer: MEDICARE

## 2022-05-28 ENCOUNTER — TELEPHONE (OUTPATIENT)
Dept: FAMILY MEDICINE | Facility: CLINIC | Age: 82
End: 2022-05-28
Payer: MEDICARE

## 2022-05-28 NOTE — TELEPHONE ENCOUNTER
Called and left msg with Nghia that Betty overall can follow the same instructions as she did prior to her last EGD (see details at end of this msg), and also sent [facility] msg as below.    'Hello Sister Betty and Nghia,    I received a message from the GI team that you wouldn't be having a per-op assessment before your upcoming endoscopy, and that they wanted me to reach out to you about insulin dosing.  I tried to reach both of you via phone today, but was unable to reach you.  I left a message on Nghia's phone, but the more clear plan is as below...       - Don't take Novolog insulin on the day of procedure (Tuesday).     - Take Glargine (Basaglar) 20 units (which is a bit less than 80% of your usual dose, lower due to your lower recent A1C) on the day of procedure (Tuesday).     -For the rest of the medications, follow the recommendations from your previous pre-op assessment in 4/22...  --Hold the aspirin, ibuprofen and naproxen for two days prior to the procedure.  --Hold any multivitamin, herbal supplements/medications for seven days prior to the procedure.  --Hold the rivaroxaban (xarelto) two days prior to the procedure (or per Dr. Rosa's recs).      It looks like your hemoglobin A1C has really improved in general, though, going down from the mid 6's to 5.9 at your recent lab check.  I think we can overall continue lower your basaglar dosing.  I would recommend you make an appointment to see Sabrina Meek [facility] to discuss options, which can likely be a virtual appointment.    I am on call this weekend, so you can call the clinic number below and the nurses can connect you to me if you have any questions at all.    Best,  Lev Tristan MD  [facility]  [phone]'        Recs from 4/27/22 pre-op prior to her recent EGD...  'You were seen today in the PAC Clinic   (Pre operative Anest[facility] Assessment [facility])  [address]  [address]   phone [phone]     You had a  pre operative assessment done:     Anesthesia recommendations for medications:     Hold aspirin for 2 days before procedure  Hold multivitamins for 7 days before procedure   Hold herbal medications and supplements for 7 days before procedure  Hold ibuprofen for 2 days before procedure   Hold Naproxen for 2 days before procedure         Special instructions for anticoagulation medications:     Rivaroxaban (Xarelto) hold for **2 days** before your procedure - last dose to be 4/30/22        Please hold the following medications the day of procedure:     Cevimeline (Evoxac)  Cholecalciferol (Vitamin D3)  Cyanocobalamin (Vitamin B12)  Ferrous sulfate (Ferosul)  **Novolog insulin**  KLOR-CON        Please take these medications the day of procedure:     Tylenol if needed  Albuterol inhaler if needed and bring this to the hospital  Atorvastatin (Lipitor)  Azithromycin (Zithromax) if you are currently taking this  Escitalopram (Lexapro)  Fluticasone (Flonase) nasal spray  Fluticasone-vilanterol (Breo Ellipta) inhaler  Hydrocodone-acetaminophen (Norco) if needed  Hydroxychloroquine (Plaquenil)  Insulin Glargine (Basaglar) take 22 units which is 80% of your usual dose   Lidocaine patch if needed  Loratadine (Claritin) Methocarbamol (Robaxin) if needed  Metroprolol (Toprolol)  Pantoprazole (Protonix)  Spironolactone (Aldactone)  Torsemide (Demadex)                 For questions or appointments, call:  [facility]: [phone], option 2  Monday through Friday, 8 a.m. to 4:30 p.m.  (If it is after hours please reach out to the clinic or provider you were scheduled with.)'

## 2022-05-28 NOTE — TELEPHONE ENCOUNTER
----- Message from Suzanne Morocho RN sent at 5/26/2022  3:48 PM CDT -----  Regarding: Holding Medication  Hello Dr. Tristan,     This Pt has an EGD scheduled for 5/31/22.     GI has a policy only holding PO diabetic medications the morning of the procedure, however we do not have a policy on holding injectable insulin for procedures. We defer this to the managing provider. I spoke with Sister Nghia, who helps Betty with her health care. (There is consent on file).    Can you please advise them on holding/taking directions for her medications? They have already been instructed on holding directions for Xarelto by cardiology.     Nghia is requesting the holding information be sent to [facility]. Thank you!    Suzanne Morocho RN   -[facility]

## 2022-05-30 ENCOUNTER — ANESTHESIA EVENT (OUTPATIENT)
Dept: GASTROENTEROLOGY | Facility: CLINIC | Age: 82
End: 2022-05-30
Payer: MEDICARE

## 2022-05-31 ENCOUNTER — HOSPITAL ENCOUNTER (OUTPATIENT)
Facility: CLINIC | Age: 82
Discharge: HOME | End: 2022-05-31
Attending: INTERNAL MEDICINE | Admitting: INTERNAL MEDICINE
Payer: MEDICARE

## 2022-05-31 ENCOUNTER — ANESTHESIA (OUTPATIENT)
Dept: GASTROENTEROLOGY | Facility: CLINIC | Age: 82
End: 2022-05-31
Payer: MEDICARE

## 2022-05-31 ENCOUNTER — DOCUMENTATION ONLY (OUTPATIENT)
Dept: MULTI SPECIALTY CLINIC | Facility: CLINIC | Age: 82
End: 2022-05-31

## 2022-05-31 VITALS
TEMPERATURE: 98.8 F | RESPIRATION RATE: 16 BRPM | OXYGEN SATURATION: 95 % | HEART RATE: 60 BPM | HEIGHT: 66 IN | BODY MASS INDEX: 30.05 KG/M2 | SYSTOLIC BLOOD PRESSURE: 91 MMHG | WEIGHT: 186.95 LBS | DIASTOLIC BLOOD PRESSURE: 61 MMHG

## 2022-05-31 LAB
BASOPHILS # BLD AUTO: 0.1 10E3/UL (ref 0–0.2)
BASOPHILS NFR BLD AUTO: 1 %
EOSINOPHIL # BLD AUTO: 0.1 10E3/UL (ref 0–0.7)
EOSINOPHIL NFR BLD AUTO: 2 %
ERYTHROCYTE [DISTWIDTH] IN BLOOD BY AUTOMATED COUNT: 15.4 % (ref 10–15)
GLUCOSE BLDC GLUCOMTR-MCNC: 163 MG/DL (ref 70–99)
HCT VFR BLD AUTO: 28.7 % (ref 35–47)
HGB BLD-MCNC: 8.6 G/DL (ref 11.7–15.7)
IMM GRANULOCYTES # BLD: 0.1 10E3/UL
IMM GRANULOCYTES NFR BLD: 1 %
LYMPHOCYTES # BLD AUTO: 1.3 10E3/UL (ref 0.8–5.3)
LYMPHOCYTES NFR BLD AUTO: 17 %
MCH RBC QN AUTO: 29.5 PG (ref 26.5–33)
MCHC RBC AUTO-ENTMCNC: 30 G/DL (ref 31.5–36.5)
MCV RBC AUTO: 98 FL (ref 78–100)
MONOCYTES # BLD AUTO: 1 10E3/UL (ref 0–1.3)
MONOCYTES NFR BLD AUTO: 13 %
NEUTROPHILS # BLD AUTO: 5.1 10E3/UL (ref 1.6–8.3)
NEUTROPHILS NFR BLD AUTO: 66 %
NRBC # BLD AUTO: 0 10E3/UL
NRBC BLD AUTO-RTO: 0 /100
PLATELET # BLD AUTO: 272 10E3/UL (ref 150–450)
RBC # BLD AUTO: 2.92 10E6/UL (ref 3.8–5.2)
UPPER GI ENDOSCOPY: NORMAL
WBC # BLD AUTO: 7.7 10E3/UL (ref 4–11)

## 2022-05-31 PROCEDURE — 43255 EGD CONTROL BLEEDING ANY: CPT | Performed by: INTERNAL MEDICINE

## 2022-05-31 PROCEDURE — 258N000003 HC RX IP 258 OP 636: Performed by: NURSE ANESTHETIST, CERTIFIED REGISTERED

## 2022-05-31 PROCEDURE — 250N000009 HC RX 250: Performed by: NURSE ANESTHETIST, CERTIFIED REGISTERED

## 2022-05-31 PROCEDURE — 250N000011 HC RX IP 250 OP 636: Performed by: NURSE ANESTHETIST, CERTIFIED REGISTERED

## 2022-05-31 PROCEDURE — 85004 AUTOMATED DIFF WBC COUNT: CPT | Performed by: INTERNAL MEDICINE

## 2022-05-31 PROCEDURE — 370N000017 HC ANESTHESIA TECHNICAL FEE, PER MIN: Performed by: INTERNAL MEDICINE

## 2022-05-31 PROCEDURE — 250N000011 HC RX IP 250 OP 636: Performed by: INTERNAL MEDICINE

## 2022-05-31 PROCEDURE — 36415 COLL VENOUS BLD VENIPUNCTURE: CPT | Performed by: INTERNAL MEDICINE

## 2022-05-31 PROCEDURE — 43270 EGD LESION ABLATION: CPT | Performed by: INTERNAL MEDICINE

## 2022-05-31 PROCEDURE — 82962 GLUCOSE BLOOD TEST: CPT

## 2022-05-31 RX ORDER — PROPOFOL 10 MG/ML
INJECTION, EMULSION INTRAVENOUS CONTINUOUS PRN
Status: DISCONTINUED | OUTPATIENT
Start: 2022-05-31 | End: 2022-05-31

## 2022-05-31 RX ORDER — ONDANSETRON 2 MG/ML
4 INJECTION INTRAMUSCULAR; INTRAVENOUS EVERY 6 HOURS PRN
Status: CANCELLED | OUTPATIENT
Start: 2022-05-31

## 2022-05-31 RX ORDER — ONDANSETRON 2 MG/ML
4 INJECTION INTRAMUSCULAR; INTRAVENOUS
Status: COMPLETED | OUTPATIENT
Start: 2022-05-31 | End: 2022-05-31

## 2022-05-31 RX ORDER — CHOLECALCIFEROL (VITAMIN D3) 50 MCG
TABLET ORAL
Qty: 90 TABLET | Refills: 2 | OUTPATIENT
Start: 2022-05-31

## 2022-05-31 RX ORDER — FENTANYL CITRATE 50 UG/ML
INJECTION, SOLUTION INTRAMUSCULAR; INTRAVENOUS PRN
Status: DISCONTINUED | OUTPATIENT
Start: 2022-05-31 | End: 2022-05-31

## 2022-05-31 RX ORDER — NALOXONE HYDROCHLORIDE 0.4 MG/ML
0.2 INJECTION, SOLUTION INTRAMUSCULAR; INTRAVENOUS; SUBCUTANEOUS
Status: CANCELLED | OUTPATIENT
Start: 2022-05-31

## 2022-05-31 RX ORDER — FLUMAZENIL 0.1 MG/ML
0.2 INJECTION, SOLUTION INTRAVENOUS
Status: CANCELLED | OUTPATIENT
Start: 2022-05-31 | End: 2022-05-31

## 2022-05-31 RX ORDER — ONDANSETRON 4 MG/1
4 TABLET, ORALLY DISINTEGRATING ORAL EVERY 6 HOURS PRN
Status: CANCELLED | OUTPATIENT
Start: 2022-05-31

## 2022-05-31 RX ORDER — SODIUM CHLORIDE, SODIUM LACTATE, POTASSIUM CHLORIDE, CALCIUM CHLORIDE 600; 310; 30; 20 MG/100ML; MG/100ML; MG/100ML; MG/100ML
INJECTION, SOLUTION INTRAVENOUS CONTINUOUS PRN
Status: DISCONTINUED | OUTPATIENT
Start: 2022-05-31 | End: 2022-05-31

## 2022-05-31 RX ORDER — LIDOCAINE 40 MG/G
CREAM TOPICAL
Status: DISCONTINUED | OUTPATIENT
Start: 2022-05-31 | End: 2022-05-31 | Stop reason: HOSPADM

## 2022-05-31 RX ORDER — EPHEDRINE SULFATE 50 MG/ML
INJECTION, SOLUTION INTRAMUSCULAR; INTRAVENOUS; SUBCUTANEOUS PRN
Status: DISCONTINUED | OUTPATIENT
Start: 2022-05-31 | End: 2022-05-31

## 2022-05-31 RX ORDER — NALOXONE HYDROCHLORIDE 0.4 MG/ML
0.4 INJECTION, SOLUTION INTRAMUSCULAR; INTRAVENOUS; SUBCUTANEOUS
Status: CANCELLED | OUTPATIENT
Start: 2022-05-31

## 2022-05-31 RX ADMIN — PROPOFOL 150 MCG/KG/MIN: 10 INJECTION, EMULSION INTRAVENOUS at 10:26

## 2022-05-31 RX ADMIN — PROPOFOL 20 MG: 10 INJECTION, EMULSION INTRAVENOUS at 10:47

## 2022-05-31 RX ADMIN — SODIUM CHLORIDE, POTASSIUM CHLORIDE, SODIUM LACTATE AND CALCIUM CHLORIDE: 600; 310; 30; 20 INJECTION, SOLUTION INTRAVENOUS at 10:22

## 2022-05-31 RX ADMIN — TOPICAL ANESTHETIC 1 SPRAY: 200 SPRAY DENTAL; PERIODONTAL at 10:19

## 2022-05-31 RX ADMIN — FENTANYL CITRATE 50 MCG: 50 INJECTION, SOLUTION INTRAMUSCULAR; INTRAVENOUS at 10:28

## 2022-05-31 RX ADMIN — Medication 5 MG: at 10:35

## 2022-05-31 RX ADMIN — ONDANSETRON 4 MG: 2 INJECTION INTRAMUSCULAR; INTRAVENOUS at 12:11

## 2022-05-31 ASSESSMENT — ENCOUNTER SYMPTOMS: DYSRHYTHMIAS: 1

## 2022-05-31 ASSESSMENT — NEW YORK HEART ASSOCIATION (NYHA) CLASSIFICATION: NYHA FUNCTIONAL CLASS: III

## 2022-05-31 NOTE — ANESTHESIA PREPROCEDURE EVALUATION
Anesthesia Pre-Procedure Evaluation    Patient: Betty Tee   MRN: [id number] : 1940        Procedure : Procedure(s):  ESOPHAGOGASTRODUODENOSCOPY (EGD)          Past Medical History:   Diagnosis Date     Alcohol abuse, in remission      Allergic rhinitis, cause unspecified     allegra helps when she takes it     Antiplatelet or antithrombotic long-term use      Atrial fibrillation (H)     in hosp in  after surgery w/ fluid overload     Cardiomegaly     LVH on stress echo- cardiac w/u at [facility] ER- neg CT scan for PE, neg stress echo in      Chest pain, unspecified      Congestive heart failure (H)      Depressive disorder      Diabetes (H)      Disorder of bone and cartilage, unspecified     osteopenia (had been on prempro), improved on  dexa, stable dexa 11/10     Diverticulosis of colon (without mention of hemorrhage)     last episode yrs ago     Essential hypertension, benign      Follicular bronchiolitis (H)     associated with Sjogrens, dx by chest CT showing mosaic attenuation and air trapping     Gastro-oesophageal reflux disease      ILD (interstitial lung disease) (H)     associated with Sjogrens, also has mildly elevated IgG4, first noted on chest CT  (mild changes) and also has small airways disease; ILD improved on follow up chest CT 2018.     Insomnia, unspecified     weaned off clonazepam     Irregular heart beat      Lumbago     MRI with NEAL, now seeing Dr. Cain for sciatic sx's     Major depressive disorder, recurrent episode, moderate (H)      Obstructive sleep apnea     uses cpap     Osteoarthrosis, unspecified whether generalized or localized, unspecified site      Sjogren's syndrome (H)     + RG and SSA and lip bx     Sleep apnea      Tobacco use disorder     chantix in , started again in , working      Past Surgical History:   Procedure Laterality Date     APPENDECTOMY       BACK SURGERY       BACK SURGERY       BIOPSY BREAST  2002     Biopsy Left Breast     BIOPSY BREAST Left 09/27/2002     BREAST SURGERY       CARDIAC SURGERY       CARDIAC SURGERY       CHOLECYSTECTOMY  1990's?     CHOLECYSTECTOMY       COLECTOMY LEFT  11/07/2011    Procedure:COLECTOMY LEFT; Laparoscopic mobilization of splenic flexture, sigmoid colectomy, coloprotoscopy, loop illeostomy; Surgeon:CK CASTANEDA; Location:[facility] OR     COLECTOMY LEFT  11/07/2011     COLONOSCOPY  1990,s     COLONOSCOPY N/A 5/3/2022    Procedure: COLONOSCOPY;  Surgeon: Guru Winsome Dias MD;  Location: [facility] GI     ENT SURGERY       EYE SURGERY  2012     FLEXIBLE SIGMOIDOSCOPY  11/03/2011     HYSTERECTOMY TOTAL ABDOMINAL, BILATERAL SALPINGO-OOPHORECTOMY, COMBINED  11/07/2011    Procedure:COMBINED HYSTERECTOMY TOTAL ABDOMINAL, BILATERAL SALPINGO-OOPHORECTOMY; total abdominal hysterectomy, bilateral salpingo-oophorectomy; Surgeon:ALETA MANUEL; Location[facility] OR     HYSTERECTOMY TOTAL ABDOMINAL, BILATERAL SALPINGO-OOPHORECTOMY, COMBINED  11/07/2011     ILEOSTOMY  02/01/2012    takedown loop ileostomy      INSERT STENT URETER  11/07/2011    Procedure:INSERT STENT URETER; Placement of Bilateral Ureteral Stents ; Surgeon:PRANEETH BRYANT; Location[facility] OR     SIGMOIDECTOMY  02/01/2012    Dr. Castaneda, Sigmoidectomy for diverticular abscess, iliostomy afterwards until repair     SIGMOIDOSCOPY FLEXIBLE  11/03/2011    Procedure:SIGMOIDOSCOPY FLEXIBLE; Flexible Sigmoidoscopy; Surgeon:CK CASTANEDA; Location[facility]     TAKEDOWN ILEOSTOMY  02/01/2012    Procedure:TAKEDOWN ILEOSTOMY; Takedown Loop Ileostomy ; Surgeon:CK CASTANEDA; Location[facility] OR     URETERAL STENT PLACEMENT  11/07/2011     ZZC APPENDECTOMY  1970's?     ZZC NONSPECIFIC PROCEDURE  11/2005    exploratory abd lap, adhesions, resolved RLQ pain, diverticulitis episodes      Allergies   Allergen Reactions     Amoxicillin-Pot Clavulanate      Augmentin Nausea and Vomiting     Codeine Nausea and Vomiting     Codeine      PN: LW Reaction:  HIVES     Penicillins Nausea and Vomiting     PN: LW Reaction: GI Upset     Phenobarbital Itching     Phenobarbital      Seasonal Allergies       Social History     Tobacco Use     Smoking status: Former Smoker     Packs/day: 0.50     Years: 10.00     Pack years: 5.00     Types: Cigarettes     Quit date: 8/1/2011     Years since quitting: 10.8     Smokeless tobacco: Never Used     Tobacco comment: 1/2 ppd   Substance Use Topics     Alcohol use: No     Alcohol/week: 0.0 standard drinks     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      Wt Readings from Last 1 Encounters:   05/31/22 84.8 kg (186 lb 15.2 oz)        Anesthesia Evaluation   Pt has had prior anesthetic. Type: General.        ROS/MED HX  ENT/Pulmonary: Comment:  Sjogren's follicular bronchiolitis, chronic systemic steroid therapy  She was last seen in pulmonology on 4/22/22, at which time she was evaluated for a productive cough. Her PFT was unremarkable. She was treated with a 7 day course of prednisone 20 mg daily (ends 4/30/22), which is increased from her daily maintenance dose of 5 mg.    (+) sleep apnea, uses CPAP, allergic rhinitis,     Neurologic: Comment: RLS      Cardiovascular: Comment: A-fib ([id number]-RURg 5), chronic KIMBLE             (+) Dyslipidemia hypertension-----CHF Last EF:  HFpEF NYHA classification: III. KIMBLE. dysrhythmias, a-fib, Irregular Heartbeat/Palpitations, Previous cardiac testing   Echo: Date: 9/2021 Results:  Global and regional left ventricular function is normal with an EF of 55-60%.  The right ventricle is normal size. Global right ventricular function is  normal.  No significant valvular abnormalities.  IVC diameter >2.1 cm collapsing <50% with sniff suggests a high RA pressure  estimated at 15 mmHg or greater.  This study was compared with the study from 08/27/2020. The LA filling  pressures are higher. There are no significant changes in the biventricular  function or aortic calibers.  Stress Test: Date: Results:    ECG  Reviewed: Date: 2/2022 Results:  Sinus bradycardia with 1st degree A-V block   Low voltage QRS   Borderline ECG   When compared with ECG of 03-MAY-2021 10:44,   No significant change was found   Confirmed by Wade Ruiz ([id number]) on 2/15/2022 1:16:55 PM   Cath: Date: Results:      METS/Exercise Tolerance: 1 - Eating, dressing    Hematologic:     (+) History of blood clots, anemia,     Musculoskeletal: Comment: TMJ arthralgia, osteoporosis, gout, severe right hip OA  (+) arthritis,     GI/Hepatic: Comment: hx colo-uterine fistula s/p sigmoidectomy    (+) GERD,     Renal/Genitourinary:     (+) renal disease, type: CRI,     Endo:     (+) type II DM, Using insulin, Chronic steroid usage for     Psychiatric/Substance Use:     (+) alcohol abuse     Infectious Disease:       Malignancy:       Other:      (+) , H/O Chronic Pain,        Physical Exam    Airway        Mallampati: II   TM distance: > 3 FB   Neck ROM: full   Mouth opening: > 3 cm    Respiratory Devices and Support         Dental  no notable dental history         Cardiovascular   cardiovascular exam normal          Pulmonary           breath sounds clear to auscultation           OUTSIDE LABS:  CBC:   Lab Results   Component Value Date    WBC 12.1 (H) 05/25/2022    WBC 7.9 05/16/2022    HGB 9.2 (L) 05/25/2022    HGB 9.5 (L) 05/16/2022    HCT 30.6 (L) 05/25/2022    HCT 32.5 (L) 05/16/2022     05/25/2022     05/16/2022     BMP:   Lab Results   Component Value Date     05/25/2022     05/16/2022    POTASSIUM 4.2 05/25/2022    POTASSIUM 4.1 05/16/2022    CHLORIDE 108 05/25/2022    CHLORIDE 108 05/16/2022    CO2 24 05/25/2022    CO2 26 05/16/2022    BUN 21 05/25/2022    BUN 19 05/16/2022    CR 1.06 (H) 05/25/2022    CR 1.02 05/16/2022     (H) 05/31/2022     (H) 05/25/2022     COAGS:   Lab Results   Component Value Date    PTT 35 10/06/2011    INR 1.36 (H) 03/03/2020     POC:   Lab Results   Component Value Date     (H)  10/01/2020     HEPATIC:   Lab Results   Component Value Date    ALBUMIN 3.3 (L) 02/11/2022    PROTTOTAL 7.3 12/04/2020    ALT 17 02/11/2022    AST 15 02/11/2022    ALKPHOS 95 12/04/2020    BILITOTAL 0.6 12/04/2020     OTHER:   Lab Results   Component Value Date    PH 7.38 04/06/2017    LACT 2.3 (H) 04/06/2017    A1C 5.9 (H) 05/25/2022    CLAUDY 8.9 05/25/2022    PHOS 3.4 04/11/2017    MAG 2.0 04/11/2017    LIPASE 136 04/02/2017    TSH 2.22 12/09/2021    CRP 12.5 (H) 02/11/2022    SED 36 (H) 02/11/2022       Anesthesia Plan    ASA Status:  3   NPO Status:  NPO Appropriate    Anesthesia Type: MAC.   Induction: Intravenous.   Maintenance: TIVA.        Consents    Anesthesia Plan(s) and associated risks, benefits, and realistic alternatives discussed. Questions answered and patient/representative(s) expressed understanding.     - Discussed: Risks, Benefits and Alternatives for BOTH SEDATION and the PROCEDURE were discussed     - Discussed with:  Patient      - Extended Intubation/Ventilatory Support Discussed: No.      - Patient is DNR/DNI Status: No    Use of blood products discussed: No .     Postoperative Care    Pain management: Oral pain medications.   PONV prophylaxis: Ondansetron (or other 5HT-3)     Comments:                John Hui MD

## 2022-05-31 NOTE — ANESTHESIA POSTPROCEDURE EVALUATION
Patient: Betty Tee    Procedure: Procedure(s):  ESOPHAGOGASTRODUODENOSCOPY, WITH LESION ABLATION  ESOPHAGOGASTRODUODENOSCOPY, WITH HEMORRHAGE CONTROL       Anesthesia Type:  MAC    Note:  Disposition: Outpatient   Postop Pain Control: Uneventful            Sign Out: Well controlled pain   PONV: No   Neuro/Psych: Uneventful            Sign Out: Acceptable/Baseline neuro status   Airway/Respiratory: Uneventful            Sign Out: Acceptable/Baseline resp. status   CV/Hemodynamics: Uneventful            Sign Out: Acceptable CV status; No obvious hypovolemia; No obvious fluid overload   Other NRE: NONE   DID A NON-ROUTINE EVENT OCCUR? No           Last vitals:  Vitals Value Taken Time   /60 05/31/22 1113   Temp     Pulse 66 05/31/22 1113   Resp     SpO2 94 % 05/31/22 1118   Vitals shown include unvalidated device data.    Electronically Signed By: John Hui MD  May 31, 2022  11:20 AM

## 2022-05-31 NOTE — OR NURSING
Pt had EGD with RFA (through the channel) REF TTS-1100 LOT [id number] and APC, under MAC. PT label and covidien sticker placed on RFA charge sheet. Pt tolerated procedure well. Pt stable at time of transfer to . C/o hip pain, baseline. Report given to Shruthi GOMEZ on 3C.

## 2022-05-31 NOTE — ANESTHESIA CARE TRANSFER NOTE
Patient: Betty Tee    Procedure: Procedure(s):  ESOPHAGOGASTRODUODENOSCOPY, WITH LESION ABLATION  ESOPHAGOGASTRODUODENOSCOPY, WITH HEMORRHAGE CONTROL       Diagnosis: Gastroesophageal reflux disease without esophagitis [K21.9]  Diagnosis Additional Information: No value filed.    Anesthesia Type:   MAC     Note:    Oropharynx: oropharynx clear of all foreign objects and spontaneously breathing  Level of Consciousness: drowsy  Oxygen Supplementation: room air    Independent Airway: airway patency satisfactory and stable  Dentition: dentition unchanged  Vital Signs Stable: post-procedure vital signs reviewed and stable  Report to RN Given: handoff report given  Patient transferred to: Phase II    Handoff Report: Identifed the Patient, Identified the Reponsible Provider, Reviewed the pertinent medical history, Discussed the surgical course, Reviewed Intra-OP anesthesia mangement and issues during anesthesia, Set expectations for post-procedure period and Allowed opportunity for questions and acknowledgement of understanding      Vitals:  Vitals Value Taken Time   /56 05/31/22 1111   Temp 98.2    Pulse 65 05/31/22 1111   Resp 20    SpO2 97 % 05/31/22 1113   Vitals shown include unvalidated device data.    Electronically Signed By: LIZY Corrigan CRNA  May 31, 2022  11:15 AM

## 2022-06-01 ENCOUNTER — PRE VISIT (OUTPATIENT)
Dept: SURGERY | Facility: CLINIC | Age: 82
End: 2022-06-01
Payer: MEDICARE

## 2022-06-01 NOTE — PROGRESS NOTES
Gastroenterology Brief Note:    I am the on call fellow and received a call regarding this patient.    In brief, Ms. Tee is an 82-year-old woman who underwent EGD today.  Her friend calls in reporting nausea with 4 episodes of non-bloody emesis tonight after eating.  She is able to have some liquids.  She has no pain.   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  This was performed under MAC.    She has no red flag symptoms or pain currently.  I encouraged her to monitor her symptoms closely, drink fluids and she requested to take peptobismol which I believe would be OK.  If her symptoms worsen, or if she develops pain or the vomiting is not improving, she should call back in and/or go to the ED. She and her friend verbalize agreement with this.    Yonny Chaves MD  Gastroenterology Fellow, PGY5

## 2022-06-03 ENCOUNTER — MYC MEDICAL ADVICE (OUTPATIENT)
Dept: FAMILY MEDICINE | Facility: CLINIC | Age: 82
End: 2022-06-03
Payer: MEDICARE

## 2022-06-03 DIAGNOSIS — K92.2 LOWER GI BLEED: Primary | ICD-10-CM

## 2022-06-03 DIAGNOSIS — K31.819 GAVE (GASTRIC ANTRAL VASCULAR ECTASIA): Primary | ICD-10-CM

## 2022-06-03 DIAGNOSIS — K31.819 GAVE (GASTRIC ANTRAL VASCULAR ECTASIA): ICD-10-CM

## 2022-06-03 DIAGNOSIS — K29.41 CHRONIC ATROPHIC GASTRITIS WITH BLEEDING: ICD-10-CM

## 2022-06-04 ENCOUNTER — HEALTH MAINTENANCE LETTER (OUTPATIENT)
Age: 82
End: 2022-06-04

## 2022-06-06 ENCOUNTER — TELEPHONE (OUTPATIENT)
Dept: PALLIATIVE MEDICINE | Facility: CLINIC | Age: 82
End: 2022-06-06
Payer: MEDICARE

## 2022-06-06 NOTE — TELEPHONE ENCOUNTER
CW,    Please see [employer] message.  Order T'd up.    Thanks,  Sophia Hemphill RN

## 2022-06-09 DIAGNOSIS — E11.65 TYPE 2 DIABETES MELLITUS WITH HYPERGLYCEMIA, WITH LONG-TERM CURRENT USE OF INSULIN (H): ICD-10-CM

## 2022-06-09 DIAGNOSIS — Z79.4 TYPE 2 DIABETES MELLITUS WITH HYPERGLYCEMIA, WITH LONG-TERM CURRENT USE OF INSULIN (H): ICD-10-CM

## 2022-06-09 RX ORDER — INSULIN GLARGINE 100 [IU]/ML
INJECTION, SOLUTION SUBCUTANEOUS
Qty: 15 ML | Refills: 0
Start: 2022-06-09 | End: 2022-07-05

## 2022-06-09 NOTE — TELEPHONE ENCOUNTER
Last OV: 4/12/22    Next OV: PRN    TX plan: Edda SHERIDAN RN Care Coordinator  [facility]

## 2022-06-09 NOTE — TELEPHONE ENCOUNTER
Called pt to discuss labs and symptom progression s/p recent EGD.    Anemia- had second EGD procedure on 5/31/22.  More gastric antral area ablation done.  Since procedure...  Stools have very slowly been going from black, black to brown/black.  Slower improvement compared to the first EGD.  No stools for a couple days now.  Breathing/energy sx's are okay.  GI team asked us to order follow-up hgb- will do that.  GI team called pt after appt and advised a Watchman's procedure from GI, and the news really made her very emotional and worried. She thinks she does have a follow-up appt with GI coming up to discuss further, and will bring both Jeans along to that meeting to help her process the information and help make decision.  In meantime, I will order the follow-up hgb/labs to be done 2 wks after the EGD.    Also reviewed A1C of 5.9 from her last appt, down from 6.4 in 12/21, and 7-8's in spring/summer of '21.  Somewhat surprising as we had lowered her basaglar from 32 to 28 in 3/22.  She notes her appetite has been less.  Wt down to 182 lbs at her home scale today.  GI nurse mentioned- your stomach doesn't like blood, and she thinks that's probably correct.    Plan-   Will lower Basaglar down from 28 to 24 units mendez.  Pre-meal glucose levels have been good.  Will cont 11 units novolog with meals.  She'll get labs done next week for f/u hgb/cbc/ferritin.  Call/msg if any worsening sx's in the meantime.  Follow-up with GI to discuss next steps, and cont communications with cardiology.

## 2022-06-13 ENCOUNTER — TELEPHONE (OUTPATIENT)
Dept: GASTROENTEROLOGY | Facility: CLINIC | Age: 82
End: 2022-06-13
Payer: MEDICARE

## 2022-06-13 NOTE — TELEPHONE ENCOUNTER
Screening Questions  BlueKIND OF PREP RedLOCATION [review exclusion criteria] GreenSEDATION TYPE      1. Are you able to give consent for your medical care? Do you have a legal guardian or medical Power of ?  Y (Sedation review/consideration needed)    2. Have you had a positive covid test in the last 90 days? N  a. If yes, what date?N    3. Are you active on mychart? Y    4. What insurance is in the chart? BCBS     3.   Ordering/Referring Provider: Guru Winsome Dias MD in [facility]    4. BMI 30.0 [BMI OVER 40-EXTENDED PREP]  If greater than 40 review exclusion criteria [PAC APPT IF @ [facility]]        5.  Respiratory Screening :  [If yes to any of the following HOSPITAL setting only]     Do you use daily home oxygen? N    Do you have mod to severe Obstructive Sleep Apnea? Y  [OKAY @ [facility] SH PH RI]   Do you have Pulmonary Hypertension? N     Do you have UNCONTROLLED asthma? N        6.   Have you had a heart or lung transplant? N      7.   Are you currently on dialysis? N [ If yes, G-PREP & HOSPITAL setting only]     8.   Do you have chronic kidney disease? N [ If yes, G-PREP ]    9.   Have you had a stroke or Transient ischemic attack (TIA - aka  mini stroke ) within 6 months?  N (If yes, please review exclusion criteria)    10.   In the past 6 months, have you had any heart related issues including cardiomyopathy or heart attack? N           If yes, did it require cardiac stenting or other implantable device? N      11.   Do you have any implantable devices in your body (pacemaker, defib, LVAD)? N (If yes, please review exclusion criteria)    12.   Do you take nitroglycerin? N           If yes, how often? N  (if yes, HOSPITAL setting ONLY)    13.   Are you currently taking any blood thinners? Y, will get a plan           [IF YES, INFORM PATIENT TO FOLLOW UP W/ ORDERING PROVIDER FOR BRIDGING INSTRUCTIONS]     14.   Do you have a diagnosis of diabetes? Y   [ If yes, G-PREP  ]    15.   [FEMALES] Are you currently pregnant?     If yes, how many weeks?     16.   Are you taking any prescription pain medications on a routine schedule?  N  [ If yes, EXTENDED PREP.] [If yes, MAC]    17.   Do you have any chemical dependencies such as alcohol, street drugs, or methadone?  N [If yes, MAC]    18.   Do you have any history of post-traumatic stress syndrome, severe anxiety or history of psychosis?  N  [If yes, MAC]    19.   Do you transfer independently?  Y, MAY NEED ASSISTANCE OF ONE, PT USES WALKER    20.  On a regular basis do you go 3-5 days between bowel movements?    [ If yes, EXTENDED PREP.]    21.   Preferred LOCAL Pharmacy for Pre Prescription   [id number] - [place], [place] - [address]      Scheduling Details    Caller : THERESA OLSON  (Please ask for phone number if not scheduled by patient)    Type of Procedure Scheduled: EGD  Which Colonoscopy Prep was Sent?:   CARLOSORUTS CF PATIENTS & GROEN'S PATIENTS REQUIRE EXTENDED PREP  Surgeon: Arun per order  Date of Procedure: 7/19  Location: [facility]      Sedation Type: MAC  Conscious Sedation- Needs  for 6 hours after the procedure  MAC/General-Needs  for 24 hours after procedure    Pre-op Required at [facility] [place] [place] and OR for MAC sedation: Y, PAC appt schedule virtual 7/13. Pt may cancel this instead going to PCP.   (advise patient they will need a pre-op prior to procedure -)      Informed patient they will need an adult  y  Cannot take any type of public or medical transportation alone    Pre-Procedure Covid test [facility] or Externally: home test    Confirmed Nurse will call to complete assessment Y    Additional comments: pt has had EGDs prior w/ Guru PAGE

## 2022-06-15 ENCOUNTER — LAB (OUTPATIENT)
Dept: LAB | Facility: CLINIC | Age: 82
End: 2022-06-15
Payer: MEDICARE

## 2022-06-15 DIAGNOSIS — K29.41 CHRONIC ATROPHIC GASTRITIS WITH BLEEDING: ICD-10-CM

## 2022-06-15 DIAGNOSIS — K31.819 GAVE (GASTRIC ANTRAL VASCULAR ECTASIA): ICD-10-CM

## 2022-06-15 LAB
ERYTHROCYTE [DISTWIDTH] IN BLOOD BY AUTOMATED COUNT: 14 % (ref 10–15)
FERRITIN SERPL-MCNC: 62 NG/ML (ref 8–252)
HCT VFR BLD AUTO: 34.7 % (ref 35–47)
HGB BLD-MCNC: 10.5 G/DL (ref 11.7–15.7)
MCH RBC QN AUTO: 28.9 PG (ref 26.5–33)
MCHC RBC AUTO-ENTMCNC: 30.3 G/DL (ref 31.5–36.5)
MCV RBC AUTO: 96 FL (ref 78–100)
PLATELET # BLD AUTO: 283 10E3/UL (ref 150–450)
RBC # BLD AUTO: 3.63 10E6/UL (ref 3.8–5.2)
WBC # BLD AUTO: 7 10E3/UL (ref 4–11)

## 2022-06-15 PROCEDURE — 36415 COLL VENOUS BLD VENIPUNCTURE: CPT | Performed by: PATHOLOGY

## 2022-06-15 PROCEDURE — 82728 ASSAY OF FERRITIN: CPT | Performed by: PATHOLOGY

## 2022-06-15 PROCEDURE — 85027 COMPLETE CBC AUTOMATED: CPT | Performed by: PATHOLOGY

## 2022-06-15 NOTE — RESULT ENCOUNTER NOTE
Hgb appears to have improved to 10.5. Will continue monitoring Hgb and repeat EGD as previously discuused

## 2022-06-20 DIAGNOSIS — E11.69 TYPE 2 DIABETES MELLITUS WITH OTHER SPECIFIED COMPLICATION (H): ICD-10-CM

## 2022-06-21 RX ORDER — INSULIN ASPART 100 [IU]/ML
INJECTION, SOLUTION INTRAVENOUS; SUBCUTANEOUS
Qty: 15 ML | Refills: 1 | Status: SHIPPED | OUTPATIENT
Start: 2022-06-21 | End: 2022-10-07

## 2022-06-21 NOTE — TELEPHONE ENCOUNTER
Prescription approved per [facility] Refill Protocol.  Due for follow up in August.    Sophia Hemphill RN

## 2022-06-27 DIAGNOSIS — G89.29 OTHER CHRONIC PAIN: ICD-10-CM

## 2022-06-27 DIAGNOSIS — M16.11 OSTEOARTHRITIS OF RIGHT HIP, UNSPECIFIED OSTEOARTHRITIS TYPE: ICD-10-CM

## 2022-06-27 NOTE — TELEPHONE ENCOUNTER
Reason for call:  Medication   If this is a refill request, has the caller requested the refill from the pharmacy already? No  Will the patient be using a [facility]? No  Name of the pharmacy and phone number for the current request:   [id number] - [place], [place] - [address]  Name of the medication requested:      HYDROcodone-acetaminophen (NORCO) 7.5-325 MG per tablet         Phone number to reach patient:  Home number on file [phone] (home)  Can we leave a detailed message on this number?  YES     Travel screening: Not Applicable

## 2022-06-27 NOTE — TELEPHONE ENCOUNTER
New insulin dose 15 units before each meal.     If you feel the need to cut your dose because of low sugars, cut 2 units off the dose that was prior to the low sugar spell, and carry that new dose forward. If you're below 70, cut the current dose in half only once, then revert back to the usual dose for that time's shot. If you have any questions regarding FAM[employer]Ohart, you may call [employer] support at [phone]. Will route to MA pool for assistance with gathering opioid refill information.    Ilene SHERIDAN RN Care Coordinator  [facility]

## 2022-06-28 RX ORDER — HYDROCODONE BITARTRATE AND ACETAMINOPHEN 7.5; 325 MG/1; MG/1
1 TABLET ORAL EVERY 12 HOURS
Qty: 60 TABLET | Refills: 0 | Status: SHIPPED | OUTPATIENT
Start: 2022-06-28 | End: 2022-08-12

## 2022-06-28 NOTE — TELEPHONE ENCOUNTER
LVM, notified that prescription was sent to preferred pharmacy.    Able to fill  and start 06/28/22      Zayra Christy MA  [facility] [place]

## 2022-06-28 NOTE — TELEPHONE ENCOUNTER
Routing to provider to review medication prepped per below      norco 7.5-325, #60, Refill:no  Sig:OK to fill and start 06/28/22  Last picked up 05/16/22 with start on 05/16/22  Due: Anytime    Per last OV note 04/12/22:  1. Medication Management:   1. Continue Norco 7.5-325mg BID prn      Claudia LANDON, RN Care Coordinator  [facility]  Pain Management

## 2022-06-28 NOTE — TELEPHONE ENCOUNTER
reviewed, norco refsharmila approved.    Charly Moore [facility]

## 2022-06-28 NOTE — TELEPHONE ENCOUNTER
Received call from patient requesting refill(s) of HYDROcodone-acetaminophen (NORCO) 7.5-325 MG per tablet       Last dispensed from pharmacy on 05/16/22    Patient's last office/virtual visit by prescribing provider on 04/12/22  Next office/virtual appointment scheduled for 07/06/22    Last urine drug screen date 09/02/21  Current opioid agreement on file (completed within the last year) Yes Date of opioid agreement: 10/15/21    E-prescribe to   [facility]/pharmacy [facility][id number] - [place], [place] - [address] [address]  Phone: [phone] Fax: [phone]    Will route to nursing [facility] for review and preparation of prescription(s).       Zayra Christy MA  [facility]

## 2022-07-06 ENCOUNTER — OFFICE VISIT (OUTPATIENT)
Dept: PALLIATIVE MEDICINE | Facility: CLINIC | Age: 82
End: 2022-07-06
Payer: MEDICARE

## 2022-07-06 VITALS — HEART RATE: 58 BPM | OXYGEN SATURATION: 96 % | SYSTOLIC BLOOD PRESSURE: 138 MMHG | DIASTOLIC BLOOD PRESSURE: 72 MMHG

## 2022-07-06 DIAGNOSIS — M25.551 HIP PAIN, RIGHT: ICD-10-CM

## 2022-07-06 DIAGNOSIS — M16.11 OSTEOARTHRITIS OF RIGHT HIP, UNSPECIFIED OSTEOARTHRITIS TYPE: Primary | ICD-10-CM

## 2022-07-06 DIAGNOSIS — M79.18 MYOFASCIAL PAIN: ICD-10-CM

## 2022-07-06 PROCEDURE — 99215 OFFICE O/P EST HI 40 MIN: CPT | Performed by: PHYSICAL MEDICINE & REHABILITATION

## 2022-07-06 NOTE — NURSING NOTE
PEG Score 2/25/2022 4/12/2022 7/6/2022   PEG Total Score 3.33 1 6.67         Zayra Christy MA  [facility] [place]

## 2022-07-06 NOTE — PATIENT INSTRUCTIONS
Order placed for a repeat hip joint injection. You will be called to schedule.  Continue utilizing topical medication over the painful area on the right posterior hip.   You can try using a tennis ball against the wall to massage the painful area in the right buttock  Follow up with me 2-3 weeks after the injection. We can talk about other medication options at that time. Consider trying buprenorphine (Butran's) for chronic pain. In the meantime if the muscle relaxant is not helping you can stop that.   Alanna Alba MD  [facility]     ----------------------------------------------------------------  Clinic Number:  [phone]   Call with any questions about your care and for scheduling assistance.   Calls are returned Monday through Friday between 8 AM and 4:30 PM. We usually get back to you within 2 business days depending on the issue/request.    If we are prescribing your medications:  For opioid medication refills, call the clinic or send a [employer] message 7 days in advance.  Please include:  Name of requested medication  Name of the pharmacy.  For non-opioid medications, call your pharmacy directly to request a refill. Please allow 3-4 days to be processed.   Per [place] State Law:  All controlled substance prescriptions must be filled within 30 days of being written.    For those controlled substances allowing refills, pickup must occur within 30 days of last fill.      We believe regular attendance is key to your success in our program!    Any time you are unable to keep your appointment we ask that you call us at least 24 hours in advance to cancel.This will allow us to offer the appointment time to another patient.   Multiple missed appointments may lead to dismissal from the clinic.

## 2022-07-06 NOTE — PROGRESS NOTES
[facility] Follow Up      Date of visit: 7/6/2022     This is a transfer patient from Dr. Moore. Last seen by Dr. Moore on 4/12/2022.        Assessment:  Sister Betty Buckner is a 82 year old medically complex patient with past medical history including: Atrial fibrillation, VLH, History of DVT, CHF, HLD, HTN, DM 2, Stage 3 CKD, RLS, Depression, History of etoh abuse (remission 30+ years) with the following chronic pain conditions:     1. Right sided hip and leg pain: Betty reports a long standing history of right hip and leg pain. They've been having worsening right lateral hip and groin pain. They also have a long standing history of pain that starts in her right low back/upper buttock and radiates laterally and posteriorly down the right leg to the foot. Initially was getting improvement with piriformis injection but declining benefit over time. Significant improvement noted with hip injection on 3/9/22. Appears that their hip pain is likely due to a combination of right sided gluteal weakness/dysfunction and severe right hip OA.     Plan:  1. Physical Therapy: chronic pain PT on hold for now due to other medical appointments.   2. Clinical Health Pain Psychologist: Not at this time.   3. Diagnostic Studies: None at this time.  4. Medication Management:   1. Continue Norco 7.5-325mg BID prn.  2. Continue methocarbamol 750mg TID prn.   3. Schedule tylenol 1000mg TID prn while pain is severe.  4. CSA/UDS - 9/2/21  5. Further procedures recommended:   1. Right hip joint injection can be repeated every 3 months. Order placed for repeat injection  6. Follow up: for injection      Chief complaint:       Chief Complaint   Patient presents with     Pain      Since her last visit, Betty Tee reports:  - She is still having right hip and leg pain. She had a right hip injection on 3/9/22 which she thinks was helpful. She was also getting piriformis injections for right hip and leg  pain so she is not certain which injections helped more with her current pain. She thinks it may have been the intra-articular injection that was more helpful.   - states she had a significant flare of pain from the last injection. Was painful during the procedure and after as well.   - she has norco at home to use for pain but does not like to take pain medication.  - she was working with [facility] in PT but stopped because she has too many medical appointments right now.   - she needs to have a cardiac procedure done so waiting for that to be completed.     Medications:       Current pain medications:  -Tylenol 1000mg q8h prn - Usually takes 1-2 times a day  -Escitalopram 20mg daily              -Norco 7.5-325mg prn - takes it but not regularly              -methocarbamol 750mg tid prn              -Voltaren 1% gel qid prn     Current calculated MME: 15          Previous pain medications:  -Tramadol 50mg prn  -Tizanidine, flexeril - nh              -Gabapentin 300mg TID - memory difficulty     Past pain treatments:  Physical Therapy: hasn't completed PT for low back or hip pain                TENS unit: hasn't tried  Pain psychology: hasn't tried  Surgery: Lumbar spine surgery in the 1960s.   Injections:   -Right piriformis injection on 4/1/21 with 60% relief for 8 weeks. Repeated in June with approx 50% relief.  -Two epidural injections (right L5 tfesi), feb 2020 was helpful the next day but she had a fall right after, aug 2020 procedure - not helpful, had worsening pain  Alternative Therapies:               Chiropractic: hasn't tried               Acupuncture: hasn't tried     Medications:  Current Outpatient Prescriptions          Current Outpatient Medications   Medication Sig Dispense Refill     ACCU-CHEK SHANNON PLUS test strip USE TO TEST BLOOD SUGARS 3 TIMES DAILY 300 strip 5     acetaminophen (TYLENOL) 500 MG tablet Take 1,000 mg by mouth every 8 hours as needed (max 6 tablets/24 hours, 2 tablets/dose)           acyclovir (ZOVIRAX) 400 MG tablet Take 1 tablet (400 mg) by mouth 3 times daily For a couple days 15 tablet 2     acyclovir (ZOVIRAX) 5 % external ointment Apply topically 6 times daily As needed for outbreaks 15 g 3     albuterol (PROAIR HFA/PROVENTIL HFA/VENTOLIN HFA) 108 (90 Base) MCG/ACT inhaler Inhale 2 puffs into the lungs every 6 hours 18 g 11     alendronate (FOSAMAX) 70 MG tablet Take 1 tablet (70 mg) by mouth every 7 days 12 tablet 0     anakinra (KINERET) 100 MG/0.67ML SOSY injection 100 mg every day x 3 days as needed for flare ups 6.7 mL 3     atorvastatin (LIPITOR) 10 MG tablet TAKE 1/2 TABLET BY MOUTH EVERY DAY 45 tablet 2     azithromycin (ZITHROMAX) 250 MG tablet TAKE 1 TABLET BY MOUTH EVERY DAY 30 tablet 5     BD PEN NEEDLE JANE 2ND GEN 32G X 4 MM miscellaneous USE 4 DAILY AS DIRECTED. 400 each 1     bisacodyl (DULCOLAX) 5 MG EC tablet Take as directed. One day before exam take 2 tablets at 3 PM. Take 2 tablets at 11 PM. 4 tablet 0     blood glucose (NO BRAND SPECIFIED) lancets standard Use to test blood sugar 3 times daily or as directed 100 lancet 3     cevimeline (EVOXAC) 30 MG capsule Take 1 capsule (30 mg) by mouth 3 times daily (Patient taking differently: Take 30 mg by mouth every other day) 270 capsule 2     Cholecalciferol (VITAMIN D3) 50 MCG (2000 UT) CAPS Take 100 mcg by mouth daily (Take 2 tablet (50 mcg) by mouth daily - Oral) 90 capsule 1     COMPOUNDED NON-CONTROLLED SUBSTANCE (CMPD RX) - PHARMACY TO MIX COMPOUNDED MEDICATION Estriol 1 mg/g Apply small amount to finger and apply to inside vagina daily for 2 weeks then twice weekly Route: vaginally Dispense 30 grams 11 refills (Patient taking differently: Estriol 1 mg/g Apply small amount to finger and apply to inside vagina twice weekly Route: vaginally Dispense 30 grams 11 refills) 30 g 11     cyanocobalamin (VITAMIN B-12) 1000 MCG tablet Take 1 tablet (1,000 mcg) by mouth daily 30 tablet 1     escitalopram (LEXAPRO) 20 MG tablet  TAKE 1 TABLET BY MOUTH EVERY DAY 90 tablet 0     ferrous sulfate (FEROSUL) 325 (65 Fe) MG tablet TAKE 1 TABLET BY MOUTH EVERY DAY WITH BREAKFAST 90 tablet 0     fluticasone (FLONASE) 50 MCG/ACT nasal spray SPRAY 1-2 SPRAYS INTO BOTH NOSTRILS DAILY AS NEEDED FOR ALLERGIES 16 mL 11     fluticasone-vilanterol (BREO ELLIPTA) 100-25 MCG/INH inhaler Inhale 1 puff into the lungs daily 1 each 11     HYDROcodone-acetaminophen (NORCO) 7.5-325 MG per tablet Take 1 tablet by mouth every 12 hours OK to fill and start 06/28/22 60 tablet 0     hydroxychloroquine (PLAQUENIL) 200 MG tablet Take 2 tablets (400 mg) by mouth daily Get annual eye exams for hydroxychloroquine (Plaquenil) monitoring and fax to [phone] 180 tablet 3     insulin glargine (BASAGLAR KWIKPEN) 100 UNIT/ML pen INJECT 22 UNITS SUBCUTANEOUS DAILY (TO REPLACE LANTUS) 15 mL 0     ketoconazole (NIZORAL) 2 % external cream Apply topically 2 times daily as needed for itching 60 g 1     KLOR-CON 20 MEQ CR tablet TAKE 2 TABLETS (40 MEQ) BY MOUTH EVERY MORNING AND 1 TABLET (20 MEQ) EVERY EVENING. 270 tablet 3     lidocaine (LIDODERM) 5 % patch APPLY PATCH TO PAINFUL AREA FOR UP TO 12 H WITHIN A 24 H PERIOD. REMOVE AFTER 12 HOURS. (Patient taking differently: Apply patch to painful area as needed for up to 12 h within a 24 h period.  Remove after 12 hours.) 30 patch 2     loratadine (CLARITIN) 10 MG tablet TAKE 1 TABLET BY MOUTH EVERY DAY 90 tablet 3     methocarbamol (ROBAXIN) 750 MG tablet Take 1 tablet (750 mg) by mouth 3 times daily as needed for muscle spasms 90 tablet 3     metoprolol succinate ER (TOPROL-XL) 50 MG 24 hr tablet Take 1 tablet (50 mg) by mouth 2 times daily 90 tablet 3     montelukast (SINGULAIR) 10 MG tablet TAKE 1 TABLET BY MOUTH EVERYDAY AT BEDTIME 90 tablet 0     NOVOLOG FLEXPEN 100 UNIT/ML soln INJECT 12 UNITS SUBCUTANEOUS 3 TIMES DAILY (WITH MEALS) 15 mL 1     pantoprazole (PROTONIX) 40 MG EC tablet Take 1 tablet (40 mg) by mouth daily  (replaces famotidine- should stop taking famotidine) 90 tablet 1     polyethylene glycol (GOLYTELY) 236 g suspension Take as directed. One day before exam fill the jug with water. Cover and shake until well mixed. At 6 PM start drinking an 8oz glass of mixture every 15 minutes until jug is 1/2 empty. Store remainder in the refrigerator.  At 11 PM Start drinking the other half of the Golytely jug. Drink one 8-ounce glass every 15 minutes until the jug is empty. (Patient taking differently: Take as directed. One day before exam fill the jug with water. Cover and shake until well mixed. At 6 PM start drinking an 8oz glass of mixture every 15 minutes until jug is 1/2 empty. Store remainder in the refrigerator.  At 11 PM Start drinking the other half of the Golytely jug. Drink one 8-ounce glass every 15 minutes until the jug is empty.) 4000 mL 0     predniSONE (DELTASONE) 5 MG tablet Take 1 tablet (5 mg) by mouth daily 30 tablet 0     rivaroxaban ANTICOAGULANT (XARELTO ANTICOAGULANT) 20 MG TABS tablet TAKE 1 TABLET BY MOUTH DAILY WITH DINNER 90 tablet 3     Semaglutide, 1 MG/DOSE, (OZEMPIC, 1 MG/DOSE,) 4 MG/3ML SOPN Inject 1 mg Subcutaneous once a week (Patient taking differently: Inject 1 mg Subcutaneous once a week Mondays) 9 mL 1     spironolactone (ALDACTONE) 25 MG tablet Take 2 tablets (50 mg) by mouth daily 180 tablet 3     torsemide (DEMADEX) 10 MG tablet TAKE ONE TAB (10 MG) WITH ONE 20 MG TAB TO = 30 MG DAILY IN AFTERNOON. 90 tablet 3     torsemide (DEMADEX) 20 MG tablet TAKE 2 TABLETS (40 MG) BY MOUTH EVERY MORNING AND 1 TABLET (20 MG) DAILY AT 2 PM. TAKE THE AFTERNOON DOSE WITH AN EXTRA 10 MG TAB FOR A TOTAL OF 30 MG IN THE AFTERNOON 270 tablet 3     traZODone (DESYREL) 50 MG tablet Take 3 tablets (150 mg) by mouth nightly as needed for sleep 135 tablet 1           Diagnostic tests:  X-ray of right hip on 12/9/21 shows:  FINDINGS: AP and frog-leg lateral right hip views were obtained.      No acute osseous  abnormality. Severe degenerative changes of the right  hip, progressed compared to prior. Vascular ossifications. Lumbar  spondylosis. Right SI joint degenerative change.      CSA and UDS due - 10/2022        Physical Exam:  /72   Pulse 58   SpO2 96%   General: A&O x 3  Gait:     Antalgic and slow  MSK exam: tender to palpation of right gluteal musculature     Alanna Alba MD  [facility]      BILLING TIME DOCUMENTATION:   The total TIME spent on this patient on the date of the encounter/appointment was 40 minutes.       TOTAL TIME includes:   Time spent preparing to see the patient (reviewing records and tests)   Time spent face to face (or over the phone) with the patient   Time spent ordering tests, medications, procedures and referrals   Time spent documenting clinical information in Epic

## 2022-07-08 ENCOUNTER — LAB (OUTPATIENT)
Dept: LAB | Facility: CLINIC | Age: 82
End: 2022-07-08
Payer: MEDICARE

## 2022-07-08 ENCOUNTER — OFFICE VISIT (OUTPATIENT)
Dept: RHEUMATOLOGY | Facility: CLINIC | Age: 82
End: 2022-07-08
Attending: INTERNAL MEDICINE
Payer: MEDICARE

## 2022-07-08 VITALS
WEIGHT: 188 LBS | SYSTOLIC BLOOD PRESSURE: 121 MMHG | TEMPERATURE: 97.7 F | BODY MASS INDEX: 30.36 KG/M2 | RESPIRATION RATE: 18 BRPM | DIASTOLIC BLOOD PRESSURE: 72 MMHG | OXYGEN SATURATION: 98 % | HEART RATE: 55 BPM

## 2022-07-08 DIAGNOSIS — M53.3 SACRAL BACK PAIN: ICD-10-CM

## 2022-07-08 DIAGNOSIS — D64.9 ANEMIA, UNSPECIFIED TYPE: ICD-10-CM

## 2022-07-08 DIAGNOSIS — M06.00 SERONEGATIVE RHEUMATOID ARTHRITIS (H): Primary | ICD-10-CM

## 2022-07-08 DIAGNOSIS — M19.90 INFLAMMATORY ARTHRITIS: ICD-10-CM

## 2022-07-08 DIAGNOSIS — Z79.899 LONG-TERM USE OF PLAQUENIL: ICD-10-CM

## 2022-07-08 DIAGNOSIS — M10.9 GOUT, UNSPECIFIED CAUSE, UNSPECIFIED CHRONICITY, UNSPECIFIED SITE: ICD-10-CM

## 2022-07-08 DIAGNOSIS — M06.00 SERONEGATIVE RHEUMATOID ARTHRITIS (H): ICD-10-CM

## 2022-07-08 DIAGNOSIS — E55.9 VITAMIN D DEFICIENCY: ICD-10-CM

## 2022-07-08 DIAGNOSIS — M35.02 SJOGREN'S SYNDROME WITH LUNG INVOLVEMENT (H): ICD-10-CM

## 2022-07-08 LAB
ALBUMIN SERPL-MCNC: 3.4 G/DL (ref 3.4–5)
ALBUMIN UR-MCNC: NEGATIVE MG/DL
ALT SERPL W P-5'-P-CCNC: 10 U/L (ref 0–50)
APPEARANCE UR: CLEAR
AST SERPL W P-5'-P-CCNC: 18 U/L (ref 0–45)
BASOPHILS # BLD AUTO: 0 10E3/UL (ref 0–0.2)
BASOPHILS NFR BLD AUTO: 0 %
BILIRUB UR QL STRIP: NEGATIVE
COLOR UR AUTO: YELLOW
CREAT SERPL-MCNC: 1.1 MG/DL (ref 0.52–1.04)
CREAT UR-MCNC: 23 MG/DL
CRP SERPL-MCNC: 10.7 MG/L (ref 0–8)
EOSINOPHIL # BLD AUTO: 0.1 10E3/UL (ref 0–0.7)
EOSINOPHIL NFR BLD AUTO: 2 %
ERYTHROCYTE [DISTWIDTH] IN BLOOD BY AUTOMATED COUNT: 14.7 % (ref 10–15)
ERYTHROCYTE [SEDIMENTATION RATE] IN BLOOD BY WESTERGREN METHOD: 30 MM/HR (ref 0–30)
FERRITIN SERPL-MCNC: 70 NG/ML (ref 8–252)
GFR SERPL CREATININE-BSD FRML MDRD: 50 ML/MIN/1.73M2
GLUCOSE UR STRIP-MCNC: NEGATIVE MG/DL
HCT VFR BLD AUTO: 36.6 % (ref 35–47)
HGB BLD-MCNC: 11.1 G/DL (ref 11.7–15.7)
HGB UR QL STRIP: ABNORMAL
HYALINE CASTS: 1 /LPF
IMM GRANULOCYTES # BLD: 0 10E3/UL
IMM GRANULOCYTES NFR BLD: 0 %
IRON SATN MFR SERPL: 84 % (ref 15–46)
IRON SERPL-MCNC: 236 UG/DL (ref 35–180)
KETONES UR STRIP-MCNC: NEGATIVE MG/DL
LEUKOCYTE ESTERASE UR QL STRIP: ABNORMAL
LYMPHOCYTES # BLD AUTO: 0.8 10E3/UL (ref 0.8–5.3)
LYMPHOCYTES NFR BLD AUTO: 8 %
Lab: NORMAL
MCH RBC QN AUTO: 28.8 PG (ref 26.5–33)
MCHC RBC AUTO-ENTMCNC: 30.3 G/DL (ref 31.5–36.5)
MCV RBC AUTO: 95 FL (ref 78–100)
MONOCYTES # BLD AUTO: 0.8 10E3/UL (ref 0–1.3)
MONOCYTES NFR BLD AUTO: 8 %
NEUTROPHILS # BLD AUTO: 7.8 10E3/UL (ref 1.6–8.3)
NEUTROPHILS NFR BLD AUTO: 82 %
NITRATE UR QL: NEGATIVE
NRBC # BLD AUTO: 0 10E3/UL
NRBC BLD AUTO-RTO: 0 /100
PERFORMING LABORATORY: NORMAL
PH UR STRIP: 6 [PH] (ref 5–7)
PLATELET # BLD AUTO: 237 10E3/UL (ref 150–450)
PROT UR-MCNC: 0.08 G/L
PROT/CREAT 24H UR: 0.35 G/G CR (ref 0–0.2)
RBC # BLD AUTO: 3.85 10E6/UL (ref 3.8–5.2)
RBC URINE: 6 /HPF
SP GR UR STRIP: 1.01 (ref 1–1.03)
SQUAMOUS EPITHELIAL: <1 /HPF
TEST NAME: NORMAL
TIBC SERPL-MCNC: 282 UG/DL (ref 240–430)
TOTAL PROTEIN SERUM FOR ELP: 6.9 G/DL (ref 6.4–8.3)
URATE SERPL-MCNC: 7.7 MG/DL (ref 2.6–6)
UROBILINOGEN UR STRIP-MCNC: NORMAL MG/DL
VIT B12 SERPL-MCNC: 1238 PG/ML (ref 193–986)
WBC # BLD AUTO: 9.6 10E3/UL (ref 4–11)
WBC URINE: 5 /HPF

## 2022-07-08 PROCEDURE — 99214 OFFICE O/P EST MOD 30 MIN: CPT | Performed by: INTERNAL MEDICINE

## 2022-07-08 PROCEDURE — 84450 TRANSFERASE (AST) (SGOT): CPT | Performed by: PATHOLOGY

## 2022-07-08 PROCEDURE — 86225 DNA ANTIBODY NATIVE: CPT | Performed by: PATHOLOGY

## 2022-07-08 PROCEDURE — 81001 URINALYSIS AUTO W/SCOPE: CPT | Performed by: PATHOLOGY

## 2022-07-08 PROCEDURE — 99000 SPECIMEN HANDLING OFFICE-LAB: CPT | Performed by: PATHOLOGY

## 2022-07-08 PROCEDURE — 84156 ASSAY OF PROTEIN URINE: CPT | Performed by: PATHOLOGY

## 2022-07-08 PROCEDURE — 85652 RBC SED RATE AUTOMATED: CPT | Performed by: PATHOLOGY

## 2022-07-08 PROCEDURE — 84165 PROTEIN E-PHORESIS SERUM: CPT | Performed by: PATHOLOGY

## 2022-07-08 PROCEDURE — G0463 HOSPITAL OUTPT CLINIC VISIT: HCPCS

## 2022-07-08 PROCEDURE — 85025 COMPLETE CBC W/AUTO DIFF WBC: CPT | Performed by: PATHOLOGY

## 2022-07-08 PROCEDURE — 82607 VITAMIN B-12: CPT | Performed by: PATHOLOGY

## 2022-07-08 PROCEDURE — 86140 C-REACTIVE PROTEIN: CPT | Performed by: PATHOLOGY

## 2022-07-08 PROCEDURE — 84155 ASSAY OF PROTEIN SERUM: CPT | Performed by: PATHOLOGY

## 2022-07-08 PROCEDURE — 86160 COMPLEMENT ANTIGEN: CPT | Performed by: PATHOLOGY

## 2022-07-08 PROCEDURE — 86235 NUCLEAR ANTIGEN ANTIBODY: CPT | Performed by: PATHOLOGY

## 2022-07-08 PROCEDURE — 83516 IMMUNOASSAY NONANTIBODY: CPT | Performed by: PATHOLOGY

## 2022-07-08 PROCEDURE — 82040 ASSAY OF SERUM ALBUMIN: CPT | Performed by: PATHOLOGY

## 2022-07-08 PROCEDURE — 84550 ASSAY OF BLOOD/URIC ACID: CPT | Performed by: PATHOLOGY

## 2022-07-08 PROCEDURE — 82565 ASSAY OF CREATININE: CPT | Performed by: PATHOLOGY

## 2022-07-08 PROCEDURE — 83550 IRON BINDING TEST: CPT | Performed by: PATHOLOGY

## 2022-07-08 PROCEDURE — 36415 COLL VENOUS BLD VENIPUNCTURE: CPT | Performed by: PATHOLOGY

## 2022-07-08 PROCEDURE — 82728 ASSAY OF FERRITIN: CPT | Performed by: PATHOLOGY

## 2022-07-08 PROCEDURE — 84460 ALANINE AMINO (ALT) (SGPT): CPT | Performed by: PATHOLOGY

## 2022-07-08 RX ORDER — LIDOCAINE 50 MG/G
PATCH TOPICAL
Qty: 30 PATCH | Refills: 2 | Status: SHIPPED | OUTPATIENT
Start: 2022-07-08

## 2022-07-08 RX ORDER — HYDROXYCHLOROQUINE SULFATE 200 MG/1
400 TABLET, FILM COATED ORAL DAILY
Qty: 180 TABLET | Refills: 3 | Status: SHIPPED | OUTPATIENT
Start: 2022-07-08 | End: 2023-07-21

## 2022-07-08 ASSESSMENT — PAIN SCALES - GENERAL: PAINLEVEL: NO PAIN (0)

## 2022-07-08 NOTE — PATIENT INSTRUCTIONS
Tylenol extra strength 500 mg three times a day as needed in addition to your [place]    Make an appointment with Dr. Shannon [employer] today    Return in 6 months (in person)

## 2022-07-08 NOTE — PROGRESS NOTES
Rheumatology Clinic In Person Follow up Visit Note  Zulma Lopez MD  DOS: 2022  Date of last visit: 2022    Name: Betty Tee  MRN: [id number]  Age: 82 year old  : 1940  Reason for visit: Follow up visit for R hip pain sec severe OA, PMR, presumed gout flare of L foot, primary Sjogren's syndrome    # Sjogren's syndrome since  with borderline +RG, +SSA, and lip biopsy focus score of 1. Ongoing dry mouth on evoxac qod  # Follicular bronchiolitis, prior mild ILD   # Osteopenia on DEXA 2019 with T score=-2.1 with decline in bone density on fosamax  # Chronic prednisone use  # DM  # A fib  # Severe R hip OA  # PMR stable on prednisone 5 mg every day  #Presumed gout flare, recovered  #ESOB      Assessment and Plan:    New Dx of PMR. Severe R hip pain is due to severe OA based on 2020 hip MRI. She prefered to avoid hip arthroplasty in the past but it is quite bothersome and would like to see ortho. Her inflammation markers improved on prednisone, PMR responded to prednisone, now is 5 mg qd.     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 Completed pulmonary rehab in 2019 where she was able to exercise without oxygen. GFR stable.    On HCQ since 2019 with significant improvement of joint pain. No retinal toxicity on eye exam done 2021. Tolerates HCQ well.     Sister Betty recovered from flare of presumed gout (or pseudogout given previous nl SUA) over L foot. She responded to high dose prednisone (40-30-20-10 mg every day each for 3 days) in the past, now is on 5 mg every day.  Given her DM, osteopenia, highly recommend to start using anakinra prn for gout flares, no flares and has not required it yet.     She preferred in the past not to start daily urate-lowering therapy and her uric acid has been ~6 so allopurinol deferred.     Today 2022: Sister Betty's major  complaint at last visit was ESOB which is improved after Tx of B12 deficiency, iron deficiency due to GIB. 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 Her major complaint today is severe R hip pain due to OA which eventually requires R hip replacement, but she needs to be medically clear for surgery. Discussed pain mx and advised follow up with ortho.      Plan:    Prednisone 5 mg a day    Norco as needed for pain      Cevimeline for dry mouth could be taken 3 times a day (she takes it every other day as does not like to take so many pills)    Anakinra 100 mg a day x 3 days as needed for gout flares    Stay on fosamax weekly    Continue  mg bid    Yearly eye exam on HCQ    Labs     Tylenol extra strength 500 mg three times a day as needed in addition to your [place]    Make an appointment with Dr. Shannon ortho    Labs today    Return in 6 months (in person)          Orders Placed This Encounter   Procedures     ALT     Albumin level     AST     Creatinine     Complement C4     Complement C3     CRP inflammation     DNA double stranded antibodies     Erythrocyte sedimentation rate auto     UA with Microscopic reflex to Culture     Protein  random urine     Creatinine random urine     Vitamin B12     Centromere Antibody IgG     Scleroderma Antibody Scl70 CECILIA IgG     [facility]; RNA Polymerase III antibody IgG, any lab (Laboratory Miscellaneous Order)     Uric acid     Ferritin     Iron and iron binding capacity     CBC with Platelets & Differential     Protein electrophoresis       Zulma Lopez MD         HPI:   Betty Tee is a 81 year old WF with a history of primary Sjogren's syndrome, PMR, OA who presents for follow-up. She was diagnosed with Sjogren's syndrome in 2015 with borderline +RG, +SSA, and lip biopsy focus score of 1. Associated ILD and joint pain are prednisone-responsive which support the diagnosis.     Today 10/1/2021: No gout flares since last visit so has not  needed anakinra. She continues to have R hip pain related to severe OA but does not want to pursue surgery. She recently started pool therapy and began taking CBD oil yesterday. Is hoping to pursue medical marijuana in the future as she does not want to be dependent on norco. Aside from hip pain has otherwise been doing well.        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 She is on prednisone 5 mg a day, she was given prednisone taper recently for possible L foot gout flare which helped. Did not flare again to need using anakinra shots. She has severe R hip pain. Saw ortho, had R hip MRI on 9/5/2020 which showed severe OA, R hip arthroplasty was recommended. She would prefer to delay R hip arthroplasty, had R hip inj on 2/24, NSAIDs are not allowed with CKD. Norco helps but she does not like to be dependent on it, takes it rarely. No L foot pain today. Her hip/leg pain/back pain is getting better, going to PT, doing exercises at home, last time elbow massage caused pain. Epidural shot helped. Has dry mouth. SOB is stable at baseline.  Last 3 wk has been hard, her doctor took her off gabapentin, sweat/chills and loss of appetite. Reason was being on other meds. Now off gabapentin. Had several gushing bowel explosion, could not make it to the bathroom. Random, unpredictable. No triggers. Has had this problem for years.     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    No gout flares, has not required anakinra, it is in the fridge.    Has breathing issue, ESOB is worse. O2 level is good. Saw PCP, was recomemnded to do follow up with cardiology.    Getting R hip steroid inj, last one was for bursitis. Still hurts crissy today. Saw pain mx yesterday. Got minimal exercises to help moving.    Today 7/8/2022:    Sister Betty presents for follow up. At last visit,w as found to be anemic which explained her SOB. She had iron deficiency and B12 deficiency. Was found to have GIB.  Had multiple GI visits, EGD, was treated with cauterization x 3  for GAVE. This AM, had brown stool not black.    SOB is improved.    No gout flares since last visit.    Her major complaint is severe R hip pain, not responding to current pain mx, considers hip arthroplasty, but was told to wait till anemia gets fixed, also has to figure out her heart condition.              Review of Systems:   A comprehensive ROS was done. Positives are per HPI.      Active Medications:     Outpatient Medications Prior to Visit   Medication Sig Dispense Refill     ACCU-CHEK SHANNON PLUS test strip USE TO TEST BLOOD SUGARS 3 TIMES DAILY 300 strip 5     acetaminophen (TYLENOL) 500 MG tablet Take 1,000 mg by mouth every 8 hours as needed (max 6 tablets/24 hours, 2 tablets/dose)        acyclovir (ZOVIRAX) 400 MG tablet Take 1 tablet (400 mg) by mouth 3 times daily For a couple days 15 tablet 2     acyclovir (ZOVIRAX) 5 % external ointment Apply topically 6 times daily As needed for outbreaks 15 g 3     albuterol (PROAIR HFA/PROVENTIL HFA/VENTOLIN HFA) 108 (90 Base) MCG/ACT inhaler Inhale 2 puffs into the lungs every 6 hours 18 g 11     alendronate (FOSAMAX) 70 MG tablet Take 1 tablet (70 mg) by mouth every 7 days 12 tablet 0     anakinra (KINERET) 100 MG/0.67ML SOSY injection 100 mg every day x 3 days as needed for flare ups 6.7 mL 3     atorvastatin (LIPITOR) 10 MG tablet TAKE 1/2 TABLET BY MOUTH EVERY DAY 45 tablet 2     azithromycin (ZITHROMAX) 250 MG tablet TAKE 1 TABLET BY MOUTH EVERY DAY 30 tablet 5     BD PEN NEEDLE JANE 2ND GEN 32G X 4 MM miscellaneous USE 4 DAILY AS DIRECTED. 400 each 1     blood glucose (NO BRAND SPECIFIED) lancets standard Use to test blood sugar 3 times daily or as directed 100 lancet 3     cevimeline (EVOXAC) 30 MG capsule Take 1 capsule (30 mg) by mouth 3 times daily (Patient taking differently: Take 30 mg by mouth every other day) 270 capsule 2     Cholecalciferol (VITAMIN D3) 50 MCG (2000 UT) CAPS Take  100 mcg by mouth daily (Take 2 tablet (50 mcg) by mouth daily - Oral) 90 capsule 1     COMPOUNDED NON-CONTROLLED SUBSTANCE (CMPD RX) - PHARMACY TO MIX COMPOUNDED MEDICATION Estriol 1 mg/g Apply small amount to finger and apply to inside vagina daily for 2 weeks then twice weekly Route: vaginally Dispense 30 grams 11 refills (Patient taking differently: Estriol 1 mg/g Apply small amount to finger and apply to inside vagina twice weekly Route: vaginally Dispense 30 grams 11 refills) 30 g 11     cyanocobalamin (VITAMIN B-12) 1000 MCG tablet Take 1 tablet (1,000 mcg) by mouth daily 30 tablet 1     escitalopram (LEXAPRO) 20 MG tablet TAKE 1 TABLET BY MOUTH EVERY DAY 90 tablet 0     ferrous sulfate (FEROSUL) 325 (65 Fe) MG tablet TAKE 1 TABLET BY MOUTH EVERY DAY WITH BREAKFAST 90 tablet 0     fluticasone (FLONASE) 50 MCG/ACT nasal spray SPRAY 1-2 SPRAYS INTO BOTH NOSTRILS DAILY AS NEEDED FOR ALLERGIES 16 mL 11     fluticasone-vilanterol (BREO ELLIPTA) 100-25 MCG/INH inhaler Inhale 1 puff into the lungs daily 1 each 11     HYDROcodone-acetaminophen (NORCO) 7.5-325 MG per tablet Take 1 tablet by mouth every 12 hours OK to fill and start 06/28/22 60 tablet 0     hydroxychloroquine (PLAQUENIL) 200 MG tablet Take 2 tablets (400 mg) by mouth daily Get annual eye exams for hydroxychloroquine (Plaquenil) monitoring and fax to [phone] 180 tablet 3     insulin glargine (BASAGLAR KWIKPEN) 100 UNIT/ML pen INJECT 22 UNITS SUBCUTANEOUS DAILY (TO REPLACE LANTUS) 15 mL 0     ketoconazole (NIZORAL) 2 % external cream Apply topically 2 times daily as needed for itching 60 g 1     KLOR-CON 20 MEQ CR tablet TAKE 2 TABLETS (40 MEQ) BY MOUTH EVERY MORNING AND 1 TABLET (20 MEQ) EVERY EVENING. 270 tablet 3     lidocaine (LIDODERM) 5 % patch APPLY PATCH TO PAINFUL AREA FOR UP TO 12 H WITHIN A 24 H PERIOD. REMOVE AFTER 12 HOURS. 30 patch 2     loratadine (CLARITIN) 10 MG tablet TAKE 1 TABLET BY MOUTH EVERY DAY 90 tablet 3     methocarbamol  (ROBAXIN) 750 MG tablet Take 1 tablet (750 mg) by mouth 3 times daily as needed for muscle spasms 90 tablet 3     metoprolol succinate ER (TOPROL-XL) 50 MG 24 hr tablet Take 1 tablet (50 mg) by mouth 2 times daily 90 tablet 3     montelukast (SINGULAIR) 10 MG tablet TAKE 1 TABLET BY MOUTH EVERYDAY AT BEDTIME 90 tablet 0     NOVOLOG FLEXPEN 100 UNIT/ML soln INJECT 12 UNITS SUBCUTANEOUS 3 TIMES DAILY (WITH MEALS) 15 mL 1     pantoprazole (PROTONIX) 40 MG EC tablet Take 1 tablet (40 mg) by mouth daily (replaces famotidine- should stop taking famotidine) 90 tablet 1     predniSONE (DELTASONE) 5 MG tablet Take 1 tablet (5 mg) by mouth daily 30 tablet 0     rivaroxaban ANTICOAGULANT (XARELTO ANTICOAGULANT) 20 MG TABS tablet TAKE 1 TABLET BY MOUTH DAILY WITH DINNER 90 tablet 3     Semaglutide, 1 MG/DOSE, (OZEMPIC, 1 MG/DOSE,) 4 MG/3ML SOPN Inject 1 mg Subcutaneous once a week (Patient taking differently: Inject 1 mg Subcutaneous once a week Mondays) 9 mL 1     spironolactone (ALDACTONE) 25 MG tablet Take 2 tablets (50 mg) by mouth daily 180 tablet 3     torsemide (DEMADEX) 10 MG tablet TAKE ONE TAB (10 MG) WITH ONE 20 MG TAB TO = 30 MG DAILY IN AFTERNOON. 90 tablet 3     torsemide (DEMADEX) 20 MG tablet TAKE 2 TABLETS (40 MG) BY MOUTH EVERY MORNING AND 1 TABLET (20 MG) DAILY AT 2 PM. TAKE THE AFTERNOON DOSE WITH AN EXTRA 10 MG TAB FOR A TOTAL OF 30 MG IN THE AFTERNOON 270 tablet 3     traZODone (DESYREL) 50 MG tablet Take 3 tablets (150 mg) by mouth nightly as needed for sleep 135 tablet 1     bisacodyl (DULCOLAX) 5 MG EC tablet Take as directed. One day before exam take 2 tablets at 3 PM. Take 2 tablets at 11 PM. (Patient not taking: Reported on 7/8/2022) 4 tablet 0     polyethylene glycol (GOLYTELY) 236 g suspension Take as directed. One day before exam fill the jug with water. Cover and shake until well mixed. At 6 PM start drinking an 8oz glass of mixture every 15 minutes until jug is 1/2 empty. Store remainder in the  refrigerator.  At 11 PM Start drinking the other half of the Golytely jug. Drink one 8-ounce glass every 15 minutes until the jug is empty. (Patient not taking: Reported on 7/8/2022) 4000 mL 0     No facility-administered medications prior to visit.     Allergies:   Augmentin\  Codeine  Phenobarbital  Seasonal allergies      Past Medical History:  Alcohol abuse, in remission.   Allergic rhinitis.   Antiplatelet long-term use.   Atrial fibrillation.   Cardiomegaly.   Osteopenia.   Diverticulosis.   Benign essential hypertension.   GERD.   Insomnia.   Irregular heart beat.   Lumbago.   Major depressive disorder, recurrent episode, moderate.   ANGELICA.  Osteoarthrosis.   Sjogren's syndrome.   Sleep apnea.   Tobacco use disorder.   TMJ disorder.   RLS.  Major depressive disorder.   Hypertension.   Sciatica.   Colouterine fistula.   Left ventricular hypertrophy.   Breat fibroadenoma.   DVT.   Fatty liver disease, nonalcoholic.   Rib pain.   Neck mass.   Interstitial lung disease.   Acute and chronic respiratory failure with hypoxia.   Type II diabetes mellitus.   Hyperlipidemia.   Inflammatory arthritis.      Past Surgical History:  Back surgery.   Left breast biopsy.   Appendectomy.   Exploratory laparotomy.   Cardiac surgery.   Cholecystectomy.   Left colectomy.   Total abdominal hysterectomy with bilateral salpingo-oophorectomy.   Insert ureter stent.   Flexible sigmoidoscopy.   Ileostomy takedown.     Family History:   Mother: Positive for CAD, heart disease, hypertension, cerebrovascular disease, hyperlipidemia.   Father: Positive for alcohol/drug abuse, Alzheimer disease, dementia, hypertension, hyperlipidemia.   Sister: Positive for CAD, hypertension, and colorectal cancer.   Sister: Positive for hypertension and hyperlipidemia.   Sister: Positive for diabetes, lung cancer, asthma, and heart disease.   Brother: Positive for Parkinsonism and substance abuse.   Brother: Positive for hypertension, diverticulitis, and  prostate cancer.   Daughter: Positive for breast cancer.        Social History:   She is a former smoker; quit in 2011. She has a history of alcohol use but stopped drinking in 1986.      Physical Exam:     /72   Pulse 55   Temp 97.7  F (36.5  C)   Resp 18   Wt 85.3 kg (188 lb)   SpO2 98%   BMI 30.36 kg/m      Constitutional: NAD, very pleasant, sister Nghia present   Eyes: Normal EOM, conjunctiva, sclera  Chest: CTAB  CV: no M/R/G, RRR  Abdomen: soft, NT  MSK: no active synovitis or joint tenderness. Painful ROM of R hip  Skin: No rash  Neuro: grossly non-focal  Psych: Normal affect.    Images:  CT Chest w/o contrast (7/25/18):  Findings remain most consistent with small airway disease  and/or nonclassifiable interstitial lung disease and are not  significantly changed from the March 2017 comparison.    Per radiology.    Laboratory:   RHEUM RESULTS Latest Ref Rng & Units 9/24/2004 10/3/2005 10/28/2005   ALBUMIN 3.4 - 5.0 g/dL - 4.0 -   ALT 0 - 50 U/L - 21 -   AST 0 - 45 U/L - 26 -   ANCA - - - -   COMPLEMENT C3 81 - 157 mg/dL - - -   COMPLEMENT C4 13 - 39 mg/dL - - -   CK TOTAL 30 - 225 U/L - - -   CREATININE 0.52 - 1.04 mg/dL 0.80 0.90 0.90   CRP 0.0 - 8.0 mg/L - - -   FRANCISCO <1.0 - - -   GFR ESTIMATE, IF BLACK >60 mL/min/[1.73:m2] >80 >80 >80   GFR ESTIMATE >60 mL/min/1.73m2 77 67 67   HEMATOCRIT 35.0 - 47.0 % 44.8 45.5 46.9   HEMOGLOBIN 11.7 - 15.7 g/dL 15.7 14.8 15.0   IGA 84 - 499 mg/dL - - -   IGM 35 - 242 mg/dL - - -    - 1,616 mg/dL - - -   WBC 4.0 - 11.0 10e3/uL 7.2 8.6 7.4   RBC 3.80 - 5.20 10e6/uL 4.87 4.90 4.99   RDW 10.0 - 15.0 % 13.7 14.4 14.1   MCHC 31.5 - 36.5 g/dL 35.0 32.5 32.0   MCV 78 - 100 fL 92 93 94   PLATELET COUNT 150 - 450 10e3/uL 207 233 236   RHEUMATOID FACTOR <12 IU/mL - - -   ESR 0 - 30 mm/hr - - -       Rheumatoid Factor   Date Value Ref Range Status   07/24/2015 <20 <20 IU/mL Final   ,  ,   Cyclic Cit Pept IgG/IgA   Date Value Ref Range Status   07/24/2015  <20  Interpretation:  Negative   <20 UNITS Final   ,  ,   Scleroderma Antibody Scl-70 CECILIA IgG   Date Value Ref Range Status   07/24/2015  0.0 - 0.9 AI Final    <0.2  Negative   Antibody index (AI) values reflect qualitative changes in antibody   concentration that cannot be directly associated with clinical condition or   disease state.       SSA (Ro) (CECILIA) Antibody, IgG   Date Value Ref Range Status   07/24/2015 1.6 (H) 0.0 - 0.9 AI Final     Comment:     Positive   Antibody index (AI) values reflect qualitative changes in antibody   concentration that cannot be directly associated with clinical condition or   disease state.       SSB (La) (CECILIA) Antibody, IgG   Date Value Ref Range Status   07/24/2015  0.0 - 0.9 AI Final    <0.2  Negative   Antibody index (AI) values reflect qualitative changes in antibody   concentration that cannot be directly associated with clinical condition or   disease state.       ,  ,   RG Screen by EIA   Date Value Ref Range Status   07/24/2015 <1.0  Interpretation:  Negative   <1.0 Final   ,  ,  ,  ,  ,  ,  ,  ,  ,  ,  ,  ,  ,  ,  ,  ,  ,  ,   Neutrophil Cytoplasmic IgG Antibody   Date Value Ref Range Status   07/24/2015   Final    <1:20  Reference range: <1:20  (Note)  The ANCA IFA is <1:20; therefore, no further testing will  be performed.  INTERPRETIVE INFORMATION: Anti-Neutrophil Cyto Ab, IgG  Neutrophil Cytoplasmic Antibodies (C-ANCA = granular  cytoplasmic staining, P-ANCA = perinuclear staining) are  found in the serum of over 90 percent of patients with  certain necrotizing systemic vasculitides, and usually in  less than 5 percent of patients with collagen vascular  disease or arthritis.  Performed by [facility],  [address] [place] [id number] [phone]  [email or web address], Burke Mckeon MD, Lab. Director       ,  ,  ,  ,  ,  ,  ,   IGG   Date Value Ref Range Status   07/24/2015 1320 695 - 1620 mg/dL Final   ,  ,  ,  ,  ,   Scleroderma Antibody Scl-70 CECILIA IgG   Date  Value Ref Range Status   07/24/2015  0.0 - 0.9 AI Final    <0.2  Negative   Antibody index (AI) values reflect qualitative changes in antibody   concentration that cannot be directly associated with clinical condition or   disease state.          CBC RESULTS: 6/4/2021   Lab Test 06/04/21  1422   WBC 8.6   RBC 4.46   HGB 13.4   HCT 42.1   MCV 94   MCH 30.0   MCHC 31.8   RDW 15.5*        Last Comprehensive Metabolic Panel:  Sodium   Date Value Ref Range Status   05/25/2022 141 133 - 144 mmol/L Final   06/04/2021 137 133 - 144 mmol/L Final     Potassium   Date Value Ref Range Status   05/25/2022 4.2 3.4 - 5.3 mmol/L Final   06/04/2021 4.0 3.4 - 5.3 mmol/L Final     Chloride   Date Value Ref Range Status   05/25/2022 108 94 - 109 mmol/L Final   06/04/2021 106 94 - 109 mmol/L Final     Carbon Dioxide   Date Value Ref Range Status   06/04/2021 25 20 - 32 mmol/L Final     Carbon Dioxide (CO2)   Date Value Ref Range Status   05/25/2022 24 20 - 32 mmol/L Final     Anion Gap   Date Value Ref Range Status   05/25/2022 9 3 - 14 mmol/L Final   06/04/2021 6 3 - 14 mmol/L Final     Glucose   Date Value Ref Range Status   05/25/2022 124 (H) 70 - 99 mg/dL Final   06/04/2021 142 (H) 70 - 99 mg/dL Final     GLUCOSE BY METER POCT   Date Value Ref Range Status   05/31/2022 163 (H) 70 - 99 mg/dL Final     Urea Nitrogen   Date Value Ref Range Status   05/25/2022 21 7 - 30 mg/dL Final   06/04/2021 14 7 - 30 mg/dL Final     Creatinine   Date Value Ref Range Status   05/25/2022 1.06 (H) 0.52 - 1.04 mg/dL Final   06/04/2021 1.11 (H) 0.52 - 1.04 mg/dL Final     GFR Estimate   Date Value Ref Range Status   05/25/2022 52 (L) >60 mL/min/1.73m2 Final     Comment:     Effective December 21, 2021 eGFRcr in adults is calculated using the 2021 CKD-EPI creatinine equation which includes age and gender (Angelita et al., NEJM, DOI: 10.1056/RNBAht7210047)   06/04/2021 47 (L) >60 mL/min/[1.73_m2] Final     Comment:     Non  GFR  Calc  Starting 12/18/2018, serum creatinine based estimated GFR (eGFR) will be   calculated using the Chronic Kidney Disease Epidemiology Collaboration   (CKD-EPI) equation.       Calcium   Date Value Ref Range Status   05/25/2022 8.9 8.5 - 10.1 mg/dL Final   06/04/2021 8.8 8.5 - 10.1 mg/dL Final       ESR 6/4/2021: 10.0    CRP 6/4/2021: 3.0    Uric acid 6/4/2021: 6.2    Component      Latest Ref Rng & Units 2/11/2022   WBC      4.0 - 11.0 10e3/uL 11.5 (H)   RBC Count      3.80 - 5.20 10e6/uL 3.47 (L)   Hemoglobin      11.7 - 15.7 g/dL 9.1 (L)   Hematocrit      35.0 - 47.0 % 30.2 (L)   MCV      78 - 100 fL 87   MCH      26.5 - 33.0 pg 26.2 (L)   MCHC      31.5 - 36.5 g/dL 30.1 (L)   RDW      10.0 - 15.0 % 15.1 (H)   Platelet Count      150 - 450 10e3/uL 283   % Neutrophils      % 76   % Lymphocytes      % 9   % Monocytes      % 11   % Eosinophils      % 2   % Basophils      % 1   % Immature Granulocytes      % 1   NRBCs per 100 WBC      <1 /100 0   Absolute Neutrophils      1.6 - 8.3 10e3/uL 8.9 (H)   Absolute Lymphocytes      0.8 - 5.3 10e3/uL 1.0   Absolute Monocytes      0.0 - 1.3 10e3/uL 1.2   Absolute Eosinophils      0.0 - 0.7 10e3/uL 0.2   Absolute Basophils      0.0 - 0.2 10e3/uL 0.1   Absolute Immature Granulocytes      <=0.4 10e3/uL 0.1   Absolute NRBCs      10e3/uL 0.0   Albumin Fraction      3.7 - 5.1 g/dL 3.9   Alpha 1 Fraction      0.2 - 0.4 g/dL 0.4   Alpha 2 Fraction      0.5 - 0.9 g/dL 0.8   Beta Fraction      0.6 - 1.0 g/dL 1.0   Gamma Fraction      0.7 - 1.6 g/dL 0.8   Monoclonal Peak      <=0.0 g/dL 0.0   ELP Interpretation:       Essentially normal electrophoretic pattern. No obvious monoclonal proteins seen. Pathologic significance requires clinical correlation. Meghan Brothers M.D., Ph.D.   Iron      35 - 180 ug/dL 17 (L)   Iron Binding Cap      240 - 430 ug/dL 354   Iron Saturation Index      15 - 46 % 5 (L)   Creatinine      0.52 - 1.04 mg/dL 1.00   GFR Estimate      >60 mL/min/1.73m2 56 (L)   %  Retic      0.5 - 2.0 % 2.7 (H)   Absolute Retic      0.025 - 0.095 10e6/uL 0.090   ALT      0 - 50 U/L 17   Albumin      3.4 - 5.0 g/dL 3.3 (L)   AST      0 - 45 U/L 15   Sed Rate      0 - 30 mm/hr 36 (H)   CRP Inflammation      0.0 - 8.0 mg/L 12.5 (H)   Uric Acid      2.6 - 6.0 mg/dL 6.1 (H)   Immunofixation ELP       No monoclonal protein seen on immunofixation. Pathologic significance requires clinical correlation.  Meghan Brothers M.D., Ph.D.   Immunofix ELP Urine       No monoclonal protein seen on immunofixation. Pathologic significance requires clinical correlation.  Meghan Brothers M.D., Ph.D.   Total Protein Serum for ELP      6.8 - 8.8 g/dL 6.9   N-Terminal Pro Bnp      0 - 450 pg/mL 239   Haptoglobin      32 - 197 mg/dL 149   Vitamin B12      193 - 986 pg/mL 162 (L)   Ferritin      8 - 252 ng/mL 14

## 2022-07-08 NOTE — TELEPHONE ENCOUNTER
Patient called.  States she needs Lidocaine patches today.    Prescription approved per [facility] Refill Protocol.  Sophia Hemphill RN

## 2022-07-08 NOTE — NURSING NOTE
Chief Complaint   Patient presents with     RECHECK     Follow Up     /72   Pulse 55   Temp 97.7  F (36.5  C)   Resp 18   Wt 85.3 kg (188 lb)   SpO2 98%   BMI 30.36 kg/m    Rhett Cueva on 7/8/2022 at 2:35 PM

## 2022-07-10 LAB — RNAP III IGG SERPL IA-ACNC: 3 UNITS

## 2022-07-11 LAB
ALBUMIN SERPL ELPH-MCNC: 3.8 G/DL (ref 3.7–5.1)
ALPHA1 GLOB SERPL ELPH-MCNC: 0.3 G/DL (ref 0.2–0.4)
ALPHA2 GLOB SERPL ELPH-MCNC: 0.8 G/DL (ref 0.5–0.9)
B-GLOBULIN SERPL ELPH-MCNC: 1 G/DL (ref 0.6–1)
C3 SERPL-MCNC: 123 MG/DL (ref 81–157)
C4 SERPL-MCNC: 29 MG/DL (ref 13–39)
DSDNA AB SER-ACNC: 1 IU/ML
GAMMA GLOB SERPL ELPH-MCNC: 1 G/DL (ref 0.7–1.6)
M PROTEIN SERPL ELPH-MCNC: 0 G/DL
PROT PATTERN SERPL ELPH-IMP: NORMAL

## 2022-07-12 LAB
CENTROMERE B AB SER-ACNC: <0.7 U/ML
CENTROMERE B AB SER-ACNC: NEGATIVE
ENA SCL70 IGG SER IA-ACNC: <0.6 U/ML
ENA SCL70 IGG SER IA-ACNC: NEGATIVE

## 2022-07-13 ENCOUNTER — OFFICE VISIT (OUTPATIENT)
Dept: FAMILY MEDICINE | Facility: CLINIC | Age: 82
End: 2022-07-13
Payer: MEDICARE

## 2022-07-13 ENCOUNTER — TELEPHONE (OUTPATIENT)
Dept: GASTROENTEROLOGY | Facility: CLINIC | Age: 82
End: 2022-07-13

## 2022-07-13 VITALS
OXYGEN SATURATION: 98 % | SYSTOLIC BLOOD PRESSURE: 130 MMHG | WEIGHT: 189.3 LBS | RESPIRATION RATE: 20 BRPM | HEIGHT: 65 IN | HEART RATE: 56 BPM | DIASTOLIC BLOOD PRESSURE: 60 MMHG | TEMPERATURE: 97.7 F | BODY MASS INDEX: 31.54 KG/M2

## 2022-07-13 DIAGNOSIS — E11.65 TYPE 2 DIABETES MELLITUS WITH HYPERGLYCEMIA, WITH LONG-TERM CURRENT USE OF INSULIN (H): ICD-10-CM

## 2022-07-13 DIAGNOSIS — K29.41 CHRONIC ATROPHIC GASTRITIS WITH BLEEDING: ICD-10-CM

## 2022-07-13 DIAGNOSIS — K31.819 GAVE (GASTRIC ANTRAL VASCULAR ECTASIA): ICD-10-CM

## 2022-07-13 DIAGNOSIS — Z79.4 TYPE 2 DIABETES MELLITUS WITH HYPERGLYCEMIA, WITH LONG-TERM CURRENT USE OF INSULIN (H): ICD-10-CM

## 2022-07-13 DIAGNOSIS — D50.9 IRON DEFICIENCY ANEMIA, UNSPECIFIED IRON DEFICIENCY ANEMIA TYPE: ICD-10-CM

## 2022-07-13 DIAGNOSIS — I50.32 CHRONIC HEART FAILURE WITH PRESERVED EJECTION FRACTION (H): ICD-10-CM

## 2022-07-13 DIAGNOSIS — M35.02 SJOGREN'S SYNDROME WITH LUNG INVOLVEMENT (H): ICD-10-CM

## 2022-07-13 DIAGNOSIS — Z01.818 PREOP GENERAL PHYSICAL EXAM: Primary | ICD-10-CM

## 2022-07-13 PROCEDURE — 99215 OFFICE O/P EST HI 40 MIN: CPT | Performed by: FAMILY MEDICINE

## 2022-07-13 ASSESSMENT — PAIN SCALES - GENERAL: PAINLEVEL: SEVERE PAIN (6)

## 2022-07-13 NOTE — TELEPHONE ENCOUNTER
Patient scheduled for EGD on 7.19.22.     Covid test scheduled ?. Discuss at home test option.     Pre op exam scheduled: 7.13.22 Dr. William Amaral    Arrival time: 0830    Facility location: [facility]    Sedation type: MAC    Indication for procedure: gastric antral vascular ectasia    Anticoagulations? No      Prep instructions sent via [employer]    Pre visit planning completed.    Nichole Oconnor RN

## 2022-07-13 NOTE — PATIENT INSTRUCTIONS
Preparing for Your Surgery  Getting started  A nurse will call you to review your health history and instructions. They will give you an arrival time based on your scheduled surgery time. Please be ready to share:    Your doctor's clinic name and phone number    Your medical, surgical and anesthesia history    A list of allergies and sensitivities    A list of medicines, including herbal treatments and over-the-counter drugs    Whether the patient has a legal guardian (ask how to send us the papers in advance)  Please tell us if you're pregnant--or if there's any chance you might be pregnant. Some surgeries may injure a fetus (unborn baby), so they require a pregnancy test. Surgeries that are safe for a fetus don't always need a test, and you can choose whether to have one.   If you have a child who's having surgery, please ask for a copy of Preparing for Your Child's Surgery.    Preparing for surgery    Within 30 days of surgery: Have a pre-op exam (sometimes called an H&P, or History and Physical). This can be done at a clinic or pre-operative center.  ? If you're having a , you may not need this exam. Talk to your care team.    At your pre-op exam, talk to your care team about all medicines you take. If you need to stop any medicines before surgery, ask when to start taking them again.  ? We do this for your safety. Many medicines can make you bleed too much during surgery. Some change how well surgery (anesthesia) drugs work.    Call your insurance company to let them know you're having surgery. (If you don't have insurance, call [phone].)    Call your clinic if there's any change in your health. This includes signs of a cold or flu (sore throat, runny nose, cough, rash, fever). It also includes a scrape or scratch near the surgery site.    If you have questions on the day of surgery, call your hospital or surgery center.  COVID testing  You may need to be tested for COVID-19 before having  surgery. If so, we will give you instructions.  Eating and drinking guidelines  For your safety: Unless your surgeon tells you otherwise, follow the guidelines below.    Eat and drink as usual until 8 hours before surgery. After that, no food or milk.    Drink clear liquids until 2 hours before surgery. These are liquids you can see through, like water, Gatorade and Propel Water. You may also have black coffee and tea (no cream or milk).    Nothing by mouth within 2 hours of surgery. This includes gum, candy and breath mints.    If you drink alcohol: Stop drinking it the night before surgery.    If your care team tells you to take medicine on the morning of surgery, it's okay to take it with a sip of water.  Preventing infection    Shower or bathe the night before and morning of your surgery. Follow the instructions your clinic gave you. (If no instructions, use regular soap.)    Don't shave or clip hair near your surgery site. We'll remove the hair if needed.    Don't smoke or vape the morning of surgery. You may chew nicotine gum up to 2 hours before surgery. A nicotine patch is okay.  ? Note: Some surgeries require you to completely quit smoking and nicotine. Check with your surgeon.    Your care team will make every effort to keep you safe from infection. We will:  ? Clean our hands often with soap and water (or an alcohol-based hand rub).  ? Clean the skin at your surgery site with a special soap that kills germs.  ? Give you a special gown to keep you warm. (Cold raises the risk of infection.)  ? Wear special hair covers, masks, gowns and gloves during surgery.  ? Give antibiotic medicine, if prescribed. Not all surgeries need antibiotics.  What to bring on the day of surgery    Photo ID and insurance card    Copy of your health care directive, if you have one    Glasses and hearing aides (bring cases)  ? You can't wear contacts during surgery    Inhaler and eye drops, if you use them (tell us about these when  you arrive)    CPAP machine or breathing device, if you use them    A few personal items, if spending the night    If you have . . .  ? A pacemaker, ICD (cardiac defibrillator) or other implant: Bring the ID card.  ? An implanted stimulator: Bring the remote control.  ? A legal guardian: Bring a copy of the certified (court-stamped) guardianship papers.  Please remove any jewelry, including body piercings. Leave jewelry and other valuables at home.  If you're going home the day of surgery    You must have a responsible adult drive you home. They should stay with you overnight as well.    If you don't have someone to stay with you, and you aren't safe to go home alone, we may keep you overnight. Insurance often won't pay for this.  After surgery  If it's hard to control your pain or you need more pain medicine, please call your surgeon's office.  Questions?   If you have any questions for your care team, list them here: _________________________________________________________________________________________________________________________________________________________________________ ____________________________________ ____________________________________ ____________________________________  For informational purposes only. Not to replace the advice of your health care provider. Copyright   2003, 2019 [facility]. All rights reserved. Clinically reviewed by Joanne Weaver MD. [employer] [id number] - REV 07/21.

## 2022-07-13 NOTE — TELEPHONE ENCOUNTER
Pre assessment questions completed for upcoming EGD procedure scheduled on 7.19.22    Discussed at home rapid antigen COVID test 1-2 days prior to procedure. Patient states will choose this option. Pre op this afternoon with Dr. William Amaral.    Reviewed procedural arrival time 0830 and facility location [facility].    Designated  policy reviewed. Instructed to have someone stay 24 hours post procedure.     Anticoagulation/blood thinners? Patient states taking Xarelto prescribed by Dr. Rosa. Will send message regarding standard 2 day hold.    Electronic implanted devices? No    Reviewed EGD prep instructions with patient.     Patient verbalized understanding and had no questions or concerns at this time.    Nichole Oconnor RN

## 2022-07-13 NOTE — NURSING NOTE
"Chief Complaint   Patient presents with     Pre-Op Exam     initial /60   Pulse 56   Temp 97.7  F (36.5  C)   Resp 20   Ht 1.651 m (5' 5\")   Wt 85.9 kg (189 lb 4.8 oz)   SpO2 98%   BMI 31.50 kg/m   Estimated body mass index is 31.5 kg/m  as calculated from the following:    Height as of this encounter: 1.651 m (5' 5\").    Weight as of this encounter: 85.9 kg (189 lb 4.8 oz).  BP completed using cuff size: regular.  L arm      Health Maintenance that is potentially due pending provider review:  physical      Elise Dsouza RN    "

## 2022-07-13 NOTE — PROGRESS NOTES
[facility]  [address], [address]  [facility] [address]  Phone: [phone]  Primary Provider: Ilene Tristan  Pre-op Performing Provider: EULOGIO YOU      PREOPERATIVE EVALUATION:  Today's date: 7/13/2022    Betty Tee is a 82 year old female who presents for a preoperative evaluation.    Surgical Information:  Surgery/Procedure: Esophagogastroduodenoscopy  Surgery Location: [facility]  Surgeon: Guru Winsome Dias MD  Surgery Date: 7/19/22  Time of Surgery: 8:30AM  Where patient plans to recover: At home with family  Fax number for surgical facility: Note does not need to be faxed, will be available electronically in Epic.    Type of Anesthesia Anticipated: Conscious sedation    Assessment & Plan     The proposed surgical procedure is considered LOW risk.    Preop general physical exam  Cleared to proceed with the upper endoscopy and radiofrequency ablation if needed procedure as scheduled.  She is going to hold all of her medications with the exception of the Basaglar insulin which she is going to take 80% of the regular dose (19 units) on the morning of surgery.  She is going to hold Xarelto for 2 days prior to this procedure.    GAVE (gastric antral vascular ectasia)  She will continue following closely with gastroenterology.    Chronic atrophic gastritis with bleeding     Iron deficiency anemia, unspecified iron deficiency anemia type  Continue the iron supplement.    Chronic heart failure with preserved ejection fraction (H)  She will continue her current medications and keep following closely with cardiology.    Type 2 diabetes mellitus with hyperglycemia, with long-term current use of insulin (H)  She is going to take 19 units of the Basaglar insulin on the morning of surgery and she will hold the short acting NovoLog on the morning of surgery.    Sjogren's syndrome with lung  involvement (H)  She will continue her current medications and keep following closely with rheumatology.                   RECOMMENDATION:  APPROVAL GIVEN to proceed with proposed procedure, without further diagnostic evaluation.    40 minutes spent on the date of the encounter doing chart review, review of test results, patient visit and documentation         Subjective     HPI related to upcoming procedure: She has a history significant for gastric antral vascular ectasia with bleeding, Sjogren's follicular bronchiolitis, chronic steroid therapy, HFpEF NYHA III, atrial fibrillation ([id number]), hypertension, obstructive sleep apnea, obesity, CKD stage III, IDDM, hx colo-uterine fistula s/p sigmoidectomy, TMJ arthralgia, osteoporosis, gout, severe right hip OA, iron deficiency anemia and chronic immunosuppression.  She has undergone a couple of her recent upper endoscopy procedures that showed gastric antral vascular ectasia with bleeding that was treated with radiofrequency ablation.  She is currently on Xarelto which is likely contributing to this.  She follows closely with cardiology and they are considering a watchman device.  She currently reports that stools are black.  She attributes this to the iron supplement.      Preop Questions 7/13/2022   1. Have you ever had a heart attack or stroke? No   2. Have you ever had surgery on your heart or blood vessels, such as a stent placement, a coronary artery bypass, or surgery on an artery in your head, neck, heart, or legs? No   3. Do you have chest pain with activity? No   4. Do you have a history of  heart failure? YES   5. Do you currently have a cold, bronchitis or symptoms of other infection? No   6. Do you have a cough, shortness of breath, or wheezing? No   7. Do you or anyone in your family have previous history of blood clots? No   8. Do you or does anyone in your family have a serious bleeding problem such as prolonged bleeding following surgeries or  cuts? UNKNOWN    9. Have you ever had problems with anemia or been told to take iron pills? YES    10. Have you had any abnormal blood loss such as black, tarry or bloody stools, or abnormal vaginal bleeding? YES    11. Have you ever had a blood transfusion? No   12. Are you willing to have a blood transfusion if it is medically needed before, during, or after your surgery? Yes   13. Have you or any of your relatives ever had problems with anesthesia? UNKNOWN    14. Do you have sleep apnea, excessive snoring or daytime drowsiness? YES    14a. Do you have a CPAP machine? Yes   15. Do you have any artifical heart valves or other implanted medical devices like a pacemaker, defibrillator, or continuous glucose monitor? No   16. Do you have artificial joints? No   17. Are you allergic to latex? No       Preoperative Review of :   reviewed - controlled substances reflected in medication list.          Review of Systems  CONSTITUTIONAL: NEGATIVE for fever, chills, change in weight  INTEGUMENTARY/SKIN: NEGATIVE for worrisome rashes, moles or lesions  EYES: NEGATIVE for vision changes or irritation  ENT/MOUTH: NEGATIVE for ear, mouth and throat problems  RESP: NEGATIVE for significant cough or SOB  CV: NEGATIVE for chest pain, palpitations or peripheral edema  GI: NEGATIVE for nausea, abdominal pain, heartburn, or change in bowel habits  : NEGATIVE for frequency, dysuria, or hematuria  MUSCULOSKELETAL: NEGATIVE for significant arthralgias or myalgia  NEURO: NEGATIVE for weakness, dizziness or paresthesias  ENDOCRINE: NEGATIVE for temperature intolerance, skin/hair changes  HEME: NEGATIVE for bleeding problems  PSYCHIATRIC: NEGATIVE for changes in mood or affect    Patient Active Problem List    Diagnosis Date Noted     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 Rosa.  On Eliquis.  Dx in 10/11.  A.fib first dx in  10/11 during inpt stay for diverticulitis which ultimately required partial colectomy.  Initially anticoagulated with warfarin.  Switched to xarelto in early '18.  Following with Dr. Rosa.         Diverticulitis of colon 09/24/2004     Priority: High     Multiple complications, surgeries, and hospitalizations from ~9/11-12/11 (see 12/09/11 note for overview).  A.fib started during with fluid overload during one of these hospitalizations.  Colostomy takedown in 2/12.  F/u colonoscopy in 12/12 (Dr. Canseco) looked good- rec next f/u colonoscopy in 10 yrs.           Elevated parathyroid hormone 04/21/2022     Priority: Medium     4/22 PTH intact elevated- will add on Vit D, but not able to add on calcium to the lab- discuss at 5/22 appt with pt.       Seasonal allergic rhinitis due to pollen 03/23/2022     Priority: Medium     Claritin, flonase, singulair       Hip pain, right 02/11/2022     Priority: Medium     Osteoarthritis of right hip 01/05/2021     Priority: Medium     Urge incontinence of urine 10/02/2019     Priority: Medium     10/19 consultation with Dr. Nassar- improved sx's with adjusting diuretic dosing and more frequent bathroom breaks.  Torsemide dosing switched to 40mg in am and 30mg in pm (from other way around).  Topical estradiol trial in late '21.       Stage 3b chronic kidney disease (H) 09/03/2019     Priority: Medium     GFR 40-50s since ~4/19.    GFR had been good (in 60 to >90s), but with dips to 50s for many yrs from 11/11 and 8/16- dips very sporadic for awhile.  GFR then lower mostly in 50-60s ~'17-19.  Since ~4/19, more in 40-50s, better in 60s in early '20, but then back down in 40-50s.       Follicular bronchiolitis (H)      Priority: Medium     Follicular bronchiolitis w/ Sjogrens.  Following with pulmonary, Dr. Tillman.  10/21, rec 6mo f/u with PFTs.  Rec daily azithromycin, and BREO (from flovent).  Dx associated with Sjogrens, dx by chest CT showing mosaic attenuation and air  trapping       Inflammatory arthritis 11/09/2017     Priority: Medium     Inflam jt issue- shoulder, hp, generalized jt pains resolved with prednisone- dx with Sjogrens - initial dx pallendromic rheumatism vs pseudogout per rheum- stabilized on prednisone.       Long term current use of anticoagulant therapy 10/05/2017     Priority: Medium     Xarelto (switched to this from coumadin in early 2018 via cardiology).  **ensure dosing separate from atorvastatin       Hyperlipidemia LDL goal <100 05/22/2017     Priority: Medium     Lipitor 5 mg/d (lowered from 10mg/d in 2/20 due to LDL of '1').    Lipitor 40mg/d started in 4/17 (with DM dx, but LDL too low at 39 in 6/17, so lowered dose to 20mg/d in 8/17).  2/18 results- LDL down at '5'- will discuss lowering lipitor dose again at next visit.  Lipitor lowered to 10mg/d in 3/18.  F/u LDL in 5/18 at '21', will continue at 10mg/day unless LDL goes lower again.       Heart failure, chronic, with acute decompensation (H) 04/08/2017     Priority: Medium     Type 2 diabetes mellitus with hyperglycemia, with long-term current use of insulin (H) 10/24/2016     Priority: Medium     Novolog, basaglar (dose lowered from 32 to 28 in 3/22), ozempic.    Dx in 11/16 with A1C of 7.0, increased to 11 suddenly.  [id number]- A1Cs in 6-7s.       (HFpEF) heart failure with preserved ejection fraction (H) 08/27/2016     Priority: Medium     Following with Dr. Rosa.  Yearly visit with echo with Dr. Rosa.  6 month interval visits with [facility].             Lower GI bleed 06/26/2016     Priority: Medium     Hospitalized 6/26-28/16 with [facility].  Colonoscopy with Dr. Siddiqi intended for after hospital stay, but pt had multiple complications including readmission on 7/2/16 for respiratory failure due to fluid overload and uncontrolled a.fib.  12/16 consult with Dr. Siddiqi- ludwin of pt's ability to safely tolerate a colonoscopy or colon surgery.  Rec considering cologuard test to stratify her  risk- will discuss with pt at upcoming visit/s.  Discussed- pt is not interested in any testing- would not act on the results if cancer.       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  Followed by [facility] and rheumatology.  Worsening PFT's in 10/15 despite increase in prednisone from 5mg/d to 10mg/d.    ILD conference- thought Follicular bronchiolitis -started on flovent, azithromycin and montelukast in 4/16.  4/17- off home O2 (had been on for activity) since 4/17 hospitalization txt of pneumonia/influenza/acute respiratory failure.       ANGELICA (obstructive sleep apnea) 04/12/2016     Priority: Medium     Dr. Rolle following.  BiLevel (levels lowered in 2/21)    Sleep study in '16, mild sleep apnea, but significant sleep hypoventilation.  Started on CPAP, not helpful, so put on bilevel PAP through Dr. Chandler.       Sjogren's syndrome with lung involvement (H) 10/30/2015     Priority: Medium     Dx in '15 by rheum- likely primary Sjogrens syndrome- 'with borderline positive lip bx, low titer RG, low titer SSA ab, sicca symptoms, interstitial lung disease w/ reticular opacities in lungs- paratracheal lymphadenopathy, elevated CRP'.    Rheum rec txt per pulmonary, rec f/u with rheum in 4/16.   Sx's likely since '12 with migrating joint pains, seen by rheum in '12 (Dr. Ray), dx with inflammatory jt disease, on 0-5mg of prednisone since then with minimal f/u.  Worsening SOB since early '15, lung lesions, PFT's with 50% decreased function- referred to pulmonary.  Has been on more stable prednisone 5mg/d for few months prior to 10/15 f/u PFT's stable, which remained stable.  Will cont f/u through pulmonary and rheum.           Enlarged lymph node 08/04/2015     Priority: Medium     Gastroesophageal reflux disease without esophagitis 07/23/2015     Priority: Medium     Pantoprazole 40mg/d     Pantoprazole restarted in  "3/22 after mistakenly placed on pepcid instead for about the past year- hgb drop noted in ~2/22.    PPI started in 11/11 (when hospitalized for colonic abscess).  Increased to BID in 1/8/16- saw pulmonologist, noted at visit: \"GERD: continues to have heart burn, advised patient to increase PPI from once per day to BID\"       Neck mass 04/28/2015     Priority: Medium     4/7/15 CT scan- prominent lymph nodes- rec f/u clinically, or f/u CT scan in 3-6 months.  Also noted apical lung changes- air trapping vs interstitial pulmonary edema (referred to pulmonary- dx with Sjogren's after seen by rheumatology).  6/15 CT scan- stable neck node, indeterminant- no f/u rec.  Possible related to the Sjogren's.    8/15- FNA of neck mass by ENT, Dr. Polk, no signs of malignancy/lymphoma.  F/u with 9/15 with Dr. Polk - focus on lip/cheek bx's.         Rib pain 10/31/2014     Priority: Medium     S/p severe fall down stairs in 8/14- hospitalized for a few days, then in rehab for a couple weeks.  Significant swelling, no fx's.  INR dropped for a bit between hosp/rehab, and pt developed DVT in 9/14 in R leg.       Fatty liver disease, nonalcoholic 09/24/2014     Priority: Medium     History of deep vein thrombosis (DVT) of lower extremity 09/15/2014     Priority: Medium     Dx on 9/11/14 via u/s- worsening sx's over prior few days.  10/14 hematology consult- felt that the DVT was not a coumadin failure- attributed to immobility s/p fall, lower INR during hospitalization/rehab stay.    Rec continued long-term coumadin therapy (due to a.fib), f/u LE u/s in 3-6- months (nl f/u u/s in 4/15), and compression stockings x 2 yrs (pt compliant).  2/19- coumadin switched to xarelto via cardiology       Breast fibroadenoma 08/18/2014     Priority: Medium     1/14- rec f/u mammogram in a year.       Closed head injury 08/12/2014     Priority: Medium     Allergic reaction caused by a drug - likely plaquenil 04/30/2013     Priority: Medium     " Pt seen at [facility] 5/13/13- plan to do prednisone 7.5mg daily until able to wean down (Dr. Kevin Marie).  Blood tests q3 months.       Joint pains 01/08/2013     Priority: Medium     Inflam jt issue- shoulder, hp, generalized jt pains resolved with prednisone- dx with Sjogrens - initial dx pallendromic rheumatism vs pseudogout per rheum- stabilized on prednisone.       Insomnia 05/26/2012     Priority: Medium     Left ventricular hypertrophy 11/04/2011     Priority: Medium     LVH on stress echo- cardiac w/u at [facility] ER- neg CT scan for PE, neg stress echo in 8/06        Essential hypertension with goal blood pressure less than 140/90 10/19/2010     Priority: Medium     Torsemide, spironolactone, toprol XL (also on potassium since 2017 due to low potassium levels).    Diltiazem stopped in 12/15 due to dizziness (discussed with Dr. Hurtado- switched to toprol XL).  Lisinopril 2.5mg/d stopped in 2/19 per Dr. Rosa, cardiology- due to low BPs..       Major depressive disorder, recurrent episode, moderate (H)      Priority: Medium     Lexapro 20mg/d.  **consider periodic EKG for QT prolongation to assess safety of med/dose, per [employer] MTM review.    10/17- went from paxil 10mg/d to lexapro 10mg/d.  Pt had been on paxil 10mg/d for years- increased to 20mg/d in 8/14.    Trial off in 10/15 (concerned with wt gain).  Also tried off in 5/09, restarted 7/09.    Weaned off clonazepam.           Restless legs syndrome (RLS) 09/06/2007     Priority: Medium     Health Care Home 02/09/2012     Priority: Low              Advanced directives, counseling/discussion 11/01/2011     Priority: Low     Received outside advance directive.  HCD:Previously signed by patient and notarized by [facility].  scanned into EMR as Advance Directive/Living Will document. View document and details in Code Status History Report. Please see advance directive for specifics.   Health care directive (FIVE WISHES) reviewed and  documented.  5/10/12 MALIKA Aguilar LPN        Received outside DNR form.    scanned and placed behind media tab.  Please see DNR form for specifics. Routed to certified facilitator for review and further documentation.  DNR form reviewed and documented.  11/1/11 MALIKA Aguilar LPN  In media tab under date 10/14/11.  Lev Tristan MD           Disorder of bone and cartilage 09/24/2004     Priority: Low     Osteopenia in '10, recheck '15.  Risk factors of PPI use and prednisone use since '12 (0-5mg/d).  Should check Vit D levels as well.       Alcohol abuse, in remission 09/24/2004     Priority: Low     Temporomandibular joint disorder 11/24/2003     Priority: Low     Problem list name updated by automated process. Provider to review        Past Medical History:   Diagnosis Date     Alcohol abuse, in remission      Allergic rhinitis, cause unspecified     allegra helps when she takes it     Antiplatelet or antithrombotic long-term use      Atrial fibrillation (H)     in hosp in 11/11 after surgery w/ fluid overload     Cardiomegaly     LVH on stress echo- cardiac w/u at [facility] ER- neg CT scan for PE, neg stress echo in 8/06     Chest pain, unspecified      Congestive heart failure (H)      Depressive disorder      Diabetes (H)      Disorder of bone and cartilage, unspecified     osteopenia (had been on prempro), improved on 6/06 dexa, stable dexa 11/10     Diverticulosis of colon (without mention of hemorrhage)     last episode yrs ago     Essential hypertension, benign      Follicular bronchiolitis (H)     associated with Sjogrens, dx by chest CT showing mosaic attenuation and air trapping     Gastro-oesophageal reflux disease      ILD (interstitial lung disease) (H)     associated with Sjogrens, also has mildly elevated IgG4, first noted on chest CT 2015 (mild changes) and also has small airways disease; ILD improved on follow up chest CT 2018.     Insomnia, unspecified     weaned off clonazepam      Irregular heart beat      Lumbago 7/09    MRI with [facility]AL, now seeing Dr. Cain for sciatic sx's     Major depressive disorder, recurrent episode, moderate (H)      Obstructive sleep apnea     uses cpap     Osteoarthrosis, unspecified whether generalized or localized, unspecified site      Sjogren's syndrome (H)     + RG and SSA and lip bx     Sleep apnea      Tobacco use disorder     chantix in 9/07, started again in 6/08, working     Past Surgical History:   Procedure Laterality Date     APPENDECTOMY       BACK SURGERY  1962     BACK SURGERY  1962     BIOPSY BREAST  09/27/2002    Biopsy Left Breast     BIOPSY BREAST Left 09/27/2002     BREAST SURGERY       CARDIAC SURGERY       CARDIAC SURGERY       CHOLECYSTECTOMY  1990's?     CHOLECYSTECTOMY       COLECTOMY LEFT  11/07/2011    Procedure:COLECTOMY LEFT; Laparoscopic mobilization of splenic flexture, sigmoid colectomy, coloprotoscopy, loop illeostomy; Surgeon:CK SIDDIQI; Location:[facility] OR     COLECTOMY LEFT  11/07/2011     COLONOSCOPY  1990,s     COLONOSCOPY N/A 5/3/2022    Procedure: COLONOSCOPY;  Surgeon: Guru Winsome Dias MD;  Location: [facility] GI     ENT SURGERY       EYE SURGERY  2012     FLEXIBLE SIGMOIDOSCOPY  11/03/2011     HYSTERECTOMY TOTAL ABDOMINAL, BILATERAL SALPINGO-OOPHORECTOMY, COMBINED  11/07/2011    Procedure:COMBINED HYSTERECTOMY TOTAL ABDOMINAL, BILATERAL SALPINGO-OOPHORECTOMY; total abdominal hysterectomy, bilateral salpingo-oophorectomy; Surgeon:ALETA MANUEL; Location:[facility] OR     HYSTERECTOMY TOTAL ABDOMINAL, BILATERAL SALPINGO-OOPHORECTOMY, COMBINED  11/07/2011     ILEOSTOMY  02/01/2012    takedown loop ileostomy      INSERT STENT URETER  11/07/2011    Procedure:INSERT STENT URETER; Placement of Bilateral Ureteral Stents ; Surgeon:PRANEETH BRYANT; Location:[facility] OR     SIGMOIDECTOMY  02/01/2012    Dr. Siddiqi, Sigmoidectomy for diverticular abscess, iliostomy afterwards until repair     SIGMOIDOSCOPY FLEXIBLE  11/03/2011     Procedure:SIGMOIDOSCOPY FLEXIBLE; Flexible Sigmoidoscopy; Surgeon:CK CASTANEDA; Location:[facility] OR     TAKEDOWN ILEOSTOMY  02/01/2012    Procedure:TAKEDOWN ILEOSTOMY; Takedown Loop Ileostomy ; Surgeon:CK CASTANEDA; Location:[facility] OR     URETERAL STENT PLACEMENT  11/07/2011     [facility] APPENDECTOMY  1970's?     [facility] NONSPECIFIC PROCEDURE  11/2005    exploratory abd lap, adhesions, resolved RLQ pain, diverticulitis episodes     Current Outpatient Medications   Medication Sig Dispense Refill     ACCU-CHEK SHANNON PLUS test strip USE TO TEST BLOOD SUGARS 3 TIMES DAILY 300 strip 5     acetaminophen (TYLENOL) 500 MG tablet Take 1,000 mg by mouth every 8 hours as needed (max 6 tablets/24 hours, 2 tablets/dose)        acyclovir (ZOVIRAX) 400 MG tablet Take 1 tablet (400 mg) by mouth 3 times daily For a couple days 15 tablet 2     acyclovir (ZOVIRAX) 5 % external ointment Apply topically 6 times daily As needed for outbreaks 15 g 3     albuterol (PROAIR HFA/PROVENTIL HFA/VENTOLIN HFA) 108 (90 Base) MCG/ACT inhaler Inhale 2 puffs into the lungs every 6 hours 18 g 11     alendronate (FOSAMAX) 70 MG tablet Take 1 tablet (70 mg) by mouth every 7 days 12 tablet 0     anakinra (KINERET) 100 MG/0.67ML SOSY injection 100 mg every day x 3 days as needed for flare ups 6.7 mL 3     atorvastatin (LIPITOR) 10 MG tablet TAKE 1/2 TABLET BY MOUTH EVERY DAY 45 tablet 2     azithromycin (ZITHROMAX) 250 MG tablet TAKE 1 TABLET BY MOUTH EVERY DAY 30 tablet 5     BD PEN NEEDLE JANE 2ND GEN 32G X 4 MM miscellaneous USE 4 DAILY AS DIRECTED. 400 each 1     blood glucose (NO BRAND SPECIFIED) lancets standard Use to test blood sugar 3 times daily or as directed 100 lancet 3     cevimeline (EVOXAC) 30 MG capsule Take 1 capsule (30 mg) by mouth 3 times daily (Patient taking differently: Take 30 mg by mouth every other day) 270 capsule 2     Cholecalciferol (VITAMIN D3) 50 MCG (2000 UT) CAPS Take 100 mcg by mouth daily (Take 2 tablet (50 mcg) by mouth daily -  Oral) 90 capsule 1     COMPOUNDED NON-CONTROLLED SUBSTANCE (CMPD RX) - PHARMACY TO MIX COMPOUNDED MEDICATION Estriol 1 mg/g Apply small amount to finger and apply to inside vagina daily for 2 weeks then twice weekly Route: vaginally Dispense 30 grams 11 refills (Patient taking differently: Estriol 1 mg/g Apply small amount to finger and apply to inside vagina twice weekly Route: vaginally Dispense 30 grams 11 refills) 30 g 11     cyanocobalamin (VITAMIN B-12) 1000 MCG tablet Take 1 tablet (1,000 mcg) by mouth daily 30 tablet 1     escitalopram (LEXAPRO) 20 MG tablet TAKE 1 TABLET BY MOUTH EVERY DAY 90 tablet 0     ferrous sulfate (FEROSUL) 325 (65 Fe) MG tablet TAKE 1 TABLET BY MOUTH EVERY DAY WITH BREAKFAST 90 tablet 0     fluticasone (FLONASE) 50 MCG/ACT nasal spray SPRAY 1-2 SPRAYS INTO BOTH NOSTRILS DAILY AS NEEDED FOR ALLERGIES 16 mL 11     fluticasone-vilanterol (BREO ELLIPTA) 100-25 MCG/INH inhaler Inhale 1 puff into the lungs daily 1 each 11     HYDROcodone-acetaminophen (NORCO) 7.5-325 MG per tablet Take 1 tablet by mouth every 12 hours OK to fill and start 06/28/22 60 tablet 0     hydroxychloroquine (PLAQUENIL) 200 MG tablet Take 2 tablets (400 mg) by mouth daily Get annual eye exams for hydroxychloroquine (Plaquenil) monitoring and fax to [phone] 180 tablet 3     insulin glargine (BASAGLAR KWIKPEN) 100 UNIT/ML pen INJECT 22 UNITS SUBCUTANEOUS DAILY (TO REPLACE LANTUS) 15 mL 0     ketoconazole (NIZORAL) 2 % external cream Apply topically 2 times daily as needed for itching 60 g 1     KLOR-CON 20 MEQ CR tablet TAKE 2 TABLETS (40 MEQ) BY MOUTH EVERY MORNING AND 1 TABLET (20 MEQ) EVERY EVENING. 270 tablet 3     lidocaine (LIDODERM) 5 % patch APPLY PATCH TO PAINFUL AREA FOR UP TO 12 H WITHIN A 24 H PERIOD. REMOVE AFTER 12 HOURS. 30 patch 2     loratadine (CLARITIN) 10 MG tablet TAKE 1 TABLET BY MOUTH EVERY DAY 90 tablet 3     methocarbamol (ROBAXIN) 750 MG tablet Take 1 tablet (750 mg) by mouth 3 times  daily as needed for muscle spasms 90 tablet 3     metoprolol succinate ER (TOPROL-XL) 50 MG 24 hr tablet Take 1 tablet (50 mg) by mouth 2 times daily 90 tablet 3     montelukast (SINGULAIR) 10 MG tablet TAKE 1 TABLET BY MOUTH EVERYDAY AT BEDTIME 90 tablet 0     NOVOLOG FLEXPEN 100 UNIT/ML soln INJECT 12 UNITS SUBCUTANEOUS 3 TIMES DAILY (WITH MEALS) 15 mL 1     pantoprazole (PROTONIX) 40 MG EC tablet Take 1 tablet (40 mg) by mouth daily (replaces famotidine- should stop taking famotidine) 90 tablet 1     predniSONE (DELTASONE) 5 MG tablet Take 1 tablet (5 mg) by mouth daily 30 tablet 0     rivaroxaban ANTICOAGULANT (XARELTO ANTICOAGULANT) 20 MG TABS tablet TAKE 1 TABLET BY MOUTH DAILY WITH DINNER 90 tablet 3     Semaglutide, 1 MG/DOSE, (OZEMPIC, 1 MG/DOSE,) 4 MG/3ML SOPN Inject 1 mg Subcutaneous once a week (Patient taking differently: Inject 1 mg Subcutaneous once a week Mondays) 9 mL 1     spironolactone (ALDACTONE) 25 MG tablet Take 2 tablets (50 mg) by mouth daily 180 tablet 3     torsemide (DEMADEX) 10 MG tablet TAKE ONE TAB (10 MG) WITH ONE 20 MG TAB TO = 30 MG DAILY IN AFTERNOON. 90 tablet 3     torsemide (DEMADEX) 20 MG tablet TAKE 2 TABLETS (40 MG) BY MOUTH EVERY MORNING AND 1 TABLET (20 MG) DAILY AT 2 PM. TAKE THE AFTERNOON DOSE WITH AN EXTRA 10 MG TAB FOR A TOTAL OF 30 MG IN THE AFTERNOON 270 tablet 3     traZODone (DESYREL) 50 MG tablet Take 3 tablets (150 mg) by mouth nightly as needed for sleep 135 tablet 1       Allergies   Allergen Reactions     Amoxicillin-Pot Clavulanate      Augmentin Nausea and Vomiting     Codeine Nausea and Vomiting     Codeine      PN: LW Reaction: HIVES     Penicillins Nausea and Vomiting     PN: LW Reaction: GI Upset     Phenobarbital Itching     Phenobarbital      Seasonal Allergies         Social History     Tobacco Use     Smoking status: Former Smoker     Packs/day: 0.50     Years: 10.00     Pack years: 5.00     Types: Cigarettes     Quit date: 8/1/2011     Years since  "quitting: 10.9     Smokeless tobacco: Never Used     Tobacco comment: 1/2 ppd   Substance Use Topics     Alcohol use: No     Alcohol/week: 0.0 standard drinks     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       History   Drug Use No         Objective     /60   Pulse 56   Temp 97.7  F (36.5  C)   Resp 20   Ht 1.651 m (5' 5\")   Wt 85.9 kg (189 lb 4.8 oz)   SpO2 98%   BMI 31.50 kg/m      Physical Exam    GENERAL APPEARANCE: healthy, alert and no distress     EYES: EOMI,  PERRL     HENT:  mouth without ulcers or lesions     NECK: no adenopathy, no asymmetry, masses, or scars and thyroid normal to palpation     RESP: lungs clear to auscultation - no rales, rhonchi or wheezes     CV: regular rates and rhythm, normal S1 S2, no S3 or S4 and no murmur, click or rub     ABDOMEN:  soft, nontender, no HSM or masses and bowel sounds normal     MS: Difficulty ambulating (due to hip pain)       SKIN: no suspicious lesions or rashes     NEURO: Normal strength and tone, sensory exam grossly normal, mentation intact and speech normal     PSYCH: mentation appears normal. and affect normal/bright     LYMPHATICS: No cervical adenopathy    Echo result w/o MOPS 9/9/21: Interpretation SummaryGlobal and regional left ventricular function is normal with an EF of 55-60%.The right ventricle is normal size. Global right ventricular function isnormal.No significant valvular abnormalities.IVC diameter >2.1 cm collapsing <50% with sniff suggests a high RA pressureestimated at 15 mmHg or greater.This study was compared with the study from 08/27/2020. The LA fillingpressures are higher. There are no significant changes in the biventricularfunction or aortic calibers.    Recent Labs   Lab Test 07/08/22  1500 06/15/22  1114 05/31/22  1101 05/25/22  1455 05/16/22  1207 02/23/22  1229 02/11/22  1552   HGB 11.1* 10.5*   < > 9.2* 9.5*   < > 9.1*    283   < > 271 270   < > 283   NA  --   --   --  141 140   < >  --    POTASSIUM  --   --   " --  4.2 4.1   < >  --    CR 1.10*  --   --  1.06* 1.02   < > 1.00   A1C  --   --   --  5.9*  --   --  6.4*    < > = values in this interval not displayed.        Diagnostics:  No labs were ordered during this visit.   No EKG required for low risk surgery (cataract, skin procedure, breast biopsy, etc).    PFT 4/22/22  IMPRESSION:   Normal Pulmonary Function  PFT Latest Ref Rng & Units 4/22/2022   FVC L 1.89   FEV1 L 1.42   FVC% % 71   FEV1% % 70          Revised Cardiac Risk Index (RCRI):  The patient has the following serious cardiovascular risks for perioperative complications:   - Congestive Heart Failure (pulmonary edema, PND, s3 maulik, CXR with pulmonary congestion, basilar rales) = 1 point   - Diabetes Mellitus (on Insulin) = 1 point     RCRI Interpretation: 2 points: Class III (moderate risk - 6.6% complication rate)     Estimated Functional Capacity: CANNOT perform 4 METS without symptoms           Signed Electronically by: William Amaral DO  Copy of this evaluation report is provided to requesting physician.

## 2022-07-14 NOTE — TELEPHONE ENCOUNTER
Call placed to patient to inform her of Dr Rosa's reply:    Radha Rosa MD Arola, Tracy, JASON  Yes OK with plan     To hold Xarelto for 2 days prior to EGD.    Patient verbalized understanding and in agreement with plan.

## 2022-07-18 ENCOUNTER — ANESTHESIA EVENT (OUTPATIENT)
Dept: GASTROENTEROLOGY | Facility: CLINIC | Age: 82
End: 2022-07-18
Payer: MEDICARE

## 2022-07-18 NOTE — PROGRESS NOTES
[facility] - Procedure Note    Date of Service: 7/22/2022    Pre procedure Diagnosis: Right Hip osteoarthritis  Post procedure Diagnosis: Same  Procedure performed: Right intraarticular hip injection  Anesthesia: none  Operators: Alanna Alba MD    Indications:   Betty Munroe is a 82 year old female who is seen for a right hip joint injection.  They have a history of hip pain and arthropathy. The patient describes chronic right sided hip and buttock pain. They have tried conservative treatment including medications and PT.    This is a repeat injection. Last done on 3/9/2022.     Imaging of the hip was done on 12/9/21 showing:  HISTORY: R hip pain, xray requested per chiropractor; Hip pain, right     COMPARISON: MRI 9/5/2020, radiographs 3/4/2020     FINDINGS: AP and frog-leg lateral right hip views were obtained.      No acute osseous abnormality. Severe degenerative changes of the right  hip, progressed compared to prior. Vascular ossifications. Lumbar  spondylosis. Right SI joint degenerative change.                                                                      IMPRESSION: Severe right hip degenerative change, progressed compared  to 3/4/2020.     Allergies:      Allergies   Allergen Reactions     Amoxicillin-Pot Clavulanate      Augmentin Nausea and Vomiting     Codeine Nausea and Vomiting     Codeine      PN: LW Reaction: HIVES     Penicillins Nausea and Vomiting     PN: LW Reaction: GI Upset     Phenobarbital Itching     Phenobarbital      Seasonal Allergies         Vitals:  /47   Pulse 59   SpO2 96%     Medications were reviewed.    Procedure:  Options/alternatives, benefits and risks were discussed with the patient. Risks include but are not limited to: infection, bleeding, increased pain, and damage to soft tissue, nerve, muscle, and vasculature structures. After getting informed consent, patient was brought into the procedure suite and was placed in a  left lateral decubitus position on the procedure table.   A Pause for the Cause was performed.  Patient was prepped and draped in sterile fashion.   The hip joint was identified using AP fluoroscopy.  A 25 gauge 3.5 inch spinal needle was advanced under intermittent fluoroscopy until it was felt to enter the hip joint.   A total of 0.5 mL of Omnipaque-300 was injected, showing appropriate location, with spread into the intraarticular space and no intravascular uptake noted. 9.5 mL of contrast was wasted. Lateral fluoroscopy (patient's true AP) revealed contrast outlining the hip capsule.  A mixture containing, 3 mL of 0.25% bupivacaine and  40 mg of triamcinolone was injected. The needle was removed. Hemostasis was achieved, the area was cleaned, and bandaids were placed when appropriate. Images were saved to PACS.  The patient tolerated the procedure well, and was taken to the recovery room, and there was no evidence of procedural complications. No new sensory or motor deficits were noted following the procedure. The patient was stable and able to ambulate on discharge home. Post-procedure instructions were provided.     Pre-procedure pain score: 4/10  Post-procedure pain score: 3/10    Assessment/Plan: Betty Tee is a 81 year old female s/p right hip joint injection today for hip pain and arthropathy.     1. Following today's procedure, the patient was advised to contact the [facility] for any of the following:   Fever, chills, or night sweats   New onset of pain, numbness, or weakness   Any questions/concerns regarding the procedure  If unable to contact the Pain Center, the patient was instructed to go to a local Emergency Room for any complications.   2. The patient should follow-up with me in 2-4 weeks for post-procedure evaluation.    Alanna Alba MD  [facility]

## 2022-07-19 ENCOUNTER — ANESTHESIA (OUTPATIENT)
Dept: GASTROENTEROLOGY | Facility: CLINIC | Age: 82
End: 2022-07-19
Payer: MEDICARE

## 2022-07-19 ENCOUNTER — HOSPITAL ENCOUNTER (OUTPATIENT)
Facility: CLINIC | Age: 82
Discharge: HOME | End: 2022-07-19
Attending: INTERNAL MEDICINE | Admitting: INTERNAL MEDICINE
Payer: MEDICARE

## 2022-07-19 VITALS
OXYGEN SATURATION: 98 % | HEIGHT: 65 IN | RESPIRATION RATE: 18 BRPM | HEART RATE: 50 BPM | BODY MASS INDEX: 31.55 KG/M2 | TEMPERATURE: 97.7 F | SYSTOLIC BLOOD PRESSURE: 146 MMHG | WEIGHT: 189.38 LBS | DIASTOLIC BLOOD PRESSURE: 62 MMHG

## 2022-07-19 LAB
GLUCOSE BLDC GLUCOMTR-MCNC: 128 MG/DL (ref 70–99)
UPPER GI ENDOSCOPY: NORMAL

## 2022-07-19 PROCEDURE — 258N000003 HC RX IP 258 OP 636: Performed by: ANESTHESIOLOGY

## 2022-07-19 PROCEDURE — 370N000017 HC ANESTHESIA TECHNICAL FEE, PER MIN: Performed by: INTERNAL MEDICINE

## 2022-07-19 PROCEDURE — 82962 GLUCOSE BLOOD TEST: CPT

## 2022-07-19 PROCEDURE — 250N000009 HC RX 250: Performed by: ANESTHESIOLOGY

## 2022-07-19 PROCEDURE — 43270 EGD LESION ABLATION: CPT | Performed by: INTERNAL MEDICINE

## 2022-07-19 PROCEDURE — 250N000011 HC RX IP 250 OP 636: Performed by: ANESTHESIOLOGY

## 2022-07-19 RX ORDER — FLUMAZENIL 0.1 MG/ML
0.2 INJECTION, SOLUTION INTRAVENOUS
Status: DISCONTINUED | OUTPATIENT
Start: 2022-07-19 | End: 2022-07-19 | Stop reason: HOSPADM

## 2022-07-19 RX ORDER — ONDANSETRON 4 MG/1
4 TABLET, ORALLY DISINTEGRATING ORAL EVERY 30 MIN PRN
Status: DISCONTINUED | OUTPATIENT
Start: 2022-07-19 | End: 2022-07-19 | Stop reason: HOSPADM

## 2022-07-19 RX ORDER — OXYCODONE HYDROCHLORIDE 5 MG/1
5 TABLET ORAL EVERY 4 HOURS PRN
Status: DISCONTINUED | OUTPATIENT
Start: 2022-07-19 | End: 2022-07-19 | Stop reason: HOSPADM

## 2022-07-19 RX ORDER — LIDOCAINE 40 MG/G
CREAM TOPICAL
Status: DISCONTINUED | OUTPATIENT
Start: 2022-07-19 | End: 2022-07-19 | Stop reason: HOSPADM

## 2022-07-19 RX ORDER — SODIUM CHLORIDE, SODIUM LACTATE, POTASSIUM CHLORIDE, CALCIUM CHLORIDE 600; 310; 30; 20 MG/100ML; MG/100ML; MG/100ML; MG/100ML
INJECTION, SOLUTION INTRAVENOUS CONTINUOUS PRN
Status: DISCONTINUED | OUTPATIENT
Start: 2022-07-19 | End: 2022-07-19

## 2022-07-19 RX ORDER — ONDANSETRON 2 MG/ML
INJECTION INTRAMUSCULAR; INTRAVENOUS PRN
Status: DISCONTINUED | OUTPATIENT
Start: 2022-07-19 | End: 2022-07-19

## 2022-07-19 RX ORDER — SODIUM CHLORIDE, SODIUM LACTATE, POTASSIUM CHLORIDE, CALCIUM CHLORIDE 600; 310; 30; 20 MG/100ML; MG/100ML; MG/100ML; MG/100ML
INJECTION, SOLUTION INTRAVENOUS CONTINUOUS
Status: DISCONTINUED | OUTPATIENT
Start: 2022-07-19 | End: 2022-07-19 | Stop reason: HOSPADM

## 2022-07-19 RX ORDER — FENTANYL CITRATE 50 UG/ML
25 INJECTION, SOLUTION INTRAMUSCULAR; INTRAVENOUS
Status: DISCONTINUED | OUTPATIENT
Start: 2022-07-19 | End: 2022-07-19 | Stop reason: HOSPADM

## 2022-07-19 RX ORDER — PROPOFOL 10 MG/ML
INJECTION, EMULSION INTRAVENOUS CONTINUOUS PRN
Status: DISCONTINUED | OUTPATIENT
Start: 2022-07-19 | End: 2022-07-19

## 2022-07-19 RX ORDER — NALOXONE HYDROCHLORIDE 0.4 MG/ML
0.2 INJECTION, SOLUTION INTRAMUSCULAR; INTRAVENOUS; SUBCUTANEOUS
Status: DISCONTINUED | OUTPATIENT
Start: 2022-07-19 | End: 2022-07-19 | Stop reason: HOSPADM

## 2022-07-19 RX ORDER — FENTANYL CITRATE 50 UG/ML
25 INJECTION, SOLUTION INTRAMUSCULAR; INTRAVENOUS EVERY 5 MIN PRN
Status: DISCONTINUED | OUTPATIENT
Start: 2022-07-19 | End: 2022-07-19 | Stop reason: HOSPADM

## 2022-07-19 RX ORDER — ONDANSETRON 2 MG/ML
4 INJECTION INTRAMUSCULAR; INTRAVENOUS EVERY 30 MIN PRN
Status: DISCONTINUED | OUTPATIENT
Start: 2022-07-19 | End: 2022-07-19 | Stop reason: HOSPADM

## 2022-07-19 RX ORDER — LIDOCAINE HYDROCHLORIDE 20 MG/ML
INJECTION, SOLUTION INFILTRATION; PERINEURAL PRN
Status: DISCONTINUED | OUTPATIENT
Start: 2022-07-19 | End: 2022-07-19

## 2022-07-19 RX ORDER — NALOXONE HYDROCHLORIDE 0.4 MG/ML
0.4 INJECTION, SOLUTION INTRAMUSCULAR; INTRAVENOUS; SUBCUTANEOUS
Status: DISCONTINUED | OUTPATIENT
Start: 2022-07-19 | End: 2022-07-19 | Stop reason: HOSPADM

## 2022-07-19 RX ORDER — ONDANSETRON 2 MG/ML
4 INJECTION INTRAMUSCULAR; INTRAVENOUS EVERY 6 HOURS PRN
Status: DISCONTINUED | OUTPATIENT
Start: 2022-07-19 | End: 2022-07-19 | Stop reason: HOSPADM

## 2022-07-19 RX ORDER — PROPOFOL 10 MG/ML
INJECTION, EMULSION INTRAVENOUS PRN
Status: DISCONTINUED | OUTPATIENT
Start: 2022-07-19 | End: 2022-07-19

## 2022-07-19 RX ORDER — ONDANSETRON 2 MG/ML
4 INJECTION INTRAMUSCULAR; INTRAVENOUS
Status: DISCONTINUED | OUTPATIENT
Start: 2022-07-19 | End: 2022-07-19 | Stop reason: HOSPADM

## 2022-07-19 RX ORDER — HYDROMORPHONE HYDROCHLORIDE 1 MG/ML
0.2 INJECTION, SOLUTION INTRAMUSCULAR; INTRAVENOUS; SUBCUTANEOUS EVERY 5 MIN PRN
Status: DISCONTINUED | OUTPATIENT
Start: 2022-07-19 | End: 2022-07-19 | Stop reason: HOSPADM

## 2022-07-19 RX ORDER — MEPERIDINE HYDROCHLORIDE 25 MG/ML
12.5 INJECTION INTRAMUSCULAR; INTRAVENOUS; SUBCUTANEOUS
Status: DISCONTINUED | OUTPATIENT
Start: 2022-07-19 | End: 2022-07-19 | Stop reason: HOSPADM

## 2022-07-19 RX ORDER — ONDANSETRON 4 MG/1
4 TABLET, ORALLY DISINTEGRATING ORAL EVERY 6 HOURS PRN
Status: DISCONTINUED | OUTPATIENT
Start: 2022-07-19 | End: 2022-07-19 | Stop reason: HOSPADM

## 2022-07-19 RX ADMIN — SODIUM CHLORIDE, POTASSIUM CHLORIDE, SODIUM LACTATE AND CALCIUM CHLORIDE: 600; 310; 30; 20 INJECTION, SOLUTION INTRAVENOUS at 10:00

## 2022-07-19 RX ADMIN — ONDANSETRON 4 MG: 2 INJECTION INTRAMUSCULAR; INTRAVENOUS at 10:07

## 2022-07-19 RX ADMIN — LIDOCAINE HYDROCHLORIDE 80 MG: 20 INJECTION, SOLUTION INFILTRATION; PERINEURAL at 10:07

## 2022-07-19 RX ADMIN — PROPOFOL 20 MG: 10 INJECTION, EMULSION INTRAVENOUS at 10:18

## 2022-07-19 RX ADMIN — PROPOFOL 150 MCG/KG/MIN: 10 INJECTION, EMULSION INTRAVENOUS at 10:07

## 2022-07-19 RX ADMIN — TOPICAL ANESTHETIC 1 SPRAY: 200 SPRAY DENTAL; PERIODONTAL at 10:03

## 2022-07-19 ASSESSMENT — ENCOUNTER SYMPTOMS: DYSRHYTHMIAS: 1

## 2022-07-19 NOTE — BRIEF OP NOTE
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

## 2022-07-19 NOTE — ANESTHESIA POSTPROCEDURE EVALUATION
Patient: Betty Tee    Procedure: Procedure(s):  ESOPHAGOGASTRODUODENOSCOPY, WITH LESION ABLATION       Anesthesia Type:  MAC    Note:  Disposition: Outpatient   Postop Pain Control: Uneventful            Sign Out: Well controlled pain   PONV: No   Neuro/Psych: Uneventful            Sign Out: Acceptable/Baseline neuro status   Airway/Respiratory: Uneventful            Sign Out: Acceptable/Baseline resp. status   CV/Hemodynamics: Uneventful            Sign Out: Acceptable CV status; No obvious hypovolemia; No obvious fluid overload   Other NRE: NONE   DID A NON-ROUTINE EVENT OCCUR? No           Last vitals:  Vitals:    07/19/22 0913   BP: 109/61   Pulse: (!) 45   Resp: 16   Temp: 36.9  C (98.4  F)   SpO2: 97%       Electronically Signed By: Chidi Peña MD  July 19, 2022  11:04 AM

## 2022-07-19 NOTE — ANESTHESIA PREPROCEDURE EVALUATION
Anesthesia Pre-Procedure Evaluation    Patient: Betty Tee   MRN: [id number] : 1940        Procedure : Procedure(s):  ESOPHAGOGASTRODUODENOSCOPY (EGD)          Past Medical History:   Diagnosis Date     Alcohol abuse, in remission      Allergic rhinitis, cause unspecified     allegra helps when she takes it     Antiplatelet or antithrombotic long-term use      Atrial fibrillation (H)     in hosp in  after surgery w/ fluid overload     Cardiomegaly     LVH on stress echo- cardiac w/u at [facility] ER- neg CT scan for PE, neg stress echo in      Chest pain, unspecified      Congestive heart failure (H)      Depressive disorder      Diabetes (H)      Disorder of bone and cartilage, unspecified     osteopenia (had been on prempro), improved on  dexa, stable dexa 11/10     Diverticulosis of colon (without mention of hemorrhage)     last episode yrs ago     Essential hypertension, benign      Follicular bronchiolitis (H)     associated with Sjogrens, dx by chest CT showing mosaic attenuation and air trapping     Gastro-oesophageal reflux disease      ILD (interstitial lung disease) (H)     associated with Sjogrens, also has mildly elevated IgG4, first noted on chest CT  (mild changes) and also has small airways disease; ILD improved on follow up chest CT 2018.     Insomnia, unspecified     weaned off clonazepam     Irregular heart beat      Lumbago     MRI with NEAL, now seeing Dr. Cain for sciatic sx's     Major depressive disorder, recurrent episode, moderate (H)      Obstructive sleep apnea     uses cpap     Osteoarthrosis, unspecified whether generalized or localized, unspecified site      Sjogren's syndrome (H)     + RG and SSA and lip bx     Sleep apnea      Tobacco use disorder     chantix in , started again in , working      Past Surgical History:   Procedure Laterality Date     APPENDECTOMY       BACK SURGERY       BACK SURGERY       BIOPSY BREAST  2002     Biopsy Left Breast     BIOPSY BREAST Left 09/27/2002     BREAST SURGERY       CARDIAC SURGERY       CARDIAC SURGERY       CHOLECYSTECTOMY  1990's?     CHOLECYSTECTOMY       COLECTOMY LEFT  11/07/2011    Procedure:COLECTOMY LEFT; Laparoscopic mobilization of splenic flexture, sigmoid colectomy, coloprotoscopy, loop illeostomy; Surgeon:CK CASTANEDA; Location:[facility] OR     COLECTOMY LEFT  11/07/2011     COLONOSCOPY  1990,s     COLONOSCOPY N/A 5/3/2022    Procedure: COLONOSCOPY;  Surgeon: Guru Winsome Dias MD;  Location: [facility] GI     ENT SURGERY       EYE SURGERY  2012     FLEXIBLE SIGMOIDOSCOPY  11/03/2011     HYSTERECTOMY TOTAL ABDOMINAL, BILATERAL SALPINGO-OOPHORECTOMY, COMBINED  11/07/2011    Procedure:COMBINED HYSTERECTOMY TOTAL ABDOMINAL, BILATERAL SALPINGO-OOPHORECTOMY; total abdominal hysterectomy, bilateral salpingo-oophorectomy; Surgeon:ALETA MANUEL; Location:[facility] OR     HYSTERECTOMY TOTAL ABDOMINAL, BILATERAL SALPINGO-OOPHORECTOMY, COMBINED  11/07/2011     ILEOSTOMY  02/01/2012    takedown loop ileostomy      INSERT STENT URETER  11/07/2011    Procedure:INSERT STENT URETER; Placement of Bilateral Ureteral Stents ; Surgeon:PRANEETH BRYANT; Location:[facility] OR     SIGMOIDECTOMY  02/01/2012    Dr. Castaneda, Sigmoidectomy for diverticular abscess, iliostomy afterwards until repair     SIGMOIDOSCOPY FLEXIBLE  11/03/2011    Procedure:SIGMOIDOSCOPY FLEXIBLE; Flexible Sigmoidoscopy; Surgeon:CK CASTANEDA; Location:[facility] OR     TAKEDOWN ILEOSTOMY  02/01/2012    Procedure:TAKEDOWN ILEOSTOMY; Takedown Loop Ileostomy ; Surgeon:CK CASTANEDA; Location:[facility] OR     URETERAL STENT PLACEMENT  11/07/2011     ZZC APPENDECTOMY  1970's?     ZZC NONSPECIFIC PROCEDURE  11/2005    exploratory abd lap, adhesions, resolved RLQ pain, diverticulitis episodes      Allergies   Allergen Reactions     Amoxicillin-Pot Clavulanate      Augmentin Nausea and Vomiting     Codeine Nausea and Vomiting     Codeine      PN: LW Reaction:  HIVES     Penicillins Nausea and Vomiting     PN: LW Reaction: GI Upset     Phenobarbital Itching     Phenobarbital      Seasonal Allergies       Social History     Tobacco Use     Smoking status: Former Smoker     Packs/day: 0.50     Years: 10.00     Pack years: 5.00     Types: Cigarettes     Quit date: 8/1/2011     Years since quitting: 10.9     Smokeless tobacco: Never Used     Tobacco comment: 1/2 ppd   Substance Use Topics     Alcohol use: No     Alcohol/week: 0.0 standard drinks     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      Wt Readings from Last 1 Encounters:   07/13/22 85.9 kg (189 lb 4.8 oz)        Anesthesia Evaluation   Pt has had prior anesthetic. Type: General and MAC.    No history of anesthetic complications       ROS/MED HX  ENT/Pulmonary:     (+) sleep apnea, uses CPAP,     Neurologic:       Cardiovascular:     (+) hypertension-----Taking blood thinners CHF dysrhythmias, a-fib, Previous cardiac testing   Echo: Date: 2020 Results:  Global and regional left ventricular function is normal with an EF of 55-60%.  The right ventricle is normal size. Global right ventricular function is  normal.  No significant valvular abnormalities.  IVC diameter >2.1 cm collapsing <50% with sniff suggests a high RA pressure  estimated at 15 mmHg or greater.  This study was compared with the study from 08/27/2020. The LA filling  pressures are higher. There are no significant changes in the biventricular  function or aortic calibers.  Stress Test: Date: Results:    ECG Reviewed: Date: Results:    Cath: Date: Results:      METS/Exercise Tolerance:     Hematologic:     (+) anemia,     Musculoskeletal:   (+) arthritis,     GI/Hepatic:     (+) GERD, Asymptomatic on medication, hepatitis type Other,     Renal/Genitourinary:     (+) renal disease, type: CRI,     Endo:     (+) type II DM, Obesity,     Psychiatric/Substance Use:     (+) psychiatric history depression alcohol abuse     Infectious Disease:       Malignancy:        Other:            Physical Exam    Airway        Mallampati: III   TM distance: > 3 FB   Neck ROM: full   Mouth opening: > 3 cm    Respiratory Devices and Support         Dental           Cardiovascular   cardiovascular exam normal          Pulmonary   pulmonary exam normal                OUTSIDE LABS:  CBC:   Lab Results   Component Value Date    WBC 9.6 07/08/2022    WBC 7.0 06/15/2022    HGB 11.1 (L) 07/08/2022    HGB 10.5 (L) 06/15/2022    HCT 36.6 07/08/2022    HCT 34.7 (L) 06/15/2022     07/08/2022     06/15/2022     BMP:   Lab Results   Component Value Date     05/25/2022     05/16/2022    POTASSIUM 4.2 05/25/2022    POTASSIUM 4.1 05/16/2022    CHLORIDE 108 05/25/2022    CHLORIDE 108 05/16/2022    CO2 24 05/25/2022    CO2 26 05/16/2022    BUN 21 05/25/2022    BUN 19 05/16/2022    CR 1.10 (H) 07/08/2022    CR 1.06 (H) 05/25/2022     (H) 05/31/2022     (H) 05/25/2022     COAGS:   Lab Results   Component Value Date    PTT 35 10/06/2011    INR 1.36 (H) 03/03/2020     POC:   Lab Results   Component Value Date     (H) 10/01/2020     HEPATIC:   Lab Results   Component Value Date    ALBUMIN 3.4 07/08/2022    PROTTOTAL 7.3 12/04/2020    ALT 10 07/08/2022    AST 18 07/08/2022    ALKPHOS 95 12/04/2020    BILITOTAL 0.6 12/04/2020     OTHER:   Lab Results   Component Value Date    PH 7.38 04/06/2017    LACT 2.3 (H) 04/06/2017    A1C 5.9 (H) 05/25/2022    CLAUDY 8.9 05/25/2022    PHOS 3.4 04/11/2017    MAG 2.0 04/11/2017    LIPASE 136 04/02/2017    TSH 2.22 12/09/2021    CRP 10.7 (H) 07/08/2022    SED 30 07/08/2022       Anesthesia Plan    ASA Status:  3      Anesthesia Type: MAC.     - Reason for MAC: straight local not clinically adequate   Induction: Propofol.           Consents    Anesthesia Plan(s) and associated risks, benefits, and realistic alternatives discussed. Questions answered and patient/representative(s) expressed understanding.    - Discussed:     - Discussed  with:  Patient      - Extended Intubation/Ventilatory Support Discussed: No.      - Patient is DNR/DNI Status: No    Use of blood products discussed: No .     Postoperative Care    Pain management: Oral pain medications, Multi-modal analgesia.   PONV prophylaxis: Ondansetron (or other 5HT-3)     Comments:                Chidi Peña MD

## 2022-07-19 NOTE — ANESTHESIA CARE TRANSFER NOTE
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    Anesthesia Type:   MAC     Note:    Oropharynx: oropharynx clear of all foreign objects and spontaneously breathing  Level of Consciousness: awake  Oxygen Supplementation: room air    Independent Airway: airway patency satisfactory and stable  Dentition: dentition unchanged  Vital Signs Stable: post-procedure vital signs reviewed and stable  Report to RN Given: handoff report given  Patient transferred to: Phase II    Handoff Report: Identifed the Patient, Identified the Reponsible Provider, Reviewed the pertinent medical history, Discussed the surgical course, Reviewed Intra-OP anesthesia mangement and issues during anesthesia, Set expectations for post-procedure period and Allowed opportunity for questions and acknowledgement of understanding      Vitals:  Vitals Value Taken Time   BP 99/49    Temp     Pulse     Resp 16    SpO2 94%        Electronically Signed By: LIZY Simmons CRNA  July 19, 2022  10:35 AM

## 2022-07-21 ENCOUNTER — OFFICE VISIT (OUTPATIENT)
Dept: CARDIOLOGY | Facility: CLINIC | Age: 82
End: 2022-07-21
Attending: NURSE PRACTITIONER
Payer: MEDICARE

## 2022-07-21 VITALS
SYSTOLIC BLOOD PRESSURE: 144 MMHG | BODY MASS INDEX: 30.22 KG/M2 | WEIGHT: 188 LBS | OXYGEN SATURATION: 98 % | DIASTOLIC BLOOD PRESSURE: 59 MMHG | HEART RATE: 59 BPM | HEIGHT: 66 IN

## 2022-07-21 DIAGNOSIS — I48.21 PERMANENT ATRIAL FIBRILLATION (H): Primary | ICD-10-CM

## 2022-07-21 PROCEDURE — 99214 OFFICE O/P EST MOD 30 MIN: CPT

## 2022-07-21 PROCEDURE — G0463 HOSPITAL OUTPT CLINIC VISIT: HCPCS

## 2022-07-21 ASSESSMENT — PAIN SCALES - GENERAL: PAINLEVEL: NO PAIN (0)

## 2022-07-21 NOTE — NURSING NOTE
Chief Complaint   Patient presents with     New Patient     Discuss Watchman procedure       Vitals were taken and medications reconciled.    Gary Brower, EMT  3:44 PM

## 2022-07-21 NOTE — LETTER
7/21/2022      RE: Betty Tee  [address]  [facility] [id number]       Dear Colleague,    Thank you for the opportunity to participate in the care of your patient, Betty Tee, at the [facility] [place] at [facility]. Please see a copy of my visit note below.        [facility]  CARDIOVASCULAR DIVISION    INITIAL CONSULTATION          PERTINENT CLINICAL HISTORY:   Betty Buckner is a very pleasant 82 year old female with paroxysmal atrial fibrillation on Xarelto referred to our clinic for evaluation and consideration of left atrial appendage closure. Their remaining medical history is also notable for HFpEF, interstitial lung disease (moderate restriction), Sjogren's syndrome, HTN, atrial fibrillation, diverticulitis s/p colectomy 2011, GIB d/t GAVE.    She has had 3 EGDs and 1 colonoscopy in the past 6 months due to GIB and anemia. 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 She was referred to us by GI for Watchman eval given h/o recurrent GIBs. Denies current bleeding. The patient is currently on Xarelto.       PAST MEDICAL HISTORY:     Past Medical History:   Diagnosis Date     Alcohol abuse, in remission      Allergic rhinitis, cause unspecified     allegra helps when she takes it     Antiplatelet or antithrombotic long-term use      Atrial fibrillation (H)     in hosp in 11/11 after surgery w/ fluid overload     Cardiomegaly     LVH on stress echo- cardiac w/u at [facility] ER- neg CT scan for PE, neg stress echo in 8/06     Chest pain, unspecified      Congestive heart failure (H)      Depressive disorder      Diabetes (H)      Disorder of bone and cartilage, unspecified     osteopenia (had been on prempro), improved on 6/06 dexa, stable dexa 11/10     Diverticulosis of colon (without mention of hemorrhage)     last episode yrs ago     Essential hypertension, benign      Follicular bronchiolitis (H)     associated with  Sjogrens, dx by chest CT showing mosaic attenuation and air trapping     Gastro-oesophageal reflux disease      ILD (interstitial lung disease) (H)     associated with Sjogrens, also has mildly elevated IgG4, first noted on chest CT 2015 (mild changes) and also has small airways disease; ILD improved on follow up chest CT 2018.     Insomnia, unspecified     weaned off clonazepam     Irregular heart beat      Lumbago 7/09    MRI with DJD, now seeing Dr. Cain for sciatic sx's     Major depressive disorder, recurrent episode, moderate (H)      Obstructive sleep apnea     uses cpap     Osteoarthrosis, unspecified whether generalized or localized, unspecified site      Sjogren's syndrome (H)     + RG and SSA and lip bx     Sleep apnea      Tobacco use disorder     chantix in 9/07, started again in 6/08, working        PAST SURGICAL HISTORY:     Past Surgical History:   Procedure Laterality Date     APPENDECTOMY       BACK SURGERY  1962     BACK SURGERY  1962     BIOPSY BREAST  09/27/2002    Biopsy Left Breast     BIOPSY BREAST Left 09/27/2002     BREAST SURGERY       CARDIAC SURGERY       CARDIAC SURGERY       CHOLECYSTECTOMY  1990's?     CHOLECYSTECTOMY       COLECTOMY LEFT  11/07/2011    Procedure:COLECTOMY LEFT; Laparoscopic mobilization of splenic flexture, sigmoid colectomy, coloprotoscopy, loop illeostomy; Surgeon:CK CASTANEDA; Location:[facility] OR     COLECTOMY LEFT  11/07/2011     COLONOSCOPY  1990,s     COLONOSCOPY N/A 5/3/2022    Procedure: COLONOSCOPY;  Surgeon: Guru Winsome Dias MD;  Location: [facility]     ENT SURGERY       EYE SURGERY  2012     FLEXIBLE SIGMOIDOSCOPY  11/03/2011     HYSTERECTOMY TOTAL ABDOMINAL, BILATERAL SALPINGO-OOPHORECTOMY, COMBINED  11/07/2011    Procedure:COMBINED HYSTERECTOMY TOTAL ABDOMINAL, BILATERAL SALPINGO-OOPHORECTOMY; total abdominal hysterectomy, bilateral salpingo-oophorectomy; Surgeon:ALETA MANUEL; Location:[facility] OR     HYSTERECTOMY TOTAL ABDOMINAL, BILATERAL  SALPINGO-OOPHORECTOMY, COMBINED  11/07/2011     ILEOSTOMY  02/01/2012    takedown loop ileostomy      INSERT STENT URETER  11/07/2011    Procedure:INSERT STENT URETER; Placement of Bilateral Ureteral Stents ; Surgeon:PRANEETH BRYANT; Location:[facility] OR     SIGMOIDECTOMY  02/01/2012    Dr. Siddiqi, Sigmoidectomy for diverticular abscess, iliostomy afterwards until repair     SIGMOIDOSCOPY FLEXIBLE  11/03/2011    Procedure:SIGMOIDOSCOPY FLEXIBLE; Flexible Sigmoidoscopy; Surgeon:CK SIDDIQI; Location:[facility] OR     TAKEDOWN ILEOSTOMY  02/01/2012    Procedure:TAKEDOWN ILEOSTOMY; Takedown Loop Ileostomy ; Surgeon:CK SIDDIQI; Location:[facility] OR     URETERAL STENT PLACEMENT  11/07/2011     Z APPENDECTOMY  1970's?     [facility] NONSPECIFIC PROCEDURE  11/2005    exploratory abd lap, adhesions, resolved RLQ pain, diverticulitis episodes        CURRENT MEDICATIONS:     Current Outpatient Medications   Medication Sig Dispense Refill     ACCU-CHEK SHANNON PLUS test strip USE TO TEST BLOOD SUGARS 3 TIMES DAILY 300 strip 5     acetaminophen (TYLENOL) 500 MG tablet Take 1,000 mg by mouth every 8 hours as needed (max 6 tablets/24 hours, 2 tablets/dose)        acyclovir (ZOVIRAX) 400 MG tablet Take 1 tablet (400 mg) by mouth 3 times daily For a couple days 15 tablet 2     acyclovir (ZOVIRAX) 5 % external ointment Apply topically 6 times daily As needed for outbreaks 15 g 3     albuterol (PROAIR HFA/PROVENTIL HFA/VENTOLIN HFA) 108 (90 Base) MCG/ACT inhaler Inhale 2 puffs into the lungs every 6 hours 18 g 11     alendronate (FOSAMAX) 70 MG tablet Take 1 tablet (70 mg) by mouth every 7 days 12 tablet 0     anakinra (KINERET) 100 MG/0.67ML SOSY injection 100 mg every day x 3 days as needed for flare ups 6.7 mL 3     atorvastatin (LIPITOR) 10 MG tablet TAKE 1/2 TABLET BY MOUTH EVERY DAY 45 tablet 2     azithromycin (ZITHROMAX) 250 MG tablet TAKE 1 TABLET BY MOUTH EVERY DAY 30 tablet 5     BD PEN NEEDLE JANE 2ND GEN 32G X 4 MM miscellaneous USE  4 DAILY AS DIRECTED. 400 each 1     blood glucose (NO BRAND SPECIFIED) lancets standard Use to test blood sugar 3 times daily or as directed 100 lancet 3     cevimeline (EVOXAC) 30 MG capsule Take 1 capsule (30 mg) by mouth 3 times daily (Patient taking differently: Take 30 mg by mouth every other day) 270 capsule 2     Cholecalciferol (VITAMIN D3) 50 MCG (2000 UT) CAPS Take 100 mcg by mouth daily (Take 2 tablet (50 mcg) by mouth daily - Oral) 90 capsule 1     COMPOUNDED NON-CONTROLLED SUBSTANCE (CMPD RX) - PHARMACY TO MIX COMPOUNDED MEDICATION Estriol 1 mg/g Apply small amount to finger and apply to inside vagina daily for 2 weeks then twice weekly Route: vaginally Dispense 30 grams 11 refills (Patient taking differently: Estriol 1 mg/g Apply small amount to finger and apply to inside vagina twice weekly Route: vaginally Dispense 30 grams 11 refills) 30 g 11     cyanocobalamin (VITAMIN B-12) 1000 MCG tablet Take 1 tablet (1,000 mcg) by mouth daily 30 tablet 1     escitalopram (LEXAPRO) 20 MG tablet TAKE 1 TABLET BY MOUTH EVERY DAY 90 tablet 0     ferrous sulfate (FEROSUL) 325 (65 Fe) MG tablet TAKE 1 TABLET BY MOUTH EVERY DAY WITH BREAKFAST 90 tablet 0     fluticasone (FLONASE) 50 MCG/ACT nasal spray SPRAY 1-2 SPRAYS INTO BOTH NOSTRILS DAILY AS NEEDED FOR ALLERGIES 16 mL 11     fluticasone-vilanterol (BREO ELLIPTA) 100-25 MCG/INH inhaler Inhale 1 puff into the lungs daily 1 each 11     HYDROcodone-acetaminophen (NORCO) 7.5-325 MG per tablet Take 1 tablet by mouth every 12 hours OK to fill and start 06/28/22 60 tablet 0     hydroxychloroquine (PLAQUENIL) 200 MG tablet Take 2 tablets (400 mg) by mouth daily Get annual eye exams for hydroxychloroquine (Plaquenil) monitoring and fax to [phone] 180 tablet 3     insulin glargine (BASAGLAR KWIKPEN) 100 UNIT/ML pen INJECT 22 UNITS SUBCUTANEOUS DAILY (TO REPLACE LANTUS) 15 mL 0     ketoconazole (NIZORAL) 2 % external cream Apply topically 2 times daily as needed for  itching 60 g 1     KLOR-CON 20 MEQ CR tablet TAKE 2 TABLETS (40 MEQ) BY MOUTH EVERY MORNING AND 1 TABLET (20 MEQ) EVERY EVENING. 270 tablet 3     lidocaine (LIDODERM) 5 % patch APPLY PATCH TO PAINFUL AREA FOR UP TO 12 H WITHIN A 24 H PERIOD. REMOVE AFTER 12 HOURS. 30 patch 2     loratadine (CLARITIN) 10 MG tablet TAKE 1 TABLET BY MOUTH EVERY DAY 90 tablet 3     methocarbamol (ROBAXIN) 750 MG tablet Take 1 tablet (750 mg) by mouth 3 times daily as needed for muscle spasms 90 tablet 3     metoprolol succinate ER (TOPROL-XL) 50 MG 24 hr tablet Take 1 tablet (50 mg) by mouth 2 times daily 90 tablet 3     montelukast (SINGULAIR) 10 MG tablet TAKE 1 TABLET BY MOUTH EVERYDAY AT BEDTIME 90 tablet 0     NOVOLOG FLEXPEN 100 UNIT/ML soln INJECT 12 UNITS SUBCUTANEOUS 3 TIMES DAILY (WITH MEALS) 15 mL 1     pantoprazole (PROTONIX) 40 MG EC tablet Take 1 tablet (40 mg) by mouth daily (replaces famotidine- should stop taking famotidine) 90 tablet 1     predniSONE (DELTASONE) 5 MG tablet Take 1 tablet (5 mg) by mouth daily 30 tablet 0     Semaglutide, 1 MG/DOSE, (OZEMPIC, 1 MG/DOSE,) 4 MG/3ML SOPN Inject 1 mg Subcutaneous once a week (Patient taking differently: Inject 1 mg Subcutaneous once a week Mondays) 9 mL 1     spironolactone (ALDACTONE) 25 MG tablet Take 2 tablets (50 mg) by mouth daily 180 tablet 3     torsemide (DEMADEX) 10 MG tablet TAKE ONE TAB (10 MG) WITH ONE 20 MG TAB TO = 30 MG DAILY IN AFTERNOON. 90 tablet 3     torsemide (DEMADEX) 20 MG tablet TAKE 2 TABLETS (40 MG) BY MOUTH EVERY MORNING AND 1 TABLET (20 MG) DAILY AT 2 PM. TAKE THE AFTERNOON DOSE WITH AN EXTRA 10 MG TAB FOR A TOTAL OF 30 MG IN THE AFTERNOON 270 tablet 3     traZODone (DESYREL) 50 MG tablet Take 3 tablets (150 mg) by mouth nightly as needed for sleep 135 tablet 1     rivaroxaban ANTICOAGULANT (XARELTO ANTICOAGULANT) 20 MG TABS tablet TAKE 1 TABLET BY MOUTH DAILY WITH DINNER (Patient not taking: Reported on 7/21/2022) 90 tablet 3        ALLERGIES:      Allergies   Allergen Reactions     Amoxicillin-Pot Clavulanate      Augmentin Nausea and Vomiting     Codeine Nausea and Vomiting     Codeine      PN: LW Reaction: HIVES     Penicillins Nausea and Vomiting     PN: LW Reaction: GI Upset     Phenobarbital Itching     Phenobarbital      Seasonal Allergies         FAMILY HISTORY:     Family History   Problem Relation Age of Onset     C.A.D. Mother 63        MI- first at age 63     Heart Disease Mother      Hypertension Mother      Cerebrovascular Disease Mother      Hyperlipidemia Mother      Coronary Artery Disease Mother         MI-first at age 63     Other - See Comments Mother         cerebrovascular disease      Alcohol/Drug Father      Alzheimer Disease Father      Dementia Father      Hypertension Father      Hyperlipidemia Father      Substance Abuse Father      Diabetes Sister      Hypertension Sister      Hyperlipidemia Sister      Substance Abuse Sister      Asthma Sister      C.A.D. Sister 52        Minor MI- age 50's     Heart Disease Sister      Hypertension Sister      Hypertension Sister      Cancer - colorectal Sister 48        Late 40's early 50's     Gastrointestinal Disease Sister         Diverticulitis     Lipids Sister      Lipids Sister      Diabetes Sister      Heart Disease Sister         CHF     Cancer Sister         lung, smoker     Substance Abuse Sister      Asthma Sister      Cancer Sister      Coronary Artery Disease Sister         minor MI-age 50's     Heart Disease Sister      Hypertension Sister      Hypertension Sister      Colon Cancer Sister      Diverticulitis Sister      Lung Cancer Sister      Heart Disease Sister         CHF     Diabetes Sister      Asthma Sister      Hypertension Brother      Prostate Cancer Brother 74        Dx'd age 74     Gastrointestinal Disease Brother         Diverticulitis     Parkinsonism Brother      Substance Abuse Brother      Prostate Cancer Brother      Hyperlipidemia Brother      Hypertension  Brother      Prostate Cancer Brother      Diverticulitis Brother      Parkinsonism Brother      Breast Cancer Daughter      Breast Cancer Daughter      Diabetes Other      Hypertension Other      Anesthesia Reaction No family hx of      Deep Vein Thrombosis (DVT) No family hx of         SOCIAL HISTORY:     Social History     Socioeconomic History     Marital status: Single     Number of children: 0     Years of education: Ed Spec De   Occupational History     Occupation: Professor     Employer: [employer]     Comment: [employer]   Tobacco Use     Smoking status: Former Smoker     Packs/day: 0.50     Years: 10.00     Pack years: 5.00     Types: Cigarettes     Quit date: 8/1/2011     Years since quitting: 10.9     Smokeless tobacco: Never Used     Tobacco comment: 1/2 ppd   Substance and Sexual Activity     Alcohol use: No     Alcohol/week: 0.0 standard drinks     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     Drug use: No     Sexual activity: Never   Other Topics Concern     Parent/sibling w/ CABG, MI or angioplasty before 65F 55M? Yes   Social History Narrative                    REVIEW OF SYSTEMS:     Constitutional: No fevers or chills  Skin: No new rash or itching  Eyes: No acute change in vision  Ears/Nose/Throat: No purulent rhinorrhea, new hearing loss, or new vertigo  Respiratory: No cough or hemoptysis  Cardiovascular: See HPI  Gastrointestinal: No change in appetite, vomiting, hematemesis or diarrhea  Genitourinary: No dysuria or hematuria  Musculoskeletal: No new back pain, neck pain or muscle pain  Neurologic: No new headaches, focal weakness or behavior changes  Psychiatric: No hallucinations, excessive alcohol consumption or illegal drug usage  Hematologic/Lymphatic/Immunologic: No bleeding, chills, fever, night sweats or weight loss  Endocrine: No new cold intolerance, heat intolerance, polyphagia, polydipsia or polyuria      PHYSICAL EXAMINATION:     BP (!)  "144/59 (BP Location: Right arm, Patient Position: Chair, Cuff Size: Adult Regular)   Pulse 59   Ht 1.676 m (5' 5.98\")   Wt 85.3 kg (188 lb)   SpO2 98%   BMI 30.36 kg/m      GENERAL: No acute distress.  HEENT: EOMI. Sclerae white, not injected. Nares clear. Pharynx without erythema or exudate.   Neck: No adenopathy. No thyromegaly. Symmetrical.   Heart: irregular irregular rate. No murmur.   Lungs: Clear to auscultation. No ronchi, wheezes, rales.   Abdomen: Soft, nontender, nondistended. Bowel sounds present.  Extremities: No clubbing, cyanosis, or edema.   Neurologic: Alert and oriented to person/place/time, normal speech and affect. No focal deficits.  Skin: No petechiae, purpura or rash.     LABORATORY DATA:     LIPID RESULTS:  Lab Results   Component Value Date    CHOL 104 12/09/2021    CHOL 115 10/20/2020    HDL 59 12/09/2021    HDL 71 10/20/2020    LDL 16 12/09/2021    LDL 19 10/20/2020    TRIG 145 12/09/2021    TRIG 125 10/20/2020    CHOLHDLRATIO 2.5 08/06/2014       LIVER ENZYME RESULTS:  Lab Results   Component Value Date    AST 18 07/08/2022    AST 17 06/04/2021    ALT 10 07/08/2022    ALT 25 06/04/2021       CBC RESULTS:  Lab Results   Component Value Date    WBC 9.6 07/08/2022    WBC 8.6 06/04/2021    RBC 3.85 07/08/2022    RBC 4.46 06/04/2021    HGB 11.1 (L) 07/08/2022    HGB 13.4 06/04/2021    HCT 36.6 07/08/2022    HCT 42.1 06/04/2021    MCV 95 07/08/2022    MCV 94 06/04/2021    MCH 28.8 07/08/2022    MCH 30.0 06/04/2021    MCHC 30.3 (L) 07/08/2022    MCHC 31.8 06/04/2021    RDW 14.7 07/08/2022    RDW 15.5 (H) 06/04/2021     07/08/2022     06/04/2021       BMP RESULTS:  Lab Results   Component Value Date     05/25/2022     06/04/2021    POTASSIUM 4.2 05/25/2022    POTASSIUM 4.0 06/04/2021    CHLORIDE 108 05/25/2022    CHLORIDE 106 06/04/2021    CO2 24 05/25/2022    CO2 25 06/04/2021    ANIONGAP 9 05/25/2022    ANIONGAP 6 06/04/2021     (H) 07/19/2022     " (H) 05/25/2022     (H) 06/04/2021    BUN 21 05/25/2022    BUN 14 06/04/2021    CR 1.10 (H) 07/08/2022    CR 1.11 (H) 06/04/2021    GFRESTIMATED 50 (L) 07/08/2022    GFRESTIMATED 47 (L) 06/04/2021    GFRESTBLACK 54 (L) 06/04/2021    CLAUDY 8.9 05/25/2022    CLAUDY 8.8 06/04/2021        A1C RESULTS:  Lab Results   Component Value Date    A1C 5.9 (H) 05/25/2022    A1C 7.1 (H) 06/04/2021       INR RESULTS:  Lab Results   Component Value Date    INR 1.36 (H) 03/03/2020    INR 2.37 (H) 02/14/2019          PROCEDURES & FURTHER ASSESSMENTS:     ECG on 2/11/22:      Echocardiogram  9/9/21  Interpretation Summary  Global and regional left ventricular function is normal with an EF of 55-60%.  The right ventricle is normal size. Global right ventricular function is  normal.  No significant valvular abnormalities.  IVC diameter >2.1 cm collapsing <50% with sniff suggests a high RA pressure  estimated at 15 mmHg or greater.  This study was compared with the study from 08/27/2020. The LA filling  pressures are higher. There are no significant changes in the biventricular  function or aortic calibers.    [id number] Score: 5 (age >75, HTN, CHF, gender) (7.2% annual stroke risk)  HASBLED Score: 4 (8.9% risk of bleeding)     CLINICAL IMPRESSION:     Betty Tee is a 82 year old female with atrial fibrillation who is a good candidate for left atrial appendage closure. Long term anticoagulation for atrial fibrillation is not a good option secondary to her GI bleeding issues.  The patient is suitable for short-term anticoagulation and is expected to tolerate a brief period of anticoagulation of at least 45 days following the procedure.   The risks and benefits of left atrial closure were discussed and the patient agrees to proceed with further evaluation and closure of their left atrial appendage if possible.      Plan Summary:  1) Left atrial appendage closure with the WATCHMAN device.  2) ZACHARY CT prior to size WATCHMAN    Patient  was evaluated in clinic with Dr. Crews of interventional cardiology.    Aixa Yanez MD  Interventional Cardiology Fellow    CC  Patient Care Team:  Ilene Vogt MD as PCP - General  Ilene Vogt MD as Assigned PCP  Kirill Polk MD as MD (Otolaryngology)  Aubrey Hurtado MD as MD (Cardiology)  Anderson Perez MD as MD (Clinical Cardiac Electrophysiology)  Radha Rosa MD as MD (Cardiology)  Kiesha Elias APRN CNP as Nurse Practitioner (Cardiology)  Beatriz Blanco, RN as Nurse Coordinator (Cardiology)  Carmenza Stringer MD as MD (Rheumatology)  Danay Payne, JASON as Specialty Care Coordinator (Cardiology)  Sabrina Meek, [facility] as Pharmacist (Pharmacist)  Flor Velasquez, RN as Specialty Care Coordinator (Cardiology)  Zulma Lopez MD as MD (Rheumatology)  Kami Lang MD as MD (Ophthalmology)  Reina Betancur MD as MD (Internal Medicine)  Zulma Lopez MD as Referring Physician (Rheumatology)  Zulma Lopez MD as Assigned Rheumatology Provider  Maryjane Tillman MD as Assigned Pulmonology Provider  Kelechi Santana MD as Assigned Surgical Provider  Sabrina Meek [facility] as Assigned MTM Pharmacist  Moe, Radha Crawford MD as Assigned Heart and Vascular Provider  ILENE VOGT        Please do not hesitate to contact me if you have any questions/concerns.     Sincerely,     [facility]

## 2022-07-21 NOTE — PROGRESS NOTES
[facility] HEART CARE  CARDIOVASCULAR DIVISION    INITIAL CONSULTATION          PERTINENT CLINICAL HISTORY:   Betty Buckner is a very pleasant 82 year old female with paroxysmal atrial fibrillation on Xarelto referred to our clinic for evaluation and consideration of left atrial appendage closure. Their remaining medical history is also notable for HFpEF, interstitial lung disease (moderate restriction), Sjogren's syndrome, HTN, atrial fibrillation, diverticulitis s/p colectomy 2011, GIB d/t GAVE.    She has had 3 EGDs and 1 colonoscopy in the past 6 months due to GIB and anemia. 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 She was referred to us by GI for Watchman eval given h/o recurrent GIBs. Denies current bleeding. The patient is currently on Xarelto.       PAST MEDICAL HISTORY:     Past Medical History:   Diagnosis Date    Alcohol abuse, in remission     Allergic rhinitis, cause unspecified     allegra helps when she takes it    Antiplatelet or antithrombotic long-term use     Atrial fibrillation (H)     in hosp in 11/11 after surgery w/ fluid overload    Cardiomegaly     LVH on stress echo- cardiac w/u at [facility] ER- neg CT scan for PE, neg stress echo in 8/06    Chest pain, unspecified     Congestive heart failure (H)     Depressive disorder     Diabetes (H)     Disorder of bone and cartilage, unspecified     osteopenia (had been on prempro), improved on 6/06 dexa, stable dexa 11/10    Diverticulosis of colon (without mention of hemorrhage)     last episode yrs ago    Essential hypertension, benign     Follicular bronchiolitis (H)     associated with Sjogrens, dx by chest CT showing mosaic attenuation and air trapping    Gastro-oesophageal reflux disease     ILD (interstitial lung disease) (H)     associated with Sjogrens, also has mildly elevated IgG4, first noted on chest CT 2015 (mild changes) and also has small airways disease; ILD improved on follow up chest CT 2018.    Insomnia, unspecified     weaned off  clonazepam    Irregular heart beat     Lumbago 7/09    MRI with [facility], now seeing Dr. Cain for sciatic sx's    Major depressive disorder, recurrent episode, moderate (H)     Obstructive sleep apnea     uses cpap    Osteoarthrosis, unspecified whether generalized or localized, unspecified site     Sjogren's syndrome (H)     + RG and SSA and lip bx    Sleep apnea     Tobacco use disorder     chantix in 9/07, started again in 6/08, working        PAST SURGICAL HISTORY:     Past Surgical History:   Procedure Laterality Date    APPENDECTOMY      BACK SURGERY  1962    BACK SURGERY  1962    BIOPSY BREAST  09/27/2002    Biopsy Left Breast    BIOPSY BREAST Left 09/27/2002    BREAST SURGERY      CARDIAC SURGERY      CARDIAC SURGERY      CHOLECYSTECTOMY  1990's?    CHOLECYSTECTOMY      COLECTOMY LEFT  11/07/2011    Procedure:COLECTOMY LEFT; Laparoscopic mobilization of splenic flexture, sigmoid colectomy, coloprotoscopy, loop illeostomy; Surgeon:CK SIDDIQI; Location:[facility] OR    COLECTOMY LEFT  11/07/2011    COLONOSCOPY  1990,s    COLONOSCOPY N/A 5/3/2022    Procedure: COLONOSCOPY;  Surgeon: Guru Winsome Dias MD;  Location: [facility] GI    ENT SURGERY      EYE SURGERY  2012    FLEXIBLE SIGMOIDOSCOPY  11/03/2011    HYSTERECTOMY TOTAL ABDOMINAL, BILATERAL SALPINGO-OOPHORECTOMY, COMBINED  11/07/2011    Procedure:COMBINED HYSTERECTOMY TOTAL ABDOMINAL, BILATERAL SALPINGO-OOPHORECTOMY; total abdominal hysterectomy, bilateral salpingo-oophorectomy; Surgeon:ALETA MANUEL; Location:[facility] OR    HYSTERECTOMY TOTAL ABDOMINAL, BILATERAL SALPINGO-OOPHORECTOMY, COMBINED  11/07/2011    ILEOSTOMY  02/01/2012    takedown loop ileostomy     INSERT STENT URETER  11/07/2011    Procedure:INSERT STENT URETER; Placement of Bilateral Ureteral Stents ; Surgeon:PRANEETH BRYANT; Location:[facility] OR    SIGMOIDECTOMY  02/01/2012    Dr. Siddiqi, Sigmoidectomy for diverticular abscess, iliostomy afterwards until repair    SIGMOIDOSCOPY FLEXIBLE   11/03/2011    Procedure:SIGMOIDOSCOPY FLEXIBLE; Flexible Sigmoidoscopy; Surgeon:CK CASTANEDA; Location:[facility] OR    TAKEDOWN ILEOSTOMY  02/01/2012    Procedure:TAKEDOWN ILEOSTOMY; Takedown Loop Ileostomy ; Surgeon:CK CASTANEDA; Location:[facility] OR    URETERAL STENT PLACEMENT  11/07/2011    Z APPENDECTOMY  1970's?    [facility] NONSPECIFIC PROCEDURE  11/2005    exploratory abd lap, adhesions, resolved RLQ pain, diverticulitis episodes        CURRENT MEDICATIONS:     Current Outpatient Medications   Medication Sig Dispense Refill    ACCU-CHEK SHANNON PLUS test strip USE TO TEST BLOOD SUGARS 3 TIMES DAILY 300 strip 5    acetaminophen (TYLENOL) 500 MG tablet Take 1,000 mg by mouth every 8 hours as needed (max 6 tablets/24 hours, 2 tablets/dose)       acyclovir (ZOVIRAX) 400 MG tablet Take 1 tablet (400 mg) by mouth 3 times daily For a couple days 15 tablet 2    acyclovir (ZOVIRAX) 5 % external ointment Apply topically 6 times daily As needed for outbreaks 15 g 3    albuterol (PROAIR HFA/PROVENTIL HFA/VENTOLIN HFA) 108 (90 Base) MCG/ACT inhaler Inhale 2 puffs into the lungs every 6 hours 18 g 11    alendronate (FOSAMAX) 70 MG tablet Take 1 tablet (70 mg) by mouth every 7 days 12 tablet 0    anakinra (KINERET) 100 MG/0.67ML SOSY injection 100 mg every day x 3 days as needed for flare ups 6.7 mL 3    atorvastatin (LIPITOR) 10 MG tablet TAKE 1/2 TABLET BY MOUTH EVERY DAY 45 tablet 2    azithromycin (ZITHROMAX) 250 MG tablet TAKE 1 TABLET BY MOUTH EVERY DAY 30 tablet 5    BD PEN NEEDLE JANE 2ND GEN 32G X 4 MM miscellaneous USE 4 DAILY AS DIRECTED. 400 each 1    blood glucose (NO BRAND SPECIFIED) lancets standard Use to test blood sugar 3 times daily or as directed 100 lancet 3    cevimeline (EVOXAC) 30 MG capsule Take 1 capsule (30 mg) by mouth 3 times daily (Patient taking differently: Take 30 mg by mouth every other day) 270 capsule 2    Cholecalciferol (VITAMIN D3) 50 MCG (2000 UT) CAPS Take 100 mcg by mouth daily (Take 2 tablet  (50 mcg) by mouth daily - Oral) 90 capsule 1    COMPOUNDED NON-CONTROLLED SUBSTANCE (CMPD RX) - PHARMACY TO MIX COMPOUNDED MEDICATION Estriol 1 mg/g Apply small amount to finger and apply to inside vagina daily for 2 weeks then twice weekly Route: vaginally Dispense 30 grams 11 refills (Patient taking differently: Estriol 1 mg/g Apply small amount to finger and apply to inside vagina twice weekly Route: vaginally Dispense 30 grams 11 refills) 30 g 11    cyanocobalamin (VITAMIN B-12) 1000 MCG tablet Take 1 tablet (1,000 mcg) by mouth daily 30 tablet 1    escitalopram (LEXAPRO) 20 MG tablet TAKE 1 TABLET BY MOUTH EVERY DAY 90 tablet 0    ferrous sulfate (FEROSUL) 325 (65 Fe) MG tablet TAKE 1 TABLET BY MOUTH EVERY DAY WITH BREAKFAST 90 tablet 0    fluticasone (FLONASE) 50 MCG/ACT nasal spray SPRAY 1-2 SPRAYS INTO BOTH NOSTRILS DAILY AS NEEDED FOR ALLERGIES 16 mL 11    fluticasone-vilanterol (BREO ELLIPTA) 100-25 MCG/INH inhaler Inhale 1 puff into the lungs daily 1 each 11    HYDROcodone-acetaminophen (NORCO) 7.5-325 MG per tablet Take 1 tablet by mouth every 12 hours OK to fill and start 06/28/22 60 tablet 0    hydroxychloroquine (PLAQUENIL) 200 MG tablet Take 2 tablets (400 mg) by mouth daily Get annual eye exams for hydroxychloroquine (Plaquenil) monitoring and fax to [phone] 180 tablet 3    insulin glargine (BASAGLAR KWIKPEN) 100 UNIT/ML pen INJECT 22 UNITS SUBCUTANEOUS DAILY (TO REPLACE LANTUS) 15 mL 0    ketoconazole (NIZORAL) 2 % external cream Apply topically 2 times daily as needed for itching 60 g 1    KLOR-CON 20 MEQ CR tablet TAKE 2 TABLETS (40 MEQ) BY MOUTH EVERY MORNING AND 1 TABLET (20 MEQ) EVERY EVENING. 270 tablet 3    lidocaine (LIDODERM) 5 % patch APPLY PATCH TO PAINFUL AREA FOR UP TO 12 H WITHIN A 24 H PERIOD. REMOVE AFTER 12 HOURS. 30 patch 2    loratadine (CLARITIN) 10 MG tablet TAKE 1 TABLET BY MOUTH EVERY DAY 90 tablet 3    methocarbamol (ROBAXIN) 750 MG tablet Take 1 tablet (750 mg) by  mouth 3 times daily as needed for muscle spasms 90 tablet 3    metoprolol succinate ER (TOPROL-XL) 50 MG 24 hr tablet Take 1 tablet (50 mg) by mouth 2 times daily 90 tablet 3    montelukast (SINGULAIR) 10 MG tablet TAKE 1 TABLET BY MOUTH EVERYDAY AT BEDTIME 90 tablet 0    NOVOLOG FLEXPEN 100 UNIT/ML soln INJECT 12 UNITS SUBCUTANEOUS 3 TIMES DAILY (WITH MEALS) 15 mL 1    pantoprazole (PROTONIX) 40 MG EC tablet Take 1 tablet (40 mg) by mouth daily (replaces famotidine- should stop taking famotidine) 90 tablet 1    predniSONE (DELTASONE) 5 MG tablet Take 1 tablet (5 mg) by mouth daily 30 tablet 0    Semaglutide, 1 MG/DOSE, (OZEMPIC, 1 MG/DOSE,) 4 MG/3ML SOPN Inject 1 mg Subcutaneous once a week (Patient taking differently: Inject 1 mg Subcutaneous once a week Mondays) 9 mL 1    spironolactone (ALDACTONE) 25 MG tablet Take 2 tablets (50 mg) by mouth daily 180 tablet 3    torsemide (DEMADEX) 10 MG tablet TAKE ONE TAB (10 MG) WITH ONE 20 MG TAB TO = 30 MG DAILY IN AFTERNOON. 90 tablet 3    torsemide (DEMADEX) 20 MG tablet TAKE 2 TABLETS (40 MG) BY MOUTH EVERY MORNING AND 1 TABLET (20 MG) DAILY AT 2 PM. TAKE THE AFTERNOON DOSE WITH AN EXTRA 10 MG TAB FOR A TOTAL OF 30 MG IN THE AFTERNOON 270 tablet 3    traZODone (DESYREL) 50 MG tablet Take 3 tablets (150 mg) by mouth nightly as needed for sleep 135 tablet 1    rivaroxaban ANTICOAGULANT (XARELTO ANTICOAGULANT) 20 MG TABS tablet TAKE 1 TABLET BY MOUTH DAILY WITH DINNER (Patient not taking: Reported on 7/21/2022) 90 tablet 3        ALLERGIES:     Allergies   Allergen Reactions    Amoxicillin-Pot Clavulanate     Augmentin Nausea and Vomiting    Codeine Nausea and Vomiting    Codeine      PN: LW Reaction: HIVES    Penicillins Nausea and Vomiting     PN: LW Reaction: GI Upset    Phenobarbital Itching    Phenobarbital     Seasonal Allergies         FAMILY HISTORY:     Family History   Problem Relation Age of Onset    C.A.D. Mother 63        MI- first at age 63    Heart Disease  Mother     Hypertension Mother     Cerebrovascular Disease Mother     Hyperlipidemia Mother     Coronary Artery Disease Mother         MI-first at age 63    Other - See Comments Mother         cerebrovascular disease     Alcohol/Drug Father     Alzheimer Disease Father     Dementia Father     Hypertension Father     Hyperlipidemia Father     Substance Abuse Father     Diabetes Sister     Hypertension Sister     Hyperlipidemia Sister     Substance Abuse Sister     Asthma Sister     C.A.D. Sister 52        Minor MI- age 50's    Heart Disease Sister     Hypertension Sister     Hypertension Sister     Cancer - colorectal Sister 48        Late 40's early 50's    Gastrointestinal Disease Sister         Diverticulitis    Lipids Sister     Lipids Sister     Diabetes Sister     Heart Disease Sister         CHF    Cancer Sister         lung, smoker    Substance Abuse Sister     Asthma Sister     Cancer Sister     Coronary Artery Disease Sister         minor MI-age 50's    Heart Disease Sister     Hypertension Sister     Hypertension Sister     Colon Cancer Sister     Diverticulitis Sister     Lung Cancer Sister     Heart Disease Sister         CHF    Diabetes Sister     Asthma Sister     Hypertension Brother     Prostate Cancer Brother 74        Dx'd age 74    Gastrointestinal Disease Brother         Diverticulitis    Parkinsonism Brother     Substance Abuse Brother     Prostate Cancer Brother     Hyperlipidemia Brother     Hypertension Brother     Prostate Cancer Brother     Diverticulitis Brother     Parkinsonism Brother     Breast Cancer Daughter     Breast Cancer Daughter     Diabetes Other     Hypertension Other     Anesthesia Reaction No family hx of     Deep Vein Thrombosis (DVT) No family hx of         SOCIAL HISTORY:     Social History     Socioeconomic History    Marital status: Single    Number of children: 0    Years of education: Ed Spec De   Occupational History    Occupation: Professor     Employer: [employer]     Comment: [employer]- [employer]   Tobacco Use    Smoking status: Former Smoker     Packs/day: 0.50     Years: 10.00     Pack years: 5.00     Types: Cigarettes     Quit date: 8/1/2011     Years since quitting: 10.9    Smokeless tobacco: Never Used    Tobacco comment: 1/2 ppd   Substance and Sexual Activity    Alcohol use: No     Alcohol/week: 0.0 standard drinks     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    Drug use: No    Sexual activity: Never   Other Topics Concern    Parent/sibling w/ CABG, MI or angioplasty before 65F 55M? Yes   Social History Narrative                    REVIEW OF SYSTEMS:     Constitutional: No fevers or chills  Skin: No new rash or itching  Eyes: No acute change in vision  Ears/Nose/Throat: No purulent rhinorrhea, new hearing loss, or new vertigo  Respiratory: No cough or hemoptysis  Cardiovascular: See HPI  Gastrointestinal: No change in appetite, vomiting, hematemesis or diarrhea  Genitourinary: No dysuria or hematuria  Musculoskeletal: No new back pain, neck pain or muscle pain  Neurologic: No new headaches, focal weakness or behavior changes  Psychiatric: No hallucinations, excessive alcohol consumption or illegal drug usage  Hematologic/Lymphatic/Immunologic: No bleeding, chills, fever, night sweats or weight loss  Endocrine: No new cold intolerance, heat intolerance, polyphagia, polydipsia or polyuria      PHYSICAL EXAMINATION:     BP (!) 144/59 (BP Location: Right arm, Patient Position: Chair, Cuff Size: Adult Regular)   Pulse 59   Ht 1.676 m (5' 5.98\")   Wt 85.3 kg (188 lb)   SpO2 98%   BMI 30.36 kg/m      GENERAL: No acute distress.  HEENT: EOMI. Sclerae white, not injected. Nares clear. Pharynx without erythema or exudate.   Neck: No adenopathy. No thyromegaly. Symmetrical.   Heart: irregular irregular rate. No murmur.   Lungs: Clear to auscultation. No ronchi, wheezes, rales.   Abdomen: Soft, nontender, nondistended. Bowel sounds " present.  Extremities: No clubbing, cyanosis, or edema.   Neurologic: Alert and oriented to person/place/time, normal speech and affect. No focal deficits.  Skin: No petechiae, purpura or rash.     LABORATORY DATA:     LIPID RESULTS:  Lab Results   Component Value Date    CHOL 104 12/09/2021    CHOL 115 10/20/2020    HDL 59 12/09/2021    HDL 71 10/20/2020    LDL 16 12/09/2021    LDL 19 10/20/2020    TRIG 145 12/09/2021    TRIG 125 10/20/2020    CHOLHDLRATIO 2.5 08/06/2014       LIVER ENZYME RESULTS:  Lab Results   Component Value Date    AST 18 07/08/2022    AST 17 06/04/2021    ALT 10 07/08/2022    ALT 25 06/04/2021       CBC RESULTS:  Lab Results   Component Value Date    WBC 9.6 07/08/2022    WBC 8.6 06/04/2021    RBC 3.85 07/08/2022    RBC 4.46 06/04/2021    HGB 11.1 (L) 07/08/2022    HGB 13.4 06/04/2021    HCT 36.6 07/08/2022    HCT 42.1 06/04/2021    MCV 95 07/08/2022    MCV 94 06/04/2021    MCH 28.8 07/08/2022    MCH 30.0 06/04/2021    MCHC 30.3 (L) 07/08/2022    MCHC 31.8 06/04/2021    RDW 14.7 07/08/2022    RDW 15.5 (H) 06/04/2021     07/08/2022     06/04/2021       BMP RESULTS:  Lab Results   Component Value Date     05/25/2022     06/04/2021    POTASSIUM 4.2 05/25/2022    POTASSIUM 4.0 06/04/2021    CHLORIDE 108 05/25/2022    CHLORIDE 106 06/04/2021    CO2 24 05/25/2022    CO2 25 06/04/2021    ANIONGAP 9 05/25/2022    ANIONGAP 6 06/04/2021     (H) 07/19/2022     (H) 05/25/2022     (H) 06/04/2021    BUN 21 05/25/2022    BUN 14 06/04/2021    CR 1.10 (H) 07/08/2022    CR 1.11 (H) 06/04/2021    GFRESTIMATED 50 (L) 07/08/2022    GFRESTIMATED 47 (L) 06/04/2021    GFRESTBLACK 54 (L) 06/04/2021    CLAUDY 8.9 05/25/2022    CLAUDY 8.8 06/04/2021        A1C RESULTS:  Lab Results   Component Value Date    A1C 5.9 (H) 05/25/2022    A1C 7.1 (H) 06/04/2021       INR RESULTS:  Lab Results   Component Value Date    INR 1.36 (H) 03/03/2020    INR 2.37 (H) 02/14/2019           PROCEDURES & FURTHER ASSESSMENTS:     ECG on 2/11/22:      Echocardiogram  9/9/21  Interpretation Summary  Global and regional left ventricular function is normal with an EF of 55-60%.  The right ventricle is normal size. Global right ventricular function is  normal.  No significant valvular abnormalities.  IVC diameter >2.1 cm collapsing <50% with sniff suggests a high RA pressure  estimated at 15 mmHg or greater.  This study was compared with the study from 08/27/2020. The LA filling  pressures are higher. There are no significant changes in the biventricular  function or aortic calibers.    [id number] Score: 5 (age >75, HTN, CHF, gender) (7.2% annual stroke risk)  HASBLED Score: 4 (8.9% risk of bleeding)     CLINICAL IMPRESSION:     Betty Tee is a 82 year old female with atrial fibrillation who is a good candidate for left atrial appendage closure. Long term anticoagulation for atrial fibrillation is not a good option secondary to her GI bleeding issues.  The patient is suitable for short-term anticoagulation and is expected to tolerate a brief period of anticoagulation of at least 45 days following the procedure.   The risks and benefits of left atrial closure were discussed and the patient agrees to proceed with further evaluation and closure of their left atrial appendage if possible.      Plan Summary:  1) Left atrial appendage closure with the WATCHMAN device.  2) ZACHARY CT prior to size WATCHMAN    Patient was evaluated in clinic with Dr. Crews of interventional cardiology.    Aixa Yanez MD  Interventional Cardiology Fellow    CC  Patient Care Team:  Ilene Tristan MD as PCP - General  Ilene Tristan MD as Assigned PCP  Kirill Polk MD as MD (Otolaryngology)  Aubrey Hurtado MD as MD (Cardiology)  Anderson Perez MD as MD (Clinical Cardiac Electrophysiology)  Radha Rosa MD as MD (Cardiology)  Kiesha Elias APRN CNP as Nurse Practitioner  (Cardiology)  Beatriz Blanco, RN as Nurse Coordinator (Cardiology)  Carmenza Stringer MD as MD (Rheumatology)  Danay Payne, JASON as Specialty Care Coordinator (Cardiology)  Sabrina Meek RPH as Pharmacist (Pharmacist)  Flor Velasquez, RN as Specialty Care Coordinator (Cardiology)  Zulma Lopez MD as MD (Rheumatology)  Kami Lang MD as MD (Ophthalmology)  Reina Betancur MD as MD (Internal Medicine)  Zulma Lopez MD as Referring Physician (Rheumatology)  Zulma Lopez MD as Assigned Rheumatology Provider  Maryjane Tillman MD as Assigned Pulmonology Provider  Kelechi Santana MD as Assigned Surgical Provider  Sabrina Meek RPH as Assigned MT Pharmacist  Radha Rosa MD as Assigned Heart and Vascular Provider  JIMI VOGT    Patient seen and examined with Cardiovascular fellow and agree with the assessment and plan described above.     Uzair Crews M.D.  Interventional Cardiology  [facility]

## 2022-07-22 ENCOUNTER — RADIOLOGY INJECTION OFFICE VISIT (OUTPATIENT)
Dept: PALLIATIVE MEDICINE | Facility: CLINIC | Age: 82
End: 2022-07-22
Attending: PHYSICAL MEDICINE & REHABILITATION
Payer: MEDICARE

## 2022-07-22 VITALS — HEART RATE: 59 BPM | OXYGEN SATURATION: 96 % | DIASTOLIC BLOOD PRESSURE: 47 MMHG | SYSTOLIC BLOOD PRESSURE: 134 MMHG

## 2022-07-22 DIAGNOSIS — E11.9 TYPE 2 DIABETES MELLITUS WITHOUT COMPLICATION, WITHOUT LONG-TERM CURRENT USE OF INSULIN (H): ICD-10-CM

## 2022-07-22 DIAGNOSIS — M25.551 CHRONIC PAIN OF RIGHT HIP: ICD-10-CM

## 2022-07-22 DIAGNOSIS — M16.11 OSTEOARTHRITIS OF RIGHT HIP, UNSPECIFIED OSTEOARTHRITIS TYPE: Primary | ICD-10-CM

## 2022-07-22 DIAGNOSIS — G89.29 CHRONIC PAIN OF RIGHT HIP: ICD-10-CM

## 2022-07-22 PROCEDURE — 77002 NEEDLE LOCALIZATION BY XRAY: CPT | Performed by: PHYSICAL MEDICINE & REHABILITATION

## 2022-07-22 PROCEDURE — 20610 DRAIN/INJ JOINT/BURSA W/O US: CPT | Mod: RT | Performed by: PHYSICAL MEDICINE & REHABILITATION

## 2022-07-22 RX ORDER — PEN NEEDLE, DIABETIC 32GX 5/32"
NEEDLE, DISPOSABLE MISCELLANEOUS
Qty: 400 EACH | Refills: 1 | Status: SHIPPED | OUTPATIENT
Start: 2022-07-22 | End: 2023-02-23

## 2022-07-22 RX ORDER — TRIAMCINOLONE ACETONIDE 40 MG/ML
40 INJECTION, SUSPENSION INTRA-ARTICULAR; INTRAMUSCULAR ONCE
Status: COMPLETED | OUTPATIENT
Start: 2022-07-22 | End: 2022-07-22

## 2022-07-22 RX ADMIN — TRIAMCINOLONE ACETONIDE 40 MG: 40 INJECTION, SUSPENSION INTRA-ARTICULAR; INTRAMUSCULAR at 16:59

## 2022-07-22 ASSESSMENT — PAIN SCALES - GENERAL: PAINLEVEL: MODERATE PAIN (4)

## 2022-07-22 NOTE — NURSING NOTE
Discharge Information    IV Discontiued Time:  NA    Amount of Fluid Infused:  NA    Discharge Criteria = When patient returns to baseline or as per MD order    Consciousness:  Pt is fully awake    Circulation:  BP +/- 20% of pre-procedure level    Respiration:  Patient is able to breathe deeply    O2 Sat:  Patient is able to maintain O2 Sat >92% on room air    Activity:  Moves 4 extremities on command    Ambulation:  Patient is able to stand and walk or stand and pivot into wheelchair    Dressing:  Clean/dry or No Dressing    Notes:   Discharge instructions and AVS given to patient    Patient meets criteria for discharge?  YES    Admitted to PCU?  No    Responsible adult present to accompany patient home?  Yes    Signature/Title:    Claudia Sprague RN  RN Care Coordinator  [facility]

## 2022-07-22 NOTE — TELEPHONE ENCOUNTER
Prescription approved per [facility] Refill Protocol.  Sophia Hemphill RN

## 2022-07-22 NOTE — NURSING NOTE
Pre-procedure Intake  If YES to any questions or NO to having a   Please complete laminated checklist and leave on the computer keyboard for Provider, verbally inform provider if able.    For SCS Trial, RFA's or any sedation procedure:  Have you been fasting? NA    If yes, for how long?     Are you taking any any blood thinners such as Coumadin, Warfarin, Jantoven, Pradaxa Xarelto, Eliquis, Edoxaban, Enoxaparin, Lovenox, Heparin, Arixtra, Fondaparinux, or Fragmin? OR Antiplatelet medication such as Plavix, Brilinta, or Effient?   Yes -   Other blood thinners     If yes, when did you take your last dose? Has not started it    Do you take aspirin?  No    If cervical procedure, have you held aspirin for 6 days?   NA    Do you have any allergies to contrast dye, iodine, steroid and/or numbing medications?  NO    Are you currently taking antibiotics or have an active infection?  NO    Have you had a fever/elevated temperature within the past week? No    Are you currently taking oral steroids? YES: predniSONE (DELTASONE) 5 MG tablet    Do you have a ? Yes    Are you pregnant or breastfeeding?  NO    Have you received the COVID-19 vaccine? Yes    If yes, was it your 1st, 2nd or only dose needed? 2nd dose and boosters    Date of most recent vaccine: 06/06/2022    Notify provider and RNs if systolic BP >170, diastolic BP >100, P >100 or O2 sats < 90%    Dyan Cantrell MA  [facility] [place]

## 2022-07-22 NOTE — PATIENT INSTRUCTIONS
[facility] Procedure Discharge Instructions    Today you saw:   Dr. Alanna Alba       Your procedure:   Hip injection      Medications used:  Lidocaine (anesthetic)  Bupivacaine (anesthetic) Kenalog (steroid)  Omnipaque (contrast)            Be cautious when walking as numbness and/or weakness in the legs may occur up to 6-8 hours after the procedure due to effect of the local anesthetic  Do not drive for 6 hours. The effect of the local anesthetic could slow your reflexes.   Avoid strenuous activity for the first 24 hours. You may resume your regular activities after that.   You may shower, however avoid swimming, tub baths or hot tubs for 24 hours following your procedure  You may have a mild to moderate increase in pain for several days following the injection.    You may use ice packs for 10-15 minutes, 3 to 4 times a day at the injection site for comfort  Do not use heat to painful areas for 6 to 8 hours. This will give the local anesthetic time to wear off and prevent you from accidentally burning your skin.  Unless you have been directed to avoid the use of anti-inflammatory medications (NSAIDS-ibuprofen, Aleve, Motrin), you may use these medications or Tylenol for pain control if needed.   With diabetes, check your blood sugar more frequently than usual as your blood sugar may be higher than normal for 10-14 days following a steroid injection. Contact your doctor who manages your diabetes if your blood sugar is higher than usual  Possible side effects of steroids that you may experience include flushing, elevated blood pressure, increased appetite, mild headaches and restlessness.  All of these symptoms will get better with time.  It may take up to 14 days for the steroid medication to start working although you may feel the effect as early as a few days after the procedure.   Follow up with your referring provider in 2-3 weeks    If you experience any of the following, call the pain  center line during work hours at [phone] or on-call physician after hours at [phone]:  -Fever over 100 degree F  -Swelling, bleeding, redness, drainage, warmth at the injection site  -Progressive weakness or numbness in your legs   -Loss of bowel or bladder function  -Unusual headache that is not relieved by Tylenol or your regular headache medication  -Unusual new onset of pain that is not improving

## 2022-07-31 DIAGNOSIS — Z87.19 HISTORY OF LOWER GI BLEEDING: ICD-10-CM

## 2022-07-31 DIAGNOSIS — K21.9 GASTROESOPHAGEAL REFLUX DISEASE WITHOUT ESOPHAGITIS: ICD-10-CM

## 2022-08-01 ENCOUNTER — TELEPHONE (OUTPATIENT)
Dept: FAMILY MEDICINE | Facility: CLINIC | Age: 82
End: 2022-08-01

## 2022-08-01 DIAGNOSIS — K21.9 GASTROESOPHAGEAL REFLUX DISEASE WITHOUT ESOPHAGITIS: ICD-10-CM

## 2022-08-01 DIAGNOSIS — Z87.19 HISTORY OF LOWER GI BLEEDING: ICD-10-CM

## 2022-08-01 RX ORDER — PANTOPRAZOLE SODIUM 40 MG/1
40 TABLET, DELAYED RELEASE ORAL DAILY
Qty: 90 TABLET | Refills: 0 | Status: SHIPPED | OUTPATIENT
Start: 2022-08-01 | End: 2022-10-10

## 2022-08-01 RX ORDER — PANTOPRAZOLE SODIUM 40 MG/1
40 TABLET, DELAYED RELEASE ORAL DAILY
Qty: 90 TABLET | Refills: 0 | Status: CANCELLED | OUTPATIENT
Start: 2022-08-01

## 2022-08-01 NOTE — TELEPHONE ENCOUNTER
Called pt  Spoke with Nghia on C2C  Nothing further needed  Rx sent today for 40mg 1 pill daily   Lydia HALEY RN

## 2022-08-01 NOTE — TELEPHONE ENCOUNTER
Patient calling to request to prescription to state she takes 2 tables once daily.    Please call back to follow up

## 2022-08-01 NOTE — TELEPHONE ENCOUNTER
As (Dod),    Pt calling for refill.    Routing refill request to provider for review/approval because:  Drug interaction warning    Please authorize if appropriate.  Thanks,  Radha Otero RN

## 2022-08-02 DIAGNOSIS — M25.551 HIP PAIN, RIGHT: Primary | ICD-10-CM

## 2022-08-05 ENCOUNTER — LAB (OUTPATIENT)
Dept: LAB | Facility: CLINIC | Age: 82
End: 2022-08-05
Payer: MEDICARE

## 2022-08-05 ENCOUNTER — HOSPITAL ENCOUNTER (OUTPATIENT)
Dept: CT IMAGING | Facility: CLINIC | Age: 82
Discharge: HOME | End: 2022-08-05
Attending: INTERNAL MEDICINE
Payer: MEDICARE

## 2022-08-05 DIAGNOSIS — I48.21 PERMANENT ATRIAL FIBRILLATION (H): ICD-10-CM

## 2022-08-05 DIAGNOSIS — E55.9 VITAMIN D DEFICIENCY: ICD-10-CM

## 2022-08-05 DIAGNOSIS — R53.83 FATIGUE, UNSPECIFIED TYPE: ICD-10-CM

## 2022-08-05 DIAGNOSIS — Z79.4 TYPE 2 DIABETES MELLITUS WITH HYPERGLYCEMIA, WITH LONG-TERM CURRENT USE OF INSULIN (H): ICD-10-CM

## 2022-08-05 DIAGNOSIS — R79.89 ELEVATED PARATHYROID HORMONE: ICD-10-CM

## 2022-08-05 DIAGNOSIS — I10 ESSENTIAL HYPERTENSION WITH GOAL BLOOD PRESSURE LESS THAN 140/90: ICD-10-CM

## 2022-08-05 DIAGNOSIS — E11.65 TYPE 2 DIABETES MELLITUS WITH HYPERGLYCEMIA, WITH LONG-TERM CURRENT USE OF INSULIN (H): ICD-10-CM

## 2022-08-05 LAB
ALBUMIN SERPL BCG-MCNC: 4 G/DL (ref 3.5–5.2)
ALP SERPL-CCNC: 67 U/L (ref 35–104)
ALT SERPL W P-5'-P-CCNC: 13 U/L (ref 10–35)
ANION GAP SERPL CALCULATED.3IONS-SCNC: 13 MMOL/L (ref 7–15)
AST SERPL W P-5'-P-CCNC: 23 U/L (ref 10–35)
BILIRUB SERPL-MCNC: 0.3 MG/DL
BUN SERPL-MCNC: 16.8 MG/DL (ref 8–23)
CALCIUM SERPL-MCNC: 9.6 MG/DL (ref 8.8–10.2)
CHLORIDE SERPL-SCNC: 106 MMOL/L (ref 98–107)
CREAT SERPL-MCNC: 0.98 MG/DL (ref 0.51–0.95)
DEPRECATED HCO3 PLAS-SCNC: 22 MMOL/L (ref 22–29)
GFR SERPL CREATININE-BSD FRML MDRD: 57 ML/MIN/1.73M2
GLUCOSE SERPL-MCNC: 86 MG/DL (ref 70–99)
POTASSIUM SERPL-SCNC: 3.9 MMOL/L (ref 3.4–5.3)
PROT SERPL-MCNC: 6.7 G/DL (ref 6.4–8.3)
PTH-INTACT SERPL-MCNC: 33 PG/ML (ref 15–65)
SODIUM SERPL-SCNC: 141 MMOL/L (ref 136–145)

## 2022-08-05 PROCEDURE — 80053 COMPREHEN METABOLIC PANEL: CPT

## 2022-08-05 PROCEDURE — 250N000011 HC RX IP 250 OP 636: Performed by: INTERNAL MEDICINE

## 2022-08-05 PROCEDURE — 75572 CT HRT W/3D IMAGE: CPT | Mod: 26 | Performed by: INTERNAL MEDICINE

## 2022-08-05 PROCEDURE — G1010 CDSM STANSON: HCPCS | Mod: GC | Performed by: INTERNAL MEDICINE

## 2022-08-05 PROCEDURE — 83970 ASSAY OF PARATHORMONE: CPT

## 2022-08-05 PROCEDURE — 82306 VITAMIN D 25 HYDROXY: CPT

## 2022-08-05 PROCEDURE — G1010 CDSM STANSON: HCPCS

## 2022-08-05 PROCEDURE — 36415 COLL VENOUS BLD VENIPUNCTURE: CPT

## 2022-08-05 RX ORDER — IOPAMIDOL 755 MG/ML
120 INJECTION, SOLUTION INTRAVASCULAR ONCE
Status: COMPLETED | OUTPATIENT
Start: 2022-08-05 | End: 2022-08-05

## 2022-08-05 RX ADMIN — IOPAMIDOL 120 ML: 755 INJECTION, SOLUTION INTRAVENOUS at 14:16

## 2022-08-06 LAB — DEPRECATED CALCIDIOL+CALCIFEROL SERPL-MC: 56 UG/L (ref 20–75)

## 2022-08-11 ENCOUNTER — ANCILLARY PROCEDURE (OUTPATIENT)
Dept: GENERAL RADIOLOGY | Facility: CLINIC | Age: 82
End: 2022-08-11
Attending: ORTHOPAEDIC SURGERY
Payer: MEDICARE

## 2022-08-11 ENCOUNTER — OFFICE VISIT (OUTPATIENT)
Dept: ORTHOPEDICS | Facility: CLINIC | Age: 82
End: 2022-08-11

## 2022-08-11 DIAGNOSIS — M25.551 HIP PAIN, RIGHT: ICD-10-CM

## 2022-08-11 DIAGNOSIS — M25.551 RIGHT HIP PAIN: Primary | ICD-10-CM

## 2022-08-11 PROCEDURE — 99214 OFFICE O/P EST MOD 30 MIN: CPT | Performed by: ORTHOPAEDIC SURGERY

## 2022-08-11 PROCEDURE — 73502 X-RAY EXAM HIP UNI 2-3 VIEWS: CPT | Mod: RT | Performed by: RADIOLOGY

## 2022-08-11 ASSESSMENT — HOOS JR
LYING IN BED (TURNING OVER, MAINTAINING HIP POSITION): MILD
WALKING ON UNEVEN SURFACE: SEVERE
GOING UP OR DOWN STAIRS: MILD
HOOS JR TOTAL INTERVAL SCORE: 73.47

## 2022-08-11 NOTE — NURSING NOTE
"Reason For Visit:   Chief Complaint   Patient presents with     Consult     Right hip pain for 3 years. Has had PT and injections. Last was 7/23/22, helped a lot for the first week. \"Twisted something\" after the first week. Still having pain and taking oxycodone at night. Did 3-4 different PT sessions. Lateral hip pain that goes to lower leg. Groin pain is less.        There were no vitals taken for this visit.         Ling Bethea, ATC    "

## 2022-08-11 NOTE — PROGRESS NOTES
"Assessment: Sister Betty is a 82 year old with advanced right hip osteoarthritis associated with several years with severe pain limiting her ADLs despite long-term, non-operative management. We discussed the diagnosis and the treatment options including living with it and total hip. We spent twenty minutes discussing total hip arthroplasty.  We discussed the implants, the procedure, the risks and benefits, and the post-operative course.  We discussed blood clots, blood clots to the lungs, injury to blood vessels and nerves, dislocation, infection, and leg length difference.  All the patients questions were answered to the best of my ability.     Plan:  She is going to have her heart procedure and then RTC at which time we can likely go forward with SRI. Happy to see back sooner     Chief Complaint: Consult (Right hip pain for 3 years. Has had PT and injections. Last was 7/23/22, helped a lot for the first week. \"Twisted something\" after the first week. Still having pain and taking oxycodone at night. Did 3-4 different PT sessions. Lateral hip pain that goes to lower leg. Groin pain is less. )    Physician:  Zulma Lopez    HPI: Betty Tee is a 82 year old female who presents today for evaluation o    Symptom Profile  Location of symptoms:  Groin  Onset: insidious  Trend: getting worse   Duration of symptoms:> 5 years   Quality of symptoms: aching, sharp/stabbing  Severity: severe  Alleviate:activity modifidcation   Exacerbating: activities   Previous Treatments: Previous treatments include activity modification, oral pain medication, walker     YANNA Jr: 73.47    MEDICAL HISTORY:   Past Medical History:   Diagnosis Date     Alcohol abuse, in remission      Allergic rhinitis, cause unspecified     allegra helps when she takes it     Antiplatelet or antithrombotic long-term use      Atrial fibrillation (H)     in hosp in 11/11 after surgery w/ fluid overload     Cardiomegaly     LVH on stress echo- cardiac " w/u at [facility]- neg CT scan for PE, neg stress echo in 8/06     Chest pain, unspecified      Congestive heart failure (H)      Depressive disorder      Diabetes (H)      Disorder of bone and cartilage, unspecified     osteopenia (had been on prempro), improved on 6/06 dexa, stable dexa 11/10     Diverticulosis of colon (without mention of hemorrhage)     last episode yrs ago     Essential hypertension, benign      Follicular bronchiolitis (H)     associated with Sjogrens, dx by chest CT showing mosaic attenuation and air trapping     Gastro-oesophageal reflux disease      ILD (interstitial lung disease) (H)     associated with Sjogrens, also has mildly elevated IgG4, first noted on chest CT 2015 (mild changes) and also has small airways disease; ILD improved on follow up chest CT 2018.     Insomnia, unspecified     weaned off clonazepam     Irregular heart beat      Lumbago 7/09    MRI with NEAL, now seeing Dr. Cain for sciatic sx's     Major depressive disorder, recurrent episode, moderate (H)      Obstructive sleep apnea     uses cpap     Osteoarthrosis, unspecified whether generalized or localized, unspecified site      Sjogren's syndrome (H)     + RG and SSA and lip bx     Sleep apnea      Tobacco use disorder     chantix in 9/07, started again in 6/08, working       Medications:     Current Outpatient Medications:      ACCU-CHEK SHANNON PLUS test strip, USE TO TEST BLOOD SUGARS 3 TIMES DAILY, Disp: 300 strip, Rfl: 5     acetaminophen (TYLENOL) 500 MG tablet, Take 1,000 mg by mouth every 8 hours as needed (max 6 tablets/24 hours, 2 tablets/dose) , Disp: , Rfl:      acyclovir (ZOVIRAX) 400 MG tablet, Take 1 tablet (400 mg) by mouth 3 times daily For a couple days, Disp: 15 tablet, Rfl: 2     acyclovir (ZOVIRAX) 5 % external ointment, Apply topically 6 times daily As needed for outbreaks, Disp: 15 g, Rfl: 3     albuterol (PROAIR HFA/PROVENTIL HFA/VENTOLIN HFA) 108 (90 Base) MCG/ACT inhaler, Inhale 2 puffs into the  lungs every 6 hours, Disp: 18 g, Rfl: 11     alendronate (FOSAMAX) 70 MG tablet, Take 1 tablet (70 mg) by mouth every 7 days, Disp: 12 tablet, Rfl: 0     anakinra (KINERET) 100 MG/0.67ML SOSY injection, 100 mg every day x 3 days as needed for flare ups, Disp: 6.7 mL, Rfl: 3     atorvastatin (LIPITOR) 10 MG tablet, TAKE 1/2 TABLET BY MOUTH EVERY DAY, Disp: 45 tablet, Rfl: 2     azithromycin (ZITHROMAX) 250 MG tablet, TAKE 1 TABLET BY MOUTH EVERY DAY, Disp: 30 tablet, Rfl: 5     BD PEN NEEDLE JANE 2ND GEN 32G X 4 MM miscellaneous, USE 4 DAILY AS DIRECTED., Disp: 400 each, Rfl: 1     blood glucose (NO BRAND SPECIFIED) lancets standard, Use to test blood sugar 3 times daily or as directed, Disp: 100 lancet, Rfl: 3     cevimeline (EVOXAC) 30 MG capsule, Take 1 capsule (30 mg) by mouth 3 times daily (Patient taking differently: Take 30 mg by mouth every other day), Disp: 270 capsule, Rfl: 2     Cholecalciferol (VITAMIN D3) 50 MCG (2000 UT) CAPS, Take 100 mcg by mouth daily (Take 2 tablet (50 mcg) by mouth daily - Oral), Disp: 90 capsule, Rfl: 1     COMPOUNDED NON-CONTROLLED SUBSTANCE (CMPD RX) - PHARMACY TO MIX COMPOUNDED MEDICATION, Estriol 1 mg/g Apply small amount to finger and apply to inside vagina daily for 2 weeks then twice weekly Route: vaginally Dispense 30 grams 11 refills (Patient taking differently: Estriol 1 mg/g Apply small amount to finger and apply to inside vagina twice weekly Route: vaginally Dispense 30 grams 11 refills), Disp: 30 g, Rfl: 11     cyanocobalamin (VITAMIN B-12) 1000 MCG tablet, Take 1 tablet (1,000 mcg) by mouth daily, Disp: 30 tablet, Rfl: 1     escitalopram (LEXAPRO) 20 MG tablet, TAKE 1 TABLET BY MOUTH EVERY DAY, Disp: 90 tablet, Rfl: 0     ferrous sulfate (FEROSUL) 325 (65 Fe) MG tablet, TAKE 1 TABLET BY MOUTH EVERY DAY WITH BREAKFAST, Disp: 90 tablet, Rfl: 0     fluticasone (FLONASE) 50 MCG/ACT nasal spray, SPRAY 1-2 SPRAYS INTO BOTH NOSTRILS DAILY AS NEEDED FOR ALLERGIES, Disp: 16  mL, Rfl: 11     fluticasone-vilanterol (BREO ELLIPTA) 100-25 MCG/INH inhaler, Inhale 1 puff into the lungs daily, Disp: 1 each, Rfl: 11     HYDROcodone-acetaminophen (NORCO) 7.5-325 MG per tablet, Take 1 tablet by mouth every 12 hours OK to fill and start 06/28/22, Disp: 60 tablet, Rfl: 0     hydroxychloroquine (PLAQUENIL) 200 MG tablet, Take 2 tablets (400 mg) by mouth daily Get annual eye exams for hydroxychloroquine (Plaquenil) monitoring and fax to [phone], Disp: 180 tablet, Rfl: 3     insulin glargine (BASAGLAR KWIKPEN) 100 UNIT/ML pen, INJECT 22 UNITS SUBCUTANEOUS DAILY (TO REPLACE LANTUS), Disp: 15 mL, Rfl: 0     ketoconazole (NIZORAL) 2 % external cream, Apply topically 2 times daily as needed for itching, Disp: 60 g, Rfl: 1     KLOR-CON 20 MEQ CR tablet, TAKE 2 TABLETS (40 MEQ) BY MOUTH EVERY MORNING AND 1 TABLET (20 MEQ) EVERY EVENING., Disp: 270 tablet, Rfl: 3     lidocaine (LIDODERM) 5 % patch, APPLY PATCH TO PAINFUL AREA FOR UP TO 12 H WITHIN A 24 H PERIOD. REMOVE AFTER 12 HOURS., Disp: 30 patch, Rfl: 2     loratadine (CLARITIN) 10 MG tablet, TAKE 1 TABLET BY MOUTH EVERY DAY, Disp: 90 tablet, Rfl: 3     methocarbamol (ROBAXIN) 750 MG tablet, Take 1 tablet (750 mg) by mouth 3 times daily as needed for muscle spasms, Disp: 90 tablet, Rfl: 3     metoprolol succinate ER (TOPROL-XL) 50 MG 24 hr tablet, Take 1 tablet (50 mg) by mouth 2 times daily, Disp: 90 tablet, Rfl: 3     montelukast (SINGULAIR) 10 MG tablet, TAKE 1 TABLET BY MOUTH EVERYDAY AT BEDTIME, Disp: 90 tablet, Rfl: 0     NOVOLOG FLEXPEN 100 UNIT/ML soln, INJECT 12 UNITS SUBCUTANEOUS 3 TIMES DAILY (WITH MEALS), Disp: 15 mL, Rfl: 1     OZEMPIC, 1 MG/DOSE, 4 MG/3ML SOPN, INJECT 1 MG SUBCUTANEOUS ONCE A WEEK, Disp: 3 mL, Rfl: 2     pantoprazole (PROTONIX) 40 MG EC tablet, TAKE 1 TABLET (40 MG) BY MOUTH DAILY (REPLACES FAMOTIDINE- SHOULD STOP TAKING FAMOTIDINE), Disp: 90 tablet, Rfl: 0     predniSONE (DELTASONE) 5 MG tablet, Take 1 tablet (5 mg)  by mouth daily, Disp: 30 tablet, Rfl: 0     rivaroxaban ANTICOAGULANT (XARELTO ANTICOAGULANT) 20 MG TABS tablet, TAKE 1 TABLET BY MOUTH DAILY WITH DINNER (Patient not taking: Reported on 7/21/2022), Disp: 90 tablet, Rfl: 3     spironolactone (ALDACTONE) 25 MG tablet, Take 2 tablets (50 mg) by mouth daily, Disp: 180 tablet, Rfl: 3     torsemide (DEMADEX) 10 MG tablet, TAKE ONE TAB (10 MG) WITH ONE 20 MG TAB TO = 30 MG DAILY IN AFTERNOON., Disp: 90 tablet, Rfl: 3     torsemide (DEMADEX) 20 MG tablet, TAKE 2 TABLETS (40 MG) BY MOUTH EVERY MORNING AND 1 TABLET (20 MG) DAILY AT 2 PM. TAKE THE AFTERNOON DOSE WITH AN EXTRA 10 MG TAB FOR A TOTAL OF 30 MG IN THE AFTERNOON, Disp: 270 tablet, Rfl: 3     traZODone (DESYREL) 50 MG tablet, Take 3 tablets (150 mg) by mouth nightly as needed for sleep, Disp: 135 tablet, Rfl: 1    Allergies: Amoxicillin-pot clavulanate, Augmentin, Codeine, Codeine, Penicillins, Phenobarbital, Phenobarbital, and Seasonal allergies    SURGICAL HISTORY:   Past Surgical History:   Procedure Laterality Date     APPENDECTOMY       BACK SURGERY  1962     BACK SURGERY  1962     BIOPSY BREAST  09/27/2002    Biopsy Left Breast     BIOPSY BREAST Left 09/27/2002     BREAST SURGERY       CARDIAC SURGERY       CARDIAC SURGERY       CHOLECYSTECTOMY  1990's?     CHOLECYSTECTOMY       COLECTOMY LEFT  11/07/2011    Procedure:COLECTOMY LEFT; Laparoscopic mobilization of splenic flexture, sigmoid colectomy, coloprotoscopy, loop illeostomy; Surgeon:CK CASTANEDA; Location: OR     COLECTOMY LEFT  11/07/2011     COLONOSCOPY  1990,s     COLONOSCOPY N/A 5/3/2022    Procedure: COLONOSCOPY;  Surgeon: Guru Winsome Dias MD;  Location:  GI     ENT SURGERY       EYE SURGERY  2012     FLEXIBLE SIGMOIDOSCOPY  11/03/2011     HYSTERECTOMY TOTAL ABDOMINAL, BILATERAL SALPINGO-OOPHORECTOMY, COMBINED  11/07/2011    Procedure:COMBINED HYSTERECTOMY TOTAL ABDOMINAL, BILATERAL SALPINGO-OOPHORECTOMY; total abdominal  hysterectomy, bilateral salpingo-oophorectomy; Surgeon:ALETA MANUEL; Location:[facility] OR     HYSTERECTOMY TOTAL ABDOMINAL, BILATERAL SALPINGO-OOPHORECTOMY, COMBINED  11/07/2011     ILEOSTOMY  02/01/2012    takedown loop ileostomy      INSERT STENT URETER  11/07/2011    Procedure:INSERT STENT URETER; Placement of Bilateral Ureteral Stents ; Surgeon:PRANEETH BRYANT; Location:[facility] OR     SIGMOIDECTOMY  02/01/2012    Dr. Siddiqi, Sigmoidectomy for diverticular abscess, iliostomy afterwards until repair     SIGMOIDOSCOPY FLEXIBLE  11/03/2011    Procedure:SIGMOIDOSCOPY FLEXIBLE; Flexible Sigmoidoscopy; Surgeon:CK SIDDIQI; Location:[facility] OR     TAKEDOWN ILEOSTOMY  02/01/2012    Procedure:TAKEDOWN ILEOSTOMY; Takedown Loop Ileostomy ; Surgeon:CK SIDDIQI; Location:[facility] OR     URETERAL STENT PLACEMENT  11/07/2011     ZZC APPENDECTOMY  1970's?     ZZC NONSPECIFIC PROCEDURE  11/2005    exploratory abd lap, adhesions, resolved RLQ pain, diverticulitis episodes       FAMILY HISTORY:   Family History   Problem Relation Age of Onset     C.A.D. Mother 63        MI- first at age 63     Heart Disease Mother      Hypertension Mother      Cerebrovascular Disease Mother      Hyperlipidemia Mother      Coronary Artery Disease Mother         MI-first at age 63     Other - See Comments Mother         cerebrovascular disease      Alcohol/Drug Father      Alzheimer Disease Father      Dementia Father      Hypertension Father      Hyperlipidemia Father      Substance Abuse Father      Diabetes Sister      Hypertension Sister      Hyperlipidemia Sister      Substance Abuse Sister      Asthma Sister      C.A.D. Sister 52        Minor MI- age 50's     Heart Disease Sister      Hypertension Sister      Hypertension Sister      Cancer - colorectal Sister 48        Late 40's early 50's     Gastrointestinal Disease Sister         Diverticulitis     Lipids Sister      Lipids Sister      Diabetes Sister      Heart Disease Sister         CHF      Cancer Sister         lung, smoker     Substance Abuse Sister      Asthma Sister      Cancer Sister      Coronary Artery Disease Sister         minor MI-age 50's     Heart Disease Sister      Hypertension Sister      Hypertension Sister      Colon Cancer Sister      Diverticulitis Sister      Lung Cancer Sister      Heart Disease Sister         CHF     Diabetes Sister      Asthma Sister      Hypertension Brother      Prostate Cancer Brother 74        Dx'd age 74     Gastrointestinal Disease Brother         Diverticulitis     Parkinsonism Brother      Substance Abuse Brother      Prostate Cancer Brother      Hyperlipidemia Brother      Hypertension Brother      Prostate Cancer Brother      Diverticulitis Brother      Parkinsonism Brother      Breast Cancer Daughter      Breast Cancer Daughter      Diabetes Other      Hypertension Other      Anesthesia Reaction No family hx of      Deep Vein Thrombosis (DVT) No family hx of        SOCIAL HISTORY:   Social History     Tobacco Use     Smoking status: Former Smoker     Packs/day: 0.50     Years: 10.00     Pack years: 5.00     Types: Cigarettes     Quit date: 2011     Years since quittin.0     Smokeless tobacco: Never Used     Tobacco comment:  ppd   Substance Use Topics     Alcohol use: No     Alcohol/week: 0.0 standard drinks     Comment: In recovery beginning    Alee, here with Sister Nghia, Board of Directors at [employer]. .     REVIEW OF SYSTEMS:  The comprehensive review of systems from the intake form was reviewed with the patient.  No fever, weight change or fatigue. No dry eyes. No oral ulcers, sore throat or voice change. No palpitations, syncope, angina or edema.  No chest pain, excessive sleepiness, shortness of breath or hemoptysis.   No abdominal pain, nausea, vomiting, diarrhea or heartburn.  No skin rash. No focal weakness or numbness. No bleeding or lymphadenopathy. No rhinitis or hives.     Exam:  On physical examination the  patient appears the stated age, is in no acute distress, affectThe is appropriate, and breathing is non-labored.  Vitals are documented in the EMR and have been reviewed:    There were no vitals taken for this visit.  Data Unavailable  There is no height or weight on file to calculate BMI.    Rises from chair: with effort   Gait:slow and antalgic with a walker.   Trendelenburg test:  Gains the exam table: easily     RIGHT hip subjective: a little irritated  Abd: 20  Add:10  PFC:  Flexion: 80  IRF:-10  ERF:20  Impingement test: +    LEFT hip subjective: not irritated   Abd:  Add:  PFC:  Flexion: 95  IRF:5  ERF:30  Impingement test:    Distally, the circulatory, motor, and sensation exam is intact with 5/5 EHL, gastroc-soleus, and tibialis anterior.  Sensation to light touch is intact.  Dorsalis pedis and posterior tibialis pulses are palpable.  There are no sores on the feet, no bruising, and no lymphedema.    X-rays:   Advanced right hip osteoarthritis with bone loss

## 2022-08-11 NOTE — LETTER
"    8/11/2022    RE: Betty Tee  [address]  [facility] [address]    Dear Colleague,    Thank you for referring your patient, Betty Tee, to the [facility] [place]. Please see a copy of my visit note below.    Assessment: Sister Betty is a 82 year old with advanced right hip osteoarthritis associated with several years with severe pain limiting her ADLs despite long-term, non-operative management. We discussed the diagnosis and the treatment options including living with it and total hip. We spent twenty minutes discussing total hip arthroplasty.  We discussed the implants, the procedure, the risks and benefits, and the post-operative course.  We discussed blood clots, blood clots to the lungs, injury to blood vessels and nerves, dislocation, infection, and leg length difference.  All the patients questions were answered to the best of my ability.     Plan:  She is going to have her heart procedure and then RTC at which time we can likely go forward with SRI. Happy to see back sooner     Chief Complaint: Consult (Right hip pain for 3 years. Has had PT and injections. Last was 7/23/22, helped a lot for the first week. \"Twisted something\" after the first week. Still having pain and taking oxycodone at night. Did 3-4 different PT sessions. Lateral hip pain that goes to lower leg. Groin pain is less. )    Physician:  Zulma Lopez    HPI: Betty Tee is a 82 year old female who presents today for evaluation o    Symptom Profile  Location of symptoms:  Groin  Onset: insidious  Trend: getting worse   Duration of symptoms:> 5 years   Quality of symptoms: aching, sharp/stabbing  Severity: severe  Alleviate:activity modifidcation   Exacerbating: activities   Previous Treatments: Previous treatments include activity modification, oral pain medication, walker     YANNA Jr: 73.47    MEDICAL HISTORY:   Past Medical History:   Diagnosis Date     Alcohol abuse, in " remission      Allergic rhinitis, cause unspecified     allegra helps when she takes it     Antiplatelet or antithrombotic long-term use      Atrial fibrillation (H)     in hosp in 11/11 after surgery w/ fluid overload     Cardiomegaly     LVH on stress echo- cardiac w/u at [facility]- neg CT scan for PE, neg stress echo in 8/06     Chest pain, unspecified      Congestive heart failure (H)      Depressive disorder      Diabetes (H)      Disorder of bone and cartilage, unspecified     osteopenia (had been on prempro), improved on 6/06 dexa, stable dexa 11/10     Diverticulosis of colon (without mention of hemorrhage)     last episode yrs ago     Essential hypertension, benign      Follicular bronchiolitis (H)     associated with Sjogrens, dx by chest CT showing mosaic attenuation and air trapping     Gastro-oesophageal reflux disease      ILD (interstitial lung disease) (H)     associated with Sjogrens, also has mildly elevated IgG4, first noted on chest CT 2015 (mild changes) and also has small airways disease; ILD improved on follow up chest CT 2018.     Insomnia, unspecified     weaned off clonazepam     Irregular heart beat      Lumbago 7/09    MRI with DJD, now seeing Dr. Cain for sciatic sx's     Major depressive disorder, recurrent episode, moderate (H)      Obstructive sleep apnea     uses cpap     Osteoarthrosis, unspecified whether generalized or localized, unspecified site      Sjogren's syndrome (H)     + RG and SSA and lip bx     Sleep apnea      Tobacco use disorder     chantix in 9/07, started again in 6/08, working       Medications:     Current Outpatient Medications:      ACCU-CHEK SHANNON PLUS test strip, USE TO TEST BLOOD SUGARS 3 TIMES DAILY, Disp: 300 strip, Rfl: 5     acetaminophen (TYLENOL) 500 MG tablet, Take 1,000 mg by mouth every 8 hours as needed (max 6 tablets/24 hours, 2 tablets/dose) , Disp: , Rfl:      acyclovir (ZOVIRAX) 400 MG tablet, Take 1 tablet (400 mg) by mouth 3 times daily For  a couple days, Disp: 15 tablet, Rfl: 2     acyclovir (ZOVIRAX) 5 % external ointment, Apply topically 6 times daily As needed for outbreaks, Disp: 15 g, Rfl: 3     albuterol (PROAIR HFA/PROVENTIL HFA/VENTOLIN HFA) 108 (90 Base) MCG/ACT inhaler, Inhale 2 puffs into the lungs every 6 hours, Disp: 18 g, Rfl: 11     alendronate (FOSAMAX) 70 MG tablet, Take 1 tablet (70 mg) by mouth every 7 days, Disp: 12 tablet, Rfl: 0     anakinra (KINERET) 100 MG/0.67ML SOSY injection, 100 mg every day x 3 days as needed for flare ups, Disp: 6.7 mL, Rfl: 3     atorvastatin (LIPITOR) 10 MG tablet, TAKE 1/2 TABLET BY MOUTH EVERY DAY, Disp: 45 tablet, Rfl: 2     azithromycin (ZITHROMAX) 250 MG tablet, TAKE 1 TABLET BY MOUTH EVERY DAY, Disp: 30 tablet, Rfl: 5     BD PEN NEEDLE JANE 2ND GEN 32G X 4 MM miscellaneous, USE 4 DAILY AS DIRECTED., Disp: 400 each, Rfl: 1     blood glucose (NO BRAND SPECIFIED) lancets standard, Use to test blood sugar 3 times daily or as directed, Disp: 100 lancet, Rfl: 3     cevimeline (EVOXAC) 30 MG capsule, Take 1 capsule (30 mg) by mouth 3 times daily (Patient taking differently: Take 30 mg by mouth every other day), Disp: 270 capsule, Rfl: 2     Cholecalciferol (VITAMIN D3) 50 MCG (2000 UT) CAPS, Take 100 mcg by mouth daily (Take 2 tablet (50 mcg) by mouth daily - Oral), Disp: 90 capsule, Rfl: 1     COMPOUNDED NON-CONTROLLED SUBSTANCE (CMPD RX) - PHARMACY TO MIX COMPOUNDED MEDICATION, Estriol 1 mg/g Apply small amount to finger and apply to inside vagina daily for 2 weeks then twice weekly Route: vaginally Dispense 30 grams 11 refills (Patient taking differently: Estriol 1 mg/g Apply small amount to finger and apply to inside vagina twice weekly Route: vaginally Dispense 30 grams 11 refills), Disp: 30 g, Rfl: 11     cyanocobalamin (VITAMIN B-12) 1000 MCG tablet, Take 1 tablet (1,000 mcg) by mouth daily, Disp: 30 tablet, Rfl: 1     escitalopram (LEXAPRO) 20 MG tablet, TAKE 1 TABLET BY MOUTH EVERY DAY, Disp: 90  tablet, Rfl: 0     ferrous sulfate (FEROSUL) 325 (65 Fe) MG tablet, TAKE 1 TABLET BY MOUTH EVERY DAY WITH BREAKFAST, Disp: 90 tablet, Rfl: 0     fluticasone (FLONASE) 50 MCG/ACT nasal spray, SPRAY 1-2 SPRAYS INTO BOTH NOSTRILS DAILY AS NEEDED FOR ALLERGIES, Disp: 16 mL, Rfl: 11     fluticasone-vilanterol (BREO ELLIPTA) 100-25 MCG/INH inhaler, Inhale 1 puff into the lungs daily, Disp: 1 each, Rfl: 11     HYDROcodone-acetaminophen (NORCO) 7.5-325 MG per tablet, Take 1 tablet by mouth every 12 hours OK to fill and start 06/28/22, Disp: 60 tablet, Rfl: 0     hydroxychloroquine (PLAQUENIL) 200 MG tablet, Take 2 tablets (400 mg) by mouth daily Get annual eye exams for hydroxychloroquine (Plaquenil) monitoring and fax to [phone], Disp: 180 tablet, Rfl: 3     insulin glargine (BASAGLAR KWIKPEN) 100 UNIT/ML pen, INJECT 22 UNITS SUBCUTANEOUS DAILY (TO REPLACE LANTUS), Disp: 15 mL, Rfl: 0     ketoconazole (NIZORAL) 2 % external cream, Apply topically 2 times daily as needed for itching, Disp: 60 g, Rfl: 1     KLOR-CON 20 MEQ CR tablet, TAKE 2 TABLETS (40 MEQ) BY MOUTH EVERY MORNING AND 1 TABLET (20 MEQ) EVERY EVENING., Disp: 270 tablet, Rfl: 3     lidocaine (LIDODERM) 5 % patch, APPLY PATCH TO PAINFUL AREA FOR UP TO 12 H WITHIN A 24 H PERIOD. REMOVE AFTER 12 HOURS., Disp: 30 patch, Rfl: 2     loratadine (CLARITIN) 10 MG tablet, TAKE 1 TABLET BY MOUTH EVERY DAY, Disp: 90 tablet, Rfl: 3     methocarbamol (ROBAXIN) 750 MG tablet, Take 1 tablet (750 mg) by mouth 3 times daily as needed for muscle spasms, Disp: 90 tablet, Rfl: 3     metoprolol succinate ER (TOPROL-XL) 50 MG 24 hr tablet, Take 1 tablet (50 mg) by mouth 2 times daily, Disp: 90 tablet, Rfl: 3     montelukast (SINGULAIR) 10 MG tablet, TAKE 1 TABLET BY MOUTH EVERYDAY AT BEDTIME, Disp: 90 tablet, Rfl: 0     NOVOLOG FLEXPEN 100 UNIT/ML soln, INJECT 12 UNITS SUBCUTANEOUS 3 TIMES DAILY (WITH MEALS), Disp: 15 mL, Rfl: 1     OZEMPIC, 1 MG/DOSE, 4 MG/3ML SOPN, INJECT 1 MG  SUBCUTANEOUS ONCE A WEEK, Disp: 3 mL, Rfl: 2     pantoprazole (PROTONIX) 40 MG EC tablet, TAKE 1 TABLET (40 MG) BY MOUTH DAILY (REPLACES FAMOTIDINE- SHOULD STOP TAKING FAMOTIDINE), Disp: 90 tablet, Rfl: 0     predniSONE (DELTASONE) 5 MG tablet, Take 1 tablet (5 mg) by mouth daily, Disp: 30 tablet, Rfl: 0     rivaroxaban ANTICOAGULANT (XARELTO ANTICOAGULANT) 20 MG TABS tablet, TAKE 1 TABLET BY MOUTH DAILY WITH DINNER (Patient not taking: Reported on 7/21/2022), Disp: 90 tablet, Rfl: 3     spironolactone (ALDACTONE) 25 MG tablet, Take 2 tablets (50 mg) by mouth daily, Disp: 180 tablet, Rfl: 3     torsemide (DEMADEX) 10 MG tablet, TAKE ONE TAB (10 MG) WITH ONE 20 MG TAB TO = 30 MG DAILY IN AFTERNOON., Disp: 90 tablet, Rfl: 3     torsemide (DEMADEX) 20 MG tablet, TAKE 2 TABLETS (40 MG) BY MOUTH EVERY MORNING AND 1 TABLET (20 MG) DAILY AT 2 PM. TAKE THE AFTERNOON DOSE WITH AN EXTRA 10 MG TAB FOR A TOTAL OF 30 MG IN THE AFTERNOON, Disp: 270 tablet, Rfl: 3     traZODone (DESYREL) 50 MG tablet, Take 3 tablets (150 mg) by mouth nightly as needed for sleep, Disp: 135 tablet, Rfl: 1    Allergies: Amoxicillin-pot clavulanate, Augmentin, Codeine, Codeine, Penicillins, Phenobarbital, Phenobarbital, and Seasonal allergies    SURGICAL HISTORY:   Past Surgical History:   Procedure Laterality Date     APPENDECTOMY       BACK SURGERY  1962     BACK SURGERY  1962     BIOPSY BREAST  09/27/2002    Biopsy Left Breast     BIOPSY BREAST Left 09/27/2002     BREAST SURGERY       CARDIAC SURGERY       CARDIAC SURGERY       CHOLECYSTECTOMY  1990's?     CHOLECYSTECTOMY       COLECTOMY LEFT  11/07/2011    Procedure:COLECTOMY LEFT; Laparoscopic mobilization of splenic flexture, sigmoid colectomy, coloprotoscopy, loop illeostomy; Surgeon:CK CASTANEDA; [facility]     COLECTOMY LEFT  11/07/2011     COLONOSCOPY  1990,s     COLONOSCOPY N/A 5/3/2022    Procedure: COLONOSCOPY;  Surgeon: Guru Winsome Dias MD;  Location:  GI      ENT SURGERY       EYE SURGERY  2012     FLEXIBLE SIGMOIDOSCOPY  11/03/2011     HYSTERECTOMY TOTAL ABDOMINAL, BILATERAL SALPINGO-OOPHORECTOMY, COMBINED  11/07/2011    Procedure:COMBINED HYSTERECTOMY TOTAL ABDOMINAL, BILATERAL SALPINGO-OOPHORECTOMY; total abdominal hysterectomy, bilateral salpingo-oophorectomy; Surgeon:ALETA MANUEL; Location:[facility] OR     HYSTERECTOMY TOTAL ABDOMINAL, BILATERAL SALPINGO-OOPHORECTOMY, COMBINED  11/07/2011     ILEOSTOMY  02/01/2012    takedown loop ileostomy      INSERT STENT URETER  11/07/2011    Procedure:INSERT STENT URETER; Placement of Bilateral Ureteral Stents ; Surgeon:PRANEETH BRYANT; Location:[facility] OR     SIGMOIDECTOMY  02/01/2012    Dr. Siddiqi, Sigmoidectomy for diverticular abscess, iliostomy afterwards until repair     SIGMOIDOSCOPY FLEXIBLE  11/03/2011    Procedure:SIGMOIDOSCOPY FLEXIBLE; Flexible Sigmoidoscopy; Surgeon:CK SIDDIQI; Location[facility] OR     TAKEDOWN ILEOSTOMY  02/01/2012    Procedure:TAKEDOWN ILEOSTOMY; Takedown Loop Ileostomy ; Surgeon:CK SIDDIQI; Location:[facility] OR     URETERAL STENT PLACEMENT  11/07/2011     ZZC APPENDECTOMY  1970's?     ZZC NONSPECIFIC PROCEDURE  11/2005    exploratory abd lap, adhesions, resolved RLQ pain, diverticulitis episodes       FAMILY HISTORY:   Family History   Problem Relation Age of Onset     C.A.D. Mother 63        MI- first at age 63     Heart Disease Mother      Hypertension Mother      Cerebrovascular Disease Mother      Hyperlipidemia Mother      Coronary Artery Disease Mother         MI-first at age 63     Other - See Comments Mother         cerebrovascular disease      Alcohol/Drug Father      Alzheimer Disease Father      Dementia Father      Hypertension Father      Hyperlipidemia Father      Substance Abuse Father      Diabetes Sister      Hypertension Sister      Hyperlipidemia Sister      Substance Abuse Sister      Asthma Sister      C.A.D. Sister 52        Minor MI- age 50's     Heart Disease Sister       Hypertension Sister      Hypertension Sister      Cancer - colorectal Sister 48        Late 40's early 50's     Gastrointestinal Disease Sister         Diverticulitis     Lipids Sister      Lipids Sister      Diabetes Sister      Heart Disease Sister         CHF     Cancer Sister         lung, smoker     Substance Abuse Sister      Asthma Sister      Cancer Sister      Coronary Artery Disease Sister         minor MI-age 50's     Heart Disease Sister      Hypertension Sister      Hypertension Sister      Colon Cancer Sister      Diverticulitis Sister      Lung Cancer Sister      Heart Disease Sister         CHF     Diabetes Sister      Asthma Sister      Hypertension Brother      Prostate Cancer Brother 74        Dx'd age 74     Gastrointestinal Disease Brother         Diverticulitis     Parkinsonism Brother      Substance Abuse Brother      Prostate Cancer Brother      Hyperlipidemia Brother      Hypertension Brother      Prostate Cancer Brother      Diverticulitis Brother      Parkinsonism Brother      Breast Cancer Daughter      Breast Cancer Daughter      Diabetes Other      Hypertension Other      Anesthesia Reaction No family hx of      Deep Vein Thrombosis (DVT) No family hx of        SOCIAL HISTORY:   Social History     Tobacco Use     Smoking status: Former Smoker     Packs/day: 0.50     Years: 10.00     Pack years: 5.00     Types: Cigarettes     Quit date: 2011     Years since quittin.0     Smokeless tobacco: Never Used     Tobacco comment:  ppd   Substance Use Topics     Alcohol use: No     Alcohol/week: 0.0 standard drinks     Comment: In recovery beginning    Alee, here with Sister Nghia, Board of [employer] at [employer]. .     REVIEW OF SYSTEMS:  The comprehensive review of systems from the intake form was reviewed with the patient.  No fever, weight change or fatigue. No dry eyes. No oral ulcers, sore throat or voice change. No palpitations, syncope, angina or edema.  No  chest pain, excessive sleepiness, shortness of breath or hemoptysis.   No abdominal pain, nausea, vomiting, diarrhea or heartburn.  No skin rash. No focal weakness or numbness. No bleeding or lymphadenopathy. No rhinitis or hives.     Exam:  On physical examination the patient appears the stated age, is in no acute distress, affectThe is appropriate, and breathing is non-labored.  Vitals are documented in the EMR and have been reviewed:    There were no vitals taken for this visit.  Data Unavailable  There is no height or weight on file to calculate BMI.    Rises from chair: with effort   Gait:slow and antalgic with a walker.   Trendelenburg test:  Gains the exam table: easily     RIGHT hip subjective: a little irritated  Abd: 20  Add:10  PFC:  Flexion: 80  IRF:-10  ERF:20  Impingement test: +    LEFT hip subjective: not irritated   Abd:  Add:  PFC:  Flexion: 95  IRF:5  ERF:30  Impingement test:    Distally, the circulatory, motor, and sensation exam is intact with 5/5 EHL, gastroc-soleus, and tibialis anterior.  Sensation to light touch is intact.  Dorsalis pedis and posterior tibialis pulses are palpable.  There are no sores on the feet, no bruising, and no lymphedema.    X-rays:   Advanced right hip osteoarthritis with bone loss     Thomas Shannon MD

## 2022-08-12 ENCOUNTER — CARE COORDINATION (OUTPATIENT)
Dept: CARDIOLOGY | Facility: CLINIC | Age: 82
End: 2022-08-12

## 2022-08-12 DIAGNOSIS — G89.29 OTHER CHRONIC PAIN: ICD-10-CM

## 2022-08-12 DIAGNOSIS — M16.11 OSTEOARTHRITIS OF RIGHT HIP, UNSPECIFIED OSTEOARTHRITIS TYPE: ICD-10-CM

## 2022-08-12 NOTE — TELEPHONE ENCOUNTER
Routing to provider to review medication prepped per below      Norco 7.5-325, #60, Refill:no  Sig:OK to fill and start 08/12/22  Last picked up 06/28/22 with start on 06/28/22  Due: Anytime    Per last OV note 07/06/22:    1. Medication Management:   1. Continue Norco 7.5-325mg BID prn.      Claudia LANDON, RN Care Coordinator  [facility]  Pain Management

## 2022-08-12 NOTE — TELEPHONE ENCOUNTER
Received call from patient requesting refill(s) of  HYDROcodone-acetaminophen (NORCO) 7.5-325 MG per tablet    Last dispensed from pharmacy on 06/28/22    Patient's last office/virtual visit by prescribing provider on 07/06/22  Next office/virtual appointment scheduled for 08/24/22    Last urine drug screen date 09/02/21  Current opioid agreement on file (completed within the last year) Yes Date of opioid agreement: 10/04/21    E-prescribe to pharmacy-[id number] - [place], [place] - [address]    Will route to [facility] for review and preparation of prescription(s).

## 2022-08-12 NOTE — PROGRESS NOTES
Left message to let them know that the testing looked good and that I was checking in to see if a procedure date of 9/12. Contact information left.

## 2022-08-12 NOTE — TELEPHONE ENCOUNTER
[facility] Call Center    Phone Message    May a detailed message be left on voicemail: yes     Reason for Call: Medication Refill Request    Has the patient contacted the pharmacy for the refill? Yes   Name of medication being requested: HYDROcodone-acetaminophen (NORCO) 7.5-325 MG per tablet  Provider who prescribed the medication: Dr Alba  Pharmacy: [facility]Y [id number] - [place], [place] - [address]  Date medication is needed: ASAP         Action Taken: Message routed to:  [facility] ([facility] Pain     Travel Screening: Not Applicable

## 2022-08-14 DIAGNOSIS — M85.80 OSTEOPENIA, UNSPECIFIED LOCATION: ICD-10-CM

## 2022-08-14 DIAGNOSIS — M10.9 ACUTE GOUT OF LEFT FOOT, UNSPECIFIED CAUSE: ICD-10-CM

## 2022-08-14 DIAGNOSIS — Z79.52 CURRENT CHRONIC USE OF SYSTEMIC STEROIDS: ICD-10-CM

## 2022-08-14 DIAGNOSIS — M06.00 SERONEGATIVE RHEUMATOID ARTHRITIS (H): ICD-10-CM

## 2022-08-16 ENCOUNTER — CARE COORDINATION (OUTPATIENT)
Dept: CARDIOLOGY | Facility: CLINIC | Age: 82
End: 2022-08-16

## 2022-08-16 RX ORDER — ALENDRONATE SODIUM 70 MG/1
70 TABLET ORAL
Qty: 12 TABLET | Refills: 0 | Status: SHIPPED | OUTPATIENT
Start: 2022-08-16 | End: 2022-12-01

## 2022-08-16 RX ORDER — HYDROCODONE BITARTRATE AND ACETAMINOPHEN 7.5; 325 MG/1; MG/1
1 TABLET ORAL EVERY 12 HOURS
Qty: 60 TABLET | Refills: 0 | Status: SHIPPED | OUTPATIENT
Start: 2022-08-16 | End: 2022-09-21 | Stop reason: ALTCHOICE

## 2022-08-16 RX ORDER — PREDNISONE 5 MG/1
5 TABLET ORAL DAILY
Qty: 90 TABLET | Refills: 1 | Status: SHIPPED | OUTPATIENT
Start: 2022-08-16 | End: 2023-02-10

## 2022-08-16 NOTE — TELEPHONE ENCOUNTER
Medication/Dose: Prednisone 5 mg    Last Written : 2/6/21  Last Quantity: 30, # refills: 0  Last Office Visit :  7/8/22  Pending appointment: 1/13/23    Prescription set up and routed to provider per Refill Protocol, this is a new order from last appointment.    Kirk Woods, NORMAN, RN

## 2022-08-16 NOTE — TELEPHONE ENCOUNTER
Medication/Dose: alendronate (FOSAMAX) 70 MG tablet    Last Written : 5/12/22  Last Quantity: 12, # refills: 0  Last Office Visit :  7/8/22  Pending appointment: 1/13/23    Creatinine   Date Value Ref Range Status   08/05/2022 0.98 (H) 0.51 - 0.95 mg/dL Final   06/04/2021 1.11 (H) 0.52 - 1.04 mg/dL Final     Last DEXA was 8/20/19.    Prescription set up and routed to provider per Refill Protocol, overdue for DEXA.    Kirk Woods, NORMAN, RN

## 2022-08-16 NOTE — PROGRESS NOTES
Telephone call made to Nghia, offering the date of Sept. 26, Monday as a Watchman procedure date. A message was left with this information as well as contact info. Will continue to follow up.

## 2022-08-16 NOTE — TELEPHONE ENCOUNTER
Signed Prescriptions:                        Disp   Refills    HYDROcodone-acetaminophen (NORCO) 7.5-325 *60 tab*0        Sig: Take 1 tablet by mouth every 12 hours OK to fill and           start 08/12/22  Authorizing Provider: OMARI JONES MD  [facility] Pain Management

## 2022-08-23 ENCOUNTER — OFFICE VISIT (OUTPATIENT)
Dept: FAMILY MEDICINE | Facility: CLINIC | Age: 82
End: 2022-08-23
Payer: MEDICARE

## 2022-08-23 VITALS
HEART RATE: 55 BPM | WEIGHT: 189.5 LBS | DIASTOLIC BLOOD PRESSURE: 60 MMHG | TEMPERATURE: 97.1 F | BODY MASS INDEX: 30.6 KG/M2 | OXYGEN SATURATION: 99 % | RESPIRATION RATE: 18 BRPM | SYSTOLIC BLOOD PRESSURE: 126 MMHG

## 2022-08-23 DIAGNOSIS — N18.32 STAGE 3B CHRONIC KIDNEY DISEASE (H): ICD-10-CM

## 2022-08-23 DIAGNOSIS — K76.0 FATTY LIVER DISEASE, NONALCOHOLIC: ICD-10-CM

## 2022-08-23 DIAGNOSIS — K31.819 GAVE (GASTRIC ANTRAL VASCULAR ECTASIA): ICD-10-CM

## 2022-08-23 DIAGNOSIS — F33.1 MAJOR DEPRESSIVE DISORDER, RECURRENT EPISODE, MODERATE (H): ICD-10-CM

## 2022-08-23 DIAGNOSIS — Z79.4 TYPE 2 DIABETES MELLITUS WITH HYPERGLYCEMIA, WITH LONG-TERM CURRENT USE OF INSULIN (H): Primary | ICD-10-CM

## 2022-08-23 DIAGNOSIS — I48.0 PAROXYSMAL ATRIAL FIBRILLATION (H): ICD-10-CM

## 2022-08-23 DIAGNOSIS — I10 ESSENTIAL HYPERTENSION WITH GOAL BLOOD PRESSURE LESS THAN 140/90: ICD-10-CM

## 2022-08-23 DIAGNOSIS — Z79.899 ENCOUNTER FOR LONG-TERM (CURRENT) USE OF MEDICATIONS: ICD-10-CM

## 2022-08-23 DIAGNOSIS — M25.551 HIP PAIN, RIGHT: ICD-10-CM

## 2022-08-23 DIAGNOSIS — E11.65 TYPE 2 DIABETES MELLITUS WITH HYPERGLYCEMIA, WITH LONG-TERM CURRENT USE OF INSULIN (H): Primary | ICD-10-CM

## 2022-08-23 DIAGNOSIS — G47.00 INSOMNIA, UNSPECIFIED TYPE: ICD-10-CM

## 2022-08-23 LAB
ERYTHROCYTE [DISTWIDTH] IN BLOOD BY AUTOMATED COUNT: 14.9 % (ref 10–15)
HBA1C MFR BLD: 6.4 % (ref 0–5.6)
HCT VFR BLD AUTO: 39 % (ref 35–47)
HGB BLD-MCNC: 12.3 G/DL (ref 11.7–15.7)
MCH RBC QN AUTO: 30.5 PG (ref 26.5–33)
MCHC RBC AUTO-ENTMCNC: 31.5 G/DL (ref 31.5–36.5)
MCV RBC AUTO: 97 FL (ref 78–100)
PLATELET # BLD AUTO: 207 10E3/UL (ref 150–450)
RBC # BLD AUTO: 4.03 10E6/UL (ref 3.8–5.2)
WBC # BLD AUTO: 7.6 10E3/UL (ref 4–11)

## 2022-08-23 PROCEDURE — 36415 COLL VENOUS BLD VENIPUNCTURE: CPT | Performed by: FAMILY MEDICINE

## 2022-08-23 PROCEDURE — 99215 OFFICE O/P EST HI 40 MIN: CPT | Performed by: FAMILY MEDICINE

## 2022-08-23 PROCEDURE — 80053 COMPREHEN METABOLIC PANEL: CPT | Performed by: FAMILY MEDICINE

## 2022-08-23 PROCEDURE — 83036 HEMOGLOBIN GLYCOSYLATED A1C: CPT | Performed by: FAMILY MEDICINE

## 2022-08-23 PROCEDURE — 85027 COMPLETE CBC AUTOMATED: CPT | Performed by: FAMILY MEDICINE

## 2022-08-23 RX ORDER — ESCITALOPRAM OXALATE 20 MG/1
20 TABLET ORAL DAILY
Qty: 90 TABLET | Refills: 0 | Status: SHIPPED | OUTPATIENT
Start: 2022-08-23 | End: 2023-01-05

## 2022-08-23 ASSESSMENT — PATIENT HEALTH QUESTIONNAIRE - PHQ9
SUM OF ALL RESPONSES TO PHQ QUESTIONS 1-9: 5
SUM OF ALL RESPONSES TO PHQ QUESTIONS 1-9: 5
10. IF YOU CHECKED OFF ANY PROBLEMS, HOW DIFFICULT HAVE THESE PROBLEMS MADE IT FOR YOU TO DO YOUR WORK, TAKE CARE OF THINGS AT HOME, OR GET ALONG WITH OTHER PEOPLE: SOMEWHAT DIFFICULT

## 2022-08-23 NOTE — PROGRESS NOTES
Assessment & Plan       ICD-10-CM    1. Type 2 diabetes mellitus with hyperglycemia, with long-term current use of insulin (H)  E11.65 HEMOGLOBIN A1C    Z79.4 Comprehensive metabolic panel (BMP + Alb, Alk Phos, ALT, AST, Total. Bili, TP)     HEMOGLOBIN A1C     Comprehensive metabolic panel (BMP + Alb, Alk Phos, ALT, AST, Total. Bili, TP)   2. Essential hypertension with goal blood pressure less than 140/90  I10    3. Stage 3b chronic kidney disease (H)  N18.32 Comprehensive metabolic panel (BMP + Alb, Alk Phos, ALT, AST, Total. Bili, TP)     Comprehensive metabolic panel (BMP + Alb, Alk Phos, ALT, AST, Total. Bili, TP)   4. GAVE (gastric antral vascular ectasia)  K31.819 CBC with platelets     CBC with platelets   5. Paroxysmal atrial fibrillation (H)  I48.0 Comprehensive metabolic panel (BMP + Alb, Alk Phos, ALT, AST, Total. Bili, TP)     Comprehensive metabolic panel (BMP + Alb, Alk Phos, ALT, AST, Total. Bili, TP)   6. Encounter for long-term (current) use of medications  Z79.899    7. Hip pain, right  M25.551    8. Insomnia, unspecified type  G47.00    9. Fatty liver disease, nonalcoholic  K76.0 Comprehensive metabolic panel (BMP + Alb, Alk Phos, ALT, AST, Total. Bili, TP)     Comprehensive metabolic panel (BMP + Alb, Alk Phos, ALT, AST, Total. Bili, TP)   10. Major depressive disorder, recurrent episode, moderate (H)  F33.1       81yo here with Sister Nghia for follow-up of multiple issues.    DM II- A1C today a bit up at 6.4 (from 5.9 in 5/22), but still in good control.  One episode of significant hypoglycemia (after carb/sugar heavy breakfast), but feels she can avoid those with better eating.  Will make sure she has easy access to food in purse, and eat right away if return of symptoms despite efforts. Follow-up with MTM coming up soon for further discussion.    HTN/CKD- BP high initially, but looked very good on recheck.    Recheck CMP today.  Will cont on current medications.  No se's.    GAVE/GI  "bleeding/anemia-   Has been following closely with GI for GAVE, laser treatments.  She does feel low-level stomach discomfort at baseline lately, but no known triggers, no worsening, not affecting eating/activity.  Will continue to monitor.  Stools have been normal.   CBC recheck today after appt shows hgb back in normal range at 12.3- for the first time in awhile.    Mitzy- Recently met with interventional cardiology to discuss watchman procedure for [email or web address] to help see if she could stop anticoagulation.  Scheduled for Sept, and she has pre-op appt and labs prior through cardiology.    CHF- Pt continues to follow-up with Dr. Rosa.    R hip pain- following with pain clinic, on medications and injections about every three months.  Had great things to say about Dr. Alba- much less pain with this recent injection with increased numbing and going slower.  She also met with Dr. Shannon to discuss surgical options, and will pursue this further after her cardiac procedure has been completed.    Sleep disruption-  Sleep likely most disrupted by pain, and secondly by noisy neighbors.  Reviewed sleep/pain medications- she is wary of adding new medications.  Trazodone is on her med list to take 150mg nightly, but she only takes 100mg some nights.  Would try 150mg nightly to see if she sleeps better.  If pain is still waking her up occasionally, she can take her pain medication/s.      Return in about 3 months (around 11/23/2022) for Physical Exam, Diabetes.  She has not had a Wellness/Physical appointment in a few yrs - will schedule.      48 minutes spent on the date of the encounter doing chart review, review of test results, interpretation of tests, patient visit and documentation        BMI:   Estimated body mass index is 30.6 kg/m  as calculated from the following:    Height as of 7/21/22: 1.676 m (5' 5.98\").    Weight as of this encounter: 86 kg (189 lb 8 oz).   Weight management plan: Discussed healthy diet and " exercise guidelines  Blood sugar testing frequency justification:  Frequent hypoglycemia and Risk of hypoglycemia with medication(s)    Ilene Tristan MD  [facility]          Subjective   Sister Betty is a 82 year old, presenting for the following health issues:  Diabetes  Multiple other health issues    History of Present Illness       Diabetes:   She presents for follow up of diabetes.  She is checking home blood glucose three times daily. She checks blood glucose before meals.  Blood glucose is sometimes over 200 and never under 70. She is aware of hypoglycemia symptoms including shakiness, dizziness and confusion. She is concerned about other. She is having numbness in feet, burning in feet and excessive thirst.         She eats 2-3 servings of fruits and vegetables daily.She consumes 0 sweetened beverage(s) daily.She exercises with enough effort to increase her heart rate 9 or less minutes per day.  She exercises with enough effort to increase her heart rate 3 or less days per week.   She is taking medications regularly.    Today's PHQ-9         PHQ-9 Total Score: 5    PHQ-9 Q9 Thoughts of better off dead/self-harm past 2 weeks :   Not at all    How difficult have these problems made it for you to do your work, take care of things at home, or get along with other people: Somewhat difficult     Low- Sun after Jain, felt unsteady, reached and missed for a chair, someone helped her to sit down.  That am, had [place] toast and a peach.    DM II- will check A1C today- follow-up with MTM on 9/6/22.    HTN- BP recheck 126/60  No se's on current meds.      Updates--  Cardiology- seen per GI recs, options for cardiac procedure to avoid anticoagulation given GI bleeding.    7/21/22 appt - discussed Watchman device procedure, now planned for Sept.  Pre-op the Monday prior to the procedure (9/25/22)      GI- GAVE dx, endoscopy laser txts, hgb has been improving, and stools have been normal.   She does notice that since the laser txt- sometimes discomfort in general abd area.  Just kind of there.  Doesn't stop her from eating.  Not changing.      Insomnia-   Sleep has been tough, but likely more the pain issues.  Also, neighbors- talking loud at all hours.  Lots of people live there, coming and going.  Not friendly.  Affects her sleep as well, though pain is likely the main issue.  Last night, pain was bad, so she got up and took a norco and robaxin.   Still couldn't go back to sleep well after taking them, though.  Has been taking the trazodone prn - 2 tabs - if she takes them.  Will try 3 at night.      Pain clinic- 7/6/22 appt- on norco, methocarbamol, tylenol TID, injection q3 months.  Dr. Alba was great, very responsive, slower, did two spots for numbing injections.      Ortho consult for R hip pain- follow-up after cardiac procedure  Dr. Shannon, also great consultation.      Rheum- Dr. Lopez following, stable sx's, still on prednisone 5mg/d.        Review of Systems   Constitutional, HEENT, cardiovascular, pulmonary, gi and gu systems are negative, except as otherwise noted.      Objective    /60   Pulse 55   Temp 97.1  F (36.2  C) (Temporal)   Resp 18   Wt 86 kg (189 lb 8 oz)   SpO2 99%   BMI 30.60 kg/m    Body mass index is 30.6 kg/m .  Physical Exam   GENERAL APPEARANCE: healthy, alert and no distress     EYES: sclera clear, EOMI     RESP: lungs clear to auscultation - no rales, rhonchi or wheezes     CV: regular rates and rhythm, normal S1 S2, no S3 or S4 and no murmur, click or rub      Ext: warm, dry, no edema       Psych: full range affect, normal speech and grooming, judgement and insight intact     Lab on 08/05/2022   Component Date Value Ref Range Status     Sodium 08/05/2022 141  136 - 145 mmol/L Final     Potassium 08/05/2022 3.9  3.4 - 5.3 mmol/L Final     Creatinine 08/05/2022 0.98 (A) 0.51 - 0.95 mg/dL Final     Urea Nitrogen 08/05/2022 16.8  8.0 - 23.0 mg/dL Final      Chloride 08/05/2022 106  98 - 107 mmol/L Final     Carbon Dioxide (CO2) 08/05/2022 22  22 - 29 mmol/L Final     Anion Gap 08/05/2022 13  7 - 15 mmol/L Final     Glucose 08/05/2022 86  70 - 99 mg/dL Final     Calcium 08/05/2022 9.6  8.8 - 10.2 mg/dL Final     Protein Total 08/05/2022 6.7  6.4 - 8.3 g/dL Final     Albumin 08/05/2022 4.0  3.5 - 5.2 g/dL Final     Bilirubin Total 08/05/2022 0.3  <=1.2 mg/dL Final     Alkaline Phosphatase 08/05/2022 67  35 - 104 U/L Final     AST 08/05/2022 23  10 - 35 U/L Final     ALT 08/05/2022 13  10 - 35 U/L Final     GFR Estimate 08/05/2022 57 (A) >60 mL/min/1.73m2 Final    Effective December 21, 2021 eGFRcr in adults is calculated using the 2021 CKD-EPI creatinine equation which includes age and gender (Angelita et al., NEJ, DOI: 10.1056/QVSTep5412101)     Parathyroid Hormone Intact 08/05/2022 33  15 - 65 pg/mL Final     Vitamin D, Total (25-Hydroxy) 08/05/2022 56  20 - 75 ug/L Final     Results for orders placed or performed in visit on 08/23/22   HEMOGLOBIN A1C     Status: Abnormal   Result Value Ref Range    Hemoglobin A1C 6.4 (H) 0.0 - 5.6 %   CBC with platelets     Status: Normal   Result Value Ref Range    WBC Count 7.6 4.0 - 11.0 10e3/uL    RBC Count 4.03 3.80 - 5.20 10e6/uL    Hemoglobin 12.3 11.7 - 15.7 g/dL    Hematocrit 39.0 35.0 - 47.0 %    MCV 97 78 - 100 fL    MCH 30.5 26.5 - 33.0 pg    MCHC 31.5 31.5 - 36.5 g/dL    RDW 14.9 10.0 - 15.0 %    Platelet Count 207 150 - 450 10e3/uL                 .  ..

## 2022-08-24 ENCOUNTER — OFFICE VISIT (OUTPATIENT)
Dept: PALLIATIVE MEDICINE | Facility: CLINIC | Age: 82
End: 2022-08-24
Payer: MEDICARE

## 2022-08-24 VITALS — SYSTOLIC BLOOD PRESSURE: 143 MMHG | HEART RATE: 56 BPM | DIASTOLIC BLOOD PRESSURE: 59 MMHG | OXYGEN SATURATION: 98 %

## 2022-08-24 DIAGNOSIS — M16.11 OSTEOARTHRITIS OF RIGHT HIP, UNSPECIFIED OSTEOARTHRITIS TYPE: Primary | ICD-10-CM

## 2022-08-24 DIAGNOSIS — M25.551 HIP PAIN, RIGHT: ICD-10-CM

## 2022-08-24 DIAGNOSIS — M79.18 MYOFASCIAL PAIN: ICD-10-CM

## 2022-08-24 LAB
ALBUMIN SERPL-MCNC: 3.2 G/DL (ref 3.4–5)
ALP SERPL-CCNC: 80 U/L (ref 40–150)
ALT SERPL W P-5'-P-CCNC: 14 U/L (ref 0–50)
ANION GAP SERPL CALCULATED.3IONS-SCNC: 8 MMOL/L (ref 3–14)
AST SERPL W P-5'-P-CCNC: 13 U/L (ref 0–45)
BILIRUB SERPL-MCNC: 0.4 MG/DL (ref 0.2–1.3)
BUN SERPL-MCNC: 17 MG/DL (ref 7–30)
CALCIUM SERPL-MCNC: 8.6 MG/DL (ref 8.5–10.1)
CHLORIDE BLD-SCNC: 107 MMOL/L (ref 94–109)
CO2 SERPL-SCNC: 22 MMOL/L (ref 20–32)
CREAT SERPL-MCNC: 1.08 MG/DL (ref 0.52–1.04)
GFR SERPL CREATININE-BSD FRML MDRD: 51 ML/MIN/1.73M2
GLUCOSE BLD-MCNC: 198 MG/DL (ref 70–99)
POTASSIUM BLD-SCNC: 4.1 MMOL/L (ref 3.4–5.3)
PROT SERPL-MCNC: 6.6 G/DL (ref 6.8–8.8)
SODIUM SERPL-SCNC: 137 MMOL/L (ref 133–144)

## 2022-08-24 PROCEDURE — 99213 OFFICE O/P EST LOW 20 MIN: CPT | Performed by: PHYSICAL MEDICINE & REHABILITATION

## 2022-08-24 ASSESSMENT — PAIN SCALES - GENERAL: PAINLEVEL: SEVERE PAIN (6)

## 2022-08-24 NOTE — PROCEDURES
[facility] Follow Up     Date of visit: 8/24/2022    Last seen by me on 7/6/2022.     Assessment:  Sister Betty Buckner is a 82 year old medically complex patient with past medical history including: Atrial fibrillation, VLH, History of DVT, CHF, HLD, HTN, DM 2, Stage 3 CKD, RLS, Depression, History of etoh abuse (remission 30+ years) with the following chronic pain conditions:     1. Right sided hip and leg pain: Betty reports a long standing history of right hip and leg pain. They've been having worsening right lateral hip and groin pain. They also have a long standing history of pain that starts in her right low back/upper buttock and radiates laterally and posteriorly down the right leg to the foot. Initially was getting improvement with piriformis injection but declining benefit over time. Significant but short term improvement noted with hip injection x 2. Appears that their hip pain is likely due to a combination of right sided gluteal weakness/dysfunction and severe right hip OA.    Plan:  1. Physical Therapy: on hold for now. Consider getting access to a pool to do some gentle exercises.   2. Clinical Health Pain Psychologist: Coping well defer.  3. Diagnostic Studies: None at this time.  4. Medication Management:   1. Continue Norco 7.5-325mg BID prn. Discussed she should try to utilize this more regularly to see if it provides benefit.   2. Continue methocarbamol 750mg TID prn.   3. tylenol 1000mg TID prn   4. CSA/UDS - 9/2/21  5. Further procedures recommended: None at this time  6. Follow up: 2-3 months      Chief complaint:   Chief Complaint   Patient presents with     Pain     Since her last visit, Betty Tee reports:  - She had a repeat right hip injection on 7/22/2022. This provided about 1 week of pain relief before pain returned to baseline.   - Pain continues to be worse with walking. Ok when sitting.   - Still not using Norco regularly.   - planning on  having hip replacement surgery but will need to wait until she has a cardiac procedure completed first.      Pain scores:  Pain intensity on average is 6 on a scale of 0-10.     Medications:       Current pain medications:  -Tylenol 1000mg q8h prn - Usually takes 1-2 times a day  -Escitalopram 20mg daily              -Norco 7.5-325mg prn - takes it but not regularly              -methocarbamol 750mg tid prn              -Voltaren 1% gel qid prn      Current calculated MME: 15         Previous pain medications:  -Tramadol 50mg prn  -Tizanidine, flexeril - nh              -Gabapentin 300mg TID - memory difficulty    Past pain treatments:  Physical Therapy: hasn't completed PT for low back or hip pain                TENS unit: hasn't tried  Pain psychology: hasn't tried  Surgery: Lumbar spine surgery in the 1960s.   Injections:   - right intra-articular hip injections on 7/22/22 and 3/9/22 - H short term   -Right piriformis injection on 4/1/21 with 60% relief for 8 weeks. Repeated in June with approx 50% relief.  -Two epidural injections (right L5 tfesi), feb 2020 was helpful the next day but she had a fall right after, aug 2020 procedure - not helpful, had worsening pain  Alternative Therapies:               Chiropractic: hasn't tried               Acupuncture:  Yes - [place]    Medications:  Current Outpatient Medications   Medication Sig Dispense Refill     ACCU-CHEK SHANNON PLUS test strip USE TO TEST BLOOD SUGARS 3 TIMES DAILY 300 strip 5     acetaminophen (TYLENOL) 500 MG tablet Take 1,000 mg by mouth every 8 hours as needed (max 6 tablets/24 hours, 2 tablets/dose)        acyclovir (ZOVIRAX) 400 MG tablet Take 1 tablet (400 mg) by mouth 3 times daily For a couple days 15 tablet 2     acyclovir (ZOVIRAX) 5 % external ointment Apply topically 6 times daily As needed for outbreaks 15 g 3     albuterol (PROAIR HFA/PROVENTIL HFA/VENTOLIN HFA) 108 (90 Base) MCG/ACT inhaler Inhale 2 puffs into the lungs every 6 hours 18 g 11      alendronate (FOSAMAX) 70 MG tablet Take 1 tablet (70 mg) by mouth every 7 days 12 tablet 0     anakinra (KINERET) 100 MG/0.67ML SOSY injection 100 mg every day x 3 days as needed for flare ups 6.7 mL 3     atorvastatin (LIPITOR) 10 MG tablet TAKE 1/2 TABLET BY MOUTH EVERY DAY 45 tablet 2     azithromycin (ZITHROMAX) 250 MG tablet TAKE 1 TABLET BY MOUTH EVERY DAY 30 tablet 5     BD PEN NEEDLE JANE 2ND GEN 32G X 4 MM miscellaneous USE 4 DAILY AS DIRECTED. 400 each 1     blood glucose (NO BRAND SPECIFIED) lancets standard Use to test blood sugar 3 times daily or as directed 100 lancet 3     cevimeline (EVOXAC) 30 MG capsule Take 1 capsule (30 mg) by mouth 3 times daily (Patient taking differently: Take 30 mg by mouth every other day) 270 capsule 2     Cholecalciferol (VITAMIN D3) 50 MCG (2000 UT) CAPS Take 100 mcg by mouth daily (Take 2 tablet (50 mcg) by mouth daily - Oral) 90 capsule 1     COMPOUNDED NON-CONTROLLED SUBSTANCE (CMPD RX) - PHARMACY TO MIX COMPOUNDED MEDICATION Estriol 1 mg/g Apply small amount to finger and apply to inside vagina daily for 2 weeks then twice weekly Route: vaginally Dispense 30 grams 11 refills (Patient taking differently: Estriol 1 mg/g Apply small amount to finger and apply to inside vagina twice weekly Route: vaginally Dispense 30 grams 11 refills) 30 g 11     cyanocobalamin (VITAMIN B-12) 1000 MCG tablet Take 1 tablet (1,000 mcg) by mouth daily 30 tablet 1     escitalopram (LEXAPRO) 20 MG tablet Take 1 tablet (20 mg) by mouth daily 90 tablet 0     ferrous sulfate (FEROSUL) 325 (65 Fe) MG tablet TAKE 1 TABLET BY MOUTH EVERY DAY WITH BREAKFAST 90 tablet 0     fluticasone (FLONASE) 50 MCG/ACT nasal spray SPRAY 1-2 SPRAYS INTO BOTH NOSTRILS DAILY AS NEEDED FOR ALLERGIES 16 mL 11     fluticasone-vilanterol (BREO ELLIPTA) 100-25 MCG/INH inhaler Inhale 1 puff into the lungs daily 1 each 11     HYDROcodone-acetaminophen (NORCO) 7.5-325 MG per tablet Take 1 tablet by mouth every 12 hours  OK to fill and start 08/12/22 60 tablet 0     hydroxychloroquine (PLAQUENIL) 200 MG tablet Take 2 tablets (400 mg) by mouth daily Get annual eye exams for hydroxychloroquine (Plaquenil) monitoring and fax to [phone] 180 tablet 3     insulin glargine (BASAGLAR KWIKPEN) 100 UNIT/ML pen INJECT 22 UNITS SUBCUTANEOUS DAILY (TO REPLACE LANTUS) 15 mL 0     ketoconazole (NIZORAL) 2 % external cream Apply topically 2 times daily as needed for itching 60 g 1     KLOR-CON 20 MEQ CR tablet TAKE 2 TABLETS (40 MEQ) BY MOUTH EVERY MORNING AND 1 TABLET (20 MEQ) EVERY EVENING. 270 tablet 3     lidocaine (LIDODERM) 5 % patch APPLY PATCH TO PAINFUL AREA FOR UP TO 12 H WITHIN A 24 H PERIOD. REMOVE AFTER 12 HOURS. 30 patch 2     loratadine (CLARITIN) 10 MG tablet TAKE 1 TABLET BY MOUTH EVERY DAY 90 tablet 3     methocarbamol (ROBAXIN) 750 MG tablet Take 1 tablet (750 mg) by mouth 3 times daily as needed for muscle spasms 90 tablet 3     metoprolol succinate ER (TOPROL-XL) 50 MG 24 hr tablet Take 1 tablet (50 mg) by mouth 2 times daily 90 tablet 3     montelukast (SINGULAIR) 10 MG tablet TAKE 1 TABLET BY MOUTH EVERYDAY AT BEDTIME 90 tablet 0     NOVOLOG FLEXPEN 100 UNIT/ML soln INJECT 12 UNITS SUBCUTANEOUS 3 TIMES DAILY (WITH MEALS) 15 mL 1     OZEMPIC, 1 MG/DOSE, 4 MG/3ML SOPN INJECT 1 MG SUBCUTANEOUS ONCE A WEEK 3 mL 2     pantoprazole (PROTONIX) 40 MG EC tablet TAKE 1 TABLET (40 MG) BY MOUTH DAILY (REPLACES FAMOTIDINE- SHOULD STOP TAKING FAMOTIDINE) 90 tablet 0     predniSONE (DELTASONE) 5 MG tablet Take 1 tablet (5 mg) by mouth daily 90 tablet 1     rivaroxaban ANTICOAGULANT (XARELTO ANTICOAGULANT) 20 MG TABS tablet TAKE 1 TABLET BY MOUTH DAILY WITH DINNER 90 tablet 3     spironolactone (ALDACTONE) 25 MG tablet Take 2 tablets (50 mg) by mouth daily 180 tablet 3     torsemide (DEMADEX) 10 MG tablet TAKE ONE TAB (10 MG) WITH ONE 20 MG TAB TO = 30 MG DAILY IN AFTERNOON. 90 tablet 3     torsemide (DEMADEX) 20 MG tablet TAKE 2  TABLETS (40 MG) BY MOUTH EVERY MORNING AND 1 TABLET (20 MG) DAILY AT 2 PM. TAKE THE AFTERNOON DOSE WITH AN EXTRA 10 MG TAB FOR A TOTAL OF 30 MG IN THE AFTERNOON 270 tablet 3     traZODone (DESYREL) 50 MG tablet Take 3 tablets (150 mg) by mouth nightly as needed for sleep 135 tablet 1     Diagnostic tests:  X-ray of right hip on 12/9/21 shows:  FINDINGS: AP and frog-leg lateral right hip views were obtained.      No acute osseous abnormality. Severe degenerative changes of the right  hip, progressed compared to prior. Vascular ossifications. Lumbar  spondylosis. Right SI joint degenerative change.        CSA and UDS due - 10/2022    Physical Exam:  Blood pressure (!) 143/59, pulse 56, SpO2 98 %, not currently breastfeeding.  General: A&O x 3  Gait: Antalgic and slow      Alanna Alba MD  [facility]     BILLING TIME DOCUMENTATION:   The total TIME spent on this patient on the date of the encounter/appointment was 20 minutes.      TOTAL TIME includes:   Time spent preparing to see the patient (reviewing records and tests)  Time spent face to face (or over the phone) with the patient   Time spent ordering tests, medications, procedures and referrals  Time spent documenting clinical information in Epic

## 2022-08-24 NOTE — PATIENT INSTRUCTIONS
Try using the Norco sooner when your pain is increasing rather than waiting until it is very severe.   You can continue the muscle relaxant (methocarbamol) 3 times daily as needed.   Follow up with orthopedics regarding timing of hip replacement.   Think about getting a [place] membership to get access to a swimming pool to do some gentle exercises.   Follow up with me in 2-3 months.     Alanna Alba MD  [facility]         ----------------------------------------------------------------  Clinic Number:  [phone]   Call with any questions about your care and for scheduling assistance.   Calls are returned Monday through Friday between 8 AM and 4:30 PM. We usually get back to you within 2 business days depending on the issue/request.    If we are prescribing your medications:  For opioid medication refills, call the clinic or send a [employer] message 7 days in advance.  Please include:  Name of requested medication  Name of the pharmacy.  For non-opioid medications, call your pharmacy directly to request a refill. Please allow 3-4 days to be processed.   Per [place] State Law:  All controlled substance prescriptions must be filled within 30 days of being written.    For those controlled substances allowing refills, pickup must occur within 30 days of last fill.      We believe regular attendance is key to your success in our program!    Any time you are unable to keep your appointment we ask that you call us at least 24 hours in advance to cancel.This will allow us to offer the appointment time to another patient.   Multiple missed appointments may lead to dismissal from the clinic.

## 2022-08-26 DIAGNOSIS — I48.21 PERMANENT ATRIAL FIBRILLATION (H): Primary | ICD-10-CM

## 2022-09-01 NOTE — RESULT ENCOUNTER NOTE
Here are your lab results from your recent visit...  --Your basic metabolic panel (which includes electrolyte levels, blood sugar level and kidney function tests) looks stable.  We should keep monitoring this every few months.  --Your hemoglobin A1C (the three month average of your glucose levels) is a bit higher but still in a good range.  --Your CBC labs (which includes blood labs looking for signs of infection or anemia) looks as good as it has in awhile.  Your hemoglobin is up to 12.3- finally back in the normal range which is great.    Please let me know if you have any questions.  Best,   Lev Tristan MD

## 2022-09-06 ENCOUNTER — VIRTUAL VISIT (OUTPATIENT)
Dept: PHARMACY | Facility: CLINIC | Age: 82
End: 2022-09-06
Payer: COMMERCIAL

## 2022-09-06 DIAGNOSIS — M81.0 OSTEOPOROSIS, UNSPECIFIED OSTEOPOROSIS TYPE, UNSPECIFIED PATHOLOGICAL FRACTURE PRESENCE: ICD-10-CM

## 2022-09-06 DIAGNOSIS — Z79.4 TYPE 2 DIABETES MELLITUS WITH HYPERGLYCEMIA, WITH LONG-TERM CURRENT USE OF INSULIN (H): Primary | ICD-10-CM

## 2022-09-06 DIAGNOSIS — D64.9 ANEMIA: ICD-10-CM

## 2022-09-06 DIAGNOSIS — F39 MOOD DISORDER (H): ICD-10-CM

## 2022-09-06 DIAGNOSIS — I50.32 CHRONIC HEART FAILURE WITH PRESERVED EJECTION FRACTION (H): ICD-10-CM

## 2022-09-06 DIAGNOSIS — D64.9 ANEMIA, UNSPECIFIED TYPE: ICD-10-CM

## 2022-09-06 DIAGNOSIS — R52 PAIN: ICD-10-CM

## 2022-09-06 DIAGNOSIS — L65.9 HAIR LOSS: ICD-10-CM

## 2022-09-06 DIAGNOSIS — G47.00 INSOMNIA, UNSPECIFIED TYPE: ICD-10-CM

## 2022-09-06 DIAGNOSIS — I48.0 PAROXYSMAL ATRIAL FIBRILLATION (H): ICD-10-CM

## 2022-09-06 DIAGNOSIS — N18.32 STAGE 3B CHRONIC KIDNEY DISEASE (H): ICD-10-CM

## 2022-09-06 DIAGNOSIS — I10 ESSENTIAL HYPERTENSION WITH GOAL BLOOD PRESSURE LESS THAN 140/90: ICD-10-CM

## 2022-09-06 DIAGNOSIS — E11.65 TYPE 2 DIABETES MELLITUS WITH HYPERGLYCEMIA, WITH LONG-TERM CURRENT USE OF INSULIN (H): Primary | ICD-10-CM

## 2022-09-06 PROCEDURE — 99607 MTMS BY PHARM ADDL 15 MIN: CPT

## 2022-09-06 PROCEDURE — 99606 MTMS BY PHARM EST 15 MIN: CPT

## 2022-09-06 NOTE — PATIENT INSTRUCTIONS
"Recommendations from today's MTM visit:                                                       1. Continue to use Basaglar 24 units daily   2. Change your Novolog dosing to now 8 units three times daily with meals   4. Start taking hydrocodone-acetaminophen more regularly, if you find that this does not help we could discuss other pain medications.  5. I will reach out to your cardiology provider and let them know that you should be taking a lower dose of Xarelto of 15mg daily based on your kidney function.   6. I looked into your medications about the hair loss-  Acyclovir, alendronate, atorvastatin, cevimeline, escitalopram, hydroxychloroquine, ketoconazole, loratadine, metoprolol, Xarelto, Ozempic, and trazodone are all medications that I found that have any reports of patients experiencing hair loss or alopecia- that means only a couple people out of anyone that has ever taken the medication has experienced hair loss after just starting one of these medications. You have been on all of these medications for sometime so I would suspect that recent hair loss is more due to increase in life stressors and pain. A dose reduction in Xarelto due to your kidney function may also help with hair loss if it is a contributing factor.     Follow-up:  Will reach back out in November after your surgery     It was great speaking with you today.  I value your experience and would be very thankful for your time in providing feedback in our clinic survey. In the next few days, you may receive an email or text message from [employer] with a link to a survey related to your  clinical pharmacist.\"     To schedule another MTM appointment, please call the clinic directly or you may call the MTM scheduling line at [phone] or toll-free at [phone].     My Clinical Pharmacist's contact information:                                                      Please feel free to contact me with any questions or concerns you have.    "   Janeth Perez, PharmD  Medication Therapy Management Resident, [facility]  Pager: [phone]  Email: [email or web address]    Sabrina Meek, Pharm.D., M.B.A., [facility]  Medication Therapy Management Pharmacist, [facility]  Pager: [phone]  Email: [email or web address]

## 2022-09-06 NOTE — PROGRESS NOTES
Medication Therapy Management (MTM) Encounter    ASSESSMENT:                            Medication Adherence/Access: No issues identified    Type 2 Diabetes: Patient is meeting A1c goal of < 7%. Patient continues to experience episodes of hypoglycemia, would recommend decreasing Novolog meal time insulin to help reduce risk of low blood sugars. In the future could consider Jardiance again to keep blood sugars controlled with no risk of hypoglycemia in hopes to discontinue insulin. Would not recommend starting Jardiance now due to scheduled Watchman procedure. If starting Jardiance in the future would closely monitor kidney function, would expect a slight rise in Scr at initiation but would still have good kidney protection including a reduction in albuminuria and CHF benefits as well.       HTN/CKD/CHF/Afib: Patient is scheduled for Watchman procedure at the end of the month. Based on the patient's current CrCl of 44 mL/min the patient's dose of Xarelto should be reduced to 15mg once daily- will reach out to cardiology with recommendation since Xarelto will continue to be used before and after the procedure would recommend dose change today. See note above about considerations of Jardiance in the future.     Osteoporosis: Stable    Hip Pain: Patient is found that she is hesitant to taking pain medications regularly due to past experiences. Patient is open to taking hydrocodone-acetaminophen more regularly to get ahead of pain. Hydrocodone is metabolized by CYP2D6 to hydromorphone- patient could have a metabolism that would limit the effectiveness of hydrocodone. Oxycodone has been helpful for the patient in the past and has no metabolism concerns, this could be a potential option in the future in replace of hydrocodone if the patient finds little to no benefit of taking hydrocodone-acetaminophen more regularly until the patient is able to get a hip replacement.     Anemia: Stable Hemoglobin has improved to 12.3  since starting ferrous sulfate and vitamin B12, recommend to continue ferrous sulfate and vitamin B12 until the patient is complete with Xarelto therapy. A dose reduction in Xarelto will also help to minimize bleeding.     Depression/Sleep: Overall stable, many life stressors that patient is managing.      Hair loss: Acyclovir, alendronate, atorvastatin, cevimeline, escitalopram, hydroxychloroquine, ketoconazole, loratadine, metoprolol, Xarelto, Ozempic, and trazodone are all medications that have reports of patients experiencing hair loss or alopecia, it appears that the patient has been on all of these medications for years. Recent hair loss is more due to increase in life stressors and pain. A dose reduction in Xarelto due to kidney function may also help with hair loss if it is a contributing factor.     PLAN:                            1. Continue to use Basaglar 24 units daily   2. Change your Novolog dosing to 8 units three times daily with meals   4. Start taking hydrocodone-acetaminophen more regularly, if you find that this does not help we could discuss other pain medications.  5. I will reach out to your cardiology provider and let them know that you should be taking a lower dose of Xarelto of 15mg daily based on your kidney function.   6. I looked into your medications about the hair loss-  Acyclovir, alendronate, atorvastatin, cevimeline, escitalopram, hydroxychloroquine, ketoconazole, loratadine, metoprolol, Xarelto, Ozempic, and trazodone are all medications that have reports of patients experiencing hair loss or alopecia- that means only a couple people out of anyone that has ever taken the medication has experienced hair loss after starting one of these medications. You have been on all of these medications for sometime so we suspect that recent hair loss is more due to increase in life stressors and pain. A dose reduction in Xarelto due to your kidney function may also help with hair loss if it is  a contributing factor.     Follow-up: I will check on you in 1-2 weeks to see how the reduction in your Novolog dose is going and if taking hydrocodone/APAP a little bit more often is helpful. Let's do another video visit in October after your surgery     SUBJECTIVE/OBJECTIVE:                          Betty Tee is a 82 year old female contacted via secure video for a follow-up visit.  Today's visit is a follow-up MT visit from 2022. Is accompanied by Nghia.     Reason for visit: diabetes follow up and any other questions. Patient has concerns with low blood sugars, recent hair loss, and memory loss.     Allergies/ADRs: Reviewed in chart  Past Medical History: Reviewed in chart  Tobacco: She reports that she quit smoking about 11 years ago. Her smoking use included cigarettes. She has a 5.00 pack-year smoking history. She has never used smokeless tobacco.    Medication Adherence/Access: no issues reported    Type 2 Diabetes:  Currently taking Basaglar 24 units daily, Novolog 11 units 3 times daily (before meals), Ozempic 1 mg weekly. Patient is not experiencing side effects.  Blood sugar monitoring: 3 time(s) daily. Ranges (based on fasting glucometer readings): See below  Mornins  Afternoon: little higher than in the morning 170-180s  Evenin-140s  Symptoms of low blood sugar? Sugars have gone into the 60-70s a couple of times. Had one incidence of hypoglycemia one day at breakfast at 11AM reports when eating a high carbohydrate meal for breakfast. States that her thinking is altered during these events. Usually has a hypoglycemic event about every 10 days.   Symptoms of high blood sugar? none  Eye exam: up to date  Foot exam: due  Diet/Exercise: Has been working on adding more protein to her meals, she has been limited in exercise due to hip pain.   Aspirin: Not taking due to current Rivaroxaban use  Statin: Yes: atorvastatin 5 mg daily    ACEi/ARB: No- stopped due to low BP by cardiology in  2019.  Urine albumin remains elevated at 50 at last check, but is actually improved from previous measurements over 100.  Unable to tolerate SGLT2i due to inc in SCr/decrease in eGFR    Urine Albumin:   Lab Results   Component Value Date    UMALCR 50.00 (H) 12/09/2021      Lab Results   Component Value Date    A1C 6.4 08/23/2022    A1C 5.9 05/25/2022    A1C 6.4 02/11/2022    A1C 6.3 12/09/2021    A1C 7.1 06/04/2021    A1C 8.0 03/03/2021    A1C 8.7 10/20/2020    A1C 6.8 06/19/2020    A1C 7.0 01/21/2020      HTN/CKD/CHF/Afib: Watchman procedure scheduled for 9/26/2022, currently taking Xarelto 20 mg daily, potassium 40meq in the AM and 20meq in the PM, metopolol succinate 50mg twice daily, spironolactone 50mg daily, torsemide 40mg in the AM and 30mg in the afternoon.     Last Comprehensive Metabolic Panel:  Sodium   Date Value Ref Range Status   08/23/2022 137 133 - 144 mmol/L Final   06/04/2021 137 133 - 144 mmol/L Final     Potassium   Date Value Ref Range Status   08/23/2022 4.1 3.4 - 5.3 mmol/L Final   06/04/2021 4.0 3.4 - 5.3 mmol/L Final     Chloride   Date Value Ref Range Status   08/23/2022 107 94 - 109 mmol/L Final   06/04/2021 106 94 - 109 mmol/L Final     Carbon Dioxide   Date Value Ref Range Status   06/04/2021 25 20 - 32 mmol/L Final     Carbon Dioxide (CO2)   Date Value Ref Range Status   08/23/2022 22 20 - 32 mmol/L Final     Anion Gap   Date Value Ref Range Status   08/23/2022 8 3 - 14 mmol/L Final   06/04/2021 6 3 - 14 mmol/L Final     Glucose   Date Value Ref Range Status   08/23/2022 198 (H) 70 - 99 mg/dL Final   06/04/2021 142 (H) 70 - 99 mg/dL Final     Urea Nitrogen   Date Value Ref Range Status   08/23/2022 17 7 - 30 mg/dL Final   06/04/2021 14 7 - 30 mg/dL Final     Creatinine   Date Value Ref Range Status   08/23/2022 1.08 (H) 0.52 - 1.04 mg/dL Final   06/04/2021 1.11 (H) 0.52 - 1.04 mg/dL Final     GFR Estimate   Date Value Ref Range Status   08/23/2022 51 (L) >60 mL/min/1.73m2 Final      "Comment:     Effective December 21, 2021 eGFRcr in adults is calculated using the 2021 CKD-EPI creatinine equation which includes age and gender (Angelita et al., NEJ, DOI: 10.1056/FJYNry7104580)   06/04/2021 47 (L) >60 mL/min/[1.73_m2] Final     Comment:     Non  GFR Calc  Starting 12/18/2018, serum creatinine based estimated GFR (eGFR) will be   calculated using the Chronic Kidney Disease Epidemiology Collaboration   (CKD-EPI) equation.       Calcium   Date Value Ref Range Status   08/23/2022 8.6 8.5 - 10.1 mg/dL Final   06/04/2021 8.8 8.5 - 10.1 mg/dL Final     Osteoporosis: Stable. Currently taking vitamin D3 4000 units daily and alendronate 70 mg weekly     Hip Pain: Currently taking hydrocodone-acetaminophen 7.5-325mg as needed no more than 2 tablets in 1 day. Reports recent hip pain and took 1 tablet of hydrocodone-acetaminophen today. Sister Betty is not sure if the hydrocodone-acetaminophen is effective for pain or if it is her fault that she is not taking it more regularly (details below).  She states that she prefers to take hydrocodone-acetaminophen when she has pain but was recommended to take more consistently to get ahead of the pain. Has tried medical marijuana topically in the past but doesn't remember it being effective. Methocarbamol, lidocaine patches, and acetaminophen are not helpful for pain. She has taken oxycodone in the past after a surgery but experienced some withdrawal symptoms after stopping \"cold turkey\" because she wasn't told to gradually decrease dose down before stopping- admits that this has contributed to her hesitancy to take pain medications regularly. Overall pain is worse when walking and moving, reports no pain when sitting. Has discussed hip replacement surgery after Watchman procedure.    Vitamin deficiency/Anemia: Currently taking vitamin b-12 1 tablet daily and ferrous sulfate 325mg daily reports no side effects.  Lab Results   Component Value Date    WBC " "7.6 08/23/2022    WBC 8.6 06/04/2021     Lab Results   Component Value Date    RBC 4.03 08/23/2022    RBC 4.46 06/04/2021     Lab Results   Component Value Date    HGB 12.3 08/23/2022    HGB 13.4 06/04/2021     Lab Results   Component Value Date    HCT 39.0 08/23/2022    HCT 42.1 06/04/2021     No components found for: MCT  Lab Results   Component Value Date    MCV 97 08/23/2022    MCV 94 06/04/2021     Lab Results   Component Value Date    MCH 30.5 08/23/2022    MCH 30.0 06/04/2021     Lab Results   Component Value Date    MCHC 31.5 08/23/2022    MCHC 31.8 06/04/2021     Lab Results   Component Value Date    RDW 14.9 08/23/2022    RDW 15.5 06/04/2021     Lab Results   Component Value Date     08/23/2022     06/04/2021     Depression/Sleep: Currently taking escitalopram 20 mg daily and trazodone 150mg as needed. Currently experiencing many stressors in life (friend is \"not doing well\", gun shots frequently around home) that are effecting her mood and sleep.     Hair loss: Reports increase in hair loss. Is not sure if it could be related to recent life events or medications.       ----------------    I spent 38 minutes with this patient today. A copy of the visit note was provided to the patient's provider(s).    The patient was sent via [email or web address] a summary of these recommendations.     Janeth Perez, PharmD.   Medication Therapy Management Resident      Sabrina Meek, MarcosD, JAMES, BCACP  MTM Pharmacist, [facility]    Telemedicine Visit Details  Type of service:  Video Conference via [employer]  Start Time: 1:37 PM  End Time: 2:15 PM  Originating Location (patient location): Home  Distant Location (provider location):  [facility]     Medication Therapy Recommendations  Pain    Current Medication: HYDROcodone-acetaminophen (NORCO) 7.5-325 MG per tablet   Rationale: Does not understand instructions - Adherence - Adherence   Recommendation: Provide Education   Status: Patient " Agreed - Adherence/Education         Paroxysmal atrial fibrillation (H)    Current Medication: rivaroxaban ANTICOAGULANT (XARELTO ANTICOAGULANT) 20 MG TABS tablet   Rationale: Dose too high - Dosage too high - Safety   Recommendation: Decrease Dose   Status: Contact Provider - Awaiting Response         Type 2 diabetes mellitus with hyperglycemia, with long-term current use of insulin (H)    Current Medication: NOVOLOG FLEXPEN 100 UNIT/ML soln   Rationale: Dose too high - Dosage too high - Safety   Recommendation: Decrease Dose   Status: Accepted per CPA

## 2022-09-12 DIAGNOSIS — I50.30 (HFPEF) HEART FAILURE WITH PRESERVED EJECTION FRACTION (H): ICD-10-CM

## 2022-09-12 DIAGNOSIS — D50.9 IRON DEFICIENCY ANEMIA, UNSPECIFIED IRON DEFICIENCY ANEMIA TYPE: ICD-10-CM

## 2022-09-13 RX ORDER — FERROUS SULFATE 325(65) MG
TABLET ORAL
Qty: 90 TABLET | Refills: 0 | Status: ON HOLD | OUTPATIENT
Start: 2022-09-13 | End: 2023-03-09

## 2022-09-13 NOTE — TELEPHONE ENCOUNTER
Prescription approved per [facility] Refill Protocol.  Sophia Hemphill RN

## 2022-09-15 RX ORDER — POTASSIUM CHLORIDE 1500 MG/1
TABLET, EXTENDED RELEASE ORAL
Qty: 270 TABLET | Refills: 3 | Status: SHIPPED | OUTPATIENT
Start: 2022-09-15 | End: 2023-10-02

## 2022-09-15 NOTE — PROGRESS NOTES
Called pt to follow-up on Novolog decrease and hypoglycemia - since reducing her dose she has not seen any other BG that are in the 60-70 range. States that most blood sugars are remaining between 100 and 150.     Will follow-up with pt in October as previously planned.     Sabrina Meek, MarcosD, JAMES, BCACP  MTM Pharmacist, [facility]

## 2022-09-16 ENCOUNTER — MYC MEDICAL ADVICE (OUTPATIENT)
Dept: GASTROENTEROLOGY | Facility: CLINIC | Age: 82
End: 2022-09-16

## 2022-09-19 ENCOUNTER — OFFICE VISIT (OUTPATIENT)
Dept: CARDIOLOGY | Facility: CLINIC | Age: 82
End: 2022-09-19
Attending: NURSE PRACTITIONER
Payer: MEDICARE

## 2022-09-19 VITALS
HEIGHT: 67 IN | HEART RATE: 57 BPM | BODY MASS INDEX: 29.81 KG/M2 | SYSTOLIC BLOOD PRESSURE: 121 MMHG | OXYGEN SATURATION: 96 % | DIASTOLIC BLOOD PRESSURE: 71 MMHG | WEIGHT: 189.9 LBS

## 2022-09-19 DIAGNOSIS — M16.11 OSTEOARTHRITIS OF RIGHT HIP, UNSPECIFIED OSTEOARTHRITIS TYPE: ICD-10-CM

## 2022-09-19 DIAGNOSIS — I48.19 PERSISTENT ATRIAL FIBRILLATION (H): Primary | ICD-10-CM

## 2022-09-19 DIAGNOSIS — G89.29 OTHER CHRONIC PAIN: ICD-10-CM

## 2022-09-19 DIAGNOSIS — Z01.818 PRE-OP EXAM: Primary | ICD-10-CM

## 2022-09-19 DIAGNOSIS — I48.0 PAROXYSMAL ATRIAL FIBRILLATION (H): ICD-10-CM

## 2022-09-19 PROCEDURE — 93005 ELECTROCARDIOGRAM TRACING: CPT

## 2022-09-19 PROCEDURE — G0463 HOSPITAL OUTPT CLINIC VISIT: HCPCS | Mod: 25

## 2022-09-19 PROCEDURE — 99214 OFFICE O/P EST MOD 30 MIN: CPT | Performed by: NURSE PRACTITIONER

## 2022-09-19 RX ORDER — CLINDAMYCIN PHOSPHATE 900 MG/50ML
900 INJECTION, SOLUTION INTRAVENOUS
Status: CANCELLED | OUTPATIENT
Start: 2022-09-19

## 2022-09-19 RX ORDER — SODIUM CHLORIDE 9 MG/ML
1000 INJECTION, SOLUTION INTRAVENOUS CONTINUOUS
Status: CANCELLED | OUTPATIENT
Start: 2022-09-19

## 2022-09-19 RX ORDER — POTASSIUM CHLORIDE 1500 MG/1
20 TABLET, EXTENDED RELEASE ORAL
Status: CANCELLED | OUTPATIENT
Start: 2022-09-19

## 2022-09-19 RX ORDER — POTASSIUM CHLORIDE 1500 MG/1
40 TABLET, EXTENDED RELEASE ORAL
Status: CANCELLED | OUTPATIENT
Start: 2022-09-19

## 2022-09-19 RX ORDER — LIDOCAINE 40 MG/G
CREAM TOPICAL
Status: CANCELLED | OUTPATIENT
Start: 2022-09-19

## 2022-09-19 ASSESSMENT — PAIN SCALES - GENERAL: PAINLEVEL: NO PAIN (0)

## 2022-09-19 NOTE — TELEPHONE ENCOUNTER
Pt requesting the following med when rescheduling appt with RN:    HYDROcodone-acetaminophen (NORCO) 7.5-325 MG per tablet      Will route to MA pool for assistance with gathering opioid refill information.    Ilene SHERIDAN RN Care Coordinator  [facility]

## 2022-09-19 NOTE — PROGRESS NOTES
Date: 9/19/2022    Time of Call: 7:36 AM     Diagnosis:  Afib     [ TORB ] Ordering provider: Marina Soler CNP  Order: Labs, Watchmeseret pre procedure order set     Order received by: Dyan Rizo RN     Follow-up/additional notes:

## 2022-09-19 NOTE — PATIENT INSTRUCTIONS
Check in to the hospital Admissions/3C at 5:30am.     Nothing to eat after midnight; water, apple juice or 7up/Sprite is OK up to two hours prior to your scheduled procedure.  You may take your medications in the morning with a sip of water.     Take a shower, with antibacterial soap/wipes, the night before the procedure, and the morning of the procedure.        Diagnosis: Atrial Fibrillation  Plan:  1. Watchman procedure scheduled for 9/26/22.       2. Medication Recommendations:  -- Last dose of Rivaroxaban on Friday, 9/23.  Holding on 9/24, 9/25 and 9/26.  -- Take Aspirin 325 mg the day before and the morning of your Watchman procedure.  -- Hold your Lasix and Torsemide the morning of your Watchman.  -- Hold all supplements the mornring of.  -- Antidiabetics: Hold oral antidiabetics and short acting insulin. Take 1/2 dose lantus.  -- Continue metoprolol perioperatively without interruption        If any questions please contact:  Dyan Rizo RN  [facility]  TAVR, Mitra[facility] and Watchman Programs  [facility]  [phone]

## 2022-09-19 NOTE — PROGRESS NOTES
Check in to the hospital Admissions/3C at 6:300am.    Nothing to eat after midnight; water, apple juice or 7up/Sprite is OK up to two hours prior to your scheduled procedure.  You may take your medications in the morning with a sip of water.    Take a shower, with antibacterial soap/wipes, the night before the procedure, and the morning of the procedure.      Diagnosis: Atrial Fibrillation  Plan:  1. Watchman procedure scheduled for 9/26/22.      2. Medications instructions:  -- Last dose of Rivaroxaban on Friday, September 23. Holding on 9/24, 9/25 and 9/26.  -- Take Aspirin 325 mg the day before and the morning of your Watchman procedure.  -- Hold your Torsemide the morning of your Watchman.  -- Hold all supplements the mornring of.  -- No short acting insulin the morning of and only a half dose of long acting.      If any questions please contact:  Dyan Rizo RN  [facility]  TAVR, MitraClip and Watchman Programs  [facility]  [phone]

## 2022-09-19 NOTE — NURSING NOTE
Chief Complaint   Patient presents with     Follow Up     Watchman H&P       Vitals were taken and medications were reconciled. EKG was performed   BRIAN Plaza  2:16 PM

## 2022-09-19 NOTE — LETTER
9/19/2022      RE: Betty Tee  [address]  [facility] [address]       Dear Colleague,    Thank you for the opportunity to participate in the care of your patient, Betty Tee, at the [facility] [place] at [facility]. Please see a copy of my visit note below.      STRUCTURAL HEART CLINIC  H&P    Referring Provider: Dr. Crews  Primary Cardiologist: Dr. Rosa     History of Present Illness  Betty Tee is a 82 year old female who presents for pre-operative H&P in preparation for Watchman left atrial appendage closure device on 9/26/22 at [facility].     Patient has a history of paroxysmal atrial fibrillation on Xarelto with [id number] of 6 (CHF, HTN, age, DM, female) and HAS BLED of 5 (HTN, CKD, age, prior bleed, meds) with intolerance to anticoagulation due to recurrent GI bleeding. She has had 3 EGDs and 1 colonoscopy in the past 6 months due to recurrent GIB and anemia. 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 She was seen in structural clinic where she was deemed an appropriate Watchman candidate. She was counseled for the above procedure.     Her remaining medical history is also notable for HFpEF, interstitial lung disease, Sjogren's syndrome, HTN, type II DM, severe hip OA mainly right, chronic pain, diverticulitis s/p colectomy 2011 with subsequent takedown.     Has been stable from a cardiac standpoint. No recent bleeding events. No infectious symptoms. She is struggling with severe right hip pain and is in need of hip replacement but can not be done until after the Watchman.      History of blood transfusions/reactions: No   History of abnormal bleeding/anti-platelet use: Yes history of bleeding   Steroid use in the last year: Yes on daily for Sjogren's  Personal or family history with difficulty with anesthesia: No   Infection precautions: No  Difficulty w/bedrest: Possibly severe hip  arthritis     Current Medications:  Current Outpatient Medications   Medication Sig Dispense Refill     ACCU-CHEK SHANNON PLUS test strip USE TO TEST BLOOD SUGARS 3 TIMES DAILY 300 strip 5     acetaminophen (TYLENOL) 500 MG tablet Take 1,000 mg by mouth every 8 hours as needed (max 6 tablets/24 hours, 2 tablets/dose)  (Patient not taking: Reported on 9/6/2022)       acyclovir (ZOVIRAX) 400 MG tablet Take 1 tablet (400 mg) by mouth 3 times daily For a couple days 15 tablet 2     acyclovir (ZOVIRAX) 5 % external ointment Apply topically 6 times daily As needed for outbreaks 15 g 3     albuterol (PROAIR HFA/PROVENTIL HFA/VENTOLIN HFA) 108 (90 Base) MCG/ACT inhaler Inhale 2 puffs into the lungs every 6 hours 18 g 11     alendronate (FOSAMAX) 70 MG tablet Take 1 tablet (70 mg) by mouth every 7 days 12 tablet 0     anakinra (KINERET) 100 MG/0.67ML SOSY injection 100 mg every day x 3 days as needed for flare ups 6.7 mL 3     atorvastatin (LIPITOR) 10 MG tablet TAKE 1/2 TABLET BY MOUTH EVERY DAY 45 tablet 2     azithromycin (ZITHROMAX) 250 MG tablet TAKE 1 TABLET BY MOUTH EVERY DAY 30 tablet 5     BD PEN NEEDLE JANE 2ND GEN 32G X 4 MM miscellaneous USE 4 DAILY AS DIRECTED. 400 each 1     blood glucose (NO BRAND SPECIFIED) lancets standard Use to test blood sugar 3 times daily or as directed 100 lancet 3     cevimeline (EVOXAC) 30 MG capsule Take 1 capsule (30 mg) by mouth 3 times daily (Patient taking differently: Take 30 mg by mouth twice a week On Tuesdays and Fridays) 270 capsule 2     Cholecalciferol (VITAMIN D3) 50 MCG (2000 UT) CAPS TAKE 100 MCG BY MOUTH DAILY (TAKE 2 TABLET (50 MCG) BY MOUTH DAILY - ORAL) 90 capsule 1     COMPOUNDED NON-CONTROLLED SUBSTANCE (CMPD RX) - PHARMACY TO MIX COMPOUNDED MEDICATION Estriol 1 mg/g Apply small amount to finger and apply to inside vagina daily for 2 weeks then twice weekly Route: vaginally Dispense 30 grams 11 refills (Patient taking differently: Estriol 1 mg/g Apply small amount  to finger and apply to inside vagina twice weekly Route: vaginally Dispense 30 grams 11 refills) 30 g 11     cyanocobalamin (VITAMIN B-12) 1000 MCG tablet Take 1 tablet (1,000 mcg) by mouth daily 30 tablet 1     escitalopram (LEXAPRO) 20 MG tablet Take 1 tablet (20 mg) by mouth daily 90 tablet 0     ferrous sulfate (FEROSUL) 325 (65 Fe) MG tablet TAKE 1 TABLET BY MOUTH EVERY DAY WITH BREAKFAST 90 tablet 0     fluticasone (FLONASE) 50 MCG/ACT nasal spray SPRAY 1-2 SPRAYS INTO BOTH NOSTRILS DAILY AS NEEDED FOR ALLERGIES 16 mL 11     fluticasone-vilanterol (BREO ELLIPTA) 100-25 MCG/INH inhaler Inhale 1 puff into the lungs daily 1 each 11     HYDROcodone-acetaminophen (NORCO) 7.5-325 MG per tablet Take 1 tablet by mouth every 12 hours OK to fill and start 08/12/22 60 tablet 0     hydroxychloroquine (PLAQUENIL) 200 MG tablet Take 2 tablets (400 mg) by mouth daily Get annual eye exams for hydroxychloroquine (Plaquenil) monitoring and fax to [phone] 180 tablet 3     insulin glargine (BASAGLAR KWIKPEN) 100 UNIT/ML pen INJECT 22 UNITS SUBCUTANEOUS DAILY (TO REPLACE LANTUS) (Patient taking differently: INJECT 24 UNITS SUBCUTANEOUS DAILY (TO REPLACE LANTUS)) 15 mL 1     ketoconazole (NIZORAL) 2 % external cream Apply topically 2 times daily as needed for itching 60 g 1     KLOR-CON 20 MEQ CR tablet TAKE 2 TABLETS (40 MEQ) BY MOUTH EVERY MORNING AND 1 TABLET (20 MEQ) EVERY EVENING. 270 tablet 3     lidocaine (LIDODERM) 5 % patch APPLY PATCH TO PAINFUL AREA FOR UP TO 12 H WITHIN A 24 H PERIOD. REMOVE AFTER 12 HOURS. (Patient not taking: Reported on 9/6/2022) 30 patch 2     loratadine (CLARITIN) 10 MG tablet TAKE 1 TABLET BY MOUTH EVERY DAY 90 tablet 3     methocarbamol (ROBAXIN) 750 MG tablet Take 1 tablet (750 mg) by mouth 3 times daily as needed for muscle spasms (Patient not taking: Reported on 9/6/2022) 90 tablet 3     metoprolol succinate ER (TOPROL-XL) 50 MG 24 hr tablet Take 1 tablet (50 mg) by mouth 2 times daily 90  tablet 3     montelukast (SINGULAIR) 10 MG tablet TAKE 1 TABLET BY MOUTH EVERYDAY AT BEDTIME 90 tablet 0     NOVOLOG FLEXPEN 100 UNIT/ML soln INJECT 12 UNITS SUBCUTANEOUS 3 TIMES DAILY (WITH MEALS) (Patient taking differently: 11 Units) 15 mL 1     OZEMPIC, 1 MG/DOSE, 4 MG/3ML SOPN INJECT 1 MG SUBCUTANEOUS ONCE A WEEK 3 mL 2     pantoprazole (PROTONIX) 40 MG EC tablet TAKE 1 TABLET (40 MG) BY MOUTH DAILY (REPLACES FAMOTIDINE- SHOULD STOP TAKING FAMOTIDINE) 90 tablet 0     predniSONE (DELTASONE) 5 MG tablet Take 1 tablet (5 mg) by mouth daily 90 tablet 1     rivaroxaban ANTICOAGULANT (XARELTO ANTICOAGULANT) 20 MG TABS tablet TAKE 1 TABLET BY MOUTH DAILY WITH DINNER 90 tablet 3     spironolactone (ALDACTONE) 25 MG tablet Take 2 tablets (50 mg) by mouth daily 180 tablet 3     torsemide (DEMADEX) 10 MG tablet TAKE ONE TAB (10 MG) WITH ONE 20 MG TAB TO = 30 MG DAILY IN AFTERNOON. 90 tablet 3     torsemide (DEMADEX) 20 MG tablet TAKE 2 TABLETS (40 MG) BY MOUTH EVERY MORNING AND 1 TABLET (20 MG) DAILY AT 2 PM. TAKE THE AFTERNOON DOSE WITH AN EXTRA 10 MG TAB FOR A TOTAL OF 30 MG IN THE AFTERNOON 270 tablet 3     traZODone (DESYREL) 50 MG tablet Take 3 tablets (150 mg) by mouth nightly as needed for sleep 135 tablet 1       Allergies:     Allergies   Allergen Reactions     Amoxicillin-Pot Clavulanate      Augmentin Nausea and Vomiting     Codeine Nausea and Vomiting     Codeine      PN: LW Reaction: HIVES     Penicillins Nausea and Vomiting     PN: LW Reaction: GI Upset     Phenobarbital Itching     Phenobarbital      Seasonal Allergies        Past Medical History:  Past Medical History:   Diagnosis Date     Alcohol abuse, in remission      Allergic rhinitis, cause unspecified     allegra helps when she takes it     Antiplatelet or antithrombotic long-term use      Atrial fibrillation (H)     in hosp in 11/11 after surgery w/ fluid overload     Cardiomegaly     LVH on stress echo- cardiac w/u at [facility]- neg CT scan for PE,  neg stress echo in 8/06     Chest pain, unspecified      Congestive heart failure (H)      Depressive disorder      Diabetes (H)      Disorder of bone and cartilage, unspecified     osteopenia (had been on prempro), improved on 6/06 dexa, stable dexa 11/10     Diverticulosis of colon (without mention of hemorrhage)     last episode yrs ago     Essential hypertension, benign      Follicular bronchiolitis (H)     associated with Sjogrens, dx by chest CT showing mosaic attenuation and air trapping     Gastro-oesophageal reflux disease      ILD (interstitial lung disease) (H)     associated with Sjogrens, also has mildly elevated IgG4, first noted on chest CT 2015 (mild changes) and also has small airways disease; ILD improved on follow up chest CT 2018.     Insomnia, unspecified     weaned off clonazepam     Irregular heart beat      Lumbago 7/09    MRI with DJD, now seeing Dr. Cain for sciatic sx's     Major depressive disorder, recurrent episode, moderate (H)      Obstructive sleep apnea     uses cpap     Osteoarthrosis, unspecified whether generalized or localized, unspecified site      Sjogren's syndrome (H)     + GR and SSA and lip bx     Sleep apnea      Tobacco use disorder     chantix in 9/07, started again in 6/08, working       Past Surgical History:  Past Surgical History:   Procedure Laterality Date     APPENDECTOMY       BACK SURGERY  1962     BACK SURGERY  1962     BIOPSY BREAST  09/27/2002    Biopsy Left Breast     BIOPSY BREAST Left 09/27/2002     BREAST SURGERY       CARDIAC SURGERY       CARDIAC SURGERY       CHOLECYSTECTOMY  1990's?     CHOLECYSTECTOMY       COLECTOMY LEFT  11/07/2011    Procedure:COLECTOMY LEFT; Laparoscopic mobilization of splenic flexture, sigmoid colectomy, coloprotoscopy, loop illeostomy; Surgeon:CK CASTANEDA; Location:[facility] OR     COLECTOMY LEFT  11/07/2011     COLONOSCOPY  1990,s     COLONOSCOPY N/A 5/3/2022    Procedure: COLONOSCOPY;  Surgeon: Guru Winsome Dias  MD Donna;  Location: [facility] GI     ENT SURGERY       EYE SURGERY  2012     FLEXIBLE SIGMOIDOSCOPY  11/03/2011     HYSTERECTOMY TOTAL ABDOMINAL, BILATERAL SALPINGO-OOPHORECTOMY, COMBINED  11/07/2011    Procedure:COMBINED HYSTERECTOMY TOTAL ABDOMINAL, BILATERAL SALPINGO-OOPHORECTOMY; total abdominal hysterectomy, bilateral salpingo-oophorectomy; Surgeon:ALETA MANUEL; Location:[facility] OR     HYSTERECTOMY TOTAL ABDOMINAL, BILATERAL SALPINGO-OOPHORECTOMY, COMBINED  11/07/2011     ILEOSTOMY  02/01/2012    takedown loop ileostomy      INSERT STENT URETER  11/07/2011    Procedure:INSERT STENT URETER; Placement of Bilateral Ureteral Stents ; Surgeon:PRANEETH BRYANT; Location:[facility] OR     SIGMOIDECTOMY  02/01/2012    Dr. Siddiqi, Sigmoidectomy for diverticular abscess, iliostomy afterwards until repair     SIGMOIDOSCOPY FLEXIBLE  11/03/2011    Procedure:SIGMOIDOSCOPY FLEXIBLE; Flexible Sigmoidoscopy; Surgeon:CK SIDDIQI; Location:[facility] OR     TAKEDOWN ILEOSTOMY  02/01/2012    Procedure:TAKEDOWN ILEOSTOMY; Takedown Loop Ileostomy ; Surgeon:CK SIDDIQI; Location:[facility] OR     URETERAL STENT PLACEMENT  11/07/2011     ZZC APPENDECTOMY  1970's?     ZZC NONSPECIFIC PROCEDURE  11/2005    exploratory abd lap, adhesions, resolved RLQ pain, diverticulitis episodes       Family History:  Family History   Problem Relation Age of Onset     C.A.D. Mother 63        MI- first at age 63     Heart Disease Mother      Hypertension Mother      Cerebrovascular Disease Mother      Hyperlipidemia Mother      Coronary Artery Disease Mother         MI-first at age 63     Other - See Comments Mother         cerebrovascular disease      Alcohol/Drug Father      Alzheimer Disease Father      Dementia Father      Hypertension Father      Hyperlipidemia Father      Substance Abuse Father      Diabetes Sister      Hypertension Sister      Hyperlipidemia Sister      Substance Abuse Sister      Asthma Sister      C.A.D. Sister 52        Minor MI- age 50's      Heart Disease Sister      Hypertension Sister      Hypertension Sister      Cancer - colorectal Sister 48        Late 40's early 50's     Gastrointestinal Disease Sister         Diverticulitis     Lipids Sister      Lipids Sister      Diabetes Sister      Heart Disease Sister         CHF     Cancer Sister         lung, smoker     Substance Abuse Sister      Asthma Sister      Cancer Sister      Coronary Artery Disease Sister         minor MI-age 50's     Heart Disease Sister      Hypertension Sister      Hypertension Sister      Colon Cancer Sister      Diverticulitis Sister      Lung Cancer Sister      Heart Disease Sister         CHF     Diabetes Sister      Asthma Sister      Hypertension Brother      Prostate Cancer Brother 74        Dx'd age 74     Gastrointestinal Disease Brother         Diverticulitis     Parkinsonism Brother      Substance Abuse Brother      Prostate Cancer Brother      Hyperlipidemia Brother      Hypertension Brother      Prostate Cancer Brother      Diverticulitis Brother      Parkinsonism Brother      Breast Cancer Daughter      Breast Cancer Daughter      Diabetes Other      Hypertension Other      Anesthesia Reaction No family hx of      Deep Vein Thrombosis (DVT) No family hx of        Social History:  Social History     Socioeconomic History     Marital status: Single     Number of children: 0     Years of education: Ed Spec De   Occupational History     Occupation: Professor     Employer: [employer]     Comment: [employer]- Education   Tobacco Use     Smoking status: Former Smoker     Packs/day: 0.50     Years: 10.00     Pack years: 5.00     Types: Cigarettes     Quit date: 2011     Years since quittin.1     Smokeless tobacco: Never Used     Tobacco comment:  ppd   Substance and Sexual Activity     Alcohol use: No     Alcohol/week: 0.0 standard drinks     Comment: In recovery beginning      Drug use: No     Sexual activity: Never  "  Other Topics Concern     Parent/sibling w/ CABG, MI or angioplasty before 65F 55M? Yes   Social History Narrative                 Review of Systems:  Functional status: Independent in ADL's. Unable to achieve METS > 4 due to hip pain.     Constitutional: No fever, chills, or sweats. No weight gain/loss   ENT: No visual disturbance, ear ache, epistaxis, sore throat  Allergies/Immunologic: Negative.   Respiratory: No cough, hemoptysia  Cardiovascular: As per HPI  GI: No nausea, vomiting, hematemesis, melena, or hematochezia  : No urinary frequency, dysuria, or hematuria  Integument: Negative  Psychiatric: Negative  Neuro: Negative  Endocrinology: Negative   Musculoskeletal: Negative      Physical Exam:  Vitals: /71 (BP Location: Left arm, Patient Position: Sitting, Cuff Size: Adult Regular)   Pulse 57   Ht 1.702 m (5' 7\")   Wt 86.1 kg (189 lb 14.4 oz)   SpO2 96%   BMI 29.74 kg/m     General: NAD  HEENT:  Dentition intact.    Neck: No jugular venous distension.   Heart: S1S2  Lungs: CTA.    Abdomen: Soft, nontender, nondistended.   Extremities: No clubbing, cyanosis, or edema.  The pulses are +4/4 at the radial, brachial, femoral, popliteal, DP, and PT sites bilaterally.  No bruits are noted.  Neurologic: Alert and oriented to person/place/time, normal speech, gait and affect  Skin: No petechiae, purpura or rash.      Diagnostic Studies:      ECG 9/19/22:  Personally reviewed and interpreted by me.  SB first degree AVB    Cardiac CT 8/5/22:                                                                   IMPRESSION:  1.  The left atrial appendage depth is: 25.0 mm.  2.  The left atrial appendage landing zone maximal diameter is 21.2 mm  and the minimal diameter is 18.7 mm; the landing zone circumference is   62.7 mm.   3.  The left atrial appendage is free of thrombus.      FINDINGS:   1.  The left atrial appendage has a  mixed (windsock/chicken wing),  predominantly windsock morphology.  2.  The left " atrial appendage depth is: 25.0 mm.  3.  The left atrial appendage landing zone average diameter is 19.3 mm  (21.2 mm x 18.7 mm).  4.  The left atrial appendage landing zone perimeter is  62.7 mm.  5.  Normal pulmonary venous anatomy with all pulmonary veins draining  into the left atrium.  6.  Coronary images are non-diagnostic for stenosis/atherosclerosis  due to cardiac motion artifact.  There are mild to moderate coronary  artery calcifications.  7.  The visualized aortic root, ascending aorta and descending  thoracic aorta are of normal caliber with mild to moderate  calcification of descending aorta.  8.  Please review the separate Radiology report from the original  study site and date for incidental noncardiac findings.     I have personally reviewed the examination and initial interpretation  and I agree with the findings.     JAMES CRAWFORD MD     Laboratory Studies:  Personally reviewed and interpreted by me.    LIPID RESULTS:  Lab Results   Component Value Date    CHOL 104 12/09/2021    CHOL 115 10/20/2020    HDL 59 12/09/2021    HDL 71 10/20/2020    LDL 16 12/09/2021    LDL 19 10/20/2020    TRIG 145 12/09/2021    TRIG 125 10/20/2020    CHOLHDLRATIO 2.5 08/06/2014       LIVER ENZYME RESULTS:  Lab Results   Component Value Date    AST 13 08/23/2022    AST 17 06/04/2021    ALT 14 08/23/2022    ALT 25 06/04/2021       CBC RESULTS:  Lab Results   Component Value Date    WBC 7.6 08/23/2022    WBC 8.6 06/04/2021    RBC 4.03 08/23/2022    RBC 4.46 06/04/2021    HGB 12.3 08/23/2022    HGB 13.4 06/04/2021    HCT 39.0 08/23/2022    HCT 42.1 06/04/2021    MCV 97 08/23/2022    MCV 94 06/04/2021    MCH 30.5 08/23/2022    MCH 30.0 06/04/2021    MCHC 31.5 08/23/2022    MCHC 31.8 06/04/2021    RDW 14.9 08/23/2022    RDW 15.5 (H) 06/04/2021     08/23/2022     06/04/2021       BMP RESULTS:  Lab Results   Component Value Date     08/23/2022     06/04/2021    POTASSIUM 4.1 08/23/2022     POTASSIUM 4.0 06/04/2021    CHLORIDE 107 08/23/2022    CHLORIDE 106 06/04/2021    CO2 22 08/23/2022    CO2 25 06/04/2021    ANIONGAP 8 08/23/2022    ANIONGAP 6 06/04/2021     (H) 08/23/2022     (H) 06/04/2021    BUN 17 08/23/2022    BUN 14 06/04/2021    CR 1.08 (H) 08/23/2022    CR 1.11 (H) 06/04/2021    GFRESTIMATED 51 (L) 08/23/2022    GFRESTIMATED 47 (L) 06/04/2021    GFRESTBLACK 54 (L) 06/04/2021    CLAUDY 8.6 08/23/2022    CLAUDY 8.8 06/04/2021        A1C RESULTS:  Lab Results   Component Value Date    A1C 6.4 (H) 08/23/2022    A1C 7.1 (H) 06/04/2021       INR RESULTS:  Lab Results   Component Value Date    INR 1.36 (H) 03/03/2020    INR 2.37 (H) 02/14/2019       Assessment and Plan     Betty Tee is a 82 year old female who presents for pre-operative H&P in preparation for Watchman left atrial appendage closure device on 9/26/22 at [facility].     She has the following specific operative considerations:      - RCRI score 2 corresponding with a 2.4% perioperative risk of MACE      - ANGELICA #: known ANGELICA       - VTE risk = 8. If equal to or > 4, pharmacologic prophylaxis is indicated      - Risk of PONV score = 3. If > 2, anti-emetic intervention recommended    1. Paroxysmal a-fib with intolerance to AC:   2. Recurrent GI bleeding secondary to GAVE:  Patient has a history of recurrent GIB and anemia with several EGDs and colonoscopies over the past 6 months. 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 She was referred to structural clinic by her GI team and was deemed an appropriate Watchman candidate. She was counseled for the above procedure. We reviewed pre and post procedure expectations, follow-up instructions, risks and benefits of the procedure. Patient wishes to proceed.    Hold Xarelto 48 hours pre procedure  Continue metoprolol DOS  Continue protonix for GI protection  All questions answered  Type and screen orders complete  COVID test ordered  Supplies for scrubbing  provided  No known contrast dye allergy  No trouble with anesthesia in the past  Pre procedure labs pending, no s/s of infection    3. Chronic diastolic heart failure: Currently appears euvolemic, maintained on torsemide 40 mg a.m./30 mg p.m and spironolactone 50 mg daily, hold both DOS.    4. Hypertension: Currently on sprionolactone, hold DOS.     5. Hyperlipidemia: Continue atorvastatin DOS.    6. Type II DM: Insulin dependent with novolog and lantus. Also on Ozempic. Hold oral antidiabetics and short acting insulin morning of procedure. Take 1/2 dose lantus.    7. Severe right hip pain 2/2 OA: Patient in severe pain, recommend continuing current norco regimen and will arrange urgent appointment with Dr. Alba her pain management specialist. She will return to Dr. Shannon post Watchman, will be okay to hold AC and undergo right hip replacement 45 days post watchman.     8. PMR: Continue daily prednisone, okay to take DOS    9. Sjogrens: Continue Cevimeline for dry mouth    10. Gout: PRN anakinra for gout    11. ANGELICA: Continue CPAP    Medication Recommendations:  Diuretic: hold lasix and tammy DOS  Antiplatelet: Start  mg night before and morning of procedure  Coumadin/NOAC: Hold Xarelto 48 hours pre-procedure, last dose 9/23, holding 9/24-9/26  BB: Continue metoprolol perioperatively without interruption  Antidiabetics: Hold oral antidiabetics and short acting insulin. Take 1/2 dose lantus.  Supplements: Hold morning of procedure    Patient is optimized and is acceptable candidate for the proposed procedure.  No further diagnostic evaluation is needed. Pre-procedure instructions provided in written format.     LIZY Gonzalez, CNP  Structural Heart Care Nurse Practitioner  [facility]  Clinic: [phone]  Pager: [phone]

## 2022-09-20 LAB
ATRIAL RATE - MUSE: 58 BPM
DIASTOLIC BLOOD PRESSURE - MUSE: NORMAL MMHG
INTERPRETATION ECG - MUSE: NORMAL
P AXIS - MUSE: NORMAL DEGREES
PR INTERVAL - MUSE: 234 MS
QRS DURATION - MUSE: 88 MS
QT - MUSE: 474 MS
QTC - MUSE: 465 MS
R AXIS - MUSE: 22 DEGREES
SYSTOLIC BLOOD PRESSURE - MUSE: NORMAL MMHG
T AXIS - MUSE: 53 DEGREES
VENTRICULAR RATE- MUSE: 58 BPM

## 2022-09-20 NOTE — TELEPHONE ENCOUNTER
Routing to provider to review medication prepped per below    HYDROcodone-acetaminophen (NORCO) 7.5-325 MG per tablet #60 Refill:0  Sig:Take 1 tablet by mouth every 12 hours   Last picked up 8/16/22 with start on 8/16/22 (Per , MA prep is incorrect)  Due: OK to fill and start 9/20/22    Per last OV note 8/24/22: Medication Management: Continue Norco 7.5-325mg BID prn.    [facility]/pharmacy #[id number]  [address] [address]  Phone: [phone] Fax: [phone]    Ilene SHERIDAN RN Care Coordinator  [facility]

## 2022-09-20 NOTE — TELEPHONE ENCOUNTER
Patient requesting refill(s) of HYDROcodone-acetaminophen (NORCO) 7.5-325 MG per tablet    Last dispensed from pharmacy on 8/12/22    Patient's last office/virtual visit by prescribing provider on 8/24/22  Next office/virtual appointment scheduled for 9/21/22    Last urine drug screen date 9/2/21  Current opioid agreement on file (completed within the last year) Yes Date of opioid agreement: 10/04/21    E-prescribe to [facility]/pharmacy #[id number] - [facility]     Will route to nursing [place] for review and preparation of prescription(s).

## 2022-09-21 ENCOUNTER — OFFICE VISIT (OUTPATIENT)
Dept: PALLIATIVE MEDICINE | Facility: CLINIC | Age: 82
End: 2022-09-21
Payer: MEDICARE

## 2022-09-21 VITALS — HEART RATE: 51 BPM | SYSTOLIC BLOOD PRESSURE: 105 MMHG | OXYGEN SATURATION: 96 % | DIASTOLIC BLOOD PRESSURE: 55 MMHG

## 2022-09-21 DIAGNOSIS — M16.11 OSTEOARTHRITIS OF RIGHT HIP, UNSPECIFIED OSTEOARTHRITIS TYPE: Primary | ICD-10-CM

## 2022-09-21 DIAGNOSIS — M25.551 CHRONIC PAIN OF RIGHT HIP: ICD-10-CM

## 2022-09-21 DIAGNOSIS — Z79.891 ENCOUNTER FOR LONG-TERM OPIATE ANALGESIC USE: ICD-10-CM

## 2022-09-21 DIAGNOSIS — G89.29 CHRONIC PAIN OF RIGHT HIP: ICD-10-CM

## 2022-09-21 PROCEDURE — 99213 OFFICE O/P EST LOW 20 MIN: CPT | Performed by: PHYSICAL MEDICINE & REHABILITATION

## 2022-09-21 RX ORDER — OXYCODONE AND ACETAMINOPHEN 5; 325 MG/1; MG/1
1 TABLET ORAL 2 TIMES DAILY PRN
Qty: 20 TABLET | Refills: 0 | Status: SHIPPED | OUTPATIENT
Start: 2022-09-21 | End: 2023-03-16

## 2022-09-21 ASSESSMENT — PAIN SCALES - GENERAL: PAINLEVEL: NO PAIN (0)

## 2022-09-21 NOTE — PROGRESS NOTES
Patient presents to the clinic today for a follow up with Alanna Alba MD regarding Pain Management.            UDT 09.02.2021   CSA 10.04.2021      Medications-  Norco 09.20.2022 4pm      QUESTIONS:              Emilia TRAN [facility] Visit Facilitator

## 2022-09-21 NOTE — PATIENT INSTRUCTIONS
Stop Norco. Start Percocet 5-325 mg tablets. You can take this twice daily as needed.   If it is helpful to use the wheelchair for longer distances continue to do that.   Send a my chart message in the next 2 weeks letting me know how the oxycodone is working. If working well I will send in prescription for one month. If not we can resume Norco.   Follow up with me in 3 months.   Updated urine drug screen and controlled substance agreement today.     Alanna Alba MD  [facility]     ----------------------------------------------------------------  Clinic Number:  [phone]   Call with any questions about your care and for scheduling assistance.   Calls are returned Monday through Friday between 8 AM and 4:30 PM. We usually get back to you within 2 business days depending on the issue/request.    If we are prescribing your medications:  For opioid medication refills, call the clinic or send a Floqq message 7 days in advance.  Please include:  Name of requested medication  Name of the pharmacy.  For non-opioid medications, call your pharmacy directly to request a refill. Please allow 3-4 days to be processed.   Per [place] State Law:  All controlled substance prescriptions must be filled within 30 days of being written.    For those controlled substances allowing refills, pickup must occur within 30 days of last fill.      We believe regular attendance is key to your success in our program!    Any time you are unable to keep your appointment we ask that you call us at least 24 hours in advance to cancel.This will allow us to offer the appointment time to another patient.   Multiple missed appointments may lead to dismissal from the clinic.

## 2022-09-21 NOTE — PROGRESS NOTES
Date of visit: 8/24/2022    Last seen by me on 7/6/2022.     Assessment:  Sister Betty Buckner is a 82 year old medically complex patient with past medical history including: Atrial fibrillation, VLH, History of DVT, CHF, HLD, HTN, DM 2, Stage 3 CKD, RLS, Depression, History of etoh abuse (remission 30+ years) with the following chronic pain conditions:     1. Right sided hip and leg pain: Betty reports a long standing history of right hip and leg pain. They've been having worsening right lateral hip and groin pain. They also have a long standing history of pain that starts in her right low back/upper buttock and radiates laterally and posteriorly down the right leg to the foot. Initially was getting improvement with piriformis injection but declining benefit over time. Significant but short term improvement noted with hip injection x 2. Appears that their hip pain is likely due to a combination of right sided gluteal weakness/dysfunction and severe right hip OA.    Plan:  1. Physical Therapy: on hold for now. Consider getting access to a pool to do some gentle exercises.   2. Clinical Health Pain Psychologist: Coping well defer.  3. Diagnostic Studies: None at this time.  4. Medication Management:   1. Continue Norco 7.5-325mg BID prn. Discussed she should try to utilize this more regularly to see if it provides benefit.   2. Continue methocarbamol 750mg TID prn.   3. tylenol 1000mg TID prn   4. CSA/UDS - 9/2/21  5. Further procedures recommended: None at this time  6. Follow up: 2-3 months      Chief complaint:   Chief Complaint   Patient presents with     Pain     Since her last visit, Betty VILLALOBOS Randal reports:  - She had a repeat right hip injection on 7/22/2022. This provided about 1 week of pain relief before pain returned to baseline.   - Pain continues to be worse with walking. Ok when sitting.   - Still not using Norco regularly.   - planning on having hip replacement surgery but will need to wait  until she has a cardiac procedure completed first.      Pain scores:  Pain intensity on average is 6 on a scale of 0-10.     Medications:       Current pain medications:  -Tylenol 1000mg q8h prn - Usually takes 1-2 times a day  -Escitalopram 20mg daily              -Norco 7.5-325mg prn - takes it but not regularly              -methocarbamol 750mg tid prn              -Voltaren 1% gel qid prn      Current calculated MME: 15         Previous pain medications:  -Tramadol 50mg prn  -Tizanidine, flexeril - nh              -Gabapentin 300mg TID - memory difficulty    Past pain treatments:  Physical Therapy: hasn't completed PT for low back or hip pain                TENS unit: hasn't tried  Pain psychology: hasn't tried  Surgery: Lumbar spine surgery in the 1960s.   Injections:   - right intra-articular hip injections on 7/22/22 and 3/9/22 - H short term   -Right piriformis injection on 4/1/21 with 60% relief for 8 weeks. Repeated in June with approx 50% relief.  -Two epidural injections (right L5 tfesi), feb 2020 was helpful the next day but she had a fall right after, aug 2020 procedure - not helpful, had worsening pain  Alternative Therapies:               Chiropractic: hasn't tried               Acupuncture:  Yes - [place]    Medications:  Current Outpatient Medications   Medication Sig Dispense Refill     ACCU-CHEK SHANNON PLUS test strip USE TO TEST BLOOD SUGARS 3 TIMES DAILY 300 strip 5     acetaminophen (TYLENOL) 500 MG tablet Take 1,000 mg by mouth every 8 hours as needed (max 6 tablets/24 hours, 2 tablets/dose)        acyclovir (ZOVIRAX) 400 MG tablet Take 1 tablet (400 mg) by mouth 3 times daily For a couple days 15 tablet 2     acyclovir (ZOVIRAX) 5 % external ointment Apply topically 6 times daily As needed for outbreaks 15 g 3     albuterol (PROAIR HFA/PROVENTIL HFA/VENTOLIN HFA) 108 (90 Base) MCG/ACT inhaler Inhale 2 puffs into the lungs every 6 hours 18 g 11     alendronate (FOSAMAX) 70 MG tablet Take 1 tablet  (70 mg) by mouth every 7 days 12 tablet 0     anakinra (KINERET) 100 MG/0.67ML SOSY injection 100 mg every day x 3 days as needed for flare ups 6.7 mL 3     atorvastatin (LIPITOR) 10 MG tablet TAKE 1/2 TABLET BY MOUTH EVERY DAY 45 tablet 2     azithromycin (ZITHROMAX) 250 MG tablet TAKE 1 TABLET BY MOUTH EVERY DAY 30 tablet 5     BD PEN NEEDLE JANE 2ND GEN 32G X 4 MM miscellaneous USE 4 DAILY AS DIRECTED. 400 each 1     blood glucose (NO BRAND SPECIFIED) lancets standard Use to test blood sugar 3 times daily or as directed 100 lancet 3     cevimeline (EVOXAC) 30 MG capsule Take 1 capsule (30 mg) by mouth 3 times daily (Patient taking differently: Take 30 mg by mouth every other day) 270 capsule 2     Cholecalciferol (VITAMIN D3) 50 MCG (2000 UT) CAPS Take 100 mcg by mouth daily (Take 2 tablet (50 mcg) by mouth daily - Oral) 90 capsule 1     COMPOUNDED NON-CONTROLLED SUBSTANCE (CMPD RX) - PHARMACY TO MIX COMPOUNDED MEDICATION Estriol 1 mg/g Apply small amount to finger and apply to inside vagina daily for 2 weeks then twice weekly Route: vaginally Dispense 30 grams 11 refills (Patient taking differently: Estriol 1 mg/g Apply small amount to finger and apply to inside vagina twice weekly Route: vaginally Dispense 30 grams 11 refills) 30 g 11     cyanocobalamin (VITAMIN B-12) 1000 MCG tablet Take 1 tablet (1,000 mcg) by mouth daily 30 tablet 1     escitalopram (LEXAPRO) 20 MG tablet Take 1 tablet (20 mg) by mouth daily 90 tablet 0     ferrous sulfate (FEROSUL) 325 (65 Fe) MG tablet TAKE 1 TABLET BY MOUTH EVERY DAY WITH BREAKFAST 90 tablet 0     fluticasone (FLONASE) 50 MCG/ACT nasal spray SPRAY 1-2 SPRAYS INTO BOTH NOSTRILS DAILY AS NEEDED FOR ALLERGIES 16 mL 11     fluticasone-vilanterol (BREO ELLIPTA) 100-25 MCG/INH inhaler Inhale 1 puff into the lungs daily 1 each 11     HYDROcodone-acetaminophen (NORCO) 7.5-325 MG per tablet Take 1 tablet by mouth every 12 hours OK to fill and start 08/12/22 60 tablet 0      hydroxychloroquine (PLAQUENIL) 200 MG tablet Take 2 tablets (400 mg) by mouth daily Get annual eye exams for hydroxychloroquine (Plaquenil) monitoring and fax to [phone] 180 tablet 3     insulin glargine (BASAGLAR KWIKPEN) 100 UNIT/ML pen INJECT 22 UNITS SUBCUTANEOUS DAILY (TO REPLACE LANTUS) 15 mL 0     ketoconazole (NIZORAL) 2 % external cream Apply topically 2 times daily as needed for itching 60 g 1     KLOR-CON 20 MEQ CR tablet TAKE 2 TABLETS (40 MEQ) BY MOUTH EVERY MORNING AND 1 TABLET (20 MEQ) EVERY EVENING. 270 tablet 3     lidocaine (LIDODERM) 5 % patch APPLY PATCH TO PAINFUL AREA FOR UP TO 12 H WITHIN A 24 H PERIOD. REMOVE AFTER 12 HOURS. 30 patch 2     loratadine (CLARITIN) 10 MG tablet TAKE 1 TABLET BY MOUTH EVERY DAY 90 tablet 3     methocarbamol (ROBAXIN) 750 MG tablet Take 1 tablet (750 mg) by mouth 3 times daily as needed for muscle spasms 90 tablet 3     metoprolol succinate ER (TOPROL-XL) 50 MG 24 hr tablet Take 1 tablet (50 mg) by mouth 2 times daily 90 tablet 3     montelukast (SINGULAIR) 10 MG tablet TAKE 1 TABLET BY MOUTH EVERYDAY AT BEDTIME 90 tablet 0     NOVOLOG FLEXPEN 100 UNIT/ML soln INJECT 12 UNITS SUBCUTANEOUS 3 TIMES DAILY (WITH MEALS) 15 mL 1     OZEMPIC, 1 MG/DOSE, 4 MG/3ML SOPN INJECT 1 MG SUBCUTANEOUS ONCE A WEEK 3 mL 2     pantoprazole (PROTONIX) 40 MG EC tablet TAKE 1 TABLET (40 MG) BY MOUTH DAILY (REPLACES FAMOTIDINE- SHOULD STOP TAKING FAMOTIDINE) 90 tablet 0     predniSONE (DELTASONE) 5 MG tablet Take 1 tablet (5 mg) by mouth daily 90 tablet 1     rivaroxaban ANTICOAGULANT (XARELTO ANTICOAGULANT) 20 MG TABS tablet TAKE 1 TABLET BY MOUTH DAILY WITH DINNER 90 tablet 3     spironolactone (ALDACTONE) 25 MG tablet Take 2 tablets (50 mg) by mouth daily 180 tablet 3     torsemide (DEMADEX) 10 MG tablet TAKE ONE TAB (10 MG) WITH ONE 20 MG TAB TO = 30 MG DAILY IN AFTERNOON. 90 tablet 3     torsemide (DEMADEX) 20 MG tablet TAKE 2 TABLETS (40 MG) BY MOUTH EVERY MORNING AND 1 TABLET  (20 MG) DAILY AT 2 PM. TAKE THE AFTERNOON DOSE WITH AN EXTRA 10 MG TAB FOR A TOTAL OF 30 MG IN THE AFTERNOON 270 tablet 3     traZODone (DESYREL) 50 MG tablet Take 3 tablets (150 mg) by mouth nightly as needed for sleep 135 tablet 1     Diagnostic tests:  X-ray of right hip on 12/9/21 shows:  FINDINGS: AP and frog-leg lateral right hip views were obtained.      No acute osseous abnormality. Severe degenerative changes of the right  hip, progressed compared to prior. Vascular ossifications. Lumbar  spondylosis. Right SI joint degenerative change.        CSA and UDS due - 10/2022    Physical Exam:  Blood pressure (!) 143/59, pulse 56, SpO2 98 %, not currently breastfeeding.  General: A&O x 3  Gait: Antalgic and slow      Alanna Alba MD  [facility]     BILLING TIME DOCUMENTATION:   The total TIME spent on this patient on the date of the encounter/appointment was 20 minutes.      TOTAL TIME includes:   Time spent preparing to see the patient (reviewing records and tests)  Time spent face to face (or over the phone) with the patient   Time spent ordering tests, medications, procedures and referrals  Time spent documenting clinical information in Epic

## 2022-09-21 NOTE — PROCEDURES
[facility] Follow Up      Date of visit: 7/6/2022     This is a transfer patient from Dr. Moore. Last seen by Dr. Moore on 4/12/2022.        Assessment:  Sister Betty Buckner is a 82 year old medically complex patient with past medical history including: Atrial fibrillation, VLH, History of DVT, CHF, HLD, HTN, DM 2, Stage 3 CKD, RLS, Depression, History of etoh abuse (remission 30+ years) with the following chronic pain conditions:     1. Right sided hip and leg pain: Betty reports a long standing history of right hip and leg pain. They've been having worsening right lateral hip and groin pain. They also have a long standing history of pain that starts in her right low back/upper buttock and radiates laterally and posteriorly down the right leg to the foot. Initially was getting improvement with piriformis injection but declining benefit over time. Significant improvement noted with hip injection on 3/9/22. Appears that their hip pain is likely due to a combination of right sided gluteal weakness/dysfunction and severe right hip OA.     Plan:  1. Physical Therapy: chronic pain PT on hold for now due to other medical appointments.   2. Clinical Health Pain Psychologist: Not at this time.   3. Diagnostic Studies: None at this time.  4. Medication Management:   1. Continue Norco 7.5-325mg BID prn.  2. Continue methocarbamol 750mg TID prn.   3. Schedule tylenol 1000mg TID prn while pain is severe.  4. CSA/UDS - 9/2/21  5. Further procedures recommended:   1. Right hip joint injection can be repeated every 3 months. Order placed for repeat injection  6. Follow up: for injection      Chief complaint:       Chief Complaint   Patient presents with     Pain      Since her last visit, Betty Tee reports:  - She is still having right hip and leg pain. She had a right hip injection on 3/9/22 which she thinks was helpful. She was also getting piriformis injections for right hip and leg  pain so she is not certain which injections helped more with her current pain. She thinks it may have been the intra-articular injection that was more helpful.   - states she had a significant flare of pain from the last injection. Was painful during the procedure and after as well.   - she has norco at home to use for pain but does not like to take pain medication.  - she was working with Keith in PT but stopped because she has too many medical appointments right now.   - she needs to have a cardiac procedure done so waiting for that to be completed.     Medications:       Current pain medications:  -Tylenol 1000mg q8h prn - Usually takes 1-2 times a day  -Escitalopram 20mg daily              -Norco 7.5-325mg prn - takes it but not regularly              -methocarbamol 750mg tid prn              -Voltaren 1% gel qid prn     Current calculated MME: 15          Previous pain medications:  -Tramadol 50mg prn  -Tizanidine, flexeril - nh              -Gabapentin 300mg TID - memory difficulty     Past pain treatments:  Physical Therapy: hasn't completed PT for low back or hip pain                TENS unit: hasn't tried  Pain psychology: hasn't tried  Surgery: Lumbar spine surgery in the 1960s.   Injections:   -Right piriformis injection on 4/1/21 with 60% relief for 8 weeks. Repeated in June with approx 50% relief.  -Two epidural injections (right L5 tfesi), feb 2020 was helpful the next day but she had a fall right after, aug 2020 procedure - not helpful, had worsening pain  Alternative Therapies:               Chiropractic: hasn't tried               Acupuncture: hasn't tried     Medications:  Current Outpatient Prescriptions          Current Outpatient Medications   Medication Sig Dispense Refill     ACCU-CHEK SHANNON PLUS test strip USE TO TEST BLOOD SUGARS 3 TIMES DAILY 300 strip 5     acetaminophen (TYLENOL) 500 MG tablet Take 1,000 mg by mouth every 8 hours as needed (max 6 tablets/24 hours, 2 tablets/dose)           acyclovir (ZOVIRAX) 400 MG tablet Take 1 tablet (400 mg) by mouth 3 times daily For a couple days 15 tablet 2     acyclovir (ZOVIRAX) 5 % external ointment Apply topically 6 times daily As needed for outbreaks 15 g 3     albuterol (PROAIR HFA/PROVENTIL HFA/VENTOLIN HFA) 108 (90 Base) MCG/ACT inhaler Inhale 2 puffs into the lungs every 6 hours 18 g 11     alendronate (FOSAMAX) 70 MG tablet Take 1 tablet (70 mg) by mouth every 7 days 12 tablet 0     anakinra (KINERET) 100 MG/0.67ML SOSY injection 100 mg every day x 3 days as needed for flare ups 6.7 mL 3     atorvastatin (LIPITOR) 10 MG tablet TAKE 1/2 TABLET BY MOUTH EVERY DAY 45 tablet 2     azithromycin (ZITHROMAX) 250 MG tablet TAKE 1 TABLET BY MOUTH EVERY DAY 30 tablet 5     BD PEN NEEDLE JANE 2ND GEN 32G X 4 MM miscellaneous USE 4 DAILY AS DIRECTED. 400 each 1     bisacodyl (DULCOLAX) 5 MG EC tablet Take as directed. One day before exam take 2 tablets at 3 PM. Take 2 tablets at 11 PM. 4 tablet 0     blood glucose (NO BRAND SPECIFIED) lancets standard Use to test blood sugar 3 times daily or as directed 100 lancet 3     cevimeline (EVOXAC) 30 MG capsule Take 1 capsule (30 mg) by mouth 3 times daily (Patient taking differently: Take 30 mg by mouth every other day) 270 capsule 2     Cholecalciferol (VITAMIN D3) 50 MCG (2000 UT) CAPS Take 100 mcg by mouth daily (Take 2 tablet (50 mcg) by mouth daily - Oral) 90 capsule 1     COMPOUNDED NON-CONTROLLED SUBSTANCE (CMPD RX) - PHARMACY TO MIX COMPOUNDED MEDICATION Estriol 1 mg/g Apply small amount to finger and apply to inside vagina daily for 2 weeks then twice weekly Route: vaginally Dispense 30 grams 11 refills (Patient taking differently: Estriol 1 mg/g Apply small amount to finger and apply to inside vagina twice weekly Route: vaginally Dispense 30 grams 11 refills) 30 g 11     cyanocobalamin (VITAMIN B-12) 1000 MCG tablet Take 1 tablet (1,000 mcg) by mouth daily 30 tablet 1     escitalopram (LEXAPRO) 20 MG tablet  TAKE 1 TABLET BY MOUTH EVERY DAY 90 tablet 0     ferrous sulfate (FEROSUL) 325 (65 Fe) MG tablet TAKE 1 TABLET BY MOUTH EVERY DAY WITH BREAKFAST 90 tablet 0     fluticasone (FLONASE) 50 MCG/ACT nasal spray SPRAY 1-2 SPRAYS INTO BOTH NOSTRILS DAILY AS NEEDED FOR ALLERGIES 16 mL 11     fluticasone-vilanterol (BREO ELLIPTA) 100-25 MCG/INH inhaler Inhale 1 puff into the lungs daily 1 each 11     HYDROcodone-acetaminophen (NORCO) 7.5-325 MG per tablet Take 1 tablet by mouth every 12 hours OK to fill and start 06/28/22 60 tablet 0     hydroxychloroquine (PLAQUENIL) 200 MG tablet Take 2 tablets (400 mg) by mouth daily Get annual eye exams for hydroxychloroquine (Plaquenil) monitoring and fax to [phone] 180 tablet 3     insulin glargine (BASAGLAR KWIKPEN) 100 UNIT/ML pen INJECT 22 UNITS SUBCUTANEOUS DAILY (TO REPLACE LANTUS) 15 mL 0     ketoconazole (NIZORAL) 2 % external cream Apply topically 2 times daily as needed for itching 60 g 1     KLOR-CON 20 MEQ CR tablet TAKE 2 TABLETS (40 MEQ) BY MOUTH EVERY MORNING AND 1 TABLET (20 MEQ) EVERY EVENING. 270 tablet 3     lidocaine (LIDODERM) 5 % patch APPLY PATCH TO PAINFUL AREA FOR UP TO 12 H WITHIN A 24 H PERIOD. REMOVE AFTER 12 HOURS. (Patient taking differently: Apply patch to painful area as needed for up to 12 h within a 24 h period.  Remove after 12 hours.) 30 patch 2     loratadine (CLARITIN) 10 MG tablet TAKE 1 TABLET BY MOUTH EVERY DAY 90 tablet 3     methocarbamol (ROBAXIN) 750 MG tablet Take 1 tablet (750 mg) by mouth 3 times daily as needed for muscle spasms 90 tablet 3     metoprolol succinate ER (TOPROL-XL) 50 MG 24 hr tablet Take 1 tablet (50 mg) by mouth 2 times daily 90 tablet 3     montelukast (SINGULAIR) 10 MG tablet TAKE 1 TABLET BY MOUTH EVERYDAY AT BEDTIME 90 tablet 0     NOVOLOG FLEXPEN 100 UNIT/ML soln INJECT 12 UNITS SUBCUTANEOUS 3 TIMES DAILY (WITH MEALS) 15 mL 1     pantoprazole (PROTONIX) 40 MG EC tablet Take 1 tablet (40 mg) by mouth daily  (replaces famotidine- should stop taking famotidine) 90 tablet 1     polyethylene glycol (GOLYTELY) 236 g suspension Take as directed. One day before exam fill the jug with water. Cover and shake until well mixed. At 6 PM start drinking an 8oz glass of mixture every 15 minutes until jug is 1/2 empty. Store remainder in the refrigerator.  At 11 PM Start drinking the other half of the Golytely jug. Drink one 8-ounce glass every 15 minutes until the jug is empty. (Patient taking differently: Take as directed. One day before exam fill the jug with water. Cover and shake until well mixed. At 6 PM start drinking an 8oz glass of mixture every 15 minutes until jug is 1/2 empty. Store remainder in the refrigerator.  At 11 PM Start drinking the other half of the Golytely jug. Drink one 8-ounce glass every 15 minutes until the jug is empty.) 4000 mL 0     predniSONE (DELTASONE) 5 MG tablet Take 1 tablet (5 mg) by mouth daily 30 tablet 0     rivaroxaban ANTICOAGULANT (XARELTO ANTICOAGULANT) 20 MG TABS tablet TAKE 1 TABLET BY MOUTH DAILY WITH DINNER 90 tablet 3     Semaglutide, 1 MG/DOSE, (OZEMPIC, 1 MG/DOSE,) 4 MG/3ML SOPN Inject 1 mg Subcutaneous once a week (Patient taking differently: Inject 1 mg Subcutaneous once a week Mondays) 9 mL 1     spironolactone (ALDACTONE) 25 MG tablet Take 2 tablets (50 mg) by mouth daily 180 tablet 3     torsemide (DEMADEX) 10 MG tablet TAKE ONE TAB (10 MG) WITH ONE 20 MG TAB TO = 30 MG DAILY IN AFTERNOON. 90 tablet 3     torsemide (DEMADEX) 20 MG tablet TAKE 2 TABLETS (40 MG) BY MOUTH EVERY MORNING AND 1 TABLET (20 MG) DAILY AT 2 PM. TAKE THE AFTERNOON DOSE WITH AN EXTRA 10 MG TAB FOR A TOTAL OF 30 MG IN THE AFTERNOON 270 tablet 3     traZODone (DESYREL) 50 MG tablet Take 3 tablets (150 mg) by mouth nightly as needed for sleep 135 tablet 1           Diagnostic tests:  X-ray of right hip on 12/9/21 shows:  FINDINGS: AP and frog-leg lateral right hip views were obtained.      No acute osseous  abnormality. Severe degenerative changes of the right  hip, progressed compared to prior. Vascular ossifications. Lumbar  spondylosis. Right SI joint degenerative change.      CSA and UDS due - 10/2022        Physical Exam:  /72   Pulse 58   SpO2 96%   General: A&O x 3  Gait:     Antalgic and slow  MSK exam: tender to palpation of right gluteal musculature     Alanna Alba MD  [facility]      BILLING TIME DOCUMENTATION:   The total TIME spent on this patient on the date of the encounter/appointment was 40 minutes.       TOTAL TIME includes:   Time spent preparing to see the patient (reviewing records and tests)   Time spent face to face (or over the phone) with the patient   Time spent ordering tests, medications, procedures and referrals   Time spent documenting clinical information in Epic

## 2022-09-21 NOTE — PROCEDURES
[facility] Follow Up     Date of visit: 9/21/2022    Last seen by me on 8/24/22.      Assessment:  Sister Betty Buckner is a 82 year old medically complex patient with past medical history including: Atrial fibrillation, VLH, History of DVT, CHF, HLD, HTN, DM 2, Stage 3 CKD, RLS, Depression, History of etoh abuse (remission 30+ years) with the following chronic pain conditions:     1. Right sided hip and leg pain: Betty reports a long standing history of right hip and leg pain. They've been having worsening right lateral hip and groin pain. They also have a long standing history of pain that starts in her right low back/upper buttock and radiates laterally and posteriorly down the right leg to the foot. Initially was getting improvement with piriformis injection but declining benefit over time. Significant but short term improvement noted with hip injection x 2. Appears that their hip pain is likely due to a combination of right sided gluteal weakness/dysfunction and severe right hip OA.    Plan:  1. Therapies:  On hold for now  2. Clinical Health Pain Psychologist: Not at this time  3. Diagnostic Studies: None  4. Medication Management:   1. Discussed an opioid rotation to see if she gets better pain relief. She has been noting some benefit with norco but not significant. Will stop norco. Start Percocet 5-325 mg BID prn. 10 day script given. She will my chart to let me know if this is more helpful. If so will provide a 1 month prescription. If not helpful then will resume Norco 7.5-325 mg BID prn.   2. Continue methocarbamol 750 mg TID prn       5. Further procedures recommended: None. No long term benefit with hip injections. Is planning on having a hip replacement in the near future.   6. Agree with using a wheelchair when needing to ambulate longer distances since walking is the main aggravating factor for her pain  7. Updated CSA and UDS today  8. Follow up: 3  Proceed to [address]      Chief complaint:   Chief Complaint   Patient presents with     Pain     Since her last visit, Betty Tee reports:  - She had a repeat right intra-articular hip injection on 7/22/2022. Helpful for 1 week only.   - She was having severe pain in the hip last week. Did not know how she was going to make it until her hip replacement surgery.   - Then started using a wheelchair when needing to ambulate longer distances. This has helped a great deal. Currently pain has been more manageable.   - Still not using Norco regularly. It helps a little bit with pain but not significantly.       Pain scores:  Pain intensity currently is 0 on a scale of 0-10.     Medications:       Current pain medications:  -Tylenol 1000mg q8h prn - Usually takes 1-2 times a day  -Escitalopram 20mg daily              -Norco 7.5-325mg prn - takes it but not regularly              -methocarbamol 750mg tid prn              -Voltaren 1% gel qid prn    Current calculated MME: 15         Previous pain medications:  -Tramadol 50mg prn  -Tizanidine, flexeril - nh              -Gabapentin 300mg TID - memory difficulty       Past pain treatments:  Physical Therapy: hasn't completed PT for low back or hip pain                TENS unit: hasn't tried  Pain psychology: hasn't tried  Surgery: Lumbar spine surgery in the 1960s.   Injections:   - right intra-articular hip injections on 7/22/22 and 3/9/22 - H short term   -Right piriformis injection on 4/1/21 with 60% relief for 8 weeks. Repeated in June with approx 50% relief.  -Two epidural injections (right L5 tfesi), feb 2020 was helpful the next day but she had a fall right after, aug 2020 procedure - not helpful, had worsening pain  Alternative Therapies:               Chiropractic: hasn't tried               Acupuncture:  Yes - [facility]    Medications:  Current Outpatient Medications   Medication Sig Dispense Refill     ACCU-CHEK SHANNON PLUS test strip USE TO TEST BLOOD SUGARS 3 TIMES DAILY 300  strip 5     acetaminophen (TYLENOL) 500 MG tablet Take 1,000 mg by mouth every 8 hours as needed (max 6 tablets/24 hours, 2 tablets/dose)       acyclovir (ZOVIRAX) 400 MG tablet Take 1 tablet (400 mg) by mouth 3 times daily For a couple days 15 tablet 2     acyclovir (ZOVIRAX) 5 % external ointment Apply topically 6 times daily As needed for outbreaks 15 g 3     albuterol (PROAIR HFA/PROVENTIL HFA/VENTOLIN HFA) 108 (90 Base) MCG/ACT inhaler Inhale 2 puffs into the lungs every 6 hours 18 g 11     alendronate (FOSAMAX) 70 MG tablet Take 1 tablet (70 mg) by mouth every 7 days 12 tablet 0     anakinra (KINERET) 100 MG/0.67ML SOSY injection 100 mg every day x 3 days as needed for flare ups 6.7 mL 3     atorvastatin (LIPITOR) 10 MG tablet TAKE 1/2 TABLET BY MOUTH EVERY DAY 45 tablet 2     azithromycin (ZITHROMAX) 250 MG tablet TAKE 1 TABLET BY MOUTH EVERY DAY 30 tablet 5     BD PEN NEEDLE JANE 2ND GEN 32G X 4 MM miscellaneous USE 4 DAILY AS DIRECTED. 400 each 1     blood glucose (NO BRAND SPECIFIED) lancets standard Use to test blood sugar 3 times daily or as directed 100 lancet 3     cevimeline (EVOXAC) 30 MG capsule Take 1 capsule (30 mg) by mouth 3 times daily (Patient taking differently: Take 30 mg by mouth twice a week On Tuesdays and Fridays) 270 capsule 2     Cholecalciferol (VITAMIN D3) 50 MCG (2000 UT) CAPS TAKE 100 MCG BY MOUTH DAILY (TAKE 2 TABLET (50 MCG) BY MOUTH DAILY - ORAL) 90 capsule 1     COMPOUNDED NON-CONTROLLED SUBSTANCE (CMPD RX) - PHARMACY TO MIX COMPOUNDED MEDICATION Estriol 1 mg/g Apply small amount to finger and apply to inside vagina daily for 2 weeks then twice weekly Route: vaginally Dispense 30 grams 11 refills (Patient taking differently: Estriol 1 mg/g Apply small amount to finger and apply to inside vagina twice weekly Route: vaginally Dispense 30 grams 11 refills) 30 g 11     cyanocobalamin (VITAMIN B-12) 1000 MCG tablet Take 1 tablet (1,000 mcg) by mouth daily 30 tablet 1      escitalopram (LEXAPRO) 20 MG tablet Take 1 tablet (20 mg) by mouth daily 90 tablet 0     ferrous sulfate (FEROSUL) 325 (65 Fe) MG tablet TAKE 1 TABLET BY MOUTH EVERY DAY WITH BREAKFAST 90 tablet 0     fluticasone (FLONASE) 50 MCG/ACT nasal spray SPRAY 1-2 SPRAYS INTO BOTH NOSTRILS DAILY AS NEEDED FOR ALLERGIES 16 mL 11     fluticasone-vilanterol (BREO ELLIPTA) 100-25 MCG/INH inhaler Inhale 1 puff into the lungs daily 1 each 11     HYDROcodone-acetaminophen (NORCO) 7.5-325 MG per tablet Take 1 tablet by mouth every 12 hours OK to fill and start 08/12/22 60 tablet 0     hydroxychloroquine (PLAQUENIL) 200 MG tablet Take 2 tablets (400 mg) by mouth daily Get annual eye exams for hydroxychloroquine (Plaquenil) monitoring and fax to [phone] 180 tablet 3     insulin glargine (BASAGLAR KWIKPEN) 100 UNIT/ML pen INJECT 22 UNITS SUBCUTANEOUS DAILY (TO REPLACE LANTUS) (Patient taking differently: INJECT 24 UNITS SUBCUTANEOUS DAILY (TO REPLACE LANTUS)) 15 mL 1     ketoconazole (NIZORAL) 2 % external cream Apply topically 2 times daily as needed for itching 60 g 1     KLOR-CON 20 MEQ CR tablet TAKE 2 TABLETS (40 MEQ) BY MOUTH EVERY MORNING AND 1 TABLET (20 MEQ) EVERY EVENING. 270 tablet 3     lidocaine (LIDODERM) 5 % patch APPLY PATCH TO PAINFUL AREA FOR UP TO 12 H WITHIN A 24 H PERIOD. REMOVE AFTER 12 HOURS. (Patient taking differently: Apply patch to painful area for up to 12 h within a 24 h period.  Remove after 12 hours.) 30 patch 2     loratadine (CLARITIN) 10 MG tablet TAKE 1 TABLET BY MOUTH EVERY DAY 90 tablet 3     methocarbamol (ROBAXIN) 750 MG tablet Take 1 tablet (750 mg) by mouth 3 times daily as needed for muscle spasms 90 tablet 3     metoprolol succinate ER (TOPROL-XL) 50 MG 24 hr tablet Take 1 tablet (50 mg) by mouth 2 times daily 90 tablet 3     montelukast (SINGULAIR) 10 MG tablet TAKE 1 TABLET BY MOUTH EVERYDAY AT BEDTIME 90 tablet 0     NOVOLOG FLEXPEN 100 UNIT/ML soln INJECT 12 UNITS SUBCUTANEOUS 3 TIMES  DAILY (WITH MEALS) (Patient taking differently: 11 Units) 15 mL 1     OZEMPIC, 1 MG/DOSE, 4 MG/3ML SOPN INJECT 1 MG SUBCUTANEOUS ONCE A WEEK 3 mL 2     pantoprazole (PROTONIX) 40 MG EC tablet TAKE 1 TABLET (40 MG) BY MOUTH DAILY (REPLACES FAMOTIDINE- SHOULD STOP TAKING FAMOTIDINE) 90 tablet 0     predniSONE (DELTASONE) 5 MG tablet Take 1 tablet (5 mg) by mouth daily 90 tablet 1     rivaroxaban ANTICOAGULANT (XARELTO ANTICOAGULANT) 20 MG TABS tablet TAKE 1 TABLET BY MOUTH DAILY WITH DINNER 90 tablet 3     spironolactone (ALDACTONE) 25 MG tablet Take 2 tablets (50 mg) by mouth daily 180 tablet 3     torsemide (DEMADEX) 10 MG tablet TAKE ONE TAB (10 MG) WITH ONE 20 MG TAB TO = 30 MG DAILY IN AFTERNOON. 90 tablet 3     torsemide (DEMADEX) 20 MG tablet TAKE 2 TABLETS (40 MG) BY MOUTH EVERY MORNING AND 1 TABLET (20 MG) DAILY AT 2 PM. TAKE THE AFTERNOON DOSE WITH AN EXTRA 10 MG TAB FOR A TOTAL OF 30 MG IN THE AFTERNOON 270 tablet 3     traZODone (DESYREL) 50 MG tablet Take 3 tablets (150 mg) by mouth nightly as needed for sleep 135 tablet 1     Diagnostic tests:  X-ray of right hip on 12/9/21 shows:  FINDINGS: AP and frog-leg lateral right hip views were obtained.      No acute osseous abnormality. Severe degenerative changes of the right  hip, progressed compared to prior. Vascular ossifications. Lumbar  spondylosis. Right SI joint degenerative change.        CSA and UDS due - 10/2022     Physical Exam:  Blood pressure 105/55, pulse 51, SpO2 96 %, not currently breastfeeding.  General: A&O x 3, in NAD, presents to clinic in a wheelchair  Gait: Not evaluated today      Alanna Alba MD  [facility]     BILLING TIME DOCUMENTATION:   The total TIME spent on this patient on the date of the encounter/appointment was 20 minutes.      TOTAL TIME includes:   Time spent preparing to see the patient (reviewing records and tests)  Time spent face to face (or over the phone) with the patient  Time spent ordering  tests, medications, procedures and referrals  Time spent documenting clinical information in Epic                    dentures

## 2022-09-22 ENCOUNTER — LAB (OUTPATIENT)
Dept: LAB | Facility: CLINIC | Age: 82
End: 2022-09-22
Payer: MEDICARE

## 2022-09-22 DIAGNOSIS — Z79.891 ENCOUNTER FOR LONG-TERM OPIATE ANALGESIC USE: ICD-10-CM

## 2022-09-22 LAB
ABO/RH(D): NORMAL
ANTIBODY SCREEN: NEGATIVE
CREAT UR-MCNC: 35 MG/DL
SPECIMEN EXPIRATION DATE: NORMAL

## 2022-09-22 PROCEDURE — 80307 DRUG TEST PRSMV CHEM ANLYZR: CPT

## 2022-09-23 ENCOUNTER — LAB (OUTPATIENT)
Dept: LAB | Facility: CLINIC | Age: 82
End: 2022-09-23
Payer: MEDICARE

## 2022-09-23 DIAGNOSIS — I48.19 PERSISTENT ATRIAL FIBRILLATION (H): ICD-10-CM

## 2022-09-23 LAB
ANION GAP SERPL CALCULATED.3IONS-SCNC: 14 MMOL/L (ref 7–15)
BUN SERPL-MCNC: 15 MG/DL (ref 8–23)
CALCIUM SERPL-MCNC: 9.6 MG/DL (ref 8.8–10.2)
CHLORIDE SERPL-SCNC: 102 MMOL/L (ref 98–107)
CREAT SERPL-MCNC: 1.17 MG/DL (ref 0.51–0.95)
DEPRECATED HCO3 PLAS-SCNC: 25 MMOL/L (ref 22–29)
ERYTHROCYTE [DISTWIDTH] IN BLOOD BY AUTOMATED COUNT: 14.3 % (ref 10–15)
GFR SERPL CREATININE-BSD FRML MDRD: 46 ML/MIN/1.73M2
GLUCOSE SERPL-MCNC: 220 MG/DL (ref 70–99)
HCT VFR BLD AUTO: 37.5 % (ref 35–47)
HGB BLD-MCNC: 12 G/DL (ref 11.7–15.7)
INR PPP: 1.37 (ref 0.85–1.15)
MCH RBC QN AUTO: 30.3 PG (ref 26.5–33)
MCHC RBC AUTO-ENTMCNC: 32 G/DL (ref 31.5–36.5)
MCV RBC AUTO: 95 FL (ref 78–100)
PLATELET # BLD AUTO: 212 10E3/UL (ref 150–450)
POTASSIUM SERPL-SCNC: 3.5 MMOL/L (ref 3.4–5.3)
RBC # BLD AUTO: 3.96 10E6/UL (ref 3.8–5.2)
SARS-COV-2 RNA RESP QL NAA+PROBE: NEGATIVE
SODIUM SERPL-SCNC: 141 MMOL/L (ref 136–145)
WBC # BLD AUTO: 6.6 10E3/UL (ref 4–11)

## 2022-09-23 PROCEDURE — U0003 INFECTIOUS AGENT DETECTION BY NUCLEIC ACID (DNA OR RNA); SEVERE ACUTE RESPIRATORY SYNDROME CORONAVIRUS 2 (SARS-COV-2) (CORONAVIRUS DISEASE [COVID-19]), AMPLIFIED PROBE TECHNIQUE, MAKING USE OF HIGH THROUGHPUT TECHNOLOGIES AS DESCRIBED BY CMS-2020-01-R: HCPCS | Performed by: NURSE PRACTITIONER

## 2022-09-23 PROCEDURE — 80048 BASIC METABOLIC PNL TOTAL CA: CPT | Performed by: PATHOLOGY

## 2022-09-23 PROCEDURE — 36415 COLL VENOUS BLD VENIPUNCTURE: CPT | Performed by: PATHOLOGY

## 2022-09-23 PROCEDURE — 86850 RBC ANTIBODY SCREEN: CPT | Performed by: NURSE PRACTITIONER

## 2022-09-23 PROCEDURE — 85027 COMPLETE CBC AUTOMATED: CPT | Performed by: PATHOLOGY

## 2022-09-23 PROCEDURE — 85610 PROTHROMBIN TIME: CPT | Performed by: PATHOLOGY

## 2022-09-23 PROCEDURE — 86901 BLOOD TYPING SEROLOGIC RH(D): CPT | Performed by: NURSE PRACTITIONER

## 2022-09-23 RX ORDER — HYDROCODONE BITARTRATE AND ACETAMINOPHEN 7.5; 325 MG/1; MG/1
1 TABLET ORAL EVERY 12 HOURS
Qty: 60 TABLET | Refills: 0 | Status: CANCELLED | OUTPATIENT
Start: 2022-09-23

## 2022-09-23 NOTE — TELEPHONE ENCOUNTER
Pt was prescribed alternate therapy at appt 9/21    Rx will be canceled    Ilene SHERIDAN RN Care Coordinator  [facility]

## 2022-09-24 ENCOUNTER — ANESTHESIA EVENT (OUTPATIENT)
Dept: CARDIOLOGY | Facility: CLINIC | Age: 82
DRG: 274 | End: 2022-09-24
Payer: MEDICARE

## 2022-09-24 NOTE — H&P
[facility]      CSI History and Physicial  Betty Tee MRN: [id number]  1940  Date of Admission:(Not on file)  Primary care provider: Ilene Tristan      Assessment and Plan:     Betty Tee is an 82 year old year old female with a history of paroxysmal atrial fibrillation (on Xarelto), HFpEF, ILD, Sjogren's Syndrome, HTN, T2DM, severe hip OA, chronic pain, diverticulitis s/p colectomy 2011 with subsequent take down  who presents 9/26 for Watchman left atrial appendage closure device.     1. Paroxysmal a-fib with intolerance to AC  2. 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 Post-procedure VERONICA showed no evidence of benigno-device leak and no pericardial effusion. Patient seen in PACU. VSS, neuros intact, groin sites soft without hematoma. Currently in NSR/SB HR controlled.     - Recommend anticoagulation with Xarelto 15 mg daily (2/2 kidney function) x 45 days  - Continue ASA 81 mg daily  - Echo this afternoon, if no pericardial effusion patient may discharge home  - Follow-up structural PAUL 1 week and 45 days post procedure  - 45 days following Watchman device implantation, patient will return for VERONICA to evaluate endothelialization of the device. If adequate seal is assessed (<5mm), Xarelto will be discontinued and dual antiplatelet therapy with clopidogrel and aspirin will be initiated to continue for total of 6 months from implantation date, with aspirin then continuing lifelong.     3. Chronic diastolic heart failure: Currently appears euvolemic, resume torsemide 40 mg a.m./30 mg p.m and spironolactone 50 mg daily.     4. Hypertension: Currently on sprionolactone.     5. Hyperlipidemia: Continue atorvastatin.     6. Type II DM: Most recent A1C 6.4 8/22. Insulin dependent with novolog and lantus. Also on Ozempic. Resume PTA regimen.      7. Severe right hip pain 2/2 OA: Continue current pain regimen per Dr. Alba. She will  "return to Dr. Shannon (orthopedist) post Watchman, will be okay to hold AC and undergo right hip replacement 45 days post watchman.      8. PMR: Continue daily prednisone      9. Sjogrens: Continue Cevimeline for dry mouth     10. Gout: PRN anakinra for gout     11. ANGELICA: Continue CPAP       FEN: ADAT to Regular diet  Prophylaxis:  PO Xarelto  Disposition: Admit inpatient, Likely discharge home this evening pending stable recovery period, echo  Code Status: FULL CODE    Valarie Zavala, APRN, CNP  [facility] Structural/Interventional Cardiology  ASCOM [id number] or     Clinically Significant Risk Factors Present on Admission              # Coagulation Defect: INR = 1.37 (Ref range: 0.85 - 1.15) and/or PTT = N/A on admission, will monitor for bleeding    # Overweight: Estimated body mass index is 29.74 kg/m  as calculated from the following:    Height as of 9/19/22: 1.702 m (5' 7\").    Weight as of 9/19/22: 86.1 kg (189 lb 14.4 oz).    Cardiovascular: Cardiac Arrhythmia: Atrial fibrillation: Paroxysmal, HFpEF  GI Hemorrhage: Duodenitis with bleeding  Nephrology: Stage 3a (GFR 45-59)  Other Pulmonary Conditions: Interstitial lung disease  Systemic: Chronic Fatigue and Other Debilities: Other reduced mobility              Chief Complaint:   Paroxysmal atrial fibrillation          History of Present Illness:   Betty Tee is a very pleasant 82 year old year old ffemale with a history of paroxysmal atrial fibrillation (on Xarelto), HFpEF, ILD, Sjogren's Syndrome, HTN, T2DM, severe hip OA, chronic pain, diverticulitis s/p colectomy 2011 with subsequent take down  who presents 9/26 for Watchman left atrial appendage closure device.     History of a-fib with [id number] score of 6 (CHF, HTN, age, female, DM) and HAS-BLED score of 5 (HTN, CKD, age, prior bleed, meds) with intolerance to anticoagulation due to recurrent GI Bleeding. She was seen by Structural Cardiology team in clinic and deemed an appropriate Watchman Candidate. "     Patient underwent successful left atrial appendage closure with a 24 mm WATCHMAN FLX device. Post procedure VERONICA showed well seated ZACHARY occluder without leak or evidence of pericardial effusion. Right femoral venotomy closed with suture closure device.     Patient seen in the PACU post-procedure.  Drowsy, arouses to voice, no cardiovascular complaints. Right femoral puncture site CDI, non-tender to touch, soft, no bruising, no signs of hematoma. Vital signs stable. Patient does complain of some lower back pain (chronic issue); likely worsened by bedrest. RN planning to give prn medications.         Review of Systems:    10 point review of systems negative except for stated above in HPI.          Past Medical History:   Medical History reviewed.   Past Medical History:   Diagnosis Date     Alcohol abuse, in remission      Allergic rhinitis, cause unspecified     allegra helps when she takes it     Antiplatelet or antithrombotic long-term use      Atrial fibrillation (H)     in hosp in 11/11 after surgery w/ fluid overload     Cardiomegaly     LVH on stress echo- cardiac w/u at [facility] ER- neg CT scan for PE, neg stress echo in 8/06     Chest pain, unspecified      Congestive heart failure (H)      Depressive disorder      Diabetes (H)      Disorder of bone and cartilage, unspecified     osteopenia (had been on prempro), improved on 6/06 dexa, stable dexa 11/10     Diverticulosis of colon (without mention of hemorrhage)     last episode yrs ago     Essential hypertension, benign      Follicular bronchiolitis (H)     associated with Sjogrens, dx by chest CT showing mosaic attenuation and air trapping     Gastro-oesophageal reflux disease      ILD (interstitial lung disease) (H)     associated with Sjogrens, also has mildly elevated IgG4, first noted on chest CT 2015 (mild changes) and also has small airways disease; ILD improved on follow up chest CT 2018.     Insomnia, unspecified     weaned off clonazepam      Irregular heart beat      Lumbago 7/09    MRI with NEAL, now seeing Dr. Cain for sciatic sx's     Major depressive disorder, recurrent episode, moderate (H)      Obstructive sleep apnea     uses cpap     Osteoarthrosis, unspecified whether generalized or localized, unspecified site      Sjogren's syndrome (H)     + RG and SSA and lip bx     Sleep apnea      Tobacco use disorder     chantix in 9/07, started again in 6/08, working             Past Surgical History:   Surgical History reviewed.   Past Surgical History:   Procedure Laterality Date     APPENDECTOMY       BACK SURGERY  1962     BACK SURGERY  1962     BIOPSY BREAST  09/27/2002    Biopsy Left Breast     BIOPSY BREAST Left 09/27/2002     BREAST SURGERY       CARDIAC SURGERY       CARDIAC SURGERY       CHOLECYSTECTOMY  1990's?     CHOLECYSTECTOMY       COLECTOMY LEFT  11/07/2011    Procedure:COLECTOMY LEFT; Laparoscopic mobilization of splenic flexture, sigmoid colectomy, coloprotoscopy, loop illeostomy; Surgeon:CK SIDDIQI; Location:[facility] OR     COLECTOMY LEFT  11/07/2011     COLONOSCOPY  1990,s     COLONOSCOPY N/A 5/3/2022    Procedure: COLONOSCOPY;  Surgeon: Guru Winsome Dias MD;  Location: [facility] GI     ENT SURGERY       EYE SURGERY  2012     FLEXIBLE SIGMOIDOSCOPY  11/03/2011     HYSTERECTOMY TOTAL ABDOMINAL, BILATERAL SALPINGO-OOPHORECTOMY, COMBINED  11/07/2011    Procedure:COMBINED HYSTERECTOMY TOTAL ABDOMINAL, BILATERAL SALPINGO-OOPHORECTOMY; total abdominal hysterectomy, bilateral salpingo-oophorectomy; Surgeon:ALETA MANUEL; Location:[facility] OR     HYSTERECTOMY TOTAL ABDOMINAL, BILATERAL SALPINGO-OOPHORECTOMY, COMBINED  11/07/2011     ILEOSTOMY  02/01/2012    takedown loop ileostomy      INSERT STENT URETER  11/07/2011    Procedure:INSERT STENT URETER; Placement of Bilateral Ureteral Stents ; Surgeon:PRANEETH BRYANT; Location:[facility] OR     SIGMOIDECTOMY  02/01/2012    Dr. Siddiqi, Sigmoidectomy for diverticular abscess, iliostomy  afterwards until repair     SIGMOIDOSCOPY FLEXIBLE  2011    Procedure:SIGMOIDOSCOPY FLEXIBLE; Flexible Sigmoidoscopy; Surgeon:CK CASTANEDA; Location:[facility] OR     TAKEDOWN ILEOSTOMY  2012    Procedure:TAKEDOWN ILEOSTOMY; Takedown Loop Ileostomy ; Surgeon:CK CASTANEDA; Location:[facility] OR     URETERAL STENT PLACEMENT  2011     ZZC APPENDECTOMY  1970's?     ZZC NONSPECIFIC PROCEDURE  2005    exploratory abd lap, adhesions, resolved RLQ pain, diverticulitis episodes             Social History:   Social History reviewed.  Social History     Tobacco Use     Smoking status: Former Smoker     Packs/day: 0.50     Years: 10.00     Pack years: 5.00     Types: Cigarettes     Quit date: 2011     Years since quittin.1     Smokeless tobacco: Never Used     Tobacco comment:  ppd   Substance Use Topics     Alcohol use: No     Alcohol/week: 0.0 standard drinks     Comment: In recovery beginning              Family History:   Family History reviewed.   Family History   Problem Relation Age of Onset     C.A.D. Mother 63        MI- first at age 63     Heart Disease Mother      Hypertension Mother      Cerebrovascular Disease Mother      Hyperlipidemia Mother      Coronary Artery Disease Mother         MI-first at age 63     Other - See Comments Mother         cerebrovascular disease      Alcohol/Drug Father      Alzheimer Disease Father      Dementia Father      Hypertension Father      Hyperlipidemia Father      Substance Abuse Father      Diabetes Sister      Hypertension Sister      Hyperlipidemia Sister      Substance Abuse Sister      Asthma Sister      C.A.D. Sister 52        Minor MI- age 50's     Heart Disease Sister      Hypertension Sister      Hypertension Sister      Cancer - colorectal Sister 48        Late 40's early 50's     Gastrointestinal Disease Sister         Diverticulitis     Lipids Sister      Lipids Sister      Diabetes Sister      Heart Disease Sister         CHF      Cancer Sister         lung, smoker     Substance Abuse Sister      Asthma Sister      Cancer Sister      Coronary Artery Disease Sister         minor MI-age 50's     Heart Disease Sister      Hypertension Sister      Hypertension Sister      Colon Cancer Sister      Diverticulitis Sister      Lung Cancer Sister      Heart Disease Sister         CHF     Diabetes Sister      Asthma Sister      Hypertension Brother      Prostate Cancer Brother 74        Dx'd age 74     Gastrointestinal Disease Brother         Diverticulitis     Parkinsonism Brother      Substance Abuse Brother      Prostate Cancer Brother      Hyperlipidemia Brother      Hypertension Brother      Prostate Cancer Brother      Diverticulitis Brother      Parkinsonism Brother      Breast Cancer Daughter      Breast Cancer Daughter      Diabetes Other      Hypertension Other      Anesthesia Reaction No family hx of      Deep Vein Thrombosis (DVT) No family hx of              Allergies:     Allergies   Allergen Reactions     Amoxicillin-Pot Clavulanate      Augmentin Nausea and Vomiting     Codeine Nausea and Vomiting     Codeine      PN: LW Reaction: HIVES     Penicillins Nausea and Vomiting     PN: LW Reaction: GI Upset     Phenobarbital Itching     Phenobarbital      Seasonal Allergies              Medications:   Medications Reviewed.   No current facility-administered medications for this encounter.     Current Outpatient Medications   Medication Sig     ACCU-CHEK SHANNON PLUS test strip USE TO TEST BLOOD SUGARS 3 TIMES DAILY     acetaminophen (TYLENOL) 500 MG tablet Take 1,000 mg by mouth every 8 hours as needed (max 6 tablets/24 hours, 2 tablets/dose)     acyclovir (ZOVIRAX) 400 MG tablet Take 1 tablet (400 mg) by mouth 3 times daily For a couple days     acyclovir (ZOVIRAX) 5 % external ointment Apply topically 6 times daily As needed for outbreaks     albuterol (PROAIR HFA/PROVENTIL HFA/VENTOLIN HFA) 108 (90 Base) MCG/ACT inhaler Inhale 2 puffs into  the lungs every 6 hours     alendronate (FOSAMAX) 70 MG tablet Take 1 tablet (70 mg) by mouth every 7 days     anakinra (KINERET) 100 MG/0.67ML SOSY injection 100 mg every day x 3 days as needed for flare ups     atorvastatin (LIPITOR) 10 MG tablet TAKE 1/2 TABLET BY MOUTH EVERY DAY     azithromycin (ZITHROMAX) 250 MG tablet TAKE 1 TABLET BY MOUTH EVERY DAY     BD PEN NEEDLE JANE 2ND GEN 32G X 4 MM miscellaneous USE 4 DAILY AS DIRECTED.     blood glucose (NO BRAND SPECIFIED) lancets standard Use to test blood sugar 3 times daily or as directed     cevimeline (EVOXAC) 30 MG capsule Take 1 capsule (30 mg) by mouth 3 times daily (Patient taking differently: Take 30 mg by mouth twice a week On Tuesdays and Fridays)     Cholecalciferol (VITAMIN D3) 50 MCG (2000 UT) CAPS TAKE 100 MCG BY MOUTH DAILY (TAKE 2 TABLET (50 MCG) BY MOUTH DAILY - ORAL)     COMPOUNDED NON-CONTROLLED SUBSTANCE (CMPD RX) - PHARMACY TO MIX COMPOUNDED MEDICATION Estriol 1 mg/g Apply small amount to finger and apply to inside vagina daily for 2 weeks then twice weekly Route: vaginally Dispense 30 grams 11 refills (Patient taking differently: Estriol 1 mg/g Apply small amount to finger and apply to inside vagina twice weekly Route: vaginally Dispense 30 grams 11 refills)     cyanocobalamin (VITAMIN B-12) 1000 MCG tablet Take 1 tablet (1,000 mcg) by mouth daily     escitalopram (LEXAPRO) 20 MG tablet Take 1 tablet (20 mg) by mouth daily     ferrous sulfate (FEROSUL) 325 (65 Fe) MG tablet TAKE 1 TABLET BY MOUTH EVERY DAY WITH BREAKFAST     fluticasone (FLONASE) 50 MCG/ACT nasal spray SPRAY 1-2 SPRAYS INTO BOTH NOSTRILS DAILY AS NEEDED FOR ALLERGIES     fluticasone-vilanterol (BREO ELLIPTA) 100-25 MCG/INH inhaler Inhale 1 puff into the lungs daily     hydroxychloroquine (PLAQUENIL) 200 MG tablet Take 2 tablets (400 mg) by mouth daily Get annual eye exams for hydroxychloroquine (Plaquenil) monitoring and fax to [phone]     insulin glargine (BASAGLAR  KWIKPEN) 100 UNIT/ML pen INJECT 22 UNITS SUBCUTANEOUS DAILY (TO REPLACE LANTUS) (Patient taking differently: INJECT 24 UNITS SUBCUTANEOUS DAILY (TO REPLACE LANTUS))     ketoconazole (NIZORAL) 2 % external cream Apply topically 2 times daily as needed for itching     KLOR-CON 20 MEQ CR tablet TAKE 2 TABLETS (40 MEQ) BY MOUTH EVERY MORNING AND 1 TABLET (20 MEQ) EVERY EVENING.     lidocaine (LIDODERM) 5 % patch APPLY PATCH TO PAINFUL AREA FOR UP TO 12 H WITHIN A 24 H PERIOD. REMOVE AFTER 12 HOURS. (Patient taking differently: Apply patch to painful area for up to 12 h within a 24 h period.  Remove after 12 hours.)     loratadine (CLARITIN) 10 MG tablet TAKE 1 TABLET BY MOUTH EVERY DAY     methocarbamol (ROBAXIN) 750 MG tablet Take 1 tablet (750 mg) by mouth 3 times daily as needed for muscle spasms     metoprolol succinate ER (TOPROL-XL) 50 MG 24 hr tablet Take 1 tablet (50 mg) by mouth 2 times daily     montelukast (SINGULAIR) 10 MG tablet TAKE 1 TABLET BY MOUTH EVERYDAY AT BEDTIME     NOVOLOG FLEXPEN 100 UNIT/ML soln INJECT 12 UNITS SUBCUTANEOUS 3 TIMES DAILY (WITH MEALS) (Patient taking differently: 11 Units)     oxyCODONE-acetaminophen (PERCOCET) 5-325 MG tablet Take 1 tablet by mouth 2 times daily as needed for pain May fill and start on 9/21/2022. For chronic pain.     OZEMPIC, 1 MG/DOSE, 4 MG/3ML SOPN INJECT 1 MG SUBCUTANEOUS ONCE A WEEK     pantoprazole (PROTONIX) 40 MG EC tablet TAKE 1 TABLET (40 MG) BY MOUTH DAILY (REPLACES FAMOTIDINE- SHOULD STOP TAKING FAMOTIDINE)     predniSONE (DELTASONE) 5 MG tablet Take 1 tablet (5 mg) by mouth daily     rivaroxaban ANTICOAGULANT (XARELTO ANTICOAGULANT) 20 MG TABS tablet TAKE 1 TABLET BY MOUTH DAILY WITH DINNER     spironolactone (ALDACTONE) 25 MG tablet Take 2 tablets (50 mg) by mouth daily     torsemide (DEMADEX) 10 MG tablet TAKE ONE TAB (10 MG) WITH ONE 20 MG TAB TO = 30 MG DAILY IN AFTERNOON.     torsemide (DEMADEX) 20 MG tablet TAKE 2 TABLETS (40 MG) BY MOUTH  "EVERY MORNING AND 1 TABLET (20 MG) DAILY AT 2 PM. TAKE THE AFTERNOON DOSE WITH AN EXTRA 10 MG TAB FOR A TOTAL OF 30 MG IN THE AFTERNOON     traZODone (DESYREL) 50 MG tablet Take 3 tablets (150 mg) by mouth nightly as needed for sleep             Physical Exam:   Vitals were reviewed.  Blood pressure 118/61, pulse 52, temperature 97.3  F (36.3  C), temperature source Oral, resp. rate 18, height 1.702 m (5' 7\"), weight 84.7 kg (186 lb 11.7 oz), SpO2 96 %, not currently breastfeeding.    General: AAOx3, NAD  Skin: Not jaundiced, no rash, no ecchymoses  HEENT: MMM, PERRLA, EOM intact  CV: RRR, normal S1S2, no murmur, clicks, rubs  Resp: Clear to auscultation bilaterally, no wheezes, rhonchi  Abd: Soft, non-tender, BS+, no masses appreciated  Extremities: warm and well perfused, palpable pulses, no edema; R groin site CDI, soft, non-tender to touch, no bruising, no signs of hematoma.   Neuro: No lateralizing symptoms or focal neurologic deficits        Labs:   Routine Labs:  Lab Results   Component Value Date    TROPI <0.015 03/04/2020    TROPI <0.015 03/03/2020    TROPI 0.096 (H) 04/06/2017    TROPI 0.140 (HH) 04/06/2017    TROPI 0.110 (H) 04/06/2017    TROPONIN 0.00 04/02/2017    TROPONIN 0.00 01/09/2015     CMP  Recent Labs   Lab 09/23/22  1221      POTASSIUM 3.5   CHLORIDE 102   CO2 25   ANIONGAP 14   *   BUN 15.0   CR 1.17*   GFRESTIMATED 46*   CLAUDY 9.6     CBC  Recent Labs   Lab 09/23/22  1221   WBC 6.6   RBC 3.96   HGB 12.0   HCT 37.5   MCV 95   MCH 30.3   MCHC 32.0   RDW 14.3        INR  Recent Labs   Lab 09/23/22  1221   INR 1.37*           Diagnostics:    EKG 12 Lead 9/26/2022: SB, HR 50, 1st deg AVB         Time Spent on this Encounter   I spent 60 minutes on the unit/floor managing the care of Betty Tee. Over 50% of my time was spent on the following:   - Counseling the patient and/or family regarding: diagnostic results, prognosis, risks and benefits of treatment options, " recommended follow-up and medical compliance  - Coordination of care with the: nurse      Kimberley Zavala, CNP      Physician Attestation   I have reviewed and discussed with the advanced practice provider their history, physical and plan for Betty GRISELDA Watkinsey. I did not participate in a shared visit by interviewing or examining the patient and this should be billed as an advanced practice provider only visit.    Uzair Crews MD  Interventional Cardiology

## 2022-09-26 ENCOUNTER — HOSPITAL ENCOUNTER (INPATIENT)
Facility: CLINIC | Age: 82
LOS: 1 days | Discharge: HOME | DRG: 274 | End: 2022-09-26
Attending: INTERNAL MEDICINE | Admitting: INTERNAL MEDICINE
Payer: MEDICARE

## 2022-09-26 ENCOUNTER — APPOINTMENT (OUTPATIENT)
Dept: CARDIOLOGY | Facility: CLINIC | Age: 82
DRG: 274 | End: 2022-09-26
Attending: NURSE PRACTITIONER
Payer: MEDICARE

## 2022-09-26 ENCOUNTER — ANESTHESIA (OUTPATIENT)
Dept: CARDIOLOGY | Facility: CLINIC | Age: 82
DRG: 274 | End: 2022-09-26
Payer: MEDICARE

## 2022-09-26 ENCOUNTER — HOSPITAL ENCOUNTER (OUTPATIENT)
Dept: CARDIOLOGY | Facility: CLINIC | Age: 82
Discharge: HOME | DRG: 274 | End: 2022-09-26
Attending: INTERNAL MEDICINE | Admitting: INTERNAL MEDICINE
Payer: MEDICARE

## 2022-09-26 VITALS
RESPIRATION RATE: 18 BRPM | HEART RATE: 52 BPM | TEMPERATURE: 97.7 F | WEIGHT: 186.73 LBS | OXYGEN SATURATION: 97 % | SYSTOLIC BLOOD PRESSURE: 120 MMHG | BODY MASS INDEX: 29.31 KG/M2 | HEIGHT: 67 IN | DIASTOLIC BLOOD PRESSURE: 64 MMHG

## 2022-09-26 DIAGNOSIS — I48.21 PERMANENT ATRIAL FIBRILLATION (H): ICD-10-CM

## 2022-09-26 DIAGNOSIS — B00.1 RECURRENT COLD SORES: ICD-10-CM

## 2022-09-26 DIAGNOSIS — I48.19 PERSISTENT ATRIAL FIBRILLATION (H): ICD-10-CM

## 2022-09-26 DIAGNOSIS — Z95.818 PRESENCE OF WATCHMAN LEFT ATRIAL APPENDAGE CLOSURE DEVICE: Primary | ICD-10-CM

## 2022-09-26 DIAGNOSIS — M06.00 SERONEGATIVE RHEUMATOID ARTHRITIS (H): ICD-10-CM

## 2022-09-26 DIAGNOSIS — M10.9 ACUTE GOUT OF LEFT FOOT, UNSPECIFIED CAUSE: ICD-10-CM

## 2022-09-26 LAB
ACT BLD: 224 SECONDS (ref 74–150)
ACT BLD: 245 SECONDS (ref 74–150)
ATRIAL RATE - MUSE: 50 BPM
BLD PROD TYP BPU: NORMAL
BLD PROD TYP BPU: NORMAL
BLOOD COMPONENT TYPE: NORMAL
BLOOD COMPONENT TYPE: NORMAL
CODING SYSTEM: NORMAL
CODING SYSTEM: NORMAL
CROSSMATCH: NORMAL
CROSSMATCH: NORMAL
DIASTOLIC BLOOD PRESSURE - MUSE: NORMAL MMHG
GLUCOSE BLDC GLUCOMTR-MCNC: 169 MG/DL (ref 70–99)
GLUCOSE BLDC GLUCOMTR-MCNC: 194 MG/DL (ref 70–99)
GLUCOSE BLDC GLUCOMTR-MCNC: 198 MG/DL (ref 70–99)
INTERPRETATION ECG - MUSE: NORMAL
P AXIS - MUSE: 88 DEGREES
PR INTERVAL - MUSE: 234 MS
QRS DURATION - MUSE: 82 MS
QT - MUSE: 520 MS
QTC - MUSE: 474 MS
R AXIS - MUSE: 31 DEGREES
SYSTOLIC BLOOD PRESSURE - MUSE: NORMAL MMHG
T AXIS - MUSE: 60 DEGREES
UNIT ABO/RH: NORMAL
UNIT ABO/RH: NORMAL
UNIT NUMBER: NORMAL
UNIT NUMBER: NORMAL
UNIT STATUS: NORMAL
UNIT STATUS: NORMAL
UNIT TYPE ISBT: 5100
UNIT TYPE ISBT: 5100
VENTRICULAR RATE- MUSE: 50 BPM

## 2022-09-26 PROCEDURE — 250N000011 HC RX IP 250 OP 636: Performed by: NURSE ANESTHETIST, CERTIFIED REGISTERED

## 2022-09-26 PROCEDURE — 250N000013 HC RX MED GY IP 250 OP 250 PS 637: Performed by: ANESTHESIOLOGY

## 2022-09-26 PROCEDURE — 250N000011 HC RX IP 250 OP 636: Performed by: INTERNAL MEDICINE

## 2022-09-26 PROCEDURE — 370N000017 HC ANESTHESIA TECHNICAL FEE, PER MIN: Performed by: INTERNAL MEDICINE

## 2022-09-26 PROCEDURE — 93325 DOPPLER ECHO COLOR FLOW MAPG: CPT | Mod: 26 | Performed by: INTERNAL MEDICINE

## 2022-09-26 PROCEDURE — 33340 PERQ CLSR TCAT L ATR APNDGE: CPT | Mod: Q0 | Performed by: INTERNAL MEDICINE

## 2022-09-26 PROCEDURE — C1894 INTRO/SHEATH, NON-LASER: HCPCS | Performed by: INTERNAL MEDICINE

## 2022-09-26 PROCEDURE — C1887 CATHETER, GUIDING: HCPCS | Performed by: INTERNAL MEDICINE

## 2022-09-26 PROCEDURE — 250N000009 HC RX 250: Performed by: ANESTHESIOLOGY

## 2022-09-26 PROCEDURE — 99222 1ST HOSP IP/OBS MODERATE 55: CPT | Mod: 25 | Performed by: NURSE PRACTITIONER

## 2022-09-26 PROCEDURE — 33340 PERQ CLSR TCAT L ATR APNDGE: CPT | Performed by: INTERNAL MEDICINE

## 2022-09-26 PROCEDURE — 250N000009 HC RX 250: Performed by: NURSE ANESTHETIST, CERTIFIED REGISTERED

## 2022-09-26 PROCEDURE — 02L73DK OCCLUSION OF LEFT ATRIAL APPENDAGE WITH INTRALUMINAL DEVICE, PERCUTANEOUS APPROACH: ICD-10-PCS | Performed by: INTERNAL MEDICINE

## 2022-09-26 PROCEDURE — 93308 TTE F-UP OR LMTD: CPT

## 2022-09-26 PROCEDURE — 250N000011 HC RX IP 250 OP 636: Performed by: NURSE PRACTITIONER

## 2022-09-26 PROCEDURE — 93308 TTE F-UP OR LMTD: CPT | Mod: 26 | Performed by: INTERNAL MEDICINE

## 2022-09-26 PROCEDURE — 82962 GLUCOSE BLOOD TEST: CPT

## 2022-09-26 PROCEDURE — 93010 ELECTROCARDIOGRAM REPORT: CPT | Mod: 59 | Performed by: INTERNAL MEDICINE

## 2022-09-26 PROCEDURE — 250N000011 HC RX IP 250 OP 636: Performed by: ANESTHESIOLOGY

## 2022-09-26 PROCEDURE — 120N000002 HC R&B MED SURG/OB UMMC

## 2022-09-26 PROCEDURE — 250N000013 HC RX MED GY IP 250 OP 250 PS 637: Performed by: NURSE PRACTITIONER

## 2022-09-26 PROCEDURE — 258N000003 HC RX IP 258 OP 636: Performed by: NURSE PRACTITIONER

## 2022-09-26 PROCEDURE — 85347 COAGULATION TIME ACTIVATED: CPT

## 2022-09-26 PROCEDURE — 999N000054 HC STATISTIC EKG NON-CHARGEABLE

## 2022-09-26 PROCEDURE — 272N000001 HC OR GENERAL SUPPLY STERILE: Performed by: INTERNAL MEDICINE

## 2022-09-26 PROCEDURE — 93355 ECHO TRANSESOPHAGEAL (TEE): CPT

## 2022-09-26 PROCEDURE — 86923 COMPATIBILITY TEST ELECTRIC: CPT | Performed by: NURSE PRACTITIONER

## 2022-09-26 PROCEDURE — 93325 DOPPLER ECHO COLOR FLOW MAPG: CPT

## 2022-09-26 PROCEDURE — 999N000134 HC STATISTIC PP CARE STAGE 3

## 2022-09-26 PROCEDURE — C1760 CLOSURE DEV, VASC: HCPCS | Performed by: INTERNAL MEDICINE

## 2022-09-26 PROCEDURE — 93355 ECHO TRANSESOPHAGEAL (TEE): CPT | Mod: 59 | Performed by: STUDENT IN AN ORGANIZED HEALTH CARE EDUCATION/TRAINING PROGRAM

## 2022-09-26 PROCEDURE — C1769 GUIDE WIRE: HCPCS | Performed by: INTERNAL MEDICINE

## 2022-09-26 DEVICE — OCCLUDER CV WATCHMAN FLX OD24 MM M635WU50240: Type: IMPLANTABLE DEVICE | Status: FUNCTIONAL

## 2022-09-26 RX ORDER — POTASSIUM CHLORIDE 750 MG/1
20 TABLET, EXTENDED RELEASE ORAL
Status: DISCONTINUED | OUTPATIENT
Start: 2022-09-26 | End: 2022-09-26 | Stop reason: HOSPADM

## 2022-09-26 RX ORDER — ACYCLOVIR 50 MG/G
OINTMENT TOPICAL PRN
Qty: 15 G | Refills: 3 | COMMUNITY
Start: 2022-09-26 | End: 2023-05-10

## 2022-09-26 RX ORDER — IOPAMIDOL 755 MG/ML
INJECTION, SOLUTION INTRAVASCULAR
Status: DISCONTINUED | OUTPATIENT
Start: 2022-09-26 | End: 2022-09-26 | Stop reason: HOSPADM

## 2022-09-26 RX ORDER — NALOXONE HYDROCHLORIDE 0.4 MG/ML
0.4 INJECTION, SOLUTION INTRAMUSCULAR; INTRAVENOUS; SUBCUTANEOUS
Status: DISCONTINUED | OUTPATIENT
Start: 2022-09-26 | End: 2022-09-26 | Stop reason: HOSPADM

## 2022-09-26 RX ORDER — INSULIN GLARGINE 100 [IU]/ML
22 INJECTION, SOLUTION SUBCUTANEOUS AT BEDTIME
Status: DISCONTINUED | OUTPATIENT
Start: 2022-09-26 | End: 2022-09-26 | Stop reason: HOSPADM

## 2022-09-26 RX ORDER — FENTANYL CITRATE 50 UG/ML
INJECTION, SOLUTION INTRAMUSCULAR; INTRAVENOUS PRN
Status: DISCONTINUED | OUTPATIENT
Start: 2022-09-26 | End: 2022-09-26

## 2022-09-26 RX ORDER — FENTANYL CITRATE 50 UG/ML
25 INJECTION, SOLUTION INTRAMUSCULAR; INTRAVENOUS
Status: DISCONTINUED | OUTPATIENT
Start: 2022-09-26 | End: 2022-09-26 | Stop reason: HOSPADM

## 2022-09-26 RX ORDER — SODIUM CHLORIDE 9 MG/ML
1000 INJECTION, SOLUTION INTRAVENOUS CONTINUOUS
Status: DISCONTINUED | OUTPATIENT
Start: 2022-09-26 | End: 2022-09-26 | Stop reason: HOSPADM

## 2022-09-26 RX ORDER — ALBUTEROL SULFATE 90 UG/1
2 AEROSOL, METERED RESPIRATORY (INHALATION) EVERY 6 HOURS PRN
Status: DISCONTINUED | OUTPATIENT
Start: 2022-09-26 | End: 2022-09-26 | Stop reason: HOSPADM

## 2022-09-26 RX ORDER — FENTANYL CITRATE 50 UG/ML
50 INJECTION, SOLUTION INTRAMUSCULAR; INTRAVENOUS EVERY 5 MIN PRN
Status: DISCONTINUED | OUTPATIENT
Start: 2022-09-26 | End: 2022-09-26 | Stop reason: HOSPADM

## 2022-09-26 RX ORDER — CLINDAMYCIN PHOSPHATE 900 MG/50ML
900 INJECTION, SOLUTION INTRAVENOUS
Status: COMPLETED | OUTPATIENT
Start: 2022-09-26 | End: 2022-09-26

## 2022-09-26 RX ORDER — ESCITALOPRAM OXALATE 20 MG/1
20 TABLET ORAL DAILY
Status: DISCONTINUED | OUTPATIENT
Start: 2022-09-27 | End: 2022-09-26 | Stop reason: HOSPADM

## 2022-09-26 RX ORDER — ASPIRIN 81 MG/1
81 TABLET ORAL DAILY
Status: DISCONTINUED | OUTPATIENT
Start: 2022-09-26 | End: 2022-09-26 | Stop reason: HOSPADM

## 2022-09-26 RX ORDER — METOPROLOL SUCCINATE 50 MG/1
50 TABLET, EXTENDED RELEASE ORAL 2 TIMES DAILY
Status: DISCONTINUED | OUTPATIENT
Start: 2022-09-26 | End: 2022-09-26 | Stop reason: HOSPADM

## 2022-09-26 RX ORDER — METHOCARBAMOL 750 MG/1
750 TABLET, FILM COATED ORAL 3 TIMES DAILY PRN
Status: DISCONTINUED | OUTPATIENT
Start: 2022-09-26 | End: 2022-09-26 | Stop reason: HOSPADM

## 2022-09-26 RX ORDER — NALOXONE HYDROCHLORIDE 0.4 MG/ML
0.2 INJECTION, SOLUTION INTRAMUSCULAR; INTRAVENOUS; SUBCUTANEOUS
Status: DISCONTINUED | OUTPATIENT
Start: 2022-09-26 | End: 2022-09-26 | Stop reason: HOSPADM

## 2022-09-26 RX ORDER — MEPERIDINE HYDROCHLORIDE 25 MG/ML
12.5 INJECTION INTRAMUSCULAR; INTRAVENOUS; SUBCUTANEOUS
Status: DISCONTINUED | OUTPATIENT
Start: 2022-09-26 | End: 2022-09-26 | Stop reason: HOSPADM

## 2022-09-26 RX ORDER — TORSEMIDE 20 MG/1
40 TABLET ORAL DAILY
Status: DISCONTINUED | OUTPATIENT
Start: 2022-09-26 | End: 2022-09-26 | Stop reason: HOSPADM

## 2022-09-26 RX ORDER — SODIUM CHLORIDE, SODIUM LACTATE, POTASSIUM CHLORIDE, CALCIUM CHLORIDE 600; 310; 30; 20 MG/100ML; MG/100ML; MG/100ML; MG/100ML
INJECTION, SOLUTION INTRAVENOUS CONTINUOUS
Status: DISCONTINUED | OUTPATIENT
Start: 2022-09-26 | End: 2022-09-26 | Stop reason: HOSPADM

## 2022-09-26 RX ORDER — PROPOFOL 10 MG/ML
INJECTION, EMULSION INTRAVENOUS PRN
Status: DISCONTINUED | OUTPATIENT
Start: 2022-09-26 | End: 2022-09-26

## 2022-09-26 RX ORDER — FERROUS SULFATE 325(65) MG
325 TABLET ORAL
Status: DISCONTINUED | OUTPATIENT
Start: 2022-09-27 | End: 2022-09-26 | Stop reason: HOSPADM

## 2022-09-26 RX ORDER — ACETAMINOPHEN 325 MG/1
650 TABLET ORAL EVERY 4 HOURS PRN
Status: DISCONTINUED | OUTPATIENT
Start: 2022-09-26 | End: 2022-09-26 | Stop reason: HOSPADM

## 2022-09-26 RX ORDER — LIDOCAINE HYDROCHLORIDE 20 MG/ML
INJECTION, SOLUTION INFILTRATION; PERINEURAL PRN
Status: DISCONTINUED | OUTPATIENT
Start: 2022-09-26 | End: 2022-09-26

## 2022-09-26 RX ORDER — ACETAMINOPHEN 500 MG
1000 TABLET ORAL EVERY 8 HOURS PRN
Status: DISCONTINUED | OUTPATIENT
Start: 2022-09-26 | End: 2022-09-26 | Stop reason: HOSPADM

## 2022-09-26 RX ORDER — ONDANSETRON 4 MG/1
4 TABLET, ORALLY DISINTEGRATING ORAL EVERY 30 MIN PRN
Status: DISCONTINUED | OUTPATIENT
Start: 2022-09-26 | End: 2022-09-26 | Stop reason: HOSPADM

## 2022-09-26 RX ORDER — OXYCODONE AND ACETAMINOPHEN 5; 325 MG/1; MG/1
1 TABLET ORAL 2 TIMES DAILY PRN
Status: DISCONTINUED | OUTPATIENT
Start: 2022-09-26 | End: 2022-09-26 | Stop reason: HOSPADM

## 2022-09-26 RX ORDER — OXYCODONE HYDROCHLORIDE 5 MG/1
5 TABLET ORAL EVERY 4 HOURS PRN
Status: DISCONTINUED | OUTPATIENT
Start: 2022-09-26 | End: 2022-09-26 | Stop reason: HOSPADM

## 2022-09-26 RX ORDER — ONDANSETRON 4 MG/1
4 TABLET, ORALLY DISINTEGRATING ORAL EVERY 6 HOURS PRN
Status: DISCONTINUED | OUTPATIENT
Start: 2022-09-26 | End: 2022-09-26 | Stop reason: HOSPADM

## 2022-09-26 RX ORDER — PROTAMINE SULFATE 10 MG/ML
INJECTION, SOLUTION INTRAVENOUS PRN
Status: DISCONTINUED | OUTPATIENT
Start: 2022-09-26 | End: 2022-09-26

## 2022-09-26 RX ORDER — PROPOFOL 10 MG/ML
INJECTION, EMULSION INTRAVENOUS CONTINUOUS PRN
Status: DISCONTINUED | OUTPATIENT
Start: 2022-09-26 | End: 2022-09-26

## 2022-09-26 RX ORDER — ALBUTEROL SULFATE 0.83 MG/ML
2.5 SOLUTION RESPIRATORY (INHALATION) EVERY 4 HOURS PRN
Status: DISCONTINUED | OUTPATIENT
Start: 2022-09-26 | End: 2022-09-26 | Stop reason: HOSPADM

## 2022-09-26 RX ORDER — HYDROMORPHONE HYDROCHLORIDE 1 MG/ML
0.2 INJECTION, SOLUTION INTRAMUSCULAR; INTRAVENOUS; SUBCUTANEOUS EVERY 5 MIN PRN
Status: DISCONTINUED | OUTPATIENT
Start: 2022-09-26 | End: 2022-09-26 | Stop reason: HOSPADM

## 2022-09-26 RX ORDER — ONDANSETRON 2 MG/ML
INJECTION INTRAMUSCULAR; INTRAVENOUS PRN
Status: DISCONTINUED | OUTPATIENT
Start: 2022-09-26 | End: 2022-09-26

## 2022-09-26 RX ORDER — DEXAMETHASONE SODIUM PHOSPHATE 4 MG/ML
INJECTION, SOLUTION INTRA-ARTICULAR; INTRALESIONAL; INTRAMUSCULAR; INTRAVENOUS; SOFT TISSUE PRN
Status: DISCONTINUED | OUTPATIENT
Start: 2022-09-26 | End: 2022-09-26

## 2022-09-26 RX ORDER — PREDNISONE 5 MG/1
5 TABLET ORAL DAILY
Status: DISCONTINUED | OUTPATIENT
Start: 2022-09-26 | End: 2022-09-26 | Stop reason: HOSPADM

## 2022-09-26 RX ORDER — ACYCLOVIR 400 MG/1
400 TABLET ORAL 3 TIMES DAILY PRN
Qty: 15 TABLET | Refills: 2 | COMMUNITY
Start: 2022-09-26 | End: 2023-05-10

## 2022-09-26 RX ORDER — ONDANSETRON 2 MG/ML
4 INJECTION INTRAMUSCULAR; INTRAVENOUS EVERY 6 HOURS PRN
Status: DISCONTINUED | OUTPATIENT
Start: 2022-09-26 | End: 2022-09-26 | Stop reason: HOSPADM

## 2022-09-26 RX ORDER — DIPHENHYDRAMINE HYDROCHLORIDE 50 MG/ML
INJECTION INTRAMUSCULAR; INTRAVENOUS PRN
Status: DISCONTINUED | OUTPATIENT
Start: 2022-09-26 | End: 2022-09-26

## 2022-09-26 RX ORDER — SPIRONOLACTONE 50 MG/1
50 TABLET, FILM COATED ORAL DAILY
Status: DISCONTINUED | OUTPATIENT
Start: 2022-09-26 | End: 2022-09-26 | Stop reason: HOSPADM

## 2022-09-26 RX ORDER — PANTOPRAZOLE SODIUM 40 MG/1
40 TABLET, DELAYED RELEASE ORAL DAILY
Status: DISCONTINUED | OUTPATIENT
Start: 2022-09-26 | End: 2022-09-26 | Stop reason: HOSPADM

## 2022-09-26 RX ORDER — FLUTICASONE PROPIONATE 50 MCG
1-2 SPRAY, SUSPENSION (ML) NASAL DAILY PRN
Status: DISCONTINUED | OUTPATIENT
Start: 2022-09-26 | End: 2022-09-26 | Stop reason: HOSPADM

## 2022-09-26 RX ORDER — NICOTINE POLACRILEX 4 MG
15-30 LOZENGE BUCCAL
Status: DISCONTINUED | OUTPATIENT
Start: 2022-09-26 | End: 2022-09-26 | Stop reason: HOSPADM

## 2022-09-26 RX ORDER — ONDANSETRON 2 MG/ML
4 INJECTION INTRAMUSCULAR; INTRAVENOUS EVERY 30 MIN PRN
Status: DISCONTINUED | OUTPATIENT
Start: 2022-09-26 | End: 2022-09-26 | Stop reason: HOSPADM

## 2022-09-26 RX ORDER — POTASSIUM CHLORIDE 750 MG/1
40 TABLET, EXTENDED RELEASE ORAL
Status: DISCONTINUED | OUTPATIENT
Start: 2022-09-26 | End: 2022-09-26 | Stop reason: HOSPADM

## 2022-09-26 RX ORDER — SODIUM CHLORIDE 9 MG/ML
INJECTION, SOLUTION INTRAVENOUS CONTINUOUS
Status: DISCONTINUED | OUTPATIENT
Start: 2022-09-26 | End: 2022-09-26

## 2022-09-26 RX ORDER — MONTELUKAST SODIUM 10 MG/1
10 TABLET ORAL AT BEDTIME
Status: DISCONTINUED | OUTPATIENT
Start: 2022-09-26 | End: 2022-09-26 | Stop reason: HOSPADM

## 2022-09-26 RX ORDER — DEXTROSE MONOHYDRATE 25 G/50ML
25-50 INJECTION, SOLUTION INTRAVENOUS
Status: DISCONTINUED | OUTPATIENT
Start: 2022-09-26 | End: 2022-09-26 | Stop reason: HOSPADM

## 2022-09-26 RX ORDER — LIDOCAINE 40 MG/G
CREAM TOPICAL
Status: DISCONTINUED | OUTPATIENT
Start: 2022-09-26 | End: 2022-09-26 | Stop reason: HOSPADM

## 2022-09-26 RX ORDER — POTASSIUM CHLORIDE 750 MG/1
40 TABLET, EXTENDED RELEASE ORAL DAILY
Status: DISCONTINUED | OUTPATIENT
Start: 2022-09-26 | End: 2022-09-26 | Stop reason: HOSPADM

## 2022-09-26 RX ORDER — HEPARIN SODIUM 1000 [USP'U]/ML
INJECTION, SOLUTION INTRAVENOUS; SUBCUTANEOUS PRN
Status: DISCONTINUED | OUTPATIENT
Start: 2022-09-26 | End: 2022-09-26

## 2022-09-26 RX ADMIN — FENTANYL CITRATE 50 MCG: 50 INJECTION, SOLUTION INTRAMUSCULAR; INTRAVENOUS at 09:51

## 2022-09-26 RX ADMIN — FENTANYL CITRATE 50 MCG: 50 INJECTION, SOLUTION INTRAMUSCULAR; INTRAVENOUS at 10:19

## 2022-09-26 RX ADMIN — PROPOFOL 100 MCG/KG/MIN: 10 INJECTION, EMULSION INTRAVENOUS at 07:51

## 2022-09-26 RX ADMIN — PROTAMINE SULFATE 10 MG: 10 INJECTION, SOLUTION INTRAVENOUS at 09:00

## 2022-09-26 RX ADMIN — HEPARIN SODIUM 4000 UNITS: 1000 INJECTION, SOLUTION INTRAVENOUS; SUBCUTANEOUS at 08:48

## 2022-09-26 RX ADMIN — METHOCARBAMOL 750 MG: 500 TABLET ORAL at 10:32

## 2022-09-26 RX ADMIN — PROTAMINE SULFATE 70 MG: 10 INJECTION, SOLUTION INTRAVENOUS at 09:01

## 2022-09-26 RX ADMIN — DIPHENHYDRAMINE HYDROCHLORIDE 12.5 MG: 50 INJECTION, SOLUTION INTRAMUSCULAR; INTRAVENOUS at 08:01

## 2022-09-26 RX ADMIN — FENTANYL CITRATE 100 MCG: 50 INJECTION, SOLUTION INTRAMUSCULAR; INTRAVENOUS at 07:43

## 2022-09-26 RX ADMIN — HEPARIN SODIUM 8000 UNITS: 1000 INJECTION, SOLUTION INTRAVENOUS; SUBCUTANEOUS at 08:35

## 2022-09-26 RX ADMIN — Medication 10 MG: at 08:20

## 2022-09-26 RX ADMIN — DEXAMETHASONE SODIUM PHOSPHATE 4 MG: 4 INJECTION, SOLUTION INTRA-ARTICULAR; INTRALESIONAL; INTRAMUSCULAR; INTRAVENOUS; SOFT TISSUE at 08:03

## 2022-09-26 RX ADMIN — Medication 50 MG: at 07:43

## 2022-09-26 RX ADMIN — LIDOCAINE HYDROCHLORIDE 100 MG: 20 INJECTION, SOLUTION INFILTRATION; PERINEURAL at 07:43

## 2022-09-26 RX ADMIN — PROPOFOL 120 MG: 10 INJECTION, EMULSION INTRAVENOUS at 07:43

## 2022-09-26 RX ADMIN — ONDANSETRON 4 MG: 2 INJECTION INTRAMUSCULAR; INTRAVENOUS at 09:04

## 2022-09-26 RX ADMIN — CLINDAMYCIN PHOSPHATE 900 MG: 900 INJECTION, SOLUTION INTRAVENOUS at 07:58

## 2022-09-26 RX ADMIN — FENTANYL CITRATE 50 MCG: 50 INJECTION, SOLUTION INTRAMUSCULAR; INTRAVENOUS at 11:02

## 2022-09-26 RX ADMIN — SODIUM CHLORIDE: 9 INJECTION, SOLUTION INTRAVENOUS at 07:32

## 2022-09-26 RX ADMIN — Medication 10 MG: at 08:51

## 2022-09-26 RX ADMIN — OXYCODONE HYDROCHLORIDE 5 MG: 5 TABLET ORAL at 10:00

## 2022-09-26 RX ADMIN — SUGAMMADEX 200 MG: 100 INJECTION, SOLUTION INTRAVENOUS at 09:05

## 2022-09-26 RX ADMIN — FENTANYL CITRATE 50 MCG: 50 INJECTION, SOLUTION INTRAMUSCULAR; INTRAVENOUS at 10:03

## 2022-09-26 ASSESSMENT — ACTIVITIES OF DAILY LIVING (ADL)
ADLS_ACUITY_SCORE: 37

## 2022-09-26 ASSESSMENT — VISUAL ACUITY
OU: GLASSES
OU: GLASSES
OU: NORMAL ACUITY

## 2022-09-26 NOTE — DISCHARGE SUMMARY
[address] [address]  p: [phone]    Discharge Summary: Cardiology Service    Betty Tee MRN# [id number]   YOB: 1940 Age: 82 year old       Admission Date: 2022  Discharge Date: 2022      Discharge Diagnoses:  1. 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 S/p Watchman FLX on 22  3. HFpEF  4. ILD  5. HTN  6. HLD  7. Type II DM  8. PMR  9. Right hip pain  10. ANGELICA on CPAP    Pertinent Procedures:  1. Watchman     Consults:  None    Imaging with results:  TTE post procedure 2022:  Interpretation Summary  Trivial pericardial effusion is present.  No significant changes noted.    VERONICA intra procedure 2022:  Interpretation Summary  PROCEDURE:  Intra-procedural VERONICA for left atrial appendage closure with 24 mm WATCHMAN  FLEX device.     BASELINE SURVEY:  1. Normal left ventricular systolic function. LVEF 55 to 60%.  2. No intracardiac thrombus.  3. No pericardial effusion.  4. Maximal left atrial appendage orifice diameter 1.9 cm at45 degrees.     PROCEDURAL MONITORIN. Trans-septal puncture, guiding catheter placement and device delivery  performed under VERONICA guidance.  2. Stable device position without any significant device leak. Adequate  compression documented.     POST-PROCEDURAL SURVEY:  1. Left ventricular function is unchanged.  2. No pericardial effusion.    Brief HPI:  Betty Tee is a very pleasant 82 year old year old ffemale with a history of paroxysmal atrial fibrillation (on Xarelto), HFpEF, ILD, Sjogren's Syndrome, HTN, T2DM, severe hip OA, chronic pain, diverticulitis s/p colectomy  with subsequent take down  who presents  for Watchman left atrial appendage closure device. Patient underwent successful left atrial appendage closure with a 24mm WATCHMAN FLX device. Post procedure VERONICA showed well seated ZACHARY occluder without  leak or evidence of pericardial effusion. Right femoral venotomy closed with suture closure device.     Hospital Course by Diagnosis:  1. Paroxysmal a-fib with intolerance to AC  2. 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 Post-procedure VERONICA showed no evidence of benigno-device leak and no pericardial effusion. Patient seen in PACU. VSS, neuros intact, groin sites soft without hematoma. Currently in NSR/SB HR controlled.      - Recommend anticoagulation with Xarelto 15 mg daily (2/2 kidney function) x 45 days  - ASA 81 mg daily  - Echo this afternoon with no signs of leak; trivial pericardial effusion present  - Follow-up structural PAUL 1 week and 45 days post procedure  - 45 days following Watchman device implantation, patient will return for VERONICA to evaluate endothelialization of the device. If adequate seal is assessed (<5mm), Xarelto will be discontinued and dual antiplatelet therapy with clopidogrel and aspirin will be initiated to continue for total of 6 months from implantation date, with aspirin then continuing lifelong.     3. Chronic diastolic heart failure: Currently appears euvolemic, resume torsemide 40 mg a.m./30 mg p.m and spironolactone 50 mg daily.     4. Hypertension: Currently on sprionolactone.     5. Hyperlipidemia: Continue atorvastatin.     6. Type II DM: Insulin dependent with novolog and lantus. Also on Ozempic. Resume PTA regimen.      7. Severe right hip pain 2/2 OA: Continue current pain regimen per Dr. Alba. She will return to Dr. Shannon (orthopedist) post Watchman, will be okay to hold AC and undergo right hip replacement 45 days post watchman.      8. PMR: Continue daily prednisone      9. Sjogrens: Continue Cevimeline for dry mouth     10. Gout: PRN anakinra for gout     11. ANGELICA: Continue CPAP      Condition on discharge  Temp:  [97.3  F (36.3  C)-98  F (36.7  C)] 97.7  F (36.5  C)  Pulse:  [48-52] 52  Resp:  [9-20] 18  BP:  ()/(42-83) 120/64  SpO2:  [92 %-100 %] 97 %  General: Alert, interactive, NAD  Eyes: sclera anicteric, EOMI  Neck: no JVD, carotid 2+ bilaterally  Cardiovascular: regular rate and rhythm, normal S1 and S2, no murmurs, gallops, or rubs  Resp: clear to auscultation bilaterally, no rales, wheezes, or rhonchi  GI: Soft, nontender, nondistended. +BS.  No HSM or masses, no rebound or guarding.  Extremities: no edema, no cyanosis or clubbing, dorsalis pedis and posterior tibialis pulses 2+ bilaterally  Skin: Warm and dry, no jaundice or rash  Neuro: CN 2-12 intact, moves all extremities equally  Psych: Alert & oriented x 3    Medication Changes:  None    Discharge medications:   Current Discharge Medication List      CONTINUE these medications which have NOT CHANGED    Details   acetaminophen (TYLENOL) 500 MG tablet Take 1,000 mg by mouth every 8 hours as needed (max 6 tablets/24 hours, 2 tablets/dose)    Associated Diagnoses: Dizziness      acyclovir (ZOVIRAX) 400 MG tablet Take 1 tablet (400 mg) by mouth 3 times daily For a couple days  Qty: 15 tablet, Refills: 2    Associated Diagnoses: Recurrent cold sores      acyclovir (ZOVIRAX) 5 % external ointment Apply topically 6 times daily As needed for outbreaks  Qty: 15 g, Refills: 3    Associated Diagnoses: Recurrent cold sores      albuterol (PROAIR HFA/PROVENTIL HFA/VENTOLIN HFA) 108 (90 Base) MCG/ACT inhaler Inhale 2 puffs into the lungs every 6 hours  Qty: 18 g, Refills: 11    Comments: Pharmacy may dispense brand covered by insurance (Proair, or proventil or ventolin or generic albuterol inhaler)  Associated Diagnoses: ILD (interstitial lung disease) (H)      alendronate (FOSAMAX) 70 MG tablet Take 1 tablet (70 mg) by mouth every 7 days  Qty: 12 tablet, Refills: 0    Comments: DX Code Needed  REFILL NEEDED.  Associated Diagnoses: Osteopenia, unspecified location; Current chronic use of systemic steroids      anakinra (KINERET) 100 MG/0.67ML SOSY injection 100 mg  every day x 3 days as needed for flare ups  Qty: 6.7 mL, Refills: 3    Comments: PT OK WITH COPAY  Associated Diagnoses: Acute gout of left foot, unspecified cause; Seronegative rheumatoid arthritis (H)      atorvastatin (LIPITOR) 10 MG tablet TAKE 1/2 TABLET BY MOUTH EVERY DAY  Qty: 45 tablet, Refills: 2    Associated Diagnoses: Hyperlipidemia LDL goal <100      azithromycin (ZITHROMAX) 250 MG tablet TAKE 1 TABLET BY MOUTH EVERY DAY  Qty: 30 tablet, Refills: 5    Associated Diagnoses: Follicular bronchiolitis (H)      cevimeline (EVOXAC) 30 MG capsule Take 1 capsule (30 mg) by mouth 3 times daily  Qty: 270 capsule, Refills: 2    Associated Diagnoses: Sjogren's syndrome with lung involvement (H)      Cholecalciferol (VITAMIN D3) 50 MCG (2000 UT) CAPS TAKE 100 MCG BY MOUTH DAILY (TAKE 2 TABLET (50 MCG) BY MOUTH DAILY - ORAL)  Qty: 90 capsule, Refills: 1    Associated Diagnoses: Vitamin D deficiency      COMPOUNDED NON-CONTROLLED SUBSTANCE (CMPD RX) - PHARMACY TO MIX COMPOUNDED MEDICATION Estriol 1 mg/g Apply small amount to finger and apply to inside vagina daily for 2 weeks then twice weekly Route: vaginally Dispense 30 grams 11 refills  Qty: 30 g, Refills: 11    Associated Diagnoses: Atrophic vaginitis      cyanocobalamin (VITAMIN B-12) 1000 MCG tablet Take 1 tablet (1,000 mcg) by mouth daily  Qty: 30 tablet, Refills: 1    Associated Diagnoses: Vitamin B12 deficiency (non anemic)      escitalopram (LEXAPRO) 20 MG tablet Take 1 tablet (20 mg) by mouth daily  Qty: 90 tablet, Refills: 0    Comments: Please fill upon patient request.  Associated Diagnoses: Major depressive disorder, recurrent episode, moderate (H)      ferrous sulfate (FEROSUL) 325 (65 Fe) MG tablet TAKE 1 TABLET BY MOUTH EVERY DAY WITH BREAKFAST  Qty: 90 tablet, Refills: 0    Associated Diagnoses: Iron deficiency anemia, unspecified iron deficiency anemia type      fluticasone (FLONASE) 50 MCG/ACT nasal spray SPRAY 1-2 SPRAYS INTO BOTH NOSTRILS DAILY  AS NEEDED FOR ALLERGIES  Qty: 16 mL, Refills: 11    Associated Diagnoses: Seasonal allergic rhinitis due to pollen      fluticasone-vilanterol (BREO ELLIPTA) 100-25 MCG/INH inhaler Inhale 1 puff into the lungs daily  Qty: 1 each, Refills: 11    Associated Diagnoses: Follicular bronchiolitis (H)      hydroxychloroquine (PLAQUENIL) 200 MG tablet Take 2 tablets (400 mg) by mouth daily Get annual eye exams for hydroxychloroquine (Plaquenil) monitoring and fax to [phone]  Qty: 180 tablet, Refills: 3    Associated Diagnoses: Sjogren's syndrome with lung involvement (H)      insulin glargine (BASAGLAR KWIKPEN) 100 UNIT/ML pen INJECT 22 UNITS SUBCUTANEOUS DAILY (TO REPLACE LANTUS)  Qty: 15 mL, Refills: 1    Associated Diagnoses: Type 2 diabetes mellitus with hyperglycemia, with long-term current use of insulin (H)      ketoconazole (NIZORAL) 2 % external cream Apply topically 2 times daily as needed for itching  Qty: 60 g, Refills: 1    Associated Diagnoses: Cutaneous candidiasis      KLOR-CON 20 MEQ CR tablet TAKE 2 TABLETS (40 MEQ) BY MOUTH EVERY MORNING AND 1 TABLET (20 MEQ) EVERY EVENING.  Qty: 270 tablet, Refills: 3    Associated Diagnoses: (HFpEF) heart failure with preserved ejection fraction (H)      lidocaine (LIDODERM) 5 % patch APPLY PATCH TO PAINFUL AREA FOR UP TO 12 H WITHIN A 24 H PERIOD. REMOVE AFTER 12 HOURS.  Qty: 30 patch, Refills: 2    Comments: DX Code Needed  Sacral back pain [M53.3]  Associated Diagnoses: Sacral back pain      loratadine (CLARITIN) 10 MG tablet TAKE 1 TABLET BY MOUTH EVERY DAY  Qty: 90 tablet, Refills: 3    Associated Diagnoses: Seasonal allergic rhinitis due to pollen      methocarbamol (ROBAXIN) 750 MG tablet Take 1 tablet (750 mg) by mouth 3 times daily as needed for muscle spasms  Qty: 90 tablet, Refills: 3    Associated Diagnoses: Other chronic pain; Piriformis syndrome, right      metoprolol succinate ER (TOPROL-XL) 50 MG 24 hr tablet Take 1 tablet (50 mg) by mouth 2 times  daily  Qty: 90 tablet, Refills: 3    Associated Diagnoses: Permanent atrial fibrillation (H); Heart failure with preserved ejection fraction, unspecified HF chronicity (H)      montelukast (SINGULAIR) 10 MG tablet TAKE 1 TABLET BY MOUTH EVERYDAY AT BEDTIME  Qty: 90 tablet, Refills: 0    Comments: DX Code Needed  REFILL NEEDED.  Associated Diagnoses: Sjogren's syndrome with lung involvement (H); ILD (interstitial lung disease) (H)      NOVOLOG FLEXPEN 100 UNIT/ML soln INJECT 12 UNITS SUBCUTANEOUS 3 TIMES DAILY (WITH MEALS)  Qty: 15 mL, Refills: 1    Associated Diagnoses: Type 2 diabetes mellitus with other specified complication (H)      oxyCODONE-acetaminophen (PERCOCET) 5-325 MG tablet Take 1 tablet by mouth 2 times daily as needed for pain May fill and start on 9/21/2022. For chronic pain.  Qty: 20 tablet, Refills: 0    Associated Diagnoses: Osteoarthritis of right hip, unspecified osteoarthritis type; Chronic pain of right hip      OZEMPIC, 1 MG/DOSE, 4 MG/3ML SOPN INJECT 1 MG SUBCUTANEOUS ONCE A WEEK  Qty: 3 mL, Refills: 2    Associated Diagnoses: Type 2 diabetes mellitus with hyperglycemia, with long-term current use of insulin (H)      pantoprazole (PROTONIX) 40 MG EC tablet TAKE 1 TABLET (40 MG) BY MOUTH DAILY (REPLACES FAMOTIDINE- SHOULD STOP TAKING FAMOTIDINE)  Qty: 90 tablet, Refills: 0    Comments: DX Code Needed  .  Associated Diagnoses: Gastroesophageal reflux disease without esophagitis; History of lower GI bleeding      predniSONE (DELTASONE) 5 MG tablet Take 1 tablet (5 mg) by mouth daily  Qty: 90 tablet, Refills: 1    Associated Diagnoses: Acute gout of left foot, unspecified cause; Seronegative rheumatoid arthritis (H)      spironolactone (ALDACTONE) 25 MG tablet Take 2 tablets (50 mg) by mouth daily  Qty: 180 tablet, Refills: 3    Associated Diagnoses: Chronic diastolic congestive heart failure (H)      !! torsemide (DEMADEX) 10 MG tablet TAKE ONE TAB (10 MG) WITH ONE 20 MG TAB TO = 30 MG DAILY IN  AFTERNOON.  Qty: 90 tablet, Refills: 3    Comments: DX Code Needed Acute on chronic combined systolic and diastolic heart failure (H) [I50.43]  - Primary  Associated Diagnoses: Acute on chronic combined systolic and diastolic heart failure (H)      !! torsemide (DEMADEX) 20 MG tablet TAKE 2 TABLETS (40 MG) BY MOUTH EVERY MORNING AND 1 TABLET (20 MG) DAILY AT 2 PM. TAKE THE AFTERNOON DOSE WITH AN EXTRA 10 MG TAB FOR A TOTAL OF 30 MG IN THE AFTERNOON  Qty: 270 tablet, Refills: 3    Associated Diagnoses: (HFpEF) heart failure with preserved ejection fraction (H)      traZODone (DESYREL) 50 MG tablet Take 3 tablets (150 mg) by mouth nightly as needed for sleep  Qty: 135 tablet, Refills: 1    Associated Diagnoses: Insomnia due to medical condition      ACCU-CHEK SHANNON PLUS test strip USE TO TEST BLOOD SUGARS 3 TIMES DAILY  Qty: 300 strip, Refills: 5    Associated Diagnoses: Type 2 diabetes mellitus without complication, without long-term current use of insulin (H)      BD PEN NEEDLE JANE 2ND GEN 32G X 4 MM miscellaneous USE 4 DAILY AS DIRECTED.  Qty: 400 each, Refills: 1    Comments: DX Code Needed  .Type 2 diabetes mellitus with hyperglycemia, with long-term current use of insulin (H) [E11.65, Z79.4]  Associated Diagnoses: Type 2 diabetes mellitus without complication, without long-term current use of insulin (H)      blood glucose (NO BRAND SPECIFIED) lancets standard Use to test blood sugar 3 times daily or as directed  Qty: 100 lancet, Refills: 3    Comments: Fast click lancets  Associated Diagnoses: Type 2 diabetes mellitus with hyperglycemia, with long-term current use of insulin (H)      rivaroxaban ANTICOAGULANT (XARELTO ANTICOAGULANT) 20 MG TABS tablet TAKE 1 TABLET BY MOUTH DAILY WITH DINNER  Qty: 90 tablet, Refills: 3    Comments: Adding refills to previous script sent.  Associated Diagnoses: (HFpEF) heart failure with preserved ejection fraction (H); Permanent atrial fibrillation (H)       !! - Potential  duplicate medications found. Please discuss with provider.          Follow-up:  - Marina Soler CNP, in [facility], 1 week and 45 days for post Watchman follow up    Code status:  Full    LIZY Rasmussen, CNP  [facility] Cardiology      Time Spent on this Encounter   I, Kimberley Zavala CNP, personally saw the patient today and spent greater than 30 minutes discharging this patient.

## 2022-09-26 NOTE — ANESTHESIA POSTPROCEDURE EVALUATION
Patient: Betty Tee    Procedure: Procedure(s):  Left Atrial Appendage Closure       Anesthesia Type:  General    Note:  Disposition: Outpatient   Postop Pain Control: Uneventful            Sign Out: Well controlled pain   PONV: No   Neuro/Psych: Uneventful            Sign Out: Acceptable/Baseline neuro status   Airway/Respiratory: Uneventful            Sign Out: Acceptable/Baseline resp. status   CV/Hemodynamics: Uneventful            Sign Out: Acceptable CV status; No obvious hypovolemia; No obvious fluid overload   Other NRE: NONE   DID A NON-ROUTINE EVENT OCCUR? No           Last vitals:  Vitals Value Taken Time   /55 09/26/22 1100   Temp 36.3  C (97.3  F) 09/26/22 0930   Pulse 49 09/26/22 1114   Resp 8 09/26/22 1114   SpO2 98 % 09/26/22 1114   Vitals shown include unvalidated device data.    Electronically Signed By: Anum Dean MD  September 26, 2022  11:15 AM

## 2022-09-26 NOTE — DISCHARGE INSTRUCTIONS
[facility]    Interventional Cardiology  Discharge Instructions Left Atrial Appendage Closure Device     AFTER YOU GO HOME  If you were given sedation, for the first 24 hours: we recommend that an adult stay with you, DO NOT drive or operate machinery at home or at work, DO NOT smoke, DO NOT make any important or legal decisions.  DO NOT drink alcoholic beverages the day of your procedure  DO relax and take it easy for 24 hours  DO drink plenty of fluids  DO resume your regular diet, unless otherwise instructed by your Primary Physician  DO NOT take a shower for at least 12 hours following your procedure. No tub bath, hot tubs, or swimming for 5 days. Do NOT scrub the procedure site vigorously for 5 days.      Care of groin site  It is normal to have a small bruise or lump at the site.  For the first 2 days, when you cough, sneeze or move your bowels, hold your hand over the puncture site and press gently.  Do NOT lift more than 10 pounds or do any strenuous exercise for at least 3 to 5 days.  Do not use lotion or powder near the puncture site for 3 days.  If you start bleeding from the site in your groin: lie down flat and press firmly on the site. Call your doctor as soon as you can.   Call 911 right away if you have: Heavy bleeding, bleeding that does not stop.      CALL THE PHYSICIAN IF:  You start bleeding from the procedure site.  You have numbness, coolness or change in color of the arm or leg of the puncture site  You experience changes in your vision, hearing, balance, coordination, speech, thinking or memory  You experience weakness in one or more extremity  You experience pain or redness at the puncture site  You develop nausea or vomiting  You develop hives or a rash or unexplained itching  You develop a temperature of 101 degrees F or greater      Stroke - Call 911    Remember: BE FAST       BALANCE--Sudden loss of balance or coordination. Example: trouble walking     EYES--Sudden problem  seeing out of one or both eyes     FACE--Sudden numbness or change to one side of the face. Examples: facial droop, uneven smile     ARM--Sudden numbness or weakness in one arm or leg     SPEECH--Sudden changes in speech or talking that doesn t make sense     TIME--if the person is having any of the symptoms above, CALL 911 immediately.      Symptoms may go away, then come back.     Sudden, worst headache of your life      [facility] INTERVENTIONAL Cardiology DEPARTMENT  Procedure Physician:  Dr. Crews  Date of procedure: September 26, 2022  Telephone Numbers: [phone]

## 2022-09-26 NOTE — PROGRESS NOTES
Per MELLISA Zavala patient okay to discharge home post watchman placement, patient transported to front door via wheelchair.

## 2022-09-26 NOTE — ANESTHESIA CARE TRANSFER NOTE
Patient: Betty Tee    Procedure: Procedure(s):  Left Atrial Appendage Closure       Diagnosis: JAMI  Diagnosis Additional Information: No value filed.    Anesthesia Type:   General     Note:    Oropharynx: oropharynx clear of all foreign objects and spontaneously breathing  Level of Consciousness: awake  Oxygen Supplementation: face mask  Level of Supplemental Oxygen (L/min / FiO2): 6  Independent Airway: airway patency satisfactory and stable  Dentition: dentition unchanged  Vital Signs Stable: post-procedure vital signs reviewed and stable  Report to RN Given: handoff report given  Patient transferred to: PACU    Handoff Report: Identifed the Patient, Identified the Reponsible Provider, Reviewed the pertinent medical history, Discussed the surgical course, Reviewed Intra-OP anesthesia mangement and issues during anesthesia, Set expectations for post-procedure period and Allowed opportunity for questions and acknowledgement of understanding      Vitals:  Vitals Value Taken Time   /61    Temp 97.3    Pulse 50 09/26/22 0932   Resp 41 09/26/22 0932   SpO2 98 % 09/26/22 0932   Vitals shown include unvalidated device data.    Electronically Signed By: LIZY YOUNGBLOOD CRNA  September 26, 2022  9:34 AM

## 2022-09-26 NOTE — Clinical Note
0 : 20.6. 45 : 19. 90 : 19. 135 : 16. 0 : 18. 45 : 31. 90 : 25. 135 : 20. 0 : 20 . 45 : 19 . 90 : 19 . 135 : 19 . ZACHARY type used: BroccoliPosition: Passed. [place]: Passed. Size: Accepted. Seal: Complete. Jet: 0.

## 2022-09-26 NOTE — PROGRESS NOTES
D/I/A: Pt roomed on 3C in bay 33.  Arrived via litter and accompanied by PACU RN On/Off: On monitor.  VSSA.  Rhythm upon arrival Sinus bradycardic on monitor.  Complaining of R hip pain, was treated in PACU shortly prior to arrival, refuses repositioning at this time. No SOB.  Reviewed activity restrictions and when to notify RN, ie-changes to breathing or increased chest pressure or chest pain.  CCL access:  R groin, dressed with primapore, small drainage marked prior to care, no active bleeding or hematoma.  P: Continue to monitor status.  Discharge to home once meeting criteria.      Pt wanting to sleep, SATS dropping to mid 80s, pt has sleep apnea and usually wears CPAP, placed on 2L O2 via NC, SATS in the 90s.

## 2022-09-26 NOTE — TELEPHONE ENCOUNTER
Called to remind patient of their upcoming appointment with our GI clinic, on Thursday 10/6/2022 at 10:15AM with Dr. Dias. This appointment is scheduled as an in-person appt. Please arrive 15 minutes early to check in for your appointment. , if your appointment is virtual (video or telephone) you need to be in [place] for the visit. To reschedule or cancel patient to call [phone].    Katy Mckenna MA

## 2022-09-26 NOTE — ANESTHESIA PROCEDURE NOTES
Perioperative VERONICA Procedure Note    Staff -        Anesthesiologist:  Jerman Suggs MD       Performed By: anesthesiologist  Preanesthesia Checklist:  Patient identified, IV assessed, risks and benefits discussed, monitors and equipment assessed, procedure being performed at surgeon's request and anesthesia consent obtained.    VERONICA Probe Insertion    Probe Status PRE Insertion: NO obvious damage  Probe type:  Adult 3D  Bite block used:   Molar and Soft  Insertion Technique: Easy, no oropharyngeal manipulation  Insertion complications: None obvious  Billing Report: A VERONICA report is being generated by the cardiology department.  Probe Status POST Removal: NO obvious damage

## 2022-09-26 NOTE — ANESTHESIA PROCEDURE NOTES
Airway       Patient location during procedure: OR       Procedure Start/Stop Times: 9/26/2022 7:50 AM  Staff -        CRNA: Manny Juarez APRN CRNA       Performed By: CRNA  Consent for Airway        Urgency: elective  Indications and Patient Condition       Indications for airway management: benigno-procedural       Induction type:intravenous       Mask difficulty assessment: 2 - vent by mask + OA or adjuvant +/- NMBA    Final Airway Details       Final airway type: endotracheal airway       Successful airway: ETT - single and Oral  Endotracheal Airway Details        ETT size (mm): 7.0       Cuffed: yes       Successful intubation technique: direct laryngoscopy       DL Blade Type: MAC 4       Grade View of Cords: 1       Adjucts: stylet       Position: Right       Measured from: gums/teeth       Secured at (cm): 21       Bite block used: None    Post intubation assessment        Placement verified by: capnometry, equal breath sounds and chest rise        Number of attempts at approach: 1       Secured with: silk tape       Ease of procedure: easy       Dentition: Intact and Unchanged    Medication(s) Administered   Medication Administration Time: 9/26/2022 7:50 AM

## 2022-09-26 NOTE — ANESTHESIA PREPROCEDURE EVALUATION
Anesthesia Pre-Procedure Evaluation    Patient: Betty Tee   MRN: [id number] : 1940        Procedure : Procedure(s):  Left Atrial Appendage Closure          Past Medical History:   Diagnosis Date     Alcohol abuse, in remission      Allergic rhinitis, cause unspecified     allegra helps when she takes it     Antiplatelet or antithrombotic long-term use      Atrial fibrillation (H)     in hosp in  after surgery w/ fluid overload     Cardiomegaly     LVH on stress echo- cardiac w/u at [facility] ER- neg CT scan for PE, neg stress echo in      Chest pain, unspecified      Congestive heart failure (H)      Depressive disorder      Diabetes (H)      Disorder of bone and cartilage, unspecified     osteopenia (had been on prempro), improved on  dexa, stable dexa 11/10     Diverticulosis of colon (without mention of hemorrhage)     last episode yrs ago     Essential hypertension, benign      Follicular bronchiolitis (H)     associated with Sjogrens, dx by chest CT showing mosaic attenuation and air trapping     Gastro-oesophageal reflux disease      ILD (interstitial lung disease) (H)     associated with Sjogrens, also has mildly elevated IgG4, first noted on chest CT  (mild changes) and also has small airways disease; ILD improved on follow up chest CT 2018.     Insomnia, unspecified     weaned off clonazepam     Irregular heart beat      Lumbago     MRI with NEAL, now seeing Dr. Cain for sciatic sx's     Major depressive disorder, recurrent episode, moderate (H)      Obstructive sleep apnea     uses cpap     Osteoarthrosis, unspecified whether generalized or localized, unspecified site      Sjogren's syndrome (H)     + RG and SSA and lip bx     Sleep apnea      Tobacco use disorder     chantix in , started again in , working      Past Surgical History:   Procedure Laterality Date     APPENDECTOMY       BACK SURGERY       BACK SURGERY       BIOPSY BREAST  2002    Biopsy  Left Breast     BIOPSY BREAST Left 09/27/2002     BREAST SURGERY       CARDIAC SURGERY       CARDIAC SURGERY       CHOLECYSTECTOMY  1990's?     CHOLECYSTECTOMY       COLECTOMY LEFT  11/07/2011    Procedure:COLECTOMY LEFT; Laparoscopic mobilization of splenic flexture, sigmoid colectomy, coloprotoscopy, loop illeostomy; Surgeon:CK CASTANEDA; Location:[facility] OR     COLECTOMY LEFT  11/07/2011     COLONOSCOPY  1990,s     COLONOSCOPY N/A 5/3/2022    Procedure: COLONOSCOPY;  Surgeon: Guru Winsome Dias MD;  Location: [facility]     ENT SURGERY       EYE SURGERY  2012     FLEXIBLE SIGMOIDOSCOPY  11/03/2011     HYSTERECTOMY TOTAL ABDOMINAL, BILATERAL SALPINGO-OOPHORECTOMY, COMBINED  11/07/2011    Procedure:COMBINED HYSTERECTOMY TOTAL ABDOMINAL, BILATERAL SALPINGO-OOPHORECTOMY; total abdominal hysterectomy, bilateral salpingo-oophorectomy; Surgeon:ALETA MANUEL; Location:[facility] OR     HYSTERECTOMY TOTAL ABDOMINAL, BILATERAL SALPINGO-OOPHORECTOMY, COMBINED  11/07/2011     ILEOSTOMY  02/01/2012    takedown loop ileostomy      INSERT STENT URETER  11/07/2011    Procedure:INSERT STENT URETER; Placement of Bilateral Ureteral Stents ; Surgeon:PRANEETH BRYANT; Location:[facility] OR     SIGMOIDECTOMY  02/01/2012    Dr. Castaneda, Sigmoidectomy for diverticular abscess, iliostomy afterwards until repair     SIGMOIDOSCOPY FLEXIBLE  11/03/2011    Procedure:SIGMOIDOSCOPY FLEXIBLE; Flexible Sigmoidoscopy; Surgeon:CK CASTANEDA; Location:[facility] OR     TAKEDOWN ILEOSTOMY  02/01/2012    Procedure:TAKEDOWN ILEOSTOMY; Takedown Loop Ileostomy ; Surgeon:CK CASTANEDA; Location:[facility] OR     URETERAL STENT PLACEMENT  11/07/2011     ZZC APPENDECTOMY  1970's?     ZZC NONSPECIFIC PROCEDURE  11/2005    exploratory abd lap, adhesions, resolved RLQ pain, diverticulitis episodes      Allergies   Allergen Reactions     Amoxicillin-Pot Clavulanate      Augmentin Nausea and Vomiting     Codeine Nausea and Vomiting     PN: LW Reaction: HIVES      Penicillins Nausea and Vomiting     PN: LW Reaction: GI Upset     Phenobarbital Itching     Seasonal Allergies       Social History     Tobacco Use     Smoking status: Former Smoker     Packs/day: 0.50     Years: 10.00     Pack years: 5.00     Types: Cigarettes     Quit date: 2011     Years since quittin.1     Smokeless tobacco: Never Used     Tobacco comment:  ppd   Substance Use Topics     Alcohol use: No     Alcohol/week: 0.0 standard drinks     Comment: In recovery beginning       Wt Readings from Last 1 Encounters:   22 84.7 kg (186 lb 11.7 oz)        Anesthesia Evaluation   Pt has had prior anesthetic. Type: General and MAC.    History of anesthetic complications  - PONV.      ROS/MED HX  ENT/Pulmonary:     (+) sleep apnea, doesn't use CPAP,     Neurologic: Comment: Interstitial lung disease      Cardiovascular:     (+) hypertension-----Taking blood thinners Pt has received instructions: CHF Irregular Heartbeat/Palpitations,  (-) murmur   METS/Exercise Tolerance:     Hematologic:  - neg hematologic  ROS     Musculoskeletal: Comment: Chronic hip pain relegating her to a wheelchair      GI/Hepatic: Comment: Multiple EGD's in the past, last .  Denies any symptoms consistent with GI bleeding  Hepatic steatosis    (+) GERD, Asymptomatic on medication, liver disease,     Renal/Genitourinary:     (+) renal disease, type: CRI, Pt does not require dialysis,     Endo:     (+) type II DM, Using insulin, - not using insulin pump. not previously admitted for DM/DKA.     Psychiatric/Substance Use:     (+) psychiatric history depression alcohol abuse     Infectious Disease:  - neg infectious disease ROS     Malignancy:  - neg malignancy ROS     Other:            Physical Exam    Airway        Mallampati: II   TM distance: > 3 FB   Neck ROM: full   Mouth opening: > 3 cm    Respiratory Devices and Support         Dental     Comment: Teeth are in poor repair        Cardiovascular          Rhythm and  rate: regular and normal (-) no murmur    Pulmonary           breath sounds clear to auscultation           OUTSIDE LABS:  CBC:   Lab Results   Component Value Date    WBC 6.6 09/23/2022    WBC 7.6 08/23/2022    HGB 12.0 09/23/2022    HGB 12.3 08/23/2022    HCT 37.5 09/23/2022    HCT 39.0 08/23/2022     09/23/2022     08/23/2022     BMP:   Lab Results   Component Value Date     09/23/2022     08/23/2022    POTASSIUM 3.5 09/23/2022    POTASSIUM 4.1 08/23/2022    CHLORIDE 102 09/23/2022    CHLORIDE 107 08/23/2022    CO2 25 09/23/2022    CO2 22 08/23/2022    BUN 15.0 09/23/2022    BUN 17 08/23/2022    CR 1.17 (H) 09/23/2022    CR 1.08 (H) 08/23/2022     (H) 09/26/2022     (H) 09/23/2022     COAGS:   Lab Results   Component Value Date    PTT 35 10/06/2011    INR 1.37 (H) 09/23/2022     POC:   Lab Results   Component Value Date     (H) 10/01/2020     HEPATIC:   Lab Results   Component Value Date    ALBUMIN 3.2 (L) 08/23/2022    PROTTOTAL 6.6 (L) 08/23/2022    ALT 14 08/23/2022    AST 13 08/23/2022    ALKPHOS 80 08/23/2022    BILITOTAL 0.4 08/23/2022     OTHER:   Lab Results   Component Value Date    PH 7.38 04/06/2017    LACT 2.3 (H) 04/06/2017    A1C 6.4 (H) 08/23/2022    CLAUDY 9.6 09/23/2022    PHOS 3.4 04/11/2017    MAG 2.0 04/11/2017    LIPASE 136 04/02/2017    TSH 2.22 12/09/2021    CRP 10.7 (H) 07/08/2022    SED 30 07/08/2022       Anesthesia Plan    ASA Status:  3   NPO Status:  NPO Appropriate    Anesthesia Type: General.     - Airway: ETT   Induction: Intravenous.   Maintenance: TIVA.   Techniques and Equipment:     - Lines/Monitors: 2nd IV, BIS, VERONICA            VERONICA Absolute Contra-indication: NONE            VERONICA Relative Contra-indication: NONE     - Blood: T&S     Consents    Anesthesia Plan(s) and associated risks, benefits, and realistic alternatives discussed. Questions answered and patient/representative(s) expressed understanding.    - Discussed:     - Discussed  with:  Patient      - Extended Intubation/Ventilatory Support Discussed: No.      - Patient is DNR/DNI Status: No    Use of blood products discussed: No .     Postoperative Care    Pain management: IV analgesics, Oral pain medications.   PONV prophylaxis: Ondansetron (or other 5HT-3)     Comments:    Other Comments: Patient seen and examined.  Risks, benefits and alternatives to GETA with VERONICA discussed.  Questions answered and patient wishes to proceed            Jerman Suggs MD

## 2022-09-26 NOTE — PROGRESS NOTES
D/I/A:  Patient is tolerating liquids and foods, ambulating, urinating, puncture sites are stable (no bleeding and no hematoma) and patient has a .  A+O x4 and making needs known.  CCL access sites C/D/I; no bleeding or hematoma; CMS intact.  VSSA.  Sinus bradycardic on monitor.  IV access removed.  Education completed and outlined in AVS or handout: medications reviewed with patient.  Questions answered prior to discharge.  Belongings returned to patient at discharge.      Awaiting discharge order and ECHO results at this time.

## 2022-09-27 ENCOUNTER — PATIENT OUTREACH (OUTPATIENT)
Dept: CARE COORDINATION | Facility: CLINIC | Age: 82
End: 2022-09-27

## 2022-09-27 DIAGNOSIS — I50.32 CHRONIC HEART FAILURE WITH PRESERVED EJECTION FRACTION (H): Primary | ICD-10-CM

## 2022-09-27 LAB
DHC UR CFM-MCNC: 299 NG/ML
DHC/CREAT UR: 854 NG/MG {CREAT}
HYDROCODONE UR CFM-MCNC: 660 NG/ML
HYDROCODONE/CREAT UR: 1886 NG/MG {CREAT}
HYDROMORPHONE UR CFM-MCNC: 59 NG/ML
HYDROMORPHONE/CREAT UR: 169 NG/MG {CREAT}

## 2022-09-27 NOTE — PROGRESS NOTES
Clinic Care Coordination Contact  [facility]: Post-Discharge Note  SITUATION                                                      Admission:    Admission Date: 09/26/22   Reason for Admission: 1. 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 S/p Watchman FLX on 9/26/22  3. HFpEF  4. ILD  5. HTN  6. HLD  7. Type II DM  8. PMR  9. Right hip pain  10. ANGELICA on CPAP  Discharge:   Discharge Date: 09/26/22  Discharge Diagnosis: 1. 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 S/p Watchman FLX on 9/26/22  3. HFpEF  4. ILD  5. HTN  6. HLD  7. Type II DM  8. PMR  9. Right hip pain  10. ANGELICA on CPAP    BACKGROUND                                                      Per hospital discharge summary and inpatient provider notes:    Brief HPI:  Betty Tee is a very pleasant 82 year old year old ffemale with a history of paroxysmal atrial fibrillation (on Xarelto), HFpEF, ILD, Sjogren's Syndrome, HTN, T2DM, severe hip OA, chronic pain, diverticulitis s/p colectomy 2011 with subsequent take down  who presents 9/26 for Watchman left atrial appendage closure device. Patient underwent successful left atrial appendage closure with a 24mm WATCHMAN FLX device. Post procedure VERONICA showed well seated ZACHARY occluder without leak or evidence of pericardial effusion. Right femoral venotomy closed with suture closure device.      Hospital Course by Diagnosis:  1. Paroxysmal a-fib with intolerance to AC  2. 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 Post-procedure VERONICA showed no evidence of benigno-device leak and no pericardial effusion. Patient seen in PACU. VSS, neuros intact, groin sites soft without hematoma. Currently in NSR/SB HR controlled.      - Recommend anticoagulation with Xarelto 15 mg daily (2/2 kidney function) x 45 days  - ASA 81 mg daily  - Echo this afternoon with no signs of leak; trivial  "pericardial effusion present  - Follow-up structural PAUL 1 week and 45 days post procedure  - 45 days following Watchman device implantation, patient will return for VERONICA to evaluate endothelialization of the device. If adequate seal is assessed (<5mm), Xarelto will be discontinued and dual antiplatelet therapy with clopidogrel and aspirin will be initiated to continue for total of 6 months from implantation date, with aspirin then continuing lifelong.     3. Chronic diastolic heart failure: Currently appears euvolemic, resume torsemide 40 mg a.m./30 mg p.m and spironolactone 50 mg daily.     4. Hypertension: Currently on sprionolactone.     5. Hyperlipidemia: Continue atorvastatin.     6. Type II DM: Insulin dependent with novolog and lantus. Also on Ozempic. Resume PTA regimen.      7. Severe right hip pain 2/2 OA: Continue current pain regimen per Dr. Alba. She will return to Dr. Shannon (orthopedist) post Watchman, will be okay to hold AC and undergo right hip replacement 45 days post watchman.      8. PMR: Continue daily prednisone      9. Sjogrens: Continue Cevimeline for dry mouth     10. Gout: PRN anakinra for gout     11. ANGELICA: Continue CPAP    ASSESSMENT           Discharge Assessment  How are you doing now that you are home?: Pt states doing well, \"slight discomfort in the groin area but that's it, no big deal.\"  States she has a family situation she needs to attend to and had to get off the phone, \"but I'm doing fine, everything is fine and no problems.\"  How are your symptoms? (Red Flag symptoms escalate to triage hotline per guidelines): Improved  Do you feel your condition is stable enough to be safe at home until your provider visit?: Yes  Does the patient have their discharge instructions? : Yes  Does the patient have questions regarding their discharge instructions? : Yes (see comment)  Were you started on any new medications or were there changes to any of your previous medications? : Yes (baby " "ASA, pt confirms she's started this)  Does the patient have all of their medications?: Yes  Do you have questions regarding any of your medications? : No  Discharge follow-up appointment scheduled within 14 calendar days? : Yes  Discharge Follow Up Appointment Date: 10/05/22  Discharge Follow Up Appointment Scheduled with?: Specialty Care Provider (Cardiology)         Post-op (Clinicians Only)  Did the patient have surgery or a procedure: Yes (s/p Watchmann FLX on 9/26/22)  Incision: closed (Pt states groin site intact \"in fact I can hardly see where the puncture was.\")  Drainage: No  Bleeding: none  Fever: No  Chills: No  Redness: No  Warmth: No  Swelling: No  Incision site pain: No (Pt states \"slight discomfort but no big deal.\")  Eating & Drinking: eating and drinking without complaints/concerns  PO Intake: regular diet      PLAN                                                      Outpatient Plan:      Follow up with Marina Soler CNP, at [facility], in 1 week and in 45 days, for post Watchman device follow up    Future Appointments   Date Time Provider Department Center   10/5/2022 12:30 PM [facility]   10/5/2022  1:00 PM Radha Rosa MD [facility]   10/6/2022 10:30 AM Guru Winsome Dias MD [facility]   10/6/2022 11:00 AM Marina Soler, MELLISA [facility]   11/29/2022  1:00 PM Ilene Tristan MD [facility]   12/16/2022  2:00 PM Alanna Alba MD [facility]   1/13/2023  3:30 PM Zulma Lopez MD [facility]         For any urgent concerns, please contact our 24 hour nurse triage line: [phone] ([phone])         Sharron Colon RN                "

## 2022-10-04 NOTE — PROGRESS NOTES
STRUCT[facility]ARE  CARDIOVASCULAR DIVISION    STRUCTURAL CLINIC RETURN VISIT    PRIMARY CARDIOLOGIST: Dr. Rosa      PERTINENT CLINICAL HISTORY:     Betty Tee is a very pleasant 82 year old female who presents for 1 week Watchman follow-up. 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 Right femoral venotomy closed with suture closure device. Her post procedure VERONICA showed well seated closure device without significant leak or evidence of pericardial effusion. She was discharged on Xarelto and ASA.     She reports feeling well since the procedure and has no specific cardiovascular concerns. She is tolerating anticoagulation without bleeding concerns. Groin site is healing well. Additionally she denies any chest pain, shortness of breath, orthopnea, PND, leg swelling, weight gain, palpitations, lightheadedness or syncope. Activity is mainly limited by severe right hip pain. She follows with the pain clinic. She has plans to undergo right hip replacement following her 45 days of anticoagulation.      PAST MEDICAL HISTORY:     Past Medical History:   Diagnosis Date     Alcohol abuse, in remission      Allergic rhinitis, cause unspecified     allegra helps when she takes it     Antiplatelet or antithrombotic long-term use      Atrial fibrillation (H)     in hosp in 11/11 after surgery w/ fluid overload     Cardiomegaly     LVH on stress echo- cardiac w/u at [facility] ER- neg CT scan for PE, neg stress echo in 8/06     Chest pain, unspecified      Congestive heart failure (H)      Depressive disorder      Diabetes (H)      Disorder of bone and cartilage, unspecified     osteopenia (had  been on prempro), improved on 6/06 dexa, stable dexa 11/10     Diverticulosis of colon (without mention of hemorrhage)     last episode yrs ago     Essential hypertension, benign      Follicular bronchiolitis (H)     associated with Sjogrens, dx by chest CT showing mosaic attenuation and air trapping     Gastro-oesophageal reflux disease      ILD (interstitial lung disease) (H)     associated with Sjogrens, also has mildly elevated IgG4, first noted on chest CT 2015 (mild changes) and also has small airways disease; ILD improved on follow up chest CT 2018.     Insomnia, unspecified     weaned off clonazepam     Irregular heart beat      Lumbago 7/09    MRI with DJD, now seeing Dr. Cain for sciatic sx's     Major depressive disorder, recurrent episode, moderate (H)      Obstructive sleep apnea     uses cpap     Osteoarthrosis, unspecified whether generalized or localized, unspecified site      Sjogren's syndrome (H)     + RG and SSA and lip bx     Sleep apnea      Tobacco use disorder     chantix in 9/07, started again in 6/08, working        PAST SURGICAL HISTORY:     Past Surgical History:   Procedure Laterality Date     APPENDECTOMY       BACK SURGERY  1962     BACK SURGERY  1962     BIOPSY BREAST  09/27/2002    Biopsy Left Breast     BIOPSY BREAST Left 09/27/2002     BREAST SURGERY       CARDIAC SURGERY       CARDIAC SURGERY       CHOLECYSTECTOMY  1990's?     CHOLECYSTECTOMY       COLECTOMY LEFT  11/07/2011    Procedure:COLECTOMY LEFT; Laparoscopic mobilization of splenic flexture, sigmoid colectomy, coloprotoscopy, loop illeostomy; Surgeon:CK CASTANEDA; Location: OR     COLECTOMY LEFT  11/07/2011     COLONOSCOPY  1990,s     COLONOSCOPY N/A 5/3/2022    Procedure: COLONOSCOPY;  Surgeon: Guru Winsome Dias MD;  Location:  GI     CV LEFT ATRIAL APPENDAGE CLOSURE N/A 9/26/2022    Procedure: Left Atrial Appendage Closure;  Surgeon: Uzair Crews MD;  Location:  HEART CARDIAC CATH  LAB     ENT SURGERY       EYE SURGERY  2012     FLEXIBLE SIGMOIDOSCOPY  11/03/2011     HYSTERECTOMY TOTAL ABDOMINAL, BILATERAL SALPINGO-OOPHORECTOMY, COMBINED  11/07/2011    Procedure:COMBINED HYSTERECTOMY TOTAL ABDOMINAL, BILATERAL SALPINGO-OOPHORECTOMY; total abdominal hysterectomy, bilateral salpingo-oophorectomy; Surgeon:ALETA MANUEL; Location:[facility] OR     HYSTERECTOMY TOTAL ABDOMINAL, BILATERAL SALPINGO-OOPHORECTOMY, COMBINED  11/07/2011     ILEOSTOMY  02/01/2012    takedown loop ileostomy      INSERT STENT URETER  11/07/2011    Procedure:INSERT STENT URETER; Placement of Bilateral Ureteral Stents ; Surgeon:PRANEETH BRYANT; Location:[facility] OR     SIGMOIDECTOMY  02/01/2012    Dr. Siddiqi, Sigmoidectomy for diverticular abscess, iliostomy afterwards until repair     SIGMOIDOSCOPY FLEXIBLE  11/03/2011    Procedure:SIGMOIDOSCOPY FLEXIBLE; Flexible Sigmoidoscopy; Surgeon:CK SIDDIQI; Location:[facility] OR     TAKEDOWN ILEOSTOMY  02/01/2012    Procedure:TAKEDOWN ILEOSTOMY; Takedown Loop Ileostomy ; Surgeon:CK SIDDIQI; Location:[facility] OR     URETERAL STENT PLACEMENT  11/07/2011     [facility] APPENDECTOMY  1970's?     [facility] NONSPECIFIC PROCEDURE  11/2005    exploratory abd lap, adhesions, resolved RLQ pain, diverticulitis episodes        CURRENT MEDICATIONS:     Current Outpatient Medications   Medication Sig Dispense Refill     ACCU-CHEK SHANNON PLUS test strip USE TO TEST BLOOD SUGARS 3 TIMES DAILY 300 strip 5     acetaminophen (TYLENOL) 500 MG tablet Take 1,000 mg by mouth every 8 hours as needed (max 6 tablets/24 hours, 2 tablets/dose)       acyclovir (ZOVIRAX) 400 MG tablet Take 1 tablet (400 mg) by mouth 3 times daily as needed For a couple days 15 tablet 2     acyclovir (ZOVIRAX) 5 % external ointment Apply topically as needed (6 times day PRN for outbreaks) As needed for outbreaks 15 g 3     albuterol (PROAIR HFA/PROVENTIL HFA/VENTOLIN HFA) 108 (90 Base) MCG/ACT inhaler Inhale 2 puffs into the lungs every 6 hours 18 g 11      alendronate (FOSAMAX) 70 MG tablet Take 1 tablet (70 mg) by mouth every 7 days 12 tablet 0     anakinra (KINERET) 100 MG/0.67ML SOSY injection 100 mg every day x 3 days as needed for flare ups 6.7 mL 3     aspirin (ASA) 81 MG EC tablet Take 1 tablet (81 mg) by mouth daily 90 tablet 1     atorvastatin (LIPITOR) 10 MG tablet TAKE 1/2 TABLET BY MOUTH EVERY DAY 45 tablet 2     azithromycin (ZITHROMAX) 250 MG tablet TAKE 1 TABLET BY MOUTH EVERY DAY 30 tablet 5     BD PEN NEEDLE JANE 2ND GEN 32G X 4 MM miscellaneous USE 4 DAILY AS DIRECTED. 400 each 1     blood glucose (NO BRAND SPECIFIED) lancets standard Use to test blood sugar 3 times daily or as directed 100 lancet 3     cevimeline (EVOXAC) 30 MG capsule Take 1 capsule (30 mg) by mouth 3 times daily (Patient taking differently: Take 30 mg by mouth twice a week On Tuesdays and Fridays) 270 capsule 2     Cholecalciferol (VITAMIN D3) 50 MCG (2000 UT) CAPS TAKE 100 MCG BY MOUTH DAILY (TAKE 2 TABLET (50 MCG) BY MOUTH DAILY - ORAL) 90 capsule 1     COMPOUNDED NON-CONTROLLED SUBSTANCE (CMPD RX) - PHARMACY TO MIX COMPOUNDED MEDICATION Estriol 1 mg/g Apply small amount to finger and apply to inside vagina daily for 2 weeks then twice weekly Route: vaginally Dispense 30 grams 11 refills (Patient taking differently: Estriol 1 mg/g Apply small amount to finger and apply to inside vagina twice weekly Route: vaginally Dispense 30 grams 11 refills) 30 g 11     cyanocobalamin (VITAMIN B-12) 1000 MCG tablet Take 1 tablet (1,000 mcg) by mouth daily 30 tablet 1     escitalopram (LEXAPRO) 20 MG tablet Take 1 tablet (20 mg) by mouth daily 90 tablet 0     ferrous sulfate (FEROSUL) 325 (65 Fe) MG tablet TAKE 1 TABLET BY MOUTH EVERY DAY WITH BREAKFAST 90 tablet 0     fluticasone (FLONASE) 50 MCG/ACT nasal spray SPRAY 1-2 SPRAYS INTO BOTH NOSTRILS DAILY AS NEEDED FOR ALLERGIES 16 mL 11     fluticasone-vilanterol (BREO ELLIPTA) 100-25 MCG/INH inhaler Inhale 1 puff into the lungs daily 1  each 11     hydroxychloroquine (PLAQUENIL) 200 MG tablet Take 2 tablets (400 mg) by mouth daily Get annual eye exams for hydroxychloroquine (Plaquenil) monitoring and fax to [phone] 180 tablet 3     insulin glargine (BASAGLAR KWIKPEN) 100 UNIT/ML pen INJECT 22 UNITS SUBCUTANEOUS DAILY (TO REPLACE LANTUS) (Patient taking differently: INJECT 24 UNITS SUBCUTANEOUS DAILY (TO REPLACE LANTUS)) 15 mL 1     ketoconazole (NIZORAL) 2 % external cream Apply topically 2 times daily as needed for itching 60 g 1     KLOR-CON 20 MEQ CR tablet TAKE 2 TABLETS (40 MEQ) BY MOUTH EVERY MORNING AND 1 TABLET (20 MEQ) EVERY EVENING. 270 tablet 3     lidocaine (LIDODERM) 5 % patch APPLY PATCH TO PAINFUL AREA FOR UP TO 12 H WITHIN A 24 H PERIOD. REMOVE AFTER 12 HOURS. (Patient taking differently: Apply patch to painful area for up to 12 h within a 24 h period.  Remove after 12 hours.) 30 patch 2     loratadine (CLARITIN) 10 MG tablet TAKE 1 TABLET BY MOUTH EVERY DAY 90 tablet 3     methocarbamol (ROBAXIN) 750 MG tablet Take 1 tablet (750 mg) by mouth 3 times daily as needed for muscle spasms 90 tablet 3     metoprolol succinate ER (TOPROL-XL) 50 MG 24 hr tablet Take 1 tablet (50 mg) by mouth 2 times daily 90 tablet 3     montelukast (SINGULAIR) 10 MG tablet TAKE 1 TABLET BY MOUTH EVERYDAY AT BEDTIME 90 tablet 0     NOVOLOG FLEXPEN 100 UNIT/ML soln INJECT 12 UNITS SUBCUTANEOUS 3 TIMES DAILY (WITH MEALS) (Patient taking differently: 11 Units) 15 mL 1     oxyCODONE-acetaminophen (PERCOCET) 5-325 MG tablet Take 1 tablet by mouth 2 times daily as needed for pain May fill and start on 9/21/2022. For chronic pain. 20 tablet 0     OZEMPIC, 1 MG/DOSE, 4 MG/3ML SOPN INJECT 1 MG SUBCUTANEOUS ONCE A WEEK 3 mL 2     pantoprazole (PROTONIX) 40 MG EC tablet TAKE 1 TABLET (40 MG) BY MOUTH DAILY (REPLACES FAMOTIDINE- SHOULD STOP TAKING FAMOTIDINE) 90 tablet 0     predniSONE (DELTASONE) 5 MG tablet Take 1 tablet (5 mg) by mouth daily 90 tablet 1      rivaroxaban ANTICOAGULANT (XARELTO ANTICOAGULANT) 20 MG TABS tablet TAKE 1 TABLET BY MOUTH DAILY WITH DINNER 90 tablet 3     spironolactone (ALDACTONE) 25 MG tablet Take 2 tablets (50 mg) by mouth daily 180 tablet 3     torsemide (DEMADEX) 10 MG tablet TAKE ONE TAB (10 MG) WITH ONE 20 MG TAB TO = 30 MG DAILY IN AFTERNOON. 90 tablet 3     torsemide (DEMADEX) 20 MG tablet TAKE 2 TABLETS (40 MG) BY MOUTH EVERY MORNING AND 1 TABLET (20 MG) DAILY AT 2 PM. TAKE THE AFTERNOON DOSE WITH AN EXTRA 10 MG TAB FOR A TOTAL OF 30 MG IN THE AFTERNOON 270 tablet 3     traZODone (DESYREL) 50 MG tablet Take 3 tablets (150 mg) by mouth nightly as needed for sleep 135 tablet 1        ALLERGIES:     Allergies   Allergen Reactions     Amoxicillin-Pot Clavulanate      Augmentin Nausea and Vomiting     Codeine Nausea and Vomiting     PN: LW Reaction: HIVES     Penicillins Nausea and Vomiting     PN: LW Reaction: GI Upset     Phenobarbital Itching     Seasonal Allergies         FAMILY HISTORY:     Family History   Problem Relation Age of Onset     C.A.D. Mother 63        MI- first at age 63     Heart Disease Mother      Hypertension Mother      Cerebrovascular Disease Mother      Hyperlipidemia Mother      Coronary Artery Disease Mother         MI-first at age 63     Other - See Comments Mother         cerebrovascular disease      Alcohol/Drug Father      Alzheimer Disease Father      Dementia Father      Hypertension Father      Hyperlipidemia Father      Substance Abuse Father      Diabetes Sister      Hypertension Sister      Hyperlipidemia Sister      Substance Abuse Sister      Asthma Sister      C.A.D. Sister 52        Minor MI- age 50's     Heart Disease Sister      Hypertension Sister      Hypertension Sister      Cancer - colorectal Sister 48        Late 40's early 50's     Gastrointestinal Disease Sister         Diverticulitis     Lipids Sister      Lipids Sister      Diabetes Sister      Heart Disease Sister         CHF     Cancer  Sister         lung, smoker     Substance Abuse Sister      Asthma Sister      Cancer Sister      Coronary Artery Disease Sister         minor MI-age 50's     Heart Disease Sister      Hypertension Sister      Hypertension Sister      Colon Cancer Sister      Diverticulitis Sister      Lung Cancer Sister      Heart Disease Sister         CHF     Diabetes Sister      Asthma Sister      Hypertension Brother      Prostate Cancer Brother 74        Dx'd age 74     Gastrointestinal Disease Brother         Diverticulitis     Parkinsonism Brother      Substance Abuse Brother      Prostate Cancer Brother      Hyperlipidemia Brother      Hypertension Brother      Prostate Cancer Brother      Diverticulitis Brother      Parkinsonism Brother      Breast Cancer Daughter      Breast Cancer Daughter      Diabetes Other      Hypertension Other      Anesthesia Reaction No family hx of      Deep Vein Thrombosis (DVT) No family hx of         SOCIAL HISTORY:     Social History     Socioeconomic History     Marital status: Single     Number of children: 0     Years of education: Ed Spec De   Occupational History     Occupation: Professor     Employer: [employer]     Comment: [employer]- Education   Tobacco Use     Smoking status: Former Smoker     Packs/day: 0.50     Years: 10.00     Pack years: 5.00     Types: Cigarettes     Quit date: 2011     Years since quittin.1     Smokeless tobacco: Never Used     Tobacco comment:  ppd   Substance and Sexual Activity     Alcohol use: No     Alcohol/week: 0.0 standard drinks     Comment: In recovery beginning      Drug use: No     Sexual activity: Never   Other Topics Concern     Parent/sibling w/ CABG, MI or angioplasty before 65F 55M? Yes   Social History Narrative                    REVIEW OF SYSTEMS:     Constitutional: No fevers or chills  Skin: No new rash or itching  Eyes: No acute change in vision  Ears/Nose/Throat: No purulent  rhinorrhea, new hearing loss, or new vertigo  Respiratory: No cough or hemoptysis  Cardiovascular: See HPI  Gastrointestinal: No change in appetite, vomiting, hematemesis or diarrhea  Genitourinary: No dysuria or hematuria  Musculoskeletal: No new back pain, neck pain or muscle pain  Neurologic: No new headaches, focal weakness or behavior changes  Psychiatric: No hallucinations, excessive alcohol consumption or illegal drug usage  Hematologic/Lymphatic/Immunologic: No bleeding, chills, fever, night sweats or weight loss  Endocrine: No new cold intolerance, heat intolerance, polyphagia, polydipsia or polyuria      PHYSICAL EXAMINATION:     /70 (BP Location: Right arm, Patient Position: Chair, Cuff Size: Adult Regular)   Pulse 55   Wt 86.2 kg (190 lb)   SpO2 96%   BMI 29.76 kg/m      GENERAL: No acute distress.  HEENT: EOMI. Sclerae white, not injected. Nares clear. Pharynx without erythema or exudate.   Neck: No adenopathy. No thyromegaly. No jugular venous distension.   Heart: Regular rate and rhythm. No murmur.   Lungs: Clear to auscultation. No ronchi, wheezes, rales.   Abdomen: Soft, nontender, nondistended. Bowel sounds present.  Extremities: No clubbing, cyanosis, or edema.   Neurologic: Alert and oriented to person/place/time, normal speech and affect. No focal deficits.  Skin: No petechiae, purpura or rash.     LABORATORY DATA:     LIPID RESULTS:  Lab Results   Component Value Date    CHOL 104 12/09/2021    CHOL 115 10/20/2020    HDL 59 12/09/2021    HDL 71 10/20/2020    LDL 16 12/09/2021    LDL 19 10/20/2020    TRIG 145 12/09/2021    TRIG 125 10/20/2020    CHOLHDLRATIO 2.5 08/06/2014       LIVER ENZYME RESULTS:  Lab Results   Component Value Date    AST 13 08/23/2022    AST 17 06/04/2021    ALT 14 08/23/2022    ALT 25 06/04/2021       CBC RESULTS:  Lab Results   Component Value Date    WBC 6.6 09/23/2022    WBC 8.6 06/04/2021    RBC 3.96 09/23/2022    RBC 4.46 06/04/2021    HGB 12.0 09/23/2022     HGB 13.4 06/04/2021    HCT 37.5 09/23/2022    HCT 42.1 06/04/2021    MCV 95 09/23/2022    MCV 94 06/04/2021    MCH 30.3 09/23/2022    MCH 30.0 06/04/2021    MCHC 32.0 09/23/2022    MCHC 31.8 06/04/2021    RDW 14.3 09/23/2022    RDW 15.5 (H) 06/04/2021     09/23/2022     06/04/2021       BMP RESULTS:  Lab Results   Component Value Date     09/23/2022     06/04/2021    POTASSIUM 3.5 09/23/2022    POTASSIUM 4.1 08/23/2022    POTASSIUM 4.0 06/04/2021    CHLORIDE 102 09/23/2022    CHLORIDE 107 08/23/2022    CHLORIDE 106 06/04/2021    CO2 25 09/23/2022    CO2 22 08/23/2022    CO2 25 06/04/2021    ANIONGAP 14 09/23/2022    ANIONGAP 8 08/23/2022    ANIONGAP 6 06/04/2021     (H) 09/26/2022     (H) 08/23/2022     (H) 06/04/2021    BUN 15.0 09/23/2022    BUN 17 08/23/2022    BUN 14 06/04/2021    CR 1.17 (H) 09/23/2022    CR 1.11 (H) 06/04/2021    GFRESTIMATED 46 (L) 09/23/2022    GFRESTIMATED 47 (L) 06/04/2021    GFRESTBLACK 54 (L) 06/04/2021    CLAUDY 9.6 09/23/2022    CLAUDY 8.8 06/04/2021        A1C RESULTS:  Lab Results   Component Value Date    A1C 6.4 (H) 08/23/2022    A1C 7.1 (H) 06/04/2021       INR RESULTS:  Lab Results   Component Value Date    INR 1.37 (H) 09/23/2022    INR 1.36 (H) 03/03/2020    INR 2.37 (H) 02/14/2019          PROCEDURES & FURTHER ASSESSMENTS:     Reviewed post procedure studies including TTE       CLINICAL IMPRESSION:     Betty Tee is a very pleasant 82 year old female who presents for 1 week Watchman follow-up.    1. Paroxysmal a-fib with intolerance to AC  2. 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 Post-procedure VERONICA showed no evidence of benigno-device leak and no pericardial effusion. Doing well post procedure, groin site is healing well, no bleeding concerns.   - Recommend anticoagulation with Xarelto 15 mg daily x 45 days  - ASA 81 mg daily lifelong   - Follow-up in 45 days with cardiac CT  to evaluate endothelialization of the device. If adequate seal is assessed (<5mm), Xarelto will be discontinued and dual antiplatelet therapy with clopidogrel and aspirin will be initiated to continue for total of 6 months from implantation date, with aspirin then continuing lifelong.     3. Chronic diastolic heart failure:  4. HTN  5. HLD  6. ANGELICA  Currently appears euvolemic.  - Continue torsemide 40 mg a.m./30 mg p.m and spironolactone 50 mg daily.  - Continue atorvastatin for cholesterol management  - Continue CPAP     7. Type II DM:   - Insulin dependent with novolog and lantus. Also on Ozempic. Continue current regimen.      8. Severe right hip pain 2/2 OA: Continue current pain regimen per Dr. Alba. Follow-up with orthopedist Dr. Shannon regarding timing of right hip replacement. Once she is 45 days post watchman (11/10/22) she will be safe to hold AC and continue ASA perioperatively.      9. PMR: Continue daily prednisone   10. Sjogrens: Continue Cevimeline for dry mouth  11. Gout: PRN anakinra for gout     RTC: 45 days post watchman with CT and labs prior       LIZY Gonzalez, CNP  [facility]       CC  Patient Care Team:  Ilene Tristan MD as PCP - General  Ilene Tristan MD as Assigned PCP  Kirill Polk MD as MD (Otolaryngology)  Aubrey Hurtado MD as MD (Cardiology)  Anderson Perez MD as MD (Clinical Cardiac Electrophysiology)  Radha Rosa MD as MD (Cardiology)  Kiesha Elias APRN CNP as Nurse Practitioner (Cardiology)  Beatriz Blanco, RN as Nurse Coordinator (Cardiology)  Carmenza Stringer MD as MD (Rheumatology)  Danay Payne, JASON as Specialty Care Coordinator (Cardiology)  Sabrina Meek [facility] as Pharmacist (Pharmacist)  Flor Velasquez, RN as Specialty Care Coordinator (Cardiology)  Zulma Lopez MD as MD (Rheumatology)  Kami Lang MD as MD (Ophthalmology)  Reina Betancur MD as MD  (Internal Medicine)  Zulma Lopez MD as Referring Physician (Rheumatology)  Zulma Lopez MD as Assigned Rheumatology Provider  Maryjane Tillman MD as Assigned Pulmonology Provider  Kelechi Santana MD as Assigned Surgical Provider  Radha Rosa MD as Assigned Heart and Vascular Provider  Thomas Shannon MD as Assigned Musculoskeletal Provider  Sabrina Meek [facility] as Assigned MTM Pharmacist

## 2022-10-05 ENCOUNTER — OFFICE VISIT (OUTPATIENT)
Dept: CARDIOLOGY | Facility: CLINIC | Age: 82
End: 2022-10-05
Attending: INTERNAL MEDICINE
Payer: MEDICARE

## 2022-10-05 ENCOUNTER — LAB (OUTPATIENT)
Dept: LAB | Facility: CLINIC | Age: 82
End: 2022-10-05
Payer: MEDICARE

## 2022-10-05 VITALS
WEIGHT: 190.7 LBS | HEART RATE: 54 BPM | OXYGEN SATURATION: 100 % | BODY MASS INDEX: 29.87 KG/M2 | DIASTOLIC BLOOD PRESSURE: 65 MMHG | SYSTOLIC BLOOD PRESSURE: 122 MMHG

## 2022-10-05 DIAGNOSIS — I50.32 CHRONIC HEART FAILURE WITH PRESERVED EJECTION FRACTION (H): ICD-10-CM

## 2022-10-05 DIAGNOSIS — M16.11 OSTEOARTHRITIS OF RIGHT HIP, UNSPECIFIED OSTEOARTHRITIS TYPE: Primary | ICD-10-CM

## 2022-10-05 LAB
ANION GAP SERPL CALCULATED.3IONS-SCNC: 11 MMOL/L (ref 7–15)
BUN SERPL-MCNC: 19.9 MG/DL (ref 8–23)
CALCIUM SERPL-MCNC: 9.2 MG/DL (ref 8.8–10.2)
CHLORIDE SERPL-SCNC: 103 MMOL/L (ref 98–107)
CREAT SERPL-MCNC: 1.06 MG/DL (ref 0.51–0.95)
DEPRECATED HCO3 PLAS-SCNC: 24 MMOL/L (ref 22–29)
GFR SERPL CREATININE-BSD FRML MDRD: 52 ML/MIN/1.73M2
GLUCOSE SERPL-MCNC: 193 MG/DL (ref 70–99)
NT-PROBNP SERPL-MCNC: 746 PG/ML (ref 0–450)
POTASSIUM SERPL-SCNC: 3.6 MMOL/L (ref 3.4–5.3)
SODIUM SERPL-SCNC: 138 MMOL/L (ref 136–145)

## 2022-10-05 PROCEDURE — 83880 ASSAY OF NATRIURETIC PEPTIDE: CPT | Performed by: PATHOLOGY

## 2022-10-05 PROCEDURE — 80048 BASIC METABOLIC PNL TOTAL CA: CPT | Performed by: PATHOLOGY

## 2022-10-05 PROCEDURE — 99214 OFFICE O/P EST MOD 30 MIN: CPT | Performed by: INTERNAL MEDICINE

## 2022-10-05 PROCEDURE — 36415 COLL VENOUS BLD VENIPUNCTURE: CPT | Performed by: PATHOLOGY

## 2022-10-05 PROCEDURE — G0463 HOSPITAL OUTPT CLINIC VISIT: HCPCS

## 2022-10-05 ASSESSMENT — PAIN SCALES - GENERAL: PAINLEVEL: NO PAIN (0)

## 2022-10-05 NOTE — TELEPHONE ENCOUNTER
[facility] Call Center    Phone Message    May a detailed message be left on voicemail: yes     Reason for Call: Other: Patient is calling since she has not heard back yet from a nurse or the care team. Please call to discuss.      Action Taken: Other: Pain    Travel Screening: Not Applicable

## 2022-10-05 NOTE — NURSING NOTE
Chief Complaint   Patient presents with     Follow Up     return HF, HFpEF labs prior          Vitals were taken and medications reconciled.     Matt Arroyo, EMT   12:50 PM

## 2022-10-05 NOTE — TELEPHONE ENCOUNTER
Routing to provider to review and advise regarding rotation back to Norco or other pain management options    Called and LVM to discuss with Nghia and Sister Betty as to how she is taking Percocet and how pain is comparerd to when on Norco    Last OV 9/21/22: Discussed an opioid rotation to see if she gets better pain relief. She has been noting some benefit with norco but not significant. Will stop norco. Start Percocet 5-325 mg BID prn. 10 day script given. She will my chart to let me know if this is more helpful. If so will provide a 1 month prescription. If not helpful then will resume Norco 7.5-325 mg BID prn.     oxyCODONE-acetaminophen (PERCOCET) 5-325 MG tablet 20 tablet 0 9/21/2022  Tyra SHERIDAN RN Care Coordinator  [facility]

## 2022-10-05 NOTE — PROGRESS NOTES
October 5, 2022    Follow up HFpEF    HPI:   Patient has interstitial lung disease (moderate restriction), Sjogren's syndrome, HTN, atrial fibrillation, diverticulitis s/p colectomy 2011 who I am following for HFpEF. She is here for routine HFpEF follow up.     She has only occasional lightheadedness, no syncope, no chest pain, no PND, no orthopnea, or lower extremity edema.      She does wear her CPAP mask routinely.     Exercise tolerance is mostly limited by hip and back pain. She does have fatigue and KIMBLE with 1-2 blocks of walking.     The last year has been somewhat complex for her.  She has had recurrent GI bleeding    She is also had paroxysmal atrial fibrillation she had a watchman placed several days ago    Her biggest complaint is right hip pain and she is seeing the pain clinic for this but it is limited her mobility and she has lost some strength    She denies PND orthopnea or lower extrema edema chest heaviness or dizziness.     Her stools are black from her iron although she has not had any large-volume black stools.    Her watchman procedure was uncomplicated.       PAST MEDICAL HISTORY:  Past Medical History:   Diagnosis Date     Alcohol abuse, in remission      Allergic rhinitis, cause unspecified     allegra helps when she takes it     Antiplatelet or antithrombotic long-term use      Atrial fibrillation (H)     in hosp in 11/11 after surgery w/ fluid overload     Cardiomegaly     LVH on stress echo- cardiac w/u at [facility] ER- neg CT scan for PE, neg stress echo in 8/06     Chest pain, unspecified      Congestive heart failure (H)      Depressive disorder      Diabetes (H)      Disorder of bone and cartilage, unspecified     osteopenia (had been on prempro), improved on 6/06 dexa, stable dexa 11/10     Diverticulosis of colon (without mention of hemorrhage)     last episode yrs ago     Essential hypertension, benign      Follicular bronchiolitis (H)     associated with Sjogrens, dx by chest CT  showing mosaic attenuation and air trapping     Gastro-oesophageal reflux disease      ILD (interstitial lung disease) (H)     associated with Sjogrens, also has mildly elevated IgG4, first noted on chest CT 2015 (mild changes) and also has small airways disease; ILD improved on follow up chest CT 2018.     Insomnia, unspecified     weaned off clonazepam     Irregular heart beat      Lumbago 7/09    MRI with NEAL, now seeing Dr. Cain for sciatic sx's     Major depressive disorder, recurrent episode, moderate (H)      Obstructive sleep apnea     uses cpap     Osteoarthrosis, unspecified whether generalized or localized, unspecified site      Sjogren's syndrome (H)     + RG and SSA and lip bx     Sleep apnea      Tobacco use disorder     chantix in 9/07, started again in 6/08, working     FAMILY HISTORY:  Mother had MI and CHF in her 60's. 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      CURRENT MEDICATIONS:  Current Outpatient Medications   Medication Sig Dispense Refill     ACCU-CHEK SHANNON PLUS test strip USE TO TEST BLOOD SUGARS 3 TIMES DAILY 300 strip 5     acetaminophen (TYLENOL) 500 MG tablet Take 1,000 mg by mouth every 8 hours as needed (max 6 tablets/24 hours, 2 tablets/dose)       acyclovir (ZOVIRAX) 400 MG tablet Take 1 tablet (400 mg) by mouth 3 times daily as needed For a couple days 15 tablet 2     acyclovir (ZOVIRAX) 5 % external ointment Apply topically as needed (6 times day PRN for outbreaks) As needed for outbreaks 15 g 3     albuterol (PROAIR HFA/PROVENTIL HFA/VENTOLIN HFA) 108 (90 Base) MCG/ACT inhaler Inhale 2 puffs into the lungs every 6 hours 18 g 11     alendronate (FOSAMAX) 70 MG tablet Take 1 tablet (70 mg) by mouth every 7 days 12 tablet 0     anakinra (KINERET) 100 MG/0.67ML SOSY injection 100 mg every day x 3 days as needed for flare ups 6.7 mL 3     aspirin (ASA) 81 MG EC tablet Take 1 tablet (81 mg) by  mouth daily 90 tablet 1     atorvastatin (LIPITOR) 10 MG tablet TAKE 1/2 TABLET BY MOUTH EVERY DAY 45 tablet 2     azithromycin (ZITHROMAX) 250 MG tablet TAKE 1 TABLET BY MOUTH EVERY DAY 30 tablet 5     BD PEN NEEDLE JANE 2ND GEN 32G X 4 MM miscellaneous USE 4 DAILY AS DIRECTED. 400 each 1     blood glucose (NO BRAND SPECIFIED) lancets standard Use to test blood sugar 3 times daily or as directed 100 lancet 3     cevimeline (EVOXAC) 30 MG capsule Take 1 capsule (30 mg) by mouth 3 times daily (Patient taking differently: Take 30 mg by mouth twice a week On Tuesdays and Fridays) 270 capsule 2     Cholecalciferol (VITAMIN D3) 50 MCG (2000 UT) CAPS TAKE 100 MCG BY MOUTH DAILY (TAKE 2 TABLET (50 MCG) BY MOUTH DAILY - ORAL) 90 capsule 1     cyanocobalamin (VITAMIN B-12) 1000 MCG tablet Take 1 tablet (1,000 mcg) by mouth daily 30 tablet 1     escitalopram (LEXAPRO) 20 MG tablet Take 1 tablet (20 mg) by mouth daily 90 tablet 0     ferrous sulfate (FEROSUL) 325 (65 Fe) MG tablet TAKE 1 TABLET BY MOUTH EVERY DAY WITH BREAKFAST 90 tablet 0     fluticasone (FLONASE) 50 MCG/ACT nasal spray SPRAY 1-2 SPRAYS INTO BOTH NOSTRILS DAILY AS NEEDED FOR ALLERGIES 16 mL 11     fluticasone-vilanterol (BREO ELLIPTA) 100-25 MCG/INH inhaler Inhale 1 puff into the lungs daily 1 each 11     hydroxychloroquine (PLAQUENIL) 200 MG tablet Take 2 tablets (400 mg) by mouth daily Get annual eye exams for hydroxychloroquine (Plaquenil) monitoring and fax to [phone] 180 tablet 3     insulin glargine (BASAGLAR KWIKPEN) 100 UNIT/ML pen INJECT 22 UNITS SUBCUTANEOUS DAILY (TO REPLACE LANTUS) (Patient taking differently: INJECT 24 UNITS SUBCUTANEOUS DAILY (TO REPLACE LANTUS)) 15 mL 1     ketoconazole (NIZORAL) 2 % external cream Apply topically 2 times daily as needed for itching 60 g 1     KLOR-CON 20 MEQ CR tablet TAKE 2 TABLETS (40 MEQ) BY MOUTH EVERY MORNING AND 1 TABLET (20 MEQ) EVERY EVENING. 270 tablet 3     lidocaine (LIDODERM) 5 % patch APPLY  PATCH TO PAINFUL AREA FOR UP TO 12 H WITHIN A 24 H PERIOD. REMOVE AFTER 12 HOURS. (Patient taking differently: Apply patch to painful area for up to 12 h within a 24 h period.  Remove after 12 hours.) 30 patch 2     loratadine (CLARITIN) 10 MG tablet TAKE 1 TABLET BY MOUTH EVERY DAY 90 tablet 3     methocarbamol (ROBAXIN) 750 MG tablet Take 1 tablet (750 mg) by mouth 3 times daily as needed for muscle spasms 90 tablet 3     metoprolol succinate ER (TOPROL-XL) 50 MG 24 hr tablet Take 1 tablet (50 mg) by mouth 2 times daily 90 tablet 3     montelukast (SINGULAIR) 10 MG tablet TAKE 1 TABLET BY MOUTH EVERYDAY AT BEDTIME 90 tablet 0     NOVOLOG FLEXPEN 100 UNIT/ML soln INJECT 12 UNITS SUBCUTANEOUS 3 TIMES DAILY (WITH MEALS) (Patient taking differently: 11 Units) 15 mL 1     oxyCODONE-acetaminophen (PERCOCET) 5-325 MG tablet Take 1 tablet by mouth 2 times daily as needed for pain May fill and start on 9/21/2022. For chronic pain. 20 tablet 0     OZEMPIC, 1 MG/DOSE, 4 MG/3ML SOPN INJECT 1 MG SUBCUTANEOUS ONCE A WEEK 3 mL 2     pantoprazole (PROTONIX) 40 MG EC tablet TAKE 1 TABLET (40 MG) BY MOUTH DAILY (REPLACES FAMOTIDINE- SHOULD STOP TAKING FAMOTIDINE) 90 tablet 0     predniSONE (DELTASONE) 5 MG tablet Take 1 tablet (5 mg) by mouth daily 90 tablet 1     rivaroxaban ANTICOAGULANT (XARELTO ANTICOAGULANT) 20 MG TABS tablet TAKE 1 TABLET BY MOUTH DAILY WITH DINNER 90 tablet 3     spironolactone (ALDACTONE) 25 MG tablet Take 2 tablets (50 mg) by mouth daily 180 tablet 3     torsemide (DEMADEX) 10 MG tablet TAKE ONE TAB (10 MG) WITH ONE 20 MG TAB TO = 30 MG DAILY IN AFTERNOON. 90 tablet 3     torsemide (DEMADEX) 20 MG tablet TAKE 2 TABLETS (40 MG) BY MOUTH EVERY MORNING AND 1 TABLET (20 MG) DAILY AT 2 PM. TAKE THE AFTERNOON DOSE WITH AN EXTRA 10 MG TAB FOR A TOTAL OF 30 MG IN THE AFTERNOON 270 tablet 3     traZODone (DESYREL) 50 MG tablet Take 3 tablets (150 mg) by mouth nightly as needed for sleep 135 tablet 1     COMPOUNDED  NON-CONTROLLED SUBSTANCE (CMPD RX) - PHARMACY TO MIX COMPOUNDED MEDICATION Estriol 1 mg/g Apply small amount to finger and apply to inside vagina daily for 2 weeks then twice weekly Route: vaginally Dispense 30 grams 11 refills (Patient not taking: Reported on 10/5/2022) 30 g 11     ROS:   Constitutional: No fever, chills.  Generally losing weight over the last year and strength  ENT: eye hematoma ear ache, epistaxis, sore throat.   Allergies/Immunologic: Negative.   Respiratory: + intermittent cough, no hemoptysis.   Cardiovascular: As per HPI.   GI: No nausea, vomiting,stools are chronically black from Fe   : No urinary frequency, dysuria, or hematuria.   Integument: Negative.   Psychiatric: Frustrated about how much pain she is in  Neuro: Negative.   Endocrinology: Negative.   Musculoskeletal: Significant limiting right hip pain    EXAM:  /65 (BP Location: Right arm, Patient Position: Chair, Cuff Size: Adult Regular)   Pulse 54   Wt 86.5 kg (190 lb 11.2 oz)   SpO2 100%   BMI 29.87 kg/m    General: appears comfortable, alert and articulate  Head: normocephalic, atraumatic  Neck: no adenopathy  Orophyarynx: moist mucosa, no lesions, dentition intact  Heart: regular, S1/S2, no murmur, gallop, rub, estimated JVP <10   Lungs: diffuse expiratory wheezing, no crackles,no dullness   Abdomen: soft, non-tender, bowel sounds present, no hepatosplenomegaly  Extremities: trace LE edema, no clubbing, cyanosis.-Thin upper thighs  Neurological: normal speech and affect, no gross motor deficits  She is in a wheelchair today    Labs:     Creatinine 1.0 potassium 3.6 NT proBNP 746    Regadenosen MPI 2014: no fixed or inducible defects      Last EKG: Sinus bradycardia September 2022      Echo periprocedure     BASELINE SURVEY:  1. Normal left ventricular systolic function. LVEF 55 to 60%.  2. No intracardiac thrombus.  3. No pericardial effusion.  4. Maximal left atrial appendage orifice diameter 1.9 cm at45 degrees.      PROCEDURAL MONITORIN. Trans-septal puncture, guiding catheter placement and device delivery  performed under VERONICA guidance.  2. Stable device position without any significant device leak. Adequate  compression documented.     POST-PROCEDURAL SURVEY:  1. Left ventricular function is unchanged.  2. No pericardial effusion.          Assessment and Plan:  In summary this is a very pleasant 82 F with HFpEF who is here for follow up.     In support of the diagnosis of HFpEF includes mild LA enlargement, echocardiographic signs of increase LV filling pressures, increased nt-bnp, as well as a diuretic requirement and symptomatic improvement on diuretics.    The risk factors for HFpEF in her include age, gender, HTN, diabetes, sleep apnea and obesity.  A prior stress test was negative for CAD. She is presently euvolemic on exam and is NYHA class II(mutifactorial).    The mainstays of therapy for HFpEF include volume management, blood pressure control, treating underlying sleep apnea if present, weight management, exercise training, rate control for atrial fibrillation as well as consideration for a rhythm control strategy, as well as consideration for clinical trials.  I do have her on spironolactone given the results of the TOPCAT trial.  Guidelines also support the use of SGLT2 I and have recently added a recommendation around Entresto.     For now she is euvolemic, her blood pressure is controlled and given the complexity of her last new months I am not adding/adjjuusting HFpEF meds right now     Next move will be to lower diuretic and add SGL2i     Atrial fibrillation [id number] (age >75, HTN, CHF, gender) -paroxysmal-   S/p watchman    Will have follow up VERONICA per interventional   On asprin 81 mg daily and NOAC   Will evenutally go to asprin plavix and then to asprin alone - currently Hgb holding       Primary prevention: on statin, on ASA        Follow up in 6 months with me consider SGL21i at that time      Echo in 1 year       Radha Rosa MD  Associate Professor of Medicine   [facility] Division of Cardiology

## 2022-10-05 NOTE — LETTER
10/5/2022      RE: Betty Tee  [address]  [facility] [place] [address]       Dear Colleague,    Thank you for the opportunity to participate in the care of your patient, Betty Tee, at the [facility] [place] [facility]. Please see a copy of my visit note below.    October 5, 2022    Follow up HFpEF    HPI:   Patient has interstitial lung disease (moderate restriction), Sjogren's syndrome, HTN, atrial fibrillation, diverticulitis s/p colectomy 2011 who I am following for HFpEF. She is here for routine HFpEF follow up.     She has only occasional lightheadedness, no syncope, no chest pain, no PND, no orthopnea, or lower extremity edema.      She does wear her CPAP mask routinely.     Exercise tolerance is mostly limited by hip and back pain. She does have fatigue and KIMBLE with 1-2 blocks of walking.     The last year has been somewhat complex for her.  She has had recurrent GI bleeding    She is also had paroxysmal atrial fibrillation she had a watchman placed several days ago    Her biggest complaint is right hip pain and she is seeing the pain clinic for this but it is limited her mobility and she has lost some strength    She denies PND orthopnea or lower extrema edema chest heaviness or dizziness.     Her stools are black from her iron although she has not had any large-volume black stools.    Her watchman procedure was uncomplicated.       PAST MEDICAL HISTORY:  Past Medical History:   Diagnosis Date     Alcohol abuse, in remission      Allergic rhinitis, cause unspecified     allegra helps when she takes it     Antiplatelet or antithrombotic long-term use      Atrial fibrillation (H)     in hosp in 11/11 after surgery w/ fluid overload     Cardiomegaly     LVH on stress echo- cardiac w/u at [facility] ER- neg CT scan for PE, neg stress echo in 8/06     Chest pain, unspecified      Congestive heart failure (H)      Depressive disorder       Diabetes (H)      Disorder of bone and cartilage, unspecified     osteopenia (had been on prempro), improved on 6/06 dexa, stable dexa 11/10     Diverticulosis of colon (without mention of hemorrhage)     last episode yrs ago     Essential hypertension, benign      Follicular bronchiolitis (H)     associated with Sjogrens, dx by chest CT showing mosaic attenuation and air trapping     Gastro-oesophageal reflux disease      ILD (interstitial lung disease) (H)     associated with Sjogrens, also has mildly elevated IgG4, first noted on chest CT 2015 (mild changes) and also has small airways disease; ILD improved on follow up chest CT 2018.     Insomnia, unspecified     weaned off clonazepam     Irregular heart beat      Lumbago 7/09    MRI with DJD, now seeing Dr. Cain for sciatic sx's     Major depressive disorder, recurrent episode, moderate (H)      Obstructive sleep apnea     uses cpap     Osteoarthrosis, unspecified whether generalized or localized, unspecified site      Sjogren's syndrome (H)     + RG and SSA and lip bx     Sleep apnea      Tobacco use disorder     chantix in 9/07, started again in 6/08, working     FAMILY HISTORY:  Mother had MI and CHF in her 60's. 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      CURRENT MEDICATIONS:  Current Outpatient Medications   Medication Sig Dispense Refill     ACCU-CHEK SHANNON PLUS test strip USE TO TEST BLOOD SUGARS 3 TIMES DAILY 300 strip 5     acetaminophen (TYLENOL) 500 MG tablet Take 1,000 mg by mouth every 8 hours as needed (max 6 tablets/24 hours, 2 tablets/dose)       acyclovir (ZOVIRAX) 400 MG tablet Take 1 tablet (400 mg) by mouth 3 times daily as needed For a couple days 15 tablet 2     acyclovir (ZOVIRAX) 5 % external ointment Apply topically as needed (6 times day PRN for outbreaks) As needed for outbreaks 15 g 3     albuterol (PROAIR HFA/PROVENTIL HFA/VENTOLIN HFA)  108 (90 Base) MCG/ACT inhaler Inhale 2 puffs into the lungs every 6 hours 18 g 11     alendronate (FOSAMAX) 70 MG tablet Take 1 tablet (70 mg) by mouth every 7 days 12 tablet 0     anakinra (KINERET) 100 MG/0.67ML SOSY injection 100 mg every day x 3 days as needed for flare ups 6.7 mL 3     aspirin (ASA) 81 MG EC tablet Take 1 tablet (81 mg) by mouth daily 90 tablet 1     atorvastatin (LIPITOR) 10 MG tablet TAKE 1/2 TABLET BY MOUTH EVERY DAY 45 tablet 2     azithromycin (ZITHROMAX) 250 MG tablet TAKE 1 TABLET BY MOUTH EVERY DAY 30 tablet 5     BD PEN NEEDLE JANE 2ND GEN 32G X 4 MM miscellaneous USE 4 DAILY AS DIRECTED. 400 each 1     blood glucose (NO BRAND SPECIFIED) lancets standard Use to test blood sugar 3 times daily or as directed 100 lancet 3     cevimeline (EVOXAC) 30 MG capsule Take 1 capsule (30 mg) by mouth 3 times daily (Patient taking differently: Take 30 mg by mouth twice a week On Tuesdays and Fridays) 270 capsule 2     Cholecalciferol (VITAMIN D3) 50 MCG (2000 UT) CAPS TAKE 100 MCG BY MOUTH DAILY (TAKE 2 TABLET (50 MCG) BY MOUTH DAILY - ORAL) 90 capsule 1     cyanocobalamin (VITAMIN B-12) 1000 MCG tablet Take 1 tablet (1,000 mcg) by mouth daily 30 tablet 1     escitalopram (LEXAPRO) 20 MG tablet Take 1 tablet (20 mg) by mouth daily 90 tablet 0     ferrous sulfate (FEROSUL) 325 (65 Fe) MG tablet TAKE 1 TABLET BY MOUTH EVERY DAY WITH BREAKFAST 90 tablet 0     fluticasone (FLONASE) 50 MCG/ACT nasal spray SPRAY 1-2 SPRAYS INTO BOTH NOSTRILS DAILY AS NEEDED FOR ALLERGIES 16 mL 11     fluticasone-vilanterol (BREO ELLIPTA) 100-25 MCG/INH inhaler Inhale 1 puff into the lungs daily 1 each 11     hydroxychloroquine (PLAQUENIL) 200 MG tablet Take 2 tablets (400 mg) by mouth daily Get annual eye exams for hydroxychloroquine (Plaquenil) monitoring and fax to [phone] 180 tablet 3     insulin glargine (BASAGLAR KWIKPEN) 100 UNIT/ML pen INJECT 22 UNITS SUBCUTANEOUS DAILY (TO REPLACE LANTUS) (Patient taking  differently: INJECT 24 UNITS SUBCUTANEOUS DAILY (TO REPLACE LANTUS)) 15 mL 1     ketoconazole (NIZORAL) 2 % external cream Apply topically 2 times daily as needed for itching 60 g 1     KLOR-CON 20 MEQ CR tablet TAKE 2 TABLETS (40 MEQ) BY MOUTH EVERY MORNING AND 1 TABLET (20 MEQ) EVERY EVENING. 270 tablet 3     lidocaine (LIDODERM) 5 % patch APPLY PATCH TO PAINFUL AREA FOR UP TO 12 H WITHIN A 24 H PERIOD. REMOVE AFTER 12 HOURS. (Patient taking differently: Apply patch to painful area for up to 12 h within a 24 h period.  Remove after 12 hours.) 30 patch 2     loratadine (CLARITIN) 10 MG tablet TAKE 1 TABLET BY MOUTH EVERY DAY 90 tablet 3     methocarbamol (ROBAXIN) 750 MG tablet Take 1 tablet (750 mg) by mouth 3 times daily as needed for muscle spasms 90 tablet 3     metoprolol succinate ER (TOPROL-XL) 50 MG 24 hr tablet Take 1 tablet (50 mg) by mouth 2 times daily 90 tablet 3     montelukast (SINGULAIR) 10 MG tablet TAKE 1 TABLET BY MOUTH EVERYDAY AT BEDTIME 90 tablet 0     NOVOLOG FLEXPEN 100 UNIT/ML soln INJECT 12 UNITS SUBCUTANEOUS 3 TIMES DAILY (WITH MEALS) (Patient taking differently: 11 Units) 15 mL 1     oxyCODONE-acetaminophen (PERCOCET) 5-325 MG tablet Take 1 tablet by mouth 2 times daily as needed for pain May fill and start on 9/21/2022. For chronic pain. 20 tablet 0     OZEMPIC, 1 MG/DOSE, 4 MG/3ML SOPN INJECT 1 MG SUBCUTANEOUS ONCE A WEEK 3 mL 2     pantoprazole (PROTONIX) 40 MG EC tablet TAKE 1 TABLET (40 MG) BY MOUTH DAILY (REPLACES FAMOTIDINE- SHOULD STOP TAKING FAMOTIDINE) 90 tablet 0     predniSONE (DELTASONE) 5 MG tablet Take 1 tablet (5 mg) by mouth daily 90 tablet 1     rivaroxaban ANTICOAGULANT (XARELTO ANTICOAGULANT) 20 MG TABS tablet TAKE 1 TABLET BY MOUTH DAILY WITH DINNER 90 tablet 3     spironolactone (ALDACTONE) 25 MG tablet Take 2 tablets (50 mg) by mouth daily 180 tablet 3     torsemide (DEMADEX) 10 MG tablet TAKE ONE TAB (10 MG) WITH ONE 20 MG TAB TO = 30 MG DAILY IN AFTERNOON. 90  tablet 3     torsemide (DEMADEX) 20 MG tablet TAKE 2 TABLETS (40 MG) BY MOUTH EVERY MORNING AND 1 TABLET (20 MG) DAILY AT 2 PM. TAKE THE AFTERNOON DOSE WITH AN EXTRA 10 MG TAB FOR A TOTAL OF 30 MG IN THE AFTERNOON 270 tablet 3     traZODone (DESYREL) 50 MG tablet Take 3 tablets (150 mg) by mouth nightly as needed for sleep 135 tablet 1     COMPOUNDED NON-CONTROLLED SUBSTANCE (CMPD RX) - PHARMACY TO MIX COMPOUNDED MEDICATION Estriol 1 mg/g Apply small amount to finger and apply to inside vagina daily for 2 weeks then twice weekly Route: vaginally Dispense 30 grams 11 refills (Patient not taking: Reported on 10/5/2022) 30 g 11     ROS:   Constitutional: No fever, chills.  Generally losing weight over the last year and strength  ENT: eye hematoma ear ache, epistaxis, sore throat.   Allergies/Immunologic: Negative.   Respiratory: + intermittent cough, no hemoptysis.   Cardiovascular: As per HPI.   GI: No nausea, vomiting,stools are chronically black from Fe   : No urinary frequency, dysuria, or hematuria.   Integument: Negative.   Psychiatric: Frustrated about how much pain she is in  Neuro: Negative.   Endocrinology: Negative.   Musculoskeletal: Significant limiting right hip pain    EXAM:  /65 (BP Location: Right arm, Patient Position: Chair, Cuff Size: Adult Regular)   Pulse 54   Wt 86.5 kg (190 lb 11.2 oz)   SpO2 100%   BMI 29.87 kg/m    General: appears comfortable, alert and articulate  Head: normocephalic, atraumatic  Neck: no adenopathy  Orophyarynx: moist mucosa, no lesions, dentition intact  Heart: regular, S1/S2, no murmur, gallop, rub, estimated JVP <10   Lungs: diffuse expiratory wheezing, no crackles,no dullness   Abdomen: soft, non-tender, bowel sounds present, no hepatosplenomegaly  Extremities: trace LE edema, no clubbing, cyanosis.-Thin upper thighs  Neurological: normal speech and affect, no gross motor deficits  She is in a wheelchair today    Labs:     Creatinine 1.0 potassium 3.6 NT  proBNP 746    Regadenosen MPI : no fixed or inducible defects      Last EKG: Sinus bradycardia 2022      Echo periprocedure     BASELINE SURVEY:  1. Normal left ventricular systolic function. LVEF 55 to 60%.  2. No intracardiac thrombus.  3. No pericardial effusion.  4. Maximal left atrial appendage orifice diameter 1.9 cm at45 degrees.     PROCEDURAL MONITORIN. Trans-septal puncture, guiding catheter placement and device delivery  performed under VERONICA guidance.  2. Stable device position without any significant device leak. Adequate  compression documented.     POST-PROCEDURAL SURVEY:  1. Left ventricular function is unchanged.  2. No pericardial effusion.          Assessment and Plan:  In summary this is a very pleasant 82 F with HFpEF who is here for follow up.     In support of the diagnosis of HFpEF includes mild LA enlargement, echocardiographic signs of increase LV filling pressures, increased nt-bnp, as well as a diuretic requirement and symptomatic improvement on diuretics.    The risk factors for HFpEF in her include age, gender, HTN, diabetes, sleep apnea and obesity.  A prior stress test was negative for CAD. She is presently euvolemic on exam and is NYHA class II(mutifactorial).    The mainstays of therapy for HFpEF include volume management, blood pressure control, treating underlying sleep apnea if present, weight management, exercise training, rate control for atrial fibrillation as well as consideration for a rhythm control strategy, as well as consideration for clinical trials.  I do have her on spironolactone given the results of the TOPCAT trial.  Guidelines also support the use of SGLT2 I and have recently added a recommendation around Entresto.     For now she is euvolemic, her blood pressure is controlled and given the complexity of her last new months I am not adding/adjjuusting HFpEF meds right now     Next move will be to lower diuretic and add SGL2i     Atrial  fibrillation [id number] (age >75, HTN, CHF, gender) -paroxysmal-   S/p watchman 9/22   Will have follow up VERONICA per interventional   On asprin 81 mg daily and NOAC   Will evenutally go to asprin plavix and then to asprin alone - currently Hgb holding       Primary prevention: on statin, on ASA        Follow up in 6 months with me consider SGL21i at that time     Echo in 1 year       Radha Rosa MD  Associate Professor of Medicine   [facility] Division of Cardiology

## 2022-10-05 NOTE — TELEPHONE ENCOUNTER
[facility] Call Center    Phone Message    May a detailed message be left on voicemail: yes     Reason for Call: Other: Christopher has been in a lot more pain and wanted something change or increased with her pain medications. Please call to discuss.      Action Taken: Other: Pain    Travel Screening: Not Applicable

## 2022-10-06 ENCOUNTER — OFFICE VISIT (OUTPATIENT)
Dept: CARDIOLOGY | Facility: CLINIC | Age: 82
End: 2022-10-06
Attending: NURSE PRACTITIONER
Payer: MEDICARE

## 2022-10-06 ENCOUNTER — OFFICE VISIT (OUTPATIENT)
Dept: GASTROENTEROLOGY | Facility: CLINIC | Age: 82
End: 2022-10-06
Payer: MEDICARE

## 2022-10-06 VITALS
OXYGEN SATURATION: 96 % | HEART RATE: 55 BPM | DIASTOLIC BLOOD PRESSURE: 70 MMHG | BODY MASS INDEX: 29.76 KG/M2 | SYSTOLIC BLOOD PRESSURE: 129 MMHG | WEIGHT: 190 LBS

## 2022-10-06 DIAGNOSIS — Z95.818 PRESENCE OF WATCHMAN LEFT ATRIAL APPENDAGE CLOSURE DEVICE: Primary | ICD-10-CM

## 2022-10-06 DIAGNOSIS — Z87.19 HISTORY OF GI BLEED: Primary | ICD-10-CM

## 2022-10-06 DIAGNOSIS — R94.39 ABNORMAL RESULT OF OTHER CARDIOVASCULAR FUNCTION STUDY: ICD-10-CM

## 2022-10-06 PROCEDURE — G0463 HOSPITAL OUTPT CLINIC VISIT: HCPCS

## 2022-10-06 PROCEDURE — 99214 OFFICE O/P EST MOD 30 MIN: CPT | Mod: 24 | Performed by: NURSE PRACTITIONER

## 2022-10-06 PROCEDURE — 99215 OFFICE O/P EST HI 40 MIN: CPT | Performed by: INTERNAL MEDICINE

## 2022-10-06 RX ORDER — OXYCODONE AND ACETAMINOPHEN 7.5; 325 MG/1; MG/1
1 TABLET ORAL 2 TIMES DAILY PRN
Qty: 30 TABLET | Refills: 0 | Status: SHIPPED | OUTPATIENT
Start: 2022-10-06 | End: 2022-10-20

## 2022-10-06 ASSESSMENT — PAIN SCALES - GENERAL: PAINLEVEL: NO PAIN (0)

## 2022-10-06 NOTE — TELEPHONE ENCOUNTER
LVM for pt to call and discuss new rx    Awaiting c/b    Ilene SHERIDAN RN Care Coordinator  [facility]

## 2022-10-06 NOTE — LETTER
10/6/2022      RE: Betty Tee  [address]  [facility] [address]       Dear Colleague,    Thank you for the opportunity to participate in the care of your patient, Betty Tee, at the [facility] [place] at [facility]. Please see a copy of my visit note below.        STRUCTURAL HEART CARE  CARDIOVASCULAR DIVISION    STRUCTURAL CLINIC RETURN VISIT    PRIMARY CARDIOLOGIST: Dr. Rosa      PERTINENT CLINICAL HISTORY:     Betty Tee is a very pleasant 82 year old female who presents for 1 week Watchman follow-up. 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 Right femoral venotomy closed with suture closure device. Her post procedure VERONICA showed well seated closure device without significant leak or evidence of pericardial effusion. She was discharged on Xarelto and ASA.     She reports feeling well since the procedure and has no specific cardiovascular concerns. She is tolerating anticoagulation without bleeding concerns. Groin site is healing well. Additionally she denies any chest pain, shortness of breath, orthopnea, PND, leg swelling, weight gain, palpitations, lightheadedness or syncope. Activity is mainly limited by severe right hip pain. She follows with the pain clinic. She has plans to undergo right hip replacement following her 45 days of anticoagulation.      PAST MEDICAL HISTORY:     Past Medical History:   Diagnosis Date     Alcohol abuse, in remission      Allergic rhinitis, cause unspecified     allegra helps when she takes it     Antiplatelet or antithrombotic long-term use      Atrial  fibrillation (H)     in hosp in 11/11 after surgery w/ fluid overload     Cardiomegaly     LVH on stress echo- cardiac w/u at [facility]- neg CT scan for PE, neg stress echo in 8/06     Chest pain, unspecified      Congestive heart failure (H)      Depressive disorder      Diabetes (H)      Disorder of bone and cartilage, unspecified     osteopenia (had been on prempro), improved on 6/06 dexa, stable dexa 11/10     Diverticulosis of colon (without mention of hemorrhage)     last episode yrs ago     Essential hypertension, benign      Follicular bronchiolitis (H)     associated with Sjogrens, dx by chest CT showing mosaic attenuation and air trapping     Gastro-oesophageal reflux disease      ILD (interstitial lung disease) (H)     associated with Sjogrens, also has mildly elevated IgG4, first noted on chest CT 2015 (mild changes) and also has small airways disease; ILD improved on follow up chest CT 2018.     Insomnia, unspecified     weaned off clonazepam     Irregular heart beat      Lumbago 7/09    MRI with DJD, now seeing Dr. Cain for sciatic sx's     Major depressive disorder, recurrent episode, moderate (H)      Obstructive sleep apnea     uses cpap     Osteoarthrosis, unspecified whether generalized or localized, unspecified site      Sjogren's syndrome (H)     + RG and SSA and lip bx     Sleep apnea      Tobacco use disorder     chantix in 9/07, started again in 6/08, working        PAST SURGICAL HISTORY:     Past Surgical History:   Procedure Laterality Date     APPENDECTOMY       BACK SURGERY  1962     BACK SURGERY  1962     BIOPSY BREAST  09/27/2002    Biopsy Left Breast     BIOPSY BREAST Left 09/27/2002     BREAST SURGERY       CARDIAC SURGERY       CARDIAC SURGERY       CHOLECYSTECTOMY  1990's?     CHOLECYSTECTOMY       COLECTOMY LEFT  11/07/2011    Procedure:COLECTOMY LEFT; Laparoscopic mobilization of splenic flexture, sigmoid colectomy, coloprotoscopy, loop illeostomy; Surgeon:CK CASTANEDA;  Location:[facility] OR     COLECTOMY LEFT  11/07/2011     COLONOSCOPY  1990,s     COLONOSCOPY N/A 5/3/2022    Procedure: COLONOSCOPY;  Surgeon: Guru Winsome Dias MD;  Location: [facility] GI     CV LEFT ATRIAL APPENDAGE CLOSURE N/A 9/26/2022    Procedure: Left Atrial Appendage Closure;  Surgeon: Uzair Crews MD;  Location:  HEART CARDIAC CATH LAB     ENT SURGERY       EYE SURGERY  2012     FLEXIBLE SIGMOIDOSCOPY  11/03/2011     HYSTERECTOMY TOTAL ABDOMINAL, BILATERAL SALPINGO-OOPHORECTOMY, COMBINED  11/07/2011    Procedure:COMBINED HYSTERECTOMY TOTAL ABDOMINAL, BILATERAL SALPINGO-OOPHORECTOMY; total abdominal hysterectomy, bilateral salpingo-oophorectomy; Surgeon:ALETA MANUEL; Location:[facility] OR     HYSTERECTOMY TOTAL ABDOMINAL, BILATERAL SALPINGO-OOPHORECTOMY, COMBINED  11/07/2011     ILEOSTOMY  02/01/2012    takedown loop ileostomy      INSERT STENT URETER  11/07/2011    Procedure:INSERT STENT URETER; Placement of Bilateral Ureteral Stents ; Surgeon:PRANEETH BRYANT; Location:[facility] OR     SIGMOIDECTOMY  02/01/2012    Dr. Castaneda, Sigmoidectomy for diverticular abscess, iliostomy afterwards until repair     SIGMOIDOSCOPY FLEXIBLE  11/03/2011    Procedure:SIGMOIDOSCOPY FLEXIBLE; Flexible Sigmoidoscopy; Surgeon:CK CASTANEDA; Location:[facility] OR     TAKEDOWN ILEOSTOMY  02/01/2012    Procedure:TAKEDOWN ILEOSTOMY; Takedown Loop Ileostomy ; Surgeon:CK CASTANEDA; Location:[facility] OR     URETERAL STENT PLACEMENT  11/07/2011     Z APPENDECTOMY  1970's?     ZC NONSPECIFIC PROCEDURE  11/2005    exploratory abd lap, adhesions, resolved RLQ pain, diverticulitis episodes        CURRENT MEDICATIONS:     Current Outpatient Medications   Medication Sig Dispense Refill     ACCU-CHEK SHANNON PLUS test strip USE TO TEST BLOOD SUGARS 3 TIMES DAILY 300 strip 5     acetaminophen (TYLENOL) 500 MG tablet Take 1,000 mg by mouth every 8 hours as needed (max 6 tablets/24 hours, 2 tablets/dose)       acyclovir (ZOVIRAX) 400 MG tablet  Take 1 tablet (400 mg) by mouth 3 times daily as needed For a couple days 15 tablet 2     acyclovir (ZOVIRAX) 5 % external ointment Apply topically as needed (6 times day PRN for outbreaks) As needed for outbreaks 15 g 3     albuterol (PROAIR HFA/PROVENTIL HFA/VENTOLIN HFA) 108 (90 Base) MCG/ACT inhaler Inhale 2 puffs into the lungs every 6 hours 18 g 11     alendronate (FOSAMAX) 70 MG tablet Take 1 tablet (70 mg) by mouth every 7 days 12 tablet 0     anakinra (KINERET) 100 MG/0.67ML SOSY injection 100 mg every day x 3 days as needed for flare ups 6.7 mL 3     aspirin (ASA) 81 MG EC tablet Take 1 tablet (81 mg) by mouth daily 90 tablet 1     atorvastatin (LIPITOR) 10 MG tablet TAKE 1/2 TABLET BY MOUTH EVERY DAY 45 tablet 2     azithromycin (ZITHROMAX) 250 MG tablet TAKE 1 TABLET BY MOUTH EVERY DAY 30 tablet 5     BD PEN NEEDLE JANE 2ND GEN 32G X 4 MM miscellaneous USE 4 DAILY AS DIRECTED. 400 each 1     blood glucose (NO BRAND SPECIFIED) lancets standard Use to test blood sugar 3 times daily or as directed 100 lancet 3     cevimeline (EVOXAC) 30 MG capsule Take 1 capsule (30 mg) by mouth 3 times daily (Patient taking differently: Take 30 mg by mouth twice a week On Tuesdays and Fridays) 270 capsule 2     Cholecalciferol (VITAMIN D3) 50 MCG (2000 UT) CAPS TAKE 100 MCG BY MOUTH DAILY (TAKE 2 TABLET (50 MCG) BY MOUTH DAILY - ORAL) 90 capsule 1     COMPOUNDED NON-CONTROLLED SUBSTANCE (CMPD RX) - PHARMACY TO MIX COMPOUNDED MEDICATION Estriol 1 mg/g Apply small amount to finger and apply to inside vagina daily for 2 weeks then twice weekly Route: vaginally Dispense 30 grams 11 refills (Patient taking differently: Estriol 1 mg/g Apply small amount to finger and apply to inside vagina twice weekly Route: vaginally Dispense 30 grams 11 refills) 30 g 11     cyanocobalamin (VITAMIN B-12) 1000 MCG tablet Take 1 tablet (1,000 mcg) by mouth daily 30 tablet 1     escitalopram (LEXAPRO) 20 MG tablet Take 1 tablet (20 mg) by mouth  daily 90 tablet 0     ferrous sulfate (FEROSUL) 325 (65 Fe) MG tablet TAKE 1 TABLET BY MOUTH EVERY DAY WITH BREAKFAST 90 tablet 0     fluticasone (FLONASE) 50 MCG/ACT nasal spray SPRAY 1-2 SPRAYS INTO BOTH NOSTRILS DAILY AS NEEDED FOR ALLERGIES 16 mL 11     fluticasone-vilanterol (BREO ELLIPTA) 100-25 MCG/INH inhaler Inhale 1 puff into the lungs daily 1 each 11     hydroxychloroquine (PLAQUENIL) 200 MG tablet Take 2 tablets (400 mg) by mouth daily Get annual eye exams for hydroxychloroquine (Plaquenil) monitoring and fax to [phone] 180 tablet 3     insulin glargine (BASAGLAR KWIKPEN) 100 UNIT/ML pen INJECT 22 UNITS SUBCUTANEOUS DAILY (TO REPLACE LANTUS) (Patient taking differently: INJECT 24 UNITS SUBCUTANEOUS DAILY (TO REPLACE LANTUS)) 15 mL 1     ketoconazole (NIZORAL) 2 % external cream Apply topically 2 times daily as needed for itching 60 g 1     KLOR-CON 20 MEQ CR tablet TAKE 2 TABLETS (40 MEQ) BY MOUTH EVERY MORNING AND 1 TABLET (20 MEQ) EVERY EVENING. 270 tablet 3     lidocaine (LIDODERM) 5 % patch APPLY PATCH TO PAINFUL AREA FOR UP TO 12 H WITHIN A 24 H PERIOD. REMOVE AFTER 12 HOURS. (Patient taking differently: Apply patch to painful area for up to 12 h within a 24 h period.  Remove after 12 hours.) 30 patch 2     loratadine (CLARITIN) 10 MG tablet TAKE 1 TABLET BY MOUTH EVERY DAY 90 tablet 3     methocarbamol (ROBAXIN) 750 MG tablet Take 1 tablet (750 mg) by mouth 3 times daily as needed for muscle spasms 90 tablet 3     metoprolol succinate ER (TOPROL-XL) 50 MG 24 hr tablet Take 1 tablet (50 mg) by mouth 2 times daily 90 tablet 3     montelukast (SINGULAIR) 10 MG tablet TAKE 1 TABLET BY MOUTH EVERYDAY AT BEDTIME 90 tablet 0     NOVOLOG FLEXPEN 100 UNIT/ML soln INJECT 12 UNITS SUBCUTANEOUS 3 TIMES DAILY (WITH MEALS) (Patient taking differently: 11 Units) 15 mL 1     oxyCODONE-acetaminophen (PERCOCET) 5-325 MG tablet Take 1 tablet by mouth 2 times daily as needed for pain May fill and start on  9/21/2022. For chronic pain. 20 tablet 0     OZEMPIC, 1 MG/DOSE, 4 MG/3ML SOPN INJECT 1 MG SUBCUTANEOUS ONCE A WEEK 3 mL 2     pantoprazole (PROTONIX) 40 MG EC tablet TAKE 1 TABLET (40 MG) BY MOUTH DAILY (REPLACES FAMOTIDINE- SHOULD STOP TAKING FAMOTIDINE) 90 tablet 0     predniSONE (DELTASONE) 5 MG tablet Take 1 tablet (5 mg) by mouth daily 90 tablet 1     rivaroxaban ANTICOAGULANT (XARELTO ANTICOAGULANT) 20 MG TABS tablet TAKE 1 TABLET BY MOUTH DAILY WITH DINNER 90 tablet 3     spironolactone (ALDACTONE) 25 MG tablet Take 2 tablets (50 mg) by mouth daily 180 tablet 3     torsemide (DEMADEX) 10 MG tablet TAKE ONE TAB (10 MG) WITH ONE 20 MG TAB TO = 30 MG DAILY IN AFTERNOON. 90 tablet 3     torsemide (DEMADEX) 20 MG tablet TAKE 2 TABLETS (40 MG) BY MOUTH EVERY MORNING AND 1 TABLET (20 MG) DAILY AT 2 PM. TAKE THE AFTERNOON DOSE WITH AN EXTRA 10 MG TAB FOR A TOTAL OF 30 MG IN THE AFTERNOON 270 tablet 3     traZODone (DESYREL) 50 MG tablet Take 3 tablets (150 mg) by mouth nightly as needed for sleep 135 tablet 1        ALLERGIES:     Allergies   Allergen Reactions     Amoxicillin-Pot Clavulanate      Augmentin Nausea and Vomiting     Codeine Nausea and Vomiting     PN: LW Reaction: HIVES     Penicillins Nausea and Vomiting     PN: LW Reaction: GI Upset     Phenobarbital Itching     Seasonal Allergies         FAMILY HISTORY:     Family History   Problem Relation Age of Onset     C.A.D. Mother 63        MI- first at age 63     Heart Disease Mother      Hypertension Mother      Cerebrovascular Disease Mother      Hyperlipidemia Mother      Coronary Artery Disease Mother         MI-first at age 63     Other - See Comments Mother         cerebrovascular disease      Alcohol/Drug Father      Alzheimer Disease Father      Dementia Father      Hypertension Father      Hyperlipidemia Father      Substance Abuse Father      Diabetes Sister      Hypertension Sister      Hyperlipidemia Sister      Substance Abuse Sister       Asthma Sister      C.A.D. Sister 52        Minor MI- age 50's     Heart Disease Sister      Hypertension Sister      Hypertension Sister      Cancer - colorectal Sister 48        Late 40's early 50's     Gastrointestinal Disease Sister         Diverticulitis     Lipids Sister      Lipids Sister      Diabetes Sister      Heart Disease Sister         CHF     Cancer Sister         lung, smoker     Substance Abuse Sister      Asthma Sister      Cancer Sister      Coronary Artery Disease Sister         minor MI-age 50's     Heart Disease Sister      Hypertension Sister      Hypertension Sister      Colon Cancer Sister      Diverticulitis Sister      Lung Cancer Sister      Heart Disease Sister         CHF     Diabetes Sister      Asthma Sister      Hypertension Brother      Prostate Cancer Brother 74        Dx'd age 74     Gastrointestinal Disease Brother         Diverticulitis     Parkinsonism Brother      Substance Abuse Brother      Prostate Cancer Brother      Hyperlipidemia Brother      Hypertension Brother      Prostate Cancer Brother      Diverticulitis Brother      Parkinsonism Brother      Breast Cancer Daughter      Breast Cancer Daughter      Diabetes Other      Hypertension Other      Anesthesia Reaction No family hx of      Deep Vein Thrombosis (DVT) No family hx of         SOCIAL HISTORY:     Social History     Socioeconomic History     Marital status: Single     Number of children: 0     Years of education: Ed Spec De   Occupational History     Occupation: Professor     Employer: [employer]     Comment: [employer]- Education   Tobacco Use     Smoking status: Former Smoker     Packs/day: 0.50     Years: 10.00     Pack years: 5.00     Types: Cigarettes     Quit date: 2011     Years since quittin.1     Smokeless tobacco: Never Used     Tobacco comment:  ppd   Substance and Sexual Activity     Alcohol use: No     Alcohol/week: 0.0 standard drinks     Comment: In  recovery beginning 1986/87     Drug use: No     Sexual activity: Never   Other Topics Concern     Parent/sibling w/ CABG, MI or angioplasty before 65F 55M? Yes   Social History Narrative                    REVIEW OF SYSTEMS:     Constitutional: No fevers or chills  Skin: No new rash or itching  Eyes: No acute change in vision  Ears/Nose/Throat: No purulent rhinorrhea, new hearing loss, or new vertigo  Respiratory: No cough or hemoptysis  Cardiovascular: See HPI  Gastrointestinal: No change in appetite, vomiting, hematemesis or diarrhea  Genitourinary: No dysuria or hematuria  Musculoskeletal: No new back pain, neck pain or muscle pain  Neurologic: No new headaches, focal weakness or behavior changes  Psychiatric: No hallucinations, excessive alcohol consumption or illegal drug usage  Hematologic/Lymphatic/Immunologic: No bleeding, chills, fever, night sweats or weight loss  Endocrine: No new cold intolerance, heat intolerance, polyphagia, polydipsia or polyuria      PHYSICAL EXAMINATION:     /70 (BP Location: Right arm, Patient Position: Chair, Cuff Size: Adult Regular)   Pulse 55   Wt 86.2 kg (190 lb)   SpO2 96%   BMI 29.76 kg/m      GENERAL: No acute distress.  HEENT: EOMI. Sclerae white, not injected. Nares clear. Pharynx without erythema or exudate.   Neck: No adenopathy. No thyromegaly. No jugular venous distension.   Heart: Regular rate and rhythm. No murmur.   Lungs: Clear to auscultation. No ronchi, wheezes, rales.   Abdomen: Soft, nontender, nondistended. Bowel sounds present.  Extremities: No clubbing, cyanosis, or edema.   Neurologic: Alert and oriented to person/place/time, normal speech and affect. No focal deficits.  Skin: No petechiae, purpura or rash.     LABORATORY DATA:     LIPID RESULTS:  Lab Results   Component Value Date    CHOL 104 12/09/2021    CHOL 115 10/20/2020    HDL 59 12/09/2021    HDL 71 10/20/2020    LDL 16 12/09/2021    LDL 19 10/20/2020    TRIG 145 12/09/2021    TRIG 125  10/20/2020    CHOLHDLRATIO 2.5 08/06/2014       LIVER ENZYME RESULTS:  Lab Results   Component Value Date    AST 13 08/23/2022    AST 17 06/04/2021    ALT 14 08/23/2022    ALT 25 06/04/2021       CBC RESULTS:  Lab Results   Component Value Date    WBC 6.6 09/23/2022    WBC 8.6 06/04/2021    RBC 3.96 09/23/2022    RBC 4.46 06/04/2021    HGB 12.0 09/23/2022    HGB 13.4 06/04/2021    HCT 37.5 09/23/2022    HCT 42.1 06/04/2021    MCV 95 09/23/2022    MCV 94 06/04/2021    MCH 30.3 09/23/2022    MCH 30.0 06/04/2021    MCHC 32.0 09/23/2022    MCHC 31.8 06/04/2021    RDW 14.3 09/23/2022    RDW 15.5 (H) 06/04/2021     09/23/2022     06/04/2021       BMP RESULTS:  Lab Results   Component Value Date     09/23/2022     06/04/2021    POTASSIUM 3.5 09/23/2022    POTASSIUM 4.1 08/23/2022    POTASSIUM 4.0 06/04/2021    CHLORIDE 102 09/23/2022    CHLORIDE 107 08/23/2022    CHLORIDE 106 06/04/2021    CO2 25 09/23/2022    CO2 22 08/23/2022    CO2 25 06/04/2021    ANIONGAP 14 09/23/2022    ANIONGAP 8 08/23/2022    ANIONGAP 6 06/04/2021     (H) 09/26/2022     (H) 08/23/2022     (H) 06/04/2021    BUN 15.0 09/23/2022    BUN 17 08/23/2022    BUN 14 06/04/2021    CR 1.17 (H) 09/23/2022    CR 1.11 (H) 06/04/2021    GFRESTIMATED 46 (L) 09/23/2022    GFRESTIMATED 47 (L) 06/04/2021    GFRESTBLACK 54 (L) 06/04/2021    CLAUDY 9.6 09/23/2022    CLAUDY 8.8 06/04/2021        A1C RESULTS:  Lab Results   Component Value Date    A1C 6.4 (H) 08/23/2022    A1C 7.1 (H) 06/04/2021       INR RESULTS:  Lab Results   Component Value Date    INR 1.37 (H) 09/23/2022    INR 1.36 (H) 03/03/2020    INR 2.37 (H) 02/14/2019          PROCEDURES & FURTHER ASSESSMENTS:     Reviewed post procedure studies including TTE       CLINICAL IMPRESSION:     Betty Tee is a very pleasant 82 year old female who presents for 1 week Watchman follow-up.    1. Paroxysmal a-fib with intolerance to AC  2. Recurrent GI bleeding secondary  to GAVE  Now s/p successful implantation of 24mm Watchman FLX. Post-procedure VERONICA showed no evidence of benigno-device leak and no pericardial effusion. Doing well post procedure, groin site is healing well, no bleeding concerns.   - Recommend anticoagulation with Xarelto 15 mg daily x 45 days  - ASA 81 mg daily lifelong   - Follow-up in 45 days with cardiac CT to evaluate endothelialization of the device. If adequate seal is assessed (<5mm), Xarelto will be discontinued and dual antiplatelet therapy with clopidogrel and aspirin will be initiated to continue for total of 6 months from implantation date, with aspirin then continuing lifelong.     3. Chronic diastolic heart failure:  4. HTN  5. HLD  6. ANGELICA  Currently appears euvolemic.  - Continue torsemide 40 mg a.m./30 mg p.m and spironolactone 50 mg daily.  - Continue atorvastatin for cholesterol management  - Continue CPAP     7. Type II DM:   - Insulin dependent with novolog and lantus. Also on Ozempic. Continue current regimen.      8. Severe right hip pain 2/2 OA: Continue current pain regimen per Dr. Alba. Follow-up with orthopedist Dr. Shannon regarding timing of right hip replacement. Once she is 45 days post watchman (11/10/22) she will be safe to hold AC and continue ASA perioperatively.      9. PMR: Continue daily prednisone   10. Sjogrens: Continue Cevimeline for dry mouth  11. Gout: PRN anakinra for gout     RTC: 45 days post watchman with CT and labs prior       LIZY Gonzalez, CNP  [facility] Structural Heart Care       CC  Patient Care Team:  Ilene Tristan MD as PCP - General  Ilene Tristan MD as Assigned PCP  Kirill Polk MD as MD (Otolaryngology)  Aubrey Hurtado MD as MD (Cardiology)  Anderson Perez MD as MD (Clinical Cardiac Electrophysiology)  Radha Rosa MD as MD (Cardiology)  Kiesha Elias APRN CNP as Nurse Practitioner (Cardiology)  Beatriz Blanco RN as Nurse Coordinator  (Cardiology)  Carmenza Stringer MD as MD (Rheumatology)  Danay Payne, RN as Specialty Care Coordinator (Cardiology)  Sabrina Meek RP as Pharmacist (Pharmacist)  Flor Velasquez, RN as Specialty Care Coordinator (Cardiology)  Zulma Lopez MD as MD (Rheumatology)  Kami Lang MD as MD (Ophthalmology)  Reina Betancur MD as MD (Internal Medicine)  Zulma Lopez MD as Referring Physician (Rheumatology)  Zulma Lopez MD as Assigned Rheumatology Provider  Mayrjane Tillman MD as Assigned Pulmonology Provider  Kelechi Santana MD as Assigned Surgical Provider  Radha Rosa MD as Assigned Heart and Vascular Provider  Thomas Shannon MD as Assigned Musculoskeletal Provider  Sabrina Meek ASHISH as Assigned MTM Pharmacist        Please do not hesitate to contact me if you have any questions/concerns.     Sincerely,     Marina Soler NP

## 2022-10-06 NOTE — TELEPHONE ENCOUNTER
Called pt. States that not noticing much difference between Percocet and Norco. She would like to stay on Percocet. Inquired about increase in strength. Pt unable to determine if percocet is helpful but wears off too quickly vs just not that much more beneficial than Norco ( frequency vs dose issue). She is out of medication and has been for 4 days. States pain was intense yesterday but is not as bad today. Advised that would route for review and we would sent prescription over for her.     Routing to provider to review.     Claudia LANDON, RN Care Coordinator  [facility]  Pain Management

## 2022-10-06 NOTE — PROGRESS NOTES
REFERRING PHYSICIAN:  Radha Rosa MD.    CHIEF COMPLAINT:  None.    HISTORY OF PRESENT ILLNESS:  This is an extremely pleasant 82-year-old white female with history of diabetes, chronic kidney disease, obstructive sleep apnea, who was being evaluated for iron deficiency anemia.  She underwent upper endoscopy and colonoscopy.  Colonoscopy showed no colon masses and just left-sided diverticulosis.  Upper endoscopy, however, showed gastric antral vascular ectasia (GAVE).  This GAVE was treated with radiofrequency ablation.  She was subsequently referred to her cardiologist but it was determined that the GAVE in conjunction with the anticoagulation, which she is on for atrial fibrillation was contributing to  overt obscure GI bleed, so subsequently we performed 2 other sessions of upper endoscopy.  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  Of note, she has not needed blood transfusions and she underwent a Watchman procedure by Cardiology and she is required to have anticoagulation for another 45 days (and she is due for another month or a little more than a month).  Clinically, she reports having black stools, which she attributes to ferrous sulfate supplementation as intermittent mild abdominal pain, but has 2 bowel movements.  No norris hematochezia, no hematemesis and has a great appetite.  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    PAST MEDICAL HISTORY:        1. Alcohol abuse, in remission.      2. Atrial fibrillation.      3. Depression.      4. Congestive heart failure.      5. Hypertension.      6. Gastroesophageal reflux disease.      7. Interstitial lung disease.      8. Atrial fibrillation.      9. Major depression.     10. Obstructive sleep apnea.     11. Sjogren syndrome.     12. Osteoarthritis.    PAST  SURGICAL HISTORY:        1. Appendectomy.      2. Back surgery.      3. Breast biopsy.      4. Cardiac surgery.      5. Cholecystectomy.      6. Colonoscopy.      7. EGD with RFA.      8. Left atrial appendage closure.      9. Total abdominal hysterectomy and bilateral salpingo-oophorectomy.     10. Ileostomy.     11. Ureteral stent placement.    FAMILY HISTORY:  No history of colon cancer.    SOCIAL HISTORY:  Former smoker, quit smoking in 2011.  Quit alcohol in 1986/87.  Not sexually active.    ASSESSMENT AND PLAN:  This is an 82-year-old extremely pleasant sister who is accompanied by her medical healthcare decision maker with past medical history significant for diabetes, chronic kidney disease, obstructive sleep apnea, who is being evaluated for iron deficiency anemia.  Her hemoglobin was 8.5 at the time of referral with me, currently improved to 12.3 and without need for blood transfusion after 3 sessions of upper endoscopy with RFA and 1 session of APC for duodenal AVMs.  s/p Watchman procedure, needing anticoagulation for a total of 45 days.    The following are our recommendations:     1. I discussed with the patient that as the anticoagulation is weaned off after 45 days, the risk of bleeding will substantially decrease.  Currently with stable hemoglobin, I do not think she is actively bleeding and will not recommend any further upper endoscopies unless she has a lower GI bleed with hematochezia, hematemesis or norris melena.     2. GI upset is likely from ferrous sulfate.  Can substitute ferrous sulfate to ferrous gluconate or other more GI tolerable iron supplementation.  Otherwise, she can stop ferrous sulfate if the next hemoglobin checked continues to show normal hemoglobin.     3. We will recommend hemoglobin checked in a month,after anticoagulation is stopped.     4. No further EGDs and colonoscopy or clinic visits are warranted.    45 minutes spent on the date of the encounter doing chart review,  review of outside records, review of test results, interpretation of tests, patient visit, documentation and discussion with other provider(s)

## 2022-10-06 NOTE — NURSING NOTE
Chief Complaint   Patient presents with     Follow Up     Return LAAO- 1-2 week follow up after watchman     Vitals were taken and medications reconciled.    Juanjose Lyman, BRIAN  11:24 AM

## 2022-10-06 NOTE — PATIENT INSTRUCTIONS
Follow up:    Dr. Dias has outlined the following steps after today's clinic visit:    Check hemoglobin once you have stopped blood thinners. If hemoglobin remains stable and within normal limits, no additional follow up will be needed with our clinic.     A lab order has been placed for the hemoglobin check. Please call [phone] to schedule.     Betty Cardenas RN Care Coordinator  [phone]    Contacts post-consultation depending on your need:    Schedule Clinic Appointments            [phone] # 1   M-F 7:30 - 5 pm    Yvonne Figueroa RN Care Coordinator (Dr. Almaguer/Dr. Noble)  [phone]    Betty Cardenas RN Care Coordinator (Dr. Dias)   [phone]    Jacki Srivastava RN Care Coordinator (Dr. Garrison/Dr. Frank)  [phone]     OR Procedure Scheduling                                 [phone]    For urgent/emergent questions after business hours, you may reach the on-call GI Fellow by contacting the [facility]  at [phone].    How do I schedule labs, imaging studies, or procedures that were ordered in clinic today?     Labs: To schedule lab appointment at the [facility], use my chart or call [phone]. If you have a [place] lab closer to home where you are regularly seen you can give them a call.     Procedures: If a colonoscopy, upper endoscopy, breath test, esophageal manometry, or pH impedence was ordered today, our endoscopy team will call you to schedule this. If you have not heard from our endoscopy team within a week, please call [phone] to schedule.     Imaging Studies: If you were scheduled for a CT scan, X-ray, MRI, ultrasound, HIDA scan or other imaging study, please call [phone] to have this scheduled.     Referral: If a referral to another specialty was ordered, expect a phone call or follow instructions above. If you have not heard from anyone regarding your referral in a week, please call our clinic to check  the status.     How to I schedule a follow-up visit?  If you did not schedule a follow-up visit today, please call [phone] to schedule a follow-up office visit.

## 2022-10-06 NOTE — TELEPHONE ENCOUNTER
Signed Prescriptions:                        Disp   Refills    oxyCODONE-acetaminophen (PERCOCET) 7.5-325*30 tab*0        Sig: Take 1 tablet by mouth 2 times daily as needed for           severe pain With at least 4 hours between doses.  Authorizing Provider: OMARI ALBA    Dose increased to 7.5/325 mg.     Omari Alba MD  [facility]

## 2022-10-06 NOTE — LETTER
10/6/2022         RE: Betty Tee  [address]  [place] [facility] [place] [address]        Dear Colleague,    Thank you for referring your patient, Betty Tee, to the [facility] [place]. Please see a copy of my visit note below.    REFERRING PHYSICIAN:  Radha Rosa MD.    CHIEF COMPLAINT:  None.    HISTORY OF PRESENT ILLNESS:  This is an extremely pleasant 82-year-old white female with history of diabetes, chronic kidney disease, obstructive sleep apnea, who was being evaluated for iron deficiency anemia.  She underwent upper endoscopy and colonoscopy.  Colonoscopy showed no colon masses and just left-sided diverticulosis.  Upper endoscopy, however, showed gastric antral vascular ectasia (GAVE).  This GAVE was treated with radiofrequency ablation.  She was subsequently referred to her cardiologist but it was determined that the GAVE in conjunction with the anticoagulation, which she is on for atrial fibrillation was contributing to  overt obscure GI bleed, so subsequently we performed 2 other sessions of upper endoscopy.  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  Of note, she has not needed blood transfusions and she underwent a Watchman procedure by Cardiology and she is required to have anticoagulation for another 45 days (and she is due for another month or a little more than a month).  Clinically, she reports having black stools, which she attributes to ferrous sulfate supplementation as intermittent mild abdominal pain, but has 2 bowel movements.  No norris hematochezia, no hematemesis and has a great appetite.  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    PAST MEDICAL HISTORY:        1. Alcohol abuse, in remission.      2. Atrial fibrillation.      3.  Depression.      4. Congestive heart failure.      5. Hypertension.      6. Gastroesophageal reflux disease.      7. Interstitial lung disease.      8. Atrial fibrillation.      9. Major depression.     10. Obstructive sleep apnea.     11. Sjogren syndrome.     12. Osteoarthritis.    PAST SURGICAL HISTORY:        1. Appendectomy.      2. Back surgery.      3. Breast biopsy.      4. Cardiac surgery.      5. Cholecystectomy.      6. Colonoscopy.      7. EGD with RFA.      8. Left atrial appendage closure.      9. Total abdominal hysterectomy and bilateral salpingo-oophorectomy.     10. Ileostomy.     11. Ureteral stent placement.    FAMILY HISTORY:  No history of colon cancer.    SOCIAL HISTORY:  Former smoker, quit smoking in 2011.  Quit alcohol in 1986/87.  Not sexually active.    ASSESSMENT AND PLAN:  This is an 82-year-old extremely pleasant sister who is accompanied by her medical healthcare decision maker with past medical history significant for diabetes, chronic kidney disease, obstructive sleep apnea, who is being evaluated for iron deficiency anemia.  Her hemoglobin was 8.5 at the time of referral with me, currently improved to 12.3 and without need for blood transfusion after 3 sessions of upper endoscopy with RFA and 1 session of APC for duodenal AVMs.  s/p Watchman procedure, needing anticoagulation for a total of 45 days.    The following are our recommendations:     1. I discussed with the patient that as the anticoagulation is weaned off after 45 days, the risk of bleeding will substantially decrease.  Currently with stable hemoglobin, I do not think she is actively bleeding and will not recommend any further upper endoscopies unless she has a lower GI bleed with hematochezia, hematemesis or norris melena.     2. GI upset is likely from ferrous sulfate.  Can substitute ferrous sulfate to ferrous gluconate or other more GI tolerable iron supplementation.  Otherwise, she can stop ferrous sulfate if the  next hemoglobin checked continues to show normal hemoglobin.     3. We will recommend hemoglobin checked in a month,after anticoagulation is stopped.     4. No further EGDs and colonoscopy or clinic visits are warranted.    45 minutes spent on the date of the encounter doing chart review, review of outside records, review of test results, interpretation of tests, patient visit, documentation and discussion with other provider(s)          Sincerely,    Guru Arun MD

## 2022-10-06 NOTE — PATIENT INSTRUCTIONS
You were seen today in the Structural Heart Clinic at the [facility].    Cardiology provider you saw during your visit: Marina Soler NP    Instructions:   Continue aspirin 81 mg daily lifelong  Continue Xarelto x 45 days   Delay any nonurgent dental procedures for the first 6 month post Watchman - call if you need a dental procedure and we will prescribe oral antibiotics to be taken before hand.   Follow-up in 45 days with CT, labs and ECG prior     Prevention of Infective (Bacterial) Endocarditis:  You are at increased risk for developing adverse outcomes from infective endocarditis (IE), also known as bacterial endocarditis (BE) because of the new device in your heart. The guidelines for prevention of IE are to give patients antibiotics prior to any dental procedures that involve manipulation of gingival tissue or the periapical region of teeth, or perforation of the oral mucosa:      It is recommended to take Amoxicillin 2 gm by mouth as a single dose 30 to 60 minutes before procedure.     OR if allergic to Penicillin or Ampicillin:     Cephalexin 2 gm by mouth, or  Clindamycin 600 mg by mouth, or  Azithromycin or Clarithromycin 500 mg PO       Questions and scheduling:   First call: Structural Heart  Stefania Torres [phone]    General scheduling line: [phone].   First press #1 for the University and then press #3 for Medical Questions to reach the Cardiology triage nurse.     On Call Cardiologist for after hours or on weekends: [phone], press option #4 and ask to speak to the on-call Cardiologist.

## 2022-10-06 NOTE — TELEPHONE ENCOUNTER
Called and advised pt of dose increase. Pt advised to call when she has #10 tabs left to discuss how pain is responding to this change. Pt agreeable.    Ilene SHERIDAN RN Care Coordinator  [facility]

## 2022-10-09 ENCOUNTER — HEALTH MAINTENANCE LETTER (OUTPATIENT)
Age: 82
End: 2022-10-09

## 2022-10-17 NOTE — TELEPHONE ENCOUNTER
Pt returning call to nursing.      Jovanni Arora    [facility] [place]

## 2022-10-19 NOTE — TELEPHONE ENCOUNTER
[facility] Call Center    Phone Message    May a detailed message be left on voicemail: yes     Reason for Call: Other: Betty calling due to a missed call. She is requesting a call back at your earliest convenience.     Action Taken: Message routed to:  Other: [facility]    Travel Screening: Not Applicable

## 2022-10-19 NOTE — TELEPHONE ENCOUNTER
[facility] Call Center    Phone Message    May a detailed message be left on voicemail: yes     Reason for Call: Other: Patient is returning a call and would like to speak with the care team. Please call to discuss.     Action Taken: Other: Pain    Travel Screening: Not Applicable

## 2022-10-20 RX ORDER — OXYCODONE AND ACETAMINOPHEN 7.5; 325 MG/1; MG/1
1 TABLET ORAL 2 TIMES DAILY PRN
Qty: 60 TABLET | Refills: 0 | Status: SHIPPED | OUTPATIENT
Start: 2022-10-20 | End: 2022-12-01

## 2022-10-20 NOTE — TELEPHONE ENCOUNTER
Called pt. States that went off medication for a few fays to see if it really does anything, reports that found that it does help take intensity of pain down. Is willing to stay with the current dose. Will need refill ASAP.     Routing to provider to review. Prepped 30day supply     Percocet 7.5-325, 60, Refill:no  Sig:With at least 4 hours between doses. OK to dispense and start 10/20/22  Last picked up 10/06/22 (2 week supply)  Due Now      Last OV: 09/21/22  1. Medication Management:   1. Discussed an opioid rotation to see if she gets better pain relief. She has been noting some benefit with norco but not significant. Will stop norco. Start Percocet 5-325 mg BID prn. 10 day script given. She will my chart to let me know if this is more helpful. If so will provide a 1 month prescription. If not helpful then will resume Norco 7.5-325 mg BID prn.

## 2022-10-29 DIAGNOSIS — I50.32 CHRONIC DIASTOLIC CONGESTIVE HEART FAILURE (H): ICD-10-CM

## 2022-11-01 DIAGNOSIS — I50.43 ACUTE ON CHRONIC COMBINED SYSTOLIC AND DIASTOLIC HEART FAILURE (H): ICD-10-CM

## 2022-11-02 NOTE — TELEPHONE ENCOUNTER
SPIRONOLACTONE 25 MG TABLET  Last Written Prescription Date:   9/20/2021  Last Fill Quantity: 180,   # refills: 3  Last Office Visit :  10/6/2022  Future Office visit:  None    Routing refill request to provider for review/approval because:  Abnormal Creatinine  Refer to clinic for review     Recent Labs   Lab Test 10/05/22  1231   CR 1.06*       Mere Weinberg RN  Central Triage Red Flags/Med Refills

## 2022-11-03 RX ORDER — SPIRONOLACTONE 25 MG/1
50 TABLET ORAL DAILY
Qty: 180 TABLET | Refills: 3 | Status: SHIPPED | OUTPATIENT
Start: 2022-11-03 | End: 2023-04-13

## 2022-11-05 RX ORDER — TORSEMIDE 10 MG/1
TABLET ORAL
Qty: 90 TABLET | Refills: 0 | Status: SHIPPED | OUTPATIENT
Start: 2022-11-05 | End: 2023-02-03

## 2022-11-05 NOTE — TELEPHONE ENCOUNTER
torsemide (DEMADEX) 10 MG tablet     Last Written Prescription Date:  10/5/21  Last Fill Quantity: 90,   # refills: 3  Last Office Visit : 10/6/22  Future Office visit: none    Yonatan Velasquez RN   10/5/2022  1:39 PM CDT Back to Top      Results reviewed by Dr Rosa in clinic with patient     No change  in med.    Routing refill request to provider for review/approval because:  labs ordered,   not drawn.

## 2022-11-09 ENCOUNTER — TELEPHONE (OUTPATIENT)
Dept: FAMILY MEDICINE | Facility: CLINIC | Age: 82
End: 2022-11-09

## 2022-11-09 DIAGNOSIS — R35.0 URINARY FREQUENCY: Primary | ICD-10-CM

## 2022-11-09 NOTE — TELEPHONE ENCOUNTER
KP       thanks Shant.    Dear triage, I have also put you back under my name .  I am in the clinic tomorrow and I can quickly review the urine test ,I hope that helps.  Thanks.

## 2022-11-09 NOTE — TELEPHONE ENCOUNTER
"CW (unsure if you are still here),   Also routing to DOD A.S,     Patient calling   Urinary \"frequency like crazy\"  Odor noted as well   Doesn't have burning   Very tired   Worried might have UTI  Going to [facility] tomorrow to do other labwork   Would like UA/UC order   Advised appt - has future visit with you in a couple weeks, states \"oh don't make this hard on me.\"  Please advise  Thanks,  Lydia HALEY RN      "

## 2022-11-10 ENCOUNTER — HOSPITAL ENCOUNTER (EMERGENCY)
Facility: CLINIC | Age: 82
Discharge: HOME | End: 2022-11-10
Attending: EMERGENCY MEDICINE | Admitting: EMERGENCY MEDICINE
Payer: MEDICARE

## 2022-11-10 ENCOUNTER — HOSPITAL ENCOUNTER (OUTPATIENT)
Dept: CT IMAGING | Facility: CLINIC | Age: 82
Discharge: HOME | End: 2022-11-10
Attending: NURSE PRACTITIONER
Payer: MEDICARE

## 2022-11-10 ENCOUNTER — LAB (OUTPATIENT)
Dept: LAB | Facility: CLINIC | Age: 82
End: 2022-11-10
Attending: FAMILY MEDICINE
Payer: MEDICARE

## 2022-11-10 VITALS
OXYGEN SATURATION: 99 % | SYSTOLIC BLOOD PRESSURE: 138 MMHG | DIASTOLIC BLOOD PRESSURE: 43 MMHG | TEMPERATURE: 98.3 F | WEIGHT: 182 LBS | RESPIRATION RATE: 17 BRPM | HEIGHT: 67 IN | BODY MASS INDEX: 28.56 KG/M2 | HEART RATE: 53 BPM

## 2022-11-10 DIAGNOSIS — I48.21 PERMANENT ATRIAL FIBRILLATION (H): ICD-10-CM

## 2022-11-10 DIAGNOSIS — K92.2 UPPER GI BLEED: ICD-10-CM

## 2022-11-10 DIAGNOSIS — I50.30 HEART FAILURE WITH PRESERVED EJECTION FRACTION, UNSPECIFIED HF CHRONICITY (H): ICD-10-CM

## 2022-11-10 DIAGNOSIS — Z95.818 PRESENCE OF WATCHMAN LEFT ATRIAL APPENDAGE CLOSURE DEVICE: ICD-10-CM

## 2022-11-10 DIAGNOSIS — R94.39 ABNORMAL RESULT OF OTHER CARDIOVASCULAR FUNCTION STUDY: ICD-10-CM

## 2022-11-10 DIAGNOSIS — R35.0 URINARY FREQUENCY: ICD-10-CM

## 2022-11-10 LAB
ABO/RH(D): NORMAL
ALBUMIN SERPL BCG-MCNC: 4 G/DL (ref 3.5–5.2)
ALBUMIN UR-MCNC: 10 MG/DL
ALBUMIN UR-MCNC: NEGATIVE MG/DL
ALP SERPL-CCNC: 87 U/L (ref 35–104)
ALT SERPL W P-5'-P-CCNC: 12 U/L (ref 10–35)
ANION GAP SERPL CALCULATED.3IONS-SCNC: 13 MMOL/L (ref 7–15)
ANION GAP SERPL CALCULATED.3IONS-SCNC: 14 MMOL/L (ref 7–15)
ANTIBODY SCREEN: NEGATIVE
APPEARANCE UR: CLEAR
APPEARANCE UR: CLEAR
APTT PPP: 31 SECONDS (ref 22–38)
AST SERPL W P-5'-P-CCNC: 22 U/L (ref 10–35)
BASOPHILS # BLD AUTO: 0 10E3/UL (ref 0–0.2)
BASOPHILS NFR BLD AUTO: 0 %
BILIRUB SERPL-MCNC: 0.3 MG/DL
BILIRUB UR QL STRIP: NEGATIVE
BILIRUB UR QL STRIP: NEGATIVE
BUN SERPL-MCNC: 18.5 MG/DL (ref 8–23)
BUN SERPL-MCNC: 19.6 MG/DL (ref 8–23)
CALCIUM SERPL-MCNC: 8.9 MG/DL (ref 8.8–10.2)
CALCIUM SERPL-MCNC: 9 MG/DL (ref 8.8–10.2)
CHLORIDE SERPL-SCNC: 104 MMOL/L (ref 98–107)
CHLORIDE SERPL-SCNC: 104 MMOL/L (ref 98–107)
COLOR UR AUTO: ABNORMAL
COLOR UR AUTO: ABNORMAL
CREAT SERPL-MCNC: 1.19 MG/DL (ref 0.51–0.95)
CREAT SERPL-MCNC: 1.23 MG/DL (ref 0.51–0.95)
DEPRECATED HCO3 PLAS-SCNC: 22 MMOL/L (ref 22–29)
DEPRECATED HCO3 PLAS-SCNC: 23 MMOL/L (ref 22–29)
EOSINOPHIL # BLD AUTO: 0 10E3/UL (ref 0–0.7)
EOSINOPHIL NFR BLD AUTO: 1 %
ERYTHROCYTE [DISTWIDTH] IN BLOOD BY AUTOMATED COUNT: 15.2 % (ref 10–15)
ERYTHROCYTE [DISTWIDTH] IN BLOOD BY AUTOMATED COUNT: 15.4 % (ref 10–15)
GFR SERPL CREATININE-BSD FRML MDRD: 44 ML/MIN/1.73M2
GFR SERPL CREATININE-BSD FRML MDRD: 45 ML/MIN/1.73M2
GLUCOSE SERPL-MCNC: 152 MG/DL (ref 70–99)
GLUCOSE SERPL-MCNC: 205 MG/DL (ref 70–99)
GLUCOSE UR STRIP-MCNC: NEGATIVE MG/DL
GLUCOSE UR STRIP-MCNC: NEGATIVE MG/DL
HCT VFR BLD AUTO: 27.3 % (ref 35–47)
HCT VFR BLD AUTO: 27.3 % (ref 35–47)
HGB BLD-MCNC: 8 G/DL (ref 11.7–15.7)
HGB BLD-MCNC: 8.1 G/DL (ref 11.7–15.7)
HGB UR QL STRIP: ABNORMAL
HGB UR QL STRIP: ABNORMAL
HOLD SPECIMEN: NORMAL
IMM GRANULOCYTES # BLD: 0.1 10E3/UL
IMM GRANULOCYTES NFR BLD: 1 %
INR PPP: 1.43 (ref 0.85–1.15)
KETONES UR STRIP-MCNC: NEGATIVE MG/DL
KETONES UR STRIP-MCNC: NEGATIVE MG/DL
LEUKOCYTE ESTERASE UR QL STRIP: NEGATIVE
LEUKOCYTE ESTERASE UR QL STRIP: NEGATIVE
LYMPHOCYTES # BLD AUTO: 0.5 10E3/UL (ref 0.8–5.3)
LYMPHOCYTES NFR BLD AUTO: 7 %
MCH RBC QN AUTO: 30.4 PG (ref 26.5–33)
MCH RBC QN AUTO: 30.5 PG (ref 26.5–33)
MCHC RBC AUTO-ENTMCNC: 29.3 G/DL (ref 31.5–36.5)
MCHC RBC AUTO-ENTMCNC: 29.7 G/DL (ref 31.5–36.5)
MCV RBC AUTO: 103 FL (ref 78–100)
MCV RBC AUTO: 104 FL (ref 78–100)
MONOCYTES # BLD AUTO: 0.6 10E3/UL (ref 0–1.3)
MONOCYTES NFR BLD AUTO: 8 %
MUCOUS THREADS #/AREA URNS LPF: PRESENT /LPF
MUCOUS THREADS #/AREA URNS LPF: PRESENT /LPF
NEUTROPHILS # BLD AUTO: 6.6 10E3/UL (ref 1.6–8.3)
NEUTROPHILS NFR BLD AUTO: 83 %
NITRATE UR QL: NEGATIVE
NITRATE UR QL: NEGATIVE
NRBC # BLD AUTO: 0 10E3/UL
NRBC BLD AUTO-RTO: 0 /100
PH UR STRIP: 6 [PH] (ref 5–7)
PH UR STRIP: 6 [PH] (ref 5–7)
PLATELET # BLD AUTO: 221 10E3/UL (ref 150–450)
PLATELET # BLD AUTO: 224 10E3/UL (ref 150–450)
POTASSIUM SERPL-SCNC: 3.5 MMOL/L (ref 3.4–5.3)
POTASSIUM SERPL-SCNC: 4.3 MMOL/L (ref 3.4–5.3)
PROT SERPL-MCNC: 6.7 G/DL (ref 6.4–8.3)
RBC # BLD AUTO: 2.63 10E6/UL (ref 3.8–5.2)
RBC # BLD AUTO: 2.66 10E6/UL (ref 3.8–5.2)
RBC URINE: 1 /HPF
RBC URINE: 101 /HPF
SODIUM SERPL-SCNC: 140 MMOL/L (ref 136–145)
SODIUM SERPL-SCNC: 140 MMOL/L (ref 136–145)
SP GR UR STRIP: 1.01 (ref 1–1.03)
SP GR UR STRIP: 1.02 (ref 1–1.03)
SPECIMEN EXPIRATION DATE: NORMAL
SQUAMOUS EPITHELIAL: 1 /HPF
TRANSITIONAL EPI: <1 /HPF
UROBILINOGEN UR STRIP-MCNC: NORMAL MG/DL
UROBILINOGEN UR STRIP-MCNC: NORMAL MG/DL
WBC # BLD AUTO: 6 10E3/UL (ref 4–11)
WBC # BLD AUTO: 7.9 10E3/UL (ref 4–11)
WBC URINE: 0 /HPF
WBC URINE: 4 /HPF

## 2022-11-10 PROCEDURE — 250N000011 HC RX IP 250 OP 636: Performed by: INTERNAL MEDICINE

## 2022-11-10 PROCEDURE — 81001 URINALYSIS AUTO W/SCOPE: CPT | Performed by: EMERGENCY MEDICINE

## 2022-11-10 PROCEDURE — 85027 COMPLETE CBC AUTOMATED: CPT | Performed by: EMERGENCY MEDICINE

## 2022-11-10 PROCEDURE — G1010 CDSM STANSON: HCPCS | Performed by: STUDENT IN AN ORGANIZED HEALTH CARE EDUCATION/TRAINING PROGRAM

## 2022-11-10 PROCEDURE — 86850 RBC ANTIBODY SCREEN: CPT | Performed by: EMERGENCY MEDICINE

## 2022-11-10 PROCEDURE — 99283 EMERGENCY DEPT VISIT LOW MDM: CPT | Mod: 25 | Performed by: EMERGENCY MEDICINE

## 2022-11-10 PROCEDURE — 75572 CT HRT W/3D IMAGE: CPT | Mod: 26 | Performed by: STUDENT IN AN ORGANIZED HEALTH CARE EDUCATION/TRAINING PROGRAM

## 2022-11-10 PROCEDURE — G1010 CDSM STANSON: HCPCS

## 2022-11-10 PROCEDURE — 86901 BLOOD TYPING SEROLOGIC RH(D): CPT | Performed by: EMERGENCY MEDICINE

## 2022-11-10 PROCEDURE — 85610 PROTHROMBIN TIME: CPT | Performed by: EMERGENCY MEDICINE

## 2022-11-10 PROCEDURE — 85027 COMPLETE CBC AUTOMATED: CPT

## 2022-11-10 PROCEDURE — 36415 COLL VENOUS BLD VENIPUNCTURE: CPT | Performed by: EMERGENCY MEDICINE

## 2022-11-10 PROCEDURE — 81001 URINALYSIS AUTO W/SCOPE: CPT

## 2022-11-10 PROCEDURE — 99284 EMERGENCY DEPT VISIT MOD MDM: CPT | Performed by: EMERGENCY MEDICINE

## 2022-11-10 PROCEDURE — 80053 COMPREHEN METABOLIC PANEL: CPT

## 2022-11-10 PROCEDURE — 36415 COLL VENOUS BLD VENIPUNCTURE: CPT

## 2022-11-10 PROCEDURE — 85730 THROMBOPLASTIN TIME PARTIAL: CPT | Performed by: EMERGENCY MEDICINE

## 2022-11-10 RX ORDER — METOPROLOL SUCCINATE 50 MG/1
50 TABLET, EXTENDED RELEASE ORAL 2 TIMES DAILY
Qty: 90 TABLET | Refills: 3 | Status: SHIPPED | OUTPATIENT
Start: 2022-11-10 | End: 2023-05-08

## 2022-11-10 RX ORDER — IOPAMIDOL 755 MG/ML
120 INJECTION, SOLUTION INTRAVASCULAR ONCE
Status: COMPLETED | OUTPATIENT
Start: 2022-11-10 | End: 2022-11-10

## 2022-11-10 RX ADMIN — IOPAMIDOL 120 ML: 755 INJECTION, SOLUTION INTRAVENOUS at 10:14

## 2022-11-10 ASSESSMENT — ACTIVITIES OF DAILY LIVING (ADL)
ADLS_ACUITY_SCORE: 37
ADLS_ACUITY_SCORE: 35

## 2022-11-10 NOTE — ED TRIAGE NOTES
Brought in from home. MD called with abnormal labs. RBCs in UA, Hgb drop from 12 to 8. + fatigue.  Denies hematuria, but does report inability to empty bladder completely.. Eric in the 50s, baseline per pt. Other VSS. AAOx4. Chronic right hip pain.     Triage Assessment     Row Name 11/10/22 8768       Triage Assessment (Adult)    Airway WDL WDL       Respiratory WDL    Respiratory WDL WDL       Skin Circulation/Temperature WDL    Skin Circulation/Temperature WDL WDL       Cardiac WDL    Cardiac WDL WDL       Peripheral/Neurovascular WDL    Peripheral Neurovascular WDL WDL       Cognitive/Neuro/Behavioral WDL    Cognitive/Neuro/Behavioral WDL WDL

## 2022-11-10 NOTE — TELEPHONE ENCOUNTER
[facility] Call Center    Phone Message    May a detailed message be left on voicemail: no     Reason for Call: Medication Question or concern regarding medication   Prescription Clarification  Name of Medication: metoprolol succinate ER (TOPROL-XL) 50 MG 24 hr tablet  Prescribing Provider: Amy Moreno MD    Pharmacy: [id number] - [facility] [address]   What on the order needs clarification?   Refill request. Pt is completely out of this medication, high priority          Action Taken: Message routed to:  [facility] ([facility] Cardiology    Travel Screening: Not Applicable

## 2022-11-10 NOTE — TELEPHONE ENCOUNTER
Reviewed UA- no WBC or suggestions of infection.  BUT, does have many RBC's.  Reviewed CBC from cardiology today, and hgb down from 12 to 8.0.  Needs evaluation.  Likely ER to get studies she may need to figure out what is going on.  Thanks- CW

## 2022-11-10 NOTE — TELEPHONE ENCOUNTER
metoprolol succinate ER (TOPROL XL) 50 MG 24 hr tablet  Take 1 tablet (50 mg) by mouth 2 times daily - Oral    Last Written Prescription Date:  2-11-22  Last Fill Quantity: 90,   # refills: 3  Last Office Visit : 10-6-22  Future Office visit:  none

## 2022-11-11 ENCOUNTER — TELEPHONE (OUTPATIENT)
Dept: CARDIOLOGY | Facility: CLINIC | Age: 82
End: 2022-11-11

## 2022-11-11 ENCOUNTER — TELEPHONE (OUTPATIENT)
Dept: GASTROENTEROLOGY | Facility: CLINIC | Age: 82
End: 2022-11-11

## 2022-11-11 NOTE — DISCHARGE INSTRUCTIONS
Follow-up with your cardiology office tomorrow to inquire about when you can stop taking Xarelto (rivaroxaban).    GI clinic will reach out to you to schedule endoscopy procedure.    Return to the emergency department for worsening lightheadedness or fatigue, shortness of breath, any indication of increased bleeding, fainting, or any other problems.

## 2022-11-11 NOTE — TELEPHONE ENCOUNTER
Reviewed with Nghia, this would be better addressed by structural team, as the CT was done to see if the Xeralto could be stopped. Will forward to them.  Nghia also asked if she should contact [facility]'s GI team to schedule the EGD. Confirmed that was the best path.

## 2022-11-11 NOTE — TELEPHONE ENCOUNTER
Refilled the culturelle- printed out- will sign and bring to triage.  Will review the others at her upcoming visit.  Thanks!  CW   (V5) oriented

## 2022-11-11 NOTE — ED PROVIDER NOTES
ED Provider Note  [facility]      History     Chief Complaint   Patient presents with     Abnormal Labs     HPI  Betty Tee is a 82 year old female who  has a past medical history of Alcohol abuse, in remission, Allergic rhinitis, cause unspecified, Antiplatelet or antithrombotic long-term use, Atrial fibrillation (H), Cardiomegaly, Chest pain, unspecified, Congestive heart failure (H), Depressive disorder, Diabetes (H), Disorder of bone and cartilage, unspecified, Diverticulosis of colon (without mention of hemorrhage), Essential hypertension, benign, Follicular bronchiolitis (H), Gastro-oesophageal reflux disease, ILD (interstitial lung disease) (H), Insomnia, unspecified, Irregular heart beat, Lumbago (7/09), Major depressive disorder, recurrent episode, moderate (H), Obstructive sleep apnea, Osteoarthrosis, unspecified whether generalized or localized, unspecified site, Sjogren's syndrome (H), Sleep apnea, and Tobacco use disorder.    She is currently anticoagulated on Xarelto and has a cardiac watchman device.  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  Her last endoscopy was in July and she reports at that time she had undergone a procedure to decrease the bleeding but was told that she would continue to bleed while on Xarelto.  She does report that she has been feeling somewhat fatigued and cold and after having a CT scan today to evaluate her cardiac watchman device to help decide if she can stop taking Xarelto she did have blood work which demonstrated a 4 g hemoglobin drop from her previously drawn specimen approximately 1 month ago.  Hemoglobin had dropped from around 12-8.  She denies syncope or presyncope, chest pain or shortness of breath.  She denies nausea or vomiting and has no other complaints at this time.      Past Medical History  Past Medical  History:   Diagnosis Date     Alcohol abuse, in remission      Allergic rhinitis, cause unspecified     allegra helps when she takes it     Antiplatelet or antithrombotic long-term use      Atrial fibrillation (H)     in hosp in 11/11 after surgery w/ fluid overload     Cardiomegaly     LVH on stress echo- cardiac w/u at [facility] ER- neg CT scan for PE, neg stress echo in 8/06     Chest pain, unspecified      Congestive heart failure (H)      Depressive disorder      Diabetes (H)      Disorder of bone and cartilage, unspecified     osteopenia (had been on prempro), improved on 6/06 dexa, stable dexa 11/10     Diverticulosis of colon (without mention of hemorrhage)     last episode yrs ago     Essential hypertension, benign      Follicular bronchiolitis (H)     associated with Sjogrens, dx by chest CT showing mosaic attenuation and air trapping     Gastro-oesophageal reflux disease      ILD (interstitial lung disease) (H)     associated with Sjogrens, also has mildly elevated IgG4, first noted on chest CT 2015 (mild changes) and also has small airways disease; ILD improved on follow up chest CT 2018.     Insomnia, unspecified     weaned off clonazepam     Irregular heart beat      Lumbago 7/09    MRI with NEAL, now seeing Dr. Cain for sciatic sx's     Major depressive disorder, recurrent episode, moderate (H)      Obstructive sleep apnea     uses cpap     Osteoarthrosis, unspecified whether generalized or localized, unspecified site      Sjogren's syndrome (H)     + RG and SSA and lip bx     Sleep apnea      Tobacco use disorder     chantix in 9/07, started again in 6/08, working     Past Surgical History:   Procedure Laterality Date     APPENDECTOMY       BACK SURGERY  1962     BACK SURGERY  1962     BIOPSY BREAST  09/27/2002    Biopsy Left Breast     BIOPSY BREAST Left 09/27/2002     BREAST SURGERY       CARDIAC SURGERY       CARDIAC SURGERY       CHOLECYSTECTOMY  1990's?     CHOLECYSTECTOMY       COLECTOMY LEFT   11/07/2011    Procedure:COLECTOMY LEFT; Laparoscopic mobilization of splenic flexture, sigmoid colectomy, coloprotoscopy, loop illeostomy; Surgeon:CK SIDDIQI; Location:[facility] OR     COLECTOMY LEFT  11/07/2011     COLONOSCOPY  1990,s     COLONOSCOPY N/A 5/3/2022    Procedure: COLONOSCOPY;  Surgeon: Guru Winsome Dias MD;  Location: [facility] GI     CV LEFT ATRIAL APPENDAGE CLOSURE N/A 9/26/2022    Procedure: Left Atrial Appendage Closure;  Surgeon: Uzair Crews MD;  Location: [facility]     ENT SURGERY       EYE SURGERY  2012     FLEXIBLE SIGMOIDOSCOPY  11/03/2011     HYSTERECTOMY TOTAL ABDOMINAL, BILATERAL SALPINGO-OOPHORECTOMY, COMBINED  11/07/2011    Procedure:COMBINED HYSTERECTOMY TOTAL ABDOMINAL, BILATERAL SALPINGO-OOPHORECTOMY; total abdominal hysterectomy, bilateral salpingo-oophorectomy; Surgeon:ALETA MANUEL; Location:[facility] OR     HYSTERECTOMY TOTAL ABDOMINAL, BILATERAL SALPINGO-OOPHORECTOMY, COMBINED  11/07/2011     ILEOSTOMY  02/01/2012    takedown loop ileostomy      INSERT STENT URETER  11/07/2011    Procedure:INSERT STENT URETER; Placement of Bilateral Ureteral Stents ; Surgeon:PRANEETH BRYANT; Location:[facility] OR     SIGMOIDECTOMY  02/01/2012    Dr. Siddiqi, Sigmoidectomy for diverticular abscess, iliostomy afterwards until repair     SIGMOIDOSCOPY FLEXIBLE  11/03/2011    Procedure:SIGMOIDOSCOPY FLEXIBLE; Flexible Sigmoidoscopy; Surgeon:CK SIDDIQI; Location:[facility] OR     TAKEDOWN ILEOSTOMY  02/01/2012    Procedure:TAKEDOWN ILEOSTOMY; Takedown Loop Ileostomy ; Surgeon:CK SIDDIQI; Location:[facility] OR     URETERAL STENT PLACEMENT  11/07/2011     [facility] APPENDECTOMY  1970's?     [facility] NONSPECIFIC PROCEDURE  11/2005    exploratory abd lap, adhesions, resolved RLQ pain, diverticulitis episodes     ACCU-CHEK SHANNON PLUS test strip  acetaminophen (TYLENOL) 500 MG tablet  acyclovir (ZOVIRAX) 400 MG tablet  acyclovir (ZOVIRAX) 5 % external ointment  albuterol (PROAIR HFA/PROVENTIL  HFA/VENTOLIN HFA) 108 (90 Base) MCG/ACT inhaler  alendronate (FOSAMAX) 70 MG tablet  anakinra (KINERET) 100 MG/0.67ML SOSY injection  aspirin (ASA) 81 MG EC tablet  atorvastatin (LIPITOR) 10 MG tablet  azithromycin (ZITHROMAX) 250 MG tablet  BD PEN NEEDLE JANE 2ND GEN 32G X 4 MM miscellaneous  blood glucose (NO BRAND SPECIFIED) lancets standard  cevimeline (EVOXAC) 30 MG capsule  Cholecalciferol (VITAMIN D3) 50 MCG (2000 UT) CAPS  COMPOUNDED NON-CONTROLLED SUBSTANCE (CMPD RX) - PHARMACY TO MIX COMPOUNDED MEDICATION  cyanocobalamin (VITAMIN B-12) 1000 MCG tablet  escitalopram (LEXAPRO) 20 MG tablet  ferrous sulfate (FEROSUL) 325 (65 Fe) MG tablet  fluticasone (FLONASE) 50 MCG/ACT nasal spray  fluticasone-vilanterol (BREO ELLIPTA) 100-25 MCG/INH inhaler  hydroxychloroquine (PLAQUENIL) 200 MG tablet  insulin glargine (BASAGLAR KWIKPEN) 100 UNIT/ML pen  ketoconazole (NIZORAL) 2 % external cream  KLOR-CON 20 MEQ CR tablet  lidocaine (LIDODERM) 5 % patch  loratadine (CLARITIN) 10 MG tablet  methocarbamol (ROBAXIN) 750 MG tablet  metoprolol succinate ER (TOPROL XL) 50 MG 24 hr tablet  montelukast (SINGULAIR) 10 MG tablet  NOVOLOG FLEXPEN 100 UNIT/ML soln  oxyCODONE-acetaminophen (PERCOCET) 5-325 MG tablet  oxyCODONE-acetaminophen (PERCOCET) 7.5-325 MG per tablet  OZEMPIC, 1 MG/DOSE, 4 MG/3ML SOPN  pantoprazole (PROTONIX) 40 MG EC tablet  predniSONE (DELTASONE) 5 MG tablet  rivaroxaban ANTICOAGULANT (XARELTO ANTICOAGULANT) 20 MG TABS tablet  spironolactone (ALDACTONE) 25 MG tablet  torsemide (DEMADEX) 10 MG tablet  torsemide (DEMADEX) 20 MG tablet  traZODone (DESYREL) 50 MG tablet      Allergies   Allergen Reactions     Amoxicillin-Pot Clavulanate      Augmentin Nausea and Vomiting     Codeine Nausea and Vomiting     PN: LW Reaction: HIVES     Penicillins Nausea and Vomiting     PN: LW Reaction: GI Upset     Phenobarbital Itching     Seasonal Allergies      Family History  Family History   Problem Relation Age of Onset      C.A.D. Mother 63        MI- first at age 63     Heart Disease Mother      Hypertension Mother      Cerebrovascular Disease Mother      Hyperlipidemia Mother      Coronary Artery Disease Mother         MI-first at age 63     Other - See Comments Mother         cerebrovascular disease      Alcohol/Drug Father      Alzheimer Disease Father      Dementia Father      Hypertension Father      Hyperlipidemia Father      Substance Abuse Father      Diabetes Sister      Hypertension Sister      Hyperlipidemia Sister      Substance Abuse Sister      Asthma Sister      C.A.D. Sister 52        Minor MI- age 50's     Heart Disease Sister      Hypertension Sister      Hypertension Sister      Cancer - colorectal Sister 48        Late 40's early 50's     Gastrointestinal Disease Sister         Diverticulitis     Lipids Sister      Lipids Sister      Diabetes Sister      Heart Disease Sister         CHF     Cancer Sister         lung, smoker     Substance Abuse Sister      Asthma Sister      Cancer Sister      Coronary Artery Disease Sister         minor MI-age 50's     Heart Disease Sister      Hypertension Sister      Hypertension Sister      Colon Cancer Sister      Diverticulitis Sister      Lung Cancer Sister      Heart Disease Sister         CHF     Diabetes Sister      Asthma Sister      Hypertension Brother      Prostate Cancer Brother 74        Dx'd age 74     Gastrointestinal Disease Brother         Diverticulitis     Parkinsonism Brother      Substance Abuse Brother      Prostate Cancer Brother      Hyperlipidemia Brother      Hypertension Brother      Prostate Cancer Brother      Diverticulitis Brother      Parkinsonism Brother      Breast Cancer Daughter      Breast Cancer Daughter      Diabetes Other      Hypertension Other      Anesthesia Reaction No family hx of      Deep Vein Thrombosis (DVT) No family hx of      Social History   Social History     Tobacco Use     Smoking status: Former     Packs/day: 0.50      "Years: 10.00     Pack years: 5.00     Types: Cigarettes     Quit date: 2011     Years since quittin.2     Smokeless tobacco: Never     Tobacco comments:      ppd   Substance Use Topics     Alcohol use: No     Alcohol/week: 0.0 standard drinks     Comment: In recovery beginning      Drug use: No      Past medical history, past surgical history, medications, allergies, family history, and social history were reviewed with the patient. No additional pertinent items.       Review of Systems  A complete review of systems was performed with pertinent positives and negatives noted in the HPI, and all other systems negative.    Physical Exam   BP: 132/72  Pulse: 54  Temp: 98.6  F (37  C)  Resp: 17  Height: 170.2 cm (5' 7\")  Weight: 82.6 kg (182 lb)  SpO2: 99 %  Physical Exam  Vitals and nursing note reviewed.   Constitutional:       General: She is not in acute distress.     Appearance: She is well-developed. She is not ill-appearing, toxic-appearing or diaphoretic.   HENT:      Head: Normocephalic and atraumatic.      Mouth/Throat:      Lips: Pink.      Mouth: Mucous membranes are moist.      Pharynx: Oropharynx is clear. No oropharyngeal exudate.   Eyes:      General: Lids are normal. No scleral icterus.     Extraocular Movements: Extraocular movements intact.      Right eye: No nystagmus.      Left eye: No nystagmus.      Conjunctiva/sclera: Conjunctivae normal.      Pupils: Pupils are equal, round, and reactive to light.   Neck:      Thyroid: No thyromegaly.      Vascular: No JVD.      Trachea: No tracheal deviation.   Cardiovascular:      Rate and Rhythm: Normal rate and regular rhythm.      Pulses: Normal pulses.      Heart sounds: Normal heart sounds. No murmur heard.    No friction rub. No gallop.   Pulmonary:      Effort: Pulmonary effort is normal. No respiratory distress.      Breath sounds: Normal breath sounds.   Abdominal:      General: Bowel sounds are normal. There is no distension.      " Palpations: Abdomen is soft. There is no mass.      Tenderness: There is no abdominal tenderness. There is no guarding or rebound.   Genitourinary:     Rectum: Guaiac result positive.      Comments: Melanotic Hemoccult positive stool noted on digital rectal exam  Musculoskeletal:         General: No tenderness. Normal range of motion.      Cervical back: Normal range of motion and neck supple. No erythema or rigidity.      Right lower leg: No edema.      Left lower leg: No edema.   Lymphadenopathy:      Cervical: No cervical adenopathy.   Skin:     General: Skin is warm and dry.      Capillary Refill: Capillary refill takes less than 2 seconds.      Coloration: Skin is not pale.      Findings: No erythema or rash.   Neurological:      Mental Status: She is alert and oriented to person, place, and time.      Cranial Nerves: No cranial nerve deficit.      Sensory: No sensory deficit.      Motor: Motor function is intact.   Psychiatric:         Mood and Affect: Mood and affect normal.         Speech: Speech normal.         Behavior: Behavior normal.         ED Course      Procedures                     Results for orders placed or performed during the hospital encounter of 11/10/22   INR     Status: Abnormal   Result Value Ref Range    INR 1.43 (H) 0.85 - 1.15   Partial thromboplastin time     Status: Normal   Result Value Ref Range    aPTT 31 22 - 38 Seconds   Comprehensive metabolic panel     Status: Abnormal   Result Value Ref Range    Sodium 140 136 - 145 mmol/L    Potassium 4.3 3.4 - 5.3 mmol/L    Chloride 104 98 - 107 mmol/L    Carbon Dioxide (CO2) 23 22 - 29 mmol/L    Anion Gap 13 7 - 15 mmol/L    Urea Nitrogen 18.5 8.0 - 23.0 mg/dL    Creatinine 1.19 (H) 0.51 - 0.95 mg/dL    Calcium 9.0 8.8 - 10.2 mg/dL    Glucose 152 (H) 70 - 99 mg/dL    Alkaline Phosphatase 87 35 - 104 U/L    AST 22 10 - 35 U/L    ALT 12 10 - 35 U/L    Protein Total 6.7 6.4 - 8.3 g/dL    Albumin 4.0 3.5 - 5.2 g/dL    Bilirubin Total 0.3 <=1.2  mg/dL    GFR Estimate 45 (L) >60 mL/min/1.73m2   [place] Draw     Status: None    Narrative    The following orders were created for panel order [place] Draw.  Procedure                               Abnormality         Status                     ---------                               -----------         ------                     Extra Blue Top Tube[[id number]]                              Final result               Extra Red Top Tube[[id number]]                               Final result               Extra Green Top (Lithium...[[id number]]                      Final result               Extra Purple Top Tube[[id number]]                            Final result                 Please view results for these tests on the individual orders.   CBC with platelets and differential     Status: Abnormal   Result Value Ref Range    WBC Count 7.9 4.0 - 11.0 10e3/uL    RBC Count 2.66 (L) 3.80 - 5.20 10e6/uL    Hemoglobin 8.1 (L) 11.7 - 15.7 g/dL    Hematocrit 27.3 (L) 35.0 - 47.0 %     (H) 78 - 100 fL    MCH 30.5 26.5 - 33.0 pg    MCHC 29.7 (L) 31.5 - 36.5 g/dL    RDW 15.2 (H) 10.0 - 15.0 %    Platelet Count 224 150 - 450 10e3/uL    % Neutrophils 83 %    % Lymphocytes 7 %    % Monocytes 8 %    % Eosinophils 1 %    % Basophils 0 %    % Immature Granulocytes 1 %    NRBCs per 100 WBC 0 <1 /100    Absolute Neutrophils 6.6 1.6 - 8.3 10e3/uL    Absolute Lymphocytes 0.5 (L) 0.8 - 5.3 10e3/uL    Absolute Monocytes 0.6 0.0 - 1.3 10e3/uL    Absolute Eosinophils 0.0 0.0 - 0.7 10e3/uL    Absolute Basophils 0.0 0.0 - 0.2 10e3/uL    Absolute Immature Granulocytes 0.1 <=0.4 10e3/uL    Absolute NRBCs 0.0 10e3/uL   Extra Blue Top Tube     Status: None   Result Value Ref Range    Hold Specimen JIC    Extra Red Top Tube     Status: None   Result Value Ref Range    Hold Specimen JIC    Extra Green Top (Lithium Heparin) Tube     Status: None   Result Value Ref Range    Hold Specimen JIC    Extra Purple Top Tube     Status: None   Result Value  Ref Range    Hold Specimen [facility]    UA with Microscopic reflex to Culture     Status: Abnormal    Specimen: Urine, Midstream   Result Value Ref Range    Color Urine Straw Colorless, Straw, Light Yellow, Yellow    Appearance Urine Clear Clear    Glucose Urine Negative Negative mg/dL    Bilirubin Urine Negative Negative    Ketones Urine Negative Negative mg/dL    Specific Gravity Urine 1.018 1.003 - 1.035    Blood Urine Trace (A) Negative    pH Urine 6.0 5.0 - 7.0    Protein Albumin Urine Negative Negative mg/dL    Urobilinogen Urine Normal Normal, 2.0 mg/dL    Nitrite Urine Negative Negative    Leukocyte Esterase Urine Negative Negative    Mucus Urine Present (A) None Seen /LPF    RBC Urine 1 <=2 /HPF    WBC Urine 4 <=5 /HPF    Narrative    Urine Culture not indicated   Adult Type and Screen     Status: None   Result Value Ref Range    ABO/RH(D) O POS     Antibody Screen Negative Negative    SPECIMEN EXPIRATION DATE [id number]    CBC with platelets differential     Status: Abnormal    Narrative    The following orders were created for panel order CBC with platelets differential.  Procedure                               Abnormality         Status                     ---------                               -----------         ------                     CBC with platelets and d...[[id number]]  Abnormal            Final result                 Please view results for these tests on the individual orders.   ABO/Rh type and screen     Status: None    Narrative    The following orders were created for panel order ABO/Rh type and screen.  Procedure                               Abnormality         Status                     ---------                               -----------         ------                     Adult Type and Screen[[id number]]                            Final result                 Please view results for these tests on the individual orders.   Results for orders placed or performed during the hospital  encounter of 11/10/22   Radiologist Consult For Cardiology     Status: None    Narrative    EXAM: RADIOLOGIST CONSULT FOR CARDIOLOGY  11/10/2022 10:46 AM     HISTORY:  Presence of Watchman left atrial appendage closure device;  Abnormal result of other cardiovascular function study       COMPARISON:  CT 8/5/2022    TECHNIQUE: CT imaging obtained through the mid chest with contrast.  Axial MIP reformatted images obtained and reviewed.     CONTRAST:  120 mL Isovue-370    FINDINGS:    Mediastinum: The distal tracheobronchial tree is patent. Heart size is  normal. No pericardial effusion.  No thoracic lymphadenopathy by size  criteria. Left atrial appendage watchman device. Enlarged right main  pulmonary artery measuring up to 3.3 cm    Lungs: No suspicious pulmonary nodule. . No focal airspace  consolidation, pleural effusion, or pneumothorax. Bibasilar  atelectasis.    Bones and soft tissues: No suspicious osseous lesions.     Upper abdomen: Limited.       Impression    IMPRESSION: No acute findings in the chest. Please see cardiology  report for detailed analysis of the cardiac structures.    I have personally reviewed the examination and initial interpretation  and I agree with the findings.    LLOYD GONG MD         SYSTEM ID:  [id number]   Results for orders placed or performed in visit on 11/10/22   Basic metabolic panel     Status: Abnormal   Result Value Ref Range    Sodium 140 136 - 145 mmol/L    Potassium 3.5 3.4 - 5.3 mmol/L    Chloride 104 98 - 107 mmol/L    Carbon Dioxide (CO2) 22 22 - 29 mmol/L    Anion Gap 14 7 - 15 mmol/L    Urea Nitrogen 19.6 8.0 - 23.0 mg/dL    Creatinine 1.23 (H) 0.51 - 0.95 mg/dL    Calcium 8.9 8.8 - 10.2 mg/dL    Glucose 205 (H) 70 - 99 mg/dL    GFR Estimate 44 (L) >60 mL/min/1.73m2   CBC with platelets     Status: Abnormal   Result Value Ref Range    WBC Count 6.0 4.0 - 11.0 10e3/uL    RBC Count 2.63 (L) 3.80 - 5.20 10e6/uL    Hemoglobin 8.0 (L) 11.7 - 15.7 g/dL    Hematocrit  27.3 (L) 35.0 - 47.0 %     (H) 78 - 100 fL    MCH 30.4 26.5 - 33.0 pg    MCHC 29.3 (L) 31.5 - 36.5 g/dL    RDW 15.4 (H) 10.0 - 15.0 %    Platelet Count 221 150 - 450 10e3/uL   UA with Microscopic reflex to Culture - lab collect     Status: Abnormal    Specimen: Urine, Clean Catch   Result Value Ref Range    Color Urine Light Yellow Colorless, Straw, Light Yellow, Yellow    Appearance Urine Clear Clear    Glucose Urine Negative Negative mg/dL    Bilirubin Urine Negative Negative    Ketones Urine Negative Negative mg/dL    Specific Gravity Urine 1.010 1.003 - 1.035    Blood Urine Large (A) Negative    pH Urine 6.0 5.0 - 7.0    Protein Albumin Urine 10 (A) Negative mg/dL    Urobilinogen Urine Normal Normal, 2.0 mg/dL    Nitrite Urine Negative Negative    Leukocyte Esterase Urine Negative Negative    Mucus Urine Present (A) None Seen /LPF    RBC Urine 101 (H) <=2 /HPF    WBC Urine 0 <=5 /HPF    Squamous Epithelials Urine 1 <=1 /HPF    Transitional Epithelials Urine <1 <=1 /HPF    Narrative    Urine Culture not indicated     Medications - No data to display     Assessments & Plan (with Medical Decision Making)     This patient presented to the emergency department for further evaluation of decreased hemoglobin levels.  This is most likely secondary to GI losses from an upper GI bleed, most likely GAVE.  Compounding this is the fact that she is on Xarelto.  She is hemodynamically stable and repeat hemoglobin has not changed from hemoglobin earlier today.  No evidence of acute kidney injury or elevated BUN.  I did speak with GI.  The patient would prefer not to be admitted to the hospital and would rather try to schedule an outpatient procedure.  I did discuss this with the patient and let her know that being admitted would most likely result in her getting her endoscopy more rapidly, but as she is hemodynamically stable and this seems to be more of a chronic process for her she could be discharged home.  She does  report that should she begin to feel more symptomatic or begin to have severe lightheadedness or presyncope she will return to the emergency department immediately.  Referral was placed for urgent endoscopy and patient also reports she will be following up with her cardiologist tomorrow to discuss whether or not she will be able to stop taking Xarelto as this will most likely help her situation from the GI standpoint.  She was discharged with her friend in good condition.    I have reviewed the nursing notes. I have reviewed the findings, diagnosis, plan and need for follow up with the patient.    Discharge Medication List as of 11/10/2022  7:26 PM          Final diagnoses:   Upper GI bleed       --  Carrillo Deluna MD    [facility] EMERGENCY DEPARTMENT  11/10/2022     Carrillo Deluna MD  11/10/22 212

## 2022-11-11 NOTE — TELEPHONE ENCOUNTER
Screening Questions    BlueKIND OF PREP RedLOCATION [review exclusion criteria] GreenSEDATION TYPE    N Have you had a positive covid test in the last 90 days?   If yes, what date?      Y Your able to give consent for your medical care?    Y Are you active on mychart?     MEDICARE BCBSWhat insurance do you carry?      TRINIDAD KHANNA Ordering/Referring Provider:     28.5 BMI [BMI OVER 40-EXTENDED PREP]  BMI OVER 40 NEED PAC EVALUATION FOR [facility]    Respiratory Screening  [If yes to any of the following HOSPITAL setting only]     N      Do you use daily home oxygen?   Y     Do you have mod to severe Obstructive Sleep Apnea?  N      Do you have Pulmonary Hypertension? NEED PAC APPT AT [facility]    N      Do you have UNCONTROLLED asthma?      N Have you had a heart or lung transplant?       N Are you currently on dialysis? [If yes, G-PREP & HOSPITAL setting only]   Y Do you have chronic kidney disease? [If yes, G-PREP]  Y Do you have a diagnosis of diabetes?[If yes, G-PREP]    N Have you had a stroke or Transient ischemic attack (TIA - aka  mini stroke ) within 6 months? (If yes, please review exclusion criteria)  N  In the past 6 months, have you had any heart related issues including cardiomyopathy or heart attack?   N  If yes, did it require cardiac stenting or other implantable device?       N Do you have any implantable devices in your body (pacemaker, defib, LVAD)? (If yes, please review exclusion criteria)    N Do you take nitroglycerin?   N If yes, how often?  (If yes, HOSPITAL setting ONLY)  N Are you currently taking any blood thinners?           [IF YES, INFORM PATIENT TO FOLLOW UP W/ ORDERING PROVIDER FOR BRIDGING INSTRUCTIONS]     N  Do you take Phentermine?   Yes-> Hold for 7 days before procedure.  Please consult your prescribing provider if you have questions about holding this medication.         [FEMALES] are you currently  pregnant?       If yes, how many weeks? [Greater than 12 weeks, OR NEEDED]    N Any prescription pain medications taken daily like narcotics? [EXTENDED PREP AND MAC]    N Any chemical dependencies such as alcohol, street drugs, or methadone? [If yes, MAC]    N Any post-traumatic stress syndrome, severe anxiety or history of psychosis? [If yes, MAC]    N WITH HELP USED WALKER Can you transfer from bed to chair? (If NO, please HOSPITAL setting  only)     Y  On a regular basis do you go 3-5 days without a bowel movement?[ If yes, EXTENDED PREP.]     Preferred LOCAL Pharmacy for Pre Prescription:      [id number] - [place], [place] - [address]                        Scheduling Details    Betty Caller:   (Please ask for phone number if not scheduled by patient)    Type of Procedure Scheduled: egd     colonoscopy Prep was Sent:   KARINE CF PATIENTS & ASIA'S PATIENTS NEEDS EXTENDED PREP    11/14 Date of Procedure:   ava Surgeon:   [facility] Location:      Sedation Type:     Conscious Sedation- Needs  for 6 hours after the procedure  MAC/General-Needs  for 24 hours after procedure    N Pre-op Required at [facility], [place], [place] and OR for MAC sedation:        (Advise patient they will need a pre-op WITH IN 30 DAYS prior to procedure -)      Y Informed patient they will need an adult          Cannot take any type of public or medical transportation alone    Y Confirmed Nurse will call to complete assessment     HOME Pre-Procedure Covid test to be completed [[facility] PCR Testing Required]    DOUBLE CHECK BMI AND ANGELICA       Additional comments:

## 2022-11-11 NOTE — TELEPHONE ENCOUNTER
[facility] Call Center    Phone Message    May a detailed message be left on voicemail: yes     Reason for Call: Other: Pt Friend called in to inform of recent hospital visit and to send a message to ela Rosa about removing the pt from  [facility]. Please review hospital information as they also have some other recommendations about the Pt. [place] reach out to Pt to discuss further.     Action Taken: Other: Cardio    Travel Screening: Not Applicable     Thank you!  [facility]

## 2022-11-13 DIAGNOSIS — J44.89 FOLLICULAR BRONCHIOLITIS (H): ICD-10-CM

## 2022-11-14 ENCOUNTER — HOSPITAL ENCOUNTER (OUTPATIENT)
Facility: CLINIC | Age: 82
End: 2022-11-14
Attending: INTERNAL MEDICINE | Admitting: INTERNAL MEDICINE
Payer: MEDICARE

## 2022-11-14 ENCOUNTER — TELEPHONE (OUTPATIENT)
Dept: GASTROENTEROLOGY | Facility: CLINIC | Age: 82
End: 2022-11-14

## 2022-11-14 ENCOUNTER — MYC MEDICAL ADVICE (OUTPATIENT)
Dept: FAMILY MEDICINE | Facility: CLINIC | Age: 82
End: 2022-11-14

## 2022-11-14 ENCOUNTER — VIRTUAL VISIT (OUTPATIENT)
Dept: CARDIOLOGY | Facility: CLINIC | Age: 82
End: 2022-11-14
Attending: NURSE PRACTITIONER
Payer: MEDICARE

## 2022-11-14 DIAGNOSIS — Z95.818 PRESENCE OF WATCHMAN LEFT ATRIAL APPENDAGE CLOSURE DEVICE: Primary | ICD-10-CM

## 2022-11-14 DIAGNOSIS — Z95.818 PRESENCE OF WATCHMAN LEFT ATRIAL APPENDAGE CLOSURE DEVICE: ICD-10-CM

## 2022-11-14 DIAGNOSIS — E78.5 HYPERLIPIDEMIA LDL GOAL <100: ICD-10-CM

## 2022-11-14 DIAGNOSIS — I48.21 PERMANENT ATRIAL FIBRILLATION (H): Primary | ICD-10-CM

## 2022-11-14 DIAGNOSIS — Z79.4 TYPE 2 DIABETES MELLITUS WITH HYPERGLYCEMIA, WITH LONG-TERM CURRENT USE OF INSULIN (H): ICD-10-CM

## 2022-11-14 DIAGNOSIS — Z79.4 TYPE 2 DIABETES MELLITUS WITH HYPERGLYCEMIA, WITH LONG-TERM CURRENT USE OF INSULIN (H): Primary | ICD-10-CM

## 2022-11-14 DIAGNOSIS — E11.65 TYPE 2 DIABETES MELLITUS WITH HYPERGLYCEMIA, WITH LONG-TERM CURRENT USE OF INSULIN (H): Primary | ICD-10-CM

## 2022-11-14 DIAGNOSIS — E11.65 TYPE 2 DIABETES MELLITUS WITH HYPERGLYCEMIA, WITH LONG-TERM CURRENT USE OF INSULIN (H): ICD-10-CM

## 2022-11-14 DIAGNOSIS — I48.21 PERMANENT ATRIAL FIBRILLATION (H): ICD-10-CM

## 2022-11-14 DIAGNOSIS — R35.0 URINARY FREQUENCY: ICD-10-CM

## 2022-11-14 DIAGNOSIS — N18.32 STAGE 3B CHRONIC KIDNEY DISEASE (H): ICD-10-CM

## 2022-11-14 DIAGNOSIS — D64.9 ANEMIA, UNSPECIFIED TYPE: ICD-10-CM

## 2022-11-14 PROCEDURE — G0463 HOSPITAL OUTPT CLINIC VISIT: HCPCS | Mod: PN,RTG | Performed by: NURSE PRACTITIONER

## 2022-11-14 PROCEDURE — 99212 OFFICE O/P EST SF 10 MIN: CPT | Mod: 95 | Performed by: NURSE PRACTITIONER

## 2022-11-14 RX ORDER — CLOPIDOGREL BISULFATE 75 MG/1
75 TABLET ORAL DAILY
Qty: 90 TABLET | Refills: 1 | Status: SHIPPED | OUTPATIENT
Start: 2022-11-14 | End: 2023-03-31

## 2022-11-14 RX ORDER — AZITHROMYCIN 250 MG/1
TABLET, FILM COATED ORAL
Qty: 30 TABLET | Refills: 5 | Status: SHIPPED | OUTPATIENT
Start: 2022-11-14 | End: 2023-05-04

## 2022-11-14 NOTE — TELEPHONE ENCOUNTER
Spoke with Nghia, the Pt's caregiver. The Pt saw Cardiology today, is no longer on Xarelto. Last dose of Xarelto was on 11/10/22.   Was prescribed Plavix today however has not started it yet. So essentially is not on any anticoagulants to hold.     Is currently scheduled with CS. Takes Narcotic pain medication every other day. Should be MAC sedation. Last procedure was also MAC.   Will speak with scheduling in the morning about this. If we can switch to MAC, will see if we can use Cardiology visit as Pre-Op.     Suzanne Morocho RN   -[facility]

## 2022-11-14 NOTE — LETTER
11/14/2022      RE: Betty Tee  [address]  [facility] [address]       Dear Colleague,    Thank you for the opportunity to participate in the care of your patient, Betty Tee, at the [facility] [place] at [facility]. Please see a copy of my visit note below.      l    STRUCTURAL HEART CARE  CARDIOVASCULAR DIVISION    STRUCTURAL CLINIC RETURN VISIT    PRIMARY CARDIOLOGIST: Dr. Rosa      PERTINENT CLINICAL HISTORY:     Betty Tee is a very pleasant 82 year old female who presents for 45 day Watchman follow-up. 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 Right femoral venotomy closed with suture closure device. Her post procedure VERONICA showed well seated closure device without significant leak or evidence of pericardial effusion. She was discharged on Xarelto and ASA.     She was seen in the ER last week with anemia and rectal bleeding, Hgb had dropped from 11 to 8. She did not receive blood transfusion given she was hemodynamically stable. Urgent outpatient EGD recommended. She has since been holding Xarelto without major bleeding episodes. She otherwise has been doing alright. No chest pain, shortness of breath, orthopnea, PND, leg swelling, weight gain, palpitations, lightheadedness or syncope. Activity is mainly limited by severe right hip pain. She follows with the pain clinic. She is scheduled to see ortho later this week to discuss timing of hip replacement.     PAST MEDICAL HISTORY:     Past Medical History:   Diagnosis Date     Alcohol abuse, in remission      Allergic rhinitis, cause  unspecified     allegra helps when she takes it     Antiplatelet or antithrombotic long-term use      Atrial fibrillation (H)     in hosp in 11/11 after surgery w/ fluid overload     Cardiomegaly     LVH on stress echo- cardiac w/u at [facility] ER- neg CT scan for PE, neg stress echo in 8/06     Chest pain, unspecified      Congestive heart failure (H)      Depressive disorder      Diabetes (H)      Disorder of bone and cartilage, unspecified     osteopenia (had been on prempro), improved on 6/06 dexa, stable dexa 11/10     Diverticulosis of colon (without mention of hemorrhage)     last episode yrs ago     Essential hypertension, benign      Follicular bronchiolitis (H)     associated with Sjogrens, dx by chest CT showing mosaic attenuation and air trapping     Gastro-oesophageal reflux disease      ILD (interstitial lung disease) (H)     associated with Sjogrens, also has mildly elevated IgG4, first noted on chest CT 2015 (mild changes) and also has small airways disease; ILD improved on follow up chest CT 2018.     Insomnia, unspecified     weaned off clonazepam     Irregular heart beat      Lumbago 7/09    MRI with DJD, now seeing Dr. Cain for sciatic sx's     Major depressive disorder, recurrent episode, moderate (H)      Obstructive sleep apnea     uses cpap     Osteoarthrosis, unspecified whether generalized or localized, unspecified site      Sjogren's syndrome (H)     + RG and SSA and lip bx     Sleep apnea      Tobacco use disorder     chantix in 9/07, started again in 6/08, working        PAST SURGICAL HISTORY:     Past Surgical History:   Procedure Laterality Date     APPENDECTOMY       BACK SURGERY  1962     BACK SURGERY  1962     BIOPSY BREAST  09/27/2002    Biopsy Left Breast     BIOPSY BREAST Left 09/27/2002     BREAST SURGERY       CARDIAC SURGERY       CARDIAC SURGERY       CHOLECYSTECTOMY  1990's?     CHOLECYSTECTOMY       COLECTOMY LEFT  11/07/2011    Procedure:COLECTOMY LEFT; Laparoscopic  mobilization of splenic flexture, sigmoid colectomy, coloprotoscopy, loop illeostomy; Surgeon:CK SIDDIQI; Location:[facility] OR     COLECTOMY LEFT  11/07/2011     COLONOSCOPY  1990,s     COLONOSCOPY N/A 5/3/2022    Procedure: COLONOSCOPY;  Surgeon: Guru Winsome Dias MD;  Location: [facility] GI     CV LEFT ATRIAL APPENDAGE CLOSURE N/A 9/26/2022    Procedure: Left Atrial Appendage Closure;  Surgeon: Uzair Crews MD;  Location:  HEART CARDIAC CATH LAB     ENT SURGERY       EYE SURGERY  2012     FLEXIBLE SIGMOIDOSCOPY  11/03/2011     HYSTERECTOMY TOTAL ABDOMINAL, BILATERAL SALPINGO-OOPHORECTOMY, COMBINED  11/07/2011    Procedure:COMBINED HYSTERECTOMY TOTAL ABDOMINAL, BILATERAL SALPINGO-OOPHORECTOMY; total abdominal hysterectomy, bilateral salpingo-oophorectomy; Surgeon:ALETA MANUEL; Location:[facility] OR     HYSTERECTOMY TOTAL ABDOMINAL, BILATERAL SALPINGO-OOPHORECTOMY, COMBINED  11/07/2011     ILEOSTOMY  02/01/2012    takedown loop ileostomy      INSERT STENT URETER  11/07/2011    Procedure:INSERT STENT URETER; Placement of Bilateral Ureteral Stents ; Surgeon:PRANEETH BRYANT; Location:[facility] OR     SIGMOIDECTOMY  02/01/2012    Dr. Siddiqi, Sigmoidectomy for diverticular abscess, iliostomy afterwards until repair     SIGMOIDOSCOPY FLEXIBLE  11/03/2011    Procedure:SIGMOIDOSCOPY FLEXIBLE; Flexible Sigmoidoscopy; Surgeon:CK SIDDIQI; Location:[facility] OR     TAKEDOWN ILEOSTOMY  02/01/2012    Procedure:TAKEDOWN ILEOSTOMY; Takedown Loop Ileostomy ; Surgeon:CK SIDDIQI; Location:[facility] OR     URETERAL STENT PLACEMENT  11/07/2011     [facility] APPENDECTOMY  1970's?     [facility] NONSPECIFIC PROCEDURE  11/2005    exploratory abd lap, adhesions, resolved RLQ pain, diverticulitis episodes        CURRENT MEDICATIONS:     Current Outpatient Medications   Medication Sig Dispense Refill     ACCU-CHEK SHANNON PLUS test strip USE TO TEST BLOOD SUGARS 3 TIMES DAILY 300 strip 5     acetaminophen (TYLENOL) 500 MG tablet Take 1,000 mg by  mouth every 8 hours as needed (max 6 tablets/24 hours, 2 tablets/dose)       acyclovir (ZOVIRAX) 400 MG tablet Take 1 tablet (400 mg) by mouth 3 times daily as needed For a couple days 15 tablet 2     acyclovir (ZOVIRAX) 5 % external ointment Apply topically as needed (6 times day PRN for outbreaks) As needed for outbreaks 15 g 3     albuterol (PROAIR HFA/PROVENTIL HFA/VENTOLIN HFA) 108 (90 Base) MCG/ACT inhaler Inhale 2 puffs into the lungs every 6 hours 18 g 11     alendronate (FOSAMAX) 70 MG tablet Take 1 tablet (70 mg) by mouth every 7 days 12 tablet 0     anakinra (KINERET) 100 MG/0.67ML SOSY injection 100 mg every day x 3 days as needed for flare ups 6.7 mL 3     aspirin (ASA) 81 MG EC tablet Take 1 tablet (81 mg) by mouth daily 90 tablet 1     atorvastatin (LIPITOR) 10 MG tablet TAKE 1/2 TABLET BY MOUTH EVERY DAY 45 tablet 0     azithromycin (ZITHROMAX) 250 MG tablet TAKE 1 TABLET BY MOUTH EVERY DAY 30 tablet 5     BD PEN NEEDLE JANE 2ND GEN 32G X 4 MM miscellaneous USE 4 DAILY AS DIRECTED. 400 each 1     blood glucose (NO BRAND SPECIFIED) lancets standard Use to test blood sugar 3 times daily or as directed 100 lancet 3     cevimeline (EVOXAC) 30 MG capsule Take 1 capsule (30 mg) by mouth 3 times daily (Patient taking differently: Take 30 mg by mouth twice a week On Tuesdays and Fridays) 270 capsule 2     Cholecalciferol (VITAMIN D3) 50 MCG (2000 UT) CAPS TAKE 100 MCG BY MOUTH DAILY (TAKE 2 TABLET (50 MCG) BY MOUTH DAILY - ORAL) 90 capsule 1     clopidogrel (PLAVIX) 75 MG tablet Take 1 tablet (75 mg) by mouth daily 90 tablet 1     COMPOUNDED NON-CONTROLLED SUBSTANCE (CMPD RX) - PHARMACY TO MIX COMPOUNDED MEDICATION Estriol 1 mg/g Apply small amount to finger and apply to inside vagina daily for 2 weeks then twice weekly Route: vaginally Dispense 30 grams 11 refills (Patient not taking: No sig reported) 30 g 11     cyanocobalamin (VITAMIN B-12) 1000 MCG tablet Take 1 tablet (1,000 mcg) by mouth daily 30  tablet 1     escitalopram (LEXAPRO) 20 MG tablet Take 1 tablet (20 mg) by mouth daily 90 tablet 0     ferrous sulfate (FEROSUL) 325 (65 Fe) MG tablet TAKE 1 TABLET BY MOUTH EVERY DAY WITH BREAKFAST 90 tablet 0     fluticasone (FLONASE) 50 MCG/ACT nasal spray SPRAY 1-2 SPRAYS INTO BOTH NOSTRILS DAILY AS NEEDED FOR ALLERGIES 16 mL 11     fluticasone-vilanterol (BREO ELLIPTA) 100-25 MCG/INH inhaler Inhale 1 puff into the lungs daily 1 each 11     hydroxychloroquine (PLAQUENIL) 200 MG tablet Take 2 tablets (400 mg) by mouth daily Get annual eye exams for hydroxychloroquine (Plaquenil) monitoring and fax to [phone] 180 tablet 3     insulin glargine (BASAGLAR KWIKPEN) 100 UNIT/ML pen INJECT 22 UNITS SUBCUTANEOUS DAILY (TO REPLACE LANTUS) (Patient taking differently: INJECT 24 UNITS SUBCUTANEOUS DAILY (TO REPLACE LANTUS)) 15 mL 1     ketoconazole (NIZORAL) 2 % external cream Apply topically 2 times daily as needed for itching 60 g 1     KLOR-CON 20 MEQ CR tablet TAKE 2 TABLETS (40 MEQ) BY MOUTH EVERY MORNING AND 1 TABLET (20 MEQ) EVERY EVENING. 270 tablet 3     lidocaine (LIDODERM) 5 % patch APPLY PATCH TO PAINFUL AREA FOR UP TO 12 H WITHIN A 24 H PERIOD. REMOVE AFTER 12 HOURS. (Patient taking differently: Apply patch to painful area for up to 12 h within a 24 h period.  Remove after 12 hours.) 30 patch 2     loratadine (CLARITIN) 10 MG tablet TAKE 1 TABLET BY MOUTH EVERY DAY 90 tablet 3     methocarbamol (ROBAXIN) 750 MG tablet Take 1 tablet (750 mg) by mouth 3 times daily as needed for muscle spasms 90 tablet 3     metoprolol succinate ER (TOPROL XL) 50 MG 24 hr tablet Take 1 tablet (50 mg) by mouth 2 times daily 90 tablet 3     montelukast (SINGULAIR) 10 MG tablet TAKE 1 TABLET BY MOUTH EVERYDAY AT BEDTIME 90 tablet 0     NOVOLOG FLEXPEN 100 UNIT/ML soln INJECT 12 UNITS SUBCUTANEOUS 3 TIMES DAILY (WITH MEALS) 3 mL 1     oxyCODONE-acetaminophen (PERCOCET) 5-325 MG tablet Take 1 tablet by mouth 2 times daily as  needed for pain May fill and start on 9/21/2022. For chronic pain. 20 tablet 0     oxyCODONE-acetaminophen (PERCOCET) 7.5-325 MG per tablet Take 1 tablet by mouth 2 times daily as needed for severe pain With at least 4 hours between doses. OK to dispense and start 10/20/22 60 tablet 0     OZEMPIC, 1 MG/DOSE, 4 MG/3ML SOPN INJECT 1 MG SUBCUTANEOUS ONCE A WEEK 3 mL 2     pantoprazole (PROTONIX) 40 MG EC tablet TAKE 1 TABLET (40 MG) BY MOUTH DAILY (REPLACES FAMOTIDINE- SHOULD STOP TAKING FAMOTIDINE) 90 tablet 0     predniSONE (DELTASONE) 5 MG tablet Take 1 tablet (5 mg) by mouth daily 90 tablet 1     spironolactone (ALDACTONE) 25 MG tablet Take 2 tablets (50 mg) by mouth daily 180 tablet 3     torsemide (DEMADEX) 10 MG tablet TAKE ONE TAB (10 MG) WITH ONE 20 MG TAB TO = 30 MG DAILY IN AFTERNOON. 90 tablet 0     torsemide (DEMADEX) 20 MG tablet TAKE 2 TABLETS (40 MG) BY MOUTH EVERY MORNING AND 1 TABLET (20 MG) DAILY AT 2 PM. TAKE THE AFTERNOON DOSE WITH AN EXTRA 10 MG TAB FOR A TOTAL OF 30 MG IN THE AFTERNOON 270 tablet 3     traZODone (DESYREL) 50 MG tablet TAKE 3 TABLETS (150 MG) BY MOUTH NIGHTLY AS NEEDED FOR SLEEP 270 tablet 0        ALLERGIES:     Allergies   Allergen Reactions     Amoxicillin-Pot Clavulanate      Augmentin Nausea and Vomiting     Codeine Nausea and Vomiting     PN: LW Reaction: HIVES     Penicillins Nausea and Vomiting     PN: LW Reaction: GI Upset     Phenobarbital Itching     Seasonal Allergies         FAMILY HISTORY:     Family History   Problem Relation Age of Onset     C.A.D. Mother 63        MI- first at age 63     Heart Disease Mother      Hypertension Mother      Cerebrovascular Disease Mother      Hyperlipidemia Mother      Coronary Artery Disease Mother         MI-first at age 63     Other - See Comments Mother         cerebrovascular disease      Alcohol/Drug Father      Alzheimer Disease Father      Dementia Father      Hypertension Father      Hyperlipidemia Father      Substance  Abuse Father      Diabetes Sister      Hypertension Sister      Hyperlipidemia Sister      Substance Abuse Sister      Asthma Sister      C.A.D. Sister 52        Minor MI- age 50's     Heart Disease Sister      Hypertension Sister      Hypertension Sister      Cancer - colorectal Sister 48        Late 40's early 50's     Gastrointestinal Disease Sister         Diverticulitis     Lipids Sister      Lipids Sister      Diabetes Sister      Heart Disease Sister         CHF     Cancer Sister         lung, smoker     Substance Abuse Sister      Asthma Sister      Cancer Sister      Coronary Artery Disease Sister         minor MI-age 50's     Heart Disease Sister      Hypertension Sister      Hypertension Sister      Colon Cancer Sister      Diverticulitis Sister      Lung Cancer Sister      Heart Disease Sister         CHF     Diabetes Sister      Asthma Sister      Hypertension Brother      Prostate Cancer Brother 74        Dx'd age 74     Gastrointestinal Disease Brother         Diverticulitis     Parkinsonism Brother      Substance Abuse Brother      Prostate Cancer Brother      Hyperlipidemia Brother      Hypertension Brother      Prostate Cancer Brother      Diverticulitis Brother      Parkinsonism Brother      Breast Cancer Daughter      Breast Cancer Daughter      Diabetes Other      Hypertension Other      Anesthesia Reaction No family hx of      Deep Vein Thrombosis (DVT) No family hx of         SOCIAL HISTORY:     Social History     Socioeconomic History     Marital status: Single     Number of children: 0     Years of education: Ed Spec De   Occupational History     Occupation: Professor     Employer: [employer]     Comment: [employer]- Education   Tobacco Use     Smoking status: Former Smoker     Packs/day: 0.50     Years: 10.00     Pack years: 5.00     Types: Cigarettes     Quit date: 2011     Years since quittin.1     Smokeless tobacco: Never Used     Tobacco  comment: 1/2 ppd   Substance and Sexual Activity     Alcohol use: No     Alcohol/week: 0.0 standard drinks     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     Drug use: No     Sexual activity: Never   Other Topics Concern     Parent/sibling w/ CABG, MI or angioplasty before 65F 55M? Yes   Social History Narrative                    REVIEW OF SYSTEMS:     Constitutional: No fevers or chills  Skin: No new rash or itching  Eyes: No acute change in vision  Ears/Nose/Throat: No purulent rhinorrhea, new hearing loss, or new vertigo  Respiratory: No cough or hemoptysis  Cardiovascular: See HPI  Gastrointestinal: No change in appetite, vomiting, hematemesis or diarrhea  Genitourinary: No dysuria or hematuria  Musculoskeletal: No new back pain, neck pain or muscle pain  Neurologic: No new headaches, focal weakness or behavior changes  Psychiatric: No hallucinations, excessive alcohol consumption or illegal drug usage  Hematologic/Lymphatic/Immunologic: No bleeding, chills, fever, night sweats or weight loss  Endocrine: No new cold intolerance, heat intolerance, polyphagia, polydipsia or polyuria      PHYSICAL EXAMINATION:     There were no vitals taken for this visit.    GENERAL: No acute distress.  HEENT: EOMI. Sclerae white, not injected. Nares clear. Pharynx without erythema or exudate.   Neck:. No jugular venous distension.   Lungs: Non labored breathing   Extremities: No clubbing, cyanosis, or edema.   Neurologic: Alert and oriented to person/place/time, normal speech and affect. No focal deficits.  Skin: No petechiae, purpura or rash.     LABORATORY DATA:     LIPID RESULTS:  Lab Results   Component Value Date    CHOL 104 12/09/2021    CHOL 115 10/20/2020    HDL 59 12/09/2021    HDL 71 10/20/2020    LDL 16 12/09/2021    LDL 19 10/20/2020    TRIG 145 12/09/2021    TRIG 125 10/20/2020    CHOLHDLRATIO 2.5 08/06/2014       LIVER ENZYME RESULTS:  Lab Results   Component Value Date    AST 22 11/10/2022    AST 17 06/04/2021    ALT  12 11/10/2022    ALT 25 06/04/2021       CBC RESULTS:  Lab Results   Component Value Date    WBC 7.9 11/10/2022    WBC 8.6 06/04/2021    RBC 2.66 (L) 11/10/2022    RBC 4.46 06/04/2021    HGB 8.1 (L) 11/10/2022    HGB 13.4 06/04/2021    HCT 27.3 (L) 11/10/2022    HCT 42.1 06/04/2021     (H) 11/10/2022    MCV 94 06/04/2021    MCH 30.5 11/10/2022    MCH 30.0 06/04/2021    MCHC 29.7 (L) 11/10/2022    MCHC 31.8 06/04/2021    RDW 15.2 (H) 11/10/2022    RDW 15.5 (H) 06/04/2021     11/10/2022     06/04/2021       BMP RESULTS:  Lab Results   Component Value Date     11/10/2022     06/04/2021    POTASSIUM 4.3 11/10/2022    POTASSIUM 4.1 08/23/2022    POTASSIUM 4.0 06/04/2021    CHLORIDE 104 11/10/2022    CHLORIDE 107 08/23/2022    CHLORIDE 106 06/04/2021    CO2 23 11/10/2022    CO2 22 08/23/2022    CO2 25 06/04/2021    ANIONGAP 13 11/10/2022    ANIONGAP 8 08/23/2022    ANIONGAP 6 06/04/2021     (H) 11/10/2022     (H) 09/26/2022     (H) 08/23/2022     (H) 06/04/2021    BUN 18.5 11/10/2022    BUN 17 08/23/2022    BUN 14 06/04/2021    CR 1.19 (H) 11/10/2022    CR 1.11 (H) 06/04/2021    GFRESTIMATED 45 (L) 11/10/2022    GFRESTIMATED 47 (L) 06/04/2021    GFRESTBLACK 54 (L) 06/04/2021    CLAUDY 9.0 11/10/2022    CLAUDY 8.8 06/04/2021        A1C RESULTS:  Lab Results   Component Value Date    A1C 6.4 (H) 08/23/2022    A1C 7.1 (H) 06/04/2021       INR RESULTS:  Lab Results   Component Value Date    INR 1.43 (H) 11/10/2022    INR 1.37 (H) 09/23/2022    INR 1.36 (H) 03/03/2020    INR 2.37 (H) 02/14/2019          PROCEDURES & FURTHER ASSESSMENTS:     CT heart 11/10/22:    IMPRESSION:  1.  There is a Watchman 24 mm closure device in the left atrial  appendage. It is well-seated without evidence of thrombus on the left  atrial side of the device.  2.  There is no contrast opacification of the left atrial appendage.  3.  Please review the separate Radiology report from the  original  study site and date for incidental noncardiac findings. This report  will follow separately.     FINDINGS:   1.  There is a Watchman 24 mm closure device in the left atrial  appendage. It is well-seated.   2.  There is no thrombus on the left atrial side of the closure device  on early or delayed imaging.   3.  There is no contrast opacification of the left atrial appendage,  implying complete endothelialization of the occlusion device.   4.  Normal pulmonary venous anatomy with all pulmonary veins draining  into the left atrium.  5.  Coronary images are non-diagnostic for stenosis/atherosclerosis  due to cardiac motion artifact.   6.  The visualized aortic root, ascending aorta and descending  thoracic aorta are normal.  7.    Please review the separate Radiology report from the original study  site and date for incidental noncardiac findings. This report will  follow separately.     CARLTON LANCE MD           CLINICAL IMPRESSION:     Betty Tee is a very pleasant 82 year old female who presents for 45 day Watchman follow-up.    1. Paroxysmal a-fib with intolerance to AC  2. 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 Post-procedure VERONICA showed no evidence of benigno-device leak and no pericardial effusion. Has since developed recurrent anemia, likely due to GI bleeding, scheduled for urgent outpatient EGD. CT today shows well seated LAAO without leak or thrombus, no contrast opacification in the ZACHARY suggesting complete endothelialization.  - Stop Xarelto given favorable CT results above   - Continue ASA 81 mg daily lifelong   - Start Plavix x 4.5 months - okay to hold around EGD and hip replacement, resume when safe from bleeding standpoint  - Repeat CBC in 2 weeks with PCP, go to ER if increased bleeding, dizziness, fatigue, syncope   - Continue SBE prophylaxis 6 months  - Return to clinic 6 months post watchman      3. Chronic  diastolic heart failure:  4. HTN  5. HLD  6. ANGELICA  Currently appears euvolemic.  - Continue torsemide 40 mg a.m./30 mg p.m and spironolactone 50 mg daily.  - Continue atorvastatin for cholesterol management  - Continue CPAP     7. Type II DM:   - Insulin dependent with novolog and lantus. Also on Ozempic. HgbA1C prior to PCP appt in 2 weeks. Continue current regimen.      8. Severe right hip pain 2/2 OA: Continue current pain regimen per Dr. Alba. Follow-up with orthopedist Dr. Shannon regarding timing of right hip replacement. Okay to proceed from a cardiac standpoint, continue ASA periop, okay to hold Plavix if needed.      9. PMR: Continue daily prednisone   10. Sjogrens: Continue Cevimeline for dry mouth  11. Gout: PRN anakinra for gout     RTC: Labs prior to PCP appt in 2 weeks, RTC structural clinic 6 months post watchman       Marina LIZY Soler, CNP  [facility]       CC  Patient Care Team:  Ilene Tristan MD as PCP - General  Kirill Polk MD as MD (Otolaryngology)  Aubrey Hurtado MD as MD (Cardiology)  Anderson Perez MD as MD (Clinical Cardiac Electrophysiology)  Kiesha Elias APRN CNP as Nurse Practitioner (Cardiology)  Beatriz Blanco, RN as Nurse Coordinator (Cardiology)  Carmenza Stringer MD as MD (Rheumatology)  Danay Payne, RN as Specialty Care Coordinator (Cardiology)  Sabrina Meek [facility] as Pharmacist (Pharmacist)  Flor Velasquez, RN as Specialty Care Coordinator (Cardiology)  Zulma Lopez MD as MD (Rheumatology)  Kami Lang MD as MD (Ophthalmology)  Reina Betancur MD as MD (Internal Medicine)  Zulma Lopez MD as Assigned Rheumatology Provider  Maryjane Tillman MD as Assigned Pulmonology Provider  Kelechi Santana MD as Assigned Surgical Provider  Radha Rosa MD as Assigned Heart and Vascular Provider  Thomas Shannon MD as Assigned Musculoskeletal Provider  Sabrina Meek [facility] as  Assigned MTM Pharmacist  Guru Winsome Dias MD as Assigned Gastroenterology Provider

## 2022-11-14 NOTE — PROGRESS NOTES
Betty is a 82 year old who is being evaluated via a billable video visit.      Pt states in [place] for visit.     How would you like to obtain your AVS? MyChart  If the video visit is dropped, the invitation should be resent by: Send to e-mail at: [email or web address]  Will anyone else be joining your video visit? pts friend Nghia ([email or web address])  SOPHIA Christiansen/TOM        Video-Visit Details    Type of service:  Video Visit    Video Start Time: 11:14    Video End Time: 11:28 AM    Originating Location (pt. Location): Home        Distant Location (provider location):  On-site    Platform used for Video Visit: [facility]  CARDIOVASCULAR DIVISION    STRUCTURAL CLINIC RETURN VISIT    PRIMARY CARDIOLOGIST: Dr. Rosa      PERTINENT CLINICAL HISTORY:     Betty Tee is a very pleasant 82 year old female who presents for 45 day Watchman follow-up. 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 Right femoral venotomy closed with suture closure device. Her post procedure VERONICA showed well seated closure device without significant leak or evidence of pericardial effusion. She was discharged on Xarelto and ASA.     She was seen in the ER last week with anemia and rectal bleeding, Hgb had dropped from 11 to 8. She did not receive blood transfusion given she was hemodynamically stable. Urgent outpatient EGD recommended. She has since been holding Xarelto without major bleeding episodes. She otherwise has been doing alright. No chest pain, shortness of breath, orthopnea, PND, leg swelling, weight gain, palpitations, lightheadedness or syncope. Activity is mainly limited  by severe right hip pain. She follows with the pain clinic. She is scheduled to see ortho later this week to discuss timing of hip replacement.     PAST MEDICAL HISTORY:     Past Medical History:   Diagnosis Date     Alcohol abuse, in remission      Allergic rhinitis, cause unspecified     allegra helps when she takes it     Antiplatelet or antithrombotic long-term use      Atrial fibrillation (H)     in hosp in 11/11 after surgery w/ fluid overload     Cardiomegaly     LVH on stress echo- cardiac w/u at [facility] ER- neg CT scan for PE, neg stress echo in 8/06     Chest pain, unspecified      Congestive heart failure (H)      Depressive disorder      Diabetes (H)      Disorder of bone and cartilage, unspecified     osteopenia (had been on prempro), improved on 6/06 dexa, stable dexa 11/10     Diverticulosis of colon (without mention of hemorrhage)     last episode yrs ago     Essential hypertension, benign      Follicular bronchiolitis (H)     associated with Sjogrens, dx by chest CT showing mosaic attenuation and air trapping     Gastro-oesophageal reflux disease      ILD (interstitial lung disease) (H)     associated with Sjogrens, also has mildly elevated IgG4, first noted on chest CT 2015 (mild changes) and also has small airways disease; ILD improved on follow up chest CT 2018.     Insomnia, unspecified     weaned off clonazepam     Irregular heart beat      Lumbago 7/09    MRI with DJD, now seeing Dr. Cain for sciatic sx's     Major depressive disorder, recurrent episode, moderate (H)      Obstructive sleep apnea     uses cpap     Osteoarthrosis, unspecified whether generalized or localized, unspecified site      Sjogren's syndrome (H)     + RG and SSA and lip bx     Sleep apnea      Tobacco use disorder     chantix in 9/07, started again in 6/08, working        PAST SURGICAL HISTORY:     Past Surgical History:   Procedure Laterality Date     APPENDECTOMY       BACK SURGERY  1962     BACK SURGERY  1962      BIOPSY BREAST  09/27/2002    Biopsy Left Breast     BIOPSY BREAST Left 09/27/2002     BREAST SURGERY       CARDIAC SURGERY       CARDIAC SURGERY       CHOLECYSTECTOMY  1990's?     CHOLECYSTECTOMY       COLECTOMY LEFT  11/07/2011    Procedure:COLECTOMY LEFT; Laparoscopic mobilization of splenic flexture, sigmoid colectomy, coloprotoscopy, loop illeostomy; Surgeon:CK SIDDIQI; Location:[facility] OR     COLECTOMY LEFT  11/07/2011     COLONOSCOPY  1990,s     COLONOSCOPY N/A 5/3/2022    Procedure: COLONOSCOPY;  Surgeon: Guru Winsome Dias MD;  Location: [facility] GI     CV LEFT ATRIAL APPENDAGE CLOSURE N/A 9/26/2022    Procedure: Left Atrial Appendage Closure;  Surgeon: Uzair Crews MD;  Location:  HEART CARDIAC CATH LAB     ENT SURGERY       EYE SURGERY  2012     FLEXIBLE SIGMOIDOSCOPY  11/03/2011     HYSTERECTOMY TOTAL ABDOMINAL, BILATERAL SALPINGO-OOPHORECTOMY, COMBINED  11/07/2011    Procedure:COMBINED HYSTERECTOMY TOTAL ABDOMINAL, BILATERAL SALPINGO-OOPHORECTOMY; total abdominal hysterectomy, bilateral salpingo-oophorectomy; Surgeon:AELTA MANUEL; Location:[facility] OR     HYSTERECTOMY TOTAL ABDOMINAL, BILATERAL SALPINGO-OOPHORECTOMY, COMBINED  11/07/2011     ILEOSTOMY  02/01/2012    takedown loop ileostomy      INSERT STENT URETER  11/07/2011    Procedure:INSERT STENT URETER; Placement of Bilateral Ureteral Stents ; Surgeon:PRANEETH BRYANT; Location:[facility] OR     SIGMOIDECTOMY  02/01/2012    Dr. Siddiqi, Sigmoidectomy for diverticular abscess, iliostomy afterwards until repair     SIGMOIDOSCOPY FLEXIBLE  11/03/2011    Procedure:SIGMOIDOSCOPY FLEXIBLE; Flexible Sigmoidoscopy; Surgeon:CK SIDDIQI; Location:[facility] OR     TAKEDOWN ILEOSTOMY  02/01/2012    Procedure:TAKEDOWN ILEOSTOMY; Takedown Loop Ileostomy ; Surgeon:CK SIDDIQI; Location:[facility] OR     URETERAL STENT PLACEMENT  11/07/2011     ZZC APPENDECTOMY  1970's?     ZZC NONSPECIFIC PROCEDURE  11/2005    exploratory abd lap, adhesions, resolved RLQ  pain, diverticulitis episodes        CURRENT MEDICATIONS:     Current Outpatient Medications   Medication Sig Dispense Refill     ACCU-CHEK SHANNON PLUS test strip USE TO TEST BLOOD SUGARS 3 TIMES DAILY 300 strip 5     acetaminophen (TYLENOL) 500 MG tablet Take 1,000 mg by mouth every 8 hours as needed (max 6 tablets/24 hours, 2 tablets/dose)       acyclovir (ZOVIRAX) 400 MG tablet Take 1 tablet (400 mg) by mouth 3 times daily as needed For a couple days 15 tablet 2     acyclovir (ZOVIRAX) 5 % external ointment Apply topically as needed (6 times day PRN for outbreaks) As needed for outbreaks 15 g 3     albuterol (PROAIR HFA/PROVENTIL HFA/VENTOLIN HFA) 108 (90 Base) MCG/ACT inhaler Inhale 2 puffs into the lungs every 6 hours 18 g 11     alendronate (FOSAMAX) 70 MG tablet Take 1 tablet (70 mg) by mouth every 7 days 12 tablet 0     anakinra (KINERET) 100 MG/0.67ML SOSY injection 100 mg every day x 3 days as needed for flare ups 6.7 mL 3     aspirin (ASA) 81 MG EC tablet Take 1 tablet (81 mg) by mouth daily 90 tablet 1     atorvastatin (LIPITOR) 10 MG tablet TAKE 1/2 TABLET BY MOUTH EVERY DAY 45 tablet 0     azithromycin (ZITHROMAX) 250 MG tablet TAKE 1 TABLET BY MOUTH EVERY DAY 30 tablet 5     BD PEN NEEDLE JANE 2ND GEN 32G X 4 MM miscellaneous USE 4 DAILY AS DIRECTED. 400 each 1     blood glucose (NO BRAND SPECIFIED) lancets standard Use to test blood sugar 3 times daily or as directed 100 lancet 3     cevimeline (EVOXAC) 30 MG capsule Take 1 capsule (30 mg) by mouth 3 times daily (Patient taking differently: Take 30 mg by mouth twice a week On Tuesdays and Fridays) 270 capsule 2     Cholecalciferol (VITAMIN D3) 50 MCG (2000 UT) CAPS TAKE 100 MCG BY MOUTH DAILY (TAKE 2 TABLET (50 MCG) BY MOUTH DAILY - ORAL) 90 capsule 1     clopidogrel (PLAVIX) 75 MG tablet Take 1 tablet (75 mg) by mouth daily 90 tablet 1     COMPOUNDED NON-CONTROLLED SUBSTANCE (CMPD RX) - PHARMACY TO MIX COMPOUNDED MEDICATION Estriol 1 mg/g Apply small  amount to finger and apply to inside vagina daily for 2 weeks then twice weekly Route: vaginally Dispense 30 grams 11 refills (Patient not taking: No sig reported) 30 g 11     cyanocobalamin (VITAMIN B-12) 1000 MCG tablet Take 1 tablet (1,000 mcg) by mouth daily 30 tablet 1     escitalopram (LEXAPRO) 20 MG tablet Take 1 tablet (20 mg) by mouth daily 90 tablet 0     ferrous sulfate (FEROSUL) 325 (65 Fe) MG tablet TAKE 1 TABLET BY MOUTH EVERY DAY WITH BREAKFAST 90 tablet 0     fluticasone (FLONASE) 50 MCG/ACT nasal spray SPRAY 1-2 SPRAYS INTO BOTH NOSTRILS DAILY AS NEEDED FOR ALLERGIES 16 mL 11     fluticasone-vilanterol (BREO ELLIPTA) 100-25 MCG/INH inhaler Inhale 1 puff into the lungs daily 1 each 11     hydroxychloroquine (PLAQUENIL) 200 MG tablet Take 2 tablets (400 mg) by mouth daily Get annual eye exams for hydroxychloroquine (Plaquenil) monitoring and fax to [phone] 180 tablet 3     insulin glargine (BASAGLAR KWIKPEN) 100 UNIT/ML pen INJECT 22 UNITS SUBCUTANEOUS DAILY (TO REPLACE LANTUS) (Patient taking differently: INJECT 24 UNITS SUBCUTANEOUS DAILY (TO REPLACE LANTUS)) 15 mL 1     ketoconazole (NIZORAL) 2 % external cream Apply topically 2 times daily as needed for itching 60 g 1     KLOR-CON 20 MEQ CR tablet TAKE 2 TABLETS (40 MEQ) BY MOUTH EVERY MORNING AND 1 TABLET (20 MEQ) EVERY EVENING. 270 tablet 3     lidocaine (LIDODERM) 5 % patch APPLY PATCH TO PAINFUL AREA FOR UP TO 12 H WITHIN A 24 H PERIOD. REMOVE AFTER 12 HOURS. (Patient taking differently: Apply patch to painful area for up to 12 h within a 24 h period.  Remove after 12 hours.) 30 patch 2     loratadine (CLARITIN) 10 MG tablet TAKE 1 TABLET BY MOUTH EVERY DAY 90 tablet 3     methocarbamol (ROBAXIN) 750 MG tablet Take 1 tablet (750 mg) by mouth 3 times daily as needed for muscle spasms 90 tablet 3     metoprolol succinate ER (TOPROL XL) 50 MG 24 hr tablet Take 1 tablet (50 mg) by mouth 2 times daily 90 tablet 3     montelukast (SINGULAIR) 10  MG tablet TAKE 1 TABLET BY MOUTH EVERYDAY AT BEDTIME 90 tablet 0     NOVOLOG FLEXPEN 100 UNIT/ML soln INJECT 12 UNITS SUBCUTANEOUS 3 TIMES DAILY (WITH MEALS) 3 mL 1     oxyCODONE-acetaminophen (PERCOCET) 5-325 MG tablet Take 1 tablet by mouth 2 times daily as needed for pain May fill and start on 9/21/2022. For chronic pain. 20 tablet 0     oxyCODONE-acetaminophen (PERCOCET) 7.5-325 MG per tablet Take 1 tablet by mouth 2 times daily as needed for severe pain With at least 4 hours between doses. OK to dispense and start 10/20/22 60 tablet 0     OZEMPIC, 1 MG/DOSE, 4 MG/3ML SOPN INJECT 1 MG SUBCUTANEOUS ONCE A WEEK 3 mL 2     pantoprazole (PROTONIX) 40 MG EC tablet TAKE 1 TABLET (40 MG) BY MOUTH DAILY (REPLACES FAMOTIDINE- SHOULD STOP TAKING FAMOTIDINE) 90 tablet 0     predniSONE (DELTASONE) 5 MG tablet Take 1 tablet (5 mg) by mouth daily 90 tablet 1     spironolactone (ALDACTONE) 25 MG tablet Take 2 tablets (50 mg) by mouth daily 180 tablet 3     torsemide (DEMADEX) 10 MG tablet TAKE ONE TAB (10 MG) WITH ONE 20 MG TAB TO = 30 MG DAILY IN AFTERNOON. 90 tablet 0     torsemide (DEMADEX) 20 MG tablet TAKE 2 TABLETS (40 MG) BY MOUTH EVERY MORNING AND 1 TABLET (20 MG) DAILY AT 2 PM. TAKE THE AFTERNOON DOSE WITH AN EXTRA 10 MG TAB FOR A TOTAL OF 30 MG IN THE AFTERNOON 270 tablet 3     traZODone (DESYREL) 50 MG tablet TAKE 3 TABLETS (150 MG) BY MOUTH NIGHTLY AS NEEDED FOR SLEEP 270 tablet 0        ALLERGIES:     Allergies   Allergen Reactions     Amoxicillin-Pot Clavulanate      Augmentin Nausea and Vomiting     Codeine Nausea and Vomiting     PN: LW Reaction: HIVES     Penicillins Nausea and Vomiting     PN: LW Reaction: GI Upset     Phenobarbital Itching     Seasonal Allergies         FAMILY HISTORY:     Family History   Problem Relation Age of Onset     C.A.D. Mother 63        MI- first at age 63     Heart Disease Mother      Hypertension Mother      Cerebrovascular Disease Mother      Hyperlipidemia Mother      Coronary  Artery Disease Mother         MI-first at age 63     Other - See Comments Mother         cerebrovascular disease      Alcohol/Drug Father      Alzheimer Disease Father      Dementia Father      Hypertension Father      Hyperlipidemia Father      Substance Abuse Father      Diabetes Sister      Hypertension Sister      Hyperlipidemia Sister      Substance Abuse Sister      Asthma Sister      C.A.D. Sister 52        Minor MI- age 50's     Heart Disease Sister      Hypertension Sister      Hypertension Sister      Cancer - colorectal Sister 48        Late 40's early 50's     Gastrointestinal Disease Sister         Diverticulitis     Lipids Sister      Lipids Sister      Diabetes Sister      Heart Disease Sister         CHF     Cancer Sister         lung, smoker     Substance Abuse Sister      Asthma Sister      Cancer Sister      Coronary Artery Disease Sister         minor MI-age 50's     Heart Disease Sister      Hypertension Sister      Hypertension Sister      Colon Cancer Sister      Diverticulitis Sister      Lung Cancer Sister      Heart Disease Sister         CHF     Diabetes Sister      Asthma Sister      Hypertension Brother      Prostate Cancer Brother 74        Dx'd age 74     Gastrointestinal Disease Brother         Diverticulitis     Parkinsonism Brother      Substance Abuse Brother      Prostate Cancer Brother      Hyperlipidemia Brother      Hypertension Brother      Prostate Cancer Brother      Diverticulitis Brother      Parkinsonism Brother      Breast Cancer Daughter      Breast Cancer Daughter      Diabetes Other      Hypertension Other      Anesthesia Reaction No family hx of      Deep Vein Thrombosis (DVT) No family hx of         SOCIAL HISTORY:     Social History     Socioeconomic History     Marital status: Single     Number of children: 0     Years of education: Ed Spec De   Occupational History     Occupation: Professor     Employer: [employer]     Comment: [employer]   Tobacco Use     Smoking status: Former Smoker     Packs/day: 0.50     Years: 10.00     Pack years: 5.00     Types: Cigarettes     Quit date: 2011     Years since quittin.1     Smokeless tobacco: Never Used     Tobacco comment:  ppd   Substance and Sexual Activity     Alcohol use: No     Alcohol/week: 0.0 standard drinks     Comment: In recovery beginning      Drug use: No     Sexual activity: Never   Other Topics Concern     Parent/sibling w/ CABG, MI or angioplasty before 65F 55M? Yes   Social History Narrative                    REVIEW OF SYSTEMS:     Constitutional: No fevers or chills  Skin: No new rash or itching  Eyes: No acute change in vision  Ears/Nose/Throat: No purulent rhinorrhea, new hearing loss, or new vertigo  Respiratory: No cough or hemoptysis  Cardiovascular: See HPI  Gastrointestinal: No change in appetite, vomiting, hematemesis or diarrhea  Genitourinary: No dysuria or hematuria  Musculoskeletal: No new back pain, neck pain or muscle pain  Neurologic: No new headaches, focal weakness or behavior changes  Psychiatric: No hallucinations, excessive alcohol consumption or illegal drug usage  Hematologic/Lymphatic/Immunologic: No bleeding, chills, fever, night sweats or weight loss  Endocrine: No new cold intolerance, heat intolerance, polyphagia, polydipsia or polyuria      PHYSICAL EXAMINATION:     There were no vitals taken for this visit.    GENERAL: No acute distress.  HEENT: EOMI. Sclerae white, not injected. Nares clear. Pharynx without erythema or exudate.   Neck:. No jugular venous distension.   Lungs: Non labored breathing   Extremities: No clubbing, cyanosis, or edema.   Neurologic: Alert and oriented to person/place/time, normal speech and affect. No focal deficits.  Skin: No petechiae, purpura or rash.     LABORATORY DATA:     LIPID RESULTS:  Lab Results   Component Value Date    CHOL 104 2021    CHOL 115 10/20/2020    HDL 59 2021     HDL 71 10/20/2020    LDL 16 12/09/2021    LDL 19 10/20/2020    TRIG 145 12/09/2021    TRIG 125 10/20/2020    CHOLHDLRATIO 2.5 08/06/2014       LIVER ENZYME RESULTS:  Lab Results   Component Value Date    AST 22 11/10/2022    AST 17 06/04/2021    ALT 12 11/10/2022    ALT 25 06/04/2021       CBC RESULTS:  Lab Results   Component Value Date    WBC 7.9 11/10/2022    WBC 8.6 06/04/2021    RBC 2.66 (L) 11/10/2022    RBC 4.46 06/04/2021    HGB 8.1 (L) 11/10/2022    HGB 13.4 06/04/2021    HCT 27.3 (L) 11/10/2022    HCT 42.1 06/04/2021     (H) 11/10/2022    MCV 94 06/04/2021    MCH 30.5 11/10/2022    MCH 30.0 06/04/2021    MCHC 29.7 (L) 11/10/2022    MCHC 31.8 06/04/2021    RDW 15.2 (H) 11/10/2022    RDW 15.5 (H) 06/04/2021     11/10/2022     06/04/2021       BMP RESULTS:  Lab Results   Component Value Date     11/10/2022     06/04/2021    POTASSIUM 4.3 11/10/2022    POTASSIUM 4.1 08/23/2022    POTASSIUM 4.0 06/04/2021    CHLORIDE 104 11/10/2022    CHLORIDE 107 08/23/2022    CHLORIDE 106 06/04/2021    CO2 23 11/10/2022    CO2 22 08/23/2022    CO2 25 06/04/2021    ANIONGAP 13 11/10/2022    ANIONGAP 8 08/23/2022    ANIONGAP 6 06/04/2021     (H) 11/10/2022     (H) 09/26/2022     (H) 08/23/2022     (H) 06/04/2021    BUN 18.5 11/10/2022    BUN 17 08/23/2022    BUN 14 06/04/2021    CR 1.19 (H) 11/10/2022    CR 1.11 (H) 06/04/2021    GFRESTIMATED 45 (L) 11/10/2022    GFRESTIMATED 47 (L) 06/04/2021    GFRESTBLACK 54 (L) 06/04/2021    CLAUDY 9.0 11/10/2022    CLAUDY 8.8 06/04/2021        A1C RESULTS:  Lab Results   Component Value Date    A1C 6.4 (H) 08/23/2022    A1C 7.1 (H) 06/04/2021       INR RESULTS:  Lab Results   Component Value Date    INR 1.43 (H) 11/10/2022    INR 1.37 (H) 09/23/2022    INR 1.36 (H) 03/03/2020    INR 2.37 (H) 02/14/2019          PROCEDURES & FURTHER ASSESSMENTS:     CT heart 11/10/22:    IMPRESSION:  1.  There is a Watchman 24 mm closure device in the  left atrial  appendage. It is well-seated without evidence of thrombus on the left  atrial side of the device.  2.  There is no contrast opacification of the left atrial appendage.  3.  Please review the separate Radiology report from the original  study site and date for incidental noncardiac findings. This report  will follow separately.     FINDINGS:   1.  There is a Watchman 24 mm closure device in the left atrial  appendage. It is well-seated.   2.  There is no thrombus on the left atrial side of the closure device  on early or delayed imaging.   3.  There is no contrast opacification of the left atrial appendage,  implying complete endothelialization of the occlusion device.   4.  Normal pulmonary venous anatomy with all pulmonary veins draining  into the left atrium.  5.  Coronary images are non-diagnostic for stenosis/atherosclerosis  due to cardiac motion artifact.   6.  The visualized aortic root, ascending aorta and descending  thoracic aorta are normal.  7.    Please review the separate Radiology report from the original study  site and date for incidental noncardiac findings. This report will  follow separately.     CARLTON LANCE MD           CLINICAL IMPRESSION:     Betty Tee is a very pleasant 82 year old female who presents for 45 day Watchman follow-up.    1. Paroxysmal a-fib with intolerance to AC  2. 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 Post-procedure VERONICA showed no evidence of benigno-device leak and no pericardial effusion. Has since developed recurrent anemia, likely due to GI bleeding, scheduled for urgent outpatient EGD. CT today shows well seated LAAO without leak or thrombus, no contrast opacification in the ZACHARY suggesting complete endothelialization.  - Stop Xarelto given favorable CT results above   - Continue ASA 81 mg daily lifelong   - Start Plavix x 4.5 months - okay to hold around EGD and hip replacement,  resume when safe from bleeding standpoint  - Repeat CBC in 2 weeks with PCP, go to ER if increased bleeding, dizziness, fatigue, syncope   - Continue SBE prophylaxis 6 months  - Return to clinic 6 months post watchman      3. Chronic diastolic heart failure:  4. HTN  5. HLD  6. ANGELICA  Currently appears euvolemic.  - Continue torsemide 40 mg a.m./30 mg p.m and spironolactone 50 mg daily.  - Continue atorvastatin for cholesterol management  - Continue CPAP     7. Type II DM:   - Insulin dependent with novolog and lantus. Also on Ozempic. HgbA1C prior to PCP appt in 2 weeks. Continue current regimen.      8. Severe right hip pain 2/2 OA: Continue current pain regimen per Dr. Alba. Follow-up with orthopedist Dr. Shannon regarding timing of right hip replacement. Okay to proceed from a cardiac standpoint, continue ASA periop, okay to hold Plavix if needed.      9. PMR: Continue daily prednisone   10. Sjogrens: Continue Cevimeline for dry mouth  11. Gout: PRN anakinra for gout     RTC: Labs prior to PCP appt in 2 weeks, RTC structural clinic 6 months post watchman       Marina LIZY Soler, CNP  [facility]       CC  Patient Care Team:  Ilene Tristan MD as PCP - General  Ilene Tristan MD as Assigned PCP  OnKirill abarca MD as MD (Otolaryngology)  Aubrey Hutrado MD as MD (Cardiology)  Anderson Perez MD as MD (Clinical Cardiac Electrophysiology)  Radha Rosa MD as MD (Cardiology)  Kiesha Elias APRN CNP as Nurse Practitioner (Cardiology)  Beatriz Blanco, RN as Nurse Coordinator (Cardiology)  Carmenza Stringer MD as MD (Rheumatology)  Danay Payne, RN as Specialty Care Coordinator (Cardiology)  Sabrina Meek [facility] as Pharmacist (Pharmacist)  Flor Velasquez, RN as Specialty Care Coordinator (Cardiology)  Zulma Lopez MD as MD (Rheumatology)  Kami Lang MD as MD (Ophthalmology)  Reina Betancur MD as MD (Internal  Medicine)  Zulma Lopez MD as Referring Physician (Rheumatology)  Zulma Lopez MD as Assigned Rheumatology Provider  Maryjane Tillman MD as Assigned Pulmonology Provider  Kelechi Santana MD as Assigned Surgical Provider  Radha Rosa MD as Assigned Heart and Vascular Provider  Thomas Shannon MD as Assigned Musculoskeletal Provider  Sabrina Meek [facility] as Assigned [facility] Pharmacist  Guru Winsome Dias MD as Assigned Gastroenterology Provider

## 2022-11-14 NOTE — TELEPHONE ENCOUNTER
Screening questions done 11/11/22      - SCHEDULING DETAILS -     Bladimir  Surgeon    11/16/22  Date of Procedure  Upper Endoscopy [EGD]  Type of Procedure Scheduled   [facility] Location       CS Sedation Type     NO PAC / Pre-op Required         Additional comments:

## 2022-11-14 NOTE — TELEPHONE ENCOUNTER
Patient scheduled for EGD on 11/16/22.     Discuss at home test option.     Arrival time: 1:15pm    Facility location: [facility]    Sedation type: CS -- on chronic pain medication. Need to discuss.     Indication for procedure: GAVE, decreasing hemoglobin, anticoagulated on Xarelto    Anticoagulations? Plavix.  Holding interval of 5 days    Diabetic? Yes. Advise to hold oral diabetic medications day of procedure if applicable.     Prep instructions sent via [employer]    Pre visit planning completed.        Suzanne Morocho RN

## 2022-11-14 NOTE — NURSING NOTE
Chief Complaint   Patient presents with     Follow Up     No other vitals to report today     Patient declined individual allergy and medication review by support staff because they deny any changes and state that all information remains accurate since last reviewed/verified. Reviewed last week, no changes per pt    Aiden Mcdaniel, SOPHIA/CMA

## 2022-11-15 ENCOUNTER — TELEPHONE (OUTPATIENT)
Dept: GASTROENTEROLOGY | Facility: CLINIC | Age: 82
End: 2022-11-15

## 2022-11-15 NOTE — TELEPHONE ENCOUNTER
Called and talked to Nghia re: several recent and upcoming issues for Betty...    ---Re: UA request for UTI sx's/Hgb drop (see 11/9 TE and 11/10 ER notes).    UA came back with RBC's, no sign of infection, but on review of tests from that day, noted significant drop in Hgb.  Rec ER.  She went to the ER, noted signs of GI bleed.  Nghia has been working at trying to arrange endoscopy procedure since then, hard due to xarelto, pre-op, insulin issues. She is also worried about Betty as she is not looking well.    Now, the procedure is scheduled for [facility] tomorrow at 8:30am with Dr. Weeks, who said he would do the pre-op prior since pt was just seen and cleared by cardiology.      ---At cardiology recently- watchman/heart looks great.  Anticoagulation recs-  Xarelto- will stay off the xarelto, and after the procedure, she will go to plavix 75mg daily.  She will be on the plavix for 4 1/2 months with asa.  After the 4 1/2 months, will see cardiology and they will assess if she can come off the plavix.      --Discussed DM/Insulin recs for day of endoscopy procedure    - Don't take Novolog insulin on the day of procedure (Tuesday), until you start eating again.     - Take Glargine (Basaglar) 17 units ( ~80% of your usual dose at 22 units) on the day of procedure (Tuesday).    Normal DM medications-  Basaglar 22 units every morning.  Novolog 8 units with meals.  Ozempic 1mg subcutaneous once weekly - no change.        --Discussed follow-up appt here coming up in 2 wks.  Due for yearly fasting labs- will order and she will have them done prior to appt.  Will also recheck UA and see if now clear of RBCs.    A1C and CBC ordered through cardiology.  Rest of yearly labs ordered from me now.      All questions answered-   Lev Tristan MD

## 2022-11-15 NOTE — TELEPHONE ENCOUNTER
Caller: Kenna    Procedure: EGD    Date, Location, and Surgeon of Procedure Cancelled: 11/16/22  [facility]    Ordering Provider:Regi    Reason for cancel (please be detailed, any staff messages or encounters to note?): Patient needs MAC sedation for every other day narcotic use        Rescheduled: Yes     If rescheduled:    Date: 11/17/22   Location: [facility] Resent: No changes (changes to prep?)   Covid Test Rescheduled: No   Note any change or update to original order/sedation: MAC sedation with Steevens    Will use cardiology visit for pre op

## 2022-11-15 NOTE — TELEPHONE ENCOUNTER
Spoke with Carmen in scheduling.     Switched the Pt to MAC at  on Thursday, 11/17/22, arrival of 0830.   Staff message sent to Dr. Gregory's regarding using Cardiology visit as pre-op.

## 2022-11-15 NOTE — TELEPHONE ENCOUNTER
Patient rescheduled to  from [facility] (see note below)    Patient scheduled for EGD on 11/17/22.     Covid test scheduled? no. Discuss at home test option.     Pre op exam scheduled? Call over to  RN procedure nurse regarding pre op. Cards visit was virtual. Per RN they will text/page Dr. Taylor to see if pre op can be completed day of. Will call pre assessment nursing back with status. Writer will send staff message to gastro provider as well.     Arrival time: 0830    Facility location:     Sedation type: MAC d/t narcotic pain medication use.     Anticoagulations? Rivaroxaban (Xarelto). Per note below last dose f 11/10/22.     Diabetic? Yes . Advise to hold oral diabetic medications day of procedure if applicable.     Pre visit planning completed.    Beatriz Anaya RN

## 2022-11-15 NOTE — TELEPHONE ENCOUNTER
Pre assessment questions completed for upcoming EGD procedure scheduled on 11/17/22 with caregiver Nghia.      EMILY policy reviewed. Patient to complete rapid antigen test one to two days before their scheduled procedure. Patient to bring photo of the results when they come in for their procedure.    Pre-op scheduled? 11/17/22 by Dr. Taylor prior to procedure.     Reviewed procedural arrival time 0830 and facility location .    Designated  policy reviewed. Instructed to have someone stay 24 hours post procedure.     Reviewed EGD prep instructions. NPO 2 hours prior to procedure.     Anticoagulation/blood thinners? Rivaroxaban (Xarelto). Last dose 11/10/22. Patient will be switched to Clopidogrel (Plavix) but will start after EGD procedure per ordering provider reported by Nghia..     Diabetic? Yes Per Nghia patient is not on oral diabetic mediations. They have already sent out a message to provider to discuss insulin dosing. Advised to call primary if they have not received direction on this. Nghia agreed to plan.     Nghia verbalized understanding and had no questions or concerns at this time.    Beatriz Anaya RN

## 2022-11-15 NOTE — TELEPHONE ENCOUNTER
CW,  Please see below [employer] message and advise.  Instructions they received say to defer to you.  Thanks,  Sandra AGUIRRE RN

## 2022-11-16 RX ORDER — NALOXONE HYDROCHLORIDE 0.4 MG/ML
0.4 INJECTION, SOLUTION INTRAMUSCULAR; INTRAVENOUS; SUBCUTANEOUS
Status: CANCELLED | OUTPATIENT
Start: 2022-11-16

## 2022-11-16 RX ORDER — PROCHLORPERAZINE MALEATE 5 MG
5 TABLET ORAL EVERY 6 HOURS PRN
Status: CANCELLED | OUTPATIENT
Start: 2022-11-16

## 2022-11-16 RX ORDER — ONDANSETRON 4 MG/1
4 TABLET, ORALLY DISINTEGRATING ORAL EVERY 6 HOURS PRN
Status: CANCELLED | OUTPATIENT
Start: 2022-11-16

## 2022-11-16 RX ORDER — NALOXONE HYDROCHLORIDE 0.4 MG/ML
0.2 INJECTION, SOLUTION INTRAMUSCULAR; INTRAVENOUS; SUBCUTANEOUS
Status: CANCELLED | OUTPATIENT
Start: 2022-11-16

## 2022-11-16 RX ORDER — ONDANSETRON 2 MG/ML
4 INJECTION INTRAMUSCULAR; INTRAVENOUS EVERY 6 HOURS PRN
Status: CANCELLED | OUTPATIENT
Start: 2022-11-16

## 2022-11-16 RX ORDER — FLUMAZENIL 0.1 MG/ML
0.2 INJECTION, SOLUTION INTRAVENOUS
Status: CANCELLED | OUTPATIENT
Start: 2022-11-16 | End: 2022-11-17

## 2022-11-16 NOTE — PATIENT INSTRUCTIONS
You were seen today in the Structural Heart Clinic at the [facility].    Cardiology provider you saw during your visit: Marina Soler NP    Instructions:   Stop Xarelto given favorable CT results   Continue aspirin 81 mg daily lifelong  Start Plavix 75 mg daily x 4.5 months, okay to hold around EGD and hip replacement  Repeat labs in 2 weeks prior to PCP visit   Schedule EGD   Delay any nonurgent dental procedures for the first 6 month post Watchman - call if you need a dental procedure and we will prescribe oral antibiotics to be taken before hand.   Follow-up in structural clinic 4.5 months     Prevention of Infective (Bacterial) Endocarditis:  You are at increased risk for developing adverse outcomes from infective endocarditis (IE), also known as bacterial endocarditis (BE) because of the new device in your heart. The guidelines for prevention of IE are to give patients antibiotics prior to any dental procedures that involve manipulation of gingival tissue or the periapical region of teeth, or perforation of the oral mucosa:      It is recommended to take Amoxicillin 2 gm by mouth as a single dose 30 to 60 minutes before procedure.     OR if allergic to Penicillin or Ampicillin:     Cephalexin 2 gm by mouth, or  Clindamycin 600 mg by mouth, or  Azithromycin or Clarithromycin 500 mg PO       Questions and scheduling:   First call: Structural Heart  Stefania Torres [phone]    General scheduling line: [phone].   First press #1 for the K[facility]cher and then press #3 for Medical Questions to reach the Cardiology triage nurse.     On Call Cardiologist for after hours or on weekends: [phone], press option #4 and ask to speak to the on-call Cardiologist.

## 2022-11-17 ENCOUNTER — ANESTHESIA EVENT (OUTPATIENT)
Dept: GASTROENTEROLOGY | Facility: CLINIC | Age: 82
End: 2022-11-17
Payer: MEDICARE

## 2022-11-17 ENCOUNTER — HOSPITAL ENCOUNTER (OUTPATIENT)
Facility: CLINIC | Age: 82
Discharge: HOME | End: 2022-11-17
Attending: INTERNAL MEDICINE | Admitting: INTERNAL MEDICINE
Payer: MEDICARE

## 2022-11-17 ENCOUNTER — ANESTHESIA (OUTPATIENT)
Dept: GASTROENTEROLOGY | Facility: CLINIC | Age: 82
End: 2022-11-17
Payer: MEDICARE

## 2022-11-17 VITALS — HEART RATE: 50 BPM | OXYGEN SATURATION: 100 % | DIASTOLIC BLOOD PRESSURE: 77 MMHG | SYSTOLIC BLOOD PRESSURE: 113 MMHG

## 2022-11-17 LAB — UPPER GI ENDOSCOPY: NORMAL

## 2022-11-17 PROCEDURE — 43255 EGD CONTROL BLEEDING ANY: CPT | Performed by: INTERNAL MEDICINE

## 2022-11-17 PROCEDURE — 370N000017 HC ANESTHESIA TECHNICAL FEE, PER MIN: Performed by: INTERNAL MEDICINE

## 2022-11-17 PROCEDURE — 999N000010 HC STATISTIC ANES STAT CODE-CRNA PER MINUTE: Performed by: INTERNAL MEDICINE

## 2022-11-17 PROCEDURE — 250N000011 HC RX IP 250 OP 636: Performed by: NURSE ANESTHETIST, CERTIFIED REGISTERED

## 2022-11-17 PROCEDURE — 258N000003 HC RX IP 258 OP 636: Performed by: NURSE ANESTHETIST, CERTIFIED REGISTERED

## 2022-11-17 RX ORDER — FENTANYL CITRATE 50 UG/ML
25 INJECTION, SOLUTION INTRAMUSCULAR; INTRAVENOUS EVERY 5 MIN PRN
Status: CANCELLED | OUTPATIENT
Start: 2022-11-17

## 2022-11-17 RX ORDER — PROPOFOL 10 MG/ML
INJECTION, EMULSION INTRAVENOUS PRN
Status: DISCONTINUED | OUTPATIENT
Start: 2022-11-17 | End: 2022-11-17

## 2022-11-17 RX ORDER — LIDOCAINE 40 MG/G
CREAM TOPICAL
Status: DISCONTINUED | OUTPATIENT
Start: 2022-11-17 | End: 2022-11-17 | Stop reason: HOSPADM

## 2022-11-17 RX ORDER — SODIUM CHLORIDE, SODIUM LACTATE, POTASSIUM CHLORIDE, CALCIUM CHLORIDE 600; 310; 30; 20 MG/100ML; MG/100ML; MG/100ML; MG/100ML
INJECTION, SOLUTION INTRAVENOUS CONTINUOUS PRN
Status: DISCONTINUED | OUTPATIENT
Start: 2022-11-17 | End: 2022-11-17

## 2022-11-17 RX ORDER — HYDROMORPHONE HCL IN WATER/PF 6 MG/30 ML
0.4 PATIENT CONTROLLED ANALGESIA SYRINGE INTRAVENOUS EVERY 5 MIN PRN
Status: CANCELLED | OUTPATIENT
Start: 2022-11-17

## 2022-11-17 RX ORDER — ONDANSETRON 2 MG/ML
4 INJECTION INTRAMUSCULAR; INTRAVENOUS
Status: DISCONTINUED | OUTPATIENT
Start: 2022-11-17 | End: 2022-11-17 | Stop reason: HOSPADM

## 2022-11-17 RX ORDER — ONDANSETRON 2 MG/ML
INJECTION INTRAMUSCULAR; INTRAVENOUS PRN
Status: DISCONTINUED | OUTPATIENT
Start: 2022-11-17 | End: 2022-11-17

## 2022-11-17 RX ORDER — ONDANSETRON 2 MG/ML
4 INJECTION INTRAMUSCULAR; INTRAVENOUS EVERY 30 MIN PRN
Status: CANCELLED | OUTPATIENT
Start: 2022-11-17

## 2022-11-17 RX ORDER — PROPOFOL 10 MG/ML
INJECTION, EMULSION INTRAVENOUS CONTINUOUS PRN
Status: DISCONTINUED | OUTPATIENT
Start: 2022-11-17 | End: 2022-11-17

## 2022-11-17 RX ORDER — ONDANSETRON 4 MG/1
4 TABLET, ORALLY DISINTEGRATING ORAL EVERY 30 MIN PRN
Status: CANCELLED | OUTPATIENT
Start: 2022-11-17

## 2022-11-17 RX ORDER — FENTANYL CITRATE 50 UG/ML
50 INJECTION, SOLUTION INTRAMUSCULAR; INTRAVENOUS EVERY 5 MIN PRN
Status: CANCELLED | OUTPATIENT
Start: 2022-11-17

## 2022-11-17 RX ORDER — HYDROMORPHONE HCL IN WATER/PF 6 MG/30 ML
0.2 PATIENT CONTROLLED ANALGESIA SYRINGE INTRAVENOUS EVERY 5 MIN PRN
Status: CANCELLED | OUTPATIENT
Start: 2022-11-17

## 2022-11-17 RX ORDER — SODIUM CHLORIDE, SODIUM LACTATE, POTASSIUM CHLORIDE, CALCIUM CHLORIDE 600; 310; 30; 20 MG/100ML; MG/100ML; MG/100ML; MG/100ML
INJECTION, SOLUTION INTRAVENOUS CONTINUOUS
Status: CANCELLED | OUTPATIENT
Start: 2022-11-17

## 2022-11-17 RX ADMIN — SODIUM CHLORIDE, POTASSIUM CHLORIDE, SODIUM LACTATE AND CALCIUM CHLORIDE: 600; 310; 30; 20 INJECTION, SOLUTION INTRAVENOUS at 09:26

## 2022-11-17 RX ADMIN — PROPOFOL 20 MG: 10 INJECTION, EMULSION INTRAVENOUS at 09:45

## 2022-11-17 RX ADMIN — ONDANSETRON 4 MG: 2 INJECTION INTRAMUSCULAR; INTRAVENOUS at 09:33

## 2022-11-17 RX ADMIN — PROPOFOL 100 MCG/KG/MIN: 10 INJECTION, EMULSION INTRAVENOUS at 09:29

## 2022-11-17 RX ADMIN — PROPOFOL 30 MG: 10 INJECTION, EMULSION INTRAVENOUS at 09:34

## 2022-11-17 ASSESSMENT — ACTIVITIES OF DAILY LIVING (ADL): ADLS_ACUITY_SCORE: 37

## 2022-11-17 ASSESSMENT — ENCOUNTER SYMPTOMS: DYSRHYTHMIAS: 1

## 2022-11-17 ASSESSMENT — LIFESTYLE VARIABLES: TOBACCO_USE: 0

## 2022-11-17 NOTE — ANESTHESIA PREPROCEDURE EVALUATION
Anesthesia Pre-Procedure Evaluation    Patient: Betty Tee   MRN: [id number] : 1940        Procedure : Procedure(s):  ESOPHAGOGASTRODUODENOSCOPY (EGD)          Past Medical History:   Diagnosis Date     Alcohol abuse, in remission      Allergic rhinitis, cause unspecified     allegra helps when she takes it     Antiplatelet or antithrombotic long-term use      Atrial fibrillation (H)     in hosp in  after surgery w/ fluid overload     Cardiomegaly     LVH on stress echo- cardiac w/u [facility] ER- neg CT scan for PE, neg stress echo in      Chest pain, unspecified      Congestive heart failure (H)      Depressive disorder      Diabetes (H)      Disorder of bone and cartilage, unspecified     osteopenia (had been on prempro), improved on  dexa, stable dexa 11/10     Diverticulosis of colon (without mention of hemorrhage)     last episode yrs ago     Essential hypertension, benign      Follicular bronchiolitis (H)     associated with Sjogrens, dx by chest CT showing mosaic attenuation and air trapping     Gastro-oesophageal reflux disease      ILD (interstitial lung disease) (H)     associated with Sjogrens, also has mildly elevated IgG4, first noted on chest CT  (mild changes) and also has small airways disease; ILD improved on follow up chest CT 2018.     Insomnia, unspecified     weaned off clonazepam     Irregular heart beat      Lumbago     MRI with NEAL, now seeing Dr. Cain for sciatic sx's     Major depressive disorder, recurrent episode, moderate (H)      Obstructive sleep apnea     uses cpap     Osteoarthrosis, unspecified whether generalized or localized, unspecified site      Sjogren's syndrome (H)     + RG and SSA and lip bx     Sleep apnea      Tobacco use disorder     chantix in , started again in , working      Past Surgical History:   Procedure Laterality Date     APPENDECTOMY       BACK SURGERY       BACK SURGERY       BIOPSY BREAST  2002     Biopsy Left Breast     BIOPSY BREAST Left 09/27/2002     BREAST SURGERY       CARDIAC SURGERY       CARDIAC SURGERY       CHOLECYSTECTOMY  1990's?     CHOLECYSTECTOMY       COLECTOMY LEFT  11/07/2011    Procedure:COLECTOMY LEFT; Laparoscopic mobilization of splenic flexture, sigmoid colectomy, coloprotoscopy, loop illeostomy; Surgeon:CK SIDDIQI; [facility]     COLECTOMY LEFT  11/07/2011     COLONOSCOPY  1990,s     COLONOSCOPY N/A 5/3/2022    Procedure: COLONOSCOPY;  Surgeon: Guru Winsome Dias MD;  [facility]     CV LEFT ATRIAL APPENDAGE CLOSURE N/A 9/26/2022    Procedure: Left Atrial Appendage Closure;  Surgeon: Uzair Crews MD;  Location:  HEART CARDIAC CATH LAB     ENT SURGERY       EYE SURGERY  2012     FLEXIBLE SIGMOIDOSCOPY  11/03/2011     HYSTERECTOMY TOTAL ABDOMINAL, BILATERAL SALPINGO-OOPHORECTOMY, COMBINED  11/07/2011    Procedure:COMBINED HYSTERECTOMY TOTAL ABDOMINAL, BILATERAL SALPINGO-OOPHORECTOMY; total abdominal hysterectomy, bilateral salpingo-oophorectomy; Surgeon:ALETA MANUEL; [facility]     HYSTERECTOMY TOTAL ABDOMINAL, BILATERAL SALPINGO-OOPHORECTOMY, COMBINED  11/07/2011     ILEOSTOMY  02/01/2012    takedown loop ileostomy      INSERT STENT URETER  11/07/2011    Procedure:INSERT STENT URETER; Placement of Bilateral Ureteral Stents ; Surgeon:PRANEETH BRYANT; [facility]     SIGMOIDECTOMY  02/01/2012    Dr. Siddiqi, Sigmoidectomy for diverticular abscess, iliostomy afterwards until repair     SIGMOIDOSCOPY FLEXIBLE  11/03/2011    Procedure:SIGMOIDOSCOPY FLEXIBLE; Flexible Sigmoidoscopy; Surgeon:CK SIDDIQI; [facility]     TAKEDOWN ILEOSTOMY  02/01/2012    Procedure:TAKEDOWN ILEOSTOMY; Takedown Loop Ileostomy ; Surgeon:CK SIDDIQI; [facility]     URETERAL STENT PLACEMENT  11/07/2011     ZZC APPENDECTOMY  1970's?     ZZC NONSPECIFIC PROCEDURE  11/2005    exploratory abd lap, adhesions, resolved RLQ pain, diverticulitis episodes       Allergies   Allergen Reactions     Amoxicillin-Pot Clavulanate      Augmentin Nausea and Vomiting     Codeine Nausea and Vomiting     PN: LW Reaction: HIVES     Penicillins Nausea and Vomiting     PN: LW Reaction: GI Upset     Phenobarbital Itching     Seasonal Allergies       Social History     Tobacco Use     Smoking status: Former     Packs/day: 0.50     Years: 10.00     Pack years: 5.00     Types: Cigarettes     Quit date: 2011     Years since quittin.3     Smokeless tobacco: Never     Tobacco comments:      ppd   Substance Use Topics     Alcohol use: No     Alcohol/week: 0.0 standard drinks     Comment: In recovery beginning       Wt Readings from Last 1 Encounters:   11/10/22 82.6 kg (182 lb)        Anesthesia Evaluation            ROS/MED HX  ENT/Pulmonary:     (+) sleep apnea, uses CPAP, allergic rhinitis,  (-) tobacco use and asthma   Neurologic:  - neg neurologic ROS     Cardiovascular:     (+) Dyslipidemia hypertension-----Taking blood thinners CHF dysrhythmias, a-fib, Irregular Heartbeat/Palpitations, Previous cardiac testing   Echo: Date:  Results:  Interpretation Summary  PROCEDURE:  Intra-procedural VERONICA for left atrial appendage closure with 24 mm WATCHMAN  FLEX device.     BASELINE SURVEY:  1. Normal left ventricular systolic function. LVEF 55 to 60%.  2. No intracardiac thrombus.  3. No pericardial effusion.  4. Maximal left atrial appendage orifice diameter 1.9 cm at45 degrees.     PROCEDURAL MONITORIN. Trans-septal puncture, guiding catheter placement and device delivery  performed under VERONICA guidance.  2. Stable device position without any significant device leak. Adequate  compression documented.     POST-PROCEDURAL SURVEY:  1. Left ventricular function is unchanged.  2. No pericardial effusion.  Stress Test: Date: Results:    ECG Reviewed: Date: Results:    Cath: Date: Results:      METS/Exercise Tolerance:     Hematologic:     (+) anemia,     Musculoskeletal:        GI/Hepatic: Comment: H/O ileostomy and reversal.     (+) GERD, liver disease,     Renal/Genitourinary:     (+) renal disease, type: CRI, Nephrolithiasis ,     Endo:     (+) type II DM, Using insulin,     Psychiatric/Substance Use:     (+) psychiatric history anxiety and depression alcohol abuse     Infectious Disease:       Malignancy:  - neg malignancy ROS     Other:            Physical Exam    Airway        Mallampati: II   TM distance: > 3 FB   Neck ROM: full   Mouth opening: > 3 cm    Respiratory Devices and Support         Dental  no notable dental history         Cardiovascular   cardiovascular exam normal       Rhythm and rate: irregular     Pulmonary   pulmonary exam normal        breath sounds clear to auscultation           OUTSIDE LABS:  CBC:   Lab Results   Component Value Date    WBC 7.9 11/10/2022    WBC 6.0 11/10/2022    HGB 8.1 (L) 11/10/2022    HGB 8.0 (L) 11/10/2022    HCT 27.3 (L) 11/10/2022    HCT 27.3 (L) 11/10/2022     11/10/2022     11/10/2022     BMP:   Lab Results   Component Value Date     11/10/2022     11/10/2022    POTASSIUM 4.3 11/10/2022    POTASSIUM 3.5 11/10/2022    CHLORIDE 104 11/10/2022    CHLORIDE 104 11/10/2022    CO2 23 11/10/2022    CO2 22 11/10/2022    BUN 18.5 11/10/2022    BUN 19.6 11/10/2022    CR 1.19 (H) 11/10/2022    CR 1.23 (H) 11/10/2022     (H) 11/10/2022     (H) 11/10/2022     COAGS:   Lab Results   Component Value Date    PTT 31 11/10/2022    INR 1.43 (H) 11/10/2022     POC:   Lab Results   Component Value Date     (H) 10/01/2020     HEPATIC:   Lab Results   Component Value Date    ALBUMIN 4.0 11/10/2022    PROTTOTAL 6.7 11/10/2022    ALT 12 11/10/2022    AST 22 11/10/2022    ALKPHOS 87 11/10/2022    BILITOTAL 0.3 11/10/2022     OTHER:   Lab Results   Component Value Date    PH 7.38 04/06/2017    LACT 2.3 (H) 04/06/2017    A1C 6.4 (H) 08/23/2022    CLAUDY 9.0 11/10/2022    PHOS 3.4 04/11/2017    MAG 2.0 04/11/2017     LIPASE 136 04/02/2017    TSH 2.22 12/09/2021    CRP 10.7 (H) 07/08/2022    SED 30 07/08/2022       Anesthesia Plan    ASA Status:  3   NPO Status:  NPO Appropriate    Anesthesia Type: MAC.   Induction: Intravenous, Propofol.           Consents    Anesthesia Plan(s) and associated risks, benefits, and realistic alternatives discussed. Questions answered and patient/representative(s) expressed understanding.    - Discussed:     - Discussed with:  Patient      - Extended Intubation/Ventilatory Support Discussed: No.      - Patient is DNR/DNI Status: No    Use of blood products discussed: No .     Postoperative Care            Comments:    Other Comments: Patient is counseled on the anesthesia plan and relevant anesthesia procedures including all risks and benefits. All patient questions were answered.             Meet Lewis MD

## 2022-11-17 NOTE — ANESTHESIA POSTPROCEDURE EVALUATION
Patient: Betty Tee    Procedure: Procedure(s):  ESOPHAGOGASTRODUODENOSCOPY, WITH HEMORRHAGE CONTROL       Anesthesia Type:  MAC    Note:  Disposition: Outpatient   Postop Pain Control: Uneventful            Sign Out: Well controlled pain   PONV:    Neuro/Psych: Uneventful            Sign Out: Acceptable/Baseline neuro status   Airway/Respiratory: Uneventful            Sign Out: Acceptable/Baseline resp. status   CV/Hemodynamics: Uneventful            Sign Out: Acceptable CV status   Other NRE: NONE   DID A NON-ROUTINE EVENT OCCUR? No           Last vitals:  Vitals Value Taken Time   /77 11/17/22 1020   Temp     Pulse 50 11/17/22 1023   Resp     SpO2 100 % 11/17/22 1022   Vitals shown include unvalidated device data.    Electronically Signed By: Meet Lewis MD  November 17, 2022  10:58 AM

## 2022-11-17 NOTE — ANESTHESIA CARE TRANSFER NOTE
Patient: Betty Tee    Procedure: Procedure(s):  ESOPHAGOGASTRODUODENOSCOPY, WITH HEMORRHAGE CONTROL       Diagnosis: Upper GI bleed [K92.2]  Diagnosis Additional Information: No value filed.    Anesthesia Type:   MAC     Note:    Oropharynx: oropharynx clear of all foreign objects and spontaneously breathing  Level of Consciousness: awake  Oxygen Supplementation: nasal cannula  Level of Supplemental Oxygen (L/min / FiO2): 2  Independent Airway: airway patency satisfactory and stable  Dentition: dentition unchanged  Vital Signs Stable: post-procedure vital signs reviewed and stable  Report to RN Given: handoff report given  Patient transferred to: PACU    Handoff Report: Identifed the Patient, Identified the Reponsible Provider, Reviewed the pertinent medical history, Discussed the surgical course, Reviewed Intra-OP anesthesia mangement and issues during anesthesia, Set expectations for post-procedure period and Allowed opportunity for questions and acknowledgement of understanding      Vitals:  Vitals Value Taken Time   BP     Temp     Pulse     Resp     SpO2 100 % 11/17/22 1000   Vitals shown include unvalidated device data.    Electronically Signed By: LIZY Salmon CRNA  November 17, 2022  10:01 AM

## 2022-11-17 NOTE — PRE-PROCEDURE
ENDOSCOPY PRE-SEDATION H&P FOR OUTPATIENT PROCEDURES    Betty Tee  [id number]  1940    Procedure: EGD    Pre-procedure diagnosis: Anemia    Patient presents for upper endoscopy due to anemia.  Previously, hemoglobin is down 4 g compared to September 2022.  Last hemoglobin check was 8.1 last week.  She has black stools in the setting of iron use, unchanged in frequency compared to previous, 2 stools in the past 24 hours.  She has shortness of breath with physical activity which is unchanged compared to previous.  No syncope, no dizziness, no abdominal pain or other symptoms.  Complicated medical history including A. fib, watchman procedure, anticoagulated status noted.  Patient is off of systemic anticoagulation at present.    Past medical history:   Past Medical History:   Diagnosis Date     Alcohol abuse, in remission      Allergic rhinitis, cause unspecified     allegra helps when she takes it     Antiplatelet or antithrombotic long-term use      Atrial fibrillation (H)     in hosp in 11/11 after surgery w/ fluid overload     Cardiomegaly     LVH on stress echo- cardiac w/u at [facility] ER- neg CT scan for PE, neg stress echo in 8/06     Chest pain, unspecified      Congestive heart failure (H)      Depressive disorder      Diabetes (H)      Disorder of bone and cartilage, unspecified     osteopenia (had been on prempro), improved on 6/06 dexa, stable dexa 11/10     Diverticulosis of colon (without mention of hemorrhage)     last episode yrs ago     Essential hypertension, benign      Follicular bronchiolitis (H)     associated with Sjogrens, dx by chest CT showing mosaic attenuation and air trapping     Gastro-oesophageal reflux disease      ILD (interstitial lung disease) (H)     associated with Sjogrens, also has mildly elevated IgG4, first noted on chest CT 2015 (mild changes) and also has small airways disease; ILD improved on follow up chest CT 2018.     Insomnia, unspecified     weaned off  clonazepam     Irregular heart beat      Lumbago 7/09    MRI with NEAL, now seeing Dr. Cain for sciatic sx's     Major depressive disorder, recurrent episode, moderate (H)      Obstructive sleep apnea     uses cpap     Osteoarthrosis, unspecified whether generalized or localized, unspecified site      Sjogren's syndrome (H)     + RG and SSA and lip bx     Sleep apnea      Tobacco use disorder     chantix in 9/07, started again in 6/08, working       Past surgical history:   Past Surgical History:   Procedure Laterality Date     APPENDECTOMY       BACK SURGERY  1962     BACK SURGERY  1962     BIOPSY BREAST  09/27/2002    Biopsy Left Breast     BIOPSY BREAST Left 09/27/2002     BREAST SURGERY       CARDIAC SURGERY       CARDIAC SURGERY       CHOLECYSTECTOMY  1990's?     CHOLECYSTECTOMY       COLECTOMY LEFT  11/07/2011    Procedure:COLECTOMY LEFT; Laparoscopic mobilization of splenic flexture, sigmoid colectomy, coloprotoscopy, loop illeostomy; Surgeon:CK CASTANEDA; [facility]     COLECTOMY LEFT  11/07/2011     COLONOSCOPY  1990,s     COLONOSCOPY N/A 5/3/2022    Procedure: COLONOSCOPY;  Surgeon: Guru Winsome Dias MD;  [facility]     CV LEFT ATRIAL APPENDAGE CLOSURE N/A 9/26/2022    Procedure: Left Atrial Appendage Closure;  Surgeon: Uzair Crews MD;  Location:  HEART CARDIAC CATH LAB     ENT SURGERY       EYE SURGERY  2012     FLEXIBLE SIGMOIDOSCOPY  11/03/2011     HYSTERECTOMY TOTAL ABDOMINAL, BILATERAL SALPINGO-OOPHORECTOMY, COMBINED  11/07/2011    Procedure:COMBINED HYSTERECTOMY TOTAL ABDOMINAL, BILATERAL SALPINGO-OOPHORECTOMY; total abdominal hysterectomy, bilateral salpingo-oophorectomy; Surgeon:ALETA MANUEL; [facility]     HYSTERECTOMY TOTAL ABDOMINAL, BILATERAL SALPINGO-OOPHORECTOMY, COMBINED  11/07/2011     ILEOSTOMY  02/01/2012    takedown loop ileostomy      INSERT STENT URETER  11/07/2011    Procedure:INSERT STENT URETER; Placement of Bilateral Ureteral Stents ;  Surgeon:PRANEETH BRYANT; Location[facility]     SIGMOIDECTOMY  02/01/2012    Dr. Siddiqi, Sigmoidectomy for diverticular abscess, iliostomy afterwards until repair     SIGMOIDOSCOPY FLEXIBLE  11/03/2011    Procedure:SIGMOIDOSCOPY FLEXIBLE; Flexible Sigmoidoscopy; Surgeon:CK SIDDIQI; [facility]     TAKEDOWN ILEOSTOMY  02/01/2012    Procedure:TAKEDOWN ILEOSTOMY; Takedown Loop Ileostomy ; Surgeon:CK SIDDIQI; [facility]     URETERAL STENT PLACEMENT  11/07/2011     RUST APPENDECTOMY  1970's?     RUST NONSPECIFIC PROCEDURE  11/2005    exploratory abd lap, adhesions, resolved RLQ pain, diverticulitis episodes       Current Facility-Administered Medications   Medication     lidocaine (LMX4) cream     lidocaine 1 % 0.1-1 mL     ondansetron (ZOFRAN) injection 4 mg     sodium chloride (PF) 0.9% PF flush 3 mL     sodium chloride (PF) 0.9% PF flush 3 mL       Allergies   Allergen Reactions     Amoxicillin-Pot Clavulanate      Augmentin Nausea and Vomiting     Codeine Nausea and Vomiting     PN: LW Reaction: HIVES     Penicillins Nausea and Vomiting     PN: LW Reaction: GI Upset     Phenobarbital Itching     Seasonal Allergies        History of Anesthesia/Sedation Problems: no    Physical Exam:    Mental status: alert or interactive  Heart: Normal  Lung: Mild bibasilar crackles  Assessment of patient's airway: Normal  Other as pertinent for procedure: None     ASA Score: See Provation note    Mallampati score:  I - Faucial pillars, soft palate, and uvula are visible    Assessment/Plan:     The patient is an appropriate candidate to receive sedation.  She has dark/black-colored stools which are unchanged compared to previous, last hemoglobin 8.1.  She has not required transfusion in the past.  No new symptoms at this point, only chronic shortness of breath with physical activity.  She recently had updated cardiology evaluation.  Risk versus benefit was in favor proceeding with upper endoscopy, possibly endoscopic  therapy.    Informed consent was discussed with the patient/family, including the risks, benefits, potential complications and any alternative options associated with sedation.    Patient assessment completed just prior to sedation and while under constant observation by the provider. Condition determined to be adequate for proceeding with sedation.    The specific risks for the procedure were discussed with the patient at the time of informed consent and include but are not limited to perforation which could require surgery, missing significant neoplasm or lesion, hemorrhage and adverse sedative complication.      Juan Diego Taylor MD

## 2022-11-21 ENCOUNTER — TELEPHONE (OUTPATIENT)
Dept: CARDIOLOGY | Facility: CLINIC | Age: 82
End: 2022-11-21

## 2022-11-22 NOTE — MR AVS SNAPSHOT
After Visit Summary   5/16/2017    Betty Tee    MRN: [id number]           Patient Information     Date Of Birth          1940        Visit Information        Provider Department      5/16/2017 11:30 AM Sabrina Meek, [facility]        Today's Diagnoses     ERRONEOUS ENCOUNTER--DISREGARD    -  1       Follow-ups after your visit        Your next 10 appointments already scheduled     May 31, 2017  3:00 PM CDT   Office Visit with Ilene Tristan MD   [facility] ([facility])    [address] [facility] [id number] [phone]           Bring a current list of meds and any records pertaining to this visit.  For Physicals, please bring immunization records and any forms needing to be filled out.  Please arrive 10 minutes early to complete paperwork.            Jun 22, 2017  4:30 PM CDT   Lab with  LAB   [facility] ([facility])    [address] [place] [id number] [phone]            Jun 22, 2017  5:00 PM CDT   (Arrive by 4:45 PM)   RETURN HEART FAILURE with Radha Rosa MD   [facility] ([facility])    [address] [place] [id number] [phone]            Jul 05, 2017 11:00 AM CDT   PFT VISIT with  PFL B   [facility] Pulmonary Function Testing ([facility])    [address] [place] [address] [id number] [phone]            Jul 05, 2017 11:30 AM CDT   (Arrive by 11:15 AM)   Return Interstitial Lung with Meño Walsh MD   [facility] ([facility])    [address] [place] [address] [id number] [phone]              Who to contact     If you have questions or need follow up information about today's clinic visit or your schedule please contact [facility]  Plan: Recommended dove bar soap. \\n\\nMoisturize well with Eucerin. directly at [phone].  Normal or non-critical lab and imaging results will be communicated to you by UnboundIDhart, letter or phone within 4 business days after the clinic has received the results. If you do not hear from us within 7 days, please contact the clinic through UnboundIDhart or phone. If you have a critical or abnormal lab result, we will notify you by phone as soon as possible.  Submit refill requests through SNAPP' or call your pharmacy and they will forward the refill request to us. Please allow 3 business days for your refill to be completed.          Additional Information About Your Visit        UnboundIDhart Information     SNAPP' gives you secure access to your electronic health record. If you see a primary care provider, you can also send messages to your care team and make appointments. If you have questions, please call your primary care clinic.  If you do not have a primary care provider, please call [phone] and they will assist you.        Care EveryWhere ID     This is your Care EveryWhere ID. This could be used by other organizations to access your [facility] medical records  [id number]         Blood Pressure from Last 3 Encounters:   05/16/17 124/60   04/28/17 102/66   04/11/17 127/74    Weight from Last 3 Encounters:   05/16/17 201 lb (91.2 kg)   04/28/17 203 lb 9 oz (92.3 kg)   04/18/17 200 lb (90.7 kg)              Today, you had the following     No orders found for display         Today's Medication Changes          These changes are accurate as of: 5/16/17 12:45 PM.  If you have any questions, ask your nurse or doctor.               These medicines have changed or have updated prescriptions.        Dose/Directions    acyclovir 5 % ointment   Commonly known as:  ZOVIRAX   This may have changed:  additional instructions   Used for:  Recurrent cold sores        Apply topically 6 times daily   Quantity:  15 g   Refills:  3       fluticasone 50 MCG/ACT spray   Commonly known as:  FLONASE   This  Detail Level: Zone may have changed:    - when to take this  - reasons to take this   Used for:  Seasonal allergic rhinitis        Dose:  1-2 spray   Spray 1-2 sprays into both nostrils daily   Quantity:  16 g   Refills:  5       PARoxetine 10 MG tablet   Commonly known as:  PAXIL   This may have changed:    - how much to take  - how to take this  - when to take this  - additional instructions   Used for:  Major depressive disorder, recurrent episode, moderate (H)        TAKE 1 TABLET (10mg) BY MOUTH AT BEDTIME   Quantity:  30 tablet   Refills:  1                Primary Care Provider Office Phone # Fax #    Ilene Tristan [phone]       [facility] [address] [facility]  [address]  [facility] [address]        Thank you!     Thank you for choosing [facility]  for your care. Our goal is always to provide you with excellent care. Hearing back from our patients is one way we can continue to improve our services. Please take a few minutes to complete the written survey that you may receive in the mail after your visit with us. Thank you!             Your Updated Medication List - Protect others around you: Learn how to safely use, store and throw away your medicines at www.disposemymeds.org.          This list is accurate as of: 5/16/17 12:45 PM.  Always use your most recent med list.                   Brand Name Dispense Instructions for use    acyclovir 400 MG tablet    ZOVIRAX     Take 400 mg by mouth See Admin Instructions 5 times daily as needed for outbreaks       acyclovir 5 % ointment    ZOVIRAX    15 g    Apply topically 6 times daily       albuterol 108 (90 BASE) MCG/ACT Inhaler    PROAIR HFA/PROVENTIL HFA/VENTOLIN HFA    1 Inhaler    Inhale 2 puffs into the lungs every 6 hours       atorvastatin 40 MG tablet    LIPITOR    90 tablet    Take 1 tablet (40 mg) by mouth daily       * blood glucose monitoring lancets     4 Box    Use to test blood sugar 4 times daily or as directed.        * blood glucose monitoring lancets     1 Box    Use to test blood sugar 4 times daily or as directed.  Ok to substitute alternative if insurance prefers.       * blood glucose monitoring test strip    no brand specified    300 strip    Use to test blood sugars 4 times daily or as directed       * blood glucose monitoring test strip    ACCU-CHEK SHANNON PLUS    120 strip    Use to test blood sugar 4 times daily or as directed.  Ok to substitute alternative if insurance prefers.       COMPRESSION STOCKINGS     2 each    Wear compression stockings in affected leg (right leg) or both legs most of the time during the day and take them off at night.       fluticasone 220 MCG/ACT Inhaler    FLOVENT HFA    3 Inhaler    Inhale 2 puffs into the lungs 2 times daily       fluticasone 50 MCG/ACT spray    FLONASE    16 g    Spray 1-2 sprays into both nostrils daily       furosemide 40 MG tablet    LASIX    180 tablet    Take 0.5 tablets (20 mg) by mouth 2 times daily       insulin aspart 100 UNIT/ML injection    NovoLOG PEN    3 mL    Inject 10 Units Subcutaneous 3 times daily (with meals)       insulin glargine 100 UNIT/ML injection    LANTUS    3 mL    Inject 23 Units Subcutaneous every morning (before breakfast)       * insulin pen needle 31G X 8 MM    B-D U/F    400 each    Use 4 times daily (with Lantus and Novolog) or as directed.       * insulin pen needle 32G X 4 MM    BD JANE U/F    200 each    Use 4 daily as directed.       ketoconazole 2 % cream    NIZORAL    60 g    Apply topically 2 times daily       lactobacillus rhamnosus (GG) capsule     90 capsule    Take 1 capsule by mouth 3 times daily (before meals)       lisinopril 2.5 MG tablet    PRINIVIL/Zestril    90 tablet    Take 1 tablet (2.5 mg) by mouth daily       metFORMIN 500 MG 24 hr tablet    GLUCOPHAGE-XR    180 tablet    Take 2 tablets (1,000 mg) by mouth daily (with dinner)       metoprolol 100 MG 24 hr tablet    TOPROL-XL    90 tablet    Take 1 tablet (100 mg)  Initiate Treatment: Clobetasol cream bid x weeks, then prn flares Render In Strict Bullet Format?: No by mouth daily       montelukast 10 MG tablet    SINGULAIR    90 tablet    Take 1 tablet (10 mg) by mouth At Bedtime       * order for DME     1 Device    Oxygen: Patient requires supplemental Oxygen 4 LPM via nasal canula with activity. Please provide patient with a home unit and with portability capability. Oxygen will be for a lifetime.       * order for DME     1 Device    Oxygen: Patient requires supplemental Oxygen 4 LPM via nasal canula with activity. Please provide patient with POC to improve mobility, okay to titrate for conserving to keep stats above 90%. Please fax results to [phone].  Oxygen will be for a lifetime.       * pantoprazole 20 MG EC tablet    PROTONIX    60 tablet    Take 1-2 tablets (20-40 mg) by mouth daily       * pantoprazole 40 MG EC tablet    PROTONIX    90 tablet    Take 1 tablet (40 mg) by mouth daily       PARoxetine 10 MG tablet    PAXIL    30 tablet    TAKE 1 TABLET (10mg) BY MOUTH AT BEDTIME       polyethylene glycol Packet    MIRALAX/GLYCOLAX    7 packet    Take 17 g by mouth daily as needed for constipation       potassium chloride SA 20 MEQ CR tablet    K-DUR/KLOR-CON M    90 tablet    Take 1 tablet (20 mEq) by mouth daily       predniSONE 10 MG tablet    DELTASONE    90 tablet    Take 2 tablets for three days (4/12-14), then take 1 tablet daily (10 mg daily starting 4/15)       spironolactone 25 MG tablet    ALDACTONE    90 tablet    Take 1 tablet (25 mg) by mouth daily       traZODone 50 MG tablet    DESYREL    60 tablet    TAKE 1/2-1 TABLET BY MOUTH NIGHTLY AS NEEDED, CAN TITRATE DOWN TO 1/2TAB OR UP TO 2TABS AS NEEDED       TYLENOL 500 MG tablet   Generic drug:  acetaminophen      Take 500 mg by mouth nightly as needed       warfarin 7.5 MG tablet    COUMADIN    100 tablet    Current dose is 7.5 mg daily.  Dose adjusted per INR result.       * Notice:  This list has 10 medication(s) that are the same as other medications prescribed for you. Read the directions  carefully, and ask your doctor or other care provider to review them with you.

## 2022-11-26 ENCOUNTER — HEALTH MAINTENANCE LETTER (OUTPATIENT)
Age: 82
End: 2022-11-26

## 2022-11-26 DIAGNOSIS — Z79.52 CURRENT CHRONIC USE OF SYSTEMIC STEROIDS: ICD-10-CM

## 2022-11-26 DIAGNOSIS — M85.80 OSTEOPENIA, UNSPECIFIED LOCATION: ICD-10-CM

## 2022-11-27 DIAGNOSIS — I50.30 (HFPEF) HEART FAILURE WITH PRESERVED EJECTION FRACTION (H): ICD-10-CM

## 2022-11-27 DIAGNOSIS — E55.9 VITAMIN D DEFICIENCY: ICD-10-CM

## 2022-11-27 DIAGNOSIS — I48.21 PERMANENT ATRIAL FIBRILLATION (H): ICD-10-CM

## 2022-11-28 ENCOUNTER — LAB (OUTPATIENT)
Dept: LAB | Facility: CLINIC | Age: 82
End: 2022-11-28
Payer: MEDICARE

## 2022-11-28 DIAGNOSIS — I48.21 PERMANENT ATRIAL FIBRILLATION (H): ICD-10-CM

## 2022-11-28 DIAGNOSIS — D64.9 ANEMIA, UNSPECIFIED TYPE: ICD-10-CM

## 2022-11-28 DIAGNOSIS — N18.32 STAGE 3B CHRONIC KIDNEY DISEASE (H): ICD-10-CM

## 2022-11-28 DIAGNOSIS — Z95.818 PRESENCE OF WATCHMAN LEFT ATRIAL APPENDAGE CLOSURE DEVICE: ICD-10-CM

## 2022-11-28 DIAGNOSIS — Z79.4 TYPE 2 DIABETES MELLITUS WITH HYPERGLYCEMIA, WITH LONG-TERM CURRENT USE OF INSULIN (H): ICD-10-CM

## 2022-11-28 DIAGNOSIS — E78.5 HYPERLIPIDEMIA LDL GOAL <100: ICD-10-CM

## 2022-11-28 DIAGNOSIS — E55.9 VITAMIN D DEFICIENCY: ICD-10-CM

## 2022-11-28 DIAGNOSIS — E11.65 TYPE 2 DIABETES MELLITUS WITH HYPERGLYCEMIA, WITH LONG-TERM CURRENT USE OF INSULIN (H): ICD-10-CM

## 2022-11-28 DIAGNOSIS — R35.0 URINARY FREQUENCY: ICD-10-CM

## 2022-11-28 LAB
ALBUMIN SERPL BCG-MCNC: 3.9 G/DL (ref 3.5–5.2)
ALBUMIN UR-MCNC: 20 MG/DL
ALP SERPL-CCNC: 84 U/L (ref 35–104)
ALT SERPL W P-5'-P-CCNC: 10 U/L (ref 10–35)
ANION GAP SERPL CALCULATED.3IONS-SCNC: 12 MMOL/L (ref 7–15)
APPEARANCE UR: CLEAR
AST SERPL W P-5'-P-CCNC: 18 U/L (ref 10–35)
BILIRUB SERPL-MCNC: 0.2 MG/DL
BILIRUB UR QL STRIP: NEGATIVE
BUN SERPL-MCNC: 17.4 MG/DL (ref 8–23)
CALCIUM SERPL-MCNC: 9.5 MG/DL (ref 8.8–10.2)
CHLORIDE SERPL-SCNC: 105 MMOL/L (ref 98–107)
COLOR UR AUTO: ABNORMAL
CREAT SERPL-MCNC: 1.09 MG/DL (ref 0.51–0.95)
CREAT UR-MCNC: 74.7 MG/DL
DEPRECATED HCO3 PLAS-SCNC: 25 MMOL/L (ref 22–29)
ERYTHROCYTE [DISTWIDTH] IN BLOOD BY AUTOMATED COUNT: 13.5 % (ref 10–15)
GFR SERPL CREATININE-BSD FRML MDRD: 50 ML/MIN/1.73M2
GLUCOSE SERPL-MCNC: 153 MG/DL (ref 70–99)
GLUCOSE UR STRIP-MCNC: NEGATIVE MG/DL
HBA1C MFR BLD: 5.5 %
HCT VFR BLD AUTO: 33.7 % (ref 35–47)
HGB BLD-MCNC: 10.2 G/DL (ref 11.7–15.7)
HGB UR QL STRIP: NEGATIVE
KETONES UR STRIP-MCNC: NEGATIVE MG/DL
LEUKOCYTE ESTERASE UR QL STRIP: ABNORMAL
MCH RBC QN AUTO: 29.7 PG (ref 26.5–33)
MCHC RBC AUTO-ENTMCNC: 30.3 G/DL (ref 31.5–36.5)
MCV RBC AUTO: 98 FL (ref 78–100)
MICROALBUMIN UR-MCNC: 15.3 MG/L
MICROALBUMIN/CREAT UR: 20.48 MG/G CR (ref 0–25)
NITRATE UR QL: NEGATIVE
PH UR STRIP: 5.5 [PH] (ref 5–7)
PLATELET # BLD AUTO: 220 10E3/UL (ref 150–450)
POTASSIUM SERPL-SCNC: 4 MMOL/L (ref 3.4–5.3)
PROT SERPL-MCNC: 6.9 G/DL (ref 6.4–8.3)
RBC # BLD AUTO: 3.43 10E6/UL (ref 3.8–5.2)
RBC URINE: 1 /HPF
SODIUM SERPL-SCNC: 142 MMOL/L (ref 136–145)
SP GR UR STRIP: 1.01 (ref 1–1.03)
SQUAMOUS EPITHELIAL: <1 /HPF
TSH SERPL DL<=0.005 MIU/L-ACNC: 3.4 UIU/ML (ref 0.3–4.2)
UROBILINOGEN UR STRIP-MCNC: NORMAL MG/DL
WBC # BLD AUTO: 7 10E3/UL (ref 4–11)
WBC URINE: 6 /HPF

## 2022-11-28 PROCEDURE — 83036 HEMOGLOBIN GLYCOSYLATED A1C: CPT | Performed by: NURSE PRACTITIONER

## 2022-11-28 PROCEDURE — 80053 COMPREHEN METABOLIC PANEL: CPT | Performed by: PATHOLOGY

## 2022-11-28 PROCEDURE — 82043 UR ALBUMIN QUANTITATIVE: CPT | Performed by: FAMILY MEDICINE

## 2022-11-28 PROCEDURE — 84443 ASSAY THYROID STIM HORMONE: CPT | Performed by: PATHOLOGY

## 2022-11-28 PROCEDURE — 82306 VITAMIN D 25 HYDROXY: CPT | Performed by: FAMILY MEDICINE

## 2022-11-28 PROCEDURE — 36415 COLL VENOUS BLD VENIPUNCTURE: CPT | Performed by: PATHOLOGY

## 2022-11-28 PROCEDURE — 81001 URINALYSIS AUTO W/SCOPE: CPT | Performed by: PATHOLOGY

## 2022-11-28 PROCEDURE — 85027 COMPLETE CBC AUTOMATED: CPT | Performed by: PATHOLOGY

## 2022-11-28 NOTE — TELEPHONE ENCOUNTER
Routing refill request to provider for review/approval because:  Patient has appointment tomorrow.  Please refill if appropriate with appointment.  Sandra ANTUNEZ RN

## 2022-11-29 ENCOUNTER — VIRTUAL VISIT (OUTPATIENT)
Dept: FAMILY MEDICINE | Facility: CLINIC | Age: 82
End: 2022-11-29
Payer: MEDICARE

## 2022-11-29 DIAGNOSIS — K31.819 GAVE (GASTRIC ANTRAL VASCULAR ECTASIA): ICD-10-CM

## 2022-11-29 DIAGNOSIS — K92.2 LOWER GI BLEED: ICD-10-CM

## 2022-11-29 DIAGNOSIS — E55.9 VITAMIN D DEFICIENCY: ICD-10-CM

## 2022-11-29 DIAGNOSIS — D50.9 IRON DEFICIENCY ANEMIA, UNSPECIFIED IRON DEFICIENCY ANEMIA TYPE: ICD-10-CM

## 2022-11-29 DIAGNOSIS — E11.65 TYPE 2 DIABETES MELLITUS WITH HYPERGLYCEMIA, WITH LONG-TERM CURRENT USE OF INSULIN (H): Primary | ICD-10-CM

## 2022-11-29 DIAGNOSIS — Z79.4 TYPE 2 DIABETES MELLITUS WITH HYPERGLYCEMIA, WITH LONG-TERM CURRENT USE OF INSULIN (H): Primary | ICD-10-CM

## 2022-11-29 DIAGNOSIS — M85.89 OSTEOPENIA OF MULTIPLE SITES: ICD-10-CM

## 2022-11-29 DIAGNOSIS — I48.0 PAROXYSMAL ATRIAL FIBRILLATION (H): ICD-10-CM

## 2022-11-29 DIAGNOSIS — M65.30 TRIGGER FINGER, ACQUIRED: ICD-10-CM

## 2022-11-29 DIAGNOSIS — R79.89 ELEVATED PARATHYROID HORMONE: ICD-10-CM

## 2022-11-29 PROCEDURE — 99215 OFFICE O/P EST HI 40 MIN: CPT | Mod: 95 | Performed by: FAMILY MEDICINE

## 2022-11-29 RX ORDER — FERROUS GLUCONATE 324(38)MG
324 TABLET ORAL
Qty: 90 TABLET | Refills: 1 | Status: SHIPPED | OUTPATIENT
Start: 2022-11-29 | End: 2023-05-30

## 2022-11-29 ASSESSMENT — ENCOUNTER SYMPTOMS
NERVOUS/ANXIOUS: 1
NAUSEA: 1
PARESTHESIAS: 0
SHORTNESS OF BREATH: 1
CONSTIPATION: 1
HEARTBURN: 1
ARTHRALGIAS: 1
PALPITATIONS: 0
HEMATOCHEZIA: 1
ABDOMINAL PAIN: 0
EYE PAIN: 0
CHILLS: 0
COUGH: 0
HEADACHES: 0
BREAST MASS: 0
HEMATURIA: 0
JOINT SWELLING: 0
WEAKNESS: 1
DIARRHEA: 1
FREQUENCY: 1
DYSURIA: 0
SORE THROAT: 0
MYALGIAS: 0
DIZZINESS: 1
FEVER: 0

## 2022-11-29 ASSESSMENT — ACTIVITIES OF DAILY LIVING (ADL)
CURRENT_FUNCTION: LAUNDRY REQUIRES ASSISTANCE
CURRENT_FUNCTION: MEDICATION ADMINISTRATION REQUIRES ASSISTANCE

## 2022-11-29 NOTE — Clinical Note
Looks like pt hasn't made an appt for 2/23 yet... Could you call her and see if she can use one of the hosp/pre-op/proc clinic slots in later feb? If there is a 60 min spot- would use it, fine for 30 min if not. Thanks!! CW

## 2022-11-29 NOTE — PROGRESS NOTES
Betty is a 82 year old who is being evaluated via a billable video visit.      How would you like to obtain your AVS? MyChart  If the video visit is dropped, the invitation should be resent by: Text to cell phone: [phone]  Will anyone else be joining your video visit? No        Assessment & Plan       ICD-10-CM    1. Type 2 diabetes mellitus with hyperglycemia, with long-term current use of insulin (H)  E11.65 Hemoglobin A1c    Z79.4       2. Lower GI bleed  K92.2 ferrous gluconate (FERGON) 324 (38 Fe) MG tablet     Hemoglobin      3. GAVE (gastric antral vascular ectasia)  K31.819 Hemoglobin      4. Iron deficiency anemia, unspecified iron deficiency anemia type  D50.9 ferrous gluconate (FERGON) 324 (38 Fe) MG tablet     Hemoglobin      5. Paroxysmal atrial fibrillation (H)  I48.0 ferrous gluconate (FERGON) 324 (38 Fe) MG tablet      6. Elevated parathyroid hormone  E34.9 Parathyroid Hormone Intact     **Vitamin D Deficiency FUTURE 2mo      7. Vitamin D deficiency  E55.9 Parathyroid Hormone Intact     **Vitamin D Deficiency FUTURE 2mo      8. Osteopenia of multiple sites  M85.89       9. Trigger finger, acquired  M65.30            ---DM II looks great with A1C at 5.5- recheck at next appt- will trust more when hgb more stable.  Fasting glucose levels have been good, 110-130s usually.   Cont q3mo follow-up for DM and multiple chronic issues.    --UA- no blood in last couple checks.    ---Anemia/GAVE/antigoagulation-  -Anticoagulation- cont on plavix x 4 1/2 months per cardiology s/p Watchman procedure.  --[facility] if any further [facility]  --Follow-up with [facility]TM - discuss possible infusions for fosamax or iron due to concerns for esophageal changes on EGD- possible from fosamax, iron or NSAIDS/anticoagulants.  In meantime, will switch to iron gluconate from sulfate- new rx sent.  --Throat/swallowing symptoms- since recent EGD.  If worsening/persisting, needs  "follow-up with GI.    ---MDD/anxiety- doing well on lexapro 20mg/d despite recent passing of one of her best friends from brain cancer.  Will cont at 20mg/d.    ---Vit D/PTH- Vit D increased in 5/22 (to 4000 units/day due to improved but still elevated PTH), will try and add on vit D to labs to see if in good range.  Unable to add PTH.  Will try and check both at upcoming lab draw or next appt.    ---Trigger finger symptoms of right 2nd digit- Not bothersome enough at this point for her to want to see another specialist.  Will request referral if sx's worsen.    Return in about 3 months (around 2/28/2023) for Diabetes, Chronic cares/Med check (A1C/hgb prior)... along with recheck of Vit D and PTH.        I spent a total of 48 minutes on the day of the visit.   Time spent doing chart review, history and exam, documentation and further activities per the note     Ilene Tristan MD  [facility]              Daniel Hernández is a 82 year old, presenting for the following health issues:  RECHECK (A1C)      Healthy Habits:     In general, how would you rate your overall health?  Good    Frequency of exercise:  None    Do you usually eat at least 4 servings of fruit and vegetables a day, include whole grains    & fiber and avoid regularly eating high fat or \"junk\" foods?  No    Taking medications regularly:  Yes    Medication side effects:  None    Ability to successfully perform activities of daily living:  Laundry requires assistance and medication administration requires assistance    Home Safety:  Lack of grab bars in the bathroom    Hearing Impairment:  Difficulty following a conversation in a noisy restaurant or crowded room, feel that people are mumbling or not speaking clearly, difficult to understand a speaker at a public meeting or Church service, need to ask people to speak up or repeat themselves and difficulty understanding soft or whispered speech    In the past 6 months, " have you been bothered by leaking of urine? Yes    In general, how would you rate your overall mental or emotional health?  Good      PHQ-2 Total Score: 2    Additional concerns today:  No       DM II-   A1c is great at 5.5.  Glucose readings- just checking fasting levels.  Usually 134 or lower.  One 'low' but not too low, in the 90s.  Hasn't been checking 2hr after meals. Will try and check a few.  --Insulin regimen  8 units novolog with the three meals/day.  24 units basaglar in am.    Cardiology-   S/p watchman's procedure, went well.  Stopped eliquis prior to EGD, started plavix day after, and plan is to continue on that for 4 1/2 months, then can transition to asa.      GI updates-  Hemoglobin is improving after urgent EGD after hgb noted to have dropped from 12.0 9/23/22 to 8.0 on 11/10/22.  Reviewed labs here, and sent to ER.  Urgent GI follow-up arranged through ER- scheduled for [facility].  Pt notes she was disappointed with care there- felt like a bother.  Wants to go back to the [facility] for further GI cares/appts.  Findings- he noted that he didn't find any bleeding, but re-cauterized some areas.  He noticed esophagus had some signs of potential damage from meds- possibly iron, NSAID, or fosamax- and to ask about potential other or infusion opptions.      Hgb has improved- up from 8's to 10.2 yesterday.      Concerns-   Has had more throat/esophageal sx's.  In the morning, feels like the meds or foods are stuck.  New to her since all these EGD's.  Has to drink lots of mornings, mostly the meds.  Doesn't notice a change on the mornings she does the fosamax.      R 2nd finger - in flexion much of the time.  Can straighten it, but goes back into flexion.  Going on for months.  Irritating but not painful.  Bothers her roommate more than her.  Doesn't think she can tolerate another referral/appts at this point, but will let us know if it worsens.        Review of Systems   Constitutional, HEENT,  cardiovascular, pulmonary, GI, , musculoskeletal, neuro, skin, endocrine and psych systems are negative, except as otherwise noted.      Objective     Vitals:  No vitals were obtained today due to virtual visit.    Physical Exam   GENERAL: Healthy, alert and no distress  EYES: Eyes grossly normal to inspection.  No discharge or erythema, or obvious scleral/conjunctival abnormalities.  RESP: No audible wheeze, cough, or visible cyanosis.  No visible retractions or increased work of breathing.    SKIN: Visible skin clear. No significant rash, abnormal pigmentation or lesions.  NEURO: Cranial nerves grossly intact.  Mentation and speech appropriate for age.  PSYCH: Mentation appears normal, affect normal/bright, judgement and insight intact, normal speech and appearance well-groomed.    Lab on 11/28/2022   Component Date Value Ref Range Status     WBC Count 11/28/2022 7.0  4.0 - 11.0 10e3/uL Final     RBC Count 11/28/2022 3.43 (L)  3.80 - 5.20 10e6/uL Final     Hemoglobin 11/28/2022 10.2 (L)  11.7 - 15.7 g/dL Final     Hematocrit 11/28/2022 33.7 (L)  35.0 - 47.0 % Final     MCV 11/28/2022 98  78 - 100 fL Final     MCH 11/28/2022 29.7  26.5 - 33.0 pg Final     MCHC 11/28/2022 30.3 (L)  31.5 - 36.5 g/dL Final     RDW 11/28/2022 13.5  10.0 - 15.0 % Final     Platelet Count 11/28/2022 220  150 - 450 10e3/uL Final     Hemoglobin A1C 11/28/2022 5.5  <5.7 % Final    Normal <5.7%   Prediabetes 5.7-6.4%    Diabetes 6.5% or higher     Note: Adopted from ADA consensus guidelines.     Sodium 11/28/2022 142  136 - 145 mmol/L Final     Potassium 11/28/2022 4.0  3.4 - 5.3 mmol/L Final     Chloride 11/28/2022 105  98 - 107 mmol/L Final     Carbon Dioxide (CO2) 11/28/2022 25  22 - 29 mmol/L Final     Anion Gap 11/28/2022 12  7 - 15 mmol/L Final     Urea Nitrogen 11/28/2022 17.4  8.0 - 23.0 mg/dL Final     Creatinine 11/28/2022 1.09 (H)  0.51 - 0.95 mg/dL Final     Calcium 11/28/2022 9.5  8.8 - 10.2 mg/dL Final     Glucose 11/28/2022  153 (H)  70 - 99 mg/dL Final     Alkaline Phosphatase 11/28/2022 84  35 - 104 U/L Final     AST 11/28/2022 18  10 - 35 U/L Final     ALT 11/28/2022 10  10 - 35 U/L Final     Protein Total 11/28/2022 6.9  6.4 - 8.3 g/dL Final     Albumin 11/28/2022 3.9  3.5 - 5.2 g/dL Final     Bilirubin Total 11/28/2022 0.2  <=1.2 mg/dL Final     GFR Estimate 11/28/2022 50 (L)  >60 mL/min/1.73m2 Final    Effective December 21, 2021 eGFRcr in adults is calculated using the 2021 CKD-EPI creatinine equation which includes age and gender (Angelita et al., NEJ, DOI: 10.1056/OXRSgk7258427)     Albumin Urine mg/L 11/28/2022 15.3  mg/L Final    The reference ranges have not been established in urine albumin. The results should be integrated into the clinical context for interpretation.     Albumin Urine mg/g Cr 11/28/2022 20.48  0.00 - 25.00 mg/g Cr Final    Microalbuminuria is defined as an albumin:creatinine ratio of 17 to 299 for males and 25 to 299 for females. A ratio of albumin:creatinine of 300 or higher is indicative of overt proteinuria.  Due to biologic variability, positive results should be confirmed by a second, first-morning random or 24-hour timed urine specimen. If there is discrepancy, a third specimen is recommended. When 2 out of 3 results are in the microalbuminuria range, this is evidence for incipient nephropathy and warrants increased efforts at glucose control, blood pressure control, and institution of therapy with an angiotensin-converting-enzyme (ACE) inhibitor (if the patient can tolerate it).       Creatinine Urine mg/dL 11/28/2022 74.7  mg/dL Final    The reference ranges have not been established in urine creatinine. The results should be integrated into the clinical context for interpretation.     Color Urine 11/28/2022 Light Yellow  Colorless, Straw, Light Yellow, Yellow Final     Appearance Urine 11/28/2022 Clear  Clear Final     Glucose Urine 11/28/2022 Negative  Negative mg/dL Final     Bilirubin Urine  11/28/2022 Negative  Negative Final     Ketones Urine 11/28/2022 Negative  Negative mg/dL Final     Specific Gravity Urine 11/28/2022 1.013  1.003 - 1.035 Final     Blood Urine 11/28/2022 Negative  Negative Final     pH Urine 11/28/2022 5.5  5.0 - 7.0 Final     Protein Albumin Urine 11/28/2022 20 (A)  Negative mg/dL Final     Urobilinogen Urine 11/28/2022 Normal  Normal, 2.0 mg/dL Final     Nitrite Urine 11/28/2022 Negative  Negative Final     Leukocyte Esterase Urine 11/28/2022 Trace (A)  Negative Final     RBC Urine 11/28/2022 1  <=2 /HPF Final     WBC Urine 11/28/2022 6 (H)  <=5 /HPF Final     Squamous Epithelials Urine 11/28/2022 <1  <=1 /HPF Final     TSH 11/28/2022 3.40  0.30 - 4.20 uIU/mL Final     Vitamin D, Total (25-Hydroxy) 11/28/2022 59  20 - 75 ug/L Final             Video-Visit Details    Video Start Time: 1:05pm    Type of service:  Video Visit    Video End Time:1:38pm    Originating Location (pt. Location): Home    Distant Location (provider location):  On-site    Platform used for Video Visit: Thad

## 2022-11-30 ENCOUNTER — TELEPHONE (OUTPATIENT)
Dept: CARDIOLOGY | Facility: CLINIC | Age: 82
End: 2022-11-30

## 2022-11-30 PROBLEM — E55.9 VITAMIN D DEFICIENCY: Status: ACTIVE | Noted: 2022-11-30

## 2022-12-01 DIAGNOSIS — M16.11 OSTEOARTHRITIS OF RIGHT HIP, UNSPECIFIED OSTEOARTHRITIS TYPE: ICD-10-CM

## 2022-12-01 LAB — DEPRECATED CALCIDIOL+CALCIFEROL SERPL-MC: 59 UG/L (ref 20–75)

## 2022-12-01 RX ORDER — RIVAROXABAN 20 MG/1
TABLET, FILM COATED ORAL
Qty: 90 TABLET | Refills: 3 | OUTPATIENT
Start: 2022-12-01

## 2022-12-01 NOTE — TELEPHONE ENCOUNTER
ALENDRONATE SODIUM 70 MG TAB  Last Written Prescription Date:  8/16/22  Last Fill Quantity: 12,   # refills: 0  Last Office Visit : 7/8/22  Future Office visit:  1/13/23  DEXA 8/20/2019      Routing refill request to provider for review/approval because:  Last DEXA was 8/20/19. Abn CR.     Latest Reference Range & Units 11/28/22 10:24   Creatinine 0.51 - 0.95 mg/dL 1.09 (H)

## 2022-12-01 NOTE — TELEPHONE ENCOUNTER
Routing to provider to review medication prepped per below    oxyCODONE-acetaminophen (PERCOCET) 7.5-325 MG per tablet#60 Refill:0  Sig:Take 1 tablet by mouth 2 times daily as needed for severe pain With at least 4 hours between doses.  Last picked up 10/20/22 with start on 10/20/22  Due: OK to fill and start 12/1/22    Per last OV 9/21/22: Discussed an opioid rotation to see if she gets better pain relief. She has been noting some benefit with norco but not significant. Will stop norco. Start Percocet 5-325 mg BID prn. 10 day script given. She will my chart to let me know if this is more helpful. If so will provide a 1 month prescription. If not helpful then will resume Norco 7.5-325 mg BID prn.       [facility]/pharmacy #[id number]  [place] - [address] [place] [id number]  Phone: [phone] Fax: [phone]    Ilene SHERIDAN RN Care Coordinator  [facility]

## 2022-12-01 NOTE — TELEPHONE ENCOUNTER
" XARELTO 20 MG TABLET     Last Office Visit : 111/14/22 [place]  Future Office visit:  None    Routing refill request to provider for review/approval because:  Drug not active on patient's medication list- held after 11/10/22 GI bleed seen in ED and discontinued per Jake note 11/14/22:    \"Now s/p successful implantation of 24mm Watchman FLX. Post-procedure VERONICA showed no evidence of benigno-device leak and no pericardial effusion. Has since developed recurrent anemia, likely due to GI bleeding, scheduled for urgent outpatient EGD. CT today shows well seated LAAO without leak or thrombus, no contrast opacification in the ZACHARY suggesting complete endothelialization.  - Stop Xarelto given favorable CT results above   - Continue ASA 81 mg daily lifelong   - Start Plavix x 4.5 months - okay to hold around EGD and hip replacement, resume when safe from bleeding standpoint  - Repeat CBC in 2 weeks with PCP, go to ER if increased bleeding, dizziness, fatigue, syncope   - Continue SBE prophylaxis 6 months  - Return to clinic 6 months post watchman \"      clopidogrel (PLAVIX) 75 MG tablet 90 tablet 1 11/14/2022  --   Sig - Route: Take 1 tablet (75 mg) by mouth daily - Oral       "

## 2022-12-01 NOTE — TELEPHONE ENCOUNTER
Received call from patient requesting refill(s) of oxyCODONE-acetaminophen (PERCOCET) 7.5-325 MG per tablet    Last dispensed from pharmacy on 10/20/22    Patient's last office/virtual visit by prescribing provider on 09/21/22  Next office/virtual appointment scheduled for 12/16/22    Last urine drug screen date 09/22/22  Current opioid agreement on file (completed within the last year) Yes Date of opioid agreement: 09/21/22    E-prescribe to pharmacy-[facility]/pharmacy #[id number] - [place], [place] - [address]     Will route to nursing [place] for review and preparation of prescription(s).

## 2022-12-01 NOTE — TELEPHONE ENCOUNTER
[facility] Call Center    Phone Message    May a detailed message be left on voicemail: yes     Reason for Call: Medication Refill Request      Has the patient contacted the pharmacy for the refill? Yes     Name of medication being requested:     oxyCODONE-acetaminophen (PERCOCET) 7.5-325 MG per tablet    Provider who prescribed the medication: Alanna Alba    Pharmacy: [facility]    Date medication is needed: ASAP

## 2022-12-02 ENCOUNTER — OFFICE VISIT (OUTPATIENT)
Dept: PULMONOLOGY | Facility: CLINIC | Age: 82
End: 2022-12-02
Attending: INTERNAL MEDICINE
Payer: MEDICARE

## 2022-12-02 VITALS — SYSTOLIC BLOOD PRESSURE: 126 MMHG | HEART RATE: 50 BPM | OXYGEN SATURATION: 97 % | DIASTOLIC BLOOD PRESSURE: 78 MMHG

## 2022-12-02 DIAGNOSIS — J44.89 FOLLICULAR BRONCHIOLITIS (H): ICD-10-CM

## 2022-12-02 DIAGNOSIS — J44.89 FOLLICULAR BRONCHIOLITIS (H): Primary | ICD-10-CM

## 2022-12-02 PROCEDURE — 94729 DIFFUSING CAPACITY: CPT | Performed by: INTERNAL MEDICINE

## 2022-12-02 PROCEDURE — 94726 PLETHYSMOGRAPHY LUNG VOLUMES: CPT | Performed by: INTERNAL MEDICINE

## 2022-12-02 PROCEDURE — 99214 OFFICE O/P EST MOD 30 MIN: CPT | Mod: 25 | Performed by: INTERNAL MEDICINE

## 2022-12-02 PROCEDURE — G0463 HOSPITAL OUTPT CLINIC VISIT: HCPCS | Mod: 25

## 2022-12-02 PROCEDURE — 94375 RESPIRATORY FLOW VOLUME LOOP: CPT | Performed by: INTERNAL MEDICINE

## 2022-12-02 RX ORDER — OXYCODONE AND ACETAMINOPHEN 7.5; 325 MG/1; MG/1
1 TABLET ORAL 2 TIMES DAILY PRN
Qty: 60 TABLET | Refills: 0 | Status: SHIPPED | OUTPATIENT
Start: 2022-12-02 | End: 2023-01-06

## 2022-12-02 ASSESSMENT — PAIN SCALES - GENERAL: PAINLEVEL: NO PAIN (0)

## 2022-12-02 NOTE — TELEPHONE ENCOUNTER
Signed Prescriptions:                        Disp   Refills    oxyCODONE-acetaminophen (PERCOCET) 7.5-325*60 tab*0        Sig: Take 1 tablet by mouth 2 times daily as needed for           severe pain (7-10) With at least 4 hours between           doses. OK to fill and start 12/1/22  Authorizing Provider: OMARI JONES MD  [facility]

## 2022-12-02 NOTE — NURSING NOTE
Chief Complaint   Patient presents with     RECHECK     Return Sjogrens       Vitals were taken and medications were reconciled.     Mary Alice Murray RMA  12:02 PM

## 2022-12-02 NOTE — PROGRESS NOTES
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  She had mild ILD on a previous chest CT scan in 2015, but follow-up chest CT scan in 2018 showed improvement with no obvious ILD present on the CT scan.  The follicular bronchiolitis changes were still present.  She has been on prednisone for her Sjogren's for approximately 5 years, and that is managed by Dr. Lopez in rheumatology. Her medications for follicular bronchiolitis include current prednisone dose 5 mg daily, azithromycin 250 mg daily, montelukast daily, and Breo Ellipta inhaler 100-25. These are all anti-inflammatories for her bronchiolitis.    Today, Sister Sita reports that she has had multiple endoscopies for GAVE.  She is still anemic.  In terms of her lungs, her dyspnea on exertion is better than it was when I saw her in the spring.  Her activity is limited by right hip pain rather than dyspnea.  She needs a new hip, but surgery is delayed until her anemia and GAVE are stabilized.    She does endorse chronic morning cough that is not bothersome.  She continues to have sicca symptoms.  She has noticed some wheezing when she lies down, but not when she is upright.  She also endorses dry skin.  She denies fevers.    She has received the flu vaccine and the bivalent COVID booster.            Current Outpatient Medications   Medication     ACCU-CHEK SHANNON PLUS test strip     acetaminophen (TYLENOL) 500 MG tablet     acyclovir (ZOVIRAX) 400 MG tablet     acyclovir (ZOVIRAX) 5 % external ointment     albuterol (PROAIR HFA/PROVENTIL HFA/VENTOLIN HFA) 108 (90 Base) MCG/ACT inhaler     alendronate (FOSAMAX) 70 MG tablet     anakinra (KINERET) 100 MG/0.67ML SOSY injection     aspirin (ASA) 81 MG EC  tablet     atorvastatin (LIPITOR) 10 MG tablet     azithromycin (ZITHROMAX) 250 MG tablet     BD PEN NEEDLE JANE 2ND GEN 32G X 4 MM miscellaneous     blood glucose (NO BRAND SPECIFIED) lancets standard     cevimeline (EVOXAC) 30 MG capsule     Cholecalciferol (VITAMIN D3) 50 MCG (2000 UT) CAPS     clopidogrel (PLAVIX) 75 MG tablet     COMPOUNDED NON-CONTROLLED SUBSTANCE (CMPD RX) - PHARMACY TO MIX COMPOUNDED MEDICATION     cyanocobalamin (VITAMIN B-12) 1000 MCG tablet     escitalopram (LEXAPRO) 20 MG tablet     ferrous gluconate (FERGON) 324 (38 Fe) MG tablet     ferrous sulfate (FEROSUL) 325 (65 Fe) MG tablet     fluticasone (FLONASE) 50 MCG/ACT nasal spray     fluticasone-vilanterol (BREO ELLIPTA) 100-25 MCG/INH inhaler     hydroxychloroquine (PLAQUENIL) 200 MG tablet     insulin glargine (BASAGLAR KWIKPEN) 100 UNIT/ML pen     ketoconazole (NIZORAL) 2 % external cream     KLOR-CON 20 MEQ CR tablet     lidocaine (LIDODERM) 5 % patch     loratadine (CLARITIN) 10 MG tablet     methocarbamol (ROBAXIN) 750 MG tablet     metoprolol succinate ER (TOPROL XL) 50 MG 24 hr tablet     montelukast (SINGULAIR) 10 MG tablet     NOVOLOG FLEXPEN 100 UNIT/ML soln     oxyCODONE-acetaminophen (PERCOCET) 5-325 MG tablet     oxyCODONE-acetaminophen (PERCOCET) 7.5-325 MG per tablet     OZEMPIC, 1 MG/DOSE, 4 MG/3ML SOPN     pantoprazole (PROTONIX) 40 MG EC tablet     predniSONE (DELTASONE) 5 MG tablet     spironolactone (ALDACTONE) 25 MG tablet     torsemide (DEMADEX) 10 MG tablet     torsemide (DEMADEX) 20 MG tablet     traZODone (DESYREL) 50 MG tablet     No current facility-administered medications for this visit.     Allergies   Allergen Reactions     Amoxicillin-Pot Clavulanate      Augmentin Nausea and Vomiting     Codeine Nausea and Vomiting     PN: LW Reaction: HIVES     Penicillins Nausea and Vomiting     PN: LW Reaction: GI Upset     Phenobarbital Itching     Seasonal Allergies      Past Medical History:   Diagnosis Date      Alcohol abuse, in remission      Allergic rhinitis, cause unspecified     allegra helps when she takes it     Antiplatelet or antithrombotic long-term use      Atrial fibrillation (H)     in hosp in 11/11 after surgery w/ fluid overload     Cardiomegaly     LVH on stress echo- cardiac w/u at [facility] ER- neg CT scan for PE, neg stress echo in 8/06     Chest pain, unspecified      Congestive heart failure (H)      Depressive disorder      Diabetes (H)      Disorder of bone and cartilage, unspecified     osteopenia (had been on prempro), improved on 6/06 dexa, stable dexa 11/10     Diverticulosis of colon (without mention of hemorrhage)     last episode yrs ago     Essential hypertension, benign      Follicular bronchiolitis (H)     associated with Sjogrens, dx by chest CT showing mosaic attenuation and air trapping     Gastro-oesophageal reflux disease      ILD (interstitial lung disease) (H)     associated with Sjogrens, also has mildly elevated IgG4, first noted on chest CT 2015 (mild changes) and also has small airways disease; ILD improved on follow up chest CT 2018.     Insomnia, unspecified     weaned off clonazepam     Irregular heart beat      Lumbago 07/2009    MRI with DJD, now seeing Dr. Cain for sciatic sx's     Major depressive disorder, recurrent episode, moderate (H)      Obstructive sleep apnea     uses cpap     Osteoarthrosis, unspecified whether generalized or localized, unspecified site      Sjogren's syndrome (H)     + RG and SSA and lip bx     Sleep apnea     uses a cpap machine     Tobacco use disorder     chantix in 9/07, started again in 6/08, working       Past Surgical History:   Procedure Laterality Date     APPENDECTOMY       BACK SURGERY  1962     BACK SURGERY  1962     BIOPSY BREAST  09/27/2002    Biopsy Left Breast     BIOPSY BREAST Left 09/27/2002     BREAST SURGERY       CARDIAC SURGERY       CARDIAC SURGERY       CHOLECYSTECTOMY  1990's?     CHOLECYSTECTOMY       COLECTOMY LEFT   11/07/2011    Procedure:COLECTOMY LEFT; Laparoscopic mobilization of splenic flexture, sigmoid colectomy, coloprotoscopy, loop illeostomy; Surgeon:CK SIDDIQI; Location:[facility] OR     COLECTOMY LEFT  11/07/2011     COLONOSCOPY  1990,s     COLONOSCOPY N/A 5/3/2022    Procedure: COLONOSCOPY;  Surgeon: Guru Winsome Dias MD;  Location: [facility]     CV LEFT ATRIAL APPENDAGE CLOSURE N/A 9/26/2022    Procedure: Left Atrial Appendage Closure;  Surgeon: Uzair Crews MD;  Location: [facility]     ENT SURGERY       EYE SURGERY  2012     FLEXIBLE SIGMOIDOSCOPY  11/03/2011     HYSTERECTOMY TOTAL ABDOMINAL, BILATERAL SALPINGO-OOPHORECTOMY, COMBINED  11/07/2011    Procedure:COMBINED HYSTERECTOMY TOTAL ABDOMINAL, BILATERAL SALPINGO-OOPHORECTOMY; total abdominal hysterectomy, bilateral salpingo-oophorectomy; Surgeon:ALETA MANUEL; Location:[facility]     HYSTERECTOMY TOTAL ABDOMINAL, BILATERAL SALPINGO-OOPHORECTOMY, COMBINED  11/07/2011     ILEOSTOMY  02/01/2012    takedown loop ileostomy      INSERT STENT URETER  11/07/2011    Procedure:INSERT STENT URETER; Placement of Bilateral Ureteral Stents ; Surgeon:PRANEETH BRYANT; Location:[facility] OR     SIGMOIDECTOMY  02/01/2012    Dr. Siddiqi, Sigmoidectomy for diverticular abscess, iliostomy afterwards until repair     SIGMOIDOSCOPY FLEXIBLE  11/03/2011    Procedure:SIGMOIDOSCOPY FLEXIBLE; Flexible Sigmoidoscopy; Surgeon:CK SIDDIQI; Location:[facility] OR     TAKEDOWN ILEOSTOMY  02/01/2012    Procedure:TAKEDOWN ILEOSTOMY; Takedown Loop Ileostomy ; Surgeon:CK SIDDIQI; Location:[facility]     URETERAL STENT PLACEMENT  11/07/2011     ZZC APPENDECTOMY  1970's?     ZZC NONSPECIFIC PROCEDURE  11/2005    exploratory abd lap, adhesions, resolved RLQ pain, diverticulitis episodes       Social History     Socioeconomic History     Marital status: Single     Spouse name: Not on file     Number of children: 0     Years of education: Ed Spec De     Highest education  level: Not on file   Occupational History     Occupation: Professor     Employer: [employer]     Comment: [employer]- Education   Tobacco Use     Smoking status: Former     Packs/day: 0.50     Years: 10.00     Pack years: 5.00     Types: Cigarettes     Quit date: 2011     Years since quittin.3     Smokeless tobacco: Never     Tobacco comments:      ppd   Substance and Sexual Activity     Alcohol use: No     Alcohol/week: 0.0 standard drinks     Comment: In recovery beginning      Drug use: No     Sexual activity: Never   Other Topics Concern     Parent/sibling w/ CABG, MI or angioplasty before 65F 55M? Yes   Social History Narrative                 Social Determinants of Health     Financial Resource Strain: Not on file   Food Insecurity: Not on file   Transportation Needs: Not on file   Physical Activity: Not on file   Stress: Not on file   Social Connections: Not on file   Intimate Partner Violence: Not on file   Housing Stability: Not on file       Family History   Problem Relation Age of Onset     C.A.D. Mother 63        MI- first at age 63     Heart Disease Mother      Hypertension Mother      Cerebrovascular Disease Mother      Hyperlipidemia Mother      Coronary Artery Disease Mother         MI-first at age 63     Other - See Comments Mother         cerebrovascular disease      Alcohol/Drug Father      Alzheimer Disease Father      Dementia Father      Hypertension Father      Hyperlipidemia Father      Substance Abuse Father      Diabetes Sister      Hypertension Sister      Hyperlipidemia Sister      Substance Abuse Sister      Asthma Sister      C.A.D. Sister 52        Minor MI- age 50's     Heart Disease Sister      Hypertension Sister      Hypertension Sister      Cancer - colorectal Sister 48        Late 40's early 50's     Gastrointestinal Disease Sister         Diverticulitis     Lipids Sister      Lipids Sister      Diabetes Sister      Heart  Disease Sister         CHF     Cancer Sister         lung, smoker     Substance Abuse Sister      Asthma Sister      Cancer Sister      Coronary Artery Disease Sister         minor MI-age 50's     Heart Disease Sister      Hypertension Sister      Hypertension Sister      Colon Cancer Sister      Diverticulitis Sister      Lung Cancer Sister      Heart Disease Sister         CHF     Diabetes Sister      Asthma Sister      Hypertension Brother      Prostate Cancer Brother 74        Dx'd age 74     Gastrointestinal Disease Brother         Diverticulitis     Parkinsonism Brother      Substance Abuse Brother      Prostate Cancer Brother      Hyperlipidemia Brother      Hypertension Brother      Prostate Cancer Brother      Diverticulitis Brother      Parkinsonism Brother      Breast Cancer Daughter      Breast Cancer Daughter      Diabetes Other      Hypertension Other      Anesthesia Reaction No family hx of      Deep Vein Thrombosis (DVT) No family hx of            Vitals: /78   Pulse 50   SpO2 97%     Exam:   GENERAL APPEARANCE: Well developed, well nourished, alert, and in no apparent distress.  RESP: good air flow throughout.  No crackles. No rhonchi. No wheezes.  No Inspiratory squeaks.  CV: Normal S1, S2, regular rhythm, normal rate. No murmur.  No LE edema.   MS: extremities normal. No clubbing. No cyanosis.  SKIN: no rash on limited exam.  NEURO: Mentation intact, speech normal.  Sitting in wheelchair.  PSYCH: mentation appears normal. and affect normal/bright.     Results:  Recent Results (from the past 168 hour(s))   CBC with platelets    Collection Time: 11/28/22 10:24 AM   Result Value Ref Range    WBC Count 7.0 4.0 - 11.0 10e3/uL    RBC Count 3.43 (L) 3.80 - 5.20 10e6/uL    Hemoglobin 10.2 (L) 11.7 - 15.7 g/dL    Hematocrit 33.7 (L) 35.0 - 47.0 %    MCV 98 78 - 100 fL    MCH 29.7 26.5 - 33.0 pg    MCHC 30.3 (L) 31.5 - 36.5 g/dL    RDW 13.5 10.0 - 15.0 %    Platelet Count 220 150 - 450 10e3/uL    Hemoglobin A1c    Collection Time: 11/28/22 10:24 AM   Result Value Ref Range    Hemoglobin A1C 5.5 <5.7 %   Comprehensive metabolic panel (BMP + Alb, Alk Phos, ALT, AST, Total. Bili, TP)    Collection Time: 11/28/22 10:24 AM   Result Value Ref Range    Sodium 142 136 - 145 mmol/L    Potassium 4.0 3.4 - 5.3 mmol/L    Chloride 105 98 - 107 mmol/L    Carbon Dioxide (CO2) 25 22 - 29 mmol/L    Anion Gap 12 7 - 15 mmol/L    Urea Nitrogen 17.4 8.0 - 23.0 mg/dL    Creatinine 1.09 (H) 0.51 - 0.95 mg/dL    Calcium 9.5 8.8 - 10.2 mg/dL    Glucose 153 (H) 70 - 99 mg/dL    Alkaline Phosphatase 84 35 - 104 U/L    AST 18 10 - 35 U/L    ALT 10 10 - 35 U/L    Protein Total 6.9 6.4 - 8.3 g/dL    Albumin 3.9 3.5 - 5.2 g/dL    Bilirubin Total 0.2 <=1.2 mg/dL    GFR Estimate 50 (L) >60 mL/min/1.73m2   TSH with free T4 reflex    Collection Time: 11/28/22 10:24 AM   Result Value Ref Range    TSH 3.40 0.30 - 4.20 uIU/mL   Vitamin D Deficiency    Collection Time: 11/28/22 10:24 AM   Result Value Ref Range    Vitamin D, Total (25-Hydroxy) 59 20 - 75 ug/L   Albumin Random Urine Quantitative with Creat Ratio    Collection Time: 11/28/22 10:29 AM   Result Value Ref Range    Albumin Urine mg/L 15.3 mg/L    Albumin Urine mg/g Cr 20.48 0.00 - 25.00 mg/g Cr    Creatinine Urine mg/dL 74.7 mg/dL   UA with Microscopic reflex to Culture - lab collect    Collection Time: 11/28/22 10:29 AM    Specimen: Urine, Midstream   Result Value Ref Range    Color Urine Light Yellow Colorless, Straw, Light Yellow, Yellow    Appearance Urine Clear Clear    Glucose Urine Negative Negative mg/dL    Bilirubin Urine Negative Negative    Ketones Urine Negative Negative mg/dL    Specific Gravity Urine 1.013 1.003 - 1.035    Blood Urine Negative Negative    pH Urine 5.5 5.0 - 7.0    Protein Albumin Urine 20 (A) Negative mg/dL    Urobilinogen Urine Normal Normal, 2.0 mg/dL    Nitrite Urine Negative Negative    Leukocyte Esterase Urine Trace (A) Negative    RBC Urine 1 <=2  /HPF    WBC Urine 6 (H) <=5 /HPF    Squamous Epithelials Urine <1 <=1 /HPF   General PFT Lab (Please always keep checked)    Collection Time: 12/02/22 10:59 AM   Result Value Ref Range    FVC-Pred 2.62 L    FVC-Pre 2.04 L    FVC-%Pred-Pre 77 %    FEV1-Pre 1.63 L    FEV1-%Pred-Pre 82 %    FEV1FVC-Pred 77 %    FEV1FVC-Pre 80 %    FEFMax-Pred 4.83 L/sec    FEFMax-Pre 6.32 L/sec    FEFMax-%Pred-Pre 130 %    FEF2575-Pred 1.55 L/sec    FEF2575-Pre 1.49 L/sec    BBW9789-%Pred-Pre 95 %    ExpTime-Pre 6.59 sec    FIFMax-Pre 2.86 L/sec    VC-Pred 3.00 L    VC-Pre 2.06 L    VC-%Pred-Pre 68 %    IC-Pred 2.56 L    IC-Pre 1.67 L    IC-%Pred-Pre 65 %    ERV-Pred 0.44 L    ERV-Pre 0.40 L    ERV-%Pred-Pre 89 %    FEV1FEV6-Pred 77 %    FEV1FEV6-Pre 80 %    FRCPleth-Pred 2.81 L    FRCPleth-Pre 2.42 L    FRCPleth-%Pred-Pre 86 %    RVPleth-Pred 2.32 L    RVPleth-Pre 2.02 L    RVPleth-%Pred-Pre 87 %    TLCPleth-Pred 5.19 L    TLCPleth-Pre 4.09 L    TLCPleth-%Pred-Pre 78 %    DLCOunc-Pred 19.63 ml/min/mmHg    DLCOunc-Pre 15.89 ml/min/mmHg    DLCOunc-%Pred-Pre 80 %    DLCOcor-Pre 17.94 ml/min/mmHg    DLCOcor-%Pred-Pre 91 %    VA-Pre 3.64 L    VA-%Pred-Pre 74 %    FEV1SVC-Pred 66 %    FEV1SVC-Pre 79 %       I reviewed pulmonary function test that was performed today.  This shows normal spirometry, lung volumes and diffusing capacity.  Lung function is stable and overall improved compared to 2015.      I reviewed results with the patient.      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  She had mild ILD on a previous chest CT scan in 2015, but follow-up chest CT scan in 2018 showed improvement with no obvious ILD present on the CT scan.   1.  Follicular bronchiolitis with Sjogren's.  Her dyspnea on exertion has improved as her anemia has improved.  PFT is normal and overall improved compared to 2015.  She is tolerating her anti-inflammatories  well, so we will continue montelukast 10 mg daily, azithromycin 250 mg daily and Breo Ellipta inhaler.  She is not able to exercise because of her hip pain, and she needs a new hip.  If she does require surgery for a new hip replacement, I think she would be a good candidate for for surgery as her PFT is normal and her lung disease is well controlled.  She will return in 6 months with full PFT for follow-up.  I spent 30 minutes reviewing chart, reviewing test results, talking with and examining patient, formulating plan, and documentation on the day of the encounter.

## 2022-12-02 NOTE — LETTER
12/2/2022         RE: Betty Tee  [address]  [facility] [address]        Dear Colleague,    Thank you for referring your patient, Betty Tee, to the [facility] [place]. Please see a copy of my visit note below.    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  She had mild ILD on a previous chest CT scan in 2015, but follow-up chest CT scan in 2018 showed improvement with no obvious ILD present on the CT scan.  The follicular bronchiolitis changes were still present.  She has been on prednisone for her Sjogren's for approximately 5 years, and that is managed by Dr. Lopez in rheumatology. Her medications for follicular bronchiolitis include current prednisone dose 5 mg daily, azithromycin 250 mg daily, montelukast daily, and Breo Ellipta inhaler 100-25. These are all anti-inflammatories for her bronchiolitis.    Today, Sister Sita reports that she has had multiple endoscopies for GAVE.  She is still anemic.  In terms of her lungs, her dyspnea on exertion is better than it was when I saw her in the spring.  Her activity is limited by right hip pain rather than dyspnea.  She needs a new hip, but surgery is delayed until her anemia and GAVE are stabilized.    She does endorse chronic morning cough that is not bothersome.  She continues to have sicca symptoms.  She has noticed some wheezing when she lies down, but not when she is upright.  She also endorses dry skin.  She denies fevers.    She has received the flu vaccine and the bivalent COVID booster.            Current Outpatient Medications   Medication     ACCU-CHEK SHANNON PLUS test strip     acetaminophen (TYLENOL) 500 MG  tablet     acyclovir (ZOVIRAX) 400 MG tablet     acyclovir (ZOVIRAX) 5 % external ointment     albuterol (PROAIR HFA/PROVENTIL HFA/VENTOLIN HFA) 108 (90 Base) MCG/ACT inhaler     alendronate (FOSAMAX) 70 MG tablet     anakinra (KINERET) 100 MG/0.67ML SOSY injection     aspirin (ASA) 81 MG EC tablet     atorvastatin (LIPITOR) 10 MG tablet     azithromycin (ZITHROMAX) 250 MG tablet     BD PEN NEEDLE JANE 2ND GEN 32G X 4 MM miscellaneous     blood glucose (NO BRAND SPECIFIED) lancets standard     cevimeline (EVOXAC) 30 MG capsule     Cholecalciferol (VITAMIN D3) 50 MCG (2000 UT) CAPS     clopidogrel (PLAVIX) 75 MG tablet     COMPOUNDED NON-CONTROLLED SUBSTANCE (CMPD RX) - PHARMACY TO MIX COMPOUNDED MEDICATION     cyanocobalamin (VITAMIN B-12) 1000 MCG tablet     escitalopram (LEXAPRO) 20 MG tablet     ferrous gluconate (FERGON) 324 (38 Fe) MG tablet     ferrous sulfate (FEROSUL) 325 (65 Fe) MG tablet     fluticasone (FLONASE) 50 MCG/ACT nasal spray     fluticasone-vilanterol (BREO ELLIPTA) 100-25 MCG/INH inhaler     hydroxychloroquine (PLAQUENIL) 200 MG tablet     insulin glargine (BASAGLAR KWIKPEN) 100 UNIT/ML pen     ketoconazole (NIZORAL) 2 % external cream     KLOR-CON 20 MEQ CR tablet     lidocaine (LIDODERM) 5 % patch     loratadine (CLARITIN) 10 MG tablet     methocarbamol (ROBAXIN) 750 MG tablet     metoprolol succinate ER (TOPROL XL) 50 MG 24 hr tablet     montelukast (SINGULAIR) 10 MG tablet     NOVOLOG FLEXPEN 100 UNIT/ML soln     oxyCODONE-acetaminophen (PERCOCET) 5-325 MG tablet     oxyCODONE-acetaminophen (PERCOCET) 7.5-325 MG per tablet     OZEMPIC, 1 MG/DOSE, 4 MG/3ML SOPN     pantoprazole (PROTONIX) 40 MG EC tablet     predniSONE (DELTASONE) 5 MG tablet     spironolactone (ALDACTONE) 25 MG tablet     torsemide (DEMADEX) 10 MG tablet     torsemide (DEMADEX) 20 MG tablet     traZODone (DESYREL) 50 MG tablet     No current facility-administered medications for this visit.     Allergies   Allergen  Reactions     Amoxicillin-Pot Clavulanate      Augmentin Nausea and Vomiting     Codeine Nausea and Vomiting     PN: LW Reaction: HIVES     Penicillins Nausea and Vomiting     PN: LW Reaction: GI Upset     Phenobarbital Itching     Seasonal Allergies      Past Medical History:   Diagnosis Date     Alcohol abuse, in remission      Allergic rhinitis, cause unspecified     allegra helps when she takes it     Antiplatelet or antithrombotic long-term use      Atrial fibrillation (H)     in hosp in 11/11 after surgery w/ fluid overload     Cardiomegaly     LVH on stress echo- cardiac w/u at [facility] ER- neg CT scan for PE, neg stress echo in 8/06     Chest pain, unspecified      Congestive heart failure (H)      Depressive disorder      Diabetes (H)      Disorder of bone and cartilage, unspecified     osteopenia (had been on prempro), improved on 6/06 dexa, stable dexa 11/10     Diverticulosis of colon (without mention of hemorrhage)     last episode yrs ago     Essential hypertension, benign      Follicular bronchiolitis (H)     associated with Sjogrens, dx by chest CT showing mosaic attenuation and air trapping     Gastro-oesophageal reflux disease      ILD (interstitial lung disease) (H)     associated with Sjogrens, also has mildly elevated IgG4, first noted on chest CT 2015 (mild changes) and also has small airways disease; ILD improved on follow up chest CT 2018.     Insomnia, unspecified     weaned off clonazepam     Irregular heart beat      Lumbago 07/2009    MRI with NEAL, now seeing Dr. Cain for sciatic sx's     Major depressive disorder, recurrent episode, moderate (H)      Obstructive sleep apnea     uses cpap     Osteoarthrosis, unspecified whether generalized or localized, unspecified site      Sjogren's syndrome (H)     + RG and SSA and lip bx     Sleep apnea     uses a cpap machine     Tobacco use disorder     chantix in 9/07, started again in 6/08, working       Past Surgical History:   Procedure  Laterality Date     APPENDECTOMY       BACK SURGERY  1962     BACK SURGERY  1962     BIOPSY BREAST  09/27/2002    Biopsy Left Breast     BIOPSY BREAST Left 09/27/2002     BREAST SURGERY       CARDIAC SURGERY       CARDIAC SURGERY       CHOLECYSTECTOMY  1990's?     CHOLECYSTECTOMY       COLECTOMY LEFT  11/07/2011    Procedure:COLECTOMY LEFT; Laparoscopic mobilization of splenic flexture, sigmoid colectomy, coloprotoscopy, loop illeostomy; Surgeon:CK SIDDIQI; Location:[facility] OR     COLECTOMY LEFT  11/07/2011     COLONOSCOPY  1990,s     COLONOSCOPY N/A 5/3/2022    Procedure: COLONOSCOPY;  Surgeon: Guru Winsome Dias MD;  Location: [facility] GI     CV LEFT ATRIAL APPENDAGE CLOSURE N/A 9/26/2022    Procedure: Left Atrial Appendage Closure;  Surgeon: Uzair Crews MD;  Location:  HEART CARDIAC CATH LAB     ENT SURGERY       EYE SURGERY  2012     FLEXIBLE SIGMOIDOSCOPY  11/03/2011     HYSTERECTOMY TOTAL ABDOMINAL, BILATERAL SALPINGO-OOPHORECTOMY, COMBINED  11/07/2011    Procedure:COMBINED HYSTERECTOMY TOTAL ABDOMINAL, BILATERAL SALPINGO-OOPHORECTOMY; total abdominal hysterectomy, bilateral salpingo-oophorectomy; Surgeon:ALETA MANUEL; Location:[facility] OR     HYSTERECTOMY TOTAL ABDOMINAL, BILATERAL SALPINGO-OOPHORECTOMY, COMBINED  11/07/2011     ILEOSTOMY  02/01/2012    takedown loop ileostomy      INSERT STENT URETER  11/07/2011    Procedure:INSERT STENT URETER; Placement of Bilateral Ureteral Stents ; Surgeon:PRANEETH BRYANT; Location:[facility] OR     SIGMOIDECTOMY  02/01/2012    Dr. Siddiqi, Sigmoidectomy for diverticular abscess, iliostomy afterwards until repair     SIGMOIDOSCOPY FLEXIBLE  11/03/2011    Procedure:SIGMOIDOSCOPY FLEXIBLE; Flexible Sigmoidoscopy; Surgeon:CK SIDDIQI; Location:[facility] OR     TAKEDOWN ILEOSTOMY  02/01/2012    Procedure:TAKEDOWN ILEOSTOMY; Takedown Loop Ileostomy ; Surgeon:CK SIDDIQI; Location:[facility] OR     URETERAL STENT PLACEMENT  11/07/2011     [facility] APPENDECTOMY  1970's?      ZZC NONSPECIFIC PROCEDURE  2005    exploratory abd lap, adhesions, resolved RLQ pain, diverticulitis episodes       Social History     Socioeconomic History     Marital status: Single     Spouse name: Not on file     Number of children: 0     Years of education: Ed Spec De     Highest education level: Not on file   Occupational History     Occupation: Professor     Employer: [employer]     Comment: [employer]- Education   Tobacco Use     Smoking status: Former     Packs/day: 0.50     Years: 10.00     Pack years: 5.00     Types: Cigarettes     Quit date: 2011     Years since quittin.3     Smokeless tobacco: Never     Tobacco comments:      ppd   Substance and Sexual Activity     Alcohol use: No     Alcohol/week: 0.0 standard drinks     Comment: In recovery beginning      Drug use: No     Sexual activity: Never   Other Topics Concern     Parent/sibling w/ CABG, MI or angioplasty before 65F 55M? Yes   Social History Narrative                 Social Determinants of Health     Financial Resource Strain: Not on file   Food Insecurity: Not on file   Transportation Needs: Not on file   Physical Activity: Not on file   Stress: Not on file   Social Connections: Not on file   Intimate Partner Violence: Not on file   Housing Stability: Not on file       Family History   Problem Relation Age of Onset     C.A.D. Mother 63        MI- first at age 63     Heart Disease Mother      Hypertension Mother      Cerebrovascular Disease Mother      Hyperlipidemia Mother      Coronary Artery Disease Mother         MI-first at age 63     Other - See Comments Mother         cerebrovascular disease      Alcohol/Drug Father      Alzheimer Disease Father      Dementia Father      Hypertension Father      Hyperlipidemia Father      Substance Abuse Father      Diabetes Sister      Hypertension Sister      Hyperlipidemia Sister      Substance Abuse Sister      Asthma Sister      C.A.D. Sister  52        Minor MI- age 50's     Heart Disease Sister      Hypertension Sister      Hypertension Sister      Cancer - colorectal Sister 48        Late 40's early 50's     Gastrointestinal Disease Sister         Diverticulitis     Lipids Sister      Lipids Sister      Diabetes Sister      Heart Disease Sister         CHF     Cancer Sister         lung, smoker     Substance Abuse Sister      Asthma Sister      Cancer Sister      Coronary Artery Disease Sister         minor MI-age 50's     Heart Disease Sister      Hypertension Sister      Hypertension Sister      Colon Cancer Sister      Diverticulitis Sister      Lung Cancer Sister      Heart Disease Sister         CHF     Diabetes Sister      Asthma Sister      Hypertension Brother      Prostate Cancer Brother 74        Dx'd age 74     Gastrointestinal Disease Brother         Diverticulitis     Parkinsonism Brother      Substance Abuse Brother      Prostate Cancer Brother      Hyperlipidemia Brother      Hypertension Brother      Prostate Cancer Brother      Diverticulitis Brother      Parkinsonism Brother      Breast Cancer Daughter      Breast Cancer Daughter      Diabetes Other      Hypertension Other      Anesthesia Reaction No family hx of      Deep Vein Thrombosis (DVT) No family hx of            Vitals: /78   Pulse 50   SpO2 97%     Exam:   GENERAL APPEARANCE: Well developed, well nourished, alert, and in no apparent distress.  RESP: good air flow throughout.  No crackles. No rhonchi. No wheezes.  No Inspiratory squeaks.  CV: Normal S1, S2, regular rhythm, normal rate. No murmur.  No LE edema.   MS: extremities normal. No clubbing. No cyanosis.  SKIN: no rash on limited exam.  NEURO: Mentation intact, speech normal.  Sitting in wheelchair.  PSYCH: mentation appears normal. and affect normal/bright.     Results:  Recent Results (from the past 168 hour(s))   CBC with platelets    Collection Time: 11/28/22 10:24 AM   Result Value Ref Range    WBC Count  7.0 4.0 - 11.0 10e3/uL    RBC Count 3.43 (L) 3.80 - 5.20 10e6/uL    Hemoglobin 10.2 (L) 11.7 - 15.7 g/dL    Hematocrit 33.7 (L) 35.0 - 47.0 %    MCV 98 78 - 100 fL    MCH 29.7 26.5 - 33.0 pg    MCHC 30.3 (L) 31.5 - 36.5 g/dL    RDW 13.5 10.0 - 15.0 %    Platelet Count 220 150 - 450 10e3/uL   Hemoglobin A1c    Collection Time: 11/28/22 10:24 AM   Result Value Ref Range    Hemoglobin A1C 5.5 <5.7 %   Comprehensive metabolic panel (BMP + Alb, Alk Phos, ALT, AST, Total. Bili, TP)    Collection Time: 11/28/22 10:24 AM   Result Value Ref Range    Sodium 142 136 - 145 mmol/L    Potassium 4.0 3.4 - 5.3 mmol/L    Chloride 105 98 - 107 mmol/L    Carbon Dioxide (CO2) 25 22 - 29 mmol/L    Anion Gap 12 7 - 15 mmol/L    Urea Nitrogen 17.4 8.0 - 23.0 mg/dL    Creatinine 1.09 (H) 0.51 - 0.95 mg/dL    Calcium 9.5 8.8 - 10.2 mg/dL    Glucose 153 (H) 70 - 99 mg/dL    Alkaline Phosphatase 84 35 - 104 U/L    AST 18 10 - 35 U/L    ALT 10 10 - 35 U/L    Protein Total 6.9 6.4 - 8.3 g/dL    Albumin 3.9 3.5 - 5.2 g/dL    Bilirubin Total 0.2 <=1.2 mg/dL    GFR Estimate 50 (L) >60 mL/min/1.73m2   TSH with free T4 reflex    Collection Time: 11/28/22 10:24 AM   Result Value Ref Range    TSH 3.40 0.30 - 4.20 uIU/mL   Vitamin D Deficiency    Collection Time: 11/28/22 10:24 AM   Result Value Ref Range    Vitamin D, Total (25-Hydroxy) 59 20 - 75 ug/L   Albumin Random Urine Quantitative with Creat Ratio    Collection Time: 11/28/22 10:29 AM   Result Value Ref Range    Albumin Urine mg/L 15.3 mg/L    Albumin Urine mg/g Cr 20.48 0.00 - 25.00 mg/g Cr    Creatinine Urine mg/dL 74.7 mg/dL   UA with Microscopic reflex to Culture - lab collect    Collection Time: 11/28/22 10:29 AM    Specimen: Urine, Midstream   Result Value Ref Range    Color Urine Light Yellow Colorless, Straw, Light Yellow, Yellow    Appearance Urine Clear Clear    Glucose Urine Negative Negative mg/dL    Bilirubin Urine Negative Negative    Ketones Urine Negative Negative mg/dL     Specific Gravity Urine 1.013 1.003 - 1.035    Blood Urine Negative Negative    pH Urine 5.5 5.0 - 7.0    Protein Albumin Urine 20 (A) Negative mg/dL    Urobilinogen Urine Normal Normal, 2.0 mg/dL    Nitrite Urine Negative Negative    Leukocyte Esterase Urine Trace (A) Negative    RBC Urine 1 <=2 /HPF    WBC Urine 6 (H) <=5 /HPF    Squamous Epithelials Urine <1 <=1 /HPF   General PFT Lab (Please always keep checked)    Collection Time: 12/02/22 10:59 AM   Result Value Ref Range    FVC-Pred 2.62 L    FVC-Pre 2.04 L    FVC-%Pred-Pre 77 %    FEV1-Pre 1.63 L    FEV1-%Pred-Pre 82 %    FEV1FVC-Pred 77 %    FEV1FVC-Pre 80 %    FEFMax-Pred 4.83 L/sec    FEFMax-Pre 6.32 L/sec    FEFMax-%Pred-Pre 130 %    FEF2575-Pred 1.55 L/sec    FEF2575-Pre 1.49 L/sec    OTA5245-%Pred-Pre 95 %    ExpTime-Pre 6.59 sec    FIFMax-Pre 2.86 L/sec    VC-Pred 3.00 L    VC-Pre 2.06 L    VC-%Pred-Pre 68 %    IC-Pred 2.56 L    IC-Pre 1.67 L    IC-%Pred-Pre 65 %    ERV-Pred 0.44 L    ERV-Pre 0.40 L    ERV-%Pred-Pre 89 %    FEV1FEV6-Pred 77 %    FEV1FEV6-Pre 80 %    FRCPleth-Pred 2.81 L    FRCPleth-Pre 2.42 L    FRCPleth-%Pred-Pre 86 %    RVPleth-Pred 2.32 L    RVPleth-Pre 2.02 L    RVPleth-%Pred-Pre 87 %    TLCPleth-Pred 5.19 L    TLCPleth-Pre 4.09 L    TLCPleth-%Pred-Pre 78 %    DLCOunc-Pred 19.63 ml/min/mmHg    DLCOunc-Pre 15.89 ml/min/mmHg    DLCOunc-%Pred-Pre 80 %    DLCOcor-Pre 17.94 ml/min/mmHg    DLCOcor-%Pred-Pre 91 %    VA-Pre 3.64 L    VA-%Pred-Pre 74 %    FEV1SVC-Pred 66 %    FEV1SVC-Pre 79 %       I reviewed pulmonary function test that was performed today.  This shows normal spirometry, lung volumes and diffusing capacity.  Lung function is stable and overall improved compared to 2015.      I reviewed results with the patient.      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  She had mild ILD on a previous chest CT scan in 2015, but  follow-up chest CT scan in 2018 showed improvement with no obvious ILD present on the CT scan.   1.  Follicular bronchiolitis with Sjogren's.  Her dyspnea on exertion has improved as her anemia has improved.  PFT is normal and overall improved compared to 2015.  She is tolerating her anti-inflammatories well, so we will continue montelukast 10 mg daily, azithromycin 250 mg daily and Breo Ellipta inhaler.  She is not able to exercise because of her hip pain, and she needs a new hip.  If she does require surgery for a new hip replacement, I think she would be a good candidate for for surgery as her PFT is normal and her lung disease is well controlled.  She will return in 6 months with full PFT for follow-up.  I spent 30 minutes reviewing chart, reviewing test results, talking with and examining patient, formulating plan, and documentation on the day of the encounter.    Again, thank you for allowing me to participate in the care of your patient.        Sincerely,        Maryjane Tillman MD

## 2022-12-03 RX ORDER — ALENDRONATE SODIUM 70 MG/1
TABLET ORAL
Qty: 12 TABLET | Refills: 0 | Status: SHIPPED | OUTPATIENT
Start: 2022-12-03 | End: 2023-02-24

## 2022-12-06 LAB
DLCOCOR-%PRED-PRE: 91 %
DLCOCOR-PRE: 17.94 ML/MIN/MMHG
DLCOUNC-%PRED-PRE: 80 %
DLCOUNC-PRE: 15.89 ML/MIN/MMHG
DLCOUNC-PRED: 19.63 ML/MIN/MMHG
ERV-%PRED-PRE: 89 %
ERV-PRE: 0.4 L
ERV-PRED: 0.44 L
EXPTIME-PRE: 6.59 SEC
FEF2575-%PRED-PRE: 95 %
FEF2575-PRE: 1.49 L/SEC
FEF2575-PRED: 1.55 L/SEC
FEFMAX-%PRED-PRE: 130 %
FEFMAX-PRE: 6.32 L/SEC
FEFMAX-PRED: 4.83 L/SEC
FEV1-%PRED-PRE: 82 %
FEV1-PRE: 1.63 L
FEV1FEV6-PRE: 80 %
FEV1FEV6-PRED: 77 %
FEV1FVC-PRE: 80 %
FEV1FVC-PRED: 77 %
FEV1SVC-PRE: 79 %
FEV1SVC-PRED: 66 %
FIFMAX-PRE: 2.86 L/SEC
FRCPLETH-%PRED-PRE: 86 %
FRCPLETH-PRE: 2.42 L
FRCPLETH-PRED: 2.81 L
FVC-%PRED-PRE: 77 %
FVC-PRE: 2.04 L
FVC-PRED: 2.62 L
IC-%PRED-PRE: 65 %
IC-PRE: 1.67 L
IC-PRED: 2.56 L
RVPLETH-%PRED-PRE: 87 %
RVPLETH-PRE: 2.02 L
RVPLETH-PRED: 2.32 L
TLCPLETH-%PRED-PRE: 78 %
TLCPLETH-PRE: 4.09 L
TLCPLETH-PRED: 5.19 L
VA-%PRED-PRE: 74 %
VA-PRE: 3.64 L
VC-%PRED-PRE: 68 %
VC-PRE: 2.06 L
VC-PRED: 3 L

## 2022-12-09 DIAGNOSIS — J30.1 SEASONAL ALLERGIC RHINITIS DUE TO POLLEN: ICD-10-CM

## 2022-12-09 NOTE — TELEPHONE ENCOUNTER
Routing refill request to provider for review/approval because:  Patient outside of recommended age range.  Sandra ANTUNEZ RN

## 2022-12-10 RX ORDER — LORATADINE 10 MG/1
TABLET ORAL
Qty: 90 TABLET | Refills: 3 | Status: SHIPPED | OUTPATIENT
Start: 2022-12-10 | End: 2024-01-17

## 2022-12-16 ENCOUNTER — PATIENT OUTREACH (OUTPATIENT)
Dept: CARE COORDINATION | Facility: CLINIC | Age: 82
End: 2022-12-16

## 2022-12-16 ENCOUNTER — OFFICE VISIT (OUTPATIENT)
Dept: PALLIATIVE MEDICINE | Facility: CLINIC | Age: 82
End: 2022-12-16
Payer: MEDICARE

## 2022-12-16 VITALS — HEART RATE: 50 BPM | DIASTOLIC BLOOD PRESSURE: 77 MMHG | OXYGEN SATURATION: 98 % | SYSTOLIC BLOOD PRESSURE: 138 MMHG

## 2022-12-16 DIAGNOSIS — G89.29 CHRONIC PAIN OF RIGHT HIP: Primary | ICD-10-CM

## 2022-12-16 DIAGNOSIS — M16.11 OSTEOARTHRITIS OF RIGHT HIP, UNSPECIFIED OSTEOARTHRITIS TYPE: ICD-10-CM

## 2022-12-16 DIAGNOSIS — F11.90 CHRONIC, CONTINUOUS USE OF OPIOIDS: ICD-10-CM

## 2022-12-16 DIAGNOSIS — M25.551 CHRONIC PAIN OF RIGHT HIP: Primary | ICD-10-CM

## 2022-12-16 PROCEDURE — 99213 OFFICE O/P EST LOW 20 MIN: CPT | Performed by: PHYSICAL MEDICINE & REHABILITATION

## 2022-12-16 ASSESSMENT — PAIN SCALES - GENERAL: PAINLEVEL: MODERATE PAIN (5)

## 2022-12-16 NOTE — PATIENT INSTRUCTIONS
No changes made today. Continue percocet 7.5-325 mg twice daily as needed.   Follow up with me in 3 months.    Alanna Alba MD  [facility]     ----------------------------------------------------------------  Clinic Number:  [phone]   Call with any questions about your care and for scheduling assistance.   Calls are returned Monday through Friday between 8 AM and 4:30 PM. We usually get back to you within 2 business days depending on the issue/request.    If we are prescribing your medications:  For opioid medication refills, call the clinic or send a Dtime message 7 days in advance.  Please include:  Name of requested medication  Name of the pharmacy.  For non-opioid medications, call your pharmacy directly to request a refill. Please allow 3-4 days to be processed.   Per [place] State Law:  All controlled substance prescriptions must be filled within 30 days of being written.    For those controlled substances allowing refills, pickup must occur within 30 days of last fill.      We believe regular attendance is key to your success in our program!    Any time you are unable to keep your appointment we ask that you call us at least 24 hours in advance to cancel.This will allow us to offer the appointment time to another patient.   Multiple missed appointments may lead to dismissal from the clinic.

## 2022-12-16 NOTE — PROGRESS NOTES
[facility] Follow Up     Date of visit: 12/16/2022    Last seen by me on 9/21/22.      Assessment:  Sister Betty Buckner is a 82 year old medically complex patient with past medical history including: Atrial fibrillation, VLH, History of DVT, CHF, HLD, HTN, DM 2, Stage 3 CKD, RLS, Depression, History of etoh abuse (remission 30+ years) with the following chronic pain conditions:     1. Right sided hip and leg pain: Betty reports a long standing history of right hip and leg pain. She has been having worsening right lateral hip and groin pain. She also has a long standing history of pain that starts in her right low back/upper buttock and radiates laterally and posteriorly down the right leg to the foot. Initially was getting improvement with piriformis injection but declining benefit over time. Significant but short term improvement noted with hip injections. Appears that hip pain is likely due to a combination of right sided gluteal weakness/dysfunction and severe right hip OA.    Plan:  1. Therapies:  On hold for now  2. Clinical Health Pain Psychologist: Not at this time  3. Diagnostic Studies: None  4. Medication Management:   1. Continue Percocet 7.5-325 mg BID prn. She reports minimal pain relief but does not want to make changes at this time. Can consider increase to 10 mg BID prn if pain becomes too severe.   2. Continue methocarbamol 750 mg TID prn       5. Further procedures recommended: None. No long term benefit with hip injections. Is planning on having a hip replacement in the near future.   6. Agree with using a wheelchair when needing to ambulate longer distances since walking is the main aggravating factor for her pain  7. Follow up: 3 months      Chief complaint:   Chief Complaint   Patient presents with     Pain     Since her last visit, Betty Tee reports:  - Since the last visit she had the watchman procedure completed. Doing well from that standpoint  -  Had some rectal bleeding after that ans was seen in the ER. Has been stable.   - she is scheduled to follow up with Orthopedics in January to discuss hip replacement surgery and timing of that   - she has found that taking percocet with the methocarbamol is more helpful than taking one alone.   - pain is ok with sitting. Standing and walking are very painful.   - she is meeting [facility] surgeon in January.     Pain scores:  Pain intensity currently is 5 on a scale of 0-10.     Medications:       Current pain medications:  -Tylenol 1000mg q8h prn - Usually takes 1-2 times a day  -Escitalopram 20mg daily              -percocet 7.5-325mg BID prn - takes edge off the pain              -methocarbamol 750mg tid prn              -Voltaren 1% gel qid prn    Current calculated MME: 22         Previous pain medications:  -Tramadol 50mg prn  -Tizanidine, flexeril - nh              -Gabapentin 300mg TID - memory difficulty       Past pain treatments:  Physical Therapy: hasn't completed PT for low back or hip pain                TENS unit: hasn't tried  Pain psychology: hasn't tried  Surgery: Lumbar spine surgery in the 1960s.   Injections:   - right intra-articular hip injections on 7/22/22 and 3/9/22 - H short term   -Right piriformis injection on 4/1/21 with 60% relief for 8 weeks. Repeated in June with approx 50% relief.  -Two epidural injections (right L5 tfesi), feb 2020 was helpful the next day but she had a fall right after, aug 2020 procedure - not helpful, had worsening pain  Alternative Therapies:               Chiropractic: hasn't tried               Acupuncture:  Yes - [place]    Medications:  Current Outpatient Medications   Medication Sig Dispense Refill     ACCU-CHEK SHANNON PLUS test strip USE TO TEST BLOOD SUGARS 3 TIMES DAILY 300 strip 5     acetaminophen (TYLENOL) 500 MG tablet Take 1,000 mg by mouth every 8 hours as needed (max 6 tablets/24 hours, 2 tablets/dose)       acyclovir (ZOVIRAX) 400 MG tablet Take 1 tablet  (400 mg) by mouth 3 times daily as needed For a couple days 15 tablet 2     acyclovir (ZOVIRAX) 5 % external ointment Apply topically as needed (6 times day PRN for outbreaks) As needed for outbreaks 15 g 3     albuterol (PROAIR HFA/PROVENTIL HFA/VENTOLIN HFA) 108 (90 Base) MCG/ACT inhaler Inhale 2 puffs into the lungs every 6 hours 18 g 11     alendronate (FOSAMAX) 70 MG tablet TAKE 1 TABLET BY MOUTH EVERY 7 DAYS 12 tablet 0     anakinra (KINERET) 100 MG/0.67ML SOSY injection 100 mg every day x 3 days as needed for flare ups 6.7 mL 3     aspirin (ASA) 81 MG EC tablet Take 1 tablet (81 mg) by mouth daily 90 tablet 1     atorvastatin (LIPITOR) 10 MG tablet TAKE 1/2 TABLET BY MOUTH EVERY DAY 45 tablet 0     azithromycin (ZITHROMAX) 250 MG tablet TAKE 1 TABLET BY MOUTH EVERY DAY 30 tablet 5     BD PEN NEEDLE JANE 2ND GEN 32G X 4 MM miscellaneous USE 4 DAILY AS DIRECTED. 400 each 1     blood glucose (NO BRAND SPECIFIED) lancets standard Use to test blood sugar 3 times daily or as directed 100 lancet 3     cevimeline (EVOXAC) 30 MG capsule Take 1 capsule (30 mg) by mouth 3 times daily (Patient taking differently: Take 30 mg by mouth twice a week On Tuesdays and Fridays) 270 capsule 2     Cholecalciferol (VITAMIN D3) 50 MCG (2000 UT) CAPS TAKE 100 MCG BY MOUTH DAILY (TAKE 2 TABLET (50 MCG) BY MOUTH DAILY - ORAL) 90 capsule 1     clopidogrel (PLAVIX) 75 MG tablet Take 1 tablet (75 mg) by mouth daily 90 tablet 1     COMPOUNDED NON-CONTROLLED SUBSTANCE (CMPD RX) - PHARMACY TO MIX COMPOUNDED MEDICATION Estriol 1 mg/g Apply small amount to finger and apply to inside vagina daily for 2 weeks then twice weekly Route: vaginally Dispense 30 grams 11 refills 30 g 11     cyanocobalamin (VITAMIN B-12) 1000 MCG tablet Take 1 tablet (1,000 mcg) by mouth daily 30 tablet 1     escitalopram (LEXAPRO) 20 MG tablet Take 1 tablet (20 mg) by mouth daily 90 tablet 0     ferrous gluconate (FERGON) 324 (38 Fe) MG tablet Take 1 tablet (324 mg) by  mouth daily (with breakfast) 90 tablet 1     ferrous sulfate (FEROSUL) 325 (65 Fe) MG tablet TAKE 1 TABLET BY MOUTH EVERY DAY WITH BREAKFAST 90 tablet 0     fluticasone (FLONASE) 50 MCG/ACT nasal spray SPRAY 1-2 SPRAYS INTO BOTH NOSTRILS DAILY AS NEEDED FOR ALLERGIES 16 mL 11     fluticasone-vilanterol (BREO ELLIPTA) 100-25 MCG/INH inhaler Inhale 1 puff into the lungs daily 1 each 11     hydroxychloroquine (PLAQUENIL) 200 MG tablet Take 2 tablets (400 mg) by mouth daily Get annual eye exams for hydroxychloroquine (Plaquenil) monitoring and fax to [phone] 180 tablet 3     insulin glargine (BASAGLAR KWIKPEN) 100 UNIT/ML pen INJECT 22 UNITS SUBCUTANEOUS DAILY (TO REPLACE LANTUS) (Patient taking differently: INJECT 24 UNITS SUBCUTANEOUS DAILY (TO REPLACE LANTUS)) 15 mL 1     ketoconazole (NIZORAL) 2 % external cream Apply topically 2 times daily as needed for itching 60 g 1     KLOR-CON 20 MEQ CR tablet TAKE 2 TABLETS (40 MEQ) BY MOUTH EVERY MORNING AND 1 TABLET (20 MEQ) EVERY EVENING. 270 tablet 3     lidocaine (LIDODERM) 5 % patch APPLY PATCH TO PAINFUL AREA FOR UP TO 12 H WITHIN A 24 H PERIOD. REMOVE AFTER 12 HOURS. (Patient taking differently: Apply patch to painful area for up to 12 h within a 24 h period.  Remove after 12 hours.) 30 patch 2     loratadine (CLARITIN) 10 MG tablet TAKE 1 TABLET BY MOUTH EVERY DAY 90 tablet 3     methocarbamol (ROBAXIN) 750 MG tablet Take 1 tablet (750 mg) by mouth 3 times daily as needed for muscle spasms 90 tablet 3     metoprolol succinate ER (TOPROL XL) 50 MG 24 hr tablet Take 1 tablet (50 mg) by mouth 2 times daily 90 tablet 3     montelukast (SINGULAIR) 10 MG tablet TAKE 1 TABLET BY MOUTH EVERYDAY AT BEDTIME 90 tablet 0     NOVOLOG FLEXPEN 100 UNIT/ML soln INJECT 12 UNITS SUBCUTANEOUS 3 TIMES DAILY (WITH MEALS) 3 mL 1     oxyCODONE-acetaminophen (PERCOCET) 5-325 MG tablet Take 1 tablet by mouth 2 times daily as needed for pain May fill and start on 9/21/2022. For chronic  pain. 20 tablet 0     oxyCODONE-acetaminophen (PERCOCET) 7.5-325 MG per tablet Take 1 tablet by mouth 2 times daily as needed for severe pain (7-10) With at least 4 hours between doses. OK to fill and start 12/1/22 60 tablet 0     OZEMPIC, 1 MG/DOSE, 4 MG/3ML SOPN INJECT 1 MG SUBCUTANEOUS ONCE A WEEK 3 mL 2     pantoprazole (PROTONIX) 40 MG EC tablet TAKE 1 TABLET (40 MG) BY MOUTH DAILY (REPLACES FAMOTIDINE- SHOULD STOP TAKING FAMOTIDINE) 90 tablet 0     predniSONE (DELTASONE) 5 MG tablet Take 1 tablet (5 mg) by mouth daily 90 tablet 1     spironolactone (ALDACTONE) 25 MG tablet Take 2 tablets (50 mg) by mouth daily 180 tablet 3     torsemide (DEMADEX) 10 MG tablet TAKE ONE TAB (10 MG) WITH ONE 20 MG TAB TO = 30 MG DAILY IN AFTERNOON. 90 tablet 0     torsemide (DEMADEX) 20 MG tablet TAKE 2 TABLETS (40 MG) BY MOUTH EVERY MORNING AND 1 TABLET (20 MG) DAILY AT 2 PM. TAKE THE AFTERNOON DOSE WITH AN EXTRA 10 MG TAB FOR A TOTAL OF 30 MG IN THE AFTERNOON 270 tablet 3     traZODone (DESYREL) 50 MG tablet TAKE 3 TABLETS (150 MG) BY MOUTH NIGHTLY AS NEEDED FOR SLEEP 270 tablet 0     Diagnostic tests:  X-ray of right hip on 12/9/21 shows:  FINDINGS: AP and frog-leg lateral right hip views were obtained.      No acute osseous abnormality. Severe degenerative changes of the right  hip, progressed compared to prior. Vascular ossifications. Lumbar  spondylosis. Right SI joint degenerative change.        CSA and UDS due - 9/2023     Physical Exam:  Blood pressure 138/77, pulse 50, SpO2 98 %, not currently breastfeeding.  General: A&O x 3, in NAD, presents to clinic in a wheelchair  Gait: Not evaluated today  Pulm:   Breathing unlabored on room air      Alanna Alba MD  [facility]     BILLING TIME DOCUMENTATION:   The total TIME spent on this patient on the date of the encounter/appointment was 25 minutes.      TOTAL TIME includes:   Time spent preparing to see the patient (reviewing records and tests)  Time  spent face to face (or over the phone) with the patient  Time spent ordering tests, medications, procedures and referrals  Time spent documenting clinical information in Epic

## 2022-12-16 NOTE — PROGRESS NOTES
Clinic Care Coordination Contact  At Risk w/o Care Coordination Initial Contact     [facility]/Voicemail     Clinical Data: Care Coordinator Outreach  Outreach attempted x 1.  Left message on patient's friend's (Nghia) voicemail with call back information and requested return call.  Plan: Care Coordinator will try to reach patient again in 1-2 business days.    Addendum - RN CC received call from patient's friend Nghia Carlson in response to message left. She is taking patient to a doctor's appointment this afternoon. She will call back on Monday morning.     Veronika Swenson RN, BSN, CPHN, CM  [facility] Care Management  [facility]  Phone: [phone]  Email: [email or web address]

## 2022-12-16 NOTE — ADDENDUM NOTE
Addendum  created 12/16/22 1022 by Jerman Suggs MD    Child order released for a procedure order, Clinical Note Signed, Intraprocedure Blocks edited, Order Canceled from Note

## 2022-12-16 NOTE — ADDENDUM NOTE
Addendum  created 12/16/22 1021 by Jerman Suggs MD    Attestation recorded in Intraprocedure, Flowsheet accepted, Intraprocedure Attestations filed

## 2022-12-17 ENCOUNTER — TELEPHONE (OUTPATIENT)
Dept: RHEUMATOLOGY | Facility: CLINIC | Age: 82
End: 2022-12-17

## 2022-12-17 DIAGNOSIS — M10.9 GOUT, UNSPECIFIED CAUSE, UNSPECIFIED CHRONICITY, UNSPECIFIED SITE: Primary | ICD-10-CM

## 2022-12-17 RX ORDER — ACETAMINOPHEN 160 MG
100 TABLET,DISINTEGRATING ORAL DAILY
Qty: 180 CAPSULE | Refills: 0 | Status: SHIPPED | OUTPATIENT
Start: 2022-12-17 | End: 2023-01-19

## 2022-12-17 NOTE — TELEPHONE ENCOUNTER
"Reason for call:  Other   Patient called regarding (reason for call): Info  Additional comments: Betty is calling regarding the dosage for  \"ANAKINRIA\" and is wondering if she can increase the dosage.    Phone number to reach patient:  Home number on file [phone] (home)    Best Time:  ANY    Can we leave a detailed message on this number?  YES    Travel screening: Not Applicable    "

## 2022-12-17 NOTE — TELEPHONE ENCOUNTER
Doesn't look like labs were done recently, hopefully in near future, though.  Will send in refills for now.  90 day supply.  CW

## 2022-12-20 ENCOUNTER — VIRTUAL VISIT (OUTPATIENT)
Dept: PHARMACY | Facility: CLINIC | Age: 82
End: 2022-12-20
Payer: COMMERCIAL

## 2022-12-20 ENCOUNTER — PATIENT OUTREACH (OUTPATIENT)
Dept: CARE COORDINATION | Facility: CLINIC | Age: 82
End: 2022-12-20

## 2022-12-20 ENCOUNTER — TELEPHONE (OUTPATIENT)
Dept: FAMILY MEDICINE | Facility: CLINIC | Age: 82
End: 2022-12-20

## 2022-12-20 DIAGNOSIS — R52 PAIN: ICD-10-CM

## 2022-12-20 DIAGNOSIS — I50.32 CHRONIC HEART FAILURE WITH PRESERVED EJECTION FRACTION (H): ICD-10-CM

## 2022-12-20 DIAGNOSIS — M81.0 OSTEOPOROSIS, UNSPECIFIED OSTEOPOROSIS TYPE, UNSPECIFIED PATHOLOGICAL FRACTURE PRESENCE: ICD-10-CM

## 2022-12-20 DIAGNOSIS — I10 ESSENTIAL HYPERTENSION WITH GOAL BLOOD PRESSURE LESS THAN 140/90: ICD-10-CM

## 2022-12-20 DIAGNOSIS — Z78.9 TAKES DIETARY SUPPLEMENTS: ICD-10-CM

## 2022-12-20 DIAGNOSIS — D50.0 IRON DEFICIENCY ANEMIA DUE TO CHRONIC BLOOD LOSS: ICD-10-CM

## 2022-12-20 DIAGNOSIS — E11.65 TYPE 2 DIABETES MELLITUS WITH HYPERGLYCEMIA, WITH LONG-TERM CURRENT USE OF INSULIN (H): ICD-10-CM

## 2022-12-20 DIAGNOSIS — Z79.4 TYPE 2 DIABETES MELLITUS WITH HYPERGLYCEMIA, WITH LONG-TERM CURRENT USE OF INSULIN (H): ICD-10-CM

## 2022-12-20 DIAGNOSIS — N18.32 STAGE 3B CHRONIC KIDNEY DISEASE (H): ICD-10-CM

## 2022-12-20 DIAGNOSIS — M10.9 ACUTE GOUT OF FOOT, UNSPECIFIED CAUSE, UNSPECIFIED LATERALITY: ICD-10-CM

## 2022-12-20 DIAGNOSIS — I48.0 PAROXYSMAL ATRIAL FIBRILLATION (H): ICD-10-CM

## 2022-12-20 DIAGNOSIS — D50.9 IRON DEFICIENCY ANEMIA, UNSPECIFIED IRON DEFICIENCY ANEMIA TYPE: Primary | ICD-10-CM

## 2022-12-20 PROCEDURE — 99607 MTMS BY PHARM ADDL 15 MIN: CPT | Performed by: PHARMACIST

## 2022-12-20 PROCEDURE — 99606 MTMS BY PHARM EST 15 MIN: CPT | Performed by: PHARMACIST

## 2022-12-20 NOTE — TELEPHONE ENCOUNTER
Reason for Call:  Other appointment    Detailed comments: Patient had received call that she should make a Annual wellness appointment. They thought that meant that she should come in soon. Next available appointment with PCP Dr Bran is 3/21/23. Please call Nghia if anything opens up sooner.    Phone Number Patient can be reached at: Cell number on file:    Telephone Information:   Mobile [phone]       Best Time: anytime    Can we leave a detailed message on this number? Not Applicable    Call taken on 12/20/2022 at 9:53 AM by Nadine Ruiz

## 2022-12-20 NOTE — LETTER
[facility] CARE COORDINATION  [address]  [place] [address]    December 20, 2022    Betty Tee  [address]  [place] [address]      Dear Betty (Nghia),        I am a clinic care coordinator who works with Ilene Tristan MD with the [facility]. I wanted to introduce myself and provide you with my contact information for you to be able to call me with any questions or concerns. I wanted to thank you for spending the time to talk with me.  Below is a description of clinic care coordination and how I can further assist you.       The clinic care coordination team is made up of a registered nurse, , financial resource worker and community health worker who understand the health care system. The goal of clinic care coordination is to help you manage your health and improve access to the health care system. Our team works alongside your provider to assist you in determining your health and social needs. We can help you obtain health care and community resources, providing you with necessary information and education. We can work with you through any barriers and develop a care plan that helps coordinate and strengthen the communication between you and your care team.    Please feel free to contact me with any questions or concerns regarding care coordination and what we can offer.      We are focused on providing you with the highest-quality healthcare experience possible.    Sincerely,     Veronika Swenson RN, BSN, CPHN, CM  [facility] Ambulatory Care Management  [facility]   Phone: [phone]  Email: [email or web address]

## 2022-12-20 NOTE — PROGRESS NOTES
Clinic Care Coordination Contact   Initial Outreach     Assessment: RN CC contacted patient's friend Nghia Carlson ([phone]) to discuss Care Coordination. Nghia had not returned call from last week. Nghia organizes her medications, dispenses and makes sure she has taken them. Betty has been struggling with Anemia - 4 endoscopies w/ablations. She is getting ready for hip replacement. Pulmonary gave ok for SRI surgery. She had a Watchman procedure back in March 2022. She also has Sjogren's - follows with  Rheumatology Dr. Lopez.   Nghia will make appointment for MAWV and DM foot exam. Nghia is agreeable to RN CC sending introduction letter. Nghia has access to patient's MyChart.       Patient accepts CC: No, Friend Nghia Carlson organizes all her appointments, goes with patient, documents visits. . Patient will be sent Care Coordination introduction letter for future reference.     Care coordinator will do no further outreaches.     Veronika Swenson RN, BSN, CPHN, CM  [facility] Ambulatory Care Management  [facility]  Phone: [phone]  Email: [email or web address]

## 2022-12-20 NOTE — Clinical Note
Hi There - You are seeing Sister Sita Thusday (likely today by the time you read this :) ) - let me know if I can help in any way!  I've known her for a long time, and her PCP is great, but on vacation until end of next week.  Sabrina Meek, PharmD, JAMES, BCACP MTM Pharmacist, [facility]

## 2022-12-20 NOTE — PROGRESS NOTES
Medication Therapy Management (MTM) Encounter    ASSESSMENT:                            Medication Adherence/Access: No issues identified    Gout: Anakinra was helpful over the weekend.  She is meeting with rheumatology MTM pharmacist on Thursday for additional help and recommendations.    GI bleeding/Vitamin deficiency/Anemia: She is hesitant to do injections to treat both iron deficiency anemia and osteoporosis but will consider it after the first of the year.  Her creatinine clearance is still above 30 so she is a candidate for Reclast, this would align with her infusion concerns given the lower injection burden compared to Prolia.  Iron studies have not been checked since July 2022 and should be repeated prior to any iron infusions.  Hemoglobin is ordered and will be checked with her next set of labs.  Given that she is planning on having surgery in the next few months guidelines would indicate that IV iron replacement is more effective.    Type 2 Diabetes: Last A1c in November was still well below her goal of less than 8%.  Hypoglycemia has improved and she is tolerating therapy without side effects.  She expresses some injection burden, can work with her to see if we can reduce the frequency of NovoLog injections.  As noted she would be a good candidate for Jardiance but has not tolerated this well in the past.  Additionally Jardiance should be held before surgery and knowing that she is likely going to have a hip replacement in the very near future I am hesitant to start an SGLT2 today.     HTN/CKD/CHF/Afib: Tolerating medications well at this point.  See note above about SGLT2i.     Osteoporosis: As noted above she would be a candidate for Reclast.  Her last vitamin D check was within normal limits, as well as calcium.  Parathyroid hormone will be done with her next set of labs.    Hip Pain: Plan in place to meet with Ortho team regarding surgery.    PLAN:                            1,  Sabrina will work on  "setting up an infusion of Reclast at the [facility] in January.  This is a once a year infusion to protect your bones and help keep them strong.  It will replace the once weekly pill that you take called alendronate.  You only need to get that infusion once per year.    2.  An infusion of iron was also recommended by the doctor that day to work endoscopy.  However I think we should take a look at your iron stores and your hemoglobin before we consider starting an infusion of iron.  If we do decide to start an iron infusion that means you would not have to take iron pills any longer.  Usually you only need 1 or 2 iron infusions.  We will want to make sure that you are hemoglobin comes up and that your iron deficiency is corrected so that it does not delay your hip surgery.    Follow-up: I will be in touch with next steps on the infusion for osteoporosis (Reclast).  I have ordered the labs that you need to be drawn with your next lab draw.  If you have any questions please feel free to reach out!    SUBJECTIVE/OBJECTIVE:                          Betty Tee is a 82 year old female contacted via secure video for a follow-up visit.  Today's visit is a follow-up [facility] visit from 9/6/22. She is joined by her friend, Nghia, today.     Reason for visit: follow-up on blood sugars. Dr. Tristan specifically asked us to review treatment for osteoporosis and iron infusions in light of the results of her most recent EGD.    Allergies/ADRs: Reviewed in chart  Past Medical History: Reviewed in chart  Tobacco: She reports that she quit smoking about 11 years ago. Her smoking use included cigarettes. She has a 5.00 pack-year smoking history. She has never used smokeless tobacco.  Alcohol: not currently using    Medication Adherence/Access: Does not express significant concerns with actual adherence or access to medications but notes that she took 5 injections this morning and the injections are becoming overwhelming.    Gout: \"Bout with " "gout\" over the weekend. Very painful for her. Dr. Lopez prescribed \"injections\" (anakinra) that she had in her fridge. Betty remembered this about 1/2 way through the first night of symptoms and then took it.  She states it started to help fairly quickly and as of today has taken three doses and pain has greatly improved.  She initially called Dr. Lopez's office to ask if her dose could be increased however she no longer feels that this is necessary.  Opioids were not really helpful.    GI bleeding/Vitamin deficiency/Anemia: Describes her most recent procedure as the \"endoscopy from hell.\"  Provider lacked a helpful bedside manner.  She reports that he told her that she had \"peeling in the esophagus.\"  She is currently taking pantoprazole 1 tablet daily, vitamin b-12 1 tablet daily and ferrous sulfate 325mg daily reports no side effects..  She denies GI symptoms at this time.  Is difficult to determine if she has blood loss with stooling as her stools are dark due to iron usage.  When discussing the idea of an infusion for both iron replacement and osteoporosis treatment she expresses that she already took a lot of injections today and is not anxious to start another injection.    Estimated Creatinine Clearance: 44 mL/min (A) (based on SCr of 1.09 mg/dL (H)).     Type 2 Diabetes:  Currently taking Basaglar 24 units daily, Novolog 8 units 3 times daily (before meals), Ozempic 1 mg weekly. Patient is not experiencing side effects.  She did not tolerate previous trials of Jardiance due to significant increase in serum creatinine.  Blood sugar monitoring: 3 time(s) daily. Ranges (based on fasting glucometer readings): See below  Mornin this morning, this is a pretty normal reading for her 120-130's.   Afternoon: little higher than in the morning 170-180s  Evenin-140s  Since adjustment to her Novolog dose at last [facility] visit she has not had any hypoglycemic episodes.  He also denies signs and symptoms of " hyperglycemia.  Eye exam: up to date  Foot exam: due  Diet/Exercise: Has been working on adding more protein to her meals, she has been limited in exercise due to hip pain.   Aspirin: Taking aspirin 81 mg once daily and Plavix 75 mg once daily.  Statin: Yes: atorvastatin 5 mg daily  - last lipids at goal, tolerating well.   ACEi/ARB: No- stopped due to low BP by cardiology in 2019.  Urine albumin remains elevated at 50 at last check, but is actually improved from previous measurements over 100.  Unable to tolerate SGLT2i due to inc in SCr/decrease in eGFR     HTN/CKD/CHF/Afib: Watchman procedure on 9/26/2022, and she has subsequently stopped taking Xarelto 20 mg daily in favor of Plavix and aspirin.  Additionally she is taking potassium 40meq in the AM and 20meq in the PM, metopolol succinate 50mg twice daily, spironolactone 50mg daily, torsemide 40mg in the AM and 30mg in the afternoon.     Osteoporosis: Stable. Currently taking vitamin D3 4000 units daily and alendronate 70 mg weekly.  As noted above she is not excited about starting an infusion for osteoporosis, even if it is yearly.  We did discuss that this would likely be safer for her in the long run and she is more open to this but would like to delay any changes until after the holiday season.    Hip Pain: Currently taking oxycodone-acetaminophen 7.5-325mg as needed (no more than 2 tablets in 1 day).  She does not find this particularly helpful.  She has worked with her pain provider to switch between hydrocodone and oxycodone both with acetaminophen but neither have really helped. Has tried medical marijuana topically in the past but doesn't remember it being effective. Methocarbamol, lidocaine patches, and acetaminophen are not helpful for pain.   Pain is worse when walking and moving, reports no pain when sitting.   Will be meeting with Dr. Shannon on January 5 to discuss hip replacement surgery.  ----------------  I spent 21 minutes with this patient  today. All changes were made via collaborative practice agreement with Ilene Tristan MD. A copy of the visit note was provided to the patient's provider(s).    A summary of these recommendations was sent via ITYZ.    Marcos NicoleD, JAMES, BCACP  MTM Pharmacist, [facility]     Telemedicine Visit Details  Type of service:  Video Conference via TextPayMe  Start Time: 11:34 AM  End Time: 11:55 AM  Originating Location (pt. Location): Home      Distant Location (provider location):  On-site  Provider has received verbal consent for a visit from the patient? Yes     Medication Therapy Recommendations  Iron deficiency anemia due to chronic blood loss    Current Medication: ferrous gluconate (FERGON) 324 (38 Fe) MG tablet   Rationale: Medication requires monitoring - Needs additional monitoring - Effectiveness   Recommendation: Order Lab   Status: Accepted per CPA         Osteoporosis, unspecified osteoporosis type, unspecified pathological fracture presence    Current Medication: alendronate (FOSAMAX) 70 MG tablet   Rationale: Undesirable effect - Adverse medication event - Safety   Recommendation: Change Medication - zoledronic Acid 5 MG/100ML Soln infusion   Status: Accepted per CPA

## 2022-12-22 ENCOUNTER — VIRTUAL VISIT (OUTPATIENT)
Dept: PHARMACY | Facility: CLINIC | Age: 82
End: 2022-12-22
Attending: INTERNAL MEDICINE
Payer: COMMERCIAL

## 2022-12-22 DIAGNOSIS — M10.9 ACUTE GOUT OF FOOT, UNSPECIFIED CAUSE, UNSPECIFIED LATERALITY: Primary | ICD-10-CM

## 2022-12-22 PROCEDURE — 99607 MTMS BY PHARM ADDL 15 MIN: CPT

## 2022-12-22 PROCEDURE — 99606 MTMS BY PHARM EST 15 MIN: CPT

## 2022-12-22 RX ORDER — ALLOPURINOL 100 MG/1
50 TABLET ORAL DAILY
Qty: 15 TABLET | Refills: 3 | Status: SHIPPED | OUTPATIENT
Start: 2022-12-22 | End: 2023-01-13

## 2022-12-22 RX ORDER — ANAKINRA 100 MG/.67ML
INJECTION, SOLUTION SUBCUTANEOUS
Qty: 6.7 ML | Refills: 3 | Status: SHIPPED | OUTPATIENT
Start: 2022-12-22 | End: 2023-09-26

## 2022-12-22 NOTE — Clinical Note
We are starting her on allopurinol - just an fyi for when you follow up with her. She and her friend Nghia are really funny!  Thanks! Suzanne

## 2022-12-22 NOTE — PATIENT INSTRUCTIONS
"Recommendations from today's MTM visit:                                                       1. Start allopurinol 50 mg daily for prevention of gout.    2. Complete a uric acid lab in 4 weeks. Please call and schedule a lab appointment to complete this.    3. I will send in a refill of the Kineret since what you have at home is . [facility] will call you when this is ready to ship.    4. If you have another severe gout flare and cannot reach the rheumatology office you can safely take Kineret twice daily until receiving further instructions.    Follow-up: Return in about 4 weeks (around 2023), or once uric acid labs are complete, for MTM Pharmacist Visit.    It was great speaking with you today.  I value your experience and would be very thankful for your time in providing feedback in our clinic survey. In the next few days, you may receive an email or text message from [employer] with a link to a survey related to your  clinical pharmacist.\"     To schedule another MTM appointment, please call the clinic directly or you may call the MTM scheduling line at [phone] or toll-free at [phone].     My Clinical Pharmacist's contact information:                                                      Please feel free to contact me with any questions or concerns you have.      Suzanne Whitman, PharmD  Medication Therapy Management Pharmacist  [facility]  Phone: [phone]     "

## 2022-12-22 NOTE — PROGRESS NOTES
Medication Therapy Management (MTM) Encounter    ASSESSMENT:                            Medication Adherence/Access: See below for considerations    Gout: Per 2020 ACR guidelines, patient should be started on urate lowering therapy due to recurrent gout flare and stage 3 CKD. Allopurinol is appropriate initial URT option. Would benefit from starting allopurinol 50 mg daily due to GFR < 60 mL/min. Recommend repeat uric acid monitoring in 4 weeks and increasing to 100 mg daily if uric acid > 6 mg/mL. Provided education on Kineret today including dosing, mechanism, side effects (both common/serious), precautions, monitoring and time to efficacy. Reviewed Kineret twice daily dosing can be used for gout treatment if daily dosing is not managing flare and patient is unable to reach the rheumatology office. Discussed reasons not to take  injections including off-color or particles in liquid medication and improper storage. Patient confident none of these were present prior to taking injections, therefore recently used injections were likely safe. Plan to send in Kineret refill to replace  on hand medication.    PLAN:                            1. Start allopurinol 50 mg daily for prevention of gout.    2. Complete a uric acid lab in 4 weeks. Please call and schedule a lab appointment to complete this.    3. I will send in a refill of the Kineret since what you have at home is . [facility] will call you when this is ready to ship.    4. If you have another severe gout flare and cannot reach the rheumatology office you can safely take Kineret twice daily until receiving further instructions.    Follow-up: Return in about 4 weeks (around 2023), or once uric acid labs are complete, for MTM Pharmacist Visit.    SUBJECTIVE/OBJECTIVE:                          Betty Tee is a 82 year old female contacted via secure video for an initial visit. She was referred to me from [facility]  "MD John. Patient was accompanied by her friend Nghia english.     Reason for visit: Management of recent gout flare    Allergies/ADRs: Reviewed in chart  Past Medical History: Reviewed in chart  Tobacco: She reports that she quit smoking about 11 years ago. Her smoking use included cigarettes. She has a 5.00 pack-year smoking history. She has never used smokeless tobacco.  Alcohol: not currently using    Medication Adherence/Access: Notes the Kineret she has on hand is likely . Can't see the expiration date but knows she has had the medication over a year since it has been in her fridge since her last flare. Worried that what she took may have been dangerous.  The patient fills medications at [place]: NO, fills medications at [facility] in [facility].    Gout: Has Kineret 100 mg x 3 doses on hand for gout flares. Reports she had a gout flare that started Saturday morning around 2-3 am on her left foot. Notes it felt like all toe joints were affected. Started taking Kineret Saturday around 9 am and completed all 3 doses. Last gout flare was 2021 on the right foot. Feels flare has mostly resolved - still noticing some swelling and \"tightness\" of her skin around joints. Has several questions about Kineret mechanism and dosing, and prevention treatment for future gout flares. Specifically would like to know if Kineret twice daily is safe to take.    Uric Acid   Date Value Ref Range Status   2022 7.7 (H) 2.6 - 6.0 mg/dL Final   2021 6.2 (H) 2.6 - 6.0 mg/dL Final     Today's Vitals: There were no vitals taken for this visit.  ----------------    I spent 38 minutes with this patient today. All changes were made via collaborative practice agreement with Zulma Lopez. A copy of the visit note was provided to the patient's provider(s).    A summary of these recommendations was sent via RepRegen.    Suzanne Whitman, PharmD  Medication Therapy Management Pharmacist  [facility] Rheumatology " Clinic  Phone: [phone]    Telemedicine Visit Details  Type of service:  Video Conference via [email or web address]  Start Time: 12:30 PM  End Time: 1:08 PM  Originating Location (pt. Location): Home  Distant Location (provider location):  Off-site  Provider has received verbal consent for a visit from the patient? Yes     Medication Therapy Recommendations  Acute gout of foot, unspecified cause, unspecified laterality    Current Medication: allopurinol (ZYLOPRIM) 100 MG tablet   Rationale: Medication requires monitoring - Needs additional monitoring - Effectiveness   Recommendation: Order Lab - Uric acid in 4 weeks   Status: Accepted per CPA          Current Medication: anakinra (KINERET) 100 MG/0.67ML SOSY injection   Rationale: Synergistic therapy - Needs additional medication therapy - Indication   Recommendation: Start Medication - allopurinol 50 mg Tabs half-tab - Daily   Status: Accepted per CPA          Current Medication: anakinra (KINERET) 100 MG/0.67ML SOSY injection (Discontinued)   Rationale: Dose too low - Dosage too low - Effectiveness   Recommendation: Increase Frequency - 100 mg daily unless in severe pain and unable to reach rheumatology then 100 mg twice daily for flare management   Status: Accepted per CPA

## 2022-12-22 NOTE — PATIENT INSTRUCTIONS
"Recommendations from today's MTM visit:                                                       1,  Sabrina will work on setting up an infusion of Reclast at the [facility] in January.  This is a once a year infusion to protect your bones and help keep them strong.  It will replace the once weekly pill that you take called alendronate.  You only need to get that infusion once per year.    2.  An infusion of iron was also recommended by the doctor that day to work endoscopy.  However I think we should take a look at your iron stores and your hemoglobin before we consider starting an infusion of iron.  If we do decide to start an iron infusion that means you would not have to take iron pills any longer.  Usually you only need 1 or 2 iron infusions.  We will want to make sure that you are hemoglobin comes up and that your iron deficiency is corrected so that it does not delay your hip surgery.    Follow-up: I will be in touch with next steps on the infusion for osteoporosis (Reclast).  I have ordered the labs that you need to be drawn with your next lab draw.  If you have any questions please feel free to reach out!    It was great speaking with you today.  I value your experience and would be very thankful for your time in providing feedback in our clinic survey. In the next few days, you may receive an email or text message from [email or web address] with a link to a survey related to your  clinical pharmacist.\"     To schedule another MTM appointment, please call the clinic directly or you may call the MTM scheduling line at [phone] or toll-free at [phone].     My Clinical Pharmacist's contact information:                                                      Please feel free to contact me with any questions or concerns you have.      Sabrina Meek, Pharm.D., M.B.A., BCACP  MTM Pharmacist, [facility]  Pager: [phone]  Email: [email or web address]      "

## 2022-12-23 ENCOUNTER — TELEPHONE (OUTPATIENT)
Dept: RHEUMATOLOGY | Facility: CLINIC | Age: 82
End: 2022-12-23

## 2022-12-23 ENCOUNTER — TELEPHONE (OUTPATIENT)
Dept: PHARMACY | Facility: CLINIC | Age: 82
End: 2022-12-23

## 2022-12-23 NOTE — TELEPHONE ENCOUNTER
PA Initiation    Medication: Jae JOSEPH Renewal  Insurance Company: [employer] Part D - Phone [phone] Fax [phone]  Pharmacy Filling the Rx:    Filling Pharmacy Phone:    Filling Pharmacy Fax:    Start Date: 12/23/2022    Key: [id number]

## 2022-12-24 DIAGNOSIS — M35.02 SJOGREN'S SYNDROME WITH LUNG INVOLVEMENT (H): ICD-10-CM

## 2022-12-26 NOTE — TELEPHONE ENCOUNTER
Prior Authorization Approval    Authorization Effective Date: 9/24/2022  Authorization Expiration Date: 12/23/2023  Medication: Jae JOSEPH Renewal- Approved  Approved Dose/Quantity: one month supply  Reference #: Key: [id number]   Insurance Company: [employer] Part D - Phone [phone] Fax [phone]  Expected CoPay:       CoPay Card Available:      Foundation Assistance Needed:    Which Pharmacy is filling the prescription (Not needed for infusion/clinic administered):    Pharmacy Notified: Yes  Patient Notified: Yes

## 2022-12-28 RX ORDER — CEVIMELINE HYDROCHLORIDE 30 MG/1
30 CAPSULE ORAL 3 TIMES DAILY
Qty: 270 CAPSULE | Refills: 1 | Status: SHIPPED | OUTPATIENT
Start: 2022-12-28 | End: 2023-01-26

## 2022-12-28 NOTE — TELEPHONE ENCOUNTER
CEVIMELINE HCL 30 MG CAPSULE  Last Written Prescription Date:   5/18/2021  Last Fill Quantity: 270,   # refills: 2  Last Office Visit :  7/8/2022  Future Office visit:   1/13/2023    Routing refill request to provider for review/approval because:  Gaps in refills.  Last filled 5/18/2021  Refer to clinic for review and refills per Providers orders.        Mere Weinberg RN  Central Triage Red Flags/Med Refills

## 2023-01-03 DIAGNOSIS — Z79.01 LONG TERM CURRENT USE OF ANTICOAGULANT THERAPY: ICD-10-CM

## 2023-01-03 DIAGNOSIS — E78.5 HYPERLIPIDEMIA LDL GOAL <100: ICD-10-CM

## 2023-01-03 DIAGNOSIS — M10.9 GOUT, UNSPECIFIED CAUSE, UNSPECIFIED CHRONICITY, UNSPECIFIED SITE: ICD-10-CM

## 2023-01-03 DIAGNOSIS — I48.0 PAROXYSMAL ATRIAL FIBRILLATION (H): ICD-10-CM

## 2023-01-03 DIAGNOSIS — F33.1 MAJOR DEPRESSIVE DISORDER, RECURRENT EPISODE, MODERATE (H): Primary | ICD-10-CM

## 2023-01-03 DIAGNOSIS — M35.02 SJOGREN'S SYNDROME WITH LUNG INVOLVEMENT (H): ICD-10-CM

## 2023-01-03 DIAGNOSIS — K31.819 GAVE (GASTRIC ANTRAL VASCULAR ECTASIA): ICD-10-CM

## 2023-01-03 DIAGNOSIS — J44.89 FOLLICULAR BRONCHIOLITIS (H): ICD-10-CM

## 2023-01-03 DIAGNOSIS — I10 ESSENTIAL HYPERTENSION WITH GOAL BLOOD PRESSURE LESS THAN 140/90: ICD-10-CM

## 2023-01-03 DIAGNOSIS — E11.65 TYPE 2 DIABETES MELLITUS WITH HYPERGLYCEMIA, WITH LONG-TERM CURRENT USE OF INSULIN (H): ICD-10-CM

## 2023-01-03 DIAGNOSIS — Z86.718 HISTORY OF DEEP VEIN THROMBOSIS (DVT) OF LOWER EXTREMITY: ICD-10-CM

## 2023-01-03 DIAGNOSIS — I51.7 LEFT VENTRICULAR HYPERTROPHY: ICD-10-CM

## 2023-01-03 DIAGNOSIS — M16.11 OSTEOARTHRITIS OF RIGHT HIP, UNSPECIFIED OSTEOARTHRITIS TYPE: ICD-10-CM

## 2023-01-03 DIAGNOSIS — G47.00 INSOMNIA, UNSPECIFIED TYPE: ICD-10-CM

## 2023-01-03 DIAGNOSIS — J84.9 ILD (INTERSTITIAL LUNG DISEASE) (H): ICD-10-CM

## 2023-01-03 DIAGNOSIS — N18.32 STAGE 3B CHRONIC KIDNEY DISEASE (H): ICD-10-CM

## 2023-01-03 DIAGNOSIS — Z79.4 TYPE 2 DIABETES MELLITUS WITH HYPERGLYCEMIA, WITH LONG-TERM CURRENT USE OF INSULIN (H): ICD-10-CM

## 2023-01-03 DIAGNOSIS — K21.9 GASTROESOPHAGEAL REFLUX DISEASE WITHOUT ESOPHAGITIS: ICD-10-CM

## 2023-01-04 NOTE — TELEPHONE ENCOUNTER
Routing refill request to provider for review/approval because:  Drug interaction warning: Lexapro and Trazodone  Future OV: 3/21/23  Thanks,  Elise RIVERA RN

## 2023-01-05 DIAGNOSIS — M16.11 OSTEOARTHRITIS OF RIGHT HIP, UNSPECIFIED OSTEOARTHRITIS TYPE: ICD-10-CM

## 2023-01-05 PROBLEM — M10.9 GOUT, UNSPECIFIED CAUSE, UNSPECIFIED CHRONICITY, UNSPECIFIED SITE: Status: ACTIVE | Noted: 2023-01-05

## 2023-01-05 PROBLEM — Z79.01 LONG TERM CURRENT USE OF ANTICOAGULANT THERAPY: Status: ACTIVE | Noted: 2017-10-05

## 2023-01-05 PROBLEM — E78.5 HYPERLIPIDEMIA LDL GOAL <100: Status: ACTIVE | Noted: 2017-05-22

## 2023-01-05 PROBLEM — K31.819 GAVE (GASTRIC ANTRAL VASCULAR ECTASIA): Status: ACTIVE | Noted: 2022-11-30

## 2023-01-05 RX ORDER — ESCITALOPRAM OXALATE 20 MG/1
TABLET ORAL
Qty: 90 TABLET | Refills: 0 | Status: SHIPPED | OUTPATIENT
Start: 2023-01-05 | End: 2023-04-04

## 2023-01-05 NOTE — TELEPHONE ENCOUNTER
Routed to MA pool to gather required information for opioid refill.      Claudia GREENN, RN Care Coordinator  [facility]  Pain [facility]

## 2023-01-05 NOTE — TELEPHONE ENCOUNTER
[facility] Call Center    Phone Message    May a detailed message be left on voicemail: yes     Reason for Call: Medication Refill Request    Has the patient contacted the pharmacy for the refill? Yes   Name of medication being requested: oxyCODONE-acetaminophen (PERCOCET) 7.5-325 MG per tablet  Provider who prescribed the medication: Dr. Alba  Pharmacy: [facility]/PHARMACY #[id number] - [place], [place] - [address]  Date medication is needed: 1/6/22     Betty has 3 tablets left and was informed of the 7 day refill policy.    Action Taken: Message routed to:  Other: [facility] PAIN MANAGEMENT    Travel Screening: Not Applicable

## 2023-01-05 NOTE — TELEPHONE ENCOUNTER
Lexapro rx sent with #90- appt scheduled in 3/23.  Reviewed problem list and updated multiple issues, specialists, medications, plans.  CW

## 2023-01-05 NOTE — TELEPHONE ENCOUNTER
Patient requesting refill(s) of  oxyCODONE-acetaminophen (PERCOCET) 7.5-325 MG per tablet    Last dispensed from pharmacy on 12/2/22    Patient's last office/virtual visit by prescribing provider on 12/06/22  Next office/virtual appointment scheduled for 03/20/23    Last urine drug screen date 9/22/22  Current opioid agreement on file (completed within the last year) Yes Date of opioid agreement: 10/12/22    E-prescribe to [facility]/PHARMACY #[id number] - [place], [place] -    Will route to nursing [place] for review and preparation of prescription(s).

## 2023-01-06 RX ORDER — OXYCODONE AND ACETAMINOPHEN 7.5; 325 MG/1; MG/1
1 TABLET ORAL 2 TIMES DAILY PRN
Qty: 60 TABLET | Refills: 0 | Status: CANCELLED | OUTPATIENT
Start: 2023-01-06

## 2023-01-06 RX ORDER — OXYCODONE AND ACETAMINOPHEN 7.5; 325 MG/1; MG/1
1 TABLET ORAL 2 TIMES DAILY PRN
Qty: 60 TABLET | Refills: 0 | Status: SHIPPED | OUTPATIENT
Start: 2023-01-06 | End: 2023-02-17

## 2023-01-06 NOTE — TELEPHONE ENCOUNTER
Routing to provider to review medication prepped per below      Percocet 7.5-325, #60, Refill:no  Sig:With at least 4 hours between doses. OK to fill and start 01/06/23  Last picked up 12/02/22 with start on 12/01/22  Due Anytime    Per last OV note 12/16/22:    1. Medication Management:   1. Continue Percocet 7.5-325 mg BID prn. She reports minimal pain relief but does not want to make changes at this time. Can consider increase to 10 mg BID prn if pain becomes too severe.      Claudia LANDON, RN Care Coordinator  [facility]  Pain Management

## 2023-01-09 NOTE — TELEPHONE ENCOUNTER
Orders Placed This Encounter   Medications     oxyCODONE-acetaminophen (PERCOCET) 7.5-325 MG per tablet     Sig: Take 1 tablet by mouth 2 times daily as needed for severe pain (7-10) With at least 4 hours between doses. OK to fill and start 1/6/23     Dispense:  60 tablet     Refill:  0

## 2023-01-13 ENCOUNTER — OFFICE VISIT (OUTPATIENT)
Dept: RHEUMATOLOGY | Facility: CLINIC | Age: 83
End: 2023-01-13
Attending: INTERNAL MEDICINE
Payer: MEDICARE

## 2023-01-13 ENCOUNTER — LAB (OUTPATIENT)
Dept: LAB | Facility: CLINIC | Age: 83
End: 2023-01-13
Payer: MEDICARE

## 2023-01-13 VITALS
WEIGHT: 178 LBS | SYSTOLIC BLOOD PRESSURE: 130 MMHG | OXYGEN SATURATION: 97 % | BODY MASS INDEX: 27.94 KG/M2 | HEIGHT: 67 IN | HEART RATE: 55 BPM | DIASTOLIC BLOOD PRESSURE: 63 MMHG

## 2023-01-13 DIAGNOSIS — M10.9 GOUT, UNSPECIFIED CAUSE, UNSPECIFIED CHRONICITY, UNSPECIFIED SITE: ICD-10-CM

## 2023-01-13 DIAGNOSIS — D64.9 ANEMIA, UNSPECIFIED TYPE: ICD-10-CM

## 2023-01-13 DIAGNOSIS — M35.02 SJOGREN'S SYNDROME WITH LUNG INVOLVEMENT (H): Primary | ICD-10-CM

## 2023-01-13 DIAGNOSIS — M35.02 SJOGREN'S SYNDROME WITH LUNG INVOLVEMENT (H): ICD-10-CM

## 2023-01-13 DIAGNOSIS — M10.9 ACUTE GOUT OF FOOT, UNSPECIFIED CAUSE, UNSPECIFIED LATERALITY: ICD-10-CM

## 2023-01-13 DIAGNOSIS — D50.9 IRON DEFICIENCY ANEMIA, UNSPECIFIED IRON DEFICIENCY ANEMIA TYPE: ICD-10-CM

## 2023-01-13 LAB
ALBUMIN SERPL BCG-MCNC: 4.1 G/DL (ref 3.5–5.2)
ALT SERPL W P-5'-P-CCNC: 12 U/L (ref 10–35)
AST SERPL W P-5'-P-CCNC: 21 U/L (ref 10–35)
BASOPHILS # BLD AUTO: 0.1 10E3/UL (ref 0–0.2)
BASOPHILS NFR BLD AUTO: 1 %
CREAT SERPL-MCNC: 1.04 MG/DL (ref 0.51–0.95)
CRP SERPL-MCNC: 13.4 MG/L
EOSINOPHIL # BLD AUTO: 0.1 10E3/UL (ref 0–0.7)
EOSINOPHIL NFR BLD AUTO: 1 %
ERYTHROCYTE [DISTWIDTH] IN BLOOD BY AUTOMATED COUNT: 12.9 % (ref 10–15)
ERYTHROCYTE [SEDIMENTATION RATE] IN BLOOD BY WESTERGREN METHOD: 19 MM/HR (ref 0–30)
FERRITIN SERPL-MCNC: 131 NG/ML (ref 11–328)
GFR SERPL CREATININE-BSD FRML MDRD: 53 ML/MIN/1.73M2
HCT VFR BLD AUTO: 39.7 % (ref 35–47)
HGB BLD-MCNC: 12.8 G/DL (ref 11.7–15.7)
IMM GRANULOCYTES # BLD: 0.1 10E3/UL
IMM GRANULOCYTES NFR BLD: 1 %
IRON BINDING CAPACITY (ROCHE): 236 UG/DL (ref 240–430)
IRON SATN MFR SERPL: 74 % (ref 15–46)
IRON SERPL-MCNC: 175 UG/DL (ref 37–145)
LYMPHOCYTES # BLD AUTO: 0.8 10E3/UL (ref 0.8–5.3)
LYMPHOCYTES NFR BLD AUTO: 8 %
MCH RBC QN AUTO: 28.8 PG (ref 26.5–33)
MCHC RBC AUTO-ENTMCNC: 32.2 G/DL (ref 31.5–36.5)
MCV RBC AUTO: 89 FL (ref 78–100)
MONOCYTES # BLD AUTO: 0.7 10E3/UL (ref 0–1.3)
MONOCYTES NFR BLD AUTO: 7 %
NEUTROPHILS # BLD AUTO: 8 10E3/UL (ref 1.6–8.3)
NEUTROPHILS NFR BLD AUTO: 82 %
NRBC # BLD AUTO: 0 10E3/UL
NRBC BLD AUTO-RTO: 0 /100
PLATELET # BLD AUTO: 237 10E3/UL (ref 150–450)
RBC # BLD AUTO: 4.44 10E6/UL (ref 3.8–5.2)
URATE SERPL-MCNC: 6.2 MG/DL (ref 2.4–5.7)
VIT B12 SERPL-MCNC: 1797 PG/ML (ref 232–1245)
WBC # BLD AUTO: 9.7 10E3/UL (ref 4–11)

## 2023-01-13 PROCEDURE — 99214 OFFICE O/P EST MOD 30 MIN: CPT | Performed by: INTERNAL MEDICINE

## 2023-01-13 PROCEDURE — 82040 ASSAY OF SERUM ALBUMIN: CPT | Performed by: PATHOLOGY

## 2023-01-13 PROCEDURE — 36415 COLL VENOUS BLD VENIPUNCTURE: CPT | Performed by: PATHOLOGY

## 2023-01-13 PROCEDURE — 84550 ASSAY OF BLOOD/URIC ACID: CPT | Performed by: FAMILY MEDICINE

## 2023-01-13 PROCEDURE — 84450 TRANSFERASE (AST) (SGOT): CPT | Performed by: PATHOLOGY

## 2023-01-13 PROCEDURE — 84460 ALANINE AMINO (ALT) (SGPT): CPT | Performed by: PATHOLOGY

## 2023-01-13 PROCEDURE — 82607 VITAMIN B-12: CPT | Performed by: FAMILY MEDICINE

## 2023-01-13 PROCEDURE — 86140 C-REACTIVE PROTEIN: CPT | Performed by: PATHOLOGY

## 2023-01-13 PROCEDURE — 83550 IRON BINDING TEST: CPT | Performed by: FAMILY MEDICINE

## 2023-01-13 PROCEDURE — 82728 ASSAY OF FERRITIN: CPT | Performed by: FAMILY MEDICINE

## 2023-01-13 PROCEDURE — 82565 ASSAY OF CREATININE: CPT | Performed by: PATHOLOGY

## 2023-01-13 PROCEDURE — 85025 COMPLETE CBC W/AUTO DIFF WBC: CPT | Performed by: PATHOLOGY

## 2023-01-13 PROCEDURE — 85652 RBC SED RATE AUTOMATED: CPT | Performed by: PATHOLOGY

## 2023-01-13 PROCEDURE — G0463 HOSPITAL OUTPT CLINIC VISIT: HCPCS | Performed by: INTERNAL MEDICINE

## 2023-01-13 RX ORDER — ALLOPURINOL 100 MG/1
100 TABLET ORAL DAILY
Qty: 90 TABLET | Refills: 1 | Status: SHIPPED | OUTPATIENT
Start: 2023-01-13 | End: 2023-07-16

## 2023-01-13 ASSESSMENT — PAIN SCALES - GENERAL: PAINLEVEL: NO PAIN (0)

## 2023-01-13 NOTE — PROGRESS NOTES
Rheumatology Clinic In Person Follow up Visit Note  Zulma Lopez MD  DOS: 2023  Date of last visit: 2022    Name: Betty eTe  MRN: [id number]  Age: 82 year old  : 1940  Reason for visit: Follow up visit for R hip pain sec severe OA, PMR, presumed gout flare of L foot, primary Sjogren's syndrome    # Sjogren's syndrome since  with borderline +RG, +SSA, and lip biopsy focus score of 1. Ongoing dry mouth on evoxac qod  # Follicular bronchiolitis, prior mild ILD   # Osteopenia on DEXA 2019 with T score=-2.1 with decline in bone density on fosamax  # Chronic prednisone use  # DM  # A fib  # Severe R hip OA  # PMR stable on prednisone 5 mg every day  #Presumed gout flare, recovered  #ESOB      Assessment and Plan:    New Dx of PMR. Severe R hip pain is due to severe OA based on 2020 hip MRI. She prefered to avoid hip arthroplasty in the past but it is quite bothersome and would like to see ortho. Her inflammation markers improved on prednisone, PMR responded to prednisone, now is 5 mg qd.     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 Completed pulmonary rehab in 2019 where she was able to exercise without oxygen. GFR stable.    On HCQ since 2019 with significant improvement of joint pain. No retinal toxicity on eye exam done 2021. Tolerates HCQ well.     Sister Betty recovered from flare of presumed gout (or pseudogout given previous nl SUA) over L foot. She responded to high dose prednisone (40-30-20-10 mg every day each for 3 days) in the past, now is on 5 mg every day.  Given her DM, osteopenia, highly recommend to start using anakinra prn for gout flares, no flares and has not required it yet.     She preferred in the past not to start daily urate-lowering therapy and her uric acid has been ~6 so allopurinol deferred.     2022: Sister Betty's major  complaint at last visit was ESOB which is improved after Tx of B12 deficiency, iron deficiency due to GIB. 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 Her major complaint today is severe R hip pain due to OA which eventually requires R hip replacement, but she needs to be medically clear for surgery. Discussed pain mx and advised follow up with ortho.      Today 1/13/2023: Stable, no active gout today. B12/iron deficiency anemia has improved. On allopurinol 50 mg po every day.    Plan:    Prednisone 5 mg a day    Norco as needed for pain      Cevimeline for dry mouth could be taken 3 times a day (she takes it every other day as does not like to take so many pills)    Anakinra 100 mg a day x 3 days as needed for gout flares    Stay on fosamax weekly    Continue  mg bid    Yearly eye exam on HCQ    Tylenol extra strength 500 mg three times a day as needed in addition to your Norco    Follow up hip OA with ortho      Labs today    Allopurinol increase to 100 mg a day    Return in person in 6 months      Orders Placed This Encounter   Procedures     CRP inflammation     Erythrocyte sedimentation rate auto     Uric acid     ALT     AST     Albumin level     Creatinine     Vitamin B12     CBC with platelets differential       Zulma Lopez MD         HPI:   Betty Tee is a 81 year old WF with a history of primary Sjogren's syndrome, PMR, OA who presents for follow-up. She was diagnosed with Sjogren's syndrome in 2015 with borderline +RG, +SSA, and lip biopsy focus score of 1. Associated ILD and joint pain are prednisone-responsive which support the diagnosis.     10/1/2021: No gout flares since last visit so has not needed anakinra. She continues to have R hip pain related to severe OA but does not want to pursue surgery. She recently started pool therapy and began taking CBD oil yesterday. Is hoping to pursue medical marijuana in the future as she does not want to be dependent on norco.  Aside from hip pain has otherwise been doing well.        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 She is on prednisone 5 mg a day, she was given prednisone taper recently for possible L foot gout flare which helped. Did not flare again to need using anakinra shots. She has severe R hip pain. Saw ortho, had R hip MRI on 9/5/2020 which showed severe OA, R hip arthroplasty was recommended. She would prefer to delay R hip arthroplasty, had R hip inj on 2/24, NSAIDs are not allowed with CKD. Norco helps but she does not like to be dependent on it, takes it rarely. No L foot pain today. Her hip/leg pain/back pain is getting better, going to PT, doing exercises at home, last time elbow massage caused pain. Epidural shot helped. Has dry mouth. SOB is stable at baseline.  Last 3 wk has been hard, her doctor took her off gabapentin, sweat/chills and loss of appetite. Reason was being on other meds. Now off gabapentin. Had several gushing bowel explosion, could not make it to the bathroom. Random, unpredictable. No triggers. Has had this problem for years.     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    No gout flares, has not required anakinra, it is in the fridge.    Has breathing issue, ESOB is worse. O2 level is good. Saw PCP, was recomemnded to do follow up with cardiology.    Getting R hip steroid inj, last one was for bursitis. Still hurts crissy today. Saw pain mx yesterday. Got minimal exercises to help moving.    7/8/2022:    Sister Betty presents for follow up. At last visit,w as found to be anemic which explained her SOB. She had iron deficiency and B12 deficiency. Was found to have GIB. Had multiple GI visits, EGD, was treated with cauterization x 3  for GAVE. This AM, had brown stool not black.    SOB is improved.    No gout flares since last visit.    Her major complaint is severe R hip pain, not responding to current pain mx, considers hip arthroplasty, but was  told to wait till anemia gets fixed, also has to figure out her heart condition.      Today 1/13/2023:     No gout flare today  Anakinra prn helps with flares  SOB is better  Hip OA pain is the same, considers arthroplasty.    Review of Systems:   A comprehensive ROS was done. Positives are per HPI.      Active Medications:     Outpatient Medications Prior to Visit   Medication Sig Dispense Refill     ACCU-CHEK SHANNON PLUS test strip USE TO TEST BLOOD SUGARS 3 TIMES DAILY 300 strip 5     acetaminophen (TYLENOL) 500 MG tablet Take 1,000 mg by mouth every 8 hours as needed (max 6 tablets/24 hours, 2 tablets/dose)       acyclovir (ZOVIRAX) 400 MG tablet Take 1 tablet (400 mg) by mouth 3 times daily as needed For a couple days 15 tablet 2     acyclovir (ZOVIRAX) 5 % external ointment Apply topically as needed (6 times day PRN for outbreaks) As needed for outbreaks 15 g 3     albuterol (PROAIR HFA/PROVENTIL HFA/VENTOLIN HFA) 108 (90 Base) MCG/ACT inhaler Inhale 2 puffs into the lungs every 6 hours 18 g 11     alendronate (FOSAMAX) 70 MG tablet TAKE 1 TABLET BY MOUTH EVERY 7 DAYS 12 tablet 0     allopurinol (ZYLOPRIM) 100 MG tablet Take 0.5 tablets (50 mg) by mouth daily 15 tablet 3     anakinra (KINERET) 100 MG/0.67ML SOSY injection 100 mg every day x 3 days as needed for flare ups 6.7 mL 3     aspirin (ASA) 81 MG EC tablet Take 1 tablet (81 mg) by mouth daily 90 tablet 1     atorvastatin (LIPITOR) 10 MG tablet TAKE 1/2 TABLET BY MOUTH EVERY DAY 45 tablet 0     azithromycin (ZITHROMAX) 250 MG tablet TAKE 1 TABLET BY MOUTH EVERY DAY 30 tablet 5     BD PEN NEEDLE JANE 2ND GEN 32G X 4 MM miscellaneous USE 4 DAILY AS DIRECTED. 400 each 1     blood glucose (NO BRAND SPECIFIED) lancets standard Use to test blood sugar 3 times daily or as directed 100 lancet 3     cevimeline (EVOXAC) 30 MG capsule Take 1 capsule (30 mg) by mouth 3 times daily 270 capsule 1     Cholecalciferol (VITAMIN D3) 50 MCG (2000 UT) CAPS TAKE 100 MCG BY  MOUTH DAILY (TAKE 2 TABLET (50 MCG) BY MOUTH DAILY - ORAL) 180 capsule 0     clopidogrel (PLAVIX) 75 MG tablet Take 1 tablet (75 mg) by mouth daily 90 tablet 1     COMPOUNDED NON-CONTROLLED SUBSTANCE (CMPD RX) - PHARMACY TO MIX COMPOUNDED MEDICATION Estriol 1 mg/g Apply small amount to finger and apply to inside vagina daily for 2 weeks then twice weekly Route: vaginally Dispense 30 grams 11 refills 30 g 11     cyanocobalamin (VITAMIN B-12) 1000 MCG tablet Take 1 tablet (1,000 mcg) by mouth daily 30 tablet 1     escitalopram (LEXAPRO) 20 MG tablet TAKE 1 TABLET BY MOUTH EVERY DAY 90 tablet 0     ferrous gluconate (FERGON) 324 (38 Fe) MG tablet Take 1 tablet (324 mg) by mouth daily (with breakfast) 90 tablet 1     ferrous sulfate (FEROSUL) 325 (65 Fe) MG tablet TAKE 1 TABLET BY MOUTH EVERY DAY WITH BREAKFAST 90 tablet 0     fluticasone (FLONASE) 50 MCG/ACT nasal spray SPRAY 1-2 SPRAYS INTO BOTH NOSTRILS DAILY AS NEEDED FOR ALLERGIES 16 mL 11     fluticasone-vilanterol (BREO ELLIPTA) 100-25 MCG/INH inhaler Inhale 1 puff into the lungs daily 1 each 11     hydroxychloroquine (PLAQUENIL) 200 MG tablet Take 2 tablets (400 mg) by mouth daily Get annual eye exams for hydroxychloroquine (Plaquenil) monitoring and fax to [phone] 180 tablet 3     insulin glargine (BASAGLAR KWIKPEN) 100 UNIT/ML pen INJECT 22 UNITS SUBCUTANEOUS DAILY (TO REPLACE LANTUS) (Patient taking differently: INJECT 24 UNITS SUBCUTANEOUS DAILY (TO REPLACE LANTUS)) 15 mL 1     ketoconazole (NIZORAL) 2 % external cream Apply topically 2 times daily as needed for itching 60 g 1     KLOR-CON 20 MEQ CR tablet TAKE 2 TABLETS (40 MEQ) BY MOUTH EVERY MORNING AND 1 TABLET (20 MEQ) EVERY EVENING. 270 tablet 3     lidocaine (LIDODERM) 5 % patch APPLY PATCH TO PAINFUL AREA FOR UP TO 12 H WITHIN A 24 H PERIOD. REMOVE AFTER 12 HOURS. (Patient taking differently: Apply patch to painful area for up to 12 h within a 24 h period.  Remove after 12 hours.) 30 patch 2      loratadine (CLARITIN) 10 MG tablet TAKE 1 TABLET BY MOUTH EVERY DAY 90 tablet 3     methocarbamol (ROBAXIN) 750 MG tablet Take 1 tablet (750 mg) by mouth 3 times daily as needed for muscle spasms 90 tablet 3     metoprolol succinate ER (TOPROL XL) 50 MG 24 hr tablet Take 1 tablet (50 mg) by mouth 2 times daily 90 tablet 3     montelukast (SINGULAIR) 10 MG tablet TAKE 1 TABLET BY MOUTH EVERYDAY AT BEDTIME 90 tablet 0     NOVOLOG FLEXPEN 100 UNIT/ML soln INJECT 12 UNITS SUBCUTANEOUS 3 TIMES DAILY (WITH MEALS) 3 mL 1     oxyCODONE-acetaminophen (PERCOCET) 5-325 MG tablet Take 1 tablet by mouth 2 times daily as needed for pain May fill and start on 9/21/2022. For chronic pain. 20 tablet 0     oxyCODONE-acetaminophen (PERCOCET) 7.5-325 MG per tablet Take 1 tablet by mouth 2 times daily as needed for severe pain (7-10) With at least 4 hours between doses. OK to fill and start 1/6/23 60 tablet 0     OZEMPIC, 1 MG/DOSE, 4 MG/3ML SOPN INJECT 1 MG SUBCUTANEOUS ONCE A WEEK 3 mL 2     pantoprazole (PROTONIX) 40 MG EC tablet TAKE 1 TABLET (40 MG) BY MOUTH DAILY (REPLACES FAMOTIDINE- SHOULD STOP TAKING FAMOTIDINE) 90 tablet 0     predniSONE (DELTASONE) 5 MG tablet Take 1 tablet (5 mg) by mouth daily 90 tablet 1     spironolactone (ALDACTONE) 25 MG tablet Take 2 tablets (50 mg) by mouth daily 180 tablet 3     torsemide (DEMADEX) 10 MG tablet TAKE ONE TAB (10 MG) WITH ONE 20 MG TAB TO = 30 MG DAILY IN AFTERNOON. 90 tablet 0     torsemide (DEMADEX) 20 MG tablet TAKE 2 TABLETS (40 MG) BY MOUTH EVERY MORNING AND 1 TABLET (20 MG) DAILY AT 2 PM. TAKE THE AFTERNOON DOSE WITH AN EXTRA 10 MG TAB FOR A TOTAL OF 30 MG IN THE AFTERNOON 270 tablet 3     traZODone (DESYREL) 50 MG tablet TAKE 3 TABLETS (150 MG) BY MOUTH NIGHTLY AS NEEDED FOR SLEEP 270 tablet 0     No facility-administered medications prior to visit.     Allergies:   Augmentin\  Codeine  Phenobarbital  Seasonal allergies      Past Medical History:  Alcohol abuse, in remission.  "  Allergic rhinitis.   Antiplatelet long-term use.   Atrial fibrillation.   Cardiomegaly.   Osteopenia.   Diverticulosis.   Benign essential hypertension.   GERD.   Insomnia.   Irregular heart beat.   Lumbago.   Major depressive disorder, recurrent episode, moderate.   ANGELICA.  Osteoarthrosis.   Sjogren's syndrome.   Sleep apnea.   Tobacco use disorder.   TMJ disorder.   RLS.  Major depressive disorder.   Hypertension.   Sciatica.   Colouterine fistula.   Left ventricular hypertrophy.   Breat fibroadenoma.   DVT.   Fatty liver disease, nonalcoholic.   Rib pain.   Neck mass.   Interstitial lung disease.   Acute and chronic respiratory failure with hypoxia.   Type II diabetes mellitus.   Hyperlipidemia.   Inflammatory arthritis.      Past Surgical History:  Back surgery.   Left breast biopsy.   Appendectomy.   Exploratory laparotomy.   Cardiac surgery.   Cholecystectomy.   Left colectomy.   Total abdominal hysterectomy with bilateral salpingo-oophorectomy.   Insert ureter stent.   Flexible sigmoidoscopy.   Ileostomy takedown.     Family History:   Mother: Positive for CAD, heart disease, hypertension, cerebrovascular disease, hyperlipidemia.   Father: Positive for alcohol/drug abuse, Alzheimer disease, dementia, hypertension, hyperlipidemia.   Sister: Positive for CAD, hypertension, and colorectal cancer.   Sister: Positive for hypertension and hyperlipidemia.   Sister: Positive for diabetes, lung cancer, asthma, and heart disease.   Brother: Positive for Parkinsonism and substance abuse.   Brother: Positive for hypertension, diverticulitis, and prostate cancer.   Daughter: Positive for breast cancer.        Social History:   She is a former smoker; quit in 2011. She has a history of alcohol use but stopped drinking in 1986.      Physical Exam:     /63   Pulse 55   Ht 1.702 m (5' 7\")   Wt 80.7 kg (178 lb)   SpO2 97%   BMI 27.88 kg/m      Constitutional: NAD, very pleasant, sister Nghia present   Eyes: Normal " EOM, conjunctiva, sclera  Chest: CTAB  CV: no M/R/G, RRR  Abdomen: soft, NT  MSK: no active synovitis or joint tenderness. Painful ROM of R hip  Skin: No rash  Neuro: grossly non-focal  Psych: Normal affect.    Images:  CT Chest w/o contrast (7/25/18):  Findings remain most consistent with small airway disease  and/or nonclassifiable interstitial lung disease and are not  significantly changed from the March 2017 comparison.    Per radiology.    Laboratory:   RHEUM RESULTS Latest Ref Rng & Units 9/24/2004 10/3/2005 10/28/2005   ALBUMIN 3.4 - 5.0 g/dL - 4.0 -   ALT 10 - 35 U/L - 21 -   AST 10 - 35 U/L - 26 -   ANCA - - - -   COMPLEMENT C3 81 - 157 mg/dL - - -   COMPLEMENT C4 13 - 39 mg/dL - - -   CK TOTAL 30 - 225 U/L - - -   CREATININE 0.51 - 0.95 mg/dL 0.80 0.90 0.90   CRP 0.0 - 8.0 mg/L - - -   DNA <10.0 IU/mL - - -   FRANCISCO <1.0 - - -   GFR ESTIMATE, IF BLACK >60 mL/min/[1.73:m2] >80 >80 >80   GFR ESTIMATE >60 mL/min/1.73m2 77 67 67   HEMATOCRIT 35.0 - 47.0 % 44.8 45.5 46.9   HEMOGLOBIN 11.7 - 15.7 g/dL 15.7 14.8 15.0   IGA 84 - 499 mg/dL - - -   IGM 35 - 242 mg/dL - - -    - 1,616 mg/dL - - -   WBC 4.0 - 11.0 10e3/uL 7.2 8.6 7.4   RBC 3.80 - 5.20 10e6/uL 4.87 4.90 4.99   RDW 10.0 - 15.0 % 13.7 14.4 14.1   MCHC 31.5 - 36.5 g/dL 35.0 32.5 32.0   MCV 78 - 100 fL 92 93 94   PLATELET COUNT 150 - 450 10e3/uL 207 233 236   RHEUMATOID FACTOR <12 IU/mL - - -   ESR 0 - 30 mm/hr - - -       Rheumatoid Factor   Date Value Ref Range Status   07/24/2015 <20 <20 IU/mL Final   ,  ,   Cyclic Cit Pept IgG/IgA   Date Value Ref Range Status   07/24/2015 <20  Interpretation:  Negative   <20 UNITS Final   ,  ,   Scleroderma Antibody Scl-70 CECILIA IgG   Date Value Ref Range Status   07/24/2015  0.0 - 0.9 AI Final    <0.2  Negative   Antibody index (AI) values reflect qualitative changes in antibody   concentration that cannot be directly associated with clinical condition or   disease state.       SSA (Ro) (CECILIA) Antibody, IgG   Date  Value Ref Range Status   07/24/2015 1.6 (H) 0.0 - 0.9 AI Final     Comment:     Positive   Antibody index (AI) values reflect qualitative changes in antibody   concentration that cannot be directly associated with clinical condition or   disease state.       SSB (La) (CECILIA) Antibody, IgG   Date Value Ref Range Status   07/24/2015  0.0 - 0.9 AI Final    <0.2  Negative   Antibody index (AI) values reflect qualitative changes in antibody   concentration that cannot be directly associated with clinical condition or   disease state.       ,  ,   RG Screen by EIA   Date Value Ref Range Status   07/24/2015 <1.0  Interpretation:  Negative   <1.0 Final   ,  ,  ,  ,  ,  ,  ,  ,  ,  ,  ,  ,  ,  ,  ,  ,  ,  ,   Neutrophil Cytoplasmic IgG Antibody   Date Value Ref Range Status   07/24/2015   Final    <1:20  Reference range: <1:20  (Note)  The ANCA IFA is <1:20; therefore, no further testing will  be performed.  INTERPRETIVE INFORMATION: Anti-Neutrophil Cyto Ab, IgG  Neutrophil Cytoplasmic Antibodies (C-ANCA = granular  cytoplasmic staining, P-ANCA = perinuclear staining) are  found in the serum of over 90 percent of patients with  certain necrotizing systemic vasculitides, and usually in  less than 5 percent of patients with collagen vascular  disease or arthritis.  Performed by [facility],  [address] [place] [id number] [phone]  [email or web address], Burke Mckeon MD, Lab. Director       ,  ,  ,  ,  ,  ,  ,   IGG   Date Value Ref Range Status   07/24/2015 1320 695 - 1620 mg/dL Final   ,  ,  ,  ,  ,   Scleroderma Antibody Scl-70 CECILIA IgG   Date Value Ref Range Status   07/24/2015  0.0 - 0.9 AI Final    <0.2  Negative   Antibody index (AI) values reflect qualitative changes in antibody   concentration that cannot be directly associated with clinical condition or   disease state.          CBC RESULTS: 6/4/2021   Lab Test 06/04/21  1422   WBC 8.6   RBC 4.46   HGB 13.4   HCT 42.1   MCV 94   MCH 30.0   MCHC 31.8   RDW 15.5*         Last Comprehensive Metabolic Panel:  Sodium   Date Value Ref Range Status   11/28/2022 142 136 - 145 mmol/L Final   06/04/2021 137 133 - 144 mmol/L Final     Potassium   Date Value Ref Range Status   11/28/2022 4.0 3.4 - 5.3 mmol/L Final   08/23/2022 4.1 3.4 - 5.3 mmol/L Final   06/04/2021 4.0 3.4 - 5.3 mmol/L Final     Chloride   Date Value Ref Range Status   11/28/2022 105 98 - 107 mmol/L Final   08/23/2022 107 94 - 109 mmol/L Final   06/04/2021 106 94 - 109 mmol/L Final     Carbon Dioxide   Date Value Ref Range Status   06/04/2021 25 20 - 32 mmol/L Final     Carbon Dioxide (CO2)   Date Value Ref Range Status   11/28/2022 25 22 - 29 mmol/L Final   08/23/2022 22 20 - 32 mmol/L Final     Anion Gap   Date Value Ref Range Status   11/28/2022 12 7 - 15 mmol/L Final   08/23/2022 8 3 - 14 mmol/L Final   06/04/2021 6 3 - 14 mmol/L Final     Glucose   Date Value Ref Range Status   11/28/2022 153 (H) 70 - 99 mg/dL Final   08/23/2022 198 (H) 70 - 99 mg/dL Final   06/04/2021 142 (H) 70 - 99 mg/dL Final     GLUCOSE BY METER POCT   Date Value Ref Range Status   09/26/2022 194 (H) 70 - 99 mg/dL Final     Urea Nitrogen   Date Value Ref Range Status   11/28/2022 17.4 8.0 - 23.0 mg/dL Final   08/23/2022 17 7 - 30 mg/dL Final   06/04/2021 14 7 - 30 mg/dL Final     Creatinine   Date Value Ref Range Status   11/28/2022 1.09 (H) 0.51 - 0.95 mg/dL Final   06/04/2021 1.11 (H) 0.52 - 1.04 mg/dL Final     GFR Estimate   Date Value Ref Range Status   11/28/2022 50 (L) >60 mL/min/1.73m2 Final     Comment:     Effective December 21, 2021 eGFRcr in adults is calculated using the 2021 CKD-EPI creatinine equation which includes age and gender (Angelita et al., NEJM, DOI: 10.1056/ILRZoe2392718)   06/04/2021 47 (L) >60 mL/min/[1.73_m2] Final     Comment:     Non  GFR Calc  Starting 12/18/2018, serum creatinine based estimated GFR (eGFR) will be   calculated using the Chronic Kidney Disease Epidemiology Collaboration    (CKD-EPI) equation.       Calcium   Date Value Ref Range Status   11/28/2022 9.5 8.8 - 10.2 mg/dL Final   06/04/2021 8.8 8.5 - 10.1 mg/dL Final       ESR 6/4/2021: 10.0    CRP 6/4/2021: 3.0    Uric acid 6/4/2021: 6.2    Component      Latest Ref Rng & Units 2/11/2022   WBC      4.0 - 11.0 10e3/uL 11.5 (H)   RBC Count      3.80 - 5.20 10e6/uL 3.47 (L)   Hemoglobin      11.7 - 15.7 g/dL 9.1 (L)   Hematocrit      35.0 - 47.0 % 30.2 (L)   MCV      78 - 100 fL 87   MCH      26.5 - 33.0 pg 26.2 (L)   MCHC      31.5 - 36.5 g/dL 30.1 (L)   RDW      10.0 - 15.0 % 15.1 (H)   Platelet Count      150 - 450 10e3/uL 283   % Neutrophils      % 76   % Lymphocytes      % 9   % Monocytes      % 11   % Eosinophils      % 2   % Basophils      % 1   % Immature Granulocytes      % 1   NRBCs per 100 WBC      <1 /100 0   Absolute Neutrophils      1.6 - 8.3 10e3/uL 8.9 (H)   Absolute Lymphocytes      0.8 - 5.3 10e3/uL 1.0   Absolute Monocytes      0.0 - 1.3 10e3/uL 1.2   Absolute Eosinophils      0.0 - 0.7 10e3/uL 0.2   Absolute Basophils      0.0 - 0.2 10e3/uL 0.1   Absolute Immature Granulocytes      <=0.4 10e3/uL 0.1   Absolute NRBCs      10e3/uL 0.0   Albumin Fraction      3.7 - 5.1 g/dL 3.9   Alpha 1 Fraction      0.2 - 0.4 g/dL 0.4   Alpha 2 Fraction      0.5 - 0.9 g/dL 0.8   Beta Fraction      0.6 - 1.0 g/dL 1.0   Gamma Fraction      0.7 - 1.6 g/dL 0.8   Monoclonal Peak      <=0.0 g/dL 0.0   ELP Interpretation:       Essentially normal electrophoretic pattern. No obvious monoclonal proteins seen. Pathologic significance requires clinical correlation. Meghan Brothers M.D., Ph.D.   Iron      35 - 180 ug/dL 17 (L)   Iron Binding Cap      240 - 430 ug/dL 354   Iron Saturation Index      15 - 46 % 5 (L)   Creatinine      0.52 - 1.04 mg/dL 1.00   GFR Estimate      >60 mL/min/1.73m2 56 (L)   % Retic      0.5 - 2.0 % 2.7 (H)   Absolute Retic      0.025 - 0.095 10e6/uL 0.090   ALT      0 - 50 U/L 17   Albumin      3.4 - 5.0 g/dL 3.3 (L)   AST       0 - 45 U/L 15   Sed Rate      0 - 30 mm/hr 36 (H)   CRP Inflammation      0.0 - 8.0 mg/L 12.5 (H)   Uric Acid      2.6 - 6.0 mg/dL 6.1 (H)   Immunofixation ELP       No monoclonal protein seen on immunofixation. Pathologic significance requires clinical correlation.  Meghan Brtohers M.D., Ph.D.   Immunofix ELP Urine       No monoclonal protein seen on immunofixation. Pathologic significance requires clinical correlation.  Meghan Brothers M.D., Ph.D.   Total Protein Serum for ELP      6.8 - 8.8 g/dL 6.9   N-Terminal Pro Bnp      0 - 450 pg/mL 239   Haptoglobin      32 - 197 mg/dL 149   Vitamin B12      193 - 986 pg/mL 162 (L)   Ferritin      8 - 252 ng/mL 14

## 2023-01-13 NOTE — LETTER
2023       RE: Betty Tee  [address]  [facility] [address]     Dear Colleague,    Thank you for referring your patient, Betty Tee, to the [facility] RHEUMATOLOGY [facility]INIC [facility]. Please see a copy of my visit note below.    Rheumatology Clinic In Person Follow up Visit Note  Zulma Lopez MD  DOS: 2023  Date of last visit: 2022    Name: Betty Tee  MRN: [id number]  Age: 82 year old  : 1940  Reason for visit: Follow up visit for R hip pain sec severe OA, PMR, presumed gout flare of L foot, primary Sjogren's syndrome    # Sjogren's syndrome since  with borderline +RG, +SSA, and lip biopsy focus score of 1. Ongoing dry mouth on evoxac qod  # Follicular bronchiolitis, prior mild ILD   # Osteopenia on DEXA 2019 with T score=-2.1 with decline in bone density on fosamax  # Chronic prednisone use  # DM  # A fib  # Severe R hip OA  # PMR stable on prednisone 5 mg every day  #Presumed gout flare, recovered  #ESOB      Assessment and Plan:    New Dx of PMR. Severe R hip pain is due to severe OA based on 2020 hip MRI. She prefered to avoid hip arthroplasty in the past but it is quite bothersome and would like to see ortho. Her inflammation markers improved on prednisone, PMR responded to prednisone, now is 5 mg qd.     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 Completed pulmonary rehab in 2019 where she was able to exercise without oxygen. GFR stable.    On HCQ since 2019 with significant improvement of joint pain. No retinal toxicity on eye exam done 2021. Tolerates HCQ well.     Sister Betty recovered from flare of presumed gout (or pseudogout given previous nl SUA) over L foot. She responded to high dose prednisone (40-30-20-10 mg every day each for 3 days) in the past,  now is on 5 mg every day.  Given her DM, osteopenia, highly recommend to start using anakinra prn for gout flares, no flares and has not required it yet.     She preferred in the past not to start daily urate-lowering therapy and her uric acid has been ~6 so allopurinol deferred.     7/8/2022: Sister Betty's major complaint at last visit was ESOB which is improved after Tx of B12 deficiency, iron deficiency due to GIB. 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 Her major complaint today is severe R hip pain due to OA which eventually requires R hip replacement, but she needs to be medically clear for surgery. Discussed pain mx and advised follow up with ortho.      Today 1/13/2023: Stable, no active gout today. B12/iron deficiency anemia has improved. On allopurinol 50 mg po every day.    Plan:    Prednisone 5 mg a day    Norco as needed for pain      Cevimeline for dry mouth could be taken 3 times a day (she takes it every other day as does not like to take so many pills)    Anakinra 100 mg a day x 3 days as needed for gout flares    Stay on fosamax weekly    Continue  mg bid    Yearly eye exam on HCQ    Tylenol extra strength 500 mg three times a day as needed in addition to your Norco    Follow up hip OA with ortho      Labs today    Allopurinol increase to 100 mg a day    Return in person in 6 months      Orders Placed This Encounter   Procedures     CRP inflammation     Erythrocyte sedimentation rate auto     Uric acid     ALT     AST     Albumin level     Creatinine     Vitamin B12     CBC with platelets differential       Zulma Lopez MD         HPI:   Betty Tee is a 81 year old WF with a history of primary Sjogren's syndrome, PMR, OA who presents for follow-up. She was diagnosed with Sjogren's syndrome in 2015 with borderline +RG, +SSA, and lip biopsy focus score of 1. Associated ILD and joint pain are prednisone-responsive which support the diagnosis.     10/1/2021:  No gout flares since last visit so has not needed anakinra. She continues to have R hip pain related to severe OA but does not want to pursue surgery. She recently started pool therapy and began taking CBD oil yesterday. Is hoping to pursue medical marijuana in the future as she does not want to be dependent on norco. Aside from hip pain has otherwise been doing well.        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 She is on prednisone 5 mg a day, she was given prednisone taper recently for possible L foot gout flare which helped. Did not flare again to need using anakinra shots. She has severe R hip pain. Saw ortho, had R hip MRI on 9/5/2020 which showed severe OA, R hip arthroplasty was recommended. She would prefer to delay R hip arthroplasty, had R hip inj on 2/24, NSAIDs are not allowed with CKD. Norco helps but she does not like to be dependent on it, takes it rarely. No L foot pain today. Her hip/leg pain/back pain is getting better, going to PT, doing exercises at home, last time elbow massage caused pain. Epidural shot helped. Has dry mouth. SOB is stable at baseline.  Last 3 wk has been hard, her doctor took her off gabapentin, sweat/chills and loss of appetite. Reason was being on other meds. Now off gabapentin. Had several gushing bowel explosion, could not make it to the bathroom. Random, unpredictable. No triggers. Has had this problem for years.     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    No gout flares, has not required anakinra, it is in the fridge.    Has breathing issue, ESOB is worse. O2 level is good. Saw PCP, was recomemnded to do follow up with cardiology.    Getting R hip steroid inj, last one was for bursitis. Still hurts crissy today. Saw pain mx yesterday. Got minimal exercises to help moving.    7/8/2022:    Sister Betty presents for follow up. At last visit,w as found to be anemic which explained her SOB. She had iron deficiency and  B12 deficiency. Was found to have GIB. Had multiple GI visits, EGD, was treated with cauterization x 3  for GAVE. This AM, had brown stool not black.    SOB is improved.    No gout flares since last visit.    Her major complaint is severe R hip pain, not responding to current pain mx, considers hip arthroplasty, but was told to wait till anemia gets fixed, also has to figure out her heart condition.      Today 1/13/2023:     No gout flare today  Anakinra prn helps with flares  SOB is better  Hip OA pain is the same, considers arthroplasty.    Review of Systems:   A comprehensive ROS was done. Positives are per HPI.      Active Medications:     Outpatient Medications Prior to Visit   Medication Sig Dispense Refill     ACCU-CHEK SHANNON PLUS test strip USE TO TEST BLOOD SUGARS 3 TIMES DAILY 300 strip 5     acetaminophen (TYLENOL) 500 MG tablet Take 1,000 mg by mouth every 8 hours as needed (max 6 tablets/24 hours, 2 tablets/dose)       acyclovir (ZOVIRAX) 400 MG tablet Take 1 tablet (400 mg) by mouth 3 times daily as needed For a couple days 15 tablet 2     acyclovir (ZOVIRAX) 5 % external ointment Apply topically as needed (6 times day PRN for outbreaks) As needed for outbreaks 15 g 3     albuterol (PROAIR HFA/PROVENTIL HFA/VENTOLIN HFA) 108 (90 Base) MCG/ACT inhaler Inhale 2 puffs into the lungs every 6 hours 18 g 11     alendronate (FOSAMAX) 70 MG tablet TAKE 1 TABLET BY MOUTH EVERY 7 DAYS 12 tablet 0     allopurinol (ZYLOPRIM) 100 MG tablet Take 0.5 tablets (50 mg) by mouth daily 15 tablet 3     anakinra (KINERET) 100 MG/0.67ML SOSY injection 100 mg every day x 3 days as needed for flare ups 6.7 mL 3     aspirin (ASA) 81 MG EC tablet Take 1 tablet (81 mg) by mouth daily 90 tablet 1     atorvastatin (LIPITOR) 10 MG tablet TAKE 1/2 TABLET BY MOUTH EVERY DAY 45 tablet 0     azithromycin (ZITHROMAX) 250 MG tablet TAKE 1 TABLET BY MOUTH EVERY DAY 30 tablet 5     BD PEN NEEDLE JANE 2ND GEN 32G X 4 MM miscellaneous USE 4  DAILY AS DIRECTED. 400 each 1     blood glucose (NO BRAND SPECIFIED) lancets standard Use to test blood sugar 3 times daily or as directed 100 lancet 3     cevimeline (EVOXAC) 30 MG capsule Take 1 capsule (30 mg) by mouth 3 times daily 270 capsule 1     Cholecalciferol (VITAMIN D3) 50 MCG (2000 UT) CAPS TAKE 100 MCG BY MOUTH DAILY (TAKE 2 TABLET (50 MCG) BY MOUTH DAILY - ORAL) 180 capsule 0     clopidogrel (PLAVIX) 75 MG tablet Take 1 tablet (75 mg) by mouth daily 90 tablet 1     COMPOUNDED NON-CONTROLLED SUBSTANCE (CMPD RX) - PHARMACY TO MIX COMPOUNDED MEDICATION Estriol 1 mg/g Apply small amount to finger and apply to inside vagina daily for 2 weeks then twice weekly Route: vaginally Dispense 30 grams 11 refills 30 g 11     cyanocobalamin (VITAMIN B-12) 1000 MCG tablet Take 1 tablet (1,000 mcg) by mouth daily 30 tablet 1     escitalopram (LEXAPRO) 20 MG tablet TAKE 1 TABLET BY MOUTH EVERY DAY 90 tablet 0     ferrous gluconate (FERGON) 324 (38 Fe) MG tablet Take 1 tablet (324 mg) by mouth daily (with breakfast) 90 tablet 1     ferrous sulfate (FEROSUL) 325 (65 Fe) MG tablet TAKE 1 TABLET BY MOUTH EVERY DAY WITH BREAKFAST 90 tablet 0     fluticasone (FLONASE) 50 MCG/ACT nasal spray SPRAY 1-2 SPRAYS INTO BOTH NOSTRILS DAILY AS NEEDED FOR ALLERGIES 16 mL 11     fluticasone-vilanterol (BREO ELLIPTA) 100-25 MCG/INH inhaler Inhale 1 puff into the lungs daily 1 each 11     hydroxychloroquine (PLAQUENIL) 200 MG tablet Take 2 tablets (400 mg) by mouth daily Get annual eye exams for hydroxychloroquine (Plaquenil) monitoring and fax to [phone] 180 tablet 3     insulin glargine (BASAGLAR KWIKPEN) 100 UNIT/ML pen INJECT 22 UNITS SUBCUTANEOUS DAILY (TO REPLACE LANTUS) (Patient taking differently: INJECT 24 UNITS SUBCUTANEOUS DAILY (TO REPLACE LANTUS)) 15 mL 1     ketoconazole (NIZORAL) 2 % external cream Apply topically 2 times daily as needed for itching 60 g 1     KLOR-CON 20 MEQ CR tablet TAKE 2 TABLETS (40 MEQ) BY MOUTH  EVERY MORNING AND 1 TABLET (20 MEQ) EVERY EVENING. 270 tablet 3     lidocaine (LIDODERM) 5 % patch APPLY PATCH TO PAINFUL AREA FOR UP TO 12 H WITHIN A 24 H PERIOD. REMOVE AFTER 12 HOURS. (Patient taking differently: Apply patch to painful area for up to 12 h within a 24 h period.  Remove after 12 hours.) 30 patch 2     loratadine (CLARITIN) 10 MG tablet TAKE 1 TABLET BY MOUTH EVERY DAY 90 tablet 3     methocarbamol (ROBAXIN) 750 MG tablet Take 1 tablet (750 mg) by mouth 3 times daily as needed for muscle spasms 90 tablet 3     metoprolol succinate ER (TOPROL XL) 50 MG 24 hr tablet Take 1 tablet (50 mg) by mouth 2 times daily 90 tablet 3     montelukast (SINGULAIR) 10 MG tablet TAKE 1 TABLET BY MOUTH EVERYDAY AT BEDTIME 90 tablet 0     NOVOLOG FLEXPEN 100 UNIT/ML soln INJECT 12 UNITS SUBCUTANEOUS 3 TIMES DAILY (WITH MEALS) 3 mL 1     oxyCODONE-acetaminophen (PERCOCET) 5-325 MG tablet Take 1 tablet by mouth 2 times daily as needed for pain May fill and start on 9/21/2022. For chronic pain. 20 tablet 0     oxyCODONE-acetaminophen (PERCOCET) 7.5-325 MG per tablet Take 1 tablet by mouth 2 times daily as needed for severe pain (7-10) With at least 4 hours between doses. OK to fill and start 1/6/23 60 tablet 0     OZEMPIC, 1 MG/DOSE, 4 MG/3ML SOPN INJECT 1 MG SUBCUTANEOUS ONCE A WEEK 3 mL 2     pantoprazole (PROTONIX) 40 MG EC tablet TAKE 1 TABLET (40 MG) BY MOUTH DAILY (REPLACES FAMOTIDINE- SHOULD STOP TAKING FAMOTIDINE) 90 tablet 0     predniSONE (DELTASONE) 5 MG tablet Take 1 tablet (5 mg) by mouth daily 90 tablet 1     spironolactone (ALDACTONE) 25 MG tablet Take 2 tablets (50 mg) by mouth daily 180 tablet 3     torsemide (DEMADEX) 10 MG tablet TAKE ONE TAB (10 MG) WITH ONE 20 MG TAB TO = 30 MG DAILY IN AFTERNOON. 90 tablet 0     torsemide (DEMADEX) 20 MG tablet TAKE 2 TABLETS (40 MG) BY MOUTH EVERY MORNING AND 1 TABLET (20 MG) DAILY AT 2 PM. TAKE THE AFTERNOON DOSE WITH AN EXTRA 10 MG TAB FOR A TOTAL OF 30 MG IN THE  AFTERNOON 270 tablet 3     traZODone (DESYREL) 50 MG tablet TAKE 3 TABLETS (150 MG) BY MOUTH NIGHTLY AS NEEDED FOR SLEEP 270 tablet 0     No facility-administered medications prior to visit.     Allergies:   Augmentin\  Codeine  Phenobarbital  Seasonal allergies      Past Medical History:  Alcohol abuse, in remission.   Allergic rhinitis.   Antiplatelet long-term use.   Atrial fibrillation.   Cardiomegaly.   Osteopenia.   Diverticulosis.   Benign essential hypertension.   GERD.   Insomnia.   Irregular heart beat.   Lumbago.   Major depressive disorder, recurrent episode, moderate.   ANGELICA.  Osteoarthrosis.   Sjogren's syndrome.   Sleep apnea.   Tobacco use disorder.   TMJ disorder.   RLS.  Major depressive disorder.   Hypertension.   Sciatica.   Colouterine fistula.   Left ventricular hypertrophy.   Breat fibroadenoma.   DVT.   Fatty liver disease, nonalcoholic.   Rib pain.   Neck mass.   Interstitial lung disease.   Acute and chronic respiratory failure with hypoxia.   Type II diabetes mellitus.   Hyperlipidemia.   Inflammatory arthritis.      Past Surgical History:  Back surgery.   Left breast biopsy.   Appendectomy.   Exploratory laparotomy.   Cardiac surgery.   Cholecystectomy.   Left colectomy.   Total abdominal hysterectomy with bilateral salpingo-oophorectomy.   Insert ureter stent.   Flexible sigmoidoscopy.   Ileostomy takedown.     Family History:   Mother: Positive for CAD, heart disease, hypertension, cerebrovascular disease, hyperlipidemia.   Father: Positive for alcohol/drug abuse, Alzheimer disease, dementia, hypertension, hyperlipidemia.   Sister: Positive for CAD, hypertension, and colorectal cancer.   Sister: Positive for hypertension and hyperlipidemia.   Sister: Positive for diabetes, lung cancer, asthma, and heart disease.   Brother: Positive for Parkinsonism and substance abuse.   Brother: Positive for hypertension, diverticulitis, and prostate cancer.   Daughter: Positive for breast cancer.       "  Social History:   She is a former smoker; quit in 2011. She has a history of alcohol use but stopped drinking in 1986.      Physical Exam:     /63   Pulse 55   Ht 1.702 m (5' 7\")   Wt 80.7 kg (178 lb)   SpO2 97%   BMI 27.88 kg/m      Constitutional: NAD, very pleasant, sister Nghia present   Eyes: Normal EOM, conjunctiva, sclera  Chest: CTAB  CV: no M/R/G, RRR  Abdomen: soft, NT  MSK: no active synovitis or joint tenderness. Painful ROM of R hip  Skin: No rash  Neuro: grossly non-focal  Psych: Normal affect.    Images:  CT Chest w/o contrast (7/25/18):  Findings remain most consistent with small airway disease  and/or nonclassifiable interstitial lung disease and are not  significantly changed from the March 2017 comparison.    Per radiology.    Laboratory:   RHEUM RESULTS Latest Ref Rng & Units 9/24/2004 10/3/2005 10/28/2005   ALBUMIN 3.4 - 5.0 g/dL - 4.0 -   ALT 10 - 35 U/L - 21 -   AST 10 - 35 U/L - 26 -   ANCA - - - -   COMPLEMENT C3 81 - 157 mg/dL - - -   COMPLEMENT C4 13 - 39 mg/dL - - -   CK TOTAL 30 - 225 U/L - - -   CREATININE 0.51 - 0.95 mg/dL 0.80 0.90 0.90   CRP 0.0 - 8.0 mg/L - - -   DNA <10.0 IU/mL - - -   FRANCISCO <1.0 - - -   GFR ESTIMATE, IF BLACK >60 mL/min/[1.73:m2] >80 >80 >80   GFR ESTIMATE >60 mL/min/1.73m2 77 67 67   HEMATOCRIT 35.0 - 47.0 % 44.8 45.5 46.9   HEMOGLOBIN 11.7 - 15.7 g/dL 15.7 14.8 15.0   IGA 84 - 499 mg/dL - - -   IGM 35 - 242 mg/dL - - -    - 1,616 mg/dL - - -   WBC 4.0 - 11.0 10e3/uL 7.2 8.6 7.4   RBC 3.80 - 5.20 10e6/uL 4.87 4.90 4.99   RDW 10.0 - 15.0 % 13.7 14.4 14.1   MCHC 31.5 - 36.5 g/dL 35.0 32.5 32.0   MCV 78 - 100 fL 92 93 94   PLATELET COUNT 150 - 450 10e3/uL 207 233 236   RHEUMATOID FACTOR <12 IU/mL - - -   ESR 0 - 30 mm/hr - - -       Rheumatoid Factor   Date Value Ref Range Status   07/24/2015 <20 <20 IU/mL Final   ,  ,   Cyclic Cit Pept IgG/IgA   Date Value Ref Range Status   07/24/2015 <20  Interpretation:  Negative   <20 UNITS Final   ,  , "   Scleroderma Antibody Scl-70 CECILIA IgG   Date Value Ref Range Status   07/24/2015  0.0 - 0.9 AI Final    <0.2  Negative   Antibody index (AI) values reflect qualitative changes in antibody   concentration that cannot be directly associated with clinical condition or   disease state.       SSA (Ro) (CECILIA) Antibody, IgG   Date Value Ref Range Status   07/24/2015 1.6 (H) 0.0 - 0.9 AI Final     Comment:     Positive   Antibody index (AI) values reflect qualitative changes in antibody   concentration that cannot be directly associated with clinical condition or   disease state.       SSB (La) (CECILIA) Antibody, IgG   Date Value Ref Range Status   07/24/2015  0.0 - 0.9 AI Final    <0.2  Negative   Antibody index (AI) values reflect qualitative changes in antibody   concentration that cannot be directly associated with clinical condition or   disease state.       ,  ,   RG Screen by EIA   Date Value Ref Range Status   07/24/2015 <1.0  Interpretation:  Negative   <1.0 Final   ,  ,  ,  ,  ,  ,  ,  ,  ,  ,  ,  ,  ,  ,  ,  ,  ,  ,   Neutrophil Cytoplasmic IgG Antibody   Date Value Ref Range Status   07/24/2015   Final    <1:20  Reference range: <1:20  (Note)  The ANCA IFA is <1:20; therefore, no further testing will  be performed.  INTERPRETIVE INFORMATION: Anti-Neutrophil Cyto Ab, IgG  Neutrophil Cytoplasmic Antibodies (C-ANCA = granular  cytoplasmic staining, P-ANCA = perinuclear staining) are  found in the serum of over 90 percent of patients with  certain necrotizing systemic vasculitides, and usually in  less than 5 percent of patients with collagen vascular  disease or arthritis.  Performed by [facility],  [address] [place] [id number] [phone]  [email or web address], Burke Mckeon MD, Lab. Director       ,  ,  ,  ,  ,  ,  ,   IGG   Date Value Ref Range Status   07/24/2015 1320 695 - 1620 mg/dL Final   ,  ,  ,  ,  ,   Scleroderma Antibody Scl-70 CECILIA IgG   Date Value Ref Range Status   07/24/2015  0.0 - 0.9 AI Final     <0.2  Negative   Antibody index (AI) values reflect qualitative changes in antibody   concentration that cannot be directly associated with clinical condition or   disease state.          CBC RESULTS: 6/4/2021   Lab Test 06/04/21  1422   WBC 8.6   RBC 4.46   HGB 13.4   HCT 42.1   MCV 94   MCH 30.0   MCHC 31.8   RDW 15.5*        Last Comprehensive Metabolic Panel:  Sodium   Date Value Ref Range Status   11/28/2022 142 136 - 145 mmol/L Final   06/04/2021 137 133 - 144 mmol/L Final     Potassium   Date Value Ref Range Status   11/28/2022 4.0 3.4 - 5.3 mmol/L Final   08/23/2022 4.1 3.4 - 5.3 mmol/L Final   06/04/2021 4.0 3.4 - 5.3 mmol/L Final     Chloride   Date Value Ref Range Status   11/28/2022 105 98 - 107 mmol/L Final   08/23/2022 107 94 - 109 mmol/L Final   06/04/2021 106 94 - 109 mmol/L Final     Carbon Dioxide   Date Value Ref Range Status   06/04/2021 25 20 - 32 mmol/L Final     Carbon Dioxide (CO2)   Date Value Ref Range Status   11/28/2022 25 22 - 29 mmol/L Final   08/23/2022 22 20 - 32 mmol/L Final     Anion Gap   Date Value Ref Range Status   11/28/2022 12 7 - 15 mmol/L Final   08/23/2022 8 3 - 14 mmol/L Final   06/04/2021 6 3 - 14 mmol/L Final     Glucose   Date Value Ref Range Status   11/28/2022 153 (H) 70 - 99 mg/dL Final   08/23/2022 198 (H) 70 - 99 mg/dL Final   06/04/2021 142 (H) 70 - 99 mg/dL Final     GLUCOSE BY METER POCT   Date Value Ref Range Status   09/26/2022 194 (H) 70 - 99 mg/dL Final     Urea Nitrogen   Date Value Ref Range Status   11/28/2022 17.4 8.0 - 23.0 mg/dL Final   08/23/2022 17 7 - 30 mg/dL Final   06/04/2021 14 7 - 30 mg/dL Final     Creatinine   Date Value Ref Range Status   11/28/2022 1.09 (H) 0.51 - 0.95 mg/dL Final   06/04/2021 1.11 (H) 0.52 - 1.04 mg/dL Final     GFR Estimate   Date Value Ref Range Status   11/28/2022 50 (L) >60 mL/min/1.73m2 Final     Comment:     Effective December 21, 2021 eGFRcr in adults is calculated using the 2021 CKD-EPI creatinine  equation which includes age and gender (Angelita meyer al., [place], DOI: 10.1056/UXMJao4465266)   06/04/2021 47 (L) >60 mL/min/[1.73_m2] Final     Comment:     Non  GFR Calc  Starting 12/18/2018, serum creatinine based estimated GFR (eGFR) will be   calculated using the Chronic Kidney Disease Epidemiology Collaboration   (CKD-EPI) equation.       Calcium   Date Value Ref Range Status   11/28/2022 9.5 8.8 - 10.2 mg/dL Final   06/04/2021 8.8 8.5 - 10.1 mg/dL Final       ESR 6/4/2021: 10.0    CRP 6/4/2021: 3.0    Uric acid 6/4/2021: 6.2    Component      Latest Ref Rng & Units 2/11/2022   WBC      4.0 - 11.0 10e3/uL 11.5 (H)   RBC Count      3.80 - 5.20 10e6/uL 3.47 (L)   Hemoglobin      11.7 - 15.7 g/dL 9.1 (L)   Hematocrit      35.0 - 47.0 % 30.2 (L)   MCV      78 - 100 fL 87   MCH      26.5 - 33.0 pg 26.2 (L)   MCHC      31.5 - 36.5 g/dL 30.1 (L)   RDW      10.0 - 15.0 % 15.1 (H)   Platelet Count      150 - 450 10e3/uL 283   % Neutrophils      % 76   % Lymphocytes      % 9   % Monocytes      % 11   % Eosinophils      % 2   % Basophils      % 1   % Immature Granulocytes      % 1   NRBCs per 100 WBC      <1 /100 0   Absolute Neutrophils      1.6 - 8.3 10e3/uL 8.9 (H)   Absolute Lymphocytes      0.8 - 5.3 10e3/uL 1.0   Absolute Monocytes      0.0 - 1.3 10e3/uL 1.2   Absolute Eosinophils      0.0 - 0.7 10e3/uL 0.2   Absolute Basophils      0.0 - 0.2 10e3/uL 0.1   Absolute Immature Granulocytes      <=0.4 10e3/uL 0.1   Absolute NRBCs      10e3/uL 0.0   Albumin Fraction      3.7 - 5.1 g/dL 3.9   Alpha 1 Fraction      0.2 - 0.4 g/dL 0.4   Alpha 2 Fraction      0.5 - 0.9 g/dL 0.8   Beta Fraction      0.6 - 1.0 g/dL 1.0   Gamma Fraction      0.7 - 1.6 g/dL 0.8   Monoclonal Peak      <=0.0 g/dL 0.0   ELP Interpretation:       Essentially normal electrophoretic pattern. No obvious monoclonal proteins seen. Pathologic significance requires clinical correlation. Meghan Brothers M.D., Ph.D.   Iron      35 - 180 ug/dL 17 (L)    Iron Binding Cap      240 - 430 ug/dL 354   Iron Saturation Index      15 - 46 % 5 (L)   Creatinine      0.52 - 1.04 mg/dL 1.00   GFR Estimate      >60 mL/min/1.73m2 56 (L)   % Retic      0.5 - 2.0 % 2.7 (H)   Absolute Retic      0.025 - 0.095 10e6/uL 0.090   ALT      0 - 50 U/L 17   Albumin      3.4 - 5.0 g/dL 3.3 (L)   AST      0 - 45 U/L 15   Sed Rate      0 - 30 mm/hr 36 (H)   CRP Inflammation      0.0 - 8.0 mg/L 12.5 (H)   Uric Acid      2.6 - 6.0 mg/dL 6.1 (H)   Immunofixation ELP       No monoclonal protein seen on immunofixation. Pathologic significance requires clinical correlation.  eMghan Brothers M.D., Ph.D.   Immunofix ELP Urine       No monoclonal protein seen on immunofixation. Pathologic significance requires clinical correlation.  Meghan Brothers M.D., Ph.D.   Total Protein Serum for ELP      6.8 - 8.8 g/dL 6.9   N-Terminal Pro Bnp      0 - 450 pg/mL 239   Haptoglobin      32 - 197 mg/dL 149   Vitamin B12      193 - 986 pg/mL 162 (L)   Ferritin      8 - 252 ng/mL 14         Zulma Lopez MD

## 2023-01-14 DIAGNOSIS — E11.65 TYPE 2 DIABETES MELLITUS WITH HYPERGLYCEMIA, WITH LONG-TERM CURRENT USE OF INSULIN (H): ICD-10-CM

## 2023-01-14 DIAGNOSIS — Z79.4 TYPE 2 DIABETES MELLITUS WITH HYPERGLYCEMIA, WITH LONG-TERM CURRENT USE OF INSULIN (H): ICD-10-CM

## 2023-01-15 DIAGNOSIS — E55.9 VITAMIN D DEFICIENCY: ICD-10-CM

## 2023-01-16 RX ORDER — INSULIN GLARGINE 100 [IU]/ML
INJECTION, SOLUTION SUBCUTANEOUS
Qty: 15 ML | Refills: 1 | Status: SHIPPED | OUTPATIENT
Start: 2023-01-16 | End: 2023-03-30

## 2023-01-16 NOTE — TELEPHONE ENCOUNTER
Routing refill request to provider for review/approval because:  Pt is taking differently than prescribed, takes 24u    Please update sig    Jessica Cotton RN   [facility]

## 2023-01-16 NOTE — TELEPHONE ENCOUNTER
"Requested Prescriptions   Pending Prescriptions Disp Refills     Cholecalciferol (VITAMIN D3) 50 MCG (2000 UT) CAPS [Pharmacy Med Name: VITAMIN D3 50 MCG SOFTGEL] 180 capsule 0     Sig: TAKE 100 MCG BY MOUTH DAILY (TAKE 2 TABLET (50 MCG) BY MOUTH DAILY - ORAL)       Vitamin Supplements (Adult) Protocol Passed - 1/15/2023  5:01 PM        Passed - High dose Vitamin D not ordered        Passed - Recent (12 mo) or future (30 days) visit within the authorizing provider's specialty     Patient has had an office visit with the authorizing provider or a provider within the authorizing providers department within the previous 12 mos or has a future within next 30 days. See \"Patient Info\" tab in inbasket, or \"Choose Columns\" in Meds & Orders section of the refill encounter.              Passed - Medication is active on med list           Prescription approved per [facility] Refill Protocol.    Lilibeth Pérez RN  [facility]   "

## 2023-01-19 RX ORDER — ACETAMINOPHEN 160 MG
100 TABLET,DISINTEGRATING ORAL DAILY
Qty: 180 CAPSULE | Refills: 0 | Status: SHIPPED | OUTPATIENT
Start: 2023-01-19 | End: 2023-11-02

## 2023-01-20 DIAGNOSIS — M10.9 ACUTE GOUT OF FOOT, UNSPECIFIED CAUSE, UNSPECIFIED LATERALITY: ICD-10-CM

## 2023-01-20 RX ORDER — ALLOPURINOL 100 MG/1
TABLET ORAL
Qty: 45 TABLET | Refills: 1 | OUTPATIENT
Start: 2023-01-20

## 2023-01-21 DIAGNOSIS — K21.9 GASTROESOPHAGEAL REFLUX DISEASE WITHOUT ESOPHAGITIS: ICD-10-CM

## 2023-01-21 DIAGNOSIS — Z87.19 HISTORY OF LOWER GI BLEEDING: ICD-10-CM

## 2023-01-23 DIAGNOSIS — M35.02 SJOGREN'S SYNDROME WITH LUNG INVOLVEMENT (H): ICD-10-CM

## 2023-01-25 RX ORDER — PANTOPRAZOLE SODIUM 40 MG/1
40 TABLET, DELAYED RELEASE ORAL DAILY
Qty: 90 TABLET | Refills: 3 | Status: SHIPPED | OUTPATIENT
Start: 2023-01-25 | End: 2024-03-03

## 2023-01-25 NOTE — TELEPHONE ENCOUNTER
10/6/2022  [facility] [place]   Guru Winsome Dias MD

## 2023-01-26 RX ORDER — CEVIMELINE HYDROCHLORIDE 30 MG/1
30 CAPSULE ORAL 3 TIMES DAILY
Qty: 90 CAPSULE | Refills: 11 | Status: ON HOLD | OUTPATIENT
Start: 2023-01-26 | End: 2023-06-01

## 2023-01-26 NOTE — TELEPHONE ENCOUNTER
Medication/Dose: cevimeline (EVOXAC) 30 MG capsule    Last Written : 12/28/22  Last Quantity: 270, # refills: 1  Last Office Visit :  1/13/23  Pending appointment: 7/21/23    Prescription approved per Refill Protocol    BARBI Lopez, RN  [facility] Refill Team

## 2023-01-31 DIAGNOSIS — I50.30 (HFPEF) HEART FAILURE WITH PRESERVED EJECTION FRACTION (H): ICD-10-CM

## 2023-01-31 DIAGNOSIS — I50.43 ACUTE ON CHRONIC COMBINED SYSTOLIC AND DIASTOLIC HEART FAILURE (H): ICD-10-CM

## 2023-02-01 NOTE — TELEPHONE ENCOUNTER
torsemide (DEMADEX) 10 MG tablet 90 tablet 0 11/5/2022         Last Written Prescription Date:  11-5-2022  Last Fill Quantity: 90,   # refills: 0    Last Office Visit : 10-6-2022  Future Office visit:  3-    Routing refill request to provider for review/approval because:  ELEVATED CREAT      Kathleen M Doege RN

## 2023-02-01 NOTE — TELEPHONE ENCOUNTER
torsemide (DEMADEX) 20 MG tablet 270 tablet 3 1/13/2022         Last Written Prescription Date:  1-  Last Fill Quantity: 270,   # refills: 3    Last Office Visit : 10-6-2022  Future Office visit:  3-    Routing refill request to provider for review/approval because:  ELEVATED CREAT      Kathleen M Doege RN

## 2023-02-02 RX ORDER — TORSEMIDE 20 MG/1
TABLET ORAL
Qty: 270 TABLET | Refills: 3 | Status: SHIPPED | OUTPATIENT
Start: 2023-02-02 | End: 2023-04-13

## 2023-02-03 RX ORDER — TORSEMIDE 10 MG/1
TABLET ORAL
Qty: 90 TABLET | Refills: 3 | Status: SHIPPED | OUTPATIENT
Start: 2023-02-03 | End: 2023-03-30

## 2023-02-08 DIAGNOSIS — M10.9 ACUTE GOUT OF LEFT FOOT, UNSPECIFIED CAUSE: ICD-10-CM

## 2023-02-08 DIAGNOSIS — R79.89 ELEVATED PARATHYROID HORMONE: ICD-10-CM

## 2023-02-08 DIAGNOSIS — Z79.4 TYPE 2 DIABETES MELLITUS WITH HYPERGLYCEMIA, WITH LONG-TERM CURRENT USE OF INSULIN (H): ICD-10-CM

## 2023-02-08 DIAGNOSIS — I10 ESSENTIAL HYPERTENSION WITH GOAL BLOOD PRESSURE LESS THAN 140/90: Primary | ICD-10-CM

## 2023-02-08 DIAGNOSIS — E11.65 TYPE 2 DIABETES MELLITUS WITH HYPERGLYCEMIA, WITH LONG-TERM CURRENT USE OF INSULIN (H): ICD-10-CM

## 2023-02-08 DIAGNOSIS — E78.5 HYPERLIPIDEMIA LDL GOAL <100: ICD-10-CM

## 2023-02-08 DIAGNOSIS — M06.00 SERONEGATIVE RHEUMATOID ARTHRITIS (H): ICD-10-CM

## 2023-02-09 RX ORDER — ATORVASTATIN CALCIUM 10 MG/1
TABLET, FILM COATED ORAL
Qty: 45 TABLET | Refills: 0 | Status: SHIPPED | OUTPATIENT
Start: 2023-02-09 | End: 2023-05-11

## 2023-02-09 NOTE — TELEPHONE ENCOUNTER
"Requested Prescriptions   Pending Prescriptions Disp Refills     atorvastatin (LIPITOR) 10 MG tablet [Pharmacy Med Name: ATORVASTATIN 10 MG TABLET] 45 tablet 0     Sig: TAKE 1/2 TABLET BY MOUTH EVERY DAY       Statins Protocol Failed - 2/8/2023 12:22 AM        Failed - LDL on file in past 12 months     Recent Labs   Lab Test 12/09/21  1033   LDL 16             Passed - No abnormal creatine kinase in past 12 months     Recent Labs   Lab Test 07/24/15  1236   CKT 57                Passed - Recent (12 mo) or future (30 days) visit within the authorizing provider's specialty     Patient has had an office visit with the authorizing provider or a provider within the authorizing providers department within the previous 12 mos or has a future within next 30 days. See \"Patient Info\" tab in inbasket, or \"Choose Columns\" in Meds & Orders section of the refill encounter.              Passed - Medication is active on med list        Passed - Patient is age 18 or older        Passed - No active pregnancy on record        Passed - No positive pregnancy test in past 12 months           Has upcoming abiel on 3/21/23.    Routing refill request to provider for review/approval because:  Labs not current:  LDL    Vince Fountain RN   [facility]          "

## 2023-02-09 NOTE — TELEPHONE ENCOUNTER
Reviewed chart- pt due for a few more yearly labs, so added lipids, CMP and TSH to other labs ordered previously (A1C, Vit D, Parathyroid).  She has a lab only appt scheduled for 3/14/23 and follow-up appt with me on 3/23/23.  Rx sent for lipitor.  CW

## 2023-02-10 RX ORDER — PREDNISONE 5 MG/1
5 TABLET ORAL DAILY
Qty: 90 TABLET | Refills: 1 | Status: SHIPPED | OUTPATIENT
Start: 2023-02-10 | End: 2023-08-12

## 2023-02-10 NOTE — TELEPHONE ENCOUNTER
predniSONE (DELTASONE) 5 MG tablet 90 tablet 1 8/16/2022         Last Written Prescription Date:  8-  Last Fill Quantity: 90,   # refills: 1  Last Office Visit : 1-  Future Office visit:  7-    Kathleen M Doege RN

## 2023-02-16 ENCOUNTER — OFFICE VISIT (OUTPATIENT)
Dept: ORTHOPEDICS | Facility: CLINIC | Age: 83
End: 2023-02-16
Payer: MEDICARE

## 2023-02-16 DIAGNOSIS — M16.11 OSTEOARTHRITIS OF RIGHT HIP, UNSPECIFIED OSTEOARTHRITIS TYPE: ICD-10-CM

## 2023-02-16 DIAGNOSIS — M16.11 PRIMARY LOCALIZED OSTEOARTHRITIS OF RIGHT HIP: Primary | ICD-10-CM

## 2023-02-16 PROCEDURE — 99214 OFFICE O/P EST MOD 30 MIN: CPT | Performed by: ORTHOPAEDIC SURGERY

## 2023-02-16 ASSESSMENT — HOOS JR
GOING UP OR DOWN STAIRS: MODERATE
WALKING ON UNEVEN SURFACE: MODERATE
LYING IN BED (TURNING OVER, MAINTAINING HIP POSITION): MODERATE
BENDING TO THE FLOOR TO PICK UP OBJECT: MODERATE
RISING FROM SITTING: MODERATE
SITTING: MILD
HOOS JR TOTAL INTERVAL SCORE: 55.99

## 2023-02-16 NOTE — NURSING NOTE
Reason For Visit:   Chief Complaint   Patient presents with     RECHECK     Discuss SRI. Cardiology clearance - LOV 11/14/22       There were no vitals taken for this visit.         Ling Bethea, ATC

## 2023-02-16 NOTE — LETTER
2/16/2023         RE: Betty Tee  [address]  [facility] [address]        Dear Colleague,    Thank you for referring your patient, Betty Tee, to the [facility] [place]. Please see a copy of my visit note below.    Chief Complaint: RECHECK (Discuss SRI. Cardiology clearance - LOV 11/14/22)       HPI: Betty Tee returns today in follow-up for her right hip. She reports continued severe symptoms and has become largely wheel chair bound out of the home. Here with Sister Nghia. Has gotten cardiac clearance. Would like to go forward with total hip.    Medications and allergies are documented in the EMR and have been reviewed    Current Outpatient Medications:      ACCU-CHEK SHANNON PLUS test strip, USE TO TEST BLOOD SUGARS 3 TIMES DAILY, Disp: 300 strip, Rfl: 5     acetaminophen (TYLENOL) 500 MG tablet, Take 1,000 mg by mouth every 8 hours as needed (max 6 tablets/24 hours, 2 tablets/dose), Disp: , Rfl:      acyclovir (ZOVIRAX) 400 MG tablet, Take 1 tablet (400 mg) by mouth 3 times daily as needed For a couple days, Disp: 15 tablet, Rfl: 2     acyclovir (ZOVIRAX) 5 % external ointment, Apply topically as needed (6 times day PRN for outbreaks) As needed for outbreaks, Disp: 15 g, Rfl: 3     albuterol (PROAIR HFA/PROVENTIL HFA/VENTOLIN HFA) 108 (90 Base) MCG/ACT inhaler, Inhale 2 puffs into the lungs every 6 hours, Disp: 18 g, Rfl: 11     alendronate (FOSAMAX) 70 MG tablet, TAKE 1 TABLET BY MOUTH EVERY 7 DAYS, Disp: 12 tablet, Rfl: 0     allopurinol (ZYLOPRIM) 100 MG tablet, Take 1 tablet (100 mg) by mouth daily, Disp: 90 tablet, Rfl: 1     anakinra (KINERET) 100 MG/0.67ML SOSY injection, 100 mg every day x 3 days as needed for flare ups, Disp: 6.7 mL, Rfl: 3     aspirin (ASA) 81 MG EC tablet, Take 1 tablet (81 mg) by mouth daily, Disp: 90 tablet, Rfl: 1     atorvastatin (LIPITOR) 10 MG tablet, TAKE 1/2 TABLET BY MOUTH EVERY DAY, Disp: 45 tablet, Rfl: 0      azithromycin (ZITHROMAX) 250 MG tablet, TAKE 1 TABLET BY MOUTH EVERY DAY, Disp: 30 tablet, Rfl: 5     BD PEN NEEDLE JANE 2ND GEN 32G X 4 MM miscellaneous, USE TO TEST 4 TIMES A DAY, Disp: 400 each, Rfl: 1     blood glucose (NO BRAND SPECIFIED) lancets standard, Use to test blood sugar 3 times daily or as directed, Disp: 100 lancet, Rfl: 3     cevimeline (EVOXAC) 30 MG capsule, Take 1 capsule (30 mg) by mouth 3 times daily, Disp: 90 capsule, Rfl: 11     Cholecalciferol (VITAMIN D3) 50 MCG (2000 UT) CAPS, TAKE 100 MCG BY MOUTH DAILY (TAKE 2 TABLET (50 MCG) BY MOUTH DAILY - ORAL), Disp: 180 capsule, Rfl: 0     clopidogrel (PLAVIX) 75 MG tablet, Take 1 tablet (75 mg) by mouth daily, Disp: 90 tablet, Rfl: 1     COMPOUNDED NON-CONTROLLED SUBSTANCE (CMPD RX) - PHARMACY TO MIX COMPOUNDED MEDICATION, Estriol 1 mg/g Apply small amount to finger and apply to inside vagina daily for 2 weeks then twice weekly Route: vaginally Dispense 30 grams 11 refills, Disp: 30 g, Rfl: 11     cyanocobalamin (VITAMIN B-12) 1000 MCG tablet, Take 1 tablet (1,000 mcg) by mouth daily, Disp: 30 tablet, Rfl: 1     escitalopram (LEXAPRO) 20 MG tablet, TAKE 1 TABLET BY MOUTH EVERY DAY, Disp: 90 tablet, Rfl: 0     ferrous gluconate (FERGON) 324 (38 Fe) MG tablet, Take 1 tablet (324 mg) by mouth daily (with breakfast), Disp: 90 tablet, Rfl: 1     ferrous sulfate (FEROSUL) 325 (65 Fe) MG tablet, TAKE 1 TABLET BY MOUTH EVERY DAY WITH BREAKFAST, Disp: 90 tablet, Rfl: 0     fluticasone (FLONASE) 50 MCG/ACT nasal spray, SPRAY 1-2 SPRAYS INTO BOTH NOSTRILS DAILY AS NEEDED FOR ALLERGIES, Disp: 16 mL, Rfl: 11     fluticasone-vilanterol (BREO ELLIPTA) 100-25 MCG/INH inhaler, Inhale 1 puff into the lungs daily, Disp: 1 each, Rfl: 11     hydroxychloroquine (PLAQUENIL) 200 MG tablet, Take 2 tablets (400 mg) by mouth daily Get annual eye exams for hydroxychloroquine (Plaquenil) monitoring and fax to [phone], Disp: 180 tablet, Rfl: 3     insulin glargine  (BASAGLAR KWIKPEN) 100 UNIT/ML pen, 3INJECT 24 UNITS SUBCUTANEOUS DAILY (TO REPLACE LANTUS), Disp: 15 mL, Rfl: 1     insulin lispro (HUMALOG KWIKPEN) 100 UNIT/ML (1 unit dial) KWIKPEN, Inject 12 Units Subcutaneous 3 times daily (before meals) (12 units plus 2 unit prime x 3 doses = 42 units/day), Disp: 45 mL, Rfl: 1     ketoconazole (NIZORAL) 2 % external cream, Apply topically 2 times daily as needed for itching, Disp: 60 g, Rfl: 1     KLOR-CON 20 MEQ CR tablet, TAKE 2 TABLETS (40 MEQ) BY MOUTH EVERY MORNING AND 1 TABLET (20 MEQ) EVERY EVENING., Disp: 270 tablet, Rfl: 3     lidocaine (LIDODERM) 5 % patch, APPLY PATCH TO PAINFUL AREA FOR UP TO 12 H WITHIN A 24 H PERIOD. REMOVE AFTER 12 HOURS. (Patient taking differently: Apply patch to painful area for up to 12 h within a 24 h period.  Remove after 12 hours.), Disp: 30 patch, Rfl: 2     loratadine (CLARITIN) 10 MG tablet, TAKE 1 TABLET BY MOUTH EVERY DAY, Disp: 90 tablet, Rfl: 3     methocarbamol (ROBAXIN) 750 MG tablet, Take 1 tablet (750 mg) by mouth 3 times daily as needed for muscle spasms, Disp: 90 tablet, Rfl: 3     metoprolol succinate ER (TOPROL XL) 50 MG 24 hr tablet, Take 1 tablet (50 mg) by mouth 2 times daily, Disp: 90 tablet, Rfl: 3     montelukast (SINGULAIR) 10 MG tablet, TAKE 1 TABLET BY MOUTH EVERYDAY AT BEDTIME, Disp: 90 tablet, Rfl: 0     oxyCODONE-acetaminophen (PERCOCET) 5-325 MG tablet, Take 1 tablet by mouth 2 times daily as needed for pain May fill and start on 9/21/2022. For chronic pain., Disp: 20 tablet, Rfl: 0     oxyCODONE-acetaminophen (PERCOCET) 7.5-325 MG per tablet, Take 1 tablet by mouth 2 times daily as needed for severe pain (7-10) With at least 4 hours between doses. Fill and start 2/17/23, Disp: 60 tablet, Rfl: 0     OZEMPIC, 1 MG/DOSE, 4 MG/3ML pen, INJECT 1 MG SUBCUTANEOUS ONCE A WEEK, Disp: 3 mL, Rfl: 1     pantoprazole (PROTONIX) 40 MG EC tablet, Take 1 tablet (40 mg) by mouth daily (replaces famotidine- should stop taking  famotidine), Disp: 90 tablet, Rfl: 3     predniSONE (DELTASONE) 5 MG tablet, Take 1 tablet (5 mg) by mouth daily, Disp: 90 tablet, Rfl: 1     spironolactone (ALDACTONE) 25 MG tablet, Take 2 tablets (50 mg) by mouth daily, Disp: 180 tablet, Rfl: 3     torsemide (DEMADEX) 10 MG tablet, TAKE ONE TAB (10 MG) WITH ONE 20 MG TAB TO = 30 MG DAILY IN AFTERNOON., Disp: 90 tablet, Rfl: 3     torsemide (DEMADEX) 20 MG tablet, TAKE 2 TABLETS (40 MG) BY MOUTH EVERY MORNING AND 1 TABLET (20 MG) DAILY AT 2 PM. TAKE THE AFTERNOON DOSE WITH AN EXTRA 10 MG TAB FOR A TOTAL OF 30 MG IN THE AFTERNOON, Disp: 270 tablet, Rfl: 3     traZODone (DESYREL) 50 MG tablet, TAKE 3 TABLETS (150 MG) BY MOUTH NIGHTLY AS NEEDED FOR SLEEP, Disp: 270 tablet, Rfl: 0  Allergies: Amoxicillin-pot clavulanate, Augmentin, Codeine, Penicillins, Phenobarbital, and Seasonal allergies    Physical Exam:  On physical examination the patient appears the stated age, is in no acute distress, affect is appropriate, and breathing is non-labored.  There were no vitals taken for this visit.  There is no height or weight on file to calculate BMI.    Assessment: right hip advanced osteoarthritis associated with severe symptoms that are severely limited her physical function. We discussed the diagnosis and the treatment options including living with it, additional non-operative management, and arthroplasty.  We spent twenty minutes discussing total hip arthroplasty.  We discussed the implants, the procedure, the risks and benefits, and the post-operative course.  We discussed blood clots, blood clots to the lungs, injury to blood vessels and nerves, dislocation, infection, and leg length difference.  Reviewed templated radiographs. We discussed that as part of the routine part of surgical care a qualified assist may finish closing the wound after I have left the room. All the patients questions were answered to the best of my ability.    Has history of DVT and would like a  stronger agent than ASA.    Plan: right total hip when the patient chooses to go forward with it. PAC visit.     No ref. provider found    Sincerely,        Thomas Shannon MD

## 2023-02-16 NOTE — TELEPHONE ENCOUNTER
[facility] Call Center    Phone Message    May a detailed message be left on voicemail: yes     Reason for Call: Medication Refill Request    Has the patient contacted the pharmacy for the refill? Yes   Name of medication being requested: oxyCODONE-acetaminophen (PERCOCET) 7.5-325 MG per tablet  Provider who prescribed the medication: Anjali  Pharmacy: [facility]/PHARMACY #[id number] - [place], [place] - [address]    Date medication is needed: ASAP patient is out.         Action Taken: Other: Pain    Travel Screening: Not Applicable

## 2023-02-17 RX ORDER — OXYCODONE AND ACETAMINOPHEN 7.5; 325 MG/1; MG/1
1 TABLET ORAL 2 TIMES DAILY PRN
Qty: 60 TABLET | Refills: 0 | Status: SHIPPED | OUTPATIENT
Start: 2023-02-17 | End: 2023-04-19

## 2023-02-17 NOTE — TELEPHONE ENCOUNTER
Signed Prescriptions:                        Disp   Refills    oxyCODONE-acetaminophen (PERCOCET) 7.5-325*60 tab*0        Sig: Take 1 tablet by mouth 2 times daily as needed for           severe pain (7-10) With at least 4 hours between           doses. Fill and start 2/17/23  Authorizing Provider: OMARI JONES MD  [facility]

## 2023-02-17 NOTE — TELEPHONE ENCOUNTER
Routing to provider to review medication prepped per below      oxyCODONE-acetaminophen (PERCOCET) 7.5-325 MG per tablet #60 Refill:0  Sig:Take 1 tablet by mouth 2 times daily as needed for severe pain (7-10) With at least 4 hours between doses.  Last picked up 1/8/23 with start on 1/8/23 (Per , prep is incorrect)   Due: Fill and start 2/17/23    Per last OV note 12/16/22: Continue Percocet 7.5-325 mg BID prn. She reports minimal pain relief but does not want to make changes at this time. Can consider increase to 10 mg BID prn if pain becomes too severe.       [facility]/pharmacy #[id number] - [place] [address]  Phone: [phone] Fax: [phone]    Ilene SHERIDAN RN Care Coordinator  [facility]

## 2023-02-17 NOTE — TELEPHONE ENCOUNTER
Received call from patient requesting refill(s) oxyCODONE-acetaminophen (PERCOCET) 7.5-325 MG per tablet    Last dispensed from pharmacy on 09/21/2022    Patient's last office/virtual visit by prescribing provider on 12/16/2022  Next office/virtual appointment scheduled for 03/20/2023    Last urine drug screen date 09/22/2022  Current opioid agreement on file (completed within the last year) Yes Date of opioid agreement: 09/21/2022    E-prescribe to   [facility]/PHARMACY #[id number] - [place], [place] - [address], pharmacy    Will route to [facility] for review and preparation of prescription(s).     Dyan Cantrell MA  [facility] [place]

## 2023-02-17 NOTE — TELEPHONE ENCOUNTER
Routed to MA pool to gather required information for opioid refill.    Claudia GREENN, RN Care Coordinator  [facility]  Pain [facility]

## 2023-02-20 NOTE — TELEPHONE ENCOUNTER
E-prescription confirmation received. Patient was informed.   

## 2023-02-23 DIAGNOSIS — Z79.52 CURRENT CHRONIC USE OF SYSTEMIC STEROIDS: ICD-10-CM

## 2023-02-23 DIAGNOSIS — M85.80 OSTEOPENIA, UNSPECIFIED LOCATION: ICD-10-CM

## 2023-02-23 NOTE — PROGRESS NOTES
Chief Complaint: RECHECK (Discuss SRI. Cardiology clearance - LOV 11/14/22)       HPI: Betty Tee returns today in follow-up for her right hip. She reports continued severe symptoms and has become largely wheel chair bound out of the home. Here with Sister Nghia. Has gotten cardiac clearance. Would like to go forward with total hip.    Medications and allergies are documented in the EMR and have been reviewed    Current Outpatient Medications:      ACCU-CHEK SHANNON PLUS test strip, USE TO TEST BLOOD SUGARS 3 TIMES DAILY, Disp: 300 strip, Rfl: 5     acetaminophen (TYLENOL) 500 MG tablet, Take 1,000 mg by mouth every 8 hours as needed (max 6 tablets/24 hours, 2 tablets/dose), Disp: , Rfl:      acyclovir (ZOVIRAX) 400 MG tablet, Take 1 tablet (400 mg) by mouth 3 times daily as needed For a couple days, Disp: 15 tablet, Rfl: 2     acyclovir (ZOVIRAX) 5 % external ointment, Apply topically as needed (6 times day PRN for outbreaks) As needed for outbreaks, Disp: 15 g, Rfl: 3     albuterol (PROAIR HFA/PROVENTIL HFA/VENTOLIN HFA) 108 (90 Base) MCG/ACT inhaler, Inhale 2 puffs into the lungs every 6 hours, Disp: 18 g, Rfl: 11     alendronate (FOSAMAX) 70 MG tablet, TAKE 1 TABLET BY MOUTH EVERY 7 DAYS, Disp: 12 tablet, Rfl: 0     allopurinol (ZYLOPRIM) 100 MG tablet, Take 1 tablet (100 mg) by mouth daily, Disp: 90 tablet, Rfl: 1     anakinra (KINERET) 100 MG/0.67ML SOSY injection, 100 mg every day x 3 days as needed for flare ups, Disp: 6.7 mL, Rfl: 3     aspirin (ASA) 81 MG EC tablet, Take 1 tablet (81 mg) by mouth daily, Disp: 90 tablet, Rfl: 1     atorvastatin (LIPITOR) 10 MG tablet, TAKE 1/2 TABLET BY MOUTH EVERY DAY, Disp: 45 tablet, Rfl: 0     azithromycin (ZITHROMAX) 250 MG tablet, TAKE 1 TABLET BY MOUTH EVERY DAY, Disp: 30 tablet, Rfl: 5     BD PEN NEEDLE JANE 2ND GEN 32G X 4 MM miscellaneous, USE TO TEST 4 TIMES A DAY, Disp: 400 each, Rfl: 1     blood glucose (NO BRAND SPECIFIED) lancets standard, Use to test  blood sugar 3 times daily or as directed, Disp: 100 lancet, Rfl: 3     cevimeline (EVOXAC) 30 MG capsule, Take 1 capsule (30 mg) by mouth 3 times daily, Disp: 90 capsule, Rfl: 11     Cholecalciferol (VITAMIN D3) 50 MCG (2000 UT) CAPS, TAKE 100 MCG BY MOUTH DAILY (TAKE 2 TABLET (50 MCG) BY MOUTH DAILY - ORAL), Disp: 180 capsule, Rfl: 0     clopidogrel (PLAVIX) 75 MG tablet, Take 1 tablet (75 mg) by mouth daily, Disp: 90 tablet, Rfl: 1     COMPOUNDED NON-CONTROLLED SUBSTANCE (CMPD RX) - PHARMACY TO MIX COMPOUNDED MEDICATION, Estriol 1 mg/g Apply small amount to finger and apply to inside vagina daily for 2 weeks then twice weekly Route: vaginally Dispense 30 grams 11 refills, Disp: 30 g, Rfl: 11     cyanocobalamin (VITAMIN B-12) 1000 MCG tablet, Take 1 tablet (1,000 mcg) by mouth daily, Disp: 30 tablet, Rfl: 1     escitalopram (LEXAPRO) 20 MG tablet, TAKE 1 TABLET BY MOUTH EVERY DAY, Disp: 90 tablet, Rfl: 0     ferrous gluconate (FERGON) 324 (38 Fe) MG tablet, Take 1 tablet (324 mg) by mouth daily (with breakfast), Disp: 90 tablet, Rfl: 1     ferrous sulfate (FEROSUL) 325 (65 Fe) MG tablet, TAKE 1 TABLET BY MOUTH EVERY DAY WITH BREAKFAST, Disp: 90 tablet, Rfl: 0     fluticasone (FLONASE) 50 MCG/ACT nasal spray, SPRAY 1-2 SPRAYS INTO BOTH NOSTRILS DAILY AS NEEDED FOR ALLERGIES, Disp: 16 mL, Rfl: 11     fluticasone-vilanterol (BREO ELLIPTA) 100-25 MCG/INH inhaler, Inhale 1 puff into the lungs daily, Disp: 1 each, Rfl: 11     hydroxychloroquine (PLAQUENIL) 200 MG tablet, Take 2 tablets (400 mg) by mouth daily Get annual eye exams for hydroxychloroquine (Plaquenil) monitoring and fax to [phone], Disp: 180 tablet, Rfl: 3     insulin glargine (BASAGLAR KWIKPEN) 100 UNIT/ML pen, 3INJECT 24 UNITS SUBCUTANEOUS DAILY (TO REPLACE LANTUS), Disp: 15 mL, Rfl: 1     insulin lispro (HUMALOG KWIKPEN) 100 UNIT/ML (1 unit dial) JOANNA, Inject 12 Units Subcutaneous 3 times daily (before meals) (12 units plus 2 unit prime x 3 doses  = 42 units/day), Disp: 45 mL, Rfl: 1     ketoconazole (NIZORAL) 2 % external cream, Apply topically 2 times daily as needed for itching, Disp: 60 g, Rfl: 1     KLOR-CON 20 MEQ CR tablet, TAKE 2 TABLETS (40 MEQ) BY MOUTH EVERY MORNING AND 1 TABLET (20 MEQ) EVERY EVENING., Disp: 270 tablet, Rfl: 3     lidocaine (LIDODERM) 5 % patch, APPLY PATCH TO PAINFUL AREA FOR UP TO 12 H WITHIN A 24 H PERIOD. REMOVE AFTER 12 HOURS. (Patient taking differently: Apply patch to painful area for up to 12 h within a 24 h period.  Remove after 12 hours.), Disp: 30 patch, Rfl: 2     loratadine (CLARITIN) 10 MG tablet, TAKE 1 TABLET BY MOUTH EVERY DAY, Disp: 90 tablet, Rfl: 3     methocarbamol (ROBAXIN) 750 MG tablet, Take 1 tablet (750 mg) by mouth 3 times daily as needed for muscle spasms, Disp: 90 tablet, Rfl: 3     metoprolol succinate ER (TOPROL XL) 50 MG 24 hr tablet, Take 1 tablet (50 mg) by mouth 2 times daily, Disp: 90 tablet, Rfl: 3     montelukast (SINGULAIR) 10 MG tablet, TAKE 1 TABLET BY MOUTH EVERYDAY AT BEDTIME, Disp: 90 tablet, Rfl: 0     oxyCODONE-acetaminophen (PERCOCET) 5-325 MG tablet, Take 1 tablet by mouth 2 times daily as needed for pain May fill and start on 9/21/2022. For chronic pain., Disp: 20 tablet, Rfl: 0     oxyCODONE-acetaminophen (PERCOCET) 7.5-325 MG per tablet, Take 1 tablet by mouth 2 times daily as needed for severe pain (7-10) With at least 4 hours between doses. Fill and start 2/17/23, Disp: 60 tablet, Rfl: 0     OZEMPIC, 1 MG/DOSE, 4 MG/3ML pen, INJECT 1 MG SUBCUTANEOUS ONCE A WEEK, Disp: 3 mL, Rfl: 1     pantoprazole (PROTONIX) 40 MG EC tablet, Take 1 tablet (40 mg) by mouth daily (replaces famotidine- should stop taking famotidine), Disp: 90 tablet, Rfl: 3     predniSONE (DELTASONE) 5 MG tablet, Take 1 tablet (5 mg) by mouth daily, Disp: 90 tablet, Rfl: 1     spironolactone (ALDACTONE) 25 MG tablet, Take 2 tablets (50 mg) by mouth daily, Disp: 180 tablet, Rfl: 3     torsemide (DEMADEX) 10 MG  tablet, TAKE ONE TAB (10 MG) WITH ONE 20 MG TAB TO = 30 MG DAILY IN AFTERNOON., Disp: 90 tablet, Rfl: 3     torsemide (DEMADEX) 20 MG tablet, TAKE 2 TABLETS (40 MG) BY MOUTH EVERY MORNING AND 1 TABLET (20 MG) DAILY AT 2 PM. TAKE THE AFTERNOON DOSE WITH AN EXTRA 10 MG TAB FOR A TOTAL OF 30 MG IN THE AFTERNOON, Disp: 270 tablet, Rfl: 3     traZODone (DESYREL) 50 MG tablet, TAKE 3 TABLETS (150 MG) BY MOUTH NIGHTLY AS NEEDED FOR SLEEP, Disp: 270 tablet, Rfl: 0  Allergies: Amoxicillin-pot clavulanate, Augmentin, Codeine, Penicillins, Phenobarbital, and Seasonal allergies    Physical Exam:  On physical examination the patient appears the stated age, is in no acute distress, affect is appropriate, and breathing is non-labored.  There were no vitals taken for this visit.  There is no height or weight on file to calculate BMI.    Assessment: right hip advanced osteoarthritis associated with severe symptoms that are severely limited her physical function. We discussed the diagnosis and the treatment options including living with it, additional non-operative management, and arthroplasty.  We spent twenty minutes discussing total hip arthroplasty.  We discussed the implants, the procedure, the risks and benefits, and the post-operative course.  We discussed blood clots, blood clots to the lungs, injury to blood vessels and nerves, dislocation, infection, and leg length difference.  Reviewed templated radiographs. We discussed that as part of the routine part of surgical care a qualified assist may finish closing the wound after I have left the room. All the patients questions were answered to the best of my ability.    Has history of DVT and would like a stronger agent than ASA.    Plan: right total hip when the patient chooses to go forward with it. PAC visit.     No ref. provider found

## 2023-02-24 ENCOUNTER — TELEPHONE (OUTPATIENT)
Dept: ORTHOPEDICS | Facility: CLINIC | Age: 83
End: 2023-02-24

## 2023-02-24 NOTE — TELEPHONE ENCOUNTER
Date Scheduled: 5-31-23  Facility: Surgery Locations: [facility]  Surgeon: Dr. Shannon   Post-op appointment scheduled:    scheduled?: No  Surgery packet/instructions confirmed received?  Yes  Pre op physical/PAC appointment:   Special Considerations:     Added to move up list to get in sooner. Call Friend Nghia with all information.        Maegan Casas  Surgery Scheduling Coordinator  [phone]

## 2023-02-24 NOTE — TELEPHONE ENCOUNTER
alendronate (FOSAMAX) 70 MG tablet    Last Written Prescription Date:  12-  Last Fill Quantity: 12,   # refills: 0  Last Office Visit : 01-  Future Office visit:  07-    Routing refill request to provider for review/approval because:    Bisphosphonates Failed 02/23/2023 12:25 AM   Protocol Details  Dexa on file within past 2 years    Normal serum creatinine on file within past 12 months     Recent Labs   Lab Test 01/13/23  1656 04/03/17  0723 04/02/17  2213   CR 1.04*   < >  --    CREAT  --   --  0.6     No DEXA found in Care everywhere or under media.

## 2023-02-24 NOTE — TELEPHONE ENCOUNTER
Message left for friend Nghia to call back to get surgery scheduled with Dr. Shannon.  Maegan Casas  Surgery Scheduling Coordinator  Ph: [phone]

## 2023-02-26 RX ORDER — ALENDRONATE SODIUM 70 MG/1
70 TABLET ORAL
Qty: 4 TABLET | Refills: 1 | Status: SHIPPED | OUTPATIENT
Start: 2023-02-26 | End: 2023-04-24

## 2023-03-07 ENCOUNTER — APPOINTMENT (OUTPATIENT)
Dept: GENERAL RADIOLOGY | Facility: CLINIC | Age: 83
DRG: 392 | End: 2023-03-07
Attending: EMERGENCY MEDICINE
Payer: MEDICARE

## 2023-03-07 ENCOUNTER — HOSPITAL ENCOUNTER (INPATIENT)
Facility: CLINIC | Age: 83
LOS: 2 days | Discharge: HOME | DRG: 392 | End: 2023-03-09
Attending: EMERGENCY MEDICINE | Admitting: HOSPITALIST
Payer: MEDICARE

## 2023-03-07 ENCOUNTER — APPOINTMENT (OUTPATIENT)
Dept: CT IMAGING | Facility: CLINIC | Age: 83
DRG: 392 | End: 2023-03-07
Attending: EMERGENCY MEDICINE
Payer: MEDICARE

## 2023-03-07 DIAGNOSIS — Z11.52 ENCOUNTER FOR SCREENING LABORATORY TESTING FOR SEVERE ACUTE RESPIRATORY SYNDROME CORONAVIRUS 2 (SARS-COV-2): ICD-10-CM

## 2023-03-07 DIAGNOSIS — Z79.4 TYPE 2 DIABETES MELLITUS WITH HYPERGLYCEMIA, WITH LONG-TERM CURRENT USE OF INSULIN (H): ICD-10-CM

## 2023-03-07 DIAGNOSIS — E86.0 DEHYDRATION: ICD-10-CM

## 2023-03-07 DIAGNOSIS — D50.9 IRON DEFICIENCY ANEMIA, UNSPECIFIED IRON DEFICIENCY ANEMIA TYPE: ICD-10-CM

## 2023-03-07 DIAGNOSIS — E11.65 TYPE 2 DIABETES MELLITUS WITH HYPERGLYCEMIA, WITH LONG-TERM CURRENT USE OF INSULIN (H): ICD-10-CM

## 2023-03-07 DIAGNOSIS — K52.9 GASTROENTERITIS: ICD-10-CM

## 2023-03-07 DIAGNOSIS — J44.89 FOLLICULAR BRONCHIOLITIS (H): Primary | ICD-10-CM

## 2023-03-07 DIAGNOSIS — E87.20 LACTIC ACIDOSIS: ICD-10-CM

## 2023-03-07 LAB
ALBUMIN SERPL BCG-MCNC: 4.1 G/DL (ref 3.5–5.2)
ALBUMIN UR-MCNC: 30 MG/DL
ALP SERPL-CCNC: 80 U/L (ref 35–104)
ALT SERPL W P-5'-P-CCNC: 11 U/L (ref 10–35)
ANION GAP SERPL CALCULATED.3IONS-SCNC: 12 MMOL/L (ref 7–15)
ANION GAP SERPL CALCULATED.3IONS-SCNC: 20 MMOL/L (ref 7–15)
APPEARANCE UR: CLEAR
AST SERPL W P-5'-P-CCNC: 21 U/L (ref 10–35)
ATRIAL RATE - MUSE: 65 BPM
B-OH-BUTYR SERPL-SCNC: 0.4 MMOL/L
BASE EXCESS BLDV CALC-SCNC: -2 MMOL/L (ref -7.7–1.9)
BASOPHILS # BLD AUTO: 0 10E3/UL (ref 0–0.2)
BASOPHILS NFR BLD AUTO: 0 %
BILIRUB SERPL-MCNC: 0.7 MG/DL
BILIRUB UR QL STRIP: NEGATIVE
BUN SERPL-MCNC: 23.5 MG/DL (ref 8–23)
BUN SERPL-MCNC: 25.8 MG/DL (ref 8–23)
C COLI+JEJUNI+LARI FUSA STL QL NAA+PROBE: NOT DETECTED
C DIFF TOX B STL QL: NEGATIVE
CALCIUM SERPL-MCNC: 7.5 MG/DL (ref 8.8–10.2)
CALCIUM SERPL-MCNC: 9 MG/DL (ref 8.8–10.2)
CHLORIDE SERPL-SCNC: 101 MMOL/L (ref 98–107)
CHLORIDE SERPL-SCNC: 110 MMOL/L (ref 98–107)
COLOR UR AUTO: ABNORMAL
CREAT SERPL-MCNC: 1.18 MG/DL (ref 0.51–0.95)
CREAT SERPL-MCNC: 1.51 MG/DL (ref 0.51–0.95)
CRP SERPL-MCNC: 42.7 MG/L
DEPRECATED HCO3 PLAS-SCNC: 18 MMOL/L (ref 22–29)
DEPRECATED HCO3 PLAS-SCNC: 19 MMOL/L (ref 22–29)
DIASTOLIC BLOOD PRESSURE - MUSE: NORMAL MMHG
EC STX1 GENE STL QL NAA+PROBE: NOT DETECTED
EC STX2 GENE STL QL NAA+PROBE: NOT DETECTED
EOSINOPHIL # BLD AUTO: 0.1 10E3/UL (ref 0–0.7)
EOSINOPHIL NFR BLD AUTO: 1 %
ERYTHROCYTE [DISTWIDTH] IN BLOOD BY AUTOMATED COUNT: 15.7 % (ref 10–15)
GFR SERPL CREATININE-BSD FRML MDRD: 34 ML/MIN/1.73M2
GFR SERPL CREATININE-BSD FRML MDRD: 46 ML/MIN/1.73M2
GLUCOSE BLDC GLUCOMTR-MCNC: 105 MG/DL (ref 70–99)
GLUCOSE BLDC GLUCOMTR-MCNC: 144 MG/DL (ref 70–99)
GLUCOSE BLDC GLUCOMTR-MCNC: 156 MG/DL (ref 70–99)
GLUCOSE BLDC GLUCOMTR-MCNC: 228 MG/DL (ref 70–99)
GLUCOSE SERPL-MCNC: 141 MG/DL (ref 70–99)
GLUCOSE SERPL-MCNC: 300 MG/DL (ref 70–99)
GLUCOSE UR STRIP-MCNC: 50 MG/DL
HCO3 BLDV-SCNC: 25 MMOL/L (ref 21–28)
HCT VFR BLD AUTO: 49.7 % (ref 35–47)
HGB BLD-MCNC: 16 G/DL (ref 11.7–15.7)
HGB UR QL STRIP: ABNORMAL
IMM GRANULOCYTES # BLD: 0 10E3/UL
IMM GRANULOCYTES NFR BLD: 1 %
INTERPRETATION ECG - MUSE: NORMAL
KETONES UR STRIP-MCNC: NEGATIVE MG/DL
LACTATE SERPL-SCNC: 1.6 MMOL/L (ref 0.7–2)
LACTATE SERPL-SCNC: 2.9 MMOL/L (ref 0.7–2)
LACTATE SERPL-SCNC: 3.8 MMOL/L (ref 0.7–2)
LACTATE SERPL-SCNC: 3.9 MMOL/L (ref 0.7–2)
LACTATE SERPL-SCNC: 4.1 MMOL/L (ref 0.7–2)
LEUKOCYTE ESTERASE UR QL STRIP: NEGATIVE
LIPASE SERPL-CCNC: 21 U/L (ref 13–60)
LYMPHOCYTES # BLD AUTO: 0.1 10E3/UL (ref 0.8–5.3)
LYMPHOCYTES NFR BLD AUTO: 1 %
MAGNESIUM SERPL-MCNC: 1.9 MG/DL (ref 1.7–2.3)
MCH RBC QN AUTO: 28.6 PG (ref 26.5–33)
MCHC RBC AUTO-ENTMCNC: 32.2 G/DL (ref 31.5–36.5)
MCV RBC AUTO: 89 FL (ref 78–100)
MONOCYTES # BLD AUTO: 0.8 10E3/UL (ref 0–1.3)
MONOCYTES NFR BLD AUTO: 10 %
NEUTROPHILS # BLD AUTO: 7.6 10E3/UL (ref 1.6–8.3)
NEUTROPHILS NFR BLD AUTO: 87 %
NITRATE UR QL: NEGATIVE
NOROV GI+II ORF1-ORF2 JNC STL QL NAA+PR: DETECTED
NRBC # BLD AUTO: 0 10E3/UL
NRBC BLD AUTO-RTO: 0 /100
O2/TOTAL GAS SETTING VFR VENT: 0 %
P AXIS - MUSE: NORMAL DEGREES
PCO2 BLDV: 52 MM HG (ref 40–50)
PH BLDV: 7.29 [PH] (ref 7.32–7.43)
PH UR STRIP: 5.5 [PH] (ref 5–7)
PHOSPHATE SERPL-MCNC: 2.5 MG/DL (ref 2.5–4.5)
PLATELET # BLD AUTO: 220 10E3/UL (ref 150–450)
PO2 BLDV: 24 MM HG (ref 25–47)
POTASSIUM SERPL-SCNC: 3.5 MMOL/L (ref 3.4–5.3)
POTASSIUM SERPL-SCNC: 3.8 MMOL/L (ref 3.4–5.3)
PR INTERVAL - MUSE: 202 MS
PROCALCITONIN SERPL IA-MCNC: 13.1 NG/ML
PROT SERPL-MCNC: 7.3 G/DL (ref 6.4–8.3)
QRS DURATION - MUSE: 76 MS
QT - MUSE: 562 MS
QTC - MUSE: 584 MS
R AXIS - MUSE: 92 DEGREES
RBC # BLD AUTO: 5.59 10E6/UL (ref 3.8–5.2)
RBC URINE: 5 /HPF
RVA NSP5 STL QL NAA+PROBE: NOT DETECTED
SALMONELLA SP RPOD STL QL NAA+PROBE: NOT DETECTED
SARS-COV-2 RNA RESP QL NAA+PROBE: NEGATIVE
SHIGELLA SP+EIEC IPAH STL QL NAA+PROBE: NOT DETECTED
SODIUM SERPL-SCNC: 140 MMOL/L (ref 136–145)
SODIUM SERPL-SCNC: 140 MMOL/L (ref 136–145)
SP GR UR STRIP: 1.02 (ref 1–1.03)
SQUAMOUS EPITHELIAL: <1 /HPF
SYSTOLIC BLOOD PRESSURE - MUSE: NORMAL MMHG
T AXIS - MUSE: 126 DEGREES
TRANSITIONAL EPI: <1 /HPF
UROBILINOGEN UR STRIP-MCNC: NORMAL MG/DL
V CHOL+PARA RFBL+TRKH+TNAA STL QL NAA+PR: NOT DETECTED
VENTRICULAR RATE- MUSE: 65 BPM
WBC # BLD AUTO: 8.6 10E3/UL (ref 4–11)
WBC URINE: 8 /HPF
Y ENTERO RECN STL QL NAA+PROBE: NOT DETECTED

## 2023-03-07 PROCEDURE — 250N000011 HC RX IP 250 OP 636: Performed by: EMERGENCY MEDICINE

## 2023-03-07 PROCEDURE — 82310 ASSAY OF CALCIUM: CPT | Performed by: HOSPITALIST

## 2023-03-07 PROCEDURE — 74177 CT ABD & PELVIS W/CONTRAST: CPT | Mod: 26 | Performed by: RADIOLOGY

## 2023-03-07 PROCEDURE — 258N000003 HC RX IP 258 OP 636: Performed by: EMERGENCY MEDICINE

## 2023-03-07 PROCEDURE — 87040 BLOOD CULTURE FOR BACTERIA: CPT | Performed by: EMERGENCY MEDICINE

## 2023-03-07 PROCEDURE — U0003 INFECTIOUS AGENT DETECTION BY NUCLEIC ACID (DNA OR RNA); SEVERE ACUTE RESPIRATORY SYNDROME CORONAVIRUS 2 (SARS-COV-2) (CORONAVIRUS DISEASE [COVID-19]), AMPLIFIED PROBE TECHNIQUE, MAKING USE OF HIGH THROUGHPUT TECHNOLOGIES AS DESCRIBED BY CMS-2020-01-R: HCPCS | Performed by: EMERGENCY MEDICINE

## 2023-03-07 PROCEDURE — 36415 COLL VENOUS BLD VENIPUNCTURE: CPT | Performed by: EMERGENCY MEDICINE

## 2023-03-07 PROCEDURE — 82962 GLUCOSE BLOOD TEST: CPT

## 2023-03-07 PROCEDURE — 83735 ASSAY OF MAGNESIUM: CPT | Performed by: HOSPITALIST

## 2023-03-07 PROCEDURE — 250N000013 HC RX MED GY IP 250 OP 250 PS 637: Performed by: EMERGENCY MEDICINE

## 2023-03-07 PROCEDURE — 74177 CT ABD & PELVIS W/CONTRAST: CPT | Mod: MG

## 2023-03-07 PROCEDURE — 999N000157 HC STATISTIC RCP TIME EA 10 MIN

## 2023-03-07 PROCEDURE — G1010 CDSM STANSON: HCPCS

## 2023-03-07 PROCEDURE — 120N000002 HC R&B MED SURG/OB UMMC

## 2023-03-07 PROCEDURE — 96374 THER/PROPH/DIAG INJ IV PUSH: CPT | Mod: 59 | Performed by: EMERGENCY MEDICINE

## 2023-03-07 PROCEDURE — 82803 BLOOD GASES ANY COMBINATION: CPT | Performed by: EMERGENCY MEDICINE

## 2023-03-07 PROCEDURE — 96361 HYDRATE IV INFUSION ADD-ON: CPT | Performed by: EMERGENCY MEDICINE

## 2023-03-07 PROCEDURE — 250N000011 HC RX IP 250 OP 636: Performed by: HOSPITALIST

## 2023-03-07 PROCEDURE — 84145 PROCALCITONIN (PCT): CPT | Performed by: NURSE PRACTITIONER

## 2023-03-07 PROCEDURE — 86140 C-REACTIVE PROTEIN: CPT | Performed by: NURSE PRACTITIONER

## 2023-03-07 PROCEDURE — 36415 COLL VENOUS BLD VENIPUNCTURE: CPT | Performed by: NURSE PRACTITIONER

## 2023-03-07 PROCEDURE — 250N000013 HC RX MED GY IP 250 OP 250 PS 637: Performed by: NURSE PRACTITIONER

## 2023-03-07 PROCEDURE — 84100 ASSAY OF PHOSPHORUS: CPT | Performed by: HOSPITALIST

## 2023-03-07 PROCEDURE — 99291 CRITICAL CARE FIRST HOUR: CPT | Performed by: EMERGENCY MEDICINE

## 2023-03-07 PROCEDURE — 83605 ASSAY OF LACTIC ACID: CPT | Performed by: NURSE PRACTITIONER

## 2023-03-07 PROCEDURE — 83605 ASSAY OF LACTIC ACID: CPT | Performed by: EMERGENCY MEDICINE

## 2023-03-07 PROCEDURE — 99223 1ST HOSP IP/OBS HIGH 75: CPT | Mod: AI | Performed by: HOSPITALIST

## 2023-03-07 PROCEDURE — G1010 CDSM STANSON: HCPCS | Performed by: RADIOLOGY

## 2023-03-07 PROCEDURE — C9803 HOPD COVID-19 SPEC COLLECT: HCPCS | Performed by: EMERGENCY MEDICINE

## 2023-03-07 PROCEDURE — 82010 KETONE BODYS QUAN: CPT | Performed by: EMERGENCY MEDICINE

## 2023-03-07 PROCEDURE — 99285 EMERGENCY DEPT VISIT HI MDM: CPT | Mod: 25 | Performed by: EMERGENCY MEDICINE

## 2023-03-07 PROCEDURE — 71046 X-RAY EXAM CHEST 2 VIEWS: CPT | Mod: 26 | Performed by: RADIOLOGY

## 2023-03-07 PROCEDURE — 87506 IADNA-DNA/RNA PROBE TQ 6-11: CPT | Performed by: EMERGENCY MEDICINE

## 2023-03-07 PROCEDURE — 81001 URINALYSIS AUTO W/SCOPE: CPT | Performed by: EMERGENCY MEDICINE

## 2023-03-07 PROCEDURE — 85025 COMPLETE CBC W/AUTO DIFF WBC: CPT | Performed by: EMERGENCY MEDICINE

## 2023-03-07 PROCEDURE — 80053 COMPREHEN METABOLIC PANEL: CPT | Performed by: EMERGENCY MEDICINE

## 2023-03-07 PROCEDURE — 83690 ASSAY OF LIPASE: CPT | Performed by: EMERGENCY MEDICINE

## 2023-03-07 PROCEDURE — 71046 X-RAY EXAM CHEST 2 VIEWS: CPT

## 2023-03-07 PROCEDURE — 87493 C DIFF AMPLIFIED PROBE: CPT | Performed by: EMERGENCY MEDICINE

## 2023-03-07 PROCEDURE — 250N000012 HC RX MED GY IP 250 OP 636 PS 637: Performed by: NURSE PRACTITIONER

## 2023-03-07 PROCEDURE — 258N000003 HC RX IP 258 OP 636: Performed by: NURSE PRACTITIONER

## 2023-03-07 PROCEDURE — 250N000011 HC RX IP 250 OP 636: Performed by: NURSE PRACTITIONER

## 2023-03-07 RX ORDER — LIDOCAINE 40 MG/G
CREAM TOPICAL
Status: DISCONTINUED | OUTPATIENT
Start: 2023-03-07 | End: 2023-03-09 | Stop reason: HOSPADM

## 2023-03-07 RX ORDER — PREDNISONE 5 MG/1
5 TABLET ORAL DAILY
Status: DISCONTINUED | OUTPATIENT
Start: 2023-03-07 | End: 2023-03-09 | Stop reason: HOSPADM

## 2023-03-07 RX ORDER — ACETAMINOPHEN 325 MG/1
650 TABLET ORAL ONCE
Status: COMPLETED | OUTPATIENT
Start: 2023-03-07 | End: 2023-03-07

## 2023-03-07 RX ORDER — AZITHROMYCIN 250 MG/1
250 TABLET, FILM COATED ORAL DAILY
Status: DISCONTINUED | OUTPATIENT
Start: 2023-03-07 | End: 2023-03-09 | Stop reason: HOSPADM

## 2023-03-07 RX ORDER — ONDANSETRON 4 MG/1
4 TABLET, ORALLY DISINTEGRATING ORAL EVERY 6 HOURS PRN
Status: DISCONTINUED | OUTPATIENT
Start: 2023-03-07 | End: 2023-03-09 | Stop reason: HOSPADM

## 2023-03-07 RX ORDER — ALBUTEROL SULFATE 90 UG/1
2 AEROSOL, METERED RESPIRATORY (INHALATION) EVERY 6 HOURS
Status: DISCONTINUED | OUTPATIENT
Start: 2023-03-07 | End: 2023-03-09 | Stop reason: HOSPADM

## 2023-03-07 RX ORDER — ESCITALOPRAM OXALATE 10 MG/1
20 TABLET ORAL DAILY
Status: DISCONTINUED | OUTPATIENT
Start: 2023-03-07 | End: 2023-03-09 | Stop reason: HOSPADM

## 2023-03-07 RX ORDER — VANCOMYCIN HYDROCHLORIDE 1 G/200ML
1000 INJECTION, SOLUTION INTRAVENOUS EVERY 24 HOURS
Status: DISCONTINUED | OUTPATIENT
Start: 2023-03-08 | End: 2023-03-07

## 2023-03-07 RX ORDER — CEVIMELINE HYDROCHLORIDE 30 MG/1
30 CAPSULE ORAL 3 TIMES DAILY
Status: DISCONTINUED | OUTPATIENT
Start: 2023-03-07 | End: 2023-03-09 | Stop reason: HOSPADM

## 2023-03-07 RX ORDER — NICOTINE POLACRILEX 4 MG
15-30 LOZENGE BUCCAL
Status: DISCONTINUED | OUTPATIENT
Start: 2023-03-07 | End: 2023-03-09 | Stop reason: HOSPADM

## 2023-03-07 RX ORDER — PANTOPRAZOLE SODIUM 40 MG/1
40 TABLET, DELAYED RELEASE ORAL DAILY
Status: DISCONTINUED | OUTPATIENT
Start: 2023-03-07 | End: 2023-03-09 | Stop reason: HOSPADM

## 2023-03-07 RX ORDER — IOPAMIDOL 755 MG/ML
104 INJECTION, SOLUTION INTRAVASCULAR ONCE
Status: COMPLETED | OUTPATIENT
Start: 2023-03-07 | End: 2023-03-07

## 2023-03-07 RX ORDER — ACETAMINOPHEN 500 MG
1000 TABLET ORAL EVERY 8 HOURS PRN
Status: DISCONTINUED | OUTPATIENT
Start: 2023-03-07 | End: 2023-03-09 | Stop reason: HOSPADM

## 2023-03-07 RX ORDER — ONDANSETRON 2 MG/ML
4 INJECTION INTRAMUSCULAR; INTRAVENOUS ONCE
Status: COMPLETED | OUTPATIENT
Start: 2023-03-07 | End: 2023-03-07

## 2023-03-07 RX ORDER — ONDANSETRON 2 MG/ML
4 INJECTION INTRAMUSCULAR; INTRAVENOUS EVERY 6 HOURS PRN
Status: DISCONTINUED | OUTPATIENT
Start: 2023-03-07 | End: 2023-03-09 | Stop reason: HOSPADM

## 2023-03-07 RX ORDER — ASPIRIN 81 MG/1
81 TABLET ORAL DAILY
Status: DISCONTINUED | OUTPATIENT
Start: 2023-03-07 | End: 2023-03-09 | Stop reason: HOSPADM

## 2023-03-07 RX ORDER — LORATADINE 10 MG/1
10 TABLET ORAL DAILY
Status: DISCONTINUED | OUTPATIENT
Start: 2023-03-07 | End: 2023-03-09 | Stop reason: HOSPADM

## 2023-03-07 RX ORDER — NALOXONE HYDROCHLORIDE 0.4 MG/ML
0.4 INJECTION, SOLUTION INTRAMUSCULAR; INTRAVENOUS; SUBCUTANEOUS
Status: DISCONTINUED | OUTPATIENT
Start: 2023-03-07 | End: 2023-03-09 | Stop reason: HOSPADM

## 2023-03-07 RX ORDER — ALLOPURINOL 100 MG/1
100 TABLET ORAL DAILY
Status: DISCONTINUED | OUTPATIENT
Start: 2023-03-07 | End: 2023-03-07

## 2023-03-07 RX ORDER — DEXTROSE MONOHYDRATE 25 G/50ML
25-50 INJECTION, SOLUTION INTRAVENOUS
Status: DISCONTINUED | OUTPATIENT
Start: 2023-03-07 | End: 2023-03-09 | Stop reason: HOSPADM

## 2023-03-07 RX ORDER — ENOXAPARIN SODIUM 100 MG/ML
40 INJECTION SUBCUTANEOUS EVERY 24 HOURS
Status: DISCONTINUED | OUTPATIENT
Start: 2023-03-07 | End: 2023-03-07

## 2023-03-07 RX ORDER — METOPROLOL SUCCINATE 50 MG/1
50 TABLET, EXTENDED RELEASE ORAL 2 TIMES DAILY
Status: DISCONTINUED | OUTPATIENT
Start: 2023-03-07 | End: 2023-03-09 | Stop reason: HOSPADM

## 2023-03-07 RX ORDER — ALLOPURINOL 100 MG/1
50 TABLET ORAL DAILY
Status: DISCONTINUED | OUTPATIENT
Start: 2023-03-08 | End: 2023-03-09 | Stop reason: HOSPADM

## 2023-03-07 RX ORDER — OXYCODONE AND ACETAMINOPHEN 5; 325 MG/1; MG/1
1 TABLET ORAL 2 TIMES DAILY PRN
Status: DISCONTINUED | OUTPATIENT
Start: 2023-03-07 | End: 2023-03-09 | Stop reason: HOSPADM

## 2023-03-07 RX ORDER — CLOPIDOGREL BISULFATE 75 MG/1
75 TABLET ORAL DAILY
Status: DISCONTINUED | OUTPATIENT
Start: 2023-03-07 | End: 2023-03-09 | Stop reason: HOSPADM

## 2023-03-07 RX ORDER — METHOCARBAMOL 750 MG/1
750 TABLET, FILM COATED ORAL 3 TIMES DAILY PRN
Status: DISCONTINUED | OUTPATIENT
Start: 2023-03-07 | End: 2023-03-09 | Stop reason: HOSPADM

## 2023-03-07 RX ORDER — MORPHINE SULFATE 2 MG/ML
2 INJECTION, SOLUTION INTRAMUSCULAR; INTRAVENOUS EVERY 6 HOURS PRN
Status: DISCONTINUED | OUTPATIENT
Start: 2023-03-07 | End: 2023-03-09 | Stop reason: HOSPADM

## 2023-03-07 RX ORDER — SODIUM CHLORIDE 9 MG/ML
INJECTION, SOLUTION INTRAVENOUS CONTINUOUS
Status: DISCONTINUED | OUTPATIENT
Start: 2023-03-07 | End: 2023-03-08

## 2023-03-07 RX ORDER — VANCOMYCIN HYDROCHLORIDE 1 G/200ML
1000 INJECTION, SOLUTION INTRAVENOUS EVERY 24 HOURS
Status: DISCONTINUED | OUTPATIENT
Start: 2023-03-08 | End: 2023-03-08

## 2023-03-07 RX ORDER — SPIRONOLACTONE 50 MG/1
50 TABLET, FILM COATED ORAL DAILY
Status: DISCONTINUED | OUTPATIENT
Start: 2023-03-07 | End: 2023-03-09 | Stop reason: HOSPADM

## 2023-03-07 RX ORDER — HYDROXYCHLOROQUINE SULFATE 200 MG/1
400 TABLET, FILM COATED ORAL DAILY
Status: DISCONTINUED | OUTPATIENT
Start: 2023-03-07 | End: 2023-03-09 | Stop reason: HOSPADM

## 2023-03-07 RX ORDER — NALOXONE HYDROCHLORIDE 0.4 MG/ML
0.2 INJECTION, SOLUTION INTRAMUSCULAR; INTRAVENOUS; SUBCUTANEOUS
Status: DISCONTINUED | OUTPATIENT
Start: 2023-03-07 | End: 2023-03-09 | Stop reason: HOSPADM

## 2023-03-07 RX ORDER — ENOXAPARIN SODIUM 100 MG/ML
30 INJECTION SUBCUTANEOUS EVERY 24 HOURS
Status: DISCONTINUED | OUTPATIENT
Start: 2023-03-07 | End: 2023-03-07

## 2023-03-07 RX ORDER — MONTELUKAST SODIUM 10 MG/1
10 TABLET ORAL AT BEDTIME
Status: DISCONTINUED | OUTPATIENT
Start: 2023-03-07 | End: 2023-03-09 | Stop reason: HOSPADM

## 2023-03-07 RX ORDER — FLUTICASONE FUROATE AND VILANTEROL 100; 25 UG/1; UG/1
1 POWDER RESPIRATORY (INHALATION) DAILY
Status: DISCONTINUED | OUTPATIENT
Start: 2023-03-07 | End: 2023-03-09 | Stop reason: HOSPADM

## 2023-03-07 RX ORDER — PIPERACILLIN SODIUM, TAZOBACTAM SODIUM 3; .375 G/15ML; G/15ML
3.38 INJECTION, POWDER, LYOPHILIZED, FOR SOLUTION INTRAVENOUS ONCE
Status: COMPLETED | OUTPATIENT
Start: 2023-03-07 | End: 2023-03-07

## 2023-03-07 RX ORDER — PIPERACILLIN SODIUM, TAZOBACTAM SODIUM 3; .375 G/15ML; G/15ML
3.38 INJECTION, POWDER, LYOPHILIZED, FOR SOLUTION INTRAVENOUS EVERY 6 HOURS
Status: DISCONTINUED | OUTPATIENT
Start: 2023-03-07 | End: 2023-03-08

## 2023-03-07 RX ADMIN — FLUTICASONE FUROATE AND VILANTEROL TRIFENATATE 1 PUFF: 100; 25 POWDER RESPIRATORY (INHALATION) at 14:04

## 2023-03-07 RX ADMIN — SODIUM CHLORIDE 500 ML: 9 INJECTION, SOLUTION INTRAVENOUS at 13:46

## 2023-03-07 RX ADMIN — IOPAMIDOL 104 ML: 755 INJECTION, SOLUTION INTRAVENOUS at 05:29

## 2023-03-07 RX ADMIN — PIPERACILLIN AND TAZOBACTAM 3.38 G: 3; .375 INJECTION, POWDER, LYOPHILIZED, FOR SOLUTION INTRAVENOUS at 22:44

## 2023-03-07 RX ADMIN — OXYCODONE HYDROCHLORIDE AND ACETAMINOPHEN 1 TABLET: 5; 325 TABLET ORAL at 12:20

## 2023-03-07 RX ADMIN — VANCOMYCIN HYDROCHLORIDE 1500 MG: 10 INJECTION, POWDER, LYOPHILIZED, FOR SOLUTION INTRAVENOUS at 11:10

## 2023-03-07 RX ADMIN — SODIUM CHLORIDE: 9 INJECTION, SOLUTION INTRAVENOUS at 07:55

## 2023-03-07 RX ADMIN — CLOPIDOGREL BISULFATE 75 MG: 75 TABLET ORAL at 12:21

## 2023-03-07 RX ADMIN — METHOCARBAMOL 750 MG: 750 TABLET ORAL at 12:20

## 2023-03-07 RX ADMIN — PIPERACILLIN AND TAZOBACTAM 3.38 G: 3; .375 INJECTION, POWDER, LYOPHILIZED, FOR SOLUTION INTRAVENOUS at 10:03

## 2023-03-07 RX ADMIN — Medication 5 MG: at 13:45

## 2023-03-07 RX ADMIN — MONTELUKAST 10 MG: 10 TABLET, FILM COATED ORAL at 22:43

## 2023-03-07 RX ADMIN — PIPERACILLIN AND TAZOBACTAM 3.38 G: 3; .375 INJECTION, POWDER, LYOPHILIZED, FOR SOLUTION INTRAVENOUS at 15:52

## 2023-03-07 RX ADMIN — ALBUTEROL SULFATE 2 PUFF: 90 AEROSOL, METERED RESPIRATORY (INHALATION) at 13:44

## 2023-03-07 RX ADMIN — SODIUM CHLORIDE 1000 ML: 9 INJECTION, SOLUTION INTRAVENOUS at 05:56

## 2023-03-07 RX ADMIN — ESCITALOPRAM OXALATE 20 MG: 10 TABLET ORAL at 12:20

## 2023-03-07 RX ADMIN — PANTOPRAZOLE SODIUM 40 MG: 40 TABLET, DELAYED RELEASE ORAL at 12:21

## 2023-03-07 RX ADMIN — ONDANSETRON 4 MG: 2 INJECTION INTRAMUSCULAR; INTRAVENOUS at 04:07

## 2023-03-07 RX ADMIN — HYDROXYCHLOROQUINE SULFATE 400 MG: 200 TABLET, FILM COATED ORAL at 13:44

## 2023-03-07 RX ADMIN — ACETAMINOPHEN 650 MG: 325 TABLET ORAL at 08:28

## 2023-03-07 RX ADMIN — LORATADINE 10 MG: 10 TABLET ORAL at 13:44

## 2023-03-07 RX ADMIN — ALBUTEROL SULFATE 2 PUFF: 90 AEROSOL, METERED RESPIRATORY (INHALATION) at 20:24

## 2023-03-07 RX ADMIN — MORPHINE SULFATE 2 MG: 2 INJECTION, SOLUTION INTRAMUSCULAR; INTRAVENOUS at 16:32

## 2023-03-07 RX ADMIN — ASPIRIN 81 MG: 81 TABLET ORAL at 12:20

## 2023-03-07 RX ADMIN — INSULIN ASPART 1 UNITS: 100 INJECTION, SOLUTION INTRAVENOUS; SUBCUTANEOUS at 13:45

## 2023-03-07 RX ADMIN — PREDNISONE 5 MG: 5 TABLET ORAL at 12:21

## 2023-03-07 RX ADMIN — SODIUM CHLORIDE 1000 ML: 9 INJECTION, SOLUTION INTRAVENOUS at 04:07

## 2023-03-07 RX ADMIN — INSULIN ASPART 1 UNITS: 100 INJECTION, SOLUTION INTRAVENOUS; SUBCUTANEOUS at 18:39

## 2023-03-07 ASSESSMENT — ACTIVITIES OF DAILY LIVING (ADL)
CHANGE_IN_FUNCTIONAL_STATUS_SINCE_ONSET_OF_CURRENT_ILLNESS/INJURY: YES
ADLS_ACUITY_SCORE: 29
ADLS_ACUITY_SCORE: 37
ADLS_ACUITY_SCORE: 37
DOING_ERRANDS_INDEPENDENTLY_DIFFICULTY: YES
FALL_HISTORY_WITHIN_LAST_SIX_MONTHS: YES
EQUIPMENT_CURRENTLY_USED_AT_HOME: WALKER, STANDARD
ADLS_ACUITY_SCORE: 37
ADLS_ACUITY_SCORE: 37
TOILETING: 1-->ASSISTANCE (EQUIPMENT/PERSON) NEEDED
TOILETING_ASSISTANCE: TOILETING DIFFICULTY, ASSISTANCE 1 PERSON
ADLS_ACUITY_SCORE: 27
ADLS_ACUITY_SCORE: 29
TOILETING: 0-->INDEPENDENT
WEAR_GLASSES_OR_BLIND: YES
VISION_MANAGEMENT: GLASSES
ADLS_ACUITY_SCORE: 37
TOILETING_ISSUES: YES
ADLS_ACUITY_SCORE: 29
DIFFICULTY_EATING/SWALLOWING: NO
WALKING_OR_CLIMBING_STAIRS_DIFFICULTY: NO
CONCENTRATING,_REMEMBERING_OR_MAKING_DECISIONS_DIFFICULTY: NO
NUMBER_OF_TIMES_PATIENT_HAS_FALLEN_WITHIN_LAST_SIX_MONTHS: 1
DRESSING/BATHING_DIFFICULTY: NO
ADLS_ACUITY_SCORE: 27

## 2023-03-07 NOTE — PHARMACY-VANCOMYCIN DOSING SERVICE
"Pharmacy Vancomycin Initial Note  Date of Service 2023  Patient's  1940  82 year old, female    Indication: Sepsis    Current estimated CrCl = Estimated Creatinine Clearance: 29.5 mL/min (A) (based on SCr of 1.51 mg/dL (H)).    Creatinine for last 3 days  3/7/2023:  3:59 AM Creatinine 1.51 mg/dL    Recent Vancomycin Level(s) for last 3 days  No results found for requested labs within last 72 hours.      Vancomycin IV Administrations (past 72 hours)      No vancomycin orders with administrations in past 72 hours.                Nephrotoxins and other renal medications (From now, onward)    Start     Dose/Rate Route Frequency Ordered Stop    23 0835  piperacillin-tazobactam (ZOSYN) 3.375 g vial to attach to  mL bag        Note to Pharmacy: For [facility], [facility] and [facility]: For Zosyn-naive patients, use the \"Zosyn initial dose + extended infusion\" order panel.    3.375 g  over 30 Minutes Intravenous ONCE 23 0834            Contrast Orders - past 72 hours (72h ago, onward)    Start     Dose/Rate Route Frequency Stop    23 0515  iopamidol (ISOVUE-370) solution 104 mL         104 mL Intravenous ONCE 23 0529          [employer] Prediction of Planned Initial Vancomycin Regimen  Loading dose: 1500 mg at 09:00 2023.  Regimen: 1000 mg IV every 24 hours.  Start time: 08:54 on 2023  Exposure target: AUC24 (range)400-600 mg/L.hr   AUC24,ss: 581 mg/L.hr  Probability of AUC24 > 400: 86 %  Ctrough,ss: 19.3 mg/L  Probability of Ctrough,ss > 20: 47 %  Probability of nephrotoxicity (Lodise HUDSON ): 16 %          Plan:  1. Start vancomycin loading dose 1500 mg IV once followed by vancomycin 1000 mg IV q24h.   2. Vancomycin monitoring method: AUC  3. Vancomycin therapeutic monitoring goal: 400-600 mg*h/L  4. Pharmacy will check vancomycin levels as appropriate in 1-3 Days.    5. Serum creatinine levels will be ordered daily for the first week of therapy and at least twice weekly for " subsequent weeks.      Raina Lino RPH

## 2023-03-07 NOTE — ED PROVIDER NOTES
ED Provider Note  [facility]      History     Chief Complaint   Patient presents with     Nausea, Vomiting, & Diarrhea     HPI  Betty Tee is a 82 year old female who has a past medical history of atrial fibrillation, hypertension, diabetes, HFpEF presenting with nausea vomiting and diarrhea.  She states that she went to a reputable restaurant and had some meatloaf.  A few hours after she started develop watery vomiting and diarrhea, this started at 4p yesterday. Patient has been weak and unable to care for self. She lives with another 80+yo women who cannot care for the patient.  Denies significant abdominal pain.  Denies dysuria or hematuria.  Denies black or blood in her vomit or stool.  No fevers, chills or body aches.  No recent travel.  No history of C. difficile or recent antibiotic use.  The patient has not had any sick contacts that she knows of.  She was given Zofran by medics.  Denies chest pain, shortness of breath, body aches, headache    Past Medical History  Past Medical History:   Diagnosis Date     Alcohol abuse, in remission      Allergic rhinitis, cause unspecified     allegra helps when she takes it     Antiplatelet or antithrombotic long-term use      Atrial fibrillation (H)     in hosp in 11/11 after surgery w/ fluid overload     Cardiomegaly     LVH on stress echo- cardiac w/u at [facility] ER- neg CT scan for PE, neg stress echo in 8/06     Chest pain, unspecified      Congestive heart failure (H)      Depressive disorder      Diabetes (H)      Disorder of bone and cartilage, unspecified     osteopenia (had been on prempro), improved on 6/06 dexa, stable dexa 11/10     Diverticulosis of colon (without mention of hemorrhage)     last episode yrs ago     Essential hypertension, benign      Follicular bronchiolitis (H)     associated with Sjogrens, dx by chest CT showing mosaic attenuation and air trapping     Gastro-oesophageal reflux disease      ILD (interstitial  lung disease) (H)     associated with Sjogrens, also has mildly elevated IgG4, first noted on chest CT 2015 (mild changes) and also has small airways disease; ILD improved on follow up chest CT 2018.     Insomnia, unspecified     weaned off clonazepam     Irregular heart beat      Lumbago 07/2009    MRI with [facility], now seeing Dr. Cain for sciatic sx's     Major depressive disorder, recurrent episode, moderate (H)      Obstructive sleep apnea     uses cpap     Osteoarthrosis, unspecified whether generalized or localized, unspecified site      Sjogren's syndrome (H)     + RG and SSA and lip bx     Sleep apnea     uses a cpap machine     Tobacco use disorder     chantix in 9/07, started again in 6/08, working     Past Surgical History:   Procedure Laterality Date     APPENDECTOMY       BACK SURGERY  1962     BACK SURGERY  1962     BIOPSY BREAST  09/27/2002    Biopsy Left Breast     BIOPSY BREAST Left 09/27/2002     BREAST SURGERY       CARDIAC SURGERY       CARDIAC SURGERY       CHOLECYSTECTOMY  1990's?     CHOLECYSTECTOMY       COLECTOMY LEFT  11/07/2011    Procedure:COLECTOMY LEFT; Laparoscopic mobilization of splenic flexture, sigmoid colectomy, coloprotoscopy, loop illeostomy; Surgeon:CK CASTANEDA; Location: OR     COLECTOMY LEFT  11/07/2011     COLONOSCOPY  1990,s     COLONOSCOPY N/A 5/3/2022    Procedure: COLONOSCOPY;  Surgeon: Guru Winsome Dias MD;  Location:  GI     CV LEFT ATRIAL APPENDAGE CLOSURE N/A 9/26/2022    Procedure: Left Atrial Appendage Closure;  Surgeon: Uzair Crews MD;  Location:  HEART CARDIAC CATH LAB     ENT SURGERY       EYE SURGERY  2012     FLEXIBLE SIGMOIDOSCOPY  11/03/2011     HYSTERECTOMY TOTAL ABDOMINAL, BILATERAL SALPINGO-OOPHORECTOMY, COMBINED  11/07/2011    Procedure:COMBINED HYSTERECTOMY TOTAL ABDOMINAL, BILATERAL SALPINGO-OOPHORECTOMY; total abdominal hysterectomy, bilateral salpingo-oophorectomy; Surgeon:ALETA MANUEL; Location: OR      HYSTERECTOMY TOTAL ABDOMINAL, BILATERAL SALPINGO-OOPHORECTOMY, COMBINED  11/07/2011     ILEOSTOMY  02/01/2012    takedown loop ileostomy      INSERT STENT URETER  11/07/2011    Procedure:INSERT STENT URETER; Placement of Bilateral Ureteral Stents ; Surgeon:PRANEETH BRYANT; Location:[facility] OR     SIGMOIDECTOMY  02/01/2012    Dr. Siddiqi, Sigmoidectomy for diverticular abscess, iliostomy afterwards until repair     SIGMOIDOSCOPY FLEXIBLE  11/03/2011    Procedure:SIGMOIDOSCOPY FLEXIBLE; Flexible Sigmoidoscopy; Surgeon:CK SIDDIQI; Location:[facility] OR     TAKEDOWN ILEOSTOMY  02/01/2012    Procedure:TAKEDOWN ILEOSTOMY; Takedown Loop Ileostomy ; Surgeon:CK SIDDIQI; Location:[facility] OR     URETERAL STENT PLACEMENT  11/07/2011     [facility] APPENDECTOMY  1970's?     [facility] NONSPECIFIC PROCEDURE  11/2005    exploratory abd lap, adhesions, resolved RLQ pain, diverticulitis episodes     ACCU-CHEK SHANNON PLUS test strip  acetaminophen (TYLENOL) 500 MG tablet  acyclovir (ZOVIRAX) 400 MG tablet  acyclovir (ZOVIRAX) 5 % external ointment  albuterol (PROAIR HFA/PROVENTIL HFA/VENTOLIN HFA) 108 (90 Base) MCG/ACT inhaler  alendronate (FOSAMAX) 70 MG tablet  allopurinol (ZYLOPRIM) 100 MG tablet  anakinra (KINERET) 100 MG/0.67ML SOSY injection  aspirin (ASA) 81 MG EC tablet  atorvastatin (LIPITOR) 10 MG tablet  azithromycin (ZITHROMAX) 250 MG tablet  BD PEN NEEDLE JANE 2ND GEN 32G X 4 MM miscellaneous  blood glucose (NO BRAND SPECIFIED) lancets standard  cevimeline (EVOXAC) 30 MG capsule  Cholecalciferol (VITAMIN D3) 50 MCG (2000 UT) CAPS  clopidogrel (PLAVIX) 75 MG tablet  COMPOUNDED NON-CONTROLLED SUBSTANCE (CMPD RX) - PHARMACY TO MIX COMPOUNDED MEDICATION  cyanocobalamin (VITAMIN B-12) 1000 MCG tablet  escitalopram (LEXAPRO) 20 MG tablet  ferrous gluconate (FERGON) 324 (38 Fe) MG tablet  ferrous sulfate (FEROSUL) 325 (65 Fe) MG tablet  fluticasone (FLONASE) 50 MCG/ACT nasal spray  fluticasone-vilanterol (BREO ELLIPTA) 100-25 MCG/INH  inhaler  hydroxychloroquine (PLAQUENIL) 200 MG tablet  insulin glargine (BASAGLAR KWIKPEN) 100 UNIT/ML pen  insulin lispro (HUMALOG KWIKPEN) 100 UNIT/ML (1 unit dial) KWIKPEN  ketoconazole (NIZORAL) 2 % external cream  KLOR-CON 20 MEQ CR tablet  lidocaine (LIDODERM) 5 % patch  loratadine (CLARITIN) 10 MG tablet  methocarbamol (ROBAXIN) 750 MG tablet  metoprolol succinate ER (TOPROL XL) 50 MG 24 hr tablet  montelukast (SINGULAIR) 10 MG tablet  oxyCODONE-acetaminophen (PERCOCET) 5-325 MG tablet  oxyCODONE-acetaminophen (PERCOCET) 7.5-325 MG per tablet  OZEMPIC, 1 MG/DOSE, 4 MG/3ML pen  pantoprazole (PROTONIX) 40 MG EC tablet  predniSONE (DELTASONE) 5 MG tablet  spironolactone (ALDACTONE) 25 MG tablet  torsemide (DEMADEX) 10 MG tablet  torsemide (DEMADEX) 20 MG tablet  traZODone (DESYREL) 50 MG tablet      Allergies   Allergen Reactions     Amoxicillin-Pot Clavulanate      Augmentin Nausea and Vomiting     Codeine Nausea and Vomiting     PN: LW Reaction: HIVES     Penicillins Nausea and Vomiting     PN: LW Reaction: GI Upset     Phenobarbital Itching     Seasonal Allergies      Family History  Family History   Problem Relation Age of Onset     C.A.D. Mother 63        MI- first at age 63     Heart Disease Mother      Hypertension Mother      Cerebrovascular Disease Mother      Hyperlipidemia Mother      Coronary Artery Disease Mother         MI-first at age 63     Other - See Comments Mother         cerebrovascular disease      Alcohol/Drug Father      Alzheimer Disease Father      Dementia Father      Hypertension Father      Hyperlipidemia Father      Substance Abuse Father      Diabetes Sister      Hypertension Sister      Hyperlipidemia Sister      Substance Abuse Sister      Asthma Sister      C.A.D. Sister 52        Minor MI- age 50's     Heart Disease Sister      Hypertension Sister      Hypertension Sister      Cancer - colorectal Sister 48        Late 40's early 50's     Gastrointestinal Disease Sister          "Diverticulitis     Lipids Sister      Lipids Sister      Diabetes Sister      Heart Disease Sister         CHF     Cancer Sister         lung, smoker     Substance Abuse Sister      Asthma Sister      Cancer Sister      Coronary Artery Disease Sister         minor MI-age 50's     Heart Disease Sister      Hypertension Sister      Hypertension Sister      Colon Cancer Sister      Diverticulitis Sister      Lung Cancer Sister      Heart Disease Sister         CHF     Diabetes Sister      Asthma Sister      Hypertension Brother      Prostate Cancer Brother 74        Dx'd age 74     Gastrointestinal Disease Brother         Diverticulitis     Parkinsonism Brother      Substance Abuse Brother      Prostate Cancer Brother      Hyperlipidemia Brother      Hypertension Brother      Prostate Cancer Brother      Diverticulitis Brother      Parkinsonism Brother      Breast Cancer Daughter      Breast Cancer Daughter      Diabetes Other      Hypertension Other      Anesthesia Reaction No family hx of      Deep Vein Thrombosis (DVT) No family hx of      Social History   Social History     Tobacco Use     Smoking status: Former     Packs/day: 0.50     Years: 10.00     Pack years: 5.00     Types: Cigarettes     Quit date: 2011     Years since quittin.6     Smokeless tobacco: Never     Tobacco comments:      ppd   Substance Use Topics     Alcohol use: No     Alcohol/week: 0.0 standard drinks     Comment: In recovery beginning      Drug use: No      Past medical history, past surgical history, medications, allergies, family history, and social history were reviewed with the patient. No additional pertinent items.      A medically appropriate review of systems was performed with pertinent positives and negatives noted in the HPI, and all other systems negative.    Physical Exam   BP: (!) 144/82  Pulse: 80  Temp: 98.6  F (37  C)  Resp: 21  Height: 165.1 cm (5' 5\")  Weight: 77.1 kg (170 lb)  Physical Exam  Physical " Exam   Constitutional: oriented to person, place, and time. appears well-developed and well-nourished.   HENT:   Head: Normocephalic and atraumatic.   Neck: Normal range of motion.   Pulmonary/Chest: Effort normal. No respiratory distress.   Cardiac: No murmurs, rubs, gallops. RRR.  Abdominal: Abdomen soft, mild global tenderness described as discomfort, nondistended. No rebound tenderness.  MSK: Long bones without deformity or evidence of trauma  Neurological: alert and oriented to person, place, and time.   Skin: Skin is warm and dry.   Psychiatric:  normal mood and affect.  behavior is normal. Thought content normal.       ED Course, Procedures, & Data      Procedures            No results found for any visits on 03/07/23.  Medications   0.9% sodium chloride BOLUS (has no administration in time range)     Followed by   sodium chloride 0.9% infusion (has no administration in time range)   ondansetron (ZOFRAN) injection 4 mg (has no administration in time range)     Labs Ordered and Resulted from Time of ED Arrival to Time of ED Departure - No data to display  No orders to display          Critical care was performed.   Critical Care Addendum  My initial assessment, based on my review of nursing observations, review of vital signs, focused history and physical exam, established a high suspicion that Betty Tee has elevated lactic acid, severe dehydration, which requires immediate intervention, and therefore she is critically ill.     After the initial assessment, the care team initiated IV fluid administration to provide stabilization care. Due to the critical nature of this patient, I reassessed nursing observations, vital signs and physical exam multiple times prior to her disposition.     Time also spent performing documentation, reviewing test results and discussion with consultants.     Critical care time (excluding teaching time and procedures): 30 minutes.     Assessment & Plan    MDM  Patient here  with nausea/vomting/diarrhea likely related to gastroenteritis. She is globally weak and unable to care for self at home, has minimal help at home. Elevated lactic acid likely related to fluid loss, repeat pending. She has elevated blood sugar, will check gas and vbg to assess for dka. Patient is stable but appears unwell and overall quite weak. Will plan to keep for hydration, lactic acid trending and blood sugar control.    I have reviewed the nursing notes. I have reviewed the findings, diagnosis, plan and need for follow up with the patient.    New Prescriptions    No medications on file       Final diagnoses:   Gastroenteritis       Chidi Almeida  [facility] EMERGENCY DEPARTMENT  3/7/2023     Chidi Almeida MD  03/07/23 0624

## 2023-03-07 NOTE — H&P
[facility]    History and Physical - Hospitalist Service, GOLD TEAM 12       Date of Admission:  3/7/2023    Addendum  March 7, 2023  4:09 PM     Severe hip OA pain unresponsive to home percocet etc. Ordered IV morphine 2mg PRN q6hr to help her through the night / ED bed discomfort, as this medication had helped her pain previously      Assessment & Plan      Betty Tee is a 82 year old female admitted on 3/7/2023. She has a history of of afib s/p Watchman, Sjogrens on 5mg prednisone daily and HCQ, severe hip OA pending SRI in 5/2023, presenting w/ severe acute n/v/d and dehydration.     #Severe sepsis from acute gastroenteritis (vomiting, diarrhea) with max lactate of 4.1, present on admission   #Lactic acidosis  - foodborne toxin (meatloaf day prior to admission w/ symptoms w/in hours) vs. Viral / norvirus  - enteric / c. Diff pending  - MIVF  - c/w vancomycin/zosyn (3/7 - ) started in ED given high procal 13 while Blood cultures pending. UA w/ 8 WBC and no urinary symptoms.   - lyte check and repletion    #YOANNA on CKD III, baseline creatinine ~1.0, 1.5 on presentation  - suspect prerenal, MIVF  - allopurinol: dose-reduce 100 --> 50 per GFR 3/8, can resume 100 daily on discharge    #HTN  - hold home metoprolol (bradycarida), spironolactone, torsemide given hypovolemia    #Sjogrens / PMR / history of gout  - continue with home prednisone 5mg daily and HCQ 400mg daily and allopurinol     # Follicular bronchiolitis, prior mild ILD, 3L NC in ED  # Allergic rhinitis  - atelectasis on CXR, IC + ambulation when able  - continue with home Singulair, loratadine  - continue with home LABA/ICS  - albuterol q6hr , space as able    #Afib s/p Watchman 11/2023  #CAD  - c/w dapt through ~5/2023, then asa indefinitely  - continue with home atorvastatin 5mg daily  - hold home     #DMII  - last A1c 5.5 in 11/28/2022  - home: 24 U basaglar qAM, 8 novolog w/ meals TID  - here:  "sliding only pending trends given poor PO    #CASEY  - hold home PO iron while n/v/d/    #GERD  - continue with home PPI    #Mood / chronic pain / severe hip OA  - continue with home lexapro  - continue with home percocet PRN, robaxin       Diet: Advance Diet as Tolerated: Clear Liquid Diet    DVT Prophylaxis: Anti-embolisim stockings (TEDs) (anticoagulation contraindicated give GAVE and bleeding history)  Kilpatrick Catheter: Not present  Lines: None     Cardiac Monitoring: None  Code Status: Full Code      Clinically Significant Risk Factors Present on Admission             # Anion Gap Metabolic Acidosis: Highest Anion Gap = 20 mmol/L in last 2 days, will monitor and treat as appropriate    # Drug Induced Platelet Defect: home medication list includes an antiplatelet medication        # Overweight: Estimated body mass index is 28.29 kg/m  as calculated from the following:    Height as of this encounter: 1.651 m (5' 5\").    Weight as of this encounter: 77.1 kg (170 lb).           Disposition Plan      Expected Discharge Date: 03/09/2023                  Kevin Pressley MD  Hospitalist Service, [address]  Securely message with [email or web address] (more info)  Text page via [facility] Paging/Directory   See signed in provider for up to date coverage information    ______________________________________________________________________    Chief Complaint   Vomiting and diarrhea    History is obtained from the patient    History of Present Illness   Betty Tee is a 82 year old female who presented to the ED the night after eating meatloaf in a roadside [employer]ant. Several hours later had acute onset of severe nausea, vomiting, and diarrhea back at home. Non bloody. No acute abdominal pain, no chest pain. Shortness of breath mildly w/ cough at baseline unchanged. No fevers or prodromal symptoms; no dysuria or new incontinence. No medication changes or lapses. No sick contacts. " Describes chronic severe hip pain and hopes for her SRI as soon as possible as this is extremely debilitating. Home percocet really doesn't do much for her. Has tried most other things for pain including sees a pain specialist and nothing works.       Past Medical History    Past Medical History:   Diagnosis Date     Alcohol abuse, in remission      Allergic rhinitis, cause unspecified     allegra helps when she takes it     Antiplatelet or antithrombotic long-term use      Atrial fibrillation (H)     in hosp in 11/11 after surgery w/ fluid overload     Cardiomegaly     LVH on stress echo- cardiac w/u at [facility] ER- neg CT scan for PE, neg stress echo in 8/06     Chest pain, unspecified      Congestive heart failure (H)      Depressive disorder      Diabetes (H)      Disorder of bone and cartilage, unspecified     osteopenia (had been on prempro), improved on 6/06 dexa, stable dexa 11/10     Diverticulosis of colon (without mention of hemorrhage)     last episode yrs ago     Essential hypertension, benign      Follicular bronchiolitis (H)     associated with Sjogrens, dx by chest CT showing mosaic attenuation and air trapping     Gastro-oesophageal reflux disease      ILD (interstitial lung disease) (H)     associated with Sjogrens, also has mildly elevated IgG4, first noted on chest CT 2015 (mild changes) and also has small airways disease; ILD improved on follow up chest CT 2018.     Insomnia, unspecified     weaned off clonazepam     Irregular heart beat      Lumbago 07/2009    MRI with NEAL, now seeing Dr. Cain for sciatic sx's     Major depressive disorder, recurrent episode, moderate (H)      Obstructive sleep apnea     uses cpap     Osteoarthrosis, unspecified whether generalized or localized, unspecified site      Sjogren's syndrome (H)     + RG and SSA and lip bx     Sleep apnea     uses a cpap machine     Tobacco use disorder     chantix in 9/07, started again in 6/08, working       Past Surgical History    Past Surgical History:   Procedure Laterality Date     APPENDECTOMY       BACK SURGERY  1962     BACK SURGERY  1962     BIOPSY BREAST  09/27/2002    Biopsy Left Breast     BIOPSY BREAST Left 09/27/2002     BREAST SURGERY       CARDIAC SURGERY       CARDIAC SURGERY       CHOLECYSTECTOMY  1990's?     CHOLECYSTECTOMY       COLECTOMY LEFT  11/07/2011    Procedure:COLECTOMY LEFT; Laparoscopic mobilization of splenic flexture, sigmoid colectomy, coloprotoscopy, loop illeostomy; Surgeon:CK SIDDIQI; Location:[facility] OR     COLECTOMY LEFT  11/07/2011     COLONOSCOPY  1990,s     COLONOSCOPY N/A 5/3/2022    Procedure: COLONOSCOPY;  Surgeon: Guru Winsome Dias MD;  Location: [facility] GI     CV LEFT ATRIAL APPENDAGE CLOSURE N/A 9/26/2022    Procedure: Left Atrial Appendage Closure;  Surgeon: Uzair Crews MD;  Location:  HEART CARDIAC CATH LAB     ENT SURGERY       EYE SURGERY  2012     FLEXIBLE SIGMOIDOSCOPY  11/03/2011     HYSTERECTOMY TOTAL ABDOMINAL, BILATERAL SALPINGO-OOPHORECTOMY, COMBINED  11/07/2011    Procedure:COMBINED HYSTERECTOMY TOTAL ABDOMINAL, BILATERAL SALPINGO-OOPHORECTOMY; total abdominal hysterectomy, bilateral salpingo-oophorectomy; Surgeon:ALETA MANUEL; Location:[facility] OR     HYSTERECTOMY TOTAL ABDOMINAL, BILATERAL SALPINGO-OOPHORECTOMY, COMBINED  11/07/2011     ILEOSTOMY  02/01/2012    takedown loop ileostomy      INSERT STENT URETER  11/07/2011    Procedure:INSERT STENT URETER; Placement of Bilateral Ureteral Stents ; Surgeon:PRANEETH BRYANT; Location:[facility] OR     SIGMOIDECTOMY  02/01/2012    Dr. Siddiqi, Sigmoidectomy for diverticular abscess, iliostomy afterwards until repair     SIGMOIDOSCOPY FLEXIBLE  11/03/2011    Procedure:SIGMOIDOSCOPY FLEXIBLE; Flexible Sigmoidoscopy; Surgeon:CK SIDDIQI; Location:[facility] OR     TAKEDOWN ILEOSTOMY  02/01/2012    Procedure:TAKEDOWN ILEOSTOMY; Takedown Loop Ileostomy ; Surgeon:CK SIDDIQI; Location:[facility] OR     URETERAL STENT PLACEMENT   11/07/2011     [facility] APPENDECTOMY  1970's?     [facility] NONSPECIFIC PROCEDURE  11/2005    exploratory abd lap, adhesions, resolved RLQ pain, diverticulitis episodes       Prior to Admission Medications   Prior to Admission Medications   Prescriptions Last Dose Informant Patient Reported? Taking?   ACCU-CHEK SHANNON PLUS test strip  Friend No No   Sig: USE TO TEST BLOOD SUGARS 3 TIMES DAILY   BD PEN NEEDLE JANE 2ND GEN 32G X 4 MM miscellaneous  Friend No No   Sig: USE TO TEST 4 TIMES A DAY   COMPOUNDED NON-CONTROLLED SUBSTANCE (CMPD RX) - PHARMACY TO MIX COMPOUNDED MEDICATION  Friend No No   Sig: Estriol 1 mg/g Apply small amount to finger and apply to inside vagina daily for 2 weeks then twice weekly Route: vaginally Dispense 30 grams 11 refills   Cholecalciferol (VITAMIN D3) 50 MCG (2000 UT) CAPS 3/6/2023 at morning Friend No Yes   Sig: TAKE 100 MCG BY MOUTH DAILY (TAKE 2 TABLET (50 MCG) BY MOUTH DAILY - ORAL)   KLOR-CON 20 MEQ CR tablet 3/6/2023 at 0800 Friend No Yes   Sig: TAKE 2 TABLETS (40 MEQ) BY MOUTH EVERY MORNING AND 1 TABLET (20 MEQ) EVERY EVENING.   OZEMPIC, 1 MG/DOSE, 4 MG/3ML pen 3/6/2023 Friend No Yes   Sig: INJECT 1 MG SUBCUTANEOUS ONCE A WEEK   acetaminophen (TYLENOL) 500 MG tablet Past Week Friend Yes Yes   Sig: Take 1,000 mg by mouth every 8 hours as needed (max 6 tablets/24 hours, 2 tablets/dose)   acyclovir (ZOVIRAX) 400 MG tablet 3/6/2023 at morning Friend Yes Yes   Sig: Take 1 tablet (400 mg) by mouth 3 times daily as needed For a couple days   acyclovir (ZOVIRAX) 5 % external ointment Past Week Friend Yes Yes   Sig: Apply topically as needed (6 times day PRN for outbreaks) As needed for outbreaks   albuterol (PROAIR HFA/PROVENTIL HFA/VENTOLIN HFA) 108 (90 Base) MCG/ACT inhaler Unknown Friend No Yes   Sig: Inhale 2 puffs into the lungs every 6 hours   alendronate (FOSAMAX) 70 MG tablet 3/6/2023 at morning Friend No Yes   Sig: Take 1 tablet (70 mg) by mouth every 7 days   allopurinol (ZYLOPRIM) 100 MG  tablet Past Week at dinner Friend No Yes   Sig: Take 1 tablet (100 mg) by mouth daily   anakinra (KINERET) 100 MG/0.67ML SOSY injection Past Week Friend No Yes   Si mg every day x 3 days as needed for flare ups   aspirin (ASA) 81 MG EC tablet 3/6/2023 at morning Friend No Yes   Sig: Take 1 tablet (81 mg) by mouth daily   atorvastatin (LIPITOR) 10 MG tablet 3/6/2023 at 1500 Friend No Yes   Sig: TAKE 1/2 TABLET BY MOUTH EVERY DAY   azithromycin (ZITHROMAX) 250 MG tablet 3/6/2023 at 0800 Friend No Yes   Sig: TAKE 1 TABLET BY MOUTH EVERY DAY   blood glucose (NO BRAND SPECIFIED) lancets standard  Friend No No   Sig: Use to test blood sugar 3 times daily or as directed   cevimeline (EVOXAC) 30 MG capsule 3/6/2023 at morning Friend No Yes   Sig: Take 1 capsule (30 mg) by mouth 3 times daily   clopidogrel (PLAVIX) 75 MG tablet Past Week at dinner Friend No Yes   Sig: Take 1 tablet (75 mg) by mouth daily   cyanocobalamin (VITAMIN B-12) 1000 MCG tablet 3/6/2023 at morning Friend No Yes   Sig: Take 1 tablet (1,000 mcg) by mouth daily   escitalopram (LEXAPRO) 20 MG tablet 3/6/2023 at 0800 Friend No Yes   Sig: TAKE 1 TABLET BY MOUTH EVERY DAY   ferrous gluconate (FERGON) 324 (38 Fe) MG tablet 3/6/2023 at 0800 Friend No Yes   Sig: Take 1 tablet (324 mg) by mouth daily (with breakfast)   ferrous sulfate (FEROSUL) 325 (65 Fe) MG tablet  Friend No No   Sig: TAKE 1 TABLET BY MOUTH EVERY DAY WITH BREAKFAST   fluticasone (FLONASE) 50 MCG/ACT nasal spray Unknown Friend No Yes   Sig: SPRAY 1-2 SPRAYS INTO BOTH NOSTRILS DAILY AS NEEDED FOR ALLERGIES   fluticasone-vilanterol (BREO ELLIPTA) 100-25 MCG/INH inhaler Past Week at morning Friend No Yes   Sig: Inhale 1 puff into the lungs daily   hydroxychloroquine (PLAQUENIL) 200 MG tablet Past Week at night Friend No Yes   Sig: Take 2 tablets (400 mg) by mouth daily Get annual eye exams for hydroxychloroquine (Plaquenil) monitoring and fax to [phone]   insulin glargine (BASAGLAR  KWIKPEN) 100 UNIT/ML pen Past Week Friend No Yes   Sig: 3INJECT 24 UNITS SUBCUTANEOUS DAILY (TO REPLACE LANTUS)   Patient taking differently: INJECT 24 UNITS SUBCUTANEOUS DAILY (TO REPLACE LANTUS)   insulin lispro (HUMALOG KWIKPEN) 100 UNIT/ML (1 unit dial) KWIKPEN Past Week Friend No Yes   Sig: Inject 12 Units Subcutaneous 3 times daily (before meals) (12 units plus 2 unit prime x 3 doses = 42 units/day)   Patient taking differently: Inject 8 Units Subcutaneous 3 times daily (before meals)   ketoconazole (NIZORAL) 2 % external cream Unknown Friend No Yes   Sig: Apply topically 2 times daily as needed for itching   lidocaine (LIDODERM) 5 % patch Unknown Friend No Yes   Sig: APPLY PATCH TO PAINFUL AREA FOR UP TO 12 H WITHIN A 24 H PERIOD. REMOVE AFTER 12 HOURS.   Patient taking differently: Apply patch to painful area for up to 12 h within a 24 h period.  Remove after 12 hours.   loratadine (CLARITIN) 10 MG tablet 3/6/2023 at 0800 Friend No Yes   Sig: TAKE 1 TABLET BY MOUTH EVERY DAY   methocarbamol (ROBAXIN) 750 MG tablet Past Week Friend No Yes   Sig: Take 1 tablet (750 mg) by mouth 3 times daily as needed for muscle spasms   metoprolol succinate ER (TOPROL XL) 50 MG 24 hr tablet 3/6/2023 at 0800 Friend No Yes   Sig: Take 1 tablet (50 mg) by mouth 2 times daily   montelukast (SINGULAIR) 10 MG tablet Past Week at bedtime Friend No Yes   Sig: TAKE 1 TABLET BY MOUTH EVERYDAY AT BEDTIME   oxyCODONE-acetaminophen (PERCOCET) 5-325 MG tablet Past Week Friend No Yes   Sig: Take 1 tablet by mouth 2 times daily as needed for pain May fill and start on 9/21/2022. For chronic pain.   oxyCODONE-acetaminophen (PERCOCET) 7.5-325 MG per tablet  Friend No No   Sig: Take 1 tablet by mouth 2 times daily as needed for severe pain (7-10) With at least 4 hours between doses. Fill and start 2/17/23   pantoprazole (PROTONIX) 40 MG EC tablet 3/6/2023 at morning Friend No Yes   Sig: Take 1 tablet (40 mg) by mouth daily (replaces famotidine-  should stop taking famotidine)   predniSONE (DELTASONE) 5 MG tablet 3/6/2023 at 0800 Friend No Yes   Sig: Take 1 tablet (5 mg) by mouth daily   spironolactone (ALDACTONE) 25 MG tablet 3/6/2023 at 0800 Friend No Yes   Sig: Take 2 tablets (50 mg) by mouth daily   torsemide (DEMADEX) 10 MG tablet Past Week at afternoon Friend No Yes   Sig: TAKE ONE TAB (10 MG) WITH ONE 20 MG TAB TO = 30 MG DAILY IN AFTERNOON.   torsemide (DEMADEX) 20 MG tablet 3/6/2023 at morning Friend No Yes   Sig: TAKE 2 TABLETS (40 MG) BY MOUTH EVERY MORNING AND 1 TABLET (20 MG) DAILY AT 2 PM. TAKE THE AFTERNOON DOSE WITH AN EXTRA 10 MG TAB FOR A TOTAL OF 30 MG IN THE AFTERNOON   traZODone (DESYREL) 50 MG tablet Past Week at bedtime Friend No Yes   Sig: TAKE 3 TABLETS (150 MG) BY MOUTH NIGHTLY AS NEEDED FOR SLEEP      Facility-Administered Medications: None           Physical Exam   Vital Signs: Temp: 98.6  F (37  C) Temp src: Oral BP: 112/72 Pulse: 58   Resp: 18 SpO2: 96 % O2 Device: Nasal cannula Oxygen Delivery: 2 LPM  Weight: 170 lbs 0 oz    Physical Exam   Constitutional:  NAD  HEENT: EOMI, 3L NC  Neck: Symmetric  Cardiovascular: regular bradycardia without murmurs or gallops  Pulmonary/Chest: CTAB. No respiratory distress.  GI: Soft, non-tender , non-distended    Musculoskeletal:  No lower extremity edema   Skin: Skin is warm and dry  Neurological: Alert and oriented   Psychiatric:  euthymic        Medical Decision Making       75 MINUTES SPENT BY ME on the date of service doing chart review, history, exam, documentation & further activities per the note.      Data     I have personally reviewed the following data over the past 24 hrs:    8.6  \   16.0 (H)   / 220     140 101 25.8 (H) /  144 (H)   3.5 19 (L) 1.51 (H) \       ALT: 11 AST: 21 AP: 80 TBILI: 0.7   ALB: 4.1 TOT PROTEIN: 7.3 LIPASE: 21       Procal: 13.10 (HH) CRP: 42.70 (H) Lactic Acid: 2.9 (H)         Imaging results reviewed over the past 24 hrs:   Recent Results (from the past  24 hour(s))   CT Abdomen Pelvis w Contrast    Narrative    EXAM: CT ABDOMEN PELVIS W CONTRAST  LOCATION: [facility]  DATE/TIME: 3/7/2023 5:36 AM    INDICATION: abd pain, vomiting  COMPARISON: None.  TECHNIQUE: CT scan of the abdomen and pelvis was performed following injection of IV contrast. Multiplanar reformats were obtained. Dose reduction techniques were used.  CONTRAST: iopamidol (ISOVUE 370) solution 104 mL    FINDINGS:   LOWER CHEST: Dependent atelectatic changes are seen in the lung bases bilaterally.    HEPATOBILIARY: Diffuse hepatic steatosis. No significant mass. No bile duct dilatation. No calcified gallstones. Gallbladder is surgically absent.    PANCREAS: Normal.    SPLEEN: Normal.    ADRENAL GLANDS: Normal.    KIDNEYS/BLADDER: Nonobstructive collecting system calculi are seen in the kidneys bilaterally, largest in the right measures 9 mm in the lower pole, the largest on the left measures 9.2 mm in the left lower pole. There is no evidence of hydronephrosis or   hydroureter. The bladder is unremarkable.    BOWEL: Diverticulosis of the colon. No acute inflammatory change. No obstruction.     LYMPH NODES: No lymphadenopathy.    VASCULATURE: No abdominal aortic aneurysm. Atherosclerotic calcifications are seen throughout the abdominal aorta and iliac vessels. A peripherally calcified 6 mm saccular aneurysm is seen in the splenic artery at the splenic hilum. (Image 49, series 3)    PELVIC ORGANS: No pelvic masses.    MUSCULOSKELETAL: There are degenerative osteoarthritic changes of the right hip joint are noted with bone-on-bone articulation, subchondral cystic changes, and subchondral sclerosis. No worrisome osseous lesions are identified.      Impression    IMPRESSION:   1.  Bilateral nonobstructive collecting system calculi, the largest of which measure 9 mm in the right lower pole and 9.2 mm in the left lower pole.   2.  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  3.  Hepatic steatosis  4.  Sigmoid colonic diverticulosis without evidence of diverticulitis  5.  Prior cholecystectomy  6.  Severe degenerative osteoarthritic changes of the right hip   XR Chest 2 Views    Narrative    Chest 2 views    INDICATIONS: Chest pain    COMPARISON: Coronary CT angiogram 8/5/2022 and chest plain films  4/22/2022    FINDINGS: Heart size normal. Linear subsegmental atelectasis in the  left lower lung field. Bones are osteopenic.      Impression    IMPRESSION: Left lower lung subsegmental atelectasis. Osteopenia.    ROCIO DE LOS SANTOS MD         SYSTEM ID:  [id number]

## 2023-03-07 NOTE — LETTER
[facility] [address] [address]  [phone]    FACSIMILE TRANSMITTAL SHEET    TO: Francine Wells   COMPANY: [facility]  FAX NUMBER: [phone]  PHONE NUMBER: [phone]     FROM: Gayla Yi, MSW, LGSW  ED/OBS   [facility]  PHONE: [phone]  FAX: [phone]  DATE: 3/8/2023       _____URGENT _____REVIEW ONLY _____PLEASE COMMENT____PLEASE REPLY    NOTES/COMMENTS: On the advice of Sr Nghia Carlson who spoke with Trixie Mejias, I am sending this referral to you, as well as to the Admissions office. Please consider this patient for admission to your facility.    ThanksGayla                                        IF YOU DID NOT RECEIVE THE CORRECT NUMBER OF PAGES OR THE FAX DID NOT COME THROUGH CLEARLY, PLEASE CALL THE SENDER     CONFIDENTIALITY STATEMENT: Confidential information that may accompany this transmission contains protected health information under state and federal law and is legally privileged. This information is intended only for the use of the individual or entity named above and may be used only for carrying out treatment, payment or other healthcare operations. The recipient or person responsible for delivering this information is prohibited by law from disclosing this information without proper authorization to any other party, unless required to do so by law or regulation. If you are not the intended recipient, you are hereby notified that any review, dissemination, distribution, or copying of this message is strictly prohibited. If you have received this communication in error, please destroy the materials and contact us immediately by calling the number listed above. No response indicates that the information was received by the appropriate authorized party

## 2023-03-07 NOTE — ED NOTES
Pt signed out to me by Dr. James Almeida at 7 am.       Situation: Patient is an 82-year-old female who presents to the ER due to nausea vomiting and diarrhea that started yesterday after eating some meat loaf.  Symptoms are most consistent with likely gastroenteritis.  Patient has CT abdomen pelvis which is stable.  Patient's labs do however show an elevated lactic acid.  Plan is to give her a liter of IV fluids and repeat her lactic acid.    Plan: Plan is to repeat a lactic acid and if improving to admit to ED observation unit.    Shift Report:    Patient had a repeat lactic acid which is elevated from baseline.  Patient's continue to have diarrhea in the ER.  No further vomiting.  Did repeat abdominal exam that still remains soft.  Patient's UA is still pending.  I added on stool cultures.  Plan is admit the patient to internal medicine for further care.  Case was discussed with the hospitalist who excepted the patient for admission.      Results for orders placed or performed during the hospital encounter of 03/07/23   CT Abdomen Pelvis w Contrast     Status: None    Narrative    EXAM: CT ABDOMEN PELVIS W CONTRAST  LOCATION: [facility]  DATE/TIME: 3/7/2023 5:36 AM    INDICATION: abd pain, vomiting  COMPARISON: None.  TECHNIQUE: CT scan of the abdomen and pelvis was performed following injection of IV contrast. Multiplanar reformats were obtained. Dose reduction techniques were used.  CONTRAST: iopamidol (ISOVUE 370) solution 104 mL    FINDINGS:   LOWER CHEST: Dependent atelectatic changes are seen in the lung bases bilaterally.    HEPATOBILIARY: Diffuse hepatic steatosis. No significant mass. No bile duct dilatation. No calcified gallstones. Gallbladder is surgically absent.    PANCREAS: Normal.    SPLEEN: Normal.    ADRENAL GLANDS: Normal.    KIDNEYS/BLADDER: Nonobstructive collecting system calculi are seen in the kidneys bilaterally, largest in the right measures 9  mm in the lower pole, the largest on the left measures 9.2 mm in the left lower pole. There is no evidence of hydronephrosis or   hydroureter. The bladder is unremarkable.    BOWEL: Diverticulosis of the colon. No acute inflammatory change. No obstruction.     LYMPH NODES: No lymphadenopathy.    VASCULATURE: No abdominal aortic aneurysm. Atherosclerotic calcifications are seen throughout the abdominal aorta and iliac vessels. A peripherally calcified 6 mm saccular aneurysm is seen in the splenic artery at the splenic hilum. (Image 49, series 3)    PELVIC ORGANS: No pelvic masses.    MUSCULOSKELETAL: There are degenerative osteoarthritic changes of the right hip joint are noted with bone-on-bone articulation, subchondral cystic changes, and subchondral sclerosis. No worrisome osseous lesions are identified.      Impression    IMPRESSION:   1.  Bilateral nonobstructive collecting system calculi, the largest of which measure 9 mm in the right lower pole and 9.2 mm in the left lower pole.   2.  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  3.  Hepatic steatosis  4.  Sigmoid colonic diverticulosis without evidence of diverticulitis  5.  Prior cholecystectomy  6.  Severe degenerative osteoarthritic changes of the right hip   XR Chest 2 Views     Status: None    Narrative    Chest 2 views    INDICATIONS: Chest pain    COMPARISON: Coronary CT angiogram 8/5/2022 and chest plain films  4/22/2022    FINDINGS: Heart size normal. Linear subsegmental atelectasis in the  left lower lung field. Bones are osteopenic.      Impression    IMPRESSION: Left lower lung subsegmental atelectasis. Osteopenia.    ROCIO DE LOS SANTOS MD         SYSTEM ID:  [id number]   Comprehensive metabolic panel     Status: Abnormal   Result Value Ref Range    Sodium 140 136 - 145 mmol/L    Potassium 3.5 3.4 - 5.3 mmol/L    Chloride 101 98 - 107 mmol/L    Carbon Dioxide (CO2) 19 (L) 22 - 29 mmol/L    Anion Gap 20 (H) 7 -  15 mmol/L    Urea Nitrogen 25.8 (H) 8.0 - 23.0 mg/dL    Creatinine 1.51 (H) 0.51 - 0.95 mg/dL    Calcium 9.0 8.8 - 10.2 mg/dL    Glucose 300 (H) 70 - 99 mg/dL    Alkaline Phosphatase 80 35 - 104 U/L    AST 21 10 - 35 U/L    ALT 11 10 - 35 U/L    Protein Total 7.3 6.4 - 8.3 g/dL    Albumin 4.1 3.5 - 5.2 g/dL    Bilirubin Total 0.7 <=1.2 mg/dL    GFR Estimate 34 (L) >60 mL/min/1.73m2   Lipase     Status: Normal   Result Value Ref Range    Lipase 21 13 - 60 U/L   Lactic acid whole blood     Status: Abnormal   Result Value Ref Range    Lactic Acid 3.9 (H) 0.7 - 2.0 mmol/L   UA with Microscopic reflex to Culture     Status: Abnormal    Specimen: Urine, Catheter   Result Value Ref Range    Color Urine Light Yellow Colorless, Straw, Light Yellow, Yellow    Appearance Urine Clear Clear    Glucose Urine 50 (A) Negative mg/dL    Bilirubin Urine Negative Negative    Ketones Urine Negative Negative mg/dL    Specific Gravity Urine 1.020 1.003 - 1.035    Blood Urine Small (A) Negative    pH Urine 5.5 5.0 - 7.0    Protein Albumin Urine 30 (A) Negative mg/dL    Urobilinogen Urine Normal Normal, 2.0 mg/dL    Nitrite Urine Negative Negative    Leukocyte Esterase Urine Negative Negative    RBC Urine 5 (H) <=2 /HPF    WBC Urine 8 (H) <=5 /HPF    Squamous Epithelials Urine <1 <=1 /HPF    Transitional Epithelials Urine <1 <=1 /HPF    Narrative    Urine Culture not indicated   CBC with platelets and differential     Status: Abnormal   Result Value Ref Range    WBC Count 8.6 4.0 - 11.0 10e3/uL    RBC Count 5.59 (H) 3.80 - 5.20 10e6/uL    Hemoglobin 16.0 (H) 11.7 - 15.7 g/dL    Hematocrit 49.7 (H) 35.0 - 47.0 %    MCV 89 78 - 100 fL    MCH 28.6 26.5 - 33.0 pg    MCHC 32.2 31.5 - 36.5 g/dL    RDW 15.7 (H) 10.0 - 15.0 %    Platelet Count 220 150 - 450 10e3/uL    % Neutrophils 87 %    % Lymphocytes 1 %    % Monocytes 10 %    % Eosinophils 1 %    % Basophils 0 %    % Immature Granulocytes 1 %    NRBCs per 100 WBC 0 <1 /100    Absolute  Neutrophils 7.6 1.6 - 8.3 10e3/uL    Absolute Lymphocytes 0.1 (L) 0.8 - 5.3 10e3/uL    Absolute Monocytes 0.8 0.0 - 1.3 10e3/uL    Absolute Eosinophils 0.1 0.0 - 0.7 10e3/uL    Absolute Basophils 0.0 0.0 - 0.2 10e3/uL    Absolute Immature Granulocytes 0.0 <=0.4 10e3/uL    Absolute NRBCs 0.0 10e3/uL   Asymptomatic COVID-19 Virus (Coronavirus) by PCR Nasopharyngeal     Status: Normal    Specimen: Nasopharyngeal; Swab   Result Value Ref Range    SARS CoV2 PCR Negative Negative    Narrative    Testing was performed using the [employer] Xpress SARS-CoV-2 Assay on the Cepheid Gene-Xpert Instrument Systems. Additional information about this Emergency Use Authorization (EUA) assay can be found via the Lab Guide. This test should be ordered for the detection of SARS-CoV-2 in individuals who meet SARS-CoV-2 clinical and/or epidemiological criteria as well as from individuals without symptoms or other reasons to suspect COVID-19. Test performance for asymptomatic patients has only been established in anterior nasal swab specimens. This test is for in vitro diagnostic use under the FDA EUA for laboratories certified under CLIA to perform high complexity testing. This test has not been FDA cleared or approved. A negative result does not rule out the presence of PCR inhibitors in the specimen or target RNA concentration below the limit of detection for the assay. The possibility of a false negative should be considered if the patient's recent exposure or clinical presentation suggests COVID-19. This test was validated by [facility] [employer]. These Laboratories are certified under the Clinical Laboratory Improvement Amendments (CLIA) as qualified to perform high complexity testing.     Lactic acid whole blood     Status: Abnormal   Result Value Ref Range    Lactic Acid 3.8 (H) 0.7 - 2.0 mmol/L   Ketone Beta-Hydroxybutyrate Quantitative     Status: Abnormal   Result Value Ref Range    Ketone (Beta-Hydroxybutyrate)  Quantitative 0.40 (H) <=0.30 mmol/L   Blood gas venous     Status: Abnormal   Result Value Ref Range    pH Venous 7.29 (L) 7.32 - 7.43    pCO2 Venous 52 (H) 40 - 50 mm Hg    pO2 Venous 24 (L) 25 - 47 mm Hg    Bicarbonate Venous 25 21 - 28 mmol/L    Base Excess/Deficit (+/-) -2.0 -7.7 - 1.9 mmol/L    FIO2 0    Glucose by meter     Status: Abnormal   Result Value Ref Range    GLUCOSE BY METER POCT 228 (H) 70 - 99 mg/dL   Lactic acid whole blood     Status: Abnormal   Result Value Ref Range    Lactic Acid 4.1 (HH) 0.7 - 2.0 mmol/L   CBC with platelets differential     Status: Abnormal    Narrative    The following orders were created for panel order CBC with platelets differential.  Procedure                               Abnormality         Status                     ---------                               -----------         ------                     CBC with platelets and d...[[id number]]  Abnormal            Final result                 Please view results for these tests on the individual orders.     Medications   0.9% sodium chloride BOLUS (0 mLs Intravenous Stopped 3/7/23 0619)     Followed by   sodium chloride 0.9% infusion ( Intravenous $New Bag 3/7/23 5225)   pharmacy alert - intermittent dosing (has no administration in time range)   vancomycin (VANCOCIN) 1,500 mg in 0.9% NaCl 250 mL intermittent infusion (has no administration in time range)   vancomycin (VANCOCIN) 1000 mg in dextrose 5% 200 mL PREMIX (has no administration in time range)   ondansetron (ZOFRAN) injection 4 mg (4 mg Intravenous $Given 3/7/23 0407)   0.9% sodium chloride BOLUS (1,000 mLs Intravenous $New Bag 3/7/23 0556)   iopamidol (ISOVUE-370) solution 104 mL (104 mLs Intravenous $Given 3/7/23 0529)   sodium chloride (PF) 0.9% PF flush 76 mL (76 mLs Intravenous $Given 3/7/23 0529)   acetaminophen (TYLENOL) tablet 650 mg (650 mg Oral $Given 3/7/23 0828)   piperacillin-tazobactam (ZOSYN) 3.375 g vial to attach to  mL bag (3.375 g  Intravenous $New Bag 3/7/23 1003)       Signed:  Betzaida Victoria MD  March 7, 2023 at 10:44 AM       Betzaida Victoria MD  03/07/23 1041

## 2023-03-07 NOTE — ED TRIAGE NOTES
Pt BIBA with C/O N/V/D since 1600 yesterday evening.  Began experiencing weakness and dizziness this morning and call EMS for emergency eval.  Denies bloody emesis, urine, but endorses 'red' stools, denies SOB, CP, HA.  VSS, pt is alert x 4 and in no distress.

## 2023-03-08 ENCOUNTER — APPOINTMENT (OUTPATIENT)
Dept: PHYSICAL THERAPY | Facility: CLINIC | Age: 83
DRG: 392 | End: 2023-03-08
Attending: HOSPITALIST
Payer: MEDICARE

## 2023-03-08 LAB
ALBUMIN SERPL BCG-MCNC: 2.9 G/DL (ref 3.5–5.2)
ALP SERPL-CCNC: 44 U/L (ref 35–104)
ALT SERPL W P-5'-P-CCNC: 7 U/L (ref 10–35)
ANION GAP SERPL CALCULATED.3IONS-SCNC: 11 MMOL/L (ref 7–15)
ANION GAP SERPL CALCULATED.3IONS-SCNC: 11 MMOL/L (ref 7–15)
AST SERPL W P-5'-P-CCNC: 23 U/L (ref 10–35)
BILIRUB SERPL-MCNC: 0.4 MG/DL
BUN SERPL-MCNC: 13.1 MG/DL (ref 8–23)
BUN SERPL-MCNC: 13.1 MG/DL (ref 8–23)
CALCIUM SERPL-MCNC: 7.7 MG/DL (ref 8.8–10.2)
CALCIUM SERPL-MCNC: 7.7 MG/DL (ref 8.8–10.2)
CHLORIDE SERPL-SCNC: 115 MMOL/L (ref 98–107)
CHLORIDE SERPL-SCNC: 115 MMOL/L (ref 98–107)
CREAT SERPL-MCNC: 1.04 MG/DL (ref 0.51–0.95)
CREAT SERPL-MCNC: 1.04 MG/DL (ref 0.51–0.95)
DEPRECATED HCO3 PLAS-SCNC: 18 MMOL/L (ref 22–29)
DEPRECATED HCO3 PLAS-SCNC: 18 MMOL/L (ref 22–29)
ERYTHROCYTE [DISTWIDTH] IN BLOOD BY AUTOMATED COUNT: 15.9 % (ref 10–15)
GFR SERPL CREATININE-BSD FRML MDRD: 53 ML/MIN/1.73M2
GFR SERPL CREATININE-BSD FRML MDRD: 53 ML/MIN/1.73M2
GLUCOSE BLDC GLUCOMTR-MCNC: 104 MG/DL (ref 70–99)
GLUCOSE BLDC GLUCOMTR-MCNC: 129 MG/DL (ref 70–99)
GLUCOSE BLDC GLUCOMTR-MCNC: 136 MG/DL (ref 70–99)
GLUCOSE BLDC GLUCOMTR-MCNC: 84 MG/DL (ref 70–99)
GLUCOSE BLDC GLUCOMTR-MCNC: 85 MG/DL (ref 70–99)
GLUCOSE BLDC GLUCOMTR-MCNC: 88 MG/DL (ref 70–99)
GLUCOSE SERPL-MCNC: 83 MG/DL (ref 70–99)
GLUCOSE SERPL-MCNC: 83 MG/DL (ref 70–99)
HCT VFR BLD AUTO: 36.3 % (ref 35–47)
HGB BLD-MCNC: 11.2 G/DL (ref 11.7–15.7)
MAGNESIUM SERPL-MCNC: 2 MG/DL (ref 1.7–2.3)
MCH RBC QN AUTO: 28.4 PG (ref 26.5–33)
MCHC RBC AUTO-ENTMCNC: 30.9 G/DL (ref 31.5–36.5)
MCV RBC AUTO: 92 FL (ref 78–100)
PHOSPHATE SERPL-MCNC: 2.1 MG/DL (ref 2.5–4.5)
PLATELET # BLD AUTO: 146 10E3/UL (ref 150–450)
POTASSIUM SERPL-SCNC: 3.1 MMOL/L (ref 3.4–5.3)
POTASSIUM SERPL-SCNC: 3.1 MMOL/L (ref 3.4–5.3)
POTASSIUM SERPL-SCNC: 3.9 MMOL/L (ref 3.4–5.3)
PROCALCITONIN SERPL IA-MCNC: 6.36 NG/ML
PROT SERPL-MCNC: 5.3 G/DL (ref 6.4–8.3)
RBC # BLD AUTO: 3.94 10E6/UL (ref 3.8–5.2)
SODIUM SERPL-SCNC: 144 MMOL/L (ref 136–145)
SODIUM SERPL-SCNC: 144 MMOL/L (ref 136–145)
WBC # BLD AUTO: 5.6 10E3/UL (ref 4–11)

## 2023-03-08 PROCEDURE — 250N000013 HC RX MED GY IP 250 OP 250 PS 637: Performed by: NURSE PRACTITIONER

## 2023-03-08 PROCEDURE — 999N000128 HC STATISTIC PERIPHERAL IV START W/O US GUIDANCE

## 2023-03-08 PROCEDURE — 999N000157 HC STATISTIC RCP TIME EA 10 MIN

## 2023-03-08 PROCEDURE — 258N000003 HC RX IP 258 OP 636: Performed by: NURSE PRACTITIONER

## 2023-03-08 PROCEDURE — 250N000011 HC RX IP 250 OP 636: Performed by: HOSPITALIST

## 2023-03-08 PROCEDURE — 36415 COLL VENOUS BLD VENIPUNCTURE: CPT | Performed by: NURSE PRACTITIONER

## 2023-03-08 PROCEDURE — 99233 SBSQ HOSP IP/OBS HIGH 50: CPT | Performed by: HOSPITALIST

## 2023-03-08 PROCEDURE — 250N000009 HC RX 250: Performed by: HOSPITALIST

## 2023-03-08 PROCEDURE — 84145 PROCALCITONIN (PCT): CPT | Performed by: NURSE PRACTITIONER

## 2023-03-08 PROCEDURE — 250N000013 HC RX MED GY IP 250 OP 250 PS 637: Performed by: HOSPITALIST

## 2023-03-08 PROCEDURE — 36415 COLL VENOUS BLD VENIPUNCTURE: CPT | Performed by: HOSPITALIST

## 2023-03-08 PROCEDURE — 120N000002 HC R&B MED SURG/OB UMMC

## 2023-03-08 PROCEDURE — 84132 ASSAY OF SERUM POTASSIUM: CPT | Performed by: HOSPITALIST

## 2023-03-08 PROCEDURE — 84100 ASSAY OF PHOSPHORUS: CPT | Performed by: HOSPITALIST

## 2023-03-08 PROCEDURE — 82040 ASSAY OF SERUM ALBUMIN: CPT | Performed by: HOSPITALIST

## 2023-03-08 PROCEDURE — 80053 COMPREHEN METABOLIC PANEL: CPT | Performed by: NURSE PRACTITIONER

## 2023-03-08 PROCEDURE — 83735 ASSAY OF MAGNESIUM: CPT | Performed by: INTERNAL MEDICINE

## 2023-03-08 PROCEDURE — 85027 COMPLETE CBC AUTOMATED: CPT | Performed by: NURSE PRACTITIONER

## 2023-03-08 PROCEDURE — 250N000011 HC RX IP 250 OP 636: Performed by: NURSE PRACTITIONER

## 2023-03-08 PROCEDURE — 97110 THERAPEUTIC EXERCISES: CPT | Mod: GP

## 2023-03-08 PROCEDURE — 250N000012 HC RX MED GY IP 250 OP 636 PS 637: Performed by: NURSE PRACTITIONER

## 2023-03-08 PROCEDURE — 258N000003 HC RX IP 258 OP 636: Performed by: HOSPITALIST

## 2023-03-08 PROCEDURE — 97116 GAIT TRAINING THERAPY: CPT | Mod: GP

## 2023-03-08 PROCEDURE — 97161 PT EVAL LOW COMPLEX 20 MIN: CPT | Mod: GP

## 2023-03-08 PROCEDURE — 999N000111 HC STATISTIC OT IP EVAL DEFER

## 2023-03-08 RX ORDER — POTASSIUM CHLORIDE 750 MG/1
40 TABLET, EXTENDED RELEASE ORAL ONCE
Status: COMPLETED | OUTPATIENT
Start: 2023-03-08 | End: 2023-03-08

## 2023-03-08 RX ORDER — LOPERAMIDE HCL 2 MG
2 CAPSULE ORAL 4 TIMES DAILY PRN
Status: DISCONTINUED | OUTPATIENT
Start: 2023-03-08 | End: 2023-03-09 | Stop reason: HOSPADM

## 2023-03-08 RX ADMIN — ALBUTEROL SULFATE 2 PUFF: 90 AEROSOL, METERED RESPIRATORY (INHALATION) at 17:24

## 2023-03-08 RX ADMIN — Medication 5 MG: at 08:47

## 2023-03-08 RX ADMIN — LORATADINE 10 MG: 10 TABLET ORAL at 08:46

## 2023-03-08 RX ADMIN — SODIUM CHLORIDE: 9 INJECTION, SOLUTION INTRAVENOUS at 09:51

## 2023-03-08 RX ADMIN — MONTELUKAST 10 MG: 10 TABLET, FILM COATED ORAL at 21:45

## 2023-03-08 RX ADMIN — PANTOPRAZOLE SODIUM 40 MG: 40 TABLET, DELAYED RELEASE ORAL at 08:46

## 2023-03-08 RX ADMIN — CLOPIDOGREL BISULFATE 75 MG: 75 TABLET ORAL at 08:46

## 2023-03-08 RX ADMIN — ALBUTEROL SULFATE 2 PUFF: 90 AEROSOL, METERED RESPIRATORY (INHALATION) at 11:48

## 2023-03-08 RX ADMIN — SODIUM CHLORIDE: 9 INJECTION, SOLUTION INTRAVENOUS at 00:48

## 2023-03-08 RX ADMIN — PIPERACILLIN AND TAZOBACTAM 3.38 G: 3; .375 INJECTION, POWDER, LYOPHILIZED, FOR SOLUTION INTRAVENOUS at 03:59

## 2023-03-08 RX ADMIN — HYDROXYCHLOROQUINE SULFATE 400 MG: 200 TABLET, FILM COATED ORAL at 08:47

## 2023-03-08 RX ADMIN — POTASSIUM PHOSPHATE, MONOBASIC AND POTASSIUM PHOSPHATE, DIBASIC 15 MMOL: 224; 236 INJECTION, SOLUTION, CONCENTRATE INTRAVENOUS at 14:10

## 2023-03-08 RX ADMIN — ASPIRIN 81 MG: 81 TABLET ORAL at 08:47

## 2023-03-08 RX ADMIN — POTASSIUM CHLORIDE 40 MEQ: 750 TABLET, EXTENDED RELEASE ORAL at 11:46

## 2023-03-08 RX ADMIN — Medication 50 MG: at 08:46

## 2023-03-08 RX ADMIN — FLUTICASONE FUROATE AND VILANTEROL TRIFENATATE 1 PUFF: 100; 25 POWDER RESPIRATORY (INHALATION) at 08:47

## 2023-03-08 RX ADMIN — PREDNISONE 5 MG: 5 TABLET ORAL at 08:46

## 2023-03-08 RX ADMIN — ESCITALOPRAM OXALATE 20 MG: 10 TABLET ORAL at 08:46

## 2023-03-08 RX ADMIN — MORPHINE SULFATE 2 MG: 2 INJECTION, SOLUTION INTRAMUSCULAR; INTRAVENOUS at 07:06

## 2023-03-08 ASSESSMENT — ACTIVITIES OF DAILY LIVING (ADL)
ADLS_ACUITY_SCORE: 31
DEPENDENT_IADLS:: INDEPENDENT
ADLS_ACUITY_SCORE: 31

## 2023-03-08 NOTE — PROGRESS NOTES
"1505 Sw spoke with RNCC Gm who reported discussion with provider regarding pt's \"arrangement\" with [facility]. Per chart review, pt is mobilizing at baseline with her walker and PT recommended discharge home w/assist. If pt wanted to discharge to a facility,she would need to privately pay for it. SW agreed to speak with pt.      1510 SW met with Brianna at bedside to discuss PT recommendatiions. SW introduced self and explained the reason for the visit. Her friends  Nghia and Yvette were present and participated in the conversation. They agreed to speak with SW.    SW explained that PT recommended discharge home with assist and that because of that it was unlikely that Medicare would cover a stay at the facility. They all expressed concern that Betty was too weak to go home and that Yvette wouldn't be able to properly assist when needed. Nghia advocated that PT meet with pt in the morning to reassess. SW agreed to give that request to PT.     BRAD spoke with PT Matt shortly thereafter regarding the request and their concerns and he agreed to speak with pt.    PT Matt explained that pt and friends were determined that pt discharge to TCU for several days prior to going home, and that they would privately pay for the stay if Medicare didn't cover it. SW agreed to speak with pt and friends and then make the referral.    Pt's friend Nghia had made phone calls to the [facility], with which she has affiliation. Per suggestion from Nghia, BRAD directed the referral to [facility]  Nichole Han (Ph: [phone]; Fax: [phone]).   [facility] [address]  [address]  Ph: [phone]  [phone] Nichole Han  Adm: [phone]-[place]  Fax: [phone]  1615  Initial SNF Referral sent via [employer] to SNF for review.    1800 SW met with pt and per suggestion from Nghia, also sent the referral to [facility] Administrator Francine " Priscilla (Ph: [phone]; Fax: [phone]).    SW will continue to follow as needed.    GINNA Marc, [facility]  ED/OBS   [facility]  Phone: [phone]  Pager: [phone]  Fax: [phone]     On-call pager, [phone], 4:00 pm to midnight

## 2023-03-08 NOTE — PLAN OF CARE
"Goal Outcome Evaluation:      Plan of Care Reviewed With: patient    Overall Patient Progress: no changeOverall Patient Progress: no change    /55 (BP Location: Left arm)   Pulse 64   Temp 98.5  F (36.9  C) (Oral)   Resp 18   Ht 1.651 m (5' 5\")   Wt 77.1 kg (170 lb)   SpO2 98%   BMI 28.29 kg/m      Shift: 1565-2054    Vitals: VSS  Activity: Up SBA w/walker  Pain: Pt c/o significant L hip pain, PRN morphine given with relief.  Neuro: A&Ox4  Cardiac: WDL, denies c/p  Respiratory: WDL, denies SOB, on RA  GI/: Voiding spontaneously, pt has had multiple loose BM's  Diet: Tolerating CLD  Lines:  PIV infusing MIVFs    "

## 2023-03-08 NOTE — PLAN OF CARE
"Goal Outcome Evaluation:  /85 (BP Location: Left arm, Patient Position: Chair)   Pulse 66   Temp 97.8  F (36.6  C)   Resp 16   Ht 1.651 m (5' 5\")   Wt 81.4 kg (179 lb 6.4 oz)   SpO2 98%   BMI 29.85 kg/m      Care from: 4328-1992      VS & Pain: Vital signs stable on RA. Pain reported to be in hip, denied any pain meds stating \"they don't work for me\". MD discussed multiple options with pt    Neuro: A&Ox4, able to make needs known. Speech clear and appropriate    Respiratory: WDL, no shortness of breath reported    Cardiac: WDL    GI/: Voiding spontaneously in toilet, x1 watery loose BM this shift.     Nutrition: Regular diet, fair appetite.    Musculoskeletal: Generalized weakness    Lines: R PIV running TKO     Skin: Discomfort and raw feeling around groin and buttocks area, barrier cream applied.    Activity: Up with SBA and walker    Events this shift: Lactic at 1.6, procalcitonin 6.36 - MD notified. Electrolytes replaced, recheck for tomorrow.    Plan: Seen by PT and OT, possible discharge set for tomorrow. Continue with plan of care.                         "

## 2023-03-08 NOTE — PLAN OF CARE
"EOSS:    Patient rested in intervals this shift. Multiple loose bowel movements overnight with incontinence. Stool positive for norovirus. She reports pain in her hip, declines any intervention besides her heating T-pump. Reports medications do not help her pain. She is alert and oriented x4, respirations regular and easy, speech is clear and appropriate, VSS on room air, and afebrile.     At 0700, requesting interventions for pain. Morphine given x1.       Problem: Plan of Care - These are the overarching goals to be used throughout the patient stay.    Goal: Plan of Care Review  Description: The Plan of Care Review/Shift note should be completed every shift.  The Outcome Evaluation is a brief statement about your assessment that the patient is improving, declining, or no change.  This information will be displayed automatically on your shift note.  Outcome: Progressing  Goal: Patient-Specific Goal (Individualized)  Description: You can add care plan individualizations to a care plan. Examples of Individualization might be:  \"Parent requests to be called daily at 9am for status\", \"I have a hard time hearing out of my right ear\", or \"Do not touch me to wake me up as it startles me\".  Outcome: Progressing  Goal: Absence of Hospital-Acquired Illness or Injury  Outcome: Progressing  Intervention: Identify and Manage Fall Risk  Recent Flowsheet Documentation  Taken 3/8/2023 0040 by Tere Corral, RN  Safety Promotion/Fall Prevention: assistive device/personal items within reach  Intervention: Prevent Skin Injury  Recent Flowsheet Documentation  Taken 3/8/2023 0040 by Tere Corral, RN  Body Position: position changed independently  Goal: Optimal Comfort and Wellbeing  Outcome: Progressing  Intervention: Monitor Pain and Promote Comfort  Recent Flowsheet Documentation  Taken 3/8/2023 0040 by Tere Corral, RN  Pain Management Interventions: declines  Goal: Readiness for Transition of Care  Outcome: Progressing   "   Problem: Infection  Goal: Absence of Infection Signs and Symptoms  Outcome: Progressing  Intervention: Prevent or Manage Infection  Recent Flowsheet Documentation  Taken 3/8/2023 0040 by Tere Corral, RN  Isolation Precautions: enteric precautions maintained   Goal Outcome Evaluation:

## 2023-03-08 NOTE — PROGRESS NOTES
[facility]    Medicine Progress Note - Hospitalist Service, GOLD TEAM 12    Date of Admission:  3/7/2023    Assessment & Plan      Betty Tee is a 82 year old female admitted on 3/7/2023. She has a history of of afib s/p Watchman, Sjogrens on 5mg prednisone daily and HCQ, severe hip OA pending SRI in 5/2023, presenting w/ severe acute n/v/d and dehydration.     #Severe sepsis from acute norovirus gastroenteritis (vomiting, diarrhea) with max lactate of 4.1, present on admission   #Lactic acidosis  - s/p vancomycin/zosyn (3/7 - 3/8) started in ED given high procal 13 while Blood cultures pending however norovirus appears to cause dramatic risk in procal in isolation and without ongoing leukocytosis, fevers or other infectious source stopped antibiotics to preserve microbiome. UA w/ 8 WBC and no urinary symptoms.   - lyte repletion, adequate PO 3/8: stopped NS MIVF given high NaCl acidosis though low threshold for LR IVF PRN  - loperamide PRN    #YOANNA on CKD III, baseline creatinine ~1.0, 1.5 on presentation  - suspect prerenal, MIVF  - allopurinol: dose-reduce 100 --> 50 per GFR 3/8, can resume 100 daily on discharge    #HTN  - hold home metoprolol (bradycarida), spironolactone, torsemide given hypovolemia    #Sjogrens / PMR / history of gout  - continue with home prednisone 5mg daily and HCQ 400mg daily and allopurinol     # Follicular bronchiolitis, prior mild ILD, 3L NC in ED  # Allergic rhinitis  - atelectasis on CXR, IC + ambulation when able  - continue with home Singulair, loratadine  - continue with home LABA/ICS  - continue with home albuterol q6hr     #Afib s/p Watchman 11/2023  #CAD  - c/w dapt through ~5/2023, then asa indefinitely  - continue with home atorvastatin 5mg daily  - hold home     #DMII  - last A1c 5.5 in 11/28/2022  - home: 24 U basaglar qAM, 8 novolog w/ meals TID  - here: sliding only pending trends given poor PO    #CASEY  - hold home PO  "iron while n/v/d/    #GERD  - continue with home PPI    #Mood / chronic pain / severe hip OA  - continue with home lexapro  - continue with home percocet PRN, robaxin         Diet: Advance Diet as Tolerated: Clear Liquid Diet    DVT Prophylaxis: Ambulate every shift  Kilpatrick Catheter: Not present  Lines: None     Cardiac Monitoring: None  Code Status: Full Code      Clinically Significant Risk Factors        # Hypokalemia: Lowest K = 3.1 mmol/L in last 2 days, will replace as needed      # Anion Gap Metabolic Acidosis: Highest Anion Gap = 20 mmol/L in last 2 days, will monitor and treat as appropriate  # Hypoalbuminemia: Lowest albumin = 2.9 g/dL at 3/8/2023  7:16 AM, will monitor as appropriate           # Overweight: Estimated body mass index is 29.85 kg/m  as calculated from the following:    Height as of this encounter: 1.651 m (5' 5\").    Weight as of this encounter: 81.4 kg (179 lb 6.4 oz)., PRESENT ON ADMISSION         Disposition Plan      Expected Discharge Date: 03/09/2023,  9:00 AM    Destination: home  Discharge Comments: [facility]in [facility] w/ PT/OT and pool therapy vs. [facility] have relationship w/ another TCU if that is nota avilable, appreciate help coordinating this options. Medically ready for discharge 3/9          Kevin Pressley MD  Hospitalist Service, GOLD TEAM 12  [facility]  Securely message with [employer] (more info)  Text page via [facility] Paging/Directory   See signed in provider for up to date coverage information  ______________________________________________________________________    Interval History   Still several loose bowel movement this morning, though much slowed. Severe hip pain non-responsive to 2mg IV morphine, doesn't want additional pain meds.    4 point ROS otherwise negative.     Physical Exam   Vital Signs: Temp: 97.8  F (36.6  C) Temp src: Oral BP: 133/85 Pulse: 66   Resp: 16 SpO2: 98 % O2 Device: None (Room air)  "   Weight: 179 lbs 6.4 oz      Physical Exam   Constitutional:  NAD  HEENT: EOMI  Neck: Symmetric  Cardiovascular: regular without murmurs or gallops  Pulmonary/Chest: CTAB. No respiratory distress.  GI: Soft, non-tender , non-distended    Musculoskeletal:  No lower extremity edema   Skin: Skin is warm and dry  Neurological: Alert and oriented e  Psychiatric:  euthymic      Medical Decision Making       60 MINUTES SPENT BY ME on the date of service doing chart review, history, exam, documentation & further activities per the note.      Data     I have personally reviewed the following data over the past 24 hrs:    5.6  \   11.2 (L)   / 146 (L)     144; 144 115 (H); 115 (H) 13.1; 13.1 /  136 (H)   3.1 (L); 3.1 (L) 18 (L); 18 (L) 1.04 (H); 1.04 (H) \       ALT: 7 (L) AST: 23 AP: 44 TBILI: 0.4   ALB: 2.9 (L) TOT PROTEIN: 5.3 (L) LIPASE: N/A       Procal: 6.36 (HH) CRP: N/A Lactic Acid: N/A         Imaging results reviewed over the past 24 hrs:   No results found for this or any previous visit (from the past 24 hour(s)).

## 2023-03-08 NOTE — PROGRESS NOTES
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 Home has 4 YESSI and patient asecneds with use of FWW.   Self-Care   Usual Activity Tolerance moderate   Current Activity Tolerance fair   Equipment Currently Used at Home walker, standard   Fall history within last six months no   Number of times patient has fallen within last six months   (patient denies any recent falls)   Activity/Exercise/Self-Care Comment Patient mod I at baseline with FWW but is quite limited by R hip pain. Patient is eagerly awaiting upcoming SRI.   General Information   Onset of Illness/Injury or Date of Surgery 03/07/23   Referring Physician Kevin Pressley MD   Patient/Family Therapy Goals Statement (PT) To improve hip pain   Pertinent History of Current Problem (include personal factors and/or comorbidities that impact the POC) Per EMR \"Betty Tee is a 82 year old female admitted on 3/7/2023. She has a history of of afib s/p Watchman, Sjogrens on 5mg prednisone daily and HCQ, severe hip OA pending SRI in 5/2023, presenting w/ severe acute n/v/d and dehydration.\"   General Observations activity: up with assist   Cognition   Affect/Mental Status (Cognition) WFL   Posture    Posture Not impaired   Range of Motion (ROM)   Range of Motion ROM is WFL   Strength (Manual Muscle Testing)   Strength (Manual Muscle Testing) strength is WFL   Strength Comments pain with all R LE functional movement   Bed Mobility   Bed Mobility supine-sit;sit-supine   Supine-Sit [place] (Bed Mobility) independent   Sit-Supine [place] (Bed Mobility) independent   Transfers   Transfers " sit-stand transfer   Sit-Stand Transfer   Sit-Stand [place] (Transfers) modified independence   Assistive Device (Sit-Stand Transfers) walker, front-wheeled   Gait/Stairs (Locomotion)   [place] Level (Gait) modified independence   Assistive Device (Gait) walker, front-wheeled   Comment, (Gait/Stairs) antalgic transfer but good functional strength   Balance   Balance Comments mod I stance with UE support, unable to tolerate single leg stance   Clinical Impression   Criteria for Skilled Therapeutic Intervention Yes, treatment indicated   PT Diagnosis (PT) impaired activity tolerance   Influenced by the following impairments decreased functional strength, impaired activity tolerance, pain   Functional limitations due to impairments gait, stairs   Clinical Presentation (PT Evaluation Complexity) Stable/Uncomplicated   Clinical Presentation Rationale clinical judgement   Clinical Decision Making (Complexity) low complexity   Planned Therapy Interventions (PT) balance training;bed mobility training;gait training;patient/family education;stair training;strengthening;stretching;transfer training   Risk & Benefits of therapy have been explained evaluation/treatment results reviewed;care plan/treatment goals reviewed;risks/benefits reviewed;current/potential barriers reviewed;participants voiced agreement with care plan;participants included;patient   PT Total Evaluation Time   PT Eval, Low Complexity Minutes (55586) 10   Physical Therapy Goals   PT Frequency Other (see comments)  (2-3x/week)   PT Predicted Duration/Target Date for Goal Attainment 03/22/23   PT Goals Bed Mobility;Transfers;Gait;Stairs   PT: Bed Mobility Independent;Supine to/from sit   PT: Transfers Modified independent;Sit to/from stand;Assistive device   PT: Gait Modified independent;Assistive device;Greater than 200 feet   PT: Stairs Modified independent;4 stairs;Assistive device   PT Discharge Planning   PT Plan gait with FWW, HEP   PT Discharge  Recommendation (DC Rec) home with assist   PT Rationale for DC Rec Patient mobilizing well and will be appropraite for return home when medically stable with continued support from friends until upcoming SRI.   PT Brief overview of current status mod I with FWW   Total Session Time   Total Session Time (sum of timed and untimed services) 10         Matt Light, PT, DPT  Pager #[phone]

## 2023-03-08 NOTE — PLAN OF CARE
Occupational Therapy: Orders received. Chart reviewed and discussed with PT and patient.? Occupational Therapy not indicated due to near baseline ADL status (pain is a limiting factor). Spoke with patient who denies specific ADL concerns regarding dressing, toileting, bathing. Noted to be Mod IND with walker during PT evaluation.? PT to follow for IP therapy needs and currently recommending home with assist. Patient noting possible interest in discharge to a facility for pool therapies. Defer discharge recommendations to PT/medical team.? Will complete OT orders. Please re-consult if new concerns arise/OT needs arise.

## 2023-03-08 NOTE — PLAN OF CARE
"Goal Outcome Evaluation:      Plan of Care Reviewed With: patient    Overall Patient Progress: no change  Shift: 7199-6738    Neuro: A&O x4 able to make needs known  Cardiac: VSS  Respiratory: On RA; no cough noted  GI/: Pt denies any nausea at this time; pt is having multiple loose, watery stools that are yellow in color.   Diet: Clears   Activity: Up Ax1 with walker and GB   Pain: Pt reports constant, chronic L hip pain that is being managed with PRN morphine and heat   LDA: PIV with IVMF   VS: /60 (BP Location: Left arm)   Pulse 60   Temp 98.8  F (37.1  C) (Oral)   Resp 18   Ht 1.651 m (5' 5\")   Wt 77.1 kg (170 lb)   SpO2 98%   BMI 28.29 kg/m             "

## 2023-03-08 NOTE — CONSULTS
"Care Management Initial Consult    General Information  Assessment completed with: Patient,    Type of CM/SW Visit: Initial Assessment    Primary Care Provider verified and updated as needed: Yes   Readmission within the last 30 days: no previous admission in last 30 days      Reason for Consult: discharge planning  Advance Care Planning:            Communication Assessment  Patient's communication style: spoken language (English or Bilingual)    Hearing Difficulty or Deaf: no   Wear Glasses or Blind: yes    Cognitive  Cognitive/Neuro/Behavioral: WDL                      Living Environment:   People in home: other (see comments) (\"sister\" a nun)     Current living Arrangements: house      Able to return to prior arrangements: yes       Family/Social Support:  Care provided by: self  Provides care for: no one                Description of Support System:           Current Resources:   Patient receiving home care services: No     Community Resources: None  Equipment currently used at home: walker, standard  Supplies currently used at home: None    Employment/Financial:  Employment Status:          Financial Concerns:             Lifestyle & Psychosocial Needs:  Social Determinants of Health     Tobacco Use: Medium Risk     Smoking Tobacco Use: Former     Smokeless Tobacco Use: Never     Passive Exposure: Not on file   Alcohol Use: Not on file   Financial Resource Strain: Not on file   Food Insecurity: Not on file   Transportation Needs: Not on file   Physical Activity: Not on file   Stress: Not on file   Social Connections: Not on file   Intimate Partner Violence: Not on file   Depression: Not at risk     PHQ-2 Score: 2   Housing Stability: Not on file       Functional Status:  Prior to admission patient needed assistance:   Dependent ADLs:: Ambulation-walker, Independent  Dependent IADLs:: Independent       Mental Health Status:          Chemical Dependency Status:                Values/Beliefs:  Spiritual, Cultural " "Beliefs, Gnosticist Practices, Values that affect care:                 Additional Information:      Care Management Discharge Note    Discharge Date: 03/09/2023       Discharge Disposition: Home    Discharge Services:  none    Discharge DME:  none    Discharge Transportation: family or friend will provide    Private pay costs discussed: Not applicable    PAS Confirmation Code:  n/a  Patient/family educated on Medicare website which has current facility and service quality ratings: no    Education Provided on the Discharge Plan:  yes  Persons Notified of Discharge Plans: patient  Patient/Family in Agreement with the Plan:  yes    Handoff Referral Completed: Yes    Additional Information:  RNCC called patient for case management assessment completed for elevated risk unplanned readmission. Patient comes from home, a house, lives with a nun whom she refers to \"sister\" (no relation). Patient has a walker for home use, is otherwise independent at baseline. Patient has recs for return home by PT. Patient does not endorse discharge planning needs. RNCC went over IMM form, received verbal for signature and [facility] ([phone]). RNCC available as needed.    NORMA KathleenN, BA, RN, CMSRN, RNCC  Covering Units 6D/OBS  Pager: [phone]  Phone: [phone]  6th floor Weekend/Holiday Pager: [phone]  Observation weekend/after hours: [phone]                "

## 2023-03-09 VITALS
SYSTOLIC BLOOD PRESSURE: 145 MMHG | RESPIRATION RATE: 16 BRPM | WEIGHT: 179.4 LBS | DIASTOLIC BLOOD PRESSURE: 57 MMHG | TEMPERATURE: 98.2 F | BODY MASS INDEX: 29.89 KG/M2 | OXYGEN SATURATION: 99 % | HEIGHT: 65 IN | HEART RATE: 63 BPM

## 2023-03-09 LAB
ANION GAP SERPL CALCULATED.3IONS-SCNC: 9 MMOL/L (ref 7–15)
BUN SERPL-MCNC: 5.6 MG/DL (ref 8–23)
CALCIUM SERPL-MCNC: 8.3 MG/DL (ref 8.8–10.2)
CHLORIDE SERPL-SCNC: 115 MMOL/L (ref 98–107)
CREAT SERPL-MCNC: 0.81 MG/DL (ref 0.51–0.95)
DEPRECATED HCO3 PLAS-SCNC: 17 MMOL/L (ref 22–29)
ERYTHROCYTE [DISTWIDTH] IN BLOOD BY AUTOMATED COUNT: 16 % (ref 10–15)
GFR SERPL CREATININE-BSD FRML MDRD: 72 ML/MIN/1.73M2
GLUCOSE BLDC GLUCOMTR-MCNC: 105 MG/DL (ref 70–99)
GLUCOSE BLDC GLUCOMTR-MCNC: 131 MG/DL (ref 70–99)
GLUCOSE BLDC GLUCOMTR-MCNC: 97 MG/DL (ref 70–99)
GLUCOSE SERPL-MCNC: 84 MG/DL (ref 70–99)
HBA1C MFR BLD: 7.3 %
HCT VFR BLD AUTO: 35.5 % (ref 35–47)
HGB BLD-MCNC: 10.7 G/DL (ref 11.7–15.7)
MAGNESIUM SERPL-MCNC: 1.8 MG/DL (ref 1.7–2.3)
MCH RBC QN AUTO: 27.9 PG (ref 26.5–33)
MCHC RBC AUTO-ENTMCNC: 30.1 G/DL (ref 31.5–36.5)
MCV RBC AUTO: 93 FL (ref 78–100)
PHOSPHATE SERPL-MCNC: 2.1 MG/DL (ref 2.5–4.5)
PLATELET # BLD AUTO: 141 10E3/UL (ref 150–450)
POTASSIUM SERPL-SCNC: 3.6 MMOL/L (ref 3.4–5.3)
RBC # BLD AUTO: 3.83 10E6/UL (ref 3.8–5.2)
SODIUM SERPL-SCNC: 141 MMOL/L (ref 136–145)
WBC # BLD AUTO: 5.8 10E3/UL (ref 4–11)

## 2023-03-09 PROCEDURE — 83036 HEMOGLOBIN GLYCOSYLATED A1C: CPT | Performed by: HOSPITALIST

## 2023-03-09 PROCEDURE — 250N000013 HC RX MED GY IP 250 OP 250 PS 637: Performed by: NURSE PRACTITIONER

## 2023-03-09 PROCEDURE — 250N000011 HC RX IP 250 OP 636: Performed by: NURSE PRACTITIONER

## 2023-03-09 PROCEDURE — 250N000013 HC RX MED GY IP 250 OP 250 PS 637: Performed by: HOSPITALIST

## 2023-03-09 PROCEDURE — 84100 ASSAY OF PHOSPHORUS: CPT | Performed by: HOSPITALIST

## 2023-03-09 PROCEDURE — 85027 COMPLETE CBC AUTOMATED: CPT | Performed by: HOSPITALIST

## 2023-03-09 PROCEDURE — 99239 HOSP IP/OBS DSCHRG MGMT >30: CPT | Performed by: HOSPITALIST

## 2023-03-09 PROCEDURE — 36415 COLL VENOUS BLD VENIPUNCTURE: CPT | Performed by: HOSPITALIST

## 2023-03-09 PROCEDURE — 250N000012 HC RX MED GY IP 250 OP 636 PS 637: Performed by: NURSE PRACTITIONER

## 2023-03-09 PROCEDURE — 83735 ASSAY OF MAGNESIUM: CPT | Performed by: HOSPITALIST

## 2023-03-09 PROCEDURE — 82310 ASSAY OF CALCIUM: CPT | Performed by: HOSPITALIST

## 2023-03-09 PROCEDURE — 250N000011 HC RX IP 250 OP 636: Performed by: HOSPITALIST

## 2023-03-09 RX ORDER — INSULIN LISPRO 100 [IU]/ML
INJECTION, SOLUTION INTRAVENOUS; SUBCUTANEOUS
COMMUNITY
Start: 2023-03-09

## 2023-03-09 RX ADMIN — ESCITALOPRAM OXALATE 20 MG: 10 TABLET ORAL at 08:53

## 2023-03-09 RX ADMIN — CLOPIDOGREL BISULFATE 75 MG: 75 TABLET ORAL at 08:53

## 2023-03-09 RX ADMIN — POTASSIUM & SODIUM PHOSPHATES POWDER PACK 280-160-250 MG 1 PACKET: 280-160-250 PACK at 08:53

## 2023-03-09 RX ADMIN — ALBUTEROL SULFATE 2 PUFF: 90 AEROSOL, METERED RESPIRATORY (INHALATION) at 11:29

## 2023-03-09 RX ADMIN — Medication 12.5 MG: at 11:29

## 2023-03-09 RX ADMIN — ALBUTEROL SULFATE 2 PUFF: 90 AEROSOL, METERED RESPIRATORY (INHALATION) at 06:33

## 2023-03-09 RX ADMIN — FLUTICASONE FUROATE AND VILANTEROL TRIFENATATE 1 PUFF: 100; 25 POWDER RESPIRATORY (INHALATION) at 08:53

## 2023-03-09 RX ADMIN — Medication 50 MG: at 08:53

## 2023-03-09 RX ADMIN — HYDROXYCHLOROQUINE SULFATE 400 MG: 200 TABLET, FILM COATED ORAL at 08:53

## 2023-03-09 RX ADMIN — PANTOPRAZOLE SODIUM 40 MG: 40 TABLET, DELAYED RELEASE ORAL at 08:53

## 2023-03-09 RX ADMIN — PREDNISONE 5 MG: 5 TABLET ORAL at 08:53

## 2023-03-09 RX ADMIN — POTASSIUM & SODIUM PHOSPHATES POWDER PACK 280-160-250 MG 1 PACKET: 280-160-250 PACK at 11:29

## 2023-03-09 RX ADMIN — Medication 12.5 MG: at 08:53

## 2023-03-09 RX ADMIN — ONDANSETRON 4 MG: 4 TABLET, ORALLY DISINTEGRATING ORAL at 13:28

## 2023-03-09 RX ADMIN — MORPHINE SULFATE 2 MG: 2 INJECTION, SOLUTION INTRAMUSCULAR; INTRAVENOUS at 00:30

## 2023-03-09 RX ADMIN — LORATADINE 10 MG: 10 TABLET ORAL at 08:53

## 2023-03-09 RX ADMIN — ASPIRIN 81 MG: 81 TABLET ORAL at 08:53

## 2023-03-09 RX ADMIN — ACETAMINOPHEN 1000 MG: 500 TABLET ORAL at 03:55

## 2023-03-09 RX ADMIN — METHOCARBAMOL 750 MG: 750 TABLET ORAL at 03:55

## 2023-03-09 RX ADMIN — Medication 5 MG: at 08:53

## 2023-03-09 ASSESSMENT — ACTIVITIES OF DAILY LIVING (ADL)
ADLS_ACUITY_SCORE: 31

## 2023-03-09 NOTE — PROGRESS NOTES
Care Management Discharge Note    Discharge Date: 03/09/2023       Discharge Disposition: Home    Discharge Services:      Discharge DME:      Discharge Transportation: family or friend will provide    Private pay costs discussed: Not applicable    PAS Confirmation Code:  (N/A)  Patient/family educated on Medicare website which has current facility and service quality ratings: yes    Education Provided on the Discharge Plan:    Persons Notified of Discharge Plans: Pt; MD; RN  Patient/Family in Agreement with the Plan: yes    Handoff Referral Completed: No    Additional Information:  Writer met with pt and updated her on [facility] being full. Pt reports that she is agreeable to discharging home and she does not wish to try other SNFs.    Writer paged MD w/ update.    Referrals have been made to the following facilities and their status is as follows:    [facility]  P: [phone]  P: [phone] - Admissions   F: [phone]  - Writer spoke with Nichole in admissions. SNF is full. Nichole recommended recommended trying [facility]. Writer has ceased following this referral.  ________________    GINNA Mendoza, [facility]  ED/Observation   [facility]  Phone: [phone]  Pager: [phone]  Fax: [phone]    On-call pager, [phone], 4:00pm to midnight

## 2023-03-09 NOTE — PLAN OF CARE
"/85 (BP Location: Left arm, Patient Position: Chair)   Pulse 66   Temp 97.8  F (36.6  C)   Resp 16   Ht 1.651 m (5' 5\")   Wt 81.4 kg (179 lb 6.4 oz)   SpO2 98%   BMI 29.85 kg/m     Time: 4995-3109   Reason for admission: gastroenteritis.   Activity: SBA  Pain: C/o R hip and LE pain, received PRN IV morphine, Tylenol, and Robaxin, and it was effective.   Neuro: alert and oriented.   Cardiac: WDL  Resp: denied SOB, lung sounds clear bilaterally.   GI/: regular diet, voids without difficulties, bowel sounds present x four quadrants.   Lines: R PIV, saline locked.   Skin: WDL  Labs/Imaging: BG at 2200, 104, BG at 0200, 97.   New changes to shift: no change.   Plan: continue with current plan of cares.                         "

## 2023-03-09 NOTE — DISCHARGE SUMMARY
Discharge paperwork reviewed with patient. Pt has no other questions and understands. IV removed prior to leaving. Pt belongings sent with.

## 2023-03-09 NOTE — PROGRESS NOTES
Pt refused CPAP 2 nights in a row (see flowsheets). CPAP order has been discontinued.     Mya Cuevas, RT on 3/9/2023 at 12:36 AM

## 2023-03-09 NOTE — DISCHARGE INSTRUCTIONS
MEDICATION CHANGES  INSULIN  Check your blood sugar before each meal (three times a day):  Do Not give Correction Insulin if blood sugar  <150.  For blood sugar 150 - 200 give 2 units of short acting insulin lispro  For blood sugar 250 - 300 give 6 units of short acting insulin lispro  For blood sugar  300 - 350 give 10 units of short acting insulin lispro  For blood sugar  greater than 350 give 14 unit of short acting insulin lispro  Records your blood sugar readings and bring them to Dr. Tristan at your follow up appointment, where you will discuss if you should restart your long-acting insulin glargine  SPIRONOLACTONE  Do not take until you see Dr. Tristan  TORSEMIDE  Do not take until you see Dr. Tristan  OR if you weight increases by 3 lbs in one day or 5 lbs in one week:  Take torsemide 40 mg once  POTASSIUM SUPPLEMENT (KLOR-CON)  Do not take until you see Dr. Diego  METOPROLOL  Take as usual: 50mg in morning and 50mg in evening  IRON PILL (ferrous gluconate)  Do not take for 1 week as it can cause upset stomach, then you can restart it      It has been a pleasure caring for you,  - Your [facility] Medicine Team

## 2023-03-10 NOTE — PLAN OF CARE
Physical Therapy Discharge Summary    Reason for therapy discharge:    Discharged to home.    Progress towards therapy goal(s). See goals on Care Plan in [facility] electronic health record for goal details.  Goals partially met.  Barriers to achieving goals:   discharge from facility.    Therapy recommendation(s):    No further therapy is recommended.

## 2023-03-10 NOTE — DISCHARGE SUMMARY
[facility]  Hospitalist Discharge Summary      Date of Admission:  3/7/2023  Date of Discharge:  3/9/2023  3:04 PM  Discharging Provider: Kevin Pressley MD  Discharge Service: Hospitalist Service, GOLD TEAM 12    Discharge Diagnoses   See hospital course    Follow-ups Needed After Discharge   Follow-up Appointments     Adult [facility] Follow-up and recommended labs and tests      Follow up with primary care provider, Ilene Tristan, as   scheduled.  The following labs/tests are recommended: scheduled labs next   week, including BMP and CBC.      Appointments on [place] and/or [facility] (with [facility] or [facility]   provider or service). Call [phone] if you haven't heard regarding   these appointments within 7 days of discharge.            PCP: Consider resuming torsemide, spironolactone pending follow up labs, and resumption of long-acting insulin (discharge with instruction do to sliding scale given low blood sugar relatively while admitted)      Unresulted Labs Ordered in the Past 30 Days of this Admission     Date and Time Order Name Status Description    3/7/2023  8:34 AM Blood Culture Peripheral Blood Preliminary     3/7/2023  8:34 AM Blood Culture Peripheral Blood Preliminary       These results will be followed up by hospital medicine    Discharge Disposition   Discharged to home  Condition at discharge: Stable      Hospital Course   Betty Tee is a 82 year old female admitted on 3/7/2023. She has a history of of afib s/p Watchman, Sjogrens on 5mg prednisone daily and HCQ, severe hip OA pending SRI in 5/2023, presenting w/ severe acute n/v/d and dehydration.     #Sepsis considered and ruled out  # Acute norovirus gastroenteritis (vomiting, diarrhea) with hypovolemia and max lactate of 4.1, present on admission   #Lactic acidosis  - s/p vancomycin/zosyn (3/7 - 3/8) started in ED given high procal 13 while Blood cultures pending however norovirus  appears to cause dramatic risk in procal in isolation and without ongoing leukocytosis, fevers or other infectious source stopped antibiotics to preserve microbiome. UA w/ 8 WBC and no urinary symptoms.   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  - continue with home prednisone 5mg daily and HCQ 400mg daily and allopurinol     # Follicular bronchiolitis, prior mild ILD, 3L NC in ED  # Allergic rhinitis  - atelectasis on CXR, IC + ambulation when able  - continue with home Singulair, loratadine  - continue with home LABA/ICS  - continue with home albuterol q6hr     #Afib s/p Watchman 11/2023  #CAD  - c/w dapt through ~5/2023, then asa indefinitely  - continue with home atorvastatin 5mg daily  - hold home     #DMII  - last A1c 5.5 in 11/28/2022  - home: 24 U basaglar qAM, 8 novolog w/ meals TID  - here: blood sugar max 130's on negliible slidicing scale insulin used  - for discharge: instructed to follow medium dose sliding scale and TID blood sugars prandially, hold glargine until PCP follow up and bring blood sugar trend    #CASEY  - hold home PO iron while n/v/d/ for 1 more week    #GERD  - continue with home PPI    #Mood / chronic pain / severe hip OA  - continue with home lexapro  - continue with home percocet PRN, robaxin        Consultations This Hospital Stay   PHARMACY TO DOSE VANCO  PHYSICAL THERAPY ADULT IP CONSULT  OCCUPATIONAL THERAPY ADULT IP CONSULT  NURSING TO CONSULT FOR VASCULAR ACCESS CARE IP CONSULT  CARE MANAGEMENT / SOCIAL WORK IP CONSULT  SOCIAL WORK IP CONSULT    Code Status   Prior    Time Spent on this  Encounter   I, Kevin Pressley MD, personally saw the patient today and spent greater than 30 minutes discharging this patient.       Kevin Pressley MD  [facility] [address] [address]  Phone: [phone]  Fax: [phone]  ______________________________________________________________________    Physical Exam   Vital Signs: Temp: 98.2  F (36.8  C) Temp src: Oral BP: (!) 145/57 Pulse: 63   Resp: 16 SpO2: 99 % O2 Device: None (Room air)    Weight: 179 lbs 6.4 oz    Physical Exam   Constitutional:  NAD  HEENT: EOMI  Neck: Symmetric  Cardiovascular: regular without murmurs or gallops  Pulmonary/Chest: CTAB. No respiratory distress.  GI: Soft, non-tender , non-distended    Musculoskeletal:  No lower extremity edema   Skin: Skin is warm and dry  Neurological: Alert and oriented   Psychiatric:  euthymic         Primary Care Physician   Ilene Tristan    Discharge Orders      CBC with platelets     Magnesium     Phosphorus     Basic metabolic panel     Medication Therapy Management Referral      Primary Care - Care Coordination Referral      Reason for your hospital stay    You were admitted for norovirus and improved with fluids. See instructions for medication changes    It has been a pleasure caring for you,  - Your [facility] Medicine Team     Activity    Your activity upon discharge: activity as tolerated     Adult [facility] Follow-up and recommended labs and tests    Follow up with primary care provider, Ilene Tristan, as scheduled.  The following labs/tests are recommended: scheduled labs next week, including BMP and CBC.      Appointments on [place] and/or [facility] (with [facility] or [facility] provider or service). Call [phone] if you haven't heard regarding these appointments within 7 days of discharge.     Diet    Follow this diet upon discharge: Orders Placed This Encounter      Regular Diet Adult        Significant Results and Procedures   Most Recent 3 CBC's:Recent Labs   Lab Test 03/09/23  0626 03/08/23  1130 03/07/23  0359   WBC 5.8 5.6 8.6   HGB 10.7* 11.2* 16.0*   MCV 93 92 89   * 146* 220   *16 hemoglobin was hemoconcentrated      Discharge Medications   Discharge Medication List as of 3/9/2023  2:28 PM      CONTINUE these medications which have CHANGED    Details   insulin lispro (HUMALOG KWIKPEN) 100 UNIT/ML (1 unit dial) KWIKPEN Inject 8 Units Subcutaneous 3 times daily (before meals), Historical         CONTINUE these medications which have NOT CHANGED    Details   acetaminophen (TYLENOL) 500 MG tablet Take 1,000 mg by mouth every 8 hours as needed (max 6 tablets/24 hours, 2 tablets/dose), Historical      acyclovir (ZOVIRAX) 400 MG tablet Take 1 tablet (400 mg) by mouth 3 times daily as needed For a couple days, Disp-15 tablet, R-2, Historical      acyclovir (ZOVIRAX) 5 % external ointment Apply topically as needed (6 times day PRN for outbreaks) As needed for outbreaksDisp-15 g, R-3Historical      albuterol (PROAIR HFA/PROVENTIL HFA/VENTOLIN HFA) 108 (90 Base) MCG/ACT inhaler Inhale 2 puffs into the lungs every 6 hours, Disp-18 g, R-11, E-PrescribePharmacy may dispense brand covered by insurance (Proair, or proventil or ventolin or generic albuterol inhaler)      alendronate (FOSAMAX) 70 MG tablet Take 1 tablet (70 mg) by mouth every 7 days, Disp-4 tablet, R-1, E-Prescribe      allopurinol (ZYLOPRIM) 100 MG tablet Take 1 tablet (100 mg) by mouth daily, Disp-90 tablet, R-1, E-Prescribe      anakinra (KINERET) 100 MG/0.67ML SOSY injection 100 mg every day x 3 days as needed for flare ups, Disp-6.7 mL, R-3, E-Prescribe      aspirin (ASA) 81 MG EC tablet Take 1 tablet (81 mg) by mouth daily, Disp-90 tablet, R-1, E-Prescribe      atorvastatin (LIPITOR) 10 MG tablet TAKE 1/2 TABLET BY MOUTH EVERY DAY, Disp-45 tablet, R-0, E-Prescribe      azithromycin (ZITHROMAX) 250 MG tablet TAKE 1 TABLET BY  MOUTH EVERY DAY, Disp-30 tablet, R-5, E-Prescribe      cevimeline (EVOXAC) 30 MG capsule Take 1 capsule (30 mg) by mouth 3 times daily, Disp-90 capsule, R-11, E-Prescribe      Cholecalciferol (VITAMIN D3) 50 MCG (2000 UT) CAPS TAKE 100 MCG BY MOUTH DAILY (TAKE 2 TABLET (50 MCG) BY MOUTH DAILY - ORAL), Disp-180 capsule, R-0, E-Prescribe      clopidogrel (PLAVIX) 75 MG tablet Take 1 tablet (75 mg) by mouth daily, Disp-90 tablet, R-1, E-Prescribe      cyanocobalamin (VITAMIN B-12) 1000 MCG tablet Take 1 tablet (1,000 mcg) by mouth daily, Disp-30 tablet, R-1, E-Prescribe      escitalopram (LEXAPRO) 20 MG tablet TAKE 1 TABLET BY MOUTH EVERY DAY, Disp-90 tablet, R-0, E-Prescribe      ferrous gluconate (FERGON) 324 (38 Fe) MG tablet Take 1 tablet (324 mg) by mouth daily (with breakfast), Disp-90 tablet, R-1, E-Prescribe      fluticasone (FLONASE) 50 MCG/ACT nasal spray SPRAY 1-2 SPRAYS INTO BOTH NOSTRILS DAILY AS NEEDED FOR ALLERGIES, Disp-16 mL, R-11, E-Prescribe      fluticasone-vilanterol (BREO ELLIPTA) 100-25 MCG/INH inhaler Inhale 1 puff into the lungs daily, Disp-1 each, R-11, E-Prescribe      hydroxychloroquine (PLAQUENIL) 200 MG tablet Take 2 tablets (400 mg) by mouth daily Get annual eye exams for hydroxychloroquine (Plaquenil) monitoring and fax to [phone], Disp-180 tablet, R-3, E-Prescribe      insulin glargine (BASAGLAR KWIKPEN) 100 UNIT/ML pen 3INJECT 24 UNITS SUBCUTANEOUS DAILY (TO REPLACE LANTUS), Disp-15 mL, R-1, E-Prescribe      ketoconazole (NIZORAL) 2 % external cream Apply topically 2 times daily as needed for itchingDisp-60 g, [id number]      KLOR-CON 20 MEQ CR tablet TAKE 2 TABLETS (40 MEQ) BY MOUTH EVERY MORNING AND 1 TABLET (20 MEQ) EVERY EVENING., Disp-270 tablet, R-3, E-Prescribe      lidocaine (LIDODERM) 5 % patch APPLY PATCH TO PAINFUL AREA FOR UP TO 12 H WITHIN A 24 H PERIOD. REMOVE AFTER 12 HOURS.Disp-30 patch, [id number] Code Needed  Sacral back pain [M53.3]      loratadine  (CLARITIN) 10 MG tablet TAKE 1 TABLET BY MOUTH EVERY DAY, Disp-90 tablet, R-3, E-Prescribe      methocarbamol (ROBAXIN) 750 MG tablet Take 1 tablet (750 mg) by mouth 3 times daily as needed for muscle spasms, Disp-90 tablet, R-3, E-Prescribe      metoprolol succinate ER (TOPROL XL) 50 MG 24 hr tablet Take 1 tablet (50 mg) by mouth 2 times daily, Disp-90 tablet, R-3, E-Prescribe      montelukast (SINGULAIR) 10 MG tablet TAKE 1 TABLET BY MOUTH EVERYDAY AT BEDTIME, Disp-90 tablet, R-0, E-Prescribe      oxyCODONE-acetaminophen (PERCOCET) 5-325 MG tablet Take 1 tablet by mouth 2 times daily as needed for pain May fill and start on 9/21/2022. For chronic pain., Disp-20 tablet, R-0, E-Prescribe      OZEMPIC, 1 MG/DOSE, 4 MG/3ML pen INJECT 1 MG SUBCUTANEOUS ONCE A WEEK, Disp-3 mL, R-1, E-Prescribe      pantoprazole (PROTONIX) 40 MG EC tablet Take 1 tablet (40 mg) by mouth daily (replaces famotidine- should stop taking famotidine), Disp-90 tablet, R-3, E-Prescribe      predniSONE (DELTASONE) 5 MG tablet Take 1 tablet (5 mg) by mouth daily, Disp-90 tablet, R-1, E-Prescribe      spironolactone (ALDACTONE) 25 MG tablet Take 2 tablets (50 mg) by mouth daily, Disp-180 tablet, R-3, E-PrescribeDX Code Needed  .      !! torsemide (DEMADEX) 10 MG tablet TAKE ONE TAB (10 MG) WITH ONE 20 MG TAB TO = 30 MG DAILY IN AFTERNOON., Disp-90 tablet, R-3, E-Prescribe      !! torsemide (DEMADEX) 20 MG tablet TAKE 2 TABLETS (40 MG) BY MOUTH EVERY MORNING AND 1 TABLET (20 MG) DAILY AT 2 PM. TAKE THE AFTERNOON DOSE WITH AN EXTRA 10 MG TAB FOR A TOTAL OF 30 MG IN THE AFTERNOON, Disp-270 tablet, R-3, E-Prescribe      traZODone (DESYREL) 50 MG tablet TAKE 3 TABLETS (150 MG) BY MOUTH NIGHTLY AS NEEDED FOR SLEEP, Disp-270 tablet, R-0, E-Prescribe      ACCU-CHEK SHANNON PLUS test strip USE TO TEST BLOOD SUGARS 3 TIMES DAILY, Disp-300 strip, R-5, E-Prescribe      BD PEN NEEDLE JANE 2ND GEN 32G X 4 MM miscellaneous USE TO TEST 4 TIMES A DAY, Disp-400 each,  R-1, E-Prescribe      blood glucose (NO BRAND SPECIFIED) lancets standard Use to test blood sugar 3 times daily or as directedDisp-100 lancet, [id number] click lancets      COMPOUNDED NON-CONTROLLED SUBSTANCE (CMPD RX) - PHARMACY TO MIX COMPOUNDED MEDICATION Estriol 1 mg/g Apply small amount to finger and apply to inside vagina daily for 2 weeks then twice weekly Route: vaginally Dispense 30 grams 11 refills, Disp-30 g, R-11, E-Prescribe      oxyCODONE-acetaminophen (PERCOCET) 7.5-325 MG per tablet Take 1 tablet by mouth 2 times daily as needed for severe pain (7-10) With at least 4 hours between doses. Fill and start 2/17/23, Disp-60 tablet, R-0, E-Prescribe       !! - Potential duplicate medications found. Please discuss with provider.      STOP taking these medications       ferrous sulfate (FEROSUL) 325 (65 Fe) MG tablet Comments:   Reason for Stopping:             Allergies   Allergies   Allergen Reactions     Amoxicillin-Pot Clavulanate      Augmentin Nausea and Vomiting     Codeine Nausea and Vomiting     PN: LW Reaction: HIVES     Penicillins Nausea and Vomiting     PN: LW Reaction: GI Upset     Phenobarbital Itching     Seasonal Allergies

## 2023-03-12 LAB
BACTERIA BLD CULT: NO GROWTH
BACTERIA BLD CULT: NO GROWTH

## 2023-03-14 ENCOUNTER — LAB (OUTPATIENT)
Dept: LAB | Facility: CLINIC | Age: 83
End: 2023-03-14
Payer: MEDICARE

## 2023-03-14 DIAGNOSIS — E55.9 VITAMIN D DEFICIENCY: ICD-10-CM

## 2023-03-14 DIAGNOSIS — D50.9 IRON DEFICIENCY ANEMIA, UNSPECIFIED IRON DEFICIENCY ANEMIA TYPE: ICD-10-CM

## 2023-03-14 DIAGNOSIS — E78.5 HYPERLIPIDEMIA LDL GOAL <100: ICD-10-CM

## 2023-03-14 DIAGNOSIS — I10 ESSENTIAL HYPERTENSION WITH GOAL BLOOD PRESSURE LESS THAN 140/90: ICD-10-CM

## 2023-03-14 DIAGNOSIS — Z79.4 TYPE 2 DIABETES MELLITUS WITH HYPERGLYCEMIA, WITH LONG-TERM CURRENT USE OF INSULIN (H): ICD-10-CM

## 2023-03-14 DIAGNOSIS — R79.89 ELEVATED PARATHYROID HORMONE: ICD-10-CM

## 2023-03-14 DIAGNOSIS — K31.819 GAVE (GASTRIC ANTRAL VASCULAR ECTASIA): ICD-10-CM

## 2023-03-14 DIAGNOSIS — K92.2 LOWER GI BLEED: ICD-10-CM

## 2023-03-14 DIAGNOSIS — E11.65 TYPE 2 DIABETES MELLITUS WITH HYPERGLYCEMIA, WITH LONG-TERM CURRENT USE OF INSULIN (H): ICD-10-CM

## 2023-03-14 LAB
ALBUMIN SERPL BCG-MCNC: 3.3 G/DL (ref 3.5–5.2)
ALP SERPL-CCNC: 65 U/L (ref 35–104)
ALT SERPL W P-5'-P-CCNC: 16 U/L (ref 10–35)
ANION GAP SERPL CALCULATED.3IONS-SCNC: 11 MMOL/L (ref 7–15)
AST SERPL W P-5'-P-CCNC: 18 U/L (ref 10–35)
BILIRUB SERPL-MCNC: 0.4 MG/DL
BUN SERPL-MCNC: 11.8 MG/DL (ref 8–23)
CALCIUM SERPL-MCNC: 8.9 MG/DL (ref 8.8–10.2)
CHLORIDE SERPL-SCNC: 106 MMOL/L (ref 98–107)
CHOLEST SERPL-MCNC: 82 MG/DL
CREAT SERPL-MCNC: 0.76 MG/DL (ref 0.51–0.95)
DEPRECATED HCO3 PLAS-SCNC: 22 MMOL/L (ref 22–29)
ERYTHROCYTE [DISTWIDTH] IN BLOOD BY AUTOMATED COUNT: 15.9 % (ref 10–15)
GFR SERPL CREATININE-BSD FRML MDRD: 78 ML/MIN/1.73M2
GLUCOSE SERPL-MCNC: 167 MG/DL (ref 70–99)
HBA1C MFR BLD: 7.3 %
HCT VFR BLD AUTO: 34.5 % (ref 35–47)
HDLC SERPL-MCNC: 42 MG/DL
HGB BLD-MCNC: 11.1 G/DL (ref 11.7–15.7)
LDLC SERPL CALC-MCNC: 22 MG/DL
MAGNESIUM SERPL-MCNC: 1.8 MG/DL (ref 1.7–2.3)
MCH RBC QN AUTO: 28.3 PG (ref 26.5–33)
MCHC RBC AUTO-ENTMCNC: 32.2 G/DL (ref 31.5–36.5)
MCV RBC AUTO: 88 FL (ref 78–100)
NONHDLC SERPL-MCNC: 40 MG/DL
PHOSPHATE SERPL-MCNC: 3.2 MG/DL (ref 2.5–4.5)
PLATELET # BLD AUTO: 214 10E3/UL (ref 150–450)
POTASSIUM SERPL-SCNC: 3.8 MMOL/L (ref 3.4–5.3)
PROT SERPL-MCNC: 6.5 G/DL (ref 6.4–8.3)
PTH-INTACT SERPL-MCNC: 42 PG/ML (ref 15–65)
RBC # BLD AUTO: 3.92 10E6/UL (ref 3.8–5.2)
SODIUM SERPL-SCNC: 139 MMOL/L (ref 136–145)
TRIGL SERPL-MCNC: 90 MG/DL
TSH SERPL DL<=0.005 MIU/L-ACNC: 2.14 UIU/ML (ref 0.3–4.2)
WBC # BLD AUTO: 5.7 10E3/UL (ref 4–11)

## 2023-03-14 PROCEDURE — 80053 COMPREHEN METABOLIC PANEL: CPT | Performed by: PATHOLOGY

## 2023-03-14 PROCEDURE — 83970 ASSAY OF PARATHORMONE: CPT | Performed by: PATHOLOGY

## 2023-03-14 PROCEDURE — 80061 LIPID PANEL: CPT | Performed by: PATHOLOGY

## 2023-03-14 PROCEDURE — 83735 ASSAY OF MAGNESIUM: CPT | Performed by: PATHOLOGY

## 2023-03-14 PROCEDURE — 84443 ASSAY THYROID STIM HORMONE: CPT | Performed by: PATHOLOGY

## 2023-03-14 PROCEDURE — 36415 COLL VENOUS BLD VENIPUNCTURE: CPT | Performed by: PATHOLOGY

## 2023-03-14 PROCEDURE — 84100 ASSAY OF PHOSPHORUS: CPT | Performed by: PATHOLOGY

## 2023-03-14 PROCEDURE — 82306 VITAMIN D 25 HYDROXY: CPT | Performed by: FAMILY MEDICINE

## 2023-03-14 PROCEDURE — 85027 COMPLETE CBC AUTOMATED: CPT | Performed by: PATHOLOGY

## 2023-03-14 PROCEDURE — 83036 HEMOGLOBIN GLYCOSYLATED A1C: CPT | Performed by: FAMILY MEDICINE

## 2023-03-16 ENCOUNTER — VIRTUAL VISIT (OUTPATIENT)
Dept: PHARMACY | Facility: CLINIC | Age: 83
End: 2023-03-16
Attending: HOSPITALIST
Payer: COMMERCIAL

## 2023-03-16 DIAGNOSIS — I50.32 CHRONIC HEART FAILURE WITH PRESERVED EJECTION FRACTION (H): Primary | ICD-10-CM

## 2023-03-16 DIAGNOSIS — M10.9 GOUT, UNSPECIFIED CAUSE, UNSPECIFIED CHRONICITY, UNSPECIFIED SITE: ICD-10-CM

## 2023-03-16 DIAGNOSIS — K21.9 GASTROESOPHAGEAL REFLUX DISEASE WITHOUT ESOPHAGITIS: ICD-10-CM

## 2023-03-16 DIAGNOSIS — I10 ESSENTIAL HYPERTENSION WITH GOAL BLOOD PRESSURE LESS THAN 140/90: ICD-10-CM

## 2023-03-16 DIAGNOSIS — I48.0 PAROXYSMAL ATRIAL FIBRILLATION (H): ICD-10-CM

## 2023-03-16 DIAGNOSIS — D50.9 IRON DEFICIENCY ANEMIA, UNSPECIFIED IRON DEFICIENCY ANEMIA TYPE: ICD-10-CM

## 2023-03-16 DIAGNOSIS — K52.9 GASTROENTERITIS: ICD-10-CM

## 2023-03-16 DIAGNOSIS — B00.1 COLD SORE: ICD-10-CM

## 2023-03-16 DIAGNOSIS — J84.9 ILD (INTERSTITIAL LUNG DISEASE) (H): ICD-10-CM

## 2023-03-16 DIAGNOSIS — M35.02 SJOGREN'S SYNDROME WITH LUNG INVOLVEMENT (H): ICD-10-CM

## 2023-03-16 DIAGNOSIS — Z79.4 TYPE 2 DIABETES MELLITUS WITH HYPERGLYCEMIA, WITH LONG-TERM CURRENT USE OF INSULIN (H): ICD-10-CM

## 2023-03-16 DIAGNOSIS — F39 MOOD DISORDER (H): ICD-10-CM

## 2023-03-16 DIAGNOSIS — N95.2 ATROPHIC VAGINITIS: ICD-10-CM

## 2023-03-16 DIAGNOSIS — M81.0 OSTEOPOROSIS, UNSPECIFIED OSTEOPOROSIS TYPE, UNSPECIFIED PATHOLOGICAL FRACTURE PRESENCE: ICD-10-CM

## 2023-03-16 DIAGNOSIS — M19.90 INFLAMMATORY ARTHRITIS: ICD-10-CM

## 2023-03-16 DIAGNOSIS — E11.65 TYPE 2 DIABETES MELLITUS WITH HYPERGLYCEMIA, WITH LONG-TERM CURRENT USE OF INSULIN (H): ICD-10-CM

## 2023-03-16 LAB — DEPRECATED CALCIDIOL+CALCIFEROL SERPL-MC: 44 UG/L (ref 20–75)

## 2023-03-16 PROCEDURE — 99207 PR NO CHARGE LOS: CPT | Performed by: PHARMACIST

## 2023-03-16 NOTE — PATIENT INSTRUCTIONS
"Recommendations from today's MTM visit:                                                    MTM (medication therapy management) is a service provided by a clinical pharmacist designed to help you get the most of out of your medicines.   Today we reviewed what your medicines are for, how to know if they are working, that your medicines are safe and how to make your medicine regimen as easy as possible.      1. Your labs came back showing your kidney function has improved. Restart your torsemide 40 mg by mouth every morning/30 mg by mouth every afternoon and your potassium 40 mEq by mouth every morning/20 mEq every afternoon.  Continue to monitor your weights and swelling before you see Dr. Tristan next week.     2. Continue to monitor your blood sugars and bring your readings to your follow-up appointment.     Follow-up: As Needed    It was great speaking with you today.  I value your experience and would be very thankful for your time in providing feedback in our clinic survey. In the next few days, you may receive an email or text message from [employer] with a link to a survey related to your  clinical pharmacist.\"     To schedule another MTM appointment, please call the clinic directly or you may call the MTM scheduling line at [phone] or toll-free at [phone].     My Clinical Pharmacist's contact information:                                                      Please feel free to contact me with any questions or concerns you have.      Guero Barakat, PharmD, [facility]  Medication Therapy Management Pharmacist  Pager: [phone]  "

## 2023-03-16 NOTE — Clinical Note
FYI - patient was experiencing >5 lb weight gain and ongoing swelling. Inpatient team told patient to restart morning dose if weight gain, but swelling was persisting. Because labs were looking improved/stable I had patient restart her prior to admission torsemide dose and potassium, but she remains off spironolactone.  Guero Barakat, PharmD, [facility] Medication Therapy Management Pharmacist Pager: [phone]

## 2023-03-16 NOTE — PROGRESS NOTES
Medication Therapy Management (MTM) Encounter    ASSESSMENT:                            Medication Adherence/Access: No issues identified    HFpEF/AFib/Hypertension: Somewhat stable. Weight gain exceeding 5 lbs in a week and ongoing swelling despite restarting morning dose of loop diuretic. Given improved/stable labs would be reasonable to restart afternoon dose of loop diuretic.     Type 2 Diabetes:  Somewhat stable. Home readings have consistently been below 150 mg/dL off basal insulin. Patient would benefit from continuing to monitor and following up in primary care as planned.     Sjogren's Syndrome/Interstitial lung disease/Rheumatoid arthritis/Gout: Stable per patient. Follow-up in place with rheumatology.     Depression:  Stable per patient.    GERD/History of GI Bleed/Anemia: Stable.    Atrophic vaginitis: Stable per patient.     Osteoporosis: Stable.    Cold Sores: Stable per patient.     PLAN:                            1. Your labs came back showing your kidney function has improved. Restart your torsemide 40 mg by mouth every morning/30 mg by mouth every afternoon and your potassium 40 mEq by mouth every morning/20 mEq every afternoon.  Continue to monitor your weights and swelling before you see Dr. Tristan next week.     2. Continue to monitor your blood sugars and bring your readings to your follow-up appointment.     Follow-up: As Needed    SUBJECTIVE/OBJECTIVE:                          Betty Tee is a 82 year old female called for a transitions of care visit. She was discharged from [facility] on 3/9/2023 for gastroenteritis.      Reason for visit: Hospital Follow-up.    Allergies/ADRs: Reviewed in chart  Past Medical History: Reviewed in chart  Tobacco: She reports that she quit smoking about 11 years ago. Her smoking use included cigarettes. She has a 5.00 pack-year smoking history. She has never used smokeless tobacco.  Alcohol: not currently using    Medication Adherence/Access: she gets help with  managing medications from friend Nghia    HFpEF/AFib/Hypertension: Current medications include aspirin 81 mg daily, clopidogrel 75 mg daily, metoprolol XL 50 mg twice daily. She restarted torsemide 40 mg by mouth every morning due to increase in weight (states she was told to do this at discharge - now 3 days) - ongoing noticeable swelling issues/weights not decreased back to baseline - wonders based upon her labs if she can restart previous dose. Also has been holding spironolactone and potassium chloride. Patient reports no current medication side effects.     ECHO:  Date 9/26/2022, EF 55 to 60%  Patient is complaining of HF symptoms of: increased leg swelling.    Patient is measuring daily weights  and reports increased.   Patient does self-monitor blood pressure.  Patient is following a low sodium diet, is avoiding EtOH.  BP Readings from Last 3 Encounters:   03/09/23 (!) 145/57   01/13/23 130/63   12/16/22 138/77     Wt Readings from Last 5 Encounters:   03/08/23 179 lb 6.4 oz (81.4 kg)   01/13/23 178 lb (80.7 kg)   11/10/22 182 lb (82.6 kg)   10/06/22 190 lb (86.2 kg)   10/05/22 190 lb 11.2 oz (86.5 kg)     Potassium   Date Value Ref Range Status   03/14/2023 3.8 3.4 - 5.3 mmol/L Final   08/23/2022 4.1 3.4 - 5.3 mmol/L Final   06/04/2021 4.0 3.4 - 5.3 mmol/L Final     Estimated Creatinine Clearance: 60.2 mL/min (based on SCr of 0.76 mg/dL).    Recent Labs   Lab Test 03/14/23  1657 12/09/21  1033   CHOL 82 104   HDL 42* 59   LDL 22 16   TRIG 90 145     Type 2 Diabetes:  Currently taking Ozempic 1 mg weekly and Humalog 1 unit per 50 mg/dL above 150 mg/dL. Patient is not experiencing side effects.  Blood sugar monitoring: 3 time(s) daily. Ranges (patient reported): less than 150 mg/dL except for this morning (had small powdered sugar donut before bedtime)  Symptoms of low blood sugar? none  Symptoms of high blood sugar? none  Eye exam: up to date  Foot exam: due  Diet/Exercise: getting back to normal diet -  activity somewhat limited - hip replacement scheduled in May  Aspirin: Taking 81mg daily and denies side effects   Statin: Yes: atorvatatin   ACEi/ARB: No.   Urine Albumin:   Lab Results   Component Value Date    UMALCR 20.48 11/28/2022      Lab Results   Component Value Date    A1C 7.3 03/14/2023    A1C 7.3 03/09/2023    A1C 5.5 11/28/2022    A1C 6.4 08/23/2022    A1C 5.9 05/25/2022    A1C 7.1 06/04/2021    A1C 8.0 03/03/2021    A1C 8.7 10/20/2020    A1C 6.8 06/19/2020    A1C 7.0 01/21/2020       Sjogren's Syndrome/Interstitial lung disease/Rheumatoid arthritis/Gout: Currently taking allopurinol 100 mg daily, azithromycin 250 mg daily,  hydroxychloroquine 400 mg daily, loratadine 10 mg daily, lidocaine 5% patch daily as needed, montelukast 10 mg daily, Breo Ellipta 100-25 mcg - one puff daily, albuterol HFA every 6 hours as needed (has not needed), prednisone 5 mg daily, acetaminophen 1,000 mg three times daily as needed,  methocarbamol 750 mg three times daily as needed (rare use), and oxycodone-acetaminophen 7.5-325 mg twice daily as needed. Was set up with Kineret injection as needed for gout flare ups but has not needed. Patient reports no current pain concerns. Patient is experiencing the following medication side effects: none.   Uric Acid   Date Value Ref Range Status   01/13/2023 6.2 (H) 2.4 - 5.7 mg/dL Final   06/04/2021 6.2 (H) 2.6 - 6.0 mg/dL Final   ]    Depression:  Current medications include: escitalopram 20 mg daily and trazodone 100 mg daily as needed. Patient reports that depression symptoms are stable. Sleeping okay. Denies any side effects.  PHQ-9 SCORE 2/23/2022 5/24/2022 8/23/2022   PHQ-9 Total Score - - -   PHQ-9 Total Score MyChart 7 (Mild depression) 8 (Mild depression) 5 (Mild depression)   PHQ-9 Total Score 7 8 5       GERD/History of GI Bleed/Anemia: Current medications include: Protonix (pantoprazole) 40 mg once daily. Did restart ferrous gluconate daily and vitamin B12 1,000 mcg three  times weekly. Patient reports no current symptoms.  Patient feels that current regimen is effective.    Atrophic vaginitis: Patient is using Estriol 1 mg/g twice weekly. Has not had urinary tract infection recently. Denies any current issues.     Osteoporosis: Current therapy includes: alendronate 70 mg weekly, vitamin D3 50 mcg daily. Patient is not experiencing side effects.  DEXA History: 8/20/19  Risk factors: post-menopausal  chronic corticosteroid use  chronic PPI use  Last vitamin D level:  Lab Results   Component Value Date    VITDT 44 03/14/2023    VITDT 59 11/28/2022    VITDT 56 08/05/2022    VITDT 38 05/16/2022    VITDT 38 04/22/2022       Cold Sores: Patient does have acyclovir 400 mg three times daily as needed and acyclovir 5% ointment available as needed. Has not needed recently. Denies any issues.     Today's Vitals: There were no vitals taken for this visit.  ----------------  Post Discharge Medication Reconciliation Status: discharge medications reconciled and changed, per note/orders.    I spent 35 minutes with this patient today. All changes were made via collaborative practice agreement with Ilene Tristan MD. A copy of the visit note was provided to the patient's provider(s).    A summary of these recommendations was sent via [employer].    Guero Barakat, PharmD, [facility]  Medication Therapy Management Pharmacist  Pager: [phone]      Telemedicine Visit Details  Type of service:  Telephone visit  Start Time: 9:30 AM  End Time: 10:05 AM     Medication Therapy Recommendations  (HFpEF) heart failure with preserved ejection fraction (H)    Current Medication: torsemide (DEMADEX) 20 MG tablet   Rationale: Dose too low - Dosage too low - Effectiveness   Recommendation: Increase Dose - torsemide 10 MG tablet - Increase torsemide to 40 mEq by mouth every morning and 30 mEq by mouth every afternoon   Status: Accepted per CPA

## 2023-03-20 ENCOUNTER — OFFICE VISIT (OUTPATIENT)
Dept: PALLIATIVE MEDICINE | Facility: CLINIC | Age: 83
End: 2023-03-20
Payer: MEDICARE

## 2023-03-20 VITALS — OXYGEN SATURATION: 97 % | SYSTOLIC BLOOD PRESSURE: 139 MMHG | HEART RATE: 58 BPM | DIASTOLIC BLOOD PRESSURE: 57 MMHG

## 2023-03-20 DIAGNOSIS — M16.11 OSTEOARTHRITIS OF RIGHT HIP, UNSPECIFIED OSTEOARTHRITIS TYPE: Primary | ICD-10-CM

## 2023-03-20 DIAGNOSIS — F11.90 CHRONIC, CONTINUOUS USE OF OPIOIDS: ICD-10-CM

## 2023-03-20 DIAGNOSIS — M79.18 MYOFASCIAL PAIN: ICD-10-CM

## 2023-03-20 PROCEDURE — 99214 OFFICE O/P EST MOD 30 MIN: CPT | Performed by: PHYSICAL MEDICINE & REHABILITATION

## 2023-03-20 ASSESSMENT — PAIN SCALES - GENERAL: PAINLEVEL: MILD PAIN (3)

## 2023-03-20 NOTE — PROGRESS NOTES
[facility] Follow Up     Date of visit: 3/20/2023    Assessment:  Sister Betty Buckner is a 82 year old medically complex patient with past medical history including: Atrial fibrillation, VLH, History of DVT, CHF, HLD, HTN, DM 2, Stage 3 CKD, RLS, Depression, History of etoh abuse (remission 30+ years) with the following chronic pain conditions:     1. Right sided hip and leg pain: Betty reports a long standing history of right hip and leg pain. She has been having worsening right lateral hip and groin pain. She also has a long standing history of pain that starts in her right low back/upper buttock and radiates laterally and posteriorly down the right leg to the foot. Initially was getting improvement with piriformis injection but declining benefit over time. Significant but short term improvement noted with hip injections. Appears that hip pain is likely due to a combination of right sided gluteal weakness/dysfunction and severe right hip OA.    Plan:  1. Therapies:  On hold for now  2. Clinical Health Pain Psychologist: Not at this time  3. Diagnostic Studies: None  4. Medication Management:   1. Continue Percocet 7.5-325 mg BID prn. She reports minimal pain relief but does not want to make changes at this time. Can consider increase to 10 mg BID prn if pain becomes too severe.   2. Continue methocarbamol 750 mg TID prn       5. Further procedures recommended: None. No long term benefit with hip injections. Is planning on having a hip replacement in the near future.   6. Agree with using a wheelchair when needing to ambulate longer distances since walking is the main aggravating factor for her pain  7. Follow up: 3 months      Chief complaint:   Chief Complaint   Patient presents with     Pain     Since her last visit, Betty Watkinsey reports:  - using a wheelchair for longer distances.  Walker for shorter periods of time  - not using pain medication. Last used maybe around 3  weeks ago.   -  Surgery scheduled for 5/31.     Pain scores:  Pain intensity currently is 3 on a scale of 0-10.     Medications:       Current pain medications:  -Tylenol 1000mg q8h prn - Usually takes 1-2 times a day  -Escitalopram 20mg daily              -percocet 7.5-325mg BID prn - takes edge off the pain              -methocarbamol 750mg tid prn              -Voltaren 1% gel qid prn    Current calculated MME: 22         Previous pain medications:  -Tramadol 50mg prn  -Tizanidine, flexeril - nh              -Gabapentin 300mg TID - memory difficulty       Past pain treatments:  Physical Therapy: hasn't completed PT for low back or hip pain                TENS unit: hasn't tried  Pain psychology: hasn't tried  Surgery: Lumbar spine surgery in the 1960s.   Injections:   - right intra-articular hip injections on 7/22/22 and 3/9/22 - H short term   -Right piriformis injection on 4/1/21 with 60% relief for 8 weeks. Repeated in June with approx 50% relief.  -Two epidural injections (right L5 tfesi), feb 2020 was helpful the next day but she had a fall right after, aug 2020 procedure - not helpful, had worsening pain  Alternative Therapies:               Chiropractic: hasn't tried               Acupuncture:  Yes - [facility]    Medications:  Current Outpatient Medications   Medication Sig Dispense Refill     ACCU-CHEK SHANNON PLUS test strip USE TO TEST BLOOD SUGARS 3 TIMES DAILY 300 strip 5     acetaminophen (TYLENOL) 500 MG tablet Take 1,000 mg by mouth every 8 hours as needed (max 6 tablets/24 hours, 2 tablets/dose)       acyclovir (ZOVIRAX) 400 MG tablet Take 1 tablet (400 mg) by mouth 3 times daily as needed For a couple days 15 tablet 2     acyclovir (ZOVIRAX) 5 % external ointment Apply topically as needed (6 times day PRN for outbreaks) As needed for outbreaks 15 g 3     albuterol (PROAIR HFA/PROVENTIL HFA/VENTOLIN HFA) 108 (90 Base) MCG/ACT inhaler Inhale 2 puffs into the lungs every 6 hours 18 g 11     alendronate  (FOSAMAX) 70 MG tablet Take 1 tablet (70 mg) by mouth every 7 days 4 tablet 1     allopurinol (ZYLOPRIM) 100 MG tablet Take 1 tablet (100 mg) by mouth daily 90 tablet 1     anakinra (KINERET) 100 MG/0.67ML SOSY injection 100 mg every day x 3 days as needed for flare ups 6.7 mL 3     aspirin (ASA) 81 MG EC tablet Take 1 tablet (81 mg) by mouth daily 90 tablet 1     atorvastatin (LIPITOR) 10 MG tablet TAKE 1/2 TABLET BY MOUTH EVERY DAY 45 tablet 0     azithromycin (ZITHROMAX) 250 MG tablet TAKE 1 TABLET BY MOUTH EVERY DAY 30 tablet 5     BD PEN NEEDLE JANE 2ND GEN 32G X 4 MM miscellaneous USE TO TEST 4 TIMES A  each 1     blood glucose (NO BRAND SPECIFIED) lancets standard Use to test blood sugar 3 times daily or as directed 100 lancet 3     cevimeline (EVOXAC) 30 MG capsule Take 1 capsule (30 mg) by mouth 3 times daily (Patient taking differently: Take 30 mg by mouth 3 times daily as needed) 90 capsule 11     Cholecalciferol (VITAMIN D3) 50 MCG (2000 UT) CAPS TAKE 100 MCG BY MOUTH DAILY (TAKE 2 TABLET (50 MCG) BY MOUTH DAILY - ORAL) 180 capsule 0     clopidogrel (PLAVIX) 75 MG tablet Take 1 tablet (75 mg) by mouth daily 90 tablet 1     COMPOUNDED NON-CONTROLLED SUBSTANCE (CMPD RX) - PHARMACY TO MIX COMPOUNDED MEDICATION Estriol 1 mg/g Apply small amount to finger and apply to inside vagina daily for 2 weeks then twice weekly Route: vaginally Dispense 30 grams 11 refills 30 g 11     cyanocobalamin (VITAMIN B-12) 1000 MCG tablet Take 1 tablet (1,000 mcg) by mouth daily (Patient taking differently: Take 1,000 mcg by mouth three times a week) 30 tablet 1     escitalopram (LEXAPRO) 20 MG tablet TAKE 1 TABLET BY MOUTH EVERY DAY 90 tablet 0     ferrous gluconate (FERGON) 324 (38 Fe) MG tablet Take 1 tablet (324 mg) by mouth daily (with breakfast) 90 tablet 1     fluticasone (FLONASE) 50 MCG/ACT nasal spray SPRAY 1-2 SPRAYS INTO BOTH NOSTRILS DAILY AS NEEDED FOR ALLERGIES 16 mL 11     fluticasone-vilanterol (BREO  ELLIPTA) 100-25 MCG/INH inhaler Inhale 1 puff into the lungs daily 1 each 11     hydroxychloroquine (PLAQUENIL) 200 MG tablet Take 2 tablets (400 mg) by mouth daily Get annual eye exams for hydroxychloroquine (Plaquenil) monitoring and fax to [phone] 180 tablet 3     insulin glargine (BASAGLAR KWIKPEN) 100 UNIT/ML pen 3INJECT 24 UNITS SUBCUTANEOUS DAILY (TO REPLACE LANTUS) (Patient not taking: Reported on 3/16/2023) 15 mL 1     insulin lispro (HUMALOG KWIKPEN) 100 UNIT/ML (1 unit dial) KWIKPEN Inject 8 Units Subcutaneous 3 times daily (before meals)       ketoconazole (NIZORAL) 2 % external cream Apply topically 2 times daily as needed for itching 60 g 1     KLOR-CON 20 MEQ CR tablet TAKE 2 TABLETS (40 MEQ) BY MOUTH EVERY MORNING AND 1 TABLET (20 MEQ) EVERY EVENING. 270 tablet 3     lidocaine (LIDODERM) 5 % patch APPLY PATCH TO PAINFUL AREA FOR UP TO 12 H WITHIN A 24 H PERIOD. REMOVE AFTER 12 HOURS. (Patient taking differently: Apply patch to painful area for up to 12 h within a 24 h period.  Remove after 12 hours.) 30 patch 2     loratadine (CLARITIN) 10 MG tablet TAKE 1 TABLET BY MOUTH EVERY DAY 90 tablet 3     methocarbamol (ROBAXIN) 750 MG tablet Take 1 tablet (750 mg) by mouth 3 times daily as needed for muscle spasms 90 tablet 3     metoprolol succinate ER (TOPROL XL) 50 MG 24 hr tablet Take 1 tablet (50 mg) by mouth 2 times daily 90 tablet 3     montelukast (SINGULAIR) 10 MG tablet TAKE 1 TABLET BY MOUTH EVERYDAY AT BEDTIME 90 tablet 0     oxyCODONE-acetaminophen (PERCOCET) 7.5-325 MG per tablet Take 1 tablet by mouth 2 times daily as needed for severe pain (7-10) With at least 4 hours between doses. Fill and start 2/17/23 60 tablet 0     OZEMPIC, 1 MG/DOSE, 4 MG/3ML pen INJECT 1 MG SUBCUTANEOUS ONCE A WEEK 3 mL 1     pantoprazole (PROTONIX) 40 MG EC tablet Take 1 tablet (40 mg) by mouth daily (replaces famotidine- should stop taking famotidine) 90 tablet 3     predniSONE (DELTASONE) 5 MG tablet Take 1  tablet (5 mg) by mouth daily 90 tablet 1     spironolactone (ALDACTONE) 25 MG tablet Take 2 tablets (50 mg) by mouth daily (Patient not taking: Reported on 3/16/2023) 180 tablet 3     torsemide (DEMADEX) 10 MG tablet TAKE ONE TAB (10 MG) WITH ONE 20 MG TAB TO = 30 MG DAILY IN AFTERNOON. 90 tablet 3     torsemide (DEMADEX) 20 MG tablet TAKE 2 TABLETS (40 MG) BY MOUTH EVERY MORNING AND 1 TABLET (20 MG) DAILY AT 2 PM. TAKE THE AFTERNOON DOSE WITH AN EXTRA 10 MG TAB FOR A TOTAL OF 30 MG IN THE AFTERNOON 270 tablet 3     traZODone (DESYREL) 50 MG tablet TAKE 3 TABLETS (150 MG) BY MOUTH NIGHTLY AS NEEDED FOR SLEEP 270 tablet 0     Diagnostic tests:  X-ray of right hip on 12/9/21 shows:  FINDINGS: AP and frog-leg lateral right hip views were obtained.      No acute osseous abnormality. Severe degenerative changes of the right  hip, progressed compared to prior. Vascular ossifications. Lumbar  spondylosis. Right SI joint degenerative change.        CSA and UDS due - 9/2023     Physical Exam:  Blood pressure 139/57, pulse 58, SpO2 97 %, not currently breastfeeding.  General: A&O x 3, in NAD  Gait: Not evaluated today  Pulm:   Breathing unlabored on room air      Alanna Alba MD  [facility]     BILLING TIME DOCUMENTATION:   The total TIME spent on this patient on the date of the encounter/appointment was 10 minutes.      TOTAL TIME includes:   Time spent preparing to see the patient (reviewing records and tests)  Time spent face to face (or over the phone) with the patient  Time spent ordering tests, medications, procedures and referrals  Time spent documenting clinical information in Epic

## 2023-03-20 NOTE — PATIENT INSTRUCTIONS
----------------------------------------------------------------  [facility] Number:  [phone]   Call with any questions about your care and for scheduling assistance.   Calls are returned Monday through Friday between 8 AM and 4:30 PM. We usually get back to you within 2 business days depending on the issue/request.    If we are prescribing your medications:  For opioid medication refills, call the clinic or send a ITN message 7 days in advance.  Please include:  Name of requested medication  Name of the pharmacy.  For non-opioid medications, call your pharmacy directly to request a refill. Please allow 3-4 days to be processed.   Per [place] State Law:  All controlled substance prescriptions must be filled within 30 days of being written.    For those controlled substances allowing refills, pickup must occur within 30 days of last fill.      We believe regular attendance is key to your success in our program!    Any time you are unable to keep your appointment we ask that you call us at least 24 hours in advance to cancel.This will allow us to offer the appointment time to another patient.   Multiple missed appointments may lead to dismissal from the clinic.

## 2023-03-21 ENCOUNTER — OFFICE VISIT (OUTPATIENT)
Dept: FAMILY MEDICINE | Facility: CLINIC | Age: 83
End: 2023-03-21
Payer: MEDICARE

## 2023-03-21 VITALS
RESPIRATION RATE: 16 BRPM | SYSTOLIC BLOOD PRESSURE: 116 MMHG | WEIGHT: 171.1 LBS | TEMPERATURE: 98.6 F | BODY MASS INDEX: 28.47 KG/M2 | DIASTOLIC BLOOD PRESSURE: 72 MMHG | OXYGEN SATURATION: 98 % | HEART RATE: 59 BPM

## 2023-03-21 DIAGNOSIS — M79.644 PAIN IN FINGER OF BOTH HANDS: ICD-10-CM

## 2023-03-21 DIAGNOSIS — M79.675 PAIN IN TOES OF BOTH FEET: ICD-10-CM

## 2023-03-21 DIAGNOSIS — D50.9 IRON DEFICIENCY ANEMIA, UNSPECIFIED IRON DEFICIENCY ANEMIA TYPE: ICD-10-CM

## 2023-03-21 DIAGNOSIS — M79.645 PAIN IN FINGER OF BOTH HANDS: ICD-10-CM

## 2023-03-21 DIAGNOSIS — N18.32 STAGE 3B CHRONIC KIDNEY DISEASE (H): ICD-10-CM

## 2023-03-21 DIAGNOSIS — I50.32 CHRONIC HEART FAILURE WITH PRESERVED EJECTION FRACTION (H): ICD-10-CM

## 2023-03-21 DIAGNOSIS — Z79.4 TYPE 2 DIABETES MELLITUS WITH HYPERGLYCEMIA, WITH LONG-TERM CURRENT USE OF INSULIN (H): ICD-10-CM

## 2023-03-21 DIAGNOSIS — E11.65 TYPE 2 DIABETES MELLITUS WITH HYPERGLYCEMIA, WITH LONG-TERM CURRENT USE OF INSULIN (H): ICD-10-CM

## 2023-03-21 DIAGNOSIS — N17.9 AKI (ACUTE KIDNEY INJURY) (H): ICD-10-CM

## 2023-03-21 DIAGNOSIS — A08.11 ACUTE GASTROENTEROPATHY DUE TO NOROVIRUS: Primary | ICD-10-CM

## 2023-03-21 DIAGNOSIS — R20.2 PARESTHESIA: ICD-10-CM

## 2023-03-21 DIAGNOSIS — I48.19 PERSISTENT ATRIAL FIBRILLATION (H): ICD-10-CM

## 2023-03-21 DIAGNOSIS — M79.674 PAIN IN TOES OF BOTH FEET: ICD-10-CM

## 2023-03-21 DIAGNOSIS — I10 ESSENTIAL HYPERTENSION WITH GOAL BLOOD PRESSURE LESS THAN 140/90: ICD-10-CM

## 2023-03-21 DIAGNOSIS — K31.819 GAVE (GASTRIC ANTRAL VASCULAR ECTASIA): ICD-10-CM

## 2023-03-21 LAB
ERYTHROCYTE [DISTWIDTH] IN BLOOD BY AUTOMATED COUNT: 16.1 % (ref 10–15)
HCT VFR BLD AUTO: 40.1 % (ref 35–47)
HGB BLD-MCNC: 13 G/DL (ref 11.7–15.7)
MCH RBC QN AUTO: 28.8 PG (ref 26.5–33)
MCHC RBC AUTO-ENTMCNC: 32.4 G/DL (ref 31.5–36.5)
MCV RBC AUTO: 89 FL (ref 78–100)
PLATELET # BLD AUTO: 286 10E3/UL (ref 150–450)
RBC # BLD AUTO: 4.51 10E6/UL (ref 3.8–5.2)
WBC # BLD AUTO: 6.1 10E3/UL (ref 4–11)

## 2023-03-21 PROCEDURE — 36415 COLL VENOUS BLD VENIPUNCTURE: CPT | Performed by: FAMILY MEDICINE

## 2023-03-21 PROCEDURE — G0008 ADMIN INFLUENZA VIRUS VAC: HCPCS | Performed by: FAMILY MEDICINE

## 2023-03-21 PROCEDURE — 82607 VITAMIN B-12: CPT | Performed by: FAMILY MEDICINE

## 2023-03-21 PROCEDURE — 90662 IIV NO PRSV INCREASED AG IM: CPT | Performed by: FAMILY MEDICINE

## 2023-03-21 PROCEDURE — 99214 OFFICE O/P EST MOD 30 MIN: CPT | Mod: 25 | Performed by: FAMILY MEDICINE

## 2023-03-21 PROCEDURE — 85027 COMPLETE CBC AUTOMATED: CPT | Performed by: FAMILY MEDICINE

## 2023-03-21 PROCEDURE — 80053 COMPREHEN METABOLIC PANEL: CPT | Performed by: FAMILY MEDICINE

## 2023-03-21 ASSESSMENT — ANXIETY QUESTIONNAIRES
GAD7 TOTAL SCORE: 6
6. BECOMING EASILY ANNOYED OR IRRITABLE: SEVERAL DAYS
IF YOU CHECKED OFF ANY PROBLEMS ON THIS QUESTIONNAIRE, HOW DIFFICULT HAVE THESE PROBLEMS MADE IT FOR YOU TO DO YOUR WORK, TAKE CARE OF THINGS AT HOME, OR GET ALONG WITH OTHER PEOPLE: SOMEWHAT DIFFICULT
2. NOT BEING ABLE TO STOP OR CONTROL WORRYING: SEVERAL DAYS
8. IF YOU CHECKED OFF ANY PROBLEMS, HOW DIFFICULT HAVE THESE MADE IT FOR YOU TO DO YOUR WORK, TAKE CARE OF THINGS AT HOME, OR GET ALONG WITH OTHER PEOPLE?: SOMEWHAT DIFFICULT
3. WORRYING TOO MUCH ABOUT DIFFERENT THINGS: SEVERAL DAYS
7. FEELING AFRAID AS IF SOMETHING AWFUL MIGHT HAPPEN: NOT AT ALL
5. BEING SO RESTLESS THAT IT IS HARD TO SIT STILL: SEVERAL DAYS
1. FEELING NERVOUS, ANXIOUS, OR ON EDGE: SEVERAL DAYS
GAD7 TOTAL SCORE: 6
7. FEELING AFRAID AS IF SOMETHING AWFUL MIGHT HAPPEN: NOT AT ALL
4. TROUBLE RELAXING: SEVERAL DAYS
GAD7 TOTAL SCORE: 6

## 2023-03-21 ASSESSMENT — ENCOUNTER SYMPTOMS
FREQUENCY: 1
ARTHRALGIAS: 1
COUGH: 1

## 2023-03-21 ASSESSMENT — PATIENT HEALTH QUESTIONNAIRE - PHQ9
SUM OF ALL RESPONSES TO PHQ QUESTIONS 1-9: 8
SUM OF ALL RESPONSES TO PHQ QUESTIONS 1-9: 8

## 2023-03-21 ASSESSMENT — ACTIVITIES OF DAILY LIVING (ADL): CURRENT_FUNCTION: MEDICATION ADMINISTRATION REQUIRES ASSISTANCE

## 2023-03-21 ASSESSMENT — PAIN SCALES - GENERAL: PAINLEVEL: NO PAIN (0)

## 2023-03-21 NOTE — PATIENT INSTRUCTIONS
Patient Education   Personalized Prevention Plan  You are due for the preventive services outlined below.  Your care team is available to assist you in scheduling these services.  If you have already completed any of these items, please share that information with your care team to update in your medical record.  Health Maintenance Due   Topic Date Due     Heart Failure Action Plan  Never done     COPD Action Plan  Never done     Diabetic Foot Exam  10/22/2020     Osteoporosis Screening  08/20/2022     ANNUAL REVIEW OF HM ORDERS  09/14/2022     Eye Exam  04/15/2023     Your Health Risk Assessment indicates you feel you are not in good health    A healthy lifestyle helps keep the body fit and the mind alert. It helps protect you from disease, helps you fight disease, and helps prevent chronic disease (disease that doesn't go away) from getting worse. This is important as you get older and begin to notice twinges in muscles and joints and a decline in the strength and stamina you once took for granted. A healthy lifestyle includes good healthcare, good nutrition, weight control, recreation, and regular exercise. Avoid harmful substances and do what you can to keep safe. Another part of a healthy lifestyle is stay mentally active and socially involved.    Good healthcare     Have a wellness visit every year.     If you have new symptoms, let us know right away. Don't wait until the next checkup.     Take medicines exactly as prescribed and keep your medicines in a safe place. Tell us if your medicine causes problems.   Healthy diet and weight control     Eat 3 or 4 small, nutritious, low-fat, high-fiber meals a day. Include a variety of fruits, vegetables, and whole-grain foods.     Make sure you get enough calcium in your diet. Calcium, vitamin D, and exercise help prevent osteoporosis (bone thinning).     If you live alone, try eating with others when you can. That way you get a good meal and have company while you  eat it.     Try to keep a healthy weight. If you eat more calories than your body uses for energy, it will be stored as fat and you will gain weight.     Recreation   Recreation is not limited to sports and team events. It includes any activity that provides relaxation, interest, enjoyment, and exercise. Recreation provides an outlet for physical, mental, and social energy. It can give a sense of worth and achievement. It can help you stay healthy.    Mental Exercise and Social Involvement  Mental and emotional health is as important as physical health. Keep in touch with friends and family. Stay as active as possible. Continue to learn and challenge yourself.   Things you can do to stay mentally active are:    Learn something new, like a foreign language or musical instrument.     Play SCRABBLE or do crossword puzzles. If you cannot find people to play these games with you at home, you can play them with others on your computer through the Internet.     Join a games club--anything from card games to chess or checkers or lawn bowling.     Start a new hobby.     Go back to school.     Volunteer.     Read.   Keep up with world events.    Exercise for a Healthier Heart  You may wonder how you can improve the health of your heart. If you re thinking about exercise, you re on the right track. You don t need to become an athlete. But you do need a certain amount of brisk exercise to help strengthen your heart. If you have been diagnosed with a heart condition, your healthcare provider may advise exercise to help your condition. To help make exercise a habit, choose safe, fun activities.      Exercise with a friend. When activity is fun, you're more likely to stick with it.     Before you start  Check with your healthcare provider before starting an exercise program. This is especially important if you haven't been active for a while. It's also important if you have a long-term (chronic) health problem such as heart  disease, diabetes, or obesity. Also check with your provider if you're at high risk for having these problems.   Why exercise?  Exercising regularly offers many healthy rewards. It can help you do all of these:     Improve your blood cholesterol level to help prevent further heart trouble.    Lower your blood pressure to help prevent a stroke or heart attack.    Control diabetes or reduce your risk of getting this disease.    Improve your heart and lung function.    Reach and stay at a healthy weight.    Make your muscles stronger so you can stay active.    Prevent falls and fractures by slowing the loss of bone mass (osteoporosis).    Manage stress better.    Improve your sense of self and your body image.  Exercise tips      Ease into your routine. Set small goals. Then build on them. Talk with your healthcare provider first before starting an exercise routine if you're not sure what your activity level should be.    Exercise on most days. Aim for a total of at least 150 minutes (2 hours and 30 minutes) or more of moderate-intensity aerobic activity each week. You could also do 75 minutes (1 hour and 15 minutes) or more of vigorous-intensity aerobic activity each week. Or try for a combination of both. Moderate activity means that you breathe heavier and your heart rate increases, but you can still talk. Think about doing at least 30 minutes of moderate exercise, 5 times a week. It's OK to work up to the 30-minute period over time. Examples of moderate-intensity activity are brisk walking, gardening, and water aerobics.    Step up your daily activity level.  Along with your exercise program, try being more active the whole day. Walk instead of drive. Or park further away so that you take more steps each day. Do more household tasks or yard work. You may not be able to meet the advised amount of physical activity. But doing some moderate- or vigorous-intensity aerobic activity can help reduce your risk for heart  disease. Your healthcare provider can help you figure out what is best for you.    Choose 1 or more activities you enjoy.  Walking is one of the easiest things you can do. You can also try swimming, riding a bike, dancing, or taking an exercise class.    Call 911  Call 911 right away if any of these occur:     Chest pain that doesn't go away quickly with rest    New burning, tightness, pressure, or heaviness in your chest, neck, shoulders, back, or arms    Abnormal or severe shortness of breath    A very fast or irregular heartbeat (palpitations)    Fainting  When to call your healthcare provider  Call your healthcare provider if you have any of these:     Dizziness or lightheadedness    Mild shortness of breath or chest pain    Increased or new joint or muscle pain    [employer] last reviewed this educational content on 7/1/2022 2000-2022 [employer]. All rights reserved. This information is not intended as a substitute for professional medical care. Always follow your healthcare professional's instructions.          Understanding USDA MyPlate  The USDA has guidelines to help you make healthy food choices. These are called MyPlate. MyPlate shows the food groups that make up healthy meals using the image of a place setting. Before you eat, think about the healthiest choices for what to put on your plate or in your cup or bowl. To learn more about building a healthy plate, visit www.choosemyplate.gov.     The food groups    Fruits. Any fruit or 100% fruit juice counts as part of the Fruit Group. Fruits may be fresh, canned, frozen, or dried, and may be whole, cut-up, or pureed. Make 1/2 of your plate fruits and vegetables.    Vegetables. Any vegetable or 100% vegetable juice counts as a member of the Vegetable Group. Vegetables may be fresh, frozen, canned, or dried. They can be served raw or cooked and may be whole, cut-up, or mashed. Make 1/2 of your plate fruits and vegetables.    Grains. All foods  made from grains are part of the Grains Group. These include wheat, rice, oats, cornmeal, and barley. Grains are often used to make foods such as bread, pasta, oatmeal, cereal, tortillas, and grits. Grains should be no more than 1/4 of your plate. At least half of your grains should be whole grains.    Protein. This group includes meat, poultry, seafood, beans and peas, eggs, processed soy products (such as tofu), nuts (including nut butters), and seeds. Make protein choices no more than 1/4 of your plate. Meat and poultry choices should be lean or low fat.    Dairy. The Dairy Group includes all fluid milk products and foods made from milk that contain calcium, such as yogurt and cheese. (Foods that have little calcium, such as cream, butter, and cream cheese, are not part of this group.) Most dairy choices should be low-fat or fat-free.    Oils. Oils aren't a food group, but they do contain essential nutrients. However it's important to watch your intake of oils. These are fats that are liquid at room temperature. They include canola, corn, olive, soybean, vegetable, and sunflower oil. Foods that are mainly oil include mayonnaise, certain salad dressings, and soft margarines. You likely already get your daily oil allowance from the foods you eat.  Things to limit  Eating healthy also means limiting these things in your diet:    Salt (sodium). Many processed foods have a lot of sodium. To keep sodium intake down, eat fresh vegetables, meats, poultry, and seafood when possible. Purchase low-sodium, reduced-sodium, or no-salt-added food products at the store. And don't add salt to your meals at home. Instead, season them with herbs and spices such as dill, oregano, cumin, and paprika. Or try adding flavor with lemon or lime zest and juice.    Saturated fat. Saturated fats are most often found in animal products such as beef, pork, and chicken. They are often solid at room temperature, such as butter. To reduce your  saturated fat intake, choose leaner cuts of meat and poultry. And try healthier cooking methods such as grilling, broiling, roasting, or baking. For a simple lower-fat swap, use plain nonfat yogurt instead of mayonnaise when making potato salad or macaroni salad.    Added sugars. These are sugars added to foods. They are in foods such as ice cream, candy, soda, fruit drinks, sports drinks, energy drinks, cookies, pastries, jams, and syrups. Cut down on added sugars by sharing sweet treats with a family member or friend. You can also choose fruit for dessert, and drink water or other unsweetened beverages.  [employer] last reviewed this educational content on 6/1/2020 2000-2022 [employer]. All rights reserved. This information is not intended as a substitute for professional medical care. Always follow your healthcare professional's instructions.        Activities of Daily Living    Your Health Risk Assessment indicates you have difficulties with activities of daily living such as housework, bathing, preparing meals, taking medication, etc. Please make a follow up appointment for us to address this issue in more detail.    Signs of Hearing Loss  Hearing loss is a problem shared by many people. In fact, it's one of the most common health problems, particularly as people age. Most people aged 65 and older have some hearing loss. By age 80, almost everyone does. Hearing loss often occurs slowly over the years. So, you may not realize your hearing has gotten worse.   When sudden hearing loss occurs, it's important to contact your healthcare provider right away. Your provider will do a medical exam and a hearing exam as soon as possible. This is to help find the cause and type of your sudden hearing loss. Based on your diagnosis, your healthcare provider will discuss possible treatments.      Hearing much better with one ear can be a sign of hearing loss.     Have your hearing checked  Call your healthcare  provider if you:     Have to strain to hear normal conversation    Have to watch other people s faces very carefully to follow what they re saying    Need to ask people to repeat what they ve said    Often misunderstand what people are saying    Turn the volume of the television or radio up so high that others complain    Feel that people are mumbling when they re talking to you    Find that the effort to hear leaves you feeling tired and irritated    Notice, when using the phone, that you hear better with one ear than the other  [employer] last reviewed this educational content on 6/1/2022 2000-2022 [employer]. All rights reserved. This information is not intended as a substitute for professional medical care. Always follow your healthcare professional's instructions.          Urinary Incontinence, Female (Adult)   Urinary incontinence means loss of bladder control. This problem affects many women, especially as they get older. If you have incontinence, you may be embarrassed to ask for help. But know that this problem can be treated.   Types of Incontinence  There are different types of incontinence. Two of the main types are described here. You can have more than one type.     Stress incontinence. With this type, urine leaks when pressure (stress) is put on the bladder. This may happen when you cough, sneeze, or laugh. Stress incontinence most often occurs because the pelvic floor muscles that support the bladder and urethra are weak. This can happen after pregnancy and vaginal childbirth or a hysterectomy. It can also be due to excess body weight or hormone changes.    Urge incontinence (also called overactive bladder). With this type, a sudden urge to urinate is felt often. This may happen even though there may not be much urine in the bladder. The need to urinate often during the night is common. Urge incontinence most often occurs because of bladder spasms. This may be due to bladder  irritation or infection. Damage to bladder nerves or pelvic muscles, constipation, and certain medicines can also lead to urge incontinence.  Treatment depends on the cause. Further evaluation is needed to find the type you have. This will likely include an exam and certain tests. Based on the results, you and your healthcare provider can then plan treatment. Until a diagnosis is made, the home care tips below can help ease symptoms.   Home care    Do pelvic floor muscle exercises, if they are prescribed. The pelvic floor muscles help support the bladder and urethra. Many women find that their symptoms improve when doing special exercises that strengthen these muscles. To do the exercises, contract the muscles you would use to stop your stream of urine. But do this when you re not urinating. Hold for 10 seconds, then relax. Repeat 10 to 20 times in a row, at least 3 times a day. Your healthcare provider may give you other instructions for how to do the exercises and how often.    Keep a bladder diary. This helps track how often and how much you urinate over a set period of time. Bring this diary with you to your next visit with the provider. The information can help your provider learn more about your bladder problem.    Lose weight, if advised to by your provider. Extra weight puts pressure on the bladder. Your provider can help you create a weight-loss plan that s right for you. This may include exercising more and making certain diet changes.    Don't have foods and drinks that may irritate the bladder. These can include alcohol and caffeinated drinks.    Quit smoking. Smoking and other tobacco use can lead to a long-term (chronic) cough that strains the pelvic floor muscles. Smoking may also damage the bladder and urethra. Talk with your provider about treatments or methods you can use to quit smoking.    If drinking large amounts of fluid makes you have symptoms, you may be advised to limit your fluid  "intake. You may also be advised to drink most of your fluids during the day and to limit fluids at night.    If you re worried about urine leakage or accidents, you may wear absorbent pads to catch urine. Change the pads often. This helps reduce discomfort. It may also reduce the risk of skin or bladder infections.    Follow-up care  Follow up with your healthcare provider, or as directed. It may take some to find the right treatment for your problem. But healthy lifestyle changes can be made right away. These include such things as exercising on a regular basis, eating a healthy diet, losing weight (if needed), and quitting smoking. Your treatment plan may include special therapies or medicines. Certain procedures or surgery may also be options. Talk about any questions you have with your provider.   When to seek medical advice  Call the healthcare provider right away if any of these occur:    Fever of 100.4 F (38 C) or higher, or as directed by your provider    Bladder pain or fullness    Belly swelling    Nausea or vomiting    Back pain    Weakness, dizziness, or fainting  [email or web address] last reviewed this educational content on 1/1/2020 2000-2022 [employer]. All rights reserved. This information is not intended as a substitute for professional medical care. Always follow your healthcare professional's instructions.          Depression and Suicide in Older Adults  Nearly 2 million older adults in the [place] have some type of depression. Some of them even take their own lives. Yet depression among older adults is often ignored. Learning about the warning signs of depression may help spare a loved one needless pain. You may also save a life.   What is depression?  Depression is a common and serious illness. It affects the way you think and feel. It is not a normal part of aging. It is not a sign of weakness, a character flaw, or something you can \"snap out of.\" Most people with depression need treatment " to get better. The most common symptom is a feeling of deep sadness. People who are depressed also may seem tired and listless. And nothing seems to give them pleasure. It s normal to grieve or be sad sometimes. But sadness lessens or passes with time. Depression rarely goes away or improves on its own. Other symptoms of depression are:     Sleeping more or less than normal    Eating more or less than normal    Having headaches, stomachaches, or other pains that don t go away    Feeling nervous,  empty,  or worthless    Crying a lot    Thinking or talking about suicide or death    Loss of interest in activities previously enjoyed    Social isolation    Feeling confused or forgetful  What causes it?  The causes of depression aren t fully known. But it is thought to result from a complex blend of these factors:     Biochemistry. Certain chemicals in the brain play a role.    Genes. Depression does run in families.    Life stress. Life stresses can also trigger depression in some people. Older adults often face many stressors. These may include isolation, the death of friends or a spouse, health problems, and financial concerns.    Chronic health conditions. This includes diabetes, heart disease, or cancer. These can cause symptoms of depression. Medicine side effects can cause changes in thoughts and behaviors.  Giving support    Depressed people often may not want to ask for help. When they do, they may be ignored. Or they may get the wrong treatment. You can help by showing parents and older friends love and support. If they seem depressed, don t lecture the person or ignore the symptoms. Don't discount the symptoms as a  normal  part of aging. They are not. Get involved, listen, and show interest and support.   Help them understand that depression is a treatable illness. Tell them you can help them find the right treatment. Offer to go to their healthcare provider's appointment with them for support when the  symptoms are discussed. With their approval, contact a local mental health center, social service agency, or hospital about services.   Helping at healthcare visits  You can be an advocate for them at healthcare appointments. Many older adults have chronic illnesses. Many of these can cause symptoms of depression. Medicine side effects can change thoughts and behaviors.   You can help make sure that the healthcare provider looks at all of these factors. They should refer your family member or friend to a mental healthcare provider when needed. In some cases, untreated depression can lead to a misdiagnosis. A person may be diagnosed with a brain disorder such as dementia. If the healthcare provider does not take the issue of depression seriously, help your family member or friend find another provider.   Asking about self-harm thoughts  If you think an older person you care about could be suicidal, ask,  Have you thought about suicide?  Most people will tell you the truth. If they say yes, they may already have a plan for how and when they will attempt it. Find out as much as you can. The more detailed the plan, and the easier it is to carry out, the more danger the person is in right now. Tell the person you are there for them and you do not want them to get harmed. Don't wait to get help for the person. Call the person's healthcare provider, local hospital, or emergency services.   Call 988 in a crisis   Never leave the person alone. A person who is actively suicidal needs crisis care right away. They need constant supervision. Never leave the person out of sight. Call 988 Tell the crisis counselor you need help for a person who is thinking about suicide. The counselor will help you get the right level of crisis help. You may be advised to take the person to the nearest emergency room.   The National Suicide Prevention Lifeline is available at 988, or 594-095-MWCC (111-296-9915), or  www.suicidepreventionlifeline.org. When you call or text 022, you will be connected to trained counselors who are part of the National Suicide Prevention Lifeline network. An online chat option is also available. Lifeline is free and available 24/7.   To learn more    National Suicide Prevention Lifeline at www.suicidepreventionlifeline.org  or 867-516-SZKN (639-505-1099)    National [place] on Mental Illness at www.erlinda.org  or 997-201-TANB (546-074-3077)    Mental Health [facility] at www.[facility].org  or 220-701-9748    National [place] of Mental Health at www.[facility].nih.gov  or 149-366-4104    [employer] last reviewed this educational content on 7/1/2022 2000-2022 [employer]. All rights reserved. This information is not intended as a substitute for professional medical care. Always follow your healthcare professional's instructions.          Preventing Falls at Home  A person can fall for many reasons. Older adults may fall because reaction time slows down as we age. Your muscles and joints may get stiff, weak, or less flexible because of illness, medicines, or a physical condition.   Other health problems that make falls more likely include:     Arthritis    Dizziness or lightheadedness when you stand up (orthostatic hypotension)    History of a stroke    Dizziness    Anemia    Certain medicines taken for mental illness or to control blood pressure.    Problems with balance or gait    Bladder or urinary problems    History of falling    Changes in vision (vision impairment)    Changes in thinking skills and memory (cognitive impairment)  Injuries from a fall can include serious injuries such as broken bones, dislocated joints, internal bleeding and cuts. Injuries like these can limit your independence.   Prevention tips  To help prevent falls and fall-related injuries, follow the tips below.    Floors  To make floors safer:     Put nonskid pads under area rugs.    Remove small rugs.    Replace worn  floor coverings.    Tack carpets firmly to each step on carpeted stairs. Put nonskid strips on the edges of uncarpeted stairs.    Keep floors and stairs free of clutter and cords.    Arrange furniture so there are clear pathways.    Clean up any spills right away.  Bathrooms    To make bathrooms safer:     Install grab bars in the tub or shower.    Apply nonskid strips or put a nonskid rubber mat in the tub or shower.    Sit on a bath chair to bathe.    Use bathmats with nonskid backing.  Lighting  To improve visibility in your home:      Keep a flashlight in each room. Or put a lamp next to the bed within easy reach.    Put nightlights in the bedrooms, hallways, kitchen, and bathrooms.    Make sure all stairways have good lighting.    Take your time when going up and down stairs.    Put handrails on both sides of stairs and in walkways for more support. To prevent injury to your wrist or arm, don t use handrails to pull yourself up.    Install grab bars to pull yourself up.    Move or rearrange items that you use often. This will make them easier to find or reach.    Look at your home to find any safety hazards. Especially look at doorways, walkways, and the driveway. Remove or repair any safety problems that you find.  Other changes to make    Look around to find any safety hazards. Look closely at doorways, walkways, and the driveway. Remove or repair any safety problems that you find.    Wear shoes that fit well.    Take your time when going up and down stairs.    Put handrails on both sides of stairs and in walkways for more support. To prevent injury to your wrist or arm, don t use handrails to pull yourself up.    Install grab bars wherever needed to pull yourself up.    Arrange items that you use often. This will make them easier to find or reach.    [employer] last reviewed this educational content on 3/1/2020    2594-6368 [employer]. All rights reserved. This information is not intended as a  substitute for professional medical care. Always follow your healthcare professional's instructions.

## 2023-03-21 NOTE — Clinical Note
Please schedule pt for an hour appt 4/12/23 at 1pm, wellness visit plus multiple chronic cares, acute cares

## 2023-03-21 NOTE — LETTER
My COPD Action Plan     Name: Betty Tee    YOB: 1940   Date: 3/21/2023    My doctor: Ilene Tristan MD   [facility] [address] [facility] [address]  [facility] [address] [phone]  My Controller Medicine: Vilanterol/fluticasone (Breo Ellipta)   Dose: 100-25 mcg (1 puff daily)     My Rescue Medicine: Albuterol (Proair/Ventolin/Proventil) inhaler   Dose:108 mcg (2 puffs Q6H)     My Flare Up Medicine: { :[id number]}   Dose: ***     My COPD Severity: Mild = FeV1 > 80%      Use of Oxygen: Oxygen Not Prescribed      Make sure you've had your pneumonia   vaccines.          GREEN ZONE       Doing well today      Usual level of activity and exercise    Usual amount of cough and mucus    No shortness of breath    Usual level of health (thinking clearly, sleeping well, feel like eating) Actions:      Take daily medicines    Use oxygen as prescribed    Follow regular exercise and diet plan    Avoid cigarette smoke and other irritants that harm the lungs           YELLOW ZONE          Having a bad day or flare up      Short of breath more than usual    A lot more sputum (mucus) than usual    Sputum looks yellow, green, tan, brown or bloody    More coughing or wheezing    Fever or chills    Less energy; trouble completing activities    Trouble thinking or focusing    Using quick relief inhaler or nebulizer more often    Poor sleep; symptoms wake me up    Do not feel like eating Actions:      Get plenty of rest    Take daily medicines    Use quick relief inhaler every 6 hours    If you use oxygen, call you doctor to see if you should adjust your oxygen    Do breathing exercises or other things to help you relax    Let a loved one, friend or neighbor know you are feeling worse    Call your care team if you have 2 or more symptoms.  Start taking steroids or antibiotics if directed by your care team           RED ZONE       Need medical care now      Severe  shortness of breath (feel you can't breathe)    Fever, chills    Not enough breath to do any activity    Trouble coughing up mucus, walking or talking    Blood in mucus    Frequent coughing   Rescue medicines are not working    Not able to sleep because of breathing    Feel confused or drowsy    Chest pain    Actions:      Call your health care team.  If you cannot reach your care team, call 911 or go to the emergency room.        Annual Reminders:  Meet with Care Team, Flu Shot every Fall  Pharmacy: [id number]  [place], [place] - [address]

## 2023-03-21 NOTE — PROGRESS NOTES
Assessment & Plan       ICD-10-CM    1. Acute gastroenteropathy due to Norovirus  A08.11       2. YOANNA (acute kidney injury) (H)  N17.9       3. Stage 3b chronic kidney disease (H)  N18.32       4. Essential hypertension with goal blood pressure less than 140/90  I10 Basic metabolic panel  (Ca, Cl, CO2, Creat, Gluc, K, Na, BUN)     CANCELED: Basic metabolic panel  (Ca, Cl, CO2, Creat, Gluc, K, Na, BUN)      5. Type 2 diabetes mellitus with hyperglycemia, with long-term current use of insulin (H)  E11.65 Basic metabolic panel  (Ca, Cl, CO2, Creat, Gluc, K, Na, BUN)    Z79.4 Comprehensive metabolic panel (BMP + Alb, Alk Phos, ALT, AST, Total. Bili, TP)     Comprehensive metabolic panel (BMP + Alb, Alk Phos, ALT, AST, Total. Bili, TP)     CANCELED: Basic metabolic panel  (Ca, Cl, CO2, Creat, Gluc, K, Na, BUN)      6. Chronic heart failure with preserved ejection fraction (H)  I50.32       7. Persistent atrial fibrillation (H)  I48.19       8. Iron deficiency anemia, unspecified iron deficiency anemia type  D50.9 CBC with platelets     CBC with platelets      9. GAVE (gastric antral vascular ectasia)  K31.819       10. Paresthesia  R20.2 Comprehensive metabolic panel (BMP + Alb, Alk Phos, ALT, AST, Total. Bili, TP)     Vitamin B12     Comprehensive metabolic panel (BMP + Alb, Alk Phos, ALT, AST, Total. Bili, TP)     Vitamin B12      11. Pain in toes of both feet  M79.674 Vitamin B12    M79.675 Vitamin B12      12. Pain in finger of both hands  M79.645 Vitamin B12    M79.644 Vitamin B12         81yo s/p hospitalization for norovirus gastroenteritis (n/v), with hypovolemia, lactic acidosis (max lactate of 4.1 on admission).    --Stable/improved appetite/GI sx's today, though still very wary of eating many foods.  Doing better with fluids, protein drinks and soups.  Will continue to advance diet as tolerated.    HTN/LE edema/CKD/YOANNA-   GFR down to low of ~34 in hospital, but returned to 70s by time of  discharge.  Discharged on usual metoprolol, still off torsemide, potassium and spironolactone, but torsemide and potassium restarted at recent MTM visit due to pt's concerns of leg swelling.  Legs do not show any edema today (though compression stockings off, and pt notes the swelling is often noted above stockings when she has them on).  Will recheck CMP today.  Continue close follow-up with home BP checks, wt checks, and discussion at upcoming MTM and MD visits.    --DM II- Glucose levels stable/good for being off both long and short acting insulin during and after hospital stay, but does not have glucose readings with her today in clinic.  Reports she has only had to use sliding scale insulin dose a few times since being home.  Has been on ozempic, and basaglar 24 units in am, 8 units novolog w/ meals TID.  Still not eating as much, though, very wary of return of sx's.   Encouraged continued fluids and liquid diet/soups which are going down easier.  MTM planning on follow-up next week after recent labs back to review glucose/insulin/HTN/meds.    --A.fib s/p [facility] 11/23, CAD, CHF-  Continue on dapt through ~5/23, then asa indefinitely.  Continued on home atorvastatin 5mg/d    --GERD, GAVE, CASEY-   Hgb when not dehydrated stable in 10-11's.  Continued on home PPI.  Iron held on discharge, okay to restart now.    --Toe/finger tingling/numbness/pain- sx's in bilateral toes and fingers.  Will check labs as above including B12.  Discussed can be B12 deficiency, or DM sx's, though her DM has been under good control.  Consider neurology referral if worsening or no cause determined.    --Severe hip osteoparthritis-   Pt scheduled for hip surgery in 5/23.  Hopefully can stabilize from yet another setback prior to long desired surgery.  Will schedule another follow-up prior to pre-op along with close MTM follow-up to try and help this happen.    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    Continued on home singulair, loratadine, LABAICS, prn albuterol.    --Osteoporosis  Still taking the alendronate, but giving GI upset.  Now medicare Blue - not covering alendronate  **Will discuss with MTM.    --Sjogrens/PMR/h/o gout  Insurance also stating they will not cover the evovac from rheum- will discuss with Dr. Lopez.  Follow-up with rheumatology.    --MDD- cont lexapro 20mg/d    Labs today  Follow-up next week with MTM.  3-4 wks with Dr. Tristan, 4/12/23 appt, 5/10/23 pre-op.           MED REC REQUIRED  Post Medication Reconciliation Status: discharge medications reconciled and changed, per note/orders      Ilene Tristan MD  [facility] is a 82 year old accompanied by her friend, presenting for the following health issues:  Hospital F/U    -Pt recently hospitalized at [facility] from 3/07 - 3/09 for acute n/v/d and dehydration. Pt notes since discharge she's had a greatly reduced appetite, noting \"now I'm afraid of food.\"   -She also notes last night she had been coughing to the point of vomiting. Symptoms began on Sunday, with productive cough last night and dry cough today    [employer], packed, got meatloaf, first bite, something tasted wrong, 4-5 bites, got home, 5 minutes later.    911, 2-3am,   Food poisoning, norovirus.    Since then, appetite has not come back, lots of things make her nausea.  Able to do some soups, protein drinks, gatorade.    Energy is low, mostly has been home, out ~3 times.  No stomach pain.    Sore bottom, but putting on zinc oxide barrior cream.  Stools are back to normal.      Leg swelling - restarted torsemide last week with MTM visit.  Still off spironolactone.  Legs do not have any visible/palpable swelling today, but pt notes 'she sees it at the top of compression hose when she has them on'.  Unsure if difference since getting back on torsemide.    Wounds- before hospital, on back of " legs and heels.  Yvette put bacitracin on them and covered them.  Non-healing scratches.  These look okay now      Bilateral toes, L>R, stabbing little pains, comes/goes.  Also gets is in her fingers, R>L.  Going on for months, just there.  Doesn't feel like it's giving imbalance, can feel her toes.    MTM follow-up -  Restarted torsemide and potassium.  In hospital- they stopped her lantus as glucoses were <150.  Still off basaglar and mealtime insulin.   Still on sliding scale for now.  Most glucose levels have been around 130.        Still taking the alendronate, but giving GI upset.  Now medicare Blue - not covering alendronate  **Will discuss with MTM.  Or the evovac from rheum- will discuss with DR. Lopez.          Hospital Follow-up Visit:    Hospital/Nursing Home/IP Rehab Facility: [facility]  Date of Admission: 3/07/23  Date of Discharge: 3/09/23  Reason(s) for Admission: acute n/v/d and dehydration    Was your hospitalization related to COVID-19? No   Problems taking medications regularly:  None  Medication changes since discharge: restarted torsemide and potassium through MTM  Problems adhering to non-medication therapy:  None    Summary of hospitalization:  [facility] hospital discharge summary reviewed  Diagnostic Tests/Treatments reviewed.  Follow up needed: blood cultures, tests for today  Other Healthcare Providers Involved in Patient s Care:         MTM  Update since discharge: improved.    Plan of care communicated with patient and caregiver       Review of Systems   Respiratory: Positive for cough.    Genitourinary: Positive for frequency and genital sores.   Musculoskeletal: Positive for arthralgias.      Constitutional, HEENT, cardiovascular, pulmonary, gi and gu systems are negative, except as otherwise noted.      Objective    /72   Pulse 59   Temp 98.6  F (37  C) (Temporal)   Resp 16   Wt 77.6 kg (171 lb 1.6 oz)   SpO2 98%   BMI  28.47 kg/m    Body mass index is 28.47 kg/m .  Physical Exam   GENERAL: healthy, alert and no distress  EYES: Eyes grossly normal to inspection, PERRL and conjunctivae and sclerae normal  NECK: no adenopathy, no asymmetry, masses, or scars and thyroid normal to palpation  RESP: lungs clear to auscultation - no rales, rhonchi or wheezes  CV: regular rate and rhythm, normal S1 S2, no S3 or S4, no murmur, click or rub, no peripheral edema and peripheral pulses strong  ABDOMEN: soft, nontender, no hepatosplenomegaly, no masses and bowel sounds normal  MS: no gross musculoskeletal defects noted, no edema  SKIN: no suspicious lesions or rashes  NEURO: Normal strength and tone, mentation intact and speech normal  PSYCH: mentation appears normal, affect normal/bright    Lab on 03/14/2023   Component Date Value Ref Range Status     Hemoglobin A1C 03/14/2023 7.3 (H)  <5.7 % Final    Normal <5.7%   Prediabetes 5.7-6.4%    Diabetes 6.5% or higher     Note: Adopted from ADA consensus guidelines.     Parathyroid Hormone Intact 03/14/2023 42  15 - 65 pg/mL Final     Vitamin D, Total (25-Hydroxy) 03/14/2023 44  20 - 75 ug/L Final     Cholesterol 03/14/2023 82  <200 mg/dL Final     Triglycerides 03/14/2023 90  <150 mg/dL Final     Direct Measure HDL 03/14/2023 42 (L)  >=50 mg/dL Final     LDL Cholesterol Calculated 03/14/2023 22  <=100 mg/dL Final     Non HDL Cholesterol 03/14/2023 40  <130 mg/dL Final     TSH 03/14/2023 2.14  0.30 - 4.20 uIU/mL Final     Sodium 03/14/2023 139  136 - 145 mmol/L Final     Potassium 03/14/2023 3.8  3.4 - 5.3 mmol/L Final     Chloride 03/14/2023 106  98 - 107 mmol/L Final     Carbon Dioxide (CO2) 03/14/2023 22  22 - 29 mmol/L Final     Anion Gap 03/14/2023 11  7 - 15 mmol/L Final     Urea Nitrogen 03/14/2023 11.8  8.0 - 23.0 mg/dL Final     Creatinine 03/14/2023 0.76  0.51 - 0.95 mg/dL Final     Calcium 03/14/2023 8.9  8.8 - 10.2 mg/dL Final     Glucose 03/14/2023 167 (H)  70 - 99 mg/dL Final      Alkaline Phosphatase 03/14/2023 65  35 - 104 U/L Final     AST 03/14/2023 18  10 - 35 U/L Final     ALT 03/14/2023 16  10 - 35 U/L Final     Protein Total 03/14/2023 6.5  6.4 - 8.3 g/dL Final     Albumin 03/14/2023 3.3 (L)  3.5 - 5.2 g/dL Final     Bilirubin Total 03/14/2023 0.4  <=1.2 mg/dL Final     GFR Estimate 03/14/2023 78  >60 mL/min/1.73m2 Final    eGFR calculated using 2021 CKD-EPI equation.     WBC Count 03/14/2023 5.7  4.0 - 11.0 10e3/uL Final     RBC Count 03/14/2023 3.92  3.80 - 5.20 10e6/uL Final     Hemoglobin 03/14/2023 11.1 (L)  11.7 - 15.7 g/dL Final     Hematocrit 03/14/2023 34.5 (L)  35.0 - 47.0 % Final     MCV 03/14/2023 88  78 - 100 fL Final     MCH 03/14/2023 28.3  26.5 - 33.0 pg Final     MCHC 03/14/2023 32.2  31.5 - 36.5 g/dL Final     RDW 03/14/2023 15.9 (H)  10.0 - 15.0 % Final     Platelet Count 03/14/2023 214  150 - 450 10e3/uL Final     Magnesium 03/14/2023 1.8  1.7 - 2.3 mg/dL Final     Phosphorus 03/14/2023 3.2  2.5 - 4.5 mg/dL Final     Results for orders placed or performed in visit on 03/21/23   CBC with platelets     Status: Abnormal   Result Value Ref Range    WBC Count 6.1 4.0 - 11.0 10e3/uL    RBC Count 4.51 3.80 - 5.20 10e6/uL    Hemoglobin 13.0 11.7 - 15.7 g/dL    Hematocrit 40.1 35.0 - 47.0 %    MCV 89 78 - 100 fL    MCH 28.8 26.5 - 33.0 pg    MCHC 32.4 31.5 - 36.5 g/dL    RDW 16.1 (H) 10.0 - 15.0 %    Platelet Count 286 150 - 450 10e3/uL   Comprehensive metabolic panel (BMP + Alb, Alk Phos, ALT, AST, Total. Bili, TP)     Status: Abnormal   Result Value Ref Range    Sodium 137 136 - 145 mmol/L    Potassium 4.3 3.4 - 5.3 mmol/L    Chloride 100 98 - 107 mmol/L    Carbon Dioxide (CO2) 24 22 - 29 mmol/L    Anion Gap 13 7 - 15 mmol/L    Urea Nitrogen 18.2 8.0 - 23.0 mg/dL    Creatinine 1.01 (H) 0.51 - 0.95 mg/dL    Calcium 9.7 8.8 - 10.2 mg/dL    Glucose 158 (H) 70 - 99 mg/dL    Alkaline Phosphatase 80 35 - 104 U/L    AST 24 10 - 35 U/L    ALT 17 10 - 35 U/L    Protein Total 7.7  6.4 - 8.3 g/dL    Albumin 3.9 3.5 - 5.2 g/dL    Bilirubin Total 0.5 <=1.2 mg/dL    GFR Estimate 55 (L) >60 mL/min/1.73m2   Vitamin B12     Status: Normal   Result Value Ref Range    Vitamin B12 1,006 232 - 1,245 pg/mL                 Answers for HPI/ROS submitted by the patient on 3/21/2023  PHQ9 TOTAL SCORE: 8  DELORES 7 TOTAL SCORE: 6

## 2023-03-21 NOTE — NURSING NOTE
Per orders of Dr. Tristan, injection of Influenza HD given by Yvonne Fabian, JASON. Prior to immunization administration, verified patients identity using patient s name and date of birth. Patient instructed to remain in clinic for 15 minutes afterwards, and to report any adverse reaction to me or clinic staff immediately.    Yvonne Fabian RN on 3/21/2023 at 2:30 PM.    Please see Immunization Activity for additional information.

## 2023-03-22 ENCOUNTER — NURSE TRIAGE (OUTPATIENT)
Dept: FAMILY MEDICINE | Facility: CLINIC | Age: 83
End: 2023-03-22
Payer: MEDICARE

## 2023-03-22 LAB
ALBUMIN SERPL BCG-MCNC: 3.9 G/DL (ref 3.5–5.2)
ALP SERPL-CCNC: 80 U/L (ref 35–104)
ALT SERPL W P-5'-P-CCNC: 17 U/L (ref 10–35)
ANION GAP SERPL CALCULATED.3IONS-SCNC: 13 MMOL/L (ref 7–15)
AST SERPL W P-5'-P-CCNC: 24 U/L (ref 10–35)
BILIRUB SERPL-MCNC: 0.5 MG/DL
BUN SERPL-MCNC: 18.2 MG/DL (ref 8–23)
CALCIUM SERPL-MCNC: 9.7 MG/DL (ref 8.8–10.2)
CHLORIDE SERPL-SCNC: 100 MMOL/L (ref 98–107)
CREAT SERPL-MCNC: 1.01 MG/DL (ref 0.51–0.95)
DEPRECATED HCO3 PLAS-SCNC: 24 MMOL/L (ref 22–29)
GFR SERPL CREATININE-BSD FRML MDRD: 55 ML/MIN/1.73M2
GLUCOSE SERPL-MCNC: 158 MG/DL (ref 70–99)
POTASSIUM SERPL-SCNC: 4.3 MMOL/L (ref 3.4–5.3)
PROT SERPL-MCNC: 7.7 G/DL (ref 6.4–8.3)
SODIUM SERPL-SCNC: 137 MMOL/L (ref 136–145)
VIT B12 SERPL-MCNC: 1006 PG/ML (ref 232–1245)

## 2023-03-22 NOTE — TELEPHONE ENCOUNTER
Patient's friend is calling because patient tested positive for covid today. She had accompanied patient to clinic appointment yesterday. Prior to clinic visit, patient tested negative.     Friend saw her last night and is not with her currently. Patient has a runny nose, cough, and she is feeling weak.     She is asking about getting Paxlovid prescribed.     Informed friend that she should get more information from Betty about symptoms and how she is doing.      Please call friend back to talk about symptoms and possible medication. There is a consent to communicate signed 3/3/2021.    Dania Samayoa RN  [facility]

## 2023-03-22 NOTE — TELEPHONE ENCOUNTER
Called patient Friend Nghia  Onset of symptoms 03/19/2023  Patients symptoms include cough, fever, chills, diarrhea, vomiting. Denied SOB.  Patient scheduled for a VV tomorrow      Reason for Disposition    [1] COVID-19 diagnosed by positive lab test (e.g., PCR, rapid self-test kit) AND [2] mild symptoms (e.g., cough, fever, others) AND [3] no complications or SOB    Additional Information    Negative: SEVERE difficulty breathing (e.g., struggling for each breath, speaks in single words)    Negative: Difficult to awaken or acting confused (e.g., disoriented, slurred speech)    Negative: Bluish (or gray) lips or face now    Negative: Shock suspected (e.g., cold/pale/clammy skin, too weak to stand, low BP, rapid pulse)    Negative: Sounds like a life-threatening emergency to the triager    Negative: [1] Diagnosed or suspected COVID-19 AND [2] symptoms lasting 3 or more weeks    Negative: [1] COVID-19 exposure AND [2] no symptoms    Negative: COVID-19 vaccine reaction suspected (e.g., fever, headache, muscle aches) occurring 1 to 3 days after getting vaccine    Negative: COVID-19 vaccine, questions about    Negative: [1] Lives with someone known to have influenza (flu test positive) AND [2] flu-like symptoms (e.g., cough, runny nose, sore throat, SOB; with or without fever)    Negative: [1] Adult with possible COVID-19 symptoms AND [2] triager concerned about severity of symptoms or other causes    Negative: COVID-19 and breastfeeding, questions about    Negative: SEVERE or constant chest pain or pressure  (Exception: Mild central chest pain, present only when coughing.)    Negative: MODERATE difficulty breathing (e.g., speaks in phrases, SOB even at rest, pulse 100-120)    Negative: Headache and stiff neck (can't touch chin to chest)    Negative: Oxygen level (e.g., pulse oximetry) 90 percent or lower    Negative: Chest pain or pressure  (Exception: MILD central chest pain, present only when coughing)    Negative:  Patient sounds very sick or weak to the triager    Negative: MILD difficulty breathing (e.g., minimal/no SOB at rest, SOB with walking, pulse <100)    Negative: Fever > 103 F (39.4 C)    Negative: [1] Fever > 101 F (38.3 C) AND [2] over 60 years of age    Negative: [1] Fever > 100.0 F (37.8 C) AND [2] bedridden (e.g., nursing home patient, CVA, chronic illness, recovering from surgery)    Negative: [1] HIGH RISK for severe COVID complications (e.g., weak immune system, age > 64 years, obesity with BMI of 30 or higher, pregnant, chronic lung disease or other chronic medical condition) AND [2] COVID symptoms (e.g., cough, fever)  (Exceptions: Already seen by PCP and no new or worsening symptoms.)    Negative: [1] HIGH RISK patient AND [2] influenza is widespread in the community AND [3] ONE OR MORE respiratory symptoms: cough, sore throat, runny or stuffy nose    Negative: [1] HIGH RISK patient AND [2] influenza exposure within the last 7 days AND [3] ONE OR MORE respiratory symptoms: cough, sore throat, runny or stuffy nose    Negative: Oxygen level (e.g., pulse oximetry) 91 to 94 percent    Negative: [1] COVID-19 infection suspected by caller or triager AND [2] mild symptoms (cough, fever, or others) AND [3] negative COVID-19 rapid test    Negative: Fever present > 3 days (72 hours)    Negative: [1] Fever returns after gone for over 24 hours AND [2] symptoms worse or not improved    Negative: [1] Continuous (nonstop) coughing interferes with work or school AND [2] no improvement using cough treatment per Care Advice    Negative: Cough present > 3 weeks    Negative: [1] COVID-19 diagnosed by positive lab test (e.g., PCR, rapid self-test kit) AND [2] NO symptoms (e.g., cough, fever, others)    Protocols used: CORONAVIRUS (COVID-19) DIAGNOSED OR [id number] HARGROVE RN   [facility]

## 2023-03-23 ENCOUNTER — VIRTUAL VISIT (OUTPATIENT)
Dept: FAMILY MEDICINE | Facility: CLINIC | Age: 83
End: 2023-03-23
Payer: MEDICARE

## 2023-03-23 DIAGNOSIS — U07.1 INFECTION DUE TO 2019 NOVEL CORONAVIRUS: Primary | ICD-10-CM

## 2023-03-23 DIAGNOSIS — E11.65 TYPE 2 DIABETES MELLITUS WITH HYPERGLYCEMIA, WITH LONG-TERM CURRENT USE OF INSULIN (H): ICD-10-CM

## 2023-03-23 DIAGNOSIS — I48.19 PERSISTENT ATRIAL FIBRILLATION (H): ICD-10-CM

## 2023-03-23 DIAGNOSIS — I10 ESSENTIAL HYPERTENSION WITH GOAL BLOOD PRESSURE LESS THAN 140/90: ICD-10-CM

## 2023-03-23 DIAGNOSIS — I50.32 CHRONIC HEART FAILURE WITH PRESERVED EJECTION FRACTION (H): ICD-10-CM

## 2023-03-23 DIAGNOSIS — Z79.4 TYPE 2 DIABETES MELLITUS WITH HYPERGLYCEMIA, WITH LONG-TERM CURRENT USE OF INSULIN (H): ICD-10-CM

## 2023-03-23 PROCEDURE — 99442 PR PHYSICIAN TELEPHONE EVALUATION 11-20 MIN: CPT | Mod: CS | Performed by: FAMILY MEDICINE

## 2023-03-23 NOTE — TELEPHONE ENCOUNTER
Patient friend alejandrina called again  Patient is now having diarrhea ( 2-3 episodes since last night)  Patient also continues to vomit and running a low grad temp  Denies SOB  Has a VV appointment today at 12 pm.      Aubrey HARGROVE RN   [facility]

## 2023-03-23 NOTE — PROGRESS NOTES
Betty is a 82 year old who is being evaluated via a billable telephone visit.      How would you like to obtain your AVS? Susanaharluis a  If the video visit is dropped, the invitation should be resent by: Send to e-mail at: [email or web address]  Will anyone else be joining your video visit? Yes: liz . How would they like to receive their invitation? Send to e-mail at: [email or web address]          Assessment & Plan     Infection due to 2019 novel coronavirus  We discussed treatment options and she was given a prescription for molnupiravir since she is on Plavix.  I recommended she continue to work on getting her rest and staying hydrated.  If she develops any signs or symptoms of respiratory distress she will seek medical attention right away.  - molnupiravir (LAGEVRIO) 200 MG capsule; Take 4 capsules (800 mg) by mouth every 12 hours for 5 days    Type 2 diabetes mellitus with hyperglycemia, with long-term current use of insulin (H)  This issue has been stable on her current medications.  On 3/14/2023 her hemoglobin A1c was 7.3%.    Persistent atrial fibrillation (H)  Continue aspirin, Plavix and metoprolol    Essential hypertension with goal blood pressure less than 140/90  Blood pressures have been stable on her current medications.    Chronic heart failure with preserved ejection fraction (H)  This issue has been stable on her current medications.             MED REC REQUIRED  Post Medication Reconciliation Status:  Discharge medications reconciled and changed, see notes/orders          William Amaral, [facility]   Betty is a 82 year old, presenting for the following health issues:  Covid Concern  No flowsheet data found.  HPI       COVID-19 Symptom Review  How many days ago did these symptoms start? 4 days    Are any of the following symptoms significant for you?    New or worsening difficulty breathing? No    Worsening cough? Yes, it's a dry cough.     Fever or  chills? Yes, I felt feverish or had chills.    Headache: YES    Sore throat: No    Chest pain: No    Diarrhea: YES    Body aches? No    What treatments has patient tried? Cough syrup   Does patient live in a nursing home, group home, or shelter? No  Does patient have a way to get food/medications during quarantined? Yes, I have a friend or family member who can help me.          She has a medical history significant for type 2 diabetes, HFpEF, atrial fibrillation, Sjogren's syndrome, interstitial lung disease, rheumatoid arthritis/gout, depression, hyperlipidemia and hypertension.  She feels like these health issues are stable on her current medications.  On 63/14/23 her hemoglobin A1c was 7.3%.          Review of Systems   Constitutional, HEENT, cardiovascular, pulmonary, GI, , musculoskeletal, neuro, skin, endocrine and psych systems are negative, except as otherwise noted.      Objective    Vitals - Patient Reported  Weight (Patient Reported): 77.1 kg (170 lb)      Vitals:  No vitals were obtained today due to virtual visit.    Physical Exam   GENERAL: Sounds fatigued, alert and no distress  RESP: No audible wheeze, cough.    NEURO:   Mentation and speech appropriate for age.  PSYCH: Mentation appears normal, affect normal/bright, judgement and insight intact, normal speech and appearance well-groomed.                  Type of service:  Telephone visit 15 minutes    Originating Location (pt. Location): Home    Distant Location (provider location):  On-site  Platform used for Video Visit: Telephone

## 2023-03-28 ENCOUNTER — TELEPHONE (OUTPATIENT)
Dept: CARDIOLOGY | Facility: CLINIC | Age: 83
End: 2023-03-28

## 2023-03-28 NOTE — TELEPHONE ENCOUNTER
Health Call Center    Phone Message    May a detailed message be left on voicemail: yes     Reason for Call: Other: Nghia called on behalf of patient wanting to speak to someone on the care team. Patient is sick and unsure if they should attend appointment on Friday. Please call Nghia back to further discuss.      Action Taken: Message routed to:  Other: Cardiology    Travel Screening: Not Applicable     Thank you!  [facility]

## 2023-03-29 NOTE — PROGRESS NOTES
Virtual Visit Details    Type of service:  Video Visit   Video Start Time: 11:35 AM  Video End Time:11:57 AM    Originating Location (pt. Location): Home    Distant Location (provider location):  On-site  Platform used for Video Visit: [facility] HEART CARE  CARDIOVASCULAR DIVISION    STRUCTURAL CLINIC RETURN VISIT    PRIMARY CARDIOLOGIST: Dr. Rosa      PERTINENT CLINICAL HISTORY:     Betty Tee is a very pleasant 82 year old female who presents for 6 month Watchman follow-up. 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 Her post procedure VERONICA showed well seated closure device without significant leak or evidence of pericardial effusion. She was discharged on Xarelto and ASA. She was readmitted with anemia and rectal bleeding, Xarelto stopped and ASA continued. She has since been on DAPT for 4.5 months without recurrent bleeding.     She presents with her friend Nghia via video visit. Betty says she was hospitalized with Norovirus a few weeks ago. She was dehydrated, received IVF and diuretics were held. Her torsemide and potassium were restarted 3/16 at her usual 40 mg a.m/30 mg p.m. dose. Repeat labs showed slight increase in creatinine though within normal range. Last week developed cold/flu symptoms and was diagnosed with COVID. She is still recovering. Her weight is down from 180 to 165 lbs. She continues to lose weight, doesn't have the best appetite. She feels as though her fluid status is stable, doesn't feel particularly dehydrated or hypervolemic, has been maintaining oral hydration. She otherwise denies exertional chest pain, shortness of breath, orthopnea, PND, leg  swelling, weight gain, palpitations, lightheadedness or syncope. Activity is mainly limited by severe right hip pain. She is scheduled to undergo hip replacement May 31st. Home blood pressure have been well controlled in the 1 teens. They are wondering if she should restart her spironolactone.      PAST MEDICAL HISTORY:     Past Medical History:   Diagnosis Date     Alcohol abuse, in remission      Allergic rhinitis, cause unspecified     allegra helps when she takes it     Antiplatelet or antithrombotic long-term use      Atrial fibrillation (H)     in hosp in 11/11 after surgery w/ fluid overload     Cardiomegaly     LVH on stress echo- cardiac w/u at [facility]- neg CT scan for PE, neg stress echo in 8/06     Chest pain, unspecified      Congestive heart failure (H)      Depressive disorder      Diabetes (H)      Disorder of bone and cartilage, unspecified     osteopenia (had been on prempro), improved on 6/06 dexa, stable dexa 11/10     Diverticulosis of colon (without mention of hemorrhage)     last episode yrs ago     Essential hypertension, benign      Follicular bronchiolitis (H)     associated with Sjogrens, dx by chest CT showing mosaic attenuation and air trapping     Gastro-oesophageal reflux disease      ILD (interstitial lung disease) (H)     associated with Sjogrens, also has mildly elevated IgG4, first noted on chest CT 2015 (mild changes) and also has small airways disease; ILD improved on follow up chest CT 2018.     Insomnia, unspecified     weaned off clonazepam     Irregular heart beat      Lumbago 07/2009    MRI with DJD, now seeing Dr. Cain for sciatic sx's     Major depressive disorder, recurrent episode, moderate (H)      Obstructive sleep apnea     uses cpap     Osteoarthrosis, unspecified whether generalized or localized, unspecified site      Sjogren's syndrome (H)     + RG and SSA and lip bx     Sleep apnea     uses a cpap machine     Tobacco use disorder     chantix in 9/07, started  again in 6/08, working      PAST SURGICAL HISTORY:     Past Surgical History:   Procedure Laterality Date     APPENDECTOMY       BACK SURGERY  1962     BACK SURGERY  1962     BIOPSY BREAST  09/27/2002    Biopsy Left Breast     BIOPSY BREAST Left 09/27/2002     BREAST SURGERY       CARDIAC SURGERY       CARDIAC SURGERY       CHOLECYSTECTOMY  1990's?     CHOLECYSTECTOMY       COLECTOMY LEFT  11/07/2011    Procedure:COLECTOMY LEFT; Laparoscopic mobilization of splenic flexture, sigmoid colectomy, coloprotoscopy, loop illeostomy; Surgeon:CK SIDDIQI; Location:[facility] OR     COLECTOMY LEFT  11/07/2011     COLONOSCOPY  1990,s     COLONOSCOPY N/A 5/3/2022    Procedure: COLONOSCOPY;  Surgeon: Guru Winsome Dias MD;  Location: [facility] GI     CV LEFT ATRIAL APPENDAGE CLOSURE N/A 9/26/2022    Procedure: Left Atrial Appendage Closure;  Surgeon: Uzair Crews MD;  Location:  HEART CARDIAC CATH LAB     ENT SURGERY       EYE SURGERY  2012     FLEXIBLE SIGMOIDOSCOPY  11/03/2011     HYSTERECTOMY TOTAL ABDOMINAL, BILATERAL SALPINGO-OOPHORECTOMY, COMBINED  11/07/2011    Procedure:COMBINED HYSTERECTOMY TOTAL ABDOMINAL, BILATERAL SALPINGO-OOPHORECTOMY; total abdominal hysterectomy, bilateral salpingo-oophorectomy; Surgeon:ALETA MANUEL; Location:[facility]     HYSTERECTOMY TOTAL ABDOMINAL, BILATERAL SALPINGO-OOPHORECTOMY, COMBINED  11/07/2011     ILEOSTOMY  02/01/2012    takedown loop ileostomy      INSERT STENT URETER  11/07/2011    Procedure:INSERT STENT URETER; Placement of Bilateral Ureteral Stents ; Surgeon:PRANEETH BRYNAT; Location:[facility] OR     SIGMOIDECTOMY  02/01/2012    Dr. Siddiqi, Sigmoidectomy for diverticular abscess, iliostomy afterwards until repair     SIGMOIDOSCOPY FLEXIBLE  11/03/2011    Procedure:SIGMOIDOSCOPY FLEXIBLE; Flexible Sigmoidoscopy; Surgeon:CK SIDDIQI; Location:[facility] OR     TAKEDOWN ILEOSTOMY  02/01/2012    Procedure:TAKEDOWN ILEOSTOMY; Takedown Loop Ileostomy ; Surgeon:CK SIDDIQI;  Location:[facility] OR     URETERAL STENT PLACEMENT  11/07/2011     RUST APPENDECTOMY  1970's?     RUST NONSPECIFIC PROCEDURE  11/2005    exploratory abd lap, adhesions, resolved RLQ pain, diverticulitis episodes      CURRENT MEDICATIONS:     Current Outpatient Medications   Medication Sig Dispense Refill     ACCU-CHEK SHANNON PLUS test strip USE TO TEST BLOOD SUGARS 3 TIMES DAILY 300 strip 5     acetaminophen (TYLENOL) 500 MG tablet Take 1,000 mg by mouth every 8 hours as needed (max 6 tablets/24 hours, 2 tablets/dose)       acyclovir (ZOVIRAX) 400 MG tablet Take 1 tablet (400 mg) by mouth 3 times daily as needed For a couple days 15 tablet 2     acyclovir (ZOVIRAX) 5 % external ointment Apply topically as needed (6 times day PRN for outbreaks) As needed for outbreaks 15 g 3     albuterol (PROAIR HFA/PROVENTIL HFA/VENTOLIN HFA) 108 (90 Base) MCG/ACT inhaler Inhale 2 puffs into the lungs every 6 hours 18 g 11     alendronate (FOSAMAX) 70 MG tablet Take 1 tablet (70 mg) by mouth every 7 days 4 tablet 1     allopurinol (ZYLOPRIM) 100 MG tablet Take 1 tablet (100 mg) by mouth daily 90 tablet 1     anakinra (KINERET) 100 MG/0.67ML SOSY injection 100 mg every day x 3 days as needed for flare ups 6.7 mL 3     aspirin (ASA) 81 MG EC tablet Take 1 tablet (81 mg) by mouth daily 90 tablet 1     atorvastatin (LIPITOR) 10 MG tablet TAKE 1/2 TABLET BY MOUTH EVERY DAY 45 tablet 0     azithromycin (ZITHROMAX) 250 MG tablet TAKE 1 TABLET BY MOUTH EVERY DAY 30 tablet 5     BD PEN NEEDLE JANE 2ND GEN 32G X 4 MM miscellaneous USE TO TEST 4 TIMES A  each 1     blood glucose (NO BRAND SPECIFIED) lancets standard Use to test blood sugar 3 times daily or as directed 100 lancet 3     cevimeline (EVOXAC) 30 MG capsule Take 1 capsule (30 mg) by mouth 3 times daily (Patient taking differently: Take 30 mg by mouth 3 times daily as needed) 90 capsule 11     Cholecalciferol (VITAMIN D3) 50 MCG (2000 UT) CAPS TAKE 100 MCG BY MOUTH DAILY (TAKE 2 TABLET  (50 MCG) BY MOUTH DAILY - ORAL) 180 capsule 0     clopidogrel (PLAVIX) 75 MG tablet Take 1 tablet (75 mg) by mouth daily 90 tablet 1     COMPOUNDED NON-CONTROLLED SUBSTANCE (CMPD RX) - PHARMACY TO MIX COMPOUNDED MEDICATION Estriol 1 mg/g Apply small amount to finger and apply to inside vagina daily for 2 weeks then twice weekly Route: vaginally Dispense 30 grams 11 refills 30 g 11     cyanocobalamin (VITAMIN B-12) 1000 MCG tablet Take 1 tablet (1,000 mcg) by mouth daily (Patient taking differently: Take 1,000 mcg by mouth three times a week) 30 tablet 1     escitalopram (LEXAPRO) 20 MG tablet TAKE 1 TABLET BY MOUTH EVERY DAY 90 tablet 0     ferrous gluconate (FERGON) 324 (38 Fe) MG tablet Take 1 tablet (324 mg) by mouth daily (with breakfast) 90 tablet 1     fluticasone (FLONASE) 50 MCG/ACT nasal spray SPRAY 1-2 SPRAYS INTO BOTH NOSTRILS DAILY AS NEEDED FOR ALLERGIES 16 mL 11     fluticasone-vilanterol (BREO ELLIPTA) 100-25 MCG/INH inhaler Inhale 1 puff into the lungs daily 1 each 11     hydroxychloroquine (PLAQUENIL) 200 MG tablet Take 2 tablets (400 mg) by mouth daily Get annual eye exams for hydroxychloroquine (Plaquenil) monitoring and fax to [phone] 180 tablet 3     insulin lispro (HUMALOG KWIKPEN) 100 UNIT/ML (1 unit dial) KWIKPEN Inject 8 Units Subcutaneous 3 times daily (before meals)       ketoconazole (NIZORAL) 2 % external cream Apply topically 2 times daily as needed for itching 60 g 1     KLOR-CON 20 MEQ CR tablet TAKE 2 TABLETS (40 MEQ) BY MOUTH EVERY MORNING AND 1 TABLET (20 MEQ) EVERY EVENING. 270 tablet 3     lidocaine (LIDODERM) 5 % patch APPLY PATCH TO PAINFUL AREA FOR UP TO 12 H WITHIN A 24 H PERIOD. REMOVE AFTER 12 HOURS. (Patient taking differently: Apply patch to painful area for up to 12 h within a 24 h period.  Remove after 12 hours.) 30 patch 2     loratadine (CLARITIN) 10 MG tablet TAKE 1 TABLET BY MOUTH EVERY DAY 90 tablet 3     methocarbamol (ROBAXIN) 750 MG tablet Take 1 tablet  (750 mg) by mouth 3 times daily as needed for muscle spasms 90 tablet 3     metoprolol succinate ER (TOPROL XL) 50 MG 24 hr tablet Take 1 tablet (50 mg) by mouth 2 times daily 90 tablet 3     montelukast (SINGULAIR) 10 MG tablet TAKE 1 TABLET BY MOUTH EVERYDAY AT BEDTIME 90 tablet 0     oxyCODONE-acetaminophen (PERCOCET) 7.5-325 MG per tablet Take 1 tablet by mouth 2 times daily as needed for severe pain (7-10) With at least 4 hours between doses. Fill and start 2/17/23 60 tablet 0     OZEMPIC, 1 MG/DOSE, 4 MG/3ML pen INJECT 1 MG SUBCUTANEOUS ONCE A WEEK 3 mL 1     pantoprazole (PROTONIX) 40 MG EC tablet Take 1 tablet (40 mg) by mouth daily (replaces famotidine- should stop taking famotidine) 90 tablet 3     predniSONE (DELTASONE) 5 MG tablet Take 1 tablet (5 mg) by mouth daily 90 tablet 1     spironolactone (ALDACTONE) 25 MG tablet Take 2 tablets (50 mg) by mouth daily (Patient not taking: Reported on 3/16/2023) 180 tablet 3     torsemide (DEMADEX) 20 MG tablet TAKE 2 TABLETS (40 MG) BY MOUTH EVERY MORNING AND 1 TABLET (20 MG) DAILY AT 2 PM. TAKE THE AFTERNOON DOSE WITH AN EXTRA 10 MG TAB FOR A TOTAL OF 30 MG IN THE AFTERNOON 270 tablet 3     traZODone (DESYREL) 50 MG tablet TAKE 3 TABLETS (150 MG) BY MOUTH NIGHTLY AS NEEDED FOR SLEEP 270 tablet 0        ALLERGIES:     Allergies   Allergen Reactions     Amoxicillin-Pot Clavulanate      Augmentin Nausea and Vomiting     Codeine Nausea and Vomiting     PN: LW Reaction: HIVES     Penicillins Nausea and Vomiting     PN: LW Reaction: GI Upset     Phenobarbital Itching     Seasonal Allergies         FAMILY HISTORY:     Family History   Problem Relation Age of Onset     C.A.D. Mother 63        MI- first at age 63     Heart Disease Mother      Hypertension Mother      Cerebrovascular Disease Mother      Hyperlipidemia Mother      Coronary Artery Disease Mother         MI-first at age 63     Other - See Comments Mother         cerebrovascular disease      Alcohol/Drug  Father      Alzheimer Disease Father      Dementia Father      Hypertension Father      Hyperlipidemia Father      Substance Abuse Father      Diabetes Sister      Hypertension Sister      Hyperlipidemia Sister      Substance Abuse Sister      Asthma Sister      C.A.D. Sister 52        Minor MI- age 50's     Heart Disease Sister      Hypertension Sister      Hypertension Sister      Cancer - colorectal Sister 48        Late 40's early 50's     Gastrointestinal Disease Sister         Diverticulitis     Lipids Sister      Lipids Sister      Diabetes Sister      Heart Disease Sister         CHF     Cancer Sister         lung, smoker     Substance Abuse Sister      Asthma Sister      Cancer Sister      Coronary Artery Disease Sister         minor MI-age 50's     Heart Disease Sister      Hypertension Sister      Hypertension Sister      Colon Cancer Sister      Diverticulitis Sister      Lung Cancer Sister      Heart Disease Sister         CHF     Diabetes Sister      Asthma Sister      Hypertension Brother      Prostate Cancer Brother 74        Dx'd age 74     Gastrointestinal Disease Brother         Diverticulitis     Parkinsonism Brother      Substance Abuse Brother      Prostate Cancer Brother      Hyperlipidemia Brother      Hypertension Brother      Prostate Cancer Brother      Diverticulitis Brother      Parkinsonism Brother      Breast Cancer Daughter      Breast Cancer Daughter      Diabetes Other      Hypertension Other      Anesthesia Reaction No family hx of      Deep Vein Thrombosis (DVT) No family hx of         SOCIAL HISTORY:     Social History     Socioeconomic History     Marital status: Single     Number of children: 0     Years of education: Ed Spec De   Occupational History     Occupation: Professor     Employer: [employer]     Comment: [employer]- Education   Tobacco Use     Smoking status: Former Smoker     Packs/day: 0.50     Years: 10.00     Pack years: 5.00      Types: Cigarettes     Quit date: 2011     Years since quittin.1     Smokeless tobacco: Never Used     Tobacco comment:  ppd   Substance and Sexual Activity     Alcohol use: No     Alcohol/week: 0.0 standard drinks     Comment: In recovery beginning      Drug use: No     Sexual activity: Never   Other Topics Concern     Parent/sibling w/ CABG, MI or angioplasty before 65F 55M? Yes   Social History Narrative                    REVIEW OF SYSTEMS:     Constitutional: No fevers or chills  Skin: No new rash or itching  Eyes: No acute change in vision  Ears/Nose/Throat: No purulent rhinorrhea, new hearing loss, or new vertigo  Respiratory: No cough or hemoptysis  Cardiovascular: See HPI  Gastrointestinal: No change in appetite, vomiting, hematemesis or diarrhea  Genitourinary: No dysuria or hematuria  Musculoskeletal: No new back pain, neck pain or muscle pain  Neurologic: No new headaches, focal weakness or behavior changes  Psychiatric: No hallucinations, excessive alcohol consumption or illegal drug usage  Hematologic/Lymphatic/Immunologic: No bleeding, chills, fever, night sweats or weight loss  Endocrine: No new cold intolerance, heat intolerance, polyphagia, polydipsia or polyuria      PHYSICAL EXAMINATION:     There were no vitals taken for this visit. due to video visit.     GENERAL: No acute distress.  HEENT: EOMI. Sclerae white, not injected. Nares clear. Pharynx without erythema or exudate.   Neck:. No jugular venous distension.   Lungs: Non labored breathing   Extremities: No clubbing, cyanosis, or edema.   Neurologic: Alert and oriented to person/place/time, normal speech and affect. No focal deficits.  Skin: No petechiae, purpura or rash.     LABORATORY DATA:     LIPID RESULTS:  Lab Results   Component Value Date    CHOL 82 2023    CHOL 115 10/20/2020    HDL 42 (L) 2023    HDL 71 10/20/2020    LDL 22 2023    LDL 19 10/20/2020    TRIG 90 2023    TRIG 125 10/20/2020     CHOLHDLRATIO 2.5 08/06/2014       LIVER ENZYME RESULTS:  Lab Results   Component Value Date    AST 24 03/21/2023    AST 17 06/04/2021    ALT 17 03/21/2023    ALT 25 06/04/2021       CBC RESULTS:  Lab Results   Component Value Date    WBC 6.1 03/21/2023    WBC 8.6 06/04/2021    RBC 4.51 03/21/2023    RBC 4.46 06/04/2021    HGB 13.0 03/21/2023    HGB 13.4 06/04/2021    HCT 40.1 03/21/2023    HCT 42.1 06/04/2021    MCV 89 03/21/2023    MCV 94 06/04/2021    MCH 28.8 03/21/2023    MCH 30.0 06/04/2021    MCHC 32.4 03/21/2023    MCHC 31.8 06/04/2021    RDW 16.1 (H) 03/21/2023    RDW 15.5 (H) 06/04/2021     03/21/2023     06/04/2021       BMP RESULTS:  Lab Results   Component Value Date     03/21/2023     06/04/2021    POTASSIUM 4.3 03/21/2023    POTASSIUM 4.1 08/23/2022    POTASSIUM 4.0 06/04/2021    CHLORIDE 100 03/21/2023    CHLORIDE 107 08/23/2022    CHLORIDE 106 06/04/2021    CO2 24 03/21/2023    CO2 22 08/23/2022    CO2 25 06/04/2021    ANIONGAP 13 03/21/2023    ANIONGAP 8 08/23/2022    ANIONGAP 6 06/04/2021     (H) 03/21/2023     (H) 03/09/2023     (H) 08/23/2022     (H) 06/04/2021    BUN 18.2 03/21/2023    BUN 17 08/23/2022    BUN 14 06/04/2021    CR 1.01 (H) 03/21/2023    CR 1.11 (H) 06/04/2021    GFRESTIMATED 55 (L) 03/21/2023    GFRESTIMATED 47 (L) 06/04/2021    GFRESTBLACK 54 (L) 06/04/2021    CLAUDY 9.7 03/21/2023    CLAUDY 8.8 06/04/2021        A1C RESULTS:  Lab Results   Component Value Date    A1C 7.3 (H) 03/14/2023    A1C 7.1 (H) 06/04/2021       INR RESULTS:  Lab Results   Component Value Date    INR 1.43 (H) 11/10/2022    INR 1.37 (H) 09/23/2022    INR 1.36 (H) 03/03/2020    INR 2.37 (H) 02/14/2019          PROCEDURES & FURTHER ASSESSMENTS:   EKG 3/7/23:    CT heart 11/10/22:    IMPRESSION:  1.  There is a Watchman 24 mm closure device in the left atrial  appendage. It is well-seated without evidence of thrombus on the left  atrial side of the device.  2.   There is no contrast opacification of the left atrial appendage.  3.  Please review the separate Radiology report from the original  study site and date for incidental noncardiac findings. This report  will follow separately.     FINDINGS:   1.  There is a Watchman 24 mm closure device in the left atrial  appendage. It is well-seated.   2.  There is no thrombus on the left atrial side of the closure device  on early or delayed imaging.   3.  There is no contrast opacification of the left atrial appendage,  implying complete endothelialization of the occlusion device.   4.  Normal pulmonary venous anatomy with all pulmonary veins draining  into the left atrium.  5.  Coronary images are non-diagnostic for stenosis/atherosclerosis  due to cardiac motion artifact.   6.  The visualized aortic root, ascending aorta and descending  thoracic aorta are normal.  7.    Please review the separate Radiology report from the original study  site and date for incidental noncardiac findings. This report will  follow separately.     CARLTON LANCE MD           CLINICAL IMPRESSION:     Betty Tee is a very pleasant 82 year old female who presents for 6 month Watchman follow-up.     1. Paroxysmal a-fib with intolerance to AC  2. 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 Post-procedure VERONICA showed no evidence of benigno-device leak and no pericardial effusion. Her 45 day CT showed well seated LAAO without leak or thrombus, no contrast opacification in the ZACHARY suggesting complete endothelialization. Her anticoagulation was held and 4.5 months of DAPT completed.   - Discontinue Plavix now that 6 months post LAAO   - Continue ASA 81 mg daily lifelong   - No longer requires SBE prophylaxis given that she is 6 months post LAAO     3. Chronic diastolic heart failure:  4. Mild CKD  4. HTN  5. HLD  6. ANGELICA  Recent admission for dehydration related norovirus, spironolactone  and torsemide were held. Overall has lost 15 lbs since recent admission, continues to lose weight due to poor appetite though is maintaining hydration. Torsemide restarted 3/16 and repeat BMP 3/21 with slight increase in creatinine but appears to be at baseline.  Suspect euvolemic to hypovolemic - difficult to tell by video.   - Continue current dose of torsemide for now - repeat BMP today  - If kidney function stable will continue torsemide, if worsening decrease torsemide  - BP well controlled 1 teens, continue to hold spironolactone, discuss at follow-up with Dr. Rosa 4/13  - Continue atorvastatin for cholesterol management  - Continue CPAP     7. Type II DM:   - Insulin dependent with novolog and lantus. Also on Ozempic. Continue current regimen.      8. Severe right hip pain 2/2 OA: Hip replacement schedule 5/31/23     9. PMR: Continue daily prednisone   10. Sjogrens: Continue Cevimeline for dry mouth  11. Gout: PRN anakinra for gout     RTC: Graduated from structural clinic, patient can continue to follow with LIZY Lawson, CNP  [facility]       CC  Patient Care Team:  Ilene Tristan MD as PCP - General  Ilene Tristan MD as Assigned PCP  Kirill Polk MD as MD (Otolaryngology)  Aubrey Hurtado MD as MD (Cardiology)  Anderson Perez MD as MD (Clinical Cardiac Electrophysiology)  Radha Rosa MD as MD (Cardiology)  Kiesha Elias APRN CNP as Nurse Practitioner (Cardiology)  Beatriz Blanco, RN as Nurse Coordinator (Cardiology)  Carmenza Stringer MD as MD (Rheumatology)  Danay Payne, JASON as Specialty Care Coordinator (Cardiology)  aSbrina Meek [facility] as Pharmacist (Pharmacist)  Flor Velasquez, RN as Specialty Care Coordinator (Cardiology)  Zulma Lopez MD as MD (Rheumatology)  Kami Lang MD as MD (Ophthalmology)  Reina Betancur MD as MD (Internal Medicine)  Zulma Lopez,  MD as Referring Physician (Rheumatology)  Zulma Lopez MD as Assigned Rheumatology Provider  Maryjane Tillman MD as Assigned Pulmonology Provider  Kelechi Santana MD as Assigned Surgical Provider  Thomas Shannon MD as Assigned Musculoskeletal Provider  Sabrina Meek, [facility] as Assigned [facility] Pharmacist  Guru Winsome Dias MD as Assigned Gastroenterology Provider  Marina Soler NP as Assigned Heart and Vascular Provider  Suzanne Whitman, [facility] as Pharmacist (Pharmacist)  Alanna Alba MD as Assigned Pain Medication Provider

## 2023-03-29 NOTE — RESULT ENCOUNTER NOTE
-Your comprehensive metabolic panel (which includes electrolyte levels, blood sugar levels, and kidney and liver function tests) looks okay. Your GFR was higher than normal the last two checks, now it's at 55 again, closer to your usual baseline.  We'll want to continue to monitor your kidney function.  -Your CBC labs (which includes blood labs looking for signs of infection or anemia) looks better with the hemoglobin improving into the normal range.  -Your Vitamin B12 levels is in a good range, so we'll want to talk more about your toe and finger sensations more at your next appointment.    Please let me know if you have any questions.  Best,   Lev Tristan MD

## 2023-03-30 ENCOUNTER — VIRTUAL VISIT (OUTPATIENT)
Dept: PHARMACY | Facility: CLINIC | Age: 83
End: 2023-03-30
Payer: COMMERCIAL

## 2023-03-30 DIAGNOSIS — I50.32 CHRONIC HEART FAILURE WITH PRESERVED EJECTION FRACTION (H): Primary | ICD-10-CM

## 2023-03-30 DIAGNOSIS — I48.0 PAROXYSMAL ATRIAL FIBRILLATION (H): ICD-10-CM

## 2023-03-30 DIAGNOSIS — I10 ESSENTIAL HYPERTENSION WITH GOAL BLOOD PRESSURE LESS THAN 140/90: ICD-10-CM

## 2023-03-30 DIAGNOSIS — E11.65 TYPE 2 DIABETES MELLITUS WITH HYPERGLYCEMIA, WITH LONG-TERM CURRENT USE OF INSULIN (H): ICD-10-CM

## 2023-03-30 DIAGNOSIS — Z79.4 TYPE 2 DIABETES MELLITUS WITH HYPERGLYCEMIA, WITH LONG-TERM CURRENT USE OF INSULIN (H): ICD-10-CM

## 2023-03-30 PROCEDURE — 99207 PR NO CHARGE LOS: CPT | Mod: VID | Performed by: PHARMACIST

## 2023-03-30 NOTE — PROGRESS NOTES
Medication Therapy Management (MTM) Encounter    ASSESSMENT:                            Medication Adherence/Access: No issues identified    Covid-19 infection: Symptoms are improving.    HFpEF/AFib/Hypertension: She meets with cardiology tomorrow.  Asked her to discuss spironolactone with them.  Also asked her to monitor her blood pressure today and tomorrow prior to her appointment so we have a better idea of where her blood pressure is.  Also asked her to weigh herself.    Type 2 Diabetes: Per her report her blood sugars are still at goal with minimal use of Humalog.  Since her fastings are largely below 130 will not restart Basaglar at this time.  We will continue Ozempic and as needed Humalog per sliding scale as she has been doing.      PLAN:                            1.  Discussed restarting spironolactone with cardiology when you see them tomorrow.  2.  Try monitoring your blood pressure today and tomorrow morning before your appointment with cardiology.  Have those readings available during your appointment.  It would also be good if you could weigh yourself to find out if you are lower than your clinic weight from last week.  3.  Continue your diabetes medications as you have been (Ozempic 1 mg once weekly and Humalog 1 unit for every 50 mg/dL above 150 mg/dL). Do not restart Basaglar at this time.  If you start to see your blood sugars in the morning when you wake up rise above 130 consistently let me know.    Follow-up: 2-4 weeks over [facility] to check on your blood sugars and how you are doing. Please call sooner if your blood sugars are going up. We need to keep your A1c under 8% so your hip surgery isn't postponed.     SUBJECTIVE/OBJECTIVE:                          Betty Tee is a 82 year old female contacted via secure video for a follow-up visit.  Today's visit is a follow-up MTM visit from 3/16/23     Reason for visit: follow-up on blood sugars, med changes from recent hospitalization.     Allergies/ADRs: Reviewed in chart  Past Medical History: Reviewed in chart  Tobacco: She reports that she quit smoking about 11 years ago. Her smoking use included cigarettes. She started smoking about 53 years ago. She smoked an average of .5 packs per day. She has never used smokeless tobacco.  Alcohol: not currently using      Medication Adherence/Access: no issues reported    Covid-19 infection: Since her visit at the clinic last week she became infected with COVID-19.  She reports she is recovering well.  A little bit of a cough but no issues breathing.  No side effects or concerns with molnupiravir (tx now complete)     HFpEF/AFib/Hypertension: Current medications include aspirin 81 mg daily, clopidogrel 75 mg daily, metoprolol XL 50 mg twice daily, torsemide 40 mg by mouth every morning and 20mg in the afternoon and potassium 40 mEq in the morning and 20 mEq in the evening. Patient reports no current medication side effects.   She has not yet restarted spironolactone.  Her leg swelling has been minimal and feels like it is under control.  She feels like she continues to lose weight although she has not been monitoring daily.  She has not been checking her blood pressure at home and is unable to give me any readings today.     BP Readings from Last 6 Encounters:   03/21/23 116/72   03/20/23 139/57   03/09/23 (!) 145/57   01/13/23 130/63   12/16/22 138/77   12/02/22 126/78     Type 2 Diabetes:  Currently taking Ozempic 1 mg weekly and Humalog 1 unit per 50 mg/dL above 150 mg/dL. (has used 2 units of novolog a few times - 2-3 times since her visit with Dr. Bran last week).  Patient is not experiencing side effects.  Remains off Basaglar.   Blood sugar monitoring: 3 time(s) daily. Ranges (patient reported): 121, 134, today, all others all under 150 except for those 2-3 excursions above that were related to the types of food she was eating.  Denies s/sx of hypo/hyperglycemia.   Diet/Exercise: Still eating  pretty minimally as she is recovering from norovirus and now COVID-19.  Just has not had much of an appetite.  Not really back to baseline physical activity for the same reasons.    Note there are a few outstanding items from previous MTM visits such as switching her to an IV bisphosphonate and IV iron rather than oral formulations that are likely leading to gastric upset and contributing to her history of GI bleeding.  She is too overwhelmed to tackle these things right now.  ---------------  I spent 21 minutes with this patient today. All changes were made via collaborative practice agreement with Ilene Tristan MD. A copy of the visit note was provided to the patient's provider(s).    A summary of these recommendations was sent via [employer].    Sabrina Meek PharmD, JAMES, BCACP  MTM Pharmacist, [facility]     Telemedicine Visit Details  Type of service:  Video Conference via [employer]  Start Time: 11:02 AM  End Time: 11:23 AM     Medication Therapy Recommendations  No medication therapy recommendations to display

## 2023-03-31 ENCOUNTER — VIRTUAL VISIT (OUTPATIENT)
Dept: CARDIOLOGY | Facility: CLINIC | Age: 83
End: 2023-03-31
Attending: INTERNAL MEDICINE
Payer: MEDICARE

## 2023-03-31 ENCOUNTER — LAB (OUTPATIENT)
Dept: LAB | Facility: CLINIC | Age: 83
End: 2023-03-31
Payer: MEDICARE

## 2023-03-31 DIAGNOSIS — Z95.818 PRESENCE OF WATCHMAN LEFT ATRIAL APPENDAGE CLOSURE DEVICE: Primary | ICD-10-CM

## 2023-03-31 DIAGNOSIS — I50.32 CHRONIC HEART FAILURE WITH PRESERVED EJECTION FRACTION (H): ICD-10-CM

## 2023-03-31 DIAGNOSIS — R35.0 URINARY FREQUENCY: ICD-10-CM

## 2023-03-31 DIAGNOSIS — I48.21 PERMANENT ATRIAL FIBRILLATION (H): ICD-10-CM

## 2023-03-31 DIAGNOSIS — Z87.19 HISTORY OF GI BLEED: ICD-10-CM

## 2023-03-31 LAB
ALBUMIN UR-MCNC: NEGATIVE MG/DL
ANION GAP SERPL CALCULATED.3IONS-SCNC: 11 MMOL/L (ref 7–15)
APPEARANCE UR: CLEAR
BILIRUB UR QL STRIP: NEGATIVE
BUN SERPL-MCNC: 18.4 MG/DL (ref 8–23)
CALCIUM SERPL-MCNC: 9.5 MG/DL (ref 8.8–10.2)
CHLORIDE SERPL-SCNC: 101 MMOL/L (ref 98–107)
COLOR UR AUTO: ABNORMAL
CREAT SERPL-MCNC: 1.03 MG/DL (ref 0.51–0.95)
DEPRECATED HCO3 PLAS-SCNC: 27 MMOL/L (ref 22–29)
GFR SERPL CREATININE-BSD FRML MDRD: 54 ML/MIN/1.73M2
GLUCOSE SERPL-MCNC: 179 MG/DL (ref 70–99)
GLUCOSE UR STRIP-MCNC: NEGATIVE MG/DL
HGB BLD-MCNC: 12.5 G/DL (ref 11.7–15.7)
HGB UR QL STRIP: ABNORMAL
HYALINE CASTS: 1 /LPF
KETONES UR STRIP-MCNC: NEGATIVE MG/DL
LEUKOCYTE ESTERASE UR QL STRIP: ABNORMAL
NITRATE UR QL: NEGATIVE
PH UR STRIP: 6 [PH] (ref 5–7)
POTASSIUM SERPL-SCNC: 3.6 MMOL/L (ref 3.4–5.3)
RBC URINE: 15 /HPF
SODIUM SERPL-SCNC: 139 MMOL/L (ref 136–145)
SP GR UR STRIP: 1.01 (ref 1–1.03)
SQUAMOUS EPITHELIAL: 1 /HPF
UROBILINOGEN UR STRIP-MCNC: NORMAL MG/DL
WBC URINE: 7 /HPF

## 2023-03-31 PROCEDURE — 99214 OFFICE O/P EST MOD 30 MIN: CPT | Mod: VID | Performed by: NURSE PRACTITIONER

## 2023-03-31 PROCEDURE — 85018 HEMOGLOBIN: CPT | Performed by: PATHOLOGY

## 2023-03-31 PROCEDURE — 81001 URINALYSIS AUTO W/SCOPE: CPT | Performed by: PATHOLOGY

## 2023-03-31 PROCEDURE — 36415 COLL VENOUS BLD VENIPUNCTURE: CPT | Performed by: PATHOLOGY

## 2023-03-31 PROCEDURE — G0463 HOSPITAL OUTPT CLINIC VISIT: HCPCS | Mod: PN,GT | Performed by: NURSE PRACTITIONER

## 2023-03-31 PROCEDURE — 80048 BASIC METABOLIC PNL TOTAL CA: CPT | Performed by: PATHOLOGY

## 2023-03-31 NOTE — LETTER
3/31/2023      RE: Betty Tee  [address]  [facility] [address]       Dear Colleague,    Thank you for the opportunity to participate in the care of your patient, Betty Tee, at the [facility] [place] at [facility]. Please see a copy of my visit note below.    Virtual Visit Details    Type of service:  Video Visit   Video Start Time: 11:35 AM  Video End Time:11:57 AM    Originating Location (pt. Location): Home    Distant Location (provider location):  On-site  Platform used for Video Visit: [facility] HEART CARE  CARDIOVASCULAR DIVISION    STRUCTURAL CLINIC RETURN VISIT    PRIMARY CARDIOLOGIST: Dr. Rosa      PERTINENT CLINICAL HISTORY:     Betty Tee is a very pleasant 82 year old female who presents for 6 month Watchman follow-up. 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 Her post procedure VERONICA showed well seated closure device without significant leak or evidence of pericardial effusion. She was discharged on Xarelto and ASA. She was readmitted with anemia and rectal bleeding, Xarelto stopped and ASA continued. She has since been on DAPT for 4.5 months without recurrent bleeding.     She presents with her friend Nghia via video visit. Betty says she was hospitalized with Norovirus a few weeks ago. She was dehydrated, received IVF and diuretics were held. Her torsemide and potassium were restarted 3/16 at her usual 40 mg a.m/30 mg p.m. dose. Repeat labs showed slight increase in creatinine though within normal range. Last week developed cold/flu symptoms and was diagnosed with COVID. She is  still recovering. Her weight is down from 180 to 165 lbs. She continues to lose weight, doesn't have the best appetite. She feels as though her fluid status is stable, doesn't feel particularly dehydrated or hypervolemic, has been maintaining oral hydration. She otherwise denies exertional chest pain, shortness of breath, orthopnea, PND, leg swelling, weight gain, palpitations, lightheadedness or syncope. Activity is mainly limited by severe right hip pain. She is scheduled to undergo hip replacement May 31st. Home blood pressure have been well controlled in the 1 teens. They are wondering if she should restart her spironolactone.      PAST MEDICAL HISTORY:     Past Medical History:   Diagnosis Date    Alcohol abuse, in remission     Allergic rhinitis, cause unspecified     allegra helps when she takes it    Antiplatelet or antithrombotic long-term use     Atrial fibrillation (H)     in hosp in 11/11 after surgery w/ fluid overload    Cardiomegaly     LVH on stress echo- cardiac w/u at [facility] ER- neg CT scan for PE, neg stress echo in 8/06    Chest pain, unspecified     Congestive heart failure (H)     Depressive disorder     Diabetes (H)     Disorder of bone and cartilage, unspecified     osteopenia (had been on prempro), improved on 6/06 dexa, stable dexa 11/10    Diverticulosis of colon (without mention of hemorrhage)     last episode yrs ago    Essential hypertension, benign     Follicular bronchiolitis (H)     associated with Sjogrens, dx by chest CT showing mosaic attenuation and air trapping    Gastro-oesophageal reflux disease     ILD (interstitial lung disease) (H)     associated with Sjogrens, also has mildly elevated IgG4, first noted on chest CT 2015 (mild changes) and also has small airways disease; ILD improved on follow up chest CT 2018.    Insomnia, unspecified     weaned off clonazepam    Irregular heart beat     Lumbago 07/2009    MRI with NEAL, now seeing Dr. Cain for sciatic sx's    Major  depressive disorder, recurrent episode, moderate (H)     Obstructive sleep apnea     uses cpap    Osteoarthrosis, unspecified whether generalized or localized, unspecified site     Sjogren's syndrome (H)     + RG and SSA and lip bx    Sleep apnea     uses a cpap machine    Tobacco use disorder     chantix in 9/07, started again in 6/08, working      PAST SURGICAL HISTORY:     Past Surgical History:   Procedure Laterality Date    APPENDECTOMY      BACK SURGERY  1962    BACK SURGERY  1962    BIOPSY BREAST  09/27/2002    Biopsy Left Breast    BIOPSY BREAST Left 09/27/2002    BREAST SURGERY      CARDIAC SURGERY      CARDIAC SURGERY      CHOLECYSTECTOMY  1990's?    CHOLECYSTECTOMY      COLECTOMY LEFT  11/07/2011    Procedure:COLECTOMY LEFT; Laparoscopic mobilization of splenic flexture, sigmoid colectomy, coloprotoscopy, loop illeostomy; Surgeon:CK SIDDIQI; Location:[facility] OR    COLECTOMY LEFT  11/07/2011    COLONOSCOPY  1990,s    COLONOSCOPY N/A 5/3/2022    Procedure: COLONOSCOPY;  Surgeon: Guru Winsome Dias MD;  [facility]: [facility]    CV LEFT ATRIAL APPENDAGE CLOSURE N/A 9/26/2022    Procedure: Left Atrial Appendage Closure;  Surgeon: Uzair Crews MD;  Location:  HEART CARDIAC CATH LAB    ENT SURGERY      EYE SURGERY  2012    FLEXIBLE SIGMOIDOSCOPY  11/03/2011    HYSTERECTOMY TOTAL ABDOMINAL, BILATERAL SALPINGO-OOPHORECTOMY, COMBINED  11/07/2011    Procedure:COMBINED HYSTERECTOMY TOTAL ABDOMINAL, BILATERAL SALPINGO-OOPHORECTOMY; total abdominal hysterectomy, bilateral salpingo-oophorectomy; Surgeon:ALETA MANUEL; Location:[facility] OR    HYSTERECTOMY TOTAL ABDOMINAL, BILATERAL SALPINGO-OOPHORECTOMY, COMBINED  11/07/2011    ILEOSTOMY  02/01/2012    takedown loop ileostomy     INSERT STENT URETER  11/07/2011    Procedure:INSERT STENT URETER; Placement of Bilateral Ureteral Stents ; Surgeon:PRANEETH BRYANT; Location:[facility] OR    SIGMOIDECTOMY  02/01/2012    Dr. Siddiqi, Sigmoidectomy for diverticular  abscess, iliostomy afterwards until repair    SIGMOIDOSCOPY FLEXIBLE  11/03/2011    Procedure:SIGMOIDOSCOPY FLEXIBLE; Flexible Sigmoidoscopy; Surgeon:CK CASTANEDA; Location:[facility] OR    TAKEDOWN ILEOSTOMY  02/01/2012    Procedure:TAKEDOWN ILEOSTOMY; Takedown Loop Ileostomy ; Surgeon:CK CASTANEDA; Location:[facility] OR    URETERAL STENT PLACEMENT  11/07/2011    [facility] APPENDECTOMY  1970's?    [facility] NONSPECIFIC PROCEDURE  11/2005    exploratory abd lap, adhesions, resolved RLQ pain, diverticulitis episodes      CURRENT MEDICATIONS:     Current Outpatient Medications   Medication Sig Dispense Refill    ACCU-CHEK SHANNON PLUS test strip USE TO TEST BLOOD SUGARS 3 TIMES DAILY 300 strip 5    acetaminophen (TYLENOL) 500 MG tablet Take 1,000 mg by mouth every 8 hours as needed (max 6 tablets/24 hours, 2 tablets/dose)      acyclovir (ZOVIRAX) 400 MG tablet Take 1 tablet (400 mg) by mouth 3 times daily as needed For a couple days 15 tablet 2    acyclovir (ZOVIRAX) 5 % external ointment Apply topically as needed (6 times day PRN for outbreaks) As needed for outbreaks 15 g 3    albuterol (PROAIR HFA/PROVENTIL HFA/VENTOLIN HFA) 108 (90 Base) MCG/ACT inhaler Inhale 2 puffs into the lungs every 6 hours 18 g 11    alendronate (FOSAMAX) 70 MG tablet Take 1 tablet (70 mg) by mouth every 7 days 4 tablet 1    allopurinol (ZYLOPRIM) 100 MG tablet Take 1 tablet (100 mg) by mouth daily 90 tablet 1    anakinra (KINERET) 100 MG/0.67ML SOSY injection 100 mg every day x 3 days as needed for flare ups 6.7 mL 3    aspirin (ASA) 81 MG EC tablet Take 1 tablet (81 mg) by mouth daily 90 tablet 1    atorvastatin (LIPITOR) 10 MG tablet TAKE 1/2 TABLET BY MOUTH EVERY DAY 45 tablet 0    azithromycin (ZITHROMAX) 250 MG tablet TAKE 1 TABLET BY MOUTH EVERY DAY 30 tablet 5    BD PEN NEEDLE JANE 2ND GEN 32G X 4 MM miscellaneous USE TO TEST 4 TIMES A  each 1    blood glucose (NO BRAND SPECIFIED) lancets standard Use to test blood sugar 3 times daily or as  directed 100 lancet 3    cevimeline (EVOXAC) 30 MG capsule Take 1 capsule (30 mg) by mouth 3 times daily (Patient taking differently: Take 30 mg by mouth 3 times daily as needed) 90 capsule 11    Cholecalciferol (VITAMIN D3) 50 MCG (2000 UT) CAPS TAKE 100 MCG BY MOUTH DAILY (TAKE 2 TABLET (50 MCG) BY MOUTH DAILY - ORAL) 180 capsule 0    clopidogrel (PLAVIX) 75 MG tablet Take 1 tablet (75 mg) by mouth daily 90 tablet 1    COMPOUNDED NON-CONTROLLED SUBSTANCE (CMPD RX) - PHARMACY TO MIX COMPOUNDED MEDICATION Estriol 1 mg/g Apply small amount to finger and apply to inside vagina daily for 2 weeks then twice weekly Route: vaginally Dispense 30 grams 11 refills 30 g 11    cyanocobalamin (VITAMIN B-12) 1000 MCG tablet Take 1 tablet (1,000 mcg) by mouth daily (Patient taking differently: Take 1,000 mcg by mouth three times a week) 30 tablet 1    escitalopram (LEXAPRO) 20 MG tablet TAKE 1 TABLET BY MOUTH EVERY DAY 90 tablet 0    ferrous gluconate (FERGON) 324 (38 Fe) MG tablet Take 1 tablet (324 mg) by mouth daily (with breakfast) 90 tablet 1    fluticasone (FLONASE) 50 MCG/ACT nasal spray SPRAY 1-2 SPRAYS INTO BOTH NOSTRILS DAILY AS NEEDED FOR ALLERGIES 16 mL 11    fluticasone-vilanterol (BREO ELLIPTA) 100-25 MCG/INH inhaler Inhale 1 puff into the lungs daily 1 each 11    hydroxychloroquine (PLAQUENIL) 200 MG tablet Take 2 tablets (400 mg) by mouth daily Get annual eye exams for hydroxychloroquine (Plaquenil) monitoring and fax to [phone] 180 tablet 3    insulin lispro (HUMALOG KWIKPEN) 100 UNIT/ML (1 unit dial) KWIKPEN Inject 8 Units Subcutaneous 3 times daily (before meals)      ketoconazole (NIZORAL) 2 % external cream Apply topically 2 times daily as needed for itching 60 g 1    KLOR-CON 20 MEQ CR tablet TAKE 2 TABLETS (40 MEQ) BY MOUTH EVERY MORNING AND 1 TABLET (20 MEQ) EVERY EVENING. 270 tablet 3    lidocaine (LIDODERM) 5 % patch APPLY PATCH TO PAINFUL AREA FOR UP TO 12 H WITHIN A 24 H PERIOD. REMOVE AFTER 12  HOURS. (Patient taking differently: Apply patch to painful area for up to 12 h within a 24 h period.  Remove after 12 hours.) 30 patch 2    loratadine (CLARITIN) 10 MG tablet TAKE 1 TABLET BY MOUTH EVERY DAY 90 tablet 3    methocarbamol (ROBAXIN) 750 MG tablet Take 1 tablet (750 mg) by mouth 3 times daily as needed for muscle spasms 90 tablet 3    metoprolol succinate ER (TOPROL XL) 50 MG 24 hr tablet Take 1 tablet (50 mg) by mouth 2 times daily 90 tablet 3    montelukast (SINGULAIR) 10 MG tablet TAKE 1 TABLET BY MOUTH EVERYDAY AT BEDTIME 90 tablet 0    oxyCODONE-acetaminophen (PERCOCET) 7.5-325 MG per tablet Take 1 tablet by mouth 2 times daily as needed for severe pain (7-10) With at least 4 hours between doses. Fill and start 2/17/23 60 tablet 0    OZEMPIC, 1 MG/DOSE, 4 MG/3ML pen INJECT 1 MG SUBCUTANEOUS ONCE A WEEK 3 mL 1    pantoprazole (PROTONIX) 40 MG EC tablet Take 1 tablet (40 mg) by mouth daily (replaces famotidine- should stop taking famotidine) 90 tablet 3    predniSONE (DELTASONE) 5 MG tablet Take 1 tablet (5 mg) by mouth daily 90 tablet 1    spironolactone (ALDACTONE) 25 MG tablet Take 2 tablets (50 mg) by mouth daily (Patient not taking: Reported on 3/16/2023) 180 tablet 3    torsemide (DEMADEX) 20 MG tablet TAKE 2 TABLETS (40 MG) BY MOUTH EVERY MORNING AND 1 TABLET (20 MG) DAILY AT 2 PM. TAKE THE AFTERNOON DOSE WITH AN EXTRA 10 MG TAB FOR A TOTAL OF 30 MG IN THE AFTERNOON 270 tablet 3    traZODone (DESYREL) 50 MG tablet TAKE 3 TABLETS (150 MG) BY MOUTH NIGHTLY AS NEEDED FOR SLEEP 270 tablet 0        ALLERGIES:     Allergies   Allergen Reactions    Amoxicillin-Pot Clavulanate     Augmentin Nausea and Vomiting    Codeine Nausea and Vomiting     PN: LW Reaction: HIVES    Penicillins Nausea and Vomiting     PN: LW Reaction: GI Upset    Phenobarbital Itching    Seasonal Allergies         FAMILY HISTORY:     Family History   Problem Relation Age of Onset    C.A.D. Mother 63        MI- first at age 63     Heart Disease Mother     Hypertension Mother     Cerebrovascular Disease Mother     Hyperlipidemia Mother     Coronary Artery Disease Mother         MI-first at age 63    Other - See Comments Mother         cerebrovascular disease     Alcohol/Drug Father     Alzheimer Disease Father     Dementia Father     Hypertension Father     Hyperlipidemia Father     Substance Abuse Father     Diabetes Sister     Hypertension Sister     Hyperlipidemia Sister     Substance Abuse Sister     Asthma Sister     C.A.D. Sister 52        Minor MI- age 50's    Heart Disease Sister     Hypertension Sister     Hypertension Sister     Cancer - colorectal Sister 48        Late 40's early 50's    Gastrointestinal Disease Sister         Diverticulitis    Lipids Sister     Lipids Sister     Diabetes Sister     Heart Disease Sister         CHF    Cancer Sister         lung, smoker    Substance Abuse Sister     Asthma Sister     Cancer Sister     Coronary Artery Disease Sister         minor MI-age 50's    Heart Disease Sister     Hypertension Sister     Hypertension Sister     Colon Cancer Sister     Diverticulitis Sister     Lung Cancer Sister     Heart Disease Sister         CHF    Diabetes Sister     Asthma Sister     Hypertension Brother     Prostate Cancer Brother 74        Dx'd age 74    Gastrointestinal Disease Brother         Diverticulitis    Parkinsonism Brother     Substance Abuse Brother     Prostate Cancer Brother     Hyperlipidemia Brother     Hypertension Brother     Prostate Cancer Brother     Diverticulitis Brother     Parkinsonism Brother     Breast Cancer Daughter     Breast Cancer Daughter     Diabetes Other     Hypertension Other     Anesthesia Reaction No family hx of     Deep Vein Thrombosis (DVT) No family hx of         SOCIAL HISTORY:     Social History     Socioeconomic History    Marital status: Single    Number of children: 0    Years of education: Ed Spec De   Occupational History    Occupation: Professor      Employer: [employer]     Comment: [employer]- Education   Tobacco Use    Smoking status: Former Smoker     Packs/day: 0.50     Years: 10.00     Pack years: 5.00     Types: Cigarettes     Quit date: 2011     Years since quittin.1    Smokeless tobacco: Never Used    Tobacco comment:  ppd   Substance and Sexual Activity    Alcohol use: No     Alcohol/week: 0.0 standard drinks     Comment: In recovery beginning     Drug use: No    Sexual activity: Never   Other Topics Concern    Parent/sibling w/ CABG, MI or angioplasty before 65F 55M? Yes   Social History Narrative                    REVIEW OF SYSTEMS:     Constitutional: No fevers or chills  Skin: No new rash or itching  Eyes: No acute change in vision  Ears/Nose/Throat: No purulent rhinorrhea, new hearing loss, or new vertigo  Respiratory: No cough or hemoptysis  Cardiovascular: See HPI  Gastrointestinal: No change in appetite, vomiting, hematemesis or diarrhea  Genitourinary: No dysuria or hematuria  Musculoskeletal: No new back pain, neck pain or muscle pain  Neurologic: No new headaches, focal weakness or behavior changes  Psychiatric: No hallucinations, excessive alcohol consumption or illegal drug usage  Hematologic/Lymphatic/Immunologic: No bleeding, chills, fever, night sweats or weight loss  Endocrine: No new cold intolerance, heat intolerance, polyphagia, polydipsia or polyuria      PHYSICAL EXAMINATION:     There were no vitals taken for this visit. due to video visit.     GENERAL: No acute distress.  HEENT: EOMI. Sclerae white, not injected. Nares clear. Pharynx without erythema or exudate.   Neck:. No jugular venous distension.   Lungs: Non labored breathing   Extremities: No clubbing, cyanosis, or edema.   Neurologic: Alert and oriented to person/place/time, normal speech and affect. No focal deficits.  Skin: No petechiae, purpura or rash.     LABORATORY DATA:     LIPID RESULTS:  Lab Results    Component Value Date    CHOL 82 03/14/2023    CHOL 115 10/20/2020    HDL 42 (L) 03/14/2023    HDL 71 10/20/2020    LDL 22 03/14/2023    LDL 19 10/20/2020    TRIG 90 03/14/2023    TRIG 125 10/20/2020    CHOLHDLRATIO 2.5 08/06/2014       LIVER ENZYME RESULTS:  Lab Results   Component Value Date    AST 24 03/21/2023    AST 17 06/04/2021    ALT 17 03/21/2023    ALT 25 06/04/2021       CBC RESULTS:  Lab Results   Component Value Date    WBC 6.1 03/21/2023    WBC 8.6 06/04/2021    RBC 4.51 03/21/2023    RBC 4.46 06/04/2021    HGB 13.0 03/21/2023    HGB 13.4 06/04/2021    HCT 40.1 03/21/2023    HCT 42.1 06/04/2021    MCV 89 03/21/2023    MCV 94 06/04/2021    MCH 28.8 03/21/2023    MCH 30.0 06/04/2021    MCHC 32.4 03/21/2023    MCHC 31.8 06/04/2021    RDW 16.1 (H) 03/21/2023    RDW 15.5 (H) 06/04/2021     03/21/2023     06/04/2021       BMP RESULTS:  Lab Results   Component Value Date     03/21/2023     06/04/2021    POTASSIUM 4.3 03/21/2023    POTASSIUM 4.1 08/23/2022    POTASSIUM 4.0 06/04/2021    CHLORIDE 100 03/21/2023    CHLORIDE 107 08/23/2022    CHLORIDE 106 06/04/2021    CO2 24 03/21/2023    CO2 22 08/23/2022    CO2 25 06/04/2021    ANIONGAP 13 03/21/2023    ANIONGAP 8 08/23/2022    ANIONGAP 6 06/04/2021     (H) 03/21/2023     (H) 03/09/2023     (H) 08/23/2022     (H) 06/04/2021    BUN 18.2 03/21/2023    BUN 17 08/23/2022    BUN 14 06/04/2021    CR 1.01 (H) 03/21/2023    CR 1.11 (H) 06/04/2021    GFRESTIMATED 55 (L) 03/21/2023    GFRESTIMATED 47 (L) 06/04/2021    GFRESTBLACK 54 (L) 06/04/2021    CLAUDY 9.7 03/21/2023    CLAUDY 8.8 06/04/2021        A1C RESULTS:  Lab Results   Component Value Date    A1C 7.3 (H) 03/14/2023    A1C 7.1 (H) 06/04/2021       INR RESULTS:  Lab Results   Component Value Date    INR 1.43 (H) 11/10/2022    INR 1.37 (H) 09/23/2022    INR 1.36 (H) 03/03/2020    INR 2.37 (H) 02/14/2019          PROCEDURES & FURTHER ASSESSMENTS:   EKG  3/7/23:    CT heart 11/10/22:    IMPRESSION:  1.  There is a Watchman 24 mm closure device in the left atrial  appendage. It is well-seated without evidence of thrombus on the left  atrial side of the device.  2.  There is no contrast opacification of the left atrial appendage.  3.  Please review the separate Radiology report from the original  study site and date for incidental noncardiac findings. This report  will follow separately.     FINDINGS:   1.  There is a Watchman 24 mm closure device in the left atrial  appendage. It is well-seated.   2.  There is no thrombus on the left atrial side of the closure device  on early or delayed imaging.   3.  There is no contrast opacification of the left atrial appendage,  implying complete endothelialization of the occlusion device.   4.  Normal pulmonary venous anatomy with all pulmonary veins draining  into the left atrium.  5.  Coronary images are non-diagnostic for stenosis/atherosclerosis  due to cardiac motion artifact.   6.  The visualized aortic root, ascending aorta and descending  thoracic aorta are normal.  7.    Please review the separate Radiology report from the original study  site and date for incidental noncardiac findings. This report will  follow separately.     CARLTON LANCE MD           CLINICAL IMPRESSION:     Betty Tee is a very pleasant 82 year old female who presents for 6 month Watchman follow-up.     1. Paroxysmal a-fib with intolerance to AC  2. 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 Post-procedure VERONICA showed no evidence of benigno-device leak and no pericardial effusion. Her 45 day CT showed well seated LAAO without leak or thrombus, no contrast opacification in the ZACHARY suggesting complete endothelialization. Her anticoagulation was held and 4.5 months of DAPT completed.   - Discontinue Plavix now that 6 months post LAAO   - Continue ASA 81 mg daily lifelong   - No  longer requires SBE prophylaxis given that she is 6 months post LAAO     3. Chronic diastolic heart failure:  4. Mild CKD  4. HTN  5. HLD  6. ANGELICA  Recent admission for dehydration related norovirus, spironolactone and torsemide were held. Overall has lost 15 lbs since recent admission, continues to lose weight due to poor appetite though is maintaining hydration. Torsemide restarted 3/16 and repeat BMP 3/21 with slight increase in creatinine but appears to be at baseline.  Suspect euvolemic to hypovolemic - difficult to tell by video.   - Continue current dose of torsemide for now - repeat BMP today  - If kidney function stable will continue torsemide, if worsening decrease torsemide  - BP well controlled 1 teens, continue to hold spironolactone, discuss at follow-up with Dr. Rosa 4/13  - Continue atorvastatin for cholesterol management  - Continue CPAP     7. Type II DM:   - Insulin dependent with novolog and lantus. Also on Ozempic. Continue current regimen.      8. Severe right hip pain 2/2 OA: Hip replacement schedule 5/31/23     9. PMR: Continue daily prednisone   10. Sjogrens: Continue Cevimeline for dry mouth  11. Gout: PRN anakinra for gout     RTC: Graduated from structural clinic, patient can continue to follow with LIZY Lawson, CNP  [facility]       CC  Patient Care Team:  Ilene Tristan MD as PCP - General  Ilene Tristan MD as Assigned PCP  Kirill Polk MD as MD (Otolaryngology)  Aubrey Hurtado MD as MD (Cardiology)  Anderson Perez MD as MD (Clinical Cardiac Electrophysiology)  Radha Rosa MD as MD (Cardiology)  Kiesha Elias APRN CNP as Nurse Practitioner (Cardiology)  Beatriz Blanco, RN as Nurse Coordinator (Cardiology)  Carmenza Stringer MD as MD (Rheumatology)  Danay Payne, JASON as Specialty Care Coordinator (Cardiology)  Sabrina Meek, [facility] as Pharmacist (Pharmacist)  Flor Velasquez  Zaki RN as Specialty Care Coordinator (Cardiology)  Zulma Lopez MD as MD (Rheumatology)  Kami Lang MD as MD (Ophthalmology)  Reina Betancur MD as MD (Internal Medicine)  Zulma Lopez MD as Referring Physician (Rheumatology)  Zulma Lopez MD as Assigned Rheumatology Provider  Maryjane Tillman MD as Assigned Pulmonology Provider  Kelechi Santana MD as Assigned Surgical Provider  Thomas Shannon MD as Assigned Musculoskeletal Provider  Sabrina Meek, [facility] as Assigned MTM Pharmacist  Guru Winsome Dias MD as Assigned Gastroenterology Provider  Marina Soler NP as Assigned Heart and Vascular Provider  Suzanne Whitman, [facility] as Pharmacist (Pharmacist)  Alanna Alba MD as Assigned Pain Medication Provider      Please do not hesitate to contact me if you have any questions/concerns.     Sincerely,     Marina Soler NP

## 2023-03-31 NOTE — NURSING NOTE
Chief Complaint   Patient presents with     Follow Up     Pt states having some questions regarding medications      Is the patient currently in the state of [place]? YES    Visit mode:VIDEO    If the visit is dropped, the patient can be reconnected by: VIDEO VISIT: Text to cell phone: [phone]    Will anyone else be joining the visit? Pts friend Nghia      How would you like to obtain your AVS? MyChart    Are changes needed to the allergy or medication list? NO     Patient declined individual allergy and medication review by support staff because patient denies any changes since echeck-in completion and states all information entered during echeck-in remains accurate.    States medications were also dicussed with pharmacist yest.    Additional pt reported BP reading from last night: 134/73    SOPHIA Christiansne/CMA

## 2023-03-31 NOTE — PATIENT INSTRUCTIONS
"Recommendations from today's MTM visit:                                                      1.  Discussed restarting spironolactone with cardiology when you see them tomorrow.  2.  Try monitoring your blood pressure today and tomorrow morning before your appointment with cardiology.  Have those readings available during your appointment.  It would also be good if you could weigh yourself to find out if you are lower than your clinic weight from last week.  3.  Continue your diabetes medications as you have been (Ozempic 1 mg once weekly and Humalog 1 unit for every 50 mg/dL above 150 mg/dL). Do not restart Basaglar at this time.  If you start to see your blood sugars in the morning when you wake up rise above 130 consistently let me know.    Follow-up: 2-4 weeks over [facility] to check on your blood sugars and how you are doing. Please call sooner if your blood sugars are going up. We need to keep your A1c under 8% so your hip surgery isn't postponed.     It was great speaking with you today.  I value your experience and would be very thankful for your time in providing feedback in our clinic survey. In the next few days, you may receive an email or text message from [email or web address] with a link to a survey related to your  clinical pharmacist.\"     To schedule another MTM appointment, please call the clinic directly or you may call the MTM scheduling line at [phone] or toll-free at [phone].     My Clinical Pharmacist's contact information:                                                      Please feel free to contact me with any questions or concerns you have.      Sabrina Meek, Pharm.D., M.B.A., [facility]  MTM Pharmacist, [facility]  Pager: [phone]  Email: [email or web address]      "

## 2023-03-31 NOTE — PATIENT INSTRUCTIONS
You were seen today in the Structural Heart Clinic at the [facility].    Cardiology provider you saw during your visit: Marina Soler NP    Instructions:   Stop Plavix given 6 months post Watchman   Continue aspirin 81 mg daily lifelong  Continue to hold spironolactone  Continue to current dose of torsemide and have kidney function drawn today to ensure you are not getting dehydrated   No longer require antibiotics before the dentist for your watchman device  You have graduated from structural clinic, continue to follow with Dr. Rosa for management of your chronic cardiovascular conditions      Questions and scheduling:   First call: Structural Heart  Stefania Torres [phone]    General scheduling line: [phone].   First press #1 for the University and then press #3 for Medical Questions to reach the Cardiology triage nurse.     On Call Cardiologist for after hours or on weekends: [phone], press option #4 and ask to speak to the on-call Cardiologist.

## 2023-04-02 DIAGNOSIS — F33.1 MAJOR DEPRESSIVE DISORDER, RECURRENT EPISODE, MODERATE (H): ICD-10-CM

## 2023-04-03 ENCOUNTER — TELEPHONE (OUTPATIENT)
Dept: ORTHOPEDICS | Facility: CLINIC | Age: 83
End: 2023-04-03

## 2023-04-04 RX ORDER — ESCITALOPRAM OXALATE 20 MG/1
TABLET ORAL
Qty: 90 TABLET | Refills: 1 | Status: SHIPPED | OUTPATIENT
Start: 2023-04-04 | End: 2023-09-29

## 2023-04-04 NOTE — TELEPHONE ENCOUNTER
Prescription approved per [facility] Refill Protocol.  Future Appointments 4/4/2023 - 10/1/2023      Date Visit Type Length Department Provider     4/12/2023  1:00 PM ANNUAL WELLNESS 60 min [facility] Ilene Tristan MD    Location Instructions:     [facility] is in [address] of the [facility] at [address] in [place]. The building is at the intersection with [facility] and along [facility]. This is the large, blue, glass building with a sculpture on the roof; please note there is no [place] signage on the exterior. Free lot parking is available.              4/13/2023 12:15 PM LAB 15 min [facility] LABORATORY  LAB    Location Instructions:     Located in the [facility] at [address] [place] [address]. For parking options, enter the [facility] /arrival plaza from [facility] and attendants can assist you based on your needs.  parking is available for those with limited mobility M-F from 7 a.m. to 5 p.m. Due to short staffing, we are unable to offer  to all patients/visitors. Visit [email or web address] for more details.  Self-parking:&nbsp;  West Lot: Located across from the main entrance, this is a convenient option for patients. Enter on [address]. Parking attendants available most hours to assist.&nbsp;     [address] Ramp: Enter at the [facility] SE entrance (one block north of the [facility] main entrance). Do not enter the ramp from [facility] - this entrance is not staffed and is further from the [facility] main entrance.              4/13/2023  1:00 PM RETURN HEART FAILURE 30 min  CARDIOVASCULAR CTR Radha Rosa MD    Location Instructions:     Located in the [facility] at [address] [place] [address]. For parking options, enter the [facility] /arrival plaza from [facility] and attendants can assist you based on your needs.  parking is available for those with limited mobility M-F from  7 a.m. to 5 p.m. Due to short staffing, we are unable to offer  to all patients/visitors. Visit [email or web address] for more details.  Self-parking:&nbsp;  West Lot: Located across from the main entrance, this is a convenient option for patients. Enter on [address]. Parking attendants available most hours to assist.&nbsp;     [address] Ramp: Enter at the [facility] SE entrance (one block north of the [facility] main entrance). Do not enter the ramp from [facility] - this entrance is not staffed and is further from the [facility] main entrance.              5/10/2023 11:00 AM PRE-OPERATIVE 30 min [facility] Ilene Tristan MD    Location Instructions:     [facility] is in [address] of the [facility] at [address] in [place]. The building is at the intersection with [facility] and along [facility]. This is the large, blue, glass building with a sculpture on the roof; please note there is no [employer] signage on the exterior. Free lot parking is available.              5/11/2023 11:30 AM UMP FULL PULMONARY FUNCTION 30 min [facility] PULM FUNC TESTING UC PFL C    Location Instructions:     Located in the Clinics and [facility] Center at [address] [facility] [address]. For parking options, enter the [facility] /arrival plaza from [facility] and attendants can assist you based on your needs.  parking is available for those with limited mobility M-F from 7 a.m. to 5 p.m. Due to short staffing, we are unable to offer  to all patients/visitors. Visit [email or web address] for more details.  Self-parking:&nbsp;  West Lot: Located across from the main entrance, this is a convenient option for patients. Enter on [address]. Parking attendants available most hours to assist.&nbsp;     [address] Ramp: Enter at the [facility] SE entrance (one block north of the [facility] main entrance). Do not enter the ramp from [facility] - this entrance is not staffed and is  further from the [facility] main entrance.              5/11/2023 12:00 PM UMP RETURN INTERSTITIAL LUNG 30 min [facility] LUNG SCIENCE LayneMaryjane MD    Location Instructions:     Located in the [facility] at [address] [place] [address]. For parking options, enter the [facility] /arrival plaza from [facility] and attendants can assist you based on your needs.  parking is available for those with limited mobility M-F from 7 a.m. to 5 p.m. Due to short staffing, we are unable to offer  to all patients/visitors. Visit [email or web address]/[facility] for more details.  Self-parking:&nbsp;  West Lot: Located across from the main entrance, this is a convenient option for patients. Enter on [address]. Parking attendants available most hours to assist.&nbsp;     [address] Ramp: Enter at the [place] SE entrance (one block north of the [facility] main entrance). Do not enter the ramp from [employer] - this entrance is not staffed and is further from the [facility] main entrance.              6/16/2023  1:00 PM POST OP 20 min [facility] ORTHOPEDICS Destinee Panchal PA-C    Location Instructions:     Located in the [facility] at [address] [place] [address]. For parking options, enter the [facility] /arrival plaza from [facility] and attendants can assist you based on your needs.  parking is available for those with limited mobility M-F from 7 a.m. to 5 p.m. Due to short staffing, we are unable to offer  to all patients/visitors. Visit [email or web address] for more details.  Self-parking:&nbsp;  West Lot: Located across from the main entrance, this is a convenient option for patients. Enter on [address]. Parking attendants available most hours to assist.&nbsp;     [address] Ramp: Enter at the [address] SE entrance (one block north of the [facility] main entrance). Do not enter the ramp from [address] - this entrance is not staffed and is further from the [facility] main entrance.               7/13/2023  9:20 AM POST OP 20 min [facility] ORTHOPEDICS Thomas Shannon MD    Location Instructions:     Located in the [facility] at [address] [place] [address]. For parking options, enter the [facility] /arrival plaza from [facility] and attendants can assist you based on your needs.  parking is available for those with limited mobility M-F from 7 a.m. to 5 p.m. Due to short staffing, we are unable to offer  to all patients/visitors. Visit [email or web address] for more details.  Self-parking:&nbsp;  West Lot: Located across from the main entrance, this is a convenient option for patients. Enter on [address]. Parking attendants available most hours to assist.&nbsp;     [address] Ramp: Enter at the [place] SE entrance (one block north of the [facility] main entrance). Do not enter the ramp from [facility] - this entrance is not staffed and is further from the [facility] main entrance.              7/21/2023  2:00 PM RETURN 30 min  ADULT RHEUMATOLOGY Zulma Lopez MD    Location Instructions:     Located in the [facility] at [address] [place] [address]. For parking options, enter the [facility] /arrival plaza from [facility] and attendants can assist you based on your needs.  parking is available for those with limited mobility M-F from 7 a.m. to 5 p.m. Due to short staffing, we are unable to offer  to all patients/visitors. Visit [email or web address] for more details.  Self-parking:&nbsp;  West Lot: Located across from the main entrance, this is a convenient option for patients. Enter on [address]. Parking attendants available most hours to assist.&nbsp;     [address] Ramp: Enter at the [address] SE entrance (one block north of the [facility] main entrance). Do not enter the ramp from [facility] - this entrance is not staffed and is further from the [facility] main entrance.                 Elise RIVERA RN

## 2023-04-06 DIAGNOSIS — I50.32 CHRONIC HEART FAILURE WITH PRESERVED EJECTION FRACTION (H): Primary | ICD-10-CM

## 2023-04-12 ENCOUNTER — OFFICE VISIT (OUTPATIENT)
Dept: FAMILY MEDICINE | Facility: CLINIC | Age: 83
End: 2023-04-12
Payer: MEDICARE

## 2023-04-12 VITALS
RESPIRATION RATE: 16 BRPM | SYSTOLIC BLOOD PRESSURE: 110 MMHG | DIASTOLIC BLOOD PRESSURE: 70 MMHG | HEART RATE: 52 BPM | OXYGEN SATURATION: 95 % | TEMPERATURE: 97.2 F

## 2023-04-12 DIAGNOSIS — M16.11 PRIMARY OSTEOARTHRITIS OF RIGHT HIP: ICD-10-CM

## 2023-04-12 DIAGNOSIS — N39.41 URGE INCONTINENCE OF URINE: ICD-10-CM

## 2023-04-12 DIAGNOSIS — Z79.4 TYPE 2 DIABETES MELLITUS WITH HYPERGLYCEMIA, WITH LONG-TERM CURRENT USE OF INSULIN (H): ICD-10-CM

## 2023-04-12 DIAGNOSIS — Z00.00 ENCOUNTER FOR MEDICARE ANNUAL WELLNESS EXAM: Primary | ICD-10-CM

## 2023-04-12 DIAGNOSIS — I50.32 CHRONIC HEART FAILURE WITH PRESERVED EJECTION FRACTION (H): ICD-10-CM

## 2023-04-12 DIAGNOSIS — G47.33 OSA (OBSTRUCTIVE SLEEP APNEA): ICD-10-CM

## 2023-04-12 DIAGNOSIS — I48.0 PAROXYSMAL ATRIAL FIBRILLATION (H): ICD-10-CM

## 2023-04-12 DIAGNOSIS — E11.65 TYPE 2 DIABETES MELLITUS WITH HYPERGLYCEMIA, WITH LONG-TERM CURRENT USE OF INSULIN (H): ICD-10-CM

## 2023-04-12 DIAGNOSIS — F33.1 MAJOR DEPRESSIVE DISORDER, RECURRENT EPISODE, MODERATE (H): ICD-10-CM

## 2023-04-12 DIAGNOSIS — E78.5 HYPERLIPIDEMIA LDL GOAL <100: ICD-10-CM

## 2023-04-12 DIAGNOSIS — R29.898 FINGER DYSFUNCTION: ICD-10-CM

## 2023-04-12 DIAGNOSIS — I10 ESSENTIAL HYPERTENSION WITH GOAL BLOOD PRESSURE LESS THAN 140/90: ICD-10-CM

## 2023-04-12 DIAGNOSIS — G63 POLYNEUROPATHY ASSOCIATED WITH UNDERLYING DISEASE (H): ICD-10-CM

## 2023-04-12 DIAGNOSIS — M35.02 SJOGREN'S SYNDROME WITH LUNG INVOLVEMENT (H): ICD-10-CM

## 2023-04-12 DIAGNOSIS — K31.819 GAVE (GASTRIC ANTRAL VASCULAR ECTASIA): ICD-10-CM

## 2023-04-12 DIAGNOSIS — N18.32 STAGE 3B CHRONIC KIDNEY DISEASE (H): ICD-10-CM

## 2023-04-12 DIAGNOSIS — M85.89 OSTEOPENIA OF MULTIPLE SITES: ICD-10-CM

## 2023-04-12 DIAGNOSIS — R39.15 URINARY URGENCY: ICD-10-CM

## 2023-04-12 PROCEDURE — 99214 OFFICE O/P EST MOD 30 MIN: CPT | Mod: 25 | Performed by: FAMILY MEDICINE

## 2023-04-12 PROCEDURE — G0439 PPPS, SUBSEQ VISIT: HCPCS | Performed by: FAMILY MEDICINE

## 2023-04-12 PROCEDURE — 99207 PR FOOT EXAM NO CHARGE: CPT | Performed by: FAMILY MEDICINE

## 2023-04-12 ASSESSMENT — ENCOUNTER SYMPTOMS
WEAKNESS: 1
MYALGIAS: 1
BREAST MASS: 0
CONSTIPATION: 1
FREQUENCY: 1
ARTHRALGIAS: 1

## 2023-04-12 ASSESSMENT — PATIENT HEALTH QUESTIONNAIRE - PHQ9
SUM OF ALL RESPONSES TO PHQ QUESTIONS 1-9: 6
10. IF YOU CHECKED OFF ANY PROBLEMS, HOW DIFFICULT HAVE THESE PROBLEMS MADE IT FOR YOU TO DO YOUR WORK, TAKE CARE OF THINGS AT HOME, OR GET ALONG WITH OTHER PEOPLE: SOMEWHAT DIFFICULT
SUM OF ALL RESPONSES TO PHQ QUESTIONS 1-9: 6

## 2023-04-12 ASSESSMENT — ANXIETY QUESTIONNAIRES
8. IF YOU CHECKED OFF ANY PROBLEMS, HOW DIFFICULT HAVE THESE MADE IT FOR YOU TO DO YOUR WORK, TAKE CARE OF THINGS AT HOME, OR GET ALONG WITH OTHER PEOPLE?: SOMEWHAT DIFFICULT
7. FEELING AFRAID AS IF SOMETHING AWFUL MIGHT HAPPEN: NOT AT ALL
2. NOT BEING ABLE TO STOP OR CONTROL WORRYING: NOT AT ALL
1. FEELING NERVOUS, ANXIOUS, OR ON EDGE: NOT AT ALL
IF YOU CHECKED OFF ANY PROBLEMS ON THIS QUESTIONNAIRE, HOW DIFFICULT HAVE THESE PROBLEMS MADE IT FOR YOU TO DO YOUR WORK, TAKE CARE OF THINGS AT HOME, OR GET ALONG WITH OTHER PEOPLE: SOMEWHAT DIFFICULT
7. FEELING AFRAID AS IF SOMETHING AWFUL MIGHT HAPPEN: NOT AT ALL
6. BECOMING EASILY ANNOYED OR IRRITABLE: SEVERAL DAYS
GAD7 TOTAL SCORE: 1
5. BEING SO RESTLESS THAT IT IS HARD TO SIT STILL: NOT AT ALL
GAD7 TOTAL SCORE: 1
3. WORRYING TOO MUCH ABOUT DIFFERENT THINGS: NOT AT ALL
GAD7 TOTAL SCORE: 1
4. TROUBLE RELAXING: NOT AT ALL

## 2023-04-12 ASSESSMENT — PAIN SCALES - GENERAL: PAINLEVEL: NO PAIN (0)

## 2023-04-12 ASSESSMENT — ACTIVITIES OF DAILY LIVING (ADL): CURRENT_FUNCTION: MEDICATION ADMINISTRATION REQUIRES ASSISTANCE

## 2023-04-12 NOTE — PATIENT INSTRUCTIONS
Patient Education   Personalized Prevention Plan  You are due for the preventive services outlined below.  Your care team is available to assist you in scheduling these services.  If you have already completed any of these items, please share that information with your care team to update in your medical record.  Health Maintenance Due   Topic Date Due     Diabetic Foot Exam  10/22/2020     Osteoporosis Screening  08/20/2022     ANNUAL REVIEW OF HM ORDERS  09/14/2022     Eye Exam  04/15/2023       Exercise for a Healthier Heart  You may wonder how you can improve the health of your heart. If you re thinking about exercise, you re on the right track. You don t need to become an athlete. But you do need a certain amount of brisk exercise to help strengthen your heart. If you have been diagnosed with a heart condition, your healthcare provider may advise exercise to help your condition. To help make exercise a habit, choose safe, fun activities.      Exercise with a friend. When activity is fun, you're more likely to stick with it.     Before you start  Check with your healthcare provider before starting an exercise program. This is especially important if you haven't been active for a while. It's also important if you have a long-term (chronic) health problem such as heart disease, diabetes, or obesity. Also check with your provider if you're at high risk for having these problems.   Why exercise?  Exercising regularly offers many healthy rewards. It can help you do all of these:     Improve your blood cholesterol level to help prevent further heart trouble.    Lower your blood pressure to help prevent a stroke or heart attack.    Control diabetes or reduce your risk of getting this disease.    Improve your heart and lung function.    Reach and stay at a healthy weight.    Make your muscles stronger so you can stay active.    Prevent falls and fractures by slowing the loss of bone mass (osteoporosis).    Manage  stress better.    Improve your sense of self and your body image.  Exercise tips      Ease into your routine. Set small goals. Then build on them. Talk with your healthcare provider first before starting an exercise routine if you're not sure what your activity level should be.    Exercise on most days. Aim for a total of at least 150 minutes (2 hours and 30 minutes) or more of moderate-intensity aerobic activity each week. You could also do 75 minutes (1 hour and 15 minutes) or more of vigorous-intensity aerobic activity each week. Or try for a combination of both. Moderate activity means that you breathe heavier and your heart rate increases, but you can still talk. Think about doing at least 30 minutes of moderate exercise, 5 times a week. It's OK to work up to the 30-minute period over time. Examples of moderate-intensity activity are brisk walking, gardening, and water aerobics.    Step up your daily activity level.  Along with your exercise program, try being more active the whole day. Walk instead of drive. Or park further away so that you take more steps each day. Do more household tasks or yard work. You may not be able to meet the advised amount of physical activity. But doing some moderate- or vigorous-intensity aerobic activity can help reduce your risk for heart disease. Your healthcare provider can help you figure out what is best for you.    Choose 1 or more activities you enjoy.  Walking is one of the easiest things you can do. You can also try swimming, riding a bike, dancing, or taking an exercise class.    Call 911  Call 911 right away if any of these occur:     Chest pain that doesn't go away quickly with rest    New burning, tightness, pressure, or heaviness in your chest, neck, shoulders, back, or arms    Abnormal or severe shortness of breath    A very fast or irregular heartbeat (palpitations)    Fainting  When to call your healthcare provider  Call your healthcare provider if you have any of  these:     Dizziness or lightheadedness    Mild shortness of breath or chest pain    Increased or new joint or muscle pain    At[employer] last reviewed this educational content on 7/1/2022 2000-2022 [employer]. All rights reserved. This information is not intended as a substitute for professional medical care. Always follow your healthcare professional's instructions.          Understanding USDA MyPlate  The USDA has guidelines to help you make healthy food choices. These are called MyPlate. MyPlate shows the food groups that make up healthy meals using the image of a place setting. Before you eat, think about the healthiest choices for what to put on your plate or in your cup or bowl. To learn more about building a healthy plate, visit www.choosemyplate.gov.     The food groups    Fruits. Any fruit or 100% fruit juice counts as part of the Fruit Group. Fruits may be fresh, canned, frozen, or dried, and may be whole, cut-up, or pureed. Make 1/2 of your plate fruits and vegetables.    Vegetables. Any vegetable or 100% vegetable juice counts as a member of the Vegetable Group. Vegetables may be fresh, frozen, canned, or dried. They can be served raw or cooked and may be whole, cut-up, or mashed. Make 1/2 of your plate fruits and vegetables.    Grains. All foods made from grains are part of the Grains Group. These include wheat, rice, oats, cornmeal, and barley. Grains are often used to make foods such as bread, pasta, oatmeal, cereal, tortillas, and grits. Grains should be no more than 1/4 of your plate. At least half of your grains should be whole grains.    Protein. This group includes meat, poultry, seafood, beans and peas, eggs, processed soy products (such as tofu), nuts (including nut butters), and seeds. Make protein choices no more than 1/4 of your plate. Meat and poultry choices should be lean or low fat.    Dairy. The Dairy Group includes all fluid milk products and foods made from milk that  contain calcium, such as yogurt and cheese. (Foods that have little calcium, such as cream, butter, and cream cheese, are not part of this group.) Most dairy choices should be low-fat or fat-free.    Oils. Oils aren't a food group, but they do contain essential nutrients. However it's important to watch your intake of oils. These are fats that are liquid at room temperature. They include canola, corn, olive, soybean, vegetable, and sunflower oil. Foods that are mainly oil include mayonnaise, certain salad dressings, and soft margarines. You likely already get your daily oil allowance from the foods you eat.  Things to limit  Eating healthy also means limiting these things in your diet:    Salt (sodium). Many processed foods have a lot of sodium. To keep sodium intake down, eat fresh vegetables, meats, poultry, and seafood when possible. Purchase low-sodium, reduced-sodium, or no-salt-added food products at the store. And don't add salt to your meals at home. Instead, season them with herbs and spices such as dill, oregano, cumin, and paprika. Or try adding flavor with lemon or lime zest and juice.    Saturated fat. Saturated fats are most often found in animal products such as beef, pork, and chicken. They are often solid at room temperature, such as butter. To reduce your saturated fat intake, choose leaner cuts of meat and poultry. And try healthier cooking methods such as grilling, broiling, roasting, or baking. For a simple lower-fat swap, use plain nonfat yogurt instead of mayonnaise when making potato salad or macaroni salad.    Added sugars. These are sugars added to foods. They are in foods such as ice cream, candy, soda, fruit drinks, sports drinks, energy drinks, cookies, pastries, jams, and syrups. Cut down on added sugars by sharing sweet treats with a family member or friend. You can also choose fruit for dessert, and drink water or other unsweetened beverages.  Ritu last reviewed this educational  content on 6/1/2020 2000-2022 [employer]. All rights reserved. This information is not intended as a substitute for professional medical care. Always follow your healthcare professional's instructions.        Activities of Daily Living    Your Health Risk Assessment indicates you have difficulties with activities of daily living such as housework, bathing, preparing meals, taking medication, etc. Please make a follow up appointment for us to address this issue in more detail.    Signs of Hearing Loss  Hearing loss is a problem shared by many people. In fact, it's one of the most common health problems, particularly as people age. Most people aged 65 and older have some hearing loss. By age 80, almost everyone does. Hearing loss often occurs slowly over the years. So, you may not realize your hearing has gotten worse.   When sudden hearing loss occurs, it's important to contact your healthcare provider right away. Your provider will do a medical exam and a hearing exam as soon as possible. This is to help find the cause and type of your sudden hearing loss. Based on your diagnosis, your healthcare provider will discuss possible treatments.      Hearing much better with one ear can be a sign of hearing loss.     Have your hearing checked  Call your healthcare provider if you:     Have to strain to hear normal conversation    Have to watch other people s faces very carefully to follow what they re saying    Need to ask people to repeat what they ve said    Often misunderstand what people are saying    Turn the volume of the television or radio up so high that others complain    Feel that people are mumbling when they re talking to you    Find that the effort to hear leaves you feeling tired and irritated    Notice, when using the phone, that you hear better with one ear than the other  Ritu last reviewed this educational content on 6/1/2022 2000-2022 [employer]. All rights  reserved. This information is not intended as a substitute for professional medical care. Always follow your healthcare professional's instructions.          Urinary Incontinence, Female (Adult)   Urinary incontinence means loss of bladder control. This problem affects many women, especially as they get older. If you have incontinence, you may be embarrassed to ask for help. But know that this problem can be treated.   Types of Incontinence  There are different types of incontinence. Two of the main types are described here. You can have more than one type.     Stress incontinence. With this type, urine leaks when pressure (stress) is put on the bladder. This may happen when you cough, sneeze, or laugh. Stress incontinence most often occurs because the pelvic floor muscles that support the bladder and urethra are weak. This can happen after pregnancy and vaginal childbirth or a hysterectomy. It can also be due to excess body weight or hormone changes.    Urge incontinence (also called overactive bladder). With this type, a sudden urge to urinate is felt often. This may happen even though there may not be much urine in the bladder. The need to urinate often during the night is common. Urge incontinence most often occurs because of bladder spasms. This may be due to bladder irritation or infection. Damage to bladder nerves or pelvic muscles, constipation, and certain medicines can also lead to urge incontinence.  Treatment depends on the cause. Further evaluation is needed to find the type you have. This will likely include an exam and certain tests. Based on the results, you and your healthcare provider can then plan treatment. Until a diagnosis is made, the home care tips below can help ease symptoms.   Home care    Do pelvic floor muscle exercises, if they are prescribed. The pelvic floor muscles help support the bladder and urethra. Many women find that their symptoms improve when doing special exercises that  strengthen these muscles. To do the exercises, contract the muscles you would use to stop your stream of urine. But do this when you re not urinating. Hold for 10 seconds, then relax. Repeat 10 to 20 times in a row, at least 3 times a day. Your healthcare provider may give you other instructions for how to do the exercises and how often.    Keep a bladder diary. This helps track how often and how much you urinate over a set period of time. Bring this diary with you to your next visit with the provider. The information can help your provider learn more about your bladder problem.    Lose weight, if advised to by your provider. Extra weight puts pressure on the bladder. Your provider can help you create a weight-loss plan that s right for you. This may include exercising more and making certain diet changes.    Don't have foods and drinks that may irritate the bladder. These can include alcohol and caffeinated drinks.    Quit smoking. Smoking and other tobacco use can lead to a long-term (chronic) cough that strains the pelvic floor muscles. Smoking may also damage the bladder and urethra. Talk with your provider about treatments or methods you can use to quit smoking.    If drinking large amounts of fluid makes you have symptoms, you may be advised to limit your fluid intake. You may also be advised to drink most of your fluids during the day and to limit fluids at night.    If you re worried about urine leakage or accidents, you may wear absorbent pads to catch urine. Change the pads often. This helps reduce discomfort. It may also reduce the risk of skin or bladder infections.    Follow-up care  Follow up with your healthcare provider, or as directed. It may take some to find the right treatment for your problem. But healthy lifestyle changes can be made right away. These include such things as exercising on a regular basis, eating a healthy diet, losing weight (if needed), and quitting smoking. Your treatment plan  "may include special therapies or medicines. Certain procedures or surgery may also be options. Talk about any questions you have with your provider.   When to seek medical advice  Call the healthcare provider right away if any of these occur:    Fever of 100.4 F (38 C) or higher, or as directed by your provider    Bladder pain or fullness    Belly swelling    Nausea or vomiting    Back pain    Weakness, dizziness, or fainting  Ritu last reviewed this educational content on 1/1/2020 2000-2022 [employer]. All rights reserved. This information is not intended as a substitute for professional medical care. Always follow your healthcare professional's instructions.          Depression and Suicide in Older Adults  Nearly 2 million older adults in the [place] have some type of depression. Some of them even take their own lives. Yet depression among older adults is often ignored. Learning about the warning signs of depression may help spare a loved one needless pain. You may also save a life.   What is depression?  Depression is a common and serious illness. It affects the way you think and feel. It is not a normal part of aging. It is not a sign of weakness, a character flaw, or something you can \"snap out of.\" Most people with depression need treatment to get better. The most common symptom is a feeling of deep sadness. People who are depressed also may seem tired and listless. And nothing seems to give them pleasure. It s normal to grieve or be sad sometimes. But sadness lessens or passes with time. Depression rarely goes away or improves on its own. Other symptoms of depression are:     Sleeping more or less than normal    Eating more or less than normal    Having headaches, stomachaches, or other pains that don t go away    Feeling nervous,  empty,  or worthless    Crying a lot    Thinking or talking about suicide or death    Loss of interest in activities previously enjoyed    Social " isolation    Feeling confused or forgetful  What causes it?  The causes of depression aren t fully known. But it is thought to result from a complex blend of these factors:     Biochemistry. Certain chemicals in the brain play a role.    Genes. Depression does run in families.    Life stress. Life stresses can also trigger depression in some people. Older adults often face many stressors. These may include isolation, the death of friends or a spouse, health problems, and financial concerns.    Chronic health conditions. This includes diabetes, heart disease, or cancer. These can cause symptoms of depression. Medicine side effects can cause changes in thoughts and behaviors.  Giving support    Depressed people often may not want to ask for help. When they do, they may be ignored. Or they may get the wrong treatment. You can help by showing parents and older friends love and support. If they seem depressed, don t lecture the person or ignore the symptoms. Don't discount the symptoms as a  normal  part of aging. They are not. Get involved, listen, and show interest and support.   Help them understand that depression is a treatable illness. Tell them you can help them find the right treatment. Offer to go to their healthcare provider's appointment with them for support when the symptoms are discussed. With their approval, contact a local mental health center, social service agency, or hospital about services.   Helping at healthcare visits  You can be an advocate for them at healthcare appointments. Many older adults have chronic illnesses. Many of these can cause symptoms of depression. Medicine side effects can change thoughts and behaviors.   You can help make sure that the healthcare provider looks at all of these factors. They should refer your family member or friend to a mental healthcare provider when needed. In some cases, untreated depression can lead to a misdiagnosis. A person may be diagnosed with a brain  disorder such as dementia. If the healthcare provider does not take the issue of depression seriously, help your family member or friend find another provider.   Asking about self-harm thoughts  If you think an older person you care about could be suicidal, ask,  Have you thought about suicide?  Most people will tell you the truth. If they say yes, they may already have a plan for how and when they will attempt it. Find out as much as you can. The more detailed the plan, and the easier it is to carry out, the more danger the person is in right now. Tell the person you are there for them and you do not want them to get harmed. Don't wait to get help for the person. Call the person's healthcare provider, local hospital, or emergency services.   Call 988 in a crisis   Never leave the person alone. A person who is actively suicidal needs crisis care right away. They need constant supervision. Never leave the person out of sight. Call 988 Tell the crisis counselor you need help for a person who is thinking about suicide. The counselor will help you get the right level of crisis help. You may be advised to take the person to the nearest emergency room.   The National Suicide Prevention Lifeline is available at 988, or 520-969-DVNA (683-562-6633), or www.suicidepreventionlifeline.org. When you call or text 988, you will be connected to trained counselors who are part of the National Suicide Prevention Lifeline network. An online chat option is also available. Lifeline is free and available 24/7.   To learn more    National Suicide Prevention Lifeline at www.suicidepreventionlifeline.org  or 188-807-BIIG (518-437-5012)    National [employer] Mental [employer]ness at www.erlinda.org  or 920-474-LRDM (606-852-4973)    M[facility] at www.nmha.org  or 557-062-6627    National [place] of Mental Health at www.nimh.nih.gov  or 931-854-4220    Ritu last reviewed this educational content on 7/1/2022 2000-2022 [employer]. All rights reserved. This information is not intended as a substitute for professional medical care. Always follow your healthcare professional's instructions.          Preventing Falls at Home  A person can fall for many reasons. Older adults may fall because reaction time slows down as we age. Your muscles and joints may get stiff, weak, or less flexible because of illness, medicines, or a physical condition.   Other health problems that make falls more likely include:     Arthritis    Dizziness or lightheadedness when you stand up (orthostatic hypotension)    History of a stroke    Dizziness    Anemia    Certain medicines taken for mental illness or to control blood pressure.    Problems with balance or gait    Bladder or urinary problems    History of falling    Changes in vision (vision impairment)    Changes in thinking skills and memory (cognitive impairment)  Injuries from a fall can include serious injuries such as broken bones, dislocated joints, internal bleeding and cuts. Injuries like these can limit your independence.   Prevention tips  To help prevent falls and fall-related injuries, follow the tips below.    Floors  To make floors safer:     Put nonskid pads under area rugs.    Remove small rugs.    Replace worn floor coverings.    Tack carpets firmly to each step on carpeted stairs. Put nonskid strips on the edges of uncarpeted stairs.    Keep floors and stairs free of clutter and cords.    Arrange furniture so there are clear pathways.    Clean up any spills right away.  Bathrooms    To make bathrooms safer:     Install grab bars in the tub or shower.    Apply nonskid strips or put a nonskid rubber mat in the tub or shower.    Sit on a bath chair to bathe.    Use bathmats with nonskid backing.  Lighting  To improve visibility in your home:      Keep a flashlight in each room. Or put a lamp next to the bed within easy reach.    Put nightlights in the bedrooms, hallways, kitchen, and  bathrooms.    Make sure all stairways have good lighting.    Take your time when going up and down stairs.    Put handrails on both sides of stairs and in walkways for more support. To prevent injury to your wrist or arm, don t use handrails to pull yourself up.    Install grab bars to pull yourself up.    Move or rearrange items that you use often. This will make them easier to find or reach.    Look at your home to find any safety hazards. Especially look at doorways, walkways, and the driveway. Remove or repair any safety problems that you find.  Other changes to make    Look around to find any safety hazards. Look closely at doorways, walkways, and the driveway. Remove or repair any safety problems that you find.    Wear shoes that fit well.    Take your time when going up and down stairs.    Put handrails on both sides of stairs and in walkways for more support. To prevent injury to your wrist or arm, don t use handrails to pull yourself up.    Install grab bars wherever needed to pull yourself up.    Arrange items that you use often. This will make them easier to find or reach.    [employer] last reviewed this educational content on 3/1/2020    9428-7316 [employer]. All rights reserved. This information is not intended as a substitute for professional medical care. Always follow your healthcare professional's instructions.

## 2023-04-12 NOTE — LETTER
My COPD Action Plan     Name: Betty Tee    YOB: 1940   Date: 4/12/2023    My doctor: Ilene Tristan MD   [facility] [address] [facility], [address]  [facility] [address] [phone]  My Controller Medicine: Vilanterol/fluticasone (Breo Ellipta)   Dose: 1 puff daily     My Rescue Medicine: Albuterol (Proair/Ventolin/Proventil) inhaler   Dose: 2 puffs Q6H     My Flare Up Medicine:    Dose:      My COPD Severity:       Use of Oxygen: Oxygen Not Prescribed      Make sure you've had your pneumonia   vaccines.          GREEN ZONE       Doing well today      Usual level of activity and exercise    Usual amount of cough and mucus    No shortness of breath    Usual level of health (thinking clearly, sleeping well, feel like eating) Actions:      Take daily medicines    Use oxygen as prescribed    Follow regular exercise and diet plan    Avoid cigarette smoke and other irritants that harm the lungs           YELLOW ZONE          Having a bad day or flare up      Short of breath more than usual    A lot more sputum (mucus) than usual    Sputum looks yellow, green, tan, brown or bloody    More coughing or wheezing    Fever or chills    Less energy; trouble completing activities    Trouble thinking or focusing    Using quick relief inhaler or nebulizer more often    Poor sleep; symptoms wake me up    Do not feel like eating Actions:      Get plenty of rest    Take daily medicines    Use quick relief inhaler every 6 hours    If you use oxygen, call you doctor to see if you should adjust your oxygen    Do breathing exercises or other things to help you relax    Let a loved one, friend or neighbor know you are feeling worse    Call your care team if you have 2 or more symptoms.  Start taking steroids or antibiotics if directed by your care team           RED ZONE       Need medical care now      Severe shortness of breath (feel you can't breathe)    Fever,  chills    Not enough breath to do any activity    Trouble coughing up mucus, walking or talking    Blood in mucus    Frequent coughing   Rescue medicines are not working    Not able to sleep because of breathing    Feel confused or drowsy    Chest pain    Actions:      Call your health care team.  If you cannot reach your care team, call 911 or go to the emergency room.        Annual Reminders:  Meet with Care Team, Flu Shot every Fall  Pharmacy: [id number]  [place], [place] - [address]

## 2023-04-12 NOTE — PROGRESS NOTES
"SUBJECTIVE:   Betty is a 82 year old who presents for Preventive Visit.       View : No data to display.            Patient has been advised of split billing requirements and indicates understanding: Yes  Are you in the first 12 months of your Medicare coverage?  No    Healthy Habits:     In general, how would you rate your overall health?  Good    Frequency of exercise:  None    Do you usually eat at least 4 servings of fruit and vegetables a day, include whole grains    & fiber and avoid regularly eating high fat or \"junk\" foods?  No    Taking medications regularly:  Yes    Medication side effects:  Other    Ability to successfully perform activities of daily living:  Medication administration requires assistance    Home Safety:  No safety concerns identified    Hearing Impairment:  Need to ask people to speak up or repeat themselves and difficulty understanding soft or whispered speech    In the past 6 months, have you been bothered by leaking of urine? Yes    In general, how would you rate your overall mental or emotional health?  Good      PHQ-2 Total Score: 2    Additional concerns today:  No     Wellness Visit RN-Hybrid Notes:    -Last mammo done Jan 2014, screening now discontinued.   -Last DEXA done 8/20/19 (impression: osteopenia; most negative T-score of -2.1 at L hip).  Order placed for updated DEXA scan.   -Last colon cancer screening done via colonoscopy on 5/03/22 (impression: overall normal exam, poor prep).   -Immunizations: Pt is up to date on current recommended vaccines  -ACP: Medical directive/ACP documents on file. Advised pt to update as necessary.   -Pt denies any safety concerns  -Education: Pt education provided on hearing, incontinence, ADL, and exercise concerns. Pt education printed onto AVS  -COPD Action Plan completed in Communications; will auto-send at sign visit  -Prepped pt for diabetic foot exam in " room  -----------------------------------------------------------------------------    Cards update--  3/31/23 consultation- able to to stop the clodipogrel/plavix at that appt, 6 months out from Watchman procedure.  Should be on lifelong asa.  Can stop prophylactic abx.  Sees Dr. Rosa tomorrow-   --Ask about stopping asa prior to surgery.  --Ask about if okay to lower torsemide dose due to urinary urgency symptoms, lower BPs.  --Ask about if lipitor dose too high with low LDL?  Only at 5mg, but ??    R hip pain/DJD/Pain issues-   Surgery scheduled for 5/23.  Continues to have severe pain in R hip/upper leg area, though has been a bit more manageable the last couple weeks since she relented and started taking the percocet.  Has found that if she takes one percocet and two tylenol tabs in the morning, her pain is much improved until evening.  Takes pain down from ~8/10 to 1-2/10.  Was able to go grocery shopping.  Better able to manage doing things.  Percocet is from Dr. Alba, pain clinic.  Will see if she needs refills prior to surgery.      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  Now s/p watchman's procedure, and able to stop plavix at last cardiology visit on 3/31/23.  No dark/bloody stools, no abd pain.  She has been getting more constipated with the percocet, so has been taking a stool softener from [facility], unsure name.  Fine to continue if colace, if not send msg.    Sjogrens/Inflam arthritis- cont follow-up with Dr. Lopez.  Cont follow-up with her for dexa/fosamax as well.      DM II/wt loss- mostly just on ozempic 1mg weekly.  Stopped basaglar since 3/23 hospitalization, and rare meal-time insulin.  Most glucose readings ~130s, occasionally 150s.  Instructed to take insulin if >150, so really hasn't.  Since hospital stay, only taken insulin 1-2 times.  A1C 7.3 recently.  Reviewed wt loss- loss really started ~10/20 when ozempic dose increased to 1mg weekly.  Did lose  more with norovirus.  Suspect that will stabilize now.  Okay to continue ozempic and prn use of meal-time insulin unless glucose readings increase again with increased appetite.    HTN/CKD-   Continued off the spironolactone per PAUL Soler.  Will check with Dr. Rosa at [facility] tomorrow if able to lower torsemide dose.    MDD- mood is good, though gets grumpy, though about the same as usual.  Pain really affects her mood.        Recent set-backs with illnesses-  --Hospitalized in 3/23 with norovirus, took until about last week to start getting her appetite back.  --COVID dx right after last visit here- txt with monoclonal antibodies txt  Felt pretty sick for ~2 wks, better for the past week.      Concerns today-   --Left ear drainage.  Wants a good exam of her ear.  Reviewed exam is normal today, minimal wax, wax drainage she is seeing is fine.  --Rash on left knee and R lateral ankle.  Rec vanicream or cera-ve.  --Toe - numbness and tingling for months.  B12 levels were low >1yr ago, but have been good to high in the past year. Likely peripheral neuropathy from DM II, though that has been under good control.  Foot exam okay today- sensation in toes intact bilaterally.    Urination- urinary urgency, once she gets the urge, has to get there immediately.  Saw Dr. Nassar, and switched torsemide dosing to higher dose in am- 40mg in am and 30mg in pm, which helped some them. She also rec estradiol topically, but pt hasn't been using it lately.  She can't remember if taking estradiol helped or not- might retry.  --Will check ua, if neg for infx, restart estradiol and discuss torsemide with cardiology.  If still with sx's, send back to Dr. Nassar.    ANGELICA- pt notes that BiPap pressure seems too much lately?  Feels like her lungs purge air/pressure when she takes it off in am.  Last note of BiPap adjustment in chart ~2/21, but unable to see office notes.  Will refer back to sleep clinic to check BiPap pressures.          Have  you ever done Advance Care Planning? (For example, a Health Directive, POLST, or a discussion with a medical provider or your loved ones about your wishes): Yes, advance care planning is on file.      Fall risk  Fallen 2 or more times in the past year?: Yes  Any fall with injury in the past year?: No    Cognitive Screening   1) Repeat 3 items (Leader, Season, Table)    2) Clock draw: NORMAL  3) 3 item recall: Recalls 3 objects  Results: 3 items recalled: COGNITIVE IMPAIRMENT LESS LIKELY    Mini-CogTM Copyright TIMOTHY Wilcox. Licensed by the author for use in [facility]; reprinted with permission (emerita@[facility]). All rights reserved.      Do you have sleep apnea, excessive snoring or daytime drowsiness?: yes    Reviewed and updated as needed this visit by clinical staff   Tobacco  Allergies  Meds  Problems  Med Hx  Surg Hx  Fam Hx          Reviewed and updated as needed this visit by Provider   Tobacco  Allergies  Meds  Problems  Med Hx  Surg Hx  Fam Hx         Social History     Tobacco Use     Smoking status: Former     Packs/day: 0.50     Types: Cigarettes     Start date:      Quit date: 2011     Years since quittin.7     Smokeless tobacco: Never     Tobacco comments:     1/2 ppd   Vaping Use     Vaping status: Never Used   Substance Use Topics     Alcohol use: No     Alcohol/week: 0.0 standard drinks of alcohol     Comment: In recovery beginning 2023    12:51 PM   Alcohol Use   Prescreen: >3 drinks/day or >7 drinks/week? Not Applicable          View : No data to display.              Do you have a current opioid prescription? Yes - from pain clinic  How severe is your pain on a scale from 1-10? 0/10 while seated, pain increases with mobility  Do you use any other controlled substances or medications that are not prescribed by a provider? None        Current providers sharing in care for this patient include:   Patient Care Team:  Ilene Tristan,  MD as PCP - General  Ilene Tristan MD as Assigned PCP  Kirill Polk MD as MD (Otolaryngology)  Aubrey Hurtado MD as MD (Cardiology)  Anderson Perez MD as MD (Clinical Cardiac Electrophysiology)  Radha Rosa MD as MD (Cardiology)  Kiesha Elias, LIZY CNP as Nurse Practitioner (Cardiology)  Beatriz Blanco, RN as Nurse Coordinator (Cardiology)  Carmenza Stringer MD as MD (Rheumatology)  Danay Payne, JASON as Specialty Care Coordinator (Cardiology)  Sabrina Meek [facility] as Pharmacist (Pharmacist)  Flor Velasquez, RN as Specialty Care Coordinator (Cardiology)  Zulma Lopez MD as MD (Rheumatology)  Kami Lang MD as MD (Ophthalmology)  Reina Betancur MD as MD (Internal Medicine)  Zulma Lopez MD as Referring Physician (Rheumatology)  Zulma Lopez MD as Assigned Rheumatology Provider  Maryjane Tillman MD as Assigned Pulmonology Provider  Kelechi Santana MD as Assigned Surgical Provider  Thomas Shannon MD as Assigned Musculoskeletal Provider  Sabrina Meek [facility] as Assigned MTM Pharmacist  Guru Winsome Dias MD as Assigned Gastroenterology Provider  Marina Soler NP as Assigned Heart and Vascular Provider  Suzanne Whitman, [facility] as Pharmacist (Pharmacist)  Alanna Alba MD as Assigned Pain Medication Provider    The following health maintenance items are reviewed in Epic and correct as of today:  Health Maintenance   Topic Date Due     DEXA  08/20/2022     ANNUAL REVIEW OF HM ORDERS  09/14/2022     EYE EXAM  04/15/2023     MICROALBUMIN  05/28/2023     A1C  06/14/2023     BMP  07/31/2023     URINE DRUG SCREEN  09/22/2023     PHQ-9  10/12/2023     LIPID  03/14/2024     TSH W/FREE T4 REFLEX  03/14/2024     ALT  03/21/2024     CBC  03/21/2024     HEMOGLOBIN  03/31/2024     MEDICARE ANNUAL WELLNESS VISIT  04/12/2024     DIABETIC FOOT EXAM  04/12/2024     FALL RISK ASSESSMENT  04/12/2024     HF  ACTION PLAN  03/21/2026     ADVANCE CARE PLANNING  04/12/2028     DTAP/TDAP/TD IMMUNIZATION (3 - Td or Tdap) 12/04/2030     COLORECTAL CANCER SCREENING  05/03/2032     PARATHYROID  Completed     PHOSPHORUS  Completed     SPIROMETRY  Completed     COPD ACTION PLAN  Completed     DEPRESSION ACTION PLAN  Completed     INFLUENZA VACCINE  Completed     Pneumococcal Vaccine: 65+ Years  Completed     URINALYSIS  Completed     ALK PHOS  Completed     ZOSTER IMMUNIZATION  Completed     COVID-19 Vaccine  Completed     IPV IMMUNIZATION  Aged Out     MENINGITIS IMMUNIZATION  Aged Out     MAMMO SCREENING  Discontinued         Lab work is in process  Labs reviewed in EPIC  BP Readings from Last 3 Encounters:   04/13/23 107/63   04/12/23 110/70   03/21/23 116/72    Wt Readings from Last 3 Encounters:   04/13/23 79.2 kg (174 lb 9.6 oz)   03/21/23 77.6 kg (171 lb 1.6 oz)   03/08/23 81.4 kg (179 lb 6.4 oz)                  Patient Active Problem List   Diagnosis     Temporomandibular joint disorder     Diverticulitis of colon     Osteopenia of multiple sites     Alcohol abuse, in remission     Restless legs syndrome (RLS)     Major depressive disorder, recurrent episode, moderate (H)     Essential hypertension with goal blood pressure less than 140/90     Advanced directives, counseling/discussion     Colouterine fistula     Paroxysmal atrial fibrillation (H)     Left ventricular hypertrophy     Health Care Home     Insomnia     Allergic reaction caused by a drug - likely plaquenil     Breast fibroadenoma     History of deep vein thrombosis (DVT) of lower extremity     Fatty liver disease, nonalcoholic     Rib pain     Neck mass     Gastroesophageal reflux disease without esophagitis     Enlarged lymph node     Sjogren's syndrome with lung involvement (H)     ANGELICA (obstructive sleep apnea)     ILD (interstitial lung disease) (H)     Lower GI bleed     (HFpEF) heart failure with preserved ejection fraction (H)     Type 2 diabetes  mellitus with hyperglycemia, with long-term current use of insulin (H)     Heart failure, chronic, with acute decompensation (H)     Hyperlipidemia LDL goal <100     Long term current use of anticoagulant therapy     Inflammatory arthritis     Follicular bronchiolitis (H)     Stage 3b chronic kidney disease (H)     Closed head injury     Urge incontinence of urine     Osteoarthritis of right hip     Hip pain, right     Seasonal allergic rhinitis due to pollen     Elevated parathyroid hormone     Persistent atrial fibrillation (H)     Vitamin D deficiency     GAVE (gastric antral vascular ectasia)     Iron deficiency anemia, unspecified iron deficiency anemia type     Gout, unspecified cause, unspecified chronicity, unspecified site     Gastroenteritis     Lactic acidosis     Past Surgical History:   Procedure Laterality Date     APPENDECTOMY       BACK SURGERY  1962     BACK SURGERY  1962     BIOPSY BREAST  09/27/2002    Biopsy Left Breast     BIOPSY BREAST Left 09/27/2002     BREAST SURGERY       CARDIAC SURGERY       CARDIAC SURGERY       CHOLECYSTECTOMY  1990's?     CHOLECYSTECTOMY       COLECTOMY LEFT  11/07/2011    Procedure:COLECTOMY LEFT; Laparoscopic mobilization of splenic flexture, sigmoid colectomy, coloprotoscopy, loop illeostomy; Surgeon:CK CASTANEDA; Location:[facility] OR     COLECTOMY LEFT  11/07/2011     COLONOSCOPY  1990,s     COLONOSCOPY N/A 5/3/2022    Procedure: COLONOSCOPY;  Surgeon: Guru Winsome Dias MD;  Location:  GI     CV LEFT ATRIAL APPENDAGE CLOSURE N/A 9/26/2022    Procedure: Left Atrial Appendage Closure;  Surgeon: Uzair Crews MD;  Location:  HEART CARDIAC CATH LAB     ENT SURGERY       EYE SURGERY  2012     FLEXIBLE SIGMOIDOSCOPY  11/03/2011     HYSTERECTOMY TOTAL ABDOMINAL, BILATERAL SALPINGO-OOPHORECTOMY, COMBINED  11/07/2011    Procedure:COMBINED HYSTERECTOMY TOTAL ABDOMINAL, BILATERAL SALPINGO-OOPHORECTOMY; total abdominal hysterectomy, bilateral  salpingo-oophorectomy; Surgeon:ALETA MANUEL; Location:[facility] OR     HYSTERECTOMY TOTAL ABDOMINAL, BILATERAL SALPINGO-OOPHORECTOMY, COMBINED  2011     ILEOSTOMY  2012    takedown loop ileostomy      INSERT STENT URETER  2011    Procedure:INSERT STENT URETER; Placement of Bilateral Ureteral Stents ; Surgeon:PRANEETH BRYANT; Location:[facility] OR     SIGMOIDECTOMY  2012    Dr. Siddiqi, Sigmoidectomy for diverticular abscess, iliostomy afterwards until repair     SIGMOIDOSCOPY FLEXIBLE  2011    Procedure:SIGMOIDOSCOPY FLEXIBLE; Flexible Sigmoidoscopy; Surgeon:CK SIDDIQI; Location:[facility] OR     TAKEDOWN ILEOSTOMY  2012    Procedure:TAKEDOWN ILEOSTOMY; Takedown Loop Ileostomy ; Surgeon:CK SIDDIQI; Location:[facility] OR     URETERAL STENT PLACEMENT  2011     ZZC APPENDECTOMY  1970's?     ZZC NONSPECIFIC PROCEDURE  2005    exploratory abd lap, adhesions, resolved RLQ pain, diverticulitis episodes       Social History     Tobacco Use     Smoking status: Former     Packs/day: 0.50     Types: Cigarettes     Start date:      Quit date: 2011     Years since quittin.7     Smokeless tobacco: Never     Tobacco comments:     1/2 ppd   Vaping Use     Vaping status: Never Used   Substance Use Topics     Alcohol use: No     Alcohol/week: 0.0 standard drinks of alcohol     Comment: In recovery beginning      Family History   Problem Relation Age of Onset     C.A.D. Mother 63        MI- first at age 63     Heart Disease Mother      Hypertension Mother      Cerebrovascular Disease Mother      Hyperlipidemia Mother      Coronary Artery Disease Mother         MI-first at age 63     Other - See Comments Mother         cerebrovascular disease      Alcohol/Drug Father      Alzheimer Disease Father      Dementia Father      Hypertension Father      Hyperlipidemia Father      Substance Abuse Father      Diabetes Sister      Hypertension Sister      Hyperlipidemia Sister      Substance  Abuse Sister      Asthma Sister      C.A.D. Sister 52        Minor MI- age 50's     Heart Disease Sister      Hypertension Sister      Hypertension Sister      Cancer - colorectal Sister 48        Late 40's early 50's     Gastrointestinal Disease Sister         Diverticulitis     Lipids Sister      Lipids Sister      Diabetes Sister      Heart Disease Sister         CHF     Cancer Sister         lung, smoker     Substance Abuse Sister      Asthma Sister      Cancer Sister      Coronary Artery Disease Sister         minor MI-age 50's     Heart Disease Sister      Hypertension Sister      Hypertension Sister      Colon Cancer Sister      Diverticulitis Sister      Lung Cancer Sister      Heart Disease Sister         CHF     Diabetes Sister      Asthma Sister      Hypertension Brother      Prostate Cancer Brother 74        Dx'd age 74     Gastrointestinal Disease Brother         Diverticulitis     Parkinsonism Brother      Substance Abuse Brother      Prostate Cancer Brother      Hyperlipidemia Brother      Hypertension Brother      Prostate Cancer Brother      Diverticulitis Brother      Parkinsonism Brother      Breast Cancer Daughter      Breast Cancer Daughter      Diabetes Other      Hypertension Other      Anesthesia Reaction No family hx of      Deep Vein Thrombosis (DVT) No family hx of          Current Outpatient Medications   Medication Sig Dispense Refill     ACCU-CHEK SHANNON PLUS test strip USE TO TEST BLOOD SUGARS 3 TIMES DAILY 300 strip 5     acetaminophen (TYLENOL) 500 MG tablet Take 1,000 mg by mouth every 8 hours as needed (max 6 tablets/24 hours, 2 tablets/dose)       acyclovir (ZOVIRAX) 400 MG tablet Take 1 tablet (400 mg) by mouth 3 times daily as needed For a couple days 15 tablet 2     acyclovir (ZOVIRAX) 5 % external ointment Apply topically as needed (6 times day PRN for outbreaks) As needed for outbreaks 15 g 3     albuterol (PROAIR HFA/PROVENTIL HFA/VENTOLIN HFA) 108 (90 Base) MCG/ACT inhaler  Inhale 2 puffs into the lungs every 6 hours 18 g 11     alendronate (FOSAMAX) 70 MG tablet Take 1 tablet (70 mg) by mouth every 7 days 4 tablet 1     allopurinol (ZYLOPRIM) 100 MG tablet Take 1 tablet (100 mg) by mouth daily 90 tablet 1     anakinra (KINERET) 100 MG/0.67ML SOSY injection 100 mg every day x 3 days as needed for flare ups 6.7 mL 3     aspirin (ASA) 81 MG EC tablet Take 1 tablet (81 mg) by mouth daily 90 tablet 1     atorvastatin (LIPITOR) 10 MG tablet TAKE 1/2 TABLET BY MOUTH EVERY DAY 45 tablet 0     azithromycin (ZITHROMAX) 250 MG tablet TAKE 1 TABLET BY MOUTH EVERY DAY 30 tablet 5     BD PEN NEEDLE JANE 2ND GEN 32G X 4 MM miscellaneous USE TO TEST 4 TIMES A  each 1     blood glucose (NO BRAND SPECIFIED) lancets standard Use to test blood sugar 3 times daily or as directed 100 lancet 3     cevimeline (EVOXAC) 30 MG capsule Take 1 capsule (30 mg) by mouth 3 times daily (Patient taking differently: Take 30 mg by mouth 3 times daily as needed) 90 capsule 11     Cholecalciferol (VITAMIN D3) 50 MCG (2000 UT) CAPS TAKE 100 MCG BY MOUTH DAILY (TAKE 2 TABLET (50 MCG) BY MOUTH DAILY - ORAL) 180 capsule 0     COMPOUNDED NON-CONTROLLED SUBSTANCE (CMPD RX) - PHARMACY TO MIX COMPOUNDED MEDICATION Estriol 1 mg/g Apply small amount to finger and apply to inside vagina daily for 2 weeks then twice weekly Route: vaginally Dispense 30 grams 11 refills 30 g 11     cyanocobalamin (VITAMIN B-12) 1000 MCG tablet Take 1 tablet (1,000 mcg) by mouth daily (Patient taking differently: Take 1,000 mcg by mouth three times a week) 30 tablet 1     escitalopram (LEXAPRO) 20 MG tablet TAKE 1 TABLET BY MOUTH EVERY DAY 90 tablet 1     ferrous gluconate (FERGON) 324 (38 Fe) MG tablet Take 1 tablet (324 mg) by mouth daily (with breakfast) 90 tablet 1     fluticasone (FLONASE) 50 MCG/ACT nasal spray SPRAY 1-2 SPRAYS INTO BOTH NOSTRILS DAILY AS NEEDED FOR ALLERGIES 16 mL 11     fluticasone-vilanterol (BREO ELLIPTA) 100-25 MCG/INH  inhaler Inhale 1 puff into the lungs daily 1 each 11     hydroxychloroquine (PLAQUENIL) 200 MG tablet Take 2 tablets (400 mg) by mouth daily Get annual eye exams for hydroxychloroquine (Plaquenil) monitoring and fax to [phone] 180 tablet 3     insulin lispro (HUMALOG KWIKPEN) 100 UNIT/ML (1 unit dial) KWIKPEN Inject 8 Units Subcutaneous 3 times daily (before meals)       ketoconazole (NIZORAL) 2 % external cream Apply topically 2 times daily as needed for itching 60 g 1     KLOR-CON 20 MEQ CR tablet TAKE 2 TABLETS (40 MEQ) BY MOUTH EVERY MORNING AND 1 TABLET (20 MEQ) EVERY EVENING. 270 tablet 3     lidocaine (LIDODERM) 5 % patch APPLY PATCH TO PAINFUL AREA FOR UP TO 12 H WITHIN A 24 H PERIOD. REMOVE AFTER 12 HOURS. (Patient taking differently: Apply patch to painful area for up to 12 h within a 24 h period.  Remove after 12 hours.) 30 patch 2     loratadine (CLARITIN) 10 MG tablet TAKE 1 TABLET BY MOUTH EVERY DAY 90 tablet 3     methocarbamol (ROBAXIN) 750 MG tablet Take 1 tablet (750 mg) by mouth 3 times daily as needed for muscle spasms 90 tablet 3     metoprolol succinate ER (TOPROL XL) 50 MG 24 hr tablet Take 1 tablet (50 mg) by mouth 2 times daily 90 tablet 3     montelukast (SINGULAIR) 10 MG tablet TAKE 1 TABLET BY MOUTH EVERYDAY AT BEDTIME 90 tablet 0     oxyCODONE-acetaminophen (PERCOCET) 7.5-325 MG per tablet Take 1 tablet by mouth 2 times daily as needed for severe pain (7-10) With at least 4 hours between doses. Fill and start 2/17/23 60 tablet 0     OZEMPIC, 1 MG/DOSE, 4 MG/3ML pen INJECT 1 MG SUBCUTANEOUS ONCE A WEEK 3 mL 1     pantoprazole (PROTONIX) 40 MG EC tablet Take 1 tablet (40 mg) by mouth daily (replaces famotidine- should stop taking famotidine) 90 tablet 3     predniSONE (DELTASONE) 5 MG tablet Take 1 tablet (5 mg) by mouth daily 90 tablet 1     traZODone (DESYREL) 50 MG tablet TAKE 3 TABLETS (150 MG) BY MOUTH NIGHTLY AS NEEDED FOR SLEEP 270 tablet 0     torsemide (DEMADEX) 20 MG tablet  Take 1 tablet (20 mg) by mouth 2 times daily 180 tablet 3     Allergies   Allergen Reactions     Amoxicillin-Pot Clavulanate      Augmentin Nausea and Vomiting     Codeine Nausea and Vomiting     PN: LW Reaction: HIVES     Penicillins Nausea and Vomiting     PN: LW Reaction: GI Upset     Phenobarbital Itching     Seasonal Allergies      Recent Labs   Lab Test 04/13/23  1229 03/31/23  1404 03/21/23  1557 03/14/23  1657 03/09/23  0626 03/08/23  1635 03/08/23  0716 01/13/23  1656 11/28/22  1024 02/11/22  1552 12/09/21  1033 09/09/21  1316 06/04/21  1422 03/03/21  1331 12/04/20  1435 10/20/20  0932   A1C  --   --   --  7.3* 7.3*  --   --   --  5.5   < > 6.3*  --  7.1* 8.0*  --  8.7*   LDL  --   --   --  22  --   --   --   --   --   --  16  --   --   --   --  19   HDL  --   --   --  42*  --   --   --   --   --   --  59  --   --   --   --  71   TRIG  --   --   --  90  --   --   --   --   --   --  145  --   --   --   --  125   ALT  --   --  17 16  --   --  7*   < > 10   < > 17  --  25  --    < >  --    CR 0.99* 1.03* 1.01* 0.76 0.81  --  1.04*  1.04*   < > 1.09*   < > 1.05*   < > 1.11* 1.02   < > 1.16*   GFRESTIMATED 57* 54* 55* 78 72  --  53*  53*   < > 50*   < > 50*   < > 47* 52*   < > 44*   GFRESTBLACK  --   --   --   --   --   --   --   --   --   --   --   --  54* 60*   < > 51*   POTASSIUM 3.6 3.6 4.3 3.8 3.6   < > 3.1*  3.1*   < > 4.0   < >  --    < > 4.0 3.8   < > 3.4   TSH  --   --   --  2.14  --   --   --   --  3.40  --  2.22  --   --   --   --   --     < > = values in this interval not displayed.          Mammogram Screening - Mammography discussed and declined  Pertinent mammograms are reviewed under the imaging tab.    Review of Systems   Gastrointestinal: Positive for constipation.   Breasts:  Negative for tenderness, breast mass and discharge.   Genitourinary: Positive for frequency and urgency. Negative for pelvic pain, vaginal bleeding and vaginal discharge.   Musculoskeletal: Positive for arthralgias and  "myalgias.   Neurological: Positive for weakness.         OBJECTIVE:   /70   Pulse 52   Temp 97.2  F (36.2  C) (Temporal)   Resp 16   SpO2 95%  Estimated body mass index is 28.47 kg/m  as calculated from the following:    Height as of 3/7/23: 1.651 m (5' 5\").    Weight as of 3/21/23: 77.6 kg (171 lb 1.6 oz).  Physical Exam  GENERAL APPEARANCE: healthy, alert and no distress  EYES: Eyes grossly normal to inspection, PERRL and conjunctivae and sclerae normal  HENT: ear canals and TM's normal  NECK: no adenopathy, no asymmetry, masses, or scars and thyroid normal to palpation  RESP: lungs clear to auscultation - no rales, rhonchi or wheezes  CV: regular rate and rhythm, normal S1 S2, no S3 or S4, no murmur, click or rub, no peripheral edema and peripheral pulses strong  ABDOMEN: soft, nontender, no hepatosplenomegaly, no masses and bowel sounds normal  SKIN: no suspicious lesions or rashes  NEURO: Normal strength and tone, sensory exam grossly normal, mentation intact and speech normal  PSYCH: mentation appears normal and affect normal/bright    Diagnostic Test Results:  Labs reviewed in Epic      ASSESSMENT / PLAN:       ICD-10-CM    1. Encounter for Medicare annual wellness exam  Z00.00 PRIMARY CARE FOLLOW-UP SCHEDULING      2. Primary osteoarthritis of right hip  M16.11 PRIMARY CARE FOLLOW-UP SCHEDULING      3. GAVE (gastric antral vascular ectasia)  K31.819       4. Paroxysmal atrial fibrillation (H)  I48.0 PRIMARY CARE FOLLOW-UP SCHEDULING      5. Chronic heart failure with preserved ejection fraction (H)  I50.32       6. Sjogren's syndrome with lung involvement (H)  M35.02       7. Osteopenia of multiple sites  M85.89       8. Type 2 diabetes mellitus with hyperglycemia, with long-term current use of insulin (H)  E11.65 FOOT EXAM    Z79.4 PRIMARY CARE FOLLOW-UP SCHEDULING      9. Essential hypertension with goal blood pressure less than 140/90  I10 PRIMARY CARE FOLLOW-UP SCHEDULING      10. Stage 3b " chronic kidney disease (H)  N18.32 PRIMARY CARE FOLLOW-UP SCHEDULING      11. Hyperlipidemia LDL goal <100  E78.5       12. Major depressive disorder, recurrent episode, moderate (H)  F33.1 PRIMARY CARE FOLLOW-UP SCHEDULING      13. Urinary urgency  R39.15 PRIMARY CARE FOLLOW-UP SCHEDULING     UA with Microscopic reflex to Culture - lab collect     CANCELED: UA with Microscopic reflex to Culture - lab collect      14. Urge incontinence of urine  N39.41       15. ANGELICA (obstructive sleep apnea)  G47.33 Adult Sleep Eval & Management  Referral     PRIMARY CARE FOLLOW-UP SCHEDULING      16. Polyneuropathy associated with underlying disease (H)  G63 PRIMARY CARE FOLLOW-UP SCHEDULING      17. Finger dysfunction  R29.898         Wellness Visit RN-Hybrid Notes:    -Last mammo done Jan 2014, screening now discontinued.   -Last DEXA done 8/20/19 (impression: osteopenia; most negative T-score of -2.1 at L hip).  Order placed for updated DEXA scan.   -Last colon cancer screening done via colonoscopy on 5/03/22 (impression: overall normal exam, poor prep).   -Immunizations: Pt is up to date on current recommended vaccines  -ACP: Medical directive/ACP documents on file. Advised pt to update as necessary.   -Pt denies any safety concerns  -Education: Pt education provided on hearing, incontinence, ADL, and exercise concerns. Pt education printed onto AVS  -COPD Action Plan completed in Communications; will auto-send at sign visit  -Prepped pt for diabetic foot exam in room  -----------------------------------------------------------------------------    R hip pain/DJD/Pain issues-   Surgery scheduled for 5/23.  Continues to have severe pain in R hip/upper leg area, though has been a bit more manageable the last couple weeks since she relented and started taking the percocet.  Has found that if she takes one percocet and two tylenol tabs in the morning, her pain is much improved until evening.  Takes pain down from ~8/10 to  1-2/10.  Was able to go grocery shopping.  Better able to manage doing things.  Percocet is from Dr. Alba, pain clinic.  Will see if she needs refills prior to surgery.  --Okay to continue current pain medication regimen.  --Follow-up here for pre-op in a few weeks- appt is scheduled.    Cardiology updates/a.fib/CHF/lipids-  3/31/23 consultation- able to to stop the clodipogrel/plavix at that appt, 6 months out from Watchman procedure. They also discussed she should be on lifelong asa 81mg/d, and that prophylactic abx should be stopped.  She thought pt should continue to remain off the spironolactone that was stopped in the hospital.  Sees Dr. Rosa tomorrow-   --Ask about stopping asa prior to surgery.  --Ask about if okay to lower torsemide dose due to urinary urgency symptoms, lower BPs.  --Ask about if lipitor dose (even though low at 5mg/d) may be too high with low LDL?        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  Now s/p Watchman's procedure, and able to stop plavix at last cardiology visit on 3/31/23.  No dark/bloody stools, no abd pain.  She has been getting more constipated with recent use of the percocet, though, so has been taking a stool softener from [employer], unsure name.    --Fine to continue if colace, if not send msg.      --DM II/wt loss- mostly just on ozempic 1mg weekly.  Stopped basaglar since 3/23 hospitalization, and rare meal-time insulin.  Most glucose readings ~130s, occasionally 150s.  Instructed to take insulin if >150, so really hasn't.  Since hospital stay, only taken insulin 1-2 times.  A1C 7.3 recently.  Reviewed wt loss- loss really started ~10/20 when ozempic dose increased to 1mg weekly.  Did lose more with norovirus.  Suspect that will stabilize now.  --Okay to continue ozempic and prn use of meal-time insulin unless glucose readings increase again with increased appetite.    HTN/CKD-   Continued off the spironolactone per PAUL Soler.  [facility] if able to lower torsemide dose.    MDD- mood is good, though gets grumpy, though about the same as usual.  Pain really affects her mood.    --Will continue lexapro 20mg/d.      --Sjogrens/Inflam arthritis/osteopenia-   Cont follow-up with Dr. Lopez.  Cont follow-up with her for dexa/fosamax as well.      Recent set-backs with illnesses-  --Hospitalized in 3/23 with norovirus, further wt loss with inability to eat much at all even after getting home, took until about last week to start getting her appetite back.  Now feels like her appetite is getting back to close to normal.  --COVID dx right after last visit here- txt with monoclonal antibodies txt  Felt pretty sick for ~2 wks, better for the past week.      Other concerns today-   --Urination- urinary urgency, once she gets the urge, has to get there immediately.  Saw Dr. Nassar, and switched torsemide dosing to higher dose in am- 40mg in am and 30mg in pm, which helped some them. She also rec estradiol topically, but pt hasn't been using it lately.  She can't remember if taking estradiol helped or not- might retry.  --Will check ua, if neg for infx, restart estradiol and discuss torsemide with cardiology.  If still with sx's, send back to Dr. Nassar.    --Left ear drainage.  Wants a good exam of her ear.  Reviewed exam is normal today, minimal wax, wax drainage she is seeing is fine.  --Rash on left knee and R lateral ankle.  Rec vanicream or cera-ve.  --Toe - numbness and tingling for months.  B12 levels were low >1yr ago, but have been good to high in the past year. Likely peripheral neuropathy from DM II, though that has been under good control.  Foot exam okay today- sensation in toes intact bilaterally.    ANGELICA- pt notes that BiPap pressure seems too much lately?  Feels like her lungs purge air/pressure when she takes it off in am.  Last note of BiPap adjustment in chart ~2/21, but unable to see office notes.  Will  "refer back to sleep clinic to check BiPap pressures.    Finger dysfunction-   Unable to bend right 2nd finger at PIP joint. Unsure why or when it started. No known trauma, doesn't usually hurt unless she  something hard.    --Rec follow-up with hand ortho, but she has too many appointments to get to now, and it's not bothering her.  Prefers to hold off for now.      Patient has been advised of split billing requirements and indicates understanding: Yes      COUNSELING:  Reviewed preventive health counseling, as reflected in patient instructions      BMI:   Estimated body mass index is 28.47 kg/m  as calculated from the following:    Height as of 3/7/23: 1.651 m (5' 5\").    Weight as of 3/21/23: 77.6 kg (171 lb 1.6 oz).         She reports that she quit smoking about 11 years ago. Her smoking use included cigarettes. She started smoking about 53 years ago. She smoked an average of .5 packs per day. She has never used smokeless tobacco.      Appropriate preventive services were discussed with this patient, including applicable screening as appropriate for cardiovascular disease, diabetes, osteopenia/osteoporosis, and glaucoma.  As appropriate for age/gender, discussed screening for colorectal cancer, prostate cancer, breast cancer, and cervical cancer. Checklist reviewing preventive services available has been given to the patient.    Reviewed patients plan of care and provided an AVS. The Basic Care Plan (routine screening as documented in Health Maintenance) for Betty meets the Care Plan requirement. This Care Plan has been established and reviewed with the Patient and caregiver.      Ilene Tristan MD  [facility]    Identified Health Risks:    I have reviewed Opioid Use Disorder and Substance Use Disorder risk factors and made any needed referrals.     Answers for HPI/ROS submitted by the patient on 4/12/2023  If you checked off any problems, how difficult have these problems " made it for you to do your work, take care of things at home, or get along with other people?: Somewhat difficult  PHQ9 TOTAL SCORE: 6  DELORES 7 TOTAL SCORE: 1        She is at risk for lack of exercise and has been provided with information to increase physical activity for the benefit of her well-being.  The patient was counseled and encouraged to consider modifying their diet and eating habits. She was provided with information on recommended healthy diet options.  The patient reports that she has difficulty with activities of daily living. This has been addressed as much as it can be at this time, as pt is awaiting hip surgery.  The patient was provided with written information regarding signs of hearing loss.  Information on urinary incontinence and treatment options given to patient.  The patient's PHQ-9 score is consistent with mild depression. She is being treated and does not need further follow-up at this time.  She is at risk for falling and has been provided with information to reduce the risk of falling at home.

## 2023-04-13 ENCOUNTER — OFFICE VISIT (OUTPATIENT)
Dept: CARDIOLOGY | Facility: CLINIC | Age: 83
End: 2023-04-13
Attending: INTERNAL MEDICINE
Payer: MEDICARE

## 2023-04-13 ENCOUNTER — LAB (OUTPATIENT)
Dept: LAB | Facility: CLINIC | Age: 83
End: 2023-04-13
Payer: MEDICARE

## 2023-04-13 ENCOUNTER — PATIENT OUTREACH (OUTPATIENT)
Dept: GASTROENTEROLOGY | Facility: CLINIC | Age: 83
End: 2023-04-13

## 2023-04-13 VITALS
SYSTOLIC BLOOD PRESSURE: 107 MMHG | WEIGHT: 174.6 LBS | OXYGEN SATURATION: 96 % | HEIGHT: 66 IN | BODY MASS INDEX: 28.06 KG/M2 | HEART RATE: 57 BPM | DIASTOLIC BLOOD PRESSURE: 63 MMHG

## 2023-04-13 DIAGNOSIS — I50.32 CHRONIC HEART FAILURE WITH PRESERVED EJECTION FRACTION (H): ICD-10-CM

## 2023-04-13 DIAGNOSIS — R39.15 URINARY URGENCY: ICD-10-CM

## 2023-04-13 LAB
ALBUMIN UR-MCNC: 30 MG/DL
ANION GAP SERPL CALCULATED.3IONS-SCNC: 12 MMOL/L (ref 7–15)
APPEARANCE UR: ABNORMAL
BACTERIA #/AREA URNS HPF: ABNORMAL /HPF
BILIRUB UR QL STRIP: NEGATIVE
BUN SERPL-MCNC: 12 MG/DL (ref 8–23)
CALCIUM SERPL-MCNC: 9.2 MG/DL (ref 8.8–10.2)
CHLORIDE SERPL-SCNC: 104 MMOL/L (ref 98–107)
COLOR UR AUTO: YELLOW
CREAT SERPL-MCNC: 0.99 MG/DL (ref 0.51–0.95)
DEPRECATED HCO3 PLAS-SCNC: 25 MMOL/L (ref 22–29)
GFR SERPL CREATININE-BSD FRML MDRD: 57 ML/MIN/1.73M2
GLUCOSE SERPL-MCNC: 270 MG/DL (ref 70–99)
GLUCOSE UR STRIP-MCNC: NEGATIVE MG/DL
HGB UR QL STRIP: ABNORMAL
HYALINE CASTS: 22 /LPF
KETONES UR STRIP-MCNC: NEGATIVE MG/DL
LEUKOCYTE ESTERASE UR QL STRIP: ABNORMAL
MUCOUS THREADS #/AREA URNS LPF: PRESENT /LPF
NITRATE UR QL: NEGATIVE
NT-PROBNP SERPL-MCNC: 1004 PG/ML (ref 0–1800)
PH UR STRIP: 6 [PH] (ref 5–7)
POTASSIUM SERPL-SCNC: 3.6 MMOL/L (ref 3.4–5.3)
RBC URINE: 37 /HPF
SODIUM SERPL-SCNC: 141 MMOL/L (ref 136–145)
SP GR UR STRIP: 1.01 (ref 1–1.03)
SQUAMOUS EPITHELIAL: 2 /HPF
TRANSITIONAL EPI: 1 /HPF
UROBILINOGEN UR STRIP-MCNC: NORMAL MG/DL
WBC CLUMPS #/AREA URNS HPF: PRESENT /HPF
WBC URINE: 92 /HPF

## 2023-04-13 PROCEDURE — 99214 OFFICE O/P EST MOD 30 MIN: CPT | Performed by: INTERNAL MEDICINE

## 2023-04-13 PROCEDURE — 87088 URINE BACTERIA CULTURE: CPT | Performed by: FAMILY MEDICINE

## 2023-04-13 PROCEDURE — G0463 HOSPITAL OUTPT CLINIC VISIT: HCPCS | Performed by: INTERNAL MEDICINE

## 2023-04-13 PROCEDURE — 81001 URINALYSIS AUTO W/SCOPE: CPT | Performed by: PATHOLOGY

## 2023-04-13 PROCEDURE — 36415 COLL VENOUS BLD VENIPUNCTURE: CPT | Performed by: PATHOLOGY

## 2023-04-13 PROCEDURE — 80048 BASIC METABOLIC PNL TOTAL CA: CPT | Performed by: PATHOLOGY

## 2023-04-13 PROCEDURE — 83880 ASSAY OF NATRIURETIC PEPTIDE: CPT | Performed by: PATHOLOGY

## 2023-04-13 RX ORDER — TORSEMIDE 20 MG/1
20 TABLET ORAL 2 TIMES DAILY
Qty: 180 TABLET | Refills: 3 | Status: SHIPPED | OUTPATIENT
Start: 2023-04-13 | End: 2023-04-26

## 2023-04-13 ASSESSMENT — PAIN SCALES - GENERAL: PAINLEVEL: NO PAIN (0)

## 2023-04-13 NOTE — PROGRESS NOTES
Per Dr. Dias: Hgb remain stable and no further endoscopic intervention is needed.    Reached out to review with patient. LVM with clinic contact information.     Betty Cardenas RN Care Coordinator

## 2023-04-13 NOTE — NURSING NOTE
Diet: Patient instructed regarding a heart failure healthy diet, including discussion of reduced fat and 2000 mg daily sodium restriction, daily weights, medication purpose and compliance, fluid restrictions and resources for patient and family to access for assistance with heart failure management.       Labs: Patient was given results of the laboratory testing obtained today and patient was instructed about when to return for the next laboratory testing.     Med Reconcile: Reviewed and verified all current medications with the patient. The updated medication list was printed and given to the patient.     Med changes: decrease torsemide to 20 mg BID, keep holding spironolactone,     Return Appointment: Patient given instructions regarding scheduling next clinic visit: Follow up in September with labs and echo.    Patient stated she understood all health information given and agreed to call with further questions or concerns.     Flor Velasquez RN

## 2023-04-13 NOTE — LETTER
4/13/2023      RE: Betty Tee  [address]  [facility] [id number]       Dear Colleague,    Thank you for the opportunity to participate in the care of your patient, Betty Tee, at the [facility] [place] at [facility]. Please see a copy of my visit note below.    April 13, 2023    Follow up HFpEF    HPI:   Patient has interstitial lung disease (moderate restriction), Sjogren's syndrome, HTN, atrial fibrillation, diverticulitis s/p colectomy 2011 who I am following for HFpEF. She is here for routine HFpEF follow up.       Since last time I saw her she developed norovirus and then COVID-19 in March 2023.  She lost a lot of strength with this.        Her weight a few years ago was 210 pounds and currently is in the 170s..     She needs to have a right hip surgery which is scheduled to be in May.  She is not sleeping well due to pain in the right hip.     Overall her blood pressure has been a little softer, she has been off of spironolactone since she was sick in May.    She has only occasional lightheadedness, no syncope, no chest pain, no PND, no orthopnea, or lower extremity edema.      She does wear her CPAP mask routinely.     Of note in the last year she had recurrent GI bleeding and we placed a Watchman.  She does have paroxysmal atrial fibrillation she is only on baby aspirin.     She presently denies dark stools.  She generally feels weaker than she has in the past.     They have several questions today around holding aspirin potentially for her surgery, the results of of a UA from yesterday, whether she still needs to be on a statin medications and whether her diuretics could be decreased.     PAST MEDICAL HISTORY:  Past Medical History:   Diagnosis Date     Alcohol abuse, in remission      Allergic rhinitis, cause unspecified     allegra helps when she takes it     Antiplatelet or antithrombotic long-term use      Atrial  fibrillation (H)     in hosp in 11/11 after surgery w/ fluid overload     Cardiomegaly     LVH on stress echo- cardiac w/u at [facility]- neg CT scan for PE, neg stress echo in 8/06     Chest pain, unspecified      Congestive heart failure (H)      Depressive disorder      Diabetes (H)      Disorder of bone and cartilage, unspecified     osteopenia (had been on prempro), improved on 6/06 dexa, stable dexa 11/10     Diverticulosis of colon (without mention of hemorrhage)     last episode yrs ago     Essential hypertension, benign      Follicular bronchiolitis (H)     associated with Sjogrens, dx by chest CT showing mosaic attenuation and air trapping     Gastro-oesophageal reflux disease      ILD (interstitial lung disease) (H)     associated with Sjogrens, also has mildly elevated IgG4, first noted on chest CT 2015 (mild changes) and also has small airways disease; ILD improved on follow up chest CT 2018.     Insomnia, unspecified     weaned off clonazepam     Irregular heart beat      Lumbago 07/2009    MRI with DJD, now seeing Dr. Cain for sciatic sx's     Major depressive disorder, recurrent episode, moderate (H)      Obstructive sleep apnea     uses cpap     Osteoarthrosis, unspecified whether generalized or localized, unspecified site      Sjogren's syndrome (H)     + RG and SSA and lip bx     Sleep apnea     uses a cpap machine     Tobacco use disorder     chantix in 9/07, started again in 6/08, working     FAMILY HISTORY:  Mother had MI and CHF in her 60's. 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      CURRENT MEDICATIONS:  Current Outpatient Medications   Medication Sig Dispense Refill     ACCU-CHEK SHANNON PLUS test strip USE TO TEST BLOOD SUGARS 3 TIMES DAILY 300 strip 5     acetaminophen (TYLENOL) 500 MG tablet Take 1,000 mg by mouth every 8 hours as needed (max 6 tablets/24 hours, 2 tablets/dose)       acyclovir  (ZOVIRAX) 400 MG tablet Take 1 tablet (400 mg) by mouth 3 times daily as needed For a couple days 15 tablet 2     acyclovir (ZOVIRAX) 5 % external ointment Apply topically as needed (6 times day PRN for outbreaks) As needed for outbreaks 15 g 3     albuterol (PROAIR HFA/PROVENTIL HFA/VENTOLIN HFA) 108 (90 Base) MCG/ACT inhaler Inhale 2 puffs into the lungs every 6 hours 18 g 11     alendronate (FOSAMAX) 70 MG tablet Take 1 tablet (70 mg) by mouth every 7 days 4 tablet 1     allopurinol (ZYLOPRIM) 100 MG tablet Take 1 tablet (100 mg) by mouth daily 90 tablet 1     anakinra (KINERET) 100 MG/0.67ML SOSY injection 100 mg every day x 3 days as needed for flare ups 6.7 mL 3     aspirin (ASA) 81 MG EC tablet Take 1 tablet (81 mg) by mouth daily 90 tablet 1     atorvastatin (LIPITOR) 10 MG tablet TAKE 1/2 TABLET BY MOUTH EVERY DAY 45 tablet 0     azithromycin (ZITHROMAX) 250 MG tablet TAKE 1 TABLET BY MOUTH EVERY DAY 30 tablet 5     BD PEN NEEDLE JANE 2ND GEN 32G X 4 MM miscellaneous USE TO TEST 4 TIMES A  each 1     blood glucose (NO BRAND SPECIFIED) lancets standard Use to test blood sugar 3 times daily or as directed 100 lancet 3     cevimeline (EVOXAC) 30 MG capsule Take 1 capsule (30 mg) by mouth 3 times daily (Patient taking differently: Take 30 mg by mouth 3 times daily as needed) 90 capsule 11     Cholecalciferol (VITAMIN D3) 50 MCG (2000 UT) CAPS TAKE 100 MCG BY MOUTH DAILY (TAKE 2 TABLET (50 MCG) BY MOUTH DAILY - ORAL) 180 capsule 0     COMPOUNDED NON-CONTROLLED SUBSTANCE (CMPD RX) - PHARMACY TO MIX COMPOUNDED MEDICATION Estriol 1 mg/g Apply small amount to finger and apply to inside vagina daily for 2 weeks then twice weekly Route: vaginally Dispense 30 grams 11 refills 30 g 11     cyanocobalamin (VITAMIN B-12) 1000 MCG tablet Take 1 tablet (1,000 mcg) by mouth daily (Patient taking differently: Take 1,000 mcg by mouth three times a week) 30 tablet 1     escitalopram (LEXAPRO) 20 MG tablet TAKE 1 TABLET BY  MOUTH EVERY DAY 90 tablet 1     ferrous gluconate (FERGON) 324 (38 Fe) MG tablet Take 1 tablet (324 mg) by mouth daily (with breakfast) 90 tablet 1     fluticasone (FLONASE) 50 MCG/ACT nasal spray SPRAY 1-2 SPRAYS INTO BOTH NOSTRILS DAILY AS NEEDED FOR ALLERGIES 16 mL 11     fluticasone-vilanterol (BREO ELLIPTA) 100-25 MCG/INH inhaler Inhale 1 puff into the lungs daily 1 each 11     hydroxychloroquine (PLAQUENIL) 200 MG tablet Take 2 tablets (400 mg) by mouth daily Get annual eye exams for hydroxychloroquine (Plaquenil) monitoring and fax to [phone] 180 tablet 3     insulin lispro (HUMALOG KWIKPEN) 100 UNIT/ML (1 unit dial) KWIKPEN Inject 8 Units Subcutaneous 3 times daily (before meals)       ketoconazole (NIZORAL) 2 % external cream Apply topically 2 times daily as needed for itching 60 g 1     KLOR-CON 20 MEQ CR tablet TAKE 2 TABLETS (40 MEQ) BY MOUTH EVERY MORNING AND 1 TABLET (20 MEQ) EVERY EVENING. 270 tablet 3     lidocaine (LIDODERM) 5 % patch APPLY PATCH TO PAINFUL AREA FOR UP TO 12 H WITHIN A 24 H PERIOD. REMOVE AFTER 12 HOURS. (Patient taking differently: Apply patch to painful area for up to 12 h within a 24 h period.  Remove after 12 hours.) 30 patch 2     loratadine (CLARITIN) 10 MG tablet TAKE 1 TABLET BY MOUTH EVERY DAY 90 tablet 3     methocarbamol (ROBAXIN) 750 MG tablet Take 1 tablet (750 mg) by mouth 3 times daily as needed for muscle spasms 90 tablet 3     metoprolol succinate ER (TOPROL XL) 50 MG 24 hr tablet Take 1 tablet (50 mg) by mouth 2 times daily 90 tablet 3     montelukast (SINGULAIR) 10 MG tablet TAKE 1 TABLET BY MOUTH EVERYDAY AT BEDTIME 90 tablet 0     oxyCODONE-acetaminophen (PERCOCET) 7.5-325 MG per tablet Take 1 tablet by mouth 2 times daily as needed for severe pain (7-10) With at least 4 hours between doses. Fill and start 2/17/23 60 tablet 0     OZEMPIC, 1 MG/DOSE, 4 MG/3ML pen INJECT 1 MG SUBCUTANEOUS ONCE A WEEK 3 mL 1     pantoprazole (PROTONIX) 40 MG EC tablet Take 1  "tablet (40 mg) by mouth daily (replaces famotidine- should stop taking famotidine) 90 tablet 3     predniSONE (DELTASONE) 5 MG tablet Take 1 tablet (5 mg) by mouth daily 90 tablet 1     spironolactone (ALDACTONE) 25 MG tablet Take 2 tablets (50 mg) by mouth daily 180 tablet 3     torsemide (DEMADEX) 20 MG tablet TAKE 2 TABLETS (40 MG) BY MOUTH EVERY MORNING AND 1 TABLET (20 MG) DAILY AT 2 PM. TAKE THE AFTERNOON DOSE WITH AN EXTRA 10 MG TAB FOR A TOTAL OF 30 MG IN THE AFTERNOON 270 tablet 3     traZODone (DESYREL) 50 MG tablet TAKE 3 TABLETS (150 MG) BY MOUTH NIGHTLY AS NEEDED FOR SLEEP 270 tablet 0     ROS:   Constitutional: No fever, chills.  Generally losing weight over the last year and strength  ENT: eye hematoma ear ache, epistaxis, sore throat.   Allergies/Immunologic: Negative.   Respiratory: + intermittent cough, no hemoptysis.   Cardiovascular: As per HPI.   GI: No nausea, vomiting, stools have not been particularly dark  : No urinary frequency, dysuria, or hematuria.   Integument: Negative.   Psychiatric: Frustrated about how much pain she is in-not sleeping well  Neuro: Negative.   Endocrinology: Negative.   Musculoskeletal: Significant limiting right hip pain also has knee pains General imbalance    EXAM:  /63 (BP Location: Right arm, Patient Position: Chair, Cuff Size: Adult Regular)   Pulse 57   Ht 1.666 m (5' 5.59\")   Wt 79.2 kg (174 lb 9.6 oz)   SpO2 96%   BMI 28.53 kg/m    General: appears comfortable, alert and articulate  Head: normocephalic, atraumatic  Neck: no adenopathy  Orophyarynx: moist mucosa, no lesions, dentition intact  Heart: regular, S1/S2, no murmur, gallop, rub, estimated JVP <10   Lungs: diffuse expiratory wheezing, no crackles,no dullness   Abdomen: soft, non-tender, bowel sounds present, no hepatosplenomegaly  Extremities: trace LE edema, no clubbing, cyanosis.-Thin upper thighs  Neurological: normal speech and affect, no gross motor deficits  She is in a wheelchair " today    Labs:     Creatinine 1.03 potassium 3.6 NT proBNP 1004       Regadenosen MPI 2014: no fixed or inducible defects      Last EKG from 3/7/2023 is sinus rhythm with old anterior infarct pattern    Procedure  Echocardiogram with two-dimensional, color and spectral Doppler performed.  ______________________________________________________________________________  Interpretation Summary  Global and regional left ventricular function is normal with an EF of 55-60%.  The right ventricle is normal size. Global right ventricular function is  normal.  No significant valvular abnormalities.  IVC diameter >2.1 cm collapsing <50% with sniff suggests a high RA pressure  estimated at 15 mmHg or greater.  This study was compared with the study from 08/27/2020. The LA filling  pressures are higher. There are no significant changes in the biventricular  function or aortic calibers.  ______________________________________________________________________________          Assessment and Plan:  In summary this is a very pleasant 82 F with HFpEF who is here for follow up.     In support of the diagnosis of HFpEF includes mild LA enlargement, echocardiographic signs of increase LV filling pressures, increased nt-bnp, as well as a diuretic requirement and symptomatic improvement on diuretics.    The risk factors for HFpEF in her include age, gender, HTN, diabetes, sleep apnea and obesity.  A prior stress test was negative for CAD. She is presently euvolemic on exam and is NYHA class II(mutifactorial).    The mainstays of therapy for HFpEF include volume management, blood pressure control, treating underlying sleep apnea if present, weight management, exercise training, rate control for atrial fibrillation as well as consideration for a rhythm control strategy, as well as consideration for clinical trials.  I do have her on spironolactone given the results of the TOPCAT trial.  Guidelines also support the use of SGLT2 I and have  recently added a recommendation around Entresto.     In general with her significant weight loss after a hospitalization March have led to lower blood pressures, less insulin and  less diuretic requirements.      I am backing down her torsemide to 20 twice daily  I am stopping Aldactone for now  We will see what her weight trajectory is after her hip replacement I may add SGL 2 and Aldactone back if she ends up needing it post recovery       For now she is euvolemic  NYHA class II.       Atrial fibrillation [id number] (age >75, HTN, CHF, gender) -paroxysmal last-EKG  showing sinus rhythm  S/p watchman 9/22   On asprin 81 mg daily       Primary prevention: on statin, on ASA  -I do not want to lower her dose of statin from here as she is on such a low-dose already    Frailty-I think she will have the potential to  Correct this after her hip surgery    Hip surgery stratification; she is presently euvolemic on exam, her ejection fraction is normal-given her history of atrial fibrillation she is at moderate risk for postop atrial fibrillation and I think low risk for perioperative MI.  She can hold aspirin 1 week prior and resume after bleeding is controlled post OR.  Overall she is moderate risk for atrial fibrillation but overall low risk for hypotension and pulmonary edema.       Return to clinic in September 2023 with echo at that time.     MD Radha Crocker MD  Associate Professor of Medicine   [facility] Division of Cardiology         Please do not hesitate to contact me if you have any questions/concerns.     Sincerely,     Radha Rosa MD

## 2023-04-13 NOTE — NURSING NOTE
Chief Complaint   Patient presents with     Follow Up     [place]: RHF, Labs prior       Vitals were taken and medications reconciled.    Juan Diego Orta, EMT  12:54 PM

## 2023-04-13 NOTE — PROGRESS NOTES
April 13, 2023    Follow up HFpEF    HPI:   Patient has interstitial lung disease (moderate restriction), Sjogren's syndrome, HTN, atrial fibrillation, diverticulitis s/p colectomy 2011 who I am following for HFpEF. She is here for routine HFpEF follow up.       Since last time I saw her she developed norovirus and then COVID-19 in March 2023.  She lost a lot of strength with this.        Her weight a few years ago was 210 pounds and currently is in the 170s..     She needs to have a right hip surgery which is scheduled to be in May.  She is not sleeping well due to pain in the right hip.     Overall her blood pressure has been a little softer, she has been off of spironolactone since she was sick in May.    She has only occasional lightheadedness, no syncope, no chest pain, no PND, no orthopnea, or lower extremity edema.      She does wear her CPAP mask routinely.     Of note in the last year she had recurrent GI bleeding and we placed a Watchman.  She does have paroxysmal atrial fibrillation she is only on baby aspirin.     She presently denies dark stools.  She generally feels weaker than she has in the past.     They have several questions today around holding aspirin potentially for her surgery, the results of of a UA from yesterday, whether she still needs to be on a statin medications and whether her diuretics could be decreased.     PAST MEDICAL HISTORY:  Past Medical History:   Diagnosis Date     Alcohol abuse, in remission      Allergic rhinitis, cause unspecified     allegra helps when she takes it     Antiplatelet or antithrombotic long-term use      Atrial fibrillation (H)     in hosp in 11/11 after surgery w/ fluid overload     Cardiomegaly     LVH on stress echo- cardiac w/u at [facility] ER- neg CT scan for PE, neg stress echo in 8/06     Chest pain, unspecified      Congestive heart failure (H)      Depressive disorder      Diabetes (H)      Disorder of bone and cartilage, unspecified     osteopenia (had  been on prempro), improved on 6/06 dexa, stable dexa 11/10     Diverticulosis of colon (without mention of hemorrhage)     last episode yrs ago     Essential hypertension, benign      Follicular bronchiolitis (H)     associated with Sjogrens, dx by chest CT showing mosaic attenuation and air trapping     Gastro-oesophageal reflux disease      ILD (interstitial lung disease) (H)     associated with Sjogrens, also has mildly elevated IgG4, first noted on chest CT 2015 (mild changes) and also has small airways disease; ILD improved on follow up chest CT 2018.     Insomnia, unspecified     weaned off clonazepam     Irregular heart beat      Lumbago 07/2009    MRI with DJD, now seeing Dr. Cain for sciatic sx's     Major depressive disorder, recurrent episode, moderate (H)      Obstructive sleep apnea     uses cpap     Osteoarthrosis, unspecified whether generalized or localized, unspecified site      Sjogren's syndrome (H)     + RG and SSA and lip bx     Sleep apnea     uses a cpap machine     Tobacco use disorder     chantix in 9/07, started again in 6/08, working     FAMILY HISTORY:  Mother had MI and CHF in her 60's. 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      CURRENT MEDICATIONS:  Current Outpatient Medications   Medication Sig Dispense Refill     ACCU-CHEK SHANNON PLUS test strip USE TO TEST BLOOD SUGARS 3 TIMES DAILY 300 strip 5     acetaminophen (TYLENOL) 500 MG tablet Take 1,000 mg by mouth every 8 hours as needed (max 6 tablets/24 hours, 2 tablets/dose)       acyclovir (ZOVIRAX) 400 MG tablet Take 1 tablet (400 mg) by mouth 3 times daily as needed For a couple days 15 tablet 2     acyclovir (ZOVIRAX) 5 % external ointment Apply topically as needed (6 times day PRN for outbreaks) As needed for outbreaks 15 g 3     albuterol (PROAIR HFA/PROVENTIL HFA/VENTOLIN HFA) 108 (90 Base) MCG/ACT inhaler Inhale 2 puffs into the lungs  every 6 hours 18 g 11     alendronate (FOSAMAX) 70 MG tablet Take 1 tablet (70 mg) by mouth every 7 days 4 tablet 1     allopurinol (ZYLOPRIM) 100 MG tablet Take 1 tablet (100 mg) by mouth daily 90 tablet 1     anakinra (KINERET) 100 MG/0.67ML SOSY injection 100 mg every day x 3 days as needed for flare ups 6.7 mL 3     aspirin (ASA) 81 MG EC tablet Take 1 tablet (81 mg) by mouth daily 90 tablet 1     atorvastatin (LIPITOR) 10 MG tablet TAKE 1/2 TABLET BY MOUTH EVERY DAY 45 tablet 0     azithromycin (ZITHROMAX) 250 MG tablet TAKE 1 TABLET BY MOUTH EVERY DAY 30 tablet 5     BD PEN NEEDLE JANE 2ND GEN 32G X 4 MM miscellaneous USE TO TEST 4 TIMES A  each 1     blood glucose (NO BRAND SPECIFIED) lancets standard Use to test blood sugar 3 times daily or as directed 100 lancet 3     cevimeline (EVOXAC) 30 MG capsule Take 1 capsule (30 mg) by mouth 3 times daily (Patient taking differently: Take 30 mg by mouth 3 times daily as needed) 90 capsule 11     Cholecalciferol (VITAMIN D3) 50 MCG (2000 UT) CAPS TAKE 100 MCG BY MOUTH DAILY (TAKE 2 TABLET (50 MCG) BY MOUTH DAILY - ORAL) 180 capsule 0     COMPOUNDED NON-CONTROLLED SUBSTANCE (CMPD RX) - PHARMACY TO MIX COMPOUNDED MEDICATION Estriol 1 mg/g Apply small amount to finger and apply to inside vagina daily for 2 weeks then twice weekly Route: vaginally Dispense 30 grams 11 refills 30 g 11     cyanocobalamin (VITAMIN B-12) 1000 MCG tablet Take 1 tablet (1,000 mcg) by mouth daily (Patient taking differently: Take 1,000 mcg by mouth three times a week) 30 tablet 1     escitalopram (LEXAPRO) 20 MG tablet TAKE 1 TABLET BY MOUTH EVERY DAY 90 tablet 1     ferrous gluconate (FERGON) 324 (38 Fe) MG tablet Take 1 tablet (324 mg) by mouth daily (with breakfast) 90 tablet 1     fluticasone (FLONASE) 50 MCG/ACT nasal spray SPRAY 1-2 SPRAYS INTO BOTH NOSTRILS DAILY AS NEEDED FOR ALLERGIES 16 mL 11     fluticasone-vilanterol (BREO ELLIPTA) 100-25 MCG/INH inhaler Inhale 1 puff into  the lungs daily 1 each 11     hydroxychloroquine (PLAQUENIL) 200 MG tablet Take 2 tablets (400 mg) by mouth daily Get annual eye exams for hydroxychloroquine (Plaquenil) monitoring and fax to [phone] 180 tablet 3     insulin lispro (HUMALOG KWIKPEN) 100 UNIT/ML (1 unit dial) KWIKPEN Inject 8 Units Subcutaneous 3 times daily (before meals)       ketoconazole (NIZORAL) 2 % external cream Apply topically 2 times daily as needed for itching 60 g 1     KLOR-CON 20 MEQ CR tablet TAKE 2 TABLETS (40 MEQ) BY MOUTH EVERY MORNING AND 1 TABLET (20 MEQ) EVERY EVENING. 270 tablet 3     lidocaine (LIDODERM) 5 % patch APPLY PATCH TO PAINFUL AREA FOR UP TO 12 H WITHIN A 24 H PERIOD. REMOVE AFTER 12 HOURS. (Patient taking differently: Apply patch to painful area for up to 12 h within a 24 h period.  Remove after 12 hours.) 30 patch 2     loratadine (CLARITIN) 10 MG tablet TAKE 1 TABLET BY MOUTH EVERY DAY 90 tablet 3     methocarbamol (ROBAXIN) 750 MG tablet Take 1 tablet (750 mg) by mouth 3 times daily as needed for muscle spasms 90 tablet 3     metoprolol succinate ER (TOPROL XL) 50 MG 24 hr tablet Take 1 tablet (50 mg) by mouth 2 times daily 90 tablet 3     montelukast (SINGULAIR) 10 MG tablet TAKE 1 TABLET BY MOUTH EVERYDAY AT BEDTIME 90 tablet 0     oxyCODONE-acetaminophen (PERCOCET) 7.5-325 MG per tablet Take 1 tablet by mouth 2 times daily as needed for severe pain (7-10) With at least 4 hours between doses. Fill and start 2/17/23 60 tablet 0     OZEMPIC, 1 MG/DOSE, 4 MG/3ML pen INJECT 1 MG SUBCUTANEOUS ONCE A WEEK 3 mL 1     pantoprazole (PROTONIX) 40 MG EC tablet Take 1 tablet (40 mg) by mouth daily (replaces famotidine- should stop taking famotidine) 90 tablet 3     predniSONE (DELTASONE) 5 MG tablet Take 1 tablet (5 mg) by mouth daily 90 tablet 1     spironolactone (ALDACTONE) 25 MG tablet Take 2 tablets (50 mg) by mouth daily 180 tablet 3     torsemide (DEMADEX) 20 MG tablet TAKE 2 TABLETS (40 MG) BY MOUTH EVERY  "MORNING AND 1 TABLET (20 MG) DAILY AT 2 PM. TAKE THE AFTERNOON DOSE WITH AN EXTRA 10 MG TAB FOR A TOTAL OF 30 MG IN THE AFTERNOON 270 tablet 3     traZODone (DESYREL) 50 MG tablet TAKE 3 TABLETS (150 MG) BY MOUTH NIGHTLY AS NEEDED FOR SLEEP 270 tablet 0     ROS:   Constitutional: No fever, chills.  Generally losing weight over the last year and strength  ENT: eye hematoma ear ache, epistaxis, sore throat.   Allergies/Immunologic: Negative.   Respiratory: + intermittent cough, no hemoptysis.   Cardiovascular: As per HPI.   GI: No nausea, vomiting, stools have not been particularly dark  : No urinary frequency, dysuria, or hematuria.   Integument: Negative.   Psychiatric: Frustrated about how much pain she is in-not sleeping well  Neuro: Negative.   Endocrinology: Negative.   Musculoskeletal: Significant limiting right hip pain also has knee pains General imbalance    EXAM:  /63 (BP Location: Right arm, Patient Position: Chair, Cuff Size: Adult Regular)   Pulse 57   Ht 1.666 m (5' 5.59\")   Wt 79.2 kg (174 lb 9.6 oz)   SpO2 96%   BMI 28.53 kg/m    General: appears comfortable, alert and articulate  Head: normocephalic, atraumatic  Neck: no adenopathy  Orophyarynx: moist mucosa, no lesions, dentition intact  Heart: regular, S1/S2, no murmur, gallop, rub, estimated JVP <10   Lungs: diffuse expiratory wheezing, no crackles,no dullness   Abdomen: soft, non-tender, bowel sounds present, no hepatosplenomegaly  Extremities: trace LE edema, no clubbing, cyanosis.-Thin upper thighs  Neurological: normal speech and affect, no gross motor deficits  She is in a wheelchair today    Labs:     Creatinine 1.03 potassium 3.6 NT proBNP 1004       Regadenosen MPI 2014: no fixed or inducible defects      Last EKG from 3/7/2023 is sinus rhythm with old anterior infarct pattern    Procedure  Echocardiogram with two-dimensional, color and spectral Doppler " performed.  ______________________________________________________________________________  Interpretation Summary  Global and regional left ventricular function is normal with an EF of 55-60%.  The right ventricle is normal size. Global right ventricular function is  normal.  No significant valvular abnormalities.  IVC diameter >2.1 cm collapsing <50% with sniff suggests a high RA pressure  estimated at 15 mmHg or greater.  This study was compared with the study from 08/27/2020. The LA filling  pressures are higher. There are no significant changes in the biventricular  function or aortic calibers.  ______________________________________________________________________________          Assessment and Plan:  In summary this is a very pleasant 82 F with HFpEF who is here for follow up.     In support of the diagnosis of HFpEF includes mild LA enlargement, echocardiographic signs of increase LV filling pressures, increased nt-bnp, as well as a diuretic requirement and symptomatic improvement on diuretics.    The risk factors for HFpEF in her include age, gender, HTN, diabetes, sleep apnea and obesity.  A prior stress test was negative for CAD. She is presently euvolemic on exam and is NYHA class II(mutifactorial).    The mainstays of therapy for HFpEF include volume management, blood pressure control, treating underlying sleep apnea if present, weight management, exercise training, rate control for atrial fibrillation as well as consideration for a rhythm control strategy, as well as consideration for clinical trials.  I do have her on spironolactone given the results of the TOPCAT trial.  Guidelines also support the use of SGLT2 I and have recently added a recommendation around Entresto.     In general with her significant weight loss after a hospitalization March have led to lower blood pressures, less insulin and  less diuretic requirements.      I am backing down her torsemide to 20 twice daily  I am stopping  Aldactone for now  We will see what her weight trajectory is after her hip replacement I may add SGL 2 and Aldactone back if she ends up needing it post recovery       For now she is euvolemic  NYHA class II.       Atrial fibrillation [id number] (age >75, HTN, CHF, gender) -paroxysmal last-EKG  showing sinus rhythm  S/p watchman 9/22   On asprin 81 mg daily       Primary prevention: on statin, on ASA  -I do not want to lower her dose of statin from here as she is on such a low-dose already    Frailty-I think she will have the potential to  Correct this after her hip surgery    Hip surgery stratification; she is presently euvolemic on exam, her ejection fraction is normal-given her history of atrial fibrillation she is at moderate risk for postop atrial fibrillation and I think low risk for perioperative MI.  She can hold aspirin 1 week prior and resume after bleeding is controlled post OR.  Overall she is moderate risk for atrial fibrillation but overall low risk for hypotension and pulmonary edema.       Return to clinic in September 2023 with echo at that time.     MD Radha Crocker MD  Associate Professor of Medicine   [facility] Division of Cardiology

## 2023-04-14 ENCOUNTER — TELEPHONE (OUTPATIENT)
Dept: FAMILY MEDICINE | Facility: CLINIC | Age: 83
End: 2023-04-14
Payer: MEDICARE

## 2023-04-14 DIAGNOSIS — N30.00 ACUTE CYSTITIS WITHOUT HEMATURIA: Primary | ICD-10-CM

## 2023-04-14 RX ORDER — SULFAMETHOXAZOLE/TRIMETHOPRIM 800-160 MG
1 TABLET ORAL 2 TIMES DAILY
Qty: 6 TABLET | Refills: 0 | Status: SHIPPED | OUTPATIENT
Start: 2023-04-14 | End: 2023-04-17

## 2023-04-14 NOTE — TELEPHONE ENCOUNTER
CW,    Patient called   Concerned about her UA results from yesterday   Please review when you have a moment  Patient is wondering if she needs antibiotics?      Thanks,  Aubrey HARGROVE RN   [facility]

## 2023-04-14 NOTE — TELEPHONE ENCOUNTER
Called pt to discuss...  UA is suspicious, but reviewed her chart, few abnl UA's in past, but Ucx have never been positive for infx.  Pt feels increased freq is more in the past few days, though.   No other sx's.  Ucx not back yet.  Discussed will send in rx for bactrim- would start if sx's worsen at all, or if she sees ucx come back and is positive.  Send msg on Monday with updates.  Pt agrees with plan.  CW

## 2023-04-15 LAB — BACTERIA UR CULT: ABNORMAL

## 2023-04-17 RX ORDER — NITROFURANTOIN 25; 75 MG/1; MG/1
100 CAPSULE ORAL 2 TIMES DAILY
Qty: 10 CAPSULE | Refills: 0 | Status: SHIPPED | OUTPATIENT
Start: 2023-04-17 | End: 2023-04-22

## 2023-04-17 NOTE — TELEPHONE ENCOUNTER
Called and discussed with pt, ucx positive, best treated by certain abx (not the bactrim that was sent - which she confirmed she hadn't started).  Will send in rx for nitrofurantoin x 5 days, and she'll take that course.    CW

## 2023-04-17 NOTE — TELEPHONE ENCOUNTER
Reviewed ucx, positive for less common enterococcus faecalis, and abx sensitivities only include ampicillin, penicillin, nitrofurantoin and vanco (only ID).  Leaves us few options as she has issues with pcn/amox (though possibly only GI sx's per details).   The bactrim we sent in last week (unsure if she started it or not) will likely not be that helpful.  Could try nitrofurantoin based on ucx results if she is still having urinary sx's.      Tried to call and reach her on her mobile number, but no answer.  Will try back later.  Triage- if she calls in the meantime, please ask if she started the bactrim or not, and how her urinary sx's are now.    Thanks- CW

## 2023-04-17 NOTE — TELEPHONE ENCOUNTER
Patient returned call  Has not started Bactrim yet  Is still having the same urinary symptoms  Also provided other updates noted below  Thanks,  Elise RIVERA RN

## 2023-04-21 DIAGNOSIS — M85.80 OSTEOPENIA, UNSPECIFIED LOCATION: ICD-10-CM

## 2023-04-21 DIAGNOSIS — Z79.52 CURRENT CHRONIC USE OF SYSTEMIC STEROIDS: ICD-10-CM

## 2023-04-24 DIAGNOSIS — Z79.4 TYPE 2 DIABETES MELLITUS WITH HYPERGLYCEMIA, WITH LONG-TERM CURRENT USE OF INSULIN (H): ICD-10-CM

## 2023-04-24 DIAGNOSIS — E11.65 TYPE 2 DIABETES MELLITUS WITH HYPERGLYCEMIA, WITH LONG-TERM CURRENT USE OF INSULIN (H): ICD-10-CM

## 2023-04-24 RX ORDER — ALENDRONATE SODIUM 70 MG/1
TABLET ORAL
Qty: 4 TABLET | Refills: 1 | Status: SHIPPED | OUTPATIENT
Start: 2023-04-24 | End: 2023-05-24

## 2023-04-24 NOTE — TELEPHONE ENCOUNTER
1/13/2023  [facility] Zulma Jaramillo MD  Rheumatology    alendronate (FOSAMAX) 70 MG tablet    Routed because: creat H ordered by other provider.dexa past due. rf?

## 2023-04-24 NOTE — TELEPHONE ENCOUNTER
Routing refill request to provider for review/approval because:  Labs out of range:  creatinine.  Sandra ANTUNEZ RN

## 2023-04-25 RX ORDER — SEMAGLUTIDE 1.34 MG/ML
1 INJECTION, SOLUTION SUBCUTANEOUS WEEKLY
Qty: 9 ML | Refills: 1 | Status: SHIPPED | OUTPATIENT
Start: 2023-04-25 | End: 2023-12-07 | Stop reason: DRUGHIGH

## 2023-04-26 ENCOUNTER — TELEPHONE (OUTPATIENT)
Dept: CARDIOLOGY | Facility: CLINIC | Age: 83
End: 2023-04-26
Payer: MEDICARE

## 2023-04-26 DIAGNOSIS — I50.32 CHRONIC HEART FAILURE WITH PRESERVED EJECTION FRACTION (H): Primary | ICD-10-CM

## 2023-04-26 DIAGNOSIS — I48.0 PAROXYSMAL ATRIAL FIBRILLATION (H): ICD-10-CM

## 2023-04-26 RX ORDER — TORSEMIDE 20 MG/1
TABLET ORAL
Qty: 270 TABLET | Refills: 3
Start: 2023-04-26 | End: 2024-04-25

## 2023-04-26 NOTE — TELEPHONE ENCOUNTER
Reviewed with Dr. Rosa.   Date: 4/26/2023    Time of Call: 12:05 PM     Diagnosis:  HF     [ VORB ] Ordering provider: Dr Rosa  Order: Increase Torsemide to 40/20. BMP in one week. CORE in one month.      Order received by: Beatriz Blanco RN      Follow-up/additional notes: Reviewed with Nghia who verbalized understanding. Scheduled CORE in one month.

## 2023-04-26 NOTE — TELEPHONE ENCOUNTER
[facility] Call Center    Phone Message    May a detailed message be left on voicemail: yes     Reason for Call: Other: Please call Nghia . Betty saw Dr Rosa on April 13th. Torsomide was reduced by 20 mg in am and 10 mg in pm. Patient has gained 8 pounds and her ankles are swelling. Should they increase her torsemide. Please with Ngiha as she manages patients pills     Action Taken: Other: cardio    Travel Screening: Not Applicable

## 2023-04-27 ENCOUNTER — TELEPHONE (OUTPATIENT)
Dept: PHARMACY | Facility: CLINIC | Age: 83
End: 2023-04-27
Payer: MEDICARE

## 2023-04-27 NOTE — TELEPHONE ENCOUNTER
Called preferred number in chart which goes to her friend Nghia, who helps manage health/meds. Nghia reports BG all staying under 130. Swelling better after one dose of increased dose of torsemide dosing. No changes needed, MTM to follow-up in about a month (prior to surgery at end of May and pre-op physical on 5/10 with CW).      Sabrina Meek, MarcosD, JAMES, BCACP  [facility] Pharmacist, [facility]

## 2023-05-03 DIAGNOSIS — I50.30 HEART FAILURE WITH PRESERVED EJECTION FRACTION, UNSPECIFIED HF CHRONICITY (H): ICD-10-CM

## 2023-05-03 DIAGNOSIS — I48.21 PERMANENT ATRIAL FIBRILLATION (H): ICD-10-CM

## 2023-05-04 ENCOUNTER — LAB (OUTPATIENT)
Dept: LAB | Facility: CLINIC | Age: 83
End: 2023-05-04
Payer: MEDICARE

## 2023-05-04 DIAGNOSIS — I50.32 CHRONIC HEART FAILURE WITH PRESERVED EJECTION FRACTION (H): ICD-10-CM

## 2023-05-04 DIAGNOSIS — J44.89 FOLLICULAR BRONCHIOLITIS (H): ICD-10-CM

## 2023-05-04 LAB
ANION GAP SERPL CALCULATED.3IONS-SCNC: 7 MMOL/L (ref 7–15)
BUN SERPL-MCNC: 16.4 MG/DL (ref 8–23)
CALCIUM SERPL-MCNC: 9.3 MG/DL (ref 8.8–10.2)
CHLORIDE SERPL-SCNC: 105 MMOL/L (ref 98–107)
CREAT SERPL-MCNC: 0.92 MG/DL (ref 0.51–0.95)
DEPRECATED HCO3 PLAS-SCNC: 29 MMOL/L (ref 22–29)
GFR SERPL CREATININE-BSD FRML MDRD: 62 ML/MIN/1.73M2
GLUCOSE SERPL-MCNC: 180 MG/DL (ref 70–99)
POTASSIUM SERPL-SCNC: 3.7 MMOL/L (ref 3.4–5.3)
SODIUM SERPL-SCNC: 141 MMOL/L (ref 136–145)

## 2023-05-04 PROCEDURE — 80048 BASIC METABOLIC PNL TOTAL CA: CPT | Performed by: PATHOLOGY

## 2023-05-04 PROCEDURE — 36415 COLL VENOUS BLD VENIPUNCTURE: CPT | Performed by: PATHOLOGY

## 2023-05-04 RX ORDER — AZITHROMYCIN 250 MG/1
TABLET, FILM COATED ORAL
Qty: 30 TABLET | Refills: 5 | Status: SHIPPED | OUTPATIENT
Start: 2023-05-04 | End: 2023-12-22

## 2023-05-05 NOTE — TELEPHONE ENCOUNTER
05/05/2023 Nghia Called in because she is not clear on the increase amount I informed her that the note staed 40 mg and she said that cannot be right and is asking for a call back . Please follow up and confirm the information that was discussed.

## 2023-05-05 NOTE — TELEPHONE ENCOUNTER
Reviewed with Dr. Rosa:  Date: 5/5/2023    Time of Call: 3:10 PM     Diagnosis:  HF     [ VORB ] Ordering provider: Dr Rosa  Order: Increase Torsemide to 40 mg BID through Monday. Try to get sooner appointment in CORE. At next appointment get EKG and Zio      Order received by: Beatriz Blanco RN      Follow-up/additional notes: Reviewed with Nghia who verbalized understanding. Will call Tuesday to check in. Currently scheduled for 5/24 which is next core available. Will watch for any sooner and schedule in. Messaged schedulers to add to wait list.

## 2023-05-05 NOTE — TELEPHONE ENCOUNTER
Called Nghia back.   Last week c/o weight gain of 8 lbs and increased swelling in legs. Torsemide to 40/20 with plans to get labs after 1 week. Also taking Potassium 40/20.     Got labs yesterday which are stable.   The Torsemide increase didn't change weight. Still up about 8 lbs at 178. No shortness of breath. The swelling in legs is about the same as it was last week. .   Feels like she's having more afib. She has always been able to feel when she goes in and out of it, thinks she's having more episodes recently. She lies down with cpap on and that relieves it.     Will review with provider. Beatriz Blanco RN

## 2023-05-05 NOTE — TELEPHONE ENCOUNTER
Call back from Nghia. She talked to Betty this afternoon who had just stepped on scale and was actually down 5 of the 8 lbs she gained and is down to 173 lbs. Betty also told her she can't take extra lasix in the afternoon/evening this weekend because she has a gala tonight and events Saturday and Sunday night.   We discussed that if weight goes up can still take the extra as ordered, otherwise I would check in Monday for an update on weights and symptoms.     Am looking for a sooner CORE spot and added her to the wait list.

## 2023-05-05 NOTE — TELEPHONE ENCOUNTER
[facility] Call Center    Phone Message    May a detailed message be left on voicemail: yes     Reason for Call: Other:      Nghia is returning call to inform that weight has stayed the same with increase of diuretics, pt is experiencing afib more frequently which does not last a long time.  Will pass with rest.    Action Taken: Other: cardio    Travel Screening: Not Applicable     Thank you!  [facility]

## 2023-05-08 RX ORDER — METOPROLOL SUCCINATE 50 MG/1
50 TABLET, EXTENDED RELEASE ORAL 2 TIMES DAILY
Qty: 180 TABLET | Refills: 3 | Status: SHIPPED | OUTPATIENT
Start: 2023-05-08 | End: 2024-05-07

## 2023-05-08 NOTE — TELEPHONE ENCOUNTER
Called Nghia to check in on Betty. On Friday initially recommended increase of Torsemide from 40/20 to 40 mg BID through weekend. Nghia called back after this to report weight had gone down from the high of 178 to 173, and Betty had a lot of events over weekend and could not take extra diuretic anyways. I told them I would check in on Monday for an update.    She reports weight this morning is 176. No swelling of legs. Her breathing feels fine. She had some birthday events for herself over the weekend with some dietary indiscretion.   She reports she had no episodes of afib over the weekend.   Nghia reports weight has fluctuated recently anywhere from high 160s -mid 170s. Was 174 when she saw Dr. Rosa.     I told Ngiha I would review with Dr. Rosa for any recommendations. Nghia said if any extra diuretic is recommended, later in the week would be better as they have a board meeting for the [place] all day tomorrow.     Nghia requests I call her either between 12:30 - 3 today, or after 3:30 Tuesday.     Will review with provider.   Beatriz Blanco RN

## 2023-05-09 NOTE — MR AVS SNAPSHOT
After Visit Summary   10/6/2017    Betty Tee    MRN: [id number]           Patient Information     Date Of Birth          1940        Visit Information        Provider Department      10/6/2017 1:00 PM Ilene Tristan MD [facility]        Today's Diagnoses     Leukocytosis, unspecified type    -  1    Acute on chronic heart failure, unspecified heart failure type (H)        Acute and chronic respiratory failure with hypoxia (H)        (HFpEF) heart failure with preserved ejection fraction (H)        Atrial fibrillation with controlled ventricular response (H)        Essential hypertension with goal blood pressure less than 140/90        Type 2 diabetes mellitus with hyperglycemia, with long-term current use of insulin (H)        ILD (interstitial lung disease) (H)        Long term current use of anticoagulant therapy        Major depressive disorder, recurrent episode, moderate        Hyperlipidemia LDL goal <100          Care Instructions    Plan-  Paxil wean-  First week- lower paxil dose to 5mg/day (1/2 tab daily).  Second week- paxil 5mg alternating with lexapro 10mg- take one tab daily of either paxil 1/2 tab or lexapro full tab.  Third week- take lexapro 10mg daily (and continue on this dose).    Follow-up here in six weeks to reassess mood.          Follow-ups after your visit        Your next 10 appointments already scheduled     Oct 17, 2017 10:30 AM CDT   Ech Complete with [address] [facility] ([facility])    [address] [place] [id number] [phone]           1. Please bring or wear a comfortable two-piece outfit. 2. You may eat, drink and take your normal medicines. 3. For any questions that cannot be answered, please contact the ordering physician            Oct 26, 2017  2:30 PM CDT   Lab with [facility] ([facility])    [address] [place] [address]  Called patient to schedule Colpo, patient did not answer and I left a voicemail.   [facility] [id number]   [phone]            Oct 26, 2017  3:00 PM CDT   (Arrive by 2:45 PM)   RETURN HEART FAILURE with Radha Rosa MD   [facility] ([facility])    [address] [place] [id number]   [phone]            Nov 15, 2017  3:30 PM CST   FULL PULMONARY FUNCTION with [facility] PFL D   [facility] Pulmonary Function Testing ([facility])    [address] [place] [id number] [phone]            Nov 15, 2017  4:30 PM CST   (Arrive by 4:15 PM)   Return Interstitial Lung with Meño Walsh MD   [facility] ([facility])    [address] [place] [id number] [phone]              Who to contact     If you have questions or need follow up information about today's clinic visit or your schedule please contact [facility] directly at [phone].  Normal or non-critical lab and imaging results will be communicated to you by [employer], letter or phone within 4 business days after the clinic has received the results. If you do not hear from us within 7 days, please contact the clinic through [employer] or phone. If you have a critical or abnormal lab result, we will notify you by phone as soon as possible.  Submit refill requests through [employer] or call your pharmacy and they will forward the refill request to us. Please allow 3 business days for your refill to be completed.          Additional Information About Your Visit        [employer] Information     [employer] gives you secure access to your electronic health record. If you see a primary care provider, you can also send messages to your care team and make appointments. If you have questions, please call your primary care clinic.  If you do not have a primary care provider, please call [phone] and they will assist you.        Care EveryWhere  "ID     This is your Care EveryWhere ID. This could be used by other organizations to access your [facility] medical records  [id number]        Your Vitals Were     Pulse Temperature Respirations Height Pulse Oximetry BMI (Body Mass Index)    94 98.4  F (36.9  C) (Oral) 20 5' 6\" (1.676 m) 94% 33.25 kg/m2       Blood Pressure from Last 3 Encounters:   10/06/17 110/60   10/04/17 125/82   09/12/17 90/46    Weight from Last 3 Encounters:   10/06/17 206 lb (93.4 kg)   10/04/17 205 lb (93 kg)   09/12/17 206 lb (93.4 kg)              We Performed the Following     Albumin Random Urine Quantitative with Creat Ratio     CBC with platelets and differential     Comprehensive metabolic panel (BMP + Alb, Alk Phos, ALT, AST, Total. Bili, TP)     Hemoglobin A1c     INR point of care          Today's Medication Changes          These changes are accurate as of: 10/6/17  2:21 PM.  If you have any questions, ask your nurse or doctor.               Start taking these medicines.        Dose/Directions    escitalopram 10 MG tablet   Commonly known as:  LEXAPRO   Used for:  Major depressive disorder, recurrent episode, moderate (H)   Started by:  Ilene Tristan MD        Dose:  10 mg   Take 1 tablet (10 mg) by mouth daily   Quantity:  30 tablet   Refills:  1         These medicines have changed or have updated prescriptions.        Dose/Directions    acyclovir 5 % ointment   Commonly known as:  ZOVIRAX   This may have changed:  additional instructions   Used for:  Recurrent cold sores        Apply topically 6 times daily   Quantity:  15 g   Refills:  3       fluticasone 50 MCG/ACT spray   Commonly known as:  FLONASE   This may have changed:    - when to take this  - reasons to take this   Used for:  Seasonal allergic rhinitis        Dose:  1-2 spray   Spray 1-2 sprays into both nostrils daily   Quantity:  16 g   Refills:  5            Where to get your medicines      These medications were sent to [facility]/pharmacy #[id number] - " [place], [place] - [address] [facility]  [address] [place] [address] [place] [address]     Phone:  [phone]     atorvastatin 20 MG tablet    escitalopram 10 MG tablet    potassium chloride SA 20 MEQ CR tablet                Primary Care Provider Office Phone # Fax #    Ilene Tristan [phone] [address] [address]        Equal Access to Services     GENNY STONER : Hadii aad ku hadasho Soomaali, waaxda luqadaha, qaybta kaalmada adeegyada, waxay idiin hayaan adeeg kharash la'sanket . So [facility] [place] [phone].    ATENCIÓN: Si peggyla espdesiree, tiene a conti disposición servicios gratuitos de asistencia lingüística. [facility] [phone].    We comply with applicable federal civil rights laws and [place] laws. We do not discriminate on the basis of race, color, national origin, age, disability, sex, sexual orientation, or gender identity.            Thank you!     Thank you for choosing [facility]  for your care. Our goal is always to provide you with excellent care. Hearing back from our patients is one way we can continue to improve our services. Please take a few minutes to complete the written survey that you may receive in the mail after your visit with us. Thank you!             Your Updated Medication List - Protect others around you: Learn how to safely use, store and throw away your medicines at www.disposemymeds.org.          This list is accurate as of: 10/6/17  2:21 PM.  Always use your most recent med list.                   Brand Name Dispense Instructions for use Diagnosis    acyclovir 400 MG tablet    ZOVIRAX     Take 400 mg by mouth See Admin Instructions 5 times daily as needed for outbreaks        acyclovir 5 % ointment    ZOVIRAX    15 g    Apply topically 6 times daily    Recurrent cold sores       albuterol 108 (90 BASE) MCG/ACT Inhaler    PROAIR HFA/PROVENTIL HFA/VENTOLIN HFA    1 Inhaler    Inhale 2 puffs into the lungs  every 6 hours    ILD (interstitial lung disease) (H)       atorvastatin 20 MG tablet    LIPITOR    90 tablet    Take 1 tablet (20 mg) by mouth daily    Hyperlipidemia LDL goal <100       azithromycin 250 MG tablet    ZITHROMAX    6 tablet    Two tablets first day, then one tablet daily for four days.    Acute recurrent maxillary sinusitis       * blood glucose monitoring lancets     4 Box    Use to test blood sugar 4 times daily or as directed.    Type 2 diabetes mellitus without complication, without long-term current use of insulin (H)       * blood glucose monitoring lancets     1 Box    Use to test blood sugar 4 times daily or as directed.  Ok to substitute alternative if insurance prefers.    Type 2 diabetes mellitus without complication, without long-term current use of insulin (H)       * blood glucose monitoring test strip    no brand specified    300 strip    Use to test blood sugars 4 times daily or as directed    Type 2 diabetes mellitus without complication, without long-term current use of insulin (H)       * blood glucose monitoring test strip    ACCU-CHEK SHANNON PLUS    120 strip    Use to test blood sugar 4 times daily or as directed.  Ok to substitute alternative if insurance prefers.    Type 2 diabetes mellitus without complication, without long-term current use of insulin (H)       COMPRESSION STOCKINGS     2 each    Wear compression stockings in affected leg (right leg) or both legs most of the time during the day and take them off at night.    DVT (deep venous thrombosis), right, Postphlebitic syndrome, Chronic anticoagulation, Atrial fibrillation (H)       escitalopram 10 MG tablet    LEXAPRO    30 tablet    Take 1 tablet (10 mg) by mouth daily    Major depressive disorder, recurrent episode, moderate (H)       fluticasone 220 MCG/ACT Inhaler    FLOVENT HFA    3 Inhaler    Inhale 2 puffs into the lungs 2 times daily    ILD (interstitial lung disease) (H), Follicular bronchiolitis (H)        fluticasone 50 MCG/ACT spray    FLONASE    16 g    Spray 1-2 sprays into both nostrils daily    Seasonal allergic rhinitis       furosemide 40 MG tablet    LASIX    180 tablet    Take 1 tablet (40 mg) by mouth 2 times daily    (HFpEF) heart failure with preserved ejection fraction (H)       * insulin pen needle 31G X 8 MM    B-D U/F    400 each    Use 4 times daily (with Lantus and Novolog) or as directed.    Type 2 diabetes mellitus without complication, without long-term current use of insulin (H)       * insulin pen needle 32G X 4 MM    BD JANE U/F    200 each    Use 4 daily as directed.    Type 2 diabetes mellitus without complication, without long-term current use of insulin (H)       ketoconazole 2 % cream    NIZORAL    60 g    Apply topically 2 times daily    Cutaneous candidiasis       LANTUS SOLOSTAR 100 UNIT/ML injection   Generic drug:  insulin glargine     15 mL    INJECT 25 UNTIS SUBCUTANEOUSLY EVERY DAY    Type 2 diabetes mellitus with hyperglycemia, with long-term current use of insulin (H)       lisinopril 2.5 MG tablet    PRINIVIL/Zestril    90 tablet    Take 1 tablet (2.5 mg) by mouth daily    Essential hypertension with goal blood pressure less than 140/90       metFORMIN 500 MG 24 hr tablet    GLUCOPHAGE-XR    180 tablet    TAKE 2 TABLETS (1,000 MG) BY MOUTH DAILY (WITH DINNER)    Type 2 diabetes mellitus without complication, without long-term current use of insulin (H)       metoprolol 100 MG 24 hr tablet    TOPROL-XL    90 tablet    Take 1 tablet (100 mg) by mouth daily    Atrial fibrillation with controlled ventricular response (H)       montelukast 10 MG tablet    SINGULAIR    90 tablet    Take 1 tablet (10 mg) by mouth At Bedtime    Sjogren's syndrome with lung involvement (H), ILD (interstitial lung disease) (H), Chronic seasonal allergic rhinitis due to other allergen       NovoLOG FLEXPEN 100 UNIT/ML injection   Generic drug:  insulin aspart     15 mL    INJECT 12 UNITS SUBCUTANEOUS 3  TIMES DAILY (WITH MEALS)    Type 2 diabetes mellitus with other specified complication (H)       order for DME     1 Device    Oxygen: Patient requires supplemental Oxygen 4 LPM via nasal canula with activity. Please provide patient with a home unit and with portability capability. Oxygen will be for a lifetime.    Hypoxia, ILD (interstitial lung disease) (H)       pantoprazole 20 MG EC tablet    PROTONIX    60 tablet    Take 1-2 tablets (20-40 mg) by mouth daily    Gastroesophageal reflux disease without esophagitis       PARoxetine 10 MG tablet    PAXIL    30 tablet    TAKE 1 TABLET (10MG) BY MOUTH AT BEDTIME    Major depressive disorder, recurrent episode, moderate (H)       polyethylene glycol Packet    MIRALAX/GLYCOLAX    7 packet    Take 17 g by mouth daily as needed for constipation    Constipation, unspecified constipation type       potassium chloride SA 20 MEQ CR tablet    K-DUR/KLOR-CON M    180 tablet    Take 2 tablets (40 mEq) by mouth daily    Acute on chronic heart failure, unspecified heart failure type (H), Acute and chronic respiratory failure with hypoxia (H), (HFpEF) heart failure with preserved ejection fraction (H)       predniSONE 10 MG tablet    DELTASONE    90 tablet    Take 2 tablets for three days (4/12-14), then take 1 tablet daily (10 mg daily starting 4/15)    ILD (interstitial lung disease) (H), Sjogren's syndrome (H)       spironolactone 25 MG tablet    ALDACTONE    180 tablet    Take 2 tablets (50 mg) by mouth daily    Chronic diastolic congestive heart failure (H)       traZODone 50 MG tablet    DESYREL    90 tablet    TAKE 1/2-1 TABLET BY MOUTH NIGHTLY AS NEEDED, CAN TITRATE DOWN TO 1/2TAB OR UP TO 2TABS AS NEEDED    Insomnia due to medical condition       TYLENOL 500 MG tablet   Generic drug:  acetaminophen      Take 500 mg by mouth nightly as needed    Dizziness       warfarin 7.5 MG tablet    COUMADIN    100 tablet    Current dose is 7.5 mg daily.  Dose adjusted per INR result.     Atrial fibrillation with controlled ventricular response (H)       * Notice:  This list has 6 medication(s) that are the same as other medications prescribed for you. Read the directions carefully, and ask your doctor or other care provider to review them with you.

## 2023-05-09 NOTE — TELEPHONE ENCOUNTER
Offered [facility] 5/10, 5/11 and 5/12.  They were unable to make any of those dates work with the times available.      Date: 5/10/2023    Time of Call: 10:27 AM     Diagnosis:  Heart failure     [ VORB ] Ordering provider: Dr Rosa    Order: ok to take an extra 10 mg of torsemide in the afternoon for weight gain, swelling or shortness of breath as needed prior to being seen in [facility]     Order received by: Flor Velasquez RN       Follow-up/additional notes:

## 2023-05-10 ENCOUNTER — OFFICE VISIT (OUTPATIENT)
Dept: FAMILY MEDICINE | Facility: CLINIC | Age: 83
End: 2023-05-10
Payer: MEDICARE

## 2023-05-10 VITALS
BODY MASS INDEX: 27.97 KG/M2 | WEIGHT: 174 LBS | SYSTOLIC BLOOD PRESSURE: 128 MMHG | OXYGEN SATURATION: 97 % | HEART RATE: 52 BPM | RESPIRATION RATE: 16 BRPM | TEMPERATURE: 97.7 F | DIASTOLIC BLOOD PRESSURE: 62 MMHG | HEIGHT: 66 IN

## 2023-05-10 DIAGNOSIS — M35.02 SJOGREN'S SYNDROME WITH LUNG INVOLVEMENT (H): ICD-10-CM

## 2023-05-10 DIAGNOSIS — K31.819 GAVE (GASTRIC ANTRAL VASCULAR ECTASIA): ICD-10-CM

## 2023-05-10 DIAGNOSIS — I48.0 PAROXYSMAL ATRIAL FIBRILLATION (H): ICD-10-CM

## 2023-05-10 DIAGNOSIS — Z01.818 PREOP GENERAL PHYSICAL EXAM: Primary | ICD-10-CM

## 2023-05-10 DIAGNOSIS — N18.32 STAGE 3B CHRONIC KIDNEY DISEASE (H): ICD-10-CM

## 2023-05-10 DIAGNOSIS — I10 ESSENTIAL HYPERTENSION WITH GOAL BLOOD PRESSURE LESS THAN 140/90: ICD-10-CM

## 2023-05-10 DIAGNOSIS — B00.1 RECURRENT COLD SORES: ICD-10-CM

## 2023-05-10 DIAGNOSIS — N18.31 STAGE 3A CHRONIC KIDNEY DISEASE (H): ICD-10-CM

## 2023-05-10 DIAGNOSIS — E11.65 TYPE 2 DIABETES MELLITUS WITH HYPERGLYCEMIA, WITH LONG-TERM CURRENT USE OF INSULIN (H): ICD-10-CM

## 2023-05-10 DIAGNOSIS — M85.89 OSTEOPENIA OF MULTIPLE SITES: ICD-10-CM

## 2023-05-10 DIAGNOSIS — D50.9 IRON DEFICIENCY ANEMIA, UNSPECIFIED IRON DEFICIENCY ANEMIA TYPE: ICD-10-CM

## 2023-05-10 DIAGNOSIS — E78.5 HYPERLIPIDEMIA LDL GOAL <100: ICD-10-CM

## 2023-05-10 DIAGNOSIS — M16.11 PRIMARY OSTEOARTHRITIS OF RIGHT HIP: ICD-10-CM

## 2023-05-10 DIAGNOSIS — I50.32 CHRONIC HEART FAILURE WITH PRESERVED EJECTION FRACTION (H): ICD-10-CM

## 2023-05-10 DIAGNOSIS — Z79.4 TYPE 2 DIABETES MELLITUS WITH HYPERGLYCEMIA, WITH LONG-TERM CURRENT USE OF INSULIN (H): ICD-10-CM

## 2023-05-10 LAB
ALBUMIN SERPL BCG-MCNC: 4.1 G/DL (ref 3.5–5.2)
ALP SERPL-CCNC: 85 U/L (ref 35–104)
ALT SERPL W P-5'-P-CCNC: 13 U/L (ref 10–35)
ANION GAP SERPL CALCULATED.3IONS-SCNC: 12 MMOL/L (ref 7–15)
AST SERPL W P-5'-P-CCNC: 21 U/L (ref 10–35)
BILIRUB SERPL-MCNC: 0.4 MG/DL
BUN SERPL-MCNC: 18.8 MG/DL (ref 8–23)
CALCIUM SERPL-MCNC: 9.5 MG/DL (ref 8.8–10.2)
CHLORIDE SERPL-SCNC: 102 MMOL/L (ref 98–107)
CREAT SERPL-MCNC: 0.95 MG/DL (ref 0.51–0.95)
DEPRECATED HCO3 PLAS-SCNC: 27 MMOL/L (ref 22–29)
ERYTHROCYTE [DISTWIDTH] IN BLOOD BY AUTOMATED COUNT: 15.3 % (ref 10–15)
GFR SERPL CREATININE-BSD FRML MDRD: 59 ML/MIN/1.73M2
GLUCOSE SERPL-MCNC: 176 MG/DL (ref 70–99)
HCT VFR BLD AUTO: 40.3 % (ref 35–47)
HGB BLD-MCNC: 12.8 G/DL (ref 11.7–15.7)
MCH RBC QN AUTO: 29 PG (ref 26.5–33)
MCHC RBC AUTO-ENTMCNC: 31.8 G/DL (ref 31.5–36.5)
MCV RBC AUTO: 91 FL (ref 78–100)
PLATELET # BLD AUTO: 205 10E3/UL (ref 150–450)
POTASSIUM SERPL-SCNC: 4 MMOL/L (ref 3.4–5.3)
PROT SERPL-MCNC: 7.3 G/DL (ref 6.4–8.3)
RBC # BLD AUTO: 4.41 10E6/UL (ref 3.8–5.2)
SODIUM SERPL-SCNC: 141 MMOL/L (ref 136–145)
WBC # BLD AUTO: 7.8 10E3/UL (ref 4–11)

## 2023-05-10 PROCEDURE — 85027 COMPLETE CBC AUTOMATED: CPT | Performed by: FAMILY MEDICINE

## 2023-05-10 PROCEDURE — 99214 OFFICE O/P EST MOD 30 MIN: CPT | Performed by: FAMILY MEDICINE

## 2023-05-10 PROCEDURE — 36415 COLL VENOUS BLD VENIPUNCTURE: CPT | Performed by: FAMILY MEDICINE

## 2023-05-10 PROCEDURE — 80053 COMPREHEN METABOLIC PANEL: CPT | Performed by: FAMILY MEDICINE

## 2023-05-10 RX ORDER — ACYCLOVIR 50 MG/G
OINTMENT TOPICAL PRN
Qty: 15 G | Refills: 3 | Status: SHIPPED | OUTPATIENT
Start: 2023-05-10

## 2023-05-10 RX ORDER — ACYCLOVIR 400 MG/1
400 TABLET ORAL EVERY 8 HOURS
Qty: 15 TABLET | Refills: 2 | Status: SHIPPED | OUTPATIENT
Start: 2023-05-10 | End: 2024-04-30

## 2023-05-10 ASSESSMENT — ANXIETY QUESTIONNAIRES
GAD7 TOTAL SCORE: 4
GAD7 TOTAL SCORE: 4
6. BECOMING EASILY ANNOYED OR IRRITABLE: SEVERAL DAYS
1. FEELING NERVOUS, ANXIOUS, OR ON EDGE: SEVERAL DAYS
GAD7 TOTAL SCORE: 4
7. FEELING AFRAID AS IF SOMETHING AWFUL MIGHT HAPPEN: NOT AT ALL
7. FEELING AFRAID AS IF SOMETHING AWFUL MIGHT HAPPEN: NOT AT ALL
2. NOT BEING ABLE TO STOP OR CONTROL WORRYING: NOT AT ALL
3. WORRYING TOO MUCH ABOUT DIFFERENT THINGS: SEVERAL DAYS
4. TROUBLE RELAXING: SEVERAL DAYS
5. BEING SO RESTLESS THAT IT IS HARD TO SIT STILL: NOT AT ALL

## 2023-05-10 ASSESSMENT — PATIENT HEALTH QUESTIONNAIRE - PHQ9
10. IF YOU CHECKED OFF ANY PROBLEMS, HOW DIFFICULT HAVE THESE PROBLEMS MADE IT FOR YOU TO DO YOUR WORK, TAKE CARE OF THINGS AT HOME, OR GET ALONG WITH OTHER PEOPLE: NOT DIFFICULT AT ALL
SUM OF ALL RESPONSES TO PHQ QUESTIONS 1-9: 6
SUM OF ALL RESPONSES TO PHQ QUESTIONS 1-9: 6

## 2023-05-10 ASSESSMENT — PAIN SCALES - GENERAL: PAINLEVEL: EXTREME PAIN (8)

## 2023-05-10 NOTE — H&P (VIEW-ONLY)
[facility]  [address]  [facility] [address]  Phone: [phone]  Primary Provider: Jimi Tristan  Pre-op Performing Provider: JIMI TRISTAN        PREOPERATIVE EVALUATION:  Today's date: 5/10/2023    Betty Tee is a 83 year old female who presents for a preoperative evaluation.      5/10/2023    10:40 AM   Additional Questions   Roomed by Francesca SEN     Surgical Information:  Surgery/Procedure: ARTHROPLASTY,RIGHT  HIP, TOTAL  Surgery Location: [facility]  Surgeon: Thomas Shannon MD  Surgery Date: 05/31/2023   Time of Surgery: 7:30am  Where patient plans to recover: At a TCU (Transitional Care Unit)  Fax number for surgical facility: Note does not need to be faxed, will be available electronically in Epic.    Assessment & Plan     The proposed surgical procedure is considered INTERMEDIATE risk.      ICD-10-CM    1. Preop general physical exam  Z01.818 Comprehensive metabolic panel (BMP + Alb, Alk Phos, ALT, AST, Total. Bili, TP)     CBC with platelets     Comprehensive metabolic panel (BMP + Alb, Alk Phos, ALT, AST, Total. Bili, TP)     CBC with platelets      2. Primary osteoarthritis of right hip  M16.11       3. Chronic heart failure with preserved ejection fraction (H)  I50.32       4. Paroxysmal atrial fibrillation (H)  I48.0       5. Type 2 diabetes mellitus with hyperglycemia, with long-term current use of insulin (H)  E11.65 Comprehensive metabolic panel (BMP + Alb, Alk Phos, ALT, AST, Total. Bili, TP)    Z79.4 CBC with platelets     Comprehensive metabolic panel (BMP + Alb, Alk Phos, ALT, AST, Total. Bili, TP)     CBC with platelets      6. Stage 3b chronic kidney disease (H)  N18.32 Comprehensive metabolic panel (BMP + Alb, Alk Phos, ALT, AST, Total. Bili, TP)     CBC with platelets     Comprehensive metabolic panel (BMP + Alb, Alk Phos, ALT, AST, Total. Bili, TP)     CBC with platelets      7. Osteopenia of multiple sites  M85.89        8. GAVE (gastric antral vascular ectasia)  K31.819       9. Iron deficiency anemia, unspecified iron deficiency anemia type  D50.9       10. Sjogren's syndrome with lung involvement (H)  M35.02       11. Essential hypertension with goal blood pressure less than 140/90  I10       12. Stage 3a chronic kidney disease (H)  N18.31       13. Hyperlipidemia LDL goal <100  E78.5       14. Recurrent cold sores  B00.1 acyclovir (ZOVIRAX) 400 MG tablet     acyclovir (ZOVIRAX) 5 % external ointment             Risks and Recommendations:  The patient has the following additional risks and recommendations for perioperative complications:   - Consult Hospitalist / IM to assist with post-op medical management   - Recurrent use of steroids  Cardiovascular:   - Cardiology following- appt 5/24/23  Diabetes:  - Patient is on*prn* insulin therapy; diabetic NPO guidelines provided and discussed.  Pulmonary:    - Incentive spirometry post-op   - Consider Respiratory Therapy (Respiratory Care IP Consult) post-op  Obstructive Sleep Apnea:   BiPap use- continue in hospital/TCU, will bring unit  Anemia/Bleeding/Clotting:    - Anemia resolved and does not require treatment prior to surgery. Monitor hemoglobin postoperatively      Antiplatelet or Anticoagulation Medication Instructions:   - aspirin: Discontinue aspirin 7-10 days prior to procedure to reduce bleeding risk. It should be resumed postoperatively.  This has been cleared per cardiology.    4/13/23 Cardiology notes, Dr. Rosa  Hip surgery stratification; she is presently euvolemic on exam, her ejection fraction is normal-given her history of atrial fibrillation she is at moderate risk for postop atrial fibrillation and I think low risk for perioperative MI.  She can hold aspirin 1 week prior and resume after bleeding is controlled post OR.  Overall she is moderate risk for atrial fibrillation but overall low risk for hypotension and pulmonary edema.     Additional Medication  Instructions:  Patient is to take all scheduled medications on the day of surgery EXCEPT for modifications listed below:   - Beta Blockers: Continue taking on the day of surgery.   - Diuretics: HOLD the day of surgery (or per cardiology recs 5/24/23)   - Statins: Continue taking on the day of surgery.    - short acting insulin (e.g. regular, lispro, aspart): HOLD on the morning of surgery (pt has been taking infrequently, only if glucose levels >150).    - bisphosphonates (alendronate, ibandronate, risedronate): okay to take Mon prior to surgery as usual   - SSRIs, SNRIs, TCAs, Antipsychotics: Continue without modification.    - LABA, inhaled corticosteroid, long-acting anticholinergics: Continue without modification.   - Prednisone- Take usual dose on day of surgery   - Intraoperative stress dose steroids may be indicated due to chronic steroid use in the last 3 months (e.g. > 3 weeks of prednisone 20 mg or daily prednisone 5 mg)-- takes prednisone 5mg daily.   - Herbal medications and vitamins: HOLD 7 days prior to surgery.      RECOMMENDATION:  APPROVAL GIVEN to proceed with proposed procedure, without further diagnostic evaluation, unless concerns at upcoming cardiology clearance/recommendations on 5/24/23.                Subjective     HPI related to upcoming procedure:     82yo with extensive past medical history significant for severe right hip osteoarthritis, gastric antral vascular ectasia with bleeding (improved after stopping anticoagulation), recent Watchman device placement (allowing anticoagulation to be stopped a few months after placement), Sjogren's follicular bronchiolitis, chronic steroid therapy, HFpEF NYHA III, atrial fibrillation, hypertension, obstructive sleep apnea, CKD III, DM II, history of colo-uterine fistula s/p sigmoidectomy, gout and osteoporosis.    Her anemia/BRBPR and GAVE issues have resolved after stopping anticoagulation (after Watchman procedure in 9/22). Hgb levels have  remained normal/stable.  Now just on asa daily since 3/23.    Ongoing severe pain in right hip and leg area.  Conservative options have not been helpful.  She also complains of significant shooting pain down R hip and R leg areas, and discussed hip surgery is unlikely to help these symptoms.  She does feel that her mobility is significantly negatively affected by the hip specific pain, and hopes that improving her mobility will help other issues.    Walking with walker- longest walk - to [employer], at least a half a block with steps.  Also following with Dr. Alba - pain clinic.  She has given her the oxycodone and the muscle relaxer.  At last visit, noted that she thought the oxycodone was really helping her be more active for much of the day, but today she doesn't think it's helping as much.      DM II- took 2 units of novolog one of the last few days, but it's the first time she's needed/taken the novolog in a long time. Taking it only if glucose level is >150.  Glucose this am 131.  Takes ozempic 1mg weekly- Monday. This has been keeping glucose levels in range most of the time.  She has been off basaglar since 3/23 hospitalization.      HFpEF/diuretic changes--  4/13- torsemide dose lowered to 20mg am and 20mg pm.  Was having wt gain- 4/26/23, leg swelling and weight gain of 8 lbs. No SOB.    Discussed with cardiology 4/26/23, who rec increasing torsemide to 40mg in am, and 20mg in pm.  5/5/23- wt did not go down, and more a.fib sx's.  Later that day, they stated cardiology recs were to increase torsemide further to 40mg in am and 40mg in pm- never did that due to her losing 5 lbs before she had a chance to do the 40mg pm increase.  5/8/23- no swelling, wt 176 lbs, no a.fib over weekend.  Currently torsemide dose is still at 40mg in am and 20mg in pm.   Doing well, no SOB, no LE edema, wt in good range.        Card/CHF- recent visit with Dr. Rosa- 4/13/23. A/P as follows...  For now she is euvolemic  NYHA  class II.      Atrial fibrillation [id number] (age >75, HTN, CHF, gender) -paroxysmal last-EKG  showing sinus rhythm  S/p watchman 9/22   On asprin 81 mg daily      Primary prevention: on statin, on ASA  -I do not want to lower her dose of statin from here as she is on such a low-dose already     Frailty-I think she will have the potential to  Correct this after her hip surgery     Hip surgery stratification; she is presently euvolemic on exam, her ejection fraction is normal-given her history of atrial fibrillation she is at moderate risk for postop atrial fibrillation and I think low risk for perioperative MI.  She can hold aspirin 1 week prior and resume after bleeding is controlled post OR.  Overall she is moderate risk for atrial fibrillation but overall low risk for hypotension and pulmonary edema.       Return to clinic in September 2023 with echo at that time.      Radha Rosa MD          5/10/2023    10:41 AM   Preop Questions   1. Have you ever had a heart attack or stroke? No   2. Have you ever had surgery on your heart or blood vessels, such as a stent placement, a coronary artery bypass, or surgery on an artery in your head, neck, heart, or legs? YES - watchman L atrial appendage closure device 9/26/22   3. Do you have chest pain with activity? No   4. Do you have a history of  heart failure? YES - HFpEF, following with cardiology   5. Do you currently have a cold, bronchitis or symptoms of other infection? No   6. Do you have a cough, shortness of breath, or wheezing? No   7. Do you or anyone in your family have previous history of blood clots? YES - DVT in '14   8. Do you or does anyone in your family have a serious bleeding problem such as prolonged bleeding following surgeries or cuts? UNKNOWN - no recollection   9. Have you ever had problems with anemia or been told to take iron pills? YES - on iron   10. Have you had any abnormal blood loss such as black, tarry or bloody stools, or  abnormal vaginal bleeding? YES - GAVE, last EGD 11/22, hgb has been stable recently   11. Have you ever had a blood transfusion? No   12. Are you willing to have a blood transfusion if it is medically needed before, during, or after your surgery? Yes   13. Have you or any of your relatives ever had problems with anesthesia? No   14. Do you have sleep apnea, excessive snoring or daytime drowsiness? YES - ANGELICA   14a. Do you have a CPAP machine? Yes- BiPap   15. Do you have any artifical heart valves or other implanted medical devices like a pacemaker, defibrillator, or continuous glucose monitor? No   16. Do you have artificial joints? No   17. Are you allergic to latex? No     Health Care Directive: Has 2011 directive, will review  Patient does have a Health Care Directive or Living Will: Discussed advance care planning with patient; information given to patient to review.  Pt reviewed Directive, no changes desired.    Preoperative Review of :   reviewed - controlled substances reflected in medication list.      Status of Chronic Conditions:  See problem list for active medical problems.  Problems all longstanding and stable, except as noted/documented. See extensive notes in problem list section. See ROS for pertinent symptoms related to these conditions.      Review of Systems  CONSTITUTIONAL: NEGATIVE for fever, chills, change in weight  INTEGUMENTARY/SKIN: NEGATIVE for worrisome rashes, moles or lesions  EYES: NEGATIVE for vision changes or irritation   ENT/MOUTH: NEGATIVE for ear, mouth and throat problems  RESP: NEGATIVE for significant cough or SOB  CV: NEGATIVE for chest pain, palpitations or peripheral edema  GI: NEGATIVE for nausea, abdominal pain, heartburn, or change in bowel habits  : NEGATIVE for frequency, dysuria, or hematuria  MUSCULOSKELETAL: NEGATIVE for significant arthralgias or myalgia other than R hip pain and R sciatic symptoms  NEURO: NEGATIVE for dizziness or paresthesias - does have  weakness due to pain   ENDOCRINE: NEGATIVE for temperature intolerance, skin/hair changes  HEME: NEGATIVE for bleeding problems  PSYCHIATRIC: NEGATIVE for changes in mood or affect    Patient Active Problem List    Diagnosis Date Noted     Elevated parathyroid hormone 04/21/2022     Priority: High     4/22 PTH intact elevated- will add on Vit D, but not able to add on calcium to the lab- discuss at 5/22 appt with pt.       Follicular bronchiolitis (H)      Priority: High     Follicular bronchiolitis w/ Sjogrens. Following with pulmonary, Dr. Tillman.      Dx associated with Sjogrens, dx by chest CT showing mosaic attenuation and air trapping    -10/21, rec 6mo f/u with PFTs.  Rec daily azithromycin, and BREO (from flovent), singulair (and prednisone but also on this for Sjogren's, managed/rx'd by Dr. Lopez)      -4/22-  A/p- 'Follicular bronchiolitis with Sjogren's. Last CT scan showed improvement in her ILD but persistent changes of follicular bronchiolitis. She has done well with treatment of her follicular bronchiolitis. Repeat PFT is within normal limits. However, she has worsening shortness of breath associated with productive cough. We recommend to try prednisone 20 mg for 1 week then she can go back to regular dose of prednisone 5 mg daily. We will continue  azithromycin 250 mg daily, montelukast 10 mg daily, and Breo Ellipta inhaler 100-25. She is tolerating the medications well. I will see her back in 6 months with full PFT.'         Hyperlipidemia LDL goal <100 05/22/2017     Priority: High     Atorvastatin 5 mg/d        Type 2 diabetes mellitus with hyperglycemia, with long-term current use of insulin (H) 10/24/2016     Priority: High     Ozempic, rare prn novolog in '23.   MTM following     Basaglar discontinued during 3/23 hospitalization due to lower glucose levels, which remained low after hospitalization (5/23).    Dx in 11/16 with A1C of 7.0, increased to 11 suddenly.  7035-0175- A1Cs in 6-7s.        (HFpEF) heart failure with preserved ejection fraction (H) 08/27/2016     Priority: High     Following with Dr. Rosa.  Yearly visit with echo with Dr. Rosa.  6 month interval visits with [facility].             Lower GI bleed 06/26/2016     Priority: High     Hospitalized 6/26-28/16 with BRBPR.  Colonoscopy with Dr. Siddiqi intended for after hospital stay, but pt had multiple complications including readmission on 7/2/16 for respiratory failure due to fluid overload and uncontrolled a.fib.  12/16 consult with Dr. Siddiqi- wary of pt's ability to safely tolerate a colonoscopy or colon surgery.  Rec considering cologuard test to stratify her risk- will discuss with pt at upcoming visit/s.  Discussed- pt is not interested in any testing- would not act on the results if cancer.       Sjogren's syndrome with lung involvement (H) 10/30/2015     Priority: High     Dx in '15, following with rheumatology- Dr. Lopez.  On plaquenil, evoxac and prednisone 5mg/d    Sx's likely since '12 with migrating joint pains.   Worsening SOB since early '15, lung lesions, PFT's with 50% decreased function- referred to pulmonary.  Has been on more stable prednisone 5mg/d for few months prior to 10/15 f/u PFT's stable, which remained stable.  Will cont f/u through pulmonary and rheum.           History of deep vein thrombosis (DVT) of lower extremity 09/15/2014     Priority: High     Dx 9/11/14.  10/14 hematology consult- felt that the DVT was not a coumadin failure- attributed to immobility s/p fall, lower INR during hospitalization/rehab stay.    2/19- coumadin switched to xarelto via cardiology.       Breast fibroadenoma 08/18/2014     Priority: High     1/14- rec f/u mammogram in a year.       Paroxysmal atrial fibrillation (H) 11/04/2011     Priority: High     Following with Dr. Rosa.    Watchman procedure 9/22 due to intolerance to anticoagulation due to GI bleeding w/ GAVE--  S/p procedure- initial plan for xarelto x 45  days, then clodipogrel + asa x 6 months, then asa lifelong.      A.fib first Dx in 10/11 during inpt stay for diverticulitis which ultimately required partial colectomy.  Initially anticoagulated with warfarin.  Switched to xarelto in early '18.           Essential hypertension with goal blood pressure less than 140/90 10/19/2010     Priority: High     Torsemide and potassium- back on 3/23  Still off spironolactone, since hospital stay in 3/23.  toprol XL     On potassium since 2017).  Diltiazem stopped in 12/15 due to dizziness (discussed with Dr. Hurtado- switched to toprol XL).  Lisinopril 2.5mg/d stopped in 2/19 per Dr. Rosa, cardiology- due to low BPs..       Major depressive disorder, recurrent episode, moderate (H)      Priority: High     Lexapro 20mg/d.      10/17- switch from paxil 10mg/d to lexapro 10mg/d.  Pt had been on paxil 10/20mg/d for years  Trial off in 10/15 (concerned with wt gain).    Weaned off clonazepam.           Osteopenia of multiple sites 09/24/2004     Priority: High     Osteopenia with t-score of -2.1, started on fosamax through rheumatology in 6/20.    Osteopenia in '10, recheck '15.  Risk factors of PPI use and prednisone use since '12 (0-5mg/d).         Gastroenteritis 03/07/2023     Priority: Medium     Lactic acidosis 03/07/2023     Priority: Medium     Gout, unspecified cause, unspecified chronicity, unspecified site 01/05/2023     Priority: Medium     Followed by Dr. Lopez, rheumatology, and rheum MT.  Allopurinol started in 12/22 through rheum MT (1/23 visit w/ Dr. Lopez, noted pt deferred taking it, but rx for allopurinol sent that day).  PRN Kinaret injections for acute flares 100mg 2x/day x 3 days.       Vitamin D deficiency 11/30/2022     Priority: Medium     GAVE (gastric antral vascular ectasia) 11/30/2022     Priority: Medium     Following with GI with periodic EGDs when signs of GI bleeding- black stools, hgb drop.  11/22 EGD notes- There is mild residual  non-bleeding GAVE, treated with argon plasma coagulation (APC).--- This is usually seen secondary to medications such as bisphosphonates (alendronate) or oral iron supplements (ferrous sulfate), or NSAIDs.     -Pt working with M[facility] - looking at infusion options.       Iron deficiency anemia, unspecified iron deficiency anemia type 11/30/2022     Priority: Medium     GAVE related, BRBPR, hgb 8-9s in '22.  Improved/resolved after stopping anticoagulants (other than asa) after Watchman left atrial appendage device placed in 9/22.  Anticoagulation stepped down, xarelto stopped after 45 days, plavix + asa x 6 months, then daily asa lifelong.       Persistent atrial fibrillation (H) 09/26/2022     Priority: Medium     Seasonal allergic rhinitis due to pollen 03/23/2022     Priority: Medium     Claritin, flonase, singulair       Osteoarthritis of right hip 01/05/2021     Priority: Medium     Severe right hip pain - OA.  Following with Dr. Shannon.      Planning R hip replacement when anticoagulation issues stable.    Also following with pain clinic, Dr. Alba.  Oxycodone.           Urge incontinence of urine 10/02/2019     Priority: Medium     10/19 consultation with Dr. Nassar- improved sx's with adjusting diuretic dosing and more frequent bathroom breaks.  Torsemide dosing switched to 40mg in am and 30mg in pm (from other way around).  Topical estradiol trial in late '21.       Stage 3a chronic kidney disease (H) 09/03/2019     Priority: Medium     Inflammatory arthritis 11/09/2017     Priority: Medium     Inflam jt issue- shoulder, hp, generalized jt pains resolved with prednisone- dx with Sjogrens - initial dx pallendromic rheumatism vs pseudogout per rheum- stabilized on prednisone.       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  Followed by [facility] and rheumatology.    ILD conference- thought Follicular bronchiolitis  -started on flovent, azithromycin and montelukast in 4/16.  4/17- off home O2 (had been on for activity) since 4/17 hospitalization txt of pneumonia/influenza/acute respiratory failure.       ANGELICA (obstructive sleep apnea) 04/12/2016     Priority: Medium     Dr. Rolle following.  BiLevel (levels lowered in 2/21)    Sleep study in '16, mild sleep apnea, but significant sleep hypoventilation.  Started on CPAP, not helpful, so put on bilevel PAP through Dr. Chandler.       Enlarged lymph node 08/04/2015     Priority: Medium     Gastroesophageal reflux disease without esophagitis 07/23/2015     Priority: Medium     Pantoprazole 40mg/d     Pantoprazole restarted in 3/22 after mistakenly placed on pepcid instead for about the past year- hgb drop noted in ~2/22.  PPI started in 11/11 (when hospitalized for colonic abscess).       Rib pain 10/31/2014     Priority: Medium     S/p severe fall down stairs in 8/14- hospitalized for a few days, then in rehab for a couple weeks.  Significant swelling, no fx's.  INR dropped for a bit between hosp/rehab, and pt developed DVT in 9/14 in R leg.       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  A.fib started during with fluid overload during one of these hospitalizations.  Colostomy takedown in 2/12.  F/u colonoscopy in 12/12 (Dr. Canseco) looked good- rec next f/u colonoscopy in 10 yrs.           Health Care Home 02/09/2012     Priority: Low              Left ventricular hypertrophy 11/04/2011     Priority: Low     Advanced directives, counseling/discussion 11/01/2011     Priority: Low      Received outside advance directive.  HCD:Previously signed by patient and notarized by Lore.  scanned into EMR as Advance Directive/Living Will document. View document and details in Code Status History Report. Please see advance directive for specifics.   Health care directive (FIVE WISHES) reviewed and documented.  5/10/12 MALIKA Aguilar LPN        Received outside DNR form.    scanned and placed behind media tab.  Please see DNR form for specifics. Routed to certified facilitator for review and further documentation.  DNR form reviewed and documented.  11/1/11 MALIKA Aguilar LPN  In media tab under date 10/14/11.  Lev Tristan MD           Alcohol abuse, in remission 09/24/2004     Priority: Low     Temporomandibular joint disorder 11/24/2003     Priority: Low     Problem list name updated by automated process. Provider to review        Past Medical History:   Diagnosis Date     Alcohol abuse, in remission      Allergic rhinitis, cause unspecified     allegra helps when she takes it     Antiplatelet or antithrombotic long-term use      Atrial fibrillation (H)     in hosp in 11/11 after surgery w/ fluid overload     Cardiomegaly     LVH on stress echo- cardiac w/u at [facility] ER- neg CT scan for PE, neg stress echo in 8/06     Chest pain, unspecified      Congestive heart failure (H)      Depressive disorder      Diabetes (H)      Disorder of bone and cartilage, unspecified     osteopenia (had been on prempro), improved on 6/06 dexa, stable dexa 11/10     Diverticulosis of colon (without mention of hemorrhage)     last episode yrs ago     Essential hypertension, benign      Follicular bronchiolitis (H)     associated with Sjogrens, dx by chest CT showing mosaic attenuation and air trapping     Gastro-oesophageal reflux disease      ILD (interstitial lung disease) (H)     associated with Sjogrens, also has mildly elevated IgG4, first noted on chest CT 2015 (mild changes) and also has  small airways disease; ILD improved on follow up chest CT 2018.     Insomnia, unspecified     weaned off clonazepam     Irregular heart beat      Lumbago 07/2009    MRI with DJD, now seeing Dr. Cain for sciatic sx's     Major depressive disorder, recurrent episode, moderate (H)      Obstructive sleep apnea     uses cpap     Osteoarthrosis, unspecified whether generalized or localized, unspecified site      Sjogren's syndrome (H)     + RG and SSA and lip bx     Sleep apnea     uses a cpap machine     Tobacco use disorder     chantix in 9/07, started again in 6/08, working     Past Surgical History:   Procedure Laterality Date     APPENDECTOMY       BACK SURGERY  1962     BACK SURGERY  1962     BIOPSY BREAST  09/27/2002    Biopsy Left Breast     BIOPSY BREAST Left 09/27/2002     BREAST SURGERY       CARDIAC SURGERY       CARDIAC SURGERY       CHOLECYSTECTOMY  1990's?     CHOLECYSTECTOMY       COLECTOMY LEFT  11/07/2011    Procedure:COLECTOMY LEFT; Laparoscopic mobilization of splenic flexture, sigmoid colectomy, coloprotoscopy, loop illeostomy; Surgeon:CK CASTANEDA; Location:[facility]     COLECTOMY LEFT  11/07/2011     COLONOSCOPY  1990,s     COLONOSCOPY N/A 5/3/2022    Procedure: COLONOSCOPY;  Surgeon: Guru Winsome Dias MD;  Location:  GI     CV LEFT ATRIAL APPENDAGE CLOSURE N/A 9/26/2022    Procedure: Left Atrial Appendage Closure;  Surgeon: Uzair Crews MD;  Location:  [facility]     ENT SURGERY       EYE SURGERY  2012     FLEXIBLE SIGMOIDOSCOPY  11/03/2011     HYSTERECTOMY TOTAL ABDOMINAL, BILATERAL SALPINGO-OOPHORECTOMY, COMBINED  11/07/2011    Procedure:COMBINED HYSTERECTOMY TOTAL ABDOMINAL, BILATERAL SALPINGO-OOPHORECTOMY; total abdominal hysterectomy, bilateral salpingo-oophorectomy; Surgeon:ALETA MANUEL; Location:[facility]     HYSTERECTOMY TOTAL ABDOMINAL, BILATERAL SALPINGO-OOPHORECTOMY, COMBINED  11/07/2011     ILEOSTOMY  02/01/2012    takedown loop ileostomy       INSERT STENT URETER  11/07/2011    Procedure:INSERT STENT URETER; Placement of Bilateral Ureteral Stents ; Surgeon:PRANEETH BRYANT; Location:[facility]     SIGMOIDECTOMY  02/01/2012    Dr. Siddiqi, Sigmoidectomy for diverticular abscess, iliostomy afterwards until repair     SIGMOIDOSCOPY FLEXIBLE  11/03/2011    Procedure:SIGMOIDOSCOPY FLEXIBLE; Flexible Sigmoidoscopy; Surgeon:CK SIDDIQI; Location:[facility]     TAKEDOWN ILEOSTOMY  02/01/2012    Procedure:TAKEDOWN ILEOSTOMY; Takedown Loop Ileostomy ; Surgeon:CK SIDDIQI; Location:[facility]     URETERAL STENT PLACEMENT  11/07/2011     Z APPENDECTOMY  1970's?     Z NONSPECIFIC PROCEDURE  11/2005    exploratory abd lap, adhesions, resolved RLQ pain, diverticulitis episodes     Current Outpatient Medications   Medication Sig Dispense Refill     ACCU-CHEK SHANNON PLUS test strip USE TO TEST BLOOD SUGARS 3 TIMES DAILY 300 strip 5     acetaminophen (TYLENOL) 500 MG tablet Take 1,000 mg by mouth every 8 hours as needed (max 6 tablets/24 hours, 2 tablets/dose)       acyclovir (ZOVIRAX) 400 MG tablet Take 1 tablet (400 mg) by mouth every 8 hours For a couple days 15 tablet 2     acyclovir (ZOVIRAX) 5 % external ointment Apply topically as needed (6 times day PRN for outbreaks) As needed for outbreaks 15 g 3     albuterol (PROAIR HFA/PROVENTIL HFA/VENTOLIN HFA) 108 (90 Base) MCG/ACT inhaler Inhale 2 puffs into the lungs every 6 hours 18 g 11     alendronate (FOSAMAX) 70 MG tablet TAKE 1 TABLET BY MOUTH EVERY 7 DAYS 4 tablet 1     allopurinol (ZYLOPRIM) 100 MG tablet Take 1 tablet (100 mg) by mouth daily 90 tablet 1     anakinra (KINERET) 100 MG/0.67ML SOSY injection 100 mg every day x 3 days as needed for flare ups 6.7 mL 3     aspirin (ASA) 81 MG EC tablet Take 1 tablet (81 mg) by mouth daily 90 tablet 1     azithromycin (ZITHROMAX) 250 MG tablet TAKE 1 TABLET BY MOUTH EVERY DAY 30 tablet 5     BD PEN NEEDLE JANE 2ND GEN 32G X 4 MM miscellaneous USE TO TEST 4 TIMES A DAY  400 each 1     blood glucose (NO BRAND SPECIFIED) lancets standard Use to test blood sugar 3 times daily or as directed 100 lancet 3     cevimeline (EVOXAC) 30 MG capsule Take 1 capsule (30 mg) by mouth 3 times daily (Patient taking differently: Take 30 mg by mouth 3 times daily as needed) 90 capsule 11     Cholecalciferol (VITAMIN D3) 50 MCG (2000 UT) CAPS TAKE 100 MCG BY MOUTH DAILY (TAKE 2 TABLET (50 MCG) BY MOUTH DAILY - ORAL) 180 capsule 0     COMPOUNDED NON-CONTROLLED SUBSTANCE (CMPD RX) - PHARMACY TO MIX COMPOUNDED MEDICATION Estriol 1 mg/g Apply small amount to finger and apply to inside vagina daily for 2 weeks then twice weekly Route: vaginally Dispense 30 grams 11 refills 30 g 11     cyanocobalamin (VITAMIN B-12) 1000 MCG tablet Take 1 tablet (1,000 mcg) by mouth daily (Patient taking differently: Take 1,000 mcg by mouth three times a week) 30 tablet 1     escitalopram (LEXAPRO) 20 MG tablet TAKE 1 TABLET BY MOUTH EVERY DAY 90 tablet 1     ferrous gluconate (FERGON) 324 (38 Fe) MG tablet Take 1 tablet (324 mg) by mouth daily (with breakfast) 90 tablet 1     fluticasone (FLONASE) 50 MCG/ACT nasal spray SPRAY 1-2 SPRAYS INTO BOTH NOSTRILS DAILY AS NEEDED FOR ALLERGIES 16 mL 11     fluticasone-vilanterol (BREO ELLIPTA) 100-25 MCG/INH inhaler Inhale 1 puff into the lungs daily 1 each 11     hydroxychloroquine (PLAQUENIL) 200 MG tablet Take 2 tablets (400 mg) by mouth daily Get annual eye exams for hydroxychloroquine (Plaquenil) monitoring and fax to [phone] 180 tablet 3     insulin lispro (HUMALOG KWIKPEN) 100 UNIT/ML (1 unit dial) KWIKPEN Inject 8 Units Subcutaneous 3 times daily (before meals)       ketoconazole (NIZORAL) 2 % external cream Apply topically 2 times daily as needed for itching 60 g 1     KLOR-CON 20 MEQ CR tablet TAKE 2 TABLETS (40 MEQ) BY MOUTH EVERY MORNING AND 1 TABLET (20 MEQ) EVERY EVENING. 270 tablet 3     lidocaine (LIDODERM) 5 % patch APPLY PATCH TO PAINFUL AREA FOR UP TO 12 H  WITHIN A 24 H PERIOD. REMOVE AFTER 12 HOURS. (Patient taking differently: Apply patch to painful area for up to 12 h within a 24 h period.  Remove after 12 hours.) 30 patch 2     loratadine (CLARITIN) 10 MG tablet TAKE 1 TABLET BY MOUTH EVERY DAY 90 tablet 3     methocarbamol (ROBAXIN) 750 MG tablet Take 1 tablet (750 mg) by mouth 3 times daily as needed for muscle spasms 90 tablet 3     metoprolol succinate ER (TOPROL XL) 50 MG 24 hr tablet Take 1 tablet (50 mg) by mouth 2 times daily 180 tablet 3     montelukast (SINGULAIR) 10 MG tablet TAKE 1 TABLET BY MOUTH EVERYDAY AT BEDTIME 90 tablet 0     oxyCODONE-acetaminophen (PERCOCET) 7.5-325 MG per tablet Take 1 tablet by mouth 2 times daily as needed for severe pain With at least 4 hours between doses. Ok to fill and start 23 60 tablet 0     OZEMPIC, 1 MG/DOSE, 4 MG/3ML pen INJECT 1 MG SUBCUTANEOUS ONCE A WEEK 9 mL 1     pantoprazole (PROTONIX) 40 MG EC tablet Take 1 tablet (40 mg) by mouth daily (replaces famotidine- should stop taking famotidine) 90 tablet 3     predniSONE (DELTASONE) 5 MG tablet Take 1 tablet (5 mg) by mouth daily 90 tablet 1     torsemide (DEMADEX) 20 MG tablet Take 2 tablets (40 mg) by mouth every morning AND 1 tablet (20 mg) every evening. 270 tablet 3     traZODone (DESYREL) 50 MG tablet TAKE 3 TABLETS (150 MG) BY MOUTH NIGHTLY AS NEEDED FOR SLEEP 270 tablet 0     atorvastatin (LIPITOR) 10 MG tablet TAKE 1/2 TABLET BY MOUTH EVERY DAY 45 tablet 3       Allergies   Allergen Reactions     Amoxicillin-Pot Clavulanate Nausea and Vomiting     Amoxicillin-Pot Clavulanate      Codeine Nausea and Vomiting     PN: LW Reaction: HIVES     Penicillins Nausea and Vomiting     PN: LW Reaction: GI Upset     Phenobarbital Itching     Seasonal Allergies         Social History     Tobacco Use     Smoking status: Former     Packs/day: 0.50     Types: Cigarettes     Start date:      Quit date: 2011     Years since quittin.7     Smokeless tobacco:  "Never     Tobacco comments:     1/2 ppd   Vaping Use     Vaping status: Never Used   Substance Use Topics     Alcohol use: No     Alcohol/week: 0.0 standard drinks of alcohol     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       History   Drug Use No         Objective     /62 (BP Location: Right arm, Patient Position: Sitting, Cuff Size: Adult Regular)   Pulse 52   Temp 97.7  F (36.5  C) (Temporal)   Resp 16   Ht 1.664 m (5' 5.5\")   Wt 78.9 kg (174 lb)   SpO2 97%   BMI 28.51 kg/m      Physical Exam    GENERAL APPEARANCE: healthy, alert and no distress     EYES: EOMI, PERRL     HENT: ear canals and TM's normal and nose and mouth without ulcers or lesions     NECK: no adenopathy, no asymmetry, masses, or scars and thyroid normal to palpation     RESP: lungs clear to auscultation - no rales, rhonchi or wheezes     CV: regular rates and rhythm, normal S1 S2, no S3 or S4 and no murmur, click or rub     ABDOMEN:  soft, nontender, no HSM or masses and bowel sounds normal     MS: extremities normal- no gross deformities noted, no evidence of inflammation in joints, FROM in all extremities.     SKIN: no suspicious lesions or rashes     NEURO: Normal strength and tone, sensory exam grossly normal, mentation intact and speech normal     PSYCH: mentation appears normal. and affect normal/bright     LYMPHATICS: No cervical adenopathy    Recent Labs   Lab Test 05/04/23  1328 04/13/23  1229 03/31/23  1404 03/21/23  1557 03/14/23  1657 03/09/23  0626 11/28/22  1024 11/10/22  1635 10/05/22  1231 09/23/22  1221   HGB  --   --  12.5 13.0 11.1* 10.7*   < > 8.1*   < > 12.0   PLT  --   --   --  286 214 141*   < > 224   < > 212   INR  --   --   --   --   --   --   --  1.43*  --  1.37*    141 139 137 139 141   < > 140   < > 141   POTASSIUM 3.7 3.6 3.6 4.3 3.8 3.6   < > 4.3   < > 3.5   CR 0.92 0.99* 1.03* 1.01* 0.76 0.81   < > 1.19*   < > 1.17*   A1C  --   --   --   --  7.3* 7.3*   < >  --   --   --     < > = values in this " interval not displayed.        Diagnostics:    Recent Results (from the past 168 hour(s))   Comprehensive metabolic panel (BMP + Alb, Alk Phos, ALT, AST, Total. Bili, TP)    Collection Time: 05/10/23 12:17 PM   Result Value Ref Range    Sodium 141 136 - 145 mmol/L    Potassium 4.0 3.4 - 5.3 mmol/L    Chloride 102 98 - 107 mmol/L    Carbon Dioxide (CO2) 27 22 - 29 mmol/L    Anion Gap 12 7 - 15 mmol/L    Urea Nitrogen 18.8 8.0 - 23.0 mg/dL    Creatinine 0.95 0.51 - 0.95 mg/dL    Calcium 9.5 8.8 - 10.2 mg/dL    Glucose 176 (H) 70 - 99 mg/dL    Alkaline Phosphatase 85 35 - 104 U/L    AST 21 10 - 35 U/L    ALT 13 10 - 35 U/L    Protein Total 7.3 6.4 - 8.3 g/dL    Albumin 4.1 3.5 - 5.2 g/dL    Bilirubin Total 0.4 <=1.2 mg/dL    GFR Estimate 59 (L) >60 mL/min/1.73m2   CBC with platelets    Collection Time: 05/10/23 12:17 PM   Result Value Ref Range    WBC Count 7.8 4.0 - 11.0 10e3/uL    RBC Count 4.41 3.80 - 5.20 10e6/uL    Hemoglobin 12.8 11.7 - 15.7 g/dL    Hematocrit 40.3 35.0 - 47.0 %    MCV 91 78 - 100 fL    MCH 29.0 26.5 - 33.0 pg    MCHC 31.8 31.5 - 36.5 g/dL    RDW 15.3 (H) 10.0 - 15.0 %    Platelet Count 205 150 - 450 10e3/uL   General PFT Lab (Please always keep checked)    Collection Time: 05/11/23 11:19 AM   Result Value Ref Range    FVC-Pred 2.39 L    FVC-Pre 1.96 L    FVC-%Pred-Pre 82 %    FEV1-Pre 1.54 L    FEV1-%Pred-Pre 84 %    FEV1FVC-Pred 77 %    FEV1FVC-Pre 79 %    FEFMax-Pred 4.73 L/sec    FEFMax-Pre 5.68 L/sec    FEFMax-%Pred-Pre 119 %    FEF2575-Pred 1.44 L/sec    FEF2575-Pre 1.33 L/sec    GMH3563-%Pred-Pre 91 %    ExpTime-Pre 7.16 sec    FIFMax-Pre 2.90 L/sec    VC-Pred 2.98 L    VC-Pre 1.95 L    VC-%Pred-Pre 65 %    IC-Pred 2.52 L    IC-Pre 1.61 L    IC-%Pred-Pre 63 %    ERV-Pred 0.45 L    ERV-Pre 0.34 L    ERV-%Pred-Pre 75 %    FEV1FEV6-Pred 77 %    FEV1FEV6-Pre 79 %    DLCOunc-Pred 18.82 ml/min/mmHg    DLCOunc-Pre 17.79 ml/min/mmHg    DLCOunc-%Pred-Pre 94 %    DLCOcor-Pre 18.13 ml/min/mmHg     DLCOcor-%Pred-Pre 96 %    VA-Pre 3.64 L    VA-%Pred-Pre 78 %    FEV1SVC-Pred 61 %    FEV1SVC-Pre 79 %         No EKG this visit, will defer to cardiology visit on 5/24/23.      Revised Cardiac Risk Index (RCRI):  The patient has the following serious cardiovascular risks for perioperative complications:   - Congestive Heart Failure (pulmonary edema, PND, s3 maulik, CXR with pulmonary congestion, basilar rales) = 1 point   - Diabetes Mellitus (on Insulin) = 1 point     RCRI Interpretation: 2 points: Class III (moderate risk - 6.6% complication rate)     Estimated Functional Capacity: CANNOT perform 4 METS without symptoms           Signed Electronically by: Ilene Tristan MD  Copy of this evaluation report is provided to requesting physician.      Answers for HPI/ROS submitted by the patient on 5/10/2023  If you checked off any problems, how difficult have these problems made it for you to do your work, take care of things at home, or get along with other people?: Not difficult at all  PHQ9 TOTAL SCORE: 6

## 2023-05-10 NOTE — PROGRESS NOTES
[facility]  [address] [facility]  [facility] [address]  Phone: [phone]  Primary Provider: Jimi Tristan  Pre-op Performing Provider: JIMI TRISTAN        PREOPERATIVE EVALUATION:  Today's date: 5/10/2023    Betty Tee is a 83 year old female who presents for a preoperative evaluation.      5/10/2023    10:40 AM   Additional Questions   Roomed by Francesca SEN     Surgical Information:  Surgery/Procedure: ARTHROPLASTY,RIGHT  HIP, TOTAL  Surgery Location: [facility]  Surgeon: Thomas Shannon MD  Surgery Date: 05/31/2023   Time of Surgery: 7:30am  Where patient plans to recover: At a TCU (Transitional Care Unit)  Fax number for surgical facility: Note does not need to be faxed, will be available electronically in Epic.    Assessment & Plan     The proposed surgical procedure is considered INTERMEDIATE risk.      ICD-10-CM    1. Preop general physical exam  Z01.818 Comprehensive metabolic panel (BMP + Alb, Alk Phos, ALT, AST, Total. Bili, TP)     CBC with platelets     Comprehensive metabolic panel (BMP + Alb, Alk Phos, ALT, AST, Total. Bili, TP)     CBC with platelets      2. Primary osteoarthritis of right hip  M16.11       3. Chronic heart failure with preserved ejection fraction (H)  I50.32       4. Paroxysmal atrial fibrillation (H)  I48.0       5. Type 2 diabetes mellitus with hyperglycemia, with long-term current use of insulin (H)  E11.65 Comprehensive metabolic panel (BMP + Alb, Alk Phos, ALT, AST, Total. Bili, TP)    Z79.4 CBC with platelets     Comprehensive metabolic panel (BMP + Alb, Alk Phos, ALT, AST, Total. Bili, TP)     CBC with platelets      6. Stage 3b chronic kidney disease (H)  N18.32 Comprehensive metabolic panel (BMP + Alb, Alk Phos, ALT, AST, Total. Bili, TP)     CBC with platelets     Comprehensive metabolic panel (BMP + Alb, Alk Phos, ALT, AST, Total. Bili, TP)     CBC with platelets      7. Osteopenia of multiple sites  M85.89        8. GAVE (gastric antral vascular ectasia)  K31.819       9. Iron deficiency anemia, unspecified iron deficiency anemia type  D50.9       10. Sjogren's syndrome with lung involvement (H)  M35.02       11. Essential hypertension with goal blood pressure less than 140/90  I10       12. Stage 3a chronic kidney disease (H)  N18.31       13. Hyperlipidemia LDL goal <100  E78.5       14. Recurrent cold sores  B00.1 acyclovir (ZOVIRAX) 400 MG tablet     acyclovir (ZOVIRAX) 5 % external ointment             Risks and Recommendations:  The patient has the following additional risks and recommendations for perioperative complications:   - Consult Hospitalist / IM to assist with post-op medical management   - Recurrent use of steroids  Cardiovascular:   - Cardiology following- appt 5/24/23  Diabetes:  - Patient is on*prn* insulin therapy; diabetic NPO guidelines provided and discussed.  Pulmonary:    - Incentive spirometry post-op   - Consider Respiratory Therapy (Respiratory Care IP Consult) post-op  Obstructive Sleep Apnea:   BiPap use- continue in hospital/TCU, will bring unit  Anemia/Bleeding/Clotting:    - Anemia resolved and does not require treatment prior to surgery. Monitor hemoglobin postoperatively      Antiplatelet or Anticoagulation Medication Instructions:   - aspirin: Discontinue aspirin 7-10 days prior to procedure to reduce bleeding risk. It should be resumed postoperatively.  This has been cleared per cardiology.    4/13/23 Cardiology notes, Dr. Rosa  Hip surgery stratification; she is presently euvolemic on exam, her ejection fraction is normal-given her history of atrial fibrillation she is at moderate risk for postop atrial fibrillation and I think low risk for perioperative MI.  She can hold aspirin 1 week prior and resume after bleeding is controlled post OR.  Overall she is moderate risk for atrial fibrillation but overall low risk for hypotension and pulmonary edema.     Additional Medication  Instructions:  Patient is to take all scheduled medications on the day of surgery EXCEPT for modifications listed below:   - Beta Blockers: Continue taking on the day of surgery.   - Diuretics: HOLD the day of surgery (or per cardiology recs 5/24/23)   - Statins: Continue taking on the day of surgery.    - short acting insulin (e.g. regular, lispro, aspart): HOLD on the morning of surgery (pt has been taking infrequently, only if glucose levels >150).    - bisphosphonates (alendronate, ibandronate, risedronate): okay to take Mon prior to surgery as usual   - SSRIs, SNRIs, TCAs, Antipsychotics: Continue without modification.    - LABA, inhaled corticosteroid, long-acting anticholinergics: Continue without modification.   - Prednisone- Take usual dose on day of surgery   - Intraoperative stress dose steroids may be indicated due to chronic steroid use in the last 3 months (e.g. > 3 weeks of prednisone 20 mg or daily prednisone 5 mg)-- takes prednisone 5mg daily.   - Herbal medications and vitamins: HOLD 7 days prior to surgery.      RECOMMENDATION:  APPROVAL GIVEN to proceed with proposed procedure, without further diagnostic evaluation, unless concerns at upcoming cardiology clearance/recommendations on 5/24/23.                Subjective     HPI related to upcoming procedure:     84yo with extensive past medical history significant for severe right hip osteoarthritis, gastric antral vascular ectasia with bleeding (improved after stopping anticoagulation), recent Watchman device placement (allowing anticoagulation to be stopped a few months after placement), Sjogren's follicular bronchiolitis, chronic steroid therapy, HFpEF NYHA III, atrial fibrillation, hypertension, obstructive sleep apnea, CKD III, DM II, history of colo-uterine fistula s/p sigmoidectomy, gout and osteoporosis.    Her anemia/BRBPR and GAVE issues have resolved after stopping anticoagulation (after Watchman procedure in 9/22). Hgb levels have  remained normal/stable.  Now just on asa daily since 3/23.    Ongoing severe pain in right hip and leg area.  Conservative options have not been helpful.  She also complains of significant shooting pain down R hip and R leg areas, and discussed hip surgery is unlikely to help these symptoms.  She does feel that her mobility is significantly negatively affected by the hip specific pain, and hopes that improving her mobility will help other issues.    Walking with walker- longest walk - to [place], at least a half a block with steps.  Also following with Dr. Alba - pain clinic.  She has given her the oxycodone and the muscle relaxer.  At last visit, noted that she thought the oxycodone was really helping her be more active for much of the day, but today she doesn't think it's helping as much.      DM II- took 2 units of novolog one of the last few days, but it's the first time she's needed/taken the novolog in a long time. Taking it only if glucose level is >150.  Glucose this am 131.  Takes ozempic 1mg weekly- Monday. This has been keeping glucose levels in range most of the time.  She has been off basaglar since 3/23 hospitalization.      HFpEF/diuretic changes--  4/13- torsemide dose lowered to 20mg am and 20mg pm.  Was having wt gain- 4/26/23, leg swelling and weight gain of 8 lbs. No SOB.    Discussed with cardiology 4/26/23, who rec increasing torsemide to 40mg in am, and 20mg in pm.  5/5/23- wt did not go down, and more a.fib sx's.  Later that day, they stated cardiology recs were to increase torsemide further to 40mg in am and 40mg in pm- never did that due to her losing 5 lbs before she had a chance to do the 40mg pm increase.  5/8/23- no swelling, wt 176 lbs, no a.fib over weekend.  Currently torsemide dose is still at 40mg in am and 20mg in pm.   Doing well, no SOB, no LE edema, wt in good range.        Card/CHF- recent visit with Dr. Rosa- 4/13/23. A/P as follows...  For now she is euvolemic  NYHA  class II.      Atrial fibrillation [id number] (age >75, HTN, CHF, gender) -paroxysmal last-EKG  showing sinus rhythm  S/p watchman 9/22   On asprin 81 mg daily      Primary prevention: on statin, on ASA  -I do not want to lower her dose of statin from here as she is on such a low-dose already     Frailty-I think she will have the potential to  Correct this after her hip surgery     Hip surgery stratification; she is presently euvolemic on exam, her ejection fraction is normal-given her history of atrial fibrillation she is at moderate risk for postop atrial fibrillation and I think low risk for perioperative MI.  She can hold aspirin 1 week prior and resume after bleeding is controlled post OR.  Overall she is moderate risk for atrial fibrillation but overall low risk for hypotension and pulmonary edema.       Return to clinic in September 2023 with echo at that time.      Radha Rosa MD          5/10/2023    10:41 AM   Preop Questions   1. Have you ever had a heart attack or stroke? No   2. Have you ever had surgery on your heart or blood vessels, such as a stent placement, a coronary artery bypass, or surgery on an artery in your head, neck, heart, or legs? YES - watchman L atrial appendage closure device 9/26/22   3. Do you have chest pain with activity? No   4. Do you have a history of  heart failure? YES - HFpEF, following with cardiology   5. Do you currently have a cold, bronchitis or symptoms of other infection? No   6. Do you have a cough, shortness of breath, or wheezing? No   7. Do you or anyone in your family have previous history of blood clots? YES - DVT in '14   8. Do you or does anyone in your family have a serious bleeding problem such as prolonged bleeding following surgeries or cuts? UNKNOWN - no recollection   9. Have you ever had problems with anemia or been told to take iron pills? YES - on iron   10. Have you had any abnormal blood loss such as black, tarry or bloody stools, or  abnormal vaginal bleeding? YES - GAVE, last EGD 11/22, hgb has been stable recently   11. Have you ever had a blood transfusion? No   12. Are you willing to have a blood transfusion if it is medically needed before, during, or after your surgery? Yes   13. Have you or any of your relatives ever had problems with anesthesia? No   14. Do you have sleep apnea, excessive snoring or daytime drowsiness? YES - ANGELICA   14a. Do you have a CPAP machine? Yes- BiPap   15. Do you have any artifical heart valves or other implanted medical devices like a pacemaker, defibrillator, or continuous glucose monitor? No   16. Do you have artificial joints? No   17. Are you allergic to latex? No     Health Care Directive: Has 2011 directive, will review  Patient does have a Health Care Directive or Living Will: Discussed advance care planning with patient; information given to patient to review.  Pt reviewed Directive, no changes desired.    Preoperative Review of :   reviewed - controlled substances reflected in medication list.      Status of Chronic Conditions:  See problem list for active medical problems.  Problems all longstanding and stable, except as noted/documented. See extensive notes in problem list section. See ROS for pertinent symptoms related to these conditions.      Review of Systems  CONSTITUTIONAL: NEGATIVE for fever, chills, change in weight  INTEGUMENTARY/SKIN: NEGATIVE for worrisome rashes, moles or lesions  EYES: NEGATIVE for vision changes or irritation   ENT/MOUTH: NEGATIVE for ear, mouth and throat problems  RESP: NEGATIVE for significant cough or SOB  CV: NEGATIVE for chest pain, palpitations or peripheral edema  GI: NEGATIVE for nausea, abdominal pain, heartburn, or change in bowel habits  : NEGATIVE for frequency, dysuria, or hematuria  MUSCULOSKELETAL: NEGATIVE for significant arthralgias or myalgia other than R hip pain and R sciatic symptoms  NEURO: NEGATIVE for dizziness or paresthesias - does have  weakness due to pain   ENDOCRINE: NEGATIVE for temperature intolerance, skin/hair changes  HEME: NEGATIVE for bleeding problems  PSYCHIATRIC: NEGATIVE for changes in mood or affect    Patient Active Problem List    Diagnosis Date Noted     Elevated parathyroid hormone 04/21/2022     Priority: High     4/22 PTH intact elevated- will add on Vit D, but not able to add on calcium to the lab- discuss at 5/22 appt with pt.       Follicular bronchiolitis (H)      Priority: High     Follicular bronchiolitis w/ Sjogrens. Following with pulmonary, Dr. Tillman.      Dx associated with Sjogrens, dx by chest CT showing mosaic attenuation and air trapping    -10/21, rec 6mo f/u with PFTs.  Rec daily azithromycin, and BREO (from flovent), singulair (and prednisone but also on this for Sjogren's, managed/rx'd by Dr. Lopez)      -4/22-  A/p- 'Follicular bronchiolitis with Sjogren's. Last CT scan showed improvement in her ILD but persistent changes of follicular bronchiolitis. She has done well with treatment of her follicular bronchiolitis. Repeat PFT is within normal limits. However, she has worsening shortness of breath associated with productive cough. We recommend to try prednisone 20 mg for 1 week then she can go back to regular dose of prednisone 5 mg daily. We will continue  azithromycin 250 mg daily, montelukast 10 mg daily, and Breo Ellipta inhaler 100-25. She is tolerating the medications well. I will see her back in 6 months with full PFT.'         Hyperlipidemia LDL goal <100 05/22/2017     Priority: High     Atorvastatin 5 mg/d        Type 2 diabetes mellitus with hyperglycemia, with long-term current use of insulin (H) 10/24/2016     Priority: High     Ozempic, rare prn novolog in '23.   MTM following     Basaglar discontinued during 3/23 hospitalization due to lower glucose levels, which remained low after hospitalization (5/23).    Dx in 11/16 with A1C of 7.0, increased to 11 suddenly.  [id number]- A1Cs in 6-7s.        (HFpEF) heart failure with preserved ejection fraction (H) 08/27/2016     Priority: High     Following with Dr. Rosa.  Yearly visit with echo with Dr. Rosa.  6 month interval visits with [facility].             Lower GI bleed 06/26/2016     Priority: High     Hospitalized 6/26-28/16 with BRBPR.  Colonoscopy with Dr. Siddiqi intended for after hospital stay, but pt had multiple complications including readmission on 7/2/16 for respiratory failure due to fluid overload and uncontrolled a.fib.  12/16 consult with Dr. Siddiqi- wary of pt's ability to safely tolerate a colonoscopy or colon surgery.  Rec considering cologuard test to stratify her risk- will discuss with pt at upcoming visit/s.  Discussed- pt is not interested in any testing- would not act on the results if cancer.       Sjogren's syndrome with lung involvement (H) 10/30/2015     Priority: High     Dx in '15, following with rheumatology- Dr. Lopez.  On plaquenil, evoxac and prednisone 5mg/d    Sx's likely since '12 with migrating joint pains.   Worsening SOB since early '15, lung lesions, PFT's with 50% decreased function- referred to pulmonary.  Has been on more stable prednisone 5mg/d for few months prior to 10/15 f/u PFT's stable, which remained stable.  Will cont f/u through pulmonary and rheum.           History of deep vein thrombosis (DVT) of lower extremity 09/15/2014     Priority: High     Dx 9/11/14.  10/14 hematology consult- felt that the DVT was not a coumadin failure- attributed to immobility s/p fall, lower INR during hospitalization/rehab stay.    2/19- coumadin switched to xarelto via cardiology.       Breast fibroadenoma 08/18/2014     Priority: High     1/14- rec f/u mammogram in a year.       Paroxysmal atrial fibrillation (H) 11/04/2011     Priority: High     Following with Dr. Rosa.    Watchman procedure 9/22 due to intolerance to anticoagulation due to GI bleeding w/ GAVE--  S/p procedure- initial plan for xarelto x 45  days, then clodipogrel + asa x 6 months, then asa lifelong.      A.fib first Dx in 10/11 during inpt stay for diverticulitis which ultimately required partial colectomy.  Initially anticoagulated with warfarin.  Switched to xarelto in early '18.           Essential hypertension with goal blood pressure less than 140/90 10/19/2010     Priority: High     Torsemide and potassium- back on 3/23  Still off spironolactone, since hospital stay in 3/23.  toprol XL     On potassium since 2017).  Diltiazem stopped in 12/15 due to dizziness (discussed with Dr. Hurtado- switched to toprol XL).  Lisinopril 2.5mg/d stopped in 2/19 per Dr. Rosa, cardiology- due to low BPs..       Major depressive disorder, recurrent episode, moderate (H)      Priority: High     Lexapro 20mg/d.      10/17- switch from paxil 10mg/d to lexapro 10mg/d.  Pt had been on paxil 10/20mg/d for years  Trial off in 10/15 (concerned with wt gain).    Weaned off clonazepam.           Osteopenia of multiple sites 09/24/2004     Priority: High     Osteopenia with t-score of -2.1, started on fosamax through rheumatology in 6/20.    Osteopenia in '10, recheck '15.  Risk factors of PPI use and prednisone use since '12 (0-5mg/d).         Gastroenteritis 03/07/2023     Priority: Medium     Lactic acidosis 03/07/2023     Priority: Medium     Gout, unspecified cause, unspecified chronicity, unspecified site 01/05/2023     Priority: Medium     Followed by Dr. Lopez, rheumatology, and rheum MT.  Allopurinol started in 12/22 through rheum MT (1/23 visit w/ Dr. Lopez, noted pt deferred taking it, but rx for allopurinol sent that day).  PRN Kinaret injections for acute flares 100mg 2x/day x 3 days.       Vitamin D deficiency 11/30/2022     Priority: Medium     GAVE (gastric antral vascular ectasia) 11/30/2022     Priority: Medium     Following with GI with periodic EGDs when signs of GI bleeding- black stools, hgb drop.  11/22 EGD notes- There is mild residual  non-bleeding GAVE, treated with argon plasma coagulation (APC).--- This is usually seen secondary to medications such as bisphosphonates (alendronate) or oral iron supplements (ferrous sulfate), or NSAIDs.     -Pt working with MTM - looking at infusion options.       Iron deficiency anemia, unspecified iron deficiency anemia type 11/30/2022     Priority: Medium     GAVE related, BRBPR, hgb 8-9s in '22.  Improved/resolved after stopping anticoagulants (other than asa) after Watchman left atrial appendage device placed in 9/22.  Anticoagulation stepped down, xarelto stopped after 45 days, plavix + asa x 6 months, then daily asa lifelong.       Persistent atrial fibrillation (H) 09/26/2022     Priority: Medium     Seasonal allergic rhinitis due to pollen 03/23/2022     Priority: Medium     Claritin, flonase, singulair       Osteoarthritis of right hip 01/05/2021     Priority: Medium     Severe right hip pain - OA.  Following with Dr. Shannon.      Planning R hip replacement when anticoagulation issues stable.    Also following with pain clinic, Dr. Alba.  Oxycodone.           Urge incontinence of urine 10/02/2019     Priority: Medium     10/19 consultation with Dr. Nassar- improved sx's with adjusting diuretic dosing and more frequent bathroom breaks.  Torsemide dosing switched to 40mg in am and 30mg in pm (from other way around).  Topical estradiol trial in late '21.       Stage 3a chronic kidney disease (H) 09/03/2019     Priority: Medium     Inflammatory arthritis 11/09/2017     Priority: Medium     Inflam jt issue- shoulder, hp, generalized jt pains resolved with prednisone- dx with Sjogrens - initial dx pallendromic rheumatism vs pseudogout per rheum- stabilized on prednisone.       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  Followed by [facility] and rheumatology.    ILD conference- thought Follicular bronchiolitis  -started on flovent, azithromycin and montelukast in 4/16.  4/17- off home O2 (had been on for activity) since 4/17 hospitalization txt of pneumonia/influenza/acute respiratory failure.       ANGELICA (obstructive sleep apnea) 04/12/2016     Priority: Medium     Dr. Rolle following.  BiLevel (levels lowered in 2/21)    Sleep study in '16, mild sleep apnea, but significant sleep hypoventilation.  Started on CPAP, not helpful, so put on bilevel PAP through Dr. Chandler.       Enlarged lymph node 08/04/2015     Priority: Medium     Gastroesophageal reflux disease without esophagitis 07/23/2015     Priority: Medium     Pantoprazole 40mg/d     Pantoprazole restarted in 3/22 after mistakenly placed on pepcid instead for about the past year- hgb drop noted in ~2/22.  PPI started in 11/11 (when hospitalized for colonic abscess).       Rib pain 10/31/2014     Priority: Medium     S/p severe fall down stairs in 8/14- hospitalized for a few days, then in rehab for a couple weeks.  Significant swelling, no fx's.  INR dropped for a bit between hosp/rehab, and pt developed DVT in 9/14 in R leg.       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  A.fib started during with fluid overload during one of these hospitalizations.  Colostomy takedown in 2/12.  F/u colonoscopy in 12/12 (Dr. Canseco) looked good- rec next f/u colonoscopy in 10 yrs.           Health Care Home 02/09/2012     Priority: Low              Left ventricular hypertrophy 11/04/2011     Priority: Low     Advanced directives, counseling/discussion 11/01/2011     Priority: Low      Received outside advance directive.  HCD:Previously signed by patient and notarized by [employer].  scanned into EMR as Advance Directive/Living Will document. View document and details in Code Status History Report. Please see advance directive for specifics.   Health care directive (FIVE WISHES) reviewed and documented.  5/10/12 MALIKA Aguilar LPN        Received outside DNR form.    scanned and placed behind media tab.  Please see DNR form for specifics. Routed to certified facilitator for review and further documentation.  DNR form reviewed and documented.  11/1/11 MALIKA Aguilar LPN  In media tab under date 10/14/11.  Lev Tristan MD           Alcohol abuse, in remission 09/24/2004     Priority: Low     Temporomandibular joint disorder 11/24/2003     Priority: Low     Problem list name updated by automated process. Provider to review        Past Medical History:   Diagnosis Date     Alcohol abuse, in remission      Allergic rhinitis, cause unspecified     allegra helps when she takes it     Antiplatelet or antithrombotic long-term use      Atrial fibrillation (H)     in hosp in 11/11 after surgery w/ fluid overload     Cardiomegaly     LVH on stress echo- cardiac w/u at [facility]- neg CT scan for PE, neg stress echo in 8/06     Chest pain, unspecified      Congestive heart failure (H)      Depressive disorder      Diabetes (H)      Disorder of bone and cartilage, unspecified     osteopenia (had been on prempro), improved on 6/06 dexa, stable dexa 11/10     Diverticulosis of colon (without mention of hemorrhage)     last episode yrs ago     Essential hypertension, benign      Follicular bronchiolitis (H)     associated with Sjogrens, dx by chest CT showing mosaic attenuation and air trapping     Gastro-oesophageal reflux disease      ILD (interstitial lung disease) (H)     associated with Sjogrens, also has mildly elevated IgG4, first noted on chest CT 2015 (mild changes) and also has  small airways disease; ILD improved on follow up chest CT 2018.     Insomnia, unspecified     weaned off clonazepam     Irregular heart beat      Lumbago 07/2009    MRI with DJD, now seeing Dr. Cain for sciatic sx's     Major depressive disorder, recurrent episode, moderate (H)      Obstructive sleep apnea     uses cpap     Osteoarthrosis, unspecified whether generalized or localized, unspecified site      Sjogren's syndrome (H)     + RG and SSA and lip bx     Sleep apnea     uses a cpap machine     Tobacco use disorder     chantix in 9/07, started again in 6/08, working     Past Surgical History:   Procedure Laterality Date     APPENDECTOMY       BACK SURGERY  1962     BACK SURGERY  1962     BIOPSY BREAST  09/27/2002    Biopsy Left Breast     BIOPSY BREAST Left 09/27/2002     BREAST SURGERY       CARDIAC SURGERY       CARDIAC SURGERY       CHOLECYSTECTOMY  1990's?     CHOLECYSTECTOMY       COLECTOMY LEFT  11/07/2011    Procedure:COLECTOMY LEFT; Laparoscopic mobilization of splenic flexture, sigmoid colectomy, coloprotoscopy, loop illeostomy; Surgeon:CK CASTANEDA; Location:[facility]     COLECTOMY LEFT  11/07/2011     COLONOSCOPY  1990,s     COLONOSCOPY N/A 5/3/2022    Procedure: COLONOSCOPY;  Surgeon: Guru Winsome Dias MD;  Location:  GI     CV LEFT ATRIAL APPENDAGE CLOSURE N/A 9/26/2022    Procedure: Left Atrial Appendage Closure;  Surgeon: Uzair Crews MD;  Location:  [facility]     ENT SURGERY       EYE SURGERY  2012     FLEXIBLE SIGMOIDOSCOPY  11/03/2011     HYSTERECTOMY TOTAL ABDOMINAL, BILATERAL SALPINGO-OOPHORECTOMY, COMBINED  11/07/2011    Procedure:COMBINED HYSTERECTOMY TOTAL ABDOMINAL, BILATERAL SALPINGO-OOPHORECTOMY; total abdominal hysterectomy, bilateral salpingo-oophorectomy; Surgeon:ALETA MANUEL; Location:[facility]     HYSTERECTOMY TOTAL ABDOMINAL, BILATERAL SALPINGO-OOPHORECTOMY, COMBINED  11/07/2011     ILEOSTOMY  02/01/2012    takedown loop ileostomy       INSERT STENT URETER  11/07/2011    Procedure:INSERT STENT URETER; Placement of Bilateral Ureteral Stents ; Surgeon:PRANEETH BRYANT; Location:[facility]     SIGMOIDECTOMY  02/01/2012    Dr. Siddiqi, Sigmoidectomy for diverticular abscess, iliostomy afterwards until repair     SIGMOIDOSCOPY FLEXIBLE  11/03/2011    Procedure:SIGMOIDOSCOPY FLEXIBLE; Flexible Sigmoidoscopy; Surgeon:CK SIDDIQI; Location:[facility]     TAKEDOWN ILEOSTOMY  02/01/2012    Procedure:TAKEDOWN ILEOSTOMY; Takedown Loop Ileostomy ; Surgeon:CK SIDDIQI; Location:[facility]     URETERAL STENT PLACEMENT  11/07/2011     Z APPENDECTOMY  1970's?     Z NONSPECIFIC PROCEDURE  11/2005    exploratory abd lap, adhesions, resolved RLQ pain, diverticulitis episodes     Current Outpatient Medications   Medication Sig Dispense Refill     ACCU-CHEK SHANNON PLUS test strip USE TO TEST BLOOD SUGARS 3 TIMES DAILY 300 strip 5     acetaminophen (TYLENOL) 500 MG tablet Take 1,000 mg by mouth every 8 hours as needed (max 6 tablets/24 hours, 2 tablets/dose)       acyclovir (ZOVIRAX) 400 MG tablet Take 1 tablet (400 mg) by mouth every 8 hours For a couple days 15 tablet 2     acyclovir (ZOVIRAX) 5 % external ointment Apply topically as needed (6 times day PRN for outbreaks) As needed for outbreaks 15 g 3     albuterol (PROAIR HFA/PROVENTIL HFA/VENTOLIN HFA) 108 (90 Base) MCG/ACT inhaler Inhale 2 puffs into the lungs every 6 hours 18 g 11     alendronate (FOSAMAX) 70 MG tablet TAKE 1 TABLET BY MOUTH EVERY 7 DAYS 4 tablet 1     allopurinol (ZYLOPRIM) 100 MG tablet Take 1 tablet (100 mg) by mouth daily 90 tablet 1     anakinra (KINERET) 100 MG/0.67ML SOSY injection 100 mg every day x 3 days as needed for flare ups 6.7 mL 3     aspirin (ASA) 81 MG EC tablet Take 1 tablet (81 mg) by mouth daily 90 tablet 1     azithromycin (ZITHROMAX) 250 MG tablet TAKE 1 TABLET BY MOUTH EVERY DAY 30 tablet 5     BD PEN NEEDLE JANE 2ND GEN 32G X 4 MM miscellaneous USE TO TEST 4 TIMES A DAY  400 each 1     blood glucose (NO BRAND SPECIFIED) lancets standard Use to test blood sugar 3 times daily or as directed 100 lancet 3     cevimeline (EVOXAC) 30 MG capsule Take 1 capsule (30 mg) by mouth 3 times daily (Patient taking differently: Take 30 mg by mouth 3 times daily as needed) 90 capsule 11     Cholecalciferol (VITAMIN D3) 50 MCG (2000 UT) CAPS TAKE 100 MCG BY MOUTH DAILY (TAKE 2 TABLET (50 MCG) BY MOUTH DAILY - ORAL) 180 capsule 0     COMPOUNDED NON-CONTROLLED SUBSTANCE (CMPD RX) - PHARMACY TO MIX COMPOUNDED MEDICATION Estriol 1 mg/g Apply small amount to finger and apply to inside vagina daily for 2 weeks then twice weekly Route: vaginally Dispense 30 grams 11 refills 30 g 11     cyanocobalamin (VITAMIN B-12) 1000 MCG tablet Take 1 tablet (1,000 mcg) by mouth daily (Patient taking differently: Take 1,000 mcg by mouth three times a week) 30 tablet 1     escitalopram (LEXAPRO) 20 MG tablet TAKE 1 TABLET BY MOUTH EVERY DAY 90 tablet 1     ferrous gluconate (FERGON) 324 (38 Fe) MG tablet Take 1 tablet (324 mg) by mouth daily (with breakfast) 90 tablet 1     fluticasone (FLONASE) 50 MCG/ACT nasal spray SPRAY 1-2 SPRAYS INTO BOTH NOSTRILS DAILY AS NEEDED FOR ALLERGIES 16 mL 11     fluticasone-vilanterol (BREO ELLIPTA) 100-25 MCG/INH inhaler Inhale 1 puff into the lungs daily 1 each 11     hydroxychloroquine (PLAQUENIL) 200 MG tablet Take 2 tablets (400 mg) by mouth daily Get annual eye exams for hydroxychloroquine (Plaquenil) monitoring and fax to [phone] 180 tablet 3     insulin lispro (HUMALOG KWIKPEN) 100 UNIT/ML (1 unit dial) KWIKPEN Inject 8 Units Subcutaneous 3 times daily (before meals)       ketoconazole (NIZORAL) 2 % external cream Apply topically 2 times daily as needed for itching 60 g 1     KLOR-CON 20 MEQ CR tablet TAKE 2 TABLETS (40 MEQ) BY MOUTH EVERY MORNING AND 1 TABLET (20 MEQ) EVERY EVENING. 270 tablet 3     lidocaine (LIDODERM) 5 % patch APPLY PATCH TO PAINFUL AREA FOR UP TO 12 H  WITHIN A 24 H PERIOD. REMOVE AFTER 12 HOURS. (Patient taking differently: Apply patch to painful area for up to 12 h within a 24 h period.  Remove after 12 hours.) 30 patch 2     loratadine (CLARITIN) 10 MG tablet TAKE 1 TABLET BY MOUTH EVERY DAY 90 tablet 3     methocarbamol (ROBAXIN) 750 MG tablet Take 1 tablet (750 mg) by mouth 3 times daily as needed for muscle spasms 90 tablet 3     metoprolol succinate ER (TOPROL XL) 50 MG 24 hr tablet Take 1 tablet (50 mg) by mouth 2 times daily 180 tablet 3     montelukast (SINGULAIR) 10 MG tablet TAKE 1 TABLET BY MOUTH EVERYDAY AT BEDTIME 90 tablet 0     oxyCODONE-acetaminophen (PERCOCET) 7.5-325 MG per tablet Take 1 tablet by mouth 2 times daily as needed for severe pain With at least 4 hours between doses. Ok to fill and start 23 60 tablet 0     OZEMPIC, 1 MG/DOSE, 4 MG/3ML pen INJECT 1 MG SUBCUTANEOUS ONCE A WEEK 9 mL 1     pantoprazole (PROTONIX) 40 MG EC tablet Take 1 tablet (40 mg) by mouth daily (replaces famotidine- should stop taking famotidine) 90 tablet 3     predniSONE (DELTASONE) 5 MG tablet Take 1 tablet (5 mg) by mouth daily 90 tablet 1     torsemide (DEMADEX) 20 MG tablet Take 2 tablets (40 mg) by mouth every morning AND 1 tablet (20 mg) every evening. 270 tablet 3     traZODone (DESYREL) 50 MG tablet TAKE 3 TABLETS (150 MG) BY MOUTH NIGHTLY AS NEEDED FOR SLEEP 270 tablet 0     atorvastatin (LIPITOR) 10 MG tablet TAKE 1/2 TABLET BY MOUTH EVERY DAY 45 tablet 3       Allergies   Allergen Reactions     Amoxicillin-Pot Clavulanate Nausea and Vomiting     Amoxicillin-Pot Clavulanate      Codeine Nausea and Vomiting     PN: LW Reaction: HIVES     Penicillins Nausea and Vomiting     PN: LW Reaction: GI Upset     Phenobarbital Itching     Seasonal Allergies         Social History     Tobacco Use     Smoking status: Former     Packs/day: 0.50     Types: Cigarettes     Start date:      Quit date: 2011     Years since quittin.7     Smokeless tobacco:  "Never     Tobacco comments:     1/2 ppd   Vaping Use     Vaping status: Never Used   Substance Use Topics     Alcohol use: No     Alcohol/week: 0.0 standard drinks of alcohol     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       History   Drug Use No         Objective     /62 (BP Location: Right arm, Patient Position: Sitting, Cuff Size: Adult Regular)   Pulse 52   Temp 97.7  F (36.5  C) (Temporal)   Resp 16   Ht 1.664 m (5' 5.5\")   Wt 78.9 kg (174 lb)   SpO2 97%   BMI 28.51 kg/m      Physical Exam    GENERAL APPEARANCE: healthy, alert and no distress     EYES: EOMI, PERRL     HENT: ear canals and TM's normal and nose and mouth without ulcers or lesions     NECK: no adenopathy, no asymmetry, masses, or scars and thyroid normal to palpation     RESP: lungs clear to auscultation - no rales, rhonchi or wheezes     CV: regular rates and rhythm, normal S1 S2, no S3 or S4 and no murmur, click or rub     ABDOMEN:  soft, nontender, no HSM or masses and bowel sounds normal     MS: extremities normal- no gross deformities noted, no evidence of inflammation in joints, FROM in all extremities.     SKIN: no suspicious lesions or rashes     NEURO: Normal strength and tone, sensory exam grossly normal, mentation intact and speech normal     PSYCH: mentation appears normal. and affect normal/bright     LYMPHATICS: No cervical adenopathy    Recent Labs   Lab Test 05/04/23  1328 04/13/23  1229 03/31/23  1404 03/21/23  1557 03/14/23  1657 03/09/23  0626 11/28/22  1024 11/10/22  1635 10/05/22  1231 09/23/22  1221   HGB  --   --  12.5 13.0 11.1* 10.7*   < > 8.1*   < > 12.0   PLT  --   --   --  286 214 141*   < > 224   < > 212   INR  --   --   --   --   --   --   --  1.43*  --  1.37*    141 139 137 139 141   < > 140   < > 141   POTASSIUM 3.7 3.6 3.6 4.3 3.8 3.6   < > 4.3   < > 3.5   CR 0.92 0.99* 1.03* 1.01* 0.76 0.81   < > 1.19*   < > 1.17*   A1C  --   --   --   --  7.3* 7.3*   < >  --   --   --     < > = values in this " interval not displayed.        Diagnostics:    Recent Results (from the past 168 hour(s))   Comprehensive metabolic panel (BMP + Alb, Alk Phos, ALT, AST, Total. Bili, TP)    Collection Time: 05/10/23 12:17 PM   Result Value Ref Range    Sodium 141 136 - 145 mmol/L    Potassium 4.0 3.4 - 5.3 mmol/L    Chloride 102 98 - 107 mmol/L    Carbon Dioxide (CO2) 27 22 - 29 mmol/L    Anion Gap 12 7 - 15 mmol/L    Urea Nitrogen 18.8 8.0 - 23.0 mg/dL    Creatinine 0.95 0.51 - 0.95 mg/dL    Calcium 9.5 8.8 - 10.2 mg/dL    Glucose 176 (H) 70 - 99 mg/dL    Alkaline Phosphatase 85 35 - 104 U/L    AST 21 10 - 35 U/L    ALT 13 10 - 35 U/L    Protein Total 7.3 6.4 - 8.3 g/dL    Albumin 4.1 3.5 - 5.2 g/dL    Bilirubin Total 0.4 <=1.2 mg/dL    GFR Estimate 59 (L) >60 mL/min/1.73m2   CBC with platelets    Collection Time: 05/10/23 12:17 PM   Result Value Ref Range    WBC Count 7.8 4.0 - 11.0 10e3/uL    RBC Count 4.41 3.80 - 5.20 10e6/uL    Hemoglobin 12.8 11.7 - 15.7 g/dL    Hematocrit 40.3 35.0 - 47.0 %    MCV 91 78 - 100 fL    MCH 29.0 26.5 - 33.0 pg    MCHC 31.8 31.5 - 36.5 g/dL    RDW 15.3 (H) 10.0 - 15.0 %    Platelet Count 205 150 - 450 10e3/uL   General PFT Lab (Please always keep checked)    Collection Time: 05/11/23 11:19 AM   Result Value Ref Range    FVC-Pred 2.39 L    FVC-Pre 1.96 L    FVC-%Pred-Pre 82 %    FEV1-Pre 1.54 L    FEV1-%Pred-Pre 84 %    FEV1FVC-Pred 77 %    FEV1FVC-Pre 79 %    FEFMax-Pred 4.73 L/sec    FEFMax-Pre 5.68 L/sec    FEFMax-%Pred-Pre 119 %    FEF2575-Pred 1.44 L/sec    FEF2575-Pre 1.33 L/sec    BON9491-%Pred-Pre 91 %    ExpTime-Pre 7.16 sec    FIFMax-Pre 2.90 L/sec    VC-Pred 2.98 L    VC-Pre 1.95 L    VC-%Pred-Pre 65 %    IC-Pred 2.52 L    IC-Pre 1.61 L    IC-%Pred-Pre 63 %    ERV-Pred 0.45 L    ERV-Pre 0.34 L    ERV-%Pred-Pre 75 %    FEV1FEV6-Pred 77 %    FEV1FEV6-Pre 79 %    DLCOunc-Pred 18.82 ml/min/mmHg    DLCOunc-Pre 17.79 ml/min/mmHg    DLCOunc-%Pred-Pre 94 %    DLCOcor-Pre 18.13 ml/min/mmHg     DLCOcor-%Pred-Pre 96 %    VA-Pre 3.64 L    VA-%Pred-Pre 78 %    FEV1SVC-Pred 61 %    FEV1SVC-Pre 79 %         No EKG this visit, will defer to cardiology visit on 5/24/23.      Revised Cardiac Risk Index (RCRI):  The patient has the following serious cardiovascular risks for perioperative complications:   - Congestive Heart Failure (pulmonary edema, PND, s3 maulik, CXR with pulmonary congestion, basilar rales) = 1 point   - Diabetes Mellitus (on Insulin) = 1 point     RCRI Interpretation: 2 points: Class III (moderate risk - 6.6% complication rate)     Estimated Functional Capacity: CANNOT perform 4 METS without symptoms           Signed Electronically by: Ilene Tristan MD  Copy of this evaluation report is provided to requesting physician.      Answers for HPI/ROS submitted by the patient on 5/10/2023  If you checked off any problems, how difficult have these problems made it for you to do your work, take care of things at home, or get along with other people?: Not difficult at all  PHQ9 TOTAL SCORE: 6

## 2023-05-10 NOTE — PATIENT INSTRUCTIONS
For informational purposes only. Not to replace the advice of your health care provider. Copyright   2003,  [employer] [facility]. All rights reserved. Clinically reviewed by Joanne Weaver MD. [employer] [id number] - REV .  Preparing for Your Surgery  Getting started  A nurse will call you to review your health history and instructions. They will give you an arrival time based on your scheduled surgery time. Please be ready to share:  Your doctor's clinic name and phone number  Your medical, surgical, and anesthesia history  A list of allergies and sensitivities  A list of medicines, including herbal treatments and over-the-counter drugs  Whether the patient has a legal guardian (ask how to send us the papers in advance)  Please tell us if you're pregnant--or if there's any chance you might be pregnant. Some surgeries may injure a fetus (unborn baby), so they require a pregnancy test. Surgeries that are safe for a fetus don't always need a test, and you can choose whether to have one.   If you have a child who's having surgery, please ask for a copy of Preparing for Your Child's Surgery.    Preparing for surgery  Within 10 to 30 days of surgery: Have a pre-op exam (sometimes called an H&P, or History and Physical). This can be done at a clinic or pre-operative center.  If you're having a , you may not need this exam. Talk to your care team.  At your pre-op exam, talk to your care team about all medicines you take. If you need to stop any medicines before surgery, ask when to start taking them again.  We do this for your safety. Many medicines can make you bleed too much during surgery. Some change how well surgery (anesthesia) drugs work.  Call your insurance company to let them know you're having surgery. (If you don't have insurance, call [phone].)  Call your clinic if there's any change in your health. This includes signs of a cold or flu (sore throat, runny nose, cough, rash, fever). It  also includes a scrape or scratch near the surgery site.  If you have questions on the day of surgery, call your hospital or surgery center.  Eating and drinking guidelines  For your safety: Unless your surgeon tells you otherwise, follow the guidelines below.  Eat and drink as usual until 8 hours before you arrive for surgery. After that, no food or milk.  Drink clear liquids until 2 hours before you arrive. These are liquids you can see through, like water, Gatorade, and Propel Water. They also include plain black coffee and tea (no cream or milk), candy, and breath mints. You can spit out gum when you arrive.  If you drink alcohol: Stop drinking it the night before surgery.  If your care team tells you to take medicine on the morning of surgery, it's okay to take it with a sip of water.  Preventing infection  Shower or bathe the night before and morning of your surgery. Follow the instructions your clinic gave you. (If no instructions, use regular soap.)  Don't shave or clip hair near your surgery site. We'll remove the hair if needed.  Don't smoke or vape the morning of surgery. You may chew nicotine gum up to 2 hours before surgery. A nicotine patch is okay.  Note: Some surgeries require you to completely quit smoking and nicotine. Check with your surgeon.  Your care team will make every effort to keep you safe from infection. We will:  Clean our hands often with soap and water (or an alcohol-based hand rub).  Clean the skin at your surgery site with a special soap that kills germs.  Give you a special gown to keep you warm. (Cold raises the risk of infection.)  Wear special hair covers, masks, gowns and gloves during surgery.  Give antibiotic medicine, if prescribed. Not all surgeries need antibiotics.  What to bring on the day of surgery  Photo ID and insurance card  Copy of your health care directive, if you have one  Glasses and hearing aids (bring cases)  You can't wear contacts during surgery  Inhaler and  eye drops, if you use them (tell us about these when you arrive)  CPAP machine or breathing device, if you use them  A few personal items, if spending the night  If you have . . .  A pacemaker, ICD (cardiac defibrillator) or other implant: Bring the ID card.  An implanted stimulator: Bring the remote control.  A legal guardian: Bring a copy of the certified (court-stamped) guardianship papers.  Please remove any jewelry, including body piercings. Leave jewelry and other valuables at home.  If you're going home the day of surgery  You must have a responsible adult drive you home. They should stay with you overnight as well.  If you don't have someone to stay with you, and you aren't safe to go home alone, we may keep you overnight. Insurance often won't pay for this.  After surgery  If it's hard to control your pain or you need more pain medicine, please call your surgeon's office.  Questions?   If you have any questions for your care team, list them here: _________________________________________________________________________________________________________________________________________________________________________ ____________________________________ ____________________________________ ____________________________________    How to Take Your Medication Before Surgery  - Take all of your medications before surgery except as noted below  - HOLD (do not take) your torsemide on the morning of surgery (unless cardiology instructs otherwise).   - HOLD (do not take) Aspirin for 7 days  - HOLD (do not take) insulin or iron (ferrous gluconate) the morning of surgery  - STOP taking all vitamins and herbal supplements 7 days before surgery.

## 2023-05-11 ENCOUNTER — TELEPHONE (OUTPATIENT)
Dept: ORTHOPEDICS | Facility: CLINIC | Age: 83
End: 2023-05-11

## 2023-05-11 ENCOUNTER — OFFICE VISIT (OUTPATIENT)
Dept: PULMONOLOGY | Facility: CLINIC | Age: 83
End: 2023-05-11
Attending: INTERNAL MEDICINE
Payer: MEDICARE

## 2023-05-11 VITALS — SYSTOLIC BLOOD PRESSURE: 110 MMHG | HEART RATE: 46 BPM | OXYGEN SATURATION: 95 % | DIASTOLIC BLOOD PRESSURE: 68 MMHG

## 2023-05-11 DIAGNOSIS — J44.89 FOLLICULAR BRONCHIOLITIS (H): ICD-10-CM

## 2023-05-11 DIAGNOSIS — J44.89 FOLLICULAR BRONCHIOLITIS (H): Primary | ICD-10-CM

## 2023-05-11 PROCEDURE — 94729 DIFFUSING CAPACITY: CPT | Performed by: INTERNAL MEDICINE

## 2023-05-11 PROCEDURE — 94375 RESPIRATORY FLOW VOLUME LOOP: CPT | Performed by: INTERNAL MEDICINE

## 2023-05-11 PROCEDURE — 99215 OFFICE O/P EST HI 40 MIN: CPT | Mod: 25 | Performed by: INTERNAL MEDICINE

## 2023-05-11 PROCEDURE — G0463 HOSPITAL OUTPT CLINIC VISIT: HCPCS | Performed by: INTERNAL MEDICINE

## 2023-05-11 RX ORDER — ATORVASTATIN CALCIUM 10 MG/1
TABLET, FILM COATED ORAL
Qty: 45 TABLET | Refills: 3 | Status: SHIPPED | OUTPATIENT
Start: 2023-05-11 | End: 2024-07-08

## 2023-05-11 ASSESSMENT — PAIN SCALES - GENERAL: PAINLEVEL: NO PAIN (0)

## 2023-05-11 NOTE — PROGRESS NOTES
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  She had mild ILD on a previous chest CT scan in 2015, but follow-up chest CT scan in 2018 showed improvement with no obvious ILD present on the CT scan at that time.  The follicular bronchiolitis changes were still present.  She has been on prednisone for her Sjogren's for many years, and that is managed by Dr. Lopez in rheumatology. Her medications for follicular bronchiolitis include current prednisone dose 5 mg daily, azithromycin 250 mg daily, montelukast daily, and Breo Ellipta inhaler 100-25. These are all anti-inflammatories for her bronchiolitis.    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  She is scheduled to have right hip replacement on May 31.  She is not able to do physical activity because of hip pain, and mostly gets around in a wheelchair.  She does occasionally use a walker, and she feels short of breath when she does so.  She is quite deconditioned.  She denies cough or wheezing.    She wears CPAP at nighttime and denies paroxysmal nocturnal dyspnea.  She continues to have sicca symptoms, and Evoxac helps.  She had COVID-19 infection on March 22 and was treated with molnupiravir.  She also had norovirus infection.  She did receive the bivalent COVID-19 vaccine in October 2022.  She is wondering if she should get another bivalent COVID-19 booster shot.          Current Outpatient Medications   Medication     ACCU-CHEK SHANNON PLUS test strip     acetaminophen (TYLENOL) 500 MG tablet     acyclovir (ZOVIRAX) 400 MG tablet     acyclovir (ZOVIRAX) 5 % external ointment     albuterol (PROAIR HFA/PROVENTIL HFA/VENTOLIN HFA) 108 (90 Base) MCG/ACT inhaler      alendronate (FOSAMAX) 70 MG tablet     allopurinol (ZYLOPRIM) 100 MG tablet     anakinra (KINERET) 100 MG/0.67ML SOSY injection     aspirin (ASA) 81 MG EC tablet     atorvastatin (LIPITOR) 10 MG tablet     azithromycin (ZITHROMAX) 250 MG tablet     BD PEN NEEDLE JANE 2ND GEN 32G X 4 MM miscellaneous     blood glucose (NO BRAND SPECIFIED) lancets standard     cevimeline (EVOXAC) 30 MG capsule     Cholecalciferol (VITAMIN D3) 50 MCG (2000 UT) CAPS     COMPOUNDED NON-CONTROLLED SUBSTANCE (CMPD RX) - PHARMACY TO MIX COMPOUNDED MEDICATION     cyanocobalamin (VITAMIN B-12) 1000 MCG tablet     escitalopram (LEXAPRO) 20 MG tablet     ferrous gluconate (FERGON) 324 (38 Fe) MG tablet     fluticasone (FLONASE) 50 MCG/ACT nasal spray     fluticasone-vilanterol (BREO ELLIPTA) 100-25 MCG/INH inhaler     hydroxychloroquine (PLAQUENIL) 200 MG tablet     insulin lispro (HUMALOG KWIKPEN) 100 UNIT/ML (1 unit dial) KWIKPEN     ketoconazole (NIZORAL) 2 % external cream     KLOR-CON 20 MEQ CR tablet     lidocaine (LIDODERM) 5 % patch     loratadine (CLARITIN) 10 MG tablet     methocarbamol (ROBAXIN) 750 MG tablet     metoprolol succinate ER (TOPROL XL) 50 MG 24 hr tablet     montelukast (SINGULAIR) 10 MG tablet     oxyCODONE-acetaminophen (PERCOCET) 7.5-325 MG per tablet     OZEMPIC, 1 MG/DOSE, 4 MG/3ML pen     pantoprazole (PROTONIX) 40 MG EC tablet     predniSONE (DELTASONE) 5 MG tablet     torsemide (DEMADEX) 20 MG tablet     traZODone (DESYREL) 50 MG tablet     No current facility-administered medications for this visit.     Allergies   Allergen Reactions     Amoxicillin-Pot Clavulanate Nausea and Vomiting     Amoxicillin-Pot Clavulanate      Codeine Nausea and Vomiting     PN: LW Reaction: HIVES     Penicillins Nausea and Vomiting     PN: LW Reaction: GI Upset     Phenobarbital Itching     Seasonal Allergies      Past Medical History:   Diagnosis Date     Alcohol abuse, in remission      Allergic rhinitis, cause unspecified      allegra helps when she takes it     Antiplatelet or antithrombotic long-term use      Atrial fibrillation (H)     in hosp in 11/11 after surgery w/ fluid overload     Cardiomegaly     LVH on stress echo- cardiac w/u at [facility]- neg CT scan for PE, neg stress echo in 8/06     Chest pain, unspecified      Congestive heart failure (H)      Depressive disorder      Diabetes (H)      Disorder of bone and cartilage, unspecified     osteopenia (had been on prempro), improved on 6/06 dexa, stable dexa 11/10     Diverticulosis of colon (without mention of hemorrhage)     last episode yrs ago     Essential hypertension, benign      Follicular bronchiolitis (H)     associated with Sjogrens, dx by chest CT showing mosaic attenuation and air trapping     Gastro-oesophageal reflux disease      ILD (interstitial lung disease) (H)     associated with Sjogrens, also has mildly elevated IgG4, first noted on chest CT 2015 (mild changes) and also has small airways disease; ILD improved on follow up chest CT 2018.     Insomnia, unspecified     weaned off clonazepam     Irregular heart beat      Lumbago 07/2009    MRI with DJD, now seeing Dr. Cain for sciatic sx's     Major depressive disorder, recurrent episode, moderate (H)      Obstructive sleep apnea     uses cpap     Osteoarthrosis, unspecified whether generalized or localized, unspecified site      Sjogren's syndrome (H)     + RG and SSA and lip bx     Sleep apnea     uses a cpap machine     Tobacco use disorder     chantix in 9/07, started again in 6/08, working       Past Surgical History:   Procedure Laterality Date     APPENDECTOMY       BACK SURGERY  1962     BACK SURGERY  1962     BIOPSY BREAST  09/27/2002    Biopsy Left Breast     BIOPSY BREAST Left 09/27/2002     BREAST SURGERY       CARDIAC SURGERY       CARDIAC SURGERY       CHOLECYSTECTOMY  1990's?     CHOLECYSTECTOMY       COLECTOMY LEFT  11/07/2011    Procedure:COLECTOMY LEFT; Laparoscopic mobilization of splenic  flexture, sigmoid colectomy, coloprotoscopy, loop illeostomy; Surgeon:CK SIDDIQI; Location:[facility]     COLECTOMY LEFT  11/07/2011     COLONOSCOPY  1990,s     COLONOSCOPY N/A 5/3/2022    Procedure: COLONOSCOPY;  Surgeon: Guru Winsome Dias MD;  Location: [facility]     CV LEFT ATRIAL APPENDAGE CLOSURE N/A 9/26/2022    Procedure: Left Atrial Appendage Closure;  Surgeon: Uzair Crews MD;  Location: [facility]     ENT SURGERY       EYE SURGERY  2012     FLEXIBLE SIGMOIDOSCOPY  11/03/2011     HYSTERECTOMY TOTAL ABDOMINAL, BILATERAL SALPINGO-OOPHORECTOMY, COMBINED  11/07/2011    Procedure:COMBINED HYSTERECTOMY TOTAL ABDOMINAL, BILATERAL SALPINGO-OOPHORECTOMY; total abdominal hysterectomy, bilateral salpingo-oophorectomy; Surgeon:ALETA MANUEL; Location:[facility]     HYSTERECTOMY TOTAL ABDOMINAL, BILATERAL SALPINGO-OOPHORECTOMY, COMBINED  11/07/2011     ILEOSTOMY  02/01/2012    takedown loop ileostomy      INSERT STENT URETER  11/07/2011    Procedure:INSERT STENT URETER; Placement of Bilateral Ureteral Stents ; Surgeon:PRANEETH BRYANT; Location:[facility]     SIGMOIDECTOMY  02/01/2012    Dr. Siddiqi, Sigmoidectomy for diverticular abscess, iliostomy afterwards until repair     SIGMOIDOSCOPY FLEXIBLE  11/03/2011    Procedure:SIGMOIDOSCOPY FLEXIBLE; Flexible Sigmoidoscopy; Surgeon:CK SIDDIQI; Location:[facility]     TAKEDOWN ILEOSTOMY  02/01/2012    Procedure:TAKEDOWN ILEOSTOMY; Takedown Loop Ileostomy ; Surgeon:CK SDIDIQI; Location:[facility]     URETERAL STENT PLACEMENT  11/07/2011     ZC APPENDECTOMY  1970's?     ZZC NONSPECIFIC PROCEDURE  11/2005    exploratory abd lap, adhesions, resolved RLQ pain, diverticulitis episodes       Social History     Socioeconomic History     Marital status: Single     Spouse name: Not on file     Number of children: 0     Years of education: Ed Spec De     Highest education level: Not on file   Occupational History     Occupation: Professor     Employer:  [employer]     Comment: [employer]- Education   Tobacco Use     Smoking status: Former     Packs/day: 0.50     Types: Cigarettes     Start date:      Quit date: 2011     Years since quittin.7     Smokeless tobacco: Never     Tobacco comments:      ppd   Vaping Use     Vaping status: Never Used   Substance and Sexual Activity     Alcohol use: No     Alcohol/week: 0.0 standard drinks of alcohol     Comment: In recovery beginning      Drug use: No     Sexual activity: Never   Other Topics Concern     Parent/sibling w/ CABG, MI or angioplasty before 65F 55M? Yes   Social History Narrative                 Social Determinants of Health     Financial Resource Strain: Not on file   Food Insecurity: Not on file   Transportation Needs: Not on file   Physical Activity: Not on file   Stress: Not on file   Social Connections: Not on file   Intimate Partner Violence: Not on file   Housing Stability: Not on file       Family History   Problem Relation Age of Onset     C.A.D. Mother 63        MI- first at age 63     Heart Disease Mother      Hypertension Mother      Cerebrovascular Disease Mother      Hyperlipidemia Mother      Coronary Artery Disease Mother         MI-first at age 63     Other - See Comments Mother         cerebrovascular disease      Alcohol/Drug Father      Alzheimer Disease Father      Dementia Father      Hypertension Father      Hyperlipidemia Father      Substance Abuse Father      Diabetes Sister      Hypertension Sister      Hyperlipidemia Sister      Substance Abuse Sister      Asthma Sister      C.A.D. Sister 52        Minor MI- age 50's     Heart Disease Sister      Hypertension Sister      Hypertension Sister      Cancer - colorectal Sister 48        Late 40's early 50's     Gastrointestinal Disease Sister         Diverticulitis     Lipids Sister      Lipids Sister      Diabetes Sister      Heart Disease Sister         CHF     Cancer Sister          lung, smoker     Substance Abuse Sister      Asthma Sister      Cancer Sister      Coronary Artery Disease Sister         minor MI-age 50's     Heart Disease Sister      Hypertension Sister      Hypertension Sister      Colon Cancer Sister      Diverticulitis Sister      Lung Cancer Sister      Heart Disease Sister         CHF     Diabetes Sister      Asthma Sister      Hypertension Brother      Prostate Cancer Brother 74        Dx'd age 74     Gastrointestinal Disease Brother         Diverticulitis     Parkinsonism Brother      Substance Abuse Brother      Prostate Cancer Brother      Hyperlipidemia Brother      Hypertension Brother      Prostate Cancer Brother      Diverticulitis Brother      Parkinsonism Brother      Breast Cancer Daughter      Breast Cancer Daughter      Diabetes Other      Hypertension Other      Anesthesia Reaction No family hx of      Deep Vein Thrombosis (DVT) No family hx of            Vitals: /68   Pulse (!) 46   SpO2 95%     Exam:   GENERAL APPEARANCE: Well developed, well nourished, alert, and in no apparent distress.  RESP: good air flow throughout.  No crackles. No rhonchi. No wheezes.  No Inspiratory squeaks.  CV: Normal S1, S2, regular rhythm, normal rate. No murmur.  No LE edema.   MS: extremities normal. No clubbing. No cyanosis.  SKIN: no rash on limited exam.  NEURO: Mentation intact, speech normal.  Sitting in wheelchair.  PSYCH: mentation appears normal. and affect normal/bright.      Results:  Recent Results (from the past 168 hour(s))   Comprehensive metabolic panel (BMP + Alb, Alk Phos, ALT, AST, Total. Bili, TP)    Collection Time: 05/10/23 12:17 PM   Result Value Ref Range    Sodium 141 136 - 145 mmol/L    Potassium 4.0 3.4 - 5.3 mmol/L    Chloride 102 98 - 107 mmol/L    Carbon Dioxide (CO2) 27 22 - 29 mmol/L    Anion Gap 12 7 - 15 mmol/L    Urea Nitrogen 18.8 8.0 - 23.0 mg/dL    Creatinine 0.95 0.51 - 0.95 mg/dL    Calcium 9.5 8.8 - 10.2 mg/dL    Glucose 176  (H) 70 - 99 mg/dL    Alkaline Phosphatase 85 35 - 104 U/L    AST 21 10 - 35 U/L    ALT 13 10 - 35 U/L    Protein Total 7.3 6.4 - 8.3 g/dL    Albumin 4.1 3.5 - 5.2 g/dL    Bilirubin Total 0.4 <=1.2 mg/dL    GFR Estimate 59 (L) >60 mL/min/1.73m2   CBC with platelets    Collection Time: 05/10/23 12:17 PM   Result Value Ref Range    WBC Count 7.8 4.0 - 11.0 10e3/uL    RBC Count 4.41 3.80 - 5.20 10e6/uL    Hemoglobin 12.8 11.7 - 15.7 g/dL    Hematocrit 40.3 35.0 - 47.0 %    MCV 91 78 - 100 fL    MCH 29.0 26.5 - 33.0 pg    MCHC 31.8 31.5 - 36.5 g/dL    RDW 15.3 (H) 10.0 - 15.0 %    Platelet Count 205 150 - 450 10e3/uL   General PFT Lab (Please always keep checked)    Collection Time: 05/11/23 11:19 AM   Result Value Ref Range    FVC-Pred 2.39 L    FVC-Pre 1.96 L    FVC-%Pred-Pre 82 %    FEV1-Pre 1.54 L    FEV1-%Pred-Pre 84 %    FEV1FVC-Pred 77 %    FEV1FVC-Pre 79 %    FEFMax-Pred 4.73 L/sec    FEFMax-Pre 5.68 L/sec    FEFMax-%Pred-Pre 119 %    FEF2575-Pred 1.44 L/sec    FEF2575-Pre 1.33 L/sec    ZQP6617-%Pred-Pre 91 %    ExpTime-Pre 7.16 sec    FIFMax-Pre 2.90 L/sec    VC-Pred 2.98 L    VC-Pre 1.95 L    VC-%Pred-Pre 65 %    IC-Pred 2.52 L    IC-Pre 1.61 L    IC-%Pred-Pre 63 %    ERV-Pred 0.45 L    ERV-Pre 0.34 L    ERV-%Pred-Pre 75 %    FEV1FEV6-Pred 77 %    FEV1FEV6-Pre 79 %    DLCOunc-Pred 18.82 ml/min/mmHg    DLCOunc-Pre 17.79 ml/min/mmHg    DLCOunc-%Pred-Pre 94 %    DLCOcor-Pre 18.13 ml/min/mmHg    DLCOcor-%Pred-Pre 96 %    VA-Pre 3.64 L    VA-%Pred-Pre 78 %    FEV1SVC-Pred 61 %    FEV1SVC-Pre 79 %       I reviewed pulmonary function test that was performed today.  This shows normal spirometry and diffusing capacity, and lung function is stable and overall improved compared to 2016.    I reviewed results with the patient.      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   1.  Follicular bronchiolitis with Sjogren's.   Follow-up chest CT scan in 2018 showed no ILD.  She continues to have follicular bronchiolitis.  PFT is normal and stable and overall improved compared to 2016.  We will continue anti-inflammatories for her Sjogrens follicular bronchiolitis including prednisone 5 mg daily, azithromycin 250 mg daily, montelukast daily and Breo Ellipta 100- 25.  She will return in 6 months months with full PFT.  She is unable to do any physical exercise currently because of her hip pain.  She is scheduled for right hip surgery on May 31.  2.  Healthcare maintenance.  She qualifies for another shot of the bivalent COVID-19 booster.  I recommended that she ask her surgeon if it is okay for her to get that before her surgery.  I spent 42 minutes reviewing chart, talking with and examining patient, reviewing test results, formulating plan and documentation on the day of the encounter (excluding PFT review).

## 2023-05-11 NOTE — LETTER
5/11/2023         RE: Betty Tee  [address]  [facility] [address]        Dear Colleague,    Thank you for referring your patient, Betty Tee, to the [facility] L[facility]G [facility]ENCE AND [facility]IN[facility] [place]. Please see a copy of my visit note below.    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  She had mild ILD on a previous chest CT scan in 2015, but follow-up chest CT scan in 2018 showed improvement with no obvious ILD present on the CT scan at that time.  The follicular bronchiolitis changes were still present.  She has been on prednisone for her Sjogren's for many years, and that is managed by Dr. Lopez in rheumatology. Her medications for follicular bronchiolitis include current prednisone dose 5 mg daily, azithromycin 250 mg daily, montelukast daily, and Breo Ellipta inhaler 100-25. These are all anti-inflammatories for her bronchiolitis.    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  She is scheduled to have right hip replacement on May 31.  She is not able to do physical activity because of hip pain, and mostly gets around in a wheelchair.  She does occasionally use a walker, and she feels short of breath when she does so.  She is quite deconditioned.  She denies cough or wheezing.    She wears CPAP at nighttime and denies paroxysmal nocturnal dyspnea.  She continues to have sicca symptoms, and Evoxac helps.  She had COVID-19 infection on March 22 and was treated with molnupiravir.  She also had norovirus infection.  She did receive the bivalent COVID-19 vaccine in October 2022.  She is wondering if she should get another bivalent COVID-19 booster  shot.          Current Outpatient Medications   Medication    ACCU-CHEK SHANNON PLUS test strip    acetaminophen (TYLENOL) 500 MG tablet    acyclovir (ZOVIRAX) 400 MG tablet    acyclovir (ZOVIRAX) 5 % external ointment    albuterol (PROAIR HFA/PROVENTIL HFA/VENTOLIN HFA) 108 (90 Base) MCG/ACT inhaler    alendronate (FOSAMAX) 70 MG tablet    allopurinol (ZYLOPRIM) 100 MG tablet    anakinra (KINERET) 100 MG/0.67ML SOSY injection    aspirin (ASA) 81 MG EC tablet    atorvastatin (LIPITOR) 10 MG tablet    azithromycin (ZITHROMAX) 250 MG tablet    BD PEN NEEDLE JANE 2ND GEN 32G X 4 MM miscellaneous    blood glucose (NO BRAND SPECIFIED) lancets standard    cevimeline (EVOXAC) 30 MG capsule    Cholecalciferol (VITAMIN D3) 50 MCG (2000 UT) CAPS    COMPOUNDED NON-CONTROLLED SUBSTANCE (CMPD RX) - PHARMACY TO MIX COMPOUNDED MEDICATION    cyanocobalamin (VITAMIN B-12) 1000 MCG tablet    escitalopram (LEXAPRO) 20 MG tablet    ferrous gluconate (FERGON) 324 (38 Fe) MG tablet    fluticasone (FLONASE) 50 MCG/ACT nasal spray    fluticasone-vilanterol (BREO ELLIPTA) 100-25 MCG/INH inhaler    hydroxychloroquine (PLAQUENIL) 200 MG tablet    insulin lispro (HUMALOG KWIKPEN) 100 UNIT/ML (1 unit dial) KWIKPEN    ketoconazole (NIZORAL) 2 % external cream    KLOR-CON 20 MEQ CR tablet    lidocaine (LIDODERM) 5 % patch    loratadine (CLARITIN) 10 MG tablet    methocarbamol (ROBAXIN) 750 MG tablet    metoprolol succinate ER (TOPROL XL) 50 MG 24 hr tablet    montelukast (SINGULAIR) 10 MG tablet    oxyCODONE-acetaminophen (PERCOCET) 7.5-325 MG per tablet    OZEMPIC, 1 MG/DOSE, 4 MG/3ML pen    pantoprazole (PROTONIX) 40 MG EC tablet    predniSONE (DELTASONE) 5 MG tablet    torsemide (DEMADEX) 20 MG tablet    traZODone (DESYREL) 50 MG tablet     No current facility-administered medications for this visit.     Allergies   Allergen Reactions    Amoxicillin-Pot Clavulanate Nausea and Vomiting    Amoxicillin-Pot Clavulanate     Codeine Nausea and  Vomiting     PN: LW Reaction: HIVES    Penicillins Nausea and Vomiting     PN: LW Reaction: GI Upset    Phenobarbital Itching    Seasonal Allergies      Past Medical History:   Diagnosis Date    Alcohol abuse, in remission     Allergic rhinitis, cause unspecified     allegra helps when she takes it    Antiplatelet or antithrombotic long-term use     Atrial fibrillation (H)     in hosp in 11/11 after surgery w/ fluid overload    Cardiomegaly     LVH on stress echo- cardiac w/u at [facility]- neg CT scan for PE, neg stress echo in 8/06    Chest pain, unspecified     Congestive heart failure (H)     Depressive disorder     Diabetes (H)     Disorder of bone and cartilage, unspecified     osteopenia (had been on prempro), improved on 6/06 dexa, stable dexa 11/10    Diverticulosis of colon (without mention of hemorrhage)     last episode yrs ago    Essential hypertension, benign     Follicular bronchiolitis (H)     associated with Sjogrens, dx by chest CT showing mosaic attenuation and air trapping    Gastro-oesophageal reflux disease     ILD (interstitial lung disease) (H)     associated with Sjogrens, also has mildly elevated IgG4, first noted on chest CT 2015 (mild changes) and also has small airways disease; ILD improved on follow up chest CT 2018.    Insomnia, unspecified     weaned off clonazepam    Irregular heart beat     Lumbago 07/2009    MRI with DJD, now seeing Dr. Cain for sciatic sx's    Major depressive disorder, recurrent episode, moderate (H)     Obstructive sleep apnea     uses cpap    Osteoarthrosis, unspecified whether generalized or localized, unspecified site     Sjogren's syndrome (H)     + RG and SSA and lip bx    Sleep apnea     uses a cpap machine    Tobacco use disorder     chantix in 9/07, started again in 6/08, working       Past Surgical History:   Procedure Laterality Date    APPENDECTOMY      BACK SURGERY  1962    BACK SURGERY  1962    BIOPSY BREAST  09/27/2002    Biopsy Left Breast     BIOPSY BREAST Left 09/27/2002    BREAST SURGERY      CARDIAC SURGERY      CARDIAC SURGERY      CHOLECYSTECTOMY  1990's?    CHOLECYSTECTOMY      COLECTOMY LEFT  11/07/2011    Procedure:COLECTOMY LEFT; Laparoscopic mobilization of splenic flexture, sigmoid colectomy, coloprotoscopy, loop illeostomy; Surgeon:CK SIDDIQI; Location:[facility] OR    COLECTOMY LEFT  11/07/2011    COLONOSCOPY  1990,s    COLONOSCOPY N/A 5/3/2022    Procedure: COLONOSCOPY;  Surgeon: Guru Winsome Dias MD;  Location: [facility] GI    CV LEFT ATRIAL APPENDAGE CLOSURE N/A 9/26/2022    Procedure: Left Atrial Appendage Closure;  Surgeon: Uzair Crews MD;  Location:  HEART CARDIAC CATH LAB    ENT SURGERY      EYE SURGERY  2012    FLEXIBLE SIGMOIDOSCOPY  11/03/2011    HYSTERECTOMY TOTAL ABDOMINAL, BILATERAL SALPINGO-OOPHORECTOMY, COMBINED  11/07/2011    Procedure:COMBINED HYSTERECTOMY TOTAL ABDOMINAL, BILATERAL SALPINGO-OOPHORECTOMY; total abdominal hysterectomy, bilateral salpingo-oophorectomy; Surgeon:ALETA MANUEL; Location:[facility]    HYSTERECTOMY TOTAL ABDOMINAL, BILATERAL SALPINGO-OOPHORECTOMY, COMBINED  11/07/2011    ILEOSTOMY  02/01/2012    takedown loop ileostomy     INSERT STENT URETER  11/07/2011    Procedure:INSERT STENT URETER; Placement of Bilateral Ureteral Stents ; Surgeon:PRANEETH BRYANT; Location:[facility] OR    SIGMOIDECTOMY  02/01/2012    Dr. Siddiqi, Sigmoidectomy for diverticular abscess, iliostomy afterwards until repair    SIGMOIDOSCOPY FLEXIBLE  11/03/2011    Procedure:SIGMOIDOSCOPY FLEXIBLE; Flexible Sigmoidoscopy; Surgeon:CK SIDDIQI; Location:[facility]    TAKEDOWN ILEOSTOMY  02/01/2012    Procedure:TAKEDOWN ILEOSTOMY; Takedown Loop Ileostomy ; Surgeon:CK SIDDIQI; Location:[facility] OR    URETERAL STENT PLACEMENT  11/07/2011    ZZC APPENDECTOMY  1970's?    ZZC NONSPECIFIC PROCEDURE  11/2005    exploratory abd lap, adhesions, resolved RLQ pain, diverticulitis episodes       Social History     Socioeconomic History     Marital status: Single     Spouse name: Not on file    Number of children: 0    Years of education: Ed Spec De    Highest education level: Not on file   Occupational History    Occupation: Professor     Employer: [employer]     Comment: [employer]- Education   Tobacco Use    Smoking status: Former     Packs/day: 0.50     Types: Cigarettes     Start date:      Quit date: 2011     Years since quittin.7    Smokeless tobacco: Never    Tobacco comments:      ppd   Vaping Use    Vaping status: Never Used   Substance and Sexual Activity    Alcohol use: No     Alcohol/week: 0.0 standard drinks of alcohol     Comment: In recovery beginning     Drug use: No    Sexual activity: Never   Other Topics Concern    Parent/sibling w/ CABG, MI or angioplasty before 65F 55M? Yes   Social History Narrative                 Social Determinants of Health     Financial Resource Strain: Not on file   Food Insecurity: Not on file   Transportation Needs: Not on file   Physical Activity: Not on file   Stress: Not on file   Social Connections: Not on file   Intimate Partner Violence: Not on file   Housing Stability: Not on file       Family History   Problem Relation Age of Onset    C.A.D. Mother 63        MI- first at age 63    Heart Disease Mother     Hypertension Mother     Cerebrovascular Disease Mother     Hyperlipidemia Mother     Coronary Artery Disease Mother         MI-first at age 63    Other - See Comments Mother         cerebrovascular disease     Alcohol/Drug Father     Alzheimer Disease Father     Dementia Father     Hypertension Father     Hyperlipidemia Father     Substance Abuse Father     Diabetes Sister     Hypertension Sister     Hyperlipidemia Sister     Substance Abuse Sister     Asthma Sister     C.A.D. Sister 52        Minor MI- age 50's    Heart Disease Sister     Hypertension Sister     Hypertension Sister     Cancer - colorectal Sister 48        Late 40's  early 50's    Gastrointestinal Disease Sister         Diverticulitis    Lipids Sister     Lipids Sister     Diabetes Sister     Heart Disease Sister         CHF    Cancer Sister         lung, smoker    Substance Abuse Sister     Asthma Sister     Cancer Sister     Coronary Artery Disease Sister         minor MI-age 50's    Heart Disease Sister     Hypertension Sister     Hypertension Sister     Colon Cancer Sister     Diverticulitis Sister     Lung Cancer Sister     Heart Disease Sister         CHF    Diabetes Sister     Asthma Sister     Hypertension Brother     Prostate Cancer Brother 74        Dx'd age 74    Gastrointestinal Disease Brother         Diverticulitis    Parkinsonism Brother     Substance Abuse Brother     Prostate Cancer Brother     Hyperlipidemia Brother     Hypertension Brother     Prostate Cancer Brother     Diverticulitis Brother     Parkinsonism Brother     Breast Cancer Daughter     Breast Cancer Daughter     Diabetes Other     Hypertension Other     Anesthesia Reaction No family hx of     Deep Vein Thrombosis (DVT) No family hx of            Vitals: /68   Pulse (!) 46   SpO2 95%     Exam:   GENERAL APPEARANCE: Well developed, well nourished, alert, and in no apparent distress.  RESP: good air flow throughout.  No crackles. No rhonchi. No wheezes.  No Inspiratory squeaks.  CV: Normal S1, S2, regular rhythm, normal rate. No murmur.  No LE edema.   MS: extremities normal. No clubbing. No cyanosis.  SKIN: no rash on limited exam.  NEURO: Mentation intact, speech normal.  Sitting in wheelchair.  PSYCH: mentation appears normal. and affect normal/bright.      Results:  Recent Results (from the past 168 hour(s))   Comprehensive metabolic panel (BMP + Alb, Alk Phos, ALT, AST, Total. Bili, TP)    Collection Time: 05/10/23 12:17 PM   Result Value Ref Range    Sodium 141 136 - 145 mmol/L    Potassium 4.0 3.4 - 5.3 mmol/L    Chloride 102 98 - 107 mmol/L    Carbon Dioxide (CO2) 27 22 - 29 mmol/L     Anion Gap 12 7 - 15 mmol/L    Urea Nitrogen 18.8 8.0 - 23.0 mg/dL    Creatinine 0.95 0.51 - 0.95 mg/dL    Calcium 9.5 8.8 - 10.2 mg/dL    Glucose 176 (H) 70 - 99 mg/dL    Alkaline Phosphatase 85 35 - 104 U/L    AST 21 10 - 35 U/L    ALT 13 10 - 35 U/L    Protein Total 7.3 6.4 - 8.3 g/dL    Albumin 4.1 3.5 - 5.2 g/dL    Bilirubin Total 0.4 <=1.2 mg/dL    GFR Estimate 59 (L) >60 mL/min/1.73m2   CBC with platelets    Collection Time: 05/10/23 12:17 PM   Result Value Ref Range    WBC Count 7.8 4.0 - 11.0 10e3/uL    RBC Count 4.41 3.80 - 5.20 10e6/uL    Hemoglobin 12.8 11.7 - 15.7 g/dL    Hematocrit 40.3 35.0 - 47.0 %    MCV 91 78 - 100 fL    MCH 29.0 26.5 - 33.0 pg    MCHC 31.8 31.5 - 36.5 g/dL    RDW 15.3 (H) 10.0 - 15.0 %    Platelet Count 205 150 - 450 10e3/uL   General PFT Lab (Please always keep checked)    Collection Time: 05/11/23 11:19 AM   Result Value Ref Range    FVC-Pred 2.39 L    FVC-Pre 1.96 L    FVC-%Pred-Pre 82 %    FEV1-Pre 1.54 L    FEV1-%Pred-Pre 84 %    FEV1FVC-Pred 77 %    FEV1FVC-Pre 79 %    FEFMax-Pred 4.73 L/sec    FEFMax-Pre 5.68 L/sec    FEFMax-%Pred-Pre 119 %    FEF2575-Pred 1.44 L/sec    FEF2575-Pre 1.33 L/sec    PXU0206-%Pred-Pre 91 %    ExpTime-Pre 7.16 sec    FIFMax-Pre 2.90 L/sec    VC-Pred 2.98 L    VC-Pre 1.95 L    VC-%Pred-Pre 65 %    IC-Pred 2.52 L    IC-Pre 1.61 L    IC-%Pred-Pre 63 %    ERV-Pred 0.45 L    ERV-Pre 0.34 L    ERV-%Pred-Pre 75 %    FEV1FEV6-Pred 77 %    FEV1FEV6-Pre 79 %    DLCOunc-Pred 18.82 ml/min/mmHg    DLCOunc-Pre 17.79 ml/min/mmHg    DLCOunc-%Pred-Pre 94 %    DLCOcor-Pre 18.13 ml/min/mmHg    DLCOcor-%Pred-Pre 96 %    VA-Pre 3.64 L    VA-%Pred-Pre 78 %    FEV1SVC-Pred 61 %    FEV1SVC-Pre 79 %       I reviewed pulmonary function test that was performed today.  This shows normal spirometry and diffusing capacity, and lung function is stable and overall improved compared to 2016.    I reviewed results with the patient.      Assessment and plan:  Sister Betty is an  83-year-old who has Sjogren's follicular bronchiolitis and history of ILD who is here for follow-up.   1.  Follicular bronchiolitis with Sjogren's.  Follow-up chest CT scan in 2018 showed no ILD.  She continues to have follicular bronchiolitis.  PFT is normal and stable and overall improved compared to 2016.  We will continue anti-inflammatories for her Sjogrens follicular bronchiolitis including prednisone 5 mg daily, azithromycin 250 mg daily, montelukast daily and Breo Ellipta 100- 25.  She will return in 6 months months with full PFT.  She is unable to do any physical exercise currently because of her hip pain.  She is scheduled for right hip surgery on May 31.  2.  Healthcare maintenance.  She qualifies for another shot of the bivalent COVID-19 booster.  I recommended that she ask her surgeon if it is okay for her to get that before her surgery.  I spent 42 minutes reviewing chart, talking with and examining patient, reviewing test results, formulating plan and documentation on the day of the encounter (excluding PFT review).        Maryjane Tillman MD

## 2023-05-11 NOTE — TELEPHONE ENCOUNTER
[facility] Call Center    Phone Message    May a detailed message be left on voicemail: yes     Reason for Call: Other: Dr. Layne Britton has recommended that Brianna get a boost covid shot before her surgery but also wanted Betty o get  opinion on it. Please call her to let her know. Thank you, Candace    Please call Nghia    Action Taken: Other: csc    Travel Screening: Not Applicable

## 2023-05-11 NOTE — NURSING NOTE
Chief Complaint   Patient presents with     Interstitial Lung Disease (ILD)     ILD Follow up     Vitals were taken and medications were reconciled.     Mary Alice SOTO  12:05 PM

## 2023-05-12 NOTE — TELEPHONE ENCOUNTER
Confirmed with Nghia that she had received the message that it was ok for Sister Sita to take an additional 10 mg of torsemide as needed for swelling and weight gain prior to being seen in [facility].     Date: 5/12/2023    Time of Call: 9:32 AM     Diagnosis:  Heart failure     [ VORB ] Ordering provider: Dr Rosa    Order: ok to take an additional 10 mg of torsemide in the afternoon as needed for weight gain or swelling      Order received by: Flor Velasquez RN       Follow-up/additional notes: Nghia stated understanding and informed me that Sister Sita has also lost an additional 2 lbs.

## 2023-05-15 PROBLEM — D50.9 IRON DEFICIENCY ANEMIA, UNSPECIFIED IRON DEFICIENCY ANEMIA TYPE: Status: ACTIVE | Noted: 2022-11-30

## 2023-05-15 PROBLEM — N18.31 STAGE 3A CHRONIC KIDNEY DISEASE (H): Status: ACTIVE | Noted: 2019-09-03

## 2023-05-15 PROBLEM — Z79.01 LONG TERM CURRENT USE OF ANTICOAGULANT THERAPY: Status: RESOLVED | Noted: 2017-10-05 | Resolved: 2023-05-15

## 2023-05-15 PROBLEM — B00.1 RECURRENT COLD SORES: Status: ACTIVE | Noted: 2023-05-15

## 2023-05-15 LAB
DLCOCOR-%PRED-PRE: 96 %
DLCOCOR-PRE: 18.13 ML/MIN/MMHG
DLCOUNC-%PRED-PRE: 94 %
DLCOUNC-PRE: 17.79 ML/MIN/MMHG
DLCOUNC-PRED: 18.82 ML/MIN/MMHG
ERV-%PRED-PRE: 75 %
ERV-PRE: 0.34 L
ERV-PRED: 0.45 L
EXPTIME-PRE: 7.16 SEC
FEF2575-%PRED-PRE: 91 %
FEF2575-PRE: 1.33 L/SEC
FEF2575-PRED: 1.44 L/SEC
FEFMAX-%PRED-PRE: 119 %
FEFMAX-PRE: 5.68 L/SEC
FEFMAX-PRED: 4.73 L/SEC
FEV1-%PRED-PRE: 84 %
FEV1-PRE: 1.54 L
FEV1FEV6-PRE: 79 %
FEV1FEV6-PRED: 77 %
FEV1FVC-PRE: 79 %
FEV1FVC-PRED: 77 %
FEV1SVC-PRE: 79 %
FEV1SVC-PRED: 61 %
FIFMAX-PRE: 2.9 L/SEC
FVC-%PRED-PRE: 82 %
FVC-PRE: 1.96 L
FVC-PRED: 2.39 L
IC-%PRED-PRE: 63 %
IC-PRE: 1.61 L
IC-PRED: 2.52 L
VA-%PRED-PRE: 78 %
VA-PRE: 3.64 L
VC-%PRED-PRE: 65 %
VC-PRE: 1.95 L
VC-PRED: 2.98 L

## 2023-05-15 NOTE — TELEPHONE ENCOUNTER
Called and talked with Nghia and let her know that it was ok to get this injection as recommended b her PCP.  Nghia expressed understanding.   Julia Carrasco RN

## 2023-05-15 NOTE — RESULT ENCOUNTER NOTE
-Your CMP (which includes electrolyte levels, blood sugar levels, and kidney and liver function tests) looks stable.  -Your CBC labs (which includes blood labs looking for signs of infection or anemia) looks normal, and your hemoglobin remains in a good range.    Please let me know if you have any questions.  Best,   Lev Tristan MD

## 2023-05-16 DIAGNOSIS — J44.89 FOLLICULAR BRONCHIOLITIS (H): ICD-10-CM

## 2023-05-17 RX ORDER — FLUTICASONE FUROATE AND VILANTEROL TRIFENATATE 100; 25 UG/1; UG/1
POWDER RESPIRATORY (INHALATION)
Qty: 1 EACH | Refills: 11 | Status: SHIPPED | OUTPATIENT
Start: 2023-05-17

## 2023-05-18 NOTE — PROGRESS NOTES
Ortho Navigator Note      Pre-op Date 5/10/23     Medical Clearance  Cleared by PCP.   Cleared by cardiology 5/24     Labs WNR     COVID Test Date No longer indicated      Skin  Intact      Activity: Ambulates independently with FW walker      Equipment Need Patient will bring a walker for discharge. Defer to PT/OT for recs.       Meds to Hold -Held all supplements 14 days prior to surgery  -Hold ASA 7 days prior to surgery . This has been cleared per cardiology  -Short acting insulin (e.g. regular, lispro, aspart): HOLD on the morning of surgery (pt has been taking infrequently, only if glucose levels >150).   -Hold Ozempic 7 days prior to surgery   -Hold torsemide DOS   * Medication recommendations are not intended to be exhaustive; they are limited to common medications that are potentially dangerous if incorrectly managed   NPO Instructions  Defer to PAN RN     Pre-op Joint Education Complete? Complete   Discharge Plan I spoke with the Health Care Agent and friend (Nghia). She described the paln as coordinated with Dr. Shannon's RNCC. Patient has plan to discharge to TCU when stable to discharge. 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 The home is too small for a 3rd person to stay to help care for Sister Randal and they have requested a TCU.  The Sister's (nuns) have affiliation with [facility]. They have been contacted and are in the process of completing the pre-admission process. They will be private paying for the stay as needed.   When she is cleared for discharge, she will call Nghia her  friend/Sister(nun) and healthcare agent to arrange for transportation home. They will then transport patient to TCU. In the event that she cannot get into car, she may require alternate transportation to TCU.    /Transportation Roommate/Sister (robert) will be  after discharge from TCU.  is physically able to care for patient.      Barriers to Discharge No barriers to discharge.       Additional Info/   Special Needs : Patient had no unanswered questions or concerns.          05/18/23 2282   Discharge Planning   Patient/Family Anticipates Transition to inpatient rehabilitation facility   Barriers to Discharge no caregiver available after discharge;other (see comments)   Concerns to be Addressed home safety   Living Arrangements   People in Home other (see comments)   Type of Residence Private Residence   Is your private residence a single family home or apartment? Single family home   Number of Stairs, Within Home, Primary three   Stair Railings, Within Home, Primary railings safe and in good condition   Once home, are you able to live on one level? Yes   Bathroom Shower/Tub Tub/Shower unit   Equipment Currently Used at Home walker, standard   Support System   Support Systems Family Members   Do you have someone available to stay with you one or two nights once you are home? Yes   Medical Clearance   Date of Physical 05/10/23   Clinic Name [facility]   It is recommended that you call and check with any specialty providers before surgery to see if you need surgical clearance.  Do you see any specialty providers outside of your primary care provider? No   Blood   Known Bleeding Disorder or Coagulopathy No  (HX of DVT R-Leg 5 years ago.)   Does the patient have any Pentecostal/cultural preferences related to blood products? No   Education   Has the patient scheduled or completed pre-op total joint education, either in class or online, in the last 12 months? Yes   Patient attended total joint pre-op class/received pre-op teaching  online

## 2023-05-19 ENCOUNTER — TELEPHONE (OUTPATIENT)
Facility: CLINIC | Age: 83
End: 2023-05-19
Payer: MEDICARE

## 2023-05-19 DIAGNOSIS — I50.32 CHRONIC HEART FAILURE WITH PRESERVED EJECTION FRACTION (H): Primary | ICD-10-CM

## 2023-05-19 NOTE — TELEPHONE ENCOUNTER
Prior Authorization Retail Medication Request    Medication/Dose: Fluticasone-Vilanterol 100-25MCG  ICD code (if different than what is on RX):    Previously Tried and Failed:    Rationale:      Insurance Name:    Insurance ID:        Pharmacy Information (if different than what is on RX)  Name:    Phone:

## 2023-05-20 DIAGNOSIS — Z79.52 CURRENT CHRONIC USE OF SYSTEMIC STEROIDS: ICD-10-CM

## 2023-05-20 DIAGNOSIS — M85.80 OSTEOPENIA, UNSPECIFIED LOCATION: ICD-10-CM

## 2023-05-23 NOTE — TELEPHONE ENCOUNTER
Prior Authorization Not Needed per Insurance    Medication: BREO ELLIPTA 100-25 MCG/ACT IN AEPB  Insurance Company: [employer] - Phone [phone] Fax [phone]  Expected CoPay:      Pharmacy Filling the Rx: [id number] - [place], [place] - [address]  Pharmacy Notified: Yes  Patient Notified: Yes **Instructed pharmacy to notify patient when script is ready to /ship.**

## 2023-05-24 ENCOUNTER — APPOINTMENT (OUTPATIENT)
Dept: CARDIOLOGY | Facility: CLINIC | Age: 83
End: 2023-05-24
Attending: INTERNAL MEDICINE
Payer: MEDICARE

## 2023-05-24 ENCOUNTER — OFFICE VISIT (OUTPATIENT)
Dept: CARDIOLOGY | Facility: CLINIC | Age: 83
End: 2023-05-24
Attending: NURSE PRACTITIONER
Payer: MEDICARE

## 2023-05-24 ENCOUNTER — LAB (OUTPATIENT)
Dept: LAB | Facility: CLINIC | Age: 83
End: 2023-05-24
Payer: MEDICARE

## 2023-05-24 VITALS
BODY MASS INDEX: 28.6 KG/M2 | OXYGEN SATURATION: 93 % | WEIGHT: 174.5 LBS | DIASTOLIC BLOOD PRESSURE: 56 MMHG | SYSTOLIC BLOOD PRESSURE: 127 MMHG | HEART RATE: 56 BPM

## 2023-05-24 DIAGNOSIS — I50.9: Primary | ICD-10-CM

## 2023-05-24 DIAGNOSIS — I48.0 PAROXYSMAL ATRIAL FIBRILLATION (H): ICD-10-CM

## 2023-05-24 DIAGNOSIS — Z01.810: Primary | ICD-10-CM

## 2023-05-24 DIAGNOSIS — I50.32 CHRONIC HEART FAILURE WITH PRESERVED EJECTION FRACTION (H): ICD-10-CM

## 2023-05-24 LAB
ANION GAP SERPL CALCULATED.3IONS-SCNC: 10 MMOL/L (ref 7–15)
BUN SERPL-MCNC: 20.3 MG/DL (ref 8–23)
CALCIUM SERPL-MCNC: 9.4 MG/DL (ref 8.8–10.2)
CHLORIDE SERPL-SCNC: 101 MMOL/L (ref 98–107)
CREAT SERPL-MCNC: 0.98 MG/DL (ref 0.51–0.95)
DEPRECATED HCO3 PLAS-SCNC: 27 MMOL/L (ref 22–29)
GFR SERPL CREATININE-BSD FRML MDRD: 57 ML/MIN/1.73M2
GLUCOSE SERPL-MCNC: 246 MG/DL (ref 70–99)
POTASSIUM SERPL-SCNC: 3.5 MMOL/L (ref 3.4–5.3)
SODIUM SERPL-SCNC: 138 MMOL/L (ref 136–145)

## 2023-05-24 PROCEDURE — 36415 COLL VENOUS BLD VENIPUNCTURE: CPT | Performed by: PATHOLOGY

## 2023-05-24 PROCEDURE — 80048 BASIC METABOLIC PNL TOTAL CA: CPT | Performed by: PATHOLOGY

## 2023-05-24 PROCEDURE — G0463 HOSPITAL OUTPT CLINIC VISIT: HCPCS | Performed by: NURSE PRACTITIONER

## 2023-05-24 PROCEDURE — 93010 ELECTROCARDIOGRAM REPORT: CPT | Performed by: INTERNAL MEDICINE

## 2023-05-24 PROCEDURE — 93005 ELECTROCARDIOGRAM TRACING: CPT | Mod: RTG

## 2023-05-24 PROCEDURE — 99214 OFFICE O/P EST MOD 30 MIN: CPT | Mod: 25 | Performed by: NURSE PRACTITIONER

## 2023-05-24 RX ORDER — ALENDRONATE SODIUM 70 MG/1
70 TABLET ORAL
Qty: 12 TABLET | Refills: 1 | Status: SHIPPED | OUTPATIENT
Start: 2023-05-24 | End: 2024-02-13

## 2023-05-24 ASSESSMENT — PAIN SCALES - GENERAL: PAINLEVEL: NO PAIN (0)

## 2023-05-24 NOTE — TELEPHONE ENCOUNTER
Medication/Dose: alendronate (FOSAMAX) 70 MG tablet    Last Written : 4/24/23  Last Quantity: 4, # refills: 1  Last Office Visit :  1/13/23  Pending appointment:     Jul 21, 2023  2:00 PM  (Arrive by 1:45 PM)  Return Visit with Zulma Lopez MD  [facility] [place] ([facility] )        Prescription did not meet protocol, set up, and routed to provider, as last DEXA was 8/20/19.    NORMA LopezN, RN  [facility] Refill Team

## 2023-05-24 NOTE — LETTER
5/24/2023      RE: Betty Tee  [address]  [facility] [address]       Dear Colleague,    Thank you for the opportunity to participate in the care of your patient, Betty Tee, at the [facility] [place] [facility]. Please see a copy of my visit note below.      [facility]   [facility]      HPI:   Ms. Tee is a 83 year old female with medical history pertinent for interstitial lung disease (moderate restriction), Sjogren's syndrome, HTN, hx GIB, atrial fibrillation s/p Watchman, diverticulitis s/p colectomy 2011, and HFpEF. She presents to [facility] for routine follow up.     With regards to HFpEF, echo demonstrates mild LA enlargement, increase LV filling pressures, and by labs she has had an increased nt-bnp. She has been on spironolactone and torsemide. Over the last several months she has had considerable weight loss (210 lbs now down to 170s). Had COVID in March '23 and lost a considerable amount of strength with this illness. She is requiring less diuretics. Spironolactone stopped and torsemide reduced to 20 mg BID. She is undergoing right hip replacement this month.      Today, Ms. Tee reports feeling well. No acute concerns. Eager for upcoming hip replacement next week. Did not tolerate reduction in torsemide to 20 mg BID. Called reporting episodes of rapid heart beat. Torsemide increased back to 40 mg in AM and continued 20 in PM. Feeling better on increased dose. Denies further episodes of palpitations.  She has only occasional lightheadedness, no syncope, no chest pain, no PND, no orthopnea, or lower extremity edema.       Zio placed today to further evaluate for AF versus other ectopy.       Cardiac Medications   - ASA 81 mg daily   - Atorvastatin 5 mg daily  - Metoprolol succinate 50 mg BID  - Torsemide 40/20 mg BID      PAST MEDICAL HISTORY:  Past Medical History:   Diagnosis Date    Alcohol abuse, in  remission     Allergic rhinitis, cause unspecified     allegra helps when she takes it    Antiplatelet or antithrombotic long-term use     Atrial fibrillation (H)     in hosp in 11/11 after surgery w/ fluid overload    Cardiomegaly     LVH on stress echo- cardiac w/u at [facility]- neg CT scan for PE, neg stress echo in 8/06    Chest pain, unspecified     Congestive heart failure (H)     Depressive disorder     Diabetes (H)     Disorder of bone and cartilage, unspecified     osteopenia (had been on prempro), improved on 6/06 dexa, stable dexa 11/10    Diverticulosis of colon (without mention of hemorrhage)     last episode yrs ago    Essential hypertension, benign     Follicular bronchiolitis (H)     associated with Sjogrens, dx by chest CT showing mosaic attenuation and air trapping    Gastro-oesophageal reflux disease     ILD (interstitial lung disease) (H)     associated with Sjogrens, also has mildly elevated IgG4, first noted on chest CT 2015 (mild changes) and also has small airways disease; ILD improved on follow up chest CT 2018.    Insomnia, unspecified     weaned off clonazepam    Irregular heart beat     Lumbago 07/2009    MRI with DJD, now seeing Dr. Cain for sciatic sx's    Major depressive disorder, recurrent episode, moderate (H)     Obstructive sleep apnea     uses cpap    Osteoarthrosis, unspecified whether generalized or localized, unspecified site     Sjogren's syndrome (H)     + RG and SSA and lip bx    Sleep apnea     uses a cpap machine    Tobacco use disorder     chantix in 9/07, started again in 6/08, working       FAMILY HISTORY:  Family History   Problem Relation Age of Onset    C.A.D. Mother 63        MI- first at age 63    Heart Disease Mother     Hypertension Mother     Cerebrovascular Disease Mother     Hyperlipidemia Mother     Coronary Artery Disease Mother         MI-first at age 63    Other - See Comments Mother         cerebrovascular disease     Alcohol/Drug Father     Alzheimer  Disease Father     Dementia Father     Hypertension Father     Hyperlipidemia Father     Substance Abuse Father     Diabetes Sister     Hypertension Sister     Hyperlipidemia Sister     Substance Abuse Sister     Asthma Sister     C.A.D. Sister 52        Minor MI- age 50's    Heart Disease Sister     Hypertension Sister     Hypertension Sister     Cancer - colorectal Sister 48        Late 40's early 50's    Gastrointestinal Disease Sister         Diverticulitis    Lipids Sister     Lipids Sister     Diabetes Sister     Heart Disease Sister         CHF    Cancer Sister         lung, smoker    Substance Abuse Sister     Asthma Sister     Cancer Sister     Coronary Artery Disease Sister         minor MI-age 50's    Heart Disease Sister     Hypertension Sister     Hypertension Sister     Colon Cancer Sister     Diverticulitis Sister     Lung Cancer Sister     Heart Disease Sister         CHF    Diabetes Sister     Asthma Sister     Hypertension Brother     Prostate Cancer Brother 74        Dx'd age 74    Gastrointestinal Disease Brother         Diverticulitis    Parkinsonism Brother     Substance Abuse Brother     Prostate Cancer Brother     Hyperlipidemia Brother     Hypertension Brother     Prostate Cancer Brother     Diverticulitis Brother     Parkinsonism Brother     Breast Cancer Daughter     Breast Cancer Daughter     Diabetes Other     Hypertension Other     Anesthesia Reaction No family hx of     Deep Vein Thrombosis (DVT) No family hx of        SOCIAL HISTORY:  Social History     Socioeconomic History    Marital status: Single    Number of children: 0    Years of education: Ed Spec De   Occupational History    Occupation: Professor     Employer: [employer]     Comment: [employer]- Education   Tobacco Use    Smoking status: Former     Packs/day: 0.50     Types: Cigarettes     Start date:      Quit date: 2011     Years since quittin.8    Smokeless tobacco: Never     Tobacco comments:     1/2 ppd   Vaping Use    Vaping status: Never Used   Substance and Sexual Activity    Alcohol use: No     Alcohol/week: 0.0 standard drinks of alcohol     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    Drug use: No    Sexual activity: Never   Other Topics Concern    Parent/sibling w/ CABG, MI or angioplasty before 65F 55M? Yes   Social History Narrative                   CURRENT MEDICATIONS:  ACCU-CHEK SHANNON PLUS test strip, USE TO TEST BLOOD SUGARS 3 TIMES DAILY  acetaminophen (TYLENOL) 500 MG tablet, Take 1,000 mg by mouth every 8 hours as needed (max 6 tablets/24 hours, 2 tablets/dose)  acyclovir (ZOVIRAX) 400 MG tablet, Take 1 tablet (400 mg) by mouth every 8 hours For a couple days  acyclovir (ZOVIRAX) 5 % external ointment, Apply topically as needed (6 times day PRN for outbreaks) As needed for outbreaks  albuterol (PROAIR HFA/PROVENTIL HFA/VENTOLIN HFA) 108 (90 Base) MCG/ACT inhaler, Inhale 2 puffs into the lungs every 6 hours  alendronate (FOSAMAX) 70 MG tablet, Take 1 tablet (70 mg) by mouth every 7 days  allopurinol (ZYLOPRIM) 100 MG tablet, Take 1 tablet (100 mg) by mouth daily  anakinra (KINERET) 100 MG/0.67ML SOSY injection, 100 mg every day x 3 days as needed for flare ups  aspirin (ASA) 81 MG EC tablet, Take 1 tablet (81 mg) by mouth daily  atorvastatin (LIPITOR) 10 MG tablet, TAKE 1/2 TABLET BY MOUTH EVERY DAY  azithromycin (ZITHROMAX) 250 MG tablet, TAKE 1 TABLET BY MOUTH EVERY DAY  BD PEN NEEDLE JANE 2ND GEN 32G X 4 MM miscellaneous, USE TO TEST 4 TIMES A DAY  blood glucose (NO BRAND SPECIFIED) lancets standard, Use to test blood sugar 3 times daily or as directed  BREO ELLIPTA 100-25 MCG/ACT inhaler, TAKE 1 PUFF BY MOUTH EVERY DAY  cevimeline (EVOXAC) 30 MG capsule, Take 1 capsule (30 mg) by mouth 3 times daily (Patient taking differently: Take 30 mg by mouth 3 times daily as needed)  Cholecalciferol (VITAMIN D3) 50 MCG (2000 UT) CAPS, TAKE 100 MCG BY MOUTH DAILY (TAKE 2 TABLET (50  MCG) BY MOUTH DAILY - ORAL)  COMPOUNDED NON-CONTROLLED SUBSTANCE (CMPD RX) - PHARMACY TO MIX COMPOUNDED MEDICATION, Estriol 1 mg/g Apply small amount to finger and apply to inside vagina daily for 2 weeks then twice weekly Route: vaginally Dispense 30 grams 11 refills  cyanocobalamin (VITAMIN B-12) 1000 MCG tablet, Take 1 tablet (1,000 mcg) by mouth daily (Patient taking differently: Take 1,000 mcg by mouth three times a week)  escitalopram (LEXAPRO) 20 MG tablet, TAKE 1 TABLET BY MOUTH EVERY DAY  ferrous gluconate (FERGON) 324 (38 Fe) MG tablet, Take 1 tablet (324 mg) by mouth daily (with breakfast)  fluticasone (FLONASE) 50 MCG/ACT nasal spray, SPRAY 1-2 SPRAYS INTO BOTH NOSTRILS DAILY AS NEEDED FOR ALLERGIES  hydroxychloroquine (PLAQUENIL) 200 MG tablet, Take 2 tablets (400 mg) by mouth daily Get annual eye exams for hydroxychloroquine (Plaquenil) monitoring and fax to [phone]  insulin lispro (HUMALOG KWIKPEN) 100 UNIT/ML (1 unit dial) KWIKPEN, Inject 8 Units Subcutaneous 3 times daily (before meals)  ketoconazole (NIZORAL) 2 % external cream, Apply topically 2 times daily as needed for itching  KLOR-CON 20 MEQ CR tablet, TAKE 2 TABLETS (40 MEQ) BY MOUTH EVERY MORNING AND 1 TABLET (20 MEQ) EVERY EVENING.  lidocaine (LIDODERM) 5 % patch, APPLY PATCH TO PAINFUL AREA FOR UP TO 12 H WITHIN A 24 H PERIOD. REMOVE AFTER 12 HOURS. (Patient taking differently: Apply patch to painful area for up to 12 h within a 24 h period.  Remove after 12 hours.)  loratadine (CLARITIN) 10 MG tablet, TAKE 1 TABLET BY MOUTH EVERY DAY  methocarbamol (ROBAXIN) 750 MG tablet, Take 1 tablet (750 mg) by mouth 3 times daily as needed for muscle spasms  metoprolol succinate ER (TOPROL XL) 50 MG 24 hr tablet, Take 1 tablet (50 mg) by mouth 2 times daily  montelukast (SINGULAIR) 10 MG tablet, TAKE 1 TABLET BY MOUTH EVERYDAY AT BEDTIME  oxyCODONE-acetaminophen (PERCOCET) 7.5-325 MG per tablet, Take 1 tablet by mouth 2 times daily as needed for  severe pain With at least 4 hours between doses. Ok to fill and start 4/19/23  OZEMPIC, 1 MG/DOSE, 4 MG/3ML pen, INJECT 1 MG SUBCUTANEOUS ONCE A WEEK  pantoprazole (PROTONIX) 40 MG EC tablet, Take 1 tablet (40 mg) by mouth daily (replaces famotidine- should stop taking famotidine)  predniSONE (DELTASONE) 5 MG tablet, Take 1 tablet (5 mg) by mouth daily  torsemide (DEMADEX) 20 MG tablet, Take 2 tablets (40 mg) by mouth every morning AND 1 tablet (20 mg) every evening.  traZODone (DESYREL) 50 MG tablet, TAKE 3 TABLETS (150 MG) BY MOUTH NIGHTLY AS NEEDED FOR SLEEP    No current facility-administered medications on file prior to visit.      ROS:   Refer to HPI    EXAM:  /56 (BP Location: Right arm, Patient Position: Chair, Cuff Size: Adult Large)   Pulse 56   Wt 79.2 kg (174 lb 8 oz)   SpO2 93%   BMI 28.60 kg/m     GENERAL: Appears comfortable, in no acute distress.   HEENT: Eye symmetrical, no discharge or icterus bilaterally. Mucous membranes moist and without lesions.  CV: RRR, +S1S2, no murmur, rub, or gallop. JVP < 6 cm.   RESPIRATORY: Respirations regular, even, and unlabored. Lungs CTA throughout.   GI: Soft and non distended with normoactive bowel sounds present in all quadrants. No tenderness, rebound, guarding. No hepatomegaly.   EXTREMITIES: no peripheral edema. 2+ bilateral pedal pulses.   NEUROLOGIC: Alert and oriented x 3. No focal deficits.   MUSCULOSKELETAL: No joint swelling or tenderness.   SKIN: No jaundice. No rashes or lesions.     Labs, reviewed with patient in clinic today:  CBC RESULTS:  Lab Results   Component Value Date    WBC 7.8 05/10/2023    WBC 8.6 06/04/2021    RBC 4.41 05/10/2023    RBC 4.46 06/04/2021    HGB 12.8 05/10/2023    HGB 13.4 06/04/2021    HCT 40.3 05/10/2023    HCT 42.1 06/04/2021    MCV 91 05/10/2023    MCV 94 06/04/2021    MCH 29.0 05/10/2023    MCH 30.0 06/04/2021    MCHC 31.8 05/10/2023    MCHC 31.8 06/04/2021    RDW 15.3 (H) 05/10/2023    RDW 15.5 (H) 06/04/2021      05/10/2023     06/04/2021       CMP RESULTS:  Lab Results   Component Value Date     05/10/2023     06/04/2021    POTASSIUM 4.0 05/10/2023    POTASSIUM 4.1 08/23/2022    POTASSIUM 4.0 06/04/2021    CHLORIDE 102 05/10/2023    CHLORIDE 107 08/23/2022    CHLORIDE 106 06/04/2021    CO2 27 05/10/2023    CO2 22 08/23/2022    CO2 25 06/04/2021    ANIONGAP 12 05/10/2023    ANIONGAP 8 08/23/2022    ANIONGAP 6 06/04/2021     (H) 05/10/2023     (H) 03/09/2023     (H) 08/23/2022     (H) 06/04/2021    BUN 18.8 05/10/2023    BUN 17 08/23/2022    BUN 14 06/04/2021    CR 0.95 05/10/2023    CR 1.11 (H) 06/04/2021    GFRESTIMATED 59 (L) 05/10/2023    GFRESTIMATED 47 (L) 06/04/2021    GFRESTBLACK 54 (L) 06/04/2021    CLAUDY 9.5 05/10/2023    CLAUDY 8.8 06/04/2021    BILITOTAL 0.4 05/10/2023    BILITOTAL 0.6 12/04/2020    ALBUMIN 4.1 05/10/2023    ALBUMIN 3.2 (L) 08/23/2022    ALBUMIN 3.5 06/04/2021    ALKPHOS 85 05/10/2023    ALKPHOS 95 12/04/2020    ALT 13 05/10/2023    ALT 25 06/04/2021    AST 21 05/10/2023    AST 17 06/04/2021        INR RESULTS:  Lab Results   Component Value Date    INR 1.43 (H) 11/10/2022    INR 1.36 (H) 03/03/2020       Lab Results   Component Value Date    MAG 1.8 03/14/2023    MAG 2.0 04/11/2017     Lab Results   Component Value Date    NTBNPI 3,531 (H) 04/06/2017     Lab Results   Component Value Date    NTBNP 1,004 04/13/2023    NTBNP 176 08/27/2020       Cardiac Diagnostics:    9/9/2021 Echo  Interpretation Summary  Global and regional left ventricular function is normal with an EF of 55-60%.  The right ventricle is normal size. Global right ventricular function is  normal.  No significant valvular abnormalities.  IVC diameter >2.1 cm collapsing <50% with sniff suggests a high RA pressure  estimated at 15 mmHg or greater.  This study was compared with the study from 08/27/2020. The LA filling  pressures are higher. There are no significant changes in the  biventricular  function or aortic calibers.        Assessment and Plan:   Ms. Tee is a 83 year old female with medical history pertinent for interstitial lung disease (moderate restriction), Sjogren's syndrome, HTN, atrial fibrillation s/p Watchman, diverticulitis s/p colectomy 2011, and HFpEF. She presents to [facility] for routine follow up.     Appearing and feeling well today. No acute concerns. Appears euvolemic by exam. Blood pressure well controlled. Review of BMP with stable renal function and normal lytes. I do not recommend any medication changes today. Following hip replacement we will see back in follow up and can consider resuming low dose spironolactone and or adding an SGLT2 at that time. ECG in clinic demonstrates sinus bradycardia at 52 bpm with first degree AVB,  ms. Hold ASA starting today for upcoming surgery. Recommend following up 6-8 weeks.     # Chronic diastolic heart failure/HFpEF (EF 55-60%)  Risk factors include female gender, age, HTN, diabetes, sleep apnea, obesity and arrhythmia. The mainstays of therapy for HFpEF include volume management, blood pressure control, treating underlying sleep apnea if present, treatment of underlying CAD if within the goals of care, weight management, exercise training, rate control for atrial fibrillation as well as consideration for a rhythm control strategy, and consideration for clinical trials. Consideration of spironolactone in low risk patients should be taken given the positive HF results in the TOPCAT trial.  Stage C. NYHA Class II.    Primary Cardiologist: Dr. Rosa; Last seen 4/2023  BP: controlled   Fluid status: Euvolemic  Aldosterone antagonist: holding for hypotension  SGLT2i: may consider starting following hip replacement surgery   Ischemia evaluation: Complete NM stress negative (6/2014)  ACEi/ARB/ARNI: n/a, no evidence for use in HFpEF  BB: n/a, no evidence for use in HFpEF, BB for AF  SCD prophylaxis: n/a, no evidence for use in  HFpEF  NSAID use: contraindicated  Sleep apnea: CPAP compliant     # Paroxysmal Atrial fibrillation, s/p Watchman 9/2022  [id number] (age >75, HTN, CHF, gender)   - continue BB and ASA 81 mg daily    # Primary prevention  - Continue statin and ASA     # Pre-op Cardiac Clearance  See Dr. Rosa's note 4/13/23  Hip surgery stratification; she is presently euvolemic on exam, her ejection fraction is normal-given her history of atrial fibrillation she is at moderate risk for postop atrial fibrillation and I think low risk for perioperative MI.  She can hold aspirin 1 week prior and resume after bleeding is controlled post OR.  Overall she is moderate risk for atrial fibrillation but overall low risk for hypotension and pulmonary edema.         Follow up:  - CORE in 6-8 weeks   - Dr. Rosa with echo 9/2023        Lisbet Joseph DNP, NP-C  Advance Heart Failure  05/24/23              Please do not hesitate to contact me if you have any questions/concerns.     Sincerely,     LIZY Gutierres CNP

## 2023-05-24 NOTE — PROGRESS NOTES
[facility] Cardiology   [facility]      HPI:   Ms. Tee is a 83 year old female with medical history pertinent for interstitial lung disease (moderate restriction), Sjogren's syndrome, HTN, hx GIB, atrial fibrillation s/p Watchman, diverticulitis s/p colectomy 2011, and HFpEF. She presents to [facility] for routine follow up.     With regards to HFpEF, echo demonstrates mild LA enlargement, increase LV filling pressures, and by labs she has had an increased nt-bnp. She has been on spironolactone and torsemide. Over the last several months she has had considerable weight loss (210 lbs now down to 170s). Had COVID in March '23 and lost a considerable amount of strength with this illness. She is requiring less diuretics. Spironolactone stopped and torsemide reduced to 20 mg BID. She is undergoing right hip replacement this month.      Today, Ms. Tee reports feeling well. No acute concerns. Eager for upcoming hip replacement next week. Did not tolerate reduction in torsemide to 20 mg BID. Called reporting episodes of rapid heart beat. Torsemide increased back to 40 mg in AM and continued 20 in PM. Feeling better on increased dose. Denies further episodes of palpitations.  She has only occasional lightheadedness, no syncope, no chest pain, no PND, no orthopnea, or lower extremity edema.       Zio placed today to further evaluate for AF versus other ectopy.       Cardiac Medications   - ASA 81 mg daily   - Atorvastatin 5 mg daily  - Metoprolol succinate 50 mg BID  - Torsemide 40/20 mg BID      PAST MEDICAL HISTORY:  Past Medical History:   Diagnosis Date     Alcohol abuse, in remission      Allergic rhinitis, cause unspecified     allegra helps when she takes it     Antiplatelet or antithrombotic long-term use      Atrial fibrillation (H)     in hosp in 11/11 after surgery w/ fluid overload     Cardiomegaly     LVH on stress echo- cardiac w/u at [facility] ER- neg CT scan for PE, neg stress echo in 8/06     Chest pain,  unspecified      Congestive heart failure (H)      Depressive disorder      Diabetes (H)      Disorder of bone and cartilage, unspecified     osteopenia (had been on prempro), improved on 6/06 dexa, stable dexa 11/10     Diverticulosis of colon (without mention of hemorrhage)     last episode yrs ago     Essential hypertension, benign      Follicular bronchiolitis (H)     associated with Sjogrens, dx by chest CT showing mosaic attenuation and air trapping     Gastro-oesophageal reflux disease      ILD (interstitial lung disease) (H)     associated with Sjogrens, also has mildly elevated IgG4, first noted on chest CT 2015 (mild changes) and also has small airways disease; ILD improved on follow up chest CT 2018.     Insomnia, unspecified     weaned off clonazepam     Irregular heart beat      Lumbago 07/2009    MRI with DJD, now seeing Dr. Cain for sciatic sx's     Major depressive disorder, recurrent episode, moderate (H)      Obstructive sleep apnea     uses cpap     Osteoarthrosis, unspecified whether generalized or localized, unspecified site      Sjogren's syndrome (H)     + RG and SSA and lip bx     Sleep apnea     uses a cpap machine     Tobacco use disorder     chantix in 9/07, started again in 6/08, working       FAMILY HISTORY:  Family History   Problem Relation Age of Onset     C.A.D. Mother 63        MI- first at age 63     Heart Disease Mother      Hypertension Mother      Cerebrovascular Disease Mother      Hyperlipidemia Mother      Coronary Artery Disease Mother         MI-first at age 63     Other - See Comments Mother         cerebrovascular disease      Alcohol/Drug Father      Alzheimer Disease Father      Dementia Father      Hypertension Father      Hyperlipidemia Father      Substance Abuse Father      Diabetes Sister      Hypertension Sister      Hyperlipidemia Sister      Substance Abuse Sister      Asthma Sister      C.A.D. Sister 52        Minor MI- age 50's     Heart Disease Sister       Hypertension Sister      Hypertension Sister      Cancer - colorectal Sister 48        Late 40's early 50's     Gastrointestinal Disease Sister         Diverticulitis     Lipids Sister      Lipids Sister      Diabetes Sister      Heart Disease Sister         CHF     Cancer Sister         lung, smoker     Substance Abuse Sister      Asthma Sister      Cancer Sister      Coronary Artery Disease Sister         minor MI-age 50's     Heart Disease Sister      Hypertension Sister      Hypertension Sister      Colon Cancer Sister      Diverticulitis Sister      Lung Cancer Sister      Heart Disease Sister         CHF     Diabetes Sister      Asthma Sister      Hypertension Brother      Prostate Cancer Brother 74        Dx'd age 74     Gastrointestinal Disease Brother         Diverticulitis     Parkinsonism Brother      Substance Abuse Brother      Prostate Cancer Brother      Hyperlipidemia Brother      Hypertension Brother      Prostate Cancer Brother      Diverticulitis Brother      Parkinsonism Brother      Breast Cancer Daughter      Breast Cancer Daughter      Diabetes Other      Hypertension Other      Anesthesia Reaction No family hx of      Deep Vein Thrombosis (DVT) No family hx of        SOCIAL HISTORY:  Social History     Socioeconomic History     Marital status: Single     Number of children: 0     Years of education: Ed Spec De   Occupational History     Occupation: Professor     Employer: [employer]     Comment: [employer]   Tobacco Use     Smoking status: Former     Packs/day: 0.50     Types: Cigarettes     Start date:      Quit date: 2011     Years since quittin.8     Smokeless tobacco: Never     Tobacco comments:     1/2 ppd   Vaping Use     Vaping status: Never Used   Substance and Sexual Activity     Alcohol use: No     Alcohol/week: 0.0 standard drinks of alcohol     Comment: In recovery beginning      Drug use: No     Sexual activity:  Never   Other Topics Concern     Parent/sibling w/ CABG, MI or angioplasty before 65F 55M? Yes   Social History Narrative                   CURRENT MEDICATIONS:  ACCU-CHEK SHANNON PLUS test strip, USE TO TEST BLOOD SUGARS 3 TIMES DAILY  acetaminophen (TYLENOL) 500 MG tablet, Take 1,000 mg by mouth every 8 hours as needed (max 6 tablets/24 hours, 2 tablets/dose)  acyclovir (ZOVIRAX) 400 MG tablet, Take 1 tablet (400 mg) by mouth every 8 hours For a couple days  acyclovir (ZOVIRAX) 5 % external ointment, Apply topically as needed (6 times day PRN for outbreaks) As needed for outbreaks  albuterol (PROAIR HFA/PROVENTIL HFA/VENTOLIN HFA) 108 (90 Base) MCG/ACT inhaler, Inhale 2 puffs into the lungs every 6 hours  alendronate (FOSAMAX) 70 MG tablet, Take 1 tablet (70 mg) by mouth every 7 days  allopurinol (ZYLOPRIM) 100 MG tablet, Take 1 tablet (100 mg) by mouth daily  anakinra (KINERET) 100 MG/0.67ML SOSY injection, 100 mg every day x 3 days as needed for flare ups  aspirin (ASA) 81 MG EC tablet, Take 1 tablet (81 mg) by mouth daily  atorvastatin (LIPITOR) 10 MG tablet, TAKE 1/2 TABLET BY MOUTH EVERY DAY  azithromycin (ZITHROMAX) 250 MG tablet, TAKE 1 TABLET BY MOUTH EVERY DAY  BD PEN NEEDLE JANE 2ND GEN 32G X 4 MM miscellaneous, USE TO TEST 4 TIMES A DAY  blood glucose (NO BRAND SPECIFIED) lancets standard, Use to test blood sugar 3 times daily or as directed  BREO ELLIPTA 100-25 MCG/ACT inhaler, TAKE 1 PUFF BY MOUTH EVERY DAY  cevimeline (EVOXAC) 30 MG capsule, Take 1 capsule (30 mg) by mouth 3 times daily (Patient taking differently: Take 30 mg by mouth 3 times daily as needed)  Cholecalciferol (VITAMIN D3) 50 MCG (2000 UT) CAPS, TAKE 100 MCG BY MOUTH DAILY (TAKE 2 TABLET (50 MCG) BY MOUTH DAILY - ORAL)  COMPOUNDED NON-CONTROLLED SUBSTANCE (CMPD RX) - PHARMACY TO MIX COMPOUNDED MEDICATION, Estriol 1 mg/g Apply small amount to finger and apply to inside vagina daily for 2 weeks then twice weekly Route: vaginally Dispense  30 grams 11 refills  cyanocobalamin (VITAMIN B-12) 1000 MCG tablet, Take 1 tablet (1,000 mcg) by mouth daily (Patient taking differently: Take 1,000 mcg by mouth three times a week)  escitalopram (LEXAPRO) 20 MG tablet, TAKE 1 TABLET BY MOUTH EVERY DAY  ferrous gluconate (FERGON) 324 (38 Fe) MG tablet, Take 1 tablet (324 mg) by mouth daily (with breakfast)  fluticasone (FLONASE) 50 MCG/ACT nasal spray, SPRAY 1-2 SPRAYS INTO BOTH NOSTRILS DAILY AS NEEDED FOR ALLERGIES  hydroxychloroquine (PLAQUENIL) 200 MG tablet, Take 2 tablets (400 mg) by mouth daily Get annual eye exams for hydroxychloroquine (Plaquenil) monitoring and fax to [phone]  insulin lispro (HUMALOG KWIKPEN) 100 UNIT/ML (1 unit dial) KWIKPEN, Inject 8 Units Subcutaneous 3 times daily (before meals)  ketoconazole (NIZORAL) 2 % external cream, Apply topically 2 times daily as needed for itching  KLOR-CON 20 MEQ CR tablet, TAKE 2 TABLETS (40 MEQ) BY MOUTH EVERY MORNING AND 1 TABLET (20 MEQ) EVERY EVENING.  lidocaine (LIDODERM) 5 % patch, APPLY PATCH TO PAINFUL AREA FOR UP TO 12 H WITHIN A 24 H PERIOD. REMOVE AFTER 12 HOURS. (Patient taking differently: Apply patch to painful area for up to 12 h within a 24 h period.  Remove after 12 hours.)  loratadine (CLARITIN) 10 MG tablet, TAKE 1 TABLET BY MOUTH EVERY DAY  methocarbamol (ROBAXIN) 750 MG tablet, Take 1 tablet (750 mg) by mouth 3 times daily as needed for muscle spasms  metoprolol succinate ER (TOPROL XL) 50 MG 24 hr tablet, Take 1 tablet (50 mg) by mouth 2 times daily  montelukast (SINGULAIR) 10 MG tablet, TAKE 1 TABLET BY MOUTH EVERYDAY AT BEDTIME  oxyCODONE-acetaminophen (PERCOCET) 7.5-325 MG per tablet, Take 1 tablet by mouth 2 times daily as needed for severe pain With at least 4 hours between doses. Ok to fill and start 4/19/23  OZEMPIC, 1 MG/DOSE, 4 MG/3ML pen, INJECT 1 MG SUBCUTANEOUS ONCE A WEEK  pantoprazole (PROTONIX) 40 MG EC tablet, Take 1 tablet (40 mg) by mouth daily (replaces  famotidine- should stop taking famotidine)  predniSONE (DELTASONE) 5 MG tablet, Take 1 tablet (5 mg) by mouth daily  torsemide (DEMADEX) 20 MG tablet, Take 2 tablets (40 mg) by mouth every morning AND 1 tablet (20 mg) every evening.  traZODone (DESYREL) 50 MG tablet, TAKE 3 TABLETS (150 MG) BY MOUTH NIGHTLY AS NEEDED FOR SLEEP    No current facility-administered medications on file prior to visit.      ROS:   Refer to HPI    EXAM:  /56 (BP Location: Right arm, Patient Position: Chair, Cuff Size: Adult Large)   Pulse 56   Wt 79.2 kg (174 lb 8 oz)   SpO2 93%   BMI 28.60 kg/m     GENERAL: Appears comfortable, in no acute distress.   HEENT: Eye symmetrical, no discharge or icterus bilaterally. Mucous membranes moist and without lesions.  CV: RRR, +S1S2, no murmur, rub, or gallop. JVP < 6 cm.   RESPIRATORY: Respirations regular, even, and unlabored. Lungs CTA throughout.   GI: Soft and non distended with normoactive bowel sounds present in all quadrants. No tenderness, rebound, guarding. No hepatomegaly.   EXTREMITIES: no peripheral edema. 2+ bilateral pedal pulses.   NEUROLOGIC: Alert and oriented x 3. No focal deficits.   MUSCULOSKELETAL: No joint swelling or tenderness.   SKIN: No jaundice. No rashes or lesions.     Labs, reviewed with patient in clinic today:  CBC RESULTS:  Lab Results   Component Value Date    WBC 7.8 05/10/2023    WBC 8.6 06/04/2021    RBC 4.41 05/10/2023    RBC 4.46 06/04/2021    HGB 12.8 05/10/2023    HGB 13.4 06/04/2021    HCT 40.3 05/10/2023    HCT 42.1 06/04/2021    MCV 91 05/10/2023    MCV 94 06/04/2021    MCH 29.0 05/10/2023    MCH 30.0 06/04/2021    MCHC 31.8 05/10/2023    MCHC 31.8 06/04/2021    RDW 15.3 (H) 05/10/2023    RDW 15.5 (H) 06/04/2021     05/10/2023     06/04/2021       CMP RESULTS:  Lab Results   Component Value Date     05/10/2023     06/04/2021    POTASSIUM 4.0 05/10/2023    POTASSIUM 4.1 08/23/2022    POTASSIUM 4.0 06/04/2021    CHLORIDE  102 05/10/2023    CHLORIDE 107 08/23/2022    CHLORIDE 106 06/04/2021    CO2 27 05/10/2023    CO2 22 08/23/2022    CO2 25 06/04/2021    ANIONGAP 12 05/10/2023    ANIONGAP 8 08/23/2022    ANIONGAP 6 06/04/2021     (H) 05/10/2023     (H) 03/09/2023     (H) 08/23/2022     (H) 06/04/2021    BUN 18.8 05/10/2023    BUN 17 08/23/2022    BUN 14 06/04/2021    CR 0.95 05/10/2023    CR 1.11 (H) 06/04/2021    GFRESTIMATED 59 (L) 05/10/2023    GFRESTIMATED 47 (L) 06/04/2021    GFRESTBLACK 54 (L) 06/04/2021    CLAUDY 9.5 05/10/2023    CLAUDY 8.8 06/04/2021    BILITOTAL 0.4 05/10/2023    BILITOTAL 0.6 12/04/2020    ALBUMIN 4.1 05/10/2023    ALBUMIN 3.2 (L) 08/23/2022    ALBUMIN 3.5 06/04/2021    ALKPHOS 85 05/10/2023    ALKPHOS 95 12/04/2020    ALT 13 05/10/2023    ALT 25 06/04/2021    AST 21 05/10/2023    AST 17 06/04/2021        INR RESULTS:  Lab Results   Component Value Date    INR 1.43 (H) 11/10/2022    INR 1.36 (H) 03/03/2020       Lab Results   Component Value Date    MAG 1.8 03/14/2023    MAG 2.0 04/11/2017     Lab Results   Component Value Date    NTBNPI 3,531 (H) 04/06/2017     Lab Results   Component Value Date    NTBNP 1,004 04/13/2023    NTBNP 176 08/27/2020       Cardiac Diagnostics:    9/9/2021 Echo  Interpretation Summary  Global and regional left ventricular function is normal with an EF of 55-60%.  The right ventricle is normal size. Global right ventricular function is  normal.  No significant valvular abnormalities.  IVC diameter >2.1 cm collapsing <50% with sniff suggests a high RA pressure  estimated at 15 mmHg or greater.  This study was compared with the study from 08/27/2020. The LA filling  pressures are higher. There are no significant changes in the biventricular  function or aortic calibers.        Assessment and Plan:   Ms. Tee is a 83 year old female with medical history pertinent for interstitial lung disease (moderate restriction), Sjogren's syndrome, HTN, atrial  fibrillation s/p Watchman, diverticulitis s/p colectomy 2011, and HFpEF. She presents to [facility] for routine follow up.     Appearing and feeling well today. No acute concerns. Appears euvolemic by exam. Blood pressure well controlled. Review of BMP with stable renal function and normal lytes. I do not recommend any medication changes today. Following hip replacement we will see back in follow up and can consider resuming low dose spironolactone and or adding an SGLT2 at that time. ECG in clinic demonstrates sinus bradycardia at 52 bpm with first degree AVB,  ms. Hold ASA starting today for upcoming surgery. Recommend following up 6-8 weeks.     # Chronic diastolic heart failure/HFpEF (EF 55-60%)  Risk factors include female gender, age, HTN, diabetes, sleep apnea, obesity and arrhythmia. The mainstays of therapy for HFpEF include volume management, blood pressure control, treating underlying sleep apnea if present, treatment of underlying CAD if within the goals of care, weight management, exercise training, rate control for atrial fibrillation as well as consideration for a rhythm control strategy, and consideration for clinical trials. Consideration of spironolactone in low risk patients should be taken given the positive HF results in the TOPCAT trial.  Stage C. NYHA Class II.    Primary Cardiologist: Dr. Rosa; Last seen 4/2023  BP: controlled   Fluid status: Euvolemic  Aldosterone antagonist: holding for hypotension  SGLT2i: may consider starting following hip replacement surgery   Ischemia evaluation: Complete NM stress negative (6/2014)  ACEi/ARB/ARNI: n/a, no evidence for use in HFpEF  BB: n/a, no evidence for use in HFpEF, BB for AF  SCD prophylaxis: n/a, no evidence for use in HFpEF  NSAID use: contraindicated  Sleep apnea: CPAP compliant     # Paroxysmal Atrial fibrillation, s/p Watchman 9/2022  [id number] (age >75, HTN, CHF, gender)   - continue BB and ASA 81 mg daily    # Primary prevention  -  Continue statin and ASA     # Pre-op Cardiac Clearance  See Dr. Rosa's note 4/13/23  Hip surgery stratification; she is presently euvolemic on exam, her ejection fraction is normal-given her history of atrial fibrillation she is at moderate risk for postop atrial fibrillation and I think low risk for perioperative MI.  She can hold aspirin 1 week prior and resume after bleeding is controlled post OR.  Overall she is moderate risk for atrial fibrillation but overall low risk for hypotension and pulmonary edema.         Follow up:  - [facility] in 6-8 weeks   - Dr. Rosa with echo 9/2023        Lisbet Joseph DNP, NP-C  Advance Heart Failure  05/24/23

## 2023-05-24 NOTE — NURSING NOTE
Chief Complaint   Patient presents with     Follow Up     5/24/23: Return CORE, HFpEF, labs prior- Needs Zio placed     Vitals were taken and medications reconciled.    Wale Atwood, EMT  4:13 PM

## 2023-05-24 NOTE — PATIENT INSTRUCTIONS
Take your medicines every day, as directed    Changes made today:  No medication changes; hold aspirin starting today     Monitor Your Weight and Symptoms    Contact us if you:    Gain 2 pounds in one day or 5 pounds in one week  Feel more short of breath  Notice more leg swelling  Feel lightheadeded   Change your lifestyle    Limit Salt or Sodium:  2000 mg  Limit Fluids:  2000 mL or approximately 64 ounces  Eat a Heart Healthy Diet  Low in saturated fats  Stay Active:  Aim to move at least 150 minutes every  week         To Contact us    During Business Hours:  [phone], option # 1      After hours, weekends or holidays:   [phone], Option #4  Ask to speak to the On-Call Cardiologist. Inform them you are a CORE/heart failure patient at the [facility].     Use [facility] allows you to communicate directly with your heart team through secure messaging.  [employer] can be accessed any time on your phone, computer, or tablet.  If you need assistance, we'd be happy to help!         Keep your Heart Appointments:    Dr. Rosa with labs and Echo prior 9/7/23 @ 12:45     Please consider attending our virtual support group which is held monthly. Please reach out to Siddharth at [phone] for more information if you are interested in attending.       2023 dates:    Monday, May 1st, 1-2pm (Topic: CORE clinic)  Monday, June 5th, 1-2pm (Topic: Exercise and Cardiac Rehab: An Essential Tool in Managing Heart Failure)  Monday, July 3rd, 1-2pm  Monday, August 7th, 1-2pm  Monday, September 11th, 1-2pm  Monday, October 2nd, 1-2pm  Monday, November 6th, 1-2pm  Monday, December 4th, 1-2pm

## 2023-05-24 NOTE — NURSING NOTE
Labs: Patient was given results of the laboratory testing obtained today. Patient was instructed to return for the next laboratory testing in September with Dr. Rosa appt.  . Patient demonstrated understanding of this information and agreed to call with further questions or concerns.     Return Appointment:  -Keep Follow-up appt with Dr. Rosa in September with labs and echo prior.    Patient given instructions regarding scheduling next clinic visit. Patient demonstrated understanding of this information and agreed to call with further questions or concerns.    Patient stated she understood all health information given and agreed to call with further questions or concerns.    Carmenza Ferguson RN

## 2023-05-26 ENCOUNTER — TELEPHONE (OUTPATIENT)
Dept: ORTHOPEDICS | Facility: CLINIC | Age: 83
End: 2023-05-26
Payer: MEDICARE

## 2023-05-26 LAB
ATRIAL RATE - MUSE: 52 BPM
DIASTOLIC BLOOD PRESSURE - MUSE: NORMAL MMHG
INTERPRETATION ECG - MUSE: NORMAL
P AXIS - MUSE: 105 DEGREES
PR INTERVAL - MUSE: 230 MS
QRS DURATION - MUSE: 66 MS
QT - MUSE: 490 MS
QTC - MUSE: 455 MS
R AXIS - MUSE: 22 DEGREES
SYSTOLIC BLOOD PRESSURE - MUSE: NORMAL MMHG
T AXIS - MUSE: 37 DEGREES
VENTRICULAR RATE- MUSE: 52 BPM

## 2023-05-26 NOTE — TELEPHONE ENCOUNTER
Jessica () from [facility] is calling on order for TCU. Would like to speak to patients care team, please call back [phone] - Jessica is working this weekend so team could reach her over the weekend as well.

## 2023-05-26 NOTE — TELEPHONE ENCOUNTER
Jessica called back and we discussed that the form needed to admit patient to TCU needs to be signed by provider. Let her know we can get this signed on Tuesday and faxed back. She will also need this post dated to 6/1/23, patients anticipated discharge date.  Julia Carrasco RN

## 2023-05-30 ENCOUNTER — ANESTHESIA EVENT (OUTPATIENT)
Dept: SURGERY | Facility: CLINIC | Age: 83
End: 2023-05-30
Payer: MEDICARE

## 2023-05-30 ASSESSMENT — ENCOUNTER SYMPTOMS: DYSRHYTHMIAS: 1

## 2023-05-30 ASSESSMENT — LIFESTYLE VARIABLES: TOBACCO_USE: 0

## 2023-05-30 NOTE — ANESTHESIA PREPROCEDURE EVALUATION
Anesthesia Pre-Procedure Evaluation    Patient: Betty Tee   MRN: [id number] : 1940        Procedure : Procedure(s):  ARTHROPLASTY, RIGHT HIP, TOTAL (Right: Hip)          Past Medical History:   Diagnosis Date     Alcohol abuse, in remission      Allergic rhinitis, cause unspecified     allegra helps when she takes it     Antiplatelet or antithrombotic long-term use      Atrial fibrillation (H)     in hosp in  after surgery w/ fluid overload     Cardiomegaly     LVH on stress echo- cardiac w/u at [facility] ER- neg CT scan for PE, neg stress echo in      Chest pain, unspecified      Congestive heart failure (H)      Depressive disorder      Diabetes (H)      Disorder of bone and cartilage, unspecified     osteopenia (had been on prempro), improved on  dexa, stable dexa 11/10     Diverticulosis of colon (without mention of hemorrhage)     last episode yrs ago     Essential hypertension, benign      Follicular bronchiolitis (H)     associated with Sjogrens, dx by chest CT showing mosaic attenuation and air trapping     Gastro-oesophageal reflux disease      ILD (interstitial lung disease) (H)     associated with Sjogrens, also has mildly elevated IgG4, first noted on chest CT  (mild changes) and also has small airways disease; ILD improved on follow up chest CT 2018.     Insomnia, unspecified     weaned off clonazepam     Irregular heart beat      Lumbago 2009    MRI with NEAL, now seeing Dr. Cain for sciatic sx's     Major depressive disorder, recurrent episode, moderate (H)      Obstructive sleep apnea     uses cpap     Osteoarthrosis, unspecified whether generalized or localized, unspecified site      Sjogren's syndrome (H)     + RG and SSA and lip bx     Sleep apnea     uses a cpap machine     Tobacco use disorder     chantix in , started again in , working      Past Surgical History:   Procedure Laterality Date     APPENDECTOMY       BACK SURGERY       BACK SURGERY        BIOPSY BREAST  09/27/2002    Biopsy Left Breast     BIOPSY BREAST Left 09/27/2002     BREAST SURGERY       CARDIAC SURGERY       CARDIAC SURGERY       CHOLECYSTECTOMY  1990's?     CHOLECYSTECTOMY       COLECTOMY LEFT  11/07/2011    Procedure:COLECTOMY LEFT; Laparoscopic mobilization of splenic flexture, sigmoid colectomy, coloprotoscopy, loop illeostomy; Surgeon:CK SIDDIQI; Location:[facility] OR     COLECTOMY LEFT  11/07/2011     COLONOSCOPY  1990,s     COLONOSCOPY N/A 5/3/2022    Procedure: COLONOSCOPY;  Surgeon: Guru Winsome Dias MD;  Location: [facility] GI     CV LEFT ATRIAL APPENDAGE CLOSURE N/A 9/26/2022    Procedure: Left Atrial Appendage Closure;  Surgeon: Uzair Crews MD;  Location:  HEART CARDIAC CATH LAB     ENT SURGERY       EYE SURGERY  2012     FLEXIBLE SIGMOIDOSCOPY  11/03/2011     HYSTERECTOMY TOTAL ABDOMINAL, BILATERAL SALPINGO-OOPHORECTOMY, COMBINED  11/07/2011    Procedure:COMBINED HYSTERECTOMY TOTAL ABDOMINAL, BILATERAL SALPINGO-OOPHORECTOMY; total abdominal hysterectomy, bilateral salpingo-oophorectomy; Surgeon:ALETA MANUEL; Location:[facility] OR     HYSTERECTOMY TOTAL ABDOMINAL, BILATERAL SALPINGO-OOPHORECTOMY, COMBINED  11/07/2011     ILEOSTOMY  02/01/2012    takedown loop ileostomy      INSERT STENT URETER  11/07/2011    Procedure:INSERT STENT URETER; Placement of Bilateral Ureteral Stents ; Surgeon:PRANEETH BRYANT; Location:[facility] OR     SIGMOIDECTOMY  02/01/2012    Dr. Siddiqi, Sigmoidectomy for diverticular abscess, iliostomy afterwards until repair     SIGMOIDOSCOPY FLEXIBLE  11/03/2011    Procedure:SIGMOIDOSCOPY FLEXIBLE; Flexible Sigmoidoscopy; Surgeon:CK SIDDIQI; Location:[facility] OR     TAKEDOWN ILEOSTOMY  02/01/2012    Procedure:TAKEDOWN ILEOSTOMY; Takedown Loop Ileostomy ; Surgeon:CK SIDDIQI; Location:[facility] OR     URETERAL STENT PLACEMENT  11/07/2011     ZZC APPENDECTOMY  1970's?     ZZC NONSPECIFIC PROCEDURE  11/2005    exploratory abd lap, adhesions, resolved  RLQ pain, diverticulitis episodes      Allergies   Allergen Reactions     Amoxicillin-Pot Clavulanate Nausea and Vomiting     Amoxicillin-Pot Clavulanate      Codeine Nausea and Vomiting     PN: LW Reaction: HIVES     Penicillins Nausea and Vomiting     PN: LW Reaction: GI Upset     Phenobarbital Itching     Seasonal Allergies       Social History     Tobacco Use     Smoking status: Former     Packs/day: 0.50     Types: Cigarettes     Start date:      Quit date: 2011     Years since quittin.8     Smokeless tobacco: Never     Tobacco comments:     /2 ppd   Vaping Use     Vaping status: Never Used   Substance Use Topics     Alcohol use: No     Alcohol/week: 0.0 standard drinks of alcohol     Comment: In recovery beginning       Wt Readings from Last 1 Encounters:   23 79.2 kg (174 lb 8 oz)        Anesthesia Evaluation   Pt has had prior anesthetic. Type: General and MAC.    History of anesthetic complications  - PONV.      ROS/MED HX  ENT/Pulmonary: Comment: Follicular bronchiolitis without ILD  PFTs normal and improved     (+) sleep apnea, uses CPAP, allergic rhinitis,  (-) tobacco use and asthma   Neurologic:  - neg neurologic ROS     Cardiovascular:     (+) Dyslipidemia hypertension-----Taking blood thinners CHF dysrhythmias, a-fib, Irregular Heartbeat/Palpitations, Previous cardiac testing   Echo: Date:  Results:  Interpretation Summary  PROCEDURE:  Intra-procedural VERONICA for left atrial appendage closure with 24 mm WATCHMAN  FLEX device.     BASELINE SURVEY:  1. Normal left ventricular systolic function. LVEF 55 to 60%.  2. No intracardiac thrombus.  3. No pericardial effusion.  4. Maximal left atrial appendage orifice diameter 1.9 cm at45 degrees.     PROCEDURAL MONITORIN. Trans-septal puncture, guiding catheter placement and device delivery  performed under VERONICA guidance.  2. Stable device position without any significant device leak. Adequate  compression documented.      POST-PROCEDURAL SURVEY:  1. Left ventricular function is unchanged.  2. No pericardial effusion.  Stress Test: Date: 2014 Results:  Negative  ECG Reviewed: Date: Results:    Cath: Date: Results:      METS/Exercise Tolerance: 3 - Able to walk 1-2 blocks without stopping Comment: Due to hip pain   Hematologic:     (+) anemia,     Musculoskeletal:   (+) arthritis,     GI/Hepatic: Comment: H/O ileostomy and reversal.     (+) GERD, liver disease,     Renal/Genitourinary:     (+) renal disease, type: CRI, Nephrolithiasis ,     Endo:     (+) type II DM, Using insulin,     Psychiatric/Substance Use:     (+) psychiatric history anxiety and depression alcohol abuse     Infectious Disease:       Malignancy:  - neg malignancy ROS     Other:      (+) , H/O Chronic Pain,        Physical Exam    Airway        Mallampati: II   TM distance: > 3 FB   Neck ROM: full   Mouth opening: > 3 cm    Respiratory Devices and Support         Dental  no notable dental history         Cardiovascular   cardiovascular exam normal       Rhythm and rate: irregular     Pulmonary   pulmonary exam normal        breath sounds clear to auscultation           OUTSIDE LABS:  CBC:   Lab Results   Component Value Date    WBC 7.8 05/10/2023    WBC 6.1 03/21/2023    HGB 12.8 05/10/2023    HGB 12.5 03/31/2023    HCT 40.3 05/10/2023    HCT 40.1 03/21/2023     05/10/2023     03/21/2023     BMP:   Lab Results   Component Value Date     05/24/2023     05/10/2023    POTASSIUM 3.5 05/24/2023    POTASSIUM 4.0 05/10/2023    CHLORIDE 101 05/24/2023    CHLORIDE 102 05/10/2023    CO2 27 05/24/2023    CO2 27 05/10/2023    BUN 20.3 05/24/2023    BUN 18.8 05/10/2023    CR 0.98 (H) 05/24/2023    CR 0.95 05/10/2023     (H) 05/24/2023     (H) 05/10/2023     COAGS:   Lab Results   Component Value Date    PTT 31 11/10/2022    INR 1.43 (H) 11/10/2022     POC:   Lab Results   Component Value Date     (H) 10/01/2020     HEPATIC:   Lab  Results   Component Value Date    ALBUMIN 4.1 05/10/2023    PROTTOTAL 7.3 05/10/2023    ALT 13 05/10/2023    AST 21 05/10/2023    ALKPHOS 85 05/10/2023    BILITOTAL 0.4 05/10/2023     OTHER:   Lab Results   Component Value Date    PH 7.38 04/06/2017    LACT 1.6 03/07/2023    A1C 7.3 (H) 03/14/2023    CLAUDY 9.4 05/24/2023    PHOS 3.2 03/14/2023    MAG 1.8 03/14/2023    LIPASE 21 03/07/2023    TSH 2.14 03/14/2023    CRP 10.7 (H) 07/08/2022    SED 19 01/13/2023       Anesthesia Plan    ASA Status:  3   NPO Status:  NPO Appropriate    Anesthesia Type: Spinal.   Induction: Intravenous, Propofol.   Maintenance: TIVA.   Techniques and Equipment:     - Lines/Monitors: 2nd IV, BIS     Consents    Anesthesia Plan(s) and associated risks, benefits, and realistic alternatives discussed. Questions answered and patient/representative(s) expressed understanding.    - Discussed:     - Discussed with:  Patient      - Extended Intubation/Ventilatory Support Discussed: No.      - Patient is DNR/DNI Status: No    Use of blood products discussed: No .     Postoperative Care    Pain management: IV analgesics, Oral pain medications, Neuraxial analgesia, Multi-modal analgesia.   PONV prophylaxis: Ondansetron (or other 5HT-3), Dexamethasone or Solumedrol, Background Propofol Infusion     Comments:                    Anum Dean MD

## 2023-05-31 ENCOUNTER — HOSPITAL ENCOUNTER (OUTPATIENT)
Facility: CLINIC | Age: 83
Discharge: NURSING, REHAB OR LONG-TERM CARE | End: 2023-06-05
Attending: ORTHOPAEDIC SURGERY | Admitting: ORTHOPAEDIC SURGERY
Payer: MEDICARE

## 2023-05-31 ENCOUNTER — ANESTHESIA (OUTPATIENT)
Dept: SURGERY | Facility: CLINIC | Age: 83
End: 2023-05-31
Payer: MEDICARE

## 2023-05-31 ENCOUNTER — APPOINTMENT (OUTPATIENT)
Dept: GENERAL RADIOLOGY | Facility: CLINIC | Age: 83
End: 2023-05-31
Payer: MEDICARE

## 2023-05-31 ENCOUNTER — TELEPHONE (OUTPATIENT)
Dept: ORTHOPEDICS | Facility: CLINIC | Age: 83
End: 2023-05-31

## 2023-05-31 ENCOUNTER — APPOINTMENT (OUTPATIENT)
Dept: PHYSICAL THERAPY | Facility: CLINIC | Age: 83
End: 2023-05-31
Attending: ORTHOPAEDIC SURGERY
Payer: MEDICARE

## 2023-05-31 DIAGNOSIS — M16.11 PRIMARY OSTEOARTHRITIS OF RIGHT HIP: Primary | ICD-10-CM

## 2023-05-31 DIAGNOSIS — I48.0 PAROXYSMAL ATRIAL FIBRILLATION (H): ICD-10-CM

## 2023-05-31 DIAGNOSIS — D50.9 IRON DEFICIENCY ANEMIA, UNSPECIFIED IRON DEFICIENCY ANEMIA TYPE: ICD-10-CM

## 2023-05-31 DIAGNOSIS — E53.8 VITAMIN B12 DEFICIENCY (NON ANEMIC): ICD-10-CM

## 2023-05-31 DIAGNOSIS — K92.2 LOWER GI BLEED: ICD-10-CM

## 2023-05-31 DIAGNOSIS — R42 DIZZINESS: ICD-10-CM

## 2023-05-31 DIAGNOSIS — M35.02 SJOGREN'S SYNDROME WITH LUNG INVOLVEMENT (H): ICD-10-CM

## 2023-05-31 DIAGNOSIS — J84.9 ILD (INTERSTITIAL LUNG DISEASE) (H): ICD-10-CM

## 2023-05-31 LAB
GLUCOSE BLDC GLUCOMTR-MCNC: 155 MG/DL (ref 70–99)
GLUCOSE BLDC GLUCOMTR-MCNC: 155 MG/DL (ref 70–99)
GLUCOSE BLDC GLUCOMTR-MCNC: 187 MG/DL (ref 70–99)
GLUCOSE BLDC GLUCOMTR-MCNC: 196 MG/DL (ref 70–99)
GLUCOSE BLDC GLUCOMTR-MCNC: 209 MG/DL (ref 70–99)
GLUCOSE BLDC GLUCOMTR-MCNC: 230 MG/DL (ref 70–99)
GLUCOSE BLDC GLUCOMTR-MCNC: 235 MG/DL (ref 70–99)
GLUCOSE BLDC GLUCOMTR-MCNC: 237 MG/DL (ref 70–99)
GLUCOSE BLDC GLUCOMTR-MCNC: 414 MG/DL (ref 70–99)
LACTATE SERPL-SCNC: 2 MMOL/L (ref 0.7–2)

## 2023-05-31 PROCEDURE — 250N000011 HC RX IP 250 OP 636

## 2023-05-31 PROCEDURE — 258N000003 HC RX IP 258 OP 636

## 2023-05-31 PROCEDURE — 360N000077 HC SURGERY LEVEL 4, PER MIN: Performed by: ORTHOPAEDIC SURGERY

## 2023-05-31 PROCEDURE — 250N000013 HC RX MED GY IP 250 OP 250 PS 637

## 2023-05-31 PROCEDURE — 999N000065 XR PELVIS AND HIP PORTABLE RIGHT 1 VIEW

## 2023-05-31 PROCEDURE — 250N000013 HC RX MED GY IP 250 OP 250 PS 637: Performed by: STUDENT IN AN ORGANIZED HEALTH CARE EDUCATION/TRAINING PROGRAM

## 2023-05-31 PROCEDURE — 99214 OFFICE O/P EST MOD 30 MIN: CPT | Performed by: STUDENT IN AN ORGANIZED HEALTH CARE EDUCATION/TRAINING PROGRAM

## 2023-05-31 PROCEDURE — C1776 JOINT DEVICE (IMPLANTABLE): HCPCS | Performed by: ORTHOPAEDIC SURGERY

## 2023-05-31 PROCEDURE — 27130 TOTAL HIP ARTHROPLASTY: CPT | Mod: RT | Performed by: ORTHOPAEDIC SURGERY

## 2023-05-31 PROCEDURE — 370N000017 HC ANESTHESIA TECHNICAL FEE, PER MIN: Performed by: ORTHOPAEDIC SURGERY

## 2023-05-31 PROCEDURE — 82962 GLUCOSE BLOOD TEST: CPT

## 2023-05-31 PROCEDURE — 250N000013 HC RX MED GY IP 250 OP 250 PS 637: Performed by: CLINICAL NURSE SPECIALIST

## 2023-05-31 PROCEDURE — 250N000009 HC RX 250

## 2023-05-31 PROCEDURE — 250N000012 HC RX MED GY IP 250 OP 636 PS 637: Performed by: STUDENT IN AN ORGANIZED HEALTH CARE EDUCATION/TRAINING PROGRAM

## 2023-05-31 PROCEDURE — 83605 ASSAY OF LACTIC ACID: CPT | Performed by: CLINICAL NURSE SPECIALIST

## 2023-05-31 PROCEDURE — 97110 THERAPEUTIC EXERCISES: CPT | Mod: GP

## 2023-05-31 PROCEDURE — 97161 PT EVAL LOW COMPLEX 20 MIN: CPT | Mod: GP

## 2023-05-31 PROCEDURE — 94642 AEROSOL INHALATION TREATMENT: CPT

## 2023-05-31 PROCEDURE — 250N000011 HC RX IP 250 OP 636: Performed by: STUDENT IN AN ORGANIZED HEALTH CARE EDUCATION/TRAINING PROGRAM

## 2023-05-31 PROCEDURE — C1713 ANCHOR/SCREW BN/BN,TIS/BN: HCPCS | Performed by: ORTHOPAEDIC SURGERY

## 2023-05-31 PROCEDURE — 710N000010 HC RECOVERY PHASE 1, LEVEL 2, PER MIN: Performed by: ORTHOPAEDIC SURGERY

## 2023-05-31 PROCEDURE — 250N000011 HC RX IP 250 OP 636: Performed by: ANESTHESIOLOGY

## 2023-05-31 PROCEDURE — 250N000009 HC RX 250: Performed by: ANESTHESIOLOGY

## 2023-05-31 PROCEDURE — 999N000141 HC STATISTIC PRE-PROCEDURE NURSING ASSESSMENT: Performed by: ORTHOPAEDIC SURGERY

## 2023-05-31 PROCEDURE — 36415 COLL VENOUS BLD VENIPUNCTURE: CPT | Performed by: CLINICAL NURSE SPECIALIST

## 2023-05-31 PROCEDURE — 272N000001 HC OR GENERAL SUPPLY STERILE: Performed by: ORTHOPAEDIC SURGERY

## 2023-05-31 PROCEDURE — 97530 THERAPEUTIC ACTIVITIES: CPT | Mod: GP

## 2023-05-31 DEVICE — IMPLANTABLE DEVICE: Type: IMPLANTABLE DEVICE | Site: HIP | Status: FUNCTIONAL

## 2023-05-31 DEVICE — R3 3 HOLE ACETABULAR SHELL 48MM
Type: IMPLANTABLE DEVICE | Site: HIP | Status: FUNCTIONAL
Brand: R3 ACETABULAR

## 2023-05-31 DEVICE — IMP LINER S&N ACET R3 XLPE 36X48MM 0DEG 71339548: Type: IMPLANTABLE DEVICE | Site: HIP | Status: FUNCTIONAL

## 2023-05-31 DEVICE — REFLECTION SPHERICAL HEAD SCREW 20MM
Type: IMPLANTABLE DEVICE | Site: HIP | Status: FUNCTIONAL
Brand: REFLECTION

## 2023-05-31 DEVICE — REFLECTION SPHERICAL HEAD SCREW 40MM
Type: IMPLANTABLE DEVICE | Site: HIP | Status: FUNCTIONAL
Brand: REFLECTION

## 2023-05-31 DEVICE — BONE CEMENT RESTRICTOR BUCK FEMORAL 30MM 71279420: Type: IMPLANTABLE DEVICE | Site: HIP | Status: FUNCTIONAL

## 2023-05-31 DEVICE — BONE CEMENT SIMPLEX W/TOBRAMYCIN 6197-9-001: Type: IMPLANTABLE DEVICE | Site: HIP | Status: FUNCTIONAL

## 2023-05-31 DEVICE — COBALT CHROME 12/14 TAPER FEMORAL                                    HEAD 32MM + 4: Type: IMPLANTABLE DEVICE | Site: HIP | Status: FUNCTIONAL

## 2023-05-31 RX ORDER — TRAZODONE HYDROCHLORIDE 150 MG/1
150 TABLET ORAL
Status: DISCONTINUED | OUTPATIENT
Start: 2023-05-31 | End: 2023-06-05 | Stop reason: HOSPADM

## 2023-05-31 RX ORDER — DEXTROSE MONOHYDRATE 100 MG/ML
INJECTION, SOLUTION INTRAVENOUS CONTINUOUS PRN
Status: DISCONTINUED | OUTPATIENT
Start: 2023-05-31 | End: 2023-06-05 | Stop reason: HOSPADM

## 2023-05-31 RX ORDER — LORATADINE 10 MG/1
10 TABLET ORAL DAILY
Status: DISCONTINUED | OUTPATIENT
Start: 2023-06-01 | End: 2023-06-05 | Stop reason: HOSPADM

## 2023-05-31 RX ORDER — NALOXONE HYDROCHLORIDE 0.4 MG/ML
0.4 INJECTION, SOLUTION INTRAMUSCULAR; INTRAVENOUS; SUBCUTANEOUS
Status: DISCONTINUED | OUTPATIENT
Start: 2023-05-31 | End: 2023-06-05 | Stop reason: HOSPADM

## 2023-05-31 RX ORDER — HYDROXYZINE HYDROCHLORIDE 50 MG/1
50 TABLET, FILM COATED ORAL EVERY 6 HOURS PRN
Status: DISCONTINUED | OUTPATIENT
Start: 2023-05-31 | End: 2023-06-05 | Stop reason: HOSPADM

## 2023-05-31 RX ORDER — ESCITALOPRAM OXALATE 20 MG/1
20 TABLET ORAL DAILY
Status: DISCONTINUED | OUTPATIENT
Start: 2023-06-01 | End: 2023-06-05 | Stop reason: HOSPADM

## 2023-05-31 RX ORDER — PROPOFOL 10 MG/ML
INJECTION, EMULSION INTRAVENOUS PRN
Status: DISCONTINUED | OUTPATIENT
Start: 2023-05-31 | End: 2023-05-31

## 2023-05-31 RX ORDER — CEFAZOLIN SODIUM 2 G/100ML
2 INJECTION, SOLUTION INTRAVENOUS EVERY 8 HOURS
Status: COMPLETED | OUTPATIENT
Start: 2023-05-31 | End: 2023-06-01

## 2023-05-31 RX ORDER — LANOLIN ALCOHOL/MO/W.PET/CERES
3 CREAM (GRAM) TOPICAL
Status: DISCONTINUED | OUTPATIENT
Start: 2023-05-31 | End: 2023-06-05 | Stop reason: HOSPADM

## 2023-05-31 RX ORDER — LIDOCAINE 40 MG/G
CREAM TOPICAL
Status: DISCONTINUED | OUTPATIENT
Start: 2023-05-31 | End: 2023-06-05 | Stop reason: HOSPADM

## 2023-05-31 RX ORDER — TORSEMIDE 20 MG/1
40 TABLET ORAL
Status: DISCONTINUED | OUTPATIENT
Start: 2023-06-01 | End: 2023-06-05 | Stop reason: HOSPADM

## 2023-05-31 RX ORDER — OXYCODONE HYDROCHLORIDE 5 MG/1
5 TABLET ORAL
Status: DISCONTINUED | OUTPATIENT
Start: 2023-05-31 | End: 2023-06-04

## 2023-05-31 RX ORDER — POTASSIUM CHLORIDE 1500 MG/1
40 TABLET, EXTENDED RELEASE ORAL DAILY
Status: DISCONTINUED | OUTPATIENT
Start: 2023-06-01 | End: 2023-06-05 | Stop reason: HOSPADM

## 2023-05-31 RX ORDER — POLYETHYLENE GLYCOL 3350 17 G/17G
17 POWDER, FOR SOLUTION ORAL DAILY
Status: DISCONTINUED | OUTPATIENT
Start: 2023-06-01 | End: 2023-06-05 | Stop reason: HOSPADM

## 2023-05-31 RX ORDER — PROCHLORPERAZINE MALEATE 5 MG
5 TABLET ORAL EVERY 6 HOURS PRN
Status: DISCONTINUED | OUTPATIENT
Start: 2023-05-31 | End: 2023-06-05 | Stop reason: HOSPADM

## 2023-05-31 RX ORDER — ACETAMINOPHEN 325 MG/1
975 TABLET ORAL ONCE
Status: DISCONTINUED | OUTPATIENT
Start: 2023-05-31 | End: 2023-05-31 | Stop reason: HOSPADM

## 2023-05-31 RX ORDER — LANOLIN ALCOHOL/MO/W.PET/CERES
1000 CREAM (GRAM) TOPICAL
Status: DISCONTINUED | OUTPATIENT
Start: 2023-06-02 | End: 2023-06-05 | Stop reason: HOSPADM

## 2023-05-31 RX ORDER — LIDOCAINE 40 MG/G
CREAM TOPICAL
Status: DISCONTINUED | OUTPATIENT
Start: 2023-05-31 | End: 2023-05-31 | Stop reason: HOSPADM

## 2023-05-31 RX ORDER — ONDANSETRON 2 MG/ML
4 INJECTION INTRAMUSCULAR; INTRAVENOUS EVERY 6 HOURS PRN
Status: DISCONTINUED | OUTPATIENT
Start: 2023-05-31 | End: 2023-06-05 | Stop reason: HOSPADM

## 2023-05-31 RX ORDER — MONTELUKAST SODIUM 10 MG/1
10 TABLET ORAL AT BEDTIME
Status: DISCONTINUED | OUTPATIENT
Start: 2023-05-31 | End: 2023-06-05 | Stop reason: HOSPADM

## 2023-05-31 RX ORDER — SODIUM CHLORIDE, SODIUM LACTATE, POTASSIUM CHLORIDE, CALCIUM CHLORIDE 600; 310; 30; 20 MG/100ML; MG/100ML; MG/100ML; MG/100ML
INJECTION, SOLUTION INTRAVENOUS CONTINUOUS
Status: DISCONTINUED | OUTPATIENT
Start: 2023-05-31 | End: 2023-05-31 | Stop reason: HOSPADM

## 2023-05-31 RX ORDER — ASPIRIN 81 MG/1
81 TABLET ORAL DAILY
Status: DISCONTINUED | OUTPATIENT
Start: 2023-05-31 | End: 2023-05-31

## 2023-05-31 RX ORDER — NALOXONE HYDROCHLORIDE 0.4 MG/ML
0.2 INJECTION, SOLUTION INTRAMUSCULAR; INTRAVENOUS; SUBCUTANEOUS
Status: DISCONTINUED | OUTPATIENT
Start: 2023-05-31 | End: 2023-06-05 | Stop reason: HOSPADM

## 2023-05-31 RX ORDER — ONDANSETRON 4 MG/1
4 TABLET, ORALLY DISINTEGRATING ORAL EVERY 6 HOURS PRN
Status: DISCONTINUED | OUTPATIENT
Start: 2023-05-31 | End: 2023-06-05 | Stop reason: HOSPADM

## 2023-05-31 RX ORDER — ACETAMINOPHEN 325 MG/1
650 TABLET ORAL EVERY 4 HOURS PRN
Status: DISCONTINUED | OUTPATIENT
Start: 2023-06-03 | End: 2023-05-31

## 2023-05-31 RX ORDER — SODIUM CHLORIDE, SODIUM LACTATE, POTASSIUM CHLORIDE, CALCIUM CHLORIDE 600; 310; 30; 20 MG/100ML; MG/100ML; MG/100ML; MG/100ML
INJECTION, SOLUTION INTRAVENOUS
Status: COMPLETED
Start: 2023-05-31 | End: 2023-05-31

## 2023-05-31 RX ORDER — HYDROMORPHONE HYDROCHLORIDE 1 MG/ML
0.2 INJECTION, SOLUTION INTRAMUSCULAR; INTRAVENOUS; SUBCUTANEOUS
Status: DISCONTINUED | OUTPATIENT
Start: 2023-05-31 | End: 2023-05-31

## 2023-05-31 RX ORDER — ACETAMINOPHEN 325 MG/1
975 TABLET ORAL EVERY 8 HOURS
Status: COMPLETED | OUTPATIENT
Start: 2023-05-31 | End: 2023-06-03

## 2023-05-31 RX ORDER — FENTANYL CITRATE 50 UG/ML
25 INJECTION, SOLUTION INTRAMUSCULAR; INTRAVENOUS EVERY 5 MIN PRN
Status: DISCONTINUED | OUTPATIENT
Start: 2023-05-31 | End: 2023-05-31 | Stop reason: HOSPADM

## 2023-05-31 RX ORDER — HYDROMORPHONE HYDROCHLORIDE 1 MG/ML
0.3 INJECTION, SOLUTION INTRAMUSCULAR; INTRAVENOUS; SUBCUTANEOUS
Status: DISCONTINUED | OUTPATIENT
Start: 2023-05-31 | End: 2023-06-02

## 2023-05-31 RX ORDER — BISACODYL 10 MG
10 SUPPOSITORY, RECTAL RECTAL DAILY PRN
Status: DISCONTINUED | OUTPATIENT
Start: 2023-05-31 | End: 2023-06-05 | Stop reason: HOSPADM

## 2023-05-31 RX ORDER — METOPROLOL SUCCINATE 25 MG/1
50 TABLET, EXTENDED RELEASE ORAL 2 TIMES DAILY
Status: DISCONTINUED | OUTPATIENT
Start: 2023-05-31 | End: 2023-06-05 | Stop reason: HOSPADM

## 2023-05-31 RX ORDER — PREDNISONE 5 MG/1
5 TABLET ORAL DAILY
Status: DISCONTINUED | OUTPATIENT
Start: 2023-06-01 | End: 2023-05-31

## 2023-05-31 RX ORDER — SODIUM CHLORIDE, SODIUM LACTATE, POTASSIUM CHLORIDE, CALCIUM CHLORIDE 600; 310; 30; 20 MG/100ML; MG/100ML; MG/100ML; MG/100ML
INJECTION, SOLUTION INTRAVENOUS CONTINUOUS
Status: ACTIVE | OUTPATIENT
Start: 2023-05-31 | End: 2023-05-31

## 2023-05-31 RX ORDER — HYDROMORPHONE HYDROCHLORIDE 1 MG/ML
0.2 INJECTION, SOLUTION INTRAMUSCULAR; INTRAVENOUS; SUBCUTANEOUS EVERY 5 MIN PRN
Status: DISCONTINUED | OUTPATIENT
Start: 2023-05-31 | End: 2023-05-31 | Stop reason: HOSPADM

## 2023-05-31 RX ORDER — PANTOPRAZOLE SODIUM 40 MG/1
40 TABLET, DELAYED RELEASE ORAL DAILY
Status: DISCONTINUED | OUTPATIENT
Start: 2023-06-01 | End: 2023-06-05 | Stop reason: HOSPADM

## 2023-05-31 RX ORDER — ALBUTEROL SULFATE 90 UG/1
2 AEROSOL, METERED RESPIRATORY (INHALATION) EVERY 6 HOURS PRN
Status: DISCONTINUED | OUTPATIENT
Start: 2023-05-31 | End: 2023-06-05 | Stop reason: HOSPADM

## 2023-05-31 RX ORDER — HYDROMORPHONE HCL IN WATER/PF 6 MG/30 ML
0.2 PATIENT CONTROLLED ANALGESIA SYRINGE INTRAVENOUS
Status: DISCONTINUED | OUTPATIENT
Start: 2023-05-31 | End: 2023-06-02

## 2023-05-31 RX ORDER — HYDROXYZINE HYDROCHLORIDE 25 MG/1
25 TABLET, FILM COATED ORAL EVERY 6 HOURS PRN
Status: DISCONTINUED | OUTPATIENT
Start: 2023-05-31 | End: 2023-06-05 | Stop reason: HOSPADM

## 2023-05-31 RX ORDER — TIZANIDINE 2 MG/1
2 TABLET ORAL EVERY 6 HOURS PRN
Status: DISCONTINUED | OUTPATIENT
Start: 2023-05-31 | End: 2023-06-05

## 2023-05-31 RX ORDER — DEXTROSE MONOHYDRATE 25 G/50ML
25-50 INJECTION, SOLUTION INTRAVENOUS
Status: DISCONTINUED | OUTPATIENT
Start: 2023-05-31 | End: 2023-06-05 | Stop reason: HOSPADM

## 2023-05-31 RX ORDER — BUPIVACAINE HYDROCHLORIDE 7.5 MG/ML
INJECTION, SOLUTION INTRASPINAL
Status: COMPLETED | OUTPATIENT
Start: 2023-05-31 | End: 2023-05-31

## 2023-05-31 RX ORDER — OXYCODONE HYDROCHLORIDE 5 MG/1
5 TABLET ORAL EVERY 4 HOURS PRN
Status: DISCONTINUED | OUTPATIENT
Start: 2023-05-31 | End: 2023-05-31

## 2023-05-31 RX ORDER — PREDNISONE 5 MG/1
5 TABLET ORAL DAILY
Status: DISCONTINUED | OUTPATIENT
Start: 2023-05-31 | End: 2023-06-05 | Stop reason: HOSPADM

## 2023-05-31 RX ORDER — CELECOXIB 200 MG/1
400 CAPSULE ORAL ONCE
Status: COMPLETED | OUTPATIENT
Start: 2023-05-31 | End: 2023-05-31

## 2023-05-31 RX ORDER — CEFAZOLIN SODIUM/WATER 2 G/20 ML
2 SYRINGE (ML) INTRAVENOUS SEE ADMIN INSTRUCTIONS
Status: DISCONTINUED | OUTPATIENT
Start: 2023-05-31 | End: 2023-05-31 | Stop reason: HOSPADM

## 2023-05-31 RX ORDER — PROPOFOL 10 MG/ML
INJECTION, EMULSION INTRAVENOUS CONTINUOUS PRN
Status: DISCONTINUED | OUTPATIENT
Start: 2023-05-31 | End: 2023-05-31

## 2023-05-31 RX ORDER — VITAMIN B COMPLEX
100 TABLET ORAL DAILY
Status: DISCONTINUED | OUTPATIENT
Start: 2023-06-01 | End: 2023-06-05 | Stop reason: HOSPADM

## 2023-05-31 RX ORDER — ONDANSETRON 2 MG/ML
4 INJECTION INTRAMUSCULAR; INTRAVENOUS EVERY 30 MIN PRN
Status: DISCONTINUED | OUTPATIENT
Start: 2023-05-31 | End: 2023-05-31 | Stop reason: HOSPADM

## 2023-05-31 RX ORDER — SODIUM CHLORIDE, SODIUM LACTATE, POTASSIUM CHLORIDE, CALCIUM CHLORIDE 600; 310; 30; 20 MG/100ML; MG/100ML; MG/100ML; MG/100ML
INJECTION, SOLUTION INTRAVENOUS CONTINUOUS PRN
Status: DISCONTINUED | OUTPATIENT
Start: 2023-05-31 | End: 2023-05-31

## 2023-05-31 RX ORDER — FERROUS GLUCONATE 324(38)MG
324 TABLET ORAL
Status: DISCONTINUED | OUTPATIENT
Start: 2023-06-01 | End: 2023-05-31

## 2023-05-31 RX ORDER — CEFAZOLIN SODIUM/WATER 2 G/20 ML
2 SYRINGE (ML) INTRAVENOUS
Status: COMPLETED | OUTPATIENT
Start: 2023-05-31 | End: 2023-05-31

## 2023-05-31 RX ORDER — HYDROMORPHONE HYDROCHLORIDE 1 MG/ML
0.4 INJECTION, SOLUTION INTRAMUSCULAR; INTRAVENOUS; SUBCUTANEOUS EVERY 5 MIN PRN
Status: DISCONTINUED | OUTPATIENT
Start: 2023-05-31 | End: 2023-05-31 | Stop reason: HOSPADM

## 2023-05-31 RX ORDER — HYDROXYCHLOROQUINE SULFATE 200 MG/1
400 TABLET, FILM COATED ORAL DAILY
Status: DISCONTINUED | OUTPATIENT
Start: 2023-06-01 | End: 2023-06-05 | Stop reason: HOSPADM

## 2023-05-31 RX ORDER — HYDROMORPHONE HYDROCHLORIDE 1 MG/ML
0.1 INJECTION, SOLUTION INTRAMUSCULAR; INTRAVENOUS; SUBCUTANEOUS
Status: DISCONTINUED | OUTPATIENT
Start: 2023-05-31 | End: 2023-05-31

## 2023-05-31 RX ORDER — TRANEXAMIC ACID 650 MG/1
1950 TABLET ORAL ONCE
Status: COMPLETED | OUTPATIENT
Start: 2023-05-31 | End: 2023-05-31

## 2023-05-31 RX ORDER — AZITHROMYCIN 250 MG/1
250 TABLET, FILM COATED ORAL AT BEDTIME
Status: DISCONTINUED | OUTPATIENT
Start: 2023-05-31 | End: 2023-06-05 | Stop reason: HOSPADM

## 2023-05-31 RX ORDER — EPHEDRINE SULFATE 50 MG/ML
INJECTION, SOLUTION INTRAMUSCULAR; INTRAVENOUS; SUBCUTANEOUS PRN
Status: DISCONTINUED | OUTPATIENT
Start: 2023-05-31 | End: 2023-05-31

## 2023-05-31 RX ORDER — FLUTICASONE PROPIONATE 50 MCG
1-2 SPRAY, SUSPENSION (ML) NASAL DAILY PRN
Status: DISCONTINUED | OUTPATIENT
Start: 2023-05-31 | End: 2023-06-05 | Stop reason: HOSPADM

## 2023-05-31 RX ORDER — FENTANYL CITRATE 50 UG/ML
50 INJECTION, SOLUTION INTRAMUSCULAR; INTRAVENOUS EVERY 5 MIN PRN
Status: DISCONTINUED | OUTPATIENT
Start: 2023-05-31 | End: 2023-05-31 | Stop reason: HOSPADM

## 2023-05-31 RX ORDER — ONDANSETRON 2 MG/ML
INJECTION INTRAMUSCULAR; INTRAVENOUS PRN
Status: DISCONTINUED | OUTPATIENT
Start: 2023-05-31 | End: 2023-05-31

## 2023-05-31 RX ORDER — FLUTICASONE FUROATE AND VILANTEROL 100; 25 UG/1; UG/1
1 POWDER RESPIRATORY (INHALATION) DAILY
Status: DISCONTINUED | OUTPATIENT
Start: 2023-05-31 | End: 2023-06-05 | Stop reason: HOSPADM

## 2023-05-31 RX ORDER — NICOTINE POLACRILEX 4 MG
15-30 LOZENGE BUCCAL
Status: DISCONTINUED | OUTPATIENT
Start: 2023-05-31 | End: 2023-06-05 | Stop reason: HOSPADM

## 2023-05-31 RX ORDER — ONDANSETRON 4 MG/1
4 TABLET, ORALLY DISINTEGRATING ORAL EVERY 30 MIN PRN
Status: DISCONTINUED | OUTPATIENT
Start: 2023-05-31 | End: 2023-05-31 | Stop reason: HOSPADM

## 2023-05-31 RX ORDER — FERROUS GLUCONATE 324(38)MG
324 TABLET ORAL
Status: DISCONTINUED | OUTPATIENT
Start: 2023-06-01 | End: 2023-06-05 | Stop reason: HOSPADM

## 2023-05-31 RX ORDER — ALLOPURINOL 100 MG/1
100 TABLET ORAL EVERY EVENING
Status: DISCONTINUED | OUTPATIENT
Start: 2023-05-31 | End: 2023-06-05 | Stop reason: HOSPADM

## 2023-05-31 RX ORDER — AMOXICILLIN 250 MG
1 CAPSULE ORAL 2 TIMES DAILY
Status: DISCONTINUED | OUTPATIENT
Start: 2023-05-31 | End: 2023-06-05 | Stop reason: HOSPADM

## 2023-05-31 RX ORDER — ASPIRIN 81 MG/1
81 TABLET ORAL 2 TIMES DAILY
Status: DISCONTINUED | OUTPATIENT
Start: 2023-06-01 | End: 2023-06-05 | Stop reason: HOSPADM

## 2023-05-31 RX ORDER — ACETAMINOPHEN 325 MG/1
975 TABLET ORAL ONCE
Status: COMPLETED | OUTPATIENT
Start: 2023-05-31 | End: 2023-05-31

## 2023-05-31 RX ADMIN — FENTANYL CITRATE 25 MCG: 50 INJECTION, SOLUTION INTRAMUSCULAR; INTRAVENOUS at 11:14

## 2023-05-31 RX ADMIN — SODIUM CHLORIDE, POTASSIUM CHLORIDE, SODIUM LACTATE AND CALCIUM CHLORIDE: 600; 310; 30; 20 INJECTION, SOLUTION INTRAVENOUS at 07:39

## 2023-05-31 RX ADMIN — TIZANIDINE 2 MG: 2 TABLET ORAL at 21:16

## 2023-05-31 RX ADMIN — HYDROXYZINE HYDROCHLORIDE 25 MG: 25 TABLET, FILM COATED ORAL at 18:37

## 2023-05-31 RX ADMIN — Medication 2 G: at 07:52

## 2023-05-31 RX ADMIN — AZITHROMYCIN MONOHYDRATE 250 MG: 250 TABLET ORAL at 19:44

## 2023-05-31 RX ADMIN — OXYCODONE HYDROCHLORIDE 5 MG: 5 TABLET ORAL at 18:13

## 2023-05-31 RX ADMIN — PREDNISONE 5 MG: 5 TABLET ORAL at 17:01

## 2023-05-31 RX ADMIN — HYDROMORPHONE HYDROCHLORIDE 0.2 MG: 1 INJECTION, SOLUTION INTRAMUSCULAR; INTRAVENOUS; SUBCUTANEOUS at 12:29

## 2023-05-31 RX ADMIN — ONDANSETRON 4 MG: 2 INJECTION INTRAMUSCULAR; INTRAVENOUS at 10:05

## 2023-05-31 RX ADMIN — DEXMEDETOMIDINE HYDROCHLORIDE 8 MCG: 100 INJECTION, SOLUTION INTRAVENOUS at 08:00

## 2023-05-31 RX ADMIN — ACETAMINOPHEN 975 MG: 325 TABLET ORAL at 14:23

## 2023-05-31 RX ADMIN — PROPOFOL 100 MCG/KG/MIN: 10 INJECTION, EMULSION INTRAVENOUS at 07:57

## 2023-05-31 RX ADMIN — ACETAMINOPHEN 975 MG: 325 TABLET ORAL at 21:16

## 2023-05-31 RX ADMIN — Medication 10 MG: at 08:26

## 2023-05-31 RX ADMIN — CEFAZOLIN SODIUM 2 G: 2 INJECTION, SOLUTION INTRAVENOUS at 17:01

## 2023-05-31 RX ADMIN — TRANEXAMIC ACID 1950 MG: 650 TABLET ORAL at 06:46

## 2023-05-31 RX ADMIN — INSULIN GLARGINE 5 UNITS: 100 INJECTION, SOLUTION SUBCUTANEOUS at 16:33

## 2023-05-31 RX ADMIN — INSULIN ASPART 6 UNITS: 100 INJECTION, SOLUTION INTRAVENOUS; SUBCUTANEOUS at 16:36

## 2023-05-31 RX ADMIN — ALLOPURINOL 100 MG: 100 TABLET ORAL at 19:44

## 2023-05-31 RX ADMIN — HYDROMORPHONE HYDROCHLORIDE 0.2 MG: 1 INJECTION, SOLUTION INTRAMUSCULAR; INTRAVENOUS; SUBCUTANEOUS at 19:42

## 2023-05-31 RX ADMIN — BUPIVACAINE HYDROCHLORIDE IN DEXTROSE 1.6 ML: 7.5 INJECTION, SOLUTION SUBARACHNOID at 07:40

## 2023-05-31 RX ADMIN — CELECOXIB 400 MG: 200 CAPSULE ORAL at 06:45

## 2023-05-31 RX ADMIN — METOPROLOL SUCCINATE 50 MG: 25 TABLET, EXTENDED RELEASE ORAL at 19:44

## 2023-05-31 RX ADMIN — SENNOSIDES AND DOCUSATE SODIUM 1 TABLET: 50; 8.6 TABLET ORAL at 19:44

## 2023-05-31 RX ADMIN — SODIUM CHLORIDE, POTASSIUM CHLORIDE, SODIUM LACTATE AND CALCIUM CHLORIDE 1000 ML: 600; 310; 30; 20 INJECTION, SOLUTION INTRAVENOUS at 12:31

## 2023-05-31 RX ADMIN — TIZANIDINE 2 MG: 2 TABLET ORAL at 14:23

## 2023-05-31 RX ADMIN — PROPOFOL 60 MG: 10 INJECTION, EMULSION INTRAVENOUS at 07:55

## 2023-05-31 RX ADMIN — Medication 10 MG: at 08:07

## 2023-05-31 RX ADMIN — INSULIN ASPART 2 UNITS: 100 INJECTION, SOLUTION INTRAVENOUS; SUBCUTANEOUS at 21:19

## 2023-05-31 RX ADMIN — ATORVASTATIN CALCIUM 5 MG: 10 TABLET, FILM COATED ORAL at 21:17

## 2023-05-31 RX ADMIN — FENTANYL CITRATE 50 MCG: 50 INJECTION, SOLUTION INTRAMUSCULAR; INTRAVENOUS at 10:52

## 2023-05-31 RX ADMIN — ACETAMINOPHEN 975 MG: 325 TABLET ORAL at 06:45

## 2023-05-31 RX ADMIN — HUMAN INSULIN 2.5 UNITS/HR: 100 INJECTION, SOLUTION SUBCUTANEOUS at 10:54

## 2023-05-31 RX ADMIN — OXYCODONE HYDROCHLORIDE 5 MG: 5 TABLET ORAL at 21:16

## 2023-05-31 RX ADMIN — FLUTICASONE FUROATE AND VILANTEROL TRIFENATATE 1 PUFF: 100; 25 POWDER RESPIRATORY (INHALATION) at 17:01

## 2023-05-31 RX ADMIN — OXYCODONE HYDROCHLORIDE 5 MG: 5 TABLET ORAL at 11:32

## 2023-05-31 RX ADMIN — MONTELUKAST 10 MG: 10 TABLET, FILM COATED ORAL at 21:16

## 2023-05-31 RX ADMIN — PHENYLEPHRINE HYDROCHLORIDE 0.3 MCG/KG/MIN: 10 INJECTION INTRAVENOUS at 08:20

## 2023-05-31 RX ADMIN — DEXMEDETOMIDINE HYDROCHLORIDE 4 MCG: 100 INJECTION, SOLUTION INTRAVENOUS at 08:52

## 2023-05-31 RX ADMIN — SODIUM CHLORIDE, POTASSIUM CHLORIDE, SODIUM LACTATE AND CALCIUM CHLORIDE: 600; 310; 30; 20 INJECTION, SOLUTION INTRAVENOUS at 14:24

## 2023-05-31 ASSESSMENT — ACTIVITIES OF DAILY LIVING (ADL)
ADLS_ACUITY_SCORE: 31
ADLS_ACUITY_SCORE: 35
ADLS_ACUITY_SCORE: 30
ADLS_ACUITY_SCORE: 31
ADLS_ACUITY_SCORE: 26
ADLS_ACUITY_SCORE: 30
ADLS_ACUITY_SCORE: 31
ADLS_ACUITY_SCORE: 26
ADLS_ACUITY_SCORE: 26

## 2023-05-31 NOTE — PROGRESS NOTES
[facility]  OUTPATIENT PHYSICAL THERAPY EVALUATION  PLAN OF TREATMENT FOR OUTPATIENT REHABILITATION  (COMPLETE FOR INITIAL CLAIMS ONLY)  Patient's Last Name, First Name, M.I.  YOB: 1940  RandalYolandaBetty  T                        Provider's Name  [facility] Medical Record No.  [id number]                             Onset Date:  05/31/23   Start of Care Date:      Type:     _X_PT   ___OT   ___SLP Medical Diagnosis:                 PT Diagnosis:  impaired functional mobility Visits from SOC:  1     See note for plan of treatment, functional goals and certification details    I CERTIFY THE NEED FOR THESE SERVICES FURNISHED UNDER        THIS PLAN OF TREATMENT AND WHILE UNDER MY CARE     (Physician co-signature of this document indicates review and certification of the therapy plan).                  05/31/23 1602   Appointment Info   Signing Clinician's Name / Credentials (PT) Yvonne Marcus DPT       Present no   Language English   Living Environment   People in Home friend(s)   Current Living Arrangements house   Home Accessibility stairs to enter home   Number of Stairs, Main Entrance 3   Stair Railings, Main Entrance railings safe and in good condition;railings on both sides of stairs   Number of Stairs, Within Home, Primary   (flight to basement, does not need to access)   Transportation Anticipated family or friend will provide   Self-Care   Usual Activity Tolerance moderate   Current Activity Tolerance fair   Regular Exercise No   Equipment Currently Used at Home walker, rolling   Fall history within last six months yes   Number of times patient has fallen within last six months 1   General Information   Onset of Illness/Injury or Date of Surgery 05/31/23   Referring Physician Suzanne Yuen, RADHA   Patient/Family Therapy Goals Statement (PT) Did not endorse   Pertinent History of Current Problem (include personal  factors and/or comorbidities that impact the POC) s/p R SRI   Existing Precautions/Restrictions fall;no hip IR;no hip ADD past midline;90 degree hip flexion;no pivoting or twisting   Weight-Bearing Status - LUE full weight-bearing   Weight-Bearing Status - RUE full weight-bearing   Weight-Bearing Status - LLE full weight-bearing   Weight-Bearing Status - RLE weight-bearing as tolerated   General Observations Sitting EOB upon arrival, eating some food and drinking coffee   Cognition   Affect/Mental Status (Cognition) WFL   Orientation Status (Cognition) oriented x 3   Follows Commands (Cognition) WNL   Pain Assessment   Patient Currently in Pain Yes, see Vital Sign flowsheet   Integumentary/Edema   Integumentary/Edema Comments Patient with normal post-surgical swelling present to R LE   Posture    Posture Forward head position;Protracted shoulders;Kyphosis   Posture Comments Moderate sitting EOB and standing   Range of Motion (ROM)   ROM Comment Did not formally assess, demonstrates functional ROM with mobility   Strength (Manual Muscle Testing)   Strength Comments Did not formally assess, demonstrates functional strength with mobility but generally deconditioned with some decreased activity tolerance   Bed Mobility   Comment, (Bed Mobility) Requires modA to R LE only for sit to supine   Transfers   Comment, (Transfers) [place] for sit<>stand transfer with use of walker   Gait/Stairs (Locomotion)   Comment, (Gait/Stairs) Unable to initiate d/t BPs and dizziness   Balance   Balance Comments Independent sitting balance, CGA for standing balance with UE support from walker   Sensory Examination   Sensory Perception Comments Baseline neuropathy   Clinical Impression   Criteria for Skilled Therapeutic Intervention Yes, treatment indicated   PT Diagnosis (PT) impaired functional mobility   Influenced by the following impairments increased post-op pain, precautions, decreased R LE strength and ROM   Functional limitations due  to impairments impaired bed mobility, transfers and ambulation   Clinical Presentation (PT Evaluation Complexity) Stable/Uncomplicated   Clinical Presentation Rationale Per clinical judgment   Clinical Decision Making (Complexity) low complexity   Planned Therapy Interventions (PT) balance training;bed mobility training;gait training;home exercise program;stair training;strengthening;transfer training;progressive activity/exercise;risk factor education;home program guidelines   Anticipated Equipment Needs at Discharge (PT)   (has PT equipment)   Risk & Benefits of therapy have been explained evaluation/treatment results reviewed;care plan/treatment goals reviewed;risks/benefits reviewed;current/potential barriers reviewed   PT Total Evaluation Time   PT Eval, Low Complexity Minutes (65554) 8   Physical Therapy Goals   PT Frequency Daily   PT Predicted Duration/Target Date for Goal Attainment 06/03/23   PT: Bed Mobility Supervision/stand-by assist;Supine to/from sit;Within precautions   PT: Transfers Supervision/stand-by assist;Sit to/from stand;Bed to/from chair;Assistive device;Within precautions   PT: Gait Supervision/stand-by assist;Assistive device;100 feet   Therapeutic Procedure/Exercise   Ther. Procedure: strength, endurance, ROM, flexibillity Minutes (69053) 10   Symptoms Noted During/After Treatment fatigue;increased pain   Treatment Detail/Skilled Intervention PT: Issued and completed supine exercises on R x 10 for strength and circulation including AP, QS, GS, HS, heelslides, SAQ. Overall tolerated well, mild increase in pain noted   Therapeutic Activity   Therapeutic Activities: dynamic activities to improve functional performance Minutes (97270) 15   Symptoms Noted During/After Treatment Fatigue;Increased pain   Treatment Detail/Skilled Intervention PT: Sitting EOB upon arrival, agreeable to PT. Educated and demonstrated precautions for patient and visitors, verbalized understanding. Noted mild  dizziness sitting EOB, BP=94/53. Agreeable to try to stand and monitor. Completes sit<>stand transfer with Jessica using walker. In standing remains with some increased dizziness, BP=92/32. Decision made to sit and return to bed given BP with symptoms. Patient then with cues able to laterally scoot up towards HOB with SBA. Completes sit to supine transfer with Jessica to R LE only. Once back in bed educated onhalf bridge technique to adjust up. Patient then scoots up in bed with Jessica overall. Ended in bed with needs in reach. Once laying down patient immediately asleep as quite fatigued. Left with needs in reach and all lines attached.   PT Discharge Planning   PT Plan PT: Bed mobility with leg , transfers and initiate ambulation. One discipline to bring precaution handout   PT Discharge Recommendation (DC Rec)   (Defer to ortho)   PT Rationale for DC Rec PT: Recommend continued therapy to progress safety and independence with functional mobility prior to returning home   PT Brief overview of current status PT: Jessica for bed mobility and transfers with walker   Total Session Time   Timed Code Treatment Minutes 25   Total Session Time (sum of timed and untimed services) 33

## 2023-05-31 NOTE — BRIEF OP NOTE
Orthopaedic Surgery Brief Op-Note      Patient: Betty Tee  : 1940  Date of Service: 2023 10:19 AM    Pre-operative Diagnosis: Primary localized osteoarthritis of right hip [M16.11]  Post-operative Diagnosis: same    Procedure(s) Performed: Procedure(s):  ARTHROPLASTY, RIGHT HIP, TOTAL    Staff: Dr. Shannon  Assistants:   HONEY MICHELLE, RADHA Head MD    Anesthesia: General  EBL: 200 cc    Implants:   Implant Name Type Inv. Item Serial No.  Lot No. LRB No. Used Action   BONE CEMENT RESTRICTOR BARBOUR FEMORAL 30MM [id number] - [id number] Cement, Bone BONE CEMENT RESTRICTOR BARBOUR FEMORAL 30MM [id number]  [employer]  Right 1 Implanted   IMP SHELL SNR ACET R3 3H 48MM [id number] - [id number] Total Joint Component/Insert IMP SHELL SNR ACET R3 3H 48MM [id number]  [employer] [id number] Right 1 Implanted   IMP LINER S&N ACET R3 XLPE [id number] 0DEG [id number] - [id number] Total Joint Component/Insert IMP LINER S&N ACET R3 XLPE [id number] [id number]  [employer]  Right 1 Implanted   IMP SCR ACET SNN SPHERICAL HEAD 6.5X20MM [id number] - [id number] Metallic Hardware/[place] IMP SCR ACET SNN SPHERICAL HEAD 6.5X20MM [id number]  [employer] [id number] Right 1 Implanted   IMP SCR ACET SNN SPHERICAL HEAD 6.5X40MM [id number] - [id number] Metallic Hardware/[place] IMP SCR ACET SNN SPHERICAL HEAD 6.5X40MM [id number]  [employer] [id number] Right 1 Implanted   SMITH & NEPHEW SIZE 1H, 115MM PRMIARY FEMORAL COMPONENT    [employer] [id number] Right 1 Implanted   IMP HEAD FEMORAL SNR [place] 32MM +4 [id number] - [id number] Total Joint Component/Insert IMP HEAD FEMORAL SNR [place] 32MM +4 [id number]  [employer] [id number] Right 1 Implanted     Drains: none  Intra-op Labs/Cxs/Specimens: None  Complications: No apparent complications during procedure  Findings: Please see dictated operative note for details    Disposition: Stable to PACU, then admit to Orthopaedics    POST  OPERATIVE PLAN    Assessment/Plan: Betty Tee is a 83 year old female s/p Procedure(s):  ARTHROPLASTY, RIGHT HIP, TOTAL on 5/31/2023 with Dr. Shannon.    Activity: Up with assist and assistive devices as needed until independent. Posterior hip precautions to operative side x 3 months:  1) No hip flexion beyond 90 degrees  2) No adduction beyond midline  3) No internal rotation beyond neutral   Weight bearing status: WBAT  Antibiotics: Cefazolin x 24 hours  Diet: Begin with clear fluids and progress diet as tolerated. Bowel regimen. Anti-emetics PRN.    DVT prophylaxis:  mg x 4 weeks beginning POD 1   Elevation: Elevate heels off of bed on pillows   Wound Care: Tegaderm and alginate x 2 weeks   Drains: none  Pain management: Orals PRN, IV for breakthrough only  X-rays: AP Pelvis and Lateral operative hip in PACU.   Physical Therapy: Mobilization, ROM, ADL's, Posterior hip precautions.    Occupational Therapy: ADL's  Labs: Trend Hgb on POD #1, 2  Consults: PT, OT. Hospitalist, appreciate assistance in caring for this patient throughout the perioperative period    Future Appointments   Date Time Provider Department Center   5/31/2023  3:30 PM Yvonne Marcus, PT [facility] [place]   6/1/2023  8:15 AM Suzanne Meehan, PT [facility] [place]   6/1/2023  9:30 AM Lydia Wiggins, STEVENR [facility] [place]   6/1/2023  1:00 PM Suzanne Meehan, PT [facility] [place]   6/16/2023  1:00 PM Destinee Panchal PA-C [facility]   7/20/2023 10:20 AM Thomas Shannon MD [facility]   7/21/2023  2:00 PM Zulma Lopez MD [facility]   8/1/2023  2:00 PM Ilene Tristan MD [facility]   9/7/2023 12:45 PM  LAB [facility]   9/7/2023  1:00 PM [facility]   9/7/2023  2:00 PM Radha Rosa MD [facility]   11/16/2023 11:30 AM  [facility]   11/16/2023 12:00 PM Maryjane Tillman MD [facility]   4/16/2024  2:00 PM Ilene Tristan MD [facility]       Disposition: Pending progress with therapies,  pain control on orals, and medical stability, anticipate discharge to TCU ([facility]) on POD #1-2.    I assisted with positioning, prepping and draping, and closure.      Suzanne Yuen PA-C 5/31/2023 10:19 AM  Orthopedic Surgery

## 2023-05-31 NOTE — INTERVAL H&P NOTE
"I have reviewed the surgical (or preoperative) H&P that is linked to this encounter, and examined the patient. There are no significant changes    Clinical Conditions Present on Arrival:  Clinically Significant Risk Factors Present on Admission                 # Drug Induced Platelet Defect: home medication list includes an antiplatelet medication  # DMII: A1C = 7.3 % (Ref range: <5.7 %) within past 6 months  # Overweight: Estimated body mass index is 29.04 kg/m  as calculated from the following:    Height as of this encounter: 1.664 m (5' 5.51\").    Weight as of this encounter: 80.4 kg (177 lb 4 oz).       "

## 2023-05-31 NOTE — OP NOTE
OPERATIVE REPORT    DATE OF SERVICE: 5/31/23    SURGEON: Thomas Shannon MD.    ASSISTANT(S):  Tom Head MD and Suzanne Yuen PA-C    PREOPERATIVE DIAGNOSIS:  Osteoarthritis    POSTOPERATIVE DIAGNOSIS:  Osteoarthritis    OPERATION PERFORMED:   Right total hip arthroplasty    IMPLANTS:    Implant Name Type Inv. Item Serial No.  Lot No. LRB No. Used Action   BONE CEMENT RESTRICTOR BARBOUR FEMORAL 30MM [id number] - [id number] Cement, Bone BONE CEMENT RESTRICTOR BARBOUR FEMORAL 30MM [id number]  [employer]  Right 1 Implanted   IMP SHELL SNR ACET R3 3H 48MM [id number] - [id number] Total Joint Component/Insert IMP SHELL SNR ACET R3 3H 48MM [id number]  [employer] [id number] Right 1 Implanted   IMP LINER S&N ACET R3 XLPE [id number] 0DEG [id number] - [id number] Total Joint Component/Insert IMP LINER S&N ACET R3 XLPE [id number] 0DEG [id number]  [employer]  Right 1 Implanted   IMP SCR ACET SNN SPHERICAL HEAD 6.5X20MM [id number] - [id number] Metallic Hardware/[place] IMP SCR ACET SNN SPHERICAL HEAD 6.5X20MM [id number]  [employer] [id number] Right 1 Implanted   IMP SCR ACET SNN SPHERICAL HEAD 6.5X40MM [id number] - [id number] Metallic Hardware/[place] IMP SCR ACET SNN SPHERICAL HEAD 6.5X40MM [id number]  [employer] [id number] Right 1 Implanted   SMITH & NEPHEW SIZE 1H, 115MM PRMIARY FEMORAL COMPONENT    [employer] [id number] Right 1 Implanted   IMP HEAD FEMORAL SNR [place] 32MM +4 [id number] - [id number] Total Joint Component/Insert IMP HEAD FEMORAL SNR [place] 32MM +4 [id number]  [employer] [id number] Right 1 Implanted       ANESTHETIC: spinal     OPERATIVE FINDINGS:  End stage arthrosis of the hip. Abductor tendinosis with a broad area of partial thickness tearing and a 1 x 1 cm area of full thickness tearing at the abductor attachment.     BLOOD LOSS about 200 ml     COMPLICATIONS:  None apparent    OPERATIVE INDICATIONS:  The patient has a long history of debilitating pain secondary to  ostearthritis of the hip.  Despite comprehensive non-operative management these symptoms continued to interfere with activities of daily living.  After discussion of further treatment options including the risks and benefits that patient elected to proceed with a total hip.    DESCRIPTION OF THE PROCEDURE:  The patient was identified in the preoperative holding area.  The consent form including the risks and benefits were reviewed with the patient.  The operative limb was identified and marked.  The patient was brought back to the operating room and placed supine on the operating table.  An anesthetic was induced by the anesthesia team.   The patient was placed in the lateral decubitus position and prepped and draped in the normal standard fashion for a hip replacement.  A time-out was called.  Antibiotics were given.  We utilized an approximately 15 cm curvilinear incision, centered on the vastus ridge, and performed a standard posterior approach to the hip.  The tensor fascia was split.  A small portion of gluteus jatinder was split in line with its fibers.  The sciatic nerve was palpated.  The east-west retractor was placed.  The posterior border of gluteus medius was exposed and retracted.  The tendon of piriformis and that of the obturators was released from their attachments.  A trapdoor posterior capsulotomy was performed.  The hip was dislocated.  The lesser trochanter was exposed.  A ruler was used to measure and electrocautery was used to savana our neck cut as preoperatively templated.  The head was measured with a caliper and found to be 45 mm.  This measurement was used to choose our first reamer.  The neck cut was re-measured. The femur was elevated.  A Hohmann was placed over the anterior rim of the acetabulum and the femur was subluxed anterior.  A split was made in the inferior capsule.  The transverse acetabular ligament was left intact and used a guide for the anterversion of the acetabular  "component.  Circumferential retractors were placed.  We began reaming and went up by two until sufficient contact was made with the acetabular rim.  We then went up by one millimeter for a one millimeter press-fit.  We were within one size of our preoperative plan.   A trail was placed.  It had an excellent press fit.  We then placed out final component in 40 degrees of inclination and approximately 20 degrees of anteversion, parallel to the transverse ligament.  The press fit was excellent.  Screws were placed for additional initial fixation.  A flat liner was then placed. It locked into place.  Attention was turned to the femur.  Retractors were placed to elevated the proximal femur and to protect the tendon of gluteus medius.  Remaining lateral neck was removed and the piriformis fossa was cleared of soft-tissue.  A box osteotome and canal finder were used to prepare for broaching.  A sharp broach was used to lateralize slightly.  We then broached up sequentially to a size 1.  It was rotationally stable .  Preoperatively the patient had templated to a high offset stem.  The high offset stem appropriately tensioned the abductors.  We trialed the following femoral heads: 0 and +4.  The +4 appropriately tensioned the abductors and clinically equalized the leg-lengths.  The stability exam was excellent.  The hip was stable and there was no impingement posteriorly with hyper-extension and maximal external rotation.  With full extension, the knee could be fixed to bring the foot nearly to the buttock.  With the hip in ninety degrees of flexion and neutral rotation there was greater than 60 degrees of internal rotation before subluxation.  There appropriate movement with a \"polly\" test.  Happy with our stability exam, the final implant was placed in approximately twenty degrees of anteversion.  It sat within 2 mm of the broach.  We then trailed with a +4 head.  The stability exam was identical.  We then placed the " final head on a clean, dry neck and impacted it into place. The hip was reduced after directly visualizing the entire acetabulum.  The wound was then irrigated.  The posterior capsule and short external rotators were sutured to the greater trochanter with non-absorbable suture through bone tunnels.The fascia was closed with interrupted Vicryl, the dermis with interrupted Vicryl, and skin with running monocryl, Dermabond and steri-strips.  At the end of the procedure the sponge and needle counts were correct times two.  The patient tolerated the procedure well and returned to the PAR extubated and stable.    POSTOPERATIVE PLAN:  1. Weight bearing as tolerated  2. Standard posterior hip precautions  3. DVT prophylaxis   4. 24 hours of prophylactic antibiotics  5. Follow-up:  Wound clinic in 2 weeks and with Shiva in clinic in 6 weeks for x-rays and a rehabilitation check.

## 2023-05-31 NOTE — PROGRESS NOTES
PACU to Inpatient Nursing Handoff    Patient Betty Tee is a 83 year old female who speaks English.   Procedure Procedure(s):  ARTHROPLASTY, RIGHT HIP, TOTAL   Surgeon(s) Primary: Thomas Shannon MD  Assisting: Suzanne Yuen PA-C  Resident - Assisting: Tom Head MD     Allergies   Allergen Reactions     Amoxicillin-Pot Clavulanate Nausea and Vomiting     Amoxicillin-Pot Clavulanate      Codeine Nausea and Vomiting     PN: LW Reaction: HIVES     Penicillins Nausea and Vomiting     PN: LW Reaction: GI Upset     Phenobarbital Itching     Seasonal Allergies        Isolation  No active isolations     Past Medical History   has a past medical history of Alcohol abuse, in remission, Allergic rhinitis, cause unspecified, Antiplatelet or antithrombotic long-term use, Atrial fibrillation (H), Cardiomegaly, Chest pain, unspecified, Congestive heart failure (H), Depressive disorder, Diabetes (H), Disorder of bone and cartilage, unspecified, Diverticulosis of colon (without mention of hemorrhage), Essential hypertension, benign, Follicular bronchiolitis (H), Gastro-oesophageal reflux disease, ILD (interstitial lung disease) (H), Insomnia, unspecified, Irregular heart beat, Lumbago (07/2009), Major depressive disorder, recurrent episode, moderate (H), Obstructive sleep apnea, Osteoarthrosis, unspecified whether generalized or localized, unspecified site, Sjogren's syndrome (H), Sleep apnea, and Tobacco use disorder.    Anesthesia Spinal   Dermatome Level     Preop Meds acetaminophen (Tylenol) - time given: 0645  celecoxib (Celebrex) - time given: 0645  TXA - time given: 0645   Nerve block None     Intraop Meds dexmedetomidine (Precedex): 12 mcg total  ondansetron (Zofran): last given at 1005  phenylephrine, ephedrine, 2 gm Ancef at 0752   Local Meds Yes - Local Cocktail (morphine, ropivacaine, epinephrine, Toradol)   Antibiotics cefazolin (Ancef) - last given at 0752     Pain Patient Currently in  Pain: yes   PACU meds  fentanyl (Sublimaze): 75 mcg (total dose) last given at 1114   oxycodone (Roxicodone): 5 mg (total dose) last given at 1133    PCA / epidural No   Capnography     Telemetry ECG Rhythm: Sinus bradycardia   Inpatient Telemetry Monitor Ordered? No        Labs Glucose Lab Results   Component Value Date     05/31/2023     08/23/2022     06/04/2021       Hgb Lab Results   Component Value Date    HGB 12.8 05/10/2023    HGB 13.4 06/04/2021       INR Lab Results   Component Value Date    INR 1.43 11/10/2022    INR 1.36 03/03/2020      PACU Imaging Completed     Wound/Incision Incision/Surgical Site 05/31/23 Right Hip (Active)   Incision Assessment UTV 05/31/23 1022   Incision Drainage Amount None 05/31/23 1022   Incision Care Adhesive strips applied;Barrier Film 05/31/23 1002   Dressing Intervention Clean, dry, intact 05/31/23 1022   Number of days: 0      CMS  See flowsheets      Equipment abductor pillow   Other LDA       IV Access Peripheral IV 05/31/23 Left Hand (Active)   Site Assessment WDL 05/31/23 1022   Line Status Infusing 05/31/23 1022   Dressing Transparent 05/31/23 1022   Dressing Status clean;dry;intact 05/31/23 1022   Dressing Intervention New dressing  05/31/23 0656   Phlebitis Scale 0-->no symptoms 05/31/23 1022   Infiltration? no 05/31/23 1022   Number of days: 0      Blood Products Not applicable  mL   Intake/Output Date 05/31/23 0700 - 06/01/23 0659   Shift 1223-0307 9776-8304 1531-5014 24 Hour Total   INTAKE   P.O. 240   240   I.V. 800   800   Shift Total(mL/kg) 1040(12.94)   1040(12.94)   OUTPUT   Shift Total(mL/kg)       Weight (kg) 80.4 80.4 80.4 80.4      Drains / Kilpatrick     Time of void PreOp Time of Void Prior to Procedure: 0600 (05/31/23 0653)    PostOp      Diapered? No   Bladder Scan Bladder Scan Volume (mL): 313 ml (05/31/23 1128)    mL (05/31/23 1128)  crackers and water     Vitals    B/P: 103/77  T: 97.5  F (36.4  C)    Temp src:  Axillary  P:  Pulse: (!) 48 (05/31/23 1115)          R: 17  O2:  SpO2: 96 %    O2 Device: Nasal cannula (05/31/23 1114)    Oxygen Delivery: 2 LPM (05/31/23 1114)         Family/support present friend Nghia kendall  walker, 1 white belongings bag, CPAP   Patient transported on cart   DC meds/scripts (obs/outpt) Not applicable   Inpatient Pain Meds Released? Yes       Special needs/considerations None   Tasks needing completion Insulin drip infusing, started at 2.5 units per hour at 1054, needs to be re-checked at 1 hour and adjusted per algorithm, recheck of BG at 1135 196       Tanesha Dias, RN  [facility] [id number]

## 2023-05-31 NOTE — ANESTHESIA CARE TRANSFER NOTE
Patient: Betty Tee    Procedure: Procedure(s):  ARTHROPLASTY, RIGHT HIP, TOTAL       Diagnosis: Primary localized osteoarthritis of right hip [M16.11]  Diagnosis Additional Information: No value filed.    Anesthesia Type:   Spinal     Note:    Oropharynx: oropharynx clear of all foreign objects and spontaneously breathing  Level of Consciousness: drowsy  Oxygen Supplementation: face mask  Level of Supplemental Oxygen (L/min / FiO2): 6  Independent Airway: airway patency satisfactory and stable  Dentition: dentition unchanged  Vital Signs Stable: post-procedure vital signs reviewed and stable    Patient transferred to: PACU  Comments: VSS. Breathing spontaneously at a regular rate with adequate tidal volumes and maintaining O2 sats on 6L. Denies nausea or pain. No apparent complications from anesthesia.     Cy Dean MD  Anesthesia CA-3  [phone]    Handoff Report: Identifed the Patient, Identified the Reponsible Provider, Reviewed the pertinent medical history, Discussed the surgical course, Reviewed Intra-OP anesthesia mangement and issues during anesthesia, Set expectations for post-procedure period and Allowed opportunity for questions and acknowledgement of understanding      Vitals:  Vitals Value Taken Time   /64 05/31/23 1022   Temp     Pulse 55 05/31/23 1028   Resp 10 05/31/23 1028   SpO2 98 % 05/31/23 1028   Vitals shown include unvalidated device data.    Electronically Signed By: Anum Dean MD  May 31, 2023  10:29 AM

## 2023-05-31 NOTE — PLAN OF CARE
"  VS: BP (!) 146/66 (BP Location: Right arm)   Pulse 52   Temp (!) 95.2  F (35.1  C) (Oral)   Resp 15   Ht 1.664 m (5' 5.51\")   Wt 80.4 kg (177 lb 4 oz)   SpO2 98%   BMI 29.04 kg/m       O2: 2 LPM via nasal cannula.   CAPNO on   Output: Voids without difficulty in bedside commode   Last BM: 5/30; active bowel sounds. Passing flatus   Activity: Ax1 with gait belt, walker   Skin: L chest abrasion. R hip surgical incision   Pain: C/o R hip surgical incision pain and R groin spasms managed with repositioning, cold packs, and PRN medications   CMS: Baseline neuropathy in BLE.   Spinal has worn off   Dressing: R hip surgical incision- CDI   Diet: Regular diet, thin liquids, takes pills whole   LDA: L PIV infusing LR at 100 mL/hr and Insulin drip    Equipment: IV pole, CAPNO, personal walker, and personal belongings   Plan: Continue plan of care   Additional Info: A&Ox4. Denies headache, chest pain, SOB. Uses call light appropriately and makes needs known.        "

## 2023-05-31 NOTE — ANESTHESIA PROCEDURE NOTES
"Intrathecal injection Procedure Note    Pre-Procedure   Staff -        Anesthesiologist:  Jayesh Brooke DO       Resident/Fellow: Anum Dean MD       Performed By: resident       Location: OR       Procedure Start/Stop Times: 5/31/2023 7:40 AM and 5/31/2023 7:50 AM       Pre-Anesthestic Checklist: patient identified, IV checked, risks and benefits discussed, informed consent, monitors and equipment checked, pre-op evaluation, at physician/surgeon's request and post-op pain management  Timeout:       Correct Patient: Yes        Correct Procedure: Yes        Correct Site: Yes        Correct Position: Yes   Procedure Documentation  Procedure: intrathecal injection       Patient Position: sitting       Skin prep: Chloraprep       Insertion Site: L3-4. (midline approach).       Needle Gauge: 25.        Needle Length (Inches): 3.5        Spinal Needle Type: Pencan       Introducer used       Introducer: 20 G       # of attempts: 2 and  # of redirects:  1    Assessment/Narrative         Paresthesias: No.       CSF fluid: clear.       Opening pressure was cmH2O while  Sitting.      Medication(s) Administered   0.75% Hyperbaric Bupivacaine (Intrathecal) - Intrathecal   1.6 mL - 5/31/2023 7:40:00 AM  Medication Administration Time: 5/31/2023 7:40 AM      FOR [facility] ([facility]) ONLY:   Pain Team Contact information: please page the Pain Team Via [employer]. Search \"Pain\". During daytime hours, please page the attending first. At night please page the resident first.      "

## 2023-05-31 NOTE — TELEPHONE ENCOUNTER
[facility] Call Center    Phone Message    May a detailed message be left on voicemail: yes     Reason for Call: Order(s): Other:   Reason for requested: TCU orders  Date needed: asap  Provider name: Thomas Varela is faxing TCU orders to Dr. Shannon. Need signed and faxed back asap.  Fax# to [place]: [phone]      Action Taken: Message routed to:  [facility] ([facility]): Orthopedics    Travel Screening: Not Applicable

## 2023-05-31 NOTE — PROVIDER NOTIFICATION
537 O.C. POD 0 RTHA report Oxycodone not helping with pain. Pt already taking oxycodone before surgery. Pt asking if we can try a different pain medication. pls advise. thanks Rani [phone]

## 2023-05-31 NOTE — CONSULTS
"[facility]  Consult Note - Hospitalist Service, GOLD TEAM 21  Date of Admission:  5/31/2023  Consult Requested by: Orthopaedics, Dr. Shannon  Reason for Consult: Medical Co-Management    Assessment & Plan   Betty Tee is a 83 year old female admitted on 5/31/2023 after right total hip arthoplasty.     #S/p right total hip arthoplasty  #Osteoarthrtiis  -S/p right total hip arthoplasty on 5/31 with orthopaedics    #Type 2 DM -   -requiring insulin drip after OR  -Transition subQ insulin this afternoon  -Repeat A1c    #HFpEf -  Follows with [facility], Dr. Rosa most recent EF 55-60%  Plan:  > Continue torsemide 40 mg qAM, 20 mg qPM - resume on 6/1     #Atrial Fibrillation s/p watchman procedure - Continue metoprolol    #Gout - allopurinol  #HLD - atorvastatin  #ILD, Sjogrens follicular bronchiolitis - Breo, montelukast, prednisone 5 mg, azithromycin 250 mg,   #Depression - lexapro  #Sjogrens syndrome - Plaquenil  #ANGELICA - CPAP       Clinically Significant Risk Factors Present on Admission                # Drug Induced Platelet Defect: home medication list includes an antiplatelet medication   # Hypertension: Noted on problem list     # DMII: A1C = 7.3 % (Ref range: <5.7 %) within past 6 months    # Overweight: Estimated body mass index is 29.04 kg/m  as calculated from the following:    Height as of this encounter: 1.664 m (5' 5.51\").    Weight as of this encounter: 80.4 kg (177 lb 4 oz).            Edmundo Rawls MD  Hospitalist Service, GOLD TEAM 21  Securely message with [employer] (more info)  Text page via [facility] Paging/Directory   See signed in provider for up to date coverage information  ______________________________________________________________________    Chief Complaint   S/p right total hip arthroplasty    History is obtained from the patient    History of Present Illness   Betty Tee is a 83 year old female with HFpEF, ILD secondary to " sjogrens, type 2 DM, osteoarthritis who presents after right total hip arthoplasty. She reports pain in her right hip and spasming which is severe. She states she has not had this prior.She has been taking steroids for many years but does not need sick day stress dose dosing. No dizziness or light headedness. No chest pain. No shortness of breath. No nausea or vomiting.       Past Medical History    Past Medical History:   Diagnosis Date     Alcohol abuse, in remission      Allergic rhinitis, cause unspecified     allegra helps when she takes it     Antiplatelet or antithrombotic long-term use      Atrial fibrillation (H)     in hosp in 11/11 after surgery w/ fluid overload     Cardiomegaly     LVH on stress echo- cardiac w/u at [facility] ER- neg CT scan for PE, neg stress echo in 8/06     Chest pain, unspecified      Congestive heart failure (H)      Depressive disorder      Diabetes (H)      Disorder of bone and cartilage, unspecified     osteopenia (had been on prempro), improved on 6/06 dexa, stable dexa 11/10     Diverticulosis of colon (without mention of hemorrhage)     last episode yrs ago     Essential hypertension, benign      Follicular bronchiolitis (H)     associated with Sjogrens, dx by chest CT showing mosaic attenuation and air trapping     Gastro-oesophageal reflux disease      ILD (interstitial lung disease) (H)     associated with Sjogrens, also has mildly elevated IgG4, first noted on chest CT 2015 (mild changes) and also has small airways disease; ILD improved on follow up chest CT 2018.     Insomnia, unspecified     weaned off clonazepam     Irregular heart beat      Lumbago 07/2009    MRI with DJD, now seeing Dr. Cain for sciatic sx's     Major depressive disorder, recurrent episode, moderate (H)      Obstructive sleep apnea     uses cpap     Osteoarthrosis, unspecified whether generalized or localized, unspecified site      Sjogren's syndrome (H)     + RG and SSA and lip bx     Sleep apnea      uses a cpap machine     Tobacco use disorder     chantix in 9/07, started again in 6/08, working       Past Surgical History   Past Surgical History:   Procedure Laterality Date     APPENDECTOMY       BACK SURGERY  1962     BACK SURGERY  1962     BIOPSY BREAST  09/27/2002    Biopsy Left Breast     BIOPSY BREAST Left 09/27/2002     BREAST SURGERY       CARDIAC SURGERY       CARDIAC SURGERY       CHOLECYSTECTOMY  1990's?     CHOLECYSTECTOMY       COLECTOMY LEFT  11/07/2011    Procedure:COLECTOMY LEFT; Laparoscopic mobilization of splenic flexture, sigmoid colectomy, coloprotoscopy, loop illeostomy; Surgeon:CK SIDDIQI; Location:[facility] OR     COLECTOMY LEFT  11/07/2011     COLONOSCOPY  1990,s     COLONOSCOPY N/A 5/3/2022    Procedure: COLONOSCOPY;  Surgeon: Guru Winsome Dias MD;  Location: [facility]     CV LEFT ATRIAL APPENDAGE CLOSURE N/A 9/26/2022    Procedure: Left Atrial Appendage Closure;  Surgeon: Uzair Crews MD;  Location:  [facility]     ENT SURGERY       EYE SURGERY  2012     FLEXIBLE SIGMOIDOSCOPY  11/03/2011     HYSTERECTOMY TOTAL ABDOMINAL, BILATERAL SALPINGO-OOPHORECTOMY, COMBINED  11/07/2011    Procedure:COMBINED HYSTERECTOMY TOTAL ABDOMINAL, BILATERAL SALPINGO-OOPHORECTOMY; total abdominal hysterectomy, bilateral salpingo-oophorectomy; Surgeon:ALETA MANUEL; Location:[facility] OR     HYSTERECTOMY TOTAL ABDOMINAL, BILATERAL SALPINGO-OOPHORECTOMY, COMBINED  11/07/2011     ILEOSTOMY  02/01/2012    takedown loop ileostomy      INSERT STENT URETER  11/07/2011    Procedure:INSERT STENT URETER; Placement of Bilateral Ureteral Stents ; Surgeon:PRANEETH BRYANT; Location:[facility] OR     SIGMOIDECTOMY  02/01/2012    Dr. Siddiqi, Sigmoidectomy for diverticular abscess, iliostomy afterwards until repair     SIGMOIDOSCOPY FLEXIBLE  11/03/2011    Procedure:SIGMOIDOSCOPY FLEXIBLE; Flexible Sigmoidoscopy; Surgeon:CK SIDDIQI; Location:[facility] OR     TAKEDOWN ILEOSTOMY  02/01/2012     Procedure:TAKEDOWN ILEOSTOMY; Takedown Loop Ileostomy ; Surgeon:CK CASTANEDA; Location:[facility] OR     URETERAL STENT PLACEMENT  11/07/2011     [facility] APPENDECTOMY  1970's?     [facility] NONSPECIFIC PROCEDURE  11/2005    exploratory abd lap, adhesions, resolved RLQ pain, diverticulitis episodes       Medications   I have reviewed this patient's current medications  Medications Prior to Admission   Medication Sig Dispense Refill Last Dose     acetaminophen (TYLENOL) 500 MG tablet Take 1,000 mg by mouth every 8 hours as needed (max 6 tablets/24 hours, 2 tablets/dose)   5/30/2023     acyclovir (ZOVIRAX) 400 MG tablet Take 1 tablet (400 mg) by mouth every 8 hours For a couple days 15 tablet 2 Past Month     albuterol (PROAIR HFA/PROVENTIL HFA/VENTOLIN HFA) 108 (90 Base) MCG/ACT inhaler Inhale 2 puffs into the lungs every 6 hours 18 g 11 Past Month     alendronate (FOSAMAX) 70 MG tablet Take 1 tablet (70 mg) by mouth every 7 days 12 tablet 1 5/29/2023     allopurinol (ZYLOPRIM) 100 MG tablet Take 1 tablet (100 mg) by mouth daily 90 tablet 1 5/30/2023     anakinra (KINERET) 100 MG/0.67ML SOSY injection 100 mg every day x 3 days as needed for flare ups 6.7 mL 3 More than a month     atorvastatin (LIPITOR) 10 MG tablet TAKE 1/2 TABLET BY MOUTH EVERY DAY 45 tablet 3 5/30/2023     azithromycin (ZITHROMAX) 250 MG tablet TAKE 1 TABLET BY MOUTH EVERY DAY 30 tablet 5 5/30/2023     BREO ELLIPTA 100-25 MCG/ACT inhaler TAKE 1 PUFF BY MOUTH EVERY DAY 1 each 11 5/30/2023     cevimeline (EVOXAC) 30 MG capsule Take 1 capsule (30 mg) by mouth 3 times daily (Patient taking differently: Take 30 mg by mouth 3 times daily as needed) 90 capsule 11 5/30/2023     Cholecalciferol (VITAMIN D3) 50 MCG (2000 UT) CAPS TAKE 100 MCG BY MOUTH DAILY (TAKE 2 TABLET (50 MCG) BY MOUTH DAILY - ORAL) 180 capsule 0 Past Month     cyanocobalamin (VITAMIN B-12) 1000 MCG tablet Take 1 tablet (1,000 mcg) by mouth daily 30 tablet 1 Past Month     escitalopram (LEXAPRO) 20 MG  tablet TAKE 1 TABLET BY MOUTH EVERY DAY 90 tablet 1 5/31/2023 at 0430     ferrous gluconate (FERGON) 324 (38 Fe) MG tablet TAKE 1 TABLET (324 MG) BY MOUTH DAILY (WITH BREAKFAST) 90 tablet 1 5/30/2023     hydroxychloroquine (PLAQUENIL) 200 MG tablet Take 2 tablets (400 mg) by mouth daily Get annual eye exams for hydroxychloroquine (Plaquenil) monitoring and fax to [phone] 180 tablet 3 5/30/2023     insulin lispro (HUMALOG KWIKPEN) 100 UNIT/ML (1 unit dial) KWIKPEN Inject 8 Units Subcutaneous 3 times daily (before meals)   5/30/2023     KLOR-CON 20 MEQ CR tablet TAKE 2 TABLETS (40 MEQ) BY MOUTH EVERY MORNING AND 1 TABLET (20 MEQ) EVERY EVENING. 270 tablet 3 5/30/2023     loratadine (CLARITIN) 10 MG tablet TAKE 1 TABLET BY MOUTH EVERY DAY 90 tablet 3 5/30/2023     methocarbamol (ROBAXIN) 750 MG tablet Take 1 tablet (750 mg) by mouth 3 times daily as needed for muscle spasms 90 tablet 3 Past Month     metoprolol succinate ER (TOPROL XL) 50 MG 24 hr tablet Take 1 tablet (50 mg) by mouth 2 times daily 180 tablet 3 5/31/2023     montelukast (SINGULAIR) 10 MG tablet TAKE 1 TABLET BY MOUTH EVERYDAY AT BEDTIME 90 tablet 1 5/30/2023 at 2230     oxyCODONE-acetaminophen (PERCOCET) 7.5-325 MG per tablet Take 1 tablet by mouth 2 times daily as needed for severe pain With at least 4 hours between doses. Ok to fill and start 4/19/23 60 tablet 0 5/30/2023     pantoprazole (PROTONIX) 40 MG EC tablet Take 1 tablet (40 mg) by mouth daily (replaces famotidine- should stop taking famotidine) 90 tablet 3 5/31/2023 at 0430     predniSONE (DELTASONE) 5 MG tablet Take 1 tablet (5 mg) by mouth daily 90 tablet 1 5/31/2023 at 0430     torsemide (DEMADEX) 20 MG tablet Take 2 tablets (40 mg) by mouth every morning AND 1 tablet (20 mg) every evening. 270 tablet 3 5/30/2023     traZODone (DESYREL) 50 MG tablet TAKE 3 TABLETS (150 MG) BY MOUTH NIGHTLY AS NEEDED FOR SLEEP 270 tablet 0 5/30/2023 at 2230     ACCU-CHEK SHANNON PLUS test strip USE TO  TEST BLOOD SUGARS 3 TIMES DAILY 300 strip 5      acyclovir (ZOVIRAX) 5 % external ointment Apply topically as needed (6 times day PRN for outbreaks) As needed for outbreaks 15 g 3      aspirin (ASA) 81 MG EC tablet Take 1 tablet (81 mg) by mouth daily (Patient not taking: Reported on 2023) 90 tablet 1 2023     BD PEN NEEDLE JANE 2ND GEN 32G X 4 MM miscellaneous USE TO TEST 4 TIMES A  each 1      blood glucose (NO BRAND SPECIFIED) lancets standard Use to test blood sugar 3 times daily or as directed 100 lancet 3      COMPOUNDED NON-CONTROLLED SUBSTANCE (CMPD RX) - PHARMACY TO MIX COMPOUNDED MEDICATION Estriol 1 mg/g Apply small amount to finger and apply to inside vagina daily for 2 weeks then twice weekly Route: vaginally Dispense 30 grams 11 refills 30 g 11      fluticasone (FLONASE) 50 MCG/ACT nasal spray SPRAY 1-2 SPRAYS INTO BOTH NOSTRILS DAILY AS NEEDED FOR ALLERGIES 16 mL 11      ketoconazole (NIZORAL) 2 % external cream Apply topically 2 times daily as needed for itching 60 g 1      lidocaine (LIDODERM) 5 % patch APPLY PATCH TO PAINFUL AREA FOR UP TO 12 H WITHIN A 24 H PERIOD. REMOVE AFTER 12 HOURS. (Patient taking differently: Apply patch to painful area for up to 12 h within a 24 h period.  Remove after 12 hours.) 30 patch 2      OZEMPIC, 1 MG/DOSE, 4 MG/3ML pen INJECT 1 MG SUBCUTANEOUS ONCE A WEEK 9 mL 1 23          Social History   I have reviewed this patient's social history and updated it with pertinent information if needed.  Social History     Tobacco Use     Smoking status: Former     Packs/day: 0.50     Types: Cigarettes     Start date:      Quit date: 2011     Years since quittin.8     Smokeless tobacco: Never     Tobacco comments:      ppd   Vaping Use     Vaping status: Never Used   Substance Use Topics     Alcohol use: No     Alcohol/week: 0.0 standard drinks of alcohol     Comment: In recovery beginning      Drug use: No       Allergies   Allergies    Allergen Reactions     Amoxicillin-Pot Clavulanate Nausea and Vomiting     Amoxicillin-Pot Clavulanate      Codeine Nausea and Vomiting     PN: LW Reaction: HIVES     Penicillins Nausea and Vomiting     PN: LW Reaction: GI Upset     Phenobarbital Itching     Seasonal Allergies         Physical Exam   Vital Signs: Temp: 97.6  F (36.4  C) Temp src: Oral BP: 135/50 Pulse: 51   Resp: 17 SpO2: 96 % O2 Device: Nasal cannula Oxygen Delivery: 2 LPM  Weight: 177 lbs 4 oz    Constitutional: alert, oriented, lying in bed uncomfortable due to pain  Eyes: no icterus  ENT: no elevated JVP  Respiratory: diminshed breath sounds at bases, no wheezing  Cardiovascular: RRR  GI: soft, non-tender, non-distended, bowel sounds present  Skin: right hip incision with dressing in place  Musculoskeletal: dorsal and plantar flexion of bilateral lower extremities  Neurologic: diminshed sensation in bilateral lower extremities     Medical Decision Making             Data     I have personally reviewed the following data over the past 24 hrs:    Procal: N/A CRP: N/A Lactic Acid: 2.0

## 2023-05-31 NOTE — ANESTHESIA POSTPROCEDURE EVALUATION
Patient: Betty Tee    Procedure: Procedure(s):  ARTHROPLASTY, RIGHT HIP, TOTAL       Anesthesia Type:  Spinal    Note:  Disposition: Admission   Postop Pain Control: Uneventful            Sign Out: Well controlled pain   PONV: No   Neuro/Psych: Uneventful            Sign Out: Acceptable/Baseline neuro status   Airway/Respiratory: Uneventful            Sign Out: Acceptable/Baseline resp. status   CV/Hemodynamics: Uneventful            Sign Out: Acceptable CV status; No obvious hypovolemia; No obvious fluid overload   Other NRE: NONE   DID A NON-ROUTINE EVENT OCCUR? No           Last vitals:  Vitals Value Taken Time   /59 05/31/23 1145   Temp 36.4  C (97.5  F) 05/31/23 1145   Pulse 48 05/31/23 1151   Resp 13 05/31/23 1151   SpO2 98 % 05/31/23 1151   Vitals shown include unvalidated device data.    Electronically Signed By: Jayesh Brooke DO  May 31, 2023  11:58 AM

## 2023-06-01 ENCOUNTER — MEDICAL CORRESPONDENCE (OUTPATIENT)
Dept: ORTHOPEDICS | Facility: CLINIC | Age: 83
End: 2023-06-01

## 2023-06-01 ENCOUNTER — APPOINTMENT (OUTPATIENT)
Dept: PHYSICAL THERAPY | Facility: CLINIC | Age: 83
End: 2023-06-01
Attending: ORTHOPAEDIC SURGERY
Payer: MEDICARE

## 2023-06-01 LAB
ANION GAP SERPL CALCULATED.3IONS-SCNC: 9 MMOL/L (ref 7–15)
BUN SERPL-MCNC: 13.8 MG/DL (ref 8–23)
CALCIUM SERPL-MCNC: 9.1 MG/DL (ref 8.8–10.2)
CHLORIDE SERPL-SCNC: 106 MMOL/L (ref 98–107)
CREAT SERPL-MCNC: 0.73 MG/DL (ref 0.51–0.95)
DEPRECATED HCO3 PLAS-SCNC: 23 MMOL/L (ref 22–29)
ERYTHROCYTE [DISTWIDTH] IN BLOOD BY AUTOMATED COUNT: 14.5 % (ref 10–15)
GFR SERPL CREATININE-BSD FRML MDRD: 81 ML/MIN/1.73M2
GLUCOSE BLDC GLUCOMTR-MCNC: 198 MG/DL (ref 70–99)
GLUCOSE BLDC GLUCOMTR-MCNC: 218 MG/DL (ref 70–99)
GLUCOSE BLDC GLUCOMTR-MCNC: 238 MG/DL (ref 70–99)
GLUCOSE SERPL-MCNC: 215 MG/DL (ref 70–99)
HCT VFR BLD AUTO: 31 % (ref 35–47)
HGB BLD-MCNC: 10.1 G/DL (ref 11.7–15.7)
LACTATE SERPL-SCNC: 1.2 MMOL/L (ref 0.7–2)
MAGNESIUM SERPL-MCNC: 2 MG/DL (ref 1.7–2.3)
MCH RBC QN AUTO: 30.2 PG (ref 26.5–33)
MCHC RBC AUTO-ENTMCNC: 32.6 G/DL (ref 31.5–36.5)
MCV RBC AUTO: 93 FL (ref 78–100)
PHOSPHATE SERPL-MCNC: 3.7 MG/DL (ref 2.5–4.5)
PLATELET # BLD AUTO: 140 10E3/UL (ref 150–450)
POTASSIUM SERPL-SCNC: 4.3 MMOL/L (ref 3.4–5.3)
RBC # BLD AUTO: 3.34 10E6/UL (ref 3.8–5.2)
SODIUM SERPL-SCNC: 138 MMOL/L (ref 136–145)
WBC # BLD AUTO: 9.2 10E3/UL (ref 4–11)

## 2023-06-01 PROCEDURE — 82310 ASSAY OF CALCIUM: CPT | Performed by: STUDENT IN AN ORGANIZED HEALTH CARE EDUCATION/TRAINING PROGRAM

## 2023-06-01 PROCEDURE — 83735 ASSAY OF MAGNESIUM: CPT | Performed by: STUDENT IN AN ORGANIZED HEALTH CARE EDUCATION/TRAINING PROGRAM

## 2023-06-01 PROCEDURE — 97530 THERAPEUTIC ACTIVITIES: CPT | Mod: GP | Performed by: PHYSICAL THERAPIST

## 2023-06-01 PROCEDURE — 250N000013 HC RX MED GY IP 250 OP 250 PS 637: Performed by: STUDENT IN AN ORGANIZED HEALTH CARE EDUCATION/TRAINING PROGRAM

## 2023-06-01 PROCEDURE — 84100 ASSAY OF PHOSPHORUS: CPT | Performed by: STUDENT IN AN ORGANIZED HEALTH CARE EDUCATION/TRAINING PROGRAM

## 2023-06-01 PROCEDURE — 250N000013 HC RX MED GY IP 250 OP 250 PS 637

## 2023-06-01 PROCEDURE — 36415 COLL VENOUS BLD VENIPUNCTURE: CPT | Performed by: STUDENT IN AN ORGANIZED HEALTH CARE EDUCATION/TRAINING PROGRAM

## 2023-06-01 PROCEDURE — 85027 COMPLETE CBC AUTOMATED: CPT | Performed by: STUDENT IN AN ORGANIZED HEALTH CARE EDUCATION/TRAINING PROGRAM

## 2023-06-01 PROCEDURE — 99214 OFFICE O/P EST MOD 30 MIN: CPT | Performed by: STUDENT IN AN ORGANIZED HEALTH CARE EDUCATION/TRAINING PROGRAM

## 2023-06-01 PROCEDURE — 250N000012 HC RX MED GY IP 250 OP 636 PS 637: Performed by: STUDENT IN AN ORGANIZED HEALTH CARE EDUCATION/TRAINING PROGRAM

## 2023-06-01 PROCEDURE — 82374 ASSAY BLOOD CARBON DIOXIDE: CPT | Performed by: STUDENT IN AN ORGANIZED HEALTH CARE EDUCATION/TRAINING PROGRAM

## 2023-06-01 PROCEDURE — 82962 GLUCOSE BLOOD TEST: CPT

## 2023-06-01 PROCEDURE — 250N000011 HC RX IP 250 OP 636: Performed by: STUDENT IN AN ORGANIZED HEALTH CARE EDUCATION/TRAINING PROGRAM

## 2023-06-01 PROCEDURE — 250N000011 HC RX IP 250 OP 636

## 2023-06-01 PROCEDURE — 83605 ASSAY OF LACTIC ACID: CPT | Performed by: ORTHOPAEDIC SURGERY

## 2023-06-01 PROCEDURE — 250N000013 HC RX MED GY IP 250 OP 250 PS 637: Performed by: CLINICAL NURSE SPECIALIST

## 2023-06-01 PROCEDURE — 250N000013 HC RX MED GY IP 250 OP 250 PS 637: Performed by: ORTHOPAEDIC SURGERY

## 2023-06-01 RX ORDER — ACETAMINOPHEN 500 MG
1000 TABLET ORAL EVERY 8 HOURS PRN
Qty: 60 TABLET | Refills: 0 | Status: SHIPPED | OUTPATIENT
Start: 2023-06-01 | End: 2023-06-05

## 2023-06-01 RX ORDER — CEVIMELINE HYDROCHLORIDE 30 MG/1
30 CAPSULE ORAL 3 TIMES DAILY PRN
COMMUNITY
Start: 2023-06-01 | End: 2024-07-15

## 2023-06-01 RX ORDER — AMOXICILLIN 250 MG
1 CAPSULE ORAL 2 TIMES DAILY
Qty: 30 TABLET | Refills: 0 | Status: SHIPPED | OUTPATIENT
Start: 2023-06-01 | End: 2023-06-05

## 2023-06-01 RX ORDER — FERROUS GLUCONATE 324(38)MG
324 TABLET ORAL
DISCHARGE
Start: 2023-06-02 | End: 2024-02-01

## 2023-06-01 RX ORDER — LANOLIN ALCOHOL/MO/W.PET/CERES
1000 CREAM (GRAM) TOPICAL
DISCHARGE
Start: 2023-06-02

## 2023-06-01 RX ORDER — TORSEMIDE 20 MG/1
20 TABLET ORAL DAILY
Status: DISCONTINUED | OUTPATIENT
Start: 2023-06-01 | End: 2023-06-05 | Stop reason: HOSPADM

## 2023-06-01 RX ORDER — HYDROXYZINE HYDROCHLORIDE 25 MG/1
25 TABLET, FILM COATED ORAL EVERY 6 HOURS PRN
Qty: 40 TABLET | Refills: 0 | Status: SHIPPED | OUTPATIENT
Start: 2023-06-01 | End: 2023-06-05

## 2023-06-01 RX ORDER — POLYETHYLENE GLYCOL 3350 17 G/17G
17 POWDER, FOR SOLUTION ORAL DAILY
Qty: 10 PACKET | Refills: 0 | Status: SHIPPED | OUTPATIENT
Start: 2023-06-01 | End: 2023-06-05

## 2023-06-01 RX ORDER — ALBUTEROL SULFATE 90 UG/1
2 AEROSOL, METERED RESPIRATORY (INHALATION) EVERY 6 HOURS PRN
DISCHARGE
Start: 2023-06-01

## 2023-06-01 RX ORDER — TIZANIDINE 2 MG/1
2 TABLET ORAL EVERY 6 HOURS PRN
Qty: 30 TABLET | Refills: 0 | Status: SHIPPED | OUTPATIENT
Start: 2023-06-01 | End: 2023-06-05

## 2023-06-01 RX ORDER — OXYCODONE HYDROCHLORIDE 5 MG/1
2.5-5 TABLET ORAL
Qty: 26 TABLET | Refills: 0 | Status: SHIPPED | OUTPATIENT
Start: 2023-06-01 | End: 2023-06-05

## 2023-06-01 RX ADMIN — HYDROMORPHONE HYDROCHLORIDE 0.3 MG: 1 INJECTION, SOLUTION INTRAMUSCULAR; INTRAVENOUS; SUBCUTANEOUS at 00:57

## 2023-06-01 RX ADMIN — HYDROXYZINE HYDROCHLORIDE 25 MG: 25 TABLET, FILM COATED ORAL at 00:59

## 2023-06-01 RX ADMIN — PREDNISONE 5 MG: 5 TABLET ORAL at 08:35

## 2023-06-01 RX ADMIN — CEFAZOLIN SODIUM 2 G: 2 INJECTION, SOLUTION INTRAVENOUS at 02:02

## 2023-06-01 RX ADMIN — OXYCODONE HYDROCHLORIDE 5 MG: 5 TABLET ORAL at 12:07

## 2023-06-01 RX ADMIN — HYDROXYZINE HYDROCHLORIDE 50 MG: 50 TABLET, FILM COATED ORAL at 16:32

## 2023-06-01 RX ADMIN — INSULIN ASPART 3 UNITS: 100 INJECTION, SOLUTION INTRAVENOUS; SUBCUTANEOUS at 03:04

## 2023-06-01 RX ADMIN — TORSEMIDE 20 MG: 20 TABLET ORAL at 15:00

## 2023-06-01 RX ADMIN — OXYCODONE HYDROCHLORIDE 5 MG: 5 TABLET ORAL at 08:53

## 2023-06-01 RX ADMIN — TIZANIDINE 2 MG: 2 TABLET ORAL at 14:59

## 2023-06-01 RX ADMIN — HYDROMORPHONE HYDROCHLORIDE 0.3 MG: 1 INJECTION, SOLUTION INTRAMUSCULAR; INTRAVENOUS; SUBCUTANEOUS at 09:53

## 2023-06-01 RX ADMIN — HYDROXYCHLOROQUINE SULFATE 400 MG: 200 TABLET, FILM COATED ORAL at 08:33

## 2023-06-01 RX ADMIN — ESCITALOPRAM OXALATE 20 MG: 20 TABLET, FILM COATED ORAL at 08:34

## 2023-06-01 RX ADMIN — ALLOPURINOL 100 MG: 100 TABLET ORAL at 20:27

## 2023-06-01 RX ADMIN — HYDROMORPHONE HYDROCHLORIDE 0.2 MG: 0.2 INJECTION, SOLUTION INTRAMUSCULAR; INTRAVENOUS; SUBCUTANEOUS at 21:53

## 2023-06-01 RX ADMIN — FERROUS GLUCONATE 324 MG: 324 TABLET ORAL at 08:54

## 2023-06-01 RX ADMIN — TIZANIDINE 2 MG: 2 TABLET ORAL at 21:32

## 2023-06-01 RX ADMIN — PANTOPRAZOLE SODIUM 40 MG: 40 TABLET, DELAYED RELEASE ORAL at 08:35

## 2023-06-01 RX ADMIN — OXYCODONE HYDROCHLORIDE 5 MG: 5 TABLET ORAL at 01:13

## 2023-06-01 RX ADMIN — TIZANIDINE 2 MG: 2 TABLET ORAL at 08:54

## 2023-06-01 RX ADMIN — OXYCODONE HYDROCHLORIDE 5 MG: 5 TABLET ORAL at 00:58

## 2023-06-01 RX ADMIN — ACETAMINOPHEN 975 MG: 325 TABLET ORAL at 21:32

## 2023-06-01 RX ADMIN — ACETAMINOPHEN 975 MG: 325 TABLET ORAL at 05:36

## 2023-06-01 RX ADMIN — OXYCODONE HYDROCHLORIDE 5 MG: 5 TABLET ORAL at 22:31

## 2023-06-01 RX ADMIN — ASPIRIN 81 MG: 81 TABLET, COATED ORAL at 08:33

## 2023-06-01 RX ADMIN — ASPIRIN 81 MG: 81 TABLET, COATED ORAL at 20:27

## 2023-06-01 RX ADMIN — METOPROLOL SUCCINATE 50 MG: 25 TABLET, EXTENDED RELEASE ORAL at 08:37

## 2023-06-01 RX ADMIN — FLUTICASONE FUROATE AND VILANTEROL TRIFENATATE 1 PUFF: 100; 25 POWDER RESPIRATORY (INHALATION) at 08:36

## 2023-06-01 RX ADMIN — HYDROXYZINE HYDROCHLORIDE 25 MG: 25 TABLET, FILM COATED ORAL at 05:23

## 2023-06-01 RX ADMIN — SENNOSIDES AND DOCUSATE SODIUM 1 TABLET: 50; 8.6 TABLET ORAL at 20:27

## 2023-06-01 RX ADMIN — MONTELUKAST 10 MG: 10 TABLET, FILM COATED ORAL at 21:32

## 2023-06-01 RX ADMIN — ACETAMINOPHEN 975 MG: 325 TABLET ORAL at 14:59

## 2023-06-01 RX ADMIN — OXYCODONE HYDROCHLORIDE 5 MG: 5 TABLET ORAL at 18:45

## 2023-06-01 RX ADMIN — SENNOSIDES AND DOCUSATE SODIUM 1 TABLET: 50; 8.6 TABLET ORAL at 08:34

## 2023-06-01 RX ADMIN — POTASSIUM CHLORIDE 40 MEQ: 1500 TABLET, EXTENDED RELEASE ORAL at 08:35

## 2023-06-01 RX ADMIN — METOPROLOL SUCCINATE 50 MG: 25 TABLET, EXTENDED RELEASE ORAL at 20:27

## 2023-06-01 RX ADMIN — TORSEMIDE 40 MG: 20 TABLET ORAL at 08:38

## 2023-06-01 RX ADMIN — LORATADINE 10 MG: 10 TABLET ORAL at 08:35

## 2023-06-01 RX ADMIN — AZITHROMYCIN MONOHYDRATE 250 MG: 250 TABLET ORAL at 20:27

## 2023-06-01 RX ADMIN — HYDROMORPHONE HYDROCHLORIDE 0.3 MG: 1 INJECTION, SOLUTION INTRAMUSCULAR; INTRAVENOUS; SUBCUTANEOUS at 05:21

## 2023-06-01 RX ADMIN — ATORVASTATIN CALCIUM 5 MG: 10 TABLET, FILM COATED ORAL at 20:27

## 2023-06-01 RX ADMIN — Medication 100 MCG: at 08:34

## 2023-06-01 ASSESSMENT — ACTIVITIES OF DAILY LIVING (ADL)
ADLS_ACUITY_SCORE: 34
ADLS_ACUITY_SCORE: 34
ADLS_ACUITY_SCORE: 35
ADLS_ACUITY_SCORE: 35
ADLS_ACUITY_SCORE: 34
ADLS_ACUITY_SCORE: 35
DEPENDENT_IADLS:: TRANSPORTATION
ADLS_ACUITY_SCORE: 34
ADLS_ACUITY_SCORE: 34
ADLS_ACUITY_SCORE: 35
ADLS_ACUITY_SCORE: 35

## 2023-06-01 NOTE — PROGRESS NOTES
Writer had Pt for a few hours. Pt is tired and in bed for the night. There has been a request that IV Dilaudid not be used.   Pt experiencing very uncomfortable muscle spasms in her groin area. Writer requested for Robaxin.   Pt rating pain 8/10   A/O x4   RA

## 2023-06-01 NOTE — PLAN OF CARE
Goal Outcome Evaluation:      Plan of Care Reviewed With: patient, friend    Overall Patient Progress: improvingOverall Patient Progress: improving    Outcome Evaluation: Discharge to [facility] when medically cleared

## 2023-06-01 NOTE — PROVIDER NOTIFICATION
537 O.C POD 0 RTHA c/o constant pain not relieved with current pain regimen. Pt asking if we can try a different pain medication. PO dilaudid? pls advise. thanks Rhiannon [phone]

## 2023-06-01 NOTE — PLAN OF CARE
VS: VSS   O2: Stable on 2L/NC when napping. No O2 needed when awake.   Output: Voiding on commode.   Last BM: 5/30 per patient.   Activity: Up to commode with assist of 1, gait belt and walker.   Up for meals? Yes   Skin: Intact  Except right hip incision.   Pain: Minimal relief from Oxycodone, Zanaflex and IV Dilaudid. Text to VALENTE Silvestre (1010) informing her of patient status. Patient did  Finally get relief after about 20 minutes. Ice to area also. Pain relief effective this afternoon.   CMS: Mild tingling of LEs.   Dressing: Right hip C/D/I.   Diet: Regular. Ate good breakfast. BG before meals with sliding scale insulin.   LDA: PIV   Equipment: I.S., walker, personal CPAP machine, CAPNO, ice pack to R hip, pneumo cuffs.   Plan: Monitor pain. Encourage CPAP when sleeping.

## 2023-06-01 NOTE — PHARMACY-ADMISSION MEDICATION HISTORY
Pharmacist Admission Medication History    Admission medication history is complete. The information provided in this note is only as accurate as the sources available at the time of the update.    Medication reconciliation/reorder completed by provider prior to medication history? Yes    Information Source(s): Patient, Family member, Caregiver and CareEverywhere/SureScripts via in-person    Pertinent Information: KP spironolactone has been on hold/discontinued since early March     Changes made to PTA medication list:    Added: None    Deleted: None    Changed:   o Insulin lispro from 8 units TID to sliding scale insulin (KP Lantus has been off for a while)   o vitamin B12 and ferrous gluconate from daily to three times weekly (one is MWF and the other is TTS but they couldn't remember which is which)    Allergies reviewed with patient and updates made in EHR: by RN     Medication History Completed By: Kevin Winslow RPH 5/31/2023 11:08 PM    Prior to Admission medications    Medication Sig Last Dose Taking? Auth Provider Long Term End Date   acetaminophen (TYLENOL) 500 MG tablet Take 1,000 mg by mouth every 8 hours as needed (max 6 tablets/24 hours, 2 tablets/dose) 5/30/2023 at prn Yes Dominique Solorio APRN CNP     acyclovir (ZOVIRAX) 400 MG tablet Take 1 tablet (400 mg) by mouth every 8 hours For a couple days Past Month at prn Yes Ilene Tristan MD Yes    acyclovir (ZOVIRAX) 5 % external ointment Apply topically as needed (6 times day PRN for outbreaks) As needed for outbreaks Past Month at prn Yes Ilene Tristan MD     albuterol (PROAIR HFA/PROVENTIL HFA/VENTOLIN HFA) 108 (90 Base) MCG/ACT inhaler Inhale 2 puffs into the lungs every 6 hours  Patient taking differently: Inhale 2 puffs into the lungs every 6 hours as needed for wheezing or shortness of breath Past Month at prn Yes Maryjane Tillman MD Yes    alendronate (FOSAMAX) 70 MG tablet Take 1 tablet (70 mg) by mouth every 7 days  5/29/2023 Yes Zulma Lopez MD Yes    allopurinol (ZYLOPRIM) 100 MG tablet Take 1 tablet (100 mg) by mouth daily 5/30/2023 at PM Yes Zulma Lopez MD     anakinra (KINERET) 100 MG/0.67ML SOSY injection 100 mg every day x 3 days as needed for flare ups More than a month at prn Yes Zulma Lopez MD Yes    atorvastatin (LIPITOR) 10 MG tablet TAKE 1/2 TABLET BY MOUTH EVERY DAY 5/30/2023 Yes Radha Rosa MD Yes    azithromycin (ZITHROMAX) 250 MG tablet TAKE 1 TABLET BY MOUTH EVERY DAY 5/30/2023 Yes Maryjane Tillman MD     BREO ELLIPTA 100-25 MCG/ACT inhaler TAKE 1 PUFF BY MOUTH EVERY DAY 5/30/2023 Yes Maryjane Tillman MD     cevimeline (EVOXAC) 30 MG capsule Take 1 capsule (30 mg) by mouth 3 times daily  Patient taking differently: Take 30 mg by mouth 3 times daily as needed 5/30/2023 at prn Yes Zulma Lopez MD     Cholecalciferol (VITAMIN D3) 50 MCG (2000 UT) CAPS TAKE 100 MCG BY MOUTH DAILY (TAKE 2 TABLET (50 MCG) BY MOUTH DAILY - ORAL) 5/24/2023 at AM Yes Ilene Tristan MD     COMPOUNDED NON-CONTROLLED SUBSTANCE (CMPD RX) - PHARMACY TO MIX COMPOUNDED MEDICATION Estriol 1 mg/g Apply small amount to finger and apply to inside vagina daily for 2 weeks then twice weekly Route: vaginally Dispense 30 grams 11 refills Past Month at prn Yes Vale Nassar MD     cyanocobalamin (VITAMIN B-12) 1000 MCG tablet Take 1 tablet (1,000 mcg) by mouth daily  Patient taking differently: Take 1,000 mcg by mouth three times a week 5/24/2023 at AM Yes Ilene Tristan MD     escitalopram (LEXAPRO) 20 MG tablet TAKE 1 TABLET BY MOUTH EVERY DAY 5/31/2023 at 0430 Yes Ilene Tristan MD Yes    ferrous gluconate (FERGON) 324 (38 Fe) MG tablet TAKE 1 TABLET (324 MG) BY MOUTH DAILY (WITH BREAKFAST)  Patient taking differently: Take 324 mg by mouth three times a week 5/30/2023 Yes Ilene Tristan MD     fluticasone (FLONASE) 50 MCG/ACT nasal spray SPRAY 1-2 SPRAYS INTO BOTH NOSTRILS DAILY  AS NEEDED FOR ALLERGIES Past Month at prn Yes Maryjane Tillman MD     hydroxychloroquine (PLAQUENIL) 200 MG tablet Take 2 tablets (400 mg) by mouth daily Get annual eye exams for hydroxychloroquine (Plaquenil) monitoring and fax to [phone] 5/30/2023 at PM Yes Zulma Lopez MD     insulin lispro (HUMALOG KWIKPEN) 100 UNIT/ML (1 unit dial) KWIKPEN Inject Subcutaneous 3 times daily (before meals) Sliding scale insulin; tests BG three times day  If BG <150, no insulin given   If -200 take 2 units  If -250 take 4 units  If -300 take 6 units  If -350 take 10 units  If BG >350 take 14 units 5/30/2023 Yes Kevin Pressley MD Yes    ketoconazole (NIZORAL) 2 % external cream Apply topically 2 times daily as needed for itching Unknown at prn Yes Ilene Tristan MD     KLOR-CON 20 MEQ CR tablet TAKE 2 TABLETS (40 MEQ) BY MOUTH EVERY MORNING AND 1 TABLET (20 MEQ) EVERY EVENING. 5/30/2023 at PM Yes Radha Rosa MD     lidocaine (LIDODERM) 5 % patch APPLY PATCH TO PAINFUL AREA FOR UP TO 12 H WITHIN A 24 H PERIOD. REMOVE AFTER 12 HOURS.  Patient taking differently: Apply patch to painful area for up to 12 h within a 24 h period.  Remove after 12 hours. Unknown at prn Yes Ilene Tristan MD     loratadine (CLARITIN) 10 MG tablet TAKE 1 TABLET BY MOUTH EVERY DAY 5/30/2023 at AM Yes Ilene Tristan MD     methocarbamol (ROBAXIN) 750 MG tablet Take 1 tablet (750 mg) by mouth 3 times daily as needed for muscle spasms Past Month Yes Charly Moore MD     metoprolol succinate ER (TOPROL XL) 50 MG 24 hr tablet Take 1 tablet (50 mg) by mouth 2 times daily 5/31/2023 at AM Yes Radha Rosa MD Yes    montelukast (SINGULAIR) 10 MG tablet TAKE 1 TABLET BY MOUTH EVERYDAY AT BEDTIME 5/30/2023 at 2230 Yes Ilene Tristan MD Yes    oxyCODONE-acetaminophen (PERCOCET) 7.5-325 MG per tablet Take 1 tablet by mouth 2 times daily as needed for severe pain  With at least 4 hours between doses. Ok to fill and start 4/19/23 5/30/2023 Yes Matthew Landrum MD     pantoprazole (PROTONIX) 40 MG EC tablet Take 1 tablet (40 mg) by mouth daily (replaces famotidine- should stop taking famotidine) 5/31/2023 at 0430 Yes Guru Winsome Dias MD     predniSONE (DELTASONE) 5 MG tablet Take 1 tablet (5 mg) by mouth daily 5/31/2023 at 0430 Yes Zulma Lopez MD No    torsemide (DEMADEX) 20 MG tablet Take 2 tablets (40 mg) by mouth every morning AND 1 tablet (20 mg) every evening. 5/30/2023 at midday Yes Radha Rosa MD Yes    traZODone (DESYREL) 50 MG tablet TAKE 3 TABLETS (150 MG) BY MOUTH NIGHTLY AS NEEDED FOR SLEEP 5/30/2023 at 2230 Yes Ilene Tristan MD Yes    ACCU-CHEK SHANNON PLUS test strip USE TO TEST BLOOD SUGARS 3 TIMES DAILY   Ilene Tristan MD     aspirin (ASA) 81 MG EC tablet Take 1 tablet (81 mg) by mouth daily 5/24/2023 at AM  Kimberley Zavala CNP     BD PEN NEEDLE JANE 2ND GEN 32G X 4 MM miscellaneous USE TO TEST 4 TIMES A DAY   Ilene Tristan MD     blood glucose (NO BRAND SPECIFIED) lancets standard Use to test blood sugar 3 times daily or as directed   Ilene Tristan MD     OZEMPIC, 1 MG/DOSE, 4 MG/3ML pen INJECT 1 MG SUBCUTANEOUS ONCE A WEEK 5/22/23  Ilene Tristan MD

## 2023-06-01 NOTE — PROGRESS NOTES
"[facility]    Medicine Progress Note - Hospitalist Service, GOLD TEAM 21    Date of Admission:  5/31/2023    Assessment & Plan   Betty Tee is a 83 year old female admitted on 5/31/2023 after right total hip arthoplasty.     Interval Changes:  -Increase lantus to 10 units  -Change sliding scale insulin to TID AC  -Resume home diuretics  -Pain management per orthopaedics  -PT/OT consulted    #S/p right total hip arthoplasty  #Osteoarthrtiis  -S/p right total hip arthoplasty on 5/31 with orthopaedics  -Posterior hip preacuations x 3 months on operative side  -ASA ppx x 4 weeks    #Type 2 DM -   -requiring insulin drip after OR  -Sliding scale insulin PTA  -Lantus 10 units, aspart MDCF with meals    #HFpEf -  Follows with [facility] CORE clinic, Dr. Rosa most recent EF 55-60%  Plan:  > Continue torsemide 40 mg qAM, 20 mg qPM     #Atrial Fibrillation s/p watchman procedure - Continue metoprolol  #Gout - allopurinol  #HLD - atorvastatin  #ILD, Sjogrens follicular bronchiolitis - Breo, montelukast, prednisone 5 mg, azithromycin 250 mg,   #Depression - lexapro  #Sjogrens syndrome - Plaquenil  #ANGELICA - CPAP         Diet: Advance Diet as Tolerated: Regular Diet Adult  Discharge Instruction - Regular Diet Adult    DVT Prophylaxis: aspirin per ortho  Kilpatrick Catheter: Not present  Lines: None     Cardiac Monitoring: None  Code Status: Full Code      Clinically Significant Risk Factors Present on Admission                # Drug Induced Platelet Defect: home medication list includes an antiplatelet medication   # Hypertension: Noted on problem list     # DMII: A1C = 7.3 % (Ref range: <5.7 %) within past 6 months    # Overweight: Estimated body mass index is 29.04 kg/m  as calculated from the following:    Height as of this encounter: 1.664 m (5' 5.51\").    Weight as of this encounter: 80.4 kg (177 lb 4 oz).            Disposition Plan     Expected Discharge Date: 06/01/2023          "         Edmundo Rawls MD  Hospitalist Service, GOLD TEAM 21  M [facility]  Securely message with [employer] (more info)  Text page via [employer] Paging/Directory   See signed in provider for up to date coverage information  ______________________________________________________________________    Interval History   This morning patient notes significant pain and spasms. She denies any fever or chills. No chest pain. No shortness of breath. No nausea or vomiting. No abdominal pain. No dysuria or hematuria.     Physical Exam   Vital Signs: Temp: 98.2  F (36.8  C) Temp src: Oral BP: 125/59 Pulse: 50   Resp: 18 SpO2: 98 % O2 Device: Nasal cannula Oxygen Delivery: 2 LPM  Weight: 177 lbs 4 oz    Constitutional: alert, oriented, sitting in chair3  Eyes: no icterus  ENT: no elevated JVP  Respiratory: crackles in lower posterior lobes, no wheezing, no respiratory distress  Cardiovascular: RRR  GI: soft, protuberant, non-tender, bowel sounds present  Musculoskeletal: right hip incision C/D/I  Neurologic: diminshed sensation in bilatearl lower soles of feet    Medical Decision Making             Data     I have personally reviewed the following data over the past 24 hrs:    9.2  \   10.1 (L)   / 140 (L)     138 106 13.8 /  198 (H)   4.3 23 0.73 \       Procal: N/A CRP: N/A Lactic Acid: 1.2

## 2023-06-01 NOTE — PROGRESS NOTES
Orthopaedic Surgery Progress Note 06/01/2023    Interval Events  - s/p OR yesterday  - AFVSS    Subjective  No acute events overnight.  Pain controlled. Denies new numbness, tingling, or weakness.  Tolerating diet without nausea or vomiting.  Voiding spontaneously.  +flatus, -BM.    Objective  Temp: 98.2  F (36.8  C) Temp src: Oral BP: 137/59 Pulse: 50   Resp: 18 SpO2: 98 % O2 Device: Nasal cannula Oxygen Delivery: 2 LPM    Exam:  Gen: No acute distress, resting comfortably in bed.  Resp: Non-labored breathing  Cardio: Regular rate via peripheral pulse  MSK:  RLE:  - Dressings c/d/I  - Fires TA, GSC, EHL, FHL  - SILT tibial/sural/saphenous/DP/SP nerves, mildly diminished DP  - PT/DP pulses 2+, foot wwp    No lab results found in last 7 days.    Invalid input(s): SEDRATE    Imaging: Postop XR with stable implant alignment, no fractures/dislocations    Assessment: Betty Tee is a 83 year old female s/p R SRI on 5/31/2023 with Dr. Shannon.    Today:  - Pain control  - PT/OT  - Possible discharge as early as this afternoon, returning to senior living facility    Plan:  Activity: Up with assist and assistive devices as needed until independent. Posterior hip precautions to operative side x 3 months:  1) No hip flexion beyond 90 degrees  2) No adduction beyond midline  3) No internal rotation beyond neutral   Weight bearing status: WBAT  Antibiotics: Cefazolin x 24 hours  Diet: Begin with clear fluids and progress diet as tolerated. Bowel regimen. Anti-emetics PRN.    DVT prophylaxis:  mg x 4 weeks beginning POD 1   Elevation: Elevate heels off of bed on pillows   Wound Care: Tegaderm and alginate x 2 weeks   Drains: none  Pain management: Orals PRN, IV for breakthrough only  X-rays: AP Pelvis and Lateral operative hip in PACU.   Physical Therapy: Mobilization, ROM, ADL's, Posterior hip precautions.    Occupational Therapy: ADL's  Labs: Trend Hgb on POD #1, 2  Consults: PT, OT. Hospitalist, appreciate  assistance in caring for this patient throughout the perioperative period    Tom Head MD  Orthopaedic Surgery PGY-4  [phone]    Please page me at [phone] with any questions/concerns. If there is no response, if it is a weekend, or if it is during evening hours then please page the orthopaedic surgery resident on call.       Future Appointments   Date Time Provider Department Center   6/1/2023  8:15 AM Suzanne Meehan, PT URPT [place]   6/1/2023  9:30 AM Lydia Wiggins, OTR UROT [place]   6/16/2023  1:00 PM Destinee Panchal PA-C [facility]   7/20/2023 10:20 AM Thomas Shannon MD [facility]   7/21/2023  2:00 PM Zulma Lopez MD [facility]   8/1/2023  2:00 PM Ilene Tristan MD [facility]    9/7/2023 12:45 PM  LAB UCLABR [facility]   9/7/2023  1:00 PM UCECHCR2 [facility]   9/7/2023  2:00 PM Radha Rosa MD [facility]   11/16/2023 11:30 AM UC PFL A UCPFT [facility]   11/16/2023 12:00 PM Maryjane Tillman MD [facility]   4/16/2024  2:00 PM Ilene Tristan MD [facility]

## 2023-06-01 NOTE — PROGRESS NOTES
"   VS: BP (!) 149/53 (BP Location: Right arm)   Pulse 54   Temp (!) 95.2  F (35.1  C) (Oral)   Resp 24   Ht 1.664 m (5' 5.51\")   Wt 80.4 kg (177 lb 4 oz)   SpO2 94%   BMI 29.04 kg/m          O2: 2 LPM via nasal cannula.   CAPNO on   Output: Bed side commode in use and voids with out difficulty.   Last BM: 5/30/23. Active bowel sounds.   Activity: Ax1 with gait belt, walker    Skin: L chest abrasion. L forearm bruise, and R hip surgical incision   Pain: Patient complained of constant pain on R hip surgical incision,  managed with repositioning, cold packs, and PRN medications   CMS: Baseline neuropathy and tingling in BLE.    Dressing: CDI   Diet: Regular diet, thin liquids, takes pills whole   LDA: L PIV infusing LR at 100 mL/hr    Equipment: IV pole, CAPNO, personal walker, and personal belongings   Plan: Continue plan of care   Additional Info:        "

## 2023-06-01 NOTE — CONSULTS
Care Management Initial Consult    General Information  Assessment completed with: Patient, Friend,    Type of CM/SW Visit: Initial Assessment    Primary Care Provider verified and updated as needed: Yes (Ilene Tristan)   Readmission within the last 30 days: no previous admission in last 30 days         Advance Care Planning: Advance Care Planning Reviewed: no concerns identified        Communication Assessment  Patient's communication style: spoken language (English or Bilingual)    Hearing Difficulty or Deaf: no   Wear Glasses or Blind: yes    Cognitive  Cognitive/Neuro/Behavioral: WDL  Level of Consciousness: alert  Arousal Level: opens eyes spontaneously  Orientation: oriented x 4             Living Environment:   People in home: friend(s)     Current living Arrangements: apartment      Able to return to prior arrangements: no       Family/Social Support:  Care provided by: self  Provides care for: no one  Marital Status: Single  Other (specify) (Friend's)          Description of Support System: Supportive, Involved    Support Assessment: Adequate family and caregiver support, Adequate social supports    Current Resources:   Patient receiving home care services: No     Community Resources: None  Equipment currently used at home: walker, rolling  Supplies currently used at home: None    Employment/Financial:  Employment Status: retired        Financial Concerns: No concerns identified   Referral to Financial Worker: No     Does the patient's insurance plan have a 3 day qualifying hospital stay waiver?  No    Lifestyle & Psychosocial Needs:  Social Determinants of Health     Tobacco Use: Medium Risk (5/31/2023)    Patient History      Smoking Tobacco Use: Former      Smokeless Tobacco Use: Never      Passive Exposure: Not on file   Alcohol Use: Not on file   Financial Resource Strain: Not on file   Food Insecurity: Not on file   Transportation Needs: Not on file   Physical Activity: Not on file   Stress:  Not on file   Social Connections: Not on file   Intimate Partner Violence: Not on file   Depression: Not at risk (5/10/2023)    PHQ-2      PHQ-2 Score: 2   Housing Stability: Not on file       Functional Status:  Prior to admission patient needed assistance:   Dependent ADLs:: Ambulation-walker, Dressing, Bathing  Dependent IADLs:: Transportation  Assesssment of Functional Status: Not at baseline with mobility, Not at baseline with ADL Functioning    Mental Health Status:  Mental Health Status: No Current Concerns       Chemical Dependency Status: NA        Values/Beliefs:  Spiritual, Cultural Beliefs, Jehovah's witness Practices, Values that affect care: yes  Description of Beliefs that Will Affect Care: Mormonism    Cultural/Jehovah's witness Practices Patient Routinely Participates In: prayer       Additional Information:  Met with patient and her friend and roommate, Nghia and another friend Yvette. Nghia and Yvette live in a house together. Nghia will be the patient's  at discharge from TCU.   Patient has been accepted to the [facility]. Admission paperwork needs to be signed by an MD prior to discharge.   Patient has been experiencing a lot of pain today. This evening patient is very sleepy. Spoke with Nghia and Yvette, they have not been told when patient is expected to discharge to TCU. [facility] does not admit over the weekend. If patient is not medically stable to discharge on Friday she will be here until Monday. [facility] will continue to follow patient's progress and finalize discharge when patient is medically ready.   Patient may also need transportation from [facility].    Laura Rudolph RN, BSN  Care Coordinator,  Ortho  Phone [phone]  Pager [phone]

## 2023-06-01 NOTE — PROGRESS NOTES
"  VS: /46 (BP Location: Right arm)   Pulse 58   Temp (!) 95.8  F (35.4  C) (Oral)   Resp 26   Ht 1.664 m (5' 5.51\")   Wt 80.4 kg (177 lb 4 oz)   SpO2 93%   BMI 29.04 kg/m     O2: O2 flow at 2L,SOB when using comode   Output: vioded x2   Last BM: O5/30/2023   Activity: Used the comode,watched Tv   Skin: Intact except the Rt hip incision   Pain: Mgt with IV dilauded,oxycodone,Hydrozazine,Tizanidine   CMS: Tingling on the rt leg but no numbness   Dressing: transparent   Diet: regular   LDA: PIV on the rt arm infusing.   Equipment: PIV Pole,call light,comode   Plan: TBD.   Additional Info:        "

## 2023-06-02 ENCOUNTER — APPOINTMENT (OUTPATIENT)
Dept: PHYSICAL THERAPY | Facility: CLINIC | Age: 83
End: 2023-06-02
Attending: ORTHOPAEDIC SURGERY
Payer: MEDICARE

## 2023-06-02 LAB
GLUCOSE BLDC GLUCOMTR-MCNC: 158 MG/DL (ref 70–99)
GLUCOSE BLDC GLUCOMTR-MCNC: 176 MG/DL (ref 70–99)
GLUCOSE BLDC GLUCOMTR-MCNC: 197 MG/DL (ref 70–99)
GLUCOSE BLDC GLUCOMTR-MCNC: 218 MG/DL (ref 70–99)
GLUCOSE BLDC GLUCOMTR-MCNC: 225 MG/DL (ref 70–99)
HGB BLD-MCNC: 10.6 G/DL (ref 11.7–15.7)

## 2023-06-02 PROCEDURE — 82962 GLUCOSE BLOOD TEST: CPT

## 2023-06-02 PROCEDURE — 97530 THERAPEUTIC ACTIVITIES: CPT | Mod: GP

## 2023-06-02 PROCEDURE — 250N000013 HC RX MED GY IP 250 OP 250 PS 637: Performed by: STUDENT IN AN ORGANIZED HEALTH CARE EDUCATION/TRAINING PROGRAM

## 2023-06-02 PROCEDURE — 250N000013 HC RX MED GY IP 250 OP 250 PS 637

## 2023-06-02 PROCEDURE — 250N000012 HC RX MED GY IP 250 OP 636 PS 637: Performed by: STUDENT IN AN ORGANIZED HEALTH CARE EDUCATION/TRAINING PROGRAM

## 2023-06-02 PROCEDURE — 99214 OFFICE O/P EST MOD 30 MIN: CPT | Performed by: STUDENT IN AN ORGANIZED HEALTH CARE EDUCATION/TRAINING PROGRAM

## 2023-06-02 PROCEDURE — 97110 THERAPEUTIC EXERCISES: CPT | Mod: GP

## 2023-06-02 PROCEDURE — 36415 COLL VENOUS BLD VENIPUNCTURE: CPT

## 2023-06-02 PROCEDURE — 250N000013 HC RX MED GY IP 250 OP 250 PS 637: Performed by: CLINICAL NURSE SPECIALIST

## 2023-06-02 PROCEDURE — 85018 HEMOGLOBIN: CPT

## 2023-06-02 RX ADMIN — FLUTICASONE FUROATE AND VILANTEROL TRIFENATATE 1 PUFF: 100; 25 POWDER RESPIRATORY (INHALATION) at 08:18

## 2023-06-02 RX ADMIN — METOPROLOL SUCCINATE 50 MG: 25 TABLET, EXTENDED RELEASE ORAL at 08:17

## 2023-06-02 RX ADMIN — TORSEMIDE 20 MG: 20 TABLET ORAL at 14:31

## 2023-06-02 RX ADMIN — LORATADINE 10 MG: 10 TABLET ORAL at 08:17

## 2023-06-02 RX ADMIN — ACETAMINOPHEN 975 MG: 325 TABLET ORAL at 14:31

## 2023-06-02 RX ADMIN — Medication 100 MCG: at 08:17

## 2023-06-02 RX ADMIN — TORSEMIDE 40 MG: 20 TABLET ORAL at 09:49

## 2023-06-02 RX ADMIN — SENNOSIDES AND DOCUSATE SODIUM 1 TABLET: 50; 8.6 TABLET ORAL at 20:18

## 2023-06-02 RX ADMIN — ATORVASTATIN CALCIUM 5 MG: 10 TABLET, FILM COATED ORAL at 20:17

## 2023-06-02 RX ADMIN — CYANOCOBALAMIN TAB 1000 MCG 1000 MCG: 1000 TAB at 08:17

## 2023-06-02 RX ADMIN — TIZANIDINE 2 MG: 2 TABLET ORAL at 19:43

## 2023-06-02 RX ADMIN — ACETAMINOPHEN 975 MG: 325 TABLET ORAL at 06:35

## 2023-06-02 RX ADMIN — OXYCODONE HYDROCHLORIDE 5 MG: 5 TABLET ORAL at 01:51

## 2023-06-02 RX ADMIN — POLYETHYLENE GLYCOL 3350 17 G: 17 POWDER, FOR SOLUTION ORAL at 08:16

## 2023-06-02 RX ADMIN — TIZANIDINE 2 MG: 2 TABLET ORAL at 13:22

## 2023-06-02 RX ADMIN — ASPIRIN 81 MG: 81 TABLET, COATED ORAL at 20:18

## 2023-06-02 RX ADMIN — ESCITALOPRAM OXALATE 20 MG: 20 TABLET, FILM COATED ORAL at 08:16

## 2023-06-02 RX ADMIN — PANTOPRAZOLE SODIUM 40 MG: 40 TABLET, DELAYED RELEASE ORAL at 08:28

## 2023-06-02 RX ADMIN — OXYCODONE HYDROCHLORIDE 5 MG: 5 TABLET ORAL at 11:01

## 2023-06-02 RX ADMIN — ASPIRIN 81 MG: 81 TABLET, COATED ORAL at 08:16

## 2023-06-02 RX ADMIN — HYDROXYZINE HYDROCHLORIDE 50 MG: 50 TABLET, FILM COATED ORAL at 22:14

## 2023-06-02 RX ADMIN — MONTELUKAST 10 MG: 10 TABLET, FILM COATED ORAL at 22:13

## 2023-06-02 RX ADMIN — TIZANIDINE 2 MG: 2 TABLET ORAL at 03:27

## 2023-06-02 RX ADMIN — METOPROLOL SUCCINATE 50 MG: 25 TABLET, EXTENDED RELEASE ORAL at 20:18

## 2023-06-02 RX ADMIN — HYDROXYZINE HYDROCHLORIDE 25 MG: 25 TABLET, FILM COATED ORAL at 01:51

## 2023-06-02 RX ADMIN — POTASSIUM CHLORIDE 40 MEQ: 1500 TABLET, EXTENDED RELEASE ORAL at 08:16

## 2023-06-02 RX ADMIN — ACETAMINOPHEN 975 MG: 325 TABLET ORAL at 22:14

## 2023-06-02 RX ADMIN — HYDROXYZINE HYDROCHLORIDE 25 MG: 25 TABLET, FILM COATED ORAL at 16:41

## 2023-06-02 RX ADMIN — PREDNISONE 5 MG: 5 TABLET ORAL at 08:17

## 2023-06-02 RX ADMIN — ALLOPURINOL 100 MG: 100 TABLET ORAL at 20:18

## 2023-06-02 RX ADMIN — HYDROXYCHLOROQUINE SULFATE 400 MG: 200 TABLET, FILM COATED ORAL at 08:16

## 2023-06-02 RX ADMIN — AZITHROMYCIN MONOHYDRATE 250 MG: 250 TABLET ORAL at 20:17

## 2023-06-02 RX ADMIN — SENNOSIDES AND DOCUSATE SODIUM 1 TABLET: 50; 8.6 TABLET ORAL at 08:18

## 2023-06-02 RX ADMIN — OXYCODONE HYDROCHLORIDE 5 MG: 5 TABLET ORAL at 06:35

## 2023-06-02 ASSESSMENT — ACTIVITIES OF DAILY LIVING (ADL)
ADLS_ACUITY_SCORE: 31
ADLS_ACUITY_SCORE: 34
ADLS_ACUITY_SCORE: 31

## 2023-06-02 NOTE — UTILIZATION REVIEW
"Admission Status; Secondary Review Determination     Admission Date: 5/31/2023  5:44 AM       Under the authority of the Utilization Management Committee, the utilization review process indicated a secondary review on the above patient.  The review outcome is based on review of the medical records, discussions with staff, and applying clinical experience noted on the date of the review.        ()      Inpatient Status Appropriate - This patient's medical care is consistent with medical management for inpatient care and reasonable inpatient medical practice.      () Observation Status Appropriate - This patient does not meet hospital inpatient criteria and is placed in observation status. If this patient's primary payer is Medicare and was admitted as an inpatient, Condition Code 44 should be used and patient status changed to \"observation\".   () Admission Status NOT Appropriate - This patient's medical care is not consistent with medical management for Inpatient or Observation Status.        (x) Outpatient Procedure Status Appropriate - Procedure not on Medicare Inpatient list and no complications at the time of this review       RATIONALE FOR DETERMINATION      Brief clinical presentation, information copied from the chart, abbreviated and edited for relevant content:     Betty Tee is a 83 year old female s/p R SRI on 5/31/2023 with Dr. Shannon. Patient has had unremarkable post op course. Stable from ortho perspective to discharge, now a disposition issue. Awaiting discharge to home or TCU when available.        In summary, the severity of illness, intensity of service provided, expected length of stay and risk for adverse outcome make the care complex, high risk and appropriate for hospital admission.        The information on this document is developed by the utilization review team in order for the business office to ensure compliance.  This only denotes the appropriateness of proper admission status and " does not reflect the quality of care rendered.         The definitions of Inpatient Status and Observation Status used in making the determination above are those provided in the CMS Coverage Manual, Chapter 1 and Chapter 6, section 70.4.      Sincerely,      Christianne Soni MD   Utilization Review/ Case Management  [facility].

## 2023-06-02 NOTE — PROGRESS NOTES
"[facility]    Medicine Progress Note - Hospitalist Service, GOLD TEAM 21    Date of Admission:  5/31/2023    Assessment & Plan   Betty Tee is a 83 year old female admitted on 5/31/2023 after right total hip arthoplasty.     Interval Changes:  -Decrease lantus to 5 units  -Continued pain control      #S/p right total hip arthoplasty  #Osteoarthrtiis  -S/p right total hip arthoplasty on 5/31 with orthopaedics  -Posterior hip preacuations x 3 months on operative side  -ASA ppx x 4 weeks  -Pain management per orthopaedics  -DVT ppx per orthopaedics    #Type 2 DM -   -requiring insulin drip after OR  -Sliding scale insulin PTA  -Lantus 5 units, aspart MDCF with meals    #HFpEf -  Follows with [facility] C[facility] clinic, Dr. Rosa most recent EF 55-60%  Plan:  > Continue torsemide 40 mg qAM, 20 mg qPM     #Atrial Fibrillation s/p watchman procedure - Continue metoprolol  #Gout - allopurinol  #HLD - atorvastatin  #ILD, Sjogrens follicular bronchiolitis - Breo, montelukast, prednisone 5 mg, azithromycin 250 mg,   #Depression - lexapro  #Sjogrens syndrome - Plaquenil  #ANGELICA - CPAP         Diet: Advance Diet as Tolerated: Regular Diet Adult  Discharge Instruction - Regular Diet Adult    DVT Prophylaxis: aspirin per ortho  Kilpatrick Catheter: Not present  Lines: None     Cardiac Monitoring: None  Code Status: Full Code      Clinically Significant Risk Factors Present on Admission                # Drug Induced Platelet Defect: home medication list includes an antiplatelet medication   # Hypertension: Noted on problem list     # DMII: A1C = 7.3 % (Ref range: <5.7 %) within past 6 months    # Overweight: Estimated body mass index is 29.04 kg/m  as calculated from the following:    Height as of this encounter: 1.664 m (5' 5.51\").    Weight as of this encounter: 80.4 kg (177 lb 4 oz).            Disposition Plan     Expected Discharge Date: 06/02/2023,  3:00 PM    Destination: inpatient " rehabilitation facility  Discharge Comments: U          Edmundo Rawls MD  Hospitalist Service, GOLD TEAM 21  M [facility]  Securely message with [employer] (more info)  Text page via [employer] Paging/Directory   See signed in provider for up to date coverage information  ______________________________________________________________________    Interval History   She continues to have significant pain and spasms. She reports that last night was not much better than the night prior. She is concerned about leaving the hospital today. No fever or chills. No chest pain. No shortness of breath. No issues with urination.     Physical Exam   Vital Signs: Temp: (!) 96.2  F (35.7  C) Temp src: Oral BP: 114/54 Pulse: 60   Resp: 16 SpO2: 92 % O2 Device: None (Room air) Oxygen Delivery: 2 LPM  Weight: 177 lbs 4 oz    Constitutional: alert, oriented, sitting in chair  Eyes: no icterus  ENT: no elevated JVP  Respiratory: no wheezing, non-labored, no tachypnea  GI: soft, protuberant, non-tender, bowel sounds present  Musculoskeletal: right hip incision C/D/I with dressing in place  Neurologic: diminshed sensation in bilatearl lower soles of feet    Medical Decision Making             Data     I have personally reviewed the following data over the past 24 hrs:    9.2  \   10.1 (L)   / 140 (L)     138 106 13.8 /  218 (H)   4.3 23 0.73 \       Procal: N/A CRP: N/A Lactic Acid: 1.2

## 2023-06-02 NOTE — PLAN OF CARE
"      VS: Vital signs:  Vital signs:  Temp: (!) 96.2  F (35.7  C) Temp src: Oral BP: 119/40 Pulse: 62   Resp: 16 SpO2: 92 % O2 Device: None (Room air) Oxygen Delivery: 2 LPM Height: 166.4 cm (5' 5.51\")       O2: 92 RA. Denied sob/cp   Output: Pt ambulated to bathroom 10x today. Pure wick in place    Last BM: 6/2/2023. Bowel sounds active. Denies abdominal pain    Activity: A-1 with walker and gaitbelt    Up for meals? Yes    Skin: Visible skin intact. Mild bruising on R forarm     Pain: Pain managed with PRN oxy/atarax/Zanaflex. Ice pack    Neuro / CMS: A&Ox4 but can be forgetful.  Baseline neuropathy    Dressing: R hip dressing CDI    Diet: Regular diet    LDA: L PIV saline locked    Plan: Continue with plan of care   Additional Info: Pt reported increase difficulty with swallowing. Consult placed. MD aware of low diastolic values for today.                    "

## 2023-06-02 NOTE — PLAN OF CARE
Goal Outcome Evaluation:      Plan of Care Reviewed With: patient       VS: VSS   O2: SpO2 > 90% and stable on RA. LS clear and equal bilaterally. Denies chest pain and SOB.    Output: Voids spontaneously without difficulty using BSC.   Last BM: 5/30    Activity: Up with A1 with GB and walker   Skin: WDL except, R hip incision   Pain: Pain managed with oxycodone, tizanidine, atarax    CMS: Intact, AOx4. Denies numbness and tingling.   Dressing: R hip CDI   Diet: Regular diet. Denies nausea/vomiting.    LDA: L PIV SL   Equipment: IV pole, personal belongings,    Plan: TDB when stable     Additional Info:                   No

## 2023-06-02 NOTE — PROGRESS NOTES
"Orthopaedic Surgery Progress Note 06/02/2023    Interval Events  - AFVSS  - Return to home TCU today vs Monday    Subjective  No acute events overnight.  States \"better\" this AM.  Pain controlled; biggest complaint is muscle spasms. Denies new numbness, tingling, or weakness.  Tolerating diet without nausea or vomiting.  Voiding spontaneously.  +flatus, -BM.    Objective  Temp: (!) 96.2  F (35.7  C) Temp src: Oral BP: 114/54 Pulse: 60   Resp: 16 SpO2: 92 % O2 Device: None (Room air) Oxygen Delivery: 2 LPM    Exam:  Gen: No acute distress, resting comfortably in bed.  Resp: Non-labored breathing  Cardio: Regular rate via peripheral pulse  MSK:  RLE:  - Dressings c/d/I  - Fires TA, GSC, EHL, FHL  - SILT tibial/sural/saphenous/DP/SP nerves, mildly diminished DP  - PT/DP pulses 2+, foot wwp    Recent Labs   Lab 06/01/23  0629   WBC 9.2   HGB 10.1*   *       Imaging: Postop XR with stable implant alignment, no fractures/dislocations     Assessment: Betty Tee is a 83 year old female s/p R SRI on 5/31/2023 with Dr. Shannon.     Today:  - Pain control  - PT/OT  - Okay to discharge from [facility] perspective pending medical stability, returning to senior living facility     Plan:  Activity: Up with assist and assistive devices as needed until independent. Posterior hip precautions to operative side x 3 months:  1) No hip flexion beyond 90 degrees  2) No adduction beyond midline  3) No internal rotation beyond neutral   Weight bearing status: WBAT  Antibiotics: s/p Cefazolin x 24 hours  Diet: Begin with clear fluids and progress diet as tolerated. Bowel regimen. Anti-emetics PRN.    DVT prophylaxis:  mg x 4 weeks beginning POD 1   Elevation: Elevate heels off of bed on pillows   Wound Care: Tegaderm and alginate x 2 weeks   Drains: none  Pain management: Orals PRN, IV for breakthrough only  X-rays: AP Pelvis and Lateral operative hip in PACU -- complete  Physical Therapy: Mobilization, ROM, ADL's, " Posterior hip precautions.    Occupational Therapy: ADL's  Labs: Trend Hgb on POD #1, 2  Consults: PT, OT. Hospitalist, appreciate assistance in caring for this patient throughout the perioperative period    Tom Head MD  Orthopaedic Surgery PGY-4  [phone]    Please page me at [phone] with any questions/concerns. If there is no response, if it is a weekend, or if it is during evening hours then please page the orthopaedic surgery resident on call.       Future Appointments   Date Time Provider Department [place]   6/2/2023 10:00 AM Mya Wylie, PT URPT [place]   6/2/2023  7:45 PM Aleja Hull, OT UROT [place]   6/16/2023  1:00 PM Destinee Panchal PA-C [facility]   7/20/2023 10:20 AM Thomas Shannon MD [facility]   7/21/2023  2:00 PM Zulma Lopez MD [facility]   8/1/2023  2:00 PM Ilene Tristan MD [facility]   9/7/2023 12:45 PM  LAB UCLABR [facility]   9/7/2023  1:00 PM UCECHCR2 [facility]   9/7/2023  2:00 PM Radha Rosa MD [facility]   11/16/2023 11:30 AM UC PFL A UCPFT [facility]   11/16/2023 12:00 PM Maryjane Tillman MD [facility]   4/16/2024  2:00 PM Ilene Tristan MD [facility]

## 2023-06-02 NOTE — PLAN OF CARE
"Goal Outcome Evaluation    [place] note [id number]  VS: /53 (BP Location: Right arm)   Pulse 62   Temp 98.7  F (37.1  C) (Oral)   Resp 16   Ht 1.664 m (5' 5.51\")   Wt 80.4 kg (177 lb 4 oz)   SpO2 91%   BMI 29.04 kg/m       O2: SpO2 > 92% and stable on RA. LS clear and equal bilaterally. Denies chest pain and SOB.    Output: Voids spontaneously without difficulty to bedside commode.   Last BM: LBM 5/30 denies abdominal discomfort. BS active / passing flatus.    Activity: Up with A X2. Uses walker and gait belt.    Skin: WDL except, intact except right hip incision.    Pain: Pain managed with prn oxycodone, Zanaflex and IV dilaudid for breakthrough pain.    CMS: Intact, AOx4. Denies numbness and tingling.   Dressing: Right hip incisional dressing CDI. Ice applied.   Diet: Regular diet. Denies nausea/vomiting.    LDA: PIV Sl into left hand.    Equipment: IV pole, personal belongings,    Plan: Continue with plan of care. Call light within reach, pt able to make needs known.    Additional Info:                              "

## 2023-06-03 ENCOUNTER — APPOINTMENT (OUTPATIENT)
Dept: PHYSICAL THERAPY | Facility: CLINIC | Age: 83
End: 2023-06-03
Attending: ORTHOPAEDIC SURGERY
Payer: MEDICARE

## 2023-06-03 ENCOUNTER — APPOINTMENT (OUTPATIENT)
Dept: SPEECH THERAPY | Facility: CLINIC | Age: 83
End: 2023-06-03
Attending: STUDENT IN AN ORGANIZED HEALTH CARE EDUCATION/TRAINING PROGRAM
Payer: MEDICARE

## 2023-06-03 LAB
GLUCOSE BLDC GLUCOMTR-MCNC: 165 MG/DL (ref 70–99)
GLUCOSE BLDC GLUCOMTR-MCNC: 171 MG/DL (ref 70–99)
GLUCOSE BLDC GLUCOMTR-MCNC: 203 MG/DL (ref 70–99)
GLUCOSE BLDC GLUCOMTR-MCNC: 207 MG/DL (ref 70–99)
GLUCOSE BLDC GLUCOMTR-MCNC: 221 MG/DL (ref 70–99)

## 2023-06-03 PROCEDURE — 97110 THERAPEUTIC EXERCISES: CPT | Mod: GP | Performed by: PHYSICAL THERAPIST

## 2023-06-03 PROCEDURE — 250N000013 HC RX MED GY IP 250 OP 250 PS 637: Performed by: STUDENT IN AN ORGANIZED HEALTH CARE EDUCATION/TRAINING PROGRAM

## 2023-06-03 PROCEDURE — 97530 THERAPEUTIC ACTIVITIES: CPT | Mod: GP | Performed by: PHYSICAL THERAPIST

## 2023-06-03 PROCEDURE — 99214 OFFICE O/P EST MOD 30 MIN: CPT | Performed by: STUDENT IN AN ORGANIZED HEALTH CARE EDUCATION/TRAINING PROGRAM

## 2023-06-03 PROCEDURE — 250N000013 HC RX MED GY IP 250 OP 250 PS 637

## 2023-06-03 PROCEDURE — 250N000012 HC RX MED GY IP 250 OP 636 PS 637: Performed by: STUDENT IN AN ORGANIZED HEALTH CARE EDUCATION/TRAINING PROGRAM

## 2023-06-03 PROCEDURE — 82962 GLUCOSE BLOOD TEST: CPT

## 2023-06-03 PROCEDURE — 92610 EVALUATE SWALLOWING FUNCTION: CPT | Mod: GN

## 2023-06-03 PROCEDURE — 250N000013 HC RX MED GY IP 250 OP 250 PS 637: Performed by: ORTHOPAEDIC SURGERY

## 2023-06-03 PROCEDURE — 250N000013 HC RX MED GY IP 250 OP 250 PS 637: Performed by: CLINICAL NURSE SPECIALIST

## 2023-06-03 RX ADMIN — TRAZODONE HYDROCHLORIDE 150 MG: 150 TABLET ORAL at 00:20

## 2023-06-03 RX ADMIN — OXYCODONE HYDROCHLORIDE 5 MG: 5 TABLET ORAL at 13:40

## 2023-06-03 RX ADMIN — ATORVASTATIN CALCIUM 5 MG: 10 TABLET, FILM COATED ORAL at 20:00

## 2023-06-03 RX ADMIN — ASPIRIN 81 MG: 81 TABLET, COATED ORAL at 20:01

## 2023-06-03 RX ADMIN — TIZANIDINE 2 MG: 2 TABLET ORAL at 10:17

## 2023-06-03 RX ADMIN — POLYETHYLENE GLYCOL 3350 17 G: 17 POWDER, FOR SOLUTION ORAL at 09:02

## 2023-06-03 RX ADMIN — PREDNISONE 5 MG: 5 TABLET ORAL at 09:03

## 2023-06-03 RX ADMIN — TORSEMIDE 20 MG: 20 TABLET ORAL at 14:47

## 2023-06-03 RX ADMIN — ASPIRIN 81 MG: 81 TABLET, COATED ORAL at 09:05

## 2023-06-03 RX ADMIN — POTASSIUM CHLORIDE 40 MEQ: 1500 TABLET, EXTENDED RELEASE ORAL at 09:03

## 2023-06-03 RX ADMIN — PANTOPRAZOLE SODIUM 40 MG: 40 TABLET, DELAYED RELEASE ORAL at 09:04

## 2023-06-03 RX ADMIN — AZITHROMYCIN MONOHYDRATE 250 MG: 250 TABLET ORAL at 20:00

## 2023-06-03 RX ADMIN — SENNOSIDES AND DOCUSATE SODIUM 1 TABLET: 50; 8.6 TABLET ORAL at 19:59

## 2023-06-03 RX ADMIN — TORSEMIDE 40 MG: 20 TABLET ORAL at 09:04

## 2023-06-03 RX ADMIN — OXYCODONE HYDROCHLORIDE 5 MG: 5 TABLET ORAL at 00:20

## 2023-06-03 RX ADMIN — Medication 100 MCG: at 09:03

## 2023-06-03 RX ADMIN — ALLOPURINOL 100 MG: 100 TABLET ORAL at 20:01

## 2023-06-03 RX ADMIN — METOPROLOL SUCCINATE 50 MG: 25 TABLET, EXTENDED RELEASE ORAL at 09:03

## 2023-06-03 RX ADMIN — HYDROXYCHLOROQUINE SULFATE 400 MG: 200 TABLET, FILM COATED ORAL at 09:03

## 2023-06-03 RX ADMIN — LORATADINE 10 MG: 10 TABLET ORAL at 09:04

## 2023-06-03 RX ADMIN — OXYCODONE HYDROCHLORIDE 5 MG: 5 TABLET ORAL at 16:45

## 2023-06-03 RX ADMIN — TIZANIDINE 2 MG: 2 TABLET ORAL at 22:15

## 2023-06-03 RX ADMIN — OXYCODONE HYDROCHLORIDE 5 MG: 5 TABLET ORAL at 20:09

## 2023-06-03 RX ADMIN — METOPROLOL SUCCINATE 50 MG: 25 TABLET, EXTENDED RELEASE ORAL at 20:01

## 2023-06-03 RX ADMIN — ESCITALOPRAM OXALATE 20 MG: 20 TABLET, FILM COATED ORAL at 09:03

## 2023-06-03 RX ADMIN — FLUTICASONE FUROATE AND VILANTEROL TRIFENATATE 1 PUFF: 100; 25 POWDER RESPIRATORY (INHALATION) at 09:04

## 2023-06-03 RX ADMIN — ACETAMINOPHEN 650 MG: 325 TABLET ORAL at 06:38

## 2023-06-03 RX ADMIN — MONTELUKAST 10 MG: 10 TABLET, FILM COATED ORAL at 22:15

## 2023-06-03 RX ADMIN — FERROUS GLUCONATE 324 MG: 324 TABLET ORAL at 09:40

## 2023-06-03 ASSESSMENT — ACTIVITIES OF DAILY LIVING (ADL)
ADLS_ACUITY_SCORE: 28
ADLS_ACUITY_SCORE: 27
ADLS_ACUITY_SCORE: 27
ADLS_ACUITY_SCORE: 31
ADLS_ACUITY_SCORE: 28
ADLS_ACUITY_SCORE: 28
ADLS_ACUITY_SCORE: 27
ADLS_ACUITY_SCORE: 28
ADLS_ACUITY_SCORE: 28
ADLS_ACUITY_SCORE: 31

## 2023-06-03 ASSESSMENT — COLUMBIA-SUICIDE SEVERITY RATING SCALE - C-SSRS
3. HAVE YOU BEEN THINKING ABOUT HOW YOU MIGHT KILL YOURSELF?: NO
2. HAVE YOU ACTUALLY HAD ANY THOUGHTS OF KILLING YOURSELF IN THE PAST MONTH?: NO
4. HAVE YOU HAD THESE THOUGHTS AND HAD SOME INTENTION OF ACTING ON THEM?: NO
1. IN THE PAST MONTH, HAVE YOU WISHED YOU WERE DEAD OR WISHED YOU COULD GO TO SLEEP AND NOT WAKE UP?: NO

## 2023-06-03 NOTE — PROGRESS NOTES
Orthopaedic Surgery Progress Note 06/03/2023    Interval Events  - AFVSS    Subjective  No acute events overnight.  Pain controlled; biggest complaint is muscle spasms. Denies new numbness, tingling, or weakness.  Tolerating diet without nausea or vomiting.  Voiding spontaneously.  +flatus, -BM.    Objective  Temp: (!) 96.4  F (35.8  C) Temp src: Oral BP: (!) 165/54 Pulse: 64   Resp: 16 SpO2: 94 % O2 Device: None (Room air)      Exam:  Gen: No acute distress, resting comfortably in bed.  Resp: Non-labored breathing  Cardio: Regular rate via peripheral pulse  MSK:  RLE:  - Dressings c/d/I  - Fires TA, GSC, EHL, FHL  - SILT tibial/sural/saphenous/DP/SP nerves, mildly diminished DP  - PT/DP pulses 2+, foot wwp    Recent Labs   Lab 06/02/23  0611 06/01/23  0629   WBC  --  9.2   HGB 10.6* 10.1*   PLT  --  140*       Imaging: Postop XR with stable implant alignment, no fractures/dislocations     Assessment: Betty Tee is a 83 year old female s/p R SRI on 5/31/2023 with Dr. Shannon.     Today:  - Pain control  - PT/OT  - Awaiting TCU     Plan:  Activity: Up with assist and assistive devices as needed until independent. Posterior hip precautions to operative side x 3 months:  1) No hip flexion beyond 90 degrees  2) No adduction beyond midline  3) No internal rotation beyond neutral   Weight bearing status: WBAT  Antibiotics: s/p Cefazolin x 24 hours  Diet: Begin with clear fluids and progress diet as tolerated. Bowel regimen. Anti-emetics PRN.    DVT prophylaxis:  mg x 4 weeks beginning POD 1   Elevation: Elevate heels off of bed on pillows   Wound Care: Tegaderm and alginate x 2 weeks   Drains: none  Pain management: Orals PRN, IV for breakthrough only  X-rays: AP Pelvis and Lateral operative hip in PACU -- complete  Physical Therapy: Mobilization, ROM, ADL's, Posterior hip precautions.    Occupational Therapy: ADL's  Labs: Trend Hgb on POD #1, 2  Consults: PT, OT. Hospitalist, appreciate assistance in caring  for this patient throughout the perioperative period    Pankaj Mcclain MD  Adult Reconstructive Surgery Fellow  Department of Orthopaedic Surgery, [facility]         Future Appointments   Date Time Provider Department [place]   6/2/2023 10:00 AM Mya Wylie, PT URPT [place]   6/2/2023  7:45 PM Aleja Hull OT UROT [place]   6/16/2023  1:00 PM Destinee Panchal PA-C UOR [facility]   7/20/2023 10:20 AM Thomas Shannon MD [facility]   7/21/2023  2:00 PM Zulma Lopez MD [facility]   8/1/2023  2:00 PM Ilene Tristan MD [facility]   9/7/2023 12:45 PM UC LAB UCLABR [facility]   9/7/2023  1:00 PM UCECHCR2 [facility]   9/7/2023  2:00 PM Radha Rosa MD [facility]   11/16/2023 11:30 AM UC PFL A UCPFT [facility]   11/16/2023 12:00 PM Maryjane Tillman MD [facility]   4/16/2024  2:00 PM Ilene Tristan MD [facility]

## 2023-06-03 NOTE — PROGRESS NOTES
[facility]   Clinical Swallow Evaluation    06/03/23 1503   Appointment Info   Signing Clinician's Name / Credentials (SLP) Tamiko Mar MS CCC SLP   General Information   Onset of Illness/Injury or Date of Surgery 05/31/23   Referring Physician Edmundo Rawls MD   Patient/Family Therapy Goal Statement (SLP) None stated   Pertinent History of Current Problem Per provider documentation - Betty Tee is a 83 year old female admitted on 5/31/2023 after right total hip arthoplasty.   General Observations Pt is alert and agreeable to evaluation.   Type of Evaluation   Type of Evaluation Swallow Evaluation   Oral Motor   Oral Musculature generally intact   Structural Abnormalities none present   Mucosal Quality adequate   Dentition (Oral Motor)   Dentition (Oral Motor) adequate dentition   Vocal Quality/Secretion Management (Oral Motor)   Vocal Quality (Oral Motor) WFL   Secretion Management (Oral Motor) WNL   General Swallowing Observations   Past History of Dysphagia Pt reports a hx of GERD with use of PPI. Pt does not have any aspiration concerns   Respiratory Support (General Swallowing Observations) none   Current Diet/Method of Nutritional Intake (General Swallowing Observations, NIS) regular diet;thin liquids (level 0)   Swallowing Evaluation Clinical swallow evaluation   Clinical Swallow Evaluation   Feeding Assistance no assistance needed   Clinical Swallow Evaluation Textures Trialed thin liquids;pureed;solid foods   Clinical Swallow Eval: Thin Liquid Texture Trial   Mode of Presentation, Thin Liquids straw;self-fed   Volume of Liquid or Food Presented 4 oz   Oral Phase of Swallow WFL   Pharyngeal Phase of Swallow intact   Diagnostic Statement No overt s/sx of aspiration   Clinical Swallow Evaluation: Puree Solid Texture Trial   Mode of Presentation, Puree spoon;self-fed   Volume of Puree Presented 2 oz   Oral Phase, Puree WFL   Pharyngeal Phase, Puree intact    Diagnostic Statement No overt s/sx of aspiration   Clinical Swallow Evaluation: Solid Food Texture Trial   Mode of Presentation self-fed   Volume Presented 1 cracker   Oral Phase WFL   Pharyngeal Phase intact   Diagnostic Statement No overt s/sx of aspiration   Esophageal Phase of Swallow   Patient reports or presents with symptoms of esophageal dysphagia Yes   Esophageal comments Documented GERD, use of PPI, pt report of globus sensation at the level of the chest. Pt denies any difficulty swallowing but say she had trouble with a piece of lettuce earlier this admission which is what she feels triggered the SLP consult.   Swallowing Recommendations   Diet Consistency Recommendations regular diet;thin liquids (level 0)   Supervision Level for Intake patient independent   Mode of Delivery Recommendations bolus size, small;slow rate of intake   Swallowing Maneuver Recommendations alternate food and liquid intake   Recommended Feeding/Eating Techniques (Swallow Eval) maintain upright sitting position for eating;maintain upright posture during/after eating for 30 minutes;minimize distractions during oral intake   Medication Administration Recommendations, Swallowing (SLP) As per pt preference   Instrumental Assessment Recommendations instrumental evaluation not recommended at this time   Clinical Impression   Criteria for Skilled Therapeutic Interventions Met (SLP Eval) Yes, treatment indicated;No problems identified which require skilled intervention   SLP Diagnosis Functional oropharyngeal swallow   Risks & Benefits of therapy have been explained evaluation/treatment results reviewed;care plan/treatment goals reviewed;participants voiced agreement with care plan;participants included;patient   Clinical Impression Comments Pt seen for clinical swallow evaluation. She consumed all trials without overt s/sx of aspiration. Pt denies any difficulty t/o the evaluation but she admits she often feels things are stuck at the  level of her chest. Oropharyngeal swallow appears WFL. Suspect pt's difficulty is related to esophageal dysfunction/reflux - which is currently managed by medications. Recommend pt continue a regular diet and thin liquids with general safe swallow strategies. SLP evaluation only, no further services warranted.   SLP Total Evaluation Time   Eval: oral/pharyngeal swallow function, clinical swallow Minutes ([id number]) 15   SLP Discharge Planning   SLP Plan eval only

## 2023-06-03 NOTE — PLAN OF CARE
VS: Vital signs:  Temp: (!) 96.4  F (35.8  C) Temp src: Oral BP: (!) 165/54 Pulse: 64   Resp: 16 SpO2: 94 % O2 Device: None (Room air)    O2: 92 RA. Denied sob/cp   Output: Pt ambulated to bathroom 3x today.     Last BM: 6/2/2023. Bowel sounds active. Denies abdominal pain    Activity: A-1 with walker and gaitbelt    Up for meals? Yes    Skin: Visible skin intact. Mild bruising on R forarm     Pain: Pain managed with PRN oxu. Ice pack    Neuro / CMS: A&Ox4 but can be forgetful.  Baseline neuropathy    Dressing: R hip dressing CDI    Diet: Regular diet    LDA: L PIV saline locked    Plan: Continue with plan of care   Additional Info: Pt reported increase difficulty with swallowing. Consult placed.

## 2023-06-03 NOTE — UTILIZATION REVIEW
Concurrent stay review; Secondary Review Determination - [facility]        Under the authority of the Utilization Management Committee, the utilization review process indicated a secondary review on the above patient.  The review outcome is based on review of the medical records, discussions with staff, and applying clinical experience noted on the date of the review.        (x) Observation/outpatient Status Appropriate - Concurrent stay review       RATIONALE FOR DETERMINATION:   83-year-old female with history of Sjogren's syndrome with lung involvement, interstitial lung disease, vitamin B12 deficiency, paroxysmal atrial fibrillation, lower GI bleed, admitted with osteoarthritis,  status post right hip arthroplasty.  Patient is now waiting for placement to TCU, does not meet criteria for inpatient stay, is waiting for placement and will wait under halfway care.  Patient delayed discharge is related to disposition, there is no medical necessity for inpatient admission at the time of this review. If there is a change in patient status, please resend for review.    The information on this document is developed by the utilization review team in order for the business office to ensure compliance.  This only denotes the appropriateness of proper admission status and does not reflect the quality of care rendered.       The definitions of Inpatient Status and Observation Status used in making the determination above are those provided in the CMS Coverage Manual, Chapter 1 and Chapter 6, section 70.4.       Sincerely,    Perlita Israel MD

## 2023-06-03 NOTE — PLAN OF CARE
"VS: Blood pressure 116/50, pulse 69, temperature (!) 96.2  F (35.7  C), temperature source Oral, resp. rate 16, height 1.664 m (5' 5.51\"), weight 80.4 kg (177 lb 4 oz), SpO2 92 %, not currently breastfeeding.    O2: RA   Output: Purewick in place, pt on diuretics and has frequency with urination     Last BM: 6/2/2023.    Activity: A-1 with walker and gaitbelt    Skin: Incision R hip   Pain: Pt reporting increased pain today. Declining oxycodone when offered. Pt did take atarax and zanaflex as well as scheduled tylenol   Neuro / CMS: A&Ox4    Dressing: R hip dressing CDI    Diet: Regular diet    LDA: L PIV saline locked    Plan: TBD   Additional Info: Pt reported increase difficulty with swallowing. Consult placed.          "

## 2023-06-03 NOTE — PROGRESS NOTES
"[facility]    Medicine Progress Note - Hospitalist Service, GOLD TEAM 21    Date of Admission:  5/31/2023    Assessment & Plan   Betty Tee is a 83 year old female admitted on 5/31/2023 after right total hip arthoplasty.     Interval Changes:  -Add aspart 2 units TID AC with meals  -Anticipate switching to outpatient diabetes regiment in next 2-4 hours  -Atarax prn for anxiety      #S/p right total hip arthoplasty  #Osteoarthrtiis  -S/p right total hip arthoplasty on 5/31 with orthopaedics  -Posterior hip preacuations x 3 months on operative side  -ASA ppx x 4 weeks  -Pain management per orthopaedics  -DVT ppx per orthopaedics    #Type 2 DM -   -requiring insulin drip after OR  -Sliding scale insulin PTA  -Lantus 5 units, aspart 2 unitd TID AC, aspart MDCF with meals    #HFpEf -  Follows with [facility] CORE clinic, Dr. Rosa most recent EF 55-60%  Plan:  > Continue torsemide 40 mg qAM, 20 mg qPM     #Atrial Fibrillation s/p watchman procedure - Continue metoprolol  #Gout - allopurinol  #HLD - atorvastatin  #ILD, Sjogrens follicular bronchiolitis - Breo, montelukast, prednisone 5 mg, azithromycin 250 mg,   #Depression - lexapro  #Sjogrens syndrome - Plaquenil  #ANGELICA - CPAP         Diet: Advance Diet as Tolerated: Regular Diet Adult  Discharge Instruction - Regular Diet Adult    DVT Prophylaxis: aspirin per ortho  Kilpatrick Catheter: Not present  Lines: None     Cardiac Monitoring: None  Code Status: Full Code      Clinically Significant Risk Factors Present on Admission                # Drug Induced Platelet Defect: home medication list includes an antiplatelet medication   # Hypertension: Noted on problem list     # DMII: A1C = 7.3 % (Ref range: <5.7 %) within past 6 months    # Overweight: Estimated body mass index is 29.04 kg/m  as calculated from the following:    Height as of this encounter: 1.664 m (5' 5.51\").    Weight as of this encounter: 80.4 kg (177 lb 4 oz).   "          Disposition Plan     Expected Discharge Date: 06/05/2023,  3:00 PM    Destination: inpatient rehabilitation facility  Discharge Comments: TCU, pain control          Edmundo Rawls MD  Hospitalist Service, GOLD TEAM 21  [facility]  Securely message with [employer] (more info)  Text page via [facility] Paging/Directory   See signed in provider for up to date coverage information  ______________________________________________________________________    Interval History   Very busy this morning with therapy. No fever or chills. No chest pain. No shortness of breath. Reports some anxiety before therapy and movement. She is wondering if something for anxiety before these could be helpful. She is open to trying atarax. No constipation or urination difficulties. .     Physical Exam   Vital Signs: Temp: (!) 96.2  F (35.7  C) Temp src: Oral BP: 116/50 Pulse: 69   Resp: 16 SpO2: 92 % O2 Device: None (Room air)    Weight: 177 lbs 4 oz    Constitutional: alert, oriented, sitting in chair  Eyes: no icterus  ENT: no elevated JVP  CV: RRR  Respiratory: no wheezing, non-labored, no tachypnea  GI: soft, protuberant, non-tender, bowel sounds present  Musculoskeletal: right hip incision C/D/I with dressing in place  Neurologic: diminshed sensation in bilatearl lower soles of feet    Medical Decision Making             Data

## 2023-06-03 NOTE — PLAN OF CARE
Patient A/Ox4, mario alberto lady for the most part. VSS. Denies CP, SOB, dizziness/LH. LSCTA. +fl/BS. Voiding with frequency and urgency in commode, has purewick as well that doesn't work great. CMS intact. Dressing to hip CDI. Tolerating regular diet without NV. Activity level is pretty good, pt up to commode a few times overnight. IV SL. Pain rated as quite high throughout shift, pt takes scheduled medications and refuses most PRNs - pain control has been troublesome for pt and pt doesn't really like any of the PRNs available to her. RNCC following along for discharge plan. Patient has demonstrated ability to call appropriately. Patient is resting with call light within reach. Will continue to monitor.

## 2023-06-04 ENCOUNTER — APPOINTMENT (OUTPATIENT)
Dept: PHYSICAL THERAPY | Facility: CLINIC | Age: 83
End: 2023-06-04
Attending: ORTHOPAEDIC SURGERY
Payer: MEDICARE

## 2023-06-04 LAB
GLUCOSE BLDC GLUCOMTR-MCNC: 159 MG/DL (ref 70–99)
GLUCOSE BLDC GLUCOMTR-MCNC: 161 MG/DL (ref 70–99)
GLUCOSE BLDC GLUCOMTR-MCNC: 170 MG/DL (ref 70–99)
GLUCOSE BLDC GLUCOMTR-MCNC: 170 MG/DL (ref 70–99)
GLUCOSE BLDC GLUCOMTR-MCNC: 296 MG/DL (ref 70–99)
LACTATE SERPL-SCNC: 1.5 MMOL/L (ref 0.7–2)

## 2023-06-04 PROCEDURE — 250N000013 HC RX MED GY IP 250 OP 250 PS 637

## 2023-06-04 PROCEDURE — 36415 COLL VENOUS BLD VENIPUNCTURE: CPT | Performed by: INTERNAL MEDICINE

## 2023-06-04 PROCEDURE — 99214 OFFICE O/P EST MOD 30 MIN: CPT | Performed by: STUDENT IN AN ORGANIZED HEALTH CARE EDUCATION/TRAINING PROGRAM

## 2023-06-04 PROCEDURE — 250N000013 HC RX MED GY IP 250 OP 250 PS 637: Performed by: STUDENT IN AN ORGANIZED HEALTH CARE EDUCATION/TRAINING PROGRAM

## 2023-06-04 PROCEDURE — 250N000012 HC RX MED GY IP 250 OP 636 PS 637: Performed by: STUDENT IN AN ORGANIZED HEALTH CARE EDUCATION/TRAINING PROGRAM

## 2023-06-04 PROCEDURE — 82962 GLUCOSE BLOOD TEST: CPT

## 2023-06-04 PROCEDURE — 97530 THERAPEUTIC ACTIVITIES: CPT | Mod: GP | Performed by: PHYSICAL THERAPIST

## 2023-06-04 PROCEDURE — 83605 ASSAY OF LACTIC ACID: CPT | Performed by: INTERNAL MEDICINE

## 2023-06-04 PROCEDURE — 250N000013 HC RX MED GY IP 250 OP 250 PS 637: Performed by: CLINICAL NURSE SPECIALIST

## 2023-06-04 RX ORDER — HYDROMORPHONE HYDROCHLORIDE 2 MG/1
2 TABLET ORAL
Status: DISCONTINUED | OUTPATIENT
Start: 2023-06-04 | End: 2023-06-05 | Stop reason: HOSPADM

## 2023-06-04 RX ORDER — HYDROMORPHONE HYDROCHLORIDE 4 MG/1
4 TABLET ORAL
Status: DISCONTINUED | OUTPATIENT
Start: 2023-06-04 | End: 2023-06-05 | Stop reason: HOSPADM

## 2023-06-04 RX ORDER — ACETAMINOPHEN 325 MG/1
975 TABLET ORAL 3 TIMES DAILY
Status: DISCONTINUED | OUTPATIENT
Start: 2023-06-04 | End: 2023-06-05 | Stop reason: HOSPADM

## 2023-06-04 RX ADMIN — METOPROLOL SUCCINATE 50 MG: 25 TABLET, EXTENDED RELEASE ORAL at 20:20

## 2023-06-04 RX ADMIN — HYDROMORPHONE HYDROCHLORIDE 4 MG: 4 TABLET ORAL at 20:52

## 2023-06-04 RX ADMIN — ACETAMINOPHEN 975 MG: 325 TABLET ORAL at 20:20

## 2023-06-04 RX ADMIN — HYDROMORPHONE HYDROCHLORIDE 4 MG: 4 TABLET ORAL at 17:56

## 2023-06-04 RX ADMIN — ATORVASTATIN CALCIUM 5 MG: 10 TABLET, FILM COATED ORAL at 20:20

## 2023-06-04 RX ADMIN — ESCITALOPRAM OXALATE 20 MG: 20 TABLET, FILM COATED ORAL at 09:14

## 2023-06-04 RX ADMIN — ACETAMINOPHEN 975 MG: 325 TABLET ORAL at 15:09

## 2023-06-04 RX ADMIN — SENNOSIDES AND DOCUSATE SODIUM 1 TABLET: 50; 8.6 TABLET ORAL at 09:15

## 2023-06-04 RX ADMIN — MONTELUKAST 10 MG: 10 TABLET, FILM COATED ORAL at 21:50

## 2023-06-04 RX ADMIN — TORSEMIDE 40 MG: 20 TABLET ORAL at 09:16

## 2023-06-04 RX ADMIN — FLUTICASONE FUROATE AND VILANTEROL TRIFENATATE 1 PUFF: 100; 25 POWDER RESPIRATORY (INHALATION) at 09:34

## 2023-06-04 RX ADMIN — HYDROMORPHONE HYDROCHLORIDE 2 MG: 2 TABLET ORAL at 05:51

## 2023-06-04 RX ADMIN — ASPIRIN 81 MG: 81 TABLET, COATED ORAL at 20:20

## 2023-06-04 RX ADMIN — PREDNISONE 5 MG: 5 TABLET ORAL at 09:14

## 2023-06-04 RX ADMIN — ACETAMINOPHEN 975 MG: 325 TABLET ORAL at 11:43

## 2023-06-04 RX ADMIN — LORATADINE 10 MG: 10 TABLET ORAL at 09:15

## 2023-06-04 RX ADMIN — TIZANIDINE 2 MG: 2 TABLET ORAL at 21:50

## 2023-06-04 RX ADMIN — SENNOSIDES AND DOCUSATE SODIUM 1 TABLET: 50; 8.6 TABLET ORAL at 20:20

## 2023-06-04 RX ADMIN — OXYCODONE HYDROCHLORIDE 5 MG: 5 TABLET ORAL at 03:03

## 2023-06-04 RX ADMIN — TIZANIDINE 2 MG: 2 TABLET ORAL at 04:28

## 2023-06-04 RX ADMIN — HYDROMORPHONE HYDROCHLORIDE 4 MG: 4 TABLET ORAL at 10:43

## 2023-06-04 RX ADMIN — TORSEMIDE 20 MG: 20 TABLET ORAL at 15:09

## 2023-06-04 RX ADMIN — PANTOPRAZOLE SODIUM 40 MG: 40 TABLET, DELAYED RELEASE ORAL at 09:14

## 2023-06-04 RX ADMIN — ALLOPURINOL 100 MG: 100 TABLET ORAL at 20:20

## 2023-06-04 RX ADMIN — ASPIRIN 81 MG: 81 TABLET, COATED ORAL at 09:14

## 2023-06-04 RX ADMIN — Medication 100 MCG: at 09:13

## 2023-06-04 RX ADMIN — HYDROMORPHONE HYDROCHLORIDE 4 MG: 4 TABLET ORAL at 23:48

## 2023-06-04 RX ADMIN — HYDROXYZINE HYDROCHLORIDE 25 MG: 25 TABLET, FILM COATED ORAL at 03:04

## 2023-06-04 RX ADMIN — TIZANIDINE 2 MG: 2 TABLET ORAL at 15:19

## 2023-06-04 RX ADMIN — HYDROXYCHLOROQUINE SULFATE 400 MG: 200 TABLET, FILM COATED ORAL at 09:14

## 2023-06-04 RX ADMIN — POTASSIUM CHLORIDE 40 MEQ: 1500 TABLET, EXTENDED RELEASE ORAL at 09:15

## 2023-06-04 RX ADMIN — HYDROXYZINE HYDROCHLORIDE 50 MG: 50 TABLET, FILM COATED ORAL at 18:50

## 2023-06-04 RX ADMIN — HYDROXYZINE HYDROCHLORIDE 25 MG: 25 TABLET, FILM COATED ORAL at 11:43

## 2023-06-04 RX ADMIN — MAGNESIUM HYDROXIDE 30 ML: 400 SUSPENSION ORAL at 10:26

## 2023-06-04 RX ADMIN — METOPROLOL SUCCINATE 50 MG: 25 TABLET, EXTENDED RELEASE ORAL at 09:14

## 2023-06-04 RX ADMIN — AZITHROMYCIN MONOHYDRATE 250 MG: 250 TABLET ORAL at 20:20

## 2023-06-04 RX ADMIN — BISACODYL 10 MG: 10 SUPPOSITORY RECTAL at 15:09

## 2023-06-04 RX ADMIN — POLYETHYLENE GLYCOL 3350 17 G: 17 POWDER, FOR SOLUTION ORAL at 09:16

## 2023-06-04 ASSESSMENT — ACTIVITIES OF DAILY LIVING (ADL)
ADLS_ACUITY_SCORE: 27
ADLS_ACUITY_SCORE: 33
ADLS_ACUITY_SCORE: 27
ADLS_ACUITY_SCORE: 31
ADLS_ACUITY_SCORE: 31
ADLS_ACUITY_SCORE: 27
ADLS_ACUITY_SCORE: 27
ADLS_ACUITY_SCORE: 31
ADLS_ACUITY_SCORE: 27
ADLS_ACUITY_SCORE: 27

## 2023-06-04 NOTE — PROGRESS NOTES
"[facility]    Medicine Progress Note - Hospitalist Service, GOLD TEAM 21    Date of Admission:  5/31/2023    Assessment & Plan   Betty Tee is a 83 year old female admitted on 5/31/2023 after right total hip arthoplasty.     Interval Changes:  -Increase aspart to 3 units TID AC  -Patient requested scheduled tylenol after discussion this morning - ordered 975 mg TID    #S/p right total hip arthoplasty  #Osteoarthrtiis  -S/p right total hip arthoplasty on 5/31 with orthopaedics  -Posterior hip preacuations x 3 months on operative side  -ASA ppx x 4 weeks  -Pain management per orthopaedics - added scheduled tylenol  -DVT ppx per orthopaedics    #Type 2 DM -   -requiring insulin drip after OR  -Sliding scale insulin PTA  -Lantus 5 units, aspart 3 unitd TID AC, aspart MDCF with meals    #HFpEf -  Follows with [facility] CORE clinic, Dr. Rosa most recent EF 55-60%  Plan:  > Continue torsemide 40 mg qAM, 20 mg qPM     #Atrial Fibrillation s/p watchman procedure - Continue metoprolol  #Gout - allopurinol  #HLD - atorvastatin  #ILD, Sjogrens follicular bronchiolitis - Breo, montelukast, prednisone 5 mg, azithromycin 250 mg,   #Depression - lexapro  #Sjogrens syndrome - Plaquenil  #ANGELICA - CPAP         Diet: Advance Diet as Tolerated: Regular Diet Adult  Discharge Instruction - Regular Diet Adult    DVT Prophylaxis: aspirin per ortho  Kilpatrick Catheter: Not present  Lines: None     Cardiac Monitoring: None  Code Status: Full Code      Clinically Significant Risk Factors Present on Admission                # Drug Induced Platelet Defect: home medication list includes an antiplatelet medication   # Hypertension: Noted on problem list     # DMII: A1C = 7.3 % (Ref range: <5.7 %) within past 6 months    # Overweight: Estimated body mass index is 29.04 kg/m  as calculated from the following:    Height as of this encounter: 1.664 m (5' 5.51\").    Weight as of this encounter: 80.4 kg (177 " "lb 4 oz).            Disposition Plan     Expected Discharge Date: 06/05/2023,  3:00 PM    Destination: inpatient rehabilitation facility  Discharge Comments: TCU, pain control          Edmundo Rawls MD  Hospitalist Service, GOLD TEAM 21  [facility]  Securely message with [employer] (more info)  Text page via [facility] Paging/Directory   See signed in provider for up to date coverage information  ______________________________________________________________________    Interval History   No acute events overnight. No fever or chills. No chest pain. No shortness of breath. She continues to have spasms in the hip which are excruciating. Reports last night pain was difficulty. No nausea or vomiting. Her appetite hasnt been very good.     We discussed taking medication regularly to prevent \"crisis\" moments.     Physical Exam   Vital Signs: Temp: 98.9  F (37.2  C) Temp src: Oral BP: 130/58 Pulse: 69   Resp: 16 SpO2: 95 % O2 Device: None (Room air)    Weight: 177 lbs 4 oz    Constitutional: alert, oriented, sitting in chair  Eyes: no icterus  ENT: no elevated JVP  CV: RRR  Respiratory: no wheezing, non-labored, no tachypnea  GI: soft, protuberant, non-tender, bowel sounds present  Musculoskeletal: right hip incision C/D/I with dressing in place,   Neurologic: diminshed sensation in bilatearl lower soles of feet    Medical Decision Making             Data         "

## 2023-06-04 NOTE — PROGRESS NOTES
Orthopaedic Surgery Progress Note 06/04/2023    Interval Events  - AFVSS    Subjective  No acute events overnight.  Pain controlled. Denies new numbness, tingling, or weakness.  Tolerating diet without nausea or vomiting.  Voiding spontaneously.    Objective  Temp: 98.9  F (37.2  C) Temp src: Oral BP: 130/58 Pulse: 69   Resp: 16 SpO2: 95 % O2 Device: None (Room air)      Exam:  Gen: No acute distress, resting comfortably in bed.  Resp: Non-labored breathing  Cardio: Regular rate via peripheral pulse  MSK:  RLE:  - Dressings c/d/I  - Fires TA, GSC, EHL, FHL  - SILT tibial/sural/saphenous/DP/SP nerves, mildly diminished DP  - PT/DP pulses 2+, foot wwp    Recent Labs   Lab 06/02/23  0611 06/01/23  0629   WBC  --  9.2   HGB 10.6* 10.1*   PLT  --  140*       Imaging: Postop XR with stable implant alignment, no fractures/dislocations     Assessment: Betty Tee is a 83 year old female s/p R SRI on 5/31/2023 with Dr. Shannon.     Today:  - Pain control  - PT/OT  - Awaiting TCU     Plan:  Activity: Up with assist and assistive devices as needed until independent. Posterior hip precautions to operative side x 3 months:  1) No hip flexion beyond 90 degrees  2) No adduction beyond midline  3) No internal rotation beyond neutral   Weight bearing status: WBAT  Antibiotics: s/p Cefazolin x 24 hours  Diet: Begin with clear fluids and progress diet as tolerated. Bowel regimen. Anti-emetics PRN.    DVT prophylaxis:  mg x 4 weeks beginning POD 1   Elevation: Elevate heels off of bed on pillows   Wound Care: Tegaderm and alginate x 2 weeks   Drains: none  Pain management: Orals PRN, IV for breakthrough only  X-rays: AP Pelvis and Lateral operative hip in PACU -- complete  Physical Therapy: Mobilization, ROM, ADL's, Posterior hip precautions.    Occupational Therapy: ADL's  Labs: Trend Hgb on POD #1, 2  Consults: PT, OT. Hospitalist, appreciate assistance in caring for this patient throughout the perioperative  period    Pankaj Mcclain MD  Adult Reconstructive Surgery Fellow  Department of Orthopaedic Surgery, [facility]         Future Appointments   Date Time Provider Department Center   6/2/2023 10:00 AM Mya Wylie, PT URPT [place]   6/2/2023  7:45 PM Aleja Hull, OT UROT [place]   6/16/2023  1:00 PM [facility]   7/20/2023 10:20 AM Thomas Shannon MD [facility]   7/21/2023  2:00 PM Zulma Lopez MD [facility]   8/1/2023  2:00 PM Ilene Tristan MD [facility]   9/7/2023 12:45 PM UC LAB UCLABR [facility]   9/7/2023  1:00 PM UCECHCR2 [facility]   9/7/2023  2:00 PM Radha Rosa MD [facility]   11/16/2023 11:30 AM UC PFL A UCPFT [facility]   11/16/2023 12:00 PM Maryjane Tillman MD CLS [facility]   4/16/2024  2:00 PM Ilene Tristan MD [facility]

## 2023-06-04 NOTE — PLAN OF CARE
"         VS: Vital signs:  Temp: 97.4  F (36.3  C) Temp src: Oral BP: (!) 168/45 Pulse: 61   Resp: 16     Oxygen Delivery: 2 LPM Height: 166.4 cm (5' 5.51\")    O2: 92 RA. Denied sob/cp   Output: Pt ambulated to bathroom.    Last BM: 6/4/2023. Bowel sounds active. Suppository given with pt reporting moderate output.     Activity: A-1 with walker and gaitbelt    Up for meals? Yes    Skin: Visible skin intact. Mild bruising on R forarm     Pain: Pain managed with PRN dilaudid, atarax, Zanaflex.  Ice pack    Neuro / CMS: A&Ox4.  Baseline neuropathy    Dressing: R hip dressing CDI    Diet: Regular diet    LDA: No IV access.    Plan: Continue with plan of care   Additional Info:                             "

## 2023-06-04 NOTE — PLAN OF CARE
Goal Outcome Evaluation:      VS: Vital signs stable and within WDL : /59, pulse: 59, resp: 16, taken at 1512   O2: O2 sats 95% on room air.   Output: Patient up to bedside commode to void 3 times.    Last BM: 06/01/2023   Activity: Pt. Up with assist of one, maintained hip precautions, use pillow between legs. Pt. experiences anxiety and pain with activity.    Skin: Warm, dry, and intact. Some bruising on RUE.   Pain: Pt. Rates hip pain at 7 or 8/10 when moving in bed, she was given PRN oxy at 4:40pm and 7:45pm.   CMS: Baseline neuropathy: pt. Experiences numbness and tingling in bilateral lower extremities and right index finger.   Dressing: R hip dressing, guaze covered with tegaderm, clean, dry and intact.   Diet: Regular diet, patient tolerated well and ate 90% of meal, carbohydrate counts.   LDA: PIV in left hand became dislodged and was removed. R hip incision dressing CDI.    Equipment: Personal belongings, bedside commode.    Plan: Pain management.    Additional Info:

## 2023-06-04 NOTE — PLAN OF CARE
Patient A/Ox4. VSS. Denies CP, SOB, dizziness/LH. LSCTA. +fl/BS. Voiding frequently and with urgency in commode. CMS intact ex some baseline numbness and tingling. Dressing to hip CDI, ice applied periodically. Tolerating regular diet without NV. Activity level is fair, pt up with 1 assist to commode. Pain rated as horrid throughout shift, managed with oxycodone, atarax, zanaflex and nothing seems to really work, pt writhing around in bed in pain; ortho paged. Patient has demonstrated ability to call appropriately. Patient is resting with call light within reach. Will continue to monitor.

## 2023-06-05 ENCOUNTER — DOCUMENTATION ONLY (OUTPATIENT)
Dept: ORTHOPEDICS | Facility: CLINIC | Age: 83
End: 2023-06-05
Payer: MEDICARE

## 2023-06-05 VITALS
HEART RATE: 61 BPM | BODY MASS INDEX: 28.49 KG/M2 | DIASTOLIC BLOOD PRESSURE: 48 MMHG | WEIGHT: 177.25 LBS | RESPIRATION RATE: 16 BRPM | OXYGEN SATURATION: 97 % | HEIGHT: 66 IN | TEMPERATURE: 96.9 F | SYSTOLIC BLOOD PRESSURE: 137 MMHG

## 2023-06-05 LAB
GLUCOSE BLDC GLUCOMTR-MCNC: 149 MG/DL (ref 70–99)
GLUCOSE BLDC GLUCOMTR-MCNC: 259 MG/DL (ref 70–99)
LACTATE SERPL-SCNC: 1.8 MMOL/L (ref 0.7–2)

## 2023-06-05 PROCEDURE — 250N000013 HC RX MED GY IP 250 OP 250 PS 637: Performed by: STUDENT IN AN ORGANIZED HEALTH CARE EDUCATION/TRAINING PROGRAM

## 2023-06-05 PROCEDURE — 83605 ASSAY OF LACTIC ACID: CPT | Performed by: INTERNAL MEDICINE

## 2023-06-05 PROCEDURE — 99214 OFFICE O/P EST MOD 30 MIN: CPT | Performed by: STUDENT IN AN ORGANIZED HEALTH CARE EDUCATION/TRAINING PROGRAM

## 2023-06-05 PROCEDURE — 250N000013 HC RX MED GY IP 250 OP 250 PS 637: Performed by: CLINICAL NURSE SPECIALIST

## 2023-06-05 PROCEDURE — 36415 COLL VENOUS BLD VENIPUNCTURE: CPT | Performed by: INTERNAL MEDICINE

## 2023-06-05 PROCEDURE — 250N000013 HC RX MED GY IP 250 OP 250 PS 637

## 2023-06-05 PROCEDURE — 250N000012 HC RX MED GY IP 250 OP 636 PS 637: Performed by: STUDENT IN AN ORGANIZED HEALTH CARE EDUCATION/TRAINING PROGRAM

## 2023-06-05 PROCEDURE — 82962 GLUCOSE BLOOD TEST: CPT

## 2023-06-05 RX ORDER — AMOXICILLIN 250 MG
1 CAPSULE ORAL 2 TIMES DAILY
Qty: 30 TABLET | Refills: 0 | DISCHARGE
Start: 2023-06-05 | End: 2023-08-01

## 2023-06-05 RX ORDER — HYDROMORPHONE HYDROCHLORIDE 2 MG/1
2 TABLET ORAL EVERY 4 HOURS PRN
Qty: 26 TABLET | Refills: 0 | Status: SHIPPED | OUTPATIENT
Start: 2023-06-05 | End: 2023-08-01

## 2023-06-05 RX ORDER — HYDROXYZINE HYDROCHLORIDE 25 MG/1
25 TABLET, FILM COATED ORAL EVERY 6 HOURS PRN
Qty: 40 TABLET | Refills: 0 | DISCHARGE
Start: 2023-06-05 | End: 2023-08-01

## 2023-06-05 RX ORDER — HYDROMORPHONE HYDROCHLORIDE 2 MG/1
2 TABLET ORAL EVERY 4 HOURS PRN
Qty: 26 TABLET | Refills: 0 | Status: SHIPPED | OUTPATIENT
Start: 2023-06-05 | End: 2023-06-05

## 2023-06-05 RX ORDER — POLYETHYLENE GLYCOL 3350 17 G/17G
17 POWDER, FOR SOLUTION ORAL DAILY
Qty: 10 PACKET | Refills: 0 | DISCHARGE
Start: 2023-06-05

## 2023-06-05 RX ORDER — TIZANIDINE 2 MG/1
2 TABLET ORAL EVERY 6 HOURS PRN
Qty: 30 TABLET | Refills: 0 | DISCHARGE
Start: 2023-06-05 | End: 2023-08-01

## 2023-06-05 RX ORDER — ACETAMINOPHEN 500 MG
1000 TABLET ORAL EVERY 8 HOURS PRN
Qty: 60 TABLET | Refills: 0 | DISCHARGE
Start: 2023-06-05 | End: 2024-05-23

## 2023-06-05 RX ADMIN — TORSEMIDE 40 MG: 20 TABLET ORAL at 08:33

## 2023-06-05 RX ADMIN — PANTOPRAZOLE SODIUM 40 MG: 40 TABLET, DELAYED RELEASE ORAL at 08:33

## 2023-06-05 RX ADMIN — POLYETHYLENE GLYCOL 3350 17 G: 17 POWDER, FOR SOLUTION ORAL at 08:34

## 2023-06-05 RX ADMIN — METOPROLOL SUCCINATE 50 MG: 25 TABLET, EXTENDED RELEASE ORAL at 08:34

## 2023-06-05 RX ADMIN — ASPIRIN 81 MG: 81 TABLET, COATED ORAL at 08:34

## 2023-06-05 RX ADMIN — HYDROMORPHONE HYDROCHLORIDE 2 MG: 2 TABLET ORAL at 05:41

## 2023-06-05 RX ADMIN — CYANOCOBALAMIN TAB 1000 MCG 1000 MCG: 1000 TAB at 08:33

## 2023-06-05 RX ADMIN — FLUTICASONE FUROATE AND VILANTEROL TRIFENATATE 1 PUFF: 100; 25 POWDER RESPIRATORY (INHALATION) at 09:51

## 2023-06-05 RX ADMIN — PREDNISONE 5 MG: 5 TABLET ORAL at 08:34

## 2023-06-05 RX ADMIN — Medication 100 MCG: at 08:34

## 2023-06-05 RX ADMIN — TIZANIDINE 4 MG: 4 TABLET ORAL at 10:00

## 2023-06-05 RX ADMIN — POTASSIUM CHLORIDE 40 MEQ: 1500 TABLET, EXTENDED RELEASE ORAL at 08:33

## 2023-06-05 RX ADMIN — SENNOSIDES AND DOCUSATE SODIUM 1 TABLET: 50; 8.6 TABLET ORAL at 08:34

## 2023-06-05 RX ADMIN — HYDROXYZINE HYDROCHLORIDE 25 MG: 25 TABLET, FILM COATED ORAL at 01:11

## 2023-06-05 RX ADMIN — ESCITALOPRAM OXALATE 20 MG: 20 TABLET, FILM COATED ORAL at 08:34

## 2023-06-05 RX ADMIN — ACETAMINOPHEN 975 MG: 325 TABLET ORAL at 08:33

## 2023-06-05 RX ADMIN — TIZANIDINE 4 MG: 4 TABLET ORAL at 03:52

## 2023-06-05 RX ADMIN — HYDROXYZINE HYDROCHLORIDE 50 MG: 50 TABLET, FILM COATED ORAL at 08:41

## 2023-06-05 RX ADMIN — LORATADINE 10 MG: 10 TABLET ORAL at 08:33

## 2023-06-05 RX ADMIN — HYDROMORPHONE HYDROCHLORIDE 4 MG: 4 TABLET ORAL at 02:34

## 2023-06-05 RX ADMIN — HYDROXYCHLOROQUINE SULFATE 400 MG: 200 TABLET, FILM COATED ORAL at 08:33

## 2023-06-05 ASSESSMENT — ACTIVITIES OF DAILY LIVING (ADL)
ADLS_ACUITY_SCORE: 33
ADLS_ACUITY_SCORE: 36
ADLS_ACUITY_SCORE: 33
ADLS_ACUITY_SCORE: 33

## 2023-06-05 NOTE — PROGRESS NOTES
"Orthopedic Surgery Progress Note: 06/05/2023    Subjective:   No acute events overnight. Pain persistent; groin pain unchanged from before surgery. Tolerating a diet. Voiding spontaneously. Not yet moving bowels. No new concerns or complaints.    Objective:   BP (!) 157/65 (BP Location: Right arm)   Pulse 57   Temp 97.8  F (36.6  C) (Oral)   Resp 16   Ht 1.664 m (5' 5.51\")   Wt 80.4 kg (177 lb 4 oz)   SpO2 96%   BMI 29.04 kg/m    No intake/output data recorded.  General: NAD. Resting comfortably in bed.  Respiratory: Breathing comfortably on RA.  Drain Output: No drain.  Musculoskeletal: Focused exam of the right lower extremity: Fires GSC, TA, FHL, and EHL. SILT Sa/Javier/SP/DP/T nerve distributions. Brisk capillary refill.     Laboratory Data:  Lab Results   Component Value Date    WBC 9.2 06/01/2023    HGB 10.6 (L) 06/02/2023     (L) 06/01/2023    INR 1.43 (H) 11/10/2022      Assessment & Plan:   Betty Tee is a 83 year old female status post right total hip arthroplasty on 5/31/2023 with Dr. Shannon.     Goals for Today:  - Pain control and mobilization  - Discharge planning    Orthopedic Surgery primary  - Activity: Up with assist until independent. Posterior hip precautions x 3 months (No hip flexion beyond 90 degrees, no adduction beyond midline, no internal rotation beyond neutral).  - Weightbearing Status: WBAT RLE.  - Pain Management: Transition from IV to PO as tolerated.  - Antibiotics: Ancef 2 g Q8H x 24 hours.  - Diet: Begin with clear fluids and progress diet as tolerated.   - DVT Prophylaxis: SCDs and aspirin 162 mg daily x 4 weeks.  - Imaging: None pending.  - Labs: Labs PRN.  - Bracing/Splinting: None.  - Dressings: Keep dressing C/D/I x 2 weeks.  - Drains: No drain.  - Kilpatrick Catheter: None.   - Physical Therapy/Occupational Therapy: Evaluation and treatment.  - Cultures: None.   - Consults: Hospitalist.  - Follow-up: Clinic with Destinee Panchal PA-C, in 2 weeks without " radiographs.    - Disposition: Pending progress with therapies, pain control on orals, and medical stability; anticipate discharge to TCU today vs tomorrow.    Orthopedic Surgery staff for this patient is Dr. Shannon.    ------------------------------------------------------------------------------------------    Edmundo Sood MD  Orthopedic Surgery PGY4

## 2023-06-05 NOTE — PLAN OF CARE
Physical Therapy Discharge Summary    Reason for therapy discharge:    Discharged to transitional care facility.    Progress towards therapy goal(s). See goals on Care Plan in [facility] electronic health record for goal details.  Goals partially met.  Barriers to achieving goals:   discharge from facility.    Therapy recommendation(s):    Continued therapy is recommended.  Rationale/Recommendations:  Rec TCU for further PT to progress per SRI protocol.

## 2023-06-05 NOTE — PROGRESS NOTES
Received Completed forms Yes   Faxed Forms Faxed To: mounika cooley   Fax Number: [phone]   Sent to HIM (Date) 6/2/23

## 2023-06-05 NOTE — PLAN OF CARE
"[id number]  BP (!) 157/65 (BP Location: Right arm)   Pulse 57   Temp 97.8  F (36.6  C) (Oral)   Resp 16   Ht 1.664 m (5' 5.51\")   Wt 80.4 kg (177 lb 4 oz)   SpO2 96%   BMI 29.04 kg/m       AOx4, pleasant. ORA. Denies CP, SOB, N/V. Reports N/T to RUE and RLE. Endorsing pain to RLE radiating to groin; managed with around the clock dosing, heat/cold applied. Ortho paged for increased spasms, Zanaflex dose modified- very effective after administration per pt. Surgical dressing CDI. Skin intact ex bruising.     A1 FWW GB. Hip prec. Up in chair, ambulated halls with Writer. Steady, however shaky/stiff gait at times. Up to BR/BSC, on Demadex. Can be incontinent of bladder at times, none this shift. LBM 6/4.    Reg diet, tolerating; carb counts. BG checks- bedtime 159, overnight 149. Takes pills a couple at a time.    No IV access.     Triggered sepsis protocol this shift, lactic 1.8.    Pt able to make needs known. Call light within reach. Care ongoing.     Plan: Possible discharge today to TCU. SW following.     "

## 2023-06-05 NOTE — PROGRESS NOTES
"  VS: /48   Pulse 61   Temp 96.9  F (36.1  C)   Resp 16   Ht 1.664 m (5' 5.51\")   Wt 80.4 kg (177 lb 4 oz)   SpO2 97%   BMI 29.04 kg/m     O2: Room air saturations 97%.    Output: Pt up to bathroom to void. Denies issues with voiding   Last BM: 6/4/2023 reports patient   Activity: Pt up with walker and assist of 1 and gait belt.    Skin: Right hip dressing is clean dry and intact   Pain: Pt is using PO Dilaudid , tylenol, Zanaflex and Vistaril for pain management.   CMS: Intact   Dressing: Right hip dressing is CDI   Diet: Regular. Checking blood sugars and covering with insulin .    LDA: IV SL was discontinued.   Equipment: Walker, IS, gait belt.    Plan: Pt to be discharged to [facility] today. Patient had all her belongings returned to her to be discharged. Packet was sent with patient with papers and hard copies of scripts to be filled at rehab.    Additional Info: Patient went by stretcher and all her friends are aware of her discharge and are meeting her over at the new facility. Patient seems to have a full understanding of plan of cares for discharge today.        "

## 2023-06-05 NOTE — PROGRESS NOTES
Orthopaedic Surgery Progress Note 06/05/2023    Interval Events  - AFVSS    Subjective  No acute events overnight.  Pain controlled. Denies new numbness, tingling, or weakness.  Tolerating diet without nausea or vomiting.  Voiding spontaneously.  +flatus.    Objective  Temp: 97.8  F (36.6  C) Temp src: Oral BP: (!) 157/65 Pulse: 57   Resp: 16 SpO2: 96 % O2 Device: None (Room air)      Exam:  Gen: No acute distress, resting comfortably in bed.  Resp: Non-labored breathing  Cardio: Regular rate via peripheral pulse  MSK:  RLE:  - Dressings c/d/I  - Fires TA, GSC, EHL, FHL  - SILT tibial/sural/saphenous/DP/SP nerves, mildly diminished DP/SP  - PT/DP pulses 2+, foot wwp    Recent Labs   Lab 06/02/23  0611 06/01/23  0629   WBC  --  9.2   HGB 10.6* 10.1*   PLT  --  140*       Imaging: Postop XR with stable implant alignment, no fractures/dislocations    Assessment: Betty Tee is a 83 year old female s/p R SRI on 5/31/2023 with Dr. Shannon.     Today:  - Pain control  - PT/OT  - Awaiting TCU -- ok to discharge from orthopaedics perspective once placement attained     Plan:  Activity: Up with assist and assistive devices as needed until independent. Posterior hip precautions to operative side x 3 months:  1) No hip flexion beyond 90 degrees  2) No adduction beyond midline  3) No internal rotation beyond neutral   Weight bearing status: WBAT  Antibiotics: s/p Cefazolin x 24 hours  Diet: Begin with clear fluids and progress diet as tolerated. Bowel regimen. Anti-emetics PRN.    DVT prophylaxis:  mg x 4 weeks beginning POD 1   Elevation: Elevate heels off of bed on pillows   Wound Care: Tegaderm and alginate x 2 weeks   Drains: none  Pain management: Orals PRN, IV for breakthrough only  X-rays: AP Pelvis and Lateral operative hip in PACU -- complete  Physical Therapy: Mobilization, ROM, ADL's, Posterior hip precautions.    Occupational Therapy: ADL's  Labs: Trend Hgb on POD #1, 2  Consults: PT, OT. Hospitalist,  appreciate assistance in caring for this patient throughout the perioperative period    Tom Head MD  Orthopaedic Surgery PGY-4  [phone]    Please page me at [phone] with any questions/concerns. If there is no response, if it is a weekend, or if it is during evening hours then please page the orthopaedic surgery resident on call.       Future Appointments   Date Time Provider Department Center   6/5/2023 11:00 AM Mya Wylie, PT [facility] [place]   6/5/2023  7:00 PM UR OT WAITLIST [facility] [place]   6/16/2023  1:00 PM Destinee Panchal PA-C [facility]   7/20/2023 10:20 AM Thomas Shannon MD [facility]   7/21/2023  2:00 PM Zulma Lopez MD [facility]   8/1/2023  2:00 PM Ilene Tristan MD [facility]   9/7/2023 12:45 PM  LAB [facility]CLABR [facility]   9/7/2023  1:00 PM UCECHCR2 [facility]   9/7/2023  2:00 PM Radha Rosa MD [facility]   11/16/2023 11:30 AM  PFL A PFT [facility]   11/16/2023 12:00 PM Maryjane Tillman MD [facility]   4/16/2024  2:00 PM Ilene Tristan MD [facility]

## 2023-06-05 NOTE — PROGRESS NOTES
Care Management Discharge Note    Discharge Date: 06/05/2023       Discharge Disposition: Skilled Nursing Facility    [facility] [address] [place] [address]  Jessica, Narayan  Phone [phone]  Fax [phone]    Discharge DME:  Walker    Discharge Transportation: [facility], stretcher transport    Private pay costs discussed: transportation costs    Does the patient's insurance plan have a 3 day qualifying hospital stay waiver?  Yes   Will the waiver be used for post-acute placement? Yes    PAS Confirmation Code:  [id number]  Patient/family educated on Medicare website which has current facility and service quality ratings:  Yes    Education Provided on the Discharge Plan: Yes  Persons Notified of Discharge Plans: patient and health care agent, Jade  Patient/Family in Agreement with the Plan: yes    Handoff Referral Completed: Yes    Additional Information:  Met with patient and her friends, Nghia and Yvette at bedside this morning to finalize discharge plans. Discharge to [facility] in [place]. Transport to be provided by [facility] at 10:00am. Patient is agreeable to the plan. All orders faxed. No further discharge concerns. RNCC available as needed.    Laura Rudolph RN, BSN  Care Coordinator, 5 Ortho  Phone [phone]  Pager [phone]

## 2023-06-05 NOTE — PROGRESS NOTES
"[facility]    Medicine Progress Note - Hospitalist Service, GOLD TEAM 21    Date of Admission:  5/31/2023    Assessment & Plan   Betty Tee is a 83 year old female admitted on 5/31/2023 after right total hip arthoplasty.     Interval Changes:  -Medically stable for discharge, resume home insulin regiment on discharge to TCU    #S/p right total hip arthoplasty  #Osteoarthrtiis  -S/p right total hip arthoplasty on 5/31 with orthopaedics  -Posterior hip preacuations x 3 months on operative side  -ASA ppx x 4 weeks  -Pain management per orthopaedics - added scheduled tylenol  -DVT ppx per orthopaedics    #Type 2 DM -   -requiring insulin drip after OR  -Sliding scale insulin PTA  -Lantus 5 units, aspart 3 unitd TID AC, aspart MDCF with meals during hospital stay, resume sliding scale with meals on discharge    #HFpEf -  Follows with [facility] clinic, Dr. Rosa most recent EF 55-60%  Plan:  > Continue torsemide 40 mg qAM, 20 mg qPM     #Atrial Fibrillation s/p watchman procedure - Continue metoprolol  #Gout - allopurinol  #HLD - atorvastatin  #ILD, Sjogrens follicular bronchiolitis - Breo, montelukast, prednisone 5 mg, azithromycin 250 mg,   #Depression - lexapro  #Sjogrens syndrome - Plaquenil  #ANGELICA - CPAP         Diet: Advance Diet as Tolerated: Regular Diet Adult  Discharge Instruction - Regular Diet Adult    DVT Prophylaxis: aspirin per ortho  Kilpatrick Catheter: Not present  Lines: None     Cardiac Monitoring: None  Code Status: Full Code      Clinically Significant Risk Factors Present on Admission                # Drug Induced Platelet Defect: home medication list includes an antiplatelet medication   # Hypertension: Noted on problem list     # DMII: A1C = 7.3 % (Ref range: <5.7 %) within past 6 months    # Overweight: Estimated body mass index is 29.04 kg/m  as calculated from the following:    Height as of this encounter: 1.664 m (5' 5.51\").    Weight as of " this encounter: 80.4 kg (177 lb 4 oz).            Disposition Plan     Expected Discharge Date: 06/05/2023,  3:00 PM    Destination: inpatient rehabilitation facility  Discharge Comments: TCU, pain control          Edmundo Rawls MD  Hospitalist Service, GOLD TEAM 21  [facility]  Securely message with [employer] (more info)  Text page via [employer] Paging/Directory   See signed in provider for up to date coverage information  ______________________________________________________________________    Interval History   No acute events overnight. Felt she had less crisis moments. Discussed planf or medications at bedtime with melatonin as well as hydroxyzine with her pain medications. Discussed if still insomnia present then utilizing her trazadone. No fever or chills. No chest pain. No shortness of breath. She and her friends are thankful for her care.    Physical Exam   Vital Signs: Temp: 97.8  F (36.6  C) Temp src: Oral BP: (!) 157/65 Pulse: 57   Resp: 16 SpO2: 96 % O2 Device: None (Room air)    Weight: 177 lbs 4 oz    Constitutional: alert, oriented, sitting in chair  Eyes: no icterus  ENT: no elevated JVP  CV: RRR  Respiratory: crackles in left posterior lobe, no wheezing, no tachypnea, no respiratory distress  GI: soft, protuberant, non-tender, bowel sounds present  Musculoskeletal: right hip incision C/D/I with dressing in place,   Neurologic: diminshed sensation in bilatearl lower soles of feet    Medical Decision Making             Data     I have personally reviewed the following data over the past 24 hrs:    Procal: N/A CRP: N/A Lactic Acid: 1.8

## 2023-06-06 DIAGNOSIS — N95.2 ATROPHIC VAGINITIS: ICD-10-CM

## 2023-06-06 NOTE — DISCHARGE SUMMARY
ORTHOPAEDIC SURGERY DISCHARGE SUMMARY     Date of Admission: 5/31/2023  Date of Discharge: 6/5/2023 10:00 AM  Disposition: Transitional Care Facility  Staff Physician: Thomas Shannon MD  Primary Care Provider: Ilene Tristan      ADMISSION DIAGNOSIS:  Primary localized osteoarthritis of right hip [M16.11]    DISCHARGE DIAGNOSIS:  Primary localized osteoarthritis of right hip [M16.11]    PROCEDURES: Procedure(s):  Right total hip arthroplasty on 5/31/2023    BRIEF HISTORY:  The patient has a long history of debilitating pain secondary to ostearthritis of the hip.  Despite comprehensive non-operative management these symptoms continued to interfere with activities of daily living.  After discussion of further treatment options including the risks and benefits that patient elected to proceed with a total hip.    HOSPITAL COURSE:    The patient was admitted following the above listed procedures for pain control and rehabilitation. Betty Tee did well post-operatively. Medicine was consulted post operatively to aid in management of medical co-morbidities. The patient received routine nursing cares and at the time of discharge was medically stable. Vital signs were stable throughout admission. The patient is tolerating a regular diet and is voiding spontaneously. All PT/OT goals have been met for safe mobility. Pain is now controlled on oral medications which will be available on discharge. Stool softeners have been used while taking pain medications to help prevent constipation. Betty Tee is deemed medically safe to discharge on POD5.     Antibiotics:  Ancef given periop and 24 hours postop.   DVT prophylaxis:  ASA 162mg daily x4 weeks initiated after surgery  PT Progress:  Has met PT/OT goals for safe mobility.    Pain Meds:  Weaned off all IV pain meds by discharge.  Inpatient Events: No significant events or complications.     PHYSICAL EXAM:    Gen: No acute distress, resting comfortably in  bed.  Resp: Non-labored breathing  Cardio: Regular rate via peripheral pulse  MSK:  RLE:  - Dressings c/d/I  - Fires TA, GSC, EHL, FHL  - SILT tibial/sural/saphenous/DP/SP nerves, mildly diminished DP/SP  - PT/DP pulses 2+, foot wwp    FOLLOWUP:    Follow up with Destinee Panchal PA-C on 6/16/2023.    Future Appointments   Date Time Provider Department Center   6/16/2023  1:00 PM Destinee Panchal PA-C [facility]   7/20/2023 10:20 AM Thomas Shannon MD [facility]   7/21/2023  2:00 PM Zulma Lopez MD [facility]   8/1/2023  2:00 PM Ilene Tristan MD [facility]   9/7/2023 12:45 PM  LAB UCLABR [facility]   9/7/2023  1:00 PM UCECHCR2 [facility]   9/7/2023  2:00 PM Radha Rosa MD [facility]   11/16/2023 11:30 AM UC PFL A PFT [facility]   11/16/2023 12:00 PM Maryjane Tillman MD [facility]   4/16/2024  2:00 PM Ilene Tristan MD [facility]       Orthopaedic Surgery appointments are at the [facility] ([address] [place] [address]). Call [phone] to schedule a follow-up appointment at this location with your provider.     PLANNED DISCHARGE ORDERS:     DVT Prophylaxis: ASA 162mg daily x4 weeks     Activity: WBAT RLE.  Up with assist and assistive devices as needed until independent. Posterior hip precautions to operative side x 3 months:  1) No hip flexion beyond 90 degrees  2) No adduction beyond midline  3) No internal rotation beyond neutral      Wound Care: see Below      Discharge Medication List as of 6/5/2023 10:01 AM      START taking these medications    Details   HYDROmorphone (DILAUDID) 2 MG tablet Take 1 tablet (2 mg) by mouth every 4 hours as needed for moderate pain, Disp-26 tablet, R-0, Local Print         CONTINUE these medications which have CHANGED    Details   acetaminophen (TYLENOL) 500 MG tablet Take 2 tablets (1,000 mg) by mouth every 8 hours as needed (max 6 tablets/24 hours, 2 tablets/dose), Disp-60 tablet, R-0, Transitional       albuterol (PROAIR HFA/PROVENTIL HFA/VENTOLIN HFA) 108 (90 Base) MCG/ACT inhaler Inhale 2 puffs into the lungs every 6 hours as needed for wheezing or shortness of breath, TransitionalPharmacy may dispense brand covered by insurance (Proair, or proventil or ventolin or generic albuterol inhaler)      aspirin (ASA) 81 MG EC tablet Take 1 tablet (81 mg) by mouth 2 times daily, Disp-60 tablet, R-0, E-Prescribe      cevimeline (EVOXAC) 30 MG capsule Take 1 capsule (30 mg) by mouth 3 times daily as needed, Historical      cyanocobalamin (VITAMIN B-12) 1000 MCG tablet Take 1 tablet (1,000 mcg) by mouth three times a week, Transitional      ferrous gluconate (FERGON) 324 (38 Fe) MG tablet Take 1 tablet (324 mg) by mouth three times a week, Transitional      hydrOXYzine (ATARAX) 25 MG tablet Take 1 tablet (25 mg) by mouth every 6 hours as needed for other (adjuvant pain), Disp-40 tablet, R-0, Transitional      polyethylene glycol (MIRALAX) 17 g packet Take 17 g by mouth daily, Disp-10 packet, R-0, Transitional      senna-docusate (SENOKOT-S/PERICOLACE) 8.6-50 MG tablet Take 1 tablet by mouth 2 times daily, Disp-30 tablet, R-0, Transitional      tiZANidine (ZANAFLEX) 2 MG tablet Take 1 tablet (2 mg) by mouth every 6 hours as needed for muscle spasms, Disp-30 tablet, R-0, Transitional         CONTINUE these medications which have NOT CHANGED    Details   acyclovir (ZOVIRAX) 400 MG tablet Take 1 tablet (400 mg) by mouth every 8 hours For a couple days, Disp-15 tablet, R-2, E-Prescribe      acyclovir (ZOVIRAX) 5 % external ointment Apply topically as needed (6 times day PRN for outbreaks) As needed for outbreaksDisp-15 g, [id number]      alendronate (FOSAMAX) 70 MG tablet Take 1 tablet (70 mg) by mouth every 7 days, Disp-12 tablet, R-1, E-Prescribe      allopurinol (ZYLOPRIM) 100 MG tablet Take 1 tablet (100 mg) by mouth daily, Disp-90 tablet, R-1, E-Prescribe      anakinra (KINERET) 100 MG/0.67ML SOSY injection 100 mg  every day x 3 days as needed for flare ups, Disp-6.7 mL, R-3, E-Prescribe      atorvastatin (LIPITOR) 10 MG tablet TAKE 1/2 TABLET BY MOUTH EVERY DAY, Disp-45 tablet, R-3, E-Prescribe      azithromycin (ZITHROMAX) 250 MG tablet TAKE 1 TABLET BY MOUTH EVERY DAY, Disp-30 tablet, R-5, E-Prescribe      BREO ELLIPTA 100-25 MCG/ACT inhaler TAKE 1 PUFF BY MOUTH EVERY DAY, Disp-1 each, R-11, E-PrescribeOK for 90 day fill if pt  wanted      Cholecalciferol (VITAMIN D3) 50 MCG (2000 UT) CAPS TAKE 100 MCG BY MOUTH DAILY (TAKE 2 TABLET (50 MCG) BY MOUTH DAILY - ORAL), Disp-180 capsule, R-0, E-Prescribe      COMPOUNDED NON-CONTROLLED SUBSTANCE (CMPD RX) - PHARMACY TO MIX COMPOUNDED MEDICATION Estriol 1 mg/g Apply small amount to finger and apply to inside vagina daily for 2 weeks then twice weekly Route: vaginally Dispense 30 grams 11 refills, Disp-30 g, R-11, E-Prescribe      escitalopram (LEXAPRO) 20 MG tablet TAKE 1 TABLET BY MOUTH EVERY DAY, Disp-90 tablet, R-1, E-Prescribe      fluticasone (FLONASE) 50 MCG/ACT nasal spray SPRAY 1-2 SPRAYS INTO BOTH NOSTRILS DAILY AS NEEDED FOR ALLERGIES, Disp-16 mL, R-11, E-Prescribe      hydroxychloroquine (PLAQUENIL) 200 MG tablet Take 2 tablets (400 mg) by mouth daily Get annual eye exams for hydroxychloroquine (Plaquenil) monitoring and fax to [phone], Disp-180 tablet, R-3, E-Prescribe      insulin lispro (HUMALOG KWIKPEN) 100 UNIT/ML (1 unit dial) KWIKPEN Inject Subcutaneous 3 times daily (before meals) Sliding scale insulin; tests BG three times day  If BG <150, no insulin given   If -200 take 2 units  If -250 take 4 units  If -300 take 6 units  If -350 take 10 units  If BG >350 t elio 14 units, Historical      ketoconazole (NIZORAL) 2 % external cream Apply topically 2 times daily as needed for itchingDisp-60 g, [id number]      KLOR-CON 20 MEQ CR tablet TAKE 2 TABLETS (40 MEQ) BY MOUTH EVERY MORNING AND 1 TABLET (20 MEQ) EVERY EVENING., Disp-270  tablet, R-3, E-Prescribe      lidocaine (LIDODERM) 5 % patch APPLY PATCH TO PAINFUL AREA FOR UP TO 12 H WITHIN A 24 H PERIOD. REMOVE AFTER 12 HOURS.Disp-30 patch, [id number] Code Needed  Sacral back pain [M53.3]      loratadine (CLARITIN) 10 MG tablet TAKE 1 TABLET BY MOUTH EVERY DAY, Disp-90 tablet, R-3, E-Prescribe      methocarbamol (ROBAXIN) 750 MG tablet Take 1 tablet (750 mg) by mouth 3 times daily as needed for muscle spasms, Disp-90 tablet, R-3, E-Prescribe      metoprolol succinate ER (TOPROL XL) 50 MG 24 hr tablet Take 1 tablet (50 mg) by mouth 2 times daily, Disp-180 tablet, R-3, E-Prescribe      montelukast (SINGULAIR) 10 MG tablet TAKE 1 TABLET BY MOUTH EVERYDAY AT BEDTIME, Disp-90 tablet, R-1, E-Prescribe      pantoprazole (PROTONIX) 40 MG EC tablet Take 1 tablet (40 mg) by mouth daily (replaces famotidine- should stop taking famotidine), Disp-90 tablet, R-3, E-Prescribe      predniSONE (DELTASONE) 5 MG tablet Take 1 tablet (5 mg) by mouth daily, Disp-90 tablet, R-1, E-Prescribe      torsemide (DEMADEX) 20 MG tablet Take 2 tablets (40 mg) by mouth every morning AND 1 tablet (20 mg) every evening., Disp-270 tablet, R-3, No Print Out      ACCU-CHEK SHANNON PLUS test strip USE TO TEST BLOOD SUGARS 3 TIMES DAILY, Disp-300 strip, R-5, E-Prescribe      BD PEN NEEDLE JANE 2ND GEN 32G X 4 MM miscellaneous USE TO TEST 4 TIMES A DAY, Disp-400 each, R-1, E-Prescribe      blood glucose (NO BRAND SPECIFIED) lancets standard Use to test blood sugar 3 times daily or as directedDisp-100 lancet, [id number] click lancets      OZEMPIC, 1 MG/DOSE, 4 MG/3ML pen INJECT 1 MG SUBCUTANEOUS ONCE A WEEK, Disp-9 mL, R-1, E-Prescribe         STOP taking these medications       oxyCODONE (ROXICODONE) 5 MG tablet Comments:   Reason for Stopping:         oxyCODONE-acetaminophen (PERCOCET) 7.5-325 MG per tablet Comments:   Reason for Stopping:         traZODone (DESYREL) 50 MG tablet Comments:   Reason for Stopping:       "           Discharge Procedure Orders   Reason for your hospital stay   Order Comments: Total hip arthroplasty with Dr. Shannon     When to call - Contact Surgeon Team   Order Comments: You may experience symptoms that require follow-up before your scheduled appointment. Refer to the \"[employer]\" for instructions on when to contact your Surgeon Team if you are concerned about pain control, blood clots, constipation, or if you are unable to urinate.     When to call - Reach out to Urgent Care   Order Comments: If you are not able to reach your Surgeon Team and you need immediate care, go to the Orthopedic Walk-in Clinic or Urgent Care at your Surgeon's office.  Do NOT go to the Emergency Room unless you have shortness of breath, chest pain, or other signs of a medical emergency.     When to call - Reasons to Call 911   Order Comments: Call 911 immediately if you experience sudden-onset chest pain, arm weakness/numbness, slurred speech, or shortness of breath     Discharge Instruction - Breathing exercises   Order Comments: Perform breathing exercises using your Incentive Spirometer 10 times per hour while awake for 2 weeks.     Symptoms - Fever Management   Order Comments: A low grade fever can be expected after surgery.  Use acetaminophen (TYLENOL) as needed for fever management.  Contact your Surgeon Team if you have a fever greater than 101.5 F, chills, and/or night sweats.     Symptoms - Constipation management   Order Comments: Constipation (hard, dry bowel movements) is expected after surgery due to the combination of being less active, the anesthetic, and the opioid pain medication.  You can do the following to help reduce constipation:  ~  FLUIDS:  Drink clear liquids (water or Gatorade), or juice (apple/prune).  ~  DIET:  Eat a fiber rich diet.    ~  ACTIVITY:  Get up and move around several times a day.  Increase your activity as you are able.  MEDICATIONS:  Reduce the risk of constipation by starting " medications before you are constipated.  You can take Miralax   (1 packet as directed) and/or a stool softener (Senokot 1-2 tablets 1-2 times a day).  If you already have constipation and these medications are not working, you can get magnesium citrate and use as directed.  If you continue to have constipation you can try an over the counter suppository or enema.  Call your Surgeon Team if it has been greater than 3 days since your last bowel movement.     Symptoms - Reduced Urine Output   Order Comments: Changes in the amount of fluids you drank before and after surgery may result in problems urinating.  It is important to stay well-hydrated after surgery and drink plenty of water. If it has been greater than 8 hours since you have urinated despite drinking plenty of water, call your Surgeon Team.     Activity - Exercises to prevent blood clots   Order Comments: Unless otherwise directed by your Surgeon team, perform the following exercises at least three times per day for the first four weeks after surgery to prevent blood clots in your legs: 1) Point and flex your feet (Ankle Pumps), 2) Move your ankle around in big circles, 3) Wiggle your toes, 4) Walk, even for short distances, several times a day, will help decrease the risk of blood clots.     Order Specific Question Answer Comments   Is discharge order? Yes      Comfort and Pain Management - Pain after Surgery   Order Comments: Pain after surgery is normal and expected.  You will have some amount of pain for several weeks after surgery.  Your pain will improve with time.  There are several things you can do to help reduce your pain including: rest, ice, elevation, and using pain medications as needed. Contact your Surgeon Team if you have pain that persists or worsens after surgery despite rest, ice, elevation, and taking your medication(s) as prescribed. Contact your Surgeon Team if you have new numbness, tingling, or weakness in your operative extremity.  "    Comfort and Pain Management - Swelling after Surgery   Order Comments: Swelling and/or bruising of the surgical extremity is common and may persist for several months after surgery. In addition to frequent icing and elevation, gentle compressive support with an ACE wrap or tubigrip may help with swelling. Apply compression regularly, removing at least twice daily to perform skin checks. Contact your Surgeon Team if your swelling increases and is NOT associated with an increase in your activity level, or if your swelling increases and is associated with redness and pain.     Comfort and Pain Management - LOWER Extremity Elevation   Order Comments: Swelling is expected for several months after surgery. This type of swelling is usually associated with gravity and activity, and can be improved with elevation.   The best way to do this is to get your \"toes above your nose\" by laying down and placing several pillows lengthwise under your calf and heel. When elevating your leg keep your knee completely straight. Perform this elevation as often as possible especially for the first two weeks after surgery.     Comfort and Pain Management - Cold therapy   Order Comments: Ice can be used to control swelling and discomfort after surgery. Place a thin towel over your operative site and apply the ice pack overtop. Leave ice pack in place for 20 minutes, then remove for 20 minutes. Repeat this 20 minutes on/20 minutes off routine as often as tolerated.     Medication Instructions - Acetaminophen (TYLENOL) Instructions   Order Comments: You were discharged with acetaminophen (TYLENOL) for pain management after surgery. Acetaminophen most effectively manages pain symptoms when it is taken on a schedule without missing doses (every four, six, or eight hours). Your Provider will prescribe a safe daily dose between 3000 - 4000 mg.  Do NOT exceed this daily dose. Most patients use acetaminophen for pain control for the first four " weeks after surgery.  You can wean from this medication as your pain decreases.     Medication Instructions - NSAID Instructions   Order Comments: You were discharged with an anti-inflammatory medication for pain management to use in combination with acetaminophen (TYLENOL) and the narcotic pain medication.  Take this medication exactly as directed.  You should only take one anti-inflammatory at a time.  Some common anti-inflammatories include: ibuprofen (ADVIL, MOTRIN), naproxen (ALEVE, NAPROSYN), celecoxib (CELEBREX), meloxicam (MOBIC), ketorolac (TORADOL).  Take this medication with food and water.     Medication Instructions - Opioids - Tapering Instructions   Order Comments: In the first three days following surgery, your symptoms may warrant use of the narcotic pain medication every four to six hours as prescribed. This is normal. As your pain symptoms improve, focus your efforts on decreasing (tapering) use of narcotic medications. The most successful tapering strategy is to first, decrease the number of tablets you take every 4-6 hours to the minimum prescribed. Then, increase the amount of time between doses.  For example:  First, taper to   or 1 tablet every 4-6 hours.  Then, taper to   or 1 tablet every 6-8 hours.  Then, taper to   or 1 tablet every 8-10 hours.  Then, taper to   or 1 tablet every 10-12 hours.  Then, taper to   or 1 tablet at bedtime.  The bedtime dose can help with comfort during sleep and is typically the last dose to be discontinued after surgery.     Follow Up Care   Order Comments: Follow-up with your Surgeon Team in 2 weeks for wound check.     Medication instructions -  Anticoagulation - aspirin   Order Comments: Take the aspirin as prescribed for a total of four weeks after surgery.  This is given to help minimize your risk of blood clot.     Medication Instructions - Opioid Instructions (Greater than or equal to 65 years)   Order Comments: You were discharged with an opioid  "medication (hydromorphone, oxycodone, hydrocodone, or tramadol). This medication should only be taken for breakthrough pain that is not controlled with acetaminophen (TYLENOL). If you rate your pain less than 3 you do not need this medication.  Pain rating 0-3:  You do not need this medication  Pain rating 4-6:  Take 1/2 tablet every 4-6 hours as needed  Pain rating 7-10:  Take 1 tablet every 4-6 hours as needed  Do not exceed 4 tablets per day     Activity - Total Hip Arthroplasty   Order Comments: Refer to the [facility] \"Your Guide to Total Joint Replacement\" for recommendations on activities and Exercises.     Order Specific Question Answer Comments   Is discharge order? Yes      Return to Driving   Order Comments: Return to driving - Driving is NOT permitted until directed by your provider. Under no circumstance are you permitted to drive while using narcotic pain medications.     Order Specific Question Answer Comments   Is discharge order? Yes      Dressing / Wound Care - Wound   Order Comments: You have a clean dressing on your surgical wound. Dressing change instructions as follows: dressing will be removed at your follow-up appointment. Contact your Surgeon Team if you have increased redness, warmth around the surgical wound, and/or drainage from the surgical wound.     Dressing / Wound Care - NO Tub Bathing   Order Comments: Tub bathing, swimming, or any other activities that will cause your incision to be submerged in water should be avoided until allowed by your Surgeon.     No flexion past 90 degrees   Order Comments: No bending at waist past 90 degrees.     Order Specific Question Answer Comments   Is discharge order? Yes      No internal rotation   Order Comments: No pivoting on your operative leg.     Order Specific Question Answer Comments   Is discharge order? Yes      No adduction past midline   Order Comments: No crossing your operative leg.     Order Specific Question Answer Comments "   Is discharge order? Yes      Weight bearing as tolerated   Order Comments: Weight bearing as tolerated on your operative extremity.     Order Specific Question Answer Comments   Is discharge order? Yes      Dressing Wound Care - Shower with wound/dressing NOT covered   Order Comments: You do not need to cover your dressing or incision in the shower, you may allow water and soap to run over top of the surgical dressing or incision. You may shower 2 days after surgery.  You are strictly prohibited from soaking or submerging the surgical wound underwater.     Crutches DME   Order Comments: DME Documentation: Describe the reason for need to support medical necessity: Impaired gait status post hip surgery. I, the undersigned, certify that the above prescribed supplies are medically necessary for this patient and is both reasonable and necessary in reference to accepted standards of medical practice in the treatment of this patient's condition and is not prescribed as a convenience.     Order Specific Question Answer Comments   DME Provider: [employer]    Crutch Type: Standard    Crutches Add On: NA    Length of Need: Lifetime      Cane DME   Order Comments: DME Documentation: Describe the reason for need to support medical necessity: Impaired gait status post hip surgery. I, the undersigned, certify that the above prescribed supplies are medically necessary for this patient and is both reasonable and necessary in reference to accepted standards of medical practice in the treatment of this patient's condition and is not prescribed as a convenience.     Order Specific Question Answer Comments   DME Provider: [employer]    Cane Type: Single Tip    Reminder: Patient can typically get 1 every 5 years      Walker DME   Order Comments: : DME Documentation: Describe the reason for need to support medical necessity: Impaired gait status post hip surgery. I, the undersigned, certify that the above prescribed supplies are  medically necessary for this patient and is both reasonable and necessary in reference to accepted standards of medical practice in the treatment of this patient's condition and is not prescribed as a convenience.     Order Specific Question Answer Comments   DME Provider: [employer]    Walker Type: Standard (2 Wheel)    Accessories: N/A      Discharge Instruction - Regular Diet Adult   Order Comments: Return to your pre-surgery diet unless instructed otherwise     Order Specific Question Answer Comments   Is discharge order? Yes        Tom Head MD  Orthopaedic Surgery PGY-4  [phone]

## 2023-06-07 ENCOUNTER — TELEPHONE (OUTPATIENT)
Dept: RHEUMATOLOGY | Facility: CLINIC | Age: 83
End: 2023-06-07
Payer: MEDICARE

## 2023-06-07 NOTE — TELEPHONE ENCOUNTER
"Patient is not sure if she is having issues with gout or what is going on. She did not want to leave any information just said \"I need to talk with Dr. Lopez or her nurse.\"  "

## 2023-06-07 NOTE — TELEPHONE ENCOUNTER
Returned call to patient.  Patient reviewed that she just had a R TKA a week ago.  Patient confirmed the burning/numbness she has in her  Bilateral feet was present prior to hip surgery.  Patient is noting some swelling in her right foot/surgical side. Reviewed s/sx of a DVT, patient denied symptoms.Noted that some swelling is to be expected in the surgical extremity.    Patient has restarted allopurinol. Patient will continue to monitor for s/sx of gout,    Encouraged patient to reach out to Dr. Shannon's team.  Patient had many questions about pain management and her medications.    Patient verbalized understanding. Patient will reach back out to Dr. Lopez's team if she is worried about about. Patient has anakinra in the home.    BARBI Conley, RN  RN Care Coordinator Rheumatology

## 2023-06-07 NOTE — TELEPHONE ENCOUNTER
COMPOUNDED NON-CONTROLLED SUBSTANCE (CMPD RX) - PHARMACY TO MIX COMPOUNDED MEDICATION      Last Written Prescription Date:  8/17/21  Last Fill Quantity: 30g,   # refills: 11  Last Office Visit : 7/14/21  Future Office visit:  none    Routing refill request to provider for review/approval because:    Overdue for office visit  Last ordered 8/2021 - gap in refills

## 2023-06-08 DIAGNOSIS — N95.2 ATROPHIC VAGINITIS: ICD-10-CM

## 2023-06-16 ENCOUNTER — OFFICE VISIT (OUTPATIENT)
Dept: ORTHOPEDICS | Facility: CLINIC | Age: 83
End: 2023-06-16
Payer: MEDICARE

## 2023-06-16 DIAGNOSIS — Z96.641 STATUS POST TOTAL REPLACEMENT OF RIGHT HIP: Primary | ICD-10-CM

## 2023-06-16 PROCEDURE — 99024 POSTOP FOLLOW-UP VISIT: CPT | Performed by: PHYSICIAN ASSISTANT

## 2023-06-16 NOTE — NURSING NOTE
Reason For Visit:   Chief Complaint   Patient presents with     Surgical Followup     2 week pop DOS: 5/31/23 Right SRI  with Dr. Shannon       There were no vitals taken for this visit.    Pain Assessment  Patient Currently in Pain: Yes  0-10 Pain Scale: 2  Primary Pain Location: Hip (Right)    Sabrina Delaney, VARUN\

## 2023-06-16 NOTE — PROGRESS NOTES
ASSESSMENT/PLAN:  Betty Tee is a 83 year old who is status post RTHA with Dr. Shannon on 5/31/23.      Basic wound cares were performed at today's visit. Incision appears to be healing very well. No signs of infection. No visible rash present on exam today.  We spent time discussing future wound/incision cares. We reviewed recommendations for bathing and hygiene. She will let steri strips fall off on their own in the shower and if still in place, remove in another 2 weeks. We reviewed ongoing risk of hip dislocation and importance of continuing hip restrictions of no flexion >90 degrees, no adduction past midline and no internal rotation.       The patient will see Dr. Shannon at six to eight weeks post op. Betty has our clinic number and will call with any questions or concerns.    Destinee Panchal PA-C  Orthopaedic Surgery    _________________________________    HISTORY OF PRESENT ILLNESS:  Betty Tee is a 83 year old female who is approximately 2 weeks status post the above procedure.    Weight bearing status/devices: Wheelchair for long distances, walker  Pain level and management: pain is 2/10, currently using tizanidine, lidocaine patch and dilaudid, beginning to stretch to 6 hours in between. Initially had burning pain down leg worsened postop, now much better. She states pain she dealt with prior to surgery with groin is much improved already.   Physical therapy & ROM: at TCU, walking up to 150 feet.      The patient endorses swelling around the surgical incision but denies surrounding redness. The incision has been dry, without discharge or drainage. Betty denies recent fevers and chills but does have sensations of hot and cold at times.  She is a sister of [employer]. She is here with another sister, Nghia.     DVT prophylaxis: 162mg ASA daily x 4 weeks.  Patient denies calf pain or tenderness.      MEDICATIONS:   Current Outpatient Rx   Medication Sig Dispense Refill     ACCU-CHEK SHANNON  PLUS test strip USE TO TEST BLOOD SUGARS 3 TIMES DAILY 300 strip 5     acetaminophen (TYLENOL) 500 MG tablet Take 2 tablets (1,000 mg) by mouth every 8 hours as needed (max 6 tablets/24 hours, 2 tablets/dose) 60 tablet 0     acyclovir (ZOVIRAX) 400 MG tablet Take 1 tablet (400 mg) by mouth every 8 hours For a couple days 15 tablet 2     acyclovir (ZOVIRAX) 5 % external ointment Apply topically as needed (6 times day PRN for outbreaks) As needed for outbreaks 15 g 3     albuterol (PROAIR HFA/PROVENTIL HFA/VENTOLIN HFA) 108 (90 Base) MCG/ACT inhaler Inhale 2 puffs into the lungs every 6 hours as needed for wheezing or shortness of breath       alendronate (FOSAMAX) 70 MG tablet Take 1 tablet (70 mg) by mouth every 7 days 12 tablet 1     allopurinol (ZYLOPRIM) 100 MG tablet Take 1 tablet (100 mg) by mouth daily 90 tablet 1     anakinra (KINERET) 100 MG/0.67ML SOSY injection 100 mg every day x 3 days as needed for flare ups 6.7 mL 3     aspirin (ASA) 81 MG EC tablet Take 1 tablet (81 mg) by mouth 2 times daily 60 tablet 0     atorvastatin (LIPITOR) 10 MG tablet TAKE 1/2 TABLET BY MOUTH EVERY DAY 45 tablet 3     azithromycin (ZITHROMAX) 250 MG tablet TAKE 1 TABLET BY MOUTH EVERY DAY 30 tablet 5     BD PEN NEEDLE JANE 2ND GEN 32G X 4 MM miscellaneous USE TO TEST 4 TIMES A  each 1     blood glucose (NO BRAND SPECIFIED) lancets standard Use to test blood sugar 3 times daily or as directed 100 lancet 3     BREO ELLIPTA 100-25 MCG/ACT inhaler TAKE 1 PUFF BY MOUTH EVERY DAY 1 each 11     cevimeline (EVOXAC) 30 MG capsule Take 1 capsule (30 mg) by mouth 3 times daily as needed       Cholecalciferol (VITAMIN D3) 50 MCG (2000 UT) CAPS TAKE 100 MCG BY MOUTH DAILY (TAKE 2 TABLET (50 MCG) BY MOUTH DAILY - ORAL) 180 capsule 0     COMPOUNDED NON-CONTROLLED SUBSTANCE (CMPD RX) - PHARMACY TO MIX COMPOUNDED MEDICATION Estriol 1 mg/g Apply small amount to finger and apply to inside vagina daily for 2 weeks then twice weekly Route:  vaginally Dispense 30 grams 11 refills 30 g 11     cyanocobalamin (VITAMIN B-12) 1000 MCG tablet Take 1 tablet (1,000 mcg) by mouth three times a week       escitalopram (LEXAPRO) 20 MG tablet TAKE 1 TABLET BY MOUTH EVERY DAY 90 tablet 1     ferrous gluconate (FERGON) 324 (38 Fe) MG tablet Take 1 tablet (324 mg) by mouth three times a week       fluticasone (FLONASE) 50 MCG/ACT nasal spray SPRAY 1-2 SPRAYS INTO BOTH NOSTRILS DAILY AS NEEDED FOR ALLERGIES 16 mL 11     HYDROmorphone (DILAUDID) 2 MG tablet Take 1 tablet (2 mg) by mouth every 4 hours as needed for moderate pain 26 tablet 0     hydroxychloroquine (PLAQUENIL) 200 MG tablet Take 2 tablets (400 mg) by mouth daily Get annual eye exams for hydroxychloroquine (Plaquenil) monitoring and fax to [phone] 180 tablet 3     hydrOXYzine (ATARAX) 25 MG tablet Take 1 tablet (25 mg) by mouth every 6 hours as needed for other (adjuvant pain) 40 tablet 0     insulin lispro (HUMALOG KWIKPEN) 100 UNIT/ML (1 unit dial) KWIKPEN Inject Subcutaneous 3 times daily (before meals) Sliding scale insulin; tests BG three times day  If BG <150, no insulin given   If -200 take 2 units  If -250 take 4 units  If -300 take 6 units  If -350 take 10 units  If BG >350 take 14 units       ketoconazole (NIZORAL) 2 % external cream Apply topically 2 times daily as needed for itching 60 g 1     KLOR-CON 20 MEQ CR tablet TAKE 2 TABLETS (40 MEQ) BY MOUTH EVERY MORNING AND 1 TABLET (20 MEQ) EVERY EVENING. 270 tablet 3     lidocaine (LIDODERM) 5 % patch APPLY PATCH TO PAINFUL AREA FOR UP TO 12 H WITHIN A 24 H PERIOD. REMOVE AFTER 12 HOURS. (Patient taking differently: Apply patch to painful area for up to 12 h within a 24 h period.  Remove after 12 hours.) 30 patch 2     loratadine (CLARITIN) 10 MG tablet TAKE 1 TABLET BY MOUTH EVERY DAY 90 tablet 3     methocarbamol (ROBAXIN) 750 MG tablet Take 1 tablet (750 mg) by mouth 3 times daily as needed for muscle spasms 90  tablet 3     metoprolol succinate ER (TOPROL XL) 50 MG 24 hr tablet Take 1 tablet (50 mg) by mouth 2 times daily 180 tablet 3     montelukast (SINGULAIR) 10 MG tablet TAKE 1 TABLET BY MOUTH EVERYDAY AT BEDTIME 90 tablet 1     OZEMPIC, 1 MG/DOSE, 4 MG/3ML pen INJECT 1 MG SUBCUTANEOUS ONCE A WEEK 9 mL 1     pantoprazole (PROTONIX) 40 MG EC tablet Take 1 tablet (40 mg) by mouth daily (replaces famotidine- should stop taking famotidine) 90 tablet 3     polyethylene glycol (MIRALAX) 17 g packet Take 17 g by mouth daily 10 packet 0     predniSONE (DELTASONE) 5 MG tablet Take 1 tablet (5 mg) by mouth daily 90 tablet 1     senna-docusate (SENOKOT-S/PERICOLACE) 8.6-50 MG tablet Take 1 tablet by mouth 2 times daily 30 tablet 0     tiZANidine (ZANAFLEX) 2 MG tablet Take 1 tablet (2 mg) by mouth every 6 hours as needed for muscle spasms 30 tablet 0     torsemide (DEMADEX) 20 MG tablet Take 2 tablets (40 mg) by mouth every morning AND 1 tablet (20 mg) every evening. 270 tablet 3         ALLERGIES: Amoxicillin-pot clavulanate, Amoxicillin-pot clavulanate, Codeine, Penicillins, Phenobarbital, and Seasonal allergies       PHYSICAL EXAMINATION:   On physical examination the patient is comfortable and is in no acute distress. The affect is appropriate and breathing is non-labored.    Surgical wound: The surgical wound was exposed and found to be clean and dry, without drainage or discharge. There is mild edema around the incision. There is no erythema. The skin was appropriately warm to touch.     ROM: Hip ROM supple without pain.   Isometric activation of the quadriceps: in tact  SLR: in tact without lag  Gait: ambulating with wheelchair. Able to stand with walker without assist.   No LE edma. Calf soft and nontender. Thigh compartments soft.     Motor intact distally throughout the tibial and peroneal nerve distributions, 5/5 strength with tibialis anterior, gastrocnemius and soleus, EHL, FHL firing  Sensation intact to light touch  throughout superficial peroneal, deep peroneal, tibial, saphenous, and sural nerves  Dorsalis pedis and posterior tibial pulses palpable, toes warm and well-perfused

## 2023-06-16 NOTE — LETTER
6/16/2023         RE: Betty Tee  [address]  [facility] [address]        Dear Colleague,    Thank you for referring your patient, Betty Tee, to the [facility] [place]. Please see a copy of my visit note below.    ASSESSMENT/PLAN:  Betty Tee is a 83 year old who is status post RTHA with Dr. Shannon on 5/31/23.      Basic wound cares were performed at today's visit. Incision appears to be healing very well. No signs of infection. No visible rash present on exam today.  We spent time discussing future wound/incision cares. We reviewed recommendations for bathing and hygiene. She will let steri strips fall off on their own in the shower and if still in place, remove in another 2 weeks. We reviewed ongoing risk of hip dislocation and importance of continuing hip restrictions of no flexion >90 degrees, no adduction past midline and no internal rotation.       The patient will see Dr. Shannon at six to eight weeks post op. Betty has our clinic number and will call with any questions or concerns.    Destinee Panchal PA-C  Orthopaedic Surgery    _________________________________    HISTORY OF PRESENT ILLNESS:  Betty Tee is a 83 year old female who is approximately 2 weeks status post the above procedure.    Weight bearing status/devices: Wheelchair for long distances, walker  Pain level and management: pain is 2/10, currently using tizanidine, lidocaine patch and dilaudid, beginning to stretch to 6 hours in between. Initially had burning pain down leg worsened postop, now much better. She states pain she dealt with prior to surgery with groin is much improved already.   Physical therapy & ROM: at TCU, walking up to 150 feet.      The patient endorses swelling around the surgical incision but denies surrounding redness. The incision has been dry, without discharge or drainage. Betty denies recent fevers and chills but does have sensations of hot and  cold at times.  She is a sister of [employer]. She is here with another sister, Nghia.     DVT prophylaxis: 162mg ASA daily x 4 weeks.  Patient denies calf pain or tenderness.      MEDICATIONS:   Current Outpatient Rx   Medication Sig Dispense Refill    ACCU-CHEK SHANNON PLUS test strip USE TO TEST BLOOD SUGARS 3 TIMES DAILY 300 strip 5    acetaminophen (TYLENOL) 500 MG tablet Take 2 tablets (1,000 mg) by mouth every 8 hours as needed (max 6 tablets/24 hours, 2 tablets/dose) 60 tablet 0    acyclovir (ZOVIRAX) 400 MG tablet Take 1 tablet (400 mg) by mouth every 8 hours For a couple days 15 tablet 2    acyclovir (ZOVIRAX) 5 % external ointment Apply topically as needed (6 times day PRN for outbreaks) As needed for outbreaks 15 g 3    albuterol (PROAIR HFA/PROVENTIL HFA/VENTOLIN HFA) 108 (90 Base) MCG/ACT inhaler Inhale 2 puffs into the lungs every 6 hours as needed for wheezing or shortness of breath      alendronate (FOSAMAX) 70 MG tablet Take 1 tablet (70 mg) by mouth every 7 days 12 tablet 1    allopurinol (ZYLOPRIM) 100 MG tablet Take 1 tablet (100 mg) by mouth daily 90 tablet 1    anakinra (KINERET) 100 MG/0.67ML SOSY injection 100 mg every day x 3 days as needed for flare ups 6.7 mL 3    aspirin (ASA) 81 MG EC tablet Take 1 tablet (81 mg) by mouth 2 times daily 60 tablet 0    atorvastatin (LIPITOR) 10 MG tablet TAKE 1/2 TABLET BY MOUTH EVERY DAY 45 tablet 3    azithromycin (ZITHROMAX) 250 MG tablet TAKE 1 TABLET BY MOUTH EVERY DAY 30 tablet 5    BD PEN NEEDLE JANE 2ND GEN 32G X 4 MM miscellaneous USE TO TEST 4 TIMES A  each 1    blood glucose (NO BRAND SPECIFIED) lancets standard Use to test blood sugar 3 times daily or as directed 100 lancet 3    BREO ELLIPTA 100-25 MCG/ACT inhaler TAKE 1 PUFF BY MOUTH EVERY DAY 1 each 11    cevimeline (EVOXAC) 30 MG capsule Take 1 capsule (30 mg) by mouth 3 times daily as needed      Cholecalciferol (VITAMIN D3) 50 MCG (2000 UT) CAPS TAKE 100 MCG BY MOUTH DAILY (TAKE 2  TABLET (50 MCG) BY MOUTH DAILY - ORAL) 180 capsule 0    COMPOUNDED NON-CONTROLLED SUBSTANCE (CMPD RX) - PHARMACY TO MIX COMPOUNDED MEDICATION Estriol 1 mg/g Apply small amount to finger and apply to inside vagina daily for 2 weeks then twice weekly Route: vaginally Dispense 30 grams 11 refills 30 g 11    cyanocobalamin (VITAMIN B-12) 1000 MCG tablet Take 1 tablet (1,000 mcg) by mouth three times a week      escitalopram (LEXAPRO) 20 MG tablet TAKE 1 TABLET BY MOUTH EVERY DAY 90 tablet 1    ferrous gluconate (FERGON) 324 (38 Fe) MG tablet Take 1 tablet (324 mg) by mouth three times a week      fluticasone (FLONASE) 50 MCG/ACT nasal spray SPRAY 1-2 SPRAYS INTO BOTH NOSTRILS DAILY AS NEEDED FOR ALLERGIES 16 mL 11    HYDROmorphone (DILAUDID) 2 MG tablet Take 1 tablet (2 mg) by mouth every 4 hours as needed for moderate pain 26 tablet 0    hydroxychloroquine (PLAQUENIL) 200 MG tablet Take 2 tablets (400 mg) by mouth daily Get annual eye exams for hydroxychloroquine (Plaquenil) monitoring and fax to [phone] 180 tablet 3    hydrOXYzine (ATARAX) 25 MG tablet Take 1 tablet (25 mg) by mouth every 6 hours as needed for other (adjuvant pain) 40 tablet 0    insulin lispro (HUMALOG KWIKPEN) 100 UNIT/ML (1 unit dial) KWIKPEN Inject Subcutaneous 3 times daily (before meals) Sliding scale insulin; tests BG three times day  If BG <150, no insulin given   If -200 take 2 units  If -250 take 4 units  If -300 take 6 units  If -350 take 10 units  If BG >350 take 14 units      ketoconazole (NIZORAL) 2 % external cream Apply topically 2 times daily as needed for itching 60 g 1    KLOR-CON 20 MEQ CR tablet TAKE 2 TABLETS (40 MEQ) BY MOUTH EVERY MORNING AND 1 TABLET (20 MEQ) EVERY EVENING. 270 tablet 3    lidocaine (LIDODERM) 5 % patch APPLY PATCH TO PAINFUL AREA FOR UP TO 12 H WITHIN A 24 H PERIOD. REMOVE AFTER 12 HOURS. (Patient taking differently: Apply patch to painful area for up to 12 h within a 24 h period.   Remove after 12 hours.) 30 patch 2    loratadine (CLARITIN) 10 MG tablet TAKE 1 TABLET BY MOUTH EVERY DAY 90 tablet 3    methocarbamol (ROBAXIN) 750 MG tablet Take 1 tablet (750 mg) by mouth 3 times daily as needed for muscle spasms 90 tablet 3    metoprolol succinate ER (TOPROL XL) 50 MG 24 hr tablet Take 1 tablet (50 mg) by mouth 2 times daily 180 tablet 3    montelukast (SINGULAIR) 10 MG tablet TAKE 1 TABLET BY MOUTH EVERYDAY AT BEDTIME 90 tablet 1    OZEMPIC, 1 MG/DOSE, 4 MG/3ML pen INJECT 1 MG SUBCUTANEOUS ONCE A WEEK 9 mL 1    pantoprazole (PROTONIX) 40 MG EC tablet Take 1 tablet (40 mg) by mouth daily (replaces famotidine- should stop taking famotidine) 90 tablet 3    polyethylene glycol (MIRALAX) 17 g packet Take 17 g by mouth daily 10 packet 0    predniSONE (DELTASONE) 5 MG tablet Take 1 tablet (5 mg) by mouth daily 90 tablet 1    senna-docusate (SENOKOT-S/PERICOLACE) 8.6-50 MG tablet Take 1 tablet by mouth 2 times daily 30 tablet 0    tiZANidine (ZANAFLEX) 2 MG tablet Take 1 tablet (2 mg) by mouth every 6 hours as needed for muscle spasms 30 tablet 0    torsemide (DEMADEX) 20 MG tablet Take 2 tablets (40 mg) by mouth every morning AND 1 tablet (20 mg) every evening. 270 tablet 3         ALLERGIES: Amoxicillin-pot clavulanate, Amoxicillin-pot clavulanate, Codeine, Penicillins, Phenobarbital, and Seasonal allergies       PHYSICAL EXAMINATION:   On physical examination the patient is comfortable and is in no acute distress. The affect is appropriate and breathing is non-labored.    Surgical wound: The surgical wound was exposed and found to be clean and dry, without drainage or discharge. There is mild edema around the incision. There is no erythema. The skin was appropriately warm to touch.     ROM: Hip ROM supple without pain.   Isometric activation of the quadriceps: in tact  SLR: in tact without lag  Gait: ambulating with wheelchair. Able to stand with walker without assist.   No LE edma. Calf soft and  nontender. Thigh compartments soft.     Motor intact distally throughout the tibial and peroneal nerve distributions, 5/5 strength with tibialis anterior, gastrocnemius and soleus, EHL, FHL firing  Sensation intact to light touch throughout superficial peroneal, deep peroneal, tibial, saphenous, and sural nerves  Dorsalis pedis and posterior tibial pulses palpable, toes warm and well-perfused

## 2023-07-03 ENCOUNTER — MEDICAL CORRESPONDENCE (OUTPATIENT)
Dept: HEALTH INFORMATION MANAGEMENT | Facility: CLINIC | Age: 83
End: 2023-07-03
Payer: MEDICARE

## 2023-07-03 ENCOUNTER — TELEPHONE (OUTPATIENT)
Dept: FAMILY MEDICINE | Facility: CLINIC | Age: 83
End: 2023-07-03
Payer: MEDICARE

## 2023-07-03 NOTE — TELEPHONE ENCOUNTER
Patients caregiver/ friend Nghia salguero wanting to give MD update on status of pt.    On May 31st Betty had hip replacement   hospitalized for 5 nights and was in Inpatient Rehab until 7/1/23    She is now home and has HHA aid, PT/ OT coming to her home    Pt had a change in meds and is now taking   Zanaflex 2mg 4x per day ( has until 7/7)   and   Dilaudid 2mg 4 x per day ( has until 7/10)     scheduled as follows   8 am / 2 pm/  8 pm/  2 am     Taper off with them as pain and muscle spasums resolve.    Pt is currently not taking oxycodone and methylcarbinol.       Feels like she is doing well. walking now with minimal assistance with walker.      Has follow up with PCP 8/1/23 apt  PO surgical apt. 7/20/23      Would like to have refills on    Zanaflex 2mg 4x per day ( has until 7/7)   and   Dilaudid 2mg 4 x per day ( has until 7/10)     Please sent to [facility] pharmacy:   [id number] - [place], [place] - [address]    Thank you ,   [employer] RN  [facility]

## 2023-07-03 NOTE — TELEPHONE ENCOUNTER
Order/Referral Request    Who is requesting: Gavin with [facility]   Orders being requested: PT therapeutic exercise , and therapeutic activity, gate, and balance training.   2 times a week for 2 weeks  1 time a week for 1 week  OT 1 time for 1 week   Home health aid 1 time for 1 week for bathing    Reason service is needed/diagnosis: hip replacement    When are orders needed by: asap    Does patient have a preference on a Group/Provider/Facility? [facility]     Please call Gavin with Verbal orders  Gavin [phone]     Gavin has concerns about the medications and interactions of the followin. Hydroxy chloroquine this interacts with lexapro  2. Azithromycin it interacts with hydroxy chloroquine  3. Azithromycin interacts with lexapro  4. Hydroxy chloroquine interacts with hydroxyzine  5. Hydroxyzine interacts with klor-con  6. Hydroxyzine interacts with lexapro  7. Azithromycin interacts with Hydroxyzine

## 2023-07-05 ENCOUNTER — TELEPHONE (OUTPATIENT)
Dept: RHEUMATOLOGY | Facility: CLINIC | Age: 83
End: 2023-07-05

## 2023-07-05 NOTE — TELEPHONE ENCOUNTER
CW,      Home Care is calling regarding an established patient with [facility].       Requesting orders from: Ilene Tristan  Provider is following patient: Yes  Is this a 60-day recertification request?  No    Home care initiation:    Orders Requested  Orders being requested: PT therapeutic exercise , and therapeutic activity, gate, and balance training.   2 times a week for 2 weeks  1 time a week for 1 week  OT 1 time for 1 week   Home health aid 1 time for 1 week for bathing      Confirmed ok to leave a detailed message with call back.  Contact information confirmed and updated as needed.    Juana Hemphill RN

## 2023-07-05 NOTE — TELEPHONE ENCOUNTER
Health Call Center    Phone Message    May a detailed message be left on voicemail: yes     Reason for Call: Symptoms or Concerns     If patient has red-flag symptoms, warm transfer to triage line    Current symptom or concern: woke up this morning with her Right hand swollen; in a lot of pain especially in her thumb.    Pt is wondering if this is due to he gout?    Please contact the Pt's friend, Nghia    Symptoms have been present for:  1 day(s); over night    Has patient previously been seen for this? No, but seens Dr. Lopez for [facility]     By : John      Are there any new or worsening symptoms? Yes: new      Action Taken: Message routed to:  [facility] ([facility]): Adult Rheumatology Triage

## 2023-07-05 NOTE — TELEPHONE ENCOUNTER
Returned call to caregiver Carolyn Hernández woke up today with the swelling in her right thumb/hand.   Patient has remained on allopurinol daily along with 5 mg prednisone daily.  Patient took anakinra and will take for 2 more days.    Will update this RN if symptoms do not improve.    All questions answered.    Stefania Graves BSN, RN  RN Care Coordinator Rheumatology

## 2023-07-05 NOTE — TELEPHONE ENCOUNTER
Forms/Letter Request    Type of form/letter: home health cert and plan of care 7/3/2023-8/31/2023 pleaced in [facility] in box discharge from 7/1/2023 for pt//ot and hha    Have you been seen for this request: Yes     Do we have the form/letter: Yes:     Who is the form from? Home care    Where did/will the form come from? form was faxed in    When is form/letter needed by: asap    How would you like the form/letter returned: Fax : [phone]    Patient Notified form requests are processed in 3-5 business days:Yes    Could we send this information to you in [employer] or would you prefer to receive a phone call?:   No preference   Okay to leave a detailed message?: Yes at Other phone number:  [phone]

## 2023-07-07 ENCOUNTER — MEDICAL CORRESPONDENCE (OUTPATIENT)
Dept: HEALTH INFORMATION MANAGEMENT | Facility: CLINIC | Age: 83
End: 2023-07-07
Payer: MEDICARE

## 2023-07-07 DIAGNOSIS — Z53.9 DIAGNOSIS NOT YET DEFINED: Primary | ICD-10-CM

## 2023-07-07 PROCEDURE — G0180 MD CERTIFICATION HHA PATIENT: HCPCS | Performed by: FAMILY MEDICINE

## 2023-07-12 DIAGNOSIS — M10.9 ACUTE GOUT OF FOOT, UNSPECIFIED CAUSE, UNSPECIFIED LATERALITY: ICD-10-CM

## 2023-07-14 ENCOUNTER — TELEPHONE (OUTPATIENT)
Dept: FAMILY MEDICINE | Facility: CLINIC | Age: 83
End: 2023-07-14
Payer: MEDICARE

## 2023-07-14 NOTE — TELEPHONE ENCOUNTER
Reason for Call:  Home Health Care    Maribell with [facility] called regarding (reason for call): verbal orders    Orders are needed for this patient. Yes OT    PT: N/A    OT: 1 Time a week for 2 weeks    Skilled Nursing: N/A    Pt Provider: Kun    Phone Number Homecare Nurse can be reached at: [phone]    Can we leave a detailed message on this number? YES    Phone number patient can be reached at: Other phone number:  N/A*    Best Time: Today    Call taken on 7/14/2023 at 4:21 PM by Trixie Ratliff

## 2023-07-16 ENCOUNTER — APPOINTMENT (OUTPATIENT)
Dept: GENERAL RADIOLOGY | Facility: CLINIC | Age: 83
End: 2023-07-16
Attending: STUDENT IN AN ORGANIZED HEALTH CARE EDUCATION/TRAINING PROGRAM
Payer: MEDICARE

## 2023-07-16 ENCOUNTER — HOSPITAL ENCOUNTER (EMERGENCY)
Facility: CLINIC | Age: 83
Discharge: HOME | End: 2023-07-16
Attending: STUDENT IN AN ORGANIZED HEALTH CARE EDUCATION/TRAINING PROGRAM | Admitting: STUDENT IN AN ORGANIZED HEALTH CARE EDUCATION/TRAINING PROGRAM
Payer: MEDICARE

## 2023-07-16 VITALS
WEIGHT: 177 LBS | OXYGEN SATURATION: 96 % | HEART RATE: 53 BPM | DIASTOLIC BLOOD PRESSURE: 64 MMHG | RESPIRATION RATE: 16 BRPM | SYSTOLIC BLOOD PRESSURE: 141 MMHG | BODY MASS INDEX: 28.45 KG/M2 | HEIGHT: 66 IN | TEMPERATURE: 98.2 F

## 2023-07-16 DIAGNOSIS — M1A.09X1 IDIOPATHIC CHRONIC GOUT OF MULTIPLE SITES WITH TOPHUS: ICD-10-CM

## 2023-07-16 LAB
ANION GAP SERPL CALCULATED.3IONS-SCNC: 12 MMOL/L (ref 7–15)
BASOPHILS # BLD AUTO: 0 10E3/UL (ref 0–0.2)
BASOPHILS NFR BLD AUTO: 0 %
BUN SERPL-MCNC: 13.5 MG/DL (ref 8–23)
CALCIUM SERPL-MCNC: 9.2 MG/DL (ref 8.8–10.2)
CHLORIDE SERPL-SCNC: 99 MMOL/L (ref 98–107)
CREAT SERPL-MCNC: 0.86 MG/DL (ref 0.51–0.95)
CRP SERPL-MCNC: 68.4 MG/L
DEPRECATED HCO3 PLAS-SCNC: 25 MMOL/L (ref 22–29)
EOSINOPHIL # BLD AUTO: 0.2 10E3/UL (ref 0–0.7)
EOSINOPHIL NFR BLD AUTO: 2 %
ERYTHROCYTE [DISTWIDTH] IN BLOOD BY AUTOMATED COUNT: 14.1 % (ref 10–15)
ERYTHROCYTE [SEDIMENTATION RATE] IN BLOOD BY WESTERGREN METHOD: 27 MM/HR (ref 0–30)
GFR SERPL CREATININE-BSD FRML MDRD: 67 ML/MIN/1.73M2
GLUCOSE SERPL-MCNC: 262 MG/DL (ref 70–99)
HCT VFR BLD AUTO: 36.9 % (ref 35–47)
HGB BLD-MCNC: 11.2 G/DL (ref 11.7–15.7)
HOLD SPECIMEN: NORMAL
IMM GRANULOCYTES # BLD: 0 10E3/UL
IMM GRANULOCYTES NFR BLD: 0 %
LYMPHOCYTES # BLD AUTO: 0.7 10E3/UL (ref 0.8–5.3)
LYMPHOCYTES NFR BLD AUTO: 7 %
MCH RBC QN AUTO: 28.5 PG (ref 26.5–33)
MCHC RBC AUTO-ENTMCNC: 30.4 G/DL (ref 31.5–36.5)
MCV RBC AUTO: 94 FL (ref 78–100)
MONOCYTES # BLD AUTO: 1 10E3/UL (ref 0–1.3)
MONOCYTES NFR BLD AUTO: 11 %
NEUTROPHILS # BLD AUTO: 7.4 10E3/UL (ref 1.6–8.3)
NEUTROPHILS NFR BLD AUTO: 80 %
NRBC # BLD AUTO: 0 10E3/UL
NRBC BLD AUTO-RTO: 0 /100
PLATELET # BLD AUTO: 211 10E3/UL (ref 150–450)
POTASSIUM SERPL-SCNC: 4.2 MMOL/L (ref 3.4–5.3)
RBC # BLD AUTO: 3.93 10E6/UL (ref 3.8–5.2)
SODIUM SERPL-SCNC: 136 MMOL/L (ref 136–145)
URATE SERPL-MCNC: 4.7 MG/DL (ref 2.4–5.7)
WBC # BLD AUTO: 9.3 10E3/UL (ref 4–11)

## 2023-07-16 PROCEDURE — 85014 HEMATOCRIT: CPT | Performed by: STUDENT IN AN ORGANIZED HEALTH CARE EDUCATION/TRAINING PROGRAM

## 2023-07-16 PROCEDURE — 73660 X-RAY EXAM OF TOE(S): CPT | Mod: 26 | Performed by: RADIOLOGY

## 2023-07-16 PROCEDURE — 73560 X-RAY EXAM OF KNEE 1 OR 2: CPT | Mod: LT

## 2023-07-16 PROCEDURE — 99284 EMERGENCY DEPT VISIT MOD MDM: CPT | Performed by: STUDENT IN AN ORGANIZED HEALTH CARE EDUCATION/TRAINING PROGRAM

## 2023-07-16 PROCEDURE — 86140 C-REACTIVE PROTEIN: CPT | Performed by: STUDENT IN AN ORGANIZED HEALTH CARE EDUCATION/TRAINING PROGRAM

## 2023-07-16 PROCEDURE — 85652 RBC SED RATE AUTOMATED: CPT | Performed by: STUDENT IN AN ORGANIZED HEALTH CARE EDUCATION/TRAINING PROGRAM

## 2023-07-16 PROCEDURE — 84550 ASSAY OF BLOOD/URIC ACID: CPT | Performed by: STUDENT IN AN ORGANIZED HEALTH CARE EDUCATION/TRAINING PROGRAM

## 2023-07-16 PROCEDURE — 73660 X-RAY EXAM OF TOE(S): CPT | Mod: RT

## 2023-07-16 PROCEDURE — 73560 X-RAY EXAM OF KNEE 1 OR 2: CPT | Mod: 26 | Performed by: RADIOLOGY

## 2023-07-16 PROCEDURE — 36415 COLL VENOUS BLD VENIPUNCTURE: CPT | Performed by: STUDENT IN AN ORGANIZED HEALTH CARE EDUCATION/TRAINING PROGRAM

## 2023-07-16 PROCEDURE — 73130 X-RAY EXAM OF HAND: CPT | Mod: 26 | Performed by: RADIOLOGY

## 2023-07-16 PROCEDURE — 73130 X-RAY EXAM OF HAND: CPT | Mod: RT

## 2023-07-16 PROCEDURE — 80048 BASIC METABOLIC PNL TOTAL CA: CPT | Performed by: STUDENT IN AN ORGANIZED HEALTH CARE EDUCATION/TRAINING PROGRAM

## 2023-07-16 PROCEDURE — 99284 EMERGENCY DEPT VISIT MOD MDM: CPT

## 2023-07-16 RX ORDER — PREDNISONE 20 MG/1
TABLET ORAL
Qty: 20 TABLET | Refills: 0 | Status: SHIPPED | OUTPATIENT
Start: 2023-07-16 | End: 2023-07-16

## 2023-07-16 RX ORDER — ACETAMINOPHEN 325 MG/1
975 TABLET ORAL ONCE
Status: COMPLETED | OUTPATIENT
Start: 2023-07-16 | End: 2023-07-16

## 2023-07-16 RX ORDER — PREDNISONE 20 MG/1
40 TABLET ORAL ONCE
Status: DISCONTINUED | OUTPATIENT
Start: 2023-07-16 | End: 2023-07-16 | Stop reason: HOSPADM

## 2023-07-16 RX ORDER — ALLOPURINOL 100 MG/1
100 TABLET ORAL DAILY
Qty: 90 TABLET | Refills: 0 | Status: SHIPPED | OUTPATIENT
Start: 2023-07-16 | End: 2023-10-17

## 2023-07-16 RX ORDER — PREDNISONE 20 MG/1
TABLET ORAL
Qty: 11 TABLET | Refills: 0 | Status: SHIPPED | OUTPATIENT
Start: 2023-07-16 | End: 2023-08-01 | Stop reason: ALTCHOICE

## 2023-07-16 ASSESSMENT — ACTIVITIES OF DAILY LIVING (ADL): ADLS_ACUITY_SCORE: 37

## 2023-07-16 NOTE — ED TRIAGE NOTES
Pt presents with a 1 day history of increasing right hand and left knee pain that she attributes to gout.  She has a history of gout and did administer her Anakinra inj today and that brought her pain down to a 6.  Rheum advised ED to check labs and rule out infection.       Triage Assessment     Row Name 07/16/23 0345       Triage Assessment (Adult)    Airway WDL WDL       Respiratory WDL    Respiratory WDL WDL       Skin Circulation/Temperature WDL    Skin Circulation/Temperature WDL WDL       Cardiac WDL    Cardiac WDL WDL       Peripheral/Neurovascular WDL    Peripheral Neurovascular WDL WDL       Cognitive/Neuro/Behavioral WDL    Cognitive/Neuro/Behavioral WDL WDL

## 2023-07-16 NOTE — TELEPHONE ENCOUNTER
"  allopurinol (ZYLOPRIM) 100 MG tablet   Last Written Prescription Date:  1/13/23  Last Fill Quantity: 90,   # refills: 1  Last Office Visit : 1/13/23  Future Office visit: 7/21/23    Uric acid High  1/13/23  Uric Acid 2.4 - 5.7 mg/dL 6.2 High    Written by Zulma Lopez MD on 1/15/2023 12:24 PM CST      Routing refill request to provider for review/approval because:  Has Uric Acid on file in past 12 months and value is less than 6 \"   If level is 6mg/dL or greater, ok to refill one time and refer to provider\"                    "

## 2023-07-16 NOTE — ED PROVIDER NOTES
[facility] DE[facility]MENT ([facility])    7/16/23       ED PROVIDER NOTE    History     Chief Complaint   Patient presents with     Hand Pain     HPI  Betty Tee is an 83 year old female with past medical history significant for osteopenia, severe gout currently taking Anakinra to help with her joint pain, and osteoarthritis of right hip s/p arthoplasty (5/31/2023) who presents to the ED for increasing right hand, left knee, and feet pain that began two weeks ago but worsened this past week. During phone consult this morning, Dr. Lopez noted that two weeks ago she began to feel pain in both her hands which has now spread to her left knee and feet.  Patient does endorse that this briefly went away and got better, but starting the last day or so has gotten severely worse.  Patient reports that she's had previous gout flares in her foot which are usually responsive to an Anakinra shot however this time she didn't feel much relief after administering one. Provider recommended patient to visit our ED for evaluation.     Patient does endorse that now it is feeling better than it was this morning overall.  Notes that her left knee was very swollen and painful at the time, and that is gotten better to the point where it is almost entirely normal.  Notes most of his pain is in her right wrist as well as all of her toes.    Past Medical History  Past Medical History:   Diagnosis Date     Alcohol abuse, in remission      Allergic rhinitis, cause unspecified     allegra helps when she takes it     Antiplatelet or antithrombotic long-term use      Atrial fibrillation (H)     in hosp in 11/11 after surgery w/ fluid overload     Cardiomegaly     LVH on stress echo- cardiac w/u at [facility] ER- neg CT scan for PE, neg stress echo in 8/06     Chest pain, unspecified      Congestive heart failure (H)      Depressive disorder      Diabetes (H)      Disorder of bone and cartilage, unspecified     osteopenia (had  been on prempro), improved on 6/06 dexa, stable dexa 11/10     Diverticulosis of colon (without mention of hemorrhage)     last episode yrs ago     Essential hypertension, benign      Follicular bronchiolitis (H)     associated with Sjogrens, dx by chest CT showing mosaic attenuation and air trapping     Gastro-oesophageal reflux disease      ILD (interstitial lung disease) (H)     associated with Sjogrens, also has mildly elevated IgG4, first noted on chest CT 2015 (mild changes) and also has small airways disease; ILD improved on follow up chest CT 2018.     Insomnia, unspecified     weaned off clonazepam     Irregular heart beat      Lumbago 07/2009    MRI with DJD, now seeing Dr. Cain for sciatic sx's     Major depressive disorder, recurrent episode, moderate (H)      Obstructive sleep apnea     uses cpap     Osteoarthrosis, unspecified whether generalized or localized, unspecified site      Sjogren's syndrome (H)     + RG and SSA and lip bx     Sleep apnea     uses a cpap machine     Tobacco use disorder     chantix in 9/07, started again in 6/08, working     Past Surgical History:   Procedure Laterality Date     APPENDECTOMY       ARTHROPLASTY HIP Right 5/31/2023    Procedure: Right total hip arthroplasty;  Surgeon: Thomas Shannon MD;  Location: [facility] OR     BACK SURGERY  1962     BACK SURGERY  1962     BIOPSY BREAST  09/27/2002    Biopsy Left Breast     BIOPSY BREAST Left 09/27/2002     BREAST SURGERY       CARDIAC SURGERY       CARDIAC SURGERY       CHOLECYSTECTOMY  1990's?     CHOLECYSTECTOMY       COLECTOMY LEFT  11/07/2011    Procedure:COLECTOMY LEFT; Laparoscopic mobilization of splenic flexture, sigmoid colectomy, coloprotoscopy, loop illeostomy; Surgeon:CK CASTANEDA; Location:[facility] OR     COLECTOMY LEFT  11/07/2011     COLONOSCOPY  1990,s     COLONOSCOPY N/A 5/3/2022    Procedure: COLONOSCOPY;  Surgeon: Guru Winsome Dias MD;  Location: [facility] GI     CV LEFT ATRIAL APPENDAGE CLOSURE  N/A 9/26/2022    Procedure: Left Atrial Appendage Closure;  Surgeon: Uzair Crews MD;  Location: [facility]ATH [facility]     ENT SURGERY       EYE SURGERY  2012     FLEXIBLE SIGMOIDOSCOPY  11/03/2011     HYSTERECTOMY TOTAL ABDOMINAL, BILATERAL SALPINGO-OOPHORECTOMY, COMBINED  11/07/2011    Procedure:COMBINED HYSTERECTOMY TOTAL ABDOMINAL, BILATERAL SALPINGO-OOPHORECTOMY; total abdominal hysterectomy, bilateral salpingo-oophorectomy; Surgeon:ALETA MANUEL; Location:[facility] OR     HYSTERECTOMY TOTAL ABDOMINAL, BILATERAL SALPINGO-OOPHORECTOMY, COMBINED  11/07/2011     ILEOSTOMY  02/01/2012    takedown loop ileostomy      INSERT STENT URETER  11/07/2011    Procedure:INSERT STENT URETER; Placement of Bilateral Ureteral Stents ; Surgeon:PRANEETH BRYANT; Location:[facility] OR     SIGMOIDECTOMY  02/01/2012    Dr. Castaneda, Sigmoidectomy for diverticular abscess, iliostomy afterwards until repair     SIGMOIDOSCOPY FLEXIBLE  11/03/2011    Procedure:SIGMOIDOSCOPY FLEXIBLE; Flexible Sigmoidoscopy; Surgeon:CK CASTANEDA; Location:[facility] OR     TAKEDOWN ILEOSTOMY  02/01/2012    Procedure:TAKEDOWN ILEOSTOMY; Takedown Loop Ileostomy ; Surgeon:CK CASTANEDA; Location:[facility] OR     URETERAL STENT PLACEMENT  11/07/2011     ZZC APPENDECTOMY  1970's?     ZZC NONSPECIFIC PROCEDURE  11/2005    exploratory abd lap, adhesions, resolved RLQ pain, diverticulitis episodes     ACCU-CHEK SHANNON PLUS test strip  acetaminophen (TYLENOL) 500 MG tablet  acyclovir (ZOVIRAX) 400 MG tablet  acyclovir (ZOVIRAX) 5 % external ointment  albuterol (PROAIR HFA/PROVENTIL HFA/VENTOLIN HFA) 108 (90 Base) MCG/ACT inhaler  alendronate (FOSAMAX) 70 MG tablet  allopurinol (ZYLOPRIM) 100 MG tablet  anakinra (KINERET) 100 MG/0.67ML SOSY injection  aspirin (ASA) 81 MG EC tablet  atorvastatin (LIPITOR) 10 MG tablet  azithromycin (ZITHROMAX) 250 MG tablet  BD PEN NEEDLE JANE 2ND GEN 32G X 4 MM miscellaneous  blood glucose (NO BRAND SPECIFIED) lancets standard  BREO ELLIPTA  100-25 MCG/ACT inhaler  cevimeline (EVOXAC) 30 MG capsule  Cholecalciferol (VITAMIN D3) 50 MCG (2000 UT) CAPS  COMPOUNDED NON-CONTROLLED SUBSTANCE (CMPD RX) - PHARMACY TO MIX COMPOUNDED MEDICATION  cyanocobalamin (VITAMIN B-12) 1000 MCG tablet  escitalopram (LEXAPRO) 20 MG tablet  ferrous gluconate (FERGON) 324 (38 Fe) MG tablet  fluticasone (FLONASE) 50 MCG/ACT nasal spray  HYDROmorphone (DILAUDID) 2 MG tablet  hydroxychloroquine (PLAQUENIL) 200 MG tablet  hydrOXYzine (ATARAX) 25 MG tablet  insulin lispro (HUMALOG KWIKPEN) 100 UNIT/ML (1 unit dial) KWIKPEN  ketoconazole (NIZORAL) 2 % external cream  KLOR-CON 20 MEQ CR tablet  lidocaine (LIDODERM) 5 % patch  loratadine (CLARITIN) 10 MG tablet  methocarbamol (ROBAXIN) 750 MG tablet  metoprolol succinate ER (TOPROL XL) 50 MG 24 hr tablet  montelukast (SINGULAIR) 10 MG tablet  OZEMPIC, 1 MG/DOSE, 4 MG/3ML pen  pantoprazole (PROTONIX) 40 MG EC tablet  polyethylene glycol (MIRALAX) 17 g packet  predniSONE (DELTASONE) 5 MG tablet  senna-docusate (SENOKOT-S/PERICOLACE) 8.6-50 MG tablet  tiZANidine (ZANAFLEX) 2 MG tablet  torsemide (DEMADEX) 20 MG tablet      Allergies   Allergen Reactions     Amoxicillin-Pot Clavulanate Nausea and Vomiting     Amoxicillin-Pot Clavulanate      Codeine Nausea and Vomiting     PN: LW Reaction: HIVES     Penicillins Nausea and Vomiting     PN: LW Reaction: GI Upset     Phenobarbital Itching     Seasonal Allergies      Family History  Family History   Problem Relation Age of Onset     C.A.D. Mother 63        MI- first at age 63     Heart Disease Mother      Hypertension Mother      Cerebrovascular Disease Mother      Hyperlipidemia Mother      Coronary Artery Disease Mother         MI-first at age 63     Other - See Comments Mother         cerebrovascular disease      Alcohol/Drug Father      Alzheimer Disease Father      Dementia Father      Hypertension Father      Hyperlipidemia Father      Substance Abuse Father      Diabetes Sister       Hypertension Sister      Hyperlipidemia Sister      Substance Abuse Sister      Asthma Sister      C.A.D. Sister 52        Minor MI- age 50's     Heart Disease Sister      Hypertension Sister      Hypertension Sister      Cancer - colorectal Sister 48        Late 40's early 50's     Gastrointestinal Disease Sister         Diverticulitis     Lipids Sister      Lipids Sister      Diabetes Sister      Heart Disease Sister         CHF     Cancer Sister         lung, smoker     Substance Abuse Sister      Asthma Sister      Cancer Sister      Coronary Artery Disease Sister         minor MI-age 50's     Heart Disease Sister      Hypertension Sister      Hypertension Sister      Colon Cancer Sister      Diverticulitis Sister      Lung Cancer Sister      Heart Disease Sister         CHF     Diabetes Sister      Asthma Sister      Hypertension Brother      Prostate Cancer Brother 74        Dx'd age 74     Gastrointestinal Disease Brother         Diverticulitis     Parkinsonism Brother      Substance Abuse Brother      Prostate Cancer Brother      Hyperlipidemia Brother      Hypertension Brother      Prostate Cancer Brother      Diverticulitis Brother      Parkinsonism Brother      Breast Cancer Daughter      Breast Cancer Daughter      Diabetes Other      Hypertension Other      Anesthesia Reaction No family hx of      Deep Vein Thrombosis (DVT) No family hx of      Social History   Social History     Tobacco Use     Smoking status: Former     Packs/day: 0.50     Types: Cigarettes     Start date:      Quit date: 2011     Years since quittin.9     Smokeless tobacco: Never     Tobacco comments:     1/2 ppd   Vaping Use     Vaping Use: Never used   Substance Use Topics     Alcohol use: No     Alcohol/week: 0.0 standard drinks of alcohol     Comment: In recovery beginning      Drug use: No      Past medical history, past surgical history, medications, allergies, family history, and social history were  "reviewed with the patient. No additional pertinent items.      A medically appropriate review of systems was performed with pertinent positives and negatives noted in the HPI, and all other systems negative.    Physical Exam   BP: (!) 141/64  Pulse: 53  Temp: 98.2  F (36.8  C)  Height: 167.6 cm (5' 6\")  Weight: 80.3 kg (177 lb)  Physical Exam  GEN: Well appearing, non toxic, cooperative  HEENT: normocephalic and atraumatic, PERRLA, EOMI  CV: well-perfused, normal skin color for ethnicity, regular rate and rhythm  PULM: breathing comfortably, in no respiratory distress, clear to the patient upper and lower lung fields  ABD: nondistended  EXT: Full range of motion.  No edema.   Right hand: Pain and swelling overlying the right first MCP joint with tenderness to palpation.  Limited range of motion of her second finger which she notes is been there for years   Left knee: Warm to palpation compared to the other knee.  Small if any joint effusion noted.  Completely normal range of motion with no limitations, mild tenderness to palpation of the medial and lateral side   Right first MTP no significant swelling, or erythema, but significant tenderness to palpation  NEURO: awake, conversant, grossly normal bilateral upper and lower extremity strength & ROM   SKIN: No rashes, ecchymosis, or lacerations  PSYCH: Calm and cooperative, interactive      ED Course, Procedures, & Data      Procedures          Results for orders placed or performed during the hospital encounter of 07/16/23   XR Hand Right 3 Views     Status: None    Narrative    EXAM: XR HAND RIGHT G/E 3 VIEWS  LOCATION: [facility]  DATE: 7/16/2023    INDICATION: pain, swelling  COMPARISON: None.      Impression    IMPRESSION: No acute fracture or dislocation. Vascular calcifications are noted. There is advanced thumb carpometacarpal degenerative arthrosis. There is mild triscaphe and scattered interphalangeal degenerative " arthrosis.   XR Toe Right 2 Views     Status: None    Narrative    EXAM: XR TOE RIGHT G/E 2 VIEWS  LOCATION: [facility]  DATE: 7/16/2023    INDICATION: great toe pain, ?gout  COMPARISON: None.      Impression    IMPRESSION: Faint linear soft tissue calcification medial to the medial eminence of the great toe. Findings could be due to dystrophic soft tissue calcification or a gouty tophus. No erosions are evident. Minimal hypertrophic change in the first MTP   joint. Atheromatous calcification.   XR Knee Left 1/2 Views     Status: None    Narrative    EXAM: XR KNEE LEFT 1/2 VIEWS  LOCATION: [facility]  DATE: 7/16/2023    INDICATION: pain, swelling  COMPARISON: None.      Impression    IMPRESSION: No acute fracture or dislocation. There is a knee joint effusion. There is chondrocalcinosis. There is tricompartment marginal osteophytes with some mild patellofemoral compartment joint space narrowing.   [place] Draw     Status: None (In process)    Narrative    The following orders were created for panel order [place] Draw.  Procedure                               Abnormality         Status                     ---------                               -----------         ------                     Extra Blue Top Tube[[id number]]                              Final result               Extra Red Top Tube[[id number]]                               Final result               Extra Green Top (Lithium...[[id number]]                                                 Extra Purple Top Tube[[id number]]                            Final result               Extra Green Top (Lithium...[[id number]]                      Final result                 Please view results for these tests on the individual orders.   Extra Blue Top Tube     Status: None   Result Value Ref Range    Hold Specimen JIC    Extra Red Top Tube     Status: None   Result Value Ref Range     Hold Specimen JI    Extra Purple Top Tube     Status: None   Result Value Ref Range    Hold Specimen JIC    Extra Green Top (Lithium Heparin) ON ICE     Status: None   Result Value Ref Range    Hold Specimen JIC    Basic metabolic panel     Status: Abnormal   Result Value Ref Range    Sodium 136 136 - 145 mmol/L    Potassium 4.2 3.4 - 5.3 mmol/L    Chloride 99 98 - 107 mmol/L    Carbon Dioxide (CO2) 25 22 - 29 mmol/L    Anion Gap 12 7 - 15 mmol/L    Urea Nitrogen 13.5 8.0 - 23.0 mg/dL    Creatinine 0.86 0.51 - 0.95 mg/dL    Calcium 9.2 8.8 - 10.2 mg/dL    Glucose 262 (H) 70 - 99 mg/dL    GFR Estimate 67 >60 mL/min/1.73m2   Erythrocyte sedimentation rate auto     Status: Normal   Result Value Ref Range    Erythrocyte Sedimentation Rate 27 0 - 30 mm/hr   CRP inflammation     Status: Abnormal   Result Value Ref Range    CRP Inflammation 68.40 (H) <5.00 mg/L   Uric acid     Status: Normal   Result Value Ref Range    Uric Acid 4.7 2.4 - 5.7 mg/dL   CBC with platelets and differential     Status: Abnormal   Result Value Ref Range    WBC Count 9.3 4.0 - 11.0 10e3/uL    RBC Count 3.93 3.80 - 5.20 10e6/uL    Hemoglobin 11.2 (L) 11.7 - 15.7 g/dL    Hematocrit 36.9 35.0 - 47.0 %    MCV 94 78 - 100 fL    MCH 28.5 26.5 - 33.0 pg    MCHC 30.4 (L) 31.5 - 36.5 g/dL    RDW 14.1 10.0 - 15.0 %    Platelet Count 211 150 - 450 10e3/uL    % Neutrophils 80 %    % Lymphocytes 7 %    % Monocytes 11 %    % Eosinophils 2 %    % Basophils 0 %    % Immature Granulocytes 0 %    NRBCs per 100 WBC 0 <1 /100    Absolute Neutrophils 7.4 1.6 - 8.3 10e3/uL    Absolute Lymphocytes 0.7 (L) 0.8 - 5.3 10e3/uL    Absolute Monocytes 1.0 0.0 - 1.3 10e3/uL    Absolute Eosinophils 0.2 0.0 - 0.7 10e3/uL    Absolute Basophils 0.0 0.0 - 0.2 10e3/uL    Absolute Immature Granulocytes 0.0 <=0.4 10e3/uL    Absolute NRBCs 0.0 10e3/uL   CBC with platelets differential     Status: Abnormal    Narrative    The following orders were created for panel order CBC with  platelets differential.  Procedure                               Abnormality         Status                     ---------                               -----------         ------                     CBC with platelets and d...[[id number]]  Abnormal            Final result                 Please view results for these tests on the individual orders.     Medications   acetaminophen (TYLENOL) tablet 975 mg (has no administration in time range)     Labs Ordered and Resulted from Time of ED Arrival to Time of ED Departure   BASIC METABOLIC PANEL - Abnormal       Result Value    Sodium 136      Potassium 4.2      Chloride 99      Carbon Dioxide (CO2) 25      Anion Gap 12      Urea Nitrogen 13.5      Creatinine 0.86      Calcium 9.2      Glucose 262 (*)     GFR Estimate 67     CRP INFLAMMATION - Abnormal    CRP Inflammation 68.40 (*)    CBC WITH PLATELETS AND DIFFERENTIAL - Abnormal    WBC Count 9.3      RBC Count 3.93      Hemoglobin 11.2 (*)     Hematocrit 36.9      MCV 94      MCH 28.5      MCHC 30.4 (*)     RDW 14.1      Platelet Count 211      % Neutrophils 80      % Lymphocytes 7      % Monocytes 11      % Eosinophils 2      % Basophils 0      % Immature Granulocytes 0      NRBCs per 100 WBC 0      Absolute Neutrophils 7.4      Absolute Lymphocytes 0.7 (*)     Absolute Monocytes 1.0      Absolute Eosinophils 0.2      Absolute Basophils 0.0      Absolute Immature Granulocytes 0.0      Absolute NRBCs 0.0     ERYTHROCYTE SEDIMENTATION RATE AUTO - Normal    Erythrocyte Sedimentation Rate 27     URIC ACID - Normal    Uric Acid 4.7       XR Toe Right 2 Views   Final Result   IMPRESSION: Faint linear soft tissue calcification medial to the medial eminence of the great toe. Findings could be due to dystrophic soft tissue calcification or a gouty tophus. No erosions are evident. Minimal hypertrophic change in the first MTP    joint. Atheromatous calcification.      XR Knee Left 1/2 Views   Final Result   IMPRESSION: No acute  fracture or dislocation. There is a knee joint effusion. There is chondrocalcinosis. There is tricompartment marginal osteophytes with some mild patellofemoral compartment joint space narrowing.      XR Hand Right 3 Views   Final Result   IMPRESSION: No acute fracture or dislocation. Vascular calcifications are noted. There is advanced thumb carpometacarpal degenerative arthrosis. There is mild triscaphe and scattered interphalangeal degenerative arthrosis.             Critical care was not performed.     Medical Decision Making  The patient's presentation was of high complexity (a chronic illness severe exacerbation, progression, or side effect of treatment).    The patient's evaluation involved:  an assessment requiring an independent historian (see separate area of note for details)  review of external note(s) from 3+ sources (see separate area of note for details)  ordering and/or review of 3+ test(s) in this encounter (see separate area of note for details)  review of 3+ test result(s) ordered prior to this encounter (see separate area of note for details)    The patient's management necessitated moderate risk (prescription drug management including medications given in the ED).      Assessment & Plan    83-year-old female with a past medical history of severe gout requiring anakinra injections presenting to the emergency department due to acute on subacute pain in her right wrist, left knee and right great toe noted to have a flare a couple of weeks ago that had gradually gotten better, and then with a 1 day history of severe worsening, increasing swelling, and pain.  No fevers or chills.  The pain is primarily worse in her hand, then in her left knee and then patient had not noted any pain in her right great toe until palpation on exam.  Fairly low suspicion overall for septic joint especially as she has multiple joints involved, and range of motion is not limited.  She does have significant tenderness to  palpation in each of these joints however.  The right wrist is fairly swollen, but without a definitive joint fluid collection.  Did discuss aspiration as the gold standard to rule out infection of these joints, and patient declines at this time.    We will plan for labs, x-ray of her 3 joints involved, and acetaminophen in the meantime.    Patient noted to have intolerance to colchicine in the past, and due to her age, did not recommend NSAIDs.  Previously had tolerated steroid burst as a treatment for her symptoms that were resistant to anakinra treatment    8:01 PM labs reassuring aside from elevated ESR.  Discussed this with the patient and that the definitive way to test would be to take fluid sample.  Patient is not interested in doing this at this time.  Would like to treat as though this is a gout flare as she is in agreement that this is less likely what is going on.  We will plan for steroid burst as she has not been able to tolerate colchicine in the past.  Current Discharge Medication List      START taking these medications    Details   !! predniSONE (DELTASONE) 20 MG tablet Take 2 tabs by mouth daily x 3 days, then 1 tabs daily x 3 days, then 1/2 tab daily x 3 days  Qty: 11 tablet, Refills: 0       !! - Potential duplicate medications found. Please discuss with provider.          I have reviewed the nursing notes. I have reviewed the findings, diagnosis, plan and need for follow up with the patient.    Current Discharge Medication List      START taking these medications    Details   !! predniSONE (DELTASONE) 20 MG tablet Take 2 tabs by mouth daily x 3 days, then 1 tabs daily x 3 days, then 1/2 tab daily x 3 days  Qty: 11 tablet, Refills: 0       !! - Potential duplicate medications found. Please discuss with provider.          Final diagnoses:   Idiopathic chronic gout of multiple sites with tophus     Claudia SHERIDAN, am serving as a trained medical scribe to document services personally  performed by Kendra Raymond MD based on the provider's statements to me on July 16, 2023.  This document has been checked and approved by the attending provider.    I, Kendra Raymond MD, was physically present and have reviewed and verified the accuracy of this note documented by Claudia Baker, medical scribe.      Kendra Raymond MD  [facility] EMERGENCY DEPARTMENT  7/16/2023     Kendra Raymond MD  07/16/23 2016

## 2023-07-17 ENCOUNTER — TELEPHONE (OUTPATIENT)
Dept: FAMILY MEDICINE | Facility: CLINIC | Age: 83
End: 2023-07-17

## 2023-07-17 ENCOUNTER — TELEPHONE (OUTPATIENT)
Dept: FAMILY MEDICINE | Facility: CLINIC | Age: 83
End: 2023-07-17
Payer: MEDICARE

## 2023-07-17 NOTE — DISCHARGE INSTRUCTIONS
You are seen in the emergency department due to pain in your wrist, knee and toe.  You had labs here that show elevated inflammatory markers but otherwise no signs of infection.  We discussed that the only way to know for sure that this is not due to an infection would be to take fluid from your joint and check this.  You are not interested in doing that today.  At this point time is most likely that this is a flare of gout.  We sent you home with a steroid burst for you to take over the next 10 days, and we recommend that you follow-up with your PCP in the next few days to see how you are doing.    Return to the emergency department with any worsening severe symptoms including any fever, worsening severe pain or swelling, redness, confusion or any other concerns

## 2023-07-17 NOTE — TELEPHONE ENCOUNTER
Forms/Letter Request    Type of form/letter: STEVEN choi nursing asssement 7/3/2023 to 8/31/2023 placed in CW inbox   Yes:     Have you been seen for this request: Yes     Do we have the form/letter: Yes:  order    Who is the form from? Home care    Where did/will the form come from? form was faxed in    When is form/letter needed by:     How would you like the form/letter returned: Fax : [phone]    Patient Notified form requests are processed in 3-5 business days:Yes    Could we send this information to you in [employer] or would you prefer to receive a phone call?:   No preference   Okay to leave a detailed message?: Yes at Other phone number:  [phone]

## 2023-07-17 NOTE — TELEPHONE ENCOUNTER
KP CR  Please see below  Verbal order provided for OT 1x/week for 2 weeks  Thanks,  Elise RIVERA RN      Home Care is calling regarding an established patient with [facility].        7/17/2023   Home Care Information   Date of Home Care episode start 7/5/2023   Current following provider Dr. Kun Childress provider agreed to follow 7/5/2023    Name/Phone Number Maribell / [phone]   Home Care agency [facility]     Requesting orders from: Ilene Tristan  Provider is following patient: Yes  Is this a 60-day recertification request?  No    Orders Requested    Occupational Therapy  Request for continuation of care with no increase or decrease in frequency  Frequency: 1x/wk for 2 wks          Verbal orders given.  Home Care will send orders for provider to sign.  Confirmed ok to leave a detailed message with call back.  Contact information confirmed and updated as needed.    Elise Dsouza RN

## 2023-07-17 NOTE — TELEPHONE ENCOUNTER
Order/Referral Request    Who is requesting: Gavin PT with [facility]    Orders being requested: VO started new med Prednisone 40mg a day for 3 days , 20mg a day for 3 days, 10mg a day for 3 days, in addition to the one shes already taking. It is being taken for Gout flare up.    Reason service is needed/diagnosis: unk    When are orders needed by: ASAP     Has this been discussed with Provider: No    Does patient have a preference on a Group/Provider/Facility?     Does patient have an appointment scheduled?: No    Where to send orders: call with orders

## 2023-07-17 NOTE — TELEPHONE ENCOUNTER
Order/Referral Request    [facility]    Orders being requested: PT evaluate and treat    Reason service is needed/diagnosis: Right total hip replacement    When are orders needed by: ASAP-Has some homecare time left    Has this been discussed with Provider: No    Does patient have a preference on a Group/Provider/Facility? Same place    Does patient have an appointment scheduled?: No    Where to send orders: [phone]    Could we send this information to you in [facility] or would you prefer to receive a phone call?:   Patient would prefer a phone call   Okay to leave a detailed message?: Yes at Other phone number:  [phone]-homecare facility main number

## 2023-07-18 ENCOUNTER — MEDICAL CORRESPONDENCE (OUTPATIENT)
Dept: HEALTH INFORMATION MANAGEMENT | Facility: CLINIC | Age: 83
End: 2023-07-18
Payer: MEDICARE

## 2023-07-18 NOTE — TELEPHONE ENCOUNTER
Left message for patient to call [facility] back  When patient calls back please transfer to an RN    Has patient already started prednisone prescription?  Sandra ANTUNEZ RN

## 2023-07-20 ENCOUNTER — TELEPHONE (OUTPATIENT)
Dept: FAMILY MEDICINE | Facility: CLINIC | Age: 83
End: 2023-07-20

## 2023-07-20 ENCOUNTER — ANCILLARY PROCEDURE (OUTPATIENT)
Dept: GENERAL RADIOLOGY | Facility: CLINIC | Age: 83
End: 2023-07-20
Attending: ORTHOPAEDIC SURGERY
Payer: MEDICARE

## 2023-07-20 ENCOUNTER — OFFICE VISIT (OUTPATIENT)
Dept: ORTHOPEDICS | Facility: CLINIC | Age: 83
End: 2023-07-20
Payer: MEDICARE

## 2023-07-20 DIAGNOSIS — Z96.641 STATUS POST TOTAL REPLACEMENT OF RIGHT HIP: Primary | ICD-10-CM

## 2023-07-20 DIAGNOSIS — Z96.641 STATUS POST TOTAL REPLACEMENT OF RIGHT HIP: ICD-10-CM

## 2023-07-20 PROCEDURE — 73502 X-RAY EXAM HIP UNI 2-3 VIEWS: CPT | Mod: RT | Performed by: RADIOLOGY

## 2023-07-20 PROCEDURE — 99024 POSTOP FOLLOW-UP VISIT: CPT | Performed by: ORTHOPAEDIC SURGERY

## 2023-07-20 NOTE — LETTER
7/20/2023         RE: Betty Tee  [address]  [facility] [address]        Dear Colleague,    Thank you for referring your patient, Betty Tee, to the [facility] [place]. Please see a copy of my visit note below.    Chief Complaint: RECHECK (12 week post op right SRI)       HPI: Betty Tee returns today in follow-up for total hip.  Reports doing great.  She reports she has little or no pain.  She is walking.  Very happy with how she is doing.  Here with Sister Nghia.    YANNA: 85.26    Medications and allergies are documented in the EMR and have been reviewed.    Current Outpatient Medications:     ACCU-CHEK SHANNON PLUS test strip, USE TO TEST BLOOD SUGARS 3 TIMES DAILY, Disp: 300 strip, Rfl: 5    acetaminophen (TYLENOL) 500 MG tablet, Take 2 tablets (1,000 mg) by mouth every 8 hours as needed (max 6 tablets/24 hours, 2 tablets/dose), Disp: 60 tablet, Rfl: 0    acyclovir (ZOVIRAX) 400 MG tablet, Take 1 tablet (400 mg) by mouth every 8 hours For a couple days, Disp: 15 tablet, Rfl: 2    acyclovir (ZOVIRAX) 5 % external ointment, Apply topically as needed (6 times day PRN for outbreaks) As needed for outbreaks, Disp: 15 g, Rfl: 3    albuterol (PROAIR HFA/PROVENTIL HFA/VENTOLIN HFA) 108 (90 Base) MCG/ACT inhaler, Inhale 2 puffs into the lungs every 6 hours as needed for wheezing or shortness of breath, Disp: , Rfl:     alendronate (FOSAMAX) 70 MG tablet, Take 1 tablet (70 mg) by mouth every 7 days, Disp: 12 tablet, Rfl: 1    allopurinol (ZYLOPRIM) 100 MG tablet, Take 1 tablet (100 mg) by mouth daily, Disp: 90 tablet, Rfl: 0    anakinra (KINERET) 100 MG/0.67ML SOSY injection, 100 mg every day x 3 days as needed for flare ups, Disp: 6.7 mL, Rfl: 3    aspirin (ASA) 81 MG EC tablet, Take 1 tablet (81 mg) by mouth 2 times daily, Disp: 60 tablet, Rfl: 0    atorvastatin (LIPITOR) 10 MG tablet, TAKE 1/2 TABLET BY MOUTH EVERY DAY, Disp: 45 tablet, Rfl: 3     azithromycin (ZITHROMAX) 250 MG tablet, TAKE 1 TABLET BY MOUTH EVERY DAY, Disp: 30 tablet, Rfl: 5    BD PEN NEEDLE JANE 2ND GEN 32G X 4 MM miscellaneous, USE TO TEST 4 TIMES A DAY, Disp: 400 each, Rfl: 1    blood glucose (NO BRAND SPECIFIED) lancets standard, Use to test blood sugar 3 times daily or as directed, Disp: 100 lancet, Rfl: 3    BREO ELLIPTA 100-25 MCG/ACT inhaler, TAKE 1 PUFF BY MOUTH EVERY DAY, Disp: 1 each, Rfl: 11    cevimeline (EVOXAC) 30 MG capsule, Take 1 capsule (30 mg) by mouth 3 times daily as needed, Disp: , Rfl:     Cholecalciferol (VITAMIN D3) 50 MCG (2000 UT) CAPS, TAKE 100 MCG BY MOUTH DAILY (TAKE 2 TABLET (50 MCG) BY MOUTH DAILY - ORAL), Disp: 180 capsule, Rfl: 0    COMPOUNDED NON-CONTROLLED SUBSTANCE (CMPD RX) - PHARMACY TO MIX COMPOUNDED MEDICATION, Estriol 1 mg/g Apply small amount to finger and apply to inside vagina daily for 2 weeks then twice weekly Route: vaginally Dispense 30 grams 11 refills, Disp: 30 g, Rfl: 11    cyanocobalamin (VITAMIN B-12) 1000 MCG tablet, Take 1 tablet (1,000 mcg) by mouth three times a week, Disp: , Rfl:     escitalopram (LEXAPRO) 20 MG tablet, TAKE 1 TABLET BY MOUTH EVERY DAY, Disp: 90 tablet, Rfl: 1    ferrous gluconate (FERGON) 324 (38 Fe) MG tablet, Take 1 tablet (324 mg) by mouth three times a week, Disp: , Rfl:     fluticasone (FLONASE) 50 MCG/ACT nasal spray, SPRAY 1-2 SPRAYS INTO BOTH NOSTRILS DAILY AS NEEDED FOR ALLERGIES, Disp: 16 mL, Rfl: 11    hydroxychloroquine (PLAQUENIL) 200 MG tablet, Take 1 tablet (200 mg) by mouth daily Get annual eye exams for hydroxychloroquine (Plaquenil) monitoring and fax to [phone], Disp: 90 tablet, Rfl: 1    insulin lispro (HUMALOG KWIKPEN) 100 UNIT/ML (1 unit dial) KWIKPEN, Inject Subcutaneous 3 times daily (before meals) Sliding scale insulin; tests BG three times day If BG <150, no insulin given  If -200 take 2 units If -250 take 4 units If -300 take 6 units If -350 take 10 units If  BG >350 take 14 units, Disp: , Rfl:     ketoconazole (NIZORAL) 2 % external cream, Apply topically 2 times daily as needed for itching, Disp: 60 g, Rfl: 1    KLOR-CON 20 MEQ CR tablet, TAKE 2 TABLETS (40 MEQ) BY MOUTH EVERY MORNING AND 1 TABLET (20 MEQ) EVERY EVENING., Disp: 270 tablet, Rfl: 3    lidocaine (LIDODERM) 5 % patch, APPLY PATCH TO PAINFUL AREA FOR UP TO 12 H WITHIN A 24 H PERIOD. REMOVE AFTER 12 HOURS. (Patient taking differently: Apply patch to painful area for up to 12 h within a 24 h period.  Remove after 12 hours.), Disp: 30 patch, Rfl: 2    loratadine (CLARITIN) 10 MG tablet, TAKE 1 TABLET BY MOUTH EVERY DAY, Disp: 90 tablet, Rfl: 3    methocarbamol (ROBAXIN) 750 MG tablet, Take 1 tablet (750 mg) by mouth 3 times daily as needed for muscle spasms, Disp: 90 tablet, Rfl: 3    metoprolol succinate ER (TOPROL XL) 50 MG 24 hr tablet, Take 1 tablet (50 mg) by mouth 2 times daily, Disp: 180 tablet, Rfl: 3    montelukast (SINGULAIR) 10 MG tablet, TAKE 1 TABLET BY MOUTH EVERYDAY AT BEDTIME, Disp: 90 tablet, Rfl: 1    OZEMPIC, 1 MG/DOSE, 4 MG/3ML pen, INJECT 1 MG SUBCUTANEOUS ONCE A WEEK, Disp: 9 mL, Rfl: 1    pantoprazole (PROTONIX) 40 MG EC tablet, Take 1 tablet (40 mg) by mouth daily (replaces famotidine- should stop taking famotidine), Disp: 90 tablet, Rfl: 3    polyethylene glycol (MIRALAX) 17 g packet, Take 17 g by mouth daily, Disp: 10 packet, Rfl: 0    predniSONE (DELTASONE) 10 MG tablet, For gout flares, take the prednisone as 40-30-20-10 mg a day, each course x 3 days then go back to 5 mg a day, Disp: 30 tablet, Rfl: 3    predniSONE (DELTASONE) 5 MG tablet, Take 1 tablet (5 mg) by mouth daily, Disp: 90 tablet, Rfl: 1    torsemide (DEMADEX) 20 MG tablet, Take 2 tablets (40 mg) by mouth every morning AND 1 tablet (20 mg) every evening., Disp: 270 tablet, Rfl: 3    traZODone (DESYREL) 50 MG tablet, Take 1-2 tablets ( mg) by mouth At Bedtime, Disp: , Rfl:   Allergies: Amoxicillin-pot clavulanate,  Amoxicillin-pot clavulanate, Codeine, Penicillins, Phenobarbital, and Seasonal allergies    Physical Exam:  On physical examination the patient appears the stated age, is in no acute distress, affect is appropriate, and breathing is non-labored.  There were no vitals taken for this visit.  There is no height or weight on file to calculate BMI.  Gait: She walks about the room without a walker  Incision: Healed and benign  ROM: Full and painless  Distally, the circulatory, motor, and sensation exam is intact with 5/5 EHL, gastroc-soleus, and tibialis anterior.  Sensation to light touch is intact.  Dorsalis pedis and posterior tibialis pulses are palpable.  There are no sores on the feet, no bruising, and no lymphedema.    X-rays:    I reviewed the x-rays dated today.  Previous films reviewed.    Findings:  Normal progression for a total hip arthroplasty without evidence of loosening or subsidence.    Assessment: Doing very well.  We discussed that there is a total hip and slightly restricted.  Unable to walk much due to her pain.  All patient's questions answered best my ability.  Plan: Return to clinic in 6 weeks sooner if issues.    Referred Self          Thomas Shannon MD

## 2023-07-20 NOTE — NURSING NOTE
Reason For Visit:   Chief Complaint   Patient presents with     RECHECK     12 week post op right SRI       There were no vitals taken for this visit.         Ling Bethea ATC

## 2023-07-20 NOTE — TELEPHONE ENCOUNTER
Forms/Letter Request    Type of form/letter:   Verbal order date: 7/17/2023    Order description: Patient started on an additional dose of prednisone to treat gout flare-up...    Have you been seen for this request: N/A    Do we have the form/letter: Yes:   Faxed to the [facility]    Who is the form from?   [facility]    Where did/will the form come from? form was faxed in    When is form/letter needed by: n/a    How would you like the form/letter returned:   Fax: [phone]    Patient Notified form requests are processed in 3-5 business days:No    Could we send this information to you in [employer] or would you prefer to receive a phone call?:   N/a    Placed in Dr. Tristan's box.

## 2023-07-21 ENCOUNTER — OFFICE VISIT (OUTPATIENT)
Dept: RHEUMATOLOGY | Facility: CLINIC | Age: 83
End: 2023-07-21
Attending: INTERNAL MEDICINE
Payer: MEDICARE

## 2023-07-21 ENCOUNTER — MEDICAL CORRESPONDENCE (OUTPATIENT)
Dept: HEALTH INFORMATION MANAGEMENT | Facility: CLINIC | Age: 83
End: 2023-07-21
Payer: MEDICARE

## 2023-07-21 VITALS
OXYGEN SATURATION: 97 % | DIASTOLIC BLOOD PRESSURE: 72 MMHG | SYSTOLIC BLOOD PRESSURE: 130 MMHG | WEIGHT: 168 LBS | BODY MASS INDEX: 27.12 KG/M2 | HEART RATE: 54 BPM

## 2023-07-21 DIAGNOSIS — M85.80 OSTEOPENIA, UNSPECIFIED LOCATION: Primary | ICD-10-CM

## 2023-07-21 DIAGNOSIS — Z78.0 POST-MENOPAUSAL: ICD-10-CM

## 2023-07-21 DIAGNOSIS — Z79.52 CURRENT CHRONIC USE OF SYSTEMIC STEROIDS: ICD-10-CM

## 2023-07-21 DIAGNOSIS — M35.02 SJOGREN'S SYNDROME WITH LUNG INVOLVEMENT (H): ICD-10-CM

## 2023-07-21 PROCEDURE — G0463 HOSPITAL OUTPT CLINIC VISIT: HCPCS | Performed by: INTERNAL MEDICINE

## 2023-07-21 PROCEDURE — 99214 OFFICE O/P EST MOD 30 MIN: CPT | Mod: 24 | Performed by: INTERNAL MEDICINE

## 2023-07-21 RX ORDER — HYDROXYCHLOROQUINE SULFATE 200 MG/1
200 TABLET, FILM COATED ORAL DAILY
Qty: 90 TABLET | Refills: 1 | Status: SHIPPED | OUTPATIENT
Start: 2023-07-21 | End: 2024-06-07

## 2023-07-21 NOTE — PROGRESS NOTES
Rheumatology Clinic In Person Follow up Visit Note  Zulma Lopez MD  DOS: 2023  Date of last visit: 2023    Name: Betty Tee  MRN: [id number]  Age: 83 year old  : 1940  Reason for visit: Follow up visit for PMR, presumed gout, primary Sjogren's syndrome    # Sjogren's syndrome since  with borderline +RG, +SSA, and lip biopsy focus score of 1. Ongoing dry mouth on evoxac qod  # Follicular bronchiolitis, prior mild ILD   # Osteopenia on DEXA 2019 with T score=-2.1 with decline in bone density on fosamax  # Chronic prednisone use  # DM  # A fib  # Severe R hip OA  # PMR stable on prednisone 5 mg every day  #Presumed gout flare, recovered  #ESOB      Assessment and Plan:    New Dx of PMR. Severe R hip pain is due to severe OA based on 2020 hip MRI. She prefered to avoid hip arthroplasty in the past but it is quite bothersome and would like to see ortho. Her inflammation markers improved on prednisone, PMR responded to prednisone, now is 5 mg qd.     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 Completed pulmonary rehab in 2019 where she was able to exercise without oxygen. GFR stable.    On HCQ since 2019 with significant improvement of joint pain. No retinal toxicity on eye exam done 2021. Tolerates HCQ well.     Sister Betty recovered from flare of presumed gout (or pseudogout given previous nl SUA) over L foot. She responded to high dose prednisone (40-30-20-10 mg every day each for 3 days) in the past, now is on 5 mg every day.  Given her DM, osteopenia, highly recommend to start using anakinra prn for gout flares, no flares and has not required it yet.     She preferred in the past not to start daily urate-lowering therapy and her uric acid has been ~6 so allopurinol deferred.     2022: Sister Betty's major complaint at last visit was ESOB which is  improved after Tx of B12 deficiency, iron deficiency due to GIB. 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 Her major complaint today is severe R hip pain due to OA which eventually requires R hip replacement, but she needs to be medically clear for surgery. Discussed pain mx and advised follow up with ortho.      1/13/2023: Stable, no active gout today. B12/iron deficiency anemia has improved. On allopurinol 50 mg po every day.    Today 7/21/2023: On increased allopurinol 100 mg every day since 1/2023, severe polyarticular gout flare last week requiring ER visit and prednisone 40-30-20-10 mg every day, each x 3 days then 5 mg a day, as anakinra daily inj was not enough to control it.    Recovered from gout flare, discussed ongoing m/o gout.    Plan:    Prednisone 5 mg a day    Norco as needed for pain      Cevimeline for dry mouth could be taken 3 times a day (she takes it every other day as does not like to take so many pills)    Stay on fosamax weekly    Yearly eye exam on HCQ    Cut back on plaquenil to 1 tab a day    For gout flares: take anakinra daily shots x 5-7 days, if not enough, take the prednisone as 40-30-20-10 mg a day, each course x 3 days then go back to 5 mg a day    Return in 6 months (in person)    Labs today    Allopurinol 100 mg a day    Return in person in 6 months      No orders of the defined types were placed in this encounter.      Zulma Lopez MD         HPI:   Betty Tee is a 81 year old WF with a history of primary Sjogren's syndrome, PMR, OA who presents for follow-up. She was diagnosed with Sjogren's syndrome in 2015 with borderline +RG, +SSA, and lip biopsy focus score of 1. Associated ILD and joint pain are prednisone-responsive which support the diagnosis.     10/1/2021: No gout flares since last visit so has not needed anakinra. She continues to have R hip pain related to severe OA but does not want to pursue surgery. She recently started pool therapy  and began taking CBD oil yesterday. Is hoping to pursue medical marijuana in the future as she does not want to be dependent on norco. Aside from hip pain has otherwise been doing well.        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 She is on prednisone 5 mg a day, she was given prednisone taper recently for possible L foot gout flare which helped. Did not flare again to need using anakinra shots. She has severe R hip pain. Saw ortho, had R hip MRI on 9/5/2020 which showed severe OA, R hip arthroplasty was recommended. She would prefer to delay R hip arthroplasty, had R hip inj on 2/24, NSAIDs are not allowed with CKD. Norco helps but she does not like to be dependent on it, takes it rarely. No L foot pain today. Her hip/leg pain/back pain is getting better, going to PT, doing exercises at home, last time elbow massage caused pain. Epidural shot helped. Has dry mouth. SOB is stable at baseline.  Last 3 wk has been hard, her doctor took her off gabapentin, sweat/chills and loss of appetite. Reason was being on other meds. Now off gabapentin. Had several gushing bowel explosion, could not make it to the bathroom. Random, unpredictable. No triggers. Has had this problem for years.     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    No gout flares, has not required anakinra, it is in the fridge.    Has breathing issue, ESOB is worse. O2 level is good. Saw PCP, was recomemnded to do follow up with cardiology.    Getting R hip steroid inj, last one was for bursitis. Still hurts crissy today. Saw pain mx yesterday. Got minimal exercises to help moving.    7/8/2022:    Sister Betty presents for follow up. At last visit,w as found to be anemic which explained her SOB. She had iron deficiency and B12 deficiency. Was found to have GIB. Had multiple GI visits, EGD, was treated with cauterization x 3  for GAVE. This AM, had brown stool not black.    SOB is improved.    No gout flares  since last visit.    Her major complaint is severe R hip pain, not responding to current pain mx, considers hip arthroplasty, but was told to wait till anemia gets fixed, also has to figure out her heart condition.      1/13/2023:     No gout flare today  Anakinra prn helps with flares  SOB is better  Hip OA pain is the same, considers arthroplasty.      Today 7/21/2023:    Reviewed chart, labs, recent events.    -s/p R hip arthroplasty, recovered well  -Severe polyarticular gout flare last week requiring ER visit and prednisone 40-30-20-10 mg every day, each x 3 days then 5 mg a day, as anakinra qd inj was not enough to control it.  -better now    Review of Systems:   A comprehensive ROS was done. Positives are per HPI.      Active Medications:     Outpatient Medications Prior to Visit   Medication Sig Dispense Refill     ACCU-CHEK SHANNON PLUS test strip USE TO TEST BLOOD SUGARS 3 TIMES DAILY 300 strip 5     acetaminophen (TYLENOL) 500 MG tablet Take 2 tablets (1,000 mg) by mouth every 8 hours as needed (max 6 tablets/24 hours, 2 tablets/dose) 60 tablet 0     acyclovir (ZOVIRAX) 400 MG tablet Take 1 tablet (400 mg) by mouth every 8 hours For a couple days 15 tablet 2     acyclovir (ZOVIRAX) 5 % external ointment Apply topically as needed (6 times day PRN for outbreaks) As needed for outbreaks 15 g 3     albuterol (PROAIR HFA/PROVENTIL HFA/VENTOLIN HFA) 108 (90 Base) MCG/ACT inhaler Inhale 2 puffs into the lungs every 6 hours as needed for wheezing or shortness of breath       alendronate (FOSAMAX) 70 MG tablet Take 1 tablet (70 mg) by mouth every 7 days 12 tablet 1     allopurinol (ZYLOPRIM) 100 MG tablet Take 1 tablet (100 mg) by mouth daily 90 tablet 0     anakinra (KINERET) 100 MG/0.67ML SOSY injection 100 mg every day x 3 days as needed for flare ups 6.7 mL 3     aspirin (ASA) 81 MG EC tablet Take 1 tablet (81 mg) by mouth 2 times daily 60 tablet 0     atorvastatin (LIPITOR) 10 MG tablet TAKE 1/2 TABLET BY MOUTH  EVERY DAY 45 tablet 3     azithromycin (ZITHROMAX) 250 MG tablet TAKE 1 TABLET BY MOUTH EVERY DAY 30 tablet 5     BD PEN NEEDLE JANE 2ND GEN 32G X 4 MM miscellaneous USE TO TEST 4 TIMES A  each 1     blood glucose (NO BRAND SPECIFIED) lancets standard Use to test blood sugar 3 times daily or as directed 100 lancet 3     BREO ELLIPTA 100-25 MCG/ACT inhaler TAKE 1 PUFF BY MOUTH EVERY DAY 1 each 11     cevimeline (EVOXAC) 30 MG capsule Take 1 capsule (30 mg) by mouth 3 times daily as needed       Cholecalciferol (VITAMIN D3) 50 MCG (2000 UT) CAPS TAKE 100 MCG BY MOUTH DAILY (TAKE 2 TABLET (50 MCG) BY MOUTH DAILY - ORAL) 180 capsule 0     COMPOUNDED NON-CONTROLLED SUBSTANCE (CMPD RX) - PHARMACY TO MIX COMPOUNDED MEDICATION Estriol 1 mg/g Apply small amount to finger and apply to inside vagina daily for 2 weeks then twice weekly Route: vaginally Dispense 30 grams 11 refills 30 g 11     cyanocobalamin (VITAMIN B-12) 1000 MCG tablet Take 1 tablet (1,000 mcg) by mouth three times a week       escitalopram (LEXAPRO) 20 MG tablet TAKE 1 TABLET BY MOUTH EVERY DAY 90 tablet 1     ferrous gluconate (FERGON) 324 (38 Fe) MG tablet Take 1 tablet (324 mg) by mouth three times a week       fluticasone (FLONASE) 50 MCG/ACT nasal spray SPRAY 1-2 SPRAYS INTO BOTH NOSTRILS DAILY AS NEEDED FOR ALLERGIES 16 mL 11     HYDROmorphone (DILAUDID) 2 MG tablet Take 1 tablet (2 mg) by mouth every 4 hours as needed for moderate pain 26 tablet 0     hydroxychloroquine (PLAQUENIL) 200 MG tablet Take 2 tablets (400 mg) by mouth daily Get annual eye exams for hydroxychloroquine (Plaquenil) monitoring and fax to [phone] 180 tablet 3     hydrOXYzine (ATARAX) 25 MG tablet Take 1 tablet (25 mg) by mouth every 6 hours as needed for other (adjuvant pain) 40 tablet 0     insulin lispro (HUMALOG KWIKPEN) 100 UNIT/ML (1 unit dial) KWIKPEN Inject Subcutaneous 3 times daily (before meals) Sliding scale insulin; tests BG three times day  If BG <150, no  insulin given   If -200 take 2 units  If -250 take 4 units  If -300 take 6 units  If -350 take 10 units  If BG >350 take 14 units       ketoconazole (NIZORAL) 2 % external cream Apply topically 2 times daily as needed for itching 60 g 1     KLOR-CON 20 MEQ CR tablet TAKE 2 TABLETS (40 MEQ) BY MOUTH EVERY MORNING AND 1 TABLET (20 MEQ) EVERY EVENING. 270 tablet 3     lidocaine (LIDODERM) 5 % patch APPLY PATCH TO PAINFUL AREA FOR UP TO 12 H WITHIN A 24 H PERIOD. REMOVE AFTER 12 HOURS. (Patient taking differently: Apply patch to painful area for up to 12 h within a 24 h period.  Remove after 12 hours.) 30 patch 2     loratadine (CLARITIN) 10 MG tablet TAKE 1 TABLET BY MOUTH EVERY DAY 90 tablet 3     methocarbamol (ROBAXIN) 750 MG tablet Take 1 tablet (750 mg) by mouth 3 times daily as needed for muscle spasms 90 tablet 3     metoprolol succinate ER (TOPROL XL) 50 MG 24 hr tablet Take 1 tablet (50 mg) by mouth 2 times daily 180 tablet 3     montelukast (SINGULAIR) 10 MG tablet TAKE 1 TABLET BY MOUTH EVERYDAY AT BEDTIME 90 tablet 1     OZEMPIC, 1 MG/DOSE, 4 MG/3ML pen INJECT 1 MG SUBCUTANEOUS ONCE A WEEK 9 mL 1     pantoprazole (PROTONIX) 40 MG EC tablet Take 1 tablet (40 mg) by mouth daily (replaces famotidine- should stop taking famotidine) 90 tablet 3     polyethylene glycol (MIRALAX) 17 g packet Take 17 g by mouth daily 10 packet 0     predniSONE (DELTASONE) 20 MG tablet Take 2 tabs by mouth daily x 3 days, then 1 tabs daily x 3 days, then 1/2 tab daily x 3 days 11 tablet 0     predniSONE (DELTASONE) 5 MG tablet Take 1 tablet (5 mg) by mouth daily 90 tablet 1     senna-docusate (SENOKOT-S/PERICOLACE) 8.6-50 MG tablet Take 1 tablet by mouth 2 times daily 30 tablet 0     tiZANidine (ZANAFLEX) 2 MG tablet Take 1 tablet (2 mg) by mouth every 6 hours as needed for muscle spasms 30 tablet 0     torsemide (DEMADEX) 20 MG tablet Take 2 tablets (40 mg) by mouth every morning AND 1 tablet (20 mg) every  evening. 270 tablet 3     No facility-administered medications prior to visit.     Allergies:   Allergies   Allergen Reactions     Amoxicillin-Pot Clavulanate Nausea and Vomiting     Amoxicillin-Pot Clavulanate      Codeine Nausea and Vomiting     PN: LW Reaction: HIVES     Penicillins Nausea and Vomiting     PN: LW Reaction: GI Upset     Phenobarbital Itching     Seasonal Allergies         Past Medical History:  Alcohol abuse, in remission.   Allergic rhinitis.   Antiplatelet long-term use.   Atrial fibrillation.   Cardiomegaly.   Osteopenia.   Diverticulosis.   Benign essential hypertension.   GERD.   Insomnia.   Irregular heart beat.   Lumbago.   Major depressive disorder, recurrent episode, moderate.   ANGELICA.  Osteoarthrosis.   Sjogren's syndrome.   Sleep apnea.   Tobacco use disorder.   TMJ disorder.   RLS.  Major depressive disorder.   Hypertension.   Sciatica.   Colouterine fistula.   Left ventricular hypertrophy.   Breat fibroadenoma.   DVT.   Fatty liver disease, nonalcoholic.   Rib pain.   Neck mass.   Interstitial lung disease.   Acute and chronic respiratory failure with hypoxia.   Type II diabetes mellitus.   Hyperlipidemia.   Inflammatory arthritis.      Past Surgical History:  Back surgery.   Left breast biopsy.   Appendectomy.   Exploratory laparotomy.   Cardiac surgery.   Cholecystectomy.   Left colectomy.   Total abdominal hysterectomy with bilateral salpingo-oophorectomy.   Insert ureter stent.   Flexible sigmoidoscopy.   Ileostomy takedown.     Family History:   Mother: Positive for CAD, heart disease, hypertension, cerebrovascular disease, hyperlipidemia.   Father: Positive for alcohol/drug abuse, Alzheimer disease, dementia, hypertension, hyperlipidemia.   Sister: Positive for CAD, hypertension, and colorectal cancer.   Sister: Positive for hypertension and hyperlipidemia.   Sister: Positive for diabetes, lung cancer, asthma, and heart disease.   Brother: Positive for Parkinsonism and substance  abuse.   Brother: Positive for hypertension, diverticulitis, and prostate cancer.   Daughter: Positive for breast cancer.        Social History:   She is a former smoker; quit in 2011. She has a history of alcohol use but stopped drinking in 1986.      Physical Exam:     /72 (BP Location: Right arm, Patient Position: Sitting, Cuff Size: Adult Regular)   Pulse 54   Wt 76.2 kg (168 lb)   SpO2 97%   BMI 27.12 kg/m      Constitutional: NAD, very pleasant, sister Nghia present   Eyes: Normal EOM, conjunctiva, sclera  Chest: CTAB  CV: no M/R/G, RRR  Abdomen: soft, NT  MSK: no active synovitis or joint tenderness. Able to get out of chair and ambulate with minimal help from walker.  Skin: No rash  Neuro: grossly non-focal  Psych: Normal affect.    Images:  CT Chest w/o contrast (7/25/18):  Findings remain most consistent with small airway disease  and/or nonclassifiable interstitial lung disease and are not  significantly changed from the March 2017 comparison.    Per radiology.    Laboratory:       Latest Ref Rng & Units 6/2/2023     6:11 AM 7/16/2023     5:23 PM 7/16/2023     5:50 PM   RHEUM RESULTS   Creatinine 0.51 - 0.95 mg/dL  0.86     CRP Inflammation <5.00 mg/L  68.40     GFR Estimate >60 mL/min/1.73m2  67     Hematocrit 35.0 - 47.0 %  36.9     Hemoglobin 11.7 - 15.7 g/dL 10.6  11.2     WBC 4.0 - 11.0 10e3/uL  9.3     RBC Count 3.80 - 5.20 10e6/uL  3.93     RDW 10.0 - 15.0 %  14.1     MCHC 31.5 - 36.5 g/dL  30.4     MCV 78 - 100 fL  94     Platelet Count 150 - 450 10e3/uL  211     Sed Rate 0 - 30 mm/hr   27        Rheumatoid Factor   Date Value Ref Range Status   07/24/2015 <20 <20 IU/mL Final   ,  ,   Cyclic Cit Pept IgG/IgA   Date Value Ref Range Status   07/24/2015 <20  Interpretation:  Negative   <20 UNITS Final   ,  ,   Scleroderma Antibody Scl-70 CECILIA IgG   Date Value Ref Range Status   07/24/2015  0.0 - 0.9 AI Final    <0.2  Negative   Antibody index (AI) values reflect qualitative changes in  antibody   concentration that cannot be directly associated with clinical condition or   disease state.       SSA (Ro) (CECILIA) Antibody, IgG   Date Value Ref Range Status   07/24/2015 1.6 (H) 0.0 - 0.9 AI Final     Comment:     Positive   Antibody index (AI) values reflect qualitative changes in antibody   concentration that cannot be directly associated with clinical condition or   disease state.       SSB (La) (CECILIA) Antibody, IgG   Date Value Ref Range Status   07/24/2015  0.0 - 0.9 AI Final    <0.2  Negative   Antibody index (AI) values reflect qualitative changes in antibody   concentration that cannot be directly associated with clinical condition or   disease state.       ,  ,   RG Screen by EIA   Date Value Ref Range Status   07/24/2015 <1.0  Interpretation:  Negative   <1.0 Final   ,  ,  ,  ,  ,  ,  ,  ,  ,  ,  ,  ,  ,  ,  ,  ,  ,  ,   Neutrophil Cytoplasmic IgG Antibody   Date Value Ref Range Status   07/24/2015   Final    <1:20  Reference range: <1:20  (Note)  The ANCA IFA is <1:20; therefore, no further testing will  be performed.  INTERPRETIVE INFORMATION: Anti-Neutrophil Cyto Ab, IgG  Neutrophil Cytoplasmic Antibodies (C-ANCA = granular  cytoplasmic staining, P-ANCA = perinuclear staining) are  found in the serum of over 90 percent of patients with  certain necrotizing systemic vasculitides, and usually in  less than 5 percent of patients with collagen vascular  disease or arthritis.  Performed by [facility],  [address] [place] [id number] [phone]  [email or web address], Burke Mckeon MD, Lab. Director       ,  ,  ,  ,  ,  ,  ,   IGG   Date Value Ref Range Status   07/24/2015 1320 695 - 1620 mg/dL Final   ,  ,  ,  ,  ,   Scleroderma Antibody Scl-70 CECILIA IgG   Date Value Ref Range Status   07/24/2015  0.0 - 0.9 AI Final    <0.2  Negative   Antibody index (AI) values reflect qualitative changes in antibody   concentration that cannot be directly associated with clinical condition or   disease  state.          CBC RESULTS: 6/4/2021   Lab Test 06/04/21  1422   WBC 8.6   RBC 4.46   HGB 13.4   HCT 42.1   MCV 94   MCH 30.0   MCHC 31.8   RDW 15.5*        Last Comprehensive Metabolic Panel:  Sodium   Date Value Ref Range Status   07/16/2023 136 136 - 145 mmol/L Final   06/04/2021 137 133 - 144 mmol/L Final     Potassium   Date Value Ref Range Status   07/16/2023 4.2 3.4 - 5.3 mmol/L Final   08/23/2022 4.1 3.4 - 5.3 mmol/L Final   06/04/2021 4.0 3.4 - 5.3 mmol/L Final     Chloride   Date Value Ref Range Status   07/16/2023 99 98 - 107 mmol/L Final   08/23/2022 107 94 - 109 mmol/L Final   06/04/2021 106 94 - 109 mmol/L Final     Carbon Dioxide   Date Value Ref Range Status   06/04/2021 25 20 - 32 mmol/L Final     Carbon Dioxide (CO2)   Date Value Ref Range Status   07/16/2023 25 22 - 29 mmol/L Final   08/23/2022 22 20 - 32 mmol/L Final     Anion Gap   Date Value Ref Range Status   07/16/2023 12 7 - 15 mmol/L Final   08/23/2022 8 3 - 14 mmol/L Final   06/04/2021 6 3 - 14 mmol/L Final     Glucose   Date Value Ref Range Status   07/16/2023 262 (H) 70 - 99 mg/dL Final   08/23/2022 198 (H) 70 - 99 mg/dL Final   06/04/2021 142 (H) 70 - 99 mg/dL Final     GLUCOSE BY METER POCT   Date Value Ref Range Status   06/05/2023 259 (H) 70 - 99 mg/dL Final     Urea Nitrogen   Date Value Ref Range Status   07/16/2023 13.5 8.0 - 23.0 mg/dL Final   08/23/2022 17 7 - 30 mg/dL Final   06/04/2021 14 7 - 30 mg/dL Final     Creatinine   Date Value Ref Range Status   07/16/2023 0.86 0.51 - 0.95 mg/dL Final   06/04/2021 1.11 (H) 0.52 - 1.04 mg/dL Final     GFR Estimate   Date Value Ref Range Status   07/16/2023 67 >60 mL/min/1.73m2 Final   06/04/2021 47 (L) >60 mL/min/[1.73_m2] Final     Comment:     Non  GFR Calc  Starting 12/18/2018, serum creatinine based estimated GFR (eGFR) will be   calculated using the Chronic Kidney Disease Epidemiology Collaboration   (CKD-EPI) equation.       Calcium   Date Value Ref Range  Status   07/16/2023 9.2 8.8 - 10.2 mg/dL Final   06/04/2021 8.8 8.5 - 10.1 mg/dL Final       ESR 6/4/2021: 10.0    CRP 6/4/2021: 3.0    Uric acid 6/4/2021: 6.2    Component      Latest Ref Rng & Units 2/11/2022   WBC      4.0 - 11.0 10e3/uL 11.5 (H)   RBC Count      3.80 - 5.20 10e6/uL 3.47 (L)   Hemoglobin      11.7 - 15.7 g/dL 9.1 (L)   Hematocrit      35.0 - 47.0 % 30.2 (L)   MCV      78 - 100 fL 87   MCH      26.5 - 33.0 pg 26.2 (L)   MCHC      31.5 - 36.5 g/dL 30.1 (L)   RDW      10.0 - 15.0 % 15.1 (H)   Platelet Count      150 - 450 10e3/uL 283   % Neutrophils      % 76   % Lymphocytes      % 9   % Monocytes      % 11   % Eosinophils      % 2   % Basophils      % 1   % Immature Granulocytes      % 1   NRBCs per 100 WBC      <1 /100 0   Absolute Neutrophils      1.6 - 8.3 10e3/uL 8.9 (H)   Absolute Lymphocytes      0.8 - 5.3 10e3/uL 1.0   Absolute Monocytes      0.0 - 1.3 10e3/uL 1.2   Absolute Eosinophils      0.0 - 0.7 10e3/uL 0.2   Absolute Basophils      0.0 - 0.2 10e3/uL 0.1   Absolute Immature Granulocytes      <=0.4 10e3/uL 0.1   Absolute NRBCs      10e3/uL 0.0   Albumin Fraction      3.7 - 5.1 g/dL 3.9   Alpha 1 Fraction      0.2 - 0.4 g/dL 0.4   Alpha 2 Fraction      0.5 - 0.9 g/dL 0.8   Beta Fraction      0.6 - 1.0 g/dL 1.0   Gamma Fraction      0.7 - 1.6 g/dL 0.8   Monoclonal Peak      <=0.0 g/dL 0.0   ELP Interpretation:       Essentially normal electrophoretic pattern. No obvious monoclonal proteins seen. Pathologic significance requires clinical correlation. Meghan Brothers M.D., Ph.D.   Iron      35 - 180 ug/dL 17 (L)   Iron Binding Cap      240 - 430 ug/dL 354   Iron Saturation Index      15 - 46 % 5 (L)   Creatinine      0.52 - 1.04 mg/dL 1.00   GFR Estimate      >60 mL/min/1.73m2 56 (L)   % Retic      0.5 - 2.0 % 2.7 (H)   Absolute Retic      0.025 - 0.095 10e6/uL 0.090   ALT      0 - 50 U/L 17   Albumin      3.4 - 5.0 g/dL 3.3 (L)   AST      0 - 45 U/L 15   Sed Rate      0 - 30 mm/hr 36 (H)   CRP  Inflammation      0.0 - 8.0 mg/L 12.5 (H)   Uric Acid      2.6 - 6.0 mg/dL 6.1 (H)   Immunofixation ELP       No monoclonal protein seen on immunofixation. Pathologic significance requires clinical correlation.  Meghan Brothers M.D., Ph.D.   Immunofix ELP Urine       No monoclonal protein seen on immunofixation. Pathologic significance requires clinical correlation.  Meghan Brothers M.D., Ph.D.   Total Protein Serum for ELP      6.8 - 8.8 g/dL 6.9   N-Terminal Pro Bnp      0 - 450 pg/mL 239   Haptoglobin      32 - 197 mg/dL 149   Vitamin B12      193 - 986 pg/mL 162 (L)   Ferritin      8 - 252 ng/mL 14

## 2023-07-21 NOTE — LETTER
2023       RE: Betty Tee  [address]  [facility] [address]     Dear Colleague,    Thank you for referring your patient, Betty Tee, to the [facility] RHEUMATOLOGY [facility]INIC [place] at [facility]. Please see a copy of my visit note below.    Rheumatology Clinic In Person Follow up Visit Note  Zulma Lopez MD  DOS: 2023  Date of last visit: 2023    Name: Betty Tee  MRN: [id number]  Age: 83 year old  : 1940  Reason for visit: Follow up visit for PMR, presumed gout, primary Sjogren's syndrome    # Sjogren's syndrome since  with borderline +RG, +SSA, and lip biopsy focus score of 1. Ongoing dry mouth on evoxac qod  # Follicular bronchiolitis, prior mild ILD   # Osteopenia on DEXA 2019 with T score=-2.1 with decline in bone density on fosamax  # Chronic prednisone use  # DM  # A fib  # Severe R hip OA  # PMR stable on prednisone 5 mg every day  #Presumed gout flare, recovered  #ESOB      Assessment and Plan:    New Dx of PMR. Severe R hip pain is due to severe OA based on 2020 hip MRI. She prefered to avoid hip arthroplasty in the past but it is quite bothersome and would like to see ortho. Her inflammation markers improved on prednisone, PMR responded to prednisone, now is 5 mg qd.     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 Completed pulmonary rehab in 2019 where she was able to exercise without oxygen. GFR stable.    On HCQ since 2019 with significant improvement of joint pain. No retinal toxicity on eye exam done 2021. Tolerates HCQ well.     Sister Betty recovered from flare of presumed gout (or pseudogout given previous nl SUA) over L foot. She responded to high dose prednisone (40-30-20-10 mg every day each for 3 days) in the past, now is on 5 mg every day.  Given her DM,  osteopenia, highly recommend to start using anakinra prn for gout flares, no flares and has not required it yet.     She preferred in the past not to start daily urate-lowering therapy and her uric acid has been ~6 so allopurinol deferred.     7/8/2022: Sister Betty's major complaint at last visit was ESOB which is improved after Tx of B12 deficiency, iron deficiency due to GIB. 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 Her major complaint today is severe R hip pain due to OA which eventually requires R hip replacement, but she needs to be medically clear for surgery. Discussed pain mx and advised follow up with ortho.      1/13/2023: Stable, no active gout today. B12/iron deficiency anemia has improved. On allopurinol 50 mg po every day.    Today 7/21/2023: On increased allopurinol 100 mg every day since 1/2023, severe polyarticular gout flare last week requiring ER visit and prednisone 40-30-20-10 mg every day, each x 3 days then 5 mg a day, as anakinra daily inj was not enough to control it.    Recovered from gout flare, discussed ongoing m/o gout.    Plan:    Prednisone 5 mg a day    Norco as needed for pain      Cevimeline for dry mouth could be taken 3 times a day (she takes it every other day as does not like to take so many pills)    Stay on fosamax weekly    Yearly eye exam on HCQ    Cut back on plaquenil to 1 tab a day    For gout flares: take anakinra daily shots x 5-7 days, if not enough, take the prednisone as 40-30-20-10 mg a day, each course x 3 days then go back to 5 mg a day    Return in 6 months (in person)    Labs today    Allopurinol 100 mg a day    Return in person in 6 months      No orders of the defined types were placed in this encounter.      Zulma Lopez MD         HPI:   Betty Tee is a 81 year old WF with a history of primary Sjogren's syndrome, PMR, OA who presents for follow-up. She was diagnosed with Sjogren's syndrome in 2015 with borderline +RG,  +SSA, and lip biopsy focus score of 1. Associated ILD and joint pain are prednisone-responsive which support the diagnosis.     10/1/2021: No gout flares since last visit so has not needed anakinra. She continues to have R hip pain related to severe OA but does not want to pursue surgery. She recently started pool therapy and began taking CBD oil yesterday. Is hoping to pursue medical marijuana in the future as she does not want to be dependent on norco. Aside from hip pain has otherwise been doing well.        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 She is on prednisone 5 mg a day, she was given prednisone taper recently for possible L foot gout flare which helped. Did not flare again to need using anakinra shots. She has severe R hip pain. Saw ortho, had R hip MRI on 9/5/2020 which showed severe OA, R hip arthroplasty was recommended. She would prefer to delay R hip arthroplasty, had R hip inj on 2/24, NSAIDs are not allowed with CKD. Norco helps but she does not like to be dependent on it, takes it rarely. No L foot pain today. Her hip/leg pain/back pain is getting better, going to PT, doing exercises at home, last time elbow massage caused pain. Epidural shot helped. Has dry mouth. SOB is stable at baseline.  Last 3 wk has been hard, her doctor took her off gabapentin, sweat/chills and loss of appetite. Reason was being on other meds. Now off gabapentin. Had several gushing bowel explosion, could not make it to the bathroom. Random, unpredictable. No triggers. Has had this problem for years.     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    No gout flares, has not required anakinra, it is in the fridge.    Has breathing issue, ESOB is worse. O2 level is good. Saw PCP, was recomemnded to do follow up with cardiology.    Getting R hip steroid inj, last one was for bursitis. Still hurts crissy today. Saw pain mx yesterday. Got minimal exercises to help  moving.    7/8/2022:    Sister Betty presents for follow up. At last visit,w as found to be anemic which explained her SOB. She had iron deficiency and B12 deficiency. Was found to have GIB. Had multiple GI visits, EGD, was treated with cauterization x 3  for GAVE. This AM, had brown stool not black.    SOB is improved.    No gout flares since last visit.    Her major complaint is severe R hip pain, not responding to current pain mx, considers hip arthroplasty, but was told to wait till anemia gets fixed, also has to figure out her heart condition.      1/13/2023:     No gout flare today  Anakinra prn helps with flares  SOB is better  Hip OA pain is the same, considers arthroplasty.      Today 7/21/2023:    Reviewed chart, labs, recent events.    -s/p R hip arthroplasty, recovered well  -Severe polyarticular gout flare last week requiring ER visit and prednisone 40-30-20-10 mg every day, each x 3 days then 5 mg a day, as anakinra qd inj was not enough to control it.  -better now    Review of Systems:   A comprehensive ROS was done. Positives are per HPI.      Active Medications:     Outpatient Medications Prior to Visit   Medication Sig Dispense Refill    ACCU-CHEK SHANNON PLUS test strip USE TO TEST BLOOD SUGARS 3 TIMES DAILY 300 strip 5    acetaminophen (TYLENOL) 500 MG tablet Take 2 tablets (1,000 mg) by mouth every 8 hours as needed (max 6 tablets/24 hours, 2 tablets/dose) 60 tablet 0    acyclovir (ZOVIRAX) 400 MG tablet Take 1 tablet (400 mg) by mouth every 8 hours For a couple days 15 tablet 2    acyclovir (ZOVIRAX) 5 % external ointment Apply topically as needed (6 times day PRN for outbreaks) As needed for outbreaks 15 g 3    albuterol (PROAIR HFA/PROVENTIL HFA/VENTOLIN HFA) 108 (90 Base) MCG/ACT inhaler Inhale 2 puffs into the lungs every 6 hours as needed for wheezing or shortness of breath      alendronate (FOSAMAX) 70 MG tablet Take 1 tablet (70 mg) by mouth every 7 days 12 tablet 1    allopurinol  (ZYLOPRIM) 100 MG tablet Take 1 tablet (100 mg) by mouth daily 90 tablet 0    anakinra (KINERET) 100 MG/0.67ML SOSY injection 100 mg every day x 3 days as needed for flare ups 6.7 mL 3    aspirin (ASA) 81 MG EC tablet Take 1 tablet (81 mg) by mouth 2 times daily 60 tablet 0    atorvastatin (LIPITOR) 10 MG tablet TAKE 1/2 TABLET BY MOUTH EVERY DAY 45 tablet 3    azithromycin (ZITHROMAX) 250 MG tablet TAKE 1 TABLET BY MOUTH EVERY DAY 30 tablet 5    BD PEN NEEDLE JANE 2ND GEN 32G X 4 MM miscellaneous USE TO TEST 4 TIMES A  each 1    blood glucose (NO BRAND SPECIFIED) lancets standard Use to test blood sugar 3 times daily or as directed 100 lancet 3    BREO ELLIPTA 100-25 MCG/ACT inhaler TAKE 1 PUFF BY MOUTH EVERY DAY 1 each 11    cevimeline (EVOXAC) 30 MG capsule Take 1 capsule (30 mg) by mouth 3 times daily as needed      Cholecalciferol (VITAMIN D3) 50 MCG (2000 UT) CAPS TAKE 100 MCG BY MOUTH DAILY (TAKE 2 TABLET (50 MCG) BY MOUTH DAILY - ORAL) 180 capsule 0    COMPOUNDED NON-CONTROLLED SUBSTANCE (CMPD RX) - PHARMACY TO MIX COMPOUNDED MEDICATION Estriol 1 mg/g Apply small amount to finger and apply to inside vagina daily for 2 weeks then twice weekly Route: vaginally Dispense 30 grams 11 refills 30 g 11    cyanocobalamin (VITAMIN B-12) 1000 MCG tablet Take 1 tablet (1,000 mcg) by mouth three times a week      escitalopram (LEXAPRO) 20 MG tablet TAKE 1 TABLET BY MOUTH EVERY DAY 90 tablet 1    ferrous gluconate (FERGON) 324 (38 Fe) MG tablet Take 1 tablet (324 mg) by mouth three times a week      fluticasone (FLONASE) 50 MCG/ACT nasal spray SPRAY 1-2 SPRAYS INTO BOTH NOSTRILS DAILY AS NEEDED FOR ALLERGIES 16 mL 11    HYDROmorphone (DILAUDID) 2 MG tablet Take 1 tablet (2 mg) by mouth every 4 hours as needed for moderate pain 26 tablet 0    hydroxychloroquine (PLAQUENIL) 200 MG tablet Take 2 tablets (400 mg) by mouth daily Get annual eye exams for hydroxychloroquine (Plaquenil) monitoring and fax to [phone]  180 tablet 3    hydrOXYzine (ATARAX) 25 MG tablet Take 1 tablet (25 mg) by mouth every 6 hours as needed for other (adjuvant pain) 40 tablet 0    insulin lispro (HUMALOG KWIKPEN) 100 UNIT/ML (1 unit dial) KWIKPEN Inject Subcutaneous 3 times daily (before meals) Sliding scale insulin; tests BG three times day  If BG <150, no insulin given   If -200 take 2 units  If -250 take 4 units  If -300 take 6 units  If -350 take 10 units  If BG >350 take 14 units      ketoconazole (NIZORAL) 2 % external cream Apply topically 2 times daily as needed for itching 60 g 1    KLOR-CON 20 MEQ CR tablet TAKE 2 TABLETS (40 MEQ) BY MOUTH EVERY MORNING AND 1 TABLET (20 MEQ) EVERY EVENING. 270 tablet 3    lidocaine (LIDODERM) 5 % patch APPLY PATCH TO PAINFUL AREA FOR UP TO 12 H WITHIN A 24 H PERIOD. REMOVE AFTER 12 HOURS. (Patient taking differently: Apply patch to painful area for up to 12 h within a 24 h period.  Remove after 12 hours.) 30 patch 2    loratadine (CLARITIN) 10 MG tablet TAKE 1 TABLET BY MOUTH EVERY DAY 90 tablet 3    methocarbamol (ROBAXIN) 750 MG tablet Take 1 tablet (750 mg) by mouth 3 times daily as needed for muscle spasms 90 tablet 3    metoprolol succinate ER (TOPROL XL) 50 MG 24 hr tablet Take 1 tablet (50 mg) by mouth 2 times daily 180 tablet 3    montelukast (SINGULAIR) 10 MG tablet TAKE 1 TABLET BY MOUTH EVERYDAY AT BEDTIME 90 tablet 1    OZEMPIC, 1 MG/DOSE, 4 MG/3ML pen INJECT 1 MG SUBCUTANEOUS ONCE A WEEK 9 mL 1    pantoprazole (PROTONIX) 40 MG EC tablet Take 1 tablet (40 mg) by mouth daily (replaces famotidine- should stop taking famotidine) 90 tablet 3    polyethylene glycol (MIRALAX) 17 g packet Take 17 g by mouth daily 10 packet 0    predniSONE (DELTASONE) 20 MG tablet Take 2 tabs by mouth daily x 3 days, then 1 tabs daily x 3 days, then 1/2 tab daily x 3 days 11 tablet 0    predniSONE (DELTASONE) 5 MG tablet Take 1 tablet (5 mg) by mouth daily 90 tablet 1    senna-docusate  (SENOKOT-S/PERICOLACE) 8.6-50 MG tablet Take 1 tablet by mouth 2 times daily 30 tablet 0    tiZANidine (ZANAFLEX) 2 MG tablet Take 1 tablet (2 mg) by mouth every 6 hours as needed for muscle spasms 30 tablet 0    torsemide (DEMADEX) 20 MG tablet Take 2 tablets (40 mg) by mouth every morning AND 1 tablet (20 mg) every evening. 270 tablet 3     No facility-administered medications prior to visit.     Allergies:   Allergies   Allergen Reactions    Amoxicillin-Pot Clavulanate Nausea and Vomiting    Amoxicillin-Pot Clavulanate     Codeine Nausea and Vomiting     PN: LW Reaction: HIVES    Penicillins Nausea and Vomiting     PN: LW Reaction: GI Upset    Phenobarbital Itching    Seasonal Allergies         Past Medical History:  Alcohol abuse, in remission.   Allergic rhinitis.   Antiplatelet long-term use.   Atrial fibrillation.   Cardiomegaly.   Osteopenia.   Diverticulosis.   Benign essential hypertension.   GERD.   Insomnia.   Irregular heart beat.   Lumbago.   Major depressive disorder, recurrent episode, moderate.   ANGELICA.  Osteoarthrosis.   Sjogren's syndrome.   Sleep apnea.   Tobacco use disorder.   TMJ disorder.   RLS.  Major depressive disorder.   Hypertension.   Sciatica.   Colouterine fistula.   Left ventricular hypertrophy.   Breat fibroadenoma.   DVT.   Fatty liver disease, nonalcoholic.   Rib pain.   Neck mass.   Interstitial lung disease.   Acute and chronic respiratory failure with hypoxia.   Type II diabetes mellitus.   Hyperlipidemia.   Inflammatory arthritis.      Past Surgical History:  Back surgery.   Left breast biopsy.   Appendectomy.   Exploratory laparotomy.   Cardiac surgery.   Cholecystectomy.   Left colectomy.   Total abdominal hysterectomy with bilateral salpingo-oophorectomy.   Insert ureter stent.   Flexible sigmoidoscopy.   Ileostomy takedown.     Family History:   Mother: Positive for CAD, heart disease, hypertension, cerebrovascular disease, hyperlipidemia.   Father: Positive for alcohol/drug  abuse, Alzheimer disease, dementia, hypertension, hyperlipidemia.   Sister: Positive for CAD, hypertension, and colorectal cancer.   Sister: Positive for hypertension and hyperlipidemia.   Sister: Positive for diabetes, lung cancer, asthma, and heart disease.   Brother: Positive for Parkinsonism and substance abuse.   Brother: Positive for hypertension, diverticulitis, and prostate cancer.   Daughter: Positive for breast cancer.        Social History:   She is a former smoker; quit in 2011. She has a history of alcohol use but stopped drinking in 1986.      Physical Exam:     /72 (BP Location: Right arm, Patient Position: Sitting, Cuff Size: Adult Regular)   Pulse 54   Wt 76.2 kg (168 lb)   SpO2 97%   BMI 27.12 kg/m      Constitutional: NAD, very pleasant, sister Nghia present   Eyes: Normal EOM, conjunctiva, sclera  Chest: CTAB  CV: no M/R/G, RRR  Abdomen: soft, NT  MSK: no active synovitis or joint tenderness. Able to get out of chair and ambulate with minimal help from walker.  Skin: No rash  Neuro: grossly non-focal  Psych: Normal affect.    Images:  CT Chest w/o contrast (7/25/18):  Findings remain most consistent with small airway disease  and/or nonclassifiable interstitial lung disease and are not  significantly changed from the March 2017 comparison.    Per radiology.    Laboratory:       Latest Ref Rng & Units 6/2/2023     6:11 AM 7/16/2023     5:23 PM 7/16/2023     5:50 PM   RHEUM RESULTS   Creatinine 0.51 - 0.95 mg/dL  0.86     CRP Inflammation <5.00 mg/L  68.40     GFR Estimate >60 mL/min/1.73m2  67     Hematocrit 35.0 - 47.0 %  36.9     Hemoglobin 11.7 - 15.7 g/dL 10.6  11.2     WBC 4.0 - 11.0 10e3/uL  9.3     RBC Count 3.80 - 5.20 10e6/uL  3.93     RDW 10.0 - 15.0 %  14.1     MCHC 31.5 - 36.5 g/dL  30.4     MCV 78 - 100 fL  94     Platelet Count 150 - 450 10e3/uL  211     Sed Rate 0 - 30 mm/hr   27        Rheumatoid Factor   Date Value Ref Range Status   07/24/2015 <20 <20 IU/mL Final   ,  ,    Cyclic Cit Pept IgG/IgA   Date Value Ref Range Status   07/24/2015 <20  Interpretation:  Negative   <20 UNITS Final   ,  ,   Scleroderma Antibody Scl-70 CECILIA IgG   Date Value Ref Range Status   07/24/2015  0.0 - 0.9 AI Final    <0.2  Negative   Antibody index (AI) values reflect qualitative changes in antibody   concentration that cannot be directly associated with clinical condition or   disease state.       SSA (Ro) (CECILIA) Antibody, IgG   Date Value Ref Range Status   07/24/2015 1.6 (H) 0.0 - 0.9 AI Final     Comment:     Positive   Antibody index (AI) values reflect qualitative changes in antibody   concentration that cannot be directly associated with clinical condition or   disease state.       SSB (La) (CECILIA) Antibody, IgG   Date Value Ref Range Status   07/24/2015  0.0 - 0.9 AI Final    <0.2  Negative   Antibody index (AI) values reflect qualitative changes in antibody   concentration that cannot be directly associated with clinical condition or   disease state.       ,  ,   RG Screen by EIA   Date Value Ref Range Status   07/24/2015 <1.0  Interpretation:  Negative   <1.0 Final   ,  ,  ,  ,  ,  ,  ,  ,  ,  ,  ,  ,  ,  ,  ,  ,  ,  ,   Neutrophil Cytoplasmic IgG Antibody   Date Value Ref Range Status   07/24/2015   Final    <1:20  Reference range: <1:20  (Note)  The ANCA IFA is <1:20; therefore, no further testing will  be performed.  INTERPRETIVE INFORMATION: Anti-Neutrophil Cyto Ab, IgG  Neutrophil Cytoplasmic Antibodies (C-ANCA = granular  cytoplasmic staining, P-ANCA = perinuclear staining) are  found in the serum of over 90 percent of patients with  certain necrotizing systemic vasculitides, and usually in  less than 5 percent of patients with collagen vascular  disease or arthritis.  Performed by [facility]  [address] [place] [id number] [phone]  [email or web address], Burke Mckeon MD, Lab. Director       ,  ,  ,  ,  ,  ,  ,   IGG   Date Value Ref Range Status   07/24/2015 2687 617 - 9453  mg/dL Final   ,  ,  ,  ,  ,   Scleroderma Antibody Scl-70 CECILIA IgG   Date Value Ref Range Status   07/24/2015  0.0 - 0.9 AI Final    <0.2  Negative   Antibody index (AI) values reflect qualitative changes in antibody   concentration that cannot be directly associated with clinical condition or   disease state.          CBC RESULTS: 6/4/2021   Lab Test 06/04/21  1422   WBC 8.6   RBC 4.46   HGB 13.4   HCT 42.1   MCV 94   MCH 30.0   MCHC 31.8   RDW 15.5*        Last Comprehensive Metabolic Panel:  Sodium   Date Value Ref Range Status   07/16/2023 136 136 - 145 mmol/L Final   06/04/2021 137 133 - 144 mmol/L Final     Potassium   Date Value Ref Range Status   07/16/2023 4.2 3.4 - 5.3 mmol/L Final   08/23/2022 4.1 3.4 - 5.3 mmol/L Final   06/04/2021 4.0 3.4 - 5.3 mmol/L Final     Chloride   Date Value Ref Range Status   07/16/2023 99 98 - 107 mmol/L Final   08/23/2022 107 94 - 109 mmol/L Final   06/04/2021 106 94 - 109 mmol/L Final     Carbon Dioxide   Date Value Ref Range Status   06/04/2021 25 20 - 32 mmol/L Final     Carbon Dioxide (CO2)   Date Value Ref Range Status   07/16/2023 25 22 - 29 mmol/L Final   08/23/2022 22 20 - 32 mmol/L Final     Anion Gap   Date Value Ref Range Status   07/16/2023 12 7 - 15 mmol/L Final   08/23/2022 8 3 - 14 mmol/L Final   06/04/2021 6 3 - 14 mmol/L Final     Glucose   Date Value Ref Range Status   07/16/2023 262 (H) 70 - 99 mg/dL Final   08/23/2022 198 (H) 70 - 99 mg/dL Final   06/04/2021 142 (H) 70 - 99 mg/dL Final     GLUCOSE BY METER POCT   Date Value Ref Range Status   06/05/2023 259 (H) 70 - 99 mg/dL Final     Urea Nitrogen   Date Value Ref Range Status   07/16/2023 13.5 8.0 - 23.0 mg/dL Final   08/23/2022 17 7 - 30 mg/dL Final   06/04/2021 14 7 - 30 mg/dL Final     Creatinine   Date Value Ref Range Status   07/16/2023 0.86 0.51 - 0.95 mg/dL Final   06/04/2021 1.11 (H) 0.52 - 1.04 mg/dL Final     GFR Estimate   Date Value Ref Range Status   07/16/2023 67 >60 mL/min/1.73m2  Final   06/04/2021 47 (L) >60 mL/min/[1.73_m2] Final     Comment:     Non  GFR Calc  Starting 12/18/2018, serum creatinine based estimated GFR (eGFR) will be   calculated using the Chronic Kidney Disease Epidemiology Collaboration   (CKD-EPI) equation.       Calcium   Date Value Ref Range Status   07/16/2023 9.2 8.8 - 10.2 mg/dL Final   06/04/2021 8.8 8.5 - 10.1 mg/dL Final       ESR 6/4/2021: 10.0    CRP 6/4/2021: 3.0    Uric acid 6/4/2021: 6.2    Component      Latest Ref Rng & Units 2/11/2022   WBC      4.0 - 11.0 10e3/uL 11.5 (H)   RBC Count      3.80 - 5.20 10e6/uL 3.47 (L)   Hemoglobin      11.7 - 15.7 g/dL 9.1 (L)   Hematocrit      35.0 - 47.0 % 30.2 (L)   MCV      78 - 100 fL 87   MCH      26.5 - 33.0 pg 26.2 (L)   MCHC      31.5 - 36.5 g/dL 30.1 (L)   RDW      10.0 - 15.0 % 15.1 (H)   Platelet Count      150 - 450 10e3/uL 283   % Neutrophils      % 76   % Lymphocytes      % 9   % Monocytes      % 11   % Eosinophils      % 2   % Basophils      % 1   % Immature Granulocytes      % 1   NRBCs per 100 WBC      <1 /100 0   Absolute Neutrophils      1.6 - 8.3 10e3/uL 8.9 (H)   Absolute Lymphocytes      0.8 - 5.3 10e3/uL 1.0   Absolute Monocytes      0.0 - 1.3 10e3/uL 1.2   Absolute Eosinophils      0.0 - 0.7 10e3/uL 0.2   Absolute Basophils      0.0 - 0.2 10e3/uL 0.1   Absolute Immature Granulocytes      <=0.4 10e3/uL 0.1   Absolute NRBCs      10e3/uL 0.0   Albumin Fraction      3.7 - 5.1 g/dL 3.9   Alpha 1 Fraction      0.2 - 0.4 g/dL 0.4   Alpha 2 Fraction      0.5 - 0.9 g/dL 0.8   Beta Fraction      0.6 - 1.0 g/dL 1.0   Gamma Fraction      0.7 - 1.6 g/dL 0.8   Monoclonal Peak      <=0.0 g/dL 0.0   ELP Interpretation:       Essentially normal electrophoretic pattern. No obvious monoclonal proteins seen. Pathologic significance requires clinical correlation. Meghan Brothers M.D., Ph.D.   Iron      35 - 180 ug/dL 17 (L)   Iron Binding Cap      240 - 430 ug/dL 354   Iron Saturation Index      15 - 46 %  5 (L)   Creatinine      0.52 - 1.04 mg/dL 1.00   GFR Estimate      >60 mL/min/1.73m2 56 (L)   % Retic      0.5 - 2.0 % 2.7 (H)   Absolute Retic      0.025 - 0.095 10e6/uL 0.090   ALT      0 - 50 U/L 17   Albumin      3.4 - 5.0 g/dL 3.3 (L)   AST      0 - 45 U/L 15   Sed Rate      0 - 30 mm/hr 36 (H)   CRP Inflammation      0.0 - 8.0 mg/L 12.5 (H)   Uric Acid      2.6 - 6.0 mg/dL 6.1 (H)   Immunofixation ELP       No monoclonal protein seen on immunofixation. Pathologic significance requires clinical correlation.  Meghan Brothers M.D., Ph.D.   Immunofix ELP Urine       No monoclonal protein seen on immunofixation. Pathologic significance requires clinical correlation.  Meghan Brothers M.D., Ph.D.   Total Protein Serum for ELP      6.8 - 8.8 g/dL 6.9   N-Terminal Pro Bnp      0 - 450 pg/mL 239   Haptoglobin      32 - 197 mg/dL 149   Vitamin B12      193 - 986 pg/mL 162 (L)   Ferritin      8 - 252 ng/mL 14           Again, thank you for allowing me to participate in the care of your patient.      Sincerely,    Zulma Lopez MD

## 2023-07-21 NOTE — PATIENT INSTRUCTIONS
Cut back on plaquenil to 1 tab a day    For gout flares: take anakinra daily shots x 5-7 days, if not enough, take the prednisone as 40-30-20-10 mg a day, each course x 3 days then go back to 5 mg a day    Return in 6 months (in person)

## 2023-07-28 DIAGNOSIS — M10.9 GOUT, UNSPECIFIED CAUSE, UNSPECIFIED CHRONICITY, UNSPECIFIED SITE: ICD-10-CM

## 2023-07-28 DIAGNOSIS — M10.9 ACUTE GOUT OF FOOT, UNSPECIFIED CAUSE, UNSPECIFIED LATERALITY: Primary | ICD-10-CM

## 2023-07-28 RX ORDER — PREDNISONE 5 MG/1
TABLET ORAL
Qty: 80 TABLET | Refills: 0 | Status: CANCELLED | OUTPATIENT
Start: 2023-07-28

## 2023-07-28 NOTE — TELEPHONE ENCOUNTER
Health Call Center    Phone Message    May a detailed message be left on voicemail: yes     Reason for Call: Medication Refill Request    Has the patient contacted the pharmacy for the refill? Yes   Name of medication being requested: Prednisone  Provider who prescribed the medication: Dr Lopez  Pharmacy: [facility]/pharm[id number] [place] - [address]   Date medication is needed: When pt saw Dr Lopez on 7/21 pt states she was told to have prednisone on hand incase of a gout flare up.     Action Taken: Message routed to:  [facility] ([facility] Med Refills Team     Travel Screening: Not Applicable

## 2023-07-30 RX ORDER — PREDNISONE 10 MG/1
TABLET ORAL
Qty: 30 TABLET | Refills: 3 | Status: SHIPPED | OUTPATIENT
Start: 2023-07-30

## 2023-07-31 NOTE — TELEPHONE ENCOUNTER
Message left for patient that prednisone prescription was sent in for her.    Stefania Graves, BSN, RN  RN Care Coordinator Rheumatology

## 2023-08-01 ENCOUNTER — OFFICE VISIT (OUTPATIENT)
Dept: FAMILY MEDICINE | Facility: CLINIC | Age: 83
End: 2023-08-01
Payer: MEDICARE

## 2023-08-01 VITALS
RESPIRATION RATE: 16 BRPM | BODY MASS INDEX: 27.56 KG/M2 | HEART RATE: 56 BPM | OXYGEN SATURATION: 97 % | TEMPERATURE: 97.4 F | WEIGHT: 171.5 LBS | DIASTOLIC BLOOD PRESSURE: 84 MMHG | HEIGHT: 66 IN | SYSTOLIC BLOOD PRESSURE: 143 MMHG

## 2023-08-01 DIAGNOSIS — Z79.899 HIGH RISK MEDICATION USE: Primary | ICD-10-CM

## 2023-08-01 DIAGNOSIS — E11.65 TYPE 2 DIABETES MELLITUS WITH HYPERGLYCEMIA, WITH LONG-TERM CURRENT USE OF INSULIN (H): ICD-10-CM

## 2023-08-01 DIAGNOSIS — K31.819 GAVE (GASTRIC ANTRAL VASCULAR ECTASIA): ICD-10-CM

## 2023-08-01 DIAGNOSIS — Z96.641 STATUS POST RIGHT HIP REPLACEMENT: Primary | ICD-10-CM

## 2023-08-01 DIAGNOSIS — N18.31 STAGE 3A CHRONIC KIDNEY DISEASE (H): ICD-10-CM

## 2023-08-01 DIAGNOSIS — E78.5 HYPERLIPIDEMIA LDL GOAL <100: ICD-10-CM

## 2023-08-01 DIAGNOSIS — Z79.4 TYPE 2 DIABETES MELLITUS WITH HYPERGLYCEMIA, WITH LONG-TERM CURRENT USE OF INSULIN (H): ICD-10-CM

## 2023-08-01 DIAGNOSIS — I10 ESSENTIAL HYPERTENSION WITH GOAL BLOOD PRESSURE LESS THAN 140/90: ICD-10-CM

## 2023-08-01 DIAGNOSIS — M10.9 GOUT, UNSPECIFIED CAUSE, UNSPECIFIED CHRONICITY, UNSPECIFIED SITE: ICD-10-CM

## 2023-08-01 DIAGNOSIS — G47.00 INSOMNIA, UNSPECIFIED TYPE: ICD-10-CM

## 2023-08-01 DIAGNOSIS — D50.9 IRON DEFICIENCY ANEMIA, UNSPECIFIED IRON DEFICIENCY ANEMIA TYPE: ICD-10-CM

## 2023-08-01 DIAGNOSIS — N39.41 URGE INCONTINENCE OF URINE: ICD-10-CM

## 2023-08-01 LAB
ERYTHROCYTE [DISTWIDTH] IN BLOOD BY AUTOMATED COUNT: 13.3 % (ref 10–15)
HCT VFR BLD AUTO: 39.2 % (ref 35–47)
HGB BLD-MCNC: 12.8 G/DL (ref 11.7–15.7)
MCH RBC QN AUTO: 29.4 PG (ref 26.5–33)
MCHC RBC AUTO-ENTMCNC: 32.7 G/DL (ref 31.5–36.5)
MCV RBC AUTO: 90 FL (ref 78–100)
PLATELET # BLD AUTO: 200 10E3/UL (ref 150–450)
RBC # BLD AUTO: 4.36 10E6/UL (ref 3.8–5.2)
WBC # BLD AUTO: 9 10E3/UL (ref 4–11)

## 2023-08-01 PROCEDURE — 85027 COMPLETE CBC AUTOMATED: CPT | Performed by: FAMILY MEDICINE

## 2023-08-01 PROCEDURE — 80053 COMPREHEN METABOLIC PANEL: CPT | Performed by: FAMILY MEDICINE

## 2023-08-01 PROCEDURE — 36415 COLL VENOUS BLD VENIPUNCTURE: CPT | Performed by: FAMILY MEDICINE

## 2023-08-01 PROCEDURE — 99215 OFFICE O/P EST HI 40 MIN: CPT | Performed by: FAMILY MEDICINE

## 2023-08-01 RX ORDER — TRAZODONE HYDROCHLORIDE 50 MG/1
50-100 TABLET, FILM COATED ORAL AT BEDTIME
COMMUNITY
Start: 2023-08-01 | End: 2023-12-04

## 2023-08-01 ASSESSMENT — PAIN SCALES - GENERAL: PAINLEVEL: NO PAIN (0)

## 2023-08-01 NOTE — Clinical Note
Follow-up appt on 10/6/23  (Fri) at 11am slot, 1hr again -- for DM II, lab follow-up, chronic cares, med check - thank you!

## 2023-08-01 NOTE — PROGRESS NOTES
Assessment & Plan     ICD-10-CM    1. Status post right hip replacement  Z96.641       2. Iron deficiency anemia, unspecified iron deficiency anemia type  D50.9 CBC with platelets     CBC with platelets     CBC with platelets      3. GAVE (gastric antral vascular ectasia)  K31.819 CBC with platelets     CBC with platelets     CBC with platelets      4. Gout, unspecified cause, unspecified chronicity, unspecified site  M10.9       5. Type 2 diabetes mellitus with hyperglycemia, with long-term current use of insulin (H)  E11.65 Comprehensive metabolic panel (BMP + Alb, Alk Phos, ALT, AST, Total. Bili, TP)    Z79.4 **Hemoglobin A1c FUTURE 3mo     Albumin Random Urine Quantitative with Creat Ratio     Comprehensive metabolic panel (BMP + Alb, Alk Phos, ALT, AST, Total. Bili, TP)      6. Stage 3a chronic kidney disease (H)  N18.31 Comprehensive metabolic panel (BMP + Alb, Alk Phos, ALT, AST, Total. Bili, TP)     Comprehensive metabolic panel (BMP + Alb, Alk Phos, ALT, AST, Total. Bili, TP)      7. Essential hypertension with goal blood pressure less than 140/90  I10 Comprehensive metabolic panel (BMP + Alb, Alk Phos, ALT, AST, Total. Bili, TP)     Comprehensive metabolic panel (BMP + Alb, Alk Phos, ALT, AST, Total. Bili, TP)      8. Hyperlipidemia LDL goal <100  E78.5 Comprehensive metabolic panel (BMP + Alb, Alk Phos, ALT, AST, Total. Bili, TP)     Comprehensive metabolic panel (BMP + Alb, Alk Phos, ALT, AST, Total. Bili, TP)      9. Urge incontinence of urine  N39.41       10. Insomnia, unspecified type  G47.00            S/p R hip replacement-   Pain is much, much better s/p hip replacement, huge relief.  Prior to surgery, pain had been migrating, so we were not certain this would be the case.  Now mostly on tylenol for pain, able to drive. Has lidocaine patch and muscle relaxant rx's from prior that she'd like to keep on her med list for now, but hopefully will not need them.  Wary of cane after using walker for so  long- needs more PT.  TCU stay helpful, with home PT afterwards.   -Orders signed here to continue PT, but she will call to set up.    Msg us if new PT order is needed.    Gout- recent severe flare, better with prednisone taper course, completed ~7/26/23, now back on prednisone 5mg/d.  Continue follow-up with Dr. Webster.  Has predniosone course to take if needed at home.    R 2nd finger concerns, maybe weakness, has been dropping things- pt unsure if ready yet, but discussed referral to Dr. Krause when she is ready.    Anemia- h/o severe anemia on anti-coagulation with GAVE, but now s/p Watchman procedure and off anticoagulants other than asa.  Hgb dropped to 10's s/p surgery, 11's at last check.  Will recheck CBC tdaoy- hgb back at 12.8.  Will have her try off her iron (currently taking 3x/wk), and recheck cbc with labs in late 9/23.      DM II/CKD/HTN/Lipids-   On ozempic, and with the prednisone course for gout, glucose levels have been higher, so needing a bit of the insulin, ~2-4 units/day.  Will hold off on A1C, but recheck in late 9/23 with appt wk later.  Lipids utd as of 3/23, so no need to fast, but will check microalb.  Recheck CMP today.  GFR high in hospital, will see where it's landing.  Eye appt tomorrow.    Insomnia- They didn't have her trazodone for sleep, so she didn't take it while at the TCU, but she has restarted since getting home, 1-2 nightly.  Working fairly well.    Urge incontinence- cont estradiol from Dr. Nassar.    Loose stools- mentioned today, but did not want to go into depth.    MDD- did not discuss today.    Cont follow-up with other specialists- not discussed in depth today.    I spent a total of 68 minutes on the day of the visit.   Time spent by me doing chart review, history and exam, documentation and further activities per the note     Blood sugar testing frequency justification:  Risk of hypoglycemia with medication(s) and on insulin based on blood sugar testing      Ilene  Kalli Tristan MD  [facility] [place] [place] Hernández is a 83 year old, presenting for the following health issues:  Diabetes        8/1/2023     1:45 PM   Additional Questions   Roomed by Gricelda       History of Present Illness       Diabetes:   She presents for follow up of diabetes.  She is checking home blood glucose three times daily.   She checks blood glucose before meals.  Blood glucose is sometimes over 200 and never under 70. She is aware of hypoglycemia symptoms including dizziness, weakness and lethargy.   She is concerned about blood sugar frequently over 200.   She is having numbness in feet, burning in feet and redness, sores, or blisters on feet.  The patient has not had a diabetic eye exam in the last 12 months.          She eats 2-3 servings of fruits and vegetables daily.She consumes 0 sweetened beverage(s) daily.She exercises with enough effort to increase her heart rate 10 to 19 minutes per day.  She exercises with enough effort to increase her heart rate 3 or less days per week.   She is taking medications regularly.       ED/UC Followup:    Facility:  [facility]  Date of visit: 7/16/23  Reason for visit:   Idiopathic chronic gout of multiple sites with tophus  Current Status: symptoms have greatly improved in hands, left knees and right big toe     Significant gout flare, multiple joints.  Dr. Lopez recommended ER to make sure she didn't have a clot or other issues.  Pt was on a tapering dose of prednisone from ~7/16/23 to ~7/26/23.  She's now back on her usual prednisone dose of 5mg/d.  She had a recent visit with Dr. Lopez, and now has a prednisone course at home to use if she has another gout flare.      S/p R hip replacement on 5/31/23, and she feels it has really improved her pain issues which is wonderful. She did have a more extended hospital stay x 5 days due to pain control issues. Then went to the TCU ([facility]) x 3  "wks.    Went through medication list carefully with Sister Nghia today.   Med updates -  Pain- mostly taking tylenol for pain (off dilaudid while there, also atarax and zanaflex- took these off med list). Now mostly on tylenol, but no dilaudid or muscle relaxant use lately, but would like to keep the lidocaine patch on her med list in case of need, along with the methocarbamol she's been on.    Insomnia- They didn't have her trazodone for sleep, so she didn't take it while there, but she has restarted since getting home, 1-2 nightly.    DM II/CKD- on ozempic, and with the prednisone course for gout, glucose levels have been higher, so needing a bit of the insulin, ~2-4 units/day.  --Will hold off on A1C, but recheck in late 9/23 with appt wk later.  Lipids utd as of 3/23, so no need to fast, but will check microalb.  Recheck CMP today.  GFR high in hospital, will see where it's landing.  Eye appt tomorrow.    Anemia- on iron 3d/wk.  Hgb went down after surgery go 10's, in 11's at last check a couple weeks ago. Will recheck today.  History of GAVE, but now off anti-coagulants other than asa.    --will recheck cbc today, if high, consider off iron??  Recheck w/ labs in 9-10/23?    Energy has been better, except for today, feels more tired.  But has been walking more, scrubbing floors, making dinner.      R hand pain-   R first MCP joint- gout hits that hand, sometimes left knee, or big toe on right foot.  In past, has been in her ankle.  Following with Dr. Lopez.  Pt now has prednisone course on hand.  Trigger finger- dropping things per her friend.  -- will refer to Dr. Krause.          Review of Systems   Constitutional, HEENT, cardiovascular, pulmonary, gi and gu systems are negative, except as otherwise noted.      Objective    BP (!) 143/84 (BP Location: Left arm, Patient Position: Sitting, Cuff Size: Adult Regular)   Pulse 56   Temp 97.4  F (36.3  C) (Temporal)   Resp 16   Ht 1.676 m (5' 6\")   Wt 77.8 kg (171 " lb 8 oz)   LMP  (LMP Unknown)   SpO2 97%   BMI 27.68 kg/m    Body mass index is 27.68 kg/m .  Physical Exam   GENERAL APPEARANCE: healthy, alert and no distress     EYES: PERRL, sclera clear     HENT: nose and mouth without ulcers or lesions     NECK: no adenopathy, no asymmetry, masses, or scars and thyroid normal to palpation     RESP: lungs clear to auscultation - no rales, rhonchi or wheezes     CV: regular rates and rhythm, normal S1 S2, no S3 or S4 and no murmur, click or rub      Abdomen: soft, nontender, no HSM or masses and bowel sounds normal     Ext: warm, dry, no edema     Psych: full range affect, normal speech and grooming, judgement and insight intact     Admission on 07/16/2023, Discharged on 07/16/2023   Component Date Value Ref Range Status    Hold Specimen 07/16/2023 [facility]   Final    Hold Specimen 07/16/2023 [facility]   Final    Hold Specimen 07/16/2023 [facility]   Final    Hold Specimen 07/16/2023 [facility]   Final    Sodium 07/16/2023 136  136 - 145 mmol/L Final    Potassium 07/16/2023 4.2  3.4 - 5.3 mmol/L Final    Chloride 07/16/2023 99  98 - 107 mmol/L Final    Carbon Dioxide (CO2) 07/16/2023 25  22 - 29 mmol/L Final    Anion Gap 07/16/2023 12  7 - 15 mmol/L Final    Urea Nitrogen 07/16/2023 13.5  8.0 - 23.0 mg/dL Final    Creatinine 07/16/2023 0.86  0.51 - 0.95 mg/dL Final    Calcium 07/16/2023 9.2  8.8 - 10.2 mg/dL Final    Glucose 07/16/2023 262 (H)  70 - 99 mg/dL Final    GFR Estimate 07/16/2023 67  >60 mL/min/1.73m2 Final    Erythrocyte Sedimentation Rate 07/16/2023 27  0 - 30 mm/hr Final    CRP Inflammation 07/16/2023 68.40 (H)  <5.00 mg/L Final    Uric Acid 07/16/2023 4.7  2.4 - 5.7 mg/dL Final    WBC Count 07/16/2023 9.3  4.0 - 11.0 10e3/uL Final    RBC Count 07/16/2023 3.93  3.80 - 5.20 10e6/uL Final    Hemoglobin 07/16/2023 11.2 (L)  11.7 - 15.7 g/dL Final    Hematocrit 07/16/2023 36.9  35.0 - 47.0 % Final    MCV 07/16/2023 94  78 - 100 fL Final    MCH 07/16/2023 28.5  26.5 - 33.0 pg Final    MCHC  07/16/2023 30.4 (L)  31.5 - 36.5 g/dL Final    RDW 07/16/2023 14.1  10.0 - 15.0 % Final    Platelet Count 07/16/2023 211  150 - 450 10e3/uL Final    % Neutrophils 07/16/2023 80  % Final    % Lymphocytes 07/16/2023 7  % Final    % Monocytes 07/16/2023 11  % Final    % Eosinophils 07/16/2023 2  % Final    % Basophils 07/16/2023 0  % Final    % Immature Granulocytes 07/16/2023 0  % Final    NRBCs per 100 WBC 07/16/2023 0  <1 /100 Final    Absolute Neutrophils 07/16/2023 7.4  1.6 - 8.3 10e3/uL Final    Absolute Lymphocytes 07/16/2023 0.7 (L)  0.8 - 5.3 10e3/uL Final    Absolute Monocytes 07/16/2023 1.0  0.0 - 1.3 10e3/uL Final    Absolute Eosinophils 07/16/2023 0.2  0.0 - 0.7 10e3/uL Final    Absolute Basophils 07/16/2023 0.0  0.0 - 0.2 10e3/uL Final    Absolute Immature Granulocytes 07/16/2023 0.0  <=0.4 10e3/uL Final    Absolute NRBCs 07/16/2023 0.0  10e3/uL Final     Results for orders placed or performed in visit on 08/01/23   CBC with platelets     Status: Normal   Result Value Ref Range    WBC Count 9.0 4.0 - 11.0 10e3/uL    RBC Count 4.36 3.80 - 5.20 10e6/uL    Hemoglobin 12.8 11.7 - 15.7 g/dL    Hematocrit 39.2 35.0 - 47.0 %    MCV 90 78 - 100 fL    MCH 29.4 26.5 - 33.0 pg    MCHC 32.7 31.5 - 36.5 g/dL    RDW 13.3 10.0 - 15.0 %    Platelet Count 200 150 - 450 10e3/uL   Comprehensive metabolic panel (BMP + Alb, Alk Phos, ALT, AST, Total. Bili, TP)     Status: Abnormal   Result Value Ref Range    Sodium 134 (L) 136 - 145 mmol/L    Potassium 4.1 3.4 - 5.3 mmol/L    Chloride 96 (L) 98 - 107 mmol/L    Carbon Dioxide (CO2) 24 22 - 29 mmol/L    Anion Gap 14 7 - 15 mmol/L    Urea Nitrogen 21.4 8.0 - 23.0 mg/dL    Creatinine 0.99 (H) 0.51 - 0.95 mg/dL    Calcium 9.6 8.8 - 10.2 mg/dL    Glucose 456 (H) 70 - 99 mg/dL    Alkaline Phosphatase 106 (H) 35 - 104 U/L    AST 18 0 - 45 U/L    ALT 12 0 - 50 U/L    Protein Total 7.1 6.4 - 8.3 g/dL    Albumin 4.0 3.5 - 5.2 g/dL    Bilirubin Total 0.3 <=1.2 mg/dL    GFR Estimate 56  (L) >60 mL/min/1.73m2

## 2023-08-02 ENCOUNTER — OFFICE VISIT (OUTPATIENT)
Dept: OPHTHALMOLOGY | Facility: CLINIC | Age: 83
End: 2023-08-02
Attending: OPHTHALMOLOGY
Payer: MEDICARE

## 2023-08-02 DIAGNOSIS — Z96.1 PSEUDOPHAKIA OF BOTH EYES: ICD-10-CM

## 2023-08-02 DIAGNOSIS — H52.03 HYPEROPIA OF BOTH EYES WITH ASTIGMATISM AND PRESBYOPIA: ICD-10-CM

## 2023-08-02 DIAGNOSIS — H52.4 HYPEROPIA OF BOTH EYES WITH ASTIGMATISM AND PRESBYOPIA: ICD-10-CM

## 2023-08-02 DIAGNOSIS — H04.123 DRY EYE SYNDROME OF BOTH EYES: ICD-10-CM

## 2023-08-02 DIAGNOSIS — H52.203 HYPEROPIA OF BOTH EYES WITH ASTIGMATISM AND PRESBYOPIA: ICD-10-CM

## 2023-08-02 DIAGNOSIS — Z79.899 LONG-TERM USE OF PLAQUENIL: Primary | ICD-10-CM

## 2023-08-02 DIAGNOSIS — Z79.899 HIGH RISK MEDICATION USE: ICD-10-CM

## 2023-08-02 DIAGNOSIS — M35.00 SJOGREN SYNDROME, UNSPECIFIED (H): ICD-10-CM

## 2023-08-02 LAB
ALBUMIN SERPL BCG-MCNC: 4 G/DL (ref 3.5–5.2)
ALP SERPL-CCNC: 106 U/L (ref 35–104)
ALT SERPL W P-5'-P-CCNC: 12 U/L (ref 0–50)
ANION GAP SERPL CALCULATED.3IONS-SCNC: 14 MMOL/L (ref 7–15)
AST SERPL W P-5'-P-CCNC: 18 U/L (ref 0–45)
BILIRUB SERPL-MCNC: 0.3 MG/DL
BUN SERPL-MCNC: 21.4 MG/DL (ref 8–23)
CALCIUM SERPL-MCNC: 9.6 MG/DL (ref 8.8–10.2)
CHLORIDE SERPL-SCNC: 96 MMOL/L (ref 98–107)
CREAT SERPL-MCNC: 0.99 MG/DL (ref 0.51–0.95)
DEPRECATED HCO3 PLAS-SCNC: 24 MMOL/L (ref 22–29)
GFR SERPL CREATININE-BSD FRML MDRD: 56 ML/MIN/1.73M2
GLUCOSE SERPL-MCNC: 456 MG/DL (ref 70–99)
POTASSIUM SERPL-SCNC: 4.1 MMOL/L (ref 3.4–5.3)
PROT SERPL-MCNC: 7.1 G/DL (ref 6.4–8.3)
SODIUM SERPL-SCNC: 134 MMOL/L (ref 136–145)

## 2023-08-02 PROCEDURE — 99214 OFFICE O/P EST MOD 30 MIN: CPT | Mod: 24 | Performed by: OPHTHALMOLOGY

## 2023-08-02 PROCEDURE — 92250 FUNDUS PHOTOGRAPHY W/I&R: CPT | Performed by: OPHTHALMOLOGY

## 2023-08-02 PROCEDURE — G0463 HOSPITAL OUTPT CLINIC VISIT: HCPCS | Performed by: OPHTHALMOLOGY

## 2023-08-02 PROCEDURE — 92083 EXTENDED VISUAL FIELD XM: CPT | Performed by: OPHTHALMOLOGY

## 2023-08-02 PROCEDURE — 92134 CPTRZ OPH DX IMG PST SGM RTA: CPT | Performed by: OPHTHALMOLOGY

## 2023-08-02 ASSESSMENT — TONOMETRY
IOP_METHOD: TONOPEN
OS_IOP_MMHG: 15
OD_IOP_MMHG: 14

## 2023-08-02 ASSESSMENT — VISUAL ACUITY
OS_CC: 20/20
CORRECTION_TYPE: GLASSES
OD_CC: 20/20
OS_CC+: -1
METHOD: SNELLEN - LINEAR

## 2023-08-02 ASSESSMENT — SLIT LAMP EXAM - LIDS
COMMENTS: NORMAL, DECREASED TEAR LAKE
COMMENTS: NORMAL, DECREASED TEAR LAKE

## 2023-08-02 ASSESSMENT — REFRACTION_WEARINGRX
OS_SPHERE: +0.25
OD_CYLINDER: +0.75
OS_ADD: +2.75
OS_AXIS: 148
OS_CYLINDER: +0.25
OD_AXIS: 170
OD_ADD: +2.50
OD_SPHERE: -0.25
SPECS_TYPE: BIFOCAL

## 2023-08-02 ASSESSMENT — CUP TO DISC RATIO
OS_RATIO: 0.3
OD_RATIO: 0.3

## 2023-08-02 ASSESSMENT — EXTERNAL EXAM - LEFT EYE: OS_EXAM: NORMAL

## 2023-08-02 ASSESSMENT — CONF VISUAL FIELD: COMMENTS: VF TODAY

## 2023-08-02 NOTE — LETTER
8/2/2023       RE: Betty Tee  [address]  [facility] [facility] [address]     Dear Dr. Lopez,    Thank you for referring your patient, Betty Tee, to the [facility] [place] at [facility]. She has some irregularities in her ocular exam today that I do not think require cessation of her plaquenil at this time. I will treat her severe dry eye and repeat testing in one month. If she continues to have poor results from this testing, we may consider recommending cessation of treatment at that time.    Please see a copy of my visit note below.    HPI       Follow Up     Additional comments: High risk medication use, Plaquenil              Comments    Pt states no change in [place] since last visit  Pt states no flashes, floaters eye pain or redness    Mabel Acevedo COT 12:21 PM August 2, 2023               Last edited by Mabel Acevedo on 8/2/2023 12:21 PM.         HPI  Betty Tee is a 83 year old female with Sjogren's syndrome, PMR who presents for plaquenil screening. Overall doing well. No acute complaints today  No flashes/floaters.      POH: CE/IOL BE [facility] >5 years ago, Sjogren syndrome with dry eye  PMH: Type 2 DM, A fib on coumadin, ANGELICA on CPAP  FHx: Glaucoma in sister and uncle    Current Ocular Meds  AT PRN    Assessment & Plan    (Z79.899) Long-term use of Plaquenil  (primary encounter diagnosis)  (Z79.899) High risk medication use  - For PMR   - Plaquenil start 4/11/2019, 400mg at bedtime (4.71 mg/kg)  - Also Pred 5mg daily  - visual field mac top 4/15/2022   right eye nonspecific defects   Left eye nonspecific defects  - VF 10-2 (s/p dilation, 3/2022)   right eye:superior temporal and inferior temporal loss, reliable   left eye: superior loss, reliable  - FAF 3/1/22   right eye: tr stippled perifoveal hyperautofluoresence   left eye: normal  - OCT Mac 3/1/22   right eye: hyperreflectivity in OPL, otherwise  normal   left eye: normal    - visual field mac top 8/2/23   right eye nonspecific defects, MD -1.1, sLV 2.8   Left eye global decreased total deviation, MD -7.3, sLV 2.0  - spectralis FAF 8/2/23   right eye: tr stippled perifoveal IN hypoautofluoresence, no bulls eye maculopathy   left eye: peripapillary hypoautorlfuorescence, no bulls eye maculopathy  - OCT Mac 8/2/23   right eye: drusen IN hyperreflectivity in RPE, normal IS/OS jx at fovea   left eye:nasal Epiretinal membrane, normal IS/OS jx at fovea  Visual field likely due to dry eye  Will repeat mac top in one month, if persistent changes, may recommend cessation due to abnormal retina and visual field. At this point, do not feel these changes are due to plaquenil toxicity    M35.00) Sjogren syndrome, unspecified (H)  (H04.123) Dry eye syndrome of both eyes  Severe dry eye left eye today, may reflect decreased mac top left eye   Increase AT to four times a day and ointment at bedtime   May consider PP if no improvement    (H52.03,  H52.203,  H52.4) Hyperopia of both eyes with astigmatism and presbyopia  Doing well in current MRx    (Z96.1) Pseudophakia of both eyes  [facility] ~2018   -----------------------------------------------------------------------------------    Patient disposition:   Return in about 4 weeks (around 8/30/2023) for Follow Up-v/t, OCT Macula, mac top.     Attending Physician Attestation:  Complete documentation of historical and exam elements from today's encounter can be found in the full encounter summary report (not reduplicated in this progress note).  I personally obtained the chief complaint(s) and history of present illness.  I confirmed and edited as necessary the review of systems, past medical/surgical history, family history, social history, and examination findings as documented by others; and I examined the patient myself.  I personally reviewed the relevant tests, images, and reports as documented above.  I formulated and edited  as necessary the assessment and plan and discussed the findings and management plan with the patient and family. - Santy Lee MD    Again, thank you for allowing me to participate in the care of your patient.      Sincerely,    Santy Lee MD

## 2023-08-02 NOTE — NURSING NOTE
Chief Complaints and History of Present Illnesses   Patient presents with    Follow Up     High risk medication use, Plaquenil      Chief Complaint(s) and History of Present Illness(es)       Follow Up              Comments: High risk medication use, Plaquenil               Comments    Pt states no change in VA since last visit  Pt states no flashes, floaters eye pain or redness    Mabel Acevedo COT 12:21 PM August 2, 2023

## 2023-08-02 NOTE — PROGRESS NOTES
HPI       Follow Up     Additional comments: High risk medication use, Plaquenil              Comments    Pt states no change in VA since last visit  Pt states no flashes, floaters eye pain or redness    Mabel Acevedo COT 12:21 PM August 2, 2023               Last edited by Mabel Acevedo on 8/2/2023 12:21 PM.         HPI  Betty Tee is a 83 year old female with Sjogren's syndrome, PMR who presents for plaquenil screening. Overall doing well. No acute complaints today  No flashes/floaters.      POH: CE/IOL BE [facility] >5 years ago, Sjogren syndrome with dry eye  PMH: Type 2 DM, A fib on coumadin, ANGELICA on CPAP  FHx: Glaucoma in sister and uncle    Current Ocular Meds  AT PRN    Assessment & Plan    (Z79.899) Long-term use of Plaquenil  (primary encounter diagnosis)  (Z79.899) High risk medication use  - For PMR   - Plaquenil start 4/11/2019, 400mg at bedtime (4.71 mg/kg)  - Also Pred 5mg daily  - visual field mac top 4/15/2022   right eye nonspecific defects   Left eye nonspecific defects  - VF 10-2 (s/p dilation, 3/2022)   right eye:superior temporal and inferior temporal loss, reliable   left eye: superior loss, reliable  - FAF 3/1/22   right eye: tr stippled perifoveal hyperautofluoresence   left eye: normal  - OCT Mac 3/1/22   right eye: hyperreflectivity in OPL, otherwise normal   left eye: normal    - visual field mac top 8/2/23   right eye nonspecific defects, MD -1.1, sLV 2.8   Left eye global decreased total deviation, MD -7.3, sLV 2.0  - spectralis FAF 8/2/23   right eye: tr stippled perifoveal IN hypoautofluoresence, no bulls eye maculopathy   left eye: peripapillary hypoautorlfuorescence, no bulls eye maculopathy  - OCT Mac 8/2/23   right eye: drusen IN hyperreflectivity in RPE, normal IS/OS jx at fovea   left eye:nasal Epiretinal membrane, normal IS/OS jx at fovea  Visual field likely due to dry eye  Will repeat mac top in one month, if persistent changes, may recommend cessation due  to abnormal retina and visual field. At this point, do not feel these changes are due to plaquenil toxicity    M35.00) Sjogren syndrome, unspecified (H)  (H04.123) Dry eye syndrome of both eyes  Severe dry eye left eye today, may reflect decreased mac top left eye   Increase AT to four times a day and ointment at bedtime   May consider PP if no improvement    (H52.03,  H52.203,  H52.4) Hyperopia of both eyes with astigmatism and presbyopia  Doing well in current MRx    (Z96.1) Pseudophakia of both eyes  [facility] ~2018   -----------------------------------------------------------------------------------    Patient disposition:   Return in about 4 weeks (around 8/30/2023) for Follow Up-v/t, OCT Macula, mac top.     Attending Physician Attestation:  Complete documentation of historical and exam elements from today's encounter can be found in the full encounter summary report (not reduplicated in this progress note).  I personally obtained the chief complaint(s) and history of present illness.  I confirmed and edited as necessary the review of systems, past medical/surgical history, family history, social history, and examination findings as documented by others; and I examined the patient myself.  I personally reviewed the relevant tests, images, and reports as documented above.  I formulated and edited as necessary the assessment and plan and discussed the findings and management plan with the patient and family. - Santy Lee MD

## 2023-08-05 NOTE — PROGRESS NOTES
Chief Complaint: RECHECK (12 week post op right SRI)       HPI: Betty Tee returns today in follow-up for total hip.  Reports doing great.  She reports she has little or no pain.  She is walking.  Very happy with how she is doing.  Here with Sister Carolyn RAINES: 85.26    Medications and allergies are documented in the EMR and have been reviewed.    Current Outpatient Medications:      ACCU-CHEK SHANNON PLUS test strip, USE TO TEST BLOOD SUGARS 3 TIMES DAILY, Disp: 300 strip, Rfl: 5     acetaminophen (TYLENOL) 500 MG tablet, Take 2 tablets (1,000 mg) by mouth every 8 hours as needed (max 6 tablets/24 hours, 2 tablets/dose), Disp: 60 tablet, Rfl: 0     acyclovir (ZOVIRAX) 400 MG tablet, Take 1 tablet (400 mg) by mouth every 8 hours For a couple days, Disp: 15 tablet, Rfl: 2     acyclovir (ZOVIRAX) 5 % external ointment, Apply topically as needed (6 times day PRN for outbreaks) As needed for outbreaks, Disp: 15 g, Rfl: 3     albuterol (PROAIR HFA/PROVENTIL HFA/VENTOLIN HFA) 108 (90 Base) MCG/ACT inhaler, Inhale 2 puffs into the lungs every 6 hours as needed for wheezing or shortness of breath, Disp: , Rfl:      alendronate (FOSAMAX) 70 MG tablet, Take 1 tablet (70 mg) by mouth every 7 days, Disp: 12 tablet, Rfl: 1     allopurinol (ZYLOPRIM) 100 MG tablet, Take 1 tablet (100 mg) by mouth daily, Disp: 90 tablet, Rfl: 0     anakinra (KINERET) 100 MG/0.67ML SOSY injection, 100 mg every day x 3 days as needed for flare ups, Disp: 6.7 mL, Rfl: 3     aspirin (ASA) 81 MG EC tablet, Take 1 tablet (81 mg) by mouth 2 times daily, Disp: 60 tablet, Rfl: 0     atorvastatin (LIPITOR) 10 MG tablet, TAKE 1/2 TABLET BY MOUTH EVERY DAY, Disp: 45 tablet, Rfl: 3     azithromycin (ZITHROMAX) 250 MG tablet, TAKE 1 TABLET BY MOUTH EVERY DAY, Disp: 30 tablet, Rfl: 5     BD PEN NEEDLE JANE 2ND GEN 32G X 4 MM miscellaneous, USE TO TEST 4 TIMES A DAY, Disp: 400 each, Rfl: 1     blood glucose (NO BRAND SPECIFIED) lancets standard, Use to  test blood sugar 3 times daily or as directed, Disp: 100 lancet, Rfl: 3     BREO ELLIPTA 100-25 MCG/ACT inhaler, TAKE 1 PUFF BY MOUTH EVERY DAY, Disp: 1 each, Rfl: 11     cevimeline (EVOXAC) 30 MG capsule, Take 1 capsule (30 mg) by mouth 3 times daily as needed, Disp: , Rfl:      Cholecalciferol (VITAMIN D3) 50 MCG (2000 UT) CAPS, TAKE 100 MCG BY MOUTH DAILY (TAKE 2 TABLET (50 MCG) BY MOUTH DAILY - ORAL), Disp: 180 capsule, Rfl: 0     COMPOUNDED NON-CONTROLLED SUBSTANCE (CMPD RX) - PHARMACY TO MIX COMPOUNDED MEDICATION, Estriol 1 mg/g Apply small amount to finger and apply to inside vagina daily for 2 weeks then twice weekly Route: vaginally Dispense 30 grams 11 refills, Disp: 30 g, Rfl: 11     cyanocobalamin (VITAMIN B-12) 1000 MCG tablet, Take 1 tablet (1,000 mcg) by mouth three times a week, Disp: , Rfl:      escitalopram (LEXAPRO) 20 MG tablet, TAKE 1 TABLET BY MOUTH EVERY DAY, Disp: 90 tablet, Rfl: 1     ferrous gluconate (FERGON) 324 (38 Fe) MG tablet, Take 1 tablet (324 mg) by mouth three times a week, Disp: , Rfl:      fluticasone (FLONASE) 50 MCG/ACT nasal spray, SPRAY 1-2 SPRAYS INTO BOTH NOSTRILS DAILY AS NEEDED FOR ALLERGIES, Disp: 16 mL, Rfl: 11     hydroxychloroquine (PLAQUENIL) 200 MG tablet, Take 1 tablet (200 mg) by mouth daily Get annual eye exams for hydroxychloroquine (Plaquenil) monitoring and fax to [phone], Disp: 90 tablet, Rfl: 1     insulin lispro (HUMALOG KWIKPEN) 100 UNIT/ML (1 unit dial) KWIKPEN, Inject Subcutaneous 3 times daily (before meals) Sliding scale insulin; tests BG three times day If BG <150, no insulin given  If -200 take 2 units If -250 take 4 units If -300 take 6 units If -350 take 10 units If BG >350 take 14 units, Disp: , Rfl:      ketoconazole (NIZORAL) 2 % external cream, Apply topically 2 times daily as needed for itching, Disp: 60 g, Rfl: 1     KLOR-CON 20 MEQ CR tablet, TAKE 2 TABLETS (40 MEQ) BY MOUTH EVERY MORNING AND 1 TABLET (20 MEQ)  EVERY EVENING., Disp: 270 tablet, Rfl: 3     lidocaine (LIDODERM) 5 % patch, APPLY PATCH TO PAINFUL AREA FOR UP TO 12 H WITHIN A 24 H PERIOD. REMOVE AFTER 12 HOURS. (Patient taking differently: Apply patch to painful area for up to 12 h within a 24 h period.  Remove after 12 hours.), Disp: 30 patch, Rfl: 2     loratadine (CLARITIN) 10 MG tablet, TAKE 1 TABLET BY MOUTH EVERY DAY, Disp: 90 tablet, Rfl: 3     methocarbamol (ROBAXIN) 750 MG tablet, Take 1 tablet (750 mg) by mouth 3 times daily as needed for muscle spasms, Disp: 90 tablet, Rfl: 3     metoprolol succinate ER (TOPROL XL) 50 MG 24 hr tablet, Take 1 tablet (50 mg) by mouth 2 times daily, Disp: 180 tablet, Rfl: 3     montelukast (SINGULAIR) 10 MG tablet, TAKE 1 TABLET BY MOUTH EVERYDAY AT BEDTIME, Disp: 90 tablet, Rfl: 1     OZEMPIC, 1 MG/DOSE, 4 MG/3ML pen, INJECT 1 MG SUBCUTANEOUS ONCE A WEEK, Disp: 9 mL, Rfl: 1     pantoprazole (PROTONIX) 40 MG EC tablet, Take 1 tablet (40 mg) by mouth daily (replaces famotidine- should stop taking famotidine), Disp: 90 tablet, Rfl: 3     polyethylene glycol (MIRALAX) 17 g packet, Take 17 g by mouth daily, Disp: 10 packet, Rfl: 0     predniSONE (DELTASONE) 10 MG tablet, For gout flares, take the prednisone as 40-30-20-10 mg a day, each course x 3 days then go back to 5 mg a day, Disp: 30 tablet, Rfl: 3     predniSONE (DELTASONE) 5 MG tablet, Take 1 tablet (5 mg) by mouth daily, Disp: 90 tablet, Rfl: 1     torsemide (DEMADEX) 20 MG tablet, Take 2 tablets (40 mg) by mouth every morning AND 1 tablet (20 mg) every evening., Disp: 270 tablet, Rfl: 3     traZODone (DESYREL) 50 MG tablet, Take 1-2 tablets ( mg) by mouth At Bedtime, Disp: , Rfl:   Allergies: Amoxicillin-pot clavulanate, Amoxicillin-pot clavulanate, Codeine, Penicillins, Phenobarbital, and Seasonal allergies    Physical Exam:  On physical examination the patient appears the stated age, is in no acute distress, affect is appropriate, and breathing is  non-labored.  There were no vitals taken for this visit.  There is no height or weight on file to calculate BMI.  Gait: She walks about the room without a walker  Incision: Healed and benign  ROM: Full and painless  Distally, the circulatory, motor, and sensation exam is intact with 5/5 EHL, gastroc-soleus, and tibialis anterior.  Sensation to light touch is intact.  Dorsalis pedis and posterior tibialis pulses are palpable.  There are no sores on the feet, no bruising, and no lymphedema.    X-rays:    I reviewed the x-rays dated today.  Previous films reviewed.    Findings:  Normal progression for a total hip arthroplasty without evidence of loosening or subsidence.    Assessment: Doing very well.  We discussed that there is a total hip and slightly restricted.  Unable to walk much due to her pain.  All patient's questions answered best my ability.  Plan: Return to clinic in 6 weeks sooner if issues.    Referred Self

## 2023-08-07 ENCOUNTER — TELEPHONE (OUTPATIENT)
Dept: FAMILY MEDICINE | Facility: CLINIC | Age: 83
End: 2023-08-07

## 2023-08-07 DIAGNOSIS — Z96.649 STATUS POST HIP REPLACEMENT, UNSPECIFIED LATERALITY: ICD-10-CM

## 2023-08-07 DIAGNOSIS — M16.11 PRIMARY OSTEOARTHRITIS OF RIGHT HIP: Primary | ICD-10-CM

## 2023-08-07 DIAGNOSIS — R53.81 PHYSICAL DECONDITIONING: ICD-10-CM

## 2023-08-07 NOTE — TELEPHONE ENCOUNTER
Triage,   Patient called.  States [facility] is awaiting PT orders from our office.   When orders are completed, please fax to [phone].  Please advise.  Thanks!  Jocelyn BARFIELD

## 2023-08-07 NOTE — TELEPHONE ENCOUNTER
Referrals team- I've sent a few and think it's likely completed, but can you check?  Thanks,  Lev Tristan MD

## 2023-08-08 DIAGNOSIS — M10.9 ACUTE GOUT OF LEFT FOOT, UNSPECIFIED CAUSE: ICD-10-CM

## 2023-08-08 DIAGNOSIS — M06.00 SERONEGATIVE RHEUMATOID ARTHRITIS (H): ICD-10-CM

## 2023-08-08 NOTE — TELEPHONE ENCOUNTER
Looks like there was a verbal order from 7/17/23, and they said they would send orders for me to sign.  Have not seen anything come through- have completed my paperwork.  Started and signed new PT order for [facility].  Hopefully this will go through.  Thanks!  CW

## 2023-08-11 NOTE — TELEPHONE ENCOUNTER
Patient has both on her chart as 10 mg is for taper for acute gout flares and the 5 mg is for daily use.  See office visit note on 7/21/23.  Thanks!    Beatriz Cash RN

## 2023-08-11 NOTE — TELEPHONE ENCOUNTER
PREDNISONE 5 MG TABLET     Is Pt taking 5 Mg Tabs?   Or 10 Mg Tabs?  Both are on the Pt chart.  Please review and updated pharmacy  Thank you     Mere Weinberg RN  Central Triage Red Flags/Med Refills

## 2023-08-12 RX ORDER — PREDNISONE 5 MG/1
5 TABLET ORAL DAILY
Qty: 90 TABLET | Refills: 1 | Status: SHIPPED | OUTPATIENT
Start: 2023-08-12 | End: 2024-04-05

## 2023-08-12 NOTE — TELEPHONE ENCOUNTER
"predniSONE (DELTASONE) 5 MG tablet    90 tablet 1 2/10/2023     7/21/2023  [facility] [place]     Zulma Lopez MD  Rheumatology     \" PMR stable on prednisone 5 mg every day\"  \" Chronic prednisone use\"   \" improved on prednisone, PMR responded to prednisone, now is 5 mg qd.\"     \" Prednisone 5 mg a day\"  "

## 2023-08-14 NOTE — RESULT ENCOUNTER NOTE
-Your CBC labs (which includes blood labs looking for signs of infection or anemia) looks good, and back to normal after the surgery.  -Your CMP (which includes electrolyte levels, blood sugar levels, and kidney and liver function tests) is a bit off, possibly due to the higher glucose level.  We should check this again the next time you are in the clinic.    Please let me know if you have any questions.  Best,   Lev Tristan MD

## 2023-08-17 ENCOUNTER — TELEPHONE (OUTPATIENT)
Dept: PHARMACY | Facility: CLINIC | Age: 83
End: 2023-08-17
Payer: MEDICARE

## 2023-08-17 DIAGNOSIS — E11.65 TYPE 2 DIABETES MELLITUS WITH HYPERGLYCEMIA, WITH LONG-TERM CURRENT USE OF INSULIN (H): Primary | ICD-10-CM

## 2023-08-17 DIAGNOSIS — Z79.4 TYPE 2 DIABETES MELLITUS WITH HYPERGLYCEMIA, WITH LONG-TERM CURRENT USE OF INSULIN (H): Primary | ICD-10-CM

## 2023-08-17 NOTE — TELEPHONE ENCOUNTER
Betty Bennett called.   Would like a call from you sometime to discuss diabetes.   States her numbers are unusually high - patient did not provide any details.  Offered to schedule an appointment w/ you, but she politely declined.   Thanks!  Jocelyn BARFIELD

## 2023-08-17 NOTE — TELEPHONE ENCOUNTER
Called pt on all three phone numbers in chart. VM left at Work and Mobile number.     Sabrina Meek PharmD, JAMES, BCACP  MTM Pharmacist, [facility]

## 2023-08-18 NOTE — TELEPHONE ENCOUNTER
Called pt back -     BG in has been in the 200-240 range fasting and stays high all day. She continues Ozempic 1mg weekly and has been taking Humalog per sliding scale below - usually taking 14 units per her report. She does not have any long-acting insulin in her fridge (only Humalog). She has recently had a Hip replacement (may) then a gout attack for which she was on a prednisone taper, continues prednisone 5mg daily (this is her baseline).     Plan:   Will resume insulin glargine - which she has taken in the past. Will start conservatively at 10 units daily and follow-up on Tuesday for additional adjustment. Rx sent.   Sabrina Meek, MarcosD, JAMES, BCACP  MTM Pharmacist, [facility]     Prior to Admission medications    Medication Sig Last Dose Taking? Auth Provider Long Term End Date   insulin lispro (HUMALOG KWIKPEN) 100 UNIT/ML (1 unit dial) KWIKPEN Inject Subcutaneous 3 times daily (before meals) Sliding scale insulin; tests BG three times day  If BG <150, no insulin given   If -200 take 2 units  If -250 take 4 units  If -300 take 6 units  If -350 take 10 units  If BG >350 take 14 units   Kevin Pressley MD Yes

## 2023-08-19 ENCOUNTER — HEALTH MAINTENANCE LETTER (OUTPATIENT)
Age: 83
End: 2023-08-19

## 2023-08-22 ENCOUNTER — TELEPHONE (OUTPATIENT)
Dept: PHARMACY | Facility: CLINIC | Age: 83
End: 2023-08-22
Payer: MEDICARE

## 2023-08-22 NOTE — TELEPHONE ENCOUNTER
Called pt to recheck BG after insulin glargine start on Saturday.  BG has improved somewhat - was 170 this morning, otherwise in the low 200s. Pre-prandial BG still in the 200s, with one outlier of 400 since Friday.      Plan:   Increase insulin glargine to 15 units  MTM to follow-up on Friday.     Sabrina Meek, PharmD, JAMES, BCACP  MTM Pharmacist, [facility]

## 2023-08-25 NOTE — TELEPHONE ENCOUNTER
Called pt to follow-up - BG not coming down at all. Fasting 214 this morning and has been that or higher since we spoke last on 8/22.     Plan:   Increase lantus to 20 units. If still high on Monday, increase to 25. MTM to call back on Tuesday to check in.     Sabrina Meek, PharmD, JAMES, BCACP  MTM Pharmacist, [facility]

## 2023-08-31 ENCOUNTER — ANCILLARY PROCEDURE (OUTPATIENT)
Dept: BONE DENSITY | Facility: CLINIC | Age: 83
End: 2023-08-31
Attending: INTERNAL MEDICINE
Payer: MEDICARE

## 2023-08-31 ENCOUNTER — OFFICE VISIT (OUTPATIENT)
Dept: ORTHOPEDICS | Facility: CLINIC | Age: 83
End: 2023-08-31
Payer: MEDICARE

## 2023-08-31 DIAGNOSIS — Z96.641 STATUS POST TOTAL REPLACEMENT OF RIGHT HIP: Primary | ICD-10-CM

## 2023-08-31 DIAGNOSIS — I50.32 CHRONIC HEART FAILURE WITH PRESERVED EJECTION FRACTION (H): Primary | ICD-10-CM

## 2023-08-31 DIAGNOSIS — Z78.0 POST-MENOPAUSAL: ICD-10-CM

## 2023-08-31 DIAGNOSIS — Z79.52 CURRENT CHRONIC USE OF SYSTEMIC STEROIDS: ICD-10-CM

## 2023-08-31 PROCEDURE — 77080 DXA BONE DENSITY AXIAL: CPT | Performed by: INTERNAL MEDICINE

## 2023-08-31 PROCEDURE — 99024 POSTOP FOLLOW-UP VISIT: CPT | Performed by: ORTHOPAEDIC SURGERY

## 2023-08-31 ASSESSMENT — HOOS JR
LYING IN BED (TURNING OVER, MAINTAINING HIP POSITION): MILD
GOING UP OR DOWN STAIRS: MILD
RISING FROM SITTING: MILD
HOOS JR TOTAL INTERVAL SCORE: 80.56

## 2023-08-31 NOTE — NURSING NOTE
Reason For Visit:   Chief Complaint   Patient presents with    Surgical Followup     POP Right SRI DOS: 5/31/23 patient has a few PT related questions        LMP  (LMP Unknown)     Pain Assessment  Patient Currently in Pain: Yes  0-10 Pain Scale:  (some minimal nocturnal pain)    Gigi Ceballos, EMT

## 2023-08-31 NOTE — LETTER
"    8/31/2023         RE: Betty Tee  [address]  [place] [address]        Dear Colleague,    Thank you for referring your patient, Betty Tee, to the [facility] [place]. Please see a copy of my visit note below.    Chief Complaint: Surgical Followup (POP Right SRI DOS: 5/31/23 patient has a few PT related questions )       HPI: Betty Tee returns today in follow-up for her total hip. She reports that the hip is doing well. Reports that she has \"zero\" pain but that she is a little discouraged because she feels that she is gaining walking endurance pretty slowly and feels a little unsteady on her feet when she is not using an assist device. Continues to work with a physical therapist. Here with Sister Nghia. Has questions about cane/walker/hiking poles.   Gouty flare in her left wrist has made use of her cane more difficult.     Medications and allergies are documented in the EMR and have been reviewed.    Current Outpatient Medications:     ACCU-CHEK SHANNON PLUS test strip, USE TO TEST BLOOD SUGARS 3 TIMES DAILY, Disp: 300 strip, Rfl: 5    acetaminophen (TYLENOL) 500 MG tablet, Take 2 tablets (1,000 mg) by mouth every 8 hours as needed (max 6 tablets/24 hours, 2 tablets/dose), Disp: 60 tablet, Rfl: 0    acyclovir (ZOVIRAX) 400 MG tablet, Take 1 tablet (400 mg) by mouth every 8 hours For a couple days, Disp: 15 tablet, Rfl: 2    acyclovir (ZOVIRAX) 5 % external ointment, Apply topically as needed (6 times day PRN for outbreaks) As needed for outbreaks, Disp: 15 g, Rfl: 3    albuterol (PROAIR HFA/PROVENTIL HFA/VENTOLIN HFA) 108 (90 Base) MCG/ACT inhaler, Inhale 2 puffs into the lungs every 6 hours as needed for wheezing or shortness of breath, Disp: , Rfl:     alendronate (FOSAMAX) 70 MG tablet, Take 1 tablet (70 mg) by mouth every 7 days, Disp: 12 tablet, Rfl: 1    allopurinol (ZYLOPRIM) 100 MG tablet, Take 1 tablet (100 mg) by mouth daily, Disp: 90 " tablet, Rfl: 0    anakinra (KINERET) 100 MG/0.67ML SOSY injection, 100 mg every day x 3 days as needed for flare ups, Disp: 6.7 mL, Rfl: 3    aspirin (ASA) 81 MG EC tablet, Take 1 tablet (81 mg) by mouth 2 times daily, Disp: 60 tablet, Rfl: 0    atorvastatin (LIPITOR) 10 MG tablet, TAKE 1/2 TABLET BY MOUTH EVERY DAY, Disp: 45 tablet, Rfl: 3    azithromycin (ZITHROMAX) 250 MG tablet, TAKE 1 TABLET BY MOUTH EVERY DAY, Disp: 30 tablet, Rfl: 5    BD PEN NEEDLE JANE 2ND GEN 32G X 4 MM miscellaneous, USE TO TEST 4 TIMES A DAY, Disp: 400 each, Rfl: 1    blood glucose (NO BRAND SPECIFIED) lancets standard, Use to test blood sugar 3 times daily or as directed, Disp: 100 lancet, Rfl: 3    BREO ELLIPTA 100-25 MCG/ACT inhaler, TAKE 1 PUFF BY MOUTH EVERY DAY, Disp: 1 each, Rfl: 11    cevimeline (EVOXAC) 30 MG capsule, Take 1 capsule (30 mg) by mouth 3 times daily as needed, Disp: , Rfl:     Cholecalciferol (VITAMIN D3) 50 MCG (2000 UT) CAPS, TAKE 100 MCG BY MOUTH DAILY (TAKE 2 TABLET (50 MCG) BY MOUTH DAILY - ORAL), Disp: 180 capsule, Rfl: 0    COMPOUNDED NON-CONTROLLED SUBSTANCE (CMPD RX) - PHARMACY TO MIX COMPOUNDED MEDICATION, Estriol 1 mg/g Apply small amount to finger and apply to inside vagina daily for 2 weeks then twice weekly Route: vaginally Dispense 30 grams 11 refills, Disp: 30 g, Rfl: 11    cyanocobalamin (VITAMIN B-12) 1000 MCG tablet, Take 1 tablet (1,000 mcg) by mouth three times a week, Disp: , Rfl:     escitalopram (LEXAPRO) 20 MG tablet, TAKE 1 TABLET BY MOUTH EVERY DAY, Disp: 90 tablet, Rfl: 1    ferrous gluconate (FERGON) 324 (38 Fe) MG tablet, Take 1 tablet (324 mg) by mouth three times a week, Disp: , Rfl:     fluticasone (FLONASE) 50 MCG/ACT nasal spray, SPRAY 1-2 SPRAYS INTO BOTH NOSTRILS DAILY AS NEEDED FOR ALLERGIES, Disp: 16 mL, Rfl: 11    hydroxychloroquine (PLAQUENIL) 200 MG tablet, Take 1 tablet (200 mg) by mouth daily Get annual eye exams for hydroxychloroquine (Plaquenil) monitoring and fax to  [phone], Disp: 90 tablet, Rfl: 1    insulin glargine (LANTUS PEN) 100 UNIT/ML pen, Inject 10 Units Subcutaneous At Bedtime, Disp: 15 mL, Rfl: 1    insulin lispro (HUMALOG KWIKPEN) 100 UNIT/ML (1 unit dial) KWIKPEN, Inject Subcutaneous 3 times daily (before meals) Sliding scale insulin; tests BG three times day If BG <150, no insulin given  If -200 take 2 units If -250 take 4 units If -300 take 6 units If -350 take 10 units If BG >350 take 14 units, Disp: , Rfl:     ketoconazole (NIZORAL) 2 % external cream, Apply topically 2 times daily as needed for itching, Disp: 60 g, Rfl: 1    KLOR-CON 20 MEQ CR tablet, TAKE 2 TABLETS (40 MEQ) BY MOUTH EVERY MORNING AND 1 TABLET (20 MEQ) EVERY EVENING., Disp: 270 tablet, Rfl: 3    lidocaine (LIDODERM) 5 % patch, APPLY PATCH TO PAINFUL AREA FOR UP TO 12 H WITHIN A 24 H PERIOD. REMOVE AFTER 12 HOURS. (Patient taking differently: Apply patch to painful area for up to 12 h within a 24 h period.  Remove after 12 hours.), Disp: 30 patch, Rfl: 2    loratadine (CLARITIN) 10 MG tablet, TAKE 1 TABLET BY MOUTH EVERY DAY, Disp: 90 tablet, Rfl: 3    methocarbamol (ROBAXIN) 750 MG tablet, Take 1 tablet (750 mg) by mouth 3 times daily as needed for muscle spasms, Disp: 90 tablet, Rfl: 3    metoprolol succinate ER (TOPROL XL) 50 MG 24 hr tablet, Take 1 tablet (50 mg) by mouth 2 times daily, Disp: 180 tablet, Rfl: 3    montelukast (SINGULAIR) 10 MG tablet, TAKE 1 TABLET BY MOUTH EVERYDAY AT BEDTIME, Disp: 90 tablet, Rfl: 1    OZEMPIC, 1 MG/DOSE, 4 MG/3ML pen, INJECT 1 MG SUBCUTANEOUS ONCE A WEEK, Disp: 9 mL, Rfl: 1    pantoprazole (PROTONIX) 40 MG EC tablet, Take 1 tablet (40 mg) by mouth daily (replaces famotidine- should stop taking famotidine), Disp: 90 tablet, Rfl: 3    polyethylene glycol (MIRALAX) 17 g packet, Take 17 g by mouth daily, Disp: 10 packet, Rfl: 0    predniSONE (DELTASONE) 10 MG tablet, For gout flares, take the prednisone as 40-30-20-10 mg a day,  each course x 3 days then go back to 5 mg a day, Disp: 30 tablet, Rfl: 3    predniSONE (DELTASONE) 5 MG tablet, Take 1 tablet (5 mg) by mouth daily, Disp: 90 tablet, Rfl: 1    torsemide (DEMADEX) 20 MG tablet, Take 2 tablets (40 mg) by mouth every morning AND 1 tablet (20 mg) every evening., Disp: 270 tablet, Rfl: 3    traZODone (DESYREL) 50 MG tablet, Take 1-2 tablets ( mg) by mouth At Bedtime, Disp: , Rfl:   Allergies: Amoxicillin-pot clavulanate, Amoxicillin-pot clavulanate, Codeine, Penicillins, Phenobarbital, and Seasonal allergies    Physical Exam:  On physical examination the patient appears the stated age, is in no acute distress, affect is appropriate, and breathing is non-labored.  LMP  (LMP Unknown)   There is no height or weight on file to calculate BMI.  She rises easily from a chair without the assist of her arms.   Gait: short wide strides. No limp. Uses cane with minimal pressure on the cane.     Assessment: doing very well s/p total hip. Slower than she would like in increasing walking endurance (can walk about a block or two right now) but consistent with being a minimal household Ambulator/wheelchair user pre-op.  Discussed utility of walker, that she already has one, utility of hiking poles. Patient would like to continue working with a physical therapist as she feels like she is a falls risk. All the patient's questions were answered to the best of my ability.   Plan: PT orders, should RTC at one year but happy to see back at any time if she feels that she Is having issues/not continuing to improve.     Referred Self      Thomas Shannon MD

## 2023-08-31 NOTE — PROGRESS NOTES
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 She reports that the hip is doing well. Reports that she has \"zero\" pain but that she is a little discouraged because she feels that she is gaining walking endurance pretty slowly and feels a little unsteady on her feet when she is not using an assist device. Continues to work with a physical therapist. Here with [facility]. Has questions about cane/walker/hiking poles.   Gouty flare in her left wrist has made use of her cane more difficult.     Medications and allergies are documented in the EMR and have been reviewed.    Current Outpatient Medications:     ACCU-CHEK SHANNON PLUS test strip, USE TO TEST BLOOD SUGARS 3 TIMES DAILY, Disp: 300 strip, Rfl: 5    acetaminophen (TYLENOL) 500 MG tablet, Take 2 tablets (1,000 mg) by mouth every 8 hours as needed (max 6 tablets/24 hours, 2 tablets/dose), Disp: 60 tablet, Rfl: 0    acyclovir (ZOVIRAX) 400 MG tablet, Take 1 tablet (400 mg) by mouth every 8 hours For a couple days, Disp: 15 tablet, Rfl: 2    acyclovir (ZOVIRAX) 5 % external ointment, Apply topically as needed (6 times day PRN for outbreaks) As needed for outbreaks, Disp: 15 g, Rfl: 3    albuterol (PROAIR HFA/PROVENTIL HFA/VENTOLIN HFA) 108 (90 Base) MCG/ACT inhaler, Inhale 2 puffs into the lungs every 6 hours as needed for wheezing or shortness of breath, Disp: , Rfl:     alendronate (FOSAMAX) 70 MG tablet, Take 1 tablet (70 mg) by mouth every 7 days, Disp: 12 tablet, Rfl: 1    allopurinol (ZYLOPRIM) 100 MG tablet, Take 1 tablet (100 mg) by mouth daily, Disp: 90 tablet, Rfl: 0    anakinra (KINERET) 100 MG/0.67ML SOSY injection, 100 mg every day x 3 days as needed for flare ups, Disp: 6.7 mL, Rfl: 3    aspirin (ASA) 81 MG EC tablet, Take 1 tablet (81 mg) by mouth 2 times daily, Disp: 60 tablet, Rfl: 0    atorvastatin (LIPITOR) 10 MG " tablet, TAKE 1/2 TABLET BY MOUTH EVERY DAY, Disp: 45 tablet, Rfl: 3    azithromycin (ZITHROMAX) 250 MG tablet, TAKE 1 TABLET BY MOUTH EVERY DAY, Disp: 30 tablet, Rfl: 5    BD PEN NEEDLE JANE 2ND GEN 32G X 4 MM miscellaneous, USE TO TEST 4 TIMES A DAY, Disp: 400 each, Rfl: 1    blood glucose (NO BRAND SPECIFIED) lancets standard, Use to test blood sugar 3 times daily or as directed, Disp: 100 lancet, Rfl: 3    BREO ELLIPTA 100-25 MCG/ACT inhaler, TAKE 1 PUFF BY MOUTH EVERY DAY, Disp: 1 each, Rfl: 11    cevimeline (EVOXAC) 30 MG capsule, Take 1 capsule (30 mg) by mouth 3 times daily as needed, Disp: , Rfl:     Cholecalciferol (VITAMIN D3) 50 MCG (2000 UT) CAPS, TAKE 100 MCG BY MOUTH DAILY (TAKE 2 TABLET (50 MCG) BY MOUTH DAILY - ORAL), Disp: 180 capsule, Rfl: 0    COMPOUNDED NON-CONTROLLED SUBSTANCE (CMPD RX) - PHARMACY TO MIX COMPOUNDED MEDICATION, Estriol 1 mg/g Apply small amount to finger and apply to inside vagina daily for 2 weeks then twice weekly Route: vaginally Dispense 30 grams 11 refills, Disp: 30 g, Rfl: 11    cyanocobalamin (VITAMIN B-12) 1000 MCG tablet, Take 1 tablet (1,000 mcg) by mouth three times a week, Disp: , Rfl:     escitalopram (LEXAPRO) 20 MG tablet, TAKE 1 TABLET BY MOUTH EVERY DAY, Disp: 90 tablet, Rfl: 1    ferrous gluconate (FERGON) 324 (38 Fe) MG tablet, Take 1 tablet (324 mg) by mouth three times a week, Disp: , Rfl:     fluticasone (FLONASE) 50 MCG/ACT nasal spray, SPRAY 1-2 SPRAYS INTO BOTH NOSTRILS DAILY AS NEEDED FOR ALLERGIES, Disp: 16 mL, Rfl: 11    hydroxychloroquine (PLAQUENIL) 200 MG tablet, Take 1 tablet (200 mg) by mouth daily Get annual eye exams for hydroxychloroquine (Plaquenil) monitoring and fax to [phone], Disp: 90 tablet, Rfl: 1    insulin glargine (LANTUS PEN) 100 UNIT/ML pen, Inject 10 Units Subcutaneous At Bedtime, Disp: 15 mL, Rfl: 1    insulin lispro (HUMALOG KWIKPEN) 100 UNIT/ML (1 unit dial) KWIKPEN, Inject Subcutaneous 3 times daily (before meals) Sliding  scale insulin; tests BG three times day If BG <150, no insulin given  If -200 take 2 units If -250 take 4 units If -300 take 6 units If -350 take 10 units If BG >350 take 14 units, Disp: , Rfl:     ketoconazole (NIZORAL) 2 % external cream, Apply topically 2 times daily as needed for itching, Disp: 60 g, Rfl: 1    KLOR-CON 20 MEQ CR tablet, TAKE 2 TABLETS (40 MEQ) BY MOUTH EVERY MORNING AND 1 TABLET (20 MEQ) EVERY EVENING., Disp: 270 tablet, Rfl: 3    lidocaine (LIDODERM) 5 % patch, APPLY PATCH TO PAINFUL AREA FOR UP TO 12 H WITHIN A 24 H PERIOD. REMOVE AFTER 12 HOURS. (Patient taking differently: Apply patch to painful area for up to 12 h within a 24 h period.  Remove after 12 hours.), Disp: 30 patch, Rfl: 2    loratadine (CLARITIN) 10 MG tablet, TAKE 1 TABLET BY MOUTH EVERY DAY, Disp: 90 tablet, Rfl: 3    methocarbamol (ROBAXIN) 750 MG tablet, Take 1 tablet (750 mg) by mouth 3 times daily as needed for muscle spasms, Disp: 90 tablet, Rfl: 3    metoprolol succinate ER (TOPROL XL) 50 MG 24 hr tablet, Take 1 tablet (50 mg) by mouth 2 times daily, Disp: 180 tablet, Rfl: 3    montelukast (SINGULAIR) 10 MG tablet, TAKE 1 TABLET BY MOUTH EVERYDAY AT BEDTIME, Disp: 90 tablet, Rfl: 1    OZEMPIC, 1 MG/DOSE, 4 MG/3ML pen, INJECT 1 MG SUBCUTANEOUS ONCE A WEEK, Disp: 9 mL, Rfl: 1    pantoprazole (PROTONIX) 40 MG EC tablet, Take 1 tablet (40 mg) by mouth daily (replaces famotidine- should stop taking famotidine), Disp: 90 tablet, Rfl: 3    polyethylene glycol (MIRALAX) 17 g packet, Take 17 g by mouth daily, Disp: 10 packet, Rfl: 0    predniSONE (DELTASONE) 10 MG tablet, For gout flares, take the prednisone as 40-30-20-10 mg a day, each course x 3 days then go back to 5 mg a day, Disp: 30 tablet, Rfl: 3    predniSONE (DELTASONE) 5 MG tablet, Take 1 tablet (5 mg) by mouth daily, Disp: 90 tablet, Rfl: 1    torsemide (DEMADEX) 20 MG tablet, Take 2 tablets (40 mg) by mouth every morning AND 1 tablet (20 mg)  every evening., Disp: 270 tablet, Rfl: 3    traZODone (DESYREL) 50 MG tablet, Take 1-2 tablets ( mg) by mouth At Bedtime, Disp: , Rfl:   Allergies: Amoxicillin-pot clavulanate, Amoxicillin-pot clavulanate, Codeine, Penicillins, Phenobarbital, and Seasonal allergies    Physical Exam:  On physical examination the patient appears the stated age, is in no acute distress, affect is appropriate, and breathing is non-labored.  LMP  (LMP Unknown)   There is no height or weight on file to calculate BMI.  She rises easily from a chair without the assist of her arms.   Gait: short wide strides. No limp. Uses cane with minimal pressure on the cane.     Assessment: doing very well s/p total hip. Slower than she would like in increasing walking endurance (can walk about a block or two right now) but consistent with being a minimal household Ambulator/wheelchair user pre-op.  Discussed utility of walker, that she already has one, utility of hiking poles. Patient would like to continue working with a physical therapist as she feels like she is a falls risk. All the patient's questions were answered to the best of my ability.   Plan: PT orders, should RTC at one year but happy to see back at any time if she feels that she Is having issues/not continuing to improve.     Referred Self

## 2023-09-01 DIAGNOSIS — Z79.899 HIGH RISK MEDICATION USE: ICD-10-CM

## 2023-09-01 DIAGNOSIS — Z79.899 LONG-TERM USE OF PLAQUENIL: Primary | ICD-10-CM

## 2023-09-05 ENCOUNTER — TELEPHONE (OUTPATIENT)
Dept: PHARMACY | Facility: CLINIC | Age: 83
End: 2023-09-05
Payer: MEDICARE

## 2023-09-05 NOTE — TELEPHONE ENCOUNTER
Called patient - Morning blood sugars are in the 160s on Lantus 20 units. She continues sliding scale Humalog with meals - has had some higher BG after eating, but feeling good about where things are at. Does not have meter available for more specific readings. Will continue to check and follow-up next week for further adjustments.     Sabrina Meek, MarcosD, JAMES, BCACP  MTM Pharmacist, [facility]

## 2023-09-06 ENCOUNTER — OFFICE VISIT (OUTPATIENT)
Dept: OPHTHALMOLOGY | Facility: CLINIC | Age: 83
End: 2023-09-06
Attending: OPHTHALMOLOGY
Payer: MEDICARE

## 2023-09-06 DIAGNOSIS — Z79.899 HIGH RISK MEDICATION USE: ICD-10-CM

## 2023-09-06 DIAGNOSIS — Z79.899 LONG-TERM USE OF PLAQUENIL: ICD-10-CM

## 2023-09-06 PROCEDURE — 92134 CPTRZ OPH DX IMG PST SGM RTA: CPT | Performed by: OPHTHALMOLOGY

## 2023-09-06 PROCEDURE — 92083 EXTENDED VISUAL FIELD XM: CPT | Performed by: OPHTHALMOLOGY

## 2023-09-06 PROCEDURE — 99213 OFFICE O/P EST LOW 20 MIN: CPT | Performed by: OPHTHALMOLOGY

## 2023-09-06 PROCEDURE — G0463 HOSPITAL OUTPT CLINIC VISIT: HCPCS | Performed by: OPHTHALMOLOGY

## 2023-09-06 ASSESSMENT — CONF VISUAL FIELD
OD_SUPERIOR_TEMPORAL_RESTRICTION: 0
OD_SUPERIOR_NASAL_RESTRICTION: 0
OS_SUPERIOR_NASAL_RESTRICTION: 0
OD_NORMAL: 1
METHOD: COUNTING FINGERS
OS_INFERIOR_NASAL_RESTRICTION: 0
OS_INFERIOR_TEMPORAL_RESTRICTION: 0
OD_INFERIOR_NASAL_RESTRICTION: 0
OS_NORMAL: 1
OD_INFERIOR_TEMPORAL_RESTRICTION: 0
OS_SUPERIOR_TEMPORAL_RESTRICTION: 0

## 2023-09-06 ASSESSMENT — VISUAL ACUITY
CORRECTION_TYPE: GLASSES
METHOD: SNELLEN - LINEAR
OD_CC+: -1
OS_CC: 20/25
OD_CC: 20/20

## 2023-09-06 ASSESSMENT — REFRACTION_WEARINGRX
OD_AXIS: 170
OD_SPHERE: -0.25
OS_AXIS: 148
OS_SPHERE: +0.25
OS_ADD: +2.75
OD_CYLINDER: +0.75
OS_CYLINDER: +0.25
OD_ADD: +2.50
SPECS_TYPE: BIFOCAL

## 2023-09-06 ASSESSMENT — TONOMETRY
OS_IOP_MMHG: 10
IOP_METHOD: ICARE
OD_IOP_MMHG: 10

## 2023-09-06 ASSESSMENT — EXTERNAL EXAM - LEFT EYE: OS_EXAM: NORMAL

## 2023-09-06 ASSESSMENT — SLIT LAMP EXAM - LIDS
COMMENTS: NORMAL, DECREASED TEAR LAKE
COMMENTS: NORMAL, DECREASED TEAR LAKE

## 2023-09-06 NOTE — NURSING NOTE
Chief Complaints and History of Present Illnesses   Patient presents with    Follow Up     Chief Complaint(s) and History of Present Illness(es)       Follow Up              Laterality: both eyes    Associated symptoms: Negative for flashes and floaters    Treatments tried: artificial tears    Pain scale: 0/10              Comments    Follow up for long term use of Plaquenil and dry eyes.  The patient notes she is using gel drops at bedtime.    The patient notes she never thought about seeing something out of the corner of her left eye.  She notes it is seldom.  Velia Hernández, COA, COA 1:18 PM 09/06/2023

## 2023-09-06 NOTE — PROGRESS NOTES
HPI       Follow Up    In both eyes.  Associated symptoms include Negative for flashes and floaters.  Treatments tried include artificial tears.  Pain was noted as 0/10.             Comments    Follow up for long term use of Plaquenil and dry eyes.  The patient notes she is using gel drops at bedtime.    The patient notes she never thought about seeing something out of the corner of her left eye.  She notes it is seldom.  TIFFANY Espinosa, COA 1:18 PM 09/06/2023              Last edited by Velia Hernández COA on 9/6/2023  1:18 PM.         HPI  Betty Tee is a 83 year old female with Sjogren's syndrome, PMR who presents for repeat cox visual field (HVF) 10-2 after lubricating four times a day with artificial tears and refresh gel at bedtime. Doing much better today.     POH: CE/IOL [facility] >5 years ago, Sjogren syndrome with dry eye  PMH: Type 2 DM, A fib on coumadin, ANGELICA on CPAP  FHx: Glaucoma in sister and uncle    Current Ocular Meds  AT four times a day   Gel drop at bedtime     Assessment & Plan    (Z79.899) Long-term use of Plaquenil  (primary encounter diagnosis)  (Z79.899) High risk medication use  - For PMR   - Plaquenil start 4/11/2019, 400mg at bedtime (4.71 mg/kg)  - Also Pred 5mg daily  - visual field mac top 4/15/2022   right eye nonspecific defects   Left eye nonspecific defects  - VF 10-2 (s/p dilation, 3/2022)   right eye:superior temporal and inferior temporal loss, reliable   left eye: superior loss, reliable  - FAF 3/1/22   right eye: tr stippled perifoveal hyperautofluoresence   left eye: normal  - OCT Mac 3/1/22   right eye: hyperreflectivity in OPL, otherwise normal   left eye: normal    - visual field mac top 8/2/23   right eye nonspecific defects, MD -1.1, sLV 2.8   Left eye global decreased total deviation, MD -7.3, sLV 2.0  - spectralis FAF 8/2/23   right eye: tr stippled perifoveal IN hypoautofluoresence, no bulls eye maculopathy   left eye:  peripapillary hypoautorlfuorescence, no bulls eye maculopathy  - OCT Mac 8/2/23   right eye: drusen IN hyperreflectivity in RPE, normal IS/OS jx at fovea   left eye:nasal Epiretinal membrane, normal IS/OS jx at fovea  - OCT Mac 09/06/23    right eye: nasal Epiretinal membrane, drusen IN hyperreflectivity in RPE, normal IS/OS jx at fovea   left eye:nasal Epiretinal membrane, normal IS/OS jx at fovea    - visual field mac top  09/06/23    right eye nonspecific defects, MD -1.0, sLV 2.9   Left eye global decreased total deviation, MD -0.8, sLV 2.7    Low concern for plaquenil toxicity causing visual field deficit as resolved with lubrication  Repeat plaquenil screening annually    M35.00) Sjogren syndrome, unspecified (H)  (H04.123) Dry eye syndrome of both eyes  Severe dry eye left eye today, may reflect decreased mac top left eye   Increase AT to four times a day and ointment at bedtime   May consider PP if no improvement    (H52.03,  H52.203,  H52.4) Hyperopia of both eyes with astigmatism and presbyopia  Doing well in current MRx    (Z96.1) Pseudophakia of both eyes  [facility] ~2018   -----------------------------------------------------------------------------------    Patient disposition:   Return in about 1 year (around 9/6/2024) for Annual Visit-v/t/d/MRx, OCT Macula, 10-2 VF, Fundus Autofluorescence, color plates.     Attending Physician Attestation:  Complete documentation of historical and exam elements from today's encounter can be found in the full encounter summary report (not reduplicated in this progress note).  I personally obtained the chief complaint(s) and history of present illness.  I confirmed and edited as necessary the review of systems, past medical/surgical history, family history, social history, and examination findings as documented by others; and I examined the patient myself.  I personally reviewed the relevant tests, images, and reports as documented above.  I formulated and edited as  necessary the assessment and plan and discussed the findings and management plan with the patient and family. - Santy Lee MD

## 2023-09-07 ENCOUNTER — OFFICE VISIT (OUTPATIENT)
Dept: CARDIOLOGY | Facility: CLINIC | Age: 83
End: 2023-09-07
Attending: INTERNAL MEDICINE
Payer: MEDICARE

## 2023-09-07 ENCOUNTER — LAB (OUTPATIENT)
Dept: LAB | Facility: CLINIC | Age: 83
End: 2023-09-07
Payer: MEDICARE

## 2023-09-07 VITALS
WEIGHT: 175.6 LBS | BODY MASS INDEX: 28.22 KG/M2 | OXYGEN SATURATION: 96 % | HEART RATE: 59 BPM | DIASTOLIC BLOOD PRESSURE: 80 MMHG | SYSTOLIC BLOOD PRESSURE: 145 MMHG | HEIGHT: 66 IN

## 2023-09-07 DIAGNOSIS — D50.9 IRON DEFICIENCY ANEMIA, UNSPECIFIED IRON DEFICIENCY ANEMIA TYPE: ICD-10-CM

## 2023-09-07 DIAGNOSIS — I50.32 CHRONIC HEART FAILURE WITH PRESERVED EJECTION FRACTION (H): ICD-10-CM

## 2023-09-07 DIAGNOSIS — Z79.4 TYPE 2 DIABETES MELLITUS WITH HYPERGLYCEMIA, WITH LONG-TERM CURRENT USE OF INSULIN (H): ICD-10-CM

## 2023-09-07 DIAGNOSIS — E11.65 TYPE 2 DIABETES MELLITUS WITH HYPERGLYCEMIA, WITH LONG-TERM CURRENT USE OF INSULIN (H): ICD-10-CM

## 2023-09-07 DIAGNOSIS — K31.819 GAVE (GASTRIC ANTRAL VASCULAR ECTASIA): ICD-10-CM

## 2023-09-07 LAB
ANION GAP SERPL CALCULATED.3IONS-SCNC: 13 MMOL/L (ref 7–15)
BUN SERPL-MCNC: 12.1 MG/DL (ref 8–23)
CALCIUM SERPL-MCNC: 9.1 MG/DL (ref 8.8–10.2)
CHLORIDE SERPL-SCNC: 100 MMOL/L (ref 98–107)
CREAT SERPL-MCNC: 0.91 MG/DL (ref 0.51–0.95)
CREAT UR-MCNC: 25.1 MG/DL
DEPRECATED HCO3 PLAS-SCNC: 25 MMOL/L (ref 22–29)
EGFRCR SERPLBLD CKD-EPI 2021: 62 ML/MIN/1.73M2
ERYTHROCYTE [DISTWIDTH] IN BLOOD BY AUTOMATED COUNT: 14.1 % (ref 10–15)
GLUCOSE SERPL-MCNC: 273 MG/DL (ref 70–99)
HCT VFR BLD AUTO: 39.6 % (ref 35–47)
HGB BLD-MCNC: 13 G/DL (ref 11.7–15.7)
LVEF ECHO: NORMAL
MCH RBC QN AUTO: 29.5 PG (ref 26.5–33)
MCHC RBC AUTO-ENTMCNC: 32.8 G/DL (ref 31.5–36.5)
MCV RBC AUTO: 90 FL (ref 78–100)
MICROALBUMIN UR-MCNC: 22.9 MG/L
MICROALBUMIN/CREAT UR: 91.24 MG/G CR (ref 0–25)
NT-PROBNP SERPL-MCNC: 324 PG/ML (ref 0–1800)
PLATELET # BLD AUTO: 227 10E3/UL (ref 150–450)
POTASSIUM SERPL-SCNC: 3.9 MMOL/L (ref 3.4–5.3)
RBC # BLD AUTO: 4.4 10E6/UL (ref 3.8–5.2)
SODIUM SERPL-SCNC: 138 MMOL/L (ref 136–145)
WBC # BLD AUTO: 11.7 10E3/UL (ref 4–11)

## 2023-09-07 PROCEDURE — G0463 HOSPITAL OUTPT CLINIC VISIT: HCPCS | Performed by: INTERNAL MEDICINE

## 2023-09-07 PROCEDURE — 85027 COMPLETE CBC AUTOMATED: CPT | Performed by: PATHOLOGY

## 2023-09-07 PROCEDURE — 82570 ASSAY OF URINE CREATININE: CPT | Performed by: FAMILY MEDICINE

## 2023-09-07 PROCEDURE — 93306 TTE W/DOPPLER COMPLETE: CPT | Mod: 26 | Performed by: INTERNAL MEDICINE

## 2023-09-07 PROCEDURE — 99000 SPECIMEN HANDLING OFFICE-LAB: CPT | Performed by: PATHOLOGY

## 2023-09-07 PROCEDURE — 99215 OFFICE O/P EST HI 40 MIN: CPT | Mod: 25 | Performed by: INTERNAL MEDICINE

## 2023-09-07 PROCEDURE — 83880 ASSAY OF NATRIURETIC PEPTIDE: CPT | Performed by: PATHOLOGY

## 2023-09-07 PROCEDURE — 80048 BASIC METABOLIC PNL TOTAL CA: CPT | Performed by: PATHOLOGY

## 2023-09-07 PROCEDURE — 36415 COLL VENOUS BLD VENIPUNCTURE: CPT | Performed by: PATHOLOGY

## 2023-09-07 ASSESSMENT — PAIN SCALES - GENERAL: PAINLEVEL: NO PAIN (0)

## 2023-09-07 NOTE — NURSING NOTE
Chief Complaint   Patient presents with    Follow Up     9/7/23: Moe: RHF, Labs and echo prior         Vitals were taken, medications reconciled.    Milton Mckeon, Facilitator   2:07 PM  Vitals were taken. Medications reconciled.   Milton Mckeon - Visit Facilitator

## 2023-09-07 NOTE — LETTER
9/7/2023      RE: Betty Tee  [address]  [facility] [id number]       Dear Colleague,    Thank you for the opportunity to participate in the care of your patient, Betty Tee, at the [facility] [place] at [facility]. Please see a copy of my visit note below.    September 7, 2023    Follow up HFpEF    HPI:   Patient has interstitial lung disease (moderate restriction), Sjogren's syndrome, HTN, atrial fibrillation, diverticulitis s/p colectomy 2011 who I am following for HFpEF. She is here for routine HFpEF follow up.     Recent events:     She developed norovirus and then COVID-19 in March 2023.  She lost a lot of strength with this.    She just had R hip surgery and feels much better and recovery is going well.   Overall her blood pressure had been a little softer, she has been off of spironolactone since she was sick in May 2023.   She had a watchman placed for GI bleeding on anticoagulation    She does have paroxysmal atrial fibrillation she is only on baby aspirin       This visit:   She feels she is recovering well from her hip surgery  The pain is so much better she wished she did it sooner  She feels she is getting stronger every day  No lower extremity edema  No PND orthopnea  She is more limited from her musculoskeletal issues than her breathing presently  Blood pressures at home of been well controlled    She does wear her CPAP mask routinely.     She presently denies dark stools.      PAST MEDICAL HISTORY:  Past Medical History:   Diagnosis Date    Alcohol abuse, in remission     Allergic rhinitis, cause unspecified     allegra helps when she takes it    Antiplatelet or antithrombotic long-term use     Atrial fibrillation (H)     in hosp in 11/11 after surgery w/ fluid overload    Cardiomegaly     LVH on stress echo- cardiac w/u at [facility] ER- neg CT scan for PE, neg stress echo in 8/06    Chest pain, unspecified      Congestive heart failure (H)     Depressive disorder     Diabetes (H)     Disorder of bone and cartilage, unspecified     osteopenia (had been on prempro), improved on 6/06 dexa, stable dexa 11/10    Diverticulosis of colon (without mention of hemorrhage)     last episode yrs ago    Essential hypertension, benign     Follicular bronchiolitis (H)     associated with Sjogrens, dx by chest CT showing mosaic attenuation and air trapping    Gastro-oesophageal reflux disease     ILD (interstitial lung disease) (H)     associated with Sjogrens, also has mildly elevated IgG4, first noted on chest CT 2015 (mild changes) and also has small airways disease; ILD improved on follow up chest CT 2018.    Insomnia, unspecified     weaned off clonazepam    Irregular heart beat     Lumbago 07/2009    MRI with DJD, now seeing Dr. Cain for sciatic sx's    Major depressive disorder, recurrent episode, moderate (H)     Obstructive sleep apnea     uses cpap    Osteoarthrosis, unspecified whether generalized or localized, unspecified site     Sjogren's syndrome (H)     + RG and SSA and lip bx    Sleep apnea     uses a cpap machine    Tobacco use disorder     chantix in 9/07, started again in 6/08, working     FAMILY HISTORY:  Mother had MI and CHF in her 60's. 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      CURRENT MEDICATIONS:    Current Outpatient Medications   Medication Sig Dispense Refill    ACCU-CHEK SHANNON PLUS test strip USE TO TEST BLOOD SUGARS 3 TIMES DAILY 300 strip 5    acetaminophen (TYLENOL) 500 MG tablet Take 2 tablets (1,000 mg) by mouth every 8 hours as needed (max 6 tablets/24 hours, 2 tablets/dose) 60 tablet 0    acyclovir (ZOVIRAX) 400 MG tablet Take 1 tablet (400 mg) by mouth every 8 hours For a couple days 15 tablet 2    acyclovir (ZOVIRAX) 5 % external ointment Apply topically as needed (6 times day PRN for outbreaks) As needed for  outbreaks 15 g 3    albuterol (PROAIR HFA/PROVENTIL HFA/VENTOLIN HFA) 108 (90 Base) MCG/ACT inhaler Inhale 2 puffs into the lungs every 6 hours as needed for wheezing or shortness of breath      alendronate (FOSAMAX) 70 MG tablet Take 1 tablet (70 mg) by mouth every 7 days 12 tablet 1    allopurinol (ZYLOPRIM) 100 MG tablet Take 1 tablet (100 mg) by mouth daily 90 tablet 0    anakinra (KINERET) 100 MG/0.67ML SOSY injection 100 mg every day x 3 days as needed for flare ups 6.7 mL 3    aspirin (ASA) 81 MG EC tablet Take 1 tablet (81 mg) by mouth 2 times daily 60 tablet 0    atorvastatin (LIPITOR) 10 MG tablet TAKE 1/2 TABLET BY MOUTH EVERY DAY 45 tablet 3    azithromycin (ZITHROMAX) 250 MG tablet TAKE 1 TABLET BY MOUTH EVERY DAY 30 tablet 5    BD PEN NEEDLE JANE 2ND GEN 32G X 4 MM miscellaneous USE TO TEST 4 TIMES A  each 1    blood glucose (NO BRAND SPECIFIED) lancets standard Use to test blood sugar 3 times daily or as directed 100 lancet 3    BREO ELLIPTA 100-25 MCG/ACT inhaler TAKE 1 PUFF BY MOUTH EVERY DAY 1 each 11    cevimeline (EVOXAC) 30 MG capsule Take 1 capsule (30 mg) by mouth 3 times daily as needed      Cholecalciferol (VITAMIN D3) 50 MCG (2000 UT) CAPS TAKE 100 MCG BY MOUTH DAILY (TAKE 2 TABLET (50 MCG) BY MOUTH DAILY - ORAL) 180 capsule 0    COMPOUNDED NON-CONTROLLED SUBSTANCE (CMPD RX) - PHARMACY TO MIX COMPOUNDED MEDICATION Estriol 1 mg/g Apply small amount to finger and apply to inside vagina daily for 2 weeks then twice weekly Route: vaginally Dispense 30 grams 11 refills 30 g 11    cyanocobalamin (VITAMIN B-12) 1000 MCG tablet Take 1 tablet (1,000 mcg) by mouth three times a week      escitalopram (LEXAPRO) 20 MG tablet TAKE 1 TABLET BY MOUTH EVERY DAY 90 tablet 1    ferrous gluconate (FERGON) 324 (38 Fe) MG tablet Take 1 tablet (324 mg) by mouth three times a week      fluticasone (FLONASE) 50 MCG/ACT nasal spray SPRAY 1-2 SPRAYS INTO BOTH NOSTRILS DAILY AS NEEDED FOR ALLERGIES 16 mL 11     hydroxychloroquine (PLAQUENIL) 200 MG tablet Take 1 tablet (200 mg) by mouth daily Get annual eye exams for hydroxychloroquine (Plaquenil) monitoring and fax to [phone] 90 tablet 1    insulin glargine (LANTUS PEN) 100 UNIT/ML pen Inject 10 Units Subcutaneous At Bedtime 15 mL 1    insulin lispro (HUMALOG KWIKPEN) 100 UNIT/ML (1 unit dial) KWIKPEN Inject Subcutaneous 3 times daily (before meals) Sliding scale insulin; tests BG three times day  If BG <150, no insulin given   If -200 take 2 units  If -250 take 4 units  If -300 take 6 units  If -350 take 10 units  If BG >350 take 14 units      ketoconazole (NIZORAL) 2 % external cream Apply topically 2 times daily as needed for itching 60 g 1    KLOR-CON 20 MEQ CR tablet TAKE 2 TABLETS (40 MEQ) BY MOUTH EVERY MORNING AND 1 TABLET (20 MEQ) EVERY EVENING. 270 tablet 3    lidocaine (LIDODERM) 5 % patch APPLY PATCH TO PAINFUL AREA FOR UP TO 12 H WITHIN A 24 H PERIOD. REMOVE AFTER 12 HOURS. (Patient taking differently: Apply patch to painful area for up to 12 h within a 24 h period.  Remove after 12 hours.) 30 patch 2    loratadine (CLARITIN) 10 MG tablet TAKE 1 TABLET BY MOUTH EVERY DAY 90 tablet 3    methocarbamol (ROBAXIN) 750 MG tablet Take 1 tablet (750 mg) by mouth 3 times daily as needed for muscle spasms 90 tablet 3    metoprolol succinate ER (TOPROL XL) 50 MG 24 hr tablet Take 1 tablet (50 mg) by mouth 2 times daily 180 tablet 3    montelukast (SINGULAIR) 10 MG tablet TAKE 1 TABLET BY MOUTH EVERYDAY AT BEDTIME 90 tablet 1    OZEMPIC, 1 MG/DOSE, 4 MG/3ML pen INJECT 1 MG SUBCUTANEOUS ONCE A WEEK 9 mL 1    pantoprazole (PROTONIX) 40 MG EC tablet Take 1 tablet (40 mg) by mouth daily (replaces famotidine- should stop taking famotidine) 90 tablet 3    polyethylene glycol (MIRALAX) 17 g packet Take 17 g by mouth daily 10 packet 0    predniSONE (DELTASONE) 10 MG tablet For gout flares, take the prednisone as 40-30-20-10 mg a day, each course x 3  "days then go back to 5 mg a day 30 tablet 3    predniSONE (DELTASONE) 5 MG tablet Take 1 tablet (5 mg) by mouth daily 90 tablet 1    torsemide (DEMADEX) 20 MG tablet Take 2 tablets (40 mg) by mouth every morning AND 1 tablet (20 mg) every evening. 270 tablet 3    traZODone (DESYREL) 50 MG tablet Take 1-2 tablets ( mg) by mouth At Bedtime         ROS:   Constitutional: No fever, chills.  Generally losing weight over the last year and strength  ENT: eye hematoma ear ache, epistaxis, sore throat.   Allergies/Immunologic: Negative.   Respiratory: + intermittent cough, no hemoptysis.   Cardiovascular: As per HPI.   GI: No nausea, vomiting, stools have not been particularly dark  : No urinary frequency, dysuria, or hematuria.   Integument: Negative.   Psychiatric: Frustrated about how much pain she is in-not sleeping well  Neuro: Negative.   Endocrinology: Negative.   Musculoskeletal: R hip is better since surgery + gout     EXAM:  BP (!) 145/80 (BP Location: Right arm, Patient Position: Chair, Cuff Size: Adult Regular)   Pulse 59   Ht 1.676 m (5' 5.98\")   Wt 79.7 kg (175 lb 9.6 oz)   LMP  (LMP Unknown)   SpO2 96%   BMI 28.36 kg/m    General: appears comfortable, alert and articulate, with walker   Head: normocephalic, atraumatic  Neck: no adenopathy  Orophyarynx: moist mucosa, no lesions, dentition intact  Heart: regular, S1/S2, no murmur, gallop, rub, estimated JVP <10   Lungs: diffuse expiratory wheezing, no crackles,no dullness   Abdomen: soft, non-tender, bowel sounds present, no hepatosplenomegaly  Extremities: trace LE edema, no clubbing, cyanosis.-Thin upper thighs  Neurological: normal speech and affect, no gross motor deficits      Data   Regadenosen MPI 2014: no fixed or inducible defects      Last EKG from 3/7/2023 is sinus rhythm with old anterior infarct pattern      Echo from today final read pending but ejection fraction visually 55 to 60%  Normal IVC  No significant valve " disease    Laboratory data personally reviewed     Latest Reference Range & Units 09/07/23 12:39   Sodium 136 - 145 mmol/L 138   Potassium 3.4 - 5.3 mmol/L 3.9   Chloride 98 - 107 mmol/L 100   Carbon Dioxide (CO2) 22 - 29 mmol/L 25   Urea Nitrogen 8.0 - 23.0 mg/dL 12.1   Creatinine 0.51 - 0.95 mg/dL 0.91   GFR Estimate >60 mL/min/1.73m2 62   Calcium 8.8 - 10.2 mg/dL 9.1   Anion Gap 7 - 15 mmol/L 13         Assessment and Plan:  In summary this is a very pleasant 83  F with HFpEF who is here for follow up.     In support of the diagnosis of HFpEF includes mild LA enlargement, echocardiographic signs of increase LV filling pressures, increased nt-bnp, as well as a diuretic requirement and symptomatic improvement on diuretics.    The risk factors for HFpEF in her include age, gender, HTN, diabetes, sleep apnea and obesity.  A prior stress test was negative for CAD. She is presently euvolemic on exam and is NYHA class II(mutifactorial).    The mainstays of therapy for HFpEF include volume management, blood pressure control, treating underlying sleep apnea if present, weight management, exercise training, rate control for atrial fibrillation as well as consideration for a rhythm control strategy, as well as consideration for clinical trials.     HFpEF: She is NYHA class II, stage C, euvolmeci today   Given weight loss, low blood pressures etc. over the last 6 to 8 months we stopped her spironolactone  She is looking so well today-euvolemic blood pressure controlled NT proBNP quite low that I would rather not add more medications at this point. \the future we could consider adding back Aldactone and SGL 2 inhibitor    Atrial fibrillation [id number] (age >75, HTN, CHF, gender) -paroxysmal last-EKG  showing sinus rhythm  S/p watchman 9/22   On asprin 81 mg daily     Primary prevention: on statin, on ASA  -I do not want to lower her dose of statin from here as she is on such a low-dose already    Frailty-improved  encouragement provided    For now she will focus on regaining her strength and treating her gout with her rheumatologist      Return to clinic in 6 months with labs sooner as needed      Radha Rosa MD  Associate Professor of Medicine   [facility] Division of Cardiology

## 2023-09-07 NOTE — PATIENT INSTRUCTIONS
"Cardiology Providers you saw during your visit:  Dr. Rosa    Medication changes:     No changes  Follow up:  Follow up in 6 months with labs prior.      Please call if you have :  1. Weight gain of more than 2 pounds in a day or 5 pounds in a week  2. Increased shortness of breath, swelling or bloating  3. Dizziness, lightheadedness   4. Any questions or concerns.       Follow the American Heart Association Diet and Lifestyle recommendations:  Limit saturated fat, trans fat, sodium, red meat, sweets and sugar-sweetened beverages. If you choose to eat red meat, compare labels and select the leanest cuts available.  Aim for at least 150 minutes of moderate physical activity or 75 minutes of vigorous physical activity - or an equal combination of both - each week.      During business hours: [phone], press option # 1 to schedule and leave a message for your care team.        After hours, weekends or holidays: On Call Cardiologist- [phone]   option #4 and ask to speak to the on-call Cardiologist. Inform them you are a CORE/heart failure patient at the [facility].      Heart Failure Support Group  Virtual meetings 2022, , 1-2 p.m.  , 1-2 p.m.    Virtual meetings     Flor Velasquez RN BSN CHFN  Cardiology Care Coordinator - Heart Failure/ [facility]  [facility]   Questions and [phone]   First press #1 for the [email or web address] and \"To send a message to your care team\"    "

## 2023-09-07 NOTE — PROGRESS NOTES
September 7, 2023    Follow up HFpEF    HPI:   Patient has interstitial lung disease (moderate restriction), Sjogren's syndrome, HTN, atrial fibrillation, diverticulitis s/p colectomy 2011 who I am following for HFpEF. She is here for routine HFpEF follow up.     Recent events:     She developed norovirus and then COVID-19 in March 2023.  She lost a lot of strength with this.    She just had R hip surgery and feels much better and recovery is going well.   Overall her blood pressure had been a little softer, she has been off of spironolactone since she was sick in May 2023.   She had a watchman placed for GI bleeding on anticoagulation    She does have paroxysmal atrial fibrillation she is only on baby aspirin       This visit:   She feels she is recovering well from her hip surgery  The pain is so much better she wished she did it sooner  She feels she is getting stronger every day  No lower extremity edema  No PND orthopnea  She is more limited from her musculoskeletal issues than her breathing presently  Blood pressures at home of been well controlled    She does wear her CPAP mask routinely.     She presently denies dark stools.      PAST MEDICAL HISTORY:  Past Medical History:   Diagnosis Date    Alcohol abuse, in remission     Allergic rhinitis, cause unspecified     allegra helps when she takes it    Antiplatelet or antithrombotic long-term use     Atrial fibrillation (H)     in hosp in 11/11 after surgery w/ fluid overload    Cardiomegaly     LVH on stress echo- cardiac w/u at [facility] ER- neg CT scan for PE, neg stress echo in 8/06    Chest pain, unspecified     Congestive heart failure (H)     Depressive disorder     Diabetes (H)     Disorder of bone and cartilage, unspecified     osteopenia (had been on prempro), improved on 6/06 dexa, stable dexa 11/10    Diverticulosis of colon (without mention of hemorrhage)     last episode yrs ago    Essential hypertension, benign     Follicular bronchiolitis (H)      associated with Sjogrens, dx by chest CT showing mosaic attenuation and air trapping    Gastro-oesophageal reflux disease     ILD (interstitial lung disease) (H)     associated with Sjogrens, also has mildly elevated IgG4, first noted on chest CT 2015 (mild changes) and also has small airways disease; ILD improved on follow up chest CT 2018.    Insomnia, unspecified     weaned off clonazepam    Irregular heart beat     Lumbago 07/2009    MRI with DJD, now seeing Dr. Cain for sciatic sx's    Major depressive disorder, recurrent episode, moderate (H)     Obstructive sleep apnea     uses cpap    Osteoarthrosis, unspecified whether generalized or localized, unspecified site     Sjogren's syndrome (H)     + RG and SSA and lip bx    Sleep apnea     uses a cpap machine    Tobacco use disorder     chantix in 9/07, started again in 6/08, working     FAMILY HISTORY:  Mother had MI and CHF in her 60's. 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      CURRENT MEDICATIONS:    Current Outpatient Medications   Medication Sig Dispense Refill    ACCU-CHEK SHANNON PLUS test strip USE TO TEST BLOOD SUGARS 3 TIMES DAILY 300 strip 5    acetaminophen (TYLENOL) 500 MG tablet Take 2 tablets (1,000 mg) by mouth every 8 hours as needed (max 6 tablets/24 hours, 2 tablets/dose) 60 tablet 0    acyclovir (ZOVIRAX) 400 MG tablet Take 1 tablet (400 mg) by mouth every 8 hours For a couple days 15 tablet 2    acyclovir (ZOVIRAX) 5 % external ointment Apply topically as needed (6 times day PRN for outbreaks) As needed for outbreaks 15 g 3    albuterol (PROAIR HFA/PROVENTIL HFA/VENTOLIN HFA) 108 (90 Base) MCG/ACT inhaler Inhale 2 puffs into the lungs every 6 hours as needed for wheezing or shortness of breath      alendronate (FOSAMAX) 70 MG tablet Take 1 tablet (70 mg) by mouth every 7 days 12 tablet 1    allopurinol (ZYLOPRIM) 100 MG tablet Take 1 tablet (100 mg) by  mouth daily 90 tablet 0    anakinra (KINERET) 100 MG/0.67ML SOSY injection 100 mg every day x 3 days as needed for flare ups 6.7 mL 3    aspirin (ASA) 81 MG EC tablet Take 1 tablet (81 mg) by mouth 2 times daily 60 tablet 0    atorvastatin (LIPITOR) 10 MG tablet TAKE 1/2 TABLET BY MOUTH EVERY DAY 45 tablet 3    azithromycin (ZITHROMAX) 250 MG tablet TAKE 1 TABLET BY MOUTH EVERY DAY 30 tablet 5    BD PEN NEEDLE JANE 2ND GEN 32G X 4 MM miscellaneous USE TO TEST 4 TIMES A  each 1    blood glucose (NO BRAND SPECIFIED) lancets standard Use to test blood sugar 3 times daily or as directed 100 lancet 3    BREO ELLIPTA 100-25 MCG/ACT inhaler TAKE 1 PUFF BY MOUTH EVERY DAY 1 each 11    cevimeline (EVOXAC) 30 MG capsule Take 1 capsule (30 mg) by mouth 3 times daily as needed      Cholecalciferol (VITAMIN D3) 50 MCG (2000 UT) CAPS TAKE 100 MCG BY MOUTH DAILY (TAKE 2 TABLET (50 MCG) BY MOUTH DAILY - ORAL) 180 capsule 0    COMPOUNDED NON-CONTROLLED SUBSTANCE (CMPD RX) - PHARMACY TO MIX COMPOUNDED MEDICATION Estriol 1 mg/g Apply small amount to finger and apply to inside vagina daily for 2 weeks then twice weekly Route: vaginally Dispense 30 grams 11 refills 30 g 11    cyanocobalamin (VITAMIN B-12) 1000 MCG tablet Take 1 tablet (1,000 mcg) by mouth three times a week      escitalopram (LEXAPRO) 20 MG tablet TAKE 1 TABLET BY MOUTH EVERY DAY 90 tablet 1    ferrous gluconate (FERGON) 324 (38 Fe) MG tablet Take 1 tablet (324 mg) by mouth three times a week      fluticasone (FLONASE) 50 MCG/ACT nasal spray SPRAY 1-2 SPRAYS INTO BOTH NOSTRILS DAILY AS NEEDED FOR ALLERGIES 16 mL 11    hydroxychloroquine (PLAQUENIL) 200 MG tablet Take 1 tablet (200 mg) by mouth daily Get annual eye exams for hydroxychloroquine (Plaquenil) monitoring and fax to [phone] 90 tablet 1    insulin glargine (LANTUS PEN) 100 UNIT/ML pen Inject 10 Units Subcutaneous At Bedtime 15 mL 1    insulin lispro (HUMALOG KWIKPEN) 100 UNIT/ML (1 unit dial)  KWIKPEN Inject Subcutaneous 3 times daily (before meals) Sliding scale insulin; tests BG three times day  If BG <150, no insulin given   If -200 take 2 units  If -250 take 4 units  If -300 take 6 units  If -350 take 10 units  If BG >350 take 14 units      ketoconazole (NIZORAL) 2 % external cream Apply topically 2 times daily as needed for itching 60 g 1    KLOR-CON 20 MEQ CR tablet TAKE 2 TABLETS (40 MEQ) BY MOUTH EVERY MORNING AND 1 TABLET (20 MEQ) EVERY EVENING. 270 tablet 3    lidocaine (LIDODERM) 5 % patch APPLY PATCH TO PAINFUL AREA FOR UP TO 12 H WITHIN A 24 H PERIOD. REMOVE AFTER 12 HOURS. (Patient taking differently: Apply patch to painful area for up to 12 h within a 24 h period.  Remove after 12 hours.) 30 patch 2    loratadine (CLARITIN) 10 MG tablet TAKE 1 TABLET BY MOUTH EVERY DAY 90 tablet 3    methocarbamol (ROBAXIN) 750 MG tablet Take 1 tablet (750 mg) by mouth 3 times daily as needed for muscle spasms 90 tablet 3    metoprolol succinate ER (TOPROL XL) 50 MG 24 hr tablet Take 1 tablet (50 mg) by mouth 2 times daily 180 tablet 3    montelukast (SINGULAIR) 10 MG tablet TAKE 1 TABLET BY MOUTH EVERYDAY AT BEDTIME 90 tablet 1    OZEMPIC, 1 MG/DOSE, 4 MG/3ML pen INJECT 1 MG SUBCUTANEOUS ONCE A WEEK 9 mL 1    pantoprazole (PROTONIX) 40 MG EC tablet Take 1 tablet (40 mg) by mouth daily (replaces famotidine- should stop taking famotidine) 90 tablet 3    polyethylene glycol (MIRALAX) 17 g packet Take 17 g by mouth daily 10 packet 0    predniSONE (DELTASONE) 10 MG tablet For gout flares, take the prednisone as 40-30-20-10 mg a day, each course x 3 days then go back to 5 mg a day 30 tablet 3    predniSONE (DELTASONE) 5 MG tablet Take 1 tablet (5 mg) by mouth daily 90 tablet 1    torsemide (DEMADEX) 20 MG tablet Take 2 tablets (40 mg) by mouth every morning AND 1 tablet (20 mg) every evening. 270 tablet 3    traZODone (DESYREL) 50 MG tablet Take 1-2 tablets ( mg) by mouth At Bedtime  "        ROS:   Constitutional: No fever, chills.  Generally losing weight over the last year and strength  ENT: eye hematoma ear ache, epistaxis, sore throat.   Allergies/Immunologic: Negative.   Respiratory: + intermittent cough, no hemoptysis.   Cardiovascular: As per HPI.   GI: No nausea, vomiting, stools have not been particularly dark  : No urinary frequency, dysuria, or hematuria.   Integument: Negative.   Psychiatric: Frustrated about how much pain she is in-not sleeping well  Neuro: Negative.   Endocrinology: Negative.   Musculoskeletal: R hip is better since surgery + gout     EXAM:  BP (!) 145/80 (BP Location: Right arm, Patient Position: Chair, Cuff Size: Adult Regular)   Pulse 59   Ht 1.676 m (5' 5.98\")   Wt 79.7 kg (175 lb 9.6 oz)   LMP  (LMP Unknown)   SpO2 96%   BMI 28.36 kg/m    General: appears comfortable, alert and articulate, with walker   Head: normocephalic, atraumatic  Neck: no adenopathy  Orophyarynx: moist mucosa, no lesions, dentition intact  Heart: regular, S1/S2, no murmur, gallop, rub, estimated JVP <10   Lungs: diffuse expiratory wheezing, no crackles,no dullness   Abdomen: soft, non-tender, bowel sounds present, no hepatosplenomegaly  Extremities: trace LE edema, no clubbing, cyanosis.-Thin upper thighs  Neurological: normal speech and affect, no gross motor deficits      Data   Regadenosen MPI 2014: no fixed or inducible defects      Last EKG from 3/7/2023 is sinus rhythm with old anterior infarct pattern      Echo from today final read pending but ejection fraction visually 55 to 60%  Normal IVC  No significant valve disease    Laboratory data personally reviewed     Latest Reference Range & Units 09/07/23 12:39   Sodium 136 - 145 mmol/L 138   Potassium 3.4 - 5.3 mmol/L 3.9   Chloride 98 - 107 mmol/L 100   Carbon Dioxide (CO2) 22 - 29 mmol/L 25   Urea Nitrogen 8.0 - 23.0 mg/dL 12.1   Creatinine 0.51 - 0.95 mg/dL 0.91   GFR Estimate >60 mL/min/1.73m2 62   Calcium 8.8 - 10.2 " mg/dL 9.1   Anion Gap 7 - 15 mmol/L 13         Assessment and Plan:  In summary this is a very pleasant 83  F with HFpEF who is here for follow up.     In support of the diagnosis of HFpEF includes mild LA enlargement, echocardiographic signs of increase LV filling pressures, increased nt-bnp, as well as a diuretic requirement and symptomatic improvement on diuretics.    The risk factors for HFpEF in her include age, gender, HTN, diabetes, sleep apnea and obesity.  A prior stress test was negative for CAD. She is presently euvolemic on exam and is NYHA class II(mutifactorial).    The mainstays of therapy for HFpEF include volume management, blood pressure control, treating underlying sleep apnea if present, weight management, exercise training, rate control for atrial fibrillation as well as consideration for a rhythm control strategy, as well as consideration for clinical trials.     HFpEF: She is NYHA class II, stage C, euvolmeci today   Given weight loss, low blood pressures etc. over the last 6 to 8 months we stopped her spironolactone  She is looking so well today-euvolemic blood pressure controlled NT proBNP quite low that I would rather not add more medications at this point. \  In the future we could consider adding back Aldactone and SGL 2 inhibitor    Atrial fibrillation [id number] (age >75, HTN, CHF, gender) -paroxysmal last-EKG  showing sinus rhythm  S/p watchman 9/22   On asprin 81 mg daily     Primary prevention: on statin, on ASA  -I do not want to lower her dose of statin from here as she is on such a low-dose already    Frailty-improved encouragement provided    For now she will focus on regaining her strength and treating her gout with her rheumatologist      Return to clinic in 6 months with labs sooner as needed      Radha Rosa MD  Associate Professor of Medicine   [facility] Division of Cardiology

## 2023-09-18 NOTE — TELEPHONE ENCOUNTER
Called Nghia yang. Seen in clinic 4/13/23. Torsemide reduced at that time to 20 mg twice a day and Nghia confirms this is current dose. Was doing 40 in AM, 30 in PM.  Up 8 lbs (was up a couple lbs yesterday and now today is up more) and having more ankle swelling in last couple days. No worsening shortness of breath.     Will review with provider.      Pinch Graft Text: The defect edges were debeveled with a #15 scalpel blade. Given the location of the defect, shape of the defect and the proximity to free margins a pinch graft was deemed most appropriate. Using a sterile surgical marker, the primary defect shape was transferred to the donor site. The area thus outlined was incised deep to adipose tissue with a #15 scalpel blade.  The harvested graft was then trimmed of adipose tissue until only dermis and epidermis was left. The skin margins of the secondary defect were undermined to an appropriate distance in all directions utilizing iris scissors.  The secondary defect was closed with interrupted buried subcutaneous sutures.  The skin edges were then re-apposed with running  sutures.  The skin graft was then placed in the primary defect and oriented appropriately.

## 2023-09-26 ENCOUNTER — TELEPHONE (OUTPATIENT)
Dept: RHEUMATOLOGY | Facility: CLINIC | Age: 83
End: 2023-09-26
Payer: MEDICARE

## 2023-09-26 DIAGNOSIS — M10.9 GOUT, UNSPECIFIED CAUSE, UNSPECIFIED CHRONICITY, UNSPECIFIED SITE: Primary | ICD-10-CM

## 2023-09-26 RX ORDER — ANAKINRA 100 MG/.67ML
100 INJECTION, SOLUTION SUBCUTANEOUS DAILY
Qty: 20.1 ML | Refills: 3 | Status: SHIPPED | OUTPATIENT
Start: 2023-09-26

## 2023-09-26 RX ORDER — ANAKINRA 100 MG/.67ML
INJECTION, SOLUTION SUBCUTANEOUS
Refills: 3 | OUTPATIENT
Start: 2023-09-26

## 2023-09-26 NOTE — TELEPHONE ENCOUNTER
We received rx for kineret with different sig than past rx's. The new sig is missing 3 days for flares. Please verify this change. Thanks

## 2023-09-26 NOTE — TELEPHONE ENCOUNTER
Per Dr. Lopez-had to prescribe her anakinra daily to have enough supply on hand. She still would use it as instructed every day x 3 days prn flares.       NORMA ConleyN, RN  RN Care Coordinator Rheumatology

## 2023-09-29 DIAGNOSIS — F33.1 MAJOR DEPRESSIVE DISORDER, RECURRENT EPISODE, MODERATE (H): ICD-10-CM

## 2023-09-29 DIAGNOSIS — I50.30 (HFPEF) HEART FAILURE WITH PRESERVED EJECTION FRACTION (H): ICD-10-CM

## 2023-09-29 RX ORDER — ESCITALOPRAM OXALATE 20 MG/1
TABLET ORAL
Qty: 90 TABLET | Refills: 1 | Status: SHIPPED | OUTPATIENT
Start: 2023-09-29 | End: 2024-05-06

## 2023-10-02 RX ORDER — POTASSIUM CHLORIDE 1500 MG/1
TABLET, EXTENDED RELEASE ORAL
Qty: 270 TABLET | Refills: 3 | Status: SHIPPED | OUTPATIENT
Start: 2023-10-02

## 2023-10-02 NOTE — TELEPHONE ENCOUNTER
Last Clinic Visit: 9/7/2023 [facility]      Potassium   Date Value Ref Range Status   09/07/2023 3.9 3.4 - 5.3 mmol/L Final   08/23/2022 4.1 3.4 - 5.3 mmol/L Final   06/04/2021 4.0 3.4 - 5.3 mmol/L Final

## 2023-10-03 ENCOUNTER — DOCUMENTATION ONLY (OUTPATIENT)
Dept: FAMILY MEDICINE | Facility: CLINIC | Age: 83
End: 2023-10-03
Payer: MEDICARE

## 2023-10-03 DIAGNOSIS — E11.65 TYPE 2 DIABETES MELLITUS WITH HYPERGLYCEMIA, WITH LONG-TERM CURRENT USE OF INSULIN (H): ICD-10-CM

## 2023-10-03 DIAGNOSIS — Z79.4 TYPE 2 DIABETES MELLITUS WITH HYPERGLYCEMIA, WITH LONG-TERM CURRENT USE OF INSULIN (H): ICD-10-CM

## 2023-10-03 DIAGNOSIS — I10 ESSENTIAL HYPERTENSION WITH GOAL BLOOD PRESSURE LESS THAN 140/90: Primary | ICD-10-CM

## 2023-10-03 DIAGNOSIS — N18.31 STAGE 3A CHRONIC KIDNEY DISEASE (H): ICD-10-CM

## 2023-10-04 ENCOUNTER — LAB (OUTPATIENT)
Dept: LAB | Facility: CLINIC | Age: 83
End: 2023-10-04
Payer: MEDICARE

## 2023-10-04 DIAGNOSIS — Z79.4 TYPE 2 DIABETES MELLITUS WITH HYPERGLYCEMIA, WITH LONG-TERM CURRENT USE OF INSULIN (H): ICD-10-CM

## 2023-10-04 DIAGNOSIS — E11.65 TYPE 2 DIABETES MELLITUS WITH HYPERGLYCEMIA, WITH LONG-TERM CURRENT USE OF INSULIN (H): ICD-10-CM

## 2023-10-04 LAB — HBA1C MFR BLD: 9.6 %

## 2023-10-04 PROCEDURE — 99000 SPECIMEN HANDLING OFFICE-LAB: CPT | Performed by: PATHOLOGY

## 2023-10-04 PROCEDURE — 83036 HEMOGLOBIN GLYCOSYLATED A1C: CPT | Performed by: FAMILY MEDICINE

## 2023-10-04 PROCEDURE — 36415 COLL VENOUS BLD VENIPUNCTURE: CPT | Performed by: PATHOLOGY

## 2023-10-06 ENCOUNTER — OFFICE VISIT (OUTPATIENT)
Dept: FAMILY MEDICINE | Facility: CLINIC | Age: 83
End: 2023-10-06
Payer: MEDICARE

## 2023-10-06 VITALS
BODY MASS INDEX: 28.14 KG/M2 | WEIGHT: 175.1 LBS | DIASTOLIC BLOOD PRESSURE: 60 MMHG | TEMPERATURE: 97.2 F | HEIGHT: 66 IN | RESPIRATION RATE: 16 BRPM | HEART RATE: 51 BPM | SYSTOLIC BLOOD PRESSURE: 122 MMHG | OXYGEN SATURATION: 98 %

## 2023-10-06 DIAGNOSIS — Z23 ENCOUNTER FOR IMMUNIZATION: ICD-10-CM

## 2023-10-06 DIAGNOSIS — F33.1 MAJOR DEPRESSIVE DISORDER, RECURRENT EPISODE, MODERATE (H): ICD-10-CM

## 2023-10-06 DIAGNOSIS — M25.641 STIFFNESS OF FINGER JOINT, RIGHT: ICD-10-CM

## 2023-10-06 DIAGNOSIS — M35.02 SJOGREN'S SYNDROME WITH LUNG INVOLVEMENT (H): ICD-10-CM

## 2023-10-06 DIAGNOSIS — M06.00 SERONEGATIVE RHEUMATOID ARTHRITIS (H): ICD-10-CM

## 2023-10-06 DIAGNOSIS — E11.65 TYPE 2 DIABETES MELLITUS WITH HYPERGLYCEMIA, WITH LONG-TERM CURRENT USE OF INSULIN (H): Primary | ICD-10-CM

## 2023-10-06 DIAGNOSIS — M10.9 GOUT, UNSPECIFIED CAUSE, UNSPECIFIED CHRONICITY, UNSPECIFIED SITE: ICD-10-CM

## 2023-10-06 DIAGNOSIS — Z79.4 TYPE 2 DIABETES MELLITUS WITH HYPERGLYCEMIA, WITH LONG-TERM CURRENT USE OF INSULIN (H): Primary | ICD-10-CM

## 2023-10-06 DIAGNOSIS — J30.1 SEASONAL ALLERGIC RHINITIS DUE TO POLLEN: ICD-10-CM

## 2023-10-06 DIAGNOSIS — D50.9 IRON DEFICIENCY ANEMIA, UNSPECIFIED IRON DEFICIENCY ANEMIA TYPE: ICD-10-CM

## 2023-10-06 PROCEDURE — 99215 OFFICE O/P EST HI 40 MIN: CPT | Mod: 25 | Performed by: FAMILY MEDICINE

## 2023-10-06 PROCEDURE — 90662 IIV NO PRSV INCREASED AG IM: CPT | Performed by: FAMILY MEDICINE

## 2023-10-06 PROCEDURE — G0008 ADMIN INFLUENZA VIRUS VAC: HCPCS | Performed by: FAMILY MEDICINE

## 2023-10-06 RX ORDER — LEVOCETIRIZINE DIHYDROCHLORIDE 5 MG/1
5 TABLET, FILM COATED ORAL EVERY EVENING
Qty: 90 TABLET | Refills: 1 | Status: SHIPPED | OUTPATIENT
Start: 2023-10-06 | End: 2024-03-18

## 2023-10-06 ASSESSMENT — ANXIETY QUESTIONNAIRES
1. FEELING NERVOUS, ANXIOUS, OR ON EDGE: SEVERAL DAYS
6. BECOMING EASILY ANNOYED OR IRRITABLE: SEVERAL DAYS
4. TROUBLE RELAXING: NOT AT ALL
IF YOU CHECKED OFF ANY PROBLEMS ON THIS QUESTIONNAIRE, HOW DIFFICULT HAVE THESE PROBLEMS MADE IT FOR YOU TO DO YOUR WORK, TAKE CARE OF THINGS AT HOME, OR GET ALONG WITH OTHER PEOPLE: NOT DIFFICULT AT ALL
3. WORRYING TOO MUCH ABOUT DIFFERENT THINGS: SEVERAL DAYS
GAD7 TOTAL SCORE: 3
2. NOT BEING ABLE TO STOP OR CONTROL WORRYING: NOT AT ALL
7. FEELING AFRAID AS IF SOMETHING AWFUL MIGHT HAPPEN: NOT AT ALL
GAD7 TOTAL SCORE: 3
5. BEING SO RESTLESS THAT IT IS HARD TO SIT STILL: NOT AT ALL

## 2023-10-06 ASSESSMENT — PAIN SCALES - GENERAL: PAINLEVEL: NO PAIN (0)

## 2023-10-06 ASSESSMENT — PATIENT HEALTH QUESTIONNAIRE - PHQ9
SUM OF ALL RESPONSES TO PHQ QUESTIONS 1-9: 5
10. IF YOU CHECKED OFF ANY PROBLEMS, HOW DIFFICULT HAVE THESE PROBLEMS MADE IT FOR YOU TO DO YOUR WORK, TAKE CARE OF THINGS AT HOME, OR GET ALONG WITH OTHER PEOPLE: SOMEWHAT DIFFICULT
SUM OF ALL RESPONSES TO PHQ QUESTIONS 1-9: 5

## 2023-10-06 NOTE — Clinical Note
Please schedule pt for 1hr appt on 1/19/23 (F) at 11am. For DM II, anemia, chronic cares.  She'll do labs the week prior (at [facility]). Thank you! CW

## 2023-10-06 NOTE — PROGRESS NOTES
Assessment & Plan     Type 2 diabetes mellitus with hyperglycemia, with long-term current use of insulin (H)  A1C much higher today, likely due to prednisone course for gout in 7/23, and persistent gout flares txt otherwise lately may have increased general inflammation.  Lantus increase has helped some.  Unsure if ozempic is maxed for her or not.  Will continue follow-up with MTM.  Follow-up here with A1C in ~3 months.  - **Hemoglobin A1c FUTURE 3mo; Future    Major depressive disorder, recurrent episode, moderate (H)  Mood/anxiety symptoms under good control with current meds, and likely much better with less pain after hip surgery.  Will continue current meds and continue every six month follow-up.     Gout, unspecified cause, unspecified chronicity, unspecified site  Significant recurrent sx's in hands/arms lately, very bothersome. Now on kineret injections for flares, with prednisone on hand for back-up if the injections are not helping. She is on allopurinol but just at 100mg/d dosing- wonder if higher dose would help lessen recurrences. Pt will discuss with Dr. Lopez.    Iron deficiency anemia, unspecified iron deficiency anemia type  Pt still on 3d/wk iron- will try stopping and recheck CBC/ferritin in ~3 months.  - **CBC with platelets differential FUTURE 2mo; Future  - Ferritin; Future    Stiffness of finger joint, right  Difficulty bending and holding things with R 2nd finger.  Referred to Dr. Krause- pt unsure if she's ready to go yet or not - so many specialists/issues- will consider.  - Orthopedic  Referral; Future    Seasonal allergic rhinitis due to pollen  Pt reports very bothersome allergy sx's despite daily claritin, flonase and singulair.  Rec switching from claritin to xyzal (in pm).  If still bothersome, could add back am claritin.  - levocetirizine (XYZAL) 5 MG tablet; Take 1 tablet (5 mg) by mouth every evening    Encounter for immunization  Will do flu shot today (out of COVID)-  "Risks/benefits discussed, given today.   - INFLUENZA VACCINE 65+ (FLUZONE HD)    Sjogren's/Rheumatoid arthritis- continue follow-up with Dr. Lopez.      Return in about 15 weeks (around 1/19/2024) for Diabetes, Depression, Gout, Chronic cares/Med check.      I spent a total of 60 minutes on the day of the visit.   Time spent by me doing chart review, history and exam, documentation and further activities per the note       BMI:   Estimated body mass index is 28.26 kg/m  as calculated from the following:    Height as of this encounter: 1.676 m (5' 6\").    Weight as of this encounter: 79.4 kg (175 lb 1.6 oz).     Blood sugar testing frequency justification:  Uncontrolled diabetes    Ilene Tristan MD  [facility]              Daniel Hernández is a 83 year old, presenting for the following health issues:  Diabetes        10/6/2023    11:05 AM   Additional Questions   Roomed by JASON Olson   Accompanied by friend - Nghia       History of Present Illness       Diabetes:   She presents for follow up of diabetes.  She is checking home blood glucose two times daily.   She checks blood glucose before meals.  Blood glucose is sometimes over 200 and never under 70. She is aware of hypoglycemia symptoms including weakness and lethargy.   She is concerned about other.   She is having numbness in feet and excessive thirst.            Heart Failure:  She presents for follow up of heart failure. She is experiencing shortness of breath with activity only, which is same as usual. She is experiencing lower extremity edema which is same as usual.   She denies orthopenea and is not coughing at night. Patient is checking weight daily. She has recently had a weight increase.  She has no side effects from medications.  She has had no other medical visits for heart failure since the last visit.    She eats 2-3 servings of fruits and vegetables daily.She consumes 0 sweetened beverage(s) daily.She exercises with " enough effort to increase her heart rate 20 to 29 minutes per day.  She exercises with enough effort to increase her heart rate 3 or less days per week.   She is taking medications regularly.     DM II - A1c is now up at 9.6 - up from 7's in 3/23, and low of 5.5 in 11/22.  Continues on ozempic.  Had to go up to 20 units on the lantus.  Continues on sliding scale humalog.    AM Glucose- 160-170s  If she eats anything it goes up.  Supper- >200.    Worse with the prednisone for gout- course done by end of July, then back to 5mg/d.    Toes- feel crunchy and crinkly - all the toes of both feet.  Doesn't bother her during day, but at night, doesn't like the covers on them.      MDD- sx's, feels they are pretty good on current meds, no se's.      Gout- bouts keep coming, hit her hand, and elbow then shoulder.  It's hit her hand, knee and foot.  Extremely painful.  Dr. Lopez- ordered a shot, and she takes it for three days.  With one shot, knows the excruciating pain will be better.  At her cardiology visit on 9/7/23, they talked about how gout can be put into remission forever, to talk to Dr. Lopez.  Possibly allopurinol dose increase?  Reviewed uric acid levels- last 6.2 in 1/23, in 7's prior.  But with these worsening episodes, wonder if dose could be increased.      Has had some bouts of urinary frequency and difficulty getting to the bathroom.  Started using the medicine- from Dr. Nassar  Has been doing the vaginal inserts- more consistently - after the two bad incidents last week.  Doing it daily now.  Then will go to 2x/wk.  7/21 last appt- had rec 6 months.   Rec follow-up in 3 months after steady use of estriol.        Allergies-   Head is so full, sneezes and coughs, blowing her nose- summer and fall   Taking claritin and singulair and fluticasone  Will try switch to xyzal      Hand specialist- 2nd finger - hard to bend    Hair loss  Iron- still on 3d/wk.  Will try stopping and recheck CBC/ferritin in 3  "months.      Review of Systems   Constitutional, HEENT, cardiovascular, pulmonary, GI, , musculoskeletal, neuro, skin, endocrine and psych systems are negative, except as otherwise noted.        Objective    /60 (BP Location: Left arm, Patient Position: Sitting, Cuff Size: Adult Large)   Pulse 51   Temp 97.2  F (36.2  C) (Temporal)   Resp 16   Ht 1.676 m (5' 6\")   Wt 79.4 kg (175 lb 1.6 oz)   LMP  (LMP Unknown)   SpO2 98%   BMI 28.26 kg/m    Body mass index is 28.26 kg/m .  Physical Exam   GENERAL: healthy, alert and no distress  EYES: Eyes grossly normal to inspection, PERRL and conjunctivae and sclerae normal  NECK: no adenopathy, no asymmetry, masses, or scars and thyroid normal to palpation  RESP: lungs clear to auscultation - no rales, rhonchi or wheezes  CV: regular rate and rhythm, normal S1 S2, no S3 or S4, no murmur, click or rub, no peripheral edema and peripheral pulses strong  MS: no gross musculoskeletal defects noted, no edema  SKIN: no suspicious lesions or rashes  PSYCH: mentation appears normal, affect normal/bright    Lab on 10/04/2023   Component Date Value Ref Range Status    Hemoglobin A1C 10/04/2023 9.6 (H)  <5.7 % Final    Normal <5.7%   Prediabetes 5.7-6.4%    Diabetes 6.5% or higher     Note: Adopted from ADA consensus guidelines.           Answers submitted by the patient for this visit:  Patient Health Questionnaire (Submitted on 10/6/2023)  If you checked off any problems, how difficult have these problems made it for you to do your work, take care of things at home, or get along with other people?: Somewhat difficult  PHQ9 TOTAL SCORE: 5  DELORES-7 (Submitted on 10/6/2023)  DELORES 7 TOTAL SCORE: 3    "

## 2023-10-06 NOTE — NURSING NOTE
Prior to immunization administration, verified patients identity using patient s name and date of birth. Please see Immunization Activity for additional information.     Screening Questionnaire for Adult Immunization    Are you sick today?   No   Do you have allergies to medications, food, a vaccine component or latex?   No   Have you ever had a serious reaction after receiving a vaccination?   No   Do you have a long-term health problem with heart, lung, kidney, or metabolic disease (e.g., diabetes), asthma, a blood disorder, no spleen, complement component deficiency, a cochlear implant, or a spinal fluid leak?  Are you on long-term aspirin therapy?   No   Do you have cancer, leukemia, HIV/AIDS, or any other immune system problem?   No   Do you have a parent, brother, or sister with an immune system problem?   No   In the past 3 months, have you taken medications that affect  your immune system, such as prednisone, other steroids, or anticancer drugs; drugs for the treatment of rheumatoid arthritis, Crohn s disease, or psoriasis; or have you had radiation treatments?   No   Have you had a seizure, or a brain or other nervous system problem?   No   During the past year, have you received a transfusion of blood or blood    products, or been given immune (gamma) globulin or antiviral drug?   No   For women: Are you pregnant or is there a chance you could become       pregnant during the next month?   No   Have you received any vaccinations in the past 4 weeks?   No     Immunization questionnaire answers were all negative.      Patient instructed to remain in clinic for 15 minutes afterwards, and to report any adverse reactions.     Screening performed by Jerman Velasquez MA on 10/6/2023 at 12:24 PM.

## 2023-10-11 ENCOUNTER — ANCILLARY PROCEDURE (OUTPATIENT)
Dept: GENERAL RADIOLOGY | Facility: CLINIC | Age: 83
End: 2023-10-11
Attending: EMERGENCY MEDICINE
Payer: MEDICARE

## 2023-10-11 ENCOUNTER — OFFICE VISIT (OUTPATIENT)
Dept: URGENT CARE | Facility: URGENT CARE | Age: 83
End: 2023-10-11
Payer: MEDICARE

## 2023-10-11 VITALS
DIASTOLIC BLOOD PRESSURE: 80 MMHG | HEART RATE: 63 BPM | HEIGHT: 66 IN | SYSTOLIC BLOOD PRESSURE: 122 MMHG | WEIGHT: 175 LBS | TEMPERATURE: 98.2 F | RESPIRATION RATE: 18 BRPM | OXYGEN SATURATION: 96 % | BODY MASS INDEX: 28.12 KG/M2

## 2023-10-11 DIAGNOSIS — J06.9 UPPER RESPIRATORY TRACT INFECTION, UNSPECIFIED TYPE: Primary | ICD-10-CM

## 2023-10-11 DIAGNOSIS — J06.9 UPPER RESPIRATORY TRACT INFECTION, UNSPECIFIED TYPE: ICD-10-CM

## 2023-10-11 LAB
ANION GAP SERPL CALCULATED.3IONS-SCNC: 9 MMOL/L (ref 7–15)
BASO+EOS+MONOS # BLD AUTO: ABNORMAL 10*3/UL
BASO+EOS+MONOS NFR BLD AUTO: ABNORMAL %
BASOPHILS # BLD AUTO: 0 10E3/UL (ref 0–0.2)
BASOPHILS NFR BLD AUTO: 0 %
BUN SERPL-MCNC: 9.9 MG/DL (ref 8–23)
CALCIUM SERPL-MCNC: 8.8 MG/DL (ref 8.8–10.2)
CHLORIDE SERPL-SCNC: 98 MMOL/L (ref 98–107)
CREAT SERPL-MCNC: 0.86 MG/DL (ref 0.51–0.95)
DEPRECATED HCO3 PLAS-SCNC: 28 MMOL/L (ref 22–29)
EGFRCR SERPLBLD CKD-EPI 2021: 67 ML/MIN/1.73M2
EOSINOPHIL # BLD AUTO: 0.1 10E3/UL (ref 0–0.7)
EOSINOPHIL NFR BLD AUTO: 1 %
ERYTHROCYTE [DISTWIDTH] IN BLOOD BY AUTOMATED COUNT: 14.5 % (ref 10–15)
GLUCOSE SERPL-MCNC: 208 MG/DL (ref 70–99)
HCT VFR BLD AUTO: 38.9 % (ref 35–47)
HGB BLD-MCNC: 12.7 G/DL (ref 11.7–15.7)
IMM GRANULOCYTES # BLD: 0 10E3/UL
IMM GRANULOCYTES NFR BLD: 1 %
LYMPHOCYTES # BLD AUTO: 0.5 10E3/UL (ref 0.8–5.3)
LYMPHOCYTES NFR BLD AUTO: 7 %
MCH RBC QN AUTO: 29.6 PG (ref 26.5–33)
MCHC RBC AUTO-ENTMCNC: 32.6 G/DL (ref 31.5–36.5)
MCV RBC AUTO: 91 FL (ref 78–100)
MONOCYTES # BLD AUTO: 0.9 10E3/UL (ref 0–1.3)
MONOCYTES NFR BLD AUTO: 12 %
NEUTROPHILS # BLD AUTO: 5.4 10E3/UL (ref 1.6–8.3)
NEUTROPHILS NFR BLD AUTO: 79 %
NT-PROBNP SERPL-MCNC: 1422 PG/ML (ref 0–1800)
PLATELET # BLD AUTO: 177 10E3/UL (ref 150–450)
POTASSIUM SERPL-SCNC: 3.8 MMOL/L (ref 3.4–5.3)
RBC # BLD AUTO: 4.29 10E6/UL (ref 3.8–5.2)
SARS-COV-2 RNA RESP QL NAA+PROBE: NEGATIVE
SODIUM SERPL-SCNC: 135 MMOL/L (ref 135–145)
WBC # BLD AUTO: 6.9 10E3/UL (ref 4–11)

## 2023-10-11 PROCEDURE — 71046 X-RAY EXAM CHEST 2 VIEWS: CPT | Mod: TC | Performed by: STUDENT IN AN ORGANIZED HEALTH CARE EDUCATION/TRAINING PROGRAM

## 2023-10-11 PROCEDURE — 85025 COMPLETE CBC W/AUTO DIFF WBC: CPT | Performed by: EMERGENCY MEDICINE

## 2023-10-11 PROCEDURE — 80048 BASIC METABOLIC PNL TOTAL CA: CPT | Performed by: EMERGENCY MEDICINE

## 2023-10-11 PROCEDURE — 99214 OFFICE O/P EST MOD 30 MIN: CPT | Performed by: EMERGENCY MEDICINE

## 2023-10-11 PROCEDURE — 36415 COLL VENOUS BLD VENIPUNCTURE: CPT | Performed by: EMERGENCY MEDICINE

## 2023-10-11 PROCEDURE — 87635 SARS-COV-2 COVID-19 AMP PRB: CPT | Performed by: EMERGENCY MEDICINE

## 2023-10-11 PROCEDURE — 83880 ASSAY OF NATRIURETIC PEPTIDE: CPT | Performed by: EMERGENCY MEDICINE

## 2023-10-11 RX ORDER — BENZONATATE 100 MG/1
100 CAPSULE ORAL 3 TIMES DAILY PRN
Qty: 25 CAPSULE | Refills: 0 | Status: SHIPPED | OUTPATIENT
Start: 2023-10-11 | End: 2023-12-07

## 2023-10-11 NOTE — PROGRESS NOTES
Assessment & Plan     Diagnosis:    ICD-10-CM    1. Upper respiratory tract infection, unspecified type  J06.9 XR Chest 2 Views     Basic metabolic panel  (Ca, Cl, CO2, Creat, Gluc, K, Na, BUN)     BNP-N terminal pro     CBC with platelets and differential     Symptomatic COVID-19 Virus (Coronavirus) by PCR Nose     benzonatate (TESSALON) 100 MG capsule     Basic metabolic panel  (Ca, Cl, CO2, Creat, Gluc, K, Na, BUN)     BNP-N terminal pro     CBC with platelets and differential          Medical Decision Making:  Betty Tee is a 83 year old female who presents for evaluation of cough, slight shortness of breath, fevers last night. Patient is feeling improved today. Symptoms are consistent with an upper respiratory tract infection.  There is no signs at this point of serious bacterial infection such as RPA, epiglottitis, PTA, sinusitis, meningitis, serious bacterial infection.      Given history of cough with fever, I did a CXR to eval for pneumonia. This shows no acute infiltrates on my read; radiology notes as per below. She is feeling improved since yesterday but continues to feel fatigued. BMP shows hyperglycemia -- non-fasting; otherwise is WNL. BNP is WNL based on patient's age; elevated from last month but no pleural effusion or pedal edema noted today. No signs of acute CHF exacerbation or ACS. There are no signs of pneumonia, hypoxia, respiratory failure or compromise.     There are no gastrointestinal symptoms at this point and no signs of dehydration.  Close followup with PCP is indicated for recheck in 2 days. COVID test pending.  Go to ED for fever > 102 F, protracted vomiting, worsening shortness of breath, leg swelling, chest pain or other concerns.  Patient verbalized understanding and agreement with the plan. Patient was discharged from clinic in stable condition.      Marshall North PA-C  [facility]    Subjective     Betty Tee is a 83 year old female who presents  "to clinic today for the following health issues:  Chief Complaint   Patient presents with    Urgent Care    URI     Chest congestion, feeling fatigue and coughing but last night much worse.        HPI  Patient reports that for the past 3 days she has been experiencing chest congestion, fatigue, coughing.  Symptoms worsened last night she is having low-grade fevers.  She does feel improved today, but continues to feel tired and have intermittent cough.  She notes no more fevers today has not taken any Tylenol or ibuprofen.  He denies any chest pain, leg swelling, lightheadedness, dizziness, shortness of breath at rest or with minimal exertion.  He does have history of atrial fibrillation, has watchman in place.  Also has history of CHF, but has not noticed increased weight gain or leg swelling.      Review of Systems    See HPI    Objective      Vitals: /80   Pulse 63   Temp 98.2  F (36.8  C) (Temporal)   Resp 18   Ht 1.676 m (5' 6\")   Wt 79.4 kg (175 lb)   LMP  (LMP Unknown)   SpO2 96%   BMI 28.25 kg/m        Patient Vitals for the past 24 hrs:   BP Temp Temp src Pulse Resp SpO2 Height Weight   10/11/23 1315 122/80 98.2  F (36.8  C) Temporal 63 18 96 % 1.676 m (5' 6\") 79.4 kg (175 lb)       Vital signs reviewed by: Marshall North PA-C    Physical Exam   Constitutional: Alert and active.   Non-toxic appearing.  No acute distress.  HENT:   Eyes: Conjunctivae, EOM and lids are normal.   Mouth: Mucous membranes are moist. Posterior oropharynx is clear. No exudates. Normal tongue and tonsil. Uvula is midline. No submandibular swelling or erythema.  Neck: Normal ROM. No meningismus  Cardiovascular: Regular rate and rhythm  Pulmonary/Chest: Effort normal. No respiratory distress. Lungs with slightly diminished breath sounds at the left base. No crackles, wheezes or rhonchi.   GI: Abdomen is soft and non-tender throughout.   Musculoskeletal: Normal range of motion. No lower extremity tenderness or " edema.  Neurological: Alert and oriented x3.  Skin: No rash noted on visualized skin.       Labs/Imaging:  Results for orders placed or performed in visit on 10/11/23   XR Chest 2 Views     Status: None    Narrative    CHEST TWO VIEWS  10/11/2023 1:51 PM     HISTORY: Upper respiratory tract infection, unspecified type.    COMPARISON: Chest radiograph 3/7/2023. CT chest 11/22/2023.      Impression    IMPRESSION: No significant interval change. No focal consolidation,  pleural effusion or pneumothorax. Similar mildly enlarged  cardiomediastinal silhouette. Mild degenerative changes of the  thoracic spine and mild height loss of a midthoracic vertebral body.    BECKIE CAZARES MD         SYSTEM ID:  [id number]   Results for orders placed or performed in visit on 10/11/23   Basic metabolic panel  (Ca, Cl, CO2, Creat, Gluc, K, Na, BUN)     Status: Abnormal   Result Value Ref Range    Sodium 135 135 - 145 mmol/L    Potassium 3.8 3.4 - 5.3 mmol/L    Chloride 98 98 - 107 mmol/L    Carbon Dioxide (CO2) 28 22 - 29 mmol/L    Anion Gap 9 7 - 15 mmol/L    Urea Nitrogen 9.9 8.0 - 23.0 mg/dL    Creatinine 0.86 0.51 - 0.95 mg/dL    GFR Estimate 67 >60 mL/min/1.73m2    Calcium 8.8 8.8 - 10.2 mg/dL    Glucose 208 (H) 70 - 99 mg/dL   BNP-N terminal pro     Status: Normal   Result Value Ref Range    N Terminal Pro BNP Outpatient 1,422 0 - 1,800 pg/mL   CBC with platelets and differential     Status: Abnormal   Result Value Ref Range    WBC Count 6.9 4.0 - 11.0 10e3/uL    RBC Count 4.29 3.80 - 5.20 10e6/uL    Hemoglobin 12.7 11.7 - 15.7 g/dL    Hematocrit 38.9 35.0 - 47.0 %    MCV 91 78 - 100 fL    MCH 29.6 26.5 - 33.0 pg    MCHC 32.6 31.5 - 36.5 g/dL    RDW 14.5 10.0 - 15.0 %    Platelet Count 177 150 - 450 10e3/uL    % Neutrophils 79 %    % Lymphocytes 7 %    % Monocytes 12 %    Mids % (Monos, Eos, Basos)      % Eosinophils 1 %    % Basophils 0 %    % Immature Granulocytes 1 %    Absolute Neutrophils 5.4 1.6 - 8.3 10e3/uL     Absolute Lymphocytes 0.5 (L) 0.8 - 5.3 10e3/uL    Absolute Monocytes 0.9 0.0 - 1.3 10e3/uL    Mids Abs (Monos, Eos, Basos)      Absolute Eosinophils 0.1 0.0 - 0.7 10e3/uL    Absolute Basophils 0.0 0.0 - 0.2 10e3/uL    Absolute Immature Granulocytes 0.0 <=0.4 10e3/uL   CBC with platelets and differential     Status: Abnormal    Narrative    The following orders were created for panel order CBC with platelets and differential.  Procedure                               Abnormality         Status                     ---------                               -----------         ------                     CBC with platelets and d...[[id number]]  Abnormal            Final result                 Please view results for these tests on the individual orders.     Reading per radiology      Marshall North PA-C, October 11, 2023

## 2023-10-15 DIAGNOSIS — M10.9 ACUTE GOUT OF FOOT, UNSPECIFIED CAUSE, UNSPECIFIED LATERALITY: ICD-10-CM

## 2023-10-16 PROBLEM — M06.00 SERONEGATIVE RHEUMATOID ARTHRITIS (H): Status: ACTIVE | Noted: 2023-10-16

## 2023-10-17 ENCOUNTER — TELEPHONE (OUTPATIENT)
Dept: FAMILY MEDICINE | Facility: CLINIC | Age: 83
End: 2023-10-17
Payer: MEDICARE

## 2023-10-17 RX ORDER — ALLOPURINOL 100 MG/1
100 TABLET ORAL DAILY
Qty: 90 TABLET | Refills: 1 | Status: SHIPPED | OUTPATIENT
Start: 2023-10-17 | End: 2024-07-08

## 2023-10-17 NOTE — TELEPHONE ENCOUNTER
She does NOT need antibiotics.  Adding UpToDate information below.  She has multiple medical issues, but she does not have heart valve issues, and it not recommended after jt replacement surgeries.  Thanks,  Lev Tristan MD          No role for antibiotic prophylaxis prior to dental procedures - Based on available data and consistent with expert guidelines from both the American Dental Association (ADA) and the American Academy of Orthopedic Surgeons (AAOS), we advise against the routine use of antibiotic prophylaxis in patients with prosthetic joints undergoing dental procedures (Grade 1B). Patients who have another indication for pre-dental-procedure prophylaxis (such as heart valve disease requiring endocarditis prophylaxis or a surgical procedure for which antibiotics are given to prevent a surgical site infection) should receive antibiotics. (See 'Dental procedures' above.)

## 2023-10-17 NOTE — TELEPHONE ENCOUNTER
Patient calling clinic to ask about antibiotics before dental cleanings.  Patient is currently at the dentist's office and they are asking her about this.  Huddled with CW.  Provider completing chart review.  Sandra ANTUNEZ RN

## 2023-10-17 NOTE — TELEPHONE ENCOUNTER
allopurinol (ZYLOPRIM) 100 MG tablet     Gout Agents Protocol Passed     7/21/2023  [facility] Zulma Jaramillo MD  Rheumatology

## 2023-10-19 NOTE — PLAN OF CARE
Problem: Goal Outcome Summary  Goal: Goal Outcome Summary  OT: 4AB: OT orders received, reviewed and completed. 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 OT rasheed armires'd       Statement Selected

## 2023-11-01 ENCOUNTER — TELEPHONE (OUTPATIENT)
Dept: RHEUMATOLOGY | Facility: CLINIC | Age: 83
End: 2023-11-01
Payer: MEDICARE

## 2023-11-01 NOTE — TELEPHONE ENCOUNTER
Message left for caregiver Nghia to call back to discuss concerns related to Betty.    Stefania Graves, BSN, RN  RN Care Coordinator Rheumatology

## 2023-11-01 NOTE — TELEPHONE ENCOUNTER
[facility] Call Center    Phone Message    May a detailed message be left on voicemail: yes     Reason for Call: Symptoms or Concerns     If patient has red-flag symptoms, warm transfer to triage line    Current symptom or concern: Caller states that patient has been dealing with Gout like symptoms is in pain and  needs to be seen  asap. She is regularly taking the anakinra (KINERET) 100 MG/0.67ML SOSY injection  but is having symptoms more frequently     Symptoms have been present for:  1 month(s)    Has patient previously been seen for this? Yes    Are there any new or worsening symptoms? Yes: Pain     Action Taken: Other: RHEUM     Travel Screening: Not Applicable

## 2023-11-02 DIAGNOSIS — E55.9 VITAMIN D DEFICIENCY: ICD-10-CM

## 2023-11-02 RX ORDER — ACETAMINOPHEN 160 MG
100 TABLET,DISINTEGRATING ORAL DAILY
Qty: 180 CAPSULE | Refills: 0 | Status: SHIPPED | OUTPATIENT
Start: 2023-11-02 | End: 2024-01-29

## 2023-11-02 NOTE — TELEPHONE ENCOUNTER
Call returned to this RN from caregiver Carolyn Hernández is having increased flare up, more joints involved, increased frequency.  Patient does use the anakinra as prescribed, however it is not providing the same effect as previously.  Caregiver is concerned that patient may need new treatment options.    Dr. Lopez had a cancellation- offered and scheduled appt for tomorrow at 1330.    All questions answered.    BARBI Conley, RN  RN Care Coordinator Rheumatology

## 2023-11-03 ENCOUNTER — OFFICE VISIT (OUTPATIENT)
Dept: RHEUMATOLOGY | Facility: CLINIC | Age: 83
End: 2023-11-03
Attending: INTERNAL MEDICINE
Payer: MEDICARE

## 2023-11-03 VITALS
HEART RATE: 57 BPM | DIASTOLIC BLOOD PRESSURE: 49 MMHG | OXYGEN SATURATION: 96 % | SYSTOLIC BLOOD PRESSURE: 125 MMHG | WEIGHT: 176.8 LBS | BODY MASS INDEX: 28.54 KG/M2

## 2023-11-03 DIAGNOSIS — M06.00 SERONEGATIVE RHEUMATOID ARTHRITIS (H): ICD-10-CM

## 2023-11-03 DIAGNOSIS — M19.90 INFLAMMATORY ARTHRITIS: ICD-10-CM

## 2023-11-03 DIAGNOSIS — M35.02 SJOGREN'S SYNDROME WITH LUNG INVOLVEMENT (H): ICD-10-CM

## 2023-11-03 DIAGNOSIS — M10.9 GOUT, UNSPECIFIED CAUSE, UNSPECIFIED CHRONICITY, UNSPECIFIED SITE: Primary | ICD-10-CM

## 2023-11-03 PROCEDURE — G0463 HOSPITAL OUTPT CLINIC VISIT: HCPCS | Performed by: INTERNAL MEDICINE

## 2023-11-03 PROCEDURE — 99214 OFFICE O/P EST MOD 30 MIN: CPT | Performed by: INTERNAL MEDICINE

## 2023-11-03 ASSESSMENT — PAIN SCALES - GENERAL: PAINLEVEL: NO PAIN (0)

## 2023-11-03 NOTE — PROGRESS NOTES
Rheumatology Clinic In Person Follow up Visit Note  Zulma Lopez MD  DOS:11/3/2023  Date of last visit: 2023    Name: Betty Tee  MRN: [id number]  Age: 83 year old  : 1940  Reason for visit: Follow up visit for PMR, presumed gout, primary Sjogren's syndrome    # Sjogren's syndrome since  with borderline +RG, +SSA, and lip biopsy focus score of 1. Ongoing dry mouth on evoxac qod  # Follicular bronchiolitis, prior mild ILD   # Osteopenia on DEXA 2019 with T score=-2.1 with decline in bone density on fosamax  # Chronic prednisone use  # DM  # A fib  # Severe R hip OA  # PMR stable on prednisone 5 mg every day  #Presumed gout flare, recovered  #ESOB      Assessment and Plan:    New Dx of PMR. Severe R hip pain is due to severe OA based on 2020 hip MRI. She prefered to avoid hip arthroplasty in the past but it is quite bothersome and would like to see ortho. Her inflammation markers improved on prednisone, PMR responded to prednisone, now is 5 mg qd.     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 Completed pulmonary rehab in 2019 where she was able to exercise without oxygen. GFR stable.    On HCQ since 2019 with significant improvement of joint pain. No retinal toxicity on eye exam done 2021. Tolerates HCQ well.     Sister Betty recovered from flare of presumed gout (or pseudogout given previous nl SUA) over L foot. She responded to high dose prednisone (40-30-20-10 mg every day each for 3 days) in the past, now is on 5 mg every day.  Given her DM, osteopenia, highly recommend to start using anakinra prn for gout flares, no flares and has not required it yet.     She preferred in the past not to start daily urate-lowering therapy and her uric acid has been ~6 so allopurinol deferred.     2022: Sister Betty's major complaint at last visit was ESOB which is  improved after Tx of B12 deficiency, iron deficiency due to GIB. 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 Her major complaint today is severe R hip pain due to OA which eventually requires R hip replacement, but she needs to be medically clear for surgery. Discussed pain mx and advised follow up with ortho.      1/13/2023: Stable, no active gout today. B12/iron deficiency anemia has improved. On allopurinol 50 mg po every day.    7/21/2023: On increased allopurinol 100 mg every day since 1/2023, severe polyarticular gout flare last week requiring ER visit and prednisone 40-30-20-10 mg every day, each x 3 days then 5 mg a day, as anakinra daily inj was not enough to control it.    Recovered from gout flare, discussed ongoing m/o gout.    Plan:    Prednisone 5 mg a day    Norco as needed for pain      Cevimeline for dry mouth could be taken 3 times a day (she takes it every other day as does not like to take so many pills)    Stay on fosamax weekly    Yearly eye exam on HCQ    Cut back on plaquenil to 1 tab a day    For gout flares: take anakinra daily shots x 5-7 days, if not enough, take the prednisone as 40-30-20-10 mg a day, each course x 3 days then go back to 5 mg a day    Return in 6 months (in person)    Labs today    Allopurinol 100 mg a day    Return in person in 6 months    Today 11/3/2023:    Recent gout flare, affecting multiple joints, responded to anakinra well. Will continue anakinra prn. Will re-check SUA with next blood draw and adjust allopurinol as needed. Will refer to endo to address bone health, no improvement of fosamax.    Plan:    Refer to Yvonne Hunt endocrinologist for osteoporosis management    Labs in 1/2024    Keep 1/2024 appointment with denia Lopez MD         HPI:   Betty Tee is a 81 year old WF with a history of primary Sjogren's syndrome, PMR, OA who presents for follow-up. She was diagnosed with Sjogren's syndrome in 2015 with borderline  +RG, +SSA, and lip biopsy focus score of 1. Associated ILD and joint pain are prednisone-responsive which support the diagnosis.     10/1/2021: No gout flares since last visit so has not needed anakinra. She continues to have R hip pain related to severe OA but does not want to pursue surgery. She recently started pool therapy and began taking CBD oil yesterday. Is hoping to pursue medical marijuana in the future as she does not want to be dependent on norco. Aside from hip pain has otherwise been doing well.        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 She is on prednisone 5 mg a day, she was given prednisone taper recently for possible L foot gout flare which helped. Did not flare again to need using anakinra shots. She has severe R hip pain. Saw ortho, had R hip MRI on 9/5/2020 which showed severe OA, R hip arthroplasty was recommended. She would prefer to delay R hip arthroplasty, had R hip inj on 2/24, NSAIDs are not allowed with CKD. Norco helps but she does not like to be dependent on it, takes it rarely. No L foot pain today. Her hip/leg pain/back pain is getting better, going to PT, doing exercises at home, last time elbow massage caused pain. Epidural shot helped. Has dry mouth. SOB is stable at baseline.  Last 3 wk has been hard, her doctor took her off gabapentin, sweat/chills and loss of appetite. Reason was being on other meds. Now off gabapentin. Had several gushing bowel explosion, could not make it to the bathroom. Random, unpredictable. No triggers. Has had this problem for years.     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    No gout flares, has not required anakinra, it is in the fridge.    Has breathing issue, ESOB is worse. O2 level is good. Saw PCP, was recomemnded to do follow up with cardiology.    Getting R hip steroid inj, last one was for bursitis. Still hurts crissy today. Saw pain mx yesterday. Got minimal exercises to help  moving.    7/8/2022:    Sister Betty presents for follow up. At last visit,w as found to be anemic which explained her SOB. She had iron deficiency and B12 deficiency. Was found to have GIB. Had multiple GI visits, EGD, was treated with cauterization x 3  for GAVE. This AM, had brown stool not black.    SOB is improved.    No gout flares since last visit.    Her major complaint is severe R hip pain, not responding to current pain mx, considers hip arthroplasty, but was told to wait till anemia gets fixed, also has to figure out her heart condition.      1/13/2023:     No gout flare today  Anakinra prn helps with flares  SOB is better  Hip OA pain is the same, considers arthroplasty.      7/21/2023:    Reviewed chart, labs, recent events.    -s/p R hip arthroplasty, recovered well  -Severe polyarticular gout flare last week requiring ER visit and prednisone 40-30-20-10 mg every day, each x 3 days then 5 mg a day, as anakinra qd inj was not enough to control it.  -better now    Today 11/3/2023: Doing better than last visit, still flares with gout but not as bad, anakinra helps.    Review of Systems:   A comprehensive ROS was done. Positives are per HPI.      Active Medications:     Outpatient Medications Prior to Visit   Medication Sig Dispense Refill     ACCU-CHEK SHANNON PLUS test strip USE TO TEST BLOOD SUGARS 3 TIMES DAILY 300 strip 5     acetaminophen (TYLENOL) 500 MG tablet Take 2 tablets (1,000 mg) by mouth every 8 hours as needed (max 6 tablets/24 hours, 2 tablets/dose) 60 tablet 0     acyclovir (ZOVIRAX) 400 MG tablet Take 1 tablet (400 mg) by mouth every 8 hours For a couple days 15 tablet 2     acyclovir (ZOVIRAX) 5 % external ointment Apply topically as needed (6 times day PRN for outbreaks) As needed for outbreaks 15 g 3     albuterol (PROAIR HFA/PROVENTIL HFA/VENTOLIN HFA) 108 (90 Base) MCG/ACT inhaler Inhale 2 puffs into the lungs every 6 hours as needed for wheezing or shortness of breath        alendronate (FOSAMAX) 70 MG tablet Take 1 tablet (70 mg) by mouth every 7 days 12 tablet 1     allopurinol (ZYLOPRIM) 100 MG tablet Take 1 tablet (100 mg) by mouth daily 90 tablet 1     anakinra (KINERET) 100 MG/0.67ML SOSY injection Inject 0.67 mLs (100 mg) Subcutaneous daily 20.1 mL 3     aspirin (ASA) 81 MG EC tablet Take 1 tablet (81 mg) by mouth 2 times daily 60 tablet 0     atorvastatin (LIPITOR) 10 MG tablet TAKE 1/2 TABLET BY MOUTH EVERY DAY 45 tablet 3     azithromycin (ZITHROMAX) 250 MG tablet TAKE 1 TABLET BY MOUTH EVERY DAY 30 tablet 5     BD PEN NEEDLE JANE 2ND GEN 32G X 4 MM miscellaneous USE TO TEST 4 TIMES A  each 1     benzonatate (TESSALON) 100 MG capsule Take 1 capsule (100 mg) by mouth 3 times daily as needed for cough 25 capsule 0     blood glucose (NO BRAND SPECIFIED) lancets standard Use to test blood sugar 3 times daily or as directed 100 lancet 3     BREO ELLIPTA 100-25 MCG/ACT inhaler TAKE 1 PUFF BY MOUTH EVERY DAY 1 each 11     cevimeline (EVOXAC) 30 MG capsule Take 1 capsule (30 mg) by mouth 3 times daily as needed       Cholecalciferol (VITAMIN D3) 50 MCG (2000 UT) CAPS TAKE 100 MCG BY MOUTH DAILY (TAKE 2 TABLET (50 MCG) BY MOUTH DAILY - ORAL) 180 capsule 0     COMPOUNDED NON-CONTROLLED SUBSTANCE (CMPD RX) - PHARMACY TO MIX COMPOUNDED MEDICATION Estriol 1 mg/g Apply small amount to finger and apply to inside vagina daily for 2 weeks then twice weekly Route: vaginally Dispense 30 grams 11 refills 30 g 11     cyanocobalamin (VITAMIN B-12) 1000 MCG tablet Take 1 tablet (1,000 mcg) by mouth three times a week       escitalopram (LEXAPRO) 20 MG tablet TAKE 1 TABLET BY MOUTH EVERY DAY 90 tablet 1     ferrous gluconate (FERGON) 324 (38 Fe) MG tablet Take 1 tablet (324 mg) by mouth three times a week       fluticasone (FLONASE) 50 MCG/ACT nasal spray SPRAY 1-2 SPRAYS INTO BOTH NOSTRILS DAILY AS NEEDED FOR ALLERGIES 16 mL 11     hydroxychloroquine (PLAQUENIL) 200 MG tablet Take 1 tablet  (200 mg) by mouth daily Get annual eye exams for hydroxychloroquine (Plaquenil) monitoring and fax to [phone] 90 tablet 1     insulin glargine (LANTUS PEN) 100 UNIT/ML pen Inject 10 Units Subcutaneous At Bedtime 15 mL 1     insulin lispro (HUMALOG KWIKPEN) 100 UNIT/ML (1 unit dial) KWIKPEN Inject Subcutaneous 3 times daily (before meals) Sliding scale insulin; tests BG three times day  If BG <150, no insulin given   If -200 take 2 units  If -250 take 4 units  If -300 take 6 units  If -350 take 10 units  If BG >350 take 14 units       ketoconazole (NIZORAL) 2 % external cream Apply topically 2 times daily as needed for itching 60 g 1     levocetirizine (XYZAL) 5 MG tablet Take 1 tablet (5 mg) by mouth every evening 90 tablet 1     lidocaine (LIDODERM) 5 % patch APPLY PATCH TO PAINFUL AREA FOR UP TO 12 H WITHIN A 24 H PERIOD. REMOVE AFTER 12 HOURS. (Patient taking differently: Apply patch to painful area for up to 12 h within a 24 h period.  Remove after 12 hours.) 30 patch 2     loratadine (CLARITIN) 10 MG tablet TAKE 1 TABLET BY MOUTH EVERY DAY 90 tablet 3     methocarbamol (ROBAXIN) 750 MG tablet Take 1 tablet (750 mg) by mouth 3 times daily as needed for muscle spasms 90 tablet 3     metoprolol succinate ER (TOPROL XL) 50 MG 24 hr tablet Take 1 tablet (50 mg) by mouth 2 times daily 180 tablet 3     montelukast (SINGULAIR) 10 MG tablet TAKE 1 TABLET BY MOUTH EVERYDAY AT BEDTIME 90 tablet 1     OZEMPIC, 1 MG/DOSE, 4 MG/3ML pen INJECT 1 MG SUBCUTANEOUS ONCE A WEEK 9 mL 1     pantoprazole (PROTONIX) 40 MG EC tablet Take 1 tablet (40 mg) by mouth daily (replaces famotidine- should stop taking famotidine) 90 tablet 3     polyethylene glycol (MIRALAX) 17 g packet Take 17 g by mouth daily 10 packet 0     potassium chloride ER (KLOR-CON) 20 MEQ CR tablet Take 2 tablets (40 mEq) by mouth every morning AND 1 tablet (20 mEq) every evening. 270 tablet 3     predniSONE (DELTASONE) 10 MG tablet For  gout flares, take the prednisone as 40-30-20-10 mg a day, each course x 3 days then go back to 5 mg a day 30 tablet 3     predniSONE (DELTASONE) 5 MG tablet Take 1 tablet (5 mg) by mouth daily 90 tablet 1     torsemide (DEMADEX) 20 MG tablet Take 2 tablets (40 mg) by mouth every morning AND 1 tablet (20 mg) every evening. 270 tablet 3     traZODone (DESYREL) 50 MG tablet Take 1-2 tablets ( mg) by mouth At Bedtime       No facility-administered medications prior to visit.     Allergies:   Allergies   Allergen Reactions     Amoxicillin-Pot Clavulanate Nausea and Vomiting     Amoxicillin-Pot Clavulanate      Codeine Nausea and Vomiting     PN: LW Reaction: HIVES     Penicillins Nausea and Vomiting     PN: LW Reaction: GI Upset     Phenobarbital Itching     Seasonal Allergies         Past Medical History:  Alcohol abuse, in remission.   Allergic rhinitis.   Antiplatelet long-term use.   Atrial fibrillation.   Cardiomegaly.   Osteopenia.   Diverticulosis.   Benign essential hypertension.   GERD.   Insomnia.   Irregular heart beat.   Lumbago.   Major depressive disorder, recurrent episode, moderate.   ANGELICA.  Osteoarthrosis.   Sjogren's syndrome.   Sleep apnea.   Tobacco use disorder.   TMJ disorder.   RLS.  Major depressive disorder.   Hypertension.   Sciatica.   Colouterine fistula.   Left ventricular hypertrophy.   Breat fibroadenoma.   DVT.   Fatty liver disease, nonalcoholic.   Rib pain.   Neck mass.   Interstitial lung disease.   Acute and chronic respiratory failure with hypoxia.   Type II diabetes mellitus.   Hyperlipidemia.   Inflammatory arthritis.      Past Surgical History:  Back surgery.   Left breast biopsy.   Appendectomy.   Exploratory laparotomy.   Cardiac surgery.   Cholecystectomy.   Left colectomy.   Total abdominal hysterectomy with bilateral salpingo-oophorectomy.   Insert ureter stent.   Flexible sigmoidoscopy.   Ileostomy takedown.     Family History:   Mother: Positive for CAD, heart disease,  hypertension, cerebrovascular disease, hyperlipidemia.   Father: Positive for alcohol/drug abuse, Alzheimer disease, dementia, hypertension, hyperlipidemia.   Sister: Positive for CAD, hypertension, and colorectal cancer.   Sister: Positive for hypertension and hyperlipidemia.   Sister: Positive for diabetes, lung cancer, asthma, and heart disease.   Brother: Positive for Parkinsonism and substance abuse.   Brother: Positive for hypertension, diverticulitis, and prostate cancer.   Daughter: Positive for breast cancer.        Social History:   She is a former smoker; quit in 2011. She has a history of alcohol use but stopped drinking in 1986.      Physical Exam:     /49 (BP Location: Right arm, Patient Position: Sitting, Cuff Size: Adult Large)   Pulse 57   Wt 80.2 kg (176 lb 12.8 oz)   LMP  (LMP Unknown)   SpO2 96%   BMI 28.54 kg/m      Constitutional: NAD, very pleasant, sister Nghia present   Eyes: Normal EOM, conjunctiva, sclera  Chest: CTAB  CV: no M/R/G, RRR  Abdomen: soft, NT  MSK: no active synovitis or joint tenderness. Able to get out of chair and ambulate with minimal help from the cane  Skin: No rash  Neuro: grossly non-focal  Psych: Normal affect.    Images:  CT Chest w/o contrast (7/25/18):  Findings remain most consistent with small airway disease  and/or nonclassifiable interstitial lung disease and are not  significantly changed from the March 2017 comparison.    Per radiology.    Laboratory:       Latest Ref Rng & Units 8/1/2023     3:08 PM 9/7/2023    12:39 PM 10/11/2023     2:25 PM   RHEUM RESULTS   Albumin 3.5 - 5.2 g/dL 4.0      ALT 0 - 50 U/L 12      AST 0 - 45 U/L 18      Creatinine 0.51 - 0.95 mg/dL 0.99  0.91  0.86    GFR Estimate >60 mL/min/1.73m2 56  62  67    Hematocrit 35.0 - 47.0 % 39.2  39.6  38.9    Hemoglobin 11.7 - 15.7 g/dL 12.8  13.0  12.7    WBC 4.0 - 11.0 10e3/uL 9.0  11.7  6.9    RBC Count 3.80 - 5.20 10e6/uL 4.36  4.40  4.29    RDW 10.0 - 15.0 % 13.3  14.1  14.5     MCHC 31.5 - 36.5 g/dL 32.7  32.8  32.6    MCV 78 - 100 fL 90  90  91    Platelet Count 150 - 450 10e3/uL 200  227  177        Rheumatoid Factor   Date Value Ref Range Status   07/24/2015 <20 <20 IU/mL Final   ,  ,   Cyclic Cit Pept IgG/IgA   Date Value Ref Range Status   07/24/2015 <20  Interpretation:  Negative   <20 UNITS Final   ,  ,   Scleroderma Antibody Scl-70 CECILIA IgG   Date Value Ref Range Status   07/24/2015  0.0 - 0.9 AI Final    <0.2  Negative   Antibody index (AI) values reflect qualitative changes in antibody   concentration that cannot be directly associated with clinical condition or   disease state.       SSA (Ro) (CECILIA) Antibody, IgG   Date Value Ref Range Status   07/24/2015 1.6 (H) 0.0 - 0.9 AI Final     Comment:     Positive   Antibody index (AI) values reflect qualitative changes in antibody   concentration that cannot be directly associated with clinical condition or   disease state.       SSB (La) (CECILIA) Antibody, IgG   Date Value Ref Range Status   07/24/2015  0.0 - 0.9 AI Final    <0.2  Negative   Antibody index (AI) values reflect qualitative changes in antibody   concentration that cannot be directly associated with clinical condition or   disease state.       ,  ,   RG Screen by EIA   Date Value Ref Range Status   07/24/2015 <1.0  Interpretation:  Negative   <1.0 Final   ,  ,  ,  ,  ,  ,  ,  ,  ,  ,  ,  ,  ,  ,  ,  ,  ,  ,   Neutrophil Cytoplasmic IgG Antibody   Date Value Ref Range Status   07/24/2015   Final    <1:20  Reference range: <1:20  (Note)  The ANCA IFA is <1:20; therefore, no further testing will  be performed.  INTERPRETIVE INFORMATION: Anti-Neutrophil Cyto Ab, IgG  Neutrophil Cytoplasmic Antibodies (C-ANCA = granular  cytoplasmic staining, P-ANCA = perinuclear staining) are  found in the serum of over 90 percent of patients with  certain necrotizing systemic vasculitides, and usually in  less than 5 percent of patients with collagen vascular  disease or arthritis.  Performed  by [email or web address],  [address] [place] [address] [phone]  [email or web address] [email or web address] Mckeon MD, Lab. Director       ,  ,  ,  ,  ,  ,  ,   IGG   Date Value Ref Range Status   07/24/2015 1320 695 - 1620 mg/dL Final   ,  ,  ,  ,  ,   Scleroderma Antibody Scl-70 CECILIA IgG   Date Value Ref Range Status   07/24/2015  0.0 - 0.9 AI Final    <0.2  Negative   Antibody index (AI) values reflect qualitative changes in antibody   concentration that cannot be directly associated with clinical condition or   disease state.          CBC RESULTS: 6/4/2021   Lab Test 06/04/21  1422   WBC 8.6   RBC 4.46   HGB 13.4   HCT 42.1   MCV 94   MCH 30.0   MCHC 31.8   RDW 15.5*        Last Comprehensive Metabolic Panel:  Sodium   Date Value Ref Range Status   10/11/2023 135 135 - 145 mmol/L Final     Comment:     Reference intervals for this test were updated on 09/26/2023 to more accurately reflect our healthy population. There may be differences in the flagging of prior results with similar values performed with this method. Interpretation of those prior results can be made in the context of the updated reference intervals.    06/04/2021 137 133 - 144 mmol/L Final     Potassium   Date Value Ref Range Status   10/11/2023 3.8 3.4 - 5.3 mmol/L Final   08/23/2022 4.1 3.4 - 5.3 mmol/L Final   06/04/2021 4.0 3.4 - 5.3 mmol/L Final     Chloride   Date Value Ref Range Status   10/11/2023 98 98 - 107 mmol/L Final   08/23/2022 107 94 - 109 mmol/L Final   06/04/2021 106 94 - 109 mmol/L Final     Carbon Dioxide   Date Value Ref Range Status   06/04/2021 25 20 - 32 mmol/L Final     Carbon Dioxide (CO2)   Date Value Ref Range Status   10/11/2023 28 22 - 29 mmol/L Final   08/23/2022 22 20 - 32 mmol/L Final     Anion Gap   Date Value Ref Range Status   10/11/2023 9 7 - 15 mmol/L Final   08/23/2022 8 3 - 14 mmol/L Final   06/04/2021 6 3 - 14 mmol/L Final     Glucose   Date Value Ref Range Status   10/11/2023 208 (H) 70 - 99 mg/dL Final    08/23/2022 198 (H) 70 - 99 mg/dL Final   06/04/2021 142 (H) 70 - 99 mg/dL Final     GLUCOSE BY METER POCT   Date Value Ref Range Status   06/05/2023 259 (H) 70 - 99 mg/dL Final     Urea Nitrogen   Date Value Ref Range Status   10/11/2023 9.9 8.0 - 23.0 mg/dL Final   08/23/2022 17 7 - 30 mg/dL Final   06/04/2021 14 7 - 30 mg/dL Final     Creatinine   Date Value Ref Range Status   10/11/2023 0.86 0.51 - 0.95 mg/dL Final   06/04/2021 1.11 (H) 0.52 - 1.04 mg/dL Final     GFR Estimate   Date Value Ref Range Status   10/11/2023 67 >60 mL/min/1.73m2 Final   06/04/2021 47 (L) >60 mL/min/[1.73_m2] Final     Comment:     Non  GFR Calc  Starting 12/18/2018, serum creatinine based estimated GFR (eGFR) will be   calculated using the Chronic Kidney Disease Epidemiology Collaboration   (CKD-EPI) equation.       Calcium   Date Value Ref Range Status   10/11/2023 8.8 8.8 - 10.2 mg/dL Final   06/04/2021 8.8 8.5 - 10.1 mg/dL Final       ESR 6/4/2021: 10.0    CRP 6/4/2021: 3.0    Uric acid 6/4/2021: 6.2    Component      Latest Ref Rng & Units 2/11/2022   WBC      4.0 - 11.0 10e3/uL 11.5 (H)   RBC Count      3.80 - 5.20 10e6/uL 3.47 (L)   Hemoglobin      11.7 - 15.7 g/dL 9.1 (L)   Hematocrit      35.0 - 47.0 % 30.2 (L)   MCV      78 - 100 fL 87   MCH      26.5 - 33.0 pg 26.2 (L)   MCHC      31.5 - 36.5 g/dL 30.1 (L)   RDW      10.0 - 15.0 % 15.1 (H)   Platelet Count      150 - 450 10e3/uL 283   % Neutrophils      % 76   % Lymphocytes      % 9   % Monocytes      % 11   % Eosinophils      % 2   % Basophils      % 1   % Immature Granulocytes      % 1   NRBCs per 100 WBC      <1 /100 0   Absolute Neutrophils      1.6 - 8.3 10e3/uL 8.9 (H)   Absolute Lymphocytes      0.8 - 5.3 10e3/uL 1.0   Absolute Monocytes      0.0 - 1.3 10e3/uL 1.2   Absolute Eosinophils      0.0 - 0.7 10e3/uL 0.2   Absolute Basophils      0.0 - 0.2 10e3/uL 0.1   Absolute Immature Granulocytes      <=0.4 10e3/uL 0.1   Absolute NRBCs      10e3/uL 0.0    Albumin Fraction      3.7 - 5.1 g/dL 3.9   Alpha 1 Fraction      0.2 - 0.4 g/dL 0.4   Alpha 2 Fraction      0.5 - 0.9 g/dL 0.8   Beta Fraction      0.6 - 1.0 g/dL 1.0   Gamma Fraction      0.7 - 1.6 g/dL 0.8   Monoclonal Peak      <=0.0 g/dL 0.0   ELP Interpretation:       Essentially normal electrophoretic pattern. No obvious monoclonal proteins seen. Pathologic significance requires clinical correlation. Meghan Brothers M.D., Ph.D.   Iron      35 - 180 ug/dL 17 (L)   Iron Binding Cap      240 - 430 ug/dL 354   Iron Saturation Index      15 - 46 % 5 (L)   Creatinine      0.52 - 1.04 mg/dL 1.00   GFR Estimate      >60 mL/min/1.73m2 56 (L)   % Retic      0.5 - 2.0 % 2.7 (H)   Absolute Retic      0.025 - 0.095 10e6/uL 0.090   ALT      0 - 50 U/L 17   Albumin      3.4 - 5.0 g/dL 3.3 (L)   AST      0 - 45 U/L 15   Sed Rate      0 - 30 mm/hr 36 (H)   CRP Inflammation      0.0 - 8.0 mg/L 12.5 (H)   Uric Acid      2.6 - 6.0 mg/dL 6.1 (H)   Immunofixation ELP       No monoclonal protein seen on immunofixation. Pathologic significance requires clinical correlation.  Meghan Brothers M.D., Ph.D.   Immunofix ELP Urine       No monoclonal protein seen on immunofixation. Pathologic significance requires clinical correlation.  Meghan Brothers M.D., Ph.D.   Total Protein Serum for ELP      6.8 - 8.8 g/dL 6.9   N-Terminal Pro Bnp      0 - 450 pg/mL 239   Haptoglobin      32 - 197 mg/dL 149   Vitamin B12      193 - 986 pg/mL 162 (L)   Ferritin      8 - 252 ng/mL 14

## 2023-11-03 NOTE — NURSING NOTE
Chief Complaint   Patient presents with    RECHECK     /49 (BP Location: Right arm, Patient Position: Sitting, Cuff Size: Adult Large)   Pulse 57   Wt 80.2 kg (176 lb 12.8 oz)   LMP  (LMP Unknown)   SpO2 96%   BMI 28.54 kg/m    Juana CAMPBELL

## 2023-11-03 NOTE — PATIENT INSTRUCTIONS
Refer to Yvonne Hunt endocrinologist for osteoporosis management    Labs in 1/2024    Keep 1/2024 appointment with me

## 2023-11-03 NOTE — LETTER
11/3/2023       RE: Betty Tee  [address]  [facility] [address]     Dear Colleague,    Thank you for referring your patient, Betty Tee, to the [facility] RHEUMATOLOGY [facility] [place] at [facility]. Please see a copy of my visit note below.    Rheumatology Clinic In Person Follow up Visit Note  Zulma Lopez MD  DOS:11/3/2023  Date of last visit: 2023    Name: Betty Tee  MRN: [id number]  Age: 83 year old  : 1940  Reason for visit: Follow up visit for PMR, presumed gout, primary Sjogren's syndrome    # Sjogren's syndrome since  with borderline +RG, +SSA, and lip biopsy focus score of 1. Ongoing dry mouth on evoxac qod  # Follicular bronchiolitis, prior mild ILD   # Osteopenia on DEXA 2019 with T score=-2.1 with decline in bone density on fosamax  # Chronic prednisone use  # DM  # A fib  # Severe R hip OA  # PMR stable on prednisone 5 mg every day  #Presumed gout flare, recovered  #ESOB      Assessment and Plan:    New Dx of PMR. Severe R hip pain is due to severe OA based on 2020 hip MRI. She prefered to avoid hip arthroplasty in the past but it is quite bothersome and would like to see ortho. Her inflammation markers improved on prednisone, PMR responded to prednisone, now is 5 mg qd.     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 Completed pulmonary rehab in 2019 where she was able to exercise without oxygen. GFR stable.    On HCQ since 2019 with significant improvement of joint pain. No retinal toxicity on eye exam done 2021. Tolerates HCQ well.     Sister Betty recovered from flare of presumed gout (or pseudogout given previous nl SUA) over L foot. She responded to high dose prednisone (40-30-20-10 mg every day each for 3 days) in the past, now is on 5 mg every day.  Given her DM,  osteopenia, highly recommend to start using anakinra prn for gout flares, no flares and has not required it yet.     She preferred in the past not to start daily urate-lowering therapy and her uric acid has been ~6 so allopurinol deferred.     7/8/2022: Sister Betty's major complaint at last visit was ESOB which is improved after Tx of B12 deficiency, iron deficiency due to GIB. 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 Her major complaint today is severe R hip pain due to OA which eventually requires R hip replacement, but she needs to be medically clear for surgery. Discussed pain mx and advised follow up with ortho.      1/13/2023: Stable, no active gout today. B12/iron deficiency anemia has improved. On allopurinol 50 mg po every day.    7/21/2023: On increased allopurinol 100 mg every day since 1/2023, severe polyarticular gout flare last week requiring ER visit and prednisone 40-30-20-10 mg every day, each x 3 days then 5 mg a day, as anakinra daily inj was not enough to control it.    Recovered from gout flare, discussed ongoing m/o gout.    Plan:    Prednisone 5 mg a day    Norco as needed for pain      Cevimeline for dry mouth could be taken 3 times a day (she takes it every other day as does not like to take so many pills)    Stay on fosamax weekly    Yearly eye exam on HCQ    Cut back on plaquenil to 1 tab a day    For gout flares: take anakinra daily shots x 5-7 days, if not enough, take the prednisone as 40-30-20-10 mg a day, each course x 3 days then go back to 5 mg a day    Return in 6 months (in person)    Labs today    Allopurinol 100 mg a day    Return in person in 6 months    Today 11/3/2023:    Recent gout flare, affecting multiple joints, responded to anakinra well. Will continue anakinra prn. Will re-check SUA with next blood draw and adjust allopurinol as needed. Will refer to endo to address bone health, no improvement of fosamax.    Plan:    Refer to Yvonne Hunt  endocrinologist for osteoporosis management    Labs in 1/2024    Keep 1/2024 appointment with denia Lopez MD         HPI:   Betty Tee is a 81 year old WF with a history of primary Sjogren's syndrome, PMR, OA who presents for follow-up. She was diagnosed with Sjogren's syndrome in 2015 with borderline +RG, +SSA, and lip biopsy focus score of 1. Associated ILD and joint pain are prednisone-responsive which support the diagnosis.     10/1/2021: No gout flares since last visit so has not needed anakinra. She continues to have R hip pain related to severe OA but does not want to pursue surgery. She recently started pool therapy and began taking CBD oil yesterday. Is hoping to pursue medical marijuana in the future as she does not want to be dependent on norco. Aside from hip pain has otherwise been doing well.        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 She is on prednisone 5 mg a day, she was given prednisone taper recently for possible L foot gout flare which helped. Did not flare again to need using anakinra shots. She has severe R hip pain. Saw ortho, had R hip MRI on 9/5/2020 which showed severe OA, R hip arthroplasty was recommended. She would prefer to delay R hip arthroplasty, had R hip inj on 2/24, NSAIDs are not allowed with CKD. Norco helps but she does not like to be dependent on it, takes it rarely. No L foot pain today. Her hip/leg pain/back pain is getting better, going to PT, doing exercises at home, last time elbow massage caused pain. Epidural shot helped. Has dry mouth. SOB is stable at baseline.  Last 3 wk has been hard, her doctor took her off gabapentin, sweat/chills and loss of appetite. Reason was being on other meds. Now off gabapentin. Had several gushing bowel explosion, could not make it to the bathroom. Random, unpredictable. No triggers. Has had this problem for years.     2/11/2022: Sister betty presents for follow  up.    No gout flares, has not required anakinra, it is in the fridge.    Has breathing issue, ESOB is worse. O2 level is good. Saw PCP, was recomemnded to do follow up with cardiology.    Getting R hip steroid inj, last one was for bursitis. Still hurts crissy today. Saw pain mx yesterday. Got minimal exercises to help moving.    7/8/2022:    Sister Betty presents for follow up. At last visit,w as found to be anemic which explained her SOB. She had iron deficiency and B12 deficiency. Was found to have GIB. Had multiple GI visits, EGD, was treated with cauterization x 3  for GAVE. This AM, had brown stool not black.    SOB is improved.    No gout flares since last visit.    Her major complaint is severe R hip pain, not responding to current pain mx, considers hip arthroplasty, but was told to wait till anemia gets fixed, also has to figure out her heart condition.      1/13/2023:     No gout flare today  Anakinra prn helps with flares  SOB is better  Hip OA pain is the same, considers arthroplasty.      7/21/2023:    Reviewed chart, labs, recent events.    -s/p R hip arthroplasty, recovered well  -Severe polyarticular gout flare last week requiring ER visit and prednisone 40-30-20-10 mg every day, each x 3 days then 5 mg a day, as anakinra qd inj was not enough to control it.  -better now    Today 11/3/2023: Doing better than last visit, still flares with gout but not as bad, anakinra helps.    Review of Systems:   A comprehensive ROS was done. Positives are per HPI.      Active Medications:     Outpatient Medications Prior to Visit   Medication Sig Dispense Refill    ACCU-CHEK SHANNON PLUS test strip USE TO TEST BLOOD SUGARS 3 TIMES DAILY 300 strip 5    acetaminophen (TYLENOL) 500 MG tablet Take 2 tablets (1,000 mg) by mouth every 8 hours as needed (max 6 tablets/24 hours, 2 tablets/dose) 60 tablet 0    acyclovir (ZOVIRAX) 400 MG tablet Take 1 tablet (400 mg) by mouth every 8 hours For a couple days 15 tablet 2     acyclovir (ZOVIRAX) 5 % external ointment Apply topically as needed (6 times day PRN for outbreaks) As needed for outbreaks 15 g 3    albuterol (PROAIR HFA/PROVENTIL HFA/VENTOLIN HFA) 108 (90 Base) MCG/ACT inhaler Inhale 2 puffs into the lungs every 6 hours as needed for wheezing or shortness of breath      alendronate (FOSAMAX) 70 MG tablet Take 1 tablet (70 mg) by mouth every 7 days 12 tablet 1    allopurinol (ZYLOPRIM) 100 MG tablet Take 1 tablet (100 mg) by mouth daily 90 tablet 1    anakinra (KINERET) 100 MG/0.67ML SOSY injection Inject 0.67 mLs (100 mg) Subcutaneous daily 20.1 mL 3    aspirin (ASA) 81 MG EC tablet Take 1 tablet (81 mg) by mouth 2 times daily 60 tablet 0    atorvastatin (LIPITOR) 10 MG tablet TAKE 1/2 TABLET BY MOUTH EVERY DAY 45 tablet 3    azithromycin (ZITHROMAX) 250 MG tablet TAKE 1 TABLET BY MOUTH EVERY DAY 30 tablet 5    BD PEN NEEDLE JANE 2ND GEN 32G X 4 MM miscellaneous USE TO TEST 4 TIMES A  each 1    benzonatate (TESSALON) 100 MG capsule Take 1 capsule (100 mg) by mouth 3 times daily as needed for cough 25 capsule 0    blood glucose (NO BRAND SPECIFIED) lancets standard Use to test blood sugar 3 times daily or as directed 100 lancet 3    BREO ELLIPTA 100-25 MCG/ACT inhaler TAKE 1 PUFF BY MOUTH EVERY DAY 1 each 11    cevimeline (EVOXAC) 30 MG capsule Take 1 capsule (30 mg) by mouth 3 times daily as needed      Cholecalciferol (VITAMIN D3) 50 MCG (2000 UT) CAPS TAKE 100 MCG BY MOUTH DAILY (TAKE 2 TABLET (50 MCG) BY MOUTH DAILY - ORAL) 180 capsule 0    COMPOUNDED NON-CONTROLLED SUBSTANCE (CMPD RX) - PHARMACY TO MIX COMPOUNDED MEDICATION Estriol 1 mg/g Apply small amount to finger and apply to inside vagina daily for 2 weeks then twice weekly Route: vaginally Dispense 30 grams 11 refills 30 g 11    cyanocobalamin (VITAMIN B-12) 1000 MCG tablet Take 1 tablet (1,000 mcg) by mouth three times a week      escitalopram (LEXAPRO) 20 MG tablet TAKE 1 TABLET BY MOUTH EVERY DAY 90 tablet 1     ferrous gluconate (FERGON) 324 (38 Fe) MG tablet Take 1 tablet (324 mg) by mouth three times a week      fluticasone (FLONASE) 50 MCG/ACT nasal spray SPRAY 1-2 SPRAYS INTO BOTH NOSTRILS DAILY AS NEEDED FOR ALLERGIES 16 mL 11    hydroxychloroquine (PLAQUENIL) 200 MG tablet Take 1 tablet (200 mg) by mouth daily Get annual eye exams for hydroxychloroquine (Plaquenil) monitoring and fax to [phone] 90 tablet 1    insulin glargine (LANTUS PEN) 100 UNIT/ML pen Inject 10 Units Subcutaneous At Bedtime 15 mL 1    insulin lispro (HUMALOG KWIKPEN) 100 UNIT/ML (1 unit dial) KWIKPEN Inject Subcutaneous 3 times daily (before meals) Sliding scale insulin; tests BG three times day  If BG <150, no insulin given   If -200 take 2 units  If -250 take 4 units  If -300 take 6 units  If -350 take 10 units  If BG >350 take 14 units      ketoconazole (NIZORAL) 2 % external cream Apply topically 2 times daily as needed for itching 60 g 1    levocetirizine (XYZAL) 5 MG tablet Take 1 tablet (5 mg) by mouth every evening 90 tablet 1    lidocaine (LIDODERM) 5 % patch APPLY PATCH TO PAINFUL AREA FOR UP TO 12 H WITHIN A 24 H PERIOD. REMOVE AFTER 12 HOURS. (Patient taking differently: Apply patch to painful area for up to 12 h within a 24 h period.  Remove after 12 hours.) 30 patch 2    loratadine (CLARITIN) 10 MG tablet TAKE 1 TABLET BY MOUTH EVERY DAY 90 tablet 3    methocarbamol (ROBAXIN) 750 MG tablet Take 1 tablet (750 mg) by mouth 3 times daily as needed for muscle spasms 90 tablet 3    metoprolol succinate ER (TOPROL XL) 50 MG 24 hr tablet Take 1 tablet (50 mg) by mouth 2 times daily 180 tablet 3    montelukast (SINGULAIR) 10 MG tablet TAKE 1 TABLET BY MOUTH EVERYDAY AT BEDTIME 90 tablet 1    OZEMPIC, 1 MG/DOSE, 4 MG/3ML pen INJECT 1 MG SUBCUTANEOUS ONCE A WEEK 9 mL 1    pantoprazole (PROTONIX) 40 MG EC tablet Take 1 tablet (40 mg) by mouth daily (replaces famotidine- should stop taking famotidine) 90 tablet 3     polyethylene glycol (MIRALAX) 17 g packet Take 17 g by mouth daily 10 packet 0    potassium chloride ER (KLOR-CON) 20 MEQ CR tablet Take 2 tablets (40 mEq) by mouth every morning AND 1 tablet (20 mEq) every evening. 270 tablet 3    predniSONE (DELTASONE) 10 MG tablet For gout flares, take the prednisone as 40-30-20-10 mg a day, each course x 3 days then go back to 5 mg a day 30 tablet 3    predniSONE (DELTASONE) 5 MG tablet Take 1 tablet (5 mg) by mouth daily 90 tablet 1    torsemide (DEMADEX) 20 MG tablet Take 2 tablets (40 mg) by mouth every morning AND 1 tablet (20 mg) every evening. 270 tablet 3    traZODone (DESYREL) 50 MG tablet Take 1-2 tablets ( mg) by mouth At Bedtime       No facility-administered medications prior to visit.     Allergies:   Allergies   Allergen Reactions    Amoxicillin-Pot Clavulanate Nausea and Vomiting    Amoxicillin-Pot Clavulanate     Codeine Nausea and Vomiting     PN: LW Reaction: HIVES    Penicillins Nausea and Vomiting     PN: LW Reaction: GI Upset    Phenobarbital Itching    Seasonal Allergies         Past Medical History:  Alcohol abuse, in remission.   Allergic rhinitis.   Antiplatelet long-term use.   Atrial fibrillation.   Cardiomegaly.   Osteopenia.   Diverticulosis.   Benign essential hypertension.   GERD.   Insomnia.   Irregular heart beat.   Lumbago.   Major depressive disorder, recurrent episode, moderate.   ANGELICA.  Osteoarthrosis.   Sjogren's syndrome.   Sleep apnea.   Tobacco use disorder.   TMJ disorder.   RLS.  Major depressive disorder.   Hypertension.   Sciatica.   Colouterine fistula.   Left ventricular hypertrophy.   Breat fibroadenoma.   DVT.   Fatty liver disease, nonalcoholic.   Rib pain.   Neck mass.   Interstitial lung disease.   Acute and chronic respiratory failure with hypoxia.   Type II diabetes mellitus.   Hyperlipidemia.   Inflammatory arthritis.      Past Surgical History:  Back surgery.   Left breast biopsy.   Appendectomy.   Exploratory  laparotomy.   Cardiac surgery.   Cholecystectomy.   Left colectomy.   Total abdominal hysterectomy with bilateral salpingo-oophorectomy.   Insert ureter stent.   Flexible sigmoidoscopy.   Ileostomy takedown.     Family History:   Mother: Positive for CAD, heart disease, hypertension, cerebrovascular disease, hyperlipidemia.   Father: Positive for alcohol/drug abuse, Alzheimer disease, dementia, hypertension, hyperlipidemia.   Sister: Positive for CAD, hypertension, and colorectal cancer.   Sister: Positive for hypertension and hyperlipidemia.   Sister: Positive for diabetes, lung cancer, asthma, and heart disease.   Brother: Positive for Parkinsonism and substance abuse.   Brother: Positive for hypertension, diverticulitis, and prostate cancer.   Daughter: Positive for breast cancer.        Social History:   She is a former smoker; quit in 2011. She has a history of alcohol use but stopped drinking in 1986.      Physical Exam:     /49 (BP Location: Right arm, Patient Position: Sitting, Cuff Size: Adult Large)   Pulse 57   Wt 80.2 kg (176 lb 12.8 oz)   LMP  (LMP Unknown)   SpO2 96%   BMI 28.54 kg/m      Constitutional: NAD, very pleasant, sister Nghia present   Eyes: Normal EOM, conjunctiva, sclera  Chest: CTAB  CV: no M/R/G, RRR  Abdomen: soft, NT  MSK: no active synovitis or joint tenderness. Able to get out of chair and ambulate with minimal help from the cane  Skin: No rash  Neuro: grossly non-focal  Psych: Normal affect.    Images:  CT Chest w/o contrast (7/25/18):  Findings remain most consistent with small airway disease  and/or nonclassifiable interstitial lung disease and are not  significantly changed from the March 2017 comparison.    Per radiology.    Laboratory:       Latest Ref Rng & Units 8/1/2023     3:08 PM 9/7/2023    12:39 PM 10/11/2023     2:25 PM   RHEUM RESULTS   Albumin 3.5 - 5.2 g/dL 4.0      ALT 0 - 50 U/L 12      AST 0 - 45 U/L 18      Creatinine 0.51 - 0.95 mg/dL 0.99  0.91  0.86     GFR Estimate >60 mL/min/1.73m2 56  62  67    Hematocrit 35.0 - 47.0 % 39.2  39.6  38.9    Hemoglobin 11.7 - 15.7 g/dL 12.8  13.0  12.7    WBC 4.0 - 11.0 10e3/uL 9.0  11.7  6.9    RBC Count 3.80 - 5.20 10e6/uL 4.36  4.40  4.29    RDW 10.0 - 15.0 % 13.3  14.1  14.5    MCHC 31.5 - 36.5 g/dL 32.7  32.8  32.6    MCV 78 - 100 fL 90  90  91    Platelet Count 150 - 450 10e3/uL 200  227  177        Rheumatoid Factor   Date Value Ref Range Status   07/24/2015 <20 <20 IU/mL Final   ,  ,   Cyclic Cit Pept IgG/IgA   Date Value Ref Range Status   07/24/2015 <20  Interpretation:  Negative   <20 UNITS Final   ,  ,   Scleroderma Antibody Scl-70 CECILIA IgG   Date Value Ref Range Status   07/24/2015  0.0 - 0.9 AI Final    <0.2  Negative   Antibody index (AI) values reflect qualitative changes in antibody   concentration that cannot be directly associated with clinical condition or   disease state.       SSA (Ro) (CECILIA) Antibody, IgG   Date Value Ref Range Status   07/24/2015 1.6 (H) 0.0 - 0.9 AI Final     Comment:     Positive   Antibody index (AI) values reflect qualitative changes in antibody   concentration that cannot be directly associated with clinical condition or   disease state.       SSB (La) (CECILIA) Antibody, IgG   Date Value Ref Range Status   07/24/2015  0.0 - 0.9 AI Final    <0.2  Negative   Antibody index (AI) values reflect qualitative changes in antibody   concentration that cannot be directly associated with clinical condition or   disease state.       ,  ,   RG Screen by EIA   Date Value Ref Range Status   07/24/2015 <1.0  Interpretation:  Negative   <1.0 Final   ,  ,  ,  ,  ,  ,  ,  ,  ,  ,  ,  ,  ,  ,  ,  ,  ,  ,   Neutrophil Cytoplasmic IgG Antibody   Date Value Ref Range Status   07/24/2015   Final    <1:20  Reference range: <1:20  (Note)  The ANCA IFA is <1:20; therefore, no further testing will  be performed.  INTERPRETIVE INFORMATION: Anti-Neutrophil Cyto Ab, IgG  Neutrophil Cytoplasmic Antibodies (C-ANCA =  granular  cytoplasmic staining, P-ANCA = perinuclear staining) are  found in the serum of over 90 percent of patients with  certain necrotizing systemic vasculitides, and usually in  less than 5 percent of patients with collagen vascular  disease or arthritis.  Performed by [facility],  [facility] [place] [address] [phone]  [email or web address], Burke Mckeon MD, Lab. Director       ,  ,  ,  ,  ,  ,  ,   IGG   Date Value Ref Range Status   07/24/2015 1320 695 - 1620 mg/dL Final   ,  ,  ,  ,  ,   Scleroderma Antibody Scl-70 CECILIA IgG   Date Value Ref Range Status   07/24/2015  0.0 - 0.9 AI Final    <0.2  Negative   Antibody index (AI) values reflect qualitative changes in antibody   concentration that cannot be directly associated with clinical condition or   disease state.          CBC RESULTS: 6/4/2021   Lab Test 06/04/21  1422   WBC 8.6   RBC 4.46   HGB 13.4   HCT 42.1   MCV 94   MCH 30.0   MCHC 31.8   RDW 15.5*        Last Comprehensive Metabolic Panel:  Sodium   Date Value Ref Range Status   10/11/2023 135 135 - 145 mmol/L Final     Comment:     Reference intervals for this test were updated on 09/26/2023 to more accurately reflect our healthy population. There may be differences in the flagging of prior results with similar values performed with this method. Interpretation of those prior results can be made in the context of the updated reference intervals.    06/04/2021 137 133 - 144 mmol/L Final     Potassium   Date Value Ref Range Status   10/11/2023 3.8 3.4 - 5.3 mmol/L Final   08/23/2022 4.1 3.4 - 5.3 mmol/L Final   06/04/2021 4.0 3.4 - 5.3 mmol/L Final     Chloride   Date Value Ref Range Status   10/11/2023 98 98 - 107 mmol/L Final   08/23/2022 107 94 - 109 mmol/L Final   06/04/2021 106 94 - 109 mmol/L Final     Carbon Dioxide   Date Value Ref Range Status   06/04/2021 25 20 - 32 mmol/L Final     Carbon Dioxide (CO2)   Date Value Ref Range Status   10/11/2023 28 22 - 29 mmol/L Final    08/23/2022 22 20 - 32 mmol/L Final     Anion Gap   Date Value Ref Range Status   10/11/2023 9 7 - 15 mmol/L Final   08/23/2022 8 3 - 14 mmol/L Final   06/04/2021 6 3 - 14 mmol/L Final     Glucose   Date Value Ref Range Status   10/11/2023 208 (H) 70 - 99 mg/dL Final   08/23/2022 198 (H) 70 - 99 mg/dL Final   06/04/2021 142 (H) 70 - 99 mg/dL Final     GLUCOSE BY METER POCT   Date Value Ref Range Status   06/05/2023 259 (H) 70 - 99 mg/dL Final     Urea Nitrogen   Date Value Ref Range Status   10/11/2023 9.9 8.0 - 23.0 mg/dL Final   08/23/2022 17 7 - 30 mg/dL Final   06/04/2021 14 7 - 30 mg/dL Final     Creatinine   Date Value Ref Range Status   10/11/2023 0.86 0.51 - 0.95 mg/dL Final   06/04/2021 1.11 (H) 0.52 - 1.04 mg/dL Final     GFR Estimate   Date Value Ref Range Status   10/11/2023 67 >60 mL/min/1.73m2 Final   06/04/2021 47 (L) >60 mL/min/[1.73_m2] Final     Comment:     Non  GFR Calc  Starting 12/18/2018, serum creatinine based estimated GFR (eGFR) will be   calculated using the Chronic Kidney Disease Epidemiology Collaboration   (CKD-EPI) equation.       Calcium   Date Value Ref Range Status   10/11/2023 8.8 8.8 - 10.2 mg/dL Final   06/04/2021 8.8 8.5 - 10.1 mg/dL Final       ESR 6/4/2021: 10.0    CRP 6/4/2021: 3.0    Uric acid 6/4/2021: 6.2    Component      Latest Ref Rng & Units 2/11/2022   WBC      4.0 - 11.0 10e3/uL 11.5 (H)   RBC Count      3.80 - 5.20 10e6/uL 3.47 (L)   Hemoglobin      11.7 - 15.7 g/dL 9.1 (L)   Hematocrit      35.0 - 47.0 % 30.2 (L)   MCV      78 - 100 fL 87   MCH      26.5 - 33.0 pg 26.2 (L)   MCHC      31.5 - 36.5 g/dL 30.1 (L)   RDW      10.0 - 15.0 % 15.1 (H)   Platelet Count      150 - 450 10e3/uL 283   % Neutrophils      % 76   % Lymphocytes      % 9   % Monocytes      % 11   % Eosinophils      % 2   % Basophils      % 1   % Immature Granulocytes      % 1   NRBCs per 100 WBC      <1 /100 0   Absolute Neutrophils      1.6 - 8.3 10e3/uL 8.9 (H)   Absolute  Lymphocytes      0.8 - 5.3 10e3/uL 1.0   Absolute Monocytes      0.0 - 1.3 10e3/uL 1.2   Absolute Eosinophils      0.0 - 0.7 10e3/uL 0.2   Absolute Basophils      0.0 - 0.2 10e3/uL 0.1   Absolute Immature Granulocytes      <=0.4 10e3/uL 0.1   Absolute NRBCs      10e3/uL 0.0   Albumin Fraction      3.7 - 5.1 g/dL 3.9   Alpha 1 Fraction      0.2 - 0.4 g/dL 0.4   Alpha 2 Fraction      0.5 - 0.9 g/dL 0.8   Beta Fraction      0.6 - 1.0 g/dL 1.0   Gamma Fraction      0.7 - 1.6 g/dL 0.8   Monoclonal Peak      <=0.0 g/dL 0.0   ELP Interpretation:       Essentially normal electrophoretic pattern. No obvious monoclonal proteins seen. Pathologic significance requires clinical correlation. Meghan Brothers M.D., Ph.D.   Iron      35 - 180 ug/dL 17 (L)   Iron Binding Cap      240 - 430 ug/dL 354   Iron Saturation Index      15 - 46 % 5 (L)   Creatinine      0.52 - 1.04 mg/dL 1.00   GFR Estimate      >60 mL/min/1.73m2 56 (L)   % Retic      0.5 - 2.0 % 2.7 (H)   Absolute Retic      0.025 - 0.095 10e6/uL 0.090   ALT      0 - 50 U/L 17   Albumin      3.4 - 5.0 g/dL 3.3 (L)   AST      0 - 45 U/L 15   Sed Rate      0 - 30 mm/hr 36 (H)   CRP Inflammation      0.0 - 8.0 mg/L 12.5 (H)   Uric Acid      2.6 - 6.0 mg/dL 6.1 (H)   Immunofixation ELP       No monoclonal protein seen on immunofixation. Pathologic significance requires clinical correlation.  Meghan Brothers M.D., Ph.D.   Immunofix ELP Urine       No monoclonal protein seen on immunofixation. Pathologic significance requires clinical correlation.  Meghan Brothers M.D., Ph.D.   Total Protein Serum for ELP      6.8 - 8.8 g/dL 6.9   N-Terminal Pro Bnp      0 - 450 pg/mL 239   Haptoglobin      32 - 197 mg/dL 149   Vitamin B12      193 - 986 pg/mL 162 (L)   Ferritin      8 - 252 ng/mL 14         Zulma Lopez MD

## 2023-11-16 ENCOUNTER — OFFICE VISIT (OUTPATIENT)
Dept: PULMONOLOGY | Facility: CLINIC | Age: 83
End: 2023-11-16
Attending: INTERNAL MEDICINE
Payer: MEDICARE

## 2023-11-16 VITALS — SYSTOLIC BLOOD PRESSURE: 138 MMHG | DIASTOLIC BLOOD PRESSURE: 78 MMHG | HEART RATE: 55 BPM | OXYGEN SATURATION: 95 %

## 2023-11-16 DIAGNOSIS — J44.89 FOLLICULAR BRONCHIOLITIS (H): Primary | ICD-10-CM

## 2023-11-16 DIAGNOSIS — J44.89 FOLLICULAR BRONCHIOLITIS (H): ICD-10-CM

## 2023-11-16 PROCEDURE — G0463 HOSPITAL OUTPT CLINIC VISIT: HCPCS | Performed by: INTERNAL MEDICINE

## 2023-11-16 PROCEDURE — 94726 PLETHYSMOGRAPHY LUNG VOLUMES: CPT | Performed by: INTERNAL MEDICINE

## 2023-11-16 PROCEDURE — 94729 DIFFUSING CAPACITY: CPT | Performed by: INTERNAL MEDICINE

## 2023-11-16 PROCEDURE — 94375 RESPIRATORY FLOW VOLUME LOOP: CPT | Performed by: INTERNAL MEDICINE

## 2023-11-16 PROCEDURE — 99214 OFFICE O/P EST MOD 30 MIN: CPT | Mod: 25 | Performed by: INTERNAL MEDICINE

## 2023-11-16 NOTE — LETTER
"    11/16/2023         RE: Betty Tee  [address]  [facility] [address]        Dear Colleague,    Thank you for referring your patient, Betty Tee, to the [facility] [place]. Please see a copy of my visit note below.    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  She had mild ILD on a previous chest CT scan in 2015, but follow-up chest CT scan in 2018 showed improvement with no obvious ILD present on the CT scan at that time.  The follicular bronchiolitis changes were still present.  She has been on prednisone for her Sjogren's for many years, and that is managed by Dr. Lopez in rheumatology. Her medications for follicular bronchiolitis include current prednisone dose 5 mg daily, azithromycin 250 mg daily, montelukast daily, and Breo Ellipta inhaler 100-25. These are all anti-inflammatories for her follicular bronchiolitis.     Sister Betty had hip replacement on May 31.  She reports that the surgery went well.  She is continuing physical therapy.  However, her activity is limited by lightheadedness and the feeling that her \"brain is not working.\"  She has not discussed this with her primary care provider.  She denies cough or wheezing.  She reports that her oxygen saturation is as low as 92% during physical therapy without supplemental oxygen.  One of her fellow riley is with her in clinic today, and she is the one who arranges the daily medications for Sister Betty.    She did receive the flu vaccine.  She has not yet received the RSV or COVID-19 vaccines but plans on getting them.  She continues to use CPAP at nighttime.          Current Outpatient " Medications   Medication    ACCU-CHEK SHANNON PLUS test strip    acetaminophen (TYLENOL) 500 MG tablet    acyclovir (ZOVIRAX) 400 MG tablet    acyclovir (ZOVIRAX) 5 % external ointment    albuterol (PROAIR HFA/PROVENTIL HFA/VENTOLIN HFA) 108 (90 Base) MCG/ACT inhaler    alendronate (FOSAMAX) 70 MG tablet    allopurinol (ZYLOPRIM) 100 MG tablet    anakinra (KINERET) 100 MG/0.67ML SOSY injection    aspirin (ASA) 81 MG EC tablet    atorvastatin (LIPITOR) 10 MG tablet    azithromycin (ZITHROMAX) 250 MG tablet    BD PEN NEEDLE JANE 2ND GEN 32G X 4 MM miscellaneous    benzonatate (TESSALON) 100 MG capsule    blood glucose (NO BRAND SPECIFIED) lancets standard    BREO ELLIPTA 100-25 MCG/ACT inhaler    cevimeline (EVOXAC) 30 MG capsule    Cholecalciferol (VITAMIN D3) 50 MCG (2000 UT) CAPS    COMPOUNDED NON-CONTROLLED SUBSTANCE (CMPD RX) - PHARMACY TO MIX COMPOUNDED MEDICATION    cyanocobalamin (VITAMIN B-12) 1000 MCG tablet    escitalopram (LEXAPRO) 20 MG tablet    ferrous gluconate (FERGON) 324 (38 Fe) MG tablet    fluticasone (FLONASE) 50 MCG/ACT nasal spray    hydroxychloroquine (PLAQUENIL) 200 MG tablet    insulin glargine (LANTUS PEN) 100 UNIT/ML pen    insulin lispro (HUMALOG KWIKPEN) 100 UNIT/ML (1 unit dial) KWIKPEN    ketoconazole (NIZORAL) 2 % external cream    levocetirizine (XYZAL) 5 MG tablet    lidocaine (LIDODERM) 5 % patch    loratadine (CLARITIN) 10 MG tablet    methocarbamol (ROBAXIN) 750 MG tablet    metoprolol succinate ER (TOPROL XL) 50 MG 24 hr tablet    montelukast (SINGULAIR) 10 MG tablet    OZEMPIC, 1 MG/DOSE, 4 MG/3ML pen    pantoprazole (PROTONIX) 40 MG EC tablet    polyethylene glycol (MIRALAX) 17 g packet    potassium chloride ER (KLOR-CON) 20 MEQ CR tablet    predniSONE (DELTASONE) 10 MG tablet    predniSONE (DELTASONE) 5 MG tablet    torsemide (DEMADEX) 20 MG tablet    traZODone (DESYREL) 50 MG tablet     No current facility-administered medications for this visit.     Allergies   Allergen  Reactions    Amoxicillin-Pot Clavulanate Nausea and Vomiting    Amoxicillin-Pot Clavulanate     Codeine Nausea and Vomiting     PN: LW Reaction: HIVES    Penicillins Nausea and Vomiting     PN: LW Reaction: GI Upset    Phenobarbital Itching    Seasonal Allergies      Past Medical History:   Diagnosis Date    Alcohol abuse, in remission     Allergic rhinitis, cause unspecified     allegra helps when she takes it    Antiplatelet or antithrombotic long-term use     Atrial fibrillation (H)     in hosp in 11/11 after surgery w/ fluid overload    Cardiomegaly     LVH on stress echo- cardiac w/u at [facility] ER- neg CT scan for PE, neg stress echo in 8/06    Chest pain, unspecified     Congestive heart failure (H)     Depressive disorder     Diabetes (H)     Disorder of bone and cartilage, unspecified     osteopenia (had been on prempro), improved on 6/06 dexa, stable dexa 11/10    Diverticulosis of colon (without mention of hemorrhage)     last episode yrs ago    Essential hypertension, benign     Follicular bronchiolitis (H)     associated with Sjogrens, dx by chest CT showing mosaic attenuation and air trapping    Gastro-oesophageal reflux disease     ILD (interstitial lung disease) (H)     associated with Sjogrens, also has mildly elevated IgG4, first noted on chest CT 2015 (mild changes) and also has small airways disease; ILD improved on follow up chest CT 2018.    Insomnia, unspecified     weaned off clonazepam    Irregular heart beat     Lumbago 07/2009    MRI with [facility], now seeing Dr. Cain for sciatic sx's    Major depressive disorder, recurrent episode, moderate (H)     Obstructive sleep apnea     uses cpap    Osteoarthrosis, unspecified whether generalized or localized, unspecified site     Sjogren's syndrome (H24)     + RG and SSA and lip bx    Sleep apnea     uses a cpap machine    Tobacco use disorder     chantix in 9/07, started again in 6/08, working       Past Surgical History:   Procedure Laterality Date     APPENDECTOMY      ARTHROPLASTY HIP Right 5/31/2023    Procedure: Right total hip arthroplasty;  Surgeon: Thomas Shannon MD;  Location: [facility] OR    BACK SURGERY  1962    BACK SURGERY  1962    BIOPSY BREAST  09/27/2002    Biopsy Left Breast    BIOPSY BREAST Left 09/27/2002    BREAST SURGERY      CARDIAC SURGERY      CARDIAC SURGERY      CHOLECYSTECTOMY  1990's?    CHOLECYSTECTOMY      COLECTOMY LEFT  11/07/2011    Procedure:COLECTOMY LEFT; Laparoscopic mobilization of splenic flexture, sigmoid colectomy, coloprotoscopy, loop illeostomy; Surgeon:KC SIDDIQI; Location:[facility] OR    COLECTOMY LEFT  11/07/2011    COLONOSCOPY  1990,s    COLONOSCOPY N/A 5/3/2022    Procedure: COLONOSCOPY;  Surgeon: Guru Winsome Dias MD;  Location: [facility] GI    CV LEFT ATRIAL APPENDAGE CLOSURE N/A 9/26/2022    Procedure: Left Atrial Appendage Closure;  Surgeon: Uzair Crews MD;  Location:  HEART CARDIAC CATH LAB    ENT SURGERY      EYE SURGERY  2012    FLEXIBLE SIGMOIDOSCOPY  11/03/2011    HYSTERECTOMY TOTAL ABDOMINAL, BILATERAL SALPINGO-OOPHORECTOMY, COMBINED  11/07/2011    Procedure:COMBINED HYSTERECTOMY TOTAL ABDOMINAL, BILATERAL SALPINGO-OOPHORECTOMY; total abdominal hysterectomy, bilateral salpingo-oophorectomy; Surgeon:ALETA MANUEL; Location:[facility] OR    HYSTERECTOMY TOTAL ABDOMINAL, BILATERAL SALPINGO-OOPHORECTOMY, COMBINED  11/07/2011    ILEOSTOMY  02/01/2012    takedown loop ileostomy     INSERT STENT URETER  11/07/2011    Procedure:INSERT STENT URETER; Placement of Bilateral Ureteral Stents ; Surgeon:PRANEETH BRYANT; Location:[facility] OR    SIGMOIDECTOMY  02/01/2012    Dr. Siddiqi, Sigmoidectomy for diverticular abscess, iliostomy afterwards until repair    SIGMOIDOSCOPY FLEXIBLE  11/03/2011    Procedure:SIGMOIDOSCOPY FLEXIBLE; Flexible Sigmoidoscopy; Surgeon:CK SIDDIQI; Location:[facility] OR    TAKEDOWN ILEOSTOMY  02/01/2012    Procedure:TAKEDOWN ILEOSTOMY; Takedown Loop Ileostomy ; Surgeon:CK SIDDIQI;  Location:[facility] OR    URETERAL STENT PLACEMENT  2011    ZZC APPENDECTOMY  's?    ZZC NONSPECIFIC PROCEDURE  2005    exploratory abd lap, adhesions, resolved RLQ pain, diverticulitis episodes       Social History     Socioeconomic History    Marital status: Single     Spouse name: Not on file    Number of children: 0    Years of education: Ed Spec De    Highest education level: Not on file   Occupational History    Occupation: Professor     Employer: [employer]     Comment: [employer]   Tobacco Use    Smoking status: Former     Packs/day: .5     Types: Cigarettes     Start date:      Quit date: 2011     Years since quittin.3    Smokeless tobacco: Never    Tobacco comments:      ppd   Vaping Use    Vaping Use: Never used   Substance and Sexual Activity    Alcohol use: No     Alcohol/week: 0.0 standard drinks of alcohol     Comment: In recovery beginning     Drug use: No    Sexual activity: Never   Other Topics Concern    Parent/sibling w/ CABG, MI or angioplasty before 65F 55M? Yes   Social History Narrative                 Social Determinants of Health     Financial Resource Strain: Low Risk  (10/6/2023)    Financial Resource Strain     Within the past 12 months, have you or your family members you live with been unable to get utilities (heat, electricity) when it was really needed?: No   Food Insecurity: Low Risk  (10/6/2023)    Food Insecurity     Within the past 12 months, did you worry that your food would run out before you got money to buy more?: No     Within the past 12 months, did the food you bought just not last and you didn t have money to get more?: No   Transportation Needs: Low Risk  (10/6/2023)    Transportation Needs     Within the past 12 months, has lack of transportation kept you from medical appointments, getting your medicines, non-medical meetings or appointments, work, or from getting things that you need?: No    Physical Activity: Not on file   Stress: Not on file   Social Connections: Not on file   Interpersonal Safety: Not on file   Housing Stability: Low Risk  (10/6/2023)    Housing Stability     Do you have housing? : Yes     Are you worried about losing your housing?: No       Family History   Problem Relation Age of Onset    C.A.D. Mother 63        MI- first at age 63    Heart Disease Mother     Hypertension Mother     Cerebrovascular Disease Mother     Hyperlipidemia Mother     Coronary Artery Disease Mother         MI-first at age 63    Other - See Comments Mother         cerebrovascular disease     Alcohol/Drug Father     Alzheimer Disease Father     Dementia Father     Hypertension Father     Hyperlipidemia Father     Substance Abuse Father     Diabetes Sister     Hypertension Sister     Hyperlipidemia Sister     Substance Abuse Sister     Asthma Sister     C.A.D. Sister 52        Minor MI- age 50's    Heart Disease Sister     Hypertension Sister     Hypertension Sister     Cancer - colorectal Sister 48        Late 40's early 50's    Gastrointestinal Disease Sister         Diverticulitis    Lipids Sister     Lipids Sister     Diabetes Sister     Heart Disease Sister         CHF    Cancer Sister         lung, smoker    Substance Abuse Sister     Asthma Sister     Cancer Sister     Coronary Artery Disease Sister         minor MI-age 50's    Heart Disease Sister     Hypertension Sister     Hypertension Sister     Colon Cancer Sister     Diverticulitis Sister     Lung Cancer Sister     Heart Disease Sister         CHF    Diabetes Sister     Asthma Sister     Hypertension Brother     Prostate Cancer Brother 74        Dx'd age 74    Gastrointestinal Disease Brother         Diverticulitis    Parkinsonism Brother     Substance Abuse Brother     Prostate Cancer Brother     Hyperlipidemia Brother     Hypertension Brother     Prostate Cancer Brother     Diverticulitis Brother     Parkinsonism Brother     Breast Cancer  Daughter     Breast Cancer Daughter     Diabetes Other     Hypertension Other     Anesthesia Reaction No family hx of     Deep Vein Thrombosis (DVT) No family hx of          Vitals: /78   Pulse 55   LMP  (LMP Unknown)   SpO2 95%     Exam:   GENERAL APPEARANCE: Well developed, well nourished, alert, and in no apparent distress.  RESP: good air flow throughout.  No crackles. No rhonchi. No wheezes.  No Inspiratory squeaks.  CV: Normal S1, S2, regular rhythm, normal rate. No murmur.  No LE edema.   MS: extremities normal. No clubbing. No cyanosis.  SKIN: no rash on limited exam.  NEURO: Mentation intact, speech normal.  Sitting in wheelchair.  PSYCH: mentation appears normal. and affect normal/bright.    Results:  Recent Results (from the past 168 hour(s))   General PFT Lab (Please always keep checked)    Collection Time: 11/16/23 11:43 AM   Result Value Ref Range    FVC-Pred 2.37 L    FVC-Pre 2.02 L    FVC-%Pred-Pre 85 %    FEV1-Pre 1.56 L    FEV1-%Pred-Pre 86 %    FEV1FVC-Pred 77 %    FEV1FVC-Pre 77 %    FEFMax-Pred 4.68 L/sec    FEFMax-Pre 4.93 L/sec    FEFMax-%Pred-Pre 105 %    FEF2575-Pred 1.43 L/sec    FEF2575-Pre 1.31 L/sec    JEZ1243-%Pred-Pre 91 %    ExpTime-Pre 8.25 sec    FIFMax-Pre 2.49 L/sec    VC-Pred 3.07 L    VC-Pre 1.92 L    VC-%Pred-Pre 62 %    IC-Pred 1.74 L    IC-Pre 1.69 L    IC-%Pred-Pre 96 %    ERV-Pred 0.87 L    ERV-Pre 0.23 L    ERV-%Pred-Pre 26 %    FEV1FEV6-Pred 77 %    FEV1FEV6-Pre 77 %    FRCPleth-Pred 2.81 L    FRCPleth-Pre 2.39 L    FRCPleth-%Pred-Pre 85 %    RVPleth-Pred 2.35 L    RVPleth-Pre 2.17 L    RVPleth-%Pred-Pre 92 %    TLCPleth-Pred 5.19 L    TLCPleth-Pre 4.09 L    TLCPleth-%Pred-Pre 78 %    DLCOunc-Pred 18.79 ml/min/mmHg    DLCOunc-Pre 17.42 ml/min/mmHg    DLCOunc-%Pred-Pre 92 %    VA-Pre 3.23 L    VA-%Pred-Pre 69 %    FEV1SVC-Pred 59 %    FEV1SVC-Pre 81 %       I reviewed pulmonary function test that was performed today.  This shows mild restriction with normal  diffusing capacity.  Lung function is stable.  Overall, lung function has improved significantly compared to 2018.    I reviewed results with the patient.      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    1.  Follicular bronchiolitis associated with Sjogren's.  PFT is stable and overall improved compared to 2018.  The improvement correlates with treatment of the follicular bronchiolitis.  Her pulmonary symptoms are stable, and we will continue prednisone 5 mg daily, daily azithromycin, montelukast and Breo Ellipta inhaler.  These are anti-inflammatories for the follicular bronchiolitis.  The prednisone dose is managed by Dr. Webster in rheumatology.  Her main complaint is a feeling of lightheadedness and that her brain is not working.  I recommended that she discuss this with her primary care provider.  It is unclear to me if she is referring to memory loss or the fact that it takes her longer to think of things or to initiate an action.  She will return in 6 months with full PFT and a 6-minute walk test.  2.  Healthcare maintenance.  Her sister robert who is with her today in clinic is going to schedule an appointment for RSV vaccine and the COVID-19 updated booster.  I spent 31 minutes reviewing chart, talking with and examining patient, reviewing test results, formulating plan and documentation on the day of the encounter (excluding PFT review).              Again, thank you for allowing me to participate in the care of your patient.        Sincerely,        Maryjane Tillman MD

## 2023-11-16 NOTE — PROGRESS NOTES
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  She had mild ILD on a previous chest CT scan in 2015, but follow-up chest CT scan in 2018 showed improvement with no obvious ILD present on the CT scan at that time.  The follicular bronchiolitis changes were still present.  She has been on prednisone for her Sjogren's for many years, and that is managed by Dr. Lopez in rheumatology. Her medications for follicular bronchiolitis include current prednisone dose 5 mg daily, azithromycin 250 mg daily, montelukast daily, and Breo Ellipta inhaler 100-25. These are all anti-inflammatories for her follicular bronchiolitis.     Sister Betty had hip replacement on May 31.  She reports that the surgery went well.  She is continuing physical therapy.  However, her activity is limited by lightheadedness and the feeling that her \"brain is not working.\"  She has not discussed this with her primary care provider.  She denies cough or wheezing.  She reports that her oxygen saturation is as low as 92% during physical therapy without supplemental oxygen.  One of her fellow nuns is with her in clinic today, and she is the one who arranges the daily medications for Sister Betty.    She did receive the flu vaccine.  She has not yet received the RSV or COVID-19 vaccines but plans on getting them.  She continues to use CPAP at nighttime.          Current Outpatient Medications   Medication    ACCU-CHEK SHANNON PLUS test strip    acetaminophen (TYLENOL) 500 MG tablet    acyclovir (ZOVIRAX) 400 MG tablet    acyclovir (ZOVIRAX) 5 % external ointment    albuterol (PROAIR HFA/PROVENTIL HFA/VENTOLIN HFA) 108 (90 Base) MCG/ACT inhaler    alendronate (FOSAMAX) 70 MG tablet    " allopurinol (ZYLOPRIM) 100 MG tablet    anakinra (KINERET) 100 MG/0.67ML SOSY injection    aspirin (ASA) 81 MG EC tablet    atorvastatin (LIPITOR) 10 MG tablet    azithromycin (ZITHROMAX) 250 MG tablet    BD PEN NEEDLE JANE 2ND GEN 32G X 4 MM miscellaneous    benzonatate (TESSALON) 100 MG capsule    blood glucose (NO BRAND SPECIFIED) lancets standard    BREO ELLIPTA 100-25 MCG/ACT inhaler    cevimeline (EVOXAC) 30 MG capsule    Cholecalciferol (VITAMIN D3) 50 MCG (2000 UT) CAPS    COMPOUNDED NON-CONTROLLED SUBSTANCE (CMPD RX) - PHARMACY TO MIX COMPOUNDED MEDICATION    cyanocobalamin (VITAMIN B-12) 1000 MCG tablet    escitalopram (LEXAPRO) 20 MG tablet    ferrous gluconate (FERGON) 324 (38 Fe) MG tablet    fluticasone (FLONASE) 50 MCG/ACT nasal spray    hydroxychloroquine (PLAQUENIL) 200 MG tablet    insulin glargine (LANTUS PEN) 100 UNIT/ML pen    insulin lispro (HUMALOG KWIKPEN) 100 UNIT/ML (1 unit dial) KWIKPEN    ketoconazole (NIZORAL) 2 % external cream    levocetirizine (XYZAL) 5 MG tablet    lidocaine (LIDODERM) 5 % patch    loratadine (CLARITIN) 10 MG tablet    methocarbamol (ROBAXIN) 750 MG tablet    metoprolol succinate ER (TOPROL XL) 50 MG 24 hr tablet    montelukast (SINGULAIR) 10 MG tablet    OZEMPIC, 1 MG/DOSE, 4 MG/3ML pen    pantoprazole (PROTONIX) 40 MG EC tablet    polyethylene glycol (MIRALAX) 17 g packet    potassium chloride ER (KLOR-CON) 20 MEQ CR tablet    predniSONE (DELTASONE) 10 MG tablet    predniSONE (DELTASONE) 5 MG tablet    torsemide (DEMADEX) 20 MG tablet    traZODone (DESYREL) 50 MG tablet     No current facility-administered medications for this visit.     Allergies   Allergen Reactions    Amoxicillin-Pot Clavulanate Nausea and Vomiting    Amoxicillin-Pot Clavulanate     Codeine Nausea and Vomiting     PN: LW Reaction: HIVES    Penicillins Nausea and Vomiting     PN: LW Reaction: GI Upset    Phenobarbital Itching    Seasonal Allergies      Past Medical History:   Diagnosis Date     Alcohol abuse, in remission     Allergic rhinitis, cause unspecified     allegra helps when she takes it    Antiplatelet or antithrombotic long-term use     Atrial fibrillation (H)     in hosp in 11/11 after surgery w/ fluid overload    Cardiomegaly     LVH on stress echo- cardiac w/u [facility] ER- neg CT scan for PE, neg stress echo in 8/06    Chest pain, unspecified     Congestive heart failure (H)     Depressive disorder     Diabetes (H)     Disorder of bone and cartilage, unspecified     osteopenia (had been on prempro), improved on 6/06 dexa, stable dexa 11/10    Diverticulosis of colon (without mention of hemorrhage)     last episode yrs ago    Essential hypertension, benign     Follicular bronchiolitis (H)     associated with Sjogrens, dx by chest CT showing mosaic attenuation and air trapping    Gastro-oesophageal reflux disease     ILD (interstitial lung disease) (H)     associated with Sjogrens, also has mildly elevated IgG4, first noted on chest CT 2015 (mild changes) and also has small airways disease; ILD improved on follow up chest CT 2018.    Insomnia, unspecified     weaned off clonazepam    Irregular heart beat     Lumbago 07/2009    MRI with DJD, now seeing Dr. Cain for sciatic sx's    Major depressive disorder, recurrent episode, moderate (H)     Obstructive sleep apnea     uses cpap    Osteoarthrosis, unspecified whether generalized or localized, unspecified site     Sjogren's syndrome (H24)     + RG and SSA and lip bx    Sleep apnea     uses a cpap machine    Tobacco use disorder     chantix in 9/07, started again in 6/08, working       Past Surgical History:   Procedure Laterality Date    APPENDECTOMY      ARTHROPLASTY HIP Right 5/31/2023    Procedure: Right total hip arthroplasty;  Surgeon: Thomas Shannon MD;  Location:  OR    BACK SURGERY  1962    BACK SURGERY  1962    BIOPSY BREAST  09/27/2002    Biopsy Left Breast    BIOPSY BREAST Left 09/27/2002    BREAST SURGERY      CARDIAC  SURGERY      CARDIAC SURGERY      CHOLECYSTECTOMY  1990's?    CHOLECYSTECTOMY      COLECTOMY LEFT  11/07/2011    Procedure:COLECTOMY LEFT; Laparoscopic mobilization of splenic flexture, sigmoid colectomy, coloprotoscopy, loop illeostomy; Surgeon:CK SIDDIQI; Location:[facility] OR    COLECTOMY LEFT  11/07/2011    COLONOSCOPY  1990,s    COLONOSCOPY N/A 5/3/2022    Procedure: COLONOSCOPY;  Surgeon: Guru Winsome Dias MD;  Location:  GI    CV LEFT ATRIAL APPENDAGE CLOSURE N/A 9/26/2022    Procedure: Left Atrial Appendage Closure;  Surgeon: Uzair Crews MD;  Location:  HEART CARDIAC CATH LAB    ENT SURGERY      EYE SURGERY  2012    FLEXIBLE SIGMOIDOSCOPY  11/03/2011    HYSTERECTOMY TOTAL ABDOMINAL, BILATERAL SALPINGO-OOPHORECTOMY, COMBINED  11/07/2011    Procedure:COMBINED HYSTERECTOMY TOTAL ABDOMINAL, BILATERAL SALPINGO-OOPHORECTOMY; total abdominal hysterectomy, bilateral salpingo-oophorectomy; Surgeon:ALETA MANUEL; Location:[facility] OR    HYSTERECTOMY TOTAL ABDOMINAL, BILATERAL SALPINGO-OOPHORECTOMY, COMBINED  11/07/2011    ILEOSTOMY  02/01/2012    takedown loop ileostomy     INSERT STENT URETER  11/07/2011    Procedure:INSERT STENT URETER; Placement of Bilateral Ureteral Stents ; Surgeon:PRANEETH BRAYNT; Location:[facility] OR    SIGMOIDECTOMY  02/01/2012    Dr. Siddiqi, Sigmoidectomy for diverticular abscess, iliostomy afterwards until repair    SIGMOIDOSCOPY FLEXIBLE  11/03/2011    Procedure:SIGMOIDOSCOPY FLEXIBLE; Flexible Sigmoidoscopy; Surgeon:CK SIDDIQI; Location:[facility] OR    TAKEDOWN ILEOSTOMY  02/01/2012    Procedure:TAKEDOWN ILEOSTOMY; Takedown Loop Ileostomy ; Surgeon:CK SIDDIQI; Location:[facility] OR    URETERAL STENT PLACEMENT  11/07/2011    ZZC APPENDECTOMY  1970's?    ZZC NONSPECIFIC PROCEDURE  11/2005    exploratory abd lap, adhesions, resolved RLQ pain, diverticulitis episodes       Social History     Socioeconomic History    Marital status: Single     Spouse name: Not on file     Number of children: 0    Years of education: Ed Spec De    Highest education level: Not on file   Occupational History    Occupation: Professor     Employer: [employer]     Comment: [employer] Education   Tobacco Use    Smoking status: Former     Packs/day: .5     Types: Cigarettes     Start date:      Quit date: 2011     Years since quittin.3    Smokeless tobacco: Never    Tobacco comments:     / ppd   Vaping Use    Vaping Use: Never used   Substance and Sexual Activity    Alcohol use: No     Alcohol/week: 0.0 standard drinks of alcohol     Comment: In recovery beginning     Drug use: No    Sexual activity: Never   Other Topics Concern    Parent/sibling w/ CABG, MI or angioplasty before 65F 55M? Yes   Social History Narrative                 Social Determinants of Health     Financial Resource Strain: Low Risk  (10/6/2023)    Financial Resource Strain     Within the past 12 months, have you or your family members you live with been unable to get utilities (heat, electricity) when it was really needed?: No   Food Insecurity: Low Risk  (10/6/2023)    Food Insecurity     Within the past 12 months, did you worry that your food would run out before you got money to buy more?: No     Within the past 12 months, did the food you bought just not last and you didn t have money to get more?: No   Transportation Needs: Low Risk  (10/6/2023)    Transportation Needs     Within the past 12 months, has lack of transportation kept you from medical appointments, getting your medicines, non-medical meetings or appointments, work, or from getting things that you need?: No   Physical Activity: Not on file   Stress: Not on file   Social Connections: Not on file   Interpersonal Safety: Not on file   Housing Stability: Low Risk  (10/6/2023)    Housing Stability     Do you have housing? : Yes     Are you worried about losing your housing?: No       Family History   Problem Relation  Age of Onset    C.A.D. Mother 63        MI- first at age 63    Heart Disease Mother     Hypertension Mother     Cerebrovascular Disease Mother     Hyperlipidemia Mother     Coronary Artery Disease Mother         MI-first at age 63    Other - See Comments Mother         cerebrovascular disease     Alcohol/Drug Father     Alzheimer Disease Father     Dementia Father     Hypertension Father     Hyperlipidemia Father     Substance Abuse Father     Diabetes Sister     Hypertension Sister     Hyperlipidemia Sister     Substance Abuse Sister     Asthma Sister     C.A.D. Sister 52        Minor MI- age 50's    Heart Disease Sister     Hypertension Sister     Hypertension Sister     Cancer - colorectal Sister 48        Late 40's early 50's    Gastrointestinal Disease Sister         Diverticulitis    Lipids Sister     Lipids Sister     Diabetes Sister     Heart Disease Sister         CHF    Cancer Sister         lung, smoker    Substance Abuse Sister     Asthma Sister     Cancer Sister     Coronary Artery Disease Sister         minor MI-age 50's    Heart Disease Sister     Hypertension Sister     Hypertension Sister     Colon Cancer Sister     Diverticulitis Sister     Lung Cancer Sister     Heart Disease Sister         CHF    Diabetes Sister     Asthma Sister     Hypertension Brother     Prostate Cancer Brother 74        Dx'd age 74    Gastrointestinal Disease Brother         Diverticulitis    Parkinsonism Brother     Substance Abuse Brother     Prostate Cancer Brother     Hyperlipidemia Brother     Hypertension Brother     Prostate Cancer Brother     Diverticulitis Brother     Parkinsonism Brother     Breast Cancer Daughter     Breast Cancer Daughter     Diabetes Other     Hypertension Other     Anesthesia Reaction No family hx of     Deep Vein Thrombosis (DVT) No family hx of          Vitals: /78   Pulse 55   LMP  (LMP Unknown)   SpO2 95%     Exam:   GENERAL APPEARANCE: Well developed, well nourished, alert, and in  no apparent distress.  RESP: good air flow throughout.  No crackles. No rhonchi. No wheezes.  No Inspiratory squeaks.  CV: Normal S1, S2, regular rhythm, normal rate. No murmur.  No LE edema.   MS: extremities normal. No clubbing. No cyanosis.  SKIN: no rash on limited exam.  NEURO: Mentation intact, speech normal.  Sitting in wheelchair.  PSYCH: mentation appears normal. and affect normal/bright.    Results:  Recent Results (from the past 168 hour(s))   General PFT Lab (Please always keep checked)    Collection Time: 11/16/23 11:43 AM   Result Value Ref Range    FVC-Pred 2.37 L    FVC-Pre 2.02 L    FVC-%Pred-Pre 85 %    FEV1-Pre 1.56 L    FEV1-%Pred-Pre 86 %    FEV1FVC-Pred 77 %    FEV1FVC-Pre 77 %    FEFMax-Pred 4.68 L/sec    FEFMax-Pre 4.93 L/sec    FEFMax-%Pred-Pre 105 %    FEF2575-Pred 1.43 L/sec    FEF2575-Pre 1.31 L/sec    YTM1811-%Pred-Pre 91 %    ExpTime-Pre 8.25 sec    FIFMax-Pre 2.49 L/sec    VC-Pred 3.07 L    VC-Pre 1.92 L    VC-%Pred-Pre 62 %    IC-Pred 1.74 L    IC-Pre 1.69 L    IC-%Pred-Pre 96 %    ERV-Pred 0.87 L    ERV-Pre 0.23 L    ERV-%Pred-Pre 26 %    FEV1FEV6-Pred 77 %    FEV1FEV6-Pre 77 %    FRCPleth-Pred 2.81 L    FRCPleth-Pre 2.39 L    FRCPleth-%Pred-Pre 85 %    RVPleth-Pred 2.35 L    RVPleth-Pre 2.17 L    RVPleth-%Pred-Pre 92 %    TLCPleth-Pred 5.19 L    TLCPleth-Pre 4.09 L    TLCPleth-%Pred-Pre 78 %    DLCOunc-Pred 18.79 ml/min/mmHg    DLCOunc-Pre 17.42 ml/min/mmHg    DLCOunc-%Pred-Pre 92 %    VA-Pre 3.23 L    VA-%Pred-Pre 69 %    FEV1SVC-Pred 59 %    FEV1SVC-Pre 81 %       I reviewed pulmonary function test that was performed today.  This shows mild restriction with normal diffusing capacity.  Lung function is stable.  Overall, lung function has improved significantly compared to 2018.    I reviewed results with the patient.      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    1.  Follicular  bronchiolitis associated with Sjogren's.  PFT is stable and overall improved compared to 2018.  The improvement correlates with treatment of the follicular bronchiolitis.  Her pulmonary symptoms are stable, and we will continue prednisone 5 mg daily, daily azithromycin, montelukast and Breo Ellipta inhaler.  These are anti-inflammatories for the follicular bronchiolitis.  The prednisone dose is managed by Dr. Webster in rheumatology.  Her main complaint is a feeling of lightheadedness and that her brain is not working.  I recommended that she discuss this with her primary care provider.  It is unclear to me if she is referring to memory loss or the fact that it takes her longer to think of things or to initiate an action.  She will return in 6 months with full PFT and a 6-minute walk test.  2.  Healthcare maintenance.  Her sister robert who is with her today in clinic is going to schedule an appointment for RSV vaccine and the COVID-19 updated booster.  I spent 31 minutes reviewing chart, talking with and examining patient, reviewing test results, formulating plan and documentation on the day of the encounter (excluding PFT review).

## 2023-11-17 LAB
DLCOUNC-%PRED-PRE: 92 %
DLCOUNC-PRE: 17.42 ML/MIN/MMHG
DLCOUNC-PRED: 18.79 ML/MIN/MMHG
ERV-%PRED-PRE: 26 %
ERV-PRE: 0.23 L
ERV-PRED: 0.87 L
EXPTIME-PRE: 8.25 SEC
FEF2575-%PRED-PRE: 91 %
FEF2575-PRE: 1.31 L/SEC
FEF2575-PRED: 1.43 L/SEC
FEFMAX-%PRED-PRE: 105 %
FEFMAX-PRE: 4.93 L/SEC
FEFMAX-PRED: 4.68 L/SEC
FEV1-%PRED-PRE: 86 %
FEV1-PRE: 1.56 L
FEV1FEV6-PRE: 77 %
FEV1FEV6-PRED: 77 %
FEV1FVC-PRE: 77 %
FEV1FVC-PRED: 77 %
FEV1SVC-PRE: 81 %
FEV1SVC-PRED: 59 %
FIFMAX-PRE: 2.49 L/SEC
FRCPLETH-%PRED-PRE: 85 %
FRCPLETH-PRE: 2.39 L
FRCPLETH-PRED: 2.81 L
FVC-%PRED-PRE: 85 %
FVC-PRE: 2.02 L
FVC-PRED: 2.37 L
IC-%PRED-PRE: 96 %
IC-PRE: 1.69 L
IC-PRED: 1.74 L
RVPLETH-%PRED-PRE: 92 %
RVPLETH-PRE: 2.17 L
RVPLETH-PRED: 2.35 L
TLCPLETH-%PRED-PRE: 78 %
TLCPLETH-PRE: 4.09 L
TLCPLETH-PRED: 5.19 L
VA-%PRED-PRE: 69 %
VA-PRE: 3.23 L
VC-%PRED-PRE: 62 %
VC-PRE: 1.92 L
VC-PRED: 3.07 L

## 2023-12-01 ENCOUNTER — TELEPHONE (OUTPATIENT)
Dept: RHEUMATOLOGY | Facility: CLINIC | Age: 83
End: 2023-12-01
Payer: MEDICARE

## 2023-12-01 NOTE — TELEPHONE ENCOUNTER
PA Initiation    Medication: KINERET 100 [facility]0.67ML [facility]  Insurance Company: Medicare Blue - Phone [phone] Fax [phone]  Pharmacy Filling the Rx: [facility] - [place], [place] - [facility] [address]  Filling Pharmacy Phone:    Filling Pharmacy Fax:    Start Date: 12/1/2023    [id number]

## 2023-12-04 ENCOUNTER — TRANSFERRED RECORDS (OUTPATIENT)
Dept: HEALTH INFORMATION MANAGEMENT | Facility: CLINIC | Age: 83
End: 2023-12-04
Payer: MEDICARE

## 2023-12-04 DIAGNOSIS — G47.00 INSOMNIA, UNSPECIFIED TYPE: Primary | ICD-10-CM

## 2023-12-04 RX ORDER — TRAZODONE HYDROCHLORIDE 50 MG/1
150 TABLET, FILM COATED ORAL AT BEDTIME
Qty: 90 TABLET | Refills: 1 | Status: SHIPPED | OUTPATIENT
Start: 2023-12-04 | End: 2024-03-04

## 2023-12-04 NOTE — TELEPHONE ENCOUNTER
JS ([facility]),      Please address due to CW's absence    Patient called.  Asking if medication can be refilled today.    Thank you,  Sophia Hemphill RN

## 2023-12-04 NOTE — TELEPHONE ENCOUNTER
"CW,       Patient called.  States she requested Trazodone refill.   Pharmacy states we haven't responded to their request.  No request received       Routing refill request to provider for review/approval because:  Medication is reported/historical  What Dx do you want to associate with med  Last OV 10/6/23    Patient states she takes 150 mg (3 tablets) as needed at bedtime.  \"I do not take 3 every night\"    Thank you,  Sophia Hemphill RN      Triage: no need to call patient back unless needed      "

## 2023-12-05 ENCOUNTER — TELEPHONE (OUTPATIENT)
Dept: PALLIATIVE MEDICINE | Facility: CLINIC | Age: 83
End: 2023-12-05
Payer: MEDICARE

## 2023-12-05 NOTE — TELEPHONE ENCOUNTER
Last OV: 3/20/23    Next OV: n/a    [place] plan: PCP    Ilene SHERIDAN RN Care Coordinator  [facility]

## 2023-12-06 DIAGNOSIS — Z79.4 TYPE 2 DIABETES MELLITUS WITH HYPERGLYCEMIA, WITH LONG-TERM CURRENT USE OF INSULIN (H): ICD-10-CM

## 2023-12-06 DIAGNOSIS — E11.65 TYPE 2 DIABETES MELLITUS WITH HYPERGLYCEMIA, WITH LONG-TERM CURRENT USE OF INSULIN (H): ICD-10-CM

## 2023-12-06 NOTE — TELEPHONE ENCOUNTER
Prior Authorization Approval    Medication: KINERET 100 MG/0.67ML SC SOSY  Authorization Effective Date: 12/6/2023  Authorization Expiration Date: 12/5/2024  Approved Dose/Quantity: daily  Reference #: [id number]   Insurance Company: Medicare Blue - Phone [phone] Fax [phone]  Expected CoPay: $    CoPay Card Available: No    Financial Assistance Needed: no  Which Pharmacy is filling the prescription: [facility] - [place] [address]  Pharmacy Notified: yes  Patient Notified: yes

## 2023-12-07 ENCOUNTER — VIRTUAL VISIT (OUTPATIENT)
Dept: PHARMACY | Facility: CLINIC | Age: 83
End: 2023-12-07
Payer: COMMERCIAL

## 2023-12-07 DIAGNOSIS — E11.65 TYPE 2 DIABETES MELLITUS WITH HYPERGLYCEMIA, WITH LONG-TERM CURRENT USE OF INSULIN (H): Primary | ICD-10-CM

## 2023-12-07 DIAGNOSIS — Z79.4 TYPE 2 DIABETES MELLITUS WITH HYPERGLYCEMIA, WITH LONG-TERM CURRENT USE OF INSULIN (H): Primary | ICD-10-CM

## 2023-12-07 DIAGNOSIS — M10.9 GOUT, UNSPECIFIED CAUSE, UNSPECIFIED CHRONICITY, UNSPECIFIED SITE: ICD-10-CM

## 2023-12-07 PROCEDURE — 99207 PR NO CHARGE LOS: CPT | Performed by: PHARMACIST

## 2023-12-07 NOTE — PROGRESS NOTES
"Medication Therapy Management (MTM) Encounter    ASSESSMENT:                            Medication Adherence/Access: No issues identified    Diabetes   A1c is not meeting goal, SMBG is not meeting goals (declines CGM again today).   Would benefit from an increase in Lantus to decrease fasting blood sugars and would benefit from using Humalog with each meal. Increasing Ozempic may help with constant feelings of hunger.     Gout:   Stable    PLAN:                            Increase Lantus to 25 units  Finish up Ozempic 1mg weekly and then increase the dose to Ozempic 2mg weekly - this should help a little bit with your blood sugars, but mostly for your appetite.     Follow-up:  at 10:30am.     SUBJECTIVE/OBJECTIVE:                          Betty Tee is a 83 year old female called for a follow-up visit from 3/30/23.       Reason for visit: follow-up med review. \"I got my diabetes all out of whack\"  Started a supplement for blood sugars and is wondering if this is OK - Glucoswitch from [employer].     Allergies/ADRs: Reviewed in chart  Past Medical History: Reviewed in chart  Tobacco: She reports that she quit smoking about 12 years ago. Her smoking use included cigarettes. She started smoking about 53 years ago. She smoked an average of .5 packs per day. She has never used smokeless tobacco.  Alcohol: not currently using    Medication Adherence/Access: Missing some doses of Humalog, details below    Diabetes   Ozempic 1mg weekly  Lantus 20 units  Humalog per sliding scale below - does this before meals when she is home, almost always misses noon dose.   If BG <150, no insulin given   If -200 take 2 units   If -250 take 4 units   If -300 take 6 units   If -350 take 10 units   If BG >350 take 14 units - Subcutaneous   Aspirin 81mg daily  Patient is not experiencing side effects.  Blood sugar monitorin-4 time(s) daily; Ranges: 140-160 in the morning, at night 200-260  Current diabetes " symptoms: polyphagia     Eye exam is up to date  Foot exam is up to date  Urine Albumin:   Lab Results   Component Value Date    UMALCR 91.24 (H) 09/07/2023      Lab Results   Component Value Date    A1C 9.6 (H) 10/04/2023     Gout:   Allopurinol 100mg daily  Kineret 100mg daily when having a gout flare  Prednisone 5mg daily, taper on hand for flare  Has had no additional gout issues.   ----------------  I spent 20 minutes with this patient today. All changes were made via collaborative practice agreement with Ilene Tristan MD. A copy of the visit note was provided to the patient's provider(s).    A summary of these recommendations was sent via [employer].    Sabrina Meek PharmD, JAMES, BCACP  MTM Pharmacist, [facility]     Telemedicine Visit Details  Type of service:  Telephone visit  Start Time:  12:34 PM  End Time:  12:54 PM     Medication Therapy Recommendations  Type 2 diabetes mellitus with hyperglycemia, with long-term current use of insulin (H)    Current Medication: Semaglutide, 2 MG/DOSE, (OZEMPIC) 8 MG/3ML pen   Rationale: Dose too low - Dosage too low - Effectiveness   Recommendation: Increase Dose   Status: Accepted per CPA          Current Medication: insulin glargine (LANTUS PEN) 100 UNIT/ML pen   Rationale: Dose too low - Dosage too low - Effectiveness   Recommendation: Increase Dose   Status: Accepted per CPA

## 2023-12-14 ENCOUNTER — VIRTUAL VISIT (OUTPATIENT)
Dept: PHARMACY | Facility: CLINIC | Age: 83
End: 2023-12-14
Payer: COMMERCIAL

## 2023-12-14 DIAGNOSIS — E11.65 TYPE 2 DIABETES MELLITUS WITH HYPERGLYCEMIA, WITH LONG-TERM CURRENT USE OF INSULIN (H): Primary | ICD-10-CM

## 2023-12-14 DIAGNOSIS — Z79.4 TYPE 2 DIABETES MELLITUS WITH HYPERGLYCEMIA, WITH LONG-TERM CURRENT USE OF INSULIN (H): Primary | ICD-10-CM

## 2023-12-14 PROCEDURE — 99207 PR NO CHARGE LOS: CPT | Performed by: PHARMACIST

## 2023-12-14 NOTE — PATIENT INSTRUCTIONS
"Recommendations from today's MTM visit:                                                      Increase Lantus to 25 units  Finish up Ozempic 1mg weekly and then increase the dose to Ozempic 2mg weekly - this should help a little bit with your blood sugars, but mostly for your appetite.     Follow-up: 12/14 at 10:30am.     It was great speaking with you today.  I value your experience and would be very thankful for your time in providing feedback in our clinic survey. In the next few days, you may receive an email or text message from [employer] with a link to a survey related to your  clinical pharmacist.\"     To schedule another MTM appointment, please call the clinic directly or you may call the MTM scheduling line at [phone] or toll-free at [phone].     My Clinical Pharmacist's contact information:                                                      Please feel free to contact me with any questions or concerns you have.      Sabrina Meek, Pharm.D., M.B.A., [facility]  MTM Pharmacist, [facility]  Pager: [phone]  Email: [email or web address]      "

## 2023-12-14 NOTE — PATIENT INSTRUCTIONS
"Recommendations from today's MTM visit:                                                      Increase lantus to 30 units daily  Take Humalog prior to each meal (follow same sliding scale as you have been).     Follow-up: 1 week, sooner if needed.     It was great speaking with you today.  I value your experience and would be very thankful for your time in providing feedback in our clinic survey. In the next few days, you may receive an email or text message from [facility] [employer] with a link to a survey related to your  clinical pharmacist.\"     To schedule another MTM appointment, please call the clinic directly or you may call the MTM scheduling line at [phone] or toll-free at [phone].     My Clinical Pharmacist's contact information:                                                      Please feel free to contact me with any questions or concerns you have.      Sabrina Meek, Pharm.D., M.B.A., [facility]  MTM Pharmacist, [facility]  Pager: [phone]  Email: [email or web address]      "

## 2023-12-14 NOTE — PROGRESS NOTES
"Medication Therapy Management (MTM) Encounter    ASSESSMENT:                            Medication Adherence/Access: Would benefit from using Humalog prior to each meal.     Diabetes   SMBG not meeting goal. As noted, needs more frequent administration of Humalog. Would also benefit from increasing Lantus to reduce fasting glucose readings. Could consider retrial of Jardiance (in the past, her eGFR decreased from 50-60 to 36 after 2 weeks of treatment, continued to decline vs. Rebounding).     PLAN:                            Increase lantus to 30 units daily  Take Humalog prior to each meal (follow same sliding scale as you have been).     Follow-up: 1 week, sooner if needed.     SUBJECTIVE/OBJECTIVE:                          Betty Tee is a 83 year old female called for a follow-up visit from 23.       Reason for visit: follow-up on insulin changes at last visit.    Allergies/ADRs: Reviewed in chart  Past Medical History: Reviewed in chart  Tobacco: She reports that she quit smoking about 12 years ago. Her smoking use included cigarettes. She started smoking about 53 years ago. She smoked an average of .5 packs per day. She has never used smokeless tobacco.  Alcohol: not currently using    Medication Adherence/Access: See below    Diabetes     Ozempic 1mg weekly (will transition to 2mg weekly once 1mg supply runs out)  Lantus 25 units  Humalog per sliding scale below - does this before meals when she is home, almost always misses noon dose. If BG is \"close\" to 150 she doesn't take Humalog.   If BG <150, no insulin given   If -200 take 2 units   If -250 take 4 units   If -300 take 6 units   If -350 take 10 units   If BG >350 take 14 units - Subcutaneous   Aspirin 81mg daily  Patient is not experiencing side effects.  Blood sugar monitorin-4 time(s) daily; Ranges: (patient reported) Fasting- 150's (similar to last week despite increasing Lantus), prior to dinner 190s-200's. " Difficult to get specifics on the phone today (going by memory, reader not available).   Current diabetes symptoms: none       Eye exam is up to date  Foot exam is up to date  Urine Albumin:   Lab Results   Component Value Date    UMALCR 91.24 (H) 09/07/2023      Lab Results   Component Value Date    A1C 9.6 (H) 10/04/2023     Today's Vitals: LMP  (LMP Unknown)   ----------------      I spent 10 minutes with this patient today. All changes were made via collaborative practice agreement with Ilene Tristan MD. A copy of the visit note was provided to the patient's provider(s).    A summary of these recommendations was sent via [employer].    Sabrina Meek, MarcosD, JAMES, BCACP  MTM Pharmacist, [facility]     Telemedicine Visit Details  Type of service:  Telephone visit  Start Time:  10:44 AM  End Time:  10:54 AM     Medication Therapy Recommendations  Type 2 diabetes mellitus with hyperglycemia, with long-term current use of insulin (H)    Current Medication: insulin glargine (LANTUS PEN) 100 UNIT/ML pen   Rationale: Dose too low - Dosage too low - Effectiveness   Recommendation: Increase Dose   Status: Accepted per CPA          Current Medication: insulin lispro (HUMALOG KWIKPEN) 100 UNIT/ML (1 unit dial) KWIKPEN   Rationale: Patient prefers not to take - Adherence - Adherence   Recommendation: Provide Education   Status: Patient Agreed - Adherence/Education

## 2023-12-20 DIAGNOSIS — J84.9 ILD (INTERSTITIAL LUNG DISEASE) (H): Primary | ICD-10-CM

## 2023-12-21 DIAGNOSIS — J44.89 FOLLICULAR BRONCHIOLITIS (H): ICD-10-CM

## 2023-12-22 RX ORDER — AZITHROMYCIN 250 MG/1
TABLET, FILM COATED ORAL
Qty: 30 TABLET | Refills: 5 | Status: SHIPPED | OUTPATIENT
Start: 2023-12-22 | End: 2024-06-18

## 2024-01-03 DIAGNOSIS — J84.9 ILD (INTERSTITIAL LUNG DISEASE) (H): ICD-10-CM

## 2024-01-03 DIAGNOSIS — M35.02 SJOGREN'S SYNDROME WITH LUNG INVOLVEMENT (H): ICD-10-CM

## 2024-01-03 DIAGNOSIS — E11.9 TYPE 2 DIABETES MELLITUS WITHOUT COMPLICATION, WITHOUT LONG-TERM CURRENT USE OF INSULIN (H): ICD-10-CM

## 2024-01-03 RX ORDER — MONTELUKAST SODIUM 10 MG/1
TABLET ORAL
Qty: 90 TABLET | Refills: 0 | Status: SHIPPED | OUTPATIENT
Start: 2024-01-03 | End: 2024-03-18

## 2024-01-03 RX ORDER — BLOOD SUGAR DIAGNOSTIC
STRIP MISCELLANEOUS
Qty: 300 STRIP | Refills: 0 | Status: SHIPPED | OUTPATIENT
Start: 2024-01-03 | End: 2024-04-01

## 2024-01-06 ENCOUNTER — HEALTH MAINTENANCE LETTER (OUTPATIENT)
Age: 84
End: 2024-01-06

## 2024-01-12 ENCOUNTER — PRE VISIT (OUTPATIENT)
Dept: UROLOGY | Facility: CLINIC | Age: 84
End: 2024-01-12

## 2024-01-12 NOTE — TELEPHONE ENCOUNTER
Reason for visit: follow up      Dx/Hx/Sx: urge incontinence of urine, urinary urgency,   Records/imaging/labs/orders: in epic    At Rooming: standard

## 2024-01-16 ENCOUNTER — LAB (OUTPATIENT)
Dept: LAB | Facility: CLINIC | Age: 84
End: 2024-01-16
Payer: COMMERCIAL

## 2024-01-16 DIAGNOSIS — M35.02 SJOGREN'S SYNDROME WITH LUNG INVOLVEMENT (H): ICD-10-CM

## 2024-01-16 DIAGNOSIS — D50.9 IRON DEFICIENCY ANEMIA, UNSPECIFIED IRON DEFICIENCY ANEMIA TYPE: ICD-10-CM

## 2024-01-16 DIAGNOSIS — M10.9 GOUT, UNSPECIFIED CAUSE, UNSPECIFIED CHRONICITY, UNSPECIFIED SITE: ICD-10-CM

## 2024-01-16 DIAGNOSIS — Z79.4 TYPE 2 DIABETES MELLITUS WITH HYPERGLYCEMIA, WITH LONG-TERM CURRENT USE OF INSULIN (H): ICD-10-CM

## 2024-01-16 DIAGNOSIS — M19.90 INFLAMMATORY ARTHRITIS: ICD-10-CM

## 2024-01-16 DIAGNOSIS — M06.00 SERONEGATIVE RHEUMATOID ARTHRITIS (H): ICD-10-CM

## 2024-01-16 DIAGNOSIS — E11.65 TYPE 2 DIABETES MELLITUS WITH HYPERGLYCEMIA, WITH LONG-TERM CURRENT USE OF INSULIN (H): ICD-10-CM

## 2024-01-16 LAB
ALBUMIN SERPL BCG-MCNC: 4.2 G/DL (ref 3.5–5.2)
ALT SERPL W P-5'-P-CCNC: 10 U/L (ref 0–50)
AST SERPL W P-5'-P-CCNC: 18 U/L (ref 0–45)
BASOPHILS # BLD AUTO: 0.1 10E3/UL (ref 0–0.2)
BASOPHILS NFR BLD AUTO: 1 %
CREAT SERPL-MCNC: 0.89 MG/DL (ref 0.51–0.95)
CRP SERPL-MCNC: 6.83 MG/L
EGFRCR SERPLBLD CKD-EPI 2021: 64 ML/MIN/1.73M2
EOSINOPHIL # BLD AUTO: 0.2 10E3/UL (ref 0–0.7)
EOSINOPHIL NFR BLD AUTO: 2 %
ERYTHROCYTE [DISTWIDTH] IN BLOOD BY AUTOMATED COUNT: 14 % (ref 10–15)
ERYTHROCYTE [SEDIMENTATION RATE] IN BLOOD BY WESTERGREN METHOD: 20 MM/HR (ref 0–30)
FERRITIN SERPL-MCNC: 103 NG/ML (ref 11–328)
HBA1C MFR BLD: 8.9 %
HCT VFR BLD AUTO: 42.1 % (ref 35–47)
HGB BLD-MCNC: 13.6 G/DL (ref 11.7–15.7)
IMM GRANULOCYTES # BLD: 0.1 10E3/UL
IMM GRANULOCYTES NFR BLD: 1 %
LYMPHOCYTES # BLD AUTO: 1.1 10E3/UL (ref 0.8–5.3)
LYMPHOCYTES NFR BLD AUTO: 12 %
MCH RBC QN AUTO: 29.5 PG (ref 26.5–33)
MCHC RBC AUTO-ENTMCNC: 32.3 G/DL (ref 31.5–36.5)
MCV RBC AUTO: 91 FL (ref 78–100)
MONOCYTES # BLD AUTO: 1.1 10E3/UL (ref 0–1.3)
MONOCYTES NFR BLD AUTO: 11 %
NEUTROPHILS # BLD AUTO: 7 10E3/UL (ref 1.6–8.3)
NEUTROPHILS NFR BLD AUTO: 73 %
NRBC # BLD AUTO: 0 10E3/UL
NRBC BLD AUTO-RTO: 0 /100
PLATELET # BLD AUTO: 210 10E3/UL (ref 150–450)
RBC # BLD AUTO: 4.61 10E6/UL (ref 3.8–5.2)
URATE SERPL-MCNC: 4 MG/DL (ref 2.4–5.7)
WBC # BLD AUTO: 9.5 10E3/UL (ref 4–11)

## 2024-01-16 PROCEDURE — 85652 RBC SED RATE AUTOMATED: CPT | Performed by: PATHOLOGY

## 2024-01-16 PROCEDURE — 36415 COLL VENOUS BLD VENIPUNCTURE: CPT | Performed by: PATHOLOGY

## 2024-01-16 PROCEDURE — 84165 PROTEIN E-PHORESIS SERUM: CPT | Mod: 26 | Performed by: STUDENT IN AN ORGANIZED HEALTH CARE EDUCATION/TRAINING PROGRAM

## 2024-01-16 PROCEDURE — 86160 COMPLEMENT ANTIGEN: CPT | Performed by: INTERNAL MEDICINE

## 2024-01-16 PROCEDURE — 83036 HEMOGLOBIN GLYCOSYLATED A1C: CPT | Performed by: FAMILY MEDICINE

## 2024-01-16 PROCEDURE — 82565 ASSAY OF CREATININE: CPT | Performed by: PATHOLOGY

## 2024-01-16 PROCEDURE — 84450 TRANSFERASE (AST) (SGOT): CPT | Performed by: PATHOLOGY

## 2024-01-16 PROCEDURE — 82607 VITAMIN B-12: CPT | Performed by: INTERNAL MEDICINE

## 2024-01-16 PROCEDURE — 85025 COMPLETE CBC W/AUTO DIFF WBC: CPT | Performed by: PATHOLOGY

## 2024-01-16 PROCEDURE — 84460 ALANINE AMINO (ALT) (SGPT): CPT | Performed by: PATHOLOGY

## 2024-01-16 PROCEDURE — 86140 C-REACTIVE PROTEIN: CPT | Performed by: PATHOLOGY

## 2024-01-16 PROCEDURE — 82040 ASSAY OF SERUM ALBUMIN: CPT | Performed by: PATHOLOGY

## 2024-01-16 PROCEDURE — 84165 PROTEIN E-PHORESIS SERUM: CPT | Mod: TC | Performed by: STUDENT IN AN ORGANIZED HEALTH CARE EDUCATION/TRAINING PROGRAM

## 2024-01-16 PROCEDURE — 82728 ASSAY OF FERRITIN: CPT | Performed by: PATHOLOGY

## 2024-01-16 PROCEDURE — 84155 ASSAY OF PROTEIN SERUM: CPT | Performed by: INTERNAL MEDICINE

## 2024-01-16 PROCEDURE — 99000 SPECIMEN HANDLING OFFICE-LAB: CPT | Performed by: PATHOLOGY

## 2024-01-16 PROCEDURE — 84550 ASSAY OF BLOOD/URIC ACID: CPT | Performed by: PATHOLOGY

## 2024-01-17 ENCOUNTER — OFFICE VISIT (OUTPATIENT)
Dept: UROLOGY | Facility: CLINIC | Age: 84
End: 2024-01-17
Payer: COMMERCIAL

## 2024-01-17 VITALS — HEART RATE: 72 BPM | DIASTOLIC BLOOD PRESSURE: 73 MMHG | SYSTOLIC BLOOD PRESSURE: 113 MMHG

## 2024-01-17 DIAGNOSIS — Z86.718 HISTORY OF DEEP VEIN THROMBOSIS (DVT) OF LOWER EXTREMITY: Primary | ICD-10-CM

## 2024-01-17 DIAGNOSIS — J30.1 SEASONAL ALLERGIC RHINITIS DUE TO POLLEN: ICD-10-CM

## 2024-01-17 DIAGNOSIS — N95.8 GENITOURINARY SYNDROME OF MENOPAUSE: ICD-10-CM

## 2024-01-17 DIAGNOSIS — N39.41 URGE INCONTINENCE: Primary | ICD-10-CM

## 2024-01-17 LAB
ALBUMIN SERPL ELPH-MCNC: 3.8 G/DL (ref 3.7–5.1)
ALPHA1 GLOB SERPL ELPH-MCNC: 0.3 G/DL (ref 0.2–0.4)
ALPHA2 GLOB SERPL ELPH-MCNC: 0.8 G/DL (ref 0.5–0.9)
B-GLOBULIN SERPL ELPH-MCNC: 1.1 G/DL (ref 0.6–1)
C3 SERPL-MCNC: 134 MG/DL (ref 81–157)
C4 SERPL-MCNC: 33 MG/DL (ref 13–39)
GAMMA GLOB SERPL ELPH-MCNC: 1.1 G/DL (ref 0.7–1.6)
M PROTEIN SERPL ELPH-MCNC: 0 G/DL
PROT PATTERN SERPL ELPH-IMP: ABNORMAL
TOTAL PROTEIN SERUM FOR ELP: 7 G/DL (ref 6.4–8.3)
VIT B12 SERPL-MCNC: 573 PG/ML (ref 232–1245)

## 2024-01-17 PROCEDURE — 99214 OFFICE O/P EST MOD 30 MIN: CPT | Performed by: UROLOGY

## 2024-01-17 RX ORDER — LORATADINE 10 MG/1
TABLET ORAL
Qty: 90 TABLET | Refills: 3 | Status: SHIPPED | OUTPATIENT
Start: 2024-01-17

## 2024-01-17 RX ORDER — MIRABEGRON 25 MG/1
25 TABLET, FILM COATED, EXTENDED RELEASE ORAL DAILY
Qty: 30 TABLET | Refills: 11 | Status: SHIPPED | OUTPATIENT
Start: 2024-01-17

## 2024-01-17 ASSESSMENT — PAIN SCALES - GENERAL: PAINLEVEL: NO PAIN (0)

## 2024-01-17 NOTE — PATIENT INSTRUCTIONS
Please follow up for a virtual visit with Dr. Nassar in two months.    -start Myrbetriq 25 mg (pt. Will monitor her bp)  -continue estrogen cream  -follow up in 2 months, virtually

## 2024-01-17 NOTE — NURSING NOTE
Chief Complaint   Patient presents with    Consult     Stress and urge incontinence        Blood pressure 113/73, pulse 72, not currently breastfeeding. There is no height or weight on file to calculate BMI.    Patient Active Problem List   Diagnosis    Temporomandibular joint disorder    Diverticulitis of colon    Osteopenia of multiple sites    Alcohol abuse, in remission    Restless legs syndrome (RLS)    Major depressive disorder, recurrent episode, moderate (H)    Essential hypertension with goal blood pressure less than 140/90    Advanced directives, counseling/discussion    Colouterine fistula    Paroxysmal atrial fibrillation (H)    Left ventricular hypertrophy    Health Care Home    Insomnia    Breast fibroadenoma    History of deep vein thrombosis (DVT) of lower extremity    Fatty liver disease, nonalcoholic    Rib pain    Gastroesophageal reflux disease without esophagitis    Enlarged lymph node    Sjogren's syndrome with lung involvement (H)    ANGELICA (obstructive sleep apnea)    ILD (interstitial lung disease) (H)    Lower GI bleed    (HFpEF) heart failure with preserved ejection fraction (H)    Type 2 diabetes mellitus with hyperglycemia, with long-term current use of insulin (H)    Hyperlipidemia LDL goal <100    Inflammatory arthritis    Follicular bronchiolitis (H)    Stage 3a chronic kidney disease (H)    Urge incontinence of urine    Osteoarthritis of right hip    Seasonal allergic rhinitis due to pollen    Elevated parathyroid hormone    Persistent atrial fibrillation (H)    Vitamin D deficiency    GAVE (gastric antral vascular ectasia)    Iron deficiency anemia, unspecified iron deficiency anemia type    Gout, unspecified cause, unspecified chronicity, unspecified site    Gastroenteritis    Lactic acidosis    Recurrent cold sores    Seronegative rheumatoid arthritis (H)       Allergies   Allergen Reactions    Amoxicillin-Pot Clavulanate Nausea and Vomiting    Amoxicillin-Pot Clavulanate     Codeine  Nausea and Vomiting     PN: LW Reaction: HIVES    Penicillins Nausea and Vomiting     PN: LW Reaction: GI Upset    Phenobarbital Itching    Seasonal Allergies        Current Outpatient Medications   Medication Sig Dispense Refill    acyclovir (ZOVIRAX) 400 MG tablet Take 1 tablet (400 mg) by mouth every 8 hours For a couple days 15 tablet 2    acyclovir (ZOVIRAX) 5 % external ointment Apply topically as needed (6 times day PRN for outbreaks) As needed for outbreaks 15 g 3    albuterol (PROAIR HFA/PROVENTIL HFA/VENTOLIN HFA) 108 (90 Base) MCG/ACT inhaler Inhale 2 puffs into the lungs every 6 hours as needed for wheezing or shortness of breath      alendronate (FOSAMAX) 70 MG tablet Take 1 tablet (70 mg) by mouth every 7 days 12 tablet 1    allopurinol (ZYLOPRIM) 100 MG tablet Take 1 tablet (100 mg) by mouth daily 90 tablet 1    anakinra (KINERET) 100 MG/0.67ML SOSY injection Inject 0.67 mLs (100 mg) Subcutaneous daily 20.1 mL 3    aspirin (ASA) 81 MG EC tablet Take 1 tablet (81 mg) by mouth 2 times daily 60 tablet 0    atorvastatin (LIPITOR) 10 MG tablet TAKE 1/2 TABLET BY MOUTH EVERY DAY 45 tablet 3    azithromycin (ZITHROMAX) 250 MG tablet TAKE 1 TABLET BY MOUTH EVERY DAY 30 tablet 5    BD PEN NEEDLE JANE 2ND GEN 32G X 4 MM miscellaneous USE TO TEST 4 TIMES A  each 1    blood glucose (ACCU-CHEK SHANNON PLUS) test strip USE TO TEST BLOOD SUGARS 3 TIMES DAILY 300 strip 0    blood glucose (NO BRAND SPECIFIED) lancets standard Use to test blood sugar 3 times daily or as directed 100 Lancet 3    BREO ELLIPTA 100-25 MCG/ACT inhaler TAKE 1 PUFF BY MOUTH EVERY DAY 1 each 11    cevimeline (EVOXAC) 30 MG capsule Take 1 capsule (30 mg) by mouth 3 times daily as needed      Cholecalciferol (VITAMIN D3) 50 MCG (2000 UT) CAPS TAKE 100 MCG BY MOUTH DAILY (TAKE 2 TABLET (50 MCG) BY MOUTH DAILY - ORAL) 180 capsule 0    COMPOUNDED NON-CONTROLLED SUBSTANCE (CMPD RX) - PHARMACY TO MIX COMPOUNDED MEDICATION Estriol 1 mg/g Apply  small amount to finger and apply to inside vagina daily for 2 weeks then twice weekly Route: vaginally Dispense 30 grams 11 refills 30 g 11    cyanocobalamin (VITAMIN B-12) 1000 MCG tablet Take 1 tablet (1,000 mcg) by mouth three times a week      escitalopram (LEXAPRO) 20 MG tablet TAKE 1 TABLET BY MOUTH EVERY DAY 90 tablet 1    fluticasone (FLONASE) 50 MCG/ACT nasal spray SPRAY 1-2 SPRAYS INTO BOTH NOSTRILS DAILY AS NEEDED FOR ALLERGIES 16 mL 11    hydroxychloroquine (PLAQUENIL) 200 MG tablet Take 1 tablet (200 mg) by mouth daily Get annual eye exams for hydroxychloroquine (Plaquenil) monitoring and fax to [phone] 90 tablet 1    insulin glargine (LANTUS PEN) 100 UNIT/ML pen Inject 25 Units Subcutaneous at bedtime 15 mL 1    insulin lispro (HUMALOG KWIKPEN) 100 UNIT/ML (1 unit dial) KWIKPEN Inject Subcutaneous 3 times daily (before meals) Sliding scale insulin; tests BG three times day  If BG <150, no insulin given   If -200 take 2 units  If -250 take 4 units  If -300 take 6 units  If -350 take 10 units  If BG >350 take 14 units      ketoconazole (NIZORAL) 2 % external cream Apply topically 2 times daily as needed for itching 60 g 1    levocetirizine (XYZAL) 5 MG tablet Take 1 tablet (5 mg) by mouth every evening 90 tablet 1    lidocaine (LIDODERM) 5 % patch APPLY PATCH TO PAINFUL AREA FOR UP TO 12 H WITHIN A 24 H PERIOD. REMOVE AFTER 12 HOURS. (Patient taking differently: Apply patch to painful area for up to 12 h within a 24 h period.  Remove after 12 hours.) 30 patch 2    loratadine (CLARITIN) 10 MG tablet TAKE 1 TABLET BY MOUTH EVERY DAY 90 tablet 3    metoprolol succinate ER (TOPROL XL) 50 MG 24 hr tablet Take 1 tablet (50 mg) by mouth 2 times daily 180 tablet 3    montelukast (SINGULAIR) 10 MG tablet TAKE 1 TABLET BY MOUTH EVERYDAY AT BEDTIME 90 tablet 0    pantoprazole (PROTONIX) 40 MG EC tablet Take 1 tablet (40 mg) by mouth daily (replaces famotidine- should stop taking  famotidine) 90 tablet 3    potassium chloride ER (KLOR-CON) 20 MEQ CR tablet Take 2 tablets (40 mEq) by mouth every morning AND 1 tablet (20 mEq) every evening. 270 tablet 3    predniSONE (DELTASONE) 5 MG tablet Take 1 tablet (5 mg) by mouth daily 90 tablet 1    Semaglutide, 2 MG/DOSE, (OZEMPIC) 8 MG/3ML pen Inject 2 mg Subcutaneous every 7 days 9 mL 3    torsemide (DEMADEX) 20 MG tablet Take 2 tablets (40 mg) by mouth every morning AND 1 tablet (20 mg) every evening. 270 tablet 3    acetaminophen (TYLENOL) 500 MG tablet Take 2 tablets (1,000 mg) by mouth every 8 hours as needed (max 6 tablets/24 hours, 2 tablets/dose) (Patient not taking: Reported on 2024) 60 tablet 0    ferrous gluconate (FERGON) 324 (38 Fe) MG tablet Take 1 tablet (324 mg) by mouth three times a week (Patient not taking: Reported on 2024)      methocarbamol (ROBAXIN) 750 MG tablet Take 1 tablet (750 mg) by mouth 3 times daily as needed for muscle spasms (Patient not taking: Reported on 2024) 90 tablet 3    polyethylene glycol (MIRALAX) 17 g packet Take 17 g by mouth daily (Patient not taking: Reported on 2024) 10 packet 0    predniSONE (DELTASONE) 10 MG tablet For gout flares, take the prednisone as 40-30-20-10 mg a day, each course x 3 days then go back to 5 mg a day (Patient not taking: Reported on 2024) 30 tablet 3    traZODone (DESYREL) 50 MG tablet Take 3 tablets (150 mg) by mouth at bedtime (Patient not taking: Reported on 2024) 90 tablet 1       Social History     Tobacco Use    Smoking status: Former     Packs/day: .5     Types: Cigarettes     Start date:      Quit date: 2011     Years since quittin.4    Smokeless tobacco: Never    Tobacco comments:      ppd   Vaping Use    Vaping Use: Never used   Substance Use Topics    Alcohol use: No     Alcohol/week: 0.0 standard drinks of alcohol     Comment: In recovery beginning     Drug use: No       Willie [place]  2024  11:44 AM

## 2024-01-17 NOTE — LETTER
1/17/2024       RE: Betty Tee  [address]  [facility] [address]     Dear Colleague,    Thank you for referring your patient, Betty Tee, to the [facility] [place] at . Please see a copy of my visit note below.    Reason for Visit:  Follow up urinary urgency/incontinence    Clinical Data:  Ms. Tee is a 83 year old female with urinary urgency and incontinence. She was started on estrogen cream and has been using that.  She has not tried any medication because she was trying more conservative measures.  She feels thinks have not changed and finds also that when she gets up from a sitting position she starts to leak as well.    Assessment & Plan: Betty Tee is a 83 year old female with a history of urinary urgency and urge incontinence as well. We discussed the nature of her condition as well as various options for treatment. She is willing to try medication now therefore will start there.  We also discussed periurethral bulking but she would like to hold off on this for now.  -start Myrbetriq 25 mg (pt. Will monitor her bp)  -continue estrogen cream  -follow up in 2 months, virtually      Thank you for allowing me to participate in the care of  Ms. Betty Tee and I will keep you updated on her progress.    Vale Nassar MD

## 2024-01-17 NOTE — PROGRESS NOTES
Reason for Visit:  Follow up urinary urgency/incontinence    Clinical Data:  Ms. Tee is a 83 year old female with urinary urgency and incontinence. She was started on estrogen cream and has been using that.  She has not tried any medication because she was trying more conservative measures.  She feels thinks have not changed and finds also that when she gets up from a sitting position she starts to leak as well.    Assessment & Plan: Betty Tee is a 83 year old female with a history of urinary urgency and urge incontinence as well. We discussed the nature of her condition as well as various options for treatment. She is willing to try medication now therefore will start there.  We also discussed periurethral bulking but she would like to hold off on this for now.  -start Myrbetriq 25 mg (pt. Will monitor her bp)  -continue estrogen cream  -follow up in 2 months, virtually      Thank you for allowing me to participate in the care of  Ms. Betty Tee and I will keep you updated on her progress.    Vale Nassar MD

## 2024-01-19 ENCOUNTER — MYC MEDICAL ADVICE (OUTPATIENT)
Dept: RHEUMATOLOGY | Facility: CLINIC | Age: 84
End: 2024-01-19

## 2024-01-19 NOTE — CONFIDENTIAL NOTE
Call to [facility] and patient to offer an appt on 2/28. Appt confirmed and scheduled. All questions answered.    NORMA ConleyN, RN  RN Care Coordinator Rheumatology

## 2024-01-22 ENCOUNTER — TELEPHONE (OUTPATIENT)
Dept: UROLOGY | Facility: CLINIC | Age: 84
End: 2024-01-22
Payer: COMMERCIAL

## 2024-01-28 DIAGNOSIS — E55.9 VITAMIN D DEFICIENCY: ICD-10-CM

## 2024-01-29 ENCOUNTER — TELEPHONE (OUTPATIENT)
Dept: PULMONOLOGY | Facility: CLINIC | Age: 84
End: 2024-01-29

## 2024-01-29 RX ORDER — ACETAMINOPHEN 160 MG
100 TABLET,DISINTEGRATING ORAL DAILY
Qty: 180 CAPSULE | Refills: 0 | Status: SHIPPED | OUTPATIENT
Start: 2024-01-29 | End: 2024-04-26

## 2024-01-29 NOTE — TELEPHONE ENCOUNTER
[facility] Call Center    Phone Message    May a detailed message be left on voicemail: yes     Reason for Call: Other: Ashlyn from the [facility] in [facility] is calling to have updated PT orders. Please send updated orders for PT and update number of visits on order to fax: [phone].     Action Taken: Message routed to:  [facility] Pulm     Travel Screening: Not Applicable

## 2024-01-30 ENCOUNTER — OFFICE VISIT (OUTPATIENT)
Dept: FAMILY MEDICINE | Facility: CLINIC | Age: 84
End: 2024-01-30
Payer: COMMERCIAL

## 2024-01-30 VITALS
TEMPERATURE: 97.4 F | HEART RATE: 55 BPM | SYSTOLIC BLOOD PRESSURE: 133 MMHG | HEIGHT: 66 IN | DIASTOLIC BLOOD PRESSURE: 83 MMHG | RESPIRATION RATE: 18 BRPM | BODY MASS INDEX: 29.2 KG/M2 | WEIGHT: 181.7 LBS | OXYGEN SATURATION: 95 %

## 2024-01-30 DIAGNOSIS — I50.32 CHRONIC HEART FAILURE WITH PRESERVED EJECTION FRACTION (H): ICD-10-CM

## 2024-01-30 DIAGNOSIS — R53.81 PHYSICAL DECONDITIONING: ICD-10-CM

## 2024-01-30 DIAGNOSIS — I48.0 PAROXYSMAL ATRIAL FIBRILLATION (H): ICD-10-CM

## 2024-01-30 DIAGNOSIS — Z79.4 TYPE 2 DIABETES MELLITUS WITH HYPERGLYCEMIA, WITH LONG-TERM CURRENT USE OF INSULIN (H): Primary | ICD-10-CM

## 2024-01-30 DIAGNOSIS — Z29.11 NEED FOR VACCINATION AGAINST RESPIRATORY SYNCYTIAL VIRUS: ICD-10-CM

## 2024-01-30 DIAGNOSIS — N18.31 STAGE 3A CHRONIC KIDNEY DISEASE (H): ICD-10-CM

## 2024-01-30 DIAGNOSIS — M10.9 GOUT, UNSPECIFIED CAUSE, UNSPECIFIED CHRONICITY, UNSPECIFIED SITE: ICD-10-CM

## 2024-01-30 DIAGNOSIS — M06.00 SERONEGATIVE RHEUMATOID ARTHRITIS (H): ICD-10-CM

## 2024-01-30 DIAGNOSIS — E78.5 HYPERLIPIDEMIA LDL GOAL <100: ICD-10-CM

## 2024-01-30 DIAGNOSIS — E11.65 TYPE 2 DIABETES MELLITUS WITH HYPERGLYCEMIA, WITH LONG-TERM CURRENT USE OF INSULIN (H): Primary | ICD-10-CM

## 2024-01-30 DIAGNOSIS — F33.1 MAJOR DEPRESSIVE DISORDER, RECURRENT EPISODE, MODERATE (H): ICD-10-CM

## 2024-01-30 DIAGNOSIS — J44.89 FOLLICULAR BRONCHIOLITIS (H): ICD-10-CM

## 2024-01-30 DIAGNOSIS — I10 ESSENTIAL HYPERTENSION WITH GOAL BLOOD PRESSURE LESS THAN 140/90: ICD-10-CM

## 2024-01-30 DIAGNOSIS — K31.819 GAVE (GASTRIC ANTRAL VASCULAR ECTASIA): ICD-10-CM

## 2024-01-30 PROBLEM — G63 POLYNEUROPATHY ASSOCIATED WITH UNDERLYING DISEASE (H): Status: ACTIVE | Noted: 2024-01-30

## 2024-01-30 PROCEDURE — 99215 OFFICE O/P EST HI 40 MIN: CPT | Performed by: FAMILY MEDICINE

## 2024-01-30 RX ORDER — RESPIRATORY SYNCYTIAL VIRUS VACCINE 120MCG/0.5
0.5 KIT INTRAMUSCULAR ONCE
Qty: 1 EACH | Refills: 0 | Status: CANCELLED | OUTPATIENT
Start: 2024-01-30 | End: 2024-01-30

## 2024-01-30 ASSESSMENT — ANXIETY QUESTIONNAIRES
1. FEELING NERVOUS, ANXIOUS, OR ON EDGE: SEVERAL DAYS
3. WORRYING TOO MUCH ABOUT DIFFERENT THINGS: SEVERAL DAYS
GAD7 TOTAL SCORE: 5
8. IF YOU CHECKED OFF ANY PROBLEMS, HOW DIFFICULT HAVE THESE MADE IT FOR YOU TO DO YOUR WORK, TAKE CARE OF THINGS AT HOME, OR GET ALONG WITH OTHER PEOPLE?: SOMEWHAT DIFFICULT
7. FEELING AFRAID AS IF SOMETHING AWFUL MIGHT HAPPEN: NOT AT ALL
5. BEING SO RESTLESS THAT IT IS HARD TO SIT STILL: SEVERAL DAYS
2. NOT BEING ABLE TO STOP OR CONTROL WORRYING: NOT AT ALL
GAD7 TOTAL SCORE: 5
IF YOU CHECKED OFF ANY PROBLEMS ON THIS QUESTIONNAIRE, HOW DIFFICULT HAVE THESE PROBLEMS MADE IT FOR YOU TO DO YOUR WORK, TAKE CARE OF THINGS AT HOME, OR GET ALONG WITH OTHER PEOPLE: SOMEWHAT DIFFICULT
7. FEELING AFRAID AS IF SOMETHING AWFUL MIGHT HAPPEN: NOT AT ALL
6. BECOMING EASILY ANNOYED OR IRRITABLE: SEVERAL DAYS
GAD7 TOTAL SCORE: 5
4. TROUBLE RELAXING: SEVERAL DAYS

## 2024-01-30 ASSESSMENT — PATIENT HEALTH QUESTIONNAIRE - PHQ9
SUM OF ALL RESPONSES TO PHQ QUESTIONS 1-9: 4
SUM OF ALL RESPONSES TO PHQ QUESTIONS 1-9: 4
10. IF YOU CHECKED OFF ANY PROBLEMS, HOW DIFFICULT HAVE THESE PROBLEMS MADE IT FOR YOU TO DO YOUR WORK, TAKE CARE OF THINGS AT HOME, OR GET ALONG WITH OTHER PEOPLE: SOMEWHAT DIFFICULT

## 2024-01-30 ASSESSMENT — PAIN SCALES - GENERAL: PAINLEVEL: NO PAIN (0)

## 2024-01-30 ASSESSMENT — ENCOUNTER SYMPTOMS: HEADACHES: 1

## 2024-01-30 NOTE — PROGRESS NOTES
Assessment & Plan     Type 2 diabetes mellitus with hyperglycemia, with long-term current use of insulin (H)  A1C improving from 9.6 to 8.9 today.  Just took first higher dose (2mg) of ozempic this past week, did feel dizzy after taking, but realized she didn't eat lunch.  Will make sure she eats well with next dose and see if sx's return.  Cont follow-up with MTM.  Follow-up in 3-4 months with A1c prior with PCP.  - Lipid panel reflex to direct LDL Fasting; Future  - **Hemoglobin A1c FUTURE 3mo; Future  - **TSH with free T4 reflex FUTURE 2mo; Future  - **Basic metabolic panel FUTURE 2mo; Future  - Albumin Random Urine Quantitative with Creat Ratio; Future    Physical deconditioning  Getting lightheaded/SOB with walking more than a block, likely deconditioned with minimal walking prior to hip surgery.  Pulmonary recently referred her to PT (likely pulmonary rehab), and strongly recommended this as a great option.  They will call to schedule.    Follicular bronchiolitis (H)  Cont follow-up with Dr. Tillman.      GAVE (gastric antral vascular ectasia), h/o anemia, B12 deficiency  Rechecked CBC after going off iron after last visit a few months ago, and hgb looks great at 13.6.  --Will have her continue off iron.  Also reviewed normal B12 in 500s-   --Will continue B12 at 3d/wk to keep around this level.    Major depressive disorder, recurrent episode, moderate (H)  Mood okay, though sad to be moving out of house (lived there since 1956) to senior apartment.    Chronic heart failure with preserved ejection fraction (H)  Paroxysmal atrial fibrillation (H)  Stable sx's, cont follow-up with cardiology.    Gout, unspecified cause, unspecified chronicity, unspecified site  Seronegative rheumatoid arthritis (H)  --Stable sx's, no recent gout attacks (last ~10/23), uric acid in 4's a couple weeks ago.  Will continue follow-up with Dr. Lopez.    Essential hypertension with goal blood pressure less than 140/90/Stage 3a chronic  kidney disease (H)  BP in okay control.  Will continue current meds, labs as below.  GFR better in last few months, in 60s, will see if stable going forward.  - **Basic metabolic panel FUTURE 2mo; Future  - Albumin Random Urine Quantitative with Creat Ratio; Future    Hyperlipidemia LDL goal <100  - Lipid panel reflex to direct LDL Fasting; Future    Need for vaccination against respiratory syncytial virus  Rec getting this at pharmacy- she is high risk.         Follow-up Visit   Expected date:  May 15, 2024 (Approximate)      Follow Up Appointment Details:     Follow-up with whom?: Me    Follow-Up for what?: Medicare Wellness    Any Additional Chronic Condition Management?:  Diabetes  Hyperlipidemia  Hypertension       Welcome or Annual?: Annual Wellness    How?: In Person                           I spent a total of 45 minutes on the day of the visit.   Time spent by me doing chart review, history and exam, documentation and further activities per the note                  Subjective   Betty is a 83 year old, presenting for the following health issues:  Diabetes, Lipids, and Atrial Fib        1/30/2024     1:24 PM   Additional Questions   Roomed by Gricelda     Headache      Answers submitted by the patient for this visit:  Patient Health Questionnaire (Submitted on 1/30/2024)  If you checked off any problems, how difficult have these problems made it for you to do your work, take care of things at home, or get along with other people?: Somewhat difficult  PHQ9 TOTAL SCORE: 4  DELORES-7 (Submitted on 1/30/2024)  DELORES 7 TOTAL SCORE: 5  Refill on Acyclovir ointment           Diabetes Follow-up  How often are you checking your blood sugar? Two times daily  Blood sugar testing frequency justification:  Adjustment of medication(s)  What time of day are you checking your blood sugars (select all that apply)?  Before meals  Have you had any blood sugars above 200?  Yes   Have you had any blood sugars below 70?  No  What  symptoms do you notice when your blood sugar is low?  Weak and Lethargy  What concerns do you have today about your diabetes? None and Other: Medication Dosages    Do you have any of these symptoms? (Select all that apply)  Numbness in feet and Burning in feet    A1C improved from 9.6 to 8.9.  Still taking lantus insulin at 30 units.  Yesterday, took her first dose of increased ozempic at 2mg, and she was a little loopy/dizzy. Didn't want to drive.  Lasted few hours.  Upon discussion she notes she had missed lunch that day helping Yvette pack up her sister's rooms (lost 2 sisters in a month or two).    Walking sx's- little dizzy/SOB with longer walks since surgery.  Tends to happen if walking more than a block- fine in the house, fine standing up.  Feels she needs to stop, bit SOB.  Stops and rests for a minute, sits if someplace to sit, then feels mostly okay moving on.  Was using wheelchair/walker for a few months prior, minimal exercise.  Unsure if she hasn't gained enough stamina after surgery.    Did get a pulmonary PT referral through Dr. Tillman (pulmonary) last month- hasn't scheduled yet.  [facility].  Urged her to get it scheduled.    Left rib pain- occasionally sharp pains, more if she's been leaning or lying on left side, few seconds, then gone.    Mood- Is sad - in process of moving out of house - lived there since 1956, moving to senior care home (where you 'go to die').  In senior apartment- chose the one with a better view, not the more with more sun.  Will miss the sun a lot- will need to walk to get to the sun.      Follow-up anemia-   3d/wk iron- discussed stopping at the 10/23 visit-   Did stop, and 1/16/23 CBC/ferritin looks good still, okay to stay off.  Had an accident, didn't make it- happened very infrequently.      Urologist- put her on a new pill last week- [id number].  Had more headaches, more head squeezing sensation.        BP Readings from Last 2 Encounters:   01/30/24 133/83   01/17/24  "113/73     Hemoglobin A1C (%)   Date Value   01/16/2024 8.9 (H)   10/04/2023 9.6 (H)   06/04/2021 7.1 (H)   03/03/2021 8.0 (H)     LDL Cholesterol Calculated (mg/dL)   Date Value   03/14/2023 22   12/09/2021 16   10/20/2020 19   01/21/2020 1             Atrial Fibrillation Follow-up  Symptoms: chest pain -  Pain in the LUQ (Ribs) and dizziness/lightheadedness  Stroke prevention: Left Atrial Appendage Device        10/11/2023     1:15 PM 11/3/2023     1:38 PM 11/16/2023    12:31 PM 1/17/2024    11:43 AM 1/30/2024     1:36 PM   Date   Pulse 63 57 55 72 55     Current [id number] Score: 6 points - A score of 5 or greater represents a 7.2 - 12.2% annual risk of major embolic event, without anti-coagulation or an LAAO device.   **Pt has LAAO device.        Review of Systems  Constitutional, HEENT, cardiovascular, pulmonary, gi and gu systems are negative, except as otherwise noted.        Objective    /83 (BP Location: Left arm, Patient Position: Sitting, Cuff Size: Adult Regular)   Pulse 55   Temp 97.4  F (36.3  C) (Temporal)   Resp 18   Ht 1.669 m (5' 5.7\")   Wt 82.4 kg (181 lb 11.2 oz)   LMP  (LMP Unknown)   SpO2 95%   Breastfeeding No   BMI 29.60 kg/m    Body mass index is 29.6 kg/m .  Physical Exam   GENERAL: alert and no distress  EYES: Eyes grossly normal to inspection, PERRL and conjunctivae and sclerae normal  NECK: no adenopathy, no asymmetry, masses, or scars  RESP: lungs clear to auscultation - no rales, rhonchi or wheezes  CV: regular rate and rhythm, normal S1 S2, no S3 or S4, no murmur, click or rub, no peripheral edema  ABDOMEN: soft, nontender, no hepatosplenomegaly, no masses and bowel sounds normal  MS: no gross musculoskeletal defects noted, no edema    Lab on 01/16/2024   Component Date Value Ref Range Status    Hemoglobin A1C 01/16/2024 8.9 (H)  <5.7 % Final    Normal <5.7%   Prediabetes 5.7-6.4%    Diabetes 6.5% or higher     Note: Adopted from ADA consensus guidelines.    Ferritin " 01/16/2024 103  11 - 328 ng/mL Final    Erythrocyte Sedimentation Rate 01/16/2024 20  0 - 30 mm/hr Final    CRP Inflammation 01/16/2024 6.83 (H)  <5.00 mg/L Final    Uric Acid 01/16/2024 4.0  2.4 - 5.7 mg/dL Final    C4 Complement 01/16/2024 33  13 - 39 mg/dL Final    C3 Complement 01/16/2024 134  81 - 157 mg/dL Final    Vitamin B12 01/16/2024 573  232 - 1,245 pg/mL Final    ALT 01/16/2024 10  0 - 50 U/L Final    Albumin 01/16/2024 4.2  3.5 - 5.2 g/dL Final    AST 01/16/2024 18  0 - 45 U/L Final    Creatinine 01/16/2024 0.89  0.51 - 0.95 mg/dL Final    GFR Estimate 01/16/2024 64  >60 mL/min/1.73m2 Final    WBC Count 01/16/2024 9.5  4.0 - 11.0 10e3/uL Final    RBC Count 01/16/2024 4.61  3.80 - 5.20 10e6/uL Final    Hemoglobin 01/16/2024 13.6  11.7 - 15.7 g/dL Final    Hematocrit 01/16/2024 42.1  35.0 - 47.0 % Final    MCV 01/16/2024 91  78 - 100 fL Final    MCH 01/16/2024 29.5  26.5 - 33.0 pg Final    MCHC 01/16/2024 32.3  31.5 - 36.5 g/dL Final    RDW 01/16/2024 14.0  10.0 - 15.0 % Final    Platelet Count 01/16/2024 210  150 - 450 10e3/uL Final    % Neutrophils 01/16/2024 73  % Final    % Lymphocytes 01/16/2024 12  % Final    % Monocytes 01/16/2024 11  % Final    % Eosinophils 01/16/2024 2  % Final    % Basophils 01/16/2024 1  % Final    % Immature Granulocytes 01/16/2024 1  % Final    NRBCs per 100 WBC 01/16/2024 0  <1 /100 Final    Absolute Neutrophils 01/16/2024 7.0  1.6 - 8.3 10e3/uL Final    Absolute Lymphocytes 01/16/2024 1.1  0.8 - 5.3 10e3/uL Final    Absolute Monocytes 01/16/2024 1.1  0.0 - 1.3 10e3/uL Final    Absolute Eosinophils 01/16/2024 0.2  0.0 - 0.7 10e3/uL Final    Absolute Basophils 01/16/2024 0.1  0.0 - 0.2 10e3/uL Final    Absolute Immature Granulocytes 01/16/2024 0.1  <=0.4 10e3/uL Final    Absolute NRBCs 01/16/2024 0.0  10e3/uL Final    Total Protein Serum for ELP 01/16/2024 7.0  6.4 - 8.3 g/dL Final    Albumin 01/16/2024 3.8  3.7 - 5.1 g/dL Final    Alpha 1 01/16/2024 0.3  0.2 - 0.4 g/dL  Final    Alpha 2 01/16/2024 0.8  0.5 - 0.9 g/dL Final    Beta Globulin 01/16/2024 1.1 (H)  0.6 - 1.0 g/dL Final    Gamma Globulin 01/16/2024 1.1  0.7 - 1.6 g/dL Final    Monoclonal Peak 01/16/2024 0.0  <=0.0 g/dL Final    ELP Interpretation 01/16/2024 Essentially normal electrophoretic pattern. No obvious monoclonal proteins seen. Pathologic significance requires clinical correlation. Meghan Brothers M.D., Ph.D.   Final           Signed Electronically by: Ilene Tristan MD

## 2024-02-08 DIAGNOSIS — Z79.52 CURRENT CHRONIC USE OF SYSTEMIC STEROIDS: ICD-10-CM

## 2024-02-08 DIAGNOSIS — M85.80 OSTEOPENIA, UNSPECIFIED LOCATION: ICD-10-CM

## 2024-02-13 ENCOUNTER — PRE VISIT (OUTPATIENT)
Dept: UROLOGY | Facility: CLINIC | Age: 84
End: 2024-02-13
Payer: COMMERCIAL

## 2024-02-13 RX ORDER — ALENDRONATE SODIUM 70 MG/1
70 TABLET ORAL
Qty: 12 TABLET | Refills: 1 | Status: SHIPPED | OUTPATIENT
Start: 2024-02-13 | End: 2024-06-11 | Stop reason: ALTCHOICE

## 2024-02-13 NOTE — TELEPHONE ENCOUNTER
ALENDRONATE SODIUM 70 MG TAB   Last Written Prescription Date:  5/24/2023  Last Fill Quantity: 12,   # refills: 1  Last Office Visit : 11/3/2023  Future Office visit:  2/28/2024  12 Tabs, 1 Refill sent to pharm     Mere Weinberg RN  Central Triage Red Flags/Med Refills    Bisphosphonates [id number]/2024 12:19 AM   Protocol Details Dexa scan completed in the past 48-months    Medication is active on med list    Medication indicated for associated diagnosis    Recent (12 mo) or future (90 days) visit within the authorizing provider's specialty    Patient is age 18 or older    Normal Creatinine Clearance  within past 12 months       To be filled at: [facility]/pharmacy #[id number] - [place], [place] - [address]

## 2024-02-13 NOTE — TELEPHONE ENCOUNTER
Reason for visit: follow up      Relevant information: urinary urgency, incontinence    Records/imaging/labs/orders: in epic    Pt called: No need for a call    At Rooming: virtual visit    Willie [place]  2/13/2024  9:42 AM

## 2024-02-26 NOTE — PROGRESS NOTES
STRUCTURAL HEART CLINIC  H&P    Referring Provider: Dr. Crews  Primary Cardiologist: Dr. Rosa     History of Present Illness  Betty Tee is a 82 year old female who presents for pre-operative H&P in preparation for Watchman left atrial appendage closure device on 9/26/22 at [facility].     Patient has a history of paroxysmal atrial fibrillation on Xarelto with [id number] of 6 (CHF, HTN, age, DM, female) and HAS BLED of 5 (HTN, CKD, age, prior bleed, meds) with intolerance to anticoagulation due to recurrent GI bleeding. She has had 3 EGDs and 1 colonoscopy in the past 6 months due to recurrent GIB and anemia. 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 She was seen in structural clinic where she was deemed an appropriate Watchman candidate. She was counseled for the above procedure.     Her remaining medical history is also notable for HFpEF, interstitial lung disease, Sjogren's syndrome, HTN, type II DM, severe hip OA mainly right, chronic pain, diverticulitis s/p colectomy 2011 with subsequent takedown.     Has been stable from a cardiac standpoint. No recent bleeding events. No infectious symptoms. She is struggling with severe right hip pain and is in need of hip replacement but can not be done until after the Watchman.      History of blood transfusions/reactions: No   History of abnormal bleeding/anti-platelet use: Yes history of bleeding   Steroid use in the last year: Yes on daily for Sjogren's  Personal or family history with difficulty with anesthesia: No   Infection precautions: No  Difficulty w/bedrest: Possibly severe hip arthritis     Current Medications:  Current Outpatient Medications   Medication Sig Dispense Refill     ACCU-CHEK SHANNON PLUS test strip USE TO TEST BLOOD SUGARS 3 TIMES DAILY 300 strip 5     acetaminophen (TYLENOL) 500 MG tablet Take 1,000 mg by mouth every 8 hours as needed (max 6 tablets/24 hours, 2 tablets/dose)  (Patient not taking: Reported on  Opened in error   9/6/2022)       acyclovir (ZOVIRAX) 400 MG tablet Take 1 tablet (400 mg) by mouth 3 times daily For a couple days 15 tablet 2     acyclovir (ZOVIRAX) 5 % external ointment Apply topically 6 times daily As needed for outbreaks 15 g 3     albuterol (PROAIR HFA/PROVENTIL HFA/VENTOLIN HFA) 108 (90 Base) MCG/ACT inhaler Inhale 2 puffs into the lungs every 6 hours 18 g 11     alendronate (FOSAMAX) 70 MG tablet Take 1 tablet (70 mg) by mouth every 7 days 12 tablet 0     anakinra (KINERET) 100 MG/0.67ML SOSY injection 100 mg every day x 3 days as needed for flare ups 6.7 mL 3     atorvastatin (LIPITOR) 10 MG tablet TAKE 1/2 TABLET BY MOUTH EVERY DAY 45 tablet 2     azithromycin (ZITHROMAX) 250 MG tablet TAKE 1 TABLET BY MOUTH EVERY DAY 30 tablet 5     BD PEN NEEDLE JANE 2ND GEN 32G X 4 MM miscellaneous USE 4 DAILY AS DIRECTED. 400 each 1     blood glucose (NO BRAND SPECIFIED) lancets standard Use to test blood sugar 3 times daily or as directed 100 lancet 3     cevimeline (EVOXAC) 30 MG capsule Take 1 capsule (30 mg) by mouth 3 times daily (Patient taking differently: Take 30 mg by mouth twice a week On Tuesdays and Fridays) 270 capsule 2     Cholecalciferol (VITAMIN D3) 50 MCG (2000 UT) CAPS TAKE 100 MCG BY MOUTH DAILY (TAKE 2 TABLET (50 MCG) BY MOUTH DAILY - ORAL) 90 capsule 1     COMPOUNDED NON-CONTROLLED SUBSTANCE (CMPD RX) - PHARMACY TO MIX COMPOUNDED MEDICATION Estriol 1 mg/g Apply small amount to finger and apply to inside vagina daily for 2 weeks then twice weekly Route: vaginally Dispense 30 grams 11 refills (Patient taking differently: Estriol 1 mg/g Apply small amount to finger and apply to inside vagina twice weekly Route: vaginally Dispense 30 grams 11 refills) 30 g 11     cyanocobalamin (VITAMIN B-12) 1000 MCG tablet Take 1 tablet (1,000 mcg) by mouth daily 30 tablet 1     escitalopram (LEXAPRO) 20 MG tablet Take 1 tablet (20 mg) by mouth daily 90 tablet 0     ferrous sulfate (FEROSUL) 325 (65 Fe) MG tablet  TAKE 1 TABLET BY MOUTH EVERY DAY WITH BREAKFAST 90 tablet 0     fluticasone (FLONASE) 50 MCG/ACT nasal spray SPRAY 1-2 SPRAYS INTO BOTH NOSTRILS DAILY AS NEEDED FOR ALLERGIES 16 mL 11     fluticasone-vilanterol (BREO ELLIPTA) 100-25 MCG/INH inhaler Inhale 1 puff into the lungs daily 1 each 11     HYDROcodone-acetaminophen (NORCO) 7.5-325 MG per tablet Take 1 tablet by mouth every 12 hours OK to fill and start 08/12/22 60 tablet 0     hydroxychloroquine (PLAQUENIL) 200 MG tablet Take 2 tablets (400 mg) by mouth daily Get annual eye exams for hydroxychloroquine (Plaquenil) monitoring and fax to [phone] 180 tablet 3     insulin glargine (BASAGLAR KWIKPEN) 100 UNIT/ML pen INJECT 22 UNITS SUBCUTANEOUS DAILY (TO REPLACE LANTUS) (Patient taking differently: INJECT 24 UNITS SUBCUTANEOUS DAILY (TO REPLACE LANTUS)) 15 mL 1     ketoconazole (NIZORAL) 2 % external cream Apply topically 2 times daily as needed for itching 60 g 1     KLOR-CON 20 MEQ CR tablet TAKE 2 TABLETS (40 MEQ) BY MOUTH EVERY MORNING AND 1 TABLET (20 MEQ) EVERY EVENING. 270 tablet 3     lidocaine (LIDODERM) 5 % patch APPLY PATCH TO PAINFUL AREA FOR UP TO 12 H WITHIN A 24 H PERIOD. REMOVE AFTER 12 HOURS. (Patient not taking: Reported on 9/6/2022) 30 patch 2     loratadine (CLARITIN) 10 MG tablet TAKE 1 TABLET BY MOUTH EVERY DAY 90 tablet 3     methocarbamol (ROBAXIN) 750 MG tablet Take 1 tablet (750 mg) by mouth 3 times daily as needed for muscle spasms (Patient not taking: Reported on 9/6/2022) 90 tablet 3     metoprolol succinate ER (TOPROL-XL) 50 MG 24 hr tablet Take 1 tablet (50 mg) by mouth 2 times daily 90 tablet 3     montelukast (SINGULAIR) 10 MG tablet TAKE 1 TABLET BY MOUTH EVERYDAY AT BEDTIME 90 tablet 0     NOVOLOG FLEXPEN 100 UNIT/ML soln INJECT 12 UNITS SUBCUTANEOUS 3 TIMES DAILY (WITH MEALS) (Patient taking differently: 11 Units) 15 mL 1     OZEMPIC, 1 MG/DOSE, 4 MG/3ML SOPN INJECT 1 MG SUBCUTANEOUS ONCE A WEEK 3 mL 2     pantoprazole  (PROTONIX) 40 MG EC tablet TAKE 1 TABLET (40 MG) BY MOUTH DAILY (REPLACES FAMOTIDINE- SHOULD STOP TAKING FAMOTIDINE) 90 tablet 0     predniSONE (DELTASONE) 5 MG tablet Take 1 tablet (5 mg) by mouth daily 90 tablet 1     rivaroxaban ANTICOAGULANT (XARELTO ANTICOAGULANT) 20 MG TABS tablet TAKE 1 TABLET BY MOUTH DAILY WITH DINNER 90 tablet 3     spironolactone (ALDACTONE) 25 MG tablet Take 2 tablets (50 mg) by mouth daily 180 tablet 3     torsemide (DEMADEX) 10 MG tablet TAKE ONE TAB (10 MG) WITH ONE 20 MG TAB TO = 30 MG DAILY IN AFTERNOON. 90 tablet 3     torsemide (DEMADEX) 20 MG tablet TAKE 2 TABLETS (40 MG) BY MOUTH EVERY MORNING AND 1 TABLET (20 MG) DAILY AT 2 PM. TAKE THE AFTERNOON DOSE WITH AN EXTRA 10 MG TAB FOR A TOTAL OF 30 MG IN THE AFTERNOON 270 tablet 3     traZODone (DESYREL) 50 MG tablet Take 3 tablets (150 mg) by mouth nightly as needed for sleep 135 tablet 1       Allergies:     Allergies   Allergen Reactions     Amoxicillin-Pot Clavulanate      Augmentin Nausea and Vomiting     Codeine Nausea and Vomiting     Codeine      PN: LW Reaction: HIVES     Penicillins Nausea and Vomiting     PN: LW Reaction: GI Upset     Phenobarbital Itching     Phenobarbital      Seasonal Allergies        Past Medical History:  Past Medical History:   Diagnosis Date     Alcohol abuse, in remission      Allergic rhinitis, cause unspecified     allegra helps when she takes it     Antiplatelet or antithrombotic long-term use      Atrial fibrillation (H)     in hosp in 11/11 after surgery w/ fluid overload     Cardiomegaly     LVH on stress echo- cardiac w/u at [facility]- neg CT scan for PE, neg stress echo in 8/06     Chest pain, unspecified      Congestive heart failure (H)      Depressive disorder      Diabetes (H)      Disorder of bone and cartilage, unspecified     osteopenia (had been on prempro), improved on 6/06 dexa, stable dexa 11/10     Diverticulosis of colon (without mention of hemorrhage)     last episode yrs ago      Essential hypertension, benign      Follicular bronchiolitis (H)     associated with Sjogrens, dx by chest CT showing mosaic attenuation and air trapping     Gastro-oesophageal reflux disease      ILD (interstitial lung disease) (H)     associated with Sjogrens, also has mildly elevated IgG4, first noted on chest CT 2015 (mild changes) and also has small airways disease; ILD improved on follow up chest CT 2018.     Insomnia, unspecified     weaned off clonazepam     Irregular heart beat      Lumbago 7/09    MRI with DJD, now seeing Dr. Cain for sciatic sx's     Major depressive disorder, recurrent episode, moderate (H)      Obstructive sleep apnea     uses cpap     Osteoarthrosis, unspecified whether generalized or localized, unspecified site      Sjogren's syndrome (H)     + RG and SSA and lip bx     Sleep apnea      Tobacco use disorder     chantix in 9/07, started again in 6/08, working       Past Surgical History:  Past Surgical History:   Procedure Laterality Date     APPENDECTOMY       BACK SURGERY  1962     BACK SURGERY  1962     BIOPSY BREAST  09/27/2002    Biopsy Left Breast     BIOPSY BREAST Left 09/27/2002     BREAST SURGERY       CARDIAC SURGERY       CARDIAC SURGERY       CHOLECYSTECTOMY  1990's?     CHOLECYSTECTOMY       COLECTOMY LEFT  11/07/2011    Procedure:COLECTOMY LEFT; Laparoscopic mobilization of splenic flexture, sigmoid colectomy, coloprotoscopy, loop illeostomy; Surgeon:CK CASTANEDA; Location:[facility] OR     COLECTOMY LEFT  11/07/2011     COLONOSCOPY  1990,s     COLONOSCOPY N/A 5/3/2022    Procedure: COLONOSCOPY;  Surgeon: Guru Winsome Dias MD;  Location: [facility]     ENT SURGERY       EYE SURGERY  2012     FLEXIBLE SIGMOIDOSCOPY  11/03/2011     HYSTERECTOMY TOTAL ABDOMINAL, BILATERAL SALPINGO-OOPHORECTOMY, COMBINED  11/07/2011    Procedure:COMBINED HYSTERECTOMY TOTAL ABDOMINAL, BILATERAL SALPINGO-OOPHORECTOMY; total abdominal hysterectomy, bilateral  salpingo-oophorectomy; Surgeon:ALETA MANUEL; Location:[facility] OR     HYSTERECTOMY TOTAL ABDOMINAL, BILATERAL SALPINGO-OOPHORECTOMY, COMBINED  11/07/2011     ILEOSTOMY  02/01/2012    takedown loop ileostomy      INSERT STENT URETER  11/07/2011    Procedure:INSERT STENT URETER; Placement of Bilateral Ureteral Stents ; Surgeon:PRANEETH BRYANT; Location:[facility] OR     SIGMOIDECTOMY  02/01/2012    Dr. Siddiqi, Sigmoidectomy for diverticular abscess, iliostomy afterwards until repair     SIGMOIDOSCOPY FLEXIBLE  11/03/2011    Procedure:SIGMOIDOSCOPY FLEXIBLE; Flexible Sigmoidoscopy; Surgeon:CK SIDDIQI; Location:[facility] OR     TAKEDOWN ILEOSTOMY  02/01/2012    Procedure:TAKEDOWN ILEOSTOMY; Takedown Loop Ileostomy ; Surgeon:CK SIDDIQI; Location:[facility] OR     URETERAL STENT PLACEMENT  11/07/2011     ZZC APPENDECTOMY  1970's?     ZZC NONSPECIFIC PROCEDURE  11/2005    exploratory abd lap, adhesions, resolved RLQ pain, diverticulitis episodes       Family History:  Family History   Problem Relation Age of Onset     C.A.D. Mother 63        MI- first at age 63     Heart Disease Mother      Hypertension Mother      Cerebrovascular Disease Mother      Hyperlipidemia Mother      Coronary Artery Disease Mother         MI-first at age 63     Other - See Comments Mother         cerebrovascular disease      Alcohol/Drug Father      Alzheimer Disease Father      Dementia Father      Hypertension Father      Hyperlipidemia Father      Substance Abuse Father      Diabetes Sister      Hypertension Sister      Hyperlipidemia Sister      Substance Abuse Sister      Asthma Sister      C.A.D. Sister 52        Minor MI- age 50's     Heart Disease Sister      Hypertension Sister      Hypertension Sister      Cancer - colorectal Sister 48        Late 40's early 50's     Gastrointestinal Disease Sister         Diverticulitis     Lipids Sister      Lipids Sister      Diabetes Sister      Heart Disease Sister         CHF     Cancer Sister         lung,  smoker     Substance Abuse Sister      Asthma Sister      Cancer Sister      Coronary Artery Disease Sister         minor MI-age 50's     Heart Disease Sister      Hypertension Sister      Hypertension Sister      Colon Cancer Sister      Diverticulitis Sister      Lung Cancer Sister      Heart Disease Sister         CHF     Diabetes Sister      Asthma Sister      Hypertension Brother      Prostate Cancer Brother 74        Dx'd age 74     Gastrointestinal Disease Brother         Diverticulitis     Parkinsonism Brother      Substance Abuse Brother      Prostate Cancer Brother      Hyperlipidemia Brother      Hypertension Brother      Prostate Cancer Brother      Diverticulitis Brother      Parkinsonism Brother      Breast Cancer Daughter      Breast Cancer Daughter      Diabetes Other      Hypertension Other      Anesthesia Reaction No family hx of      Deep Vein Thrombosis (DVT) No family hx of        Social History:  Social History     Socioeconomic History     Marital status: Single     Number of children: 0     Years of education: Ed Spec De   Occupational History     Occupation: Professor     Employer: [employer]     Comment: [employer]- Education   Tobacco Use     Smoking status: Former Smoker     Packs/day: 0.50     Years: 10.00     Pack years: 5.00     Types: Cigarettes     Quit date: 2011     Years since quittin.1     Smokeless tobacco: Never Used     Tobacco comment:  ppd   Substance and Sexual Activity     Alcohol use: No     Alcohol/week: 0.0 standard drinks     Comment: In recovery beginning      Drug use: No     Sexual activity: Never   Other Topics Concern     Parent/sibling w/ CABG, MI or angioplasty before 65F 55M? Yes   Social History Narrative                 Review of Systems:  Functional status: Independent in ADL's. Unable to achieve METS > 4 due to hip pain.     Constitutional: No fever, chills, or sweats. No weight gain/loss   ENT: No  "visual disturbance, ear ache, epistaxis, sore throat  Allergies/Immunologic: Negative.   Respiratory: No cough, hemoptysia  Cardiovascular: As per HPI  GI: No nausea, vomiting, hematemesis, melena, or hematochezia  : No urinary frequency, dysuria, or hematuria  Integument: Negative  Psychiatric: Negative  Neuro: Negative  Endocrinology: Negative   Musculoskeletal: Negative      Physical Exam:  Vitals: /71 (BP Location: Left arm, Patient Position: Sitting, Cuff Size: Adult Regular)   Pulse 57   Ht 1.702 m (5' 7\")   Wt 86.1 kg (189 lb 14.4 oz)   SpO2 96%   BMI 29.74 kg/m     General: NAD  HEENT:  Dentition intact.    Neck: No jugular venous distension.   Heart: S1S2  Lungs: CTA.    Abdomen: Soft, nontender, nondistended.   Extremities: No clubbing, cyanosis, or edema.  The pulses are +4/4 at the radial, brachial, femoral, popliteal, DP, and PT sites bilaterally.  No bruits are noted.  Neurologic: Alert and oriented to person/place/time, normal speech, gait and affect  Skin: No petechiae, purpura or rash.      Diagnostic Studies:      ECG 9/19/22:  Personally reviewed and interpreted by me.  SB first degree AVB    Cardiac CT 8/5/22:                                                                   IMPRESSION:  1.  The left atrial appendage depth is: 25.0 mm.  2.  The left atrial appendage landing zone maximal diameter is 21.2 mm  and the minimal diameter is 18.7 mm; the landing zone circumference is   62.7 mm.   3.  The left atrial appendage is free of thrombus.      FINDINGS:   1.  The left atrial appendage has a  mixed (windsock/chicken wing),  predominantly windsock morphology.  2.  The left atrial appendage depth is: 25.0 mm.  3.  The left atrial appendage landing zone average diameter is 19.3 mm  (21.2 mm x 18.7 mm).  4.  The left atrial appendage landing zone perimeter is  62.7 mm.  5.  Normal pulmonary venous anatomy with all pulmonary veins draining  into the left atrium.  6.  Coronary images are " non-diagnostic for stenosis/atherosclerosis  due to cardiac motion artifact.  There are mild to moderate coronary  artery calcifications.  7.  The visualized aortic root, ascending aorta and descending  thoracic aorta are of normal caliber with mild to moderate  calcification of descending aorta.  8.  Please review the separate Radiology report from the original  study site and date for incidental noncardiac findings.     I have personally reviewed the examination and initial interpretation  and I agree with the findings.     JAMES CRAWFORD MD     Laboratory Studies:  Personally reviewed and interpreted by me.    LIPID RESULTS:  Lab Results   Component Value Date    CHOL 104 12/09/2021    CHOL 115 10/20/2020    HDL 59 12/09/2021    HDL 71 10/20/2020    LDL 16 12/09/2021    LDL 19 10/20/2020    TRIG 145 12/09/2021    TRIG 125 10/20/2020    CHOLHDLRATIO 2.5 08/06/2014       LIVER ENZYME RESULTS:  Lab Results   Component Value Date    AST 13 08/23/2022    AST 17 06/04/2021    ALT 14 08/23/2022    ALT 25 06/04/2021       CBC RESULTS:  Lab Results   Component Value Date    WBC 7.6 08/23/2022    WBC 8.6 06/04/2021    RBC 4.03 08/23/2022    RBC 4.46 06/04/2021    HGB 12.3 08/23/2022    HGB 13.4 06/04/2021    HCT 39.0 08/23/2022    HCT 42.1 06/04/2021    MCV 97 08/23/2022    MCV 94 06/04/2021    MCH 30.5 08/23/2022    MCH 30.0 06/04/2021    MCHC 31.5 08/23/2022    MCHC 31.8 06/04/2021    RDW 14.9 08/23/2022    RDW 15.5 (H) 06/04/2021     08/23/2022     06/04/2021       BMP RESULTS:  Lab Results   Component Value Date     08/23/2022     06/04/2021    POTASSIUM 4.1 08/23/2022    POTASSIUM 4.0 06/04/2021    CHLORIDE 107 08/23/2022    CHLORIDE 106 06/04/2021    CO2 22 08/23/2022    CO2 25 06/04/2021    ANIONGAP 8 08/23/2022    ANIONGAP 6 06/04/2021     (H) 08/23/2022     (H) 06/04/2021    BUN 17 08/23/2022    BUN 14 06/04/2021    CR 1.08 (H) 08/23/2022    CR 1.11 (H) 06/04/2021     GFRESTIMATED 51 (L) 08/23/2022    GFRESTIMATED 47 (L) 06/04/2021    GFRESTBLACK 54 (L) 06/04/2021    CLAUDY 8.6 08/23/2022    CLAUDY 8.8 06/04/2021        A1C RESULTS:  Lab Results   Component Value Date    A1C 6.4 (H) 08/23/2022    A1C 7.1 (H) 06/04/2021       INR RESULTS:  Lab Results   Component Value Date    INR 1.36 (H) 03/03/2020    INR 2.37 (H) 02/14/2019       Assessment and Plan     Betty Tee is a 82 year old female who presents for pre-operative H&P in preparation for Watchman left atrial appendage closure device on 9/26/22 at [facility].     She has the following specific operative considerations:      - RCRI score 2 corresponding with a 2.4% perioperative risk of MACE      - ANGELICA #: known ANGELICA       - VTE risk = 8. If equal to or > 4, pharmacologic prophylaxis is indicated      - Risk of PONV score = 3. If > 2, anti-emetic intervention recommended    1. Paroxysmal a-fib with intolerance to AC:   2. Recurrent GI bleeding secondary to GAVE:  Patient has a history of recurrent GIB and anemia with several EGDs and colonoscopies over the past 6 months. 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 She was referred to structural clinic by her GI team and was deemed an appropriate Watchman candidate. She was counseled for the above procedure. We reviewed pre and post procedure expectations, follow-up instructions, risks and benefits of the procedure. Patient wishes to proceed.    Hold Xarelto 48 hours pre procedure  Continue metoprolol DOS  Continue protonix for GI protection  All questions answered  Type and screen orders complete  COVID test ordered  Supplies for scrubbing provided  No known contrast dye allergy  No trouble with anesthesia in the past  Pre procedure labs pending, no s/s of infection    3. Chronic diastolic heart failure: Currently appears euvolemic, maintained on torsemide 40 mg a.m./30 mg p.m and spironolactone 50 mg daily, hold both DOS.    4. Hypertension: Currently on  sprionolactone, hold DOS.     5. Hyperlipidemia: Continue atorvastatin DOS.    6. Type II DM: Insulin dependent with novolog and lantus. Also on Ozempic. Hold oral antidiabetics and short acting insulin morning of procedure. Take 1/2 dose lantus.    7. Severe right hip pain 2/2 OA: Patient in severe pain, recommend continuing current norco regimen and will arrange urgent appointment with Dr. Alab her pain management specialist. She will return to Dr. Shannon post Watchman, will be okay to hold AC and undergo right hip replacement 45 days post watchman.     8. PMR: Continue daily prednisone, okay to take DOS    9. Sjogrens: Continue Cevimeline for dry mouth    10. Gout: PRN anakinra for gout    11. ANGELICA: Continue CPAP    Medication Recommendations:  Diuretic: hold lasix and tammy DOS  Antiplatelet: Start  mg night before and morning of procedure  Coumadin/NOAC: Hold Xarelto 48 hours pre-procedure, last dose 9/23, holding 9/24-9/26  BB: Continue metoprolol perioperatively without interruption  Antidiabetics: Hold oral antidiabetics and short acting insulin. Take 1/2 dose lantus.  Supplements: Hold morning of procedure    Patient is optimized and is acceptable candidate for the proposed procedure.  No further diagnostic evaluation is needed. Pre-procedure instructions provided in written format.     LIZY Gonzalez, CNP  Structural Heart Care Nurse Practitioner  [facility]  Clinic: [phone]  Pager: [phone]

## 2024-02-28 ENCOUNTER — OFFICE VISIT (OUTPATIENT)
Dept: RHEUMATOLOGY | Facility: CLINIC | Age: 84
End: 2024-02-28
Attending: INTERNAL MEDICINE
Payer: COMMERCIAL

## 2024-02-28 VITALS
DIASTOLIC BLOOD PRESSURE: 87 MMHG | BODY MASS INDEX: 29.19 KG/M2 | OXYGEN SATURATION: 96 % | HEART RATE: 64 BPM | WEIGHT: 186 LBS | HEIGHT: 67 IN | SYSTOLIC BLOOD PRESSURE: 151 MMHG

## 2024-02-28 DIAGNOSIS — M85.80 OSTEOPENIA, UNSPECIFIED LOCATION: ICD-10-CM

## 2024-02-28 DIAGNOSIS — Z79.52 CURRENT CHRONIC USE OF SYSTEMIC STEROIDS: ICD-10-CM

## 2024-02-28 DIAGNOSIS — M06.00 SERONEGATIVE RHEUMATOID ARTHRITIS (H): ICD-10-CM

## 2024-02-28 DIAGNOSIS — M35.02 SJOGREN'S SYNDROME WITH LUNG INVOLVEMENT (H): ICD-10-CM

## 2024-02-28 DIAGNOSIS — Z79.899 LONG-TERM USE OF PLAQUENIL: ICD-10-CM

## 2024-02-28 DIAGNOSIS — M10.9 GOUT, UNSPECIFIED CAUSE, UNSPECIFIED CHRONICITY, UNSPECIFIED SITE: Primary | ICD-10-CM

## 2024-02-28 PROCEDURE — G0463 HOSPITAL OUTPT CLINIC VISIT: HCPCS | Performed by: INTERNAL MEDICINE

## 2024-02-28 PROCEDURE — 99214 OFFICE O/P EST MOD 30 MIN: CPT | Performed by: INTERNAL MEDICINE

## 2024-02-28 ASSESSMENT — PAIN SCALES - GENERAL: PAINLEVEL: NO PAIN (0)

## 2024-02-28 NOTE — NURSING NOTE
"Chief Complaint   Patient presents with    RECHECK     Vital signs:      BP: (!) 151/87 Pulse: 64     SpO2: 96 %     Height: 170.2 cm (5' 7\") (pt reported) Weight: 84.4 kg (186 lb)  Estimated body mass index is 29.13 kg/m  as calculated from the following:    Height as of this encounter: 1.702 m (5' 7\").    Weight as of this encounter: 84.4 kg (186 lb).      Rosalina Coffman CMA   2/28/2024 3:50 PM    "

## 2024-02-28 NOTE — LETTER
2024       RE: Betty Tee  [address]  [facility] [address]     Dear Colleague,    Thank you for referring your patient, Betty Tee, to the [facility] [place] at [facility]. Please see a copy of my visit note below.    Rheumatology Clinic In Person Follow up Visit Note  Zulma Lopez MD  DOS:2024  Date of last visit: 11/3/2023    Name: Betty Tee  MRN: [id number]  Age: 83 year old  : 1940  Reason for visit: Follow up visit for PMR, presumed gout, primary Sjogren's syndrome    # Sjogren's syndrome since  with borderline +RG, +SSA, and lip biopsy focus score of 1. Ongoing dry mouth on evoxac qod  # Follicular bronchiolitis, prior mild ILD   # Osteopenia on DEXA 2019 with T score=-2.1 with decline in bone density on fosamax  # Chronic prednisone use  # DM  # A fib  # Severe R hip OA  # PMR stable on prednisone 5 mg every day  #Presumed gout flare, recovered  #ESOB      Assessment and Plan:    New Dx of PMR. Severe R hip pain is due to severe OA based on 2020 hip MRI. She prefered to avoid hip arthroplasty in the past but it is quite bothersome and would like to see ortho. Her inflammation markers improved on prednisone, PMR responded to prednisone, now is 5 mg qd.     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 Completed pulmonary rehab in 2019 where she was able to exercise without oxygen. GFR stable.    On HCQ since 2019 with significant improvement of joint pain. No retinal toxicity on eye exam done 2021. Tolerates HCQ well.     Sister Betty recovered from flare of presumed gout (or pseudogout given previous nl SUA) over L foot. She responded to high dose prednisone (40-30-20-10 mg every day each for 3 days) in the past, now is on 5 mg every day.  Given her DM,  osteopenia, highly recommend to start using anakinra prn for gout flares, no flares and has not required it yet.     She preferred in the past not to start daily urate-lowering therapy and her uric acid has been ~6 so allopurinol deferred.     7/8/2022: Sister Betty's major complaint at last visit was ESOB which is improved after Tx of B12 deficiency, iron deficiency due to GIB. 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 Her major complaint today is severe R hip pain due to OA which eventually requires R hip replacement, but she needs to be medically clear for surgery. Discussed pain mx and advised follow up with ortho.      1/13/2023: Stable, no active gout today. B12/iron deficiency anemia has improved. On allopurinol 50 mg po every day.    7/21/2023: On increased allopurinol 100 mg every day since 1/2023, severe polyarticular gout flare last week requiring ER visit and prednisone 40-30-20-10 mg every day, each x 3 days then 5 mg a day, as anakinra daily inj was not enough to control it.    Recovered from gout flare, discussed ongoing m/o gout.    Plan:    Prednisone 5 mg a day    Norco as needed for pain      Cevimeline for dry mouth could be taken 3 times a day (she takes it every other day as does not like to take so many pills)    Stay on fosamax weekly    Yearly eye exam on HCQ    Plaquenil 1 tab a day    For gout flares: take anakinra daily shots x 5-7 days, if not enough, take the prednisone as 40-30-20-10 mg a day, each course x 3 days then go back to 5 mg a day    Return in 6 months (in person)    Labs today    Allopurinol 100 mg a day    Return in person in 6 months    11/3/2023:    Recent gout flare, affecting multiple joints, responded to anakinra well. Will continue anakinra prn. Will re-check SUA with next blood draw and adjust allopurinol as needed. Will refer to endo to address bone health, no improvement of fosamax.    Plan:    Refer to Yvonne Hunt endocrinologist for  osteoporosis management    Labs in 1/2024    Keep 1/2024 appointment with me      Today 2/28/2024:    Doing so well, no recent gout flare to use anakinra or prednisone. Significant improvement of inflammation markers.    Plan:    Prednisone 5 mg a day    Norco as needed for pain      Cevimeline for dry mouth could be taken 3 times a day (she takes it every other day as does not like to take so many pills)    Stay on fosamax weekly, upcoming endocrine appointment to address bone health in 5/2024    Yearly eye exam on HCQ    Plaquenil 1 tab a day    Allopurinol 100 mg every day    Labs q6-12 months, reviewed/stable    For gout flares: take anakinra daily shots x 5-7 days, if not enough, take the prednisone as 40-30-20-10 mg a day, each course x 3 days then go back to 5 mg a day        Return in 6 months in person    Zulma Lopez MD        HPI:   Betty Tee is a 81 year old WF with a history of primary Sjogren's syndrome, PMR, OA who presents for follow-up. She was diagnosed with Sjogren's syndrome in 2015 with borderline +RG, +SSA, and lip biopsy focus score of 1. Associated ILD and joint pain are prednisone-responsive which support the diagnosis.     10/1/2021: No gout flares since last visit so has not needed anakinra. She continues to have R hip pain related to severe OA but does not want to pursue surgery. She recently started pool therapy and began taking CBD oil yesterday. Is hoping to pursue medical marijuana in the future as she does not want to be dependent on norco. Aside from hip pain has otherwise been doing well.        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 She is on prednisone 5 mg a day, she was given prednisone taper recently for possible L foot gout flare which helped. Did not flare again to need using anakinra shots. She has severe R hip pain. Saw ortho, had R hip MRI on 9/5/2020 which showed severe OA, R hip arthroplasty was recommended. She  would prefer to delay R hip arthroplasty, had R hip inj on 2/24, NSAIDs are not allowed with CKD. Norco helps but she does not like to be dependent on it, takes it rarely. No L foot pain today. Her hip/leg pain/back pain is getting better, going to PT, doing exercises at home, last time elbow massage caused pain. Epidural shot helped. Has dry mouth. SOB is stable at baseline.  Last 3 wk has been hard, her doctor took her off gabapentin, sweat/chills and loss of appetite. Reason was being on other meds. Now off gabapentin. Had several gushing bowel explosion, could not make it to the bathroom. Random, unpredictable. No triggers. Has had this problem for years.     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    No gout flares, has not required anakinra, it is in the fridge.    Has breathing issue, ESOB is worse. O2 level is good. Saw PCP, was recomemnded to do follow up with cardiology.    Getting R hip steroid inj, last one was for bursitis. Still hurts crissy today. Saw pain mx yesterday. Got minimal exercises to help moving.    7/8/2022:    Sister Betty presents for follow up. At last visit,w as found to be anemic which explained her SOB. She had iron deficiency and B12 deficiency. Was found to have GIB. Had multiple GI visits, EGD, was treated with cauterization x 3  for GAVE. This AM, had brown stool not black.    SOB is improved.    No gout flares since last visit.    Her major complaint is severe R hip pain, not responding to current pain mx, considers hip arthroplasty, but was told to wait till anemia gets fixed, also has to figure out her heart condition.      1/13/2023:     No gout flare today  Anakinra prn helps with flares  SOB is better  Hip OA pain is the same, considers arthroplasty.      7/21/2023:    Reviewed chart, labs, recent events.    -s/p R hip arthroplasty, recovered well  -Severe polyarticular gout flare last week requiring ER visit and prednisone 40-30-20-10 mg every day, each x 3 days  then 5 mg a day, as anakinra qd inj was not enough to control it.  -better now    11/3/2023: Doing better than last visit, still flares with gout but not as bad, anakinra helps.    Today 2/28/2024: Doing so well, no gout flares, no complaints.    Review of Systems:   A comprehensive ROS was done. Positives are per HPI.      Active Medications:     Outpatient Medications Prior to Visit   Medication Sig Dispense Refill    acyclovir (ZOVIRAX) 400 MG tablet Take 1 tablet (400 mg) by mouth every 8 hours For a couple days 15 tablet 2    acyclovir (ZOVIRAX) 5 % external ointment Apply topically as needed (6 times day PRN for outbreaks) As needed for outbreaks 15 g 3    albuterol (PROAIR HFA/PROVENTIL HFA/VENTOLIN HFA) 108 (90 Base) MCG/ACT inhaler Inhale 2 puffs into the lungs every 6 hours as needed for wheezing or shortness of breath      alendronate (FOSAMAX) 70 MG tablet Take 1 tablet (70 mg) by mouth every 7 days 12 tablet 1    allopurinol (ZYLOPRIM) 100 MG tablet Take 1 tablet (100 mg) by mouth daily 90 tablet 1    anakinra (KINERET) 100 MG/0.67ML SOSY injection Inject 0.67 mLs (100 mg) Subcutaneous daily 20.1 mL 3    aspirin (ASA) 81 MG EC tablet Take 1 tablet (81 mg) by mouth 2 times daily 60 tablet 0    atorvastatin (LIPITOR) 10 MG tablet TAKE 1/2 TABLET BY MOUTH EVERY DAY 45 tablet 3    azithromycin (ZITHROMAX) 250 MG tablet TAKE 1 TABLET BY MOUTH EVERY DAY 30 tablet 5    BD PEN NEEDLE JANE 2ND GEN 32G X 4 MM miscellaneous USE TO TEST 4 TIMES A  each 1    blood glucose (ACCU-CHEK SHANNON PLUS) test strip USE TO TEST BLOOD SUGARS 3 TIMES DAILY 300 strip 0    blood glucose (NO BRAND SPECIFIED) lancets standard Use to test blood sugar 3 times daily or as directed 100 Lancet 3    BREO ELLIPTA 100-25 MCG/ACT inhaler TAKE 1 PUFF BY MOUTH EVERY DAY 1 each 11    cevimeline (EVOXAC) 30 MG capsule Take 1 capsule (30 mg) by mouth 3 times daily as needed      Cholecalciferol (VITAMIN D3) 50 MCG (2000 UT) CAPS TAKE 100  MCG BY MOUTH DAILY (TAKE 2 TABLET (50 MCG) BY MOUTH DAILY - ORAL) 180 capsule 0    COMPOUNDED NON-CONTROLLED SUBSTANCE (CMPD RX) - PHARMACY TO MIX COMPOUNDED MEDICATION Estriol 1 mg/g Apply small amount to finger and apply to inside vagina daily for 2 weeks then twice weekly Route: vaginally Dispense 30 grams 11 refills 30 g 11    cyanocobalamin (VITAMIN B-12) 1000 MCG tablet Take 1 tablet (1,000 mcg) by mouth three times a week      escitalopram (LEXAPRO) 20 MG tablet TAKE 1 TABLET BY MOUTH EVERY DAY 90 tablet 1    ferrous gluconate (FERGON) 324 (38 Fe) MG tablet Take 1 tablet (324 mg) by mouth three times a week      fluticasone (FLONASE) 50 MCG/ACT nasal spray SPRAY 1-2 SPRAYS INTO BOTH NOSTRILS DAILY AS NEEDED FOR ALLERGIES 16 mL 11    hydroxychloroquine (PLAQUENIL) 200 MG tablet Take 1 tablet (200 mg) by mouth daily Get annual eye exams for hydroxychloroquine (Plaquenil) monitoring and fax to [phone] 90 tablet 1    insulin glargine (LANTUS PEN) 100 UNIT/ML pen Inject 25 Units Subcutaneous at bedtime 15 mL 1    insulin lispro (HUMALOG KWIKPEN) 100 UNIT/ML (1 unit dial) KWIKPEN Inject Subcutaneous 3 times daily (before meals) Sliding scale insulin; tests BG three times day  If BG <150, no insulin given   If -200 take 2 units  If -250 take 4 units  If -300 take 6 units  If -350 take 10 units  If BG >350 take 14 units      ketoconazole (NIZORAL) 2 % external cream Apply topically 2 times daily as needed for itching 60 g 1    levocetirizine (XYZAL) 5 MG tablet Take 1 tablet (5 mg) by mouth every evening 90 tablet 1    lidocaine (LIDODERM) 5 % patch APPLY PATCH TO PAINFUL AREA FOR UP TO 12 H WITHIN A 24 H PERIOD. REMOVE AFTER 12 HOURS. (Patient taking differently: Apply patch to painful area for up to 12 h within a 24 h period.  Remove after 12 hours.) 30 patch 2    loratadine (CLARITIN) 10 MG tablet TAKE 1 TABLET BY MOUTH EVERY DAY 90 tablet 3    methocarbamol (ROBAXIN) 750 MG tablet  Take 1 tablet (750 mg) by mouth 3 times daily as needed for muscle spasms 90 tablet 3    metoprolol succinate ER (TOPROL XL) 50 MG 24 hr tablet Take 1 tablet (50 mg) by mouth 2 times daily 180 tablet 3    mirabegron (MYRBETRIQ) 25 MG 24 hr tablet Take 1 tablet (25 mg) by mouth daily 30 tablet 11    montelukast (SINGULAIR) 10 MG tablet TAKE 1 TABLET BY MOUTH EVERYDAY AT BEDTIME 90 tablet 0    pantoprazole (PROTONIX) 40 MG EC tablet Take 1 tablet (40 mg) by mouth daily (replaces famotidine- should stop taking famotidine) 90 tablet 3    polyethylene glycol (MIRALAX) 17 g packet Take 17 g by mouth daily 10 packet 0    potassium chloride ER (KLOR-CON) 20 MEQ CR tablet Take 2 tablets (40 mEq) by mouth every morning AND 1 tablet (20 mEq) every evening. 270 tablet 3    predniSONE (DELTASONE) 10 MG tablet For gout flares, take the prednisone as 40-30-20-10 mg a day, each course x 3 days then go back to 5 mg a day 30 tablet 3    predniSONE (DELTASONE) 5 MG tablet Take 1 tablet (5 mg) by mouth daily 90 tablet 1    Semaglutide, 2 MG/DOSE, (OZEMPIC) 8 MG/3ML pen Inject 2 mg Subcutaneous every 7 days 9 mL 3    torsemide (DEMADEX) 20 MG tablet Take 2 tablets (40 mg) by mouth every morning AND 1 tablet (20 mg) every evening. 270 tablet 3    traZODone (DESYREL) 50 MG tablet Take 3 tablets (150 mg) by mouth at bedtime 90 tablet 1    acetaminophen (TYLENOL) 500 MG tablet Take 2 tablets (1,000 mg) by mouth every 8 hours as needed (max 6 tablets/24 hours, 2 tablets/dose) (Patient not taking: Reported on 1/30/2024) 60 tablet 0     No facility-administered medications prior to visit.     Allergies:   Allergies   Allergen Reactions    Amoxicillin-Pot Clavulanate Nausea and Vomiting    Amoxicillin-Pot Clavulanate     Codeine Nausea and Vomiting     PN: LW Reaction: HIVES    Penicillins Nausea and Vomiting     PN: LW Reaction: GI Upset    Phenobarbital Itching    Seasonal Allergies         Past Medical History:  Alcohol abuse, in remission.  "  Allergic rhinitis.   Antiplatelet long-term use.   Atrial fibrillation.   Cardiomegaly.   Osteopenia.   Diverticulosis.   Benign essential hypertension.   GERD.   Insomnia.   Irregular heart beat.   Lumbago.   Major depressive disorder, recurrent episode, moderate.   ANGELICA.  Osteoarthrosis.   Sjogren's syndrome.   Sleep apnea.   Tobacco use disorder.   TMJ disorder.   RLS.  Major depressive disorder.   Hypertension.   Sciatica.   Colouterine fistula.   Left ventricular hypertrophy.   Breat fibroadenoma.   DVT.   Fatty liver disease, nonalcoholic.   Rib pain.   Neck mass.   Interstitial lung disease.   Acute and chronic respiratory failure with hypoxia.   Type II diabetes mellitus.   Hyperlipidemia.   Inflammatory arthritis.      Past Surgical History:  Back surgery.   Left breast biopsy.   Appendectomy.   Exploratory laparotomy.   Cardiac surgery.   Cholecystectomy.   Left colectomy.   Total abdominal hysterectomy with bilateral salpingo-oophorectomy.   Insert ureter stent.   Flexible sigmoidoscopy.   Ileostomy takedown.     Family History:   Mother: Positive for CAD, heart disease, hypertension, cerebrovascular disease, hyperlipidemia.   Father: Positive for alcohol/drug abuse, Alzheimer disease, dementia, hypertension, hyperlipidemia.   Sister: Positive for CAD, hypertension, and colorectal cancer.   Sister: Positive for hypertension and hyperlipidemia.   Sister: Positive for diabetes, lung cancer, asthma, and heart disease.   Brother: Positive for Parkinsonism and substance abuse.   Brother: Positive for hypertension, diverticulitis, and prostate cancer.   Daughter: Positive for breast cancer.        Social History:   She is a former smoker; quit in 2011. She has a history of alcohol use but stopped drinking in 1986.      Physical Exam:     BP (!) 151/87 (BP Location: Right arm, Patient Position: Sitting, Cuff Size: Adult Large)   Pulse 64   Ht 1.702 m (5' 7\")   Wt 84.4 kg (186 lb)   LMP  (LMP Unknown)   " SpO2 96%   BMI 29.13 kg/m      Constitutional: NAD, very pleasant, sister Nghia present   Eyes: Normal EOM, conjunctiva, sclera  Chest: CTAB  CV: no M/R/G, RRR  Abdomen: soft, NT  MSK: no active synovitis or joint tenderness. Able to get out of chair and ambulate with minimal help from the cane  Skin: No rash  Neuro: grossly non-focal  Psych: Normal affect.    Images:  CT Chest w/o contrast (7/25/18):  Findings remain most consistent with small airway disease  and/or nonclassifiable interstitial lung disease and are not  significantly changed from the March 2017 comparison.    Per radiology.    Laboratory:       Latest Ref Rng & Units 9/7/2023    12:39 PM 10/11/2023     2:25 PM 1/16/2024     2:30 PM   RHEUM RESULTS   Albumin 3.5 - 5.2 g/dL   4.2    ALT 0 - 50 U/L   10    AST 0 - 45 U/L   18    Complement C3 81 - 157 mg/dL   134    Complement C4 13 - 39 mg/dL   33    Creatinine 0.51 - 0.95 mg/dL 0.91  0.86  0.89    CRP Inflammation <5.00 mg/L   6.83    GFR Estimate >60 mL/min/1.73m2 62  67  64    Hematocrit 35.0 - 47.0 % 39.6  38.9  42.1    Hemoglobin 11.7 - 15.7 g/dL 13.0  12.7  13.6    WBC 4.0 - 11.0 10e3/uL 11.7  6.9  9.5    RBC Count 3.80 - 5.20 10e6/uL 4.40  4.29  4.61    RDW 10.0 - 15.0 % 14.1  14.5  14.0    MCHC 31.5 - 36.5 g/dL 32.8  32.6  32.3    MCV 78 - 100 fL 90  91  91    Platelet Count 150 - 450 10e3/uL 227  177  210    Sed Rate 0 - 30 mm/hr   20        Rheumatoid Factor   Date Value Ref Range Status   07/24/2015 <20 <20 IU/mL Final   ,  ,   Cyclic Cit Pept IgG/IgA   Date Value Ref Range Status   07/24/2015 <20  Interpretation:  Negative   <20 UNITS Final   ,  ,   Scleroderma Antibody Scl-70 CECILIA IgG   Date Value Ref Range Status   07/24/2015  0.0 - 0.9 AI Final    <0.2  Negative   Antibody index (AI) values reflect qualitative changes in antibody   concentration that cannot be directly associated with clinical condition or   disease state.       SSA (Ro) (CECILIA) Antibody, IgG   Date Value Ref Range Status    07/24/2015 1.6 (H) 0.0 - 0.9 AI Final     Comment:     Positive   Antibody index (AI) values reflect qualitative changes in antibody   concentration that cannot be directly associated with clinical condition or   disease state.       SSB (La) (CECILIA) Antibody, IgG   Date Value Ref Range Status   07/24/2015  0.0 - 0.9 AI Final    <0.2  Negative   Antibody index (AI) values reflect qualitative changes in antibody   concentration that cannot be directly associated with clinical condition or   disease state.       ,  ,   RG Screen by EIA   Date Value Ref Range Status   07/24/2015 <1.0  Interpretation:  Negative   <1.0 Final   ,  ,  ,  ,  ,  ,  ,  ,  ,  ,  ,  ,  ,  ,  ,  ,  ,  ,   Neutrophil Cytoplasmic IgG Antibody   Date Value Ref Range Status   07/24/2015   Final    <1:20  Reference range: <1:20  (Note)  The ANCA IFA is <1:20; therefore, no further testing will  be performed.  INTERPRETIVE INFORMATION: Anti-Neutrophil Cyto Ab, IgG  Neutrophil Cytoplasmic Antibodies (C-ANCA = granular  cytoplasmic staining, P-ANCA = perinuclear staining) are  found in the serum of over 90 percent of patients with  certain necrotizing systemic vasculitides, and usually in  less than 5 percent of patients with collagen vascular  disease or arthritis.  Performed by Where,  [address] [place] [id number] [phone]  [email or web address], Burke Mckeon MD, Lab. Director       ,  ,  ,  ,  ,  ,  ,   IGG   Date Value Ref Range Status   07/24/2015 1320 695 - 1620 mg/dL Final   ,  ,  ,  ,  ,   Scleroderma Antibody Scl-70 CECILIA IgG   Date Value Ref Range Status   07/24/2015  0.0 - 0.9 AI Final    <0.2  Negative   Antibody index (AI) values reflect qualitative changes in antibody   concentration that cannot be directly associated with clinical condition or   disease state.          CBC RESULTS: 6/4/2021   Lab Test 06/04/21  1422   WBC 8.6   RBC 4.46   HGB 13.4   HCT 42.1   MCV 94   MCH 30.0   MCHC 31.8   RDW 15.5*        Last  Comprehensive Metabolic Panel:  Sodium   Date Value Ref Range Status   10/11/2023 135 135 - 145 mmol/L Final     Comment:     Reference intervals for this test were updated on 09/26/2023 to more accurately reflect our healthy population. There may be differences in the flagging of prior results with similar values performed with this method. Interpretation of those prior results can be made in the context of the updated reference intervals.    06/04/2021 137 133 - 144 mmol/L Final     Potassium   Date Value Ref Range Status   10/11/2023 3.8 3.4 - 5.3 mmol/L Final   08/23/2022 4.1 3.4 - 5.3 mmol/L Final   06/04/2021 4.0 3.4 - 5.3 mmol/L Final     Chloride   Date Value Ref Range Status   10/11/2023 98 98 - 107 mmol/L Final   08/23/2022 107 94 - 109 mmol/L Final   06/04/2021 106 94 - 109 mmol/L Final     Carbon Dioxide   Date Value Ref Range Status   06/04/2021 25 20 - 32 mmol/L Final     Carbon Dioxide (CO2)   Date Value Ref Range Status   10/11/2023 28 22 - 29 mmol/L Final   08/23/2022 22 20 - 32 mmol/L Final     Anion Gap   Date Value Ref Range Status   10/11/2023 9 7 - 15 mmol/L Final   08/23/2022 8 3 - 14 mmol/L Final   06/04/2021 6 3 - 14 mmol/L Final     Glucose   Date Value Ref Range Status   10/11/2023 208 (H) 70 - 99 mg/dL Final   08/23/2022 198 (H) 70 - 99 mg/dL Final   06/04/2021 142 (H) 70 - 99 mg/dL Final     GLUCOSE BY METER POCT   Date Value Ref Range Status   06/05/2023 259 (H) 70 - 99 mg/dL Final     Urea Nitrogen   Date Value Ref Range Status   10/11/2023 9.9 8.0 - 23.0 mg/dL Final   08/23/2022 17 7 - 30 mg/dL Final   06/04/2021 14 7 - 30 mg/dL Final     Creatinine   Date Value Ref Range Status   01/16/2024 0.89 0.51 - 0.95 mg/dL Final   06/04/2021 1.11 (H) 0.52 - 1.04 mg/dL Final     GFR Estimate   Date Value Ref Range Status   01/16/2024 64 >60 mL/min/1.73m2 Final   06/04/2021 47 (L) >60 mL/min/[1.73_m2] Final     Comment:     Non  GFR Calc  Starting 12/18/2018, serum creatinine  based estimated GFR (eGFR) will be   calculated using the Chronic Kidney Disease Epidemiology Collaboration   (CKD-EPI) equation.       Calcium   Date Value Ref Range Status   10/11/2023 8.8 8.8 - 10.2 mg/dL Final   06/04/2021 8.8 8.5 - 10.1 mg/dL Final       ESR 6/4/2021: 10.0    CRP 6/4/2021: 3.0    Uric acid 6/4/2021: 6.2    Component      Latest Ref Rng & Units 2/11/2022   WBC      4.0 - 11.0 10e3/uL 11.5 (H)   RBC Count      3.80 - 5.20 10e6/uL 3.47 (L)   Hemoglobin      11.7 - 15.7 g/dL 9.1 (L)   Hematocrit      35.0 - 47.0 % 30.2 (L)   MCV      78 - 100 fL 87   MCH      26.5 - 33.0 pg 26.2 (L)   MCHC      31.5 - 36.5 g/dL 30.1 (L)   RDW      10.0 - 15.0 % 15.1 (H)   Platelet Count      150 - 450 10e3/uL 283   % Neutrophils      % 76   % Lymphocytes      % 9   % Monocytes      % 11   % Eosinophils      % 2   % Basophils      % 1   % Immature Granulocytes      % 1   NRBCs per 100 WBC      <1 /100 0   Absolute Neutrophils      1.6 - 8.3 10e3/uL 8.9 (H)   Absolute Lymphocytes      0.8 - 5.3 10e3/uL 1.0   Absolute Monocytes      0.0 - 1.3 10e3/uL 1.2   Absolute Eosinophils      0.0 - 0.7 10e3/uL 0.2   Absolute Basophils      0.0 - 0.2 10e3/uL 0.1   Absolute Immature Granulocytes      <=0.4 10e3/uL 0.1   Absolute NRBCs      10e3/uL 0.0   Albumin Fraction      3.7 - 5.1 g/dL 3.9   Alpha 1 Fraction      0.2 - 0.4 g/dL 0.4   Alpha 2 Fraction      0.5 - 0.9 g/dL 0.8   Beta Fraction      0.6 - 1.0 g/dL 1.0   Gamma Fraction      0.7 - 1.6 g/dL 0.8   Monoclonal Peak      <=0.0 g/dL 0.0   ELP Interpretation:       Essentially normal electrophoretic pattern. No obvious monoclonal proteins seen. Pathologic significance requires clinical correlation. Meghan Brothers M.D., Ph.D.   Iron      35 - 180 ug/dL 17 (L)   Iron Binding Cap      240 - 430 ug/dL 354   Iron Saturation Index      15 - 46 % 5 (L)   Creatinine      0.52 - 1.04 mg/dL 1.00   GFR Estimate      >60 mL/min/1.73m2 56 (L)   % Retic      0.5 - 2.0 % 2.7 (H)   Absolute  Retic      0.025 - 0.095 10e6/uL 0.090   ALT      0 - 50 U/L 17   Albumin      3.4 - 5.0 g/dL 3.3 (L)   AST      0 - 45 U/L 15   Sed Rate      0 - 30 mm/hr 36 (H)   CRP Inflammation      0.0 - 8.0 mg/L 12.5 (H)   Uric Acid      2.6 - 6.0 mg/dL 6.1 (H)   Immunofixation ELP       No monoclonal protein seen on immunofixation. Pathologic significance requires clinical correlation.  Meghan Brothers M.D., Ph.D.   Immunofix ELP Urine       No monoclonal protein seen on immunofixation. Pathologic significance requires clinical correlation.  Meghan Brothers M.D., Ph.D.   Total Protein Serum for ELP      6.8 - 8.8 g/dL 6.9   N-Terminal Pro Bnp      0 - 450 pg/mL 239   Haptoglobin      32 - 197 mg/dL 149   Vitamin B12      193 - 986 pg/mL 162 (L)   Ferritin      8 - 252 ng/mL 14           Again, thank you for allowing me to participate in the care of your patient.      Sincerely,    Zulma Lopez MD

## 2024-03-01 DIAGNOSIS — I50.30 (HFPEF) HEART FAILURE WITH PRESERVED EJECTION FRACTION (H): Primary | ICD-10-CM

## 2024-03-03 DIAGNOSIS — K21.9 GASTROESOPHAGEAL REFLUX DISEASE WITHOUT ESOPHAGITIS: ICD-10-CM

## 2024-03-03 DIAGNOSIS — Z87.19 HISTORY OF LOWER GI BLEEDING: ICD-10-CM

## 2024-03-04 DIAGNOSIS — G47.00 INSOMNIA, UNSPECIFIED TYPE: ICD-10-CM

## 2024-03-05 RX ORDER — TRAZODONE HYDROCHLORIDE 50 MG/1
150 TABLET, FILM COATED ORAL AT BEDTIME
Qty: 90 TABLET | Refills: 9 | Status: SHIPPED | OUTPATIENT
Start: 2024-03-05

## 2024-03-06 ENCOUNTER — VIRTUAL VISIT (OUTPATIENT)
Dept: UROLOGY | Facility: CLINIC | Age: 84
End: 2024-03-06
Payer: COMMERCIAL

## 2024-03-06 ENCOUNTER — TELEPHONE (OUTPATIENT)
Dept: UROLOGY | Facility: CLINIC | Age: 84
End: 2024-03-06

## 2024-03-06 DIAGNOSIS — N39.41 URGE INCONTINENCE: ICD-10-CM

## 2024-03-06 DIAGNOSIS — N95.8 GENITOURINARY SYNDROME OF MENOPAUSE: ICD-10-CM

## 2024-03-06 DIAGNOSIS — N36.42 INTRINSIC SPHINCTER DEFICIENCY: Primary | ICD-10-CM

## 2024-03-06 PROCEDURE — 99213 OFFICE O/P EST LOW 20 MIN: CPT | Mod: 95 | Performed by: UROLOGY

## 2024-03-06 RX ORDER — PANTOPRAZOLE SODIUM 40 MG/1
40 TABLET, DELAYED RELEASE ORAL DAILY
Qty: 90 TABLET | Refills: 0 | Status: SHIPPED | OUTPATIENT
Start: 2024-03-06 | End: 2024-06-11

## 2024-03-06 NOTE — TELEPHONE ENCOUNTER
pantoprazole (PROTONIX) 40 MG EC tablet   90 tablet 3 1/25/2023       Last Office Visit : 10-6-2022  Future Office visit:  3-7-2024    PPI Protocol [id number] 08:27 AM   Protocol Details Recent (12 mo) or future (90 days) visit within the authorizing provider's specialty     Sowmya refill

## 2024-03-06 NOTE — PROGRESS NOTES
Reason for Visit:  Follow up urinary urgency/incontinence    Clinical Data:  Ms. Tee is a 83 year old female with urinary urgency and incontinence. She was started on estrogen cream and has been using that.  She has not tried any medication because she was trying more conservative measures.  She feels that the Myrbetriq is helping and has had less incontinence when she gets up from a sitting position she starts to leak as well.  She is pleased with this control for now.    Assessment & Plan: Betty Tee is a 83 year old female with a history of urinary urgency and urge incontinence as well. Her symptoms are improved with the myrbetriq but sometimes has a little difficulty starting her stream.  We discussed periurethral bulking for some ISD, but hesitant to do that if any difficulty.  She is pleased with her symptoms for now.  -continue Myrbetriq 25 mg (pt. Will monitor her bp)  -continue estrogen cream for GUSM  -follow up in a year, sooner if she would like to consider further intervention.      Thank you for allowing me to participate in the care of  Ms. Betty Tee and I will keep you updated on her progress.    Vale Nassar MD

## 2024-03-06 NOTE — LETTER
3/6/2024       RE: Betty Tee  [address]  [place] [address]     Dear Colleague,    Thank you for referring your patient, Betty Tee, to the [facility] [place] at [facility]. Please see a copy of my visit note below.    Virtual Visit Details    Type of service:  Video Visit   Video Start Time: 11:22 AM  Video End Time:11:29 AM    Originating Location (pt. Location): Home    Distant Location (provider location):  Off-site  Platform used for Video Visit: Thad    Reason for Visit:  Follow up urinary urgency/incontinence    Clinical Data:  Ms. Tee is a 83 year old female with urinary urgency and incontinence. She was started on estrogen cream and has been using that.  She has not tried any medication because she was trying more conservative measures.  She feels that the Myrbetriq is helping and has had less incontinence when she gets up from a sitting position she starts to leak as well.  She is pleased with this control for now.    Assessment & Plan: Betty Tee is a 83 year old female with a history of urinary urgency and urge incontinence as well. Her symptoms are improved with the myrbetriq but sometimes has a little difficulty starting her stream.  We discussed periurethral bulking for some ISD, but hesitant to do that if any difficulty.  She is pleased with her symptoms for now.  -continue Myrbetriq 25 mg (pt. Will monitor her bp)  -continue estrogen cream for GUSM  -follow up in a year, sooner if she would like to consider further intervention.      Thank you for allowing me to participate in the care of  Ms. Betty Tee and I will keep you updated on her progress.    Vale Nassar MD

## 2024-03-06 NOTE — PROGRESS NOTES
Virtual Visit Details    Type of service:  Video Visit   Video Start Time: 11:22 AM  Video End Time:11:29 AM    Originating Location (pt. Location): Home    Distant Location (provider location):  Off-site  Platform used for Video Visit: Thad

## 2024-03-06 NOTE — PATIENT INSTRUCTIONS
-continue Myrbetriq 25 mg (pt. Will monitor her bp)  -continue estrogen cream  -follow up in a year, sooner if she would like to consider further intervention.

## 2024-03-06 NOTE — NURSING NOTE
Is the patient currently in the state of [place]? YES    Visit mode:VIDEO    If the visit is dropped, the patient can be reconnected by: VIDEO VISIT: Text to cell phone:   Telephone Information:   Mobile [phone]       Will anyone else be joining the visit? NO  (If patient encounters technical issues they should call [phone]    How would you like to obtain your AVS? MyChart    Are changes needed to the allergy or medication list? No, Pt stated no changes to allergies, and Pt stated no med changes    Reason for visit: YUNIOR DE LOS SANTOS

## 2024-03-07 ENCOUNTER — OFFICE VISIT (OUTPATIENT)
Dept: CARDIOLOGY | Facility: CLINIC | Age: 84
End: 2024-03-07
Attending: INTERNAL MEDICINE
Payer: COMMERCIAL

## 2024-03-07 ENCOUNTER — TELEPHONE (OUTPATIENT)
Dept: CARDIOLOGY | Facility: CLINIC | Age: 84
End: 2024-03-07

## 2024-03-07 ENCOUNTER — LAB (OUTPATIENT)
Dept: LAB | Facility: CLINIC | Age: 84
End: 2024-03-07
Payer: COMMERCIAL

## 2024-03-07 VITALS
BODY MASS INDEX: 28.96 KG/M2 | OXYGEN SATURATION: 98 % | WEIGHT: 184.9 LBS | HEART RATE: 60 BPM | DIASTOLIC BLOOD PRESSURE: 88 MMHG | SYSTOLIC BLOOD PRESSURE: 167 MMHG

## 2024-03-07 DIAGNOSIS — I50.32 CHRONIC HEART FAILURE WITH PRESERVED EJECTION FRACTION (H): ICD-10-CM

## 2024-03-07 DIAGNOSIS — I50.30 HEART FAILURE WITH PRESERVED EJECTION FRACTION, NYHA CLASS I (H): Primary | ICD-10-CM

## 2024-03-07 DIAGNOSIS — I50.30 (HFPEF) HEART FAILURE WITH PRESERVED EJECTION FRACTION (H): ICD-10-CM

## 2024-03-07 LAB
ANION GAP SERPL CALCULATED.3IONS-SCNC: 11 MMOL/L (ref 7–15)
BUN SERPL-MCNC: 17 MG/DL (ref 8–23)
CALCIUM SERPL-MCNC: 9.3 MG/DL (ref 8.8–10.2)
CHLORIDE SERPL-SCNC: 104 MMOL/L (ref 98–107)
CREAT SERPL-MCNC: 0.95 MG/DL (ref 0.51–0.95)
DEPRECATED HCO3 PLAS-SCNC: 24 MMOL/L (ref 22–29)
EGFRCR SERPLBLD CKD-EPI 2021: 59 ML/MIN/1.73M2
GLUCOSE SERPL-MCNC: 270 MG/DL (ref 70–99)
NT-PROBNP SERPL-MCNC: 487 PG/ML (ref 0–1800)
POTASSIUM SERPL-SCNC: 3.8 MMOL/L (ref 3.4–5.3)
SODIUM SERPL-SCNC: 139 MMOL/L (ref 135–145)

## 2024-03-07 PROCEDURE — 99214 OFFICE O/P EST MOD 30 MIN: CPT | Performed by: INTERNAL MEDICINE

## 2024-03-07 PROCEDURE — 83880 ASSAY OF NATRIURETIC PEPTIDE: CPT | Performed by: PATHOLOGY

## 2024-03-07 PROCEDURE — G0463 HOSPITAL OUTPT CLINIC VISIT: HCPCS | Performed by: INTERNAL MEDICINE

## 2024-03-07 PROCEDURE — 80048 BASIC METABOLIC PNL TOTAL CA: CPT | Performed by: PATHOLOGY

## 2024-03-07 PROCEDURE — 36415 COLL VENOUS BLD VENIPUNCTURE: CPT | Performed by: PATHOLOGY

## 2024-03-07 ASSESSMENT — PAIN SCALES - GENERAL: PAINLEVEL: NO PAIN (0)

## 2024-03-07 NOTE — NURSING NOTE
Diet: Patient instructed regarding a heart failure healthy diet, including discussion of reduced fat and 2000 mg daily sodium restriction, daily weights, medication purpose and compliance, fluid restrictions and resources for patient and family to access for assistance with heart failure management.       Labs: Patient wwas given results of the laboratory testing obtained today and patient was instructed about when to return for the next laboratory testing.     Med Reconcile: Reviewed and verified all current medications with the patient. The updated medication list was printed and given to the patient.     Med changes: none but call if BPs over 140's consistently     Return Appointment: Patient given instructions regarding scheduling next clinic visit: Follow up in 6 months with labs and echo     Patient stated she understood all health information given and agreed to call with further questions or concerns.     Flor Velasquez RN

## 2024-03-07 NOTE — NURSING NOTE
Chief Complaint   Patient presents with    Follow Up     RHF, Labs prior     Vitals were taken and medications reconciled.    Juan Diego Orta, EMT  11:32 AM

## 2024-03-07 NOTE — PROGRESS NOTES
Optimal Vascular Metrics    Blood Pressure - rechecking in the coming weeks     On Aspirin  Yes    On Statin  Yes    Tobacco use  No

## 2024-03-07 NOTE — PATIENT INSTRUCTIONS
"Cardiology Providers you saw during your visit:  Dr. Rosa    Medication changes:   No changes- but call if BPs are consistently above 140    Follow up:  Follow up in 6 months with labs and echo      Please call if you have :  1. Weight gain of more than 2 pounds in a day or 5 pounds in a week  2. Increased shortness of breath, swelling or bloating  3. Dizziness, lightheadedness   4. Any questions or concerns.       Follow the American Heart Association Diet and Lifestyle recommendations:  Limit saturated fat, trans fat, sodium, red meat, sweets and sugar-sweetened beverages. If you choose to eat red meat, compare labels and select the leanest cuts available.  Aim for at least 150 minutes of moderate physical activity or 75 minutes of vigorous physical activity - or an equal combination of both - each week.      During business hours: [phone], press option # 1 to schedule and leave a message for your care team.        After hours, weekends or holidays: On Call Cardiologist- [phone]   option #4 and ask to speak to the on-call Cardiologist. Inform them you are a CORE/heart failure patient at the [facility].      Heart Failure Support Group     dates:    Monday, , 1-2pm     Monday,  , 1-2pm     Monday,  , 1-2pm     Monday, , 1-2pm     Monday, May 6th, 1-2pm     Monday, , 1-2pm     Monday, , 1-2pm     Monday, , 1-2pm     Monday, , 1-2pm     Monday, , 1-2pm     Monday, , 1-2pm     Monday, , 1-2pm     Flor Velasquez RN BSN CHFN  Cardiology Care Coordinator - Heart Failure/ [facility]  [facility]   Questions and [phone]   First press #1 for the [employer] and \"To send a message to your care team\"    "

## 2024-03-07 NOTE — LETTER
3/7/2024      RE: Betty Tee  [address]  [place] [facility] [address]       Dear Colleague,    Thank you for the opportunity to participate in the care of your patient, Betty Tee, at the [facility] [place] at [facility]. Please see a copy of my visit note below.      March 7, 2024    Follow up HFpEF    HPI:   Patient has interstitial lung disease (moderate restriction), Sjogren's syndrome, HTN, atrial fibrillation, diverticulitis s/p colectomy 2011 who I am following for HFpEF. She is here for routine HFpEF follow up.     Recent events:   COVID-19 in March 2023. Then had hip surgery - lost a lot of strength with this.    When weight was low after all of this - her BP was low too and aldactone was stopped   Now recovered and gained the weight back.     She had a watchman placed for GI bleeding on anticoagulation   She does have paroxysmal atrial fibrillation she is only on baby aspirin       This visit:   No lower extremity edema  No PND orthopnea  She is more limited from her musculoskeletal issues than her breathing presently  Able to walk into the clinic without stopping   Blood pressures have been slightly elevated at the last few clinic visits but she is also stressed due to moving   She does wear her CPAP mask routinely.   No chest pain   No palpitations   Recently started on ozempic   She presently denies dark stools- CBC recently stable       PAST MEDICAL HISTORY:  Past Medical History:   Diagnosis Date    Alcohol abuse, in remission     Allergic rhinitis, cause unspecified     allegra helps when she takes it    Antiplatelet or antithrombotic long-term use     Atrial fibrillation (H)     in hosp in 11/11 after surgery w/ fluid overload    Cardiomegaly     LVH on stress echo- cardiac w/u at [facility] ER- neg CT scan for PE, neg stress echo in 8/06    Chest pain, unspecified     Congestive heart failure (H)     Depressive disorder      Diabetes (H)     Disorder of bone and cartilage, unspecified     osteopenia (had been on prempro), improved on 6/06 dexa, stable dexa 11/10    Diverticulosis of colon (without mention of hemorrhage)     last episode yrs ago    Essential hypertension, benign     Follicular bronchiolitis (H)     associated with Sjogrens, dx by chest CT showing mosaic attenuation and air trapping    Gastro-oesophageal reflux disease     ILD (interstitial lung disease) (H)     associated with Sjogrens, also has mildly elevated IgG4, first noted on chest CT 2015 (mild changes) and also has small airways disease; ILD improved on follow up chest CT 2018.    Insomnia, unspecified     weaned off clonazepam    Irregular heart beat     Lumbago 07/2009    MRI with DJD, now seeing Dr. Cain for sciatic sx's    Major depressive disorder, recurrent episode, moderate (H)     Obstructive sleep apnea     uses cpap    Osteoarthrosis, unspecified whether generalized or localized, unspecified site     Sjogren's syndrome (H24)     + RG and SSA and lip bx    Sleep apnea     uses a cpap machine    Tobacco use disorder     chantix in 9/07, started again in 6/08, working     FAMILY HISTORY:  Mother had MI and CHF in her 60's. 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  Moving to assisted living this week   Illness of friends     CURRENT MEDICATIONS:    Current Outpatient Medications   Medication Sig Dispense Refill    acetaminophen (TYLENOL) 500 MG tablet Take 2 tablets (1,000 mg) by mouth every 8 hours as needed (max 6 tablets/24 hours, 2 tablets/dose) (Patient not taking: Reported on 1/30/2024) 60 tablet 0    acyclovir (ZOVIRAX) 400 MG tablet Take 1 tablet (400 mg) by mouth every 8 hours For a couple days 15 tablet 2    acyclovir (ZOVIRAX) 5 % external ointment Apply topically as needed (6 times day PRN for outbreaks) As needed for outbreaks 15 g 3    albuterol  (PROAIR HFA/PROVENTIL HFA/VENTOLIN HFA) 108 (90 Base) MCG/ACT inhaler Inhale 2 puffs into the lungs every 6 hours as needed for wheezing or shortness of breath      alendronate (FOSAMAX) 70 MG tablet Take 1 tablet (70 mg) by mouth every 7 days 12 tablet 1    allopurinol (ZYLOPRIM) 100 MG tablet Take 1 tablet (100 mg) by mouth daily 90 tablet 1    anakinra (KINERET) 100 MG/0.67ML SOSY injection Inject 0.67 mLs (100 mg) Subcutaneous daily 20.1 mL 3    aspirin (ASA) 81 MG EC tablet Take 1 tablet (81 mg) by mouth 2 times daily 60 tablet 0    atorvastatin (LIPITOR) 10 MG tablet TAKE 1/2 TABLET BY MOUTH EVERY DAY 45 tablet 3    azithromycin (ZITHROMAX) 250 MG tablet TAKE 1 TABLET BY MOUTH EVERY DAY 30 tablet 5    BD PEN NEEDLE JANE 2ND GEN 32G X 4 MM miscellaneous USE TO TEST 4 TIMES A  each 1    blood glucose (ACCU-CHEK SHANNON PLUS) test strip USE TO TEST BLOOD SUGARS 3 TIMES DAILY 300 strip 0    blood glucose (NO BRAND SPECIFIED) lancets standard Use to test blood sugar 3 times daily or as directed 100 Lancet 3    BREO ELLIPTA 100-25 MCG/ACT inhaler TAKE 1 PUFF BY MOUTH EVERY DAY 1 each 11    cevimeline (EVOXAC) 30 MG capsule Take 1 capsule (30 mg) by mouth 3 times daily as needed      Cholecalciferol (VITAMIN D3) 50 MCG (2000 UT) CAPS TAKE 100 MCG BY MOUTH DAILY (TAKE 2 TABLET (50 MCG) BY MOUTH DAILY - ORAL) 180 capsule 0    COMPOUNDED NON-CONTROLLED SUBSTANCE (CMPD RX) - PHARMACY TO MIX COMPOUNDED MEDICATION Estriol 1 mg/g Apply small amount to finger and apply to inside vagina daily for 2 weeks then twice weekly Route: vaginally Dispense 30 grams 11 refills 30 g 11    cyanocobalamin (VITAMIN B-12) 1000 MCG tablet Take 1 tablet (1,000 mcg) by mouth three times a week      escitalopram (LEXAPRO) 20 MG tablet TAKE 1 TABLET BY MOUTH EVERY DAY 90 tablet 1    ferrous gluconate (FERGON) 324 (38 Fe) MG tablet Take 1 tablet (324 mg) by mouth three times a week      fluticasone (FLONASE) 50 MCG/ACT nasal spray SPRAY 1-2  SPRAYS INTO BOTH NOSTRILS DAILY AS NEEDED FOR ALLERGIES 16 mL 11    hydroxychloroquine (PLAQUENIL) 200 MG tablet Take 1 tablet (200 mg) by mouth daily Get annual eye exams for hydroxychloroquine (Plaquenil) monitoring and fax to [phone] 90 tablet 1    insulin glargine (LANTUS PEN) 100 UNIT/ML pen Inject 25 Units Subcutaneous at bedtime 15 mL 1    insulin lispro (HUMALOG KWIKPEN) 100 UNIT/ML (1 unit dial) KWIKPEN Inject Subcutaneous 3 times daily (before meals) Sliding scale insulin; tests BG three times day  If BG <150, no insulin given   If -200 take 2 units  If -250 take 4 units  If -300 take 6 units  If -350 take 10 units  If BG >350 take 14 units      ketoconazole (NIZORAL) 2 % external cream Apply topically 2 times daily as needed for itching 60 g 1    levocetirizine (XYZAL) 5 MG tablet Take 1 tablet (5 mg) by mouth every evening 90 tablet 1    lidocaine (LIDODERM) 5 % patch APPLY PATCH TO PAINFUL AREA FOR UP TO 12 H WITHIN A 24 H PERIOD. REMOVE AFTER 12 HOURS. (Patient taking differently: Apply patch to painful area for up to 12 h within a 24 h period.  Remove after 12 hours.) 30 patch 2    loratadine (CLARITIN) 10 MG tablet TAKE 1 TABLET BY MOUTH EVERY DAY 90 tablet 3    methocarbamol (ROBAXIN) 750 MG tablet Take 1 tablet (750 mg) by mouth 3 times daily as needed for muscle spasms 90 tablet 3    metoprolol succinate ER (TOPROL XL) 50 MG 24 hr tablet Take 1 tablet (50 mg) by mouth 2 times daily 180 tablet 3    mirabegron (MYRBETRIQ) 25 MG 24 hr tablet Take 1 tablet (25 mg) by mouth daily 30 tablet 11    montelukast (SINGULAIR) 10 MG tablet TAKE 1 TABLET BY MOUTH EVERYDAY AT BEDTIME 90 tablet 0    pantoprazole (PROTONIX) 40 MG EC tablet Take 1 tablet (40 mg) by mouth daily (replaces famotidine- should stop taking famotidine) 90 tablet 0    polyethylene glycol (MIRALAX) 17 g packet Take 17 g by mouth daily 10 packet 0    potassium chloride ER (KLOR-CON) 20 MEQ CR tablet Take 2  tablets (40 mEq) by mouth every morning AND 1 tablet (20 mEq) every evening. 270 tablet 3    predniSONE (DELTASONE) 10 MG tablet For gout flares, take the prednisone as 40-30-20-10 mg a day, each course x 3 days then go back to 5 mg a day 30 tablet 3    predniSONE (DELTASONE) 5 MG tablet Take 1 tablet (5 mg) by mouth daily 90 tablet 1    Semaglutide, 2 MG/DOSE, (OZEMPIC) 8 MG/3ML pen Inject 2 mg Subcutaneous every 7 days 9 mL 3    torsemide (DEMADEX) 20 MG tablet Take 2 tablets (40 mg) by mouth every morning AND 1 tablet (20 mg) every evening. 270 tablet 3    traZODone (DESYREL) 50 MG tablet Take 3 tablets (150 mg) by mouth at bedtime 90 tablet 9     ROS:   Relevant ROS is negative     EXAM:  BP (!) 167/88 (BP Location: Right arm, Patient Position: Chair, Cuff Size: Adult Large)   Pulse 60   Wt 83.9 kg (184 lb 14.4 oz)   LMP  (LMP Unknown)   SpO2 98%   BMI 28.96 kg/m    General: appears comfortable, alert and articulate, with walker   Head: normocephalic, atraumatic  Neck: no adenopathy  Orophyarynx: moist mucosa, no lesions, dentition intact  Heart: regular, S1/S2, no murmur, gallop, rub, estimated JVP <10   Lungs: diffuse expiratory wheezing, no crackles,no dullness   Abdomen: soft, non-tender, bowel sounds present, no hepatosplenomegaly  Extremities: trace LE edema, no clubbing, cyanosis.-Thin upper thighs  Neurological: normal speech and affect, no gross motor deficits    Data   Regadenosen MPI 2014: no fixed or inducible defects    Last EKG from 3/7/2023 is sinus rhythm with old anterior infarct pattern    Echo from today final read pending but ejection fraction visually 55 to 60%  Normal IVC  No significant valve disease    Labs personally reviewed      Latest Reference Range & Units 03/07/24 11:16   Sodium 135 - 145 mmol/L 139   Potassium 3.4 - 5.3 mmol/L 3.8   Chloride 98 - 107 mmol/L 104   Carbon Dioxide (CO2) 22 - 29 mmol/L 24   Urea Nitrogen 8.0 - 23.0 mg/dL 17.0   Creatinine 0.51 - 0.95 mg/dL 0.95    GFR Estimate >60 mL/min/1.73m2 59 (L)   Calcium 8.8 - 10.2 mg/dL 9.3   Anion Gap 7 - 15 mmol/L 11   Glucose 70 - 99 mg/dL 270 (H)   (L): Data is abnormally low  (H): Data is abnormally high      Assessment and Plan:  In summary this is a very pleasant 83  F with HFpEF who is here for follow up.     In support of the diagnosis of HFpEF includes mild LA enlargement, echocardiographic signs of increase LV filling pressures, increased nt-bnp, as well as a diuretic requirement and symptomatic improvement on diuretics.    The risk factors for HFpEF in her include age, gender, HTN, diabetes, sleep apnea and obesity.  A prior stress test was negative for CAD. She is presently euvolemic on exam and is NYHA class II(mutifactorial).    The mainstays of therapy for HFpEF include volume management, blood pressure control, treating underlying sleep apnea if present, weight management, exercise training, rate control for atrial fibrillation as well as consideration for a rhythm control strategy, as well as consideration for clinical trials.     HFpEF: She is NYHA class II, stage C,   Given weight loss, low blood pressures etc. over the last 6 to 8 months we stopped her spironolactone-   She has gained weight back again and BP is now elevated again   Will check BP  home over the next month - if still elevated will add back aldactone 25 mg daily first     In the future we could consider adding back SGL 2 inhibitor but also want to avoid polypharmacy   Volume status: euvolemic today - continue torsemide 40/20       Atrial fibrillation [id number] (age >75, HTN, CHF, gender) -paroxysmal last-EKG  showing sinus rhythm  S/p watchman 9/22   On asprin 81 mg daily     Primary prevention: on statin, on ASA  -I do not want to lower her dose of statin from here as she is on such a low-dose already    Deconditioning - -improved - encouragement provided      Return to clinic in 6 months with labs echo, w sooner as needed      Radha  MD Moe  Associate Professor of Medicine   [facility] Division of Cardiology       Optimal Vascular Metrics    Blood Pressure - rechecking in the coming weeks     On Aspirin  Yes    On Statin  Yes    Tobacco use  No      Please do not hesitate to contact me if you have any questions/concerns.     Sincerely,     Radha Rosa MD

## 2024-03-07 NOTE — PROGRESS NOTES
March 7, 2024    Follow up HFpEF    HPI:   Patient has interstitial lung disease (moderate restriction), Sjogren's syndrome, HTN, atrial fibrillation, diverticulitis s/p colectomy 2011 who I am following for HFpEF. She is here for routine HFpEF follow up.     Recent events:   COVID-19 in March 2023. Then had hip surgery - lost a lot of strength with this.    When weight was low after all of this - her BP was low too and aldactone was stopped   Now recovered and gained the weight back.     She had a watchman placed for GI bleeding on anticoagulation   She does have paroxysmal atrial fibrillation she is only on baby aspirin       This visit:   No lower extremity edema  No PND orthopnea  She is more limited from her musculoskeletal issues than her breathing presently  Able to walk into the clinic without stopping   Blood pressures have been slightly elevated at the last few clinic visits but she is also stressed due to moving   She does wear her CPAP mask routinely.   No chest pain   No palpitations   Recently started on ozempic   She presently denies dark stools- CBC recently stable       PAST MEDICAL HISTORY:  Past Medical History:   Diagnosis Date    Alcohol abuse, in remission     Allergic rhinitis, cause unspecified     allegra helps when she takes it    Antiplatelet or antithrombotic long-term use     Atrial fibrillation (H)     in hosp in 11/11 after surgery w/ fluid overload    Cardiomegaly     LVH on stress echo- cardiac w/u at [facility] ER- neg CT scan for PE, neg stress echo in 8/06    Chest pain, unspecified     Congestive heart failure (H)     Depressive disorder     Diabetes (H)     Disorder of bone and cartilage, unspecified     osteopenia (had been on prempro), improved on 6/06 dexa, stable dexa 11/10    Diverticulosis of colon (without mention of hemorrhage)     last episode yrs ago    Essential hypertension, benign     Follicular bronchiolitis (H)     associated with Sjogrens, dx by chest CT showing  mosaic attenuation and air trapping    Gastro-oesophageal reflux disease     ILD (interstitial lung disease) (H)     associated with Sjogrens, also has mildly elevated IgG4, first noted on chest CT 2015 (mild changes) and also has small airways disease; ILD improved on follow up chest CT 2018.    Insomnia, unspecified     weaned off clonazepam    Irregular heart beat     Lumbago 07/2009    MRI with NEAL, now seeing Dr. Cain for sciatic sx's    Major depressive disorder, recurrent episode, moderate (H)     Obstructive sleep apnea     uses cpap    Osteoarthrosis, unspecified whether generalized or localized, unspecified site     Sjogren's syndrome (H24)     + RG and SSA and lip bx    Sleep apnea     uses a cpap machine    Tobacco use disorder     chantix in 9/07, started again in 6/08, working     FAMILY HISTORY:  Mother had MI and CHF in her 60's. 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  Moving to assisted living this week   Illness of friends     CURRENT MEDICATIONS:    Current Outpatient Medications   Medication Sig Dispense Refill    acetaminophen (TYLENOL) 500 MG tablet Take 2 tablets (1,000 mg) by mouth every 8 hours as needed (max 6 tablets/24 hours, 2 tablets/dose) (Patient not taking: Reported on 1/30/2024) 60 tablet 0    acyclovir (ZOVIRAX) 400 MG tablet Take 1 tablet (400 mg) by mouth every 8 hours For a couple days 15 tablet 2    acyclovir (ZOVIRAX) 5 % external ointment Apply topically as needed (6 times day PRN for outbreaks) As needed for outbreaks 15 g 3    albuterol (PROAIR HFA/PROVENTIL HFA/VENTOLIN HFA) 108 (90 Base) MCG/ACT inhaler Inhale 2 puffs into the lungs every 6 hours as needed for wheezing or shortness of breath      alendronate (FOSAMAX) 70 MG tablet Take 1 tablet (70 mg) by mouth every 7 days 12 tablet 1    allopurinol (ZYLOPRIM) 100 MG tablet Take 1 tablet (100 mg) by mouth daily 90 tablet 1     anakinra (KINERET) 100 MG/0.67ML SOSY injection Inject 0.67 mLs (100 mg) Subcutaneous daily 20.1 mL 3    aspirin (ASA) 81 MG EC tablet Take 1 tablet (81 mg) by mouth 2 times daily 60 tablet 0    atorvastatin (LIPITOR) 10 MG tablet TAKE 1/2 TABLET BY MOUTH EVERY DAY 45 tablet 3    azithromycin (ZITHROMAX) 250 MG tablet TAKE 1 TABLET BY MOUTH EVERY DAY 30 tablet 5    BD PEN NEEDLE JANE 2ND GEN 32G X 4 MM miscellaneous USE TO TEST 4 TIMES A  each 1    blood glucose (ACCU-CHEK SHANNON PLUS) test strip USE TO TEST BLOOD SUGARS 3 TIMES DAILY 300 strip 0    blood glucose (NO BRAND SPECIFIED) lancets standard Use to test blood sugar 3 times daily or as directed 100 Lancet 3    BREO ELLIPTA 100-25 MCG/ACT inhaler TAKE 1 PUFF BY MOUTH EVERY DAY 1 each 11    cevimeline (EVOXAC) 30 MG capsule Take 1 capsule (30 mg) by mouth 3 times daily as needed      Cholecalciferol (VITAMIN D3) 50 MCG (2000 UT) CAPS TAKE 100 MCG BY MOUTH DAILY (TAKE 2 TABLET (50 MCG) BY MOUTH DAILY - ORAL) 180 capsule 0    COMPOUNDED NON-CONTROLLED SUBSTANCE (CMPD RX) - PHARMACY TO MIX COMPOUNDED MEDICATION Estriol 1 mg/g Apply small amount to finger and apply to inside vagina daily for 2 weeks then twice weekly Route: vaginally Dispense 30 grams 11 refills 30 g 11    cyanocobalamin (VITAMIN B-12) 1000 MCG tablet Take 1 tablet (1,000 mcg) by mouth three times a week      escitalopram (LEXAPRO) 20 MG tablet TAKE 1 TABLET BY MOUTH EVERY DAY 90 tablet 1    ferrous gluconate (FERGON) 324 (38 Fe) MG tablet Take 1 tablet (324 mg) by mouth three times a week      fluticasone (FLONASE) 50 MCG/ACT nasal spray SPRAY 1-2 SPRAYS INTO BOTH NOSTRILS DAILY AS NEEDED FOR ALLERGIES 16 mL 11    hydroxychloroquine (PLAQUENIL) 200 MG tablet Take 1 tablet (200 mg) by mouth daily Get annual eye exams for hydroxychloroquine (Plaquenil) monitoring and fax to [phone] 90 tablet 1    insulin glargine (LANTUS PEN) 100 UNIT/ML pen Inject 25 Units Subcutaneous at bedtime 15 mL  1    insulin lispro (HUMALOG KWIKPEN) 100 UNIT/ML (1 unit dial) KWIKPEN Inject Subcutaneous 3 times daily (before meals) Sliding scale insulin; tests BG three times day  If BG <150, no insulin given   If -200 take 2 units  If -250 take 4 units  If -300 take 6 units  If -350 take 10 units  If BG >350 take 14 units      ketoconazole (NIZORAL) 2 % external cream Apply topically 2 times daily as needed for itching 60 g 1    levocetirizine (XYZAL) 5 MG tablet Take 1 tablet (5 mg) by mouth every evening 90 tablet 1    lidocaine (LIDODERM) 5 % patch APPLY PATCH TO PAINFUL AREA FOR UP TO 12 H WITHIN A 24 H PERIOD. REMOVE AFTER 12 HOURS. (Patient taking differently: Apply patch to painful area for up to 12 h within a 24 h period.  Remove after 12 hours.) 30 patch 2    loratadine (CLARITIN) 10 MG tablet TAKE 1 TABLET BY MOUTH EVERY DAY 90 tablet 3    methocarbamol (ROBAXIN) 750 MG tablet Take 1 tablet (750 mg) by mouth 3 times daily as needed for muscle spasms 90 tablet 3    metoprolol succinate ER (TOPROL XL) 50 MG 24 hr tablet Take 1 tablet (50 mg) by mouth 2 times daily 180 tablet 3    mirabegron (MYRBETRIQ) 25 MG 24 hr tablet Take 1 tablet (25 mg) by mouth daily 30 tablet 11    montelukast (SINGULAIR) 10 MG tablet TAKE 1 TABLET BY MOUTH EVERYDAY AT BEDTIME 90 tablet 0    pantoprazole (PROTONIX) 40 MG EC tablet Take 1 tablet (40 mg) by mouth daily (replaces famotidine- should stop taking famotidine) 90 tablet 0    polyethylene glycol (MIRALAX) 17 g packet Take 17 g by mouth daily 10 packet 0    potassium chloride ER (KLOR-CON) 20 MEQ CR tablet Take 2 tablets (40 mEq) by mouth every morning AND 1 tablet (20 mEq) every evening. 270 tablet 3    predniSONE (DELTASONE) 10 MG tablet For gout flares, take the prednisone as 40-30-20-10 mg a day, each course x 3 days then go back to 5 mg a day 30 tablet 3    predniSONE (DELTASONE) 5 MG tablet Take 1 tablet (5 mg) by mouth daily 90 tablet 1    Semaglutide, 2  MG/DOSE, (OZEMPIC) 8 MG/3ML pen Inject 2 mg Subcutaneous every 7 days 9 mL 3    torsemide (DEMADEX) 20 MG tablet Take 2 tablets (40 mg) by mouth every morning AND 1 tablet (20 mg) every evening. 270 tablet 3    traZODone (DESYREL) 50 MG tablet Take 3 tablets (150 mg) by mouth at bedtime 90 tablet 9     ROS:   Relevant ROS is negative     EXAM:  BP (!) 167/88 (BP Location: Right arm, Patient Position: Chair, Cuff Size: Adult Large)   Pulse 60   Wt 83.9 kg (184 lb 14.4 oz)   LMP  (LMP Unknown)   SpO2 98%   BMI 28.96 kg/m    General: appears comfortable, alert and articulate, with walker   Head: normocephalic, atraumatic  Neck: no adenopathy  Orophyarynx: moist mucosa, no lesions, dentition intact  Heart: regular, S1/S2, no murmur, gallop, rub, estimated JVP <10   Lungs: diffuse expiratory wheezing, no crackles,no dullness   Abdomen: soft, non-tender, bowel sounds present, no hepatosplenomegaly  Extremities: trace LE edema, no clubbing, cyanosis.-Thin upper thighs  Neurological: normal speech and affect, no gross motor deficits    Data   Regadenosen MPI 2014: no fixed or inducible defects    Last EKG from 3/7/2023 is sinus rhythm with old anterior infarct pattern    Echo from today final read pending but ejection fraction visually 55 to 60%  Normal IVC  No significant valve disease    Labs personally reviewed      Latest Reference Range & Units 03/07/24 11:16   Sodium 135 - 145 mmol/L 139   Potassium 3.4 - 5.3 mmol/L 3.8   Chloride 98 - 107 mmol/L 104   Carbon Dioxide (CO2) 22 - 29 mmol/L 24   Urea Nitrogen 8.0 - 23.0 mg/dL 17.0   Creatinine 0.51 - 0.95 mg/dL 0.95   GFR Estimate >60 mL/min/1.73m2 59 (L)   Calcium 8.8 - 10.2 mg/dL 9.3   Anion Gap 7 - 15 mmol/L 11   Glucose 70 - 99 mg/dL 270 (H)   (L): Data is abnormally low  (H): Data is abnormally high      Assessment and Plan:  In summary this is a very pleasant 83  F with HFpEF who is here for follow up.     In support of the diagnosis of HFpEF includes mild LA  enlargement, echocardiographic signs of increase LV filling pressures, increased nt-bnp, as well as a diuretic requirement and symptomatic improvement on diuretics.    The risk factors for HFpEF in her include age, gender, HTN, diabetes, sleep apnea and obesity.  A prior stress test was negative for CAD. She is presently euvolemic on exam and is NYHA class II(mutifactorial).    The mainstays of therapy for HFpEF include volume management, blood pressure control, treating underlying sleep apnea if present, weight management, exercise training, rate control for atrial fibrillation as well as consideration for a rhythm control strategy, as well as consideration for clinical trials.     HFpEF: She is NYHA class II, stage C,   Given weight loss, low blood pressures etc. over the last 6 to 8 months we stopped her spironolactone-   She has gained weight back again and BP is now elevated again   Will check BP  home over the next month - if still elevated will consider aldactone and then ARNI next      In the future we could consider adding back SGL 2 inhibitor but also want to avoid polypharmacy   Volume status: euvolemic today - continue torsemide 40/20       Atrial fibrillation [id number] (age >75, HTN, CHF, gender) -paroxysmal last-EKG  showing sinus rhythm  S/p watchman 9/22   On asprin 81 mg daily     Primary prevention: on statin, on ASA  -I do not want to lower her dose of statin from here as she is on such a low-dose already    Deconditioning - -improved - encouragement provided      Return to clinic in 6 months with labs echo, w sooner as needed      Radha Rosa MD  Associate Professor of Medicine   [facility] Division of Cardiology

## 2024-03-13 ENCOUNTER — VIRTUAL VISIT (OUTPATIENT)
Dept: PHARMACY | Facility: CLINIC | Age: 84
End: 2024-03-13
Payer: COMMERCIAL

## 2024-03-13 ENCOUNTER — TELEPHONE (OUTPATIENT)
Dept: FAMILY MEDICINE | Facility: CLINIC | Age: 84
End: 2024-03-13
Payer: COMMERCIAL

## 2024-03-13 DIAGNOSIS — Z79.4 TYPE 2 DIABETES MELLITUS WITH HYPERGLYCEMIA, WITH LONG-TERM CURRENT USE OF INSULIN (H): ICD-10-CM

## 2024-03-13 DIAGNOSIS — E11.65 TYPE 2 DIABETES MELLITUS WITH HYPERGLYCEMIA, WITH LONG-TERM CURRENT USE OF INSULIN (H): ICD-10-CM

## 2024-03-13 PROCEDURE — 99607 MTMS BY PHARM ADDL 15 MIN: CPT | Mod: 93 | Performed by: PHARMACIST

## 2024-03-13 PROCEDURE — 99605 MTMS BY PHARM NP 15 MIN: CPT | Mod: 93 | Performed by: PHARMACIST

## 2024-03-13 NOTE — LETTER
"Recommended To-Do List      Prepared on: 03/14/2024       You can get the best results from your medications by completing the items on this \"To-Do List.\"      Bring your To-Do List when you go to your doctor. And, share it with your family or caregivers.    My To-Do List:  What we talked about: What I should do:   We need to restart Lantus (also called Basaglar and Insulin Glargine)    Restart Basaglar at 15 units daily (refills sent)          What we talked about: What I should do:   Novolog and Humalog are the same type of insulin that is taken before meals    Use Novolog OR humalog (only one of these) before a meal per sliding scale:   If -200 take 2 units   If -250 take 4 units   If -300 take 6 units   If -350 take 10 units   If BG >350 take 14 units   Test your blood sugar first thing in the morning (Fasting) and before eating. If you can, also test 2 hrs after eating and anytime you feel like you are having a low blood sugar. Keep track of your blood sugars and how much insulin you have taken.           What we talked about: What I should do:                     "

## 2024-03-13 NOTE — PATIENT INSTRUCTIONS
"Recommendations from today's MTM visit:                                                      Restart Basaglar at 15 units daily (refills sent)  Use Novolog OR humalog (only one of these) before a meal per sliding scale:   If -200 take 2 units   If -250 take 4 units   If -300 take 6 units   If -350 take 10 units   If BG >350 take 14 units   3. Test your blood sugar first thing in the morning (Fasting) and before eating. If you can, also test 2 hrs after eating and anytime you feel like you are having a low blood sugar. Keep track of your blood sugars and how much insulin you have taken.   4. Keep taking Ozempic 2mg weekly.     Follow-up: By phone at 1:30 on Monday 3/18. Sabrina will call you    It was great speaking with you today.  I value your experience and would be very thankful for your time in providing feedback in our clinic survey. In the next few days, you may receive an email or text message from [employer] with a link to a survey related to your  clinical pharmacist.\"     To schedule another MTM appointment, please call the clinic directly or you may call the MTM scheduling line at [phone] or toll-free at [phone].     My Clinical Pharmacist's contact information:                                                      Please feel free to contact me with any questions or concerns you have.      Sabrina Meek, Pharm.D., M.B.A., [facility] Center[facility]  MTM Pharmacist, [facility]  Pager: [phone]  Email: [email or web address]      "

## 2024-03-13 NOTE — PROGRESS NOTES
Medication Therapy Management (MTM) Encounter    ASSESSMENT:                            Medication Adherence/Access: See below for considerations    Diabetes   Hypoglycemia likely due to using rapid acting insulin (and two different kinds of rapid acting insulin) in place of basal insulin. Since she has been off basal insulin for quite some time and using significant amounts of meal-time insulin, will start with low doses and titration.   A1c was not at goal at last check - unknown how she was taking insulin prior to this. UACR elevated. Could consider retrial of Jardiance in the future (especially if weight gain does not improve with insulin changes and to minimize insulin use to avoid further confusion).   Eye and foot exam UTD. Vaccines UTD.     PLAN:                            Restart Basaglar at 15 units daily (refills sent)  Use Novolog OR humalog (only one of these) before a meal per sliding scale:   If -200 take 2 units   If -250 take 4 units   If -300 take 6 units   If -350 take 10 units   If BG >350 take 14 units   3. Test your blood sugar first thing in the morning (Fasting) and before eating. If you can, also test 2 hrs after eating and anytime you feel like you are having a low blood sugar. Keep track of your blood sugars and how much insulin you have taken.   4. Keep taking Ozempic 2mg weekly.     Follow-up: By phone at 1:30 on Monday 3/18. Sabrina will call you.   Plan to review additional medications/conditions at follow-up.     SUBJECTIVE/OBJECTIVE:                          Betty Tee is a 83 year old female called for a follow-up visit. Patient was accompanied by her friend, Nghia.      Reason for visit: Hypoglycemia - see TE from today for additional details.    Allergies/ADRs: Reviewed in chart  Past Medical History: Reviewed in chart  Tobacco: She reports that she quit smoking about 12 years ago. Her smoking use included cigarettes. She started smoking about 54 years  "ago. She smoked an average of 0.5 packs per day. She has never used smokeless tobacco.  Alcohol: not currently using  Social: Just moved to a new apartment in [place]. Increase in physical activity as a result.     Medication Adherence/Access: No concerns from Betty, but upon discussion with insulin, discovered she has been taking insulin incorrectly, see below.     Diabetes   Ozempic 2mg weekly  Lantus 25 units - has not taken in at least a month  Novolog 30 units daily  Humalog 8 units plus sliding scale below   If -200 take 2 units   If -250 take 4 units   If -300 take 6 units   If -350 take 10 units   If BG >350 take 14 units     Aspirin 81mg daily    Side effects: Denies nausea/constipation.  She is frustrated that in the last few months she feels like she has gained about 10 pounds. She feels like this is body mass vs. Fluid retention. Denies SOB beyond her baseline.     Blood sugar monitorin-4 time(s) daily; Ranges: (patient reported) Fasting- \"always under 150, last few readings 134, 124, 179. Readings prior to meals are all across the board.  She has felt hypoglycemic often, but has just been reaching for food to treat it vs. Checking until earlier this week when she did check and was in the 60s. She was so weak she could barely get to the fridge.     Current diabetes symptoms: none     Eye exam is up to date  Foot exam: up to date  ----------------  I spent 20 minutes with this patient today. All changes were made via collaborative practice agreement with Ilene Tristan MD. A copy of the visit note was provided to the patient's provider(s).    A summary of these recommendations was sent via [email or web address].    Sabrina Meek, MarcosD, JAMES, BCACP  MTM Pharmacist, [facility]     Telemedicine Visit Details  Type of service:  Telephone visit  Start Time:  4:26pm  End Time:  4:46pm     Medication Therapy Recommendations  Type 2 diabetes mellitus with hyperglycemia, " with long-term current use of insulin (H)    Current Medication: insulin glargine (LANTUS PEN) 100 UNIT/ML pen   Rationale: Does not understand instructions - Adherence - Adherence   Recommendation: Provide Education   Status: Accepted - no CPA Needed          Current Medication: insulin lispro (HUMALOG KWIKPEN) 100 UNIT/ML (1 unit dial) KWIKPEN   Rationale: Duplicate Therapy - Unnecessary medication therapy - Indication   Recommendation: Discontinue Medication   Status: Patient Agreed - Adherence/Education   Note: Only take Novolog or Humalog - not both

## 2024-03-13 NOTE — LETTER
_  Medication List        Prepared on: 03/14/2024     Bring your Medication List when you go to the doctor, hospital, or   emergency room. And, share it with your family or caregivers.     Note any changes to how you take your medications.  Cross out medications when you no longer use them.    Medication How I take it Why I use it Prescriber   acetaminophen (TYLENOL) 500 MG tablet Take 2 tablets (1,000 mg) by mouth every 8 hours as needed (max 6 tablets/24 hours, 2 tablets/dose) Mild pain LIZY Arvizu CNS   acyclovir (ZOVIRAX) 400 MG tablet Take 1 tablet (400 mg) by mouth every 8 hours For a couple days Recurrent Cold Sores Ilene Tristan MD   acyclovir (ZOVIRAX) 5 % external ointment Apply topically as needed (6 times day PRN for outbreaks) As needed for outbreaks Recurrent Cold Sores Ilene Tristan MD   albuterol (PROAIR HFA/PROVENTIL HFA/VENTOLIN HFA) 108 (90 Base) MCG/ACT inhaler Inhale 2 puffs into the lungs every 6 hours as needed for wheezing or shortness of breath ILD (Interstitial Lung Disease) (H) Edmundo Rawls MD   alendronate (FOSAMAX) 70 MG tablet Take 1 tablet (70 mg) by mouth every 7 days Osteopenia Zulma Lopez MD   allopurinol (ZYLOPRIM) 100 MG tablet Take 1 tablet (100 mg) by mouth daily Gout prevention Zulma Lopez MD   anakinra (KINERET) 100 MG/0.67ML SOSY injection Inject 0.67 mLs (100 mg) Subcutaneous daily Gout treatment Zulma Lopez MD   aspirin (ASA) 81 MG EC tablet Take 1 tablet (81 mg) by mouth 2 times daily Heart protection LIZY Arvizu CNS   atorvastatin (LIPITOR) 10 MG tablet TAKE 1/2 TABLET BY MOUTH EVERY DAY High cholesterol Radha Rosa MD   azithromycin (ZITHROMAX) 250 MG tablet TAKE 1 TABLET BY MOUTH EVERY DAY Follicular Bronchiolitis  Maryjane Tillman MD   BD PEN NEEDLE JANE 2ND GEN 32G X 4 MM miscellaneous USE TO TEST 4 TIMES A DAY Type 2 diabetes Ilene Tristan MD   blood glucose (ACCU-CHEK SHANNON PLUS) test  strip USE TO TEST BLOOD SUGARS 3 TIMES DAILY Type 2 diabetes Ilene Tristan MD   blood glucose (NO BRAND SPECIFIED) lancets standard Use to test blood sugar 3 times daily or as directed Type 2 diabetes Ilene Tristan MD   BREO ELLIPTA 100-25 MCG/ACT inhaler TAKE 1 PUFF BY MOUTH EVERY DAY Follicular Bronchiolitis Maryjane Tillman MD   cevimeline (EVOXAC) 30 MG capsule Take 1 capsule (30 mg) by mouth 3 times daily as needed Sjogren's syndrome  Edmundo Rawls MD   Cholecalciferol (VITAMIN D3) 50 MCG (2000 UT) CAPS TAKE 100 MCG BY MOUTH DAILY (TAKE 2 TABLET (50 MCG) BY MOUTH DAILY - ORAL) Vitamin D Deficiency Ilene Trisatn MD   COMPOUNDED NON-CONTROLLED SUBSTANCE (CMPD RX) - PHARMACY TO MIX COMPOUNDED MEDICATION Estriol 1 mg/g Apply small amount to finger and apply to inside vagina daily for 2 weeks then twice weekly Route: vaginally Dispense 30 grams 11 refills Atrophic Vaginitis Vale Nassar MD   cyanocobalamin (VITAMIN B-12) 1000 MCG tablet Take 1 tablet (1,000 mcg) by mouth three times a week Vitamin B12 Deficiency  Edmundo Rawls MD   escitalopram (LEXAPRO) 20 MG tablet TAKE 1 TABLET BY MOUTH EVERY DAY Depression Ilene Tristan MD   ferrous gluconate (FERGON) 324 (38 Fe) MG tablet Take 1 tablet (324 mg) by mouth three times a week Iron deficiency Edmundo Rawls MD   fluticasone (FLONASE) 50 MCG/ACT nasal spray SPRAY 1-2 SPRAYS INTO BOTH NOSTRILS DAILY AS NEEDED FOR ALLERGIES Seasonal allergies Maryjane Tillman MD   hydroxychloroquine (PLAQUENIL) 200 MG tablet Take 1 tablet (200 mg) by mouth daily Get annual eye exams for hydroxychloroquine (Plaquenil) monitoring and fax to [phone] Sjogren's syndrome  Zulma Lopez MD   insulin glargine (LANTUS PEN) 100 UNIT/ML pen Inject 15 Units Subcutaneous at bedtime Type 2 diabetes  Ilene Tristan MD   insulin lispro (HUMALOG KWIKPEN) 100 UNIT/ML (1 unit dial) KWIKPEN Inject Subcutaneous 3 times daily (before meals)  Sliding scale insulin; tests BG three times dayIf BG <150, no insulin given If -200 take 2 unitsIf -250 take 4 unitsIf -300 take 6 unitsIf -350 take 10 unitsIf BG >350 take 14 units Type 2 diabetes Kevin Pressley MD   ketoconazole (NIZORAL) 2 % external cream Apply topically 2 times daily as needed for itching Cutaneous Candidiasis Ilene Tristan MD   levocetirizine (XYZAL) 5 MG tablet Take 1 tablet (5 mg) by mouth every evening Seasonal allergies Ilene Tristan MD   lidocaine (LIDODERM) 5 % patch APPLY PATCH TO PAINFUL AREA FOR UP TO 12 H WITHIN A 24 H PERIOD. REMOVE AFTER 12 HOURS. Sacral Back Pain Ilene Tristan MD   loratadine (CLARITIN) 10 MG tablet TAKE 1 TABLET BY MOUTH EVERY DAY Seasonal allergies Ilene Tristan MD   methocarbamol (ROBAXIN) 750 MG tablet Take 1 tablet (750 mg) by mouth 3 times daily as needed for muscle spasms Pain Charly Moore MD   metoprolol succinate ER (TOPROL XL) 50 MG 24 hr tablet Take 1 tablet (50 mg) by mouth 2 times daily Blood pressure Radha Rosa MD   mirabegron (MYRBETRIQ) 25 MG 24 hr tablet Take 1 tablet (25 mg) by mouth daily Urge Incontinence Vale Nassar MD   montelukast (SINGULAIR) 10 MG tablet TAKE 1 TABLET BY MOUTH EVERYDAY AT BEDTIME Sjogren's syndrome Ilene Tristan MD   pantoprazole (PROTONIX) 40 MG EC tablet Take 1 tablet (40 mg) by mouth daily (replaces famotidine- should stop taking famotidine) Heartburn Guru Winsome Dias MD   polyethylene glycol (MIRALAX) 17 g packet Take 17 g by mouth daily Constipation LIZY Arvizu CNS   potassium chloride ER (KLOR-CON) 20 MEQ CR tablet Take 2 tablets (40 mEq) by mouth every morning AND 1 tablet (20 mEq) every evening. (HFpEF) heart failure with preserved ejection fraction Radha Rosa MD   predniSONE (DELTASONE) 10 MG tablet For gout flares, take the prednisone as 40-30-20-10 mg a day, each course  x 3 days then go back to 5 mg a day Gout treatment Zulma Lopez MD   predniSONE (DELTASONE) 5 MG tablet Take 1 tablet (5 mg) by mouth daily Gout Zulma Lopez MD   Semaglutide, 2 MG/DOSE, (OZEMPIC) 8 MG/3ML pen Inject 2 mg Subcutaneous every 7 days Type 2 diabetes Ilene Tristan MD   torsemide (DEMADEX) 20 MG tablet Take 2 tablets (40 mg) by mouth every morning AND 1 tablet (20 mg) every evening. Chronic heart failure with preserved ejection fraction Radha Rosa MD   traZODone (DESYREL) 50 MG tablet Take 3 tablets (150 mg) by mouth at bedtime Insomnia Scott Pérez PA-C         Add new medications, over-the-counter drugs, herbals, vitamins, or  minerals in the blank rows below.    Medication How I take it Why I use it Prescriber                                      Allergies:      amoxicillin-pot clavulanate; amoxicillin-pot clavulanate; codeine; penicillins; phenobarbital; seasonal allergies        Side effects I have had:               Other Information:              My notes and questions:

## 2024-03-13 NOTE — TELEPHONE ENCOUNTER
C.W./MTM,  Patient calling  Experienced hypoglycemic episode yesterday  BG 60 yesterday at 3PM - experienced sweating, shaking, weakness  Typically checks BG 3x/day   this AM  States she is staying hydrated and plans to eat more protein today  Reports using 30U Humalog kwikpen  (Appears last Rx 3/2023?)  Also reports taking Novolog sliding scale (do not see on medication list?)      Current ozempic dose 2mg/week  Recent Insulin Rx - glargine 25U at bedtime     Just moved into a new apartment, and has not unpacked yet  Had friend over than ran to find her hard candy    Advised to continue checking BG regularly, keep crackers/juice/protein nearby and call back with new or worsening symptoms    Pt would like to touch base with MTM tomorrow if possible  Any other recommendations in the mean time?    Thanks,  Elise RIVERA RN

## 2024-03-13 NOTE — TELEPHONE ENCOUNTER
Huddled with PCP  PCP advised decreased dose of insulin glargine to 28U daily  (Most recent office visit advised patient to continue 30U dose)  This will be a decrease  Patient takes in the morning - will start new dose tomorrow  And plan to follow up with MTM when available  Will call back if new or worsening symptoms in the mean time  Elise Enciso RN

## 2024-03-13 NOTE — TELEPHONE ENCOUNTER
Called Betty - spoke to Nghia as well who was there with her.     Betty has not taken Basaglar in at least a month. She has been confused and has been taking 30 units of Novolog each morning (thinking this was basaglar) and 8 units of Humalog with meals).     Fasting BG are currently running 124-179, almost always under 150.     Will restart basaglar at lower dose, reduce Humalog to just sliding scale with MTM follow-up on Monday.     Added on to MTM schedule for a visit today -see note for additional details.     Sabrina Meek, PharmD, JAMES, BCACP  MTM Pharmacist, [facility]

## 2024-03-13 NOTE — LETTER
March 14, 2024  Betty Tee  [address]  [facility] [address]    Dear MARC Fitzgerald [facility]     Thank you for talking with me on Mar 13, 2024 about your health and medications. As a follow-up to our conversation, I have included two documents:      Your Recommended To-Do List has steps you should take to get the best results from your medications.  Your Medication List will help you keep track of your medications and how to take them.    If you want to talk about these documents, please call Sabrina Meek RPH at phone: [phone], Monday-Friday 8-4:30pm.    I look forward to working with you and your doctors to make sure your medications work well for you.    Sincerely,  Sabrina Meek RPH  [facility] Pharmacist, [facility]

## 2024-03-18 ENCOUNTER — TELEPHONE (OUTPATIENT)
Dept: PHARMACY | Facility: CLINIC | Age: 84
End: 2024-03-18
Payer: COMMERCIAL

## 2024-03-18 DIAGNOSIS — J30.1 SEASONAL ALLERGIC RHINITIS DUE TO POLLEN: ICD-10-CM

## 2024-03-18 DIAGNOSIS — Z79.4 TYPE 2 DIABETES MELLITUS WITH HYPERGLYCEMIA, WITH LONG-TERM CURRENT USE OF INSULIN (H): ICD-10-CM

## 2024-03-18 DIAGNOSIS — M35.02 SJOGREN'S SYNDROME WITH LUNG INVOLVEMENT (H): ICD-10-CM

## 2024-03-18 DIAGNOSIS — E11.65 TYPE 2 DIABETES MELLITUS WITH HYPERGLYCEMIA, WITH LONG-TERM CURRENT USE OF INSULIN (H): ICD-10-CM

## 2024-03-18 DIAGNOSIS — J84.9 ILD (INTERSTITIAL LUNG DISEASE) (H): ICD-10-CM

## 2024-03-18 RX ORDER — MONTELUKAST SODIUM 10 MG/1
1 TABLET ORAL AT BEDTIME
Qty: 90 TABLET | Refills: 0 | Status: SHIPPED | OUTPATIENT
Start: 2024-03-18 | End: 2024-05-06

## 2024-03-18 RX ORDER — LEVOCETIRIZINE DIHYDROCHLORIDE 5 MG/1
5 TABLET, FILM COATED ORAL EVERY EVENING
Qty: 90 TABLET | Refills: 1 | Status: SHIPPED | OUTPATIENT
Start: 2024-03-18 | End: 2024-07-01

## 2024-03-18 NOTE — TELEPHONE ENCOUNTER
Called patient for follow-up on hypoglycemia (see MTM visit from last Wednesday)  Since I call she has not had any more episodes of hypoglycemia.  Fasting sugars are running between 150 and 164.  Postprandial sugars are in the 200s (229 today).  It is difficult to get details from her as she has not kept track how much insulin she took and when or what her blood sugar was.  She remembers that she has missed at least 4 doses during the day of mealtime insulin.    Plan:   Increase Lantus to 20 units once daily  No change in Humalog/NovoLog dose  MTM follow-up by phone in 1 week.    She would benefit from a CGM-scheduled after her PCP appointment on 4/30 to discuss more in depth.    Sabrina Meek, PharmD, JAMES, BCACP  MTM Pharmacist, [facility]

## 2024-03-19 NOTE — MR AVS SNAPSHOT
After Visit Summary   12/6/2018    Betty Tee    MRN: [id number]           Patient Information     Date Of Birth          1940        Visit Information        Provider Department      12/6/2018 3:00 PM Carmenza Stringer MD [facility] Rheumatology         Follow-ups after your visit        Your next 10 appointments already scheduled     Feb 14, 2019  2:00 PM CST   Echo Complete with [id number]   [facility] Cardiac Services ([facility])    [address] [place] [address]  3rd [facility] [id number] [phone]           1. Please bring or wear a comfortable two-piece outfit. 2. You may eat, drink and take your normal medicines. 3. For any questions that cannot be answered, please contact the ordering physician            Feb 14, 2019  3:00 PM CST   Lab with  LAB   [facility] Lab ([facility])    [address] [place] [id number] [phone]            Feb 14, 2019  3:30 PM CST   (Arrive by 3:15 PM)   RETURN HEART FAILURE with Radha Rosa MD   [facility] Heart Care ([facility])    [address] [place] [address]  [address]  [facility] [id number] [phone]            Feb 26, 2019 12:45 PM CST   Office Visit with Ilene Tristan MD   [facility] ([facility])    [address] [facility] [id number] [phone]           Bring a current list of meds and any records pertaining to this visit. For Physicals, please bring immunization records and any forms needing to be filled out. Please arrive 10 minutes early to complete paperwork.            Mar 01, 2019 10:30 AM CST   FULL PULMONARY FUNCTION with  PFL D   [facility] Pulmonary Function Testing ([facility])    [address] [place] [address]  3rd [facility] [id number] [phone]            Mar 01, 2019 11:30 AM CST   (Arrive by 11:15 AM)   Return Interstitial  "Lung with Maryjane Tillman MD   [facility] ([facility] [place])    [address] [facility] [address] [facility]  [address]  [facility] [address] [phone]            Apr 11, 2019  4:00 PM CDT   (Arrive by 3:45 PM)   Return Visit with Carmenza Stringer MD   [facility] Rheumatology ([facility])    [address]  [address]  [facility] [address] [phone]              Who to contact     If you have questions or need follow up information about today's clinic visit or your schedule please contact [facility] RHEUMATOLOGY directly at [phone].  Normal or non-critical lab and imaging results will be communicated to you by Cmxtwentyhart, letter or phone within 4 business days after the clinic has received the results. If you do not hear from us within 7 days, please contact the clinic through [employer] or phone. If you have a critical or abnormal lab result, we will notify you by phone as soon as possible.  Submit refill requests through [employer] or call your pharmacy and they will forward the refill request to us. Please allow 3 business days for your refill to be completed.          Additional Information About Your Visit        [employer] Information     [employer] gives you secure access to your electronic health record. If you see a primary care provider, you can also send messages to your care team and make appointments. If you have questions, please call your primary care clinic.  If you do not have a primary care provider, please call [phone] and they will assist you.        Care EveryWhere ID     This is your Care EveryWhere ID. This could be used by other organizations to access your [place] medical records  [id number]        Your Vitals Were     Pulse Temperature Height Pulse Oximetry BMI (Body Mass Index)       82 98.2  F (36.8  C) (Oral) 1.702 m (5' 7\") 96% 33.81 kg/m2        Blood Pressure from Last 3 Encounters:   12/06/18 112/68 "   11/28/18 111/77   11/01/18 109/73    Weight from Last 3 Encounters:   12/06/18 97.9 kg (215 lb 14.4 oz)   11/28/18 95.7 kg (211 lb)   11/01/18 95.7 kg (211 lb)              Today, you had the following     No orders found for display       Primary Care Provider Office Phone # Fax #    Ilene Tristan [phone] [address] [address]        Equal Access to Services     [facility]: Hadii aad ku hadasho Soomaali, waaxda luqadaha, qaybta kaalmada adeegyada, waxugo mercedes . So [facility] [phone].    ATENCIÓN: Si habla español, tiene a conti disposición servicios gratuitos de asistencia lingüística. [facility] [phone].    We comply with applicable federal civil rights laws and [place] laws. We do not discriminate on the basis of race, color, national origin, age, disability, sex, sexual orientation, or gender identity.            Thank you!     Thank you for choosing [facility] RHEUMATOLOGY  for your care. Our goal is always to provide you with excellent care. Hearing back from our patients is one way we can continue to improve our services. Please take a few minutes to complete the written survey that you may receive in the mail after your visit with us. Thank you!             Your Updated Medication List - Protect others around you: Learn how to safely use, store and throw away your medicines at www.disposemymeds.org.          This list is accurate as of 12/6/18  4:07 PM.  Always use your most recent med list.                   Brand Name Dispense Instructions for use Diagnosis    acyclovir 400 MG tablet    ZOVIRAX    15 tablet    Take 1 tablet (400 mg) by mouth 3 times daily For a couple days    Recurrent cold sores       acyclovir 5 % external ointment    ZOVIRAX    15 g    Apply topically 6 times daily As needed for outbreaks    Recurrent cold sores       albuterol 108 (90 Base) MCG/ACT inhaler    PROAIR HFA/PROVENTIL HFA/VENTOLIN  HFA    1 Inhaler    Inhale 2 puffs into the lungs every 6 hours    ILD (interstitial lung disease) (H)       alendronate 70 MG tablet    FOSAMAX    4 tablet    Take 1 tablet (70 mg) by mouth every 7 days    Other osteoporosis without current pathological fracture       atorvastatin 10 MG tablet    LIPITOR    90 tablet    TAKE 1 TABLET (10 MG) BY MOUTH DAILY    Hyperlipidemia LDL goal <100       * blood glucose monitoring lancets     4 Box    Use to test blood sugar 4 times daily or as directed.    Type 2 diabetes mellitus without complication, without long-term current use of insulin (H)       * blood glucose monitoring lancets     1 Box    Use to test blood sugar 4 times daily or as directed.  Ok to substitute alternative if insurance prefers.    Type 2 diabetes mellitus without complication, without long-term current use of insulin (H)       * blood glucose monitoring test strip    ACCU-CHEK SHANNON PLUS    120 strip    Use to test blood sugar 4 times daily or as directed.  Ok to substitute alternative if insurance prefers.    Type 2 diabetes mellitus without complication, without long-term current use of insulin (H)       * ACCU-CHEK SHANNON PLUS test strip   Generic drug:  blood glucose monitoring     400 strip    USE TO TEST BLOOD SUGARS 4 TIMES DAILY OR AS DIRECTED    Type 2 diabetes mellitus without complication, without long-term current use of insulin (H)       COMPRESSION STOCKINGS     2 each    Wear compression stockings in affected leg (right leg) or both legs most of the time during the day and take them off at night.    DVT (deep venous thrombosis), right, Postphlebitic syndrome, Chronic anticoagulation, Atrial fibrillation (H)       escitalopram 20 MG tablet    LEXAPRO    90 tablet    TAKE 1 TABLET (20 MG) BY MOUTH DAILY    Major depressive disorder, recurrent episode, moderate (H)       fluticasone 220 MCG/ACT inhaler    FLOVENT HFA    3 Inhaler    Inhale 2 puffs into the lungs 2 times daily    ILD  [Normal] : Oriented to person, place, and time, insight and judgement were intact and the affect was normal (interstitial lung disease) (H), Follicular bronchiolitis (H)       fluticasone 50 MCG/ACT nasal spray    FLONASE    1 Bottle    Spray 1-2 sprays into both nostrils daily as needed for allergies    Seasonal allergic rhinitis due to pollen       gabapentin 300 MG capsule    NEURONTIN    30 capsule    Take 1 capsule (300 mg) by mouth At Bedtime    Sciatica, unspecified laterality       Humidifier Misc     1 each    Continuous humidified air via concentrator.  Diagnosis: ILD Duration: Lifetime 99.    ILD (interstitial lung disease) (H)       insulin glargine 100 UNIT/ML pen     15 mL    Inject 25 Units Subcutaneous daily (to replace lantus)    Type 2 diabetes mellitus with hyperglycemia, with long-term current use of insulin (H)       * insulin pen needle 31G X 8 MM miscellaneous    B-D U/F    400 each    Use 4 times daily (with Lantus and Novolog) or as directed.    Type 2 diabetes mellitus without complication, without long-term current use of insulin (H)       * BD JANE U/F 32G X 4 MM miscellaneous   Generic drug:  insulin pen needle     400 each    USE 4 DAILY AS DIRECTED.    Type 2 diabetes mellitus without complication, without long-term current use of insulin (H)       ketoconazole 2 % external cream    NIZORAL    60 g    Apply topically 2 times daily    Cutaneous candidiasis       lidocaine 5 % patch    LIDODERM    30 patch    Apply patch to painful area for up to 12 h within a 24 h period.  Remove after 12 hours.    Sacral back pain       lisinopril 2.5 MG tablet    PRINIVIL/Zestril    90 tablet    Take 1 tablet (2.5 mg) by mouth daily    Essential hypertension with goal blood pressure less than 140/90       metFORMIN 500 MG 24 hr tablet    GLUCOPHAGE-XR    180 tablet    TAKE 2 TABLETS BY MOUTH DAILY WITH DINNER    Type 2 diabetes mellitus without complication, without long-term current use of insulin (H)       * metoprolol succinate ER 25 MG 24 hr tablet    TOPROL XL    90 tablet    Take 0.5 tablets (12.5 mg)  by mouth 2 times daily Take 50 mg by mouth in combination with 12.5 for a total of 62.5 twice a day    (HFpEF) heart failure with preserved ejection fraction (H), Persistent atrial fibrillation (H)       * metoprolol succinate  MG 24 hr tablet    TOPROL-XL    90 tablet    Take 50 mg by mouth in combination with 12.5 for a total of 62.5 twice a day    Atrial fibrillation with controlled ventricular response (H)       montelukast 10 MG tablet    SINGULAIR    90 tablet    TAKE 1 TABLET BY MOUTH EVERYDAY AT BEDTIME    Sjogren's syndrome with lung involvement (H), ILD (interstitial lung disease) (H)       NovoLOG FLEXPEN 100 UNIT/ML pen   Generic drug:  insulin aspart     15 mL    INJECT 12 UNITS SUBCUTANEOUS 3 TIMES DAILY (WITH MEALS)    Type 2 diabetes mellitus with other specified complication (H)       * order for DME     1 Device    Oxygen: Patient requires supplemental Oxygen 4 LPM via nasal canula with activity. Please provide patient with a home unit and with portability capability. Oxygen will be for a lifetime.    Hypoxia, ILD (interstitial lung disease) (H)       * order for DME     1 each    Equipment being ordered: Full face mask for CPAP - size: F10 large    ANGELICA (obstructive sleep apnea)       order for DME     1 Device    Equipment being ordered: Oxygen Pulsed oxygen portable tank Rate: 4LNC Diagnosis: ILD Duration: Lifetime -99    ILD (interstitial lung disease) (H)       potassium chloride ER 20 MEQ CR tablet    K-DUR/KLOR-CON M    270 tablet    Take 2 tabs (40 meq) in am and 1 tab (20 meq) in pm daily    (HFpEF) heart failure with preserved ejection fraction (H)       * predniSONE 1 MG tablet    DELTASONE    120 tablet    Use with 5 mg tabs to taper: 10 mg and 9 mg every other day for 1m, 9 mg/day for 1m, 9 mg and 8 mg every other day for 1m, etc. until at 7mg    Primary Sjogren's syndrome (H)       * predniSONE 5 MG tablet    DELTASONE    30 tablet    Use with 1 mg tabs to taper: 10 mg and 9 mg  every other day for 1m, 9 mg/day for 1m, 9 mg and 8 mg every other day for 1m, etc. until at 7mg    Primary Sjogren's syndrome (H)       spironolactone 25 MG tablet    ALDACTONE    180 tablet    Take 2 tablets (50 mg) by mouth daily    Chronic diastolic congestive heart failure (H)       tiZANidine 2 MG tablet    ZANAFLEX    90 tablet    Take 1-2 tablets (2-4 mg) by mouth 3 times daily    Right-sided low back pain with right-sided sciatica, unspecified chronicity       torsemide 20 MG tablet    DEMADEX    105 tablet    Take 40 mg in the morning, 30 mg in the afternoon.    (HFpEF) heart failure with preserved ejection fraction (H)       traZODone 50 MG tablet    DESYREL    30 tablet    Take 0.5-1 tablets (25-50 mg) by mouth nightly as needed for sleep    Insomnia due to medical condition       TYLENOL 500 MG tablet   Generic drug:  acetaminophen      Take 500 mg by mouth nightly as needed    Dizziness       warfarin 7.5 MG tablet    COUMADIN    90 tablet    TAKE 1 TABLET BY MOUTH DAILY. CURRENT DOSE IS 7.5 MG DAILY. DOSE ADJUSTED PER INR RESULT.    Atrial fibrillation with controlled ventricular response (H)       * Notice:  This list has 12 medication(s) that are the same as other medications prescribed for you. Read the directions carefully, and ask your doctor or other care provider to review them with you.

## 2024-03-26 ENCOUNTER — VIRTUAL VISIT (OUTPATIENT)
Dept: PHARMACY | Facility: CLINIC | Age: 84
End: 2024-03-26
Payer: COMMERCIAL

## 2024-03-26 DIAGNOSIS — E11.65 TYPE 2 DIABETES MELLITUS WITH HYPERGLYCEMIA, WITH LONG-TERM CURRENT USE OF INSULIN (H): Primary | ICD-10-CM

## 2024-03-26 DIAGNOSIS — Z79.4 TYPE 2 DIABETES MELLITUS WITH HYPERGLYCEMIA, WITH LONG-TERM CURRENT USE OF INSULIN (H): Primary | ICD-10-CM

## 2024-03-26 DIAGNOSIS — N39.41 URGE INCONTINENCE OF URINE: ICD-10-CM

## 2024-03-26 PROCEDURE — 99606 MTMS BY PHARM EST 15 MIN: CPT | Mod: 93 | Performed by: PHARMACIST

## 2024-03-26 NOTE — PROGRESS NOTES
Medication Therapy Management (MTM) Encounter    ASSESSMENT:                            Medication Adherence/Access: No issues identified    Diabetes   Difficult to determine next steps from minimal BG information. Fastings appear to be too high (we had restarted her at a much lower dose of Lantus since she had been off for quite some time and was having hypoglycemia) and may benefit from dose increase. Will hold on increasing mealtime insulin for now. Consider re-introducing SGLT2i for CKD/HF benefit as well as BG management or changing to Mounjaro.     Urinary Frequency:   May benefit from a hold of Myrbetriq to determine if it is the cause of her symptoms and if the side effects outweigh benefits to bladder control.     PLAN:                            Increase Lantus to 20 units daily.   Stop Myrbetriq for a week - if you feel better, then we'll need to decide if the side effects are worth continuing the medicine. If you don't notice a change in your symptoms, then restart and see Dr. Tristan.     Follow-up: 2 weeks, sooner if needed.     SUBJECTIVE/OBJECTIVE:                          Betty Tee is a 83 year old female called for a follow-up visit.       Reason for visit: Follow-up on BG and insulin dosing.    Allergies/ADRs: Reviewed in chart  Past Medical History: Reviewed in chart  Tobacco: She reports that she quit smoking about 12 years ago. Her smoking use included cigarettes. She started smoking about 54 years ago. She smoked an average of 0.5 packs per day. She has never used smokeless tobacco.  Alcohol: not currently using    Medication Adherence/Access: no issues reported    Diabetes   Ozempic 2mg weekly  Aspirin 81mg daily  Lantus 15 units  Novolog sliding scale below   If -200 take 2 units   If -250 take 4 units   If -300 take 6 units   If -350 take 10 units   If BG >350 take 14 units     Difficult to determine what her blood sugars are, gives me minimal readings on the  "phone.   States fasting readings the last few days: 154, 156, 152, 164   Recent post-meal reading of 176, reports she hasn't seen anything over 250 - has seen a few readings over 200 \"depending on what I'm eating\".        Urinary Frequency:   Myrbetriq 25mg daily  Feels like since she started this that she has fullness in her ear and head. Moves around from side to side. No illness symptoms (rhinorrhea, fever, cough, congestion, HA). Started right around the same time as she started Myrbetriq.  Feels like Myrbetriq has been helpful in that she now makes it to the bathroom, but still has to go frequently.   She does not check her BP at home to know if it has increased since starting Myrbetriq.   ----------------  I spent 9 minutes with this patient today. All changes were made via collaborative practice agreement with Ilene Tristan MD. A copy of the visit note was provided to the patient's provider(s).    A summary of these recommendations was sent via [employer].    Sabrina Meek, Ashok, JAMES, BCACP  MTM Pharmacist, [facility]     Telemedicine Visit Details  Type of service:  Telephone visit  Start Time:  11:43 AM  End Time:  11:52 AM     Medication Therapy Recommendations  No medication therapy recommendations to display   "

## 2024-03-26 NOTE — PATIENT INSTRUCTIONS
"Recommendations from today's MTM visit:                                                    MTM (medication therapy management) is a service provided by a clinical pharmacist designed to help you get the most of out of your medicines.   Today we reviewed what your medicines are for, how to know if they are working, that your medicines are safe and how to make your medicine regimen as easy as possible.        Increase Lantus to 20 units daily.   Stop Myrbetriq for a week - if you feel better, then we'll need to decide if the side effects are worth continuing the medicine. If you don't notice a change in your symptoms, then restart and see Dr. Tristan.     Follow-up: 2 weeks, sooner if needed.     It was great speaking with you today.  I value your experience and would be very thankful for your time in providing feedback in our clinic survey. In the next few days, you may receive an email or text message from [employer] with a link to a survey related to your  clinical pharmacist.\"     To schedule another MTM appointment, please call the clinic directly or you may call the MTM scheduling line at [phone] or toll-free at [phone].     My Clinical Pharmacist's contact information:                                                      Please feel free to contact me with any questions or concerns you have.      Sabrina Meek, Pharm.D., M.B.A., [facility]  MTM Pharmacist, [facility]  Pager: [phone]  Email: [email or web address]     "

## 2024-03-30 DIAGNOSIS — M10.9 ACUTE GOUT OF LEFT FOOT, UNSPECIFIED CAUSE: Primary | ICD-10-CM

## 2024-03-30 DIAGNOSIS — M06.00 SERONEGATIVE RHEUMATOID ARTHRITIS (H): ICD-10-CM

## 2024-03-30 DIAGNOSIS — E11.9 TYPE 2 DIABETES MELLITUS WITHOUT COMPLICATION, WITHOUT LONG-TERM CURRENT USE OF INSULIN (H): ICD-10-CM

## 2024-04-01 RX ORDER — BLOOD SUGAR DIAGNOSTIC
STRIP MISCELLANEOUS
Qty: 300 STRIP | Refills: 0 | Status: SHIPPED | OUTPATIENT
Start: 2024-04-01 | End: 2024-06-03

## 2024-04-05 RX ORDER — PREDNISONE 5 MG/1
5 TABLET ORAL DAILY
Qty: 90 TABLET | Refills: 1 | Status: SHIPPED | OUTPATIENT
Start: 2024-04-05

## 2024-04-09 ENCOUNTER — TELEPHONE (OUTPATIENT)
Dept: PHARMACY | Facility: CLINIC | Age: 84
End: 2024-04-09
Payer: COMMERCIAL

## 2024-04-09 NOTE — TELEPHONE ENCOUNTER
Called Betty to follow-up on insulin adjustment and Myrbetriq hold. No answer,  left.     Sabrina Meek, PharmD, JAMES, BCACP  MTM Pharmacist, [facility]

## 2024-04-17 DIAGNOSIS — I50.32 CHRONIC HEART FAILURE WITH PRESERVED EJECTION FRACTION (H): ICD-10-CM

## 2024-04-25 ENCOUNTER — LAB (OUTPATIENT)
Dept: LAB | Facility: CLINIC | Age: 84
End: 2024-04-25
Payer: COMMERCIAL

## 2024-04-25 DIAGNOSIS — Z79.4 TYPE 2 DIABETES MELLITUS WITH HYPERGLYCEMIA, WITH LONG-TERM CURRENT USE OF INSULIN (H): ICD-10-CM

## 2024-04-25 DIAGNOSIS — E78.5 HYPERLIPIDEMIA LDL GOAL <100: ICD-10-CM

## 2024-04-25 DIAGNOSIS — I10 ESSENTIAL HYPERTENSION WITH GOAL BLOOD PRESSURE LESS THAN 140/90: ICD-10-CM

## 2024-04-25 DIAGNOSIS — E11.65 TYPE 2 DIABETES MELLITUS WITH HYPERGLYCEMIA, WITH LONG-TERM CURRENT USE OF INSULIN (H): ICD-10-CM

## 2024-04-25 LAB
ANION GAP SERPL CALCULATED.3IONS-SCNC: 12 MMOL/L (ref 7–15)
BUN SERPL-MCNC: 17.2 MG/DL (ref 8–23)
CALCIUM SERPL-MCNC: 9.7 MG/DL (ref 8.8–10.2)
CHLORIDE SERPL-SCNC: 99 MMOL/L (ref 98–107)
CHOLEST SERPL-MCNC: 128 MG/DL
CREAT SERPL-MCNC: 1.02 MG/DL (ref 0.51–0.95)
DEPRECATED HCO3 PLAS-SCNC: 27 MMOL/L (ref 22–29)
EGFRCR SERPLBLD CKD-EPI 2021: 54 ML/MIN/1.73M2
FASTING STATUS PATIENT QL REPORTED: NO
GLUCOSE SERPL-MCNC: 244 MG/DL (ref 70–99)
HBA1C MFR BLD: 8.4 %
HDLC SERPL-MCNC: 75 MG/DL
LDLC SERPL CALC-MCNC: 30 MG/DL
NONHDLC SERPL-MCNC: 53 MG/DL
POTASSIUM SERPL-SCNC: 4.2 MMOL/L (ref 3.4–5.3)
SODIUM SERPL-SCNC: 138 MMOL/L (ref 135–145)
TRIGL SERPL-MCNC: 117 MG/DL
TSH SERPL DL<=0.005 MIU/L-ACNC: 1.85 UIU/ML (ref 0.3–4.2)

## 2024-04-25 PROCEDURE — 99000 SPECIMEN HANDLING OFFICE-LAB: CPT | Performed by: PATHOLOGY

## 2024-04-25 PROCEDURE — 80048 BASIC METABOLIC PNL TOTAL CA: CPT | Performed by: PATHOLOGY

## 2024-04-25 PROCEDURE — 82570 ASSAY OF URINE CREATININE: CPT | Performed by: FAMILY MEDICINE

## 2024-04-25 PROCEDURE — 83036 HEMOGLOBIN GLYCOSYLATED A1C: CPT | Performed by: FAMILY MEDICINE

## 2024-04-25 PROCEDURE — 36415 COLL VENOUS BLD VENIPUNCTURE: CPT | Performed by: PATHOLOGY

## 2024-04-25 PROCEDURE — 80061 LIPID PANEL: CPT | Performed by: PATHOLOGY

## 2024-04-25 PROCEDURE — 84443 ASSAY THYROID STIM HORMONE: CPT | Performed by: PATHOLOGY

## 2024-04-25 RX ORDER — TORSEMIDE 20 MG/1
TABLET ORAL
Qty: 270 TABLET | Refills: 3 | Status: SHIPPED | OUTPATIENT
Start: 2024-04-25

## 2024-04-25 NOTE — TELEPHONE ENCOUNTER
torsemide (DEMADEX) 20 MG tablet 270 tablet 3 4/26/2023     Last Office Visit: 3/7/24  Future Office visit:  9/12/24          Diuretics (Including Combos) Protocol [id number]/17/2024 09:17 AM   Protocol Details Blood pressure under 140/90 in past 12 months    Has GFR on file in past 12 months and most recent value is normal        Component      Latest Ref Rng 3/7/2024  11:16 AM   Sodium      135 - 145 mmol/L 139    Potassium      3.4 - 5.3 mmol/L 3.8    Chloride      98 - 107 mmol/L 104    Carbon Dioxide (CO2)      22 - 29 mmol/L 24    Anion Gap      7 - 15 mmol/L 11    Urea Nitrogen      8.0 - 23.0 mg/dL 17.0    Creatinine      0.51 - 0.95 mg/dL 0.95    GFR Estimate      >60 mL/min/1.73m2 59 (L)      BP Readings from Last 3 Encounters:   03/07/24 (!) 167/88   02/28/24 (!) 151/87   01/30/24 133/83       Routing refill request to provider for review/approval because:  Blood pressure out of range   ABNORMAL LAB    Kira Weber RN  P Red Flag Triage/MRT

## 2024-04-26 DIAGNOSIS — E55.9 VITAMIN D DEFICIENCY: ICD-10-CM

## 2024-04-26 LAB
CREAT UR-MCNC: 48.5 MG/DL
MICROALBUMIN UR-MCNC: 59.3 MG/L
MICROALBUMIN/CREAT UR: 122.27 MG/G CR (ref 0–25)

## 2024-04-26 RX ORDER — ACETAMINOPHEN 160 MG
100 TABLET,DISINTEGRATING ORAL DAILY
Qty: 180 CAPSULE | Refills: 2 | Status: SHIPPED | OUTPATIENT
Start: 2024-04-26

## 2024-04-30 ENCOUNTER — OFFICE VISIT (OUTPATIENT)
Dept: PHARMACY | Facility: CLINIC | Age: 84
End: 2024-04-30
Payer: COMMERCIAL

## 2024-04-30 ENCOUNTER — OFFICE VISIT (OUTPATIENT)
Dept: FAMILY MEDICINE | Facility: CLINIC | Age: 84
End: 2024-04-30
Payer: COMMERCIAL

## 2024-04-30 VITALS
HEART RATE: 56 BPM | HEIGHT: 66 IN | RESPIRATION RATE: 18 BRPM | TEMPERATURE: 98.1 F | WEIGHT: 182.2 LBS | BODY MASS INDEX: 29.28 KG/M2 | OXYGEN SATURATION: 93 % | SYSTOLIC BLOOD PRESSURE: 139 MMHG | DIASTOLIC BLOOD PRESSURE: 79 MMHG

## 2024-04-30 DIAGNOSIS — B00.1 RECURRENT COLD SORES: ICD-10-CM

## 2024-04-30 DIAGNOSIS — E78.5 HYPERLIPIDEMIA LDL GOAL <100: ICD-10-CM

## 2024-04-30 DIAGNOSIS — I10 ESSENTIAL HYPERTENSION WITH GOAL BLOOD PRESSURE LESS THAN 140/90: ICD-10-CM

## 2024-04-30 DIAGNOSIS — E11.65 TYPE 2 DIABETES MELLITUS WITH HYPERGLYCEMIA, WITH LONG-TERM CURRENT USE OF INSULIN (H): Primary | ICD-10-CM

## 2024-04-30 DIAGNOSIS — R79.89 ELEVATED PARATHYROID HORMONE: ICD-10-CM

## 2024-04-30 DIAGNOSIS — F33.1 MAJOR DEPRESSIVE DISORDER, RECURRENT EPISODE, MODERATE (H): ICD-10-CM

## 2024-04-30 DIAGNOSIS — Z00.00 ENCOUNTER FOR MEDICARE ANNUAL WELLNESS EXAM: Primary | ICD-10-CM

## 2024-04-30 DIAGNOSIS — M35.02 SJOGREN'S SYNDROME WITH LUNG INVOLVEMENT (H): ICD-10-CM

## 2024-04-30 DIAGNOSIS — E11.65 TYPE 2 DIABETES MELLITUS WITH HYPERGLYCEMIA, WITH LONG-TERM CURRENT USE OF INSULIN (H): ICD-10-CM

## 2024-04-30 DIAGNOSIS — N18.31 STAGE 3A CHRONIC KIDNEY DISEASE (H): ICD-10-CM

## 2024-04-30 DIAGNOSIS — F10.11 ALCOHOL ABUSE, IN REMISSION: ICD-10-CM

## 2024-04-30 DIAGNOSIS — Z79.4 TYPE 2 DIABETES MELLITUS WITH HYPERGLYCEMIA, WITH LONG-TERM CURRENT USE OF INSULIN (H): Primary | ICD-10-CM

## 2024-04-30 DIAGNOSIS — Z79.4 TYPE 2 DIABETES MELLITUS WITH HYPERGLYCEMIA, WITH LONG-TERM CURRENT USE OF INSULIN (H): ICD-10-CM

## 2024-04-30 DIAGNOSIS — E55.9 VITAMIN D DEFICIENCY: ICD-10-CM

## 2024-04-30 DIAGNOSIS — G63 POLYNEUROPATHY ASSOCIATED WITH UNDERLYING DISEASE (H): ICD-10-CM

## 2024-04-30 DIAGNOSIS — M10.9 GOUT, UNSPECIFIED CAUSE, UNSPECIFIED CHRONICITY, UNSPECIFIED SITE: ICD-10-CM

## 2024-04-30 DIAGNOSIS — Z71.85 VACCINE COUNSELING: ICD-10-CM

## 2024-04-30 DIAGNOSIS — N39.41 URGE INCONTINENCE OF URINE: ICD-10-CM

## 2024-04-30 PROCEDURE — G0439 PPPS, SUBSEQ VISIT: HCPCS | Performed by: FAMILY MEDICINE

## 2024-04-30 PROCEDURE — 90480 ADMN SARSCOV2 VAC 1/ONLY CMP: CPT | Performed by: FAMILY MEDICINE

## 2024-04-30 PROCEDURE — 99214 OFFICE O/P EST MOD 30 MIN: CPT | Mod: 25 | Performed by: FAMILY MEDICINE

## 2024-04-30 PROCEDURE — 99606 MTMS BY PHARM EST 15 MIN: CPT | Performed by: PHARMACIST

## 2024-04-30 PROCEDURE — 99607 MTMS BY PHARM ADDL 15 MIN: CPT | Performed by: PHARMACIST

## 2024-04-30 PROCEDURE — 91320 SARSCV2 VAC 30MCG TRS-SUC IM: CPT | Performed by: FAMILY MEDICINE

## 2024-04-30 RX ORDER — ACYCLOVIR 400 MG/1
400 TABLET ORAL EVERY 8 HOURS
Qty: 15 TABLET | Refills: 2 | Status: SHIPPED | OUTPATIENT
Start: 2024-04-30

## 2024-04-30 RX ORDER — BUPROPION HYDROCHLORIDE 150 MG/1
150 TABLET ORAL EVERY MORNING
Qty: 90 TABLET | Refills: 0 | Status: SHIPPED | OUTPATIENT
Start: 2024-04-30 | End: 2024-07-01

## 2024-04-30 SDOH — HEALTH STABILITY: PHYSICAL HEALTH: ON AVERAGE, HOW MANY DAYS PER WEEK DO YOU ENGAGE IN MODERATE TO STRENUOUS EXERCISE (LIKE A BRISK WALK)?: 0 DAYS

## 2024-04-30 SDOH — HEALTH STABILITY: PHYSICAL HEALTH: ON AVERAGE, HOW MANY MINUTES DO YOU ENGAGE IN EXERCISE AT THIS LEVEL?: 0 MIN

## 2024-04-30 ASSESSMENT — SOCIAL DETERMINANTS OF HEALTH (SDOH): HOW OFTEN DO YOU GET TOGETHER WITH FRIENDS OR RELATIVES?: MORE THAN THREE TIMES A WEEK

## 2024-04-30 ASSESSMENT — ANXIETY QUESTIONNAIRES
GAD7 TOTAL SCORE: 2
8. IF YOU CHECKED OFF ANY PROBLEMS, HOW DIFFICULT HAVE THESE MADE IT FOR YOU TO DO YOUR WORK, TAKE CARE OF THINGS AT HOME, OR GET ALONG WITH OTHER PEOPLE?: SOMEWHAT DIFFICULT
GAD7 TOTAL SCORE: 2
1. FEELING NERVOUS, ANXIOUS, OR ON EDGE: NOT AT ALL
6. BECOMING EASILY ANNOYED OR IRRITABLE: SEVERAL DAYS
7. FEELING AFRAID AS IF SOMETHING AWFUL MIGHT HAPPEN: NOT AT ALL
4. TROUBLE RELAXING: NOT AT ALL
IF YOU CHECKED OFF ANY PROBLEMS ON THIS QUESTIONNAIRE, HOW DIFFICULT HAVE THESE PROBLEMS MADE IT FOR YOU TO DO YOUR WORK, TAKE CARE OF THINGS AT HOME, OR GET ALONG WITH OTHER PEOPLE: SOMEWHAT DIFFICULT
7. FEELING AFRAID AS IF SOMETHING AWFUL MIGHT HAPPEN: NOT AT ALL
GAD7 TOTAL SCORE: 2
2. NOT BEING ABLE TO STOP OR CONTROL WORRYING: NOT AT ALL
3. WORRYING TOO MUCH ABOUT DIFFERENT THINGS: SEVERAL DAYS
5. BEING SO RESTLESS THAT IT IS HARD TO SIT STILL: NOT AT ALL

## 2024-04-30 ASSESSMENT — PAIN SCALES - GENERAL: PAINLEVEL: MODERATE PAIN (5)

## 2024-04-30 NOTE — PROGRESS NOTES
Medication Therapy Management (MTM) Encounter    ASSESSMENT:                            Medication Adherence/Access: No issues identified    Diabetes   Discussed different MOA of insulin, appropriate administration times and that it's Ok if she's only eating 2 meals/day - she should only take insulin when eating.   Discussed hypoglycemia management.   A1c is not meeting goal <8%, will monitor closely as we know she was not taking insulin correctly (see previous MTM notes).  UACR is not at goal. ACE-I stopped due to low BP by cardiology in 2019. Unable to tolerate SGLT2i due to increase in SCr/decrease in eGFR (went from SCr baseline of 0.85 to 1.08 after a few weeks, held dose and rechecked and again increased to 1.39 since this was >50% change, SGLT2i was stopped, SCr returned to baseline over the next few months)    Gout/inflammatory arthritis:   Unclear if her current symptoms are from gout, but given sudden onset and her description, worth trying a kinaret injection to see if symptoms improve.     Urinary Frequency:   Stable. BP is very near goal today (<140/90) and not far from baseline prior to starting Myrbetriq. Will monitor closely.     Cold Sores:   Stable, will send in refills.     PLAN:                            Start bupropion 150mg nce a day in the morning.   Try an injection of Kinaret to see if that helps with your knee pain  Let Sabrina know if you have more low blood sugars  Acyclovir refill sent    Follow-up: 2 weeks by phone     Future - consider retrial of ACE/ARB (CKD benefit, BP management) or SGLT2i (HFpEF, DM, CKD benefit)    SUBJECTIVE/OBJECTIVE:                          Betty Tee is a 83 year old female coming in for a follow-up visit. Patient was accompanied by her friend, Nghia.      Reason for visit: Has a few questions about insulin and knee pain. Would like a refill of acyclovir (forgot to ask Dr. Tristan).  She saw Dr. Tristan immediately prior to our visit who started bupropion  to help with depression.     Allergies/ADRs: Reviewed in chart  Past Medical History: Reviewed in chart  Tobacco: She reports that she quit smoking about 12 years ago. Her smoking use included cigarettes. She started smoking about 54 years ago. She has a 20.8 pack-year smoking history. She has never used smokeless tobacco.  Alcohol: not currently using    Medication Adherence/Access: no issues reported    Diabetes   Ozempic 2mg weekly  Aspirin 81mg daily  Lantus 20 units  Novolog sliding scale below - usually 1-2 times a day. Sometimes forgets.   If -200 take 2 units   If -250 take 4 units   If -300 take 6 units   If -350 take 10 units   If BG >350 take 14 units     Does not have glucometer with her today. States she is testing 2-3 times/day before her first meal (around 11), prior to dinner and then sometimes at bedtime.   States fasting readings the last few days are a little bit under 150, a few 130s but few and far between. Mostly eating two meals/day, testing prior. Prior to dinner finds that she is usuallin in the 150s. She has only had one low blood sugar in the last month, but got down to 60 and was confused. Someone she was with recognized what was happening and helped her treat this. She is not interested in CGM.     Eye exam is up to date  Foot exam: due    Gout/inflammatory arthritis:   Allopurinol 100mg daily  Kineret 100mg daily when having a gout flare  Prednisone 5mg daily, taper on hand for flare  Had a sudden onset of pain in her knee today (she repositions frequently during our visit due to the discomfort). No injury to the knee or reason that she can think of for the pain and wondering if it's gout.     Urinary Frequency:   Myrbetriq 25mg daily  See last visit note for details. In brief, she felt a sense of fullness in her head and thought it was due to Myrbetriq. She held the med for a week or two, no improvement in her head symptoms but her urinary frequency was much  worse and she resumed this med without issues. Finds it has been helpful.     Cold Sores:   Acyclovir 400 mg three times daily as needed - needs refill, effective when needed.   Acyclovir 5% ointment available as needed. Has not needed recently.   No side effects reported.   ----------------  I spent 30 minutes with this patient today. All changes were made via collaborative practice agreement with Ilene Tristan MD. A copy of the visit note was provided to the patient's provider(s).    A summary of these recommendations was given to the patient.    Sabrina Meek, MarcosD, JAMES, BCACP  MTM Pharmacist, [facility]      Medication Therapy Recommendations  No medication therapy recommendations to display

## 2024-04-30 NOTE — PROGRESS NOTES
Preventive Care Visit  [facility]  Ilene Tristan MD, Family Medicine  Apr 30, 2024        Assessment & Plan     Encounter for Medicare annual wellness exam  [facility]- Wellness Visit Notes: MD updates  -Mammo done 1/28/2014, mammo screening discontinued.  -DEXA done 8/31/2023 (impression: osteoporosis; most negative T-score of -2.6 at L femoral neck).  Next due in 2026.  On alendronate through rheumatology since 6/20.  -Colon cancer screening done via colonoscopy on 5/03/2022 (impression: diverticulosis; no polyp removed). Next due in 2032.   -Immunizations: Pt is due for RSV vaccine, however advised pt to complete RSV vaccine at local pharmacy given Medicare insurance.   -Education: Pt education provided on Hearing Concerns and Incontinence Concerns  -Derm: Does patient regularly see dermatologist? No      Type 2 diabetes mellitus with hyperglycemia, with long-term current use of insulin (H)/Polyneuropathy associated with underlying disease (H24)  Essential hypertension with goal blood pressure less than 140/90  Stage 3a chronic kidney disease (H)  Hyperlipidemia LDL goal <100  Pt relatively stable in these areas.  DM II- A1C coming down, now at 8.4 from 8.9 and 9.6.  On ozempic and insulin.  MTM helping with DM med adjustments.   /79 today, but has been higher.  Sees cardiology for CHF, was taken off spironolactone for low BPs.  ---She will try and check BPs at home, and msg if they are higher, check with cardiology (Dr. Rosa) if she should go back on it.    Major depressive disorder, recurrent episode, moderate (H)  Stressors with recent move, though went better than expected with [employer] moving team.  Likes the new place, but harder to be have privacy.  Has been on lexapro 20mg/d for awhile, wonders about wellbutrin friend is on, who calls it her 'happy pill'.  Would be interested to see if her mood could be improved.  --Will try addition of wellbutrin XL 150mg/d to  her lexapro 20mg/d.  Follow-up in ~2 months for recheck mood, sooner if concerns.  - buPROPion (WELLBUTRIN XL) 150 MG 24 hr tablet; Take 1 tablet (150 mg) by mouth every morning    Sjogren's syndrome with lung involvement (H)  Cont follow-up with pulmonary, Dr. Tillman.    Gout/Osteoporosis/Sjogrens-  Following with Dr. Lopez.  Last appt 2/24.  Pt on allopurinol, prn anakinra for gout flares.  For osteoporosis, on alendronate (though EGD report w/ GAVE work-up (reviewed from 11/22) mentioned this may be concerning). Dr. Lopez also noted worsening dexa from 8/23, though report notes left hip, and interval change notes worsening of R total femur (s/p recent R hip replacement). She had referred pt to endocrinology based on failed fosamax treatment.    Elevated parathyroid hormone  Vitamin D deficiency  Elevated PTH in '22, improved with Vit D supplementation.      Vaccine counseling  Risks/benefits discussed, given today.   - COVID-19 12+ (2023-24) (PFIZER)    Patient has been advised of split billing requirements and indicates understanding: Yes        Counseling  Appropriate preventive services were discussed with this patient, including applicable screening as appropriate for fall prevention, nutrition, physical activity, Tobacco-use cessation, weight loss and cognition.  Checklist reviewing preventive services available has been given to the patient.  The patient's PHQ-9 score is consistent with mild depression. She was provided with information regarding depression.                 Daniel Hernández is a 83 year old, presenting for the following:  Physical (AWV)    Health Care Directive  Patient does not have a Health Care Directive or Living Will: Discussed advance care planning with patient; however, patient declined at this time.    [facility]    Wellness Visit Notes:  -Mammo done 1/28/2014, mammo screening discontinued.  -DEXA done 8/31/2023 (impression: osteoporosis; most negative T-score of -2.6 at L femoral  neck).  Next due in .  -Colon cancer screening done via colonoscopy on 2022 (impression: diverticulosis; no polyp removed). Next due in .   -Immunizations: Pt is due for RSV vaccine, however advised pt to complete RSV vaccine at local pharmacy given Medicare insurance.   -Education: Pt education provided on Hearing Concerns and Incontinence Concerns  -Derm: Does patient regularly see dermatologist? No  ------------------------------------------------    Recent huge move from their house of decades to assisted living apartments.  She had been very down about the move, but it's been better than expected so far.  The moving company was incredible.  [employer] (takes pictures of bookshelves and cabinets, and sets up the new place as close as possible to the old place).  The new place also has many window and high ceilings, mario alberto view.  Bigger space.  Kitchen a bit smaller but good counter space.    DM II- seeing MTM today, so did not go into depth, but she feels she's on the right track now after getting meds confused - see 3/13/24 TE.  Had likely been taking the short acting insulin instead of the long acting insulin.  Feels it's good now.  Ozempic- going well, no known se's.    Concerns-  --Allergies- drippy nose is really annoying.   Claritin in am, xyzal in pm  Flonase- if she remembers.  Singulair daily.  Worse in bathroom area- will consider Dr. Stone/allergy referral.    Sjogrens w/ lung involvement-  Following with pulmonary, has appt coming up.  Takes one daily usually - hopefully Breo, but not sure- will check w/ MTM.    --Mood- has been on the mood med lexapro for a long time.    She sometimes feels like she's not contributing like she used to or wants.  Also can't get away from the community now, which can be draining/annoying.  Sister Yvette- living with her still, not doing well. Her three sisters who  in memory care.  One other sister has advanced dementia, who also lives there and  comes to get Yvette for a walk too early, Betty gets irritated with her, similar to other's reactions.    Friend moved in, and she says she's on her happy pills- wellbutrin- wondering if that would help her?  Consider cymbalta in future - for combo mood/neuropathy....    R hand-   Curling of 4-5th fingers.  Sees Dr. Krause tomorrow.    Periph neurop-  Stabbing pain in fingers and lower legs and toes.  Intermittent, short zingers.  Did use gabapentin back in '21, wasn't helping those pains, but was having more severe pain then.            4/30/2024   General Health   How would you rate your overall physical health? Good   Feel stress (tense, anxious, or unable to sleep) To some extent   (!) STRESS CONCERN      4/30/2024   Nutrition   Diet: Regular (no restrictions)         4/30/2024   Exercise   Days per week of moderate/strenous exercise 0 days   Average minutes spent exercising at this level 0 min   (!) EXERCISE CONCERN      4/30/2024   Social Factors   Frequency of gathering with friends or relatives More than three times a week   Worry food won't last until get money to buy more No   Food not last or not have enough money for food? No   Do you have housing?  Yes   Are you worried about losing your housing? No   Lack of transportation? No   Unable to get utilities (heat,electricity)? No         4/30/2024   Fall Risk   Fallen 2 or more times in the past year? No   Trouble with walking or balance? Yes          4/30/2024   Activities of Daily Living- Home Safety   Needs help with the following daily activites None of the above   Safety concerns in the home None of the above         4/30/2024   Dental   Dentist two times every year? Yes         4/30/2024   Hearing Screening   Hearing concerns? (!) I NEED TO ASK PEOPLE TO SPEAK UP OR REPEAT THEMSELVES.         4/30/2024   Driving Risk Screening   Patient/family members have concerns about driving No         4/30/2024   General Alertness/Fatigue Screening   Have you been  more tired than usual lately? No         2024   Urinary Incontinence Screening   Bothered by leaking urine in past 6 months Yes          Today's PHQ-9 Score:       2024     1:05 PM   PHQ-9 SCORE   PHQ-9 Total Score MyChart 6 (Mild depression)   PHQ-9 Total Score 6         2024   Substance Use   Alcohol more than 3/day or more than 7/wk Not Applicable   Do you have a current opioid prescription? No   How severe/bad is pain from 1 to 10? 5/10   Do you use any other substances recreationally? No     Social History     Tobacco Use    Smoking status: Former     Current packs/day: 0.00     Average packs/day: 0.5 packs/day for 41.6 years (20.8 ttl pk-yrs)     Types: Cigarettes     Start date:      Quit date: 2011     Years since quittin.8    Smokeless tobacco: Never    Tobacco comments:     1/ ppd   Vaping Use    Vaping status: Never Used   Substance Use Topics    Alcohol use: No     Alcohol/week: 0.0 standard drinks of alcohol     Comment: In recovery beginning     Drug use: No          Mammogram Screening - After age 74- determine frequency with patient based on health status, life expectancy and patient goals          Reviewed and updated as needed this visit by Provider   Tobacco  Allergies  Meds  Problems  Med Hx  Surg Hx  Fam Hx            Lab work is in process  Labs reviewed in EPIC  BP Readings from Last 3 Encounters:   24 (!) 152/79   24 139/79   24 (!) 167/88    Wt Readings from Last 3 Encounters:   24 82.6 kg (182 lb 3.2 oz)   24 82.6 kg (182 lb 3.2 oz)   24 83.9 kg (184 lb 14.4 oz)                  Patient Active Problem List   Diagnosis    Temporomandibular joint disorder    Diverticulitis of colon    Osteopenia of multiple sites    Alcohol abuse, in remission    Restless legs syndrome (RLS)    Major depressive disorder, recurrent episode, moderate (H)    Essential hypertension with goal blood pressure less than 140/90    Advanced  directives, counseling/discussion    Colouterine fistula    Paroxysmal atrial fibrillation (H)    Left ventricular hypertrophy    Health Care Home    Insomnia    Breast fibroadenoma    History of deep vein thrombosis (DVT) of lower extremity    Fatty liver disease, nonalcoholic    Rib pain    Gastroesophageal reflux disease without esophagitis    Enlarged lymph node    Sjogren's syndrome with lung involvement (H)    ANGELICA (obstructive sleep apnea)    ILD (interstitial lung disease) (H)    Lower GI bleed    (HFpEF) heart failure with preserved ejection fraction (H)    Type 2 diabetes mellitus with hyperglycemia, with long-term current use of insulin (H)    Hyperlipidemia LDL goal <100    Inflammatory arthritis    Follicular bronchiolitis (H)    Stage 3a chronic kidney disease (H)    Urge incontinence of urine    Osteoarthritis of right hip    Seasonal allergic rhinitis due to pollen    Elevated parathyroid hormone    Persistent atrial fibrillation (H)    Vitamin D deficiency    GAVE (gastric antral vascular ectasia)    Iron deficiency anemia, unspecified iron deficiency anemia type    Gout, unspecified cause, unspecified chronicity, unspecified site    Gastroenteritis    Lactic acidosis    Recurrent cold sores    Seronegative rheumatoid arthritis (H)    Polyneuropathy associated with underlying disease (H24)     Past Surgical History:   Procedure Laterality Date    APPENDECTOMY      ARTHROPLASTY HIP Right 5/31/2023    Procedure: Right total hip arthroplasty;  Surgeon: Thomas Shannon MD;  Location:  OR    BACK SURGERY  1962    BACK SURGERY  1962    BIOPSY BREAST  09/27/2002    Biopsy Left Breast    BIOPSY BREAST Left 09/27/2002    BREAST SURGERY      CARDIAC SURGERY      CARDIAC SURGERY      CHOLECYSTECTOMY  1990's?    CHOLECYSTECTOMY      COLECTOMY LEFT  11/07/2011    Procedure:COLECTOMY LEFT; Laparoscopic mobilization of splenic flexture, sigmoid colectomy, coloprotoscopy, loop illeostomy; Surgeon:CK CASTANEDA  NICO; Location:[facility] OR    COLECTOMY LEFT  2011    COLONOSCOPY  1990,s    COLONOSCOPY N/A 5/3/2022    Procedure: COLONOSCOPY;  Surgeon: Guru Winsome Dias MD;  Location: [facility] GI    CV LEFT ATRIAL APPENDAGE CLOSURE N/A 2022    Procedure: Left Atrial Appendage Closure;  Surgeon: Uzair Crews MD;  Location:  HEART CARDIAC CATH LAB    ENT SURGERY      EYE SURGERY      FLEXIBLE SIGMOIDOSCOPY  2011    HYSTERECTOMY TOTAL ABDOMINAL, BILATERAL SALPINGO-OOPHORECTOMY, COMBINED  2011    Procedure:COMBINED HYSTERECTOMY TOTAL ABDOMINAL, BILATERAL SALPINGO-OOPHORECTOMY; total abdominal hysterectomy, bilateral salpingo-oophorectomy; Surgeon:ALETA MANUEL; Location:[facility] OR    HYSTERECTOMY TOTAL ABDOMINAL, BILATERAL SALPINGO-OOPHORECTOMY, COMBINED  2011    ILEOSTOMY  2012    takedown loop ileostomy     INSERT STENT URETER  2011    Procedure:INSERT STENT URETER; Placement of Bilateral Ureteral Stents ; Surgeon:PRANEETH BRYANT; Location:[facility] OR    SIGMOIDECTOMY  2012    Dr. Castaneda, Sigmoidectomy for diverticular abscess, iliostomy afterwards until repair    SIGMOIDOSCOPY FLEXIBLE  2011    Procedure:SIGMOIDOSCOPY FLEXIBLE; Flexible Sigmoidoscopy; Surgeon:CK CASTANEDA; Location:[facility] OR    TAKEDOWN ILEOSTOMY  2012    Procedure:TAKEDOWN ILEOSTOMY; Takedown Loop Ileostomy ; Surgeon:CK CASTANEDA; Location:[facility] OR    URETERAL STENT PLACEMENT  2011    ZZC APPENDECTOMY  1970's?    ZZC NONSPECIFIC PROCEDURE  2005    exploratory abd lap, adhesions, resolved RLQ pain, diverticulitis episodes       Social History     Tobacco Use    Smoking status: Former     Current packs/day: 0.00     Average packs/day: 0.5 packs/day for 41.6 years (20.8 ttl pk-yrs)     Types: Cigarettes     Start date:      Quit date: 2011     Years since quittin.8    Smokeless tobacco: Never    Tobacco comments:      ppd   Substance Use Topics    Alcohol use: No      Alcohol/week: 0.0 standard drinks of alcohol     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     Family History   Problem Relation Age of Onset    C.A.D. Mother 63        MI- first at age 63    Heart Disease Mother     Hypertension Mother     Cerebrovascular Disease Mother     Hyperlipidemia Mother     Coronary Artery Disease Mother         MI-first at age 63    Other - See Comments Mother         cerebrovascular disease     Alcohol/Drug Father     Alzheimer Disease Father     Dementia Father     Hypertension Father     Hyperlipidemia Father     Substance Abuse Father     Diabetes Sister     Hypertension Sister     Hyperlipidemia Sister     Substance Abuse Sister     Asthma Sister     C.A.D. Sister 52        Minor MI- age 50's    Heart Disease Sister     Hypertension Sister     Hypertension Sister     Cancer - colorectal Sister 48        Late 40's early 50's    Gastrointestinal Disease Sister         Diverticulitis    Lipids Sister     Lipids Sister     Diabetes Sister     Heart Disease Sister         CHF    Cancer Sister         lung, smoker    Substance Abuse Sister     Asthma Sister     Cancer Sister     Coronary Artery Disease Sister         minor MI-age 50's    Heart Disease Sister     Hypertension Sister     Hypertension Sister     Colon Cancer Sister     Diverticulitis Sister     Lung Cancer Sister     Heart Disease Sister         CHF    Diabetes Sister     Asthma Sister     Hypertension Brother     Prostate Cancer Brother 74        Dx'd age 74    Gastrointestinal Disease Brother         Diverticulitis    Parkinsonism Brother     Substance Abuse Brother     Prostate Cancer Brother     Hyperlipidemia Brother     Hypertension Brother     Prostate Cancer Brother     Diverticulitis Brother     Parkinsonism Brother     Breast Cancer Daughter     Breast Cancer Daughter     Diabetes Other     Hypertension Other     Anesthesia Reaction No family hx of     Deep Vein Thrombosis (DVT) No family hx of          Current  Outpatient Medications   Medication Sig Dispense Refill    buPROPion (WELLBUTRIN XL) 150 MG 24 hr tablet Take 1 tablet (150 mg) by mouth every morning 90 tablet 0    ACCU-CHEK SHANNON PLUS test strip USE TO TEST BLOOD SUGARS 3 TIMES DAILY 300 strip 0    acyclovir (ZOVIRAX) 400 MG tablet Take 1 tablet (400 mg) by mouth every 8 hours For a couple days 15 tablet 2    acyclovir (ZOVIRAX) 5 % external ointment Apply topically as needed (6 times day PRN for outbreaks) As needed for outbreaks 15 g 3    albuterol (PROAIR HFA/PROVENTIL HFA/VENTOLIN HFA) 108 (90 Base) MCG/ACT inhaler Inhale 2 puffs into the lungs every 6 hours as needed for wheezing or shortness of breath      alendronate (FOSAMAX) 70 MG tablet Take 1 tablet (70 mg) by mouth every 7 days 12 tablet 1    allopurinol (ZYLOPRIM) 100 MG tablet Take 1 tablet (100 mg) by mouth daily 90 tablet 1    anakinra (KINERET) 100 MG/0.67ML SOSY injection Inject 0.67 mLs (100 mg) Subcutaneous daily 20.1 mL 3    aspirin (ASA) 81 MG EC tablet Take 1 tablet (81 mg) by mouth 2 times daily 60 tablet 0    atorvastatin (LIPITOR) 10 MG tablet TAKE 1/2 TABLET BY MOUTH EVERY DAY 45 tablet 3    azithromycin (ZITHROMAX) 250 MG tablet TAKE 1 TABLET BY MOUTH EVERY DAY 30 tablet 5    BD PEN NEEDLE JANE 2ND GEN 32G X 4 MM miscellaneous USE TO TEST 4 TIMES A  each 1    blood glucose (NO BRAND SPECIFIED) lancets standard Use to test blood sugar 3 times daily or as directed 100 Lancet 3    BREO ELLIPTA 100-25 MCG/ACT inhaler TAKE 1 PUFF BY MOUTH EVERY DAY 1 each 11    cevimeline (EVOXAC) 30 MG capsule Take 1 capsule (30 mg) by mouth 3 times daily as needed      Cholecalciferol (VITAMIN D3) 50 MCG (2000 UT) CAPS TAKE 100 MCG BY MOUTH DAILY (TAKE 2 TABLET (50 MCG) BY MOUTH DAILY - ORAL) 180 capsule 2    COMPOUNDED NON-CONTROLLED SUBSTANCE (CMPD RX) - PHARMACY TO MIX COMPOUNDED MEDICATION Estriol 1 mg/g Apply small amount to finger and apply to inside vagina daily for 2 weeks then twice weekly  Route: vaginally Dispense 30 grams 11 refills 30 g 11    cyanocobalamin (VITAMIN B-12) 1000 MCG tablet Take 1 tablet (1,000 mcg) by mouth three times a week      escitalopram (LEXAPRO) 20 MG tablet Take 1 tablet (20 mg) by mouth daily 90 tablet 1    fluticasone (FLONASE) 50 MCG/ACT nasal spray SPRAY 1-2 SPRAYS INTO BOTH NOSTRILS DAILY AS NEEDED FOR ALLERGIES 16 mL 11    hydroxychloroquine (PLAQUENIL) 200 MG tablet Take 1 tablet (200 mg) by mouth daily Get annual eye exams for hydroxychloroquine (Plaquenil) monitoring and fax to [phone] 90 tablet 1    insulin glargine (LANTUS PEN) 100 UNIT/ML pen Inject 20 Units Subcutaneous daily      insulin lispro (HUMALOG KWIKPEN) 100 UNIT/ML (1 unit dial) KWIKPEN Inject Subcutaneous 3 times daily (before meals) Sliding scale insulin; tests BG three times day  If BG <150, no insulin given   If -200 take 2 units  If -250 take 4 units  If -300 take 6 units  If -350 take 10 units  If BG >350 take 14 units      ketoconazole (NIZORAL) 2 % external cream Apply topically 2 times daily as needed for itching 60 g 1    levocetirizine (XYZAL) 5 MG tablet TAKE 1 TABLET BY MOUTH EVERY EVENING 90 tablet 1    lidocaine (LIDODERM) 5 % patch APPLY PATCH TO PAINFUL AREA FOR UP TO 12 H WITHIN A 24 H PERIOD. REMOVE AFTER 12 HOURS. (Patient taking differently: Apply patch to painful area for up to 12 h within a 24 h period.  Remove after 12 hours.) 30 patch 2    loratadine (CLARITIN) 10 MG tablet TAKE 1 TABLET BY MOUTH EVERY DAY 90 tablet 3    methocarbamol (ROBAXIN) 750 MG tablet Take 1 tablet (750 mg) by mouth 3 times daily as needed for muscle spasms 90 tablet 3    metoprolol succinate ER (TOPROL XL) 50 MG 24 hr tablet TAKE 1 TABLET BY MOUTH TWICE A  tablet 3    mirabegron (MYRBETRIQ) 25 MG 24 hr tablet Take 1 tablet (25 mg) by mouth daily 30 tablet 11    montelukast (SINGULAIR) 10 MG tablet TAKE 1 TABLET BY MOUTH EVERYDAY AT BEDTIME 90 tablet 3    pantoprazole  (PROTONIX) 40 MG EC tablet Take 1 tablet (40 mg) by mouth daily (replaces famotidine- should stop taking famotidine) 90 tablet 0    polyethylene glycol (MIRALAX) 17 g packet Take 17 g by mouth daily 10 packet 0    potassium chloride ER (KLOR-CON) 20 MEQ CR tablet Take 2 tablets (40 mEq) by mouth every morning AND 1 tablet (20 mEq) every evening. 270 tablet 3    predniSONE (DELTASONE) 10 MG tablet For gout flares, take the prednisone as 40-30-20-10 mg a day, each course x 3 days then go back to 5 mg a day 30 tablet 3    predniSONE (DELTASONE) 5 MG tablet Take 1 tablet (5 mg) by mouth daily 90 tablet 1    Semaglutide, 2 MG/DOSE, (OZEMPIC) 8 MG/3ML pen Inject 2 mg Subcutaneous every 7 days 9 mL 3    torsemide (DEMADEX) 20 MG tablet TAKE 2 TABLETS (40 MG) BY MOUTH EVERY MORNING AND 1 TABLET (20 MG) DAILY AT 2 PM. TAKE THE AFTERNOON DOSE WITH AN EXTRA 10 MG TAB FOR A TOTAL OF 30 MG IN THE AFTERNOON 270 tablet 3    traZODone (DESYREL) 50 MG tablet Take 3 tablets (150 mg) by mouth at bedtime 90 tablet 9     Allergies   Allergen Reactions    Amoxicillin-Pot Clavulanate Nausea and Vomiting    Amoxicillin-Pot Clavulanate     Codeine Nausea and Vomiting     PN: LW Reaction: HIVES    Penicillins Nausea and Vomiting     PN: LW Reaction: GI Upset    Phenobarbital Itching    Seasonal Allergies      Recent Labs   Lab Test 04/25/24  1635 03/07/24  1116 01/16/24  1430 10/11/23  1425 10/04/23  1234 09/07/23  1239 08/01/23  1508 05/24/23  1552 05/10/23  1217 03/21/23  1557 03/14/23  1657 02/11/22  1552 12/09/21  1033 09/09/21  1316 06/04/21  1422 03/03/21  1331   A1C 8.4*  --  8.9*  --  9.6*  --   --   --   --   --  7.3*   < > 6.3*  --  7.1* 8.0*   LDL 30  --   --   --   --   --   --   --   --   --  22  --  16  --   --   --    HDL 75  --   --   --   --   --   --   --   --   --  42*  --  59  --   --   --    TRIG 117  --   --   --   --   --   --   --   --   --  90  --  145  --   --   --    ALT  --   --  10  --   --   --  12  --  13   < >  16   < > 17  --  25  --    CR 1.02* 0.95 0.89   < >  --    < > 0.99*   < > 0.95   < > 0.76   < > 1.05*   < > 1.11* 1.02   GFRESTIMATED 54* 59* 64   < >  --    < > 56*   < > 59*   < > 78   < > 50*   < > 47* 52*   GFRESTBLACK  --   --   --   --   --   --   --   --   --   --   --   --   --   --  54* 60*   POTASSIUM 4.2 3.8  --    < >  --    < > 4.1   < > 4.0   < > 3.8   < >  --    < > 4.0 3.8   TSH 1.85  --   --   --   --   --   --   --   --   --  2.14   < > 2.22  --   --   --     < > = values in this interval not displayed.      Current providers sharing in care for this patient include:  Patient Care Team:  Ilene Tristan MD as PCP - General  Ilene Tristan MD as Assigned PCP  Kirill Polk MD as MD (Otolaryngology)  Aubrey Hurtado MD as MD (Cardiology)  Anderson Perez MD as MD (Clinical Cardiac Electrophysiology)  Radha Rosa MD as MD (Cardiology)  Beatriz Blanco, RN as Nurse Coordinator (Cardiology)  Carmenza Stringer MD as MD (Rheumatology)  Danay Payne, RN as Specialty Care Coordinator (Cardiology)  Sabrina Meek [facility] as Pharmacist (Pharmacist)  Flor Velasquez, RN as Specialty Care Coordinator (Cardiology)  Zulma Lopez MD as MD (Rheumatology)  Reina Betancur MD as MD (Internal Medicine)  Zulma Lopez MD as Referring Physician (Rheumatology)  Zulma Lopez MD as Assigned Rheumatology Provider  Maryjane Tillman MD as Assigned Pulmonology Provider  Thomas Shannon MD as Assigned Musculoskeletal Provider  Sabrina Meek [facility] as Assigned MTM Pharmacist  Suzanne Whitman [facility] as Pharmacist (Pharmacist)  Santy Lee MD as Physician (Ophthalmology)  Mary Dick MD as MD (Endocrinology, Diabetes, and Metabolism)  Vale Nassar MD as Assigned Surgical Provider  Radha Rosa MD as Assigned Heart and Vascular Provider    The following health maintenance items are reviewed in Epic  "and correct as of today:  Health Maintenance   Topic Date Due    RSV VACCINE (Pregnancy & 60+) (1 - 1-dose 60+ series) Never done    ANNUAL REVIEW OF HM ORDERS  09/14/2022    URINE DRUG SCREEN  09/22/2023    DIABETIC FOOT EXAM  04/12/2024    COVID-19 Vaccine (10 - 2023-24 season) 06/25/2024    A1C  07/25/2024    EYE EXAM  08/02/2024    BMP  08/25/2024    PHQ-9  10/30/2024    ALT  01/16/2025    CBC  01/16/2025    HEMOGLOBIN  01/16/2025    LIPID  04/25/2025    MICROALBUMIN  04/25/2025    MEDICARE ANNUAL WELLNESS VISIT  04/30/2025    FALL RISK ASSESSMENT  04/30/2025    HF ACTION PLAN  03/21/2026    DEXA  08/31/2026    ADVANCE CARE PLANNING  04/30/2029    DTAP/TDAP/TD IMMUNIZATION (3 - Td or Tdap) 12/04/2030    COLORECTAL CANCER SCREENING  05/03/2032    TSH W/FREE T4 REFLEX  Completed    SPIROMETRY  Completed    COPD ACTION PLAN  Completed    DEPRESSION ACTION PLAN  Completed    INFLUENZA VACCINE  Completed    Pneumococcal Vaccine: 65+ Years  Completed    URINALYSIS  Completed    ZOSTER IMMUNIZATION  Completed    IPV IMMUNIZATION  Aged Out    HPV IMMUNIZATION  Aged Out    MENINGITIS IMMUNIZATION  Aged Out    RSV MONOCLONAL ANTIBODY  Aged Out    MAMMO SCREENING  Discontinued         Review of Systems  Constitutional, neuro, ENT, endocrine, pulmonary, cardiac, gastrointestinal, genitourinary, musculoskeletal, integument and psychiatric systems are negative, except as otherwise noted.     Objective    Exam  /79   Pulse 56   Temp 98.1  F (36.7  C) (Temporal)   Resp 18   Ht 1.67 m (5' 5.75\")   Wt 82.6 kg (182 lb 3.2 oz)   LMP  (LMP Unknown)   SpO2 93%   BMI 29.63 kg/m     Estimated body mass index is 29.63 kg/m  as calculated from the following:    Height as of this encounter: 1.67 m (5' 5.75\").    Weight as of this encounter: 82.6 kg (182 lb 3.2 oz).    Physical Exam  GENERAL: alert and no distress  EYES: Eyes grossly normal to inspection, PERRL and conjunctivae and sclerae normal  HENT: ear canals and TM's " normal, nose and mouth without ulcers or lesions  NECK: no adenopathy, no asymmetry, masses, or scars  RESP: lungs clear to auscultation - no rales, rhonchi or wheezes  CV: regular rate and rhythm, normal S1 S2, no S3 or S4, no murmur, click or rub, no peripheral edema  ABDOMEN: soft, nontender, no hepatosplenomegaly, no masses and bowel sounds normal  MS: no gross musculoskeletal defects noted, no edema  SKIN: no suspicious lesions or rashes  NEURO: Normal strength and tone, mentation intact and speech normal  PSYCH: mentation appears normal, affect normal/bright        4/30/2024   Mini Cog   Clock Draw Score 2 Normal   3 Item Recall 3 objects recalled   Mini Cog Total Score 5              Signed Electronically by: Ilene Tristan MD    Answers submitted by the patient for this visit:  Patient Health Questionnaire (Submitted on 4/30/2024)  If you checked off any problems, how difficult have these problems made it for you to do your work, take care of things at home, or get along with other people?: Not difficult at all  PHQ9 TOTAL SCORE: 6  DELORES-7 (Submitted on 4/30/2024)  DELORES 7 TOTAL SCORE: 2

## 2024-04-30 NOTE — Clinical Note
Please schedule follow-up appt (clinic) on 7/10/24 at noon appt. Follow-up mdd, asthma, DM II, chronic issues. Thank you! CW

## 2024-04-30 NOTE — PATIENT INSTRUCTIONS
"Recommendations from today's MTM visit:                                                    MTM (medication therapy management) is a service provided by a clinical pharmacist designed to help you get the most of out of your medicines.   Today we reviewed what your medicines are for, how to know if they are working, that your medicines are safe and how to make your medicine regimen as easy as possible.      Start bupropion 150mg nce a day in the morning.   Try an injection of Kinaret to see if that helps with your knee pain  Let Sabrina know if you have more low blood sugars  Signs/symptoms of low blood sugar include (but are not limited to): sweating, shaking, feeling dizzy or weak, extreme hunger, headache, feeling crabby or confused, numbness or tingling of mouth/lips.  If you have these symptoms check your blood sugar if you can and then consume carbohydrate (sugar)  This is a helpful reminder on how to treat a blood sugar if you are low:  If your blood sugar is < 70 mg/dL, consume 15 grams of fast-acting carbohydrate (4 glucose tabs OR 4 oz juice or non-diet pop OR 2 Tbsp dried fruit OR 5-7 lifesavers OR 1 Tbsp sugar) and wait 15 minutes. Check blood sugar again and if not rising, repeat.  If your blood sugar is < 50 mg/dL, consume 30 grams of fast-acting carbohydrate (8 glucose tabs OR 8 oz juice or non-diet pop OR 4 Tbsp dried fruit OR 10-14 lifesavers OR 2 Tbsp sugar) and wait 15 minutes. Check blood sugar again and if not rising, repeat.       Follow-up: In July when you see Dr. Tristan    It was great speaking with you today.  I value your experience and would be very thankful for your time in providing feedback in our clinic survey. In the next few days, you may receive an email or text message from [employer] with a link to a survey related to your  clinical pharmacist.\"     To schedule another MTM appointment, please call the clinic directly or you may call the MTM scheduling line at [phone] or " toll-free at [phone].     My Clinical Pharmacist's contact information:                                                      Please feel free to contact me with any questions or concerns you have.      Yasmine LevineD., M.B.A., BCACP  [facility] Pharmacist, [facility]  Pager: [phone]  Email: [email or web address]

## 2024-04-30 NOTE — NURSING NOTE
Per orders of CW, injection of COVID given by Yvonne Fabian RN. Prior to immunization administration, verified patients identity using patient s name and date of birth. Patient instructed to remain in clinic for 15 minutes afterwards, and to report any adverse reaction to me or clinic staff immediately.    Yvonne Fabian RN on 4/30/2024 at 2:27 PM.    Please see Immunization Activity for additional information.     
General

## 2024-04-30 NOTE — PROGRESS NOTES
Wellness Visit Notes:    -Mammo done 1/28/2014, mammo screening discontinued.  -DEXA done 8/31/2023 (impression: osteoporosis; most negative T-score of -2.6 at L femoral neck).  Next due in 2026.  -Colon cancer screening done via colonoscopy on 5/03/2022 (impression: diverticulosis; no polyp removed). Next due in 2032.   -Immunizations: Pt is due for COVID vaccine and due for RSV vaccine, however advised pt to complete RSV vaccine at local pharmacy given Medicare insurance.   -Education: Pt education provided on {Education List:298981}  -Derm: Does patient regularly see dermatologist? ***  -Refills pended for requested medications

## 2024-05-05 DIAGNOSIS — I50.30 HEART FAILURE WITH PRESERVED EJECTION FRACTION, UNSPECIFIED HF CHRONICITY (H): ICD-10-CM

## 2024-05-05 DIAGNOSIS — I48.21 PERMANENT ATRIAL FIBRILLATION (H): ICD-10-CM

## 2024-05-06 DIAGNOSIS — F33.1 MAJOR DEPRESSIVE DISORDER, RECURRENT EPISODE, MODERATE (H): ICD-10-CM

## 2024-05-06 DIAGNOSIS — M35.02 SJOGREN'S SYNDROME WITH LUNG INVOLVEMENT (H): ICD-10-CM

## 2024-05-06 DIAGNOSIS — J84.9 ILD (INTERSTITIAL LUNG DISEASE) (H): ICD-10-CM

## 2024-05-06 RX ORDER — MONTELUKAST SODIUM 10 MG/1
1 TABLET ORAL AT BEDTIME
Qty: 90 TABLET | Refills: 3 | Status: SHIPPED | OUTPATIENT
Start: 2024-05-06

## 2024-05-06 RX ORDER — ESCITALOPRAM OXALATE 20 MG/1
20 TABLET ORAL DAILY
Qty: 90 TABLET | Refills: 1 | Status: SHIPPED | OUTPATIENT
Start: 2024-05-06

## 2024-05-07 RX ORDER — METOPROLOL SUCCINATE 50 MG/1
50 TABLET, EXTENDED RELEASE ORAL 2 TIMES DAILY
Qty: 180 TABLET | Refills: 3 | Status: SHIPPED | OUTPATIENT
Start: 2024-05-07 | End: 2024-09-12

## 2024-05-07 NOTE — TELEPHONE ENCOUNTER
LVD:  3/7/2024  [facility] Radha Shafer MD  Cardiovascular Disease     Refilled per protocol.

## 2024-05-16 ENCOUNTER — OFFICE VISIT (OUTPATIENT)
Dept: PULMONOLOGY | Facility: CLINIC | Age: 84
End: 2024-05-16
Attending: INTERNAL MEDICINE
Payer: COMMERCIAL

## 2024-05-16 VITALS
SYSTOLIC BLOOD PRESSURE: 152 MMHG | DIASTOLIC BLOOD PRESSURE: 79 MMHG | HEART RATE: 56 BPM | WEIGHT: 182.2 LBS | HEIGHT: 66 IN | BODY MASS INDEX: 29.28 KG/M2 | OXYGEN SATURATION: 95 %

## 2024-05-16 DIAGNOSIS — J44.89 FOLLICULAR BRONCHIOLITIS (H): ICD-10-CM

## 2024-05-16 DIAGNOSIS — J44.89 FOLLICULAR BRONCHIOLITIS (H): Primary | ICD-10-CM

## 2024-05-16 LAB
6 MIN WALK (FT): 200 FT
6 MIN WALK (M): 61 M
DLCOUNC-%PRED-PRE: 89 %
DLCOUNC-PRE: 16.72 ML/MIN/MMHG
DLCOUNC-PRED: 18.76 ML/MIN/MMHG
ERV-%PRED-PRE: 38 %
ERV-PRE: 0.33 L
ERV-PRED: 0.86 L
EXPTIME-PRE: 6.83 SEC
FEF2575-%PRED-PRE: 65 %
FEF2575-PRE: 0.93 L/SEC
FEF2575-PRED: 1.42 L/SEC
FEFMAX-%PRED-PRE: 49 %
FEFMAX-PRE: 2.29 L/SEC
FEFMAX-PRED: 4.62 L/SEC
FEV1-%PRED-PRE: 68 %
FEV1-PRE: 1.24 L
FEV1FEV6-PRE: 74 %
FEV1FEV6-PRED: 77 %
FEV1FVC-PRE: 74 %
FEV1FVC-PRED: 77 %
FEV1SVC-PRE: 82 %
FEV1SVC-PRED: 59 %
FIFMAX-PRE: 2.06 L/SEC
FRCPLETH-%PRED-PRE: 83 %
FRCPLETH-PRE: 2.34 L
FRCPLETH-PRED: 2.81 L
FVC-%PRED-PRE: 71 %
FVC-PRE: 1.68 L
FVC-PRED: 2.36 L
IC-%PRED-PRE: 68 %
IC-PRE: 1.19 L
IC-PRED: 1.73 L
RVPLETH-%PRED-PRE: 85 %
RVPLETH-PRE: 2.02 L
RVPLETH-PRED: 2.36 L
TLCPLETH-%PRED-PRE: 68 %
TLCPLETH-PRE: 3.53 L
TLCPLETH-PRED: 5.19 L
VA-%PRED-PRE: 64 %
VA-PRE: 3 L
VC-%PRED-PRE: 49 %
VC-PRE: 1.52 L
VC-PRED: 3.07 L

## 2024-05-16 PROCEDURE — 94726 PLETHYSMOGRAPHY LUNG VOLUMES: CPT | Performed by: INTERNAL MEDICINE

## 2024-05-16 PROCEDURE — 94729 DIFFUSING CAPACITY: CPT | Performed by: INTERNAL MEDICINE

## 2024-05-16 PROCEDURE — 99214 OFFICE O/P EST MOD 30 MIN: CPT | Mod: 25 | Performed by: INTERNAL MEDICINE

## 2024-05-16 PROCEDURE — 94375 RESPIRATORY FLOW VOLUME LOOP: CPT | Performed by: INTERNAL MEDICINE

## 2024-05-16 PROCEDURE — G0463 HOSPITAL OUTPT CLINIC VISIT: HCPCS | Performed by: INTERNAL MEDICINE

## 2024-05-16 PROCEDURE — 94618 PULMONARY STRESS TESTING: CPT | Performed by: INTERNAL MEDICINE

## 2024-05-16 ASSESSMENT — PAIN SCALES - GENERAL: PAINLEVEL: MILD PAIN (3)

## 2024-05-16 NOTE — NURSING NOTE
Chief Complaint   Patient presents with    RECHECK     Return Interstitial Lung     Medications reviewed and vital signs taken.   Negro Parsons, CMA

## 2024-05-16 NOTE — LETTER
5/16/2024         RE: Betty Tee  [address] [place] [address]        Dear Colleague,    Thank you for referring your patient, Betty Tee, to the [facility] [place]. Please see a copy of my visit note below.    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  She had mild ILD on a previous chest CT scan in 2015, but follow-up chest CT scan in 2018 showed improvement with no obvious ILD present on the CT scan at that time, but the follicular bronchiolitis changes were still present.  She has been on prednisone for her Sjogren's for many years, and that is managed by Dr. Lopez in rheumatology. Her medications for follicular bronchiolitis include current prednisone dose 5 mg daily, azithromycin 250 mg daily, montelukast daily, and Breo Ellipta inhaler 100-25. These are all anti-inflammatories for her follicular bronchiolitis.     Today, Sister Betty is in clinic with with her friend.  Her friend and Sister Betty reports that she was started on bupropion by her primary care provider, and since then she has had no energy.  She felt like she could not make any kind of effort in the PFT lab today.  She also reports a stiff neck for 2 weeks off-and-on, and it hurts to bend her neck.  She also sometimes feels a pressure in her head but denies photophobia, nausea or vomiting.  She is not exercising much or walking.  She moved to a senior apartment.  She denies cough or wheezing.  She does endorse feeling a little winded with activity, but she does not do much physical activity.            Current Outpatient Medications   Medication Sig Dispense Refill    ACCU-CHEK SHANNON PLUS  test strip USE TO TEST BLOOD SUGARS 3 TIMES DAILY 300 strip 0    acyclovir (ZOVIRAX) 400 MG tablet Take 1 tablet (400 mg) by mouth every 8 hours For a couple days 15 tablet 2    acyclovir (ZOVIRAX) 5 % external ointment Apply topically as needed (6 times day PRN for outbreaks) As needed for outbreaks 15 g 3    albuterol (PROAIR HFA/PROVENTIL HFA/VENTOLIN HFA) 108 (90 Base) MCG/ACT inhaler Inhale 2 puffs into the lungs every 6 hours as needed for wheezing or shortness of breath      alendronate (FOSAMAX) 70 MG tablet Take 1 tablet (70 mg) by mouth every 7 days 12 tablet 1    allopurinol (ZYLOPRIM) 100 MG tablet Take 1 tablet (100 mg) by mouth daily 90 tablet 1    anakinra (KINERET) 100 MG/0.67ML SOSY injection Inject 0.67 mLs (100 mg) Subcutaneous daily 20.1 mL 3    aspirin (ASA) 81 MG EC tablet Take 1 tablet (81 mg) by mouth 2 times daily 60 tablet 0    atorvastatin (LIPITOR) 10 MG tablet TAKE 1/2 TABLET BY MOUTH EVERY DAY 45 tablet 3    azithromycin (ZITHROMAX) 250 MG tablet TAKE 1 TABLET BY MOUTH EVERY DAY 30 tablet 5    BD PEN NEEDLE JANE 2ND GEN 32G X 4 MM miscellaneous USE TO TEST 4 TIMES A  each 1    blood glucose (NO BRAND SPECIFIED) lancets standard Use to test blood sugar 3 times daily or as directed 100 Lancet 3    BREO ELLIPTA 100-25 MCG/ACT inhaler TAKE 1 PUFF BY MOUTH EVERY DAY 1 each 11    buPROPion (WELLBUTRIN XL) 150 MG 24 hr tablet Take 1 tablet (150 mg) by mouth every morning 90 tablet 0    cevimeline (EVOXAC) 30 MG capsule Take 1 capsule (30 mg) by mouth 3 times daily as needed      Cholecalciferol (VITAMIN D3) 50 MCG (2000 UT) CAPS TAKE 100 MCG BY MOUTH DAILY (TAKE 2 TABLET (50 MCG) BY MOUTH DAILY - ORAL) 180 capsule 2    COMPOUNDED NON-CONTROLLED SUBSTANCE (CMPD RX) - PHARMACY TO MIX COMPOUNDED MEDICATION Estriol 1 mg/g Apply small amount to finger and apply to inside vagina daily for 2 weeks then twice weekly Route: vaginally Dispense 30 grams 11 refills 30 g 11    cyanocobalamin  (VITAMIN B-12) 1000 MCG tablet Take 1 tablet (1,000 mcg) by mouth three times a week      escitalopram (LEXAPRO) 20 MG tablet Take 1 tablet (20 mg) by mouth daily 90 tablet 1    ferrous gluconate (FERGON) 324 (38 Fe) MG tablet Take 1 tablet (324 mg) by mouth three times a week      fluticasone (FLONASE) 50 MCG/ACT nasal spray SPRAY 1-2 SPRAYS INTO BOTH NOSTRILS DAILY AS NEEDED FOR ALLERGIES 16 mL 11    hydroxychloroquine (PLAQUENIL) 200 MG tablet Take 1 tablet (200 mg) by mouth daily Get annual eye exams for hydroxychloroquine (Plaquenil) monitoring and fax to [phone] 90 tablet 1    insulin glargine (LANTUS PEN) 100 UNIT/ML pen Inject 20 Units Subcutaneous daily      insulin lispro (HUMALOG KWIKPEN) 100 UNIT/ML (1 unit dial) KWIKPEN Inject Subcutaneous 3 times daily (before meals) Sliding scale insulin; tests BG three times day  If BG <150, no insulin given   If -200 take 2 units  If -250 take 4 units  If -300 take 6 units  If -350 take 10 units  If BG >350 take 14 units      ketoconazole (NIZORAL) 2 % external cream Apply topically 2 times daily as needed for itching 60 g 1    levocetirizine (XYZAL) 5 MG tablet TAKE 1 TABLET BY MOUTH EVERY EVENING 90 tablet 1    lidocaine (LIDODERM) 5 % patch APPLY PATCH TO PAINFUL AREA FOR UP TO 12 H WITHIN A 24 H PERIOD. REMOVE AFTER 12 HOURS. (Patient taking differently: Apply patch to painful area for up to 12 h within a 24 h period.  Remove after 12 hours.) 30 patch 2    loratadine (CLARITIN) 10 MG tablet TAKE 1 TABLET BY MOUTH EVERY DAY 90 tablet 3    methocarbamol (ROBAXIN) 750 MG tablet Take 1 tablet (750 mg) by mouth 3 times daily as needed for muscle spasms 90 tablet 3    metoprolol succinate ER (TOPROL XL) 50 MG 24 hr tablet TAKE 1 TABLET BY MOUTH TWICE A  tablet 3    mirabegron (MYRBETRIQ) 25 MG 24 hr tablet Take 1 tablet (25 mg) by mouth daily 30 tablet 11    montelukast (SINGULAIR) 10 MG tablet TAKE 1 TABLET BY MOUTH EVERYDAY AT  BEDTIME 90 tablet 3    pantoprazole (PROTONIX) 40 MG EC tablet Take 1 tablet (40 mg) by mouth daily (replaces famotidine- should stop taking famotidine) 90 tablet 0    polyethylene glycol (MIRALAX) 17 g packet Take 17 g by mouth daily 10 packet 0    potassium chloride ER (KLOR-CON) 20 MEQ CR tablet Take 2 tablets (40 mEq) by mouth every morning AND 1 tablet (20 mEq) every evening. 270 tablet 3    predniSONE (DELTASONE) 10 MG tablet For gout flares, take the prednisone as 40-30-20-10 mg a day, each course x 3 days then go back to 5 mg a day 30 tablet 3    predniSONE (DELTASONE) 5 MG tablet Take 1 tablet (5 mg) by mouth daily 90 tablet 1    Semaglutide, 2 MG/DOSE, (OZEMPIC) 8 MG/3ML pen Inject 2 mg Subcutaneous every 7 days 9 mL 3    torsemide (DEMADEX) 20 MG tablet TAKE 2 TABLETS (40 MG) BY MOUTH EVERY MORNING AND 1 TABLET (20 MG) DAILY AT 2 PM. TAKE THE AFTERNOON DOSE WITH AN EXTRA 10 MG TAB FOR A TOTAL OF 30 MG IN THE AFTERNOON 270 tablet 3    traZODone (DESYREL) 50 MG tablet Take 3 tablets (150 mg) by mouth at bedtime 90 tablet 9    acetaminophen (TYLENOL) 500 MG tablet Take 2 tablets (1,000 mg) by mouth every 8 hours as needed (max 6 tablets/24 hours, 2 tablets/dose) (Patient not taking: Reported on 1/30/2024) 60 tablet 0     No current facility-administered medications for this visit.     Allergies   Allergen Reactions    Amoxicillin-Pot Clavulanate Nausea and Vomiting    Amoxicillin-Pot Clavulanate     Codeine Nausea and Vomiting     PN: LW Reaction: HIVES    Penicillins Nausea and Vomiting     PN: LW Reaction: GI Upset    Phenobarbital Itching    Seasonal Allergies      Past Medical History:   Diagnosis Date    Alcohol abuse, in remission     Allergic rhinitis, cause unspecified     allegra helps when she takes it    Antiplatelet or antithrombotic long-term use     Atrial fibrillation (H)     in hosp in 11/11 after surgery w/ fluid overload    Cardiomegaly     LVH on stress echo- cardiac w/u at [facility] ER- neg  CT scan for PE, neg stress echo in 8/06    Chest pain, unspecified     Congestive heart failure (H)     Depressive disorder     Diabetes (H)     Disorder of bone and cartilage, unspecified     osteopenia (had been on prempro), improved on 6/06 dexa, stable dexa 11/10    Diverticulosis of colon (without mention of hemorrhage)     last episode yrs ago    Essential hypertension, benign     Follicular bronchiolitis (H)     associated with Sjogrens, dx by chest CT showing mosaic attenuation and air trapping    Gastro-oesophageal reflux disease     ILD (interstitial lung disease) (H)     associated with Sjogrens, also has mildly elevated IgG4, first noted on chest CT 2015 (mild changes) and also has small airways disease; ILD improved on follow up chest CT 2018.    Insomnia, unspecified     weaned off clonazepam    Irregular heart beat     Lumbago 07/2009    MRI with DJD, now seeing Dr. Cain for sciatic sx's    Major depressive disorder, recurrent episode, moderate (H)     Obstructive sleep apnea     uses cpap    Osteoarthrosis, unspecified whether generalized or localized, unspecified site     Sjogren's syndrome (H24)     + RG and SSA and lip bx    Sleep apnea     uses a cpap machine    Tobacco use disorder     chantix in 9/07, started again in 6/08, working       Past Surgical History:   Procedure Laterality Date    APPENDECTOMY      ARTHROPLASTY HIP Right 5/31/2023    Procedure: Right total hip arthroplasty;  Surgeon: Thomas Shannon MD;  Location:  OR    BACK SURGERY  1962    BACK SURGERY  1962    BIOPSY BREAST  09/27/2002    Biopsy Left Breast    BIOPSY BREAST Left 09/27/2002    BREAST SURGERY      CARDIAC SURGERY      CARDIAC SURGERY      CHOLECYSTECTOMY  1990's?    CHOLECYSTECTOMY      COLECTOMY LEFT  11/07/2011    Procedure:COLECTOMY LEFT; Laparoscopic mobilization of splenic flexture, sigmoid colectomy, coloprotoscopy, loop illeostomy; Surgeon:CK CASTANEDA; Location:[facility] OR    COLECTOMY LEFT  11/07/2011     COLONOSCOPY  1990,s    COLONOSCOPY N/A 5/3/2022    Procedure: COLONOSCOPY;  Surgeon: Guru Winsome Dias MD;  Location: [facility] GI    CV LEFT ATRIAL APPENDAGE CLOSURE N/A 9/26/2022    Procedure: Left Atrial Appendage Closure;  Surgeon: Uzair Crews MD;  Location: [facility] HE[facility] CARDIAC CATH LAB    ENT SURGERY      EYE SURGERY  2012    FLEXIBLE SIGMOIDOSCOPY  11/03/2011    HYSTERECTOMY TOTAL ABDOMINAL, BILATERAL SALPINGO-OOPHORECTOMY, COMBINED  11/07/2011    Procedure:COMBINED HYSTERECTOMY TOTAL ABDOMINAL, BILATERAL SALPINGO-OOPHORECTOMY; total abdominal hysterectomy, bilateral salpingo-oophorectomy; Surgeon:ALETA MANUEL; Location:[facility] OR    HYSTERECTOMY TOTAL ABDOMINAL, BILATERAL SALPINGO-OOPHORECTOMY, COMBINED  11/07/2011    ILEOSTOMY  02/01/2012    takedown loop ileostomy     INSERT STENT URETER  11/07/2011    Procedure:INSERT STENT URETER; Placement of Bilateral Ureteral Stents ; Surgeon:PRANEETH BRYANT; Location:[facility] OR    SIGMOIDECTOMY  02/01/2012    Dr. Castaneda, Sigmoidectomy for diverticular abscess, iliostomy afterwards until repair    SIGMOIDOSCOPY FLEXIBLE  11/03/2011    Procedure:SIGMOIDOSCOPY FLEXIBLE; Flexible Sigmoidoscopy; Surgeon:CK CASTANEDA; Location:[facility] OR    TAKEDOWN ILEOSTOMY  02/01/2012    Procedure:TAKEDOWN ILEOSTOMY; Takedown Loop Ileostomy ; Surgeon:CK CASTANEDA; Location:[facility] OR    URETERAL STENT PLACEMENT  11/07/2011    ZZC APPENDECTOMY  1970's?    ZZC NONSPECIFIC PROCEDURE  11/2005    exploratory abd lap, adhesions, resolved RLQ pain, diverticulitis episodes       Social History     Socioeconomic History    Marital status: Single     Spouse name: Not on file    Number of children: 0    Years of education: Ed Spec De    Highest education level: Not on file   Occupational History    Occupation: Professor     Employer: [employer]     Comment: [employer]- [employer]   Tobacco Use    Smoking status: Former     Current packs/day: 0.00      Average packs/day: 0.5 packs/day for 41.6 years (20.8 ttl pk-yrs)     Types: Cigarettes     Start date:      Quit date: 2011     Years since quittin.8    Smokeless tobacco: Never    Tobacco comments:     1 ppd   Vaping Use    Vaping status: Never Used   Substance and Sexual Activity    Alcohol use: No     Alcohol/week: 0.0 standard drinks of alcohol     Comment: In recovery beginning     Drug use: No    Sexual activity: Never   Other Topics Concern    Parent/sibling w/ CABG, MI or angioplasty before 65F 55M? Yes   Social History Narrative                 Social Determinants of Health     Financial Resource Strain: Low Risk  (2024)    Financial Resource Strain     Within the past 12 months, have you or your family members you live with been unable to get utilities (heat, electricity) when it was really needed?: No   Food Insecurity: Low Risk  (2024)    Food Insecurity     Within the past 12 months, did you worry that your food would run out before you got money to buy more?: No     Within the past 12 months, did the food you bought just not last and you didn t have money to get more?: No   Transportation Needs: Low Risk  (2024)    Transportation Needs     Within the past 12 months, has lack of transportation kept you from medical appointments, getting your medicines, non-medical meetings or appointments, work, or from getting things that you need?: No   Physical Activity: Inactive (2024)    Exercise Vital Sign     Days of Exercise per Week: 0 days     Minutes of Exercise per Session: 0 min   Stress: Stress Concern Present (2024)    [employer] - Occupational Stress Questionnaire     Feeling of Stress : To some extent   Social Connections: Unknown (2024)    Social Connection and Isolation Panel [NHANES]     Frequency of Communication with Friends and Family: Not on file     Frequency of Social Gatherings with Friends and Family: More than  three times a week     Attends Muslim Services: Not on file     Active Member of Clubs or Organizations: Not on file     Attends Club or Organization Meetings: Not on file     Marital Status: Not on file   Interpersonal Safety: Not on file   Housing Stability: Low Risk  (4/30/2024)    Housing Stability     Do you have housing? : Yes     Are you worried about losing your housing?: No       Family History   Problem Relation Age of Onset    C.A.D. Mother 63        MI- first at age 63    Heart Disease Mother     Hypertension Mother     Cerebrovascular Disease Mother     Hyperlipidemia Mother     Coronary Artery Disease Mother         MI-first at age 63    Other - See Comments Mother         cerebrovascular disease     Alcohol/Drug Father     Alzheimer Disease Father     Dementia Father     Hypertension Father     Hyperlipidemia Father     Substance Abuse Father     Diabetes Sister     Hypertension Sister     Hyperlipidemia Sister     Substance Abuse Sister     Asthma Sister     C.A.D. Sister 52        Minor MI- age 50's    Heart Disease Sister     Hypertension Sister     Hypertension Sister     Cancer - colorectal Sister 48        Late 40's early 50's    Gastrointestinal Disease Sister         Diverticulitis    Lipids Sister     Lipids Sister     Diabetes Sister     Heart Disease Sister         CHF    Cancer Sister         lung, smoker    Substance Abuse Sister     Asthma Sister     Cancer Sister     Coronary Artery Disease Sister         minor MI-age 50's    Heart Disease Sister     Hypertension Sister     Hypertension Sister     Colon Cancer Sister     Diverticulitis Sister     Lung Cancer Sister     Heart Disease Sister         CHF    Diabetes Sister     Asthma Sister     Hypertension Brother     Prostate Cancer Brother 74        Dx'd age 74    Gastrointestinal Disease Brother         Diverticulitis    Parkinsonism Brother     Substance Abuse Brother     Prostate Cancer Brother     Hyperlipidemia Brother      "Hypertension Brother     Prostate Cancer Brother     Diverticulitis Brother     Parkinsonism Brother     Breast Cancer Daughter     Breast Cancer Daughter     Diabetes Other     Hypertension Other     Anesthesia Reaction No family hx of     Deep Vein Thrombosis (DVT) No family hx of              Vitals: BP (!) 152/79   Pulse 56   Ht 1.67 m (5' 5.75\")   Wt 82.6 kg (182 lb 3.2 oz)   LMP  (LMP Unknown)   SpO2 95%   BMI 29.63 kg/m      Exam:   GENERAL APPEARANCE: Well developed, well nourished, alert, and in no apparent distress.  RESP: good air flow throughout.  No crackles. No rhonchi. No wheezes.  No inspiratory squeaks.  CV: Normal S1, S2, regular rhythm, normal rate. No murmur.  No LE edema.   MS: extremities normal. No clubbing. No cyanosis.  SKIN: no rash on limited exam.  NEURO: Mentation intact, speech normal.  PSYCH: mentation appears normal. and affect normal/bright  Results:  Recent Results (from the past 168 hour(s))   6 minute walk test    Collection Time: 05/16/24 12:00 AM   Result Value Ref Range    6 min walk (FT) 200 905 ft    6 Min Walk (M) 61 276 m   General PFT Lab (Please always keep checked)    Collection Time: 05/16/24 10:33 AM   Result Value Ref Range    FVC-Pred 2.36 L    FVC-Pre 1.68 L    FVC-%Pred-Pre 71 %    FEV1-Pre 1.24 L    FEV1-%Pred-Pre 68 %    FEV1FVC-Pred 77 %    FEV1FVC-Pre 74 %    FEFMax-Pred 4.62 L/sec    FEFMax-Pre 2.29 L/sec    FEFMax-%Pred-Pre 49 %    FEF2575-Pred 1.42 L/sec    FEF2575-Pre 0.93 L/sec    MLA1802-%Pred-Pre 65 %    ExpTime-Pre 6.83 sec    FIFMax-Pre 2.06 L/sec    VC-Pred 3.07 L    VC-Pre 1.52 L    VC-%Pred-Pre 49 %    IC-Pred 1.73 L    IC-Pre 1.19 L    IC-%Pred-Pre 68 %    ERV-Pred 0.86 L    ERV-Pre 0.33 L    ERV-%Pred-Pre 38 %    FEV1FEV6-Pred 77 %    FEV1FEV6-Pre 74 %    FRCPleth-Pred 2.81 L    FRCPleth-Pre 2.34 L    FRCPleth-%Pred-Pre 83 %    RVPleth-Pred 2.36 L    RVPleth-Pre 2.02 L    RVPleth-%Pred-Pre 85 %    TLCPleth-Pred 5.19 L    TLCPleth-Pre 3.53 L "    TLCPleth-%Pred-Pre 68 %    DLCOunc-Pred 18.76 ml/min/mmHg    DLCOunc-Pre 16.72 ml/min/mmHg    DLCOunc-%Pred-Pre 89 %    VA-Pre 3.00 L    VA-%Pred-Pre 64 %    FEV1SVC-Pred 59 %    FEV1SVC-Pre 82 %     I reviewed pulmonary function test that was performed today.  However, testing did not meet ATS criteria.  Sister Betty reports that she was unable to make an effort during the testing.  The results show moderate restriction with normal diffusion.  I also reviewed 6-minute walk test, and again results should be reviewed with caution because patient felt like she had no energy since starting the bupropion.  Walk distance is reduced, and there is no exertional hypoxemia on room air.    I reviewed results with the patient.          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   1.  Follicular bronchiolitis associated with Sjogren's.  PFT is not reliable today because testing does not meet ATS criteria.  It is difficult to assess her symptoms or her PFT as she feels like she has no energy since starting bupropion.  I encouraged her to talk with her primary care provider about the medication.  She is going to try to be more physically active at home.  She will continue medications for her follicular bronchiolitis including prednisone 5 mg daily, azithromycin 250 mg daily, and montelukast 10 mg daily.  She also takes Breo Ellipta inhaler 100-25.  She will return in 3 months with full PFT and 6-minute walk test.  2.  Stiff neck.  I recommended that she see her primary care provider about the stiff neck which has been going on for 2 weeks.  The longitudinal plan of care for the diagnosis(es)/condition(s) as documented were addressed during this visit. Due to the added complexity in care, I will continue to support Betty in the subsequent management and with ongoing continuity of care.  I spent 38 minutes reviewing chart,  reviewing test results, talking with and examining patient, formulating plan, and documentation on the day of the encounter.      Again, thank you for allowing me to participate in the care of your patient.        Sincerely,        Maryjane Tillman MD

## 2024-05-16 NOTE — PROGRESS NOTES
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  She had mild ILD on a previous chest CT scan in 2015, but follow-up chest CT scan in 2018 showed improvement with no obvious ILD present on the CT scan at that time, but the follicular bronchiolitis changes were still present.  She has been on prednisone for her Sjogren's for many years, and that is managed by Dr. Lopez in rheumatology. Her medications for follicular bronchiolitis include current prednisone dose 5 mg daily, azithromycin 250 mg daily, montelukast daily, and Breo Ellipta inhaler 100-25. These are all anti-inflammatories for her follicular bronchiolitis.     Today, Sister Betty is in clinic with with her friend.  Her friend and Sister Betty reports that she was started on bupropion by her primary care provider, and since then she has had no energy.  She felt like she could not make any kind of effort in the PFT lab today.  She also reports a stiff neck for 2 weeks off-and-on, and it hurts to bend her neck.  She also sometimes feels a pressure in her head but denies photophobia, nausea or vomiting.  She is not exercising much or walking.  She moved to a senior apartment.  She denies cough or wheezing.  She does endorse feeling a little winded with activity, but she does not do much physical activity.            Current Outpatient Medications   Medication Sig Dispense Refill    ACCU-CHEK SHANNON PLUS test strip USE TO TEST BLOOD SUGARS 3 TIMES DAILY 300 strip 0    acyclovir (ZOVIRAX) 400 MG tablet Take 1 tablet (400 mg) by mouth every 8 hours For a couple days 15 tablet 2    acyclovir (ZOVIRAX) 5 % external ointment Apply topically as needed (6 times day PRN for outbreaks) As needed for  outbreaks 15 g 3    albuterol (PROAIR HFA/PROVENTIL HFA/VENTOLIN HFA) 108 (90 Base) MCG/ACT inhaler Inhale 2 puffs into the lungs every 6 hours as needed for wheezing or shortness of breath      alendronate (FOSAMAX) 70 MG tablet Take 1 tablet (70 mg) by mouth every 7 days 12 tablet 1    allopurinol (ZYLOPRIM) 100 MG tablet Take 1 tablet (100 mg) by mouth daily 90 tablet 1    anakinra (KINERET) 100 MG/0.67ML SOSY injection Inject 0.67 mLs (100 mg) Subcutaneous daily 20.1 mL 3    aspirin (ASA) 81 MG EC tablet Take 1 tablet (81 mg) by mouth 2 times daily 60 tablet 0    atorvastatin (LIPITOR) 10 MG tablet TAKE 1/2 TABLET BY MOUTH EVERY DAY 45 tablet 3    azithromycin (ZITHROMAX) 250 MG tablet TAKE 1 TABLET BY MOUTH EVERY DAY 30 tablet 5    BD PEN NEEDLE JANE 2ND GEN 32G X 4 MM miscellaneous USE TO TEST 4 TIMES A  each 1    blood glucose (NO BRAND SPECIFIED) lancets standard Use to test blood sugar 3 times daily or as directed 100 Lancet 3    BREO ELLIPTA 100-25 MCG/ACT inhaler TAKE 1 PUFF BY MOUTH EVERY DAY 1 each 11    buPROPion (WELLBUTRIN XL) 150 MG 24 hr tablet Take 1 tablet (150 mg) by mouth every morning 90 tablet 0    cevimeline (EVOXAC) 30 MG capsule Take 1 capsule (30 mg) by mouth 3 times daily as needed      Cholecalciferol (VITAMIN D3) 50 MCG (2000 UT) CAPS TAKE 100 MCG BY MOUTH DAILY (TAKE 2 TABLET (50 MCG) BY MOUTH DAILY - ORAL) 180 capsule 2    COMPOUNDED NON-CONTROLLED SUBSTANCE (CMPD RX) - PHARMACY TO MIX COMPOUNDED MEDICATION Estriol 1 mg/g Apply small amount to finger and apply to inside vagina daily for 2 weeks then twice weekly Route: vaginally Dispense 30 grams 11 refills 30 g 11    cyanocobalamin (VITAMIN B-12) 1000 MCG tablet Take 1 tablet (1,000 mcg) by mouth three times a week      escitalopram (LEXAPRO) 20 MG tablet Take 1 tablet (20 mg) by mouth daily 90 tablet 1    ferrous gluconate (FERGON) 324 (38 Fe) MG tablet Take 1 tablet (324 mg) by mouth three times a week      fluticasone  (FLONASE) 50 MCG/ACT nasal spray SPRAY 1-2 SPRAYS INTO BOTH NOSTRILS DAILY AS NEEDED FOR ALLERGIES 16 mL 11    hydroxychloroquine (PLAQUENIL) 200 MG tablet Take 1 tablet (200 mg) by mouth daily Get annual eye exams for hydroxychloroquine (Plaquenil) monitoring and fax to [phone] 90 tablet 1    insulin glargine (LANTUS PEN) 100 UNIT/ML pen Inject 20 Units Subcutaneous daily      insulin lispro (HUMALOG KWIKPEN) 100 UNIT/ML (1 unit dial) KWIKPEN Inject Subcutaneous 3 times daily (before meals) Sliding scale insulin; tests BG three times day  If BG <150, no insulin given   If -200 take 2 units  If -250 take 4 units  If -300 take 6 units  If -350 take 10 units  If BG >350 take 14 units      ketoconazole (NIZORAL) 2 % external cream Apply topically 2 times daily as needed for itching 60 g 1    levocetirizine (XYZAL) 5 MG tablet TAKE 1 TABLET BY MOUTH EVERY EVENING 90 tablet 1    lidocaine (LIDODERM) 5 % patch APPLY PATCH TO PAINFUL AREA FOR UP TO 12 H WITHIN A 24 H PERIOD. REMOVE AFTER 12 HOURS. (Patient taking differently: Apply patch to painful area for up to 12 h within a 24 h period.  Remove after 12 hours.) 30 patch 2    loratadine (CLARITIN) 10 MG tablet TAKE 1 TABLET BY MOUTH EVERY DAY 90 tablet 3    methocarbamol (ROBAXIN) 750 MG tablet Take 1 tablet (750 mg) by mouth 3 times daily as needed for muscle spasms 90 tablet 3    metoprolol succinate ER (TOPROL XL) 50 MG 24 hr tablet TAKE 1 TABLET BY MOUTH TWICE A  tablet 3    mirabegron (MYRBETRIQ) 25 MG 24 hr tablet Take 1 tablet (25 mg) by mouth daily 30 tablet 11    montelukast (SINGULAIR) 10 MG tablet TAKE 1 TABLET BY MOUTH EVERYDAY AT BEDTIME 90 tablet 3    pantoprazole (PROTONIX) 40 MG EC tablet Take 1 tablet (40 mg) by mouth daily (replaces famotidine- should stop taking famotidine) 90 tablet 0    polyethylene glycol (MIRALAX) 17 g packet Take 17 g by mouth daily 10 packet 0    potassium chloride ER (KLOR-CON) 20 MEQ CR  tablet Take 2 tablets (40 mEq) by mouth every morning AND 1 tablet (20 mEq) every evening. 270 tablet 3    predniSONE (DELTASONE) 10 MG tablet For gout flares, take the prednisone as 40-30-20-10 mg a day, each course x 3 days then go back to 5 mg a day 30 tablet 3    predniSONE (DELTASONE) 5 MG tablet Take 1 tablet (5 mg) by mouth daily 90 tablet 1    Semaglutide, 2 MG/DOSE, (OZEMPIC) 8 MG/3ML pen Inject 2 mg Subcutaneous every 7 days 9 mL 3    torsemide (DEMADEX) 20 MG tablet TAKE 2 TABLETS (40 MG) BY MOUTH EVERY MORNING AND 1 TABLET (20 MG) DAILY AT 2 PM. TAKE THE AFTERNOON DOSE WITH AN EXTRA 10 MG TAB FOR A TOTAL OF 30 MG IN THE AFTERNOON 270 tablet 3    traZODone (DESYREL) 50 MG tablet Take 3 tablets (150 mg) by mouth at bedtime 90 tablet 9    acetaminophen (TYLENOL) 500 MG tablet Take 2 tablets (1,000 mg) by mouth every 8 hours as needed (max 6 tablets/24 hours, 2 tablets/dose) (Patient not taking: Reported on 1/30/2024) 60 tablet 0     No current facility-administered medications for this visit.     Allergies   Allergen Reactions    Amoxicillin-Pot Clavulanate Nausea and Vomiting    Amoxicillin-Pot Clavulanate     Codeine Nausea and Vomiting     PN: LW Reaction: HIVES    Penicillins Nausea and Vomiting     PN: LW Reaction: GI Upset    Phenobarbital Itching    Seasonal Allergies      Past Medical History:   Diagnosis Date    Alcohol abuse, in remission     Allergic rhinitis, cause unspecified     allegra helps when she takes it    Antiplatelet or antithrombotic long-term use     Atrial fibrillation (H)     in hosp in 11/11 after surgery w/ fluid overload    Cardiomegaly     LVH on stress echo- cardiac w/u at [facility] ER- neg CT scan for PE, neg stress echo in 8/06    Chest pain, unspecified     Congestive heart failure (H)     Depressive disorder     Diabetes (H)     Disorder of bone and cartilage, unspecified     osteopenia (had been on prempro), improved on 6/06 dexa, stable dexa 11/10    Diverticulosis of colon  (without mention of hemorrhage)     last episode yrs ago    Essential hypertension, benign     Follicular bronchiolitis (H)     associated with Sjogrens, dx by chest CT showing mosaic attenuation and air trapping    Gastro-oesophageal reflux disease     ILD (interstitial lung disease) (H)     associated with Sjogrens, also has mildly elevated IgG4, first noted on chest CT 2015 (mild changes) and also has small airways disease; ILD improved on follow up chest CT 2018.    Insomnia, unspecified     weaned off clonazepam    Irregular heart beat     Lumbago 07/2009    MRI with DJD, now seeing Dr. Cain for sciatic sx's    Major depressive disorder, recurrent episode, moderate (H)     Obstructive sleep apnea     uses cpap    Osteoarthrosis, unspecified whether generalized or localized, unspecified site     Sjogren's syndrome (H24)     + RG and SSA and lip bx    Sleep apnea     uses a cpap machine    Tobacco use disorder     chantix in 9/07, started again in 6/08, working       Past Surgical History:   Procedure Laterality Date    APPENDECTOMY      ARTHROPLASTY HIP Right 5/31/2023    Procedure: Right total hip arthroplasty;  Surgeon: Thomas Shannon MD;  Location:  OR    BACK SURGERY  1962    BACK SURGERY  1962    BIOPSY BREAST  09/27/2002    Biopsy Left Breast    BIOPSY BREAST Left 09/27/2002    BREAST SURGERY      CARDIAC SURGERY      CARDIAC SURGERY      CHOLECYSTECTOMY  1990's?    CHOLECYSTECTOMY      COLECTOMY LEFT  11/07/2011    Procedure:COLECTOMY LEFT; Laparoscopic mobilization of splenic flexture, sigmoid colectomy, coloprotoscopy, loop illeostomy; Surgeon:CK CASTANEDA; Location: OR    COLECTOMY LEFT  11/07/2011    COLONOSCOPY  1990,s    COLONOSCOPY N/A 5/3/2022    Procedure: COLONOSCOPY;  Surgeon: Guru Winsome Dias MD;  Location:  GI    CV LEFT ATRIAL APPENDAGE CLOSURE N/A 9/26/2022    Procedure: Left Atrial Appendage Closure;  Surgeon: Uzair Crews MD;  Location:  HEART  CARDIAC CATH LAB    ENT SURGERY      EYE SURGERY      FLEXIBLE SIGMOIDOSCOPY  2011    HYSTERECTOMY TOTAL ABDOMINAL, BILATERAL SALPINGO-OOPHORECTOMY, COMBINED  2011    Procedure:COMBINED HYSTERECTOMY TOTAL ABDOMINAL, BILATERAL SALPINGO-OOPHORECTOMY; total abdominal hysterectomy, bilateral salpingo-oophorectomy; Surgeon:ALETA MANUEL; Location:[facility] OR    HYSTERECTOMY TOTAL ABDOMINAL, BILATERAL SALPINGO-OOPHORECTOMY, COMBINED  2011    ILEOSTOMY  2012    takedown loop ileostomy     INSERT STENT URETER  2011    Procedure:INSERT STENT URETER; Placement of Bilateral Ureteral Stents ; Surgeon:PRANEETH BRYANT; Location:[facility] OR    SIGMOIDECTOMY  2012    Dr. Siddiqi, Sigmoidectomy for diverticular abscess, iliostomy afterwards until repair    SIGMOIDOSCOPY FLEXIBLE  2011    Procedure:SIGMOIDOSCOPY FLEXIBLE; Flexible Sigmoidoscopy; Surgeon:CK SIDDIQI; Location:[facility] OR    TAKEDOWN ILEOSTOMY  2012    Procedure:TAKEDOWN ILEOSTOMY; Takedown Loop Ileostomy ; Surgeon:CK SIDDIQI; Location:[facility] OR    URETERAL STENT PLACEMENT  2011    ZZC APPENDECTOMY  1970's?    ZZC NONSPECIFIC PROCEDURE  2005    exploratory abd lap, adhesions, resolved RLQ pain, diverticulitis episodes       Social History     Socioeconomic History    Marital status: Single     Spouse name: Not on file    Number of children: 0    Years of education: Ed Spec De    Highest education level: Not on file   Occupational History    Occupation: Professor     Employer: [employer]     Comment: [employer]- Education   Tobacco Use    Smoking status: Former     Current packs/day: 0.00     Average packs/day: 0.5 packs/day for 41.6 years (20.8 ttl pk-yrs)     Types: Cigarettes     Start date:      Quit date: 2011     Years since quittin.8    Smokeless tobacco: Never    Tobacco comments:      ppd   Vaping Use    Vaping status: Never Used   Substance and Sexual  Activity    Alcohol use: No     Alcohol/week: 0.0 standard drinks of alcohol     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    Drug use: No    Sexual activity: Never   Other Topics Concern    Parent/sibling w/ CABG, MI or angioplasty before 65F 55M? Yes   Social History Narrative                 Social Determinants of Health     Financial Resource Strain: Low Risk  (4/30/2024)    Financial Resource Strain     Within the past 12 months, have you or your family members you live with been unable to get utilities (heat, electricity) when it was really needed?: No   Food Insecurity: Low Risk  (4/30/2024)    Food Insecurity     Within the past 12 months, did you worry that your food would run out before you got money to buy more?: No     Within the past 12 months, did the food you bought just not last and you didn t have money to get more?: No   Transportation Needs: Low Risk  (4/30/2024)    Transportation Needs     Within the past 12 months, has lack of transportation kept you from medical appointments, getting your medicines, non-medical meetings or appointments, work, or from getting things that you need?: No   Physical Activity: Inactive (4/30/2024)    Exercise Vital Sign     Days of Exercise per Week: 0 days     Minutes of Exercise per Session: 0 min   Stress: Stress Concern Present (4/30/2024)    [employer] - Occupational Stress Questionnaire     Feeling of Stress : To some extent   Social Connections: Unknown (4/30/2024)    Social Connection and Isolation Panel [NHANES]     Frequency of Communication with Friends and Family: Not on file     Frequency of Social Gatherings with Friends and Family: More than three times a week     Attends Latter day Services: Not on file     Active Member of Clubs or Organizations: Not on file     Attends Club or Organization Meetings: Not on file     Marital Status: Not on file   Interpersonal Safety: Not on file   Housing Stability: Low Risk  (4/30/2024)     Housing Stability     Do you have housing? : Yes     Are you worried about losing your housing?: No       Family History   Problem Relation Age of Onset    C.A.D. Mother 63        MI- first at age 63    Heart Disease Mother     Hypertension Mother     Cerebrovascular Disease Mother     Hyperlipidemia Mother     Coronary Artery Disease Mother         MI-first at age 63    Other - See Comments Mother         cerebrovascular disease     Alcohol/Drug Father     Alzheimer Disease Father     Dementia Father     Hypertension Father     Hyperlipidemia Father     Substance Abuse Father     Diabetes Sister     Hypertension Sister     Hyperlipidemia Sister     Substance Abuse Sister     Asthma Sister     C.A.D. Sister 52        Minor MI- age 50's    Heart Disease Sister     Hypertension Sister     Hypertension Sister     Cancer - colorectal Sister 48        Late 40's early 50's    Gastrointestinal Disease Sister         Diverticulitis    Lipids Sister     Lipids Sister     Diabetes Sister     Heart Disease Sister         CHF    Cancer Sister         lung, smoker    Substance Abuse Sister     Asthma Sister     Cancer Sister     Coronary Artery Disease Sister         minor MI-age 50's    Heart Disease Sister     Hypertension Sister     Hypertension Sister     Colon Cancer Sister     Diverticulitis Sister     Lung Cancer Sister     Heart Disease Sister         CHF    Diabetes Sister     Asthma Sister     Hypertension Brother     Prostate Cancer Brother 74        Dx'd age 74    Gastrointestinal Disease Brother         Diverticulitis    Parkinsonism Brother     Substance Abuse Brother     Prostate Cancer Brother     Hyperlipidemia Brother     Hypertension Brother     Prostate Cancer Brother     Diverticulitis Brother     Parkinsonism Brother     Breast Cancer Daughter     Breast Cancer Daughter     Diabetes Other     Hypertension Other     Anesthesia Reaction No family hx of     Deep Vein Thrombosis (DVT) No family hx of   "            Vitals: BP (!) 152/79   Pulse 56   Ht 1.67 m (5' 5.75\")   Wt 82.6 kg (182 lb 3.2 oz)   LMP  (LMP Unknown)   SpO2 95%   BMI 29.63 kg/m      Exam:   GENERAL APPEARANCE: Well developed, well nourished, alert, and in no apparent distress.  RESP: good air flow throughout.  No crackles. No rhonchi. No wheezes.  No inspiratory squeaks.  CV: Normal S1, S2, regular rhythm, normal rate. No murmur.  No LE edema.   MS: extremities normal. No clubbing. No cyanosis.  SKIN: no rash on limited exam.  NEURO: Mentation intact, speech normal.  PSYCH: mentation appears normal. and affect normal/bright.      Results:  Recent Results (from the past 168 hour(s))   6 minute walk test    Collection Time: 05/16/24 12:00 AM   Result Value Ref Range    6 min walk (FT) 200 905 ft    6 Min Walk (M) 61 276 m   General PFT Lab (Please always keep checked)    Collection Time: 05/16/24 10:33 AM   Result Value Ref Range    FVC-Pred 2.36 L    FVC-Pre 1.68 L    FVC-%Pred-Pre 71 %    FEV1-Pre 1.24 L    FEV1-%Pred-Pre 68 %    FEV1FVC-Pred 77 %    FEV1FVC-Pre 74 %    FEFMax-Pred 4.62 L/sec    FEFMax-Pre 2.29 L/sec    FEFMax-%Pred-Pre 49 %    FEF2575-Pred 1.42 L/sec    FEF2575-Pre 0.93 L/sec    JFG9980-%Pred-Pre 65 %    ExpTime-Pre 6.83 sec    FIFMax-Pre 2.06 L/sec    VC-Pred 3.07 L    VC-Pre 1.52 L    VC-%Pred-Pre 49 %    IC-Pred 1.73 L    IC-Pre 1.19 L    IC-%Pred-Pre 68 %    ERV-Pred 0.86 L    ERV-Pre 0.33 L    ERV-%Pred-Pre 38 %    FEV1FEV6-Pred 77 %    FEV1FEV6-Pre 74 %    FRCPleth-Pred 2.81 L    FRCPleth-Pre 2.34 L    FRCPleth-%Pred-Pre 83 %    RVPleth-Pred 2.36 L    RVPleth-Pre 2.02 L    RVPleth-%Pred-Pre 85 %    TLCPleth-Pred 5.19 L    TLCPleth-Pre 3.53 L    TLCPleth-%Pred-Pre 68 %    DLCOunc-Pred 18.76 ml/min/mmHg    DLCOunc-Pre 16.72 ml/min/mmHg    DLCOunc-%Pred-Pre 89 %    VA-Pre 3.00 L    VA-%Pred-Pre 64 %    FEV1SVC-Pred 59 %    FEV1SVC-Pre 82 %     I reviewed pulmonary function test that was performed today.  However, " testing did not meet ATS criteria.  Sister Betty reports that she was unable to make an effort during the testing.  The results show moderate restriction with normal diffusion.  I also reviewed 6-minute walk test, and again results should be reviewed with caution because patient felt like she had no energy since starting the bupropion.  Walk distance is reduced, and there is no exertional hypoxemia on room air.    I reviewed results with the patient.          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   1.  Follicular bronchiolitis associated with Sjogren's.  PFT is not reliable today because testing does not meet ATS criteria.  It is difficult to assess her symptoms or her PFT as she feels like she has no energy since starting bupropion.  I encouraged her to talk with her primary care provider about the medication.  She is going to try to be more physically active at home.  She will continue medications for her follicular bronchiolitis including prednisone 5 mg daily, azithromycin 250 mg daily, and montelukast 10 mg daily.  She also takes Breo Ellipta inhaler 100-25.  She will return in 3 months with full PFT and 6-minute walk test.  2.  Stiff neck.  I recommended that she see her primary care provider about the stiff neck which has been going on for 2 weeks.  The longitudinal plan of care for the diagnosis(es)/condition(s) as documented were addressed during this visit. Due to the added complexity in care, I will continue to support Betty in the subsequent management and with ongoing continuity of care.  I spent 38 minutes reviewing chart, reviewing test results, talking with and examining patient, formulating plan, and documentation on the day of the encounter.

## 2024-05-17 ENCOUNTER — TELEPHONE (OUTPATIENT)
Dept: FAMILY MEDICINE | Facility: CLINIC | Age: 84
End: 2024-05-17

## 2024-05-17 NOTE — TELEPHONE ENCOUNTER
Left message for Lorri to call clinic back.  Appointment this afternoon on hold if able to make it.  Sandra ANTUNEZ RN

## 2024-05-17 NOTE — TELEPHONE ENCOUNTER
Reason for Call:  Appointment Request    Patient requesting this type of appt:  Office Visit     Requested provider: Ilene Tristan    Reason patient unable to be scheduled: Not within requested timeframe    When does patient want to be seen/preferred time:  ASAP    Comments: Stiff neck and discuss medication -- was seen at [facility] 5/16 and was told to be seen by PCP     Could we send this information to you in [facility] or would you prefer to receive a phone call?:   Patient would prefer a phone call   Okay to leave a detailed message?: Yes at Work number on file: Lorri Carlson (listed on [facility])  [phone] (work)    Call taken on 5/17/2024 at 8:43 AM by Tamiko Hoskins

## 2024-05-17 NOTE — TELEPHONE ENCOUNTER
If she can get her by 2:15pm I could switch my 2:30 virtual to see her in the clinic.... Can you block it and see if we can reach her to see if she can come?  Only other option is Wed last appt, can switch that to clinic visit as well.    Also, given the notes from pulmonary and her extreme fatigue on the wellbutrin, would have her wean down and stop that med (wean- take tab every other day for a week then stop).    Thanks!  CW

## 2024-05-21 NOTE — TELEPHONE ENCOUNTER
JANA,    Spoke with Nghia.  Went to see Dr. Tillman last week relating to breathing issues.  Had neck ache at same time as appt- Dr. Tillman advised checking with you.  Over weekend, patient gave herself injections of some sort prescribed by Dr. Lopez for gout.  Neck is no longer bothering her, so does not feel like she needs to see you for this.    However, mental health is still a concern.  Patient has stopped taking Wellbutrin- per Nghia, when patient is with a group of people having a conversation, taking wellbutrin makes her feel like she is not even there.  Does not wish to take it anymore, but Nghia states she knows patient is still struggling with this.  RN advised follow-up visit to help address mental health further.  Scheduled for virtual visit with you tomorrow.    Sandra ANTUNEZ RN

## 2024-05-22 ENCOUNTER — VIRTUAL VISIT (OUTPATIENT)
Dept: FAMILY MEDICINE | Facility: CLINIC | Age: 84
End: 2024-05-22
Payer: COMMERCIAL

## 2024-05-22 DIAGNOSIS — I50.32 CHRONIC HEART FAILURE WITH PRESERVED EJECTION FRACTION (H): ICD-10-CM

## 2024-05-22 DIAGNOSIS — Z96.641 S/P HIP REPLACEMENT, RIGHT: ICD-10-CM

## 2024-05-22 DIAGNOSIS — I10 ESSENTIAL HYPERTENSION WITH GOAL BLOOD PRESSURE LESS THAN 140/90: ICD-10-CM

## 2024-05-22 DIAGNOSIS — E55.9 VITAMIN D DEFICIENCY: ICD-10-CM

## 2024-05-22 DIAGNOSIS — M85.89 OSTEOPENIA OF MULTIPLE SITES: ICD-10-CM

## 2024-05-22 DIAGNOSIS — J31.0 CHRONIC RHINITIS: ICD-10-CM

## 2024-05-22 DIAGNOSIS — E53.8 VITAMIN B12 DEFICIENCY (NON ANEMIC): ICD-10-CM

## 2024-05-22 DIAGNOSIS — D50.9 IRON DEFICIENCY ANEMIA, UNSPECIFIED IRON DEFICIENCY ANEMIA TYPE: ICD-10-CM

## 2024-05-22 DIAGNOSIS — R53.83 OTHER FATIGUE: ICD-10-CM

## 2024-05-22 DIAGNOSIS — M19.90 INFLAMMATORY ARTHRITIS: ICD-10-CM

## 2024-05-22 DIAGNOSIS — M10.9 GOUT, UNSPECIFIED CAUSE, UNSPECIFIED CHRONICITY, UNSPECIFIED SITE: ICD-10-CM

## 2024-05-22 DIAGNOSIS — F33.1 MAJOR DEPRESSIVE DISORDER, RECURRENT EPISODE, MODERATE (H): Primary | ICD-10-CM

## 2024-05-22 DIAGNOSIS — J44.89 FOLLICULAR BRONCHIOLITIS (H): ICD-10-CM

## 2024-05-22 DIAGNOSIS — K31.819 GAVE (GASTRIC ANTRAL VASCULAR ECTASIA): ICD-10-CM

## 2024-05-22 DIAGNOSIS — R79.89 ELEVATED PARATHYROID HORMONE: ICD-10-CM

## 2024-05-22 DIAGNOSIS — M35.02 SJOGREN'S SYNDROME WITH LUNG INVOLVEMENT (H): ICD-10-CM

## 2024-05-22 PROCEDURE — G2211 COMPLEX E/M VISIT ADD ON: HCPCS | Mod: 95 | Performed by: FAMILY MEDICINE

## 2024-05-22 PROCEDURE — 99214 OFFICE O/P EST MOD 30 MIN: CPT | Mod: 95 | Performed by: FAMILY MEDICINE

## 2024-05-22 NOTE — PROGRESS NOTES
Betty is a 84 year old who is being evaluated via a billable video visit.    How would you like to obtain your AVS? MyChart  If the video visit is dropped, the invitation should be resent by: Text to cell phone: [phone]  Will anyone else be joining your video visit? No      Assessment & Plan       ICD-10-CM    1. Major depressive disorder, recurrent episode, moderate (H)  F33.1       2. Sjogren's syndrome with lung involvement (H)  M35.02       3. Follicular bronchiolitis (H)  J42       4. Inflammatory arthritis  M19.90       5. Type 2 diabetes mellitus with hyperglycemia, with long-term current use of insulin (H)  E11.65     Z79.4          Appointment scheduled after a few recent worsening symptoms.    MDD/fatigue-   Pt has been on lexapro 20mg/d for awhile, but wondered about a 'happy pill' her friend took to see if it would help her mood.  Started on wellbutrin XL 150mg/d at last visit on 4/30/24, but she felt horrible on it- bad fatigue and felt very out of it.  She stopped it on 5/16/24, and feels energy/brain fog has improved.  She feels her mood is better - able to be active at her new home, lots to do, having people over for meals.   --Will continue lexapro 20mg/d, and continue off wellbutrin XL.    Sjorgrens with lung involvement-   Severe neck pain prior to 5/16/24 pulmonary visit.  Pain was so severe, she notes that she took a dilaudid pill and a muscle relaxant the night prior, and notes she was very out of it at the appointment (was also still on the wellbutrin making her tired, but not this out of it). She was unable to do any of the PFTs or walk test due to these issues. Dr. Tillman was not aware she took the dilaudid/muscle relaxant, and was concerned about the wellbutrin and neck pain, and rec urgent follow-up here.  --She has a follow-up appt at pulmonary on 8/16/24, should be able to complete the tests.  --Discussed BREO, she has trouble remembering it, takes ~3-4x/wk.  Is next to her  toothbrush- discussed she could take it in the evening if she forgets in the morning to try to get to more daily use.    Neck pain/Inflammatory arthritis-   Pt was having severe neck pain, and after while, remembered she could do the injections from Dr. Lopez for the inflammatory arthritis.  Tried the kineret injections x 2 days last weekend, and the pain resolved with no return of those sx's.  She has follow-up with Dr. Lopez 8/28/24.    Gout/osteoporosis/rheumatology follow-up -   Following with Dr. Lopez.  Last appt 2/24.  Pt on allopurinol, prn anakinra for gout flares.  For osteoporosis, on alendronate (though EGD report w/ GAVE work-up (reviewed from 11/22) mentioned this may be concerning). Dr. Lopez also noted worsening dexa from 8/23, though report notes left hip, and interval change notes worsening of R total femur (s/p recent R hip replacement). She had referred pt to endocrinology based on failed fosamax treatment.  Pt needed to cancel recent endo appt, now scheduled in '25.  ---Will send note to SIM Nicole to see if okay to also take on osteoporosis discussion/treatment options for patient.    GAVE (gastric antral vascular ectasia)-   History of anemia  Last EGD in 11/22, improved GAVE sx's.  No recs on if should be repeated, but did mention her iron and alendronate could worsen issue.  They think she has stopped the iron. Will check on alendronate alternative with MTM.  Will check labs again at upcoming appts and review.    Elevated parathyroid hormone  Vitamin D deficiency  Elevated PTH in '22, improved with Vit D supplementation.      CHF/HTN-  BP elevated again at her last clinic appt.  Dr. Rosa had stopped her spironolactone due to lower BPs, but said to be in touch if BPs went higher.  Pt will try and check BPs at home and reach out if higher.    Rhinitis-   Nose runs usually when she goes to her bedroom to the bathroom.  Has been taking the flonase more regularly, along with  "the rest of her allergy meds (claritin am, zyxal pm, singulair), but does not think it's helping.  --Consider allergy referral if not improving by next appt.    Rec follow-up in 9/24- can cancel the 7/24 appt.  Follow-up - MDD, DM II, pulmonary/rheum consults, med check.      BMI  Estimated body mass index is 29.63 kg/m  as calculated from the following:    Height as of 5/16/24: 1.67 m (5' 5.75\").    Weight as of 5/16/24: 82.6 kg (182 lb 3.2 oz).           I spent a total of 33 minutes on the day of the visit.   Time spent by me doing chart review, history and exam, documentation and further activities per the note        Subjective   Betty is a 84 year old, presenting for the following health issues:  Mental Health Problem      Video Start Time:  1:35pm    Mental Health Problem    She asked for a happy pill, started wellbutrin.  All she was doing was falling asleep, had to stop it (5/14/24).  It was also at a time when she had a horrible stiff, painful neck.  Thinks energy was getting better after stopping the wellbutrin (was still having the neck pain).  Thinks her mood is better than 4/30/24.  Hopefully they will get some help for Yvette- getting an MRI tomorrow.  Friend Rajwinder is here, and she is good, which helps.  At home, daily agenda, lots to do.  Swimming, going to PT, meeting with friends, eating (having people come over for meals (will make chili).      Had it when she went to Dr. Tillman.  Had taken a pill the night prior- took both a pain pill and muscle relaxer.  Had to apologize for her- she got concerned about her neck pain.    Neck pain - better   Has rx from Dr. Rich barillas, can use it for any gout pain.  Uses it for her inflammatory joint pain.  Nothing else was working, and that worked in about two days.  Used it just 2 days (not the 3 days she rx'd).  No se's on it.  Has follow-up with Dr. Lopez.      Allergies- starting back on the flonase.  Still on claritin and xyzal and singulair.  Running " drip - happens going from bedroom to the bathroom.  Flonase is not helping.    Follicular bronchiolitis-   Getting the BREO 3-4x/wk.  It's in her medicine cabinet- where she brushes her teeth.          Review of Systems  Constitutional, neuro, ENT, endocrine, pulmonary, cardiac, gastrointestinal, genitourinary, musculoskeletal, integument and psychiatric systems are negative, except as otherwise noted.      Objective           Vitals:  No vitals were obtained today due to virtual visit.    Physical Exam   GENERAL: alert and no distress  EYES: Eyes grossly normal to inspection.  No discharge or erythema, or obvious scleral/conjunctival abnormalities.  RESP: No audible wheeze, cough, or visible cyanosis.    SKIN: Visible skin clear. No significant rash, abnormal pigmentation or lesions.  NEURO: Cranial nerves grossly intact.  Mentation and speech appropriate for age.  PSYCH: Appropriate affect, tone, and pace of words        Video-Visit Details    Type of service:  Video Visit   Video End Time:1:56 PM  Originating Location (pt. Location): Home    Distant Location (provider location):  On-site  Platform used for Video Visit: Thad  Signed Electronically by: Ilene Tristan MD

## 2024-05-22 NOTE — Clinical Note
Hello!  Just spent an hour going through her chart, and checking in with her again on her supplements.  Noted that Dr. Lopez (in 2/24) had referred her to endo for osteoporosis due to worsening of her dxa on alendronate, and noted that 11/22 EGD noted alendronate could worsen her GAVE/GI bleed sx's, and noted that the dxa scan (8/23) may not be completely correct (noted R side worsening, but reported left side (and she was just s/p R hip replacement).  Mainly, I am wondering if you are willing to take on her osteoporosis medication.  I do think she should be off the alendronate, and I think it would be better to consolidate cares to you instead of sending her on to another specialist (and doesn't have appt until '25 with Dr. Hunt).  I talked to Betty about it and she knows you may be reaching out to schedule an appt to discuss if this sounds okay with you.  I'm out of the office soon until next Wed, but can teams/text me if ?'s/concerns.    Thank you! Lev

## 2024-05-22 NOTE — CONFIDENTIAL NOTE
RECORDS RECEIVED FROM: internal    DATE RECEIVED: 5.29.24    NOTES (FOR ALL VISITS) STATUS DETAILS   OFFICE NOTES from referring provider internal    Zulma Lopez MD      OFFICE NOTES from other specialist internal  4.30.24 Emanuelte   1.30.24 Kun      MEDICATION LIST internal     IMAGING      DEXASCAN internal  8.31.23   XR (Chest) internal  10.11.23, 3.7.23   CT (HEAD/NECK/CHEST/ABDOMEN) internal  3.7.23   LABS     DIABETES: HBGA1C, CREATININE, FASTING LIPIDS, MICROALBUMIN URINE, POTASSIUM, TSH, T4    THYROID: TSH, T4, CBC, THYRODLONULIN, TOTAL T3, FREE T4, CALCITONIN, CEA internal  Bmp- 4.25.24  TSH/T4- 4.25.24  Lipid- 4.25.24  HBGA1C- 4.25.24  Creatinine- 1.16.24  Cbc- 1.16.24  Glucose meter- 6.5.23

## 2024-05-23 NOTE — PATIENT INSTRUCTIONS
"Preventive Care Advice   This is general advice we often give to help people stay healthy. Your care team may have specific advice just for you. Please talk to your care team about your own preventive care needs.  Lifestyle  Exercise at least 150 minutes each week (30 minutes a day, 5 days a week).  Do muscle strengthening activities 2 days a week. These help control your weight and prevent disease.  No smoking.  Wear sunscreen to prevent skin cancer.  Have your home tested for radon every 2 to 5 years. Radon is a colorless, odorless gas that can harm your lungs. To learn more, go to www.health.[facility].mn. and search for \"Radon in Homes.\"  Keep guns unloaded and locked up in a safe place like a safe or gun vault, or, use a gun lock and hide the keys. Always lock away bullets separately. To learn more, visit [employer].gov and search for \"safe gun storage.\"  Nutrition  Eat 5 or more servings of fruits and vegetables each day.  Try wheat bread, brown rice and whole grain pasta (instead of white bread, rice, and pasta).  Get enough calcium and vitamin D. Check the label on foods and aim for 100% of the RDA (recommended daily allowance).  Regular exams  Have a dental exam and cleaning every 6 months.  See your health care team every year to talk about:  Any changes in your health.  Any medicines your care team has prescribed.  Preventive care, family planning, and ways to prevent chronic diseases.  Shots (vaccines)   HPV shots (up to age 26), if you've never had them before.  Hepatitis B shots (up to age 59), if you've never had them before.  COVID-19 shot: Get this shot when it's due.  Flu shot: Get a flu shot every year.  Tetanus shot: Get a tetanus shot every 10 years.  Pneumococcal, hepatitis A, and RSV shots: Ask your care team if you need these based on your risk.  Shingles shot (for age 50 and up).  General health tests  Diabetes screening:  Starting at age 35, Get screened for diabetes at least every 3 years.  If " you are younger than age 35, ask your care team if you should be screened for diabetes.  Cholesterol test: At age 39, start having a cholesterol test every 5 years, or more often if advised.  Bone density scan (DEXA): At age 50, ask your care team if you should have this scan for osteoporosis (brittle bones).  Hepatitis C: Get tested at least once in your life.  Abdominal aortic aneurysm screening: Talk to your doctor about having this screening if you:  Have ever smoked; and  Are biologically male; and  Are between the ages of 65 and 75.  STIs (sexually transmitted infections)  Before age 24: Ask your care team if you should be screened for STIs.  After age 24: Get screened for STIs if you're at risk. You are at risk for STIs (including HIV) if:  You are sexually active with more than one person.  You don't use condoms every time.  You or a partner was diagnosed with a sexually transmitted infection.  If you are at risk for HIV, ask about PrEP medicine to prevent HIV.  Get tested for HIV at least once in your life, whether you are at risk for HIV or not.  Cancer screening tests  Cervical cancer screening: If you have a cervix, begin getting regular cervical cancer screening tests at age 21. Most people who have regular screenings with normal results can stop after age 65. Talk about this with your provider.  Breast cancer scan (mammogram): If you've ever had breasts, begin having regular mammograms starting at age 40. This is a scan to check for breast cancer.  Colon cancer screening: It is important to start screening for colon cancer at age 45.  Have a colonoscopy test every 10 years (or more often if you're at risk) Or, ask your provider about stool tests like a FIT test every year or Cologuard test every 3 years.  To learn more about your testing options, visit: www.[employer].com/012197.pdf.  For help making a decision, visit: arun/zi54664.  Prostate cancer screening test: If you have a prostate and are age 55  to 69, ask your provider if you would benefit from a yearly prostate cancer screening test.  Lung cancer screening: If you are a current or former smoker age 50 to 80, ask your care team if ongoing lung cancer screenings are right for you.  For informational purposes only. Not to replace the advice of your health care provider. Copyright   2023 [facility]. All rights reserved. Clinically reviewed by the [facility] Transitions Program. [employer] 922793 - REV 04/24.    Learning About Depression Screening  What is depression screening?  Depression screening is a way to see if you have depression symptoms. It may be done by a doctor or counselor. It's often part of a routine checkup. That's because your mental health is just as important as your physical health.  Depression is a mental health condition that affects how you feel, think, and act. You may:  Have less energy.  Lose interest in your daily activities.  Feel sad and grouchy for a long time.  Depression is very common. It affects people of all ages.  Many things can lead to depression. Some people become depressed after they have a stroke or find out they have a major illness like cancer or heart disease. The death of a loved one or a breakup may lead to depression. It can run in families. Most experts believe that a combination of inherited genes and stressful life events can cause it.  What happens during screening?  You may be asked to fill out a form about your depression symptoms. You and the doctor will discuss your answers. The doctor may ask you more questions to learn more about how you think, act, and feel.  What happens after screening?  If you have symptoms of depression, your doctor will talk to you about your options.  Doctors usually treat depression with medicines or counseling. Often, combining the two works best. Many people don't get help because they think that they'll get over the depression on their own. But people  "with depression may not get better unless they get treatment.  The cause of depression is not well understood. There may be many factors involved. But if you have depression, it's not your fault.  A serious symptom of depression is thinking about death or suicide. If you or someone you care about talks about this or about feeling hopeless, get help right away.  It's important to know that depression can be treated. Medicine, counseling, and self-care may help.  Where can you learn more?  Go to https://www.[employer].net/patiented  Enter T185 in the search box to learn more about \"Learning About Depression Screening.\"  Current as of: June 24, 2023               Content Version: 14.0    8758-4528 [employer]   Care instructions adapted under license by your healthcare professional. If you have questions about a medical condition or this instruction, always ask your healthcare professional. [employer] disclaims any warranty or liability for your use of this information.      "

## 2024-05-29 ENCOUNTER — PRE VISIT (OUTPATIENT)
Dept: ENDOCRINOLOGY | Facility: CLINIC | Age: 84
End: 2024-05-29

## 2024-06-03 DIAGNOSIS — E11.9 TYPE 2 DIABETES MELLITUS WITHOUT COMPLICATION, WITHOUT LONG-TERM CURRENT USE OF INSULIN (H): ICD-10-CM

## 2024-06-04 RX ORDER — BLOOD SUGAR DIAGNOSTIC
STRIP MISCELLANEOUS
Qty: 300 STRIP | Refills: 1 | Status: SHIPPED | OUTPATIENT
Start: 2024-06-04

## 2024-06-07 DIAGNOSIS — Z87.19 HISTORY OF LOWER GI BLEEDING: ICD-10-CM

## 2024-06-07 DIAGNOSIS — M35.02 SJOGREN'S SYNDROME WITH LUNG INVOLVEMENT (H): ICD-10-CM

## 2024-06-07 DIAGNOSIS — K21.9 GASTROESOPHAGEAL REFLUX DISEASE WITHOUT ESOPHAGITIS: ICD-10-CM

## 2024-06-07 RX ORDER — HYDROXYCHLOROQUINE SULFATE 200 MG/1
200 TABLET, FILM COATED ORAL DAILY
Qty: 90 TABLET | Refills: 0 | Status: SHIPPED | OUTPATIENT
Start: 2024-06-07 | End: 2024-07-08

## 2024-06-07 NOTE — TELEPHONE ENCOUNTER
[facility] Call Center    Phone Message    May a detailed message be left on voicemail: yes     Reason for Call: Medication Refill Request    Has the patient contacted the pharmacy for the refill? Yes   Name of medication being requested: hydroxychloroquine (PLAQUENIL) 200 MG tablet   Provider who prescribed the medication: Zulma Lopez   Pharmacy:   [id number] - [place], [place] - [address]     Date medication is needed: ASAP   Pt is currently out of medication and would like to receive this ASAP and if possible by today. Please follow up     Action Taken: Other: Rheum    Travel Screening: Not Applicable     Date of Service:

## 2024-06-07 NOTE — TELEPHONE ENCOUNTER
[facility] Call Center    Phone Message    May a detailed message be left on voicemail: yes     Reason for Call: Medication Refill Request    Has the patient contacted the pharmacy for the refill? Yes   Name of medication being requested: pantoprazole (PROTONIX) 40 MG EC tablet   Provider who prescribed the medication: Dr. Dias   Pharmacy:   [id number] - [place], [place] - [address]   Date medication is needed: ASAP       Action Taken: Message routed to:  [facility] Brian and Nohemi    Travel Screening: Not Applicable     Date of Service:

## 2024-06-07 NOTE — TELEPHONE ENCOUNTER
Medication/Dose: Hydroxychloroquine  Last Written Prescription Date: 7/21/23  Last Fill Quantity: 90, # refills: 1  Last Office Visit:  2/28/2024  Next Enc:  8/8/24  Last eye exam for hydroxychloroquine : Jan 2021. Scheduled Sept 11, 2024     Routed to Dr. Lopez because  patient is overdue for eye exam.       Stefania Swenson RN  [facility]      no nausea and no vomiting.

## 2024-06-08 RX ORDER — PANTOPRAZOLE SODIUM 40 MG/1
40 TABLET, DELAYED RELEASE ORAL DAILY
Qty: 90 TABLET | Refills: 0 | Status: CANCELLED | OUTPATIENT
Start: 2024-06-08

## 2024-06-08 NOTE — TELEPHONE ENCOUNTER
pantoprazole (PROTONIX) 40 MG EC tablet   Disp-90 tablet, R-0   Start: 03/06/2024       10/6/2022  [facility] Winsome Thompson MD  Gastroenterology    Routed because: lv > 18 mth. Request per pt call. Sowmya refill last fill

## 2024-06-09 ENCOUNTER — MYC MEDICAL ADVICE (OUTPATIENT)
Dept: RHEUMATOLOGY | Facility: CLINIC | Age: 84
End: 2024-06-09
Payer: COMMERCIAL

## 2024-06-11 ENCOUNTER — TELEPHONE (OUTPATIENT)
Dept: FAMILY MEDICINE | Facility: CLINIC | Age: 84
End: 2024-06-11
Payer: COMMERCIAL

## 2024-06-11 ENCOUNTER — VIRTUAL VISIT (OUTPATIENT)
Dept: PHARMACY | Facility: CLINIC | Age: 84
End: 2024-06-11
Payer: COMMERCIAL

## 2024-06-11 DIAGNOSIS — Z92.29 HISTORY OF BISPHOSPHONATE THERAPY: ICD-10-CM

## 2024-06-11 DIAGNOSIS — M81.0 AGE-RELATED OSTEOPOROSIS WITHOUT CURRENT PATHOLOGICAL FRACTURE: Primary | ICD-10-CM

## 2024-06-11 DIAGNOSIS — K21.9 GASTROESOPHAGEAL REFLUX DISEASE WITHOUT ESOPHAGITIS: ICD-10-CM

## 2024-06-11 DIAGNOSIS — Z87.19 HISTORY OF LOWER GI BLEEDING: ICD-10-CM

## 2024-06-11 PROCEDURE — 99606 MTMS BY PHARM EST 15 MIN: CPT | Mod: 95 | Performed by: PHARMACIST

## 2024-06-11 RX ORDER — PANTOPRAZOLE SODIUM 40 MG/1
40 TABLET, DELAYED RELEASE ORAL DAILY
Qty: 90 TABLET | Refills: 3 | Status: SHIPPED | OUTPATIENT
Start: 2024-06-11

## 2024-06-11 NOTE — PROGRESS NOTES
Medication Therapy Management (MTM) Encounter    ASSESSMENT:                            Medication Adherence/Access: Given history of GAVE, Pantoprazole was intended on being lifelong, will refill.     Osteoporosis:   T-scores worsened on Alendronate. She continues chronic PPI and corticosteroid therapy increasing her risk. Technically her CrCl is still safe enough for alendronate or Reclast, but given her bone loss, escalating therapy to Prolia makes more sense. Labs are ordered, at this time since her eGFR is >30, there are no concerns for malabsorption or hx of hypocalcemia, labs after Prolia are not required.       PLAN:                            Stop Alendronate  Start Prolia - I will have the [facility] team reach Nghia about scheduling.   Please make sure you get your labs done prior to the Prolia injection.   Refilled Pantoprazole    Follow-up: Sabrina will reach out after your injection.     SUBJECTIVE/OBJECTIVE:                          Betty Tee is a 84 year old female contacted via video for a follow-up visit.       Reason for visit: Discuss Osteoporosis treatment.    Allergies/ADRs: Reviewed in chart  Past Medical History: Reviewed in chart  Tobacco: She reports that she quit smoking about 12 years ago. Her smoking use included cigarettes. She started smoking about 54 years ago. She has a 20.8 pack-year smoking history. She has never used smokeless tobacco.  Alcohol: not currently using    Medication Adherence/Access: Difficulty getting two of her meds.  Hydroxychloroquine (now resolved) and pantoprazole - cannot reach provider, wondering if [facility] can fill this.     Osteoporosis:   Alendronate (Fosamax) 70mg weekly   Vitamin D3 100mcg daily  Patient is not experiencing side effects.  DEXA History: Last completed in Aug 2023 with worst T-Score at L femoral neck (-2.6) prior DEXA in 2017 showed worst T-Score at -1.7.   Current CrCl is 44.3ml/min (38.7ml/min based on IBW).   She has previously been dx with  GI bleeding/GAVE and it was recommended that she switch therapies due to alendronate's risk of adverse GI effects.   Patient is getting  3 serving(s)/day of calcium in their diet.  Risk factors: post-menopausal, chronic corticosteroid use, chronic PPI use  Labs are ordered: PTH, Vitamin D, Calcium.   ----------------  I spent 13 minutes with this patient today. All changes were made via collaborative practice agreement with Ilene Tristan MD. A copy of the visit note was provided to the patient's provider(s).    A summary of these recommendations was sent via [facility].    Sabrina Meek, Pharm.D., M.B.A., BCACP  MTM Pharmacist, [facility]    Telemedicine Visit Details  Type of service:  Video Conference via [employer]  Joined the call at 6/11/2024, 11:40:59 am.  Left the call at 6/11/2024, 11:53:41 am.  You were on the call for 12 minutes 41 seconds .     Medication Therapy Recommendations  No medication therapy recommendations to display The following steps were completed to comply with the REMS program for Prolia:  Reviewed the serious risks of Prolia  and the symptoms of each risk.  Advised patient to seek prompt medical attention if they have signs or symptoms of any of the serious risks.  Patient will be provided a copy of the Medication Guide and Patient Brochure prior to first injection.    Sabrina Meek [facility]

## 2024-06-11 NOTE — PATIENT INSTRUCTIONS
You have selected the below site for your Prolia injection. If you did not schedule this appointment in clinic, please call the number listed below.  [place] (up) [phone]

## 2024-06-11 NOTE — TELEPHONE ENCOUNTER
Called care Coordinator Nghia  patient is scheduled 6/27/24 at 1:00 pm for a Prolia Injection  Tea Ratliff  Care Unit Coordinator

## 2024-06-11 NOTE — TELEPHONE ENCOUNTER
TCs,   Please assist with scheduling nurse only Prolia injection appointment  Pt requested reaching out to care coordinator, Nghia at [phone] to assist with scheduling  2 weeks out should allow PA team time for processing  Thanks,  Elise RIVERA RN

## 2024-06-11 NOTE — TELEPHONE ENCOUNTER
Addressed during MTM visit.   Sabrina Meek PharmD, JAMES, BCACP  MTM Pharmacist, [facility]

## 2024-06-11 NOTE — PATIENT INSTRUCTIONS
"Recommendations from today's MTM visit:                                                    MTM (medication therapy management) is a service provided by a clinical pharmacist designed to help you get the most of out of your medicines.   Today we reviewed what your medicines are for, how to know if they are working, that your medicines are safe and how to make your medicine regimen as easy as possible.      Stop Alendronate  Start Prolia - I will have the [facility] team reach Nghia about scheduling.   Please make sure you get your labs done prior to the Prolia injection.     Follow-up: Sabrina will reach out after your injection.     It was great speaking with you today.  I value your experience and would be very thankful for your time in providing feedback in our clinic survey. In the next few days, you may receive an email or text message from [employer] with a link to a survey related to your  clinical pharmacist.\"     To schedule another MTM appointment, please call the clinic directly or you may call the MTM scheduling line at [phone] or toll-free at [phone].     My Clinical Pharmacist's contact information:                                                      Please feel free to contact me with any questions or concerns you have.      Sabrina Meek, Pharm.D., M.B.A., [facility]  MTM Pharmacist, [facility]  Pager: [phone]  Email: [email or web address]      You have selected the below site for your Prolia injection. If you did not schedule this appointment in clinic, please call the number listed below.  [facility]) [phone]   Osteoporosis: Care Instructions  Overview     Osteoporosis causes bones to become thin and weak. It is much more common in women than in men. Your chances of getting this disease depend on several things. These factors include the thickness of your bones (bone density), as well as health, diet, and physical activity.  This disease may be very advanced " before you know you have it. Sometimes the first sign is a broken bone in the hip, spine, or wrist. Or you may have sudden pain in your middle or lower back.  Follow-up care is a key part of your treatment and safety. Be sure to make and go to all appointments, and call your doctor if you are having problems. It's also a good idea to know your test results and keep a list of the medicines you take.  How can you care for yourself at home?  Your doctor may prescribe a bisphosphonate, such as risedronate (Actonel) or alendronate (Fosamax), for osteoporosis. If you are taking one of these medicines by mouth:  Take your medicine with a full glass of water when you first get up in the morning.  Do not lie down, eat, drink a beverage, or take any other medicine for at least 30 minutes after taking the drug. This helps prevent stomach problems.  Do not take your medicine late in the day if you forgot to take it in the morning. Skip it, and take the usual dose the next morning.  If you have side effects, tell your doctor. Your doctor may prescribe another medicine.  Get enough calcium and vitamin D. The recommended dietary allowances (RDAs) for adults younger than age 51 are 1,000 mg of calcium and 600 IU of vitamin D each day. Women ages 51 to 70 need 1,200 mg of calcium and 600 IU of vitamin D each day. Men ages 51 to 70 need 1,000 mg of calcium and 600 IU of vitamin D each day. Adults 71 and older need 1,200 mg of calcium and 800 IU of vitamin D each day. It's not clear if people who already have osteoporosis need more calcium and vitamin D than this. Talk to your doctor about what's right for you.  Eat foods rich in calcium, like yogurt, cheese, milk, and dark green vegetables. This is a good way to get the calcium you need. You can get vitamin D from eggs, fatty fish, cereal, and milk.  Ask your doctor if you need to take a calcium plus vitamin D supplement. You may be able to get enough calcium and vitamin D through  "your diet. Be careful with supplements. Adults ages 19 to 50 should not get more than 2,500 mg of calcium and 4,000 IU of vitamin D each day, whether it is from supplements and/or food. Adults ages 51 and older should not get more than 2,000 mg of calcium and 4,000 IU of vitamin D each day from supplements and/or food.  Limit alcohol to 2 drinks a day for men and 1 drink a day for women. Too much alcohol can cause health problems.  Do not smoke. Smoking puts you at a much higher risk for osteoporosis. If you need help quitting, talk to your doctor about stop-smoking programs and medicines. These can increase your chances of quitting for good.  Get regular bone-building exercise. Weight-bearing and resistance exercises keep bones healthy by working the muscles and bones against gravity. Start out at an exercise level that feels right for you. Add a little at a time until you can do the following:  Do 30 minutes of weight-bearing exercise on most days of the week. Walking, jogging, stair climbing, and dancing are good choices.  Do resistance exercises with weights or elastic bands 2 to 3 days a week.  Reduce your risk of falls:  Wear supportive shoes with low heels and nonslip soles.  Use a cane or walker, if you need it. Use shower chairs and bath benches. Put in handrails on stairways, around your shower or tub area, and near the toilet.  Keep stairs, porches, and walkways well lit. Use night-lights.  Remove throw rugs and other objects that are in the way.  Avoid icy, wet, or slippery surfaces.  When should you call for help?  Watch closely for changes in your health, and be sure to contact your doctor if you have any problems.  Where can you learn more?  Go to https://www.[employer].net/patiented  Enter K100 in the search box to learn more about \"Osteoporosis: Care Instructions.\"  Current as of: July 10, 2023               Content Version: 14.0    9655-5291 [employer].   Care instructions adapted " under license by your healthcare professional. If you have questions about a medical condition or this instruction, always ask your healthcare professional. [employer] disclaims any warranty or liability for your use of this information.

## 2024-06-11 NOTE — TELEPHONE ENCOUNTER
See MTM note for additional details - due to renal function, switching pt from alendronate to denosumab. Order placed. Will route to Triage/team for help scheduling patient. Pt requested reaching out to care coordinator, Nghia at [phone].     Sabrina Meek PharmD, JAMES, BCACP  MT Pharmacist, [facility]   The following steps were completed to comply with the REMS program for Prolia:  Reviewed the serious risks of Prolia  and the symptoms of each risk.  Advised patient to seek prompt medical attention if they have signs or symptoms of any of the serious risks.  Patient will be provided a copy of the Medication Guide and Patient Brochure prior to first injection.    Sabrina Meek [facility]

## 2024-06-18 DIAGNOSIS — J44.89 FOLLICULAR BRONCHIOLITIS (H): ICD-10-CM

## 2024-06-18 RX ORDER — AZITHROMYCIN 250 MG/1
TABLET, FILM COATED ORAL
Qty: 30 TABLET | Refills: 11 | Status: SHIPPED | OUTPATIENT
Start: 2024-06-18

## 2024-06-24 NOTE — TELEPHONE ENCOUNTER
You are receiving this encounter because we received information you received this page while on call and we did not find a note in the chart about the advice/directives you provided to the patient. Please indicate your actions in this encounter.      Paging    Message # [id number]         2024 07:47p   [NALINI]  To:  From:  CONSOLE  Primary MD:  Phone#:  ----------------------------------------------------------------------  HEAVEN, [phone], RE PT FAREED STALLWORTH, VICTOR MANUEL 12, RE TICK QUESTION, RE PT OF DR REYNA Simmons at number :  PAGE: [phone] at :  19:47          (Message Delivered)   D E L I V E R I E S :  2024 07:47p           NALINI         Delivered  ======================================================================      reviewed. Norco refill approved.    Charly Moore [facility]

## 2024-06-30 DIAGNOSIS — M35.02 SJOGREN'S SYNDROME WITH LUNG INVOLVEMENT (H): ICD-10-CM

## 2024-06-30 DIAGNOSIS — M10.9 ACUTE GOUT OF FOOT, UNSPECIFIED CAUSE, UNSPECIFIED LATERALITY: ICD-10-CM

## 2024-06-30 DIAGNOSIS — E78.5 HYPERLIPIDEMIA LDL GOAL <100: ICD-10-CM

## 2024-06-30 DIAGNOSIS — Z79.4 TYPE 2 DIABETES MELLITUS WITH HYPERGLYCEMIA, WITH LONG-TERM CURRENT USE OF INSULIN (H): ICD-10-CM

## 2024-06-30 DIAGNOSIS — J30.1 SEASONAL ALLERGIC RHINITIS DUE TO POLLEN: ICD-10-CM

## 2024-06-30 DIAGNOSIS — F33.1 MAJOR DEPRESSIVE DISORDER, RECURRENT EPISODE, MODERATE (H): ICD-10-CM

## 2024-06-30 DIAGNOSIS — E11.65 TYPE 2 DIABETES MELLITUS WITH HYPERGLYCEMIA, WITH LONG-TERM CURRENT USE OF INSULIN (H): ICD-10-CM

## 2024-07-01 RX ORDER — LEVOCETIRIZINE DIHYDROCHLORIDE 5 MG/1
5 TABLET, FILM COATED ORAL EVERY EVENING
Qty: 90 TABLET | Refills: 1 | Status: SHIPPED | OUTPATIENT
Start: 2024-07-01

## 2024-07-01 RX ORDER — BUPROPION HYDROCHLORIDE 150 MG/1
150 TABLET ORAL EVERY MORNING
Qty: 90 TABLET | Refills: 0 | Status: SHIPPED | OUTPATIENT
Start: 2024-07-01 | End: 2024-05-15 | Stop reason: SINTOL

## 2024-07-01 RX ORDER — INSULIN GLARGINE 100 [IU]/ML
15 INJECTION, SOLUTION SUBCUTANEOUS AT BEDTIME
Qty: 30 ML | Refills: 0 | Status: SHIPPED | OUTPATIENT
Start: 2024-07-01

## 2024-07-08 ENCOUNTER — ANCILLARY PROCEDURE (OUTPATIENT)
Dept: GENERAL RADIOLOGY | Facility: CLINIC | Age: 84
End: 2024-07-08
Attending: PHYSICIAN ASSISTANT
Payer: COMMERCIAL

## 2024-07-08 ENCOUNTER — LAB (OUTPATIENT)
Dept: LAB | Facility: CLINIC | Age: 84
End: 2024-07-08
Payer: COMMERCIAL

## 2024-07-08 ENCOUNTER — ALLIED HEALTH/NURSE VISIT (OUTPATIENT)
Dept: FAMILY MEDICINE | Facility: CLINIC | Age: 84
End: 2024-07-08
Payer: COMMERCIAL

## 2024-07-08 ENCOUNTER — OFFICE VISIT (OUTPATIENT)
Dept: URGENT CARE | Facility: URGENT CARE | Age: 84
End: 2024-07-08
Payer: COMMERCIAL

## 2024-07-08 VITALS
HEART RATE: 49 BPM | WEIGHT: 180 LBS | TEMPERATURE: 97.8 F | OXYGEN SATURATION: 96 % | HEIGHT: 66 IN | BODY MASS INDEX: 28.93 KG/M2 | SYSTOLIC BLOOD PRESSURE: 150 MMHG | DIASTOLIC BLOOD PRESSURE: 77 MMHG

## 2024-07-08 VITALS — TEMPERATURE: 98.1 F

## 2024-07-08 DIAGNOSIS — M35.02 SJOGREN'S SYNDROME WITH LUNG INVOLVEMENT (H): ICD-10-CM
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
## 2024-07-08 DIAGNOSIS — D50.9 IRON DEFICIENCY ANEMIA, UNSPECIFIED IRON DEFICIENCY ANEMIA TYPE: ICD-10-CM

## 2024-07-08 DIAGNOSIS — M10.9 ACUTE GOUT OF FOOT, UNSPECIFIED CAUSE, UNSPECIFIED LATERALITY: ICD-10-CM

## 2024-07-08 DIAGNOSIS — R53.83 OTHER FATIGUE: ICD-10-CM

## 2024-07-08 DIAGNOSIS — E53.8 VITAMIN B12 DEFICIENCY (NON ANEMIC): ICD-10-CM

## 2024-07-08 DIAGNOSIS — K31.819 GAVE (GASTRIC ANTRAL VASCULAR ECTASIA): ICD-10-CM

## 2024-07-08 DIAGNOSIS — M81.0 AGE-RELATED OSTEOPOROSIS WITHOUT CURRENT PATHOLOGICAL FRACTURE: Primary | ICD-10-CM

## 2024-07-08 DIAGNOSIS — E55.9 VITAMIN D DEFICIENCY: ICD-10-CM

## 2024-07-08 DIAGNOSIS — S60.212A CONTUSION OF LEFT WRIST, INITIAL ENCOUNTER: ICD-10-CM

## 2024-07-08 LAB
ALT SERPL W P-5'-P-CCNC: 12 U/L (ref 0–50)
AST SERPL W P-5'-P-CCNC: 20 U/L (ref 0–45)
CREAT SERPL-MCNC: 0.97 MG/DL (ref 0.51–0.95)
CRP SERPL-MCNC: 19 MG/L
EGFRCR SERPLBLD CKD-EPI 2021: 57 ML/MIN/1.73M2
ERYTHROCYTE [DISTWIDTH] IN BLOOD BY AUTOMATED COUNT: 13.9 % (ref 10–15)
FERRITIN SERPL-MCNC: 120 NG/ML (ref 11–328)
HCT VFR BLD AUTO: 40.9 % (ref 35–47)
HGB BLD-MCNC: 12.9 G/DL (ref 11.7–15.7)
MCH RBC QN AUTO: 29.3 PG (ref 26.5–33)
MCHC RBC AUTO-ENTMCNC: 31.5 G/DL (ref 31.5–36.5)
MCV RBC AUTO: 93 FL (ref 78–100)
PLATELET # BLD AUTO: 205 10E3/UL (ref 150–450)
PTH-INTACT SERPL-MCNC: 49 PG/ML (ref 15–65)
RBC # BLD AUTO: 4.41 10E6/UL (ref 3.8–5.2)
URATE SERPL-MCNC: 4.2 MG/DL (ref 2.4–5.7)
VIT B12 SERPL-MCNC: 1000 PG/ML (ref 232–1245)
VIT D+METAB SERPL-MCNC: 43 NG/ML (ref 20–50)
WBC # BLD AUTO: 9.4 10E3/UL (ref 4–11)

## 2024-07-08 PROCEDURE — 99213 OFFICE O/P EST LOW 20 MIN: CPT | Performed by: PHYSICIAN ASSISTANT

## 2024-07-08 PROCEDURE — 73110 X-RAY EXAM OF WRIST: CPT | Mod: TC | Performed by: RADIOLOGY

## 2024-07-08 PROCEDURE — 82607 VITAMIN B-12: CPT

## 2024-07-08 PROCEDURE — 82728 ASSAY OF FERRITIN: CPT

## 2024-07-08 PROCEDURE — 83970 ASSAY OF PARATHORMONE: CPT

## 2024-07-08 PROCEDURE — 85027 COMPLETE CBC AUTOMATED: CPT

## 2024-07-08 PROCEDURE — 73130 X-RAY EXAM OF HAND: CPT | Mod: TC | Performed by: RADIOLOGY

## 2024-07-08 PROCEDURE — 36415 COLL VENOUS BLD VENIPUNCTURE: CPT

## 2024-07-08 PROCEDURE — 82565 ASSAY OF CREATININE: CPT

## 2024-07-08 PROCEDURE — 84450 TRANSFERASE (AST) (SGOT): CPT

## 2024-07-08 PROCEDURE — 84550 ASSAY OF BLOOD/URIC ACID: CPT

## 2024-07-08 PROCEDURE — 84460 ALANINE AMINO (ALT) (SGPT): CPT

## 2024-07-08 PROCEDURE — 99207 PR NO CHARGE NURSE ONLY: CPT

## 2024-07-08 PROCEDURE — 82306 VITAMIN D 25 HYDROXY: CPT

## 2024-07-08 PROCEDURE — 86140 C-REACTIVE PROTEIN: CPT

## 2024-07-08 PROCEDURE — 96372 THER/PROPH/DIAG INJ SC/IM: CPT | Performed by: FAMILY MEDICINE

## 2024-07-08 RX ORDER — HYDROXYCHLOROQUINE SULFATE 200 MG/1
TABLET, FILM COATED ORAL
Qty: 90 TABLET | Refills: 0 | Status: SHIPPED | OUTPATIENT
Start: 2024-07-08

## 2024-07-08 RX ORDER — ALLOPURINOL 100 MG/1
100 TABLET ORAL DAILY
Qty: 90 TABLET | Refills: 1 | Status: SHIPPED | OUTPATIENT
Start: 2024-07-08

## 2024-07-08 RX ORDER — ATORVASTATIN CALCIUM 10 MG/1
5 TABLET, FILM COATED ORAL DAILY
Qty: 45 TABLET | Refills: 2 | Status: SHIPPED | OUTPATIENT
Start: 2024-07-08

## 2024-07-08 NOTE — PROGRESS NOTES
Clinic Administered Medication Documentation      Prolia Documentation    Indication: Prolia  (denosumab) is a prescription medicine used to treat osteoporosis in patients who:   Are at high risk for fracture, meaning patients who have had a fracture related to osteoporosis, or who have multiple risk factors for fracture.  Cannot use another osteoporosis medicine or other osteoporosis medicines did not work well.  The timeline for early/late injections would be 4 weeks early and any time after the 6 month savana. If a patient receives their injection late, then the subsequent injection would be 6 months from the date that they actually received the injection.    When was the last injection?  N/A (first injection today)  Was the last injection at least 6 months ago? Yes  Has the prior authorization been completed?  Yes  Is there an active order (written within the past 365 days, with administrations remaining, not ) in the chart?  Yes   GFR Estimate   Date Value Ref Range Status   2024 54 (L) >60 mL/min/1.73m2 Final   2021 47 (L) >60 mL/min/[1.73_m2] Final     Comment:     Non  GFR Calc  Starting 2018, serum creatinine based estimated GFR (eGFR) will be   calculated using the Chronic Kidney Disease Epidemiology Collaboration   (CKD-EPI) equation.       Has patient had a GFR within the last 12 months? Yes   Is GFR under 30, or patient has a diagnosis of CKD4 or CKD5? No   Patient denies gastric bypass or parathyroid surgery in past 6 months? Yes - patient denies.   Patient denies dental work in the past two months involving drilling into the bone, such as implants/extractions, oral surgery or a tooth extraction that has not healed yet?  Yes  Patient denies plans for an emergency tooth extraction within the next week? Yes    The following steps were completed to comply with the REMS program for Prolia:  Reviewed information in the Medication Guide, including the serious risks of  Prolia  and the symptoms of each risk.  Advised patient to seek prompt medical attention if they have signs or symptoms of any of the serious risks.  Provided each patient a copy of the Medication Guide and Patient Guide.    Prior to injection, verified patient identity using patient's name and date of birth. Medication was administered. Please see MAR and medication order for additional information. Patient instructed to remain in clinic for 15 minutes and report any adverse reaction to staff immediately.    Vial/Syringe: Syringe  Was this medication supplied by the patient? No  Verified that the patient has refills remaining in their prescription.    Yvonne Fabian RN

## 2024-07-08 NOTE — TELEPHONE ENCOUNTER
atorvastatin (LIPITOR) 10 MG tablet 45 tablet 3 5/11/2023       Last Office Visit: 3/7/24  Future Office visit:  9/12/24       Antihyperlipidemic agents Passed   Kira Weber RN  P Red Flag Triage/MRT

## 2024-07-08 NOTE — PROGRESS NOTES
SUBJECTIVE:   Betty Tee is a 84 year old female presenting with a chief complaint of   Chief Complaint   Patient presents with    Urgent Care    Musculoskeletal Problem    Fall     Pt in clinic to have eval for left hand/wrist pain due to fall.       She is an established patient of [facility].  Patient fell 2 days ago on stairs.  Osteoporosis and RH.  RHD.  Bilateral knee pain, but minor.      Treatment:  tylenol and ice.         Review of Systems    Past Medical History:   Diagnosis Date    Alcohol abuse, in remission     Allergic rhinitis, cause unspecified     allegra helps when she takes it    Antiplatelet or antithrombotic long-term use     Atrial fibrillation (H)     in hosp in 11/11 after surgery w/ fluid overload    Cardiomegaly     LVH on stress echo- cardiac w/u at [facility]- neg CT scan for PE, neg stress echo in 8/06    Chest pain, unspecified     Congestive heart failure (H)     Depressive disorder     Diabetes (H)     Disorder of bone and cartilage, unspecified     osteopenia (had been on prempro), improved on 6/06 dexa, stable dexa 11/10    Diverticulosis of colon (without mention of hemorrhage)     last episode yrs ago    Essential hypertension, benign     Follicular bronchiolitis (H)     associated with Sjogrens, dx by chest CT showing mosaic attenuation and air trapping    Gastro-oesophageal reflux disease     ILD (interstitial lung disease) (H)     associated with Sjogrens, also has mildly elevated IgG4, first noted on chest CT 2015 (mild changes) and also has small airways disease; ILD improved on follow up chest CT 2018.    Insomnia, unspecified     weaned off clonazepam    Irregular heart beat     Lumbago 07/2009    MRI with DJD, now seeing Dr. Cain for sciatic sx's    Major depressive disorder, recurrent episode, moderate (H)     Obstructive sleep apnea     uses cpap    Osteoarthrosis, unspecified whether generalized or localized, unspecified site     Sjogren's syndrome (H24)     + RG  and SSA and lip bx    Sleep apnea     uses a cpap machine    Tobacco use disorder     chantix in 9/07, started again in 6/08, working     Family History   Problem Relation Age of Onset    C.A.D. Mother 63        MI- first at age 63    Heart Disease Mother     Hypertension Mother     Cerebrovascular Disease Mother     Hyperlipidemia Mother     Coronary Artery Disease Mother         MI-first at age 63    Other - See Comments Mother         cerebrovascular disease     Alcohol/Drug Father     Alzheimer Disease Father     Dementia Father     Hypertension Father     Hyperlipidemia Father     Substance Abuse Father     Diabetes Sister     Hypertension Sister     Hyperlipidemia Sister     Substance Abuse Sister     Asthma Sister     C.A.D. Sister 52        Minor MI- age 50's    Heart Disease Sister     Hypertension Sister     Hypertension Sister     Cancer - colorectal Sister 48        Late 40's early 50's    Gastrointestinal Disease Sister         Diverticulitis    Lipids Sister     Lipids Sister     Diabetes Sister     Heart Disease Sister         CHF    Cancer Sister         lung, smoker    Substance Abuse Sister     Asthma Sister     Cancer Sister     Coronary Artery Disease Sister         minor MI-age 50's    Heart Disease Sister     Hypertension Sister     Hypertension Sister     Colon Cancer Sister     Diverticulitis Sister     Lung Cancer Sister     Heart Disease Sister         CHF    Diabetes Sister     Asthma Sister     Hypertension Brother     Prostate Cancer Brother 74        Dx'd age 74    Gastrointestinal Disease Brother         Diverticulitis    Parkinsonism Brother     Substance Abuse Brother     Prostate Cancer Brother     Hyperlipidemia Brother     Hypertension Brother     Prostate Cancer Brother     Diverticulitis Brother     Parkinsonism Brother     Breast Cancer Daughter     Breast Cancer Daughter     Diabetes Other     Hypertension Other     Anesthesia Reaction No family hx of     Deep Vein Thrombosis  (DVT) No family hx of      Current Outpatient Medications   Medication Sig Dispense Refill    acyclovir (ZOVIRAX) 400 MG tablet Take 1 tablet (400 mg) by mouth every 8 hours For a couple days 15 tablet 2    acyclovir (ZOVIRAX) 5 % external ointment Apply topically as needed (6 times day PRN for outbreaks) As needed for outbreaks 15 g 3    albuterol (PROAIR HFA/PROVENTIL HFA/VENTOLIN HFA) 108 (90 Base) MCG/ACT inhaler Inhale 2 puffs into the lungs every 6 hours as needed for wheezing or shortness of breath      allopurinol (ZYLOPRIM) 100 MG tablet TAKE 1 TABLET BY MOUTH EVERY DAY 90 tablet 1    anakinra (KINERET) 100 MG/0.67ML SOSY injection Inject 0.67 mLs (100 mg) Subcutaneous daily 20.1 mL 3    aspirin (ASA) 81 MG EC tablet Take 1 tablet (81 mg) by mouth 2 times daily 60 tablet 0    atorvastatin (LIPITOR) 10 MG tablet Take 0.5 tablets (5 mg) by mouth daily 45 tablet 2    azithromycin (ZITHROMAX) 250 MG tablet TAKE 1 TABLET BY MOUTH EVERY DAY 30 tablet 11    BD PEN NEEDLE JANE 2ND GEN 32G X 4 MM miscellaneous USE TO TEST 4 TIMES A  each 1    blood glucose (ACCU-CHEK SHANNON PLUS) test strip USE TO TEST BLOOD SUGARS 3 TIMES DAILY 300 strip 1    blood glucose (NO BRAND SPECIFIED) lancets standard Use to test blood sugar 3 times daily or as directed 100 Lancet 3    BREO ELLIPTA 100-25 MCG/ACT inhaler TAKE 1 PUFF BY MOUTH EVERY DAY 1 each 11    buPROPion (WELLBUTRIN XL) 150 MG 24 hr tablet TAKE 1 TABLET BY MOUTH EVERY MORNING 90 tablet 0    cevimeline (EVOXAC) 30 MG capsule Take 1 capsule (30 mg) by mouth 3 times daily as needed      Cholecalciferol (VITAMIN D3) 50 MCG (2000 UT) CAPS TAKE 100 MCG BY MOUTH DAILY (TAKE 2 TABLET (50 MCG) BY MOUTH DAILY - ORAL) 180 capsule 2    COMPOUNDED NON-CONTROLLED SUBSTANCE (CMPD RX) - PHARMACY TO MIX COMPOUNDED MEDICATION Estriol 1 mg/g Apply small amount to finger and apply to inside vagina daily for 2 weeks then twice weekly Route: vaginally Dispense 30 grams 11 refills 30 g  11    cyanocobalamin (VITAMIN B-12) 1000 MCG tablet Take 1 tablet (1,000 mcg) by mouth three times a week      escitalopram (LEXAPRO) 20 MG tablet Take 1 tablet (20 mg) by mouth daily 90 tablet 1    fluticasone (FLONASE) 50 MCG/ACT nasal spray SPRAY 1-2 SPRAYS INTO BOTH NOSTRILS DAILY AS NEEDED FOR ALLERGIES 16 mL 11    hydroxychloroquine (PLAQUENIL) 200 MG tablet TAKE 1 TABLET (200 MG) BY MOUTH DAILY GET ANNUAL EYE EXAMS FOR MONITORING AND FAX [phone] 90 tablet 0    insulin glargine (LANTUS SOLOSTAR) 100 UNIT/ML pen INJECT 15 UNITS SUBCUTANEOUS AT BEDTIME 30 mL 0    insulin lispro (HUMALOG KWIKPEN) 100 UNIT/ML (1 unit dial) KWIKPEN Inject Subcutaneous 3 times daily (before meals) Sliding scale insulin; tests BG three times day  If BG <150, no insulin given   If -200 take 2 units  If -250 take 4 units  If -300 take 6 units  If -350 take 10 units  If BG >350 take 14 units      ketoconazole (NIZORAL) 2 % external cream Apply topically 2 times daily as needed for itching 60 g 1    levocetirizine (XYZAL) 5 MG tablet TAKE 1 TABLET BY MOUTH EVERY DAY IN THE EVENING 90 tablet 1    lidocaine (LIDODERM) 5 % patch APPLY PATCH TO PAINFUL AREA FOR UP TO 12 H WITHIN A 24 H PERIOD. REMOVE AFTER 12 HOURS. (Patient taking differently: Apply patch to painful area for up to 12 h within a 24 h period.  Remove after 12 hours.) 30 patch 2    loratadine (CLARITIN) 10 MG tablet TAKE 1 TABLET BY MOUTH EVERY DAY 90 tablet 3    methocarbamol (ROBAXIN) 750 MG tablet Take 1 tablet (750 mg) by mouth 3 times daily as needed for muscle spasms 90 tablet 3    metoprolol succinate ER (TOPROL XL) 50 MG 24 hr tablet TAKE 1 TABLET BY MOUTH TWICE A  tablet 3    mirabegron (MYRBETRIQ) 25 MG 24 hr tablet Take 1 tablet (25 mg) by mouth daily 30 tablet 11    montelukast (SINGULAIR) 10 MG tablet TAKE 1 TABLET BY MOUTH EVERYDAY AT BEDTIME 90 tablet 3    pantoprazole (PROTONIX) 40 MG EC tablet Take 1 tablet (40 mg) by  "mouth daily (replaces famotidine- should stop taking famotidine) 90 tablet 3    polyethylene glycol (MIRALAX) 17 g packet Take 17 g by mouth daily 10 packet 0    potassium chloride ER (KLOR-CON) 20 MEQ CR tablet Take 2 tablets (40 mEq) by mouth every morning AND 1 tablet (20 mEq) every evening. 270 tablet 3    predniSONE (DELTASONE) 10 MG tablet For gout flares, take the prednisone as 40-30-20-10 mg a day, each course x 3 days then go back to 5 mg a day 30 tablet 3    predniSONE (DELTASONE) 5 MG tablet Take 1 tablet (5 mg) by mouth daily 90 tablet 1    Semaglutide, 2 MG/DOSE, (OZEMPIC) 8 MG/3ML pen Inject 2 mg Subcutaneous every 7 days 9 mL 3    torsemide (DEMADEX) 20 MG tablet TAKE 2 TABLETS (40 MG) BY MOUTH EVERY MORNING AND 1 TABLET (20 MG) DAILY AT 2 PM. TAKE THE AFTERNOON DOSE WITH AN EXTRA 10 MG TAB FOR A TOTAL OF 30 MG IN THE AFTERNOON 270 tablet 3    traZODone (DESYREL) 50 MG tablet Take 3 tablets (150 mg) by mouth at bedtime 90 tablet 9     Social History     Tobacco Use    Smoking status: Former     Current packs/day: 0.00     Average packs/day: 0.5 packs/day for 41.6 years (20.8 ttl pk-yrs)     Types: Cigarettes     Start date:      Quit date: 2011     Years since quittin.9    Smokeless tobacco: Never    Tobacco comments:     1/2 ppd   Substance Use Topics    Alcohol use: No     Alcohol/week: 0.0 standard drinks of alcohol     Comment: In recovery beginning        OBJECTIVE  BP (!) 150/77   Pulse (!) 49   Temp 97.8  F (36.6  C) (Temporal)   Ht 1.676 m (5' 6\")   Wt 81.6 kg (180 lb)   LMP  (LMP Unknown)   SpO2 96%   BMI 29.05 kg/m      Physical Exam  Vitals and nursing note reviewed.   Constitutional:       Appearance: Normal appearance. She is obese.   Cardiovascular:      Rate and Rhythm: Normal rate.   Musculoskeletal:         General: Swelling present.      Comments: Left hand:  ecchymosis to proximal metacarpals.  Painful ROM. Negative snuff box.  Radial pulse.  Painful " supination.     Skin:     Findings: Bruising present.   Neurological:      Mental Status: She is alert.         Labs:  Results for orders placed or performed in visit on 07/08/24 (from the past 24 hour(s))   XR Hand Left G/E 3 Views    Narrative    EXAM: XR HAND LEFT G/E 3 VIEWS, XR WRIST LEFT G/E 3 VIEWS  LOCATION: [facility]  DATE: 7/8/2024    INDICATION:  Fall, initial encounter  COMPARISON: None.      Impression    IMPRESSION: Bones are demineralized. There is severe arthrosis within the thumb CMC joint. Mild scattered IP joint arthrosis throughout the fingers. Chondrocalcinosis about the wrist. No acute displaced fractures involving the wrist identified. There is   some slight bone formation along the distal tuft of the middle finger ulnar cortex which is in keeping with an age-indeterminate fracture. Correlation with tenderness in this area is recommended.     Atherosclerotic vascular calcifications.   XR Wrist Left G/E 3 Views    Narrative    EXAM: XR HAND LEFT G/E 3 VIEWS, XR WRIST LEFT G/E 3 VIEWS  LOCATION: [facility]  DATE: 7/8/2024    INDICATION:  Fall, initial encounter  COMPARISON: None.      Impression    IMPRESSION: Bones are demineralized. There is severe arthrosis within the thumb CMC joint. Mild scattered IP joint arthrosis throughout the fingers. Chondrocalcinosis about the wrist. No acute displaced fractures involving the wrist identified. There is   some slight bone formation along the distal tuft of the middle finger ulnar cortex which is in keeping with an age-indeterminate fracture. Correlation with tenderness in this area is recommended.     Atherosclerotic vascular calcifications.     *Note: Due to a large number of results and/or encounters for the requested time period, some results have not been displayed. A complete set of results can be found in Results Review.       X-Ray was done, my findings are: Xrays reviewed by  myself and independently interpreted.  Any significant discrepancies with official radiologic read, patient will be notified.      [facility] djd      ASSESSMENT:      ICD-10-CM    1. Fall, initial encounter  W19.XXXA XR Hand Left G/E 3 Views     XR Wrist Left G/E 3 Views      2. Contusion of left wrist, initial encounter  S60.212A Wrist/Arm/Hand Bracking Supplies Order Wrist Brace; Left; non-thumb spica           Medical Decision Making:    Differential Diagnosis:  MS Injury Pain: sprain, fracture, and contusion    Serious Comorbid Conditions:  Adult:   reviewed    PLAN:  Due to concerns of a fracture, xray preformed.  Patient placed in wrist splint.  Discussed likely course.  Discussed reasons to seek immediate medical attention.  Additionally if no improvement or worsening in one week, may follow up with PCP and/or UC.        Followup:    If not improving or if condition worsens, follow up with your Primary Care Provider, If not improving or if conditions worsens over the next 12-24 hours, go to the Emergency Department    There are no Patient Instructions on file for this visit.

## 2024-07-09 ENCOUNTER — TELEPHONE (OUTPATIENT)
Dept: URGENT CARE | Facility: URGENT CARE | Age: 84
End: 2024-07-09
Payer: COMMERCIAL

## 2024-07-09 NOTE — TELEPHONE ENCOUNTER
Called to let patient know providers in clinic today would not be able to prescribe anything stronger. Advised patient to talk to PCP.   Jayesh Velasquez

## 2024-07-09 NOTE — TELEPHONE ENCOUNTER
FYI - Status Update    Who is Calling: patient    Update: Pt was seen yesterday and declined pain medication and now is calling would like them as tylenol is not working.      [facility]         Does caller want a call/response back: Yes     Could we send this information to you in [facility] or would you prefer to receive a phone call?:   Patient would prefer a phone call   Okay to leave a detailed message?: Yes at Cell number on file:    Telephone Information:   Mobile [phone]

## 2024-07-14 ENCOUNTER — MYC MEDICAL ADVICE (OUTPATIENT)
Dept: RHEUMATOLOGY | Facility: CLINIC | Age: 84
End: 2024-07-14
Payer: COMMERCIAL

## 2024-07-14 DIAGNOSIS — M35.02 SJOGREN'S SYNDROME WITH LUNG INVOLVEMENT (H): ICD-10-CM

## 2024-07-15 ENCOUNTER — TELEPHONE (OUTPATIENT)
Dept: FAMILY MEDICINE | Facility: CLINIC | Age: 84
End: 2024-07-15
Payer: COMMERCIAL

## 2024-07-15 RX ORDER — CEVIMELINE HYDROCHLORIDE 30 MG/1
30 CAPSULE ORAL 3 TIMES DAILY PRN
Qty: 270 CAPSULE | Refills: 3 | Status: SHIPPED | OUTPATIENT
Start: 2024-07-15

## 2024-07-15 NOTE — TELEPHONE ENCOUNTER
Would not rec xrays without appt/exam- needs both.  I believe they have a walk-in sports medicine clinic at [facility]- can you look into those details and see if she can go there when there with her friend?  Thanks!  CW

## 2024-07-15 NOTE — TELEPHONE ENCOUNTER
CW,  Patient calling to follow-up on recent visit to urgent care.  Was seen on 7/8 for fall.  Today is still having pain in left hand/wrist.  X-ray was clear at time of visit, but patient would like to repeat to see if there is a break that was missed.  Was in clinic with a different patient last week and had requested this, but nurse had refused.  Patient is disappointed by this, and is hoping to be able to address/have x-ray appointment when she goes with friend tomorrow to [facility] for friend's appointment.    Please advise.    Can reach patient at [phone]    Sandra ANTUNEZ RN

## 2024-07-15 NOTE — TELEPHONE ENCOUNTER
JANA,    KP    RN attempted to relay message below.  Informed patient that exam would be needed.  Patient cut RN off before able to finish and informed RN that she is extremely disappointed in clinic/provider.  States she has limited ways to get around and everyone that she talks with says she needs to get another x-ray.  States to RN repeatedly that she wanted RN to let Dr. Tristan know that she is extremely disappointed in care.    RN attempted to get patient's attention to share information on walk-in clinic, but was not able to get a word in before patient disconnected the call.    RN called patient back in attempt to offer below information on walk-in clinic.  Patient thanked RN for calling her back.  Not in position to write down same day clinic information right now.  Will think about calling back.    Sandra ANTUNEZ RN     [facility]: Same Day Access  [address]  [place] [address]  [phone]    Working Hours  Monday: 8:00 AM - 5:00 PM  Tuesday: 8:00 AM - 5:00 PM  Wednesday: 8:00 AM - 5:00 PM  Thursday: 8:00 AM - 5:00 PM  Friday: 8:00 AM - 5:00 PM  Saturday: 12:00 PM - 12:00 PM  Sunday: 8:00 AM - 12:00 PM

## 2024-07-16 ENCOUNTER — ANCILLARY PROCEDURE (OUTPATIENT)
Dept: GENERAL RADIOLOGY | Facility: CLINIC | Age: 84
End: 2024-07-16
Attending: FAMILY MEDICINE
Payer: COMMERCIAL

## 2024-07-16 ENCOUNTER — OFFICE VISIT (OUTPATIENT)
Dept: ORTHOPEDICS | Facility: CLINIC | Age: 84
End: 2024-07-16
Payer: COMMERCIAL

## 2024-07-16 DIAGNOSIS — M18.12 PRIMARY OSTEOARTHRITIS OF FIRST CARPOMETACARPAL JOINT OF LEFT HAND: Primary | ICD-10-CM

## 2024-07-16 DIAGNOSIS — M25.542 ARTHRALGIA OF LEFT HAND: Primary | ICD-10-CM

## 2024-07-16 DIAGNOSIS — M79.642 PAIN OF LEFT HAND: ICD-10-CM

## 2024-07-16 DIAGNOSIS — M25.542 ARTHRALGIA OF LEFT HAND: ICD-10-CM

## 2024-07-16 PROCEDURE — 73130 X-RAY EXAM OF HAND: CPT | Mod: LT | Performed by: RADIOLOGY

## 2024-07-16 PROCEDURE — 99203 OFFICE O/P NEW LOW 30 MIN: CPT | Performed by: FAMILY MEDICINE

## 2024-07-16 NOTE — PROGRESS NOTES
CHIEF COMPLAINT:  Pain of the Left Hand       HISTORY OF PRESENT ILLNESS  Ms. Tee is a pleasant 84 year old year old female who presents to clinic today with left hand/wrist pain.  Betty explains that she had a fall on 7/6/24 when she was at a wedding. She was on her way to a bathroom, she went up a few steps and fell forwards on both knees and the left hand. She did have immediate pain in the left hand. She did ice right away. She had some swelling. She is right hand dominant. She uses a walker to assist in ambulate.     Onset: sudden  Location: left hand  Quality:  aching and sharp  Duration: 1 weeks   Severity: 7/10 at worst  Timing:intermittent episodes   Modifying factors:  resting and non-use makes it better, movement and use makes it worse  Associated signs & symptoms: pain and swelling  Previous similar pain: No  Treatments to date:Tylenol, wrist brace     Additional history: as documented    Review of Systems:  A 10-point review of systems was obtained and is negative except for as noted in the HPI.     MEDICAL HISTORY  Patient Active Problem List   Diagnosis    Temporomandibular joint disorder    Diverticulitis of colon    Osteopenia of multiple sites    Alcohol abuse, in remission    Restless legs syndrome (RLS)    Major depressive disorder, recurrent episode, moderate (H)    Essential hypertension with goal blood pressure less than 140/90    Colouterine fistula    Paroxysmal atrial fibrillation (H)    Left ventricular hypertrophy    Insomnia    Breast fibroadenoma    History of deep vein thrombosis (DVT) of lower extremity    Fatty liver disease, nonalcoholic    Rib pain    Gastroesophageal reflux disease without esophagitis    Enlarged lymph node    Sjogren's syndrome with lung involvement (H)    ANGELICA (obstructive sleep apnea)    ILD (interstitial lung disease) (H)    Lower GI bleed    (HFpEF) heart failure with preserved ejection fraction (H)    Type 2 diabetes mellitus with hyperglycemia, with  long-term current use of insulin (H)    Hyperlipidemia LDL goal <100    Inflammatory arthritis    Follicular bronchiolitis (H)    Stage 3a chronic kidney disease (H)    Urge incontinence of urine    Osteoarthritis of right hip    Seasonal allergic rhinitis due to pollen    Elevated parathyroid hormone    Persistent atrial fibrillation (H)    Vitamin D deficiency    GAVE (gastric antral vascular ectasia)    Iron deficiency anemia, unspecified iron deficiency anemia type    Gout, unspecified cause, unspecified chronicity, unspecified site    Gastroenteritis    Lactic acidosis    Recurrent cold sores    Seronegative rheumatoid arthritis (H)    Polyneuropathy associated with underlying disease (H24)    Age-related osteoporosis without current pathological fracture       Current Outpatient Medications   Medication Sig Dispense Refill    acyclovir (ZOVIRAX) 400 MG tablet Take 1 tablet (400 mg) by mouth every 8 hours For a couple days 15 tablet 2    acyclovir (ZOVIRAX) 5 % external ointment Apply topically as needed (6 times day PRN for outbreaks) As needed for outbreaks 15 g 3    albuterol (PROAIR HFA/PROVENTIL HFA/VENTOLIN HFA) 108 (90 Base) MCG/ACT inhaler Inhale 2 puffs into the lungs every 6 hours as needed for wheezing or shortness of breath      allopurinol (ZYLOPRIM) 100 MG tablet TAKE 1 TABLET BY MOUTH EVERY DAY 90 tablet 1    anakinra (KINERET) 100 MG/0.67ML SOSY injection Inject 0.67 mLs (100 mg) Subcutaneous daily 20.1 mL 3    aspirin (ASA) 81 MG EC tablet Take 1 tablet (81 mg) by mouth 2 times daily 60 tablet 0    atorvastatin (LIPITOR) 10 MG tablet Take 0.5 tablets (5 mg) by mouth daily 45 tablet 2    azithromycin (ZITHROMAX) 250 MG tablet TAKE 1 TABLET BY MOUTH EVERY DAY 30 tablet 11    BD PEN NEEDLE JANE 2ND GEN 32G X 4 MM miscellaneous USE TO TEST 4 TIMES A  each 1    blood glucose (ACCU-CHEK SHANNON PLUS) test strip USE TO TEST BLOOD SUGARS 3 TIMES DAILY 300 strip 1    blood glucose (NO BRAND  SPECIFIED) lancets standard Use to test blood sugar 3 times daily or as directed 100 Lancet 3    BREO ELLIPTA 100-25 MCG/ACT inhaler TAKE 1 PUFF BY MOUTH EVERY DAY 1 each 11    buPROPion (WELLBUTRIN XL) 150 MG 24 hr tablet TAKE 1 TABLET BY MOUTH EVERY MORNING 90 tablet 0    cevimeline (EVOXAC) 30 MG capsule Take 1 capsule (30 mg) by mouth 3 times daily as needed 270 capsule 3    Cholecalciferol (VITAMIN D3) 50 MCG (2000 UT) CAPS TAKE 100 MCG BY MOUTH DAILY (TAKE 2 TABLET (50 MCG) BY MOUTH DAILY - ORAL) 180 capsule 2    COMPOUNDED NON-CONTROLLED SUBSTANCE (CMPD RX) - PHARMACY TO MIX COMPOUNDED MEDICATION Estriol 1 mg/g Apply small amount to finger and apply to inside vagina daily for 2 weeks then twice weekly Route: vaginally Dispense 30 grams 11 refills 30 g 11    cyanocobalamin (VITAMIN B-12) 1000 MCG tablet Take 1 tablet (1,000 mcg) by mouth three times a week      escitalopram (LEXAPRO) 20 MG tablet Take 1 tablet (20 mg) by mouth daily 90 tablet 1    fluticasone (FLONASE) 50 MCG/ACT nasal spray SPRAY 1-2 SPRAYS INTO BOTH NOSTRILS DAILY AS NEEDED FOR ALLERGIES 16 mL 11    hydroxychloroquine (PLAQUENIL) 200 MG tablet TAKE 1 TABLET (200 MG) BY MOUTH DAILY GET ANNUAL EYE EXAMS FOR MONITORING AND FAX [phone] 90 tablet 0    insulin glargine (LANTUS SOLOSTAR) 100 UNIT/ML pen INJECT 15 UNITS SUBCUTANEOUS AT BEDTIME 30 mL 0    insulin lispro (HUMALOG KWIKPEN) 100 UNIT/ML (1 unit dial) KWIKPEN Inject Subcutaneous 3 times daily (before meals) Sliding scale insulin; tests BG three times day  If BG <150, no insulin given   If -200 take 2 units  If -250 take 4 units  If -300 take 6 units  If -350 take 10 units  If BG >350 take 14 units      ketoconazole (NIZORAL) 2 % external cream Apply topically 2 times daily as needed for itching 60 g 1    levocetirizine (XYZAL) 5 MG tablet TAKE 1 TABLET BY MOUTH EVERY DAY IN THE EVENING 90 tablet 1    lidocaine (LIDODERM) 5 % patch APPLY PATCH TO PAINFUL  AREA FOR UP TO 12 H WITHIN A 24 H PERIOD. REMOVE AFTER 12 HOURS. (Patient taking differently: Apply patch to painful area for up to 12 h within a 24 h period.  Remove after 12 hours.) 30 patch 2    loratadine (CLARITIN) 10 MG tablet TAKE 1 TABLET BY MOUTH EVERY DAY 90 tablet 3    methocarbamol (ROBAXIN) 750 MG tablet Take 1 tablet (750 mg) by mouth 3 times daily as needed for muscle spasms 90 tablet 3    metoprolol succinate ER (TOPROL XL) 50 MG 24 hr tablet TAKE 1 TABLET BY MOUTH TWICE A  tablet 3    mirabegron (MYRBETRIQ) 25 MG 24 hr tablet Take 1 tablet (25 mg) by mouth daily 30 tablet 11    montelukast (SINGULAIR) 10 MG tablet TAKE 1 TABLET BY MOUTH EVERYDAY AT BEDTIME 90 tablet 3    pantoprazole (PROTONIX) 40 MG EC tablet Take 1 tablet (40 mg) by mouth daily (replaces famotidine- should stop taking famotidine) 90 tablet 3    polyethylene glycol (MIRALAX) 17 g packet Take 17 g by mouth daily 10 packet 0    potassium chloride ER (KLOR-CON) 20 MEQ CR tablet Take 2 tablets (40 mEq) by mouth every morning AND 1 tablet (20 mEq) every evening. 270 tablet 3    predniSONE (DELTASONE) 10 MG tablet For gout flares, take the prednisone as 40-30-20-10 mg a day, each course x 3 days then go back to 5 mg a day 30 tablet 3    predniSONE (DELTASONE) 5 MG tablet Take 1 tablet (5 mg) by mouth daily 90 tablet 1    Semaglutide, 2 MG/DOSE, (OZEMPIC) 8 MG/3ML pen Inject 2 mg Subcutaneous every 7 days 9 mL 3    torsemide (DEMADEX) 20 MG tablet TAKE 2 TABLETS (40 MG) BY MOUTH EVERY MORNING AND 1 TABLET (20 MG) DAILY AT 2 PM. TAKE THE AFTERNOON DOSE WITH AN EXTRA 10 MG TAB FOR A TOTAL OF 30 MG IN THE AFTERNOON 270 tablet 3    traZODone (DESYREL) 50 MG tablet Take 3 tablets (150 mg) by mouth at bedtime 90 tablet 9       Allergies   Allergen Reactions    Amoxicillin-Pot Clavulanate Nausea and Vomiting    Amoxicillin-Pot Clavulanate     Codeine Nausea and Vomiting     PN: LW Reaction: HIVES    Penicillins Nausea and Vomiting     PN:  LW Reaction: GI Upset    Phenobarbital Itching    Seasonal Allergies        Family History   Problem Relation Age of Onset    C.A.D. Mother 63        MI- first at age 63    Heart Disease Mother     Hypertension Mother     Cerebrovascular Disease Mother     Hyperlipidemia Mother     Coronary Artery Disease Mother         MI-first at age 63    Other - See Comments Mother         cerebrovascular disease     Alcohol/Drug Father     Alzheimer Disease Father     Dementia Father     Hypertension Father     Hyperlipidemia Father     Substance Abuse Father     Diabetes Sister     Hypertension Sister     Hyperlipidemia Sister     Substance Abuse Sister     Asthma Sister     C.A.D. Sister 52        Minor MI- age 50's    Heart Disease Sister     Hypertension Sister     Hypertension Sister     Cancer - colorectal Sister 48        Late 40's early 50's    Gastrointestinal Disease Sister         Diverticulitis    Lipids Sister     Lipids Sister     Diabetes Sister     Heart Disease Sister         CHF    Cancer Sister         lung, smoker    Substance Abuse Sister     Asthma Sister     Cancer Sister     Coronary Artery Disease Sister         minor MI-age 50's    Heart Disease Sister     Hypertension Sister     Hypertension Sister     Colon Cancer Sister     Diverticulitis Sister     Lung Cancer Sister     Heart Disease Sister         CHF    Diabetes Sister     Asthma Sister     Hypertension Brother     Prostate Cancer Brother 74        Dx'd age 74    Gastrointestinal Disease Brother         Diverticulitis    Parkinsonism Brother     Substance Abuse Brother     Prostate Cancer Brother     Hyperlipidemia Brother     Hypertension Brother     Prostate Cancer Brother     Diverticulitis Brother     Parkinsonism Brother     Breast Cancer Daughter     Breast Cancer Daughter     Diabetes Other     Hypertension Other     Anesthesia Reaction No family hx of     Deep Vein Thrombosis (DVT) No family hx of        Additional  "medical/Social/Surgical histories reviewed in [facility] and updated as appropriate.       PHYSICAL EXAM  LMP  (LMP Unknown)     General: AOX4, NAD  Musculoskeletal - Left wrist and hand examination  Inspection: No joint deformities, trace swelling over carpal region of hand, resolved finger swelling.  Palpation: Tenderness to palpation overlying third metacarpal or phalanges.  No tenderness of radius, ulna, wrist joint, or scaphoid. Tenderness ++ at CMC joint.  Range of Motion: Normal wrist flexion, extension, radial and ulnar deviation compared to contralateral side.  Normal range of motion of all fingers of both hands.  No rotational deformity or scissoring present   Strength: Full 5/5 strength of wrist extension (C6) and extension (C7), \"okay\" sign, thumb opposition, and hand intrinsics.  5/5  strength as compared to contralateral side.  Special Tests:  (-) Tinel Sign  (-) Phalen s Test  (-) Carpal compression Test  (-) Del Cid Shift test  (-) Finkelstein s test  (-) TFCC grind test  Neurologic: Sensation is grossly intact throughout hand and digits.    Vascular: 2+ radial pulse.  Extremities are warm, well perfused.     IMAGING : XR left hand 3 views. Final results and radiologist's interpretation, available in the [facility] health record. Images were reviewed with the patient/family members in the office today. My personal interpretation of the performed imaging is no acute osseous abnormalities.  Severe CMC osteoarthritis of left thumb, scattered IP joint osteoarthritis her fingers.     ASSESSMENT & PLAN  Ms. Tee is a 84 year old year old female who presents to clinic today with acute pain and swelling of left wrist and hand after a FOOSH injury 1 week ago.  X-rays on 7/8/2024 as well as repeat x-rays today are unremarkable for acute process, significant degenerative changes noted however.    Initial x-rays with possible age-indeterminate fracture at the distal tuft of middle finger, this is nontender " today.    Diagnosis:   1.  Acute pain of left hand  2.  CMC osteoarthritis of left hand    Treatment options discussed including rest, ice, elevation, use of compression with a wrist brace.  Also discussed Comfort Cool thumb splint which she can transition to and especially given she has had CMC pain in the past.    Briefly we did discuss Voltaren gel, but ultimately decided against trying as she has utilized this before without efficacy.  Lastly we discussed she can remove the brace that she feels comfortable and use it when pain does flare.    If persisting in the next 4 to 6 weeks, we may consider corticosteroid injection to the CMC joint or repeating imaging.    It was a pleasure seeing Betty today.    William El DO, CAQSM  Primary Care Sports Medicine

## 2024-07-16 NOTE — LETTER
7/16/2024      RE: Betty Tee  [address]  [place] [address]     Dear Colleague,    Thank you for referring your patient, Betty Tee, to the [facility] [place]. Please see a copy of my visit note below.    CHIEF COMPLAINT:  Pain of the Left Hand       HISTORY OF PRESENT ILLNESS  Ms. Tee is a pleasant 84 year old year old female who presents to clinic today with left hand/wrist pain.  Betty explains that she had a fall on 7/6/24 when she was at a wedding. She was on her way to a bathroom, she went up a few steps and fell forwards on both knees and the left hand. She did have immediate pain in the left hand. She did ice right away. She had some swelling. She is right hand dominant. She uses a walker to assist in ambulate.     Onset: sudden  Location: left hand  Quality:  aching and sharp  Duration: 1 weeks   Severity: 7/10 at worst  Timing:intermittent episodes   Modifying factors:  resting and non-use makes it better, movement and use makes it worse  Associated signs & symptoms: pain and swelling  Previous similar pain: No  Treatments to date:Tylenol, wrist brace     Additional history: as documented    Review of Systems:  A 10-point review of systems was obtained and is negative except for as noted in the HPI.     MEDICAL HISTORY  Patient Active Problem List   Diagnosis     Temporomandibular joint disorder     Diverticulitis of colon     Osteopenia of multiple sites     Alcohol abuse, in remission     Restless legs syndrome (RLS)     Major depressive disorder, recurrent episode, moderate (H)     Essential hypertension with goal blood pressure less than 140/90     Colouterine fistula     Paroxysmal atrial fibrillation (H)     Left ventricular hypertrophy     Insomnia     Breast fibroadenoma     History of deep vein thrombosis (DVT) of lower extremity     Fatty liver disease, nonalcoholic     Rib pain     Gastroesophageal reflux disease without  esophagitis     Enlarged lymph node     Sjogren's syndrome with lung involvement (H)     ANGELICA (obstructive sleep apnea)     ILD (interstitial lung disease) (H)     Lower GI bleed     (HFpEF) heart failure with preserved ejection fraction (H)     Type 2 diabetes mellitus with hyperglycemia, with long-term current use of insulin (H)     Hyperlipidemia LDL goal <100     Inflammatory arthritis     Follicular bronchiolitis (H)     Stage 3a chronic kidney disease (H)     Urge incontinence of urine     Osteoarthritis of right hip     Seasonal allergic rhinitis due to pollen     Elevated parathyroid hormone     Persistent atrial fibrillation (H)     Vitamin D deficiency     GAVE (gastric antral vascular ectasia)     Iron deficiency anemia, unspecified iron deficiency anemia type     Gout, unspecified cause, unspecified chronicity, unspecified site     Gastroenteritis     Lactic acidosis     Recurrent cold sores     Seronegative rheumatoid arthritis (H)     Polyneuropathy associated with underlying disease (H24)     Age-related osteoporosis without current pathological fracture       Current Outpatient Medications   Medication Sig Dispense Refill     acyclovir (ZOVIRAX) 400 MG tablet Take 1 tablet (400 mg) by mouth every 8 hours For a couple days 15 tablet 2     acyclovir (ZOVIRAX) 5 % external ointment Apply topically as needed (6 times day PRN for outbreaks) As needed for outbreaks 15 g 3     albuterol (PROAIR HFA/PROVENTIL HFA/VENTOLIN HFA) 108 (90 Base) MCG/ACT inhaler Inhale 2 puffs into the lungs every 6 hours as needed for wheezing or shortness of breath       allopurinol (ZYLOPRIM) 100 MG tablet TAKE 1 TABLET BY MOUTH EVERY DAY 90 tablet 1     anakinra (KINERET) 100 MG/0.67ML SOSY injection Inject 0.67 mLs (100 mg) Subcutaneous daily 20.1 mL 3     aspirin (ASA) 81 MG EC tablet Take 1 tablet (81 mg) by mouth 2 times daily 60 tablet 0     atorvastatin (LIPITOR) 10 MG tablet Take 0.5 tablets (5 mg) by mouth daily 45  tablet 2     azithromycin (ZITHROMAX) 250 MG tablet TAKE 1 TABLET BY MOUTH EVERY DAY 30 tablet 11     BD PEN NEEDLE JANE 2ND GEN 32G X 4 MM miscellaneous USE TO TEST 4 TIMES A  each 1     blood glucose (ACCU-CHEK SHANNON PLUS) test strip USE TO TEST BLOOD SUGARS 3 TIMES DAILY 300 strip 1     blood glucose (NO BRAND SPECIFIED) lancets standard Use to test blood sugar 3 times daily or as directed 100 Lancet 3     BREO ELLIPTA 100-25 MCG/ACT inhaler TAKE 1 PUFF BY MOUTH EVERY DAY 1 each 11     buPROPion (WELLBUTRIN XL) 150 MG 24 hr tablet TAKE 1 TABLET BY MOUTH EVERY MORNING 90 tablet 0     cevimeline (EVOXAC) 30 MG capsule Take 1 capsule (30 mg) by mouth 3 times daily as needed 270 capsule 3     Cholecalciferol (VITAMIN D3) 50 MCG (2000 UT) CAPS TAKE 100 MCG BY MOUTH DAILY (TAKE 2 TABLET (50 MCG) BY MOUTH DAILY - ORAL) 180 capsule 2     COMPOUNDED NON-CONTROLLED SUBSTANCE (CMPD RX) - PHARMACY TO MIX COMPOUNDED MEDICATION Estriol 1 mg/g Apply small amount to finger and apply to inside vagina daily for 2 weeks then twice weekly Route: vaginally Dispense 30 grams 11 refills 30 g 11     cyanocobalamin (VITAMIN B-12) 1000 MCG tablet Take 1 tablet (1,000 mcg) by mouth three times a week       escitalopram (LEXAPRO) 20 MG tablet Take 1 tablet (20 mg) by mouth daily 90 tablet 1     fluticasone (FLONASE) 50 MCG/ACT nasal spray SPRAY 1-2 SPRAYS INTO BOTH NOSTRILS DAILY AS NEEDED FOR ALLERGIES 16 mL 11     hydroxychloroquine (PLAQUENIL) 200 MG tablet TAKE 1 TABLET (200 MG) BY MOUTH DAILY GET ANNUAL EYE EXAMS FOR MONITORING AND FAX -[phone] 90 tablet 0     insulin glargine (LANTUS SOLOSTAR) 100 UNIT/ML pen INJECT 15 UNITS SUBCUTANEOUS AT BEDTIME 30 mL 0     insulin lispro (HUMALOG KWIKPEN) 100 UNIT/ML (1 unit dial) KWIKPEN Inject Subcutaneous 3 times daily (before meals) Sliding scale insulin; tests BG three times day  If BG <150, no insulin given   If -200 take 2 units  If -250 take 4 units  If -300  take 6 units  If -350 take 10 units  If BG >350 take 14 units       ketoconazole (NIZORAL) 2 % external cream Apply topically 2 times daily as needed for itching 60 g 1     levocetirizine (XYZAL) 5 MG tablet TAKE 1 TABLET BY MOUTH EVERY DAY IN THE EVENING 90 tablet 1     lidocaine (LIDODERM) 5 % patch APPLY PATCH TO PAINFUL AREA FOR UP TO 12 H WITHIN A 24 H PERIOD. REMOVE AFTER 12 HOURS. (Patient taking differently: Apply patch to painful area for up to 12 h within a 24 h period.  Remove after 12 hours.) 30 patch 2     loratadine (CLARITIN) 10 MG tablet TAKE 1 TABLET BY MOUTH EVERY DAY 90 tablet 3     methocarbamol (ROBAXIN) 750 MG tablet Take 1 tablet (750 mg) by mouth 3 times daily as needed for muscle spasms 90 tablet 3     metoprolol succinate ER (TOPROL XL) 50 MG 24 hr tablet TAKE 1 TABLET BY MOUTH TWICE A  tablet 3     mirabegron (MYRBETRIQ) 25 MG 24 hr tablet Take 1 tablet (25 mg) by mouth daily 30 tablet 11     montelukast (SINGULAIR) 10 MG tablet TAKE 1 TABLET BY MOUTH EVERYDAY AT BEDTIME 90 tablet 3     pantoprazole (PROTONIX) 40 MG EC tablet Take 1 tablet (40 mg) by mouth daily (replaces famotidine- should stop taking famotidine) 90 tablet 3     polyethylene glycol (MIRALAX) 17 g packet Take 17 g by mouth daily 10 packet 0     potassium chloride ER (KLOR-CON) 20 MEQ CR tablet Take 2 tablets (40 mEq) by mouth every morning AND 1 tablet (20 mEq) every evening. 270 tablet 3     predniSONE (DELTASONE) 10 MG tablet For gout flares, take the prednisone as 40-30-20-10 mg a day, each course x 3 days then go back to 5 mg a day 30 tablet 3     predniSONE (DELTASONE) 5 MG tablet Take 1 tablet (5 mg) by mouth daily 90 tablet 1     Semaglutide, 2 MG/DOSE, (OZEMPIC) 8 MG/3ML pen Inject 2 mg Subcutaneous every 7 days 9 mL 3     torsemide (DEMADEX) 20 MG tablet TAKE 2 TABLETS (40 MG) BY MOUTH EVERY MORNING AND 1 TABLET (20 MG) DAILY AT 2 PM. TAKE THE AFTERNOON DOSE WITH AN EXTRA 10 MG TAB FOR A TOTAL OF 30  MG IN THE AFTERNOON 270 tablet 3     traZODone (DESYREL) 50 MG tablet Take 3 tablets (150 mg) by mouth at bedtime 90 tablet 9       Allergies   Allergen Reactions     Amoxicillin-Pot Clavulanate Nausea and Vomiting     Amoxicillin-Pot Clavulanate      Codeine Nausea and Vomiting     PN: LW Reaction: HIVES     Penicillins Nausea and Vomiting     PN: LW Reaction: GI Upset     Phenobarbital Itching     Seasonal Allergies        Family History   Problem Relation Age of Onset     C.A.D. Mother 63        MI- first at age 63     Heart Disease Mother      Hypertension Mother      Cerebrovascular Disease Mother      Hyperlipidemia Mother      Coronary Artery Disease Mother         MI-first at age 63     Other - See Comments Mother         cerebrovascular disease      Alcohol/Drug Father      Alzheimer Disease Father      Dementia Father      Hypertension Father      Hyperlipidemia Father      Substance Abuse Father      Diabetes Sister      Hypertension Sister      Hyperlipidemia Sister      Substance Abuse Sister      Asthma Sister      C.A.D. Sister 52        Minor MI- age 50's     Heart Disease Sister      Hypertension Sister      Hypertension Sister      Cancer - colorectal Sister 48        Late 40's early 50's     Gastrointestinal Disease Sister         Diverticulitis     Lipids Sister      Lipids Sister      Diabetes Sister      Heart Disease Sister         CHF     Cancer Sister         lung, smoker     Substance Abuse Sister      Asthma Sister      Cancer Sister      Coronary Artery Disease Sister         minor MI-age 50's     Heart Disease Sister      Hypertension Sister      Hypertension Sister      Colon Cancer Sister      Diverticulitis Sister      Lung Cancer Sister      Heart Disease Sister         CHF     Diabetes Sister      Asthma Sister      Hypertension Brother      Prostate Cancer Brother 74        Dx'd age 74     Gastrointestinal Disease Brother         Diverticulitis     Parkinsonism Brother       "Substance Abuse Brother      Prostate Cancer Brother      Hyperlipidemia Brother      Hypertension Brother      Prostate Cancer Brother      Diverticulitis Brother      Parkinsonism Brother      Breast Cancer Daughter      Breast Cancer Daughter      Diabetes Other      Hypertension Other      Anesthesia Reaction No family hx of      Deep Vein Thrombosis (DVT) No family hx of        Additional medical/Social/Surgical histories reviewed in [facility] and updated as appropriate.       PHYSICAL EXAM  LMP  (LMP Unknown)     General: AOX4, NAD  Musculoskeletal - Left wrist and hand examination  Inspection: No joint deformities, trace swelling over carpal region of hand, resolved finger swelling.  Palpation: Tenderness to palpation overlying third metacarpal or phalanges.  No tenderness of radius, ulna, wrist joint, or scaphoid. Tenderness ++ at CMC joint.  Range of Motion: Normal wrist flexion, extension, radial and ulnar deviation compared to contralateral side.  Normal range of motion of all fingers of both hands.  No rotational deformity or scissoring present   Strength: Full 5/5 strength of wrist extension (C6) and extension (C7), \"okay\" sign, thumb opposition, and hand intrinsics.  5/5  strength as compared to contralateral side.  Special Tests:  (-) Tinel Sign  (-) Phalen s Test  (-) Carpal compression Test  (-) Del Cid Shift test  (-) Finkelstein s test  (-) TFCC grind test  Neurologic: Sensation is grossly intact throughout hand and digits.    Vascular: 2+ radial pulse.  Extremities are warm, well perfused.     IMAGING : XR left hand 3 views. Final results and radiologist's interpretation, available in the [facility] health record. Images were reviewed with the patient/family members in the office today. My personal interpretation of the performed imaging is no acute osseous abnormalities.  Severe CMC osteoarthritis of left thumb, scattered IP joint osteoarthritis her fingers.     ASSESSMENT & PLAN  Ms. Tee is a 84 " year old year old female who presents to clinic today with acute pain and swelling of left wrist and hand after a FOOSH injury 1 week ago.  X-rays on 7/8/2024 as well as repeat x-rays today are unremarkable for acute process, significant degenerative changes noted however.    Initial x-rays with possible age-indeterminate fracture at the distal tuft of middle finger, this is nontender today.    Diagnosis:   1.  Acute pain of left hand  2.  CMC osteoarthritis of left hand    Treatment options discussed including rest, ice, elevation, use of compression with a wrist brace.  Also discussed Comfort Cool thumb splint which she can transition to and especially given she has had CMC pain in the past.    Briefly we did discuss Voltaren gel, but ultimately decided against trying as she has utilized this before without efficacy.  Lastly we discussed she can remove the brace that she feels comfortable and use it when pain does flare.    If persisting in the next 4 to 6 weeks, we may consider corticosteroid injection to the CMC joint or repeating imaging.    It was a pleasure seeing Betty today.    William El DO, [facility]  Primary Care Sports Medicine      Again, thank you for allowing me to participate in the care of your patient.      Sincerely,    William El DO

## 2024-07-23 ENCOUNTER — TELEPHONE (OUTPATIENT)
Dept: RHEUMATOLOGY | Facility: CLINIC | Age: 84
End: 2024-07-23
Payer: COMMERCIAL

## 2024-07-23 NOTE — TELEPHONE ENCOUNTER
Patient confirmed scheduled appointment:  Date: September 19th 2024  Time: 3pm  Visit type: Return Rheumatology Video  Provider: Dr. Lopez  Location: [facility]  Testing/imaging: NA  Additional notes: Rescheduled from 8/28

## 2024-07-24 ENCOUNTER — TELEPHONE (OUTPATIENT)
Dept: FAMILY MEDICINE | Facility: CLINIC | Age: 84
End: 2024-07-24
Payer: COMMERCIAL

## 2024-07-24 DIAGNOSIS — E11.65 TYPE 2 DIABETES MELLITUS WITH HYPERGLYCEMIA, WITH LONG-TERM CURRENT USE OF INSULIN (H): Primary | ICD-10-CM

## 2024-07-24 DIAGNOSIS — Z79.4 TYPE 2 DIABETES MELLITUS WITH HYPERGLYCEMIA, WITH LONG-TERM CURRENT USE OF INSULIN (H): Primary | ICD-10-CM

## 2024-07-24 NOTE — TELEPHONE ENCOUNTER
CW, NADIA ([facility]),    Patient calling,    Patient requesting contour next lancets be ordered to pair with Diabetes equipment.  Patient currently at pharmacy and would like right away.    Called pharmacy,    Stating that a normal lancet order will be ok to pair with already ordered meter and test strips.  Pended lancets    Thanks,  Pasquale ANTUNEZ RN

## 2024-07-28 DIAGNOSIS — M06.00 SERONEGATIVE RHEUMATOID ARTHRITIS (H): ICD-10-CM

## 2024-07-28 DIAGNOSIS — M10.9 ACUTE GOUT OF LEFT FOOT, UNSPECIFIED CAUSE: ICD-10-CM

## 2024-08-01 RX ORDER — PREDNISONE 5 MG/1
5 TABLET ORAL DAILY
Qty: 90 TABLET | Refills: 1 | OUTPATIENT
Start: 2024-08-01

## 2024-08-03 ENCOUNTER — HEALTH MAINTENANCE LETTER (OUTPATIENT)
Age: 84
End: 2024-08-03

## 2024-08-16 ENCOUNTER — OFFICE VISIT (OUTPATIENT)
Dept: PULMONOLOGY | Facility: CLINIC | Age: 84
End: 2024-08-16
Attending: INTERNAL MEDICINE
Payer: COMMERCIAL

## 2024-08-16 VITALS
WEIGHT: 179 LBS | SYSTOLIC BLOOD PRESSURE: 152 MMHG | DIASTOLIC BLOOD PRESSURE: 75 MMHG | OXYGEN SATURATION: 92 % | HEART RATE: 55 BPM | HEIGHT: 65 IN | BODY MASS INDEX: 29.82 KG/M2

## 2024-08-16 DIAGNOSIS — J44.89 FOLLICULAR BRONCHIOLITIS (H): ICD-10-CM

## 2024-08-16 DIAGNOSIS — J44.89 FOLLICULAR BRONCHIOLITIS (H): Primary | ICD-10-CM

## 2024-08-16 LAB
6 MIN WALK (FT): 450 FT
6 MIN WALK (M): 137 M

## 2024-08-16 PROCEDURE — 94618 PULMONARY STRESS TESTING: CPT | Performed by: INTERNAL MEDICINE

## 2024-08-16 PROCEDURE — 99214 OFFICE O/P EST MOD 30 MIN: CPT | Mod: 25 | Performed by: INTERNAL MEDICINE

## 2024-08-16 PROCEDURE — 94375 RESPIRATORY FLOW VOLUME LOOP: CPT | Performed by: INTERNAL MEDICINE

## 2024-08-16 PROCEDURE — 94726 PLETHYSMOGRAPHY LUNG VOLUMES: CPT | Performed by: INTERNAL MEDICINE

## 2024-08-16 PROCEDURE — G0463 HOSPITAL OUTPT CLINIC VISIT: HCPCS | Performed by: INTERNAL MEDICINE

## 2024-08-16 PROCEDURE — 94729 DIFFUSING CAPACITY: CPT | Performed by: INTERNAL MEDICINE

## 2024-08-16 ASSESSMENT — PAIN SCALES - GENERAL: PAINLEVEL: NO PAIN (0)

## 2024-08-16 NOTE — NURSING NOTE
Chief Complaint   Patient presents with    Interstitial Lung Disease (ILD)     3 month follow up      Vitals were taken and medications were reconciled.    Mary Alice Murray RMA  11:45 AM

## 2024-08-16 NOTE — PROGRESS NOTES
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  She had mild ILD on a previous chest CT scan in 2015, but follow-up chest CT scan in 2018 showed improvement with no obvious ILD present at that time, but the follicular bronchiolitis changes were still present.  She has been on prednisone for her Sjogren's for many years, and that is managed by Dr. Lopez in rheumatology. Her medications for follicular bronchiolitis include prednisone dose 5 mg daily, azithromycin 250 mg daily, montelukast daily, and Breo Ellipta inhaler 100-25. These are all anti-inflammatories for her follicular bronchiolitis.     Today, Sister Betty is in clinic with her friend.  She reports that her dyspnea on exertion feels about the same.  She does not walk stairs at the home, but she does feel a little short of breath when she walks longer distances.  She endorses occasional dry cough that is not bothersome.  She denies fevers.  She is not exercising.    She was recently diagnosed with gout in her left hand and has started treatment, and her hand feels better.  She continues to use CPAP at nighttime for sleep apnea.  She is no longer on Bupropion, which had really sedated her at her last visit.            Current Outpatient Medications   Medication Sig Dispense Refill    acyclovir (ZOVIRAX) 400 MG tablet Take 1 tablet (400 mg) by mouth every 8 hours For a couple days 15 tablet 2    acyclovir (ZOVIRAX) 5 % external ointment Apply topically as needed (6 times day PRN for outbreaks) As needed for outbreaks 15 g 3    albuterol (PROAIR HFA/PROVENTIL HFA/VENTOLIN HFA) 108 (90 Base) MCG/ACT inhaler Inhale 2 puffs into the lungs every 6 hours as needed for wheezing or  shortness of breath      allopurinol (ZYLOPRIM) 100 MG tablet TAKE 1 TABLET BY MOUTH EVERY DAY 90 tablet 1    anakinra (KINERET) 100 MG/0.67ML SOSY injection Inject 0.67 mLs (100 mg) Subcutaneous daily 20.1 mL 3    aspirin (ASA) 81 MG EC tablet Take 1 tablet (81 mg) by mouth 2 times daily 60 tablet 0    atorvastatin (LIPITOR) 10 MG tablet Take 0.5 tablets (5 mg) by mouth daily 45 tablet 2    azithromycin (ZITHROMAX) 250 MG tablet TAKE 1 TABLET BY MOUTH EVERY DAY 30 tablet 11    BD PEN NEEDLE JANE 2ND GEN 32G X 4 MM miscellaneous USE TO TEST 4 TIMES A  each 1    blood glucose (ACCU-CHEK SHANNON PLUS) test strip USE TO TEST BLOOD SUGARS 3 TIMES DAILY 300 strip 1    blood glucose (NO BRAND SPECIFIED) lancets standard Use to test blood sugar 3 times daily or as directed 100 Lancet 3    BREO ELLIPTA 100-25 MCG/ACT inhaler TAKE 1 PUFF BY MOUTH EVERY DAY 1 each 11    buPROPion (WELLBUTRIN XL) 150 MG 24 hr tablet TAKE 1 TABLET BY MOUTH EVERY MORNING 90 tablet 0    cevimeline (EVOXAC) 30 MG capsule Take 1 capsule (30 mg) by mouth 3 times daily as needed 270 capsule 3    Cholecalciferol (VITAMIN D3) 50 MCG (2000 UT) CAPS TAKE 100 MCG BY MOUTH DAILY (TAKE 2 TABLET (50 MCG) BY MOUTH DAILY - ORAL) 180 capsule 2    COMPOUNDED NON-CONTROLLED SUBSTANCE (CMPD RX) - PHARMACY TO MIX COMPOUNDED MEDICATION Estriol 1 mg/g Apply small amount to finger and apply to inside vagina daily for 2 weeks then twice weekly Route: vaginally Dispense 30 grams 11 refills 30 g 11    cyanocobalamin (VITAMIN B-12) 1000 MCG tablet Take 1 tablet (1,000 mcg) by mouth three times a week      escitalopram (LEXAPRO) 20 MG tablet Take 1 tablet (20 mg) by mouth daily 90 tablet 1    fluticasone (FLONASE) 50 MCG/ACT nasal spray SPRAY 1-2 SPRAYS INTO BOTH NOSTRILS DAILY AS NEEDED FOR ALLERGIES 16 mL 11    hydroxychloroquine (PLAQUENIL) 200 MG tablet TAKE 1 TABLET (200 MG) BY MOUTH DAILY GET ANNUAL EYE EXAMS FOR MONITORING AND FAX [phone] 90 tablet 0     insulin glargine (LANTUS SOLOSTAR) 100 UNIT/ML pen INJECT 15 UNITS SUBCUTANEOUS AT BEDTIME 30 mL 0    insulin lispro (HUMALOG KWIKPEN) 100 UNIT/ML (1 unit dial) KWIKPEN Inject Subcutaneous 3 times daily (before meals) Sliding scale insulin; tests BG three times day  If BG <150, no insulin given   If -200 take 2 units  If -250 take 4 units  If -300 take 6 units  If -350 take 10 units  If BG >350 take 14 units      ketoconazole (NIZORAL) 2 % external cream Apply topically 2 times daily as needed for itching 60 g 1    levocetirizine (XYZAL) 5 MG tablet TAKE 1 TABLET BY MOUTH EVERY DAY IN THE EVENING 90 tablet 1    lidocaine (LIDODERM) 5 % patch APPLY PATCH TO PAINFUL AREA FOR UP TO 12 H WITHIN A 24 H PERIOD. REMOVE AFTER 12 HOURS. (Patient taking differently: Apply patch to painful area for up to 12 h within a 24 h period.  Remove after 12 hours.) 30 patch 2    loratadine (CLARITIN) 10 MG tablet TAKE 1 TABLET BY MOUTH EVERY DAY 90 tablet 3    methocarbamol (ROBAXIN) 750 MG tablet Take 1 tablet (750 mg) by mouth 3 times daily as needed for muscle spasms 90 tablet 3    metoprolol succinate ER (TOPROL XL) 50 MG 24 hr tablet TAKE 1 TABLET BY MOUTH TWICE A  tablet 3    mirabegron (MYRBETRIQ) 25 MG 24 hr tablet Take 1 tablet (25 mg) by mouth daily 30 tablet 11    montelukast (SINGULAIR) 10 MG tablet TAKE 1 TABLET BY MOUTH EVERYDAY AT BEDTIME 90 tablet 3    pantoprazole (PROTONIX) 40 MG EC tablet Take 1 tablet (40 mg) by mouth daily (replaces famotidine- should stop taking famotidine) 90 tablet 3    polyethylene glycol (MIRALAX) 17 g packet Take 17 g by mouth daily 10 packet 0    potassium chloride ER (KLOR-CON) 20 MEQ CR tablet Take 2 tablets (40 mEq) by mouth every morning AND 1 tablet (20 mEq) every evening. 270 tablet 3    predniSONE (DELTASONE) 10 MG tablet For gout flares, take the prednisone as 40-30-20-10 mg a day, each course x 3 days then go back to 5 mg a day 30 tablet 3    predniSONE  (DELTASONE) 5 MG tablet Take 1 tablet (5 mg) by mouth daily 90 tablet 1    Semaglutide, 2 MG/DOSE, (OZEMPIC) 8 MG/3ML pen Inject 2 mg Subcutaneous every 7 days 9 mL 3    torsemide (DEMADEX) 20 MG tablet TAKE 2 TABLETS (40 MG) BY MOUTH EVERY MORNING AND 1 TABLET (20 MG) DAILY AT 2 PM. TAKE THE AFTERNOON DOSE WITH AN EXTRA 10 MG TAB FOR A TOTAL OF 30 MG IN THE AFTERNOON 270 tablet 3    traZODone (DESYREL) 50 MG tablet Take 3 tablets (150 mg) by mouth at bedtime 90 tablet 9     Current Facility-Administered Medications   Medication Dose Route Frequency Provider Last Rate Last Admin    [START ON 12/10/2024] denosumab (PROLIA) injection 60 mg  60 mg Subcutaneous Q6 Months          Allergies   Allergen Reactions    Amoxicillin-Pot Clavulanate Nausea and Vomiting    Amoxicillin-Pot Clavulanate     Codeine Nausea and Vomiting     PN: LW Reaction: HIVES    Penicillins Nausea and Vomiting     PN: LW Reaction: GI Upset    Phenobarbital Itching    Seasonal Allergies      Past Medical History:   Diagnosis Date    Alcohol abuse, in remission     Allergic rhinitis, cause unspecified     allegra helps when she takes it    Antiplatelet or antithrombotic long-term use     Atrial fibrillation (H)     in hosp in 11/11 after surgery w/ fluid overload    Cardiomegaly     LVH on stress echo- cardiac w/u at [facility] ER- neg CT scan for PE, neg stress echo in 8/06    Chest pain, unspecified     Congestive heart failure (H)     Depressive disorder     Diabetes (H)     Disorder of bone and cartilage, unspecified     osteopenia (had been on prempro), improved on 6/06 dexa, stable dexa 11/10    Diverticulosis of colon (without mention of hemorrhage)     last episode yrs ago    Essential hypertension, benign     Follicular bronchiolitis (H)     associated with Sjogrens, dx by chest CT showing mosaic attenuation and air trapping    Gastro-oesophageal reflux disease     ILD (interstitial lung disease) (H)     associated with Sjogrens, also has mildly  elevated IgG4, first noted on chest CT 2015 (mild changes) and also has small airways disease; ILD improved on follow up chest CT 2018.    Insomnia, unspecified     weaned off clonazepam    Irregular heart beat     Lumbago 07/2009    MRI with DJD, now seeing Dr. Cain for sciatic sx's    Major depressive disorder, recurrent episode, moderate (H)     Obstructive sleep apnea     uses cpap    Osteoarthrosis, unspecified whether generalized or localized, unspecified site     Sjogren's syndrome (H24)     + RG and SSA and lip bx    Sleep apnea     uses a cpap machine    Tobacco use disorder     chantix in 9/07, started again in 6/08, working       Past Surgical History:   Procedure Laterality Date    APPENDECTOMY      ARTHROPLASTY HIP Right 5/31/2023    Procedure: Right total hip arthroplasty;  Surgeon: Thomas Shannon MD;  Location:  OR    BACK SURGERY  1962    BACK SURGERY  1962    BIOPSY BREAST  09/27/2002    Biopsy Left Breast    BIOPSY BREAST Left 09/27/2002    BREAST SURGERY      CARDIAC SURGERY      CARDIAC SURGERY      CHOLECYSTECTOMY  1990's?    CHOLECYSTECTOMY      COLECTOMY LEFT  11/07/2011    Procedure:COLECTOMY LEFT; Laparoscopic mobilization of splenic flexture, sigmoid colectomy, coloprotoscopy, loop illeostomy; Surgeon:CK CASTANEDA; Location: OR    COLECTOMY LEFT  11/07/2011    COLONOSCOPY  1990,s    COLONOSCOPY N/A 5/3/2022    Procedure: COLONOSCOPY;  Surgeon: Guru Winsome Dias MD;  Location:  GI    CV LEFT ATRIAL APPENDAGE CLOSURE N/A 9/26/2022    Procedure: Left Atrial Appendage Closure;  Surgeon: Uzair Crews MD;  Location:  HEART CARDIAC CATH LAB    ENT SURGERY      EYE SURGERY  2012    FLEXIBLE SIGMOIDOSCOPY  11/03/2011    HYSTERECTOMY TOTAL ABDOMINAL, BILATERAL SALPINGO-OOPHORECTOMY, COMBINED  11/07/2011    Procedure:COMBINED HYSTERECTOMY TOTAL ABDOMINAL, BILATERAL SALPINGO-OOPHORECTOMY; total abdominal hysterectomy, bilateral salpingo-oophorectomy;  Surgeon:ALETA MANUEL; Location:[facility] OR    HYSTERECTOMY TOTAL ABDOMINAL, BILATERAL SALPINGO-OOPHORECTOMY, COMBINED  2011    ILEOSTOMY  2012    takedown loop ileostomy     INSERT STENT URETER  2011    Procedure:INSERT STENT URETER; Placement of Bilateral Ureteral Stents ; Surgeon:PRANEETH BRYANT; Location:[facility] OR    SIGMOIDECTOMY  2012    Dr. Siddiqi, Sigmoidectomy for diverticular abscess, iliostomy afterwards until repair    SIGMOIDOSCOPY FLEXIBLE  2011    Procedure:SIGMOIDOSCOPY FLEXIBLE; Flexible Sigmoidoscopy; Surgeon:CK SIDDIQI; Location:[facility] OR    TAKEDOWN ILEOSTOMY  2012    Procedure:TAKEDOWN ILEOSTOMY; Takedown Loop Ileostomy ; Surgeon:CK SIDDIQI; Location:[facility] OR    URETERAL STENT PLACEMENT  2011    ZZC APPENDECTOMY  1970s?    ZZC NONSPECIFIC PROCEDURE  2005    exploratory abd lap, adhesions, resolved RLQ pain, diverticulitis episodes       Social History     Socioeconomic History    Marital status: Single     Spouse name: Not on file    Number of children: 0    Years of education: Ed Spec De    Highest education level: Not on file   Occupational History    Occupation: Professor     Employer: [employer]     Comment: [employer]- Education   Tobacco Use    Smoking status: Former     Current packs/day: 0.00     Average packs/day: 0.5 packs/day for 41.6 years (20.8 ttl pk-yrs)     Types: Cigarettes     Start date:      Quit date: 2011     Years since quittin.0    Smokeless tobacco: Never    Tobacco comments:     1/2 ppd   Vaping Use    Vaping status: Never Used   Substance and Sexual Activity    Alcohol use: No     Alcohol/week: 0.0 standard drinks of alcohol     Comment: In recovery beginning     Drug use: No    Sexual activity: Never   Other Topics Concern    Parent/sibling w/ CABG, MI or angioplasty before 65F 55M? Yes   Social History Narrative                 Social Determinants of Health      Financial Resource Strain: Low Risk  (4/30/2024)    Financial Resource Strain     Within the past 12 months, have you or your family members you live with been unable to get utilities (heat, electricity) when it was really needed?: No   Food Insecurity: Low Risk  (4/30/2024)    Food Insecurity     Within the past 12 months, did you worry that your food would run out before you got money to buy more?: No     Within the past 12 months, did the food you bought just not last and you didn t have money to get more?: No   Transportation Needs: Low Risk  (4/30/2024)    Transportation Needs     Within the past 12 months, has lack of transportation kept you from medical appointments, getting your medicines, non-medical meetings or appointments, work, or from getting things that you need?: No   Physical Activity: Inactive (4/30/2024)    Exercise Vital Sign     Days of Exercise per Week: 0 days     Minutes of Exercise per Session: 0 min   Stress: Stress Concern Present (4/30/2024)    [place] [employer] - Occupational Stress Questionnaire     Feeling of Stress : To some extent   Social Connections: Unknown (4/30/2024)    Social Connection and Isolation Panel [NHANES]     Frequency of Communication with Friends and Family: Not on file     Frequency of Social Gatherings with Friends and Family: More than three times a week     Attends Spiritism Services: Not on file     Active Member of Clubs or Organizations: Not on file     Attends Club or Organization Meetings: Not on file     Marital Status: Not on file   Interpersonal Safety: Not on file   Housing Stability: Low Risk  (4/30/2024)    Housing Stability     Do you have housing? : Yes     Are you worried about losing your housing?: No       Family History   Problem Relation Age of Onset    C.A.D. Mother 63        MI- first at age 63    Heart Disease Mother     Hypertension Mother     Cerebrovascular Disease Mother     Hyperlipidemia Mother     Coronary  "Artery Disease Mother         MI-first at age 63    Other - See Comments Mother         cerebrovascular disease     Alcohol/Drug Father     Alzheimer Disease Father     Dementia Father     Hypertension Father     Hyperlipidemia Father     Substance Abuse Father     Diabetes Sister     Hypertension Sister     Hyperlipidemia Sister     Substance Abuse Sister     Asthma Sister     C.A.D. Sister 52        Minor MI- age 50's    Heart Disease Sister     Hypertension Sister     Hypertension Sister     Cancer - colorectal Sister 48        Late 40's early 50's    Gastrointestinal Disease Sister         Diverticulitis    Lipids Sister     Lipids Sister     Diabetes Sister     Heart Disease Sister         CHF    Cancer Sister         lung, smoker    Substance Abuse Sister     Asthma Sister     Cancer Sister     Coronary Artery Disease Sister         minor MI-age 50's    Heart Disease Sister     Hypertension Sister     Hypertension Sister     Colon Cancer Sister     Diverticulitis Sister     Lung Cancer Sister     Heart Disease Sister         CHF    Diabetes Sister     Asthma Sister     Hypertension Brother     Prostate Cancer Brother 74        Dx'd age 74    Gastrointestinal Disease Brother         Diverticulitis    Parkinsonism Brother     Substance Abuse Brother     Prostate Cancer Brother     Hyperlipidemia Brother     Hypertension Brother     Prostate Cancer Brother     Diverticulitis Brother     Parkinsonism Brother     Breast Cancer Daughter     Breast Cancer Daughter     Diabetes Other     Hypertension Other     Anesthesia Reaction No family hx of     Deep Vein Thrombosis (DVT) No family hx of              Vitals: BP (!) 152/75 (BP Location: Right arm, Patient Position: Sitting, Cuff Size: Adult Regular)   Pulse 55   Ht 1.651 m (5' 5\")   Wt 81.2 kg (179 lb)   LMP  (LMP Unknown)   SpO2 92%   BMI 29.79 kg/m      Exam:   GENERAL APPEARANCE: Well developed, well nourished, alert, and in no apparent distress.  RESP: " good air flow throughout.  No crackles. No rhonchi. No wheezes.  CV: Normal S1, S2, regular rhythm, normal rate. No murmur.  No LE edema.   MS: extremities normal. No clubbing. No cyanosis.  SKIN: no rash on limited exam.  NEURO: Mentation intact, speech normal.  PSYCH: mentation appears normal and affect normal/bright.      Results:  Recent Results (from the past 168 hour(s))   6 minute walk test    Collection Time: 08/16/24 12:00 AM   Result Value Ref Range    6 min walk (FT) 450 912 ft    6 Min Walk (M) 137 278 m   General PFT Lab (Please always keep checked)    Collection Time: 08/16/24 10:09 AM   Result Value Ref Range    FVC-Pred 2.35 L    FVC-Pre 2.02 L    FVC-%Pred-Pre 85 %    FEV1-Pre 1.53 L    FEV1-%Pred-Pre 85 %    FEV1FVC-Pred 77 %    FEV1FVC-Pre 76 %    FEFMax-Pred 4.60 L/sec    FEFMax-Pre 5.50 L/sec    FEFMax-%Pred-Pre 119 %    FEF2575-Pred 1.41 L/sec    FEF2575-Pre 1.15 L/sec    UPI6797-%Pred-Pre 81 %    ExpTime-Pre 5.14 sec    FIFMax-Pre 2.71 L/sec    VC-Pred 3.06 L    VC-Pre 2.06 L    VC-%Pred-Pre 67 %    IC-Pred 1.72 L    IC-Pre 1.57 L    IC-%Pred-Pre 91 %    ERV-Pred 0.86 L    ERV-Pre 0.49 L    ERV-%Pred-Pre 57 %    FEV1FEV6-Pred 77 %    FEV1FEV6-Pre 76 %    FRCPleth-Pred 2.81 L    FRCPleth-Pre 2.64 L    FRCPleth-%Pred-Pre 93 %    RVPleth-Pred 2.36 L    RVPleth-Pre 2.15 L    RVPleth-%Pred-Pre 90 %    TLCPleth-Pred 5.19 L    TLCPleth-Pre 4.21 L    TLCPleth-%Pred-Pre 81 %    DLCOunc-Pred 18.74 ml/min/mmHg    DLCOunc-Pre 16.00 ml/min/mmHg    DLCOunc-%Pred-Pre 85 %    VA-Pre 3.48 L    VA-%Pred-Pre 75 %    FEV1SVC-Pred 58 %    FEV1SVC-Pre 74 %       I reviewed pulmonary function test that was performed today.  This shows normal spirometry, lung volumes and diffusion.  Lung function is much improved compared to 3 months ago.  At that time, sister Betty was quite sedated from bupropion.    I also reviewed walk test from today.  Walk distance is reduced on room air without significant desaturation or  hypoxemia.    I reviewed results with the patient.        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  She had mild ILD on a previous chest CT scan in 2015, but follow-up chest CT scan in 2018 showed improvement with no obvious ILD present at that time, but the follicular bronchiolitis changes were still present.    1.  Follicular bronchiolitis associated with Sjogren's.  PFT is normal and back up to baseline compared to 3 months ago.  At that time, she was quite sedated on bupropion, so was unable to give maximal effort on the PFT.  She is tolerating her medications well, so we will continue azithromycin 250 mg daily, montelukast 10 mg daily and Breo Ellipta 100-25 mcg daily.  Her prednisone dose is usually 5 mg a day, but she is currently on a 6-day burst for gout.  I will see her back in 6 months with full PFT.  There is no need to change her medications at this time as she is doing well and her lung function is is now back up to normal and back up to prior baseline.  If her follicular bronchiolitis worsens in the future, then we could consider either increasing prednisone dose or more intensive immunosuppression such as rituximab.  However, I suspect that her follicular bronchiolitis will remain stable.  The longitudinal plan of care for the diagnosis(es)/condition(s) as documented were addressed during this visit. Due to the added complexity in care, I will continue to support Betty in the subsequent management and with ongoing continuity of care.  I spent 31 minutes reviewing chart, talking with and examining patient, reviewing test results, formulating plan and documentation on the day of the encounter (excluding PFT review).

## 2024-08-16 NOTE — LETTER
8/16/2024      Betty Tee  [address]  [place] [address]      Dear Colleague,    Thank you for referring your patient, Betty Tee, to the [facility] AND [facility] [place]. Please see a copy of my visit note below.    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  She had mild ILD on a previous chest CT scan in 2015, but follow-up chest CT scan in 2018 showed improvement with no obvious ILD present at that time, but the follicular bronchiolitis changes were still present.  She has been on prednisone for her Sjogren's for many years, and that is managed by Dr. Lopez in rheumatology. Her medications for follicular bronchiolitis include prednisone dose 5 mg daily, azithromycin 250 mg daily, montelukast daily, and Breo Ellipta inhaler 100-25. These are all anti-inflammatories for her follicular bronchiolitis.     Today, Sister Betty is in clinic with her friend.  She reports that her dyspnea on exertion feels about the same.  She does not walk stairs at the home, but she does feel a little short of breath when she walks longer distances.  She endorses occasional dry cough that is not bothersome.  She denies fevers.  She is not exercising.    She was recently diagnosed with gout in her left hand and has started treatment, and her hand feels better.  She continues to use CPAP at nighttime for sleep apnea.  She is no longer on Bupropion, which had really sedated her at her last visit.            Current Outpatient Medications   Medication Sig Dispense Refill     acyclovir (ZOVIRAX) 400 MG tablet Take 1 tablet (400 mg) by mouth every 8 hours For a couple days 15 tablet  2     acyclovir (ZOVIRAX) 5 % external ointment Apply topically as needed (6 times day PRN for outbreaks) As needed for outbreaks 15 g 3     albuterol (PROAIR HFA/PROVENTIL HFA/VENTOLIN HFA) 108 (90 Base) MCG/ACT inhaler Inhale 2 puffs into the lungs every 6 hours as needed for wheezing or shortness of breath       allopurinol (ZYLOPRIM) 100 MG tablet TAKE 1 TABLET BY MOUTH EVERY DAY 90 tablet 1     anakinra (KINERET) 100 MG/0.67ML SOSY injection Inject 0.67 mLs (100 mg) Subcutaneous daily 20.1 mL 3     aspirin (ASA) 81 MG EC tablet Take 1 tablet (81 mg) by mouth 2 times daily 60 tablet 0     atorvastatin (LIPITOR) 10 MG tablet Take 0.5 tablets (5 mg) by mouth daily 45 tablet 2     azithromycin (ZITHROMAX) 250 MG tablet TAKE 1 TABLET BY MOUTH EVERY DAY 30 tablet 11     BD PEN NEEDLE JANE 2ND GEN 32G X 4 MM miscellaneous USE TO TEST 4 TIMES A  each 1     blood glucose (ACCU-CHEK SHANNON PLUS) test strip USE TO TEST BLOOD SUGARS 3 TIMES DAILY 300 strip 1     blood glucose (NO BRAND SPECIFIED) lancets standard Use to test blood sugar 3 times daily or as directed 100 Lancet 3     BREO ELLIPTA 100-25 MCG/ACT inhaler TAKE 1 PUFF BY MOUTH EVERY DAY 1 each 11     buPROPion (WELLBUTRIN XL) 150 MG 24 hr tablet TAKE 1 TABLET BY MOUTH EVERY MORNING 90 tablet 0     cevimeline (EVOXAC) 30 MG capsule Take 1 capsule (30 mg) by mouth 3 times daily as needed 270 capsule 3     Cholecalciferol (VITAMIN D3) 50 MCG (2000 UT) CAPS TAKE 100 MCG BY MOUTH DAILY (TAKE 2 TABLET (50 MCG) BY MOUTH DAILY - ORAL) 180 capsule 2     COMPOUNDED NON-CONTROLLED SUBSTANCE (CMPD RX) - PHARMACY TO MIX COMPOUNDED MEDICATION Estriol 1 mg/g Apply small amount to finger and apply to inside vagina daily for 2 weeks then twice weekly Route: vaginally Dispense 30 grams 11 refills 30 g 11     cyanocobalamin (VITAMIN B-12) 1000 MCG tablet Take 1 tablet (1,000 mcg) by mouth three times a week       escitalopram (LEXAPRO) 20 MG tablet Take 1 tablet (20 mg) by  mouth daily 90 tablet 1     fluticasone (FLONASE) 50 MCG/ACT nasal spray SPRAY 1-2 SPRAYS INTO BOTH NOSTRILS DAILY AS NEEDED FOR ALLERGIES 16 mL 11     hydroxychloroquine (PLAQUENIL) 200 MG tablet TAKE 1 TABLET (200 MG) BY MOUTH DAILY GET ANNUAL EYE EXAMS FOR MONITORING AND FAX [phone] 90 tablet 0     insulin glargine (LANTUS SOLOSTAR) 100 UNIT/ML pen INJECT 15 UNITS SUBCUTANEOUS AT BEDTIME 30 mL 0     insulin lispro (HUMALOG KWIKPEN) 100 UNIT/ML (1 unit dial) KWIKPEN Inject Subcutaneous 3 times daily (before meals) Sliding scale insulin; tests BG three times day  If BG <150, no insulin given   If -200 take 2 units  If -250 take 4 units  If -300 take 6 units  If -350 take 10 units  If BG >350 take 14 units       ketoconazole (NIZORAL) 2 % external cream Apply topically 2 times daily as needed for itching 60 g 1     levocetirizine (XYZAL) 5 MG tablet TAKE 1 TABLET BY MOUTH EVERY DAY IN THE EVENING 90 tablet 1     lidocaine (LIDODERM) 5 % patch APPLY PATCH TO PAINFUL AREA FOR UP TO 12 H WITHIN A 24 H PERIOD. REMOVE AFTER 12 HOURS. (Patient taking differently: Apply patch to painful area for up to 12 h within a 24 h period.  Remove after 12 hours.) 30 patch 2     loratadine (CLARITIN) 10 MG tablet TAKE 1 TABLET BY MOUTH EVERY DAY 90 tablet 3     methocarbamol (ROBAXIN) 750 MG tablet Take 1 tablet (750 mg) by mouth 3 times daily as needed for muscle spasms 90 tablet 3     metoprolol succinate ER (TOPROL XL) 50 MG 24 hr tablet TAKE 1 TABLET BY MOUTH TWICE A  tablet 3     mirabegron (MYRBETRIQ) 25 MG 24 hr tablet Take 1 tablet (25 mg) by mouth daily 30 tablet 11     montelukast (SINGULAIR) 10 MG tablet TAKE 1 TABLET BY MOUTH EVERYDAY AT BEDTIME 90 tablet 3     pantoprazole (PROTONIX) 40 MG EC tablet Take 1 tablet (40 mg) by mouth daily (replaces famotidine- should stop taking famotidine) 90 tablet 3     polyethylene glycol (MIRALAX) 17 g packet Take 17 g by mouth daily 10 packet 0      potassium chloride ER (KLOR-CON) 20 MEQ CR tablet Take 2 tablets (40 mEq) by mouth every morning AND 1 tablet (20 mEq) every evening. 270 tablet 3     predniSONE (DELTASONE) 10 MG tablet For gout flares, take the prednisone as 40-30-20-10 mg a day, each course x 3 days then go back to 5 mg a day 30 tablet 3     predniSONE (DELTASONE) 5 MG tablet Take 1 tablet (5 mg) by mouth daily 90 tablet 1     Semaglutide, 2 MG/DOSE, (OZEMPIC) 8 MG/3ML pen Inject 2 mg Subcutaneous every 7 days 9 mL 3     torsemide (DEMADEX) 20 MG tablet TAKE 2 TABLETS (40 MG) BY MOUTH EVERY MORNING AND 1 TABLET (20 MG) DAILY AT 2 PM. TAKE THE AFTERNOON DOSE WITH AN EXTRA 10 MG TAB FOR A TOTAL OF 30 MG IN THE AFTERNOON 270 tablet 3     traZODone (DESYREL) 50 MG tablet Take 3 tablets (150 mg) by mouth at bedtime 90 tablet 9     Current Facility-Administered Medications   Medication Dose Route Frequency Provider Last Rate Last Admin     [START ON 12/10/2024] denosumab (PROLIA) injection 60 mg  60 mg Subcutaneous Q6 Months          Allergies   Allergen Reactions     Amoxicillin-Pot Clavulanate Nausea and Vomiting     Amoxicillin-Pot Clavulanate      Codeine Nausea and Vomiting     PN: LW Reaction: HIVES     Penicillins Nausea and Vomiting     PN: LW Reaction: GI Upset     Phenobarbital Itching     Seasonal Allergies      Past Medical History:   Diagnosis Date     Alcohol abuse, in remission      Allergic rhinitis, cause unspecified     allegra helps when she takes it     Antiplatelet or antithrombotic long-term use      Atrial fibrillation (H)     in hosp in 11/11 after surgery w/ fluid overload     Cardiomegaly     LVH on stress echo- cardiac w/u at [facility] ER- neg CT scan for PE, neg stress echo in 8/06     Chest pain, unspecified      Congestive heart failure (H)      Depressive disorder      Diabetes (H)      Disorder of bone and cartilage, unspecified     osteopenia (had been on prempro), improved on 6/06 dexa, stable dexa 11/10      Diverticulosis of colon (without mention of hemorrhage)     last episode yrs ago     Essential hypertension, benign      Follicular bronchiolitis (H)     associated with Sjogrens, dx by chest CT showing mosaic attenuation and air trapping     Gastro-oesophageal reflux disease      ILD (interstitial lung disease) (H)     associated with Sjogrens, also has mildly elevated IgG4, first noted on chest CT 2015 (mild changes) and also has small airways disease; ILD improved on follow up chest CT 2018.     Insomnia, unspecified     weaned off clonazepam     Irregular heart beat      Lumbago 07/2009    MRI with DJD, now seeing Dr. Cain for sciatic sx's     Major depressive disorder, recurrent episode, moderate (H)      Obstructive sleep apnea     uses cpap     Osteoarthrosis, unspecified whether generalized or localized, unspecified site      Sjogren's syndrome (H24)     + RG and SSA and lip bx     Sleep apnea     uses a cpap machine     Tobacco use disorder     chantix in 9/07, started again in 6/08, working       Past Surgical History:   Procedure Laterality Date     APPENDECTOMY       ARTHROPLASTY HIP Right 5/31/2023    Procedure: Right total hip arthroplasty;  Surgeon: Thomas Shannon MD;  Location:  OR     BACK SURGERY  1962     BACK SURGERY  1962     BIOPSY BREAST  09/27/2002    Biopsy Left Breast     BIOPSY BREAST Left 09/27/2002     BREAST SURGERY       CARDIAC SURGERY       CARDIAC SURGERY       CHOLECYSTECTOMY  1990's?     CHOLECYSTECTOMY       COLECTOMY LEFT  11/07/2011    Procedure:COLECTOMY LEFT; Laparoscopic mobilization of splenic flexture, sigmoid colectomy, coloprotoscopy, loop illeostomy; Surgeon:CK CASTANEDA; Location:[facility] OR     COLECTOMY LEFT  11/07/2011     COLONOSCOPY  1990,s     COLONOSCOPY N/A 5/3/2022    Procedure: COLONOSCOPY;  Surgeon: Guru Winsome Dias MD;  Location: [facility] GI     CV LEFT ATRIAL APPENDAGE CLOSURE N/A 9/26/2022    Procedure: Left Atrial Appendage Closure;   Surgeon: Uzair Crews MD;  Location: [facility]ARD[facility] LAB     ENT SURGERY       EYE SURGERY       FLEXIBLE SIGMOIDOSCOPY  2011     HYSTERECTOMY TOTAL ABDOMINAL, BILATERAL SALPINGO-OOPHORECTOMY, COMBINED  2011    Procedure:COMBINED HYSTERECTOMY TOTAL ABDOMINAL, BILATERAL SALPINGO-OOPHORECTOMY; total abdominal hysterectomy, bilateral salpingo-oophorectomy; Surgeon:ALETA MANUEL; Location:[facility] OR     HYSTERECTOMY TOTAL ABDOMINAL, BILATERAL SALPINGO-OOPHORECTOMY, COMBINED  2011     ILEOSTOMY  2012    takedown loop ileostomy      INSERT STENT URETER  2011    Procedure:INSERT STENT URETER; Placement of Bilateral Ureteral Stents ; Surgeon:PRANEETH BRYANT; Location:[facility] OR     SIGMOIDECTOMY  2012    Dr. Castaneda, Sigmoidectomy for diverticular abscess, iliostomy afterwards until repair     SIGMOIDOSCOPY FLEXIBLE  2011    Procedure:SIGMOIDOSCOPY FLEXIBLE; Flexible Sigmoidoscopy; Surgeon:CK CASTANEDA; Location:[facility] OR     TAKEDOWN ILEOSTOMY  2012    Procedure:TAKEDOWN ILEOSTOMY; Takedown Loop Ileostomy ; Surgeon:CK CASTANEDA; Location:[facility] OR     URETERAL STENT PLACEMENT  2011     ZZC APPENDECTOMY  1970's?     ZZC NONSPECIFIC PROCEDURE  2005    exploratory abd lap, adhesions, resolved RLQ pain, diverticulitis episodes       Social History     Socioeconomic History     Marital status: Single     Spouse name: Not on file     Number of children: 0     Years of education: Ed Spec De     Highest education level: Not on file   Occupational History     Occupation: Professor     Employer: [employer]     Comment: [employer]- Education   Tobacco Use     Smoking status: Former     Current packs/day: 0.00     Average packs/day: 0.5 packs/day for 41.6 years (20.8 ttl pk-yrs)     Types: Cigarettes     Start date:      Quit date: 2011     Years since quittin.0     Smokeless tobacco: Never     Tobacco comments:       ppd   Vaping Use     Vaping status: Never Used   Substance and Sexual Activity     Alcohol use: No     Alcohol/week: 0.0 standard drinks of alcohol     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     Drug use: No     Sexual activity: Never   Other Topics Concern     Parent/sibling w/ CABG, MI or angioplasty before 65F 55M? Yes   Social History Narrative                 Social Determinants of Health     Financial Resource Strain: Low Risk  (4/30/2024)    Financial Resource Strain      Within the past 12 months, have you or your family members you live with been unable to get utilities (heat, electricity) when it was really needed?: No   Food Insecurity: Low Risk  (4/30/2024)    Food Insecurity      Within the past 12 months, did you worry that your food would run out before you got money to buy more?: No      Within the past 12 months, did the food you bought just not last and you didn t have money to get more?: No   Transportation Needs: Low Risk  (4/30/2024)    Transportation Needs      Within the past 12 months, has lack of transportation kept you from medical appointments, getting your medicines, non-medical meetings or appointments, work, or from getting things that you need?: No   Physical Activity: Inactive (4/30/2024)    Exercise Vital Sign      Days of Exercise per Week: 0 days      Minutes of Exercise per Session: 0 min   Stress: Stress Concern Present (4/30/2024)    [employer] - Occupational Stress Questionnaire      Feeling of Stress : To some extent   Social Connections: Unknown (4/30/2024)    Social Connection and Isolation Panel [NHANES]      Frequency of Communication with Friends and Family: Not on file      Frequency of Social Gatherings with Friends and Family: More than three times a week      Attends Restoration Services: Not on file      Active Member of Clubs or Organizations: Not on file      Attends Club or Organization Meetings: Not on file      Marital Status: Not on  file   Interpersonal Safety: Not on file   Housing Stability: Low Risk  (4/30/2024)    Housing Stability      Do you have housing? : Yes      Are you worried about losing your housing?: No       Family History   Problem Relation Age of Onset     C.A.D. Mother 63        MI- first at age 63     Heart Disease Mother      Hypertension Mother      Cerebrovascular Disease Mother      Hyperlipidemia Mother      Coronary Artery Disease Mother         MI-first at age 63     Other - See Comments Mother         cerebrovascular disease      Alcohol/Drug Father      Alzheimer Disease Father      Dementia Father      Hypertension Father      Hyperlipidemia Father      Substance Abuse Father      Diabetes Sister      Hypertension Sister      Hyperlipidemia Sister      Substance Abuse Sister      Asthma Sister      C.A.D. Sister 52        Minor MI- age 50's     Heart Disease Sister      Hypertension Sister      Hypertension Sister      Cancer - colorectal Sister 48        Late 40's early 50's     Gastrointestinal Disease Sister         Diverticulitis     Lipids Sister      Lipids Sister      Diabetes Sister      Heart Disease Sister         CHF     Cancer Sister         lung, smoker     Substance Abuse Sister      Asthma Sister      Cancer Sister      Coronary Artery Disease Sister         minor MI-age 50's     Heart Disease Sister      Hypertension Sister      Hypertension Sister      Colon Cancer Sister      Diverticulitis Sister      Lung Cancer Sister      Heart Disease Sister         CHF     Diabetes Sister      Asthma Sister      Hypertension Brother      Prostate Cancer Brother 74        Dx'd age 74     Gastrointestinal Disease Brother         Diverticulitis     Parkinsonism Brother      Substance Abuse Brother      Prostate Cancer Brother      Hyperlipidemia Brother      Hypertension Brother      Prostate Cancer Brother      Diverticulitis Brother      Parkinsonism Brother      Breast Cancer Daughter      Breast Cancer  "Daughter      Diabetes Other      Hypertension Other      Anesthesia Reaction No family hx of      Deep Vein Thrombosis (DVT) No family hx of              Vitals: BP (!) 152/75 (BP Location: Right arm, Patient Position: Sitting, Cuff Size: Adult Regular)   Pulse 55   Ht 1.651 m (5' 5\")   Wt 81.2 kg (179 lb)   LMP  (LMP Unknown)   SpO2 92%   BMI 29.79 kg/m      Exam:   GENERAL APPEARANCE: Well developed, well nourished, alert, and in no apparent distress.  RESP: good air flow throughout.  No crackles. No rhonchi. No wheezes.  CV: Normal S1, S2, regular rhythm, normal rate. No murmur.  No LE edema.   MS: extremities normal. No clubbing. No cyanosis.  SKIN: no rash on limited exam.  NEURO: Mentation intact, speech normal.  PSYCH: mentation appears normal and affect normal/bright.      Results:  Recent Results (from the past 168 hour(s))   6 minute walk test    Collection Time: 08/16/24 12:00 AM   Result Value Ref Range    6 min walk (FT) 450 912 ft    6 Min Walk (M) 137 278 m   General PFT Lab (Please always keep checked)    Collection Time: 08/16/24 10:09 AM   Result Value Ref Range    FVC-Pred 2.35 L    FVC-Pre 2.02 L    FVC-%Pred-Pre 85 %    FEV1-Pre 1.53 L    FEV1-%Pred-Pre 85 %    FEV1FVC-Pred 77 %    FEV1FVC-Pre 76 %    FEFMax-Pred 4.60 L/sec    FEFMax-Pre 5.50 L/sec    FEFMax-%Pred-Pre 119 %    FEF2575-Pred 1.41 L/sec    FEF2575-Pre 1.15 L/sec    ILU4238-%Pred-Pre 81 %    ExpTime-Pre 5.14 sec    FIFMax-Pre 2.71 L/sec    VC-Pred 3.06 L    VC-Pre 2.06 L    VC-%Pred-Pre 67 %    IC-Pred 1.72 L    IC-Pre 1.57 L    IC-%Pred-Pre 91 %    ERV-Pred 0.86 L    ERV-Pre 0.49 L    ERV-%Pred-Pre 57 %    FEV1FEV6-Pred 77 %    FEV1FEV6-Pre 76 %    FRCPleth-Pred 2.81 L    FRCPleth-Pre 2.64 L    FRCPleth-%Pred-Pre 93 %    RVPleth-Pred 2.36 L    RVPleth-Pre 2.15 L    RVPleth-%Pred-Pre 90 %    TLCPleth-Pred 5.19 L    TLCPleth-Pre 4.21 L    TLCPleth-%Pred-Pre 81 %    DLCOunc-Pred 18.74 ml/min/mmHg    DLCOunc-Pre 16.00 " ml/min/mmHg    DLCOunc-%Pred-Pre 85 %    VA-Pre 3.48 L    VA-%Pred-Pre 75 %    FEV1SVC-Pred 58 %    FEV1SVC-Pre 74 %       I reviewed pulmonary function test that was performed today.  This shows normal spirometry, lung volumes and diffusion.  Lung function is much improved compared to 3 months ago.  At that time, sister Betty was quite sedated from bupropion.    I also reviewed walk test from today.  Walk distance is reduced on room air without significant desaturation or hypoxemia.    I reviewed results with the patient.        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  She had mild ILD on a previous chest CT scan in 2015, but follow-up chest CT scan in 2018 showed improvement with no obvious ILD present at that time, but the follicular bronchiolitis changes were still present.    1.  Follicular bronchiolitis associated with Sjogren's.  PFT is normal and back up to baseline compared to 3 months ago.  At that time, she was quite sedated on bupropion, so was unable to give maximal effort on the PFT.  She is tolerating her medications well, so we will continue azithromycin 250 mg daily, montelukast 10 mg daily and Breo Ellipta 100-25 mcg daily.  Her prednisone dose is usually 5 mg a day, but she is currently on a 6-day burst for gout.  I will see her back in 6 months with full PFT.  There is no need to change her medications at this time as she is doing well and her lung function is is now back up to normal and back up to prior baseline.  If her follicular bronchiolitis worsens in the future, then we could consider either increasing prednisone dose or more intensive immunosuppression such as rituximab.  However, I suspect that her follicular bronchiolitis will remain stable.  The longitudinal plan of care for the diagnosis(es)/condition(s) as documented were addressed during this visit. Due to the added complexity  in care, I will continue to support Betty in the subsequent management and with ongoing continuity of care.  I spent 31 minutes reviewing chart, talking with and examining patient, reviewing test results, formulating plan and documentation on the day of the encounter (excluding PFT review).                  Again, thank you for allowing me to participate in the care of your patient.        Sincerely,        Maryjane Tillman MD

## 2024-08-20 LAB
DLCOUNC-%PRED-PRE: 85 %
DLCOUNC-PRE: 16 ML/MIN/MMHG
DLCOUNC-PRED: 18.74 ML/MIN/MMHG
ERV-%PRED-PRE: 57 %
ERV-PRE: 0.49 L
ERV-PRED: 0.86 L
EXPTIME-PRE: 5.14 SEC
FEF2575-%PRED-PRE: 81 %
FEF2575-PRE: 1.15 L/SEC
FEF2575-PRED: 1.41 L/SEC
FEFMAX-%PRED-PRE: 119 %
FEFMAX-PRE: 5.5 L/SEC
FEFMAX-PRED: 4.6 L/SEC
FEV1-%PRED-PRE: 85 %
FEV1-PRE: 1.53 L
FEV1FEV6-PRE: 76 %
FEV1FEV6-PRED: 77 %
FEV1FVC-PRE: 76 %
FEV1FVC-PRED: 77 %
FEV1SVC-PRE: 74 %
FEV1SVC-PRED: 58 %
FIFMAX-PRE: 2.71 L/SEC
FRCPLETH-%PRED-PRE: 93 %
FRCPLETH-PRE: 2.64 L
FRCPLETH-PRED: 2.81 L
FVC-%PRED-PRE: 85 %
FVC-PRE: 2.02 L
FVC-PRED: 2.35 L
IC-%PRED-PRE: 91 %
IC-PRE: 1.57 L
IC-PRED: 1.72 L
RVPLETH-%PRED-PRE: 90 %
RVPLETH-PRE: 2.15 L
RVPLETH-PRED: 2.36 L
TLCPLETH-%PRED-PRE: 81 %
TLCPLETH-PRE: 4.21 L
TLCPLETH-PRED: 5.19 L
VA-%PRED-PRE: 75 %
VA-PRE: 3.48 L
VC-%PRED-PRE: 67 %
VC-PRE: 2.06 L
VC-PRED: 3.06 L

## 2024-08-23 ENCOUNTER — OFFICE VISIT (OUTPATIENT)
Dept: RHEUMATOLOGY | Facility: CLINIC | Age: 84
End: 2024-08-23
Attending: INTERNAL MEDICINE
Payer: COMMERCIAL

## 2024-08-23 VITALS
OXYGEN SATURATION: 97 % | TEMPERATURE: 97.7 F | WEIGHT: 177.2 LBS | SYSTOLIC BLOOD PRESSURE: 146 MMHG | BODY MASS INDEX: 29.49 KG/M2 | HEART RATE: 57 BPM | DIASTOLIC BLOOD PRESSURE: 81 MMHG

## 2024-08-23 DIAGNOSIS — M10.9 ACUTE GOUT OF LEFT FOOT, UNSPECIFIED CAUSE: Primary | ICD-10-CM

## 2024-08-23 DIAGNOSIS — M10.442 OTHER SECONDARY ACUTE GOUT OF LEFT HAND: ICD-10-CM

## 2024-08-23 PROCEDURE — G0463 HOSPITAL OUTPT CLINIC VISIT: HCPCS | Performed by: INTERNAL MEDICINE

## 2024-08-23 PROCEDURE — 99214 OFFICE O/P EST MOD 30 MIN: CPT | Performed by: INTERNAL MEDICINE

## 2024-08-23 PROCEDURE — G2211 COMPLEX E/M VISIT ADD ON: HCPCS | Performed by: INTERNAL MEDICINE

## 2024-08-23 RX ORDER — OXYCODONE AND ACETAMINOPHEN 5; 325 MG/1; MG/1
1 TABLET ORAL EVERY 8 HOURS PRN
Qty: 60 TABLET | Refills: 0 | Status: SHIPPED | OUTPATIENT
Start: 2024-08-23

## 2024-08-23 RX ORDER — PREDNISONE 20 MG/1
TABLET ORAL
Qty: 25 TABLET | Refills: 0 | Status: SHIPPED | OUTPATIENT
Start: 2024-08-23

## 2024-08-23 ASSESSMENT — PAIN SCALES - GENERAL: PAINLEVEL: MODERATE PAIN (4)

## 2024-08-23 NOTE — LETTER
2024       RE: Betty Tee  [address]  [place] [address]     Dear Colleague,    Thank you for referring your patient, Betty Tee, to the [facility] [place] at [facility]. Please see a copy of my visit note below.    Rheumatology Clinic In Person Urgent Visit Note  Zulma Lopez MD  DOS:2024  Date of last visit: 2024    Name: Betty Tee  MRN: [id number]  Age: 84 year old  : 1940  Reason for visit: Follow up visit for PMR, presumed gout, primary Sjogren's syndrome    # Sjogren's syndrome since  with borderline +RG, +SSA, and lip biopsy focus score of 1. Ongoing dry mouth on evoxac qod  # Follicular bronchiolitis, prior mild ILD   # Osteopenia on DEXA 2019 with T score=-2.1 with decline in bone density on fosamax  # Chronic prednisone use  # DM  # A fib  # Severe R hip OA  # PMR stable on prednisone 5 mg every day  #Presumed gout flare, recovered  #ESOB      Assessment and Plan:    New Dx of PMR. Severe R hip pain is due to severe OA based on 2020 hip MRI. She prefered to avoid hip arthroplasty in the past but it is quite bothersome and would like to see ortho. Her inflammation markers improved on prednisone, PMR responded to prednisone, now is 5 mg qd.     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 Completed pulmonary rehab in 2019 where she was able to exercise without oxygen. GFR stable.    On HCQ since 2019 with significant improvement of joint pain. No retinal toxicity on eye exam done 2021. Tolerates HCQ well.     Sister Betty recovered from flare of presumed gout (or pseudogout given previous nl SUA) over L foot. She responded to high dose prednisone (40-30-20-10 mg every day each for 3 days) in the past, now is on 5 mg every day.  Given her DM,  osteopenia, highly recommend to start using anakinra prn for gout flares, no flares and has not required it yet.     She preferred in the past not to start daily urate-lowering therapy and her uric acid has been ~6 so allopurinol deferred.     7/8/2022: Sister Betty's major complaint at last visit was ESOB which is improved after Tx of B12 deficiency, iron deficiency due to GIB. 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 Her major complaint today is severe R hip pain due to OA which eventually requires R hip replacement, but she needs to be medically clear for surgery. Discussed pain mx and advised follow up with ortho.      1/13/2023: Stable, no active gout today. B12/iron deficiency anemia has improved. On allopurinol 50 mg po every day.    7/21/2023: On increased allopurinol 100 mg every day since 1/2023, severe polyarticular gout flare last week requiring ER visit and prednisone 40-30-20-10 mg every day, each x 3 days then 5 mg a day, as anakinra daily inj was not enough to control it.    Recovered from gout flare, discussed ongoing m/o gout.    Plan:    Prednisone 5 mg a day    Norco as needed for pain      Cevimeline for dry mouth could be taken 3 times a day (she takes it every other day as does not like to take so many pills)    Stay on fosamax weekly    Yearly eye exam on HCQ    Plaquenil 1 tab a day    For gout flares: take anakinra daily shots x 5-7 days, if not enough, take the prednisone as 40-30-20-10 mg a day, each course x 3 days then go back to 5 mg a day    Return in 6 months (in person)    Labs today    Allopurinol 100 mg a day    Return in person in 6 months    11/3/2023:    Recent gout flare, affecting multiple joints, responded to anakinra well. Will continue anakinra prn. Will re-check SUA with next blood draw and adjust allopurinol as needed. Will refer to endo to address bone health, no improvement of fosamax.    Plan:    Refer to Yvonne Hunt endocrinologist for  osteoporosis management    Labs in 1/2024    Keep 1/2024 appointment with me      2/28/2024:    Doing so well, no recent gout flare to use anakinra or prednisone. Significant improvement of inflammation markers.    Plan:    Prednisone 5 mg a day    Norco as needed for pain      Cevimeline for dry mouth could be taken 3 times a day (she takes it every other day as does not like to take so many pills)    Stay on fosamax weekly, upcoming endocrine appointment to address bone health in 5/2024    Yearly eye exam on HCQ    Plaquenil 1 tab a day    Allopurinol 100 mg every day    Labs q6-12 months, reviewed/stable    For gout flares: take anakinra daily shots x 5-7 days, if not enough, take the prednisone as 40-30-20-10 mg a day, each course x 3 days then go back to 5 mg a day        Return in 6 months in person      Today 8/23/2024:    Urgent visit for L wrist erythema/pain/mild swelling after a fall in July, improved by taking prednisone 40 mg every day taper+anakinra x 3 doses (finished last wk) but not resolved, very painful, worried about participating in PT. Wishes not to get local steroid inj by ortho.    Based on presentation, seems like pseudogout (more likely)/gout flare of l wrist triggered by trauma.    Discussed plan of care. Recommend not to do PT till inflammation settles down.    Plan:    Percocet as needed    Prednisone 60-40-20-10 mg a day, each course x 3 days then go back on 5 mg a day    Go back on anakinra inj daily x 3 more days    Watch the blood sugar on prednisone    Keep September appointment          The longitudinal plan of care for the diagnosis(es)/condition(s) as documented were addressed during this visit. Due to the added complexity in care, I will continue to support Betty in the subsequent management and with ongoing continuity of care.      Zulma Lopez MD        HPI:   Betty Tee is a 81 year old WF with a history of primary Sjogren's syndrome, PMR, OA who presents for  follow-up. She was diagnosed with Sjogren's syndrome in 2015 with borderline +RG, +SSA, and lip biopsy focus score of 1. Associated ILD and joint pain are prednisone-responsive which support the diagnosis.     10/1/2021: No gout flares since last visit so has not needed anakinra. She continues to have R hip pain related to severe OA but does not want to pursue surgery. She recently started pool therapy and began taking CBD oil yesterday. Is hoping to pursue medical marijuana in the future as she does not want to be dependent on norco. Aside from hip pain has otherwise been doing well.        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 She is on prednisone 5 mg a day, she was given prednisone taper recently for possible L foot gout flare which helped. Did not flare again to need using anakinra shots. She has severe R hip pain. Saw ortho, had R hip MRI on 9/5/2020 which showed severe OA, R hip arthroplasty was recommended. She would prefer to delay R hip arthroplasty, had R hip inj on 2/24, NSAIDs are not allowed with CKD. Norco helps but she does not like to be dependent on it, takes it rarely. No L foot pain today. Her hip/leg pain/back pain is getting better, going to PT, doing exercises at home, last time elbow massage caused pain. Epidural shot helped. Has dry mouth. SOB is stable at baseline.  Last 3 wk has been hard, her doctor took her off gabapentin, sweat/chills and loss of appetite. Reason was being on other meds. Now off gabapentin. Had several gushing bowel explosion, could not make it to the bathroom. Random, unpredictable. No triggers. Has had this problem for years.     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    No gout flares, has not required anakinra, it is in the fridge.    Has breathing issue, ESOB is worse. O2 level is good. Saw PCP, was recomemnded to do follow up with cardiology.    Getting R hip steroid inj, last one was for bursitis. Still  hurts crissy today. Saw pain mx yesterday. Got minimal exercises to help moving.    7/8/2022:    Sister Betty presents for follow up. At last visit,w as found to be anemic which explained her SOB. She had iron deficiency and B12 deficiency. Was found to have GIB. Had multiple GI visits, EGD, was treated with cauterization x 3  for GAVE. This AM, had brown stool not black.    SOB is improved.    No gout flares since last visit.    Her major complaint is severe R hip pain, not responding to current pain mx, considers hip arthroplasty, but was told to wait till anemia gets fixed, also has to figure out her heart condition.      1/13/2023:     No gout flare today  Anakinra prn helps with flares  SOB is better  Hip OA pain is the same, considers arthroplasty.      7/21/2023:    Reviewed chart, labs, recent events.    -s/p R hip arthroplasty, recovered well  -Severe polyarticular gout flare last week requiring ER visit and prednisone 40-30-20-10 mg every day, each x 3 days then 5 mg a day, as anakinra qd inj was not enough to control it.  -better now    11/3/2023: Doing better than last visit, still flares with gout but not as bad, anakinra helps.    2/28/2024: Doing so well, no gout flares, no complaints.    Today 8/23/2024:    Flare, severe L wrist pain/swelling after fall in July. No fracture. Prednisone+anakinra helped with decreased swelling, pain is severe.    Review of Systems:   A comprehensive ROS was done. Positives are per HPI.      Active Medications:     Outpatient Medications Prior to Visit   Medication Sig Dispense Refill     acyclovir (ZOVIRAX) 400 MG tablet Take 1 tablet (400 mg) by mouth every 8 hours For a couple days 15 tablet 2     acyclovir (ZOVIRAX) 5 % external ointment Apply topically as needed (6 times day PRN for outbreaks) As needed for outbreaks 15 g 3     albuterol (PROAIR HFA/PROVENTIL HFA/VENTOLIN HFA) 108 (90 Base) MCG/ACT inhaler Inhale 2 puffs into the lungs every 6 hours as needed for  wheezing or shortness of breath       allopurinol (ZYLOPRIM) 100 MG tablet TAKE 1 TABLET BY MOUTH EVERY DAY 90 tablet 1     anakinra (KINERET) 100 MG/0.67ML SOSY injection Inject 0.67 mLs (100 mg) Subcutaneous daily 20.1 mL 3     aspirin (ASA) 81 MG EC tablet Take 1 tablet (81 mg) by mouth 2 times daily 60 tablet 0     atorvastatin (LIPITOR) 10 MG tablet Take 0.5 tablets (5 mg) by mouth daily 45 tablet 2     azithromycin (ZITHROMAX) 250 MG tablet TAKE 1 TABLET BY MOUTH EVERY DAY 30 tablet 11     BD PEN NEEDLE JANE 2ND GEN 32G X 4 MM miscellaneous USE TO TEST 4 TIMES A  each 1     blood glucose (ACCU-CHEK SHANNON PLUS) test strip USE TO TEST BLOOD SUGARS 3 TIMES DAILY 300 strip 1     blood glucose (NO BRAND SPECIFIED) lancets standard Use to test blood sugar 3 times daily or as directed 100 Lancet 3     BREO ELLIPTA 100-25 MCG/ACT inhaler TAKE 1 PUFF BY MOUTH EVERY DAY 1 each 11     buPROPion (WELLBUTRIN XL) 150 MG 24 hr tablet TAKE 1 TABLET BY MOUTH EVERY MORNING 90 tablet 0     cevimeline (EVOXAC) 30 MG capsule Take 1 capsule (30 mg) by mouth 3 times daily as needed 270 capsule 3     Cholecalciferol (VITAMIN D3) 50 MCG (2000 UT) CAPS TAKE 100 MCG BY MOUTH DAILY (TAKE 2 TABLET (50 MCG) BY MOUTH DAILY - ORAL) 180 capsule 2     COMPOUNDED NON-CONTROLLED SUBSTANCE (CMPD RX) - PHARMACY TO MIX COMPOUNDED MEDICATION Estriol 1 mg/g Apply small amount to finger and apply to inside vagina daily for 2 weeks then twice weekly Route: vaginally Dispense 30 grams 11 refills 30 g 11     cyanocobalamin (VITAMIN B-12) 1000 MCG tablet Take 1 tablet (1,000 mcg) by mouth three times a week       escitalopram (LEXAPRO) 20 MG tablet Take 1 tablet (20 mg) by mouth daily 90 tablet 1     fluticasone (FLONASE) 50 MCG/ACT nasal spray SPRAY 1-2 SPRAYS INTO BOTH NOSTRILS DAILY AS NEEDED FOR ALLERGIES 16 mL 11     hydroxychloroquine (PLAQUENIL) 200 MG tablet TAKE 1 TABLET (200 MG) BY MOUTH DAILY GET ANNUAL EYE EXAMS FOR MONITORING AND FAX  -[phone] 90 tablet 0     insulin glargine (LANTUS SOLOSTAR) 100 UNIT/ML pen INJECT 15 UNITS SUBCUTANEOUS AT BEDTIME 30 mL 0     insulin lispro (HUMALOG KWIKPEN) 100 UNIT/ML (1 unit dial) KWIKPEN Inject Subcutaneous 3 times daily (before meals) Sliding scale insulin; tests BG three times day  If BG <150, no insulin given   If -200 take 2 units  If -250 take 4 units  If -300 take 6 units  If -350 take 10 units  If BG >350 take 14 units       ketoconazole (NIZORAL) 2 % external cream Apply topically 2 times daily as needed for itching 60 g 1     levocetirizine (XYZAL) 5 MG tablet TAKE 1 TABLET BY MOUTH EVERY DAY IN THE EVENING 90 tablet 1     lidocaine (LIDODERM) 5 % patch APPLY PATCH TO PAINFUL AREA FOR UP TO 12 H WITHIN A 24 H PERIOD. REMOVE AFTER 12 HOURS. (Patient taking differently: Apply patch to painful area for up to 12 h within a 24 h period.  Remove after 12 hours.) 30 patch 2     loratadine (CLARITIN) 10 MG tablet TAKE 1 TABLET BY MOUTH EVERY DAY 90 tablet 3     methocarbamol (ROBAXIN) 750 MG tablet Take 1 tablet (750 mg) by mouth 3 times daily as needed for muscle spasms 90 tablet 3     metoprolol succinate ER (TOPROL XL) 50 MG 24 hr tablet TAKE 1 TABLET BY MOUTH TWICE A  tablet 3     mirabegron (MYRBETRIQ) 25 MG 24 hr tablet Take 1 tablet (25 mg) by mouth daily 30 tablet 11     montelukast (SINGULAIR) 10 MG tablet TAKE 1 TABLET BY MOUTH EVERYDAY AT BEDTIME 90 tablet 3     pantoprazole (PROTONIX) 40 MG EC tablet Take 1 tablet (40 mg) by mouth daily (replaces famotidine- should stop taking famotidine) 90 tablet 3     polyethylene glycol (MIRALAX) 17 g packet Take 17 g by mouth daily 10 packet 0     potassium chloride ER (KLOR-CON) 20 MEQ CR tablet Take 2 tablets (40 mEq) by mouth every morning AND 1 tablet (20 mEq) every evening. 270 tablet 3     predniSONE (DELTASONE) 10 MG tablet For gout flares, take the prednisone as 40-30-20-10 mg a day, each course x 3 days then go  back to 5 mg a day 30 tablet 3     predniSONE (DELTASONE) 5 MG tablet Take 1 tablet (5 mg) by mouth daily 90 tablet 1     Semaglutide, 2 MG/DOSE, (OZEMPIC) 8 MG/3ML pen Inject 2 mg Subcutaneous every 7 days 9 mL 3     torsemide (DEMADEX) 20 MG tablet TAKE 2 TABLETS (40 MG) BY MOUTH EVERY MORNING AND 1 TABLET (20 MG) DAILY AT 2 PM. TAKE THE AFTERNOON DOSE WITH AN EXTRA 10 MG TAB FOR A TOTAL OF 30 MG IN THE AFTERNOON 270 tablet 3     traZODone (DESYREL) 50 MG tablet Take 3 tablets (150 mg) by mouth at bedtime 90 tablet 9     Facility-Administered Medications Prior to Visit   Medication Dose Route Frequency Provider Last Rate Last Admin     [START ON 12/10/2024] denosumab (PROLIA) injection 60 mg  60 mg Subcutaneous Q6 Months          Allergies:   Allergies   Allergen Reactions     Amoxicillin-Pot Clavulanate Nausea and Vomiting     Amoxicillin-Pot Clavulanate      Codeine Nausea and Vomiting     PN: LW Reaction: HIVES     Penicillins Nausea and Vomiting     PN: LW Reaction: GI Upset     Phenobarbital Itching     Seasonal Allergies         Past Medical History:  Alcohol abuse, in remission.   Allergic rhinitis.   Antiplatelet long-term use.   Atrial fibrillation.   Cardiomegaly.   Osteopenia.   Diverticulosis.   Benign essential hypertension.   GERD.   Insomnia.   Irregular heart beat.   Lumbago.   Major depressive disorder, recurrent episode, moderate.   ANGELICA.  Osteoarthrosis.   Sjogren's syndrome.   Sleep apnea.   Tobacco use disorder.   TMJ disorder.   RLS.  Major depressive disorder.   Hypertension.   Sciatica.   Colouterine fistula.   Left ventricular hypertrophy.   Breat fibroadenoma.   DVT.   Fatty liver disease, nonalcoholic.   Rib pain.   Neck mass.   Interstitial lung disease.   Acute and chronic respiratory failure with hypoxia.   Type II diabetes mellitus.   Hyperlipidemia.   Inflammatory arthritis.      Past Surgical History:  Back surgery.   Left breast biopsy.   Appendectomy.   Exploratory laparotomy.    Cardiac surgery.   Cholecystectomy.   Left colectomy.   Total abdominal hysterectomy with bilateral salpingo-oophorectomy.   Insert ureter stent.   Flexible sigmoidoscopy.   Ileostomy takedown.     Family History:   Mother: Positive for CAD, heart disease, hypertension, cerebrovascular disease, hyperlipidemia.   Father: Positive for alcohol/drug abuse, Alzheimer disease, dementia, hypertension, hyperlipidemia.   Sister: Positive for CAD, hypertension, and colorectal cancer.   Sister: Positive for hypertension and hyperlipidemia.   Sister: Positive for diabetes, lung cancer, asthma, and heart disease.   Brother: Positive for Parkinsonism and substance abuse.   Brother: Positive for hypertension, diverticulitis, and prostate cancer.   Daughter: Positive for breast cancer.        Social History:   She is a former smoker; quit in 2011. She has a history of alcohol use but stopped drinking in 1986.      Physical Exam:     BP (!) 146/81   Pulse 57   Temp 97.7  F (36.5  C) (Oral)   Wt 80.4 kg (177 lb 3.2 oz)   LMP  (LMP Unknown)   SpO2 97%   BMI 29.49 kg/m      Constitutional: NAD, very pleasant, sister Nghia present   Eyes: Normal EOM, conjunctiva, sclera  Chest: CTAB  CV: no M/R/G, RRR  Abdomen: soft, NT  MSK: erythema/warmth/severe tenderness with minimal effusion of L wrist  Skin: No rash  Neuro: grossly non-focal  Psych: Normal affect.    Images:  CT Chest w/o contrast (7/25/18):  Findings remain most consistent with small airway disease  and/or nonclassifiable interstitial lung disease and are not  significantly changed from the March 2017 comparison.    Per radiology.    Laboratory:       Latest Ref Rng & Units 3/7/2024    11:16 AM 4/25/2024     4:35 PM 7/8/2024     1:29 PM   RHEUM RESULTS   ALT 0 - 50 U/L   12    AST 0 - 45 U/L   20    Creatinine 0.51 - 0.95 mg/dL 0.95  1.02  0.97    CRP Inflammation <5.00 mg/L   19.00    GFR Estimate >60 mL/min/1.73m2 59  54  57    Hematocrit 35.0 - 47.0 %   40.9     Hemoglobin 11.7 - 15.7 g/dL   12.9    WBC 4.0 - 11.0 10e3/uL   9.4    RBC Count 3.80 - 5.20 10e6/uL   4.41    RDW 10.0 - 15.0 %   13.9    MCHC 31.5 - 36.5 g/dL   31.5    MCV 78 - 100 fL   93    Platelet Count 150 - 450 10e3/uL   205        Rheumatoid Factor   Date Value Ref Range Status   07/24/2015 <20 <20 IU/mL Final   ,  ,   Cyclic Cit Pept IgG/IgA   Date Value Ref Range Status   07/24/2015 <20  Interpretation:  Negative   <20 UNITS Final   ,  ,   Scleroderma Antibody Scl-70 CECILIA IgG   Date Value Ref Range Status   07/24/2015  0.0 - 0.9 AI Final    <0.2  Negative   Antibody index (AI) values reflect qualitative changes in antibody   concentration that cannot be directly associated with clinical condition or   disease state.       SSA (Ro) (CECILIA) Antibody, IgG   Date Value Ref Range Status   07/24/2015 1.6 (H) 0.0 - 0.9 AI Final     Comment:     Positive   Antibody index (AI) values reflect qualitative changes in antibody   concentration that cannot be directly associated with clinical condition or   disease state.       SSB (La) (CECILIA) Antibody, IgG   Date Value Ref Range Status   07/24/2015  0.0 - 0.9 AI Final    <0.2  Negative   Antibody index (AI) values reflect qualitative changes in antibody   concentration that cannot be directly associated with clinical condition or   disease state.       ,  ,   RG Screen by EIA   Date Value Ref Range Status   07/24/2015 <1.0  Interpretation:  Negative   <1.0 Final   ,  ,  ,  ,  ,  ,  ,  ,  ,  ,  ,  ,  ,  ,  ,  ,  ,  ,   Neutrophil Cytoplasmic IgG Antibody   Date Value Ref Range Status   07/24/2015   Final    <1:20  Reference range: <1:20  (Note)  The ANCA IFA is <1:20; therefore, no further testing will  be performed.  INTERPRETIVE INFORMATION: Anti-Neutrophil Cyto Ab, IgG  Neutrophil Cytoplasmic Antibodies (C-ANCA = granular  cytoplasmic staining, P-ANCA = perinuclear staining) are  found in the serum of over 90 percent of patients with  certain necrotizing systemic  vasculitides, and usually in  less than 5 percent of patients with collagen vascular  disease or arthritis.  Performed by [facility],  [facility] [place] [address] [phone]  [email or web address], Burke Mckeon MD, Lab. Director       ,  ,  ,  ,  ,  ,  ,   IGG   Date Value Ref Range Status   07/24/2015 1320 695 - 1620 mg/dL Final   ,  ,  ,  ,  ,   Scleroderma Antibody Scl-70 CECILIA IgG   Date Value Ref Range Status   07/24/2015  0.0 - 0.9 AI Final    <0.2  Negative   Antibody index (AI) values reflect qualitative changes in antibody   concentration that cannot be directly associated with clinical condition or   disease state.          CBC RESULTS: 6/4/2021   Lab Test 06/04/21  1422   WBC 8.6   RBC 4.46   HGB 13.4   HCT 42.1   MCV 94   MCH 30.0   MCHC 31.8   RDW 15.5*        Last Comprehensive Metabolic Panel:  Sodium   Date Value Ref Range Status   04/25/2024 138 135 - 145 mmol/L Final     Comment:     Reference intervals for this test were updated on 09/26/2023 to more accurately reflect our healthy population. There may be differences in the flagging of prior results with similar values performed with this method. Interpretation of those prior results can be made in the context of the updated reference intervals.    06/04/2021 137 133 - 144 mmol/L Final     Potassium   Date Value Ref Range Status   04/25/2024 4.2 3.4 - 5.3 mmol/L Final   08/23/2022 4.1 3.4 - 5.3 mmol/L Final   06/04/2021 4.0 3.4 - 5.3 mmol/L Final     Chloride   Date Value Ref Range Status   04/25/2024 99 98 - 107 mmol/L Final   08/23/2022 107 94 - 109 mmol/L Final   06/04/2021 106 94 - 109 mmol/L Final     Carbon Dioxide   Date Value Ref Range Status   06/04/2021 25 20 - 32 mmol/L Final     Carbon Dioxide (CO2)   Date Value Ref Range Status   04/25/2024 27 22 - 29 mmol/L Final   08/23/2022 22 20 - 32 mmol/L Final     Anion Gap   Date Value Ref Range Status   04/25/2024 12 7 - 15 mmol/L Final   08/23/2022 8 3 - 14 mmol/L Final    06/04/2021 6 3 - 14 mmol/L Final     Glucose   Date Value Ref Range Status   04/25/2024 244 (H) 70 - 99 mg/dL Final   08/23/2022 198 (H) 70 - 99 mg/dL Final   06/04/2021 142 (H) 70 - 99 mg/dL Final     GLUCOSE BY METER POCT   Date Value Ref Range Status   06/05/2023 259 (H) 70 - 99 mg/dL Final     Urea Nitrogen   Date Value Ref Range Status   04/25/2024 17.2 8.0 - 23.0 mg/dL Final   08/23/2022 17 7 - 30 mg/dL Final   06/04/2021 14 7 - 30 mg/dL Final     Creatinine   Date Value Ref Range Status   07/08/2024 0.97 (H) 0.51 - 0.95 mg/dL Final   06/04/2021 1.11 (H) 0.52 - 1.04 mg/dL Final     GFR Estimate   Date Value Ref Range Status   07/08/2024 57 (L) >60 mL/min/1.73m2 Final     Comment:     eGFR calculated using 2021 CKD-EPI equation.   06/04/2021 47 (L) >60 mL/min/[1.73_m2] Final     Comment:     Non  GFR Calc  Starting 12/18/2018, serum creatinine based estimated GFR (eGFR) will be   calculated using the Chronic Kidney Disease Epidemiology Collaboration   (CKD-EPI) equation.       Calcium   Date Value Ref Range Status   04/25/2024 9.7 8.8 - 10.2 mg/dL Final   06/04/2021 8.8 8.5 - 10.1 mg/dL Final       ESR 6/4/2021: 10.0    CRP 6/4/2021: 3.0    Uric acid 6/4/2021: 6.2    Component      Latest Ref Rng & Units 2/11/2022   WBC      4.0 - 11.0 10e3/uL 11.5 (H)   RBC Count      3.80 - 5.20 10e6/uL 3.47 (L)   Hemoglobin      11.7 - 15.7 g/dL 9.1 (L)   Hematocrit      35.0 - 47.0 % 30.2 (L)   MCV      78 - 100 fL 87   MCH      26.5 - 33.0 pg 26.2 (L)   MCHC      31.5 - 36.5 g/dL 30.1 (L)   RDW      10.0 - 15.0 % 15.1 (H)   Platelet Count      150 - 450 10e3/uL 283   % Neutrophils      % 76   % Lymphocytes      % 9   % Monocytes      % 11   % Eosinophils      % 2   % Basophils      % 1   % Immature Granulocytes      % 1   NRBCs per 100 WBC      <1 /100 0   Absolute Neutrophils      1.6 - 8.3 10e3/uL 8.9 (H)   Absolute Lymphocytes      0.8 - 5.3 10e3/uL 1.0   Absolute Monocytes      0.0 - 1.3 10e3/uL 1.2    Absolute Eosinophils      0.0 - 0.7 10e3/uL 0.2   Absolute Basophils      0.0 - 0.2 10e3/uL 0.1   Absolute Immature Granulocytes      <=0.4 10e3/uL 0.1   Absolute NRBCs      10e3/uL 0.0   Albumin Fraction      3.7 - 5.1 g/dL 3.9   Alpha 1 Fraction      0.2 - 0.4 g/dL 0.4   Alpha 2 Fraction      0.5 - 0.9 g/dL 0.8   Beta Fraction      0.6 - 1.0 g/dL 1.0   Gamma Fraction      0.7 - 1.6 g/dL 0.8   Monoclonal Peak      <=0.0 g/dL 0.0   ELP Interpretation:       Essentially normal electrophoretic pattern. No obvious monoclonal proteins seen. Pathologic significance requires clinical correlation. Meghan Brothers M.D., Ph.D.   Iron      35 - 180 ug/dL 17 (L)   Iron Binding Cap      240 - 430 ug/dL 354   Iron Saturation Index      15 - 46 % 5 (L)   Creatinine      0.52 - 1.04 mg/dL 1.00   GFR Estimate      >60 mL/min/1.73m2 56 (L)   % Retic      0.5 - 2.0 % 2.7 (H)   Absolute Retic      0.025 - 0.095 10e6/uL 0.090   ALT      0 - 50 U/L 17   Albumin      3.4 - 5.0 g/dL 3.3 (L)   AST      0 - 45 U/L 15   Sed Rate      0 - 30 mm/hr 36 (H)   CRP Inflammation      0.0 - 8.0 mg/L 12.5 (H)   Uric Acid      2.6 - 6.0 mg/dL 6.1 (H)   Immunofixation ELP       No monoclonal protein seen on immunofixation. Pathologic significance requires clinical correlation.  Meghan Brothers M.D., Ph.D.   Immunofix ELP Urine       No monoclonal protein seen on immunofixation. Pathologic significance requires clinical correlation.  Meghan Brothers M.D., Ph.D.   Total Protein Serum for ELP      6.8 - 8.8 g/dL 6.9   N-Terminal Pro Bnp      0 - 450 pg/mL 239   Haptoglobin      32 - 197 mg/dL 149   Vitamin B12      193 - 986 pg/mL 162 (L)   Ferritin      8 - 252 ng/mL 14       Again, thank you for allowing me to participate in the care of your patient.      Sincerely,    Zulma Lopez MD

## 2024-08-23 NOTE — PATIENT INSTRUCTIONS
Percocet as needed    Prednisone 60-40-20-10 mg a day, each course x 3 days then go back on 5 mg a day    Go back on anakinra inj daily x 3 more days    Watch the blood sugar on prednisone    Keep September appointment

## 2024-08-23 NOTE — PROGRESS NOTES
Rheumatology Clinic In Person Urgent Visit Note  Zulma Lopez MD  DOS:2024  Date of last visit: 2024    Name: Betty Tee  MRN: [id number]  Age: 84 year old  : 1940  Reason for visit: Follow up visit for PMR, presumed gout, primary Sjogren's syndrome    # Sjogren's syndrome since  with borderline +RG, +SSA, and lip biopsy focus score of 1. Ongoing dry mouth on evoxac qod  # Follicular bronchiolitis, prior mild ILD   # Osteopenia on DEXA 2019 with T score=-2.1 with decline in bone density on fosamax  # Chronic prednisone use  # DM  # A fib  # Severe R hip OA  # PMR stable on prednisone 5 mg every day  #Presumed gout flare, recovered  #ESOB      Assessment and Plan:    New Dx of PMR. Severe R hip pain is due to severe OA based on 2020 hip MRI. She prefered to avoid hip arthroplasty in the past but it is quite bothersome and would like to see ortho. Her inflammation markers improved on prednisone, PMR responded to prednisone, now is 5 mg qd.     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 Completed pulmonary rehab in 2019 where she was able to exercise without oxygen. GFR stable.    On HCQ since 2019 with significant improvement of joint pain. No retinal toxicity on eye exam done 2021. Tolerates HCQ well.     Sister Betty recovered from flare of presumed gout (or pseudogout given previous nl SUA) over L foot. She responded to high dose prednisone (40-30-20-10 mg every day each for 3 days) in the past, now is on 5 mg every day.  Given her DM, osteopenia, highly recommend to start using anakinra prn for gout flares, no flares and has not required it yet.     She preferred in the past not to start daily urate-lowering therapy and her uric acid has been ~6 so allopurinol deferred.     2022: Sister Betty's major complaint at last visit was ESOB which is improved  after Tx of B12 deficiency, iron deficiency due to GIB. 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 Her major complaint today is severe R hip pain due to OA which eventually requires R hip replacement, but she needs to be medically clear for surgery. Discussed pain mx and advised follow up with ortho.      1/13/2023: Stable, no active gout today. B12/iron deficiency anemia has improved. On allopurinol 50 mg po every day.    7/21/2023: On increased allopurinol 100 mg every day since 1/2023, severe polyarticular gout flare last week requiring ER visit and prednisone 40-30-20-10 mg every day, each x 3 days then 5 mg a day, as anakinra daily inj was not enough to control it.    Recovered from gout flare, discussed ongoing m/o gout.    Plan:    Prednisone 5 mg a day    Norco as needed for pain      Cevimeline for dry mouth could be taken 3 times a day (she takes it every other day as does not like to take so many pills)    Stay on fosamax weekly    Yearly eye exam on HCQ    Plaquenil 1 tab a day    For gout flares: take anakinra daily shots x 5-7 days, if not enough, take the prednisone as 40-30-20-10 mg a day, each course x 3 days then go back to 5 mg a day    Return in 6 months (in person)    Labs today    Allopurinol 100 mg a day    Return in person in 6 months    11/3/2023:    Recent gout flare, affecting multiple joints, responded to anakinra well. Will continue anakinra prn. Will re-check SUA with next blood draw and adjust allopurinol as needed. Will refer to endo to address bone health, no improvement of fosamax.    Plan:    Refer to Yvonne Hunt endocrinologist for osteoporosis management    Labs in 1/2024    Keep 1/2024 appointment with me      2/28/2024:    Doing so well, no recent gout flare to use anakinra or prednisone. Significant improvement of inflammation markers.    Plan:    Prednisone 5 mg a day    Norco as needed for pain      Cevimeline for dry mouth could be taken 3 times a day  (she takes it every other day as does not like to take so many pills)    Stay on fosamax weekly, upcoming endocrine appointment to address bone health in 5/2024    Yearly eye exam on HCQ    Plaquenil 1 tab a day    Allopurinol 100 mg every day    Labs q6-12 months, reviewed/stable    For gout flares: take anakinra daily shots x 5-7 days, if not enough, take the prednisone as 40-30-20-10 mg a day, each course x 3 days then go back to 5 mg a day        Return in 6 months in person      Today 8/23/2024:    Urgent visit for L wrist erythema/pain/mild swelling after a fall in July, improved by taking prednisone 40 mg every day taper+anakinra x 3 doses (finished last wk) but not resolved, very painful, worried about participating in PT. Wishes not to get local steroid inj by ortho.    Based on presentation, seems like pseudogout (more likely)/gout flare of l wrist triggered by trauma.    Discussed plan of care. Recommend not to do PT till inflammation settles down.    Plan:    Percocet as needed    Prednisone 60-40-20-10 mg a day, each course x 3 days then go back on 5 mg a day    Go back on anakinra inj daily x 3 more days    Watch the blood sugar on prednisone    Keep September appointment          The longitudinal plan of care for the diagnosis(es)/condition(s) as documented were addressed during this visit. Due to the added complexity in care, I will continue to support Betty in the subsequent management and with ongoing continuity of care.      Zulma Lopez MD        HPI:   Betty T Randal is a 81 year old WF with a history of primary Sjogren's syndrome, PMR, OA who presents for follow-up. She was diagnosed with Sjogren's syndrome in 2015 with borderline +RG, +SSA, and lip biopsy focus score of 1. Associated ILD and joint pain are prednisone-responsive which support the diagnosis.     10/1/2021: No gout flares since last visit so has not needed anakinra. She continues to have R hip pain related to severe OA  but does not want to pursue surgery. She recently started pool therapy and began taking CBD oil yesterday. Is hoping to pursue medical marijuana in the future as she does not want to be dependent on norco. Aside from hip pain has otherwise been doing well.        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 She is on prednisone 5 mg a day, she was given prednisone taper recently for possible L foot gout flare which helped. Did not flare again to need using anakinra shots. She has severe R hip pain. Saw ortho, had R hip MRI on 9/5/2020 which showed severe OA, R hip arthroplasty was recommended. She would prefer to delay R hip arthroplasty, had R hip inj on 2/24, NSAIDs are not allowed with CKD. Norco helps but she does not like to be dependent on it, takes it rarely. No L foot pain today. Her hip/leg pain/back pain is getting better, going to PT, doing exercises at home, last time elbow massage caused pain. Epidural shot helped. Has dry mouth. SOB is stable at baseline.  Last 3 wk has been hard, her doctor took her off gabapentin, sweat/chills and loss of appetite. Reason was being on other meds. Now off gabapentin. Had several gushing bowel explosion, could not make it to the bathroom. Random, unpredictable. No triggers. Has had this problem for years.     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    No gout flares, has not required anakinra, it is in the fridge.    Has breathing issue, ESOB is worse. O2 level is good. Saw PCP, was recomemnded to do follow up with cardiology.    Getting R hip steroid inj, last one was for bursitis. Still hurts crissy today. Saw pain mx yesterday. Got minimal exercises to help moving.    7/8/2022:    Sister Betty presents for follow up. At last visit,w as found to be anemic which explained her SOB. She had iron deficiency and B12 deficiency. Was found to have GIB. Had multiple GI visits, EGD, was treated with cauterization x 3  for GAVE.  This AM, had brown stool not black.    SOB is improved.    No gout flares since last visit.    Her major complaint is severe R hip pain, not responding to current pain mx, considers hip arthroplasty, but was told to wait till anemia gets fixed, also has to figure out her heart condition.      1/13/2023:     No gout flare today  Anakinra prn helps with flares  SOB is better  Hip OA pain is the same, considers arthroplasty.      7/21/2023:    Reviewed chart, labs, recent events.    -s/p R hip arthroplasty, recovered well  -Severe polyarticular gout flare last week requiring ER visit and prednisone 40-30-20-10 mg every day, each x 3 days then 5 mg a day, as anakinra qd inj was not enough to control it.  -better now    11/3/2023: Doing better than last visit, still flares with gout but not as bad, anakinra helps.    2/28/2024: Doing so well, no gout flares, no complaints.    Today 8/23/2024:    Flare, severe L wrist pain/swelling after fall in July. No fracture. Prednisone+anakinra helped with decreased swelling, pain is severe.    Review of Systems:   A comprehensive ROS was done. Positives are per HPI.      Active Medications:     Outpatient Medications Prior to Visit   Medication Sig Dispense Refill    acyclovir (ZOVIRAX) 400 MG tablet Take 1 tablet (400 mg) by mouth every 8 hours For a couple days 15 tablet 2    acyclovir (ZOVIRAX) 5 % external ointment Apply topically as needed (6 times day PRN for outbreaks) As needed for outbreaks 15 g 3    albuterol (PROAIR HFA/PROVENTIL HFA/VENTOLIN HFA) 108 (90 Base) MCG/ACT inhaler Inhale 2 puffs into the lungs every 6 hours as needed for wheezing or shortness of breath      allopurinol (ZYLOPRIM) 100 MG tablet TAKE 1 TABLET BY MOUTH EVERY DAY 90 tablet 1    anakinra (KINERET) 100 MG/0.67ML SOSY injection Inject 0.67 mLs (100 mg) Subcutaneous daily 20.1 mL 3    aspirin (ASA) 81 MG EC tablet Take 1 tablet (81 mg) by mouth 2 times daily 60 tablet 0    atorvastatin (LIPITOR) 10  MG tablet Take 0.5 tablets (5 mg) by mouth daily 45 tablet 2    azithromycin (ZITHROMAX) 250 MG tablet TAKE 1 TABLET BY MOUTH EVERY DAY 30 tablet 11    BD PEN NEEDLE JANE 2ND GEN 32G X 4 MM miscellaneous USE TO TEST 4 TIMES A  each 1    blood glucose (ACCU-CHEK SHANNON PLUS) test strip USE TO TEST BLOOD SUGARS 3 TIMES DAILY 300 strip 1    blood glucose (NO BRAND SPECIFIED) lancets standard Use to test blood sugar 3 times daily or as directed 100 Lancet 3    BREO ELLIPTA 100-25 MCG/ACT inhaler TAKE 1 PUFF BY MOUTH EVERY DAY 1 each 11    buPROPion (WELLBUTRIN XL) 150 MG 24 hr tablet TAKE 1 TABLET BY MOUTH EVERY MORNING 90 tablet 0    cevimeline (EVOXAC) 30 MG capsule Take 1 capsule (30 mg) by mouth 3 times daily as needed 270 capsule 3    Cholecalciferol (VITAMIN D3) 50 MCG (2000 UT) CAPS TAKE 100 MCG BY MOUTH DAILY (TAKE 2 TABLET (50 MCG) BY MOUTH DAILY - ORAL) 180 capsule 2    COMPOUNDED NON-CONTROLLED SUBSTANCE (CMPD RX) - PHARMACY TO MIX COMPOUNDED MEDICATION Estriol 1 mg/g Apply small amount to finger and apply to inside vagina daily for 2 weeks then twice weekly Route: vaginally Dispense 30 grams 11 refills 30 g 11    cyanocobalamin (VITAMIN B-12) 1000 MCG tablet Take 1 tablet (1,000 mcg) by mouth three times a week      escitalopram (LEXAPRO) 20 MG tablet Take 1 tablet (20 mg) by mouth daily 90 tablet 1    fluticasone (FLONASE) 50 MCG/ACT nasal spray SPRAY 1-2 SPRAYS INTO BOTH NOSTRILS DAILY AS NEEDED FOR ALLERGIES 16 mL 11    hydroxychloroquine (PLAQUENIL) 200 MG tablet TAKE 1 TABLET (200 MG) BY MOUTH DAILY GET ANNUAL EYE EXAMS FOR MONITORING AND FAX [phone] 90 tablet 0    insulin glargine (LANTUS SOLOSTAR) 100 UNIT/ML pen INJECT 15 UNITS SUBCUTANEOUS AT BEDTIME 30 mL 0    insulin lispro (HUMALOG KWIKPEN) 100 UNIT/ML (1 unit dial) KWIKPEN Inject Subcutaneous 3 times daily (before meals) Sliding scale insulin; tests BG three times day  If BG <150, no insulin given   If -200 take 2 units  If  -250 take 4 units  If -300 take 6 units  If -350 take 10 units  If BG >350 take 14 units      ketoconazole (NIZORAL) 2 % external cream Apply topically 2 times daily as needed for itching 60 g 1    levocetirizine (XYZAL) 5 MG tablet TAKE 1 TABLET BY MOUTH EVERY DAY IN THE EVENING 90 tablet 1    lidocaine (LIDODERM) 5 % patch APPLY PATCH TO PAINFUL AREA FOR UP TO 12 H WITHIN A 24 H PERIOD. REMOVE AFTER 12 HOURS. (Patient taking differently: Apply patch to painful area for up to 12 h within a 24 h period.  Remove after 12 hours.) 30 patch 2    loratadine (CLARITIN) 10 MG tablet TAKE 1 TABLET BY MOUTH EVERY DAY 90 tablet 3    methocarbamol (ROBAXIN) 750 MG tablet Take 1 tablet (750 mg) by mouth 3 times daily as needed for muscle spasms 90 tablet 3    metoprolol succinate ER (TOPROL XL) 50 MG 24 hr tablet TAKE 1 TABLET BY MOUTH TWICE A  tablet 3    mirabegron (MYRBETRIQ) 25 MG 24 hr tablet Take 1 tablet (25 mg) by mouth daily 30 tablet 11    montelukast (SINGULAIR) 10 MG tablet TAKE 1 TABLET BY MOUTH EVERYDAY AT BEDTIME 90 tablet 3    pantoprazole (PROTONIX) 40 MG EC tablet Take 1 tablet (40 mg) by mouth daily (replaces famotidine- should stop taking famotidine) 90 tablet 3    polyethylene glycol (MIRALAX) 17 g packet Take 17 g by mouth daily 10 packet 0    potassium chloride ER (KLOR-CON) 20 MEQ CR tablet Take 2 tablets (40 mEq) by mouth every morning AND 1 tablet (20 mEq) every evening. 270 tablet 3    predniSONE (DELTASONE) 10 MG tablet For gout flares, take the prednisone as 40-30-20-10 mg a day, each course x 3 days then go back to 5 mg a day 30 tablet 3    predniSONE (DELTASONE) 5 MG tablet Take 1 tablet (5 mg) by mouth daily 90 tablet 1    Semaglutide, 2 MG/DOSE, (OZEMPIC) 8 MG/3ML pen Inject 2 mg Subcutaneous every 7 days 9 mL 3    torsemide (DEMADEX) 20 MG tablet TAKE 2 TABLETS (40 MG) BY MOUTH EVERY MORNING AND 1 TABLET (20 MG) DAILY AT 2 PM. TAKE THE AFTERNOON DOSE WITH AN EXTRA 10 MG  TAB FOR A TOTAL OF 30 MG IN THE AFTERNOON 270 tablet 3    traZODone (DESYREL) 50 MG tablet Take 3 tablets (150 mg) by mouth at bedtime 90 tablet 9     Facility-Administered Medications Prior to Visit   Medication Dose Route Frequency Provider Last Rate Last Admin    [START ON 12/10/2024] denosumab (PROLIA) injection 60 mg  60 mg Subcutaneous Q6 Months          Allergies:   Allergies   Allergen Reactions    Amoxicillin-Pot Clavulanate Nausea and Vomiting    Amoxicillin-Pot Clavulanate     Codeine Nausea and Vomiting     PN: LW Reaction: HIVES    Penicillins Nausea and Vomiting     PN: LW Reaction: GI Upset    Phenobarbital Itching    Seasonal Allergies         Past Medical History:  Alcohol abuse, in remission.   Allergic rhinitis.   Antiplatelet long-term use.   Atrial fibrillation.   Cardiomegaly.   Osteopenia.   Diverticulosis.   Benign essential hypertension.   GERD.   Insomnia.   Irregular heart beat.   Lumbago.   Major depressive disorder, recurrent episode, moderate.   ANGELICA.  Osteoarthrosis.   Sjogren's syndrome.   Sleep apnea.   Tobacco use disorder.   TMJ disorder.   RLS.  Major depressive disorder.   Hypertension.   Sciatica.   Colouterine fistula.   Left ventricular hypertrophy.   Breat fibroadenoma.   DVT.   Fatty liver disease, nonalcoholic.   Rib pain.   Neck mass.   Interstitial lung disease.   Acute and chronic respiratory failure with hypoxia.   Type II diabetes mellitus.   Hyperlipidemia.   Inflammatory arthritis.      Past Surgical History:  Back surgery.   Left breast biopsy.   Appendectomy.   Exploratory laparotomy.   Cardiac surgery.   Cholecystectomy.   Left colectomy.   Total abdominal hysterectomy with bilateral salpingo-oophorectomy.   Insert ureter stent.   Flexible sigmoidoscopy.   Ileostomy takedown.     Family History:   Mother: Positive for CAD, heart disease, hypertension, cerebrovascular disease, hyperlipidemia.   Father: Positive for alcohol/drug abuse, Alzheimer disease, dementia,  hypertension, hyperlipidemia.   Sister: Positive for CAD, hypertension, and colorectal cancer.   Sister: Positive for hypertension and hyperlipidemia.   Sister: Positive for diabetes, lung cancer, asthma, and heart disease.   Brother: Positive for Parkinsonism and substance abuse.   Brother: Positive for hypertension, diverticulitis, and prostate cancer.   Daughter: Positive for breast cancer.        Social History:   She is a former smoker; quit in 2011. She has a history of alcohol use but stopped drinking in 1986.      Physical Exam:     BP (!) 146/81   Pulse 57   Temp 97.7  F (36.5  C) (Oral)   Wt 80.4 kg (177 lb 3.2 oz)   LMP  (LMP Unknown)   SpO2 97%   BMI 29.49 kg/m      Constitutional: NAD, very pleasant, sister Nghia present   Eyes: Normal EOM, conjunctiva, sclera  Chest: CTAB  CV: no M/R/G, RRR  Abdomen: soft, NT  MSK: erythema/warmth/severe tenderness with minimal effusion of L wrist  Skin: No rash  Neuro: grossly non-focal  Psych: Normal affect.    Images:  CT Chest w/o contrast (7/25/18):  Findings remain most consistent with small airway disease  and/or nonclassifiable interstitial lung disease and are not  significantly changed from the March 2017 comparison.    Per radiology.    Laboratory:       Latest Ref Rng & Units 3/7/2024    11:16 AM 4/25/2024     4:35 PM 7/8/2024     1:29 PM   RHEUM RESULTS   ALT 0 - 50 U/L   12    AST 0 - 45 U/L   20    Creatinine 0.51 - 0.95 mg/dL 0.95  1.02  0.97    CRP Inflammation <5.00 mg/L   19.00    GFR Estimate >60 mL/min/1.73m2 59  54  57    Hematocrit 35.0 - 47.0 %   40.9    Hemoglobin 11.7 - 15.7 g/dL   12.9    WBC 4.0 - 11.0 10e3/uL   9.4    RBC Count 3.80 - 5.20 10e6/uL   4.41    RDW 10.0 - 15.0 %   13.9    MCHC 31.5 - 36.5 g/dL   31.5    MCV 78 - 100 fL   93    Platelet Count 150 - 450 10e3/uL   205        Rheumatoid Factor   Date Value Ref Range Status   07/24/2015 <20 <20 IU/mL Final   ,  ,   Cyclic Cit Pept IgG/IgA   Date Value Ref Range Status    07/24/2015 <20  Interpretation:  Negative   <20 UNITS Final   ,  ,   Scleroderma Antibody Scl-70 CECILIA IgG   Date Value Ref Range Status   07/24/2015  0.0 - 0.9 AI Final    <0.2  Negative   Antibody index (AI) values reflect qualitative changes in antibody   concentration that cannot be directly associated with clinical condition or   disease state.       SSA (Ro) (CECILIA) Antibody, IgG   Date Value Ref Range Status   07/24/2015 1.6 (H) 0.0 - 0.9 AI Final     Comment:     Positive   Antibody index (AI) values reflect qualitative changes in antibody   concentration that cannot be directly associated with clinical condition or   disease state.       SSB (La) (CECILIA) Antibody, IgG   Date Value Ref Range Status   07/24/2015  0.0 - 0.9 AI Final    <0.2  Negative   Antibody index (AI) values reflect qualitative changes in antibody   concentration that cannot be directly associated with clinical condition or   disease state.       ,  ,   RG Screen by EIA   Date Value Ref Range Status   07/24/2015 <1.0  Interpretation:  Negative   <1.0 Final   ,  ,  ,  ,  ,  ,  ,  ,  ,  ,  ,  ,  ,  ,  ,  ,  ,  ,   Neutrophil Cytoplasmic IgG Antibody   Date Value Ref Range Status   07/24/2015   Final    <1:20  Reference range: <1:20  (Note)  The ANCA IFA is <1:20; therefore, no further testing will  be performed.  INTERPRETIVE INFORMATION: Anti-Neutrophil Cyto Ab, IgG  Neutrophil Cytoplasmic Antibodies (C-ANCA = granular  cytoplasmic staining, P-ANCA = perinuclear staining) are  found in the serum of over 90 percent of patients with  certain necrotizing systemic vasculitides, and usually in  less than 5 percent of patients with collagen vascular  disease or arthritis.  Performed by [facility],  [address] [place] [id number] [phone]  [email or web address], Burke Mckeon MD, Lab. Director       ,  ,  ,  ,  ,  ,  ,   IGG   Date Value Ref Range Status   07/24/2015 1320 695 - 1620 mg/dL Final   ,  ,  ,  ,  ,   Scleroderma Antibody Scl-70  CECILIA IgG   Date Value Ref Range Status   07/24/2015  0.0 - 0.9 AI Final    <0.2  Negative   Antibody index (AI) values reflect qualitative changes in antibody   concentration that cannot be directly associated with clinical condition or   disease state.          CBC RESULTS: 6/4/2021   Lab Test 06/04/21  1422   WBC 8.6   RBC 4.46   HGB 13.4   HCT 42.1   MCV 94   MCH 30.0   MCHC 31.8   RDW 15.5*        Last Comprehensive Metabolic Panel:  Sodium   Date Value Ref Range Status   04/25/2024 138 135 - 145 mmol/L Final     Comment:     Reference intervals for this test were updated on 09/26/2023 to more accurately reflect our healthy population. There may be differences in the flagging of prior results with similar values performed with this method. Interpretation of those prior results can be made in the context of the updated reference intervals.    06/04/2021 137 133 - 144 mmol/L Final     Potassium   Date Value Ref Range Status   04/25/2024 4.2 3.4 - 5.3 mmol/L Final   08/23/2022 4.1 3.4 - 5.3 mmol/L Final   06/04/2021 4.0 3.4 - 5.3 mmol/L Final     Chloride   Date Value Ref Range Status   04/25/2024 99 98 - 107 mmol/L Final   08/23/2022 107 94 - 109 mmol/L Final   06/04/2021 106 94 - 109 mmol/L Final     Carbon Dioxide   Date Value Ref Range Status   06/04/2021 25 20 - 32 mmol/L Final     Carbon Dioxide (CO2)   Date Value Ref Range Status   04/25/2024 27 22 - 29 mmol/L Final   08/23/2022 22 20 - 32 mmol/L Final     Anion Gap   Date Value Ref Range Status   04/25/2024 12 7 - 15 mmol/L Final   08/23/2022 8 3 - 14 mmol/L Final   06/04/2021 6 3 - 14 mmol/L Final     Glucose   Date Value Ref Range Status   04/25/2024 244 (H) 70 - 99 mg/dL Final   08/23/2022 198 (H) 70 - 99 mg/dL Final   06/04/2021 142 (H) 70 - 99 mg/dL Final     GLUCOSE BY METER POCT   Date Value Ref Range Status   06/05/2023 259 (H) 70 - 99 mg/dL Final     Urea Nitrogen   Date Value Ref Range Status   04/25/2024 17.2 8.0 - 23.0 mg/dL Final    08/23/2022 17 7 - 30 mg/dL Final   06/04/2021 14 7 - 30 mg/dL Final     Creatinine   Date Value Ref Range Status   07/08/2024 0.97 (H) 0.51 - 0.95 mg/dL Final   06/04/2021 1.11 (H) 0.52 - 1.04 mg/dL Final     GFR Estimate   Date Value Ref Range Status   07/08/2024 57 (L) >60 mL/min/1.73m2 Final     Comment:     eGFR calculated using 2021 CKD-EPI equation.   06/04/2021 47 (L) >60 mL/min/[1.73_m2] Final     Comment:     Non  GFR Calc  Starting 12/18/2018, serum creatinine based estimated GFR (eGFR) will be   calculated using the Chronic Kidney Disease Epidemiology Collaboration   (CKD-EPI) equation.       Calcium   Date Value Ref Range Status   04/25/2024 9.7 8.8 - 10.2 mg/dL Final   06/04/2021 8.8 8.5 - 10.1 mg/dL Final       ESR 6/4/2021: 10.0    CRP 6/4/2021: 3.0    Uric acid 6/4/2021: 6.2    Component      Latest Ref Rng & Units 2/11/2022   WBC      4.0 - 11.0 10e3/uL 11.5 (H)   RBC Count      3.80 - 5.20 10e6/uL 3.47 (L)   Hemoglobin      11.7 - 15.7 g/dL 9.1 (L)   Hematocrit      35.0 - 47.0 % 30.2 (L)   MCV      78 - 100 fL 87   MCH      26.5 - 33.0 pg 26.2 (L)   MCHC      31.5 - 36.5 g/dL 30.1 (L)   RDW      10.0 - 15.0 % 15.1 (H)   Platelet Count      150 - 450 10e3/uL 283   % Neutrophils      % 76   % Lymphocytes      % 9   % Monocytes      % 11   % Eosinophils      % 2   % Basophils      % 1   % Immature Granulocytes      % 1   NRBCs per 100 WBC      <1 /100 0   Absolute Neutrophils      1.6 - 8.3 10e3/uL 8.9 (H)   Absolute Lymphocytes      0.8 - 5.3 10e3/uL 1.0   Absolute Monocytes      0.0 - 1.3 10e3/uL 1.2   Absolute Eosinophils      0.0 - 0.7 10e3/uL 0.2   Absolute Basophils      0.0 - 0.2 10e3/uL 0.1   Absolute Immature Granulocytes      <=0.4 10e3/uL 0.1   Absolute NRBCs      10e3/uL 0.0   Albumin Fraction      3.7 - 5.1 g/dL 3.9   Alpha 1 Fraction      0.2 - 0.4 g/dL 0.4   Alpha 2 Fraction      0.5 - 0.9 g/dL 0.8   Beta Fraction      0.6 - 1.0 g/dL 1.0   Gamma Fraction      0.7 -  1.6 g/dL 0.8   Monoclonal Peak      <=0.0 g/dL 0.0   ELP Interpretation:       Essentially normal electrophoretic pattern. No obvious monoclonal proteins seen. Pathologic significance requires clinical correlation. Meghan Brothers M.D., Ph.D.   Iron      35 - 180 ug/dL 17 (L)   Iron Binding Cap      240 - 430 ug/dL 354   Iron Saturation Index      15 - 46 % 5 (L)   Creatinine      0.52 - 1.04 mg/dL 1.00   GFR Estimate      >60 mL/min/1.73m2 56 (L)   % Retic      0.5 - 2.0 % 2.7 (H)   Absolute Retic      0.025 - 0.095 10e6/uL 0.090   ALT      0 - 50 U/L 17   Albumin      3.4 - 5.0 g/dL 3.3 (L)   AST      0 - 45 U/L 15   Sed Rate      0 - 30 mm/hr 36 (H)   CRP Inflammation      0.0 - 8.0 mg/L 12.5 (H)   Uric Acid      2.6 - 6.0 mg/dL 6.1 (H)   Immunofixation ELP       No monoclonal protein seen on immunofixation. Pathologic significance requires clinical correlation.  Meghan Brothers M.D., Ph.D.   Immunofix ELP Urine       No monoclonal protein seen on immunofixation. Pathologic significance requires clinical correlation.  Meghan Brothers M.D., Ph.D.   Total Protein Serum for ELP      6.8 - 8.8 g/dL 6.9   N-Terminal Pro Bnp      0 - 450 pg/mL 239   Haptoglobin      32 - 197 mg/dL 149   Vitamin B12      193 - 986 pg/mL 162 (L)   Ferritin      8 - 252 ng/mL 14

## 2024-08-27 DIAGNOSIS — Z96.641 STATUS POST TOTAL REPLACEMENT OF RIGHT HIP: Primary | ICD-10-CM

## 2024-08-29 ENCOUNTER — ANCILLARY PROCEDURE (OUTPATIENT)
Dept: GENERAL RADIOLOGY | Facility: CLINIC | Age: 84
End: 2024-08-29
Attending: ORTHOPAEDIC SURGERY
Payer: COMMERCIAL

## 2024-08-29 ENCOUNTER — OFFICE VISIT (OUTPATIENT)
Dept: ORTHOPEDICS | Facility: CLINIC | Age: 84
End: 2024-08-29
Payer: COMMERCIAL

## 2024-08-29 DIAGNOSIS — Z96.641 STATUS POST TOTAL REPLACEMENT OF RIGHT HIP: ICD-10-CM

## 2024-08-29 DIAGNOSIS — Z96.641 STATUS POST TOTAL REPLACEMENT OF RIGHT HIP: Primary | ICD-10-CM

## 2024-08-29 PROCEDURE — 99213 OFFICE O/P EST LOW 20 MIN: CPT | Performed by: ORTHOPAEDIC SURGERY

## 2024-08-29 PROCEDURE — 73502 X-RAY EXAM HIP UNI 2-3 VIEWS: CPT | Mod: RT | Performed by: RADIOLOGY

## 2024-08-29 ASSESSMENT — HOOS JR
RISING FROM SITTING: MILD
HOOS JR TOTAL INTERVAL SCORE: 92.34

## 2024-08-29 NOTE — LETTER
8/29/2024      Betty Tee  [address]  [place] [address]      Dear Colleague,    Thank you for referring your patient, Betty Tee, to the [facility] [place]. Please see a copy of my visit note below.    Chief Complaint: RECHECK (Right SRI 5/31/23 - One year follow up)         HPI: Betty Tee returns today in follow-up for her right hip.  She is 1 year out.  She is here with Sister Nghia and Sister Yvette.  She reports that she has no hip pain.  She is back to all of her desired activities.  She recently had gout in her hand which was very painful.  This is resolving.    She reports that she has been walking with a walker.  This is not because of her hip but because of feeling a little dizzy.  This has been diagnosed as a response to some of her medications.  She has no trouble walking without it in the home.    Current Status:  HOOS Jr: 92.34  [facility]:  Global Mental Health Score - (P) 14  Global Physical Health Score - (P) 12  PROMIS TOTAL - SUBSCORES - (P) 26  Medications and allergies are documented in the EMR and have been reviewed.      Current Outpatient Medications:      acyclovir (ZOVIRAX) 400 MG tablet, Take 1 tablet (400 mg) by mouth every 8 hours For a couple days, Disp: 15 tablet, Rfl: 2     acyclovir (ZOVIRAX) 5 % external ointment, Apply topically as needed (6 times day PRN for outbreaks) As needed for outbreaks, Disp: 15 g, Rfl: 3     albuterol (PROAIR HFA/PROVENTIL HFA/VENTOLIN HFA) 108 (90 Base) MCG/ACT inhaler, Inhale 2 puffs into the lungs every 6 hours as needed for wheezing or shortness of breath, Disp: , Rfl:      allopurinol (ZYLOPRIM) 100 MG tablet, TAKE 1 TABLET BY MOUTH EVERY DAY, Disp: 90 tablet, Rfl: 1     anakinra (KINERET) 100 MG/0.67ML SOSY injection, Inject 0.67 mLs (100 mg) Subcutaneous daily, Disp: 20.1 mL, Rfl: 3     aspirin (ASA) 81 MG EC tablet, Take 1 tablet (81 mg) by mouth 2 times daily, Disp: 60 tablet, Rfl: 0      atorvastatin (LIPITOR) 10 MG tablet, Take 0.5 tablets (5 mg) by mouth daily, Disp: 45 tablet, Rfl: 2     azithromycin (ZITHROMAX) 250 MG tablet, TAKE 1 TABLET BY MOUTH EVERY DAY, Disp: 30 tablet, Rfl: 11     BD PEN NEEDLE JANE 2ND GEN 32G X 4 MM miscellaneous, USE TO TEST 4 TIMES A DAY, Disp: 400 each, Rfl: 1     blood glucose (ACCU-CHEK SHANNNO PLUS) test strip, USE TO TEST BLOOD SUGARS 3 TIMES DAILY, Disp: 300 strip, Rfl: 1     blood glucose (NO BRAND SPECIFIED) lancets standard, Use to test blood sugar 3 times daily or as directed, Disp: 100 Lancet, Rfl: 3     BREO ELLIPTA 100-25 MCG/ACT inhaler, TAKE 1 PUFF BY MOUTH EVERY DAY, Disp: 1 each, Rfl: 11     buPROPion (WELLBUTRIN XL) 150 MG 24 hr tablet, TAKE 1 TABLET BY MOUTH EVERY MORNING, Disp: 90 tablet, Rfl: 0     cevimeline (EVOXAC) 30 MG capsule, Take 1 capsule (30 mg) by mouth 3 times daily as needed, Disp: 270 capsule, Rfl: 3     Cholecalciferol (VITAMIN D3) 50 MCG (2000 UT) CAPS, TAKE 100 MCG BY MOUTH DAILY (TAKE 2 TABLET (50 MCG) BY MOUTH DAILY - ORAL), Disp: 180 capsule, Rfl: 2     COMPOUNDED NON-CONTROLLED SUBSTANCE (CMPD RX) - PHARMACY TO MIX COMPOUNDED MEDICATION, Estriol 1 mg/g Apply small amount to finger and apply to inside vagina daily for 2 weeks then twice weekly Route: vaginally Dispense 30 grams 11 refills, Disp: 30 g, Rfl: 11     cyanocobalamin (VITAMIN B-12) 1000 MCG tablet, Take 1 tablet (1,000 mcg) by mouth three times a week, Disp: , Rfl:      escitalopram (LEXAPRO) 20 MG tablet, Take 1 tablet (20 mg) by mouth daily, Disp: 90 tablet, Rfl: 1     fluticasone (FLONASE) 50 MCG/ACT nasal spray, SPRAY 1-2 SPRAYS INTO BOTH NOSTRILS DAILY AS NEEDED FOR ALLERGIES, Disp: 16 mL, Rfl: 11     hydroxychloroquine (PLAQUENIL) 200 MG tablet, TAKE 1 TABLET (200 MG) BY MOUTH DAILY GET ANNUAL EYE EXAMS FOR MONITORING AND FAX -[phone], Disp: 90 tablet, Rfl: 0     insulin glargine (LANTUS SOLOSTAR) 100 UNIT/ML pen, INJECT 15 UNITS SUBCUTANEOUS AT BEDTIME,  Disp: 30 mL, Rfl: 0     insulin lispro (HUMALOG KWIKPEN) 100 UNIT/ML (1 unit dial) KWIKPEN, Inject Subcutaneous 3 times daily (before meals) Sliding scale insulin; tests BG three times day If BG <150, no insulin given  If -200 take 2 units If -250 take 4 units If -300 take 6 units If -350 take 10 units If BG >350 take 14 units, Disp: , Rfl:      ketoconazole (NIZORAL) 2 % external cream, Apply topically 2 times daily as needed for itching, Disp: 60 g, Rfl: 1     levocetirizine (XYZAL) 5 MG tablet, TAKE 1 TABLET BY MOUTH EVERY DAY IN THE EVENING, Disp: 90 tablet, Rfl: 1     lidocaine (LIDODERM) 5 % patch, APPLY PATCH TO PAINFUL AREA FOR UP TO 12 H WITHIN A 24 H PERIOD. REMOVE AFTER 12 HOURS. (Patient taking differently: Apply patch to painful area for up to 12 h within a 24 h period.  Remove after 12 hours.), Disp: 30 patch, Rfl: 2     loratadine (CLARITIN) 10 MG tablet, TAKE 1 TABLET BY MOUTH EVERY DAY, Disp: 90 tablet, Rfl: 3     methocarbamol (ROBAXIN) 750 MG tablet, Take 1 tablet (750 mg) by mouth 3 times daily as needed for muscle spasms, Disp: 90 tablet, Rfl: 3     metoprolol succinate ER (TOPROL XL) 50 MG 24 hr tablet, TAKE 1 TABLET BY MOUTH TWICE A DAY, Disp: 180 tablet, Rfl: 3     mirabegron (MYRBETRIQ) 25 MG 24 hr tablet, Take 1 tablet (25 mg) by mouth daily, Disp: 30 tablet, Rfl: 11     montelukast (SINGULAIR) 10 MG tablet, TAKE 1 TABLET BY MOUTH EVERYDAY AT BEDTIME, Disp: 90 tablet, Rfl: 3     oxyCODONE-acetaminophen (PERCOCET) 5-325 MG tablet, Take 1 tablet by mouth every 8 hours as needed for pain., Disp: 60 tablet, Rfl: 0     pantoprazole (PROTONIX) 40 MG EC tablet, Take 1 tablet (40 mg) by mouth daily (replaces famotidine- should stop taking famotidine), Disp: 90 tablet, Rfl: 3     polyethylene glycol (MIRALAX) 17 g packet, Take 17 g by mouth daily, Disp: 10 packet, Rfl: 0     potassium chloride ER (KLOR-CON) 20 MEQ CR tablet, Take 2 tablets (40 mEq) by mouth every morning AND  1 tablet (20 mEq) every evening., Disp: 270 tablet, Rfl: 3     predniSONE (DELTASONE) 10 MG tablet, For gout flares, take the prednisone as 40-30-20-10 mg a day, each course x 3 days then go back to 5 mg a day, Disp: 30 tablet, Rfl: 3     predniSONE (DELTASONE) 20 MG tablet, 60-40-20-10 mg a day, each course x 3 days then go back on 5 mg a day, Disp: 25 tablet, Rfl: 0     predniSONE (DELTASONE) 5 MG tablet, Take 1 tablet (5 mg) by mouth daily, Disp: 90 tablet, Rfl: 1     Semaglutide, 2 MG/DOSE, (OZEMPIC) 8 MG/3ML pen, Inject 2 mg Subcutaneous every 7 days, Disp: 9 mL, Rfl: 3     torsemide (DEMADEX) 20 MG tablet, TAKE 2 TABLETS (40 MG) BY MOUTH EVERY MORNING AND 1 TABLET (20 MG) DAILY AT 2 PM. TAKE THE AFTERNOON DOSE WITH AN EXTRA 10 MG TAB FOR A TOTAL OF 30 MG IN THE AFTERNOON, Disp: 270 tablet, Rfl: 3     traZODone (DESYREL) 50 MG tablet, Take 3 tablets (150 mg) by mouth at bedtime, Disp: 90 tablet, Rfl: 9    Current Facility-Administered Medications:      [START ON 12/10/2024] denosumab (PROLIA) injection 60 mg, 60 mg, Subcutaneous, Q6 Months,     Allergies: Amoxicillin-pot clavulanate, Amoxicillin-pot clavulanate, Codeine, Penicillins, Phenobarbital, and Seasonal allergies        Physical Exam:  On physical examination the patient appears the stated age, is in no acute distress, affect is appropriate, and breathing is non-labored.    LMP  (LMP Unknown)   There is no height or weight on file to calculate BMI.    Rises from chair: Easily  Gait: Normal  ROM: Fluid and painless with greater than 90 degrees of flexion and no symptoms with internal and external rotation and flexion  Sciatic motor function is normal and symmetric    We reviewed the radiographs together. These show the normal progression for a total hip arthroplasty without evidence of loosening or subsidence.       Assessment: 1 year status post right total hip arthroplasty doing very well.  We discussed activities on total hip.  We discussed follow-up.   All the patient's questions were answered to the best of my ability.    Plan: Follow-up in 5 years for radiographs.  Sooner if issues.  No ref. provider found      Subjective  WHAT:  Right  Hip  WHEN: 05/31/2023  HOW:  Status post right total hip arthroplasty    Betty is one year status post right total hip arthroplasty.  Overall the patient notes they are doing well.  The patient reports minimal right hip pain an.  Betty is without concern for RLE swelling and currently ambulates with a walker for medication associated dizziness.  The patient reports she has been getting around well without right hip complications but is interested in be more active. Betty is has a Hx of DVT but denies calf pain.  No complications reported since previous exam.     HOOS: 92.34    Objective  Musculoskeletal:  Right Hip  Skin: skin intact, no rashes or lesions, surgical incision is benign and healed  Effusion:none  Swelling:none  Edema:None  Atrophy:absent  Deformity:none  Tenderness: Without TTP    Range of motion:with exception to posterior hip precautions ROM is within normal limits   Stability: normal    Neurovascular:  Light touch sensation intact all dermatomes tested  Intact motor strength, 5/5 at right tib ant, EHL, EDL, gastroc, FHL, FDL peroneals and posterior tib.   PT pulses intact    Imaging   No new images reviewed today.   3 view x-rays of the  right hip completed today at [facility] are reviewed and demonstrate:    The right total hip arthroplasty is stable without fractures or progressive lucencies.      Assessment  Status post right total hip arthroplasty completed on 05/31/2023, doing well at one year post-op    Plan  Betty is doing well overall postoperatively.  A thorough discussion was had today with the patient regarding their progress and continued precautions. Posterior hip precautions reviewed.  The patient will continue with activities as tolerated. Betty will follow-up as needed for  re-assessment and repeat x-rays.  All other questions were answered today.       Again, thank you for allowing me to participate in the care of your patient.        Sincerely,        Thomas Shannon MD

## 2024-08-29 NOTE — PROGRESS NOTES
Chief Complaint: RECHECK (Right SRI 5/31/23 - One year follow up)         HPI: Betty Tee returns today in follow-up for her right hip.  She is 1 year out.  She is here with Sister Nghia and Sister Yvette.  She reports that she has no hip pain.  She is back to all of her desired activities.  She recently had gout in her hand which was very painful.  This is resolving.    She reports that she has been walking with a walker.  This is not because of her hip but because of feeling a little dizzy.  This has been diagnosed as a response to some of her medications.  She has no trouble walking without it in the home.    Current Status:  HOOS Jr: 92.34  [facility]:  Global Mental Health Score - (P) 14  Global Physical Health Score - (P) 12  PROMIS TOTAL - SUBSCORES - (P) 26  Medications and allergies are documented in the EMR and have been reviewed.      Current Outpatient Medications:     acyclovir (ZOVIRAX) 400 MG tablet, Take 1 tablet (400 mg) by mouth every 8 hours For a couple days, Disp: 15 tablet, Rfl: 2    acyclovir (ZOVIRAX) 5 % external ointment, Apply topically as needed (6 times day PRN for outbreaks) As needed for outbreaks, Disp: 15 g, Rfl: 3    albuterol (PROAIR HFA/PROVENTIL HFA/VENTOLIN HFA) 108 (90 Base) MCG/ACT inhaler, Inhale 2 puffs into the lungs every 6 hours as needed for wheezing or shortness of breath, Disp: , Rfl:     allopurinol (ZYLOPRIM) 100 MG tablet, TAKE 1 TABLET BY MOUTH EVERY DAY, Disp: 90 tablet, Rfl: 1    anakinra (KINERET) 100 MG/0.67ML SOSY injection, Inject 0.67 mLs (100 mg) Subcutaneous daily, Disp: 20.1 mL, Rfl: 3    aspirin (ASA) 81 MG EC tablet, Take 1 tablet (81 mg) by mouth 2 times daily, Disp: 60 tablet, Rfl: 0    atorvastatin (LIPITOR) 10 MG tablet, Take 0.5 tablets (5 mg) by mouth daily, Disp: 45 tablet, Rfl: 2    azithromycin (ZITHROMAX) 250 MG tablet, TAKE 1 TABLET BY MOUTH EVERY DAY, Disp: 30 tablet, Rfl: 11    BD PEN NEEDLE JANE 2ND GEN 32G X 4 MM miscellaneous, USE TO TEST  4 TIMES A DAY, Disp: 400 each, Rfl: 1    blood glucose (ACCU-CHEK SHANNON PLUS) test strip, USE TO TEST BLOOD SUGARS 3 TIMES DAILY, Disp: 300 strip, Rfl: 1    blood glucose (NO BRAND SPECIFIED) lancets standard, Use to test blood sugar 3 times daily or as directed, Disp: 100 Lancet, Rfl: 3    BREO ELLIPTA 100-25 MCG/ACT inhaler, TAKE 1 PUFF BY MOUTH EVERY DAY, Disp: 1 each, Rfl: 11    buPROPion (WELLBUTRIN XL) 150 MG 24 hr tablet, TAKE 1 TABLET BY MOUTH EVERY MORNING, Disp: 90 tablet, Rfl: 0    cevimeline (EVOXAC) 30 MG capsule, Take 1 capsule (30 mg) by mouth 3 times daily as needed, Disp: 270 capsule, Rfl: 3    Cholecalciferol (VITAMIN D3) 50 MCG (2000 UT) CAPS, TAKE 100 MCG BY MOUTH DAILY (TAKE 2 TABLET (50 MCG) BY MOUTH DAILY - ORAL), Disp: 180 capsule, Rfl: 2    COMPOUNDED NON-CONTROLLED SUBSTANCE (CMPD RX) - PHARMACY TO MIX COMPOUNDED MEDICATION, Estriol 1 mg/g Apply small amount to finger and apply to inside vagina daily for 2 weeks then twice weekly Route: vaginally Dispense 30 grams 11 refills, Disp: 30 g, Rfl: 11    cyanocobalamin (VITAMIN B-12) 1000 MCG tablet, Take 1 tablet (1,000 mcg) by mouth three times a week, Disp: , Rfl:     escitalopram (LEXAPRO) 20 MG tablet, Take 1 tablet (20 mg) by mouth daily, Disp: 90 tablet, Rfl: 1    fluticasone (FLONASE) 50 MCG/ACT nasal spray, SPRAY 1-2 SPRAYS INTO BOTH NOSTRILS DAILY AS NEEDED FOR ALLERGIES, Disp: 16 mL, Rfl: 11    hydroxychloroquine (PLAQUENIL) 200 MG tablet, TAKE 1 TABLET (200 MG) BY MOUTH DAILY GET ANNUAL EYE EXAMS FOR MONITORING AND FAX [phone], Disp: 90 tablet, Rfl: 0    insulin glargine (LANTUS SOLOSTAR) 100 UNIT/ML pen, INJECT 15 UNITS SUBCUTANEOUS AT BEDTIME, Disp: 30 mL, Rfl: 0    insulin lispro (HUMALOG KWIKPEN) 100 UNIT/ML (1 unit dial) KWIKPEN, Inject Subcutaneous 3 times daily (before meals) Sliding scale insulin; tests BG three times day If BG <150, no insulin given  If -200 take 2 units If -250 take 4 units If -300  take 6 units If -350 take 10 units If BG >350 take 14 units, Disp: , Rfl:     ketoconazole (NIZORAL) 2 % external cream, Apply topically 2 times daily as needed for itching, Disp: 60 g, Rfl: 1    levocetirizine (XYZAL) 5 MG tablet, TAKE 1 TABLET BY MOUTH EVERY DAY IN THE EVENING, Disp: 90 tablet, Rfl: 1    lidocaine (LIDODERM) 5 % patch, APPLY PATCH TO PAINFUL AREA FOR UP TO 12 H WITHIN A 24 H PERIOD. REMOVE AFTER 12 HOURS. (Patient taking differently: Apply patch to painful area for up to 12 h within a 24 h period.  Remove after 12 hours.), Disp: 30 patch, Rfl: 2    loratadine (CLARITIN) 10 MG tablet, TAKE 1 TABLET BY MOUTH EVERY DAY, Disp: 90 tablet, Rfl: 3    methocarbamol (ROBAXIN) 750 MG tablet, Take 1 tablet (750 mg) by mouth 3 times daily as needed for muscle spasms, Disp: 90 tablet, Rfl: 3    metoprolol succinate ER (TOPROL XL) 50 MG 24 hr tablet, TAKE 1 TABLET BY MOUTH TWICE A DAY, Disp: 180 tablet, Rfl: 3    mirabegron (MYRBETRIQ) 25 MG 24 hr tablet, Take 1 tablet (25 mg) by mouth daily, Disp: 30 tablet, Rfl: 11    montelukast (SINGULAIR) 10 MG tablet, TAKE 1 TABLET BY MOUTH EVERYDAY AT BEDTIME, Disp: 90 tablet, Rfl: 3    oxyCODONE-acetaminophen (PERCOCET) 5-325 MG tablet, Take 1 tablet by mouth every 8 hours as needed for pain., Disp: 60 tablet, Rfl: 0    pantoprazole (PROTONIX) 40 MG EC tablet, Take 1 tablet (40 mg) by mouth daily (replaces famotidine- should stop taking famotidine), Disp: 90 tablet, Rfl: 3    polyethylene glycol (MIRALAX) 17 g packet, Take 17 g by mouth daily, Disp: 10 packet, Rfl: 0    potassium chloride ER (KLOR-CON) 20 MEQ CR tablet, Take 2 tablets (40 mEq) by mouth every morning AND 1 tablet (20 mEq) every evening., Disp: 270 tablet, Rfl: 3    predniSONE (DELTASONE) 10 MG tablet, For gout flares, take the prednisone as 40-30-20-10 mg a day, each course x 3 days then go back to 5 mg a day, Disp: 30 tablet, Rfl: 3    predniSONE (DELTASONE) 20 MG tablet, 60-40-20-10 mg a day,  each course x 3 days then go back on 5 mg a day, Disp: 25 tablet, Rfl: 0    predniSONE (DELTASONE) 5 MG tablet, Take 1 tablet (5 mg) by mouth daily, Disp: 90 tablet, Rfl: 1    Semaglutide, 2 MG/DOSE, (OZEMPIC) 8 MG/3ML pen, Inject 2 mg Subcutaneous every 7 days, Disp: 9 mL, Rfl: 3    torsemide (DEMADEX) 20 MG tablet, TAKE 2 TABLETS (40 MG) BY MOUTH EVERY MORNING AND 1 TABLET (20 MG) DAILY AT 2 PM. TAKE THE AFTERNOON DOSE WITH AN EXTRA 10 MG TAB FOR A TOTAL OF 30 MG IN THE AFTERNOON, Disp: 270 tablet, Rfl: 3    traZODone (DESYREL) 50 MG tablet, Take 3 tablets (150 mg) by mouth at bedtime, Disp: 90 tablet, Rfl: 9    Current Facility-Administered Medications:     [START ON 12/10/2024] denosumab (PROLIA) injection 60 mg, 60 mg, Subcutaneous, Q6 Months,     Allergies: Amoxicillin-pot clavulanate, Amoxicillin-pot clavulanate, Codeine, Penicillins, Phenobarbital, and Seasonal allergies        Physical Exam:  On physical examination the patient appears the stated age, is in no acute distress, affect is appropriate, and breathing is non-labored.    LMP  (LMP Unknown)   There is no height or weight on file to calculate BMI.    Rises from chair: Easily  Gait: Normal  ROM: Fluid and painless with greater than 90 degrees of flexion and no symptoms with internal and external rotation and flexion  Sciatic motor function is normal and symmetric    We reviewed the radiographs together. These show the normal progression for a total hip arthroplasty without evidence of loosening or subsidence.       Assessment: 1 year status post right total hip arthroplasty doing very well.  We discussed activities on total hip.  We discussed follow-up.  All the patient's questions were answered to the best of my ability.    Plan: Follow-up in 5 years for radiographs.  Sooner if issues.  No ref. provider found

## 2024-08-29 NOTE — NURSING NOTE
Reason For Visit:   Chief Complaint   Patient presents with    RECHECK     Right SRI 5/31/23 - One year follow up       LMP  (LMP Unknown)          Ling Bethea ATC

## 2024-08-29 NOTE — PROGRESS NOTES
Subjective  WHAT:  Right  Hip  WHEN: 05/31/2023  HOW:  Status post right total hip arthroplasty    Betty is one year status post right total hip arthroplasty.  Overall the patient notes they are doing well.  The patient reports minimal right hip pain.  Betty is without concern for RLE swelling and currently ambulates with a walker for medication associated dizziness.  The patient reports she has been getting around well without right hip complications but is interested in being more active. Betty is has a Hx of DVT but denies calf pain.  Betty is currently experiencing a gout flare in left wrist for which she is taking prednisone with much relief.     HOOS: 92.34    Objective  Musculoskeletal:  Right Hip  Skin: skin intact, no rashes or lesions, surgical incision is benign and healed  Effusion:none  Swelling:none  Edema:None  Atrophy:absent  Deformity:none  Tenderness: Without TTP    Range of motion:with exception to posterior hip precautions ROM is within normal limits   Stability: normal    Neurovascular:  Light touch sensation intact all dermatomes tested  Intact motor strength, 5/5 at right tib ant, EHL, EDL, gastroc, FHL, FDL peroneals and posterior tib.   PT pulses intact    Imaging   3 view x-rays of the  right hip completed today at [facility] are reviewed and demonstrate:    The right total hip arthroplasty is stable without fractures or progressive lucencies.      Assessment  Status post right total hip arthroplasty completed on 05/31/2023, doing well at one year post-op    Plan  Betty is doing well overall postoperatively.  A thorough discussion was had today with the patient regarding their progress and continued precautions. Posterior hip precautions reviewed.  The patient will continue with activities as tolerated. Betty will follow-up as needed for re-assessment and repeat x-rays.  All other questions were answered today.

## 2024-09-03 DIAGNOSIS — I50.32 CHRONIC HEART FAILURE WITH PRESERVED EJECTION FRACTION (H): Primary | ICD-10-CM

## 2024-09-10 DIAGNOSIS — Z79.899 LONG-TERM USE OF PLAQUENIL: Primary | ICD-10-CM

## 2024-09-11 ENCOUNTER — OFFICE VISIT (OUTPATIENT)
Dept: OPHTHALMOLOGY | Facility: CLINIC | Age: 84
End: 2024-09-11
Attending: OPHTHALMOLOGY
Payer: COMMERCIAL

## 2024-09-11 ENCOUNTER — TELEPHONE (OUTPATIENT)
Dept: CARDIOLOGY | Facility: CLINIC | Age: 84
End: 2024-09-11
Payer: COMMERCIAL

## 2024-09-11 DIAGNOSIS — H52.03 HYPEROPIA OF BOTH EYES WITH ASTIGMATISM AND PRESBYOPIA: ICD-10-CM

## 2024-09-11 DIAGNOSIS — Z79.899 HIGH RISK MEDICATION USE: Primary | ICD-10-CM

## 2024-09-11 DIAGNOSIS — Z79.899 LONG-TERM USE OF PLAQUENIL: ICD-10-CM

## 2024-09-11 DIAGNOSIS — H52.4 HYPEROPIA OF BOTH EYES WITH ASTIGMATISM AND PRESBYOPIA: ICD-10-CM

## 2024-09-11 DIAGNOSIS — H04.123 DRY EYE SYNDROME OF BOTH EYES: ICD-10-CM

## 2024-09-11 DIAGNOSIS — H52.203 HYPEROPIA OF BOTH EYES WITH ASTIGMATISM AND PRESBYOPIA: ICD-10-CM

## 2024-09-11 DIAGNOSIS — Z96.1 PSEUDOPHAKIA OF BOTH EYES: ICD-10-CM

## 2024-09-11 PROCEDURE — 92014 COMPRE OPH EXAM EST PT 1/>: CPT | Performed by: OPHTHALMOLOGY

## 2024-09-11 PROCEDURE — G0463 HOSPITAL OUTPT CLINIC VISIT: HCPCS | Performed by: OPHTHALMOLOGY

## 2024-09-11 PROCEDURE — 92134 CPTRZ OPH DX IMG PST SGM RTA: CPT | Performed by: OPHTHALMOLOGY

## 2024-09-11 PROCEDURE — 92250 FUNDUS PHOTOGRAPHY W/I&R: CPT | Mod: 59 | Performed by: OPHTHALMOLOGY

## 2024-09-11 PROCEDURE — 99207 FUNDUS AUTOFLUORESCENCE IMAGE (FAF) OU (BOTH EYES): CPT | Mod: 26 | Performed by: OPHTHALMOLOGY

## 2024-09-11 PROCEDURE — 92082 INTERMEDIATE VISUAL FIELD XM: CPT | Performed by: OPHTHALMOLOGY

## 2024-09-11 PROCEDURE — 92015 DETERMINE REFRACTIVE STATE: CPT

## 2024-09-11 ASSESSMENT — REFRACTION_MANIFEST
OD_ADD: +2.50
OD_AXIS: 180
OD_SPHERE: PLANO
OS_ADD: +2.50
OS_CYLINDER: +0.25
OS_AXIS: 155
OS_SPHERE: PLANO
OD_CYLINDER: +0.75

## 2024-09-11 ASSESSMENT — SLIT LAMP EXAM - LIDS
COMMENTS: 2+ DERMATOCHALASIS
COMMENTS: 2+ DERMATOCHALASIS

## 2024-09-11 ASSESSMENT — REFRACTION_WEARINGRX
OS_ADD: +3.00
OD_AXIS: 173
OD_SPHERE: PLANO
SPECS_TYPE: BIFOCAL
OD_ADD: +3.00
OS_AXIS: 000
OS_SPHERE: PLANO
OD_CYLINDER: +0.50
OS_CYLINDER: SPHERE

## 2024-09-11 ASSESSMENT — CONF VISUAL FIELD
OS_INFERIOR_TEMPORAL_RESTRICTION: 0
OD_SUPERIOR_NASAL_RESTRICTION: 0
OD_INFERIOR_NASAL_RESTRICTION: 0
METHOD: COUNTING FINGERS
OS_INFERIOR_NASAL_RESTRICTION: 0
OD_INFERIOR_TEMPORAL_RESTRICTION: 0
OD_NORMAL: 1
OD_SUPERIOR_TEMPORAL_RESTRICTION: 0
OS_SUPERIOR_NASAL_RESTRICTION: 0
OS_SUPERIOR_TEMPORAL_RESTRICTION: 0
OS_NORMAL: 1

## 2024-09-11 ASSESSMENT — VISUAL ACUITY
OD_CC: 20/20
METHOD: SNELLEN - LINEAR
OS_CC+: +3
OD_CC+: -1
OS_CC: 20/25

## 2024-09-11 ASSESSMENT — CUP TO DISC RATIO
OS_RATIO: 0.3
OD_RATIO: 0.3

## 2024-09-11 ASSESSMENT — TONOMETRY
IOP_METHOD: ICARE
OD_IOP_MMHG: 10
OS_IOP_MMHG: 11

## 2024-09-11 ASSESSMENT — EXTERNAL EXAM - RIGHT EYE: OD_EXAM: NORMAL

## 2024-09-11 ASSESSMENT — EXTERNAL EXAM - LEFT EYE: OS_EXAM: NORMAL

## 2024-09-11 NOTE — NURSING NOTE
Chief Complaints and History of Present Illnesses   Patient presents with    Annual Eye Exam     Annual visit for Long-term use of Plaquenil      Chief Complaint(s) and History of Present Illness(es)       Annual Eye Exam              Associated symptoms: dryness.  Negative for eye pain, flashes and floaters    Treatments tried: eye drops    Comments: Annual visit for Long-term use of Plaquenil               Comments    Patient reports maybe some change in the left eye but only small amount if any.  Patient reports occasional intermittant sensation on peripheral of right eye. Patient denies flashes/new floaters.  Pt unsure if VA fluctuates with BS.   Ocular Meds:   AT's PRN     LBS: 247 this AM     Lab Results       Component                Value               Date                       A1C                      8.4                 04/25/2024                 A1C                      8.9                 01/16/2024                 A1C                      9.6                 10/04/2023                 A1C                      7.3                 03/14/2023                 A1C                      7.3                 03/09/2023                 A1C                      7.1                 06/04/2021                 A1C                      8.0                 03/03/2021                 A1C                      8.7                 10/20/2020                 A1C                      6.8                 06/19/2020                 A1C                      7.0                 01/21/2020              Melanie DE JESUS 1:14 PM September 11, 2024

## 2024-09-11 NOTE — TELEPHONE ENCOUNTER
9/11 Patient confirmed scheduled appointment:  Date: 9/12/2024  Time: 12:30 pm  Visit type: Return Heart Failure  Provider: Martell  [place] [facility] [place]imaging: echo and labs prior   Additional notes: n/a

## 2024-09-11 NOTE — PROGRESS NOTES
HPI       Annual Eye Exam    Associated symptoms include dryness.  Negative for eye pain, flashes and floaters.  Treatments tried include eye drops. Additional comments: Annual visit for Long-term use of Plaquenil              Comments    Patient reports maybe some change in the left eye but only small amount if any.  Patient reports occasional intermittant sensation on peripheral of right eye. Patient denies flashes/new floaters.  Pt unsure if VA fluctuates with BS.   Ocular Meds:   AT's PRN     LBS: 247 this AM     Lab Results       Component                Value               Date                       A1C                      8.4                 04/25/2024                 A1C                      8.9                 01/16/2024                 A1C                      9.6                 10/04/2023                 A1C                      7.3                 03/14/2023                 A1C                      7.3                 03/09/2023                 A1C                      7.1                 06/04/2021                 A1C                      8.0                 03/03/2021                 A1C                      8.7                 10/20/2020                 A1C                      6.8                 06/19/2020                 A1C                      7.0                 01/21/2020              Melanie aGrcia OA 1:14 PM September 11, 2024             Last edited by Melanie Garcia on 9/11/2024  1:15 PM.        Current Outpatient Medications   Medication Sig Dispense Refill    acyclovir (ZOVIRAX) 400 MG tablet Take 1 tablet (400 mg) by mouth every 8 hours For a couple days 15 tablet 2    acyclovir (ZOVIRAX) 5 % external ointment Apply topically as needed (6 times day PRN for outbreaks) As needed for outbreaks 15 g 3    albuterol (PROAIR HFA/PROVENTIL HFA/VENTOLIN HFA) 108 (90 Base) MCG/ACT inhaler Inhale 2 puffs into the lungs every 6 hours as needed for wheezing or shortness of breath      allopurinol  (ZYLOPRIM) 100 MG tablet TAKE 1 TABLET BY MOUTH EVERY DAY 90 tablet 1    anakinra (KINERET) 100 MG/0.67ML SOSY injection Inject 0.67 mLs (100 mg) Subcutaneous daily 20.1 mL 3    aspirin (ASA) 81 MG EC tablet Take 1 tablet (81 mg) by mouth 2 times daily 60 tablet 0    atorvastatin (LIPITOR) 10 MG tablet Take 0.5 tablets (5 mg) by mouth daily 45 tablet 2    azithromycin (ZITHROMAX) 250 MG tablet TAKE 1 TABLET BY MOUTH EVERY DAY 30 tablet 11    BD PEN NEEDLE JANE 2ND GEN 32G X 4 MM miscellaneous USE TO TEST 4 TIMES A  each 1    blood glucose (ACCU-CHEK SHANNON PLUS) test strip USE TO TEST BLOOD SUGARS 3 TIMES DAILY 300 strip 1    blood glucose (NO BRAND SPECIFIED) lancets standard Use to test blood sugar 3 times daily or as directed 100 Lancet 3    BREO ELLIPTA 100-25 MCG/ACT inhaler TAKE 1 PUFF BY MOUTH EVERY DAY 1 each 11    buPROPion (WELLBUTRIN XL) 150 MG 24 hr tablet TAKE 1 TABLET BY MOUTH EVERY MORNING 90 tablet 0    cevimeline (EVOXAC) 30 MG capsule Take 1 capsule (30 mg) by mouth 3 times daily as needed 270 capsule 3    Cholecalciferol (VITAMIN D3) 50 MCG (2000 UT) CAPS TAKE 100 MCG BY MOUTH DAILY (TAKE 2 TABLET (50 MCG) BY MOUTH DAILY - ORAL) 180 capsule 2    COMPOUNDED NON-CONTROLLED SUBSTANCE (CMPD RX) - PHARMACY TO MIX COMPOUNDED MEDICATION Estriol 1 mg/g Apply small amount to finger and apply to inside vagina daily for 2 weeks then twice weekly Route: vaginally Dispense 30 grams 11 refills 30 g 11    cyanocobalamin (VITAMIN B-12) 1000 MCG tablet Take 1 tablet (1,000 mcg) by mouth three times a week      escitalopram (LEXAPRO) 20 MG tablet Take 1 tablet (20 mg) by mouth daily 90 tablet 1    fluticasone (FLONASE) 50 MCG/ACT nasal spray SPRAY 1-2 SPRAYS INTO BOTH NOSTRILS DAILY AS NEEDED FOR ALLERGIES 16 mL 11    hydroxychloroquine (PLAQUENIL) 200 MG tablet TAKE 1 TABLET (200 MG) BY MOUTH DAILY GET ANNUAL EYE EXAMS FOR MONITORING AND FAX [phone] 90 tablet 0    insulin glargine (LANTUS SOLOSTAR)  100 UNIT/ML pen INJECT 15 UNITS SUBCUTANEOUS AT BEDTIME 30 mL 0    insulin lispro (HUMALOG KWIKPEN) 100 UNIT/ML (1 unit dial) KWIKPEN Inject Subcutaneous 3 times daily (before meals) Sliding scale insulin; tests BG three times day  If BG <150, no insulin given   If -200 take 2 units  If -250 take 4 units  If -300 take 6 units  If -350 take 10 units  If BG >350 take 14 units      ketoconazole (NIZORAL) 2 % external cream Apply topically 2 times daily as needed for itching 60 g 1    levocetirizine (XYZAL) 5 MG tablet TAKE 1 TABLET BY MOUTH EVERY DAY IN THE EVENING 90 tablet 1    lidocaine (LIDODERM) 5 % patch APPLY PATCH TO PAINFUL AREA FOR UP TO 12 H WITHIN A 24 H PERIOD. REMOVE AFTER 12 HOURS. (Patient taking differently: Apply patch to painful area for up to 12 h within a 24 h period.  Remove after 12 hours.) 30 patch 2    loratadine (CLARITIN) 10 MG tablet TAKE 1 TABLET BY MOUTH EVERY DAY 90 tablet 3    methocarbamol (ROBAXIN) 750 MG tablet Take 1 tablet (750 mg) by mouth 3 times daily as needed for muscle spasms 90 tablet 3    metoprolol succinate ER (TOPROL XL) 50 MG 24 hr tablet TAKE 1 TABLET BY MOUTH TWICE A  tablet 3    mirabegron (MYRBETRIQ) 25 MG 24 hr tablet Take 1 tablet (25 mg) by mouth daily 30 tablet 11    montelukast (SINGULAIR) 10 MG tablet TAKE 1 TABLET BY MOUTH EVERYDAY AT BEDTIME 90 tablet 3    oxyCODONE-acetaminophen (PERCOCET) 5-325 MG tablet Take 1 tablet by mouth every 8 hours as needed for pain. 60 tablet 0    pantoprazole (PROTONIX) 40 MG EC tablet Take 1 tablet (40 mg) by mouth daily (replaces famotidine- should stop taking famotidine) 90 tablet 3    polyethylene glycol (MIRALAX) 17 g packet Take 17 g by mouth daily 10 packet 0    potassium chloride ER (KLOR-CON) 20 MEQ CR tablet Take 2 tablets (40 mEq) by mouth every morning AND 1 tablet (20 mEq) every evening. 270 tablet 3    predniSONE (DELTASONE) 10 MG tablet For gout flares, take the prednisone as  40-30-20-10 mg a day, each course x 3 days then go back to 5 mg a day 30 tablet 3    predniSONE (DELTASONE) 20 MG tablet 60-40-20-10 mg a day, each course x 3 days then go back on 5 mg a day 25 tablet 0    predniSONE (DELTASONE) 5 MG tablet Take 1 tablet (5 mg) by mouth daily 90 tablet 1    Semaglutide, 2 MG/DOSE, (OZEMPIC) 8 MG/3ML pen Inject 2 mg Subcutaneous every 7 days 9 mL 3    torsemide (DEMADEX) 20 MG tablet TAKE 2 TABLETS (40 MG) BY MOUTH EVERY MORNING AND 1 TABLET (20 MG) DAILY AT 2 PM. TAKE THE AFTERNOON DOSE WITH AN EXTRA 10 MG TAB FOR A TOTAL OF 30 MG IN THE AFTERNOON 270 tablet 3    traZODone (DESYREL) 50 MG tablet Take 3 tablets (150 mg) by mouth at bedtime 90 tablet 9     Current Facility-Administered Medications   Medication Dose Route Frequency Provider Last Rate Last Admin    [START ON 12/10/2024] denosumab (PROLIA) injection 60 mg  60 mg Subcutaneous Q6 Months             HPI  Betty Tee is a 84 year old female with Sjogren's syndrome, PMR, afib, ANGELICA, HTN who presents annual exam. She is on plaquenil 200mg daily, down from 400mg. She notes intermittent FBS right eye. Mild decrease visual acuity left eye      POH: CE/IOL [facility] >5 years ago, Sjogren syndrome with dry eye  PMH: Type 2 DM, A fib, ANGELICA on CPAP  FHx: Glaucoma in sister and uncle    Current Ocular Meds  AT four times a day       Assessment & Plan    (Z79.899) Long-term use of Plaquenil  (primary encounter diagnosis)  (Z79.899) High risk medication use  - For PMR   - Plaquenil start 4/11/2019, 200mg at bedtime, decreased from 400mg at bedtime 8/2023  - Also Pred 5mg daily  - visual field mac top 4/15/2022   right eye nonspecific defects   Left eye nonspecific defects  - VF 10-2 (s/p dilation, 3/2022)   right eye:superior temporal and inferior temporal loss, reliable   left eye: superior loss, reliable  - FAF 3/1/22   right eye: tr stippled perifoveal hyperautofluoresence   left eye: normal  - OCT Mac  3/1/22   right eye: hyperreflectivity in OPL, otherwise normal   left eye: normal    - visual field mac top 8/2/23   right eye nonspecific defects, MD -1.1, sLV 2.8   Left eye global decreased total deviation, MD -7.3, sLV 2.0  - spectralis FAF 8/2/23   right eye: tr stippled perifoveal IN hypoautofluoresence, no bulls eye maculopathy   left eye: peripapillary hypoautorlfuorescence, no bulls eye maculopathy  - OCT Mac 8/2/23   right eye: drusen IN hyperreflectivity in RPE, normal IS/OS jx at fovea   left eye:nasal Epiretinal membrane, normal IS/OS jx at fovea  - OCT Mac 09/06/23    right eye: nasal Epiretinal membrane, drusen IN hyperreflectivity in RPE, normal IS/OS jx at fovea   left eye:nasal Epiretinal membrane, normal IS/OS jx at fovea  - visual field mac top  09/06/23    right eye nonspecific defects, MD -1.0, sLV 2.9   Left eye global decreased total deviation, MD -0.8, sLV 2.7    - visual field 10-2 9/11/24   Right eye nonspecific deficits, MD 0.3, PSd 2.7   Left eye IT deficits, MD -1.7,  PSD 4.0  - spectralis FAF 9/11/24   right eye: tr stippled perifoveal IN hypoautofluoresence, no bulls eye maculopathy   left eye: peripapillary hypoautorlfuorescence, no bulls eye maculopathy  - OCT Mac 9/11/24   right eye: drusen IN hyperreflectivity in RPE, normal IS/OS jx at fovea, tr nasal ERM   left eye:nasal Epiretinal membrane, normal IS/OS jx at fovea, tr nasal ERM  Low concern for plaquenil toxicity causing visual field deficit as resolved with lubrication  Repeat plaquenil screening annually    M35.00) Sjogren syndrome, unspecified (H)  (H04.123) Dry eye syndrome of both eyes  Doing well today  Continue AT four times a day      (H52.03,  H52.203,  H52.4) Hyperopia of both eyes with astigmatism and presbyopia  Patient has minimal change in hyperopia but a copy of today's glasses prescription was given.  The patient may wish to update the glasses if the lenses are scratched or the frames are too small.  Presbyopia  is difficulty seeing up close and is treated with bifocals or over the counter reading glasses      (Z96.1) Pseudophakia of both eyes  [facility] ~2018   -----------------------------------------------------------------------------------    Patient disposition:   Return for Annual Visit-v/t/d/MRx, 10-2 VF, OCT Macula, Fundus Autofluorescence, color plates.     Attending Physician Attestation:  Complete documentation of historical and exam elements from today's encounter can be found in the full encounter summary report (not reduplicated in this progress note).  I personally obtained the chief complaint(s) and history of present illness.  I confirmed and edited as necessary the review of systems, past medical/surgical history, family history, social history, and examination findings as documented by others; and I examined the patient myself.  I personally reviewed the relevant tests, images, and reports as documented above.  I formulated and edited as necessary the assessment and plan and discussed the findings and management plan with the patient and family. - Santy Lee MD

## 2024-09-12 ENCOUNTER — ANCILLARY PROCEDURE (OUTPATIENT)
Dept: CARDIOLOGY | Facility: CLINIC | Age: 84
End: 2024-09-12
Attending: INTERNAL MEDICINE
Payer: COMMERCIAL

## 2024-09-12 ENCOUNTER — LAB (OUTPATIENT)
Dept: LAB | Facility: CLINIC | Age: 84
End: 2024-09-12
Attending: INTERNAL MEDICINE
Payer: COMMERCIAL

## 2024-09-12 VITALS
SYSTOLIC BLOOD PRESSURE: 134 MMHG | WEIGHT: 181 LBS | DIASTOLIC BLOOD PRESSURE: 79 MMHG | OXYGEN SATURATION: 95 % | HEART RATE: 60 BPM | BODY MASS INDEX: 30.12 KG/M2

## 2024-09-12 DIAGNOSIS — E78.5 HYPERLIPIDEMIA, UNSPECIFIED HYPERLIPIDEMIA TYPE: ICD-10-CM

## 2024-09-12 DIAGNOSIS — I50.32 CHRONIC HEART FAILURE WITH PRESERVED EJECTION FRACTION (H): ICD-10-CM

## 2024-09-12 DIAGNOSIS — I50.32 CHRONIC HEART FAILURE WITH PRESERVED EJECTION FRACTION (H): Primary | ICD-10-CM

## 2024-09-12 DIAGNOSIS — I10 PRIMARY HYPERTENSION: ICD-10-CM

## 2024-09-12 DIAGNOSIS — E11.9 TYPE 2 DIABETES MELLITUS WITHOUT COMPLICATION, WITHOUT LONG-TERM CURRENT USE OF INSULIN (H): ICD-10-CM

## 2024-09-12 DIAGNOSIS — I48.19 PERSISTENT ATRIAL FIBRILLATION (H): ICD-10-CM

## 2024-09-12 LAB
ANION GAP SERPL CALCULATED.3IONS-SCNC: 11 MMOL/L (ref 7–15)
BUN SERPL-MCNC: 12.6 MG/DL (ref 8–23)
CALCIUM SERPL-MCNC: 9.3 MG/DL (ref 8.8–10.4)
CHLORIDE SERPL-SCNC: 98 MMOL/L (ref 98–107)
CREAT SERPL-MCNC: 0.96 MG/DL (ref 0.51–0.95)
EGFRCR SERPLBLD CKD-EPI 2021: 58 ML/MIN/1.73M2
GLUCOSE SERPL-MCNC: 428 MG/DL (ref 70–99)
HCO3 SERPL-SCNC: 28 MMOL/L (ref 22–29)
LVEF ECHO: NORMAL
NT-PROBNP SERPL-MCNC: 436 PG/ML (ref 0–1800)
POTASSIUM SERPL-SCNC: 4.2 MMOL/L (ref 3.4–5.3)
SODIUM SERPL-SCNC: 137 MMOL/L (ref 135–145)

## 2024-09-12 PROCEDURE — G2211 COMPLEX E/M VISIT ADD ON: HCPCS | Performed by: STUDENT IN AN ORGANIZED HEALTH CARE EDUCATION/TRAINING PROGRAM

## 2024-09-12 PROCEDURE — G0463 HOSPITAL OUTPT CLINIC VISIT: HCPCS | Performed by: STUDENT IN AN ORGANIZED HEALTH CARE EDUCATION/TRAINING PROGRAM

## 2024-09-12 PROCEDURE — 93306 TTE W/DOPPLER COMPLETE: CPT | Mod: GC | Performed by: INTERNAL MEDICINE

## 2024-09-12 PROCEDURE — 83880 ASSAY OF NATRIURETIC PEPTIDE: CPT | Performed by: PATHOLOGY

## 2024-09-12 PROCEDURE — 80048 BASIC METABOLIC PNL TOTAL CA: CPT | Performed by: PATHOLOGY

## 2024-09-12 PROCEDURE — 99215 OFFICE O/P EST HI 40 MIN: CPT | Performed by: STUDENT IN AN ORGANIZED HEALTH CARE EDUCATION/TRAINING PROGRAM

## 2024-09-12 PROCEDURE — 36415 COLL VENOUS BLD VENIPUNCTURE: CPT | Performed by: PATHOLOGY

## 2024-09-12 RX ORDER — METOPROLOL SUCCINATE 25 MG/1
25 TABLET, EXTENDED RELEASE ORAL 2 TIMES DAILY
Qty: 180 TABLET | Refills: 3 | Status: SHIPPED | OUTPATIENT
Start: 2024-09-12

## 2024-09-12 RX ORDER — PEN NEEDLE, DIABETIC 32GX 5/32"
NEEDLE, DISPOSABLE MISCELLANEOUS
Qty: 400 EACH | Refills: 1 | Status: SHIPPED | OUTPATIENT
Start: 2024-09-12

## 2024-09-12 ASSESSMENT — PAIN SCALES - GENERAL: PAINLEVEL: NO PAIN (0)

## 2024-09-12 NOTE — PATIENT INSTRUCTIONS
"Cardiology Providers you saw during your visit:  Dr. Moura    Medication changes:   Decrease Metoprolol to 25 mg twice a day     Follow up:   [facility] in 6 months with labs  Follow up with Dr Rosa in 1 year with labs and echo      Please call if you have :  1. Weight gain of more than 2 pounds in a day or 5 pounds in a week  2. Increased shortness of breath, swelling or bloating  3. Dizziness, lightheadedness   4. Any questions or concerns.       Follow the American Heart Association Diet and Lifestyle recommendations:  Limit saturated fat, trans fat, sodium, red meat, sweets and sugar-sweetened beverages. If you choose to eat red meat, compare labels and select the leanest cuts available.  Aim for at least 150 minutes of moderate physical activity or 75 minutes of vigorous physical activity - or an equal combination of both - each week.      During business hours: [phone], press option # 1 to schedule and leave a message for your care team.        After hours, weekends or holidays: On Call Cardiologist- [phone]   option #4 and ask to speak to the on-call Cardiologist. Inform them you are a CORE/heart failure patient at the [facility].      Heart Failure Support Group     dates:    Monday, , 1-2pm     Monday,  , 1-2pm     Monday,  , 1-2pm     Monday, , 1-2pm     Monday, May 6th, 1-2pm     Monday, , 1-2pm     Monday, , 1-2pm     Monday, , 1-2pm     Monday, , 1-2pm     Monday, , 1-2pm     Monday, , 1-2pm     Monday, , 1-2pm     Flor Velasquez, RN BSN CHFN  Cardiology Care Coordinator - Heart Failure/ [facility]   Questions and [phone]   First press #1 for the [facility] and \"To send a message to your care team\"    "

## 2024-09-12 NOTE — NURSING NOTE
Diet: Patient instructed regarding a heart failure healthy diet, including discussion of reduced fat and 2000 mg daily sodium restriction, daily weights, medication purpose and compliance, fluid restrictions and resources for patient and family to access for assistance with heart failure management.       Labs: Patient was given results of the laboratory testing obtained today and patient was instructed about when to return for the next laboratory testing.     Med Reconcile: Reviewed and verified all current medications with the patient. The updated medication list was printed and given to the patient.     Med changes: Decrease Metoprolol to 25 mg BID, she will send 2 weeks of BPs     Return Appointment: Patient given instructions regarding scheduling next clinic visit: [facility] in 6 months and Dr Rosa in 1 year with labs and echo     Patient stated she understood all health information given and agreed to call with further questions or concerns.     Flor Velasquez RN

## 2024-09-12 NOTE — NURSING NOTE
Chief Complaint   Patient presents with    Follow Up     RHF, Labs and echo prior     Vitals were taken and medications reconciled.    Vince Grayson, EMT  12:24 PM

## 2024-09-12 NOTE — PROGRESS NOTES
Advanced Heart Failure/Transplant Clinic Note    HPI  Dear colleagues,     I had the pleasure of seeing Ms. Betty Tee in the Cardiology clinic.  As you know, Ms. Betty Tee is a pleasant 84 year old female with a past medical history of interstitial lung disease (moderate restriction), Sjogren's syndrome, HTN, atrial fibrillation, diverticulitis s/p colectomy 2011, and chronic HFpEF who presents for follow up.    Patient overall reports stable degree of dyspnea recently. She denies dyspnea at rest, syncope, orthopnea, PND, chest pain, palpitations, abdominal pain, change in LE edema, or significant weight gain. Patient is compliant with her medications, but would like to reduced them if possible.    ROS:  A complete 12-point ROS was negative except as above.    PAST MEDICAL HISTORY:  Patient Active Problem List   Diagnosis    Temporomandibular joint disorder    Diverticulitis of colon    Osteopenia of multiple sites    Alcohol abuse, in remission    Restless legs syndrome (RLS)    Major depressive disorder, recurrent episode, moderate (H)    Essential hypertension with goal blood pressure less than 140/90    Colouterine fistula    Paroxysmal atrial fibrillation (H)    Left ventricular hypertrophy    Insomnia    Breast fibroadenoma    History of deep vein thrombosis (DVT) of lower extremity    Fatty liver disease, nonalcoholic    Rib pain    Gastroesophageal reflux disease without esophagitis    Enlarged lymph node    Sjogren's syndrome with lung involvement (H)    ANGELICA (obstructive sleep apnea)    ILD (interstitial lung disease) (H)    Lower GI bleed    (HFpEF) heart failure with preserved ejection fraction (H)    Type 2 diabetes mellitus with hyperglycemia, with long-term current use of insulin (H)    Hyperlipidemia LDL goal <100    Inflammatory arthritis    Follicular bronchiolitis (H)    Stage 3a chronic kidney disease (H)    Urge incontinence of urine    Osteoarthritis of right hip    Seasonal  allergic rhinitis due to pollen    Elevated parathyroid hormone    Persistent atrial fibrillation (H)    Vitamin D deficiency    GAVE (gastric antral vascular ectasia)    Iron deficiency anemia, unspecified iron deficiency anemia type    Gout, unspecified cause, unspecified chronicity, unspecified site    Gastroenteritis    Lactic acidosis    Recurrent cold sores    Seronegative rheumatoid arthritis (H)    Polyneuropathy associated with underlying disease (H24)    Age-related osteoporosis without current pathological fracture        FAMILY HISTORY:  Family History   Problem Relation Age of Onset    C.A.D. Mother 63        MI- first at age 63    Heart Disease Mother     Hypertension Mother     Cerebrovascular Disease Mother     Hyperlipidemia Mother     Coronary Artery Disease Mother         MI-first at age 63    Other - See Comments Mother         cerebrovascular disease     Alcohol/Drug Father     Alzheimer Disease Father     Dementia Father     Hypertension Father     Hyperlipidemia Father     Substance Abuse Father     Diabetes Sister     Hypertension Sister     Hyperlipidemia Sister     Substance Abuse Sister     Asthma Sister     C.A.D. Sister 52        Minor MI- age 50's    Heart Disease Sister     Hypertension Sister     Hypertension Sister     Cancer - colorectal Sister 48        Late 40's early 50's    Gastrointestinal Disease Sister         Diverticulitis    Lipids Sister     Lipids Sister     Diabetes Sister     Heart Disease Sister         CHF    Cancer Sister         lung, smoker    Substance Abuse Sister     Asthma Sister     Cancer Sister     Coronary Artery Disease Sister         minor MI-age 50's    Heart Disease Sister     Hypertension Sister     Hypertension Sister     Colon Cancer Sister     Diverticulitis Sister     Lung Cancer Sister     Heart Disease Sister         CHF    Diabetes Sister     Asthma Sister     Hypertension Brother     Prostate Cancer Brother 74        Dx'd age 74     Gastrointestinal Disease Brother         Diverticulitis    Parkinsonism Brother     Substance Abuse Brother     Prostate Cancer Brother     Hyperlipidemia Brother     Hypertension Brother     Prostate Cancer Brother     Diverticulitis Brother     Parkinsonism Brother     Breast Cancer Daughter     Breast Cancer Daughter     Diabetes Other     Hypertension Other     Anesthesia Reaction No family hx of     Deep Vein Thrombosis (DVT) No family hx of        SOCIAL HISTORY:  Social History     Tobacco Use    Smoking status: Former     Current packs/day: 0.00     Average packs/day: 0.5 packs/day for 41.6 years (20.8 ttl pk-yrs)     Types: Cigarettes     Start date:      Quit date: 2011     Years since quittin.1    Smokeless tobacco: Never    Tobacco comments:      ppd   Vaping Use    Vaping status: Never Used   Substance Use Topics    Alcohol use: No     Alcohol/week: 0.0 standard drinks of alcohol     Comment: In recovery beginning     Drug use: No        ALLERGIES:  Allergies   Allergen Reactions    Amoxicillin-Pot Clavulanate Nausea and Vomiting    Amoxicillin-Pot Clavulanate     Codeine Nausea and Vomiting     PN: LW Reaction: HIVES    Penicillins Nausea and Vomiting     PN: LW Reaction: GI Upset    Phenobarbital Itching    Seasonal Allergies        CURRENT MEDICATIONS:  Current Outpatient Medications   Medication Sig Dispense Refill    acyclovir (ZOVIRAX) 400 MG tablet Take 1 tablet (400 mg) by mouth every 8 hours For a couple days 15 tablet 2    acyclovir (ZOVIRAX) 5 % external ointment Apply topically as needed (6 times day PRN for outbreaks) As needed for outbreaks 15 g 3    albuterol (PROAIR HFA/PROVENTIL HFA/VENTOLIN HFA) 108 (90 Base) MCG/ACT inhaler Inhale 2 puffs into the lungs every 6 hours as needed for wheezing or shortness of breath      allopurinol (ZYLOPRIM) 100 MG tablet TAKE 1 TABLET BY MOUTH EVERY DAY 90 tablet 1    anakinra (KINERET) 100 MG/0.67ML SOSY injection Inject 0.67  mLs (100 mg) Subcutaneous daily 20.1 mL 3    aspirin (ASA) 81 MG EC tablet Take 1 tablet (81 mg) by mouth 2 times daily 60 tablet 0    atorvastatin (LIPITOR) 10 MG tablet Take 0.5 tablets (5 mg) by mouth daily 45 tablet 2    azithromycin (ZITHROMAX) 250 MG tablet TAKE 1 TABLET BY MOUTH EVERY DAY 30 tablet 11    BD PEN NEEDLE JANE 2ND GEN 32G X 4 MM miscellaneous USE TO TEST 4 TIMES A  each 1    blood glucose (ACCU-CHEK SHANNON PLUS) test strip USE TO TEST BLOOD SUGARS 3 TIMES DAILY 300 strip 1    blood glucose (NO BRAND SPECIFIED) lancets standard Use to test blood sugar 3 times daily or as directed 100 Lancet 3    BREO ELLIPTA 100-25 MCG/ACT inhaler TAKE 1 PUFF BY MOUTH EVERY DAY 1 each 11    buPROPion (WELLBUTRIN XL) 150 MG 24 hr tablet TAKE 1 TABLET BY MOUTH EVERY MORNING 90 tablet 0    cevimeline (EVOXAC) 30 MG capsule Take 1 capsule (30 mg) by mouth 3 times daily as needed 270 capsule 3    Cholecalciferol (VITAMIN D3) 50 MCG (2000 UT) CAPS TAKE 100 MCG BY MOUTH DAILY (TAKE 2 TABLET (50 MCG) BY MOUTH DAILY - ORAL) 180 capsule 2    COMPOUNDED NON-CONTROLLED SUBSTANCE (CMPD RX) - PHARMACY TO MIX COMPOUNDED MEDICATION Estriol 1 mg/g Apply small amount to finger and apply to inside vagina daily for 2 weeks then twice weekly Route: vaginally Dispense 30 grams 11 refills 30 g 11    cyanocobalamin (VITAMIN B-12) 1000 MCG tablet Take 1 tablet (1,000 mcg) by mouth three times a week      escitalopram (LEXAPRO) 20 MG tablet Take 1 tablet (20 mg) by mouth daily 90 tablet 1    fluticasone (FLONASE) 50 MCG/ACT nasal spray SPRAY 1-2 SPRAYS INTO BOTH NOSTRILS DAILY AS NEEDED FOR ALLERGIES 16 mL 11    hydroxychloroquine (PLAQUENIL) 200 MG tablet TAKE 1 TABLET (200 MG) BY MOUTH DAILY GET ANNUAL EYE EXAMS FOR MONITORING AND FAX [phone] 90 tablet 0    insulin glargine (LANTUS SOLOSTAR) 100 UNIT/ML pen INJECT 15 UNITS SUBCUTANEOUS AT BEDTIME 30 mL 0    insulin lispro (HUMALOG KWIKPEN) 100 UNIT/ML (1 unit dial) KWIKPEN  Inject Subcutaneous 3 times daily (before meals) Sliding scale insulin; tests BG three times day  If BG <150, no insulin given   If -200 take 2 units  If -250 take 4 units  If -300 take 6 units  If -350 take 10 units  If BG >350 take 14 units      ketoconazole (NIZORAL) 2 % external cream Apply topically 2 times daily as needed for itching 60 g 1    levocetirizine (XYZAL) 5 MG tablet TAKE 1 TABLET BY MOUTH EVERY DAY IN THE EVENING 90 tablet 1    lidocaine (LIDODERM) 5 % patch APPLY PATCH TO PAINFUL AREA FOR UP TO 12 H WITHIN A 24 H PERIOD. REMOVE AFTER 12 HOURS. (Patient taking differently: Apply patch to painful area for up to 12 h within a 24 h period.  Remove after 12 hours.) 30 patch 2    loratadine (CLARITIN) 10 MG tablet TAKE 1 TABLET BY MOUTH EVERY DAY 90 tablet 3    methocarbamol (ROBAXIN) 750 MG tablet Take 1 tablet (750 mg) by mouth 3 times daily as needed for muscle spasms 90 tablet 3    metoprolol succinate ER (TOPROL XL) 25 MG 24 hr tablet Take 1 tablet (25 mg) by mouth 2 times daily. 180 tablet 3    mirabegron (MYRBETRIQ) 25 MG 24 hr tablet Take 1 tablet (25 mg) by mouth daily 30 tablet 11    montelukast (SINGULAIR) 10 MG tablet TAKE 1 TABLET BY MOUTH EVERYDAY AT BEDTIME 90 tablet 3    oxyCODONE-acetaminophen (PERCOCET) 5-325 MG tablet Take 1 tablet by mouth every 8 hours as needed for pain. 60 tablet 0    pantoprazole (PROTONIX) 40 MG EC tablet Take 1 tablet (40 mg) by mouth daily (replaces famotidine- should stop taking famotidine) 90 tablet 3    polyethylene glycol (MIRALAX) 17 g packet Take 17 g by mouth daily 10 packet 0    potassium chloride ER (KLOR-CON) 20 MEQ CR tablet Take 2 tablets (40 mEq) by mouth every morning AND 1 tablet (20 mEq) every evening. 270 tablet 3    predniSONE (DELTASONE) 10 MG tablet For gout flares, take the prednisone as 40-30-20-10 mg a day, each course x 3 days then go back to 5 mg a day 30 tablet 3    predniSONE (DELTASONE) 20 MG tablet  60-40-20-10 mg a day, each course x 3 days then go back on 5 mg a day 25 tablet 0    predniSONE (DELTASONE) 5 MG tablet Take 1 tablet (5 mg) by mouth daily 90 tablet 1    Semaglutide, 2 MG/DOSE, (OZEMPIC) 8 MG/3ML pen Inject 2 mg Subcutaneous every 7 days 9 mL 3    torsemide (DEMADEX) 20 MG tablet TAKE 2 TABLETS (40 MG) BY MOUTH EVERY MORNING AND 1 TABLET (20 MG) DAILY AT 2 PM. TAKE THE AFTERNOON DOSE WITH AN EXTRA 10 MG TAB FOR A TOTAL OF 30 MG IN THE AFTERNOON 270 tablet 3    traZODone (DESYREL) 50 MG tablet Take 3 tablets (150 mg) by mouth at bedtime 90 tablet 9       EXAM:  /79 (BP Location: Right arm, Patient Position: Chair, Cuff Size: Adult Regular)   Pulse 60   Wt 82.1 kg (181 lb)   LMP  (LMP Unknown)   SpO2 95%   BMI 30.12 kg/m      General: appears comfortable, alert and interactive, in no acute distress  Head: normocephalic, atraumatic  Eyes: anicteric sclera, EOMI  Mouth: MMM  Neck: supple, no cervical adenopathy  CV: regular rate and rhythm, no murmur, gallop, rub, estimated JVP ~8 cm  Resp: clear, no rales or wheezing  GI: soft, nontender, nondistended  Extremities: warm, no peripheral edema, 2+ bilateral radial pulses  Neurological: alert and oriented, no focal deficits  Psych: normal mood and affect  Derm: no rashes on exposed surfaces    Weight  Wt Readings from Last 10 Encounters:   09/12/24 82.1 kg (181 lb)   08/23/24 80.4 kg (177 lb 3.2 oz)   08/16/24 81.2 kg (179 lb)   07/08/24 81.6 kg (180 lb)   05/16/24 82.6 kg (182 lb 3.2 oz)   04/30/24 82.6 kg (182 lb 3.2 oz)   03/07/24 83.9 kg (184 lb 14.4 oz)   02/28/24 84.4 kg (186 lb)   01/30/24 82.4 kg (181 lb 11.2 oz)   11/03/23 80.2 kg (176 lb 12.8 oz)       I personally reviewed recent labs and data as below and discussed the results with the patient in clinic today.  Labs:  CBC RESULTS:  Lab Results   Component Value Date    WBC 9.4 07/08/2024    WBC 8.6 06/04/2021    RBC 4.41 07/08/2024    RBC 4.46 06/04/2021    HGB 12.9 07/08/2024     HGB 13.4 06/04/2021    HCT 40.9 07/08/2024    HCT 42.1 06/04/2021    MCV 93 07/08/2024    MCV 94 06/04/2021    MCH 29.3 07/08/2024    MCH 30.0 06/04/2021    MCHC 31.5 07/08/2024    MCHC 31.8 06/04/2021    RDW 13.9 07/08/2024    RDW 15.5 (H) 06/04/2021     07/08/2024     06/04/2021       CMP RESULTS:  Lab Results   Component Value Date     09/12/2024     06/04/2021    POTASSIUM 4.2 09/12/2024    POTASSIUM 4.1 08/23/2022    POTASSIUM 4.0 06/04/2021    CHLORIDE 98 09/12/2024    CHLORIDE 107 08/23/2022    CHLORIDE 106 06/04/2021    CO2 28 09/12/2024    CO2 22 08/23/2022    CO2 25 06/04/2021    ANIONGAP 11 09/12/2024    ANIONGAP 8 08/23/2022    ANIONGAP 6 06/04/2021     (H) 09/12/2024     (H) 06/05/2023     (H) 08/23/2022     (H) 06/04/2021    BUN 12.6 09/12/2024    BUN 17 08/23/2022    BUN 14 06/04/2021    CR 0.96 (H) 09/12/2024    CR 1.11 (H) 06/04/2021    GFRESTIMATED 58 (L) 09/12/2024    GFRESTIMATED 47 (L) 06/04/2021    GFRESTBLACK 54 (L) 06/04/2021    CLAUDY 9.3 09/12/2024    CLAUDY 8.8 06/04/2021    BILITOTAL 0.3 08/01/2023    BILITOTAL 0.6 12/04/2020    ALBUMIN 4.2 01/16/2024    ALBUMIN 3.2 (L) 08/23/2022    ALBUMIN 3.5 06/04/2021    ALKPHOS 106 (H) 08/01/2023    ALKPHOS 95 12/04/2020    ALT 12 07/08/2024    ALT 25 06/04/2021    AST 20 07/08/2024    AST 17 06/04/2021        Recent Labs   Lab Test 04/25/24  1635 03/14/23  1657   CHOL 128 82   HDL 75 42*   LDL 30 22   TRIG 117 90        Testing/Procedures:  I personally visualized and interpreted:  Cardiac Monitor 5/24/23 shows no sustained arrhythmias or pauses    Echocardiogram 9/7/23  Interpretation Summary  Global and regional left ventricular function is normal with an EF of 55-60%.  Grade II or moderate diastolic dysfunction.  Right ventricular function, chamber size, wall motion, and thickness are  normal.  The inferior vena cava was normal in size with preserved respiratory  variability.  No significant  valvular abnormalities present.  This study was compared with the study from 2022 .  No significant changes noted.    TTE 9/12/24  Interpretation Summary  Global and regional left ventricular function is normal with an EF of 60-65%.  Diastolic Doppler findings (E/E' ratio and/or other parameters) suggest left ventricular filling pressures are increased.  Global right ventricular function is normal.  The inferior vena cava was normal in size with preserved respiratory  variability.  This study was compared with the study from 9/7/2023. There are no significant changes.    Outside results of note:  Outside records were obtained and relevant results/notes have been incorporated into HPI.    Assessment and Plan:     In summary, 84F with interstitial lung disease (moderate restriction), Sjogren's syndrome, HTN, atrial fibrillation, diverticulitis s/p colectomy 2011 and chronic HFpEF who presents for follow up.    Chronic HFpEF  Mainstay of HFpEF treatment includes maintaining normotension, euvolemia, sinus rhythm if possible. SGLT2 inhibitors are currently the only medications that have proven evidence with benefit against HFpEF, and have received a class 2a recommendation.  Sacubitril/valsartan and spironolactone have also had some evidence as well, receiving class 2b recommendation.  Importantly, recommendation for beta blockers have been removed as there is some evidence for harm, thus preferential to avoid if possible.  - Euvolemic on torsemide 40 mg in AM, 20 mg in PM  - Decrease metoprolol XL to 25 mg BID  - Unable to use SGLT2i given cost    PAF  Now in sinus.  - Decrease metoprolol XL to 25 mg BID  - Not on AC given high risk of bleeding    HTN   Well controlled.  - Continue management as above    HLD  DM Type II  - Continue ASA 81 mg daily and atorvastatin 5 mg daily    Optimal Vascular Metrics    Blood Pressure   BP < 140/90 Yes    On Aspirin  Yes    On Statin  Yes    Tobacco use  No       To Do:  - Decrease  metoprolol XL to 25 mg BID  - Follow up in [facility] in 6 months  - Follow up in one year with labs and echo prior    The patient states understanding and is agreeable with plan.   Feel free to contact myself regarding questions or concerns. It was a pleasure to see this patient today.    A total of 43 minutes was spent on the day of the visit, which includes preparation for the visit (reviewing previous medical records, laboratories and investigations), in conjunction with the actual clinic visit with the patient, which includes obtaining a history and physical exam, creating and reviewing the care plan, patient education (and family if present), counseling, documenting clinical information in the electronic health record and care coordination.     The longitudinal plan of care for the diagnosis(es)/condition(s) as documented were addressed during this visit. Due to the added complexity in care, I will continue to support Betty in the subsequent management and with ongoing continuity of care.     Archana Moura MD   of Medicine, [facility]  Advanced Heart Failure and Transplant Cardiology       Moe, Radha Tristan, Ilene Mahan

## 2024-09-12 NOTE — LETTER
9/12/2024      RE: Betty Tee  [address]  [place] [address]       Dear Colleague,    Thank you for the opportunity to participate in the care of your patient, Betty Tee, at the [facility] [place] at [facility]. Please see a copy of my visit note below.    Advanced Heart Failure/Transplant Clinic Note    HPI  Dear colleagues,     I had the pleasure of seeing Ms. Betty Tee in the Cardiology clinic.  As you know, Ms. Betty Tee is a pleasant 84 year old female with a past medical history of interstitial lung disease (moderate restriction), Sjogren's syndrome, HTN, atrial fibrillation, diverticulitis s/p colectomy 2011, and chronic HFpEF who presents for follow up.    Patient overall reports stable degree of dyspnea recently. She denies dyspnea at rest, syncope, orthopnea, PND, chest pain, palpitations, abdominal pain, change in LE edema, or significant weight gain. Patient is compliant with her medications, but would like to reduced them if possible.    ROS:  A complete 12-point ROS was negative except as above.    PAST MEDICAL HISTORY:  Patient Active Problem List   Diagnosis     Temporomandibular joint disorder     Diverticulitis of colon     Osteopenia of multiple sites     Alcohol abuse, in remission     Restless legs syndrome (RLS)     Major depressive disorder, recurrent episode, moderate (H)     Essential hypertension with goal blood pressure less than 140/90     Colouterine fistula     Paroxysmal atrial fibrillation (H)     Left ventricular hypertrophy     Insomnia     Breast fibroadenoma     History of deep vein thrombosis (DVT) of lower extremity     Fatty liver disease, nonalcoholic     Rib pain     Gastroesophageal reflux disease without esophagitis     Enlarged lymph node     Sjogren's syndrome with lung involvement (H)     ANGELICA (obstructive sleep apnea)     ILD (interstitial lung disease) (H)      Lower GI bleed     (HFpEF) heart failure with preserved ejection fraction (H)     Type 2 diabetes mellitus with hyperglycemia, with long-term current use of insulin (H)     Hyperlipidemia LDL goal <100     Inflammatory arthritis     Follicular bronchiolitis (H)     Stage 3a chronic kidney disease (H)     Urge incontinence of urine     Osteoarthritis of right hip     Seasonal allergic rhinitis due to pollen     Elevated parathyroid hormone     Persistent atrial fibrillation (H)     Vitamin D deficiency     GAVE (gastric antral vascular ectasia)     Iron deficiency anemia, unspecified iron deficiency anemia type     Gout, unspecified cause, unspecified chronicity, unspecified site     Gastroenteritis     Lactic acidosis     Recurrent cold sores     Seronegative rheumatoid arthritis (H)     Polyneuropathy associated with underlying disease (H24)     Age-related osteoporosis without current pathological fracture        FAMILY HISTORY:  Family History   Problem Relation Age of Onset     C.A.D. Mother 63        MI- first at age 63     Heart Disease Mother      Hypertension Mother      Cerebrovascular Disease Mother      Hyperlipidemia Mother      Coronary Artery Disease Mother         MI-first at age 63     Other - See Comments Mother         cerebrovascular disease      Alcohol/Drug Father      Alzheimer Disease Father      Dementia Father      Hypertension Father      Hyperlipidemia Father      Substance Abuse Father      Diabetes Sister      Hypertension Sister      Hyperlipidemia Sister      Substance Abuse Sister      Asthma Sister      C.A.D. Sister 52        Minor MI- age 50's     Heart Disease Sister      Hypertension Sister      Hypertension Sister      Cancer - colorectal Sister 48        Late 40's early 50's     Gastrointestinal Disease Sister         Diverticulitis     Lipids Sister      Lipids Sister      Diabetes Sister      Heart Disease Sister         CHF     Cancer Sister         lung, smoker      Substance Abuse Sister      Asthma Sister      Cancer Sister      Coronary Artery Disease Sister         minor MI-age 50's     Heart Disease Sister      Hypertension Sister      Hypertension Sister      Colon Cancer Sister      Diverticulitis Sister      Lung Cancer Sister      Heart Disease Sister         CHF     Diabetes Sister      Asthma Sister      Hypertension Brother      Prostate Cancer Brother 74        Dx'd age 74     Gastrointestinal Disease Brother         Diverticulitis     Parkinsonism Brother      Substance Abuse Brother      Prostate Cancer Brother      Hyperlipidemia Brother      Hypertension Brother      Prostate Cancer Brother      Diverticulitis Brother      Parkinsonism Brother      Breast Cancer Daughter      Breast Cancer Daughter      Diabetes Other      Hypertension Other      Anesthesia Reaction No family hx of      Deep Vein Thrombosis (DVT) No family hx of        SOCIAL HISTORY:  Social History     Tobacco Use     Smoking status: Former     Current packs/day: 0.00     Average packs/day: 0.5 packs/day for 41.6 years (20.8 ttl pk-yrs)     Types: Cigarettes     Start date:      Quit date: 2011     Years since quittin.1     Smokeless tobacco: Never     Tobacco comments:      ppd   Vaping Use     Vaping status: Never Used   Substance Use Topics     Alcohol use: No     Alcohol/week: 0.0 standard drinks of alcohol     Comment: In recovery beginning      Drug use: No        ALLERGIES:  Allergies   Allergen Reactions     Amoxicillin-Pot Clavulanate Nausea and Vomiting     Amoxicillin-Pot Clavulanate      Codeine Nausea and Vomiting     PN: LW Reaction: HIVES     Penicillins Nausea and Vomiting     PN: LW Reaction: GI Upset     Phenobarbital Itching     Seasonal Allergies        CURRENT MEDICATIONS:  Current Outpatient Medications   Medication Sig Dispense Refill     acyclovir (ZOVIRAX) 400 MG tablet Take 1 tablet (400 mg) by mouth every 8 hours For a couple days 15 tablet 2      acyclovir (ZOVIRAX) 5 % external ointment Apply topically as needed (6 times day PRN for outbreaks) As needed for outbreaks 15 g 3     albuterol (PROAIR HFA/PROVENTIL HFA/VENTOLIN HFA) 108 (90 Base) MCG/ACT inhaler Inhale 2 puffs into the lungs every 6 hours as needed for wheezing or shortness of breath       allopurinol (ZYLOPRIM) 100 MG tablet TAKE 1 TABLET BY MOUTH EVERY DAY 90 tablet 1     anakinra (KINERET) 100 MG/0.67ML SOSY injection Inject 0.67 mLs (100 mg) Subcutaneous daily 20.1 mL 3     aspirin (ASA) 81 MG EC tablet Take 1 tablet (81 mg) by mouth 2 times daily 60 tablet 0     atorvastatin (LIPITOR) 10 MG tablet Take 0.5 tablets (5 mg) by mouth daily 45 tablet 2     azithromycin (ZITHROMAX) 250 MG tablet TAKE 1 TABLET BY MOUTH EVERY DAY 30 tablet 11     BD PEN NEEDLE JANE 2ND GEN 32G X 4 MM miscellaneous USE TO TEST 4 TIMES A  each 1     blood glucose (ACCU-CHEK SHANNON PLUS) test strip USE TO TEST BLOOD SUGARS 3 TIMES DAILY 300 strip 1     blood glucose (NO BRAND SPECIFIED) lancets standard Use to test blood sugar 3 times daily or as directed 100 Lancet 3     BREO ELLIPTA 100-25 MCG/ACT inhaler TAKE 1 PUFF BY MOUTH EVERY DAY 1 each 11     buPROPion (WELLBUTRIN XL) 150 MG 24 hr tablet TAKE 1 TABLET BY MOUTH EVERY MORNING 90 tablet 0     cevimeline (EVOXAC) 30 MG capsule Take 1 capsule (30 mg) by mouth 3 times daily as needed 270 capsule 3     Cholecalciferol (VITAMIN D3) 50 MCG (2000 UT) CAPS TAKE 100 MCG BY MOUTH DAILY (TAKE 2 TABLET (50 MCG) BY MOUTH DAILY - ORAL) 180 capsule 2     COMPOUNDED NON-CONTROLLED SUBSTANCE (CMPD RX) - PHARMACY TO MIX COMPOUNDED MEDICATION Estriol 1 mg/g Apply small amount to finger and apply to inside vagina daily for 2 weeks then twice weekly Route: vaginally Dispense 30 grams 11 refills 30 g 11     cyanocobalamin (VITAMIN B-12) 1000 MCG tablet Take 1 tablet (1,000 mcg) by mouth three times a week       escitalopram (LEXAPRO) 20 MG tablet Take 1 tablet (20 mg) by  mouth daily 90 tablet 1     fluticasone (FLONASE) 50 MCG/ACT nasal spray SPRAY 1-2 SPRAYS INTO BOTH NOSTRILS DAILY AS NEEDED FOR ALLERGIES 16 mL 11     hydroxychloroquine (PLAQUENIL) 200 MG tablet TAKE 1 TABLET (200 MG) BY MOUTH DAILY GET ANNUAL EYE EXAMS FOR MONITORING AND FAX [phone] 90 tablet 0     insulin glargine (LANTUS SOLOSTAR) 100 UNIT/ML pen INJECT 15 UNITS SUBCUTANEOUS AT BEDTIME 30 mL 0     insulin lispro (HUMALOG KWIKPEN) 100 UNIT/ML (1 unit dial) KWIKPEN Inject Subcutaneous 3 times daily (before meals) Sliding scale insulin; tests BG three times day  If BG <150, no insulin given   If -200 take 2 units  If -250 take 4 units  If -300 take 6 units  If -350 take 10 units  If BG >350 take 14 units       ketoconazole (NIZORAL) 2 % external cream Apply topically 2 times daily as needed for itching 60 g 1     levocetirizine (XYZAL) 5 MG tablet TAKE 1 TABLET BY MOUTH EVERY DAY IN THE EVENING 90 tablet 1     lidocaine (LIDODERM) 5 % patch APPLY PATCH TO PAINFUL AREA FOR UP TO 12 H WITHIN A 24 H PERIOD. REMOVE AFTER 12 HOURS. (Patient taking differently: Apply patch to painful area for up to 12 h within a 24 h period.  Remove after 12 hours.) 30 patch 2     loratadine (CLARITIN) 10 MG tablet TAKE 1 TABLET BY MOUTH EVERY DAY 90 tablet 3     methocarbamol (ROBAXIN) 750 MG tablet Take 1 tablet (750 mg) by mouth 3 times daily as needed for muscle spasms 90 tablet 3     metoprolol succinate ER (TOPROL XL) 25 MG 24 hr tablet Take 1 tablet (25 mg) by mouth 2 times daily. 180 tablet 3     mirabegron (MYRBETRIQ) 25 MG 24 hr tablet Take 1 tablet (25 mg) by mouth daily 30 tablet 11     montelukast (SINGULAIR) 10 MG tablet TAKE 1 TABLET BY MOUTH EVERYDAY AT BEDTIME 90 tablet 3     oxyCODONE-acetaminophen (PERCOCET) 5-325 MG tablet Take 1 tablet by mouth every 8 hours as needed for pain. 60 tablet 0     pantoprazole (PROTONIX) 40 MG EC tablet Take 1 tablet (40 mg) by mouth daily (replaces  famotidine- should stop taking famotidine) 90 tablet 3     polyethylene glycol (MIRALAX) 17 g packet Take 17 g by mouth daily 10 packet 0     potassium chloride ER (KLOR-CON) 20 MEQ CR tablet Take 2 tablets (40 mEq) by mouth every morning AND 1 tablet (20 mEq) every evening. 270 tablet 3     predniSONE (DELTASONE) 10 MG tablet For gout flares, take the prednisone as 40-30-20-10 mg a day, each course x 3 days then go back to 5 mg a day 30 tablet 3     predniSONE (DELTASONE) 20 MG tablet 60-40-20-10 mg a day, each course x 3 days then go back on 5 mg a day 25 tablet 0     predniSONE (DELTASONE) 5 MG tablet Take 1 tablet (5 mg) by mouth daily 90 tablet 1     Semaglutide, 2 MG/DOSE, (OZEMPIC) 8 MG/3ML pen Inject 2 mg Subcutaneous every 7 days 9 mL 3     torsemide (DEMADEX) 20 MG tablet TAKE 2 TABLETS (40 MG) BY MOUTH EVERY MORNING AND 1 TABLET (20 MG) DAILY AT 2 PM. TAKE THE AFTERNOON DOSE WITH AN EXTRA 10 MG TAB FOR A TOTAL OF 30 MG IN THE AFTERNOON 270 tablet 3     traZODone (DESYREL) 50 MG tablet Take 3 tablets (150 mg) by mouth at bedtime 90 tablet 9       EXAM:  /79 (BP Location: Right arm, Patient Position: Chair, Cuff Size: Adult Regular)   Pulse 60   Wt 82.1 kg (181 lb)   LMP  (LMP Unknown)   SpO2 95%   BMI 30.12 kg/m      General: appears comfortable, alert and interactive, in no acute distress  Head: normocephalic, atraumatic  Eyes: anicteric sclera, EOMI  Mouth: MMM  Neck: supple, no cervical adenopathy  CV: regular rate and rhythm, no murmur, gallop, rub, estimated JVP ~8 cm  Resp: clear, no rales or wheezing  GI: soft, nontender, nondistended  Extremities: warm, no peripheral edema, 2+ bilateral radial pulses  Neurological: alert and oriented, no focal deficits  Psych: normal mood and affect  Derm: no rashes on exposed surfaces    Weight  Wt Readings from Last 10 Encounters:   09/12/24 82.1 kg (181 lb)   08/23/24 80.4 kg (177 lb 3.2 oz)   08/16/24 81.2 kg (179 lb)   07/08/24 81.6 kg (180 lb)    05/16/24 82.6 kg (182 lb 3.2 oz)   04/30/24 82.6 kg (182 lb 3.2 oz)   03/07/24 83.9 kg (184 lb 14.4 oz)   02/28/24 84.4 kg (186 lb)   01/30/24 82.4 kg (181 lb 11.2 oz)   11/03/23 80.2 kg (176 lb 12.8 oz)       I personally reviewed recent labs and data as below and discussed the results with the patient in clinic today.  Labs:  CBC RESULTS:  Lab Results   Component Value Date    WBC 9.4 07/08/2024    WBC 8.6 06/04/2021    RBC 4.41 07/08/2024    RBC 4.46 06/04/2021    HGB 12.9 07/08/2024    HGB 13.4 06/04/2021    HCT 40.9 07/08/2024    HCT 42.1 06/04/2021    MCV 93 07/08/2024    MCV 94 06/04/2021    MCH 29.3 07/08/2024    MCH 30.0 06/04/2021    MCHC 31.5 07/08/2024    MCHC 31.8 06/04/2021    RDW 13.9 07/08/2024    RDW 15.5 (H) 06/04/2021     07/08/2024     06/04/2021       CMP RESULTS:  Lab Results   Component Value Date     09/12/2024     06/04/2021    POTASSIUM 4.2 09/12/2024    POTASSIUM 4.1 08/23/2022    POTASSIUM 4.0 06/04/2021    CHLORIDE 98 09/12/2024    CHLORIDE 107 08/23/2022    CHLORIDE 106 06/04/2021    CO2 28 09/12/2024    CO2 22 08/23/2022    CO2 25 06/04/2021    ANIONGAP 11 09/12/2024    ANIONGAP 8 08/23/2022    ANIONGAP 6 06/04/2021     (H) 09/12/2024     (H) 06/05/2023     (H) 08/23/2022     (H) 06/04/2021    BUN 12.6 09/12/2024    BUN 17 08/23/2022    BUN 14 06/04/2021    CR 0.96 (H) 09/12/2024    CR 1.11 (H) 06/04/2021    GFRESTIMATED 58 (L) 09/12/2024    GFRESTIMATED 47 (L) 06/04/2021    GFRESTBLACK 54 (L) 06/04/2021    CLAUDY 9.3 09/12/2024    CLAUDY 8.8 06/04/2021    BILITOTAL 0.3 08/01/2023    BILITOTAL 0.6 12/04/2020    ALBUMIN 4.2 01/16/2024    ALBUMIN 3.2 (L) 08/23/2022    ALBUMIN 3.5 06/04/2021    ALKPHOS 106 (H) 08/01/2023    ALKPHOS 95 12/04/2020    ALT 12 07/08/2024    ALT 25 06/04/2021    AST 20 07/08/2024    AST 17 06/04/2021        Recent Labs   Lab Test 04/25/24  1635 03/14/23  1657   CHOL 128 82   HDL 75 42*   LDL 30 22   TRIG 117 90         Testing/Procedures:  I personally visualized and interpreted:  Cardiac Monitor 5/24/23 shows no sustained arrhythmias or pauses    Echocardiogram 9/7/23  Interpretation Summary  Global and regional left ventricular function is normal with an EF of 55-60%.  Grade II or moderate diastolic dysfunction.  Right ventricular function, chamber size, wall motion, and thickness are  normal.  The inferior vena cava was normal in size with preserved respiratory  variability.  No significant valvular abnormalities present.  This study was compared with the study from 2022 .  No significant changes noted.    TTE 9/12/24  Interpretation Summary  Global and regional left ventricular function is normal with an EF of 60-65%.  Diastolic Doppler findings (E/E' ratio and/or other parameters) suggest left ventricular filling pressures are increased.  Global right ventricular function is normal.  The inferior vena cava was normal in size with preserved respiratory  variability.  This study was compared with the study from 9/7/2023. There are no significant changes.    Outside results of note:  Outside records were obtained and relevant results/notes have been incorporated into HPI.    Assessment and Plan:     In summary, 84F with interstitial lung disease (moderate restriction), Sjogren's syndrome, HTN, atrial fibrillation, diverticulitis s/p colectomy 2011 and chronic HFpEF who presents for follow up.    Chronic HFpEF  Mainstay of HFpEF treatment includes maintaining normotension, euvolemia, sinus rhythm if possible. SGLT2 inhibitors are currently the only medications that have proven evidence with benefit against HFpEF, and have received a class 2a recommendation.  Sacubitril/valsartan and spironolactone have also had some evidence as well, receiving class 2b recommendation.  Importantly, recommendation for beta blockers have been removed as there is some evidence for harm, thus preferential to avoid if possible.  - Euvolemic on  torsemide 40 mg in AM, 20 mg in PM  - Decrease metoprolol XL to 25 mg BID  - Unable to use SGLT2i given cost    PAF  Now in sinus.  - Decrease metoprolol XL to 25 mg BID  - Not on AC given high risk of bleeding    HTN   Well controlled.  - Continue management as above    HLD  DM Type II  - Continue ASA 81 mg daily and atorvastatin 5 mg daily    Optimal Vascular Metrics    Blood Pressure   BP < 140/90 Yes    On Aspirin  Yes    On Statin  Yes    Tobacco use  No       To Do:  - Decrease metoprolol XL to 25 mg BID  - Follow up in [facility] in 6 months  - Follow up in one year with labs and echo prior    The patient states understanding and is agreeable with plan.   Feel free to contact myself regarding questions or concerns. It was a pleasure to see this patient today.    A total of 43 minutes was spent on the day of the visit, which includes preparation for the visit (reviewing previous medical records, laboratories and investigations), in conjunction with the actual clinic visit with the patient, which includes obtaining a history and physical exam, creating and reviewing the care plan, patient education (and family if present), counseling, documenting clinical information in the electronic health record and care coordination.     The longitudinal plan of care for the diagnosis(es)/condition(s) as documented were addressed during this visit. Due to the added complexity in care, I will continue to support Betty in the subsequent management and with ongoing continuity of care.     Archana Moura MD   of Medicine, [facility]  Advanced Heart Failure and Transplant Cardiology       Moe, Radha Tristan, Ilene Mahan         Please do not hesitate to contact me if you have any questions/concerns.     Sincerely,     Archana Moura MD

## 2024-09-16 DIAGNOSIS — E11.9 TYPE 2 DIABETES MELLITUS WITHOUT COMPLICATION, WITHOUT LONG-TERM CURRENT USE OF INSULIN (H): ICD-10-CM

## 2024-09-17 RX ORDER — PEN NEEDLE, DIABETIC 32GX 5/32"
NEEDLE, DISPOSABLE MISCELLANEOUS
Qty: 400 EACH | Refills: 1 | OUTPATIENT
Start: 2024-09-17

## 2024-09-18 ENCOUNTER — OFFICE VISIT (OUTPATIENT)
Dept: FAMILY MEDICINE | Facility: CLINIC | Age: 84
End: 2024-09-18
Payer: COMMERCIAL

## 2024-09-18 VITALS
WEIGHT: 181 LBS | SYSTOLIC BLOOD PRESSURE: 138 MMHG | BODY MASS INDEX: 30.16 KG/M2 | RESPIRATION RATE: 19 BRPM | DIASTOLIC BLOOD PRESSURE: 62 MMHG | HEIGHT: 65 IN | OXYGEN SATURATION: 94 % | HEART RATE: 79 BPM | TEMPERATURE: 97.9 F

## 2024-09-18 DIAGNOSIS — M81.0 AGE-RELATED OSTEOPOROSIS WITHOUT CURRENT PATHOLOGICAL FRACTURE: ICD-10-CM

## 2024-09-18 DIAGNOSIS — N95.2 ATROPHIC VAGINITIS: ICD-10-CM

## 2024-09-18 DIAGNOSIS — I10 ESSENTIAL HYPERTENSION WITH GOAL BLOOD PRESSURE LESS THAN 140/90: ICD-10-CM

## 2024-09-18 DIAGNOSIS — M26.609 TEMPOROMANDIBULAR JOINT DISORDER: ICD-10-CM

## 2024-09-18 DIAGNOSIS — N95.8 GENITOURINARY SYNDROME OF MENOPAUSE: ICD-10-CM

## 2024-09-18 DIAGNOSIS — F33.1 MAJOR DEPRESSIVE DISORDER, RECURRENT EPISODE, MODERATE (H): ICD-10-CM

## 2024-09-18 DIAGNOSIS — M10.9 GOUT, UNSPECIFIED CAUSE, UNSPECIFIED CHRONICITY, UNSPECIFIED SITE: ICD-10-CM

## 2024-09-18 DIAGNOSIS — I48.19 PERSISTENT ATRIAL FIBRILLATION (H): ICD-10-CM

## 2024-09-18 DIAGNOSIS — E11.65 TYPE 2 DIABETES MELLITUS WITH HYPERGLYCEMIA, WITH LONG-TERM CURRENT USE OF INSULIN (H): Primary | ICD-10-CM

## 2024-09-18 DIAGNOSIS — M16.11 PRIMARY OSTEOARTHRITIS OF RIGHT HIP: ICD-10-CM

## 2024-09-18 DIAGNOSIS — Z79.4 TYPE 2 DIABETES MELLITUS WITH HYPERGLYCEMIA, WITH LONG-TERM CURRENT USE OF INSULIN (H): Primary | ICD-10-CM

## 2024-09-18 DIAGNOSIS — I50.32 CHRONIC HEART FAILURE WITH PRESERVED EJECTION FRACTION (H): ICD-10-CM

## 2024-09-18 LAB
EST. AVERAGE GLUCOSE BLD GHB EST-MCNC: 252 MG/DL
HBA1C MFR BLD: 10.4 % (ref 0–5.6)

## 2024-09-18 PROCEDURE — 99214 OFFICE O/P EST MOD 30 MIN: CPT | Performed by: FAMILY MEDICINE

## 2024-09-18 PROCEDURE — 83036 HEMOGLOBIN GLYCOSYLATED A1C: CPT | Performed by: FAMILY MEDICINE

## 2024-09-18 PROCEDURE — 36415 COLL VENOUS BLD VENIPUNCTURE: CPT | Performed by: FAMILY MEDICINE

## 2024-09-18 ASSESSMENT — PATIENT HEALTH QUESTIONNAIRE - PHQ9
10. IF YOU CHECKED OFF ANY PROBLEMS, HOW DIFFICULT HAVE THESE PROBLEMS MADE IT FOR YOU TO DO YOUR WORK, TAKE CARE OF THINGS AT HOME, OR GET ALONG WITH OTHER PEOPLE: SOMEWHAT DIFFICULT
SUM OF ALL RESPONSES TO PHQ QUESTIONS 1-9: 6
SUM OF ALL RESPONSES TO PHQ QUESTIONS 1-9: 6

## 2024-09-18 ASSESSMENT — PAIN SCALES - GENERAL: PAINLEVEL: SEVERE PAIN (6)

## 2024-09-18 NOTE — PROGRESS NOTES
{PROVIDER CHARTING PREFERENCE:860916}    DM II- A1C check today, likely will be up some due to recent prednisone use for Gout flare of left wrist.     L wrist pain -Gout/Rheum- Recent gout/pseudogout in left wrist occurred after fall in mid July, so xrays done to r/o fx first, not seen. She had asked for xray to be done here in 7/24, but had rec ortho walk in clinic.  Sounds like she was seen at [facility], then Dr. El/[facility]. Eventually saw hand ortho who suspected gout, increased prednisone some, but not until prednisone was increased to 60mg/d tapering dose by rheum/Dr Lopez on 8/23/24 (and 3 days of anakinra) did she get relief.  Pt is on allopurinol 100mg/d, uric acid just prior to flare on 7/8/24 was at 4.2.     TMJ- recent pain in bilateral jaw/ear areas.  H/o TMJ in past, looks like she was seen at TMJ clinic in '12, but has been wearing mouth guard from dentist that has helped for many yrs.  Likely could benefit from new mouth guard.  Symptomatic cares rec in meantime.  Msg if TMJ referral wanted/needed.    [facility]- pt notes she's been out of estradiol cream for vaginal area for about a month- unsure how to request it as it comes from compounding pharmacy. She does think her urinary freq/urgency sx's have been worse lately.  She did see Dr. Nassar a few months ago, still on myrbetriq through her.  Will send in refills for estradiol today so she can get back on it, but rec further refills from Dr. Nassar if seeing her yearly.    MDD- mood has been okay on lexapro 20mg/d.  Had se's on wellbutrin, so only took for a few weeks this past spring- removed from med list.    Osteoporosis- MTM reviewed cares, and noted worsening t-scores on DXA despite alendronate use, likely due to continued PPI and steroid use.  Reviewed txt options with pt and had her switch from alendronate to prolia.  PTH/Vit D levels fine prior to first prolia injection on 7/8/24.  Pt thinks she tolerated the injection well.    Cardiology  follow-up reviewed- toprol XL dose decreased.    Ortho- hip replacement follow-up 1+ yr out, going well, follow-up in 5 yrs w/ Dr. Shannon.      Follow-up in ~4 months for DM II, HTN, MDD, Osteoporosis, TMJ, Gout  She is motivated to see [facility], to see if any medications could be lessened/adjusted.                Daniel Hernández is a 84 year old, presenting for the following health issues:  RECHECK (Asthma /Mdd/dm)      9/18/2024    11:19 AM   Additional Questions   Roomed by phoebe lorenz   Accompanied by jacky         9/18/2024    11:19 AM   Patient Reported Additional Medications   Patient reports taking the following new medications n/a     History of Present Illness       Diabetes:   She presents for follow up of diabetes.  She is checking home blood glucose two times daily.   She checks blood glucose before meals.  Blood glucose is sometimes over 200 and never under 70. She is aware of hypoglycemia symptoms including shakiness, dizziness, weakness and other.   She is concerned about blood sugar frequently over 200.   She is having numbness in feet, burning in feet, excessive thirst and weight gain.            Heart Failure:  She presents for follow up of heart failure. She is experiencing shortness of breath with rest and activity, which is same as usual. She is not experiencing any lower extremity edema.   She denies orthopenea and coughs at night. Patient is not checking weight daily. She has recently had a weight increase.  She has side effects from medications including dizziness and fatigue.  She has has a medical visit for heart failure 1 time since the last visit.    She eats 2-3 servings of fruits and vegetables daily.She consumes 0 sweetened beverage(s) daily.She exercises with enough effort to increase her heart rate 10 to 19 minutes per day.  She exercises with enough effort to increase her heart rate 3 or less days per week.   She is taking medications regularly.     Last Echo:   Echo  result w/o MOPS: Interpretation SummaryGlobal and regional left ventricular function is normal with an EF of 60-65%.Diastolic Doppler findings (E/E' ratio and/or other parameters) suggest leftventricular filling pressures are increased.Global right ventricular function is normal.The inferior vena cava was normal in size with preserved respiratoryvariability. This study was compared with the study from 9/7/2023. There are no significantchanges.    {Provider  Link to Heart Failure SmartSet :339203}  {MA/LPN/RN Pre-Provider Visit Orders- hCG/UA/Strep (Optional):446153}      Left wrist pain- mostly improved, but it took a long time.  Fell at wedding, going up steps, jammed hand into door.  Xray x 2 neg for fx. Warm, red and swelling.  Saw hand ortho, dx gout, Dr. Lopez confirmed it.  Did prednisone increased twice- up to 60mg/d  Back at 5mg for the past two weeks.    7/8/24 - here with prolia shot.  7/16/24 - Dr. Sanchez- 2nd xray.  8/15/24- hand clinic, more xrays, neg, though gout.  8/23/24- prednisone increased to 60mg/d.      Jaw sx's the last few days.  Wears a mouth guard at night, but still grinding.  Got a mouth guard a long time ago, may be willing to do another one.      DM II- counts are in the 200s     Urinary incontinence-  Out of estradiol for a month- urinary seems worse.  Seeing Nakib yearly- needs refill sent.    MDD- lexapro 20mg/d - will take wellbutrin off med list.      CHF/cardiology follow-up -   Recent appt w/ Dr. Moura 9/12/24-   Lowering toprol XL to 25mg 2x/day.  Follow-up [facility] in 6 months, Dr. Rosa in 1 yr.    Ortho- 5/23 surgery, doing okay, rec 5 yr follow-up     Rheum- Dr. Lopez  11/23 appt reviewed, recent gout flare, needed prednisone  RE[facility] follow-[facility] with [facility] for bone health          1/30/2024     1:11 PM 4/30/2024     1:05 PM 9/18/2024    11:12 AM   PHQ   PHQ-9 Total Score 4 6 6   Q9: Thoughts of better off dead/self-harm past 2 weeks Not at all Not at all Not at all          10/6/2023    10:52 AM 1/30/2024     1:13 PM 4/30/2024     1:06 PM   DELORES-7 SCORE   Total Score 3 (minimal anxiety) 5 (mild anxiety) 2 (minimal anxiety)   Total Score 3 5 2     {additonal problems for provider to add (Optional):396982}    {ROS Picklists (Optional):343634}      Objective    LMP  (LMP Unknown)   There is no height or weight on file to calculate BMI.  Physical Exam   {Exam List (Optional):836586}    {Diagnostic Test Results (Optional):767320}        Signed Electronically by: Ilene Tristan MD  {Email feedback regarding this note to [email or web address]   :644151}   "noted.      Objective    /62 (BP Location: Right arm, Patient Position: Sitting, Cuff Size: Adult Regular)   Pulse 79   Temp 97.9  F (36.6  C) (Temporal)   Resp 19   Ht 1.651 m (5' 5\")   Wt 82.1 kg (181 lb)   LMP  (LMP Unknown)   SpO2 94%   BMI 30.12 kg/m    Body mass index is 30.12 kg/m .  Physical Exam   GENERAL: alert and no distress  EYES: Eyes grossly normal to inspection, PERRL and conjunctivae and sclerae normal  NECK: no adenopathy, no asymmetry, masses, or scars  RESP: lungs clear to auscultation - no rales, rhonchi or wheezes  CV: regular rate and rhythm, normal S1 S2, no S3 or S4, no murmur, click or rub, no peripheral edema  MS: no gross musculoskeletal defects noted, no edema  NEURO: Normal strength and tone, mentation intact and speech normal  PSYCH: mentation appears normal, affect normal/bright      Lab on 09/12/2024   Component Date Value Ref Range Status    Sodium 09/12/2024 137  135 - 145 mmol/L Final    Potassium 09/12/2024 4.2  3.4 - 5.3 mmol/L Final    Chloride 09/12/2024 98  98 - 107 mmol/L Final    Carbon Dioxide (CO2) 09/12/2024 28  22 - 29 mmol/L Final    Anion Gap 09/12/2024 11  7 - 15 mmol/L Final    Urea Nitrogen 09/12/2024 12.6  8.0 - 23.0 mg/dL Final    Creatinine 09/12/2024 0.96 (H)  0.51 - 0.95 mg/dL Final    GFR Estimate 09/12/2024 58 (L)  >60 mL/min/1.73m2 Final    eGFR calculated using 2021 CKD-EPI equation.    Calcium 09/12/2024 9.3  8.8 - 10.4 mg/dL Final    Reference intervals for this test were updated on 7/16/2024 to reflect our healthy population more accurately. There may be differences in the flagging of prior results with similar values performed with this method. Those prior results can be interpreted in the context of the updated reference intervals.    Glucose 09/12/2024 428 (H)  70 - 99 mg/dL Final    N Terminal Pro BNP Outpatient 09/12/2024 436  0 - 1,800 pg/mL Final    Reference range shown and results flagged as abnormal are for the outpatient, non " acute settings. Establishing a baseline value for each individual patient is useful for follow-up.    Suggested inpatient cut points for confirming diagnosis of CHF in an acute setting are:  >450 pg/mL (age 18 to less than 50)  >900 pg/mL (age 50 to less than 75)  >1800 pg/mL (75 yrs and older)    An inpatient or emergency department NT-proPBNP <300 pg/mL effectively rules out acute CHF, with 99% negative predictive value.         Results for orders placed or performed in visit on 09/18/24   Hemoglobin A1c     Status: Abnormal   Result Value Ref Range    Estimated Average Glucose 252 (H) <117 mg/dL    Hemoglobin A1C 10.4 (H) 0.0 - 5.6 %    Narrative    Results confirmed by repeat test.            Signed Electronically by: Ilene Tristan MD

## 2024-09-19 ENCOUNTER — VIRTUAL VISIT (OUTPATIENT)
Dept: RHEUMATOLOGY | Facility: CLINIC | Age: 84
End: 2024-09-19
Attending: INTERNAL MEDICINE
Payer: COMMERCIAL

## 2024-09-19 VITALS — WEIGHT: 179 LBS | BODY MASS INDEX: 29.82 KG/M2 | HEIGHT: 65 IN

## 2024-09-19 DIAGNOSIS — Z79.899 LONG-TERM USE OF PLAQUENIL: ICD-10-CM

## 2024-09-19 DIAGNOSIS — M10.9 ACUTE GOUT OF FOOT, UNSPECIFIED CAUSE, UNSPECIFIED LATERALITY: Primary | ICD-10-CM

## 2024-09-19 DIAGNOSIS — M19.90 INFLAMMATORY ARTHRITIS: ICD-10-CM

## 2024-09-19 DIAGNOSIS — M35.02 SJOGREN'S SYNDROME WITH LUNG INVOLVEMENT (H): ICD-10-CM

## 2024-09-19 DIAGNOSIS — Z79.52 CURRENT CHRONIC USE OF SYSTEMIC STEROIDS: ICD-10-CM

## 2024-09-19 PROCEDURE — G2211 COMPLEX E/M VISIT ADD ON: HCPCS | Performed by: INTERNAL MEDICINE

## 2024-09-19 PROCEDURE — 99214 OFFICE O/P EST MOD 30 MIN: CPT | Mod: 95 | Performed by: INTERNAL MEDICINE

## 2024-09-19 ASSESSMENT — PAIN SCALES - GENERAL: PAINLEVEL: SEVERE PAIN (6)

## 2024-09-19 NOTE — PATIENT INSTRUCTIONS
Start anakinra daily shots and use it till pain goes away    Let me know if you want L wrist MRI to be done    Return in about 3 months (video)

## 2024-09-19 NOTE — LETTER
2024       RE: Betty Tee  [address]  [place] [address]     Dear Colleague,    Thank you for referring your patient, Betty Tee, to the [facility] RHEUMATOLOGY [facility] [place] [facility]. Please see a copy of my visit note below.    Virtual Visit Details    Type of service:  Video Visit     Joined the call at 2024, 3:14:23 pm.  Left the call at 2024, 3:19:44 pm.    Originating Location (pt. Location): Home  Distant Location (provider location):  Off-site  Platform used for Video Visit: [facility]      Rheumatology Clinic In Person Urgent Visit Note  Zulma Lopez MD  DOS:2024  Date of last visit: 2024    Name: Betty Tee  MRN: [id number]  Age: 84 year old  : 1940  Reason for visit: Follow up visit for PMR, presumed gout, primary Sjogren's syndrome    # Sjogren's syndrome since  with borderline +RG, +SSA, and lip biopsy focus score of 1. Ongoing dry mouth on evoxac qod  # Follicular bronchiolitis, prior mild ILD   # Osteopenia on DEXA 2019 with T score=-2.1 with decline in bone density on fosamax  # Chronic prednisone use  # DM  # A fib  # Severe R hip OA  # PMR stable on prednisone 5 mg every day  #Presumed gout flare, recovered  #ESOB      Assessment and Plan:    New Dx of PMR. Severe R hip pain is due to severe OA based on 2020 hip MRI. She prefered to avoid hip arthroplasty in the past but it is quite bothersome and would like to see ortho. Her inflammation markers improved on prednisone, PMR responded to prednisone, now is 5 mg qd.     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 Completed pulmonary rehab in 2019 where she was able to exercise without oxygen. GFR stable.    On HCQ since 2019 with significant improvement of joint pain. No retinal toxicity on eye  exam done 1/2021. Tolerates HCQ well.     Sister Betty recovered from flare of presumed gout (or pseudogout given previous nl SUA) over L foot. She responded to high dose prednisone (40-30-20-10 mg every day each for 3 days) in the past, now is on 5 mg every day.  Given her DM, osteopenia, highly recommend to start using anakinra prn for gout flares, no flares and has not required it yet.     She preferred in the past not to start daily urate-lowering therapy and her uric acid has been ~6 so allopurinol deferred.     7/8/2022: Sister Betty's major complaint at last visit was ESOB which is improved after Tx of B12 deficiency, iron deficiency due to GIB. 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 Her major complaint today is severe R hip pain due to OA which eventually requires R hip replacement, but she needs to be medically clear for surgery. Discussed pain mx and advised follow up with ortho.      1/13/2023: Stable, no active gout today. B12/iron deficiency anemia has improved. On allopurinol 50 mg po every day.    7/21/2023: On increased allopurinol 100 mg every day since 1/2023, severe polyarticular gout flare last week requiring ER visit and prednisone 40-30-20-10 mg every day, each x 3 days then 5 mg a day, as anakinra daily inj was not enough to control it.    Recovered from gout flare, discussed ongoing m/o gout.    Plan:    Prednisone 5 mg a day    Norco as needed for pain      Cevimeline for dry mouth could be taken 3 times a day (she takes it every other day as does not like to take so many pills)    Stay on fosamax weekly    Yearly eye exam on HCQ    Plaquenil 1 tab a day    For gout flares: take anakinra daily shots x 5-7 days, if not enough, take the prednisone as 40-30-20-10 mg a day, each course x 3 days then go back to 5 mg a day    Return in 6 months (in person)    Labs today    Allopurinol 100 mg a day    Return in person in 6 months    11/3/2023:    Recent gout flare,  affecting multiple joints, responded to anakinra well. Will continue anakinra prn. Will re-check SUA with next blood draw and adjust allopurinol as needed. Will refer to endo to address bone health, no improvement of fosamax.    Plan:    Refer to Yvonne Hunt endocrinologist for osteoporosis management    Labs in 1/2024    Keep 1/2024 appointment with me      2/28/2024:    Doing so well, no recent gout flare to use anakinra or prednisone. Significant improvement of inflammation markers.    Plan:    Prednisone 5 mg a day    Norco as needed for pain      Cevimeline for dry mouth could be taken 3 times a day (she takes it every other day as does not like to take so many pills)    Stay on fosamax weekly, upcoming endocrine appointment to address bone health in 5/2024    Yearly eye exam on HCQ    Plaquenil 1 tab a day    Allopurinol 100 mg every day    Labs q6-12 months, reviewed/stable    For gout flares: take anakinra daily shots x 5-7 days, if not enough, take the prednisone as 40-30-20-10 mg a day, each course x 3 days then go back to 5 mg a day        Return in 6 months in person      8/23/2024:    Urgent visit for L wrist erythema/pain/mild swelling after a fall in July, improved by taking prednisone 40 mg every day taper+anakinra x 3 doses (finished last wk) but not resolved, very painful, worried about participating in PT. Wishes not to get local steroid inj by ortho.    Based on presentation, seems like pseudogout (more likely)/gout flare of l wrist triggered by trauma.    Discussed plan of care. Recommend not to do PT till inflammation settles down.    Plan:    Percocet as needed      Plan:      Prednisone 60-40-20-10 mg a day, each course x 3 days then go back on 5 mg a day    Go back on anakinra inj daily x 3 more days    Watch the blood sugar on prednisone    Keep September appointment    Today 9/19/2024:    L wrist flare has subsided, but still has residual pain, she considers getting a MRI done. Now her R  foot hurting, no imaging. Could be start of gout flare. Was advised to use anakinra.    Plan 9/19/2024:    Will continue with HCQ, yearly eye exam, prednisone 5 mg every day.    Start anakinra daily shots and use it till pain goes away    Let me know if you want L wrist MRI to be done    Return in about 3 months (video)      The longitudinal plan of care for the diagnosis(es)/condition(s) as documented were addressed during this visit. Due to the added complexity in care, I will continue to support Betty in the subsequent management and with ongoing continuity of care.      Zulma Lopez MD        HPI:   Betty Tee is a 81 year old WF with a history of primary Sjogren's syndrome, PMR, OA who presents for follow-up. She was diagnosed with Sjogren's syndrome in 2015 with borderline +RG, +SSA, and lip biopsy focus score of 1. Associated ILD and joint pain are prednisone-responsive which support the diagnosis.     10/1/2021: No gout flares since last visit so has not needed anakinra. She continues to have R hip pain related to severe OA but does not want to pursue surgery. She recently started pool therapy and began taking CBD oil yesterday. Is hoping to pursue medical marijuana in the future as she does not want to be dependent on norco. Aside from hip pain has otherwise been doing well.        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 She is on prednisone 5 mg a day, she was given prednisone taper recently for possible L foot gout flare which helped. Did not flare again to need using anakinra shots. She has severe R hip pain. Saw ortho, had R hip MRI on 9/5/2020 which showed severe OA, R hip arthroplasty was recommended. She would prefer to delay R hip arthroplasty, had R hip inj on 2/24, NSAIDs are not allowed with CKD. Norco helps but she does not like to be dependent on it, takes it rarely. No L foot pain today. Her hip/leg pain/back pain is getting better, going  to PT, doing exercises at home, last time elbow massage caused pain. Epidural shot helped. Has dry mouth. SOB is stable at baseline.  Last 3 wk has been hard, her doctor took her off gabapentin, sweat/chills and loss of appetite. Reason was being on other meds. Now off gabapentin. Had several gushing bowel explosion, could not make it to the bathroom. Random, unpredictable. No triggers. Has had this problem for years.     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    No gout flares, has not required anakinra, it is in the fridge.    Has breathing issue, ESOB is worse. O2 level is good. Saw PCP, was recomemnded to do follow up with cardiology.    Getting R hip steroid inj, last one was for bursitis. Still hurts crissy today. Saw pain mx yesterday. Got minimal exercises to help moving.    7/8/2022:    Sister Betty presents for follow up. At last visit,w as found to be anemic which explained her SOB. She had iron deficiency and B12 deficiency. Was found to have GIB. Had multiple GI visits, EGD, was treated with cauterization x 3  for GAVE. This AM, had brown stool not black.    SOB is improved.    No gout flares since last visit.    Her major complaint is severe R hip pain, not responding to current pain mx, considers hip arthroplasty, but was told to wait till anemia gets fixed, also has to figure out her heart condition.      1/13/2023:     No gout flare today  Anakinra prn helps with flares  SOB is better  Hip OA pain is the same, considers arthroplasty.      7/21/2023:    Reviewed chart, labs, recent events.    -s/p R hip arthroplasty, recovered well  -Severe polyarticular gout flare last week requiring ER visit and prednisone 40-30-20-10 mg every day, each x 3 days then 5 mg a day, as anakinra qd inj was not enough to control it.  -better now    11/3/2023: Doing better than last visit, still flares with gout but not as bad, anakinra helps.    2/28/2024: Doing so well, no gout flares, no  complaints.    8/23/2024:    Flare, severe L wrist pain/swelling after fall in July. No fracture. Prednisone+anakinra helped with decreased swelling, pain is severe.      Today 9/19/2024:    -L writ swelling/pain is much better after anakinra, prednisone taper, but still has residual pain    - r foot just started to hurt    Review of Systems:   A comprehensive ROS was done. Positives are per HPI.      Active Medications:     Outpatient Medications Prior to Visit   Medication Sig Dispense Refill     acyclovir (ZOVIRAX) 400 MG tablet Take 1 tablet (400 mg) by mouth every 8 hours For a couple days 15 tablet 2     acyclovir (ZOVIRAX) 5 % external ointment Apply topically as needed (6 times day PRN for outbreaks) As needed for outbreaks 15 g 3     albuterol (PROAIR HFA/PROVENTIL HFA/VENTOLIN HFA) 108 (90 Base) MCG/ACT inhaler Inhale 2 puffs into the lungs every 6 hours as needed for wheezing or shortness of breath       allopurinol (ZYLOPRIM) 100 MG tablet TAKE 1 TABLET BY MOUTH EVERY DAY 90 tablet 1     anakinra (KINERET) 100 MG/0.67ML SOSY injection Inject 0.67 mLs (100 mg) Subcutaneous daily 20.1 mL 3     aspirin (ASA) 81 MG EC tablet Take 1 tablet (81 mg) by mouth 2 times daily 60 tablet 0     atorvastatin (LIPITOR) 10 MG tablet Take 0.5 tablets (5 mg) by mouth daily 45 tablet 2     azithromycin (ZITHROMAX) 250 MG tablet TAKE 1 TABLET BY MOUTH EVERY DAY 30 tablet 11     blood glucose (ACCU-CHEK SHANNON PLUS) test strip USE TO TEST BLOOD SUGARS 3 TIMES DAILY 300 strip 1     blood glucose (NO BRAND SPECIFIED) lancets standard Use to test blood sugar 3 times daily or as directed 100 Lancet 3     BREO ELLIPTA 100-25 MCG/ACT inhaler TAKE 1 PUFF BY MOUTH EVERY DAY 1 each 11     cevimeline (EVOXAC) 30 MG capsule Take 1 capsule (30 mg) by mouth 3 times daily as needed 270 capsule 3     Cholecalciferol (VITAMIN D3) 50 MCG (2000 UT) CAPS TAKE 100 MCG BY MOUTH DAILY (TAKE 2 TABLET (50 MCG) BY MOUTH DAILY - ORAL) 180 capsule 2      COMPOUNDED NON-CONTROLLED SUBSTANCE (CMPD RX) - PHARMACY TO MIX COMPOUNDED MEDICATION Estriol 1 mg/g Apply small amount to finger and apply to inside vagina daily for 2 weeks then twice weekly Route: vaginally Dispense 30 grams 11 refills 30 g 11     cyanocobalamin (VITAMIN B-12) 1000 MCG tablet Take 1 tablet (1,000 mcg) by mouth three times a week       escitalopram (LEXAPRO) 20 MG tablet Take 1 tablet (20 mg) by mouth daily 90 tablet 1     fluticasone (FLONASE) 50 MCG/ACT nasal spray SPRAY 1-2 SPRAYS INTO BOTH NOSTRILS DAILY AS NEEDED FOR ALLERGIES 16 mL 11     hydroxychloroquine (PLAQUENIL) 200 MG tablet TAKE 1 TABLET (200 MG) BY MOUTH DAILY GET ANNUAL EYE EXAMS FOR MONITORING AND FAX [phone] 90 tablet 0     insulin glargine (LANTUS SOLOSTAR) 100 UNIT/ML pen INJECT 15 UNITS SUBCUTANEOUS AT BEDTIME 30 mL 0     insulin lispro (HUMALOG KWIKPEN) 100 UNIT/ML (1 unit dial) KWIKPEN Inject Subcutaneous 3 times daily (before meals) Sliding scale insulin; tests BG three times day  If BG <150, no insulin given   If -200 take 2 units  If -250 take 4 units  If -300 take 6 units  If -350 take 10 units  If BG >350 take 14 units       insulin pen needle (BD PEN NEEDLE JANE 2ND GEN) 32G X 4 MM miscellaneous USE TO TEST 4 TIMES A  each 1     ketoconazole (NIZORAL) 2 % external cream Apply topically 2 times daily as needed for itching 60 g 1     levocetirizine (XYZAL) 5 MG tablet TAKE 1 TABLET BY MOUTH EVERY DAY IN THE EVENING 90 tablet 1     lidocaine (LIDODERM) 5 % patch APPLY PATCH TO PAINFUL AREA FOR UP TO 12 H WITHIN A 24 H PERIOD. REMOVE AFTER 12 HOURS. (Patient taking differently: Apply patch to painful area for up to 12 h within a 24 h period.  Remove after 12 hours.) 30 patch 2     loratadine (CLARITIN) 10 MG tablet TAKE 1 TABLET BY MOUTH EVERY DAY 90 tablet 3     methocarbamol (ROBAXIN) 750 MG tablet Take 1 tablet (750 mg) by mouth 3 times daily as needed for muscle spasms 90 tablet  3     metoprolol succinate ER (TOPROL XL) 25 MG 24 hr tablet Take 1 tablet (25 mg) by mouth 2 times daily. 180 tablet 3     mirabegron (MYRBETRIQ) 25 MG 24 hr tablet Take 1 tablet (25 mg) by mouth daily 30 tablet 11     montelukast (SINGULAIR) 10 MG tablet TAKE 1 TABLET BY MOUTH EVERYDAY AT BEDTIME 90 tablet 3     oxyCODONE-acetaminophen (PERCOCET) 5-325 MG tablet Take 1 tablet by mouth every 8 hours as needed for pain. 60 tablet 0     pantoprazole (PROTONIX) 40 MG EC tablet Take 1 tablet (40 mg) by mouth daily (replaces famotidine- should stop taking famotidine) 90 tablet 3     polyethylene glycol (MIRALAX) 17 g packet Take 17 g by mouth daily 10 packet 0     potassium chloride ER (KLOR-CON) 20 MEQ CR tablet Take 2 tablets (40 mEq) by mouth every morning AND 1 tablet (20 mEq) every evening. 270 tablet 3     predniSONE (DELTASONE) 10 MG tablet For gout flares, take the prednisone as 40-30-20-10 mg a day, each course x 3 days then go back to 5 mg a day 30 tablet 3     predniSONE (DELTASONE) 20 MG tablet 60-40-20-10 mg a day, each course x 3 days then go back on 5 mg a day 25 tablet 0     predniSONE (DELTASONE) 5 MG tablet Take 1 tablet (5 mg) by mouth daily 90 tablet 1     Semaglutide, 2 MG/DOSE, (OZEMPIC) 8 MG/3ML pen Inject 2 mg Subcutaneous every 7 days 9 mL 3     torsemide (DEMADEX) 20 MG tablet TAKE 2 TABLETS (40 MG) BY MOUTH EVERY MORNING AND 1 TABLET (20 MG) DAILY AT 2 PM. TAKE THE AFTERNOON DOSE WITH AN EXTRA 10 MG TAB FOR A TOTAL OF 30 MG IN THE AFTERNOON 270 tablet 3     traZODone (DESYREL) 50 MG tablet Take 3 tablets (150 mg) by mouth at bedtime 90 tablet 9     Facility-Administered Medications Prior to Visit   Medication Dose Route Frequency Provider Last Rate Last Admin     [START ON 12/10/2024] denosumab (PROLIA) injection 60 mg  60 mg Subcutaneous Q6 Months          Allergies:   Allergies   Allergen Reactions     Amoxicillin-Pot Clavulanate Nausea and Vomiting     Amoxicillin-Pot Clavulanate       Codeine Nausea and Vomiting     PN: LW Reaction: HIVES     Penicillins Nausea and Vomiting     PN: LW Reaction: GI Upset     Phenobarbital Itching     Seasonal Allergies         Past Medical History:  Alcohol abuse, in remission.   Allergic rhinitis.   Antiplatelet long-term use.   Atrial fibrillation.   Cardiomegaly.   Osteopenia.   Diverticulosis.   Benign essential hypertension.   GERD.   Insomnia.   Irregular heart beat.   Lumbago.   Major depressive disorder, recurrent episode, moderate.   ANGELICA.  Osteoarthrosis.   Sjogren's syndrome.   Sleep apnea.   Tobacco use disorder.   TMJ disorder.   RLS.  Major depressive disorder.   Hypertension.   Sciatica.   Colouterine fistula.   Left ventricular hypertrophy.   Breat fibroadenoma.   DVT.   Fatty liver disease, nonalcoholic.   Rib pain.   Neck mass.   Interstitial lung disease.   Acute and chronic respiratory failure with hypoxia.   Type II diabetes mellitus.   Hyperlipidemia.   Inflammatory arthritis.      Past Surgical History:  Back surgery.   Left breast biopsy.   Appendectomy.   Exploratory laparotomy.   Cardiac surgery.   Cholecystectomy.   Left colectomy.   Total abdominal hysterectomy with bilateral salpingo-oophorectomy.   Insert ureter stent.   Flexible sigmoidoscopy.   Ileostomy takedown.     Family History:   Mother: Positive for CAD, heart disease, hypertension, cerebrovascular disease, hyperlipidemia.   Father: Positive for alcohol/drug abuse, Alzheimer disease, dementia, hypertension, hyperlipidemia.   Sister: Positive for CAD, hypertension, and colorectal cancer.   Sister: Positive for hypertension and hyperlipidemia.   Sister: Positive for diabetes, lung cancer, asthma, and heart disease.   Brother: Positive for Parkinsonism and substance abuse.   Brother: Positive for hypertension, diverticulitis, and prostate cancer.   Daughter: Positive for breast cancer.        Social History:   She is a former smoker; quit in 2011. She has a history of alcohol use  "but stopped drinking in 1986.      Physical Exam:     Ht 1.651 m (5' 5\")   Wt 81.2 kg (179 lb)   LMP  (LMP Unknown)   BMI 29.79 kg/m      Constitutional: NAD, very pleasant, sister Nghia present   Eyes: Normal EOM, conjunctiva, sclera  Chest: CTAB  CV: no M/R/G, RRR  Abdomen: soft, NT  MSK: erythema/warmth/severe tenderness with minimal effusion of L wrist  Skin: No rash  Neuro: grossly non-focal  Psych: Normal affect.    Images:  CT Chest w/o contrast (7/25/18):  Findings remain most consistent with small airway disease  and/or nonclassifiable interstitial lung disease and are not  significantly changed from the March 2017 comparison.    Per radiology.    Laboratory:       Latest Ref Rng & Units 4/25/2024     4:35 PM 7/8/2024     1:29 PM 9/12/2024    11:38 AM   RHEUM RESULTS   ALT 0 - 50 U/L  12     AST 0 - 45 U/L  20     Creatinine 0.51 - 0.95 mg/dL 1.02  0.97  0.96    CRP Inflammation <5.00 mg/L  19.00     GFR Estimate >60 mL/min/1.73m2 54  57  58    Hematocrit 35.0 - 47.0 %  40.9     Hemoglobin 11.7 - 15.7 g/dL  12.9     WBC 4.0 - 11.0 10e3/uL  9.4     RBC Count 3.80 - 5.20 10e6/uL  4.41     RDW 10.0 - 15.0 %  13.9     MCHC 31.5 - 36.5 g/dL  31.5     MCV 78 - 100 fL  93     Platelet Count 150 - 450 10e3/uL  205         Rheumatoid Factor   Date Value Ref Range Status   07/24/2015 <20 <20 IU/mL Final   ,  ,   Cyclic Cit Pept IgG/IgA   Date Value Ref Range Status   07/24/2015 <20  Interpretation:  Negative   <20 UNITS Final   ,  ,   Scleroderma Antibody Scl-70 CECILIA IgG   Date Value Ref Range Status   07/24/2015  0.0 - 0.9 AI Final    <0.2  Negative   Antibody index (AI) values reflect qualitative changes in antibody   concentration that cannot be directly associated with clinical condition or   disease state.       SSA (Ro) (CECILIA) Antibody, IgG   Date Value Ref Range Status   07/24/2015 1.6 (H) 0.0 - 0.9 AI Final     Comment:     Positive   Antibody index (AI) values reflect qualitative changes in antibody   " concentration that cannot be directly associated with clinical condition or   disease state.       SSB (La) (CECILIA) Antibody, IgG   Date Value Ref Range Status   07/24/2015  0.0 - 0.9 AI Final    <0.2  Negative   Antibody index (AI) values reflect qualitative changes in antibody   concentration that cannot be directly associated with clinical condition or   disease state.       ,  ,   RG Screen by EIA   Date Value Ref Range Status   07/24/2015 <1.0  Interpretation:  Negative   <1.0 Final   ,  ,  ,  ,  ,  ,  ,  ,  ,  ,  ,  ,  ,  ,  ,  ,  ,  ,   Neutrophil Cytoplasmic IgG Antibody   Date Value Ref Range Status   07/24/2015   Final    <1:20  Reference range: <1:20  (Note)  The ANCA IFA is <1:20; therefore, no further testing will  be performed.  INTERPRETIVE INFORMATION: Anti-Neutrophil Cyto Ab, IgG  Neutrophil Cytoplasmic Antibodies (C-ANCA = granular  cytoplasmic staining, P-ANCA = perinuclear staining) are  found in the serum of over 90 percent of patients with  certain necrotizing systemic vasculitides, and usually in  less than 5 percent of patients with collagen vascular  disease or arthritis.  Performed by [employer],  [address] [place] [id number] [phone]  [email or web address], Burke Mckeon MD, Lab. Director       ,  ,  ,  ,  ,  ,  ,   IGG   Date Value Ref Range Status   07/24/2015 1320 695 - 1620 mg/dL Final   ,  ,  ,  ,  ,   Scleroderma Antibody Scl-70 CECILIA IgG   Date Value Ref Range Status   07/24/2015  0.0 - 0.9 AI Final    <0.2  Negative   Antibody index (AI) values reflect qualitative changes in antibody   concentration that cannot be directly associated with clinical condition or   disease state.          CBC RESULTS: 6/4/2021   Lab Test 06/04/21  1422   WBC 8.6   RBC 4.46   HGB 13.4   HCT 42.1   MCV 94   MCH 30.0   MCHC 31.8   RDW 15.5*        Last Comprehensive Metabolic Panel:  Sodium   Date Value Ref Range Status   09/12/2024 137 135 - 145 mmol/L Final   06/04/2021 137 133 - 144  mmol/L Final     Potassium   Date Value Ref Range Status   09/12/2024 4.2 3.4 - 5.3 mmol/L Final   08/23/2022 4.1 3.4 - 5.3 mmol/L Final   06/04/2021 4.0 3.4 - 5.3 mmol/L Final     Chloride   Date Value Ref Range Status   09/12/2024 98 98 - 107 mmol/L Final   08/23/2022 107 94 - 109 mmol/L Final   06/04/2021 106 94 - 109 mmol/L Final     Carbon Dioxide   Date Value Ref Range Status   06/04/2021 25 20 - 32 mmol/L Final     Carbon Dioxide (CO2)   Date Value Ref Range Status   09/12/2024 28 22 - 29 mmol/L Final   08/23/2022 22 20 - 32 mmol/L Final     Anion Gap   Date Value Ref Range Status   09/12/2024 11 7 - 15 mmol/L Final   08/23/2022 8 3 - 14 mmol/L Final   06/04/2021 6 3 - 14 mmol/L Final     Glucose   Date Value Ref Range Status   09/12/2024 428 (H) 70 - 99 mg/dL Final   08/23/2022 198 (H) 70 - 99 mg/dL Final   06/04/2021 142 (H) 70 - 99 mg/dL Final     GLUCOSE BY METER POCT   Date Value Ref Range Status   06/05/2023 259 (H) 70 - 99 mg/dL Final     Urea Nitrogen   Date Value Ref Range Status   09/12/2024 12.6 8.0 - 23.0 mg/dL Final   08/23/2022 17 7 - 30 mg/dL Final   06/04/2021 14 7 - 30 mg/dL Final     Creatinine   Date Value Ref Range Status   09/12/2024 0.96 (H) 0.51 - 0.95 mg/dL Final   06/04/2021 1.11 (H) 0.52 - 1.04 mg/dL Final     GFR Estimate   Date Value Ref Range Status   09/12/2024 58 (L) >60 mL/min/1.73m2 Final     Comment:     eGFR calculated using 2021 CKD-EPI equation.   06/04/2021 47 (L) >60 mL/min/[1.73_m2] Final     Comment:     Non  GFR Calc  Starting 12/18/2018, serum creatinine based estimated GFR (eGFR) will be   calculated using the Chronic Kidney Disease Epidemiology Collaboration   (CKD-EPI) equation.       Calcium   Date Value Ref Range Status   09/12/2024 9.3 8.8 - 10.4 mg/dL Final     Comment:     Reference intervals for this test were updated on 7/16/2024 to reflect our healthy population more accurately. There may be differences in the flagging of prior results with  similar values performed with this method. Those prior results can be interpreted in the context of the updated reference intervals.   06/04/2021 8.8 8.5 - 10.1 mg/dL Final       ESR 6/4/2021: 10.0    CRP 6/4/2021: 3.0    Uric acid 6/4/2021: 6.2    Component      Latest Ref Rng & Units 2/11/2022   WBC      4.0 - 11.0 10e3/uL 11.5 (H)   RBC Count      3.80 - 5.20 10e6/uL 3.47 (L)   Hemoglobin      11.7 - 15.7 g/dL 9.1 (L)   Hematocrit      35.0 - 47.0 % 30.2 (L)   MCV      78 - 100 fL 87   MCH      26.5 - 33.0 pg 26.2 (L)   MCHC      31.5 - 36.5 g/dL 30.1 (L)   RDW      10.0 - 15.0 % 15.1 (H)   Platelet Count      150 - 450 10e3/uL 283   % Neutrophils      % 76   % Lymphocytes      % 9   % Monocytes      % 11   % Eosinophils      % 2   % Basophils      % 1   % Immature Granulocytes      % 1   NRBCs per 100 WBC      <1 /100 0   Absolute Neutrophils      1.6 - 8.3 10e3/uL 8.9 (H)   Absolute Lymphocytes      0.8 - 5.3 10e3/uL 1.0   Absolute Monocytes      0.0 - 1.3 10e3/uL 1.2   Absolute Eosinophils      0.0 - 0.7 10e3/uL 0.2   Absolute Basophils      0.0 - 0.2 10e3/uL 0.1   Absolute Immature Granulocytes      <=0.4 10e3/uL 0.1   Absolute NRBCs      10e3/uL 0.0   Albumin Fraction      3.7 - 5.1 g/dL 3.9   Alpha 1 Fraction      0.2 - 0.4 g/dL 0.4   Alpha 2 Fraction      0.5 - 0.9 g/dL 0.8   Beta Fraction      0.6 - 1.0 g/dL 1.0   Gamma Fraction      0.7 - 1.6 g/dL 0.8   Monoclonal Peak      <=0.0 g/dL 0.0   ELP Interpretation:       Essentially normal electrophoretic pattern. No obvious monoclonal proteins seen. Pathologic significance requires clinical correlation. Meghan Brothers M.D., Ph.D.   Iron      35 - 180 ug/dL 17 (L)   Iron Binding Cap      240 - 430 ug/dL 354   Iron Saturation Index      15 - 46 % 5 (L)   Creatinine      0.52 - 1.04 mg/dL 1.00   GFR Estimate      >60 mL/min/1.73m2 56 (L)   % Retic      0.5 - 2.0 % 2.7 (H)   Absolute Retic      0.025 - 0.095 10e6/uL 0.090   ALT      0 - 50 U/L 17   Albumin      3.4  - 5.0 g/dL 3.3 (L)   AST      0 - 45 U/L 15   Sed Rate      0 - 30 mm/hr 36 (H)   CRP Inflammation      0.0 - 8.0 mg/L 12.5 (H)   Uric Acid      2.6 - 6.0 mg/dL 6.1 (H)   Immunofixation ELP       No monoclonal protein seen on immunofixation. Pathologic significance requires clinical correlation.  Meghan Brothers M.D., Ph.D.   Immunofix ELP Urine       No monoclonal protein seen on immunofixation. Pathologic significance requires clinical correlation.  Meghan Brothers M.D., Ph.D.   Total Protein Serum for ELP      6.8 - 8.8 g/dL 6.9   N-Terminal Pro Bnp      0 - 450 pg/mL 239   Haptoglobin      32 - 197 mg/dL 149   Vitamin B12      193 - 986 pg/mL 162 (L)   Ferritin      8 - 252 ng/mL 14   Again, thank you for allowing me to participate in the care of your patient.      Sincerely,    Zulma Lopez MD

## 2024-09-19 NOTE — PROGRESS NOTES
Virtual Visit Details    Type of service:  Video Visit     Joined the call at 2024, 3:14:23 pm.  Left the call at 2024, 3:19:44 pm.    Originating Location (pt. Location): Home  Distant Location (provider location):  Off-site  Platform used for Video Visit: [facility]      Rheumatology Clinic In Person Urgent Visit Note  Zulma Lopez MD  DOS:2024  Date of last visit: 2024    Name: Betty Tee  MRN: [id number]  Age: 84 year old  : 1940  Reason for visit: Follow up visit for PMR, presumed gout, primary Sjogren's syndrome    # Sjogren's syndrome since  with borderline +RG, +SSA, and lip biopsy focus score of 1. Ongoing dry mouth on evoxac qod  # Follicular bronchiolitis, prior mild ILD   # Osteopenia on DEXA 2019 with T score=-2.1 with decline in bone density on fosamax  # Chronic prednisone use  # DM  # A fib  # Severe R hip OA  # PMR stable on prednisone 5 mg every day  #Presumed gout flare, recovered  #ESOB      Assessment and Plan:    New Dx of PMR. Severe R hip pain is due to severe OA based on 2020 hip MRI. She prefered to avoid hip arthroplasty in the past but it is quite bothersome and would like to see ortho. Her inflammation markers improved on prednisone, PMR responded to prednisone, now is 5 mg qd.     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 Completed pulmonary rehab in 2019 where she was able to exercise without oxygen. GFR stable.    On HCQ since 2019 with significant improvement of joint pain. No retinal toxicity on eye exam done 2021. Tolerates HCQ well.     Sister Betty recovered from flare of presumed gout (or pseudogout given previous nl SUA) over L foot. She responded to high dose prednisone (40-30-20-10 mg every day each for 3 days) in the past, now is on 5 mg every day.  Given her DM, osteopenia, highly recommend to start using  anakinra prn for gout flares, no flares and has not required it yet.     She preferred in the past not to start daily urate-lowering therapy and her uric acid has been ~6 so allopurinol deferred.     7/8/2022: Sister Betty's major complaint at last visit was ESOB which is improved after Tx of B12 deficiency, iron deficiency due to GIB. 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 Her major complaint today is severe R hip pain due to OA which eventually requires R hip replacement, but she needs to be medically clear for surgery. Discussed pain mx and advised follow up with ortho.      1/13/2023: Stable, no active gout today. B12/iron deficiency anemia has improved. On allopurinol 50 mg po every day.    7/21/2023: On increased allopurinol 100 mg every day since 1/2023, severe polyarticular gout flare last week requiring ER visit and prednisone 40-30-20-10 mg every day, each x 3 days then 5 mg a day, as anakinra daily inj was not enough to control it.    Recovered from gout flare, discussed ongoing m/o gout.    Plan:    Prednisone 5 mg a day    Norco as needed for pain      Cevimeline for dry mouth could be taken 3 times a day (she takes it every other day as does not like to take so many pills)    Stay on fosamax weekly    Yearly eye exam on HCQ    Plaquenil 1 tab a day    For gout flares: take anakinra daily shots x 5-7 days, if not enough, take the prednisone as 40-30-20-10 mg a day, each course x 3 days then go back to 5 mg a day    Return in 6 months (in person)    Labs today    Allopurinol 100 mg a day    Return in person in 6 months    11/3/2023:    Recent gout flare, affecting multiple joints, responded to anakinra well. Will continue anakinra prn. Will re-check SUA with next blood draw and adjust allopurinol as needed. Will refer to endo to address bone health, no improvement of fosamax.    Plan:    Refer to Yvonne Hunt endocrinologist for osteoporosis management    Labs in 1/2024    Keep  1/2024 appointment with me      2/28/2024:    Doing so well, no recent gout flare to use anakinra or prednisone. Significant improvement of inflammation markers.    Plan:    Prednisone 5 mg a day    Norco as needed for pain      Cevimeline for dry mouth could be taken 3 times a day (she takes it every other day as does not like to take so many pills)    Stay on fosamax weekly, upcoming endocrine appointment to address bone health in 5/2024    Yearly eye exam on HCQ    Plaquenil 1 tab a day    Allopurinol 100 mg every day    Labs q6-12 months, reviewed/stable    For gout flares: take anakinra daily shots x 5-7 days, if not enough, take the prednisone as 40-30-20-10 mg a day, each course x 3 days then go back to 5 mg a day        Return in 6 months in person      8/23/2024:    Urgent visit for L wrist erythema/pain/mild swelling after a fall in July, improved by taking prednisone 40 mg every day taper+anakinra x 3 doses (finished last wk) but not resolved, very painful, worried about participating in PT. Wishes not to get local steroid inj by ortho.    Based on presentation, seems like pseudogout (more likely)/gout flare of l wrist triggered by trauma.    Discussed plan of care. Recommend not to do PT till inflammation settles down.    Plan:    Percocet as needed      Plan:      Prednisone 60-40-20-10 mg a day, each course x 3 days then go back on 5 mg a day    Go back on anakinra inj daily x 3 more days    Watch the blood sugar on prednisone    Keep September appointment    Today 9/19/2024:    L wrist flare has subsided, but still has residual pain, she considers getting a MRI done. Now her R foot hurting, no imaging. Could be start of gout flare. Was advised to use anakinra.    Plan 9/19/2024:    Will continue with HCQ, yearly eye exam, prednisone 5 mg every day.    Start anakinra daily shots and use it till pain goes away    Let me know if you want L wrist MRI to be done    Return in about 3 months (video)      The  longitudinal plan of care for the diagnosis(es)/condition(s) as documented were addressed during this visit. Due to the added complexity in care, I will continue to support Betty in the subsequent management and with ongoing continuity of care.      Zulma Lopez MD        HPI:   Betty Tee is a 81 year old WF with a history of primary Sjogren's syndrome, PMR, OA who presents for follow-up. She was diagnosed with Sjogren's syndrome in 2015 with borderline +RG, +SSA, and lip biopsy focus score of 1. Associated ILD and joint pain are prednisone-responsive which support the diagnosis.     10/1/2021: No gout flares since last visit so has not needed anakinra. She continues to have R hip pain related to severe OA but does not want to pursue surgery. She recently started pool therapy and began taking CBD oil yesterday. Is hoping to pursue medical marijuana in the future as she does not want to be dependent on norco. Aside from hip pain has otherwise been doing well.        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 She is on prednisone 5 mg a day, she was given prednisone taper recently for possible L foot gout flare which helped. Did not flare again to need using anakinra shots. She has severe R hip pain. Saw ortho, had R hip MRI on 9/5/2020 which showed severe OA, R hip arthroplasty was recommended. She would prefer to delay R hip arthroplasty, had R hip inj on 2/24, NSAIDs are not allowed with CKD. Norco helps but she does not like to be dependent on it, takes it rarely. No L foot pain today. Her hip/leg pain/back pain is getting better, going to PT, doing exercises at home, last time elbow massage caused pain. Epidural shot helped. Has dry mouth. SOB is stable at baseline.  Last 3 wk has been hard, her doctor took her off gabapentin, sweat/chills and loss of appetite. Reason was being on other meds. Now off gabapentin. Had several gushing bowel explosion, could not  make it to the bathroom. Random, unpredictable. No triggers. Has had this problem for years.     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    No gout flares, has not required anakinra, it is in the fridge.    Has breathing issue, ESOB is worse. O2 level is good. Saw PCP, was recomemnded to do follow up with cardiology.    Getting R hip steroid inj, last one was for bursitis. Still hurts crissy today. Saw pain mx yesterday. Got minimal exercises to help moving.    7/8/2022:    Sister Betty presents for follow up. At last visit,w as found to be anemic which explained her SOB. She had iron deficiency and B12 deficiency. Was found to have GIB. Had multiple GI visits, EGD, was treated with cauterization x 3  for GAVE. This AM, had brown stool not black.    SOB is improved.    No gout flares since last visit.    Her major complaint is severe R hip pain, not responding to current pain mx, considers hip arthroplasty, but was told to wait till anemia gets fixed, also has to figure out her heart condition.      1/13/2023:     No gout flare today  Anakinra prn helps with flares  SOB is better  Hip OA pain is the same, considers arthroplasty.      7/21/2023:    Reviewed chart, labs, recent events.    -s/p R hip arthroplasty, recovered well  -Severe polyarticular gout flare last week requiring ER visit and prednisone 40-30-20-10 mg every day, each x 3 days then 5 mg a day, as anakinra qd inj was not enough to control it.  -better now    11/3/2023: Doing better than last visit, still flares with gout but not as bad, anakinra helps.    2/28/2024: Doing so well, no gout flares, no complaints.    8/23/2024:    Flare, severe L wrist pain/swelling after fall in July. No fracture. Prednisone+anakinra helped with decreased swelling, pain is severe.      Today 9/19/2024:    -L writ swelling/pain is much better after anakinra, prednisone taper, but still has residual pain    - r foot just started to hurt    Review of Systems:   A  comprehensive ROS was done. Positives are per HPI.      Active Medications:     Outpatient Medications Prior to Visit   Medication Sig Dispense Refill    acyclovir (ZOVIRAX) 400 MG tablet Take 1 tablet (400 mg) by mouth every 8 hours For a couple days 15 tablet 2    acyclovir (ZOVIRAX) 5 % external ointment Apply topically as needed (6 times day PRN for outbreaks) As needed for outbreaks 15 g 3    albuterol (PROAIR HFA/PROVENTIL HFA/VENTOLIN HFA) 108 (90 Base) MCG/ACT inhaler Inhale 2 puffs into the lungs every 6 hours as needed for wheezing or shortness of breath      allopurinol (ZYLOPRIM) 100 MG tablet TAKE 1 TABLET BY MOUTH EVERY DAY 90 tablet 1    anakinra (KINERET) 100 MG/0.67ML SOSY injection Inject 0.67 mLs (100 mg) Subcutaneous daily 20.1 mL 3    aspirin (ASA) 81 MG EC tablet Take 1 tablet (81 mg) by mouth 2 times daily 60 tablet 0    atorvastatin (LIPITOR) 10 MG tablet Take 0.5 tablets (5 mg) by mouth daily 45 tablet 2    azithromycin (ZITHROMAX) 250 MG tablet TAKE 1 TABLET BY MOUTH EVERY DAY 30 tablet 11    blood glucose (ACCU-CHEK SHANNON PLUS) test strip USE TO TEST BLOOD SUGARS 3 TIMES DAILY 300 strip 1    blood glucose (NO BRAND SPECIFIED) lancets standard Use to test blood sugar 3 times daily or as directed 100 Lancet 3    BREO ELLIPTA 100-25 MCG/ACT inhaler TAKE 1 PUFF BY MOUTH EVERY DAY 1 each 11    cevimeline (EVOXAC) 30 MG capsule Take 1 capsule (30 mg) by mouth 3 times daily as needed 270 capsule 3    Cholecalciferol (VITAMIN D3) 50 MCG (2000 UT) CAPS TAKE 100 MCG BY MOUTH DAILY (TAKE 2 TABLET (50 MCG) BY MOUTH DAILY - ORAL) 180 capsule 2    COMPOUNDED NON-CONTROLLED SUBSTANCE (CMPD RX) - PHARMACY TO MIX COMPOUNDED MEDICATION Estriol 1 mg/g Apply small amount to finger and apply to inside vagina daily for 2 weeks then twice weekly Route: vaginally Dispense 30 grams 11 refills 30 g 11    cyanocobalamin (VITAMIN B-12) 1000 MCG tablet Take 1 tablet (1,000 mcg) by mouth three times a week       escitalopram (LEXAPRO) 20 MG tablet Take 1 tablet (20 mg) by mouth daily 90 tablet 1    fluticasone (FLONASE) 50 MCG/ACT nasal spray SPRAY 1-2 SPRAYS INTO BOTH NOSTRILS DAILY AS NEEDED FOR ALLERGIES 16 mL 11    hydroxychloroquine (PLAQUENIL) 200 MG tablet TAKE 1 TABLET (200 MG) BY MOUTH DAILY GET ANNUAL EYE EXAMS FOR MONITORING AND FAX [phone] 90 tablet 0    insulin glargine (LANTUS SOLOSTAR) 100 UNIT/ML pen INJECT 15 UNITS SUBCUTANEOUS AT BEDTIME 30 mL 0    insulin lispro (HUMALOG KWIKPEN) 100 UNIT/ML (1 unit dial) KWIKPEN Inject Subcutaneous 3 times daily (before meals) Sliding scale insulin; tests BG three times day  If BG <150, no insulin given   If -200 take 2 units  If -250 take 4 units  If -300 take 6 units  If -350 take 10 units  If BG >350 take 14 units      insulin pen needle (BD PEN NEEDLE JANE 2ND GEN) 32G X 4 MM miscellaneous USE TO TEST 4 TIMES A  each 1    ketoconazole (NIZORAL) 2 % external cream Apply topically 2 times daily as needed for itching 60 g 1    levocetirizine (XYZAL) 5 MG tablet TAKE 1 TABLET BY MOUTH EVERY DAY IN THE EVENING 90 tablet 1    lidocaine (LIDODERM) 5 % patch APPLY PATCH TO PAINFUL AREA FOR UP TO 12 H WITHIN A 24 H PERIOD. REMOVE AFTER 12 HOURS. (Patient taking differently: Apply patch to painful area for up to 12 h within a 24 h period.  Remove after 12 hours.) 30 patch 2    loratadine (CLARITIN) 10 MG tablet TAKE 1 TABLET BY MOUTH EVERY DAY 90 tablet 3    methocarbamol (ROBAXIN) 750 MG tablet Take 1 tablet (750 mg) by mouth 3 times daily as needed for muscle spasms 90 tablet 3    metoprolol succinate ER (TOPROL XL) 25 MG 24 hr tablet Take 1 tablet (25 mg) by mouth 2 times daily. 180 tablet 3    mirabegron (MYRBETRIQ) 25 MG 24 hr tablet Take 1 tablet (25 mg) by mouth daily 30 tablet 11    montelukast (SINGULAIR) 10 MG tablet TAKE 1 TABLET BY MOUTH EVERYDAY AT BEDTIME 90 tablet 3    oxyCODONE-acetaminophen (PERCOCET) 5-325 MG tablet Take  1 tablet by mouth every 8 hours as needed for pain. 60 tablet 0    pantoprazole (PROTONIX) 40 MG EC tablet Take 1 tablet (40 mg) by mouth daily (replaces famotidine- should stop taking famotidine) 90 tablet 3    polyethylene glycol (MIRALAX) 17 g packet Take 17 g by mouth daily 10 packet 0    potassium chloride ER (KLOR-CON) 20 MEQ CR tablet Take 2 tablets (40 mEq) by mouth every morning AND 1 tablet (20 mEq) every evening. 270 tablet 3    predniSONE (DELTASONE) 10 MG tablet For gout flares, take the prednisone as 40-30-20-10 mg a day, each course x 3 days then go back to 5 mg a day 30 tablet 3    predniSONE (DELTASONE) 20 MG tablet 60-40-20-10 mg a day, each course x 3 days then go back on 5 mg a day 25 tablet 0    predniSONE (DELTASONE) 5 MG tablet Take 1 tablet (5 mg) by mouth daily 90 tablet 1    Semaglutide, 2 MG/DOSE, (OZEMPIC) 8 MG/3ML pen Inject 2 mg Subcutaneous every 7 days 9 mL 3    torsemide (DEMADEX) 20 MG tablet TAKE 2 TABLETS (40 MG) BY MOUTH EVERY MORNING AND 1 TABLET (20 MG) DAILY AT 2 PM. TAKE THE AFTERNOON DOSE WITH AN EXTRA 10 MG TAB FOR A TOTAL OF 30 MG IN THE AFTERNOON 270 tablet 3    traZODone (DESYREL) 50 MG tablet Take 3 tablets (150 mg) by mouth at bedtime 90 tablet 9     Facility-Administered Medications Prior to Visit   Medication Dose Route Frequency Provider Last Rate Last Admin    [START ON 12/10/2024] denosumab (PROLIA) injection 60 mg  60 mg Subcutaneous Q6 Months          Allergies:   Allergies   Allergen Reactions    Amoxicillin-Pot Clavulanate Nausea and Vomiting    Amoxicillin-Pot Clavulanate     Codeine Nausea and Vomiting     PN: LW Reaction: HIVES    Penicillins Nausea and Vomiting     PN: LW Reaction: GI Upset    Phenobarbital Itching    Seasonal Allergies         Past Medical History:  Alcohol abuse, in remission.   Allergic rhinitis.   Antiplatelet long-term use.   Atrial fibrillation.   Cardiomegaly.   Osteopenia.   Diverticulosis.   Benign essential hypertension.   GERD.  "  Insomnia.   Irregular heart beat.   Lumbago.   Major depressive disorder, recurrent episode, moderate.   ANGELICA.  Osteoarthrosis.   Sjogren's syndrome.   Sleep apnea.   Tobacco use disorder.   TMJ disorder.   RLS.  Major depressive disorder.   Hypertension.   Sciatica.   Colouterine fistula.   Left ventricular hypertrophy.   Breat fibroadenoma.   DVT.   Fatty liver disease, nonalcoholic.   Rib pain.   Neck mass.   Interstitial lung disease.   Acute and chronic respiratory failure with hypoxia.   Type II diabetes mellitus.   Hyperlipidemia.   Inflammatory arthritis.      Past Surgical History:  Back surgery.   Left breast biopsy.   Appendectomy.   Exploratory laparotomy.   Cardiac surgery.   Cholecystectomy.   Left colectomy.   Total abdominal hysterectomy with bilateral salpingo-oophorectomy.   Insert ureter stent.   Flexible sigmoidoscopy.   Ileostomy takedown.     Family History:   Mother: Positive for CAD, heart disease, hypertension, cerebrovascular disease, hyperlipidemia.   Father: Positive for alcohol/drug abuse, Alzheimer disease, dementia, hypertension, hyperlipidemia.   Sister: Positive for CAD, hypertension, and colorectal cancer.   Sister: Positive for hypertension and hyperlipidemia.   Sister: Positive for diabetes, lung cancer, asthma, and heart disease.   Brother: Positive for Parkinsonism and substance abuse.   Brother: Positive for hypertension, diverticulitis, and prostate cancer.   Daughter: Positive for breast cancer.        Social History:   She is a former smoker; quit in 2011. She has a history of alcohol use but stopped drinking in 1986.      Physical Exam:     Ht 1.651 m (5' 5\")   Wt 81.2 kg (179 lb)   LMP  (LMP Unknown)   BMI 29.79 kg/m      Constitutional: NAD, very pleasant, sister Nghia present   Eyes: Normal EOM, conjunctiva, sclera  Chest: CTAB  CV: no M/R/G, RRR  Abdomen: soft, NT  MSK: erythema/warmth/severe tenderness with minimal effusion of L wrist  Skin: No rash  Neuro: grossly " non-focal  Psych: Normal affect.    Images:  CT Chest w/o contrast (7/25/18):  Findings remain most consistent with small airway disease  and/or nonclassifiable interstitial lung disease and are not  significantly changed from the March 2017 comparison.    Per radiology.    Laboratory:       Latest Ref Rng & Units 4/25/2024     4:35 PM 7/8/2024     1:29 PM 9/12/2024    11:38 AM   RHEUM RESULTS   ALT 0 - 50 U/L  12     AST 0 - 45 U/L  20     Creatinine 0.51 - 0.95 mg/dL 1.02  0.97  0.96    CRP Inflammation <5.00 mg/L  19.00     GFR Estimate >60 mL/min/1.73m2 54  57  58    Hematocrit 35.0 - 47.0 %  40.9     Hemoglobin 11.7 - 15.7 g/dL  12.9     WBC 4.0 - 11.0 10e3/uL  9.4     RBC Count 3.80 - 5.20 10e6/uL  4.41     RDW 10.0 - 15.0 %  13.9     MCHC 31.5 - 36.5 g/dL  31.5     MCV 78 - 100 fL  93     Platelet Count 150 - 450 10e3/uL  205         Rheumatoid Factor   Date Value Ref Range Status   07/24/2015 <20 <20 IU/mL Final   ,  ,   Cyclic Cit Pept IgG/IgA   Date Value Ref Range Status   07/24/2015 <20  Interpretation:  Negative   <20 UNITS Final   ,  ,   Scleroderma Antibody Scl-70 CECILIA IgG   Date Value Ref Range Status   07/24/2015  0.0 - 0.9 AI Final    <0.2  Negative   Antibody index (AI) values reflect qualitative changes in antibody   concentration that cannot be directly associated with clinical condition or   disease state.       SSA (Ro) (CECILIA) Antibody, IgG   Date Value Ref Range Status   07/24/2015 1.6 (H) 0.0 - 0.9 AI Final     Comment:     Positive   Antibody index (AI) values reflect qualitative changes in antibody   concentration that cannot be directly associated with clinical condition or   disease state.       SSB (La) (CECILIA) Antibody, IgG   Date Value Ref Range Status   07/24/2015  0.0 - 0.9 AI Final    <0.2  Negative   Antibody index (AI) values reflect qualitative changes in antibody   concentration that cannot be directly associated with clinical condition or   disease state.       ,  ,   RG Screen  by EIA   Date Value Ref Range Status   07/24/2015 <1.0  Interpretation:  Negative   <1.0 Final   ,  ,  ,  ,  ,  ,  ,  ,  ,  ,  ,  ,  ,  ,  ,  ,  ,  ,   Neutrophil Cytoplasmic IgG Antibody   Date Value Ref Range Status   07/24/2015   Final    <1:20  Reference range: <1:20  (Note)  The ANCA IFA is <1:20; therefore, no further testing will  be performed.  INTERPRETIVE INFORMATION: Anti-Neutrophil Cyto Ab, IgG  Neutrophil Cytoplasmic Antibodies (C-ANCA = granular  cytoplasmic staining, P-ANCA = perinuclear staining) are  found in the serum of over 90 percent of patients with  certain necrotizing systemic vasculitides, and usually in  less than 5 percent of patients with collagen vascular  disease or arthritis.  Performed by [facility],  [address] [place] [id number] [phone]  [email or web address], Burke Mckeon MD, Lab. Director       ,  ,  ,  ,  ,  ,  ,   IGG   Date Value Ref Range Status   07/24/2015 1320 695 - 1620 mg/dL Final   ,  ,  ,  ,  ,   Scleroderma Antibody Scl-70 CECILIA IgG   Date Value Ref Range Status   07/24/2015  0.0 - 0.9 AI Final    <0.2  Negative   Antibody index (AI) values reflect qualitative changes in antibody   concentration that cannot be directly associated with clinical condition or   disease state.          CBC RESULTS: 6/4/2021   Lab Test 06/04/21  1422   WBC 8.6   RBC 4.46   HGB 13.4   HCT 42.1   MCV 94   MCH 30.0   MCHC 31.8   RDW 15.5*        Last Comprehensive Metabolic Panel:  Sodium   Date Value Ref Range Status   09/12/2024 137 135 - 145 mmol/L Final   06/04/2021 137 133 - 144 mmol/L Final     Potassium   Date Value Ref Range Status   09/12/2024 4.2 3.4 - 5.3 mmol/L Final   08/23/2022 4.1 3.4 - 5.3 mmol/L Final   06/04/2021 4.0 3.4 - 5.3 mmol/L Final     Chloride   Date Value Ref Range Status   09/12/2024 98 98 - 107 mmol/L Final   08/23/2022 107 94 - 109 mmol/L Final   06/04/2021 106 94 - 109 mmol/L Final     Carbon Dioxide   Date Value Ref Range Status   06/04/2021 25  20 - 32 mmol/L Final     Carbon Dioxide (CO2)   Date Value Ref Range Status   09/12/2024 28 22 - 29 mmol/L Final   08/23/2022 22 20 - 32 mmol/L Final     Anion Gap   Date Value Ref Range Status   09/12/2024 11 7 - 15 mmol/L Final   08/23/2022 8 3 - 14 mmol/L Final   06/04/2021 6 3 - 14 mmol/L Final     Glucose   Date Value Ref Range Status   09/12/2024 428 (H) 70 - 99 mg/dL Final   08/23/2022 198 (H) 70 - 99 mg/dL Final   06/04/2021 142 (H) 70 - 99 mg/dL Final     GLUCOSE BY METER POCT   Date Value Ref Range Status   06/05/2023 259 (H) 70 - 99 mg/dL Final     Urea Nitrogen   Date Value Ref Range Status   09/12/2024 12.6 8.0 - 23.0 mg/dL Final   08/23/2022 17 7 - 30 mg/dL Final   06/04/2021 14 7 - 30 mg/dL Final     Creatinine   Date Value Ref Range Status   09/12/2024 0.96 (H) 0.51 - 0.95 mg/dL Final   06/04/2021 1.11 (H) 0.52 - 1.04 mg/dL Final     GFR Estimate   Date Value Ref Range Status   09/12/2024 58 (L) >60 mL/min/1.73m2 Final     Comment:     eGFR calculated using 2021 CKD-EPI equation.   06/04/2021 47 (L) >60 mL/min/[1.73_m2] Final     Comment:     Non  GFR Calc  Starting 12/18/2018, serum creatinine based estimated GFR (eGFR) will be   calculated using the Chronic Kidney Disease Epidemiology Collaboration   (CKD-EPI) equation.       Calcium   Date Value Ref Range Status   09/12/2024 9.3 8.8 - 10.4 mg/dL Final     Comment:     Reference intervals for this test were updated on 7/16/2024 to reflect our healthy population more accurately. There may be differences in the flagging of prior results with similar values performed with this method. Those prior results can be interpreted in the context of the updated reference intervals.   06/04/2021 8.8 8.5 - 10.1 mg/dL Final       ESR 6/4/2021: 10.0    CRP 6/4/2021: 3.0    Uric acid 6/4/2021: 6.2    Component      Latest Ref Rng & Units 2/11/2022   WBC      4.0 - 11.0 10e3/uL 11.5 (H)   RBC Count      3.80 - 5.20 10e6/uL 3.47 (L)   Hemoglobin       11.7 - 15.7 g/dL 9.1 (L)   Hematocrit      35.0 - 47.0 % 30.2 (L)   MCV      78 - 100 fL 87   MCH      26.5 - 33.0 pg 26.2 (L)   MCHC      31.5 - 36.5 g/dL 30.1 (L)   RDW      10.0 - 15.0 % 15.1 (H)   Platelet Count      150 - 450 10e3/uL 283   % Neutrophils      % 76   % Lymphocytes      % 9   % Monocytes      % 11   % Eosinophils      % 2   % Basophils      % 1   % Immature Granulocytes      % 1   NRBCs per 100 WBC      <1 /100 0   Absolute Neutrophils      1.6 - 8.3 10e3/uL 8.9 (H)   Absolute Lymphocytes      0.8 - 5.3 10e3/uL 1.0   Absolute Monocytes      0.0 - 1.3 10e3/uL 1.2   Absolute Eosinophils      0.0 - 0.7 10e3/uL 0.2   Absolute Basophils      0.0 - 0.2 10e3/uL 0.1   Absolute Immature Granulocytes      <=0.4 10e3/uL 0.1   Absolute NRBCs      10e3/uL 0.0   Albumin Fraction      3.7 - 5.1 g/dL 3.9   Alpha 1 Fraction      0.2 - 0.4 g/dL 0.4   Alpha 2 Fraction      0.5 - 0.9 g/dL 0.8   Beta Fraction      0.6 - 1.0 g/dL 1.0   Gamma Fraction      0.7 - 1.6 g/dL 0.8   Monoclonal Peak      <=0.0 g/dL 0.0   ELP Interpretation:       Essentially normal electrophoretic pattern. No obvious monoclonal proteins seen. Pathologic significance requires clinical correlation. Meghan Brothers M.D., Ph.D.   Iron      35 - 180 ug/dL 17 (L)   Iron Binding Cap      240 - 430 ug/dL 354   Iron Saturation Index      15 - 46 % 5 (L)   Creatinine      0.52 - 1.04 mg/dL 1.00   GFR Estimate      >60 mL/min/1.73m2 56 (L)   % Retic      0.5 - 2.0 % 2.7 (H)   Absolute Retic      0.025 - 0.095 10e6/uL 0.090   ALT      0 - 50 U/L 17   Albumin      3.4 - 5.0 g/dL 3.3 (L)   AST      0 - 45 U/L 15   Sed Rate      0 - 30 mm/hr 36 (H)   CRP Inflammation      0.0 - 8.0 mg/L 12.5 (H)   Uric Acid      2.6 - 6.0 mg/dL 6.1 (H)   Immunofixation ELP       No monoclonal protein seen on immunofixation. Pathologic significance requires clinical correlation.  Meghan Brothers M.D., Ph.D.   Immunofix ELP Urine       No monoclonal protein seen on immunofixation.  Pathologic significance requires clinical correlation.  Meghan Brothers M.D., Ph.D.   Total Protein Serum for ELP      6.8 - 8.8 g/dL 6.9   N-Terminal Pro Bnp      0 - 450 pg/mL 239   Haptoglobin      32 - 197 mg/dL 149   Vitamin B12      193 - 986 pg/mL 162 (L)   Ferritin      8 - 252 ng/mL 14

## 2024-09-19 NOTE — NURSING NOTE
"  Current patient location: [address] [place] [address] [address]   [place] [address]    Is the patient currently in the state of [place]? YES    Visit mode:VIDEO    If the visit is dropped, the patient can be reconnected by: VIDEO VISIT: Text to cell phone:   Telephone Information:   Mobile [phone]    and VIDEO VISIT: Send to e-mail at: [email or web address]    Will anyone else be joining the visit? [employer]  (If patient encounters technical issues they should call [phone]    How would you like to obtain your AVS? MyChart    Are changes needed to the allergy or medication list? Pt declined med review and Pt stated no med changes    Are refills needed on medications prescribed by this physician? NO    Rooming Documentation:  Patient declined to complete questionnaire(s)      Reason for visit: RECHECK (Wrist pain, right foot pain, diabetes \"out of control\")      Archana Lopez VVF     "

## 2024-09-22 NOTE — RESULT ENCOUNTER NOTE
--Your hemoglobin A1C (the three month average of your glucose levels) is back up to a pretty high level at 10.4, likely partially from your recent increased use of prednisone, but I am glad you have an appointment coming up fairly soon with Sabrina Meek/SIM to discuss it in more depth.    Best,   Lev Tristan MD

## 2024-09-26 ENCOUNTER — MYC MEDICAL ADVICE (OUTPATIENT)
Dept: CARDIOLOGY | Facility: CLINIC | Age: 84
End: 2024-09-26
Payer: COMMERCIAL

## 2024-10-03 ENCOUNTER — TELEPHONE (OUTPATIENT)
Dept: RHEUMATOLOGY | Facility: CLINIC | Age: 84
End: 2024-10-03
Payer: COMMERCIAL

## 2024-10-03 NOTE — TELEPHONE ENCOUNTER
Patient confirmed scheduled appointment:  Date: January 23rd, 2025  Time: 1pm  Visit type: Return Rheumatology Video  Provider: Dr. Lopez  Location: [facility]  Testing/imaging: NA  Additional notes: NA

## 2024-10-06 DIAGNOSIS — M10.9 ACUTE GOUT OF LEFT FOOT, UNSPECIFIED CAUSE: ICD-10-CM

## 2024-10-06 DIAGNOSIS — M06.00 SERONEGATIVE RHEUMATOID ARTHRITIS (H): Primary | ICD-10-CM

## 2024-10-10 ENCOUNTER — MYC MEDICAL ADVICE (OUTPATIENT)
Dept: CARDIOLOGY | Facility: CLINIC | Age: 84
End: 2024-10-10
Payer: COMMERCIAL

## 2024-10-10 DIAGNOSIS — E78.5 HYPERLIPIDEMIA, UNSPECIFIED HYPERLIPIDEMIA TYPE: ICD-10-CM

## 2024-10-10 DIAGNOSIS — I48.19 PERSISTENT ATRIAL FIBRILLATION (H): ICD-10-CM

## 2024-10-11 RX ORDER — PREDNISONE 5 MG/1
5 TABLET ORAL DAILY
Qty: 90 TABLET | Refills: 1 | Status: SHIPPED | OUTPATIENT
Start: 2024-10-11

## 2024-10-14 ENCOUNTER — TELEPHONE (OUTPATIENT)
Dept: RHEUMATOLOGY | Facility: CLINIC | Age: 84
End: 2024-10-14
Payer: COMMERCIAL

## 2024-10-14 DIAGNOSIS — M25.532 WRIST PAIN, LEFT: Primary | ICD-10-CM

## 2024-10-14 NOTE — TELEPHONE ENCOUNTER
[facility] Call Center    Phone Message    May a detailed message be left on voicemail: yes     Reason for Call: Order(s): Other:   Reason for requested: Nghia states pt is still having trouble with their hand and is wishing to schedule an MRI. Please call back Nghia to advise. Thank you.   Date needed: Asap  Provider name: Dr. Lopez    Action Taken: Other: Rheum    Travel Screening: Not Applicable     Date of Service: 10/14/24

## 2024-10-15 NOTE — TELEPHONE ENCOUNTER
"Per Dr. Lopez notes on 9/19/24, Patient Instructions: \"Let me know if you want L wrist MRI to be done\"    Called and spoke with Nghia, main caregiver/point of contact for Betty. Nghia reports there is no longer swelling in the L wrist but a nerve pain still is quite prominent. This is a \"shooting\" pain that occurs at the base of her left thumb and travels up the arm. Pain is produced by rotation of the wrist.     Left wrist MRI pended and sent to Dr. Lopez for review. Assessment details placed into MRI referral.         Tamara Kelley RN  [facility]    "

## 2024-10-16 RX ORDER — METOPROLOL SUCCINATE 50 MG/1
50 TABLET, EXTENDED RELEASE ORAL 2 TIMES DAILY
Qty: 180 TABLET | Refills: 3 | Status: SHIPPED | OUTPATIENT
Start: 2024-10-16

## 2024-10-16 NOTE — TELEPHONE ENCOUNTER
Date: 10/16/2024    Time of Call: 2:57 PM     Diagnosis:  Heart failure      [ VORB ] Ordering provider: Dr Rosa    Order: increase metoprolol back to 50 mg BID      Order received by: Flor Velasquez RN       Follow-up/additional notes: will follow up with BP log in 1-2 weeks

## 2024-10-17 ENCOUNTER — VIRTUAL VISIT (OUTPATIENT)
Dept: PHARMACY | Facility: CLINIC | Age: 84
End: 2024-10-17
Payer: COMMERCIAL

## 2024-10-17 DIAGNOSIS — E11.65 TYPE 2 DIABETES MELLITUS WITH HYPERGLYCEMIA, WITH LONG-TERM CURRENT USE OF INSULIN (H): Primary | ICD-10-CM

## 2024-10-17 DIAGNOSIS — Z79.4 TYPE 2 DIABETES MELLITUS WITH HYPERGLYCEMIA, WITH LONG-TERM CURRENT USE OF INSULIN (H): Primary | ICD-10-CM

## 2024-10-17 DIAGNOSIS — M10.9 GOUT, UNSPECIFIED CAUSE, UNSPECIFIED CHRONICITY, UNSPECIFIED SITE: ICD-10-CM

## 2024-10-17 PROCEDURE — 99607 MTMS BY PHARM ADDL 15 MIN: CPT | Mod: 95 | Performed by: PHARMACIST

## 2024-10-17 PROCEDURE — 99606 MTMS BY PHARM EST 15 MIN: CPT | Mod: 95 | Performed by: PHARMACIST

## 2024-10-17 NOTE — PATIENT INSTRUCTIONS
"Recommendations from today's MTM visit:                                                    MTM (medication therapy management) is a service provided by a clinical pharmacist designed to help you get the most of out of your medicines.   Today we reviewed what your medicines are for, how to know if they are working, that your medicines are safe and how to make your medicine regimen as easy as possible.      Stop taking Ozempic 2 mg weekly and start taking Mounjaro 5 mg weekly. Give your first Mounjaro dose when you would be due to give your next Ozempic dose.  Restart Lantus 16 units daily.  Adjust sliding scale for Novolog:  If -200 take 4 units   If -250 take 6 units   If -300 take 8 units   If -350 take 12 units   If BG >350 take 16 units   Let us know if you change your mind about a continuous glucose monitor (Dexcom G7 or Freestyle Sam 3 Plus).    Follow-up: Return in about 12 days (around 10/29/2024).    It was great speaking with you today.  I value your experience and would be very thankful for your time in providing feedback in our clinic survey. In the next few days, you may receive an email or text message from [email or web address] with a link to a survey related to your  clinical pharmacist.\"     To schedule another MTM appointment, please call the clinic directly or you may call the MTM scheduling line at [phone] or toll-free at [phone].     My Clinical Pharmacist's contact information:                                                      Please feel free to contact me with any questions or concerns you have.      Sabrina Meek, Pharm.D., M.B.A., [place]  MTM Pharmacist, [facility]  Pager: [phone]  Email: [email or web address]      "

## 2024-10-17 NOTE — PROGRESS NOTES
Medication Therapy Management (MTM) Encounter    ASSESSMENT:                            Medication Adherence/Access: See below for considerations.    Diabetes:  Patient is not meeting A1c goal of < 8%.  Self monitoring of blood glucose is not at goal of fasting  mg/dL and post prandial < 180 mg/dL.  Patient would benefit from switching to Mounjaro from Ozempic for additional glucose control. Patient should also restart her Lantus every morning and increase Novolog sliding scale. Patient would benefit from a continuous glucose monitor for more comprehensive blood sugar monitoring, but is not interested at this time.     Gout:  Plan in place with rheumatology.    PLAN:                            Stop taking Ozempic 2 mg weekly and start taking Mounjaro 5 mg weekly. Give your first Mounjaro dose when you would be due to give your next Ozempic dose.  Restart Lantus 16 units daily.  Adjust sliding scale for Novolog:  If -200 take 4 units   If -250 take 6 units   If -300 take 8 units   If -350 take 12 units   If BG >350 take 16 units   Let us know if you change your mind about a continuous glucose monitor (Dexcom G7 or Freestyle Sam 3 Plus).    Follow-up: Return in about 12 days (around 10/29/2024) for Follow up, using a video visit.    SUBJECTIVE/OBJECTIVE:                          Betty Tee is a 84 year old female seen for a follow-up visit. Patient was accompanied by Nghia.      Reason for visit: Diabetes follow-up.    Allergies/ADRs: Reviewed in chart  Past Medical History: Reviewed in chart  Tobacco: She reports that she quit smoking about 13 years ago. Her smoking use included cigarettes. She started smoking about 54 years ago. She has a 20.8 pack-year smoking history. She has never used smokeless tobacco.  Medication Adherence/Access: no issues reported.  -----------------------------------------------------------------------------------------------------------------  Diabetes    Ozempic 2mg weekly  Aspirin 81mg daily  Lantus 20 units daily - has not been using for the last few days because she was not sure how to, had reduced to 16 units daily before stopping   Novolog sliding scale below - takes with each meal, usually skips lunch so takes with breakfast and dinner. Finishing up supply of Novolog then will switch to Humalog  If -200 take 2 units   If -250 take 4 units   If -300 take 6 units   If -350 take 10 units   If BG >350 take 14 units     States she is testing 2 times/day before her first meal (around 11), prior to dinner and then sometimes at bedtime.   Blood sugar is 365 today, usually is in the 300s per Nghia (at night)  Is having symptoms of high blood suar (hungry, thirsty, frequent urination)  No low blood sugars.    No side effects reported with Ozempic.    Not sure if she wants to use a CGM, but says she will think about it.    Received Covid, flu, and pneumococcal vaccines yesterday     Eye exam is up to date  Foot exam: due    Gout:   Allopurinol 100 mg daily  Prednisone 5 mg daily - has been on this dose since 9/7/24  Blood sugars have not come down since finishing her last prednisone burst  Below joint of thumb and down into wrist are having a gout flare, getting an MRI soon, starting to improve as of yesterday  ----------------    I spent 22 minutes with this patient today. All changes were made via collaborative practice agreement with Ilene Tristan MD. A copy of the visit note was provided to the patient's provider(s).    A summary of these recommendations was sent via [employer].    Meghan Pacheco, MarcosD  Medication Therapy Management Resident  Sabrina Meek, PharmD, JAMES, BCACP   Medication Therapy Management Pharmacist      Telemedicine Visit Details  The patient's medications can be safely assessed via a telemedicine encounter.  Type of service:  Video Conference via [employer]  Originating Location (pt. Location): Home    Distant  Location (provider location):  On-site  Start Time:  12:32 PM  End Time: 12:55 PM     Medication Therapy Recommendations  Type 2 diabetes mellitus with hyperglycemia, with long-term current use of insulin (H)    Current Medication: Semaglutide, 2 MG/DOSE, (OZEMPIC) 8 MG/3ML pen (Discontinued)   Rationale: More effective medication available - Ineffective medication - Effectiveness   Recommendation: Change Medication - Mounjaro 5 MG/0.5ML Soaj   Status: Accepted per CPA          Current Medication: insulin glargine (LANTUS SOLOSTAR) 100 UNIT/ML pen   Rationale: Does not understand instructions - Adherence - Adherence   Recommendation: Provide Education - insulin glargine 100 UNIT/ML pen   Status: Patient Agreed - Adherence/Education          Current Medication: insulin glargine (LANTUS SOLOSTAR) 100 UNIT/ML pen   Rationale: Medication requires monitoring - Needs additional monitoring   Recommendation: Start Medication - FreeStyle Sam 3 Plus Sensor Misc   Status: Declined per Patient          Current Medication: insulin lispro (HUMALOG KWIKPEN) 100 UNIT/ML (1 unit dial) KWIKPEN   Rationale: Dose too low - Dosage too low - Effectiveness   Recommendation: Increase Dose - HumaLOG KWIKpen 100 UNIT/ML soln   Status: Accepted per CPA

## 2024-10-21 ENCOUNTER — MYC MEDICAL ADVICE (OUTPATIENT)
Dept: PEDIATRICS | Facility: CLINIC | Age: 84
End: 2024-10-21
Payer: COMMERCIAL

## 2024-10-21 ENCOUNTER — NURSE TRIAGE (OUTPATIENT)
Dept: FAMILY MEDICINE | Facility: CLINIC | Age: 84
End: 2024-10-21
Payer: COMMERCIAL

## 2024-10-21 DIAGNOSIS — M10.9 GOUT, UNSPECIFIED CAUSE, UNSPECIFIED CHRONICITY, UNSPECIFIED SITE: ICD-10-CM

## 2024-10-21 NOTE — TELEPHONE ENCOUNTER
S-(situation): milton called clinic regarding a medication question.     B-(background): pt is an 84 yr old w/cardiac hx.     A-(assessment): Pt has a dentist appt tmw and needs to know if she needs to take an abx prior to filling and extraction.Tmw's dental appt  is just for filling. And has an extraction on November 14th. Would like prescriptions sent to [facility] in [place] if it is needed.     R-(recommendations): informed pt I will send a note to her PCP right away and we will call her back with an update. Routed to Dr. Tristan.     Reason for Disposition   Caller has URGENT medicine question about med that PCP or specialist prescribed and triager unable to answer question    Additional Information   Negative: Intentional drug overdose and suicidal thoughts or ideas   Negative: Drug overdose and triager unable to answer question   Negative: Caller requesting a renewal or refill of a medicine patient is currently taking   Negative: Caller requesting information unrelated to medicine   Negative: Caller requesting information about COVID-19 Vaccine   Negative: Caller requesting information about Emergency Contraception   Negative: Caller requesting information about Combined Birth Control Pills   Negative: Caller requesting information about Progestin Birth Control Pills   Negative: Caller requesting information about Post-Op pain or medicines   Negative: Caller requesting a prescription antibiotic (such as penicillin) for Strep throat and has a positive culture result   Negative: Caller requesting a prescription anti-viral med (such as Tamiflu) and has influenza (flu) symptoms   Negative: Immunization reaction suspected   Negative: Rash while taking a medicine or within 3 days of stopping it   Negative: Asthma and having symptoms of asthma (cough, wheezing, etc.)   Negative: Symptom of illness (e.g., headache, abdominal pain, earache, vomiting) that are more than mild   Negative: Breastfeeding  "questions about mother's medicines and diet   Negative: MORE THAN A DOUBLE DOSE of a prescription or over-the-counter (OTC) drug   Negative: DOUBLE DOSE (an extra dose or lesser amount) of prescription drug and any symptoms (e.g., dizziness, nausea, pain, sleepiness)   Negative: DOUBLE DOSE (an extra dose or lesser amount) of over-the-counter (OTC) drug and any symptoms (e.g., dizziness, nausea, pain, sleepiness)   Negative: Took another person's prescription drug   Negative: DOUBLE DOSE (an extra dose or lesser amount) of prescription drug and NO symptoms  (Exception: A double dose of antibiotics.)   Negative: Diabetes drug error or overdose (e.g., took wrong type of insulin or took extra dose)   Negative: Caller has medication question about med NOT prescribed by PCP and triager unable to answer question (e.g., compatibility with other med, storage)   Negative: Pharmacy calling with prescription question and triager unable to answer question    Answer Assessment - Initial Assessment Questions  1. NAME of MEDICINE: \"What medicine(s) are you calling about?\"      Wondering if she needs an antibiotic prior to dental work   2. QUESTION: \"What is your question?\" (e.g., double dose of medicine, side effect)      See above  3. PRESCRIBER: \"Who prescribed the medicine?\" Reason: if prescribed by specialist, call should be referred to that group.      unsure  4. SYMPTOMS: \"Do you have any symptoms?\" If Yes, ask: \"What symptoms are you having?\"  \"How bad are the symptoms (e.g., mild, moderate, severe)      No pt has dental work coming up  5. PREGNANCY:  \"Is there any chance that you are pregnant?\" \"When was your last menstrual period?\"      N/a    Protocols used: Medication Question Call-A-OH    "

## 2024-10-21 NOTE — TELEPHONE ENCOUNTER
"Call placed to pt no answer, left message to call [facility]. Attempted to reach regarding Dr. Tristan's message,    \"  Ilene Tristan MD Physician Signed3:40 PM        Reviewed cardiology consultations.  3/23 consult- \"No longer requires SBE prophylaxis given that she is 6 months post LAAO\"  She does not need antibiotics for upcoming dental work.  Thanks- CW\"        "

## 2024-10-21 NOTE — TELEPHONE ENCOUNTER
"Reviewed cardiology consultations.  3/23 consult- \"No longer requires SBE prophylaxis given that she is 6 months post LAAO\"  She does not need antibiotics for upcoming dental work.  Thanks- CW  "

## 2024-10-22 NOTE — TELEPHONE ENCOUNTER
Left message for patient to call [facility] back  When patient calls back please transfer to an RN  Yvonne GARCIA RN

## 2024-10-22 NOTE — TELEPHONE ENCOUNTER
Pt called the clinic back and was relayed message from Dr. Tristan on 10/21/24. Please see my chart. Thanks    Lilibeth Pérez RN  [facility]

## 2024-10-23 NOTE — TELEPHONE ENCOUNTER
Talked with pt. Given MD's message. Pt expressed understanding. All questions were answered.     Yvonne GARCIA RN

## 2024-10-23 NOTE — TELEPHONE ENCOUNTER
Anakinra (Kineret)     Inject 0.67 mLs (100 mg) Subcutaneous daily - Subcutaneous   Last Written Prescription Date:  9/26/23  Last Fill Quantity: 20.1ml,   # refills: 3  Last Office Visit: 9/19/24  Future Office visit:  1/23/25    CBC RESULTS:   Recent Labs   Lab Test 07/08/24  1329   WBC 9.4   RBC 4.41   HGB 12.9   HCT 40.9   MCV 93   MCH 29.3   MCHC 31.5   RDW 13.9          Creatinine   Date Value Ref Range Status   09/12/2024 0.96 (H) 0.51 - 0.95 mg/dL Final   06/04/2021 1.11 (H) 0.52 - 1.04 mg/dL Final   ]    Liver Function Studies -   Recent Labs   Lab Test 07/08/24  1329 01/16/24  1430 08/01/23  1508   PROTTOTAL  --   --  7.1   ALBUMIN  --  4.2 4.0   BILITOTAL  --   --  0.3   ALKPHOS  --   --  106*   AST 20 18 18   ALT 12 10 12       Routing refill request to provider for review.     Tamara Kelley, RN  Adult [facility]ology Clinic

## 2024-10-24 DIAGNOSIS — M10.9 GOUT, UNSPECIFIED CAUSE, UNSPECIFIED CHRONICITY, UNSPECIFIED SITE: ICD-10-CM

## 2024-10-24 RX ORDER — ANAKINRA 100 MG/.67ML
100 INJECTION, SOLUTION SUBCUTANEOUS DAILY
Qty: 20.1 ML | Refills: 3 | Status: SHIPPED | OUTPATIENT
Start: 2024-10-24

## 2024-10-24 RX ORDER — ANAKINRA 100 MG/.67ML
100 INJECTION, SOLUTION SUBCUTANEOUS DAILY
Qty: 20.1 ML | Refills: 3 | OUTPATIENT
Start: 2024-10-24

## 2024-10-29 ENCOUNTER — VIRTUAL VISIT (OUTPATIENT)
Dept: PHARMACY | Facility: CLINIC | Age: 84
End: 2024-10-29
Payer: COMMERCIAL

## 2024-10-29 DIAGNOSIS — E11.65 TYPE 2 DIABETES MELLITUS WITH HYPERGLYCEMIA, WITH LONG-TERM CURRENT USE OF INSULIN (H): Primary | ICD-10-CM

## 2024-10-29 DIAGNOSIS — Z79.4 TYPE 2 DIABETES MELLITUS WITH HYPERGLYCEMIA, WITH LONG-TERM CURRENT USE OF INSULIN (H): Primary | ICD-10-CM

## 2024-10-29 PROCEDURE — 99606 MTMS BY PHARM EST 15 MIN: CPT | Mod: 95 | Performed by: PHARMACIST

## 2024-10-29 NOTE — PATIENT INSTRUCTIONS
"Recommendations from today's MTM visit:                                                    MTM (medication therapy management) is a service provided by a clinical pharmacist designed to help you get the most of out of your medicines.   Today we reviewed what your medicines are for, how to know if they are working, that your medicines are safe and how to make your medicine regimen as easy as possible.      Take 2 more doses of Mounjaro 5 mg weekly then increase to 7.5 mg weekly    Follow-up: video visit on 11/19 at 10:30 AM    It was great speaking with you today.  I value your experience and would be very thankful for your time in providing feedback in our clinic survey. In the next few days, you may receive an email or text message from [facility] [employer] with a link to a survey related to your  clinical pharmacist.\"     To schedule another MTM appointment, please call the clinic directly or you may call the MTM scheduling line at [phone] or toll-free at [phone].     My Clinical Pharmacist's contact information:                                                      Please feel free to contact me with any questions or concerns you have.      Meghan Pacheco PharmD  Medication Therapy Management Resident, [facility]  Pager: [phone]  Email: [email or web address]    Sabrina Meek, Pharm.D., M.B.A., [facility]  Medication Therapy Management Pharmacist, [facility]  Pager: [phone]  Email: [email or web address]   "

## 2024-10-29 NOTE — PROGRESS NOTES
Medication Therapy Management (MTM) Encounter    ASSESSMENT:                            Medication Adherence/Access: No issues identified.    Diabetes  Patient is not meeting A1c goal of < 8%.   Self monitoring of blood glucose is not at goal of fasting  mg/dL and post prandial < 180 mg/dL, but is greatly improved from last visit. Patient would benefit from continuing to increase Mounjaro dose.     PLAN:                            Take 2 more doses of Mounjaro 5 mg weekly then increase to 7.5 mg weekly    Follow-up: video visit on 11/19 at 10:30 AM    SUBJECTIVE/OBJECTIVE:                          Betty Tee is a 84 year old female contacted via secure video for a follow-up visit. Patient was accompanied by caretaker Nghia.     Reason for visit: Diabetes follow-up.    Allergies/ADRs: Reviewed in chart  Past Medical History: Reviewed in chart  Tobacco: She reports that she quit smoking about 13 years ago. Her smoking use included cigarettes. She started smoking about 54 years ago. She has a 20.8 pack-year smoking history. She has never used smokeless tobacco.    Medication Adherence/Access: no issues reported.    Diabetes   Mounjaro 5 mg weekly  Aspirin 81mg daily  Lantus 16 units daily  Novolog sliding scale below - takes with each meal, usually skips lunch so takes with breakfast and dinner. Finishing up supply of Novolog then will switch to Humalog - typically using 6-8 units  If -200 take 4 units  If -250 take 6 units  If -300 take 8 units  If -350 take 12 units  If BG >350 take 16 units    States she is testing 2 times/day before her first meal (around 11), prior to dinner and then sometimes at bedtime.  Fasting blood sugars (patient reported):  10/24 AM: 211  10/25: 178  10/26: 252  10/27: 181  10/28: 190  10/29: 190  Has had two doses of Mounjaro so far, no nausea or constipation.       Eye exam is up to date  Foot exam: due    Today's Vitals: LMP  (LMP Unknown)    ----------------      I spent 13 minutes with this patient today. All changes were made via collaborative practice agreement with Ilene Tristan MD. A copy of the visit note was provided to the patient's provider(s).    A summary of these recommendations was sent via [employer].    Meghan Pacheco PharmD  Medication Therapy Management Resident    Preceptor cosignature: Betty Tee was seen independently by Dr. Pacheco. I have reviewed the assessment and plan. Sabrina Meek, MarcosD, JAMES, BCACP      Telemedicine Visit Details  The patient's medications can be safely assessed via a telemedicine encounter.  Type of service:  Video Conference via [employer]  Originating Location (pt. Location): Home    Distant Location (provider location):  On-site  Joined the call at 10/29/2024, 11:02:44 am.  Left the call at 10/29/2024, 11:16:25 am.  You were on the call for 13 minutes 40 seconds     Medication Therapy Recommendations  Type 2 diabetes mellitus with hyperglycemia, with long-term current use of insulin (H)   1 Current Medication: Tirzepatide 5 MG/0.5ML SOAJ (Discontinued)   Current Medication Sig: Inject 0.5 mLs (5 mg) subcutaneously every 7 days.   Rationale: Dose too low - Dosage too low - Effectiveness   Recommendation: Increase Dose - Tirzepatide 7.5 MG/0.5ML Soaj   Status: Accepted per CPA   Identified Date: 10/29/2024 Completed Date: 10/29/2024

## 2024-11-03 DIAGNOSIS — M35.02 SJOGREN'S SYNDROME WITH LUNG INVOLVEMENT (H): ICD-10-CM

## 2024-11-03 DIAGNOSIS — F33.1 MAJOR DEPRESSIVE DISORDER, RECURRENT EPISODE, MODERATE (H): ICD-10-CM

## 2024-11-03 DIAGNOSIS — I50.30 (HFPEF) HEART FAILURE WITH PRESERVED EJECTION FRACTION (H): ICD-10-CM

## 2024-11-04 RX ORDER — ESCITALOPRAM OXALATE 20 MG/1
20 TABLET ORAL DAILY
Qty: 90 TABLET | Refills: 1 | Status: SHIPPED | OUTPATIENT
Start: 2024-11-04

## 2024-11-07 RX ORDER — POTASSIUM CHLORIDE 1500 MG/1
TABLET, EXTENDED RELEASE ORAL
Qty: 270 TABLET | Refills: 3 | Status: SHIPPED | OUTPATIENT
Start: 2024-11-07

## 2024-11-07 RX ORDER — HYDROXYCHLOROQUINE SULFATE 200 MG/1
TABLET, FILM COATED ORAL
Qty: 90 TABLET | Refills: 3 | Status: SHIPPED | OUTPATIENT
Start: 2024-11-07

## 2024-11-07 NOTE — TELEPHONE ENCOUNTER
HYDROXYCHLOROQUINE 200 MG TAB     Last Written Prescription Date:  7/8/24   Last Fill Quantity: 90,   # refills: 0     Last Office Visit: 9/19/24  Future Office visit:  1/23/25    Eye exam:9/11/24 OCT/ [facility]: Low concern for plaquenil toxicity causing visual field deficit as resolved with lubrication  Repeat plaquenil screening annually

## 2024-11-07 NOTE — TELEPHONE ENCOUNTER
You will get a call from [employer] to schedule your at-home sleep study in the next 24-48 hours. If you do not hear from [employer] within 48 hours, please call them at [phone].  This device will be mailed to you. If you are on CPAP, you will have to be off of CPAP for 2-3 days prior to your home sleep study. You are not to wear CPAP the nights of your home sleep study. It is a 2 night sleep study and once completed, you will return device to address provided.      A physician will read the study and you will receive a call from our office with results or to schedule a follow-up appointment with the Sleep Center to discuss treatment options.        reviewed, norco refsharmila approved.    Charly Moore [facility] Pain Management

## 2024-11-07 NOTE — TELEPHONE ENCOUNTER
LVD:  9/12/2024  [facility] Archana Allen MD  Potassium   Date Value Ref Range Status   09/12/2024 4.2 3.4 - 5.3 mmol/L Final   08/23/2022 4.1 3.4 - 5.3 mmol/L Final   06/04/2021 4.0 3.4 - 5.3 mmol/L Final      KLOR-CON M20 TABLET  Refilled per protocol.

## 2024-11-17 ENCOUNTER — ANCILLARY PROCEDURE (OUTPATIENT)
Dept: MRI IMAGING | Facility: CLINIC | Age: 84
End: 2024-11-17
Attending: INTERNAL MEDICINE
Payer: COMMERCIAL

## 2024-11-17 DIAGNOSIS — M25.532 WRIST PAIN, LEFT: ICD-10-CM

## 2024-11-17 PROCEDURE — 73221 MRI JOINT UPR EXTREM W/O DYE: CPT | Mod: LT | Performed by: RADIOLOGY

## 2024-11-19 ENCOUNTER — VIRTUAL VISIT (OUTPATIENT)
Dept: PHARMACY | Facility: CLINIC | Age: 84
End: 2024-11-19
Payer: COMMERCIAL

## 2024-11-19 DIAGNOSIS — E11.65 TYPE 2 DIABETES MELLITUS WITH HYPERGLYCEMIA, WITH LONG-TERM CURRENT USE OF INSULIN (H): Primary | ICD-10-CM

## 2024-11-19 DIAGNOSIS — Z79.4 TYPE 2 DIABETES MELLITUS WITH HYPERGLYCEMIA, WITH LONG-TERM CURRENT USE OF INSULIN (H): Primary | ICD-10-CM

## 2024-11-19 PROCEDURE — 99607 MTMS BY PHARM ADDL 15 MIN: CPT | Mod: 95 | Performed by: PHARMACIST

## 2024-11-19 PROCEDURE — 99606 MTMS BY PHARM EST 15 MIN: CPT | Mod: 95 | Performed by: PHARMACIST

## 2024-11-19 RX ORDER — INSULIN GLARGINE 100 [IU]/ML
18 INJECTION, SOLUTION SUBCUTANEOUS AT BEDTIME
Qty: 30 ML | Refills: 0 | Status: SHIPPED | OUTPATIENT
Start: 2024-11-19

## 2024-11-19 RX ORDER — INSULIN LISPRO 100 [IU]/ML
INJECTION, SOLUTION INTRAVENOUS; SUBCUTANEOUS
Qty: 15 ML | Refills: 1 | Status: SHIPPED | OUTPATIENT
Start: 2024-11-19

## 2024-11-19 NOTE — PROGRESS NOTES
Medication Therapy Management (MTM) Encounter    ASSESSMENT:                            Medication Adherence/Access: No issues identified.    Diabetes:  Patient is not meeting A1c goal of < 8%.  Self monitoring of blood glucose is not at goal of fasting  mg/dL and post prandial < 180 mg/dL.  Patient would benefit from increasing both basal and bolus insulin doses. Patient would benefit from a continuous glucose monitor for more comprehensive blood sugar monitoring, but is not interested at this time.     PLAN:                            Increase Lantus to 18 units daily  Increase Novolog (or humalog) sliding scale below   If -200 take 6 units  If -250 take 8 units  If -300 take 10 units  If -350 take 14 units  If BG >350 take 18 units  3. Let us know if you have trouble with low blood sugars or trouble at the pharmacy.     Follow-up: 11/26 at 11:30am    SUBJECTIVE/OBJECTIVE:                          Betty Tee is a 84 year old female contacted via secure video for a follow-up visit. Patient was accompanied by caretaker Nghia for our visit today.      Reason for visit: diabetes follow-up.    Allergies/ADRs: Reviewed in chart  Past Medical History: Reviewed in chart  Tobacco: She reports that she quit smoking about 13 years ago. Her smoking use included cigarettes. She started smoking about 54 years ago. She has a 20.8 pack-year smoking history. She has never used smokeless tobacco.    Medication Adherence/Access: Couldn't get Mounjaro - Medicare wouldn't pay for it even though she was supposed to take it yesterday. She can pick it up today and will take it today.     Diabetes   Mounjaro 7.5 mg weekly (starting today)  Aspirin 81mg daily  Lantus 16 units daily  Novolog (or humalog) sliding scale below - takes with each meal, usually skips lunch so takes with breakfast and dinner. Finishing up supply of Novolog then will switch to Humalog - typically using 6-8 units  If -200 take  4 units  If -250 take 6 units  If -300 take 8 units  If -350 take 12 units  If BG >350 take 16 units    Fasting blood sugars (patient reported): States she is testing 3-4 times/day. Readings are   314 this morning, averaging mostly in the 200s. Readings from the last week: 240, 243, 214, 233, 210, 252, 220  Prior to meals: 284, 443 - didn't write anything else down.   Betty is not interested in CGM.     She notes no side effects from the meds above. She notes she is feeling very hungry about 30 min after eating which has not improved with Mounjaro.  Betty has been working hard on remembering all insulin doses. She confirms she is taking both safety caps off insulin pens.      Eye exam is up to date  Foot exam: due  ----------------  I spent 18 minutes with this patient today. I offer these suggestions for consideration by Dr. Tristan. A copy of the visit note was provided to the patient's provider(s).    A summary of these recommendations was sent via [employer].    Meghan Pacheco, PharmD  Medication Therapy Management Resident  Sabrina Meek, PharmD, JAMES, BCACP   Medication Therapy Management Pharmacist      Telemedicine Visit Details  The patient's medications can be safely assessed via a telemedicine encounter.  Type of service:  Video Conference via [employer]  Originating Location (pt. Location):Home    Distant Location (provider location):  On-site  Joined the call at 11/19/2024, 10:34:55 am.  Left the call at 11/19/2024, 10:52:41 am.  You were on the call for 17 minutes 46 seconds      Medication Therapy Recommendations  Type 2 diabetes mellitus with hyperglycemia, with long-term current use of insulin (H)   1 Current Medication: insulin glargine (LANTUS SOLOSTAR) 100 UNIT/ML pen   Current Medication Sig: INJECT 15 UNITS SUBCUTANEOUS AT BEDTIME   Rationale: Dose too low - Dosage too low - Effectiveness   Recommendation: Increase Dose   Status: Accepted per CPA   Identified Date: 11/19/2024  Completed Date: 11/19/2024         2 Current Medication: insulin lispro (HUMALOG KWIKPEN) 100 UNIT/ML (1 unit dial) KWIKPEN   Current Medication Sig: Inject Subcutaneous 3 times daily (before meals) Sliding scale insulin; tests BG three times day  If BG <150, no insulin given   If -200 take 2 units  If -250 take 4 units  If -300 take 6 units  If -350 take 10 units  If BG >350 take 14 units   Rationale: Dose too low - Dosage too low - Effectiveness   Recommendation: Increase Dose   Status: Accepted per CPA   Identified Date: 11/19/2024 Completed Date: 11/19/2024

## 2024-11-19 NOTE — PATIENT INSTRUCTIONS
"Recommendations from today's MTM visit:                                                    MTM (medication therapy management) is a service provided by a clinical pharmacist designed to help you get the most of out of your medicines.   Today we reviewed what your medicines are for, how to know if they are working, that your medicines are safe and how to make your medicine regimen as easy as possible.      Increase Lantus to 18 units daily  Increase Novolog (or humalog) sliding scale below   If -200 take 6 units  If -250 take 8 units  If -300 take 10 units  If -350 take 14 units  If BG >350 take 18 units  3. Let us know if you have trouble with low blood sugars or trouble at the pharmacy.     Follow-up: 11/26 at 11:30am    It was great speaking with you today.  I value your experience and would be very thankful for your time in providing feedback in our clinic survey. In the next few days, you may receive an email or text message from [employer] with a link to a survey related to your  clinical pharmacist.\"     To schedule another MTM appointment, please call the clinic directly or you may call the MTM scheduling line at [phone] or toll-free at [phone].     My Clinical Pharmacist's contact information:                                                      Please feel free to contact me with any questions or concerns you have.      Meghan Pacheco PharmD  Medication Therapy Management Resident, [facility]  Pager: [phone]  Email: [email or web address]    Sabrina Meek, Pharm.D., M.B.A., [facility]  Medication Therapy Management Pharmacist, [facility]  Pager: [phone]  Email: [email or web address]   "

## 2024-11-25 NOTE — PROGRESS NOTES
Medication Therapy Management (MTM) Encounter    ASSESSMENT:                            Medication Adherence/Access: No issues identified.    Diabetes  Self monitoring of blood glucose is not at goal of fasting  mg/dL and post prandial < 180 mg/dL. Not much change in blood sugars since last insulin adjustment. Patient would benefit from continuation of Mounjaro dose increases and increase in sliding scale insulin.    PLAN:                            We sent a prescription to [facility] for the next dose of Mounjaro. Call them the same day you give your last dose of 7.5 mg to let them know you'll be needing it filled so they have time to order it if needed.  Increase Novolog/Humalog scale:   If -200 take 8 units  If -250 take 10 units  If -300 take 12 units  If -350 take 16 units  If BG >350 take 20 units    Follow-up: 12/10 at 11:30AM for video visit.    SUBJECTIVE/OBJECTIVE:                          Betty Tee is a 84 year old female contacted via secure video for a follow-up visit. Patient was accompanied by caretaker Nghia for our visit today.    Reason for visit: diabetes follow-up.    Allergies/ADRs: Reviewed in chart  Past Medical History: Reviewed in chart  Tobacco: She reports that she quit smoking about 13 years ago. Her smoking use included cigarettes. She started smoking about 54 years ago. She has a 20.8 pack-year smoking history. She has never used smokeless tobacco.    Medication Adherence/Access: Nghia and Betty have concerns that they won't be able to get their Mounjaro on time again with the next dose increase.    Diabetes   Mounjaro 7.5 mg weekly (second dose yesterday)  Aspirin 81mg daily  Lantus 18 units daily  Novolog (or humalog) sliding scale below - takes with each meal, usually skips lunch so takes with breakfast and dinner. Finishing up supply of Novolog then will switch to Humalog - typically using 8-10 units  If -200 take 6 units  If -250 take 8  units  If -300 take 10 units  If -350 take 14 units  If BG >350 take 18 units    Fasting blood sugars (patient reported): States she is testing 3-4 times/day. AM readings from the last week: 220 this morning, 203, 202, 253, 269, 239  Prior to meals:  11/23: 226, 280  11/20: 266, 257  11/19: 338, 183  Betty is not interested in CGM.    She notes no side effects from the meds above. She confirms she is not experiencing low blood sugars.     Eye exam is up to date  Foot exam: due  ----------------  I spent 16 minutes with this patient today. All changes were made via collaborative practice agreement with Ilene Tristan MD. A copy of the visit note was provided to the patient's provider(s).    A summary of these recommendations was sent via [employer].    Meghan Pacheco, MarcosD  Medication Therapy Management Resident  Marcos NicoleD, JAMES, BCACP   Medication Therapy Management Pharmacist      Telemedicine Visit Details  The patient's medications can be safely assessed via a telemedicine encounter.  Type of service:  Video Conference via [employer]  Originating Location (pt. Location): Home    Distant Location (provider location):  On-site  Joined the call at 11/26/2024, 11:32:21 am.  Left the call at 11/26/2024, 11:48:11 am.  You were on the call for 15 minutes 49 seconds .     Medication Therapy Recommendations  No medication therapy recommendations to display

## 2024-11-26 ENCOUNTER — VIRTUAL VISIT (OUTPATIENT)
Dept: PHARMACY | Facility: CLINIC | Age: 84
End: 2024-11-26
Payer: COMMERCIAL

## 2024-11-26 DIAGNOSIS — Z79.4 TYPE 2 DIABETES MELLITUS WITH HYPERGLYCEMIA, WITH LONG-TERM CURRENT USE OF INSULIN (H): ICD-10-CM

## 2024-11-26 DIAGNOSIS — E11.65 TYPE 2 DIABETES MELLITUS WITH HYPERGLYCEMIA, WITH LONG-TERM CURRENT USE OF INSULIN (H): ICD-10-CM

## 2024-11-26 PROCEDURE — 99607 MTMS BY PHARM ADDL 15 MIN: CPT | Mod: 95 | Performed by: PHARMACIST

## 2024-11-26 PROCEDURE — 99606 MTMS BY PHARM EST 15 MIN: CPT | Mod: 95 | Performed by: PHARMACIST

## 2024-11-26 RX ORDER — TIRZEPATIDE 10 MG/.5ML
10 INJECTION, SOLUTION SUBCUTANEOUS
Qty: 2 ML | Refills: 0 | Status: SHIPPED | OUTPATIENT
Start: 2024-11-26

## 2024-11-26 RX ORDER — INSULIN LISPRO 100 [IU]/ML
INJECTION, SOLUTION INTRAVENOUS; SUBCUTANEOUS
Qty: 15 ML | Refills: 1 | Status: SHIPPED | OUTPATIENT
Start: 2024-11-26

## 2024-11-26 NOTE — PATIENT INSTRUCTIONS
"Recommendations from today's MTM visit:                                                    MTM (medication therapy management) is a service provided by a clinical pharmacist designed to help you get the most of out of your medicines.   Today we reviewed what your medicines are for, how to know if they are working, that your medicines are safe and how to make your medicine regimen as easy as possible.      We sent a prescription to [facility] for the next dose of Mounjaro. Call them the same day you give your last dose of 7.5 mg to let them know you'll be needing it filled so they have time to order it if needed.  Increase Novolog/Humalog scale:   If -200 take 8 units  If -250 take 10 units  If -300 take 12 units  If -350 take 16 units  If BG >350 take 20 units    Follow-up: 12/10 at 11:30AM for video visit.    It was great speaking with you today.  I value your experience and would be very thankful for your time in providing feedback in our clinic survey. In the next few days, you may receive an email or text message from [employer] with a link to a survey related to your  clinical pharmacist.\"     To schedule another MTM appointment, please call the clinic directly or you may call the MTM scheduling line at [phone] or toll-free at [phone].     My Clinical Pharmacist's contact information:                                                      Please feel free to contact me with any questions or concerns you have.      Meghan Pacheco, PharmD  Medication Therapy Management Resident, [facility]  Phone: [phone]    Sabrina Meek, Pharm.D., M.B.A., BCACP  Medication Therapy Management Pharmacist, [facility]  Phone: [phone]   "

## 2024-11-27 ENCOUNTER — TELEPHONE (OUTPATIENT)
Dept: RHEUMATOLOGY | Facility: CLINIC | Age: 84
End: 2024-11-27
Payer: COMMERCIAL

## 2024-11-27 NOTE — TELEPHONE ENCOUNTER
----- Message from Zulma Lopez sent at 11/25/2024 12:14 PM CST -----  Some ligament tear and osteoarthritis. Recommend follow up with ortho.

## 2024-11-27 NOTE — TELEPHONE ENCOUNTER
Called Nghia (caregiver) to discuss MRI results of wrist. Her and Betty are aware of results and have plan on 12/18 to see Dr. Connell (ortho hand specialist) outside of [place]. No further questions at this time.       Tamara Kelley RN  [facility]

## 2024-12-08 DIAGNOSIS — E11.65 TYPE 2 DIABETES MELLITUS WITH HYPERGLYCEMIA, WITH LONG-TERM CURRENT USE OF INSULIN (H): ICD-10-CM

## 2024-12-08 DIAGNOSIS — Z79.4 TYPE 2 DIABETES MELLITUS WITH HYPERGLYCEMIA, WITH LONG-TERM CURRENT USE OF INSULIN (H): ICD-10-CM

## 2024-12-09 ENCOUNTER — TELEPHONE (OUTPATIENT)
Dept: FAMILY MEDICINE | Facility: CLINIC | Age: 84
End: 2024-12-09
Payer: COMMERCIAL

## 2024-12-09 RX ORDER — TIRZEPATIDE 7.5 MG/.5ML
INJECTION, SOLUTION SUBCUTANEOUS
OUTPATIENT
Start: 2024-12-09

## 2024-12-09 NOTE — TELEPHONE ENCOUNTER
Called pt in attempt to schedule nurse only for Prolia inj  Left message for patient to call [facility] back    Last Depo administered: 7/08/24  Can be next administered 1/09/25   When pt returns call, please help to schedule a nurse only appt for Prolia injection.    Yvonne GARCIA RN

## 2024-12-10 ENCOUNTER — VIRTUAL VISIT (OUTPATIENT)
Dept: PHARMACY | Facility: CLINIC | Age: 84
End: 2024-12-10
Payer: COMMERCIAL

## 2024-12-10 DIAGNOSIS — E11.65 TYPE 2 DIABETES MELLITUS WITH HYPERGLYCEMIA, WITH LONG-TERM CURRENT USE OF INSULIN (H): ICD-10-CM

## 2024-12-10 DIAGNOSIS — Z79.4 TYPE 2 DIABETES MELLITUS WITH HYPERGLYCEMIA, WITH LONG-TERM CURRENT USE OF INSULIN (H): ICD-10-CM

## 2024-12-10 PROCEDURE — 99606 MTMS BY PHARM EST 15 MIN: CPT | Mod: 95 | Performed by: PHARMACIST

## 2024-12-10 RX ORDER — INSULIN GLARGINE 100 [IU]/ML
20 INJECTION, SOLUTION SUBCUTANEOUS AT BEDTIME
Qty: 30 ML | Refills: 0 | Status: SHIPPED | OUTPATIENT
Start: 2024-12-10

## 2024-12-10 NOTE — PATIENT INSTRUCTIONS
"Recommendations from today's MTM visit:                                                         Increase Lantus to 20 units daily  Continue Novolog/Humalog as you have been - but add 8 units at each meal where you are not able to check your blood sugars.   Start Mounjaro 10mg as planned on Monday.     Follow-up: 12/26 at 11:30am     It was great speaking with you today.  I value your experience and would be very thankful for your time in providing feedback in our clinic survey. In the next few days, you may receive an email or text message from [employer] with a link to a survey related to your  clinical pharmacist.\"     To schedule another MTM appointment, please call the clinic directly or you may call the MTM scheduling line at [phone] or toll-free at [phone].     My Clinical Pharmacist's contact information:                                                      Please feel free to contact me with any questions or concerns you have.      Sabrina Meek, Pharm.D., M.B.A., BCACP  MTM Pharmacist, [facility]  Pager: [phone]        "

## 2024-12-10 NOTE — PROGRESS NOTES
Medication Therapy Management (MTM) Encounter    ASSESSMENT:                            Medication Adherence/Access: See below for considerations.    Diabetes:   Self monitoring of blood glucose is not at goal of fasting  mg/dL and post prandial < 180 mg/dL. Fasting blood sugars closer to goal than at previous visit if she has taken prandial insulin with evening meal, but would still benefit from increase in dose.   Discussed ways to remember insulin when not at home for a meal. She is wiling to bring it with her. Discussed if not able to take her BG to still take 8 units with meals (better to have some insulin, than nothing at all). Also discussed remembering when home - ok to take right after eating if she remembers right away.     PLAN:                            Increase Lantus to 20 units daily  Continue Novolog/Humalog as you have been - but add 8 units at each meal where you are not able to check your blood sugars.   Start Mounjaro 10mg as planned on Monday.     Follow-up: 12/26 at 11:30am     SUBJECTIVE/OBJECTIVE:                          Betty Tee is a 84 year old female seen for a follow-up visit.       Reason for visit: follow-up on blood sugars.    Allergies/ADRs: Reviewed in chart  Past Medical History: Reviewed in chart  Tobacco: She reports that she quit smoking about 13 years ago. Her smoking use included cigarettes. She started smoking about 54 years ago. She has a 20.8 pack-year smoking history. She has never used smokeless tobacco.  Alcohol: not currently using      Medication Adherence/Access: Missing some insulin doses.   -----------------------------------------------------------------------------------------------------------------  Diabetes   Mounjaro 7.5 mg weekly, will start 10mg on Monday.   Aspirin 81mg daily  Lantus 18 units daily  Novolog (or humalog) sliding scale below - takes with each meal, usually skips lunch so takes with breakfast and dinner. Finishing up supply of  Novolog then will switch to Humalog - typically using 8-10 units  If -200 take 8 units  If -250 take 10 units  If -300 take 12 units  If -350 take 16 units  If BG >350 take 20 units    Sometimes she forgets to take her blood sugars prior to eating, and then will not take her insulin.     Betty is not interested in CGM.    She notes no side effects from the meds above. She confirms she is not experiencing low blood sugars.     Fasting blood sugars (patient reported): States she is testing 3-4 times/day. AM readings from the last week: 220 this morning, 203, 202, 253, 269, 239  Prior to meals:  Date - Fasting - noon - dinner  12/2 - 181 12/4 - 185 - 369  12/5 - 162 - 138 - 152  12/6 - 188 - 288  12/7-194 -289  12/8 - 181 - 256  12/9 - 228 - 250 (no insulin with evening meal)  12/10 - 210     ----------------      I spent 15 minutes with this patient today. All changes were made via collaborative practice agreement with Ilene Tristan MD. A copy of the visit note was provided to the patient's provider(s).    A summary of these recommendations was sent via [employer].    Marcos NicoleD, JAMES, BCACP  MTM Pharmacist, [facility]     Telemedicine Visit Details  The patient's medications can be safely assessed via a telemedicine encounter.  Type of service:  Video Conference via [employer]  Originating Location (pt. Location): Home    Distant Location (provider location):  On-site  Joined the call at 12/10/2024, 11:34:36 am.  Left the call at 12/10/2024, 11:49:43 am.  You were on the call for 15 minutes 6 seconds      Medication Therapy Recommendations  Type 2 diabetes mellitus with hyperglycemia, with long-term current use of insulin (H)   1 Current Medication: insulin glargine (LANTUS SOLOSTAR) 100 UNIT/ML pen   Current Medication Sig: Inject 20 Units subcutaneously at bedtime.   Rationale: Dose too low - Dosage too low - Effectiveness   Recommendation: Increase Dose    Status: Accepted per CPA   Identified Date: 12/10/2024 Completed Date: 12/10/2024

## 2024-12-21 ENCOUNTER — HEALTH MAINTENANCE LETTER (OUTPATIENT)
Age: 84
End: 2024-12-21

## 2024-12-26 ENCOUNTER — VIRTUAL VISIT (OUTPATIENT)
Dept: PHARMACY | Facility: CLINIC | Age: 84
End: 2024-12-26
Payer: COMMERCIAL

## 2024-12-26 DIAGNOSIS — E11.65 TYPE 2 DIABETES MELLITUS WITH HYPERGLYCEMIA, WITH LONG-TERM CURRENT USE OF INSULIN (H): Primary | ICD-10-CM

## 2024-12-26 DIAGNOSIS — Z79.4 TYPE 2 DIABETES MELLITUS WITH HYPERGLYCEMIA, WITH LONG-TERM CURRENT USE OF INSULIN (H): Primary | ICD-10-CM

## 2024-12-26 RX ORDER — INSULIN GLARGINE 100 [IU]/ML
24 INJECTION, SOLUTION SUBCUTANEOUS AT BEDTIME
Qty: 30 ML | Refills: 0 | Status: SHIPPED | OUTPATIENT
Start: 2024-12-26

## 2024-12-26 RX ORDER — TIRZEPATIDE 12.5 MG/.5ML
12.5 INJECTION, SOLUTION SUBCUTANEOUS
Qty: 2 ML | Refills: 0 | Status: SHIPPED | OUTPATIENT
Start: 2024-12-26

## 2024-12-26 NOTE — PROGRESS NOTES
Medication Therapy Management (MTM) Encounter    ASSESSMENT:                            Medication Adherence/Access: See below for considerations.    Diabetes   Blood sugars have improved, adherence to insulin improved as well. Still not meeting SMBG goals (nor A1c goal). Would benefit from increase in Mounjaro (to address PP sugars) and Lantus (to address fasting sugars).   Still not interested in CGM.     PLAN:                            Increase Lantus to 24 units daily  Increase Mounjaro to 12.5mg once a week (use up the rest of 10mg first, I believe your first dose of 12.5mg will be on 1/13).  I sent this to [facility] for you today.     Follow-up: With Dr. Tristan 1/7, with MTM on 1/16.     SUBJECTIVE/OBJECTIVE:                          Betty Tee is a 84 year old female seen for a follow-up visit. Patient was accompanied by Yvette.      Reason for visit: Insulin adjustment.    Allergies/ADRs: Reviewed in chart  Past Medical History: Reviewed in chart  Tobacco: She reports that she quit smoking about 13 years ago. Her smoking use included cigarettes. She started smoking about 55 years ago. She has a 20.8 pack-year smoking history. She has never used smokeless tobacco.  Alcohol: not currently using    Medication Adherence/Access: Has done better with taking some insulin even if she doesn't check her blood sugars first (see previous visit). She is not taking insulin in the middle of the day if she skips lunch (affirmed this is correct).   -----------------------------------------------------------------------------------------------------------------  Diabetes   Aspirin 81mg daily  Mounjaro 10mg weekly (first dose on 12/16).   Lantus 20 units daily  Novolog (or humalog) sliding scale below - takes with each meal, usually skips lunch so takes with breakfast and dinner. Finishing up supply of Novolog then will switch to Humalog - typically using 8-10 units  If -200 take 8 units  If -250 take 10 units  If BG  250-300 take 12 units  If -350 take 16 units  If BG >350 take 20 units    She notes no side effects from the meds above. She confirms she is not experiencing low blood sugars.     Blood sugar monitorin-4 time(s) daily; Ranges: (patient reported)    Mornin, 152, 157, 198, 175, 153  Rest of day - 264, 199, 328 (89nd birthday party for a friend), 190, 161, 160, 178.   Eye exam is up to date  Foot exam: due - scheduled with CW on   ----------------      I spent 10 minutes with this patient today. All changes were made via collaborative practice agreement with Ilene Tristan MD. A copy of the visit note was provided to the patient's provider(s).    A summary of these recommendations was sent via [employer].    Sabrina Meek, MarcosD, JAMES, BCACP  MTM Pharmacist, [facility]     Telemedicine Visit Details  The patient's medications can be safely assessed via a telemedicine encounter.  Type of service:  Telephone visit  Originating Location (pt. Location): Home    Distant Location (provider location):  On-site  Start Time:  11:40 AM  End Time:  11:50 AM     Medication Therapy Recommendations  Type 2 diabetes mellitus with hyperglycemia, with long-term current use of insulin (H)   1 Current Medication: MOUNJARO 10 MG/0.5ML SOAJ (Discontinued)   Current Medication Sig: Inject 0.5 mLs (10 mg) subcutaneously every 7 days.   Rationale: Dose too low - Dosage too low - Effectiveness   Recommendation: Increase Dose - Mounjaro 12.5 MG/0.5ML Soaj   Status: Accepted per CPA   Identified Date: 2024 Completed Date: 2024         2 Current Medication: insulin glargine (LANTUS SOLOSTAR) 100 UNIT/ML pen   Current Medication Sig: Inject 24 Units subcutaneously at bedtime.   Rationale: Dose too low - Dosage too low - Effectiveness   Recommendation: Increase Dose   Status: Accepted per CPA   Identified Date: 2024 Completed Date: 2024

## 2024-12-26 NOTE — PATIENT INSTRUCTIONS
"Recommendations from today's MTM visit:                                                    MTM (medication therapy management) is a service provided by a clinical pharmacist designed to help you get the most of out of your medicines.   Today we reviewed what your medicines are for, how to know if they are working, that your medicines are safe and how to make your medicine regimen as easy as possible.      Increase Lantus to 24 units daily  Increase Mounjaro to 12.5mg once a week (use up the rest of 10mg first, I believe your first dose of 12.5mg will be on 1/13).  I sent this to [facility] for you today.     Follow-up: With Dr. Tristan 1/7, with MTM on 1/16.     It was great speaking with you today.  I value your experience and would be very thankful for your time in providing feedback in our clinic survey. In the next few days, you may receive an email or text message from [employer] with a link to a survey related to your  clinical pharmacist.\"     To schedule another MTM appointment, please call the clinic directly or you may call the MTM scheduling line at [phone] or toll-free at [phone].     My Clinical Pharmacist's contact information:                                                      Please feel free to contact me with any questions or concerns you have.      Sabrina Meek, Pharm.D., M.B.A., [facility]  MTM Pharmacist, [facility]  Pager: [phone]      "

## 2024-12-31 DIAGNOSIS — N39.41 URGE INCONTINENCE: ICD-10-CM

## 2025-01-05 DIAGNOSIS — M10.9 ACUTE GOUT OF FOOT, UNSPECIFIED CAUSE, UNSPECIFIED LATERALITY: ICD-10-CM

## 2025-01-05 DIAGNOSIS — E11.65 TYPE 2 DIABETES MELLITUS WITH HYPERGLYCEMIA, WITH LONG-TERM CURRENT USE OF INSULIN (H): Primary | ICD-10-CM

## 2025-01-05 DIAGNOSIS — J30.1 SEASONAL ALLERGIC RHINITIS DUE TO POLLEN: ICD-10-CM

## 2025-01-05 DIAGNOSIS — Z79.4 TYPE 2 DIABETES MELLITUS WITH HYPERGLYCEMIA, WITH LONG-TERM CURRENT USE OF INSULIN (H): Primary | ICD-10-CM

## 2025-01-06 RX ORDER — MIRABEGRON 25 MG/1
25 TABLET, FILM COATED, EXTENDED RELEASE ORAL DAILY
Qty: 30 TABLET | Refills: 1 | Status: SHIPPED | OUTPATIENT
Start: 2025-01-06

## 2025-01-06 RX ORDER — LORATADINE 10 MG/1
TABLET ORAL
Qty: 90 TABLET | Refills: 2 | Status: SHIPPED | OUTPATIENT
Start: 2025-01-06

## 2025-01-06 NOTE — TELEPHONE ENCOUNTER
Loratidine refills sent.    Will also order A1C to hopefully be done prior to appt tomorrow.  Appt notes updated to do **lab first**.    Thanks,  CW

## 2025-01-07 ENCOUNTER — TELEPHONE (OUTPATIENT)
Dept: FAMILY MEDICINE | Facility: CLINIC | Age: 85
End: 2025-01-07

## 2025-01-07 ENCOUNTER — OFFICE VISIT (OUTPATIENT)
Dept: FAMILY MEDICINE | Facility: CLINIC | Age: 85
End: 2025-01-07
Payer: COMMERCIAL

## 2025-01-07 VITALS
BODY MASS INDEX: 29.62 KG/M2 | SYSTOLIC BLOOD PRESSURE: 118 MMHG | TEMPERATURE: 97.9 F | HEART RATE: 74 BPM | OXYGEN SATURATION: 98 % | DIASTOLIC BLOOD PRESSURE: 58 MMHG | WEIGHT: 178 LBS | RESPIRATION RATE: 19 BRPM

## 2025-01-07 DIAGNOSIS — M81.0 AGE-RELATED OSTEOPOROSIS WITHOUT CURRENT PATHOLOGICAL FRACTURE: ICD-10-CM

## 2025-01-07 DIAGNOSIS — I50.32 CHRONIC HEART FAILURE WITH PRESERVED EJECTION FRACTION (H): ICD-10-CM

## 2025-01-07 DIAGNOSIS — I48.0 PAROXYSMAL ATRIAL FIBRILLATION (H): ICD-10-CM

## 2025-01-07 DIAGNOSIS — M10.9 GOUT, UNSPECIFIED CAUSE, UNSPECIFIED CHRONICITY, UNSPECIFIED SITE: ICD-10-CM

## 2025-01-07 DIAGNOSIS — M19.042 DEGENERATIVE ARTHRITIS OF METACARPOPHALANGEAL JOINT OF LEFT THUMB: ICD-10-CM

## 2025-01-07 DIAGNOSIS — Z79.4 TYPE 2 DIABETES MELLITUS WITH HYPERGLYCEMIA, WITH LONG-TERM CURRENT USE OF INSULIN (H): Primary | ICD-10-CM

## 2025-01-07 DIAGNOSIS — E78.5 HYPERLIPIDEMIA LDL GOAL <100: ICD-10-CM

## 2025-01-07 DIAGNOSIS — N18.31 STAGE 3A CHRONIC KIDNEY DISEASE (H): ICD-10-CM

## 2025-01-07 DIAGNOSIS — G47.00 INSOMNIA, UNSPECIFIED TYPE: ICD-10-CM

## 2025-01-07 DIAGNOSIS — E11.65 TYPE 2 DIABETES MELLITUS WITH HYPERGLYCEMIA, WITH LONG-TERM CURRENT USE OF INSULIN (H): Primary | ICD-10-CM

## 2025-01-07 DIAGNOSIS — J30.1 SEASONAL ALLERGIC RHINITIS DUE TO POLLEN: ICD-10-CM

## 2025-01-07 DIAGNOSIS — J84.9 ILD (INTERSTITIAL LUNG DISEASE) (H): ICD-10-CM

## 2025-01-07 DIAGNOSIS — F33.1 MAJOR DEPRESSIVE DISORDER, RECURRENT EPISODE, MODERATE (H): ICD-10-CM

## 2025-01-07 DIAGNOSIS — M06.00 SERONEGATIVE RHEUMATOID ARTHRITIS (H): ICD-10-CM

## 2025-01-07 DIAGNOSIS — R41.3 MEMORY CHANGES: ICD-10-CM

## 2025-01-07 DIAGNOSIS — I10 ESSENTIAL HYPERTENSION WITH GOAL BLOOD PRESSURE LESS THAN 140/90: ICD-10-CM

## 2025-01-07 LAB
EST. AVERAGE GLUCOSE BLD GHB EST-MCNC: 197 MG/DL
HBA1C MFR BLD: 8.5 % (ref 0–5.6)

## 2025-01-07 PROCEDURE — 36415 COLL VENOUS BLD VENIPUNCTURE: CPT | Performed by: FAMILY MEDICINE

## 2025-01-07 PROCEDURE — 99417 PROLNG OP E/M EACH 15 MIN: CPT | Performed by: FAMILY MEDICINE

## 2025-01-07 PROCEDURE — 83036 HEMOGLOBIN GLYCOSYLATED A1C: CPT | Performed by: FAMILY MEDICINE

## 2025-01-07 PROCEDURE — 99215 OFFICE O/P EST HI 40 MIN: CPT | Performed by: FAMILY MEDICINE

## 2025-01-07 RX ORDER — TRAZODONE HYDROCHLORIDE 50 MG/1
50-100 TABLET, FILM COATED ORAL AT BEDTIME
Qty: 90 TABLET | Refills: 0 | Status: SHIPPED | OUTPATIENT
Start: 2025-01-07

## 2025-01-07 ASSESSMENT — ANXIETY QUESTIONNAIRES
5. BEING SO RESTLESS THAT IT IS HARD TO SIT STILL: NOT AT ALL
4. TROUBLE RELAXING: SEVERAL DAYS
GAD7 TOTAL SCORE: 3
GAD7 TOTAL SCORE: 3
3. WORRYING TOO MUCH ABOUT DIFFERENT THINGS: SEVERAL DAYS
8. IF YOU CHECKED OFF ANY PROBLEMS, HOW DIFFICULT HAVE THESE MADE IT FOR YOU TO DO YOUR WORK, TAKE CARE OF THINGS AT HOME, OR GET ALONG WITH OTHER PEOPLE?: SOMEWHAT DIFFICULT
IF YOU CHECKED OFF ANY PROBLEMS ON THIS QUESTIONNAIRE, HOW DIFFICULT HAVE THESE PROBLEMS MADE IT FOR YOU TO DO YOUR WORK, TAKE CARE OF THINGS AT HOME, OR GET ALONG WITH OTHER PEOPLE: SOMEWHAT DIFFICULT
7. FEELING AFRAID AS IF SOMETHING AWFUL MIGHT HAPPEN: NOT AT ALL
6. BECOMING EASILY ANNOYED OR IRRITABLE: SEVERAL DAYS
7. FEELING AFRAID AS IF SOMETHING AWFUL MIGHT HAPPEN: NOT AT ALL
GAD7 TOTAL SCORE: 3
1. FEELING NERVOUS, ANXIOUS, OR ON EDGE: NOT AT ALL
2. NOT BEING ABLE TO STOP OR CONTROL WORRYING: NOT AT ALL

## 2025-01-07 ASSESSMENT — PATIENT HEALTH QUESTIONNAIRE - PHQ9
10. IF YOU CHECKED OFF ANY PROBLEMS, HOW DIFFICULT HAVE THESE PROBLEMS MADE IT FOR YOU TO DO YOUR WORK, TAKE CARE OF THINGS AT HOME, OR GET ALONG WITH OTHER PEOPLE: SOMEWHAT DIFFICULT
SUM OF ALL RESPONSES TO PHQ QUESTIONS 1-9: 4
SUM OF ALL RESPONSES TO PHQ QUESTIONS 1-9: 4

## 2025-01-07 ASSESSMENT — PAIN SCALES - GENERAL: PAINLEVEL_OUTOF10: NO PAIN (0)

## 2025-01-07 NOTE — PROGRESS NOTES
Assessment & Plan     Type 2 diabetes mellitus with hyperglycemia, with long-term current use of insulin (H)  A1c much improved - back down to 8.5, closer to her usual baseline levels, down from 10.9 at last check.  Working with MTM, back on insulin, and switched from ozempic to mounjaro ~10/24.  She is tolerating the change well, though wishes she would lose more weight (is down 20-30 lbs from a few yrs ago).  Just about to increase mounjaro dose to 12.5mg on Monday, hopefully will see a further drop in her A1c and may see a bit more weight loss.  Rec follow-up in ~4 months with fasting labs and A1C prior to appt.  Continue follow-up with MTM as well.  - Hemoglobin A1c  - Comprehensive metabolic panel (BMP + Alb, Alk Phos, ALT, AST, Total. Bili, TP); Future  - Albumin Random Urine Quantitative with Creat Ratio; Future  - TSH with free T4 reflex; Future    Essential hypertension with goal blood pressure less than 140/90/CKD  Blood pressure in good control on current medication/s.    No side effects. Labs ordered for next time.  Will continue current meds, refills sent.  Continue every six month follow-up.    - Comprehensive metabolic panel (BMP + Alb, Alk Phos, ALT, AST, Total. Bili, TP); Future  - Albumin Random Urine Quantitative with Creat Ratio; Future    Hyperlipidemia LDL goal <100  Due for fasting labs prior to next appt.  - Lipid panel reflex to direct LDL Fasting; Future  - Comprehensive metabolic panel (BMP + Alb, Alk Phos, ALT, AST, Total. Bili, TP); Future    Memory changes  Pt has noticed increase in memory lapses, short term, forgets meeting discussions, forgot about a gift she just received.  Wants to go on medication from ads.  5-CIT 0/28 today.  Offered neuropsych testing, she declines, but would be interested in discussing medication/txt options with neurology. Referral placed.  - Adult Neurology  Referral; Future    MDD/Insomnia, unspecified type  Mood/anxiety symptoms under fair  control with lexparo 20mg/d.    She's been able to lower her trazodone dose from 150mg/d at night to 50-100mg, and sometimes is not taking any.  Agree with trying to continue to taper down/off.  No side effects.  Refills sent.  Continue every six month follow-up.   - traZODone (DESYREL) 50 MG tablet; Take 1-2 tablets ( mg) by mouth at bedtime.    Seasonal allergic rhinitis due to pollen  Reviewed meds- on four (claritin, xyzal, flonase singulair), but she feels nasal/sneezing issues are still concerning, would not want to try weaning off any of them.      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  MRI helpful to dx issues (tendon injury and severe MCP arthritis).  Improved sx's after recent injection with Dr. Krause.     Gout, unspecified cause, unspecified chronicity, unspecified site  Gout flare in hand a few months ago, high dose prednisone course helped.  Continues to follow-up with Dr. Lopez for this, RA and Sjogrens.    Age-related osteoporosis without current pathological fracture  MTM managing, on prolia since ~10/24, tolerating it well.  Couple days to early for next dose- unable to do today.    Chronic heart failure with preserved ejection fraction (H)  Stable sx's. Continue follow-up with cardiology    ILD (interstitial lung disease) (H)  Stable sx's. Continue follow-up pulmonary.      Blood sugar testing frequency justification:  Uncontrolled diabetes, Adjustment of medication(s), and - on insulin       Follow-up Visit   Expected date:  May 07, 2025 (Approximate)      Follow Up Appointment Details:     Follow-up with whom?: Me    Follow-Up for what?: Chronic Disease f/u    Chronic Disease f/u:  Anxiety  Depression  General (Other)       How?: In Person                 I spent a total of 65 minutes on the day of the visit.   Time spent by me today doing chart review, history and exam, documentation and further activities per the  note          Subjective   Betty is a 84 year old, presenting for the following health issues:  Diabetes        1/7/2025     2:54 PM   Additional Questions   Roomed by RODERICK VILLAFANA   Accompanied by MIKAEL         1/7/2025     2:54 PM   Patient Reported Additional Medications   Patient reports taking the following new medications N/A     History of Present Illness       Diabetes:   She presents for follow up of diabetes.  She is checking home blood glucose three times daily.   She checks blood glucose before meals.  Blood glucose is sometimes over 200 and never under 70. She is aware of hypoglycemia symptoms including shakiness, dizziness and weakness.   She is concerned about blood sugar frequently over 200 and other.   She is having numbness in feet, burning in feet and excessive thirst.            She eats 0-1 servings of fruits and vegetables daily.She consumes 0 sweetened beverage(s) daily.She exercises with enough effort to increase her heart rate 9 or less minutes per day.  She exercises with enough effort to increase her heart rate 3 or less days per week. She is missing 1 dose(s) of medications per week.  She is not taking prescribed medications regularly due to remembering to take.       DM II- Following closely with MTM- next appt in a couple weeks.  Switched to mounjaro - reaming up dose, will go to the 12.5mg on Monday.  Tolerating it well.    HTN - BP in good control.      Allergies-  Loratidine, levocetririzine, flonase, singulair (also on BREO through pulmonary)  Nose drips a lot, post-nasal drip, sneezes.  All year round.  New apartment- feels like they need to change filters.      Updates/concerns-   Hand doctor- Dr. Krause, got injections, long journey.  MRI- scapholunate ligament with tearing.  Severe osteoarthrosis of left first MCP.  Got an injection, and thinks it helped.  Return if not helping.      She's also had gout in that left hand- Dr. Rush, 8/15/24.  [facility].  Prednisone from [facility] initially,  but was not helping.  Saw Dr. Lopez on 8/23/24, and increased prednisone course dose, and that helped.  RA/Sjogrens- continues to follow-up with Dr. Lopez.      Pulm- 1/25 consult coming up.  Cardiology- consult w/ heart team coming up.    Osteoporosis- prolia - next dose can be done in the next couple days- too early to do today.        Memory issues- short term memory, getting bad.  Trouble with names.  Had forgotten a gift she had just gotten.    Friend- told her about a meeting, she's forgot about it the next few days.  Noticed it the last several months.  Knows she's under a ton of stress taking care of Yvette.      Six Item Cognitive Impairment Test   (6CIT):    What year is it?                               Correct - 0 points  What month is it?                               Correct - 0 points    Give the patient an address to remember with five components:   Siddharth Swenson ( first and last name - 2 components)   [address]  (number and name of street - 2 components)   [place] ( city - 1 component)    About what time is it (within the hour)? Correct - 0 points  Count backwards from 20 to 1:   Correct - 0 points  Say the months of the year in reverse: Correct - 0 points  Repeat the address phrase:   Correct - 0 points    Total 6CIT Score:      0/28    Interpretation: The 6CIT uses an inverse score and questions are weighted to produce a total out of 28. Scores of 0-7 are considered normal and 8 or more significant.    Advantages The test has high sensitivity without compromising specificity even in mild dementia. It is easy to translate linguistically and culturally.  Disadvantages The main disadvantage is in the scoring and weighting of the test, which is initially confusing, however computer models have simplified this greatly.    Probability Statistics: At the 7/8 cut off: Overall figures sensitivity 90% specificity 100%, in mild dementia sensitivity = 78% , specificity = 100%    Copyright 2000 The  [facility], [facility]. Courtesy of Dr. Thomas Gandhi        Review of Systems  Constitutional, neuro, ENT, endocrine, pulmonary, cardiac, gastrointestinal, genitourinary, musculoskeletal, integument and psychiatric systems are negative, except as otherwise noted.        Objective    /58 (BP Location: Left arm, Patient Position: Sitting, Cuff Size: Adult Regular)   Pulse 74   Temp 97.9  F (36.6  C) (Temporal)   Resp 19   Wt 80.7 kg (178 lb)   LMP  (LMP Unknown)   SpO2 98%   BMI 29.62 kg/m    Body mass index is 29.62 kg/m .  Physical Exam   GENERAL: alert and no distress  NECK: no adenopathy, no asymmetry, masses, or scars  RESP: lungs clear to auscultation - no rales, rhonchi or wheezes  CV: regular rate and rhythm, normal S1 S2, no S3 or S4, no murmur, click or rub, no peripheral edema  ABDOMEN: soft, nontender, no hepatosplenomegaly, no masses and bowel sounds normal  MS: no gross musculoskeletal defects noted, no edema        Office Visit on 09/18/2024   Component Date Value Ref Range Status    Estimated Average Glucose 09/18/2024 252 (H)  <117 mg/dL Final    Hemoglobin A1C 09/18/2024 10.4 (H)  0.0 - 5.6 % Final    Normal <5.7%   Prediabetes 5.7-6.4%    Diabetes 6.5% or higher     Note: Adopted from ADA consensus guidelines.     Results for orders placed or performed in visit on 01/07/25   Hemoglobin A1c     Status: Abnormal   Result Value Ref Range    Estimated Average Glucose 197 (H) <117 mg/dL    Hemoglobin A1C 8.5 (H) 0.0 - 5.6 %           Signed Electronically by: Ilene Tristan MD

## 2025-01-07 NOTE — TELEPHONE ENCOUNTER
Patient updated  Patient will schedule nurse only appointment after 1/9/25  Thanks,  Elise RIVERA RN

## 2025-01-07 NOTE — TELEPHONE ENCOUNTER
[facility] Team,     Patient plans to receive Prolia injection after 1/9/25  Appears 6/11/24 prior authorization states: PEND  Pending [employer]-Do not Proceed     Please advise if approved to administer    Thanks,  Elise RIVERA RN

## 2025-01-09 RX ORDER — ALLOPURINOL 100 MG/1
100 TABLET ORAL DAILY
Qty: 90 TABLET | Refills: 1 | Status: SHIPPED | OUTPATIENT
Start: 2025-01-09

## 2025-01-09 NOTE — TELEPHONE ENCOUNTER
Medication Requested:   ALLOPURINOL 100 MG TABLET       Last Written Prescription Date:  7-8-24  Last Fill Quantity: 90,   # refills: 1  ----------------------  Last Office Visit : 9-19-24  Future Office visit:  1-23-25  ----------------------    9-12-24  GFR Estimate  >60 mL/min/1.73m2 58 Low          Refill decision: Refill pended and routed to the provider for review/determination due to the following criteria not met:   ABN GFR          Fail Protocol Criteria:    Gout Agents Protocol [id number]/05/2025 07:36 AM   Protocol Details Has GFR on file in past 12 months and most recent value is normal    CBC on file in past 12 months    ALT on file in past 12 months    Has Uric Acid on file in past 12 months and value is less than 6    Medication is active on med list    Medication indicated for associated diagnosis    Recent (12 mo) or future (90 days) visit within the authorizing provider's specialty    Patient is age 18 or older    No active pregnancy on record    No positive pregnancy test in past year

## 2025-01-16 ENCOUNTER — VIRTUAL VISIT (OUTPATIENT)
Dept: PHARMACY | Facility: CLINIC | Age: 85
End: 2025-01-16
Payer: COMMERCIAL

## 2025-01-16 DIAGNOSIS — I10 ESSENTIAL HYPERTENSION WITH GOAL BLOOD PRESSURE LESS THAN 140/90: ICD-10-CM

## 2025-01-16 DIAGNOSIS — I50.32 CHRONIC HEART FAILURE WITH PRESERVED EJECTION FRACTION (H): ICD-10-CM

## 2025-01-16 DIAGNOSIS — J30.1 SEASONAL ALLERGIC RHINITIS DUE TO POLLEN: ICD-10-CM

## 2025-01-16 DIAGNOSIS — E11.65 TYPE 2 DIABETES MELLITUS WITH HYPERGLYCEMIA, WITH LONG-TERM CURRENT USE OF INSULIN (H): Primary | ICD-10-CM

## 2025-01-16 DIAGNOSIS — Z79.4 TYPE 2 DIABETES MELLITUS WITH HYPERGLYCEMIA, WITH LONG-TERM CURRENT USE OF INSULIN (H): Primary | ICD-10-CM

## 2025-01-16 DIAGNOSIS — E78.5 HYPERLIPIDEMIA LDL GOAL <100: ICD-10-CM

## 2025-01-16 DIAGNOSIS — I48.0 PAROXYSMAL ATRIAL FIBRILLATION (H): ICD-10-CM

## 2025-01-16 NOTE — PATIENT INSTRUCTIONS
"Recommendations from today's MTM visit:                                                    MTM (medication therapy management) is a service provided by a clinical pharmacist designed to help you get the most of out of your medicines.   Today we reviewed what your medicines are for, how to know if they are working, that your medicines are safe and how to make your medicine regimen as easy as possible.      Continue your current medications  Send Dr. Tristan a Dry Lube[employer] message about your legs, she may ask you to come in and see her    Follow-up: 2/4 at 10AM for a video visit    It was great speaking with you today.  I value your experience and would be very thankful for your time in providing feedback in our clinic survey. In the next few days, you may receive an email or text message from [employer] with a link to a survey related to your  clinical pharmacist.\"     To schedule another MTM appointment, please call the clinic directly or you may call the MTM scheduling line at [phone] or toll-free at [phone].     My Clinical Pharmacist's contact information:                                                      Please feel free to contact me with any questions or concerns you have.      Meghan Pacheco, PharmD  Medication Therapy Management Resident, [facility] and [facility]  Phone: [phone]    Sabrina Meek, Pharm.D., M.B.A., [facility]  Medication Therapy Management Pharmacist, [facility]  Phone: [phone]   "

## 2025-01-16 NOTE — LETTER
January 16, 2025  Betty Tee  [address]   [place] [address]    Dear MARC [facility]     Thank you for talking with me on Jan 16, 2025 about your health and medications. As a follow-up to our conversation, I have included two documents:      Your Recommended To-Do List has steps you should take to get the best results from your medications.  Your Medication List will help you keep track of your medications and how to take them.    If you want to talk about these documents, please call Meghan Pacheco RPH at phone: [phone], Monday-Friday 8-4:30pm.    I look forward to working with you and your doctors to make sure your medications work well for you.    Sincerely,  Meghan Pacheco RPH  [facility] Pharmacist, [facility]

## 2025-01-16 NOTE — LETTER
"Recommended To-Do List      Prepared on: Jan 16, 2025       You can get the best results from your medications by completing the items on this \"To-Do List.\"      Bring your To-Do List when you go to your doctor. And, share it with your family or caregivers.    My To-Do List:  What we talked about: What I should do:   What my medicines are for, how to know if my medicines are working, made sure my medicines are safe for me and reviewed how to take my medicines.     Take my medicines every day                " n/a

## 2025-01-16 NOTE — LETTER
_  Medication List        Prepared on: Jan 16, 2025     Bring your Medication List when you go to the doctor, hospital, or   emergency room. And, share it with your family or caregivers.     Note any changes to how you take your medications.  Cross out medications when you no longer use them.    Medication How I take it Why I use it Prescriber   acyclovir (ZOVIRAX) 400 MG tablet Take 1 tablet (400 mg) by mouth every 8 hours For a couple days Recurrent Cold Sores Ilene Tristan MD   acyclovir (ZOVIRAX) 5 % external ointment Apply topically as needed (6 times day PRN for outbreaks) As needed for outbreaks Recurrent Cold Sores Ilene Tristan MD   albuterol (PROAIR HFA/PROVENTIL HFA/VENTOLIN HFA) 108 (90 Base) MCG/ACT inhaler Inhale 2 puffs into the lungs every 6 hours as needed for wheezing or shortness of breath ILD (Interstitial Lung Disease) (H) Edmundo Rawls MD   allopurinol (ZYLOPRIM) 100 MG tablet TAKE 1 TABLET BY MOUTH EVERY DAY Acute gout of foot, unspecified cause, unspecified laterality Zulma Lopez MD   anakinra (KINERET) 100 MG/0.67ML SOSY injection Inject 0.67 mLs (100 mg) subcutaneously daily. Gout, unspecified cause, unspecified chronicity, unspecified site Zulma Lopez MD   aspirin (ASA) 81 MG EC tablet Take 1 tablet (81 mg) by mouth 2 times daily Primary osteoarthritis of right hip July Silvestre, LIZY CNS   atorvastatin (LIPITOR) 10 MG tablet Take 0.5 tablets (5 mg) by mouth daily Hyperlipidemia LDL Goal <100 Radha Rosa MD   azithromycin (ZITHROMAX) 250 MG tablet TAKE 1 TABLET BY MOUTH EVERY DAY Follicular Bronchiolitis (H) Maryjane Tillman MD   blood glucose (ACCU-CHEK SHANNON PLUS) test strip USE TO TEST BLOOD SUGARS 3 TIMES DAILY Type 2 diabetes mellitus without complication, without long-term current use of insulin (H) Ilene Tristan MD   blood glucose (NO BRAND SPECIFIED) lancets standard Use to test blood sugar 3 times daily or as directed Type 2  diabetes mellitus with hyperglycemia, with long-term current use of insulin (H) Ilene Tristan MD   BREO ELLIPTA 100-25 MCG/ACT inhaler TAKE 1 PUFF BY MOUTH EVERY DAY Follicular Bronchiolitis (H) Maryjane Tillman MD   cevimeline (EVOXAC) 30 MG capsule Take 1 capsule (30 mg) by mouth 3 times daily as needed Sjogren's syndrome with lung involvement (H) Zulma Lopez MD   Cholecalciferol (VITAMIN D3) 50 MCG (2000 UT) CAPS TAKE 100 MCG BY MOUTH DAILY (TAKE 2 TABLET (50 MCG) BY MOUTH DAILY - ORAL) Vitamin D Deficiency Ilene Tristan MD   COMPOUNDED NON-CONTROLLED SUBSTANCE (CMPD RX) - PHARMACY TO MIX COMPOUNDED MEDICATION Estriol 1 mg/g Apply small amount to finger and apply to inside vagina daily for 2 weeks then twice weekly Route: vaginally Dispense 30 grams 11 refills Genitourinary syndrome of menopause Ilene Tristan MD   cyanocobalamin (VITAMIN B-12) 1000 MCG tablet Take 1 tablet (1,000 mcg) by mouth three times a week Vitamin B12 Deficiency (Non Anemic) Edmundo Rawls MD   denosumab (PROLIA) injection 60 mg  Receive injection in clinic every 6 months Age-related osteoporosis without current pathological fracture; History of bisphosphonate therapy Ilene Tristan MD   escitalopram (LEXAPRO) 20 MG tablet TAKE 1 TABLET BY MOUTH EVERY DAY Major Depressive Disorder, Recurrent Episode, Moderate (H) Ilene Tristan MD   fluticasone (FLONASE) 50 MCG/ACT nasal spray SPRAY 1-2 SPRAYS INTO BOTH NOSTRILS DAILY AS NEEDED FOR ALLERGIES Seasonal allergic rhinitis due to pollen Maryjane Tillman MD   hydroxychloroquine (PLAQUENIL) 200 MG tablet TAKE 1 TABLET (200 MG) BY MOUTH DAILY GET ANNUAL EYE EXAMS FOR MONITORING AND FAX [phone] Sjogren's syndrome with lung involvement (H) Zulma Lopez MD   insulin glargine (LANTUS SOLOSTAR) 100 UNIT/ML pen Inject 24 Units subcutaneously at bedtime. Type 2 diabetes mellitus with hyperglycemia, with long-term current use of insulin (H)  Ileen Tristan MD   insulin lispro (HUMALOG KWIKPEN) 100 UNIT/ML (1 unit dial) KWIKPEN Sliding scale insulin; tests BG three times day If BG <150, no insulin given If -200 take 8 units If -250 take 10 units If -300 take 12 units If -350 take 16 units If BG >350 take 20 units Type 2 diabetes mellitus with hyperglycemia, with long-term current use of insulin (H) Ilene Tristan MD   insulin pen needle (BD PEN NEEDLE JANE 2ND GEN) 32G X 4 MM miscellaneous USE TO TEST 4 TIMES A DAY Type 2 diabetes mellitus without complication, without long-term current use of insulin (H) Ilene Tristan MD   ketoconazole (NIZORAL) 2 % external cream Apply topically 2 times daily as needed for itching Cutaneous Candidiasis Ilene Tristan MD   levocetirizine (XYZAL) 5 MG tablet TAKE 1 TABLET BY MOUTH EVERY DAY IN THE EVENING Seasonal allergic rhinitis due to pollen Joel Daniel Irwin Wegener, MD   lidocaine (LIDODERM) 5 % patch APPLY PATCH TO PAINFUL AREA FOR UP TO 12 H WITHIN A 24 H PERIOD. REMOVE AFTER 12 HOURS. Sacral Back Pain Ilene Tristan MD   loratadine (CLARITIN) 10 MG tablet TAKE 1 TABLET BY MOUTH EVERY DAY Seasonal allergic rhinitis due to pollen Ilene Tristan MD   methocarbamol (ROBAXIN) 750 MG tablet Take 1 tablet (750 mg) by mouth 3 times daily as needed for muscle spasms Other Chronic Pain; Piriformis syndrome, right Charly Rafiq Moore MD   metoprolol succinate ER (TOPROL XL) 50 MG 24 hr tablet Take 1 tablet (50 mg) by mouth 2 times daily. Persistent Atrial Fibrillation (H); Hyperlipidemia, unspecified hyperlipidemia type Radha Rosa MD   mirabegron (MYRBETRIQ) 25 MG 24 hr tablet Take 1 tablet (25 mg) by mouth daily. For additional refills, please schedule a follow-up appointment. Urge Incontinence Vale Nassar MD   montelukast (SINGULAIR) 10 MG tablet TAKE 1 TABLET BY MOUTH EVERYDAY AT BEDTIME Sjogren's syndrome with lung  involvement (H); ILD (Interstitial Lung Disease) (H) Ilene Tristan MD   MOUNJARO 12.5 MG/0.5ML SOAJ Inject 0.5 mLs (12.5 mg) subcutaneously every 7 days. Type 2 diabetes mellitus with hyperglycemia, with long-term current use of insulin (H) Ilene Tristan MD   oxyCODONE-acetaminophen (PERCOCET) 5-325 MG tablet Take 1 tablet by mouth every 8 hours as needed for pain. Other secondary acute gout of left hand Zulma Lopez MD   pantoprazole (PROTONIX) 40 MG EC tablet Take 1 tablet (40 mg) by mouth daily (replaces famotidine- should stop taking famotidine) Gastroesophageal Reflux Disease without Esophagitis; History of lower GI bleeding Ilene Tristan MD   polyethylene glycol (MIRALAX) 17 g packet Take 17 g by mouth daily Primary osteoarthritis of right hip LIZY Arvizu   potassium chloride ashwini ER (KLOR-CON M20) 20 MEQ CR tablet TAKE 2 TABLETS (40 MEQ) BY MOUTH EVERY MORNING AND 1 TABLET (20 MEQ) EVERY EVENING. (HFpEF) heart failure with preserved ejection fraction (H) Radha Rosa MD   predniSONE (DELTASONE) 5 MG tablet Take 1 tablet (5 mg) by mouth daily. Acute gout of left foot, unspecified cause; Seronegative Rheumatoid Arthritis (H) Zulma Lopez MD   torsemide (DEMADEX) 20 MG tablet TAKE 2 TABLETS (40 MG) BY MOUTH EVERY MORNING AND 1 TABLET (20 MG) DAILY AT 2 PM. TAKE THE AFTERNOON DOSE WITH AN EXTRA 10 MG TAB FOR A TOTAL OF 30 MG IN THE AFTERNOON Chronic heart failure with preserved ejection fraction (H) Radha Rosa MD   traZODone (DESYREL) 50 MG tablet Take 1-2 tablets ( mg) by mouth at bedtime. Insomnia, unspecified type Ilene Tristan MD         Add new medications, over-the-counter drugs, herbals, vitamins, or  minerals in the blank rows below.    Medication How I take it Why I use it Prescriber                                      Allergies:      - Amoxicillin-pot Clavulanate - Nausea and Vomiting  - Amoxicillin-pot  Clavulanate  - Codeine - Nausea and Vomiting  - Penicillins - Nausea and Vomiting  - Phenobarbital - Itching  - Seasonal Allergies        Side effects I have had:      Not on File        Other Information:              My notes and questions:

## 2025-01-16 NOTE — PROGRESS NOTES
Medication Therapy Management (MTM) Encounter    ASSESSMENT:                            Medication Adherence/Access: No issues identified.    Diabetes   A1c not at goal <8% but much improved from last check. Fasting blood glucose seems to mainly be in range  mg/dL and postprandial getting closer to goal <180 mg/dL. Overall, blood sugars are much closer to goals than previous visits. Would not recommend changes today, but may need to decrease insulin doses with more doses of Mounjaro.    Heart Failure (HFpEF)/Hypertension/Afib  Clinic blood pressures at goal <140/90 mmHg and most recent was <130/80 mmHg. Will continue to monitor what may be some instances of hypotension. Would recommend patient follow up with PCP for further assessment of leg wounds.    Hyperlipidemia   LDL at goal <70 mg/dL.    Allergy   Symptoms still present, but seem stable and manageable for now. Will continue to monitor.     PLAN:                            Continue your current medications  Send Dr. Tristan a [employer] message about your legs, she may ask you to come in and see her    Follow-up: 2/4 at 10AM for a video visit    SUBJECTIVE/OBJECTIVE:                          Betty Tee is a 84 year old female contacted via secure video for a follow-up visit. Patient was accompanied by caretaker Nghia for our visit today.      Reason for visit: Routine follow up, diabetes management.    Allergies/ADRs: Reviewed in chart  Past Medical History: Reviewed in chart  Tobacco: She reports that she quit smoking about 13 years ago. Her smoking use included cigarettes. She started smoking about 55 years ago. She has a 20.8 pack-year smoking history. She has never used smokeless tobacco.  Alcohol: no change from previous visits    Medication Adherence/Access: no issues reported.    Diabetes   Aspirin 81mg daily  Mounjaro 12.5 mg weekly (increased from 10 mg on 1/13) - no side effects with increased dose  Lantus 24 units daily  Novolog (or humalog)  sliding scale below - takes with each meal, usually skips lunch so takes with breakfast and dinner. Finishing up supply of Novolog then will switch to Humalog - typically using 8-10 units  If -200 take 8 units  If -250 take 10 units  If -300 take 12 units  If -350 take 16 units  If BG >350 take 20 units    She notes no side effects from the meds above. She confirms she is not experiencing low blood sugars.  Did not use Novolog the past 4 days due to blood sugar being <150 mg/dL  Last week, Betty had a stomach flu and since then blood sugars have been better (was 140s-170s before this)     Blood sugar monitorin-4 time(s) daily; Ranges: 128 this morning, lowest recently was 120 and highest recent reading was 194 (Tuesday afternoon)  Eye exam is up to date  Foot exam: due    Heart Failure (HFpEF)/Hypertension/Afib  Metoprolol ER 50 mg twice daily  Torsemide 40 mg every morning and 20 mg in the afternoon  Patient reports getting dizzy every now and then when she turns quickly.     Patient reports these HF symptoms: some swelling in the legs - reports this is not severe but is noticeable.  Has sores/scratches on her legs - a spot will get very red and irritated then will break open into a sore. Is using a lotion on it which she feels is helpful.  Patient does not self-monitor blood pressure.      Hyperlipidemia   Atorvastatin 5 mg daily  Patient reports no current medication side effects.     Allergy   Loratadine 10 mg once daily  Levocetirizine 5 mg once daily  Flonase (fluticasone) nasal spray - 1-2 spray(s) each nostril once daily as needed, using about once daily  Montelukast 10 mg at bedtime  Patient reports no current medication side effects.    Patient feels that current therapy is somewhat effective. Has a lot of dust in her house so it is hard to control.     ----------------  I spent 20 minutes with this patient today. I offer these suggestions for consideration by Dr. Tristan. A  copy of the visit note was provided to the patient's provider(s).    A summary of these recommendations was sent via [employer].    Meghan Pacheco, MarcosD  Medication Therapy Management Resident  Sabrina Meek PharmD, JAMES, BCACP   Medication Therapy Management Pharmacist      Telemedicine Visit Details  The patient's medications can be safely assessed via a telemedicine encounter.  Type of service:  Video Conference via [employer]  Originating Location (pt. Location): Home    Distant Location (provider location):  On-site  Joined the call at 1/16/2025, 11:35:52 am.  Left the call at 1/16/2025, 11:55:40 am.  You were on the call for 19 minutes 47 seconds     Medication Therapy Recommendations  No medication therapy recommendations to display

## 2025-01-23 ENCOUNTER — VIRTUAL VISIT (OUTPATIENT)
Dept: RHEUMATOLOGY | Facility: CLINIC | Age: 85
End: 2025-01-23
Attending: INTERNAL MEDICINE
Payer: COMMERCIAL

## 2025-01-23 VITALS — BODY MASS INDEX: 29.99 KG/M2 | WEIGHT: 180 LBS | HEIGHT: 65 IN

## 2025-01-23 DIAGNOSIS — M35.02 SJOGREN'S SYNDROME WITH LUNG INVOLVEMENT (H): ICD-10-CM

## 2025-01-23 DIAGNOSIS — M10.9 ACUTE GOUT OF FOOT, UNSPECIFIED CAUSE, UNSPECIFIED LATERALITY: Primary | ICD-10-CM

## 2025-01-23 DIAGNOSIS — Z79.899 LONG-TERM USE OF PLAQUENIL: ICD-10-CM

## 2025-01-23 PROCEDURE — 98006 SYNCH AUDIO-VIDEO EST MOD 30: CPT | Performed by: INTERNAL MEDICINE

## 2025-01-23 PROCEDURE — G2211 COMPLEX E/M VISIT ADD ON: HCPCS | Performed by: INTERNAL MEDICINE

## 2025-01-23 ASSESSMENT — PAIN SCALES - GENERAL: PAINLEVEL_OUTOF10: MILD PAIN (1)

## 2025-01-23 NOTE — PROGRESS NOTES
Virtual Visit Details    Type of service:  Video Visit       Joined the call at 2025, 1:10:14 pm.  Left the call at 2025, 1:17:57 pm.      Originating Location (pt. Location): Home  Distant Location (provider location):  Off-site  Platform used for Video Visit: [facility]    Rheumatology Clinic Visit Note  Zulma Lopez MD  DOS:2025  Date of last visit: 2024    Name: Betty Tee  MRN: [id number]  Age: 84 year old  : 1940  Reason for visit: Follow up visit for PMR, presumed gout, primary Sjogren's syndrome    # Sjogren's syndrome since  with borderline +RG, +SSA, and lip biopsy focus score of 1. Ongoing dry mouth on evoxac qod  # Follicular bronchiolitis, prior mild ILD   # Osteopenia on DEXA 2019 with T score=-2.1 with decline in bone density on fosamax  # Chronic prednisone use  # DM  # A fib  # Severe R hip OA  # PMR stable on prednisone 5 mg every day  #Presumed gout flare, recovered  #ESOB      Assessment and Plan:    New Dx of PMR. Severe R hip pain is due to severe OA based on 2020 hip MRI. She prefered to avoid hip arthroplasty in the past but it is quite bothersome and would like to see ortho. Her inflammation markers improved on prednisone, PMR responded to prednisone, now is 5 mg qd.     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 Completed pulmonary rehab in 2019 where she was able to exercise without oxygen. GFR stable.    On HCQ since 2019 with significant improvement of joint pain. No retinal toxicity on eye exam done 2021. Tolerates HCQ well.     Sister Betty recovered from flare of presumed gout (or pseudogout given previous nl SUA) over L foot. She responded to high dose prednisone (40-30-20-10 mg every day each for 3 days) in the past, now is on 5 mg every day.  Given her DM, osteopenia, highly recommend to start using anakinra prn for  gout flares, no flares and has not required it yet.     She preferred in the past not to start daily urate-lowering therapy and her uric acid has been ~6 so allopurinol deferred.     7/8/2022: Sister Betty's major complaint at last visit was ESOB which is improved after Tx of B12 deficiency, iron deficiency due to GIB. 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 Her major complaint today is severe R hip pain due to OA which eventually requires R hip replacement, but she needs to be medically clear for surgery. Discussed pain mx and advised follow up with ortho.      1/13/2023: Stable, no active gout today. B12/iron deficiency anemia has improved. On allopurinol 50 mg po every day.    7/21/2023: On increased allopurinol 100 mg every day since 1/2023, severe polyarticular gout flare last week requiring ER visit and prednisone 40-30-20-10 mg every day, each x 3 days then 5 mg a day, as anakinra daily inj was not enough to control it.    Recovered from gout flare, discussed ongoing m/o gout.    Plan:    Prednisone 5 mg a day    Norco as needed for pain      Cevimeline for dry mouth could be taken 3 times a day (she takes it every other day as does not like to take so many pills)    Stay on fosamax weekly    Yearly eye exam on HCQ    Plaquenil 1 tab a day    For gout flares: take anakinra daily shots x 5-7 days, if not enough, take the prednisone as 40-30-20-10 mg a day, each course x 3 days then go back to 5 mg a day    Return in 6 months (in person)    Labs today    Allopurinol 100 mg a day    Return in person in 6 months    11/3/2023:    Recent gout flare, affecting multiple joints, responded to anakinra well. Will continue anakinra prn. Will re-check SUA with next blood draw and adjust allopurinol as needed. Will refer to endo to address bone health, no improvement of fosamax.    Plan:    Refer to Yvonne Hunt endocrinologist for osteoporosis management    Labs in 1/2024    Keep 1/2024  appointment with me      2/28/2024:    Doing so well, no recent gout flare to use anakinra or prednisone. Significant improvement of inflammation markers.    Plan:    Prednisone 5 mg a day    Norco as needed for pain      Cevimeline for dry mouth could be taken 3 times a day (she takes it every other day as does not like to take so many pills)    Stay on fosamax weekly, upcoming endocrine appointment to address bone health in 5/2024    Yearly eye exam on HCQ    Plaquenil 1 tab a day    Allopurinol 100 mg every day    Labs q6-12 months, reviewed/stable    For gout flares: take anakinra daily shots x 5-7 days, if not enough, take the prednisone as 40-30-20-10 mg a day, each course x 3 days then go back to 5 mg a day        Return in 6 months in person      8/23/2024:    Urgent visit for L wrist erythema/pain/mild swelling after a fall in July, improved by taking prednisone 40 mg every day taper+anakinra x 3 doses (finished last wk) but not resolved, very painful, worried about participating in PT. Wishes not to get local steroid inj by ortho.    Based on presentation, seems like pseudogout (more likely)/gout flare of l wrist triggered by trauma.    Discussed plan of care. Recommend not to do PT till inflammation settles down.    Plan:    Percocet as needed      Plan:      Prednisone 60-40-20-10 mg a day, each course x 3 days then go back on 5 mg a day    Go back on anakinra inj daily x 3 more days    Watch the blood sugar on prednisone    Keep September appointment    9/19/2024:    L wrist flare has subsided, but still has residual pain, she considers getting a MRI done. Now her R foot hurting, no imaging. Could be start of gout flare. Was advised to use anakinra.    Plan 9/19/2024:    Will continue with HCQ, yearly eye exam, prednisone 5 mg every day.    Start anakinra daily shots and use it till pain goes away    Let me know if you want L wrist MRI to be done    Return in about 3 months (video)      Today  1/23/2025:    Doing well, no recent gout flares, L wrist pain improved after steroid inj.      Plan:    Labs in March 2025    Return in 6 months (in person)    TT 30 min was spent on date of the encounter doing chart review, history and exam, documentation and further activities as noted above. Any prior notes, outside records, laboratory results, and imaging studies were reviewed if relevant.        The longitudinal plan of care for the diagnosis(es)/condition(s) as documented were addressed during this visit. Due to the added complexity in care, I will continue to support Betty in the subsequent management and with ongoing continuity of care.      Zulma Lopez MD    Orders Placed This Encounter   Procedures    ALT    Albumin level    AST    Creatinine    Complement C4    Complement C3    CRP inflammation    Erythrocyte sedimentation rate auto    UA with Microscopic reflex to Culture    Protein  random urine    Creatinine random urine    Uric acid    Cryoglobulin, Quantitative with Reflex to Identification    CBC with Platelets & Differential    Protein electrophoresis        HPI:   Betty Tee is a 81 year old WF with a history of primary Sjogren's syndrome, PMR, OA who presents for follow-up. She was diagnosed with Sjogren's syndrome in 2015 with borderline +RG, +SSA, and lip biopsy focus score of 1. Associated ILD and joint pain are prednisone-responsive which support the diagnosis.     10/1/2021: No gout flares since last visit so has not needed anakinra. She continues to have R hip pain related to severe OA but does not want to pursue surgery. She recently started pool therapy and began taking CBD oil yesterday. Is hoping to pursue medical marijuana in the future as she does not want to be dependent on norco. Aside from hip pain has otherwise been doing well.        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 She is on prednisone 5 mg a day, she was given  prednisone taper recently for possible L foot gout flare which helped. Did not flare again to need using anakinra shots. She has severe R hip pain. Saw ortho, had R hip MRI on 9/5/2020 which showed severe OA, R hip arthroplasty was recommended. She would prefer to delay R hip arthroplasty, had R hip inj on 2/24, NSAIDs are not allowed with CKD. Norco helps but she does not like to be dependent on it, takes it rarely. No L foot pain today. Her hip/leg pain/back pain is getting better, going to PT, doing exercises at home, last time elbow massage caused pain. Epidural shot helped. Has dry mouth. SOB is stable at baseline.  Last 3 wk has been hard, her doctor took her off gabapentin, sweat/chills and loss of appetite. Reason was being on other meds. Now off gabapentin. Had several gushing bowel explosion, could not make it to the bathroom. Random, unpredictable. No triggers. Has had this problem for years.     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    No gout flares, has not required anakinra, it is in the fridge.    Has breathing issue, ESOB is worse. O2 level is good. Saw PCP, was recomemnded to do follow up with cardiology.    Getting R hip steroid inj, last one was for bursitis. Still hurts crissy today. Saw pain mx yesterday. Got minimal exercises to help moving.    7/8/2022:    Sister Betty presents for follow up. At last visit,w as found to be anemic which explained her SOB. She had iron deficiency and B12 deficiency. Was found to have GIB. Had multiple GI visits, EGD, was treated with cauterization x 3  for GAVE. This AM, had brown stool not black.    SOB is improved.    No gout flares since last visit.    Her major complaint is severe R hip pain, not responding to current pain mx, considers hip arthroplasty, but was told to wait till anemia gets fixed, also has to figure out her heart condition.      1/13/2023:     No gout flare today  Anakinra prn helps with flares  SOB is better  Hip OA pain is the  same, considers arthroplasty.      7/21/2023:    Reviewed chart, labs, recent events.    -s/p R hip arthroplasty, recovered well  -Severe polyarticular gout flare last week requiring ER visit and prednisone 40-30-20-10 mg every day, each x 3 days then 5 mg a day, as anakinra qd inj was not enough to control it.  -better now    11/3/2023: Doing better than last visit, still flares with gout but not as bad, anakinra helps.    2/28/2024: Doing so well, no gout flares, no complaints.    8/23/2024:    Flare, severe L wrist pain/swelling after fall in July. No fracture. Prednisone+anakinra helped with decreased swelling, pain is severe.       9/19/2024:    -L writ swelling/pain is much better after anakinra, prednisone taper, but still has residual pain    - r foot just started to hurt        Today 1/23/2025:    Doing well, no recent gout flares, l wrist pain finally got better after local steroid inj    Review of Systems:   A comprehensive ROS was done. Positives are per HPI.      Active Medications:     Outpatient Medications Prior to Visit   Medication Sig Dispense Refill    acyclovir (ZOVIRAX) 400 MG tablet Take 1 tablet (400 mg) by mouth every 8 hours For a couple days 15 tablet 2    acyclovir (ZOVIRAX) 5 % external ointment Apply topically as needed (6 times day PRN for outbreaks) As needed for outbreaks 15 g 3    albuterol (PROAIR HFA/PROVENTIL HFA/VENTOLIN HFA) 108 (90 Base) MCG/ACT inhaler Inhale 2 puffs into the lungs every 6 hours as needed for wheezing or shortness of breath      allopurinol (ZYLOPRIM) 100 MG tablet TAKE 1 TABLET BY MOUTH EVERY DAY 90 tablet 1    anakinra (KINERET) 100 MG/0.67ML SOSY injection Inject 0.67 mLs (100 mg) subcutaneously daily. 20.1 mL 3    aspirin (ASA) 81 MG EC tablet Take 1 tablet (81 mg) by mouth 2 times daily 60 tablet 0    atorvastatin (LIPITOR) 10 MG tablet Take 0.5 tablets (5 mg) by mouth daily 45 tablet 2    azithromycin (ZITHROMAX) 250 MG tablet TAKE 1 TABLET BY MOUTH  EVERY DAY 30 tablet 11    blood glucose (ACCU-CHEK SHANNON PLUS) test strip USE TO TEST BLOOD SUGARS 3 TIMES DAILY 300 strip 1    blood glucose (NO BRAND SPECIFIED) lancets standard Use to test blood sugar 3 times daily or as directed 100 Lancet 3    BREO ELLIPTA 100-25 MCG/ACT inhaler TAKE 1 PUFF BY MOUTH EVERY DAY 1 each 11    cevimeline (EVOXAC) 30 MG capsule Take 1 capsule (30 mg) by mouth 3 times daily as needed 270 capsule 3    Cholecalciferol (VITAMIN D3) 50 MCG (2000 UT) CAPS TAKE 100 MCG BY MOUTH DAILY (TAKE 2 TABLET (50 MCG) BY MOUTH DAILY - ORAL) 180 capsule 2    COMPOUNDED NON-CONTROLLED SUBSTANCE (CMPD RX) - PHARMACY TO MIX COMPOUNDED MEDICATION Estriol 1 mg/g Apply small amount to finger and apply to inside vagina daily for 2 weeks then twice weekly Route: vaginally Dispense 30 grams 11 refills 30 g 11    cyanocobalamin (VITAMIN B-12) 1000 MCG tablet Take 1 tablet (1,000 mcg) by mouth three times a week      escitalopram (LEXAPRO) 20 MG tablet TAKE 1 TABLET BY MOUTH EVERY DAY 90 tablet 1    fluticasone (FLONASE) 50 MCG/ACT nasal spray SPRAY 1-2 SPRAYS INTO BOTH NOSTRILS DAILY AS NEEDED FOR ALLERGIES 16 mL 11    hydroxychloroquine (PLAQUENIL) 200 MG tablet TAKE 1 TABLET (200 MG) BY MOUTH DAILY GET ANNUAL EYE EXAMS FOR MONITORING AND FAX [phone] 90 tablet 3    insulin glargine (LANTUS SOLOSTAR) 100 UNIT/ML pen Inject 24 Units subcutaneously at bedtime. 30 mL 0    insulin lispro (HUMALOG KWIKPEN) 100 UNIT/ML (1 unit dial) KWIKPEN Sliding scale insulin; tests BG three times day If BG <150, no insulin given If -200 take 8 units If -250 take 10 units If -300 take 12 units If -350 take 16 units If BG >350 take 20 units 15 mL 1    insulin pen needle (BD PEN NEEDLE JANE 2ND GEN) 32G X 4 MM miscellaneous USE TO TEST 4 TIMES A  each 1    ketoconazole (NIZORAL) 2 % external cream Apply topically 2 times daily as needed for itching 60 g 1    levocetirizine (XYZAL) 5 MG tablet  TAKE 1 TABLET BY MOUTH EVERY DAY IN THE EVENING 90 tablet 1    lidocaine (LIDODERM) 5 % patch APPLY PATCH TO PAINFUL AREA FOR UP TO 12 H WITHIN A 24 H PERIOD. REMOVE AFTER 12 HOURS. 30 patch 2    loratadine (CLARITIN) 10 MG tablet TAKE 1 TABLET BY MOUTH EVERY DAY 90 tablet 2    methocarbamol (ROBAXIN) 750 MG tablet Take 1 tablet (750 mg) by mouth 3 times daily as needed for muscle spasms 90 tablet 3    metoprolol succinate ER (TOPROL XL) 50 MG 24 hr tablet Take 1 tablet (50 mg) by mouth 2 times daily. 180 tablet 3    mirabegron (MYRBETRIQ) 25 MG 24 hr tablet Take 1 tablet (25 mg) by mouth daily. For additional refills, please schedule a follow-up appointment. 30 tablet 1    montelukast (SINGULAIR) 10 MG tablet TAKE 1 TABLET BY MOUTH EVERYDAY AT BEDTIME 90 tablet 3    MOUNJARO 12.5 MG/0.5ML SOAJ Inject 0.5 mLs (12.5 mg) subcutaneously every 7 days. 2 mL 0    oxyCODONE-acetaminophen (PERCOCET) 5-325 MG tablet Take 1 tablet by mouth every 8 hours as needed for pain. 60 tablet 0    pantoprazole (PROTONIX) 40 MG EC tablet Take 1 tablet (40 mg) by mouth daily (replaces famotidine- should stop taking famotidine) 90 tablet 3    polyethylene glycol (MIRALAX) 17 g packet Take 17 g by mouth daily 10 packet 0    potassium chloride ashwini ER (KLOR-CON M20) 20 MEQ CR tablet TAKE 2 TABLETS (40 MEQ) BY MOUTH EVERY MORNING AND 1 TABLET (20 MEQ) EVERY EVENING. 270 tablet 3    predniSONE (DELTASONE) 5 MG tablet Take 1 tablet (5 mg) by mouth daily. 90 tablet 1    torsemide (DEMADEX) 20 MG tablet TAKE 2 TABLETS (40 MG) BY MOUTH EVERY MORNING AND 1 TABLET (20 MG) DAILY AT 2 PM. TAKE THE AFTERNOON DOSE WITH AN EXTRA 10 MG TAB FOR A TOTAL OF 30 MG IN THE AFTERNOON 270 tablet 3    traZODone (DESYREL) 50 MG tablet Take 1-2 tablets ( mg) by mouth at bedtime. 90 tablet 0     Facility-Administered Medications Prior to Visit   Medication Dose Route Frequency Provider Last Rate Last Admin    denosumab (PROLIA) injection 60 mg  60 mg  Subcutaneous Q6 Months    60 mg at 01/17/25 1528     Allergies:   Allergies   Allergen Reactions    Amoxicillin-Pot Clavulanate Nausea and Vomiting    Amoxicillin-Pot Clavulanate     Codeine Nausea and Vomiting     PN: LW Reaction: HIVES    Penicillins Nausea and Vomiting     PN: LW Reaction: GI Upset    Phenobarbital Itching    Seasonal Allergies         Past Medical History:  Alcohol abuse, in remission.   Allergic rhinitis.   Antiplatelet long-term use.   Atrial fibrillation.   Cardiomegaly.   Osteopenia.   Diverticulosis.   Benign essential hypertension.   GERD.   Insomnia.   Irregular heart beat.   Lumbago.   Major depressive disorder, recurrent episode, moderate.   ANGELICA.  Osteoarthrosis.   Sjogren's syndrome.   Sleep apnea.   Tobacco use disorder.   TMJ disorder.   RLS.  Major depressive disorder.   Hypertension.   Sciatica.   Colouterine fistula.   Left ventricular hypertrophy.   Breat fibroadenoma.   DVT.   Fatty liver disease, nonalcoholic.   Rib pain.   Neck mass.   Interstitial lung disease.   Acute and chronic respiratory failure with hypoxia.   Type II diabetes mellitus.   Hyperlipidemia.   Inflammatory arthritis.      Past Surgical History:  Back surgery.   Left breast biopsy.   Appendectomy.   Exploratory laparotomy.   Cardiac surgery.   Cholecystectomy.   Left colectomy.   Total abdominal hysterectomy with bilateral salpingo-oophorectomy.   Insert ureter stent.   Flexible sigmoidoscopy.   Ileostomy takedown.     Family History:   Mother: Positive for CAD, heart disease, hypertension, cerebrovascular disease, hyperlipidemia.   Father: Positive for alcohol/drug abuse, Alzheimer disease, dementia, hypertension, hyperlipidemia.   Sister: Positive for CAD, hypertension, and colorectal cancer.   Sister: Positive for hypertension and hyperlipidemia.   Sister: Positive for diabetes, lung cancer, asthma, and heart disease.   Brother: Positive for Parkinsonism and substance abuse.   Brother: Positive for  hypertension, diverticulitis, and prostate cancer.   Daughter: Positive for breast cancer.        Social History:   She is a former smoker; quit in 2011. She has a history of alcohol use but stopped drinking in 1986.      Physical Exam:     LMP  (LMP Unknown)     Constitutional: NAD, very pleasant, sister Nghia present   Eyes: Normal EOM, conjunctiva, sclera  MSK: no synovitis  Skin: No rash  Neuro: grossly non-focal  Psych: Normal affect.    Images:  CT Chest w/o contrast (7/25/18):  Findings remain most consistent with small airway disease  and/or nonclassifiable interstitial lung disease and are not  significantly changed from the March 2017 comparison.    Per radiology.    Laboratory:       Latest Ref Rng & Units 4/25/2024     4:35 PM 7/8/2024     1:29 PM 9/12/2024    11:38 AM   RHEUM RESULTS   ALT 0 - 50 U/L  12     AST 0 - 45 U/L  20     Creatinine 0.51 - 0.95 mg/dL 1.02  0.97  0.96    CRP Inflammation <5.00 mg/L  19.00     GFR Estimate >60 mL/min/1.73m2 54  57  58    Hematocrit 35.0 - 47.0 %  40.9     Hemoglobin 11.7 - 15.7 g/dL  12.9     WBC 4.0 - 11.0 10e3/uL  9.4     RBC Count 3.80 - 5.20 10e6/uL  4.41     RDW 10.0 - 15.0 %  13.9     MCHC 31.5 - 36.5 g/dL  31.5     MCV 78 - 100 fL  93     Platelet Count 150 - 450 10e3/uL  205         Rheumatoid Factor   Date Value Ref Range Status   07/24/2015 <20 <20 IU/mL Final   ,  ,   Cyclic Cit Pept IgG/IgA   Date Value Ref Range Status   07/24/2015 <20  Interpretation:  Negative   <20 UNITS Final   ,  ,   Scleroderma Antibody Scl-70 CECILIA IgG   Date Value Ref Range Status   07/24/2015  0.0 - 0.9 AI Final    <0.2  Negative   Antibody index (AI) values reflect qualitative changes in antibody   concentration that cannot be directly associated with clinical condition or   disease state.       SSA (Ro) (CECILIA) Antibody, IgG   Date Value Ref Range Status   07/24/2015 1.6 (H) 0.0 - 0.9 AI Final     Comment:     Positive   Antibody index (AI) values reflect qualitative changes in  antibody   concentration that cannot be directly associated with clinical condition or   disease state.       SSB (La) (CECILIA) Antibody, IgG   Date Value Ref Range Status   07/24/2015  0.0 - 0.9 AI Final    <0.2  Negative   Antibody index (AI) values reflect qualitative changes in antibody   concentration that cannot be directly associated with clinical condition or   disease state.       ,  ,   RG Screen by EIA   Date Value Ref Range Status   07/24/2015 <1.0  Interpretation:  Negative   <1.0 Final   ,  ,  ,  ,  ,  ,  ,  ,  ,  ,  ,  ,  ,  ,  ,  ,  ,  ,   Neutrophil Cytoplasmic IgG Antibody   Date Value Ref Range Status   07/24/2015   Final    <1:20  Reference range: <1:20  (Note)  The ANCA IFA is <1:20; therefore, no further testing will  be performed.  INTERPRETIVE INFORMATION: Anti-Neutrophil Cyto Ab, IgG  Neutrophil Cytoplasmic Antibodies (C-ANCA = granular  cytoplasmic staining, P-ANCA = perinuclear staining) are  found in the serum of over 90 percent of patients with  certain necrotizing systemic vasculitides, and usually in  less than 5 percent of patients with collagen vascular  disease or arthritis.  Performed by [email or web address],  [address] [place] [id number] [phone]  [email or web address], Burke Mckeon MD, Lab. Director       ,  ,  ,  ,  ,  ,  ,   IGG   Date Value Ref Range Status   07/24/2015 1320 695 - 1620 mg/dL Final   ,  ,  ,  ,  ,   Scleroderma Antibody Scl-70 CECILIA IgG   Date Value Ref Range Status   07/24/2015  0.0 - 0.9 AI Final    <0.2  Negative   Antibody index (AI) values reflect qualitative changes in antibody   concentration that cannot be directly associated with clinical condition or   disease state.          CBC RESULTS: 6/4/2021   Lab Test 06/04/21  1422   WBC 8.6   RBC 4.46   HGB 13.4   HCT 42.1   MCV 94   MCH 30.0   MCHC 31.8   RDW 15.5*        Last Comprehensive Metabolic Panel:  Sodium   Date Value Ref Range Status   09/12/2024 137 135 - 145 mmol/L Final   06/04/2021 137 133  - 144 mmol/L Final     Potassium   Date Value Ref Range Status   09/12/2024 4.2 3.4 - 5.3 mmol/L Final   08/23/2022 4.1 3.4 - 5.3 mmol/L Final   06/04/2021 4.0 3.4 - 5.3 mmol/L Final     Chloride   Date Value Ref Range Status   09/12/2024 98 98 - 107 mmol/L Final   08/23/2022 107 94 - 109 mmol/L Final   06/04/2021 106 94 - 109 mmol/L Final     Carbon Dioxide   Date Value Ref Range Status   06/04/2021 25 20 - 32 mmol/L Final     Carbon Dioxide (CO2)   Date Value Ref Range Status   09/12/2024 28 22 - 29 mmol/L Final   08/23/2022 22 20 - 32 mmol/L Final     Anion Gap   Date Value Ref Range Status   09/12/2024 11 7 - 15 mmol/L Final   08/23/2022 8 3 - 14 mmol/L Final   06/04/2021 6 3 - 14 mmol/L Final     Glucose   Date Value Ref Range Status   09/12/2024 428 (H) 70 - 99 mg/dL Final   08/23/2022 198 (H) 70 - 99 mg/dL Final   06/04/2021 142 (H) 70 - 99 mg/dL Final     GLUCOSE BY METER POCT   Date Value Ref Range Status   06/05/2023 259 (H) 70 - 99 mg/dL Final     Urea Nitrogen   Date Value Ref Range Status   09/12/2024 12.6 8.0 - 23.0 mg/dL Final   08/23/2022 17 7 - 30 mg/dL Final   06/04/2021 14 7 - 30 mg/dL Final     Creatinine   Date Value Ref Range Status   09/12/2024 0.96 (H) 0.51 - 0.95 mg/dL Final   06/04/2021 1.11 (H) 0.52 - 1.04 mg/dL Final     GFR Estimate   Date Value Ref Range Status   09/12/2024 58 (L) >60 mL/min/1.73m2 Final     Comment:     eGFR calculated using 2021 CKD-EPI equation.   06/04/2021 47 (L) >60 mL/min/[1.73_m2] Final     Comment:     Non  GFR Calc  Starting 12/18/2018, serum creatinine based estimated GFR (eGFR) will be   calculated using the Chronic Kidney Disease Epidemiology Collaboration   (CKD-EPI) equation.       Calcium   Date Value Ref Range Status   09/12/2024 9.3 8.8 - 10.4 mg/dL Final     Comment:     Reference intervals for this test were updated on 7/16/2024 to reflect our healthy population more accurately. There may be differences in the flagging of prior  results with similar values performed with this method. Those prior results can be interpreted in the context of the updated reference intervals.   06/04/2021 8.8 8.5 - 10.1 mg/dL Final       ESR 6/4/2021: 10.0    CRP 6/4/2021: 3.0    Uric acid 6/4/2021: 6.2    Component      Latest Ref Rng & Units 2/11/2022   WBC      4.0 - 11.0 10e3/uL 11.5 (H)   RBC Count      3.80 - 5.20 10e6/uL 3.47 (L)   Hemoglobin      11.7 - 15.7 g/dL 9.1 (L)   Hematocrit      35.0 - 47.0 % 30.2 (L)   MCV      78 - 100 fL 87   MCH      26.5 - 33.0 pg 26.2 (L)   MCHC      31.5 - 36.5 g/dL 30.1 (L)   RDW      10.0 - 15.0 % 15.1 (H)   Platelet Count      150 - 450 10e3/uL 283   % Neutrophils      % 76   % Lymphocytes      % 9   % Monocytes      % 11   % Eosinophils      % 2   % Basophils      % 1   % Immature Granulocytes      % 1   NRBCs per 100 WBC      <1 /100 0   Absolute Neutrophils      1.6 - 8.3 10e3/uL 8.9 (H)   Absolute Lymphocytes      0.8 - 5.3 10e3/uL 1.0   Absolute Monocytes      0.0 - 1.3 10e3/uL 1.2   Absolute Eosinophils      0.0 - 0.7 10e3/uL 0.2   Absolute Basophils      0.0 - 0.2 10e3/uL 0.1   Absolute Immature Granulocytes      <=0.4 10e3/uL 0.1   Absolute NRBCs      10e3/uL 0.0   Albumin Fraction      3.7 - 5.1 g/dL 3.9   Alpha 1 Fraction      0.2 - 0.4 g/dL 0.4   Alpha 2 Fraction      0.5 - 0.9 g/dL 0.8   Beta Fraction      0.6 - 1.0 g/dL 1.0   Gamma Fraction      0.7 - 1.6 g/dL 0.8   Monoclonal Peak      <=0.0 g/dL 0.0   ELP Interpretation:       Essentially normal electrophoretic pattern. No obvious monoclonal proteins seen. Pathologic significance requires clinical correlation. Meghan Borthers M.D., Ph.D.   Iron      35 - 180 ug/dL 17 (L)   Iron Binding Cap      240 - 430 ug/dL 354   Iron Saturation Index      15 - 46 % 5 (L)   Creatinine      0.52 - 1.04 mg/dL 1.00   GFR Estimate      >60 mL/min/1.73m2 56 (L)   % Retic      0.5 - 2.0 % 2.7 (H)   Absolute Retic      0.025 - 0.095 10e6/uL 0.090   ALT      0 - 50 U/L 17    Albumin      3.4 - 5.0 g/dL 3.3 (L)   AST      0 - 45 U/L 15   Sed Rate      0 - 30 mm/hr 36 (H)   CRP Inflammation      0.0 - 8.0 mg/L 12.5 (H)   Uric Acid      2.6 - 6.0 mg/dL 6.1 (H)   Immunofixation ELP       No monoclonal protein seen on immunofixation. Pathologic significance requires clinical correlation.  Meghan Brothers M.D., Ph.D.   Immunofix ELP Urine       No monoclonal protein seen on immunofixation. Pathologic significance requires clinical correlation.  Meghan Brothers M.D., Ph.D.   Total Protein Serum for ELP      6.8 - 8.8 g/dL 6.9   N-Terminal Pro Bnp      0 - 450 pg/mL 239   Haptoglobin      32 - 197 mg/dL 149   Vitamin B12      193 - 986 pg/mL 162 (L)   Ferritin      8 - 252 ng/mL 14

## 2025-01-23 NOTE — LETTER
2025       RE: Betty Tee  [address]  [place] [address]     Dear Colleague,    Thank you for referring your patient, Betty Tee, to the [facility] [place] at [facility]. Please see a copy of my visit note below.    Virtual Visit Details    Type of service:  Video Visit       Joined the call at 2025, 1:10:14 pm.  Left the call at 2025, 1:17:57 pm.      Originating Location (pt. Location): Home  Distant Location (provider location):  Off-site  Platform used for Video Visit: [facility]    Rheumatology Clinic Visit Note  Zulma Lopez MD  DOS:2025  Date of last visit: 2024    Name: Betty Tee  MRN: [id number]  Age: 84 year old  : 1940  Reason for visit: Follow up visit for PMR, presumed gout, primary Sjogren's syndrome    # Sjogren's syndrome since  with borderline +RG, +SSA, and lip biopsy focus score of 1. Ongoing dry mouth on evoxac qod  # Follicular bronchiolitis, prior mild ILD   # Osteopenia on DEXA 2019 with T score=-2.1 with decline in bone density on fosamax  # Chronic prednisone use  # DM  # A fib  # Severe R hip OA  # PMR stable on prednisone 5 mg every day  #Presumed gout flare, recovered  #ESOB      Assessment and Plan:    New Dx of PMR. Severe R hip pain is due to severe OA based on 2020 hip MRI. She prefered to avoid hip arthroplasty in the past but it is quite bothersome and would like to see ortho. Her inflammation markers improved on prednisone, PMR responded to prednisone, now is 5 mg qd.     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 Completed pulmonary rehab in 2019 where she was able to exercise without oxygen. GFR stable.    On HCQ since 2019 with significant improvement of joint pain. No retinal toxicity on eye exam done  1/2021. Tolerates HCQ well.     Sister Betty recovered from flare of presumed gout (or pseudogout given previous nl SUA) over L foot. She responded to high dose prednisone (40-30-20-10 mg every day each for 3 days) in the past, now is on 5 mg every day.  Given her DM, osteopenia, highly recommend to start using anakinra prn for gout flares, no flares and has not required it yet.     She preferred in the past not to start daily urate-lowering therapy and her uric acid has been ~6 so allopurinol deferred.     7/8/2022: Sister Betty's major complaint at last visit was ESOB which is improved after Tx of B12 deficiency, iron deficiency due to GIB. 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 Her major complaint today is severe R hip pain due to OA which eventually requires R hip replacement, but she needs to be medically clear for surgery. Discussed pain mx and advised follow up with ortho.      1/13/2023: Stable, no active gout today. B12/iron deficiency anemia has improved. On allopurinol 50 mg po every day.    7/21/2023: On increased allopurinol 100 mg every day since 1/2023, severe polyarticular gout flare last week requiring ER visit and prednisone 40-30-20-10 mg every day, each x 3 days then 5 mg a day, as anakinra daily inj was not enough to control it.    Recovered from gout flare, discussed ongoing m/o gout.    Plan:    Prednisone 5 mg a day    Norco as needed for pain      Cevimeline for dry mouth could be taken 3 times a day (she takes it every other day as does not like to take so many pills)    Stay on fosamax weekly    Yearly eye exam on HCQ    Plaquenil 1 tab a day    For gout flares: take anakinra daily shots x 5-7 days, if not enough, take the prednisone as 40-30-20-10 mg a day, each course x 3 days then go back to 5 mg a day    Return in 6 months (in person)    Labs today    Allopurinol 100 mg a day    Return in person in 6 months    11/3/2023:    Recent gout flare, affecting  multiple joints, responded to anakinra well. Will continue anakinra prn. Will re-check SUA with next blood draw and adjust allopurinol as needed. Will refer to endo to address bone health, no improvement of fosamax.    Plan:    Refer to Yvonne Hunt endocrinologist for osteoporosis management    Labs in 1/2024    Keep 1/2024 appointment with me      2/28/2024:    Doing so well, no recent gout flare to use anakinra or prednisone. Significant improvement of inflammation markers.    Plan:    Prednisone 5 mg a day    Norco as needed for pain      Cevimeline for dry mouth could be taken 3 times a day (she takes it every other day as does not like to take so many pills)    Stay on fosamax weekly, upcoming endocrine appointment to address bone health in 5/2024    Yearly eye exam on HCQ    Plaquenil 1 tab a day    Allopurinol 100 mg every day    Labs q6-12 months, reviewed/stable    For gout flares: take anakinra daily shots x 5-7 days, if not enough, take the prednisone as 40-30-20-10 mg a day, each course x 3 days then go back to 5 mg a day        Return in 6 months in person      8/23/2024:    Urgent visit for L wrist erythema/pain/mild swelling after a fall in July, improved by taking prednisone 40 mg every day taper+anakinra x 3 doses (finished last wk) but not resolved, very painful, worried about participating in PT. Wishes not to get local steroid inj by ortho.    Based on presentation, seems like pseudogout (more likely)/gout flare of l wrist triggered by trauma.    Discussed plan of care. Recommend not to do PT till inflammation settles down.    Plan:    Percocet as needed      Plan:      Prednisone 60-40-20-10 mg a day, each course x 3 days then go back on 5 mg a day    Go back on anakinra inj daily x 3 more days    Watch the blood sugar on prednisone    Keep September appointment    9/19/2024:    L wrist flare has subsided, but still has residual pain, she considers getting a MRI done. Now her R foot hurting,  no imaging. Could be start of gout flare. Was advised to use anakinra.    Plan 9/19/2024:    Will continue with HCQ, yearly eye exam, prednisone 5 mg every day.    Start anakinra daily shots and use it till pain goes away    Let me know if you want L wrist MRI to be done    Return in about 3 months (video)      Today 1/23/2025:    Doing well, no recent gout flares, L wrist pain improved after steroid inj.      Plan:    Labs in March 2025    Return in 6 months (in person)    TT 30 min was spent on date of the encounter doing chart review, history and exam, documentation and further activities as noted above. Any prior notes, outside records, laboratory results, and imaging studies were reviewed if relevant.        The longitudinal plan of care for the diagnosis(es)/condition(s) as documented were addressed during this visit. Due to the added complexity in care, I will continue to support Betty in the subsequent management and with ongoing continuity of care.      Zulma Lopez MD    Orders Placed This Encounter   Procedures     ALT     Albumin level     AST     Creatinine     Complement C4     Complement C3     CRP inflammation     Erythrocyte sedimentation rate auto     UA with Microscopic reflex to Culture     Protein  random urine     Creatinine random urine     Uric acid     Cryoglobulin, Quantitative with Reflex to Identification     CBC with Platelets & Differential     Protein electrophoresis        HPI:   Betty Tee is a 81 year old WF with a history of primary Sjogren's syndrome, PMR, OA who presents for follow-up. She was diagnosed with Sjogren's syndrome in 2015 with borderline +RG, +SSA, and lip biopsy focus score of 1. Associated ILD and joint pain are prednisone-responsive which support the diagnosis.     10/1/2021: No gout flares since last visit so has not needed anakinra. She continues to have R hip pain related to severe OA but does not want to pursue surgery. She recently started pool  therapy and began taking CBD oil yesterday. Is hoping to pursue medical marijuana in the future as she does not want to be dependent on norco. Aside from hip pain has otherwise been doing well.        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 She is on prednisone 5 mg a day, she was given prednisone taper recently for possible L foot gout flare which helped. Did not flare again to need using anakinra shots. She has severe R hip pain. Saw ortho, had R hip MRI on 9/5/2020 which showed severe OA, R hip arthroplasty was recommended. She would prefer to delay R hip arthroplasty, had R hip inj on 2/24, NSAIDs are not allowed with CKD. Norco helps but she does not like to be dependent on it, takes it rarely. No L foot pain today. Her hip/leg pain/back pain is getting better, going to PT, doing exercises at home, last time elbow massage caused pain. Epidural shot helped. Has dry mouth. SOB is stable at baseline.  Last 3 wk has been hard, her doctor took her off gabapentin, sweat/chills and loss of appetite. Reason was being on other meds. Now off gabapentin. Had several gushing bowel explosion, could not make it to the bathroom. Random, unpredictable. No triggers. Has had this problem for years.     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    No gout flares, has not required anakinra, it is in the fridge.    Has breathing issue, ESOB is worse. O2 level is good. Saw PCP, was recomemnded to do follow up with cardiology.    Getting R hip steroid inj, last one was for bursitis. Still hurts crissy today. Saw pain mx yesterday. Got minimal exercises to help moving.    7/8/2022:    Sister Betty presents for follow up. At last visit,w as found to be anemic which explained her SOB. She had iron deficiency and B12 deficiency. Was found to have GIB. Had multiple GI visits, EGD, was treated with cauterization x 3  for GAVE. This AM, had brown stool not black.    SOB is improved.    No  gout flares since last visit.    Her major complaint is severe R hip pain, not responding to current pain mx, considers hip arthroplasty, but was told to wait till anemia gets fixed, also has to figure out her heart condition.      1/13/2023:     No gout flare today  Anakinra prn helps with flares  SOB is better  Hip OA pain is the same, considers arthroplasty.      7/21/2023:    Reviewed chart, labs, recent events.    -s/p R hip arthroplasty, recovered well  -Severe polyarticular gout flare last week requiring ER visit and prednisone 40-30-20-10 mg every day, each x 3 days then 5 mg a day, as anakinra qd inj was not enough to control it.  -better now    11/3/2023: Doing better than last visit, still flares with gout but not as bad, anakinra helps.    2/28/2024: Doing so well, no gout flares, no complaints.    8/23/2024:    Flare, severe L wrist pain/swelling after fall in July. No fracture. Prednisone+anakinra helped with decreased swelling, pain is severe.       9/19/2024:    -L writ swelling/pain is much better after anakinra, prednisone taper, but still has residual pain    - r foot just started to hurt        Today 1/23/2025:    Doing well, no recent gout flares, l wrist pain finally got better after local steroid inj    Review of Systems:   A comprehensive ROS was done. Positives are per HPI.      Active Medications:     Outpatient Medications Prior to Visit   Medication Sig Dispense Refill     acyclovir (ZOVIRAX) 400 MG tablet Take 1 tablet (400 mg) by mouth every 8 hours For a couple days 15 tablet 2     acyclovir (ZOVIRAX) 5 % external ointment Apply topically as needed (6 times day PRN for outbreaks) As needed for outbreaks 15 g 3     albuterol (PROAIR HFA/PROVENTIL HFA/VENTOLIN HFA) 108 (90 Base) MCG/ACT inhaler Inhale 2 puffs into the lungs every 6 hours as needed for wheezing or shortness of breath       allopurinol (ZYLOPRIM) 100 MG tablet TAKE 1 TABLET BY MOUTH EVERY DAY 90 tablet 1     anakinra  (KINERET) 100 MG/0.67ML SOSY injection Inject 0.67 mLs (100 mg) subcutaneously daily. 20.1 mL 3     aspirin (ASA) 81 MG EC tablet Take 1 tablet (81 mg) by mouth 2 times daily 60 tablet 0     atorvastatin (LIPITOR) 10 MG tablet Take 0.5 tablets (5 mg) by mouth daily 45 tablet 2     azithromycin (ZITHROMAX) 250 MG tablet TAKE 1 TABLET BY MOUTH EVERY DAY 30 tablet 11     blood glucose (ACCU-CHEK SHANNON PLUS) test strip USE TO TEST BLOOD SUGARS 3 TIMES DAILY 300 strip 1     blood glucose (NO BRAND SPECIFIED) lancets standard Use to test blood sugar 3 times daily or as directed 100 Lancet 3     BREO ELLIPTA 100-25 MCG/ACT inhaler TAKE 1 PUFF BY MOUTH EVERY DAY 1 each 11     cevimeline (EVOXAC) 30 MG capsule Take 1 capsule (30 mg) by mouth 3 times daily as needed 270 capsule 3     Cholecalciferol (VITAMIN D3) 50 MCG (2000 UT) CAPS TAKE 100 MCG BY MOUTH DAILY (TAKE 2 TABLET (50 MCG) BY MOUTH DAILY - ORAL) 180 capsule 2     COMPOUNDED NON-CONTROLLED SUBSTANCE (CMPD RX) - PHARMACY TO MIX COMPOUNDED MEDICATION Estriol 1 mg/g Apply small amount to finger and apply to inside vagina daily for 2 weeks then twice weekly Route: vaginally Dispense 30 grams 11 refills 30 g 11     cyanocobalamin (VITAMIN B-12) 1000 MCG tablet Take 1 tablet (1,000 mcg) by mouth three times a week       escitalopram (LEXAPRO) 20 MG tablet TAKE 1 TABLET BY MOUTH EVERY DAY 90 tablet 1     fluticasone (FLONASE) 50 MCG/ACT nasal spray SPRAY 1-2 SPRAYS INTO BOTH NOSTRILS DAILY AS NEEDED FOR ALLERGIES 16 mL 11     hydroxychloroquine (PLAQUENIL) 200 MG tablet TAKE 1 TABLET (200 MG) BY MOUTH DAILY GET ANNUAL EYE EXAMS FOR MONITORING AND FAX [phone] 90 tablet 3     insulin glargine (LANTUS SOLOSTAR) 100 UNIT/ML pen Inject 24 Units subcutaneously at bedtime. 30 mL 0     insulin lispro (HUMALOG KWIKPEN) 100 UNIT/ML (1 unit dial) KWIKPEN Sliding scale insulin; tests BG three times day If BG <150, no insulin given If -200 take 8 units If -250 take  10 units If -300 take 12 units If -350 take 16 units If BG >350 take 20 units 15 mL 1     insulin pen needle (BD PEN NEEDLE JANE 2ND GEN) 32G X 4 MM miscellaneous USE TO TEST 4 TIMES A  each 1     ketoconazole (NIZORAL) 2 % external cream Apply topically 2 times daily as needed for itching 60 g 1     levocetirizine (XYZAL) 5 MG tablet TAKE 1 TABLET BY MOUTH EVERY DAY IN THE EVENING 90 tablet 1     lidocaine (LIDODERM) 5 % patch APPLY PATCH TO PAINFUL AREA FOR UP TO 12 H WITHIN A 24 H PERIOD. REMOVE AFTER 12 HOURS. 30 patch 2     loratadine (CLARITIN) 10 MG tablet TAKE 1 TABLET BY MOUTH EVERY DAY 90 tablet 2     methocarbamol (ROBAXIN) 750 MG tablet Take 1 tablet (750 mg) by mouth 3 times daily as needed for muscle spasms 90 tablet 3     metoprolol succinate ER (TOPROL XL) 50 MG 24 hr tablet Take 1 tablet (50 mg) by mouth 2 times daily. 180 tablet 3     mirabegron (MYRBETRIQ) 25 MG 24 hr tablet Take 1 tablet (25 mg) by mouth daily. For additional refills, please schedule a follow-up appointment. 30 tablet 1     montelukast (SINGULAIR) 10 MG tablet TAKE 1 TABLET BY MOUTH EVERYDAY AT BEDTIME 90 tablet 3     MOUNJARO 12.5 MG/0.5ML SOAJ Inject 0.5 mLs (12.5 mg) subcutaneously every 7 days. 2 mL 0     oxyCODONE-acetaminophen (PERCOCET) 5-325 MG tablet Take 1 tablet by mouth every 8 hours as needed for pain. 60 tablet 0     pantoprazole (PROTONIX) 40 MG EC tablet Take 1 tablet (40 mg) by mouth daily (replaces famotidine- should stop taking famotidine) 90 tablet 3     polyethylene glycol (MIRALAX) 17 g packet Take 17 g by mouth daily 10 packet 0     potassium chloride ashwini ER (KLOR-CON M20) 20 MEQ CR tablet TAKE 2 TABLETS (40 MEQ) BY MOUTH EVERY MORNING AND 1 TABLET (20 MEQ) EVERY EVENING. 270 tablet 3     predniSONE (DELTASONE) 5 MG tablet Take 1 tablet (5 mg) by mouth daily. 90 tablet 1     torsemide (DEMADEX) 20 MG tablet TAKE 2 TABLETS (40 MG) BY MOUTH EVERY MORNING AND 1 TABLET (20 MG) DAILY AT 2 PM.  TAKE THE AFTERNOON DOSE WITH AN EXTRA 10 MG TAB FOR A TOTAL OF 30 MG IN THE AFTERNOON 270 tablet 3     traZODone (DESYREL) 50 MG tablet Take 1-2 tablets ( mg) by mouth at bedtime. 90 tablet 0     Facility-Administered Medications Prior to Visit   Medication Dose Route Frequency Provider Last Rate Last Admin     denosumab (PROLIA) injection 60 mg  60 mg Subcutaneous Q6 Months    60 mg at 01/17/25 1528     Allergies:   Allergies   Allergen Reactions     Amoxicillin-Pot Clavulanate Nausea and Vomiting     Amoxicillin-Pot Clavulanate      Codeine Nausea and Vomiting     PN: LW Reaction: HIVES     Penicillins Nausea and Vomiting     PN: LW Reaction: GI Upset     Phenobarbital Itching     Seasonal Allergies         Past Medical History:  Alcohol abuse, in remission.   Allergic rhinitis.   Antiplatelet long-term use.   Atrial fibrillation.   Cardiomegaly.   Osteopenia.   Diverticulosis.   Benign essential hypertension.   GERD.   Insomnia.   Irregular heart beat.   Lumbago.   Major depressive disorder, recurrent episode, moderate.   ANGELICA.  Osteoarthrosis.   Sjogren's syndrome.   Sleep apnea.   Tobacco use disorder.   TMJ disorder.   RLS.  Major depressive disorder.   Hypertension.   Sciatica.   Colouterine fistula.   Left ventricular hypertrophy.   Breat fibroadenoma.   DVT.   Fatty liver disease, nonalcoholic.   Rib pain.   Neck mass.   Interstitial lung disease.   Acute and chronic respiratory failure with hypoxia.   Type II diabetes mellitus.   Hyperlipidemia.   Inflammatory arthritis.      Past Surgical History:  Back surgery.   Left breast biopsy.   Appendectomy.   Exploratory laparotomy.   Cardiac surgery.   Cholecystectomy.   Left colectomy.   Total abdominal hysterectomy with bilateral salpingo-oophorectomy.   Insert ureter stent.   Flexible sigmoidoscopy.   Ileostomy takedown.     Family History:   Mother: Positive for CAD, heart disease, hypertension, cerebrovascular disease, hyperlipidemia.   Father:  Positive for alcohol/drug abuse, Alzheimer disease, dementia, hypertension, hyperlipidemia.   Sister: Positive for CAD, hypertension, and colorectal cancer.   Sister: Positive for hypertension and hyperlipidemia.   Sister: Positive for diabetes, lung cancer, asthma, and heart disease.   Brother: Positive for Parkinsonism and substance abuse.   Brother: Positive for hypertension, diverticulitis, and prostate cancer.   Daughter: Positive for breast cancer.        Social History:   She is a former smoker; quit in 2011. She has a history of alcohol use but stopped drinking in 1986.      Physical Exam:     LMP  (LMP Unknown)     Constitutional: NAD, very pleasant, sister Nghia present   Eyes: Normal EOM, conjunctiva, sclera  MSK: no synovitis  Skin: No rash  Neuro: grossly non-focal  Psych: Normal affect.    Images:  CT Chest w/o contrast (7/25/18):  Findings remain most consistent with small airway disease  and/or nonclassifiable interstitial lung disease and are not  significantly changed from the March 2017 comparison.    Per radiology.    Laboratory:       Latest Ref Rng & Units 4/25/2024     4:35 PM 7/8/2024     1:29 PM 9/12/2024    11:38 AM   RHEUM RESULTS   ALT 0 - 50 U/L  12     AST 0 - 45 U/L  20     Creatinine 0.51 - 0.95 mg/dL 1.02  0.97  0.96    CRP Inflammation <5.00 mg/L  19.00     GFR Estimate >60 mL/min/1.73m2 54  57  58    Hematocrit 35.0 - 47.0 %  40.9     Hemoglobin 11.7 - 15.7 g/dL  12.9     WBC 4.0 - 11.0 10e3/uL  9.4     RBC Count 3.80 - 5.20 10e6/uL  4.41     RDW 10.0 - 15.0 %  13.9     MCHC 31.5 - 36.5 g/dL  31.5     MCV 78 - 100 fL  93     Platelet Count 150 - 450 10e3/uL  205         Rheumatoid Factor   Date Value Ref Range Status   07/24/2015 <20 <20 IU/mL Final   ,  ,   Cyclic Cit Pept IgG/IgA   Date Value Ref Range Status   07/24/2015 <20  Interpretation:  Negative   <20 UNITS Final   ,  ,   Scleroderma Antibody Scl-70 CECILIA IgG   Date Value Ref Range Status   07/24/2015  0.0 - 0.9 AI Final     <0.2  Negative   Antibody index (AI) values reflect qualitative changes in antibody   concentration that cannot be directly associated with clinical condition or   disease state.       SSA (Ro) (CECILIA) Antibody, IgG   Date Value Ref Range Status   07/24/2015 1.6 (H) 0.0 - 0.9 AI Final     Comment:     Positive   Antibody index (AI) values reflect qualitative changes in antibody   concentration that cannot be directly associated with clinical condition or   disease state.       SSB (La) (CECILIA) Antibody, IgG   Date Value Ref Range Status   07/24/2015  0.0 - 0.9 AI Final    <0.2  Negative   Antibody index (AI) values reflect qualitative changes in antibody   concentration that cannot be directly associated with clinical condition or   disease state.       ,  ,   RG Screen by EIA   Date Value Ref Range Status   07/24/2015 <1.0  Interpretation:  Negative   <1.0 Final   ,  ,  ,  ,  ,  ,  ,  ,  ,  ,  ,  ,  ,  ,  ,  ,  ,  ,   Neutrophil Cytoplasmic IgG Antibody   Date Value Ref Range Status   07/24/2015   Final    <1:20  Reference range: <1:20  (Note)  The ANCA IFA is <1:20; therefore, no further testing will  be performed.  INTERPRETIVE INFORMATION: Anti-Neutrophil Cyto Ab, IgG  Neutrophil Cytoplasmic Antibodies (C-ANCA = granular  cytoplasmic staining, P-ANCA = perinuclear staining) are  found in the serum of over 90 percent of patients with  certain necrotizing systemic vasculitides, and usually in  less than 5 percent of patients with collagen vascular  disease or arthritis.  Performed by [facility],  [address] [place] [id number] [phone]  [email or web address], Burke Mckeon MD, Lab. Director       ,  ,  ,  ,  ,  ,  ,   IGG   Date Value Ref Range Status   07/24/2015 1320 695 - 1620 mg/dL Final   ,  ,  ,  ,  ,   Scleroderma Antibody Scl-70 CECILIA IgG   Date Value Ref Range Status   07/24/2015  0.0 - 0.9 AI Final    <0.2  Negative   Antibody index (AI) values reflect qualitative changes in antibody   concentration  that cannot be directly associated with clinical condition or   disease state.          CBC RESULTS: 6/4/2021   Lab Test 06/04/21  1422   WBC 8.6   RBC 4.46   HGB 13.4   HCT 42.1   MCV 94   MCH 30.0   MCHC 31.8   RDW 15.5*        Last Comprehensive Metabolic Panel:  Sodium   Date Value Ref Range Status   09/12/2024 137 135 - 145 mmol/L Final   06/04/2021 137 133 - 144 mmol/L Final     Potassium   Date Value Ref Range Status   09/12/2024 4.2 3.4 - 5.3 mmol/L Final   08/23/2022 4.1 3.4 - 5.3 mmol/L Final   06/04/2021 4.0 3.4 - 5.3 mmol/L Final     Chloride   Date Value Ref Range Status   09/12/2024 98 98 - 107 mmol/L Final   08/23/2022 107 94 - 109 mmol/L Final   06/04/2021 106 94 - 109 mmol/L Final     Carbon Dioxide   Date Value Ref Range Status   06/04/2021 25 20 - 32 mmol/L Final     Carbon Dioxide (CO2)   Date Value Ref Range Status   09/12/2024 28 22 - 29 mmol/L Final   08/23/2022 22 20 - 32 mmol/L Final     Anion Gap   Date Value Ref Range Status   09/12/2024 11 7 - 15 mmol/L Final   08/23/2022 8 3 - 14 mmol/L Final   06/04/2021 6 3 - 14 mmol/L Final     Glucose   Date Value Ref Range Status   09/12/2024 428 (H) 70 - 99 mg/dL Final   08/23/2022 198 (H) 70 - 99 mg/dL Final   06/04/2021 142 (H) 70 - 99 mg/dL Final     GLUCOSE BY METER POCT   Date Value Ref Range Status   06/05/2023 259 (H) 70 - 99 mg/dL Final     Urea Nitrogen   Date Value Ref Range Status   09/12/2024 12.6 8.0 - 23.0 mg/dL Final   08/23/2022 17 7 - 30 mg/dL Final   06/04/2021 14 7 - 30 mg/dL Final     Creatinine   Date Value Ref Range Status   09/12/2024 0.96 (H) 0.51 - 0.95 mg/dL Final   06/04/2021 1.11 (H) 0.52 - 1.04 mg/dL Final     GFR Estimate   Date Value Ref Range Status   09/12/2024 58 (L) >60 mL/min/1.73m2 Final     Comment:     eGFR calculated using 2021 CKD-EPI equation.   06/04/2021 47 (L) >60 mL/min/[1.73_m2] Final     Comment:     Non  GFR Calc  Starting 12/18/2018, serum creatinine based estimated GFR (eGFR)  will be   calculated using the Chronic Kidney Disease Epidemiology Collaboration   (CKD-EPI) equation.       Calcium   Date Value Ref Range Status   09/12/2024 9.3 8.8 - 10.4 mg/dL Final     Comment:     Reference intervals for this test were updated on 7/16/2024 to reflect our healthy population more accurately. There may be differences in the flagging of prior results with similar values performed with this method. Those prior results can be interpreted in the context of the updated reference intervals.   06/04/2021 8.8 8.5 - 10.1 mg/dL Final       ESR 6/4/2021: 10.0    CRP 6/4/2021: 3.0    Uric acid 6/4/2021: 6.2    Component      Latest Ref Rng & Units 2/11/2022   WBC      4.0 - 11.0 10e3/uL 11.5 (H)   RBC Count      3.80 - 5.20 10e6/uL 3.47 (L)   Hemoglobin      11.7 - 15.7 g/dL 9.1 (L)   Hematocrit      35.0 - 47.0 % 30.2 (L)   MCV      78 - 100 fL 87   MCH      26.5 - 33.0 pg 26.2 (L)   MCHC      31.5 - 36.5 g/dL 30.1 (L)   RDW      10.0 - 15.0 % 15.1 (H)   Platelet Count      150 - 450 10e3/uL 283   % Neutrophils      % 76   % Lymphocytes      % 9   % Monocytes      % 11   % Eosinophils      % 2   % Basophils      % 1   % Immature Granulocytes      % 1   NRBCs per 100 WBC      <1 /100 0   Absolute Neutrophils      1.6 - 8.3 10e3/uL 8.9 (H)   Absolute Lymphocytes      0.8 - 5.3 10e3/uL 1.0   Absolute Monocytes      0.0 - 1.3 10e3/uL 1.2   Absolute Eosinophils      0.0 - 0.7 10e3/uL 0.2   Absolute Basophils      0.0 - 0.2 10e3/uL 0.1   Absolute Immature Granulocytes      <=0.4 10e3/uL 0.1   Absolute NRBCs      10e3/uL 0.0   Albumin Fraction      3.7 - 5.1 g/dL 3.9   Alpha 1 Fraction      0.2 - 0.4 g/dL 0.4   Alpha 2 Fraction      0.5 - 0.9 g/dL 0.8   Beta Fraction      0.6 - 1.0 g/dL 1.0   Gamma Fraction      0.7 - 1.6 g/dL 0.8   Monoclonal Peak      <=0.0 g/dL 0.0   ELP Interpretation:       Essentially normal electrophoretic pattern. No obvious monoclonal proteins seen. Pathologic significance requires  clinical correlation. Meghan Brothers M.D., Ph.D.   Iron      35 - 180 ug/dL 17 (L)   Iron Binding Cap      240 - 430 ug/dL 354   Iron Saturation Index      15 - 46 % 5 (L)   Creatinine      0.52 - 1.04 mg/dL 1.00   GFR Estimate      >60 mL/min/1.73m2 56 (L)   % Retic      0.5 - 2.0 % 2.7 (H)   Absolute Retic      0.025 - 0.095 10e6/uL 0.090   ALT      0 - 50 U/L 17   Albumin      3.4 - 5.0 g/dL 3.3 (L)   AST      0 - 45 U/L 15   Sed Rate      0 - 30 mm/hr 36 (H)   CRP Inflammation      0.0 - 8.0 mg/L 12.5 (H)   Uric Acid      2.6 - 6.0 mg/dL 6.1 (H)   Immunofixation ELP       No monoclonal protein seen on immunofixation. Pathologic significance requires clinical correlation.  Meghan Brothers M.D., Ph.D.   Immunofix ELP Urine       No monoclonal protein seen on immunofixation. Pathologic significance requires clinical correlation.  Meghan Brothers M.D., Ph.D.   Total Protein Serum for ELP      6.8 - 8.8 g/dL 6.9   N-Terminal Pro Bnp      0 - 450 pg/mL 239   Haptoglobin      32 - 197 mg/dL 149   Vitamin B12      193 - 986 pg/mL 162 (L)   Ferritin      8 - 252 ng/mL 14       Again, thank you for allowing me to participate in the care of your patient.      Sincerely,    Zulma Lopez MD

## 2025-01-23 NOTE — NURSING NOTE
Current patient location: [address] [place] [address]   [place] [address]    Is the patient currently in the state of [place]? YES    Visit mode: VIDEO    If the visit is dropped, the patient can be reconnected by:VIDEO VISIT: Text to cell phone:   Telephone Information:   Mobile [phone]       Will anyone else be joining the visit? Nghia  (If patient encounters technical issues they should call [phone]    Are changes needed to the allergy or medication list? No    Patient denies any changes and states that all information remains accurate since last reviewed/verified.     Are refills needed on medications prescribed by this physician? NO    Rooming Documentation:  Questionnaire(s) completed    No other vitals to report today    Reason for visit: RECHPAWAN Mcdaniel MA VVF

## 2025-02-02 DIAGNOSIS — E78.5 HYPERLIPIDEMIA LDL GOAL <100: Primary | ICD-10-CM

## 2025-02-04 ENCOUNTER — VIRTUAL VISIT (OUTPATIENT)
Dept: PHARMACY | Facility: CLINIC | Age: 85
End: 2025-02-04
Payer: COMMERCIAL

## 2025-02-04 DIAGNOSIS — Z78.9 TAKES DIETARY SUPPLEMENTS: ICD-10-CM

## 2025-02-04 DIAGNOSIS — E11.65 TYPE 2 DIABETES MELLITUS WITH HYPERGLYCEMIA, WITH LONG-TERM CURRENT USE OF INSULIN (H): Primary | ICD-10-CM

## 2025-02-04 DIAGNOSIS — B00.1 RECURRENT COLD SORES: ICD-10-CM

## 2025-02-04 DIAGNOSIS — M10.9 GOUT, UNSPECIFIED CAUSE, UNSPECIFIED CHRONICITY, UNSPECIFIED SITE: ICD-10-CM

## 2025-02-04 DIAGNOSIS — Z79.4 TYPE 2 DIABETES MELLITUS WITH HYPERGLYCEMIA, WITH LONG-TERM CURRENT USE OF INSULIN (H): Primary | ICD-10-CM

## 2025-02-04 DIAGNOSIS — F33.1 MAJOR DEPRESSIVE DISORDER, RECURRENT EPISODE, MODERATE (H): ICD-10-CM

## 2025-02-04 PROCEDURE — 99607 MTMS BY PHARM ADDL 15 MIN: CPT | Mod: 95

## 2025-02-04 PROCEDURE — 99606 MTMS BY PHARM EST 15 MIN: CPT | Mod: 95

## 2025-02-04 RX ORDER — TIRZEPATIDE 15 MG/.5ML
15 INJECTION, SOLUTION SUBCUTANEOUS
Qty: 6 ML | Refills: 3 | Status: SHIPPED | OUTPATIENT
Start: 2025-02-04

## 2025-02-04 NOTE — PROGRESS NOTES
Medication Therapy Management (MTM) Encounter    ASSESSMENT:                            Medication Adherence/Access: No issues identified.    Diabetes   Patient is not meeting A1c goal of < 8%, but much improved from last check.  Self monitoring of blood glucose is not at goal of fasting  mg/dL. Would recommend increasing Mounjaro. Due to reported blood sugars do not recommend changes to insulin dosing today but may need to consider in the future.    Mental Health   Depression  Stable.     Supplements   Stable. Potassium level is within normal limits.     HSV:  Seems to be working well for outbreaks, but would refer to PCP for further assessment if needed.    Gout:  Plan in place with rheumatology    PLAN:                            Increase Mounjaro to 15 mg weekly  Continue all other medications    Follow-up: 2/25 at 11AM for a video visit    SUBJECTIVE/OBJECTIVE:                          Betty Tee is a 84 year old female contacted via secure video for a follow-up visit. Patient was accompanied by caretaker Nghia for our visit today.      Reason for visit: diabetes follow-up.    Allergies/ADRs: Reviewed in chart  Past Medical History: Reviewed in chart  Tobacco: She reports that she quit smoking about 13 years ago. Her smoking use included cigarettes. She started smoking about 55 years ago. She has a 20.8 pack-year smoking history. She has never used smokeless tobacco.  Alcohol: no change from previous visits    Medication Adherence/Access: no issues reported.    Diabetes   Aspirin 81mg daily  Mounjaro 12.5 mg weekly - last dose of this box used up yesterday - no side effects with increased dose  Lantus 24 units daily  Novolog (or humalog) sliding scale below -Has used in the evening a few times, usually 8-12 units at a time, no use in the morning.   If -200 take 8 units  If -250 take 10 units  If -300 take 12 units  If -350 take 16 units  If BG >350 take 20 units    She notes no  side effects from the meds above. She confirms she is not experiencing low blood sugars.     Blood sugar monitorin-4 time(s) daily; Ranges: (patient reported) Fasting: AM 120s-150s, other premeal numbers: 140s-210s  Eye exam is up to date  Foot exam: due    Mental Health   Depression  Escitalopram 20 mg once daily  Patient reports no current medication side effects  Patient reports she has been feeling good and is starting to exercise     Supplements   Vitamin D 4000 units daily  Vitamin B12 1000 mcg three times weekly  Potassium chloride 40 mEq in the morning and 20 mEq in the evening  No reported issues at this time.      HSV:  Acyclovir 400 mg every 8 hours as needed - took one this morning  Patient reports this has been helpful in the past - will take it when it hurts    Gout:  Anakinra 100 mg daily as needed for flares - has not needed since August  Prednisone 5 mg daily and increases dose (60 mg x 3 days then taper down) for flares  Patient reports when she does have a flare, the anakinra and prednisone dose increase are helpful  Allopurinol is on medication list, but patient reports she does not take this  Follows with Rheumatology    Today's Vitals: LMP  (LMP Unknown)   ----------------      I spent 27 minutes with this patient today. All changes were made via collaborative practice agreement with Dr. Tristan. A copy of the visit note was provided to the patient's provider(s).    A summary of these recommendations was sent via [employer].    Meghan Pacheco, PharmD  Medication Therapy Management Resident  Sabrina Meek, PharmD, JAMES, BCACP   Medication Therapy Management Pharmacist      Telemedicine Visit Details  The patient's medications can be safely assessed via a telemedicine encounter.  Type of service:  Video Conference via [employer]  Originating Location (pt. Location): Home    Distant Location (provider location):  On-site  Start Time:  9:58 AM  End Time: 10:25 AM     Medication Therapy  Recommendations  Type 2 diabetes mellitus with hyperglycemia, with long-term current use of insulin (H)   1 Current Medication: MOUNJARO 12.5 MG/0.5ML SOAJ (Discontinued)   Current Medication Sig: Inject 0.5 mLs (12.5 mg) subcutaneously every 7 days.   Rationale: Dose too low - Dosage too low - Effectiveness   Recommendation: Increase Dose - Mounjaro 15 MG/0.5ML Soaj   Status: Accepted per CPA   Identified Date: 2/4/2025 Completed Date: 2/4/2025

## 2025-02-04 NOTE — PATIENT INSTRUCTIONS
"Recommendations from today's MTM visit:                                                    MTM (medication therapy management) is a service provided by a clinical pharmacist designed to help you get the most of out of your medicines.   Today we reviewed what your medicines are for, how to know if they are working, that your medicines are safe and how to make your medicine regimen as easy as possible.      Increase Mounjaro to 15 mg weekly  Continue all other medications    Follow-up: 2/25 at 11AM for a video visit    It was great speaking with you today.  I value your experience and would be very thankful for your time in providing feedback in our clinic survey. In the next few days, you may receive an email or text message from [facility] [employer] with a link to a survey related to your  clinical pharmacist.\"     To schedule another MTM appointment, please call the clinic directly or you may call the MTM scheduling line at [phone] or toll-free at [phone].     My Clinical Pharmacist's contact information:                                                      Please feel free to contact me with any questions or concerns you have.      Meghan Pacheco, PharmD  Medication Therapy Management Resident, [facility]  Phone: [phone]      Sabrina Meek, Pharm.D., M.B.A., [facility]  Medication Therapy Management Pharmacist, [facility]  Phone: [phone]   "

## 2025-02-06 DIAGNOSIS — A60.00 GENITAL HERPES SIMPLEX, UNSPECIFIED SITE: Primary | ICD-10-CM

## 2025-02-06 DIAGNOSIS — B00.1 RECURRENT COLD SORES: ICD-10-CM

## 2025-02-06 RX ORDER — ACYCLOVIR 400 MG/1
400 TABLET ORAL EVERY 8 HOURS
Qty: 21 TABLET | Refills: 0 | Status: SHIPPED | OUTPATIENT
Start: 2025-02-06 | End: 2025-02-13

## 2025-02-06 RX ORDER — ATORVASTATIN CALCIUM 10 MG/1
5 TABLET, FILM COATED ORAL DAILY
Qty: 45 TABLET | Refills: 1 | Status: SHIPPED | OUTPATIENT
Start: 2025-02-06

## 2025-02-06 RX ORDER — ACYCLOVIR 50 MG/G
OINTMENT TOPICAL PRN
Qty: 15 G | Refills: 2 | Status: SHIPPED | OUTPATIENT
Start: 2025-02-06

## 2025-02-06 NOTE — TELEPHONE ENCOUNTER
The recommended dose of acyclovir for immune compromised (arguably is immune compromised since taking plaquenil (hydroxychloroquine) is 400mg three times daily for 5-10 days.     Let me know if she needs a new prescription.     I would recommend office visit in the next 0-3 days for assessment if this isn't improving especially if she hasn't had genital outbreaks in the past.  We would want to make sure this isn't actually a yeast infection or other kind of infection.     If there is significant pain and/or fevers then I would recommend urgent care or EMERGENCY ROOM to evaluate for labial cellulitis.     Joel Wegener,MD w

## 2025-02-06 NOTE — TELEPHONE ENCOUNTER
Spoke w/ patient.   Relayed message below.   Patient would also like us to refill the ointment.   Patient is running out.   Thanks!  Jocelyn BARFIELD

## 2025-02-06 NOTE — TELEPHONE ENCOUNTER
CW and JW (DOD),    Patient has acyclovir script for cold sores   Patient noting she has developed sores on her vagina- thinks it may be herpes  She has tried taking acyclovir for last day but it doesn't seem to be changing anything  Patient wondering if different medication or dosage recommended to treat  Requesting covering provider assist with PCP absence  Please advise    Thanks,  Evonne HYATT RN

## 2025-02-06 NOTE — TELEPHONE ENCOUNTER
JW,    Patient requesting refill as she thinks she may be low   Pended if approved  Declined scheduling clinic visit at this time  Wants to try medication first  Advised patient that if significant pain or fever develops patient is to go to UC/ED  Patient verbalized understanding and agrees with plan of care.   Will call back with new or worsening symptoms    Evonne Enciso RN

## 2025-02-13 DIAGNOSIS — G47.00 INSOMNIA, UNSPECIFIED TYPE: ICD-10-CM

## 2025-02-13 RX ORDER — TRAZODONE HYDROCHLORIDE 50 MG/1
50-100 TABLET ORAL AT BEDTIME
Qty: 180 TABLET | Refills: 1 | OUTPATIENT
Start: 2025-02-13

## 2025-02-25 ENCOUNTER — VIRTUAL VISIT (OUTPATIENT)
Dept: PHARMACY | Facility: CLINIC | Age: 85
End: 2025-02-25
Payer: COMMERCIAL

## 2025-02-25 DIAGNOSIS — R53.83 FATIGUE, UNSPECIFIED TYPE: ICD-10-CM

## 2025-02-25 DIAGNOSIS — E11.65 TYPE 2 DIABETES MELLITUS WITH HYPERGLYCEMIA, WITH LONG-TERM CURRENT USE OF INSULIN (H): Primary | ICD-10-CM

## 2025-02-25 DIAGNOSIS — Z79.4 TYPE 2 DIABETES MELLITUS WITH HYPERGLYCEMIA, WITH LONG-TERM CURRENT USE OF INSULIN (H): Primary | ICD-10-CM

## 2025-02-25 PROCEDURE — 99607 MTMS BY PHARM ADDL 15 MIN: CPT | Mod: 95

## 2025-02-25 PROCEDURE — 99606 MTMS BY PHARM EST 15 MIN: CPT | Mod: 95

## 2025-02-25 NOTE — PATIENT INSTRUCTIONS
"Recommendations from today's MTM visit:                                                    MTM (medication therapy management) is a service provided by a clinical pharmacist designed to help you get the most of out of your medicines.   Today we reviewed what your medicines are for, how to know if they are working, that your medicines are safe and how to make your medicine regimen as easy as possible.      Continue your current doses of your medications  Tell your Yaneli to remind you every night around the time that you eat dinner to take your short acting insulin  Check your blood pressures more frequently at home, we will chat about these the next time that I see you    Follow-up: 3/20 at 11AM for a video visit    It was great speaking with you today.  I value your experience and would be very thankful for your time in providing feedback in our clinic survey. In the next few days, you may receive an email or text message from [employer] with a link to a survey related to your  clinical pharmacist.\"     To schedule another MTM appointment, please call the clinic directly or you may call the MTM scheduling line at [phone] or toll-free at [phone].     My Clinical Pharmacist's contact information:                                                      Please feel free to contact me with any questions or concerns you have.      Meghan Pacheco, Ashok  Medication Therapy Management Resident, [facility]  Phone: [phone]    Sabrina Meek, Pharm.D., M.B.A., [facility]  Medication Therapy Management Pharmacist, [facility]  Phone: [phone]   "

## 2025-02-25 NOTE — PROGRESS NOTES
Medication Therapy Management (MTM) Encounter    ASSESSMENT:                            Medication Adherence/Access: See below for considerations.    Diabetes:   A1c not at goal <8%, SMBG monitoring not at fasting goal  mg/dL. Would benefit from better adherence to her dinner short acting insulin, would recommend setting an alarm to help her remember to take this.    Fatigue:  Could potentially be due to blood pressures not being at goal, would recommend checking more frequently at home then reassessing.    PLAN:                            Continue your current doses of your medications  Tell your Yaneli to remind you every night around the time that you eat dinner to take your short acting insulin  Check your blood pressures more frequently at home, we will chat about these the next time that I see you    Follow-up: 3/20 at 11AM for a video visit    SUBJECTIVE/OBJECTIVE:                          Betty Tee is a 84 year old female contacted via secure video for a follow-up visit. Patient was accompanied by caretaker Nghia for our visit today.      Reason for visit: diabetes follow-up.    Allergies/ADRs: Reviewed in chart  Past Medical History: Reviewed in chart  Tobacco: She reports that she quit smoking about 13 years ago. Her smoking use included cigarettes. She started smoking about 55 years ago. She has a 20.8 pack-year smoking history. She has never used smokeless tobacco.  Alcohol: no change from previous visits    Medication Adherence/Access: tends to forget her insulin with her dinner    Diabetes   Aspirin 81mg daily  Mounjaro 15 mg weekly - no side effects from increased dose  Lantus 24 units daily  Novolog (or humalog) sliding scale below twice daily (only eats twice daily), usually 8 units at a time, no use in the morning.   If -200 take 8 units  If -250 take 10 units  If -300 take 12 units  If -350 take 16 units  If BG >350 take 20 units    She notes no side effects from  the meds above. She confirms she is not experiencing low blood sugars.     Blood sugar monitorin-4 time(s) daily; Ranges: (patient reported) Fasting- 161 mg/dL today, 138, 179, 164, 147 mg/dL, she believes her higher readings correlate with when she is missing her dinner insulin dose  Eye exam is up to date  Foot exam: due    Fatigue:  Has been feeling very tired even after waking up from sleeping well overnight  Has not been checking blood pressures at home    ----------------      I spent 22 minutes with this patient today. All changes were made via collaborative practice agreement with Ilene Tristan MD. A copy of the visit note was provided to the patient's provider(s).    A summary of these recommendations was sent via [employer].    Meghan Pacheco PharmD  Medication Therapy Management Pharmacy Resident  [facility]    Preceptor cosignature: Betty Tee was seen independently by Dr. Pacheco. I have reviewed the assessment and plan. Sabrina Meek PharmD, JAMES, BCACP    Telemedicine Visit Details  The patient's medications can be safely assessed via a telemedicine encounter.  Type of service:  Video Conference via [email or web address]  Originating Location (pt. Location): Home    Distant Location (provider location):  On-site  Joined the call at 2025, 11:07:03 am.  Left the call at 2025, 11:29:05 am.  You were on the call for 22 minutes 1 second     Medication Therapy Recommendations  Type 2 diabetes mellitus with hyperglycemia, with long-term current use of insulin (H)   1 Current Medication: insulin lispro (HUMALOG KWIKPEN) 100 UNIT/ML (1 unit dial) KWIKPEN   Current Medication Sig: Sliding scale insulin; tests BG three times day If BG <150, no insulin given If -200 take 8 units If -250 take 10 units If -300 take 12 units If -350 take 16 units If BG >350 take 20 units   Rationale: Patient forgets to take - Adherence - Adherence   Recommendation: Provide  Adherence Intervention   Status: Patient Agreed - Adherence/Education   Identified Date: 2/25/2025 Completed Date: 2/25/2025   Note: Set alarm on Yaneli to remember dinner dose

## 2025-03-05 DIAGNOSIS — I50.30 (HFPEF) HEART FAILURE WITH PRESERVED EJECTION FRACTION (H): Primary | ICD-10-CM

## 2025-03-12 ENCOUNTER — OFFICE VISIT (OUTPATIENT)
Dept: CARDIOLOGY | Facility: CLINIC | Age: 85
End: 2025-03-12
Attending: STUDENT IN AN ORGANIZED HEALTH CARE EDUCATION/TRAINING PROGRAM
Payer: COMMERCIAL

## 2025-03-12 ENCOUNTER — LAB (OUTPATIENT)
Dept: LAB | Facility: CLINIC | Age: 85
End: 2025-03-12
Attending: STUDENT IN AN ORGANIZED HEALTH CARE EDUCATION/TRAINING PROGRAM
Payer: COMMERCIAL

## 2025-03-12 VITALS
WEIGHT: 182.7 LBS | OXYGEN SATURATION: 96 % | SYSTOLIC BLOOD PRESSURE: 132 MMHG | DIASTOLIC BLOOD PRESSURE: 72 MMHG | HEART RATE: 63 BPM | BODY MASS INDEX: 30.4 KG/M2

## 2025-03-12 DIAGNOSIS — E11.65 TYPE 2 DIABETES MELLITUS WITH HYPERGLYCEMIA, WITH LONG-TERM CURRENT USE OF INSULIN (H): ICD-10-CM

## 2025-03-12 DIAGNOSIS — I10 ESSENTIAL HYPERTENSION WITH GOAL BLOOD PRESSURE LESS THAN 140/90: ICD-10-CM

## 2025-03-12 DIAGNOSIS — I50.32 CHRONIC HEART FAILURE WITH PRESERVED EJECTION FRACTION (H): ICD-10-CM

## 2025-03-12 DIAGNOSIS — M10.9 ACUTE GOUT OF FOOT, UNSPECIFIED CAUSE, UNSPECIFIED LATERALITY: ICD-10-CM

## 2025-03-12 DIAGNOSIS — Z79.899 LONG-TERM USE OF PLAQUENIL: ICD-10-CM

## 2025-03-12 DIAGNOSIS — Z79.4 TYPE 2 DIABETES MELLITUS WITH HYPERGLYCEMIA, WITH LONG-TERM CURRENT USE OF INSULIN (H): ICD-10-CM

## 2025-03-12 DIAGNOSIS — I50.30 (HFPEF) HEART FAILURE WITH PRESERVED EJECTION FRACTION (H): ICD-10-CM

## 2025-03-12 DIAGNOSIS — M35.02 SJOGREN'S SYNDROME WITH LUNG INVOLVEMENT (H): ICD-10-CM

## 2025-03-12 DIAGNOSIS — E78.5 HYPERLIPIDEMIA LDL GOAL <100: ICD-10-CM

## 2025-03-12 LAB
ALBUMIN MFR UR ELPH: <6 MG/DL
ALBUMIN SERPL BCG-MCNC: 4.1 G/DL (ref 3.5–5.2)
ALBUMIN UR-MCNC: NEGATIVE MG/DL
ALP SERPL-CCNC: 69 U/L (ref 40–150)
ALT SERPL W P-5'-P-CCNC: 12 U/L (ref 0–50)
ANION GAP SERPL CALCULATED.3IONS-SCNC: 11 MMOL/L (ref 7–15)
APPEARANCE UR: CLEAR
AST SERPL W P-5'-P-CCNC: 19 U/L (ref 0–45)
BASOPHILS # BLD AUTO: 0.1 10E3/UL (ref 0–0.2)
BASOPHILS NFR BLD AUTO: 1 %
BILIRUB SERPL-MCNC: 0.6 MG/DL
BILIRUB UR QL STRIP: NEGATIVE
BUN SERPL-MCNC: 16.7 MG/DL (ref 8–23)
CALCIUM SERPL-MCNC: 9.7 MG/DL (ref 8.8–10.4)
CHLORIDE SERPL-SCNC: 105 MMOL/L (ref 98–107)
CHOLEST SERPL-MCNC: 123 MG/DL
COLOR UR AUTO: ABNORMAL
CREAT SERPL-MCNC: 1.07 MG/DL (ref 0.51–0.95)
CREAT UR-MCNC: 10.4 MG/DL
CREAT UR-MCNC: 10.6 MG/DL
CRP SERPL-MCNC: 12.6 MG/L
EGFRCR SERPLBLD CKD-EPI 2021: 51 ML/MIN/1.73M2
EOSINOPHIL # BLD AUTO: 0.2 10E3/UL (ref 0–0.7)
EOSINOPHIL NFR BLD AUTO: 2 %
ERYTHROCYTE [DISTWIDTH] IN BLOOD BY AUTOMATED COUNT: 14.5 % (ref 10–15)
ERYTHROCYTE [SEDIMENTATION RATE] IN BLOOD BY WESTERGREN METHOD: 18 MM/HR (ref 0–30)
FASTING STATUS PATIENT QL REPORTED: NO
FASTING STATUS PATIENT QL REPORTED: NO
GLUCOSE SERPL-MCNC: 246 MG/DL (ref 70–99)
GLUCOSE UR STRIP-MCNC: 70 MG/DL
HCO3 SERPL-SCNC: 26 MMOL/L (ref 22–29)
HCT VFR BLD AUTO: 39.8 % (ref 35–47)
HDLC SERPL-MCNC: 72 MG/DL
HGB BLD-MCNC: 13.2 G/DL (ref 11.7–15.7)
HGB UR QL STRIP: NEGATIVE
HYALINE CASTS: 3 /LPF
IMM GRANULOCYTES # BLD: 0 10E3/UL
IMM GRANULOCYTES NFR BLD: 1 %
KETONES UR STRIP-MCNC: NEGATIVE MG/DL
LDLC SERPL CALC-MCNC: 33 MG/DL
LEUKOCYTE ESTERASE UR QL STRIP: NEGATIVE
LYMPHOCYTES # BLD AUTO: 1.1 10E3/UL (ref 0.8–5.3)
LYMPHOCYTES NFR BLD AUTO: 13 %
MCH RBC QN AUTO: 30.2 PG (ref 26.5–33)
MCHC RBC AUTO-ENTMCNC: 33.2 G/DL (ref 31.5–36.5)
MCV RBC AUTO: 91 FL (ref 78–100)
MICROALBUMIN UR-MCNC: 19 MG/L
MICROALBUMIN/CREAT UR: 179.25 MG/G CR (ref 0–25)
MONOCYTES # BLD AUTO: 0.7 10E3/UL (ref 0–1.3)
MONOCYTES NFR BLD AUTO: 9 %
NEUTROPHILS # BLD AUTO: 6.2 10E3/UL (ref 1.6–8.3)
NEUTROPHILS NFR BLD AUTO: 75 %
NITRATE UR QL: NEGATIVE
NONHDLC SERPL-MCNC: 51 MG/DL
NRBC # BLD AUTO: 0 10E3/UL
NRBC BLD AUTO-RTO: 0 /100
PH UR STRIP: 5.5 [PH] (ref 5–7)
PLATELET # BLD AUTO: 179 10E3/UL (ref 150–450)
POTASSIUM SERPL-SCNC: 4.1 MMOL/L (ref 3.4–5.3)
PROT SERPL-MCNC: 7.2 G/DL (ref 6.4–8.3)
PROT/CREAT 24H UR: NORMAL MG/G{CREAT}
RBC # BLD AUTO: 4.37 10E6/UL (ref 3.8–5.2)
RBC URINE: 4 /HPF
SODIUM SERPL-SCNC: 142 MMOL/L (ref 135–145)
SP GR UR STRIP: 1.01 (ref 1–1.03)
TOTAL PROTEIN SERUM FOR ELP: 6.9 G/DL (ref 6.4–8.3)
TRIGL SERPL-MCNC: 89 MG/DL
TSH SERPL DL<=0.005 MIU/L-ACNC: 4.1 UIU/ML (ref 0.3–4.2)
URATE SERPL-MCNC: 4.9 MG/DL (ref 2.4–5.7)
UROBILINOGEN UR STRIP-MCNC: NORMAL MG/DL
WBC # BLD AUTO: 8.3 10E3/UL (ref 4–11)
WBC URINE: 7 /HPF

## 2025-03-12 PROCEDURE — 82043 UR ALBUMIN QUANTITATIVE: CPT | Performed by: FAMILY MEDICINE

## 2025-03-12 PROCEDURE — 81001 URINALYSIS AUTO W/SCOPE: CPT | Performed by: PATHOLOGY

## 2025-03-12 PROCEDURE — 99000 SPECIMEN HANDLING OFFICE-LAB: CPT | Performed by: PATHOLOGY

## 2025-03-12 PROCEDURE — 86140 C-REACTIVE PROTEIN: CPT | Performed by: PATHOLOGY

## 2025-03-12 PROCEDURE — 80061 LIPID PANEL: CPT | Performed by: PATHOLOGY

## 2025-03-12 PROCEDURE — 84165 PROTEIN E-PHORESIS SERUM: CPT | Mod: TC | Performed by: PATHOLOGY

## 2025-03-12 PROCEDURE — 84443 ASSAY THYROID STIM HORMONE: CPT | Performed by: PATHOLOGY

## 2025-03-12 PROCEDURE — 85025 COMPLETE CBC W/AUTO DIFF WBC: CPT | Performed by: PATHOLOGY

## 2025-03-12 PROCEDURE — 86160 COMPLEMENT ANTIGEN: CPT | Performed by: INTERNAL MEDICINE

## 2025-03-12 PROCEDURE — 80053 COMPREHEN METABOLIC PANEL: CPT | Performed by: PATHOLOGY

## 2025-03-12 PROCEDURE — 85652 RBC SED RATE AUTOMATED: CPT | Performed by: PATHOLOGY

## 2025-03-12 PROCEDURE — 84550 ASSAY OF BLOOD/URIC ACID: CPT | Performed by: PATHOLOGY

## 2025-03-12 PROCEDURE — 36415 COLL VENOUS BLD VENIPUNCTURE: CPT | Performed by: PATHOLOGY

## 2025-03-12 PROCEDURE — 84156 ASSAY OF PROTEIN URINE: CPT | Performed by: PATHOLOGY

## 2025-03-12 PROCEDURE — G0463 HOSPITAL OUTPT CLINIC VISIT: HCPCS | Performed by: PHYSICIAN ASSISTANT

## 2025-03-12 PROCEDURE — 84165 PROTEIN E-PHORESIS SERUM: CPT | Mod: 26 | Performed by: PATHOLOGY

## 2025-03-12 PROCEDURE — 82595 ASSAY OF CRYOGLOBULIN: CPT | Performed by: INTERNAL MEDICINE

## 2025-03-12 RX ORDER — DILTIAZEM HYDROCHLORIDE 120 MG/1
120 CAPSULE, EXTENDED RELEASE ORAL DAILY
Qty: 90 CAPSULE | Refills: 3 | Status: SHIPPED | OUTPATIENT
Start: 2025-03-12

## 2025-03-12 ASSESSMENT — PAIN SCALES - GENERAL: PAINLEVEL_OUTOF10: NO PAIN (0)

## 2025-03-12 NOTE — NURSING NOTE
Diet: Patient instructed regarding a heart healthy diet, including discussion of reduced fat and sodium intake. Patient demonstrated understanding of this information and agreed to call with further questions or concerns.  Labs: Patient was given results of the laboratory testing obtained today. Patient was instructed to return for the next laboratory testing in 3 months. Patient demonstrated understanding of this information and agreed to call with further questions or concerns.   Return Appointment: Patient given instructions regarding scheduling next clinic visit. Patient demonstrated understanding of this information and agreed to call with further questions or concerns.  CORE 3 months, Dr. Rosa 6 months     Medication Change: Patient was educated regarding prescribed medication change, including discussion of the indication, administration, side effects, and when to report to MD or RN. Patient demonstrated understanding of this information and agreed to call with further questions or concerns.  Stop Metoprolol after Sunday. Monday start Diltiazem 120 mg daily.     RN call end of next week to check in on heart rates.   Friend Nghia needs to be notified of any medication changes.     Patient stated she understood all health information given and agreed to call with further questions or concerns.

## 2025-03-12 NOTE — PROGRESS NOTES
[facility] Cardiology   [facility]      HPI:   Ms. Tee is a 84 year old female with medical history pertinent for interstitial lung disease (moderate restriction), Sjogren's syndrome, HTN, hx GIB, atrial fibrillation s/p Watchman, diverticulitis s/p colectomy 2011, and HFpEF. She presents to [facility] for routine follow up.     With regards to HFpEF, echo demonstrates mild LA enlargement, increase LV filling pressures, and by labs she has had an increased nt-bnp. She has been on spironolactone and torsemide. Lucillee is a primary patient of Dr. Carmona but saw Dr. Moura 9/12/24- she was doing well at that time- metoprolol was decreased.    Today  Today, Ms. Tee reports feeling about the same. She still feels short of breaht with pretty limited wakling.  This is very stable for her- she is able to manage. Mild LE edema. Not sure about abdominal edema. No orthopnea or PND. She does feel fatigued in the morning, it is nat on nights she wears the CPAP, but its not toally better when she does. She gets lightheadeded/dizzy when she turns her head really quickly and that has led to falls. No other lightheadedness. No chest pain     Cardiac Medications   - ASA 81 mg daily   - Atorvastatin 5 mg daily  - Metoprolol succinate 50 mg BID  - Torsemide 40/20 mg BID      PAST MEDICAL HISTORY:  Past Medical History:   Diagnosis Date    Alcohol abuse, in remission     Allergic rhinitis, cause unspecified     allegra helps when she takes it    Antiplatelet or antithrombotic long-term use     Atrial fibrillation (H)     in hosp in 11/11 after surgery w/ fluid overload    Cardiomegaly     LVH on stress echo- cardiac w/u at [facility] ER- neg CT scan for PE, neg stress echo in 8/06    Chest pain, unspecified     Congestive heart failure (H)     Depressive disorder     Diabetes (H)     Disorder of bone and cartilage, unspecified     osteopenia (had been on prempro), improved on 6/06 dexa, stable dexa 11/10    Diverticulosis of colon  (without mention of hemorrhage)     last episode yrs ago    Essential hypertension, benign     Follicular bronchiolitis (H)     associated with Sjogrens, dx by chest CT showing mosaic attenuation and air trapping    Gastro-oesophageal reflux disease     ILD (interstitial lung disease) (H)     associated with Sjogrens, also has mildly elevated IgG4, first noted on chest CT 2015 (mild changes) and also has small airways disease; ILD improved on follow up chest CT 2018.    Insomnia, unspecified     weaned off clonazepam    Irregular heart beat     Lumbago 07/2009    MRI with NEAL, now seeing Dr. Cain for sciatic sx's    Major depressive disorder, recurrent episode, moderate (H)     Obstructive sleep apnea     uses cpap    Osteoarthrosis, unspecified whether generalized or localized, unspecified site     Sjogren's syndrome     + RG and SSA and lip bx    Sleep apnea     uses a cpap machine    Tobacco use disorder     chantix in 9/07, started again in 6/08, working       FAMILY HISTORY:  Family History   Problem Relation Age of Onset    C.A.D. Mother 63        MI- first at age 63    Heart Disease Mother     Hypertension Mother     Cerebrovascular Disease Mother     Hyperlipidemia Mother     Coronary Artery Disease Mother         MI-first at age 63    Other - See Comments Mother         cerebrovascular disease     Alcohol/Drug Father     Alzheimer Disease Father     Dementia Father     Hypertension Father     Hyperlipidemia Father     Substance Abuse Father     Diabetes Sister     Hypertension Sister     Hyperlipidemia Sister     Substance Abuse Sister     Asthma Sister     C.A.D. Sister 52        Minor MI- age 50's    Heart Disease Sister     Hypertension Sister     Hypertension Sister     Cancer - colorectal Sister 48        Late 40's early 50's    Gastrointestinal Disease Sister         Diverticulitis    Lipids Sister     Lipids Sister     Diabetes Sister     Heart Disease Sister         CHF    Cancer Sister          lung, smoker    Substance Abuse Sister     Asthma Sister     Cancer Sister     Coronary Artery Disease Sister         minor MI-age 50's    Heart Disease Sister     Hypertension Sister     Hypertension Sister     Colon Cancer Sister     Diverticulitis Sister     Lung Cancer Sister     Heart Disease Sister         CHF    Diabetes Sister     Asthma Sister     Hypertension Brother     Prostate Cancer Brother 74        Dx'd age 74    Gastrointestinal Disease Brother         Diverticulitis    Parkinsonism Brother     Substance Abuse Brother     Prostate Cancer Brother     Hyperlipidemia Brother     Hypertension Brother     Prostate Cancer Brother     Diverticulitis Brother     Parkinsonism Brother     Breast Cancer Daughter     Breast Cancer Daughter     Diabetes Other     Hypertension Other     Anesthesia Reaction No family hx of     Deep Vein Thrombosis (DVT) No family hx of        SOCIAL HISTORY:  Social History     Socioeconomic History    Marital status: Single    Number of children: 0    Years of education: Ed Spec De   Occupational History    Occupation: Professor     Employer: [employer]     Comment: [employer]- Education   Tobacco Use    Smoking status: Former     Packs/day: 0.50     Types: Cigarettes     Start date:      Quit date: 2011     Years since quittin.8    Smokeless tobacco: Never    Tobacco comments:      ppd   Vaping Use    Vaping status: Never Used   Substance and Sexual Activity    Alcohol use: No     Alcohol/week: 0.0 standard drinks of alcohol     Comment: In recovery beginning     Drug use: No    Sexual activity: Never   Other Topics Concern    Parent/sibling w/ CABG, MI or angioplasty before 65F 55M? Yes   Social History Narrative                   CURRENT MEDICATIONS:  Current Outpatient Medications   Medication Sig Dispense Refill    acyclovir (ZOVIRAX) 5 % external ointment Apply topically as needed (6 times day PRN for outbreaks). As  needed for outbreaks 15 g 2    albuterol (PROAIR HFA/PROVENTIL HFA/VENTOLIN HFA) 108 (90 Base) MCG/ACT inhaler Inhale 2 puffs into the lungs every 6 hours as needed for wheezing or shortness of breath      anakinra (KINERET) 100 MG/0.67ML SOSY injection Inject 0.67 mLs (100 mg) subcutaneously daily. 20.1 mL 3    aspirin (ASA) 81 MG EC tablet Take 1 tablet (81 mg) by mouth 2 times daily 60 tablet 0    atorvastatin (LIPITOR) 10 MG tablet Take 0.5 tablets (5 mg) by mouth daily. 45 tablet 1    azithromycin (ZITHROMAX) 250 MG tablet TAKE 1 TABLET BY MOUTH EVERY DAY 30 tablet 11    blood glucose (ACCU-CHEK SHANNON PLUS) test strip USE TO TEST BLOOD SUGARS 3 TIMES DAILY 300 strip 1    blood glucose (NO BRAND SPECIFIED) lancets standard Use to test blood sugar 3 times daily or as directed 100 Lancet 3    BREO ELLIPTA 100-25 MCG/ACT inhaler TAKE 1 PUFF BY MOUTH EVERY DAY 1 each 11    cevimeline (EVOXAC) 30 MG capsule Take 1 capsule (30 mg) by mouth 3 times daily as needed 270 capsule 3    Cholecalciferol (VITAMIN D3) 50 MCG (2000 UT) CAPS TAKE 100 MCG BY MOUTH DAILY (TAKE 2 TABLET (50 MCG) BY MOUTH DAILY - ORAL) 180 capsule 2    COMPOUNDED NON-CONTROLLED SUBSTANCE (CMPD RX) - PHARMACY TO MIX COMPOUNDED MEDICATION Estriol 1 mg/g Apply small amount to finger and apply to inside vagina daily for 2 weeks then twice weekly Route: vaginally Dispense 30 grams 11 refills 30 g 11    cyanocobalamin (VITAMIN B-12) 1000 MCG tablet Take 1 tablet (1,000 mcg) by mouth three times a week      diltiazem ER (DILT-XR) 120 MG 24 hr capsule Take 1 capsule (120 mg) by mouth daily. 90 capsule 3    escitalopram (LEXAPRO) 20 MG tablet TAKE 1 TABLET BY MOUTH EVERY DAY 90 tablet 1    fluticasone (FLONASE) 50 MCG/ACT nasal spray SPRAY 1-2 SPRAYS INTO BOTH NOSTRILS DAILY AS NEEDED FOR ALLERGIES 16 mL 11    hydroxychloroquine (PLAQUENIL) 200 MG tablet TAKE 1 TABLET (200 MG) BY MOUTH DAILY GET ANNUAL EYE EXAMS FOR MONITORING AND FAX [phone] 90  tablet 3    insulin glargine (LANTUS SOLOSTAR) 100 UNIT/ML pen Inject 24 Units subcutaneously at bedtime. 30 mL 0    insulin lispro (HUMALOG KWIKPEN) 100 UNIT/ML (1 unit dial) KWIKPEN Sliding scale insulin; tests BG three times day If BG <150, no insulin given If -200 take 8 units If -250 take 10 units If -300 take 12 units If -350 take 16 units If BG >350 take 20 units 15 mL 1    insulin pen needle (BD PEN NEEDLE JANE 2ND GEN) 32G X 4 MM miscellaneous USE TO TEST 4 TIMES A  each 1    ketoconazole (NIZORAL) 2 % external cream Apply topically 2 times daily as needed for itching 60 g 1    levocetirizine (XYZAL) 5 MG tablet TAKE 1 TABLET BY MOUTH EVERY DAY IN THE EVENING 90 tablet 1    lidocaine (LIDODERM) 5 % patch APPLY PATCH TO PAINFUL AREA FOR UP TO 12 H WITHIN A 24 H PERIOD. REMOVE AFTER 12 HOURS. 30 patch 2    loratadine (CLARITIN) 10 MG tablet TAKE 1 TABLET BY MOUTH EVERY DAY 90 tablet 2    methocarbamol (ROBAXIN) 750 MG tablet Take 1 tablet (750 mg) by mouth 3 times daily as needed for muscle spasms 90 tablet 3    mirabegron (MYRBETRIQ) 25 MG 24 hr tablet Take 1 tablet (25 mg) by mouth daily. For additional refills, please schedule a follow-up appointment. 30 tablet 1    montelukast (SINGULAIR) 10 MG tablet TAKE 1 TABLET BY MOUTH EVERYDAY AT BEDTIME 90 tablet 3    MOUNJARO 15 MG/0.5ML SOAJ Inject 0.5 mLs (15 mg) subcutaneously every 7 days. 6 mL 3    oxyCODONE-acetaminophen (PERCOCET) 5-325 MG tablet Take 1 tablet by mouth every 8 hours as needed for pain. 60 tablet 0    pantoprazole (PROTONIX) 40 MG EC tablet Take 1 tablet (40 mg) by mouth daily (replaces famotidine- should stop taking famotidine) 90 tablet 3    polyethylene glycol (MIRALAX) 17 g packet Take 17 g by mouth daily 10 packet 0    potassium chloride ashwini ER (KLOR-CON M20) 20 MEQ CR tablet TAKE 2 TABLETS (40 MEQ) BY MOUTH EVERY MORNING AND 1 TABLET (20 MEQ) EVERY EVENING. 270 tablet 3    predniSONE (DELTASONE) 5 MG  tablet Take 1 tablet (5 mg) by mouth daily. 90 tablet 1    torsemide (DEMADEX) 20 MG tablet TAKE 2 TABLETS (40 MG) BY MOUTH EVERY MORNING AND 1 TABLET (20 MG) DAILY AT 2 PM. TAKE THE AFTERNOON DOSE WITH AN EXTRA 10 MG TAB FOR A TOTAL OF 30 MG IN THE AFTERNOON 270 tablet 3    traZODone (DESYREL) 50 MG tablet Take 1-2 tablets ( mg) by mouth at bedtime. 90 tablet 0    acyclovir (ZOVIRAX) 400 MG tablet Take 1 tablet (400 mg) by mouth every 8 hours for 7 days. For herpes outbreak 21 tablet 0    allopurinol (ZYLOPRIM) 100 MG tablet TAKE 1 TABLET BY MOUTH EVERY DAY (Patient not taking: Reported on 3/12/2025) 90 tablet 1     Current Facility-Administered Medications   Medication Dose Route Frequency Provider Last Rate Last Admin    denosumab (PROLIA) injection 60 mg  60 mg Subcutaneous Q6 Months    60 mg at 01/17/25 1528       ROS:   Refer to HPI    EXAM:  /72 (BP Location: Left arm, Patient Position: Chair, Cuff Size: Adult Large)   Pulse 63   Wt 82.9 kg (182 lb 11.2 oz)   LMP  (LMP Unknown)   SpO2 96%   BMI 30.40 kg/m     GENERAL: Appears comfortable, in no acute distress.   HEENT: Eye symmetrical, no discharge or icterus bilaterally.   CV: RRR, +S1S2, no murmur, rub, or gallop. JVP < 6 cm.   RESPIRATORY: Respirations regular, even, and unlabored. Lungs CTA throughout.   GI: Soft and non distended   EXTREMITIES: no peripheral edema.   NEUROLOGIC: All extremities are warm and well perfused  SKIN: No jaundice.     Labs, reviewed with patient in clinic today:  CBC RESULTS:  Lab Results   Component Value Date    WBC 8.3 03/12/2025    WBC 8.6 06/04/2021    RBC 4.37 03/12/2025    RBC 4.46 06/04/2021    HGB 13.2 03/12/2025    HGB 13.4 06/04/2021    HCT 39.8 03/12/2025    HCT 42.1 06/04/2021    MCV 91 03/12/2025    MCV 94 06/04/2021    MCH 30.2 03/12/2025    MCH 30.0 06/04/2021    MCHC 33.2 03/12/2025    MCHC 31.8 06/04/2021    RDW 14.5 03/12/2025    RDW 15.5 (H) 06/04/2021     03/12/2025      06/04/2021       CMP RESULTS:  Lab Results   Component Value Date     03/12/2025     06/04/2021    POTASSIUM 4.1 03/12/2025    POTASSIUM 4.1 08/23/2022    POTASSIUM 4.0 06/04/2021    CHLORIDE 105 03/12/2025    CHLORIDE 107 08/23/2022    CHLORIDE 106 06/04/2021    CO2 26 03/12/2025    CO2 22 08/23/2022    CO2 25 06/04/2021    ANIONGAP 11 03/12/2025    ANIONGAP 8 08/23/2022    ANIONGAP 6 06/04/2021     (H) 03/12/2025     (H) 06/05/2023     (H) 08/23/2022     (H) 06/04/2021    BUN 16.7 03/12/2025    BUN 17 08/23/2022    BUN 14 06/04/2021    CR 1.07 (H) 03/12/2025    CR 1.11 (H) 06/04/2021    GFRESTIMATED 51 (L) 03/12/2025    GFRESTIMATED 47 (L) 06/04/2021    GFRESTBLACK 54 (L) 06/04/2021    CLAUDY 9.7 03/12/2025    CLAUDY 8.8 06/04/2021    BILITOTAL 0.6 03/12/2025    BILITOTAL 0.6 12/04/2020    ALBUMIN 4.1 03/12/2025    ALBUMIN 3.2 (L) 08/23/2022    ALBUMIN 3.5 06/04/2021    ALKPHOS 69 03/12/2025    ALKPHOS 95 12/04/2020    ALT 12 03/12/2025    ALT 25 06/04/2021    AST 19 03/12/2025    AST 17 06/04/2021        INR RESULTS:  Lab Results   Component Value Date    INR 1.43 (H) 11/10/2022    INR 1.36 (H) 03/03/2020       Lab Results   Component Value Date    MAG 2.0 06/01/2023    MAG 2.0 04/11/2017     Lab Results   Component Value Date    NTBNPI 3,531 (H) 04/06/2017     Lab Results   Component Value Date    NTBNP 436 09/12/2024    NTBNP 176 08/27/2020       Cardiac Diagnostics:   9/12/24 ECHO  Interpretation Summary  Global and regional left ventricular function is normal with an EF of 60-65%.  Diastolic Doppler findings (E/E' ratio and/or other parameters) suggest left  ventricular filling pressures are increased.  Global right ventricular function is normal.  The inferior vena cava was normal in size with preserved respiratory  variability.     This study was compared with the study from 9/7/2023. There are no significant  changes.    9/9/2021 Echo  Interpretation Summary  Global and  regional left ventricular function is normal with an EF of 55-60%.  The right ventricle is normal size. Global right ventricular function is  normal.  No significant valvular abnormalities.  IVC diameter >2.1 cm collapsing <50% with sniff suggests a high RA pressure  estimated at 15 mmHg or greater.  This study was compared with the study from 08/27/2020. The LA filling  pressures are higher. There are no significant changes in the biventricular  function or aortic calibers.        Assessment and Plan:   Ms. Tee is a 84 year old female with medical history pertinent for interstitial lung disease (moderate restriction), Sjogren's syndrome, HTN, atrial fibrillation s/p Watchman, diverticulitis s/p colectomy 2011, and HFpEF. [place] [facility] [place].     No acute concerns. Appears euvolemic by exam. Blood pressure well controlled. Review of BMP with stable renal function and normal lytes. We did discuss that there is a harm signal of BB in hfpef and diltiazem can sometimes be better tolerated. The potential risk is that she has RVR during that transition or side effects from the new dose. She would like to try given SOB is her biggest symptom right now (stable but limiting) so we will make that change today. We are going to aim for a low diltiazem dose and then will increase next week if possible.     # Chronic diastolic heart failure/HFpEF (EF 55-60%)  Risk factors include female gender, age, HTN, diabetes, sleep apnea, obesity and arrhythmia.   Stage C. NYHA Class III    Primary Cardiologist: Dr. Rosa; Last seen 4/2023 (Dr. Moura saw the patient for coverage 9/12/24)  BP: controlled   Fluid status: Euvolemic- continue torsemide 40 mg daily and 20 mg daily  Aldosterone antagonist: stopped previously d/t hypotension  SGLT2i: may consider starting in the future  Ischemia evaluation: Complete NM stress negative (6/2014)  ACEi/ARB/ARNI: n/a, no evidence for use in HFpEF  BB: n/a, no evidence  for use in HFpEF, BB for AF- switching to diltiazem (see below)   SCD prophylaxis: n/a, no evidence for use in HFpEF  NSAID use: contraindicated  Sleep apnea: CPAP compliant     # Paroxysmal Atrial fibrillation, s/p Watchman 9/2022  [id number] (age >75, HTN, CHF, gender)   - ASA 81 mg daily  - Stop metoprolol 50 mg BID  - Start diltiazem  mg daily and plan to increase in 1 week if tolerating     # Primary prevention  - Continue statin and ASA       Follow-up  - RN phone call in 1 week- check in on Hrs and symptoms- if tolerating dilt will increase the dose as I think se will need more to equal th coverage she was getting from metoprolol)  - [facility] in 3 months with labs  - Dr. Rosa in 6 month with labs    Billing  - I managed 2+ stable chronic conditions  - I changed a prescription medication    Billing  - I managed 2+ stable chronic conditions  - I changed a prescription medication      Neela Jones PA-C  [facility] Failure

## 2025-03-12 NOTE — PROGRESS NOTES
Called Sister Sita back to answer questions on BNP and CRP. Reviewed CRP was checked to evaluate chronic inflammation in the setting of ILD and Sjogren's with pleurisy.Is actually lower then previous check. Explained BNP is one marker we use to evaluate heart failure and fluid status and while this last check was slightly higher then previous, she was just evaluated in clinic and this is awhy we increase Torsemide. Patient stated understanding.     Beatriz Blanco RN      stated

## 2025-03-12 NOTE — PATIENT INSTRUCTIONS
Take your medicines every day, as directed     Changes made today:    - Stay on metoprolol 50 mg twice a day through Sunday night    - Starting Monday Morning, STOP Metoprolol and start Diltiazem 120 mg daily - sent to [facility] [address] [place]    Monitor Your Weight and Symptoms     Contact us if you:     Gain 2 pounds in one day or 5 pounds in one week  Feel more short of breath  Notice more leg swelling  Feel lightheadeded    Change your lifestyle     Limit Salt or Sodium:  2000 mg  Limit Fluids:  2000 mL or approximately 64 ounces  Eat a Heart Healthy Diet  Low in saturated fats  Stay Active:  Aim to move at least 150 minutes every  week            To Contact us     During Business Hours:  [phone], option # 1      After hours, weekends or holidays:   [phone], Option #4  Ask to speak to the On-Call Cardiologist. Inform them you are a CORE/heart failure patient at the [facility].       Use [email or web address] allows you to communicate directly with your heart team through secure messaging.  [email or web address] can be accessed any time on your phone, computer, or tablet.  If you need assistance, we'd be happy to help!             Keep your Heart Appointments:     - Check your pulse (heart rate) with the nurse on Tuesday and Thursday    - RN phone call next Friday to see how you're feeling    - [facility] clinic in 3 months with labs    - Dr. Rosa in 6 months with labs      Please consider attending our virtual support group which is held monthly. Please reach out to Siddharth at [phone] for more information if you are interested in attending.

## 2025-03-12 NOTE — LETTER
3/12/2025      RE: Betty Tee  [address]  [place] [address]       Dear Colleague,    Thank you for the opportunity to participate in the care of your patient, Betty Tee, at the [facility] [place] [facility]. Please see a copy of my visit note below.      [facility]   [facility]      HPI:   Ms. Tee is a 84 year old female with medical history pertinent for interstitial lung disease (moderate restriction), Sjogren's syndrome, HTN, hx GIB, atrial fibrillation s/p Watchman, diverticulitis s/p colectomy 2011, and HFpEF. [place] [facility] [place]     With regards to HFpEF, echo demonstrates mild LA enlargement, increase LV filling pressures, and by labs she has had an increased nt-bnp. She has been on spironolactone and torsemide. Shhe is a primary patient of Dr. Carmona but saw Dr. Moura 9/12/24- she was doing well at that time- metoprolol was decreased.    Today  Today, Ms. Tee reports feeling about the same. She still feels short of breaht with pretty limited wakling.  This is very stable for her- she is able to manage. Mild LE edema. Not sure about abdominal edema. No orthopnea or PND. She does feel fatigued in the morning, it is nat on nights she wears the CPAP, but its not toally better when she does. She gets lightheadeded/dizzy when she turns her head really quickly and that has led to falls. No other lightheadedness. No chest pain     Cardiac Medications   - ASA 81 mg daily   - Atorvastatin 5 mg daily  - Metoprolol succinate 50 mg BID  - Torsemide 40/20 mg BID      PAST MEDICAL HISTORY:  Past Medical History:   Diagnosis Date     Alcohol abuse, in remission      Allergic rhinitis, cause unspecified     allegra helps when she takes it     Antiplatelet or antithrombotic long-term use      Atrial fibrillation (H)     in hosp in 11/11 after surgery w/ fluid overload      Cardiomegaly     LVH on stress echo- cardiac w/u at [facility] ER- neg CT scan for PE, neg stress echo in 8/06     Chest pain, unspecified      Congestive heart failure (H)      Depressive disorder      Diabetes (H)      Disorder of bone and cartilage, unspecified     osteopenia (had been on prempro), improved on 6/06 dexa, stable dexa 11/10     Diverticulosis of colon (without mention of hemorrhage)     last episode yrs ago     Essential hypertension, benign      Follicular bronchiolitis (H)     associated with Sjogrens, dx by chest CT showing mosaic attenuation and air trapping     Gastro-oesophageal reflux disease      ILD (interstitial lung disease) (H)     associated with Sjogrens, also has mildly elevated IgG4, first noted on chest CT 2015 (mild changes) and also has small airways disease; ILD improved on follow up chest CT 2018.     Insomnia, unspecified     weaned off clonazepam     Irregular heart beat      Lumbago 07/2009    MRI with DJD, now seeing Dr. Cain for sciatic sx's     Major depressive disorder, recurrent episode, moderate (H)      Obstructive sleep apnea     uses cpap     Osteoarthrosis, unspecified whether generalized or localized, unspecified site      Sjogren's syndrome     + RG and SSA and lip bx     Sleep apnea     uses a cpap machine     Tobacco use disorder     chantix in 9/07, started again in 6/08, working       FAMILY HISTORY:  Family History   Problem Relation Age of Onset     C.A.D. Mother 63        MI- first at age 63     Heart Disease Mother      Hypertension Mother      Cerebrovascular Disease Mother      Hyperlipidemia Mother      Coronary Artery Disease Mother         MI-first at age 63     Other - See Comments Mother         cerebrovascular disease      Alcohol/Drug Father      Alzheimer Disease Father      Dementia Father      Hypertension Father      Hyperlipidemia Father      Substance Abuse Father      Diabetes Sister      Hypertension Sister      Hyperlipidemia Sister       Substance Abuse Sister      Asthma Sister      C.A.D. Sister 52        Minor MI- age 50's     Heart Disease Sister      Hypertension Sister      Hypertension Sister      Cancer - colorectal Sister 48        Late 40's early 50's     Gastrointestinal Disease Sister         Diverticulitis     Lipids Sister      Lipids Sister      Diabetes Sister      Heart Disease Sister         CHF     Cancer Sister         lung, smoker     Substance Abuse Sister      Asthma Sister      Cancer Sister      Coronary Artery Disease Sister         minor MI-age 50's     Heart Disease Sister      Hypertension Sister      Hypertension Sister      Colon Cancer Sister      Diverticulitis Sister      Lung Cancer Sister      Heart Disease Sister         CHF     Diabetes Sister      Asthma Sister      Hypertension Brother      Prostate Cancer Brother 74        Dx'd age 74     Gastrointestinal Disease Brother         Diverticulitis     Parkinsonism Brother      Substance Abuse Brother      Prostate Cancer Brother      Hyperlipidemia Brother      Hypertension Brother      Prostate Cancer Brother      Diverticulitis Brother      Parkinsonism Brother      Breast Cancer Daughter      Breast Cancer Daughter      Diabetes Other      Hypertension Other      Anesthesia Reaction No family hx of      Deep Vein Thrombosis (DVT) No family hx of        SOCIAL HISTORY:  Social History     Socioeconomic History     Marital status: Single     Number of children: 0     Years of education: Ed Spec De   Occupational History     Occupation: Professor     Employer: [employer]     Comment: [employer]- Education   Tobacco Use     Smoking status: Former     Packs/day: 0.50     Types: Cigarettes     Start date:      Quit date: 2011     Years since quittin.8     Smokeless tobacco: Never     Tobacco comments:     /2 ppd   Vaping Use     Vaping status: Never Used   Substance and Sexual Activity     Alcohol use: No      Alcohol/week: 0.0 standard drinks of alcohol     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     Drug use: No     Sexual activity: Never   Other Topics Concern     Parent/sibling w/ CABG, MI or angioplasty before 65F 55M? Yes   Social History Narrative                   CURRENT MEDICATIONS:  Current Outpatient Medications   Medication Sig Dispense Refill     acyclovir (ZOVIRAX) 5 % external ointment Apply topically as needed (6 times day PRN for outbreaks). As needed for outbreaks 15 g 2     albuterol (PROAIR HFA/PROVENTIL HFA/VENTOLIN HFA) 108 (90 Base) MCG/ACT inhaler Inhale 2 puffs into the lungs every 6 hours as needed for wheezing or shortness of breath       anakinra (KINERET) 100 MG/0.67ML SOSY injection Inject 0.67 mLs (100 mg) subcutaneously daily. 20.1 mL 3     aspirin (ASA) 81 MG EC tablet Take 1 tablet (81 mg) by mouth 2 times daily 60 tablet 0     atorvastatin (LIPITOR) 10 MG tablet Take 0.5 tablets (5 mg) by mouth daily. 45 tablet 1     azithromycin (ZITHROMAX) 250 MG tablet TAKE 1 TABLET BY MOUTH EVERY DAY 30 tablet 11     blood glucose (ACCU-CHEK SHANNON PLUS) test strip USE TO TEST BLOOD SUGARS 3 TIMES DAILY 300 strip 1     blood glucose (NO BRAND SPECIFIED) lancets standard Use to test blood sugar 3 times daily or as directed 100 Lancet 3     BREO ELLIPTA 100-25 MCG/ACT inhaler TAKE 1 PUFF BY MOUTH EVERY DAY 1 each 11     cevimeline (EVOXAC) 30 MG capsule Take 1 capsule (30 mg) by mouth 3 times daily as needed 270 capsule 3     Cholecalciferol (VITAMIN D3) 50 MCG (2000 UT) CAPS TAKE 100 MCG BY MOUTH DAILY (TAKE 2 TABLET (50 MCG) BY MOUTH DAILY - ORAL) 180 capsule 2     COMPOUNDED NON-CONTROLLED SUBSTANCE (CMPD RX) - PHARMACY TO MIX COMPOUNDED MEDICATION Estriol 1 mg/g Apply small amount to finger and apply to inside vagina daily for 2 weeks then twice weekly Route: vaginally Dispense 30 grams 11 refills 30 g 11     cyanocobalamin (VITAMIN B-12) 1000 MCG tablet Take 1 tablet (1,000 mcg) by mouth three  times a week       diltiazem ER (DILT-XR) 120 MG 24 hr capsule Take 1 capsule (120 mg) by mouth daily. 90 capsule 3     escitalopram (LEXAPRO) 20 MG tablet TAKE 1 TABLET BY MOUTH EVERY DAY 90 tablet 1     fluticasone (FLONASE) 50 MCG/ACT nasal spray SPRAY 1-2 SPRAYS INTO BOTH NOSTRILS DAILY AS NEEDED FOR ALLERGIES 16 mL 11     hydroxychloroquine (PLAQUENIL) 200 MG tablet TAKE 1 TABLET (200 MG) BY MOUTH DAILY GET ANNUAL EYE EXAMS FOR MONITORING AND FAX [phone] 90 tablet 3     insulin glargine (LANTUS SOLOSTAR) 100 UNIT/ML pen Inject 24 Units subcutaneously at bedtime. 30 mL 0     insulin lispro (HUMALOG KWIKPEN) 100 UNIT/ML (1 unit dial) KWIKPEN Sliding scale insulin; tests BG three times day If BG <150, no insulin given If -200 take 8 units If -250 take 10 units If -300 take 12 units If -350 take 16 units If BG >350 take 20 units 15 mL 1     insulin pen needle (BD PEN NEEDLE JANE 2ND GEN) 32G X 4 MM miscellaneous USE TO TEST 4 TIMES A  each 1     ketoconazole (NIZORAL) 2 % external cream Apply topically 2 times daily as needed for itching 60 g 1     levocetirizine (XYZAL) 5 MG tablet TAKE 1 TABLET BY MOUTH EVERY DAY IN THE EVENING 90 tablet 1     lidocaine (LIDODERM) 5 % patch APPLY PATCH TO PAINFUL AREA FOR UP TO 12 H WITHIN A 24 H PERIOD. REMOVE AFTER 12 HOURS. 30 patch 2     loratadine (CLARITIN) 10 MG tablet TAKE 1 TABLET BY MOUTH EVERY DAY 90 tablet 2     methocarbamol (ROBAXIN) 750 MG tablet Take 1 tablet (750 mg) by mouth 3 times daily as needed for muscle spasms 90 tablet 3     mirabegron (MYRBETRIQ) 25 MG 24 hr tablet Take 1 tablet (25 mg) by mouth daily. For additional refills, please schedule a follow-up appointment. 30 tablet 1     montelukast (SINGULAIR) 10 MG tablet TAKE 1 TABLET BY MOUTH EVERYDAY AT BEDTIME 90 tablet 3     MOUNJARO 15 MG/0.5ML SOAJ Inject 0.5 mLs (15 mg) subcutaneously every 7 days. 6 mL 3     oxyCODONE-acetaminophen (PERCOCET) 5-325 MG tablet  Take 1 tablet by mouth every 8 hours as needed for pain. 60 tablet 0     pantoprazole (PROTONIX) 40 MG EC tablet Take 1 tablet (40 mg) by mouth daily (replaces famotidine- should stop taking famotidine) 90 tablet 3     polyethylene glycol (MIRALAX) 17 g packet Take 17 g by mouth daily 10 packet 0     potassium chloride ashwini ER (KLOR-CON M20) 20 MEQ CR tablet TAKE 2 TABLETS (40 MEQ) BY MOUTH EVERY MORNING AND 1 TABLET (20 MEQ) EVERY EVENING. 270 tablet 3     predniSONE (DELTASONE) 5 MG tablet Take 1 tablet (5 mg) by mouth daily. 90 tablet 1     torsemide (DEMADEX) 20 MG tablet TAKE 2 TABLETS (40 MG) BY MOUTH EVERY MORNING AND 1 TABLET (20 MG) DAILY AT 2 PM. TAKE THE AFTERNOON DOSE WITH AN EXTRA 10 MG TAB FOR A TOTAL OF 30 MG IN THE AFTERNOON 270 tablet 3     traZODone (DESYREL) 50 MG tablet Take 1-2 tablets ( mg) by mouth at bedtime. 90 tablet 0     acyclovir (ZOVIRAX) 400 MG tablet Take 1 tablet (400 mg) by mouth every 8 hours for 7 days. For herpes outbreak 21 tablet 0     allopurinol (ZYLOPRIM) 100 MG tablet TAKE 1 TABLET BY MOUTH EVERY DAY (Patient not taking: Reported on 3/12/2025) 90 tablet 1     Current Facility-Administered Medications   Medication Dose Route Frequency Provider Last Rate Last Admin     denosumab (PROLIA) injection 60 mg  60 mg Subcutaneous Q6 Months    60 mg at 01/17/25 1528       ROS:   Refer to HPI    EXAM:  /72 (BP Location: Left arm, Patient Position: Chair, Cuff Size: Adult Large)   Pulse 63   Wt 82.9 kg (182 lb 11.2 oz)   LMP  (LMP Unknown)   SpO2 96%   BMI 30.40 kg/m     GENERAL: Appears comfortable, in no acute distress.   HEENT: Eye symmetrical, no discharge or icterus bilaterally.   CV: RRR, +S1S2, no murmur, rub, or gallop. JVP < 6 cm.   RESPIRATORY: Respirations regular, even, and unlabored. Lungs CTA throughout.   GI: Soft and non distended   EXTREMITIES: no peripheral edema.   NEUROLOGIC: All extremities are warm and well perfused  SKIN: No jaundice.     Labs,  reviewed with patient in clinic today:  CBC RESULTS:  Lab Results   Component Value Date    WBC 8.3 03/12/2025    WBC 8.6 06/04/2021    RBC 4.37 03/12/2025    RBC 4.46 06/04/2021    HGB 13.2 03/12/2025    HGB 13.4 06/04/2021    HCT 39.8 03/12/2025    HCT 42.1 06/04/2021    MCV 91 03/12/2025    MCV 94 06/04/2021    MCH 30.2 03/12/2025    MCH 30.0 06/04/2021    MCHC 33.2 03/12/2025    MCHC 31.8 06/04/2021    RDW 14.5 03/12/2025    RDW 15.5 (H) 06/04/2021     03/12/2025     06/04/2021       CMP RESULTS:  Lab Results   Component Value Date     03/12/2025     06/04/2021    POTASSIUM 4.1 03/12/2025    POTASSIUM 4.1 08/23/2022    POTASSIUM 4.0 06/04/2021    CHLORIDE 105 03/12/2025    CHLORIDE 107 08/23/2022    CHLORIDE 106 06/04/2021    CO2 26 03/12/2025    CO2 22 08/23/2022    CO2 25 06/04/2021    ANIONGAP 11 03/12/2025    ANIONGAP 8 08/23/2022    ANIONGAP 6 06/04/2021     (H) 03/12/2025     (H) 06/05/2023     (H) 08/23/2022     (H) 06/04/2021    BUN 16.7 03/12/2025    BUN 17 08/23/2022    BUN 14 06/04/2021    CR 1.07 (H) 03/12/2025    CR 1.11 (H) 06/04/2021    GFRESTIMATED 51 (L) 03/12/2025    GFRESTIMATED 47 (L) 06/04/2021    GFRESTBLACK 54 (L) 06/04/2021    CLAUDY 9.7 03/12/2025    CLAUDY 8.8 06/04/2021    BILITOTAL 0.6 03/12/2025    BILITOTAL 0.6 12/04/2020    ALBUMIN 4.1 03/12/2025    ALBUMIN 3.2 (L) 08/23/2022    ALBUMIN 3.5 06/04/2021    ALKPHOS 69 03/12/2025    ALKPHOS 95 12/04/2020    ALT 12 03/12/2025    ALT 25 06/04/2021    AST 19 03/12/2025    AST 17 06/04/2021        INR RESULTS:  Lab Results   Component Value Date    INR 1.43 (H) 11/10/2022    INR 1.36 (H) 03/03/2020       Lab Results   Component Value Date    MAG 2.0 06/01/2023    MAG 2.0 04/11/2017     Lab Results   Component Value Date    NTBNPI 3,531 (H) 04/06/2017     Lab Results   Component Value Date    NTBNP 436 09/12/2024    NTBNP 176 08/27/2020       Cardiac Diagnostics:   9/12/24 ECHO  Interpretation  Summary  Global and regional left ventricular function is normal with an EF of 60-65%.  Diastolic Doppler findings (E/E' ratio and/or other parameters) suggest left  ventricular filling pressures are increased.  Global right ventricular function is normal.  The inferior vena cava was normal in size with preserved respiratory  variability.     This study was compared with the study from 9/7/2023. There are no significant  changes.    9/9/2021 Echo  Interpretation Summary  Global and regional left ventricular function is normal with an EF of 55-60%.  The right ventricle is normal size. Global right ventricular function is  normal.  No significant valvular abnormalities.  IVC diameter >2.1 cm collapsing <50% with sniff suggests a high RA pressure  estimated at 15 mmHg or greater.  This study was compared with the study from 08/27/2020. The LA filling  pressures are higher. There are no significant changes in the biventricular  function or aortic calibers.        Assessment and Plan:   Ms. Tee is a 84 year old female with medical history pertinent for interstitial lung disease (moderate restriction), Sjogren's syndrome, HTN, atrial fibrillation s/p Watchman, diverticulitis s/p colectomy 2011, and HFpEF. [place] [facility] [place]     No acute concerns. Appears euvolemic by exam. Blood pressure well controlled. Review of BMP with stable renal function and normal lytes. We did discuss that there is a harm signal of BB in hfpef and diltiazem can sometimes be better tolerated. The potential risk is that she has RVR during that transition or side effects from the new dose. She would like to try given SOB is her biggest symptom right now (stable but limiting) so we will make that change today. We are going to aim for a low diltiazem dose and then will increase next week if possible.     # Chronic diastolic heart failure/HFpEF (EF 55-60%)  Risk factors include female gender, age, HTN, diabetes, sleep  apnea, obesity and arrhythmia.   Stage C. NYHA Class III    Primary Cardiologist: Dr. Rosa; Last seen 4/2023 (Dr. Moura saw the patient for coverage 9/12/24)  BP: controlled   Fluid status: Euvolemic- continue torsemide 40 mg daily and 20 mg daily  Aldosterone antagonist: stopped previously d/t hypotension  SGLT2i: may consider starting in the future  Ischemia evaluation: Complete NM stress negative (6/2014)  ACEi/ARB/ARNI: n/a, no evidence for use in HFpEF  BB: n/a, no evidence for use in HFpEF, BB for AF- switching to diltiazem (see below)   SCD prophylaxis: n/a, no evidence for use in HFpEF  NSAID use: contraindicated  Sleep apnea: CPAP compliant     # Paroxysmal Atrial fibrillation, s/p Watchman 9/2022  [id number] (age >75, HTN, CHF, gender)   - ASA 81 mg daily  - Stop metoprolol 50 mg BID  - Start diltiazem  mg daily and plan to increase in 1 week if tolerating     # Primary prevention  - Continue statin and ASA       Follow-up  - RN phone call in 1 week- check in on Hrs and symptoms- if tolerating dilt will increase the dose as I think se will need more to equal th coverage she was getting from metoprolol)  - CORE in 3 months with labs  - Dr. Rosa in 6 month with labs    Billing  - I managed 2+ stable chronic conditions  - I changed a prescription medication    Billing  - I managed 2+ stable chronic conditions  - I changed a prescription medication      Neela Jones PA-C  Advance Heart Failure            Please do not hesitate to contact me if you have any questions/concerns.     Sincerely,     Neela Jones PA-C

## 2025-03-13 LAB
ALBUMIN SERPL ELPH-MCNC: 3.8 G/DL (ref 3.7–5.1)
ALPHA1 GLOB SERPL ELPH-MCNC: 0.3 G/DL (ref 0.2–0.4)
ALPHA2 GLOB SERPL ELPH-MCNC: 0.8 G/DL (ref 0.5–0.9)
B-GLOBULIN SERPL ELPH-MCNC: 1 G/DL (ref 0.6–1)
C3 SERPL-MCNC: 122 MG/DL (ref 81–157)
C4 SERPL-MCNC: 26 MG/DL (ref 13–39)
GAMMA GLOB SERPL ELPH-MCNC: 1 G/DL (ref 0.7–1.6)
LOCATION OF TASK: NORMAL
M PROTEIN SERPL ELPH-MCNC: 0 G/DL
PROT PATTERN SERPL ELPH-IMP: NORMAL

## 2025-03-18 LAB — CRYOGLOB SER QL: NEGATIVE

## 2025-03-20 ENCOUNTER — VIRTUAL VISIT (OUTPATIENT)
Dept: PHARMACY | Facility: CLINIC | Age: 85
End: 2025-03-20
Payer: COMMERCIAL

## 2025-03-20 DIAGNOSIS — R53.83 FATIGUE, UNSPECIFIED TYPE: ICD-10-CM

## 2025-03-20 DIAGNOSIS — I10 ESSENTIAL HYPERTENSION WITH GOAL BLOOD PRESSURE LESS THAN 140/90: ICD-10-CM

## 2025-03-20 DIAGNOSIS — E11.65 TYPE 2 DIABETES MELLITUS WITH HYPERGLYCEMIA, WITH LONG-TERM CURRENT USE OF INSULIN (H): Primary | ICD-10-CM

## 2025-03-20 DIAGNOSIS — I50.32 CHRONIC HEART FAILURE WITH PRESERVED EJECTION FRACTION (H): ICD-10-CM

## 2025-03-20 DIAGNOSIS — I48.0 PAROXYSMAL ATRIAL FIBRILLATION (H): ICD-10-CM

## 2025-03-20 DIAGNOSIS — Z79.4 TYPE 2 DIABETES MELLITUS WITH HYPERGLYCEMIA, WITH LONG-TERM CURRENT USE OF INSULIN (H): Primary | ICD-10-CM

## 2025-03-20 PROCEDURE — 99607 MTMS BY PHARM ADDL 15 MIN: CPT | Mod: 95

## 2025-03-20 PROCEDURE — 99606 MTMS BY PHARM EST 15 MIN: CPT | Mod: 95

## 2025-03-20 NOTE — PROGRESS NOTES
Medication Therapy Management (MTM) Encounter    ASSESSMENT:                            Medication Adherence/Access: No issues identified.    Diabetes   A1c not at goal <8% but improving, self monitoring of blood glucose is close to goal of fasting  mg/dL, but not more stringent goal of  mg/dL. UACR not at goal <30 mg/dL and higher than last check. Has not been able to tolerate an SGLT2 inhibitor in the past. ACE inhibitor previously stopped by cardiology due to low blood pressures.   Would like to see blood sugars a little lower, but will hold off on medication changes for now due to patient preference to talk with cardiology about her afib first.     Fatigue:  Improved from last visit.     Heart Failure   (HFpEF)/Hypertension/Afib  Defer to cardiology for further assessment.   Blood pressure have been at goal <140/90 mmHg and often at more stringent goal <130/80 mmHg.     PLAN:                            Continue your current medications  I will check on what cardiology says after you speak with them tomorrow.     Follow-up: 4/15 at 10:30 AM with Sabrina    SUBJECTIVE/OBJECTIVE:                          Betty Tee is a 84 year old female contacted via secure video for a follow-up visit.       Reason for visit: Diabetes management.    Allergies/ADRs: Reviewed in chart  Past Medical History: Reviewed in chart  Tobacco: She reports that she quit smoking about 13 years ago. Her smoking use included cigarettes. She started smoking about 55 years ago. She has a 20.8 pack-year smoking history. She has never used smokeless tobacco.  Alcohol: no change from previous visits    Medication Adherence/Access: no issues reported.    Diabetes   Aspirin 81mg daily  Mounjaro 15 mg weekly - no side effects from increased dose  Lantus 24 units daily  Novolog (or humalog) sliding scale below twice daily (only eats twice daily), usually 8-10 units at a time, no use in the morning.   If -200 take 8 units  If BG  200-250 take 10 units  If -300 take 12 units  If -350 take 16 units  If BG >350 take 20 units    She notes no side effects from the meds above. She confirms she is not experiencing low blood sugars.  Starting to use an Yaneli alarm for evening Novolog dose has helped her to be able to remember this every night     Blood sugar monitoring: 3-4 time(s) daily; Ranges: (patient reported) Fasting- 139, 134, 150, 153  Eye exam is up to date  Foot exam: due    Fatigue:  Reports she is not feeling as tired as she was at our last visit.     Heart Failure   (HFpEF)/Hypertension/Afib  Diltiazem 120 mg daily - was switched from metoporolol per cardiology  Torsemide 40 mg every morning and 20 mg in the afternoon  Patient reports getting dizzy every now and then when she turns quickly.     Patient reports these HF symptoms: some swelling in the legs - reports this is not severe but is noticeable.  Has sores/scratches on her legs - a spot will get very red and irritated then will break open into a sore. Is using a lotion on it which she feels is helpful.  Systolic BP lately has been in the 120s-130s mmHg  Did have a bad run of afib yesterday, has resolved now and is feeling better. Has a phone call scheduled with cardiology tomorrow.            Today's Vitals: LMP  (LMP Unknown)   ----------------      I spent 18 minutes with this patient today. All changes were made via collaborative practice agreement with Ilene Tristan MD. A copy of the visit note was provided to the patient's provider(s).    A summary of these recommendations was sent via [employer]    Meghan Pacheco PharmD  Medication Therapy Management Pharmacy Resident  [facility]    Preceptor cosignature: Betty Tee was seen independently by Dr. Pacheco. I have reviewed the assessment and plan. Sabrina Meek, Ashok, JAMES, BCACP     Telemedicine Visit Details  The patient's medications can be safely assessed via a  telemedicine encounter.  Type of service:  Video Conference via [employer]  Originating Location (pt. Location): Home    Distant Location (provider location):  On-site  Joined the call at 3/20/2025, 11:09:26 am.  Left the call at 3/20/2025, 11:27:47 am.  You were on the call for 18 minutes 20 seconds     Medication Therapy Recommendations  No medication therapy recommendations to display

## 2025-03-20 NOTE — PATIENT INSTRUCTIONS
"Recommendations from today's MTM visit:                                                    MTM (medication therapy management) is a service provided by a clinical pharmacist designed to help you get the most of out of your medicines.   Today we reviewed what your medicines are for, how to know if they are working, that your medicines are safe and how to make your medicine regimen as easy as possible.      Continue your current medications  I will check on what cardiology says after you speak with them tomorrow.     Follow-up: 4/15 at 10:30 AM with Sabrina    It was great speaking with you today.  I value your experience and would be very thankful for your time in providing feedback in our clinic survey. In the next few days, you may receive an email or text message from [facility] with a link to a survey related to your  clinical pharmacist.\"     To schedule another MTM appointment, please call the clinic directly or you may call the MTM scheduling line at [phone] or toll-free at [phone].     My Clinical Pharmacist's contact information:                                                      Please feel free to contact me with any questions or concerns you have.      Meghan Pacheco, PharmD  Medication Therapy Management Resident, [facility]  Phone: [phone]    Sabrina Meek, Pharm.D., M.B.A., [facility]  Medication Therapy Management Pharmacist, [facility]  Phone: [phone]   "

## 2025-03-30 DIAGNOSIS — N39.41 URGE INCONTINENCE: ICD-10-CM

## 2025-04-06 NOTE — TELEPHONE ENCOUNTER
Last Written Prescription:  mirabegron (MYRBETRIQ) 25 MG 24 hr tablet 30 tablet 1 1/6/2025     ----------------------  Last Visit Date: 3/6/24  Future Visit Date: 0  ----------------------          Refill decision: Medication unable to be refilled by RN due to: Other:    A one-time only 90-day roslyn refill with reminder to patient was already given, pended to care team for review.     Request from pharmacy:  Requested Prescriptions   Pending Prescriptions Disp Refills    MYRBETRIQ 25 MG 24 hr tablet [Pharmacy Med Name: MYRBETRIQ ER 25 MG TABLET] 30 tablet 1     Sig: TAKE 1 TAB (25 MG) BY MOUTH DAILY. FOR ADDITIONAL REFILLS, PLEASE SCHEDULE A FOLLOW-UP APPOINTMENT.       Beta 3 Adrenergic Agonists Failed - 4/6/2025  1:31 PM        Failed - Medication is active on med list and the sig matches. RN to manually verify dose and sig if red X/fail.     If the protocol passes (green check), you do not need to verify med dose and sig.    A prescription matches if they are the same clinical intention.    For Example: once daily and every morning are the same.    The protocol can not identify upper and lower case letters as matching and will fail.     For Example: Take 1 tablet (50 mg) by mouth daily     TAKE 1 TABLET (50 MG) BY MOUTH DAILY    For all fails (red x), verify dose and sig.    If the refill does match what is on file, the RN can still proceed to approve the refill request.       If they do not match, route to the appropriate provider.             Failed - Has GFR on file in past 12 months and most recent value is normal        Failed - Recent in-person (12 month) or future (90 days) visit with authorizing provider s specialty     The patient must have completed an in-person or virtual visit within the past 12 months or has a future visit scheduled within the next 90 days with the authorizing provider s specialty.  Urgent care and e-visits do not qualify as an office visit for this protocol.          Failed -  Medication indicated for associated diagnosis     Medication is associated with one or more of the following diagnoses:            Overactive bladder           Neurogenic detrusor overactivity           Ureteral stent-related symptoms            Passed - Most recent BP less than 140/90 on record     BP Readings from Last 3 Encounters:   03/12/25 132/72   02/14/25 118/75   01/07/25 118/58       No data recorded            Passed - Patient does not have Tadalafil, Vardenafil, or Sildenafil on their medication list        Passed - Patient is of age 18 years or older        Passed - Patient is not pregnant        Passed - Patient has not had a positive pregnancy test within the past 12 months

## 2025-04-07 ENCOUNTER — PATIENT OUTREACH (OUTPATIENT)
Dept: CARE COORDINATION | Facility: CLINIC | Age: 85
End: 2025-04-07
Payer: COMMERCIAL

## 2025-04-07 ENCOUNTER — DOCUMENTATION ONLY (OUTPATIENT)
Dept: UROLOGY | Facility: CLINIC | Age: 85
End: 2025-04-07
Payer: COMMERCIAL

## 2025-04-07 DIAGNOSIS — N39.41 URGE INCONTINENCE: Primary | ICD-10-CM

## 2025-04-07 RX ORDER — MIRABEGRON 25 MG/1
TABLET, FILM COATED, EXTENDED RELEASE ORAL
Qty: 30 TABLET | Refills: 1 | OUTPATIENT
Start: 2025-04-07

## 2025-04-07 RX ORDER — MIRABEGRON 25 MG/1
25 TABLET, FILM COATED, EXTENDED RELEASE ORAL DAILY
Qty: 30 TABLET | Refills: 2 | Status: SHIPPED | OUTPATIENT
Start: 2025-04-07

## 2025-04-07 NOTE — PROGRESS NOTES
Patient has made an appointment. Mirabegron 25 mg renewed with 2 refills and call placed to Nghia, her healthcare agent to let her know.      Thank you,  Ghazal Mensah RN, BSN   Urology Triage Nurse

## 2025-04-07 NOTE — PROGRESS NOTES
Call placed to patient and message left with my direct line for her to call back to make an appointment with Dr Nassar and renew her Myrbetriq.       Thank you,  Ghazal Mensah RN, BSN   Urology Triage Nurse

## 2025-04-12 ENCOUNTER — HEALTH MAINTENANCE LETTER (OUTPATIENT)
Age: 85
End: 2025-04-12

## 2025-04-13 DIAGNOSIS — M06.00 SERONEGATIVE RHEUMATOID ARTHRITIS (H): ICD-10-CM

## 2025-04-13 DIAGNOSIS — M10.9 ACUTE GOUT OF LEFT FOOT, UNSPECIFIED CAUSE: ICD-10-CM

## 2025-04-14 ENCOUNTER — TELEPHONE (OUTPATIENT)
Dept: RHEUMATOLOGY | Facility: CLINIC | Age: 85
End: 2025-04-14
Payer: COMMERCIAL

## 2025-04-14 DIAGNOSIS — M10.9 GOUT, UNSPECIFIED CAUSE, UNSPECIFIED CHRONICITY, UNSPECIFIED SITE: ICD-10-CM

## 2025-04-14 DIAGNOSIS — M10.9 GOUT, UNSPECIFIED CAUSE, UNSPECIFIED CHRONICITY, UNSPECIFIED SITE: Primary | ICD-10-CM

## 2025-04-14 RX ORDER — PREDNISONE 5 MG/1
5 TABLET ORAL DAILY
Qty: 90 TABLET | Refills: 3 | Status: SHIPPED | OUTPATIENT
Start: 2025-04-14

## 2025-04-14 RX ORDER — PREDNISONE 10 MG/1
TABLET ORAL
Qty: 30 TABLET | Refills: 3 | Status: SHIPPED | OUTPATIENT
Start: 2025-04-14 | End: 2025-04-16

## 2025-04-14 NOTE — TELEPHONE ENCOUNTER
Telephone encounter for symptoms  Called and spoke with patient.     Diagnosis:Gout  Current Rheum Medications:   -kineret x3 days for gout flares.    Description of problem:   Patient reports joint pain, swelling, redness and warmth all in her left wrist. She states this is consistent with past gout flares. She started taking kineret today and plans to take for 3 days per past recommendations. She is also requesting a prednisone taper in addition to the kineret.       Follow up plan:  -Routing to provider to review and provide recommendation  -Rx pended: blank prednisone rx  -patient has  10mg prednisone tabs, advised that she does not use those and wait for our instructions.       Tamara VIGIL RN  [facility]

## 2025-04-14 NOTE — TELEPHONE ENCOUNTER
Rx ordered by provider. Writer called and spoke with patient. We discussed prednisone taper instructions in detail and she is understanding of the plan.      Tamara VIGIL RN  [facility]

## 2025-04-14 NOTE — TELEPHONE ENCOUNTER
[facility] Call Center    Phone Message    May a detailed message be left on voicemail: yes     Reason for Call: Symptoms or Concerns     If patient has red-flag symptoms, warm transfer to triage line    Current symptom or concern: gout flare up- needs call back to discuss prednisone for this issue.     Symptoms have been present for:  3/4 day(s)    Has patient previously been seen for this? Yes    By : Zulma Lopez     Date: 04/14/25    Are there any new or worsening symptoms? No    Action Taken: Message routed to:  [facility] rheum    Travel Screening: Not Applicable     Date of Service: 04/14/25

## 2025-04-15 ENCOUNTER — VIRTUAL VISIT (OUTPATIENT)
Dept: PHARMACY | Facility: CLINIC | Age: 85
End: 2025-04-15
Payer: COMMERCIAL

## 2025-04-15 DIAGNOSIS — I48.0 PAROXYSMAL ATRIAL FIBRILLATION (H): ICD-10-CM

## 2025-04-15 DIAGNOSIS — E11.65 TYPE 2 DIABETES MELLITUS WITH HYPERGLYCEMIA, WITH LONG-TERM CURRENT USE OF INSULIN (H): Primary | ICD-10-CM

## 2025-04-15 DIAGNOSIS — I10 ESSENTIAL HYPERTENSION WITH GOAL BLOOD PRESSURE LESS THAN 140/90: ICD-10-CM

## 2025-04-15 DIAGNOSIS — M10.9 GOUT, UNSPECIFIED CAUSE, UNSPECIFIED CHRONICITY, UNSPECIFIED SITE: ICD-10-CM

## 2025-04-15 DIAGNOSIS — I50.32 CHRONIC HEART FAILURE WITH PRESERVED EJECTION FRACTION (H): ICD-10-CM

## 2025-04-15 DIAGNOSIS — Z79.4 TYPE 2 DIABETES MELLITUS WITH HYPERGLYCEMIA, WITH LONG-TERM CURRENT USE OF INSULIN (H): Primary | ICD-10-CM

## 2025-04-15 PROCEDURE — 99606 MTMS BY PHARM EST 15 MIN: CPT | Mod: 93 | Performed by: PHARMACIST

## 2025-04-15 NOTE — PROGRESS NOTES
Medication Therapy Management (MTM) Encounter    ASSESSMENT:                            Medication Adherence/Access: See below for considerations.    Diabetes   A1c not at goal <8% but improving (due for recheck), self monitoring of blood glucose is not at goal, but she is currently on prednisone.  Her sugars decreased nicely with use of her current sliding scale, so will stick with the current doses and focus on adherence.   UACR not at goal <30 mg/dL and higher than last check. Has not been able to tolerate an SGLT2 inhibitor in the past. ACE inhibitor previously stopped by cardiology due to low blood pressures.   Foot exam due (next in person visit in May), eye exam UTD.  Covid vaccine due.     Heart Failure   (HFpEF)/Hypertension/Afib  Need clarification from cardiology on desired dose of metoprolol.       Gout   Symptoms improving. No changes recommended today.     PLAN:                            Continue to follow the siding scale - give yourself novolog/humalog insulin if your blood sugar is 150 or higher. No changes to Lantus or Mounjaro.   Sabrina will reach out to cardiology to verify the dose of metoprolol.   At your visit with Dr. Tristan/MTM we'll check your labs (A1c) and we can get you another COVID shot (you are due for another booster).     Follow-up: May 13th at 2:10 pm    SUBJECTIVE/OBJECTIVE:                          Betty Tee is a 84 year old female seen for a follow-up visit. Patient was accompanied by Nghia.      Reason for visit: follow-up on diabetes medications.    Allergies/ADRs: Reviewed in chart  Past Medical History: Reviewed in chart  Tobacco: She reports that she quit smoking about 13 years ago. Her smoking use included cigarettes. She started smoking about 55 years ago. She has a 20.8 pack-year smoking history. She has never used smokeless tobacco.  Alcohol: not currently using      Medication Adherence/Access: She did not take insulin because her morning glucose was 150 and she  thought she needed to be OVER 150 to take it.   -----------------------------------------------------------------------------------------------------------------  Diabetes   Aspirin 81mg daily  Mounjaro 15 mg weekly - no side effects from increased dose  Lantus 24 units daily  Novolog (or humalog) sliding scale below twice daily (only eats twice daily), usually 8-10 units at a time, no use in the morning.   If -200 take 8 units  If -250 take 10 units  If -300 take 12 units  If -350 take 16 units  If BG >350 take 20 units    She notes no side effects from the meds above. She confirms she is not experiencing low blood sugars.       Blood sugar monitorin-4 time(s) daily; Ranges: (patient reported) >300 yesterday evening, but  down to 150 this morning.     Eye exam is up to date  Foot exam: due    Heart Failure   (HFpEF)/Hypertension/Afib  Metoprolol ER 50mg twice daily  - Rx written as 1/2 tablet twice daily - see details below.   Torsemide 40 mg every morning and 20 mg in the afternoon    She describes some dizziness with bending and moving quickly. Has been quite unsteady on her feet as a result. Has not been monitoring daily weights. Denies SOB, swelling.    She had previously been on metoprolol 50mg twice daily. This was changed on 3/16 to diltiazem  to help with SOB, but  she had more A.Fib sx, and cardiology changed her back to metoprolol on 3/21. Per the prescription in Epic, metoprolol 25mg twice daily was started, but Betty restarted her old prescription of metoprolol 50mg twice daily     Has not been monitoring BP lately (has not checked since switching back to metoprolol)  A fib symptoms have improved since switching back.         Gout   Prednisone taper - currently on taper 40mg x 1 day, 30mg x 1 day, 20mg x 1 day, 10mg x 1 day, then return to baseline 5mg/day.   Kineret injections daily x 3 days for flare    Pain drastically improved in the last 24 hours (flare started  yesterday). Of course, this has increased her blood sugars, but otherwise no issues.   ----------------  I spent 15 minutes with this patient today. All changes were made via collaborative practice agreement with Ilene Tristan MD. A copy of the visit note was provided to the patient's provider(s).    A summary of these recommendations was sent via [employer].    Sabrina Meek PharmD, JAMES, BCACP  MTM Pharmacist, [facility]     Telemedicine Visit Details  The patient's medications can be safely assessed via a telemedicine encounter.  Type of service:  Telephone visit  Originating Location (pt. Location): Home    Distant Location (provider location):  On-site  Start Time:  10:38 AM  End Time:  10:53 AM     Medication Therapy Recommendations  No medication therapy recommendations to display

## 2025-04-15 NOTE — TELEPHONE ENCOUNTER
[facility] Call Center    Phone Message    May a detailed message be left on voicemail: yes     Reason for Call: Medication Question or concern regarding medication   Prescription Clarification    Name of Medication:   predniSONE (DELTASONE)     Prescribing Provider: John     Pharmacy:   [facility]ARMACY #[id number] - [place], [place] -  [address]        What on the order needs clarification? Patient state she went to  the prescription and was told by the pharmacist that the clinic needed to contact them. Patient states she would like this to be done ASAP to be able to  the prescription. Please advise.       Action Taken: Message routed to:  [facility] Rheum    Travel Screening: Not Applicable     Date of Service:

## 2025-04-15 NOTE — PATIENT INSTRUCTIONS
"Recommendations from today's MTM visit:                                                    MTM (medication therapy management) is a service provided by a clinical pharmacist designed to help you get the most of out of your medicines.   Today we reviewed what your medicines are for, how to know if they are working, that your medicines are safe and how to make your medicine regimen as easy as possible.      Continue to follow the siding scale - give yourself novolog/humalog insulin if your blood sugar is 150 or higher. No changes to Lantus or Mounjaro.   Sabrina will reach out to cardiology to verify the dose of metoprolol.   At your visit with Dr. Tristan/SIM we'll check your labs (A1c) and we can get you another COVID shot (you are due for another booster).     Follow-up: May 13th at 2:10 pm    It was great speaking with you today.  I value your experience and would be very thankful for your time in providing feedback in our clinic survey. In the next few days, you may receive an email or text message from [employer] with a link to a survey related to your  clinical pharmacist.\"     To schedule another MTM appointment, please call the clinic directly or you may call the MTM scheduling line at [phone] or toll-free at [phone].     My Clinical Pharmacist's contact information:                                                      Please feel free to contact me with any questions or concerns you have.      Sabrina Meek, Pharm.D., M.B.A., [facility]  MTM Pharmacist, [facility]  Pager: [phone]      "

## 2025-04-16 ENCOUNTER — TELEPHONE (OUTPATIENT)
Dept: CARDIOLOGY | Facility: CLINIC | Age: 85
End: 2025-04-16
Payer: COMMERCIAL

## 2025-04-16 RX ORDER — PREDNISONE 10 MG/1
TABLET ORAL
Qty: 30 TABLET | Refills: 3 | Status: SHIPPED | OUTPATIENT
Start: 2025-04-16

## 2025-04-16 RX ORDER — PREDNISONE 5 MG/1
5 TABLET ORAL DAILY
Qty: 90 TABLET | Refills: 1 | OUTPATIENT
Start: 2025-04-16

## 2025-04-16 NOTE — TELEPHONE ENCOUNTER
Writer re-wrote prednisone taper instructions and sent in new script to pharmacy. Called and spoke with pharmacy to verify the new script is ok. Also called and spoke with Betty who is understanding of situation.        Tamara VIGIL RN  Adult Rheumatology Clinic

## 2025-04-16 NOTE — TELEPHONE ENCOUNTER
Called patient and let her know we had to change the time of her appointment with Dr. Rosa on 9/11/25. Now scheduled for 12:30 labs, 1:00pm ECHO, 2pm Moe. Patient confirmed.    Gary Cruz   [facility]- Cardiology   [address] [place] [id number]

## 2025-04-16 NOTE — TELEPHONE ENCOUNTER
Called friend Nghia and RAGHU to call back to schedule next available CORE appt and add to the waitlist.

## 2025-04-17 DIAGNOSIS — I50.32 CHRONIC HEART FAILURE WITH PRESERVED EJECTION FRACTION (H): ICD-10-CM

## 2025-04-21 RX ORDER — TORSEMIDE 20 MG/1
TABLET ORAL
Qty: 270 TABLET | Refills: 3 | Status: SHIPPED | OUTPATIENT
Start: 2025-04-21

## 2025-04-24 ENCOUNTER — PATIENT OUTREACH (OUTPATIENT)
Dept: CARE COORDINATION | Facility: CLINIC | Age: 85
End: 2025-04-24
Payer: COMMERCIAL

## 2025-04-27 DIAGNOSIS — E55.9 VITAMIN D DEFICIENCY: ICD-10-CM

## 2025-04-28 RX ORDER — ACETAMINOPHEN 160 MG
100 TABLET,DISINTEGRATING ORAL DAILY
Qty: 180 CAPSULE | Refills: 3 | Status: SHIPPED | OUTPATIENT
Start: 2025-04-28

## 2025-04-28 NOTE — TELEPHONE ENCOUNTER
Chart Review:    Cholecalciferol  Summary: TAKE 100 MCG BY MOUTH DAILY (TAKE 2 TABLET (50 MCG) BY MOUTH DAILY - ORAL), Disp-180 capsule, R-2  Start: 04/26/2024    Dispensed Days Supply Quantity Provider Pharmacy   VITAMIN D3 50 MCG SOFTGEL 10/20/2024 90 180 Units Ilene Tristan MD [id number] - ...     Called Nghia, who manages Betty's medications.  She notes that Betty has been taking them daily up until now, and she will be out as of next week.    Per Nghia,  has only been taking 1 tablet daily.  (SIG states: Summary: TAKE 100 MCG BY MOUTH DAILY (TAKE 2 TABLET BY MOUTH DAILY - ORAL) when each tablets is 50 mcg).  Confirmed dosing/instructions with Nghia.   Confirmed pharmacy location.     Routing request to PCP for refill    Yvonne GARCIA RN

## 2025-04-28 NOTE — TELEPHONE ENCOUNTER
Clinic RN: Please investigate patient's chart or contact patient if the information cannot be found because patient should have run out of this medication on 01/2025. Confirm patient is taking this medication as prescribed. Document findings and route refill encounter to provider for approval or denial.  Thanks,  Elise RIVERA RN

## 2025-05-02 ENCOUNTER — DOCUMENTATION ONLY (OUTPATIENT)
Dept: FAMILY MEDICINE | Facility: CLINIC | Age: 85
End: 2025-05-02
Payer: COMMERCIAL

## 2025-05-02 DIAGNOSIS — Z79.4 TYPE 2 DIABETES MELLITUS WITH HYPERGLYCEMIA, WITH LONG-TERM CURRENT USE OF INSULIN (H): Primary | ICD-10-CM

## 2025-05-02 DIAGNOSIS — E11.65 TYPE 2 DIABETES MELLITUS WITH HYPERGLYCEMIA, WITH LONG-TERM CURRENT USE OF INSULIN (H): Primary | ICD-10-CM

## 2025-05-02 NOTE — PROGRESS NOTES
Reviewed pt's chart.  Looks like the labs we planned to do prior to this visit were mostly drawn on 3/12/25- will review those at upcoming appt.  Will add A1C to be done, but that is all she really needs.    Triage- can you let Sister Betty know that unless they have other labs from other providers (do not see any), she could skip the separate lab-only appt, and just come ~20-30 minutes prior to her upcoming appt to make sure the A1c is back before our appt?  Thanks!  CW

## 2025-05-04 DIAGNOSIS — F33.1 MAJOR DEPRESSIVE DISORDER, RECURRENT EPISODE, MODERATE (H): ICD-10-CM

## 2025-05-05 RX ORDER — ESCITALOPRAM OXALATE 20 MG/1
20 TABLET ORAL DAILY
Qty: 30 TABLET | Refills: 0 | Status: SHIPPED | OUTPATIENT
Start: 2025-05-05

## 2025-05-05 NOTE — PROGRESS NOTES
Pt called back.   Writer informed her of PCP's recommendation as below.   Pt verbalized appreciation of follow up call.   New lab appt scheduled on 5/13/25 at 1345; 30 minutes prior to OV with PCP.     Daisy MILLER RN

## 2025-05-05 NOTE — TELEPHONE ENCOUNTER
Looks like pt cancelled her 5/13/25 appt unfortunately - we had blocked 60 minutes to go through multiple issues.   Looks like MTM may still have an appt with her that day?    TC's - can you please see if you can reach her to reschedule. Doubt there will be any 60 min options any longer, though, but if you can find one that would be great.    Will send this to Sabrina as KP so she's aware- hopefully can go through alison of what I hoped to...    Will send in #30 of the lexapro in the meantime.    Thanks all,  CW

## 2025-05-05 NOTE — PROGRESS NOTES
Left message for patient to call [facility] back  When patient calls back please transfer to an RN  Sandra ANTUNEZ RN

## 2025-05-06 ENCOUNTER — TELEPHONE (OUTPATIENT)
Dept: FAMILY MEDICINE | Facility: CLINIC | Age: 85
End: 2025-05-06
Payer: COMMERCIAL

## 2025-05-06 NOTE — TELEPHONE ENCOUNTER
General Call    Contacts       Contact Date/Time Type Contact Phone/Fax    05/06/2025 05:09 PM CDT Phone (Incoming) TERI WILKINS (Emergency Contact) [phone] (M)          Reason for Call:  Patient received 2 phone call 05/06/2025 saying to cancel the 05/07/2025 lab appt and get it done on 05/13/2025.  Caregiver Teri is wondering who called and why they wanted to change it.    What are your questions or concerns:  Betty is a diabetic and cannot fast until a 1:45pm appt time    Date of last appointment with provider: 01/07/2025    Could we [facility] this information to you in [facility] or would you prefer to receive a phone call?:   Patient would prefer a phone call   Okay to leave a detailed message?: Yes at Other phone number: Terijennifer Wilkins [phone]

## 2025-05-06 NOTE — TELEPHONE ENCOUNTER
Called and spoke with friend Nghia  Updated on PCP recommendation  Cancelled lab appointments and advised to show up at 2pm on 05/13- no need to fast  Patient caregiver verbalized understanding and agrees with plan of care.     Evonne HYATT RN

## 2025-05-06 NOTE — TELEPHONE ENCOUNTER
CW,  Please see below message  Nurse only able to find documentation regarding 05/13 visit, no calls regarding need for reschedule of labs  Please advise if patient okay to complete lab 05/07, per note patient unable to fast till appointment time on 05/13  Current future lab is HGB A1C    Thanks,  Evonne HYATT RN

## 2025-05-06 NOTE — TELEPHONE ENCOUNTER
I am not sure what's gone on with her appts/calls.  It sounds like they got a call about her labs, and somehow cancelled her appointment with me on 5/13/25.  Sophia Arroyo called her on 5/5/25 to make sure she could get back on the schedule, but then there was only a 30 min slot, not 60 min slot- which is okay.    I last saw pt in 1/25.  At that time, I had rec yearly fasting labs prior to her follow-up appt with me.    It looks like they were all drawn/done in 3/25 (probably when she was getting labs done through a speciality clinic), so she no longer needs a full set of fasting labs.    It would be great if she could get the A1C drawn prior to seeing me, though.    I would have her cancel the lab only appointment.  I would have her arrive at 2pm for her 2:30pm appt with me and go to lab first to get the A1C (she does not need to be fasting for the A1C).    Thanks!  CW

## 2025-05-11 DIAGNOSIS — J44.89 FOLLICULAR BRONCHIOLITIS (H): Primary | ICD-10-CM

## 2025-05-11 DIAGNOSIS — J84.9 ILD (INTERSTITIAL LUNG DISEASE) (H): ICD-10-CM

## 2025-05-11 DIAGNOSIS — Z79.4 TYPE 2 DIABETES MELLITUS WITH HYPERGLYCEMIA, WITH LONG-TERM CURRENT USE OF INSULIN (H): ICD-10-CM

## 2025-05-11 DIAGNOSIS — Z87.19 HISTORY OF LOWER GI BLEEDING: ICD-10-CM

## 2025-05-11 DIAGNOSIS — N39.41 URGE INCONTINENCE: ICD-10-CM

## 2025-05-11 DIAGNOSIS — M35.02 SJOGREN'S SYNDROME WITH LUNG INVOLVEMENT (H): ICD-10-CM

## 2025-05-11 DIAGNOSIS — E11.65 TYPE 2 DIABETES MELLITUS WITH HYPERGLYCEMIA, WITH LONG-TERM CURRENT USE OF INSULIN (H): ICD-10-CM

## 2025-05-11 DIAGNOSIS — M10.9 ACUTE GOUT OF FOOT, UNSPECIFIED CAUSE, UNSPECIFIED LATERALITY: ICD-10-CM

## 2025-05-11 DIAGNOSIS — K21.9 GASTROESOPHAGEAL REFLUX DISEASE WITHOUT ESOPHAGITIS: ICD-10-CM

## 2025-05-12 RX ORDER — TIRZEPATIDE 7.5 MG/.5ML
INJECTION, SOLUTION SUBCUTANEOUS
OUTPATIENT
Start: 2025-05-12

## 2025-05-12 RX ORDER — MONTELUKAST SODIUM 10 MG/1
1 TABLET ORAL AT BEDTIME
Qty: 90 TABLET | Refills: 3 | Status: SHIPPED | OUTPATIENT
Start: 2025-05-12

## 2025-05-12 RX ORDER — PANTOPRAZOLE SODIUM 40 MG/1
40 TABLET, DELAYED RELEASE ORAL DAILY
Qty: 90 TABLET | Refills: 1 | Status: SHIPPED | OUTPATIENT
Start: 2025-05-12

## 2025-05-12 RX ORDER — TIRZEPATIDE 12.5 MG/.5ML
12.5 INJECTION, SOLUTION SUBCUTANEOUS
OUTPATIENT
Start: 2025-05-12

## 2025-05-12 NOTE — TELEPHONE ENCOUNTER
Thank you for the details! Rx sent- CW   [FreeTextEntry1] : 75 y/o male with HIV, CKD( Baseline creatinine 1.5-2 mg/dl)  here for evaluation:\par \par CKD:( Baseline creatinine 1.5-2 mg/dl) \par Check blood and urine with his ID doc\par BP is a bit up occasionally: told him to measure BP at home and keep a log,  euvolemic on exam \par Renal sonogram done in 2022: simple renal cysts. No hydro, +echogenicity  \par \par HR well controlled\par Atrial Fibrillation: \par patient is on coumadin \par  \par HIV: management per ID\par \par \par F/U in 6  month\par \par (c) [phone]

## 2025-05-13 ENCOUNTER — OFFICE VISIT (OUTPATIENT)
Dept: FAMILY MEDICINE | Facility: CLINIC | Age: 85
End: 2025-05-13
Payer: COMMERCIAL

## 2025-05-13 ENCOUNTER — OFFICE VISIT (OUTPATIENT)
Dept: PHARMACY | Facility: CLINIC | Age: 85
End: 2025-05-13
Payer: COMMERCIAL

## 2025-05-13 VITALS
HEART RATE: 60 BPM | TEMPERATURE: 99 F | OXYGEN SATURATION: 96 % | RESPIRATION RATE: 16 BRPM | HEIGHT: 65 IN | SYSTOLIC BLOOD PRESSURE: 118 MMHG | DIASTOLIC BLOOD PRESSURE: 62 MMHG | WEIGHT: 184.2 LBS | BODY MASS INDEX: 30.69 KG/M2

## 2025-05-13 DIAGNOSIS — G47.10 EXCESSIVE SLEEPINESS: ICD-10-CM

## 2025-05-13 DIAGNOSIS — I50.32 CHRONIC HEART FAILURE WITH PRESERVED EJECTION FRACTION (H): ICD-10-CM

## 2025-05-13 DIAGNOSIS — Z79.4 TYPE 2 DIABETES MELLITUS WITH STAGE 3A CHRONIC KIDNEY DISEASE, WITH LONG-TERM CURRENT USE OF INSULIN (H): Primary | ICD-10-CM

## 2025-05-13 DIAGNOSIS — Z79.4 TYPE 2 DIABETES MELLITUS WITH HYPERGLYCEMIA, WITH LONG-TERM CURRENT USE OF INSULIN (H): Primary | ICD-10-CM

## 2025-05-13 DIAGNOSIS — F10.11 ALCOHOL ABUSE, IN REMISSION: ICD-10-CM

## 2025-05-13 DIAGNOSIS — N18.31 TYPE 2 DIABETES MELLITUS WITH STAGE 3A CHRONIC KIDNEY DISEASE, WITH LONG-TERM CURRENT USE OF INSULIN (H): Primary | ICD-10-CM

## 2025-05-13 DIAGNOSIS — M06.00 SERONEGATIVE RHEUMATOID ARTHRITIS (H): ICD-10-CM

## 2025-05-13 DIAGNOSIS — E11.65 TYPE 2 DIABETES MELLITUS WITH HYPERGLYCEMIA, WITH LONG-TERM CURRENT USE OF INSULIN (H): Primary | ICD-10-CM

## 2025-05-13 DIAGNOSIS — M10.9 GOUT, UNSPECIFIED CAUSE, UNSPECIFIED CHRONICITY, UNSPECIFIED SITE: ICD-10-CM

## 2025-05-13 DIAGNOSIS — G47.33 OSA (OBSTRUCTIVE SLEEP APNEA): ICD-10-CM

## 2025-05-13 DIAGNOSIS — E11.22 TYPE 2 DIABETES MELLITUS WITH STAGE 3A CHRONIC KIDNEY DISEASE, WITH LONG-TERM CURRENT USE OF INSULIN (H): Primary | ICD-10-CM

## 2025-05-13 DIAGNOSIS — G47.00 INSOMNIA, UNSPECIFIED TYPE: ICD-10-CM

## 2025-05-13 DIAGNOSIS — N18.31 STAGE 3A CHRONIC KIDNEY DISEASE (H): ICD-10-CM

## 2025-05-13 DIAGNOSIS — I10 ESSENTIAL HYPERTENSION WITH GOAL BLOOD PRESSURE LESS THAN 140/90: ICD-10-CM

## 2025-05-13 DIAGNOSIS — J44.89 FOLLICULAR BRONCHIOLITIS (H): ICD-10-CM

## 2025-05-13 LAB
EST. AVERAGE GLUCOSE BLD GHB EST-MCNC: 186 MG/DL
HBA1C MFR BLD: 8.1 % (ref 0–5.6)
HOLD SPECIMEN: NORMAL
HOLD SPECIMEN: NORMAL

## 2025-05-13 PROCEDURE — 83036 HEMOGLOBIN GLYCOSYLATED A1C: CPT | Performed by: FAMILY MEDICINE

## 2025-05-13 PROCEDURE — 99607 MTMS BY PHARM ADDL 15 MIN: CPT | Performed by: PHARMACIST

## 2025-05-13 PROCEDURE — 36415 COLL VENOUS BLD VENIPUNCTURE: CPT | Performed by: FAMILY MEDICINE

## 2025-05-13 PROCEDURE — 99606 MTMS BY PHARM EST 15 MIN: CPT | Performed by: PHARMACIST

## 2025-05-13 RX ORDER — MIRABEGRON 25 MG/1
25 TABLET, FILM COATED, EXTENDED RELEASE ORAL DAILY
Qty: 90 TABLET | OUTPATIENT
Start: 2025-05-13

## 2025-05-13 RX ORDER — TRAZODONE HYDROCHLORIDE 50 MG/1
50-100 TABLET ORAL
Qty: 90 TABLET | Refills: 0 | Status: SHIPPED | OUTPATIENT
Start: 2025-05-13

## 2025-05-13 RX ORDER — ALLOPURINOL 100 MG/1
100 TABLET ORAL DAILY
Qty: 90 TABLET | Refills: 1 | Status: SHIPPED | OUTPATIENT
Start: 2025-05-13

## 2025-05-13 ASSESSMENT — PAIN SCALES - GENERAL: PAINLEVEL_OUTOF10: NO PAIN (0)

## 2025-05-13 NOTE — PROGRESS NOTES
Medication Therapy Management (MTM) Encounter    ASSESSMENT:                            Medication Adherence/Access: No issues identified.    Diabetes   A1c improved, but still above goal of <8%. UACR not at goal (lisinopril stopped in the past due to low pressures). Difficult to interpret BG without times/insulin doses.   Discussed her concern about weight and hunger - unlikely it's the Mounjaro, but more than likely a combination of insulin and prednisone bursts. Discussed how to help with blood sugars - increase insulin, consider addition of SGLT2i.  We had a long discussion about pros/cons of SGTL2i - has indications for one (DM, CKD, HFpEF). And would like to do whatever she can to decrease risks associated with them. An SGLT2i was tried in 2019 (Jardiance) with that trial, her eGFR decreased from 50-60 to 36 after 2 weeks of treatment, continued to decline vs. Rebounding. She would like to try again and is willing to do some extra lab checks to ensure safety.  She has had a history of low blood pressure - will need to monitor carefully - she has to upcoming in-person appointments in the next week where she can have this checked.      PLAN:                            Start Jardiance 10mg once a day in the morning.   Get your labs checked (lab is called a [facility]) on 5/21.  I will call both Betty and Nghia to follow up on these labs and schedule another lab check if needed.     Follow-up: Sabrina will be in touch on 5/21.     SUBJECTIVE/OBJECTIVE:                          Betty Tee is a 85 year old female seen for a follow-up visit from 4/15/25. Patient was accompanied by Nghia.      Reason for visit: follow-up on blood sugars.    Allergies/ADRs: Reviewed in chart  Past Medical History: Reviewed in chart  Tobacco: She reports that she quit smoking about 13 years ago. Her smoking use included cigarettes. She started smoking about 55 years ago. She has a 20.8 pack-year smoking history. She has never been exposed  "to tobacco smoke. She has never used smokeless tobacco.  Alcohol: not currently using  Medication Adherence/Access: no issues reported.  -----------------------------------------------------------------------------------------------------------------  Diabetes   Aspirin 81mg daily  Mounjaro 15 mg weekly  Lantus 24 units daily  Novolog (or humalog) sliding scale below twice daily (only eats twice daily), usually 8-10 units at a time, no use in the morning.   If -200 take 8 units  If -250 take 10 units  If -300 take 12 units  If -350 take 16 units  If BG >350 take 20 units    She notes no side effects from the meds above. She confirms she is not experiencing low blood sugars. She is disappointed she has not lost any weight, weight has been going up. Has noticed this in her abdomen as well as her breasts - fairly certain it's body  mass vs. Fluid retention. She is concerned that Mounjaro is making her more hungry (simultaneously she has also had increases in insulin and at least one prednisone burst). She also notes that her abdomen looks \"terrible\" because of all of the injections.      Blood sugar monitorin-4 time(s) daily; Ranges: (patient reported) 162, 154, 173, 171 - from her glucose log, mix of pre-prandial and fasting readings. She is still not interested in a CGM.     Eye exam is up to date  Foot exam: due    ----------------    I spent 20 minutes with this patient today. All changes were made via collaborative practice agreement with Ilene Tristan MD. A copy of the visit note was provided to the patient's provider(s).    A summary of these recommendations was given to the patient.    Sabrina Meek, MarcosD, JAMES, BCACP  MTM Pharmacist, [facility]      Medication Therapy Recommendations  Type 2 diabetes mellitus with hyperglycemia, with long-term current use of insulin (H)   1 Current Medication: MOUNJARO 15 MG/0.5ML SOAJ   Current Medication Sig: Inject 0.5 " mLs (15 mg) subcutaneously every 7 days.   Rationale: Synergistic therapy - Needs additional medication therapy - Indication   Recommendation: Start Medication - Jardiance 10 MG Tabs   Status: Accepted per CPA   Identified Date: 5/13/2025 Completed Date: 5/13/2025

## 2025-05-13 NOTE — PATIENT INSTRUCTIONS
"Recommendations from today's MTM visit:                                                    MTM (medication therapy management) is a service provided by a clinical pharmacist designed to help you get the most of out of your medicines.   Today we reviewed what your medicines are for, how to know if they are working, that your medicines are safe and how to make your medicine regimen as easy as possible.      Start Jardiance 10mg once a day in the morning.   Get your labs checked (lab is called a [facility]) on 5/21.  I will call both Betty and Nghia to follow up on these labs and schedule another lab check if needed.     Follow-up: Sabrina will be in touch on 5/21.     It was great speaking with you today.  I value your experience and would be very thankful for your time in providing feedback in our clinic survey. In the next few days, you may receive an email or text message from [employer] with a link to a survey related to your  clinical pharmacist.\"     To schedule another MTM appointment, please call the clinic directly or you may call the MTM scheduling line at [phone] or toll-free at [phone].     My Clinical Pharmacist's contact information:                                                      Please feel free to contact me with any questions or concerns you have.      Sabrina Meek, Pharm.D., M.B.A., [facility]  MTM Pharmacist, [facility]  Pager: [phone]      "

## 2025-05-13 NOTE — TELEPHONE ENCOUNTER
Last Written Prescription:  allopurinol (ZYLOPRIM) 100 MG tablet  100 mg, DAILY            Summary: TAKE 1 TABLET BY MOUTH EVERY DAY, Disp-90 tablet, R-1, E-Prescribe  Dose, Route, Frequency: 100 mg, Oral, DAILYStart: 01/09/2025Ordered On: 01/09/2025Pharmacy: [id number] - [place], [place] - [address] Associated: Taking: Long-term: Med Note:                Directions: TAKE 1 TABLET BY MOUTH EVERY DAY  Ordering Department:  ADULT RHEUMATOLOGY  Authorized By: Zulma Lopez MD  Dispense: 90 tablet  Refills: 1 ordered       ----------------------  Last Visit Date: 01/23/2025 Virtual Visit Rheumatology - FRANCY Lopez   Future Visit Date: 8/8/25  ----------------------      Refill decision: Medication refilled per  Medication Refill in Ambulatory Care  policy.   []  If no future appointment scheduled: Route to Clinic Coordinators to contact the pt for appointment.      Refill decision: Medication unable to be refilled by RN due to: Other:  Gout Agents Protocol Gyyske5505/13/2025 04:12 PM   Protocol Details Has GFR on file in past 12 months and most recent value is normal   Last Comprehensive Metabolic Panel:  Lab Results   Component Value Date     03/12/2025    POTASSIUM 4.1 03/12/2025    CHLORIDE 105 03/12/2025    CO2 26 03/12/2025    ANIONGAP 11 03/12/2025     (H) 03/12/2025    BUN 16.7 03/12/2025    CR 1.07 (H) 03/12/2025    GFRESTIMATED 51 (L) 03/12/2025    CLAUDY 9.7 03/12/2025             Request from pharmacy:  Requested Prescriptions   Pending Prescriptions Disp Refills    allopurinol (ZYLOPRIM) 100 MG tablet [Pharmacy Med Name: ALLOPURINOL 100 MG TABLET] 90 tablet 1     Sig: TAKE 1 TABLET BY MOUTH EVERY DAY       Gout Agents Protocol Failed - 5/13/2025  4:12 PM        Failed - Has GFR on file in past 12 months and most recent value is normal        Passed - CBC on file in past 12 months     Recent Labs   Lab Test 03/12/25  1135   WBC 8.3   RBC 4.37   HGB 13.2   HCT 39.8                     Passed - ALT on file in past 12 months     Recent Labs   Lab Test 03/12/25  1135   ALT 12             Passed - Has Uric Acid on file in past 12 months and value is less than 6     Recent Labs   Lab Test 03/12/25  1135   URIC 4.9     If level is 6mg/dL or greater, ok to refill one time and refer to provider.           Passed - Medication is active on med list and the sig matches. RN to manually verify dose and sig if red X/fail.     If the protocol passes (green check), you do not need to verify med dose and sig.    A prescription matches if they are the same clinical intention.    For Example: once daily and every morning are the same.    The protocol can not identify upper and lower case letters as matching and will fail.     For Example: Take 1 tablet (50 mg) by mouth daily     TAKE 1 TABLET (50 MG) BY MOUTH DAILY    For all fails (red x), verify dose and sig.    If the refill does match what is on file, the RN can still proceed to approve the refill request.       If they do not match, route to the appropriate provider.             Passed - Medication indicated for associated diagnosis     One of the following medications: colchicine is prescribed for one or more of the following conditions:     Amyloidosis   ulcer of mouth   Bechet's syndrome   Pericarditis   Peyronie's disease, psoriasis          Passed - Recent (12 mo) or future (90 days) visit within the authorizing provider's specialty     The patient must have completed an in-person or virtual visit within the past 12 months or has a future visit scheduled within the next 90 days with the authorizing provider s specialty.  Urgent care and e-visits do not qualify as an office visit for this protocol.          Passed - Patient is age 18 or older     Refill protocol is for patient's aged 18 years and older          Passed - No active pregnancy on record     Protocol will fail if there is documentation pregnancy episode or positive test on file  within the past 12 months          Passed - No positive pregnancy test in past year         Zahra BUSH RN  [facility]/Red Flag Triage & Med Refill Team

## 2025-05-13 NOTE — TELEPHONE ENCOUNTER
BISMARKM for pt to c/b and discuss     Ilene SHERIDAN RN Care Coordinator  [facility]     - - -

## 2025-05-13 NOTE — PROGRESS NOTES
{PROVIDER CHARTING PREFERENCE:445078}    DM II- On mounjaro 15mg/d, and she is doing well on it, reports being hungrier on mounjaro (though she's also increased insulin lately- now at lantus 24 units/d, and sliding scale). A1C down to 8.1, even in setting of gout flares/prednisone the last few months.  Met with MTM after our appt today, and started jardiance/SGLT2i (DM/CKD/HFpEF indications)  She did trial it in 2019, and they noted that her GFR fell from 50-60 to 36 after two weeks.  They will try again, with frequent lab checks.  Continue follow-up with MTM, appreciate cares.    HTN/GFR- BP in good control. GRF heading down, possibly due to worsening A1C. Not on ace/arb due to lower BPs.    Sleepiness- pt notes her fatigue/sleepiness is her main concern.  Reviewed chart- her last appt with sleep clinic in '21.  Pt then notes her sleep apnea machine broke- borrowed someone elses.  Strongly rec follow-up asap with sleep clinic.    Follicular bronchitis- Dr. Tillman, seeing her regularly.  Will makes sure rx's come fro her.    Urology- hard to remember to use her estrogen ? Vagifem capsule, ? May be easier for her.    Rheum/Gout- Most recent gout flare improved with kineret injections, very pleased, though she needed prednisone for left wrist gout flare prior. Now back to her baseline prednisone 5mg/d, and continues on allopurinol 100mg/d.    CHF- continue follow-up with Dr. Rosa/[facility].    Follow-up in 4 months with A1C and BMP prior.          Daniel Hernández is a 85 year old, presenting for the following health issues:  Diabetes        5/13/2025     2:23 PM   Additional Questions   Roomed by Francesca SEN   Accompanied by Nghia health care helper     History of Present Illness       Diabetes:   She presents for follow up of diabetes.  She is checking home blood glucose two times daily.   She checks blood glucose before meals.  Blood glucose is sometimes over 200 and never under 70. She is aware of hypoglycemia  symptoms including shakiness and weakness.   She is concerned about blood sugar frequently over 200.   She is having numbness in feet, burning in feet, excessive thirst and weight gain.            Heart Failure:  She presents for follow up of heart failure. She is experiencing shortness of breath with activity only,  She is experiencing lower extremity edema which is same as usual.   She denies orthopenea and is not coughing at night. Patient is not checking weight daily. She has recently had a weight increase.  She has side effects from medications including dizziness and fatigue.  She has had no other medical visits for heart failure since the last visit.    She eats 0-1 servings of fruits and vegetables daily.She consumes 0 sweetened beverage(s) daily.She exercises with enough effort to increase her heart rate 20 to 29 minutes per day.  She exercises with enough effort to increase her heart rate 4 days per week.   She is taking medications regularly.        Doing well overall.    DM II- on mounjaro - 15mg/wk.  Not losing weight.  Feels fine on it.  No constipation.  Hungrier on it.      Gout flare - prednisone taper starting 4/14/25 through Dr. Lopez (rheum).  Left wrist pain.  Tried kineret x 3 days- did work for R wrist prior to that time.    Back to her prednisone 5mg/d. Usual baseline.    Lungs are okay...      Main issue- terrible exhaustion, even with a good night's sleep.  Yesterday morning- zoom as usual.  Could hard to function.  Hard to stay awake- sleepiness and fatigue.    Does use CPAP.  Hasn't had it adjusted- using someone's machine.      Yvette's sister was missing- has dementia.  Still coming into their apartment.  Dealing with it a bit better, ignoring her, which makes Yvette upset.      Hip pain is doing great-     Urinary urgency and incontinence-   Estrogen - vaginal-  harder to remember - vaginal- ends up using it 1-2x/wk.  Will try and to more.  Could try and see if vagifem ovule would be  easier.    {MA/LPN/RN Pre-Provider Visit Orders- hCG/UA/Strep (Optional):035483}  {SUPERLIST (Optional):519252}  {additonal problems for provider to add (Optional):854764}    {ROS Picklists (Optional):040088}      Objective    LMP  (LMP Unknown)   There is no height or weight on file to calculate BMI.  Physical Exam   {Exam List (Optional):063729}    {Diagnostic Test Results (Optional):007235}        Signed Electronically by: Ilene Tristan MD  {Email feedback regarding this note to [email or web address]   :[id number]}   normal to inspection, PERRL and conjunctivae and sclerae normal  NECK: no adenopathy, no asymmetry, masses, or scars  RESP: lungs clear to auscultation - no rales, rhonchi or wheezes  CV: regular rate and rhythm, normal S1 S2, no S3 or S4, no murmur, click or rub, no peripheral edema  ABDOMEN: soft, nontender, no hepatosplenomegaly, no masses and bowel sounds normal  MS: no gross musculoskeletal defects noted, no edema  PSYCH: mentation appears normal, affect normal/bright      Lab on 03/12/2025   Component Date Value Ref Range Status    Cholesterol 03/12/2025 123  <200 mg/dL Final    Triglycerides 03/12/2025 89  <150 mg/dL Final    Direct Measure HDL 03/12/2025 72  >=50 mg/dL Final    LDL Cholesterol Calculated 03/12/2025 33  <100 mg/dL Final    Non HDL Cholesterol 03/12/2025 51  <130 mg/dL Final    Patient Fasting > 8hrs? 03/12/2025 No   Final    Sodium 03/12/2025 142  135 - 145 mmol/L Final    Potassium 03/12/2025 4.1  3.4 - 5.3 mmol/L Final    Carbon Dioxide (CO2) 03/12/2025 26  22 - 29 mmol/L Final    Anion Gap 03/12/2025 11  7 - 15 mmol/L Final    Urea Nitrogen 03/12/2025 16.7  8.0 - 23.0 mg/dL Final    Creatinine 03/12/2025 1.07 (H)  0.51 - 0.95 mg/dL Final    GFR Estimate 03/12/2025 51 (L)  >60 mL/min/1.73m2 Final    eGFR calculated using 2021 CKD-EPI equation.    Calcium 03/12/2025 9.7  8.8 - 10.4 mg/dL Final    Chloride 03/12/2025 105  98 - 107 mmol/L Final    Glucose 03/12/2025 246 (H)  70 - 99 mg/dL Final    Alkaline Phosphatase 03/12/2025 69  40 - 150 U/L Final    AST 03/12/2025 19  0 - 45 U/L Final    ALT 03/12/2025 12  0 - 50 U/L Final    Protein Total 03/12/2025 7.2  6.4 - 8.3 g/dL Final    Albumin 03/12/2025 4.1  3.5 - 5.2 g/dL Final    Bilirubin Total 03/12/2025 0.6  <=1.2 mg/dL Final    Patient Fasting > 8hrs? 03/12/2025 No   Final    Creatinine Urine mg/dL 03/12/2025 10.6  mg/dL Final    The reference ranges have not been established in urine creatinine. The results should be integrated into the  clinical context for interpretation.    Albumin Urine mg/L 03/12/2025 19.0  mg/L Final    The reference ranges have not been established in urine albumin. The results should be integrated into the clinical context for interpretation.    Albumin Urine mg/g Cr 03/12/2025 179.25 (H)  0.00 - 25.00 mg/g Cr Final    Microalbuminuria is defined as an albumin:creatinine ratio of 17 to 299 for males and 25 to 299 for females. A ratio of albumin:creatinine of 300 or higher is indicative of overt proteinuria.  Due to biologic variability, positive results should be confirmed by a second, first-morning random or 24-hour timed urine specimen. If there is discrepancy, a third specimen is recommended. When 2 out of 3 results are in the microalbuminuria range, this is evidence for incipient nephropathy and warrants increased efforts at glucose control, blood pressure control, and institution of therapy with an angiotensin-converting-enzyme (ACE) inhibitor (if the patient can tolerate it).      TSH 03/12/2025 4.10  0.30 - 4.20 uIU/mL Final    C4 Complement 03/12/2025 26  13 - 39 mg/dL Final    C3 Complement 03/12/2025 122  81 - 157 mg/dL Final    CRP Inflammation 03/12/2025 12.60 (H)  <5.00 mg/L Final    Erythrocyte Sedimentation Rate 03/12/2025 18  0 - 30 mm/hr Final    Color Urine 03/12/2025 Straw  Colorless, Straw, Light Yellow, Yellow Final    Appearance Urine 03/12/2025 Clear  Clear Final    Glucose Urine 03/12/2025 70 (A)  Negative mg/dL Final    Bilirubin Urine 03/12/2025 Negative  Negative Final    Ketones Urine 03/12/2025 Negative  Negative mg/dL Final    Specific Gravity Urine 03/12/2025 1.008  1.003 - 1.035 Final    Blood Urine 03/12/2025 Negative  Negative Final    pH Urine 03/12/2025 5.5  5.0 - 7.0 Final    Protein Albumin Urine 03/12/2025 Negative  Negative mg/dL Final    Urobilinogen Urine 03/12/2025 Normal  Normal, 2.0 mg/dL Final    Nitrite Urine 03/12/2025 Negative  Negative Final    Leukocyte Esterase Urine  03/12/2025 Negative  Negative Final    RBC Urine 03/12/2025 4 (H)  <=2 /HPF Final    WBC Urine 03/12/2025 7 (H)  <=5 /HPF Final    Hyaline Casts Urine 03/12/2025 3 (H)  <=2 /LPF Final    Total Protein Urine mg/dL 03/12/2025 <6.0    mg/dL Final    Total Protein Urine mg/mg Creat 03/12/2025    Final    Unable to calculate, urine creatinine or protein is outside the detectable limits.    Creatinine Urine mg/dL 03/12/2025 10.4  mg/dL Final    Uric Acid 03/12/2025 4.9  2.4 - 5.7 mg/dL Final    Cryoglobulin Interpretation 03/12/2025 Negative  Negative Final    WBC Count 03/12/2025 8.3  4.0 - 11.0 10e3/uL Final    RBC Count 03/12/2025 4.37  3.80 - 5.20 10e6/uL Final    Hemoglobin 03/12/2025 13.2  11.7 - 15.7 g/dL Final    Hematocrit 03/12/2025 39.8  35.0 - 47.0 % Final    MCV 03/12/2025 91  78 - 100 fL Final    MCH 03/12/2025 30.2  26.5 - 33.0 pg Final    MCHC 03/12/2025 33.2  31.5 - 36.5 g/dL Final    RDW 03/12/2025 14.5  10.0 - 15.0 % Final    Platelet Count 03/12/2025 179  150 - 450 10e3/uL Final    % Neutrophils 03/12/2025 75  % Final    % Lymphocytes 03/12/2025 13  % Final    % Monocytes 03/12/2025 9  % Final    % Eosinophils 03/12/2025 2  % Final    % Basophils 03/12/2025 1  % Final    % Immature Granulocytes 03/12/2025 1  % Final    NRBCs per 100 WBC 03/12/2025 0  <1 /100 Final    Absolute Neutrophils 03/12/2025 6.2  1.6 - 8.3 10e3/uL Final    Absolute Lymphocytes 03/12/2025 1.1  0.8 - 5.3 10e3/uL Final    Absolute Monocytes 03/12/2025 0.7  0.0 - 1.3 10e3/uL Final    Absolute Eosinophils 03/12/2025 0.2  0.0 - 0.7 10e3/uL Final    Absolute Basophils 03/12/2025 0.1  0.0 - 0.2 10e3/uL Final    Absolute Immature Granulocytes 03/12/2025 0.0  <=0.4 10e3/uL Final    Absolute NRBCs 03/12/2025 0.0  10e3/uL Final    Total Protein Serum for ELP 03/12/2025 6.9  6.4 - 8.3 g/dL Final    Albumin 03/12/2025 3.8  3.7 - 5.1 g/dL Final    Alpha 1 03/12/2025 0.3  0.2 - 0.4 g/dL Final    Alpha 2 03/12/2025 0.8  0.5 - 0.9 g/dL Final     Beta Globulin 03/12/2025 1.0  0.6 - 1.0 g/dL Final    Gamma Globulin 03/12/2025 1.0  0.7 - 1.6 g/dL Final    Monoclonal Peak 03/12/2025 0.0  <=0.0 g/dL Final    ELP Interpretation 03/12/2025    Final                    Value:Essentially normal electrophoretic pattern except for some suggestion of beta-gamma bridging raising the possibility of chronic liver disease. No obvious monoclonal proteins seen. Pathologic significance requires clinical correlation. CLARY Maradiaga M.D., Ph.D., Pathologist ().    Signout Location if Remote 03/12/2025 [facility]   Final     Results for orders placed or performed in visit on 05/13/25   Hemoglobin A1c     Status: Abnormal   Result Value Ref Range    Estimated Average Glucose 186 (H) <117 mg/dL    Hemoglobin A1C 8.1 (H) 0.0 - 5.6 %    Narrative    Results consistent with previous, repeat testing unnecessary    Extra Tube     Status: None    Narrative    The following orders were created for panel order Extra Tube.  Procedure                               Abnormality         Status                     ---------                               -----------         ------                     Extra Red Top Tube[[id number]]                              Final result               Extra Green Top (Lithiu...[[id number]]                      Final result                 Please view results for these tests on the individual orders.   Extra Red Top Tube     Status: None   Result Value Ref Range    Hold Specimen JIC    Extra Green Top (Lithium Heparin) Tube     Status: None   Result Value Ref Range    Hold Specimen JIC            Signed Electronically by: Ilene Tristan MD

## 2025-05-13 NOTE — PROGRESS NOTES
ELECTROPHYSIOLOGY CLINIC VISIT    Assessment/Recommendations   Assessment/Plan:    Ms. Tee is an 85 year old female with medical history pertinent for interstitial lung disease (moderate restriction), Sjogren's syndrome, HTN, hx GIB, atrial fibrillation s/p Watchman, diverticulitis s/p colectomy 2011, and HFpEF     Paroxysmal Atrial Fibrillation:   I had a long discussion with the patient about AF, including the natural history of the disease, risk of embolic events, role of antithrombotic therapy, role of rate control therapy, the role of antiarrhythmic medications, and the role of an ablation.   1. Stroke Prophylaxis: CHADSVASC ++age, +female, +CHF, +HTN 5, corresponding to a 6.7% annual stroke / systemic embolism event rate. S/p watchman procedure 9/26/2022 due to hx GAVE/GIB and intolerance of AC  2. Rate Control: Patient was started on diltiazem at last CORE follow up in 03/2025 as a replacement for metoprolol due to her dyspnea and HFpEF. She reportedly experienced more AF symptoms  with this change and started taking metoprolol again. Originally was taking 50mg BID, but was experiencing significant dizziness. Toprol was then decreased to 25mg AM and 50mg PM. We will further decrease to 25mg BID due to continued dizziness. RN to reach out in 2 weeks to assess symptoms.   - No carotid bruits on auscultation. Instructed patient to notify provider if head position continues to affect dizziness or if she begins to experience visual changes or presyncope.  3. Rhythm Control: Cardioversion, Antiarrhythmics and/or ablation are options for rhythm control. Patient with very sporadic AF and no symptoms reported today. Will continue with BB only.\  4. Risk Factor Management: Statin, BP control, and ANGELICA evaluation as indicated.       Follow up 1 year        History of Present Illness/Subjective    Ms. Betty Tee is a 85 year old female who comes in today for EP follow-up of AF.    She has not been seen by  EP in some time with last note available from 2018 as it was decided at that time she would only follow up on a PRN basis. She was first diagnosed with A.fib in 10/2011 during an inpatient stay for diverticulitis which ultimately required partial colectomy, Her A.fib at that time presented w/ RVR and pulmonary edema. She was treated with IV metoprolol and diuretics, followied by diltiazem and ultimately discharged on warfarin. Aside from pulmonary edema, A.fib was not symptomatic. TTE 10/2011 showed LVEF=55-60%, diastolic dysfunction/high filling pressures with no other significant structural nidus for A.fib. She had paroxysmal A.fib subsequently after discharge from that admission. [employer] event monitor 1/21/13-2/3/13 showed 1 episode of palpitations, correlated with 1 episode of A.fib lasting 4 hrs with VR up to 132. At last EP visit, it was determined that focus of patient's AF management would be rate-controlled due to its longstanding and asymptomatic nature. During CORE follow up in 2020, patient was noted to be in NSR on exam and during echocardiogram.    Pt was last seen by Bharti Jones 3/12/25 and diltiazem was started.  At her last CORE visit we had taken her off metoprolol to help with SOB symptoms and started diltiazem. She was noticing more afib sx from this, and went back to metoprolol T 50MG bid but was having more lightheadedness. Her dose was then decreased to 25mg AM and 50mg PM. She reports an improvement in her dizziness, but reports that she still feels dizziness, particularly with turning her head to the left side. She denies having any palpitations since switching back from diltiazem to toprol. She feels like overall she is doing well from a cardiac standpoint.     She denies chest discomfort, palpitations, abdominal fullness/bloating or peripheral edema, shortness of breath, paroxysmal nocturnal dyspnea, orthopnea, lightheadedness, dizziness, pre-syncope, or syncope. Presenting 12 lead  ECG shows SB with 1AVB; Vent Rate 60 bpm,  ms, QRS 68 ms, QTc 450 ms. Current cardiac medications include: Torsemide, Toprol, Jardiance, Lipitor, ASA.       I have reviewed and updated the patient's Past Medical History, Social History, Family History and Medication List.     Cardiographics (Personally Reviewed) :   Echo 9/12/24:   Interpretation Summary  Global and regional left ventricular function is normal with an EF of 60-65%.  Diastolic Doppler findings (E/E' ratio and/or other parameters) suggest left  ventricular filling pressures are increased.  Global right ventricular function is normal.  The inferior vena cava was normal in size with preserved respiratory  variability.     This study was compared with the study from 9/7/2023. There are no significant  changes.  _______________________________________________________________  Left Ventricle  Global and regional left ventricular function is normal with an EF of 60-65%.  Left ventricular size is normal. Relative wall thickness is increased  consistent with concentric remodeling. Left ventricular diastolic function is  indeterminate. Diastolic Doppler findings (E/E' ratio and/or other parameters)  suggest left ventricular filling pressures are increased. No regional wall  motion abnormalities are seen.     Right Ventricle  The right ventricle is normal size. Global right ventricular function is  normal.     Atria  Both atria appear normal.     Mitral Valve  The mitral valve is normal.     Aortic Valve  Aortic valve is normal in structure and function. The aortic valve is  tricuspid.     Tricuspid Valve  The tricuspid valve is normal. Mild tricuspid insufficiency is present. The  peak velocity of the tricuspid regurgitant jet is not obtainable. Pulmonary  artery systolic pressure cannot be assessed.     Pulmonic Valve  The pulmonic valve is normal. Trace to mild pulmonic insufficiency is present.     Vessels  The aorta root is normal. The thoracic aorta  is normal. The inferior vena cava  was normal in size with preserved respiratory variability.     Pericardium  No pericardial effusion is present. Prominent epicardial fat is noted.     Compared to Previous Study  This study was compared with the study from 9/7/2023 .    [facility] 6/6/23:       CTA Structure Heart 11/10/2022:  1.  There is a Watchman 24 mm closure device in the left atrial  appendage. It is well-seated.   2.  There is no thrombus on the left atrial side of the closure device  on early or delayed imaging.   3.  There is no contrast opacification of the left atrial appendage,  implying complete endothelialization of the occlusion device.   4.  Normal pulmonary venous anatomy with all pulmonary veins draining  into the left atrium.  5.  Coronary images are non-diagnostic for stenosis/atherosclerosis  due to cardiac motion artifact.   6.  The visualized aortic root, ascending aorta and descending  thoracic aorta are normal.  7.    Please review the separate Radiology report from the original study  site and date for incidental noncardiac findings. This report will  follow separately.     Physical Examination   LMP  (LMP Unknown)   Wt Readings from Last 3 Encounters:   05/13/25 83.6 kg (184 lb 3.2 oz)   03/12/25 82.9 kg (182 lb 11.2 oz)   01/23/25 81.6 kg (180 lb)     General Appearance:   Alert, well-appearing and in no acute distress.   HEENT: Atraumatic, normocephalic. PERRL.  MMM.   Chest/Lungs:   Respirations unlabored.  Lungs are clear to auscultation.   Cardiovascular:   Regular rate and rhythm.  S1/S2. No murmur. No carotid bruits on auscultation   Abdomen:  Soft, nontender, nondistended.   Extremities: No cyanosis or clubbing. No edema.    Musculoskeletal: Moves all extremities.     Skin: Warm, dry, intact.    Neurologic: Mood and affect are appropriate.  Alert and oriented to person, place, time, and situation.          Medications  Allergies   Current Outpatient Medications   Medication Sig Dispense  Refill    acyclovir (ZOVIRAX) 400 MG tablet Take 1 tablet (400 mg) by mouth every 8 hours for 7 days. For herpes outbreak 21 tablet 0    acyclovir (ZOVIRAX) 5 % external ointment Apply topically as needed (6 times day PRN for outbreaks). As needed for outbreaks 15 g 2    albuterol (PROAIR HFA/PROVENTIL HFA/VENTOLIN HFA) 108 (90 Base) MCG/ACT inhaler Inhale 2 puffs into the lungs every 6 hours as needed for wheezing or shortness of breath      allopurinol (ZYLOPRIM) 100 MG tablet TAKE 1 TABLET BY MOUTH EVERY DAY 90 tablet 1    anakinra (KINERET) 100 MG/0.67ML SOSY injection Inject 0.67 mLs (100 mg) subcutaneously daily. 20.1 mL 3    aspirin (ASA) 81 MG EC tablet Take 1 tablet (81 mg) by mouth 2 times daily 60 tablet 0    atorvastatin (LIPITOR) 10 MG tablet Take 0.5 tablets (5 mg) by mouth daily. 45 tablet 1    azithromycin (ZITHROMAX) 250 MG tablet TAKE 1 TABLET BY MOUTH EVERY DAY 30 tablet 11    blood glucose (NO BRAND SPECIFIED) lancets standard Use to test blood sugar 3 times daily or as directed 100 Lancet 3    BREO ELLIPTA 100-25 MCG/ACT inhaler TAKE 1 PUFF BY MOUTH EVERY DAY 1 each 11    cevimeline (EVOXAC) 30 MG capsule Take 1 capsule (30 mg) by mouth 3 times daily as needed 270 capsule 3    Cholecalciferol (VITAMIN D3) 50 MCG (2000 UT) CAPS TAKE 100 MCG BY MOUTH DAILY (TAKE 2 TABLET (50 MCG) BY MOUTH DAILY - ORAL) (Patient taking differently: Take 1,000 Units by mouth daily. (Take 2 tablet (2000 units by mouth daily - Oral)) 180 capsule 3    COMPOUNDED NON-CONTROLLED SUBSTANCE (CMPD RX) - PHARMACY TO MIX COMPOUNDED MEDICATION Estriol 1 mg/g Apply small amount to finger and apply to inside vagina daily for 2 weeks then twice weekly Route: vaginally Dispense 30 grams 11 refills 30 g 11    CONTOUR NEXT TEST test strip USE TO TEST BLOOD SUGARS 3 TIMES DAILY 300 strip 1    cyanocobalamin (VITAMIN B-12) 1000 MCG tablet Take 1 tablet (1,000 mcg) by mouth three times a week      empagliflozin (JARDIANCE) 10 MG TABS  tablet Take 1 tablet (10 mg) by mouth daily. 90 tablet 1    escitalopram (LEXAPRO) 20 MG tablet TAKE 1 TABLET BY MOUTH EVERY DAY 30 tablet 0    fluticasone (FLONASE) 50 MCG/ACT nasal spray SPRAY 1-2 SPRAYS INTO BOTH NOSTRILS DAILY AS NEEDED FOR ALLERGIES 16 mL 11    hydroxychloroquine (PLAQUENIL) 200 MG tablet TAKE 1 TABLET (200 MG) BY MOUTH DAILY GET ANNUAL EYE EXAMS FOR MONITORING AND FAX [phone] 90 tablet 3    insulin glargine (LANTUS SOLOSTAR) 100 UNIT/ML pen Inject 24 Units subcutaneously at bedtime. 30 mL 0    insulin lispro (HUMALOG KWIKPEN) 100 UNIT/ML (1 unit dial) KWIKPEN Sliding scale insulin; tests BG three times day If BG <150, no insulin given If -200 take 8 units If -250 take 10 units If -300 take 12 units If -350 take 16 units If BG >350 take 20 units 15 mL 1    insulin pen needle (BD PEN NEEDLE JANE 2ND GEN) 32G X 4 MM miscellaneous USE TO TEST 4 TIMES A  each 1    ketoconazole (NIZORAL) 2 % external cream Apply topically 2 times daily as needed for itching 60 g 1    levocetirizine (XYZAL) 5 MG tablet TAKE 1 TABLET BY MOUTH EVERY DAY IN THE EVENING 90 tablet 1    lidocaine (LIDODERM) 5 % patch APPLY PATCH TO PAINFUL AREA FOR UP TO 12 H WITHIN A 24 H PERIOD. REMOVE AFTER 12 HOURS. 30 patch 2    loratadine (CLARITIN) 10 MG tablet TAKE 1 TABLET BY MOUTH EVERY DAY 90 tablet 2    methocarbamol (ROBAXIN) 750 MG tablet Take 1 tablet (750 mg) by mouth 3 times daily as needed for muscle spasms 90 tablet 3    metoprolol succinate ER (TOPROL XL) 50 MG 24 hr tablet Take 0.5 tablets (25 mg) by mouth every morning AND 1 tablet (50 mg) every evening. 135 tablet 3    mirabegron (MYRBETRIQ) 25 MG 24 hr tablet Take 1 tablet (25 mg) by mouth daily. 30 tablet 2    mirabegron (MYRBETRIQ) 25 MG 24 hr tablet Take 1 tablet (25 mg) by mouth daily. For additional refills, please schedule a follow-up appointment. 30 tablet 1    montelukast (SINGULAIR) 10 MG tablet TAKE 1 TABLET BY MOUTH  EVERYDAY AT BEDTIME 90 tablet 3    MOUNJARO 15 MG/0.5ML SOAJ Inject 0.5 mLs (15 mg) subcutaneously every 7 days. 6 mL 3    oxyCODONE-acetaminophen (PERCOCET) 5-325 MG tablet Take 1 tablet by mouth every 8 hours as needed for pain. 60 tablet 0    pantoprazole (PROTONIX) 40 MG EC tablet TAKE 1 TABLET (40 MG) BY MOUTH DAILY (REPLACES FAMOTIDINE- SHOULD STOP TAKING FAMOTIDINE) 90 tablet 1    polyethylene glycol (MIRALAX) 17 g packet Take 17 g by mouth daily 10 packet 0    potassium chloride ashwini ER (KLOR-CON M20) 20 MEQ CR tablet TAKE 2 TABLETS (40 MEQ) BY MOUTH EVERY MORNING AND 1 TABLET (20 MEQ) EVERY EVENING. 270 tablet 3    predniSONE (DELTASONE) 10 MG tablet TAKE 40mg daily for 3 days, then 30mg daily for 3 days, then 20mg daily for 3 days then 10mg daily for 3 days AS NEEDED FOR GOUT FLARES, THEN DROP BACK TO BASELINE 5 MG EVERY DAY. 30 tablet 3    predniSONE (DELTASONE) 5 MG tablet Take 1 tablet (5 mg) by mouth daily. 90 tablet 3    predniSONE (DELTASONE) 5 MG tablet Take 1 tablet (5 mg) by mouth daily. 90 tablet 1    torsemide (DEMADEX) 20 MG tablet Take 2 tablets (40 mg) by mouth every morning AND 1 tablet (20 mg) daily at 2 pm. 270 tablet 3    traZODone (DESYREL) 50 MG tablet Take 1-2 tablets ( mg) by mouth at bedtime. 90 tablet 0    Allergies   Allergen Reactions    Amoxicillin-Pot Clavulanate Nausea and Vomiting    Amoxicillin-Pot Clavulanate     Codeine Nausea and Vomiting     PN: LW Reaction: HIVES    Penicillins Nausea and Vomiting     PN: LW Reaction: GI Upset    Phenobarbital Itching    Seasonal Allergies          Lab Results (Personally Reviewed)    Chemistry/lipid CBC Cardiac Enzymes/BNP/TSH/INR   Lab Results   Component Value Date    BUN 16.7 03/12/2025     03/12/2025    CO2 26 03/12/2025     Creatinine   Date Value Ref Range Status   03/12/2025 1.07 (H) 0.51 - 0.95 mg/dL Final   06/04/2021 1.11 (H) 0.52 - 1.04 mg/dL Final       Lab Results   Component Value Date    CHOL 123 03/12/2025     HDL 72 03/12/2025    LDL 33 03/12/2025      Lab Results   Component Value Date    WBC 8.3 03/12/2025    HGB 13.2 03/12/2025    HCT 39.8 03/12/2025    MCV 91 03/12/2025     03/12/2025    Lab Results   Component Value Date    TSH 4.10 03/12/2025    INR 1.43 (H) 11/10/2022        The patient states understanding and is agreeable with the plan.   Aruna Tucker PA-C  Electrophysiology Consult Service  Securely message with [employer]   Text page via [facility] Paging/Directory           Total time spent on patient visit, reviewing notes, imaging, labs, orders, and completing necessary documentation: 30 minutes.

## 2025-05-14 ENCOUNTER — PATIENT OUTREACH (OUTPATIENT)
Dept: CARE COORDINATION | Facility: CLINIC | Age: 85
End: 2025-05-14
Payer: COMMERCIAL

## 2025-05-15 ENCOUNTER — OFFICE VISIT (OUTPATIENT)
Dept: CARDIOLOGY | Facility: CLINIC | Age: 85
End: 2025-05-15
Payer: COMMERCIAL

## 2025-05-15 ENCOUNTER — TELEPHONE (OUTPATIENT)
Dept: FAMILY MEDICINE | Facility: CLINIC | Age: 85
End: 2025-05-15

## 2025-05-15 ENCOUNTER — RESULTS FOLLOW-UP (OUTPATIENT)
Dept: FAMILY MEDICINE | Facility: CLINIC | Age: 85
End: 2025-05-15

## 2025-05-15 VITALS
OXYGEN SATURATION: 95 % | BODY MASS INDEX: 30.99 KG/M2 | SYSTOLIC BLOOD PRESSURE: 125 MMHG | HEART RATE: 61 BPM | WEIGHT: 186.2 LBS | DIASTOLIC BLOOD PRESSURE: 77 MMHG

## 2025-05-15 DIAGNOSIS — I50.30 HEART FAILURE WITH PRESERVED EJECTION FRACTION, UNSPECIFIED HF CHRONICITY (H): ICD-10-CM

## 2025-05-15 DIAGNOSIS — I48.21 PERMANENT ATRIAL FIBRILLATION (H): Primary | ICD-10-CM

## 2025-05-15 LAB
ATRIAL RATE - MUSE: 60 BPM
DIASTOLIC BLOOD PRESSURE - MUSE: NORMAL MMHG
INTERPRETATION ECG - MUSE: NORMAL
P AXIS - MUSE: 79 DEGREES
PR INTERVAL - MUSE: 238 MS
QRS DURATION - MUSE: 68 MS
QT - MUSE: 450 MS
QTC - MUSE: 450 MS
R AXIS - MUSE: 31 DEGREES
SYSTOLIC BLOOD PRESSURE - MUSE: NORMAL MMHG
T AXIS - MUSE: 62 DEGREES
VENTRICULAR RATE- MUSE: 60 BPM

## 2025-05-15 PROCEDURE — G0463 HOSPITAL OUTPT CLINIC VISIT: HCPCS

## 2025-05-15 PROCEDURE — 93005 ELECTROCARDIOGRAM TRACING: CPT

## 2025-05-15 RX ORDER — METOPROLOL SUCCINATE 25 MG/1
25 TABLET, EXTENDED RELEASE ORAL DAILY
Qty: 90 TABLET | Refills: 4 | Status: SHIPPED | OUTPATIENT
Start: 2025-05-15 | End: 2025-05-15

## 2025-05-15 RX ORDER — METOPROLOL SUCCINATE 25 MG/1
25 TABLET, EXTENDED RELEASE ORAL 2 TIMES DAILY
Qty: 180 TABLET | Refills: 4 | Status: SHIPPED | OUTPATIENT
Start: 2025-05-15

## 2025-05-15 ASSESSMENT — PAIN SCALES - GENERAL: PAINLEVEL_OUTOF10: NO PAIN (0)

## 2025-05-15 NOTE — PATIENT INSTRUCTIONS
Plan:    Decrease Toprol XL to 25mg twice per day  Follow up in 1 year or sooner if there are concerns      Your Care Team:  EP Cardiology   Telephone Number     Ke Rodriguez RN [phone]    After business hours: [phone], ask for cardiologist on-call   Non-procedure scheduling:    Elizabeth BUSH   [phone]   Procedure scheduling:    Kati Carter   [phone]   Device Clinic (Pacemakers, ICDs, Loop Recorders)    During business hours: [phone]  After business hours:   [phone]- select option 4 and ask for job code [id number].       Cardiovascular Clinic:   [address] [place] [address]. [place] [address]      As always, thank you for trusting us with your health care needs!    If you have further questions, please utilize [facility] to contact us.

## 2025-05-15 NOTE — TELEPHONE ENCOUNTER
CW,  Please see below message  Appears follow up from OV 5/13/25  No available VV 5/20, unless double book is preferred  Please advise  Thanks,  Elise RIVERA RN

## 2025-05-15 NOTE — NURSING NOTE
Chief Complaint   Patient presents with    Follow Up     RETURN EP     Vitals were taken, medications reconciled, and EKG was performed.    You Adamson EMT  10:23 AM

## 2025-05-15 NOTE — LETTER
5/15/2025      RE: Betty Tee  [address]  [place] [address]       Dear Colleague,    Thank you for the opportunity to participate in the care of your patient, Betty Tee, at the [facility] [place] [facility]. Please see a copy of my visit note below.        ELECTROPHYSIOLOGY CLINIC VISIT    Assessment/Recommendations   Assessment/Plan:    Ms. Tee is an 85 year old female with medical history pertinent for interstitial lung disease (moderate restriction), Sjogren's syndrome, HTN, hx GIB, atrial fibrillation s/p Watchman, diverticulitis s/p colectomy 2011, and HFpEF     Paroxysmal Atrial Fibrillation:   I had a long discussion with the patient about AF, including the natural history of the disease, risk of embolic events, role of antithrombotic therapy, role of rate control therapy, the role of antiarrhythmic medications, and the role of an ablation.   1. Stroke Prophylaxis: CHADSVASC ++age, +female, +CHF, +HTN 5, corresponding to a 6.7% annual stroke / systemic embolism event rate. S/p watchman procedure 9/26/2022 due to hx GAVE/GIB and intolerance of AC  2. Rate Control: Patient was started on diltiazem at last CORE follow up in 03/2025 as a replacement for metoprolol due to her dyspnea and HFpEF. She reportedly experienced more AF symptoms  with this change and started taking metoprolol again. Originally was taking 50mg BID, but was experiencing significant dizziness. Toprol was then decreased to 25mg AM and 50mg PM. We will further decrease to 25mg BID due to continued dizziness. RN to reach out in 2 weeks to assess symptoms.   - No carotid bruits on auscultation. Instructed patient to notify provider if head position continues to affect dizziness or if she begins to experience visual changes or presyncope.  3. Rhythm Control: Cardioversion, Antiarrhythmics and/or ablation are options for rhythm control.  Patient with very sporadic AF and no symptoms reported today. Will continue with BB only.\  . Risk Factor Management: Statin, BP control, and ANGELICA evaluation as indicated.       Follow up 1 year        History of Present Illness/Subjective    Ms. Betty Tee is a 85 year old female who comes in today for EP follow-up of AF.    She has not been seen by EP in some time with last note available from 2018 as it was decided at that time she would only follow up on a PRN basis. She was first diagnosed with A.fib in 10/2011 during an inpatient stay for diverticulitis which ultimately required partial colectomy, Her A.fib at that time presented w/ RVR and pulmonary edema. She was treated with IV metoprolol and diuretics, followied by diltiazem and ultimately discharged on warfarin. Aside from pulmonary edema, A.fib was not symptomatic. TTE 10/2011 showed LVEF=55-60%, diastolic dysfunction/high filling pressures with no other significant structural nidus for A.fib. She had paroxysmal A.fib subsequently after discharge from that admission. ReVision Optic[employer]ch event monitor 1/21/13-2/3/13 showed 1 episode of palpitations, correlated with 1 episode of A.fib lasting 4 hrs with VR up to 132. At last EP visit, it was determined that focus of patient's AF management would be rate-controlled due to its longstanding and asymptomatic nature. During CORE follow up in 2020, patient was noted to be in NSR on exam and during echocardiogram.    Pt was last seen by Bharti Jones 3/12/25 and diltiazem was started.  At her last CORE visit we had taken her off metoprolol to help with SOB symptoms and started diltiazem. She was noticing more afib sx from this, and went back to metoprolol T 50MG bid but was having more lightheadedness. Her dose was then decreased to 25mg AM and 50mg PM. She reports an improvement in her dizziness, but reports that she still feels dizziness, particularly with turning her head to the left side. She denies having any  palpitations since switching back from diltiazem to toprol. She feels like overall she is doing well from a cardiac standpoint.     She denies chest discomfort, palpitations, abdominal fullness/bloating or peripheral edema, shortness of breath, paroxysmal nocturnal dyspnea, orthopnea, lightheadedness, dizziness, pre-syncope, or syncope. Presenting 12 lead ECG shows SB with 1AVB; Vent Rate 60 bpm,  ms, QRS 68 ms, QTc 450 ms. Current cardiac medications include: Torsemide, Toprol, Jardiance, Lipitor, ASA.       I have reviewed and updated the patient's Past Medical History, Social History, Family History and Medication List.     Cardiographics (Personally Reviewed) :   Echo 9/12/24:   Interpretation Summary  Global and regional left ventricular function is normal with an EF of 60-65%.  Diastolic Doppler findings (E/E' ratio and/or other parameters) suggest left  ventricular filling pressures are increased.  Global right ventricular function is normal.  The inferior vena cava was normal in size with preserved respiratory  variability.     This study was compared with the study from 9/7/2023. There are no significant  changes.  _______________________________________________________________  Left Ventricle  Global and regional left ventricular function is normal with an EF of 60-65%.  Left ventricular size is normal. Relative wall thickness is increased  consistent with concentric remodeling. Left ventricular diastolic function is  indeterminate. Diastolic Doppler findings (E/E' ratio and/or other parameters)  suggest left ventricular filling pressures are increased. No regional wall  motion abnormalities are seen.     Right Ventricle  The right ventricle is normal size. Global right ventricular function is  normal.     Atria  Both atria appear normal.     Mitral Valve  The mitral valve is normal.     Aortic Valve  Aortic valve is normal in structure and function. The aortic valve is  tricuspid.     Tricuspid  Valve  The tricuspid valve is normal. Mild tricuspid insufficiency is present. The  peak velocity of the tricuspid regurgitant jet is not obtainable. Pulmonary  artery systolic pressure cannot be assessed.     Pulmonic Valve  The pulmonic valve is normal. Trace to mild pulmonic insufficiency is present.     Vessels  The aorta root is normal. The thoracic aorta is normal. The inferior vena cava  was normal in size with preserved respiratory variability.     Pericardium  No pericardial effusion is present. Prominent epicardial fat is noted.     Compared to Previous Study  This study was compared with the study from 9/7/2023 .    [facility]io 6/6/23:       CTA Structure Heart 11/10/2022:  1.  There is a Watchman 24 mm closure device in the left atrial  appendage. It is well-seated.   2.  There is no thrombus on the left atrial side of the closure device  on early or delayed imaging.   3.  There is no contrast opacification of the left atrial appendage,  implying complete endothelialization of the occlusion device.   4.  Normal pulmonary venous anatomy with all pulmonary veins draining  into the left atrium.  5.  Coronary images are non-diagnostic for stenosis/atherosclerosis  due to cardiac motion artifact.   6.  The visualized aortic root, ascending aorta and descending  thoracic aorta are normal.  7.    Please review the separate Radiology report from the original study  site and date for incidental noncardiac findings. This report will  follow separately.     Physical Examination   LMP  (LMP Unknown)   Wt Readings from Last 3 Encounters:   05/13/25 83.6 kg (184 lb 3.2 oz)   03/12/25 82.9 kg (182 lb 11.2 oz)   01/23/25 81.6 kg (180 lb)     General Appearance:   Alert, well-appearing and in no acute distress.   HEENT: Atraumatic, normocephalic. PERRL.  MMM.   Chest/Lungs:   Respirations unlabored.  Lungs are clear to auscultation.   Cardiovascular:   Regular rate and rhythm.  S1/S2. No murmur. No carotid bruits on auscultation    Abdomen:  Soft, nontender, nondistended.   Extremities: No cyanosis or clubbing. No edema.    Musculoskeletal: Moves all extremities.     Skin: Warm, dry, intact.    Neurologic: Mood and affect are appropriate.  Alert and oriented to person, place, time, and situation.          Medications  Allergies   Current Outpatient Medications   Medication Sig Dispense Refill     acyclovir (ZOVIRAX) 400 MG tablet Take 1 tablet (400 mg) by mouth every 8 hours for 7 days. For herpes outbreak 21 tablet 0     acyclovir (ZOVIRAX) 5 % external ointment Apply topically as needed (6 times day PRN for outbreaks). As needed for outbreaks 15 g 2     albuterol (PROAIR HFA/PROVENTIL HFA/VENTOLIN HFA) 108 (90 Base) MCG/ACT inhaler Inhale 2 puffs into the lungs every 6 hours as needed for wheezing or shortness of breath       allopurinol (ZYLOPRIM) 100 MG tablet TAKE 1 TABLET BY MOUTH EVERY DAY 90 tablet 1     anakinra (KINERET) 100 MG/0.67ML SOSY injection Inject 0.67 mLs (100 mg) subcutaneously daily. 20.1 mL 3     aspirin (ASA) 81 MG EC tablet Take 1 tablet (81 mg) by mouth 2 times daily 60 tablet 0     atorvastatin (LIPITOR) 10 MG tablet Take 0.5 tablets (5 mg) by mouth daily. 45 tablet 1     azithromycin (ZITHROMAX) 250 MG tablet TAKE 1 TABLET BY MOUTH EVERY DAY 30 tablet 11     blood glucose (NO BRAND SPECIFIED) lancets standard Use to test blood sugar 3 times daily or as directed 100 Lancet 3     BREO ELLIPTA 100-25 MCG/ACT inhaler TAKE 1 PUFF BY MOUTH EVERY DAY 1 each 11     cevimeline (EVOXAC) 30 MG capsule Take 1 capsule (30 mg) by mouth 3 times daily as needed 270 capsule 3     Cholecalciferol (VITAMIN D3) 50 MCG (2000 UT) CAPS TAKE 100 MCG BY MOUTH DAILY (TAKE 2 TABLET (50 MCG) BY MOUTH DAILY - ORAL) (Patient taking differently: Take 1,000 Units by mouth daily. (Take 2 tablet (2000 units by mouth daily - Oral)) 180 capsule 3     COMPOUNDED NON-CONTROLLED SUBSTANCE (CMPD RX) - PHARMACY TO MIX COMPOUNDED MEDICATION Estriol 1 mg/g  Apply small amount to finger and apply to inside vagina daily for 2 weeks then twice weekly Route: vaginally Dispense 30 grams 11 refills 30 g 11     CONTOUR NEXT TEST test strip USE TO TEST BLOOD SUGARS 3 TIMES DAILY 300 strip 1     cyanocobalamin (VITAMIN B-12) 1000 MCG tablet Take 1 tablet (1,000 mcg) by mouth three times a week       empagliflozin (JARDIANCE) 10 MG TABS tablet Take 1 tablet (10 mg) by mouth daily. 90 tablet 1     escitalopram (LEXAPRO) 20 MG tablet TAKE 1 TABLET BY MOUTH EVERY DAY 30 tablet 0     fluticasone (FLONASE) 50 MCG/ACT nasal spray SPRAY 1-2 SPRAYS INTO BOTH NOSTRILS DAILY AS NEEDED FOR ALLERGIES 16 mL 11     hydroxychloroquine (PLAQUENIL) 200 MG tablet TAKE 1 TABLET (200 MG) BY MOUTH DAILY GET ANNUAL EYE EXAMS FOR MONITORING AND FAX [phone] 90 tablet 3     insulin glargine (LANTUS SOLOSTAR) 100 UNIT/ML pen Inject 24 Units subcutaneously at bedtime. 30 mL 0     insulin lispro (HUMALOG KWIKPEN) 100 UNIT/ML (1 unit dial) KWIKPEN Sliding scale insulin; tests BG three times day If BG <150, no insulin given If -200 take 8 units If -250 take 10 units If -300 take 12 units If -350 take 16 units If BG >350 take 20 units 15 mL 1     insulin pen needle (BD PEN NEEDLE JANE 2ND GEN) 32G X 4 MM miscellaneous USE TO TEST 4 TIMES A  each 1     ketoconazole (NIZORAL) 2 % external cream Apply topically 2 times daily as needed for itching 60 g 1     levocetirizine (XYZAL) 5 MG tablet TAKE 1 TABLET BY MOUTH EVERY DAY IN THE EVENING 90 tablet 1     lidocaine (LIDODERM) 5 % patch APPLY PATCH TO PAINFUL AREA FOR UP TO 12 H WITHIN A 24 H PERIOD. REMOVE AFTER 12 HOURS. 30 patch 2     loratadine (CLARITIN) 10 MG tablet TAKE 1 TABLET BY MOUTH EVERY DAY 90 tablet 2     methocarbamol (ROBAXIN) 750 MG tablet Take 1 tablet (750 mg) by mouth 3 times daily as needed for muscle spasms 90 tablet 3     metoprolol succinate ER (TOPROL XL) 50 MG 24 hr tablet Take 0.5 tablets (25 mg)  by mouth every morning AND 1 tablet (50 mg) every evening. 135 tablet 3     mirabegron (MYRBETRIQ) 25 MG 24 hr tablet Take 1 tablet (25 mg) by mouth daily. 30 tablet 2     mirabegron (MYRBETRIQ) 25 MG 24 hr tablet Take 1 tablet (25 mg) by mouth daily. For additional refills, please schedule a follow-up appointment. 30 tablet 1     montelukast (SINGULAIR) 10 MG tablet TAKE 1 TABLET BY MOUTH EVERYDAY AT BEDTIME 90 tablet 3     MOUNJARO 15 MG/0.5ML SOAJ Inject 0.5 mLs (15 mg) subcutaneously every 7 days. 6 mL 3     oxyCODONE-acetaminophen (PERCOCET) 5-325 MG tablet Take 1 tablet by mouth every 8 hours as needed for pain. 60 tablet 0     pantoprazole (PROTONIX) 40 MG EC tablet TAKE 1 TABLET (40 MG) BY MOUTH DAILY (REPLACES FAMOTIDINE- SHOULD STOP TAKING FAMOTIDINE) 90 tablet 1     polyethylene glycol (MIRALAX) 17 g packet Take 17 g by mouth daily 10 packet 0     potassium chloride ashwini ER (KLOR-CON M20) 20 MEQ CR tablet TAKE 2 TABLETS (40 MEQ) BY MOUTH EVERY MORNING AND 1 TABLET (20 MEQ) EVERY EVENING. 270 tablet 3     predniSONE (DELTASONE) 10 MG tablet TAKE 40mg daily for 3 days, then 30mg daily for 3 days, then 20mg daily for 3 days then 10mg daily for 3 days AS NEEDED FOR GOUT FLARES, THEN DROP BACK TO BASELINE 5 MG EVERY DAY. 30 tablet 3     predniSONE (DELTASONE) 5 MG tablet Take 1 tablet (5 mg) by mouth daily. 90 tablet 3     predniSONE (DELTASONE) 5 MG tablet Take 1 tablet (5 mg) by mouth daily. 90 tablet 1     torsemide (DEMADEX) 20 MG tablet Take 2 tablets (40 mg) by mouth every morning AND 1 tablet (20 mg) daily at 2 pm. 270 tablet 3     traZODone (DESYREL) 50 MG tablet Take 1-2 tablets ( mg) by mouth at bedtime. 90 tablet 0    Allergies   Allergen Reactions     Amoxicillin-Pot Clavulanate Nausea and Vomiting     Amoxicillin-Pot Clavulanate      Codeine Nausea and Vomiting     PN: LW Reaction: HIVES     Penicillins Nausea and Vomiting     PN: LW Reaction: GI Upset     Phenobarbital Itching     Seasonal  Allergies          Lab Results (Personally Reviewed)    Chemistry/lipid CBC Cardiac Enzymes/BNP/TSH/INR   Lab Results   Component Value Date    BUN 16.7 03/12/2025     03/12/2025    CO2 26 03/12/2025     Creatinine   Date Value Ref Range Status   03/12/2025 1.07 (H) 0.51 - 0.95 mg/dL Final   06/04/2021 1.11 (H) 0.52 - 1.04 mg/dL Final       Lab Results   Component Value Date    CHOL 123 03/12/2025    HDL 72 03/12/2025    LDL 33 03/12/2025      Lab Results   Component Value Date    WBC 8.3 03/12/2025    HGB 13.2 03/12/2025    HCT 39.8 03/12/2025    MCV 91 03/12/2025     03/12/2025    Lab Results   Component Value Date    TSH 4.10 03/12/2025    INR 1.43 (H) 11/10/2022        The patient states understanding and is agreeable with the plan.   Aruna Tucker PA-C  Electrophysiology Consult Service  Securely message with [employer]   Text page via [facility] Paging/Directory           Total time spent on patient visit, reviewing notes, imaging, labs, orders, and completing necessary documentation: 30 minutes.       Please do not hesitate to contact me if you have any questions/concerns.     Sincerely,     Aruna Tucker PA-C

## 2025-05-15 NOTE — TELEPHONE ENCOUNTER
General Call      Reason for Call:  medication information     What are your questions or concerns:  Betty was told to call you and let you know when she started her medication and she started the medication today 5/15. She is requesting that you call her on 5/19 or 5/20 instead.     Date of last appointment with provider: 5/13/25    Could we send this information to you in ADTZ or would you prefer to receive a phone call?:   Patient would prefer a phone call   Okay to leave a detailed message?: Yes at Cell number on file:    Telephone Information:   Mobile [phone]

## 2025-05-19 NOTE — TELEPHONE ENCOUNTER
Agree with Lev - I think this message is for me. I'll give her a call tomorrow (as planned).     Sabrina Meek, PharmD, JAMES, BCACP  MTM Pharmacist, [facility]

## 2025-05-20 ENCOUNTER — TELEPHONE (OUTPATIENT)
Dept: PHARMACY | Facility: CLINIC | Age: 85
End: 2025-05-20
Payer: COMMERCIAL

## 2025-05-20 NOTE — TELEPHONE ENCOUNTER
Called pt to follow-up on Jardiance start - no side effects, so far feels no different. Doesn't feel like anything has changed with her blood sugars either.     No change - continue Jardiance 10mg daily. Labs tomorrow as planned - will follow-up when they return.     Sabrina Meek, MarcosD, JAMES, BCACP  MTM Pharmacist, [facility]

## 2025-05-21 ENCOUNTER — LAB (OUTPATIENT)
Dept: LAB | Facility: CLINIC | Age: 85
End: 2025-05-21
Payer: COMMERCIAL

## 2025-05-21 ENCOUNTER — OFFICE VISIT (OUTPATIENT)
Dept: UROLOGY | Facility: CLINIC | Age: 85
End: 2025-05-21
Payer: COMMERCIAL

## 2025-05-21 VITALS
SYSTOLIC BLOOD PRESSURE: 133 MMHG | BODY MASS INDEX: 30.82 KG/M2 | OXYGEN SATURATION: 94 % | WEIGHT: 185 LBS | HEART RATE: 92 BPM | HEIGHT: 65 IN | DIASTOLIC BLOOD PRESSURE: 64 MMHG

## 2025-05-21 DIAGNOSIS — R39.15 URGENCY OF URINATION: ICD-10-CM

## 2025-05-21 DIAGNOSIS — N39.41 URGE INCONTINENCE: ICD-10-CM

## 2025-05-21 DIAGNOSIS — E11.65 TYPE 2 DIABETES MELLITUS WITH HYPERGLYCEMIA, WITH LONG-TERM CURRENT USE OF INSULIN (H): ICD-10-CM

## 2025-05-21 DIAGNOSIS — Z79.4 TYPE 2 DIABETES MELLITUS WITH HYPERGLYCEMIA, WITH LONG-TERM CURRENT USE OF INSULIN (H): ICD-10-CM

## 2025-05-21 DIAGNOSIS — N95.8 GENITOURINARY SYNDROME OF MENOPAUSE: Primary | ICD-10-CM

## 2025-05-21 LAB
ANION GAP SERPL CALCULATED.3IONS-SCNC: 12 MMOL/L (ref 7–15)
BUN SERPL-MCNC: 15.8 MG/DL (ref 8–23)
CALCIUM SERPL-MCNC: 9.8 MG/DL (ref 8.8–10.4)
CHLORIDE SERPL-SCNC: 102 MMOL/L (ref 98–107)
CREAT SERPL-MCNC: 1.15 MG/DL (ref 0.51–0.95)
EGFRCR SERPLBLD CKD-EPI 2021: 46 ML/MIN/1.73M2
GLUCOSE SERPL-MCNC: 245 MG/DL (ref 70–99)
HCO3 SERPL-SCNC: 28 MMOL/L (ref 22–29)
POTASSIUM SERPL-SCNC: 4.1 MMOL/L (ref 3.4–5.3)
SODIUM SERPL-SCNC: 142 MMOL/L (ref 135–145)

## 2025-05-21 PROCEDURE — 36415 COLL VENOUS BLD VENIPUNCTURE: CPT | Performed by: PATHOLOGY

## 2025-05-21 PROCEDURE — 80048 BASIC METABOLIC PNL TOTAL CA: CPT | Performed by: PATHOLOGY

## 2025-05-21 RX ORDER — METOPROLOL SUCCINATE 25 MG/1
25 TABLET, EXTENDED RELEASE ORAL DAILY
COMMUNITY

## 2025-05-21 RX ORDER — ESTRADIOL 10 UG/1
10 TABLET, FILM COATED VAGINAL
Qty: 24 TABLET | Refills: 3 | Status: SHIPPED | OUTPATIENT
Start: 2025-05-22

## 2025-05-21 RX ORDER — MIRABEGRON 25 MG/1
25 TABLET, FILM COATED, EXTENDED RELEASE ORAL DAILY
Qty: 90 TABLET | Refills: 3 | Status: SHIPPED | OUTPATIENT
Start: 2025-05-21

## 2025-05-21 ASSESSMENT — PAIN SCALES - GENERAL: PAINLEVEL_OUTOF10: NO PAIN (0)

## 2025-05-21 NOTE — PATIENT INSTRUCTIONS
-continue Myrbetriq 25 mg (pt. Will monitor her bp)  -continue vaginal estrogen for GUSM, will switch to vagifem  -follow up in a year, sooner if she would like to consider further intervention.

## 2025-05-21 NOTE — NURSING NOTE
"Betty Tee is a 85 year old female patient that presents today in clinic for the following:    Chief Complaint   Patient presents with    Follow Up     Review Mediations        The patient's allergies and medications were reviewed as noted. A set of vitals were recorded as noted without incident. The patient does not have any other questions for the provider.    Blood pressure 133/64, pulse 92, height 1.651 m (5' 5\"), weight 83.9 kg (185 lb), SpO2 94%, not currently breastfeeding. Body mass index is 30.79 kg/m .    Patient Active Problem List   Diagnosis    Temporomandibular joint disorder    Diverticulitis of colon    Alcohol abuse, in remission    Restless legs syndrome (RLS)    Major depressive disorder, recurrent episode, moderate (H)    Essential hypertension with goal blood pressure less than 140/90    Colouterine fistula    Paroxysmal atrial fibrillation (H)    Left ventricular hypertrophy    Insomnia    Breast fibroadenoma    History of deep vein thrombosis (DVT) of lower extremity    Fatty liver disease, nonalcoholic    Rib pain    Gastroesophageal reflux disease without esophagitis    Enlarged lymph node    Sjogren's syndrome with lung involvement (H)    ANGELICA (obstructive sleep apnea)    ILD (interstitial lung disease) (H)    Lower GI bleed    (HFpEF) heart failure with preserved ejection fraction (H)    Type 2 diabetes mellitus with hyperglycemia, with long-term current use of insulin (H)    Hyperlipidemia LDL goal <100    Inflammatory arthritis    Follicular bronchiolitis (H)    Stage 3a chronic kidney disease (H)    Urge incontinence of urine    Osteoarthritis of right hip    Seasonal allergic rhinitis due to pollen    Elevated parathyroid hormone    Persistent atrial fibrillation (H)    Vitamin D deficiency    GAVE (gastric antral vascular ectasia)    Iron deficiency anemia, unspecified iron deficiency anemia type    Gout, unspecified cause, unspecified chronicity, unspecified site    " Gastroenteritis    Lactic acidosis    Recurrent cold sores    Seronegative rheumatoid arthritis (H)    Polyneuropathy associated with underlying disease    Age-related osteoporosis without current pathological fracture       Allergies   Allergen Reactions    Amoxicillin-Pot Clavulanate Nausea and Vomiting    Amoxicillin-Pot Clavulanate     Codeine Nausea and Vomiting     PN: LW Reaction: HIVES    Penicillins Nausea and Vomiting     PN: LW Reaction: GI Upset    Phenobarbital Itching    Seasonal Allergies        Current Outpatient Medications   Medication Sig Dispense Refill    acyclovir (ZOVIRAX) 400 MG tablet Take 1 tablet (400 mg) by mouth every 8 hours for 7 days. For herpes outbreak 21 tablet 0    acyclovir (ZOVIRAX) 5 % external ointment Apply topically as needed (6 times day PRN for outbreaks). As needed for outbreaks 15 g 2    albuterol (PROAIR HFA/PROVENTIL HFA/VENTOLIN HFA) 108 (90 Base) MCG/ACT inhaler Inhale 2 puffs into the lungs every 6 hours as needed for wheezing or shortness of breath      allopurinol (ZYLOPRIM) 100 MG tablet TAKE 1 TABLET BY MOUTH EVERY DAY 90 tablet 1    anakinra (KINERET) 100 MG/0.67ML SOSY injection Inject 0.67 mLs (100 mg) subcutaneously daily. 20.1 mL 3    aspirin (ASA) 81 MG EC tablet Take 1 tablet (81 mg) by mouth 2 times daily 60 tablet 0    atorvastatin (LIPITOR) 10 MG tablet Take 0.5 tablets (5 mg) by mouth daily. 45 tablet 1    azithromycin (ZITHROMAX) 250 MG tablet TAKE 1 TABLET BY MOUTH EVERY DAY 30 tablet 11    blood glucose (NO BRAND SPECIFIED) lancets standard Use to test blood sugar 3 times daily or as directed 100 Lancet 3    BREO ELLIPTA 100-25 MCG/ACT inhaler TAKE 1 PUFF BY MOUTH EVERY DAY 1 each 11    cevimeline (EVOXAC) 30 MG capsule Take 1 capsule (30 mg) by mouth 3 times daily as needed 270 capsule 3    Cholecalciferol (VITAMIN D3) 50 MCG (2000 UT) CAPS TAKE 100 MCG BY MOUTH DAILY (TAKE 2 TABLET (50 MCG) BY MOUTH DAILY - ORAL) (Patient taking differently: Take  1,000 Units by mouth daily. (Take 2 tablet (2000 units by mouth daily - Oral)) 180 capsule 3    COMPOUNDED NON-CONTROLLED SUBSTANCE (CMPD RX) - PHARMACY TO MIX COMPOUNDED MEDICATION Estriol 1 mg/g Apply small amount to finger and apply to inside vagina daily for 2 weeks then twice weekly Route: vaginally Dispense 30 grams 11 refills 30 g 11    CONTOUR NEXT TEST test strip USE TO TEST BLOOD SUGARS 3 TIMES DAILY 300 strip 1    cyanocobalamin (VITAMIN B-12) 1000 MCG tablet Take 1 tablet (1,000 mcg) by mouth three times a week      empagliflozin (JARDIANCE) 10 MG TABS tablet Take 1 tablet (10 mg) by mouth daily. 90 tablet 1    escitalopram (LEXAPRO) 20 MG tablet TAKE 1 TABLET BY MOUTH EVERY DAY 30 tablet 0    fluticasone (FLONASE) 50 MCG/ACT nasal spray SPRAY 1-2 SPRAYS INTO BOTH NOSTRILS DAILY AS NEEDED FOR ALLERGIES 16 mL 11    hydroxychloroquine (PLAQUENIL) 200 MG tablet TAKE 1 TABLET (200 MG) BY MOUTH DAILY GET ANNUAL EYE EXAMS FOR MONITORING AND FAX [phone] 90 tablet 3    insulin glargine (LANTUS SOLOSTAR) 100 UNIT/ML pen Inject 24 Units subcutaneously at bedtime. 30 mL 0    insulin lispro (HUMALOG KWIKPEN) 100 UNIT/ML (1 unit dial) KWIKPEN Sliding scale insulin; tests BG three times day If BG <150, no insulin given If -200 take 8 units If -250 take 10 units If -300 take 12 units If -350 take 16 units If BG >350 take 20 units 15 mL 1    insulin pen needle (BD PEN NEEDLE JANE 2ND GEN) 32G X 4 MM miscellaneous USE TO TEST 4 TIMES A  each 1    ketoconazole (NIZORAL) 2 % external cream Apply topically 2 times daily as needed for itching 60 g 1    levocetirizine (XYZAL) 5 MG tablet TAKE 1 TABLET BY MOUTH EVERY DAY IN THE EVENING 90 tablet 1    lidocaine (LIDODERM) 5 % patch APPLY PATCH TO PAINFUL AREA FOR UP TO 12 H WITHIN A 24 H PERIOD. REMOVE AFTER 12 HOURS. 30 patch 2    loratadine (CLARITIN) 10 MG tablet TAKE 1 TABLET BY MOUTH EVERY DAY 90 tablet 2    methocarbamol (ROBAXIN)  750 MG tablet Take 1 tablet (750 mg) by mouth 3 times daily as needed for muscle spasms 90 tablet 3    metoprolol succinate ER (TOPROL XL) 25 MG 24 hr tablet Take 1 tablet (25 mg) by mouth 2 times daily. 180 tablet 4    mirabegron (MYRBETRIQ) 25 MG 24 hr tablet Take 1 tablet (25 mg) by mouth daily. 30 tablet 2    mirabegron (MYRBETRIQ) 25 MG 24 hr tablet Take 1 tablet (25 mg) by mouth daily. For additional refills, please schedule a follow-up appointment. 30 tablet 1    montelukast (SINGULAIR) 10 MG tablet TAKE 1 TABLET BY MOUTH EVERYDAY AT BEDTIME 90 tablet 3    MOUNJARO 15 MG/0.5ML SOAJ Inject 0.5 mLs (15 mg) subcutaneously every 7 days. 6 mL 3    oxyCODONE-acetaminophen (PERCOCET) 5-325 MG tablet Take 1 tablet by mouth every 8 hours as needed for pain. 60 tablet 0    pantoprazole (PROTONIX) 40 MG EC tablet TAKE 1 TABLET (40 MG) BY MOUTH DAILY (REPLACES FAMOTIDINE- SHOULD STOP TAKING FAMOTIDINE) 90 tablet 1    polyethylene glycol (MIRALAX) 17 g packet Take 17 g by mouth daily 10 packet 0    potassium chloride ashwini ER (KLOR-CON M20) 20 MEQ CR tablet TAKE 2 TABLETS (40 MEQ) BY MOUTH EVERY MORNING AND 1 TABLET (20 MEQ) EVERY EVENING. 270 tablet 3    predniSONE (DELTASONE) 10 MG tablet TAKE 40mg daily for 3 days, then 30mg daily for 3 days, then 20mg daily for 3 days then 10mg daily for 3 days AS NEEDED FOR GOUT FLARES, THEN DROP BACK TO BASELINE 5 MG EVERY DAY. 30 tablet 3    predniSONE (DELTASONE) 5 MG tablet Take 1 tablet (5 mg) by mouth daily. 90 tablet 3    predniSONE (DELTASONE) 5 MG tablet Take 1 tablet (5 mg) by mouth daily. 90 tablet 1    torsemide (DEMADEX) 20 MG tablet Take 2 tablets (40 mg) by mouth every morning AND 1 tablet (20 mg) daily at 2 pm. 270 tablet 3    traZODone (DESYREL) 50 MG tablet Take 1-2 tablets ( mg) by mouth nightly as needed for sleep. 90 tablet 0       Social History     Tobacco Use    Smoking status: Former     Current packs/day: 0.00     Average packs/day: 0.5 packs/day for  41.6 years (20.8 ttl pk-yrs)     Types: Cigarettes     Start date:      Quit date: 2011     Years since quittin.8     Passive exposure: Never    Smokeless tobacco: Never    Tobacco comments:     1/2 ppd   Vaping Use    Vaping status: Never Used   Substance Use Topics    Alcohol use: No     Alcohol/week: 0.0 standard drinks of alcohol     Comment: In recovery beginning     Drug use: No       Lev Harris, EMT-P   2025  11:52 AM

## 2025-05-21 NOTE — PROGRESS NOTES
Reason for Visit:  Follow up urinary urgency/incontinence    Clinical Data:  Ms. Tee is a 85 year old female with urinary urgency and incontinence. She was started on estrogen cream and has been using that.  She has not tried any medication because she was trying more conservative measures.  She feels that the Myrbetriq is helping and has had less incontinence when she gets up from a sitting position she starts to leak as well.  She is pleased with this control for now except when she doesn't use the estrogen cream regularly.    Assessment & Plan: Betty Tee is a 85 year old female with a history of urinary urgency and urge incontinence as well. Her symptoms are improved with the myrbetriq but sometimes has a little difficulty starting her stream.  We discussed periurethral bulking for some ISD, but hesitant to do that if any difficulty.  She is pleased with her symptoms for now she would however prefer the estrogen tablet instead of cream.   -continue Myrbetriq 25 mg (pt. Will monitor her bp)  -continue vaginal estrogen for GUSM, will switch to vagifem  -follow up in a year, sooner if she would like to consider further intervention.      Thank you for allowing me to participate in the care of  Ms. Betty Tee and I will keep you updated on her progress.    Vale Nassar MD

## 2025-05-21 NOTE — LETTER
5/21/2025       RE: Betty Tee  [address]  [place] [address]     Dear Colleague,    Thank you for referring your patient, Betty Tee, to the [facility] [place] at [facility]. Please see a copy of my visit note below.    Reason for Visit:  Follow up urinary urgency/incontinence    Clinical Data:  Ms. Tee is a 85 year old female with urinary urgency and incontinence. She was started on estrogen cream and has been using that.  She has not tried any medication because she was trying more conservative measures.  She feels that the Myrbetriq is helping and has had less incontinence when she gets up from a sitting position she starts to leak as well.  She is pleased with this control for now except when she doesn't use the estrogen cream regularly.    Assessment & Plan: Betty Tee is a 85 year old female with a history of urinary urgency and urge incontinence as well. Her symptoms are improved with the myrbetriq but sometimes has a little difficulty starting her stream.  We discussed periurethral bulking for some ISD, but hesitant to do that if any difficulty.  She is pleased with her symptoms for now she would however prefer the estrogen tablet instead of cream.   -continue Myrbetriq 25 mg (pt. Will monitor her bp)  -continue vaginal estrogen for GUSM, will switch to vagifem  -follow up in a year, sooner if she would like to consider further intervention.      Thank you for allowing me to participate in the care of  Ms. Betty Tee and I will keep you updated on her progress.    Vale Nassar MD       Again, thank you for allowing me to participate in the care of your patient.      Sincerely,    Vale Nassar MD

## 2025-05-22 ENCOUNTER — RESULTS FOLLOW-UP (OUTPATIENT)
Dept: PHARMACY | Facility: CLINIC | Age: 85
End: 2025-05-22

## 2025-05-22 DIAGNOSIS — Z79.4 TYPE 2 DIABETES MELLITUS WITH HYPERGLYCEMIA, WITH LONG-TERM CURRENT USE OF INSULIN (H): ICD-10-CM

## 2025-05-22 DIAGNOSIS — N18.31 STAGE 3A CHRONIC KIDNEY DISEASE (H): Primary | ICD-10-CM

## 2025-05-22 DIAGNOSIS — E11.65 TYPE 2 DIABETES MELLITUS WITH HYPERGLYCEMIA, WITH LONG-TERM CURRENT USE OF INSULIN (H): ICD-10-CM

## 2025-05-22 NOTE — TELEPHONE ENCOUNTER
Called pt regarding these results as well as sent [employer] message. She has seen some reduction in her fasting glucose values the last few days (in the 140-160 range). MTM to follow-up by phone in 1-2 weeks for insulin adjustment as needed. Labs ordered for lab draw on 6/25.   Sabrina Meek PharmD, JAMES, BCACP  [facility] Pharmacist, [facility]           Owing to their renal mechanism of action, patients who initiate an SGLT2 inhibitor may experience a decline in eGFR shortly after beginning treatment,44 similar to that observed in some patients after initiating SUNSHINE inhibitors.45    An initial decline in eGFR is to be expected and should not be considered a cause for concern. A post hoc analysis of the EMPA-REG OUTCOME trial results found that an initial eGFR decline from baseline of>10% occurred more often in patients treated with empagliflozin than in those receiving placebo (28.3% vs 13.4% of patients). This eGFR decline did not modify the beneficial cardiovascular or kidney effects of empagliflozin, and mean eGFR remained stable from week 12 onward.44 A post hoc analysis of the CREDENCE trial results also found that although a transient eGFR decline (>10% from baseline) occurred more often in the canagliflozin arm than in the placebo arm (44.6% vs 21.0% of patients), the clinical benefit of canagliflozin was still observed.46 Similarly, a prespecified analysis of the DAPA-CKD trial showed that an eGFR decline (>10% from baseline) 2 weeks after initiation of dapagliflozin was a transient event and was not associated with CKD progression or changes in clinical benefits or outcomes.47 Monitoring of eGFR after SGLT2 inhibitor initiation is not necessary. However, Kidney Disease: Improving Global Outcomes (KDIGO) guidelines recommend that a significant reduction of>30% in eGFR following initiation of an SGLT2 inhibitor warrants further investigation and monitoring.26 An initial eGFR decline alone should not  lead to withdrawal of SGLT2 inhibitors because these effects are typically reversible and not indicative of drug toxicity.48  https://www.kidneymedicinejournal.org/article/-0608-3869(11)17397-1/fulltext

## 2025-05-25 DIAGNOSIS — Z79.4 TYPE 2 DIABETES MELLITUS WITH HYPERGLYCEMIA, WITH LONG-TERM CURRENT USE OF INSULIN (H): ICD-10-CM

## 2025-05-25 DIAGNOSIS — E11.65 TYPE 2 DIABETES MELLITUS WITH HYPERGLYCEMIA, WITH LONG-TERM CURRENT USE OF INSULIN (H): ICD-10-CM

## 2025-05-27 RX ORDER — TIRZEPATIDE 7.5 MG/.5ML
INJECTION, SOLUTION SUBCUTANEOUS
OUTPATIENT
Start: 2025-05-27

## 2025-05-27 RX ORDER — TIRZEPATIDE 12.5 MG/.5ML
12.5 INJECTION, SOLUTION SUBCUTANEOUS
OUTPATIENT
Start: 2025-05-27

## 2025-05-27 RX ORDER — TIRZEPATIDE 15 MG/.5ML
15 INJECTION, SOLUTION SUBCUTANEOUS
Qty: 6 ML | Refills: 3 | Status: SHIPPED | OUTPATIENT
Start: 2025-05-27

## 2025-05-27 NOTE — TELEPHONE ENCOUNTER
Clinic RN: Please investigate patient's chart or contact patient if the information cannot be found because the last prescription was a taper dose (not in RN protocol). We need to know what dose patient is taking. Determine the current dose, then route to provider and ask provider to update the SIG and approve or deny the prescription.    Thanks   JASON Herr

## 2025-05-27 NOTE — TELEPHONE ENCOUNTER
Incorrect doses pended.   Now pended 15mg Mounjaro  Routing refill request to PCP to address    Yvonne GARCIA RN  [facility] Triage

## 2025-05-29 ENCOUNTER — TELEPHONE (OUTPATIENT)
Dept: CARDIOLOGY | Facility: CLINIC | Age: 85
End: 2025-05-29
Payer: COMMERCIAL

## 2025-05-29 NOTE — TELEPHONE ENCOUNTER
Called and left a message with the patient stating that I was calling to see if her dizziness had improved since decreasing her dose of Toprol to 25 BID.  I left the clinic phone number for her to call back and report her symptoms.    NORMA AmezquitaN RN  Electrophysiology Nurse Coordinator

## 2025-06-02 ENCOUNTER — TELEPHONE (OUTPATIENT)
Dept: RHEUMATOLOGY | Facility: CLINIC | Age: 85
End: 2025-06-02
Payer: COMMERCIAL

## 2025-06-02 DIAGNOSIS — F33.1 MAJOR DEPRESSIVE DISORDER, RECURRENT EPISODE, MODERATE (H): ICD-10-CM

## 2025-06-02 RX ORDER — ESCITALOPRAM OXALATE 20 MG/1
20 TABLET ORAL DAILY
Qty: 90 TABLET | Refills: 1 | Status: SHIPPED | OUTPATIENT
Start: 2025-06-02

## 2025-06-02 NOTE — TELEPHONE ENCOUNTER
Sent Mychart (1st Attempt) and Patient Contacted for the patient to call back and schedule the following:    Appointment type: Return Rheumatology  Provider: Dr. Lopez  Return date: Next available  Specialty phone number: [phone]  Additional appointment(s) needed: NA  Additonal Notes: Reschedule 8/8 appt to next available and add to wait list

## 2025-06-06 ENCOUNTER — MYC MEDICAL ADVICE (OUTPATIENT)
Dept: UROLOGY | Facility: CLINIC | Age: 85
End: 2025-06-06
Payer: COMMERCIAL

## 2025-06-08 DIAGNOSIS — Z79.4 TYPE 2 DIABETES MELLITUS WITH HYPERGLYCEMIA, WITH LONG-TERM CURRENT USE OF INSULIN (H): ICD-10-CM

## 2025-06-08 DIAGNOSIS — G47.00 INSOMNIA, UNSPECIFIED TYPE: ICD-10-CM

## 2025-06-08 DIAGNOSIS — E11.65 TYPE 2 DIABETES MELLITUS WITH HYPERGLYCEMIA, WITH LONG-TERM CURRENT USE OF INSULIN (H): ICD-10-CM

## 2025-06-08 DIAGNOSIS — J44.89 FOLLICULAR BRONCHIOLITIS (H): ICD-10-CM

## 2025-06-09 RX ORDER — TRAZODONE HYDROCHLORIDE 50 MG/1
TABLET ORAL
Qty: 90 TABLET | Refills: 0 | OUTPATIENT
Start: 2025-06-09

## 2025-06-09 RX ORDER — AZITHROMYCIN 250 MG/1
250 TABLET, FILM COATED ORAL
Qty: 30 TABLET | Refills: 4 | Status: SHIPPED | OUTPATIENT
Start: 2025-06-09

## 2025-06-09 RX ORDER — TIRZEPATIDE 12.5 MG/.5ML
12.5 INJECTION, SOLUTION SUBCUTANEOUS
OUTPATIENT
Start: 2025-06-09

## 2025-06-14 ENCOUNTER — HEALTH MAINTENANCE LETTER (OUTPATIENT)
Age: 85
End: 2025-06-14

## 2025-06-17 DIAGNOSIS — I50.30 HEART FAILURE WITH PRESERVED EJECTION FRACTION, UNSPECIFIED HF CHRONICITY (H): Primary | ICD-10-CM

## 2025-06-22 DIAGNOSIS — Z79.4 TYPE 2 DIABETES MELLITUS WITH HYPERGLYCEMIA, WITH LONG-TERM CURRENT USE OF INSULIN (H): ICD-10-CM

## 2025-06-22 DIAGNOSIS — E11.65 TYPE 2 DIABETES MELLITUS WITH HYPERGLYCEMIA, WITH LONG-TERM CURRENT USE OF INSULIN (H): ICD-10-CM

## 2025-06-23 DIAGNOSIS — E11.65 TYPE 2 DIABETES MELLITUS WITH HYPERGLYCEMIA, WITH LONG-TERM CURRENT USE OF INSULIN (H): ICD-10-CM

## 2025-06-23 DIAGNOSIS — Z79.4 TYPE 2 DIABETES MELLITUS WITH HYPERGLYCEMIA, WITH LONG-TERM CURRENT USE OF INSULIN (H): ICD-10-CM

## 2025-06-23 RX ORDER — INSULIN GLARGINE 100 [IU]/ML
24 INJECTION, SOLUTION SUBCUTANEOUS AT BEDTIME
Qty: 30 ML | Refills: 0 | Status: SHIPPED | OUTPATIENT
Start: 2025-06-23

## 2025-06-23 NOTE — TELEPHONE ENCOUNTER
If you are a smoker, it is important for your health to stop smoking. Please be aware that second hand smoke is also harmful.
[facility] Call Center    Phone Message    May a detailed message be left on voicemail: yes     Reason for Call: Symptoms or Concerns     If patient has red-flag symptoms, warm transfer to triage line    Current symptom or concern: Flare in arm and hand not improved after completing Prednisone Taper on Saturday 7/21/25 and doing a self injection for pain at home, Please advise.     Symptoms have been present for:  2-3 week(s)    Has patient previously been seen for this? Yes    Action Taken: Other: Rheum     Travel Screening: Not Applicable     Date of Service:                                                                     
no

## 2025-06-23 NOTE — PROGRESS NOTES
See documentation on encounter dated 6/6/25 for further details.     Tamara VIGIL RN  Adult Rheumatology Clinic

## 2025-06-24 RX ORDER — TIRZEPATIDE 7.5 MG/.5ML
INJECTION, SOLUTION SUBCUTANEOUS
OUTPATIENT
Start: 2025-06-24

## 2025-06-25 ENCOUNTER — OFFICE VISIT (OUTPATIENT)
Dept: RHEUMATOLOGY | Facility: CLINIC | Age: 85
End: 2025-06-25
Attending: INTERNAL MEDICINE
Payer: COMMERCIAL

## 2025-06-25 ENCOUNTER — RESULTS FOLLOW-UP (OUTPATIENT)
Dept: CARDIOLOGY | Facility: CLINIC | Age: 85
End: 2025-06-25

## 2025-06-25 ENCOUNTER — LAB (OUTPATIENT)
Dept: LAB | Facility: CLINIC | Age: 85
End: 2025-06-25
Attending: PHYSICIAN ASSISTANT
Payer: COMMERCIAL

## 2025-06-25 ENCOUNTER — OFFICE VISIT (OUTPATIENT)
Dept: CARDIOLOGY | Facility: CLINIC | Age: 85
End: 2025-06-25
Attending: PHYSICIAN ASSISTANT
Payer: COMMERCIAL

## 2025-06-25 ENCOUNTER — RESULTS FOLLOW-UP (OUTPATIENT)
Dept: RHEUMATOLOGY | Facility: CLINIC | Age: 85
End: 2025-06-25

## 2025-06-25 VITALS
SYSTOLIC BLOOD PRESSURE: 128 MMHG | HEART RATE: 56 BPM | WEIGHT: 184 LBS | BODY MASS INDEX: 30.62 KG/M2 | OXYGEN SATURATION: 97 % | DIASTOLIC BLOOD PRESSURE: 65 MMHG

## 2025-06-25 VITALS
BODY MASS INDEX: 30.77 KG/M2 | DIASTOLIC BLOOD PRESSURE: 65 MMHG | WEIGHT: 184.9 LBS | HEART RATE: 56 BPM | OXYGEN SATURATION: 97 % | SYSTOLIC BLOOD PRESSURE: 128 MMHG

## 2025-06-25 DIAGNOSIS — I50.32 CHRONIC HEART FAILURE WITH PRESERVED EJECTION FRACTION (H): ICD-10-CM

## 2025-06-25 DIAGNOSIS — M06.00 SERONEGATIVE RHEUMATOID ARTHRITIS (H): ICD-10-CM

## 2025-06-25 DIAGNOSIS — M10.9 ACUTE GOUT OF FOOT, UNSPECIFIED CAUSE, UNSPECIFIED LATERALITY: Primary | ICD-10-CM

## 2025-06-25 DIAGNOSIS — Z79.899 LONG-TERM USE OF PLAQUENIL: ICD-10-CM

## 2025-06-25 DIAGNOSIS — R53.83 OTHER FATIGUE: Primary | ICD-10-CM

## 2025-06-25 DIAGNOSIS — G89.29 OTHER CHRONIC PAIN: ICD-10-CM

## 2025-06-25 DIAGNOSIS — M10.9 GOUT, UNSPECIFIED CAUSE, UNSPECIFIED CHRONICITY, UNSPECIFIED SITE: ICD-10-CM

## 2025-06-25 DIAGNOSIS — M10.9 ACUTE GOUT OF FOOT, UNSPECIFIED CAUSE, UNSPECIFIED LATERALITY: ICD-10-CM

## 2025-06-25 DIAGNOSIS — Z79.4 TYPE 2 DIABETES MELLITUS WITH HYPERGLYCEMIA, WITH LONG-TERM CURRENT USE OF INSULIN (H): ICD-10-CM

## 2025-06-25 DIAGNOSIS — N18.31 STAGE 3A CHRONIC KIDNEY DISEASE (H): ICD-10-CM

## 2025-06-25 DIAGNOSIS — M35.02 SJOGREN'S SYNDROME WITH LUNG INVOLVEMENT (H): ICD-10-CM

## 2025-06-25 DIAGNOSIS — I50.30 HEART FAILURE WITH PRESERVED EJECTION FRACTION, UNSPECIFIED HF CHRONICITY (H): ICD-10-CM

## 2025-06-25 DIAGNOSIS — R53.83 OTHER FATIGUE: ICD-10-CM

## 2025-06-25 DIAGNOSIS — E11.65 TYPE 2 DIABETES MELLITUS WITH HYPERGLYCEMIA, WITH LONG-TERM CURRENT USE OF INSULIN (H): ICD-10-CM

## 2025-06-25 DIAGNOSIS — G57.01 PIRIFORMIS SYNDROME, RIGHT: ICD-10-CM

## 2025-06-25 LAB
ALT SERPL W P-5'-P-CCNC: 14 U/L (ref 0–50)
ANION GAP SERPL CALCULATED.3IONS-SCNC: 13 MMOL/L (ref 7–15)
AST SERPL W P-5'-P-CCNC: 18 U/L (ref 0–45)
BASOPHILS # BLD AUTO: 0.1 10E3/UL (ref 0–0.2)
BASOPHILS NFR BLD AUTO: 1 %
BUN SERPL-MCNC: 16.1 MG/DL (ref 8–23)
CALCIUM SERPL-MCNC: 9.4 MG/DL (ref 8.8–10.4)
CHLORIDE SERPL-SCNC: 103 MMOL/L (ref 98–107)
CREAT SERPL-MCNC: 1.04 MG/DL (ref 0.51–0.95)
CRP SERPL-MCNC: 10.6 MG/L
EGFRCR SERPLBLD CKD-EPI 2021: 52 ML/MIN/1.73M2
EOSINOPHIL # BLD AUTO: 0.2 10E3/UL (ref 0–0.7)
EOSINOPHIL NFR BLD AUTO: 2 %
ERYTHROCYTE [DISTWIDTH] IN BLOOD BY AUTOMATED COUNT: 14 % (ref 10–15)
ERYTHROCYTE [SEDIMENTATION RATE] IN BLOOD BY WESTERGREN METHOD: 12 MM/HR (ref 0–30)
GLUCOSE SERPL-MCNC: 211 MG/DL (ref 70–99)
HCO3 SERPL-SCNC: 24 MMOL/L (ref 22–29)
HCT VFR BLD AUTO: 43.4 % (ref 35–47)
HGB BLD-MCNC: 13.9 G/DL (ref 11.7–15.7)
IMM GRANULOCYTES # BLD: 0.1 10E3/UL
IMM GRANULOCYTES NFR BLD: 1 %
LYMPHOCYTES # BLD AUTO: 1 10E3/UL (ref 0.8–5.3)
LYMPHOCYTES NFR BLD AUTO: 10 %
MCH RBC QN AUTO: 29.5 PG (ref 26.5–33)
MCHC RBC AUTO-ENTMCNC: 32 G/DL (ref 31.5–36.5)
MCV RBC AUTO: 92 FL (ref 78–100)
MONOCYTES # BLD AUTO: 0.9 10E3/UL (ref 0–1.3)
MONOCYTES NFR BLD AUTO: 9 %
NEUTROPHILS # BLD AUTO: 7.6 10E3/UL (ref 1.6–8.3)
NEUTROPHILS NFR BLD AUTO: 78 %
NRBC # BLD AUTO: 0 10E3/UL
NRBC BLD AUTO-RTO: 0 /100
PLATELET # BLD AUTO: 188 10E3/UL (ref 150–450)
POTASSIUM SERPL-SCNC: 3.8 MMOL/L (ref 3.4–5.3)
RBC # BLD AUTO: 4.71 10E6/UL (ref 3.8–5.2)
SODIUM SERPL-SCNC: 140 MMOL/L (ref 135–145)
TSH SERPL DL<=0.005 MIU/L-ACNC: 3.22 UIU/ML (ref 0.3–4.2)
URATE SERPL-MCNC: 4.1 MG/DL (ref 2.4–5.7)
WBC # BLD AUTO: 9.7 10E3/UL (ref 4–11)

## 2025-06-25 PROCEDURE — 84443 ASSAY THYROID STIM HORMONE: CPT | Performed by: PATHOLOGY

## 2025-06-25 PROCEDURE — 85652 RBC SED RATE AUTOMATED: CPT | Performed by: PATHOLOGY

## 2025-06-25 PROCEDURE — G0463 HOSPITAL OUTPT CLINIC VISIT: HCPCS | Performed by: INTERNAL MEDICINE

## 2025-06-25 PROCEDURE — G0463 HOSPITAL OUTPT CLINIC VISIT: HCPCS | Performed by: PHYSICIAN ASSISTANT

## 2025-06-25 PROCEDURE — 80048 BASIC METABOLIC PNL TOTAL CA: CPT | Performed by: PATHOLOGY

## 2025-06-25 PROCEDURE — 3074F SYST BP LT 130 MM HG: CPT | Performed by: INTERNAL MEDICINE

## 2025-06-25 PROCEDURE — G2211 COMPLEX E/M VISIT ADD ON: HCPCS | Performed by: INTERNAL MEDICINE

## 2025-06-25 PROCEDURE — 85025 COMPLETE CBC W/AUTO DIFF WBC: CPT | Performed by: PATHOLOGY

## 2025-06-25 PROCEDURE — 84450 TRANSFERASE (AST) (SGOT): CPT | Performed by: PATHOLOGY

## 2025-06-25 PROCEDURE — 3078F DIAST BP <80 MM HG: CPT | Performed by: INTERNAL MEDICINE

## 2025-06-25 PROCEDURE — 1126F AMNT PAIN NOTED NONE PRSNT: CPT | Performed by: INTERNAL MEDICINE

## 2025-06-25 PROCEDURE — 86140 C-REACTIVE PROTEIN: CPT | Performed by: PATHOLOGY

## 2025-06-25 PROCEDURE — 36415 COLL VENOUS BLD VENIPUNCTURE: CPT | Performed by: PATHOLOGY

## 2025-06-25 PROCEDURE — 99215 OFFICE O/P EST HI 40 MIN: CPT | Performed by: INTERNAL MEDICINE

## 2025-06-25 PROCEDURE — 84550 ASSAY OF BLOOD/URIC ACID: CPT | Performed by: PATHOLOGY

## 2025-06-25 PROCEDURE — 84460 ALANINE AMINO (ALT) (SGPT): CPT | Performed by: PATHOLOGY

## 2025-06-25 RX ORDER — TRAMADOL HYDROCHLORIDE 50 MG/1
50 TABLET ORAL PRN
Status: CANCELLED | OUTPATIENT
Start: 2025-06-25

## 2025-06-25 RX ORDER — PREDNISONE 10 MG/1
TABLET ORAL
Qty: 30 TABLET | Refills: 3 | Status: SHIPPED | OUTPATIENT
Start: 2025-06-25

## 2025-06-25 RX ORDER — POTASSIUM CHLORIDE 1500 MG/1
40 TABLET, EXTENDED RELEASE ORAL 2 TIMES DAILY
Qty: 360 TABLET | Refills: 1 | Status: SHIPPED | OUTPATIENT
Start: 2025-06-25

## 2025-06-25 RX ORDER — TRAMADOL HYDROCHLORIDE 50 MG/1
50 TABLET ORAL PRN
COMMUNITY
Start: 2025-04-21 | End: 2025-06-25

## 2025-06-25 RX ORDER — TRAMADOL HYDROCHLORIDE 50 MG/1
50 TABLET ORAL EVERY 12 HOURS PRN
Qty: 60 TABLET | Refills: 5 | Status: SHIPPED | OUTPATIENT
Start: 2025-06-25 | End: 2025-06-28

## 2025-06-25 RX ORDER — TORSEMIDE 20 MG/1
TABLET ORAL
Qty: 325 TABLET | Refills: 3 | Status: SHIPPED | OUTPATIENT
Start: 2025-06-25

## 2025-06-25 RX ORDER — METHOCARBAMOL 750 MG/1
750 TABLET, FILM COATED ORAL 3 TIMES DAILY PRN
Qty: 90 TABLET | Refills: 3 | Status: CANCELLED | OUTPATIENT
Start: 2025-06-25

## 2025-06-25 ASSESSMENT — PAIN SCALES - GENERAL
PAINLEVEL_OUTOF10: NO PAIN (0)
PAINLEVEL_OUTOF10: NO PAIN (0)

## 2025-06-25 NOTE — PROGRESS NOTES
Rheumatology Clinic Visit Note  Zulma Lopez MD  DOS: 2025  Date of last visit: 2025    Name: Betty Tee  MRN: [id number]  Age: 84 year old  : 1940  Reason for visit: Follow up visit for PMR, presumed gout, primary Sjogren's syndrome    # Sjogren's syndrome since  with borderline +RG, +SSA, and lip biopsy focus score of 1. Ongoing dry mouth on evoxac qod  # Follicular bronchiolitis, prior mild ILD   # Osteopenia on DEXA 2019 with T score=-2.1 with decline in bone density on fosamax  # Chronic prednisone use  # DM  # A fib  # Severe R hip OA  # PMR stable on prednisone 5 mg every day  #Presumed gout flare, recovered  #ESOB      Assessment and Plan:    New Dx of PMR. Severe R hip pain is due to severe OA based on 2020 hip MRI. She prefered to avoid hip arthroplasty in the past but it is quite bothersome and would like to see ortho. Her inflammation markers improved on prednisone, PMR responded to prednisone, now is 5 mg qd.     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 Completed pulmonary rehab in 2019 where she was able to exercise without oxygen. GFR stable.    On HCQ since 2019 with significant improvement of joint pain. No retinal toxicity on eye exam done 2021. Tolerates HCQ well.     Sister Betty recovered from flare of presumed gout (or pseudogout given previous nl SUA) over L foot. She responded to high dose prednisone (40-30-20-10 mg every day each for 3 days) in the past, now is on 5 mg every day.  Given her DM, osteopenia, highly recommend to start using anakinra prn for gout flares, no flares and has not required it yet.     She preferred in the past not to start daily urate-lowering therapy and her uric acid has been ~6 so allopurinol deferred.     2022: Sister Betty's major complaint at last visit was ESOB which is improved after Tx of B12  deficiency, iron deficiency due to GIB. 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 Her major complaint today is severe R hip pain due to OA which eventually requires R hip replacement, but she needs to be medically clear for surgery. Discussed pain mx and advised follow up with ortho.      1/13/2023: Stable, no active gout today. B12/iron deficiency anemia has improved. On allopurinol 50 mg po every day.    7/21/2023: On increased allopurinol 100 mg every day since 1/2023, severe polyarticular gout flare last week requiring ER visit and prednisone 40-30-20-10 mg every day, each x 3 days then 5 mg a day, as anakinra daily inj was not enough to control it.    Recovered from gout flare, discussed ongoing m/o gout.    Plan:    Prednisone 5 mg a day    Norco as needed for pain      Cevimeline for dry mouth could be taken 3 times a day (she takes it every other day as does not like to take so many pills)    Stay on fosamax weekly    Yearly eye exam on HCQ    Plaquenil 1 tab a day    For gout flares: take anakinra daily shots x 5-7 days, if not enough, take the prednisone as 40-30-20-10 mg a day, each course x 3 days then go back to 5 mg a day    Return in 6 months (in person)    Labs today    Allopurinol 100 mg a day    Return in person in 6 months    11/3/2023:    Recent gout flare, affecting multiple joints, responded to anakinra well. Will continue anakinra prn. Will re-check SUA with next blood draw and adjust allopurinol as needed. Will refer to endo to address bone health, no improvement of fosamax.    Plan:    Refer to Yvonne Hunt endocrinologist for osteoporosis management    Labs in 1/2024    Keep 1/2024 appointment with me      2/28/2024:    Doing so well, no recent gout flare to use anakinra or prednisone. Significant improvement of inflammation markers.    Plan:    Prednisone 5 mg a day    Norco as needed for pain      Cevimeline for dry mouth could be taken 3 times a day (she takes it  every other day as does not like to take so many pills)    Stay on fosamax weekly, upcoming endocrine appointment to address bone health in 5/2024    Yearly eye exam on HCQ    Plaquenil 1 tab a day    Allopurinol 100 mg every day    Labs q6-12 months, reviewed/stable    For gout flares: take anakinra daily shots x 5-7 days, if not enough, take the prednisone as 40-30-20-10 mg a day, each course x 3 days then go back to 5 mg a day        Return in 6 months in person      8/23/2024:    Urgent visit for L wrist erythema/pain/mild swelling after a fall in July, improved by taking prednisone 40 mg every day taper+anakinra x 3 doses (finished last wk) but not resolved, very painful, worried about participating in PT. Wishes not to get local steroid inj by ortho.    Based on presentation, seems like pseudogout (more likely)/gout flare of l wrist triggered by trauma.    Discussed plan of care. Recommend not to do PT till inflammation settles down.    Plan:    Percocet as needed      Plan:      Prednisone 60-40-20-10 mg a day, each course x 3 days then go back on 5 mg a day    Go back on anakinra inj daily x 3 more days    Watch the blood sugar on prednisone    Keep September appointment    9/19/2024:    L wrist flare has subsided, but still has residual pain, she considers getting a MRI done. Now her R foot hurting, no imaging. Could be start of gout flare. Was advised to use anakinra.    Plan 9/19/2024:    Will continue with HCQ, yearly eye exam, prednisone 5 mg every day.    Start anakinra daily shots and use it till pain goes away    Let me know if you want L wrist MRI to be done    Return in about 3 months (video)      1/23/2025:    Doing well, no recent gout flares, L wrist pain improved after steroid inj.      Plan:    Labs in March 2025    Return in 6 months (in person)      Today 6/25/25:  Coming in with potential gout flare today. Based on history, swelling and erythema consistent with some component of gout, but  while this has improved with Anakinra and prednisone it has not resolved completely. Given previous right wrist xray on file that shows significant osteoarthritis and physical exams showing focal tenderness and limited ROM, ongoing pain is likely related to underlying osteoarthritis. Since flare ups have occurred consistently over the past couple months, there is likely ongoing inflammation that should be successfully controlled with regular Anakinra. Labs today are stable with normal uric acid and mildly elevated CRP to 10, which is improved from previous CRP.     Plan:  - Change anakinra to every other day injection regardless of flare status for the next month  - Follow up with Dr. Nikko Goode for potential steroid wrist injections if desired for osteoarthritis  - Prednisone taper if needed  - Percocet and tramadol for pain control if needed.       TT 30 min was spent on date of the encounter doing chart review, history and exam, documentation and further activities as noted above. Any prior notes, outside records, laboratory results, and imaging studies were reviewed if relevant.        The longitudinal plan of care for the diagnosis(es)/condition(s) as documented were addressed during this visit. Due to the added complexity in care, I will continue to support Betty in the subsequent management and with ongoing continuity of care.    Joyce Diaz, MS3    Zulma Lopez MD    No orders of the defined types were placed in this encounter.       HPI:   Betty Tee is a 81 year old WF with a history of primary Sjogren's syndrome, PMR, OA who presents for follow-up. She was diagnosed with Sjogren's syndrome in 2015 with borderline +RG, +SSA, and lip biopsy focus score of 1. Associated ILD and joint pain are prednisone-responsive which support the diagnosis.     10/1/2021: No gout flares since last visit so has not needed anakinra. She continues to have R hip pain related to severe OA but does not want to  pursue surgery. She recently started pool therapy and began taking CBD oil yesterday. Is hoping to pursue medical marijuana in the future as she does not want to be dependent on norco. Aside from hip pain has otherwise been doing well.        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 She is on prednisone 5 mg a day, she was given prednisone taper recently for possible L foot gout flare which helped. Did not flare again to need using anakinra shots. She has severe R hip pain. Saw ortho, had R hip MRI on 9/5/2020 which showed severe OA, R hip arthroplasty was recommended. She would prefer to delay R hip arthroplasty, had R hip inj on 2/24, NSAIDs are not allowed with CKD. Norco helps but she does not like to be dependent on it, takes it rarely. No L foot pain today. Her hip/leg pain/back pain is getting better, going to PT, doing exercises at home, last time elbow massage caused pain. Epidural shot helped. Has dry mouth. SOB is stable at baseline.  Last 3 wk has been hard, her doctor took her off gabapentin, sweat/chills and loss of appetite. Reason was being on other meds. Now off gabapentin. Had several gushing bowel explosion, could not make it to the bathroom. Random, unpredictable. No triggers. Has had this problem for years.     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    No gout flares, has not required anakinra, it is in the fridge.    Has breathing issue, ESOB is worse. O2 level is good. Saw PCP, was recomemnded to do follow up with cardiology.    Getting R hip steroid inj, last one was for bursitis. Still hurts crissy today. Saw pain mx yesterday. Got minimal exercises to help moving.    7/8/2022:    Sister Betty presents for follow up. At last visit,w as found to be anemic which explained her SOB. She had iron deficiency and B12 deficiency. Was found to have GIB. Had multiple GI visits, EGD, was treated with cauterization x 3  for GAVE. This AM, had brown  stool not black.    SOB is improved.    No gout flares since last visit.    Her major complaint is severe R hip pain, not responding to current pain mx, considers hip arthroplasty, but was told to wait till anemia gets fixed, also has to figure out her heart condition.      1/13/2023:     No gout flare today  Anakinra prn helps with flares  SOB is better  Hip OA pain is the same, considers arthroplasty.      7/21/2023:    Reviewed chart, labs, recent events.    -s/p R hip arthroplasty, recovered well  -Severe polyarticular gout flare last week requiring ER visit and prednisone 40-30-20-10 mg every day, each x 3 days then 5 mg a day, as anakinra qd inj was not enough to control it.  -better now    11/3/2023: Doing better than last visit, still flares with gout but not as bad, anakinra helps.    2/28/2024: Doing so well, no gout flares, no complaints.    8/23/2024:    Flare, severe L wrist pain/swelling after fall in July. No fracture. Prednisone+anakinra helped with decreased swelling, pain is severe.       9/19/2024:    -L writ swelling/pain is much better after anakinra, prednisone taper, but still has residual pain    - r foot just started to hurt      1/23/2025:    Doing well, no recent gout flares, l wrist pain finally got better after local steroid inj\\\    Today 6/25/25:    Right wrist swollen intermittently for a couple months, markedly swollen and much bigger than the left wrist when the swelling is at its peak. Associated with pain in wrist, shooting pain through the fingers, and sometimes up the arm as well. Patient has used 3 Anakinra injections as well as as 5 day prednisone burst to resolve flare but symptoms did not improve initially. Now, swelling has decreased quite a bit and pain has improved to 3.5/10. Using an old prescription of methocarbamol and some tylenol, and the tylenol has helped.     Having ongoing dry mouth but helped by pilocarpine.       Review of Systems:   A comprehensive ROS was  done. Positives are per HPI.      Active Medications:     Outpatient Medications Prior to Visit   Medication Sig Dispense Refill    acyclovir (ZOVIRAX) 400 MG tablet Take 1 tablet (400 mg) by mouth every 8 hours for 7 days. For herpes outbreak 21 tablet 0    acyclovir (ZOVIRAX) 5 % external ointment Apply topically as needed (6 times day PRN for outbreaks). As needed for outbreaks 15 g 2    albuterol (PROAIR HFA/PROVENTIL HFA/VENTOLIN HFA) 108 (90 Base) MCG/ACT inhaler Inhale 2 puffs into the lungs every 6 hours as needed for wheezing or shortness of breath      allopurinol (ZYLOPRIM) 100 MG tablet TAKE 1 TABLET BY MOUTH EVERY DAY 90 tablet 1    anakinra (KINERET) 100 MG/0.67ML SOSY injection Inject 0.67 mLs (100 mg) subcutaneously daily. 20.1 mL 3    aspirin (ASA) 81 MG EC tablet Take 1 tablet (81 mg) by mouth 2 times daily 60 tablet 0    atorvastatin (LIPITOR) 10 MG tablet Take 0.5 tablets (5 mg) by mouth daily. 45 tablet 1    azithromycin (ZITHROMAX) 250 MG tablet TAKE 1 TABLET BY MOUTH EVERY DAY 30 tablet 4    blood glucose (NO BRAND SPECIFIED) lancets standard Use to test blood sugar 3 times daily or as directed 100 Lancet 3    BREO ELLIPTA 100-25 MCG/ACT inhaler TAKE 1 PUFF BY MOUTH EVERY DAY 1 each 11    cevimeline (EVOXAC) 30 MG capsule Take 1 capsule (30 mg) by mouth 3 times daily as needed 270 capsule 3    Cholecalciferol (VITAMIN D3) 50 MCG (2000 UT) CAPS TAKE 100 MCG BY MOUTH DAILY (TAKE 2 TABLET (50 MCG) BY MOUTH DAILY - ORAL) (Patient taking differently: Take 1,000 Units by mouth daily. (Take 2 tablet (2000 units by mouth daily - Oral)) 180 capsule 3    COMPOUNDED NON-CONTROLLED SUBSTANCE (CMPD RX) - PHARMACY TO MIX COMPOUNDED MEDICATION Estriol 1 mg/g Apply small amount to finger and apply to inside vagina daily for 2 weeks then twice weekly Route: vaginally Dispense 30 grams 11 refills 30 g 11    CONTOUR NEXT TEST test strip USE TO TEST BLOOD SUGARS 3 TIMES DAILY 300 strip 1    cyanocobalamin  (VITAMIN B-12) 1000 MCG tablet Take 1 tablet (1,000 mcg) by mouth three times a week      empagliflozin (JARDIANCE) 10 MG TABS tablet Take 10 mg by mouth daily.      empagliflozin (JARDIANCE) 10 MG TABS tablet Take 1 tablet (10 mg) by mouth daily. 90 tablet 1    escitalopram (LEXAPRO) 20 MG tablet TAKE 1 TABLET BY MOUTH EVERY DAY 90 tablet 1    estradiol (VAGIFEM) 10 MCG TABS vaginal tablet Place 1 tablet (10 mcg) vaginally twice a week. 24 tablet 3    fluticasone (FLONASE) 50 MCG/ACT nasal spray SPRAY 1-2 SPRAYS INTO BOTH NOSTRILS DAILY AS NEEDED FOR ALLERGIES 16 mL 11    hydroxychloroquine (PLAQUENIL) 200 MG tablet TAKE 1 TABLET (200 MG) BY MOUTH DAILY GET ANNUAL EYE EXAMS FOR MONITORING AND FAX [phone] 90 tablet 3    insulin glargine (LANTUS SOLOSTAR) 100 UNIT/ML pen INJECT 24 UNITS SUBCUTANEOUSLY AT BEDTIME. 30 mL 0    insulin lispro (HUMALOG KWIKPEN) 100 UNIT/ML (1 unit dial) KWIKPEN Sliding scale insulin; tests BG three times day If BG <150, no insulin given If -200 take 8 units If -250 take 10 units If -300 take 12 units If -350 take 16 units If BG >350 take 20 units 15 mL 1    insulin pen needle (BD PEN NEEDLE JANE 2ND GEN) 32G X 4 MM miscellaneous USE TO TEST 4 TIMES A  each 1    ketoconazole (NIZORAL) 2 % external cream Apply topically 2 times daily as needed for itching 60 g 1    levocetirizine (XYZAL) 5 MG tablet TAKE 1 TABLET BY MOUTH EVERY DAY IN THE EVENING 90 tablet 1    lidocaine (LIDODERM) 5 % patch APPLY PATCH TO PAINFUL AREA FOR UP TO 12 H WITHIN A 24 H PERIOD. REMOVE AFTER 12 HOURS. 30 patch 2    loratadine (CLARITIN) 10 MG tablet TAKE 1 TABLET BY MOUTH EVERY DAY 90 tablet 2    methocarbamol (ROBAXIN) 750 MG tablet Take 1 tablet (750 mg) by mouth 3 times daily as needed for muscle spasms 90 tablet 3    metoprolol succinate ER (TOPROL XL) 25 MG 24 hr tablet Take 25 mg by mouth daily.      metoprolol succinate ER (TOPROL XL) 25 MG 24 hr tablet Take 1 tablet  (25 mg) by mouth 2 times daily. 180 tablet 4    mirabegron (MYRBETRIQ) 25 MG 24 hr tablet Take 1 tablet (25 mg) by mouth daily. For additional refills, please schedule a follow-up appointment. 90 tablet 3    mirabegron (MYRBETRIQ) 25 MG 24 hr tablet Take 1 tablet (25 mg) by mouth daily. 30 tablet 2    montelukast (SINGULAIR) 10 MG tablet TAKE 1 TABLET BY MOUTH EVERYDAY AT BEDTIME 90 tablet 3    MOUNJARO 15 MG/0.5ML SOAJ auto-injector pen Inject 0.5 mLs (15 mg) subcutaneously every 7 days. 6 mL 3    oxyCODONE-acetaminophen (PERCOCET) 5-325 MG tablet Take 1 tablet by mouth every 8 hours as needed for pain. 60 tablet 0    pantoprazole (PROTONIX) 40 MG EC tablet TAKE 1 TABLET (40 MG) BY MOUTH DAILY (REPLACES FAMOTIDINE- SHOULD STOP TAKING FAMOTIDINE) 90 tablet 1    polyethylene glycol (MIRALAX) 17 g packet Take 17 g by mouth daily 10 packet 0    potassium chloride ashwini ER (KLOR-CON M20) 20 MEQ CR tablet TAKE 2 TABLETS (40 MEQ) BY MOUTH EVERY MORNING AND 1 TABLET (20 MEQ) EVERY EVENING. 270 tablet 3    predniSONE (DELTASONE) 10 MG tablet TAKE 40mg daily for 3 days, then 30mg daily for 3 days, then 20mg daily for 3 days then 10mg daily for 3 days AS NEEDED FOR GOUT FLARES, THEN DROP BACK TO BASELINE 5 MG EVERY DAY. 30 tablet 3    predniSONE (DELTASONE) 5 MG tablet Take 1 tablet (5 mg) by mouth daily. 90 tablet 3    predniSONE (DELTASONE) 5 MG tablet Take 1 tablet (5 mg) by mouth daily. 90 tablet 1    torsemide (DEMADEX) 20 MG tablet Take 2 tablets (40 mg) by mouth every morning AND 1 tablet (20 mg) daily at 2 pm. 270 tablet 3    traZODone (DESYREL) 50 MG tablet Take 1-2 tablets ( mg) by mouth nightly as needed for sleep. 90 tablet 0     Facility-Administered Medications Prior to Visit   Medication Dose Route Frequency Provider Last Rate Last Admin    denosumab (PROLIA) injection 60 mg  60 mg Subcutaneous Q6 Months    60 mg at 01/17/25 2878     Allergies:   Allergies   Allergen Reactions    Amoxicillin-Pot  Clavulanate Nausea and Vomiting    Amoxicillin-Pot Clavulanate     Codeine Nausea and Vomiting     PN: LW Reaction: HIVES    Penicillins Nausea and Vomiting     PN: LW Reaction: GI Upset    Phenobarbital Itching    Seasonal Allergies         Past Medical History:  Alcohol abuse, in remission.   Allergic rhinitis.   Antiplatelet long-term use.   Atrial fibrillation.   Cardiomegaly.   Osteopenia.   Diverticulosis.   Benign essential hypertension.   GERD.   Insomnia.   Irregular heart beat.   Lumbago.   Major depressive disorder, recurrent episode, moderate.   ANGELICA.  Osteoarthrosis.   Sjogren's syndrome.   Sleep apnea.   Tobacco use disorder.   TMJ disorder.   RLS.  Major depressive disorder.   Hypertension.   Sciatica.   Colouterine fistula.   Left ventricular hypertrophy.   Breat fibroadenoma.   DVT.   Fatty liver disease, nonalcoholic.   Rib pain.   Neck mass.   Interstitial lung disease.   Acute and chronic respiratory failure with hypoxia.   Type II diabetes mellitus.   Hyperlipidemia.   Inflammatory arthritis.      Past Surgical History:  Back surgery.   Left breast biopsy.   Appendectomy.   Exploratory laparotomy.   Cardiac surgery.   Cholecystectomy.   Left colectomy.   Total abdominal hysterectomy with bilateral salpingo-oophorectomy.   Insert ureter stent.   Flexible sigmoidoscopy.   Ileostomy takedown.     Family History:   Mother: Positive for CAD, heart disease, hypertension, cerebrovascular disease, hyperlipidemia.   Father: Positive for alcohol/drug abuse, Alzheimer disease, dementia, hypertension, hyperlipidemia.   Sister: Positive for CAD, hypertension, and colorectal cancer.   Sister: Positive for hypertension and hyperlipidemia.   Sister: Positive for diabetes, lung cancer, asthma, and heart disease.   Brother: Positive for Parkinsonism and substance abuse.   Brother: Positive for hypertension, diverticulitis, and prostate cancer.   Daughter: Positive for breast cancer.        Social History:   She is  a former smoker; quit in 2011. She has a history of alcohol use but stopped drinking in 1986.      Physical Exam:     LMP  (LMP Unknown)     Constitutional: NAD, very pleasant, sister Nghia present   Eyes: Normal EOM, conjunctiva, sclera  MSK: limited ROM of right wrist on extension and flexion. Limited finger flexion compared to left hand. Minimal synovitis on right wrist, no erythema.  Skin: No rash  Neuro: grossly non-focal  Psych: Normal affect.    Images:  CT Chest w/o contrast (7/25/18):  Findings remain most consistent with small airway disease  and/or nonclassifiable interstitial lung disease and are not  significantly changed from the March 2017 comparison.    Per radiology.    Laboratory:       Latest Ref Rng & Units 3/12/2025    11:35 AM 5/21/2025    11:31 AM 6/25/2025    11:58 AM   RHEUM RESULTS   Albumin 3.5 - 5.2 g/dL 4.1      ALT 0 - 50 U/L 12   14    AST 0 - 45 U/L 19   18    Complement C3 81 - 157 mg/dL 122      Complement C4 13 - 39 mg/dL 26      Creatinine 0.51 - 0.95 mg/dL 1.07  1.15  1.04    CRP Inflammation <5.00 mg/L 12.60   10.60    GFR Estimate >60 mL/min/1.73m2 51  46  52    Hematocrit 35.0 - 47.0 % 39.8   43.4    Hemoglobin 11.7 - 15.7 g/dL 13.2   13.9    WBC 4.0 - 11.0 10e3/uL 8.3   9.7    RBC Count 3.80 - 5.20 10e6/uL 4.37   4.71    RDW 10.0 - 15.0 % 14.5   14.0    MCHC 31.5 - 36.5 g/dL 33.2   32.0    MCV 78 - 100 fL 91   92    Platelet Count 150 - 450 10e3/uL 179   188    Sed Rate 0 - 30 mm/hr 18   12        Rheumatoid Factor   Date Value Ref Range Status   07/24/2015 <20 <20 IU/mL Final   ,  ,   Cyclic Cit Pept IgG/IgA   Date Value Ref Range Status   07/24/2015 <20  Interpretation:  Negative   <20 UNITS Final   ,  ,   Scleroderma Antibody Scl-70 CECILIA IgG   Date Value Ref Range Status   07/24/2015  0.0 - 0.9 AI Final    <0.2  Negative   Antibody index (AI) values reflect qualitative changes in antibody   concentration that cannot be directly associated with clinical condition or   disease  state.       SSA (Ro) (CECILIA) Antibody, IgG   Date Value Ref Range Status   07/24/2015 1.6 (H) 0.0 - 0.9 AI Final     Comment:     Positive   Antibody index (AI) values reflect qualitative changes in antibody   concentration that cannot be directly associated with clinical condition or   disease state.       SSB (La) (CECILIA) Antibody, IgG   Date Value Ref Range Status   07/24/2015  0.0 - 0.9 AI Final    <0.2  Negative   Antibody index (AI) values reflect qualitative changes in antibody   concentration that cannot be directly associated with clinical condition or   disease state.       ,  ,   RG Screen by EIA   Date Value Ref Range Status   07/24/2015 <1.0  Interpretation:  Negative   <1.0 Final   ,  ,  ,  ,  ,  ,  ,  ,  ,  ,  ,  ,  ,  ,  ,  ,  ,  ,   Neutrophil Cytoplasmic IgG Antibody   Date Value Ref Range Status   07/24/2015   Final    <1:20  Reference range: <1:20  (Note)  The ANCA IFA is <1:20; therefore, no further testing will  be performed.  INTERPRETIVE INFORMATION: Anti-Neutrophil Cyto Ab, IgG  Neutrophil Cytoplasmic Antibodies (C-ANCA = granular  cytoplasmic staining, P-ANCA = perinuclear staining) are  found in the serum of over 90 percent of patients with  certain necrotizing systemic vasculitides, and usually in  less than 5 percent of patients with collagen vascular  disease or arthritis.  Performed by [facility],  [address] [place] [id number] [phone]  [email or web address], Burke Mckeon MD, Lab. Director       ,  ,  ,  ,  ,  ,  ,   IGG   Date Value Ref Range Status   07/24/2015 1320 695 - 1620 mg/dL Final   ,  ,  ,  ,  ,   Scleroderma Antibody Scl-70 CECILIA IgG   Date Value Ref Range Status   07/24/2015  0.0 - 0.9 AI Final    <0.2  Negative   Antibody index (AI) values reflect qualitative changes in antibody   concentration that cannot be directly associated with clinical condition or   disease state.          CBC RESULTS: 6/4/2021   Lab Test 06/04/21  1422   WBC 8.6   RBC 4.46   HGB 13.4   HCT  42.1   MCV 94   MCH 30.0   MCHC 31.8   RDW 15.5*        Last Comprehensive Metabolic Panel:  Sodium   Date Value Ref Range Status   05/21/2025 142 135 - 145 mmol/L Final   06/04/2021 137 133 - 144 mmol/L Final     Potassium   Date Value Ref Range Status   05/21/2025 4.1 3.4 - 5.3 mmol/L Final   08/23/2022 4.1 3.4 - 5.3 mmol/L Final   06/04/2021 4.0 3.4 - 5.3 mmol/L Final     Chloride   Date Value Ref Range Status   05/21/2025 102 98 - 107 mmol/L Final   08/23/2022 107 94 - 109 mmol/L Final   06/04/2021 106 94 - 109 mmol/L Final     Carbon Dioxide   Date Value Ref Range Status   06/04/2021 25 20 - 32 mmol/L Final     Carbon Dioxide (CO2)   Date Value Ref Range Status   05/21/2025 28 22 - 29 mmol/L Final   08/23/2022 22 20 - 32 mmol/L Final     Anion Gap   Date Value Ref Range Status   05/21/2025 12 7 - 15 mmol/L Final   08/23/2022 8 3 - 14 mmol/L Final   06/04/2021 6 3 - 14 mmol/L Final     Glucose   Date Value Ref Range Status   05/21/2025 245 (H) 70 - 99 mg/dL Final   08/23/2022 198 (H) 70 - 99 mg/dL Final   06/04/2021 142 (H) 70 - 99 mg/dL Final     GLUCOSE BY METER POCT   Date Value Ref Range Status   06/05/2023 259 (H) 70 - 99 mg/dL Final     Urea Nitrogen   Date Value Ref Range Status   05/21/2025 15.8 8.0 - 23.0 mg/dL Final   08/23/2022 17 7 - 30 mg/dL Final   06/04/2021 14 7 - 30 mg/dL Final     Creatinine   Date Value Ref Range Status   05/21/2025 1.15 (H) 0.51 - 0.95 mg/dL Final   06/04/2021 1.11 (H) 0.52 - 1.04 mg/dL Final     GFR Estimate   Date Value Ref Range Status   05/21/2025 46 (L) >60 mL/min/1.73m2 Final     Comment:     eGFR calculated using 2021 CKD-EPI equation.   06/04/2021 47 (L) >60 mL/min/[1.73_m2] Final     Comment:     Non  GFR Calc  Starting 12/18/2018, serum creatinine based estimated GFR (eGFR) will be   calculated using the Chronic Kidney Disease Epidemiology Collaboration   (CKD-EPI) equation.       Calcium   Date Value Ref Range Status   05/21/2025 9.8 8.8 -  10.4 mg/dL Final   06/04/2021 8.8 8.5 - 10.1 mg/dL Final       ESR 6/4/2021: 10.0    CRP 6/4/2021: 3.0    Uric acid 6/4/2021: 6.2    Component      Latest Ref Rng & Units 2/11/2022   WBC      4.0 - 11.0 10e3/uL 11.5 (H)   RBC Count      3.80 - 5.20 10e6/uL 3.47 (L)   Hemoglobin      11.7 - 15.7 g/dL 9.1 (L)   Hematocrit      35.0 - 47.0 % 30.2 (L)   MCV      78 - 100 fL 87   MCH      26.5 - 33.0 pg 26.2 (L)   MCHC      31.5 - 36.5 g/dL 30.1 (L)   RDW      10.0 - 15.0 % 15.1 (H)   Platelet Count      150 - 450 10e3/uL 283   % Neutrophils      % 76   % Lymphocytes      % 9   % Monocytes      % 11   % Eosinophils      % 2   % Basophils      % 1   % Immature Granulocytes      % 1   NRBCs per 100 WBC      <1 /100 0   Absolute Neutrophils      1.6 - 8.3 10e3/uL 8.9 (H)   Absolute Lymphocytes      0.8 - 5.3 10e3/uL 1.0   Absolute Monocytes      0.0 - 1.3 10e3/uL 1.2   Absolute Eosinophils      0.0 - 0.7 10e3/uL 0.2   Absolute Basophils      0.0 - 0.2 10e3/uL 0.1   Absolute Immature Granulocytes      <=0.4 10e3/uL 0.1   Absolute NRBCs      10e3/uL 0.0   Albumin Fraction      3.7 - 5.1 g/dL 3.9   Alpha 1 Fraction      0.2 - 0.4 g/dL 0.4   Alpha 2 Fraction      0.5 - 0.9 g/dL 0.8   Beta Fraction      0.6 - 1.0 g/dL 1.0   Gamma Fraction      0.7 - 1.6 g/dL 0.8   Monoclonal Peak      <=0.0 g/dL 0.0   ELP Interpretation:       Essentially normal electrophoretic pattern. No obvious monoclonal proteins seen. Pathologic significance requires clinical correlation. Meghan Brothers M.D., Ph.D.   Iron      35 - 180 ug/dL 17 (L)   Iron Binding Cap      240 - 430 ug/dL 354   Iron Saturation Index      15 - 46 % 5 (L)   Creatinine      0.52 - 1.04 mg/dL 1.00   GFR Estimate      >60 mL/min/1.73m2 56 (L)   % Retic      0.5 - 2.0 % 2.7 (H)   Absolute Retic      0.025 - 0.095 10e6/uL 0.090   ALT      0 - 50 U/L 17   Albumin      3.4 - 5.0 g/dL 3.3 (L)   AST      0 - 45 U/L 15   Sed Rate      0 - 30 mm/hr 36 (H)   CRP Inflammation      0.0 - 8.0  mg/L 12.5 (H)   Uric Acid      2.6 - 6.0 mg/dL 6.1 (H)   Immunofixation ELP       No monoclonal protein seen on immunofixation. Pathologic significance requires clinical correlation.  Meghan Brothers M.D., Ph.D.   Immunofix ELP Urine       No monoclonal protein seen on immunofixation. Pathologic significance requires clinical correlation.  Meghan Brothers M.D., Ph.D.   Total Protein Serum for ELP      6.8 - 8.8 g/dL 6.9   N-Terminal Pro Bnp      0 - 450 pg/mL 239   Haptoglobin      32 - 197 mg/dL 149   Vitamin B12      193 - 986 pg/mL 162 (L)   Ferritin      8 - 252 ng/mL 14

## 2025-06-25 NOTE — Clinical Note
2025       RE: Betty Tee  [address]  [place] [address]     Dear Colleague,    Thank you for referring your patient, Betty Tee, to the [facility] [place] at [facility]. Please see a copy of my visit note below.    Virtual Visit Details    Type of service:  Video Visit       Joined the call at 2025, 1:10:14 pm.  Left the call at 2025, 1:17:57 pm.      Originating Location (pt. Location): Home  Distant Location (provider location):  Off-site  Platform used for Video Visit: [facility]    Rheumatology Clinic Visit Note  Zulma Lopez MD  DOS:2025  Date of last visit: 2024    Name: Betty Tee  MRN: [id number]  Age: 84 year old  : 1940  Reason for visit: Follow up visit for PMR, presumed gout, primary Sjogren's syndrome    # Sjogren's syndrome since  with borderline +RG, +SSA, and lip biopsy focus score of 1. Ongoing dry mouth on evoxac qod  # Follicular bronchiolitis, prior mild ILD   # Osteopenia on DEXA 2019 with T score=-2.1 with decline in bone density on fosamax  # Chronic prednisone use  # DM  # A fib  # Severe R hip OA  # PMR stable on prednisone 5 mg every day  #Presumed gout flare, recovered  #ESOB      Assessment and Plan:    New Dx of PMR. Severe R hip pain is due to severe OA based on 2020 hip MRI. She prefered to avoid hip arthroplasty in the past but it is quite bothersome and would like to see ortho. Her inflammation markers improved on prednisone, PMR responded to prednisone, now is 5 mg qd.     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 Completed pulmonary rehab in 2019 where she was able to exercise without oxygen. GFR stable.    On HCQ since 2019 with significant improvement of joint pain. No retinal toxicity on eye exam done  1/2021. Tolerates HCQ well.     Sister Betty recovered from flare of presumed gout (or pseudogout given previous nl SUA) over L foot. She responded to high dose prednisone (40-30-20-10 mg every day each for 3 days) in the past, now is on 5 mg every day.  Given her DM, osteopenia, highly recommend to start using anakinra prn for gout flares, no flares and has not required it yet.     She preferred in the past not to start daily urate-lowering therapy and her uric acid has been ~6 so allopurinol deferred.     7/8/2022: Sister Betty's major complaint at last visit was ESOB which is improved after Tx of B12 deficiency, iron deficiency due to GIB. 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 Her major complaint today is severe R hip pain due to OA which eventually requires R hip replacement, but she needs to be medically clear for surgery. Discussed pain mx and advised follow up with ortho.      1/13/2023: Stable, no active gout today. B12/iron deficiency anemia has improved. On allopurinol 50 mg po every day.    7/21/2023: On increased allopurinol 100 mg every day since 1/2023, severe polyarticular gout flare last week requiring ER visit and prednisone 40-30-20-10 mg every day, each x 3 days then 5 mg a day, as anakinra daily inj was not enough to control it.    Recovered from gout flare, discussed ongoing m/o gout.    Plan:    Prednisone 5 mg a day    Norco as needed for pain      Cevimeline for dry mouth could be taken 3 times a day (she takes it every other day as does not like to take so many pills)    Stay on fosamax weekly    Yearly eye exam on HCQ    Plaquenil 1 tab a day    For gout flares: take anakinra daily shots x 5-7 days, if not enough, take the prednisone as 40-30-20-10 mg a day, each course x 3 days then go back to 5 mg a day    Return in 6 months (in person)    Labs today    Allopurinol 100 mg a day    Return in person in 6 months    11/3/2023:    Recent gout flare, affecting  multiple joints, responded to anakinra well. Will continue anakinra prn. Will re-check SUA with next blood draw and adjust allopurinol as needed. Will refer to endo to address bone health, no improvement of fosamax.    Plan:    Refer to Yvonne Hunt endocrinologist for osteoporosis management    Labs in 1/2024    Keep 1/2024 appointment with me      2/28/2024:    Doing so well, no recent gout flare to use anakinra or prednisone. Significant improvement of inflammation markers.    Plan:    Prednisone 5 mg a day    Norco as needed for pain      Cevimeline for dry mouth could be taken 3 times a day (she takes it every other day as does not like to take so many pills)    Stay on fosamax weekly, upcoming endocrine appointment to address bone health in 5/2024    Yearly eye exam on HCQ    Plaquenil 1 tab a day    Allopurinol 100 mg every day    Labs q6-12 months, reviewed/stable    For gout flares: take anakinra daily shots x 5-7 days, if not enough, take the prednisone as 40-30-20-10 mg a day, each course x 3 days then go back to 5 mg a day        Return in 6 months in person      8/23/2024:    Urgent visit for L wrist erythema/pain/mild swelling after a fall in July, improved by taking prednisone 40 mg every day taper+anakinra x 3 doses (finished last wk) but not resolved, very painful, worried about participating in PT. Wishes not to get local steroid inj by ortho.    Based on presentation, seems like pseudogout (more likely)/gout flare of l wrist triggered by trauma.    Discussed plan of care. Recommend not to do PT till inflammation settles down.    Plan:    Percocet as needed      Plan:      Prednisone 60-40-20-10 mg a day, each course x 3 days then go back on 5 mg a day    Go back on anakinra inj daily x 3 more days    Watch the blood sugar on prednisone    Keep September appointment    9/19/2024:    L wrist flare has subsided, but still has residual pain, she considers getting a MRI done. Now her R foot hurting,  no imaging. Could be start of gout flare. Was advised to use anakinra.    Plan 9/19/2024:    Will continue with HCQ, yearly eye exam, prednisone 5 mg every day.    Start anakinra daily shots and use it till pain goes away    Let me know if you want L wrist MRI to be done    Return in about 3 months (video)      1/23/2025:    Doing well, no recent gout flares, L wrist pain improved after steroid inj.      Plan:    Labs in March 2025    Return in 6 months (in person)      Today 6/25/25:  Coming in with potential gout flare today. Based on history, swelling and erythema consistent with some component of gout, but while this has improved with Anakinra and prednisone it has not resolved completely. Given previous right wrist xray on file that shows significant osteoarthritis and physical exams showing focal tenderness and limited ROM, ongoing pain is likely related       TT 30 min was spent on date of the encounter doing chart review, history and exam, documentation and further activities as noted above. Any prior notes, outside records, laboratory results, and imaging studies were reviewed if relevant.        The longitudinal plan of care for the diagnosis(es)/condition(s) as documented were addressed during this visit. Due to the added complexity in care, I will continue to support Betty in the subsequent management and with ongoing continuity of care.      Zulma Lopez MD    No orders of the defined types were placed in this encounter.       HPI:   Betty Tee is a 81 year old WF with a history of primary Sjogren's syndrome, PMR, OA who presents for follow-up. She was diagnosed with Sjogren's syndrome in 2015 with borderline +RG, +SSA, and lip biopsy focus score of 1. Associated ILD and joint pain are prednisone-responsive which support the diagnosis.     10/1/2021: No gout flares since last visit so has not needed anakinra. She continues to have R hip pain related to severe OA but does not want to pursue  surgery. She recently started pool therapy and began taking CBD oil yesterday. Is hoping to pursue medical marijuana in the future as she does not want to be dependent on norco. Aside from hip pain has otherwise been doing well.        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 She is on prednisone 5 mg a day, she was given prednisone taper recently for possible L foot gout flare which helped. Did not flare again to need using anakinra shots. She has severe R hip pain. Saw ortho, had R hip MRI on 9/5/2020 which showed severe OA, R hip arthroplasty was recommended. She would prefer to delay R hip arthroplasty, had R hip inj on 2/24, NSAIDs are not allowed with CKD. Norco helps but she does not like to be dependent on it, takes it rarely. No L foot pain today. Her hip/leg pain/back pain is getting better, going to PT, doing exercises at home, last time elbow massage caused pain. Epidural shot helped. Has dry mouth. SOB is stable at baseline.  Last 3 wk has been hard, her doctor took her off gabapentin, sweat/chills and loss of appetite. Reason was being on other meds. Now off gabapentin. Had several gushing bowel explosion, could not make it to the bathroom. Random, unpredictable. No triggers. Has had this problem for years.     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    No gout flares, has not required anakinra, it is in the fridge.    Has breathing issue, ESOB is worse. O2 level is good. Saw PCP, was recomemnded to do follow up with cardiology.    Getting R hip steroid inj, last one was for bursitis. Still hurts crissy today. Saw pain mx yesterday. Got minimal exercises to help moving.    7/8/2022:    Sister Betty presents for follow up. At last visit,w as found to be anemic which explained her SOB. She had iron deficiency and B12 deficiency. Was found to have GIB. Had multiple GI visits, EGD, was treated with cauterization x 3  for GAVE. This AM, had brown stool not  black.    SOB is improved.    No gout flares since last visit.    Her major complaint is severe R hip pain, not responding to current pain mx, considers hip arthroplasty, but was told to wait till anemia gets fixed, also has to figure out her heart condition.      1/13/2023:     No gout flare today  Anakinra prn helps with flares  SOB is better  Hip OA pain is the same, considers arthroplasty.      7/21/2023:    Reviewed chart, labs, recent events.    -s/p R hip arthroplasty, recovered well  -Severe polyarticular gout flare last week requiring ER visit and prednisone 40-30-20-10 mg every day, each x 3 days then 5 mg a day, as anakinra qd inj was not enough to control it.  -better now    11/3/2023: Doing better than last visit, still flares with gout but not as bad, anakinra helps.    2/28/2024: Doing so well, no gout flares, no complaints.    8/23/2024:    Flare, severe L wrist pain/swelling after fall in July. No fracture. Prednisone+anakinra helped with decreased swelling, pain is severe.       9/19/2024:    -L writ swelling/pain is much better after anakinra, prednisone taper, but still has residual pain    - r foot just started to hurt      1/23/2025:    Doing well, no recent gout flares, l wrist pain finally got better after local steroid inj\\\    Today 6/25/25:  Stable Sjogrens. Right wrist swollen intermittently for a couple months, markedly swollen and much bigger than the left wrist when the swelling is at its peak. Associated with pain in wrist, shooting pain through the fingers, and sometimes up the arm as well. Now, swelling has decreased quite a bit and pain has improved to 3.5/10. Using an old prescription of methocarbamol and some tylenol, and the tylenol has helped.     Having ongoing dry mouth but helped by pilocarpine.       Review of Systems:   A comprehensive ROS was done. Positives are per HPI.      Active Medications:     Outpatient Medications Prior to Visit   Medication Sig Dispense Refill     acyclovir (ZOVIRAX) 400 MG tablet Take 1 tablet (400 mg) by mouth every 8 hours for 7 days. For herpes outbreak 21 tablet 0    acyclovir (ZOVIRAX) 5 % external ointment Apply topically as needed (6 times day PRN for outbreaks). As needed for outbreaks 15 g 2    albuterol (PROAIR HFA/PROVENTIL HFA/VENTOLIN HFA) 108 (90 Base) MCG/ACT inhaler Inhale 2 puffs into the lungs every 6 hours as needed for wheezing or shortness of breath      allopurinol (ZYLOPRIM) 100 MG tablet TAKE 1 TABLET BY MOUTH EVERY DAY 90 tablet 1    anakinra (KINERET) 100 MG/0.67ML SOSY injection Inject 0.67 mLs (100 mg) subcutaneously daily. 20.1 mL 3    aspirin (ASA) 81 MG EC tablet Take 1 tablet (81 mg) by mouth 2 times daily 60 tablet 0    atorvastatin (LIPITOR) 10 MG tablet Take 0.5 tablets (5 mg) by mouth daily. 45 tablet 1    azithromycin (ZITHROMAX) 250 MG tablet TAKE 1 TABLET BY MOUTH EVERY DAY 30 tablet 4    blood glucose (NO BRAND SPECIFIED) lancets standard Use to test blood sugar 3 times daily or as directed 100 Lancet 3    BREO ELLIPTA 100-25 MCG/ACT inhaler TAKE 1 PUFF BY MOUTH EVERY DAY 1 each 11    cevimeline (EVOXAC) 30 MG capsule Take 1 capsule (30 mg) by mouth 3 times daily as needed 270 capsule 3    Cholecalciferol (VITAMIN D3) 50 MCG (2000 UT) CAPS TAKE 100 MCG BY MOUTH DAILY (TAKE 2 TABLET (50 MCG) BY MOUTH DAILY - ORAL) (Patient taking differently: Take 1,000 Units by mouth daily. (Take 2 tablet (2000 units by mouth daily - Oral)) 180 capsule 3    COMPOUNDED NON-CONTROLLED SUBSTANCE (CMPD RX) - PHARMACY TO MIX COMPOUNDED MEDICATION Estriol 1 mg/g Apply small amount to finger and apply to inside vagina daily for 2 weeks then twice weekly Route: vaginally Dispense 30 grams 11 refills 30 g 11    CONTOUR NEXT TEST test strip USE TO TEST BLOOD SUGARS 3 TIMES DAILY 300 strip 1    cyanocobalamin (VITAMIN B-12) 1000 MCG tablet Take 1 tablet (1,000 mcg) by mouth three times a week      empagliflozin (JARDIANCE) 10 MG TABS tablet  Take 10 mg by mouth daily.      empagliflozin (JARDIANCE) 10 MG TABS tablet Take 1 tablet (10 mg) by mouth daily. 90 tablet 1    escitalopram (LEXAPRO) 20 MG tablet TAKE 1 TABLET BY MOUTH EVERY DAY 90 tablet 1    estradiol (VAGIFEM) 10 MCG TABS vaginal tablet Place 1 tablet (10 mcg) vaginally twice a week. 24 tablet 3    fluticasone (FLONASE) 50 MCG/ACT nasal spray SPRAY 1-2 SPRAYS INTO BOTH NOSTRILS DAILY AS NEEDED FOR ALLERGIES 16 mL 11    hydroxychloroquine (PLAQUENIL) 200 MG tablet TAKE 1 TABLET (200 MG) BY MOUTH DAILY GET ANNUAL EYE EXAMS FOR MONITORING AND FAX [phone] 90 tablet 3    insulin glargine (LANTUS SOLOSTAR) 100 UNIT/ML pen INJECT 24 UNITS SUBCUTANEOUSLY AT BEDTIME. 30 mL 0    insulin lispro (HUMALOG KWIKPEN) 100 UNIT/ML (1 unit dial) KWIKPEN Sliding scale insulin; tests BG three times day If BG <150, no insulin given If -200 take 8 units If -250 take 10 units If -300 take 12 units If -350 take 16 units If BG >350 take 20 units 15 mL 1    insulin pen needle (BD PEN NEEDLE JANE 2ND GEN) 32G X 4 MM miscellaneous USE TO TEST 4 TIMES A  each 1    ketoconazole (NIZORAL) 2 % external cream Apply topically 2 times daily as needed for itching 60 g 1    levocetirizine (XYZAL) 5 MG tablet TAKE 1 TABLET BY MOUTH EVERY DAY IN THE EVENING 90 tablet 1    lidocaine (LIDODERM) 5 % patch APPLY PATCH TO PAINFUL AREA FOR UP TO 12 H WITHIN A 24 H PERIOD. REMOVE AFTER 12 HOURS. 30 patch 2    loratadine (CLARITIN) 10 MG tablet TAKE 1 TABLET BY MOUTH EVERY DAY 90 tablet 2    methocarbamol (ROBAXIN) 750 MG tablet Take 1 tablet (750 mg) by mouth 3 times daily as needed for muscle spasms 90 tablet 3    metoprolol succinate ER (TOPROL XL) 25 MG 24 hr tablet Take 25 mg by mouth daily.      metoprolol succinate ER (TOPROL XL) 25 MG 24 hr tablet Take 1 tablet (25 mg) by mouth 2 times daily. 180 tablet 4    mirabegron (MYRBETRIQ) 25 MG 24 hr tablet Take 1 tablet (25 mg) by mouth daily. For  additional refills, please schedule a follow-up appointment. 90 tablet 3    mirabegron (MYRBETRIQ) 25 MG 24 hr tablet Take 1 tablet (25 mg) by mouth daily. 30 tablet 2    montelukast (SINGULAIR) 10 MG tablet TAKE 1 TABLET BY MOUTH EVERYDAY AT BEDTIME 90 tablet 3    MOUNJARO 15 MG/0.5ML SOAJ auto-injector pen Inject 0.5 mLs (15 mg) subcutaneously every 7 days. 6 mL 3    oxyCODONE-acetaminophen (PERCOCET) 5-325 MG tablet Take 1 tablet by mouth every 8 hours as needed for pain. 60 tablet 0    pantoprazole (PROTONIX) 40 MG EC tablet TAKE 1 TABLET (40 MG) BY MOUTH DAILY (REPLACES FAMOTIDINE- SHOULD STOP TAKING FAMOTIDINE) 90 tablet 1    polyethylene glycol (MIRALAX) 17 g packet Take 17 g by mouth daily 10 packet 0    potassium chloride ashwini ER (KLOR-CON M20) 20 MEQ CR tablet TAKE 2 TABLETS (40 MEQ) BY MOUTH EVERY MORNING AND 1 TABLET (20 MEQ) EVERY EVENING. 270 tablet 3    predniSONE (DELTASONE) 10 MG tablet TAKE 40mg daily for 3 days, then 30mg daily for 3 days, then 20mg daily for 3 days then 10mg daily for 3 days AS NEEDED FOR GOUT FLARES, THEN DROP BACK TO BASELINE 5 MG EVERY DAY. 30 tablet 3    predniSONE (DELTASONE) 5 MG tablet Take 1 tablet (5 mg) by mouth daily. 90 tablet 3    predniSONE (DELTASONE) 5 MG tablet Take 1 tablet (5 mg) by mouth daily. 90 tablet 1    torsemide (DEMADEX) 20 MG tablet Take 2 tablets (40 mg) by mouth every morning AND 1 tablet (20 mg) daily at 2 pm. 270 tablet 3    traZODone (DESYREL) 50 MG tablet Take 1-2 tablets ( mg) by mouth nightly as needed for sleep. 90 tablet 0     Facility-Administered Medications Prior to Visit   Medication Dose Route Frequency Provider Last Rate Last Admin    denosumab (PROLIA) injection 60 mg  60 mg Subcutaneous Q6 Months    60 mg at 01/17/25 1528     Allergies:   Allergies   Allergen Reactions    Amoxicillin-Pot Clavulanate Nausea and Vomiting    Amoxicillin-Pot Clavulanate     Codeine Nausea and Vomiting     PN: LW Reaction: HIVES    Penicillins  Nausea and Vomiting     PN: LW Reaction: GI Upset    Phenobarbital Itching    Seasonal Allergies         Past Medical History:  Alcohol abuse, in remission.   Allergic rhinitis.   Antiplatelet long-term use.   Atrial fibrillation.   Cardiomegaly.   Osteopenia.   Diverticulosis.   Benign essential hypertension.   GERD.   Insomnia.   Irregular heart beat.   Lumbago.   Major depressive disorder, recurrent episode, moderate.   ANGELICA.  Osteoarthrosis.   Sjogren's syndrome.   Sleep apnea.   Tobacco use disorder.   TMJ disorder.   RLS.  Major depressive disorder.   Hypertension.   Sciatica.   Colouterine fistula.   Left ventricular hypertrophy.   Breat fibroadenoma.   DVT.   Fatty liver disease, nonalcoholic.   Rib pain.   Neck mass.   Interstitial lung disease.   Acute and chronic respiratory failure with hypoxia.   Type II diabetes mellitus.   Hyperlipidemia.   Inflammatory arthritis.      Past Surgical History:  Back surgery.   Left breast biopsy.   Appendectomy.   Exploratory laparotomy.   Cardiac surgery.   Cholecystectomy.   Left colectomy.   Total abdominal hysterectomy with bilateral salpingo-oophorectomy.   Insert ureter stent.   Flexible sigmoidoscopy.   Ileostomy takedown.     Family History:   Mother: Positive for CAD, heart disease, hypertension, cerebrovascular disease, hyperlipidemia.   Father: Positive for alcohol/drug abuse, Alzheimer disease, dementia, hypertension, hyperlipidemia.   Sister: Positive for CAD, hypertension, and colorectal cancer.   Sister: Positive for hypertension and hyperlipidemia.   Sister: Positive for diabetes, lung cancer, asthma, and heart disease.   Brother: Positive for Parkinsonism and substance abuse.   Brother: Positive for hypertension, diverticulitis, and prostate cancer.   Daughter: Positive for breast cancer.        Social History:   She is a former smoker; quit in 2011. She has a history of alcohol use but stopped drinking in 1986.      Physical Exam:     LMP  (LMP Unknown)      Constitutional: NAD, very pleasant, sister Nghia present   Eyes: Normal EOM, conjunctiva, sclera  MSK: no synovitis  Skin: No rash  Neuro: grossly non-focal  Psych: Normal affect.    Images:  CT Chest w/o contrast (7/25/18):  Findings remain most consistent with small airway disease  and/or nonclassifiable interstitial lung disease and are not  significantly changed from the March 2017 comparison.    Per radiology.    Laboratory:       Latest Ref Rng & Units 3/12/2025    11:35 AM 5/21/2025    11:31 AM 6/25/2025    11:58 AM   RHEUM RESULTS   Albumin 3.5 - 5.2 g/dL 4.1      ALT 0 - 50 U/L 12   14    AST 0 - 45 U/L 19   18    Complement C3 81 - 157 mg/dL 122      Complement C4 13 - 39 mg/dL 26      Creatinine 0.51 - 0.95 mg/dL 1.07  1.15  1.04    CRP Inflammation <5.00 mg/L 12.60   10.60    GFR Estimate >60 mL/min/1.73m2 51  46  52    Hematocrit 35.0 - 47.0 % 39.8   43.4    Hemoglobin 11.7 - 15.7 g/dL 13.2   13.9    WBC 4.0 - 11.0 10e3/uL 8.3   9.7    RBC Count 3.80 - 5.20 10e6/uL 4.37   4.71    RDW 10.0 - 15.0 % 14.5   14.0    MCHC 31.5 - 36.5 g/dL 33.2   32.0    MCV 78 - 100 fL 91   92    Platelet Count 150 - 450 10e3/uL 179   188    Sed Rate 0 - 30 mm/hr 18   12        Rheumatoid Factor   Date Value Ref Range Status   07/24/2015 <20 <20 IU/mL Final   ,  ,   Cyclic Cit Pept IgG/IgA   Date Value Ref Range Status   07/24/2015 <20  Interpretation:  Negative   <20 UNITS Final   ,  ,   Scleroderma Antibody Scl-70 CECILIA IgG   Date Value Ref Range Status   07/24/2015  0.0 - 0.9 AI Final    <0.2  Negative   Antibody index (AI) values reflect qualitative changes in antibody   concentration that cannot be directly associated with clinical condition or   disease state.       SSA (Ro) (CECILIA) Antibody, IgG   Date Value Ref Range Status   07/24/2015 1.6 (H) 0.0 - 0.9 AI Final     Comment:     Positive   Antibody index (AI) values reflect qualitative changes in antibody   concentration that cannot be directly associated with  clinical condition or   disease state.       SSB (La) (CECILIA) Antibody, IgG   Date Value Ref Range Status   07/24/2015  0.0 - 0.9 AI Final    <0.2  Negative   Antibody index (AI) values reflect qualitative changes in antibody   concentration that cannot be directly associated with clinical condition or   disease state.       ,  ,   RG Screen by EIA   Date Value Ref Range Status   07/24/2015 <1.0  Interpretation:  Negative   <1.0 Final   ,  ,  ,  ,  ,  ,  ,  ,  ,  ,  ,  ,  ,  ,  ,  ,  ,  ,   Neutrophil Cytoplasmic IgG Antibody   Date Value Ref Range Status   07/24/2015   Final    <1:20  Reference range: <1:20  (Note)  The ANCA IFA is <1:20; therefore, no further testing will  be performed.  INTERPRETIVE INFORMATION: Anti-Neutrophil Cyto Ab, IgG  Neutrophil Cytoplasmic Antibodies (C-ANCA = granular  cytoplasmic staining, P-ANCA = perinuclear staining) are  found in the serum of over 90 percent of patients with  certain necrotizing systemic vasculitides, and usually in  less than 5 percent of patients with collagen vascular  disease or arthritis.  Performed by [facility],  [address] [place] [id number] [phone]  [email or web address], Burke Mckeon MD, Lab. Director       ,  ,  ,  ,  ,  ,  ,   IGG   Date Value Ref Range Status   07/24/2015 1320 695 - 1620 mg/dL Final   ,  ,  ,  ,  ,   Scleroderma Antibody Scl-70 CECILIA IgG   Date Value Ref Range Status   07/24/2015  0.0 - 0.9 AI Final    <0.2  Negative   Antibody index (AI) values reflect qualitative changes in antibody   concentration that cannot be directly associated with clinical condition or   disease state.          CBC RESULTS: 6/4/2021   Lab Test 06/04/21  1422   WBC 8.6   RBC 4.46   HGB 13.4   HCT 42.1   MCV 94   MCH 30.0   MCHC 31.8   RDW 15.5*        Last Comprehensive Metabolic Panel:  Sodium   Date Value Ref Range Status   05/21/2025 142 135 - 145 mmol/L Final   06/04/2021 137 133 - 144 mmol/L Final     Potassium   Date Value Ref Range Status    05/21/2025 4.1 3.4 - 5.3 mmol/L Final   08/23/2022 4.1 3.4 - 5.3 mmol/L Final   06/04/2021 4.0 3.4 - 5.3 mmol/L Final     Chloride   Date Value Ref Range Status   05/21/2025 102 98 - 107 mmol/L Final   08/23/2022 107 94 - 109 mmol/L Final   06/04/2021 106 94 - 109 mmol/L Final     Carbon Dioxide   Date Value Ref Range Status   06/04/2021 25 20 - 32 mmol/L Final     Carbon Dioxide (CO2)   Date Value Ref Range Status   05/21/2025 28 22 - 29 mmol/L Final   08/23/2022 22 20 - 32 mmol/L Final     Anion Gap   Date Value Ref Range Status   05/21/2025 12 7 - 15 mmol/L Final   08/23/2022 8 3 - 14 mmol/L Final   06/04/2021 6 3 - 14 mmol/L Final     Glucose   Date Value Ref Range Status   05/21/2025 245 (H) 70 - 99 mg/dL Final   08/23/2022 198 (H) 70 - 99 mg/dL Final   06/04/2021 142 (H) 70 - 99 mg/dL Final     GLUCOSE BY METER POCT   Date Value Ref Range Status   06/05/2023 259 (H) 70 - 99 mg/dL Final     Urea Nitrogen   Date Value Ref Range Status   05/21/2025 15.8 8.0 - 23.0 mg/dL Final   08/23/2022 17 7 - 30 mg/dL Final   06/04/2021 14 7 - 30 mg/dL Final     Creatinine   Date Value Ref Range Status   05/21/2025 1.15 (H) 0.51 - 0.95 mg/dL Final   06/04/2021 1.11 (H) 0.52 - 1.04 mg/dL Final     GFR Estimate   Date Value Ref Range Status   05/21/2025 46 (L) >60 mL/min/1.73m2 Final     Comment:     eGFR calculated using 2021 CKD-EPI equation.   06/04/2021 47 (L) >60 mL/min/[1.73_m2] Final     Comment:     Non  GFR Calc  Starting 12/18/2018, serum creatinine based estimated GFR (eGFR) will be   calculated using the Chronic Kidney Disease Epidemiology Collaboration   (CKD-EPI) equation.       Calcium   Date Value Ref Range Status   05/21/2025 9.8 8.8 - 10.4 mg/dL Final   06/04/2021 8.8 8.5 - 10.1 mg/dL Final       ESR 6/4/2021: 10.0    CRP 6/4/2021: 3.0    Uric acid 6/4/2021: 6.2    Component      Latest Ref Rng & Units 2/11/2022   WBC      4.0 - 11.0 10e3/uL 11.5 (H)   RBC Count      3.80 - 5.20 10e6/uL 3.47  (L)   Hemoglobin      11.7 - 15.7 g/dL 9.1 (L)   Hematocrit      35.0 - 47.0 % 30.2 (L)   MCV      78 - 100 fL 87   MCH      26.5 - 33.0 pg 26.2 (L)   MCHC      31.5 - 36.5 g/dL 30.1 (L)   RDW      10.0 - 15.0 % 15.1 (H)   Platelet Count      150 - 450 10e3/uL 283   % Neutrophils      % 76   % Lymphocytes      % 9   % Monocytes      % 11   % Eosinophils      % 2   % Basophils      % 1   % Immature Granulocytes      % 1   NRBCs per 100 WBC      <1 /100 0   Absolute Neutrophils      1.6 - 8.3 10e3/uL 8.9 (H)   Absolute Lymphocytes      0.8 - 5.3 10e3/uL 1.0   Absolute Monocytes      0.0 - 1.3 10e3/uL 1.2   Absolute Eosinophils      0.0 - 0.7 10e3/uL 0.2   Absolute Basophils      0.0 - 0.2 10e3/uL 0.1   Absolute Immature Granulocytes      <=0.4 10e3/uL 0.1   Absolute NRBCs      10e3/uL 0.0   Albumin Fraction      3.7 - 5.1 g/dL 3.9   Alpha 1 Fraction      0.2 - 0.4 g/dL 0.4   Alpha 2 Fraction      0.5 - 0.9 g/dL 0.8   Beta Fraction      0.6 - 1.0 g/dL 1.0   Gamma Fraction      0.7 - 1.6 g/dL 0.8   Monoclonal Peak      <=0.0 g/dL 0.0   ELP Interpretation:       Essentially normal electrophoretic pattern. No obvious monoclonal proteins seen. Pathologic significance requires clinical correlation. Meghan Brothers M.D., Ph.D.   Iron      35 - 180 ug/dL 17 (L)   Iron Binding Cap      240 - 430 ug/dL 354   Iron Saturation Index      15 - 46 % 5 (L)   Creatinine      0.52 - 1.04 mg/dL 1.00   GFR Estimate      >60 mL/min/1.73m2 56 (L)   % Retic      0.5 - 2.0 % 2.7 (H)   Absolute Retic      0.025 - 0.095 10e6/uL 0.090   ALT      0 - 50 U/L 17   Albumin      3.4 - 5.0 g/dL 3.3 (L)   AST      0 - 45 U/L 15   Sed Rate      0 - 30 mm/hr 36 (H)   CRP Inflammation      0.0 - 8.0 mg/L 12.5 (H)   Uric Acid      2.6 - 6.0 mg/dL 6.1 (H)   Immunofixation ELP       No monoclonal protein seen on immunofixation. Pathologic significance requires clinical correlation.  Meghan Brothers M.D., Ph.D.   Immunofix ELP Urine       No monoclonal protein seen  on immunofixation. Pathologic significance requires clinical correlation.  Meghan Brothers M.D., Ph.D.   Total Protein Serum for ELP      6.8 - 8.8 g/dL 6.9   N-Terminal Pro Bnp      0 - 450 pg/mL 239   Haptoglobin      32 - 197 mg/dL 149   Vitamin B12      193 - 986 pg/mL 162 (L)   Ferritin      8 - 252 ng/mL 14           Again, thank you for allowing me to participate in the care of your patient.      Sincerely,    Zulma Lopze MD

## 2025-06-25 NOTE — PATIENT INSTRUCTIONS
Take your medicines every day, as directed     Changes made today:    - Increase torsemide to 40 mg in the morning and 30 mg in the afternoon    - Increase potassium to 40 meq in the morning and 40 meq in the afternoon     Monitor Your Weight and Symptoms     Contact us if you:     Gain 2 pounds in one day or 5 pounds in one week  Feel more short of breath  Notice more leg swelling  Feel lightheadeded    Change your lifestyle     Limit Salt or Sodium:  2000 mg  Limit Fluids:  2000 mL or approximately 64 ounces  Eat a Heart Healthy Diet  Low in saturated fats  Stay Active:  Aim to move at least 150 minutes every  week            To Contact us     During Business Hours:  [phone], option # 1      After hours, weekends or holidays:   [phone], Option #4  Ask to speak to the On-Call Cardiologist. Inform them you are a CORE/heart failure patient at the [facility].       Use [employer] allows you to communicate directly with your heart team through secure messaging.  [email or web address] can be accessed any time on your phone, computer, or tablet.  If you need assistance, we'd be happy to help!             Keep your Heart Appointments:    - Labs (BMP) in 1-2 weeks     - RN phone call in 1-2 weeks to check on symptoms    - CORe in 3 months    - Dr. Rosa in December      Please consider attending our virtual support group which is held monthly. Please reach out to Siddharth at [phone] for more information if you are interested in attending.

## 2025-06-25 NOTE — NURSING NOTE
Chief Complaint   Patient presents with    Follow Up     Return CORE, HFpEF, labs prior       Vitals were taken, medications reconciled.    Jovanna Camejo, Visit Facilitator

## 2025-06-25 NOTE — PROGRESS NOTES
[facility]   [facility]      HPI:   Ms. Tee is a 84 year old female with medical history pertinent for interstitial lung disease (moderate restriction), Sjogren's syndrome, HTN, hx GIB, atrial fibrillation s/p Watchman, diverticulitis s/p colectomy 2011, and HFpEF. She [place] [facility] [place] follow up.     With regards to HFpEF, echo demonstrates mild LA enlargement, increase LV filling pressures, and by labs she has had an increased nt-bnp. She has been on spironolactone and torsemide. Earnest is a primary patient of Dr. Carmona but saw Dr. Moura 9/12/24- she was doing well at that time- metoprolol was decreased. At [facility] in March of 2025 she was switched to diltiazem, but had more a. Fib symptoms so went back on metoprolol. That was decreased to 5/15/25.     Today  She is feeling for fatigued. She still feels short of breaht with pretty limited wakling. Exercise tolerance has decreased. Mild LE edema. Thinks she has some abdominal edema. No orthopnea or PND. She does feel fatigued in the morning, it is nat on nights she wears the CPAP, but its not toally better when she does. She gets lightheadeded/dizzy when she turns her head really quickly and that has led to falls. No other lightheadedness. No chest pain. No palpitations.    She did take two allegras today which she feels is making her dizzy.     The fatigue does co-inside with her roommate falling and now living in the care unit.     Weight had been less than 180, now up to 184.     Cardiac Medications   - ASA 81 mg daily   - Atorvastatin 5 mg daily  - Metoprolol succinate 25 mg BID  - Torsemide 40/20 mg BID  - Potassium 40/20    PAST MEDICAL HISTORY:  Past Medical History:   Diagnosis Date    Alcohol abuse, in remission     Allergic rhinitis, cause unspecified     allegra helps when she takes it    Antiplatelet or antithrombotic long-term use     Atrial fibrillation (H)     in hosp in 11/11 after surgery w/ fluid overload     Cardiomegaly     LVH on stress echo- cardiac w/u at [facility]- neg CT scan for PE, neg stress echo in 8/06    Chest pain, unspecified     Congestive heart failure (H)     Coronary artery disease     Depressive disorder     Diabetes (H)     Disorder of bone and cartilage, unspecified     osteopenia (had been on prempro), improved on 6/06 dexa, stable dexa 11/10    Diverticulosis of colon (without mention of hemorrhage)     last episode yrs ago    Essential hypertension, benign     Follicular bronchiolitis (H)     associated with Sjogrens, dx by chest CT showing mosaic attenuation and air trapping    Gastro-oesophageal reflux disease     ILD (interstitial lung disease) (H)     associated with Sjogrens, also has mildly elevated IgG4, first noted on chest CT 2015 (mild changes) and also has small airways disease; ILD improved on follow up chest CT 2018.    Insomnia, unspecified     weaned off clonazepam    Irregular heart beat     Lumbago 07/2009    MRI with DJD, now seeing Dr. Cain for sciatic sx's    Major depressive disorder, recurrent episode, moderate (H)     Obstructive sleep apnea     uses cpap    Osteoarthrosis, unspecified whether generalized or localized, unspecified site     Sjogren's syndrome     + RG and SSA and lip bx    Sleep apnea     uses a cpap machine    Tobacco use disorder     chantix in 9/07, started again in 6/08, working       FAMILY HISTORY:  Family History   Problem Relation Age of Onset    C.A.D. Mother 63        MI- first at age 63    Heart Disease Mother     Hypertension Mother     Cerebrovascular Disease Mother     Hyperlipidemia Mother     Coronary Artery Disease Mother         MI-first at age 63    Other - See Comments Mother         cerebrovascular disease     Alcohol/Drug Father     Alzheimer Disease Father     Dementia Father     Hypertension Father     Hyperlipidemia Father     Substance Abuse Father     Diabetes Sister     Hypertension Sister     Hyperlipidemia Sister     Substance  Abuse Sister     Asthma Sister     C.A.D. Sister 52        Minor MI- age 50's    Heart Disease Sister     Hypertension Sister     Hypertension Sister     Cancer - colorectal Sister 48        Late 40's early 50's    Gastrointestinal Disease Sister         Diverticulitis    Lipids Sister     Lipids Sister     Diabetes Sister     Heart Disease Sister         CHF    Cancer Sister         lung, smoker    Substance Abuse Sister     Asthma Sister     Cancer Sister     Coronary Artery Disease Sister         minor MI-age 50's    Heart Disease Sister     Hypertension Sister     Hypertension Sister     Colon Cancer Sister     Diverticulitis Sister     Lung Cancer Sister     Heart Disease Sister         CHF    Diabetes Sister     Asthma Sister     Hypertension Brother     Hyperlipidemia Brother     Prostate Cancer Brother 74        Dx'd age 74    Gastrointestinal Disease Brother         Diverticulitis    Parkinsonism Brother     Substance Abuse Brother     Prostate Cancer Brother     Hyperlipidemia Brother     Hypertension Brother     Prostate Cancer Brother     Diverticulitis Brother     Parkinsonism Brother     Breast Cancer Daughter     Breast Cancer Daughter     Diabetes Other     Hypertension Other     Substance Abuse Sister     Asthma Sister     Diabetes Sister     Hypertension Sister     Hyperlipidemia Sister     Hypertension Brother     Hyperlipidemia Brother     Anesthesia Reaction No family hx of     Deep Vein Thrombosis (DVT) No family hx of        SOCIAL HISTORY:  Social History     Socioeconomic History    Marital status: Single    Number of children: 0    Years of education: Ed Spec De   Occupational History    Occupation: Professor     Employer: [employer]     Comment: [employer]- Education   Tobacco Use    Smoking status: Former     Packs/day: 0.50     Types: Cigarettes     Start date:      Quit date: 2011     Years since quittin.8    Smokeless tobacco: Never     Tobacco comments:     1/2 ppd   Vaping Use    Vaping status: Never Used   Substance and Sexual Activity    Alcohol use: No     Alcohol/week: 0.0 standard drinks of alcohol     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    Drug use: No    Sexual activity: Never   Other Topics Concern    Parent/sibling w/ CABG, MI or angioplasty before 65F 55M? Yes   Social History Narrative                   CURRENT MEDICATIONS:  Current Outpatient Medications   Medication Sig Dispense Refill    acyclovir (ZOVIRAX) 400 MG tablet Take 1 tablet (400 mg) by mouth every 8 hours for 7 days. For herpes outbreak 21 tablet 0    acyclovir (ZOVIRAX) 5 % external ointment Apply topically as needed (6 times day PRN for outbreaks). As needed for outbreaks 15 g 2    albuterol (PROAIR HFA/PROVENTIL HFA/VENTOLIN HFA) 108 (90 Base) MCG/ACT inhaler Inhale 2 puffs into the lungs every 6 hours as needed for wheezing or shortness of breath      allopurinol (ZYLOPRIM) 100 MG tablet TAKE 1 TABLET BY MOUTH EVERY DAY 90 tablet 1    anakinra (KINERET) 100 MG/0.67ML SOSY injection Inject 0.67 mLs (100 mg) subcutaneously daily. 20.1 mL 3    aspirin (ASA) 81 MG EC tablet Take 1 tablet (81 mg) by mouth 2 times daily 60 tablet 0    atorvastatin (LIPITOR) 10 MG tablet Take 0.5 tablets (5 mg) by mouth daily. 45 tablet 1    azithromycin (ZITHROMAX) 250 MG tablet TAKE 1 TABLET BY MOUTH EVERY DAY 30 tablet 4    blood glucose (NO BRAND SPECIFIED) lancets standard Use to test blood sugar 3 times daily or as directed 100 Lancet 3    BREO ELLIPTA 100-25 MCG/ACT inhaler TAKE 1 PUFF BY MOUTH EVERY DAY 1 each 11    cevimeline (EVOXAC) 30 MG capsule Take 1 capsule (30 mg) by mouth 3 times daily as needed (Patient taking differently: Take 30 mg by mouth daily.) 270 capsule 3    Cholecalciferol (VITAMIN D3) 50 MCG (2000 UT) CAPS TAKE 100 MCG BY MOUTH DAILY (TAKE 2 TABLET (50 MCG) BY MOUTH DAILY - ORAL) 180 capsule 3    COMPOUNDED NON-CONTROLLED SUBSTANCE (CMPD RX) - PHARMACY TO MIX  COMPOUNDED MEDICATION Estriol 1 mg/g Apply small amount to finger and apply to inside vagina daily for 2 weeks then twice weekly Route: vaginally Dispense 30 grams 11 refills 30 g 11    CONTOUR NEXT TEST test strip USE TO TEST BLOOD SUGARS 3 TIMES DAILY 300 strip 1    cyanocobalamin (VITAMIN B-12) 1000 MCG tablet Take 1 tablet (1,000 mcg) by mouth three times a week      empagliflozin (JARDIANCE) 10 MG TABS tablet Take 1 tablet (10 mg) by mouth daily. 90 tablet 1    escitalopram (LEXAPRO) 20 MG tablet TAKE 1 TABLET BY MOUTH EVERY DAY 90 tablet 1    fluticasone (FLONASE) 50 MCG/ACT nasal spray SPRAY 1-2 SPRAYS INTO BOTH NOSTRILS DAILY AS NEEDED FOR ALLERGIES 16 mL 11    hydroxychloroquine (PLAQUENIL) 200 MG tablet TAKE 1 TABLET (200 MG) BY MOUTH DAILY GET ANNUAL EYE EXAMS FOR MONITORING AND FAX [phone] 90 tablet 3    insulin glargine (LANTUS SOLOSTAR) 100 UNIT/ML pen INJECT 24 UNITS SUBCUTANEOUSLY AT BEDTIME. 30 mL 0    insulin lispro (HUMALOG KWIKPEN) 100 UNIT/ML (1 unit dial) KWIKPEN Sliding scale insulin; tests BG three times day If BG <150, no insulin given If -200 take 8 units If -250 take 10 units If -300 take 12 units If -350 take 16 units If BG >350 take 20 units 15 mL 1    insulin pen needle (BD PEN NEEDLE JANE 2ND GEN) 32G X 4 MM miscellaneous USE TO TEST 4 TIMES A  each 1    ketoconazole (NIZORAL) 2 % external cream Apply topically 2 times daily as needed for itching 60 g 1    levocetirizine (XYZAL) 5 MG tablet TAKE 1 TABLET BY MOUTH EVERY DAY IN THE EVENING 90 tablet 1    lidocaine (LIDODERM) 5 % patch APPLY PATCH TO PAINFUL AREA FOR UP TO 12 H WITHIN A 24 H PERIOD. REMOVE AFTER 12 HOURS. 30 patch 2    loratadine (CLARITIN) 10 MG tablet TAKE 1 TABLET BY MOUTH EVERY DAY 90 tablet 2    methocarbamol (ROBAXIN) 750 MG tablet Take 1 tablet (750 mg) by mouth 3 times daily as needed for muscle spasms 90 tablet 3    metoprolol succinate ER (TOPROL XL) 25 MG 24 hr tablet Take  1 tablet (25 mg) by mouth 2 times daily. 180 tablet 4    mirabegron (MYRBETRIQ) 25 MG 24 hr tablet Take 1 tablet (25 mg) by mouth daily. 30 tablet 2    montelukast (SINGULAIR) 10 MG tablet TAKE 1 TABLET BY MOUTH EVERYDAY AT BEDTIME 90 tablet 3    MOUNJARO 15 MG/0.5ML SOAJ auto-injector pen Inject 0.5 mLs (15 mg) subcutaneously every 7 days. 6 mL 3    oxyCODONE-acetaminophen (PERCOCET) 5-325 MG tablet Take 1 tablet by mouth every 8 hours as needed for pain. 60 tablet 0    pantoprazole (PROTONIX) 40 MG EC tablet TAKE 1 TABLET (40 MG) BY MOUTH DAILY (REPLACES FAMOTIDINE- SHOULD STOP TAKING FAMOTIDINE) 90 tablet 1    polyethylene glycol (MIRALAX) 17 g packet Take 17 g by mouth daily 10 packet 0    potassium chloride ashwini ER (KLOR-CON M20) 20 MEQ CR tablet Take 2 tablets (40 mEq) by mouth 2 times daily. 360 tablet 1    predniSONE (DELTASONE) 10 MG tablet TAKE 40mg daily for 3 days, then 30mg daily for 3 days, then 20mg daily for 3 days then 10mg daily for 3 days AS NEEDED FOR GOUT FLARES, THEN DROP BACK TO BASELINE 5 MG EVERY DAY. 30 tablet 3    torsemide (DEMADEX) 20 MG tablet Take 40 mg in the morning, and 30 mg in the PM. 325 tablet 3    traMADol (ULTRAM) 50 MG tablet Take 50 mg by mouth as needed.      traZODone (DESYREL) 50 MG tablet Take 1-2 tablets ( mg) by mouth nightly as needed for sleep. 90 tablet 0    empagliflozin (JARDIANCE) 10 MG TABS tablet Take 10 mg by mouth daily. (Patient not taking: Reported on 6/25/2025)      estradiol (VAGIFEM) 10 MCG TABS vaginal tablet Place 1 tablet (10 mcg) vaginally twice a week. (Patient not taking: Reported on 6/25/2025) 24 tablet 3    metoprolol succinate ER (TOPROL XL) 25 MG 24 hr tablet Take 25 mg by mouth daily. (Patient not taking: Reported on 6/25/2025)      mirabegron (MYRBETRIQ) 25 MG 24 hr tablet Take 1 tablet (25 mg) by mouth daily. For additional refills, please schedule a follow-up appointment. (Patient not taking: Reported on 6/25/2025) 90 tablet 3     predniSONE (DELTASONE) 5 MG tablet Take 1 tablet (5 mg) by mouth daily. (Patient not taking: Reported on 6/25/2025) 90 tablet 3    predniSONE (DELTASONE) 5 MG tablet Take 1 tablet (5 mg) by mouth daily. (Patient not taking: Reported on 6/25/2025) 90 tablet 1     Current Facility-Administered Medications   Medication Dose Route Frequency Provider Last Rate Last Admin    denosumab (PROLIA) injection 60 mg  60 mg Subcutaneous Q6 Months    60 mg at 01/17/25 1528       ROS:   Refer to HPI    EXAM:  /63 (BP Location: Right arm, Patient Position: Chair, Cuff Size: Adult Regular)   Pulse 56   Wt 83.9 kg (184 lb 14.4 oz)   LMP  (LMP Unknown)   SpO2 97%   BMI 30.77 kg/m     GENERAL: Appears comfortable, in no acute distress. Appears fatigued  HEENT: Eye symmetrical, no discharge or icterus bilaterally.   CV: RRR, +S1S2, no murmur, rub, or gallop. JVP ~8 mm hg cm.   RESPIRATORY: Respirations regular, even, and unlabored. Lungs CTA throughout.   GI: Soft and non distended   EXTREMITIES: trace peripheral edema.   NEUROLOGIC: All extremities are warm and well perfused  SKIN: No jaundice.     Labs, reviewed with patient in clinic today:  CBC RESULTS:  Lab Results   Component Value Date    WBC 9.7 06/25/2025    WBC 8.6 06/04/2021    RBC 4.71 06/25/2025    RBC 4.46 06/04/2021    HGB 13.9 06/25/2025    HGB 13.4 06/04/2021    HCT 43.4 06/25/2025    HCT 42.1 06/04/2021    MCV 92 06/25/2025    MCV 94 06/04/2021    MCH 29.5 06/25/2025    MCH 30.0 06/04/2021    MCHC 32.0 06/25/2025    MCHC 31.8 06/04/2021    RDW 14.0 06/25/2025    RDW 15.5 (H) 06/04/2021     06/25/2025     06/04/2021       CMP RESULTS:  Lab Results   Component Value Date     06/25/2025     06/04/2021    POTASSIUM 3.8 06/25/2025    POTASSIUM 4.1 08/23/2022    POTASSIUM 4.0 06/04/2021    CHLORIDE 103 06/25/2025    CHLORIDE 107 08/23/2022    CHLORIDE 106 06/04/2021    CO2 24 06/25/2025    CO2 22 08/23/2022    CO2 25 06/04/2021    ANIONGAP  13 06/25/2025    ANIONGAP 8 08/23/2022    ANIONGAP 6 06/04/2021     (H) 06/25/2025     (H) 06/05/2023     (H) 08/23/2022     (H) 06/04/2021    BUN 16.1 06/25/2025    BUN 17 08/23/2022    BUN 14 06/04/2021    CR 1.04 (H) 06/25/2025    CR 1.11 (H) 06/04/2021    GFRESTIMATED 52 (L) 06/25/2025    GFRESTIMATED 47 (L) 06/04/2021    GFRESTBLACK 54 (L) 06/04/2021    CLAUDY 9.4 06/25/2025    CLAUDY 8.8 06/04/2021    BILITOTAL 0.6 03/12/2025    BILITOTAL 0.6 12/04/2020    ALBUMIN 4.1 03/12/2025    ALBUMIN 3.2 (L) 08/23/2022    ALBUMIN 3.5 06/04/2021    ALKPHOS 69 03/12/2025    ALKPHOS 95 12/04/2020    ALT 14 06/25/2025    ALT 25 06/04/2021    AST 18 06/25/2025    AST 17 06/04/2021        INR RESULTS:  Lab Results   Component Value Date    INR 1.43 (H) 11/10/2022    INR 1.36 (H) 03/03/2020       Lab Results   Component Value Date    MAG 2.0 06/01/2023    MAG 2.0 04/11/2017     Lab Results   Component Value Date    NTBNPI 3,531 (H) 04/06/2017     Lab Results   Component Value Date    NTBNP 436 09/12/2024    NTBNP 176 08/27/2020       Cardiac Diagnostics:   9/12/24 ECHO  Interpretation Summary  Global and regional left ventricular function is normal with an EF of 60-65%.  Diastolic Doppler findings (E/E' ratio and/or other parameters) suggest left  ventricular filling pressures are increased.  Global right ventricular function is normal.  The inferior vena cava was normal in size with preserved respiratory  variability.     This study was compared with the study from 9/7/2023. There are no significant  changes.    9/9/2021 Echo  Interpretation Summary  Global and regional left ventricular function is normal with an EF of 55-60%.  The right ventricle is normal size. Global right ventricular function is  normal.  No significant valvular abnormalities.  IVC diameter >2.1 cm collapsing <50% with sniff suggests a high RA pressure  estimated at 15 mmHg or greater.  This study was compared with the study from  08/27/2020. The LA filling  pressures are higher. There are no significant changes in the biventricular  function or aortic calibers.        Assessment and Plan:   Ms. Tee is a 84 year old female with medical history pertinent for interstitial lung disease (moderate restriction), Sjogren's syndrome, HTN, atrial fibrillation s/p Watchman, diverticulitis s/p colectomy 2011, and HFpEF. She presents to [facility] for routine follow up.     More fatigue and KIMBLE in the last month. We will try a slight torsemide increase and monitor closely for dizziness. Some of the fatigue also sounds to be related to depression- she is already being managed by her pcp for this and is on a good dose of lexapro. She is going to discus further changes/increases/adjuct medications with her PCP. I will also add a TSH. I also recommended PCP follow-up if fatigue does not improve. Today is even worse than recently- but that is related to having taken two allegras just prior to this appointment.     # Chronic diastolic heart failure/HFpEF (EF 55-60%)  Risk factors include female gender, age, HTN, diabetes, sleep apnea, obesity and arrhythmia.   Stage C. NYHA Class III    Primary Cardiologist: Dr. Rosa; Last seen 4/2023 (Dr. Moura saw the patient for coverage 9/12/24)  BP: controlled   Fluid status: Possible mild hypervolemia- increase torsemide to 40 mg daily and 30 mg in the afternoon. Increase kcl to 40 meq BID.  Aldosterone antagonist: stopped previously d/t hypotension  SGLT2i: may consider starting in the future  Ischemia evaluation: Complete NM stress negative (6/2014)  ACEi/ARB/ARNI: n/a, no evidence for use in HFpEF  BB: n/a, no evidence for use in HFpEF, BB for AF, tried switching to diltazem, but she felt poorly on that  SCD prophylaxis: n/a, no evidence for use in HFpEF  NSAID use: contraindicated  Sleep apnea: CPAP compliant     Cardiac Medications   - ASA 81 mg daily   - Atorvastatin 5 mg daily  - Metoprolol succinate 25 mg  BID  - Torsemide 40/20 mg BID  - Potassium 40/20    # Paroxysmal Atrial fibrillation, s/p Watchman 9/2022  [id number] (age >75, HTN, CHF, gender)   - ASA 81 mg daily  - Continue metoprolol 25 mg BID (decreased spring of 2025 by EP for lightheadeness)  - Note she did not feel well on diltazem when we trialed that in place of metoprolol     # Primary prevention  - Continue statin and ASA       Follow-up  - Labs (BMP) in 1-2 weeks (increased torsemide)  - RN phone call in 1-2 weeks to check on symptoms (will decide at that point if we keep torsemide increased or go back to prior dose)  - CORe in 3 months  - Dr. Rosa in December Billing  - I managed 2+ stable chronic conditions  - I changed a prescription medication    The longitudinal plan of care for the diagnosis(es)/condition(s) as documented were addressed during this visit. Due to the added complexity in care, I will continue to support Betty in the subsequent management and with ongoing continuity of care.    Neela Jones PA-C  [facility] Failure

## 2025-06-25 NOTE — LETTER
6/25/2025      RE: Betty Tee  [address]  [place] [address]       Dear Colleague,    Thank you for the opportunity to participate in the care of your patient, Betty Tee, at the [facility]. Please see a copy of my visit note below.      [facility] Cardiology   [facility]      HPI:   Ms. Tee is a 84 year old female with medical history pertinent for interstitial lung disease (moderate restriction), Sjogren's syndrome, HTN, hx GIB, atrial fibrillation s/p Watchman, diverticulitis s/p colectomy 2011, and HFpEF. She presents to [facility] for routine follow up.     With regards to HFpEF, echo demonstrates mild LA enlargement, increase LV filling pressures, and by labs she has had an increased nt-bnp. She has been on spironolactone and torsemide. Shhe is a primary patient of Dr. Carmona but saw Dr. Moura 9/12/24- she was doing well at that time- metoprolol was decreased. At [facility] in March of 2025 she was switched to diltiazem, but had more a. Fib symptoms so went back on metoprolol. That was decreased to 5/15/25.     Today  She is feeling for fatigued. She still feels short of breaht with pretty limited wakling. Exercise tolerance has decreased. Mild LE edema. Thinks she has some abdominal edema. No orthopnea or PND. She does feel fatigued in the morning, it is nat on nights she wears the CPAP, but its not toally better when she does. She gets lightheadeded/dizzy when she turns her head really quickly and that has led to falls. No other lightheadedness. No chest pain. No palpitations.    She did take two allegras today which she feels is making her dizzy.     The fatigue does co-inside with her roommate falling and now living in the care unit.     Weight had been less than 180, now up to 184.     Cardiac Medications   - ASA 81 mg daily   - Atorvastatin 5 mg daily  - Metoprolol succinate 25 mg BID  -  Torsemide 40/20 mg BID  - Potassium 40/20    PAST MEDICAL HISTORY:  Past Medical History:   Diagnosis Date     Alcohol abuse, in remission      Allergic rhinitis, cause unspecified     allegra helps when she takes it     Antiplatelet or antithrombotic long-term use      Atrial fibrillation (H)     in hosp in 11/11 after surgery w/ fluid overload     Cardiomegaly     LVH on stress echo- cardiac w/u at [facility] ER- neg CT scan for PE, neg stress echo in 8/06     Chest pain, unspecified      Congestive heart failure (H)      Coronary artery disease      Depressive disorder      Diabetes (H)      Disorder of bone and cartilage, unspecified     osteopenia (had been on prempro), improved on 6/06 dexa, stable dexa 11/10     Diverticulosis of colon (without mention of hemorrhage)     last episode yrs ago     Essential hypertension, benign      Follicular bronchiolitis (H)     associated with Sjogrens, dx by chest CT showing mosaic attenuation and air trapping     Gastro-oesophageal reflux disease      ILD (interstitial lung disease) (H)     associated with Sjogrens, also has mildly elevated IgG4, first noted on chest CT 2015 (mild changes) and also has small airways disease; ILD improved on follow up chest CT 2018.     Insomnia, unspecified     weaned off clonazepam     Irregular heart beat      Lumbago 07/2009    MRI with DJD, now seeing Dr. Cain for sciatic sx's     Major depressive disorder, recurrent episode, moderate (H)      Obstructive sleep apnea     uses cpap     Osteoarthrosis, unspecified whether generalized or localized, unspecified site      Sjogren's syndrome     + RG and SSA and lip bx     Sleep apnea     uses a cpap machine     Tobacco use disorder     chantix in 9/07, started again in 6/08, working       FAMILY HISTORY:  Family History   Problem Relation Age of Onset     C.A.D. Mother 63        MI- first at age 63     Heart Disease Mother      Hypertension Mother      Cerebrovascular Disease Mother       Hyperlipidemia Mother      Coronary Artery Disease Mother         MI-first at age 63     Other - See Comments Mother         cerebrovascular disease      Alcohol/Drug Father      Alzheimer Disease Father      Dementia Father      Hypertension Father      Hyperlipidemia Father      Substance Abuse Father      Diabetes Sister      Hypertension Sister      Hyperlipidemia Sister      Substance Abuse Sister      Asthma Sister      C.A.D. Sister 52        Minor MI- age 50's     Heart Disease Sister      Hypertension Sister      Hypertension Sister      Cancer - colorectal Sister 48        Late 40's early 50's     Gastrointestinal Disease Sister         Diverticulitis     Lipids Sister      Lipids Sister      Diabetes Sister      Heart Disease Sister         CHF     Cancer Sister         lung, smoker     Substance Abuse Sister      Asthma Sister      Cancer Sister      Coronary Artery Disease Sister         minor MI-age 50's     Heart Disease Sister      Hypertension Sister      Hypertension Sister      Colon Cancer Sister      Diverticulitis Sister      Lung Cancer Sister      Heart Disease Sister         CHF     Diabetes Sister      Asthma Sister      Hypertension Brother      Hyperlipidemia Brother      Prostate Cancer Brother 74        Dx'd age 74     Gastrointestinal Disease Brother         Diverticulitis     Parkinsonism Brother      Substance Abuse Brother      Prostate Cancer Brother      Hyperlipidemia Brother      Hypertension Brother      Prostate Cancer Brother      Diverticulitis Brother      Parkinsonism Brother      Breast Cancer Daughter      Breast Cancer Daughter      Diabetes Other      Hypertension Other      Substance Abuse Sister      Asthma Sister      Diabetes Sister      Hypertension Sister      Hyperlipidemia Sister      Hypertension Brother      Hyperlipidemia Brother      Anesthesia Reaction No family hx of      Deep Vein Thrombosis (DVT) No family hx of        SOCIAL HISTORY:  Social History      Socioeconomic History     Marital status: Single     Number of children: 0     Years of education: Ed Spec De   Occupational History     Occupation: Professor     Employer: [employer]     Comment: [employer]- Education   Tobacco Use     Smoking status: Former     Packs/day: 0.50     Types: Cigarettes     Start date:      Quit date: 2011     Years since quittin.8     Smokeless tobacco: Never     Tobacco comments:      ppd   Vaping Use     Vaping status: Never Used   Substance and Sexual Activity     Alcohol use: No     Alcohol/week: 0.0 standard drinks of alcohol     Comment: In recovery beginning      Drug use: No     Sexual activity: Never   Other Topics Concern     Parent/sibling w/ CABG, MI or angioplasty before 65F 55M? Yes   Social History Narrative                   CURRENT MEDICATIONS:  Current Outpatient Medications   Medication Sig Dispense Refill     acyclovir (ZOVIRAX) 400 MG tablet Take 1 tablet (400 mg) by mouth every 8 hours for 7 days. For herpes outbreak 21 tablet 0     acyclovir (ZOVIRAX) 5 % external ointment Apply topically as needed (6 times day PRN for outbreaks). As needed for outbreaks 15 g 2     albuterol (PROAIR HFA/PROVENTIL HFA/VENTOLIN HFA) 108 (90 Base) MCG/ACT inhaler Inhale 2 puffs into the lungs every 6 hours as needed for wheezing or shortness of breath       allopurinol (ZYLOPRIM) 100 MG tablet TAKE 1 TABLET BY MOUTH EVERY DAY 90 tablet 1     anakinra (KINERET) 100 MG/0.67ML SOSY injection Inject 0.67 mLs (100 mg) subcutaneously daily. 20.1 mL 3     aspirin (ASA) 81 MG EC tablet Take 1 tablet (81 mg) by mouth 2 times daily 60 tablet 0     atorvastatin (LIPITOR) 10 MG tablet Take 0.5 tablets (5 mg) by mouth daily. 45 tablet 1     azithromycin (ZITHROMAX) 250 MG tablet TAKE 1 TABLET BY MOUTH EVERY DAY 30 tablet 4     blood glucose (NO BRAND SPECIFIED) lancets standard Use to test blood sugar 3 times daily or as directed 100  Lancet 3     BREO ELLIPTA 100-25 MCG/ACT inhaler TAKE 1 PUFF BY MOUTH EVERY DAY 1 each 11     cevimeline (EVOXAC) 30 MG capsule Take 1 capsule (30 mg) by mouth 3 times daily as needed (Patient taking differently: Take 30 mg by mouth daily.) 270 capsule 3     Cholecalciferol (VITAMIN D3) 50 MCG (2000 UT) CAPS TAKE 100 MCG BY MOUTH DAILY (TAKE 2 TABLET (50 MCG) BY MOUTH DAILY - ORAL) 180 capsule 3     COMPOUNDED NON-CONTROLLED SUBSTANCE (CMPD RX) - PHARMACY TO MIX COMPOUNDED MEDICATION Estriol 1 mg/g Apply small amount to finger and apply to inside vagina daily for 2 weeks then twice weekly Route: vaginally Dispense 30 grams 11 refills 30 g 11     CONTOUR NEXT TEST test strip USE TO TEST BLOOD SUGARS 3 TIMES DAILY 300 strip 1     cyanocobalamin (VITAMIN B-12) 1000 MCG tablet Take 1 tablet (1,000 mcg) by mouth three times a week       empagliflozin (JARDIANCE) 10 MG TABS tablet Take 1 tablet (10 mg) by mouth daily. 90 tablet 1     escitalopram (LEXAPRO) 20 MG tablet TAKE 1 TABLET BY MOUTH EVERY DAY 90 tablet 1     fluticasone (FLONASE) 50 MCG/ACT nasal spray SPRAY 1-2 SPRAYS INTO BOTH NOSTRILS DAILY AS NEEDED FOR ALLERGIES 16 mL 11     hydroxychloroquine (PLAQUENIL) 200 MG tablet TAKE 1 TABLET (200 MG) BY MOUTH DAILY GET ANNUAL EYE EXAMS FOR MONITORING AND FAX [phone] 90 tablet 3     insulin glargine (LANTUS SOLOSTAR) 100 UNIT/ML pen INJECT 24 UNITS SUBCUTANEOUSLY AT BEDTIME. 30 mL 0     insulin lispro (HUMALOG KWIKPEN) 100 UNIT/ML (1 unit dial) KWIKPEN Sliding scale insulin; tests BG three times day If BG <150, no insulin given If -200 take 8 units If -250 take 10 units If -300 take 12 units If -350 take 16 units If BG >350 take 20 units 15 mL 1     insulin pen needle (BD PEN NEEDLE JANE 2ND GEN) 32G X 4 MM miscellaneous USE TO TEST 4 TIMES A  each 1     ketoconazole (NIZORAL) 2 % external cream Apply topically 2 times daily as needed for itching 60 g 1     levocetirizine (XYZAL)  5 MG tablet TAKE 1 TABLET BY MOUTH EVERY DAY IN THE EVENING 90 tablet 1     lidocaine (LIDODERM) 5 % patch APPLY PATCH TO PAINFUL AREA FOR UP TO 12 H WITHIN A 24 H PERIOD. REMOVE AFTER 12 HOURS. 30 patch 2     loratadine (CLARITIN) 10 MG tablet TAKE 1 TABLET BY MOUTH EVERY DAY 90 tablet 2     methocarbamol (ROBAXIN) 750 MG tablet Take 1 tablet (750 mg) by mouth 3 times daily as needed for muscle spasms 90 tablet 3     metoprolol succinate ER (TOPROL XL) 25 MG 24 hr tablet Take 1 tablet (25 mg) by mouth 2 times daily. 180 tablet 4     mirabegron (MYRBETRIQ) 25 MG 24 hr tablet Take 1 tablet (25 mg) by mouth daily. 30 tablet 2     montelukast (SINGULAIR) 10 MG tablet TAKE 1 TABLET BY MOUTH EVERYDAY AT BEDTIME 90 tablet 3     MOUNJARO 15 MG/0.5ML SOAJ auto-injector pen Inject 0.5 mLs (15 mg) subcutaneously every 7 days. 6 mL 3     oxyCODONE-acetaminophen (PERCOCET) 5-325 MG tablet Take 1 tablet by mouth every 8 hours as needed for pain. 60 tablet 0     pantoprazole (PROTONIX) 40 MG EC tablet TAKE 1 TABLET (40 MG) BY MOUTH DAILY (REPLACES FAMOTIDINE- SHOULD STOP TAKING FAMOTIDINE) 90 tablet 1     polyethylene glycol (MIRALAX) 17 g packet Take 17 g by mouth daily 10 packet 0     potassium chloride ashwini ER (KLOR-CON M20) 20 MEQ CR tablet Take 2 tablets (40 mEq) by mouth 2 times daily. 360 tablet 1     predniSONE (DELTASONE) 10 MG tablet TAKE 40mg daily for 3 days, then 30mg daily for 3 days, then 20mg daily for 3 days then 10mg daily for 3 days AS NEEDED FOR GOUT FLARES, THEN DROP BACK TO BASELINE 5 MG EVERY DAY. 30 tablet 3     torsemide (DEMADEX) 20 MG tablet Take 40 mg in the morning, and 30 mg in the PM. 325 tablet 3     traMADol (ULTRAM) 50 MG tablet Take 50 mg by mouth as needed.       traZODone (DESYREL) 50 MG tablet Take 1-2 tablets ( mg) by mouth nightly as needed for sleep. 90 tablet 0     empagliflozin (JARDIANCE) 10 MG TABS tablet Take 10 mg by mouth daily. (Patient not taking: Reported on 6/25/2025)        estradiol (VAGIFEM) 10 MCG TABS vaginal tablet Place 1 tablet (10 mcg) vaginally twice a week. (Patient not taking: Reported on 6/25/2025) 24 tablet 3     metoprolol succinate ER (TOPROL XL) 25 MG 24 hr tablet Take 25 mg by mouth daily. (Patient not taking: Reported on 6/25/2025)       mirabegron (MYRBETRIQ) 25 MG 24 hr tablet Take 1 tablet (25 mg) by mouth daily. For additional refills, please schedule a follow-up appointment. (Patient not taking: Reported on 6/25/2025) 90 tablet 3     predniSONE (DELTASONE) 5 MG tablet Take 1 tablet (5 mg) by mouth daily. (Patient not taking: Reported on 6/25/2025) 90 tablet 3     predniSONE (DELTASONE) 5 MG tablet Take 1 tablet (5 mg) by mouth daily. (Patient not taking: Reported on 6/25/2025) 90 tablet 1     Current Facility-Administered Medications   Medication Dose Route Frequency Provider Last Rate Last Admin     denosumab (PROLIA) injection 60 mg  60 mg Subcutaneous Q6 Months    60 mg at 01/17/25 1528       ROS:   Refer to HPI    EXAM:  /63 (BP Location: Right arm, Patient Position: Chair, Cuff Size: Adult Regular)   Pulse 56   Wt 83.9 kg (184 lb 14.4 oz)   LMP  (LMP Unknown)   SpO2 97%   BMI 30.77 kg/m     GENERAL: Appears comfortable, in no acute distress. Appears fatigued  HEENT: Eye symmetrical, no discharge or icterus bilaterally.   CV: RRR, +S1S2, no murmur, rub, or gallop. JVP ~8 mm hg cm.   RESPIRATORY: Respirations regular, even, and unlabored. Lungs CTA throughout.   GI: Soft and non distended   EXTREMITIES: trace peripheral edema.   NEUROLOGIC: All extremities are warm and well perfused  SKIN: No jaundice.     Labs, reviewed with patient in clinic today:  CBC RESULTS:  Lab Results   Component Value Date    WBC 9.7 06/25/2025    WBC 8.6 06/04/2021    RBC 4.71 06/25/2025    RBC 4.46 06/04/2021    HGB 13.9 06/25/2025    HGB 13.4 06/04/2021    HCT 43.4 06/25/2025    HCT 42.1 06/04/2021    MCV 92 06/25/2025    MCV 94 06/04/2021    MCH 29.5 06/25/2025     MCH 30.0 06/04/2021    MCHC 32.0 06/25/2025    MCHC 31.8 06/04/2021    RDW 14.0 06/25/2025    RDW 15.5 (H) 06/04/2021     06/25/2025     06/04/2021       CMP RESULTS:  Lab Results   Component Value Date     06/25/2025     06/04/2021    POTASSIUM 3.8 06/25/2025    POTASSIUM 4.1 08/23/2022    POTASSIUM 4.0 06/04/2021    CHLORIDE 103 06/25/2025    CHLORIDE 107 08/23/2022    CHLORIDE 106 06/04/2021    CO2 24 06/25/2025    CO2 22 08/23/2022    CO2 25 06/04/2021    ANIONGAP 13 06/25/2025    ANIONGAP 8 08/23/2022    ANIONGAP 6 06/04/2021     (H) 06/25/2025     (H) 06/05/2023     (H) 08/23/2022     (H) 06/04/2021    BUN 16.1 06/25/2025    BUN 17 08/23/2022    BUN 14 06/04/2021    CR 1.04 (H) 06/25/2025    CR 1.11 (H) 06/04/2021    GFRESTIMATED 52 (L) 06/25/2025    GFRESTIMATED 47 (L) 06/04/2021    GFRESTBLACK 54 (L) 06/04/2021    CLAUDY 9.4 06/25/2025    CLAUDY 8.8 06/04/2021    BILITOTAL 0.6 03/12/2025    BILITOTAL 0.6 12/04/2020    ALBUMIN 4.1 03/12/2025    ALBUMIN 3.2 (L) 08/23/2022    ALBUMIN 3.5 06/04/2021    ALKPHOS 69 03/12/2025    ALKPHOS 95 12/04/2020    ALT 14 06/25/2025    ALT 25 06/04/2021    AST 18 06/25/2025    AST 17 06/04/2021        INR RESULTS:  Lab Results   Component Value Date    INR 1.43 (H) 11/10/2022    INR 1.36 (H) 03/03/2020       Lab Results   Component Value Date    MAG 2.0 06/01/2023    MAG 2.0 04/11/2017     Lab Results   Component Value Date    NTBNPI 3,531 (H) 04/06/2017     Lab Results   Component Value Date    NTBNP 436 09/12/2024    NTBNP 176 08/27/2020       Cardiac Diagnostics:   9/12/24 ECHO  Interpretation Summary  Global and regional left ventricular function is normal with an EF of 60-65%.  Diastolic Doppler findings (E/E' ratio and/or other parameters) suggest left  ventricular filling pressures are increased.  Global right ventricular function is normal.  The inferior vena cava was normal in size with preserved  respiratory  variability.     This study was compared with the study from 9/7/2023. There are no significant  changes.    9/9/2021 Echo  Interpretation Summary  Global and regional left ventricular function is normal with an EF of 55-60%.  The right ventricle is normal size. Global right ventricular function is  normal.  No significant valvular abnormalities.  IVC diameter >2.1 cm collapsing <50% with sniff suggests a high RA pressure  estimated at 15 mmHg or greater.  This study was compared with the study from 08/27/2020. The LA filling  pressures are higher. There are no significant changes in the biventricular  function or aortic calibers.        Assessment and Plan:   Ms. Tee is a 84 year old female with medical history pertinent for interstitial lung disease (moderate restriction), Sjogren's syndrome, HTN, atrial fibrillation s/p Watchman, diverticulitis s/p colectomy 2011, and HFpEF. She presents to [facility] for routine follow up.     More fatigue and KIMBLE in the last month. We will try a slight torsemide increase and monitor closely for dizziness. Some of the fatigue also sounds to be related to depression- she is already being managed by her pcp for this and is on a good dose of lexapro. She is going to discus further changes/increases/adjuct medications with her PCP. I will also add a TSH. I also recommended PCP follow-up if fatigue does not improve. Today is even worse than recently- but that is related to having taken two allegras just prior to this appointment.     # Chronic diastolic heart failure/HFpEF (EF 55-60%)  Risk factors include female gender, age, HTN, diabetes, sleep apnea, obesity and arrhythmia.   Stage C. NYHA Class III    Primary Cardiologist: Dr. Rosa; Last seen 4/2023 (Dr. Moura saw the patient for coverage 9/12/24)  BP: controlled   Fluid status: Possible mild hypervolemia- increase torsemide to 40 mg daily and 30 mg in the afternoon. Increase kcl to 40 meq BID.  Aldosterone  antagonist: stopped previously d/t hypotension  SGLT2i: may consider starting in the future  Ischemia evaluation: Complete NM stress negative (6/2014)  ACEi/ARB/ARNI: n/a, no evidence for use in HFpEF  BB: n/a, no evidence for use in HFpEF, BB for AF, tried switching to diltazem, but she felt poorly on that  SCD prophylaxis: n/a, no evidence for use in HFpEF  NSAID use: contraindicated  Sleep apnea: CPAP compliant     Cardiac Medications   - ASA 81 mg daily   - Atorvastatin 5 mg daily  - Metoprolol succinate 25 mg BID  - Torsemide 40/20 mg BID  - Potassium 40/20    # Paroxysmal Atrial fibrillation, s/p Watchman 9/2022  [id number] (age >75, HTN, CHF, gender)   - ASA 81 mg daily  - Continue metoprolol 25 mg BID (decreased spring of 2025 by EP for lightheadeness)  - Note she did not feel well on diltazem when we trialed that in place of metoprolol     # Primary prevention  - Continue statin and ASA       Follow-up  - Labs (BMP) in 1-2 weeks (increased torsemide)  - RN phone call in 1-2 weeks to check on symptoms (will decide at that point if we keep torsemide increased or go back to prior dose)  - CORe in 3 months  - Dr. Rosa in December Billing  - I managed 2+ stable chronic conditions  - I changed a prescription medication    The longitudinal plan of care for the diagnosis(es)/condition(s) as documented were addressed during this visit. Due to the added complexity in care, I will continue to support Betty in the subsequent management and with ongoing continuity of care.    Neela Jones PA-C  Advance Heart Failure            Please do not hesitate to contact me if you have any questions/concerns.     Sincerely,     Neela Jones PA-C

## 2025-06-25 NOTE — PATIENT INSTRUCTIONS
Follow up with Dr. Nikko jolly    Try tramadol 1 tab every 12 hours as needed for severe pain    Try anakinra every other day x next month     Prednisone taper as needed    Keep Nov appointment

## 2025-06-25 NOTE — NURSING NOTE
"Chief Complaint   Patient presents with    RECHECK       Vital signs:      BP: 128/65 Pulse: 56     SpO2: 97 %       Weight: 83.5 kg (184 lb)  Estimated body mass index is 30.62 kg/m  as calculated from the following:    Height as of 5/21/25: 1.651 m (5' 5\").    Weight as of this encounter: 83.5 kg (184 lb).      Rosalina Coffman CMA   6/25/2025 3:18 PM    "

## 2025-06-26 NOTE — RESULT ENCOUNTER NOTE
-Your basic metabolic panel (which includes electrolyte levels, blood sugar level and kidney function tests) looks stable.    Best,   Lev Tristan MD

## 2025-06-27 ENCOUNTER — MYC MEDICAL ADVICE (OUTPATIENT)
Dept: FAMILY MEDICINE | Facility: CLINIC | Age: 85
End: 2025-06-27
Payer: COMMERCIAL

## 2025-06-27 DIAGNOSIS — B37.2 CUTANEOUS CANDIDIASIS: ICD-10-CM

## 2025-06-30 RX ORDER — KETOCONAZOLE 20 MG/G
CREAM TOPICAL 2 TIMES DAILY PRN
Qty: 60 G | Refills: 1 | Status: SHIPPED | OUTPATIENT
Start: 2025-06-30

## 2025-06-30 NOTE — TELEPHONE ENCOUNTER
CW,  Please see below "[employer]hart message and advise.  Could advise visit to discuss?  Thanks,  Evonne HYATT RN

## 2025-06-30 NOTE — TELEPHONE ENCOUNTER
Yes, this is what she's used on/off since 12/16 for vaginal/cutaneous yeast.  Will send in refill for her to continue to use for flares.  Thanks!  CW

## 2025-07-02 ENCOUNTER — LAB (OUTPATIENT)
Dept: LAB | Facility: CLINIC | Age: 85
End: 2025-07-02
Payer: COMMERCIAL

## 2025-07-02 LAB
ANION GAP SERPL CALCULATED.3IONS-SCNC: 13 MMOL/L (ref 7–15)
BUN SERPL-MCNC: 18.7 MG/DL (ref 8–23)
CALCIUM SERPL-MCNC: 9.7 MG/DL (ref 8.8–10.4)
CHLORIDE SERPL-SCNC: 101 MMOL/L (ref 98–107)
CREAT SERPL-MCNC: 1.35 MG/DL (ref 0.51–0.95)
EGFRCR SERPLBLD CKD-EPI 2021: 38 ML/MIN/1.73M2
GLUCOSE SERPL-MCNC: 202 MG/DL (ref 70–99)
HCO3 SERPL-SCNC: 26 MMOL/L (ref 22–29)
POTASSIUM SERPL-SCNC: 4.1 MMOL/L (ref 3.4–5.3)
SODIUM SERPL-SCNC: 140 MMOL/L (ref 135–145)

## 2025-07-03 ENCOUNTER — VIRTUAL VISIT (OUTPATIENT)
Dept: PHARMACY | Facility: CLINIC | Age: 85
End: 2025-07-03
Payer: COMMERCIAL

## 2025-07-03 DIAGNOSIS — F33.1 MAJOR DEPRESSIVE DISORDER, RECURRENT EPISODE, MODERATE (H): ICD-10-CM

## 2025-07-03 DIAGNOSIS — R53.83 FATIGUE, UNSPECIFIED TYPE: ICD-10-CM

## 2025-07-03 DIAGNOSIS — Z79.4 TYPE 2 DIABETES MELLITUS WITH HYPERGLYCEMIA, WITH LONG-TERM CURRENT USE OF INSULIN (H): ICD-10-CM

## 2025-07-03 DIAGNOSIS — I48.0 PAROXYSMAL ATRIAL FIBRILLATION (H): ICD-10-CM

## 2025-07-03 DIAGNOSIS — I50.32 CHRONIC HEART FAILURE WITH PRESERVED EJECTION FRACTION (H): Primary | ICD-10-CM

## 2025-07-03 DIAGNOSIS — I10 ESSENTIAL HYPERTENSION WITH GOAL BLOOD PRESSURE LESS THAN 140/90: ICD-10-CM

## 2025-07-03 DIAGNOSIS — E11.65 TYPE 2 DIABETES MELLITUS WITH HYPERGLYCEMIA, WITH LONG-TERM CURRENT USE OF INSULIN (H): ICD-10-CM

## 2025-07-03 RX ORDER — INSULIN GLARGINE 100 [IU]/ML
26 INJECTION, SOLUTION SUBCUTANEOUS AT BEDTIME
Status: SHIPPED
Start: 2025-07-03

## 2025-07-03 NOTE — PATIENT INSTRUCTIONS
"Recommendations from today's MTM visit:                                                    MTM (medication therapy management) is a service provided by a clinical pharmacist designed to help you get the most of out of your medicines.   Today we reviewed what your medicines are for, how to know if they are working, that your medicines are safe and how to make your medicine regimen as easy as possible.      Increase Lantus from 24 to 26 units once daily  BMP ordered for completion next week.   Continue torsemide 40mg in the AM and 20mg in the afternoon and potassium 40mEq twice daily. Sabrina or the cardiology team will reach out with next steps once your lab work is back.    Follow-up: video MTM visit 7/29 at 1:30 PM.     It was great speaking with you today.  I value your experience and would be very thankful for your time in providing feedback in our clinic survey. In the next few days, you may receive an email or text message from [employer] with a link to a survey related to your  clinical pharmacist.\"     To schedule another MTM appointment, please call the clinic directly or you may call the MTM scheduling line at [phone] or toll-free at [phone].     My Clinical Pharmacist's contact information:                                                      Please feel free to contact me with any questions or concerns you have.      Sabrina Meek, Pharm.D., M.B.A., [facility]  MTM Pharmacist, [facility]  Pager: [phone]      "

## 2025-07-03 NOTE — Clinical Note
Tobi!   I saw Betty today for a follow-up on her diabetes. Sounds like they aren't sure if she ever increased her diuretic dose and aren't sure what dose she was taking at the time of the last BMP. So I ordered one for next week and asked them to stick with the same dose they had been taking (40mg AM/20mg PM).  They DID increase potassium. Since potassium was still WNL, I did not have them change this.   Sabrina Meek, PharmD, JAMES, BCACP MTM Pharmacist, [facility]

## 2025-07-03 NOTE — PROGRESS NOTES
Medication Therapy Management (MTM) Encounter    ASSESSMENT:                            Medication Adherence/Access: No issues identified.    Diabetes   A1c near goal <8%. Fastin glucose slightly high, pre-dinner readings at goal.  May respond to slight increase in Lantus.   UACR not at goal, but has not been rechecked since starting Jardiance. ACE/ARB has not been tolerated to due low BP. No changes today.  Due for foot exam at upcoming appointment. Eye exam and vaccines are UTD. Appropriately on statin and aspirin.     Heart Failure   (HFpEF)/Hypertension/Afib  Unclear what dose of diuretic she was taking prior to the last set of labs.   Has returned to original diuretic dosing for at least a week now, but has continued increased potassium dose. However, potassium normal on last check. Will not change dose now, but given change in SCr and increased K+ dose, will recheck BMP next week.   Unclear if Jardiance is a contributing factor as it was started about 2 months ago with stable BMP until now.      Mental Health   Depression and Fatigue  Will continue to monitor symptoms.  If they don't improve, could consider decreasing the dose of some of her fatigue-causing meds if able (trazodone, methocarbamol, levocetrizine)    PLAN:                            Increase Lantus from 24 to 26 units once daily  BMP ordered for completion next week.   Continue torsemide 40mg in the AM and 20mg in the afternoon and potassium 40mEq twice daily. Sabrina or the cardiology team will reach out with next steps once your lab work is back.    Follow-up: video MT visit 7/29 at 1:30 PM.     SUBJECTIVE/OBJECTIVE:                          Betty Tee is a 85 year old female contacted via secure video for a follow-up visit.       Reason for visit: follow-up on blood sugars.    Allergies/ADRs: Reviewed in chart  Past Medical History: Reviewed in chart  Tobacco: She reports that she quit smoking about 13 years ago. Her smoking use included  cigarettes. She started smoking about 55 years ago. She has a 20.8 pack-year smoking history. She has never been exposed to tobacco smoke. She has never used smokeless tobacco.  Alcohol: not currently using    Medication Adherence/Access: No concerns identified today. Has been working really hard at remembering all Novolog doses.     Diabetes   Aspirin 81mg daily  Mounjaro 15 mg weekly  Lantus 24 units daily  Jardiance 10mg daily (started at last MTM visit on  mostly for HF and CKD benefit)  Novolog (or humalog) sliding scale below twice daily (only eats twice daily), usually 8-10 units at a time, no use in the morning.   If -200 take 8 units  If -250 take 10 units  If -300 take 12 units  If -350 take 16 units  If BG >350 take 20 units    Diabetes symptoms: numbness some tingling in her feet at night, but works them out and feels that this helps. Denies s/sx of hypo/hyperglycemia.     Blood sugar monitoring: two time(s) daily; Ranges: (patient reported)     Fastin, pre-dinner 164   Fastin, pre-dinner 150   Fastin, pre-dinner 160   Thurs 7/3 Fastin    Eye exam is up to date - has follow-up scheduled 10/1/25       Heart Failure   (HFpEF)/Hypertension/Afib  Metoprolol ER 25mg twice daily   Torsemide 40 mg every morning and 20 mg in the afternoon - see details below.   Jardiance 10mg daily (started at last MTM visit on  mostly for HF and CKD benefit)  Potassium 40mEq twice daily (as 2-20mEq tabs twice daily)     She describes some dizziness with bending and moving quickly. Dizziness hasn't gone away, but has improved a bit. She is being more careful when she stands/turns, but it's still there.  Has not been monitoring daily weights., but stable at clinic visits. Denies SOB, swelling.    Saw cardiology  and torsemide was increased to 40mg in the morning and 30mg in the afternoon.  Potassium was increased to 40mEq twice daily.   Betty and Nghia believe that the dose of potassium was definitely increased, but they aren't sure about toresmide.  Betty may have taken the increase for about a week, or not at all. Definitely for the last week she has been on 40mg in the AM and 30 mg in the afternoon.     Result note from 7/2 BMP (Neela Jones PA-C) notes: Cr slightly worse on the increaed torsemide. Likely will need to go back to her old dose unless she is feeling significantly better in which case we can repeat BMP early next week to get more of a trend.     Renal function trending:   Jardiance started 5/13  BMP prior to start on 3/12/25 showed a Scr of 1.07 and eGFR of 51  BMP on 5/21 showed Scr 1.15, eGFR 46 (about a 10% decrease in eGFR, below 30% change cut off so jardiance was continued)  BMP 6/25 showed SCr 1.04, eGFR 52 - much closer to baseline  BMP 7/2 - SCr 1.35 and eGFR 38    Reports she is doing well with water intake.     Betty attributes her fatigue (one of the reasons for increasing diuretics) to depression - see below.         Mental Health   Depression and Fatigue  Escitalopram 20 mg once daily  Trazodone 1-2 tablets at night for sleep    Patient reports no current medication side effects  Feels that increase in fatigue is due to an increase in depression. Her housemate of 55 years fell and is likely not going to be coming back home. This is very troubling to her - dealing with the loss/change. Not interested in making changes to her mental health medications. Not interested in meeting with a therapist. Just feels she needs to work through this on her own.       ----------------  I spent 24 minutes with this patient today. All changes were made via collaborative practice agreement with Ilene Tristan MD. A copy of the visit note was provided to the patient's provider(s).    A summary of these recommendations was sent via [employer].    Sabrina Meek, MarcosD, JAMES, BCACP  MTM Pharmacist, [facility]      Telemedicine Visit Details  The patient's medications can be safely assessed via a telemedicine encounter.  Type of service:  Video Conference via [employer]  Originating Location (pt. Location): Home    Distant Location (provider location):  On-site  Joined the call at 7/3/2025, 3:30:58 pm.  Left the call at 7/3/2025, 3:54:49 pm.  You were on the call for 23 minutes 51 seconds.     Medication Therapy Recommendations  (HFpEF) heart failure with preserved ejection fraction (H)   1 Current Medication: torsemide (DEMADEX) 20 MG tablet (Discontinued)   Current Medication Sig: Take 40 mg in the morning, and 30 mg in the PM.   Rationale: Medication requires monitoring - Needs additional monitoring - Safety   Recommendation: Order Lab   Status: Accepted per CPA   Identified Date: 7/3/2025 Completed Date: 7/3/2025         Type 2 diabetes mellitus with hyperglycemia, with long-term current use of insulin (H)   1 Current Medication: insulin glargine (LANTUS SOLOSTAR) 100 UNIT/ML pen   Current Medication Sig: Inject 26 Units subcutaneously at bedtime.   Rationale: Dose too low - Dosage too low - Effectiveness   Recommendation: Increase Dose   Status: Accepted per CPA   Identified Date: 7/3/2025 Completed Date: 7/3/2025

## 2025-07-03 NOTE — TELEPHONE ENCOUNTER
Date: 7/3/2025    Time of Call: 2:10 PM     Diagnosis:  heart failure      [ VORB ] Ordering provider: JAKE Mneg    Order: Cr slightly worse on the increaed torsemide. Likely will need to go back to her old dose unless she is feeling significantly better in which case we can repeat BMP early next week to get more of a trend.     return torsemide 40 mg in morning, 20 mg in at 2 pm      Order received by: Flor Velasquez RN       Follow-up/additional notes: Nghia reports that Betty is not feeling better, returned to old dose.     Reviewed with JAKE Meng, who recommended BMP in 2 weeks.  Called Nghia back to review, she states Dr Bran's nurse was just there and they discovered that sister Sita had probably only taken the extra torsemide for 1 week, so they were going to get labs next week.

## 2025-07-05 ENCOUNTER — HEALTH MAINTENANCE LETTER (OUTPATIENT)
Age: 85
End: 2025-07-05

## 2025-07-09 ENCOUNTER — LAB (OUTPATIENT)
Dept: LAB | Facility: CLINIC | Age: 85
End: 2025-07-09
Payer: COMMERCIAL

## 2025-07-09 DIAGNOSIS — G47.00 INSOMNIA, UNSPECIFIED TYPE: ICD-10-CM

## 2025-07-09 DIAGNOSIS — E78.5 HYPERLIPIDEMIA LDL GOAL <100: ICD-10-CM

## 2025-07-09 DIAGNOSIS — Z79.4 TYPE 2 DIABETES MELLITUS WITH HYPERGLYCEMIA, WITH LONG-TERM CURRENT USE OF INSULIN (H): ICD-10-CM

## 2025-07-09 DIAGNOSIS — E11.65 TYPE 2 DIABETES MELLITUS WITH HYPERGLYCEMIA, WITH LONG-TERM CURRENT USE OF INSULIN (H): ICD-10-CM

## 2025-07-09 DIAGNOSIS — I50.32 CHRONIC HEART FAILURE WITH PRESERVED EJECTION FRACTION (H): ICD-10-CM

## 2025-07-09 DIAGNOSIS — J30.1 SEASONAL ALLERGIC RHINITIS DUE TO POLLEN: ICD-10-CM

## 2025-07-09 LAB
ANION GAP SERPL CALCULATED.3IONS-SCNC: 14 MMOL/L (ref 7–15)
BUN SERPL-MCNC: 15 MG/DL (ref 8–23)
CALCIUM SERPL-MCNC: 9.6 MG/DL (ref 8.8–10.4)
CHLORIDE SERPL-SCNC: 102 MMOL/L (ref 98–107)
CREAT SERPL-MCNC: 1.1 MG/DL (ref 0.51–0.95)
EGFRCR SERPLBLD CKD-EPI 2021: 49 ML/MIN/1.73M2
GLUCOSE SERPL-MCNC: 192 MG/DL (ref 70–99)
HCO3 SERPL-SCNC: 23 MMOL/L (ref 22–29)
POTASSIUM SERPL-SCNC: 4.3 MMOL/L (ref 3.4–5.3)
SODIUM SERPL-SCNC: 139 MMOL/L (ref 135–145)

## 2025-07-09 PROCEDURE — 36415 COLL VENOUS BLD VENIPUNCTURE: CPT | Performed by: PATHOLOGY

## 2025-07-09 PROCEDURE — 80048 BASIC METABOLIC PNL TOTAL CA: CPT | Performed by: PATHOLOGY

## 2025-07-09 RX ORDER — TRAZODONE HYDROCHLORIDE 50 MG/1
TABLET ORAL
Qty: 90 TABLET | Refills: 0 | Status: SHIPPED | OUTPATIENT
Start: 2025-07-09

## 2025-07-09 RX ORDER — LEVOCETIRIZINE DIHYDROCHLORIDE 5 MG/1
5 TABLET, FILM COATED ORAL EVERY EVENING
Qty: 90 TABLET | Refills: 1 | OUTPATIENT
Start: 2025-07-09

## 2025-07-09 RX ORDER — TIRZEPATIDE 12.5 MG/.5ML
12.5 INJECTION, SOLUTION SUBCUTANEOUS
OUTPATIENT
Start: 2025-07-09

## 2025-07-09 RX ORDER — TIRZEPATIDE 7.5 MG/.5ML
INJECTION, SOLUTION SUBCUTANEOUS
OUTPATIENT
Start: 2025-07-09

## 2025-07-09 RX ORDER — ATORVASTATIN CALCIUM 10 MG/1
5 TABLET, FILM COATED ORAL DAILY
Qty: 45 TABLET | Refills: 1 | Status: SHIPPED | OUTPATIENT
Start: 2025-07-09

## 2025-07-09 NOTE — TELEPHONE ENCOUNTER
Last Written Prescription:   Disp Refills Start End EBENEZER   atorvastatin (LIPITOR) 10 MG tablet 45 tablet 1 2/6/2025 -- No   Sig - Route: Take 0.5 tablets (5 mg) by mouth daily. - Oral     ----------------------  Last Visit Date: 6/25/2025  [facility]      Future Visit Date:   10/14/2025 12:20 PM (40 min)  [facility]    Arrive by: 12:05 PM   RETURN CORE   Rfl Status: Pending Review   CV ([facility])   Neela Jones PA-C     ----------------------      Refill decision:   [x] Medication refilled per  Medication Refill in Ambulatory Care  policy.    LDL Cholesterol Calculated   Date Value Ref Range Status   03/12/2025 33 <100 mg/dL Final   10/20/2020 19 <100 mg/dL Final     Comment:     Desirable:       <100 mg/dl       Request from pharmacy:  Requested Prescriptions   Pending Prescriptions Disp Refills    atorvastatin (LIPITOR) 10 MG tablet [Pharmacy Med Name: ATORVASTATIN 10 MG TABLET] 45 tablet 1     Sig: TAKE 1/2 TABLET BY MOUTH DAILY       Antihyperlipidemic agents Passed - 7/9/2025 12:48 PM        Passed - LDL on file in the past 12 months        Passed - Medication is active on med list and the sig matches. RN to manually verify dose and sig if red X/fail.     If the protocol passes (green check), you do not need to verify med dose and sig.    A prescription matches if they are the same clinical intention.    For Example: once daily and every morning are the same.    The protocol can not identify upper and lower case letters as matching and will fail.     For Example: Take 1 tablet (50 mg) by mouth daily     TAKE 1 TABLET (50 MG) BY MOUTH DAILY    For all fails (red x), verify dose and sig.    If the refill does match what is on file, the RN can still proceed to approve the refill request.       If they do not match, route to the appropriate provider.             Passed - Recent (12 month) or future (90 days) visit with authorizing provider's specialty (provided they have been seen in  the past 15 months)     The patient must have completed an in-person or virtual visit within the past 12 months or has a future visit scheduled within the next 90 days with the authorizing provider s specialty.  Urgent care and e-visits do not qualify as an office visit for this protocol.          Passed - Patient is age 18 years or older        Passed - No active pregnancy on record        Passed - No positive pregnancy test in past 12 mos

## 2025-07-13 DIAGNOSIS — E11.9 TYPE 2 DIABETES MELLITUS WITHOUT COMPLICATION, WITHOUT LONG-TERM CURRENT USE OF INSULIN (H): ICD-10-CM

## 2025-07-14 RX ORDER — PEN NEEDLE, DIABETIC 32GX 5/32"
NEEDLE, DISPOSABLE MISCELLANEOUS
Qty: 400 EACH | Refills: 1 | Status: SHIPPED | OUTPATIENT
Start: 2025-07-14

## 2025-07-18 ENCOUNTER — TELEPHONE (OUTPATIENT)
Dept: FAMILY MEDICINE | Facility: CLINIC | Age: 85
End: 2025-07-18

## 2025-07-18 ENCOUNTER — OFFICE VISIT (OUTPATIENT)
Dept: FAMILY MEDICINE | Facility: CLINIC | Age: 85
End: 2025-07-18
Payer: COMMERCIAL

## 2025-07-18 VITALS
DIASTOLIC BLOOD PRESSURE: 62 MMHG | WEIGHT: 183 LBS | HEIGHT: 65 IN | OXYGEN SATURATION: 95 % | BODY MASS INDEX: 30.49 KG/M2 | SYSTOLIC BLOOD PRESSURE: 118 MMHG | RESPIRATION RATE: 19 BRPM | TEMPERATURE: 97.9 F | HEART RATE: 59 BPM

## 2025-07-18 DIAGNOSIS — N95.2 ATROPHIC VAGINITIS: ICD-10-CM

## 2025-07-18 DIAGNOSIS — Z92.29 HISTORY OF BISPHOSPHONATE THERAPY: ICD-10-CM

## 2025-07-18 DIAGNOSIS — N93.9 VAGINAL BLEEDING: Primary | ICD-10-CM

## 2025-07-18 DIAGNOSIS — M81.0 AGE-RELATED OSTEOPOROSIS WITHOUT CURRENT PATHOLOGICAL FRACTURE: ICD-10-CM

## 2025-07-18 DIAGNOSIS — N39.46 MIXED STRESS AND URGE URINARY INCONTINENCE: ICD-10-CM

## 2025-07-18 LAB
ALBUMIN UR-MCNC: NEGATIVE MG/DL
APPEARANCE UR: CLEAR
BILIRUB UR QL STRIP: NEGATIVE
CLUE CELLS: ABNORMAL
COLOR UR AUTO: ABNORMAL
GLUCOSE UR STRIP-MCNC: 500 MG/DL
HGB UR QL STRIP: ABNORMAL
KETONES UR STRIP-MCNC: NEGATIVE MG/DL
LEUKOCYTE ESTERASE UR QL STRIP: ABNORMAL
NITRATE UR QL: NEGATIVE
PH UR STRIP: 5.5 [PH] (ref 5–7)
RBC #/AREA URNS AUTO: ABNORMAL /HPF
SP GR UR STRIP: 1.01 (ref 1–1.03)
TRICHOMONAS, WET PREP: ABNORMAL
UROBILINOGEN UR STRIP-ACNC: 0.2 E.U./DL
WBC #/AREA URNS AUTO: ABNORMAL /HPF
WBC'S/HIGH POWER FIELD, WET PREP: ABNORMAL
YEAST, WET PREP: ABNORMAL

## 2025-07-18 PROCEDURE — 1126F AMNT PAIN NOTED NONE PRSNT: CPT | Performed by: PHYSICIAN ASSISTANT

## 2025-07-18 PROCEDURE — 3074F SYST BP LT 130 MM HG: CPT | Performed by: PHYSICIAN ASSISTANT

## 2025-07-18 PROCEDURE — 81001 URINALYSIS AUTO W/SCOPE: CPT | Performed by: PHYSICIAN ASSISTANT

## 2025-07-18 PROCEDURE — 3078F DIAST BP <80 MM HG: CPT | Performed by: PHYSICIAN ASSISTANT

## 2025-07-18 PROCEDURE — 99214 OFFICE O/P EST MOD 30 MIN: CPT | Performed by: PHYSICIAN ASSISTANT

## 2025-07-18 PROCEDURE — 96372 THER/PROPH/DIAG INJ SC/IM: CPT | Performed by: FAMILY MEDICINE

## 2025-07-18 PROCEDURE — 87210 SMEAR WET MOUNT SALINE/INK: CPT | Performed by: PHYSICIAN ASSISTANT

## 2025-07-18 RX ORDER — FLUCONAZOLE 150 MG/1
150 TABLET ORAL
Qty: 3 TABLET | Refills: 0 | Status: SHIPPED | OUTPATIENT
Start: 2025-07-18 | End: 2025-07-25

## 2025-07-18 ASSESSMENT — PAIN SCALES - GENERAL: PAINLEVEL_OUTOF10: NO PAIN (0)

## 2025-07-18 ASSESSMENT — PATIENT HEALTH QUESTIONNAIRE - PHQ9
SUM OF ALL RESPONSES TO PHQ QUESTIONS 1-9: 6
SUM OF ALL RESPONSES TO PHQ QUESTIONS 1-9: 6

## 2025-07-18 NOTE — NURSING NOTE
Clinic Administered Medication Documentation      Prolia Documentation    Indication: Prolia  (denosumab) is a prescription medicine used to treat osteoporosis in patients who:   Are at high risk for fracture, meaning patients who have had a fracture related to osteoporosis, or who have multiple risk factors for fracture.  Cannot use another osteoporosis medicine or other osteoporosis medicines did not work well.  The timeline for early/late injections would be 4 weeks early and any time after the 6 month savana. If a patient receives their injection late, then the subsequent injection would be 6 months from the date that they actually received the injection.    When was the last injection?  25  Was the last injection at least 6 months ago? Yes  Has the prior authorization been completed?  Yes  Is there an active order (written within the past 365 days, with administrations remaining, not ) in the chart?  Yes   Calcium   Date Value Ref Range Status   2025 9.6 8.8 - 10.4 mg/dL Final   2021 8.8 8.5 - 10.1 mg/dL Final     Has patient had a Calcium test in the last 12 months? Yes   Is the calcium result 8.8 or above? Yes   GFR Estimate   Date Value Ref Range Status   2025 49 (L) >60 mL/min/1.73m2 Final     Comment:     eGFR calculated using  CKD-EPI equation.   2021 47 (L) >60 mL/min/[1.73_m2] Final     Comment:     Non  GFR Calc  Starting 2018, serum creatinine based estimated GFR (eGFR) will be   calculated using the Chronic Kidney Disease Epidemiology Collaboration   (CKD-EPI) equation.       Has patient had a GFR within the last 12 months? Yes   Is GFR under 30, or patient has a diagnosis of CKD4 or CKD5? No   Patient denies gastric bypass or parathyroid surgery in past 6 months? Yes - patient denies.   Patient denies undergoing any dental procedures involving drilling into the bone, such as implants, extractions, or oral surgery, within the past two months  that have not yet healed?  Yes - patient denies  Patient denies plans for an emergency tooth extraction within the next week? Yes    The following steps were completed to comply with the REMS program for Prolia:  Reviewed information in the Medication Guide, including the serious risks of Prolia  and the symptoms of each risk.  Advised patient to seek prompt medical attention if they have signs or symptoms of any of the serious risks.  Provided each patient a copy of the Medication Guide and Patient Guide.    Prior to injection, verified patient identity using patient's name and date of birth. Medication was administered. Please see MAR and medication order for additional information. Patient instructed to remain in clinic for 15 minutes and report any adverse reaction to staff immediately.    Vial/Syringe: Single dose vial. Was entire vial of medication used? Yes  Was this medication supplied by the patient? No  Patient has no administrations remaining. Pend an order for Prolia and send to the Provider.

## 2025-07-18 NOTE — PROGRESS NOTES
Assessment & Plan     Vaginal bleeding  Atrophic vaginitis  Mixed stress and urge urinary incontinence  Most likely local irritation; will discontinue estradiol vaginal tablet with insertion device; unclear if using compounded estrogen cream so will send new prescription for Premarin instead. Presumptively treat for yeast infection with oral fluconazole x 3 doses (make sure to drink plenty of water to protect kidneys). Option for pelvic floor Physical Therapy discussed with patient and referral in place; follow up with MTM as scheduled to discuss need for change in Jardiance, possible further/different incontinence control and further increase in estrogen cream over the next few weeks. Return to clinic with any worsening or changes in symptoms or go to ER Urgent care in off hours.  - UA Macroscopic with reflex to Microscopic and Culture; Future  - Wet preparation; Future  - Wet preparation  - UA Macroscopic with reflex to Microscopic and Culture  - UA Microscopic with Reflex to Culture        (PREMARIN) 0.625 MG/GM vaginal cream, UA         Microscopic with Reflex to Culture, fluconazole        (DIFLUCAN) 150 MG tablet        Plan: Physical Therapy  Referral           Follow-up  Return in about 4 weeks (around 8/15/2025) for with PCP, or sooner with worsening symptoms.    Daniel Hernández is a 85 year old, presenting for the following health issues:  Vaginal Problem        7/18/2025     2:01 PM   Additional Questions   Roomed by phoebe batista   Accompanied by n/a         7/18/2025     2:01 PM   Patient Reported Additional Medications   Patient reports taking the following new medications n/a     Vaginal Problem     History of Present Illness       Reason for visit:  Shot Prolia injection She is missing 7 dose(s) of medications per week.        Working with MTM and recently started Jardiance for CH/CKD protection  She notes a spot of blood in vaginal/rectal area on a liner (like a area popped, bright red  "type spot of blood), but unsure where blood is coming from exactly, occurring x 2 weeks off/on. Uncomfortable especially with wiping at times  Patient has had a full hysterectomy 10 years ago  Patient was also given vaginal estrogen? pill by urology - has had to use a plastic insertion device to insert tablet into vaginal area, tried once and then stopped it (5/22/25 maybe)  Patient has also been using topical ketoconazole cream from PCP, Dr. Bran for previous itching and irritation in the area off/on for some time. Patient was given estrogen vaginal cream (compounded?) but was not regular with use and stopped most recently with flare in current symptoms.  No burning with urination, blood in urine/toilet bowel, fever, chills, night sweats, lightheadedness, weakness, back pain.        Review of Systems  Constitutional, HEENT, cardiovascular, pulmonary, gi and gu systems are negative, except as otherwise noted.      Objective    /62 (BP Location: Left arm, Patient Position: Sitting, Cuff Size: Adult Regular)   Pulse 59   Temp 97.9  F (36.6  C) (Temporal)   Resp 19   Ht 1.651 m (5' 5\")   Wt 83 kg (183 lb)   LMP  (LMP Unknown)   SpO2 95%   BMI 30.45 kg/m    Body mass index is 30.45 kg/m .  Physical Exam   GENERAL: alert and no distress  RESP: lungs clear to auscultation - no rales, rhonchi or wheezes  CV: regular rate and rhythm, normal S1 S2, no S3 or S4, no murmur, click or rub, no peripheral edema  ABDOMEN: soft, nontender, no hepatosplenomegaly, no masses and bowel sounds normal   (female): normal female external genitalia, but increased erythema and swelling in general vaginal area, mucosa and urethral area, slight white discharge around urethral area as well; wet prep swab attempted to be collected externally and slightly internally but difficult given inflammation and patient discomfort  MS: no gross musculoskeletal defects noted, no edema  PSYCH: mentation appears normal, affect " normal/bright    Recent Results (from the past 24 hours)   Wet preparation    Specimen: Vagina; Swab   Result Value Ref Range    Trichomonas Absent Absent    Yeast Absent Absent    Clue Cells Absent Absent    WBCs/high power field 1+ (A) None   UA Macroscopic with reflex to Microscopic and Culture    Specimen: Urine, Midstream   Result Value Ref Range    Color Urine Light Yellow Colorless, Straw, Light Yellow, Yellow    Appearance Urine Clear Clear    Glucose Urine 500 (A) Negative mg/dL    Bilirubin Urine Negative Negative    Ketones Urine Negative Negative mg/dL    Specific Gravity Urine 1.010 1.003 - 1.035    Blood Urine Small (A) Negative    pH Urine 5.5 5.0 - 7.0    Protein Albumin Urine Negative Negative mg/dL    Urobilinogen Urine 0.2 0.2, 1.0 E.U./dL    Nitrite Urine Negative Negative    Leukocyte Esterase Urine Trace (A) Negative   UA Microscopic with Reflex to Culture   Result Value Ref Range    RBC Urine 5-10 (A) 0-2 /HPF /HPF    WBC Urine 5-10 (A) 0-5 /HPF /HPF    Narrative    Urine Culture not indicated           Signed Electronically by: Shiva Hidalgo PA-C

## 2025-07-18 NOTE — TELEPHONE ENCOUNTER
Patient has completed current Prolia order. Will need new order prior to next administration 1/19/26.     Pended reorder to start 1/19/26 for 2 doses.     Alma Delia Gutierrez RN

## 2025-07-21 ENCOUNTER — TELEPHONE (OUTPATIENT)
Dept: RHEUMATOLOGY | Facility: CLINIC | Age: 85
End: 2025-07-21
Payer: COMMERCIAL

## 2025-07-21 DIAGNOSIS — M10.9 GOUT, UNSPECIFIED CAUSE, UNSPECIFIED CHRONICITY, UNSPECIFIED SITE: ICD-10-CM

## 2025-07-21 NOTE — TELEPHONE ENCOUNTER
PA Needed    Medication: kineret  QTY/DS: 18.76 per 28  NEW INS: no  Insurance Company: [facility] part d  Pharmacy Filling the Rx:  [facility] :    Date of last fill:

## 2025-07-25 NOTE — TELEPHONE ENCOUNTER
Wesley arroyo needs clarification on dosage/ directions for pt's medication (Kineret) please follow up

## 2025-07-28 NOTE — TELEPHONE ENCOUNTER
"Called to follow up with patient.     Per last visit on 6/25, \"Try anakinra every other day x next month\"    Betty states she has not been feeling very well. She has increased her anakinra to daily for the last week due to significant joint pain in her hands. She reports pain and stiffness is worse at night and in am, and improves by about noon each day. She has intermittent swelling/redness and warmth in each hand. She states her wrists feel ok right now. She is currently taking prednisone 5mg daily and has not done a taper recently.       Plan:  Routing to provider for guidance.       Tamara VIGIL RN  [facility]      "

## 2025-07-29 ENCOUNTER — VIRTUAL VISIT (OUTPATIENT)
Dept: PHARMACY | Facility: CLINIC | Age: 85
End: 2025-07-29
Payer: COMMERCIAL

## 2025-07-29 DIAGNOSIS — Z79.4 TYPE 2 DIABETES MELLITUS WITH HYPERGLYCEMIA, WITH LONG-TERM CURRENT USE OF INSULIN (H): Primary | ICD-10-CM

## 2025-07-29 DIAGNOSIS — N39.41 URGE INCONTINENCE OF URINE: ICD-10-CM

## 2025-07-29 DIAGNOSIS — E11.65 TYPE 2 DIABETES MELLITUS WITH HYPERGLYCEMIA, WITH LONG-TERM CURRENT USE OF INSULIN (H): Primary | ICD-10-CM

## 2025-07-29 DIAGNOSIS — M35.02 SJOGREN'S SYNDROME WITH LUNG INVOLVEMENT (H): ICD-10-CM

## 2025-07-29 DIAGNOSIS — M10.9 GOUT, UNSPECIFIED CAUSE, UNSPECIFIED CHRONICITY, UNSPECIFIED SITE: ICD-10-CM

## 2025-07-29 PROCEDURE — 99606 MTMS BY PHARM EST 15 MIN: CPT | Mod: 95 | Performed by: PHARMACIST

## 2025-07-29 PROCEDURE — 99607 MTMS BY PHARM ADDL 15 MIN: CPT | Mod: 95 | Performed by: PHARMACIST

## 2025-07-29 NOTE — TELEPHONE ENCOUNTER
Reviewed instructions with patient to increase prednisone to 7.5mg daily (did not get the message yesterday) at MTM appointment . Confirmed she is still taking anakinra daily. She is still having significant pain in her hands. Will route note to Dr. Lopez's team so they can check in with Betty next week for next steps.     Sabrina Meek, MarcosD, JAMES, BCACP  [facility] Pharmacist, [facility]

## 2025-07-29 NOTE — TELEPHONE ENCOUNTER
Prior Authorization Approval    Medication: KINERET 100 MG/0.67ML SC SOSY  Authorization Effective Date: 7/29/2025  Authorization Expiration Date: 7/22/2026  Approved Dose/Quantity: 18.76  Reference #: [id number]   Insurance Company: Medicare [employer] - Phone [phone] Fax [phone]  Expected CoPay: $    CoPay Card Available: No    Financial Assistance Needed: no  Which Pharmacy is filling the prescription: [facility] - [place], [place] - [address]  Pharmacy Notified: yes  Patient Notified: yes

## 2025-07-29 NOTE — TELEPHONE ENCOUNTER
Entering Prolia orders per CW.     Sabrina Meek, MarcosD, JAMES, BCACP  MTM Pharmacist, [facility]

## 2025-07-31 RX ORDER — PREDNISONE 5 MG/1
7.5 TABLET ORAL DAILY
Qty: 135 TABLET | Refills: 3 | Status: SHIPPED | OUTPATIENT
Start: 2025-07-31

## 2025-07-31 NOTE — TELEPHONE ENCOUNTER
Called to check in with patient. She was recommended to increase prednisone to 7.5mg daily earlier this week. She reports she is doing better today, was able to get the best sleep last night in a long time. She used salonpas patch on her hand overnight. States other topicals in the past were not effective. She will continue to monitor and update us if pain worsens again.       -routing to Dr. Lopez for update.   -pended prednisone to reflect her current dose (if plan is to continue).       Tamara VIGIL RN  [facility]

## 2025-08-12 DIAGNOSIS — Z79.4 TYPE 2 DIABETES MELLITUS WITH HYPERGLYCEMIA, WITH LONG-TERM CURRENT USE OF INSULIN (H): ICD-10-CM

## 2025-08-12 DIAGNOSIS — E11.65 TYPE 2 DIABETES MELLITUS WITH HYPERGLYCEMIA, WITH LONG-TERM CURRENT USE OF INSULIN (H): ICD-10-CM

## 2025-08-12 RX ORDER — TIRZEPATIDE 12.5 MG/.5ML
12.5 INJECTION, SOLUTION SUBCUTANEOUS
OUTPATIENT
Start: 2025-08-12

## 2025-08-12 RX ORDER — TIRZEPATIDE 7.5 MG/.5ML
INJECTION, SOLUTION SUBCUTANEOUS
OUTPATIENT
Start: 2025-08-12

## 2025-08-12 RX ORDER — EMPAGLIFLOZIN 10 MG/1
10 TABLET, FILM COATED ORAL DAILY
Qty: 90 TABLET | Refills: 1 | OUTPATIENT
Start: 2025-08-12

## 2025-08-14 ENCOUNTER — OFFICE VISIT (OUTPATIENT)
Dept: PULMONOLOGY | Facility: CLINIC | Age: 85
End: 2025-08-14
Attending: INTERNAL MEDICINE
Payer: COMMERCIAL

## 2025-08-14 VITALS
HEART RATE: 67 BPM | DIASTOLIC BLOOD PRESSURE: 80 MMHG | SYSTOLIC BLOOD PRESSURE: 132 MMHG | OXYGEN SATURATION: 96 % | WEIGHT: 181 LBS | HEIGHT: 65 IN | BODY MASS INDEX: 30.16 KG/M2

## 2025-08-14 DIAGNOSIS — J44.89 FOLLICULAR BRONCHIOLITIS (H): Primary | ICD-10-CM

## 2025-08-14 DIAGNOSIS — J44.89 FOLLICULAR BRONCHIOLITIS (H): ICD-10-CM

## 2025-08-14 LAB
DLCOUNC-%PRED-PRE: 78 %
DLCOUNC-PRE: 14.53 ML/MIN/MMHG
DLCOUNC-PRED: 18.45 ML/MIN/MMHG
ERV-%PRED-PRE: 51 %
ERV-PRE: 0.34 L
ERV-PRED: 0.67 L
EXPTIME-PRE: 7.99 SEC
FEF2575-%PRED-PRE: 70 %
FEF2575-PRE: 0.96 L/SEC
FEF2575-PRED: 1.37 L/SEC
FEFMAX-%PRED-PRE: 135 %
FEFMAX-PRE: 6 L/SEC
FEFMAX-PRED: 4.42 L/SEC
FEV1-%PRED-PRE: 74 %
FEV1-PRE: 1.44 L
FEV1FEV6-PRE: 75 %
FEV1FEV6-PRED: 77 %
FEV1FVC-PRE: 73 %
FEV1FVC-PRED: 77 %
FEV1SVC-PRE: 74 L
FEV1SVC-PRED: 72 L
FIFMAX-PRE: 2.57 L/SEC
FRCPLETH-%PRED-PRE: 72 %
FRCPLETH-PRE: 2.16 L
FRCPLETH-PRED: 2.97 L
FVC-%PRED-PRE: 76 %
FVC-PRE: 1.96 L
FVC-PRED: 2.57 L
GAW-PRED: 1.03 L/S/CMH2O
IC-%PRED-PRE: 81 %
IC-PRE: 1.61 L
IC-PRED: 1.97 L
Lab: 95 %
RVPLETH-%PRED-PRE: 80 %
RVPLETH-PRE: 1.82 L
RVPLETH-PRED: 2.26 L
SGAW-PRED: 0.2 1/CMH2O*S
SRAW-PRED: < 4.76 CMH2O*S
TLCPLETH-%PRED-PRE: 74 %
TLCPLETH-PRE: 3.77 L
TLCPLETH-PRED: 5.03 L
VA-%PRED-PRE: 74 %
VA-PRE: 3.38 L
VC-%PRED-PRE: 73 %
VC-PRE: 1.95 L
VC-PRED: 2.67 L

## 2025-08-14 PROCEDURE — G0463 HOSPITAL OUTPT CLINIC VISIT: HCPCS | Performed by: INTERNAL MEDICINE

## 2025-08-14 ASSESSMENT — PAIN SCALES - GENERAL: PAINLEVEL_OUTOF10: NO PAIN (0)

## 2025-08-16 ENCOUNTER — HEALTH MAINTENANCE LETTER (OUTPATIENT)
Age: 85
End: 2025-08-16

## 2025-08-19 DIAGNOSIS — G47.00 INSOMNIA, UNSPECIFIED TYPE: ICD-10-CM

## 2025-08-19 RX ORDER — TRAZODONE HYDROCHLORIDE 50 MG/1
TABLET ORAL
Qty: 180 TABLET | Refills: 2 | Status: SHIPPED | OUTPATIENT
Start: 2025-08-19

## 2025-09-02 ENCOUNTER — VIRTUAL VISIT (OUTPATIENT)
Dept: PHARMACY | Facility: CLINIC | Age: 85
End: 2025-09-02
Payer: COMMERCIAL

## 2025-09-02 DIAGNOSIS — Z79.4 TYPE 2 DIABETES MELLITUS WITH HYPERGLYCEMIA, WITH LONG-TERM CURRENT USE OF INSULIN (H): Primary | ICD-10-CM

## 2025-09-02 DIAGNOSIS — G47.00 INSOMNIA, UNSPECIFIED TYPE: ICD-10-CM

## 2025-09-02 DIAGNOSIS — E11.65 TYPE 2 DIABETES MELLITUS WITH HYPERGLYCEMIA, WITH LONG-TERM CURRENT USE OF INSULIN (H): Primary | ICD-10-CM

## 2025-09-02 DIAGNOSIS — M10.9 GOUT, UNSPECIFIED CAUSE, UNSPECIFIED CHRONICITY, UNSPECIFIED SITE: ICD-10-CM

## 2025-09-02 PROCEDURE — 99606 MTMS BY PHARM EST 15 MIN: CPT | Mod: 95 | Performed by: PHARMACIST

## 2025-09-04 ENCOUNTER — OFFICE VISIT (OUTPATIENT)
Dept: OPHTHALMOLOGY | Facility: CLINIC | Age: 85
End: 2025-09-04
Attending: OPHTHALMOLOGY
Payer: COMMERCIAL

## 2025-09-04 DIAGNOSIS — J84.9 ILD (INTERSTITIAL LUNG DISEASE) (H): ICD-10-CM

## 2025-09-04 DIAGNOSIS — J30.1 SEASONAL ALLERGIC RHINITIS DUE TO POLLEN: ICD-10-CM

## 2025-09-04 DIAGNOSIS — Z79.4 TYPE 2 DIABETES MELLITUS WITH HYPERGLYCEMIA, WITH LONG-TERM CURRENT USE OF INSULIN (H): ICD-10-CM

## 2025-09-04 DIAGNOSIS — Z79.899 HIGH RISK MEDICATION USE: Primary | ICD-10-CM

## 2025-09-04 DIAGNOSIS — Z96.1 PSEUDOPHAKIA OF BOTH EYES: ICD-10-CM

## 2025-09-04 DIAGNOSIS — H04.123 DRY EYE SYNDROME OF BOTH EYES: ICD-10-CM

## 2025-09-04 DIAGNOSIS — E11.65 TYPE 2 DIABETES MELLITUS WITH HYPERGLYCEMIA, WITH LONG-TERM CURRENT USE OF INSULIN (H): ICD-10-CM

## 2025-09-04 DIAGNOSIS — M35.02 SJOGREN'S SYNDROME WITH LUNG INVOLVEMENT (H): ICD-10-CM

## 2025-09-04 PROCEDURE — 92082 INTERMEDIATE VISUAL FIELD XM: CPT | Performed by: OPHTHALMOLOGY

## 2025-09-04 PROCEDURE — 92015 DETERMINE REFRACTIVE STATE: CPT

## 2025-09-04 PROCEDURE — 92134 CPTRZ OPH DX IMG PST SGM RTA: CPT | Performed by: OPHTHALMOLOGY

## 2025-09-04 RX ORDER — ALBUTEROL SULFATE 90 UG/1
2 INHALANT RESPIRATORY (INHALATION) EVERY 6 HOURS PRN
Qty: 18 G | Refills: 4 | Status: SHIPPED | OUTPATIENT
Start: 2025-09-04

## 2025-09-04 RX ORDER — FLUTICASONE PROPIONATE 50 MCG
1-2 SPRAY, SUSPENSION (ML) NASAL DAILY PRN
Qty: 16 ML | Refills: 11 | Status: SHIPPED | OUTPATIENT
Start: 2025-09-04

## 2025-09-04 ASSESSMENT — CUP TO DISC RATIO
OD_RATIO: 0.3
OS_RATIO: 0.3

## 2025-09-04 ASSESSMENT — CONF VISUAL FIELD
OD_SUPERIOR_TEMPORAL_RESTRICTION: 0
OD_INFERIOR_TEMPORAL_RESTRICTION: 0
OD_SUPERIOR_NASAL_RESTRICTION: 0
OD_INFERIOR_NASAL_RESTRICTION: 0
OS_SUPERIOR_TEMPORAL_RESTRICTION: 0
OS_INFERIOR_NASAL_RESTRICTION: 0
OS_NORMAL: 1
METHOD: COUNTING FINGERS
OS_SUPERIOR_NASAL_RESTRICTION: 0
OD_NORMAL: 1
OS_INFERIOR_TEMPORAL_RESTRICTION: 0

## 2025-09-04 ASSESSMENT — REFRACTION_MANIFEST
OD_SPHERE: -0.75
OS_CYLINDER: +0.50
OD_AXIS: 175
OS_AXIS: 160
OD_ADD: +2.50
OS_SPHERE: PLANO
OD_CYLINDER: +1.50
OS_ADD: +2.50

## 2025-09-04 ASSESSMENT — VISUAL ACUITY
METHOD: SNELLEN - LINEAR
METHOD_MR_RETINOSCOPY: 1
OS_CC: 20/25
CORRECTION_TYPE: GLASSES
OD_CC: 20/25
OD_CC+: -2

## 2025-09-04 ASSESSMENT — EXTERNAL EXAM - LEFT EYE: OS_EXAM: BROW PTOSIS

## 2025-09-04 ASSESSMENT — REFRACTION_WEARINGRX
OD_SPHERE: PLANO
OS_CYLINDER: +0.25
OD_ADD: +2.50
OS_AXIS: 155
OS_SPHERE: PLANO
OD_CYLINDER: +0.75
OS_ADD: +2.50
OD_AXIS: 180

## 2025-09-04 ASSESSMENT — SLIT LAMP EXAM - LIDS
COMMENTS: DERMATOCHALASIS
COMMENTS: DERMATOCHALASIS

## 2025-09-04 ASSESSMENT — TONOMETRY
IOP_METHOD: ICARE
OS_IOP_MMHG: 11
OD_IOP_MMHG: 12

## 2025-09-04 ASSESSMENT — EXTERNAL EXAM - RIGHT EYE: OD_EXAM: BROW PTOSIS

## (undated) DEVICE — GLOVE BIOGEL PI MICRO INDICATOR UNDERGLOVE SZ 8.0 48980

## (undated) DEVICE — LINEN MAYO STAND COVER OVERSIZE PACK 5458

## (undated) DEVICE — BLADE SAW RECIP STRK 70X6X0.025MM 0277-096-250S5

## (undated) DEVICE — MANIFOLD KIT ANGIO AUTOMATED 014613

## (undated) DEVICE — SU ETHIBOND 5 V-37 4X30" MB66G

## (undated) DEVICE — HOOD SURG T7PLUS PEEL AWAY FACE SHIELD STRL LF 0416-801-100

## (undated) DEVICE — GOWN IMPERVIOUS SPECIALTY XLG/XLONG 32474

## (undated) DEVICE — DRSG STERI STRIP 1/2X4" R1547

## (undated) DEVICE — SOL NACL 0.9% IRRIG 1000ML BOTTLE 2F7124

## (undated) DEVICE — POSITIONER ABDUCTION PILLOW FOAM MED FP-ABDUCTM

## (undated) DEVICE — DRAPE IOBAN INCISE 36X23" 6651EZ

## (undated) DEVICE — INTRO SHEATH 9FRX10CM PINNACLE RSS902

## (undated) DEVICE — BONE CLEANING TIP INTERPULSE FEMORAL CANAL 0210-008-000

## (undated) DEVICE — STRAP STIRRUP W/SLIP 30187-030

## (undated) DEVICE — BONE CLEANING TIP INTERPULSE  0210-010-000

## (undated) DEVICE — DRSG TEGADERM 4X10" 1627

## (undated) DEVICE — SPONGE LAP 18X18" X8435

## (undated) DEVICE — WIRE GUIDE 0.035"X145CM AMPLATZ XSTIFF J THSCF-35-145-3-AES

## (undated) DEVICE — PREP CHLORAPREP 26ML TINTED HI-LITE ORANGE 930815

## (undated) DEVICE — SPONGE PACK VAGINAL 2"X9

## (undated) DEVICE — SU VICRYL 2-0 CT-1 27" UND J259H

## (undated) DEVICE — TUBING PRESSURE 30"

## (undated) DEVICE — BONE CEMENT MIXEVAC HI VAC W/CARTRIDGE 0306-563-000

## (undated) DEVICE — PACK TOTAL HIP W/POUCH RIVERSIDE LATEX FREE

## (undated) DEVICE — CATH ANGIO IMPULSE 6FR 110CML  PIGTAIL

## (undated) DEVICE — CEMENT PRESSURIZER FEMORAL CANAL MED 0206-546-000

## (undated) DEVICE — GLOVE BIOGEL PI SZ 7.5 40875

## (undated) DEVICE — INTRO SHEATH 6FRX25CM PINNACLE RSS606

## (undated) DEVICE — SHEATH INTRODUCER DRYSEAL FLEX W/HYDRO CT 16FRX33CM DSF1633

## (undated) DEVICE — LINEN ORTHO PACK 5446

## (undated) DEVICE — DRSG TEGADERM ALGINATE AG 4X5" 90303

## (undated) DEVICE — DRSG KERLIX 4 1/2"X4YDS ROLL 6730

## (undated) DEVICE — LINEN TOWEL PACK X5 5464

## (undated) DEVICE — SU DERMABOND ADVANCED .7ML DNX12

## (undated) DEVICE — DRAPE STERI TOWEL LG 1010

## (undated) DEVICE — SU MONOCRYL 3-0 PS-1 27" Y936H

## (undated) DEVICE — STRAP KNEE/BODY 31143004

## (undated) DEVICE — ESU GROUND PAD ADULT W/CORD E7507

## (undated) DEVICE — BLADE KNIFE SURG 20 371120

## (undated) DEVICE — SUCTION IRR SYSTEM W/O TIP INTERPULSE HANDPIECE 0210-100-000

## (undated) DEVICE — SOL NACL 0.9% INJ 1000ML BAG 2B1324X

## (undated) DEVICE — PACK HEART LEFT CUSTOM

## (undated) DEVICE — ESU PENCIL W/SMOKE EVAC NEPTUNE STRYKER 0703-046-000

## (undated) DEVICE — SUCTION MANIFOLD NEPTUNE 2 SYS 4 PORT 0702-020-000

## (undated) DEVICE — DILATOR VASCULAR 14FRX20CM G00996

## (undated) DEVICE — CLOSURE DEVICE 6FR VASC PROGLIDE MEDICATED SUTURE 12673-03

## (undated) DEVICE — DEVICE RETRIEVER HEWSON 71111579

## (undated) DEVICE — KIT HAND CONTROL ACIST 016795

## (undated) DEVICE — SHEATH WITH DILATOR ACCESS SYSTEM DOUBLE CURVE

## (undated) DEVICE — SU VICRYL 0 CT 36" J358H

## (undated) DEVICE — SOL WATER IRRIG 1000ML BOTTLE 2F7114

## (undated) DEVICE — DRILL BIT FLEXIBLE REFLECTION 35MM 71362935

## (undated) DEVICE — CATH TRANSSEPTAL VERSACROSS 45D 63X230CM VXSK0032

## (undated) RX ORDER — BETAMETHASONE SODIUM PHOSPHATE AND BETAMETHASONE ACETATE 3; 3 MG/ML; MG/ML
INJECTION, SUSPENSION INTRA-ARTICULAR; INTRALESIONAL; INTRAMUSCULAR; SOFT TISSUE
Status: DISPENSED
Start: 2020-08-03

## (undated) RX ORDER — LIDOCAINE HYDROCHLORIDE 20 MG/ML
INJECTION, SOLUTION EPIDURAL; INFILTRATION; INTRACAUDAL; PERINEURAL
Status: DISPENSED
Start: 2022-05-31

## (undated) RX ORDER — EPINEPHRINE 1 MG/ML
INJECTION, SOLUTION INTRAMUSCULAR; SUBCUTANEOUS
Status: DISPENSED
Start: 2023-05-31

## (undated) RX ORDER — ACETAMINOPHEN 325 MG/1
TABLET ORAL
Status: DISPENSED
Start: 2023-05-31

## (undated) RX ORDER — LIDOCAINE HYDROCHLORIDE 10 MG/ML
INJECTION, SOLUTION EPIDURAL; INFILTRATION; INTRACAUDAL; PERINEURAL
Status: DISPENSED
Start: 2018-02-07

## (undated) RX ORDER — FENTANYL CITRATE 50 UG/ML
INJECTION, SOLUTION INTRAMUSCULAR; INTRAVENOUS
Status: DISPENSED
Start: 2022-09-26

## (undated) RX ORDER — PROPOFOL 10 MG/ML
INJECTION, EMULSION INTRAVENOUS
Status: DISPENSED
Start: 2022-05-31

## (undated) RX ORDER — FENTANYL CITRATE 50 UG/ML
INJECTION, SOLUTION INTRAMUSCULAR; INTRAVENOUS
Status: DISPENSED
Start: 2023-05-31

## (undated) RX ORDER — CLINDAMYCIN PHOSPHATE 900 MG/50ML
INJECTION, SOLUTION INTRAVENOUS
Status: DISPENSED
Start: 2022-09-26

## (undated) RX ORDER — FENTANYL CITRATE-0.9 % NACL/PF 10 MCG/ML
PLASTIC BAG, INJECTION (ML) INTRAVENOUS
Status: DISPENSED
Start: 2022-05-31

## (undated) RX ORDER — DEXAMETHASONE SODIUM PHOSPHATE 10 MG/ML
INJECTION, SOLUTION INTRAMUSCULAR; INTRAVENOUS
Status: DISPENSED
Start: 2020-02-28

## (undated) RX ORDER — IPRATROPIUM BROMIDE AND ALBUTEROL SULFATE 2.5; .5 MG/3ML; MG/3ML
SOLUTION RESPIRATORY (INHALATION)
Status: DISPENSED
Start: 2022-05-03

## (undated) RX ORDER — OXYCODONE HYDROCHLORIDE 5 MG/1
TABLET ORAL
Status: DISPENSED
Start: 2022-09-26

## (undated) RX ORDER — ONDANSETRON 2 MG/ML
INJECTION INTRAMUSCULAR; INTRAVENOUS
Status: DISPENSED
Start: 2022-05-03

## (undated) RX ORDER — METHOCARBAMOL 500 MG/1
TABLET, FILM COATED ORAL
Status: DISPENSED
Start: 2022-09-26

## (undated) RX ORDER — PROPOFOL 10 MG/ML
INJECTION, EMULSION INTRAVENOUS
Status: DISPENSED
Start: 2023-05-31

## (undated) RX ORDER — CELECOXIB 200 MG/1
CAPSULE ORAL
Status: DISPENSED
Start: 2023-05-31

## (undated) RX ORDER — FENTANYL CITRATE 50 UG/ML
INJECTION, SOLUTION INTRAMUSCULAR; INTRAVENOUS
Status: DISPENSED
Start: 2022-05-31

## (undated) RX ORDER — DEXAMETHASONE SODIUM PHOSPHATE 10 MG/ML
INJECTION, SOLUTION INTRAMUSCULAR; INTRAVENOUS
Status: DISPENSED
Start: 2020-08-03

## (undated) RX ORDER — EPHEDRINE SULFATE 50 MG/ML
INJECTION, SOLUTION INTRAMUSCULAR; INTRAVENOUS; SUBCUTANEOUS
Status: DISPENSED
Start: 2023-05-31

## (undated) RX ORDER — ASPIRIN 325 MG
TABLET ORAL
Status: DISPENSED
Start: 2022-09-26

## (undated) RX ORDER — ONDANSETRON 2 MG/ML
INJECTION INTRAMUSCULAR; INTRAVENOUS
Status: DISPENSED
Start: 2022-05-31

## (undated) RX ORDER — PROTAMINE SULFATE 10 MG/ML
INJECTION, SOLUTION INTRAVENOUS
Status: DISPENSED
Start: 2022-09-26

## (undated) RX ORDER — OXYCODONE HYDROCHLORIDE 5 MG/1
TABLET ORAL
Status: DISPENSED
Start: 2023-05-31

## (undated) RX ORDER — LIDOCAINE HYDROCHLORIDE 10 MG/ML
INJECTION, SOLUTION EPIDURAL; INFILTRATION; INTRACAUDAL; PERINEURAL
Status: DISPENSED
Start: 2020-02-28

## (undated) RX ORDER — EPHEDRINE SULFATE 50 MG/ML
INJECTION, SOLUTION INTRAMUSCULAR; INTRAVENOUS; SUBCUTANEOUS
Status: DISPENSED
Start: 2022-05-31

## (undated) RX ORDER — LIDOCAINE HYDROCHLORIDE 10 MG/ML
INJECTION, SOLUTION EPIDURAL; INFILTRATION; INTRACAUDAL; PERINEURAL
Status: DISPENSED
Start: 2020-08-03

## (undated) RX ORDER — POTASSIUM CHLORIDE 750 MG/1
TABLET, EXTENDED RELEASE ORAL
Status: DISPENSED
Start: 2022-09-26

## (undated) RX ORDER — TRANEXAMIC ACID 650 MG/1
TABLET ORAL
Status: DISPENSED
Start: 2023-05-31